# Patient Record
Sex: FEMALE | Race: BLACK OR AFRICAN AMERICAN | NOT HISPANIC OR LATINO | Employment: OTHER | URBAN - METROPOLITAN AREA
[De-identification: names, ages, dates, MRNs, and addresses within clinical notes are randomized per-mention and may not be internally consistent; named-entity substitution may affect disease eponyms.]

---

## 2018-03-03 ENCOUNTER — HOSPITAL ENCOUNTER (EMERGENCY)
Facility: HOSPITAL | Age: 53
Discharge: HOME | End: 2018-03-03
Attending: EMERGENCY MEDICINE
Payer: MEDICAID

## 2018-03-03 VITALS
BODY MASS INDEX: 30.53 KG/M2 | HEIGHT: 66 IN | WEIGHT: 190 LBS | DIASTOLIC BLOOD PRESSURE: 83 MMHG | OXYGEN SATURATION: 97 % | SYSTOLIC BLOOD PRESSURE: 166 MMHG | RESPIRATION RATE: 14 BRPM | TEMPERATURE: 98 F | HEART RATE: 104 BPM

## 2018-03-03 DIAGNOSIS — E11.9 DM2 (DIABETES MELLITUS, TYPE 2): ICD-10-CM

## 2018-03-03 DIAGNOSIS — K52.9 GASTROENTERITIS, ACUTE: Primary | ICD-10-CM

## 2018-03-03 DIAGNOSIS — I25.2 OLD MI (MYOCARDIAL INFARCTION): ICD-10-CM

## 2018-03-03 DIAGNOSIS — R07.9 CHEST PAIN: ICD-10-CM

## 2018-03-03 DIAGNOSIS — I10 HTN (HYPERTENSION): ICD-10-CM

## 2018-03-03 DIAGNOSIS — R73.9 HYPERGLYCEMIA: ICD-10-CM

## 2018-03-03 LAB
ALBUMIN SERPL BCP-MCNC: 2.7 G/DL
ALP SERPL-CCNC: 110 U/L
ALT SERPL W/O P-5'-P-CCNC: 7 U/L
ANION GAP SERPL CALC-SCNC: 13 MMOL/L
AST SERPL-CCNC: 7 U/L
B-OH-BUTYR BLD STRIP-SCNC: 0.6 MMOL/L
BASOPHILS # BLD AUTO: 0.04 K/UL
BASOPHILS NFR BLD: 0.6 %
BILIRUB SERPL-MCNC: 0.7 MG/DL
BNP SERPL-MCNC: <10 PG/ML
BUN SERPL-MCNC: 48 MG/DL
CALCIUM SERPL-MCNC: 8.8 MG/DL
CHLORIDE SERPL-SCNC: 93 MMOL/L
CO2 SERPL-SCNC: 19 MMOL/L
CREAT SERPL-MCNC: 1.8 MG/DL
DIFFERENTIAL METHOD: ABNORMAL
EOSINOPHIL # BLD AUTO: 0 K/UL
EOSINOPHIL NFR BLD: 0.1 %
ERYTHROCYTE [DISTWIDTH] IN BLOOD BY AUTOMATED COUNT: 12.2 %
EST. GFR  (AFRICAN AMERICAN): 37 ML/MIN/1.73 M^2
EST. GFR  (NON AFRICAN AMERICAN): 32.1 ML/MIN/1.73 M^2
GLUCOSE SERPL-MCNC: 660 MG/DL
HCT VFR BLD AUTO: 37.2 %
HGB BLD-MCNC: 13 G/DL
IMM GRANULOCYTES # BLD AUTO: 0.02 K/UL
IMM GRANULOCYTES NFR BLD AUTO: 0.3 %
LIPASE SERPL-CCNC: 31 U/L
LYMPHOCYTES # BLD AUTO: 1.5 K/UL
LYMPHOCYTES NFR BLD: 20.5 %
MAGNESIUM SERPL-MCNC: 2 MG/DL
MCH RBC QN AUTO: 31.2 PG
MCHC RBC AUTO-ENTMCNC: 34.9 G/DL
MCV RBC AUTO: 89 FL
MONOCYTES # BLD AUTO: 0.7 K/UL
MONOCYTES NFR BLD: 9 %
NEUTROPHILS # BLD AUTO: 5 K/UL
NEUTROPHILS NFR BLD: 69.5 %
NRBC BLD-RTO: 0 /100 WBC
PLATELET # BLD AUTO: 351 K/UL
PMV BLD AUTO: 11.3 FL
POCT GLUCOSE: 337 MG/DL (ref 70–110)
POCT GLUCOSE: 399 MG/DL (ref 70–110)
POTASSIUM SERPL-SCNC: 3.6 MMOL/L
PROT SERPL-MCNC: 7.5 G/DL
RBC # BLD AUTO: 4.17 M/UL
SODIUM SERPL-SCNC: 125 MMOL/L
TROPONIN I SERPL DL<=0.01 NG/ML-MCNC: 0.01 NG/ML
TROPONIN I SERPL DL<=0.01 NG/ML-MCNC: <0.006 NG/ML
WBC # BLD AUTO: 7.21 K/UL

## 2018-03-03 PROCEDURE — 93005 ELECTROCARDIOGRAM TRACING: CPT

## 2018-03-03 PROCEDURE — 80053 COMPREHEN METABOLIC PANEL: CPT

## 2018-03-03 PROCEDURE — 83690 ASSAY OF LIPASE: CPT

## 2018-03-03 PROCEDURE — 93010 ELECTROCARDIOGRAM REPORT: CPT | Mod: ,,, | Performed by: INTERNAL MEDICINE

## 2018-03-03 PROCEDURE — 85025 COMPLETE CBC W/AUTO DIFF WBC: CPT

## 2018-03-03 PROCEDURE — 82962 GLUCOSE BLOOD TEST: CPT

## 2018-03-03 PROCEDURE — 82010 KETONE BODYS QUAN: CPT

## 2018-03-03 PROCEDURE — 83735 ASSAY OF MAGNESIUM: CPT

## 2018-03-03 PROCEDURE — 99284 EMERGENCY DEPT VISIT MOD MDM: CPT | Mod: 25

## 2018-03-03 PROCEDURE — 83880 ASSAY OF NATRIURETIC PEPTIDE: CPT

## 2018-03-03 PROCEDURE — 63600175 PHARM REV CODE 636 W HCPCS: Performed by: EMERGENCY MEDICINE

## 2018-03-03 PROCEDURE — 96375 TX/PRO/DX INJ NEW DRUG ADDON: CPT

## 2018-03-03 PROCEDURE — 96374 THER/PROPH/DIAG INJ IV PUSH: CPT

## 2018-03-03 PROCEDURE — S0028 INJECTION, FAMOTIDINE, 20 MG: HCPCS | Performed by: EMERGENCY MEDICINE

## 2018-03-03 PROCEDURE — 25000003 PHARM REV CODE 250: Performed by: EMERGENCY MEDICINE

## 2018-03-03 PROCEDURE — 99284 EMERGENCY DEPT VISIT MOD MDM: CPT | Mod: ,,, | Performed by: EMERGENCY MEDICINE

## 2018-03-03 PROCEDURE — 84484 ASSAY OF TROPONIN QUANT: CPT | Mod: 91

## 2018-03-03 PROCEDURE — 96361 HYDRATE IV INFUSION ADD-ON: CPT

## 2018-03-03 RX ORDER — FAMOTIDINE 10 MG/ML
20 INJECTION INTRAVENOUS
Status: COMPLETED | OUTPATIENT
Start: 2018-03-03 | End: 2018-03-03

## 2018-03-03 RX ORDER — ASPIRIN 325 MG
325 TABLET ORAL
Status: DISCONTINUED | OUTPATIENT
Start: 2018-03-03 | End: 2018-03-03

## 2018-03-03 RX ORDER — ONDANSETRON 2 MG/ML
4 INJECTION INTRAMUSCULAR; INTRAVENOUS
Status: COMPLETED | OUTPATIENT
Start: 2018-03-03 | End: 2018-03-03

## 2018-03-03 RX ORDER — HYDROCODONE BITARTRATE AND ACETAMINOPHEN 5; 325 MG/1; MG/1
1 TABLET ORAL EVERY 6 HOURS PRN
Qty: 18 TABLET | Refills: 0 | Status: SHIPPED | OUTPATIENT
Start: 2018-03-03 | End: 2018-05-05

## 2018-03-03 RX ORDER — FAMOTIDINE 20 MG/1
20 TABLET, FILM COATED ORAL 2 TIMES DAILY
Qty: 30 TABLET | Refills: 0 | Status: ON HOLD | OUTPATIENT
Start: 2018-03-03 | End: 2018-05-07

## 2018-03-03 RX ORDER — ONDANSETRON 4 MG/1
4 TABLET, FILM COATED ORAL EVERY 6 HOURS
Qty: 12 TABLET | Refills: 0 | Status: SHIPPED | OUTPATIENT
Start: 2018-03-03 | End: 2018-05-05

## 2018-03-03 RX ADMIN — FAMOTIDINE 20 MG: 10 INJECTION, SOLUTION INTRAVENOUS at 11:03

## 2018-03-03 RX ADMIN — ONDANSETRON 4 MG: 2 INJECTION INTRAMUSCULAR; INTRAVENOUS at 11:03

## 2018-03-03 RX ADMIN — SODIUM CHLORIDE 1000 ML: 0.9 INJECTION, SOLUTION INTRAVENOUS at 01:03

## 2018-03-03 RX ADMIN — INSULIN HUMAN 10 UNITS: 100 INJECTION, SOLUTION PARENTERAL at 02:03

## 2018-03-03 RX ADMIN — SODIUM CHLORIDE 1000 ML: 0.9 INJECTION, SOLUTION INTRAVENOUS at 03:03

## 2018-03-03 NOTE — ED NOTES
Pt denies nausea after receiving Zofran IV. Pt still having epigastric tenderness and reports feeling like she has to belch.

## 2018-03-03 NOTE — ED NOTES
Left ac IV removed, catheter intact.  Dry dressing placed, secured with coban.  Reviewed D/C orders with patient and spouse.  Wheelchair to bedside. Patient was was wheeled to car.

## 2018-03-03 NOTE — ED PROVIDER NOTES
Encounter Date: 3/3/2018    SCRIBE #1 NOTE: I, Isaak Barton, am scribing for, and in the presence of,  Dr. Whitmore. I have scribed the following portions of the note - Other sections scribed: HPI, ROS, PE, MDM.       History     Chief Complaint   Patient presents with    Chest Pain     had mi 2010     Time seen by provider: 11:07 AM    This is a 52 y.o. female with co-morbidities including diabetes mellitus type 2, hyperlipidiemia, hyeprtension, history of previous MI with stenting, who presents to the Emergency Department with complaint of chest pain for the last 4 days, described as midsternal tightness that radiates to her epigastrium. She states that her pain is 10/10 in intensity, and worsens when she is walking. No radiation to her neck, back, or upper extremities. 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 Her diarrhea has since subsided, but she continues to have episodes of vomiting, as well as nausea, pain, generalized weakness. She also reports an episode of hematemesis. She denies any shoulder pain, jaw pain, shortness of breath, fever.   Patient additionally reports chronic abdominal pain for which she takes Nexium. She has never had endoscopy. She states that her last bowel movement was green in color with some mucus. She also mentions taking 1000 mg ibuprofen daily for the past 2 weeks for foot arthritis as well as a toothache. She denies history of anemia or history of difficulty tolerating fried foods. She takes lantus and metformin for her diabetes.       The history is provided by the patient and medical records.     Review of patient's allergies indicates:   Allergen Reactions    Pcn [penicillins]      rash     Past Medical History:   Diagnosis Date    Diabetes mellitus     type 2    GERD (gastroesophageal reflux disease)     Hyperlipemia      Hypertension     Myocardial infarction 2010    minor-caused by stress per pt.     Past Surgical History:   Procedure Laterality Date    Angiogram - Right Extremity Right 7/9/15    angiogram-left leg  10/6/15     Family History   Problem Relation Age of Onset    Anesthesia problems Neg Hx      Social History   Substance Use Topics    Smoking status: Never Smoker    Smokeless tobacco: Not on file    Alcohol use No     Review of Systems   Constitutional: Positive for chills. Negative for fever.   HENT:        Negative for jaw pain.    Respiratory: Negative for shortness of breath.    Cardiovascular: Positive for chest pain.   Gastrointestinal: Positive for abdominal pain, diarrhea, nausea and vomiting.   Genitourinary: Negative for difficulty urinating and dysuria.   Musculoskeletal: Negative for back pain, myalgias and neck pain.   Skin: Negative for color change.   Neurological: Positive for weakness (generalized).   Hematological: Does not bruise/bleed easily.       Physical Exam     Initial Vitals [03/03/18 1038]   BP Pulse Resp Temp SpO2   121/67 (!) 112 18 98.2 °F (36.8 °C) 97 %      MAP       85         Physical Exam    Nursing note and vitals reviewed.  Constitutional: She is cooperative.   Musculoskeletal: Normal range of motion. She exhibits no edema (no peripheral edema noted).   Neurological: She is alert and oriented to person, place, and time. No cranial nerve deficit.   Equal strength bilaterally.         ED Course   Procedures  Labs Reviewed   CBC W/ AUTO DIFFERENTIAL - Abnormal; Notable for the following:        Result Value    MCH 31.2 (*)     Platelets 351 (*)     All other components within normal limits   COMPREHENSIVE METABOLIC PANEL - Abnormal; Notable for the following:     Sodium 125 (*)     Chloride 93 (*)     CO2 19 (*)     Glucose 660 (*)     BUN, Bld 48 (*)     Creatinine 1.8 (*)     Albumin 2.7 (*)     AST 7 (*)     ALT 7 (*)     eGFR if  37.0 (*)     eGFR if non   32.1 (*)     All other components within normal limits   BETA - HYDROXYBUTYRATE, SERUM - Abnormal; Notable for the following:     Beta-Hydroxybutyrate 0.6 (*)     All other components within normal limits   POCT GLUCOSE - Abnormal; Notable for the following:     POCT Glucose 399 (*)     All other components within normal limits   POCT GLUCOSE - Abnormal; Notable for the following:     POCT Glucose 337 (*)     All other components within normal limits   B-TYPE NATRIURETIC PEPTIDE   TROPONIN I   MAGNESIUM   LIPASE   TROPONIN I     EKG Readings: (Independently Interpreted)   Heart rate 113. Sinus tachycardia. LVH. No STEMI.        X-Rays:   Independently Interpreted Readings:   Chest X-Ray: No cardiomegaly. Lungs clear. No pleural effusion. No consolidation.      Medical Decision Making:   History:   Old Medical Records: I decided to obtain old medical records.  Initial Assessment:   Patient states that she has a history of MI in the past with stents but presents today with 4 day history of epigastric discomfort associated with nausea, vomiting, diarrhea. The diarrhea is eased but she continues with dry heaves and epigastric discomfort. She did miss 2 days of work because of this. She has no sick contacts. Will do abdominal workup and also rule out cardiac associated causes of her pain    1330. Notified by lab with a blood glucose of 600+. We did a beta-hydroxybutyrate which was 0.6. We gave the patient some IV insulin and will give the patient 2L of fluid. Troponins have both returned and appear to be normal.     1635. Patient after 2L of fluid and 10 of regular insulin now has a blood glucose of 337. Patient's vital signs appear stable. She is will discharged home. She is to increase her PO intake. Will give her antiemetics and a few days of pain medication. She will attempt to get back on her insulin. The patient is very capable of caring for herself and feel comfortable with letting her go.    Independently Interpreted Test(s):   I have ordered and independently interpreted EKG Reading(s) - see prior notes  Clinical Tests:   Lab Tests: Ordered and Reviewed  Radiological Study: Ordered and Reviewed  Medical Tests: Ordered and Reviewed            Scribe Attestation:   Scribe #1: I performed the above scribed service and the documentation accurately describes the services I performed. I attest to the accuracy of the note.    Attending Attestation:             Attending ED Notes:   Patient's discomfort felt to me more  Gastroenteritis and not cardiac of concern is the fact that her sug but this is because she was not using her insulin when  Patient reassured much improved wi and insulin and the patient is discharged to get back to her diet and to advance             Clinical Impression:   The primary encounter diagnosis was Gastroenteritis, acute. Diagnoses of Chest pain and Hyperglycemia were also pertinent to this visit.    Disposition:   Disposition: Discharged  Condition: Stable                        Eliot Whitmore MD  03/07/18 3000

## 2018-03-03 NOTE — ED NOTES
The patient is awake, alert and acting age appropriately. Family at bedside.    No apparent distress noted. Pt c/o mild epigastric pain.  Airway is open and patent.  Respirations with normal effort and rate noted. Explanation of care provided to family and patient. No needs at this time. Will continue to monitor.

## 2018-03-03 NOTE — ED NOTES
LOC: The patient is awake, alert and aware of environment with an appropriate affect, the patient is oriented x 3 and speaking appropriately.  APPEARANCE: Patient resting comfortably and in no acute distress, patient is clean and well groomed, patient's clothing is properly fastened.  SKIN: The skin is warm and dry, patient has normal skin turgor and moist mucus membranes, skin intact, no breakdown or brusing noted.  MUSKULOSKELETAL: Patient moving all extremities well, no obvious swelling or deformities noted.  RESPIRATORY: Airway is open and patent, respirations are spontaneous, patient has a normal effort and rate. Breath sounds are clear and equal bilaterally.  CARDIAC: Normal heart sounds. No peripheral edema.  ABDOMEN: Soft and tender to palpation in the epigastric area, no distention noted. Bowel sounds present.  NEURO: No neuro deficits, hand grasp equal, no drift noted, no facial droop noted. Speech is clear.

## 2018-03-03 NOTE — ED TRIAGE NOTES
Pt presents with substernal chest pain described as a squeezing sensation. Pt is non-radiating and she has some relief when belching. Pt states she started having nausea, vomiting and diarrhea on Wed.

## 2018-05-05 ENCOUNTER — HOSPITAL ENCOUNTER (OUTPATIENT)
Facility: HOSPITAL | Age: 53
Discharge: HOME | DRG: 880 | End: 2018-05-07
Attending: EMERGENCY MEDICINE | Admitting: HOSPITALIST
Payer: MEDICAID

## 2018-05-05 DIAGNOSIS — R73.9 HYPERGLYCEMIA: ICD-10-CM

## 2018-05-05 DIAGNOSIS — E86.0 DEHYDRATION: ICD-10-CM

## 2018-05-05 DIAGNOSIS — E87.5 HYPERKALEMIA: ICD-10-CM

## 2018-05-05 DIAGNOSIS — E11.9 TYPE 2 DIABETES MELLITUS WITHOUT COMPLICATION, WITH LONG-TERM CURRENT USE OF INSULIN: ICD-10-CM

## 2018-05-05 DIAGNOSIS — F43.0 PANIC ATTACK AS REACTION TO STRESS: ICD-10-CM

## 2018-05-05 DIAGNOSIS — E87.6 HYPOKALEMIA: Primary | ICD-10-CM

## 2018-05-05 DIAGNOSIS — F41.0 PANIC ATTACK AS REACTION TO STRESS: ICD-10-CM

## 2018-05-05 DIAGNOSIS — G93.40 ACUTE ENCEPHALOPATHY: ICD-10-CM

## 2018-05-05 DIAGNOSIS — Z79.4 TYPE 2 DIABETES MELLITUS WITHOUT COMPLICATION, WITH LONG-TERM CURRENT USE OF INSULIN: ICD-10-CM

## 2018-05-05 DIAGNOSIS — F41.9 ANXIETY: ICD-10-CM

## 2018-05-05 PROBLEM — I10 ESSENTIAL HYPERTENSION: Status: ACTIVE | Noted: 2018-05-05

## 2018-05-05 PROBLEM — E83.51 HYPOCALCEMIA: Status: ACTIVE | Noted: 2018-05-05

## 2018-05-05 PROBLEM — E44.0 MALNUTRITION OF MODERATE DEGREE: Status: ACTIVE | Noted: 2018-05-05

## 2018-05-05 LAB
25(OH)D3+25(OH)D2 SERPL-MCNC: 4 NG/ML
ALBUMIN SERPL BCP-MCNC: 2.1 G/DL
ALBUMIN SERPL BCP-MCNC: 2.6 G/DL
ALP SERPL-CCNC: 115 U/L
ALP SERPL-CCNC: 99 U/L
ALT SERPL W/O P-5'-P-CCNC: 5 U/L
ALT SERPL W/O P-5'-P-CCNC: 7 U/L
AMPHET+METHAMPHET UR QL: NEGATIVE
ANION GAP SERPL CALC-SCNC: 11 MMOL/L
ANION GAP SERPL CALC-SCNC: 9 MMOL/L
AST SERPL-CCNC: 7 U/L
AST SERPL-CCNC: 8 U/L
B-OH-BUTYR BLD STRIP-SCNC: 0 MMOL/L
BACTERIA #/AREA URNS AUTO: NORMAL /HPF
BARBITURATES UR QL SCN>200 NG/ML: NEGATIVE
BASOPHILS # BLD AUTO: 0.04 K/UL
BASOPHILS NFR BLD: 0.4 %
BENZODIAZ UR QL SCN>200 NG/ML: NEGATIVE
BILIRUB SERPL-MCNC: 0.2 MG/DL
BILIRUB SERPL-MCNC: 0.4 MG/DL
BILIRUB UR QL STRIP: NEGATIVE
BUN SERPL-MCNC: 10 MG/DL
BUN SERPL-MCNC: 10 MG/DL
BZE UR QL SCN: NEGATIVE
CA-I BLDV-SCNC: 1.18 MMOL/L
CALCIUM SERPL-MCNC: 6.7 MG/DL
CALCIUM SERPL-MCNC: 8.8 MG/DL
CANNABINOIDS UR QL SCN: NORMAL
CHLORIDE SERPL-SCNC: 102 MMOL/L
CHLORIDE SERPL-SCNC: 112 MMOL/L
CK SERPL-CCNC: 75 U/L
CLARITY UR REFRACT.AUTO: CLEAR
CO2 SERPL-SCNC: 16 MMOL/L
CO2 SERPL-SCNC: 24 MMOL/L
COLOR UR AUTO: ABNORMAL
CREAT SERPL-MCNC: 0.7 MG/DL
CREAT SERPL-MCNC: 0.9 MG/DL
CREAT UR-MCNC: 25 MG/DL
DIFFERENTIAL METHOD: ABNORMAL
EOSINOPHIL # BLD AUTO: 0.1 K/UL
EOSINOPHIL NFR BLD: 1.2 %
ERYTHROCYTE [DISTWIDTH] IN BLOOD BY AUTOMATED COUNT: 13 %
EST. GFR  (AFRICAN AMERICAN): >60 ML/MIN/1.73 M^2
EST. GFR  (AFRICAN AMERICAN): >60 ML/MIN/1.73 M^2
EST. GFR  (NON AFRICAN AMERICAN): >60 ML/MIN/1.73 M^2
EST. GFR  (NON AFRICAN AMERICAN): >60 ML/MIN/1.73 M^2
ESTIMATED AVG GLUCOSE: ABNORMAL MG/DL
GLUCOSE SERPL-MCNC: 335 MG/DL
GLUCOSE SERPL-MCNC: 392 MG/DL
GLUCOSE UR QL STRIP: ABNORMAL
HBA1C MFR BLD HPLC: >14 %
HCT VFR BLD AUTO: 37.6 %
HGB BLD-MCNC: 12.5 G/DL
HGB UR QL STRIP: ABNORMAL
HYALINE CASTS UR QL AUTO: 0 /LPF
IMM GRANULOCYTES # BLD AUTO: 0.04 K/UL
IMM GRANULOCYTES NFR BLD AUTO: 0.4 %
INR PPP: 0.9
KETONES UR QL STRIP: NEGATIVE
LACTATE SERPL-SCNC: 2.7 MMOL/L
LEUKOCYTE ESTERASE UR QL STRIP: NEGATIVE
LYMPHOCYTES # BLD AUTO: 2.3 K/UL
LYMPHOCYTES NFR BLD: 25 %
MAGNESIUM SERPL-MCNC: 1.5 MG/DL
MCH RBC QN AUTO: 31.3 PG
MCHC RBC AUTO-ENTMCNC: 33.2 G/DL
MCV RBC AUTO: 94 FL
METHADONE UR QL SCN>300 NG/ML: NEGATIVE
MICROSCOPIC COMMENT: NORMAL
MONOCYTES # BLD AUTO: 0.7 K/UL
MONOCYTES NFR BLD: 7.2 %
NEUTROPHILS # BLD AUTO: 6.2 K/UL
NEUTROPHILS NFR BLD: 65.8 %
NITRITE UR QL STRIP: NEGATIVE
NRBC BLD-RTO: 0 /100 WBC
OPIATES UR QL SCN: NEGATIVE
PCP UR QL SCN>25 NG/ML: NEGATIVE
PH UR STRIP: 7 [PH] (ref 5–8)
PHOSPHATE SERPL-MCNC: 2 MG/DL
PLATELET # BLD AUTO: 308 K/UL
PMV BLD AUTO: 11.6 FL
POCT GLUCOSE: 356 MG/DL (ref 70–110)
POTASSIUM SERPL-SCNC: 2.8 MMOL/L
POTASSIUM SERPL-SCNC: 3.6 MMOL/L
PROCALCITONIN SERPL IA-MCNC: 0.06 NG/ML
PROT SERPL-MCNC: 5.6 G/DL
PROT SERPL-MCNC: 7 G/DL
PROT UR QL STRIP: ABNORMAL
PROTHROMBIN TIME: 9.4 SEC
RBC # BLD AUTO: 3.99 M/UL
RBC #/AREA URNS AUTO: 2 /HPF (ref 0–4)
SODIUM SERPL-SCNC: 135 MMOL/L
SODIUM SERPL-SCNC: 139 MMOL/L
SP GR UR STRIP: 1.02 (ref 1–1.03)
SQUAMOUS #/AREA URNS AUTO: 1 /HPF
TOXICOLOGY INFORMATION: NORMAL
TROPONIN I SERPL DL<=0.01 NG/ML-MCNC: 0.02 NG/ML
URN SPEC COLLECT METH UR: ABNORMAL
UROBILINOGEN UR STRIP-ACNC: NEGATIVE EU/DL
WBC # BLD AUTO: 9.35 K/UL
WBC #/AREA URNS AUTO: 0 /HPF (ref 0–5)
YEAST UR QL AUTO: NORMAL

## 2018-05-05 PROCEDURE — 85025 COMPLETE CBC W/AUTO DIFF WBC: CPT

## 2018-05-05 PROCEDURE — 85610 PROTHROMBIN TIME: CPT

## 2018-05-05 PROCEDURE — 25000003 PHARM REV CODE 250: Performed by: HOSPITALIST

## 2018-05-05 PROCEDURE — 81001 URINALYSIS AUTO W/SCOPE: CPT | Mod: 59

## 2018-05-05 PROCEDURE — 93010 ELECTROCARDIOGRAM REPORT: CPT | Mod: 76,,, | Performed by: INTERNAL MEDICINE

## 2018-05-05 PROCEDURE — 25000003 PHARM REV CODE 250: Performed by: EMERGENCY MEDICINE

## 2018-05-05 PROCEDURE — 83605 ASSAY OF LACTIC ACID: CPT

## 2018-05-05 PROCEDURE — G0378 HOSPITAL OBSERVATION PER HR: HCPCS

## 2018-05-05 PROCEDURE — 63600175 PHARM REV CODE 636 W HCPCS: Performed by: EMERGENCY MEDICINE

## 2018-05-05 PROCEDURE — 99222 1ST HOSP IP/OBS MODERATE 55: CPT | Mod: ,,, | Performed by: HOSPITALIST

## 2018-05-05 PROCEDURE — 83880 ASSAY OF NATRIURETIC PEPTIDE: CPT

## 2018-05-05 PROCEDURE — 80053 COMPREHEN METABOLIC PANEL: CPT | Mod: 91

## 2018-05-05 PROCEDURE — 82330 ASSAY OF CALCIUM: CPT

## 2018-05-05 PROCEDURE — 84484 ASSAY OF TROPONIN QUANT: CPT

## 2018-05-05 PROCEDURE — 80307 DRUG TEST PRSMV CHEM ANLYZR: CPT

## 2018-05-05 PROCEDURE — 82306 VITAMIN D 25 HYDROXY: CPT

## 2018-05-05 PROCEDURE — 80053 COMPREHEN METABOLIC PANEL: CPT

## 2018-05-05 PROCEDURE — 82550 ASSAY OF CK (CPK): CPT

## 2018-05-05 PROCEDURE — 83735 ASSAY OF MAGNESIUM: CPT

## 2018-05-05 PROCEDURE — 93010 ELECTROCARDIOGRAM REPORT: CPT | Mod: ,,, | Performed by: INTERNAL MEDICINE

## 2018-05-05 PROCEDURE — 11000001 HC ACUTE MED/SURG PRIVATE ROOM

## 2018-05-05 PROCEDURE — 83036 HEMOGLOBIN GLYCOSYLATED A1C: CPT

## 2018-05-05 PROCEDURE — 84145 PROCALCITONIN (PCT): CPT

## 2018-05-05 PROCEDURE — 63600175 PHARM REV CODE 636 W HCPCS: Performed by: HOSPITALIST

## 2018-05-05 PROCEDURE — 84100 ASSAY OF PHOSPHORUS: CPT

## 2018-05-05 PROCEDURE — 36415 COLL VENOUS BLD VENIPUNCTURE: CPT

## 2018-05-05 PROCEDURE — 82010 KETONE BODYS QUAN: CPT

## 2018-05-05 RX ORDER — LISINOPRIL 10 MG/1
10 TABLET ORAL
Status: COMPLETED | OUTPATIENT
Start: 2018-05-05 | End: 2018-05-05

## 2018-05-05 RX ORDER — LISINOPRIL 20 MG/1
20 TABLET ORAL DAILY
Status: DISCONTINUED | OUTPATIENT
Start: 2018-05-06 | End: 2018-05-06

## 2018-05-05 RX ORDER — MAGNESIUM SULFATE HEPTAHYDRATE 40 MG/ML
2 INJECTION, SOLUTION INTRAVENOUS
Status: COMPLETED | OUTPATIENT
Start: 2018-05-05 | End: 2018-05-05

## 2018-05-05 RX ORDER — FAMOTIDINE 20 MG/1
20 TABLET, FILM COATED ORAL 2 TIMES DAILY
Status: DISCONTINUED | OUTPATIENT
Start: 2018-05-05 | End: 2018-05-07 | Stop reason: HOSPADM

## 2018-05-05 RX ORDER — SODIUM CHLORIDE AND POTASSIUM CHLORIDE 150; 900 MG/100ML; MG/100ML
INJECTION, SOLUTION INTRAVENOUS CONTINUOUS
Status: DISCONTINUED | OUTPATIENT
Start: 2018-05-05 | End: 2018-05-06

## 2018-05-05 RX ORDER — INSULIN ASPART 100 [IU]/ML
6 INJECTION, SOLUTION INTRAVENOUS; SUBCUTANEOUS
Status: DISCONTINUED | OUTPATIENT
Start: 2018-05-05 | End: 2018-05-07

## 2018-05-05 RX ORDER — IBUPROFEN 200 MG
24 TABLET ORAL
Status: DISCONTINUED | OUTPATIENT
Start: 2018-05-05 | End: 2018-05-07 | Stop reason: HOSPADM

## 2018-05-05 RX ORDER — SODIUM CHLORIDE AND POTASSIUM CHLORIDE 150; 900 MG/100ML; MG/100ML
INJECTION, SOLUTION INTRAVENOUS CONTINUOUS
Status: DISCONTINUED | OUTPATIENT
Start: 2018-05-05 | End: 2018-05-05

## 2018-05-05 RX ORDER — IBUPROFEN 200 MG
16 TABLET ORAL
Status: DISCONTINUED | OUTPATIENT
Start: 2018-05-05 | End: 2018-05-07 | Stop reason: HOSPADM

## 2018-05-05 RX ORDER — SODIUM,POTASSIUM PHOSPHATES 280-250MG
2 POWDER IN PACKET (EA) ORAL 2 TIMES DAILY
Status: COMPLETED | OUTPATIENT
Start: 2018-05-05 | End: 2018-05-06

## 2018-05-05 RX ORDER — GABAPENTIN 100 MG/1
300 CAPSULE ORAL 3 TIMES DAILY
Status: DISCONTINUED | OUTPATIENT
Start: 2018-05-05 | End: 2018-05-07 | Stop reason: HOSPADM

## 2018-05-05 RX ORDER — SODIUM CHLORIDE 0.9 % (FLUSH) 0.9 %
5 SYRINGE (ML) INJECTION
Status: DISCONTINUED | OUTPATIENT
Start: 2018-05-05 | End: 2018-05-07 | Stop reason: HOSPADM

## 2018-05-05 RX ORDER — ONDANSETRON 8 MG/1
8 TABLET, ORALLY DISINTEGRATING ORAL EVERY 8 HOURS PRN
Status: DISCONTINUED | OUTPATIENT
Start: 2018-05-05 | End: 2018-05-07 | Stop reason: HOSPADM

## 2018-05-05 RX ORDER — ASPIRIN 325 MG
325 TABLET ORAL DAILY
Status: DISCONTINUED | OUTPATIENT
Start: 2018-05-05 | End: 2018-05-07 | Stop reason: HOSPADM

## 2018-05-05 RX ORDER — GLUCAGON 1 MG
1 KIT INJECTION
Status: DISCONTINUED | OUTPATIENT
Start: 2018-05-05 | End: 2018-05-07 | Stop reason: HOSPADM

## 2018-05-05 RX ORDER — ERGOCALCIFEROL 1.25 MG/1
50000 CAPSULE ORAL
Status: DISCONTINUED | OUTPATIENT
Start: 2018-05-06 | End: 2018-05-07 | Stop reason: HOSPADM

## 2018-05-05 RX ORDER — LANOLIN ALCOHOL/MO/W.PET/CERES
400 CREAM (GRAM) TOPICAL ONCE
Status: COMPLETED | OUTPATIENT
Start: 2018-05-05 | End: 2018-05-05

## 2018-05-05 RX ORDER — PROMETHAZINE HYDROCHLORIDE 25 MG/1
25 TABLET ORAL EVERY 6 HOURS PRN
Status: DISCONTINUED | OUTPATIENT
Start: 2018-05-05 | End: 2018-05-07 | Stop reason: HOSPADM

## 2018-05-05 RX ORDER — INSULIN ASPART 100 [IU]/ML
0-5 INJECTION, SOLUTION INTRAVENOUS; SUBCUTANEOUS
Status: DISCONTINUED | OUTPATIENT
Start: 2018-05-05 | End: 2018-05-07 | Stop reason: HOSPADM

## 2018-05-05 RX ORDER — HYDRALAZINE HYDROCHLORIDE 50 MG/1
50 TABLET, FILM COATED ORAL EVERY 8 HOURS PRN
Status: DISCONTINUED | OUTPATIENT
Start: 2018-05-05 | End: 2018-05-07 | Stop reason: HOSPADM

## 2018-05-05 RX ORDER — ATORVASTATIN CALCIUM 20 MG/1
40 TABLET, FILM COATED ORAL DAILY
Status: DISCONTINUED | OUTPATIENT
Start: 2018-05-06 | End: 2018-05-07 | Stop reason: HOSPADM

## 2018-05-05 RX ORDER — POLYETHYLENE GLYCOL 3350 17 G/17G
17 POWDER, FOR SOLUTION ORAL 2 TIMES DAILY PRN
Status: DISCONTINUED | OUTPATIENT
Start: 2018-05-05 | End: 2018-05-07 | Stop reason: HOSPADM

## 2018-05-05 RX ORDER — METFORMIN HYDROCHLORIDE 500 MG/1
500 TABLET ORAL 2 TIMES DAILY WITH MEALS
Status: ON HOLD | COMMUNITY
End: 2018-05-07 | Stop reason: HOSPADM

## 2018-05-05 RX ORDER — RAMELTEON 8 MG/1
8 TABLET ORAL NIGHTLY PRN
Status: DISCONTINUED | OUTPATIENT
Start: 2018-05-05 | End: 2018-05-07 | Stop reason: HOSPADM

## 2018-05-05 RX ORDER — INSULIN GLARGINE 100 [IU]/ML
INJECTION, SOLUTION SUBCUTANEOUS NIGHTLY
Status: ON HOLD | COMMUNITY
End: 2018-05-07

## 2018-05-05 RX ORDER — CLOPIDOGREL BISULFATE 75 MG/1
75 TABLET ORAL ONCE
Status: COMPLETED | OUTPATIENT
Start: 2018-05-05 | End: 2018-05-05

## 2018-05-05 RX ORDER — LORAZEPAM 2 MG/ML
2 INJECTION INTRAMUSCULAR EVERY 4 HOURS PRN
Status: DISCONTINUED | OUTPATIENT
Start: 2018-05-05 | End: 2018-05-06

## 2018-05-05 RX ORDER — ACETAMINOPHEN 325 MG/1
650 TABLET ORAL EVERY 8 HOURS PRN
Status: DISCONTINUED | OUTPATIENT
Start: 2018-05-05 | End: 2018-05-07 | Stop reason: HOSPADM

## 2018-05-05 RX ORDER — ENOXAPARIN SODIUM 100 MG/ML
40 INJECTION SUBCUTANEOUS EVERY 24 HOURS
Status: DISCONTINUED | OUTPATIENT
Start: 2018-05-05 | End: 2018-05-07 | Stop reason: HOSPADM

## 2018-05-05 RX ORDER — FERROUS SULFATE, DRIED 160(50) MG
1 TABLET, EXTENDED RELEASE ORAL ONCE
Status: COMPLETED | OUTPATIENT
Start: 2018-05-05 | End: 2018-05-05

## 2018-05-05 RX ADMIN — SODIUM CHLORIDE, SODIUM LACTATE, POTASSIUM CHLORIDE, AND CALCIUM CHLORIDE 1000 ML: .6; .31; .03; .02 INJECTION, SOLUTION INTRAVENOUS at 02:05

## 2018-05-05 RX ADMIN — MAGNESIUM OXIDE TAB 400 MG (241.3 MG ELEMENTAL MG) 400 MG: 400 (241.3 MG) TAB at 02:05

## 2018-05-05 RX ADMIN — OYSTER SHELL CALCIUM WITH VITAMIN D 1 TABLET: 500; 200 TABLET, FILM COATED ORAL at 09:05

## 2018-05-05 RX ADMIN — SODIUM CHLORIDE, SODIUM LACTATE, POTASSIUM CHLORIDE, AND CALCIUM CHLORIDE 1000 ML: .6; .31; .03; .02 INJECTION, SOLUTION INTRAVENOUS at 11:05

## 2018-05-05 RX ADMIN — INSULIN ASPART 6 UNITS: 100 INJECTION, SOLUTION INTRAVENOUS; SUBCUTANEOUS at 10:05

## 2018-05-05 RX ADMIN — INSULIN DETEMIR 50 UNITS: 100 INJECTION, SOLUTION SUBCUTANEOUS at 09:05

## 2018-05-05 RX ADMIN — SODIUM CHLORIDE AND POTASSIUM CHLORIDE: 9; 1.49 INJECTION, SOLUTION INTRAVENOUS at 02:05

## 2018-05-05 RX ADMIN — CLOPIDOGREL BISULFATE 75 MG: 75 TABLET ORAL at 05:05

## 2018-05-05 RX ADMIN — DIBASIC SODIUM PHOSPHATE, MONOBASIC POTASSIUM PHOSPHATE AND MONOBASIC SODIUM PHOSPHATE 2 PACKET: 852; 155; 130 TABLET ORAL at 10:05

## 2018-05-05 RX ADMIN — FAMOTIDINE 20 MG: 20 TABLET, FILM COATED ORAL at 09:05

## 2018-05-05 RX ADMIN — LISINOPRIL 10 MG: 10 TABLET ORAL at 01:05

## 2018-05-05 RX ADMIN — GABAPENTIN 300 MG: 100 CAPSULE ORAL at 09:05

## 2018-05-05 RX ADMIN — INSULIN ASPART 6 UNITS: 100 INJECTION, SOLUTION INTRAVENOUS; SUBCUTANEOUS at 06:05

## 2018-05-05 RX ADMIN — ENOXAPARIN SODIUM 40 MG: 100 INJECTION SUBCUTANEOUS at 09:05

## 2018-05-05 RX ADMIN — INSULIN ASPART 3 UNITS: 100 INJECTION, SOLUTION INTRAVENOUS; SUBCUTANEOUS at 10:05

## 2018-05-05 RX ADMIN — POTASSIUM BICARBONATE 50 MEQ: 25 TABLET, EFFERVESCENT ORAL at 02:05

## 2018-05-05 RX ADMIN — MAGNESIUM SULFATE IN WATER 2 G: 40 INJECTION, SOLUTION INTRAVENOUS at 05:05

## 2018-05-05 RX ADMIN — ASPIRIN 325 MG ORAL TABLET 325 MG: 325 PILL ORAL at 05:05

## 2018-05-05 NOTE — ED NOTES
Erica BRIGHT given update on pts. Status and the need to have lab collect blood by phlebotomy. Report given.

## 2018-05-05 NOTE — SUBJECTIVE & OBJECTIVE
Past Medical History:   Diagnosis Date    Diabetes mellitus     type 2    GERD (gastroesophageal reflux disease)     Hyperlipemia     Hypertension     Myocardial infarction 2010    minor-caused by stress per pt.       Past Surgical History:   Procedure Laterality Date    Angiogram - Right Extremity Right 7/9/15    angiogram-left leg  10/6/15       Review of patient's allergies indicates:   Allergen Reactions    Pcn [penicillins]      rash       No current facility-administered medications on file prior to encounter.      Current Outpatient Prescriptions on File Prior to Encounter   Medication Sig    aspirin 325 MG tablet Take 1 tablet (325 mg total) by mouth once daily.    lisinopril 10 MG tablet Take 20 mg by mouth once daily.     [DISCONTINUED] pravastatin (PRAVACHOL) 20 MG tablet     clopidogrel (PLAVIX) 75 mg tablet     famotidine (PEPCID) 20 MG tablet Take 1 tablet (20 mg total) by mouth 2 (two) times daily.    gabapentin (NEURONTIN) 300 MG capsule     LEVEMIR 100 unit/mL injection Inject 50 Units into the skin every evening.     [DISCONTINUED] clindamycin (CLEOCIN) 150 MG capsule     [DISCONTINUED] docusate sodium (COLACE) 100 MG capsule Take 1 capsule (100 mg total) by mouth 2 (two) times daily.    [DISCONTINUED] esomeprazole (NEXIUM) 40 MG capsule Take 40 mg by mouth before breakfast.    [DISCONTINUED] hydrocodone-acetaminophen 5-325mg (NORCO) 5-325 mg per tablet Take 1 tablet by mouth every 6 (six) hours as needed for Pain.    [DISCONTINUED] ondansetron (ZOFRAN) 4 MG tablet Take 1 tablet (4 mg total) by mouth every 6 (six) hours.    [DISCONTINUED] tramadol (ULTRAM) 50 mg tablet Take 1 tablet (50 mg total) by mouth every 8 (eight) hours as needed for Pain.     Family History     None        Social History Main Topics    Smoking status: Never Smoker    Smokeless tobacco: Never Used    Alcohol use No    Drug use: Yes     Types: Marijuana      Comment: used yesterday    Sexual  activity: Not on file     Review of Systems   Constitutional: Positive for appetite change and chills. Negative for fever.   HENT: Negative for sore throat.    Respiratory: Positive for cough (dry). Negative for shortness of breath.    Cardiovascular: Positive for chest pain, palpitations and leg swelling (ankles).   Gastrointestinal: Positive for nausea (chronic). Negative for abdominal pain, constipation, diarrhea and vomiting.   Endocrine: Positive for polydipsia.   Genitourinary: Negative for dysuria and frequency.   Musculoskeletal: Negative for back pain.        Positive for chronic cramping in her calves.   Skin: Negative for rash.   Neurological: Negative for weakness.        Positive for difficulty balancing. Negative for numbness in her arms. Positive for chronic numbness in her legs   Hematological: Does not bruise/bleed easily.   Psychiatric/Behavioral: Positive for decreased concentration. The patient is nervous/anxious.          Objective:     Vital Signs (Most Recent):  Temp: 98.6 °F (37 °C) (05/05/18 1530)  Pulse: 107 (05/05/18 1530)  Resp: 12 (05/05/18 1530)  BP: (!) 162/82 (05/05/18 1530)  SpO2: 99 % (05/05/18 1530) Vital Signs (24h Range):  Temp:  [98.3 °F (36.8 °C)-98.7 °F (37.1 °C)] 98.6 °F (37 °C)  Pulse:  [102-120] 107  Resp:  [12-20] 12  SpO2:  [95 %-99 %] 99 %  BP: (162-202)/() 162/82     Weight: 130.2 kg (287 lb)  Body mass index is 47.03 kg/m².    Physical Exam    Nursing note and vitals reviewed.  Constitutional: She appears well-developed and well-nourished. No distress.   HENT:   Head: Atraumatic.   Mouth/Throat: No oropharyngeal exudate.   Dry mucous membranes.    Eyes: EOM are normal. Pupils are equal, round, and reactive to light.   Neck: Neck supple. No JVD present.   Cardiovascular: Regular rhythm and normal heart sounds. Tachycardia present.  Exam reveals no gallop and no friction rub.    No murmur heard.  Pulmonary/Chest: Breath sounds normal. No respiratory distress. She  has no wheezes. She has no rales.   Abdominal: Soft. Bowel sounds are normal. She exhibits no distension. There is no tenderness.   Musculoskeletal: She exhibits chronic LE edema (bilat) or tenderness.   Neurological: She is alert and oriented to person, place, and time. She has normal strength. No cranial nerve deficit or sensory deficit.   Skin: Skin is warm and dry. No rash noted. No pallor.   Psychiatric: She has a normal mood and affect.      Significant Labs:   A1C:   Recent Labs  Lab 05/05/18  1134   HGBA1C >14.0*     CBC:   Recent Labs  Lab 05/05/18  1134   WBC 9.35   HGB 12.5   HCT 37.6        CMP:   Recent Labs  Lab 05/05/18  1134      K 2.8*   *   CO2 16*   *   BUN 10   CREATININE 0.7   CALCIUM 6.7*   PROT 5.6*   ALBUMIN 2.1*   BILITOT 0.2   ALKPHOS 99   AST 8*   ALT 7*   ANIONGAP 11   EGFRNONAA >60.0     POCT Glucose: No results for input(s): POCTGLUCOSE in the last 48 hours.    Significant Imaging: I have reviewed all pertinent imaging results/findings within the past 24 hours.

## 2018-05-05 NOTE — ED PROVIDER NOTES
"Encounter Date: 5/5/2018    SCRIBE #1 NOTE: I, Leila Sanchez, am scribing for, and in the presence of,  Dr. Connell. I have scribed the entire note.       History     Chief Complaint   Patient presents with    Chest Pain     nauseated and SOB. Intermittent CP x 3 weeks, Feels anxious. BP elevated, Called daughter to pick her up from work b/c she  felt like she was having a nervous breakdown. Denies psych hx.     Anxiety     Time patient was seen by the provider: 11:00 AM      The patient is a 52 y.o. female with co-morbidities including: T2DM, HTN, HLD, GERD, and history of MI, who presents to the ED with a complaint of chest pain with associated anxiety, palpitations, and "chills going down both of her arms". She feels "like something bad is about to happen and she does not have any control over it", and feels "like she is standing outside of her body and can see herself looking around". She endorses some chest pain over the past few days, a dry cough, swelling in her ankles, chills, polydipsia, recent stress (2 deaths in the family and starting a new job), difficulty focusing, difficulty balancing, loss of appetite (food tastes like cardboard) chronic nausea, and chronic numbness in her legs, chronic cramps in her calves (when she walks and at night while sleeping). She denies SOB, numbness in her arms, vomiting, fevers, abdominal pain, dysuria, frequent urination, abnormal bowel movements, and back pain.    She had an NSTEMI in 2010, and today's complaints feel different than her symptoms at that time. She has stents in her legs but no stents in her heart. She has been taking all of her medications. For DM, she has been taking 50 mg Lantus and 500 mg Metformin. On a "bad day" her blood sugar can be around 250.          Review of patient's allergies indicates:   Allergen Reactions    Pcn [penicillins]      rash     Past Medical History:   Diagnosis Date    Diabetes mellitus     type 2    GERD " (gastroesophageal reflux disease)     Hyperlipemia     Hypertension     Myocardial infarction 2010    minor-caused by stress per pt.     Past Surgical History:   Procedure Laterality Date    Angiogram - Right Extremity Right 7/9/15    angiogram-left leg  10/6/15     Family History   Problem Relation Age of Onset    Anesthesia problems Neg Hx      Social History   Substance Use Topics    Smoking status: Never Smoker    Smokeless tobacco: Never Used    Alcohol use No     Review of Systems   Constitutional: Positive for appetite change and chills. Negative for fever.   HENT: Negative for sore throat.    Respiratory: Positive for cough (dry). Negative for shortness of breath.    Cardiovascular: Positive for chest pain, palpitations and leg swelling (ankles).   Gastrointestinal: Positive for nausea (chronic). Negative for abdominal pain, constipation, diarrhea and vomiting.   Endocrine: Positive for polydipsia.   Genitourinary: Negative for dysuria and frequency.   Musculoskeletal: Negative for back pain.        Positive for chronic cramping in her calves.   Skin: Negative for rash.   Neurological: Negative for weakness.        Positive for difficulty balancing. Negative for numbness in her arms. Positive for chronic numbness in her legs   Hematological: Does not bruise/bleed easily.   Psychiatric/Behavioral: Positive for decreased concentration. The patient is nervous/anxious.        Physical Exam     Initial Vitals [05/05/18 1042]   BP Pulse Resp Temp SpO2   (!) 202/98 (!) 120 20 98.7 °F (37.1 °C) 95 %      MAP       132.67         Physical Exam    Nursing note and vitals reviewed.  Constitutional: She appears well-developed and well-nourished. No distress.   HENT:   Head: Atraumatic.   Mouth/Throat: No oropharyngeal exudate.   Dry mucous membranes.    Eyes: EOM are normal. Pupils are equal, round, and reactive to light.   Neck: Neck supple. No JVD present.   Cardiovascular: Regular rhythm and normal heart  sounds. Tachycardia present.  Exam reveals no gallop and no friction rub.    No murmur heard.  Pulmonary/Chest: Breath sounds normal. No respiratory distress. She has no wheezes. She has no rales.   Abdominal: Soft. Bowel sounds are normal. She exhibits no distension. There is no tenderness.   Musculoskeletal: She exhibits no edema or tenderness.   Neurological: She is alert and oriented to person, place, and time. She has normal strength. No cranial nerve deficit or sensory deficit.   Skin: Skin is warm and dry. No rash noted. No pallor.   Psychiatric: She has a normal mood and affect.         ED Course   Procedures  Labs Reviewed   CBC W/ AUTO DIFFERENTIAL - Abnormal; Notable for the following:        Result Value    RBC 3.99 (*)     MCH 31.3 (*)     All other components within normal limits   COMPREHENSIVE METABOLIC PANEL - Abnormal; Notable for the following:     Potassium 2.8 (*)     Chloride 112 (*)     CO2 16 (*)     Glucose 392 (*)     Calcium 6.7 (*)     Total Protein 5.6 (*)     Albumin 2.1 (*)     AST 8 (*)     ALT 7 (*)     All other components within normal limits   URINALYSIS, REFLEX TO URINE CULTURE - Abnormal; Notable for the following:     Protein, UA 2+ (*)     Glucose, UA 3+ (*)     Occult Blood UA 1+ (*)     All other components within normal limits    Narrative:     Preferred Collection Type->Urine, Clean Catch   MAGNESIUM - Abnormal; Notable for the following:     Magnesium 1.5 (*)     All other components within normal limits   HEMOGLOBIN A1C - Abnormal; Notable for the following:     Hemoglobin A1C >14.0 (*)     All other components within normal limits    Narrative:     ADD ON HEMOGLOBIN ORDER #[id number] PER DR. ISREAL MUIR  05/05/2018    14:24    PROTIME-INR   TROPONIN I   BETA - HYDROXYBUTYRATE, SERUM   URINALYSIS MICROSCOPIC    Narrative:     Preferred Collection Type->Urine, Clean Catch   HEMOGLOBIN A1C   TOXICOLOGY SCREEN, URINE, RANDOM (COMPLIANCE)   DRUG SCREEN PANEL, URINE EMERGENCY     Narrative:     Preferred Collection Type->Urine, Clean Catch  ADD ON URINE DRUG SCREEN ORDER #[id number] PER DR. GIDEON FRANCOIS    05/05/2018  14:25    CK   CALCIUM, IONIZED   TSH   PROCALCITONIN   TROPONIN I   BASIC METABOLIC PANEL   PHOSPHORUS   VITAMIN D   LACTIC ACID, PLASMA   PROLACTIN   POCT GLUCOSE MONITORING CONTINUOUS     EKG Readings: (Independently Interpreted)   Sinus tachycardia at 121 with no ST elevation or T wave inversion.       X-Rays:   Independently Interpreted Readings:   Chest X-Ray: Normal cardiac size. Clear lung fields.     Medical Decision Making:   History:   Old Medical Records: I decided to obtain old medical records.  Old Records Summarized: records from previous admission(s).       <> Summary of Records: Was just seen in the ED on March 7th for chest pain. She has DM, HLD, HTN, and history of MI and PAD with stents in her legs. On that visit her chest pain was more epigastric discomfort associated with nausea, vomiting, and diarrhea, and she was found to have a glucose of 600. She had 2 sets of troponin that were negative. She was treated with IV fluids and insulin.  Initial Assessment:   52 year old female with DM, HTN, HLD, and vascular disease, presents with multiple complaints. Given her dry mucous membranes and tachycardia, I am concerned about hyperglycemia and possible DKA. Given her vascular disease history, I am concerned about the possibility of atypical MI. Will check labs, give IV fluids, check troponin, and chest x-ray. Patient also describes recent life stressors that could be contributing to her complaints.  Independently Interpreted Test(s):   I have ordered and independently interpreted X-rays - see prior notes.  I have ordered and independently interpreted EKG Reading(s) - see prior notes  Clinical Tests:   Lab Tests: Ordered and Reviewed  Radiological Study: Ordered and Reviewed  Medical Tests: Ordered and Reviewed            Scribe Attestation:   Scribe #1: I  "performed the above scribed service and the documentation accurately describes the services I performed. I attest to the accuracy of the note.    Attending Attestation:           Physician Attestation for Scribe:      Comments: I, Dr. Milena Connell, personally performed the services described in this documentation. All medical record entries made by the scribe were at my direction and in my presence.  I have reviewed the chart and agree that the record reflects my personal performance and is accurate and complete. Milena Connell MD.  4:44 PM 05/05/2018      Attending ED Notes:   2:19 PM  I spoke with hospitalist, Dr. Hutchinson, who has agreed to admit the patient to hospital medicine.    Patient's  arrived and describes episodes that have been gradually worsening over the past 2 weeks.  He states that his wife is having episodes where she becomes extremely anxious and almost paranoid.  He states that she had 4 episodes the day before yesterday and 3 episodes yesterday.  He describes these episodes as her becoming suddenly very anxious and panicky.  The last episode occurred at work when she called him to come get her.  He had to stay on the phone with her the entire time he drove there and when he arrived she was reportedly looking all around as if someone were coming to get her.  He states that she held onto him in a tight hug for approximately 10-15 minutes.  After these episodes she is tearful and scared, she describes because she has had no control over herself and does not know what these episodes represent.    2:48 PM  Called to the patient's bedside after an episode. Nurse reports that the patient was staring up at the ceiling. She reportedly may have had nystagmus with the episode. For a few seconds, was not responding to the nurse. Afterwards, was extremely anxious, looking around, repeatedly asking for her , asking "what's wrong with me", and saying "y'all think I'm crazy don't you". I " am now concerned about he possibility of partial complex epilepsy or temporal lobe epilepsy. I have advised the hospital medicine team of this and have ordered a head CT.             Clinical Impression:   The primary encounter diagnosis was Hypokalemia. Diagnoses of Anxiety, Dehydration, and Hyperglycemia were also pertinent to this visit.    Disposition:   Disposition: Admitted                        Milena Connell MD  05/05/18 2937

## 2018-05-05 NOTE — ED NOTES
"Pt reports "chest pain and anxiety, dont know whats wrong just feeling like something is wrong, something is just not right" Pt tearful, and crying, feeling "impending doom, something is wrong, something isnt right".  aslo reports "at times my heart beats too fast feels like I have 3-4 hearts beating at one time". Pt reports "been happening 3-4 weeks when lost nephew and started back working full time"   LOC: The patient is awake, alert and aware of environment with an appropriate affect, the patient is oriented x 3 and speaking appropriately.  APPEARANCE: Patient resting comfortably and in no acute distress, patient is clean and well groomed, patient's clothing is properly fastened.  SKIN: The skin is warm and dry, intact, patient has normal skin turgor and moist mucus membranes.   RESPIRATORY: Airway is open and patent, respirations are spontaneous, patient has a normal effort and rate.  CARDIAC: Patient has a normal rate and rhythm, no periphreal edema noted, capillary refill < 3 seconds. Bilateral radial pulses +2  ABDOMEN: Soft and non tender to palpation, no distention noted. Bowel sounds present  NEUROLOGIC: PERRL, facial expression is symmetrical, patient moving all extremities spontaneously, normal sensation in all extremities when touched with a finger. Follows all commands appropriately  MUSCULOSKELETAL: No obvious deformities. Full ROM in all 4 extremities.     "

## 2018-05-05 NOTE — ED NOTES
Lab called and reported that our specimen was contaminated and potassium showed >9 and calcium showed 5.1; Dr. Barton made aware and another attempt is being made to draw specimen; charge nurse was made aware by Flora BRIGHT that we need another line before this pt. Can be transferred upstairs.  Another EKG preformed and Dr. Vasques shown.

## 2018-05-05 NOTE — HPI
"This is a 52 y.o. female with T2DM on insulin, HTN, HLD, GERD, PVD and history of MI, who presents to the ED with a complaint of chest pain/palpitation with associated anxiety. She report daily to 2x a day episode of feeling of impending doom over the last 3 weeks, episode would last 10-15 mins, where she report shortness of breath, extreme anxiety, fear and palpitations. She report feeling frozen during these episodes, she is aware of her surrounding, denies LOC, denies urinary/bowel incontinence or tongue biting.  who are present during these episode denies seizure like activities, no falls, no LOC as well. She feel that she is slowly "losing my mind". She feels "like something bad is about to happen and she does not have any control over it", and feels "like she is standing outside of her body and can see herself looking around". She endorses some chest pain over the past few days, a dry cough, swelling in her ankles, chills, polydipsia. She report that on 3/31 her nephew  and that she feels bad/guilty that she could not help him. Both her and her  thinks that this might have been the stressor that lead to these episodes. Pt also reports difficulty focusing, difficulty balancing, loss of appetite (food tastes like cardboard), chronic nausea, and chronic numbness in her legs, chronic cramps in her calves. She denies SOB, numbness in her arms, vomiting, fevers, abdominal pain, dysuria, frequent urination, abnormal bowel movements, and back pain. She had an NSTEMI in , and today's complaints feel different than her symptoms at that time. She has stents in her legs, report that she lost insurance and was unable to continue Plavix or Gabapentin. She has been taking all of her medications. For DM, she has been taking 50 mg Lantus and 500 mg Metformin. On a "bad day" her blood sugar can be around 250. Pt denies hx of anxiety or depression, not on any psych meds. But report that her mother had 2 " ""nervous breakdown" episodes. Denies hx of seizure.     In ED, pt was found to be hyperglycemic, hypocalcemic and hypokalemic. Hypertension noted as well.  She also had one of these episode, per nursing pt was just staring at the wall but no seizure activity, pt was aware that she was staring at the wall but she was "frozen". CT head ordered per ED team. Return to baseline on hospital medicine evaluation.      "

## 2018-05-06 PROBLEM — E83.51 HYPOCALCEMIA: Status: RESOLVED | Noted: 2018-05-05 | Resolved: 2018-05-06

## 2018-05-06 PROBLEM — E55.9 VITAMIN D DEFICIENCY: Status: ACTIVE | Noted: 2018-05-06

## 2018-05-06 PROBLEM — E87.6 HYPOKALEMIA: Status: RESOLVED | Noted: 2018-05-05 | Resolved: 2018-05-06

## 2018-05-06 LAB
ALBUMIN SERPL BCP-MCNC: 2.3 G/DL
ALP SERPL-CCNC: 108 U/L
ALT SERPL W/O P-5'-P-CCNC: 5 U/L
ANION GAP SERPL CALC-SCNC: 6 MMOL/L
AST SERPL-CCNC: 7 U/L
BASOPHILS # BLD AUTO: 0.04 K/UL
BASOPHILS NFR BLD: 0.5 %
BILIRUB SERPL-MCNC: 0.3 MG/DL
BNP SERPL-MCNC: 50 PG/ML
BUN SERPL-MCNC: 9 MG/DL
CALCIUM SERPL-MCNC: 8.7 MG/DL
CHLORIDE SERPL-SCNC: 103 MMOL/L
CO2 SERPL-SCNC: 25 MMOL/L
CREAT SERPL-MCNC: 0.8 MG/DL
DIFFERENTIAL METHOD: ABNORMAL
EOSINOPHIL # BLD AUTO: 0.1 K/UL
EOSINOPHIL NFR BLD: 1.4 %
ERYTHROCYTE [DISTWIDTH] IN BLOOD BY AUTOMATED COUNT: 12.9 %
EST. GFR  (AFRICAN AMERICAN): >60 ML/MIN/1.73 M^2
EST. GFR  (NON AFRICAN AMERICAN): >60 ML/MIN/1.73 M^2
GLUCOSE SERPL-MCNC: 315 MG/DL
HCT VFR BLD AUTO: 32.7 %
HGB BLD-MCNC: 10.8 G/DL
IMM GRANULOCYTES # BLD AUTO: 0.02 K/UL
IMM GRANULOCYTES NFR BLD AUTO: 0.3 %
LACTATE SERPL-SCNC: 1.2 MMOL/L
LYMPHOCYTES # BLD AUTO: 2.4 K/UL
LYMPHOCYTES NFR BLD: 30.5 %
MAGNESIUM SERPL-MCNC: 2 MG/DL
MCH RBC QN AUTO: 31 PG
MCHC RBC AUTO-ENTMCNC: 33 G/DL
MCV RBC AUTO: 94 FL
MONOCYTES # BLD AUTO: 0.5 K/UL
MONOCYTES NFR BLD: 6.7 %
NEUTROPHILS # BLD AUTO: 4.8 K/UL
NEUTROPHILS NFR BLD: 60.6 %
NRBC BLD-RTO: 0 /100 WBC
PHOSPHATE SERPL-MCNC: 2.1 MG/DL
PLATELET # BLD AUTO: 313 K/UL
PMV BLD AUTO: 10 FL
POCT GLUCOSE: 252 MG/DL (ref 70–110)
POCT GLUCOSE: 260 MG/DL (ref 70–110)
POCT GLUCOSE: 357 MG/DL (ref 70–110)
POTASSIUM SERPL-SCNC: 3.4 MMOL/L
PROT SERPL-MCNC: 6.4 G/DL
RBC # BLD AUTO: 3.48 M/UL
SODIUM SERPL-SCNC: 134 MMOL/L
WBC # BLD AUTO: 7.93 K/UL

## 2018-05-06 PROCEDURE — 80053 COMPREHEN METABOLIC PANEL: CPT

## 2018-05-06 PROCEDURE — 36415 COLL VENOUS BLD VENIPUNCTURE: CPT

## 2018-05-06 PROCEDURE — 99232 SBSQ HOSP IP/OBS MODERATE 35: CPT | Mod: ,,, | Performed by: HOSPITALIST

## 2018-05-06 PROCEDURE — 83735 ASSAY OF MAGNESIUM: CPT

## 2018-05-06 PROCEDURE — 85025 COMPLETE CBC W/AUTO DIFF WBC: CPT

## 2018-05-06 PROCEDURE — 95816 EEG AWAKE AND DROWSY: CPT

## 2018-05-06 PROCEDURE — 63600175 PHARM REV CODE 636 W HCPCS: Performed by: EMERGENCY MEDICINE

## 2018-05-06 PROCEDURE — G0378 HOSPITAL OBSERVATION PER HR: HCPCS

## 2018-05-06 PROCEDURE — 95816 EEG AWAKE AND DROWSY: CPT | Mod: 26,,, | Performed by: PSYCHIATRY & NEUROLOGY

## 2018-05-06 PROCEDURE — 63600175 PHARM REV CODE 636 W HCPCS: Performed by: HOSPITALIST

## 2018-05-06 PROCEDURE — 84100 ASSAY OF PHOSPHORUS: CPT

## 2018-05-06 PROCEDURE — 97802 MEDICAL NUTRITION INDIV IN: CPT

## 2018-05-06 PROCEDURE — 99221 1ST HOSP IP/OBS SF/LOW 40: CPT | Mod: AF,HB,, | Performed by: PSYCHIATRY & NEUROLOGY

## 2018-05-06 PROCEDURE — 83605 ASSAY OF LACTIC ACID: CPT

## 2018-05-06 PROCEDURE — 11000001 HC ACUTE MED/SURG PRIVATE ROOM

## 2018-05-06 PROCEDURE — 25000003 PHARM REV CODE 250: Performed by: HOSPITALIST

## 2018-05-06 RX ORDER — SODIUM CHLORIDE AND POTASSIUM CHLORIDE 150; 900 MG/100ML; MG/100ML
INJECTION, SOLUTION INTRAVENOUS CONTINUOUS
Status: DISPENSED | OUTPATIENT
Start: 2018-05-06 | End: 2018-05-07

## 2018-05-06 RX ORDER — LISINOPRIL 20 MG/1
40 TABLET ORAL DAILY
Status: DISCONTINUED | OUTPATIENT
Start: 2018-05-07 | End: 2018-05-07 | Stop reason: HOSPADM

## 2018-05-06 RX ORDER — CARVEDILOL 3.12 MG/1
6.25 TABLET ORAL 2 TIMES DAILY
Status: DISCONTINUED | OUTPATIENT
Start: 2018-05-06 | End: 2018-05-07 | Stop reason: HOSPADM

## 2018-05-06 RX ORDER — HYDROXYZINE PAMOATE 25 MG/1
50 CAPSULE ORAL EVERY 4 HOURS PRN
Status: DISCONTINUED | OUTPATIENT
Start: 2018-05-06 | End: 2018-05-07 | Stop reason: HOSPADM

## 2018-05-06 RX ORDER — POTASSIUM CHLORIDE 20 MEQ/1
40 TABLET, EXTENDED RELEASE ORAL ONCE
Status: COMPLETED | OUTPATIENT
Start: 2018-05-06 | End: 2018-05-06

## 2018-05-06 RX ORDER — ESCITALOPRAM OXALATE 10 MG/1
10 TABLET ORAL DAILY
Status: DISCONTINUED | OUTPATIENT
Start: 2018-05-06 | End: 2018-05-07 | Stop reason: HOSPADM

## 2018-05-06 RX ORDER — SODIUM,POTASSIUM PHOSPHATES 280-250MG
2 POWDER IN PACKET (EA) ORAL ONCE
Status: COMPLETED | OUTPATIENT
Start: 2018-05-06 | End: 2018-05-06

## 2018-05-06 RX ADMIN — INSULIN DETEMIR 50 UNITS: 100 INJECTION, SOLUTION SUBCUTANEOUS at 09:05

## 2018-05-06 RX ADMIN — INSULIN ASPART 2 UNITS: 100 INJECTION, SOLUTION INTRAVENOUS; SUBCUTANEOUS at 09:05

## 2018-05-06 RX ADMIN — GABAPENTIN 300 MG: 100 CAPSULE ORAL at 08:05

## 2018-05-06 RX ADMIN — SODIUM CHLORIDE AND POTASSIUM CHLORIDE: 9; 1.49 INJECTION, SOLUTION INTRAVENOUS at 03:05

## 2018-05-06 RX ADMIN — INSULIN ASPART 3 UNITS: 100 INJECTION, SOLUTION INTRAVENOUS; SUBCUTANEOUS at 08:05

## 2018-05-06 RX ADMIN — SODIUM CHLORIDE AND POTASSIUM CHLORIDE: 9; 1.49 INJECTION, SOLUTION INTRAVENOUS at 08:05

## 2018-05-06 RX ADMIN — ATORVASTATIN CALCIUM 40 MG: 20 TABLET, FILM COATED ORAL at 08:05

## 2018-05-06 RX ADMIN — HYDRALAZINE HYDROCHLORIDE 50 MG: 50 TABLET ORAL at 11:05

## 2018-05-06 RX ADMIN — DIBASIC SODIUM PHOSPHATE, MONOBASIC POTASSIUM PHOSPHATE AND MONOBASIC SODIUM PHOSPHATE 2 PACKET: 852; 155; 130 TABLET ORAL at 03:05

## 2018-05-06 RX ADMIN — INSULIN ASPART 6 UNITS: 100 INJECTION, SOLUTION INTRAVENOUS; SUBCUTANEOUS at 05:05

## 2018-05-06 RX ADMIN — ESCITALOPRAM OXALATE 10 MG: 10 TABLET ORAL at 11:05

## 2018-05-06 RX ADMIN — POTASSIUM CHLORIDE 40 MEQ: 1500 TABLET, EXTENDED RELEASE ORAL at 03:05

## 2018-05-06 RX ADMIN — SODIUM CHLORIDE AND POTASSIUM CHLORIDE: 9; 1.49 INJECTION, SOLUTION INTRAVENOUS at 02:05

## 2018-05-06 RX ADMIN — ASPIRIN 325 MG ORAL TABLET 325 MG: 325 PILL ORAL at 08:05

## 2018-05-06 RX ADMIN — LISINOPRIL 20 MG: 20 TABLET ORAL at 08:05

## 2018-05-06 RX ADMIN — DIBASIC SODIUM PHOSPHATE, MONOBASIC POTASSIUM PHOSPHATE AND MONOBASIC SODIUM PHOSPHATE 2 PACKET: 852; 155; 130 TABLET ORAL at 09:05

## 2018-05-06 RX ADMIN — ERGOCALCIFEROL 50000 UNITS: 1.25 CAPSULE ORAL at 08:05

## 2018-05-06 RX ADMIN — CARVEDILOL 6.25 MG: 3.12 TABLET, FILM COATED ORAL at 03:05

## 2018-05-06 RX ADMIN — INSULIN ASPART 6 UNITS: 100 INJECTION, SOLUTION INTRAVENOUS; SUBCUTANEOUS at 08:05

## 2018-05-06 RX ADMIN — FAMOTIDINE 20 MG: 20 TABLET, FILM COATED ORAL at 08:05

## 2018-05-06 RX ADMIN — GABAPENTIN 300 MG: 100 CAPSULE ORAL at 03:05

## 2018-05-06 RX ADMIN — ENOXAPARIN SODIUM 40 MG: 100 INJECTION SUBCUTANEOUS at 05:05

## 2018-05-06 RX ADMIN — INSULIN ASPART 5 UNITS: 100 INJECTION, SOLUTION INTRAVENOUS; SUBCUTANEOUS at 05:05

## 2018-05-06 RX ADMIN — SODIUM CHLORIDE AND POTASSIUM CHLORIDE: 9; 1.49 INJECTION, SOLUTION INTRAVENOUS at 11:05

## 2018-05-06 NOTE — HOSPITAL COURSE
Admitted on 5/5 for MDD, generalized anxiety and chest pain/palpitation episodes. Found to have uncontrolled DM as she was unable to get insulin due to insurance coverage. Work up of these episode has r/o infectious, metabolic causes. Neuro work up so far has been negative for EEG started, brain imaging were negative. Psych saw patient, started her on Lexapro 10mg

## 2018-05-06 NOTE — ASSESSMENT & PLAN NOTE
- Episodic of shortness of breath, extreme anxiety, fear and palpitations, report being frozen during these episodes, she is aware of her surrounding, denies LOC, denies urinary/bowel incontinence or tongue biting.   - CT head/ and MRI brain and EEG to r/o neurological cause ie seizure  - Treat her metabolic cause (hyperglycemia, hypocalcemia, hypokalemia)  - UA and CXR negative for infectious causes  - Psych consult to help evaluate for panic attack as cause given presentation and recent major stressor

## 2018-05-06 NOTE — NURSING
Pt. Taken to MRI by this nurse per  W/C  around 3 :30 am.allow telemetry monitor to be placed back on but not visi monitor.resting well with  at bedside.

## 2018-05-06 NOTE — ASSESSMENT & PLAN NOTE
- report having stents placed in her legs with chronic LE swelling  - Continue ASA  - Pt reported stop taking Plavix and statin after losing insurance, will restart plavix and add atorvastatin  - LE neuropathy?: restart gabapentin

## 2018-05-06 NOTE — ASSESSMENT & PLAN NOTE
- she reported started taking  20mg of lisinopril as she has been out  - increase to 40mg QD for now

## 2018-05-06 NOTE — H&P
"[facility] Hospital Medicine  History & Physical    Patient Name: Suyapa Connelly  MRN: [id number]  Admission Date: 2018  Attending Physician: Rosales Barton MD  Primary Care Provider: Erlinad Rahman NP    [facility] Medicine Team: [facility] MED A Rosales Barton MD     Patient information was obtained from patient, spouse/SO, past medical records and ER records.     Subjective:     Principal Problem:Acute encephalopathy    Chief Complaint:   Chief Complaint   Patient presents with    Chest Pain     nauseated and SOB. Intermittent CP x 3 weeks, Feels anxious. BP elevated, Called daughter to pick her up from work b/c she  felt like she was having a nervous breakdown. Denies psych hx.     Anxiety        HPI: This is a 52 y.o. female with T2DM on insulin, HTN, HLD, GERD, PVD and history of MI, who presents to the ED with a complaint of chest pain/palpitation with associated anxiety. She report daily to 2x a day episode of feeling of impending doom over the last 3 weeks, episode would last 10-15 mins, where she report shortness of breath, extreme anxiety, fear and palpitations. She report feeling frozen during these episodes, she is aware of her surrounding, denies LOC, denies urinary/bowel incontinence or tongue biting.  who are present during these episode denies seizure like activities, no falls, no LOC as well. She feel that she is slowly "losing my mind". She feels "like something bad is about to happen and she does not have any control over it", and feels "like she is standing outside of her body and can see herself looking around". She endorses some chest pain over the past few days, a dry cough, swelling in her ankles, chills, polydipsia. She report that on 3/31 her nephew  and that she feels bad/guilty that she could not help him. Both her and her  thinks that this might have been the stressor that lead to these episodes. Pt also reports difficulty focusing, difficulty " "balancing, loss of appetite (food tastes like cardboard), chronic nausea, and chronic numbness in her legs, chronic cramps in her calves. She denies SOB, numbness in her arms, vomiting, fevers, abdominal pain, dysuria, frequent urination, abnormal bowel movements, and back pain. She had an NSTEMI in 2010, and today's complaints feel different than her symptoms at that time. She has stents in her legs, report that she lost insurance and was unable to continue Plavix or Gabapentin. She has been taking all of her medications. For DM, she has been taking 50 mg Lantus and 500 mg Metformin. On a "bad day" her blood sugar can be around 250. Pt denies hx of anxiety or depression, not on any psych meds. But report that her mother had 2 "nervous breakdown" episodes. Denies hx of seizure.     In ED, pt was found to be hyperglycemic, hypocalcemic and hypokalemic. Hypertension noted as well.  She also had one of these episode, per nursing pt was just staring at the wall but no seizure activity, pt was aware that she was staring at the wall but she was "frozen". CT head ordered per ED team. Return to baseline on hospital medicine evaluation.        Past Medical History:   Diagnosis Date    Diabetes mellitus     type 2    GERD (gastroesophageal reflux disease)     Hyperlipemia     Hypertension     Myocardial infarction 2010    minor-caused by stress per pt.       Past Surgical History:   Procedure Laterality Date    Angiogram - Right Extremity Right 7/9/15    angiogram-left leg  10/6/15       Review of patient's allergies indicates:   Allergen Reactions    Pcn [penicillins]      rash       No current facility-administered medications on file prior to encounter.      Current Outpatient Prescriptions on File Prior to Encounter   Medication Sig    aspirin 325 MG tablet Take 1 tablet (325 mg total) by mouth once daily.    lisinopril 10 MG tablet Take 20 mg by mouth once daily.     [DISCONTINUED] pravastatin (PRAVACHOL) 20 " MG tablet     clopidogrel (PLAVIX) 75 mg tablet     famotidine (PEPCID) 20 MG tablet Take 1 tablet (20 mg total) by mouth 2 (two) times daily.    gabapentin (NEURONTIN) 300 MG capsule     LEVEMIR 100 unit/mL injection Inject 50 Units into the skin every evening.     [DISCONTINUED] clindamycin (CLEOCIN) 150 MG capsule     [DISCONTINUED] docusate sodium (COLACE) 100 MG capsule Take 1 capsule (100 mg total) by mouth 2 (two) times daily.    [DISCONTINUED] esomeprazole (NEXIUM) 40 MG capsule Take 40 mg by mouth before breakfast.    [DISCONTINUED] hydrocodone-acetaminophen 5-325mg (NORCO) 5-325 mg per tablet Take 1 tablet by mouth every 6 (six) hours as needed for Pain.    [DISCONTINUED] ondansetron (ZOFRAN) 4 MG tablet Take 1 tablet (4 mg total) by mouth every 6 (six) hours.    [DISCONTINUED] tramadol (ULTRAM) 50 mg tablet Take 1 tablet (50 mg total) by mouth every 8 (eight) hours as needed for Pain.     Family History     None        Social History Main Topics    Smoking status: Never Smoker    Smokeless tobacco: Never Used    Alcohol use No    Drug use: Yes     Types: Marijuana      Comment: used yesterday    Sexual activity: Not on file     Review of Systems   Constitutional: Positive for appetite change and chills. Negative for fever.   HENT: Negative for sore throat.    Respiratory: Positive for cough (dry). Negative for shortness of breath.    Cardiovascular: Positive for chest pain, palpitations and leg swelling (ankles).   Gastrointestinal: Positive for nausea (chronic). Negative for abdominal pain, constipation, diarrhea and vomiting.   Endocrine: Positive for polydipsia.   Genitourinary: Negative for dysuria and frequency.   Musculoskeletal: Negative for back pain.        Positive for chronic cramping in her calves.   Skin: Negative for rash.   Neurological: Negative for weakness.        Positive for difficulty balancing. Negative for numbness in her arms. Positive for chronic numbness in her  legs   Hematological: Does not bruise/bleed easily.   Psychiatric/Behavioral: Positive for decreased concentration. The patient is nervous/anxious.          Objective:     Vital Signs (Most Recent):  Temp: 98.6 °F (37 °C) (05/05/18 1530)  Pulse: 107 (05/05/18 1530)  Resp: 12 (05/05/18 1530)  BP: (!) 162/82 (05/05/18 1530)  SpO2: 99 % (05/05/18 1530) Vital Signs (24h Range):  Temp:  [98.3 °F (36.8 °C)-98.7 °F (37.1 °C)] 98.6 °F (37 °C)  Pulse:  [102-120] 107  Resp:  [12-20] 12  SpO2:  [95 %-99 %] 99 %  BP: (162-202)/() 162/82     Weight: 130.2 kg (287 lb)  Body mass index is 47.03 kg/m².    Physical Exam    Nursing note and vitals reviewed.  Constitutional: She appears well-developed and well-nourished. No distress.   HENT:   Head: Atraumatic.   Mouth/Throat: No oropharyngeal exudate.   Dry mucous membranes.    Eyes: EOM are normal. Pupils are equal, round, and reactive to light.   Neck: Neck supple. No JVD present.   Cardiovascular: Regular rhythm and normal heart sounds. Tachycardia present.  Exam reveals no gallop and no friction rub.    No murmur heard.  Pulmonary/Chest: Breath sounds normal. No respiratory distress. She has no wheezes. She has no rales.   Abdominal: Soft. Bowel sounds are normal. She exhibits no distension. There is no tenderness.   Musculoskeletal: She exhibits chronic LE edema (bilat) or tenderness.   Neurological: She is alert and oriented to person, place, and time. She has normal strength. No cranial nerve deficit or sensory deficit.   Skin: Skin is warm and dry. No rash noted. No pallor.   Psychiatric: She has a normal mood and affect.      Significant Labs:   A1C:   Recent Labs  Lab 05/05/18  1134   HGBA1C >14.0*     CBC:   Recent Labs  Lab 05/05/18  1134   WBC 9.35   HGB 12.5   HCT 37.6        CMP:   Recent Labs  Lab 05/05/18  1134      K 2.8*   *   CO2 16*   *   BUN 10   CREATININE 0.7   CALCIUM 6.7*   PROT 5.6*   ALBUMIN 2.1*   BILITOT 0.2   ALKPHOS 99    AST 8*   ALT 7*   ANIONGAP 11   EGFRNONAA >60.0     POCT Glucose: No results for input(s): POCTGLUCOSE in the last 48 hours.    Significant Imaging: I have reviewed all pertinent imaging results/findings within the past 24 hours.    Assessment/Plan:     * Acute encephalopathy    - Episodic of shortness of breath, extreme anxiety, fear and palpitations, report being frozen during these episodes, she is aware of her surrounding, denies LOC, denies urinary/bowel incontinence or tongue biting.   - CT head/ and MRI brain and EEG to r/o neurological cause ie seizure  - Treat her metabolic cause (hyperglycemia, hypocalcemia, hypokalemia)  - UA and CXR negative for infectious causes  - Psych consult to help evaluate for panic attack as cause given presentation and recent major stressor          Panic attack as reaction to stress    - recent nephew passing away on 3/31, now with symptoms similar to panic attack episode  - pt had an episode in ED  - Psych consult         Type 2 diabetes mellitus without complication, with long-term current use of insulin    - hyperglycemia in ED, glucose at 392, not in DKA given neg ketones in urine and negative beta-hydroxybutyrate  - A1C is >14, which means estimate baseline glucose for patient is ~357  - Reported on Lantus 50U QHS and metformin 500mg BID, not on short acting  - start 6U Novolog with meal continue 50U of Levemir and monitor  - POCT glucose check ACHS  - Metabolic acidosis: s/p IVF in ED, will repeat lab work and monitor            Hypokalemia    - replete in ED , repeat pending          Essential hypertension    - she reported started taking  20mg of lisinopril as she has been out  - restart 20mg here          Malnutrition of moderate degree    - albumin at 2  - nutrition consulted to educate pt on Diabetic diet          Hypocalcemia    - checking iCa and Vit D  - replete if needed          Peripheral vascular disease    - Report chronic LE swelling  - CTM, can  consider lasix if needed          Peripheral artery disease    - report having stents placed in her legs with chronic LE swelling  - Continue ASA  - Pt reported stop taking Plavix and statin after losing insurance, will restart plavix and add atorvastatin  - LE neuropathy?: restart gabapentin            VTE Risk Mitigation         Ordered     enoxaparin injection 40 mg  Daily      05/05/18 1755     IP VTE HIGH RISK PATIENT  Once      05/05/18 1755     Place HAILEY hose  Until discontinued      05/05/18 1426     Place sequential compression device  Until discontinued      05/05/18 1426             Rosales Barton MD  Department of Hospital Medicine   [facility]

## 2018-05-06 NOTE — PROGRESS NOTES
"[facility] Hospital Medicine  Progress Note    Patient Name: Suyapa Connelly  MRN: [id number]  Patient Class: IP- Inpatient   Admission Date: 2018  Length of Stay: 1 days  Attending Physician: Rosales Barton MD  Primary Care Provider: Erlinda Rahman NP    [facility] Team: [facility] A Rosales Barton MD    Subjective:     Principal Problem:Acute encephalopathy    HPI:  This is a 52 y.o. female with T2DM on insulin, HTN, HLD, GERD, PVD and history of MI, who presents to the ED with a complaint of chest pain/palpitation with associated anxiety. She report daily to 2x a day episode of feeling of impending doom over the last 3 weeks, episode would last 10-15 mins, where she report shortness of breath, extreme anxiety, fear and palpitations. She report feeling frozen during these episodes, she is aware of her surrounding, denies LOC, denies urinary/bowel incontinence or tongue biting.  who are present during these episode denies seizure like activities, no falls, no LOC as well. She feel that she is slowly "losing my mind". She feels "like something bad is about to happen and she does not have any control over it", and feels "like she is standing outside of her body and can see herself looking around". She endorses some chest pain over the past few days, a dry cough, swelling in her ankles, chills, polydipsia. She report that on 3/31 her nephew  and that she feels bad/guilty that she could not help him. Both her and her  thinks that this might have been the stressor that lead to these episodes. Pt also reports difficulty focusing, difficulty balancing, loss of appetite (food tastes like cardboard), chronic nausea, and chronic numbness in her legs, chronic cramps in her calves. She denies SOB, numbness in her arms, vomiting, fevers, abdominal pain, dysuria, frequent urination, abnormal bowel movements, and back pain. She had an NSTEMI in , and today's complaints " "feel different than her symptoms at that time. She has stents in her legs, report that she lost insurance and was unable to continue Plavix or Gabapentin. She has been taking all of her medications. For DM, she has been taking 50 mg Lantus and 500 mg Metformin. On a "bad day" her blood sugar can be around 250. Pt denies hx of anxiety or depression, not on any psych meds. But report that her mother had 2 "nervous breakdown" episodes. Denies hx of seizure.     In ED, pt was found to be hyperglycemic, hypocalcemic and hypokalemic. Hypertension noted as well.  She also had one of these episode, per nursing pt was just staring at the wall but no seizure activity, pt was aware that she was staring at the wall but she was "frozen". CT head ordered per ED team. Return to baseline on hospital medicine evaluation.        Hospital Course:  Admitted on 5/5 for MDD, generalized anxiety and chest pain/palpitation episodes. Found to have uncontrolled DM as she was unable to get insulin due to insurance coverage. Work up of these episode has r/o infectious, metabolic causes. Neuro work up so far has been negative for EEG started, brain imaging were negative. Psych saw patient, started her on Lexapro 10mg    Interval History: Doing well today, Getting EEG at time of eval      Review of Systems   Constitutional:  Negative for fever.   HENT: Negative for sore throat.    Respiratory:  Negative for shortness of breath.    Cardiovascular: Positive for chest pain, palpitations and leg swelling (ankles).   Gastrointestinal: Negative for abdominal pain, constipation, diarrhea and vomiting.   Endocrine: Positive for polydipsia.   Genitourinary: Negative for dysuria and frequency.   Musculoskeletal: Negative for back pain.   Skin: Negative for rash.   Neurological: Negative for weakness. Positive for chronic numbness in her legs   Hematological: Does not bruise/bleed easily.   Psychiatric/Behavioral: Positive for decreased concentration. The " patient is nervous/anxious.    Objective:     Vital Signs (Most Recent):  Temp: 98.7 °F (37.1 °C) (05/06/18 0400)  Pulse: 104 (05/06/18 1237)  Resp: 18 (05/06/18 1237)  BP: (!) 167/97 (05/06/18 1237)  SpO2: 96 % (05/06/18 1237) Vital Signs (24h Range):  Temp:  [98.2 °F (36.8 °C)-98.7 °F (37.1 °C)] 98.7 °F (37.1 °C)  Pulse:  [] 104  Resp:  [12-19] 18  SpO2:  [92 %-99 %] 96 %  BP: (138-176)/() 167/97     Weight: 130.2 kg (287 lb)  Body mass index is 47.03 kg/m².  No intake or output data in the 24 hours ending 05/06/18 1426     Physical Exam  Nursing note and vitals reviewed.  Constitutional: She appears well-developed and well-nourished. No distress.   Cardiovascular: Regular rhythm and normal heart sounds.  Exam reveals no gallop and no friction rub.    No murmur heard.  Pulmonary/Chest: Breath sounds normal. No respiratory distress. She has no wheezes. She has no rales.   Abdominal: Soft. Bowel sounds are normal. She exhibits no distension. There is no tenderness.   Musculoskeletal: She exhibits chronic LE edema (bilat) or tenderness.   Neurological: She is alert and oriented to person, place, and time. She has normal strength. No cranial nerve deficit or sensory deficit.   Skin: Skin is warm and dry. No rash noted. No pallor.   Psychiatric: She has a normal mood and affect.     MELD-Na score: 6 at 5/6/2018  6:36 AM  MELD score: 6 at 5/6/2018  6:36 AM  Calculated from:  Serum Creatinine: 0.8 mg/dL (Rounded to 1) at 5/6/2018  6:36 AM  Serum Sodium: 134 mmol/L at 5/6/2018  6:36 AM  Total Bilirubin: 0.3 mg/dL (Rounded to 1) at 5/6/2018  6:36 AM  INR(ratio): 0.9 (Rounded to 1) at 5/5/2018 11:34 AM  Age: 52 years    Significant Labs:  CBC:    Recent Labs  Lab 05/05/18  1134 05/06/18  0636   WBC 9.35 7.93   HGB 12.5 10.8*   HCT 37.6 32.7*    313     CMP:    Recent Labs  Lab 05/05/18  1134 05/05/18 2006 05/06/18  0636    135* 134*   K 2.8* 3.6 3.4*   * 102 103   CO2 16* 24 25   *  335* 315*   BUN 10 10 9   CREATININE 0.7 0.9 0.8   CALCIUM 6.7* 8.8 8.7   PROT 5.6* 7.0 6.4   ALBUMIN 2.1* 2.6* 2.3*   BILITOT 0.2 0.4 0.3   ALKPHOS 99 115 108   AST 8* 7* 7*   ALT 7* 5* 5*   ANIONGAP 11 9 6*   EGFRNONAA >60.0 >60.0 >60.0     PTINR:    Recent Labs  Lab 05/05/18  1134   INR 0.9       Significant Procedures:   Dobutamine Stress Test with Color Flow: No results found for this or any previous visit.    None    Assessment/Plan:      * Acute encephalopathy    - Episodic of shortness of breath, extreme anxiety, fear and palpitations, report being frozen during these episodes, she is aware of her surrounding, denies LOC, denies urinary/bowel incontinence or tongue biting.   - CT head/ and MRI brain: negative for structural issue, she is getting EEG 5/6 to r/o neurological cause ie seizure  - Fixed metabolic cause (hyperglycemia, hypocalcemia, hypokalemia)  - UA and CXR negative for infectious causes  - TSH wnl  - Psych consult to help evaluate for panic attack as cause given presentation and recent major stressor, Lexapro started 10mg QD          Panic attack as reaction to stress    MDD, moderate, recurrent, without psychotic features  Generalized Anxiety Disorder  Bereavement  - recent nephew passing away on 3/31, now with symptoms similar to panic attack episode  - Per psych, Start Lexapro 10 mg PO daily.  Vistaril 50 mg PO BID PRN anxiety.  - Outpatient psychotherapy and can follow at [facility] after hospital discharge.          Type 2 diabetes mellitus without complication, with long-term current use of insulin    - hyperglycemia in ED, glucose at 392, not in DKA given neg ketones in urine and negative beta-hydroxybutyrate  - A1C is >14, which means estimate baseline glucose for patient is ~357  - Reported on Lantus 50U QHS and metformin 500mg BID, (pt report not consistently taking meds due to insurance issue)  - Add 6U Novolog with meal continue 50U of Levemir and monitor  - POCT glucose check ACHS  -  Metabolic acidosis resolved    Will need to make sure patient is able to afford medication before D.C          Vitamin D deficiency    - supplementation started  - will need outpatient monitoring on D/C          Essential hypertension    - she reported started taking  20mg of lisinopril as she has been out  - increase to 40mg QD for now          Malnutrition of moderate degree    - albumin at 2  - nutrition consulted to educate pt on Diabetic diet          Hypocalcemia    - checking iCa and Vit D  - replete if needed          Peripheral vascular disease    - Report chronic LE swelling  - CTM, can consider lasix if needed          Peripheral artery disease    - report having stents placed in her legs with chronic LE swelling  - Continue ASA  - Pt reported stop taking Plavix and statin after losing insurance, will restart plavix and add atorvastatin  - LE neuropathy?: restart gabapentin            VTE Risk Mitigation         Ordered     enoxaparin injection 40 mg  Daily      05/05/18 1755     IP VTE HIGH RISK PATIENT  Once      05/05/18 1755     Place HAILEY hose  Until discontinued      05/05/18 1426     Place sequential compression device  Until discontinued      05/05/18 1426              Rosales Barton MD  Department of Hospital Medicine   [facility]

## 2018-05-06 NOTE — ASSESSMENT & PLAN NOTE
- report having stents placed in her legs with chronic LE swelling  - Continue ASA  - Pt reported stop taking Plavix after losing insurance, will restart  - LE neuropathy?: restart gabapentin

## 2018-05-06 NOTE — ASSESSMENT & PLAN NOTE
- hyperglycemia in ED, glucose at 392, not in DKA given neg ketones in urine and negative beta-hydroxybutyrate  - A1C is >14, which means estimate baseline glucose for patient is ~357  - Reported on Lantus 50U QHS and metformin 500mg BID, not on short acting  - start 6U Novolog with meal continue 50U of Levemir and monitor  - POCT glucose check ACHS  - Metabolic acidosis: s/p IVF in ED, will repeat lab work and monitor

## 2018-05-06 NOTE — ASSESSMENT & PLAN NOTE
- hyperglycemia in ED, glucose at 392, not in DKA given neg ketones in urine and negative beta-hydroxybutyrate  - A1C is >14, which means estimate baseline glucose for patient is ~357  - Reported on Lantus 50U QHS and metformin 500mg BID, (pt report not consistently taking meds due to insurance issue)  - Add 6U Novolog with meal continue 50U of Levemir and monitor  - POCT glucose check ACHS  - Metabolic acidosis resolved    Will need to make sure patient is able to afford medication before D.C

## 2018-05-06 NOTE — ASSESSMENT & PLAN NOTE
- recent nephew passing away on 3/31, now with symptoms similar to panic attack episode  - pt had an episode in ED  - Psych consult

## 2018-05-06 NOTE — ASSESSMENT & PLAN NOTE
MDD, moderate, recurrent, without psychotic features  Generalized Anxiety Disorder  Bereavement  - recent nephew passing away on 3/31, now with symptoms similar to panic attack episode  - Per psych, Start Lexapro 10 mg PO daily.  Vistaril 50 mg PO BID PRN anxiety.  - Outpatient psychotherapy and can follow at [facility] after hospital discharge.

## 2018-05-06 NOTE — ASSESSMENT & PLAN NOTE
- Episodic of shortness of breath, extreme anxiety, fear and palpitations, report being frozen during these episodes, she is aware of her surrounding, denies LOC, denies urinary/bowel incontinence or tongue biting.   - CT head/ and MRI brain: negative for structural issue, she is getting EEG 5/6 to r/o neurological cause ie seizure  - Fixed metabolic cause (hyperglycemia, hypocalcemia, hypokalemia)  - UA and CXR negative for infectious causes  - TSH wnl  - Psych consult to help evaluate for panic attack as cause given presentation and recent major stressor, Lexapro started 10mg QD

## 2018-05-06 NOTE — CONSULTS
"Food & Nutrition  Education    Diet Education: Diabetic Diet  Time Spent: 15 minutes  Learners: patient      Nutrition Education provided with handouts: Diabetic Diet, Plate Method      Comments: Pt reports not receiving insulin after losing job with insurance. Pt reports not "being a bread person" but has multiple stressors in life at this time likely contributing to high blood sugar in addition to lack of medication. RD provided education on carbohydrates, serving sizes and building a plate. Pt verbalized understanding. RD encouraged pt to talk with MD about outpatient diabetes education services. Handout left at bedside.  All questions and concerns answered. Dietitian's contact information provided.     RD noted "malnutrition of moderate degree" located in pt's problem list. Pt does not meet malnutrition criteria at this time. Nutrition focused physical assessment performed. Pt with increased weight over past few months, increased intake likely due to stress and no physical signs of muscle or fat depletion.      Follow-Up: LOS Day 10 or PRN    Please Re-consult as needed    Thanks!  JOE Young, LDN  [id number]  "

## 2018-05-06 NOTE — SUBJECTIVE & OBJECTIVE
Interval History: Doing well today, Getting EEG at time of eval      Review of Systems   Constitutional:  Negative for fever.   HENT: Negative for sore throat.    Respiratory:  Negative for shortness of breath.    Cardiovascular: Positive for chest pain, palpitations and leg swelling (ankles).   Gastrointestinal: Negative for abdominal pain, constipation, diarrhea and vomiting.   Endocrine: Positive for polydipsia.   Genitourinary: Negative for dysuria and frequency.   Musculoskeletal: Negative for back pain.   Skin: Negative for rash.   Neurological: Negative for weakness. Positive for chronic numbness in her legs   Hematological: Does not bruise/bleed easily.   Psychiatric/Behavioral: Positive for decreased concentration. The patient is nervous/anxious.    Objective:     Vital Signs (Most Recent):  Temp: 98.7 °F (37.1 °C) (05/06/18 0400)  Pulse: 104 (05/06/18 1237)  Resp: 18 (05/06/18 1237)  BP: (!) 167/97 (05/06/18 1237)  SpO2: 96 % (05/06/18 1237) Vital Signs (24h Range):  Temp:  [98.2 °F (36.8 °C)-98.7 °F (37.1 °C)] 98.7 °F (37.1 °C)  Pulse:  [] 104  Resp:  [12-19] 18  SpO2:  [92 %-99 %] 96 %  BP: (138-176)/() 167/97     Weight: 130.2 kg (287 lb)  Body mass index is 47.03 kg/m².  No intake or output data in the 24 hours ending 05/06/18 1426     Physical Exam  Nursing note and vitals reviewed.  Constitutional: She appears well-developed and well-nourished. No distress.   Cardiovascular: Regular rhythm and normal heart sounds.  Exam reveals no gallop and no friction rub.    No murmur heard.  Pulmonary/Chest: Breath sounds normal. No respiratory distress. She has no wheezes. She has no rales.   Abdominal: Soft. Bowel sounds are normal. She exhibits no distension. There is no tenderness.   Musculoskeletal: She exhibits chronic LE edema (bilat) or tenderness.   Neurological: She is alert and oriented to person, place, and time. She has normal strength. No cranial nerve deficit or sensory deficit.   Skin:  Skin is warm and dry. No rash noted. No pallor.   Psychiatric: She has a normal mood and affect.     MELD-Na score: 6 at 5/6/2018  6:36 AM  MELD score: 6 at 5/6/2018  6:36 AM  Calculated from:  Serum Creatinine: 0.8 mg/dL (Rounded to 1) at 5/6/2018  6:36 AM  Serum Sodium: 134 mmol/L at 5/6/2018  6:36 AM  Total Bilirubin: 0.3 mg/dL (Rounded to 1) at 5/6/2018  6:36 AM  INR(ratio): 0.9 (Rounded to 1) at 5/5/2018 11:34 AM  Age: 52 years    Significant Labs:  CBC:    Recent Labs  Lab 05/05/18  1134 05/06/18  0636   WBC 9.35 7.93   HGB 12.5 10.8*   HCT 37.6 32.7*    313     CMP:    Recent Labs  Lab 05/05/18  1134 05/05/18  2006 05/06/18  0636    135* 134*   K 2.8* 3.6 3.4*   * 102 103   CO2 16* 24 25   * 335* 315*   BUN 10 10 9   CREATININE 0.7 0.9 0.8   CALCIUM 6.7* 8.8 8.7   PROT 5.6* 7.0 6.4   ALBUMIN 2.1* 2.6* 2.3*   BILITOT 0.2 0.4 0.3   ALKPHOS 99 115 108   AST 8* 7* 7*   ALT 7* 5* 5*   ANIONGAP 11 9 6*   EGFRNONAA >60.0 >60.0 >60.0     PTINR:    Recent Labs  Lab 05/05/18  1134   INR 0.9       Significant Procedures:   Dobutamine Stress Test with Color Flow: No results found for this or any previous visit.    None

## 2018-05-06 NOTE — CONSULTS
"Consultation-Liaison Psychiatry Consult Note      Chief Complaint / Reason for Consult:     Panic attacks     Subjective:     History of Present Illness:   This is a 52 y.o. female with T2DM on insulin, HTN, HLD, GERD, PVD and history of MI, who presents to the ED with a complaint of chest pain/palpitation with associated anxiety.  She was admitted for workup of episodes of shortness of breath, extreme anxiety, fear and palpitations. She reported feeling frozen during these episodes, she is aware of her surrounding, denies LOC, denies urinary/bowel incontinence or tongue biting.  was present during these episode denies seizure like activities, no falls, no LOC as well.  EEG has been ordered as well as head imaging.  Psychiatry was consulted for recommendations for medication management of panic attacks.    Patient was interviewed alone and with her  at bedside. Patient reports that she has struggled with depression and anxiety for several years, since [place], but never sought professional help.  She lost her nephew suddenly last month, and since that time has had "episodes" of increased frequency as described above.  She feels helpless and out of control during these episodes.  During the evaluation, patient experienced an episode where she hyperventilated, tilted her head back, and was unable to answer questions.  Her  held her and reassured her in a calm voice.  Patient did not lose consciousness or bowel or bladder function.  The episode lasted 2-3 minutes, and afterward patient had no confusion and was able to resume the interview.  She voiced feelings of guilt regarding her nephews death, anxiety regarding finances, family, and health, and poor appetite, difficulty falling asleep, and anhedonia.  She denied ever having suicidal thoughts or intentions.  Patient provided informed consent for an SSRI and was provided reassurance regarding the possibility for improvement of her symptoms.  She " is open to seeking psychotherapy      Collateral: , spoke to at bedside  Patient's  corroborated patient's history regarding her anxiety and panic since her nephew's death.  He has spent more time caring for patient in the last month and describes her previously as high functioning and self sufficient.  He feels her grief is contributing to her current condition and confesses the two have spoken infrequently about her nephew.  Patient does not like to be alone recently and relies on  for comfort.  He describes these episodes has happening when medical staff enter the room and when the two are home alone.  He does not feel the patient is in imminent danger and is agreeable to plan of starting SSRI with follow up psychotherapy.     Medical Review Of Systems:  Pertinent items are noted in HPI.    Psychiatric Review Of Systems - Is patient experiencing or having changes in:  sleep: yes  appetite: yes  weight: no  energy/anergy: yes  interest/pleasure/anhedonia: yes  somatic symptoms: no  libido: no  anxiety/panic: yes  guilty/hopelessness: yes  concentration: yes  S.I.B.s/risky behavior: no  any drugs: occasional marijuana use  alcohol: no     Allergies:  Pcn [penicillins]    Past Medical/Surgical History  Past Medical History:   Diagnosis Date    Diabetes mellitus     type 2    GERD (gastroesophageal reflux disease)     Hyperlipemia     Hypertension     Myocardial infarction 2010    minor-caused by stress per pt.      has a past medical history of Diabetes mellitus; GERD (gastroesophageal reflux disease); Hyperlipemia; Hypertension; and Myocardial infarction (2010).  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  Education: high school diploma/GED  Special Ed: no  Housing Status: with  in [facility]  History of phys/sexual abuse: no  Access to gun: no    Substance Abuse History:  Recreational Drugs: marijuana  Use of Alcohol: occasional, social use  Rehab History:no   Tobacco Use:no  Use of Caffeine: denies use  Use of OTC: denied  Legal consequences of chemical use: no    Legal History:  Past Charges/Incarcerations:no   Pending charges:no     Family Psychiatric History:   Mother - "nervous breakdowns"    Objective:     Current Medications:  Infusions:   0/9% NACL & POTASSIUM CHLORIDE 20 MEQ/L 125 mL/hr at 05/06/18 0225     Scheduled:   aspirin  325 mg Oral Daily    atorvastatin  40 mg Oral Daily    enoxaparin  40 mg Subcutaneous Daily    ergocalciferol  50,000 Units Oral Q7 Days    famotidine  20 mg Oral BID    gabapentin  300 mg Oral TID    insulin aspart U-100  6 Units Subcutaneous TIDWM    insulin detemir U-100  50 Units Subcutaneous QHS    lisinopril  20 mg Oral Daily     PRN:  acetaminophen, dextrose 50%, dextrose 50%, glucagon (human recombinant), glucose, glucose, hydrALAZINE, insulin aspart U-100, lorazepam, ondansetron, polyethylene glycol, promethazine, ramelteon, sodium chloride 0.9%    Home Medications:  Prior to Admission medications    Medication Sig Start Date End Date Taking? Authorizing Provider   aspirin 325 MG tablet Take 1 tablet (325 mg total) by mouth once daily. 10/9/15 5/5/18 Yes Mao Merida MD   insulin glargine (LANTUS) 100 unit/mL injection Inject into the skin every evening.   Yes Historical Provider, MD   lisinopril 10 MG tablet Take 20 mg by mouth once daily.    Yes Historical Provider, MD   metFORMIN (GLUCOPHAGE) 500 MG tablet Take 500 mg by mouth 2 (two) times daily with meals.   Yes Historical Provider, MD   clopidogrel (PLAVIX) 75 mg tablet  " 2/10/16   Historical Provider, MD   famotidine (PEPCID) 20 MG tablet Take 1 tablet (20 mg total) by mouth 2 (two) times daily. 3/3/18 3/3/19  Eliot Whitmore MD   gabapentin (NEURONTIN) 300 MG capsule  11/14/15   Historical Provider, MD   LEVEMIR 100 unit/mL injection Inject 50 Units into the skin every evening.  9/27/15   Historical Provider, MD     Vital Signs:  Temp:  [98.2 °F (36.8 °C)-98.7 °F (37.1 °C)]   Pulse:  []   Resp:  [12-20]   BP: (138-202)/()   SpO2:  [92 %-99 %]     Physical Exam:  Gen: Alert, calm, cooperative, NAD   Head: MMM   Lungs: respirations unlabored   Chest wall: No tenderness or deformity   Heart: RRR   Abdomen: +BS   Extremities:  no cyanosis or edema   Pulses: 2+ and symmetric all extremities   Skin: no rashes or lesions   Neurologic: CN II-XII grossly intact     Mental Status Exam:  Appearance: unremarkable, age appropriate, lying in bed   Behavior: friendly and cooperative   Speech/Language: normal tone, normal rate, normal pitch, normal volume   Mood: anxious, depressed   Affect: mood congruent   Thought Process:  normal and logical   Thought Content: normal, no suicidality, no homicidality, delusions, or paranoia   Orientation: grossly intact   Cognition: grossly intact   Insight: fair   Judgment: fair     Laboratory Data:  Recent Results (from the past 48 hour(s))   CBC auto differential    Collection Time: 05/05/18 11:34 AM   Result Value Ref Range    WBC 9.35 3.90 - 12.70 K/uL    RBC 3.99 (L) 4.00 - 5.40 M/uL    Hemoglobin 12.5 12.0 - 16.0 g/dL    Hematocrit 37.6 37.0 - 48.5 %    MCV 94 82 - 98 fL    MCH 31.3 (H) 27.0 - 31.0 pg    MCHC 33.2 32.0 - 36.0 g/dL    RDW 13.0 11.5 - 14.5 %    Platelets 308 150 - 350 K/uL    MPV 11.6 9.2 - 12.9 fL    Immature Granulocytes 0.4 0.0 - 0.5 %    Gran # (ANC) 6.2 1.8 - 7.7 K/uL    Immature Grans (Abs) 0.04 0.00 - 0.04 K/uL    Lymph # 2.3 1.0 - 4.8 K/uL    Mono # 0.7 0.3 - 1.0 K/uL    Eos # 0.1 0.0 - 0.5 K/uL    Baso # 0.04  0.00 - 0.20 K/uL    nRBC 0 0 /100 WBC    Gran% 65.8 38.0 - 73.0 %    Lymph% 25.0 18.0 - 48.0 %    Mono% 7.2 4.0 - 15.0 %    Eosinophil% 1.2 0.0 - 8.0 %    Basophil% 0.4 0.0 - 1.9 %    Differential Method Automated    Comprehensive metabolic panel    Collection Time: 05/05/18 11:34 AM   Result Value Ref Range    Sodium 139 136 - 145 mmol/L    Potassium 2.8 (L) 3.5 - 5.1 mmol/L    Chloride 112 (H) 95 - 110 mmol/L    CO2 16 (L) 23 - 29 mmol/L    Glucose 392 (H) 70 - 110 mg/dL    BUN, Bld 10 6 - 20 mg/dL    Creatinine 0.7 0.5 - 1.4 mg/dL    Calcium 6.7 (LL) 8.7 - 10.5 mg/dL    Total Protein 5.6 (L) 6.0 - 8.4 g/dL    Albumin 2.1 (L) 3.5 - 5.2 g/dL    Total Bilirubin 0.2 0.1 - 1.0 mg/dL    Alkaline Phosphatase 99 55 - 135 U/L    AST 8 (L) 10 - 40 U/L    ALT 7 (L) 10 - 44 U/L    Anion Gap 11 8 - 16 mmol/L    eGFR if African American >60.0 >60 mL/min/1.73 m^2    eGFR if non African American >60.0 >60 mL/min/1.73 m^2   Protime-INR    Collection Time: 05/05/18 11:34 AM   Result Value Ref Range    Prothrombin Time 9.4 9.0 - 12.5 sec    INR 0.9 0.8 - 1.2   Troponin I    Collection Time: 05/05/18 11:34 AM   Result Value Ref Range    Troponin I 0.019 0.000 - 0.026 ng/mL   Magnesium    Collection Time: 05/05/18 11:34 AM   Result Value Ref Range    Magnesium 1.5 (L) 1.6 - 2.6 mg/dL   Beta - Hydroxybutyrate, Serum    Collection Time: 05/05/18 11:34 AM   Result Value Ref Range    Beta-Hydroxybutyrate 0.0 0.0 - 0.5 mmol/L   Hemoglobin A1c    Collection Time: 05/05/18 11:34 AM   Result Value Ref Range    Hemoglobin A1C >14.0 (H) 4.0 - 5.6 %    Estimated Avg Glucose Unable to calculate 68 - 131 mg/dL   Urinalysis, Reflex to Urine Culture Urine, Clean Catch    Collection Time: 05/05/18 11:49 AM   Result Value Ref Range    Specimen UA Urine, Clean Catch     Color, UA Straw Yellow, Straw, Batsheva    Appearance, UA Clear Clear    pH, UA 7.0 5.0 - 8.0    Specific Gravity, UA 1.025 1.005 - 1.030    Protein, UA 2+ (A) Negative    Glucose, UA 3+  (A) Negative    Ketones, UA Negative Negative    Bilirubin (UA) Negative Negative    Occult Blood UA 1+ (A) Negative    Nitrite, UA Negative Negative    Urobilinogen, UA Negative <2.0 EU/dL    Leukocytes, UA Negative Negative   Urinalysis Microscopic    Collection Time: 05/05/18 11:49 AM   Result Value Ref Range    RBC, UA 2 0 - 4 /hpf    WBC, UA 0 0 - 5 /hpf    Bacteria, UA Occasional None-Occ /hpf    Yeast, UA None None    Squam Epithel, UA 1 /hpf    Hyaline Casts, UA 0 0-1/lpf /lpf    Microscopic Comment SEE COMMENT    Drug screen panel, emergency    Collection Time: 05/05/18 11:49 AM   Result Value Ref Range    Benzodiazepines Negative     Methadone metabolites Negative     Cocaine (Metab.) Negative     Opiate Scrn, Ur Negative     Barbiturate Screen, Ur Negative     Amphetamine Screen, Ur Negative     THC Presumptive Positive     Phencyclidine Negative     Creatinine, Random Ur 25.0 15.0 - 325.0 mg/dL    Toxicology Information SEE COMMENT    Vitamin D    Collection Time: 05/05/18  8:06 PM   Result Value Ref Range    Vit D, 25-Hydroxy 4 (L) 30 - 96 ng/mL   Comprehensive metabolic panel    Collection Time: 05/05/18  8:06 PM   Result Value Ref Range    Sodium 135 (L) 136 - 145 mmol/L    Potassium 3.6 3.5 - 5.1 mmol/L    Chloride 102 95 - 110 mmol/L    CO2 24 23 - 29 mmol/L    Glucose 335 (H) 70 - 110 mg/dL    BUN, Bld 10 6 - 20 mg/dL    Creatinine 0.9 0.5 - 1.4 mg/dL    Calcium 8.8 8.7 - 10.5 mg/dL    Total Protein 7.0 6.0 - 8.4 g/dL    Albumin 2.6 (L) 3.5 - 5.2 g/dL    Total Bilirubin 0.4 0.1 - 1.0 mg/dL    Alkaline Phosphatase 115 55 - 135 U/L    AST 7 (L) 10 - 40 U/L    ALT 5 (L) 10 - 44 U/L    Anion Gap 9 8 - 16 mmol/L    eGFR if African American >60.0 >60 mL/min/1.73 m^2    eGFR if non African American >60.0 >60 mL/min/1.73 m^2   Lactic acid, plasma    Collection Time: 05/05/18  8:06 PM   Result Value Ref Range    Lactate (Lactic Acid) 2.7 (H) 0.5 - 2.2 mmol/L   Procalcitonin    Collection Time: 05/05/18   8:06 PM   Result Value Ref Range    Procalcitonin 0.06 <0.25 ng/mL   CK    Collection Time: 05/05/18  8:06 PM   Result Value Ref Range    CPK 75 20 - 180 U/L   Phosphorus    Collection Time: 05/05/18  8:06 PM   Result Value Ref Range    Phosphorus 2.0 (L) 2.7 - 4.5 mg/dL   Calcium, ionized    Collection Time: 05/05/18  8:06 PM   Result Value Ref Range    Calcium, Ion 1.18 1.06 - 1.42 mmol/L   POCT glucose    Collection Time: 05/05/18  9:09 PM   Result Value Ref Range    POCT Glucose 356 (H) 70 - 110 mg/dL   Brain natriuretic peptide    Collection Time: 05/05/18 11:40 PM   Result Value Ref Range    BNP 50 0 - 99 pg/mL   Comprehensive metabolic panel    Collection Time: 05/06/18  6:36 AM   Result Value Ref Range    Sodium 134 (L) 136 - 145 mmol/L    Potassium 3.4 (L) 3.5 - 5.1 mmol/L    Chloride 103 95 - 110 mmol/L    CO2 25 23 - 29 mmol/L    Glucose 315 (H) 70 - 110 mg/dL    BUN, Bld 9 6 - 20 mg/dL    Creatinine 0.8 0.5 - 1.4 mg/dL    Calcium 8.7 8.7 - 10.5 mg/dL    Total Protein 6.4 6.0 - 8.4 g/dL    Albumin 2.3 (L) 3.5 - 5.2 g/dL    Total Bilirubin 0.3 0.1 - 1.0 mg/dL    Alkaline Phosphatase 108 55 - 135 U/L    AST 7 (L) 10 - 40 U/L    ALT 5 (L) 10 - 44 U/L    Anion Gap 6 (L) 8 - 16 mmol/L    eGFR if African American >60.0 >60 mL/min/1.73 m^2    eGFR if non African American >60.0 >60 mL/min/1.73 m^2   Magnesium    Collection Time: 05/06/18  6:36 AM   Result Value Ref Range    Magnesium 2.0 1.6 - 2.6 mg/dL   Phosphorus    Collection Time: 05/06/18  6:36 AM   Result Value Ref Range    Phosphorus 2.1 (L) 2.7 - 4.5 mg/dL   CBC auto differential    Collection Time: 05/06/18  6:36 AM   Result Value Ref Range    WBC 7.93 3.90 - 12.70 K/uL    RBC 3.48 (L) 4.00 - 5.40 M/uL    Hemoglobin 10.8 (L) 12.0 - 16.0 g/dL    Hematocrit 32.7 (L) 37.0 - 48.5 %    MCV 94 82 - 98 fL    MCH 31.0 27.0 - 31.0 pg    MCHC 33.0 32.0 - 36.0 g/dL    RDW 12.9 11.5 - 14.5 %    Platelets 313 150 - 350 K/uL    MPV 10.0 9.2 - 12.9 fL    Immature  Granulocytes 0.3 0.0 - 0.5 %    Gran # (ANC) 4.8 1.8 - 7.7 K/uL    Immature Grans (Abs) 0.02 0.00 - 0.04 K/uL    Lymph # 2.4 1.0 - 4.8 K/uL    Mono # 0.5 0.3 - 1.0 K/uL    Eos # 0.1 0.0 - 0.5 K/uL    Baso # 0.04 0.00 - 0.20 K/uL    nRBC 0 0 /100 WBC    Gran% 60.6 38.0 - 73.0 %    Lymph% 30.5 18.0 - 48.0 %    Mono% 6.7 4.0 - 15.0 %    Eosinophil% 1.4 0.0 - 8.0 %    Basophil% 0.5 0.0 - 1.9 %    Differential Method Automated    Lactic acid, plasma    Collection Time: 05/06/18  6:36 AM   Result Value Ref Range    Lactate (Lactic Acid) 1.2 0.5 - 2.2 mmol/L   POCT glucose    Collection Time: 05/06/18  8:05 AM   Result Value Ref Range    POCT Glucose 260 (H) 70 - 110 mg/dL      No results found for: PHENYTOIN, PHENOBARB, VALPROATE, CBMZ  Imaging:  Imaging Results          CT Head Without Contrast (Final result)  Result time 05/05/18 18:07:50    Final result by Chele Kumar MD (05/05/18 18:07:50)                 Impression:      No acute intracranial process.  Please consider MRI of the brain for follow-up..    Electronically signed by resident: Yariel Noble  Date:    05/05/2018  Time:    17:39    Electronically signed by: Chele Kumar MD  Date:    05/05/2018  Time:    18:07             Narrative:    EXAMINATION:  CT HEAD WITHOUT CONTRAST    CLINICAL HISTORY:  Confusion/delirium, altered LOC, unexplained;    TECHNIQUE:  Low dose axial images were obtained through the head.  Coronal and sagittal reformations were also performed. Contrast was not administered.    COMPARISON:  None.    FINDINGS:  The ventricular system is normal in size and configuration.    Brain parenchyma demonstrates normal attenuation.  No evidence of large vascular territory infarct, intra-axial mass, or intraparenchymal hemorrhage.  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    No extra-axial mass or hemorrhage.    The calvarium appears normal.  The paranasal sinuses and mastoid air cells are essentially clear.    The extracranial soft tissues  reveal no significant abnormality.                               X-Ray Chest PA And Lateral (Final result)  Result time 05/05/18 12:28:35    Final result by Agustin Barba MD (05/05/18 12:28:35)                 Impression:      1. No acute cardiopulmonary process noting bilateral basilar atelectasis and/or scarring.      Electronically signed by: Agustin Barba MD  Date:    05/05/2018  Time:    12:28             Narrative:    EXAMINATION:  XR CHEST PA AND LATERAL    CLINICAL HISTORY:  Chest Pain;    TECHNIQUE:  PA and lateral views of the chest were performed.    COMPARISON:  03/03/2018    FINDINGS:  The cardiomediastinal silhouette is not enlarged.  There is no pleural effusion.  The trachea is midline.  The lungs are symmetrically expanded bilaterally without evidence of acute parenchymal process.  There is bandlike bilateral basilar atelectasis or scarring.  No large focal consolidation seen.  There is no pneumothorax.  The osseous structures are remarkable for degenerative changes..                                 Assessment:     Suyapa Connelly is a 52 y.o. with T2DM on insulin, HTN, HLD, GERD, PVD and history of MI, who presents to the ED with a complaint of chest pain/palpitation with associated anxiety.  Psychiatry was consulted for recommendations regarding depression, anxiety, and panic attack.    Recommendations:     MDD, moderate, recurrent, without psychotic features  Generalized Anxiety Disorder  Bereavement   · Patient does not meet PEC criteria at this time and does not require inpatient psychiatric hospitalization.  · Start Lexapro 10 mg PO daily.  May provide Vistaril 50 mg PO BID PRN anxiety.  · Patient has been counseled regarding recommendations for psychotherapy and can follow at [facility] after hospital discharge.    · Pseudoseizures are diagnoses of exclusion.  Patient will need full medical workup by Neurology prior to diagnosis.  Recommended treatment for pseudoseizures is outpatient  cognitive behavorial therapy.    Case discussed with staff psychiatrist, Dr. Tiffany MD.  Will continue to follow and monitor progression of current psychiatric condition.    Glendy Gomez MD  [facility] PGY2  [phone]

## 2018-05-07 VITALS
RESPIRATION RATE: 20 BRPM | SYSTOLIC BLOOD PRESSURE: 149 MMHG | TEMPERATURE: 99 F | HEIGHT: 66 IN | WEIGHT: 287 LBS | HEART RATE: 94 BPM | BODY MASS INDEX: 46.12 KG/M2 | OXYGEN SATURATION: 98 % | DIASTOLIC BLOOD PRESSURE: 89 MMHG

## 2018-05-07 PROBLEM — Z63.4 BEREAVEMENT: Status: ACTIVE | Noted: 2018-05-07

## 2018-05-07 PROBLEM — F41.1 GAD (GENERALIZED ANXIETY DISORDER): Chronic | Status: ACTIVE | Noted: 2018-05-07

## 2018-05-07 PROBLEM — F33.1 MDD (MAJOR DEPRESSIVE DISORDER), RECURRENT EPISODE, MODERATE: Chronic | Status: ACTIVE | Noted: 2018-05-07

## 2018-05-07 LAB
ALBUMIN SERPL BCP-MCNC: 2.4 G/DL
ALP SERPL-CCNC: 103 U/L
ALT SERPL W/O P-5'-P-CCNC: 6 U/L
ANION GAP SERPL CALC-SCNC: 7 MMOL/L
AST SERPL-CCNC: 10 U/L
BASOPHILS # BLD AUTO: 0.05 K/UL
BASOPHILS NFR BLD: 0.7 %
BILIRUB SERPL-MCNC: 0.4 MG/DL
BUN SERPL-MCNC: 10 MG/DL
CALCIUM SERPL-MCNC: 8.6 MG/DL
CHLORIDE SERPL-SCNC: 107 MMOL/L
CO2 SERPL-SCNC: 20 MMOL/L
CREAT SERPL-MCNC: 0.8 MG/DL
DIFFERENTIAL METHOD: ABNORMAL
EOSINOPHIL # BLD AUTO: 0.1 K/UL
EOSINOPHIL NFR BLD: 1.8 %
ERYTHROCYTE [DISTWIDTH] IN BLOOD BY AUTOMATED COUNT: 13.3 %
EST. GFR  (AFRICAN AMERICAN): >60 ML/MIN/1.73 M^2
EST. GFR  (NON AFRICAN AMERICAN): >60 ML/MIN/1.73 M^2
GLUCOSE SERPL-MCNC: 304 MG/DL
HCT VFR BLD AUTO: 36.1 %
HGB BLD-MCNC: 11.4 G/DL
IMM GRANULOCYTES # BLD AUTO: 0.02 K/UL
IMM GRANULOCYTES NFR BLD AUTO: 0.3 %
LYMPHOCYTES # BLD AUTO: 2.6 K/UL
LYMPHOCYTES NFR BLD: 34.9 %
MAGNESIUM SERPL-MCNC: 1.9 MG/DL
MCH RBC QN AUTO: 31 PG
MCHC RBC AUTO-ENTMCNC: 31.6 G/DL
MCV RBC AUTO: 98 FL
MONOCYTES # BLD AUTO: 0.6 K/UL
MONOCYTES NFR BLD: 8.4 %
NEUTROPHILS # BLD AUTO: 4 K/UL
NEUTROPHILS NFR BLD: 53.9 %
NRBC BLD-RTO: 0 /100 WBC
PHOSPHATE SERPL-MCNC: 2.7 MG/DL
PLATELET # BLD AUTO: 244 K/UL
PMV BLD AUTO: 10.7 FL
POCT GLUCOSE: 223 MG/DL (ref 70–110)
POCT GLUCOSE: 233 MG/DL (ref 70–110)
POCT GLUCOSE: 286 MG/DL (ref 70–110)
POCT GLUCOSE: 430 MG/DL (ref 70–110)
POTASSIUM SERPL-SCNC: 4.9 MMOL/L
PROT SERPL-MCNC: 6.4 G/DL
RBC # BLD AUTO: 3.68 M/UL
SODIUM SERPL-SCNC: 134 MMOL/L
WBC # BLD AUTO: 7.37 K/UL

## 2018-05-07 PROCEDURE — 85025 COMPLETE CBC W/AUTO DIFF WBC: CPT

## 2018-05-07 PROCEDURE — 99239 HOSP IP/OBS DSCHRG MGMT >30: CPT | Mod: ,,, | Performed by: HOSPITALIST

## 2018-05-07 PROCEDURE — 83735 ASSAY OF MAGNESIUM: CPT

## 2018-05-07 PROCEDURE — G0378 HOSPITAL OBSERVATION PER HR: HCPCS

## 2018-05-07 PROCEDURE — 36415 COLL VENOUS BLD VENIPUNCTURE: CPT

## 2018-05-07 PROCEDURE — 99232 SBSQ HOSP IP/OBS MODERATE 35: CPT | Mod: AF,HB,, | Performed by: PSYCHIATRY & NEUROLOGY

## 2018-05-07 PROCEDURE — 80053 COMPREHEN METABOLIC PANEL: CPT

## 2018-05-07 PROCEDURE — 25000003 PHARM REV CODE 250: Performed by: HOSPITALIST

## 2018-05-07 PROCEDURE — 63600175 PHARM REV CODE 636 W HCPCS: Performed by: HOSPITALIST

## 2018-05-07 PROCEDURE — 84100 ASSAY OF PHOSPHORUS: CPT

## 2018-05-07 RX ORDER — GABAPENTIN 300 MG/1
300 CAPSULE ORAL 3 TIMES DAILY
Qty: 90 CAPSULE | Refills: 5 | Status: SHIPPED | OUTPATIENT
Start: 2018-05-07 | End: 2021-01-20

## 2018-05-07 RX ORDER — INSULIN ASPART 100 [IU]/ML
9 INJECTION, SOLUTION INTRAVENOUS; SUBCUTANEOUS
Status: DISCONTINUED | OUTPATIENT
Start: 2018-05-07 | End: 2018-05-07 | Stop reason: HOSPADM

## 2018-05-07 RX ORDER — PEN NEEDLE, DIABETIC 29 G X1/2"
1 NEEDLE, DISPOSABLE MISCELLANEOUS 2 TIMES DAILY WITH MEALS
Qty: 100 EACH | Refills: 11 | Status: ON HOLD | COMMUNITY
Start: 2018-05-07 | End: 2019-12-09 | Stop reason: SDUPTHER

## 2018-05-07 RX ORDER — LISINOPRIL 40 MG/1
40 TABLET ORAL DAILY
Qty: 30 TABLET | Refills: 5 | Status: ON HOLD | OUTPATIENT
Start: 2018-05-07 | End: 2019-05-07

## 2018-05-07 RX ORDER — CLOPIDOGREL BISULFATE 75 MG/1
75 TABLET ORAL DAILY
Qty: 30 TABLET | Refills: 5 | Status: ON HOLD | OUTPATIENT
Start: 2018-05-07 | End: 2018-11-03

## 2018-05-07 RX ORDER — INSULIN ASPART 100 [IU]/ML
9 INJECTION, SOLUTION INTRAVENOUS; SUBCUTANEOUS
Qty: 10 ML | Refills: 0 | Status: SHIPPED | OUTPATIENT
Start: 2018-05-07 | End: 2018-08-15

## 2018-05-07 RX ORDER — FAMOTIDINE 20 MG/1
20 TABLET, FILM COATED ORAL 2 TIMES DAILY
Qty: 60 TABLET | Refills: 4 | Status: ON HOLD | OUTPATIENT
Start: 2018-05-07 | End: 2019-05-07

## 2018-05-07 RX ORDER — ATORVASTATIN CALCIUM 40 MG/1
40 TABLET, FILM COATED ORAL DAILY
Qty: 30 TABLET | Refills: 5 | Status: ON HOLD | OUTPATIENT
Start: 2018-05-07 | End: 2019-05-07

## 2018-05-07 RX ORDER — HYDROXYZINE PAMOATE 50 MG/1
50 CAPSULE ORAL EVERY 6 HOURS PRN
Qty: 40 CAPSULE | Refills: 0 | Status: ON HOLD | OUTPATIENT
Start: 2018-05-07 | End: 2019-12-09 | Stop reason: SDUPTHER

## 2018-05-07 RX ORDER — INSULIN GLARGINE 100 [IU]/ML
50 INJECTION, SOLUTION SUBCUTANEOUS NIGHTLY
Qty: 15 ML | Refills: 0 | Status: SHIPPED | OUTPATIENT
Start: 2018-05-07 | End: 2018-08-15

## 2018-05-07 RX ORDER — CARVEDILOL 6.25 MG/1
6.25 TABLET ORAL 2 TIMES DAILY WITH MEALS
Qty: 60 TABLET | Refills: 4 | Status: ON HOLD | OUTPATIENT
Start: 2018-05-07 | End: 2019-05-07

## 2018-05-07 RX ORDER — PEN NEEDLE, DIABETIC 30 GX3/16"
1 NEEDLE, DISPOSABLE MISCELLANEOUS 2 TIMES DAILY WITH MEALS
Qty: 100 EACH | Refills: 11 | Status: CANCELLED | OUTPATIENT
Start: 2018-05-07

## 2018-05-07 RX ORDER — LANCING DEVICE
1 EACH MISCELLANEOUS 2 TIMES DAILY WITH MEALS
Qty: 1 EACH | Refills: 0 | Status: SHIPPED | OUTPATIENT
Start: 2018-05-07 | End: 2021-02-24 | Stop reason: SDUPTHER

## 2018-05-07 RX ORDER — ESCITALOPRAM OXALATE 10 MG/1
10 TABLET ORAL DAILY
Qty: 30 TABLET | Refills: 4 | Status: ON HOLD | OUTPATIENT
Start: 2018-05-08 | End: 2019-05-08

## 2018-05-07 RX ORDER — BLOOD-GLUCOSE CONTROL, NORMAL
1 EACH MISCELLANEOUS 2 TIMES DAILY WITH MEALS
Qty: 100 EACH | Refills: 11 | Status: ON HOLD | OUTPATIENT
Start: 2018-05-07 | End: 2019-12-09 | Stop reason: SDUPTHER

## 2018-05-07 RX ORDER — PEN NEEDLE, DIABETIC 29 G X1/2"
1 NEEDLE, DISPOSABLE MISCELLANEOUS 2 TIMES DAILY WITH MEALS
Qty: 100 EACH | Refills: 11 | Status: CANCELLED | OUTPATIENT
Start: 2018-05-07

## 2018-05-07 RX ORDER — ERGOCALCIFEROL 1.25 MG/1
50000 CAPSULE ORAL
Qty: 4 CAPSULE | Refills: 0 | Status: SHIPPED | OUTPATIENT
Start: 2018-05-13 | End: 2018-06-12

## 2018-05-07 RX ORDER — PEN NEEDLE, DIABETIC 30 GX3/16"
1 NEEDLE, DISPOSABLE MISCELLANEOUS 2 TIMES DAILY WITH MEALS
Qty: 100 EACH | Refills: 11 | Status: ON HOLD | COMMUNITY
Start: 2018-05-07 | End: 2019-12-09 | Stop reason: SDUPTHER

## 2018-05-07 RX ORDER — DEXTROSE 4 G
TABLET,CHEWABLE ORAL
Qty: 1 EACH | Refills: 0 | Status: ON HOLD | OUTPATIENT
Start: 2018-05-07 | End: 2020-01-03

## 2018-05-07 RX ADMIN — ATORVASTATIN CALCIUM 40 MG: 20 TABLET, FILM COATED ORAL at 09:05

## 2018-05-07 RX ADMIN — GABAPENTIN 300 MG: 100 CAPSULE ORAL at 09:05

## 2018-05-07 RX ADMIN — SODIUM CHLORIDE AND POTASSIUM CHLORIDE: 9; 1.49 INJECTION, SOLUTION INTRAVENOUS at 05:05

## 2018-05-07 RX ADMIN — INSULIN ASPART 9 UNITS: 100 INJECTION, SOLUTION INTRAVENOUS; SUBCUTANEOUS at 12:05

## 2018-05-07 RX ADMIN — GABAPENTIN 300 MG: 100 CAPSULE ORAL at 01:05

## 2018-05-07 RX ADMIN — ESCITALOPRAM OXALATE 10 MG: 10 TABLET ORAL at 09:05

## 2018-05-07 RX ADMIN — ASPIRIN 325 MG ORAL TABLET 325 MG: 325 PILL ORAL at 09:05

## 2018-05-07 RX ADMIN — FAMOTIDINE 20 MG: 20 TABLET, FILM COATED ORAL at 09:05

## 2018-05-07 RX ADMIN — INSULIN ASPART 6 UNITS: 100 INJECTION, SOLUTION INTRAVENOUS; SUBCUTANEOUS at 09:05

## 2018-05-07 RX ADMIN — CARVEDILOL 6.25 MG: 3.12 TABLET, FILM COATED ORAL at 09:05

## 2018-05-07 RX ADMIN — LISINOPRIL 40 MG: 20 TABLET ORAL at 09:05

## 2018-05-07 NOTE — PROGRESS NOTES
"[facility] Hospital Medicine  Progress Note    Patient Name: Suyapa Connelly  MRN: [id number]  Patient Class: IP- Inpatient   Admission Date: 2018  Length of Stay: 2 days  Attending Physician: Rosales Barton MD  Primary Care Provider: Erlinda Rahman NP    [facility] Medicine Team: [facility] Rosales Barton MD    Subjective:     Principal Problem:Acute encephalopathy    HPI:  This is a 52 y.o. female with T2DM on insulin, HTN, HLD, GERD, PVD and history of MI, who presents to the ED with a complaint of chest pain/palpitation with associated anxiety. She report daily to 2x a day episode of feeling of impending doom over the last 3 weeks, episode would last 10-15 mins, where she report shortness of breath, extreme anxiety, fear and palpitations. She report feeling frozen during these episodes, she is aware of her surrounding, denies LOC, denies urinary/bowel incontinence or tongue biting.  who are present during these episode denies seizure like activities, no falls, no LOC as well. She feel that she is slowly "losing my mind". She feels "like something bad is about to happen and she does not have any control over it", and feels "like she is standing outside of her body and can see herself looking around". She endorses some chest pain over the past few days, a dry cough, swelling in her ankles, chills, polydipsia. She report that on 3/31 her nephew  and that she feels bad/guilty that she could not help him. Both her and her  thinks that this might have been the stressor that lead to these episodes. Pt also reports difficulty focusing, difficulty balancing, loss of appetite (food tastes like cardboard), chronic nausea, and chronic numbness in her legs, chronic cramps in her calves. She denies SOB, numbness in her arms, vomiting, fevers, abdominal pain, dysuria, frequent urination, abnormal bowel movements, and back pain. She had an NSTEMI in , and today's complaints " "feel different than her symptoms at that time. She has stents in her legs, report that she lost insurance and was unable to continue Plavix or Gabapentin. She has been taking all of her medications. For DM, she has been taking 50 mg Lantus and 500 mg Metformin. On a "bad day" her blood sugar can be around 250. Pt denies hx of anxiety or depression, not on any psych meds. But report that her mother had 2 "nervous breakdown" episodes. Denies hx of seizure.     In ED, pt was found to be hyperglycemic, hypocalcemic and hypokalemic. Hypertension noted as well.  She also had one of these episode, per nursing pt was just staring at the wall but no seizure activity, pt was aware that she was staring at the wall but she was "frozen". CT head ordered per ED team. Return to baseline on hospital medicine evaluation.        Hospital Course:  Admitted on 5/5 for MDD, generalized anxiety and chest pain/palpitation episodes. Found to have uncontrolled DM as she was unable to get insulin due to insurance coverage. Work up of these episode has r/o infectious, metabolic causes. Neuro work up so far has been negative for EEG started, brain imaging were negative. Psych saw patient, started her on Lexapro 10mg    Interval History: Doing well today, has no complains. Patient reports feeling "better." Received first dose Lexapro yesterday, denies AEs. Per chart received PRN Vistaril x1 yesterday for anxiety, patient does not recall taking      Review of Systems   Constitutional:  Negative for fever.   HENT: Negative for sore throat.    Respiratory:  Negative for shortness of breath.    Cardiology: Negative for chest pain  Gastrointestinal: Negative for abdominal pain, constipation, diarrhea and vomiting.   Neurological: Negative for weakness. Positive for chronic numbness in her legs   Hematological: Does not bruise/bleed easily.   Psychiatric/Behavioral: Positive for decreased concentration. The patient is nervous/anxious.      Objective: "     Vital Signs (Most Recent):  Temp: 98.7 °F (37.1 °C) (05/07/18 1000)  Pulse: 90 (05/07/18 1054)  Resp: 19 (05/07/18 0730)  BP: (!) 147/87 (05/07/18 0730)  SpO2: 98 % (05/07/18 0730) Vital Signs (24h Range):  Temp:  [98 °F (36.7 °C)-98.7 °F (37.1 °C)] 98.7 °F (37.1 °C)  Pulse:  [] 90  Resp:  [16-24] 19  SpO2:  [96 %-99 %] 98 %  BP: (146-174)/() 147/87     Weight: 130.2 kg (287 lb)  Body mass index is 47.03 kg/m².    Intake/Output Summary (Last 24 hours) at 05/07/18 1433  Last data filed at 05/07/18 0515   Gross per 24 hour   Intake          2430.42 ml   Output                0 ml   Net          2430.42 ml        Physical Exam  Nursing note and vitals reviewed.  Constitutional: She appears well-developed and well-nourished. No distress.   Cardiovascular: Regular rhythm and normal heart sounds.  Exam reveals no gallop and no friction rub.    No murmur heard.  Pulmonary/Chest: Breath sounds normal. No respiratory distress. She has no wheezes. She has no rales.   Abdominal: Soft. Bowel sounds are normal. She exhibits no distension. There is no tenderness.   Musculoskeletal: She exhibits chronic LE edema (bilat) or tenderness.   Neurological: She is alert and oriented to person, place, and time. She has normal strength. No cranial nerve deficit or sensory deficit.   Skin: Skin is warm and dry. No rash noted. No pallor.   Psychiatric: She has a normal mood and affect.     MELD-Na score: 6 at 5/7/2018  6:46 AM  MELD score: 6 at 5/7/2018  6:46 AM  Calculated from:  Serum Creatinine: 0.8 mg/dL (Rounded to 1) at 5/7/2018  6:46 AM  Serum Sodium: 134 mmol/L at 5/7/2018  6:46 AM  Total Bilirubin: 0.4 mg/dL (Rounded to 1) at 5/7/2018  6:46 AM  INR(ratio): 0.9 (Rounded to 1) at 5/5/2018 11:34 AM  Age: 52 years    Significant Labs:  CBC:    Recent Labs  Lab 05/06/18 0636 05/07/18  0646   WBC 7.93 7.37   HGB 10.8* 11.4*   HCT 32.7* 36.1*    244     CMP:    Recent Labs  Lab 05/05/18 2006 05/06/18  0636  05/07/18  0646   * 134* 134*   K 3.6 3.4* 4.9    103 107   CO2 24 25 20*   * 315* 304*   BUN 10 9 10   CREATININE 0.9 0.8 0.8   CALCIUM 8.8 8.7 8.6*   PROT 7.0 6.4 6.4   ALBUMIN 2.6* 2.3* 2.4*   BILITOT 0.4 0.3 0.4   ALKPHOS 115 108 103   AST 7* 7* 10   ALT 5* 5* 6*   ANIONGAP 9 6* 7*   EGFRNONAA >60.0 >60.0 >60.0     PTINR:  No results for input(s): INR in the last 48 hours.    Significant Procedures:   Dobutamine Stress Test with Color Flow: No results found for this or any previous visit.    None    Assessment/Plan:      * Panic attack as reaction to stress    MDD, moderate, recurrent, without psychotic features  Generalized Anxiety Disorder  Bereavement  - recent nephew passing away on 3/31, now with symptoms similar to panic attack episode  - Per psych, Start Lexapro 10 mg PO daily.  Vistaril 50 mg PO BID PRN anxiety.  - Outpatient psychotherapy and can follow at [facility] after hospital discharge.    - Pt to set up on D/C        Acute encephalopathy    - Episodic of shortness of breath, extreme anxiety, fear and palpitations, report being frozen during these episodes, she is aware of her surrounding, denies LOC, denies urinary/bowel incontinence or tongue biting.   - CT head/ and MRI brain: negative for structural issue, she is getting EEG 5/6 to r/o neurological cause ie seizure  - Fixed metabolic cause (hyperglycemia, hypocalcemia, hypokalemia)  - UA and CXR negative for infectious causes  - TSH wnl  - Psych consult to help evaluate for panic attack as cause given presentation and recent major stressor, Lexapro started 10mg QD, PRN vistaril  - suspect it to be anxiety driven at this time          Type 2 diabetes mellitus without complication, with long-term current use of insulin    - hyperglycemia in ED, glucose at 392, not in DKA given neg ketones in urine and negative beta-hydroxybutyrate  - A1C is >14, which means estimate baseline glucose for patient is ~357  - Reported on Lantus 50U QHS  and metformin 500mg BID, (pt report not consistently taking meds due to insurance issue)  - 9U Novolog with meal continue 50U of Levemir and monitor  - POCT glucose check ACHS  - Metabolic acidosis resolved  - D/C home with Novolog vials (she has used it in the past) and Lantus 50U QHS  - Diabetes education referral placed, testing strips, glucometer and supplies ordered  - Pt has PCP follow up soon for refills of meds          Vitamin D deficiency    - supplementation started  - will need outpatient monitoring on D/C          Essential hypertension    - she reported started taking  20mg of lisinopril as she has been out  - increase to 40mg QD for now          Malnutrition of moderate degree    - albumin at 2  - nutrition consulted to educate pt on Diabetic diet          Peripheral vascular disease    - Report chronic LE swelling  - CTM, can consider lasix if needed          Peripheral artery disease    - report having stents placed in her legs with chronic LE swelling  - Continue ASA  - Pt reported stop taking Plavix and statin after losing insurance, will restart plavix and add atorvastatin  - LE neuropathy?: restart gabapentin            VTE Risk Mitigation         Ordered     enoxaparin injection 40 mg  Daily      05/05/18 1755     IP VTE HIGH RISK PATIENT  Once      05/05/18 1755     Place HAILEY hose  Until discontinued      05/05/18 1426     Place sequential compression device  Until discontinued      05/05/18 1426          D/C home    Rosales Barton MD  Department of Hospital Medicine   [facility]

## 2018-05-07 NOTE — ASSESSMENT & PLAN NOTE
· Continue Lexapro 10 mg PO daily  · Continue Vistaril 50 mg PO BID PRN anxiety.  · Patient has been counseled regarding recommendations for psychotherapy and can follow at [facility] after hospital discharge.

## 2018-05-07 NOTE — PROGRESS NOTES
"[facility]  Psychiatry  Progress Note    Patient Name: Suyapa Connelly  MRN: [id number]   Code Status: Full Code  Admission Date: 5/5/2018  Hospital Length of Stay: 2 days  Expected Discharge Date: 5/8/2018  Attending Physician: Rosales Barton MD  Primary Care Provider: Erlinda Rahman NP    Current Legal Status: N/A    Patient information was obtained from patient, spouse/SO, past medical records and ER records.     Subjective:     Principal Problem:Acute encephalopathy    Chief Complaint:     HPI:   This is a 52 y.o. female with T2DM on insulin, HTN, HLD, GERD, PVD and history of MI, who presents to the ED with a complaint of chest pain/palpitation with associated anxiety.  She was admitted for workup of episodes of shortness of breath, extreme anxiety, fear and palpitations. She reported feeling frozen during these episodes, she is aware of her surrounding, denies LOC, denies urinary/bowel incontinence or tongue biting.  was present during these episode denies seizure like activities, no falls, no LOC as well.  EEG has been ordered as well as head imaging.  Psychiatry was consulted for recommendations for medication management of panic attacks.     Patient was interviewed alone and with her  at bedside. Patient reports that she has struggled with depression and anxiety for several years, since [place], but never sought professional help.  She lost her nephew suddenly last month, and since that time has had "episodes" of increased frequency as described above.  She feels helpless and out of control during these episodes.  During the evaluation, patient experienced an episode where she hyperventilated, tilted her head back, and was unable to answer questions.  Her  held her and reassured her in a calm voice.  Patient did not lose consciousness or bowel or bladder function.  The episode lasted 2-3 minutes, and afterward patient had no confusion and was able to resume the " interview.  She voiced feelings of guilt regarding her nephews death, anxiety regarding finances, family, and health, and poor appetite, difficulty falling asleep, and anhedonia.  She denied ever having suicidal thoughts or intentions.  Patient provided informed consent for an SSRI and was provided reassurance regarding the possibility for improvement of her symptoms.  She is open to seeking psychotherapy       Collateral: , spoke to at bedside  Patient's  corroborated patient's history regarding her anxiety and panic since her nephew's death.  He has spent more time caring for patient in the last month and describes her previously as high functioning and self sufficient.  He feels her grief is contributing to her current condition and confesses the two have spoken infrequently about her nephew.  Patient does not like to be alone recently and relies on  for comfort.  He describes these episodes has happening when medical staff enter the room and when the two are home alone.  He does not feel the patient is in imminent danger and is agreeable to plan of starting SSRI with follow up psychotherapy.      Medical Review Of Systems:  Pertinent items are noted in HPI.     Psychiatric Review Of Systems - Is patient experiencing or having changes in:  sleep: yes  appetite: yes  weight: no  energy/anergy: yes  interest/pleasure/anhedonia: yes  somatic symptoms: no  libido: no  anxiety/panic: yes  guilty/hopelessness: yes  concentration: yes  S.I.B.s/risky behavior: no  any drugs: occasional marijuana use  alcohol: no      Allergies:  Pcn [penicillins]     Past Medical/Surgical History       Past Medical History:   Diagnosis Date    Diabetes mellitus       type 2    GERD (gastroesophageal reflux disease)      Hyperlipemia      Hypertension      Myocardial infarction 2010     minor-caused by stress per pt.       has a past medical history of Diabetes mellitus; GERD (gastroesophageal reflux disease);  "Hyperlipemia; Hypertension; and Myocardial infarction (2010).        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  Education: high school diploma/GED  Special Ed: no  Housing Status: with  in [facility]  History of phys/sexual abuse: no  Access to gun: no     Substance Abuse History:  Recreational Drugs: marijuana  Use of Alcohol: occasional, social use  Rehab History:no   Tobacco Use:no  Use of Caffeine: denies use  Use of OTC: denied  Legal consequences of chemical use: no     Legal History:  Past Charges/Incarcerations:no   Pending charges:no      Family Psychiatric History:   Mother - "nervous breakdowns"      Hospital Course: 05/07/2018: PEE, patient reports feeling "better." Received first dose Lexapro yesterday, denies AEs. Per chart received PRN Vistaril x1 yesterday for anxiety, patient does not recall taking. She reports feeling "mentally tired" after "episodes" and  reports yesterday she had an attack in which she became "stiff as a board" for the first time and that this was witnessed by RN. No documentation of episode in chart.       Interval History: see hospital course    Family History     None        Social History Main Topics    Smoking status: Never Smoker    Smokeless tobacco: Never Used    Alcohol use No    Drug use: Yes     Types: Marijuana      Comment: used yesterday    Sexual activity: Not on file     Psychotherapeutics     Start     Stop Route Frequency Ordered    05/06/18 1200  escitalopram oxalate tablet 10 mg      -- Oral Daily 05/06/18 1055    05/05/18 9246  " "ramelteon tablet 8 mg      -- Oral Nightly PRN 05/05/18 1755           Review of Systems  Objective:     Vital Signs (Most Recent):  Temp: 98 °F (36.7 °C) (05/07/18 0200)  Pulse: 88 (05/07/18 0730)  Resp: 19 (05/07/18 0730)  BP: (!) 147/87 (05/07/18 0730)  SpO2: 98 % (05/07/18 0730) Vital Signs (24h Range):  Temp:  [98 °F (36.7 °C)-98.6 °F (37 °C)] 98 °F (36.7 °C)  Pulse:  [] 88  Resp:  [16-24] 19  SpO2:  [96 %-99 %] 98 %  BP: (146-174)/() 147/87     Height: 5' 5.5" (166.4 cm)  Weight: 130.2 kg (287 lb)  Body mass index is 47.03 kg/m².      Intake/Output Summary (Last 24 hours) at 05/07/18 1011  Last data filed at 05/07/18 0515   Gross per 24 hour   Intake          2430.42 ml   Output                0 ml   Net          2430.42 ml       Physical Exam    Mental Status Exam:  Appearance: unremarkable, age appropriate, lying in bed   Behavior: friendly and cooperative   Speech/Language: normal tone, normal rate, normal pitch, normal volume   Mood: "better"   Affect: mood congruent   Thought Process:  normal and logical   Thought Content: normal, no suicidality, no homicidality, delusions, or paranoia   Orientation: grossly intact   Cognition: grossly intact   Insight: fair   Judgment: fair       Significant Labs:   Last 24 Hours:   Recent Lab Results       05/07/18  0646 05/06/18  2102 05/06/18  1720      Immature Granulocytes 0.3       Immature Grans (Abs) 0.02  Comment:  Mild elevation in immature granulocytes is non specific and   can be seen in a variety of conditions including stress response,   acute inflammation, trauma and pregnancy. Correlation with other   laboratory and clinical findings is essential.         Albumin 2.4(L)       Alkaline Phosphatase 103       ALT 6(L)       Anion Gap 7(L)       AST 10       Baso # 0.05       Basophil% 0.7       Total Bilirubin 0.4  Comment:  For infants and newborns, interpretation of results should be based  on gestational age, weight and in agreement with " clinical  observations.  Premature Infant recommended reference ranges:  Up to 24 hours.............<8.0 mg/dL  Up to 48 hours............<12.0 mg/dL  3-5 days..................<15.0 mg/dL  6-29 days.................<15.0 mg/dL         BUN, Bld 10       Calcium 8.6(L)       Chloride 107       CO2 20(L)       Creatinine 0.8       Differential Method Automated       eGFR if  >60.0       eGFR if non  >60.0  Comment:  Calculation used to obtain the estimated glomerular filtration  rate (eGFR) is the CKD-EPI equation.          Eos # 0.1       Eosinophil% 1.8       Glucose 304(H)       Gran # (ANC) 4.0       Gran% 53.9       Hematocrit 36.1(L)       Hemoglobin 11.4(L)       Lymph # 2.6       Lymph% 34.9       Magnesium 1.9       MCH 31.0       MCHC 31.6(L)       MCV 98       Mono # 0.6       Mono% 8.4       MPV 10.7       nRBC 0       Phosphorus 2.7       Platelets 244       POCT Glucose  252(H) 357(H)     Potassium 4.9       Total Protein 6.4       RBC 3.68(L)       RDW 13.3       Sodium 134(L)       WBC 7.37             Significant Imaging: I have reviewed all pertinent imaging results/findings within the past 24 hours.    Assessment/Plan:     Bereavement    See plan under MDD        MDD (major depressive disorder), recurrent episode, moderate    · Continue Lexapro 10 mg PO daily  · Continue Vistaril 50 mg PO BID PRN anxiety.  · Patient has been counseled regarding recommendations for psychotherapy and can follow at [facility] after hospital discharge.          CAROLIN (generalized anxiety disorder)    See plan for MDD         Psychiatry will sign off at this time, please contact CL team with any further questions regarding this patient.     Need for Continued Hospitalization:   No need for inpatient psychiatric hospitalization. Continue medical care as per the primary team.    Anticipated Disposition: Home or Self Care     Total time:  25 with greater than 50% of this time spent in counseling  and/or coordination of care.       Bárbara Fisher MD   Psychiatry  [facility]

## 2018-05-07 NOTE — ASSESSMENT & PLAN NOTE
MDD, moderate, recurrent, without psychotic features  Generalized Anxiety Disorder  Bereavement  - recent nephew passing away on 3/31, now with symptoms similar to panic attack episode  - Per psych, Start Lexapro 10 mg PO daily.  Vistaril 50 mg PO BID PRN anxiety.  - Outpatient psychotherapy and can follow at [facility] after hospital discharge.    - Pt to set up on D/C

## 2018-05-07 NOTE — SUBJECTIVE & OBJECTIVE
"Interval History: Doing well today, has no complains. Patient reports feeling "better." Received first dose Lexapro yesterday, denies AEs. Per chart received PRN Vistaril x1 yesterday for anxiety, patient does not recall taking      Review of Systems   Constitutional:  Negative for fever.   HENT: Negative for sore throat.    Respiratory:  Negative for shortness of breath.    Cardiology: Negative for chest pain  Gastrointestinal: Negative for abdominal pain, constipation, diarrhea and vomiting.   Neurological: Negative for weakness. Positive for chronic numbness in her legs   Hematological: Does not bruise/bleed easily.   Psychiatric/Behavioral: Positive for decreased concentration. The patient is nervous/anxious.      Objective:     Vital Signs (Most Recent):  Temp: 98.7 °F (37.1 °C) (05/07/18 1000)  Pulse: 90 (05/07/18 1054)  Resp: 19 (05/07/18 0730)  BP: (!) 147/87 (05/07/18 0730)  SpO2: 98 % (05/07/18 0730) Vital Signs (24h Range):  Temp:  [98 °F (36.7 °C)-98.7 °F (37.1 °C)] 98.7 °F (37.1 °C)  Pulse:  [] 90  Resp:  [16-24] 19  SpO2:  [96 %-99 %] 98 %  BP: (146-174)/() 147/87     Weight: 130.2 kg (287 lb)  Body mass index is 47.03 kg/m².    Intake/Output Summary (Last 24 hours) at 05/07/18 1433  Last data filed at 05/07/18 0515   Gross per 24 hour   Intake          2430.42 ml   Output                0 ml   Net          2430.42 ml        Physical Exam  Nursing note and vitals reviewed.  Constitutional: She appears well-developed and well-nourished. No distress.   Cardiovascular: Regular rhythm and normal heart sounds.  Exam reveals no gallop and no friction rub.    No murmur heard.  Pulmonary/Chest: Breath sounds normal. No respiratory distress. She has no wheezes. She has no rales.   Abdominal: Soft. Bowel sounds are normal. She exhibits no distension. There is no tenderness.   Musculoskeletal: She exhibits chronic LE edema (bilat) or tenderness.   Neurological: She is alert and oriented to person, " place, and time. She has normal strength. No cranial nerve deficit or sensory deficit.   Skin: Skin is warm and dry. No rash noted. No pallor.   Psychiatric: She has a normal mood and affect.     MELD-Na score: 6 at 5/7/2018  6:46 AM  MELD score: 6 at 5/7/2018  6:46 AM  Calculated from:  Serum Creatinine: 0.8 mg/dL (Rounded to 1) at 5/7/2018  6:46 AM  Serum Sodium: 134 mmol/L at 5/7/2018  6:46 AM  Total Bilirubin: 0.4 mg/dL (Rounded to 1) at 5/7/2018  6:46 AM  INR(ratio): 0.9 (Rounded to 1) at 5/5/2018 11:34 AM  Age: 52 years    Significant Labs:  CBC:    Recent Labs  Lab 05/06/18  0636 05/07/18  0646   WBC 7.93 7.37   HGB 10.8* 11.4*   HCT 32.7* 36.1*    244     CMP:    Recent Labs  Lab 05/05/18 2006 05/06/18  0636 05/07/18  0646   * 134* 134*   K 3.6 3.4* 4.9    103 107   CO2 24 25 20*   * 315* 304*   BUN 10 9 10   CREATININE 0.9 0.8 0.8   CALCIUM 8.8 8.7 8.6*   PROT 7.0 6.4 6.4   ALBUMIN 2.6* 2.3* 2.4*   BILITOT 0.4 0.3 0.4   ALKPHOS 115 108 103   AST 7* 7* 10   ALT 5* 5* 6*   ANIONGAP 9 6* 7*   EGFRNONAA >60.0 >60.0 >60.0     PTINR:  No results for input(s): INR in the last 48 hours.    Significant Procedures:   Dobutamine Stress Test with Color Flow: No results found for this or any previous visit.    None

## 2018-05-07 NOTE — SUBJECTIVE & OBJECTIVE
"Interval History: see hospital course    Family History     None        Social History Main Topics    Smoking status: Never Smoker    Smokeless tobacco: Never Used    Alcohol use No    Drug use: Yes     Types: Marijuana      Comment: used yesterday    Sexual activity: Not on file     Psychotherapeutics     Start     Stop Route Frequency Ordered    05/06/18 1200  escitalopram oxalate tablet 10 mg      -- Oral Daily 05/06/18 1055    05/05/18 1854  ramelteon tablet 8 mg      -- Oral Nightly PRN 05/05/18 1755           Review of Systems  Objective:     Vital Signs (Most Recent):  Temp: 98 °F (36.7 °C) (05/07/18 0200)  Pulse: 88 (05/07/18 0730)  Resp: 19 (05/07/18 0730)  BP: (!) 147/87 (05/07/18 0730)  SpO2: 98 % (05/07/18 0730) Vital Signs (24h Range):  Temp:  [98 °F (36.7 °C)-98.6 °F (37 °C)] 98 °F (36.7 °C)  Pulse:  [] 88  Resp:  [16-24] 19  SpO2:  [96 %-99 %] 98 %  BP: (146-174)/() 147/87     Height: 5' 5.5" (166.4 cm)  Weight: 130.2 kg (287 lb)  Body mass index is 47.03 kg/m².      Intake/Output Summary (Last 24 hours) at 05/07/18 1011  Last data filed at 05/07/18 0515   Gross per 24 hour   Intake          2430.42 ml   Output                0 ml   Net          2430.42 ml       Physical Exam    Mental Status Exam:  Appearance: unremarkable, age appropriate, lying in bed   Behavior: friendly and cooperative   Speech/Language: normal tone, normal rate, normal pitch, normal volume   Mood: "better"   Affect: mood congruent   Thought Process:  normal and logical   Thought Content: normal, no suicidality, no homicidality, delusions, or paranoia   Orientation: grossly intact   Cognition: grossly intact   Insight: fair   Judgment: fair       Significant Labs:   Last 24 Hours:   Recent Lab Results       05/07/18  0646 05/06/18  2102 05/06/18  1720      Immature Granulocytes 0.3       Immature Grans (Abs) 0.02  Comment:  Mild elevation in immature granulocytes is non specific and   can be seen in a variety of " conditions including stress response,   acute inflammation, trauma and pregnancy. Correlation with other   laboratory and clinical findings is essential.         Albumin 2.4(L)       Alkaline Phosphatase 103       ALT 6(L)       Anion Gap 7(L)       AST 10       Baso # 0.05       Basophil% 0.7       Total Bilirubin 0.4  Comment:  For infants and newborns, interpretation of results should be based  on gestational age, weight and in agreement with clinical  observations.  Premature Infant recommended reference ranges:  Up to 24 hours.............<8.0 mg/dL  Up to 48 hours............<12.0 mg/dL  3-5 days..................<15.0 mg/dL  6-29 days.................<15.0 mg/dL         BUN, Bld 10       Calcium 8.6(L)       Chloride 107       CO2 20(L)       Creatinine 0.8       Differential Method Automated       eGFR if  >60.0       eGFR if non  >60.0  Comment:  Calculation used to obtain the estimated glomerular filtration  rate (eGFR) is the CKD-EPI equation.          Eos # 0.1       Eosinophil% 1.8       Glucose 304(H)       Gran # (ANC) 4.0       Gran% 53.9       Hematocrit 36.1(L)       Hemoglobin 11.4(L)       Lymph # 2.6       Lymph% 34.9       Magnesium 1.9       MCH 31.0       MCHC 31.6(L)       MCV 98       Mono # 0.6       Mono% 8.4       MPV 10.7       nRBC 0       Phosphorus 2.7       Platelets 244       POCT Glucose  252(H) 357(H)     Potassium 4.9       Total Protein 6.4       RBC 3.68(L)       RDW 13.3       Sodium 134(L)       WBC 7.37             Significant Imaging: I have reviewed all pertinent imaging results/findings within the past 24 hours.

## 2018-05-07 NOTE — ASSESSMENT & PLAN NOTE
- Episodic of shortness of breath, extreme anxiety, fear and palpitations, report being frozen during these episodes, she is aware of her surrounding, denies LOC, denies urinary/bowel incontinence or tongue biting.   - CT head/ and MRI brain: negative for structural issue, she is getting EEG 5/6 to r/o neurological cause ie seizure  - Fixed metabolic cause (hyperglycemia, hypocalcemia, hypokalemia)  - UA and CXR negative for infectious causes  - TSH wnl  - Psych consult to help evaluate for panic attack as cause given presentation and recent major stressor, Lexapro started 10mg QD, PRN vistaril  - suspect it to be anxiety driven at this time

## 2018-05-07 NOTE — ASSESSMENT & PLAN NOTE
- hyperglycemia in ED, glucose at 392, not in DKA given neg ketones in urine and negative beta-hydroxybutyrate  - A1C is >14, which means estimate baseline glucose for patient is ~357  - Reported on Lantus 50U QHS and metformin 500mg BID, (pt report not consistently taking meds due to insurance issue)  - 9U Novolog with meal continue 50U of Levemir and monitor  - POCT glucose check ACHS  - Metabolic acidosis resolved  - D/C home with Novolog vials (she has used it in the past) and Lantus 50U QHS  - Diabetes education referral placed, testing strips, glucometer and supplies ordered  - Pt has PCP follow up soon for refills of meds

## 2018-05-07 NOTE — PLAN OF CARE
No future appointments.  See previous note and avs     05/07/18 1139   Discharge Assessment   Assessment Type Discharge Planning Assessment   Confirmed/corrected address and phone number on facesheet? Yes   Assessment information obtained from? Patient;Caregiver   Expected Length of Stay (days) 3   Communicated expected length of stay with patient/caregiver yes   Prior to hospitilization cognitive status: Alert/Oriented   Current cognitive status: Alert/Oriented   Current Functional Status: Independent   Lives With alone   Able to Return to Prior Arrangements yes   Is patient able to care for self after discharge? Yes   Patient's perception of discharge disposition home or selfcare   Readmission Within The Last 30 Days no previous admission in last 30 days   Patient currently being followed by outpatient case management? No   Patient currently receives any other outside agency services? No   Equipment Currently Used at Home none   Do you have any problems affording any of your prescribed medications? No   Does the patient have transportation home? Yes   Transportation Available family or friend will provide   Does the patient receive services at the [facility]mad[facility] Clinic? No   Discharge Plan A Home with family   Discharge Plan B Home   Patient/Family In Agreement With Plan yes     appt made may 14

## 2018-05-07 NOTE — DISCHARGE SUMMARY
"[facility] Hospital Medicine  Discharge Summary      Patient Name: Suyapa Connelly  MRN: [id number]  Admission Date: 2018  Hospital Length of Stay: 2 days  Discharge Date and Time: 2018  4:41 PM  Attending Physician: Rosales Barton MD   Discharging Provider: Rosales Barton MD  Primary Care Provider: Erlinda Rahman NP  Hospital Medicine Team: [facility] HOSP MED A Rosales Barton MD    HPI:   This is a 52 y.o. female with T2DM on insulin, HTN, HLD, GERD, PVD and history of MI, who presents to the ED with a complaint of chest pain/palpitation with associated anxiety. She report daily to 2x a day episode of feeling of impending doom over the last 3 weeks, episode would last 10-15 mins, where she report shortness of breath, extreme anxiety, fear and palpitations. She report feeling frozen during these episodes, she is aware of her surrounding, denies LOC, denies urinary/bowel incontinence or tongue biting.  who are present during these episode denies seizure like activities, no falls, no LOC as well. She feel that she is slowly "losing my mind". She feels "like something bad is about to happen and she does not have any control over it", and feels "like she is standing outside of her body and can see herself looking around". She endorses some chest pain over the past few days, a dry cough, swelling in her ankles, chills, polydipsia. She report that on 3/31 her nephew  and that she feels bad/guilty that she could not help him. Both her and her  thinks that this might have been the stressor that lead to these episodes. Pt also reports difficulty focusing, difficulty balancing, loss of appetite (food tastes like cardboard), chronic nausea, and chronic numbness in her legs, chronic cramps in her calves. She denies SOB, numbness in her arms, vomiting, fevers, abdominal pain, dysuria, frequent urination, abnormal bowel movements, and back pain. She had an NSTEMI in , and today's " "complaints feel different than her symptoms at that time. She has stents in her legs, report that she lost insurance and was unable to continue Plavix or Gabapentin. She has been taking all of her medications. For DM, she has been taking 50 mg Lantus and 500 mg Metformin. On a "bad day" her blood sugar can be around 250. Pt denies hx of anxiety or depression, not on any psych meds. But report that her mother had 2 "nervous breakdown" episodes. Denies hx of seizure.     In ED, pt was found to be hyperglycemic, hypocalcemic and hypokalemic. Hypertension noted as well.  She also had one of these episode, per nursing pt was just staring at the wall but no seizure activity, pt was aware that she was staring at the wall but she was "frozen". CT head ordered per ED team. Return to baseline on hospital medicine evaluation.        * No surgery found *      Hospital Course:   Admitted on 5/5 for MDD, generalized anxiety and chest pain/palpitation episodes. Found to have uncontrolled DM as she was unable to get insulin due to insurance coverage. Work up of these episodes has r/o infectious, metabolic causes. Neuro work up so far has been negative for EEG was unremarkable, brain imaging were negative. Psych saw patient, started her on Lexapro 10mg and PRN vistaril. Pt is to follow up with outpatient counseling. Medications were refills for D/C as she was out of all meds. She was started on Novolog pens (she has used it previously), continue long acting lantus. Diabetes education as outpatient ordered. Pt with close follow up PCP on the 14th.    VT home.     Consults:   Consults         Status Ordering Provider     Case Management/  Once     Provider:  (Not yet assigned)    Acknowledged DAVID RUSSELL     Inpatient consult to Psychiatry  Once     Provider:  (Not yet assigned)    Completed DAVID RUSSELL     Inpatient consult to Registered Dietitian/Nutritionist  Once     Provider:  (Not yet assigned)    " Completed DAVID RUSSELL          Assessment &   * Panic attack as reaction to stress     MDD, moderate, recurrent, without psychotic features  Generalized Anxiety Disorder  Bereavement  - recent nephew passing away on 3/31, now with symptoms similar to panic attack episode  - Per psych, Start Lexapro 10 mg PO daily.  Vistaril 50 mg PO BID PRN anxiety.  - Outpatient psychotherapy and can follow at [facility] after hospital discharge.    - Pt to set up on D/C          Acute encephalopathy     - Episodic of shortness of breath, extreme anxiety, fear and palpitations, report being frozen during these episodes, she is aware of her surrounding, denies LOC, denies urinary/bowel incontinence or tongue biting.   - CT head/ and MRI brain: negative for structural issue, she is getting EEG 5/6 to r/o neurological cause ie seizure  - Fixed metabolic cause (hyperglycemia, hypocalcemia, hypokalemia)  - UA and CXR negative for infectious causes  - TSH wnl  - Psych consult to help evaluate for panic attack as cause given presentation and recent major stressor, Lexapro started 10mg QD, PRN vistaril  - suspect it to be anxiety driven at this time             Type 2 diabetes mellitus without complication, with long-term current use of insulin     - hyperglycemia in ED, glucose at 392, not in DKA given neg ketones in urine and negative beta-hydroxybutyrate  - A1C is >14, which means estimate baseline glucose for patient is ~357  - Reported on Lantus 50U QHS and metformin 500mg BID, (pt report not consistently taking meds due to insurance issue)  - 9U Novolog with meal continue 50U of Levemir and monitor  - POCT glucose check ACHS  - Metabolic acidosis resolved  - D/C home with Novolog vials (she has used it in the past) and Lantus 50U QHS  - Diabetes education referral placed, testing strips, glucometer and supplies ordered  - Pt has PCP follow up soon for refills of meds             Vitamin D deficiency     - supplementation started  -  will need outpatient monitoring on D/C             Essential hypertension     - she reported started taking  20mg of lisinopril as she has been out  - increase to 40mg QD for now             Malnutrition of moderate degree     - albumin at 2  - nutrition consulted to educate pt on Diabetic diet             Peripheral vascular disease     - Report chronic LE swelling  - CTM, can consider lasix if needed             Peripheral artery disease     - report having stents placed in her legs with chronic LE swelling  - Continue ASA  - Pt reported stop taking Plavix and statin after losing insurance, will restart plavix and add atorvastatin  - LE neuropathy: restart gabapentin               Plan       Final Active Diagnoses:    Diagnosis Date Noted POA    PRINCIPAL PROBLEM:  Panic attack as reaction to stress [F41.0, F43.0] 05/05/2018 Yes    Acute encephalopathy [G93.40] 05/05/2018 Yes    Type 2 diabetes mellitus without complication, with long-term current use of insulin [E11.9, Z79.4] 05/05/2018 Not Applicable    CAROLIN (generalized anxiety disorder) [F41.1] 05/07/2018 Yes     Chronic    MDD (major depressive disorder), recurrent episode, moderate [F33.1] 05/07/2018 Yes     Chronic    Bereavement [Z63.4] 05/07/2018 Not Applicable    Vitamin D deficiency [E55.9] 05/06/2018 Yes    Malnutrition of moderate degree [E44.0] 05/05/2018 Yes    Essential hypertension [I10] 05/05/2018 Yes    Peripheral vascular disease [I73.9] 10/06/2015 Yes    Peripheral artery disease [I73.9]  Yes      Problems Resolved During this Admission:    Diagnosis Date Noted Date Resolved POA    Hypokalemia [E87.6] 05/05/2018 05/06/2018 Yes    Hypocalcemia [E83.51] 05/05/2018 05/06/2018 Yes       Discharged Condition: stable    Disposition: Home or Self Care    Follow Up:  Follow-up Information     Call to follow up.    Why:  follow up at [facility], Call [phone] to set up Appointment           Go to Erlinda Rahman NP.    Specialty:   Family Medicine  Why:  [facility] on Monday MAY 14 TH at 09:30  AM  Contact information:  [id number] [facility] [id number] [phone]                 Patient Instructions:     Ambulatory consult to Diabetic Education   Referral Priority: Routine Referral Type: Consultation   Referral Reason: Specialty Services Required    Requested Specialty: Diabetes    Number of Visits Requested: 1 Expiration Date: 05/07/19     Diet diabetic     Activity as tolerated     Notify your health care provider if you experience any of the following:  severe uncontrolled pain     Notify your health care provider if you experience any of the following:  persistent nausea and vomiting or diarrhea         Significant Diagnostic Studies: Labs:   CMP   Recent Labs  Lab 05/07/18  0646   *   K 4.9      CO2 20*   *   BUN 10   CREATININE 0.8   CALCIUM 8.6*   PROT 6.4   ALBUMIN 2.4*   BILITOT 0.4   ALKPHOS 103   AST 10   ALT 6*   ANIONGAP 7*   ESTGFRAFRICA >60.0   EGFRNONAA >60.0   , CBC   Recent Labs  Lab 05/07/18  0646   WBC 7.37   HGB 11.4*   HCT 36.1*       and A1C:   Recent Labs  Lab 05/05/18  1134   HGBA1C >14.0*       Pending Diagnostic Studies:     None         Medications:  Reconciled Home Medications:      Medication List      START taking these medications    atorvastatin 40 MG tablet  Commonly known as:  LIPITOR  Take 1 tablet (40 mg total) by mouth once daily.     BASAGLAR KWIKPEN U-100 INSULIN 100 unit/mL (3 mL) Inpn pen  Generic drug:  insulin glargine  Inject 50 Units into the skin every evening.  Replaces:  insulin glargine 100 unit/mL injection     carvedilol 6.25 MG tablet  Commonly known as:  COREG  Take 1 tablet (6.25 mg total) by mouth 2 (two) times daily with meals.     escitalopram oxalate 10 MG tablet  Commonly known as:  LEXAPRO  Take 1 tablet (10 mg total) by mouth once daily.     hydrOXYzine pamoate 50 MG Cap  Commonly known as:  VISTARIL  Take 1 capsule  "(50 mg total) by mouth every 6 (six) hours as needed (anxiety).     insulin syringe-needle U-100 0.3 mL 30 gauge x 5/16 Syrg  1 each by Misc.(Non-Drug; Combo Route) route 2 (two) times daily with meals.     NovoLOG U-100 Insulin aspart 100 unit/mL injection  Generic drug:  insulin aspart U-100  Inject 9 Units into the skin 3 (three) times daily with meals.     ONETOUCH DELICA LANCETS 30 gauge Misc  Generic drug:  lancets  use to test blood glucose 2 (two) times daily with meals.     ONETOUCH VERIO Strp  Generic drug:  blood sugar diagnostic  test  blood glucose 2 (two) times daily with meals.     ONETOUCH VERIO SYSTEM Misc  Generic drug:  blood-glucose meter  use as directed     pen needle, diabetic 32 gauge x 5/32" Ndle  1 each by Misc.(Non-Drug; Combo Route) route 2 (two) times daily with meals.     VITAMIN D2 50,000 unit Cap  Generic drug:  ergocalciferol  Take 1 capsule (50,000 Units total) by mouth every 7 days.  Start taking on:  5/13/2018        CHANGE how you take these medications    clopidogrel 75 mg tablet  Commonly known as:  PLAVIX  Take 1 tablet (75 mg total) by mouth once daily.  What changed:  Another medication with the same name was removed. Continue taking this medication, and follow the directions you see here.     gabapentin 300 MG capsule  Commonly known as:  NEURONTIN  Take 1 capsule (300 mg total) by mouth 3 (three) times daily.  What changed:  · how much to take  · how to take this  · when to take this     lisinopril 40 MG tablet  Commonly known as:  PRINIVIL,ZESTRIL  Take 1 tablet (40 mg total) by mouth once daily.  What changed:  · medication strength  · how much to take        CONTINUE taking these medications    aspirin 325 MG tablet  Take 1 tablet (325 mg total) by mouth once daily.     famotidine 20 MG tablet  Commonly known as:  PEPCID  Take 1 tablet (20 mg total) by mouth 2 (two) times daily.        STOP taking these medications    clindamycin 150 MG capsule  Commonly known as:  " CLEOCIN     docusate sodium 100 MG capsule  Commonly known as:  COLACE     esomeprazole 40 MG capsule  Commonly known as:  NEXIUM     hydrocodone-acetaminophen 5-325mg 5-325 mg per tablet  Commonly known as:  NORCO     insulin glargine 100 unit/mL injection  Commonly known as:  LANTUS  Replaced by:  BASAGLAR KWIKPEN U-100 INSULIN 100 unit/mL (3 mL) Inpn pen     LEVEMIR U-100 INSULIN 100 unit/mL injection  Generic drug:  insulin detemir U-100     metFORMIN 500 MG tablet  Commonly known as:  GLUCOPHAGE     ondansetron 4 MG tablet  Commonly known as:  ZOFRAN     pravastatin 20 MG tablet  Commonly known as:  PRAVACHOL     traMADol 50 mg tablet  Commonly known as:  ULTRAM            Indwelling Lines/Drains at time of discharge:   Lines/Drains/Airways          No matching active lines, drains, or airways          Time spent on the discharge of patient: >30 minutes  Patient was seen and examined on the date of discharge and determined to be suitable for discharge.         Rosales Barton MD  Department of Hospital Medicine  [facility]

## 2018-05-07 NOTE — HOSPITAL COURSE
"05/07/2018: PEE, patient reports feeling "better." Received first dose Lexapro yesterday, denies AEs. Per chart received PRN Vistaril x1 yesterday for anxiety, patient does not recall taking. She reports feeling "mentally tired" after "episodes" and  reports yesterday she had an attack in which she became "stiff as a board" for the first time and that this was witnessed by RN. No documentation of episode in chart.     "

## 2018-05-09 NOTE — PLAN OF CARE
Per chart notes, pt dc 5/7 but appt was made for pt . See previous notes     05/08/18 0818   Final Note   Assessment Type Final Discharge Note   Discharge Disposition Home   Hospital Follow Up  Appt(s) scheduled? Yes   Discharge plans and expectations educations in teach back method with documentation complete? Yes   Right Care Referral Info   Post Acute Recommendation No Care

## 2018-05-15 ENCOUNTER — PATIENT OUTREACH (OUTPATIENT)
Dept: DIABETES | Facility: CLINIC | Age: 53
End: 2018-05-15

## 2018-05-16 NOTE — PROCEDURES
DATE OF SERVICE:  05/06/2018    EEG NUMBER:   .  ELECTROENCEPHALOGRAM REPORT     METHODOLOGY:  Electroencephalographic (EEG) recording is recorded with   electrodes placed according to the International 10-20 placement system.  Thirty   two (32) channels of digital signal (sampling rate of 512/sec), including T1   and T2, were simultaneously recorded from the scalp and may include EKG, EMG,   and/or eye monitors.  Recording band pass was 0.1 to 512 Hz.  Digital video   recording of the patient is simultaneously recorded with the EEG.  The patient   is instructed to report clinical symptoms which may occur during the recording   session.  EEG and video recording are stored and archived in digital format.    Activation procedures, which include photic stimulation, hyperventilation and   instructing patients to perform simple tasks, are done in selected patients.    The EEG is displayed on a monitor screen and can be reviewed using different   montages.  Computer assisted-analysis is employed to detect spike and   electrographic seizure activity.   The entire record is submitted for computer   analysis.  The entire recording is visually reviewed, and the times identified   by computer analysis as being spikes or seizures are reviewed again.    Compressed spectral analysis (CSA) is also performed on the activity recorded   from each individual channel.  This is displayed as a power display of   frequencies from 0 to 30 Hz over time.   The CSA is reviewed looking for   asymmetries in power between homologous areas of the scalp, then compared with   the original EEG recording.    ProPlan software was also utilized in the review of this study.  This software   suite analyzes the EEG recording in multiple domains.  Coherence and rhythmicity   are computed to identify EEG sections which may contain organized seizures.    Each channel undergoes analysis to detect the presence of spike and sharp waves   which have  "special and morphological characteristics of epileptic activity.  The   routine EEG recording is converted from special into frequency domain.  This is   then displayed comparing homologous areas to identify areas of significant   asymmetry.  Algorithm to identify non-cortically generated artifact is used to   separate artifact from the EEG.     EEG FINDINGS:  The patient was asleep at the onset of the recording.  Background   consisted of a generalized mixture of theta with lower amplitude beta   intermixed and is punctuated by rhythmic 14 to 16 Hz spindles, which are   symmetrical over the 2 hemispheres and best seen in the parasagittal areas.    Some posterior 6 Hz activity was noted.  Later, generalized bursts of vertex   maximum delta and V waves were recorded.  Upon awakening, a very rhythmic 11 Hz   posterior dominant rhythm was seen in the occipital, parietal, and posterior   temporal regions bilaterally.  Waking and sleeping background was symmetrical   and there were no lateralized or focal changes and no spike or sharp wave   activity seen.  Activation procedures were not carried out.    IMPRESSION:  Normal waking and sleeping EEG.    CLINICAL CORRELATION:  The patient is a 52-year-old female with diabetes,   hypertension, hyperlipidemia and GERD.  She presented with chest pain and some   other associated symptoms and having a feeling of being anxious "like, I'm   frozen."  EEG was requested to evaluate for the presence of possible seizure   activity, but this tracing is unmitigatedly normal.      RR/HN  dd: 05/16/2018 14:25:47 (CDT)  td: 05/16/2018 14:41:20 (CDT)  Doc ID   #[id number]  Job ID #[id number]    CC:   "

## 2018-06-01 ENCOUNTER — HOSPITAL ENCOUNTER (OUTPATIENT)
Facility: HOSPITAL | Age: 53
Discharge: HOME | End: 2018-06-02
Attending: EMERGENCY MEDICINE | Admitting: HOSPITALIST
Payer: MEDICAID

## 2018-06-01 DIAGNOSIS — R07.9 CHEST PAIN: ICD-10-CM

## 2018-06-01 DIAGNOSIS — N17.9 AKI (ACUTE KIDNEY INJURY): Primary | ICD-10-CM

## 2018-06-01 DIAGNOSIS — R00.0 TACHYCARDIA: ICD-10-CM

## 2018-06-01 DIAGNOSIS — S37.009A INJURY OF KIDNEY, UNSPECIFIED LATERALITY, INITIAL ENCOUNTER: ICD-10-CM

## 2018-06-01 DIAGNOSIS — R11.2 NAUSEA AND VOMITING, INTRACTABILITY OF VOMITING NOT SPECIFIED, UNSPECIFIED VOMITING TYPE: ICD-10-CM

## 2018-06-01 PROBLEM — E86.0 DEHYDRATION: Status: ACTIVE | Noted: 2018-06-01

## 2018-06-01 LAB
ALBUMIN SERPL BCP-MCNC: 3.3 G/DL
ALP SERPL-CCNC: 131 U/L
ALT SERPL W/O P-5'-P-CCNC: 7 U/L
ANION GAP SERPL CALC-SCNC: 18 MMOL/L
AST SERPL-CCNC: 10 U/L
B-OH-BUTYR BLD STRIP-SCNC: 0.3 MMOL/L
BACTERIA #/AREA URNS AUTO: ABNORMAL /HPF
BASOPHILS # BLD AUTO: 0.03 K/UL
BASOPHILS NFR BLD: 0.3 %
BILIRUB SERPL-MCNC: 0.9 MG/DL
BILIRUB UR QL STRIP: NEGATIVE
BNP SERPL-MCNC: 12 PG/ML
BUN SERPL-MCNC: 36 MG/DL
CALCIUM SERPL-MCNC: 10.3 MG/DL
CHLORIDE SERPL-SCNC: 97 MMOL/L
CK SERPL-CCNC: 134 U/L
CLARITY UR REFRACT.AUTO: ABNORMAL
CO2 SERPL-SCNC: 19 MMOL/L
COLOR UR AUTO: YELLOW
CREAT SERPL-MCNC: 1.4 MG/DL
DIFFERENTIAL METHOD: ABNORMAL
EOSINOPHIL # BLD AUTO: 0 K/UL
EOSINOPHIL NFR BLD: 0 %
ERYTHROCYTE [DISTWIDTH] IN BLOOD BY AUTOMATED COUNT: 12.9 %
EST. GFR  (AFRICAN AMERICAN): 50.2 ML/MIN/1.73 M^2
EST. GFR  (NON AFRICAN AMERICAN): 43.5 ML/MIN/1.73 M^2
GLUCOSE SERPL-MCNC: 170 MG/DL
GLUCOSE UR QL STRIP: NEGATIVE
HCT VFR BLD AUTO: 45.3 %
HGB BLD-MCNC: 15.4 G/DL
HGB UR QL STRIP: ABNORMAL
HYALINE CASTS UR QL AUTO: 1 /LPF
IMM GRANULOCYTES # BLD AUTO: 0.04 K/UL
IMM GRANULOCYTES NFR BLD AUTO: 0.4 %
KETONES UR QL STRIP: NEGATIVE
LACTATE SERPL-SCNC: 1.4 MMOL/L
LACTATE SERPL-SCNC: 2.7 MMOL/L
LEUKOCYTE ESTERASE UR QL STRIP: ABNORMAL
LIPASE SERPL-CCNC: 60 U/L
LYMPHOCYTES # BLD AUTO: 2 K/UL
LYMPHOCYTES NFR BLD: 18.3 %
MCH RBC QN AUTO: 31 PG
MCHC RBC AUTO-ENTMCNC: 34 G/DL
MCV RBC AUTO: 91 FL
MICROSCOPIC COMMENT: ABNORMAL
MONOCYTES # BLD AUTO: 0.6 K/UL
MONOCYTES NFR BLD: 5.5 %
NEUTROPHILS # BLD AUTO: 8.4 K/UL
NEUTROPHILS NFR BLD: 75.5 %
NITRITE UR QL STRIP: NEGATIVE
NRBC BLD-RTO: 0 /100 WBC
PH UR STRIP: 6 [PH] (ref 5–8)
PLATELET # BLD AUTO: 463 K/UL
PMV BLD AUTO: 11.4 FL
POCT GLUCOSE: 109 MG/DL (ref 70–110)
POCT GLUCOSE: 155 MG/DL (ref 70–110)
POCT GLUCOSE: 183 MG/DL (ref 70–110)
POTASSIUM SERPL-SCNC: 3.2 MMOL/L
PROT SERPL-MCNC: 8.9 G/DL
PROT UR QL STRIP: ABNORMAL
RBC # BLD AUTO: 4.96 M/UL
RBC #/AREA URNS AUTO: 31 /HPF (ref 0–4)
SODIUM SERPL-SCNC: 134 MMOL/L
SP GR UR STRIP: >=1.03 (ref 1–1.03)
SQUAMOUS #/AREA URNS AUTO: 1 /HPF
TROPONIN I SERPL DL<=0.01 NG/ML-MCNC: 0.01 NG/ML
TROPONIN I SERPL DL<=0.01 NG/ML-MCNC: 0.02 NG/ML
URN SPEC COLLECT METH UR: ABNORMAL
UROBILINOGEN UR STRIP-ACNC: NEGATIVE EU/DL
WBC # BLD AUTO: 11.16 K/UL
WBC #/AREA URNS AUTO: 39 /HPF (ref 0–5)

## 2018-06-01 PROCEDURE — 99285 EMERGENCY DEPT VISIT HI MDM: CPT | Mod: 25

## 2018-06-01 PROCEDURE — 99285 EMERGENCY DEPT VISIT HI MDM: CPT | Mod: ,,,

## 2018-06-01 PROCEDURE — 84484 ASSAY OF TROPONIN QUANT: CPT

## 2018-06-01 PROCEDURE — 87086 URINE CULTURE/COLONY COUNT: CPT

## 2018-06-01 PROCEDURE — 96361 HYDRATE IV INFUSION ADD-ON: CPT

## 2018-06-01 PROCEDURE — 93010 ELECTROCARDIOGRAM REPORT: CPT | Mod: ,,, | Performed by: INTERNAL MEDICINE

## 2018-06-01 PROCEDURE — 96360 HYDRATION IV INFUSION INIT: CPT | Mod: 59

## 2018-06-01 PROCEDURE — 82962 GLUCOSE BLOOD TEST: CPT

## 2018-06-01 PROCEDURE — 99219 PR INITIAL OBSERVATION CARE,LEVL II: CPT | Mod: ,,, | Performed by: PHYSICIAN ASSISTANT

## 2018-06-01 PROCEDURE — 83605 ASSAY OF LACTIC ACID: CPT | Mod: 91

## 2018-06-01 PROCEDURE — 87186 SC STD MICRODIL/AGAR DIL: CPT

## 2018-06-01 PROCEDURE — 82010 KETONE BODYS QUAN: CPT

## 2018-06-01 PROCEDURE — 87088 URINE BACTERIA CULTURE: CPT

## 2018-06-01 PROCEDURE — 83605 ASSAY OF LACTIC ACID: CPT

## 2018-06-01 PROCEDURE — 63600175 PHARM REV CODE 636 W HCPCS: Performed by: PHYSICIAN ASSISTANT

## 2018-06-01 PROCEDURE — G0378 HOSPITAL OBSERVATION PER HR: HCPCS

## 2018-06-01 PROCEDURE — 25000003 PHARM REV CODE 250: Performed by: PHYSICIAN ASSISTANT

## 2018-06-01 PROCEDURE — 25500020 PHARM REV CODE 255: Performed by: EMERGENCY MEDICINE

## 2018-06-01 PROCEDURE — 80053 COMPREHEN METABOLIC PANEL: CPT

## 2018-06-01 PROCEDURE — 82550 ASSAY OF CK (CPK): CPT

## 2018-06-01 PROCEDURE — 85025 COMPLETE CBC W/AUTO DIFF WBC: CPT

## 2018-06-01 PROCEDURE — 93005 ELECTROCARDIOGRAM TRACING: CPT

## 2018-06-01 PROCEDURE — 83880 ASSAY OF NATRIURETIC PEPTIDE: CPT

## 2018-06-01 PROCEDURE — A4216 STERILE WATER/SALINE, 10 ML: HCPCS | Performed by: PHYSICIAN ASSISTANT

## 2018-06-01 PROCEDURE — 25000003 PHARM REV CODE 250: Performed by: STUDENT IN AN ORGANIZED HEALTH CARE EDUCATION/TRAINING PROGRAM

## 2018-06-01 PROCEDURE — 81001 URINALYSIS AUTO W/SCOPE: CPT

## 2018-06-01 PROCEDURE — 87077 CULTURE AEROBIC IDENTIFY: CPT

## 2018-06-01 PROCEDURE — 83690 ASSAY OF LIPASE: CPT

## 2018-06-01 RX ORDER — ASPIRIN 325 MG
325 TABLET ORAL DAILY
Status: DISCONTINUED | OUTPATIENT
Start: 2018-06-01 | End: 2018-06-02 | Stop reason: HOSPADM

## 2018-06-01 RX ORDER — CLOPIDOGREL BISULFATE 75 MG/1
75 TABLET ORAL DAILY
Status: DISCONTINUED | OUTPATIENT
Start: 2018-06-01 | End: 2018-06-02 | Stop reason: HOSPADM

## 2018-06-01 RX ORDER — PROCHLORPERAZINE EDISYLATE 5 MG/ML
10 INJECTION INTRAMUSCULAR; INTRAVENOUS EVERY 6 HOURS PRN
Status: DISCONTINUED | OUTPATIENT
Start: 2018-06-01 | End: 2018-06-02 | Stop reason: HOSPADM

## 2018-06-01 RX ORDER — GLUCAGON 1 MG
1 KIT INJECTION
Status: DISCONTINUED | OUTPATIENT
Start: 2018-06-01 | End: 2018-06-02 | Stop reason: HOSPADM

## 2018-06-01 RX ORDER — IBUPROFEN 200 MG
16 TABLET ORAL
Status: DISCONTINUED | OUTPATIENT
Start: 2018-06-01 | End: 2018-06-02 | Stop reason: HOSPADM

## 2018-06-01 RX ORDER — ERGOCALCIFEROL 1.25 MG/1
50000 CAPSULE ORAL
Status: DISCONTINUED | OUTPATIENT
Start: 2018-06-03 | End: 2018-06-02 | Stop reason: HOSPADM

## 2018-06-01 RX ORDER — INSULIN ASPART 100 [IU]/ML
1-10 INJECTION, SOLUTION INTRAVENOUS; SUBCUTANEOUS
Status: DISCONTINUED | OUTPATIENT
Start: 2018-06-01 | End: 2018-06-02 | Stop reason: HOSPADM

## 2018-06-01 RX ORDER — CARVEDILOL 6.25 MG/1
6.25 TABLET ORAL 2 TIMES DAILY WITH MEALS
Status: DISCONTINUED | OUTPATIENT
Start: 2018-06-01 | End: 2018-06-02 | Stop reason: HOSPADM

## 2018-06-01 RX ORDER — ONDANSETRON 8 MG/1
8 TABLET, ORALLY DISINTEGRATING ORAL EVERY 8 HOURS PRN
Status: DISCONTINUED | OUTPATIENT
Start: 2018-06-01 | End: 2018-06-02 | Stop reason: HOSPADM

## 2018-06-01 RX ORDER — ESCITALOPRAM OXALATE 10 MG/1
10 TABLET ORAL DAILY
Status: DISCONTINUED | OUTPATIENT
Start: 2018-06-02 | End: 2018-06-02 | Stop reason: HOSPADM

## 2018-06-01 RX ORDER — HYDROXYZINE PAMOATE 25 MG/1
50 CAPSULE ORAL EVERY 6 HOURS PRN
Status: DISCONTINUED | OUTPATIENT
Start: 2018-06-01 | End: 2018-06-02 | Stop reason: HOSPADM

## 2018-06-01 RX ORDER — ATORVASTATIN CALCIUM 20 MG/1
40 TABLET, FILM COATED ORAL NIGHTLY
Status: DISCONTINUED | OUTPATIENT
Start: 2018-06-01 | End: 2018-06-02 | Stop reason: HOSPADM

## 2018-06-01 RX ORDER — ACETAMINOPHEN 325 MG/1
650 TABLET ORAL EVERY 6 HOURS PRN
Status: DISCONTINUED | OUTPATIENT
Start: 2018-06-01 | End: 2018-06-02 | Stop reason: HOSPADM

## 2018-06-01 RX ORDER — GABAPENTIN 300 MG/1
300 CAPSULE ORAL 3 TIMES DAILY
Status: DISCONTINUED | OUTPATIENT
Start: 2018-06-01 | End: 2018-06-02 | Stop reason: HOSPADM

## 2018-06-01 RX ORDER — OXYCODONE HYDROCHLORIDE 5 MG/1
15 TABLET ORAL EVERY 4 HOURS PRN
Status: DISCONTINUED | OUTPATIENT
Start: 2018-06-01 | End: 2018-06-02 | Stop reason: HOSPADM

## 2018-06-01 RX ORDER — IBUPROFEN 200 MG
24 TABLET ORAL
Status: DISCONTINUED | OUTPATIENT
Start: 2018-06-01 | End: 2018-06-02 | Stop reason: HOSPADM

## 2018-06-01 RX ORDER — PEN NEEDLE, DIABETIC 30 GX3/16"
1 NEEDLE, DISPOSABLE MISCELLANEOUS 2 TIMES DAILY WITH MEALS
Qty: 100 EACH | Refills: 11 | Status: CANCELLED | OUTPATIENT
Start: 2018-05-07

## 2018-06-01 RX ORDER — ENOXAPARIN SODIUM 100 MG/ML
40 INJECTION SUBCUTANEOUS EVERY 24 HOURS
Status: DISCONTINUED | OUTPATIENT
Start: 2018-06-01 | End: 2018-06-02 | Stop reason: HOSPADM

## 2018-06-01 RX ORDER — INSULIN ASPART 100 [IU]/ML
4 INJECTION, SOLUTION INTRAVENOUS; SUBCUTANEOUS
Status: DISCONTINUED | OUTPATIENT
Start: 2018-06-02 | End: 2018-06-01

## 2018-06-01 RX ORDER — HYDRALAZINE HYDROCHLORIDE 25 MG/1
25 TABLET, FILM COATED ORAL EVERY 6 HOURS PRN
Status: DISCONTINUED | OUTPATIENT
Start: 2018-06-01 | End: 2018-06-02 | Stop reason: HOSPADM

## 2018-06-01 RX ORDER — HYDROCODONE BITARTRATE AND ACETAMINOPHEN 5; 325 MG/1; MG/1
1 TABLET ORAL EVERY 4 HOURS PRN
Status: DISCONTINUED | OUTPATIENT
Start: 2018-06-01 | End: 2018-06-02 | Stop reason: HOSPADM

## 2018-06-01 RX ORDER — PEN NEEDLE, DIABETIC 29 G X1/2"
1 NEEDLE, DISPOSABLE MISCELLANEOUS 2 TIMES DAILY WITH MEALS
Qty: 100 EACH | Refills: 11 | Status: CANCELLED | OUTPATIENT
Start: 2018-05-07

## 2018-06-01 RX ORDER — SODIUM CHLORIDE 9 MG/ML
INJECTION, SOLUTION INTRAVENOUS CONTINUOUS
Status: DISCONTINUED | OUTPATIENT
Start: 2018-06-01 | End: 2018-06-02 | Stop reason: HOSPADM

## 2018-06-01 RX ORDER — ONDANSETRON 8 MG/1
8 TABLET, ORALLY DISINTEGRATING ORAL
Status: COMPLETED | OUTPATIENT
Start: 2018-06-01 | End: 2018-06-01

## 2018-06-01 RX ORDER — POTASSIUM CHLORIDE 20 MEQ/1
40 TABLET, EXTENDED RELEASE ORAL ONCE
Status: COMPLETED | OUTPATIENT
Start: 2018-06-01 | End: 2018-06-01

## 2018-06-01 RX ORDER — FAMOTIDINE 20 MG/1
20 TABLET, FILM COATED ORAL 2 TIMES DAILY
Status: DISCONTINUED | OUTPATIENT
Start: 2018-06-01 | End: 2018-06-02 | Stop reason: HOSPADM

## 2018-06-01 RX ORDER — ONDANSETRON 8 MG/1
8 TABLET, ORALLY DISINTEGRATING ORAL EVERY 8 HOURS PRN
Status: DISCONTINUED | OUTPATIENT
Start: 2018-06-01 | End: 2018-06-01 | Stop reason: SDUPTHER

## 2018-06-01 RX ORDER — SODIUM CHLORIDE 0.9 % (FLUSH) 0.9 %
3 SYRINGE (ML) INJECTION EVERY 8 HOURS
Status: DISCONTINUED | OUTPATIENT
Start: 2018-06-01 | End: 2018-06-02 | Stop reason: HOSPADM

## 2018-06-01 RX ADMIN — ASPIRIN 325 MG ORAL TABLET 325 MG: 325 PILL ORAL at 03:06

## 2018-06-01 RX ADMIN — SODIUM CHLORIDE, SODIUM LACTATE, POTASSIUM CHLORIDE, AND CALCIUM CHLORIDE 1000 ML: .6; .31; .03; .02 INJECTION, SOLUTION INTRAVENOUS at 02:06

## 2018-06-01 RX ADMIN — ALUMINUM HYDROXIDE, MAGNESIUM HYDROXIDE, AND SIMETHICONE 50 ML: 200; 200; 20 SUSPENSION ORAL at 03:06

## 2018-06-01 RX ADMIN — INSULIN DETEMIR 7 UNITS: 100 INJECTION, SOLUTION SUBCUTANEOUS at 10:06

## 2018-06-01 RX ADMIN — ATORVASTATIN CALCIUM 40 MG: 20 TABLET, FILM COATED ORAL at 10:06

## 2018-06-01 RX ADMIN — SODIUM CHLORIDE, PRESERVATIVE FREE 3 ML: 5 INJECTION INTRAVENOUS at 10:06

## 2018-06-01 RX ADMIN — POTASSIUM CHLORIDE 40 MEQ: 1500 TABLET, EXTENDED RELEASE ORAL at 10:06

## 2018-06-01 RX ADMIN — SODIUM CHLORIDE, SODIUM LACTATE, POTASSIUM CHLORIDE, AND CALCIUM CHLORIDE 1000 ML: .6; .31; .03; .02 INJECTION, SOLUTION INTRAVENOUS at 09:06

## 2018-06-01 RX ADMIN — ONDANSETRON 8 MG: 8 TABLET, ORALLY DISINTEGRATING ORAL at 03:06

## 2018-06-01 RX ADMIN — ENOXAPARIN SODIUM 40 MG: 100 INJECTION SUBCUTANEOUS at 10:06

## 2018-06-01 RX ADMIN — ONDANSETRON 8 MG: 8 TABLET, ORALLY DISINTEGRATING ORAL at 09:06

## 2018-06-01 RX ADMIN — IOHEXOL 100 ML: 350 INJECTION, SOLUTION INTRAVENOUS at 01:06

## 2018-06-01 RX ADMIN — SODIUM CHLORIDE: 0.9 INJECTION, SOLUTION INTRAVENOUS at 10:06

## 2018-06-01 RX ADMIN — GABAPENTIN 300 MG: 300 CAPSULE ORAL at 10:06

## 2018-06-01 RX ADMIN — PROCHLORPERAZINE EDISYLATE 10 MG: 5 INJECTION INTRAMUSCULAR; INTRAVENOUS at 11:06

## 2018-06-01 RX ADMIN — CARVEDILOL 6.25 MG: 6.25 TABLET, FILM COATED ORAL at 03:06

## 2018-06-01 RX ADMIN — FAMOTIDINE 20 MG: 20 TABLET ORAL at 10:06

## 2018-06-01 RX ADMIN — SODIUM CHLORIDE: 0.9 INJECTION, SOLUTION INTRAVENOUS at 04:06

## 2018-06-01 RX ADMIN — CLOPIDOGREL 75 MG: 75 TABLET, FILM COATED ORAL at 03:06

## 2018-06-01 NOTE — ED TRIAGE NOTES
52 year old female presents to the ED c/o nausea, vomiting, and generalized weakness. Recent hospital admission for similar

## 2018-06-01 NOTE — ASSESSMENT & PLAN NOTE
-  on arrival; recent A1c >14  - Home regimen: Lantus 50 qhs, Novolog 9U TIDWM  - Hospital regimen: Levemir 7U BID, Nov 4U TIDWM, mod dose SSI  - Monitor BGs and closely and adjust insulin PRN

## 2018-06-01 NOTE — ASSESSMENT & PLAN NOTE
Dehydration   In setting of GI losses; suspect exacerbated by emotional stress/anxiety  - N/V resolved on admission  - HDS on admission with tachycardia likely d/t dehydration  - Afebrile without leukocytosis  - Cr 1.4 on admission  - Lactate 2.7->1.4 s/p IVF  - Received 2L in the ED  - Continue IVF, supportive care, antiemetics, CLD/ADAT

## 2018-06-01 NOTE — ED NOTES
LOC: The patient is awake, alert and aware of environment with an anxious affect, the patient is oriented x 3, disoriented to time and speaking appropriately.  APPEARANCE: Patient resting comfortably and in no acute distress, patient is clean and well groomed, patient's clothing is properly fastened.  SKIN: The skin is warm and dry, patient has normal skin turgor and moist mucus membranes, skin intact, no breakdown or brusing noted.  MUSKULOSKELETAL: Patient moving all extremities well, no obvious swelling or deformities noted.  RESPIRATORY: Airway is open and patent, respirations are spontaneous, patient has a normal effort and rate. Breath sounds are clear and equal bilaterally.  CARDIAC: Normal heart sounds. No peripheral edema.Sinus tachycardia.   ABDOMEN: Soft and non tender to palpation, no distention noted. Bowel sounds present.  : WDL  NEURO: No neuro deficits, hand grasp equal, no drift noted, no facial droop noted. Speech is clear. QUEENIE

## 2018-06-01 NOTE — NURSING
Patient  received from emergency room , alert and in no distress. Iv normal saline up and hanging . Pole placed in room and iv fluids placed on pump.;patient voided and urine obtained for labs. Sent to lab. No complaints noted at this time. Positioned for comfort on bed.  at side.

## 2018-06-01 NOTE — ED NOTES
Pt arrived to room 1. Pt is awake, alert and anxious. Pt is oriented to person, place and situation. Pt stated that the year was 2019. Respirations are spontaneous with normal rate and effort. Pt denies pain. Pt reports light headedness.

## 2018-06-01 NOTE — ASSESSMENT & PLAN NOTE
- Continue home coreg 6.25 mg BID  - Lisinopril held for ROSLYN  - Hydralazine PRN  - Continue to monitor

## 2018-06-01 NOTE — H&P
"[facility] Hospital Medicine  History & Physical    Patient Name: Suyapa Connelly  MRN: [id number]  Admission Date: 6/1/2018  Attending Physician: Manasa Kennedy MD   Primary Care Provider: Erlinda Rahman NP    [facility] Medicine Team: [facility]OSP MED F Janessa Luther PA-C     Patient information was obtained from patient, spouse/SO, past medical records and ER records.     Subjective:     Principal Problem:ROSLYN (acute kidney injury)    Chief Complaint:   Chief Complaint   Patient presents with    Nausea     x 2 days    Emesis     x 2 days with some diarrhea        HPI: 52 year old female with a PMHx of HTN, uncontrolled DM2, PVD s/p stents presenting to the ER with  at bedside c/o N/V x2 days. Pt reports acute onset of vomiting that has persisted over the past 2 days. Pt reports decreased appetite with limited intake at home. Pt endorses "drinking water but it keeps coming up." Pt denies associated abdominal pain. Pt reports intermittent episodes of chest "heaviness" that occur at rest "in the center of my chest"; no radiation or association with exertion/exercise or SOB. Pt also endorses indigestion exacerbated by vomiting. At the time of my exam, pt denies CP, SOB, abdominal pain, diarrhea, blood in emesis, urinary complaints, weakness, speech/vision changes, fever/chills, recent illness/antibiotic use. Pt does endorse continued anxiety, depression, and difficulty processing the death of her nephew; denies SI/HI. Pt and  both state they think symptoms are related to significant stress/anxiety.     Per ER note, patient initially seen in intake when she had an episode of unresponsiveness. No shaking movements, tongue biting or incontinence. Per , these are the patient's "seizures" which she has occasionally during stressful situations and which he attributes to her anxiety. Of note, recent admission had extensive w/u including CTH, MRI, and EEG that were all " unremarkable.      In the ED, HDS with hypertension and tachycardia (improved with IVF). Afebrile without leukocytosis. Labs remarkable for K 3.2, BUN 36, Cr 1.4 (BL 0.8), and . Lactate 2.7->1.4 s/p IVF. CPK, Troponin, BNP, lipase, and beta hydroxy WNL. EKG shows sinus tachycardia (); no ischemic changes noted. CTA without evidence for ischemia.    Past Medical History:   Diagnosis Date    Diabetes mellitus     type 2    GERD (gastroesophageal reflux disease)     Hyperlipemia     Hypertension     Myocardial infarction 2010    minor-caused by stress per pt.       Past Surgical History:   Procedure Laterality Date    Angiogram - Right Extremity Right 7/9/15    angiogram-left leg  10/6/15       Review of patient's allergies indicates:   Allergen Reactions    Pcn [penicillins]      rash       No current facility-administered medications on file prior to encounter.      Current Outpatient Prescriptions on File Prior to Encounter   Medication Sig    aspirin 325 MG tablet Take 1 tablet (325 mg total) by mouth once daily.    atorvastatin (LIPITOR) 40 MG tablet Take 1 tablet (40 mg total) by mouth once daily.    blood sugar diagnostic Strp test  blood glucose 2 (two) times daily with meals.    blood-glucose meter Misc use as directed    carvedilol (COREG) 6.25 MG tablet Take 1 tablet (6.25 mg total) by mouth 2 (two) times daily with meals.    clopidogrel (PLAVIX) 75 mg tablet Take 1 tablet (75 mg total) by mouth once daily.    ergocalciferol (ERGOCALCIFEROL) 50,000 unit Cap Take 1 capsule (50,000 Units total) by mouth every 7 days.    escitalopram oxalate (LEXAPRO) 10 MG tablet Take 1 tablet (10 mg total) by mouth once daily.    famotidine (PEPCID) 20 MG tablet Take 1 tablet (20 mg total) by mouth 2 (two) times daily.    gabapentin (NEURONTIN) 300 MG capsule Take 1 capsule (300 mg total) by mouth 3 (three) times daily.    hydrOXYzine pamoate (VISTARIL) 50 MG Cap Take 1 capsule (50 mg total) by  "mouth every 6 (six) hours as needed (anxiety).    insulin aspart U-100 (NOVOLOG) 100 unit/mL injection Inject 9 Units into the skin 3 (three) times daily with meals.    insulin glargine (LANTUS SOLOSTAR) 100 unit/mL (3 mL) InPn pen Inject 50 Units into the skin every evening.    insulin syringe-needle U-100 0.3 mL 30 gauge x 5/16 Syrg 1 each by Misc.(Non-Drug; Combo Route) route 2 (two) times daily with meals.    lancets 30 gauge Misc use to test blood glucose 2 (two) times daily with meals.    lisinopril (PRINIVIL,ZESTRIL) 40 MG tablet Take 1 tablet (40 mg total) by mouth once daily.    pen needle, diabetic 32 gauge x 5/32" Ndle 1 each by Misc.(Non-Drug; Combo Route) route 2 (two) times daily with meals.    [DISCONTINUED] lancing device Misc 1 Device by Misc.(Non-Drug; Combo Route) route 2 (two) times daily with meals.     Family History     None        Social History Main Topics    Smoking status: Never Smoker    Smokeless tobacco: Never Used    Alcohol use No    Drug use: Yes     Types: Marijuana      Comment: used yesterday    Sexual activity: Not on file     Review of Systems   Constitutional: Positive for fatigue. Negative for chills, diaphoresis and fever.   HENT: Negative for congestion, hearing loss, sinus pressure and sore throat.    Eyes: Negative for photophobia and visual disturbance.   Respiratory: Negative for cough, chest tightness, shortness of breath and wheezing.    Cardiovascular: Positive for chest pain (resolved). Negative for palpitations and leg swelling.   Gastrointestinal: Positive for nausea and vomiting. Negative for abdominal distention, abdominal pain and diarrhea.        "indigestion"   Endocrine: Negative for cold intolerance and heat intolerance.   Genitourinary: Negative for difficulty urinating, dysuria, flank pain, frequency and hematuria.   Musculoskeletal: Negative for back pain, gait problem, myalgias and neck pain.   Skin: Negative for rash and wound. "   Allergic/Immunologic: Negative for immunocompromised state.   Neurological: Negative for dizziness, syncope, speech difficulty, weakness and headaches.   Hematological: Negative for adenopathy. Does not bruise/bleed easily.   Psychiatric/Behavioral: Negative for agitation, confusion, dysphoric mood and suicidal ideas. The patient is nervous/anxious.         Depression      Objective:     Vital Signs (Most Recent):  Temp: 98.3 °F (36.8 °C) (06/01/18 1516)  Pulse: (!) 112 (06/01/18 1616)  Resp: 16 (06/01/18 1616)  BP: (!) 174/99 (06/01/18 1616)  SpO2: 100 % (06/01/18 1616) Vital Signs (24h Range):  Temp:  [98.3 °F (36.8 °C)-98.4 °F (36.9 °C)] 98.3 °F (36.8 °C)  Pulse:  [109-123] 112  Resp:  [13-22] 16  SpO2:  [98 %-100 %] 100 %  BP: (107-181)/(72-99) 174/99     Weight: 96.2 kg (212 lb)  Body mass index is 33.71 kg/m².    Physical Exam   Constitutional: She is oriented to person, place, and time. She appears well-developed and well-nourished. No distress.   HENT:   Head: Normocephalic and atraumatic.   Eyes: Conjunctivae and EOM are normal. Right eye exhibits no discharge. Left eye exhibits no discharge. No scleral icterus.   Neck: Normal range of motion. Neck supple. No tracheal deviation present.   Cardiovascular: Regular rhythm, normal heart sounds and intact distal pulses.  Tachycardia present.    Pulmonary/Chest: Effort normal and breath sounds normal. No respiratory distress. She has no wheezes.   Abdominal: Soft. Bowel sounds are normal. She exhibits no distension. There is no tenderness.   Musculoskeletal: Normal range of motion. She exhibits no edema or tenderness.   Neurological: She is alert and oriented to person, place, and time. No cranial nerve deficit.   Skin: Skin is warm and dry. She is not diaphoretic. No erythema.   Psychiatric: She has a normal mood and affect. Her behavior is normal.   Endorses anxiety and depression; denies SI/HI/AVH         CRANIAL NERVES     CN III, IV, VI   Extraocular  motions are normal.        Significant Labs: All pertinent labs within the past 24 hours have been reviewed.    Significant Imaging: I have reviewed all pertinent imaging results/findings within the past 24 hours.    Assessment/Plan:     * ROSLYN (acute kidney injury)    Dehydration   In setting of GI losses; suspect exacerbated by emotional stress/anxiety  - N/V resolved on admission  - HDS on admission with tachycardia likely d/t dehydration  - Afebrile without leukocytosis  - Cr 1.4 on admission  - Lactate 2.7->1.4 s/p IVF  - Received 2L in the ED  - Continue IVF, supportive care, antiemetics, CLD/ADAT        Panic attack as reaction to stress    Bereavement   CAROLIN  - Likely contributing to above  - Recent admission 5/5-5/7 for sx suspected to be 2/2 anxiety d/t recent loss of nephew: extensive w/u including CTH, MRI and EEG that were all WNL; evaluated by psychiatry who rec starting lexapro, vistaril PRN anxiety, and outpatient counseling  - Continue home medications  - Has not gone to outpatient counseling yet; encouraged follow up        Hypokalemia, gastrointestinal losses    - K 3.2 on admit  - Replaced PO  - Trend daily        Essential hypertension    - Continue home coreg 6.25 mg BID  - Lisinopril held for ROSLYN  - Hydralazine PRN  - Continue to monitor        Type 2 diabetes mellitus without complication, with long-term current use of insulin    -  on arrival; recent A1c >14  - Home regimen: Lantus 50 qhs, Novolog 9U TIDWM  - Hospital regimen: Levemir 7U BID, mod dose SSI  - Plan to add Nov 4U TIDWM once tolerating PO  - Monitor BGs and closely and adjust insulin PRN        Peripheral artery disease    - Continue home  and plavix        Vitamin D deficiency    - Continue home supplementation qweek (Sunday)          VTE Risk Mitigation         Ordered     enoxaparin injection 40 mg  Daily      06/01/18 1521     Place HAILEY hose  Until discontinued      06/01/18 1521     Place sequential compression  device  Until discontinued      06/01/18 1521     IP VTE HIGH RISK PATIENT  Once      06/01/18 1521             Janessa Luther PA-C  Department of Hospital Medicine   [facility]

## 2018-06-01 NOTE — ED NOTES
The patient is awake, alert and acting age appropriately. Family at bedside.    No apparent distress noted. Airway is open and patent.  Respirations with normal effort and rate noted. Explanation of care provided to family and patient. Pt unable to urinate at this time. No needs at this time. Will continue to monitor.

## 2018-06-01 NOTE — ED PROVIDER NOTES
"Encounter Date: 6/1/2018    SCRIBE #1 NOTE: I, Heidi Ramirez, am scribing for, and in the presence of,  Dr. Lambert. I have scribed the following portions of the note - the Resident attestation.       History     Chief Complaint   Patient presents with    Nausea     x 2 days    Emesis     x 2 days with some diarrhea     52 F with T2DM on home insuline, HTN, PVD s/p stenting, MI with no stents presenting with 2 days of N/V/D. Unclear what the vomitus looks like, initially told that she hasn't been throwing up anything because she "hasn't eaten in 36 hours." Later told by nursing staff  reported some blood in vomitus. Patient also reports some substernal chest pressure since yesterday, dyspnea and abdominal pain. When patient initially seen in intake she had an episode where she became unresponsive and had lateral movements of her eyes. No shaking movements, tongue biting or incontinence. Per  these are the patient's seizures which she has occasionally during stressful situations and which he attributes to her anxiety. Diarrhea described as loose stool with an unknown amount of bright red blood.  has not seen this and reports stool has been "dark."    Patient has been trying to modify her insulin regimen while she has been nauseated and had decreased PO intake. Her blood glucose got as high as 350 but she took 10 units novalog and it came down under 200. On presentation her blood glucose is <200. Patient and  report that she has recently had significant stress and they are concerned this could be causing some of her symptoms.           Review of patient's allergies indicates:   Allergen Reactions    Pcn [penicillins]      rash     Past Medical History:   Diagnosis Date    Diabetes mellitus     type 2    GERD (gastroesophageal reflux disease)     Hyperlipemia     Hypertension     Myocardial infarction 2010    minor-caused by stress per pt.     Past Surgical History:   Procedure " Laterality Date    Angiogram - Right Extremity Right 7/9/15    angiogram-left leg  10/6/15     Family History   Problem Relation Age of Onset    Anesthesia problems Neg Hx      Social History   Substance Use Topics    Smoking status: Never Smoker    Smokeless tobacco: Never Used    Alcohol use No     Review of Systems   Constitutional: Negative for chills and fever.   HENT: Negative for sore throat and trouble swallowing.    Eyes: Negative for photophobia and visual disturbance.   Respiratory: Positive for shortness of breath. Negative for cough.    Cardiovascular: Positive for chest pain. Negative for palpitations and leg swelling.   Gastrointestinal: Positive for abdominal pain, anal bleeding, diarrhea, nausea and vomiting. Negative for abdominal distention.   Genitourinary: Negative for dysuria and hematuria.   Musculoskeletal: Negative for arthralgias and myalgias.   Skin: Negative for rash and wound.   Neurological: Positive for syncope. Negative for dizziness, weakness, numbness and headaches.   Psychiatric/Behavioral: Negative for agitation and confusion.       Physical Exam     Initial Vitals [06/01/18 0819]   BP Pulse Resp Temp SpO2   107/72 (!) 123 (!) 22 98.4 °F (36.9 °C) 98 %      MAP       83.67         Physical Exam    Nursing note and vitals reviewed.  Constitutional: She is not diaphoretic.   HENT:   Head: Normocephalic and atraumatic.   Mouth/Throat: Oropharynx is clear and moist. No oropharyngeal exudate.   Eyes: Conjunctivae and EOM are normal. Pupils are equal, round, and reactive to light. No scleral icterus.   Neck: No thyromegaly present.   Cardiovascular: Normal heart sounds and intact distal pulses.   Regular tachycardia   Pulmonary/Chest: Breath sounds normal. No respiratory distress. She has no wheezes. She has no rales.   Abdominal: Soft. Bowel sounds are normal. She exhibits no distension and no mass. There is no rebound and no guarding.   pain with light palpation diffusely. Not  present on deep stethoscope exam in any quadrant   Musculoskeletal: She exhibits no edema or tenderness.   Lymphadenopathy:     She has no cervical adenopathy.   Neurological:   Somnolent but will wake to light touch or voice and answer questions.    Skin: Skin is warm and dry.   Psychiatric:   Anxious appearing         ED Course   Procedures  Labs Reviewed   CBC W/ AUTO DIFFERENTIAL - Abnormal; Notable for the following:        Result Value    Platelets 463 (*)     Gran # (ANC) 8.4 (*)     Gran% 75.5 (*)     All other components within normal limits   COMPREHENSIVE METABOLIC PANEL - Abnormal; Notable for the following:     Sodium 134 (*)     Potassium 3.2 (*)     CO2 19 (*)     Glucose 170 (*)     BUN, Bld 36 (*)     Total Protein 8.9 (*)     Albumin 3.3 (*)     ALT 7 (*)     Anion Gap 18 (*)     eGFR if  50.2 (*)     eGFR if non  43.5 (*)     All other components within normal limits   LACTIC ACID, PLASMA - Abnormal; Notable for the following:     Lactate (Lactic Acid) 2.7 (*)     All other components within normal limits   POCT GLUCOSE - Abnormal; Notable for the following:     POCT Glucose 183 (*)     All other components within normal limits   TROPONIN I   B-TYPE NATRIURETIC PEPTIDE   LIPASE   CK   LIPASE    Narrative:     add on Lipase Order #[id number] per Dr Tian @ 06/01/2018  10:55    BETA - HYDROXYBUTYRATE, SERUM   LACTIC ACID, PLASMA   TROPONIN I   BETA - HYDROXYBUTYRATE, SERUM   URINALYSIS, REFLEX TO URINE CULTURE   POCT GLUCOSE MONITORING CONTINUOUS   POCT GLUCOSE MONITORING CONTINUOUS             Medical Decision Making:   History:   Old Medical Records: I decided to obtain old medical records.  Initial Assessment:   52 F with T2DM, CAD, PVD comes in with N/V/D.  Differential Diagnosis:   Gastroenteritis, acute MI, pancreatitis, ischemic bowel  Clinical Tests:   Lab Tests: Ordered and Reviewed  Radiological Study: Ordered and Reviewed  Medical Tests: Ordered and  Reviewed  ED Management:  Given IVF and zofran for nausea  Checking CBC, CMP, Lipase, Lactate, Trop, BNP, CK   CXR, EKG  Will likely perform multiple troponin draw for rule out       APC / Resident Notes:   11:03 AM  Reassessed patient. She is tearful, telling me that she has been depressed 2/2 passing of her nephew and burden of making his final arrangements. She feels she had a nervous breakdown previously for which she was started on SSRI during previous admission and she feels many of her symptoms are secondary to this. She denies any SI/HI, hallucinations. Nausea improved.       11:35 AM  Patient having no abdominal pain or tenderness on re-exam. Nausea improved with zofran.   Patient with elevated lactic acid and ROSLYN. Will continue fluid resuscitation and repeat lactic acid and troponin.    Patient signed out to me by Dr. Tian at 12p. Pending medical decision making with Dr. Lambert. Patient presenting with n./v, abdominal pain. H/o PVD. Abdominal exam showed pain out of proportion. lactate elevated 2.7. Will hydrate and repeat lactate at 1:45. CTA negative. Dr. Lambert would like to bring in for n/v and renal injury. Will admit to  obs team.     Courtney Centeno PA-C       Scribe Attestation:   Scribe #1: I performed the above scribed service and the documentation accurately describes the services I performed. I attest to the accuracy of the note.    Attending Attestation:   Physician Attestation Statement for Resident:  As the supervising MD   Physician Attestation Statement: I have personally seen and examined this patient.   I agree with the above history. -: N/v for a day and a half. She has lots of issues with stress. Her and her  are not sure if it's physical or stress related. She had chest pain this morning. Her hx is complicated with hx of MI and peripheral artery disease. Her exam is benign except that she is tachycardic. Will do abdominal pain workup and reassess.   As the supervising MD  I agree with the above PE.    As the supervising MD I agree with the above treatment, course, plan, and disposition.                       Clinical Impression:   The primary encounter diagnosis was Injury of kidney, unspecified laterality, initial encounter. Diagnoses of Chest pain, Tachycardia, and Nausea and vomiting, intractability of vomiting not specified, unspecified vomiting type were also pertinent to this visit.    CTA Abdomen   Final Result      1. No findings to suggest ischemia as clinically questioned.  Please see above for vascular details.   2. Nonspecific lobular enhancing focus within the a patent parenchyma, differential provided above.   3. Suspected leiomyomatous uterus, and several additional findings above.         Electronically signed by: Agustin Barba MD   Date:    06/01/2018   Time:    14:00      X-Ray Chest 1 View   Final Result      Please see above.         Electronically signed by: Tameka Yousif MD   Date:    06/01/2018   Time:    10:33          Disposition:   Disposition: Placed in Observation  Condition: Stable                        Courtney Centeno PA-C  06/01/18 2695

## 2018-06-01 NOTE — PROVIDER PROGRESS NOTES - EMERGENCY DEPT.
Encounter Date: 6/1/2018    ED Physician Progress Notes         EKG - STEMI Decision  Initial Reading: No STEMI present.    I, Heidi Ramirez, am scribing for, and in the presence of, Dr. Lambert. I performed the above scribed service and the documentation accurately describes the services I performed. I attest to the accuracy of the note.

## 2018-06-01 NOTE — ED NOTES
"Called to patient room by family, patient found in family members arms on the floor with incr resp rate noted, states "she passed out"  responds to sternal rub.Tu=448, Max assist to room chair then to VENANCIO alexandre at bedside. To room 1 with bedside report.   "

## 2018-06-01 NOTE — ASSESSMENT & PLAN NOTE
Bereavement   CAROLIN  - Likely contributing to above  - Recent admission 5/5-5/7 for sx suspected to be 2/2 anxiety d/t recent loss of nephew: extensive w/u including CTH, MRI and EEG that were all WNL; evaluated by psychiatry who rec starting lexapro, vistaril PRN anxiety, and outpatient counseling  - Continue home medications  - Has not gone to outpatient counseling yet; encouraged follow up

## 2018-06-01 NOTE — SUBJECTIVE & OBJECTIVE
Past Medical History:   Diagnosis Date    Diabetes mellitus     type 2    GERD (gastroesophageal reflux disease)     Hyperlipemia     Hypertension     Myocardial infarction 2010    minor-caused by stress per pt.       Past Surgical History:   Procedure Laterality Date    Angiogram - Right Extremity Right 7/9/15    angiogram-left leg  10/6/15       Review of patient's allergies indicates:   Allergen Reactions    Pcn [penicillins]      rash       No current facility-administered medications on file prior to encounter.      Current Outpatient Prescriptions on File Prior to Encounter   Medication Sig    aspirin 325 MG tablet Take 1 tablet (325 mg total) by mouth once daily.    atorvastatin (LIPITOR) 40 MG tablet Take 1 tablet (40 mg total) by mouth once daily.    blood sugar diagnostic Strp test  blood glucose 2 (two) times daily with meals.    blood-glucose meter Misc use as directed    carvedilol (COREG) 6.25 MG tablet Take 1 tablet (6.25 mg total) by mouth 2 (two) times daily with meals.    clopidogrel (PLAVIX) 75 mg tablet Take 1 tablet (75 mg total) by mouth once daily.    ergocalciferol (ERGOCALCIFEROL) 50,000 unit Cap Take 1 capsule (50,000 Units total) by mouth every 7 days.    escitalopram oxalate (LEXAPRO) 10 MG tablet Take 1 tablet (10 mg total) by mouth once daily.    famotidine (PEPCID) 20 MG tablet Take 1 tablet (20 mg total) by mouth 2 (two) times daily.    gabapentin (NEURONTIN) 300 MG capsule Take 1 capsule (300 mg total) by mouth 3 (three) times daily.    hydrOXYzine pamoate (VISTARIL) 50 MG Cap Take 1 capsule (50 mg total) by mouth every 6 (six) hours as needed (anxiety).    insulin aspart U-100 (NOVOLOG) 100 unit/mL injection Inject 9 Units into the skin 3 (three) times daily with meals.    insulin glargine (LANTUS SOLOSTAR) 100 unit/mL (3 mL) InPn pen Inject 50 Units into the skin every evening.    insulin syringe-needle U-100 0.3 mL 30 gauge x 5/16 Syrg 1 each by  "Misc.(Non-Drug; Combo Route) route 2 (two) times daily with meals.    lancets 30 gauge Misc use to test blood glucose 2 (two) times daily with meals.    lisinopril (PRINIVIL,ZESTRIL) 40 MG tablet Take 1 tablet (40 mg total) by mouth once daily.    pen needle, diabetic 32 gauge x 5/32" Ndle 1 each by Misc.(Non-Drug; Combo Route) route 2 (two) times daily with meals.    [DISCONTINUED] lancing device Misc 1 Device by Misc.(Non-Drug; Combo Route) route 2 (two) times daily with meals.     Family History     None        Social History Main Topics    Smoking status: Never Smoker    Smokeless tobacco: Never Used    Alcohol use No    Drug use: Yes     Types: Marijuana      Comment: used yesterday    Sexual activity: Not on file     Review of Systems   Constitutional: Positive for fatigue. Negative for chills, diaphoresis and fever.   HENT: Negative for congestion, hearing loss, sinus pressure and sore throat.    Eyes: Negative for photophobia and visual disturbance.   Respiratory: Negative for cough, chest tightness, shortness of breath and wheezing.    Cardiovascular: Positive for chest pain (resolved). Negative for palpitations and leg swelling.   Gastrointestinal: Positive for nausea and vomiting. Negative for abdominal distention, abdominal pain and diarrhea.        "indigestion"   Endocrine: Negative for cold intolerance and heat intolerance.   Genitourinary: Negative for difficulty urinating, dysuria, flank pain, frequency and hematuria.   Musculoskeletal: Negative for back pain, gait problem, myalgias and neck pain.   Skin: Negative for rash and wound.   Allergic/Immunologic: Negative for immunocompromised state.   Neurological: Negative for dizziness, syncope, speech difficulty, weakness and headaches.   Hematological: Negative for adenopathy. Does not bruise/bleed easily.   Psychiatric/Behavioral: Negative for agitation, confusion, dysphoric mood and suicidal ideas. The patient is nervous/anxious.         " Depression      Objective:     Vital Signs (Most Recent):  Temp: 98.3 °F (36.8 °C) (06/01/18 1516)  Pulse: (!) 112 (06/01/18 1616)  Resp: 16 (06/01/18 1616)  BP: (!) 174/99 (06/01/18 1616)  SpO2: 100 % (06/01/18 1616) Vital Signs (24h Range):  Temp:  [98.3 °F (36.8 °C)-98.4 °F (36.9 °C)] 98.3 °F (36.8 °C)  Pulse:  [109-123] 112  Resp:  [13-22] 16  SpO2:  [98 %-100 %] 100 %  BP: (107-181)/(72-99) 174/99     Weight: 96.2 kg (212 lb)  Body mass index is 33.71 kg/m².    Physical Exam   Constitutional: She is oriented to person, place, and time. She appears well-developed and well-nourished. No distress.   HENT:   Head: Normocephalic and atraumatic.   Eyes: Conjunctivae and EOM are normal. Right eye exhibits no discharge. Left eye exhibits no discharge. No scleral icterus.   Neck: Normal range of motion. Neck supple. No tracheal deviation present.   Cardiovascular: Regular rhythm, normal heart sounds and intact distal pulses.  Tachycardia present.    Pulmonary/Chest: Effort normal and breath sounds normal. No respiratory distress. She has no wheezes.   Abdominal: Soft. Bowel sounds are normal. She exhibits no distension. There is no tenderness.   Musculoskeletal: Normal range of motion. She exhibits no edema or tenderness.   Neurological: She is alert and oriented to person, place, and time. No cranial nerve deficit.   Skin: Skin is warm and dry. She is not diaphoretic. No erythema.   Psychiatric: She has a normal mood and affect. Her behavior is normal.   Endorses anxiety and depression; denies SI/HI/AVH         CRANIAL NERVES     CN III, IV, VI   Extraocular motions are normal.        Significant Labs: All pertinent labs within the past 24 hours have been reviewed.    Significant Imaging: I have reviewed all pertinent imaging results/findings within the past 24 hours.

## 2018-06-01 NOTE — HPI
"52 year old female with a PMHx of HTN, uncontrolled DM2, PVD s/p stents presenting to the ER with  at bedside c/o N/V x2 days. Pt reports acute onset of vomiting that has persisted over the past 2 days. Pt reports decreased appetite with limited intake at home. Pt endorses "drinking water but it keeps coming up." Pt denies associated abdominal pain. Pt reports intermittent episodes of chest "heaviness" that occur at rest "in the center of my chest"; no radiation or association with exertion/exercise or SOB. Pt also endorses indigestion exacerbated by vomiting. At the time of my exam, pt denies CP, SOB, abdominal pain, diarrhea, blood in emesis, urinary complaints, weakness, speech/vision changes, fever/chills, recent illness/antibiotic use. Pt does endorse continued anxiety, depression, and difficulty processing the death of her nephew; denies SI/HI. Pt and  both state they think symptoms are related to significant stress/anxiety.     Per ER note, patient initially seen in intake when she had an episode of unresponsiveness. No shaking movements, tongue biting or incontinence. Per , these are the patient's "seizures" which she has occasionally during stressful situations and which he attributes to her anxiety. Of note, recent admission had extensive w/u including CTH, MRI, and EEG that were all unremarkable.      In the ED, HDS with hypertension and tachycardia (improved with IVF). Afebrile without leukocytosis. Labs remarkable for K 3.2, BUN 36, Cr 1.4 (BL 0.8), and . Lactate 2.7->1.4 s/p IVF. CPK, Troponin, BNP, lipase, and beta hydroxy WNL. EKG shows sinus tachycardia (); no ischemic changes noted. CTA without evidence for ischemia.  "

## 2018-06-01 NOTE — ED NOTES
LOC: The patient is awake, alert and aware of environment with an appropriate affect, the patient is oriented x 3 and speaking appropriately.  APPEARANCE: Patient resting comfortably and in no acute distress, patient is clean and well groomed, patient's clothing is properly fastened.  SKIN: The skin is warm and dry, color consistent with ethnicity, patient has normal skin turgor and moist mucus membranes, skin intact, no breakdown or bruising noted.  MUSCULOSKELETAL: Patient moving all extremities spontaneously, no obvious swelling or deformities noted.  RESPIRATORY: Airway is open and patent, respirations are spontaneous, patient has a normal effort and rate, no accessory muscle use noted, bilateral breath sounds **.  CARDIAC: Patient has  Sinus tachy and regular rhythm, no periphreal edema noted, capillary refill < 3 seconds.  ABDOMEN: Soft and non tender to palpation, no distention noted, normoactive bowel sounds present in all four quadrants. Pt c/o nausea/vomiting and diarrhea for 2 days.  NEUROLOGIC:  facial expression is symmetrical, patient moving all extremities spontaneously, normal sensation in all extremities when touched with a finger.  Follows all commands appropriately.     Pt coming from room 1, HL intact. Bolus of fluids complete.

## 2018-06-02 VITALS
HEART RATE: 93 BPM | DIASTOLIC BLOOD PRESSURE: 92 MMHG | TEMPERATURE: 98 F | OXYGEN SATURATION: 96 % | SYSTOLIC BLOOD PRESSURE: 161 MMHG | HEIGHT: 66 IN | WEIGHT: 193.56 LBS | RESPIRATION RATE: 18 BRPM | BODY MASS INDEX: 31.11 KG/M2

## 2018-06-02 PROBLEM — N30.01 ACUTE CYSTITIS WITH HEMATURIA: Status: ACTIVE | Noted: 2018-06-02

## 2018-06-02 LAB
ALBUMIN SERPL BCP-MCNC: 2.6 G/DL
ALP SERPL-CCNC: 111 U/L
ALT SERPL W/O P-5'-P-CCNC: 6 U/L
ANION GAP SERPL CALC-SCNC: 10 MMOL/L
AST SERPL-CCNC: 12 U/L
BASOPHILS # BLD AUTO: 0.04 K/UL
BASOPHILS NFR BLD: 0.6 %
BILIRUB SERPL-MCNC: 0.9 MG/DL
BUN SERPL-MCNC: 22 MG/DL
CALCIUM SERPL-MCNC: 9.3 MG/DL
CHLORIDE SERPL-SCNC: 106 MMOL/L
CO2 SERPL-SCNC: 21 MMOL/L
CREAT SERPL-MCNC: 0.9 MG/DL
DIFFERENTIAL METHOD: ABNORMAL
EOSINOPHIL # BLD AUTO: 0 K/UL
EOSINOPHIL NFR BLD: 0.3 %
ERYTHROCYTE [DISTWIDTH] IN BLOOD BY AUTOMATED COUNT: 12.9 %
EST. GFR  (AFRICAN AMERICAN): >60 ML/MIN/1.73 M^2
EST. GFR  (NON AFRICAN AMERICAN): >60 ML/MIN/1.73 M^2
GLUCOSE SERPL-MCNC: 69 MG/DL
HCT VFR BLD AUTO: 41.9 %
HGB BLD-MCNC: 13.7 G/DL
IMM GRANULOCYTES # BLD AUTO: 0.01 K/UL
IMM GRANULOCYTES NFR BLD AUTO: 0.1 %
LYMPHOCYTES # BLD AUTO: 2.8 K/UL
LYMPHOCYTES NFR BLD: 40 %
MAGNESIUM SERPL-MCNC: 2 MG/DL
MCH RBC QN AUTO: 31 PG
MCHC RBC AUTO-ENTMCNC: 32.7 G/DL
MCV RBC AUTO: 95 FL
MONOCYTES # BLD AUTO: 0.6 K/UL
MONOCYTES NFR BLD: 8.8 %
NEUTROPHILS # BLD AUTO: 3.5 K/UL
NEUTROPHILS NFR BLD: 50.2 %
NRBC BLD-RTO: 0 /100 WBC
PHOSPHATE SERPL-MCNC: 2.8 MG/DL
PLATELET # BLD AUTO: 399 K/UL
PMV BLD AUTO: 10.6 FL
POCT GLUCOSE: 122 MG/DL (ref 70–110)
POCT GLUCOSE: 155 MG/DL (ref 70–110)
POTASSIUM SERPL-SCNC: 4.4 MMOL/L
PROT SERPL-MCNC: 7.5 G/DL
RBC # BLD AUTO: 4.42 M/UL
SODIUM SERPL-SCNC: 137 MMOL/L
WBC # BLD AUTO: 6.95 K/UL

## 2018-06-02 PROCEDURE — G0378 HOSPITAL OBSERVATION PER HR: HCPCS

## 2018-06-02 PROCEDURE — 36415 COLL VENOUS BLD VENIPUNCTURE: CPT

## 2018-06-02 PROCEDURE — 63600175 PHARM REV CODE 636 W HCPCS: Performed by: PHYSICIAN ASSISTANT

## 2018-06-02 PROCEDURE — 83735 ASSAY OF MAGNESIUM: CPT

## 2018-06-02 PROCEDURE — 25000003 PHARM REV CODE 250: Performed by: PHYSICIAN ASSISTANT

## 2018-06-02 PROCEDURE — 85025 COMPLETE CBC W/AUTO DIFF WBC: CPT

## 2018-06-02 PROCEDURE — 84100 ASSAY OF PHOSPHORUS: CPT

## 2018-06-02 PROCEDURE — 80053 COMPREHEN METABOLIC PANEL: CPT

## 2018-06-02 PROCEDURE — A4216 STERILE WATER/SALINE, 10 ML: HCPCS | Performed by: PHYSICIAN ASSISTANT

## 2018-06-02 PROCEDURE — 99225 PR SUBSEQUENT OBSERVATION CARE,LEVEL II: CPT | Mod: ,,, | Performed by: PHYSICIAN ASSISTANT

## 2018-06-02 RX ORDER — PROMETHAZINE HYDROCHLORIDE 12.5 MG/1
12.5 TABLET ORAL EVERY 6 HOURS PRN
Qty: 30 TABLET | Refills: 0 | Status: ON HOLD | OUTPATIENT
Start: 2018-06-02 | End: 2019-12-09 | Stop reason: HOSPADM

## 2018-06-02 RX ORDER — CEFTRIAXONE 1 G/1
1 INJECTION, POWDER, FOR SOLUTION INTRAMUSCULAR; INTRAVENOUS ONCE
Status: COMPLETED | OUTPATIENT
Start: 2018-06-02 | End: 2018-06-02

## 2018-06-02 RX ORDER — ONDANSETRON 8 MG/1
8 TABLET, ORALLY DISINTEGRATING ORAL EVERY 8 HOURS PRN
Qty: 30 TABLET | Refills: 1 | Status: ON HOLD | OUTPATIENT
Start: 2018-06-02 | End: 2019-12-09 | Stop reason: SDUPTHER

## 2018-06-02 RX ORDER — SULFAMETHOXAZOLE AND TRIMETHOPRIM 800; 160 MG/1; MG/1
1 TABLET ORAL 2 TIMES DAILY
Qty: 6 TABLET | Refills: 0 | Status: SHIPPED | OUTPATIENT
Start: 2018-06-03 | End: 2018-06-06

## 2018-06-02 RX ADMIN — GABAPENTIN 300 MG: 300 CAPSULE ORAL at 09:06

## 2018-06-02 RX ADMIN — SODIUM CHLORIDE, PRESERVATIVE FREE 3 ML: 5 INJECTION INTRAVENOUS at 12:06

## 2018-06-02 RX ADMIN — ASPIRIN 325 MG ORAL TABLET 325 MG: 325 PILL ORAL at 09:06

## 2018-06-02 RX ADMIN — ESCITALOPRAM OXALATE 10 MG: 10 TABLET ORAL at 09:06

## 2018-06-02 RX ADMIN — CEFTRIAXONE SODIUM 1 G: 1 INJECTION, POWDER, FOR SOLUTION INTRAMUSCULAR; INTRAVENOUS at 12:06

## 2018-06-02 RX ADMIN — FAMOTIDINE 20 MG: 20 TABLET ORAL at 09:06

## 2018-06-02 RX ADMIN — CLOPIDOGREL 75 MG: 75 TABLET, FILM COATED ORAL at 09:06

## 2018-06-02 RX ADMIN — SODIUM CHLORIDE: 0.9 INJECTION, SOLUTION INTRAVENOUS at 04:06

## 2018-06-02 RX ADMIN — CARVEDILOL 6.25 MG: 6.25 TABLET, FILM COATED ORAL at 09:06

## 2018-06-02 NOTE — ASSESSMENT & PLAN NOTE
- UA with 39 WBC and many bacteria  - Gave IV CTX x1  - Pt discharged home on Bactrim. Will f/u urine culture.

## 2018-06-02 NOTE — PLAN OF CARE
Problem: Patient Care Overview  Goal: Plan of Care Review  Patient admitted to room 902A at 6:45 pm alert and no nausea reported at that time. Verbalizes needs moves all extremities well.  at bedside. No fall or occurrence noted. Report to on coming nurse. Assessment on going.

## 2018-06-02 NOTE — NURSING
Pt vitals stable, IV taken out, discharge instructions explained and educated to pt and her family and ordered wheelchair for pt.  She left floor with transporter on wheelchair with her .  Pt has left the floor.

## 2018-06-02 NOTE — PLAN OF CARE
Problem: Fall Risk (Adult)  Goal: Absence of Falls  Patient will demonstrate the desired outcomes by discharge/transition of care.   Outcome: Outcome(s) achieved Date Met: 06/02/18 06/02/18 1342   Fall Risk (Adult)   Absence of Falls achieves outcome       Problem: Patient Care Overview  Goal: Plan of Care Review  Outcome: Outcome(s) achieved Date Met: 06/02/18  Pt alert and oriented x 4.  Stated that nausea symptoms subsided and was able to get a tray and ate breakfast.  Pt's vitals stable, and ready for discharge.  Pt remained free of falls and was given discharge instructions.  Nothing significant to report.     Problem: Nausea/Vomiting (Adult)  Goal: Symptom Relief  Patient will demonstrate the desired outcomes by discharge/transition of care.   Outcome: Outcome(s) achieved Date Met: 06/02/18 06/02/18 1342   Nausea/Vomiting (Adult)   Symptom Relief achieves outcome

## 2018-06-02 NOTE — HOSPITAL COURSE
Pt admitted to observation for ROSLYN in setting of GI losses;suspect exacerbated by emotional stress/anxiety. Recent admission for similar sx and started on SSRI. Lactate 2.7->1.4 s/p IVF. ROSLYN resolved with IVF. UA indicative of UTI; gave IV CTX x1. Pt discharged home on Bactrim. Will f/u urine culture.

## 2018-06-02 NOTE — ASSESSMENT & PLAN NOTE
Dehydration   In setting of GI losses; suspect exacerbated by emotional stress/anxiety  - N/V resolved on admission  - HDS on admission with tachycardia likely d/t dehydration  - Afebrile without leukocytosis  - Cr 1.4 on admission  - Lactate 2.7->1.4 s/p IVF  - Received 2L in the ED  - Cr 1.4->0.9 s/p IVF  - Discharged with rx for antiemetics

## 2018-06-02 NOTE — ASSESSMENT & PLAN NOTE
-  on arrival; recent A1c >14  - Home regimen: Lantus 50 qhs, Novolog 9U TIDWM  - Hospital regimen: Levemir 7U BID, mod dose SSI  - BGs stable during admission

## 2018-06-02 NOTE — DISCHARGE SUMMARY
"[facility] Hospital Medicine  Discharge Summary      Patient Name: Suyapa Connelly  MRN: [id number]  Admission Date: 6/1/2018  Hospital Length of Stay: 0 days  Discharge Date and Time: 6/2/2018  1:38 PM  Attending Physician: No att. providers found   Discharging Provider: Janessa Luther PA-C  Primary Care Provider: Erlinda Rahman NP  Hospital Medicine Team: [facility] F Janessa Luther PA-C    HPI:   52 year old female with a PMHx of HTN, uncontrolled DM2, PVD s/p stents presenting to the ER with  at bedside c/o N/V x2 days. Pt reports acute onset of vomiting that has persisted over the past 2 days. Pt reports decreased appetite with limited intake at home. Pt endorses "drinking water but it keeps coming up." Pt denies associated abdominal pain. Pt reports intermittent episodes of chest "heaviness" that occur at rest "in the center of my chest"; no radiation or association with exertion/exercise or SOB. Pt also endorses indigestion exacerbated by vomiting. At the time of my exam, pt denies CP, SOB, abdominal pain, diarrhea, blood in emesis, urinary complaints, weakness, speech/vision changes, fever/chills, recent illness/antibiotic use. Pt does endorse continued anxiety, depression, and difficulty processing the death of her nephew; denies SI/HI. Pt and  both state they think symptoms are related to significant stress/anxiety.     Per ER note, patient initially seen in intake when she had an episode of unresponsiveness. No shaking movements, tongue biting or incontinence. Per , these are the patient's "seizures" which she has occasionally during stressful situations and which he attributes to her anxiety. Of note, recent admission had extensive w/u including CTH, MRI, and EEG that were all unremarkable.      In the ED, HDS with hypertension and tachycardia (improved with IVF). Afebrile without leukocytosis. Labs remarkable for K 3.2, BUN 36, Cr 1.4 (BL 0.8), and . " Lactate 2.7->1.4 s/p IVF. CPK, Troponin, BNP, lipase, and beta hydroxy WNL. EKG shows sinus tachycardia (); no ischemic changes noted. CTA without evidence for ischemia.    * No surgery found *      Hospital Course:   Pt admitted to observation for ROSLYN in setting of GI losses;suspect exacerbated by emotional stress/anxiety. Recent admission for similar sx and started on SSRI. Lactate 2.7->1.4 s/p IVF. ROSLYN resolved with IVF. UA indicative of UTI; gave IV CTX x1. Pt discharged home on Bactrim. Will f/u urine culture.        * ROSLYN (acute kidney injury)    Dehydration   In setting of GI losses; suspect exacerbated by emotional stress/anxiety  - N/V resolved on admission  - HDS on admission with tachycardia likely d/t dehydration  - Afebrile without leukocytosis  - Cr 1.4 on admission  - Lactate 2.7->1.4 s/p IVF  - Received 2L in the ED  - Cr 1.4->0.9 s/p IVF  - Discharged with rx for antiemetics        Acute cystitis with hematuria    - UA with 39 WBC and many bacteria  - Gave IV CTX x1  - Pt discharged home on Bactrim. Will f/u urine culture.         Panic attack as reaction to stress    Bereavement   CAROLIN  - Likely contributing to above  - Recent admission 5/5-5/7 for sx suspected to be 2/2 anxiety d/t recent loss of nephew: extensive w/u including CTH, MRI and EEG that were all WNL; evaluated by psychiatry who rec starting lexapro, vistaril PRN anxiety, and outpatient counseling  - Continue home medications  - Has not gone to outpatient counseling yet; encouraged follow up        Hypokalemia, gastrointestinal losses    - K 3.2 on admit -> 4.4 s/p replacement        Essential hypertension    - Continue home coreg 6.25 mg BID  - Lisinopril held for ROSLYN; resumed on discharge as ROSLYN resolved        Type 2 diabetes mellitus without complication, with long-term current use of insulin    -  on arrival; recent A1c >14  - Home regimen: Lantus 50 qhs, Novolog 9U TIDWM  - Hospital regimen: Levemir 7U BID, mod dose  SSI  - BGs stable during admission        Peripheral artery disease    - Continue home  and plavix        Vitamin D deficiency    - Continue home supplementation qweek (Sunday)          Final Active Diagnoses:    Diagnosis Date Noted POA    PRINCIPAL PROBLEM:  ROSLYN (acute kidney injury) [N17.9] 07/07/2015 Yes    Acute cystitis with hematuria [N30.01] 06/02/2018 Yes    Panic attack as reaction to stress [F41.0, F43.0] 05/05/2018 Yes    Hypokalemia, gastrointestinal losses [E87.6] 05/05/2018 Yes    Type 2 diabetes mellitus without complication, with long-term current use of insulin [E11.9, Z79.4] 05/05/2018 Not Applicable    Essential hypertension [I10] 05/05/2018 Yes    Peripheral artery disease [I73.9]  Yes    Vitamin D deficiency [E55.9] 05/06/2018 Yes    Dehydration [E86.0] 06/01/2018 Yes    CAROLIN (generalized anxiety disorder) [F41.1] 05/07/2018 Yes     Chronic    Bereavement [Z63.4] 05/07/2018 Not Applicable      Problems Resolved During this Admission:    Diagnosis Date Noted Date Resolved POA       Discharged Condition: stable    Disposition: Home or Self Care    Follow Up:  Follow-up Information     Call Erlinda Rahman NP.    Specialty:  Family Medicine  Contact information:  [address] [facility] [address] [phone]                 Patient Instructions:     Activity as tolerated     Notify your health care provider if you experience any of the following:  temperature >100.4     Notify your health care provider if you experience any of the following:  persistent nausea and vomiting or diarrhea     Notify your health care provider if you experience any of the following:  severe uncontrolled pain     Notify your health care provider if you experience any of the following:  redness, tenderness, or signs of infection (pain, swelling, redness, odor or green/yellow discharge around incision site)         Significant Diagnostic Studies: Labs:   Troponin   Recent Labs  Lab  06/01/18  1253   TROPONINI 0.014     Microbiology: Urine Culture  No results found for: LABURIN  Radiology: X-Ray: CXR: X-Ray Chest 1 View (CXR):   Results for orders placed or performed during the hospital encounter of 06/01/18   X-Ray Chest 1 View    Narrative    EXAMINATION:  XR CHEST 1 VIEW    CLINICAL HISTORY:  evaluate for free air;    TECHNIQUE:  Single frontal view of the chest was performed.    COMPARISON:  05/05/2018.    FINDINGS:  External leads overlie the image.    Mediastinal structures are midline.  Cardiac silhouette and pulmonary vascular distribution appear unremarkable.    Lung volumes are normal and symmetric for bedside technique.  I detect no pulmonary disease, pleural fluid, lymph node enlargement, cardiac decompensation, pneumothorax, pneumomediastinum, pneumoperitoneum or significant osseous abnormality.    Please note that chest CT would provide more sensitive assessment for pneumomediastinum or pneumoperitoneum in this patient with repeated episodes of vomiting.      Impression    Please see above.      Electronically signed by: Tameka Yousif MD  Date:    06/01/2018  Time:    10:33   .  CT scan: CTA Abdomen    Pending Diagnostic Studies:     None         Medications:  Reconciled Home Medications:      Medication List      START taking these medications    ondansetron 8 MG Tbdl  Commonly known as:  ZOFRAN-ODT  Take 1 tablet (8 mg total) by mouth every 8 (eight) hours as needed.     promethazine 12.5 MG Tab  Commonly known as:  PHENERGAN  Take 1 tablet (12.5 mg total) by mouth every 6 (six) hours as needed (nausea).     sulfamethoxazole-trimethoprim 800-160mg 800-160 mg Tab  Commonly known as:  BACTRIM DS  Take 1 tablet by mouth 2 (two) times daily.  Start taking on:  6/3/2018        CONTINUE taking these medications    aspirin 325 MG tablet  Take 1 tablet (325 mg total) by mouth once daily.     atorvastatin 40 MG tablet  Commonly known as:  LIPITOR  Take 1 tablet (40 mg total) by mouth  "once daily.     BASAGLAR KWIKPEN U-100 INSULIN 100 unit/mL (3 mL) Inpn pen  Generic drug:  insulin glargine  Inject 50 Units into the skin every evening.     carvedilol 6.25 MG tablet  Commonly known as:  COREG  Take 1 tablet (6.25 mg total) by mouth 2 (two) times daily with meals.     clopidogrel 75 mg tablet  Commonly known as:  PLAVIX  Take 1 tablet (75 mg total) by mouth once daily.     escitalopram oxalate 10 MG tablet  Commonly known as:  LEXAPRO  Take 1 tablet (10 mg total) by mouth once daily.     famotidine 20 MG tablet  Commonly known as:  PEPCID  Take 1 tablet (20 mg total) by mouth 2 (two) times daily.     gabapentin 300 MG capsule  Commonly known as:  NEURONTIN  Take 1 capsule (300 mg total) by mouth 3 (three) times daily.     hydrOXYzine pamoate 50 MG Cap  Commonly known as:  VISTARIL  Take 1 capsule (50 mg total) by mouth every 6 (six) hours as needed (anxiety).     insulin syringe-needle U-100 0.3 mL 30 gauge x 5/16 Syrg  1 each by Misc.(Non-Drug; Combo Route) route 2 (two) times daily with meals.     lisinopril 40 MG tablet  Commonly known as:  PRINIVIL,ZESTRIL  Take 1 tablet (40 mg total) by mouth once daily.     NovoLOG U-100 Insulin aspart 100 unit/mL injection  Generic drug:  insulin aspart U-100  Inject 9 Units into the skin 3 (three) times daily with meals.     ONETOUCH DELICA LANCETS 30 gauge Misc  Generic drug:  lancets  use to test blood glucose 2 (two) times daily with meals.     ONETOUCH VERIO Strp  Generic drug:  blood sugar diagnostic  test  blood glucose 2 (two) times daily with meals.     ONETOUCH VERIO SYSTEM Misc  Generic drug:  blood-glucose meter  use as directed     pen needle, diabetic 32 gauge x 5/32" Ndle  1 each by Misc.(Non-Drug; Combo Route) route 2 (two) times daily with meals.     VITAMIN D2 50,000 unit Cap  Generic drug:  ergocalciferol  Take 1 capsule (50,000 Units total) by mouth every 7 days.            Indwelling Lines/Drains at time of discharge: "   Lines/Drains/Airways          No matching active lines, drains, or airways          Time spent on the discharge of patient: 30 minutes  Patient was seen and examined on the date of discharge and determined to be suitable for discharge.         Janessa Luther PA-C  Department of Hospital Medicine  [facility]  Discussed with staff: Dr. Kennedy

## 2018-06-02 NOTE — PLAN OF CARE
Problem: Patient Care Overview  Goal: Plan of Care Review  Outcome: Ongoing (interventions implemented as appropriate)  Pt remains free of falls and injury. Pt makes statement of no pain. Pt provided with PRN antiemetic with positive results. Spouse remained in room overnight. Bed low and locked, Call light within reach.

## 2018-06-02 NOTE — ASSESSMENT & PLAN NOTE
- Continue home coreg 6.25 mg BID  - Lisinopril held for ROSLYN; resumed on discharge as ROSLYN resolved

## 2018-06-03 LAB — BACTERIA UR CULT: NORMAL

## 2018-06-06 RX ORDER — PEN NEEDLE, DIABETIC 30 GX3/16"
1 NEEDLE, DISPOSABLE MISCELLANEOUS 2 TIMES DAILY WITH MEALS
Qty: 100 EACH | Refills: 11 | Status: CANCELLED | OUTPATIENT
Start: 2018-05-07

## 2018-06-06 RX ORDER — PEN NEEDLE, DIABETIC 29 G X1/2"
1 NEEDLE, DISPOSABLE MISCELLANEOUS 2 TIMES DAILY WITH MEALS
Qty: 100 EACH | Refills: 11 | Status: CANCELLED | OUTPATIENT
Start: 2018-05-07

## 2018-06-19 RX ORDER — INSULIN ASPART 100 [IU]/ML
9 INJECTION, SOLUTION INTRAVENOUS; SUBCUTANEOUS
Qty: 10 ML | Refills: 0 | Status: CANCELLED | OUTPATIENT
Start: 2018-06-19 | End: 2019-06-19

## 2018-06-19 RX ORDER — INSULIN GLARGINE 100 [IU]/ML
50 INJECTION, SOLUTION SUBCUTANEOUS NIGHTLY
Qty: 15 ML | Refills: 0 | Status: CANCELLED | OUTPATIENT
Start: 2018-06-19 | End: 2018-12-16

## 2018-06-19 RX ORDER — ERGOCALCIFEROL 1.25 MG/1
50000 CAPSULE ORAL
Qty: 4 CAPSULE | Refills: 0 | Status: CANCELLED | OUTPATIENT
Start: 2018-06-19 | End: 2018-07-19

## 2018-06-22 RX ORDER — ERGOCALCIFEROL 1.25 MG/1
50000 CAPSULE ORAL
Qty: 4 CAPSULE | Refills: 0 | Status: CANCELLED | OUTPATIENT
Start: 2018-06-19 | End: 2018-07-19

## 2018-06-22 RX ORDER — INSULIN ASPART 100 [IU]/ML
9 INJECTION, SOLUTION INTRAVENOUS; SUBCUTANEOUS
Qty: 10 ML | Refills: 0 | Status: CANCELLED | OUTPATIENT
Start: 2018-06-19 | End: 2019-06-19

## 2018-06-22 RX ORDER — INSULIN GLARGINE 100 [IU]/ML
50 INJECTION, SOLUTION SUBCUTANEOUS NIGHTLY
Qty: 15 ML | Refills: 0 | Status: CANCELLED | OUTPATIENT
Start: 2018-06-19 | End: 2018-12-16

## 2018-07-02 RX ORDER — ERGOCALCIFEROL 1.25 MG/1
50000 CAPSULE ORAL
Qty: 4 CAPSULE | Refills: 0 | Status: CANCELLED | OUTPATIENT
Start: 2018-06-19 | End: 2018-07-19

## 2018-07-02 RX ORDER — INSULIN ASPART 100 [IU]/ML
9 INJECTION, SOLUTION INTRAVENOUS; SUBCUTANEOUS
Qty: 10 ML | Refills: 0 | Status: CANCELLED | OUTPATIENT
Start: 2018-06-19 | End: 2019-06-19

## 2018-07-02 RX ORDER — INSULIN GLARGINE 100 [IU]/ML
50 INJECTION, SOLUTION SUBCUTANEOUS NIGHTLY
Qty: 15 ML | Refills: 0 | Status: CANCELLED | OUTPATIENT
Start: 2018-06-19 | End: 2018-12-16

## 2018-07-05 RX ORDER — INSULIN GLARGINE 100 [IU]/ML
50 INJECTION, SOLUTION SUBCUTANEOUS NIGHTLY
Qty: 15 ML | Refills: 0 | Status: CANCELLED | OUTPATIENT
Start: 2018-06-19 | End: 2018-12-16

## 2018-07-05 RX ORDER — INSULIN ASPART 100 [IU]/ML
9 INJECTION, SOLUTION INTRAVENOUS; SUBCUTANEOUS
Qty: 10 ML | Refills: 0 | Status: CANCELLED | OUTPATIENT
Start: 2018-06-19 | End: 2019-06-19

## 2018-07-05 RX ORDER — ERGOCALCIFEROL 1.25 MG/1
50000 CAPSULE ORAL
Qty: 4 CAPSULE | Refills: 0 | Status: CANCELLED | OUTPATIENT
Start: 2018-06-19 | End: 2018-07-19

## 2018-07-06 RX ORDER — INSULIN ASPART 100 [IU]/ML
9 INJECTION, SOLUTION INTRAVENOUS; SUBCUTANEOUS
Qty: 10 ML | Refills: 0 | Status: CANCELLED | OUTPATIENT
Start: 2018-06-19 | End: 2019-06-19

## 2018-07-06 RX ORDER — INSULIN GLARGINE 100 [IU]/ML
50 INJECTION, SOLUTION SUBCUTANEOUS NIGHTLY
Qty: 15 ML | Refills: 0 | Status: CANCELLED | OUTPATIENT
Start: 2018-06-19 | End: 2018-12-16

## 2018-07-06 RX ORDER — ERGOCALCIFEROL 1.25 MG/1
50000 CAPSULE ORAL
Qty: 4 CAPSULE | Refills: 0 | Status: CANCELLED | OUTPATIENT
Start: 2018-06-19 | End: 2018-07-19

## 2018-07-17 RX ORDER — ERGOCALCIFEROL 1.25 MG/1
50000 CAPSULE ORAL
Qty: 4 CAPSULE | Refills: 0 | Status: CANCELLED | OUTPATIENT
Start: 2018-06-19 | End: 2018-07-19

## 2018-07-17 RX ORDER — INSULIN ASPART 100 [IU]/ML
9 INJECTION, SOLUTION INTRAVENOUS; SUBCUTANEOUS
Qty: 10 ML | Refills: 0 | Status: CANCELLED | OUTPATIENT
Start: 2018-06-19 | End: 2019-06-19

## 2018-07-17 RX ORDER — INSULIN GLARGINE 100 [IU]/ML
50 INJECTION, SOLUTION SUBCUTANEOUS NIGHTLY
Qty: 15 ML | Refills: 0 | Status: CANCELLED | OUTPATIENT
Start: 2018-06-19 | End: 2018-12-16

## 2018-07-19 ENCOUNTER — HOSPITAL ENCOUNTER (OUTPATIENT)
Facility: HOSPITAL | Age: 53
Discharge: HOME | End: 2018-07-21
Attending: EMERGENCY MEDICINE | Admitting: HOSPITALIST
Payer: MEDICAID

## 2018-07-19 DIAGNOSIS — R07.9 CHEST PAIN: ICD-10-CM

## 2018-07-19 DIAGNOSIS — F33.1 MDD (MAJOR DEPRESSIVE DISORDER), RECURRENT EPISODE, MODERATE: Chronic | ICD-10-CM

## 2018-07-19 DIAGNOSIS — R55 SYNCOPE: Primary | ICD-10-CM

## 2018-07-19 LAB
ALBUMIN SERPL BCP-MCNC: 3 G/DL
ALP SERPL-CCNC: 128 U/L
ALT SERPL W/O P-5'-P-CCNC: 8 U/L
ANION GAP SERPL CALC-SCNC: 16 MMOL/L
AST SERPL-CCNC: 12 U/L
BASOPHILS # BLD AUTO: 0.02 K/UL
BASOPHILS NFR BLD: 0.3 %
BILIRUB SERPL-MCNC: 0.6 MG/DL
BNP SERPL-MCNC: 15 PG/ML
BUN SERPL-MCNC: 13 MG/DL
CALCIUM SERPL-MCNC: 10.3 MG/DL
CHLORIDE SERPL-SCNC: 101 MMOL/L
CO2 SERPL-SCNC: 20 MMOL/L
CREAT SERPL-MCNC: 0.9 MG/DL
DIFFERENTIAL METHOD: ABNORMAL
EOSINOPHIL # BLD AUTO: 0 K/UL
EOSINOPHIL NFR BLD: 0.3 %
ERYTHROCYTE [DISTWIDTH] IN BLOOD BY AUTOMATED COUNT: 12.4 %
EST. GFR  (AFRICAN AMERICAN): >60 ML/MIN/1.73 M^2
EST. GFR  (NON AFRICAN AMERICAN): >60 ML/MIN/1.73 M^2
GLUCOSE SERPL-MCNC: 323 MG/DL
HCT VFR BLD AUTO: 41.1 %
HGB BLD-MCNC: 13.9 G/DL
IMM GRANULOCYTES # BLD AUTO: 0.03 K/UL
IMM GRANULOCYTES NFR BLD AUTO: 0.4 %
LYMPHOCYTES # BLD AUTO: 1 K/UL
LYMPHOCYTES NFR BLD: 12.8 %
MCH RBC QN AUTO: 30.8 PG
MCHC RBC AUTO-ENTMCNC: 33.8 G/DL
MCV RBC AUTO: 91 FL
MONOCYTES # BLD AUTO: 0.2 K/UL
MONOCYTES NFR BLD: 2.6 %
NEUTROPHILS # BLD AUTO: 6.4 K/UL
NEUTROPHILS NFR BLD: 83.6 %
NRBC BLD-RTO: 0 /100 WBC
PLATELET # BLD AUTO: 333 K/UL
PMV BLD AUTO: 11.1 FL
POTASSIUM SERPL-SCNC: 3.4 MMOL/L
PROT SERPL-MCNC: 8.7 G/DL
RBC # BLD AUTO: 4.51 M/UL
SODIUM SERPL-SCNC: 137 MMOL/L
TROPONIN I SERPL DL<=0.01 NG/ML-MCNC: <0.006 NG/ML
TROPONIN I SERPL DL<=0.01 NG/ML-MCNC: <0.006 NG/ML
WBC # BLD AUTO: 7.63 K/UL

## 2018-07-19 PROCEDURE — 93010 ELECTROCARDIOGRAM REPORT: CPT | Mod: ,,, | Performed by: INTERNAL MEDICINE

## 2018-07-19 PROCEDURE — 93005 ELECTROCARDIOGRAM TRACING: CPT

## 2018-07-19 PROCEDURE — 80053 COMPREHEN METABOLIC PANEL: CPT

## 2018-07-19 PROCEDURE — 83880 ASSAY OF NATRIURETIC PEPTIDE: CPT

## 2018-07-19 PROCEDURE — 99285 EMERGENCY DEPT VISIT HI MDM: CPT | Mod: 25

## 2018-07-19 PROCEDURE — 99284 EMERGENCY DEPT VISIT MOD MDM: CPT | Mod: ,,, | Performed by: EMERGENCY MEDICINE

## 2018-07-19 PROCEDURE — G0378 HOSPITAL OBSERVATION PER HR: HCPCS

## 2018-07-19 PROCEDURE — 96375 TX/PRO/DX INJ NEW DRUG ADDON: CPT

## 2018-07-19 PROCEDURE — 84484 ASSAY OF TROPONIN QUANT: CPT

## 2018-07-19 PROCEDURE — 25000003 PHARM REV CODE 250: Performed by: EMERGENCY MEDICINE

## 2018-07-19 PROCEDURE — 83690 ASSAY OF LIPASE: CPT

## 2018-07-19 PROCEDURE — 63600175 PHARM REV CODE 636 W HCPCS: Performed by: STUDENT IN AN ORGANIZED HEALTH CARE EDUCATION/TRAINING PROGRAM

## 2018-07-19 PROCEDURE — 85025 COMPLETE CBC W/AUTO DIFF WBC: CPT

## 2018-07-19 PROCEDURE — 99220 PR INITIAL OBSERVATION CARE,LEVL III: CPT | Mod: ,,, | Performed by: PHYSICIAN ASSISTANT

## 2018-07-19 PROCEDURE — 96374 THER/PROPH/DIAG INJ IV PUSH: CPT

## 2018-07-19 RX ORDER — LABETALOL HYDROCHLORIDE 5 MG/ML
10 INJECTION, SOLUTION INTRAVENOUS
Status: COMPLETED | OUTPATIENT
Start: 2018-07-19 | End: 2018-07-19

## 2018-07-19 RX ORDER — ONDANSETRON 2 MG/ML
4 INJECTION INTRAMUSCULAR; INTRAVENOUS ONCE
Status: COMPLETED | OUTPATIENT
Start: 2018-07-19 | End: 2018-07-19

## 2018-07-19 RX ADMIN — LABETALOL HYDROCHLORIDE 10 MG: 5 INJECTION, SOLUTION INTRAVENOUS at 11:07

## 2018-07-19 RX ADMIN — ONDANSETRON 4 MG: 2 INJECTION, SOLUTION INTRAMUSCULAR; INTRAVENOUS at 08:07

## 2018-07-20 PROBLEM — R11.2 NON-INTRACTABLE VOMITING WITH NAUSEA: Status: ACTIVE | Noted: 2018-07-20

## 2018-07-20 PROBLEM — I16.0 HYPERTENSIVE URGENCY: Status: ACTIVE | Noted: 2018-07-20

## 2018-07-20 LAB
ANION GAP SERPL CALC-SCNC: 13 MMOL/L
B-HCG UR QL: NEGATIVE
B-OH-BUTYR BLD STRIP-SCNC: 0.3 MMOL/L
BACTERIA #/AREA URNS AUTO: ABNORMAL /HPF
BASOPHILS # BLD AUTO: 0.01 K/UL
BASOPHILS NFR BLD: 0.1 %
BILIRUB UR QL STRIP: NEGATIVE
BUN SERPL-MCNC: 16 MG/DL
CALCIUM SERPL-MCNC: 9.4 MG/DL
CHLORIDE SERPL-SCNC: 101 MMOL/L
CLARITY UR REFRACT.AUTO: ABNORMAL
CO2 SERPL-SCNC: 22 MMOL/L
COLOR UR AUTO: YELLOW
CREAT SERPL-MCNC: 0.9 MG/DL
DIFFERENTIAL METHOD: ABNORMAL
EOSINOPHIL # BLD AUTO: 0 K/UL
EOSINOPHIL NFR BLD: 0 %
ERYTHROCYTE [DISTWIDTH] IN BLOOD BY AUTOMATED COUNT: 12.2 %
EST. GFR  (AFRICAN AMERICAN): >60 ML/MIN/1.73 M^2
EST. GFR  (NON AFRICAN AMERICAN): >60 ML/MIN/1.73 M^2
ESTIMATED AVG GLUCOSE: 246 MG/DL
GLUCOSE SERPL-MCNC: 359 MG/DL
GLUCOSE UR QL STRIP: ABNORMAL
HBA1C MFR BLD HPLC: 10.2 %
HCT VFR BLD AUTO: 38.3 %
HGB BLD-MCNC: 12.9 G/DL
HGB UR QL STRIP: ABNORMAL
HYALINE CASTS UR QL AUTO: 2 /LPF
IMM GRANULOCYTES # BLD AUTO: 0.02 K/UL
IMM GRANULOCYTES NFR BLD AUTO: 0.2 %
KETONES UR QL STRIP: ABNORMAL
LEUKOCYTE ESTERASE UR QL STRIP: NEGATIVE
LIPASE SERPL-CCNC: 12 U/L
LYMPHOCYTES # BLD AUTO: 0.8 K/UL
LYMPHOCYTES NFR BLD: 8.7 %
MAGNESIUM SERPL-MCNC: 1.3 MG/DL
MCH RBC QN AUTO: 30.6 PG
MCHC RBC AUTO-ENTMCNC: 33.7 G/DL
MCV RBC AUTO: 91 FL
MICROSCOPIC COMMENT: ABNORMAL
MONOCYTES # BLD AUTO: 0.3 K/UL
MONOCYTES NFR BLD: 2.9 %
NEUTROPHILS # BLD AUTO: 7.7 K/UL
NEUTROPHILS NFR BLD: 88.1 %
NITRITE UR QL STRIP: NEGATIVE
NRBC BLD-RTO: 0 /100 WBC
PH UR STRIP: 6 [PH] (ref 5–8)
PHOSPHATE SERPL-MCNC: 3.8 MG/DL
PLATELET # BLD AUTO: 367 K/UL
PMV BLD AUTO: 10.2 FL
POCT GLUCOSE: 219 MG/DL (ref 70–110)
POCT GLUCOSE: 237 MG/DL (ref 70–110)
POCT GLUCOSE: 243 MG/DL (ref 70–110)
POCT GLUCOSE: 316 MG/DL (ref 70–110)
POCT GLUCOSE: 330 MG/DL (ref 70–110)
POCT GLUCOSE: 336 MG/DL (ref 70–110)
POTASSIUM SERPL-SCNC: 4 MMOL/L
PROT UR QL STRIP: ABNORMAL
RBC # BLD AUTO: 4.21 M/UL
RBC #/AREA URNS AUTO: 36 /HPF (ref 0–4)
SODIUM SERPL-SCNC: 136 MMOL/L
SP GR UR STRIP: 1.02 (ref 1–1.03)
SQUAMOUS #/AREA URNS AUTO: 15 /HPF
URN SPEC COLLECT METH UR: ABNORMAL
UROBILINOGEN UR STRIP-ACNC: NEGATIVE EU/DL
WBC # BLD AUTO: 8.71 K/UL
WBC #/AREA URNS AUTO: 2 /HPF (ref 0–5)
YEAST UR QL AUTO: ABNORMAL

## 2018-07-20 PROCEDURE — 97161 PT EVAL LOW COMPLEX 20 MIN: CPT

## 2018-07-20 PROCEDURE — G8979 MOBILITY GOAL STATUS: HCPCS | Mod: CH

## 2018-07-20 PROCEDURE — G8980 MOBILITY D/C STATUS: HCPCS | Mod: CI

## 2018-07-20 PROCEDURE — G0378 HOSPITAL OBSERVATION PER HR: HCPCS

## 2018-07-20 PROCEDURE — 81001 URINALYSIS AUTO W/SCOPE: CPT

## 2018-07-20 PROCEDURE — G8978 MOBILITY CURRENT STATUS: HCPCS | Mod: CI

## 2018-07-20 PROCEDURE — 80048 BASIC METABOLIC PNL TOTAL CA: CPT

## 2018-07-20 PROCEDURE — 83735 ASSAY OF MAGNESIUM: CPT

## 2018-07-20 PROCEDURE — 63600175 PHARM REV CODE 636 W HCPCS: Performed by: INTERNAL MEDICINE

## 2018-07-20 PROCEDURE — 85025 COMPLETE CBC W/AUTO DIFF WBC: CPT

## 2018-07-20 PROCEDURE — 81025 URINE PREGNANCY TEST: CPT

## 2018-07-20 PROCEDURE — 97530 THERAPEUTIC ACTIVITIES: CPT

## 2018-07-20 PROCEDURE — 25000003 PHARM REV CODE 250: Performed by: PHYSICIAN ASSISTANT

## 2018-07-20 PROCEDURE — 36415 COLL VENOUS BLD VENIPUNCTURE: CPT

## 2018-07-20 PROCEDURE — 63600175 PHARM REV CODE 636 W HCPCS: Performed by: PHYSICIAN ASSISTANT

## 2018-07-20 PROCEDURE — 83036 HEMOGLOBIN GLYCOSYLATED A1C: CPT

## 2018-07-20 PROCEDURE — 82010 KETONE BODYS QUAN: CPT

## 2018-07-20 PROCEDURE — 84100 ASSAY OF PHOSPHORUS: CPT

## 2018-07-20 RX ORDER — PROMETHAZINE HYDROCHLORIDE 25 MG/1
25 TABLET ORAL EVERY 6 HOURS PRN
Status: DISCONTINUED | OUTPATIENT
Start: 2018-07-20 | End: 2018-07-20

## 2018-07-20 RX ORDER — ONDANSETRON 4 MG/1
4 TABLET, ORALLY DISINTEGRATING ORAL EVERY 8 HOURS PRN
Status: DISCONTINUED | OUTPATIENT
Start: 2018-07-20 | End: 2018-07-21 | Stop reason: HOSPADM

## 2018-07-20 RX ORDER — ESCITALOPRAM OXALATE 10 MG/1
10 TABLET ORAL DAILY
Status: DISCONTINUED | OUTPATIENT
Start: 2018-07-20 | End: 2018-07-21 | Stop reason: HOSPADM

## 2018-07-20 RX ORDER — AMOXICILLIN 250 MG
1 CAPSULE ORAL 2 TIMES DAILY PRN
Status: DISCONTINUED | OUTPATIENT
Start: 2018-07-20 | End: 2018-07-21 | Stop reason: HOSPADM

## 2018-07-20 RX ORDER — INSULIN ASPART 100 [IU]/ML
0-5 INJECTION, SOLUTION INTRAVENOUS; SUBCUTANEOUS EVERY 6 HOURS PRN
Status: DISCONTINUED | OUTPATIENT
Start: 2018-07-20 | End: 2018-07-21 | Stop reason: HOSPADM

## 2018-07-20 RX ORDER — CLOPIDOGREL BISULFATE 75 MG/1
75 TABLET ORAL DAILY
Status: DISCONTINUED | OUTPATIENT
Start: 2018-07-20 | End: 2018-07-21 | Stop reason: HOSPADM

## 2018-07-20 RX ORDER — ENOXAPARIN SODIUM 100 MG/ML
40 INJECTION SUBCUTANEOUS EVERY 24 HOURS
Status: DISCONTINUED | OUTPATIENT
Start: 2018-07-20 | End: 2018-07-21 | Stop reason: HOSPADM

## 2018-07-20 RX ORDER — ASPIRIN 325 MG
325 TABLET ORAL DAILY
Status: DISCONTINUED | OUTPATIENT
Start: 2018-07-20 | End: 2018-07-21 | Stop reason: HOSPADM

## 2018-07-20 RX ORDER — PROMETHAZINE HYDROCHLORIDE 25 MG/1
25 SUPPOSITORY RECTAL EVERY 6 HOURS PRN
Status: DISCONTINUED | OUTPATIENT
Start: 2018-07-20 | End: 2018-07-21 | Stop reason: HOSPADM

## 2018-07-20 RX ORDER — METOCLOPRAMIDE HYDROCHLORIDE 5 MG/ML
10 INJECTION INTRAMUSCULAR; INTRAVENOUS ONCE
Status: COMPLETED | OUTPATIENT
Start: 2018-07-20 | End: 2018-07-20

## 2018-07-20 RX ORDER — METOCLOPRAMIDE 5 MG/1
5 TABLET ORAL
Status: DISCONTINUED | OUTPATIENT
Start: 2018-07-21 | End: 2018-07-21 | Stop reason: HOSPADM

## 2018-07-20 RX ORDER — HYDROXYZINE PAMOATE 25 MG/1
50 CAPSULE ORAL EVERY 6 HOURS PRN
Status: DISCONTINUED | OUTPATIENT
Start: 2018-07-20 | End: 2018-07-21 | Stop reason: HOSPADM

## 2018-07-20 RX ORDER — LISINOPRIL 20 MG/1
40 TABLET ORAL DAILY
Status: DISCONTINUED | OUTPATIENT
Start: 2018-07-20 | End: 2018-07-21 | Stop reason: HOSPADM

## 2018-07-20 RX ORDER — ATORVASTATIN CALCIUM 10 MG/1
40 TABLET, FILM COATED ORAL DAILY
Status: DISCONTINUED | OUTPATIENT
Start: 2018-07-20 | End: 2018-07-21 | Stop reason: HOSPADM

## 2018-07-20 RX ORDER — SODIUM CHLORIDE 0.9 % (FLUSH) 0.9 %
5 SYRINGE (ML) INJECTION
Status: DISCONTINUED | OUTPATIENT
Start: 2018-07-20 | End: 2018-07-21 | Stop reason: HOSPADM

## 2018-07-20 RX ORDER — GABAPENTIN 300 MG/1
300 CAPSULE ORAL 3 TIMES DAILY
Status: DISCONTINUED | OUTPATIENT
Start: 2018-07-20 | End: 2018-07-21 | Stop reason: HOSPADM

## 2018-07-20 RX ORDER — GLUCAGON 1 MG
1 KIT INJECTION
Status: DISCONTINUED | OUTPATIENT
Start: 2018-07-20 | End: 2018-07-21 | Stop reason: HOSPADM

## 2018-07-20 RX ORDER — SIMETHICONE 80 MG
1 TABLET,CHEWABLE ORAL 3 TIMES DAILY PRN
Status: DISCONTINUED | OUTPATIENT
Start: 2018-07-20 | End: 2018-07-21 | Stop reason: HOSPADM

## 2018-07-20 RX ORDER — IPRATROPIUM BROMIDE AND ALBUTEROL SULFATE 2.5; .5 MG/3ML; MG/3ML
3 SOLUTION RESPIRATORY (INHALATION) EVERY 4 HOURS PRN
Status: DISCONTINUED | OUTPATIENT
Start: 2018-07-20 | End: 2018-07-21 | Stop reason: HOSPADM

## 2018-07-20 RX ORDER — IBUPROFEN 200 MG
24 TABLET ORAL
Status: DISCONTINUED | OUTPATIENT
Start: 2018-07-20 | End: 2018-07-21 | Stop reason: HOSPADM

## 2018-07-20 RX ORDER — IBUPROFEN 200 MG
16 TABLET ORAL
Status: DISCONTINUED | OUTPATIENT
Start: 2018-07-20 | End: 2018-07-21 | Stop reason: HOSPADM

## 2018-07-20 RX ORDER — ACETAMINOPHEN 325 MG/1
650 TABLET ORAL EVERY 4 HOURS PRN
Status: DISCONTINUED | OUTPATIENT
Start: 2018-07-20 | End: 2018-07-21 | Stop reason: HOSPADM

## 2018-07-20 RX ORDER — RAMELTEON 8 MG/1
8 TABLET ORAL NIGHTLY PRN
Status: DISCONTINUED | OUTPATIENT
Start: 2018-07-20 | End: 2018-07-21 | Stop reason: HOSPADM

## 2018-07-20 RX ORDER — METOCLOPRAMIDE 10 MG/1
10 TABLET ORAL ONCE
Status: DISCONTINUED | OUTPATIENT
Start: 2018-07-20 | End: 2018-07-20

## 2018-07-20 RX ORDER — KETOROLAC TROMETHAMINE 30 MG/ML
15 INJECTION, SOLUTION INTRAMUSCULAR; INTRAVENOUS EVERY 6 HOURS PRN
Status: DISCONTINUED | OUTPATIENT
Start: 2018-07-20 | End: 2018-07-21 | Stop reason: HOSPADM

## 2018-07-20 RX ORDER — FAMOTIDINE 20 MG/1
20 TABLET, FILM COATED ORAL 2 TIMES DAILY
Status: DISCONTINUED | OUTPATIENT
Start: 2018-07-20 | End: 2018-07-21 | Stop reason: HOSPADM

## 2018-07-20 RX ORDER — CARVEDILOL 6.25 MG/1
6.25 TABLET ORAL 2 TIMES DAILY WITH MEALS
Status: DISCONTINUED | OUTPATIENT
Start: 2018-07-20 | End: 2018-07-21 | Stop reason: HOSPADM

## 2018-07-20 RX ORDER — HYDRALAZINE HYDROCHLORIDE 25 MG/1
50 TABLET, FILM COATED ORAL EVERY 8 HOURS PRN
Status: DISCONTINUED | OUTPATIENT
Start: 2018-07-20 | End: 2018-07-21 | Stop reason: HOSPADM

## 2018-07-20 RX ORDER — SODIUM CHLORIDE 9 MG/ML
INJECTION, SOLUTION INTRAVENOUS CONTINUOUS
Status: ACTIVE | OUTPATIENT
Start: 2018-07-20 | End: 2018-07-20

## 2018-07-20 RX ADMIN — LISINOPRIL 40 MG: 20 TABLET ORAL at 10:07

## 2018-07-20 RX ADMIN — CLOPIDOGREL 75 MG: 75 TABLET, FILM COATED ORAL at 10:07

## 2018-07-20 RX ADMIN — INSULIN ASPART 4 UNITS: 100 INJECTION, SOLUTION INTRAVENOUS; SUBCUTANEOUS at 06:07

## 2018-07-20 RX ADMIN — INSULIN ASPART 4 UNITS: 100 INJECTION, SOLUTION INTRAVENOUS; SUBCUTANEOUS at 10:07

## 2018-07-20 RX ADMIN — ENOXAPARIN SODIUM 40 MG: 100 INJECTION SUBCUTANEOUS at 05:07

## 2018-07-20 RX ADMIN — METOCLOPRAMIDE 10 MG: 5 INJECTION, SOLUTION INTRAMUSCULAR; INTRAVENOUS at 03:07

## 2018-07-20 RX ADMIN — GABAPENTIN 300 MG: 300 CAPSULE ORAL at 10:07

## 2018-07-20 RX ADMIN — INSULIN DETEMIR 25 UNITS: 100 INJECTION, SOLUTION SUBCUTANEOUS at 03:07

## 2018-07-20 RX ADMIN — POTASSIUM BICARBONATE 50 MEQ: 25 TABLET, EFFERVESCENT ORAL at 03:07

## 2018-07-20 RX ADMIN — CARVEDILOL 6.25 MG: 6.25 TABLET, FILM COATED ORAL at 05:07

## 2018-07-20 RX ADMIN — SODIUM CHLORIDE 1000 ML: 0.9 INJECTION, SOLUTION INTRAVENOUS at 03:07

## 2018-07-20 RX ADMIN — GABAPENTIN 300 MG: 300 CAPSULE ORAL at 09:07

## 2018-07-20 RX ADMIN — FAMOTIDINE 20 MG: 20 TABLET ORAL at 09:07

## 2018-07-20 RX ADMIN — INSULIN ASPART 2 UNITS: 100 INJECTION, SOLUTION INTRAVENOUS; SUBCUTANEOUS at 02:07

## 2018-07-20 RX ADMIN — ATORVASTATIN CALCIUM 40 MG: 20 TABLET, FILM COATED ORAL at 10:07

## 2018-07-20 RX ADMIN — SODIUM CHLORIDE: 0.9 INJECTION, SOLUTION INTRAVENOUS at 04:07

## 2018-07-20 RX ADMIN — ESCITALOPRAM OXALATE 10 MG: 10 TABLET ORAL at 10:07

## 2018-07-20 RX ADMIN — CARVEDILOL 6.25 MG: 6.25 TABLET, FILM COATED ORAL at 03:07

## 2018-07-20 RX ADMIN — INSULIN ASPART 2 UNITS: 100 INJECTION, SOLUTION INTRAVENOUS; SUBCUTANEOUS at 03:07

## 2018-07-20 RX ADMIN — INSULIN DETEMIR 26 UNITS: 100 INJECTION, SOLUTION SUBCUTANEOUS at 09:07

## 2018-07-20 RX ADMIN — INSULIN ASPART 1 UNITS: 100 INJECTION, SOLUTION INTRAVENOUS; SUBCUTANEOUS at 09:07

## 2018-07-20 RX ADMIN — FAMOTIDINE 20 MG: 20 TABLET ORAL at 10:07

## 2018-07-20 RX ADMIN — ASPIRIN 325 MG ORAL TABLET 325 MG: 325 PILL ORAL at 10:07

## 2018-07-20 RX ADMIN — GABAPENTIN 300 MG: 300 CAPSULE ORAL at 02:07

## 2018-07-20 NOTE — PLAN OF CARE
Problem: Patient Care Overview  Goal: Plan of Care Review  Outcome: Ongoing (interventions implemented as appropriate)  Pt aaox4, vs stable, no falls or injuries. Fall precautions in place w/ personal items near by. No Pain or nausea verbalized. No signs of infection or distress. Worked with PT. Call light in reach; will continue to monitor pt.    C-diff percautions D/C per

## 2018-07-20 NOTE — ASSESSMENT & PLAN NOTE
Essential hypertension  - BP max 204 in ED. Given labetalol 10 mg IV and BP responded.  Patient has been noncompliant with home meds in setting of N/V.   - c/w coreg 6.25 mg PO BID, lisinopril 40 mg PO daily

## 2018-07-20 NOTE — ED NOTES
LOC: The patient is awake, but somewhat lethargic; oriented to place, time, situation. Speech is delayed but appropriate.     APPEARANCE: Patient resting on stretcher; appears uncomfortable.  Patient is clean and well groomed.    SKIN: The skin is cool and clammy; color consistent with ethnicity.  Patient has normal skin turgor but dry mucus membranes.  Skin intact; no breakdown or bruising noted.     MUSCULOSKELETAL: Patient moving upper and lower extremities without difficulty, but complains of generalized weakness.     RESPIRATORY: Airway is open and patent. Respirations spontaneous, even, though pt is tacypneic.  Patient has a normal effort and rate.  No accessory muscle use noted. Denies cough.     CARDIAC:  NSR with a HR in the upper 90's noted.  No peripheral edema noted. No complaints of chest pain.      ABDOMEN: Soft and non tender to palpation but complains of mid upper abdominal discomfort.   No distention noted.     NEUROLOGIC: Eyes open spontaneously though pt is having frequent episodes of unresponsiveness lasting approximately 10-15 seconds.  Follows commands; facial expression symmetrical.  Purposeful motor response noted; normal sensation in all extremities though she complains of numbness in the left upper arm radiating down into the left forearm.

## 2018-07-20 NOTE — PT/OT/SLP EVAL
"Physical Therapy Evaluation    Patient Name:  Suyapa Connelly   MRN:  [id number]    Recommendations:     Discharge Recommendations:  home   Discharge Equipment Recommendations: none   Barriers to discharge: Inaccessible home and Decreased caregiver support 12 NASIR with B UE rails; son works    Assessment:     Suyapa Connelly is a 52 y.o. female admitted with a medical diagnosis of Non-intractable vomiting with nausea.  She presents with the following impairments/functional limitations:  weakness, gait instability . Pt is motivated to progress with functional mobility. Limited mobility due to weakness and limited to gait in room due to isolation. PT will need to assess pt's ability to go up/down steps when pt able to leave her room.    Rehab Prognosis:  good; patient would benefit from acute skilled PT services to address these deficits and reach maximum level of function.      Recent Surgery: * No surgery found *      Plan:     During this hospitalization, patient to be seen 3 x/week to address the above listed problems via gait training, therapeutic exercises  · Plan of Care Expires:  08/19/18   Plan of Care Reviewed with: patient, son    Subjective     Communicated with nurse prior to session.  Patient found supine upon PT entry to room, agreeable to evaluation.    "I get weak sometimes after I take all my pills. 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  Patient has the following equipment: none.  Upon discharge, patient " will have assistance from son when he is not working.    Objective:     Patient found with: peripheral IV     General Precautions: Standard, fall   Orthopedic Precautions:N/A   Braces: N/A     Exams:  · Cognitive Exam:  Patient is oriented to Person, Place, Time and Situation and follows 100% of multistep commands   · Postural Exam:  Patient presented with the following abnormalities:    · -       Rounded shoulders  · -       Forward head  · Sensation:    · -       Intact  light/touch B UE/LE; pt c/o numbness in her lateral L arm but states it is better than it was  · RUE ROM: WFL  · RUE Strength: WFL  · LUE ROM: WFL  · LUE Strength: WFL  · RLE ROM: WFL  · RLE Strength: WFL  · LLE ROM: WFL  · LLE Strength: WFL    Functional Mobility:  · Bed Mobility:     · Supine to Sit: modified independence  · Sit to Supine: modified independence  · Transfers:     · Sit to Stand:  supervision with no AD  · Gait: 30ft with no AD with SBA then marched in place x 12 reps then 15 reps then 10 reps with no AD with CGA . pt rested in sitting in between and c/o fatigue and had to rest after 1st trial of 12 reps.  Pt sat on the EOB between trials ~ 15 min with no instability noted  AM-PAC 6 CLICK MOBILITY  Total Score:22     Patient left supine with all lines intact, call button in reach and son present.    GOALS:    Physical Therapy Goals        Problem: Physical Therapy Goal    Goal Priority Disciplines Outcome Goal Variances Interventions   Physical Therapy Goal     PT/OT, PT Ongoing (interventions implemented as appropriate)     Description:  PT goals until 7/25/18    1. Pt sit to stand with no AD with supervision-not met  2. Pt to perform gait 250ft with no AD withsupervision.-not met  3. Pt to up/down 10 steps with R rail with SBA.-not met  4. Pt to perform B LE exs in sitting or supine x 20 reps to strengthen B LE to improve functional mobility.-not met                      History:     Past Medical History:   Diagnosis Date     Anxiety     Depression     Diabetes mellitus     type 2    GERD (gastroesophageal reflux disease)     Hyperlipemia     Hypertension     Myocardial infarction 2010    minor-caused by stress per pt.       Past Surgical History:   Procedure Laterality Date    Angiogram - Right Extremity Right 7/9/15    angiogram-left leg  10/6/15       Clinical Decision Making:     History  Co-morbidities and personal factors that may impact the plan of care Examination  Body Structures and Functions, activity limitations and participation restrictions that may impact the plan of care Clinical Presentation   Decision Making/ Complexity Score   Co-morbidities:   [] Time since onset of injury / illness / exacerbation  [] Status of current condition  []Patient's cognitive status and safety concerns    [] Multiple Medical Problems (see med hx)  Personal Factors:   [] Patient's age  [] Prior Level of function   [x] Patient's home situation (environment and family support)  [] Patient's level of motivation  [] Expected progression of patient      HISTORY:(criteria)    [] 55071 - no personal factors/history    [x] 61131 - has 1-2 personal factor/comorbidity     [] 18939 - has >3 personal factor/comorbidity     Body Regions:  [] Objective examination findings  [] Head     []  Neck  [] Trunk   [] Upper Extremity  [] Lower Extremity    Body Systems:  [] For communication ability, affect, cognition, language, and learning style: the assessment of the ability to make needs known, consciousness, orientation (person, place, and time), expected emotional /behavioral responses, and learning preferences (eg, learning barriers, education  needs)  [x] For the neuromuscular system: a general assessment of gross coordinated movement (eg, balance, gait, locomotion, transfers, and transitions) and motor function  (motor control and motor learning)  [] For the musculoskeletal system: the assessment of gross symmetry, gross range of motion, gross  strength, height, and weight  [] For the integumentary system: the assessment of pliability(texture), presence of scar formation, skin color, and skin integrity  [x] For cardiovascular/pulmonary system: the assessment of heart rate, respiratory rate, blood pressure, and edema     Activity limitations:    [] Patient's cognitive status and saf ety concerns          [] Status of current condition      [] Weight bearing restriction  [] Cardiopulmunary Restriction    Participation Restrictions:   [] Goals and goal agreement with the patient     [] Rehab potential (prognosis) and probable outcome      Examination of Body System: (criteria)    [x] 27481 - addressing 1-2 elements    [] 34992 - addressing a total of 3 or more elements     [] 47800 -  Addressing a total of 4 or more elements         Clinical Presentation: (criteria)  Stable - 27785     On examination of body system using standardized tests and measures patient presents with (CHOOSE ONE) elements from any of the following: body structures and functions, activity limitations, and/or participation restrictions.  Leading to a clinical presentation that is considered (CHOOSE ONE)                              Clinical Decision Making  (Eval Complexity):  Low- 59777     Time Tracking:     PT Received On: 07/20/18  PT Start Time: 1150     PT Stop Time: 1215  PT Total Time (min): 25 min     Billable Minutes: Evaluation 15 and Therapeutic Activity 10      Jenna Gerardo, PT  07/20/2018

## 2018-07-20 NOTE — ED NOTES
Pt resting quietly on stretcher; remains on continuous cardiac and pulse ox monitoring with non-invasive blood pressure continues to cycle every 15 minutes.  Blood pressure remains elevated but improving; NSR noted.  Pt reporting relief in nausea.  Bed locked in lowest position; side rails up and locked x 2; call light, bedside table, and personal belongings within reach.  Pt denies needs or complaints at this time; will continue to monitor pt.

## 2018-07-20 NOTE — SUBJECTIVE & OBJECTIVE
Past Medical History:   Diagnosis Date    Anxiety     Depression     Diabetes mellitus     type 2    GERD (gastroesophageal reflux disease)     Hyperlipemia     Hypertension     Myocardial infarction 2010    minor-caused by stress per pt.       Past Surgical History:   Procedure Laterality Date    Angiogram - Right Extremity Right 7/9/15    angiogram-left leg  10/6/15       Review of patient's allergies indicates:   Allergen Reactions    Pcn [penicillins]      rash       No current facility-administered medications on file prior to encounter.      Current Outpatient Prescriptions on File Prior to Encounter   Medication Sig    aspirin 325 MG tablet Take 1 tablet (325 mg total) by mouth once daily.    atorvastatin (LIPITOR) 40 MG tablet Take 1 tablet (40 mg total) by mouth once daily.    blood sugar diagnostic Strp test  blood glucose 2 (two) times daily with meals.    blood-glucose meter Misc use as directed    carvedilol (COREG) 6.25 MG tablet Take 1 tablet (6.25 mg total) by mouth 2 (two) times daily with meals.    clopidogrel (PLAVIX) 75 mg tablet Take 1 tablet (75 mg total) by mouth once daily.    escitalopram oxalate (LEXAPRO) 10 MG tablet Take 1 tablet (10 mg total) by mouth once daily.    famotidine (PEPCID) 20 MG tablet Take 1 tablet (20 mg total) by mouth 2 (two) times daily.    gabapentin (NEURONTIN) 300 MG capsule Take 1 capsule (300 mg total) by mouth 3 (three) times daily.    hydrOXYzine pamoate (VISTARIL) 50 MG Cap Take 1 capsule (50 mg total) by mouth every 6 (six) hours as needed (anxiety).    insulin aspart U-100 (NOVOLOG) 100 unit/mL injection Inject 9 Units into the skin 3 (three) times daily with meals.    insulin glargine (LANTUS SOLOSTAR) 100 unit/mL (3 mL) InPn pen Inject 50 Units into the skin every evening.    insulin syringe-needle U-100 0.3 mL 30 gauge x 5/16 Syrg 1 each by Misc.(Non-Drug; Combo Route) route 2 (two) times daily with meals.    lancets 30 gauge Misc  "use to test blood glucose 2 (two) times daily with meals.    lisinopril (PRINIVIL,ZESTRIL) 40 MG tablet Take 1 tablet (40 mg total) by mouth once daily.    ondansetron (ZOFRAN-ODT) 8 MG TbDL Take 1 tablet (8 mg total) by mouth every 8 (eight) hours as needed.    pen needle, diabetic 32 gauge x 5/32" Ndle 1 each by Misc.(Non-Drug; Combo Route) route 2 (two) times daily with meals.    promethazine (PHENERGAN) 12.5 MG Tab Take 1 tablet (12.5 mg total) by mouth every 6 (six) hours as needed (nausea).    [DISCONTINUED] lancing device Misc 1 Device by Misc.(Non-Drug; Combo Route) route 2 (two) times daily with meals.     Family History     None        Social History Main Topics    Smoking status: Never Smoker    Smokeless tobacco: Never Used    Alcohol use No    Drug use: Yes     Types: Marijuana      Comment: used yesterday    Sexual activity: Not on file     Review of Systems   Constitutional: Positive for appetite change, diaphoresis (resolved) and fatigue (acute on chronic). Negative for chills and fever.   HENT: Negative for congestion and postnasal drip.    Eyes: Negative for photophobia and visual disturbance.   Respiratory: Negative for cough and shortness of breath.    Cardiovascular: Positive for chest pain (resolved). Negative for palpitations and leg swelling.   Gastrointestinal: Positive for abdominal pain (epigastric), diarrhea, nausea and vomiting. Negative for constipation.   Genitourinary: Negative for difficulty urinating, dysuria and flank pain.   Musculoskeletal: Positive for back pain (R lower back) and neck stiffness. Negative for gait problem, joint swelling and neck pain.   Skin: Negative for rash and wound.   Neurological: Positive for dizziness, syncope and weakness (generalized). Negative for headaches.   Psychiatric/Behavioral: Negative for confusion. The patient is nervous/anxious.      Objective:     Vital Signs (Most Recent):  Temp: 98.4 °F (36.9 °C) (07/20/18 0230)  Pulse: 99 " (07/20/18 0230)  Resp: 16 (07/20/18 0230)  BP: (!) 181/97 (MD Called) (07/20/18 0230)  SpO2: 98 % (07/20/18 0230) Vital Signs (24h Range):  Temp:  [97.4 °F (36.3 °C)-98.4 °F (36.9 °C)] 98.4 °F (36.9 °C)  Pulse:  [] 99  Resp:  [16-27] 16  SpO2:  [95 %-99 %] 98 %  BP: (154-207)/() 181/97     Weight: 96 kg (211 lb 10.3 oz)  Body mass index is 34.16 kg/m².    Physical Exam   Constitutional: She is oriented to person, place, and time. She appears well-developed and well-nourished. No distress.   Lethargic female in NAD with  at bedside, giving most of history   HENT:   Head: Normocephalic and atraumatic.   Eyes: Conjunctivae and EOM are normal. Pupils are equal, round, and reactive to light.   Neck: Normal range of motion. Neck supple.   Cardiovascular: Normal rate, regular rhythm, normal heart sounds and intact distal pulses.    No murmur heard.  Pulmonary/Chest: Effort normal and breath sounds normal. No respiratory distress. She has no wheezes.   Abdominal: Soft. She exhibits no distension. There is no tenderness.   + diminished bowel sounds  Obese abdomen   Musculoskeletal: Normal range of motion.   Neurological: She is alert and oriented to person, place, and time. No cranial nerve deficit.   Steady gait  No pronator drift  No gross focal deficit   Skin: Skin is warm and dry. She is not diaphoretic.   Psychiatric: She has a normal mood and affect. Her behavior is normal. Judgment and thought content normal.   Nursing note and vitals reviewed.        CRANIAL NERVES     CN III, IV, VI   Pupils are equal, round, and reactive to light.  Extraocular motions are normal.        Significant Labs:   CBC:   Recent Labs  Lab 07/19/18 1932   WBC 7.63   HGB 13.9   HCT 41.1        CMP:   Recent Labs  Lab 07/19/18 1932      K 3.4*      CO2 20*   *   BUN 13   CREATININE 0.9   CALCIUM 10.3   PROT 8.7*   ALBUMIN 3.0*   BILITOT 0.6   ALKPHOS 128   AST 12   ALT 8*   ANIONGAP 16   EGFRNONAA  >60.0     Lipase:   Recent Labs  Lab 07/19/18  2242   LIPASE 12     Troponin:   Recent Labs  Lab 07/19/18  1932 07/19/18  2242   TROPONINI <0.006 <0.006     Urine Studies:   Recent Labs  Lab 07/20/18  0011   COLORU Yellow   APPEARANCEUA Hazy*   PHUR 6.0   SPECGRAV 1.020   PROTEINUA 3+*   GLUCUA 3+*   KETONESU 1+*   BILIRUBINUA Negative   OCCULTUA 1+*   NITRITE Negative   UROBILINOGEN Negative   LEUKOCYTESUR Negative   RBCUA 36*   WBCUA 2   BACTERIA None   SQUAMEPITHEL 15   HYALINECASTS 2*       Significant Imaging: I have reviewed all pertinent imaging results/findings within the past 24 hours.

## 2018-07-20 NOTE — ASSESSMENT & PLAN NOTE
Stable  - c/w lexapro 10 mg PO daily, hydroxyzine PRN  - will need to f/u outpt with psych.    -  is very anxious and concerned on exam, but symptoms are not new.  Will need to discuss symptoms/history with patient directly, as  interjects.

## 2018-07-20 NOTE — ED TRIAGE NOTES
states pt had two episodes of passing out as well as two episodes of seizures today.  Pt has had abdominal pain since last night with multiple episodes of vomiting and diarrhea today.   states when pt passed out the second time she fell in the bathroom; unsure if pt hit her head.

## 2018-07-20 NOTE — PLAN OF CARE
Problem: Physical Therapy Goal  Goal: Physical Therapy Goal  PT goals until 7/25/18    1. Pt sit to stand with no AD with supervision-not met  2. Pt to perform gait 250ft with no AD withsupervision.-not met  3. Pt to up/down 10 steps with R rail with SBA.-not met  4. Pt to perform B LE exs in sitting or supine x 20 reps to strengthen B LE to improve functional mobility.-not met    Outcome: Ongoing (interventions implemented as appropriate)  Pt's goals set and pt will benefit from skilled PT services to work towards improved functional mobility including: transfers, up/down steps, and gait.   Jenna Gerardo, PT  7/20/2018

## 2018-07-20 NOTE — HPI
"This is a 53yo AA female with PMH of HTN, uncontrolled T2DM, PVD s/p stents who presents to the ED with two syncopal episodes today and multiple medical complaints. Patient states that nausea, severe epigastric abd pain (nonradiating, constant) began last night.  Pt began non-bilious white/pink foamy emesis x10 this morning.  states that pt was lying in bed and became unresponsive, then opened her eyes and began shaking for approximately 30 seconds with return to baseline mentation. 15 minutes later she got up to go to the bathroom, and  heard a thud and walked into the bathroom to found her passed out in the bathtub. Pt does not recall the fall and remembers waking up on the floor. Pt reports remote numbness in her left chest and left arm extending not past the wrist.  Pt did not take anythign at home for her nausea, despite having zofran and phenergan.  Patient has not checked her BG all day and has not taken any insulin.  Patient denies f/c, dizziness, weakness, neck pain, hematochezia.      Of note, patient has presented for similar episodes of syncope and N/V.  Recently discharged 6/2 for ROSLYN in setting of N/V.  Resolved during admission and has not had any issues since.  Patient had MRI, CTH, EEG prior to that admission for "seizure events".   reports these are seizures but seem to be only myoclonic activity with syncope rather than seizures.  Patient has known anxiety and had panic attack during that admission.  Also reports diarrhea x2 days.    In ED, CTH shows no acute intracranial abnormality. Initially hypertensive in ED (SBP max 204) in setting of N/V- resolved after IV labetalol (BP 150s-160s on admission).  UA shows 3+ proteinuria, 3+ glucose, 1+ ketones, 1+ occult blood.  No evidence of infection. Also, K 3.4, glucose 323.  Mildly acidotic with CO2 20, but AG 16.  Troponins negative x2.   "

## 2018-07-20 NOTE — ASSESSMENT & PLAN NOTE
"Recurrent episodes of syncope and myoclonic acticity that  believes is 2/2 "seizures".  Previous workup with MRI, CTH, and EEG without evidence of epilepsy  - CTH in ED today without acute abnormality  - Occurring in setting of dehydration/vomiting but could also be psychogenic  - delirium precautions, Neuro checks Q4 hours  "

## 2018-07-20 NOTE — CONSULTS
Food & Nutrition  Education    Diet Education: Diabetic Diet   Time Spent: 10 minutes  Learners: Pt      Nutrition Education provided with handouts: Meal Planning Using the Plate Method      Comments: A1c 10.2. Pt reported previous diabetic diet education. Dicussed CHO serving sizes, lean protein, and healthy fats. Encouraged non-starchy vegetables, staying hydrated, and avoiding skipping meals. Pt verbalized understanding.        All questions and concerns answered. Dietitian's contact information provided.       Follow-Up: Yes - 7/30/18    Please re-consult as needed.

## 2018-07-20 NOTE — ED NOTES
Pt ambulatory to bathroom assisted by nurse; steady gait noted.  Pt returned to room 5 without incident.  Pt replaced on continuous cardiac and pulse ox monitoring with blood pressure to cycle every 15 minutes.  Pt remains hypertensive;  Handly aware.  Bed locked in lowest position; side rails up and locked x 2; call light, bedside table, and personal belongings within reach.  Pt denies needs or complaints at this time; will continue to monitor pt.

## 2018-07-20 NOTE — ASSESSMENT & PLAN NOTE
51yo AA female with PMH of HTN, uncontrolled T2DM, PVD s/p stents who presents to the ED with two syncopal episodes today in setting of N/V.  N/V has occurred for several years but acutely worse x4 months ago.    - Suspect gastroparesis vs anxiety-induced vs gastroenteritis.    - Previous similar presentations that resolved with antiemetics.  Associated with UTI during last admission, but no evidence of UTI here.   - NM gastric emptying study ordered in setting of recent Hg A1c >14 and likely noncompliant with home meds  - Stool studies and c.diff ordered.  Low suspicion for C.diff but symptomatic  - will trial reglan IV (cannot tolerate PO)  - lipase 12, AST/ALT WNL  - CTA abdomen on 6/1 negative for ischemia  - abd exam unremarkable  - CLD  - PRN antiemetics, mIVF

## 2018-07-20 NOTE — PLAN OF CARE
In brief 52F h/o uncontrolled T2DM, PVD s/p stents, presented after 2x syncopal episodes with some jerking noted (has h/o or prior extensive seizure w/u (CTA,MRI, EEG) all negative), in setting of severe nausea / vomiting. Admitted overnight. Received reglan with significant improvement in nausea, gastric emptying study unsuccessful 2/2 vomiting, currently on clear liquid diet. Glucose 200-300 today. Currently denies nausea, recent vomiting. No abd pain but reports feeling of gas.  at bedside. Physical exam relatively unremarkable, no abd tenderness noted.    Plan:  -Split Levimir and increase to 26u BID to facilitate absorption  -Cont aspart 9u AC  -Add simethacone PRN  -Will need outpatient gastric emptying study to eval for gastroparesis  -Schedule reglan PO 5u TID AC  -Advance diet as tolerated (DM / cardiac diet)    Vladimir Saul MD  Department of Hospital Medicine  Pager: [phone]  Spectra: [phone]

## 2018-07-20 NOTE — H&P
[facility] Hospital Medicine  History & Physical    Patient Name: Suyapa Connelly  MRN: [id number]  Admission Date: 7/19/2018  Attending Physician: Manasa Kennedy MD   Primary Care Provider: Erlinda Rahman NP    Hospital Medicine Team: Networked reference to record PCT  Jacinto Means PA-C     Patient information was obtained from patient, past medical records and ER records.     Subjective:     Principal Problem:Non-intractable vomiting with nausea    Chief Complaint:   Chief Complaint   Patient presents with    Loss of Consciousness     syncopal episode x2 and seizure today.  reports multiple episodes of vomiting and diarrhea today. patient appears weak and lethargic in triage. unable to sit up in wheelchair without assisstance.         HPI: This is a 53yo AA female with PMH of HTN, uncontrolled T2DM, PVD s/p stents who presents to the ED with two syncopal episodes today and multiple medical complaints. Patient states that nausea, severe epigastric abd pain (nonradiating, constant) began last night.  Pt began non-bilious white/pink foamy emesis x10 this morning.  states that pt was lying in bed and became unresponsive, then opened her eyes and began shaking for approximately 30 seconds with return to baseline mentation. 15 minutes later she got up to go to the bathroom, and  heard a thud and walked into the bathroom to found her passed out in the bathtub. Pt does not recall the fall and remembers waking up on the floor. Pt reports remote numbness in her left chest and left arm extending not past the wrist.  Pt did not take anythign at home for her nausea, despite having zofran and phenergan.  Patient has not checked her BG all day and has not taken any insulin.  Patient denies f/c, dizziness, weakness, neck pain, hematochezia.      Of note, patient has presented for similar episodes of syncope and N/V.  Recently discharged 6/2 for ROSLYN in setting of N/V.  Resolved  "during admission and has not had any issues since.  Patient had MRI, CTH, EEG prior to that admission for "seizure events".   reports these are seizures but seem to be only myoclonic activity with syncope rather than seizures.  Patient has known anxiety and had panic attack during that admission.  Also reports diarrhea x2 days.    In ED, CTH shows no acute intracranial abnormality. Initially hypertensive in ED (SBP max 204) in setting of N/V- resolved after IV labetalol (BP 150s-160s on admission).  UA shows 3+ proteinuria, 3+ glucose, 1+ ketones, 1+ occult blood.  No evidence of infection. Also, K 3.4, glucose 323.  Mildly acidotic with CO2 20, but AG 16.  Troponins negative x2.     Past Medical History:   Diagnosis Date    Anxiety     Depression     Diabetes mellitus     type 2    GERD (gastroesophageal reflux disease)     Hyperlipemia     Hypertension     Myocardial infarction 2010    minor-caused by stress per pt.       Past Surgical History:   Procedure Laterality Date    Angiogram - Right Extremity Right 7/9/15    angiogram-left leg  10/6/15       Review of patient's allergies indicates:   Allergen Reactions    Pcn [penicillins]      rash       No current facility-administered medications on file prior to encounter.      Current Outpatient Prescriptions on File Prior to Encounter   Medication Sig    aspirin 325 MG tablet Take 1 tablet (325 mg total) by mouth once daily.    atorvastatin (LIPITOR) 40 MG tablet Take 1 tablet (40 mg total) by mouth once daily.    blood sugar diagnostic Strp test  blood glucose 2 (two) times daily with meals.    blood-glucose meter Misc use as directed    carvedilol (COREG) 6.25 MG tablet Take 1 tablet (6.25 mg total) by mouth 2 (two) times daily with meals.    clopidogrel (PLAVIX) 75 mg tablet Take 1 tablet (75 mg total) by mouth once daily.    escitalopram oxalate (LEXAPRO) 10 MG tablet Take 1 tablet (10 mg total) by mouth once daily.    famotidine " "(PEPCID) 20 MG tablet Take 1 tablet (20 mg total) by mouth 2 (two) times daily.    gabapentin (NEURONTIN) 300 MG capsule Take 1 capsule (300 mg total) by mouth 3 (three) times daily.    hydrOXYzine pamoate (VISTARIL) 50 MG Cap Take 1 capsule (50 mg total) by mouth every 6 (six) hours as needed (anxiety).    insulin aspart U-100 (NOVOLOG) 100 unit/mL injection Inject 9 Units into the skin 3 (three) times daily with meals.    insulin glargine (LANTUS SOLOSTAR) 100 unit/mL (3 mL) InPn pen Inject 50 Units into the skin every evening.    insulin syringe-needle U-100 0.3 mL 30 gauge x 5/16 Syrg 1 each by Misc.(Non-Drug; Combo Route) route 2 (two) times daily with meals.    lancets 30 gauge Misc use to test blood glucose 2 (two) times daily with meals.    lisinopril (PRINIVIL,ZESTRIL) 40 MG tablet Take 1 tablet (40 mg total) by mouth once daily.    ondansetron (ZOFRAN-ODT) 8 MG TbDL Take 1 tablet (8 mg total) by mouth every 8 (eight) hours as needed.    pen needle, diabetic 32 gauge x 5/32" Ndle 1 each by Misc.(Non-Drug; Combo Route) route 2 (two) times daily with meals.    promethazine (PHENERGAN) 12.5 MG Tab Take 1 tablet (12.5 mg total) by mouth every 6 (six) hours as needed (nausea).    [DISCONTINUED] lancing device Misc 1 Device by Misc.(Non-Drug; Combo Route) route 2 (two) times daily with meals.     Family History     None        Social History Main Topics    Smoking status: Never Smoker    Smokeless tobacco: Never Used    Alcohol use No    Drug use: Yes     Types: Marijuana      Comment: used yesterday    Sexual activity: Not on file     Review of Systems   Constitutional: Positive for appetite change, diaphoresis (resolved) and fatigue (acute on chronic). Negative for chills and fever.   HENT: Negative for congestion and postnasal drip.    Eyes: Negative for photophobia and visual disturbance.   Respiratory: Negative for cough and shortness of breath.    Cardiovascular: Positive for chest pain " (resolved). Negative for palpitations and leg swelling.   Gastrointestinal: Positive for abdominal pain (epigastric), diarrhea, nausea and vomiting. Negative for constipation.   Genitourinary: Negative for difficulty urinating, dysuria and flank pain.   Musculoskeletal: Positive for back pain (R lower back) and neck stiffness. Negative for gait problem, joint swelling and neck pain.   Skin: Negative for rash and wound.   Neurological: Positive for dizziness, syncope and weakness (generalized). Negative for headaches.   Psychiatric/Behavioral: Negative for confusion. The patient is nervous/anxious.      Objective:     Vital Signs (Most Recent):  Temp: 98.4 °F (36.9 °C) (07/20/18 0230)  Pulse: 99 (07/20/18 0230)  Resp: 16 (07/20/18 0230)  BP: (!) 181/97 (MD Called) (07/20/18 0230)  SpO2: 98 % (07/20/18 0230) Vital Signs (24h Range):  Temp:  [97.4 °F (36.3 °C)-98.4 °F (36.9 °C)] 98.4 °F (36.9 °C)  Pulse:  [] 99  Resp:  [16-27] 16  SpO2:  [95 %-99 %] 98 %  BP: (154-207)/() 181/97     Weight: 96 kg (211 lb 10.3 oz)  Body mass index is 34.16 kg/m².    Physical Exam   Constitutional: She is oriented to person, place, and time. She appears well-developed and well-nourished. No distress.   Lethargic female in NAD with  at bedside, giving most of history   HENT:   Head: Normocephalic and atraumatic.   Eyes: Conjunctivae and EOM are normal. Pupils are equal, round, and reactive to light.   Neck: Normal range of motion. Neck supple.   Cardiovascular: Normal rate, regular rhythm, normal heart sounds and intact distal pulses.    No murmur heard.  Pulmonary/Chest: Effort normal and breath sounds normal. No respiratory distress. She has no wheezes.   Abdominal: Soft. She exhibits no distension. There is no tenderness.   + diminished bowel sounds  Obese abdomen   Musculoskeletal: Normal range of motion.   Neurological: She is alert and oriented to person, place, and time. No cranial nerve deficit.   Steady  gait  No pronator drift  No gross focal deficit   Skin: Skin is warm and dry. She is not diaphoretic.   Psychiatric: She has a normal mood and affect. Her behavior is normal. Judgment and thought content normal.   Nursing note and vitals reviewed.        CRANIAL NERVES     CN III, IV, VI   Pupils are equal, round, and reactive to light.  Extraocular motions are normal.        Significant Labs:   CBC:   Recent Labs  Lab 07/19/18 1932   WBC 7.63   HGB 13.9   HCT 41.1        CMP:   Recent Labs  Lab 07/19/18 1932      K 3.4*      CO2 20*   *   BUN 13   CREATININE 0.9   CALCIUM 10.3   PROT 8.7*   ALBUMIN 3.0*   BILITOT 0.6   ALKPHOS 128   AST 12   ALT 8*   ANIONGAP 16   EGFRNONAA >60.0     Lipase:   Recent Labs  Lab 07/19/18 2242   LIPASE 12     Troponin:   Recent Labs  Lab 07/19/18 1932 07/19/18 2242   TROPONINI <0.006 <0.006     Urine Studies:   Recent Labs  Lab 07/20/18  0011   COLORU Yellow   APPEARANCEUA Hazy*   PHUR 6.0   SPECGRAV 1.020   PROTEINUA 3+*   GLUCUA 3+*   KETONESU 1+*   BILIRUBINUA Negative   OCCULTUA 1+*   NITRITE Negative   UROBILINOGEN Negative   LEUKOCYTESUR Negative   RBCUA 36*   WBCUA 2   BACTERIA None   SQUAMEPITHEL 15   HYALINECASTS 2*       Significant Imaging: I have reviewed all pertinent imaging results/findings within the past 24 hours.    Assessment/Plan:     * Non-intractable vomiting with nausea    51yo AA female with PMH of HTN, uncontrolled T2DM, PVD s/p stents who presents to the ED with two syncopal episodes today in setting of N/V.  N/V has occurred for several years but acutely worse x4 months ago.    - Suspect gastroparesis vs anxiety-induced vs gastroenteritis.    - Previous similar presentations that resolved with antiemetics.  Associated with UTI during last admission, but no evidence of UTI here.   - NM gastric emptying study ordered in setting of recent Hg A1c >14 and likely noncompliant with home meds  - Stool studies and c.diff ordered.  Low  "suspicion for C.diff but symptomatic  - will trial reglan IV (cannot tolerate PO)  - lipase 12, AST/ALT WNL  - CTA abdomen on 6/1 negative for ischemia  - abd exam unremarkable  - CLD  - PRN antiemetics, mIVF        Syncope    Recurrent episodes of syncope and myoclonic acticity that  believes is 2/2 "seizures".  Previous workup with MRI, CTH, and EEG without evidence of epilepsy  - CTH in ED today without acute abnormality  - Occurring in setting of dehydration/vomiting but could also be psychogenic  - delirium precautions, Neuro checks Q4 hours        Type 2 diabetes mellitus without complication, with long-term current use of insulin    HgA1c >14 5/5/18  - patient reports compliance with insulin except during episodes of N/V  - --> 330s overnight.  No insuling given in ED.  Will give half of basal insulin and low dose SSI  - Will give 1 L NS.    - Do not suspect DKA at this time. Beta-hydroxy 0.3 and normal AG, but CO2 20 and 1+ ketones (which could be starvation ketosis).    - c/w home insulin: prandial 9U TIDWM, basal 50 U QHS  - check HgA1c  - low dose SSI, ACHS        MDD (major depressive disorder), recurrent episode, moderate    Stable  - c/w lexapro 10 mg PO daily, hydroxyzine PRN  - will need to f/u outpt with psych.    -  is very anxious and concerned on exam, but symptoms are not new.  Will need to discuss symptoms/history with patient directly, as  interjects.         Hypertensive urgency    Essential hypertension  - BP max 204 in ED. Given labetalol 10 mg IV and BP responded.  Patient has been noncompliant with home meds in setting of N/V.   - c/w coreg 6.25 mg PO BID, lisinopril 40 mg PO daily        Peripheral vascular disease    continue ASA, plavix, statin          VTE Risk Mitigation         Ordered     enoxaparin injection 40 mg  Daily      07/20/18 0236     IP VTE HIGH RISK PATIENT  Once      07/20/18 0236     Place HAILEY hose  Until discontinued      07/20/18 0236     " Place sequential compression device  Until discontinued      07/20/18 0236             Jacinto Means PA-C  Department of Hospital Medicine   [facility]

## 2018-07-20 NOTE — ED PROVIDER NOTES
Encounter Date: 7/19/2018    SCRIBE #1 NOTE: I, Sarah Almendarez, am scribing for, and in the presence of,  Dr. Maciel. I have scribed the following portions of the note - the Resident attestation.       History     Chief Complaint   Patient presents with    Loss of Consciousness     syncopal episode x2 and seizure today.  reports multiple episodes of vomiting and diarrhea today. patient appears weak and lethargic in triage. unable to sit up in wheelchair without assisstance.      Pt is a  51yo AA female with PMH of HTN, uncontrolled DM2, PVD s/p stents who presents to the ED with two syncopal episodes with possible seizure today.  reports pt was nauseous for the past 2 days and began [id number] this afternoon.  states that she was lying in bed and became unresponsive, then opened her eyes and began shaking for approximately one minute. Afterwards, patient was confused and fatigued, but became more alert. 15 minutes later she got up to go to the bathroom, and  heard a thud and walked into the bathroom to find her down. Pt does not recall the fall and remembers waking up on the floor. Pt initially was not responsive when physician entered the room, but moments later became more alert and able to participate in her exam. Pt states she has some numbness in her left chest and left arm extending not past the wrist.           Review of patient's allergies indicates:   Allergen Reactions    Pcn [penicillins]      rash     Past Medical History:   Diagnosis Date    Anxiety     Depression     Diabetes mellitus     type 2    GERD (gastroesophageal reflux disease)     Hyperlipemia     Hypertension     Myocardial infarction 2010    minor-caused by stress per pt.     Past Surgical History:   Procedure Laterality Date    Angiogram - Right Extremity Right 7/9/15    angiogram-left leg  10/6/15     Family History   Problem Relation Age of Onset    Anesthesia problems Neg Hx      Social  History   Substance Use Topics    Smoking status: Never Smoker    Smokeless tobacco: Never Used    Alcohol use No     Review of Systems   Constitutional: Negative for fever.   HENT: Negative for sneezing.    Cardiovascular: Positive for chest pain.   Gastrointestinal: Positive for vomiting.   Genitourinary: Negative for dysuria.   Neurological: Positive for seizures, syncope and numbness (left arm and chest). Negative for facial asymmetry.       Physical Exam     Initial Vitals [07/19/18 1826]   BP Pulse Resp Temp SpO2   (!) 193/95 102 18 97.4 °F (36.3 °C) 97 %      MAP       --         Physical Exam    Constitutional: She appears well-developed and well-nourished. She is not diaphoretic. No distress.   HENT:   Head: Normocephalic and atraumatic.   Eyes: EOM are normal. Pupils are equal, round, and reactive to light.   Cardiovascular: Normal rate, regular rhythm and normal heart sounds.   Pulmonary/Chest: Breath sounds normal.   Neurological: She is alert and oriented to person, place, and time. No cranial nerve deficit.         ED Course   Procedures  Labs Reviewed   CBC W/ AUTO DIFFERENTIAL - Abnormal; Notable for the following:        Result Value    Mono # 0.2 (*)     Gran% 83.6 (*)     Lymph% 12.8 (*)     Mono% 2.6 (*)     All other components within normal limits    Narrative:     ONE LAVENDER SHARED   COMPREHENSIVE METABOLIC PANEL - Abnormal; Notable for the following:     Potassium 3.4 (*)     CO2 20 (*)     Glucose 323 (*)     Total Protein 8.7 (*)     Albumin 3.0 (*)     ALT 8 (*)     All other components within normal limits    Narrative:     ONE LAVENDER SHARED   TROPONIN I    Narrative:     ONE LAVENDER SHARED   B-TYPE NATRIURETIC PEPTIDE    Narrative:     ONE LAVENDER SHARED   TROPONIN I     EKG Readings: (Independently Interpreted)   Initial Reading: No STEMI.   Normal sinus rhythm, no axis deviations, no interval changes. Initial EKG showed possible Q wave in v2, repeat EKG checking lead placement  showed normal R wave progression. No evidence of Acute MI       Imaging Results    None          Medical Decision Making:   History:   Old Medical Records: I decided to obtain old medical records.  Initial Assessment:   Pt is a 53yo Female with uncontrolled DM, HTN, PVD who presents to the ED with LOCx2 with possible seizure today and Nausea/Vomitting. Pt has been seen in the past for similar episodes but has never been diagnosed with a seizure disorder. Will investigate cardiac causes of syncope. Concern for striking head, will obtain head CT. UA to examine for UTI  Clinical Tests:   Lab Tests: Ordered and Reviewed  Radiological Study: Ordered and Reviewed  Medical Tests: Ordered and Reviewed  ED Management:  Cardiac evaluation negative. CT head shows no acute bleed or mass. Pt to be admitted for observation.             Scribe Attestation:   Scribe #1: I performed the above scribed service and the documentation accurately describes the services I performed. I attest to the accuracy of the note.    Attending Attestation:   Physician Attestation Statement for Resident:  As the supervising MD   Physician Attestation Statement: I have personally seen and examined this patient.   I agree with the above history. -: 52 y.o.female with nausea and numbness to left arm and left chest. Patients family noted patient became poorly responsive on bed. Patient was able to answer questions and was awake enough to go to the bathroom. While patient was in bathroom, family a heard thump and she was found on the floor. Patient is of possible concern for head trauma. Per family, patient had episode of sz like motions which included shaking arms and eyes rolling to the back of head. Patient was then transported here to ED. Patient has hx of CAD, peripheral vascular disease with stent in left leg. Because patient has high risk vasccular concerns, will obtain full cardiac work up admit to neuro for syncopal work up.   As the supervising  MD I agree with the above PE.    As the supervising MD I agree with the above treatment, course, plan, and disposition.                       Clinical Impression:   The primary encounter diagnosis was Syncope. A diagnosis of Chest pain was also pertinent to this visit.                             Hayden Braun MD  Resident  07/19/18 0031       Hayden Braun MD  Resident  07/20/18 3842

## 2018-07-20 NOTE — ASSESSMENT & PLAN NOTE
HgA1c >14 5/5/18  - patient reports compliance with insulin except during episodes of N/V  - --> 330s overnight.  No insuling given in ED.  Will give half of basal insulin and low dose SSI  - Will give 1 L NS.    - Do not suspect DKA at this time. Beta-hydroxy 0.3 and normal AG, but CO2 20 and 1+ ketones (which could be starvation ketosis).    - c/w home insulin: prandial 9U TIDWM, basal 50 U QHS  - check HgA1c  - low dose SSI, ACHS

## 2018-07-20 NOTE — ED NOTES
Pt resting quietly on stretcher; remains on continuous cardiac and pulse ox monitoring with non-invasive blood pressure to cycle every 15 minutes; pt remains hypertensive.  Dr Maciel made aware; states he will order medication to treat pt's blood pressure. Bed locked in lowest position; side rails up and locked x 2; call light, bedside table, and personal belongings within reach.  Pt updated on plan for head CT; denies needs or complaints at this time.  Report given to DEANGELO Bermudez.

## 2018-07-20 NOTE — ED NOTES
Pt ambulatory to the bathroom assisted by ; slightly unsteady gait noted.  Pt returned to room 5 without incident; replaced on continuous cardiac and pulse ox monitoring with blood pressure to cycle every 15 minutes. Pt remains hypertensive; VS otherwise stable.  Bed locked in lowest position; side rails up and locked x 2; call light, bedside table, and personal belongings within reach.  Pt medicated for nausea as documented; denies additional needs or complaints at this time.  Family members remain at bedside; will continue to monitor pt.

## 2018-07-20 NOTE — ED NOTES
Pt placed on continuous cardiac and pulse ox monitoring with non-invasive blood pressure to cycle every 15 minutes.  Sinus tachycardia with a HR in the low 100's noted; pt is also tacypneic.  Bed locked in lowest position; side rails up and locked x 2; call light and personal belongings within reach.  Family members remain at bedside; will continue to monitor pt.

## 2018-07-21 VITALS
BODY MASS INDEX: 34.01 KG/M2 | TEMPERATURE: 97 F | WEIGHT: 211.63 LBS | HEART RATE: 95 BPM | RESPIRATION RATE: 16 BRPM | OXYGEN SATURATION: 98 % | SYSTOLIC BLOOD PRESSURE: 157 MMHG | HEIGHT: 66 IN | DIASTOLIC BLOOD PRESSURE: 94 MMHG

## 2018-07-21 LAB
ANION GAP SERPL CALC-SCNC: 11 MMOL/L
BASOPHILS # BLD AUTO: 0.03 K/UL
BASOPHILS NFR BLD: 0.4 %
BUN SERPL-MCNC: 15 MG/DL
CALCIUM SERPL-MCNC: 9.3 MG/DL
CHLORIDE SERPL-SCNC: 102 MMOL/L
CO2 SERPL-SCNC: 23 MMOL/L
CREAT SERPL-MCNC: 0.8 MG/DL
DIFFERENTIAL METHOD: ABNORMAL
EOSINOPHIL # BLD AUTO: 0 K/UL
EOSINOPHIL NFR BLD: 0.3 %
ERYTHROCYTE [DISTWIDTH] IN BLOOD BY AUTOMATED COUNT: 12.4 %
EST. GFR  (AFRICAN AMERICAN): >60 ML/MIN/1.73 M^2
EST. GFR  (NON AFRICAN AMERICAN): >60 ML/MIN/1.73 M^2
GLUCOSE SERPL-MCNC: 141 MG/DL
HCT VFR BLD AUTO: 41.3 %
HGB BLD-MCNC: 13.5 G/DL
IMM GRANULOCYTES # BLD AUTO: 0.01 K/UL
IMM GRANULOCYTES NFR BLD AUTO: 0.1 %
LYMPHOCYTES # BLD AUTO: 1.6 K/UL
LYMPHOCYTES NFR BLD: 21.7 %
MAGNESIUM SERPL-MCNC: 2.5 MG/DL
MCH RBC QN AUTO: 30.3 PG
MCHC RBC AUTO-ENTMCNC: 32.7 G/DL
MCV RBC AUTO: 93 FL
MONOCYTES # BLD AUTO: 0.4 K/UL
MONOCYTES NFR BLD: 6.1 %
NEUTROPHILS # BLD AUTO: 5.1 K/UL
NEUTROPHILS NFR BLD: 71.4 %
NRBC BLD-RTO: 0 /100 WBC
PLATELET # BLD AUTO: 371 K/UL
PMV BLD AUTO: 10.5 FL
POCT GLUCOSE: 154 MG/DL (ref 70–110)
POCT GLUCOSE: 194 MG/DL (ref 70–110)
POTASSIUM SERPL-SCNC: 3.5 MMOL/L
RBC # BLD AUTO: 4.45 M/UL
SODIUM SERPL-SCNC: 136 MMOL/L
WBC # BLD AUTO: 7.2 K/UL

## 2018-07-21 PROCEDURE — 85025 COMPLETE CBC W/AUTO DIFF WBC: CPT

## 2018-07-21 PROCEDURE — G8989 SELF CARE D/C STATUS: HCPCS | Mod: CI

## 2018-07-21 PROCEDURE — 99225 PR SUBSEQUENT OBSERVATION CARE,LEVEL II: CPT | Mod: ,,, | Performed by: PHYSICIAN ASSISTANT

## 2018-07-21 PROCEDURE — G8987 SELF CARE CURRENT STATUS: HCPCS | Mod: CI

## 2018-07-21 PROCEDURE — G8988 SELF CARE GOAL STATUS: HCPCS | Mod: CI

## 2018-07-21 PROCEDURE — G0378 HOSPITAL OBSERVATION PER HR: HCPCS

## 2018-07-21 PROCEDURE — 63600175 PHARM REV CODE 636 W HCPCS: Performed by: INTERNAL MEDICINE

## 2018-07-21 PROCEDURE — 36415 COLL VENOUS BLD VENIPUNCTURE: CPT

## 2018-07-21 PROCEDURE — 25000003 PHARM REV CODE 250: Performed by: PHYSICIAN ASSISTANT

## 2018-07-21 PROCEDURE — 80048 BASIC METABOLIC PNL TOTAL CA: CPT

## 2018-07-21 PROCEDURE — 83735 ASSAY OF MAGNESIUM: CPT

## 2018-07-21 PROCEDURE — 97165 OT EVAL LOW COMPLEX 30 MIN: CPT

## 2018-07-21 PROCEDURE — 25000003 PHARM REV CODE 250: Performed by: INTERNAL MEDICINE

## 2018-07-21 RX ORDER — MAGNESIUM SULFATE HEPTAHYDRATE 40 MG/ML
2 INJECTION, SOLUTION INTRAVENOUS ONCE
Status: COMPLETED | OUTPATIENT
Start: 2018-07-21 | End: 2018-07-21

## 2018-07-21 RX ORDER — METOCLOPRAMIDE 5 MG/1
5 TABLET ORAL
Qty: 90 TABLET | Refills: 0 | Status: SHIPPED | OUTPATIENT
Start: 2018-07-21 | End: 2018-08-20

## 2018-07-21 RX ADMIN — FAMOTIDINE 20 MG: 20 TABLET ORAL at 09:07

## 2018-07-21 RX ADMIN — ATORVASTATIN CALCIUM 40 MG: 20 TABLET, FILM COATED ORAL at 09:07

## 2018-07-21 RX ADMIN — METOCLOPRAMIDE HYDROCHLORIDE 5 MG: 5 TABLET ORAL at 06:07

## 2018-07-21 RX ADMIN — GABAPENTIN 300 MG: 300 CAPSULE ORAL at 09:07

## 2018-07-21 RX ADMIN — CLOPIDOGREL 75 MG: 75 TABLET, FILM COATED ORAL at 09:07

## 2018-07-21 RX ADMIN — ESCITALOPRAM OXALATE 10 MG: 10 TABLET ORAL at 09:07

## 2018-07-21 RX ADMIN — METOCLOPRAMIDE HYDROCHLORIDE 5 MG: 5 TABLET ORAL at 10:07

## 2018-07-21 RX ADMIN — CARVEDILOL 6.25 MG: 6.25 TABLET, FILM COATED ORAL at 09:07

## 2018-07-21 RX ADMIN — LISINOPRIL 40 MG: 20 TABLET ORAL at 09:07

## 2018-07-21 RX ADMIN — ASPIRIN 325 MG ORAL TABLET 325 MG: 325 PILL ORAL at 09:07

## 2018-07-21 RX ADMIN — MAGNESIUM SULFATE IN WATER 2 G: 40 INJECTION, SOLUTION INTRAVENOUS at 02:07

## 2018-07-21 RX ADMIN — INSULIN DETEMIR 26 UNITS: 100 INJECTION, SOLUTION SUBCUTANEOUS at 09:07

## 2018-07-21 NOTE — PT/OT/SLP EVAL
"Occupational Therapy   Evaluation and Discharge Note    Name: Suyapa Connelly  MRN: [id number]  Admitting Diagnosis:  Non-intractable vomiting with nausea      Recommendations:     Discharge Recommendations: home (no OT needs)  Discharge Equipment Recommendations:  none  Barriers to discharge:  None    History:     Occupational Profile:  Living Environment: Pt lives alone in [place] with 4STE. Home has tub/shower. Following d/c pt plans to stay with mother-in-law in [place] with 12STE and BHR.  Previous level of function: PTA, pt was independent with all ADL and IADL. Pt was not using any AD for ambulation or ADLs. Pt does drive and works as a sitter.   Roles and Routines: mother, sitter   Equipment Owned:  none  Assistance upon Discharge: Pt will have assist from family following d/c     Past Medical History:   Diagnosis Date    Anxiety     Depression     Diabetes mellitus     type 2    GERD (gastroesophageal reflux disease)     Hyperlipemia     Hypertension     Myocardial infarction 2010    minor-caused by stress per pt.       Past Surgical History:   Procedure Laterality Date    Angiogram - Right Extremity Right 7/9/15    angiogram-left leg  10/6/15       Subjective     Chief Complaint: "soreness"  Patient/Family stated goals: go home  Communicated with: RN prior to session.  Pain/Comfort:  · Pain Rating 1: 0/10  · Pain Addressed 1: Reposition, Distraction  · Pain Rating Post-Intervention 1: 0/10    Patients cultural, spiritual, Rastafari conflicts given the current situation: none reported    Objective:     Patient found with:  (no lines connected)    General Precautions: Standard, fall   Orthopedic Precautions:N/A   Braces: N/A     Occupational Performance:    Bed Mobility:    · Patient completed Sit to Supine with modified independence and with side rail    Functional Mobility/Transfers:  · Patient completed Sit <> Stand Transfer with modified independence  with  no assistive device   · Patient completed Toilet " Transfer Step Transfer technique with modified independence with  no AD  · Functional Mobility: Pt ambulate 20 ft in room from bed<>bathroom with supervision using no AD    Activities of Daily Living:  · Grooming: independence standing at sink to wash hands  · Lower Body Dressing: independence to manage B socks while seated EOB  · Toileting: independence for clothing management and hygiene    Cognitive/Visual Perceptual:  Cognitive/Psychosocial Skills:     -       Oriented to: Person, Place and Situation   -       Follows Commands/attention:Follows multistep  commands  -       Communication: clear/fluent  -       Memory: No Deficits noted  -       Safety awareness/insight to disability: intact   -       Mood/Affect/Coping skills/emotional control: Appropriate to situation  Visual/Perceptual:      -Intact    Physical Exam:  Balance:    -       good sitting/standing balance  Postural examination/scapula alignment:    -       Rounded shoulders  -       Forward head  Skin integrity: Visible skin intact  Edema:  None noted  Sensation:    -       Impaired  Pt reports B foot numbness, occasional tingling in LUE and B fingertips causing difficulty with fine motor tasks  Dominant hand:    -       R hand  Upper Extremity Range of Motion:     -       Right Upper Extremity: WFL  -       Left Upper Extremity: WFL  Upper Extremity Strength:    -       Right Upper Extremity: WFL  -       Left Upper Extremity: WFL   Strength:    -       Right Upper Extremity: WFL  -       Left Upper Extremity: WFL  Fine Motor Coordination:    -       Impaired  Pt reporting difficulty with fine motor tasks when B fingertips are numb/tingling  Gross motor coordination:   WFL    Patient left HOB elevated with all lines intact and call button in reach    AMPA 6 Click:  AMPAC Total Score: 23    Treatment & Education:  Pt educated on role of OT/POC  Pt educated on and in agreement with d/c from OT with ability to re-consult as needed  White  "board/communication board updated  Education:    Assessment:     Suyapa Connelly is a 52 y.o. female with a medical diagnosis of Non-intractable vomiting with nausea. At this time, patient is functioning at their prior level of function and does not require further acute OT services.     Clinical Decision Makin.  OT Low:  "Pt evaluation falls under low complexity for evaluation coding due to performance deficits noted in 1-3 areas as stated above and 0 co-morbities affecting current functional status. Data obtained from problem focused assessments. No modifications or assistance was required for completion of evaluation. Only brief occupational profile and history review completed."     Plan:     During this hospitalization, patient does not require further acute OT services.  Please re-consult if situation changes.    · Plan of Care Reviewed with: patient    This Plan of care has been discussed with the patient who was involved in its development and understands and is in agreement with the identified goals and treatment plan    GOALS:    Occupational Therapy Goals     Not on file          Multidisciplinary Problems (Resolved)        Problem: Occupational Therapy Goal    Goal Priority Disciplines Outcome Interventions   Occupational Therapy Goal   (Resolved)     OT, PT/OT Outcome(s) achieved                    Time Tracking:     OT Date of Treatment: 18  OT Start Time: 753  OT Stop Time: 802  OT Total Time (min): 9 min    Billable Minutes:Evaluation 9    Christina Amado OT  2018    "

## 2018-07-21 NOTE — PROGRESS NOTES
Pt discharged. Instructions given and explained. Pt verbalized understanding with no questions. Pt AAOx3, VSS

## 2018-07-21 NOTE — PLAN OF CARE
Problem: Patient Care Overview  Goal: Plan of Care Review  Outcome: Ongoing (interventions implemented as appropriate)  PT is AAOX4, no acute changes noted during shift, pt is up ad katt and repositions self well in bed q2, no skin breakdown noted, neuro checks done q4, no new deficits noted, delirium precautions maintained, VSS.  No falls noted. Fall precautions remain Pain assessed. No pain noted. Pt resting comfortably in bed. Call light in reach. Will continue to monitor.

## 2018-07-21 NOTE — PLAN OF CARE
Problem: Occupational Therapy Goal  Goal: Occupational Therapy Goal  Outcome: Outcome(s) achieved Date Met: 07/21/18  Eval completed; no OT needs at this time

## 2018-07-22 NOTE — HOSPITAL COURSE
"Suyapa Connelly was placed on observation for recurrent "seizure-like" activity. CT head was without acute abnormality. Previous work-up for presenting symptoms have een unremarkable. Abdominal pain was believed to be secondary to gastroparesis however patient was unable to tolerate gastric emptying study. Prior to discharge patient expressed that she has not followed up with psychiatry/mental health as she has been advised that her symptoms appear to be triggered by stress. Symptoms discussed in great length with both  and patient. They verbalized understanding and patient agreeable to psychiatric follow-up. Upon discharge,  requested information regarding disability paperwork and process, advised to discuss with patients PCP. Patient stable, tolerating PO and asymptomatic prior to discharge   "

## 2018-07-23 NOTE — PROGRESS NOTES
Physical Therapy Discharge Summary    Name: Suyapa Connelly  MRN: [id number]   Principal Problem: Non-intractable vomiting with nausea     Patient Discharged from acute Physical Therapy on 7/21/18.  Please refer to prior PT noted date on 7/20/18 for functional status.     Assessment:     Patient appropriate for care in another setting.    Objective:     GOALS:    Physical Therapy Goals        Problem: Physical Therapy Goal    Goal Priority Disciplines Outcome Goal Variances Interventions   Physical Therapy Goal     PT/OT, PT Ongoing (interventions implemented as appropriate)     Description:  PT goals until 7/25/18    1. Pt sit to stand with no AD with supervision-not met  2. Pt to perform gait 250ft with no AD withsupervision.-not met  3. Pt to up/down 10 steps with R rail with SBA.-not met  4. Pt to perform B LE exs in sitting or supine x 20 reps to strengthen B LE to improve functional mobility.-not met                    No goals met due to pt seen for eval only prior to D/C  Reasons for Discontinuation of Therapy Services  Transfer to alternate level of care.      Plan:     Patient Discharged to: Home no PT services needed.    Jenna Gerardo, PT  7/23/2018

## 2018-07-25 NOTE — DISCHARGE SUMMARY
"[facility] Hospital Medicine  Discharge Summary      Patient Name: Suyapa Connelly  MRN: [id number]  Admission Date: 7/19/2018  Hospital Length of Stay: 0 days  Discharge Date and Time: 7/21/2018  2:38 PM  Attending Physician: No att. providers found   Discharging Provider: Dee Stewart PA-C  Primary Care Provider: Erlinda Rahman NP  Hospital Medicine Team: [facility] E Dee Stewart PA-C    HPI:   This is a 53yo AA female with PMH of HTN, uncontrolled T2DM, PVD s/p stents who presents to the ED with two syncopal episodes today and multiple medical complaints. Patient states that nausea, severe epigastric abd pain (nonradiating, constant) began last night.  Pt began non-bilious white/pink foamy emesis x10 this morning.  states that pt was lying in bed and became unresponsive, then opened her eyes and began shaking for approximately 30 seconds with return to baseline mentation. 15 minutes later she got up to go to the bathroom, and  heard a thud and walked into the bathroom to found her passed out in the bathtub. Pt does not recall the fall and remembers waking up on the floor. Pt reports remote numbness in her left chest and left arm extending not past the wrist.  Pt did not take anythign at home for her nausea, despite having zofran and phenergan.  Patient has not checked her BG all day and has not taken any insulin.  Patient denies f/c, dizziness, weakness, neck pain, hematochezia.      Of note, patient has presented for similar episodes of syncope and N/V.  Recently discharged 6/2 for ROSLYN in setting of N/V.  Resolved during admission and has not had any issues since.  Patient had MRI, CTH, EEG prior to that admission for "seizure events".   reports these are seizures but seem to be only myoclonic activity with syncope rather than seizures.  Patient has known anxiety and had panic attack during that admission.  Also reports diarrhea x2 days.    In ED, CTH shows " "no acute intracranial abnormality. Initially hypertensive in ED (SBP max 204) in setting of N/V- resolved after IV labetalol (BP 150s-160s on admission).  UA shows 3+ proteinuria, 3+ glucose, 1+ ketones, 1+ occult blood.  No evidence of infection. Also, K 3.4, glucose 323.  Mildly acidotic with CO2 20, but AG 16.  Troponins negative x2.     * No surgery found *      Hospital Course:   Suyapa Connelly was placed on observation for recurrent "seizure-like" activity. CT head was without acute abnormality. Previous work-up for presenting symptoms have een unremarkable. Abdominal pain was believed to be secondary to gastroparesis however patient was unable to tolerate gastric emptying study. Prior to discharge patient expressed that she has not followed up with psychiatry/mental health as she has been advised that her symptoms appear to be triggered by stress. Symptoms discussed in great length with both  and patient. They verbalized understanding and patient agreeable to psychiatric follow-up. Upon discharge,  requested information regarding disability paperwork and process, advised to discuss with patients PCP. Patient stable, tolerating PO and asymptomatic prior to discharge      Consults:   Consults         Status Ordering Provider     Inpatient consult to Registered Dietitian/Nutritionist  Once     Provider:  (Not yet assigned)    NAMITA Hsu          No new Assessment & Plan notes have been filed under this hospital service since the last note was generated.  Service: Hospital Medicine    Final Active Diagnoses:    Diagnosis Date Noted POA    PRINCIPAL PROBLEM:  Non-intractable vomiting with nausea [R11.2] 07/20/2018 Unknown    Hypertensive urgency [I16.0] 07/20/2018 Yes    Syncope [R55] 07/19/2018 Yes    MDD (major depressive disorder), recurrent episode, moderate [F33.1] 05/07/2018 Yes     Chronic    Type 2 diabetes mellitus without complication, with long-term current use of " insulin [E11.9, Z79.4] 05/05/2018 Not Applicable    Essential hypertension [I10] 05/05/2018 Yes    Peripheral vascular disease [I73.9] 10/06/2015 Yes      Problems Resolved During this Admission:    Diagnosis Date Noted Date Resolved POA       Discharged Condition: stable    Disposition: Home or Self Care    Follow Up:  Follow-up Information     Schedule an appointment as soon as possible for a visit with Erlinda Rahman NP.    Specialty:  Family Medicine  Contact information:  [address] [place] [address] [id number] [phone]                 Patient Instructions:     NM Gastric Emptying   Standing Status: Future  Standing Exp. Date: 07/21/19   Order Specific Question Answer Comments   May the Radiologist modify the order per protocol to meet the clinical needs of the patient? Yes      Ambulatory referral to Neurology   Referral Priority: Routine Referral Type: Consultation   Referral Reason: Specialty Services Required    Requested Specialty: Neurology    Number of Visits Requested: 1      Diet Cardiac     Notify your health care provider if you experience any of the following:  persistent dizziness, light-headedness, or visual disturbances     Cardiac event monitor   Standing Status: Future  Standing Exp. Date: 07/21/19   Order Specific Question Answer Comments   Cardiac Event Monitor Auto Trigger      Activity as tolerated         Significant Diagnostic Studies: Labs: All labs within the past 24 hours have been reviewed    Pending Diagnostic Studies:     None         Medications:  Reconciled Home Medications:      Medication List      START taking these medications    metoclopramide HCl 5 MG tablet  Commonly known as:  REGLAN  Take 1 tablet (5 mg total) by mouth 3 (three) times daily before meals.        CONTINUE taking these medications    aspirin 325 MG tablet  Take 1 tablet (325 mg total) by mouth once daily.     atorvastatin 40 MG tablet  Commonly known as:  LIPITOR  Take 1 tablet (40 mg total) by  "mouth once daily.     BASAGLAR KWIKPEN U-100 INSULIN 100 unit/mL (3 mL) Inpn pen  Generic drug:  insulin glargine  Inject 50 Units into the skin every evening.     carvedilol 6.25 MG tablet  Commonly known as:  COREG  Take 1 tablet (6.25 mg total) by mouth 2 (two) times daily with meals.     clopidogrel 75 mg tablet  Commonly known as:  PLAVIX  Take 1 tablet (75 mg total) by mouth once daily.     escitalopram oxalate 10 MG tablet  Commonly known as:  LEXAPRO  Take 1 tablet (10 mg total) by mouth once daily.     famotidine 20 MG tablet  Commonly known as:  PEPCID  Take 1 tablet (20 mg total) by mouth 2 (two) times daily.     gabapentin 300 MG capsule  Commonly known as:  NEURONTIN  Take 1 capsule (300 mg total) by mouth 3 (three) times daily.     hydrOXYzine pamoate 50 MG Cap  Commonly known as:  VISTARIL  Take 1 capsule (50 mg total) by mouth every 6 (six) hours as needed (anxiety).     insulin syringe-needle U-100 0.3 mL 30 gauge x 5/16 Syrg  1 each by Misc.(Non-Drug; Combo Route) route 2 (two) times daily with meals.     lisinopril 40 MG tablet  Commonly known as:  PRINIVIL,ZESTRIL  Take 1 tablet (40 mg total) by mouth once daily.     NovoLOG U-100 Insulin aspart 100 unit/mL injection  Generic drug:  insulin aspart U-100  Inject 9 Units into the skin 3 (three) times daily with meals.     ondansetron 8 MG Tbdl  Commonly known as:  ZOFRAN-ODT  Take 1 tablet (8 mg total) by mouth every 8 (eight) hours as needed.     ONETOUCH DELICA LANCETS 30 gauge Misc  Generic drug:  lancets  use to test blood glucose 2 (two) times daily with meals.     ONETOUCH VERIO Strp  Generic drug:  blood sugar diagnostic  test  blood glucose 2 (two) times daily with meals.     ONETOUCH VERIO SYSTEM Misc  Generic drug:  blood-glucose meter  use as directed     pen needle, diabetic 32 gauge x 5/32" Ndle  1 each by Misc.(Non-Drug; Combo Route) route 2 (two) times daily with meals.     promethazine 12.5 MG Tab  Commonly known as:  " PHENERGAN  Take 1 tablet (12.5 mg total) by mouth every 6 (six) hours as needed (nausea).            Indwelling Lines/Drains at time of discharge:   Lines/Drains/Airways          No matching active lines, drains, or airways          Time spent on the discharge of patient: 30 minutes  Patient was seen and examined on the date of discharge and determined to be suitable for discharge.         Dee Stewart PA-C  Department of Hospital Medicine  [facility]

## 2018-08-15 RX ORDER — INSULIN ASPART 100 [IU]/ML
9 INJECTION, SOLUTION INTRAVENOUS; SUBCUTANEOUS
Qty: 10 ML | Refills: 0 | Status: CANCELLED | OUTPATIENT
Start: 2018-08-15 | End: 2019-08-15

## 2018-08-15 RX ORDER — INSULIN GLARGINE 100 [IU]/ML
50 INJECTION, SOLUTION SUBCUTANEOUS NIGHTLY
Qty: 15 ML | Refills: 0 | Status: CANCELLED | OUTPATIENT
Start: 2018-08-15 | End: 2019-02-11

## 2018-08-21 RX ORDER — ERGOCALCIFEROL 1.25 MG/1
50000 CAPSULE ORAL
Qty: 4 CAPSULE | Refills: 0 | Status: CANCELLED | OUTPATIENT
Start: 2018-08-21 | End: 2018-09-20

## 2018-08-24 RX ORDER — INSULIN ASPART 100 [IU]/ML
9 INJECTION, SOLUTION INTRAVENOUS; SUBCUTANEOUS
Qty: 10 ML | Refills: 0 | Status: ON HOLD | OUTPATIENT
Start: 2018-08-24 | End: 2019-12-09 | Stop reason: HOSPADM

## 2018-08-24 RX ORDER — INSULIN GLARGINE 100 [IU]/ML
50 INJECTION, SOLUTION SUBCUTANEOUS NIGHTLY
Qty: 15 ML | Refills: 0 | Status: ON HOLD | OUTPATIENT
Start: 2018-08-24 | End: 2019-02-20

## 2018-09-04 RX ORDER — ERGOCALCIFEROL 1.25 MG/1
50000 CAPSULE ORAL
Qty: 4 CAPSULE | Refills: 0 | Status: CANCELLED | OUTPATIENT
Start: 2018-08-21 | End: 2018-09-20

## 2018-09-14 ENCOUNTER — TELEPHONE (OUTPATIENT)
Dept: ORTHOPEDICS | Facility: CLINIC | Age: 53
End: 2018-09-14

## 2018-09-14 DIAGNOSIS — M25.561 PAIN IN BOTH KNEES, UNSPECIFIED CHRONICITY: Primary | ICD-10-CM

## 2018-09-14 DIAGNOSIS — M25.562 PAIN IN BOTH KNEES, UNSPECIFIED CHRONICITY: Primary | ICD-10-CM

## 2018-10-03 ENCOUNTER — TELEPHONE (OUTPATIENT)
Dept: PHARMACY | Facility: CLINIC | Age: 53
End: 2018-10-03

## 2019-12-06 ENCOUNTER — HOSPITAL ENCOUNTER (OUTPATIENT)
Facility: HOSPITAL | Age: 54
Discharge: HOME | End: 2019-12-09
Attending: EMERGENCY MEDICINE | Admitting: INTERNAL MEDICINE
Payer: MEDICAID

## 2019-12-06 DIAGNOSIS — Z79.4 TYPE 2 DIABETES MELLITUS WITHOUT COMPLICATION, WITH LONG-TERM CURRENT USE OF INSULIN: ICD-10-CM

## 2019-12-06 DIAGNOSIS — R07.9 CHEST PAIN, RULE OUT ACUTE MYOCARDIAL INFARCTION: ICD-10-CM

## 2019-12-06 DIAGNOSIS — I50.9 ACUTE ON CHRONIC CONGESTIVE HEART FAILURE, UNSPECIFIED HEART FAILURE TYPE: Primary | ICD-10-CM

## 2019-12-06 DIAGNOSIS — F41.1 GAD (GENERALIZED ANXIETY DISORDER): Chronic | ICD-10-CM

## 2019-12-06 DIAGNOSIS — I73.9 PERIPHERAL ARTERY DISEASE: ICD-10-CM

## 2019-12-06 DIAGNOSIS — I49.9 ARRHYTHMIA: ICD-10-CM

## 2019-12-06 DIAGNOSIS — I50.43 ACUTE ON CHRONIC COMBINED SYSTOLIC AND DIASTOLIC HEART FAILURE: ICD-10-CM

## 2019-12-06 DIAGNOSIS — R06.02 SHORTNESS OF BREATH: ICD-10-CM

## 2019-12-06 DIAGNOSIS — I50.9 ACUTE DECOMPENSATED HEART FAILURE: ICD-10-CM

## 2019-12-06 DIAGNOSIS — E11.9 TYPE 2 DIABETES MELLITUS WITHOUT COMPLICATION, WITH LONG-TERM CURRENT USE OF INSULIN: ICD-10-CM

## 2019-12-06 LAB
ALBUMIN SERPL BCP-MCNC: 2.5 G/DL (ref 3.5–5.2)
ALP SERPL-CCNC: 120 U/L (ref 55–135)
ALT SERPL W/O P-5'-P-CCNC: 15 U/L (ref 10–44)
ANION GAP SERPL CALC-SCNC: 11 MMOL/L (ref 8–16)
AST SERPL-CCNC: 16 U/L (ref 10–40)
BACTERIA #/AREA URNS AUTO: ABNORMAL /HPF
BASOPHILS # BLD AUTO: 0.06 K/UL (ref 0–0.2)
BASOPHILS NFR BLD: 0.8 % (ref 0–1.9)
BILIRUB SERPL-MCNC: 0.6 MG/DL (ref 0.1–1)
BILIRUB UR QL STRIP: NEGATIVE
BNP SERPL-MCNC: 234 PG/ML (ref 0–99)
BUN SERPL-MCNC: 19 MG/DL (ref 6–20)
CALCIUM SERPL-MCNC: 9.1 MG/DL (ref 8.7–10.5)
CHLORIDE SERPL-SCNC: 106 MMOL/L (ref 95–110)
CLARITY UR REFRACT.AUTO: CLEAR
CO2 SERPL-SCNC: 23 MMOL/L (ref 23–29)
COLOR UR AUTO: ABNORMAL
CREAT SERPL-MCNC: 1 MG/DL (ref 0.5–1.4)
D DIMER PPP IA.FEU-MCNC: 0.72 MG/L FEU
DIFFERENTIAL METHOD: ABNORMAL
EOSINOPHIL # BLD AUTO: 0.1 K/UL (ref 0–0.5)
EOSINOPHIL NFR BLD: 0.8 % (ref 0–8)
ERYTHROCYTE [DISTWIDTH] IN BLOOD BY AUTOMATED COUNT: 13.6 % (ref 11.5–14.5)
EST. GFR  (AFRICAN AMERICAN): >60 ML/MIN/1.73 M^2
EST. GFR  (NON AFRICAN AMERICAN): >60 ML/MIN/1.73 M^2
GLUCOSE SERPL-MCNC: 246 MG/DL (ref 70–110)
GLUCOSE UR QL STRIP: ABNORMAL
HCT VFR BLD AUTO: 37.1 % (ref 37–48.5)
HGB BLD-MCNC: 11.7 G/DL (ref 12–16)
HGB UR QL STRIP: ABNORMAL
HYALINE CASTS UR QL AUTO: 0 /LPF
IMM GRANULOCYTES # BLD AUTO: 0.02 K/UL (ref 0–0.04)
IMM GRANULOCYTES NFR BLD AUTO: 0.3 % (ref 0–0.5)
INFLUENZA A, MOLECULAR: NEGATIVE
INFLUENZA B, MOLECULAR: NEGATIVE
INR PPP: 0.9 (ref 0.8–1.2)
KETONES UR QL STRIP: NEGATIVE
LEUKOCYTE ESTERASE UR QL STRIP: NEGATIVE
LYMPHOCYTES # BLD AUTO: 1.2 K/UL (ref 1–4.8)
LYMPHOCYTES NFR BLD: 16.4 % (ref 18–48)
MCH RBC QN AUTO: 30.6 PG (ref 27–31)
MCHC RBC AUTO-ENTMCNC: 31.5 G/DL (ref 32–36)
MCV RBC AUTO: 97 FL (ref 82–98)
MICROSCOPIC COMMENT: ABNORMAL
MONOCYTES # BLD AUTO: 0.6 K/UL (ref 0.3–1)
MONOCYTES NFR BLD: 7.9 % (ref 4–15)
NEUTROPHILS # BLD AUTO: 5.4 K/UL (ref 1.8–7.7)
NEUTROPHILS NFR BLD: 73.8 % (ref 38–73)
NITRITE UR QL STRIP: NEGATIVE
NRBC BLD-RTO: 0 /100 WBC
PH UR STRIP: 6 [PH] (ref 5–8)
PLATELET # BLD AUTO: 335 K/UL (ref 150–350)
PMV BLD AUTO: 10.3 FL (ref 9.2–12.9)
POCT GLUCOSE: 186 MG/DL (ref 70–110)
POCT GLUCOSE: 196 MG/DL (ref 70–110)
POTASSIUM SERPL-SCNC: 3.7 MMOL/L (ref 3.5–5.1)
PROT SERPL-MCNC: 7.4 G/DL (ref 6–8.4)
PROT UR QL STRIP: ABNORMAL
PROTHROMBIN TIME: 9.9 SEC (ref 9–12.5)
RBC # BLD AUTO: 3.82 M/UL (ref 4–5.4)
RBC #/AREA URNS AUTO: 7 /HPF (ref 0–4)
SODIUM SERPL-SCNC: 140 MMOL/L (ref 136–145)
SP GR UR STRIP: 1.01 (ref 1–1.03)
SPECIMEN SOURCE: NORMAL
SQUAMOUS #/AREA URNS AUTO: 1 /HPF
TROPONIN I SERPL DL<=0.01 NG/ML-MCNC: 0.02 NG/ML (ref 0–0.03)
TROPONIN I SERPL DL<=0.01 NG/ML-MCNC: 0.02 NG/ML (ref 0–0.03)
URN SPEC COLLECT METH UR: ABNORMAL
WBC # BLD AUTO: 7.33 K/UL (ref 3.9–12.7)
WBC #/AREA URNS AUTO: 1 /HPF (ref 0–5)

## 2019-12-06 PROCEDURE — 87502 INFLUENZA DNA AMP PROBE: CPT

## 2019-12-06 PROCEDURE — 63600175 PHARM REV CODE 636 W HCPCS: Performed by: STUDENT IN AN ORGANIZED HEALTH CARE EDUCATION/TRAINING PROGRAM

## 2019-12-06 PROCEDURE — 93010 EKG 12-LEAD: ICD-10-PCS | Mod: ,,, | Performed by: INTERNAL MEDICINE

## 2019-12-06 PROCEDURE — 80053 COMPREHEN METABOLIC PANEL: CPT

## 2019-12-06 PROCEDURE — 83880 ASSAY OF NATRIURETIC PEPTIDE: CPT

## 2019-12-06 PROCEDURE — 93005 ELECTROCARDIOGRAM TRACING: CPT

## 2019-12-06 PROCEDURE — 81001 URINALYSIS AUTO W/SCOPE: CPT

## 2019-12-06 PROCEDURE — C9399 UNCLASSIFIED DRUGS OR BIOLOG: HCPCS | Performed by: INTERNAL MEDICINE

## 2019-12-06 PROCEDURE — 63600175 PHARM REV CODE 636 W HCPCS: Performed by: INTERNAL MEDICINE

## 2019-12-06 PROCEDURE — 85025 COMPLETE CBC W/AUTO DIFF WBC: CPT

## 2019-12-06 PROCEDURE — 93010 ELECTROCARDIOGRAM REPORT: CPT | Mod: ,,, | Performed by: INTERNAL MEDICINE

## 2019-12-06 PROCEDURE — 84484 ASSAY OF TROPONIN QUANT: CPT

## 2019-12-06 PROCEDURE — 25500020 PHARM REV CODE 255: Performed by: EMERGENCY MEDICINE

## 2019-12-06 PROCEDURE — G0378 HOSPITAL OBSERVATION PER HR: HCPCS

## 2019-12-06 PROCEDURE — 93010 ELECTROCARDIOGRAM REPORT: CPT | Mod: 76,,, | Performed by: INTERNAL MEDICINE

## 2019-12-06 PROCEDURE — 99222 1ST HOSP IP/OBS MODERATE 55: CPT | Mod: ,,, | Performed by: INTERNAL MEDICINE

## 2019-12-06 PROCEDURE — 96374 THER/PROPH/DIAG INJ IV PUSH: CPT | Mod: 59

## 2019-12-06 PROCEDURE — 99285 EMERGENCY DEPT VISIT HI MDM: CPT | Mod: ,,, | Performed by: EMERGENCY MEDICINE

## 2019-12-06 PROCEDURE — 99285 EMERGENCY DEPT VISIT HI MDM: CPT | Mod: 25

## 2019-12-06 PROCEDURE — 96376 TX/PRO/DX INJ SAME DRUG ADON: CPT

## 2019-12-06 PROCEDURE — 99222 PR INITIAL HOSPITAL CARE,LEVL II: ICD-10-PCS | Mod: ,,, | Performed by: INTERNAL MEDICINE

## 2019-12-06 PROCEDURE — 99285 PR EMERGENCY DEPT VISIT,LEVEL V: ICD-10-PCS | Mod: ,,, | Performed by: EMERGENCY MEDICINE

## 2019-12-06 PROCEDURE — 85610 PROTHROMBIN TIME: CPT

## 2019-12-06 PROCEDURE — 85379 FIBRIN DEGRADATION QUANT: CPT

## 2019-12-06 PROCEDURE — 25000003 PHARM REV CODE 250: Performed by: INTERNAL MEDICINE

## 2019-12-06 RX ORDER — ACETAMINOPHEN 325 MG/1
650 TABLET ORAL EVERY 4 HOURS PRN
Status: DISCONTINUED | OUTPATIENT
Start: 2019-12-06 | End: 2019-12-09 | Stop reason: HOSPADM

## 2019-12-06 RX ORDER — ESCITALOPRAM OXALATE 10 MG/1
10 TABLET ORAL DAILY
Status: DISCONTINUED | OUTPATIENT
Start: 2019-12-07 | End: 2019-12-09 | Stop reason: HOSPADM

## 2019-12-06 RX ORDER — GLUCAGON 1 MG
1 KIT INJECTION
Status: DISCONTINUED | OUTPATIENT
Start: 2019-12-06 | End: 2019-12-09 | Stop reason: HOSPADM

## 2019-12-06 RX ORDER — CARVEDILOL 6.25 MG/1
6.25 TABLET ORAL 2 TIMES DAILY WITH MEALS
Status: DISCONTINUED | OUTPATIENT
Start: 2019-12-06 | End: 2019-12-09 | Stop reason: HOSPADM

## 2019-12-06 RX ORDER — ATORVASTATIN CALCIUM 20 MG/1
40 TABLET, FILM COATED ORAL DAILY
Status: DISCONTINUED | OUTPATIENT
Start: 2019-12-07 | End: 2019-12-09 | Stop reason: HOSPADM

## 2019-12-06 RX ORDER — CLOPIDOGREL BISULFATE 75 MG/1
75 TABLET ORAL DAILY
Status: DISCONTINUED | OUTPATIENT
Start: 2019-12-07 | End: 2019-12-09 | Stop reason: HOSPADM

## 2019-12-06 RX ORDER — SODIUM CHLORIDE 0.9 % (FLUSH) 0.9 %
10 SYRINGE (ML) INJECTION
Status: DISCONTINUED | OUTPATIENT
Start: 2019-12-06 | End: 2019-12-09 | Stop reason: HOSPADM

## 2019-12-06 RX ORDER — INSULIN ASPART 100 [IU]/ML
2 INJECTION, SOLUTION INTRAVENOUS; SUBCUTANEOUS
Status: DISCONTINUED | OUTPATIENT
Start: 2019-12-06 | End: 2019-12-09 | Stop reason: HOSPADM

## 2019-12-06 RX ORDER — HYDROXYZINE HYDROCHLORIDE 25 MG/1
25 TABLET, FILM COATED ORAL 3 TIMES DAILY PRN
Status: DISCONTINUED | OUTPATIENT
Start: 2019-12-06 | End: 2019-12-09 | Stop reason: HOSPADM

## 2019-12-06 RX ORDER — PROCHLORPERAZINE EDISYLATE 5 MG/ML
5 INJECTION INTRAMUSCULAR; INTRAVENOUS EVERY 6 HOURS PRN
Status: DISCONTINUED | OUTPATIENT
Start: 2019-12-06 | End: 2019-12-09 | Stop reason: HOSPADM

## 2019-12-06 RX ORDER — ASPIRIN 325 MG
325 TABLET ORAL DAILY
Status: DISCONTINUED | OUTPATIENT
Start: 2019-12-07 | End: 2019-12-09 | Stop reason: HOSPADM

## 2019-12-06 RX ORDER — LISINOPRIL 20 MG/1
40 TABLET ORAL DAILY
Status: DISCONTINUED | OUTPATIENT
Start: 2019-12-07 | End: 2019-12-09 | Stop reason: HOSPADM

## 2019-12-06 RX ORDER — IBUPROFEN 200 MG
16 TABLET ORAL
Status: DISCONTINUED | OUTPATIENT
Start: 2019-12-06 | End: 2019-12-09 | Stop reason: HOSPADM

## 2019-12-06 RX ORDER — FUROSEMIDE 10 MG/ML
40 INJECTION INTRAMUSCULAR; INTRAVENOUS 2 TIMES DAILY
Status: DISCONTINUED | OUTPATIENT
Start: 2019-12-06 | End: 2019-12-08

## 2019-12-06 RX ORDER — INSULIN ASPART 100 [IU]/ML
0-5 INJECTION, SOLUTION INTRAVENOUS; SUBCUTANEOUS
Status: DISCONTINUED | OUTPATIENT
Start: 2019-12-06 | End: 2019-12-09 | Stop reason: HOSPADM

## 2019-12-06 RX ORDER — FUROSEMIDE 10 MG/ML
40 INJECTION INTRAMUSCULAR; INTRAVENOUS
Status: COMPLETED | OUTPATIENT
Start: 2019-12-06 | End: 2019-12-06

## 2019-12-06 RX ORDER — ACETAMINOPHEN 325 MG/1
650 TABLET ORAL EVERY 8 HOURS PRN
Status: DISCONTINUED | OUTPATIENT
Start: 2019-12-06 | End: 2019-12-09 | Stop reason: HOSPADM

## 2019-12-06 RX ORDER — ENOXAPARIN SODIUM 100 MG/ML
40 INJECTION SUBCUTANEOUS EVERY 24 HOURS
Status: DISCONTINUED | OUTPATIENT
Start: 2019-12-06 | End: 2019-12-09 | Stop reason: HOSPADM

## 2019-12-06 RX ORDER — IBUPROFEN 200 MG
24 TABLET ORAL
Status: DISCONTINUED | OUTPATIENT
Start: 2019-12-06 | End: 2019-12-09 | Stop reason: HOSPADM

## 2019-12-06 RX ORDER — TALC
6 POWDER (GRAM) TOPICAL NIGHTLY PRN
Status: DISCONTINUED | OUTPATIENT
Start: 2019-12-06 | End: 2019-12-09 | Stop reason: HOSPADM

## 2019-12-06 RX ORDER — POLYETHYLENE GLYCOL 3350 17 G/17G
17 POWDER, FOR SOLUTION ORAL DAILY
Status: DISCONTINUED | OUTPATIENT
Start: 2019-12-07 | End: 2019-12-07

## 2019-12-06 RX ORDER — ONDANSETRON 2 MG/ML
4 INJECTION INTRAMUSCULAR; INTRAVENOUS EVERY 8 HOURS PRN
Status: DISCONTINUED | OUTPATIENT
Start: 2019-12-06 | End: 2019-12-09 | Stop reason: HOSPADM

## 2019-12-06 RX ORDER — FAMOTIDINE 20 MG/1
20 TABLET, FILM COATED ORAL 2 TIMES DAILY
Status: DISCONTINUED | OUTPATIENT
Start: 2019-12-06 | End: 2019-12-09 | Stop reason: HOSPADM

## 2019-12-06 RX ORDER — CALCIUM CARBONATE 200(500)MG
500 TABLET,CHEWABLE ORAL DAILY PRN
Status: DISCONTINUED | OUTPATIENT
Start: 2019-12-06 | End: 2019-12-09 | Stop reason: HOSPADM

## 2019-12-06 RX ORDER — INSULIN ASPART 100 [IU]/ML
2 INJECTION, SOLUTION INTRAVENOUS; SUBCUTANEOUS
Status: DISCONTINUED | OUTPATIENT
Start: 2019-12-07 | End: 2019-12-06

## 2019-12-06 RX ADMIN — FAMOTIDINE 20 MG: 20 TABLET ORAL at 09:12

## 2019-12-06 RX ADMIN — INSULIN DETEMIR 7 UNITS: 100 INJECTION, SOLUTION SUBCUTANEOUS at 09:12

## 2019-12-06 RX ADMIN — CARVEDILOL 6.25 MG: 6.25 TABLET, FILM COATED ORAL at 07:12

## 2019-12-06 RX ADMIN — FUROSEMIDE 40 MG: 10 INJECTION, SOLUTION INTRAMUSCULAR; INTRAVENOUS at 09:12

## 2019-12-06 RX ADMIN — FUROSEMIDE 40 MG: 10 INJECTION, SOLUTION INTRAMUSCULAR; INTRAVENOUS at 07:12

## 2019-12-06 RX ADMIN — IOHEXOL 100 ML: 350 INJECTION, SOLUTION INTRAVENOUS at 12:12

## 2019-12-06 NOTE — PROVIDER PROGRESS NOTES - EMERGENCY DEPT.
Encounter Date: 12/6/2019    ED Physician Progress Notes         EKG - STEMI Decision  Initial Reading: No STEMI present.

## 2019-12-06 NOTE — ED NOTES
Patient arrives via triage for evaluation of sudden onset SOB at 0100 today - denies cough or cold symptoms - states usually has mild SOB with her CHF - patient states she has several stents in bilateral lower extremities and has had an MI in the past - denies fevers     Bilateral lower extremities with 3+ pitting edema distal to knees - left worse than right - lungs clear to auscultation in no apparent distress

## 2019-12-06 NOTE — ED PROVIDER NOTES
"Encounter Date: 12/6/2019       History     Chief Complaint   Patient presents with    Shortness of Breath     sob since 1am with leg swelling x 3 days. on fluid pills.      HPI     55 yo F hx of DM on insulin, MI, HTN, heart failure (unknown EF- not available in records or care everywhere), anxiety/depression p/w SOB since 1am last night. Typically sleeps on 3 pillows but was unable to lay flat last night. Says she has been very stressed with family lately and has noticed increased leg and abdominal swelling. Reporting some chest "pressure" but no pain. Denies productive cough/congestion. Feels different than prior MI. SOB has been constant since 1am, she says it worsened again around 6am. No nausea/vomiting/diarhea, denies fever/chills.     Of note, patient has been out of her plavix and "another blood thinner" for about 1 month. Does take 325 ASA daily.      Review of patient's allergies indicates:   Allergen Reactions    Pcn [penicillins]      rash     Past Medical History:   Diagnosis Date    Anxiety     Depression     Diabetes mellitus     type 2    GERD (gastroesophageal reflux disease)     Hyperlipemia     Hypertension     Myocardial infarction 2010    minor-caused by stress per pt.     Past Surgical History:   Procedure Laterality Date    Angiogram - Right Extremity Right 7/9/15    angiogram-left leg  10/6/15     Family History   Problem Relation Age of Onset    Anesthesia problems Neg Hx      Social History     Tobacco Use    Smoking status: Never Smoker    Smokeless tobacco: Never Used   Substance Use Topics    Alcohol use: No    Drug use: Yes     Types: Marijuana     Comment: used yesterday     Review of Systems   Constitutional: Negative for chills and fever.   HENT: Negative for congestion and rhinorrhea.    Respiratory: Positive for chest tightness and shortness of breath. Negative for cough and wheezing.    Cardiovascular: Positive for palpitations and leg swelling. Negative for chest " pain.   Gastrointestinal: Positive for abdominal distention. Negative for abdominal pain, constipation, diarrhea, nausea and vomiting.   Endocrine: Negative for polyuria.   Genitourinary: Negative for frequency and urgency.   Musculoskeletal: Negative for arthralgias and gait problem.   Skin: Negative for rash and wound.   Neurological: Negative for light-headedness and headaches.   Psychiatric/Behavioral: Negative for confusion.       Physical Exam     Initial Vitals [12/06/19 0853]   BP Pulse Resp Temp SpO2   (!) 141/78 103 18 98.4 °F (36.9 °C) 97 %      MAP       --         Physical Exam    Nursing note and vitals reviewed.  Constitutional: She appears well-developed and well-nourished. She is not diaphoretic. Distressed: mild conversational dyspnea.   HENT:   Head: Normocephalic and atraumatic.   Nose: Nose normal.   Mouth/Throat: No oropharyngeal exudate.   Eyes: Conjunctivae and EOM are normal.   Neck: Normal range of motion. Neck supple.   Cardiovascular: Regular rhythm and normal heart sounds.   HR in 90s  1+ PT pulses, difficult to palpate DP pulses though extremities warm and cap refill 2-3 seconds    Pulmonary/Chest: No respiratory distress. She has no wheezes.   Decreased breath sounds to b/l bases with fine inspiratory crackles    Abdominal: Soft. Bowel sounds are normal. She exhibits no distension. There is no tenderness. There is no rebound.   Musculoskeletal: Normal range of motion. She exhibits edema (1+ edema to bilateral feet, L calf more swollen than R, nontender, per pt chronic). She exhibits no tenderness.   Neurological: She is alert and oriented to person, place, and time.   Skin: Skin is warm and dry. Capillary refill takes 2 to 3 seconds. No erythema. No pallor.   Psychiatric: She has a normal mood and affect.         ED Course   Procedures  Labs Reviewed   CBC W/ AUTO DIFFERENTIAL - Abnormal; Notable for the following components:       Result Value    RBC 3.82 (*)     Hemoglobin 11.7 (*)      Mean Corpuscular Hemoglobin Conc 31.5 (*)     Gran% 73.8 (*)     Lymph% 16.4 (*)     All other components within normal limits   COMPREHENSIVE METABOLIC PANEL - Abnormal; Notable for the following components:    Glucose 246 (*)     Albumin 2.5 (*)     All other components within normal limits    Narrative:     add on [id number] dimer per Dr. Camara  12/06/2019  10:17 Ramu   B-TYPE NATRIURETIC PEPTIDE - Abnormal; Notable for the following components:     (*)     All other components within normal limits    Narrative:     add on [id number] dimer per Dr. Camara  12/06/2019  10:17 Ramu   URINALYSIS, REFLEX TO URINE CULTURE - Abnormal; Notable for the following components:    Protein, UA 2+ (*)     Glucose, UA 1+ (*)     Occult Blood UA 2+ (*)     All other components within normal limits    Narrative:     Preferred Collection Type->Urine, Clean Catch   D DIMER, QUANTITATIVE - Abnormal; Notable for the following components:    D-Dimer 0.72 (*)     All other components within normal limits    Narrative:     add on [id number] dimer per Dr. Camara  12/06/2019  10:17 Ramu   URINALYSIS MICROSCOPIC - Abnormal; Notable for the following components:    RBC, UA 7 (*)     All other components within normal limits    Narrative:     Preferred Collection Type->Urine, Clean Catch   INFLUENZA A & B BY MOLECULAR   TROPONIN I    Narrative:     add on [id number] dimer per Dr. Camara  12/06/2019  10:17 Ramu   PROTIME-INR   D DIMER, QUANTITATIVE   TROPONIN I     EKG Readings: (Independently Interpreted)   Rhythm: Sinus Tachycardia. Heart Rate: 107. Ectopy: No Ectopy. ST Segments: Normal ST Segments. Axis: Normal.       Imaging Results          CTA Chest Non-Coronary (PE Study) (Final result)  Result time 12/06/19 13:10:35    Final result by Tameka Yousif MD (12/06/19 13:10:35)                 Impression:      Abnormal chest CT:    1.  No pulmonary thromboembolism, pulmonary infarct or elevated right heart  pressures.    2.  A constellation of findings including ground-glass attenuation in upper lung zone predominance, heterogeneity in subsegmental distribution of lower lung zones, and dependent pleural fluid, raise the question of pulmonary edema especially as might occur with cardiac decompensation in this 54-year-old woman with abundant coronary calcification.    3.  Scattered subcentimeter nodules with upper zone predominance are most consistent with rare sites of acinar filling in early airspace edema.  Neoplastic or inflammatory pulmonary nodules could present in this fashion but are considered less likely.      Electronically signed by: Tameka Yousif MD  Date:    12/06/2019  Time:    13:10             Narrative:    EXAMINATION:  CTA CHEST NON CORONARY    CLINICAL HISTORY:  Chest pain, acute, PE suspected, intermed prob, positive D-dimer;    TECHNIQUE:  During intravenous bolus injection of 100 ml of Omnipaque 350 contrast medium and using low dose technique, the chest was surveyed from above the pulmonary apices through the posterior costophrenic angles.  Data was reconstructed for multiplanar images in axial, sagittal and coronal planes and for maximal intensity projection images in the axial plane.  Images were acquired without gating.    All CT scans at this facility use dose modulation, iterative reconstruction and/or weight based dosing when appropriate to reduce radiation dose to as low as reasonably achievable.    Xray dose: DLP = 433.90 mGy-cm.    COMPARISON:  Chest radiograph 12/06/2019.  05/05/2018.    FINDINGS:  Base of Neck: No significant abnormality.    Aorta: Left-sided aortic arch with 3 arterial branches.  The aorta maintains normal caliber, contour and course. There is calcific atherosclerosis that is most notable in 3 vessel coronary artery distribution.    Heart/pericardium: Normal size.  No pericardial effusion or calcification.    Pulmonary arteries: Pulmonary arteries distribute  normally.  Pulmonary arteries are brightly opacified.  There no pulmonary thromboembolism or other filling defect, no pulmonary infarct and no evidence of elevated right heart pressure.    Pulmonary veins, left atrium:    There are four pulmonary veins that return to the left atrium.    Tg/Mediastinum: No pathologic rohini enlargement.    Pleura: Small amount of dependent fluid in each pleural space..No pleural calcification.    Esophagus and upper abdomen: Esophagus maintains normal caliber and course.  No convincing evidence of disease identified in the upper abdomen.    Thoracic soft tissues: Normal. Both breasts are present.    4    Bones: No acute fracture. No suspicious lytic or sclerotic lesion.    Airways: Patent.    Lungs:    -I detect no pneumonia.    -Patchy heterogeneity is present throughout both lungs with basilar predominance suggesting elevated pulmonary vascular resistance, small airways disease or both.    -There is also ground-glass attenuation in the central portion of the upper lung zones suggesting interstitial pulmonary edema.    -Scattered subcentimeter sub solid nodules are present in both upper lobes (apical segment of the right upper lobe, axial series to 3 image 24; apical aspect of the apicoposterior segment of the left upper lobe axial series 3, image 24).  Although these could represent true intrapulmonary nodules, in light of the presence of findings consistent with pulmonary edema due to cardiac decompensation, I favor acinar filling indicating early airspace edema.                               X-Ray Chest AP Portable (Final result)  Result time 12/06/19 10:18:19    Final result by David Tilley III, MD (12/06/19 10:18:19)                 Impression:      Interstitial edema versus pneumonia new when compared to the prior study possibly CHF.      Electronically signed by: David Tilley MD  Date:    12/06/2019  Time:    10:18             Narrative:    EXAMINATION:  XR CHEST AP  PORTABLE    CLINICAL HISTORY:  CHF;    FINDINGS:  One view portable.    Heart size is normal there is mild edema and worsening.                                 Medical Decision Making:   Initial Assessment:   55 yo F hx of PVD with peripheral stents, hx of MI, DM on insulin, heart failure, p/w shortness of breath since about 1am. Ddx acute heart failure, pneumonia, ACS in diabetic, PE, pna.  Broad workup initiated including EKG, CBC, CMP, BNP/trop, d dimer given concern for L leg more swollen than R although patient does report chronic, is experiencing some pleuritic-like chest tightness. CXR, IV lasix given edema and increased abdominal girth per patient. Will continue to monitor closely.  Cassandra Salas DO HO-II  9:54 AM      Update:  CXR with pleural effusions.   ; Cr 1  D dimer slightly elevated.  CTPE negative for pulmonary embolus. Also with evidence of dependent pulmonary edema.   Patient reports feeling short of breath when up walking to the bathroom. Has had good UOP since IV lasix.   Also endorsing some chest discomfort following return from bathroom. Will repeat EKG and trop now. Initial trop negative.   Patient may need admission for continued IV diuresis in HF exacerbation.  Cassandra Salas DO HO-II  1:57 PM    Update: Plan to admit for observation as patient still with episode of HARTMAN. She is amenable to this plan. Have discussed with case mgmt and internal medicine, admitted to Dr. Vidal.  Cassandra Salas DO HO-II  2:30 PM                      Attending Attestation:   Physician Attestation Statement for Resident:  As the supervising MD   Physician Attestation Statement: I have personally seen and examined this patient.   I agree with the above history. -: 54-year-old woman presents for evaluation of worsening shortness of breath and chest pressure   As the supervising MD I agree with the above PE.    As the supervising MD I agree with the above treatment, course, plan, and disposition.  I  have reviewed and agree with the residents interpretation of the following: lab data, x-rays and EKG.  I have reviewed the following: old records at this facility.                                  Clinical Impression:       ICD-10-CM ICD-9-CM   1. Acute on chronic congestive heart failure, unspecified heart failure type I50.9 428.0   2. Shortness of breath R06.02 786.05         Disposition:   Disposition: Placed in Observation  Condition: Fair  IM                     Cassandra Salas DO  Resident  12/06/19 1431       Yony Saavedra MD  12/06/19 1771

## 2019-12-07 LAB
ANION GAP SERPL CALC-SCNC: 11 MMOL/L (ref 8–16)
ANION GAP SERPL CALC-SCNC: 12 MMOL/L (ref 8–16)
ASCENDING AORTA: 3.28 CM
AV INDEX (PROSTH): 0.48
AV MEAN GRADIENT: 6 MMHG
AV PEAK GRADIENT: 9 MMHG
AV VALVE AREA: 1.46 CM2
AV VELOCITY RATIO: 0.53
BASOPHILS # BLD AUTO: 0.06 K/UL (ref 0–0.2)
BASOPHILS NFR BLD: 0.7 % (ref 0–1.9)
BSA FOR ECHO PROCEDURE: 2.11 M2
BUN SERPL-MCNC: 15 MG/DL (ref 6–20)
BUN SERPL-MCNC: 17 MG/DL (ref 6–20)
CALCIUM SERPL-MCNC: 8.8 MG/DL (ref 8.7–10.5)
CALCIUM SERPL-MCNC: 9.3 MG/DL (ref 8.7–10.5)
CHLORIDE SERPL-SCNC: 101 MMOL/L (ref 95–110)
CHLORIDE SERPL-SCNC: 101 MMOL/L (ref 95–110)
CO2 SERPL-SCNC: 25 MMOL/L (ref 23–29)
CO2 SERPL-SCNC: 26 MMOL/L (ref 23–29)
CREAT SERPL-MCNC: 0.9 MG/DL (ref 0.5–1.4)
CREAT SERPL-MCNC: 1.1 MG/DL (ref 0.5–1.4)
CV ECHO LV RWT: 0.32 CM
DIFFERENTIAL METHOD: ABNORMAL
DOP CALC AO PEAK VEL: 1.48 M/S
DOP CALC AO VTI: 29.29 CM
DOP CALC LVOT AREA: 3 CM2
DOP CALC LVOT DIAMETER: 1.97 CM
DOP CALC LVOT PEAK VEL: 0.79 M/S
DOP CALC LVOT STROKE VOLUME: 42.8 CM3
DOP CALCLVOT PEAK VEL VTI: 14.05 CM
E WAVE DECELERATION TIME: 130.08 MSEC
E/A RATIO: 0.95
E/E' RATIO: 12.77 M/S
ECHO LV POSTERIOR WALL: 0.83 CM (ref 0.6–1.1)
EOSINOPHIL # BLD AUTO: 0.1 K/UL (ref 0–0.5)
EOSINOPHIL NFR BLD: 0.9 % (ref 0–8)
ERYTHROCYTE [DISTWIDTH] IN BLOOD BY AUTOMATED COUNT: 13.3 % (ref 11.5–14.5)
EST. GFR  (AFRICAN AMERICAN): >60 ML/MIN/1.73 M^2
EST. GFR  (AFRICAN AMERICAN): >60 ML/MIN/1.73 M^2
EST. GFR  (NON AFRICAN AMERICAN): 57.5 ML/MIN/1.73 M^2
EST. GFR  (NON AFRICAN AMERICAN): >60 ML/MIN/1.73 M^2
ESTIMATED AVG GLUCOSE: 249 MG/DL (ref 68–131)
FRACTIONAL SHORTENING: 14 % (ref 28–44)
GLUCOSE SERPL-MCNC: 228 MG/DL (ref 70–110)
GLUCOSE SERPL-MCNC: 240 MG/DL (ref 70–110)
HBA1C MFR BLD HPLC: 10.3 % (ref 4–5.6)
HCT VFR BLD AUTO: 37.9 % (ref 37–48.5)
HGB BLD-MCNC: 12.1 G/DL (ref 12–16)
IMM GRANULOCYTES # BLD AUTO: 0.02 K/UL (ref 0–0.04)
IMM GRANULOCYTES NFR BLD AUTO: 0.2 % (ref 0–0.5)
INTERVENTRICULAR SEPTUM: 0.96 CM (ref 0.6–1.1)
IVRT: 0.11 MSEC
LA MAJOR: 5.98 CM
LA MINOR: 6.01 CM
LA WIDTH: 4.77 CM
LACTATE SERPL-SCNC: 1.3 MMOL/L (ref 0.5–2.2)
LEFT ATRIUM SIZE: 4.33 CM
LEFT ATRIUM VOLUME INDEX: 51.9 ML/M2
LEFT ATRIUM VOLUME: 105.25 CM3
LEFT INTERNAL DIMENSION IN SYSTOLE: 4.45 CM (ref 2.1–4)
LEFT VENTRICLE DIASTOLIC VOLUME INDEX: 63.14 ML/M2
LEFT VENTRICLE DIASTOLIC VOLUME: 128.12 ML
LEFT VENTRICLE MASS INDEX: 82 G/M2
LEFT VENTRICLE SYSTOLIC VOLUME INDEX: 44.3 ML/M2
LEFT VENTRICLE SYSTOLIC VOLUME: 89.83 ML
LEFT VENTRICULAR INTERNAL DIMENSION IN DIASTOLE: 5.18 CM (ref 3.5–6)
LEFT VENTRICULAR MASS: 166.68 G
LV LATERAL E/E' RATIO: 16.6 M/S
LV SEPTAL E/E' RATIO: 10.38 M/S
LYMPHOCYTES # BLD AUTO: 2.2 K/UL (ref 1–4.8)
LYMPHOCYTES NFR BLD: 24.9 % (ref 18–48)
MAGNESIUM SERPL-MCNC: 1.6 MG/DL (ref 1.6–2.6)
MCH RBC QN AUTO: 30.1 PG (ref 27–31)
MCHC RBC AUTO-ENTMCNC: 31.9 G/DL (ref 32–36)
MCV RBC AUTO: 94 FL (ref 82–98)
MONOCYTES # BLD AUTO: 0.6 K/UL (ref 0.3–1)
MONOCYTES NFR BLD: 6.9 % (ref 4–15)
MV PEAK A VEL: 0.87 M/S
MV PEAK E VEL: 0.83 M/S
NEUTROPHILS # BLD AUTO: 5.8 K/UL (ref 1.8–7.7)
NEUTROPHILS NFR BLD: 66.4 % (ref 38–73)
NRBC BLD-RTO: 0 /100 WBC
PISA TR MAX VEL: 3.39 M/S
PLATELET # BLD AUTO: 368 K/UL (ref 150–350)
PMV BLD AUTO: 10 FL (ref 9.2–12.9)
POCT GLUCOSE: 146 MG/DL (ref 70–110)
POCT GLUCOSE: 176 MG/DL (ref 70–110)
POCT GLUCOSE: 204 MG/DL (ref 70–110)
POCT GLUCOSE: 246 MG/DL (ref 70–110)
POTASSIUM SERPL-SCNC: 3.1 MMOL/L (ref 3.5–5.1)
POTASSIUM SERPL-SCNC: 3.5 MMOL/L (ref 3.5–5.1)
PROCALCITONIN SERPL IA-MCNC: 0.05 NG/ML
RA MAJOR: 4.92 CM
RA PRESSURE: 3 MMHG
RA WIDTH: 4.47 CM
RBC # BLD AUTO: 4.02 M/UL (ref 4–5.4)
RETIRED EF AND QEF - SEE NOTES: 36 %
RIGHT VENTRICULAR END-DIASTOLIC DIMENSION: 3.94 CM
RV TISSUE DOPPLER FREE WALL SYSTOLIC VELOCITY 1 (APICAL 4 CHAMBER VIEW): 14.26 CM/S
SINUS: 2.6 CM
SODIUM SERPL-SCNC: 137 MMOL/L (ref 136–145)
SODIUM SERPL-SCNC: 139 MMOL/L (ref 136–145)
STJ: 2.26 CM
TDI LATERAL: 0.05 M/S
TDI SEPTAL: 0.08 M/S
TDI: 0.07 M/S
TR MAX PG: 46 MMHG
TRICUSPID ANNULAR PLANE SYSTOLIC EXCURSION: 2.42 CM
TROPONIN I SERPL DL<=0.01 NG/ML-MCNC: 0.03 NG/ML (ref 0–0.03)
TROPONIN I SERPL DL<=0.01 NG/ML-MCNC: 0.04 NG/ML (ref 0–0.03)
TSH SERPL DL<=0.005 MIU/L-ACNC: 1.71 UIU/ML (ref 0.4–4)
TV REST PULMONARY ARTERY PRESSURE: 49 MMHG
WBC # BLD AUTO: 8.8 K/UL (ref 3.9–12.7)

## 2019-12-07 PROCEDURE — 83735 ASSAY OF MAGNESIUM: CPT

## 2019-12-07 PROCEDURE — 83605 ASSAY OF LACTIC ACID: CPT

## 2019-12-07 PROCEDURE — 63600175 PHARM REV CODE 636 W HCPCS: Performed by: INTERNAL MEDICINE

## 2019-12-07 PROCEDURE — 94640 AIRWAY INHALATION TREATMENT: CPT

## 2019-12-07 PROCEDURE — 87040 BLOOD CULTURE FOR BACTERIA: CPT

## 2019-12-07 PROCEDURE — 83036 HEMOGLOBIN GLYCOSYLATED A1C: CPT

## 2019-12-07 PROCEDURE — 84484 ASSAY OF TROPONIN QUANT: CPT

## 2019-12-07 PROCEDURE — 97165 OT EVAL LOW COMPLEX 30 MIN: CPT

## 2019-12-07 PROCEDURE — 96375 TX/PRO/DX INJ NEW DRUG ADDON: CPT

## 2019-12-07 PROCEDURE — 93010 EKG 12-LEAD: ICD-10-PCS | Mod: ,,, | Performed by: INTERNAL MEDICINE

## 2019-12-07 PROCEDURE — 93005 ELECTROCARDIOGRAM TRACING: CPT

## 2019-12-07 PROCEDURE — 80048 BASIC METABOLIC PNL TOTAL CA: CPT

## 2019-12-07 PROCEDURE — 85025 COMPLETE CBC W/AUTO DIFF WBC: CPT

## 2019-12-07 PROCEDURE — G0378 HOSPITAL OBSERVATION PER HR: HCPCS

## 2019-12-07 PROCEDURE — 63600175 PHARM REV CODE 636 W HCPCS: Performed by: PHYSICIAN ASSISTANT

## 2019-12-07 PROCEDURE — 94761 N-INVAS EAR/PLS OXIMETRY MLT: CPT

## 2019-12-07 PROCEDURE — 84145 PROCALCITONIN (PCT): CPT

## 2019-12-07 PROCEDURE — 99232 PR SUBSEQUENT HOSPITAL CARE,LEVL II: ICD-10-PCS | Mod: ,,, | Performed by: INTERNAL MEDICINE

## 2019-12-07 PROCEDURE — 25000003 PHARM REV CODE 250: Performed by: INTERNAL MEDICINE

## 2019-12-07 PROCEDURE — 84443 ASSAY THYROID STIM HORMONE: CPT

## 2019-12-07 PROCEDURE — 99232 SBSQ HOSP IP/OBS MODERATE 35: CPT | Mod: ,,, | Performed by: INTERNAL MEDICINE

## 2019-12-07 PROCEDURE — 93010 ELECTROCARDIOGRAM REPORT: CPT | Mod: ,,, | Performed by: INTERNAL MEDICINE

## 2019-12-07 PROCEDURE — 97116 GAIT TRAINING THERAPY: CPT

## 2019-12-07 PROCEDURE — 25000242 PHARM REV CODE 250 ALT 637 W/ HCPCS: Performed by: PHYSICIAN ASSISTANT

## 2019-12-07 PROCEDURE — 96376 TX/PRO/DX INJ SAME DRUG ADON: CPT

## 2019-12-07 PROCEDURE — 99900035 HC TECH TIME PER 15 MIN (STAT)

## 2019-12-07 PROCEDURE — 84484 ASSAY OF TROPONIN QUANT: CPT | Mod: 91

## 2019-12-07 PROCEDURE — 36415 COLL VENOUS BLD VENIPUNCTURE: CPT

## 2019-12-07 PROCEDURE — 97161 PT EVAL LOW COMPLEX 20 MIN: CPT

## 2019-12-07 PROCEDURE — 96372 THER/PROPH/DIAG INJ SC/IM: CPT

## 2019-12-07 RX ORDER — IPRATROPIUM BROMIDE AND ALBUTEROL SULFATE 2.5; .5 MG/3ML; MG/3ML
3 SOLUTION RESPIRATORY (INHALATION) EVERY 4 HOURS PRN
Status: DISCONTINUED | OUTPATIENT
Start: 2019-12-07 | End: 2019-12-09 | Stop reason: HOSPADM

## 2019-12-07 RX ORDER — FUROSEMIDE 10 MG/ML
20 INJECTION INTRAMUSCULAR; INTRAVENOUS ONCE
Status: COMPLETED | OUTPATIENT
Start: 2019-12-07 | End: 2019-12-07

## 2019-12-07 RX ORDER — POTASSIUM CHLORIDE 750 MG/1
50 CAPSULE, EXTENDED RELEASE ORAL ONCE
Status: COMPLETED | OUTPATIENT
Start: 2019-12-07 | End: 2019-12-07

## 2019-12-07 RX ORDER — HYDRALAZINE HYDROCHLORIDE 25 MG/1
50 TABLET, FILM COATED ORAL EVERY 6 HOURS PRN
Status: DISCONTINUED | OUTPATIENT
Start: 2019-12-07 | End: 2019-12-09 | Stop reason: HOSPADM

## 2019-12-07 RX ORDER — FUROSEMIDE 10 MG/ML
20 INJECTION INTRAMUSCULAR; INTRAVENOUS ONCE
Status: DISCONTINUED | OUTPATIENT
Start: 2019-12-07 | End: 2019-12-07

## 2019-12-07 RX ADMIN — ENOXAPARIN SODIUM 40 MG: 100 INJECTION SUBCUTANEOUS at 04:12

## 2019-12-07 RX ADMIN — FUROSEMIDE 40 MG: 10 INJECTION, SOLUTION INTRAMUSCULAR; INTRAVENOUS at 05:12

## 2019-12-07 RX ADMIN — CLOPIDOGREL BISULFATE 75 MG: 75 TABLET ORAL at 08:12

## 2019-12-07 RX ADMIN — CARVEDILOL 6.25 MG: 6.25 TABLET, FILM COATED ORAL at 08:12

## 2019-12-07 RX ADMIN — FAMOTIDINE 20 MG: 20 TABLET ORAL at 08:12

## 2019-12-07 RX ADMIN — INSULIN ASPART 2 UNITS: 100 INJECTION, SOLUTION INTRAVENOUS; SUBCUTANEOUS at 11:12

## 2019-12-07 RX ADMIN — LISINOPRIL 40 MG: 20 TABLET ORAL at 08:12

## 2019-12-07 RX ADMIN — FUROSEMIDE 40 MG: 10 INJECTION, SOLUTION INTRAMUSCULAR; INTRAVENOUS at 09:12

## 2019-12-07 RX ADMIN — INSULIN ASPART 2 UNITS: 100 INJECTION, SOLUTION INTRAVENOUS; SUBCUTANEOUS at 04:12

## 2019-12-07 RX ADMIN — ONDANSETRON 4 MG: 2 INJECTION INTRAMUSCULAR; INTRAVENOUS at 11:12

## 2019-12-07 RX ADMIN — ASPIRIN 325 MG ORAL TABLET 325 MG: 325 PILL ORAL at 08:12

## 2019-12-07 RX ADMIN — ESCITALOPRAM OXALATE 10 MG: 10 TABLET ORAL at 08:12

## 2019-12-07 RX ADMIN — POTASSIUM CHLORIDE 50 MEQ: 750 CAPSULE, EXTENDED RELEASE ORAL at 11:12

## 2019-12-07 RX ADMIN — FUROSEMIDE 20 MG: 10 INJECTION, SOLUTION INTRAMUSCULAR; INTRAVENOUS at 01:12

## 2019-12-07 RX ADMIN — IPRATROPIUM BROMIDE AND ALBUTEROL SULFATE 3 ML: .5; 3 SOLUTION RESPIRATORY (INHALATION) at 12:12

## 2019-12-07 RX ADMIN — CARVEDILOL 6.25 MG: 6.25 TABLET, FILM COATED ORAL at 04:12

## 2019-12-07 RX ADMIN — INSULIN ASPART 2 UNITS: 100 INJECTION, SOLUTION INTRAVENOUS; SUBCUTANEOUS at 09:12

## 2019-12-07 RX ADMIN — ATORVASTATIN CALCIUM 40 MG: 20 TABLET, FILM COATED ORAL at 08:12

## 2019-12-07 NOTE — ASSESSMENT & PLAN NOTE
Uncontrolled; A1C 10.2  Home regimen ; lantus 15u qhs, novolo 6 u tid   Start levemir 7 u qhs, aspart 2u tidwm plus SSI

## 2019-12-07 NOTE — NURSING
Patient c/o nausea,patient given ondansetron inj 4 mg IV per DEANGELO Barahona.  Patient also c/o having 2 episodes of loose stools this morning. Dr. England aware of situation.

## 2019-12-07 NOTE — PROGRESS NOTES
Hospital Medicine  Progress Note      Patient Name: Suyapa Connelly  MRN: [id number]  Date of Admission: 12/6/2019     Principal Problem: <principal problem not specified>     Subjective     Admitted 12/6 for acute decompensated heart failure. Diureisng well on lasix 40mg IV BID. Peripheral edema improving. Had episode of acute onset dyspnea overnight with sats at goal on RA, no cute ischemic changes on EKG, but did have mild elevation in trop 0.035, which then normalized on repeat labs (0.026). Denies having had chest pain or presyncope. Sx improved after breathing tx. On day 2 worked with PT, but states had significant HARTMAN. Also notes 2 episodes of watery stool and 1 episode of heaving today. No recent abx use.      Review of Systems     Constitutional: Negative for chills, fatigue and fever.   HENT: Negative for rhinorrhea and sinus pain.    Respiratory: Positive for cough (3 days), chest tightness and shortness of breath. Negative for choking and wheezing.    Cardiovascular: Positive for chest pain, palpitations and leg swelling.   Gastrointestinal: Positive for diarrhea x 2, vomiting x 1. Negative for abdominal pain, constipation, diarrhea, nausea and vomiting.   Genitourinary: Positive for decreased urine volume. Negative for difficulty urinating and dysuria.   Musculoskeletal: Negative for arthralgias and back pain.   Skin: Negative for color change and pallor.   Neurological: Negative for dizziness, syncope, weakness and headaches.   Psychiatric/Behavioral: Negative for agitation and confusion.     Medications  Scheduled Meds:   aspirin  325 mg Oral Daily    atorvastatin  40 mg Oral Daily    carvedilol  6.25 mg Oral BID WM    clopidogrel  75 mg Oral Daily    enoxaparin  40 mg Subcutaneous Daily    escitalopram oxalate  10 mg Oral Daily    famotidine  20 mg Oral BID    furosemide  40 mg Intravenous BID    insulin aspart U-100  2 Units Subcutaneous TIDWM    insulin detemir U-100  10 Units Subcutaneous  QHS    lisinopril  40 mg Oral Daily    polyethylene glycol  17 g Oral Daily     Continuous Infusions:  PRN Meds:.acetaminophen, acetaminophen, albuterol-ipratropium, calcium carbonate, Dextrose 10% Bolus, Dextrose 10% Bolus, GI cocktail (mylanta 30 mL, lidocaine 2 % viscous 10 mL, dicyclomine 10 mL) 50 mL, glucagon (human recombinant), glucose, glucose, INV hydrALAZINE, hydrOXYzine HCl, insulin aspart U-100, melatonin, ondansetron, prochlorperazine, sodium chloride 0.9%, sodium chloride 0.9%    Objective    Physical Examination    Temp:  [98.7 °F (37.1 °C)-99.2 °F (37.3 °C)]   Pulse:  []   Resp:  [14-28]   BP: (130-185)/(66-90)   SpO2:  [94 %-100 %]     Gen: NAD, conversant  Head: NC, AT  Eyes: PERRLA, EOMI  Throat: MMM, OP clear  CV: RRR, no M/R/G, 1+ pitting edema b/l LE (Lt>Rt chronic) extending to mid shin.   Resp:coarse breathe sounds at abses, no increased work of breathing on room air  GI: Soft, NT, ND, +BS  Ext: MAEW, no c/c/e  Neuro: AAOx3, CN grossly intact, no focal neurologic deficits  Psychiatry: Normal mood, normal affect, no SI/HI    CBC  Recent Labs   Lab 12/06/19  0930 12/07/19  0133   WBC 7.33 8.80   HGB 11.7* 12.1   HCT 37.1 37.9    368*     CMP  Recent Labs   Lab 12/06/19  0930 12/07/19  0132    139   K 3.7 3.1*    101   CO2 23 26   BUN 19 15   CREATININE 1.0 0.9   * 240*   CALCIUM 9.1 9.3   MG  --  1.6   ALKPHOS 120  --    ALT 15  --    AST 16  --    ALBUMIN 2.5*  --    PROT 7.4  --    BILITOT 0.6  --    INR 0.9  --            Hospital Course:  See above    Assessment and Plan:    Shortness of breath  Acute on chronic HF  --unclear if has decreased EF as no TTE in system  --likely ICM w hx of MI  --hypervolemic in ED with peripheral edema and crackles, and evidence of pulm edema on imaging  --CTA neg for PE - which was on differential for SOB  --Flu Neg  --BNP may not be significantly elevated in obese pts  --started on lasix 40mg IV in ED with good urine  output  --continue lasix 40mg IV BID  --continue lisinopril, coreg  --TTE ordered; read pending  --telemetry  --strict I/o and daily weights     CAROLIN (generalized anxiety disorder)  --continue home escitalopram and hydroxyzine           Essential hypertension  --continue ACE        Type 2 diabetes mellitus without complication, with long-term current use of insulin  Uncontrolled; A1C 10.2  Home regimen ; lantus 15u qhs, novolo 6 u tid   Increase  levemir 7 u qhs-->10, aspart 2u tidwm plus SSI        Peripheral artery disease  --DAPT, statin       Diet: lovenox  VTE PPX: cardiac/diabetic  Goals of care: full  Dispo; likely home pending clinical improvement    Brittaney England M.D.  Department of Hospital Medicine  [facility]  [phone] (pager)

## 2019-12-07 NOTE — NURSING
Ms. Connelly is alert oriented and denies pain. Her heart rate is above 100bpm, her blood pressure is elevated. She is currently having an echo done in her room. She was assessed to have clear lungs and edema to BLE, she reports she sometimes has fluid in her abdomen when she is overloaded. She had a good response to lasix in the ED and according to ED nurse urine output was 1500ml.

## 2019-12-07 NOTE — PLAN OF CARE
Patient  AAOX4 vital signs stable. Safety and infection precautions taken. Patient is comfortable at this time,bed is locked and in a low position with call light in reach. Will cont to monitor.

## 2019-12-07 NOTE — PLAN OF CARE
No further complaints this shift.  Cardiac monitoring remains in progress.  No further signs of distress this shift.  Family at bedside.  No falls or injuries this shift.  Will continue to monitor.

## 2019-12-07 NOTE — ASSESSMENT & PLAN NOTE
Acute on chronic HF  --unclear if has decreased EF as no TTE in system  --likely ICM   --hypervolemic in ED with peripheral edema and crackles, and evidence of pulm edema on imaging  --CTA neg for PE - which was on differential for SOB  --BNP may not be significantly elevated in obese pts  --started on lasix 40mg IV in ED with good urine output  --continue lasix 40mg IV BID  --continue lisinopril  --TTE ordered  --telemetry  --strict I/o and daily weights  --

## 2019-12-07 NOTE — NURSING
Pt states SOB better at this time.  Chest pain better at this.  Updated pt and  on POC.  Medications given.  Will continue to monitor.

## 2019-12-07 NOTE — HPI
"Ms. Connelly is a 55 yo F hx of DM2 (A1c 10.2) on insulin, CAD s/p MI (2012), HTN, PAD s/p stent, heart failure (unknown EF- not available in records or care everywhere), anxiety/depression who presents with SOB since 1am last night.     Pt reports episodes of presyncope with exertion over the past "few months." Yesterday she experienced "fluttering" in her chest while at rest, but no pain. This morning she awoke at around 1am with severe SOB. Says she was unable to get comfortable in any position. Notes having had chest "pressure" across the superior part of chest and a brief episode of sternal squeezing like pain which self resolved, and a 3 day hx of progressive bilat lower extremity swelling and increased abdominal distention. Denies productive cough/congestion. Feels different than prior MI.  No nausea/vomiting/diarhea, denies fever/chills, sick contacts.      In ED she was found to have stable vital signs and O2 sats at goal. PET214, trop 0.023, Cr 1, Flu NEG. CXR revealed interstitial edema vs PNA,. CTA neg for PE. EKG with sinus tach and PVCs.   "

## 2019-12-07 NOTE — PLAN OF CARE
Problem: Occupational Therapy Goal  Goal: Occupational Therapy Goal  Description  Pt is not currently displaying a need for acute OT services. D/C acute OT services and recommend pt D/C home.   Outcome: Met Roque Plummer OTR/L  12/7/2019

## 2019-12-07 NOTE — PT/OT/SLP EVAL
Occupational Therapy   Evaluation and Discharge Note    Name: Suyapa Connelly  MRN: [id number]  Admitting Diagnosis:  <principal problem not specified>      Recommendations:     Discharge Recommendations: home, outpatient PT  Discharge Equipment Recommendations:  none  Barriers to discharge:  None    Assessment:     Suyapa Connelly is a 54 y.o. female with a medical diagnosis of <principal problem not specified>. At this time, patient is functioning at their prior level of function and does not require further acute OT services.     Plan:     During this hospitalization, patient does not require further acute OT services.  Please re-consult if situation changes.    · Plan of Care Reviewed with: patient    Subjective     Chief Complaint: No complaints   Patient/Family Comments/goals: return home.    Occupational Profile:  Living Environment: Pt lives w/ spouse and mother-in-law in a h w/ 4 steps to enter and L HR.   Previous level of function: Indep  Roles and Routines: N/A  Equipment Used at home:  none  Assistance upon Discharge: Pt has assistance upon D/C,.     Pain/Comfort:  · Pain Rating 1: 0/10  · Pain Rating Post-Intervention 1: 0/10    Patients cultural, spiritual, Christianity conflicts given the current situation:      Objective:     Communicated with: RN prior to session.  Patient found toileting at toilet with   upon OT entry to room.    General Precautions: Standard, fall   Orthopedic Precautions:N/A   Braces: N/A     Occupational Performance:      Functional Mobility/Transfers:  · Patient completed Toilet Transfer Step Transfer technique with independence with  no AD  · Functional Mobility: Pt ambulated at sba in hallway w/ PT. Refer to PT note for details.     Activities of Daily Living:  · Toileting: independence      Cognitive/Visual Perceptual:  Cognitive/Psychosocial Skills:     -       Oriented to: Person, Place, Time and Situation   -       Follows Commands/attention:Follows multistep  commands  -        Communication: clear/fluent  -       Memory: No Deficits noted  -       Safety awareness/insight to disability: intact   -       Mood/Affect/Coping skills/emotional control: Appropriate to situation  Visual/Perceptual:      -Intact      Physical Exam:  Balance:    -       sba for ambulation  Postural examination/scapula alignment:    -       Rounded shoulders  Skin integrity: Visible skin intact  Upper Extremity Range of Motion:     -       Right Upper Extremity: WFL  -       Left Upper Extremity: WFL  Upper Extremity Strength:    -       Right Upper Extremity: WFL  -       Left Upper Extremity: WFL   Strength:    -       Right Upper Extremity: WFL  -       Left Upper Extremity: WFL  Fine Motor Coordination:    -       Intact  Gross motor coordination:   WFL    AMPAC 6 Click ADL:  AMPAC Total Score: 24    Treatment & Education:  Pt educated on POC.   Education:    Patient left seated EOB with all lines intact and call button in reach    GOALS:   Multidisciplinary Problems     Occupational Therapy Goals     Not on file          Multidisciplinary Problems (Resolved)        Problem: Occupational Therapy Goal    Goal Priority Disciplines Outcome Interventions   Occupational Therapy Goal   (Resolved)     OT, PT/OT Met    Description:  Pt is not currently displaying a need for acute OT services. D/C acute OT services and recommend pt D/C home.                    History:     Past Medical History:   Diagnosis Date    Anxiety     Depression     Diabetes mellitus     type 2    GERD (gastroesophageal reflux disease)     Hyperlipemia     Hypertension     Myocardial infarction 2010    minor-caused by stress per pt.       Past Surgical History:   Procedure Laterality Date    Angiogram - Right Extremity Right 7/9/15    angiogram-left leg  10/6/15       Time Tracking:     OT Date of Treatment: 12/07/19  OT Start Time: 1002  OT Stop Time: 1010  OT Total Time (min): 8 min    Billable Minutes:Evaluation 8  minutes    Roque Plummer, OT  12/7/2019

## 2019-12-07 NOTE — NURSING
Pt complaining of SOB.  Pulse ox reading 100%.  Applied oxygen 2L NC to patient.  Pt appears SOB at this time.  Notified VENANCIO Pal.  Pt started complaining of chest pain.  VS reported to WENDI Parmar.  Respiratory called to give PRN breathing treatment.

## 2019-12-07 NOTE — SUBJECTIVE & OBJECTIVE
"Past Medical History:   Diagnosis Date    Anxiety     Depression     Diabetes mellitus     type 2    GERD (gastroesophageal reflux disease)     Hyperlipemia     Hypertension     Myocardial infarction 2010    minor-caused by stress per pt.       Past Surgical History:   Procedure Laterality Date    Angiogram - Right Extremity Right 7/9/15    angiogram-left leg  10/6/15       Review of patient's allergies indicates:   Allergen Reactions    Pcn [penicillins]      rash       No current facility-administered medications on file prior to encounter.      Current Outpatient Medications on File Prior to Encounter   Medication Sig    blood sugar diagnostic Strp test  blood glucose 2 (two) times daily with meals.    blood-glucose meter Misc use as directed    insulin syringe-needle U-100 0.3 mL 30 gauge x 5/16 Syrg 1 each by Misc.(Non-Drug; Combo Route) route 2 (two) times daily with meals.    lancets 30 gauge Misc use to test blood glucose 2 (two) times daily with meals.    pen needle, diabetic 32 gauge x 5/32" Ndle 1 each by Misc.(Non-Drug; Combo Route) route 2 (two) times daily with meals.    aspirin 325 MG tablet Take 1 tablet (325 mg total) by mouth once daily.    atorvastatin (LIPITOR) 40 MG tablet Take 1 tablet (40 mg total) by mouth once daily.    carvedilol (COREG) 6.25 MG tablet Take 1 tablet (6.25 mg total) by mouth 2 (two) times daily with meals.    clopidogrel (PLAVIX) 75 mg tablet Take 1 tablet (75 mg total) by mouth once daily.    escitalopram oxalate (LEXAPRO) 10 MG tablet Take 1 tablet (10 mg total) by mouth once daily.    famotidine (PEPCID) 20 MG tablet Take 1 tablet (20 mg total) by mouth 2 (two) times daily.    gabapentin (NEURONTIN) 300 MG capsule Take 1 capsule (300 mg total) by mouth 3 (three) times daily.    hydrOXYzine pamoate (VISTARIL) 50 MG Cap Take 1 capsule (50 mg total) by mouth every 6 (six) hours as needed (anxiety).    insulin aspart U-100 (NOVOLOG) 100 unit/mL " injection Inject 9 Units into the skin 3 (three) times daily with meals.    insulin glargine (LANTUS SOLOSTAR) 100 unit/mL (3 mL) InPn pen Inject 50 Units into the skin every evening.    lisinopril (PRINIVIL,ZESTRIL) 40 MG tablet Take 1 tablet (40 mg total) by mouth once daily.    ondansetron (ZOFRAN-ODT) 8 MG TbDL Take 1 tablet (8 mg total) by mouth every 8 (eight) hours as needed.    promethazine (PHENERGAN) 12.5 MG Tab Take 1 tablet (12.5 mg total) by mouth every 6 (six) hours as needed (nausea).    [DISCONTINUED] lancing device Misc 1 Device by Misc.(Non-Drug; Combo Route) route 2 (two) times daily with meals.     Family History     None        Tobacco Use    Smoking status: Never Smoker    Smokeless tobacco: Never Used   Substance and Sexual Activity    Alcohol use: No    Drug use: Yes     Types: Marijuana     Comment: used yesterday    Sexual activity: Not on file     Review of Systems   Constitutional: Negative for chills, fatigue and fever.   HENT: Negative for rhinorrhea and sinus pain.    Respiratory: Positive for cough (3 days), chest tightness and shortness of breath. Negative for choking and wheezing.    Cardiovascular: Positive for chest pain, palpitations and leg swelling.   Gastrointestinal: Positive for abdominal distention. Negative for abdominal pain, constipation, diarrhea, nausea and vomiting.   Genitourinary: Positive for decreased urine volume. Negative for difficulty urinating and dysuria.   Musculoskeletal: Negative for arthralgias and back pain.   Skin: Negative for color change and pallor.   Neurological: Negative for dizziness, syncope, weakness and headaches.   Psychiatric/Behavioral: Negative for agitation and confusion.     Objective:     Vital Signs (Most Recent):  Temp: 98.7 °F (37.1 °C) (12/06/19 1730)  Pulse: 100 (12/06/19 1800)  Resp: 18 (12/06/19 1730)  BP: (!) 171/90 (12/06/19 1730)  SpO2: 100 % (12/06/19 1730) Vital Signs (24h Range):  Temp:  [98.4 °F (36.9 °C)-98.7  °F (37.1 °C)] 98.7 °F (37.1 °C)  Pulse:  [] 100  Resp:  [18-20] 18  SpO2:  [96 %-100 %] 100 %  BP: (141-173)/(78-92) 171/90     Weight: 99 kg (218 lb 4.1 oz)  Body mass index is 37.46 kg/m².    Physical Exam   Constitutional: She is oriented to person, place, and time. She appears well-developed. No distress.   obese   HENT:   Head: Normocephalic and atraumatic.   Mouth/Throat: No oropharyngeal exudate.   Eyes: Pupils are equal, round, and reactive to light. Conjunctivae and EOM are normal.   Neck: Normal range of motion. Neck supple.   Cardiovascular: Normal rate, regular rhythm and normal heart sounds.   Pulmonary/Chest: Effort normal and breath sounds normal. No respiratory distress.   Rales at bases no wheezing   Abdominal: Soft. Bowel sounds are normal. She exhibits no distension. There is no tenderness.   Musculoskeletal: Normal range of motion. She exhibits edema (1+ pitting b/l lE left>Rt).   Neurological: She is alert and oriented to person, place, and time. No cranial nerve deficit.   Skin: Skin is warm and dry.   Psychiatric: She has a normal mood and affect. Her behavior is normal.         CRANIAL NERVES     CN III, IV, VI   Pupils are equal, round, and reactive to light.  Extraocular motions are normal.        Significant Labs: All pertinent labs within the past 24 hours have been reviewed.    Significant Imaging: I have reviewed and interpreted all pertinent imaging results/findings within the past 24 hours.

## 2019-12-07 NOTE — H&P
"[facility] Hospital Medicine  History & Physical    Patient Name: Suyapa Connelly  MRN: [id number]  Admission Date: 12/6/2019  Attending Physician: Brittaney England*   Primary Care Provider: Erlinda Rahman NP    [facility] Medicine Team: [facility]OSP MED R Brittaney England MD     Patient information was obtained from past medical records and ER records.     Subjective:     Principal Problem acute on chronic decompensated HF  Chief Complaint:   Chief Complaint   Patient presents with    Shortness of Breath     sob since 1am with leg swelling x 3 days. on fluid pills.         HPI: Ms. Connelyl is a 55 yo F hx of DM2 (A1c 10.2) on insulin, CAD s/p MI (2012), HTN, PAD s/p stent, heart failure (unknown EF- not available in records or care everywhere), anxiety/depression who presents with SOB since 1am last night.     Pt reports episodes of presyncope with exertion over the past "few months." Yesterday she experienced "fluttering" in her chest while at rest, but no pain. This morning she awoke at around 1am with severe SOB. Says she was unable to get comfortable in any position. Notes having had chest "pressure" across the superior part of chest and a brief episode of sternal squeezing like pain which self resolved, and a 3 day hx of progressive bilat lower extremity swelling and increased abdominal distention. Denies productive cough/congestion. Feels different than prior MI.  No nausea/vomiting/diarhea, denies fever/chills, sick contacts.      In ED she was found to have stable vital signs and O2 sats at goal. [id number], trop 0.023, Cr 1, Flu NEG. CXR revealed interstitial edema vs PNA,. CTA neg for PE. EKG with sinus tach and PVCs.     Past Medical History:   Diagnosis Date    Anxiety     Depression     Diabetes mellitus     type 2    GERD (gastroesophageal reflux disease)     Hyperlipemia     Hypertension     Myocardial infarction 2010    minor-caused by stress per pt.       Past " "Surgical History:   Procedure Laterality Date    Angiogram - Right Extremity Right 7/9/15    angiogram-left leg  10/6/15       Review of patient's allergies indicates:   Allergen Reactions    Pcn [penicillins]      rash       No current facility-administered medications on file prior to encounter.      Current Outpatient Medications on File Prior to Encounter   Medication Sig    blood sugar diagnostic Strp test  blood glucose 2 (two) times daily with meals.    blood-glucose meter Misc use as directed    insulin syringe-needle U-100 0.3 mL 30 gauge x 5/16 Syrg 1 each by Misc.(Non-Drug; Combo Route) route 2 (two) times daily with meals.    lancets 30 gauge Misc use to test blood glucose 2 (two) times daily with meals.    pen needle, diabetic 32 gauge x 5/32" Ndle 1 each by Misc.(Non-Drug; Combo Route) route 2 (two) times daily with meals.    aspirin 325 MG tablet Take 1 tablet (325 mg total) by mouth once daily.    atorvastatin (LIPITOR) 40 MG tablet Take 1 tablet (40 mg total) by mouth once daily.    carvedilol (COREG) 6.25 MG tablet Take 1 tablet (6.25 mg total) by mouth 2 (two) times daily with meals.    clopidogrel (PLAVIX) 75 mg tablet Take 1 tablet (75 mg total) by mouth once daily.    escitalopram oxalate (LEXAPRO) 10 MG tablet Take 1 tablet (10 mg total) by mouth once daily.    famotidine (PEPCID) 20 MG tablet Take 1 tablet (20 mg total) by mouth 2 (two) times daily.    gabapentin (NEURONTIN) 300 MG capsule Take 1 capsule (300 mg total) by mouth 3 (three) times daily.    hydrOXYzine pamoate (VISTARIL) 50 MG Cap Take 1 capsule (50 mg total) by mouth every 6 (six) hours as needed (anxiety).    insulin aspart U-100 (NOVOLOG) 100 unit/mL injection Inject 9 Units into the skin 3 (three) times daily with meals.    insulin glargine (LANTUS SOLOSTAR) 100 unit/mL (3 mL) InPn pen Inject 50 Units into the skin every evening.    lisinopril (PRINIVIL,ZESTRIL) 40 MG tablet Take 1 tablet (40 mg total) by " mouth once daily.    ondansetron (ZOFRAN-ODT) 8 MG TbDL Take 1 tablet (8 mg total) by mouth every 8 (eight) hours as needed.    promethazine (PHENERGAN) 12.5 MG Tab Take 1 tablet (12.5 mg total) by mouth every 6 (six) hours as needed (nausea).    [DISCONTINUED] lancing device Misc 1 Device by Misc.(Non-Drug; Combo Route) route 2 (two) times daily with meals.     Family History     None        Tobacco Use    Smoking status: Never Smoker    Smokeless tobacco: Never Used   Substance and Sexual Activity    Alcohol use: No    Drug use: Yes     Types: Marijuana     Comment: used yesterday    Sexual activity: Not on file     Review of Systems   Constitutional: Negative for chills, fatigue and fever.   HENT: Negative for rhinorrhea and sinus pain.    Respiratory: Positive for cough (3 days), chest tightness and shortness of breath. Negative for choking and wheezing.    Cardiovascular: Positive for chest pain, palpitations and leg swelling.   Gastrointestinal: Positive for abdominal distention. Negative for abdominal pain, constipation, diarrhea, nausea and vomiting.   Genitourinary: Positive for decreased urine volume. Negative for difficulty urinating and dysuria.   Musculoskeletal: Negative for arthralgias and back pain.   Skin: Negative for color change and pallor.   Neurological: Negative for dizziness, syncope, weakness and headaches.   Psychiatric/Behavioral: Negative for agitation and confusion.     Objective:     Vital Signs (Most Recent):  Temp: 98.7 °F (37.1 °C) (12/06/19 1730)  Pulse: 100 (12/06/19 1800)  Resp: 18 (12/06/19 1730)  BP: (!) 171/90 (12/06/19 1730)  SpO2: 100 % (12/06/19 1730) Vital Signs (24h Range):  Temp:  [98.4 °F (36.9 °C)-98.7 °F (37.1 °C)] 98.7 °F (37.1 °C)  Pulse:  [] 100  Resp:  [18-20] 18  SpO2:  [96 %-100 %] 100 %  BP: (141-173)/(78-92) 171/90     Weight: 99 kg (218 lb 4.1 oz)  Body mass index is 37.46 kg/m².    Physical Exam   Constitutional: She is oriented to person,  place, and time. She appears well-developed. No distress.   obese   HENT:   Head: Normocephalic and atraumatic.   Mouth/Throat: No oropharyngeal exudate.   Eyes: Pupils are equal, round, and reactive to light. Conjunctivae and EOM are normal.   Neck: Normal range of motion. Neck supple.   Cardiovascular: Normal rate, regular rhythm and normal heart sounds.   Pulmonary/Chest: Effort normal and breath sounds normal. No respiratory distress.   Rales at bases no wheezing   Abdominal: Soft. Bowel sounds are normal. She exhibits no distension. There is no tenderness.   Musculoskeletal: Normal range of motion. She exhibits edema (1+ pitting b/l lE left>Rt).   Neurological: She is alert and oriented to person, place, and time. No cranial nerve deficit.   Skin: Skin is warm and dry.   Psychiatric: She has a normal mood and affect. Her behavior is normal.         CRANIAL NERVES     CN III, IV, VI   Pupils are equal, round, and reactive to light.  Extraocular motions are normal.        Significant Labs: All pertinent labs within the past 24 hours have been reviewed.    Significant Imaging: I have reviewed and interpreted all pertinent imaging results/findings within the past 24 hours.    Assessment/Plan:     Shortness of breath  Acute on chronic HF  --unclear if has decreased EF as no TTE in system  --likely ICM   --hypervolemic in ED with peripheral edema and crackles, and evidence of pulm edema on imaging  --CTA neg for PE - which was on differential for SOB  --BNP may not be significantly elevated in obese pts  --started on lasix 40mg IV in ED with good urine output  --continue lasix 40mg IV BID  --continue lisinopril  --TTE ordered  --telemetry  --strict I/o and daily weights  --    CAROLIN (generalized anxiety disorder)  --continue home escitalopram and hydroxyzine        Essential hypertension  --continue ACE      Type 2 diabetes mellitus without complication, with long-term current use of insulin  Uncontrolled; A1C  10.2  Home regimen ; lantus 15u qhs, novolo 6 u tid   Start levemir 7 u qhs, aspart 2u tidwm plus SSI      Peripheral artery disease  --DAPT, statin        VTE Risk Mitigation (From admission, onward)         Ordered     enoxaparin injection 40 mg  Daily      12/06/19 1513     IP VTE HIGH RISK PATIENT  Once      12/06/19 1513                   Brittaney England MD  Department of Hospital Medicine   [facility]

## 2019-12-07 NOTE — PLAN OF CARE
Mrs. Connelly is aware of the plan of care. She has much improved edema to BLE and was given another dose of lasix and dose of carvedilol. Her family is here and are aware she had her ECHO but the results are not available yet.

## 2019-12-08 PROBLEM — I50.43 ACUTE ON CHRONIC COMBINED SYSTOLIC AND DIASTOLIC HEART FAILURE: Status: ACTIVE | Noted: 2019-12-08

## 2019-12-08 LAB
ANION GAP SERPL CALC-SCNC: 11 MMOL/L (ref 8–16)
BASOPHILS # BLD AUTO: 0.08 K/UL (ref 0–0.2)
BASOPHILS NFR BLD: 1.3 % (ref 0–1.9)
BUN SERPL-MCNC: 18 MG/DL (ref 6–20)
CALCIUM SERPL-MCNC: 8.8 MG/DL (ref 8.7–10.5)
CHLORIDE SERPL-SCNC: 104 MMOL/L (ref 95–110)
CO2 SERPL-SCNC: 24 MMOL/L (ref 23–29)
CREAT SERPL-MCNC: 1 MG/DL (ref 0.5–1.4)
DIFFERENTIAL METHOD: ABNORMAL
EOSINOPHIL # BLD AUTO: 0.1 K/UL (ref 0–0.5)
EOSINOPHIL NFR BLD: 1.4 % (ref 0–8)
ERYTHROCYTE [DISTWIDTH] IN BLOOD BY AUTOMATED COUNT: 13.2 % (ref 11.5–14.5)
EST. GFR  (AFRICAN AMERICAN): >60 ML/MIN/1.73 M^2
EST. GFR  (NON AFRICAN AMERICAN): >60 ML/MIN/1.73 M^2
GLUCOSE SERPL-MCNC: 168 MG/DL (ref 70–110)
HCT VFR BLD AUTO: 36.8 % (ref 37–48.5)
HGB BLD-MCNC: 11.3 G/DL (ref 12–16)
IMM GRANULOCYTES # BLD AUTO: 0.01 K/UL (ref 0–0.04)
IMM GRANULOCYTES NFR BLD AUTO: 0.2 % (ref 0–0.5)
LYMPHOCYTES # BLD AUTO: 2.5 K/UL (ref 1–4.8)
LYMPHOCYTES NFR BLD: 39.5 % (ref 18–48)
MAGNESIUM SERPL-MCNC: 1.8 MG/DL (ref 1.6–2.6)
MAGNESIUM SERPL-MCNC: 1.8 MG/DL (ref 1.6–2.6)
MCH RBC QN AUTO: 30 PG (ref 27–31)
MCHC RBC AUTO-ENTMCNC: 30.7 G/DL (ref 32–36)
MCV RBC AUTO: 98 FL (ref 82–98)
MONOCYTES # BLD AUTO: 0.6 K/UL (ref 0.3–1)
MONOCYTES NFR BLD: 10.1 % (ref 4–15)
NEUTROPHILS # BLD AUTO: 3 K/UL (ref 1.8–7.7)
NEUTROPHILS NFR BLD: 47.5 % (ref 38–73)
NRBC BLD-RTO: 0 /100 WBC
PLATELET # BLD AUTO: 342 K/UL (ref 150–350)
PMV BLD AUTO: 10.2 FL (ref 9.2–12.9)
POCT GLUCOSE: 159 MG/DL (ref 70–110)
POCT GLUCOSE: 161 MG/DL (ref 70–110)
POCT GLUCOSE: 203 MG/DL (ref 70–110)
POCT GLUCOSE: 205 MG/DL (ref 70–110)
POTASSIUM SERPL-SCNC: 3.4 MMOL/L (ref 3.5–5.1)
RBC # BLD AUTO: 3.77 M/UL (ref 4–5.4)
SODIUM SERPL-SCNC: 139 MMOL/L (ref 136–145)
WBC # BLD AUTO: 6.25 K/UL (ref 3.9–12.7)

## 2019-12-08 PROCEDURE — 99232 SBSQ HOSP IP/OBS MODERATE 35: CPT | Mod: ,,, | Performed by: INTERNAL MEDICINE

## 2019-12-08 PROCEDURE — 93010 EKG 12-LEAD: ICD-10-PCS | Mod: ,,, | Performed by: INTERNAL MEDICINE

## 2019-12-08 PROCEDURE — 25000003 PHARM REV CODE 250: Performed by: INTERNAL MEDICINE

## 2019-12-08 PROCEDURE — 93010 ELECTROCARDIOGRAM REPORT: CPT | Mod: ,,, | Performed by: INTERNAL MEDICINE

## 2019-12-08 PROCEDURE — 96372 THER/PROPH/DIAG INJ SC/IM: CPT

## 2019-12-08 PROCEDURE — 63600175 PHARM REV CODE 636 W HCPCS: Performed by: INTERNAL MEDICINE

## 2019-12-08 PROCEDURE — 36415 COLL VENOUS BLD VENIPUNCTURE: CPT

## 2019-12-08 PROCEDURE — 25000242 PHARM REV CODE 250 ALT 637 W/ HCPCS: Performed by: PHYSICIAN ASSISTANT

## 2019-12-08 PROCEDURE — 96376 TX/PRO/DX INJ SAME DRUG ADON: CPT

## 2019-12-08 PROCEDURE — 85025 COMPLETE CBC W/AUTO DIFF WBC: CPT

## 2019-12-08 PROCEDURE — G0378 HOSPITAL OBSERVATION PER HR: HCPCS

## 2019-12-08 PROCEDURE — 94761 N-INVAS EAR/PLS OXIMETRY MLT: CPT

## 2019-12-08 PROCEDURE — 94640 AIRWAY INHALATION TREATMENT: CPT

## 2019-12-08 PROCEDURE — 93005 ELECTROCARDIOGRAM TRACING: CPT

## 2019-12-08 PROCEDURE — 99232 PR SUBSEQUENT HOSPITAL CARE,LEVL II: ICD-10-PCS | Mod: ,,, | Performed by: INTERNAL MEDICINE

## 2019-12-08 PROCEDURE — 80048 BASIC METABOLIC PNL TOTAL CA: CPT

## 2019-12-08 PROCEDURE — 83735 ASSAY OF MAGNESIUM: CPT

## 2019-12-08 RX ORDER — POTASSIUM CHLORIDE 750 MG/1
50 CAPSULE, EXTENDED RELEASE ORAL ONCE
Status: COMPLETED | OUTPATIENT
Start: 2019-12-08 | End: 2019-12-08

## 2019-12-08 RX ORDER — FUROSEMIDE 40 MG/1
80 TABLET ORAL 2 TIMES DAILY
Status: DISCONTINUED | OUTPATIENT
Start: 2019-12-08 | End: 2019-12-09 | Stop reason: HOSPADM

## 2019-12-08 RX ORDER — LANOLIN ALCOHOL/MO/W.PET/CERES
400 CREAM (GRAM) TOPICAL ONCE
Status: COMPLETED | OUTPATIENT
Start: 2019-12-08 | End: 2019-12-08

## 2019-12-08 RX ADMIN — Medication 6 MG: at 09:12

## 2019-12-08 RX ADMIN — HYDROXYZINE HYDROCHLORIDE 25 MG: 25 TABLET, FILM COATED ORAL at 05:12

## 2019-12-08 RX ADMIN — FUROSEMIDE 80 MG: 40 TABLET ORAL at 12:12

## 2019-12-08 RX ADMIN — CARVEDILOL 6.25 MG: 6.25 TABLET, FILM COATED ORAL at 09:12

## 2019-12-08 RX ADMIN — ESCITALOPRAM OXALATE 10 MG: 10 TABLET ORAL at 09:12

## 2019-12-08 RX ADMIN — FAMOTIDINE 20 MG: 20 TABLET ORAL at 09:12

## 2019-12-08 RX ADMIN — CARVEDILOL 6.25 MG: 6.25 TABLET, FILM COATED ORAL at 05:12

## 2019-12-08 RX ADMIN — POTASSIUM CHLORIDE 50 MEQ: 750 CAPSULE, EXTENDED RELEASE ORAL at 09:12

## 2019-12-08 RX ADMIN — INSULIN ASPART 1 UNITS: 100 INJECTION, SOLUTION INTRAVENOUS; SUBCUTANEOUS at 08:12

## 2019-12-08 RX ADMIN — FUROSEMIDE 40 MG: 10 INJECTION, SOLUTION INTRAMUSCULAR; INTRAVENOUS at 09:12

## 2019-12-08 RX ADMIN — Medication 400 MG: at 09:12

## 2019-12-08 RX ADMIN — INSULIN ASPART 2 UNITS: 100 INJECTION, SOLUTION INTRAVENOUS; SUBCUTANEOUS at 09:12

## 2019-12-08 RX ADMIN — INSULIN ASPART 2 UNITS: 100 INJECTION, SOLUTION INTRAVENOUS; SUBCUTANEOUS at 05:12

## 2019-12-08 RX ADMIN — ASPIRIN 325 MG ORAL TABLET 325 MG: 325 PILL ORAL at 09:12

## 2019-12-08 RX ADMIN — INSULIN ASPART 2 UNITS: 100 INJECTION, SOLUTION INTRAVENOUS; SUBCUTANEOUS at 12:12

## 2019-12-08 RX ADMIN — LISINOPRIL 40 MG: 20 TABLET ORAL at 09:12

## 2019-12-08 RX ADMIN — ENOXAPARIN SODIUM 40 MG: 100 INJECTION SUBCUTANEOUS at 05:12

## 2019-12-08 RX ADMIN — FAMOTIDINE 20 MG: 20 TABLET ORAL at 08:12

## 2019-12-08 RX ADMIN — IPRATROPIUM BROMIDE AND ALBUTEROL SULFATE 3 ML: .5; 3 SOLUTION RESPIRATORY (INHALATION) at 04:12

## 2019-12-08 RX ADMIN — ATORVASTATIN CALCIUM 40 MG: 20 TABLET, FILM COATED ORAL at 09:12

## 2019-12-08 RX ADMIN — CLOPIDOGREL BISULFATE 75 MG: 75 TABLET ORAL at 09:12

## 2019-12-08 NOTE — PLAN OF CARE
"Safety measures maintained. VSS on RA. Pt denies pain but did feel "fluttering" and episode of SOB. Resp therapist called to bed side, breathing tx complete. Placed on 2LNC. Shortly after pt had a panic attack and hydroxyzine was administered. Bed in lowest position, call light within reach, side rails up x2. Pt is starting to feel better after med admin. Will continue to monitor.  "

## 2019-12-08 NOTE — NURSING
Pt informed nurse that she suddenly felt fluttering and SOB as she got up to use the restroom. Nurse paged RT for a PRN breathing tx. Also paged on-call PA to notify her about pt's complaints. Will continue to monitor.

## 2019-12-08 NOTE — PROGRESS NOTES
"Hospital Medicine  Progress Note      Patient Name: Suyapa Connelly  MRN: [id number]  Date of Admission: 12/6/2019     Principal Problem: <principal problem not specified>     Subjective  Had another episode of subjective dyspnea with sats at goal on RA last night. She received a breathing tx. Per chart, she then had a panic attack and hydroxyzine was administered. EKG revealed sinus rhythm (83bpm) with PVCs. Complained of "fluttering" in her chest the next morning, but was in sinus rhythm on tele with PVCs. Diuretics switched to PO. On RA this morning in no acute distress. Diarrhea resolved.  concerned about patient's difficulty with ambulation and HARTMAN.Explained we would make sure pt had appropriate follow-up upon discharge tomorrow.        Review of Systems     Constitutional: Negative for chills, fatigue and fever.   HENT: Negative for rhinorrhea and sinus pain.    Respiratory: Positive for cough (3 days), chest tightness and shortness of breath. Negative for choking and wheezing.    Cardiovascular: Positive for chest pain, palpitations and leg swelling.   Gastrointestinal: Positive for diarrhea x 2, vomiting x 1. Negative for abdominal pain, constipation, diarrhea, nausea and vomiting.   Genitourinary: Positive for decreased urine volume. Negative for difficulty urinating and dysuria.   Musculoskeletal: Negative for arthralgias and back pain.   Skin: Negative for color change and pallor.   Neurological: Negative for dizziness, syncope, weakness and headaches.   Psychiatric/Behavioral: Negative for agitation and confusion.     Medications  Scheduled Meds:   aspirin  325 mg Oral Daily    atorvastatin  40 mg Oral Daily    carvedilol  6.25 mg Oral BID WM    clopidogrel  75 mg Oral Daily    enoxaparin  40 mg Subcutaneous Daily    escitalopram oxalate  10 mg Oral Daily    famotidine  20 mg Oral BID    furosemide  80 mg Oral BID    insulin aspart U-100  2 Units Subcutaneous TIDWM    insulin detemir U-100  " 10 Units Subcutaneous QHS    lisinopril  40 mg Oral Daily     Continuous Infusions:  PRN Meds:.acetaminophen, acetaminophen, albuterol-ipratropium, calcium carbonate, Dextrose 10% Bolus, Dextrose 10% Bolus, GI cocktail (mylanta 30 mL, lidocaine 2 % viscous 10 mL, dicyclomine 10 mL) 50 mL, glucagon (human recombinant), glucose, glucose, INV hydrALAZINE, hydrOXYzine HCl, insulin aspart U-100, melatonin, ondansetron, prochlorperazine, sodium chloride 0.9%, sodium chloride 0.9%    Objective    Physical Examination    Temp:  [97.4 °F (36.3 °C)-98.9 °F (37.2 °C)]   Pulse:  [72-88]   Resp:  [16-18]   BP: (125-138)/(63-77)   SpO2:  [95 %-99 %]     Gen: NAD, conversant  Head: NC, AT  Eyes: PERRLA, EOMI  Throat: MMM, OP clear  CV: RRR, no M/R/G, 1+ pitting edema b/l LE (Lt>Rt chronic) extending to mid shin.   Resp:coarse breathe sounds at abses, no increased work of breathing on room air  GI: Soft, NT, ND, +BS  Ext: MAEW, no c/c/e  Neuro: AAOx3, CN grossly intact, no focal neurologic deficits  Psychiatry: Normal mood, normal affect, no SI/HI    CBC  Recent Labs   Lab 12/06/19  0930 12/07/19  0133 12/08/19  0525   WBC 7.33 8.80 6.25   HGB 11.7* 12.1 11.3*   HCT 37.1 37.9 36.8*    368* 342     CMP  Recent Labs   Lab 12/06/19  0930 12/07/19  0132 12/07/19  1643 12/08/19  0525    139 137 139   K 3.7 3.1* 3.5 3.4*    101 101 104   CO2 23 26 25 24   BUN 19 15 17 18   CREATININE 1.0 0.9 1.1 1.0   * 240* 228* 168*   CALCIUM 9.1 9.3 8.8 8.8   MG  --  1.6  --  1.8  1.8   ALKPHOS 120  --   --   --    ALT 15  --   --   --    AST 16  --   --   --    ALBUMIN 2.5*  --   --   --    PROT 7.4  --   --   --    BILITOT 0.6  --   --   --    INR 0.9  --   --   --            Hospital Course:     Admitted 12/6 for acute decompensated heart failure. Diuresed well on lasix 40mg IV BID. Peripheral edema improved. Had episode of acute onset dyspnea overnight with sats at goal on RA, no cute ischemic changes on EKG, but did  "have mild elevation in trop 0.035, which then normalized on repeat labs (0.026). Denied having had chest pain or presyncope. Sx improved after breathing tx. On day 2 worked with PT, but states had significant HARTMAN and the recommended out pt PT/OT. Also notes 2 episodes of watery stool and 1 episode of heaving, which resolved by day 3. TTE reveled EF 35%, GIIDD and PAP 49mmHg.  On her 3rd night had another episode of SOB with sats at goal on RA. EKG revealed sinus rhythm (83bpm) with PVCs. Complained of "fluttering" in her chest the next morning, but was in sinus rhythm on tele with PVCs. Diuretics switched to PO.  Assessment and Plan:    Shortness of breath  Acute on chronic HF  --unclear if has decreased EF as no TTE in system  --likely ICM w hx of MI  --hypervolemic in ED with peripheral edema and crackles, and evidence of pulm edema on imaging  --CTA neg for PE - which was on differential for SOB  --Flu Neg  --, may not be significantly elevated in obese pts  --started on lasix 40mg IV in ED with good urine output  --edema improved, will switch to lasix 80mg po bid  --continue GDMT lisinopril, coreg  --TTE: EF 35%, GIIDD, PAP 49, CVP 3  --renal function stable  --telemetry  --strict I/o and daily weights  --will need cards f/u     CAROLIN (generalized anxiety disorder)  --continue home escitalopram and hydroxyzine           Essential hypertension  --continue ACE        Type 2 diabetes mellitus without complication, with long-term current use of insulin  Uncontrolled; A1C 10.2  Home regimen ; lantus 15u qhs, novolo 6 u tid   Increase  levemir 7 u qhs-->10, aspart 2u tidwm plus SSI        Peripheral artery disease  --DAPT, statin     Pulmonary hypertension  --outpatient sleep study  --cardiology follow-up      Diet: lovenox  VTE PPX: cardiac/diabetic  Goals of care: full  Dispo; likely home pending clinical improvement    Brittaney England M.D.  Department of Hospital Medicine  [facility] " [facility]  [phone] (pager)

## 2019-12-09 VITALS
WEIGHT: 218 LBS | BODY MASS INDEX: 37.22 KG/M2 | OXYGEN SATURATION: 98 % | TEMPERATURE: 98 F | RESPIRATION RATE: 18 BRPM | SYSTOLIC BLOOD PRESSURE: 137 MMHG | HEART RATE: 78 BPM | DIASTOLIC BLOOD PRESSURE: 77 MMHG | HEIGHT: 64 IN

## 2019-12-09 LAB
ANION GAP SERPL CALC-SCNC: 7 MMOL/L (ref 8–16)
BASOPHILS # BLD AUTO: 0.08 K/UL (ref 0–0.2)
BASOPHILS NFR BLD: 1.2 % (ref 0–1.9)
BUN SERPL-MCNC: 20 MG/DL (ref 6–20)
CALCIUM SERPL-MCNC: 8.5 MG/DL (ref 8.7–10.5)
CHLORIDE SERPL-SCNC: 102 MMOL/L (ref 95–110)
CO2 SERPL-SCNC: 27 MMOL/L (ref 23–29)
CREAT SERPL-MCNC: 1.1 MG/DL (ref 0.5–1.4)
DIFFERENTIAL METHOD: ABNORMAL
EOSINOPHIL # BLD AUTO: 0.1 K/UL (ref 0–0.5)
EOSINOPHIL NFR BLD: 1.9 % (ref 0–8)
ERYTHROCYTE [DISTWIDTH] IN BLOOD BY AUTOMATED COUNT: 13.4 % (ref 11.5–14.5)
EST. GFR  (AFRICAN AMERICAN): >60 ML/MIN/1.73 M^2
EST. GFR  (NON AFRICAN AMERICAN): 57.5 ML/MIN/1.73 M^2
GLUCOSE SERPL-MCNC: 185 MG/DL (ref 70–110)
HCT VFR BLD AUTO: 34.3 % (ref 37–48.5)
HGB BLD-MCNC: 10.7 G/DL (ref 12–16)
IMM GRANULOCYTES # BLD AUTO: 0.01 K/UL (ref 0–0.04)
IMM GRANULOCYTES NFR BLD AUTO: 0.2 % (ref 0–0.5)
LYMPHOCYTES # BLD AUTO: 2.7 K/UL (ref 1–4.8)
LYMPHOCYTES NFR BLD: 41 % (ref 18–48)
MAGNESIUM SERPL-MCNC: 1.9 MG/DL (ref 1.6–2.6)
MAGNESIUM SERPL-MCNC: 1.9 MG/DL (ref 1.6–2.6)
MCH RBC QN AUTO: 30.7 PG (ref 27–31)
MCHC RBC AUTO-ENTMCNC: 31.2 G/DL (ref 32–36)
MCV RBC AUTO: 99 FL (ref 82–98)
MONOCYTES # BLD AUTO: 0.6 K/UL (ref 0.3–1)
MONOCYTES NFR BLD: 9.4 % (ref 4–15)
NEUTROPHILS # BLD AUTO: 3 K/UL (ref 1.8–7.7)
NEUTROPHILS NFR BLD: 46.3 % (ref 38–73)
NRBC BLD-RTO: 0 /100 WBC
PLATELET # BLD AUTO: 316 K/UL (ref 150–350)
PMV BLD AUTO: 10.6 FL (ref 9.2–12.9)
POCT GLUCOSE: 200 MG/DL (ref 70–110)
POCT GLUCOSE: 213 MG/DL (ref 70–110)
POTASSIUM SERPL-SCNC: 3.6 MMOL/L (ref 3.5–5.1)
RBC # BLD AUTO: 3.48 M/UL (ref 4–5.4)
SODIUM SERPL-SCNC: 136 MMOL/L (ref 136–145)
WBC # BLD AUTO: 6.46 K/UL (ref 3.9–12.7)

## 2019-12-09 PROCEDURE — G0378 HOSPITAL OBSERVATION PER HR: HCPCS

## 2019-12-09 PROCEDURE — 96372 THER/PROPH/DIAG INJ SC/IM: CPT

## 2019-12-09 PROCEDURE — 85025 COMPLETE CBC W/AUTO DIFF WBC: CPT

## 2019-12-09 PROCEDURE — 80048 BASIC METABOLIC PNL TOTAL CA: CPT

## 2019-12-09 PROCEDURE — 25000003 PHARM REV CODE 250: Performed by: INTERNAL MEDICINE

## 2019-12-09 PROCEDURE — 36415 COLL VENOUS BLD VENIPUNCTURE: CPT

## 2019-12-09 PROCEDURE — 99226 PR SUBSEQUENT OBSERVATION CARE,LEVEL III: CPT | Mod: ,,, | Performed by: INTERNAL MEDICINE

## 2019-12-09 PROCEDURE — 99226 PR SUBSEQUENT OBSERVATION CARE,LEVEL III: ICD-10-PCS | Mod: ,,, | Performed by: INTERNAL MEDICINE

## 2019-12-09 PROCEDURE — 83735 ASSAY OF MAGNESIUM: CPT

## 2019-12-09 RX ORDER — PEN NEEDLE, DIABETIC 30 GX3/16"
30 NEEDLE, DISPOSABLE MISCELLANEOUS 2 TIMES DAILY WITH MEALS
Qty: 100 EACH | Refills: 0 | Status: ON HOLD | OUTPATIENT
Start: 2019-12-09 | End: 2020-02-04

## 2019-12-09 RX ORDER — INSULIN ASPART 100 [IU]/ML
9 INJECTION, SOLUTION INTRAVENOUS; SUBCUTANEOUS
Qty: 10 ML | Refills: 0 | Status: SHIPPED | OUTPATIENT
Start: 2019-12-09 | End: 2019-12-09

## 2019-12-09 RX ORDER — LISINOPRIL 40 MG/1
40 TABLET ORAL DAILY
Qty: 30 TABLET | Refills: 0 | Status: ON HOLD | OUTPATIENT
Start: 2019-12-09 | End: 2020-02-04 | Stop reason: HOSPADM

## 2019-12-09 RX ORDER — PEN NEEDLE, DIABETIC 30 GX3/16"
1 NEEDLE, DISPOSABLE MISCELLANEOUS 2 TIMES DAILY WITH MEALS
Qty: 100 EACH | Refills: 0 | Status: ON HOLD | COMMUNITY
Start: 2019-12-09 | End: 2020-02-04

## 2019-12-09 RX ORDER — ATORVASTATIN CALCIUM 40 MG/1
40 TABLET, FILM COATED ORAL DAILY
Qty: 30 TABLET | Refills: 0 | Status: ON HOLD | OUTPATIENT
Start: 2019-12-09 | End: 2019-12-18 | Stop reason: SDUPTHER

## 2019-12-09 RX ORDER — ONDANSETRON 8 MG/1
8 TABLET, ORALLY DISINTEGRATING ORAL EVERY 8 HOURS PRN
Qty: 30 TABLET | Refills: 1 | Status: SHIPPED | OUTPATIENT
Start: 2019-12-09 | End: 2020-03-24

## 2019-12-09 RX ORDER — FUROSEMIDE 80 MG/1
80 TABLET ORAL DAILY
Qty: 30 TABLET | Refills: 0 | Status: SHIPPED | OUTPATIENT
Start: 2019-12-09 | End: 2019-12-13 | Stop reason: ALTCHOICE

## 2019-12-09 RX ORDER — HYDROXYZINE PAMOATE 50 MG/1
50 CAPSULE ORAL EVERY 6 HOURS PRN
Qty: 120 CAPSULE | Refills: 0 | Status: ON HOLD | OUTPATIENT
Start: 2019-12-09 | End: 2020-02-04 | Stop reason: HOSPADM

## 2019-12-09 RX ORDER — PEN NEEDLE, DIABETIC 29 G X1/2"
30 NEEDLE, DISPOSABLE MISCELLANEOUS 2 TIMES DAILY WITH MEALS
Qty: 100 EACH | Refills: 0 | COMMUNITY
Start: 2019-12-09 | End: 2019-12-09 | Stop reason: SDUPTHER

## 2019-12-09 RX ORDER — BLOOD-GLUCOSE CONTROL, NORMAL
1 EACH MISCELLANEOUS 2 TIMES DAILY WITH MEALS
Qty: 100 EACH | Refills: 11 | Status: SHIPPED | OUTPATIENT
Start: 2019-12-09 | End: 2021-02-24 | Stop reason: SDUPTHER

## 2019-12-09 RX ORDER — CLOPIDOGREL BISULFATE 75 MG/1
75 TABLET ORAL DAILY
Qty: 30 TABLET | Refills: 0 | Status: ON HOLD | OUTPATIENT
Start: 2019-12-09 | End: 2019-12-18 | Stop reason: HOSPADM

## 2019-12-09 RX ORDER — POTASSIUM CHLORIDE 20 MEQ/1
20 TABLET, EXTENDED RELEASE ORAL ONCE
Status: COMPLETED | OUTPATIENT
Start: 2019-12-09 | End: 2019-12-09

## 2019-12-09 RX ORDER — INSULIN ASPART 100 [IU]/ML
9 INJECTION, SOLUTION INTRAVENOUS; SUBCUTANEOUS
Qty: 10 ML | Refills: 0 | Status: ON HOLD | OUTPATIENT
Start: 2019-12-09 | End: 2020-02-04

## 2019-12-09 RX ORDER — ESCITALOPRAM OXALATE 10 MG/1
10 TABLET ORAL DAILY
Qty: 30 TABLET | Refills: 0 | Status: ON HOLD | OUTPATIENT
Start: 2019-12-09 | End: 2020-07-17

## 2019-12-09 RX ORDER — ASPIRIN 325 MG
325 TABLET ORAL DAILY
Refills: 0 | Status: ON HOLD | COMMUNITY
Start: 2019-12-09 | End: 2019-12-18 | Stop reason: HOSPADM

## 2019-12-09 RX ORDER — CARVEDILOL 6.25 MG/1
6.25 TABLET ORAL 2 TIMES DAILY WITH MEALS
Qty: 60 TABLET | Refills: 0 | Status: ON HOLD | OUTPATIENT
Start: 2019-12-09 | End: 2019-12-18 | Stop reason: SDUPTHER

## 2019-12-09 RX ORDER — INSULIN GLARGINE 100 [IU]/ML
50 INJECTION, SOLUTION SUBCUTANEOUS NIGHTLY
Qty: 15 ML | Refills: 0 | Status: ON HOLD | OUTPATIENT
Start: 2019-12-09 | End: 2020-02-04 | Stop reason: SDUPTHER

## 2019-12-09 RX ORDER — FAMOTIDINE 20 MG/1
20 TABLET, FILM COATED ORAL 2 TIMES DAILY
Qty: 60 TABLET | Refills: 0 | Status: ON HOLD | OUTPATIENT
Start: 2019-12-09 | End: 2020-02-04 | Stop reason: HOSPADM

## 2019-12-09 RX ADMIN — FUROSEMIDE 80 MG: 40 TABLET ORAL at 09:12

## 2019-12-09 RX ADMIN — POTASSIUM CHLORIDE 20 MEQ: 1500 TABLET, EXTENDED RELEASE ORAL at 09:12

## 2019-12-09 RX ADMIN — ATORVASTATIN CALCIUM 40 MG: 20 TABLET, FILM COATED ORAL at 09:12

## 2019-12-09 RX ADMIN — CLOPIDOGREL BISULFATE 75 MG: 75 TABLET ORAL at 09:12

## 2019-12-09 RX ADMIN — LISINOPRIL 40 MG: 20 TABLET ORAL at 09:12

## 2019-12-09 RX ADMIN — ASPIRIN 325 MG ORAL TABLET 325 MG: 325 PILL ORAL at 09:12

## 2019-12-09 RX ADMIN — ESCITALOPRAM OXALATE 10 MG: 10 TABLET ORAL at 09:12

## 2019-12-09 RX ADMIN — CARVEDILOL 6.25 MG: 6.25 TABLET, FILM COATED ORAL at 09:12

## 2019-12-09 RX ADMIN — FAMOTIDINE 20 MG: 20 TABLET ORAL at 09:12

## 2019-12-09 RX ADMIN — INSULIN ASPART 2 UNITS: 100 INJECTION, SOLUTION INTRAVENOUS; SUBCUTANEOUS at 09:12

## 2019-12-09 NOTE — PLAN OF CARE
12/09/19 1301   Final Note   Assessment Type Final Discharge Note   Anticipated Discharge Disposition Home   What phone number can be called within the next 1-3 days to see how you are doing after discharge? [phone]       Patient discharging home today, with no needs.     Radha Francisco LMSW   PRN

## 2019-12-09 NOTE — PLAN OF CARE
12/09/19 1300   Post-Acute Status   Post-Acute Authorization Other   Other Status No Post-Acute Service Needs       Patient discharging today, home with no needs.     Radha Francisco, IKER   PRN

## 2019-12-09 NOTE — DISCHARGE INSTRUCTIONS
Take furosemide 80mg 1 pill twice daily for an additional day or until leg swelling completely resolves. Then switch to taking 1 pill once a day.    Remember to limit intake of salt to no more than 2 grams per day.    Try to drink less 1.5 liters if water per day.    Weigh yourself daily. If weight increases by 3-5 lbs take additional lasix dose that evening.

## 2019-12-09 NOTE — PLAN OF CARE
Pt  at bedside.  Pt resting in bed in NAD. RR e/u. VS being monitoring per MD orders. Pt completed bathroom transfer and tolerated well. Explanation of care provided. Bed in low, locked position with rails up and call bell in reach. Will continue to monitor.

## 2019-12-09 NOTE — PLAN OF CARE
Safety measures maintained. VSS on RA. Tele maintained. Pt denies any pain. Bed in lowest position, call light within reach, side rails up x2. Pt has no complaints at this time. Will continue to monitor.

## 2019-12-10 NOTE — DISCHARGE SUMMARY
"[facility] Hospital Medicine  Discharge Summary      Patient Name: Suyapa Connelly  MRN: [id number]  Admission Date: 12/6/2019  Hospital Length of Stay: 0 days  Discharge Date and Time:  12/10/2019 12:08 AM  Attending Physician: No att. providers found   Discharging Provider: Brittaney England MD  Primary Care Provider: Erlinda Rahman NP  Hospital Medicine Team: [facility] HOSP MED R Brittaney England MD    HPI:   Ms. Connelly is a 55 yo F hx of DM2 (A1c 10.2) on insulin, CAD s/p MI (2012), HTN, PAD s/p stent, heart failure (unknown EF- not available in records or care everywhere), anxiety/depression who presents with SOB since 1am last night.     Pt reports episodes of presyncope with exertion over the past "few months." Yesterday she experienced "fluttering" in her chest while at rest, but no pain. This morning she awoke at around 1am with severe SOB. Says she was unable to get comfortable in any position. Notes having had chest "pressure" across the superior part of chest and a brief episode of sternal squeezing like pain which self resolved, and a 3 day hx of progressive bilat lower extremity swelling and increased abdominal distention. Denies productive cough/congestion. Feels different than prior MI.  No nausea/vomiting/diarhea, denies fever/chills, sick contacts.      In ED she was found to have stable vital signs and O2 sats at goal. [id number], trop 0.023, Cr 1, Flu NEG. CXR revealed interstitial edema vs PNA,. CTA neg for PE. EKG with sinus tach and PVCs.     * No surgery found *      Hospital Course:   Admitted 12/6 for acute decompensated heart failure. Diuresed well on lasix 40mg IV BID. Peripheral edema improved. Had episode of acute onset dyspnea overnight with sats at goal on RA, no cute ischemic changes on EKG, but did have mild elevation in trop 0.035, which then normalized on repeat labs (0.026). Denied having had chest pain or presyncope. Sx improved after breathing tx. " "On day 2 worked with PT, but states had significant HARTMAN and the recommended out pt PT/OT. Also notes 2 episodes of watery stool and 1 episode of heaving, which resolved by day 3. TTE reveled EF 35%, GIIDD and PAP 49mmHg.  On her 3rd night had another episode of SOB with sats at goal on RA. EKG revealed sinus rhythm (83bpm) with PVCs. Complained of "fluttering" in her chest the next morning, but was in sinus rhythm on tele with PVCs. Diuretics switched to PO lasix 80mg po bid. COntinued to have good urine output. SIgnificant improvement in peripheral edema since admission. Sats remain at goal on RA and pt feeling well on morning of discharge date. COunseled on low Na diet, daily wts and fluid restriction 1.5L/day. Not interested in endocrine referral as she says she knows what she needs to do, she just doesn't do it. Refills sent to pharmacy and sent out out on lasix 80mg po daily with instructions to take additional dose if notices peripheral edema or weight gain 3-5lbs. Referral for cards and sleep study sent.     Consults:     No new Assessment & Plan notes have been filed under this hospital service since the last note was generated.  Service: Hospital Medicine    Final Active Diagnoses:    Diagnosis Date Noted POA    PRINCIPAL PROBLEM:  Acute on chronic combined systolic and diastolic heart failure [I50.43] 12/08/2019 Yes    Shortness of breath [R06.02] 12/06/2019 Yes    CAROLIN (generalized anxiety disorder) [F41.1] 05/07/2018 Yes     Chronic    Essential hypertension [I10] 05/05/2018 Yes    Type 2 diabetes mellitus without complication, with long-term current use of insulin [E11.9, Z79.4] 05/05/2018 Not Applicable    Peripheral artery disease [I73.9]  Yes      Problems Resolved During this Admission:       Discharged Condition: stable    Disposition: Home or Self Care    Follow Up:  Follow-up Information     Erlinda Rahman NP. Schedule an appointment as soon as possible for a visit in 1 week.  " "  Specialty:  Family Medicine  Contact information:  [address] [facility] [address]  [phone]                 Patient Instructions:      WHEELCHAIR FOR HOME USE     Order Specific Question Answer Comments   Hours in W/C per day: 8    Type of Wheelchair: Standard    Size(Width): 18"(STD adult)    Leg Support: STD footrests    Lap Belt: Buckle    Cushion: Basic    Height: 5' 4" (1.626 m)    Weight: 98.9 kg (218 lb)    Does patient have medical equipment at home? none    Length of need (1-99 months): 99    Please check all that apply: Caregiver is capable and willing to operate wheelchair safely.    Please check all that apply: Patient's upper body strength is sufficient for propulsion.    Please check all that apply: The patient requires the use of a w/c for activities of daily living within the Home.      Ambulatory Referral to Cardiology   Referral Priority: Routine Referral Type: Consultation   Referral Reason: Specialty Services Required   Requested Specialty: Cardiology   Number of Visits Requested: 1     Ambulatory Referral to Nutrition - [facility]   Referral Priority: Routine Referral Type: Consultation   Number of Visits Requested: 1     Ambulatory Referral to [facility] ([facility])   Referral Priority: Routine Referral Type: Consultation   Number of Visits Requested: 1     Ambulatory consult to Psychiatry   Referral Priority: Routine Referral Type: Psychiatric   Referral Reason: Specialty Services Required   Requested Specialty: Psychiatry   Number of Visits Requested: 1     Ambulatory Referral to Physical/Occupational Therapy   Referral Priority: Routine Referral Type: Physical Medicine   Referral Reason: Specialty Services Required   Requested Specialty: Physical Therapy   Number of Visits Requested: 1     Ambulatory Referral to Physical/Occupational Therapy   Referral Priority: Routine Referral Type: Physical Medicine   Referral Reason: Specialty Services Required   Requested " "Specialty: Occupational Therapy   Number of Visits Requested: 1     Autumn Patient Entered [facility] ([facility])     Polysomnogram (CPAP will be added if patient meets diagnostic criteria.)   Standing Status: Future Standing Exp. Date: 12/08/20     OHJACQUIE CARLOS ASSIGN QUESTIONNAIRE SERIES ([facility])       Significant Diagnostic Studies: Labs: All labs within the past 24 hours have been reviewed    Pending Diagnostic Studies:     None         Medications:  Reconciled Home Medications:      Medication List      START taking these medications    furosemide 80 MG tablet  Commonly known as:  LASIX  Take 1 tablet (80 mg total) by mouth once daily.     insulin aspart U-100 100 unit/mL injection  Commonly known as:  NOVOLOG  Inject 9 Units into the skin 3 (three) times daily with meals.        CHANGE how you take these medications    * pen needle, diabetic 32 gauge x 5/32" Ndle  1 each by Misc.(Non-Drug; Combo Route) route 2 (two) times daily with meals.  What changed:  Another medication with the same name was added. Make sure you understand how and when to take each.     * BD Ultra-Fine Short Pen Needle 31 gauge x 5/16" Ndle  Generic drug:  pen needle, diabetic  Use to inject insulin 2 (two) times daily with meals.  What changed:  You were already taking a medication with the same name, and this prescription was added. Make sure you understand how and when to take each.         * This list has 2 medication(s) that are the same as other medications prescribed for you. Read the directions carefully, and ask your doctor or other care provider to review them with you.            CONTINUE taking these medications    aspirin 325 MG tablet  Take 1 tablet (325 mg total) by mouth once daily.     atorvastatin 40 MG tablet  Commonly known as:  LIPITOR  Take 1 tablet (40 mg total) by mouth once daily.     carvedilol 6.25 MG tablet  Commonly known as:  COREG  Take 1 tablet (6.25 mg total) by mouth 2 (two) times daily with meals.   "   clopidogrel 75 mg tablet  Commonly known as:  PLAVIX  Take 1 tablet (75 mg total) by mouth once daily.     escitalopram oxalate 10 MG tablet  Commonly known as:  LEXAPRO  Take 1 tablet (10 mg total) by mouth once daily.     famotidine 20 MG tablet  Commonly known as:  PEPCID  Take 1 tablet (20 mg total) by mouth 2 (two) times daily.     gabapentin 300 MG capsule  Commonly known as:  NEURONTIN  Take 1 capsule (300 mg total) by mouth 3 (three) times daily.     hydrOXYzine pamoate 50 MG Cap  Commonly known as:  VISTARIL  Take 1 capsule (50 mg total) by mouth every 6 (six) hours as needed (anxiety).     Lantus Solostar U-100 Insulin glargine 100 units/mL (3mL) SubQ pen  Generic drug:  insulin  Inject 50 Units into the skin every evening.     lisinopril 40 MG tablet  Commonly known as:  PRINIVIL,ZESTRIL  Take 1 tablet (40 mg total) by mouth once daily.     ondansetron 8 MG Tbdl  Commonly known as:  ZOFRAN-ODT  Dissolve 1 tablet (8 mg total) by mouth every 8 (eight) hours as needed.     OneTouch Delica Plus Lancet 30 gauge Misc  Generic drug:  lancets  use to test blood glucose 2 (two) times daily with meals.        STOP taking these medications    promethazine 12.5 MG Tab  Commonly known as:  PHENERGAN        ASK your doctor about these medications    * OneTouch Verio Strp  Generic drug:  blood sugar diagnostic  test  blood glucose 2 (two) times daily with meals.  Ask about: Which instructions should I use?     * OneTouch Ultra Blue Test Strip Strp  Generic drug:  blood sugar diagnostic  Use to test blood glucose twice daily  Ask about: Which instructions should I use?     * OneTouch Verio System Misc  Generic drug:  blood-glucose meter  use as directed  Ask about: Which instructions should I use?     * OneTouch Ultra2 Meter Misc  Generic drug:  blood-glucose meter  Use to test blood glucose  Ask about: Which instructions should I use?         * This list has 4 medication(s) that are the same as other medications  prescribed for you. Read the directions carefully, and ask your doctor or other care provider to review them with you.                Indwelling Lines/Drains at time of discharge:   Lines/Drains/Airways     None                 Time spent on the discharge of patient: 35 minutes  Patient was seen and examined on the date of discharge and determined to be suitable for discharge.         Brittaney England MD  Department of Hospital Medicine  [facility]

## 2019-12-10 NOTE — HOSPITAL COURSE
"Admitted 12/6 for acute decompensated heart failure. Diuresed well on lasix 40mg IV BID. Peripheral edema improved. Had episode of acute onset dyspnea overnight with sats at goal on RA, no cute ischemic changes on EKG, but did have mild elevation in trop 0.035, which then normalized on repeat labs (0.026). Denied having had chest pain or presyncope. Sx improved after breathing tx. On day 2 worked with PT, but states had significant HARTMAN and the recommended out pt PT/OT. Also notes 2 episodes of watery stool and 1 episode of heaving, which resolved by day 3. TTE reveled EF 35%, GIIDD and PAP 49mmHg.  On her 3rd night had another episode of SOB with sats at goal on RA. EKG revealed sinus rhythm (83bpm) with PVCs. Complained of "fluttering" in her chest the next morning, but was in sinus rhythm on tele with PVCs. Diuretics switched to PO lasix 80mg po bid. COntinued to have good urine output. SIgnificant improvement in peripheral edema since admission. Sats remain at goal on RA and pt feeling well on morning of discharge date. COunseled on low Na diet, daily wts and fluid restriction 1.5L/day. Not interested in endocrine referral as she says she knows what she needs to do, she just doesn't do it. Refills sent to pharmacy and sent out out on lasix 80mg po daily with instructions to take additional dose if notices peripheral edema or weight gain 3-5lbs. Referral for cards and sleep study sent.  "

## 2019-12-12 LAB
BACTERIA BLD CULT: NORMAL
BACTERIA BLD CULT: NORMAL

## 2019-12-13 ENCOUNTER — OFFICE VISIT (OUTPATIENT)
Dept: CARDIOLOGY | Facility: CLINIC | Age: 54
End: 2019-12-13
Payer: MEDICAID

## 2019-12-13 VITALS
HEIGHT: 64 IN | HEART RATE: 72 BPM | WEIGHT: 216 LBS | DIASTOLIC BLOOD PRESSURE: 72 MMHG | SYSTOLIC BLOOD PRESSURE: 118 MMHG | OXYGEN SATURATION: 99 % | BODY MASS INDEX: 36.88 KG/M2

## 2019-12-13 DIAGNOSIS — Z79.4 TYPE 2 DIABETES MELLITUS WITHOUT COMPLICATION, WITH LONG-TERM CURRENT USE OF INSULIN: ICD-10-CM

## 2019-12-13 DIAGNOSIS — E11.9 TYPE 2 DIABETES MELLITUS WITHOUT COMPLICATION, WITH LONG-TERM CURRENT USE OF INSULIN: ICD-10-CM

## 2019-12-13 DIAGNOSIS — I10 ESSENTIAL HYPERTENSION: ICD-10-CM

## 2019-12-13 DIAGNOSIS — I73.9 PAD (PERIPHERAL ARTERY DISEASE): ICD-10-CM

## 2019-12-13 DIAGNOSIS — E78.2 MIXED HYPERLIPIDEMIA: ICD-10-CM

## 2019-12-13 DIAGNOSIS — I42.0 DILATED CARDIOMYOPATHY: ICD-10-CM

## 2019-12-13 DIAGNOSIS — I50.42 CHRONIC COMBINED SYSTOLIC AND DIASTOLIC HEART FAILURE: ICD-10-CM

## 2019-12-13 PROCEDURE — 99999 PR PBB SHADOW E&M-EST. PATIENT-LVL III: CPT | Mod: PBBFAC,,, | Performed by: INTERNAL MEDICINE

## 2019-12-13 PROCEDURE — 99213 OFFICE O/P EST LOW 20 MIN: CPT | Mod: PBBFAC,PN | Performed by: INTERNAL MEDICINE

## 2019-12-13 PROCEDURE — 99999 PR PBB SHADOW E&M-EST. PATIENT-LVL III: ICD-10-PCS | Mod: PBBFAC,,, | Performed by: INTERNAL MEDICINE

## 2019-12-13 PROCEDURE — 99204 PR OFFICE/OUTPT VISIT, NEW, LEVL IV, 45-59 MIN: ICD-10-PCS | Mod: S$PBB,,, | Performed by: INTERNAL MEDICINE

## 2019-12-13 PROCEDURE — 99204 OFFICE O/P NEW MOD 45 MIN: CPT | Mod: S$PBB,,, | Performed by: INTERNAL MEDICINE

## 2019-12-13 RX ORDER — DIPHENHYDRAMINE HCL 25 MG
50 CAPSULE ORAL ONCE
Status: CANCELLED | OUTPATIENT
Start: 2019-12-13 | End: 2019-12-13

## 2019-12-13 RX ORDER — TORSEMIDE 20 MG/1
40 TABLET ORAL DAILY
Qty: 180 TABLET | Refills: 3 | Status: ON HOLD | OUTPATIENT
Start: 2019-12-13 | End: 2020-02-04 | Stop reason: HOSPADM

## 2019-12-13 RX ORDER — SODIUM CHLORIDE 9 MG/ML
3 INJECTION, SOLUTION INTRAVENOUS CONTINUOUS
Status: CANCELLED | OUTPATIENT
Start: 2019-12-13 | End: 2019-12-13

## 2019-12-13 NOTE — ASSESSMENT & PLAN NOTE
On statin.  LDL goal < 70.  Compliant w/ meds.    - continue statin therapy  - check lipid panel  - encouraged risk factor and lifestyle modifications

## 2019-12-13 NOTE — ASSESSMENT & PLAN NOTE
Uncontrolled.  Goal HgbA1c < 7%.  Compliant w/ meds.  - continue medical management per PCP    - encouraged risk factor and lifestyle modifications

## 2019-12-13 NOTE — ASSESSMENT & PLAN NOTE
Decompensated.  Class II, Stage C.  Compliant w/ meds however pt not diuresing.    - check furosemide to torsemide and concern for gut edema and poor absorption  - plan to LHC/RHC for eval of cardiomyopathy  - consider MRA or increasing BB when pt compensated  1. Left and right cardiac catheterization with probable PCI.   2. Antiplatelets: ASA, clopidogrel load if PCI indicated  3. Access: R radial, 5/6 Fr sheath, R brachial vein 6/7 Fr sheath  4. Catheters: 5 Fr Nikolai, 6 Fr wedge catheter  5. Pt is a AILEEN candidate and understands the importance of taking plavix for at least one year in ACS cases and 6 months in stable CAD. The patient understands that in case of receiving a drug coated stent the failure to comply with dual anti-platelet therapy as prescribed is likely to result in stent clothing, heart attack and death.   6. The risks, benefits, and alternatives of coronary vascular angiography and possible intervention were discussed with the patient. All questions were answered and informed consent was obtained. I had a detailed discussion with the patient regarding risk of stroke, MI, bleeding access site complications including limb loss, allergy, kidney failure including dialysis and death.  7. The patient understands the risks and benefits and wishes to go ahead with the procedure.  8. CSHA Clinical Frailty Scale: Managing well  9. All patient's questions were answered

## 2019-12-13 NOTE — LETTER
December 13, 2019      Brittaney England MD  [address] [place] [address]           [facility]  [address], [facility] [id number]  [facility] [address]  Phone: [phone]  Fax: [phone]          Patient: Suyapa Connelly   MR Number: [id number]   YOB: 1965   Date of Visit: 12/13/2019       Dear Dr. Brittaney England:    Thank you for referring Suyapa Connelly to me for evaluation. Attached you will find relevant portions of my assessment and plan of care.    If you have questions, please do not hesitate to call me. I look forward to following Suyapa Connelly along with you.    Sincerely,    Que Fernando III, MD    Enclosure  CC:  No Recipients    If you would like to receive this communication electronically, please contact [email or web address] or [phone] to request more information on [facility] Link access.    For providers and/or their staff who would like to refer a patient to [facility], please contact us through our one-stop-shop provider referral line, [facility], at [phone].    If you feel you have received this communication in error or would no longer like to receive these types of communications, please e-mail [email or web address]

## 2019-12-13 NOTE — ASSESSMENT & PLAN NOTE
Controlled.  Goal BP < 130/80.  Compliant w/ medical therapy.  - continue medical therapy   - pt to start to monitor BP at home and record  - encouraged risk factor and lifestyle modifications

## 2019-12-13 NOTE — ASSESSMENT & PLAN NOTE
Pt noted to have stents below the knee in 2015 and states that they were performed at [facility] however there is no report in system regarding this.  Pt notes possibly an iliac stent as well.  Leydi 2b claudication, no wounds present.      - continue medical therapy  - encouraged increased ambulation  - risk factor and lifestyle modifications

## 2019-12-13 NOTE — PROGRESS NOTES
Subjective:    Patient ID:  Suyapa Connelly is a 54 y.o. female who presents for evaluation of Hospital Follow Up (d/c 12/9 from hosp. dx:pulm.htn); Hypertension; and Congestive Heart Failure      PCP: Erlinda Rahman NP     HPI  Pt is a 55 yo F w/ PMH of HTN, HLD, DM2 w/ HgbA1C 10.2%, CAD s/p MI 2012 but no intervention, Cardiomyopathy w/ LVEF 35% and GIIDD, PAD s/p PTA w/ stents in her BLE 2015 who presents for evaluation following hospitalization as pt was d/c'd on 12/9/19 for new onset decompensated heart failure.  She was diuresed w/ IV furosemide and was treated w/ meds and set to f/u w/ o/p cardiology.  She mentions that her BLE edema has worsened since her d/c and she states that she was d/c'd w/ PO furosemide however she does not feel as though she is urinating much.  She admits to delgado and claudication (1/2 block ambulation).  She denies cp, orthopnea, PND, palpitations, presyncope, and LOC.  She does not monitor her BP at home.  She does not exercise however has a referral for cardiac rehab.  Of note, she mentions that she was uninsured for quite some time and was not evaluated by a Dr in about 3-5 years.        Past Medical History:   Diagnosis Date    Anxiety     Depression     Diabetes mellitus     type 2    GERD (gastroesophageal reflux disease)     Hyperlipemia     Hypertension     Myocardial infarction 2010    minor-caused by stress per pt.     Past Surgical History:   Procedure Laterality Date    Angiogram - Right Extremity Right 7/9/15    angiogram-left leg  10/6/15     Social History     Socioeconomic History    Marital status: Legally      Spouse name: Not on file    Number of children: Not on file    Years of education: Not on file    Highest education level: Not on file   Occupational History    Not on file   Social Needs    Financial resource strain: Not on file    Food insecurity:     Worry: Not on file     Inability: Not on file    Transportation needs:      Medical: Not on file     Non-medical: Not on file   Tobacco Use    Smoking status: Never Smoker    Smokeless tobacco: Never Used   Substance and Sexual Activity    Alcohol use: No    Drug use: Yes     Types: Marijuana     Comment: used yesterday    Sexual activity: Not on file   Lifestyle    Physical activity:     Days per week: Not on file     Minutes per session: Not on file    Stress: Not on file   Relationships    Social connections:     Talks on phone: Not on file     Gets together: Not on file     Attends Sikhism service: Not on file     Active member of club or organization: Not on file     Attends meetings of clubs or organizations: Not on file     Relationship status: Not on file   Other Topics Concern    Not on file   Social History Narrative    Not on file     Family History   Problem Relation Age of Onset    Anesthesia problems Neg Hx        Review of patient's allergies indicates:   Allergen Reactions    Pcn [penicillins]      rash       Medication List with Changes/Refills   Current Medications    ASPIRIN 325 MG TABLET    Take 1 tablet (325 mg total) by mouth once daily.    ATORVASTATIN (LIPITOR) 40 MG TABLET    Take 1 tablet (40 mg total) by mouth once daily.    BLOOD SUGAR DIAGNOSTIC (ONETOUCH ULTRA BLUE TEST STRIP) STRP    Use to test blood glucose twice daily    BLOOD SUGAR DIAGNOSTIC STRP    test  blood glucose 2 (two) times daily with meals.    BLOOD-GLUCOSE METER MISC    use as directed    BLOOD-GLUCOSE METER MISC    Use to test blood glucose    CARVEDILOL (COREG) 6.25 MG TABLET    Take 1 tablet (6.25 mg total) by mouth 2 (two) times daily with meals.    CLOPIDOGREL (PLAVIX) 75 MG TABLET    Take 1 tablet (75 mg total) by mouth once daily.    ESCITALOPRAM OXALATE (LEXAPRO) 10 MG TABLET    Take 1 tablet (10 mg total) by mouth once daily.    FAMOTIDINE (PEPCID) 20 MG TABLET    Take 1 tablet (20 mg total) by mouth 2 (two) times daily.    GABAPENTIN (NEURONTIN) 300 MG CAPSULE    Take 1  "capsule (300 mg total) by mouth 3 (three) times daily.    HYDROXYZINE PAMOATE (VISTARIL) 50 MG CAP    Take 1 capsule (50 mg total) by mouth every 6 (six) hours as needed (anxiety).    INSULIN ASPART U-100 (NOVOLOG) 100 UNIT/ML INJECTION    Inject 9 Units into the skin 3 (three) times daily with meals.    INSULIN GLARGINE 100 UNITS/ML (3ML) SUBQ PEN    Inject 50 Units into the skin every evening.    LANCETS 30 GAUGE MISC    use to test blood glucose 2 (two) times daily with meals.    LISINOPRIL (PRINIVIL,ZESTRIL) 40 MG TABLET    Take 1 tablet (40 mg total) by mouth once daily.    ONDANSETRON (ZOFRAN-ODT) 8 MG TBDL    Dissolve 1 tablet (8 mg total) by mouth every 8 (eight) hours as needed.    PEN NEEDLE, DIABETIC 31 GAUGE X 5/16" NDLE    Use to inject insulin 2 (two) times daily with meals.    PEN NEEDLE, DIABETIC 32 GAUGE X 5/32" NDLE    1 each by Misc.(Non-Drug; Combo Route) route 2 (two) times daily with meals.   Discontinued Medications    FUROSEMIDE (LASIX) 80 MG TABLET    Take 1 tablet (80 mg total) by mouth once daily.       Review of Systems   Constitution: Negative for diaphoresis and fever.   HENT: Negative for congestion and hearing loss.    Eyes: Negative for blurred vision and pain.   Cardiovascular: Positive for dyspnea on exertion and leg swelling. Negative for chest pain, claudication, near-syncope, orthopnea, palpitations, paroxysmal nocturnal dyspnea and syncope.   Respiratory: Negative for shortness of breath and sleep disturbances due to breathing.    Hematologic/Lymphatic: Negative for bleeding problem. Does not bruise/bleed easily.   Skin: Negative for color change and poor wound healing.   Gastrointestinal: Negative for abdominal pain and nausea.   Genitourinary: Negative for bladder incontinence and flank pain.   Neurological: Negative for focal weakness and light-headedness.        Objective:   /72 (BP Location: Left arm, Patient Position: Sitting, BP Method: Large (Manual))   Pulse 72  " " Ht 5' 4" (1.626 m)   Wt 98 kg (216 lb)   LMP 09/30/2015 (Exact Date)   SpO2 99%   BMI 37.08 kg/m²    Physical Exam   Constitutional: She is oriented to person, place, and time. She appears well-developed and well-nourished.   HENT:   Head: Normocephalic and atraumatic.   Mouth/Throat: Oropharynx is clear and moist.   Eyes: Pupils are equal, round, and reactive to light. EOM are normal. No scleral icterus.   Neck: Normal range of motion. Neck supple. JVD present.   Cardiovascular: Normal rate, regular rhythm, S1 normal, S2 normal and intact distal pulses. Exam reveals no gallop and no friction rub.   No murmur heard.  Pulmonary/Chest: Effort normal and breath sounds normal. No respiratory distress. She has no wheezes. She has no rales. She exhibits no tenderness.   Abdominal: Soft. Bowel sounds are normal. She exhibits no distension and no mass. There is no tenderness. There is no rebound.   Musculoskeletal: Normal range of motion. She exhibits edema. She exhibits no tenderness.   1+ BLE edema   Neurological: She is alert and oriented to person, place, and time. She displays normal reflexes. Coordination normal.   Skin: Skin is warm and dry. She is not diaphoretic. No pallor.   Psychiatric: She has a normal mood and affect. Her behavior is normal. Judgment normal.         Assessment:       1. Chronic combined systolic and diastolic heart failure    2. Essential hypertension    3. PAD (peripheral artery disease)    4. Mixed hyperlipidemia    5. Dilated cardiomyopathy    6. Type 2 diabetes mellitus without complication, with long-term current use of insulin         Plan:         Chronic combined systolic and diastolic heart failure  Decompensated.  Class II, Stage C.  Compliant w/ meds however pt not diuresing.    - check furosemide to torsemide and concern for gut edema and poor absorption  - plan to LHC/RHC for eval of cardiomyopathy  - consider MRA or increasing BB when pt compensated  1. Left and right " cardiac catheterization with probable PCI.   2. Antiplatelets: ASA, clopidogrel load if PCI indicated  3. Access: R radial, 5/6 Fr sheath, R brachial vein 6/7 Fr sheath  4. Catheters: 5 Fr Nikolai, 6 Fr wedge catheter  5. Pt is a AILEEN candidate and understands the importance of taking plavix for at least one year in ACS cases and 6 months in stable CAD. The patient understands that in case of receiving a drug coated stent the failure to comply with dual anti-platelet therapy as prescribed is likely to result in stent clothing, heart attack and death.   6. The risks, benefits, and alternatives of coronary vascular angiography and possible intervention were discussed with the patient. All questions were answered and informed consent was obtained. I had a detailed discussion with the patient regarding risk of stroke, MI, bleeding access site complications including limb loss, allergy, kidney failure including dialysis and death.  7. The patient understands the risks and benefits and wishes to go ahead with the procedure.  8. CSHA Clinical Frailty Scale: Managing well  9. All patient's questions were answered      Essential hypertension  Controlled.  Goal BP < 130/80.  Compliant w/ medical therapy.  - continue medical therapy   - pt to start to monitor BP at home and record  - encouraged risk factor and lifestyle modifications    PAD (peripheral artery disease)  Pt noted to have stents below the knee in 2015 and [place] [facility] [place] in system regarding this.  Pt notes possibly an iliac stent as well.  Leydi 2b claudication, no wounds present.      - continue medical therapy  - encouraged increased ambulation  - risk factor and lifestyle modifications    Mixed hyperlipidemia  On statin.  LDL goal < 70.  Compliant w/ meds.    - continue statin therapy  - encouraged risk factor and lifestyle modifications    Dilated cardiomyopathy  Decompensated.  Etiology unknown at this time.     - plan as above    Type 2 diabetes mellitus without complication, with long-term current use of insulin  Uncontrolled.  Goal HgbA1c < 7%.  Compliant w/ meds.  - continue medical management per PCP    - encouraged risk factor and lifestyle modifications      Total duration of face to face visit time 45 minutes.  Total time spent counseling greater than fifty percent of total visit time.  Counseling included discussion regarding imaging findings, diagnosis, possibilities, treatment options, risks and benefits.  The patient had many questions regarding the options and long-term effects      Que Fernando M.D.  Interventional Cardiology

## 2019-12-16 ENCOUNTER — LAB VISIT (OUTPATIENT)
Dept: LAB | Facility: HOSPITAL | Age: 54
End: 2019-12-16
Attending: INTERNAL MEDICINE
Payer: MEDICAID

## 2019-12-16 DIAGNOSIS — I10 ESSENTIAL HYPERTENSION: ICD-10-CM

## 2019-12-16 DIAGNOSIS — I73.9 PAD (PERIPHERAL ARTERY DISEASE): ICD-10-CM

## 2019-12-16 DIAGNOSIS — I50.42 CHRONIC COMBINED SYSTOLIC AND DIASTOLIC HEART FAILURE: ICD-10-CM

## 2019-12-16 DIAGNOSIS — E11.9 TYPE 2 DIABETES MELLITUS WITHOUT COMPLICATION, WITH LONG-TERM CURRENT USE OF INSULIN: ICD-10-CM

## 2019-12-16 DIAGNOSIS — Z79.4 TYPE 2 DIABETES MELLITUS WITHOUT COMPLICATION, WITH LONG-TERM CURRENT USE OF INSULIN: ICD-10-CM

## 2019-12-16 DIAGNOSIS — E78.2 MIXED HYPERLIPIDEMIA: ICD-10-CM

## 2019-12-16 DIAGNOSIS — I42.0 DILATED CARDIOMYOPATHY: ICD-10-CM

## 2019-12-16 LAB
ANION GAP SERPL CALC-SCNC: 11 MMOL/L (ref 8–16)
BASOPHILS # BLD AUTO: 0.04 K/UL (ref 0–0.2)
BASOPHILS NFR BLD: 0.6 % (ref 0–1.9)
BUN SERPL-MCNC: 33 MG/DL (ref 6–20)
CALCIUM SERPL-MCNC: 9 MG/DL (ref 8.7–10.5)
CHLORIDE SERPL-SCNC: 100 MMOL/L (ref 95–110)
CHOLEST SERPL-MCNC: 244 MG/DL (ref 120–199)
CHOLEST/HDLC SERPL: 5.3 {RATIO} (ref 2–5)
CO2 SERPL-SCNC: 28 MMOL/L (ref 23–29)
CREAT SERPL-MCNC: 1.6 MG/DL (ref 0.5–1.4)
DIFFERENTIAL METHOD: ABNORMAL
EOSINOPHIL # BLD AUTO: 0.3 K/UL (ref 0–0.5)
EOSINOPHIL NFR BLD: 3.8 % (ref 0–8)
ERYTHROCYTE [DISTWIDTH] IN BLOOD BY AUTOMATED COUNT: 13.6 % (ref 11.5–14.5)
EST. GFR  (AFRICAN AMERICAN): 42 ML/MIN/1.73 M^2
EST. GFR  (NON AFRICAN AMERICAN): 37 ML/MIN/1.73 M^2
GLUCOSE SERPL-MCNC: 68 MG/DL (ref 70–110)
HCT VFR BLD AUTO: 38 % (ref 37–48.5)
HDLC SERPL-MCNC: 46 MG/DL (ref 40–75)
HDLC SERPL: 18.9 % (ref 20–50)
HGB BLD-MCNC: 11.7 G/DL (ref 12–16)
LDLC SERPL CALC-MCNC: 171 MG/DL (ref 63–159)
LYMPHOCYTES # BLD AUTO: 2.9 K/UL (ref 1–4.8)
LYMPHOCYTES NFR BLD: 43.8 % (ref 18–48)
MCH RBC QN AUTO: 30 PG (ref 27–31)
MCHC RBC AUTO-ENTMCNC: 30.8 G/DL (ref 32–36)
MCV RBC AUTO: 97 FL (ref 82–98)
MONOCYTES # BLD AUTO: 0.6 K/UL (ref 0.3–1)
MONOCYTES NFR BLD: 9.8 % (ref 4–15)
NEUTROPHILS # BLD AUTO: 2.7 K/UL (ref 1.8–7.7)
NEUTROPHILS NFR BLD: 42 % (ref 38–73)
NONHDLC SERPL-MCNC: 198 MG/DL
PLATELET # BLD AUTO: 330 K/UL (ref 150–350)
PMV BLD AUTO: 10.3 FL (ref 9.2–12.9)
POTASSIUM SERPL-SCNC: 4.7 MMOL/L (ref 3.5–5.1)
RBC # BLD AUTO: 3.9 M/UL (ref 4–5.4)
SODIUM SERPL-SCNC: 139 MMOL/L (ref 136–145)
TRIGL SERPL-MCNC: 135 MG/DL (ref 30–150)
WBC # BLD AUTO: 6.55 K/UL (ref 3.9–12.7)

## 2019-12-16 PROCEDURE — 80048 BASIC METABOLIC PNL TOTAL CA: CPT

## 2019-12-16 PROCEDURE — 80061 LIPID PANEL: CPT

## 2019-12-16 PROCEDURE — 36415 COLL VENOUS BLD VENIPUNCTURE: CPT

## 2019-12-16 PROCEDURE — 85025 COMPLETE CBC W/AUTO DIFF WBC: CPT

## 2019-12-18 DIAGNOSIS — I25.10 CORONARY ARTERY DISEASE, ANGINA PRESENCE UNSPECIFIED, UNSPECIFIED VESSEL OR LESION TYPE, UNSPECIFIED WHETHER NATIVE OR TRANSPLANTED HEART: Primary | ICD-10-CM

## 2019-12-19 ENCOUNTER — TELEPHONE (OUTPATIENT)
Dept: PREADMISSION TESTING | Facility: HOSPITAL | Age: 54
End: 2019-12-19

## 2019-12-19 DIAGNOSIS — Z01.818 PREOP TESTING: ICD-10-CM

## 2019-12-19 DIAGNOSIS — Z01.818 PRE-OP TESTING: Primary | ICD-10-CM

## 2019-12-19 NOTE — TELEPHONE ENCOUNTER
Hello I will need to know what you need. We offer Internal Medicine also Anesthesia. If you need more info pleas contact Ramila Barnett.

## 2019-12-23 ENCOUNTER — TELEPHONE (OUTPATIENT)
Dept: PREADMISSION TESTING | Facility: HOSPITAL | Age: 54
End: 2019-12-23

## 2019-12-23 NOTE — TELEPHONE ENCOUNTER
----- Message from Pema Almanzar MA sent at 12/23/2019  2:51 PM CST -----  Please schedule patient for Pre-op Anesthesia visit on 01/02/2020.Thanks.

## 2019-12-26 NOTE — TELEPHONE ENCOUNTER
10 am on 1/2 Appt. Enter per your request. Did not contact patient. THIS PATIENT MUST HAVE A SURGERY TAB OR THE APPT COULD BE CANCEL BY CLINIC.

## 2020-01-02 ENCOUNTER — HOSPITAL ENCOUNTER (INPATIENT)
Facility: HOSPITAL | Age: 55
LOS: 33 days | Discharge: HOME | DRG: 235 | End: 2020-02-04
Attending: EMERGENCY MEDICINE | Admitting: EMERGENCY MEDICINE
Payer: MEDICAID

## 2020-01-02 DIAGNOSIS — I24.9 ACUTE CORONARY SYNDROME: ICD-10-CM

## 2020-01-02 DIAGNOSIS — E87.70 FLUID OVERLOAD: ICD-10-CM

## 2020-01-02 DIAGNOSIS — I49.9 IRREGULAR HEART BEATS: ICD-10-CM

## 2020-01-02 DIAGNOSIS — S37.001A INJURY OF RIGHT KIDNEY, INITIAL ENCOUNTER: ICD-10-CM

## 2020-01-02 DIAGNOSIS — I21.4 NSTEMI (NON-ST ELEVATED MYOCARDIAL INFARCTION): ICD-10-CM

## 2020-01-02 DIAGNOSIS — R00.0 TACHYCARDIA: ICD-10-CM

## 2020-01-02 DIAGNOSIS — I50.43 ACUTE ON CHRONIC COMBINED SYSTOLIC (CONGESTIVE) AND DIASTOLIC (CONGESTIVE) HEART FAILURE: ICD-10-CM

## 2020-01-02 DIAGNOSIS — E11.9 TYPE 2 DIABETES MELLITUS WITHOUT COMPLICATION, WITH LONG-TERM CURRENT USE OF INSULIN: Primary | ICD-10-CM

## 2020-01-02 DIAGNOSIS — I25.10 CORONARY ARTERY DISEASE, ANGINA PRESENCE UNSPECIFIED, UNSPECIFIED VESSEL OR LESION TYPE, UNSPECIFIED WHETHER NATIVE OR TRANSPLANTED HEART: ICD-10-CM

## 2020-01-02 DIAGNOSIS — E87.70 HYPERVOLEMIA, UNSPECIFIED HYPERVOLEMIA TYPE: ICD-10-CM

## 2020-01-02 DIAGNOSIS — I25.10 CAD (CORONARY ARTERY DISEASE): ICD-10-CM

## 2020-01-02 DIAGNOSIS — I49.9 ABNORMAL HEART RHYTHM: ICD-10-CM

## 2020-01-02 DIAGNOSIS — N17.9 AKI (ACUTE KIDNEY INJURY): ICD-10-CM

## 2020-01-02 DIAGNOSIS — E87.6 HYPOKALEMIA: ICD-10-CM

## 2020-01-02 DIAGNOSIS — Z79.4 TYPE 2 DIABETES MELLITUS WITH HYPERGLYCEMIA, WITH LONG-TERM CURRENT USE OF INSULIN: ICD-10-CM

## 2020-01-02 DIAGNOSIS — T50.8X5A CONTRAST DYE INDUCED NEPHROPATHY: ICD-10-CM

## 2020-01-02 DIAGNOSIS — I48.91 A-FIB: ICD-10-CM

## 2020-01-02 DIAGNOSIS — Z95.1 S/P CABG X 1: Primary | ICD-10-CM

## 2020-01-02 DIAGNOSIS — E11.65 TYPE 2 DIABETES MELLITUS WITH HYPERGLYCEMIA, WITH LONG-TERM CURRENT USE OF INSULIN: ICD-10-CM

## 2020-01-02 DIAGNOSIS — Z79.4 TYPE 2 DIABETES MELLITUS WITHOUT COMPLICATION, WITH LONG-TERM CURRENT USE OF INSULIN: Primary | ICD-10-CM

## 2020-01-02 DIAGNOSIS — R07.9 CHEST PAIN: ICD-10-CM

## 2020-01-02 DIAGNOSIS — E78.2 MIXED HYPERLIPIDEMIA: ICD-10-CM

## 2020-01-02 DIAGNOSIS — I10 ESSENTIAL HYPERTENSION: ICD-10-CM

## 2020-01-02 DIAGNOSIS — I24.9 ACS (ACUTE CORONARY SYNDROME): ICD-10-CM

## 2020-01-02 DIAGNOSIS — N14.11 CONTRAST DYE INDUCED NEPHROPATHY: ICD-10-CM

## 2020-01-02 PROBLEM — I50.9 ACUTE HF (HEART FAILURE): Status: ACTIVE | Noted: 2020-01-02

## 2020-01-02 LAB
ALBUMIN SERPL BCP-MCNC: 2.5 G/DL (ref 3.5–5.2)
ALP SERPL-CCNC: 98 U/L (ref 55–135)
ALT SERPL W/O P-5'-P-CCNC: 9 U/L (ref 10–44)
AMPHET+METHAMPHET UR QL: NEGATIVE
ANION GAP SERPL CALC-SCNC: 11 MMOL/L (ref 8–16)
ANION GAP SERPL CALC-SCNC: 14 MMOL/L (ref 8–16)
APTT BLDCRRT: 24.1 SEC (ref 21–32)
APTT BLDCRRT: 32.4 SEC (ref 21–32)
APTT BLDCRRT: 40.4 SEC (ref 21–32)
APTT BLDCRRT: 40.4 SEC (ref 21–32)
AST SERPL-CCNC: 10 U/L (ref 10–40)
BACTERIA #/AREA URNS AUTO: ABNORMAL /HPF
BARBITURATES UR QL SCN>200 NG/ML: NEGATIVE
BASOPHILS # BLD AUTO: 0.02 K/UL (ref 0–0.2)
BASOPHILS # BLD AUTO: 0.02 K/UL (ref 0–0.2)
BASOPHILS # BLD AUTO: 0.03 K/UL (ref 0–0.2)
BASOPHILS NFR BLD: 0.1 % (ref 0–1.9)
BASOPHILS NFR BLD: 0.2 % (ref 0–1.9)
BASOPHILS NFR BLD: 0.2 % (ref 0–1.9)
BENZODIAZ UR QL SCN>200 NG/ML: NEGATIVE
BILIRUB SERPL-MCNC: 0.8 MG/DL (ref 0.1–1)
BILIRUB UR QL STRIP: NEGATIVE
BNP SERPL-MCNC: 1013 PG/ML (ref 0–99)
BUN SERPL-MCNC: 26 MG/DL (ref 6–20)
BUN SERPL-MCNC: 27 MG/DL (ref 6–20)
BUN SERPL-MCNC: 36 MG/DL (ref 6–30)
BZE UR QL SCN: NEGATIVE
CALCIUM SERPL-MCNC: 9 MG/DL (ref 8.7–10.5)
CALCIUM SERPL-MCNC: 9.1 MG/DL (ref 8.7–10.5)
CANNABINOIDS UR QL SCN: NORMAL
CHLORIDE SERPL-SCNC: 97 MMOL/L (ref 95–110)
CHLORIDE SERPL-SCNC: 98 MMOL/L (ref 95–110)
CHLORIDE SERPL-SCNC: 99 MMOL/L (ref 95–110)
CK SERPL-CCNC: 149 U/L (ref 20–180)
CLARITY UR REFRACT.AUTO: CLEAR
CO2 SERPL-SCNC: 20 MMOL/L (ref 23–29)
CO2 SERPL-SCNC: 25 MMOL/L (ref 23–29)
COLOR UR AUTO: ABNORMAL
CREAT SERPL-MCNC: 1 MG/DL (ref 0.5–1.4)
CREAT SERPL-MCNC: 1.2 MG/DL (ref 0.5–1.4)
CREAT SERPL-MCNC: 1.2 MG/DL (ref 0.5–1.4)
CREAT UR-MCNC: 52 MG/DL (ref 15–325)
DIFFERENTIAL METHOD: ABNORMAL
EOSINOPHIL # BLD AUTO: 0 K/UL (ref 0–0.5)
EOSINOPHIL NFR BLD: 0 % (ref 0–8)
ERYTHROCYTE [DISTWIDTH] IN BLOOD BY AUTOMATED COUNT: 12.5 % (ref 11.5–14.5)
ERYTHROCYTE [DISTWIDTH] IN BLOOD BY AUTOMATED COUNT: 12.5 % (ref 11.5–14.5)
ERYTHROCYTE [DISTWIDTH] IN BLOOD BY AUTOMATED COUNT: 12.6 % (ref 11.5–14.5)
EST. GFR  (AFRICAN AMERICAN): 59.6 ML/MIN/1.73 M^2
EST. GFR  (AFRICAN AMERICAN): 59.6 ML/MIN/1.73 M^2
EST. GFR  (NON AFRICAN AMERICAN): 51.7 ML/MIN/1.73 M^2
EST. GFR  (NON AFRICAN AMERICAN): 51.7 ML/MIN/1.73 M^2
ETHANOL UR-MCNC: <10 MG/DL
GLUCOSE SERPL-MCNC: 321 MG/DL (ref 70–110)
GLUCOSE SERPL-MCNC: 357 MG/DL (ref 70–110)
GLUCOSE SERPL-MCNC: 376 MG/DL (ref 70–110)
GLUCOSE UR QL STRIP: ABNORMAL
HCT VFR BLD AUTO: 38.6 % (ref 37–48.5)
HCT VFR BLD AUTO: 39.7 % (ref 37–48.5)
HCT VFR BLD AUTO: 41.8 % (ref 37–48.5)
HCT VFR BLD CALC: 44 %PCV (ref 36–54)
HGB BLD-MCNC: 12.5 G/DL (ref 12–16)
HGB BLD-MCNC: 12.5 G/DL (ref 12–16)
HGB BLD-MCNC: 13.4 G/DL (ref 12–16)
HGB UR QL STRIP: ABNORMAL
HYALINE CASTS UR QL AUTO: 0 /LPF
IMM GRANULOCYTES # BLD AUTO: 0.05 K/UL (ref 0–0.04)
IMM GRANULOCYTES # BLD AUTO: 0.07 K/UL (ref 0–0.04)
IMM GRANULOCYTES # BLD AUTO: 0.11 K/UL (ref 0–0.04)
IMM GRANULOCYTES NFR BLD AUTO: 0.4 % (ref 0–0.5)
IMM GRANULOCYTES NFR BLD AUTO: 0.5 % (ref 0–0.5)
IMM GRANULOCYTES NFR BLD AUTO: 0.9 % (ref 0–0.5)
INFLUENZA A, MOLECULAR: NEGATIVE
INFLUENZA B, MOLECULAR: NEGATIVE
INR PPP: 1.1 (ref 0.8–1.2)
INR PPP: 1.1 (ref 0.8–1.2)
KETONES UR QL STRIP: NEGATIVE
LACTATE SERPL-SCNC: 1.3 MMOL/L (ref 0.5–2.2)
LEUKOCYTE ESTERASE UR QL STRIP: NEGATIVE
LIPASE SERPL-CCNC: 8 U/L (ref 4–60)
LYMPHOCYTES # BLD AUTO: 0.9 K/UL (ref 1–4.8)
LYMPHOCYTES # BLD AUTO: 1.1 K/UL (ref 1–4.8)
LYMPHOCYTES # BLD AUTO: 1.2 K/UL (ref 1–4.8)
LYMPHOCYTES NFR BLD: 7.4 % (ref 18–48)
LYMPHOCYTES NFR BLD: 8.1 % (ref 18–48)
LYMPHOCYTES NFR BLD: 8.4 % (ref 18–48)
MCH RBC QN AUTO: 30.6 PG (ref 27–31)
MCH RBC QN AUTO: 30.8 PG (ref 27–31)
MCH RBC QN AUTO: 30.9 PG (ref 27–31)
MCHC RBC AUTO-ENTMCNC: 31.5 G/DL (ref 32–36)
MCHC RBC AUTO-ENTMCNC: 32.1 G/DL (ref 32–36)
MCHC RBC AUTO-ENTMCNC: 32.4 G/DL (ref 32–36)
MCV RBC AUTO: 95 FL (ref 82–98)
MCV RBC AUTO: 96 FL (ref 82–98)
MCV RBC AUTO: 97 FL (ref 82–98)
METHADONE UR QL SCN>300 NG/ML: NEGATIVE
MICROSCOPIC COMMENT: ABNORMAL
MONOCYTES # BLD AUTO: 1 K/UL (ref 0.3–1)
MONOCYTES # BLD AUTO: 1 K/UL (ref 0.3–1)
MONOCYTES # BLD AUTO: 1.1 K/UL (ref 0.3–1)
MONOCYTES NFR BLD: 6.8 % (ref 4–15)
MONOCYTES NFR BLD: 7.1 % (ref 4–15)
MONOCYTES NFR BLD: 9 % (ref 4–15)
NEUTROPHILS # BLD AUTO: 10 K/UL (ref 1.8–7.7)
NEUTROPHILS # BLD AUTO: 11.5 K/UL (ref 1.8–7.7)
NEUTROPHILS # BLD AUTO: 11.9 K/UL (ref 1.8–7.7)
NEUTROPHILS NFR BLD: 82.5 % (ref 38–73)
NEUTROPHILS NFR BLD: 84.2 % (ref 38–73)
NEUTROPHILS NFR BLD: 84.2 % (ref 38–73)
NITRITE UR QL STRIP: NEGATIVE
NRBC BLD-RTO: 0 /100 WBC
OPIATES UR QL SCN: NEGATIVE
OSMOLALITY SERPL: 307 MOSM/KG (ref 275–295)
PCP UR QL SCN>25 NG/ML: NEGATIVE
PH UR STRIP: 6 [PH] (ref 5–8)
PLATELET # BLD AUTO: 340 K/UL (ref 150–350)
PLATELET # BLD AUTO: 361 K/UL (ref 150–350)
PLATELET # BLD AUTO: 382 K/UL (ref 150–350)
PMV BLD AUTO: 10.2 FL (ref 9.2–12.9)
PMV BLD AUTO: 10.4 FL (ref 9.2–12.9)
PMV BLD AUTO: 10.4 FL (ref 9.2–12.9)
POC IONIZED CALCIUM: 1.01 MMOL/L (ref 1.06–1.42)
POC TCO2 (MEASURED): 26 MMOL/L (ref 23–29)
POCT GLUCOSE: 328 MG/DL (ref 70–110)
POCT GLUCOSE: 361 MG/DL (ref 70–110)
POTASSIUM BLD-SCNC: 4.9 MMOL/L (ref 3.5–5.1)
POTASSIUM SERPL-SCNC: 3.4 MMOL/L (ref 3.5–5.1)
POTASSIUM SERPL-SCNC: 4 MMOL/L (ref 3.5–5.1)
PROT SERPL-MCNC: 7.4 G/DL (ref 6–8.4)
PROT UR QL STRIP: ABNORMAL
PROTHROMBIN TIME: 10.9 SEC (ref 9–12.5)
PROTHROMBIN TIME: 11 SEC (ref 9–12.5)
RBC # BLD AUTO: 4.06 M/UL (ref 4–5.4)
RBC # BLD AUTO: 4.08 M/UL (ref 4–5.4)
RBC # BLD AUTO: 4.34 M/UL (ref 4–5.4)
RBC #/AREA URNS AUTO: 22 /HPF (ref 0–4)
SAMPLE: ABNORMAL
SODIUM BLD-SCNC: 133 MMOL/L (ref 136–145)
SODIUM SERPL-SCNC: 133 MMOL/L (ref 136–145)
SODIUM SERPL-SCNC: 133 MMOL/L (ref 136–145)
SP GR UR STRIP: 1.02 (ref 1–1.03)
SPECIMEN SOURCE: NORMAL
SQUAMOUS #/AREA URNS AUTO: 1 /HPF
TOXICOLOGY INFORMATION: NORMAL
TROPONIN I SERPL DL<=0.01 NG/ML-MCNC: 0.16 NG/ML (ref 0–0.03)
TROPONIN I SERPL DL<=0.01 NG/ML-MCNC: 0.18 NG/ML (ref 0–0.03)
TSH SERPL DL<=0.005 MIU/L-ACNC: 0.56 UIU/ML (ref 0.4–4)
URN SPEC COLLECT METH UR: ABNORMAL
WBC # BLD AUTO: 12.11 K/UL (ref 3.9–12.7)
WBC # BLD AUTO: 13.61 K/UL (ref 3.9–12.7)
WBC # BLD AUTO: 14.16 K/UL (ref 3.9–12.7)
WBC #/AREA URNS AUTO: 2 /HPF (ref 0–5)

## 2020-01-02 PROCEDURE — 83690 ASSAY OF LIPASE: CPT

## 2020-01-02 PROCEDURE — 99291 CRITICAL CARE FIRST HOUR: CPT | Mod: ,,, | Performed by: EMERGENCY MEDICINE

## 2020-01-02 PROCEDURE — 87502 INFLUENZA DNA AMP PROBE: CPT

## 2020-01-02 PROCEDURE — 80047 BASIC METABLC PNL IONIZED CA: CPT

## 2020-01-02 PROCEDURE — 11000001 HC ACUTE MED/SURG PRIVATE ROOM

## 2020-01-02 PROCEDURE — 84484 ASSAY OF TROPONIN QUANT: CPT | Mod: 91

## 2020-01-02 PROCEDURE — 82550 ASSAY OF CK (CPK): CPT

## 2020-01-02 PROCEDURE — 81001 URINALYSIS AUTO W/SCOPE: CPT

## 2020-01-02 PROCEDURE — 25000003 PHARM REV CODE 250: Performed by: EMERGENCY MEDICINE

## 2020-01-02 PROCEDURE — 25500020 PHARM REV CODE 255: Performed by: EMERGENCY MEDICINE

## 2020-01-02 PROCEDURE — 93010 EKG 12-LEAD: ICD-10-PCS | Mod: ,,, | Performed by: INTERNAL MEDICINE

## 2020-01-02 PROCEDURE — S0028 INJECTION, FAMOTIDINE, 20 MG: HCPCS | Performed by: STUDENT IN AN ORGANIZED HEALTH CARE EDUCATION/TRAINING PROGRAM

## 2020-01-02 PROCEDURE — C9399 UNCLASSIFIED DRUGS OR BIOLOG: HCPCS | Performed by: STUDENT IN AN ORGANIZED HEALTH CARE EDUCATION/TRAINING PROGRAM

## 2020-01-02 PROCEDURE — 36415 COLL VENOUS BLD VENIPUNCTURE: CPT

## 2020-01-02 PROCEDURE — 85610 PROTHROMBIN TIME: CPT | Mod: 91

## 2020-01-02 PROCEDURE — 83880 ASSAY OF NATRIURETIC PEPTIDE: CPT

## 2020-01-02 PROCEDURE — 80307 DRUG TEST PRSMV CHEM ANLYZR: CPT

## 2020-01-02 PROCEDURE — 85730 THROMBOPLASTIN TIME PARTIAL: CPT

## 2020-01-02 PROCEDURE — 84484 ASSAY OF TROPONIN QUANT: CPT

## 2020-01-02 PROCEDURE — 93005 ELECTROCARDIOGRAM TRACING: CPT

## 2020-01-02 PROCEDURE — 85610 PROTHROMBIN TIME: CPT

## 2020-01-02 PROCEDURE — 99283 EMERGENCY DEPT VISIT LOW MDM: CPT | Mod: ,,, | Performed by: INTERNAL MEDICINE

## 2020-01-02 PROCEDURE — 63600175 PHARM REV CODE 636 W HCPCS: Performed by: STUDENT IN AN ORGANIZED HEALTH CARE EDUCATION/TRAINING PROGRAM

## 2020-01-02 PROCEDURE — 80053 COMPREHEN METABOLIC PANEL: CPT

## 2020-01-02 PROCEDURE — 85730 THROMBOPLASTIN TIME PARTIAL: CPT | Mod: 91

## 2020-01-02 PROCEDURE — 83605 ASSAY OF LACTIC ACID: CPT

## 2020-01-02 PROCEDURE — 96365 THER/PROPH/DIAG IV INF INIT: CPT

## 2020-01-02 PROCEDURE — 99283 PR EMERGENCY DEPT VISIT,LEVEL III: ICD-10-PCS | Mod: ,,, | Performed by: INTERNAL MEDICINE

## 2020-01-02 PROCEDURE — 84443 ASSAY THYROID STIM HORMONE: CPT

## 2020-01-02 PROCEDURE — 63600175 PHARM REV CODE 636 W HCPCS: Performed by: EMERGENCY MEDICINE

## 2020-01-02 PROCEDURE — 87040 BLOOD CULTURE FOR BACTERIA: CPT

## 2020-01-02 PROCEDURE — 93010 ELECTROCARDIOGRAM REPORT: CPT | Mod: ,,, | Performed by: INTERNAL MEDICINE

## 2020-01-02 PROCEDURE — 85025 COMPLETE CBC W/AUTO DIFF WBC: CPT

## 2020-01-02 PROCEDURE — 99223 1ST HOSP IP/OBS HIGH 75: CPT | Mod: ,,,

## 2020-01-02 PROCEDURE — 99223 PR INITIAL HOSPITAL CARE,LEVL III: ICD-10-PCS | Mod: ,,,

## 2020-01-02 PROCEDURE — 80048 BASIC METABOLIC PNL TOTAL CA: CPT

## 2020-01-02 PROCEDURE — 99285 EMERGENCY DEPT VISIT HI MDM: CPT | Mod: 25

## 2020-01-02 PROCEDURE — 83930 ASSAY OF BLOOD OSMOLALITY: CPT

## 2020-01-02 PROCEDURE — 25000003 PHARM REV CODE 250: Performed by: STUDENT IN AN ORGANIZED HEALTH CARE EDUCATION/TRAINING PROGRAM

## 2020-01-02 PROCEDURE — 99291 PR CRITICAL CARE, E/M 30-74 MINUTES: ICD-10-PCS | Mod: ,,, | Performed by: EMERGENCY MEDICINE

## 2020-01-02 RX ORDER — INSULIN ASPART 100 [IU]/ML
0-5 INJECTION, SOLUTION INTRAVENOUS; SUBCUTANEOUS
Status: DISCONTINUED | OUTPATIENT
Start: 2020-01-02 | End: 2020-01-04

## 2020-01-02 RX ORDER — FAMOTIDINE 10 MG/ML
20 INJECTION INTRAVENOUS 2 TIMES DAILY
Status: DISCONTINUED | OUTPATIENT
Start: 2020-01-02 | End: 2020-01-04

## 2020-01-02 RX ORDER — CLOPIDOGREL BISULFATE 75 MG/1
75 TABLET ORAL DAILY
Status: DISCONTINUED | OUTPATIENT
Start: 2020-01-03 | End: 2020-01-03

## 2020-01-02 RX ORDER — PROMETHAZINE HYDROCHLORIDE 25 MG/1
25 TABLET ORAL EVERY 6 HOURS PRN
Status: DISCONTINUED | OUTPATIENT
Start: 2020-01-02 | End: 2020-01-14

## 2020-01-02 RX ORDER — CLOPIDOGREL 300 MG/1
300 TABLET, FILM COATED ORAL ONCE
Status: COMPLETED | OUTPATIENT
Start: 2020-01-02 | End: 2020-01-02

## 2020-01-02 RX ORDER — GLUCAGON 1 MG
1 KIT INJECTION
Status: DISCONTINUED | OUTPATIENT
Start: 2020-01-02 | End: 2020-01-04

## 2020-01-02 RX ORDER — NAPROXEN SODIUM 220 MG/1
324 TABLET, FILM COATED ORAL
Status: COMPLETED | OUTPATIENT
Start: 2020-01-02 | End: 2020-01-02

## 2020-01-02 RX ORDER — POTASSIUM CHLORIDE 20 MEQ/1
40 TABLET, EXTENDED RELEASE ORAL
Status: DISCONTINUED | OUTPATIENT
Start: 2020-01-02 | End: 2020-01-02

## 2020-01-02 RX ORDER — POTASSIUM CHLORIDE 7.45 MG/ML
10 INJECTION INTRAVENOUS
Status: DISCONTINUED | OUTPATIENT
Start: 2020-01-02 | End: 2020-01-02

## 2020-01-02 RX ORDER — ACETAMINOPHEN 325 MG/1
650 TABLET ORAL EVERY 4 HOURS PRN
Status: DISCONTINUED | OUTPATIENT
Start: 2020-01-02 | End: 2020-01-14

## 2020-01-02 RX ORDER — IBUPROFEN 200 MG
24 TABLET ORAL
Status: DISCONTINUED | OUTPATIENT
Start: 2020-01-02 | End: 2020-01-04

## 2020-01-02 RX ORDER — ATORVASTATIN CALCIUM 20 MG/1
80 TABLET, FILM COATED ORAL DAILY
Status: DISCONTINUED | OUTPATIENT
Start: 2020-01-03 | End: 2020-01-13

## 2020-01-02 RX ORDER — GABAPENTIN 300 MG/1
300 CAPSULE ORAL 3 TIMES DAILY
Status: DISCONTINUED | OUTPATIENT
Start: 2020-01-02 | End: 2020-01-06

## 2020-01-02 RX ORDER — ESCITALOPRAM OXALATE 10 MG/1
10 TABLET ORAL DAILY
Status: DISCONTINUED | OUTPATIENT
Start: 2020-01-03 | End: 2020-01-14

## 2020-01-02 RX ORDER — NITROGLYCERIN 0.4 MG/1
0.4 TABLET SUBLINGUAL
Status: COMPLETED | OUTPATIENT
Start: 2020-01-02 | End: 2020-01-02

## 2020-01-02 RX ORDER — ONDANSETRON 2 MG/ML
4 INJECTION INTRAMUSCULAR; INTRAVENOUS EVERY 8 HOURS PRN
Status: DISCONTINUED | OUTPATIENT
Start: 2020-01-02 | End: 2020-01-04

## 2020-01-02 RX ORDER — LISINOPRIL 20 MG/1
40 TABLET ORAL DAILY
Status: DISCONTINUED | OUTPATIENT
Start: 2020-01-03 | End: 2020-01-04

## 2020-01-02 RX ORDER — FAMOTIDINE 10 MG/ML
20 INJECTION INTRAVENOUS DAILY
Status: DISCONTINUED | OUTPATIENT
Start: 2020-01-02 | End: 2020-01-02

## 2020-01-02 RX ORDER — CARVEDILOL 12.5 MG/1
12.5 TABLET ORAL 2 TIMES DAILY WITH MEALS
Status: DISCONTINUED | OUTPATIENT
Start: 2020-01-02 | End: 2020-01-05

## 2020-01-02 RX ORDER — SODIUM CHLORIDE 0.9 % (FLUSH) 0.9 %
10 SYRINGE (ML) INJECTION
Status: DISCONTINUED | OUTPATIENT
Start: 2020-01-02 | End: 2020-01-14

## 2020-01-02 RX ORDER — IBUPROFEN 200 MG
16 TABLET ORAL
Status: DISCONTINUED | OUTPATIENT
Start: 2020-01-02 | End: 2020-01-04

## 2020-01-02 RX ORDER — NAPROXEN SODIUM 220 MG/1
81 TABLET, FILM COATED ORAL DAILY
Status: DISCONTINUED | OUTPATIENT
Start: 2020-01-03 | End: 2020-01-14

## 2020-01-02 RX ORDER — HEPARIN SODIUM,PORCINE/D5W 25000/250
12 INTRAVENOUS SOLUTION INTRAVENOUS CONTINUOUS
Status: DISCONTINUED | OUTPATIENT
Start: 2020-01-02 | End: 2020-01-04

## 2020-01-02 RX ORDER — FUROSEMIDE 10 MG/ML
80 INJECTION INTRAMUSCULAR; INTRAVENOUS 2 TIMES DAILY
Status: DISCONTINUED | OUTPATIENT
Start: 2020-01-02 | End: 2020-01-04

## 2020-01-02 RX ORDER — PANTOPRAZOLE SODIUM 40 MG/1
40 TABLET, DELAYED RELEASE ORAL DAILY
Status: DISCONTINUED | OUTPATIENT
Start: 2020-01-03 | End: 2020-01-02

## 2020-01-02 RX ORDER — PANTOPRAZOLE SODIUM 40 MG/1
40 TABLET, DELAYED RELEASE ORAL DAILY
Status: DISCONTINUED | OUTPATIENT
Start: 2020-01-02 | End: 2020-01-02

## 2020-01-02 RX ORDER — INSULIN ASPART 100 [IU]/ML
7 INJECTION, SOLUTION INTRAVENOUS; SUBCUTANEOUS
Status: DISCONTINUED | OUTPATIENT
Start: 2020-01-02 | End: 2020-01-03

## 2020-01-02 RX ADMIN — GABAPENTIN 300 MG: 300 CAPSULE ORAL at 10:01

## 2020-01-02 RX ADMIN — NITROGLYCERIN 0.4 MG: 0.4 TABLET SUBLINGUAL at 12:01

## 2020-01-02 RX ADMIN — HEPARIN SODIUM 14 UNITS/KG/HR: 10000 INJECTION, SOLUTION INTRAVENOUS at 09:01

## 2020-01-02 RX ADMIN — IOHEXOL 100 ML: 350 INJECTION, SOLUTION INTRAVENOUS at 12:01

## 2020-01-02 RX ADMIN — FUROSEMIDE 80 MG: 40 INJECTION, SOLUTION INTRAMUSCULAR; INTRAVENOUS at 04:01

## 2020-01-02 RX ADMIN — ASPIRIN 81 MG 324 MG: 81 TABLET ORAL at 12:01

## 2020-01-02 RX ADMIN — INSULIN DETEMIR 40 UNITS: 100 INJECTION, SOLUTION SUBCUTANEOUS at 10:01

## 2020-01-02 RX ADMIN — HEPARIN SODIUM 12 UNITS/KG/HR: 10000 INJECTION, SOLUTION INTRAVENOUS at 01:01

## 2020-01-02 RX ADMIN — ONDANSETRON 4 MG: 2 INJECTION INTRAMUSCULAR; INTRAVENOUS at 05:01

## 2020-01-02 RX ADMIN — POTASSIUM CHLORIDE 10 MEQ: 10 INJECTION, SOLUTION INTRAVENOUS at 09:01

## 2020-01-02 RX ADMIN — FAMOTIDINE 20 MG: 10 INJECTION, SOLUTION INTRAVENOUS at 04:01

## 2020-01-02 RX ADMIN — CARVEDILOL 12.5 MG: 12.5 TABLET, FILM COATED ORAL at 04:01

## 2020-01-02 RX ADMIN — CLOPIDOGREL BISULFATE 300 MG: 300 TABLET, FILM COATED ORAL at 04:01

## 2020-01-02 RX ADMIN — INSULIN ASPART 2 UNITS: 100 INJECTION, SOLUTION INTRAVENOUS; SUBCUTANEOUS at 10:01

## 2020-01-02 RX ADMIN — POTASSIUM CHLORIDE 10 MEQ: 10 INJECTION, SOLUTION INTRAVENOUS at 06:01

## 2020-01-02 NOTE — ASSESSMENT & PLAN NOTE
Patient having two days of non-bloody non-bilious emesis with poor P.O. Intake    PLAN:  -- IV 4mg Zofran Q4H PRN   -- P.O. Phenergan 25 mg PRN  -- EKG daily to assess for QTc. 1/2/20 EKG QTc - 453

## 2020-01-02 NOTE — HPI
"Ms. Connelly is a 55 y/o AAF with known medical medical issues of chronic combined systolic and diastolic HF (EF 35% Grade II DD), CAD S/P MI 2010, Angiogram 12/18/19 mid LAD 90% stenosis, mid OM2 75% stenosis, and Ostial D1 70% stenosis LVDEP 25 mmHg, PAD S/P PTA with stents BLE 2015, IDDM (A1c 10.3%), HTN, HLD, GERD, Anxiety/ Depression. Patient present with a 2 day history of nausea and with multiple episodes of non-bilious non-bloody emesis and couple of episodes of watery non-bloody diarrhea. Patient reports initially feeling HARTMAN without associated chest pain/tightness for the past week before progressively worsening to the GI symptoms described. She reports having a 7 lb weight gain (dry weight unknown) and only able to walk less than 20-30 ft before feeling short of breath. She reports that 5-10 minute rest resolves her dyspnea and she never has shortness of breath at rest. She reports feeling similar symptoms to this when she was admitted to the hospital on 12/6/19 for ADHF. On Monday 12/30 she doubled her home torsemide dosage to help reduce the excess water weight but reports that it did not help much. When he GI symptoms developed on Tuesday, she reports being unable to tolerate anything P.O. and subsequently stopped taking all of her home medications including her home insulin. What prompted her to come into the hospital was a fluttering feeling in her chest associated with some diaphoresis. She reports not remembering the symptoms she felt when she was having her MI in 2010 but  at bedside reported that she had nausea and vomiting leading up to her MI and she collapsed and lost consciousness and then later once admitted to the hospital was told she had had a heart attack. Currently the patient reports feeling nauseated, diaphoretic, a mild headache, epigastric pain, with a vague "heart burn" like feeling in her chest. She denies light headedness, chest tightness, dyspnea, palpitations, dysuria, " constipation, edema. She denies any recent sick contacts and fevers at home.    In the ED, , EKG showed sinus tachycardia without ST changes, 1st troponin 0.176, BNP 1,013, CXR - mild interstitial edema with pulmonary vascular congestion. She was seen by cardiology who suggested an ACS work up due to her recent angiogram showing vessel blockages and her being a diabetic with similar vague symptoms from last MI. The also suggested for CTS to be consulted to assess for CABG. Patient was admitted to hospital medicine Team 2 for further management and work up.

## 2020-01-02 NOTE — ED PROVIDER NOTES
Encounter Date: 1/2/2020       History     Chief Complaint   Patient presents with    Chest Pain     chest pain, nausea, vomiting since last night     HPI   Ms. Connelly is a 54 y.o. female with cCHF (last EF 35%), DM, HTN, CAD anxiety/depression here today with chest fluttering and vomiting. Reports yesterday she began to develop loose stools with associated vomiting.  Vomited 3 times this morning with multiple episodes last night as well. States she is unable to keep anything down.  Has associated diffuse nonspecific abdominal pain that started after the vomiting and diarrhea.  Denies blood in the stools.  Has redness to the vomiting this morning.  States at rest this morning, she has had some chest fluttering which prompted her visit to the ED.  She is up about 7 lb since her discharge where she was diagnosed with heart failure last month.  Denies fevers, chills, chest pain, dysuria.        Review of patient's allergies indicates:   Allergen Reactions    Pcn [penicillins]      rash     Past Medical History:   Diagnosis Date    Anxiety     Depression     Diabetes mellitus     type 2    GERD (gastroesophageal reflux disease)     Hyperlipemia     Hypertension     Myocardial infarction 2010    minor-caused by stress per pt.     Past Surgical History:   Procedure Laterality Date    Angiogram - Right Extremity Right 7/9/15    angiogram-left leg  10/6/15    CATHETERIZATION OF BOTH LEFT AND RIGHT HEART N/A 12/18/2019    Procedure: CATHETERIZATION, HEART, BOTH LEFT AND RIGHT;  Surgeon: Que Fernando III, MD;  Location: [facility] CATH LAB;  Service: Cardiology;  Laterality: N/A;    CORONARY ANGIOGRAPHY N/A 12/18/2019    Procedure: ANGIOGRAM, CORONARY ARTERY;  Surgeon: Que Fernando III, MD;  Location: [facility] CATH LAB;  Service: Cardiology;  Laterality: N/A;     Family History   Problem Relation Age of Onset    Anesthesia problems Neg Hx      Social History     Tobacco Use    Smoking status: Never Smoker     Smokeless tobacco: Never Used   Substance Use Topics    Alcohol use: No    Drug use: Yes     Types: Marijuana     Comment: used yesterday     Review of Systems   Constitutional: Negative for fatigue and fever.   HENT: Negative for sore throat.    Respiratory: Negative for cough and shortness of breath.    Cardiovascular: Positive for palpitations and leg swelling. Negative for chest pain.   Gastrointestinal: Positive for abdominal pain, diarrhea, nausea and vomiting. Negative for abdominal distention and constipation.   Genitourinary: Negative for dysuria.   Musculoskeletal: Negative for back pain.   Skin: Negative for rash.   Neurological: Negative for weakness.   Hematological: Does not bruise/bleed easily.       Physical Exam     Initial Vitals [01/02/20 1031]   BP Pulse Resp Temp SpO2   (!) 160/80 (!) 112 (!) 22 98.3 °F (36.8 °C) 98 %      MAP       --         Physical Exam    Nursing note and vitals reviewed.  Constitutional: She appears well-developed and well-nourished. She is not diaphoretic. No distress.   HENT:   Head: Normocephalic and atraumatic.   Right Ear: External ear normal.   Left Ear: External ear normal.   Neck: Neck supple.   Cardiovascular: Regular rhythm, normal heart sounds and intact distal pulses. Tachycardia present.    Pulmonary/Chest: Breath sounds normal. No respiratory distress. She has no wheezes. She has no rhonchi. She has no rales.   Abdominal: Soft. She exhibits no distension. There is tenderness ( diffuse mild). There is no rebound and no guarding.   Musculoskeletal: She exhibits edema (Moderate pitting bilateral lower extremities.).   Neurological: She is alert and oriented to person, place, and time. GCS score is 15. GCS eye subscore is 4. GCS verbal subscore is 5. GCS motor subscore is 6.   Skin: Skin is warm. Capillary refill takes less than 2 seconds. No rash noted.   Psychiatric: She has a normal mood and affect.         ED Course   Procedures  Labs Reviewed   CBC W/  AUTO DIFFERENTIAL - Abnormal; Notable for the following components:       Result Value    WBC 13.61 (*)     Platelets 382 (*)     Gran # (ANC) 11.5 (*)     Immature Grans (Abs) 0.07 (*)     Gran% 84.2 (*)     Lymph% 8.1 (*)     All other components within normal limits   TROPONIN I - Abnormal; Notable for the following components:    Troponin I 0.176 (*)     All other components within normal limits   B-TYPE NATRIURETIC PEPTIDE - Abnormal; Notable for the following components:    BNP 1,013 (*)     All other components within normal limits   URINALYSIS, REFLEX TO URINE CULTURE - Abnormal; Notable for the following components:    Protein, UA 3+ (*)     Glucose, UA 2+ (*)     Occult Blood UA 2+ (*)     All other components within normal limits    Narrative:     Preferred Collection Type->Urine, Clean Catch   COMPREHENSIVE METABOLIC PANEL - Abnormal; Notable for the following components:    Sodium 133 (*)     Potassium 3.4 (*)     Glucose 357 (*)     BUN, Bld 26 (*)     Albumin 2.5 (*)     ALT 9 (*)     eGFR if  59.6 (*)     eGFR if non  51.7 (*)     All other components within normal limits    Narrative:     ADD-ON CBCWD #[id number] PER PRISCILA BRADSHAW MD 13:32  01/02/2020    CBC W/ AUTO DIFFERENTIAL - Abnormal; Notable for the following components:    Mean Corpuscular Hemoglobin Conc 31.5 (*)     Immature Granulocytes 0.9 (*)     Gran # (ANC) 10.0 (*)     Immature Grans (Abs) 0.11 (*)     Lymph # 0.9 (*)     Mono # 1.1 (*)     Gran% 82.5 (*)     Lymph% 7.4 (*)     All other components within normal limits    Narrative:     ADD-ON CBCWD #[id number] PER PRISCILA BRADSHAW MD 13:32  01/02/2020    OSMOLALITY, SERUM - Abnormal; Notable for the following components:    Osmolality 307 (*)     All other components within normal limits   APTT - Abnormal; Notable for the following components:    aPTT 40.4 (*)     All other components within normal limits   CBC W/ AUTO DIFFERENTIAL -  Abnormal; Notable for the following components:    WBC 14.16 (*)     Platelets 361 (*)     Gran # (ANC) 11.9 (*)     Immature Grans (Abs) 0.05 (*)     Gran% 84.2 (*)     Lymph% 8.4 (*)     All other components within normal limits   APTT - Abnormal; Notable for the following components:    aPTT 40.4 (*)     All other components within normal limits   TROPONIN I - Abnormal; Notable for the following components:    Troponin I 0.162 (*)     All other components within normal limits   BASIC METABOLIC PANEL - Abnormal; Notable for the following components:    Sodium 133 (*)     CO2 20 (*)     Glucose 321 (*)     BUN, Bld 27 (*)     eGFR if  59.6 (*)     eGFR if non  51.7 (*)     All other components within normal limits   APTT - Abnormal; Notable for the following components:    aPTT 32.4 (*)     All other components within normal limits   URINALYSIS MICROSCOPIC - Abnormal; Notable for the following components:    RBC, UA 22 (*)     All other components within normal limits    Narrative:     Preferred Collection Type->Urine, Clean Catch   ISTAT PROCEDURE - Abnormal; Notable for the following components:    POC Glucose 376 (*)     POC BUN 36 (*)     POC Sodium 133 (*)     POC Ionized Calcium 1.01 (*)     All other components within normal limits   POCT GLUCOSE - Abnormal; Notable for the following components:    POCT Glucose 361 (*)     All other components within normal limits   INFLUENZA A & B BY MOLECULAR   CULTURE, STOOL   PROTIME-INR   APTT   PROTIME-INR   CBC W/ AUTO DIFFERENTIAL   LIPASE    Narrative:     ADD-ON CBCWD #[id number] PER PRISCILA BRADSHAW MD 13:32  01/02/2020    APTT    Narrative:     ADD-ON APTT #[id number] PER PRISCILA BRADSHAW MD 13:29  01/02/2020    LACTIC ACID, PLASMA   TSH   PROTIME-INR   OCCULT BLOOD X 1, STOOL   STOOL EXAM-OVA,CYSTS,PARASITES   WBC, STOOL   POCT GLUCOSE, HAND-HELD DEVICE   POCT GLUCOSE, HAND-HELD DEVICE   ISTAT CHEM8     EKG Readings:  (Independently Interpreted)   Sinus tachycardia rate 102.  Normal intervals.  No ischemic changes.  No STEMI.     ECG Results          EKG 12-lead (Final result)  Result time 01/02/20 15:54:10    Final result by Interface, Lab In [facility] (01/02/20 15:54:10)                 Narrative:    Test Reason : R07.9,    Vent. Rate : 102 BPM     Atrial Rate : 102 BPM     P-R Int : 136 ms          QRS Dur : 076 ms      QT Int : 348 ms       P-R-T Axes : 066 051 089 degrees     QTc Int : 453 ms    Sinus tachycardia  Otherwise normal ECG  When compared with ECG of 18-DEC-2019 07:48,  Nonspecific T wave abnormality, improved in Lateral leads  Confirmed by Armando Jay MD ([id number]) on 1/2/2020 3:54:01 PM    Referred By: [email or web address]   SELF           Confirmed By:Armando Jay MD                            Imaging Results          CT Abdomen Pelvis With Contrast (Final result)  Result time 01/02/20 13:53:41    Final result by Feliciano Mas MD (01/02/20 13:53:41)                 Impression:      Large heterogeneous uterine masses, likely fibroids, with compressive effect on the bladder.  Consider further evaluation with pelvic ultrasound if clinically warranted.    Additional findings as above.    Electronically signed by resident: Sisi Mora  Date:    01/02/2020  Time:    13:19    Electronically signed by: Feliciano Mas MD  Date:    01/02/2020  Time:    13:53             Narrative:    EXAMINATION:  CT ABDOMEN PELVIS WITH CONTRAST    CLINICAL HISTORY:  Nausea, vomiting, diarrhea;    TECHNIQUE:  Low dose axial images, sagittal and coronal reformations were obtained from the lung bases to the pubic symphysis after the administration of 100 cc Omnipaque 350 intravenous contrast.  Oral contrast was not administered.    COMPARISON:  CTA chest non coronary 12/06/2019, CTA abdomen 06/01/2018    FINDINGS:  LOWER THORACIC:    Lung bases: There are patchy bibasilar ground-glass opacities suggestive of pulmonary edema or small airway disease.   No nodules or pleural fluid.    Heart/Pericardial structures: Unremarkable.    ABDOMEN:    GI tract & mesentery: The appendix is visualized in the right lower quadrant and appears unremarkable.No evidence of bowel obstruction or inflammatory process. No intraperitoneal free air or free fluid.    Liver: Normal in size and attenuation with no focal hepatic abnormality.    Gallbladder: No calcified gallstones.  No wall thickening or pericholecystic fluid.    Bile Ducts: No intra or extrahepatic biliary ductal dilatation.    Spleen: Unremarkable.    Pancreas: Unremarkable.    Adrenals: Unremarkable.    Peritoneal & retroperitoneal space: No intra or retroperitoneal free fluid or free air.  No significant adenopathy.    Kidneys/ureters: Normal in size and location.  No renal masses, stones, or hydronephrosis.  The ureters appear normal in course and caliber without evidence of ureteral dilatation.    PELVIS:    Urinary bladder: No bladder wall thickening    Reproductive: There is a large heterogeneous enhancing mass arising from the uterus measuring 14.4 x 9.1 x 9.4 cm causing some compressive effect on the bladder.  There are clusters of dystrophic calcifications within this mass.  There is also a 3.0 cm left uterine mass with peripheral calcifications which may represent involuting fibroid.    Other: No pelvic adenopathy or free fluid,.    VASCULATURE: The abdominal aorta is normal in course and caliber without significant atherosclerosis.    BONES: Mild degenerative changes in the spine.  No acute fracture or bony destructive process.    EXTRAPERITONEAL SOFT TISSUES: Unremarkable.                               X-Ray Chest AP Portable (Final result)  Result time 01/02/20 11:49:47    Final result by David Tilley III, MD (01/02/20 11:49:47)                 Impression:      Mild interstitial edema versus pneumonia.      Electronically signed by: David Tilley MD  Date:    01/02/2020  Time:    11:49              Narrative:    EXAMINATION:  XR CHEST AP PORTABLE    CLINICAL HISTORY:  CHF;    FINDINGS:  Heart size is normal there is mild edema aortic plaque and DJD.                                 Medical Decision Making:   History:   Old Medical Records: I decided to obtain old medical records.  Old Records Summarized: records from clinic visits and records from previous admission(s).       <> Summary of Records: Admitted last month for new onset heart failure.  EF 35%.  Had a heart catheterization last month which showed multivessel disease.  Was referred cardiac surgery for possible CABG.  Independently Interpreted Test(s):   I have ordered and independently interpreted X-rays - see prior notes.  I have ordered and independently interpreted EKG Reading(s) - see prior notes  Clinical Tests:   Lab Tests: Ordered and Reviewed  Radiological Study: Ordered and Reviewed  Medical Tests: Ordered and Reviewed  ED Management:  Tachycardic.  Hypertensive.  Here with vague abdominal pain with vomiting. States she has been able to keep anything down since yesterday.  Grossly euvolemic on exam, however she has had a 7 lb weight gain per her report.  Will rule out ACS, CHF exacerbation, and intra-abdominal emergency.  Will obtain CBC, CMP, BNP, INR, troponin, lipase, CT abdomen pelvis.    Labs reviewed; grossly WNL w/o actionable values except troponin 0.176, BNP 1013, K 3.4, sCr 1.2.  Clinical history with known obstructive CAD, suspect troponin indicative NSTEMI.   Discussed with cardiology who evaluated. They recommend treating for ACS and admission for to medicine for ACS treatment and CTS evaluation.    Discussed with hospital medicine who admitted pt.  Other:   I have discussed this case with another health care provider.       <> Summary of the Discussion: Cardiology and hospital medicine              Attending Attestation:         Attending Critical Care:   Critical Care Times:    ==============================================================  · Total Critical Care Time - exclusive of procedural time: 40 minutes.  ==============================================================  Critical care was necessary to treat or prevent imminent or life-threatening deterioration of the following conditions: myocardial infarction.   Critical care was time spent personally by me on the following activities: obtaining history from patient or relative, examination of patient, ordering lab, x-rays, and/or EKG, review of old charts, development of treatment plan with patient or relative, ordering and performing treatments and interventions, evaluation of patient's response to treatment, discussion with consultants, interpretation of cardiac measurements and re-evaluation of patient's conition.   Critical Care Condition: life-threatening                             Clinical Impression:       ICD-10-CM ICD-9-CM   1. NSTEMI (non-ST elevated myocardial infarction) I21.4 410.70   2. Chest pain R07.9 786.50   3. ACS (acute coronary syndrome) I24.9 411.1   4. CAD (coronary artery disease) I25.10 414.00   5. Hypokalemia E87.6 276.8         Disposition:   Disposition: Admitted  Condition: Fair                     Onur Suarez MD  01/04/20 0146

## 2020-01-02 NOTE — ASSESSMENT & PLAN NOTE
TTE 12/6/19: Moderately decreased left ventricular systolic function. The estimated ejection fraction is 35%. Grade II (moderate) left ventricular diastolic dysfunction consistent with pseudonormalization. Moderate global hypokinetic wall motion. Normal right ventricular systolic function. Severe left atrial enlargement. Mild-to-moderate mitral regurgitation. Mild to moderate tricuspid regurgitation. The estimated PA systolic pressure is 49 mm Hg.Pulmonary hypertension present. Normal central venous pressure (3 mm Hg).    CXR 1/2/20: Mild interstitial edema with pulmonary vasculature congestion    PLAN:  -- Continue Home Coreg 12.5 mg BID  -- Continue Home Lisinopril 40 mg QD  -- IV lasix 80 mg BID  -- Repeat TTE   -- Strict I/Os  -- Daily standing weights  -- Low sodium (<2g) diet with 1500 cc fluid restriction

## 2020-01-02 NOTE — SUBJECTIVE & OBJECTIVE
Past Medical History:   Diagnosis Date    Anxiety     Depression     Diabetes mellitus     type 2    GERD (gastroesophageal reflux disease)     Hyperlipemia     Hypertension     Myocardial infarction 2010    minor-caused by stress per pt.       Past Surgical History:   Procedure Laterality Date    Angiogram - Right Extremity Right 7/9/15    angiogram-left leg  10/6/15    CATHETERIZATION OF BOTH LEFT AND RIGHT HEART N/A 12/18/2019    Procedure: CATHETERIZATION, HEART, BOTH LEFT AND RIGHT;  Surgeon: Que Fernando III, MD;  Location: [facility] CATH LAB;  Service: Cardiology;  Laterality: N/A;    CORONARY ANGIOGRAPHY N/A 12/18/2019    Procedure: ANGIOGRAM, CORONARY ARTERY;  Surgeon: Que Fernando III, MD;  Location: [facility] CATH LAB;  Service: Cardiology;  Laterality: N/A;       Review of patient's allergies indicates:   Allergen Reactions    Pcn [penicillins]      rash       No current facility-administered medications on file prior to encounter.      Current Outpatient Medications on File Prior to Encounter   Medication Sig    aspirin 81 MG Chew Take 1 tablet (81 mg total) by mouth once daily.    atorvastatin (LIPITOR) 80 MG tablet Take 1 tablet (80 mg total) by mouth once daily.    blood sugar diagnostic (ONETOUCH ULTRA BLUE TEST STRIP) Strp Use to test blood glucose twice daily    blood sugar diagnostic Strp test  blood glucose 2 (two) times daily with meals.    blood-glucose meter Misc use as directed    blood-glucose meter Misc Use to test blood glucose    carvedilol (COREG) 12.5 MG tablet Take 1 tablet (12.5 mg total) by mouth 2 (two) times daily with meals.    escitalopram oxalate (LEXAPRO) 10 MG tablet Take 1 tablet (10 mg total) by mouth once daily.    famotidine (PEPCID) 20 MG tablet Take 1 tablet (20 mg total) by mouth 2 (two) times daily.    gabapentin (NEURONTIN) 300 MG capsule Take 1 capsule (300 mg total) by mouth 3 (three) times daily.    hydrOXYzine pamoate (VISTARIL) 50 MG Cap  "Take 1 capsule (50 mg total) by mouth every 6 (six) hours as needed (anxiety).    insulin aspart U-100 (NOVOLOG) 100 unit/mL injection Inject 9 Units into the skin 3 (three) times daily with meals.    insulin glargine 100 units/mL (3mL) SubQ pen Inject 50 Units into the skin every evening.    lancets 30 gauge Misc use to test blood glucose 2 (two) times daily with meals.    lisinopril (PRINIVIL,ZESTRIL) 40 MG tablet Take 1 tablet (40 mg total) by mouth once daily.    ondansetron (ZOFRAN-ODT) 8 MG TbDL Dissolve 1 tablet (8 mg total) by mouth every 8 (eight) hours as needed.    pen needle, diabetic 31 gauge x 5/16" Ndle Use to inject insulin 2 (two) times daily with meals.    pen needle, diabetic 32 gauge x 5/32" Ndle 1 each by Misc.(Non-Drug; Combo Route) route 2 (two) times daily with meals.    torsemide (DEMADEX) 20 MG Tab Take 2 tablets (40 mg total) by mouth once daily.    [DISCONTINUED] lancing device Misc 1 Device by Misc.(Non-Drug; Combo Route) route 2 (two) times daily with meals.     Family History     None        Tobacco Use    Smoking status: Never Smoker    Smokeless tobacco: Never Used   Substance and Sexual Activity    Alcohol use: No    Drug use: Yes     Types: Marijuana     Comment: used yesterday    Sexual activity: Not on file     Review of Systems   All other systems reviewed and are negative.    Objective:     Vital Signs (Most Recent):  Temp: 98.3 °F (36.8 °C) (01/02/20 1031)  Pulse: 104 (01/02/20 1426)  Resp: (!) 22 (01/02/20 1031)  BP: (!) 144/75 (01/02/20 1339)  SpO2: 97 % (01/02/20 1433) Vital Signs (24h Range):  Temp:  [98.3 °F (36.8 °C)] 98.3 °F (36.8 °C)  Pulse:  [100-112] 104  Resp:  [22] 22  SpO2:  [95 %-100 %] 97 %  BP: (141-160)/() 144/75     Weight: 98 kg (216 lb)  Body mass index is 36.5 kg/m².    SpO2: 97 %  O2 Device (Oxygen Therapy): room air    No intake or output data in the 24 hours ending 01/02/20 1517    Lines/Drains/Airways     None           "       Physical Exam   Constitutional: She is oriented to person, place, and time. She appears well-developed and well-nourished.   HENT:   Head: Normocephalic and atraumatic.   Eyes: No scleral icterus.   Cardiovascular: Regular rhythm and normal heart sounds.   Tachycardic    Pulmonary/Chest: Effort normal.   Decreased breath sounds b/l bases   Musculoskeletal: She exhibits no edema.   Neurological: She is alert and oriented to person, place, and time.   Skin: Skin is warm.   Psychiatric: She has a normal mood and affect.       Significant Labs: All pertinent lab results from the last 24 hours have been reviewed.    Significant Imaging: EKG: hpi

## 2020-01-02 NOTE — ASSESSMENT & PLAN NOTE
A1c 12/2019 - 10.3%    BG on admission was 347. Patient reports not taking her insulin for 2 days due to low P.O. Intake  Home regimen: Aspart 9 U WM + Lantus 50 U QHS    PLAN:  -- Started patient on 25% reduced home regimen and will titrate as needed. Aspart 7 U TIDWM and Detemir 40 U QHS  -- POC BG 4 X daily  -- LDSSI PRN  -- Goal -180

## 2020-01-02 NOTE — ASSESSMENT & PLAN NOTE
"53 y/o AAF with known medical medical issues of chronic combined systolic and diastolic HF (EF 35% Grade II DD), CAD S/P MI 2010, Angiogram 12/18/19 mid LAD 90% stenosis, mid OM2 75% stenosis, and Ostial D1 70% stenosis LVDEP 25 mmHg, PAD S/P PTA with stents BLE 2015, IDDM (A1c 10.3%), HTN, HLD presenting with HARTMAN and nausea/ vomiting and "heart burn" like chest discomfort similar in characteristic for her last MI in 2010.     EKG - Sinus tachycardia without ST changes  1st Troponin - 0.176  BNP - 1,013    Seen by Cardiology in the ED who suggested ACS work up and CTS consult due to multi-vessel coronary disease    PLAN:  -- Heparin gtt ACS protocol   --  mg in ED with 81 mg daily and Plavix load 300 mg X1 with 75 mg daily after that  -- Lasix 80mg IV BID   -- Recheck BMP 1800 to monitor potassium    -- Repeat TTE   -- EKG PRN  -- continuous cardiac monitoring  -- CTS Consulted - multivessel disease may need a CABG   -- Famotidine IV 20 mg BID - for prevention of GI Bleeds in ACS work up  -- High intensity atorvastatin 80 mg QHS  "

## 2020-01-02 NOTE — ED TRIAGE NOTES
" Pt reports chest pain and also "flutter feeling", nausea, vomiting (pt reports red emesis)  since last night. Pt reports a weight gain and +2 leg swelling. Pt aox4  "

## 2020-01-02 NOTE — H&P
[facility] Hospital Medicine  History & Physical    Patient Name: Suyapa Connelly  MRN: [id number]  Admission Date: 1/2/2020  Attending Physician: Lucas Bueno MD   Primary Care Provider: Erlinda Rahman NP    [facility] Medicine Team: [facility] HOSP MED 2 Batsheva Torres DO     Patient information was obtained from patient, past medical records and ER records.     Subjective:     Principal Problem:Acute coronary syndrome    Chief Complaint:   Chief Complaint   Patient presents with    Chest Pain     chest pain, nausea, vomiting since last night        HPI: Ms. Connelly is a 55 y/o AAF with known medical medical issues of chronic combined systolic and diastolic HF (EF 35% Grade II DD), CAD S/P MI 2010, Angiogram 12/18/19 mid LAD 90% stenosis, mid OM2 75% stenosis, and Ostial D1 70% stenosis LVDEP 25 mmHg, PAD S/P PTA with stents BLE 2015, IDDM (A1c 10.3%), HTN, HLD, GERD, Anxiety/ Depression. Patient present with a 2 day history of nausea and with multiple episodes of non-bilious non-bloody emesis and couple of episodes of watery non-bloody diarrhea. Patient reports initially feeling HARTMAN without associated chest pain/tightness for the past week before progressively worsening to the GI symptoms described. She reports having a 7 lb weight gain (dry weight unknown) and only able to walk less than 20-30 ft before feeling short of breath. She reports that 5-10 minute rest resolves her dyspnea and she never has shortness of breath at rest. She reports feeling similar symptoms to this when she was admitted to the hospital on 12/6/19 for ADHF. On Monday 12/30 she doubled her home torsemide dosage to help reduce the excess water weight but reports that it did not help much. When he GI symptoms developed on Tuesday, she reports being unable to tolerate anything P.O. and subsequently stopped taking all of her home medications including her home insulin. What prompted her to come into the hospital was a fluttering  "feeling in her chest associated with some diaphoresis. She reports not remembering the symptoms she felt when she was having her MI in 2010 but  at bedside reported that she had nausea and vomiting leading up to her MI and she collapsed and lost consciousness and then later once admitted to the hospital was told she had had a heart attack. Currently the patient reports feeling nauseated, diaphoretic, a mild headache, epigastric pain, with a vague "heart burn" like feeling in her chest. She denies light headedness, chest tightness, dyspnea, palpitations, dysuria, constipation, edema. She denies any recent sick contacts and fevers at home.    In the ED, , EKG showed sinus tachycardia without ST changes, 1st troponin 0.176, BNP 1,013, CXR - mild interstitial edema with pulmonary vascular congestion. She was seen by cardiology who suggested an ACS work up due to her recent angiogram showing vessel blockages and her being a diabetic with similar vague symptoms from last MI. The also suggested for CTS to be consulted to assess for CABG. Patient was admitted to hospital medicine Team 2 for further management and work up.      Past Medical History:   Diagnosis Date    Anxiety     Depression     Diabetes mellitus     type 2    GERD (gastroesophageal reflux disease)     Hyperlipemia     Hypertension     Myocardial infarction 2010    minor-caused by stress per pt.       Past Surgical History:   Procedure Laterality Date    Angiogram - Right Extremity Right 7/9/15    angiogram-left leg  10/6/15    CATHETERIZATION OF BOTH LEFT AND RIGHT HEART N/A 12/18/2019    Procedure: CATHETERIZATION, HEART, BOTH LEFT AND RIGHT;  Surgeon: Que Fernando III, MD;  Location: [facility] CATH LAB;  Service: Cardiology;  Laterality: N/A;    CORONARY ANGIOGRAPHY N/A 12/18/2019    Procedure: ANGIOGRAM, CORONARY ARTERY;  Surgeon: Que Fernando III, MD;  Location: [facility] CATH LAB;  Service: Cardiology;  Laterality: N/A; " "      Review of patient's allergies indicates:   Allergen Reactions    Pcn [penicillins]      rash       No current facility-administered medications on file prior to encounter.      Current Outpatient Medications on File Prior to Encounter   Medication Sig    aspirin 81 MG Chew Take 1 tablet (81 mg total) by mouth once daily.    atorvastatin (LIPITOR) 80 MG tablet Take 1 tablet (80 mg total) by mouth once daily.    blood sugar diagnostic (ONETOUCH ULTRA BLUE TEST STRIP) Strp Use to test blood glucose twice daily    blood sugar diagnostic Strp test  blood glucose 2 (two) times daily with meals.    blood-glucose meter Misc use as directed    blood-glucose meter Misc Use to test blood glucose    carvedilol (COREG) 12.5 MG tablet Take 1 tablet (12.5 mg total) by mouth 2 (two) times daily with meals.    escitalopram oxalate (LEXAPRO) 10 MG tablet Take 1 tablet (10 mg total) by mouth once daily.    famotidine (PEPCID) 20 MG tablet Take 1 tablet (20 mg total) by mouth 2 (two) times daily.    insulin aspart U-100 (NOVOLOG) 100 unit/mL injection Inject 9 Units into the skin 3 (three) times daily with meals.    insulin glargine 100 units/mL (3mL) SubQ pen Inject 50 Units into the skin every evening.    lancets 30 gauge Misc use to test blood glucose 2 (two) times daily with meals.    lisinopril (PRINIVIL,ZESTRIL) 40 MG tablet Take 1 tablet (40 mg total) by mouth once daily.    ondansetron (ZOFRAN-ODT) 8 MG TbDL Dissolve 1 tablet (8 mg total) by mouth every 8 (eight) hours as needed.    pen needle, diabetic 31 gauge x 5/16" Ndle Use to inject insulin 2 (two) times daily with meals.    pen needle, diabetic 32 gauge x 5/32" Ndle 1 each by Misc.(Non-Drug; Combo Route) route 2 (two) times daily with meals.    torsemide (DEMADEX) 20 MG Tab Take 2 tablets (40 mg total) by mouth once daily.    gabapentin (NEURONTIN) 300 MG capsule Take 1 capsule (300 mg total) by mouth 3 (three) times daily.    hydrOXYzine " pamoate (VISTARIL) 50 MG Cap Take 1 capsule (50 mg total) by mouth every 6 (six) hours as needed (anxiety).    [DISCONTINUED] lancing device Misc 1 Device by Misc.(Non-Drug; Combo Route) route 2 (two) times daily with meals.     Family History     None        Tobacco Use    Smoking status: Never Smoker    Smokeless tobacco: Never Used   Substance and Sexual Activity    Alcohol use: No    Drug use: Yes     Types: Marijuana     Comment: used yesterday    Sexual activity: Not on file     Review of Systems   Constitutional: Positive for activity change, appetite change, chills, diaphoresis, fatigue and unexpected weight change. Negative for fever.   HENT: Negative for sinus pressure, sore throat and trouble swallowing.    Eyes: Negative for photophobia and visual disturbance.   Respiratory: Positive for shortness of breath. Negative for cough, choking, chest tightness and wheezing.    Cardiovascular: Positive for palpitations. Negative for chest pain and leg swelling.   Gastrointestinal: Positive for abdominal pain (diffuse (but mainly in epigastrium)), diarrhea, nausea and vomiting. Negative for abdominal distention, blood in stool and constipation.   Endocrine: Negative for polyphagia and polyuria.   Genitourinary: Negative for difficulty urinating, dysuria and flank pain.   Musculoskeletal: Negative for back pain and gait problem.   Skin: Negative for color change and pallor.   Neurological: Positive for weakness and headaches. Negative for tremors, syncope and light-headedness.   Psychiatric/Behavioral: Negative for agitation and confusion.     Objective:     Vital Signs (Most Recent):  Temp: 98.3 °F (36.8 °C) (01/02/20 1031)  Pulse: 104 (01/02/20 1426)  Resp: (!) 22 (01/02/20 1031)  BP: (!) 144/75 (01/02/20 1339)  SpO2: 97 % (01/02/20 1433) Vital Signs (24h Range):  Temp:  [98.3 °F (36.8 °C)] 98.3 °F (36.8 °C)  Pulse:  [100-112] 104  Resp:  [22] 22  SpO2:  [95 %-100 %] 97 %  BP: (141-160)/() 144/75      Weight: 98 kg (216 lb)  Body mass index is 36.5 kg/m².    Physical Exam   Constitutional: She is oriented to person, place, and time. She appears well-developed and well-nourished. She has a sickly appearance. She appears ill. She appears distressed.   HENT:   Head: Normocephalic and atraumatic.   Eyes: Conjunctivae and EOM are normal.   Neck: Normal range of motion. Neck supple. JVD present.   Cardiovascular: Regular rhythm and intact distal pulses. Tachycardia present.   Pulmonary/Chest: Effort normal. No respiratory distress. She has rales. She exhibits no tenderness.   Abdominal: Soft. Bowel sounds are normal. She exhibits no distension. There is tenderness (diffuse TTP). There is no rebound and no guarding.   Musculoskeletal: Normal range of motion. She exhibits no edema or tenderness.   Neurological: She is alert and oriented to person, place, and time. No sensory deficit.   Skin: Skin is warm and dry.   Psychiatric: She has a normal mood and affect. Her behavior is normal. Judgment and thought content normal.         CRANIAL NERVES     CN III, IV, VI   Extraocular motions are normal.        Significant Labs:   A1C:   Recent Labs   Lab 12/07/19  0132   HGBA1C 10.3*     CBC:   Recent Labs   Lab 01/02/20  1129 01/02/20  1133 01/02/20  1305   WBC 13.61*  --  12.11   HGB 13.4  --  12.5   HCT 41.8 44 39.7   *  --  340     CMP:   Recent Labs   Lab 01/02/20  1305   *   K 3.4*   CL 97   CO2 25   *   BUN 26*   CREATININE 1.2   CALCIUM 9.0   PROT 7.4   ALBUMIN 2.5*   BILITOT 0.8   ALKPHOS 98   AST 10   ALT 9*   ANIONGAP 11   EGFRNONAA 51.7*     Cardiac Markers:   Recent Labs   Lab 01/02/20  1129   BNP 1,013*     Coagulation:   Recent Labs   Lab 01/02/20  1129   INR 1.1   APTT 24.1     Lipase:   Recent Labs   Lab 01/02/20  1305   LIPASE 8     Troponin:   Recent Labs   Lab 01/02/20  1129   TROPONINI 0.176*     Significant Imaging: I have reviewed and interpreted all pertinent imaging  "results/findings within the past 24 hours.    Assessment/Plan:     * Acute coronary syndrome  53 y/o AAF with known medical medical issues of chronic combined systolic and diastolic HF (EF 35% Grade II DD), CAD S/P MI 2010, Angiogram 12/18/19 mid LAD 90% stenosis, mid OM2 75% stenosis, and Ostial D1 70% stenosis LVDEP 25 mmHg, PAD S/P PTA with stents BLE 2015, IDDM (A1c 10.3%), HTN, HLD presenting with HARTMAN and nausea/ vomiting and "heart burn" like chest discomfort similar in characteristic for her last MI in 2010.     EKG - Sinus tachycardia without ST changes  1st Troponin - 0.176  BNP - 1,013    Seen by Cardiology in the ED who suggested ACS work up and CTS consult due to multi-vessel coronary disease    PLAN:  -- Heparin gtt ACS protocol   --  mg in ED with 81 mg daily and Plavix load 300 mg X1 with 75 mg daily after that  -- Lasix 80mg IV BID   -- Recheck BMP 1800 to monitor potassium    -- Repeat TTE   -- EKG PRN  -- continuous cardiac monitoring  -- CTS Consulted - multivessel disease may need a CABG   -- Famotidine IV 20 mg BID - for prevention of GI Bleeds in ACS work up  -- High intensity atorvastatin 80 mg QHS    Insulin dependent diabetes mellitus  A1c 12/2019 - 10.3%    BG on admission was 347. Patient reports not taking her insulin for 2 days due to low P.O. Intake  Home regimen: Aspart 9 U WM + Lantus 50 U QHS    PLAN:  -- Started patient on 25% reduced home regimen and will titrate as needed. Aspart 7 U TIDWM and Detemir 40 U QHS  -- POC BG 4 X daily  -- LDSSI PRN  -- Goal -180      Acute on chronic combined systolic (congestive) and diastolic (congestive) heart failure  TTE 12/6/19: Moderately decreased left ventricular systolic function. The estimated ejection fraction is 35%. Grade II (moderate) left ventricular diastolic dysfunction consistent with pseudonormalization. Moderate global hypokinetic wall motion. Normal right ventricular systolic function. Severe left atrial enlargement. " Mild-to-moderate mitral regurgitation. Mild to moderate tricuspid regurgitation. The estimated PA systolic pressure is 49 mm Hg.Pulmonary hypertension present. Normal central venous pressure (3 mm Hg).    CXR 1/2/20: Mild interstitial edema with pulmonary vasculature congestion    PLAN:  -- Continue Home Coreg 12.5 mg BID  -- Continue Home Lisinopril 40 mg QD  -- IV lasix 80 mg BID  -- Repeat TTE   -- Strict I/Os  -- Daily standing weights  -- Low sodium (<2g) diet with 1500 cc fluid restriction             Mixed hyperlipidemia  Home Atorvastatin 80 mg    Lipid panel 12/16/19: T cholesterol 244, HDL 46, , Triglyceride 135    PLAN:  -- Continue home statin  -- Lipid panel ordered with morning labs  -- goal LDL <70        Non-intractable vomiting with nausea  Patient having two days of non-bloody non-bilious emesis with poor P.O. Intake    PLAN:  -- IV 4mg Zofran Q4H PRN   -- P.O. Phenergan 25 mg PRN  -- EKG daily to assess for QTc. 1/2/20 EKG QTc - 453      Essential hypertension  Home: Lisinopril 40 mg QD, Coreg 12.5 mg BID    PLAN:  -- Continue home medicine  -- Goal BP <140/90      VTE Risk Mitigation (From admission, onward)         Ordered     Place sequential compression device  Until discontinued      01/02/20 1518     IP VTE HIGH RISK PATIENT  Once      01/02/20 1518     heparin 25,000 units in dextrose 5% 250 mL (100 units/mL) infusion LOW INTENSITY nomogram - [facility]  Continuous     Question:  Heparin Infusion Adjustment (DO NOT MODIFY ANSWER)  Answer:  \\[email or web address]\Images\Pharmacy\HeparinInfusions\heparin LOW INTENSITY nomogram for [facility] [id number]    01/02/20 1254     heparin 25,000 units in dextrose 5% (100 units/ml) IV bolus from bag - ADDITIONAL PRN BOLUS - 60 units/kg (max bolus 4000 units)  As needed (PRN)     Question:  Heparin Infusion Adjustment (DO NOT MODIFY ANSWER)  Answer:  \\[facility] [employer]\Images\Pharmacy\HeparinInfusions\heparin LOW INTENSITY nomogram for [facility] [id number].pdf    01/02/20  1254     heparin 25,000 units in dextrose 5% (100 units/ml) IV bolus from bag - ADDITIONAL PRN BOLUS - 30 units/kg (max bolus 4000 units)  As needed (PRN)     Question:  Heparin Infusion Adjustment (DO NOT MODIFY ANSWER)  Answer:  [facility]\epic\Images\Pharmacy\HeparinInfusions\heparin LOW INTENSITY nomogram for [facility] [id number]    01/02/20 1254                Batsheva Torres DO  Department of Hospital Medicine   [facility]

## 2020-01-02 NOTE — SUBJECTIVE & OBJECTIVE
Past Medical History:   Diagnosis Date    Anxiety     Depression     Diabetes mellitus     type 2    GERD (gastroesophageal reflux disease)     Hyperlipemia     Hypertension     Myocardial infarction 2010    minor-caused by stress per pt.       Past Surgical History:   Procedure Laterality Date    Angiogram - Right Extremity Right 7/9/15    angiogram-left leg  10/6/15    CATHETERIZATION OF BOTH LEFT AND RIGHT HEART N/A 12/18/2019    Procedure: CATHETERIZATION, HEART, BOTH LEFT AND RIGHT;  Surgeon: Que Fernando III, MD;  Location: [facility] CATH LAB;  Service: Cardiology;  Laterality: N/A;    CORONARY ANGIOGRAPHY N/A 12/18/2019    Procedure: ANGIOGRAM, CORONARY ARTERY;  Surgeon: Que Fernando III, MD;  Location: [facility] CATH LAB;  Service: Cardiology;  Laterality: N/A;       Review of patient's allergies indicates:   Allergen Reactions    Pcn [penicillins]      rash       No current facility-administered medications on file prior to encounter.      Current Outpatient Medications on File Prior to Encounter   Medication Sig    aspirin 81 MG Chew Take 1 tablet (81 mg total) by mouth once daily.    atorvastatin (LIPITOR) 80 MG tablet Take 1 tablet (80 mg total) by mouth once daily.    blood sugar diagnostic (ONETOUCH ULTRA BLUE TEST STRIP) Strp Use to test blood glucose twice daily    blood sugar diagnostic Strp test  blood glucose 2 (two) times daily with meals.    blood-glucose meter Misc use as directed    blood-glucose meter Misc Use to test blood glucose    carvedilol (COREG) 12.5 MG tablet Take 1 tablet (12.5 mg total) by mouth 2 (two) times daily with meals.    escitalopram oxalate (LEXAPRO) 10 MG tablet Take 1 tablet (10 mg total) by mouth once daily.    famotidine (PEPCID) 20 MG tablet Take 1 tablet (20 mg total) by mouth 2 (two) times daily.    insulin aspart U-100 (NOVOLOG) 100 unit/mL injection Inject 9 Units into the skin 3 (three) times daily with meals.    insulin glargine 100  "units/mL (3mL) SubQ pen Inject 50 Units into the skin every evening.    lancets 30 gauge Misc use to test blood glucose 2 (two) times daily with meals.    lisinopril (PRINIVIL,ZESTRIL) 40 MG tablet Take 1 tablet (40 mg total) by mouth once daily.    ondansetron (ZOFRAN-ODT) 8 MG TbDL Dissolve 1 tablet (8 mg total) by mouth every 8 (eight) hours as needed.    pen needle, diabetic 31 gauge x 5/16" Ndle Use to inject insulin 2 (two) times daily with meals.    pen needle, diabetic 32 gauge x 5/32" Ndle 1 each by Misc.(Non-Drug; Combo Route) route 2 (two) times daily with meals.    torsemide (DEMADEX) 20 MG Tab Take 2 tablets (40 mg total) by mouth once daily.    gabapentin (NEURONTIN) 300 MG capsule Take 1 capsule (300 mg total) by mouth 3 (three) times daily.    hydrOXYzine pamoate (VISTARIL) 50 MG Cap Take 1 capsule (50 mg total) by mouth every 6 (six) hours as needed (anxiety).    [DISCONTINUED] lancing device Misc 1 Device by Misc.(Non-Drug; Combo Route) route 2 (two) times daily with meals.     Family History     None        Tobacco Use    Smoking status: Never Smoker    Smokeless tobacco: Never Used   Substance and Sexual Activity    Alcohol use: No    Drug use: Yes     Types: Marijuana     Comment: used yesterday    Sexual activity: Not on file     Review of Systems   Constitutional: Positive for activity change, appetite change, chills, diaphoresis, fatigue and unexpected weight change. Negative for fever.   HENT: Negative for sinus pressure, sore throat and trouble swallowing.    Eyes: Negative for photophobia and visual disturbance.   Respiratory: Positive for shortness of breath. Negative for cough, choking, chest tightness and wheezing.    Cardiovascular: Positive for palpitations. Negative for chest pain and leg swelling.   Gastrointestinal: Positive for abdominal pain (diffuse (but mainly in epigastrium)), diarrhea, nausea and vomiting. Negative for abdominal distention, blood in stool and " constipation.   Endocrine: Negative for polyphagia and polyuria.   Genitourinary: Negative for difficulty urinating, dysuria and flank pain.   Musculoskeletal: Negative for back pain and gait problem.   Skin: Negative for color change and pallor.   Neurological: Positive for weakness and headaches. Negative for tremors, syncope and light-headedness.   Psychiatric/Behavioral: Negative for agitation and confusion.     Objective:     Vital Signs (Most Recent):  Temp: 98.3 °F (36.8 °C) (01/02/20 1031)  Pulse: 104 (01/02/20 1426)  Resp: (!) 22 (01/02/20 1031)  BP: (!) 144/75 (01/02/20 1339)  SpO2: 97 % (01/02/20 1433) Vital Signs (24h Range):  Temp:  [98.3 °F (36.8 °C)] 98.3 °F (36.8 °C)  Pulse:  [100-112] 104  Resp:  [22] 22  SpO2:  [95 %-100 %] 97 %  BP: (141-160)/() 144/75     Weight: 98 kg (216 lb)  Body mass index is 36.5 kg/m².    Physical Exam   Constitutional: She is oriented to person, place, and time. She appears well-developed and well-nourished. She has a sickly appearance. She appears ill. She appears distressed.   HENT:   Head: Normocephalic and atraumatic.   Eyes: Conjunctivae and EOM are normal.   Neck: Normal range of motion. Neck supple. JVD present.   Cardiovascular: Regular rhythm and intact distal pulses. Tachycardia present.   Pulmonary/Chest: Effort normal. No respiratory distress. She has rales. She exhibits no tenderness.   Abdominal: Soft. Bowel sounds are normal. She exhibits no distension. There is tenderness (diffuse TTP). There is no rebound and no guarding.   Musculoskeletal: Normal range of motion. She exhibits no edema or tenderness.   Neurological: She is alert and oriented to person, place, and time. No sensory deficit.   Skin: Skin is warm and dry.   Psychiatric: She has a normal mood and affect. Her behavior is normal. Judgment and thought content normal.         CRANIAL NERVES     CN III, IV, VI   Extraocular motions are normal.        Significant Labs:   A1C:   Recent Labs    Lab 12/07/19  0132   HGBA1C 10.3*     CBC:   Recent Labs   Lab 01/02/20  1129 01/02/20  1133 01/02/20  1305   WBC 13.61*  --  12.11   HGB 13.4  --  12.5   HCT 41.8 44 39.7   *  --  340     CMP:   Recent Labs   Lab 01/02/20  1305   *   K 3.4*   CL 97   CO2 25   *   BUN 26*   CREATININE 1.2   CALCIUM 9.0   PROT 7.4   ALBUMIN 2.5*   BILITOT 0.8   ALKPHOS 98   AST 10   ALT 9*   ANIONGAP 11   EGFRNONAA 51.7*     Cardiac Markers:   Recent Labs   Lab 01/02/20  1129   BNP 1,013*     Coagulation:   Recent Labs   Lab 01/02/20  1129   INR 1.1   APTT 24.1     Lipase:   Recent Labs   Lab 01/02/20  1305   LIPASE 8     Troponin:   Recent Labs   Lab 01/02/20  1129   TROPONINI 0.176*     Significant Imaging: I have reviewed and interpreted all pertinent imaging results/findings within the past 24 hours.

## 2020-01-02 NOTE — CONSULTS
[facility]  Cardiology  Consult Note    Patient Name: Suyapa Connelly  MRN: [id number]  Admission Date: 1/2/2020  Hospital Length of Stay: 0 days  Code Status: Full Code   Attending Provider: Lucas Bueno MD   Consulting Provider: Laura Walton MD  Primary Care Physician: Erlinda Rahman NP  Principal Problem:<principal problem not specified>    Patient information was obtained from patient and ER records.     Inpatient consult to Cardiology  Consult performed by: Laura Walton MD  Consult ordered by: Onur Suarez MD        Subjective:     Chief Complaint:  NSTEMi     HPI:   53 y/o F cardiology consulted for elevated troponin.      Since yesterday she has been having worsening dyspnea on exertion and has nausea and vomitting x12 since yesterday. She denies angina. Her CXR shows pulmonary edema BNP 1000 from 230, 3 weeks ago.  She has known 2 vessel disease in LAD and large OM1, peripheral vascular disease as well and poorly controlled DM, and previous MI in 2012 (no intervention). She was supposed to see CTS today as an outpatient for CABG evaluation.     EKG right superior axis normal axis and normal intervals.  Echo EF 35%      Past Medical History:   Diagnosis Date    Anxiety     Depression     Diabetes mellitus     type 2    GERD (gastroesophageal reflux disease)     Hyperlipemia     Hypertension     Myocardial infarction 2010    minor-caused by stress per pt.       Past Surgical History:   Procedure Laterality Date    Angiogram - Right Extremity Right 7/9/15    angiogram-left leg  10/6/15    CATHETERIZATION OF BOTH LEFT AND RIGHT HEART N/A 12/18/2019    Procedure: CATHETERIZATION, HEART, BOTH LEFT AND RIGHT;  Surgeon: Que Fernando III, MD;  Location: [facility] CATH LAB;  Service: Cardiology;  Laterality: N/A;    CORONARY ANGIOGRAPHY N/A 12/18/2019    Procedure: ANGIOGRAM, CORONARY ARTERY;  Surgeon: Que Fernando III, MD;  Location: [facility] CATH LAB;   "Service: Cardiology;  Laterality: N/A;       Review of patient's allergies indicates:   Allergen Reactions    Pcn [penicillins]      rash       No current facility-administered medications on file prior to encounter.      Current Outpatient Medications on File Prior to Encounter   Medication Sig    aspirin 81 MG Chew Take 1 tablet (81 mg total) by mouth once daily.    atorvastatin (LIPITOR) 80 MG tablet Take 1 tablet (80 mg total) by mouth once daily.    blood sugar diagnostic (ONETOUCH ULTRA BLUE TEST STRIP) Strp Use to test blood glucose twice daily    blood sugar diagnostic Strp test  blood glucose 2 (two) times daily with meals.    blood-glucose meter Misc use as directed    blood-glucose meter Misc Use to test blood glucose    carvedilol (COREG) 12.5 MG tablet Take 1 tablet (12.5 mg total) by mouth 2 (two) times daily with meals.    escitalopram oxalate (LEXAPRO) 10 MG tablet Take 1 tablet (10 mg total) by mouth once daily.    famotidine (PEPCID) 20 MG tablet Take 1 tablet (20 mg total) by mouth 2 (two) times daily.    gabapentin (NEURONTIN) 300 MG capsule Take 1 capsule (300 mg total) by mouth 3 (three) times daily.    hydrOXYzine pamoate (VISTARIL) 50 MG Cap Take 1 capsule (50 mg total) by mouth every 6 (six) hours as needed (anxiety).    insulin aspart U-100 (NOVOLOG) 100 unit/mL injection Inject 9 Units into the skin 3 (three) times daily with meals.    insulin glargine 100 units/mL (3mL) SubQ pen Inject 50 Units into the skin every evening.    lancets 30 gauge Misc use to test blood glucose 2 (two) times daily with meals.    lisinopril (PRINIVIL,ZESTRIL) 40 MG tablet Take 1 tablet (40 mg total) by mouth once daily.    ondansetron (ZOFRAN-ODT) 8 MG TbDL Dissolve 1 tablet (8 mg total) by mouth every 8 (eight) hours as needed.    pen needle, diabetic 31 gauge x 5/16" Ndle Use to inject insulin 2 (two) times daily with meals.    pen needle, diabetic 32 gauge x 5/32" Ndle 1 each by " Misc.(Non-Drug; Combo Route) route 2 (two) times daily with meals.    torsemide (DEMADEX) 20 MG Tab Take 2 tablets (40 mg total) by mouth once daily.    [DISCONTINUED] lancing device Misc 1 Device by Misc.(Non-Drug; Combo Route) route 2 (two) times daily with meals.     Family History     None        Tobacco Use    Smoking status: Never Smoker    Smokeless tobacco: Never Used   Substance and Sexual Activity    Alcohol use: No    Drug use: Yes     Types: Marijuana     Comment: used yesterday    Sexual activity: Not on file     Review of Systems   All other systems reviewed and are negative.    Objective:     Vital Signs (Most Recent):  Temp: 98.3 °F (36.8 °C) (01/02/20 1031)  Pulse: 104 (01/02/20 1426)  Resp: (!) 22 (01/02/20 1031)  BP: (!) 144/75 (01/02/20 1339)  SpO2: 97 % (01/02/20 1433) Vital Signs (24h Range):  Temp:  [98.3 °F (36.8 °C)] 98.3 °F (36.8 °C)  Pulse:  [100-112] 104  Resp:  [22] 22  SpO2:  [95 %-100 %] 97 %  BP: (141-160)/() 144/75     Weight: 98 kg (216 lb)  Body mass index is 36.5 kg/m².    SpO2: 97 %  O2 Device (Oxygen Therapy): room air    No intake or output data in the 24 hours ending 01/02/20 1517    Lines/Drains/Airways     None                 Physical Exam   Constitutional: She is oriented to person, place, and time. She appears well-developed and well-nourished.   HENT:   Head: Normocephalic and atraumatic.   Eyes: No scleral icterus.   Cardiovascular: Regular rhythm and normal heart sounds.   Tachycardic    Pulmonary/Chest: Effort normal.   Decreased breath sounds b/l bases   Musculoskeletal: She exhibits no edema.   Neurological: She is alert and oriented to person, place, and time.   Skin: Skin is warm.   Psychiatric: She has a normal mood and affect.       Significant Labs: All pertinent lab results from the last 24 hours have been reviewed.    Significant Imaging: EKG: hpi    Assessment and Plan:     Acute HF (heart failure)  Echo   C/w GDMT HF therapy   Lasix IV BID      NSTEMI (non-ST elevated myocardial infarction)  She was acute HF, rising BNP, edema on CXR, as well as N/v and elevated troponins. My suspicion is this is primarily HF and troponins are secondary, however given her risk factors of PAD, coronary disease and uncontrolled DM she may have atypical presentation for ACS/silent ischemia, her risk for  ACS is high therefore will treat HF and ACS.     Admit to medicine   Heparin gtt ACS protocol   ASA and Plavix load   Lasix 80mg IV BID   Replete K (already ordered)  Recheck BMP 8pm   Echo   Consult CTS   Famotidine IV BID   High intensity statin     Optimize glycemic control   LDL goal <70          VTE Risk Mitigation (From admission, onward)         Ordered     Place sequential compression device  Until discontinued      01/02/20 1518     IP VTE HIGH RISK PATIENT  Once      01/02/20 1518     heparin 25,000 units in dextrose 5% 250 mL (100 units/mL) infusion LOW INTENSITY nomogram - [facility]  Continuous     Question:  Heparin Infusion Adjustment (DO NOT MODIFY ANSWER)  Answer:  \\[email or web address]\epic\Images\Pharmacy\HeparinInfusions\heparin LOW INTENSITY nomogram for [facility] [id number]    01/02/20 1254     heparin 25,000 units in dextrose 5% (100 units/ml) IV bolus from bag - ADDITIONAL PRN BOLUS - 60 units/kg (max bolus 4000 units)  As needed (PRN)     Question:  Heparin Infusion Adjustment (DO NOT MODIFY ANSWER)  Answer:  \\[email or web address]\epic\Images\Pharmacy\HeparinInfusions\heparin LOW INTENSITY nomogram for [facility] [id number].pdf    01/02/20 1254     heparin 25,000 units in dextrose 5% (100 units/ml) IV bolus from bag - ADDITIONAL PRN BOLUS - 30 units/kg (max bolus 4000 units)  As needed (PRN)     Question:  Heparin Infusion Adjustment (DO NOT MODIFY ANSWER)  Answer:  \\[email or web address]\epic\Images\Pharmacy\HeparinInfusions\heparin LOW INTENSITY nomogram for [facility] [id number]    01/02/20 1254                Thank you for your consult. I will follow-up with patient. Please contact us if you have  any additional questions.    Laura Walton MD  Cardiology   [facility]

## 2020-01-02 NOTE — ASSESSMENT & PLAN NOTE
She was acute HF, rising BNP, edema on CXR, as well as N/v and elevated troponins. My suspicion is this is primarily HF and troponins are secondary, however given her risk factors of PAD, coronary disease and uncontrolled DM, her risk for primary ACS is high therefore will treat HF and ACS.     Admit to medicine   Heparin gtt ACS protocol   ASA and Plavix load   Lasix 80mg IV BID   Replete K (already ordered)  Recheck BMP 8pm   Echo   Consult CTS   Famotidine IV BID   High intensity statin

## 2020-01-02 NOTE — HPI
53 y/o F cardiology consulted for elevated troponin.      Since yesterday she has been having worsening dyspnea on exertion and has nausea and vomitting x12 since yesterday. She denies angina. Her CXR shows pulmonary edema BNP 1000 from 230, 3 weeks ago.  She has known 2 vessel disease in LAD and large OM1, peripheral vascular disease as well and poorly controlled DM, and previous MI in 2012 (no intervention). She was supposed to see CTS today as an outpatient for CABG evaluation.     EKG right superior axis normal axis and normal intervals.  Echo EF 35%

## 2020-01-02 NOTE — Clinical Note
CATH GLIDEPATH 14.5F 23CM 28CM INSERTED INTO THE LEFT INTERNAL JUGULAR VEIN AND REPOSITIONED TO THE HIGH RIGHT ATRIUM. CATHETER IS SECURED IN PLACE IN THE RIGHT ATRIUM AND SUTURED INTO THE LEFT NECK.

## 2020-01-02 NOTE — ASSESSMENT & PLAN NOTE
Home Atorvastatin 80 mg    Lipid panel 12/16/19: T cholesterol 244, HDL 46, , Triglyceride 135    PLAN:  -- Continue home statin  -- Lipid panel ordered with morning labs  -- goal LDL <70

## 2020-01-03 ENCOUNTER — CLINICAL SUPPORT (OUTPATIENT)
Dept: CARDIOLOGY | Facility: CLINIC | Age: 55
DRG: 235 | End: 2020-01-03
Attending: EMERGENCY MEDICINE
Payer: MEDICAID

## 2020-01-03 PROBLEM — E11.65 TYPE 2 DIABETES MELLITUS WITH HYPERGLYCEMIA, WITH LONG-TERM CURRENT USE OF INSULIN: Status: ACTIVE | Noted: 2020-01-02

## 2020-01-03 PROBLEM — Z79.4 TYPE 2 DIABETES MELLITUS WITH HYPERGLYCEMIA, WITH LONG-TERM CURRENT USE OF INSULIN: Status: ACTIVE | Noted: 2020-01-02

## 2020-01-03 LAB
ADENOVIRUS: NOT DETECTED
ALBUMIN SERPL BCP-MCNC: 2.4 G/DL (ref 3.5–5.2)
ALP SERPL-CCNC: 98 U/L (ref 55–135)
ALT SERPL W/O P-5'-P-CCNC: 5 U/L (ref 10–44)
ANION GAP SERPL CALC-SCNC: 11 MMOL/L (ref 8–16)
APTT BLDCRRT: 24.8 SEC (ref 21–32)
APTT BLDCRRT: 36.1 SEC (ref 21–32)
APTT BLDCRRT: 51.7 SEC (ref 21–32)
ASCENDING AORTA: 2.75 CM
AST SERPL-CCNC: 10 U/L (ref 10–40)
AV INDEX (PROSTH): 0.59
AV MEAN GRADIENT: 4 MMHG
AV PEAK GRADIENT: 6 MMHG
AV VALVE AREA: 1.98 CM2
AV VELOCITY RATIO: 0.54
BASOPHILS # BLD AUTO: 0.03 K/UL (ref 0–0.2)
BASOPHILS # BLD AUTO: 0.03 K/UL (ref 0–0.2)
BASOPHILS NFR BLD: 0.3 % (ref 0–1.9)
BASOPHILS NFR BLD: 0.3 % (ref 0–1.9)
BILIRUB SERPL-MCNC: 0.6 MG/DL (ref 0.1–1)
BORDETELLA PARAPERTUSSIS (IS1001): NOT DETECTED
BORDETELLA PERTUSSIS (PTXP): NOT DETECTED
BSA FOR ECHO PROCEDURE: 2.01 M2
BUN SERPL-MCNC: 31 MG/DL (ref 6–20)
CALCIUM SERPL-MCNC: 9.2 MG/DL (ref 8.7–10.5)
CHLAMYDIA PNEUMONIAE: NOT DETECTED
CHLORIDE SERPL-SCNC: 99 MMOL/L (ref 95–110)
CHOLEST SERPL-MCNC: 246 MG/DL (ref 120–199)
CHOLEST/HDLC SERPL: 6 {RATIO} (ref 2–5)
CO2 SERPL-SCNC: 24 MMOL/L (ref 23–29)
CORONAVIRUS 229E, COMMON COLD VIRUS: NOT DETECTED
CORONAVIRUS HKU1, COMMON COLD VIRUS: NOT DETECTED
CORONAVIRUS NL63, COMMON COLD VIRUS: NOT DETECTED
CORONAVIRUS OC43, COMMON COLD VIRUS: NOT DETECTED
CREAT SERPL-MCNC: 1.2 MG/DL (ref 0.5–1.4)
CV ECHO LV RWT: 0.28 CM
DIFFERENTIAL METHOD: ABNORMAL
DIFFERENTIAL METHOD: ABNORMAL
DOP CALC AO PEAK VEL: 1.18 M/S
DOP CALC AO VTI: 22.7 CM
DOP CALC LVOT AREA: 3.4 CM2
DOP CALC LVOT DIAMETER: 2.07 CM
DOP CALC LVOT PEAK VEL: 0.64 M/S
DOP CALC LVOT STROKE VOLUME: 44.84 CM3
DOP CALCLVOT PEAK VEL VTI: 13.33 CM
E WAVE DECELERATION TIME: 155.17 MSEC
E/A RATIO: 0.82
E/E' RATIO: 15.4 M/S
ECHO LV POSTERIOR WALL: 0.81 CM (ref 0.6–1.1)
EOSINOPHIL # BLD AUTO: 0 K/UL (ref 0–0.5)
EOSINOPHIL # BLD AUTO: 0 K/UL (ref 0–0.5)
EOSINOPHIL NFR BLD: 0 % (ref 0–8)
EOSINOPHIL NFR BLD: 0 % (ref 0–8)
ERYTHROCYTE [DISTWIDTH] IN BLOOD BY AUTOMATED COUNT: 12.5 % (ref 11.5–14.5)
ERYTHROCYTE [DISTWIDTH] IN BLOOD BY AUTOMATED COUNT: 12.5 % (ref 11.5–14.5)
EST. GFR  (AFRICAN AMERICAN): 59.6 ML/MIN/1.73 M^2
EST. GFR  (NON AFRICAN AMERICAN): 51.7 ML/MIN/1.73 M^2
ESTIMATED AVG GLUCOSE: 232 MG/DL (ref 68–131)
FLUBV RNA NPH QL NAA+NON-PROBE: NOT DETECTED
FRACTIONAL SHORTENING: 20 % (ref 28–44)
GLUCOSE SERPL-MCNC: 195 MG/DL (ref 70–110)
HBA1C MFR BLD HPLC: 9.7 % (ref 4–5.6)
HCT VFR BLD AUTO: 39.7 % (ref 37–48.5)
HCT VFR BLD AUTO: 39.7 % (ref 37–48.5)
HDLC SERPL-MCNC: 41 MG/DL (ref 40–75)
HDLC SERPL: 16.7 % (ref 20–50)
HGB BLD-MCNC: 12.2 G/DL (ref 12–16)
HGB BLD-MCNC: 12.2 G/DL (ref 12–16)
HPIV1 RNA NPH QL NAA+NON-PROBE: NOT DETECTED
HPIV2 RNA NPH QL NAA+NON-PROBE: NOT DETECTED
HPIV3 RNA NPH QL NAA+NON-PROBE: NOT DETECTED
HPIV4 RNA NPH QL NAA+NON-PROBE: NOT DETECTED
HUMAN METAPNEUMOVIRUS: NOT DETECTED
IMM GRANULOCYTES # BLD AUTO: 0.02 K/UL (ref 0–0.04)
IMM GRANULOCYTES # BLD AUTO: 0.02 K/UL (ref 0–0.04)
IMM GRANULOCYTES NFR BLD AUTO: 0.2 % (ref 0–0.5)
IMM GRANULOCYTES NFR BLD AUTO: 0.2 % (ref 0–0.5)
INFLUENZA A (SUBTYPES H1,H1-2009,H3): NOT DETECTED
INTERVENTRICULAR SEPTUM: 0.89 CM (ref 0.6–1.1)
IVRT: 0.14 MSEC
LA MAJOR: 5.46 CM
LA MINOR: 5.09 CM
LA WIDTH: 5.52 CM
LDLC SERPL CALC-MCNC: 182 MG/DL (ref 63–159)
LEFT ATRIUM SIZE: 4.3 CM
LEFT ATRIUM VOLUME INDEX MOD: 46.5 ML/M2
LEFT ATRIUM VOLUME INDEX: 54.6 ML/M2
LEFT ATRIUM VOLUME: 106.3 CM3
LEFT CBA DIAS: 10 CM/S
LEFT CBA SYS: 63 CM/S
LEFT CCA DIST DIAS: 13 CM/S
LEFT CCA DIST SYS: 102 CM/S
LEFT CCA MID DIAS: 17 CM/S
LEFT CCA MID SYS: 119 CM/S
LEFT CCA PROX DIAS: 10 CM/S
LEFT CCA PROX SYS: 77 CM/S
LEFT ECA DIAS: 9 CM/S
LEFT ECA SYS: 102 CM/S
LEFT ICA DIST DIAS: 19 CM/S
LEFT ICA DIST SYS: 63 CM/S
LEFT ICA MID DIAS: 17 CM/S
LEFT ICA MID SYS: 57 CM/S
LEFT ICA PROX DIAS: 10 CM/S
LEFT ICA PROX SYS: 73 CM/S
LEFT INTERNAL DIMENSION IN SYSTOLE: 4.58 CM (ref 2.1–4)
LEFT VENTRICLE DIASTOLIC VOLUME INDEX: 82.37 ML/M2
LEFT VENTRICLE DIASTOLIC VOLUME: 160.43 ML
LEFT VENTRICLE MASS INDEX: 95 G/M2
LEFT VENTRICLE SYSTOLIC VOLUME INDEX: 49.4 ML/M2
LEFT VENTRICLE SYSTOLIC VOLUME: 96.28 ML
LEFT VENTRICULAR INTERNAL DIMENSION IN DIASTOLE: 5.71 CM (ref 3.5–6)
LEFT VENTRICULAR MASS: 184.22 G
LEFT VERTEBRAL DIAS: 12 CM/S
LEFT VERTEBRAL SYS: 49 CM/S
LV LATERAL E/E' RATIO: 15.4 M/S
LV SEPTAL E/E' RATIO: 15.4 M/S
LYMPHOCYTES # BLD AUTO: 1.5 K/UL (ref 1–4.8)
LYMPHOCYTES # BLD AUTO: 1.5 K/UL (ref 1–4.8)
LYMPHOCYTES NFR BLD: 15 % (ref 18–48)
LYMPHOCYTES NFR BLD: 15 % (ref 18–48)
MAGNESIUM SERPL-MCNC: 2 MG/DL (ref 1.6–2.6)
MCH RBC QN AUTO: 30 PG (ref 27–31)
MCH RBC QN AUTO: 30 PG (ref 27–31)
MCHC RBC AUTO-ENTMCNC: 30.7 G/DL (ref 32–36)
MCHC RBC AUTO-ENTMCNC: 30.7 G/DL (ref 32–36)
MCV RBC AUTO: 98 FL (ref 82–98)
MCV RBC AUTO: 98 FL (ref 82–98)
MONOCYTES # BLD AUTO: 0.8 K/UL (ref 0.3–1)
MONOCYTES # BLD AUTO: 0.8 K/UL (ref 0.3–1)
MONOCYTES NFR BLD: 7.9 % (ref 4–15)
MONOCYTES NFR BLD: 7.9 % (ref 4–15)
MV PEAK A VEL: 0.94 M/S
MV PEAK E VEL: 0.77 M/S
MYCOPLASMA PNEUMONIAE: NOT DETECTED
NEUTROPHILS # BLD AUTO: 7.8 K/UL (ref 1.8–7.7)
NEUTROPHILS # BLD AUTO: 7.8 K/UL (ref 1.8–7.7)
NEUTROPHILS NFR BLD: 76.6 % (ref 38–73)
NEUTROPHILS NFR BLD: 76.6 % (ref 38–73)
NONHDLC SERPL-MCNC: 205 MG/DL
NRBC BLD-RTO: 0 /100 WBC
NRBC BLD-RTO: 0 /100 WBC
OHS CV CAROTID RIGHT ICA EDV HIGHEST: 19
OHS CV CAROTID ULTRASOUND LEFT ICA/CCA RATIO: 0.61
OHS CV CAROTID ULTRASOUND RIGHT ICA/CCA RATIO: 1.59
OHS CV PV CAROTID LEFT HIGHEST CCA: 119
OHS CV PV CAROTID LEFT HIGHEST ICA: 73
OHS CV PV CAROTID RIGHT HIGHEST CCA: 58
OHS CV PV CAROTID RIGHT HIGHEST ICA: 92
OHS CV US CAROTID LEFT HIGHEST EDV: 19
PHOSPHATE SERPL-MCNC: 2.5 MG/DL (ref 2.7–4.5)
PISA TR MAX VEL: 3.07 M/S
PLATELET # BLD AUTO: 326 K/UL (ref 150–350)
PLATELET # BLD AUTO: 326 K/UL (ref 150–350)
PMV BLD AUTO: 10.6 FL (ref 9.2–12.9)
PMV BLD AUTO: 10.6 FL (ref 9.2–12.9)
POCT GLUCOSE: 119 MG/DL (ref 70–110)
POCT GLUCOSE: 153 MG/DL (ref 70–110)
POCT GLUCOSE: 154 MG/DL (ref 70–110)
POCT GLUCOSE: 197 MG/DL (ref 70–110)
POCT GLUCOSE: 57 MG/DL (ref 70–110)
POTASSIUM SERPL-SCNC: 3.8 MMOL/L (ref 3.5–5.1)
PROT SERPL-MCNC: 7.5 G/DL (ref 6–8.4)
PULM VEIN S/D RATIO: 0.83
PV PEAK D VEL: 0.47 M/S
PV PEAK S VEL: 0.39 M/S
RA MAJOR: 4.75 CM
RA PRESSURE: 8 MMHG
RA WIDTH: 3.77 CM
RBC # BLD AUTO: 4.06 M/UL (ref 4–5.4)
RBC # BLD AUTO: 4.06 M/UL (ref 4–5.4)
RESPIRATORY INFECTION PANEL SOURCE: NORMAL
RETIRED EF AND QEF - SEE NOTES: 34 %
RIGHT ARM DIASTOLIC BLOOD PRESSURE: 89 MMHG
RIGHT ARM SYSTOLIC BLOOD PRESSURE: 158 MMHG
RIGHT CBA DIAS: 10 CM/S
RIGHT CBA SYS: 49 CM/S
RIGHT CCA DIST DIAS: 8 CM/S
RIGHT CCA DIST SYS: 46 CM/S
RIGHT CCA MID DIAS: 9 CM/S
RIGHT CCA MID SYS: 58 CM/S
RIGHT CCA PROX DIAS: 8 CM/S
RIGHT CCA PROX SYS: 49 CM/S
RIGHT ECA DIAS: 14 CM/S
RIGHT ECA SYS: 106 CM/S
RIGHT ICA DIST DIAS: 16 CM/S
RIGHT ICA DIST SYS: 71 CM/S
RIGHT ICA MID DIAS: 15 CM/S
RIGHT ICA MID SYS: 91 CM/S
RIGHT ICA PROX DIAS: 19 CM/S
RIGHT ICA PROX SYS: 92 CM/S
RIGHT VENTRICULAR END-DIASTOLIC DIMENSION: 4.14 CM
RIGHT VERTEBRAL DIAS: 12 CM/S
RIGHT VERTEBRAL SYS: 46 CM/S
RSV RNA NPH QL NAA+NON-PROBE: NOT DETECTED
RV TISSUE DOPPLER FREE WALL SYSTOLIC VELOCITY 1 (APICAL 4 CHAMBER VIEW): 14.07 CM/S
RV+EV RNA NPH QL NAA+NON-PROBE: NOT DETECTED
SINUS: 2.59 CM
SODIUM SERPL-SCNC: 134 MMOL/L (ref 136–145)
STJ: 2.48 CM
TDI LATERAL: 0.05 M/S
TDI SEPTAL: 0.05 M/S
TDI: 0.05 M/S
TR MAX PG: 38 MMHG
TRICUSPID ANNULAR PLANE SYSTOLIC EXCURSION: 1.78 CM
TRIGL SERPL-MCNC: 115 MG/DL (ref 30–150)
TROPONIN I SERPL DL<=0.01 NG/ML-MCNC: 0.14 NG/ML (ref 0–0.03)
TROPONIN I SERPL DL<=0.01 NG/ML-MCNC: 0.17 NG/ML (ref 0–0.03)
TROPONIN I SERPL DL<=0.01 NG/ML-MCNC: 0.18 NG/ML (ref 0–0.03)
TROPONIN I SERPL DL<=0.01 NG/ML-MCNC: 0.18 NG/ML (ref 0–0.03)
TV REST PULMONARY ARTERY PRESSURE: 46 MMHG
WBC # BLD AUTO: 10.24 K/UL (ref 3.9–12.7)
WBC # BLD AUTO: 10.24 K/UL (ref 3.9–12.7)

## 2020-01-03 PROCEDURE — 80053 COMPREHEN METABOLIC PANEL: CPT

## 2020-01-03 PROCEDURE — 97161 PT EVAL LOW COMPLEX 20 MIN: CPT

## 2020-01-03 PROCEDURE — 63600175 PHARM REV CODE 636 W HCPCS: Performed by: STUDENT IN AN ORGANIZED HEALTH CARE EDUCATION/TRAINING PROGRAM

## 2020-01-03 PROCEDURE — 93880 EXTRACRANIAL BILAT STUDY: CPT | Mod: 26,,, | Performed by: INTERNAL MEDICINE

## 2020-01-03 PROCEDURE — 63600175 PHARM REV CODE 636 W HCPCS: Performed by: EMERGENCY MEDICINE

## 2020-01-03 PROCEDURE — 85730 THROMBOPLASTIN TIME PARTIAL: CPT

## 2020-01-03 PROCEDURE — 99232 PR SUBSEQUENT HOSPITAL CARE,LEVL II: ICD-10-PCS | Mod: ,,, | Performed by: INTERNAL MEDICINE

## 2020-01-03 PROCEDURE — 99232 SBSQ HOSP IP/OBS MODERATE 35: CPT | Mod: ,,, | Performed by: NURSE PRACTITIONER

## 2020-01-03 PROCEDURE — 99233 SBSQ HOSP IP/OBS HIGH 50: CPT | Mod: ,,, | Performed by: STUDENT IN AN ORGANIZED HEALTH CARE EDUCATION/TRAINING PROGRAM

## 2020-01-03 PROCEDURE — 83735 ASSAY OF MAGNESIUM: CPT

## 2020-01-03 PROCEDURE — 83036 HEMOGLOBIN GLYCOSYLATED A1C: CPT

## 2020-01-03 PROCEDURE — 99232 SBSQ HOSP IP/OBS MODERATE 35: CPT | Mod: ,,, | Performed by: INTERNAL MEDICINE

## 2020-01-03 PROCEDURE — 93010 ELECTROCARDIOGRAM REPORT: CPT | Mod: ,,, | Performed by: INTERNAL MEDICINE

## 2020-01-03 PROCEDURE — 93005 ELECTROCARDIOGRAM TRACING: CPT

## 2020-01-03 PROCEDURE — 25000003 PHARM REV CODE 250: Performed by: STUDENT IN AN ORGANIZED HEALTH CARE EDUCATION/TRAINING PROGRAM

## 2020-01-03 PROCEDURE — 99233 PR SUBSEQUENT HOSPITAL CARE,LEVL III: ICD-10-PCS | Mod: ,,, | Performed by: STUDENT IN AN ORGANIZED HEALTH CARE EDUCATION/TRAINING PROGRAM

## 2020-01-03 PROCEDURE — 93010 EKG 12-LEAD: ICD-10-PCS | Mod: ,,, | Performed by: INTERNAL MEDICINE

## 2020-01-03 PROCEDURE — 25500020 PHARM REV CODE 255: Performed by: STUDENT IN AN ORGANIZED HEALTH CARE EDUCATION/TRAINING PROGRAM

## 2020-01-03 PROCEDURE — 93880 EXTRACRANIAL BILAT STUDY: CPT

## 2020-01-03 PROCEDURE — 84484 ASSAY OF TROPONIN QUANT: CPT | Mod: 91

## 2020-01-03 PROCEDURE — 87633 RESP VIRUS 12-25 TARGETS: CPT

## 2020-01-03 PROCEDURE — 36415 COLL VENOUS BLD VENIPUNCTURE: CPT

## 2020-01-03 PROCEDURE — 93880 CV US DOPPLER CAROTID (CUPID ONLY): ICD-10-PCS | Mod: 26,,, | Performed by: INTERNAL MEDICINE

## 2020-01-03 PROCEDURE — 11000001 HC ACUTE MED/SURG PRIVATE ROOM

## 2020-01-03 PROCEDURE — 84100 ASSAY OF PHOSPHORUS: CPT

## 2020-01-03 PROCEDURE — 99232 PR SUBSEQUENT HOSPITAL CARE,LEVL II: ICD-10-PCS | Mod: ,,, | Performed by: NURSE PRACTITIONER

## 2020-01-03 PROCEDURE — 84484 ASSAY OF TROPONIN QUANT: CPT

## 2020-01-03 PROCEDURE — 80061 LIPID PANEL: CPT

## 2020-01-03 PROCEDURE — S0028 INJECTION, FAMOTIDINE, 20 MG: HCPCS | Performed by: STUDENT IN AN ORGANIZED HEALTH CARE EDUCATION/TRAINING PROGRAM

## 2020-01-03 PROCEDURE — 85730 THROMBOPLASTIN TIME PARTIAL: CPT | Mod: 91

## 2020-01-03 PROCEDURE — 85025 COMPLETE CBC W/AUTO DIFF WBC: CPT

## 2020-01-03 RX ORDER — MULTIVITAMIN
1 TABLET ORAL DAILY
Status: ON HOLD | COMMUNITY
End: 2021-06-08 | Stop reason: HOSPADM

## 2020-01-03 RX ORDER — INSULIN ASPART 100 [IU]/ML
10 INJECTION, SOLUTION INTRAVENOUS; SUBCUTANEOUS
Status: DISCONTINUED | OUTPATIENT
Start: 2020-01-04 | End: 2020-01-03

## 2020-01-03 RX ORDER — INSULIN ASPART 100 [IU]/ML
5 INJECTION, SOLUTION INTRAVENOUS; SUBCUTANEOUS
Status: DISCONTINUED | OUTPATIENT
Start: 2020-01-03 | End: 2020-01-03

## 2020-01-03 RX ORDER — ACETAMINOPHEN 500 MG
1000 TABLET ORAL DAILY PRN
Status: ON HOLD | COMMUNITY
End: 2021-06-08 | Stop reason: HOSPADM

## 2020-01-03 RX ORDER — INSULIN ASPART 100 [IU]/ML
5 INJECTION, SOLUTION INTRAVENOUS; SUBCUTANEOUS
Status: DISCONTINUED | OUTPATIENT
Start: 2020-01-04 | End: 2020-01-04

## 2020-01-03 RX ORDER — SODIUM,POTASSIUM PHOSPHATES 280-250MG
2 POWDER IN PACKET (EA) ORAL EVERY 4 HOURS
Status: DISPENSED | OUTPATIENT
Start: 2020-01-03 | End: 2020-01-03

## 2020-01-03 RX ADMIN — POTASSIUM CHLORIDE 10 MEQ: 10 INJECTION, SOLUTION INTRAVENOUS at 12:01

## 2020-01-03 RX ADMIN — HEPARIN SODIUM 16 UNITS/KG/HR: 10000 INJECTION, SOLUTION INTRAVENOUS at 01:01

## 2020-01-03 RX ADMIN — HEPARIN SODIUM 16 UNITS/KG/HR: 10000 INJECTION, SOLUTION INTRAVENOUS at 06:01

## 2020-01-03 RX ADMIN — IOHEXOL 125 ML: 350 INJECTION, SOLUTION INTRAVENOUS at 01:01

## 2020-01-03 RX ADMIN — ONDANSETRON 4 MG: 2 INJECTION INTRAMUSCULAR; INTRAVENOUS at 06:01

## 2020-01-03 RX ADMIN — DEXTROSE 125 ML: 10 SOLUTION INTRAVENOUS at 03:01

## 2020-01-03 RX ADMIN — GABAPENTIN 300 MG: 300 CAPSULE ORAL at 03:01

## 2020-01-03 RX ADMIN — GABAPENTIN 300 MG: 300 CAPSULE ORAL at 09:01

## 2020-01-03 RX ADMIN — POTASSIUM & SODIUM PHOSPHATES POWDER PACK 280-160-250 MG 2 PACKET: 280-160-250 PACK at 01:01

## 2020-01-03 RX ADMIN — HEPARIN SODIUM 19 UNITS/KG/HR: 10000 INJECTION, SOLUTION INTRAVENOUS at 03:01

## 2020-01-03 RX ADMIN — INSULIN ASPART 7 UNITS: 100 INJECTION, SOLUTION INTRAVENOUS; SUBCUTANEOUS at 01:01

## 2020-01-03 RX ADMIN — GABAPENTIN 300 MG: 300 CAPSULE ORAL at 01:01

## 2020-01-03 RX ADMIN — FAMOTIDINE 20 MG: 10 INJECTION, SOLUTION INTRAVENOUS at 09:01

## 2020-01-03 RX ADMIN — POTASSIUM CHLORIDE 10 MEQ: 10 INJECTION, SOLUTION INTRAVENOUS at 04:01

## 2020-01-03 RX ADMIN — ATORVASTATIN CALCIUM 80 MG: 20 TABLET, FILM COATED ORAL at 01:01

## 2020-01-03 RX ADMIN — ESCITALOPRAM OXALATE 10 MG: 10 TABLET ORAL at 01:01

## 2020-01-03 RX ADMIN — FAMOTIDINE 20 MG: 10 INJECTION, SOLUTION INTRAVENOUS at 01:01

## 2020-01-03 RX ADMIN — ASPIRIN 81 MG CHEWABLE TABLET 81 MG: 81 TABLET CHEWABLE at 01:01

## 2020-01-03 RX ADMIN — FUROSEMIDE 80 MG: 40 INJECTION, SOLUTION INTRAMUSCULAR; INTRAVENOUS at 05:01

## 2020-01-03 RX ADMIN — CARVEDILOL 12.5 MG: 12.5 TABLET, FILM COATED ORAL at 05:01

## 2020-01-03 RX ADMIN — LISINOPRIL 40 MG: 20 TABLET ORAL at 01:01

## 2020-01-03 RX ADMIN — FUROSEMIDE 80 MG: 40 INJECTION, SOLUTION INTRAMUSCULAR; INTRAVENOUS at 01:01

## 2020-01-03 NOTE — ASSESSMENT & PLAN NOTE
A1c 12/2019 - 10.3% 1/3/2020 - 9.7%    BG on admission was 347. Patient reports not taking her insulin for 2 days due to low P.O. Intake  Home regimen: Aspart 9 U WM + Lantus 50 U QHS    PLAN:  -- Started patient on 75% of home regimen and will titrate as needed. Aspart 7 U TIDWM and Detemir 40 U QHS  -- POC BG 4 X daily  -- LDSSI PRN  -- Goal -180

## 2020-01-03 NOTE — PLAN OF CARE
01/03/20 1020   Discharge Assessment   Assessment Type Discharge Planning Assessment   Confirmed/corrected address and phone number on facesheet? Yes   Assessment information obtained from? Patient   Expected Length of Stay (days) 3   Communicated expected length of stay with patient/caregiver yes   Prior to hospitilization cognitive status: Alert/Oriented   Prior to hospitalization functional status: Independent   Current cognitive status: Alert/Oriented   Current Functional Status: Independent   Lives With spouse   Able to Return to Prior Arrangements yes   Is patient able to care for self after discharge? Yes   Equipment Currently Used at Home none   Do you have any problems affording any of your prescribed medications? No   Is the patient taking medications as prescribed? yes   Does the patient have transportation home? Yes   Transportation Anticipated family or friend will provide   Discharge Plan A Home with family   Discharge Plan B Home with family   DME Needed Upon Discharge  none   Patient/Family in Agreement with Plan yes

## 2020-01-03 NOTE — PROGRESS NOTES
[facility] Hospital Medicine  Progress Note    Patient Name: Suyapa Connelly  MRN: [id number]  Patient Class: IP- Inpatient   Admission Date: 1/2/2020  Length of Stay: 1 days  Attending Physician: Jacinto Eugene MD  Primary Care Provider: Erlinda Rahman NP    [facility] Medicine Team: [facility] HOSP MED 2 Batsheva Torres DO    Subjective:     Principal Problem:Acute coronary syndrome    HPI:  Ms. Connelly is a 53 y/o AAF with known medical medical issues of chronic combined systolic and diastolic HF (EF 35% Grade II DD), CAD S/P MI 2010, Angiogram 12/18/19 mid LAD 90% stenosis, mid OM2 75% stenosis, and Ostial D1 70% stenosis LVDEP 25 mmHg, PAD S/P PTA with stents BLE 2015, IDDM (A1c 10.3%), HTN, HLD, GERD, Anxiety/ Depression. Patient present with a 2 day history of nausea and with multiple episodes of non-bilious non-bloody emesis and couple of episodes of watery non-bloody diarrhea. Patient reports initially feeling HARTMAN without associated chest pain/tightness for the past week before progressively worsening to the GI symptoms described. She reports having a 7 lb weight gain (dry weight unknown) and only able to walk less than 20-30 ft before feeling short of breath. She reports that 5-10 minute rest resolves her dyspnea and she never has shortness of breath at rest. She reports feeling similar symptoms to this when she was admitted to the hospital on 12/6/19 for ADHF. On Monday 12/30 she doubled her home torsemide dosage to help reduce the excess water weight but reports that it did not help much. When he GI symptoms developed on Tuesday, she reports being unable to tolerate anything P.O. and subsequently stopped taking all of her home medications including her home insulin. What prompted her to come into the hospital was a fluttering feeling in her chest associated with some diaphoresis. She reports not remembering the symptoms she felt when she was having her MI in 2010 but  at bedside  "reported that she had nausea and vomiting leading up to her MI and she collapsed and lost consciousness and then later once admitted to the hospital was told she had had a heart attack. Currently the patient reports feeling nauseated, diaphoretic, a mild headache, epigastric pain, with a vague "heart burn" like feeling in her chest. She denies light headedness, chest tightness, dyspnea, palpitations, dysuria, constipation, edema. She denies any recent sick contacts and fevers at home.    In the ED, , EKG showed sinus tachycardia without ST changes, 1st troponin 0.176, BNP 1,013, CXR - mild interstitial edema with pulmonary vascular congestion. She was seen by cardiology who suggested an ACS work up due to her recent angiogram showing vessel blockages and her being a diabetic with similar vague symptoms from last MI. The also suggested for CTS to be consulted to assess for CABG. Patient was admitted to hospital medicine Team 2 for further management and work up.      Overview/Hospital Course:  53 y/o AAF with known medical medical issues of chronic combined systolic and diastolic HF (EF 35% Grade II DD), CAD S/P MI 2010, Angiogram 12/18/19 mid LAD 90% stenosis, mid OM2 75% stenosis, and Ostial D1 70% stenosis LVDEP 25 mmHg, PAD S/P PTA with stents BLE 2015, IDDM (A1c 10.3%), HTN, HLD, GERD, Anxiety/ Depression. Worked up for NSTEMI/ACS. Patient tentatively planned for CABG depending on the CTA results and her repeat HbA1C, plan for surgery involving hybrid off pump coronary artery bypass (LIMA-LAD) with postoperative percutaneous coronary intervention to the LCx. Will hold Plavix in anticipation of surgery and will start heparin gtt after 48 hour per Cards recommendations    Interval History: NAEON. The patient denies any dyspnea and symptoms she was experiencing yesterday. Repeat EKG shows sinus rhythm with PVCs. Troponins have plateaued. CTS assessed the patient and tentively planning for CABG with PCI " Tuesday. Cards signed off.    Review of Systems   Constitutional: Positive for activity change, fatigue and unexpected weight change. Negative for appetite change, chills, diaphoresis and fever.   HENT: Negative for sinus pressure, sore throat and trouble swallowing.    Eyes: Negative for photophobia and visual disturbance.   Respiratory: Negative for cough, choking, chest tightness, shortness of breath and wheezing.    Cardiovascular: Negative for chest pain, palpitations and leg swelling.   Gastrointestinal: Positive for nausea. Negative for abdominal distention, abdominal pain (mild diffuse (but mainly in epigastrium)), blood in stool, constipation, diarrhea and vomiting.   Endocrine: Negative for polyphagia and polyuria.   Genitourinary: Negative for difficulty urinating, dysuria and flank pain.   Musculoskeletal: Negative for back pain and gait problem.   Skin: Negative for color change and pallor.   Neurological: Negative for tremors, syncope, weakness, light-headedness and headaches.   Psychiatric/Behavioral: Negative for agitation and confusion.     Objective:     Vital Signs (Most Recent):  Temp: 97.5 °F (36.4 °C) (01/03/20 1332)  Pulse: 98 (01/03/20 1332)  Resp: 18 (01/03/20 1332)  BP: (!) 158/79 (01/03/20 1332)  SpO2: (!) 93 % (01/03/20 1332) Vital Signs (24h Range):  Temp:  [97.5 °F (36.4 °C)-99 °F (37.2 °C)] 97.5 °F (36.4 °C)  Pulse:  [] 98  Resp:  [14-20] 18  SpO2:  [93 %-99 %] 93 %  BP: (134-166)/(67-92) 158/79     Weight: 89.8 kg (198 lb)  Body mass index is 33.99 kg/m².    Intake/Output Summary (Last 24 hours) at 1/3/2020 1341  Last data filed at 1/3/2020 0609  Gross per 24 hour   Intake 60 ml   Output 500 ml   Net -440 ml      Physical Exam   Constitutional: She is oriented to person, place, and time. She appears well-developed and well-nourished. She does not have a sickly appearance. She does not appear ill. No distress.   HENT:   Head: Normocephalic and atraumatic.   Eyes: Conjunctivae and  EOM are normal.   Neck: Normal range of motion. Neck supple. JVD present.   Cardiovascular: Regular rhythm and intact distal pulses.   Pulmonary/Chest: Effort normal. No respiratory distress. She has rales. She exhibits no tenderness.   Abdominal: Soft. Bowel sounds are normal. She exhibits no distension. There is tenderness (diffuse TTP). There is no rebound and no guarding.   Musculoskeletal: Normal range of motion. She exhibits no edema or tenderness.   Neurological: She is alert and oriented to person, place, and time. No sensory deficit.   Skin: Skin is warm and dry. She is not diaphoretic.   Psychiatric: She has a normal mood and affect. Her behavior is normal. Judgment and thought content normal.       Significant Labs:   A1C:   Recent Labs   Lab 12/07/19  0132 01/03/20  0800   HGBA1C 10.3* 9.7*     Blood Culture:   Recent Labs   Lab 01/02/20  1700   LABBLOO No Growth to date  No Growth to date     BMP:   Recent Labs   Lab 01/03/20  0401   *   *   K 3.8   CL 99   CO2 24   BUN 31*   CREATININE 1.2   CALCIUM 9.2   MG 2.0     CBC:   Recent Labs   Lab 01/02/20  1305 01/02/20  1700 01/03/20  0401   WBC 12.11 14.16* 10.24  10.24   HGB 12.5 12.5 12.2  12.2   HCT 39.7 38.6 39.7  39.7    361* 326  326     Cardiac Markers:   Recent Labs   Lab 01/02/20  1129   BNP 1,013*     Lactic Acid:   Recent Labs   Lab 01/02/20  1700   LACTATE 1.3     Lipase:   Recent Labs   Lab 01/02/20  1305   LIPASE 8     Lipid Panel:   Recent Labs   Lab 01/03/20  0401   CHOL 246*   HDL 41   LDLCALC 182.0*   TRIG 115   CHOLHDL 16.7*     Magnesium:   Recent Labs   Lab 01/03/20  0401   MG 2.0     POCT Glucose:   Recent Labs   Lab 01/02/20  2233 01/03/20  0809 01/03/20  1326   POCTGLUCOSE 328* 154* 119*     Troponin:   Recent Labs   Lab 01/02/20  2359 01/03/20  0402 01/03/20  0800   TROPONINI 0.171* 0.143* 0.181*     Urine Studies:   Recent Labs   Lab 01/02/20 2010   COLORU Straw   APPEARANCEUA Clear   PHUR 6.0   SPECGRAV  "1.025   PROTEINUA 3+*   GLUCUA 2+*   KETONESU Negative   BILIRUBINUA Negative   OCCULTUA 2+*   NITRITE Negative   LEUKOCYTESUR Negative   RBCUA 22*   WBCUA 2   BACTERIA Occasional   SQUAMEPITHEL 1   HYALINECASTS 0       Significant Imaging: I have reviewed and interpreted all pertinent imaging results/findings within the past 24 hours.      Assessment/Plan:      * Acute coronary syndrome  53 y/o AAF with known medical medical issues of chronic combined systolic and diastolic HF (EF 35% Grade II DD), CAD S/P MI 2010, Angiogram 12/18/19 mid LAD 90% stenosis, mid OM2 75% stenosis, and Ostial D1 70% stenosis LVDEP 25 mmHg, PAD S/P PTA with stents BLE 2015, IDDM (A1c 10.3%), HTN, HLD presenting with HARTMAN and nausea/ vomiting and "heart burn" like chest discomfort similar in characteristic for her last MI in 2010.     EKG - Sinus tachycardia without ST changes  1st Troponin - 0.176  BNP - 1,013    Seen by Cardiology in the ED who suggested ACS work up and CTS consult due to multi-vessel coronary disease  TTE: minimal changes since last echo. CVP 8 mmHg     PLAN:  -- Heparin gtt ACS protocol - for 48 hours stop date 1/4/20  --  mg in ED with 81 mg daily and Plavix load 300 mg X1 Holding Plavix in anticipation of CABG tentatively 1/7/20   -- Lasix 80mg IV BID   -- EKG PRN  -- continuous cardiac monitoring  -- CTS Consulted - tentatively planned for CABG depending on the CTA results and her repeat HbA1C, plan for surgery involving hybrid off pump coronary artery bypass (LIMA-LAD) with postoperative percutaneous coronary intervention to the LCx.  -- Famotidine IV 20 mg BID - for prevention of GI Bleeds in ACS work up  -- High intensity atorvastatin 80 mg QHS    CAD (coronary artery disease)  CAD S/P MI 2010, Angiogram 12/18/19 mid LAD 90% stenosis, mid OM2 75% stenosis, and Ostial D1 70% stenosis LVDEP 25 mmHg    PLAN:  -- Continue GDMT, ASA 81 mg, Home Coreg 12.5 mg BID, Lisinopril 40 mg QD,  Atorvastatin 80 mg " QD    Insulin dependent diabetes mellitus  A1c 12/2019 - 10.3% 1/3/2020 - 9.7%    BG on admission was 347. Patient reports not taking her insulin for 2 days due to low P.O. Intake  Home regimen: Aspart 9 U WM + Lantus 50 U QHS    PLAN:  -- Started patient on 75% of home regimen and will titrate as needed. Aspart 7 U TIDWM and Detemir 40 U QHS  -- POC BG 4 X daily  -- LDSSI PRN  -- Goal -180      Acute on chronic combined systolic (congestive) and diastolic (congestive) heart failure  TTE 12/6/19: Moderately decreased left ventricular systolic function. The estimated ejection fraction is 35%. Grade II (moderate) left ventricular diastolic dysfunction consistent with pseudonormalization. Moderate global hypokinetic wall motion. Normal right ventricular systolic function. Severe left atrial enlargement. Mild-to-moderate mitral regurgitation. Mild to moderate tricuspid regurgitation. The estimated PA systolic pressure is 49 mm Hg.Pulmonary hypertension present. Normal central venous pressure (3 mm Hg).    CXR 1/2/20: Mild interstitial edema with pulmonary vasculature congestion    TTE 1/3/20: Mild left ventricular enlargement. Moderately decreased left ventricular systolic function. The estimated ejection fraction is 35%. Grade II (moderate) left ventricular diastolic dysfunction consistent with pseudonormalization. Septal wall has abnormal motion. Local segmental wall motion abnormalities. Moderate left atrial enlargement. Normal right ventricular systolic function. Mild mitral regurgitation. Mild tricuspid regurgitation. Mild pulmonic regurgitation. Intermediate central venous pressure (8 mm Hg). The estimated PA systolic pressure is 46 mm Hg. Very small aneurysm in inferoapex visualized on image 47. The quantitatively dervived ejection fraction is 34%. Mild aortic regurgitation. Pulmonary hypertension present.    PLAN:  -- Continue Home Coreg 12.5 mg BID  -- Continue Home Lisinopril 40 mg QD  -- IV lasix 80 mg  BID  -- Strict I/Os  -- Daily standing weights  -- Low sodium (<2g) diet with 1500 cc fluid restriction        Mixed hyperlipidemia  Home med:  Atorvastatin 80 mg    Lipid panel 12/16/19: T cholesterol 244, HDL 46, , Triglyceride 135  Lipid panel 1/3/2020: T cholesterol 246 HDL 41 ; Triglycerides 115    PLAN:  -- Continue home statin  -- goal LDL <70     Non-intractable vomiting with nausea  On admission patient having two days of non-bloody non-bilious emesis with poor P.O. Intake    PLAN: - Improved  -- IV 4mg Zofran Q4H PRN   -- P.O. Phenergan 25 mg PRN  -- EKG daily to assess for QTc. 1/3/20 EKG QTc - 479      Essential hypertension  Home: Lisinopril 40 mg QD, Coreg 12.5 mg BID    PLAN:  -- Continue home medicine  -- Goal BP <140/90      Peripheral vascular disease  S/P PTA with stents BLE 2015    PLAN:  -- Obtain CTA Abd/pelvis with BLE run off to assess for viable vasculature in the legs if needed for CABG.      VTE Risk Mitigation (From admission, onward)         Ordered     Place sequential compression device  Until discontinued      01/02/20 1518     IP VTE HIGH RISK PATIENT  Once      01/02/20 1518     heparin 25,000 units in dextrose 5% 250 mL (100 units/mL) infusion LOW INTENSITY nomogram - [facility]  Continuous     Question:  Heparin Infusion Adjustment (DO NOT MODIFY ANSWER)  Answer:  \[email or web address]\epic\Images\Pharmacy\HeparinInfusions\heparin LOW INTENSITY nomogram for [facility] [id number]    01/02/20 1254     heparin 25,000 units in dextrose 5% (100 units/ml) IV bolus from bag - ADDITIONAL PRN BOLUS - 60 units/kg (max bolus 4000 units)  As needed (PRN)     Question:  Heparin Infusion Adjustment (DO NOT MODIFY ANSWER)  Answer:  \[employer]\epic\Images\Pharmacy\HeparinInfusions\heparin LOW INTENSITY nomogram for [facility] [id number]    01/02/20 1254     heparin 25,000 units in dextrose 5% (100 units/ml) IV bolus from bag - ADDITIONAL PRN BOLUS - 30 units/kg (max bolus 4000 units)  As needed (PRN)      Question:  Heparin Infusion Adjustment (DO NOT MODIFY ANSWER)  Answer:  \\[email or web address]\epic\Images\Pharmacy\HeparinInfusions\heparin LOW INTENSITY nomogram for [facility] [id number]    01/02/20 1254                  Batsheva Torres DO  Department of Hospital Medicine   [facility]

## 2020-01-03 NOTE — ASSESSMENT & PLAN NOTE
- CABG tentatively planned for Tuesday  - c/w ASA, full potency statin, ACEi, and Coreg  - Plavix stopped per CTS recommendations   - c/w UFH to complete 48h of therapy from initiation

## 2020-01-03 NOTE — ASSESSMENT & PLAN NOTE
CAD S/P MI 2010, Angiogram 12/18/19 mid LAD 90% stenosis, mid OM2 75% stenosis, and Ostial D1 70% stenosis LVDEP 25 mmHg    PLAN:  -- Continue GDMT, ASA 81 mg, Home Coreg 12.5 mg BID, Lisinopril 40 mg QD,  Atorvastatin 80 mg QD

## 2020-01-03 NOTE — CONSULTS
[facility]  Cardiothoracic Surgery  Consult Note    Patient Name: Suyapa Connelly  MRN: [id number]  Admission Date: 1/2/2020  Attending Physician: Lucas Bueno MD  Referring Provider: Self, Aaareferral    Patient information was obtained from patient, relative(s) and past medical records.     Inpatient consult to Cardiothoracic Surgery  Consult performed by: Christian Dobson MD  Consult ordered by: Onur Suarez MD        Subjective:     Principal Problem: Acute coronary syndrome    History of Present Illness: 53 yo woman with CHF (EF 35%), PAD with claudication and prior stenting, HTN, poorly controlled IDDM (HbAIC 10.3), CAD p/w 1 week of weight gain and leg swelling not responsive to home diuretics with nausea and intolerance of home meds with palpitations at home. Because of the nausea, she has not been taking her PO meds nor her insulin. She denies chest pain, fever. Admits to chills, HARTMAN. Pt and family state that she is to have an appt with Dr. Altamirano (scheduled for 1/15) to discuss the possibility of bypass of surgery. She is being treated for acute on chronic HF and given high-risk, she will be initiated on ACS protocol, per primary.    No current facility-administered medications on file prior to encounter.      Current Outpatient Medications on File Prior to Encounter   Medication Sig    aspirin 81 MG Chew Take 1 tablet (81 mg total) by mouth once daily.    atorvastatin (LIPITOR) 80 MG tablet Take 1 tablet (80 mg total) by mouth once daily.    blood sugar diagnostic (ONETOUCH ULTRA BLUE TEST STRIP) Strp Use to test blood glucose twice daily    blood sugar diagnostic Strp test  blood glucose 2 (two) times daily with meals.    blood-glucose meter Misc use as directed    blood-glucose meter Misc Use to test blood glucose    carvedilol (COREG) 12.5 MG tablet Take 1 tablet (12.5 mg total) by mouth 2 (two) times daily with meals.    escitalopram oxalate (LEXAPRO) 10 MG  "tablet Take 1 tablet (10 mg total) by mouth once daily.    famotidine (PEPCID) 20 MG tablet Take 1 tablet (20 mg total) by mouth 2 (two) times daily.    insulin aspart U-100 (NOVOLOG) 100 unit/mL injection Inject 9 Units into the skin 3 (three) times daily with meals.    insulin glargine 100 units/mL (3mL) SubQ pen Inject 50 Units into the skin every evening.    lancets 30 gauge Misc use to test blood glucose 2 (two) times daily with meals.    lisinopril (PRINIVIL,ZESTRIL) 40 MG tablet Take 1 tablet (40 mg total) by mouth once daily.    ondansetron (ZOFRAN-ODT) 8 MG TbDL Dissolve 1 tablet (8 mg total) by mouth every 8 (eight) hours as needed.    pen needle, diabetic 31 gauge x 5/16" Ndle Use to inject insulin 2 (two) times daily with meals.    pen needle, diabetic 32 gauge x 5/32" Ndle 1 each by Misc.(Non-Drug; Combo Route) route 2 (two) times daily with meals.    torsemide (DEMADEX) 20 MG Tab Take 2 tablets (40 mg total) by mouth once daily.    gabapentin (NEURONTIN) 300 MG capsule Take 1 capsule (300 mg total) by mouth 3 (three) times daily.    hydrOXYzine pamoate (VISTARIL) 50 MG Cap Take 1 capsule (50 mg total) by mouth every 6 (six) hours as needed (anxiety).    [DISCONTINUED] lancing device Misc 1 Device by Misc.(Non-Drug; Combo Route) route 2 (two) times daily with meals.       Review of patient's allergies indicates:   Allergen Reactions    Pcn [penicillins]      rash       Past Medical History:   Diagnosis Date    Anxiety     Depression     Diabetes mellitus     type 2    GERD (gastroesophageal reflux disease)     Hyperlipemia     Hypertension     Myocardial infarction 2010    minor-caused by stress per pt.     Past Surgical History:   Procedure Laterality Date    Angiogram - Right Extremity Right 7/9/15    angiogram-left leg  10/6/15    CATHETERIZATION OF BOTH LEFT AND RIGHT HEART N/A 12/18/2019    Procedure: CATHETERIZATION, HEART, BOTH LEFT AND RIGHT;  Surgeon: Que Fernando " MD VICTORIA;  Location: [facility] CATH LAB;  Service: Cardiology;  Laterality: N/A;    CORONARY ANGIOGRAPHY N/A 12/18/2019    Procedure: ANGIOGRAM, CORONARY ARTERY;  Surgeon: Que Fernando III, MD;  Location: [facility] CATH LAB;  Service: Cardiology;  Laterality: N/A;     Family History     None        Tobacco Use    Smoking status: Never Smoker    Smokeless tobacco: Never Used   Substance and Sexual Activity    Alcohol use: No    Drug use: Yes     Types: Marijuana     Comment: used yesterday    Sexual activity: Not on file     Review of Systems   Constitutional: Positive for activity change, fatigue and unexpected weight change. Negative for chills, diaphoresis and fever.   HENT: Negative for congestion and facial swelling.    Eyes: Negative for pain.   Respiratory: Positive for shortness of breath. Negative for apnea, cough, chest tightness and wheezing.    Cardiovascular: Positive for palpitations and leg swelling. Negative for chest pain.   Gastrointestinal: Negative for abdominal pain.   Musculoskeletal: Negative for back pain and gait problem.   Neurological: Positive for weakness. Negative for dizziness, syncope and headaches.   Psychiatric/Behavioral: Negative for behavioral problems.     Objective:     Vital Signs (Most Recent):  Temp: 99 °F (37.2 °C) (01/02/20 2054)  Pulse: 96 (01/02/20 2054)  Resp: 14 (01/02/20 2054)  BP: (!) 166/92 (01/02/20 2054)  SpO2: (!) 94 % (01/02/20 2054) Vital Signs (24h Range):  Temp:  [98.3 °F (36.8 °C)-99 °F (37.2 °C)] 99 °F (37.2 °C)  Pulse:  [] 96  Resp:  [14-22] 14  SpO2:  [94 %-100 %] 94 %  BP: (134-166)/() 166/92     Weight: 98 kg (216 lb)  Body mass index is 36.5 kg/m².    SpO2: (!) 94 %  O2 Device (Oxygen Therapy): room air     Intake/Output - Last 3 Shifts     None           Lines/Drains/Airways     Peripheral Intravenous Line                 Peripheral IV - Single Lumen 01/02/20 1737 20 G Left Forearm less than 1 day         Peripheral IV - Single Lumen  01/02/20 1809 20 G Left Forearm less than 1 day               STS Risk Score: 1.71%    Physical Exam   Constitutional: She is oriented to person, place, and time. She appears well-developed and well-nourished.   HENT:   Head: Normocephalic and atraumatic.   Eyes: Pupils are equal, round, and reactive to light.   Neck: JVD present.   Cardiovascular: Regular rhythm.   Mild tachycardia, noted PVCs   Pulmonary/Chest: Effort normal and breath sounds normal. No respiratory distress. She has no wheezes. She exhibits no tenderness.   Abdominal: Soft. She exhibits no distension. There is no tenderness.   Musculoskeletal: She exhibits edema (mild edema bilaterally, L>R).   Neurological: She is alert and oriented to person, place, and time.   Skin: Skin is warm and dry.   Psychiatric: She has a normal mood and affect.     Significant Labs:  BMP:   Recent Labs   Lab 01/02/20  1950   *   *   K 4.0   CL 99   CO2 20*   BUN 27*   CREATININE 1.2   CALCIUM 9.1     CBC:   Recent Labs   Lab 01/02/20  1700   WBC 14.16*   RBC 4.06   HGB 12.5   HCT 38.6   *   MCV 95   MCH 30.8   MCHC 32.4       Significant Diagnostics:  12/6 ECHO: EF 35%, mild-mod TR and MR, pulm HTN  12/18 Cath: 90% mid LAD, 75% OM2, 70% pD1  12/6 CT: small bilateral pleural effusions    Assessment/Plan:     NYHA Score: NYHA III: marked limitation of physical activity, comfortable at rest    CAD (coronary artery disease)  55 yo woman with CHF (EF 35%), PAD with claudication and prior stenting, HTN, poorly controlled IDDM (HbAIC 10.3), CAD p/w 1 week of weight gain and leg swelling not responsive to home diuretics with nausea and intolerance of home meds with palpitations at home. She is being treated for acute on chronic HF and given high-risk, she will be initiated on ACS protocol, per primary.  Given patient's significant co-morbidities as listed above she would benefit from optimal medical management at this time for her acute on chronic heart  failure, evaluation of her BLE stenting given ongoing claudication, control of her diabetes, and further discussion concerning the best course of action for her which may include a combination of surgery and PCI.          Thank you for your consult.     Christian Dobson MD  Cardiothoracic Surgery  [facility]    I have seen the patient and reviewed the fellow's note above. I have personally interviewed and examined the patient at bedside and agree with the findings.     Ms. Connelly is a 54 year-old woman with a history of heart failure reduced ejection fraction (35% ejection fraction) due to ischemic cardiomyopathy, known coronary artery disease s/p MI (2012), peripheral arterial disease s/p stents to bilateral lower extremities (2015), hypertension, and poorly controlled diabetes (HbA1C of 10.3 12/7/2019), who was admitted with NSTEMI (troponin max 0.17) and heart failure exasperation (BNP 1,013).  Prior to this episode, she has been symptomatic with dyspnea on exertion (about 1 block) and claudication (at 50 feet).  Her most recent echocardiogram showed 35% ejection fraction, LVEDD of 5.2cm, mild to moderate mitral regurgitation, mild to moderate tricuspid regurgitation, estimated systolic PA pressure of 49mmHg.  Angiogram showed 90% early mid LAD lesion with a moderate sized distal LAD target in a very long wrap around LAD, significant disease in a small first diagonal artery and diffusely diseased second diagonal artery, 70% lesion in mid LCx, and diffusely diseased small RCA with the inferior septum being supplied by the wrap around LAD (minimal PDA noted).  CTA chest showed minimal ascending aortic calcifications and mild aortic arch calcifications.    We had an extensive discussion with her regarding her symptoms and disease, particularly focusing on the very poor control of her diabetes in which she has worsening ischemic heart disease and worsening peripheral vascular disease.  With  her HbA1C of 10.3, I recommend repeat HbA1C and for her to see an inpatient endocrinologist.  Additionally, we should obtain a CTA of the lower extremities to assess the severity of her peripheral arterial disease.  Depending on the CTA results and her repeat HbA1C, we may plan for surgery involving hybrid off pump coronary artery bypass (LIMA-LAD) with postoperative percutaneous coronary intervention to the LCx.  If her HbA1C remains elevated, we will plan to see her in clinic on Wednesday, January 29, 2020 with a repeat HbA1C, and plan for surgery on Tuesday, February 4, 2020.    Please obtain HbA1C, inpatient endocrinology consult, CTA of the lower extremities, and a carotid ultrasound.        Huang Altamirano MD  Cardiothoracic Surgery  [facility]

## 2020-01-03 NOTE — ED NOTES
Report received from DEANGELO Delgado. Assume care of pt. Pt resting comfortably and independently repositioned in stretcher with bed locked in lowest position for safety. NAD noted at this time. Respirations even and unlabored and visible chest rise noted.  Patient offered bathroom assistance and denies need at this time, educated pt on the need for urine sample to obtain UA. Pt instructed to call if assistance is needed. Pt on continuous cardiac, BP, and O2 monitoring. Call light within reach. Denies needs at this time. Will continue to monitor.

## 2020-01-03 NOTE — ASSESSMENT & PLAN NOTE
On admission patient having two days of non-bloody non-bilious emesis with poor P.O. Intake    PLAN: - Improved  -- IV 4mg Zofran Q4H PRN   -- P.O. Phenergan 25 mg PRN  -- EKG daily to assess for QTc. 1/3/20 EKG QTc - 479

## 2020-01-03 NOTE — PT/OT/SLP EVAL
Physical Therapy Evaluation and Discharge Note    Patient Name:  Suyapa Connelly   MRN:  [id number]    Recommendations:     Discharge Recommendations:  home   Discharge Equipment Recommendations: none   Barriers to discharge: None    Assessment:     Suyapa Connelly is a 54 y.o. female admitted with a medical diagnosis of Acute coronary syndrome. .  At this time, patient is functioning at their prior level of function and does not require further acute PT services.     Recent Surgery: * No surgery found *      Plan:     During this hospitalization, patient does not require further acute PT services.  Please re-consult if situation changes.      Subjective     Chief Complaint: having not eaten due to N/V  Patient/Family Comments/goals: be able to eat  Pain/Comfort:  · Pain Rating 1: 0/10  · Pain Rating Post-Intervention 1: 0/10    Patients cultural, spiritual, Cheondoism conflicts given the current situation: no    Living Environment:  Pt lives with family in [facility] with [facility]  Prior to admission, patients level of function was independent.  Equipment used at home: none.  DME owned (not currently used): none.  Upon discharge, patient will have assistance from family if needed.    Objective:     Communicated with nurse Ritesh prior to session.  Patient found supine with peripheral IV upon PT entry to room.    General Precautions: Standard, (standard)   Orthopedic Precautions:N/A   Braces: N/A     Exams:  · Cognitive Exam:  Patient is oriented to Person, Place, Time and Situation  · Gross Motor Coordination:  WFL  · Postural Exam:  Patient presented with the following abnormalities:    · -       No postural abnormalities identified  · Skin Integrity/Edema:      · -       Skin integrity: Visible skin intact  · RUE ROM: WFL  · RUE Strength: WFL  · LUE ROM: WFL  · LUE Strength: WFL  · RLE ROM: WFL  · RLE Strength: WFL  · LLE ROM: WFL  · LLE Strength: WFL    Functional Mobility:  · Bed Mobility:     · Supine to Sit:  independence  · Sit to Supine: independence  · Transfers:     · Sit to Stand:  independence with no AD  · Bed to Chair: independence with  no AD  using  Step Transfer  · Gait: independent  · Balance: WNL    AM-PAC 6 CLICK MOBILITY  Total Score:24       Therapeutic Activities and Exercises:   Pt presented supine in bed, agreeable to PT assessment.  Pt transferred supine<>sit<>stand  I'ly.  Independent in gait. Pt reports I toilet transfer.  No deficits noted. No further PT indicated at this time.  Discussed with pt who is in agreement    AM-PAC 6 CLICK MOBILITY  Total Score:24     Patient left supine with all lines intact, call button in reach and Ritesh, nurse notified.    GOALS:   Multidisciplinary Problems     Physical Therapy Goals     Not on file          Multidisciplinary Problems (Resolved)        Problem: Physical Therapy Goal    Goal Priority Disciplines Outcome Goal Variances Interventions   Physical Therapy Goal   (Resolved)     PT, PT/OT Met     Description:  Eval only                    History:     Past Medical History:   Diagnosis Date    Anxiety     Depression     Diabetes mellitus     type 2    GERD (gastroesophageal reflux disease)     Hyperlipemia     Hypertension     Myocardial infarction 2010    minor-caused by stress per pt.       Past Surgical History:   Procedure Laterality Date    Angiogram - Right Extremity Right 7/9/15    angiogram-left leg  10/6/15    CATHETERIZATION OF BOTH LEFT AND RIGHT HEART N/A 12/18/2019    Procedure: CATHETERIZATION, HEART, BOTH LEFT AND RIGHT;  Surgeon: Que Fernando III, MD;  Location: [facility] CATH LAB;  Service: Cardiology;  Laterality: N/A;    CORONARY ANGIOGRAPHY N/A 12/18/2019    Procedure: ANGIOGRAM, CORONARY ARTERY;  Surgeon: Que Fernando III, MD;  Location: [facility] CATH LAB;  Service: Cardiology;  Laterality: N/A;       Time Tracking:     PT Received On: 01/03/20  PT Start Time: 0838     PT Stop Time: 0851  PT Total Time (min): 13 min      Billable Minutes: Evaluation 13      Sebastián Coleman, PT  01/03/2020

## 2020-01-03 NOTE — ASSESSMENT & PLAN NOTE
Home med:  Atorvastatin 80 mg    Lipid panel 12/16/19: T cholesterol 244, HDL 46, , Triglyceride 135  Lipid panel 1/3/2020: T cholesterol 246 HDL 41 ; Triglycerides 115    PLAN:  -- Continue home statin  -- goal LDL <70

## 2020-01-03 NOTE — HOSPITAL COURSE
55 y/o AAF with known medical medical issues of chronic combined systolic and diastolic HF (EF 35% Grade II DD), CAD S/P MI 2010, Angiogram 12/18/19 mid LAD 90% stenosis, mid OM2 75% stenosis, and Ostial D1 70% stenosis LVDEP 25 mmHg, PAD S/P PTA with stents BLE 2015, IDDM (A1c 10.3%), HTN, HLD, GERD, Anxiety/ Depression. Worked up for NSTEMI/ACS due to Chest discomfort nonspecific ST changes on EKG and elevated troponin. Patient had an MI in 2002 with similar prodrome/symptoms leading up to the acute coronary event. Patient tentatively planned for CABG depending on the CTA results and her repeat HbA1C for 1/7/20, plan for surgery involving hybrid off pump coronary artery bypass (LIMA-LAD) with postoperative percutaneous coronary intervention to the LCx. Will hold Plavix in anticipation of surgery and will start heparin gtt after 48 hour per Cards recommendations. Patient developed 10/10 right sided non radiating sharp chest pain 1/4/20 after eating. Ordered Troponin - 0.188 -->0.169, CXR - Interval improvement of opacity in the right lower lung zone not pneumothorax or widened midiastineum, EKG - No ST changes to rule out MI and gave subliqual nitro X3. Gave a GI cocktail to rule out indigestion. Patient felt relief after burping. Endocrinology following for optimal diabetes management in anticipation of surgery per CTS request. 1/5/2020: Patient denies chest pain. She developed ROSLYN on CKD which is most likely multifactorial. Urine electrolytes, U/s retroperitoneal unremarkable. Nephrology consulted with Cr trending up, which has normalized 1.2>3.4>4.2> 2.6> 1.2> 1.0.

## 2020-01-03 NOTE — SUBJECTIVE & OBJECTIVE
Interval History: NAEON. The patient denies any dyspnea and symptoms she was experiencing yesterday. Repeat EKG shows sinus rhythm with PVCs. Troponins have plateaued. CTS assessed the patient and tentively planning for CABG with PCI Tuesday. Cards signed off.    Review of Systems   Constitutional: Positive for activity change, fatigue and unexpected weight change. Negative for appetite change, chills, diaphoresis and fever.   HENT: Negative for sinus pressure, sore throat and trouble swallowing.    Eyes: Negative for photophobia and visual disturbance.   Respiratory: Negative for cough, choking, chest tightness, shortness of breath and wheezing.    Cardiovascular: Negative for chest pain, palpitations and leg swelling.   Gastrointestinal: Positive for nausea. Negative for abdominal distention, abdominal pain (mild diffuse (but mainly in epigastrium)), blood in stool, constipation, diarrhea and vomiting.   Endocrine: Negative for polyphagia and polyuria.   Genitourinary: Negative for difficulty urinating, dysuria and flank pain.   Musculoskeletal: Negative for back pain and gait problem.   Skin: Negative for color change and pallor.   Neurological: Negative for tremors, syncope, weakness, light-headedness and headaches.   Psychiatric/Behavioral: Negative for agitation and confusion.     Objective:     Vital Signs (Most Recent):  Temp: 97.5 °F (36.4 °C) (01/03/20 1332)  Pulse: 98 (01/03/20 1332)  Resp: 18 (01/03/20 1332)  BP: (!) 158/79 (01/03/20 1332)  SpO2: (!) 93 % (01/03/20 1332) Vital Signs (24h Range):  Temp:  [97.5 °F (36.4 °C)-99 °F (37.2 °C)] 97.5 °F (36.4 °C)  Pulse:  [] 98  Resp:  [14-20] 18  SpO2:  [93 %-99 %] 93 %  BP: (134-166)/(67-92) 158/79     Weight: 89.8 kg (198 lb)  Body mass index is 33.99 kg/m².    Intake/Output Summary (Last 24 hours) at 1/3/2020 1341  Last data filed at 1/3/2020 0609  Gross per 24 hour   Intake 60 ml   Output 500 ml   Net -440 ml      Physical Exam   Constitutional: She  is oriented to person, place, and time. She appears well-developed and well-nourished. She does not have a sickly appearance. She does not appear ill. No distress.   HENT:   Head: Normocephalic and atraumatic.   Eyes: Conjunctivae and EOM are normal.   Neck: Normal range of motion. Neck supple. JVD present.   Cardiovascular: Regular rhythm and intact distal pulses.   Pulmonary/Chest: Effort normal. No respiratory distress. She has rales. She exhibits no tenderness.   Abdominal: Soft. Bowel sounds are normal. She exhibits no distension. There is tenderness (diffuse TTP). There is no rebound and no guarding.   Musculoskeletal: Normal range of motion. She exhibits no edema or tenderness.   Neurological: She is alert and oriented to person, place, and time. No sensory deficit.   Skin: Skin is warm and dry. She is not diaphoretic.   Psychiatric: She has a normal mood and affect. Her behavior is normal. Judgment and thought content normal.       Significant Labs:   A1C:   Recent Labs   Lab 12/07/19  0132 01/03/20  0800   HGBA1C 10.3* 9.7*     Blood Culture:   Recent Labs   Lab 01/02/20  1700   LABBLOO No Growth to date  No Growth to date     BMP:   Recent Labs   Lab 01/03/20  0401   *   *   K 3.8   CL 99   CO2 24   BUN 31*   CREATININE 1.2   CALCIUM 9.2   MG 2.0     CBC:   Recent Labs   Lab 01/02/20  1305 01/02/20  1700 01/03/20  0401   WBC 12.11 14.16* 10.24  10.24   HGB 12.5 12.5 12.2  12.2   HCT 39.7 38.6 39.7  39.7    361* 326  326     Cardiac Markers:   Recent Labs   Lab 01/02/20  1129   BNP 1,013*     Lactic Acid:   Recent Labs   Lab 01/02/20  1700   LACTATE 1.3     Lipase:   Recent Labs   Lab 01/02/20  1305   LIPASE 8     Lipid Panel:   Recent Labs   Lab 01/03/20  0401   CHOL 246*   HDL 41   LDLCALC 182.0*   TRIG 115   CHOLHDL 16.7*     Magnesium:   Recent Labs   Lab 01/03/20  0401   MG 2.0     POCT Glucose:   Recent Labs   Lab 01/02/20  2233 01/03/20  0809 01/03/20  1326   POCTGLUCOSE 328*  154* 119*     Troponin:   Recent Labs   Lab 01/02/20  2359 01/03/20  0402 01/03/20  0800   TROPONINI 0.171* 0.143* 0.181*     Urine Studies:   Recent Labs   Lab 01/02/20 2010   COLORU Straw   APPEARANCEUA Clear   PHUR 6.0   SPECGRAV 1.025   PROTEINUA 3+*   GLUCUA 2+*   KETONESU Negative   BILIRUBINUA Negative   OCCULTUA 2+*   NITRITE Negative   LEUKOCYTESUR Negative   RBCUA 22*   WBCUA 2   BACTERIA Occasional   SQUAMEPITHEL 1   HYALINECASTS 0       Significant Imaging: I have reviewed and interpreted all pertinent imaging results/findings within the past 24 hours.

## 2020-01-03 NOTE — ASSESSMENT & PLAN NOTE
"55 y/o AAF with known medical medical issues of chronic combined systolic and diastolic HF (EF 35% Grade II DD), CAD S/P MI 2010, Angiogram 12/18/19 mid LAD 90% stenosis, mid OM2 75% stenosis, and Ostial D1 70% stenosis LVDEP 25 mmHg, PAD S/P PTA with stents BLE 2015, IDDM (A1c 10.3%), HTN, HLD presenting with HARTMAN and nausea/ vomiting and "heart burn" like chest discomfort similar in characteristic for her last MI in 2010.     EKG - Sinus tachycardia without ST changes  1st Troponin - 0.176  BNP - 1,013    Seen by Cardiology in the ED who suggested ACS work up and CTS consult due to multi-vessel coronary disease  TTE: minimal changes since last echo. CVP 8 mmHg     PLAN:  -- Heparin gtt ACS protocol - for 48 hours stop date 1/4/20  --  mg in ED with 81 mg daily and Plavix load 300 mg X1 Holding Plavix in anticipation of CABG tentatively 1/7/20   -- Lasix 80mg IV BID   -- EKG PRN  -- continuous cardiac monitoring  -- CTS Consulted - tentatively planned for CABG depending on the CTA results and her repeat HbA1C, plan for surgery involving hybrid off pump coronary artery bypass (LIMA-LAD) with postoperative percutaneous coronary intervention to the LCx.  -- Famotidine IV 20 mg BID - for prevention of GI Bleeds in ACS work up  -- High intensity atorvastatin 80 mg QHS  "

## 2020-01-03 NOTE — ASSESSMENT & PLAN NOTE
S/P PTA with stents BLE 2015    PLAN:  -- Obtain CTA Abd/pelvis with BLE run off to assess for viable vasculature in the legs if needed for CABG.

## 2020-01-03 NOTE — ASSESSMENT & PLAN NOTE
55 yo woman with CHF (EF 35%), PAD with claudication and prior stenting, HTN, poorly controlled IDDM (HbAIC 10.3), CAD p/w 1 week of weight gain and leg swelling not responsive to home diuretics with nausea and intolerance of home meds with palpitations at home. She is being treated for acute on chronic HF and given high-risk, she will be initiated on ACS protocol, per primary.  Given patient's significant co-morbidities as listed above she would benefit from optimal medical management at this time for her acute on chronic heart failure, evaluation of her BLE stenting given ongoing claudication, control of her diabetes, and further discussion concerning the best course of action for her which may include a combination of surgery and PCI.

## 2020-01-03 NOTE — ASSESSMENT & PLAN NOTE
Home: Lisinopril 40 mg QD, Coreg 12.5 mg BID    PLAN:  -- Continue home medicine  -- Goal BP <140/90

## 2020-01-03 NOTE — CONSULTS
"[facility]  Endocrinology  Diabetes Consult Note    Consult Requested by: Jacinto Eugene MD   Reason for admit: Acute coronary syndrome    HISTORY OF PRESENT ILLNESS:  Reason for Consult: Management of T2DM, Hyperglycemia     Surgical Procedure and Date: n/a    Diabetes diagnosis year:      Home Diabetes Medications:  Lantus 50 units daily and novolog 9 units with meals   Lab Results   Component Value Date    HGBA1C 9.7 (H) 2020         How often checking glucose at home? 1-3 x day   BG readings on regimen: 100-200 typically; sometimes in 300s   Hypoglycemia on the regimen?  Yes occasionally - usually overnight   Missed doses on regimen?  Yes    Diabetes Complications include:     Hyperglycemia, Hypoglycemia  and Diabetic peripheral neuropathy     Complicating diabetes co morbidities:   History of MI and CHF      HPI:   Patient is a 54 y.o. female with a diagnosis of uncontrolled type 2 DM. Also with CHF, PAD, HTN, HLD, and CAD. Patient presented with chest pain and diaphoresis. Also with nauseated, diaphoretic, a mild headache, epigastric pain, with a vague "heart burn" like feeling in her chest. Patient admitted for treatment and further workup. Endocrinology consulted for BG/ DM management.             Interval HPI:   [facility] to [facility]. BG above goal on admit but trending down with scheduled insulin. Tentative CABG on Tuesday per notes.   Eatin%  Nausea: No  Hypoglycemia and intervention: Yes 57 - given novolog when still on SF clears and basal heavy - NPO until that time; given d10 infusion BG now 153.   Fever: No  TPN and/or TF: No      PMH, PSH, FH, SH reviewed     Review of Systems  Constitutional: + for weight changes.  Eyes: Negative for visual disturbance.  Respiratory: Negative for cough.   Cardiovascular: negative for chest pain; + "flutter"  Gastrointestinal: + for nausea.  Endocrine: + for polyuria, polydipsia.  Musculoskeletal: Negative for back pain.  Skin: " Negative for rash.  Neurological: Negative for syncope.  Psychiatric/Behavioral: Negative for depression.    Current Medications and/or Treatments Impacting Glycemic Control  Immunotherapy:    Immunosuppressants     None        Steroids:   Hormones (From admission, onward)    None        Pressors:    Autonomic Drugs (From admission, onward)    None        Hyperglycemia/Diabetes Medications:   Antihyperglycemics (From admission, onward)    Start     Stop Route Frequency Ordered    01/02/20 2100  insulin detemir U-100 pen 40 Units      -- SubQ Nightly 01/02/20 1537    01/02/20 1645  insulin aspart U-100 pen 7 Units      -- SubQ 3 times daily with meals 01/02/20 1536    01/02/20 1615  insulin aspart U-100 pen 0-5 Units      -- SubQ Before meals & nightly PRN 01/02/20 1518             PHYSICAL EXAMINATION:  Vitals:    01/03/20 1332   BP: (!) 158/79   Pulse: 98   Resp: 18   Temp: 97.5 °F (36.4 °C)     Body mass index is 33.99 kg/m².    Physical Exam   Constitutional: Well developed, well nourished, NAD.  ENT: External ears no masses with nose patent; normal hearing.  Neck: Supple; trachea midline.  Cardiovascular: Normal heart sounds, no LE edema. DP +2 bilaterally.  Lungs: Normal effort; lungs anterior bilaterally clear to auscultation.  Abdomen: Soft, no masses, no hernias.  MS: No clubbing or cyanosis of nails noted;  unable to assess gait.  Skin: No rashes, lesions, or ulcers; no nodules. Injection sites are ok. No lipo hypertropthy or atrophy.  Psychiatric: Good judgement and insight; normal mood and affect.  Neurological: Cranial nerves are grossly intact.   Foot: nails in good condition, no amputations noted.          Labs Reviewed and Include   Recent Labs   Lab 01/03/20  0401   *   CALCIUM 9.2   ALBUMIN 2.4*   PROT 7.5   *   K 3.8   CO2 24   CL 99   BUN 31*   CREATININE 1.2   ALKPHOS 98   ALT 5*   AST 10   BILITOT 0.6     Lab Results   Component Value Date    WBC 10.24 01/03/2020    WBC 10.24  01/03/2020    HGB 12.2 01/03/2020    HGB 12.2 01/03/2020    HCT 39.7 01/03/2020    HCT 39.7 01/03/2020    MCV 98 01/03/2020    MCV 98 01/03/2020     01/03/2020     01/03/2020     Recent Labs   Lab 01/02/20  1701   TSH 0.562     Lab Results   Component Value Date    HGBA1C 9.7 (H) 01/03/2020       Nutritional status:   Body mass index is 33.99 kg/m².  Lab Results   Component Value Date    ALBUMIN 2.4 (L) 01/03/2020    ALBUMIN 2.5 (L) 01/02/2020    ALBUMIN 2.5 (L) 12/06/2019     No results found for: PREALBUMIN    Estimated Creatinine Clearance: 58.8 mL/min (based on SCr of 1.2 mg/dL).    Accu-Checks  Recent Labs     01/02/20  1654 01/02/20  2233 01/03/20  0809 01/03/20  1326 01/03/20  1524 01/03/20  1540   POCTGLUCOSE 361* 328* 154* 119* 57* 153*        ASSESSMENT and PLAN    Type 2 diabetes mellitus with hyperglycemia, with long-term current use of insulin  BG goal 140-180.     Home TDD 77 units but is basal heavy at home.   Decrease TDD by 20% (60 units daily) given inpatient goal and redistribute for basal/prandial matching. Also correlates with weight based 0.8 u/kg dosing.     Levemir 30 units daily.   novolog 10 units with meals.   BG monitoring ac/hs and moderate dose correction scale.       CAD (coronary artery disease)  Optimize glucose control and  avoid hypoglycemia        Acute on chronic combined systolic (congestive) and diastolic (congestive) heart failure  Optimize glucose control and  avoid hypoglycemia              Plan discussed with patient at bedside.     Fely Puga, NP  Endocrinology  [facility]

## 2020-01-03 NOTE — SUBJECTIVE & OBJECTIVE
Interval History: CTS plans on CABG on Tuesday.    Review of Systems   Constitution: Negative for chills and fever.   HENT: Negative for ear discharge.    Eyes: Negative for pain and visual disturbance.   Cardiovascular: Negative for chest pain, dyspnea on exertion, irregular heartbeat, leg swelling, orthopnea, palpitations, paroxysmal nocturnal dyspnea and syncope.   Respiratory: Negative for hemoptysis, shortness of breath and wheezing.    Skin: Negative for rash and suspicious lesions.   Musculoskeletal: Negative for joint pain and muscle weakness.   Gastrointestinal: Negative for abdominal pain, diarrhea, nausea and vomiting.   Genitourinary: Negative for dysuria and frequency.   Neurological: Negative for focal weakness, headaches and tremors.     Objective:     Vital Signs (Most Recent):  Temp: 98.5 °F (36.9 °C) (01/03/20 0806)  Pulse: 92 (01/03/20 0806)  Resp: 16 (01/03/20 0806)  BP: (!) 158/89 (01/03/20 0806)  SpO2: 97 % (01/03/20 0806) Vital Signs (24h Range):  Temp:  [98.5 °F (36.9 °C)-99 °F (37.2 °C)] 98.5 °F (36.9 °C)  Pulse:  [] 92  Resp:  [14-20] 16  SpO2:  [93 %-99 %] 97 %  BP: (134-166)/(67-92) 158/89     Weight: 89.8 kg (198 lb)  Body mass index is 33.99 kg/m².     SpO2: 97 %  O2 Device (Oxygen Therapy): room air      Intake/Output Summary (Last 24 hours) at 1/3/2020 1256  Last data filed at 1/3/2020 0609  Gross per 24 hour   Intake 60 ml   Output 500 ml   Net -440 ml       Lines/Drains/Airways     Peripheral Intravenous Line                 Peripheral IV - Single Lumen 01/02/20 1737 20 G Left Forearm less than 1 day         Peripheral IV - Single Lumen 01/02/20 1809 20 G Right Forearm less than 1 day                Physical Exam   Constitutional: She is oriented to person, place, and time. She appears well-developed and well-nourished.   HENT:   Head: Normocephalic and atraumatic.   Eyes: EOM are normal.   Cardiovascular: Normal rate and regular rhythm. Exam reveals no gallop and no friction  rub.   Pulmonary/Chest: Effort normal and breath sounds normal. No stridor. She has no wheezes. She has no rales.   Abdominal: Soft. Bowel sounds are normal. There is no rebound and no guarding.   Musculoskeletal: She exhibits no edema.   Neurological: She is alert and oriented to person, place, and time. No cranial nerve deficit.   Skin: Skin is warm and dry.   Psychiatric: She has a normal mood and affect. Her behavior is normal.       Significant Labs: All pertinent lab results from the last 24 hours have been reviewed.

## 2020-01-03 NOTE — ASSESSMENT & PLAN NOTE
BG goal 140-180.     Home TDD 77 units but is basal heavy at home.   Decrease TDD by 20% (60 units daily) given inpatient goal and redistribute for basal/prandial matching. Also correlates with weight based 0.8 u/kg dosing.     Levemir 30 units daily.   novolog 10 units with meals.   BG monitoring ac/hs and moderate dose correction scale.

## 2020-01-03 NOTE — ASSESSMENT & PLAN NOTE
TTE 12/6/19: Moderately decreased left ventricular systolic function. The estimated ejection fraction is 35%. Grade II (moderate) left ventricular diastolic dysfunction consistent with pseudonormalization. Moderate global hypokinetic wall motion. Normal right ventricular systolic function. Severe left atrial enlargement. Mild-to-moderate mitral regurgitation. Mild to moderate tricuspid regurgitation. The estimated PA systolic pressure is 49 mm Hg.Pulmonary hypertension present. Normal central venous pressure (3 mm Hg).    CXR 1/2/20: Mild interstitial edema with pulmonary vasculature congestion    TTE 1/3/20: Mild left ventricular enlargement. Moderately decreased left ventricular systolic function. The estimated ejection fraction is 35%. Grade II (moderate) left ventricular diastolic dysfunction consistent with pseudonormalization. Septal wall has abnormal motion. Local segmental wall motion abnormalities. Moderate left atrial enlargement. Normal right ventricular systolic function. Mild mitral regurgitation. Mild tricuspid regurgitation. Mild pulmonic regurgitation. Intermediate central venous pressure (8 mm Hg). The estimated PA systolic pressure is 46 mm Hg. Very small aneurysm in inferoapex visualized on image 47. The quantitatively dervived ejection fraction is 34%. Mild aortic regurgitation. Pulmonary hypertension present.    PLAN:  -- Continue Home Coreg 12.5 mg BID  -- Continue Home Lisinopril 40 mg QD  -- IV lasix 80 mg BID  -- Strict I/Os  -- Daily standing weights  -- Low sodium (<2g) diet with 1500 cc fluid restriction

## 2020-01-03 NOTE — PLAN OF CARE
Pt presented supine in bed, agreeable to PT assessment.  Pt transferred supine<>sit<>stand  I'ly.  Independent in gait. Pt reports I toilet transfer.  No deficits noted. No further PT indicated at this time.  Discussed with pt who is in agreement

## 2020-01-03 NOTE — SUBJECTIVE & OBJECTIVE
"Interval HPI:   Ad[facility] to [facility]. BG above goal on admit but trending down with scheduled insulin. Tentative CABG on Tuesday per notes.   Eatin%  Nausea: No  Hypoglycemia and intervention: Yes 57 - given novolog when still on SF clears and basal heavy - NPO until that time; given d10 infusion BG now 153.   Fever: No  TPN and/or TF: No      PMH, PSH, FH, SH reviewed     Review of Systems  Constitutional: + for weight changes.  Eyes: Negative for visual disturbance.  Respiratory: Negative for cough.   Cardiovascular: negative for chest pain; + "flutter"  Gastrointestinal: + for nausea.  Endocrine: + for polyuria, polydipsia.  Musculoskeletal: Negative for back pain.  Skin: Negative for rash.  Neurological: Negative for syncope.  Psychiatric/Behavioral: Negative for depression.    Current Medications and/or Treatments Impacting Glycemic Control  Immunotherapy:    Immunosuppressants     None        Steroids:   Hormones (From admission, onward)    None        Pressors:    Autonomic Drugs (From admission, onward)    None        Hyperglycemia/Diabetes Medications:   Antihyperglycemics (From admission, onward)    Start     Stop Route Frequency Ordered    20 2100  insulin detemir U-100 pen 40 Units      -- SubQ Nightly 20 1537    20 1645  insulin aspart U-100 pen 7 Units      -- SubQ 3 times daily with meals 20 1536    20 1615  insulin aspart U-100 pen 0-5 Units      -- SubQ Before meals & nightly PRN 20 1518             PHYSICAL EXAMINATION:  Vitals:    20 1332   BP: (!) 158/79   Pulse: 98   Resp: 18   Temp: 97.5 °F (36.4 °C)     Body mass index is 33.99 kg/m².    Physical Exam   Constitutional: Well developed, well nourished, NAD.  ENT: External ears no masses with nose patent; normal hearing.  Neck: Supple; trachea midline.  Cardiovascular: Normal heart sounds, no LE edema. DP +2 bilaterally.  Lungs: Normal effort; lungs anterior bilaterally clear to auscultation.  Abdomen: " Soft, no masses, no hernias.  MS: No clubbing or cyanosis of nails noted;  unable to assess gait.  Skin: No rashes, lesions, or ulcers; no nodules. Injection sites are ok. No lipo hypertropthy or atrophy.  Psychiatric: Good judgement and insight; normal mood and affect.  Neurological: Cranial nerves are grossly intact.   Foot: nails in good condition, no amputations noted.

## 2020-01-03 NOTE — ED NOTES
Telemetry Verification   Patient placed on Telemetry Box  Verified with War Room  Box # [id number]   Monitor Tech    Rate 96   Rhythm NSR

## 2020-01-03 NOTE — NURSING
APTT OF 24.8 NOTED. 60 U/KG BOLUS INFUSED AND UNIT INCREASE OF 3 PERFORMED PER NOMOGRAM. NEW DOSE OF 19 U/KG (13.8 ML/HR). NEXT APTT SCHEDULED FOR 2130.

## 2020-01-03 NOTE — PT/OT/SLP PROGRESS
Occupational Therapy      Patient Name:  Suyapa Connelly   MRN:  [id number]    Patient not seen today secondary to patient being off this unit this am (2 attempts) in ultrasound. Attempted patient in pm and patient was sitting long sitting in bed and reported that she probably has low blood sugar. This therapist gave patient to wipe her face. Patient appeared lethargic and sweating. Charge nurse and nursing assessed patient and low blood sugar noted (57). Patient unable to participate in a formal OT evaluation at this time and returned to supine position in bed with HOB elevated with nursing present (in their care). Will follow-up for an evaluation as medically appropriate.    ANNY Canseco  1/3/2020

## 2020-01-03 NOTE — PROGRESS NOTES
[facility]  Cardiology  Progress Note    Patient Name: Suyapa Connelly  MRN: [id number]  Admission Date: 1/2/2020  Hospital Length of Stay: 1 days  Code Status: Full Code   Attending Physician: Jacinto Eugene MD   Primary Care Physician: Erlinda Rahman NP  Expected Discharge Date: 1/6/2020  Principal Problem:Acute coronary syndrome    Subjective:     Interval History: CTS plans on CABG on Tuesday.    Review of Systems   Constitution: Negative for chills and fever.   HENT: Negative for ear discharge.    Eyes: Negative for pain and visual disturbance.   Cardiovascular: Negative for chest pain, dyspnea on exertion, irregular heartbeat, leg swelling, orthopnea, palpitations, paroxysmal nocturnal dyspnea and syncope.   Respiratory: Negative for hemoptysis, shortness of breath and wheezing.    Skin: Negative for rash and suspicious lesions.   Musculoskeletal: Negative for joint pain and muscle weakness.   Gastrointestinal: Negative for abdominal pain, diarrhea, nausea and vomiting.   Genitourinary: Negative for dysuria and frequency.   Neurological: Negative for focal weakness, headaches and tremors.     Objective:     Vital Signs (Most Recent):  Temp: 98.5 °F (36.9 °C) (01/03/20 0806)  Pulse: 92 (01/03/20 0806)  Resp: 16 (01/03/20 0806)  BP: (!) 158/89 (01/03/20 0806)  SpO2: 97 % (01/03/20 0806) Vital Signs (24h Range):  Temp:  [98.5 °F (36.9 °C)-99 °F (37.2 °C)] 98.5 °F (36.9 °C)  Pulse:  [] 92  Resp:  [14-20] 16  SpO2:  [93 %-99 %] 97 %  BP: (134-166)/(67-92) 158/89     Weight: 89.8 kg (198 lb)  Body mass index is 33.99 kg/m².     SpO2: 97 %  O2 Device (Oxygen Therapy): room air      Intake/Output Summary (Last 24 hours) at 1/3/2020 1256  Last data filed at 1/3/2020 0609  Gross per 24 hour   Intake 60 ml   Output 500 ml   Net -440 ml       Lines/Drains/Airways     Peripheral Intravenous Line                 Peripheral IV - Single Lumen 01/02/20 1737 20 G Left Forearm less than 1 day          Peripheral IV - Single Lumen 01/02/20 1809 20 G Right Forearm less than 1 day                Physical Exam   Constitutional: She is oriented to person, place, and time. She appears well-developed and well-nourished.   HENT:   Head: Normocephalic and atraumatic.   Eyes: EOM are normal.   Cardiovascular: Normal rate and regular rhythm. Exam reveals no gallop and no friction rub.   Pulmonary/Chest: Effort normal and breath sounds normal. No stridor. She has no wheezes. She has no rales.   Abdominal: Soft. Bowel sounds are normal. There is no rebound and no guarding.   Musculoskeletal: She exhibits no edema.   Neurological: She is alert and oriented to person, place, and time. No cranial nerve deficit.   Skin: Skin is warm and dry.   Psychiatric: She has a normal mood and affect. Her behavior is normal.       Significant Labs: All pertinent lab results from the last 24 hours have been reviewed.      Assessment and Plan:       CAD (coronary artery disease)  - CABG tentatively planned for Tuesday  - c/w ASA, full potency statin, ACEi, and Coreg  - Plavix stopped per CTS recommendations   - c/w UFH to complete 48h of therapy from initiation      Acute on chronic combined systolic (congestive) and diastolic (congestive) heart failure  - c/w ACEi and BB  - c/w diuresis         VTE Risk Mitigation (From admission, onward)         Ordered     Place sequential compression device  Until discontinued      01/02/20 1518     IP VTE HIGH RISK PATIENT  Once      01/02/20 1518     heparin 25,000 units in dextrose 5% 250 mL (100 units/mL) infusion LOW INTENSITY nomogram - [facility]  Continuous     Question:  Heparin Infusion Adjustment (DO NOT MODIFY ANSWER)  Answer:  [email or web address]\epic\Images\Pharmacy\HeparinInfusions\heparin LOW INTENSITY nomogram for [facility] [id number]    01/02/20 1254     heparin 25,000 units in dextrose 5% (100 units/ml) IV bolus from bag - ADDITIONAL PRN BOLUS - 60 units/kg (max bolus 4000 units)  As needed (PRN)      Question:  Heparin Infusion Adjustment (DO NOT MODIFY ANSWER)  Answer:  \\[email or web address]\epic\Images\Pharmacy\HeparinInfusions\heparin LOW INTENSITY nomogram for [facility] [id number].pdf    01/02/20 1254     heparin 25,000 units in dextrose 5% (100 units/ml) IV bolus from bag - ADDITIONAL PRN BOLUS - 30 units/kg (max bolus 4000 units)  As needed (PRN)     Question:  Heparin Infusion Adjustment (DO NOT MODIFY ANSWER)  Answer:  \\[email or web address]\epic\Images\Pharmacy\HeparinInfusions\heparin LOW INTENSITY nomogram for [facility] [id number]    01/02/20 1254                Timmy Farnsworth MD  Cardiology  [facility]

## 2020-01-03 NOTE — HPI
"Reason for Consult: Management of T2DM, Hyperglycemia     Surgical Procedure and Date: n/a    Diabetes diagnosis year:  2006    Home Diabetes Medications:  Lantus 50 units daily and novolog 9 units with meals   Lab Results   Component Value Date    HGBA1C 9.7 (H) 01/03/2020         How often checking glucose at home? 1-3 x day   BG readings on regimen: 100-200 typically; sometimes in 300s   Hypoglycemia on the regimen?  Yes occasionally - usually overnight   Missed doses on regimen?  Yes    Diabetes Complications include:     Hyperglycemia, Hypoglycemia  and Diabetic peripheral neuropathy     Complicating diabetes co morbidities:   History of MI and CHF      HPI:   Patient is a 54 y.o. female with a diagnosis of uncontrolled type 2 DM. Also with CHF, PAD, HTN, HLD, and CAD. Patient presented with chest pain and diaphoresis. Also with nauseated, diaphoretic, a mild headache, epigastric pain, with a vague "heart burn" like feeling in her chest. Patient admitted for treatment and further workup. Endocrinology consulted for BG/ DM management.           "

## 2020-01-03 NOTE — SUBJECTIVE & OBJECTIVE
"No current facility-administered medications on file prior to encounter.      Current Outpatient Medications on File Prior to Encounter   Medication Sig    aspirin 81 MG Chew Take 1 tablet (81 mg total) by mouth once daily.    atorvastatin (LIPITOR) 80 MG tablet Take 1 tablet (80 mg total) by mouth once daily.    blood sugar diagnostic (ONETOUCH ULTRA BLUE TEST STRIP) Strp Use to test blood glucose twice daily    blood sugar diagnostic Strp test  blood glucose 2 (two) times daily with meals.    blood-glucose meter Misc use as directed    blood-glucose meter Misc Use to test blood glucose    carvedilol (COREG) 12.5 MG tablet Take 1 tablet (12.5 mg total) by mouth 2 (two) times daily with meals.    escitalopram oxalate (LEXAPRO) 10 MG tablet Take 1 tablet (10 mg total) by mouth once daily.    famotidine (PEPCID) 20 MG tablet Take 1 tablet (20 mg total) by mouth 2 (two) times daily.    insulin aspart U-100 (NOVOLOG) 100 unit/mL injection Inject 9 Units into the skin 3 (three) times daily with meals.    insulin glargine 100 units/mL (3mL) SubQ pen Inject 50 Units into the skin every evening.    lancets 30 gauge Misc use to test blood glucose 2 (two) times daily with meals.    lisinopril (PRINIVIL,ZESTRIL) 40 MG tablet Take 1 tablet (40 mg total) by mouth once daily.    ondansetron (ZOFRAN-ODT) 8 MG TbDL Dissolve 1 tablet (8 mg total) by mouth every 8 (eight) hours as needed.    pen needle, diabetic 31 gauge x 5/16" Ndle Use to inject insulin 2 (two) times daily with meals.    pen needle, diabetic 32 gauge x 5/32" Ndle 1 each by Misc.(Non-Drug; Combo Route) route 2 (two) times daily with meals.    torsemide (DEMADEX) 20 MG Tab Take 2 tablets (40 mg total) by mouth once daily.    gabapentin (NEURONTIN) 300 MG capsule Take 1 capsule (300 mg total) by mouth 3 (three) times daily.    hydrOXYzine pamoate (VISTARIL) 50 MG Cap Take 1 capsule (50 mg total) by mouth every 6 (six) hours as needed (anxiety).    " [DISCONTINUED] lancing device Misc 1 Device by Misc.(Non-Drug; Combo Route) route 2 (two) times daily with meals.       Review of patient's allergies indicates:   Allergen Reactions    Pcn [penicillins]      rash       Past Medical History:   Diagnosis Date    Anxiety     Depression     Diabetes mellitus     type 2    GERD (gastroesophageal reflux disease)     Hyperlipemia     Hypertension     Myocardial infarction 2010    minor-caused by stress per pt.     Past Surgical History:   Procedure Laterality Date    Angiogram - Right Extremity Right 7/9/15    angiogram-left leg  10/6/15    CATHETERIZATION OF BOTH LEFT AND RIGHT HEART N/A 12/18/2019    Procedure: CATHETERIZATION, HEART, BOTH LEFT AND RIGHT;  Surgeon: Que Fernando III, MD;  Location: [facility] CATH LAB;  Service: Cardiology;  Laterality: N/A;    CORONARY ANGIOGRAPHY N/A 12/18/2019    Procedure: ANGIOGRAM, CORONARY ARTERY;  Surgeon: Que Fernando III, MD;  Location: [facility] CATH LAB;  Service: Cardiology;  Laterality: N/A;     Family History     None        Tobacco Use    Smoking status: Never Smoker    Smokeless tobacco: Never Used   Substance and Sexual Activity    Alcohol use: No    Drug use: Yes     Types: Marijuana     Comment: used yesterday    Sexual activity: Not on file     Review of Systems   Constitutional: Positive for activity change, fatigue and unexpected weight change. Negative for chills, diaphoresis and fever.   HENT: Negative for congestion and facial swelling.    Eyes: Negative for pain.   Respiratory: Positive for shortness of breath. Negative for apnea, cough, chest tightness and wheezing.    Cardiovascular: Positive for palpitations and leg swelling. Negative for chest pain.   Gastrointestinal: Negative for abdominal pain.   Musculoskeletal: Negative for back pain and gait problem.   Neurological: Positive for weakness. Negative for dizziness, syncope and headaches.   Psychiatric/Behavioral: Negative for behavioral  problems.     Objective:     Vital Signs (Most Recent):  Temp: 99 °F (37.2 °C) (01/02/20 2054)  Pulse: 96 (01/02/20 2054)  Resp: 14 (01/02/20 2054)  BP: (!) 166/92 (01/02/20 2054)  SpO2: (!) 94 % (01/02/20 2054) Vital Signs (24h Range):  Temp:  [98.3 °F (36.8 °C)-99 °F (37.2 °C)] 99 °F (37.2 °C)  Pulse:  [] 96  Resp:  [14-22] 14  SpO2:  [94 %-100 %] 94 %  BP: (134-166)/() 166/92     Weight: 98 kg (216 lb)  Body mass index is 36.5 kg/m².    SpO2: (!) 94 %  O2 Device (Oxygen Therapy): room air     Intake/Output - Last 3 Shifts     None           Lines/Drains/Airways     Peripheral Intravenous Line                 Peripheral IV - Single Lumen 01/02/20 1737 20 G Left Forearm less than 1 day         Peripheral IV - Single Lumen 01/02/20 1809 20 G Left Forearm less than 1 day               STS Risk Score: 1.71%    Physical Exam   Constitutional: She is oriented to person, place, and time. She appears well-developed and well-nourished.   HENT:   Head: Normocephalic and atraumatic.   Eyes: Pupils are equal, round, and reactive to light.   Neck: JVD present.   Cardiovascular: Regular rhythm.   Mild tachycardia, noted PVCs   Pulmonary/Chest: Effort normal and breath sounds normal. No respiratory distress. She has no wheezes. She exhibits no tenderness.   Abdominal: Soft. She exhibits no distension. There is no tenderness.   Musculoskeletal: She exhibits edema (mild edema bilaterally, L>R).   Neurological: She is alert and oriented to person, place, and time.   Skin: Skin is warm and dry.   Psychiatric: She has a normal mood and affect.     Significant Labs:  BMP:   Recent Labs   Lab 01/02/20  1950   *   *   K 4.0   CL 99   CO2 20*   BUN 27*   CREATININE 1.2   CALCIUM 9.1     CBC:   Recent Labs   Lab 01/02/20  1700   WBC 14.16*   RBC 4.06   HGB 12.5   HCT 38.6   *   MCV 95   MCH 30.8   MCHC 32.4       Significant Diagnostics:  12/6 ECHO: EF 35%, mild-mod TR and MR, pulm HTN  12/18 Cath: 90% mid  LAD, 75% OM2, 70% pD1  12/6 CT: small bilateral pleural effusions

## 2020-01-03 NOTE — HPI
Ms. Connelly is a 54 year-old woman with a history of heart failure reduced ejection fraction (35% ejection fraction) due to ischemic cardiomyopathy, known coronary artery disease s/p MI (2012), peripheral arterial disease s/p stents to bilateral lower extremities (2015), hypertension, and poorly controlled diabetes (HbA1C of 10.3 12/7/2019 and 9.7 on 1/3/2020), who was admitted with NSTEMI (troponin max 0.17) and heart failure exasperation (BNP 1,013), which was complicated by acute kidney injury.  Prior to this episode, she has been symptomatic with dyspnea on exertion (about 1 block) and claudication (at 50 feet).  Her most recent echocardiogram showed 35% ejection fraction, LVEDD of 5.2cm, mild to moderate mitral regurgitation, mild to moderate tricuspid regurgitation, estimated systolic PA pressure of 49mmHg.  Angiogram showed 90% early mid LAD lesion with a moderate sized distal LAD target in a very long wrap around LAD, significant disease in a small first diagonal artery and diffusely diseased second diagonal artery, 70% lesion in mid LCx, and diffusely diseased small RCA with the inferior septum being supplied by the wrap around LAD (minimal PDA noted).  CTA chest showed minimal ascending aortic calcifications and mild aortic arch calcifications.  CTA of the lower extremities showed severe peripheral vascular disease with multiple total occlusion and significant stenoses.     We had an extensive discussion with her regarding her symptoms and disease, particularly focusing on the very poor control of her diabetes in which she has worsening ischemic heart disease and worsening peripheral vascular disease.  He HbA1C has been improving since seeing Dr. Fernando in early December and this is her second heart failure admission in one month, so we should proceed with surgery, which will be hybrid revascularization with off pump coronary artery bypass surgery (LIMA-LAD) and postoperative percutaneous coronary  intervention.  Her significant lower extremity peripheral vascular disease is a concern.  The risk of lower extremity ischemia is high.  Additionally, the risk of sternal wound infection is high due to her poor diabetic control and her risk of recurrent acute kidney injury/worsened kidney function is increased due to preoperative acute kidney injury.  I had an extensive conversation with her and her family about these risks and they agreed to proceed with surgery.

## 2020-01-03 NOTE — NURSING
Spoke to MD concerning NPO order. Also informed MD pt only tolerating K rider at 30cc/hr. Attempted to increase infusion but unable to tolerate. MD does not want to run w NS due to CHF. MD states to run at slow rate. Po not ordered due to pt having nausea and emesis past several days

## 2020-01-04 PROBLEM — T50.8X5A CONTRAST DYE INDUCED NEPHROPATHY: Status: ACTIVE | Noted: 2020-01-04

## 2020-01-04 PROBLEM — N14.11 CONTRAST DYE INDUCED NEPHROPATHY: Status: ACTIVE | Noted: 2020-01-04

## 2020-01-04 LAB
ALBUMIN SERPL BCP-MCNC: 2.4 G/DL (ref 3.5–5.2)
ALP SERPL-CCNC: 93 U/L (ref 55–135)
ALT SERPL W/O P-5'-P-CCNC: 5 U/L (ref 10–44)
ANION GAP SERPL CALC-SCNC: 11 MMOL/L (ref 8–16)
APTT BLDCRRT: 49.5 SEC (ref 21–32)
AST SERPL-CCNC: 12 U/L (ref 10–40)
BASOPHILS # BLD AUTO: 0.03 K/UL (ref 0–0.2)
BASOPHILS NFR BLD: 0.4 % (ref 0–1.9)
BILIRUB SERPL-MCNC: 0.6 MG/DL (ref 0.1–1)
BUN SERPL-MCNC: 33 MG/DL (ref 6–20)
CALCIUM SERPL-MCNC: 9 MG/DL (ref 8.7–10.5)
CHLORIDE SERPL-SCNC: 98 MMOL/L (ref 95–110)
CO2 SERPL-SCNC: 24 MMOL/L (ref 23–29)
CREAT SERPL-MCNC: 1.7 MG/DL (ref 0.5–1.4)
DIFFERENTIAL METHOD: ABNORMAL
EOSINOPHIL # BLD AUTO: 0 K/UL (ref 0–0.5)
EOSINOPHIL NFR BLD: 0.4 % (ref 0–8)
ERYTHROCYTE [DISTWIDTH] IN BLOOD BY AUTOMATED COUNT: 12.5 % (ref 11.5–14.5)
EST. GFR  (AFRICAN AMERICAN): 39.1 ML/MIN/1.73 M^2
EST. GFR  (NON AFRICAN AMERICAN): 33.9 ML/MIN/1.73 M^2
GLUCOSE SERPL-MCNC: 136 MG/DL (ref 70–110)
HCT VFR BLD AUTO: 42.3 % (ref 37–48.5)
HGB BLD-MCNC: 13.2 G/DL (ref 12–16)
IMM GRANULOCYTES # BLD AUTO: 0.02 K/UL (ref 0–0.04)
IMM GRANULOCYTES NFR BLD AUTO: 0.3 % (ref 0–0.5)
LACTATE SERPL-SCNC: 1.1 MMOL/L (ref 0.5–2.2)
LYMPHOCYTES # BLD AUTO: 1.9 K/UL (ref 1–4.8)
LYMPHOCYTES NFR BLD: 27.9 % (ref 18–48)
MAGNESIUM SERPL-MCNC: 2.2 MG/DL (ref 1.6–2.6)
MCH RBC QN AUTO: 30.1 PG (ref 27–31)
MCHC RBC AUTO-ENTMCNC: 31.2 G/DL (ref 32–36)
MCV RBC AUTO: 97 FL (ref 82–98)
MONOCYTES # BLD AUTO: 0.7 K/UL (ref 0.3–1)
MONOCYTES NFR BLD: 10.3 % (ref 4–15)
NEUTROPHILS # BLD AUTO: 4.1 K/UL (ref 1.8–7.7)
NEUTROPHILS NFR BLD: 60.7 % (ref 38–73)
NRBC BLD-RTO: 0 /100 WBC
PHOSPHATE SERPL-MCNC: 3 MG/DL (ref 2.7–4.5)
PLATELET # BLD AUTO: 325 K/UL (ref 150–350)
PMV BLD AUTO: 10.7 FL (ref 9.2–12.9)
POCT GLUCOSE: 111 MG/DL (ref 70–110)
POCT GLUCOSE: 119 MG/DL (ref 70–110)
POCT GLUCOSE: 176 MG/DL (ref 70–110)
POCT GLUCOSE: 87 MG/DL (ref 70–110)
POTASSIUM SERPL-SCNC: 4.2 MMOL/L (ref 3.5–5.1)
PROT SERPL-MCNC: 7.7 G/DL (ref 6–8.4)
RBC # BLD AUTO: 4.38 M/UL (ref 4–5.4)
SODIUM SERPL-SCNC: 133 MMOL/L (ref 136–145)
TROPONIN I SERPL DL<=0.01 NG/ML-MCNC: 0.17 NG/ML (ref 0–0.03)
TROPONIN I SERPL DL<=0.01 NG/ML-MCNC: 0.19 NG/ML (ref 0–0.03)
WBC # BLD AUTO: 6.71 K/UL (ref 3.9–12.7)

## 2020-01-04 PROCEDURE — 84484 ASSAY OF TROPONIN QUANT: CPT | Mod: 91

## 2020-01-04 PROCEDURE — 93005 ELECTROCARDIOGRAM TRACING: CPT

## 2020-01-04 PROCEDURE — 36415 COLL VENOUS BLD VENIPUNCTURE: CPT

## 2020-01-04 PROCEDURE — 25000003 PHARM REV CODE 250: Performed by: STUDENT IN AN ORGANIZED HEALTH CARE EDUCATION/TRAINING PROGRAM

## 2020-01-04 PROCEDURE — 63600175 PHARM REV CODE 636 W HCPCS: Performed by: STUDENT IN AN ORGANIZED HEALTH CARE EDUCATION/TRAINING PROGRAM

## 2020-01-04 PROCEDURE — 93010 ELECTROCARDIOGRAM REPORT: CPT | Mod: ,,, | Performed by: INTERNAL MEDICINE

## 2020-01-04 PROCEDURE — 83605 ASSAY OF LACTIC ACID: CPT

## 2020-01-04 PROCEDURE — 83735 ASSAY OF MAGNESIUM: CPT

## 2020-01-04 PROCEDURE — 99233 PR SUBSEQUENT HOSPITAL CARE,LEVL III: ICD-10-PCS | Mod: ,,, | Performed by: STUDENT IN AN ORGANIZED HEALTH CARE EDUCATION/TRAINING PROGRAM

## 2020-01-04 PROCEDURE — 80053 COMPREHEN METABOLIC PANEL: CPT

## 2020-01-04 PROCEDURE — 84100 ASSAY OF PHOSPHORUS: CPT

## 2020-01-04 PROCEDURE — 93010 EKG 12-LEAD: ICD-10-PCS | Mod: ,,, | Performed by: INTERNAL MEDICINE

## 2020-01-04 PROCEDURE — 94761 N-INVAS EAR/PLS OXIMETRY MLT: CPT

## 2020-01-04 PROCEDURE — 85730 THROMBOPLASTIN TIME PARTIAL: CPT

## 2020-01-04 PROCEDURE — 99233 SBSQ HOSP IP/OBS HIGH 50: CPT | Mod: ,,, | Performed by: STUDENT IN AN ORGANIZED HEALTH CARE EDUCATION/TRAINING PROGRAM

## 2020-01-04 PROCEDURE — 93041 RHYTHM ECG TRACING: CPT

## 2020-01-04 PROCEDURE — 11000001 HC ACUTE MED/SURG PRIVATE ROOM

## 2020-01-04 PROCEDURE — 63600175 PHARM REV CODE 636 W HCPCS

## 2020-01-04 PROCEDURE — 85025 COMPLETE CBC W/AUTO DIFF WBC: CPT

## 2020-01-04 PROCEDURE — C9399 UNCLASSIFIED DRUGS OR BIOLOG: HCPCS | Performed by: STUDENT IN AN ORGANIZED HEALTH CARE EDUCATION/TRAINING PROGRAM

## 2020-01-04 PROCEDURE — S0028 INJECTION, FAMOTIDINE, 20 MG: HCPCS | Performed by: STUDENT IN AN ORGANIZED HEALTH CARE EDUCATION/TRAINING PROGRAM

## 2020-01-04 RX ORDER — INSULIN ASPART 100 [IU]/ML
5 INJECTION, SOLUTION INTRAVENOUS; SUBCUTANEOUS
Status: DISCONTINUED | OUTPATIENT
Start: 2020-01-04 | End: 2020-01-05

## 2020-01-04 RX ORDER — INSULIN ASPART 100 [IU]/ML
8 INJECTION, SOLUTION INTRAVENOUS; SUBCUTANEOUS
Status: DISCONTINUED | OUTPATIENT
Start: 2020-01-04 | End: 2020-01-04

## 2020-01-04 RX ORDER — INSULIN ASPART 100 [IU]/ML
0-5 INJECTION, SOLUTION INTRAVENOUS; SUBCUTANEOUS
Status: DISCONTINUED | OUTPATIENT
Start: 2020-01-04 | End: 2020-01-14

## 2020-01-04 RX ORDER — NITROGLYCERIN 0.4 MG/1
0.4 TABLET SUBLINGUAL EVERY 5 MIN PRN
Status: DISCONTINUED | OUTPATIENT
Start: 2020-01-04 | End: 2020-01-14

## 2020-01-04 RX ORDER — TALC
6 POWDER (GRAM) TOPICAL ONCE
Status: DISCONTINUED | OUTPATIENT
Start: 2020-01-04 | End: 2020-01-06

## 2020-01-04 RX ORDER — INSULIN ASPART 100 [IU]/ML
5 INJECTION, SOLUTION INTRAVENOUS; SUBCUTANEOUS
Status: DISCONTINUED | OUTPATIENT
Start: 2020-01-04 | End: 2020-01-04

## 2020-01-04 RX ORDER — DEXTROMETHORPHAN/PSEUDOEPHED 2.5-7.5/.8
20 DROPS ORAL 4 TIMES DAILY PRN
Status: DISCONTINUED | OUTPATIENT
Start: 2020-01-04 | End: 2020-01-14

## 2020-01-04 RX ORDER — IBUPROFEN 200 MG
16 TABLET ORAL
Status: DISCONTINUED | OUTPATIENT
Start: 2020-01-04 | End: 2020-01-14

## 2020-01-04 RX ORDER — AMLODIPINE BESYLATE 5 MG/1
5 TABLET ORAL DAILY
Status: DISCONTINUED | OUTPATIENT
Start: 2020-01-04 | End: 2020-01-04

## 2020-01-04 RX ORDER — INSULIN ASPART 100 [IU]/ML
10 INJECTION, SOLUTION INTRAVENOUS; SUBCUTANEOUS
Status: DISCONTINUED | OUTPATIENT
Start: 2020-01-04 | End: 2020-01-04

## 2020-01-04 RX ORDER — GLUCAGON 1 MG
1 KIT INJECTION
Status: DISCONTINUED | OUTPATIENT
Start: 2020-01-04 | End: 2020-01-14

## 2020-01-04 RX ORDER — IBUPROFEN 200 MG
24 TABLET ORAL
Status: DISCONTINUED | OUTPATIENT
Start: 2020-01-04 | End: 2020-01-14

## 2020-01-04 RX ORDER — FAMOTIDINE 10 MG/ML
20 INJECTION INTRAVENOUS DAILY
Status: DISCONTINUED | OUTPATIENT
Start: 2020-01-05 | End: 2020-01-06

## 2020-01-04 RX ORDER — FUROSEMIDE 10 MG/ML
80 INJECTION INTRAMUSCULAR; INTRAVENOUS ONCE
Status: COMPLETED | OUTPATIENT
Start: 2020-01-04 | End: 2020-01-04

## 2020-01-04 RX ORDER — NITROGLYCERIN 0.4 MG/1
TABLET SUBLINGUAL
Status: COMPLETED
Start: 2020-01-04 | End: 2020-01-04

## 2020-01-04 RX ADMIN — ALUMINUM HYDROXIDE, MAGNESIUM HYDROXIDE, AND SIMETHICONE 50 ML: 200; 200; 20 SUSPENSION ORAL at 10:01

## 2020-01-04 RX ADMIN — GABAPENTIN 300 MG: 300 CAPSULE ORAL at 08:01

## 2020-01-04 RX ADMIN — CARVEDILOL 12.5 MG: 12.5 TABLET, FILM COATED ORAL at 09:01

## 2020-01-04 RX ADMIN — GABAPENTIN 300 MG: 300 CAPSULE ORAL at 09:01

## 2020-01-04 RX ADMIN — ESCITALOPRAM OXALATE 10 MG: 10 TABLET ORAL at 09:01

## 2020-01-04 RX ADMIN — FUROSEMIDE 80 MG: 10 INJECTION, SOLUTION INTRAMUSCULAR; INTRAVENOUS at 09:01

## 2020-01-04 RX ADMIN — GABAPENTIN 300 MG: 300 CAPSULE ORAL at 02:01

## 2020-01-04 RX ADMIN — ONDANSETRON 4 MG: 2 INJECTION INTRAMUSCULAR; INTRAVENOUS at 03:01

## 2020-01-04 RX ADMIN — PROMETHAZINE HYDROCHLORIDE 25 MG: 25 TABLET ORAL at 10:01

## 2020-01-04 RX ADMIN — FAMOTIDINE 20 MG: 10 INJECTION, SOLUTION INTRAVENOUS at 09:01

## 2020-01-04 RX ADMIN — ASPIRIN 81 MG CHEWABLE TABLET 81 MG: 81 TABLET CHEWABLE at 09:01

## 2020-01-04 RX ADMIN — INSULIN DETEMIR 25 UNITS: 100 INJECTION, SOLUTION SUBCUTANEOUS at 09:01

## 2020-01-04 RX ADMIN — NITROGLYCERIN: 0.4 TABLET SUBLINGUAL at 09:01

## 2020-01-04 RX ADMIN — ATORVASTATIN CALCIUM 80 MG: 20 TABLET, FILM COATED ORAL at 09:01

## 2020-01-04 RX ADMIN — HEPARIN SODIUM 19 UNITS/KG/HR: 10000 INJECTION, SOLUTION INTRAVENOUS at 04:01

## 2020-01-04 RX ADMIN — SODIUM CHLORIDE 500 ML: 0.9 INJECTION, SOLUTION INTRAVENOUS at 01:01

## 2020-01-04 RX ADMIN — AMLODIPINE BESYLATE 5 MG: 5 TABLET ORAL at 09:01

## 2020-01-04 NOTE — ASSESSMENT & PLAN NOTE
S/P PTA with stents BLE 2015    CTA Ab/pelvis with run off:  1. Diffuse atherosclerotic disease resulting in stenosis of the origins of the celiac artery, SMA, bilateral accessory renal arteries.  2. There is diffuse atherosclerotic disease in the bilateral lower extremities with bilateral SFA-popliteal artery stent occlusion.  The popliteal arteries reconstitute distally with collateral vessels.  3. Leiomyomatous uterus.

## 2020-01-04 NOTE — ASSESSMENT & PLAN NOTE
On admission patient having two days of non-bloody non-bilious emesis with poor P.O. Intake    PLAN: - Improved  -- P.O. Phenergan 25 mg PRN  -- EKG daily to assess for QTc. 1/3/20 EKG QTc - 479  1/4/20 EKG Sinus rhythm with marked sinus arrythmia  Minimal voltage criteria for LVH, QT prolongation at 464

## 2020-01-04 NOTE — ASSESSMENT & PLAN NOTE
A1c 12/2019 - 10.3% 1/3/2020 - 9.7%    BG on admission was 347. Patient reports not taking her insulin for 2 days due to low P.O. Intake  Home regimen: Aspart 9 U WM + Lantus 50 U QHS    PLAN:  -- Per Endocrine recommendation; appreciate recs: Basal 25 U QHS and Prandial 8 U TIDWM  -- POC BG 4 X daily   -- LDSSI PRN  -- Goal -180

## 2020-01-04 NOTE — NURSING
Dr Torres notified BP 84/60 MANUALLY  Pt resting quietly denies any discomfort light headedness no chest pain , see order 500ccns bolus given at 250cc/hr  Will continue to monitor

## 2020-01-04 NOTE — NURSING
Resting quietly in bed denies chest pain the patient stated discomfort because lying down in bed  Will continue to monitor

## 2020-01-04 NOTE — NURSING
Dr Batsheva Torres notified that cont pulse ox not available and bp 95/55 with new order to hold coreg.

## 2020-01-04 NOTE — PROGRESS NOTES
[facility] Hospital Medicine  Progress Note    Patient Name: Suyapa Connelly  MRN: [id number]  Patient Class: IP- Inpatient   Admission Date: 1/2/2020  Length of Stay: 2 days  Attending Physician: Jacinto Eugene MD  Primary Care Provider: Erlinda Rahman NP    [facility] Medicine Team: [facility] MED 2 Batsheva Torres DO    Subjective:     Principal Problem:Acute coronary syndrome    HPI:  Ms. Connelly is a 55 y/o AAF with known medical medical issues of chronic combined systolic and diastolic HF (EF 35% Grade II DD), CAD S/P MI 2010, Angiogram 12/18/19 mid LAD 90% stenosis, mid OM2 75% stenosis, and Ostial D1 70% stenosis LVDEP 25 mmHg, PAD S/P PTA with stents BLE 2015, IDDM (A1c 10.3%), HTN, HLD, GERD, Anxiety/ Depression. Patient present with a 2 day history of nausea and with multiple episodes of non-bilious non-bloody emesis and couple of episodes of watery non-bloody diarrhea. Patient reports initially feeling HARTMAN without associated chest pain/tightness for the past week before progressively worsening to the GI symptoms described. She reports having a 7 lb weight gain (dry weight unknown) and only able to walk less than 20-30 ft before feeling short of breath. She reports that 5-10 minute rest resolves her dyspnea and she never has shortness of breath at rest. She reports feeling similar symptoms to this when she was admitted to the hospital on 12/6/19 for ADHF. On Monday 12/30 she doubled her home torsemide dosage to help reduce the excess water weight but reports that it did not help much. When he GI symptoms developed on Tuesday, she reports being unable to tolerate anything P.O. and subsequently stopped taking all of her home medications including her home insulin. What prompted her to come into the hospital was a fluttering feeling in her chest associated with some diaphoresis. She reports not remembering the symptoms she felt when she was having her MI in 2010 but  at bedside  "reported that she had nausea and vomiting leading up to her MI and she collapsed and lost consciousness and then later once admitted to the hospital was told she had had a heart attack. Currently the patient reports feeling nauseated, diaphoretic, a mild headache, epigastric pain, with a vague "heart burn" like feeling in her chest. She denies light headedness, chest tightness, dyspnea, palpitations, dysuria, constipation, edema. She denies any recent sick contacts and fevers at home.    In the ED, , EKG showed sinus tachycardia without ST changes, 1st troponin 0.176, BNP 1,013, CXR - mild interstitial edema with pulmonary vascular congestion. She was seen by cardiology who suggested an ACS work up due to her recent angiogram showing vessel blockages and her being a diabetic with similar vague symptoms from last MI. The also suggested for CTS to be consulted to assess for CABG. Patient was admitted to hospital medicine Team 2 for further management and work up.      Overview/Hospital Course:  53 y/o AAF with known medical medical issues of chronic combined systolic and diastolic HF (EF 35% Grade II DD), CAD S/P MI 2010, Angiogram 12/18/19 mid LAD 90% stenosis, mid OM2 75% stenosis, and Ostial D1 70% stenosis LVDEP 25 mmHg, PAD S/P PTA with stents BLE 2015, IDDM (A1c 10.3%), HTN, HLD, GERD, Anxiety/ Depression. Worked up for NSTEMI/ACS. Patient tentatively planned for CABG depending on the CTA results and her repeat HbA1C, plan for surgery involving hybrid off pump coronary artery bypass (LIMA-LAD) with postoperative percutaneous coronary intervention to the LCx. Will hold Plavix in anticipation of surgery and will start heparin gtt after 48 hour per Cards recommendations. Patient developed 10/10 right sided non radiating sharp chest pain 1/4/20 after eating. Ordered Troponin -   0.188 (ordered additional one in 4 hours), CXR - Interval improvement of opacity in the right lower lung zone not pneumothorax or " widened midiastineum, EKG - No ST changes to rule out MI and gave subliqual nitro X3. Gave a GI cocktail to rule out indigestion. Patient felt relief after burping.    Interval History: NAEON.  Patient developed 10/10 right sided non radiating sharp chest pain 1/4/20 after eating. Ordered Troponin - 0.188 (ordered additional one in 4 hours), CXR - Interval improvement of opacity in the right lower lung zone not pneumothorax or widened midiastineum, EKG - No ST changes to rule out MI and gave subliqual nitro X3. Gave a GI cocktail to rule out indigestion. Patient felt relief after burping. BP is soft. Giving 500 cc bolus of NS    Review of Systems   Constitutional: Positive for activity change, fatigue and unexpected weight change. Negative for appetite change, chills, diaphoresis and fever.   HENT: Negative for sinus pressure, sore throat and trouble swallowing.    Eyes: Negative for photophobia and visual disturbance.   Respiratory: Negative for cough, choking, chest tightness, shortness of breath and wheezing.    Cardiovascular: Positive for chest pain. Negative for palpitations and leg swelling.   Gastrointestinal: Positive for nausea. Negative for abdominal distention, abdominal pain, blood in stool, constipation, diarrhea and vomiting.   Endocrine: Negative for polyphagia and polyuria.   Genitourinary: Negative for difficulty urinating, dysuria and flank pain.   Musculoskeletal: Negative for back pain and gait problem.   Skin: Negative for color change and pallor.   Neurological: Negative for tremors, syncope, weakness, light-headedness and headaches.   Psychiatric/Behavioral: Negative for agitation and confusion.     Objective:     Vital Signs (Most Recent):  Temp: 97.2 °F (36.2 °C) (01/04/20 1257)  Pulse: 76 (01/04/20 1302)  Resp: 16 (01/04/20 1257)  BP: (!) 88/53 (01/04/20 1302)  SpO2: 98 % (01/04/20 1257) Vital Signs (24h Range):  Temp:  [96 °F (35.6 °C)-98.2 °F (36.8 °C)] 97.2 °F (36.2 °C)  Pulse:  [76-98]  76  Resp:  [14-18] 16  SpO2:  [93 %-99 %] 98 %  BP: ()/(51-82) 88/53     Weight: 90.2 kg (198 lb 13.7 oz)  Body mass index is 34.13 kg/m².    Intake/Output Summary (Last 24 hours) at 1/4/2020 1321  Last data filed at 1/4/2020 0400  Gross per 24 hour   Intake 240 ml   Output 2300 ml   Net -2060 ml      Physical Exam   Constitutional: She is oriented to person, place, and time. She appears well-developed and well-nourished. She does not have a sickly appearance. She does not appear ill. No distress.   HENT:   Head: Normocephalic and atraumatic.   Eyes: Conjunctivae and EOM are normal.   Neck: Normal range of motion. Neck supple. JVD (improving) present.   Cardiovascular: Regular rhythm and intact distal pulses.   Pulmonary/Chest: Effort normal. No respiratory distress. She has rales (improving). She exhibits no tenderness.   Abdominal: Soft. Bowel sounds are normal. She exhibits no distension. There is no tenderness. There is no rebound and no guarding.   Musculoskeletal: Normal range of motion. She exhibits no edema or tenderness.   Neurological: She is alert and oriented to person, place, and time. No sensory deficit.   Skin: Skin is warm and dry. She is not diaphoretic.   Psychiatric: She has a normal mood and affect. Her behavior is normal. Judgment and thought content normal.       Significant Labs:   Blood Culture:   Recent Labs   Lab 01/02/20  1700   LABBLOO No Growth to date  No Growth to date  No Growth to date  No Growth to date     CBC:   Recent Labs   Lab 01/02/20  1700 01/03/20  0401 01/04/20  0629   WBC 14.16* 10.24  10.24 6.71   HGB 12.5 12.2  12.2 13.2   HCT 38.6 39.7  39.7 42.3   * 326  326 325     CMP:   Recent Labs   Lab 01/02/20  1950 01/03/20  0401 01/04/20  0629   * 134* 133*   K 4.0 3.8 4.2   CL 99 99 98   CO2 20* 24 24   * 195* 136*   BUN 27* 31* 33*   CREATININE 1.2 1.2 1.7*   CALCIUM 9.1 9.2 9.0   PROT  --  7.5 7.7   ALBUMIN  --  2.4* 2.4*   BILITOT  --  0.6  "0.6   ALKPHOS  --  98 93   AST  --  10 12   ALT  --  5* 5*   ANIONGAP 14 11 11   EGFRNONAA 51.7* 51.7* 33.9*     Lipase: No results for input(s): LIPASE in the last 48 hours.  Magnesium:   Recent Labs   Lab 01/03/20  0401 01/04/20  0629   MG 2.0 2.2     Troponin:   Recent Labs   Lab 01/03/20  0800 01/03/20  1408 01/04/20  1004   TROPONINI 0.181* 0.180* 0.188*       Significant Imaging: I have reviewed and interpreted all pertinent imaging results/findings within the past 24 hours.      Assessment/Plan:      * Acute coronary syndrome  55 y/o AAF with known medical medical issues of chronic combined systolic and diastolic HF (EF 35% Grade II DD), CAD S/P MI 2010, Angiogram 12/18/19 mid LAD 90% stenosis, mid OM2 75% stenosis, and Ostial D1 70% stenosis LVDEP 25 mmHg, PAD S/P PTA with stents BLE 2015, IDDM (A1c 10.3%), HTN, HLD presenting with HARTMAN and nausea/ vomiting and "heart burn" like chest discomfort similar in characteristic for her last MI in 2010.     EKG - Sinus tachycardia without ST changes  1st Troponin - 0.176  BNP - 1,013    Seen by Cardiology in the ED who suggested ACS work up and CTS consult due to multi-vessel coronary disease  TTE: minimal changes since last echo. CVP 8 mmHg     PLAN:  -- Heparin gtt ACS protocol - for 48 hours stop date 1/4/20  --  mg in ED with 81 mg daily and Plavix load 300 mg X1 Holding Plavix in anticipation of CABG tentatively 1/7/20   -- Lasix 80mg IV BID   -- EKG PRN  -- continuous cardiac monitoring  -- CTS Consulted - tentatively planned for CABG depending on the CTA results and her repeat HbA1C, plan for surgery involving hybrid off pump coronary artery bypass (LIMA-LAD) with postoperative percutaneous coronary intervention to the LCx.  -- Famotidine IV 20 mg BID - for prevention of GI Bleeds in ACS work up  -- High intensity atorvastatin 80 mg QHS    Contrast dye induced nephropathy  PLAN:  -- current Cr 1.7  -- gave 500 cc bolus NS - Judicious due to EF 35%  -- " Avoid nephrotoxic medications if able  -- decreased lasix from 80 mg BID to QD      CAD (coronary artery disease)  CAD S/P MI 2010, Angiogram 12/18/19 mid LAD 90% stenosis, mid OM2 75% stenosis, and Ostial D1 70% stenosis LVDEP 25 mmHg    PLAN:  -- Continue GDMT, ASA 81 mg, Home Coreg 12.5 mg BID, Atorvastatin 80 mg QD    Type 2 diabetes mellitus with hyperglycemia, with long-term current use of insulin  A1c 12/2019 - 10.3% 1/3/2020 - 9.7%    BG on admission was 347. Patient reports not taking her insulin for 2 days due to low P.O. Intake  Home regimen: Aspart 9 U WM + Lantus 50 U QHS    PLAN:  -- Per Endocrine recommendation; appreciate recs: Basal 25 U QHS and Prandial 8 U TIDWM  -- POC BG 4 X daily   -- LDSSI PRN  -- Goal -180      Acute on chronic combined systolic (congestive) and diastolic (congestive) heart failure  TTE 12/6/19: Moderately decreased left ventricular systolic function. The estimated ejection fraction is 35%. Grade II (moderate) left ventricular diastolic dysfunction consistent with pseudonormalization. Moderate global hypokinetic wall motion. Normal right ventricular systolic function. Severe left atrial enlargement. Mild-to-moderate mitral regurgitation. Mild to moderate tricuspid regurgitation. The estimated PA systolic pressure is 49 mm Hg.Pulmonary hypertension present. Normal central venous pressure (3 mm Hg).    CXR 1/2/20: Mild interstitial edema with pulmonary vasculature congestion    TTE 1/3/20: Mild left ventricular enlargement. Moderately decreased left ventricular systolic function. The estimated ejection fraction is 35%. Grade II (moderate) left ventricular diastolic dysfunction consistent with pseudonormalization. Septal wall has abnormal motion. Local segmental wall motion abnormalities. Moderate left atrial enlargement. Normal right ventricular systolic function. Mild mitral regurgitation. Mild tricuspid regurgitation. Mild pulmonic regurgitation. Intermediate central  venous pressure (8 mm Hg). The estimated PA systolic pressure is 46 mm Hg. Very small aneurysm in inferoapex visualized on image 47. The quantitatively dervived ejection fraction is 34%. Mild aortic regurgitation. Pulmonary hypertension present.    PLAN:  -- Continue Home Coreg 12.5 mg BID  -- Hold ACEi in setting of soft BP and contrast induced ROSLYN  -- IV lasix 80 mg QD  -- Strict I/Os  -- Daily standing weights  -- Low sodium (<2g) diet with 1500 cc fluid restriction        Mixed hyperlipidemia  Home med:  Atorvastatin 80 mg    Lipid panel 12/16/19: T cholesterol 244, HDL 46, , Triglyceride 135  Lipid panel 1/3/2020: T cholesterol 246 HDL 41 ; Triglycerides 115    PLAN:  -- Continue home statin  -- goal LDL <70     Non-intractable vomiting with nausea  On admission patient having two days of non-bloody non-bilious emesis with poor P.O. Intake    PLAN: - Improved  -- P.O. Phenergan 25 mg PRN  -- EKG daily to assess for QTc. 1/3/20 EKG QTc - 479  1/4/20 EKG Sinus rhythm with marked sinus arrythmia  Minimal voltage criteria for LVH, QT prolongation at 464      Essential hypertension  Home: Lisinopril 40 mg QD, Coreg 12.5 mg BID    PLAN:  -- Hold Lisinopril while BP is soft and patient has contrast induced ROSLYN  -- Goal BP <140/90      Peripheral vascular disease  S/P PTA with stents BLE 2015    CTA Ab/pelvis with run off:  1. Diffuse atherosclerotic disease resulting in stenosis of the origins of the celiac artery, SMA, bilateral accessory renal arteries.  2. There is diffuse atherosclerotic disease in the bilateral lower extremities with bilateral SFA-popliteal artery stent occlusion.  The popliteal arteries reconstitute distally with collateral vessels.  3. Leiomyomatous uterus.        VTE Risk Mitigation (From admission, onward)         Ordered     Place sequential compression device  Until discontinued      01/02/20 1518     IP VTE HIGH RISK PATIENT  Once      01/02/20 1518     heparin 25,000 units  in dextrose 5% 250 mL (100 units/mL) infusion LOW INTENSITY nomogram - [facility]  Continuous     Question:  Heparin Infusion Adjustment (DO NOT MODIFY ANSWER)  Answer:  [email or web address]\epic\Images\Pharmacy\HeparinInfusions\heparin LOW INTENSITY nomogram for [facility] [id number]    01/02/20 1254     heparin 25,000 units in dextrose 5% (100 units/ml) IV bolus from bag - ADDITIONAL PRN BOLUS - 60 units/kg (max bolus 4000 units)  As needed (PRN)     Question:  Heparin Infusion Adjustment (DO NOT MODIFY ANSWER)  Answer:  [email or web address]\epic\Images\Pharmacy\HeparinInfusions\heparin LOW INTENSITY nomogram for [facility] [id number]    01/02/20 1254     heparin 25,000 units in dextrose 5% (100 units/ml) IV bolus from bag - ADDITIONAL PRN BOLUS - 30 units/kg (max bolus 4000 units)  As needed (PRN)     Question:  Heparin Infusion Adjustment (DO NOT MODIFY ANSWER)  Answer:  [email or web address]\epic\Images\Pharmacy\HeparinInfusions\heparin LOW INTENSITY nomogram for [facility] [id number]    01/02/20 1254                Batsheva Torres DO  Department of Hospital Medicine   [facility]

## 2020-01-04 NOTE — PT/OT/SLP PROGRESS
Occupational Therapy      Patient Name:  Suyapa Connelly   MRN:  [id number]    Occupational therapy evaluation orders acknowledged and attempted. Pt not seen 2/2 complaints of chest pain and low BP. Will follow-up as scheduled.      Crista Loo OT  1/4/2020

## 2020-01-04 NOTE — SUBJECTIVE & OBJECTIVE
"Interval HPI:   Overnight events: Remains in [facility]. Chest pain overnight. BG at or below goal with basal insulin but likely still eating through basal insulin. Noted creatinine of 3.7.  Eatin%  - improved today per patient and family.   Nausea: No  Hypoglycemia and intervention: No  Fever: No  TPN and/or TF: No      BP (!) 102/59   Pulse 77   Temp 97.2 °F (36.2 °C)   Resp 16   Ht 5' 4" (1.626 m)   Wt 91.6 kg (201 lb 15.1 oz)   LMP 2015 (Exact Date)   SpO2 95%   Breastfeeding? No   BMI 34.66 kg/m²     Labs Reviewed and Include    Recent Labs   Lab 20  0535      CALCIUM 9.5   ALBUMIN 2.8*   PROT 8.2   *   K 3.5   CO2 27   CL 94*   BUN 47*   CREATININE 3.6*   ALKPHOS 99   ALT 6*   AST 9*   BILITOT 0.5     Lab Results   Component Value Date    WBC 6.62 2020    HGB 14.4 2020    HCT 46.2 2020    MCV 98 2020     2020     Recent Labs   Lab 20  1701   TSH 0.562     Lab Results   Component Value Date    HGBA1C 9.7 (H) 2020       Nutritional status:   Body mass index is 34.66 kg/m².  Lab Results   Component Value Date    ALBUMIN 2.8 (L) 2020    ALBUMIN 2.4 (L) 2020    ALBUMIN 2.4 (L) 2020     No results found for: PREALBUMIN    Estimated Creatinine Clearance: 19.8 mL/min (A) (based on SCr of 3.6 mg/dL (H)).    Accu-Checks  Recent Labs     20  2233 20  0809 20  1326 20  1524 20  1540 20  2003 20  0748 20  1238 20  1726 20  2138   POCTGLUCOSE 328* 154* 119* 57* 153* 197* 119* 111* 87 176*       Current Medications and/or Treatments Impacting Glycemic Control  Immunotherapy:    Immunosuppressants     None        Steroids:   Hormones (From admission, onward)    Start     Stop Route Frequency Ordered    20  melatonin tablet 6 mg      -- Oral Once 20        Pressors:    Autonomic Drugs (From admission, onward)    None        Hyperglycemia/Diabetes " Medications:   Antihyperglycemics (From admission, onward)    Start     Stop Route Frequency Ordered    01/04/20 2100  insulin detemir U-100 pen 25 Units      -- SubQ Nightly 01/04/20 0953    01/04/20 1645  insulin aspart U-100 pen 5 Units      -- SubQ 3 times daily with meals 01/04/20 1629    01/04/20 0846  insulin aspart U-100 pen 0-5 Units      -- SubQ Before meals & nightly PRN 01/04/20 0746

## 2020-01-04 NOTE — NURSING
Pt c/o cp mid chest 10/10 pain scale pt holding the chest and stated when I start eating bite of egg omelettes , v/s taken pt stated sharp pain Dr Torres notified stat EKG , troponin, ntg 0.4 mg sl given o2 at 2LNC will continue to monitor family at bedside

## 2020-01-04 NOTE — NURSING
Dr Eugene here notified troponin 0.188  Pt c/o  Indigestion and burped 2xsee  order made will continue to monitor

## 2020-01-04 NOTE — PROGRESS NOTES
Pharmacist Renal Dose Adjustment Note    Suyapa Connelly is a 54 y.o. female being treated with the medication famotidine    Patient Data:    Vital Signs (Most Recent):  Temp: 97.9 °F (36.6 °C) (01/04/20 0756)  Pulse: 89 (01/04/20 1048)  Resp: 14 (01/04/20 0756)  BP: 111/63 (01/04/20 1034)  SpO2: 98 % (01/04/20 1034) Vital Signs (72h Range):  Temp:  [96 °F (35.6 °C)-99 °F (37.2 °C)]   Pulse:  []   Resp:  [14-22]   BP: ()/()   SpO2:  [93 %-100 %]      Recent Labs   Lab 01/02/20  1950 01/03/20  0401 01/04/20  0629   CREATININE 1.2 1.2 1.7*     Serum creatinine: 1.7 mg/dL (H) 01/04/20 0629  Estimated creatinine clearance: 41.6 mL/min (A)      Famotidine 20mg BID will be adjusted to 20mg daily    Pharmacist's Name: Aleta Paul  Pharmacist's Extension: [phone]

## 2020-01-04 NOTE — ASSESSMENT & PLAN NOTE
CAD S/P MI 2010, Angiogram 12/18/19 mid LAD 90% stenosis, mid OM2 75% stenosis, and Ostial D1 70% stenosis LVDEP 25 mmHg    PLAN:  -- Continue GDMT, ASA 81 mg, Home Coreg 12.5 mg BID, Atorvastatin 80 mg QD

## 2020-01-04 NOTE — PHARMACY MED REC
"Admission Medication Reconciliation - Pharmacy Consult Note    The home medication history was taken by Magda Rodriguez, Pharmacy Technician.  Based on information gathered and subsequent review by the clinical pharmacist, the items below may need attention.     You may go to "Admission" then "Reconcile Home Medications" tabs to review and/or act upon these items.     Potentially problematic discrepancies with current MAR  o Patient IS taking the following which was not ordered upon admit  o Hydroxyzine 50 mg Q 6 hr PRN anxiety  o Lisinopril 40 mg QD  o Torsemide 20 mg QD    Please address this information as you see fit.  Feel free to contact us if you have any questions or require assistance.  Amelia Sanchez  EXT [phone]    .    .            "

## 2020-01-04 NOTE — NURSING
Patient was complaining of weakness, excessive sweating, and fatigue. Checked the patient's temperature which read 97.6 F. Checked the patient's blood glucose which had dropped to 57. Patient's blood glucose prior to scheduled insulin dose was 132. Patient received 125 bolus of D10 for 15 minutes and blood glucose elevated to 153. Next dose of insulin was held due to blood glucose dropping prior.

## 2020-01-04 NOTE — PLAN OF CARE
Pt remained free from falls or injuries this shift. Pt independent in repositioning. No skin breakdown noticed. Pt rested well through the night.  Family at bedside. Heparin infusing at 19u/kg/hr=13.8ml/hr. Last PTT therapeutic. Pt scheduled to have CABG 1/7/20. Pt and family hopeful

## 2020-01-04 NOTE — ASSESSMENT & PLAN NOTE
PLAN:  -- current Cr 1.7  -- gave 500 cc bolus NS - Judicious due to EF 35%  -- Avoid nephrotoxic medications if able  -- decreased lasix from 80 mg BID to QD

## 2020-01-04 NOTE — ASSESSMENT & PLAN NOTE
BG goal 140-180.     BG remains slightly below goal and not taking prandial insulin.     Recommend:   Decrease to Levemir 20 units daily.   Change to novolog 3-5 units with meals with meals pending on Po intake (Administer 3 units if patient eats 25% -  50% and 5 units if patient eats 50% or more).  BG monitoring ac/hs and moderate dose correction scale.

## 2020-01-04 NOTE — NURSING
Paged IM2 that pt was having chest pains/discomfort of 10/10.  Primary team informed and primary nurse Ritesh informed as well.

## 2020-01-04 NOTE — ASSESSMENT & PLAN NOTE
"53 y/o AAF with known medical medical issues of chronic combined systolic and diastolic HF (EF 35% Grade II DD), CAD S/P MI 2010, Angiogram 12/18/19 mid LAD 90% stenosis, mid OM2 75% stenosis, and Ostial D1 70% stenosis LVDEP 25 mmHg, PAD S/P PTA with stents BLE 2015, IDDM (A1c 10.3%), HTN, HLD presenting with HARTMAN and nausea/ vomiting and "heart burn" like chest discomfort similar in characteristic for her last MI in 2010.     EKG - Sinus tachycardia without ST changes  1st Troponin - 0.176  BNP - 1,013    Seen by Cardiology in the ED who suggested ACS work up and CTS consult due to multi-vessel coronary disease  TTE: minimal changes since last echo. CVP 8 mmHg     PLAN:  -- Heparin gtt ACS protocol - for 48 hours stop date 1/4/20  --  mg in ED with 81 mg daily and Plavix load 300 mg X1 Holding Plavix in anticipation of CABG tentatively 1/7/20   -- Lasix 80mg IV BID   -- EKG PRN  -- continuous cardiac monitoring  -- CTS Consulted - tentatively planned for CABG depending on the CTA results and her repeat HbA1C, plan for surgery involving hybrid off pump coronary artery bypass (LIMA-LAD) with postoperative percutaneous coronary intervention to the LCx.  -- Famotidine IV 20 mg BID - for prevention of GI Bleeds in ACS work up  -- High intensity atorvastatin 80 mg QHS  "

## 2020-01-04 NOTE — SUBJECTIVE & OBJECTIVE
Interval History: NAEON.  Patient developed 10/10 right sided non radiating sharp chest pain 1/4/20 after eating. Ordered Troponin - 0.188 (ordered additional one in 4 hours), CXR - Interval improvement of opacity in the right lower lung zone not pneumothorax or widened midiastineum, EKG - No ST changes to rule out MI and gave subliqual nitro X3. Gave a GI cocktail to rule out indigestion. Patient felt relief after burping. BP is soft. Giving 500 cc bolus of NS    Review of Systems   Constitutional: Positive for activity change, fatigue and unexpected weight change. Negative for appetite change, chills, diaphoresis and fever.   HENT: Negative for sinus pressure, sore throat and trouble swallowing.    Eyes: Negative for photophobia and visual disturbance.   Respiratory: Negative for cough, choking, chest tightness, shortness of breath and wheezing.    Cardiovascular: Positive for chest pain. Negative for palpitations and leg swelling.   Gastrointestinal: Positive for nausea. Negative for abdominal distention, abdominal pain, blood in stool, constipation, diarrhea and vomiting.   Endocrine: Negative for polyphagia and polyuria.   Genitourinary: Negative for difficulty urinating, dysuria and flank pain.   Musculoskeletal: Negative for back pain and gait problem.   Skin: Negative for color change and pallor.   Neurological: Negative for tremors, syncope, weakness, light-headedness and headaches.   Psychiatric/Behavioral: Negative for agitation and confusion.     Objective:     Vital Signs (Most Recent):  Temp: 97.2 °F (36.2 °C) (01/04/20 1257)  Pulse: 76 (01/04/20 1302)  Resp: 16 (01/04/20 1257)  BP: (!) 88/53 (01/04/20 1302)  SpO2: 98 % (01/04/20 1257) Vital Signs (24h Range):  Temp:  [96 °F (35.6 °C)-98.2 °F (36.8 °C)] 97.2 °F (36.2 °C)  Pulse:  [76-98] 76  Resp:  [14-18] 16  SpO2:  [93 %-99 %] 98 %  BP: ()/(51-82) 88/53     Weight: 90.2 kg (198 lb 13.7 oz)  Body mass index is 34.13 kg/m².    Intake/Output  Summary (Last 24 hours) at 1/4/2020 1321  Last data filed at 1/4/2020 0400  Gross per 24 hour   Intake 240 ml   Output 2300 ml   Net -2060 ml      Physical Exam   Constitutional: She is oriented to person, place, and time. She appears well-developed and well-nourished. She does not have a sickly appearance. She does not appear ill. No distress.   HENT:   Head: Normocephalic and atraumatic.   Eyes: Conjunctivae and EOM are normal.   Neck: Normal range of motion. Neck supple. JVD (improving) present.   Cardiovascular: Regular rhythm and intact distal pulses.   Pulmonary/Chest: Effort normal. No respiratory distress. She has rales (improving). She exhibits no tenderness.   Abdominal: Soft. Bowel sounds are normal. She exhibits no distension. There is no tenderness. There is no rebound and no guarding.   Musculoskeletal: Normal range of motion. She exhibits no edema or tenderness.   Neurological: She is alert and oriented to person, place, and time. No sensory deficit.   Skin: Skin is warm and dry. She is not diaphoretic.   Psychiatric: She has a normal mood and affect. Her behavior is normal. Judgment and thought content normal.       Significant Labs:   Blood Culture:   Recent Labs   Lab 01/02/20  1700   LABBLOO No Growth to date  No Growth to date  No Growth to date  No Growth to date     CBC:   Recent Labs   Lab 01/02/20  1700 01/03/20  0401 01/04/20  0629   WBC 14.16* 10.24  10.24 6.71   HGB 12.5 12.2  12.2 13.2   HCT 38.6 39.7  39.7 42.3   * 326  326 325     CMP:   Recent Labs   Lab 01/02/20  1950 01/03/20  0401 01/04/20  0629   * 134* 133*   K 4.0 3.8 4.2   CL 99 99 98   CO2 20* 24 24   * 195* 136*   BUN 27* 31* 33*   CREATININE 1.2 1.2 1.7*   CALCIUM 9.1 9.2 9.0   PROT  --  7.5 7.7   ALBUMIN  --  2.4* 2.4*   BILITOT  --  0.6 0.6   ALKPHOS  --  98 93   AST  --  10 12   ALT  --  5* 5*   ANIONGAP 14 11 11   EGFRNONAA 51.7* 51.7* 33.9*     Lipase: No results for input(s): LIPASE in the  last 48 hours.  Magnesium:   Recent Labs   Lab 01/03/20  0401 01/04/20  0629   MG 2.0 2.2     Troponin:   Recent Labs   Lab 01/03/20  0800 01/03/20  1408 01/04/20  1004   TROPONINI 0.181* 0.180* 0.188*       Significant Imaging: I have reviewed and interpreted all pertinent imaging results/findings within the past 24 hours.

## 2020-01-04 NOTE — ASSESSMENT & PLAN NOTE
Home: Lisinopril 40 mg QD, Coreg 12.5 mg BID    PLAN:  -- Hold Lisinopril while BP is soft and patient has contrast induced ROSLYN  -- Goal BP <140/90

## 2020-01-04 NOTE — CARE UPDATE
BG goal 140-180    Remains in [facility]. Chest pain overnight. Heparin infusion. Tentative CABG on 1/7/20. Optimize BG pre-operatively.  BG slightly above goal overnight. FBG slightly below goal. Noted creatinine of 1.7.     Insulin orders changed per primary. Will defer to primary team for final orders. Recommendations  below:  Decrease to Levemir 25 units HS.   Novolog 8 units with meals (basal/ prandial matching- please see previous note). Hypoglycemia yesterday related to patient only eating SF clears and given novolog 7 units.   BG monitoring ac/hs and low dose correction scale.         Discharge planning: TBD     Endocrine to continue to follow.  Please call with any questions or concerns.

## 2020-01-05 PROBLEM — R53.1 WEAKNESS: Status: ACTIVE | Noted: 2020-01-05

## 2020-01-05 LAB
ALBUMIN SERPL BCP-MCNC: 2.8 G/DL (ref 3.5–5.2)
ALBUMIN/CREAT UR: 812.5 UG/MG (ref 0–30)
ALP SERPL-CCNC: 99 U/L (ref 55–135)
ALT SERPL W/O P-5'-P-CCNC: 6 U/L (ref 10–44)
ANION GAP SERPL CALC-SCNC: 13 MMOL/L (ref 8–16)
APTT BLDCRRT: 22.9 SEC (ref 21–32)
AST SERPL-CCNC: 9 U/L (ref 10–40)
BASOPHILS # BLD AUTO: 0.03 K/UL (ref 0–0.2)
BASOPHILS NFR BLD: 0.5 % (ref 0–1.9)
BILIRUB SERPL-MCNC: 0.5 MG/DL (ref 0.1–1)
BUN SERPL-MCNC: 47 MG/DL (ref 6–20)
CALCIUM SERPL-MCNC: 9.5 MG/DL (ref 8.7–10.5)
CHLORIDE SERPL-SCNC: 94 MMOL/L (ref 95–110)
CO2 SERPL-SCNC: 27 MMOL/L (ref 23–29)
CREAT SERPL-MCNC: 3.6 MG/DL (ref 0.5–1.4)
CREAT UR-MCNC: 328 MG/DL (ref 15–325)
DIFFERENTIAL METHOD: ABNORMAL
EOSINOPHIL # BLD AUTO: 0 K/UL (ref 0–0.5)
EOSINOPHIL NFR BLD: 0.5 % (ref 0–8)
ERYTHROCYTE [DISTWIDTH] IN BLOOD BY AUTOMATED COUNT: 12.6 % (ref 11.5–14.5)
EST. GFR  (AFRICAN AMERICAN): 15.8 ML/MIN/1.73 M^2
EST. GFR  (NON AFRICAN AMERICAN): 13.7 ML/MIN/1.73 M^2
GLUCOSE SERPL-MCNC: 103 MG/DL (ref 70–110)
HCT VFR BLD AUTO: 46.2 % (ref 37–48.5)
HGB BLD-MCNC: 14.4 G/DL (ref 12–16)
IMM GRANULOCYTES # BLD AUTO: 0.01 K/UL (ref 0–0.04)
IMM GRANULOCYTES NFR BLD AUTO: 0.2 % (ref 0–0.5)
LYMPHOCYTES # BLD AUTO: 2.3 K/UL (ref 1–4.8)
LYMPHOCYTES NFR BLD: 34.4 % (ref 18–48)
MAGNESIUM SERPL-MCNC: 2.5 MG/DL (ref 1.6–2.6)
MCH RBC QN AUTO: 30.6 PG (ref 27–31)
MCHC RBC AUTO-ENTMCNC: 31.2 G/DL (ref 32–36)
MCV RBC AUTO: 98 FL (ref 82–98)
MICROALBUMIN UR DL<=1MG/L-MCNC: 2665 UG/ML
MONOCYTES # BLD AUTO: 0.6 K/UL (ref 0.3–1)
MONOCYTES NFR BLD: 8.5 % (ref 4–15)
NEUTROPHILS # BLD AUTO: 3.7 K/UL (ref 1.8–7.7)
NEUTROPHILS NFR BLD: 55.9 % (ref 38–73)
NRBC BLD-RTO: 0 /100 WBC
PHOSPHATE SERPL-MCNC: 4.1 MG/DL (ref 2.7–4.5)
PLATELET # BLD AUTO: 290 K/UL (ref 150–350)
PMV BLD AUTO: 10.4 FL (ref 9.2–12.9)
POCT GLUCOSE: 104 MG/DL (ref 70–110)
POCT GLUCOSE: 148 MG/DL (ref 70–110)
POCT GLUCOSE: 173 MG/DL (ref 70–110)
POCT GLUCOSE: 223 MG/DL (ref 70–110)
POTASSIUM SERPL-SCNC: 3.5 MMOL/L (ref 3.5–5.1)
PROT SERPL-MCNC: 8.2 G/DL (ref 6–8.4)
PROT UR-MCNC: 439 MG/DL (ref 0–15)
PROT/CREAT UR: 1.34 MG/G{CREAT} (ref 0–0.2)
RBC # BLD AUTO: 4.71 M/UL (ref 4–5.4)
SODIUM SERPL-SCNC: 134 MMOL/L (ref 136–145)
SODIUM UR-SCNC: 23 MMOL/L (ref 20–250)
UUN UR-MCNC: 334 MG/DL (ref 140–1050)
WBC # BLD AUTO: 6.62 K/UL (ref 3.9–12.7)

## 2020-01-05 PROCEDURE — S0028 INJECTION, FAMOTIDINE, 20 MG: HCPCS | Performed by: STUDENT IN AN ORGANIZED HEALTH CARE EDUCATION/TRAINING PROGRAM

## 2020-01-05 PROCEDURE — 83735 ASSAY OF MAGNESIUM: CPT

## 2020-01-05 PROCEDURE — 84540 ASSAY OF URINE/UREA-N: CPT

## 2020-01-05 PROCEDURE — 82043 UR ALBUMIN QUANTITATIVE: CPT

## 2020-01-05 PROCEDURE — 63600175 PHARM REV CODE 636 W HCPCS: Performed by: STUDENT IN AN ORGANIZED HEALTH CARE EDUCATION/TRAINING PROGRAM

## 2020-01-05 PROCEDURE — 97165 OT EVAL LOW COMPLEX 30 MIN: CPT

## 2020-01-05 PROCEDURE — 63600175 PHARM REV CODE 636 W HCPCS: Performed by: NURSE PRACTITIONER

## 2020-01-05 PROCEDURE — 85025 COMPLETE CBC W/AUTO DIFF WBC: CPT

## 2020-01-05 PROCEDURE — 36415 COLL VENOUS BLD VENIPUNCTURE: CPT

## 2020-01-05 PROCEDURE — 25000003 PHARM REV CODE 250: Performed by: STUDENT IN AN ORGANIZED HEALTH CARE EDUCATION/TRAINING PROGRAM

## 2020-01-05 PROCEDURE — 84300 ASSAY OF URINE SODIUM: CPT

## 2020-01-05 PROCEDURE — 99233 PR SUBSEQUENT HOSPITAL CARE,LEVL III: ICD-10-PCS | Mod: ,,, | Performed by: STUDENT IN AN ORGANIZED HEALTH CARE EDUCATION/TRAINING PROGRAM

## 2020-01-05 PROCEDURE — 99232 SBSQ HOSP IP/OBS MODERATE 35: CPT | Mod: ,,, | Performed by: NURSE PRACTITIONER

## 2020-01-05 PROCEDURE — 94761 N-INVAS EAR/PLS OXIMETRY MLT: CPT

## 2020-01-05 PROCEDURE — 82570 ASSAY OF URINE CREATININE: CPT

## 2020-01-05 PROCEDURE — 85730 THROMBOPLASTIN TIME PARTIAL: CPT

## 2020-01-05 PROCEDURE — 99233 PR SUBSEQUENT HOSPITAL CARE,LEVL III: ICD-10-PCS | Mod: ,,, | Performed by: INTERNAL MEDICINE

## 2020-01-05 PROCEDURE — 11000001 HC ACUTE MED/SURG PRIVATE ROOM

## 2020-01-05 PROCEDURE — 99233 SBSQ HOSP IP/OBS HIGH 50: CPT | Mod: ,,, | Performed by: INTERNAL MEDICINE

## 2020-01-05 PROCEDURE — 80053 COMPREHEN METABOLIC PANEL: CPT

## 2020-01-05 PROCEDURE — 84100 ASSAY OF PHOSPHORUS: CPT

## 2020-01-05 PROCEDURE — 99232 PR SUBSEQUENT HOSPITAL CARE,LEVL II: ICD-10-PCS | Mod: ,,, | Performed by: NURSE PRACTITIONER

## 2020-01-05 PROCEDURE — 99233 SBSQ HOSP IP/OBS HIGH 50: CPT | Mod: ,,, | Performed by: STUDENT IN AN ORGANIZED HEALTH CARE EDUCATION/TRAINING PROGRAM

## 2020-01-05 RX ORDER — TALC
6 POWDER (GRAM) TOPICAL NIGHTLY
Status: COMPLETED | OUTPATIENT
Start: 2020-01-05 | End: 2020-01-05

## 2020-01-05 RX ORDER — INSULIN ASPART 100 [IU]/ML
3-5 INJECTION, SOLUTION INTRAVENOUS; SUBCUTANEOUS
Status: DISCONTINUED | OUTPATIENT
Start: 2020-01-05 | End: 2020-01-07

## 2020-01-05 RX ADMIN — GABAPENTIN 300 MG: 300 CAPSULE ORAL at 09:01

## 2020-01-05 RX ADMIN — ESCITALOPRAM OXALATE 10 MG: 10 TABLET ORAL at 09:01

## 2020-01-05 RX ADMIN — INSULIN ASPART 5 UNITS: 100 INJECTION, SOLUTION INTRAVENOUS; SUBCUTANEOUS at 12:01

## 2020-01-05 RX ADMIN — GABAPENTIN 300 MG: 300 CAPSULE ORAL at 02:01

## 2020-01-05 RX ADMIN — FAMOTIDINE 20 MG: 10 INJECTION, SOLUTION INTRAVENOUS at 09:01

## 2020-01-05 RX ADMIN — ATORVASTATIN CALCIUM 80 MG: 20 TABLET, FILM COATED ORAL at 09:01

## 2020-01-05 RX ADMIN — Medication 250 ML: at 02:01

## 2020-01-05 RX ADMIN — INSULIN ASPART 4 UNITS: 100 INJECTION, SOLUTION INTRAVENOUS; SUBCUTANEOUS at 05:01

## 2020-01-05 RX ADMIN — ASPIRIN 81 MG CHEWABLE TABLET 81 MG: 81 TABLET CHEWABLE at 09:01

## 2020-01-05 RX ADMIN — Medication 6 MG: at 10:01

## 2020-01-05 RX ADMIN — INSULIN ASPART 1 UNITS: 100 INJECTION, SOLUTION INTRAVENOUS; SUBCUTANEOUS at 10:01

## 2020-01-05 NOTE — ASSESSMENT & PLAN NOTE
55 y/o AAF with known medical medical issues of chronic combined systolic and diastolic HF (EF 35% Grade II DD), CAD S/P MI 2010, Angiogram 12/18/19 mid LAD 90% stenosis, mid OM2 75% stenosis, and Ostial D1 70% stenosis LVDEP 25 mmHg, PAD S/P PTA with stents BLE 2015, IDDM who presents with SOB and CHF Exacerbation, admitted for NSTEMI, found to have a ROSLYN on the second day of admission. Urine sediment revealed multiple granular casts with occasional epithelial cells.     Plan/Recommendations:   Most likely due to hypotension vs recent contrast induced nephropathy  - Strict I/O and chart   - daily weights   - no need for RRT  - Creatinine trend: 1.2>>1.7>>3.6  - UA: 3+ Protein, 22 RBC, 2 WBCs  - Urine protein/creatinine ratio, micro albumin/creatinine ratio (ordered)  - urine sediment: multiple granular casts, occasional epithelial cells   - renally dose all medications   - Avoid nephrotoxic medications, NSAIDs, IV contrast, ACE/ARB.  - Will follow closely

## 2020-01-05 NOTE — ASSESSMENT & PLAN NOTE
TTE 12/6/19: Moderately decreased left ventricular systolic function. The estimated ejection fraction is 35%. Grade II (moderate) left ventricular diastolic dysfunction consistent with pseudonormalization. Moderate global hypokinetic wall motion. Normal right ventricular systolic function. Severe left atrial enlargement. Mild-to-moderate mitral regurgitation. Mild to moderate tricuspid regurgitation. The estimated PA systolic pressure is 49 mm Hg.Pulmonary hypertension present. Normal central venous pressure (3 mm Hg).    CXR 1/2/20: Mild interstitial edema with pulmonary vasculature congestion    TTE 1/3/20: Mild left ventricular enlargement. Moderately decreased left ventricular systolic function. The estimated ejection fraction is 35%. Grade II (moderate) left ventricular diastolic dysfunction consistent with pseudonormalization. Septal wall has abnormal motion. Local segmental wall motion abnormalities. Moderate left atrial enlargement. Normal right ventricular systolic function. Mild mitral regurgitation. Mild tricuspid regurgitation. Mild pulmonic regurgitation. Intermediate central venous pressure (8 mm Hg). The estimated PA systolic pressure is 46 mm Hg. Very small aneurysm in inferoapex visualized on image 47. The quantitatively dervived ejection fraction is 34%. Mild aortic regurgitation. Pulmonary hypertension present.    Plan as per primary:   Holding ACE and diuretics due to ROSLYN

## 2020-01-05 NOTE — PLAN OF CARE
Problem: Fall Injury Risk  Goal: Absence of Fall and Fall-Related Injury  1/5/2020 1625 by Kevon Garcia RN  Outcome: Ongoing, Progressing  1/5/2020 1625 by Kevon Garcia RN  Outcome: Ongoing, Progressing     Problem: Fall Injury Risk  Goal: Absence of Fall and Fall-Related Injury  1/5/2020 1625 by Kevon Garcia RN  Outcome: Ongoing, Progressing  1/5/2020 1625 by Kevon Garcia RN  Outcome: Ongoing, Progressing  1/5/2020 1625 by Kevon Garcia RN  Outcome: Ongoing, Progressing     Problem: Adult Inpatient Plan of Care  Goal: Readiness for Transition of Care  1/5/2020 1625 by Kevon Garcia RN  Outcome: Ongoing, Progressing  1/5/2020 1625 by Kevon Garcia RN  Outcome: Ongoing, Progressing  1/5/2020 1625 by Kevon Garcia RN  Outcome: Ongoing, Progressing   Patient is AAOx4. Received 250ml IV bolus. Patient ambulates from bed to bathroom with assist. Insulin dependent. No c/o pain or distress noted. Safety measures maintained.

## 2020-01-05 NOTE — ASSESSMENT & PLAN NOTE
PLAN:  -- current Cr 1. Increased to 3.6  -- gave 500 cc bolus NS 1/4 - Judicious due to EF 35%  -- Avoid nephrotoxic medications if able  -- Hold furosemide   - Urine electrolytes, UAR and retroperitoneal US ordered  - Nephrology consulted

## 2020-01-05 NOTE — ASSESSMENT & PLAN NOTE
"53 y/o AAF with known medical medical issues of chronic combined systolic and diastolic HF (EF 35% Grade II DD), CAD S/P MI 2010, Angiogram 12/18/19 mid LAD 90% stenosis, mid OM2 75% stenosis, and Ostial D1 70% stenosis LVDEP 25 mmHg, PAD S/P PTA with stents BLE 2015, IDDM (A1c 10.3%), HTN, HLD presenting with HARTMAN and nausea/ vomiting and "heart burn" like chest discomfort similar in characteristic for her last MI in 2010.     EKG - Sinus tachycardia without ST changes  1st Troponin - 0.176  BNP - 1,013    Seen by Cardiology in the ED who suggested ACS work up and CTS consult due to multi-vessel coronary disease  TTE: minimal changes since last echo. CVP 8 mmHg     PLAN:  -- Heparin gtt ACS protocol - for 48 hours stop date 1/4/20  --  mg in ED with 81 mg daily and Plavix load 300 mg X1 Holding Plavix in anticipation of CABG tentatively 1/7/20   -- Lasix 80mg IV BID - held   -- EKG PRN  -- continuous cardiac monitoring  -- CTS Consulted - tentatively planned for CABG depending on the CTA results and her repeat HbA1C, plan for surgery involving hybrid off pump coronary artery bypass (LIMA-LAD) with postoperative percutaneous coronary intervention to the LCx -   -- Famotidine IV 20 mg BID - for prevention of GI Bleeds in ACS work up  -- High intensity atorvastatin 80 mg QHS  - CABG tentatively planned for Tuesday 1/7/2020  - c/w ASA, full potency statin.  - ACEI and carvedilol held due to ROSLYN and borderline low BP  - Plavix stopped per CTS recommendations   "

## 2020-01-05 NOTE — ASSESSMENT & PLAN NOTE
Home: Lisinopril 40 mg QD, Coreg 12.5 mg BID    PLAN:  -- Hold anti-HTN while BP is soft and patient has contrast induced ROSLYN  -- Goal BP <140/90

## 2020-01-05 NOTE — CONSULTS
[facility]  Nephrology  Consult Note    Patient Name: Suyapa Connelly  MRN: [id number]  Admission Date: 1/2/2020  Hospital Length of Stay: 3 days  Attending Provider: Jacinto Eugene MD   Primary Care Physician: Erlinda Rahman NP  Principal Problem:Acute coronary syndrome    Inpatient consult to Nephrology  Consult performed by: Norman Haque MD  Consult ordered by: Mary Lou Wolf MD  Reason for consult: oliguric ROSLYN         Subjective:     HPI: 55 y/o AAF with known medical medical issues of chronic combined systolic and diastolic HF (EF 35% Grade II DD), CAD S/P MI 2010, Angiogram 12/18/19 mid LAD 90% stenosis, mid OM2 75% stenosis, and Ostial D1 70% stenosis LVDEP 25 mmHg, PAD S/P PTA with stents BLE 2015, IDDM who presented to the ED with SOB and chest pain, found to have an NSTEMI which was treated medically with asa, heparin gtt. CTA of the lower extremities performed on 01/03 to investigate vasculature useful for CABG. Creatinine 3.6 up from a baseline of 1.2. She reports decreased urine output since the CTA but no increased swelling of the lower extremities. No SOB. She an episode of hypotension on 01/04 with a systolic of 80. She was on Lasix IV BID, last dose on 01/04 at 9:00 am. Nephrology consulted for oliguric ROSLYN.      Past Medical History:   Diagnosis Date    Anxiety     Depression     Diabetes mellitus     type 2    GERD (gastroesophageal reflux disease)     Hyperlipemia     Hypertension     Myocardial infarction 2010    minor-caused by stress per pt.       Past Surgical History:   Procedure Laterality Date    Angiogram - Right Extremity Right 7/9/15    angiogram-left leg  10/6/15    CATHETERIZATION OF BOTH LEFT AND RIGHT HEART N/A 12/18/2019    Procedure: CATHETERIZATION, HEART, BOTH LEFT AND RIGHT;  Surgeon: Que Fernando III, MD;  Location: [facility] CATH LAB;  Service: Cardiology;  Laterality: N/A;    CORONARY ANGIOGRAPHY N/A 12/18/2019    Procedure:  ANGIOGRAM, CORONARY ARTERY;  Surgeon: Que Fernando III, MD;  Location: [facility] CATH LAB;  Service: Cardiology;  Laterality: N/A;       Review of patient's allergies indicates:   Allergen Reactions    Pcn [penicillins]      rash     Current Facility-Administered Medications   Medication Frequency    acetaminophen tablet 650 mg Q4H PRN    aspirin chewable tablet 81 mg Daily    atorvastatin tablet 80 mg Daily    dextrose 10% (D10W) Bolus PRN    dextrose 10% (D10W) Bolus PRN    dextrose 10% (D10W) Bolus PRN    dextrose 10% (D10W) Bolus PRN    escitalopram oxalate tablet 10 mg Daily    famotidine (PF) injection 20 mg Daily    gabapentin capsule 300 mg TID    glucagon (human recombinant) injection 1 mg PRN    glucose chewable tablet 16 g PRN    glucose chewable tablet 24 g PRN    insulin aspart U-100 pen 0-5 Units QID (AC + HS) PRN    insulin aspart U-100 pen 3-5 Units TIDWM    insulin detemir U-100 pen 20 Units QHS    melatonin tablet 6 mg Once    nitroGLYCERIN SL tablet 0.4 mg Q5 Min PRN    promethazine tablet 25 mg Q6H PRN    simethicone 40 mg/0.6 mL drops 20 mg QID PRN    sodium chloride 0.9% flush 10 mL PRN     Family History     None        Tobacco Use    Smoking status: Never Smoker    Smokeless tobacco: Never Used   Substance and Sexual Activity    Alcohol use: No    Drug use: Yes     Types: Marijuana     Comment: used yesterday    Sexual activity: Not on file     Review of Systems   Constitutional: Positive for activity change, fatigue and unexpected weight change. Negative for appetite change, chills, diaphoresis and fever.   HENT: Negative for sinus pressure, sore throat and trouble swallowing.    Eyes: Negative for photophobia and visual disturbance.   Respiratory: Negative for cough, choking, chest tightness, shortness of breath and wheezing.    Cardiovascular: Negative for chest pain, palpitations and leg swelling.   Gastrointestinal: Negative for abdominal distention, abdominal  pain, blood in stool, constipation, diarrhea, nausea and vomiting.   Endocrine: Negative for polyphagia and polyuria.   Genitourinary: Negative for difficulty urinating, dysuria and flank pain.   Musculoskeletal: Negative for back pain and gait problem.   Skin: Negative for color change and pallor.   Neurological: Negative for tremors, syncope, weakness, light-headedness and headaches.   Psychiatric/Behavioral: Negative for agitation and confusion.     Objective:     Vital Signs (Most Recent):  Temp: 97.7 °F (36.5 °C) (01/05/20 1108)  Pulse: 85 (01/05/20 1455)  Resp: 18 (01/05/20 1108)  BP: 95/63 (01/05/20 1108)  SpO2: 95 % (01/05/20 1108)  O2 Device (Oxygen Therapy): room air (01/05/20 0300) Vital Signs (24h Range):  Temp:  [97.2 °F (36.2 °C)-97.7 °F (36.5 °C)] 97.7 °F (36.5 °C)  Pulse:  [74-88] 85  Resp:  [16-18] 18  SpO2:  [90 %-96 %] 95 %  BP: ()/(53-63) 95/63     Weight: 91.6 kg (201 lb 15.1 oz) (01/05/20 0715)  Body mass index is 34.66 kg/m².  Body surface area is 2.03 meters squared.    I/O last 3 completed shifts:  In: 240 [P.O.:240]  Out: 1000 [Urine:1000]    Physical Exam   Constitutional: She is oriented to person, place, and time. She appears well-developed and well-nourished. She does not have a sickly appearance. She does not appear ill. No distress.   HENT:   Head: Normocephalic and atraumatic.   Eyes: Conjunctivae and EOM are normal.   Neck: Normal range of motion. Neck supple. JVD (improving) present.   Cardiovascular: Regular rhythm and intact distal pulses.   Pulmonary/Chest: Effort normal. No respiratory distress. She has rales (improving). She exhibits no tenderness.   Abdominal: Soft. Bowel sounds are normal. She exhibits no distension. There is no tenderness. There is no rebound and no guarding.   Musculoskeletal: Normal range of motion. She exhibits no edema or tenderness.   Neurological: She is alert and oriented to person, place, and time. No sensory deficit.   Skin: Skin is warm and  dry. She is not diaphoretic.   Psychiatric: She has a normal mood and affect. Her behavior is normal. Judgment and thought content normal.       Significant Labs:  CBC:   Recent Labs   Lab 01/05/20  0535   WBC 6.62   RBC 4.71   HGB 14.4   HCT 46.2      MCV 98   MCH 30.6   MCHC 31.2*     CMP:   Recent Labs   Lab 01/05/20  0535      CALCIUM 9.5   ALBUMIN 2.8*   PROT 8.2   *   K 3.5   CO2 27   CL 94*   BUN 47*   CREATININE 3.6*   ALKPHOS 99   ALT 6*   AST 9*   BILITOT 0.5     All labs within the past 24 hours have been reviewed.    Significant Imaging:  Labs: Reviewed    Assessment/Plan:     ROSLYN (acute kidney injury)  53 y/o AAF with known medical medical issues of chronic combined systolic and diastolic HF (EF 35% Grade II DD), CAD S/P MI 2010, Angiogram 12/18/19 mid LAD 90% stenosis, mid OM2 75% stenosis, and Ostial D1 70% stenosis LVDEP 25 mmHg, PAD S/P PTA with stents BLE 2015, IDDM who presents with SOB and CHF Exacerbation, admitted for NSTEMI, found to have a ROSLYN on the second day of admission. Urine sediment revealed multiple granular casts with occasional epithelial cells.     Plan/Recommendations:   Most likely due to hypotension vs recent contrast induced nephropathy  - Strict I/O and chart   - daily weights   - no need for RRT  - Creatinine trend: 1.2>>1.7>>3.6  - UA: 3+ Protein, 22 RBC, 2 WBCs  - Urine protein/creatinine ratio, micro albumin/creatinine ratio (ordered)  - urine sediment: multiple granular casts, occasional epithelial cells   - renally dose all medications   - Avoid nephrotoxic medications, NSAIDs, IV contrast, ACE/ARB.  - Will follow closely       Type 2 diabetes mellitus with hyperglycemia, with long-term current use of insulin  management as per primary    Acute on chronic combined systolic (congestive) and diastolic (congestive) heart failure  TTE 12/6/19: Moderately decreased left ventricular systolic function. The estimated ejection fraction is 35%. Grade II  (moderate) left ventricular diastolic dysfunction consistent with pseudonormalization. Moderate global hypokinetic wall motion. Normal right ventricular systolic function. Severe left atrial enlargement. Mild-to-moderate mitral regurgitation. Mild to moderate tricuspid regurgitation. The estimated PA systolic pressure is 49 mm Hg.Pulmonary hypertension present. Normal central venous pressure (3 mm Hg).    CXR 1/2/20: Mild interstitial edema with pulmonary vasculature congestion    TTE 1/3/20: Mild left ventricular enlargement. Moderately decreased left ventricular systolic function. The estimated ejection fraction is 35%. Grade II (moderate) left ventricular diastolic dysfunction consistent with pseudonormalization. Septal wall has abnormal motion. Local segmental wall motion abnormalities. Moderate left atrial enlargement. Normal right ventricular systolic function. Mild mitral regurgitation. Mild tricuspid regurgitation. Mild pulmonic regurgitation. Intermediate central venous pressure (8 mm Hg). The estimated PA systolic pressure is 46 mm Hg. Very small aneurysm in inferoapex visualized on image 47. The quantitatively dervived ejection fraction is 34%. Mild aortic regurgitation. Pulmonary hypertension present.    Plan as per primary:   Holding ACE and diuretics due to ROSLYN            Thank you for your consult. I will follow-up with patient. Please contact us if you have any additional questions.    Norman Haque MD  Nephrology  [facility]

## 2020-01-05 NOTE — SUBJECTIVE & OBJECTIVE
Past Medical History:   Diagnosis Date    Anxiety     Depression     Diabetes mellitus     type 2    GERD (gastroesophageal reflux disease)     Hyperlipemia     Hypertension     Myocardial infarction 2010    minor-caused by stress per pt.       Past Surgical History:   Procedure Laterality Date    Angiogram - Right Extremity Right 7/9/15    angiogram-left leg  10/6/15    CATHETERIZATION OF BOTH LEFT AND RIGHT HEART N/A 12/18/2019    Procedure: CATHETERIZATION, HEART, BOTH LEFT AND RIGHT;  Surgeon: Que Fernando III, MD;  Location: [facility] CATH LAB;  Service: Cardiology;  Laterality: N/A;    CORONARY ANGIOGRAPHY N/A 12/18/2019    Procedure: ANGIOGRAM, CORONARY ARTERY;  Surgeon: Que Fernando III, MD;  Location: [facility] CATH LAB;  Service: Cardiology;  Laterality: N/A;       Review of patient's allergies indicates:   Allergen Reactions    Pcn [penicillins]      rash     Current Facility-Administered Medications   Medication Frequency    acetaminophen tablet 650 mg Q4H PRN    aspirin chewable tablet 81 mg Daily    atorvastatin tablet 80 mg Daily    dextrose 10% (D10W) Bolus PRN    dextrose 10% (D10W) Bolus PRN    dextrose 10% (D10W) Bolus PRN    dextrose 10% (D10W) Bolus PRN    escitalopram oxalate tablet 10 mg Daily    famotidine (PF) injection 20 mg Daily    gabapentin capsule 300 mg TID    glucagon (human recombinant) injection 1 mg PRN    glucose chewable tablet 16 g PRN    glucose chewable tablet 24 g PRN    insulin aspart U-100 pen 0-5 Units QID (AC + HS) PRN    insulin aspart U-100 pen 3-5 Units TIDWM    insulin detemir U-100 pen 20 Units QHS    melatonin tablet 6 mg Once    nitroGLYCERIN SL tablet 0.4 mg Q5 Min PRN    promethazine tablet 25 mg Q6H PRN    simethicone 40 mg/0.6 mL drops 20 mg QID PRN    sodium chloride 0.9% flush 10 mL PRN     Family History     None        Tobacco Use    Smoking status: Never Smoker    Smokeless tobacco: Never Used   Substance and Sexual  Activity    Alcohol use: No    Drug use: Yes     Types: Marijuana     Comment: used yesterday    Sexual activity: Not on file     Review of Systems   Constitutional: Positive for activity change, fatigue and unexpected weight change. Negative for appetite change, chills, diaphoresis and fever.   HENT: Negative for sinus pressure, sore throat and trouble swallowing.    Eyes: Negative for photophobia and visual disturbance.   Respiratory: Negative for cough, choking, chest tightness, shortness of breath and wheezing.    Cardiovascular: Negative for chest pain, palpitations and leg swelling.   Gastrointestinal: Negative for abdominal distention, abdominal pain, blood in stool, constipation, diarrhea, nausea and vomiting.   Endocrine: Negative for polyphagia and polyuria.   Genitourinary: Negative for difficulty urinating, dysuria and flank pain.   Musculoskeletal: Negative for back pain and gait problem.   Skin: Negative for color change and pallor.   Neurological: Negative for tremors, syncope, weakness, light-headedness and headaches.   Psychiatric/Behavioral: Negative for agitation and confusion.     Objective:     Vital Signs (Most Recent):  Temp: 97.7 °F (36.5 °C) (01/05/20 1108)  Pulse: 85 (01/05/20 1455)  Resp: 18 (01/05/20 1108)  BP: 95/63 (01/05/20 1108)  SpO2: 95 % (01/05/20 1108)  O2 Device (Oxygen Therapy): room air (01/05/20 0300) Vital Signs (24h Range):  Temp:  [97.2 °F (36.2 °C)-97.7 °F (36.5 °C)] 97.7 °F (36.5 °C)  Pulse:  [74-88] 85  Resp:  [16-18] 18  SpO2:  [90 %-96 %] 95 %  BP: ()/(53-63) 95/63     Weight: 91.6 kg (201 lb 15.1 oz) (01/05/20 0715)  Body mass index is 34.66 kg/m².  Body surface area is 2.03 meters squared.    I/O last 3 completed shifts:  In: 240 [P.O.:240]  Out: 1000 [Urine:1000]    Physical Exam   Constitutional: She is oriented to person, place, and time. She appears well-developed and well-nourished. She does not have a sickly appearance. She does not appear ill. No  distress.   HENT:   Head: Normocephalic and atraumatic.   Eyes: Conjunctivae and EOM are normal.   Neck: Normal range of motion. Neck supple. JVD (improving) present.   Cardiovascular: Regular rhythm and intact distal pulses.   Pulmonary/Chest: Effort normal. No respiratory distress. She has rales (improving). She exhibits no tenderness.   Abdominal: Soft. Bowel sounds are normal. She exhibits no distension. There is no tenderness. There is no rebound and no guarding.   Musculoskeletal: Normal range of motion. She exhibits no edema or tenderness.   Neurological: She is alert and oriented to person, place, and time. No sensory deficit.   Skin: Skin is warm and dry. She is not diaphoretic.   Psychiatric: She has a normal mood and affect. Her behavior is normal. Judgment and thought content normal.       Significant Labs:  CBC:   Recent Labs   Lab 01/05/20  0535   WBC 6.62   RBC 4.71   HGB 14.4   HCT 46.2      MCV 98   MCH 30.6   MCHC 31.2*     CMP:   Recent Labs   Lab 01/05/20  0535      CALCIUM 9.5   ALBUMIN 2.8*   PROT 8.2   *   K 3.5   CO2 27   CL 94*   BUN 47*   CREATININE 3.6*   ALKPHOS 99   ALT 6*   AST 9*   BILITOT 0.5     All labs within the past 24 hours have been reviewed.    Significant Imaging:  Labs: Reviewed

## 2020-01-05 NOTE — ASSESSMENT & PLAN NOTE
TTE 12/6/19: Moderately decreased left ventricular systolic function. The estimated ejection fraction is 35%. Grade II (moderate) left ventricular diastolic dysfunction consistent with pseudonormalization. Moderate global hypokinetic wall motion. Normal right ventricular systolic function. Severe left atrial enlargement. Mild-to-moderate mitral regurgitation. Mild to moderate tricuspid regurgitation. The estimated PA systolic pressure is 49 mm Hg.Pulmonary hypertension present. Normal central venous pressure (3 mm Hg).    CXR 1/2/20: Mild interstitial edema with pulmonary vasculature congestion    TTE 1/3/20: Mild left ventricular enlargement. Moderately decreased left ventricular systolic function. The estimated ejection fraction is 35%. Grade II (moderate) left ventricular diastolic dysfunction consistent with pseudonormalization. Septal wall has abnormal motion. Local segmental wall motion abnormalities. Moderate left atrial enlargement. Normal right ventricular systolic function. Mild mitral regurgitation. Mild tricuspid regurgitation. Mild pulmonic regurgitation. Intermediate central venous pressure (8 mm Hg). The estimated PA systolic pressure is 46 mm Hg. Very small aneurysm in inferoapex visualized on image 47. The quantitatively dervived ejection fraction is 34%. Mild aortic regurgitation. Pulmonary hypertension present.    PLAN:  -- Carvedilol due to borderline low BP  -- Hold ACEi in setting of soft BP and contrast induced ROSLYN  -- IV lasix 80 mg QD - Held in the setting of ROSLYN  -- Strict I/Os  -- Daily standing weights  -- Low sodium (<2g) diet with 1500 cc fluid restriction

## 2020-01-05 NOTE — PROGRESS NOTES
"[facility]  Endocrinology  Progress Note    Admit Date: 2020     Reason for Consult: Management of T2DM, Hyperglycemia     Surgical Procedure and Date: n/a    Diabetes diagnosis year:      Home Diabetes Medications:  Lantus 50 units daily and novolog 9 units with meals   Lab Results   Component Value Date    HGBA1C 9.7 (H) 2020         How often checking glucose at home? 1-3 x day   BG readings on regimen: 100-200 typically; sometimes in 300s   Hypoglycemia on the regimen?  Yes occasionally - usually overnight   Missed doses on regimen?  Yes    Diabetes Complications include:     Hyperglycemia, Hypoglycemia  and Diabetic peripheral neuropathy     Complicating diabetes co morbidities:   History of MI and CHF      HPI:   Patient is a 54 y.o. female with a diagnosis of uncontrolled type 2 DM. Also with CHF, PAD, HTN, HLD, and CAD. Patient presented with chest pain and diaphoresis. Also with nauseated, diaphoretic, a mild headache, epigastric pain, with a vague "heart burn" like feeling in her chest. Patient admitted for treatment and further workup. Endocrinology consulted for BG/ DM management.             Interval HPI:   Overnight events: Remains in [facility]. Chest pain overnight. BG at or below goal with basal insulin but likely still eating through basal insulin. Noted creatinine of 3.7.  Eatin%  - improved today per patient and family.   Nausea: No  Hypoglycemia and intervention: No  Fever: No  TPN and/or TF: No      BP (!) 102/59   Pulse 77   Temp 97.2 °F (36.2 °C)   Resp 16   Ht 5' 4" (1.626 m)   Wt 91.6 kg (201 lb 15.1 oz)   LMP 2015 (Exact Date)   SpO2 95%   Breastfeeding? No   BMI 34.66 kg/m²      Labs Reviewed and Include    Recent Labs   Lab 20  0535      CALCIUM 9.5   ALBUMIN 2.8*   PROT 8.2   *   K 3.5   CO2 27   CL 94*   BUN 47*   CREATININE 3.6*   ALKPHOS 99   ALT 6*   AST 9*   BILITOT 0.5     Lab Results   Component Value Date    WBC " 6.62 01/05/2020    HGB 14.4 01/05/2020    HCT 46.2 01/05/2020    MCV 98 01/05/2020     01/05/2020     Recent Labs   Lab 01/02/20  1701   TSH 0.562     Lab Results   Component Value Date    HGBA1C 9.7 (H) 01/03/2020       Nutritional status:   Body mass index is 34.66 kg/m².  Lab Results   Component Value Date    ALBUMIN 2.8 (L) 01/05/2020    ALBUMIN 2.4 (L) 01/04/2020    ALBUMIN 2.4 (L) 01/03/2020     No results found for: PREALBUMIN    Estimated Creatinine Clearance: 19.8 mL/min (A) (based on SCr of 3.6 mg/dL (H)).    Accu-Checks  Recent Labs     01/02/20  2233 01/03/20  0809 01/03/20  1326 01/03/20  1524 01/03/20  1540 01/03/20  2003 01/04/20  0748 01/04/20  1238 01/04/20  1726 01/04/20  2138   POCTGLUCOSE 328* 154* 119* 57* 153* 197* 119* 111* 87 176*       Current Medications and/or Treatments Impacting Glycemic Control  Immunotherapy:    Immunosuppressants     None        Steroids:   Hormones (From admission, onward)    Start     Stop Route Frequency Ordered    01/04/20 2137  melatonin tablet 6 mg      -- Oral Once 01/04/20 2137        Pressors:    Autonomic Drugs (From admission, onward)    None        Hyperglycemia/Diabetes Medications:   Antihyperglycemics (From admission, onward)    Start     Stop Route Frequency Ordered    01/04/20 2100  insulin detemir U-100 pen 25 Units      -- SubQ Nightly 01/04/20 0953    01/04/20 1645  insulin aspart U-100 pen 5 Units      -- SubQ 3 times daily with meals 01/04/20 1629    01/04/20 0846  insulin aspart U-100 pen 0-5 Units      -- SubQ Before meals & nightly PRN 01/04/20 0746          ASSESSMENT and PLAN    * Acute coronary syndrome  Managed per primary.       Type 2 diabetes mellitus with hyperglycemia, with long-term current use of insulin  BG goal 140-180.     BG remains slightly below goal and not taking prandial insulin.     Recommend:   Decrease to Levemir 20 units daily; FBG below goal and remained below goal when patient was eating yesterday).   Change to  novolog 3-5 units with meals with meals pending on PO intake (Administer 3 units if patient eats 25% -  50% and 5 units if patient eats 50% or more). Patient has not received prandial coverage since hypoglycemia; likely only with hypoglycemia related to being on SF clears at this time. Encouraged prandial coverage even with normal BG to patient and family now that patient's diet has advanced.   BG monitoring ac/hs and change to low dose correction scale give kidney function.       CAD (coronary artery disease)  Optimize glucose control and  avoid hypoglycemia        Acute on chronic combined systolic (congestive) and diastolic (congestive) heart failure  Optimize glucose control and  avoid hypoglycemia              Fely Puga, NP  Endocrinology  [facility]

## 2020-01-05 NOTE — ASSESSMENT & PLAN NOTE
A1c 12/2019 - 10.3% 1/3/2020 - 9.7%    BG on admission was 347. Patient reports not taking her insulin for 2 days due to low P.O. Intake  Home regimen: Aspart 9 U WM + Lantus 50 U QHS    PLAN:  -- Per Endocrine recommendation; appreciate recs:  Levemir 20 units daily and novolog 3-5 units with meals with meals pending on PO intake   -- POC BG 4 X daily   -- LDSSI PRN  -- Goal -180

## 2020-01-05 NOTE — ASSESSMENT & PLAN NOTE
BG goal 140-180.     BG remains slightly below goal and not taking prandial insulin.     Recommend:   Decrease to Levemir 16 units daily; FBG below goal.  increase novolog 4-6 units with meals with meals pending on PO intake (Administer 4 units if patient eats 25% -  50% and 6 units if patient eats 50% or more). Patient continues with prandial excursions.   BG monitoring ac/hs and continue low dose correction scale give kidney function.

## 2020-01-05 NOTE — PLAN OF CARE
Problem: Fall Injury Risk  Goal: Absence of Fall and Fall-Related Injury  Outcome: Ongoing, Progressing     Problem: Adult Inpatient Plan of Care  Goal: Optimal Comfort and Wellbeing  Outcome: Ongoing, Progressing     Problem: Diabetes Comorbidity  Goal: Blood Glucose Level Within Desired Range  Outcome: Ongoing, Progressing     Problem: Adjustment to Illness (Acute Coronary Syndrome)  Goal: Optimal Adaptation to Illness  Outcome: Ongoing, Progressing     Problem: Pain (Acute Coronary Syndrome)  Goal: Absence of Cardiac-Related Pain  Outcome: Ongoing, Progressing     Problem: Tissue Perfusion (Acute Coronary Syndrome)  Goal: Adequate Tissue Perfusion  Outcome: Ongoing, Progressing     PT is AAOX4, no acute changes noted during shift, pt is up with min asst, and repositions self well in bed q2, no skin breakdown noted, hourly rounds made during shift,  VSS. No falls noted. Fall precautions remain. Pain assessed. No pain noted. Pt resting comfortably in bed. Call light in reach. Will continue to monitor.

## 2020-01-05 NOTE — ASSESSMENT & PLAN NOTE
CAD S/P MI 2010, Angiogram 12/18/19 mid LAD 90% stenosis, mid OM2 75% stenosis, and Ostial D1 70% stenosis LVDEP 25 mmHg    PLAN:  - CABG tentatively planned for Tuesday 1/7/2020  - c/w ASA, full potency statin   - ACEI and carvedilol held due to ROSLYN and borderline low BP  - Plavix stopped per CTS recommendations

## 2020-01-05 NOTE — SUBJECTIVE & OBJECTIVE
Interval History: NAEON.  Patient denies chest pain. She developed ROSLYN on CKD which is most likely multifactorial. Urine electrolytes, UAR and retroperitoneal US ordered. Nephrology consulted for further eval    Review of Systems   Constitutional: Positive for activity change, fatigue and unexpected weight change. Negative for appetite change, chills, diaphoresis and fever.   HENT: Negative for sinus pressure, sore throat and trouble swallowing.    Eyes: Negative for photophobia and visual disturbance.   Respiratory: Negative for cough, choking, chest tightness, shortness of breath and wheezing.    Cardiovascular: Negative for chest pain, palpitations and leg swelling.   Gastrointestinal: Negative for abdominal distention, abdominal pain, blood in stool, constipation, diarrhea, nausea and vomiting.   Endocrine: Negative for polyphagia and polyuria.   Genitourinary: Negative for difficulty urinating, dysuria and flank pain.   Musculoskeletal: Negative for back pain and gait problem.   Skin: Negative for color change and pallor.   Neurological: Negative for tremors, syncope, weakness, light-headedness and headaches.   Psychiatric/Behavioral: Negative for agitation and confusion.     Objective:     Vital Signs (Most Recent):  Temp: 97.7 °F (36.5 °C) (01/05/20 1108)  Pulse: 88 (01/05/20 1108)  Resp: 18 (01/05/20 1108)  BP: 95/63 (01/05/20 1108)  SpO2: 95 % (01/05/20 1108) Vital Signs (24h Range):  Temp:  [97.2 °F (36.2 °C)-97.7 °F (36.5 °C)] 97.7 °F (36.5 °C)  Pulse:  [74-88] 88  Resp:  [16-18] 18  SpO2:  [90 %-98 %] 95 %  BP: ()/(51-63) 95/63     Weight: 91.6 kg (201 lb 15.1 oz)  Body mass index is 34.66 kg/m².    Intake/Output Summary (Last 24 hours) at 1/5/2020 1244  Last data filed at 1/5/2020 0715  Gross per 24 hour   Intake 120 ml   Output 400 ml   Net -280 ml      Physical Exam   Constitutional: She is oriented to person, place, and time. She appears well-developed and well-nourished. She does not have a  sickly appearance. She does not appear ill. No distress.   HENT:   Head: Normocephalic and atraumatic.   Eyes: Conjunctivae and EOM are normal.   Neck: Normal range of motion. Neck supple. JVD (improving) present.   Cardiovascular: Regular rhythm and intact distal pulses.   Pulmonary/Chest: Effort normal. No respiratory distress. She has rales (improving). She exhibits no tenderness.   Abdominal: Soft. Bowel sounds are normal. She exhibits no distension. There is no tenderness. There is no rebound and no guarding.   Musculoskeletal: Normal range of motion. She exhibits no edema or tenderness.   Neurological: She is alert and oriented to person, place, and time. No sensory deficit.   Skin: Skin is warm and dry. She is not diaphoretic.   Psychiatric: She has a normal mood and affect. Her behavior is normal. Judgment and thought content normal.       Significant Labs:   Blood Culture:   No results for input(s): LABBLOO in the last 48 hours.  CBC:   Recent Labs   Lab 01/04/20  0629 01/05/20  0535   WBC 6.71 6.62   HGB 13.2 14.4   HCT 42.3 46.2    290     CMP:   Recent Labs   Lab 01/04/20  0629 01/05/20  0535   * 134*   K 4.2 3.5   CL 98 94*   CO2 24 27   * 103   BUN 33* 47*   CREATININE 1.7* 3.6*   CALCIUM 9.0 9.5   PROT 7.7 8.2   ALBUMIN 2.4* 2.8*   BILITOT 0.6 0.5   ALKPHOS 93 99   AST 12 9*   ALT 5* 6*   ANIONGAP 11 13   EGFRNONAA 33.9* 13.7*     Lipase: No results for input(s): LIPASE in the last 48 hours.  Magnesium:   Recent Labs   Lab 01/04/20  0629 01/05/20  0535   MG 2.2 2.5     Troponin:   Recent Labs   Lab 01/03/20  1408 01/04/20  1004 01/04/20  1510   TROPONINI 0.180* 0.188* 0.169*       Significant Imaging: I have reviewed and interpreted all pertinent imaging results/findings within the past 24 hours.

## 2020-01-05 NOTE — HPI
55 y/o AAF with known medical medical issues of chronic combined systolic and diastolic HF (EF 35% Grade II DD), CAD S/P MI 2010, Angiogram 12/18/19 mid LAD 90% stenosis, mid OM2 75% stenosis, and Ostial D1 70% stenosis LVDEP 25 mmHg, PAD S/P PTA with stents BLE 2015, IDDM who presented to the ED with SOB and chest pain, found to have an NSTEMI which was treated medically with asa, heparin gtt. CTA of the lower extremities performed on 01/03 to investigate vasculature useful for CABG. Creatinine 3.6 up from a baseline of 1.2. She reports decreased urine output since the CTA but no increased swelling of the lower extremities. No SOB. She an episode of hypotension on 01/04 with a systolic of 80. She was on Lasix IV BID, last dose on 01/04 at 9:00 am. Nephrology consulted for oliguric ROSLYN.

## 2020-01-05 NOTE — PROGRESS NOTES
[facility] Hospital Medicine  Progress Note    Patient Name: Suyapa Connelly  MRN: [id number]  Patient Class: IP- Inpatient   Admission Date: 1/2/2020  Length of Stay: 3 days  Attending Physician: Jacnito Eugene MD  Primary Care Provider: Erlinda Rahman NP    [facility] Medicine Team: [facility] MED 2 Mary Lou Wolf MD    Subjective:     Principal Problem:Acute coronary syndrome        HPI:  Ms. Connelly is a 53 y/o AAF with known medical medical issues of chronic combined systolic and diastolic HF (EF 35% Grade II DD), CAD S/P MI 2010, Angiogram 12/18/19 mid LAD 90% stenosis, mid OM2 75% stenosis, and Ostial D1 70% stenosis LVDEP 25 mmHg, PAD S/P PTA with stents BLE 2015, IDDM (A1c 10.3%), HTN, HLD, GERD, Anxiety/ Depression. Patient present with a 2 day history of nausea and with multiple episodes of non-bilious non-bloody emesis and couple of episodes of watery non-bloody diarrhea. Patient reports initially feeling HARTMAN without associated chest pain/tightness for the past week before progressively worsening to the GI symptoms described. She reports having a 7 lb weight gain (dry weight unknown) and only able to walk less than 20-30 ft before feeling short of breath. She reports that 5-10 minute rest resolves her dyspnea and she never has shortness of breath at rest. She reports feeling similar symptoms to this when she was admitted to the hospital on 12/6/19 for ADHF. On Monday 12/30 she doubled her home torsemide dosage to help reduce the excess water weight but reports that it did not help much. When he GI symptoms developed on Tuesday, she reports being unable to tolerate anything P.O. and subsequently stopped taking all of her home medications including her home insulin. What prompted her to come into the hospital was a fluttering feeling in her chest associated with some diaphoresis. She reports not remembering the symptoms she felt when she was having her MI in 2010 but  at  "bedside reported that she had nausea and vomiting leading up to her MI and she collapsed and lost consciousness and then later once admitted to the hospital was told she had had a heart attack. Currently the patient reports feeling nauseated, diaphoretic, a mild headache, epigastric pain, with a vague "heart burn" like feeling in her chest. She denies light headedness, chest tightness, dyspnea, palpitations, dysuria, constipation, edema. She denies any recent sick contacts and fevers at home.    In the ED, , EKG showed sinus tachycardia without ST changes, 1st troponin 0.176, BNP 1,013, CXR - mild interstitial edema with pulmonary vascular congestion. She was seen by cardiology who suggested an ACS work up due to her recent angiogram showing vessel blockages and her being a diabetic with similar vague symptoms from last MI. The also suggested for CTS to be consulted to assess for CABG. Patient was admitted to hospital medicine Team 2 for further management and work up.      Overview/Hospital Course:  53 y/o AAF with known medical medical issues of chronic combined systolic and diastolic HF (EF 35% Grade II DD), CAD S/P MI 2010, Angiogram 12/18/19 mid LAD 90% stenosis, mid OM2 75% stenosis, and Ostial D1 70% stenosis LVDEP 25 mmHg, PAD S/P PTA with stents BLE 2015, IDDM (A1c 10.3%), HTN, HLD, GERD, Anxiety/ Depression. Worked up for NSTEMI/ACS due to Chest discomfort nonspecific ST changes on EKG and elevated troponin. Patient had an MI in 2002 with similar prodrome/symptoms leading up to the acute coronary event. Patient tentatively planned for CABG depending on the CTA results and her repeat HbA1C for 1/7/20, plan for surgery involving hybrid off pump coronary artery bypass (LIMA-LAD) with postoperative percutaneous coronary intervention to the LCx. Will hold Plavix in anticipation of surgery and will start heparin gtt after 48 hour per Cards recommendations. Patient developed 10/10 right sided non radiating " sharp chest pain 1/4/20 after eating. Ordered Troponin - 0.188 -->0.169, CXR - Interval improvement of opacity in the right lower lung zone not pneumothorax or widened midiastineum, EKG - No ST changes to rule out MI and gave subliqual nitro X3. Gave a GI cocktail to rule out indigestion. Patient felt relief after burping. Endocrinology following for optimal diabetes management in anticipation of surgery per CTS request. 1/5/2020: Patient denies chest pain. She developed ROSLYN on CKD which is most likely multifactorial. Urine electrolytes, UAR and retroperitoneal US ordered. Nephrology consulted for further eval    Interval History: NAEON.  Patient denies chest pain. She developed ROSLYN on CKD which is most likely multifactorial. Urine electrolytes, UAR and retroperitoneal US ordered. Nephrology consulted for further eval    Review of Systems   Constitutional: Positive for activity change, fatigue and unexpected weight change. Negative for appetite change, chills, diaphoresis and fever.   HENT: Negative for sinus pressure, sore throat and trouble swallowing.    Eyes: Negative for photophobia and visual disturbance.   Respiratory: Negative for cough, choking, chest tightness, shortness of breath and wheezing.    Cardiovascular: Negative for chest pain, palpitations and leg swelling.   Gastrointestinal: Negative for abdominal distention, abdominal pain, blood in stool, constipation, diarrhea, nausea and vomiting.   Endocrine: Negative for polyphagia and polyuria.   Genitourinary: Negative for difficulty urinating, dysuria and flank pain.   Musculoskeletal: Negative for back pain and gait problem.   Skin: Negative for color change and pallor.   Neurological: Negative for tremors, syncope, weakness, light-headedness and headaches.   Psychiatric/Behavioral: Negative for agitation and confusion.     Objective:     Vital Signs (Most Recent):  Temp: 97.7 °F (36.5 °C) (01/05/20 1108)  Pulse: 88 (01/05/20 1108)  Resp: 18  (01/05/20 1108)  BP: 95/63 (01/05/20 1108)  SpO2: 95 % (01/05/20 1108) Vital Signs (24h Range):  Temp:  [97.2 °F (36.2 °C)-97.7 °F (36.5 °C)] 97.7 °F (36.5 °C)  Pulse:  [74-88] 88  Resp:  [16-18] 18  SpO2:  [90 %-98 %] 95 %  BP: ()/(51-63) 95/63     Weight: 91.6 kg (201 lb 15.1 oz)  Body mass index is 34.66 kg/m².    Intake/Output Summary (Last 24 hours) at 1/5/2020 1244  Last data filed at 1/5/2020 0715  Gross per 24 hour   Intake 120 ml   Output 400 ml   Net -280 ml      Physical Exam   Constitutional: She is oriented to person, place, and time. She appears well-developed and well-nourished. She does not have a sickly appearance. She does not appear ill. No distress.   HENT:   Head: Normocephalic and atraumatic.   Eyes: Conjunctivae and EOM are normal.   Neck: Normal range of motion. Neck supple. JVD (improving) present.   Cardiovascular: Regular rhythm and intact distal pulses.   Pulmonary/Chest: Effort normal. No respiratory distress. She has rales (improving). She exhibits no tenderness.   Abdominal: Soft. Bowel sounds are normal. She exhibits no distension. There is no tenderness. There is no rebound and no guarding.   Musculoskeletal: Normal range of motion. She exhibits no edema or tenderness.   Neurological: She is alert and oriented to person, place, and time. No sensory deficit.   Skin: Skin is warm and dry. She is not diaphoretic.   Psychiatric: She has a normal mood and affect. Her behavior is normal. Judgment and thought content normal.       Significant Labs:   Blood Culture:   No results for input(s): LABBLOO in the last 48 hours.  CBC:   Recent Labs   Lab 01/04/20  0629 01/05/20  0535   WBC 6.71 6.62   HGB 13.2 14.4   HCT 42.3 46.2    290     CMP:   Recent Labs   Lab 01/04/20  0629 01/05/20  0535   * 134*   K 4.2 3.5   CL 98 94*   CO2 24 27   * 103   BUN 33* 47*   CREATININE 1.7* 3.6*   CALCIUM 9.0 9.5   PROT 7.7 8.2   ALBUMIN 2.4* 2.8*   BILITOT 0.6 0.5   ALKPHOS 93 99  "  AST 12 9*   ALT 5* 6*   ANIONGAP 11 13   EGFRNONAA 33.9* 13.7*     Lipase: No results for input(s): LIPASE in the last 48 hours.  Magnesium:   Recent Labs   Lab 01/04/20  0629 01/05/20  0535   MG 2.2 2.5     Troponin:   Recent Labs   Lab 01/03/20  1408 01/04/20  1004 01/04/20  1510   TROPONINI 0.180* 0.188* 0.169*       Significant Imaging: I have reviewed and interpreted all pertinent imaging results/findings within the past 24 hours.      Assessment/Plan:      * Acute coronary syndrome  55 y/o AAF with known medical medical issues of chronic combined systolic and diastolic HF (EF 35% Grade II DD), CAD S/P MI 2010, Angiogram 12/18/19 mid LAD 90% stenosis, mid OM2 75% stenosis, and Ostial D1 70% stenosis LVDEP 25 mmHg, PAD S/P PTA with stents BLE 2015, IDDM (A1c 10.3%), HTN, HLD presenting with HARTMAN and nausea/ vomiting and "heart burn" like chest discomfort similar in characteristic for her last MI in 2010.     EKG - Sinus tachycardia without ST changes  1st Troponin - 0.176  BNP - 1,013    Seen by Cardiology in the ED who suggested ACS work up and CTS consult due to multi-vessel coronary disease  TTE: minimal changes since last echo. CVP 8 mmHg     PLAN:  -- Heparin gtt ACS protocol - for 48 hours stop date 1/4/20  --  mg in ED with 81 mg daily and Plavix load 300 mg X1 Holding Plavix in anticipation of CABG tentatively 1/7/20   -- Lasix 80mg IV BID - held   -- EKG PRN  -- continuous cardiac monitoring  -- CTS Consulted - tentatively planned for CABG depending on the CTA results and her repeat HbA1C, plan for surgery involving hybrid off pump coronary artery bypass (LIMA-LAD) with postoperative percutaneous coronary intervention to the LCx -   -- Famotidine IV 20 mg BID - for prevention of GI Bleeds in ACS work up  -- High intensity atorvastatin 80 mg QHS  - CABG tentatively planned for Tuesday 1/7/2020  - c/w ASA, full potency statin.  - ACEI and carvedilol held due to ROSLYN and borderline low BP  - Plavix " stopped per CTS recommendations     Weakness  PT/OT      Contrast dye induced nephropathy  PLAN:  -- current Cr 1. Increased to 3.6  -- gave 500 cc bolus NS 1/4 - Judicious due to EF 35%  -- Avoid nephrotoxic medications if able  -- Hold furosemide   - Urine electrolytes, UAR and retroperitoneal US ordered  - Nephrology consulted      CAD (coronary artery disease)  CAD S/P MI 2010, Angiogram 12/18/19 mid LAD 90% stenosis, mid OM2 75% stenosis, and Ostial D1 70% stenosis LVDEP 25 mmHg    PLAN:  - CABG tentatively planned for Tuesday 1/7/2020  - c/w ASA, full potency statin   - ACEI and carvedilol held due to ROSLYN and borderline low BP  - Plavix stopped per CTS recommendations     Type 2 diabetes mellitus with hyperglycemia, with long-term current use of insulin  A1c 12/2019 - 10.3% 1/3/2020 - 9.7%    BG on admission was 347. Patient reports not taking her insulin for 2 days due to low P.O. Intake  Home regimen: Aspart 9 U WM + Lantus 50 U QHS    PLAN:  -- Per Endocrine recommendation; appreciate recs:  Levemir 20 units daily and novolog 3-5 units with meals with meals pending on PO intake   -- POC BG 4 X daily   -- LDSSI PRN  -- Goal -180      Acute on chronic combined systolic (congestive) and diastolic (congestive) heart failure  TTE 12/6/19: Moderately decreased left ventricular systolic function. The estimated ejection fraction is 35%. Grade II (moderate) left ventricular diastolic dysfunction consistent with pseudonormalization. Moderate global hypokinetic wall motion. Normal right ventricular systolic function. Severe left atrial enlargement. Mild-to-moderate mitral regurgitation. Mild to moderate tricuspid regurgitation. The estimated PA systolic pressure is 49 mm Hg.Pulmonary hypertension present. Normal central venous pressure (3 mm Hg).    CXR 1/2/20: Mild interstitial edema with pulmonary vasculature congestion    TTE 1/3/20: Mild left ventricular enlargement. Moderately decreased left ventricular  systolic function. The estimated ejection fraction is 35%. Grade II (moderate) left ventricular diastolic dysfunction consistent with pseudonormalization. Septal wall has abnormal motion. Local segmental wall motion abnormalities. Moderate left atrial enlargement. Normal right ventricular systolic function. Mild mitral regurgitation. Mild tricuspid regurgitation. Mild pulmonic regurgitation. Intermediate central venous pressure (8 mm Hg). The estimated PA systolic pressure is 46 mm Hg. Very small aneurysm in inferoapex visualized on image 47. The quantitatively dervived ejection fraction is 34%. Mild aortic regurgitation. Pulmonary hypertension present.    PLAN:  -- Carvedilol due to borderline low BP  -- Hold ACEi in setting of soft BP and contrast induced ROSLYN  -- IV lasix 80 mg QD - Held in the setting of ROSYLN  -- Strict I/Os  -- Daily standing weights  -- Low sodium (<2g) diet with 1500 cc fluid restriction        Mixed hyperlipidemia  Home med:  Atorvastatin 80 mg    Lipid panel 12/16/19: T cholesterol 244, HDL 46, , Triglyceride 135  Lipid panel 1/3/2020: T cholesterol 246 HDL 41 ; Triglycerides 115    PLAN:  -- Continue home statin  -- goal LDL <70     Non-intractable vomiting with nausea  On admission patient having two days of non-bloody non-bilious emesis with poor P.O. Intake    PLAN: - Improved  -- P.O. Phenergan 25 mg PRN  -- EKG daily to assess for QTc. 1/3/20 EKG QTc - 479  1/4/20 EKG Sinus rhythm with marked sinus arrythmia  Minimal voltage criteria for LVH, QT prolongation at 464      Essential hypertension  Home: Lisinopril 40 mg QD, Coreg 12.5 mg BID    PLAN:  -- Hold anti-HTN while BP is soft and patient has contrast induced ROSLYN  -- Goal BP <140/90      Peripheral vascular disease  S/P PTA with stents BLE 2015    CTA Ab/pelvis with run off:  1. Diffuse atherosclerotic disease resulting in stenosis of the origins of the celiac artery, SMA, bilateral accessory renal arteries.  2. There  is diffuse atherosclerotic disease in the bilateral lower extremities with bilateral SFA-popliteal artery stent occlusion.  The popliteal arteries reconstitute distally with collateral vessels.  3. Leiomyomatous uterus.        VTE Risk Mitigation (From admission, onward)         Ordered     Place sequential compression device  Until discontinued      01/02/20 1518     IP VTE HIGH RISK PATIENT  Once      01/02/20 1518                      Mary Lou Wolf MD  Department of Hospital Medicine   [facility]

## 2020-01-06 ENCOUNTER — ANESTHESIA EVENT (OUTPATIENT)
Dept: SURGERY | Facility: HOSPITAL | Age: 55
DRG: 235 | End: 2020-01-06
Payer: MEDICAID

## 2020-01-06 LAB
ALBUMIN SERPL BCP-MCNC: 2.3 G/DL (ref 3.5–5.2)
ALP SERPL-CCNC: 82 U/L (ref 55–135)
ALT SERPL W/O P-5'-P-CCNC: 6 U/L (ref 10–44)
ANION GAP SERPL CALC-SCNC: 13 MMOL/L (ref 8–16)
AST SERPL-CCNC: 9 U/L (ref 10–40)
BASOPHILS # BLD AUTO: 0.06 K/UL (ref 0–0.2)
BASOPHILS NFR BLD: 0.9 % (ref 0–1.9)
BILIRUB SERPL-MCNC: 0.3 MG/DL (ref 0.1–1)
BUN SERPL-MCNC: 65 MG/DL (ref 6–20)
CALCIUM SERPL-MCNC: 8.1 MG/DL (ref 8.7–10.5)
CHLORIDE SERPL-SCNC: 99 MMOL/L (ref 95–110)
CO2 SERPL-SCNC: 25 MMOL/L (ref 23–29)
CREAT SERPL-MCNC: 4.2 MG/DL (ref 0.5–1.4)
DIFFERENTIAL METHOD: ABNORMAL
EOSINOPHIL # BLD AUTO: 0.2 K/UL (ref 0–0.5)
EOSINOPHIL NFR BLD: 2.4 % (ref 0–8)
ERYTHROCYTE [DISTWIDTH] IN BLOOD BY AUTOMATED COUNT: 12.5 % (ref 11.5–14.5)
EST. GFR  (AFRICAN AMERICAN): 13.1 ML/MIN/1.73 M^2
EST. GFR  (NON AFRICAN AMERICAN): 11.4 ML/MIN/1.73 M^2
GLUCOSE SERPL-MCNC: 87 MG/DL (ref 70–110)
HCT VFR BLD AUTO: 35 % (ref 37–48.5)
HGB BLD-MCNC: 10.9 G/DL (ref 12–16)
IMM GRANULOCYTES # BLD AUTO: 0.01 K/UL (ref 0–0.04)
IMM GRANULOCYTES NFR BLD AUTO: 0.1 % (ref 0–0.5)
LYMPHOCYTES # BLD AUTO: 2.2 K/UL (ref 1–4.8)
LYMPHOCYTES NFR BLD: 32.5 % (ref 18–48)
MAGNESIUM SERPL-MCNC: 2.5 MG/DL (ref 1.6–2.6)
MCH RBC QN AUTO: 30.4 PG (ref 27–31)
MCHC RBC AUTO-ENTMCNC: 31.1 G/DL (ref 32–36)
MCV RBC AUTO: 98 FL (ref 82–98)
MONOCYTES # BLD AUTO: 0.8 K/UL (ref 0.3–1)
MONOCYTES NFR BLD: 11.8 % (ref 4–15)
NEUTROPHILS # BLD AUTO: 3.5 K/UL (ref 1.8–7.7)
NEUTROPHILS NFR BLD: 52.3 % (ref 38–73)
NRBC BLD-RTO: 0 /100 WBC
PHOSPHATE SERPL-MCNC: 5.7 MG/DL (ref 2.7–4.5)
PLATELET # BLD AUTO: 242 K/UL (ref 150–350)
PMV BLD AUTO: 10.8 FL (ref 9.2–12.9)
POCT GLUCOSE: 158 MG/DL (ref 70–110)
POCT GLUCOSE: 171 MG/DL (ref 70–110)
POCT GLUCOSE: 193 MG/DL (ref 70–110)
POCT GLUCOSE: 203 MG/DL (ref 70–110)
POCT GLUCOSE: 93 MG/DL (ref 70–110)
POTASSIUM SERPL-SCNC: 3.4 MMOL/L (ref 3.5–5.1)
PROT SERPL-MCNC: 6.2 G/DL (ref 6–8.4)
RBC # BLD AUTO: 3.58 M/UL (ref 4–5.4)
SODIUM SERPL-SCNC: 137 MMOL/L (ref 136–145)
TROPONIN I SERPL DL<=0.01 NG/ML-MCNC: 0.03 NG/ML (ref 0–0.03)
WBC # BLD AUTO: 6.67 K/UL (ref 3.9–12.7)

## 2020-01-06 PROCEDURE — 11000001 HC ACUTE MED/SURG PRIVATE ROOM

## 2020-01-06 PROCEDURE — 83735 ASSAY OF MAGNESIUM: CPT

## 2020-01-06 PROCEDURE — 99232 PR SUBSEQUENT HOSPITAL CARE,LEVL II: ICD-10-PCS | Mod: ,,, | Performed by: INTERNAL MEDICINE

## 2020-01-06 PROCEDURE — 99232 SBSQ HOSP IP/OBS MODERATE 35: CPT | Mod: ,,, | Performed by: INTERNAL MEDICINE

## 2020-01-06 PROCEDURE — 25000003 PHARM REV CODE 250: Performed by: STUDENT IN AN ORGANIZED HEALTH CARE EDUCATION/TRAINING PROGRAM

## 2020-01-06 PROCEDURE — 36415 COLL VENOUS BLD VENIPUNCTURE: CPT

## 2020-01-06 PROCEDURE — 99233 SBSQ HOSP IP/OBS HIGH 50: CPT | Mod: ,,, | Performed by: INTERNAL MEDICINE

## 2020-01-06 PROCEDURE — 99232 PR SUBSEQUENT HOSPITAL CARE,LEVL II: ICD-10-PCS | Mod: ,,, | Performed by: NURSE PRACTITIONER

## 2020-01-06 PROCEDURE — 84484 ASSAY OF TROPONIN QUANT: CPT

## 2020-01-06 PROCEDURE — 93005 ELECTROCARDIOGRAM TRACING: CPT

## 2020-01-06 PROCEDURE — S0028 INJECTION, FAMOTIDINE, 20 MG: HCPCS | Performed by: STUDENT IN AN ORGANIZED HEALTH CARE EDUCATION/TRAINING PROGRAM

## 2020-01-06 PROCEDURE — 80053 COMPREHEN METABOLIC PANEL: CPT

## 2020-01-06 PROCEDURE — 93010 EKG 12-LEAD: ICD-10-PCS | Mod: ,,, | Performed by: INTERNAL MEDICINE

## 2020-01-06 PROCEDURE — 99233 PR SUBSEQUENT HOSPITAL CARE,LEVL III: ICD-10-PCS | Mod: ,,, | Performed by: INTERNAL MEDICINE

## 2020-01-06 PROCEDURE — 85025 COMPLETE CBC W/AUTO DIFF WBC: CPT

## 2020-01-06 PROCEDURE — 84100 ASSAY OF PHOSPHORUS: CPT

## 2020-01-06 PROCEDURE — 99232 SBSQ HOSP IP/OBS MODERATE 35: CPT | Mod: ,,, | Performed by: NURSE PRACTITIONER

## 2020-01-06 PROCEDURE — 63600175 PHARM REV CODE 636 W HCPCS: Performed by: STUDENT IN AN ORGANIZED HEALTH CARE EDUCATION/TRAINING PROGRAM

## 2020-01-06 PROCEDURE — 93010 ELECTROCARDIOGRAM REPORT: CPT | Mod: ,,, | Performed by: INTERNAL MEDICINE

## 2020-01-06 RX ORDER — FAMOTIDINE 20 MG/1
20 TABLET, FILM COATED ORAL DAILY
Status: DISCONTINUED | OUTPATIENT
Start: 2020-01-07 | End: 2020-01-11

## 2020-01-06 RX ORDER — ACETAMINOPHEN 500 MG
1000 TABLET ORAL ONCE
Status: COMPLETED | OUTPATIENT
Start: 2020-01-06 | End: 2020-01-06

## 2020-01-06 RX ADMIN — ESCITALOPRAM OXALATE 10 MG: 10 TABLET ORAL at 09:01

## 2020-01-06 RX ADMIN — FAMOTIDINE 20 MG: 10 INJECTION, SOLUTION INTRAVENOUS at 09:01

## 2020-01-06 RX ADMIN — ACETAMINOPHEN 1000 MG: 500 TABLET ORAL at 09:01

## 2020-01-06 RX ADMIN — NITROGLYCERIN 0.4 MG: 0.4 TABLET SUBLINGUAL at 08:01

## 2020-01-06 RX ADMIN — INSULIN ASPART 5 UNITS: 100 INJECTION, SOLUTION INTRAVENOUS; SUBCUTANEOUS at 05:01

## 2020-01-06 RX ADMIN — INSULIN ASPART 4 UNITS: 100 INJECTION, SOLUTION INTRAVENOUS; SUBCUTANEOUS at 09:01

## 2020-01-06 RX ADMIN — ASPIRIN 81 MG CHEWABLE TABLET 81 MG: 81 TABLET CHEWABLE at 09:01

## 2020-01-06 RX ADMIN — GABAPENTIN 300 MG: 300 CAPSULE ORAL at 09:01

## 2020-01-06 RX ADMIN — INSULIN ASPART 5 UNITS: 100 INJECTION, SOLUTION INTRAVENOUS; SUBCUTANEOUS at 12:01

## 2020-01-06 RX ADMIN — ATORVASTATIN CALCIUM 80 MG: 20 TABLET, FILM COATED ORAL at 09:01

## 2020-01-06 NOTE — ASSESSMENT & PLAN NOTE
PLAN:  -- current Cr 1. Increased to 3.4> 4.2  -- gave 500 cc bolus NS 1/4 - Judicious due to EF 35%  -- Avoid nephrotoxic medications if able  -- Hold furosemide, lisinopril  - UAR and retroperitoneal US unremarkable. Spun urine showing granular casts  - Nephrology on board

## 2020-01-06 NOTE — NURSING
Pt is AAOx4 and ambulates with stand-by assistance. Pt denies chest pain/ SOB. Pt not able to provide stool sample.

## 2020-01-06 NOTE — ASSESSMENT & PLAN NOTE
CAD S/P MI 2010, Angiogram 12/18/19 mid LAD 90% stenosis, mid OM2 75% stenosis, and Ostial D1 70% stenosis LVDEP 25 mmHg    PLAN:  - CABG tentatively planned for Tuesday 1/14/2020  - c/w ASA, full potency statin   - ACEI and carvedilol held due to ROSLYN and borderline low BP  - Plavix stopped per CTS recommendations

## 2020-01-06 NOTE — ASSESSMENT & PLAN NOTE
55 y/o AAF with known medical medical issues of chronic combined systolic and diastolic HF (EF 35% Grade II DD), CAD S/P MI 2010, Angiogram 12/18/19 mid LAD 90% stenosis, mid OM2 75% stenosis, and Ostial D1 70% stenosis LVDEP 25 mmHg, PAD S/P PTA with stents BLE 2015, IDDM who presents with SOB and CHF Exacerbation, admitted for NSTEMI, found to have a ROSLYN on the second day of admission. Urine sediment revealed multiple granular casts with occasional epithelial cells.     DDX: Most likely due to recent hypotension on 1/4 from X3 sublingual nitro vs recent contrast induced nephropathy from CT w/ contrast exposure 1/2/20 and CTA on 1/3/20 and CTA on 12/6/19 vs Contraction ROSLYN vs ATN    - UA: 3+ Protein, 22 RBC, 2 WBCs  - Urine protein/creatinine ratio - 1.34, micro albumin/creatinine ratio - 812.5  - urine sediment: multiple granular casts, occasional epithelial cells     PLAN:  -- Strict I/O and chart   -- daily weights   -- no need for RRT at this time  -- Trend RFP daily to monitor BUN/Cr closely. Creatinine trend: 1.2>>1.7>>3.6> 4.2  -- renally dose all medications. Agree with holding Gabapentin at this time. If needed to be had please renally dose.  -- Avoid nephrotoxic medications, NSAIDs, IV contrast, ACE/ARB in setting of acute renal injury but should restart ACEi once renal function improves due to increase microalbumin/Cr ratio from chronic IDDM.  -- Encourage normal P.O. Fluid intake with 1500cc Restriction due to EF 35% and recent ADHF  -- Will follow closely     Unsure at this time how this will effect the scheduling of the upcoming CABG surgery. Will defer to CTS and Primary team

## 2020-01-06 NOTE — PROGRESS NOTES
[facility] Hospital Medicine  Progress Note    Patient Name: Suyapa Connelly  MRN: [id number]  Patient Class: IP- Inpatient   Admission Date: 1/2/2020  Length of Stay: 4 days  Attending Physician: Lulú Macias MD  Primary Care Provider: Erlinda Rahman NP    [facility] Medicine Team: [facility] 2 Viral Lo MD    Subjective:     Principal Problem:Acute coronary syndrome    HPI:  Ms. Connelly is a 53 y/o AAF with known medical medical issues of chronic combined systolic and diastolic HF (EF 35% Grade II DD), CAD S/P MI 2010, Angiogram 12/18/19 mid LAD 90% stenosis, mid OM2 75% stenosis, and Ostial D1 70% stenosis LVDEP 25 mmHg, PAD S/P PTA with stents BLE 2015, IDDM (A1c 10.3%), HTN, HLD, GERD, Anxiety/ Depression. Patient present with a 2 day history of nausea and with multiple episodes of non-bilious non-bloody emesis and couple of episodes of watery non-bloody diarrhea. Patient reports initially feeling HARTMAN without associated chest pain/tightness for the past week before progressively worsening to the GI symptoms described. She reports having a 7 lb weight gain (dry weight unknown) and only able to walk less than 20-30 ft before feeling short of breath. She reports that 5-10 minute rest resolves her dyspnea and she never has shortness of breath at rest. She reports feeling similar symptoms to this when she was admitted to the hospital on 12/6/19 for ADHF. On Monday 12/30 she doubled her home torsemide dosage to help reduce the excess water weight but reports that it did not help much. When he GI symptoms developed on Tuesday, she reports being unable to tolerate anything P.O. and subsequently stopped taking all of her home medications including her home insulin. What prompted her to come into the hospital was a fluttering feeling in her chest associated with some diaphoresis. She reports not remembering the symptoms she felt when she was having her MI in 2010 but  at bedside  "reported that she had nausea and vomiting leading up to her MI and she collapsed and lost consciousness and then later once admitted to the hospital was told she had had a heart attack. Currently the patient reports feeling nauseated, diaphoretic, a mild headache, epigastric pain, with a vague "heart burn" like feeling in her chest. She denies light headedness, chest tightness, dyspnea, palpitations, dysuria, constipation, edema. She denies any recent sick contacts and fevers at home.    In the ED, , EKG showed sinus tachycardia without ST changes, 1st troponin 0.176, BNP 1,013, CXR - mild interstitial edema with pulmonary vascular congestion. She was seen by cardiology who suggested an ACS work up due to her recent angiogram showing vessel blockages and her being a diabetic with similar vague symptoms from last MI. The also suggested for CTS to be consulted to assess for CABG. Patient was admitted to hospital medicine Team 2 for further management and work up.      Overview/Hospital Course:  53 y/o AAF with known medical medical issues of chronic combined systolic and diastolic HF (EF 35% Grade II DD), CAD S/P MI 2010, Angiogram 12/18/19 mid LAD 90% stenosis, mid OM2 75% stenosis, and Ostial D1 70% stenosis LVDEP 25 mmHg, PAD S/P PTA with stents BLE 2015, IDDM (A1c 10.3%), HTN, HLD, GERD, Anxiety/ Depression. Worked up for NSTEMI/ACS due to Chest discomfort nonspecific ST changes on EKG and elevated troponin. Patient had an MI in 2002 with similar prodrome/symptoms leading up to the acute coronary event. Patient tentatively planned for CABG depending on the CTA results and her repeat HbA1C for 1/7/20, plan for surgery involving hybrid off pump coronary artery bypass (LIMA-LAD) with postoperative percutaneous coronary intervention to the LCx. Will hold Plavix in anticipation of surgery and will start heparin gtt after 48 hour per Cards recommendations. Patient developed 10/10 right sided non radiating sharp " chest pain 1/4/20 after eating. Ordered Troponin - 0.188 -->0.169, CXR - Interval improvement of opacity in the right lower lung zone not pneumothorax or widened midiastineum, EKG - No ST changes to rule out MI and gave subliqual nitro X3. Gave a GI cocktail to rule out indigestion. Patient felt relief after burping. Endocrinology following for optimal diabetes management in anticipation of surgery per CTS request. 1/5/2020: Patient denies chest pain. She developed ROSLYN on CKD which is most likely multifactorial. Urine electrolytes, U/s retroperitoneal unremarkable. Nephrology on board with Cr trending up  1.2>3.4>4.2    Interval History: NAEO. Patient distressed as mother is also in the hospital. Maintaining good uop but Cr trending up to 4.2 today. CTS moved surgery to next Tuesday pending improved kidney function    Review of Systems  Objective:     Vital Signs (Most Recent):  Temp: 96.1 °F (35.6 °C) (01/06/20 1155)  Pulse: 91 (01/06/20 1454)  Resp: 17 (01/06/20 1155)  BP: 115/61 (01/06/20 1155)  SpO2: 95 % (01/06/20 1155) Vital Signs (24h Range):  Temp:  [96 °F (35.6 °C)-98 °F (36.7 °C)] 96.1 °F (35.6 °C)  Pulse:  [73-91] 91  Resp:  [16-18] 17  SpO2:  [94 %-98 %] 95 %  BP: ()/(50-67) 115/61     Weight: 93.1 kg (205 lb 2.2 oz)  Body mass index is 35.21 kg/m².    Intake/Output Summary (Last 24 hours) at 1/6/2020 1543  Last data filed at 1/5/2020 2300  Gross per 24 hour   Intake 240 ml   Output 150 ml   Net 90 ml      Physical Exam   Constitutional: She is oriented to person, place, and time. She appears well-developed and well-nourished. She does not have a sickly appearance. She does not appear ill. No distress.   HENT:   Head: Normocephalic and atraumatic.   Eyes: Conjunctivae and EOM are normal.   Neck: Normal range of motion. Neck supple. No JVD present.   Cardiovascular: Regular rhythm and intact distal pulses.   Pulmonary/Chest: Effort normal. No respiratory distress. She has no rales. She exhibits no  "tenderness.   Abdominal: Soft. Bowel sounds are normal. She exhibits no distension. There is no tenderness. There is no rebound and no guarding.   Musculoskeletal: Normal range of motion. She exhibits no edema or tenderness.   Neurological: She is alert and oriented to person, place, and time. No sensory deficit.   Skin: Skin is warm and dry. She is not diaphoretic.   Psychiatric: She has a normal mood and affect. Her behavior is normal. Judgment and thought content normal.       Significant Labs:   CBC:   Recent Labs   Lab 01/05/20 0535 01/06/20  0609   WBC 6.62 6.67   HGB 14.4 10.9*   HCT 46.2 35.0*    242     CMP:   Recent Labs   Lab 01/05/20 0535 01/06/20  0609   * 137   K 3.5 3.4*   CL 94* 99   CO2 27 25    87   BUN 47* 65*   CREATININE 3.6* 4.2*   CALCIUM 9.5 8.1*   PROT 8.2 6.2   ALBUMIN 2.8* 2.3*   BILITOT 0.5 0.3   ALKPHOS 99 82   AST 9* 9*   ALT 6* 6*   ANIONGAP 13 13   EGFRNONAA 13.7* 11.4*           Assessment/Plan:      * Acute coronary syndrome  55 y/o AAF with known medical medical issues of chronic combined systolic and diastolic HF (EF 35% Grade II DD), CAD S/P MI 2010, Angiogram 12/18/19 mid LAD 90% stenosis, mid OM2 75% stenosis, and Ostial D1 70% stenosis LVDEP 25 mmHg, PAD S/P PTA with stents BLE 2015, IDDM (A1c 10.3%), HTN, HLD presenting with HARTMAN and nausea/ vomiting and "heart burn" like chest discomfort similar in characteristic for her last MI in 2010.     EKG - Sinus tachycardia without ST changes  1st Troponin - 0.176  BNP - 1,013    Seen by Cardiology in the ED who suggested ACS work up and CTS consult due to multi-vessel coronary disease  TTE: minimal changes since last echo. CVP 8 mmHg     PLAN:  -- Heparin gtt ACS protocol - for 48 hours stop date 1/4/20  --  mg in ED with 81 mg daily and Plavix load 300 mg X1 Holding Plavix in anticipation of CABG tentatively 1/7/20   -- Lasix 80mg IV BID - held   -- EKG PRN  -- continuous cardiac monitoring  -- CTS " Consulted - tentatively planned for CABG depending on the CTA results and her repeat HbA1C, plan for surgery involving hybrid off pump coronary artery bypass (LIMA-LAD) with postoperative percutaneous coronary intervention to the LCx -   -- Famotidine IV 20 mg BID - for prevention of GI Bleeds in ACS work up  -- High intensity atorvastatin 80 mg QHS  - CABG tentatively planned for Tuesday 1/7/2020  - c/w ASA, full potency statin.  - ACEI and carvedilol held due to ROSLYN and borderline low BP  - Plavix stopped per CTS recommendations     Weakness  PT/OT      Contrast dye induced nephropathy  PLAN:  -- current Cr 1. Increased to 3.4> 4.2  -- gave 500 cc bolus NS 1/4 - Judicious due to EF 35%  -- Avoid nephrotoxic medications if able  -- Hold furosemide, lisinopril  - UAR and retroperitoneal US unremarkable. Spun urine showing granular casts  - Nephrology on board      CAD (coronary artery disease)  CAD S/P MI 2010, Angiogram 12/18/19 mid LAD 90% stenosis, mid OM2 75% stenosis, and Ostial D1 70% stenosis LVDEP 25 mmHg    PLAN:  - CABG tentatively planned for Tuesday 1/14/2020  - c/w ASA, full potency statin   - ACEI and carvedilol held due to ROSLYN and borderline low BP  - Plavix stopped per CTS recommendations     Type 2 diabetes mellitus with hyperglycemia, with long-term current use of insulin  A1c 12/2019 - 10.3% 1/3/2020 - 9.7%    BG on admission was 347. Patient reports not taking her insulin for 2 days due to low P.O. Intake  Home regimen: Aspart 9 U WM + Lantus 50 U QHS    PLAN:  -- Per Endocrine recommendation; appreciate recs:  Levemir 20 units daily and novolog 3-5 units with meals with meals pending on PO intake   -- POC BG 4 X daily   -- LDSSI PRN  -- Goal -180      Acute on chronic combined systolic (congestive) and diastolic (congestive) heart failure  TTE 12/6/19: Moderately decreased left ventricular systolic function. The estimated ejection fraction is 35%. Grade II (moderate) left ventricular  diastolic dysfunction consistent with pseudonormalization. Moderate global hypokinetic wall motion. Normal right ventricular systolic function. Severe left atrial enlargement. Mild-to-moderate mitral regurgitation. Mild to moderate tricuspid regurgitation. The estimated PA systolic pressure is 49 mm Hg.Pulmonary hypertension present. Normal central venous pressure (3 mm Hg).    CXR 1/2/20: Mild interstitial edema with pulmonary vasculature congestion    TTE 1/3/20: Mild left ventricular enlargement. Moderately decreased left ventricular systolic function. The estimated ejection fraction is 35%. Grade II (moderate) left ventricular diastolic dysfunction consistent with pseudonormalization. Septal wall has abnormal motion. Local segmental wall motion abnormalities. Moderate left atrial enlargement. Normal right ventricular systolic function. Mild mitral regurgitation. Mild tricuspid regurgitation. Mild pulmonic regurgitation. Intermediate central venous pressure (8 mm Hg). The estimated PA systolic pressure is 46 mm Hg. Very small aneurysm in inferoapex visualized on image 47. The quantitatively dervived ejection fraction is 34%. Mild aortic regurgitation. Pulmonary hypertension present.    PLAN:  -- Carvedilol due to borderline low BP  -- Hold ACEi in setting of soft BP and contrast induced ROSLYN  -- IV lasix 80 mg QD - Held in the setting of ROSLYN  -- Strict I/Os  -- Daily standing weights  -- Low sodium (<2g) diet with 1500 cc fluid restriction        Mixed hyperlipidemia  Home med:  Atorvastatin 80 mg    Lipid panel 12/16/19: T cholesterol 244, HDL 46, , Triglyceride 135  Lipid panel 1/3/2020: T cholesterol 246 HDL 41 ; Triglycerides 115    PLAN:  -- Continue home statin  -- goal LDL <70     Non-intractable vomiting with nausea  On admission patient having two days of non-bloody non-bilious emesis with poor P.O. Intake    PLAN: - Improved  -- P.O. Phenergan 25 mg PRN  -- EKG daily to assess for QTc.  1/3/20 EKG QTc - 479  1/4/20 EKG Sinus rhythm with marked sinus arrythmia  Minimal voltage criteria for LVH, QT prolongation at 464      Essential hypertension  Home: Lisinopril 40 mg QD, Coreg 12.5 mg BID    PLAN:  -- Hold anti-HTN while BP is soft and patient has contrast induced ROSLYN  -- Goal BP <140/90      Peripheral vascular disease  S/P PTA with stents BLE 2015    CTA Ab/pelvis with run off:  1. Diffuse atherosclerotic disease resulting in stenosis of the origins of the celiac artery, SMA, bilateral accessory renal arteries.  2. There is diffuse atherosclerotic disease in the bilateral lower extremities with bilateral SFA-popliteal artery stent occlusion.  The popliteal arteries reconstitute distally with collateral vessels.  3. Leiomyomatous uterus.      ROSLYN (acute kidney injury)          VTE Risk Mitigation (From admission, onward)         Ordered     Place sequential compression device  Until discontinued      01/02/20 1518     IP VTE HIGH RISK PATIENT  Once      01/02/20 1518                  Viral Lo MD  Department of Hospital Medicine   [facility]

## 2020-01-06 NOTE — SUBJECTIVE & OBJECTIVE
Interval History: NAEON. Patient has worsening renal function in the setting recent contrast exposure X2 on 1/2 and 1/3 and previous exposure in the past most on 12/6 as well as recent hypotension 80s/50s from sublingual nitro use X3 on 1/4.    Review of patient's allergies indicates:   Allergen Reactions    Pcn [penicillins]      rash     Current Facility-Administered Medications   Medication Frequency    acetaminophen tablet 650 mg Q4H PRN    aspirin chewable tablet 81 mg Daily    atorvastatin tablet 80 mg Daily    dextrose 10% (D10W) Bolus PRN    dextrose 10% (D10W) Bolus PRN    dextrose 10% (D10W) Bolus PRN    dextrose 10% (D10W) Bolus PRN    escitalopram oxalate tablet 10 mg Daily    famotidine (PF) injection 20 mg Daily    glucagon (human recombinant) injection 1 mg PRN    glucose chewable tablet 16 g PRN    glucose chewable tablet 24 g PRN    insulin aspart U-100 pen 0-5 Units QID (AC + HS) PRN    insulin aspart U-100 pen 3-5 Units TIDWM    insulin detemir U-100 pen 20 Units QHS    melatonin tablet 6 mg Once    nitroGLYCERIN SL tablet 0.4 mg Q5 Min PRN    promethazine tablet 25 mg Q6H PRN    simethicone 40 mg/0.6 mL drops 20 mg QID PRN    sodium chloride 0.9% flush 10 mL PRN      Review of Systems   Constitutional: Negative for diaphoresis, fever and weight loss.   HENT: Negative for congestion and sinus pain.    Eyes: Negative for double vision and photophobia.   Respiratory: Negative for cough, sputum production and shortness of breath.    Cardiovascular: Negative for chest pain, palpitations and leg swelling.   Gastrointestinal: Negative for abdominal pain, heartburn, nausea and vomiting.   Genitourinary: Negative for dysuria, flank pain, frequency and urgency.   Musculoskeletal: Negative for back pain and myalgias.   Neurological: Negative for weakness and headaches.   Psychiatric/Behavioral: Negative for depression. The patient is not nervous/anxious.        Objective:     Vital  Signs (Most Recent):  Temp: 96.1 °F (35.6 °C) (01/06/20 1155)  Pulse: 86 (01/06/20 1155)  Resp: 17 (01/06/20 1155)  BP: 115/61 (01/06/20 1155)  SpO2: 95 % (01/06/20 1155)  O2 Device (Oxygen Therapy): room air (01/05/20 2025) Vital Signs (24h Range):  Temp:  [96 °F (35.6 °C)-98 °F (36.7 °C)] 96.1 °F (35.6 °C)  Pulse:  [73-88] 86  Resp:  [16-18] 17  SpO2:  [94 %-98 %] 95 %  BP: ()/(50-67) 115/61     Weight: 93.1 kg (205 lb 2.2 oz) (01/06/20 0400)  Body mass index is 35.21 kg/m².  Body surface area is 2.05 meters squared.    I/O last 3 completed shifts:  In: 740 [P.O.:740]  Out: 350 [Urine:350]    Physical Exam   Constitutional: She is oriented to person, place, and time. She appears well-developed and well-nourished. No distress.   HENT:   Head: Normocephalic and atraumatic.   Eyes: Conjunctivae and EOM are normal.   Neck: Normal range of motion. Neck supple. No JVD present.   Cardiovascular: Normal rate, regular rhythm and intact distal pulses.   No murmur heard.  Pulmonary/Chest: Effort normal and breath sounds normal. No respiratory distress. She has no rales.   Abdominal: Soft. Bowel sounds are normal. She exhibits no distension and no mass. There is no tenderness. There is no rebound.   Musculoskeletal: She exhibits no edema or tenderness.   Neurological: She is alert and oriented to person, place, and time.   Skin: Skin is warm and dry. She is not diaphoretic. No erythema.   Psychiatric: She has a normal mood and affect. Her behavior is normal. Judgment and thought content normal.       Significant Labs:  CBC:   Recent Labs   Lab 01/06/20  0609   WBC 6.67   RBC 3.58*   HGB 10.9*   HCT 35.0*      MCV 98   MCH 30.4   MCHC 31.1*     CMP:   Recent Labs   Lab 01/06/20  0609   GLU 87   CALCIUM 8.1*   ALBUMIN 2.3*   PROT 6.2      K 3.4*   CO2 25   CL 99   BUN 65*   CREATININE 4.2*   ALKPHOS 82   ALT 6*   AST 9*   BILITOT 0.3     Microbiology Results (last 7 days)     Procedure Component Value Units  Date/Time    Blood Culture #1 **CANNOT BE ORDERED STAT** [[id number]] Collected:  01/02/20 1700    Order Status:  Completed Specimen:  Blood from Peripheral, Antecubital, Left Updated:  01/05/20 1812     Blood Culture, Routine No Growth to date      No Growth to date      No Growth to date      No Growth to date    Blood Culture #1 **CANNOT BE ORDERED STAT** [[id number]] Collected:  01/02/20 1700    Order Status:  Completed Specimen:  Blood from Peripheral, Antecubital, Right Updated:  01/05/20 1812     Blood Culture, Routine No Growth to date      No Growth to date      No Growth to date      No Growth to date    Respiratory Infection Panel, Nasopharyngeal [[id number]] Collected:  01/03/20 0055    Order Status:  Completed Specimen:  Nasopharyngeal Swab Updated:  01/03/20 0435     Respiratory Infection Panel Source NP Swab     Adenovirus Not Detected     Coronavirus 229E Not Detected     Coronavirus HKU1 Not Detected     Coronavirus NL63 Not Detected     Coronavirus OC43 Not Detected     Human Metapneumovirus Not Detected     Human Rhinovirus/Enterovirus Not Detected     Influenza A (subtypes H1, H1-2009,H3) Not Detected     Influenza B Not Detected     Parainfluenza Virus 1 Not Detected     Parainfluenza Virus 2 Not Detected     Parainfluenza Virus 3 Not Detected     Parainfluenza Virus 4 Not Detected     Respiratory Syncytial Virus Not Detected     Bordetella Parapertussis (PZ3139) Not Detected     Bordetella pertussis (ptxP) Not Detected     Chlamydia pneumoniae Not Detected     Mycoplasma pneumoniae Not Detected     Comment: Respiratory Infection Panel testing performed by Multiplex PCR.       Narrative:       For all other respiratory sources order [id number] Respiratory  Viral Panel by PCR (RVPCR)    Influenza A & B by Molecular [[id number]] Collected:  01/02/20 1659    Order Status:  Completed Specimen:  Nasopharyngeal Swab Updated:  01/02/20 1738     Influenza A, Molecular Negative     Influenza B, Molecular  Negative     Flu A & B Source NP    Stool culture [[id number]]     Order Status:  No result Specimen:  Stool     Blood culture (site 2) [[id number]] Collected:  01/02/20 1607    Order Status:  Canceled Specimen:  Blood from Peripheral, Antecubital, Right     Blood culture (site 1) [[id number]] Collected:  01/02/20 1549    Order Status:  Canceled Specimen:  Blood from Peripheral, Antecubital, Left         Recent Labs   Lab 01/02/20 2010   COLORU Straw   SPECGRAV 1.025   PHUR 6.0   PROTEINUA 3+*   BACTERIA Occasional   NITRITE Negative   LEUKOCYTESUR Negative   HYALINECASTS 0     All labs within the past 24 hours have been reviewed.     Significant Imaging: All imaging within the past 24 hours have been reviewed.

## 2020-01-06 NOTE — SUBJECTIVE & OBJECTIVE
Interval History: NAEO. Patient distressed as mother is also in the hospital. Maintaining good uop but Cr trending up to 4.2 today. CTS moved surgery to next Tuesday pending improved kidney function    Review of Systems  Objective:     Vital Signs (Most Recent):  Temp: 96.1 °F (35.6 °C) (01/06/20 1155)  Pulse: 91 (01/06/20 1454)  Resp: 17 (01/06/20 1155)  BP: 115/61 (01/06/20 1155)  SpO2: 95 % (01/06/20 1155) Vital Signs (24h Range):  Temp:  [96 °F (35.6 °C)-98 °F (36.7 °C)] 96.1 °F (35.6 °C)  Pulse:  [73-91] 91  Resp:  [16-18] 17  SpO2:  [94 %-98 %] 95 %  BP: ()/(50-67) 115/61     Weight: 93.1 kg (205 lb 2.2 oz)  Body mass index is 35.21 kg/m².    Intake/Output Summary (Last 24 hours) at 1/6/2020 1543  Last data filed at 1/5/2020 2300  Gross per 24 hour   Intake 240 ml   Output 150 ml   Net 90 ml      Physical Exam   Constitutional: She is oriented to person, place, and time. She appears well-developed and well-nourished. She does not have a sickly appearance. She does not appear ill. No distress.   HENT:   Head: Normocephalic and atraumatic.   Eyes: Conjunctivae and EOM are normal.   Neck: Normal range of motion. Neck supple. No JVD present.   Cardiovascular: Regular rhythm and intact distal pulses.   Pulmonary/Chest: Effort normal. No respiratory distress. She has no rales. She exhibits no tenderness.   Abdominal: Soft. Bowel sounds are normal. She exhibits no distension. There is no tenderness. There is no rebound and no guarding.   Musculoskeletal: Normal range of motion. She exhibits no edema or tenderness.   Neurological: She is alert and oriented to person, place, and time. No sensory deficit.   Skin: Skin is warm and dry. She is not diaphoretic.   Psychiatric: She has a normal mood and affect. Her behavior is normal. Judgment and thought content normal.       Significant Labs:   CBC:   Recent Labs   Lab 01/05/20  0535 01/06/20  0609   WBC 6.62 6.67   HGB 14.4 10.9*   HCT 46.2 35.0*    242      CMP:   Recent Labs   Lab 01/05/20  0535 01/06/20  0609   * 137   K 3.5 3.4*   CL 94* 99   CO2 27 25    87   BUN 47* 65*   CREATININE 3.6* 4.2*   CALCIUM 9.5 8.1*   PROT 8.2 6.2   ALBUMIN 2.8* 2.3*   BILITOT 0.5 0.3   ALKPHOS 99 82   AST 9* 9*   ALT 6* 6*   ANIONGAP 13 13   EGFRNONAA 13.7* 11.4*

## 2020-01-06 NOTE — PROGRESS NOTES
"[facility]  Endocrinology  Progress Note    Admit Date: 2020     Reason for Consult: Management of T2DM, Hyperglycemia     Surgical Procedure and Date: n/a    Diabetes diagnosis year:      Home Diabetes Medications:  Lantus 50 units daily and novolog 9 units with meals   Lab Results   Component Value Date    HGBA1C 9.7 (H) 2020         How often checking glucose at home? 1-3 x day   BG readings on regimen: 100-200 typically; sometimes in 300s   Hypoglycemia on the regimen?  Yes occasionally - usually overnight   Missed doses on regimen?  Yes    Diabetes Complications include:     Hyperglycemia, Hypoglycemia  and Diabetic peripheral neuropathy     Complicating diabetes co morbidities:   History of MI and CHF      HPI:   Patient is a 54 y.o. female with a diagnosis of uncontrolled type 2 DM. Also with CHF, PAD, HTN, HLD, and CAD. Patient presented with chest pain and diaphoresis. Also with nauseated, diaphoretic, a mild headache, epigastric pain, with a vague "heart burn" like feeling in her chest. Patient admitted for treatment and further workup. Endocrinology consulted for BG/ DM management.             Interval HPI:   Overnight events: Remains in [facility]. NAEON. Creatinine 4.2. CTS post-poned surgery until next Tuesday per primary team. FBG slightly below goal but BG trends up throughout the day.   Eatin%  Nausea: No  Hypoglycemia and intervention: No  Fever: No  TPN and/or TF: No    /67 (BP Location: Right arm, Patient Position: Sitting)   Pulse 83   Temp 96.4 °F (35.8 °C) (Oral)   Resp 18   Ht 5' 4" (1.626 m)   Wt 93.1 kg (205 lb 2.2 oz)   LMP 2015 (Exact Date)   SpO2 98%   Breastfeeding? No   BMI 35.21 kg/m²      Labs Reviewed and Include    Recent Labs   Lab 20  0609   GLU 87   CALCIUM 8.1*   ALBUMIN 2.3*   PROT 6.2      K 3.4*   CO2 25   CL 99   BUN 65*   CREATININE 4.2*   ALKPHOS 82   ALT 6*   AST 9*   BILITOT 0.3     Lab Results "   Component Value Date    WBC 6.67 01/06/2020    HGB 10.9 (L) 01/06/2020    HCT 35.0 (L) 01/06/2020    MCV 98 01/06/2020     01/06/2020     Recent Labs   Lab 01/02/20  1701   TSH 0.562     Lab Results   Component Value Date    HGBA1C 9.7 (H) 01/03/2020       Nutritional status:   Body mass index is 35.21 kg/m².  Lab Results   Component Value Date    ALBUMIN 2.3 (L) 01/06/2020    ALBUMIN 2.8 (L) 01/05/2020    ALBUMIN 2.4 (L) 01/04/2020     No results found for: PREALBUMIN    Estimated Creatinine Clearance: 17.1 mL/min (A) (based on SCr of 4.2 mg/dL (H)).    Accu-Checks  Recent Labs     01/04/20  0748 01/04/20  1238 01/04/20  1726 01/04/20  2138 01/05/20  0918 01/05/20  1239 01/05/20  1746 01/05/20  2058 01/06/20  0033 01/06/20  0756   POCTGLUCOSE 119* 111* 87 176* 104 173* 148* 223* 203* 93       Current Medications and/or Treatments Impacting Glycemic Control  Immunotherapy:    Immunosuppressants     None        Steroids:   Hormones (From admission, onward)    Start     Stop Route Frequency Ordered    01/04/20 2137  melatonin tablet 6 mg      -- Oral Once 01/04/20 2137        Pressors:    Autonomic Drugs (From admission, onward)    None        Hyperglycemia/Diabetes Medications:   Antihyperglycemics (From admission, onward)    Start     Stop Route Frequency Ordered    01/05/20 2100  insulin detemir U-100 pen 20 Units      -- SubQ Nightly 01/05/20 1310    01/05/20 1645  insulin aspart U-100 pen 3-5 Units      -- SubQ 3 times daily with meals 01/05/20 1310    01/04/20 0846  insulin aspart U-100 pen 0-5 Units      -- SubQ Before meals & nightly PRN 01/04/20 0746          ASSESSMENT and PLAN    * Acute coronary syndrome  Managed per primary.       Type 2 diabetes mellitus with hyperglycemia, with long-term current use of insulin  BG goal 140-180.     BG remains slightly below goal and not taking prandial insulin.     Recommend:   Decrease to Levemir 16 units daily; FBG below goal.  increase novolog 4-6 units with  meals with meals pending on PO intake (Administer 4 units if patient eats 25% -  50% and 6 units if patient eats 50% or more). Patient continues with prandial excursions.   BG monitoring ac/hs and continue low dose correction scale give kidney function.       CAD (coronary artery disease)  Optimize glucose control and  avoid hypoglycemia        Acute on chronic combined systolic (congestive) and diastolic (congestive) heart failure  Optimize glucose control and  avoid hypoglycemia              Fely Puga NP  Endocrinology  [facility]

## 2020-01-06 NOTE — PLAN OF CARE
Problem: Adult Inpatient Plan of Care  Goal: Plan of Care Review  Outcome: Ongoing, Progressing  Goal: Patient-Specific Goal (Individualization)  Outcome: Ongoing, Progressing  Goal: Absence of Hospital-Acquired Illness or Injury  Outcome: Ongoing, Progressing  Goal: Optimal Comfort and Wellbeing  Outcome: Ongoing, Progressing  Goal: Readiness for Transition of Care  Outcome: Ongoing, Progressing  Goal: Rounds/Family Conference  Outcome: Ongoing, Progressing     Problem: Adult Inpatient Plan of Care  Goal: Patient-Specific Goal (Individualization)  Outcome: Ongoing, Progressing     Problem: Infection  Goal: Infection Symptom Resolution  Outcome: Ongoing, Progressing     Problem: Diabetes Comorbidity  Goal: Blood Glucose Level Within Desired Range  Outcome: Ongoing, Progressing     Problem: Arrhythmia (Acute Coronary Syndrome)  Goal: Normalized Cardiac Rhythm  Outcome: Ongoing, Progressing     Problem: Pain (Acute Coronary Syndrome)  Goal: Absence of Cardiac-Related Pain  Outcome: Ongoing, Progressing     Problem: Tissue Perfusion (Acute Coronary Syndrome)  Goal: Adequate Tissue Perfusion  Outcome: Ongoing, Progressing

## 2020-01-06 NOTE — PROGRESS NOTES
[facility]  Nephrology  Progress Note    Patient Name: Suyapa Connelly  MRN: [id number]  Admission Date: 1/2/2020  Hospital Length of Stay: 4 days  Attending Provider: Lulú Macias MD   Primary Care Physician: Erlinda Rahman NP  Principal Problem:ROSLYN (acute kidney injury)    Subjective:     HPI: 53 y/o AAF with known medical medical issues of chronic combined systolic and diastolic HF (EF 35% Grade II DD), CAD S/P MI 2010, Angiogram 12/18/19 mid LAD 90% stenosis, mid OM2 75% stenosis, and Ostial D1 70% stenosis LVDEP 25 mmHg, PAD S/P PTA with stents BLE 2015, IDDM who presented to the ED with SOB and chest pain, found to have an NSTEMI which was treated medically with asa, heparin gtt. CTA of the lower extremities performed on 01/03 to investigate vasculature useful for CABG. Creatinine 3.6 up from a baseline of 1.2. She reports decreased urine output since the CTA but no increased swelling of the lower extremities. No SOB. She an episode of hypotension on 01/04 with a systolic of 80. She was on Lasix IV BID, last dose on 01/04 at 9:00 am. Nephrology consulted for oliguric ROSLYN.      Interval History: NAEON. Patient has worsening renal function in the setting recent contrast exposure X2 on 1/2 and 1/3 and previous exposure in the past most on 12/6 as well as recent hypotension 80s/50s from sublingual nitro use X3 on 1/4.    Review of patient's allergies indicates:   Allergen Reactions    Pcn [penicillins]      rash     Current Facility-Administered Medications   Medication Frequency    acetaminophen tablet 650 mg Q4H PRN    aspirin chewable tablet 81 mg Daily    atorvastatin tablet 80 mg Daily    dextrose 10% (D10W) Bolus PRN    dextrose 10% (D10W) Bolus PRN    dextrose 10% (D10W) Bolus PRN    dextrose 10% (D10W) Bolus PRN    escitalopram oxalate tablet 10 mg Daily    famotidine (PF) injection 20 mg Daily    glucagon (human recombinant) injection 1 mg PRN    glucose chewable tablet  16 g PRN    glucose chewable tablet 24 g PRN    insulin aspart U-100 pen 0-5 Units QID (AC + HS) PRN    insulin aspart U-100 pen 3-5 Units TIDWM    insulin detemir U-100 pen 20 Units QHS    melatonin tablet 6 mg Once    nitroGLYCERIN SL tablet 0.4 mg Q5 Min PRN    promethazine tablet 25 mg Q6H PRN    simethicone 40 mg/0.6 mL drops 20 mg QID PRN    sodium chloride 0.9% flush 10 mL PRN      Review of Systems   Constitutional: Negative for diaphoresis, fever and weight loss.   HENT: Negative for congestion and sinus pain.    Eyes: Negative for double vision and photophobia.   Respiratory: Negative for cough, sputum production and shortness of breath.    Cardiovascular: Negative for chest pain, palpitations and leg swelling.   Gastrointestinal: Negative for abdominal pain, heartburn, nausea and vomiting.   Genitourinary: Negative for dysuria, flank pain, frequency and urgency.   Musculoskeletal: Negative for back pain and myalgias.   Neurological: Negative for weakness and headaches.   Psychiatric/Behavioral: Negative for depression. The patient is not nervous/anxious.        Objective:     Vital Signs (Most Recent):  Temp: 96.1 °F (35.6 °C) (01/06/20 1155)  Pulse: 86 (01/06/20 1155)  Resp: 17 (01/06/20 1155)  BP: 115/61 (01/06/20 1155)  SpO2: 95 % (01/06/20 1155)  O2 Device (Oxygen Therapy): room air (01/05/20 2025) Vital Signs (24h Range):  Temp:  [96 °F (35.6 °C)-98 °F (36.7 °C)] 96.1 °F (35.6 °C)  Pulse:  [73-88] 86  Resp:  [16-18] 17  SpO2:  [94 %-98 %] 95 %  BP: ()/(50-67) 115/61     Weight: 93.1 kg (205 lb 2.2 oz) (01/06/20 0400)  Body mass index is 35.21 kg/m².  Body surface area is 2.05 meters squared.    I/O last 3 completed shifts:  In: 740 [P.O.:740]  Out: 350 [Urine:350]    Physical Exam   Constitutional: She is oriented to person, place, and time. She appears well-developed and well-nourished. No distress.   HENT:   Head: Normocephalic and atraumatic.   Eyes: Conjunctivae and EOM are normal.    Neck: Normal range of motion. Neck supple. No JVD present.   Cardiovascular: Normal rate, regular rhythm and intact distal pulses.   No murmur heard.  Pulmonary/Chest: Effort normal and breath sounds normal. No respiratory distress. She has no rales.   Abdominal: Soft. Bowel sounds are normal. She exhibits no distension and no mass. There is no tenderness. There is no rebound.   Musculoskeletal: She exhibits no edema or tenderness.   Neurological: She is alert and oriented to person, place, and time.   Skin: Skin is warm and dry. She is not diaphoretic. No erythema.   Psychiatric: She has a normal mood and affect. Her behavior is normal. Judgment and thought content normal.       Significant Labs:  CBC:   Recent Labs   Lab 01/06/20  0609   WBC 6.67   RBC 3.58*   HGB 10.9*   HCT 35.0*      MCV 98   MCH 30.4   MCHC 31.1*     CMP:   Recent Labs   Lab 01/06/20  0609   GLU 87   CALCIUM 8.1*   ALBUMIN 2.3*   PROT 6.2      K 3.4*   CO2 25   CL 99   BUN 65*   CREATININE 4.2*   ALKPHOS 82   ALT 6*   AST 9*   BILITOT 0.3     Microbiology Results (last 7 days)     Procedure Component Value Units Date/Time    Blood Culture #1 **CANNOT BE ORDERED STAT** [[id number]] Collected:  01/02/20 1700    Order Status:  Completed Specimen:  Blood from Peripheral, Antecubital, Left Updated:  01/05/20 1812     Blood Culture, Routine No Growth to date      No Growth to date      No Growth to date      No Growth to date    Blood Culture #1 **CANNOT BE ORDERED STAT** [[id number]] Collected:  01/02/20 1700    Order Status:  Completed Specimen:  Blood from Peripheral, Antecubital, Right Updated:  01/05/20 1812     Blood Culture, Routine No Growth to date      No Growth to date      No Growth to date      No Growth to date    Respiratory Infection Panel, Nasopharyngeal [[id number]] Collected:  01/03/20 0055    Order Status:  Completed Specimen:  Nasopharyngeal Swab Updated:  01/03/20 0435     Respiratory Infection Panel Source NP Swab      Adenovirus Not Detected     Coronavirus 229E Not Detected     Coronavirus HKU1 Not Detected     Coronavirus NL63 Not Detected     Coronavirus OC43 Not Detected     Human Metapneumovirus Not Detected     Human Rhinovirus/Enterovirus Not Detected     Influenza A (subtypes H1, H1-2009,H3) Not Detected     Influenza B Not Detected     Parainfluenza Virus 1 Not Detected     Parainfluenza Virus 2 Not Detected     Parainfluenza Virus 3 Not Detected     Parainfluenza Virus 4 Not Detected     Respiratory Syncytial Virus Not Detected     Bordetella Parapertussis (VP0299) Not Detected     Bordetella pertussis (ptxP) Not Detected     Chlamydia pneumoniae Not Detected     Mycoplasma pneumoniae Not Detected     Comment: Respiratory Infection Panel testing performed by Multiplex PCR.       Narrative:       For all other respiratory sources order [id number] Respiratory  Viral Panel by PCR (RVPCR)    Influenza A & B by Molecular [[id number]] Collected:  01/02/20 1659    Order Status:  Completed Specimen:  Nasopharyngeal Swab Updated:  01/02/20 1738     Influenza A, Molecular Negative     Influenza B, Molecular Negative     Flu A & B Source NP    Stool culture [[id number]]     Order Status:  No result Specimen:  Stool     Blood culture (site 2) [[id number]] Collected:  01/02/20 1607    Order Status:  Canceled Specimen:  Blood from Peripheral, Antecubital, Right     Blood culture (site 1) [[id number]] Collected:  01/02/20 1549    Order Status:  Canceled Specimen:  Blood from Peripheral, Antecubital, Left         Recent Labs   Lab 01/02/20 2010   COLORU Straw   SPECGRAV 1.025   PHUR 6.0   PROTEINUA 3+*   BACTERIA Occasional   NITRITE Negative   LEUKOCYTESUR Negative   HYALINECASTS 0     All labs within the past 24 hours have been reviewed.     Significant Imaging: All imaging within the past 24 hours have been reviewed.       Assessment/Plan:     * ROSLYN (acute kidney injury)  55 y/o AAF with known medical medical issues of chronic  combined systolic and diastolic HF (EF 35% Grade II DD), CAD S/P MI 2010, Angiogram 12/18/19 mid LAD 90% stenosis, mid OM2 75% stenosis, and Ostial D1 70% stenosis LVDEP 25 mmHg, PAD S/P PTA with stents BLE 2015, IDDM who presents with SOB and CHF Exacerbation, admitted for NSTEMI, found to have a ROSLYN on the second day of admission. Urine sediment revealed multiple granular casts with occasional epithelial cells.     DDX: Most likely due to recent hypotension on 1/4 from X3 sublingual nitro vs recent contrast induced nephropathy from CT w/ contrast exposure 1/2/20 and CTA on 1/3/20 and CTA on 12/6/19 vs Contraction ROSLYN vs ATN    - UA: 3+ Protein, 22 RBC, 2 WBCs  - Urine protein/creatinine ratio - 1.34, micro albumin/creatinine ratio - 812.5  - urine sediment: multiple granular casts, occasional epithelial cells     PLAN:  -- Strict I/O and chart   -- daily weights   -- no need for RRT at this time  -- Trend RFP daily to monitor BUN/Cr closely. Creatinine trend: 1.2>>1.7>>3.6> 4.2  -- renally dose all medications   -- Avoid nephrotoxic medications, NSAIDs, IV contrast, ACE/ARB in setting of acute renal injury but should restart ACEi once renal function improves due to increase microalbumin/Cr ratio from chronic IDDM.  -- Encourage normal P.O. Fluid intake with 1500cc Restriction due to EF 35% and recent ADHF  -- Will follow closely     Unsure at this time how this will effect the scheduling of the upcoming CABG surgery. Will defer to CTS and Primary team      Contrast dye induced nephropathy  See ROSLYN    Type 2 diabetes mellitus with hyperglycemia, with long-term current use of insulin  management as per primary and endocrineology      Thank you for your consult. I will follow-up with patient. Please contact us if you have any additional questions.    Batsheva Torres, DO  Nephrology  [facility]

## 2020-01-06 NOTE — PLAN OF CARE
01/06/20 0843   Post-Acute Status   Post-Acute Authorization Other   Other Status No Post-Acute Service Needs

## 2020-01-06 NOTE — SUBJECTIVE & OBJECTIVE
"Interval HPI:   Overnight events: Remains in [facility]. PEE. Creatinine 4.2. CTS post-poned surgery until next Tuesday per primary team. FBG slightly below goal but BG trends up throughout the day.   Eatin%  Nausea: No  Hypoglycemia and intervention: No  Fever: No  TPN and/or TF: No    /67 (BP Location: Right arm, Patient Position: Sitting)   Pulse 83   Temp 96.4 °F (35.8 °C) (Oral)   Resp 18   Ht 5' 4" (1.626 m)   Wt 93.1 kg (205 lb 2.2 oz)   LMP 2015 (Exact Date)   SpO2 98%   Breastfeeding? No   BMI 35.21 kg/m²     Labs Reviewed and Include    Recent Labs   Lab 20  0609   GLU 87   CALCIUM 8.1*   ALBUMIN 2.3*   PROT 6.2      K 3.4*   CO2 25   CL 99   BUN 65*   CREATININE 4.2*   ALKPHOS 82   ALT 6*   AST 9*   BILITOT 0.3     Lab Results   Component Value Date    WBC 6.67 2020    HGB 10.9 (L) 2020    HCT 35.0 (L) 2020    MCV 98 2020     2020     Recent Labs   Lab 20  1701   TSH 0.562     Lab Results   Component Value Date    HGBA1C 9.7 (H) 2020       Nutritional status:   Body mass index is 35.21 kg/m².  Lab Results   Component Value Date    ALBUMIN 2.3 (L) 2020    ALBUMIN 2.8 (L) 2020    ALBUMIN 2.4 (L) 2020     No results found for: PREALBUMIN    Estimated Creatinine Clearance: 17.1 mL/min (A) (based on SCr of 4.2 mg/dL (H)).    Accu-Checks  Recent Labs     20  0748 20  1238 20  1726 20  2138 20  0918 20  1239 20  1746 20  2058 20  0033 20  0756   POCTGLUCOSE 119* 111* 87 176* 104 173* 148* 223* 203* 93       Current Medications and/or Treatments Impacting Glycemic Control  Immunotherapy:    Immunosuppressants     None        Steroids:   Hormones (From admission, onward)    Start     Stop Route Frequency Ordered    20  melatonin tablet 6 mg      -- Oral Once 20        Pressors:    Autonomic Drugs (From admission, onward)    None    "     Hyperglycemia/Diabetes Medications:   Antihyperglycemics (From admission, onward)    Start     Stop Route Frequency Ordered    01/05/20 2100  insulin detemir U-100 pen 20 Units      -- SubQ Nightly 01/05/20 1310    01/05/20 1645  insulin aspart U-100 pen 3-5 Units      -- SubQ 3 times daily with meals 01/05/20 1310    01/04/20 0846  insulin aspart U-100 pen 0-5 Units      -- SubQ Before meals & nightly PRN 01/04/20 0746

## 2020-01-07 ENCOUNTER — ANESTHESIA (OUTPATIENT)
Dept: SURGERY | Facility: HOSPITAL | Age: 55
DRG: 235 | End: 2020-01-07
Payer: MEDICAID

## 2020-01-07 LAB
ALBUMIN SERPL BCP-MCNC: 2.8 G/DL (ref 3.5–5.2)
ALP SERPL-CCNC: 94 U/L (ref 55–135)
ALT SERPL W/O P-5'-P-CCNC: 11 U/L (ref 10–44)
ANION GAP SERPL CALC-SCNC: 14 MMOL/L (ref 8–16)
AST SERPL-CCNC: 13 U/L (ref 10–40)
BACTERIA BLD CULT: NORMAL
BACTERIA BLD CULT: NORMAL
BASOPHILS # BLD AUTO: 0.05 K/UL (ref 0–0.2)
BASOPHILS NFR BLD: 0.8 % (ref 0–1.9)
BILIRUB SERPL-MCNC: 0.3 MG/DL (ref 0.1–1)
BUN SERPL-MCNC: 63 MG/DL (ref 6–20)
CALCIUM SERPL-MCNC: 8.7 MG/DL (ref 8.7–10.5)
CHLORIDE SERPL-SCNC: 101 MMOL/L (ref 95–110)
CO2 SERPL-SCNC: 23 MMOL/L (ref 23–29)
CREAT SERPL-MCNC: 2.6 MG/DL (ref 0.5–1.4)
DIFFERENTIAL METHOD: ABNORMAL
EOSINOPHIL # BLD AUTO: 0.2 K/UL (ref 0–0.5)
EOSINOPHIL NFR BLD: 2.3 % (ref 0–8)
ERYTHROCYTE [DISTWIDTH] IN BLOOD BY AUTOMATED COUNT: 12.6 % (ref 11.5–14.5)
EST. GFR  (AFRICAN AMERICAN): 23.4 ML/MIN/1.73 M^2
EST. GFR  (NON AFRICAN AMERICAN): 20.3 ML/MIN/1.73 M^2
GLUCOSE SERPL-MCNC: 176 MG/DL (ref 70–110)
HCT VFR BLD AUTO: 39 % (ref 37–48.5)
HGB BLD-MCNC: 12.1 G/DL (ref 12–16)
IMM GRANULOCYTES # BLD AUTO: 0.02 K/UL (ref 0–0.04)
IMM GRANULOCYTES NFR BLD AUTO: 0.3 % (ref 0–0.5)
LYMPHOCYTES # BLD AUTO: 1.6 K/UL (ref 1–4.8)
LYMPHOCYTES NFR BLD: 24.7 % (ref 18–48)
MAGNESIUM SERPL-MCNC: 2.7 MG/DL (ref 1.6–2.6)
MCH RBC QN AUTO: 30.6 PG (ref 27–31)
MCHC RBC AUTO-ENTMCNC: 31 G/DL (ref 32–36)
MCV RBC AUTO: 99 FL (ref 82–98)
MONOCYTES # BLD AUTO: 0.7 K/UL (ref 0.3–1)
MONOCYTES NFR BLD: 10.5 % (ref 4–15)
NEUTROPHILS # BLD AUTO: 4.1 K/UL (ref 1.8–7.7)
NEUTROPHILS NFR BLD: 61.4 % (ref 38–73)
NRBC BLD-RTO: 0 /100 WBC
OB PNL STL: POSITIVE
PHOSPHATE SERPL-MCNC: 4.9 MG/DL (ref 2.7–4.5)
PLATELET # BLD AUTO: 269 K/UL (ref 150–350)
PMV BLD AUTO: 10.8 FL (ref 9.2–12.9)
POCT GLUCOSE: 159 MG/DL (ref 70–110)
POCT GLUCOSE: 167 MG/DL (ref 70–110)
POCT GLUCOSE: 210 MG/DL (ref 70–110)
POTASSIUM SERPL-SCNC: 3.7 MMOL/L (ref 3.5–5.1)
PROT SERPL-MCNC: 7.4 G/DL (ref 6–8.4)
RBC # BLD AUTO: 3.96 M/UL (ref 4–5.4)
SODIUM SERPL-SCNC: 138 MMOL/L (ref 136–145)
WBC # BLD AUTO: 6.59 K/UL (ref 3.9–12.7)
WBC #/AREA STL HPF: NORMAL /[HPF]

## 2020-01-07 PROCEDURE — 11000001 HC ACUTE MED/SURG PRIVATE ROOM

## 2020-01-07 PROCEDURE — 85025 COMPLETE CBC W/AUTO DIFF WBC: CPT

## 2020-01-07 PROCEDURE — 99232 PR SUBSEQUENT HOSPITAL CARE,LEVL II: ICD-10-PCS | Mod: ,,, | Performed by: INTERNAL MEDICINE

## 2020-01-07 PROCEDURE — 87209 SMEAR COMPLEX STAIN: CPT

## 2020-01-07 PROCEDURE — 83735 ASSAY OF MAGNESIUM: CPT

## 2020-01-07 PROCEDURE — 25000003 PHARM REV CODE 250: Performed by: INTERNAL MEDICINE

## 2020-01-07 PROCEDURE — 80053 COMPREHEN METABOLIC PANEL: CPT

## 2020-01-07 PROCEDURE — 82272 OCCULT BLD FECES 1-3 TESTS: CPT

## 2020-01-07 PROCEDURE — 63600175 PHARM REV CODE 636 W HCPCS: Performed by: STUDENT IN AN ORGANIZED HEALTH CARE EDUCATION/TRAINING PROGRAM

## 2020-01-07 PROCEDURE — 89055 LEUKOCYTE ASSESSMENT FECAL: CPT

## 2020-01-07 PROCEDURE — 25000003 PHARM REV CODE 250: Performed by: STUDENT IN AN ORGANIZED HEALTH CARE EDUCATION/TRAINING PROGRAM

## 2020-01-07 PROCEDURE — 99232 SBSQ HOSP IP/OBS MODERATE 35: CPT | Mod: ,,, | Performed by: INTERNAL MEDICINE

## 2020-01-07 PROCEDURE — 36415 COLL VENOUS BLD VENIPUNCTURE: CPT

## 2020-01-07 PROCEDURE — 84100 ASSAY OF PHOSPHORUS: CPT

## 2020-01-07 RX ORDER — INSULIN ASPART 100 [IU]/ML
4-6 INJECTION, SOLUTION INTRAVENOUS; SUBCUTANEOUS
Status: DISCONTINUED | OUTPATIENT
Start: 2020-01-07 | End: 2020-01-09

## 2020-01-07 RX ORDER — TALC
6 POWDER (GRAM) TOPICAL ONCE
Status: COMPLETED | OUTPATIENT
Start: 2020-01-07 | End: 2020-01-07

## 2020-01-07 RX ORDER — HEPARIN SODIUM 5000 [USP'U]/ML
5000 INJECTION, SOLUTION INTRAVENOUS; SUBCUTANEOUS EVERY 8 HOURS
Status: DISCONTINUED | OUTPATIENT
Start: 2020-01-07 | End: 2020-01-10

## 2020-01-07 RX ADMIN — HEPARIN SODIUM 5000 UNITS: 5000 INJECTION, SOLUTION INTRAVENOUS; SUBCUTANEOUS at 09:01

## 2020-01-07 RX ADMIN — INSULIN DETEMIR 20 UNITS: 100 INJECTION, SOLUTION SUBCUTANEOUS at 09:01

## 2020-01-07 RX ADMIN — ATORVASTATIN CALCIUM 80 MG: 20 TABLET, FILM COATED ORAL at 09:01

## 2020-01-07 RX ADMIN — INSULIN ASPART 4 UNITS: 100 INJECTION, SOLUTION INTRAVENOUS; SUBCUTANEOUS at 07:01

## 2020-01-07 RX ADMIN — ESCITALOPRAM OXALATE 10 MG: 10 TABLET ORAL at 09:01

## 2020-01-07 RX ADMIN — HEPARIN SODIUM 5000 UNITS: 5000 INJECTION, SOLUTION INTRAVENOUS; SUBCUTANEOUS at 03:01

## 2020-01-07 RX ADMIN — Medication 6 MG: at 09:01

## 2020-01-07 RX ADMIN — ASPIRIN 81 MG CHEWABLE TABLET 81 MG: 81 TABLET CHEWABLE at 09:01

## 2020-01-07 RX ADMIN — INSULIN ASPART 4 UNITS: 100 INJECTION, SOLUTION INTRAVENOUS; SUBCUTANEOUS at 09:01

## 2020-01-07 RX ADMIN — FAMOTIDINE 20 MG: 20 TABLET ORAL at 09:01

## 2020-01-07 RX ADMIN — INSULIN ASPART 4 UNITS: 100 INJECTION, SOLUTION INTRAVENOUS; SUBCUTANEOUS at 01:01

## 2020-01-07 RX ADMIN — INSULIN ASPART 4 UNITS: 100 INJECTION, SOLUTION INTRAVENOUS; SUBCUTANEOUS at 05:01

## 2020-01-07 NOTE — PROGRESS NOTES
[facility] Hospital Medicine  Progress Note    Patient Name: Suyapa Connelly  MRN: [id number]  Patient Class: IP- Inpatient   Admission Date: 1/2/2020  Length of Stay: 5 days  Attending Physician: Lulú Macias MD  Primary Care Provider: Erlinda Rahman NP    [facility] Medicine Team: [facility] HOSP MED 2 Viral Lo MD    Subjective:     Principal Problem:Acute coronary syndrome        HPI:  Ms. Connelly is a 55 y/o AAF with known medical medical issues of chronic combined systolic and diastolic HF (EF 35% Grade II DD), CAD S/P MI 2010, Angiogram 12/18/19 mid LAD 90% stenosis, mid OM2 75% stenosis, and Ostial D1 70% stenosis LVDEP 25 mmHg, PAD S/P PTA with stents BLE 2015, IDDM (A1c 10.3%), HTN, HLD, GERD, Anxiety/ Depression. Patient present with a 2 day history of nausea and with multiple episodes of non-bilious non-bloody emesis and couple of episodes of watery non-bloody diarrhea. Patient reports initially feeling HARTMAN without associated chest pain/tightness for the past week before progressively worsening to the GI symptoms described. She reports having a 7 lb weight gain (dry weight unknown) and only able to walk less than 20-30 ft before feeling short of breath. She reports that 5-10 minute rest resolves her dyspnea and she never has shortness of breath at rest. She reports feeling similar symptoms to this when she was admitted to the hospital on 12/6/19 for ADHF. On Monday 12/30 she doubled her home torsemide dosage to help reduce the excess water weight but reports that it did not help much. When he GI symptoms developed on Tuesday, she reports being unable to tolerate anything P.O. and subsequently stopped taking all of her home medications including her home insulin. What prompted her to come into the hospital was a fluttering feeling in her chest associated with some diaphoresis. She reports not remembering the symptoms she felt when she was having her MI in 2010 but  at  "bedside reported that she had nausea and vomiting leading up to her MI and she collapsed and lost consciousness and then later once admitted to the hospital was told she had had a heart attack. Currently the patient reports feeling nauseated, diaphoretic, a mild headache, epigastric pain, with a vague "heart burn" like feeling in her chest. She denies light headedness, chest tightness, dyspnea, palpitations, dysuria, constipation, edema. She denies any recent sick contacts and fevers at home.    In the ED, , EKG showed sinus tachycardia without ST changes, 1st troponin 0.176, BNP 1,013, CXR - mild interstitial edema with pulmonary vascular congestion. She was seen by cardiology who suggested an ACS work up due to her recent angiogram showing vessel blockages and her being a diabetic with similar vague symptoms from last MI. The also suggested for CTS to be consulted to assess for CABG. Patient was admitted to hospital medicine Team 2 for further management and work up.      Overview/Hospital Course:  55 y/o AAF with known medical medical issues of chronic combined systolic and diastolic HF (EF 35% Grade II DD), CAD S/P MI 2010, Angiogram 12/18/19 mid LAD 90% stenosis, mid OM2 75% stenosis, and Ostial D1 70% stenosis LVDEP 25 mmHg, PAD S/P PTA with stents BLE 2015, IDDM (A1c 10.3%), HTN, HLD, GERD, Anxiety/ Depression. Worked up for NSTEMI/ACS due to Chest discomfort nonspecific ST changes on EKG and elevated troponin. Patient had an MI in 2002 with similar prodrome/symptoms leading up to the acute coronary event. Patient tentatively planned for CABG depending on the CTA results and her repeat HbA1C for 1/7/20, plan for surgery involving hybrid off pump coronary artery bypass (LIMA-LAD) with postoperative percutaneous coronary intervention to the LCx. Will hold Plavix in anticipation of surgery and will start heparin gtt after 48 hour per Cards recommendations. Patient developed 10/10 right sided non radiating " sharp chest pain 1/4/20 after eating. Ordered Troponin - 0.188 -->0.169, CXR - Interval improvement of opacity in the right lower lung zone not pneumothorax or widened midiastineum, EKG - No ST changes to rule out MI and gave subliqual nitro X3. Gave a GI cocktail to rule out indigestion. Patient felt relief after burping. Endocrinology following for optimal diabetes management in anticipation of surgery per CTS request. 1/5/2020: Patient denies chest pain. She developed ROSLYN on CKD which is most likely multifactorial. Urine electrolytes, U/s retroperitoneal unremarkable. Nephrology on board with Cr trending up  1.2>3.4>4.2> 2.6    Interval History: NAEO. Cr improved to 2.6 today. Will continue to monitor closely and attempt to optimize for CABG. Encouraged patient to take in ~1500cc fluid per day.    Review of Systems  Objective:     Vital Signs (Most Recent):  Temp: 96.6 °F (35.9 °C) (01/07/20 1532)  Pulse: 92 (01/07/20 1532)  Resp: 19 (01/07/20 0905)  BP: 132/81 (01/07/20 1532)  SpO2: 96 % (01/07/20 1532) Vital Signs (24h Range):  Temp:  [96.6 °F (35.9 °C)-98.2 °F (36.8 °C)] 96.6 °F (35.9 °C)  Pulse:  [83-94] 92  Resp:  [18-19] 19  SpO2:  [96 %-98 %] 96 %  BP: (100-138)/(51-90) 132/81     Weight: 93.1 kg (205 lb 2.2 oz)  Body mass index is 35.21 kg/m².    Intake/Output Summary (Last 24 hours) at 1/7/2020 1753  Last data filed at 1/7/2020 1100  Gross per 24 hour   Intake 740 ml   Output 700 ml   Net 40 ml      Physical Exam   Constitutional: She is oriented to person, place, and time. She appears well-developed and well-nourished. She does not have a sickly appearance. She does not appear ill. No distress.   HENT:   Head: Normocephalic and atraumatic.   Eyes: Conjunctivae and EOM are normal.   Neck: Normal range of motion. Neck supple. No JVD present.   Cardiovascular: Regular rhythm and intact distal pulses.   Pulmonary/Chest: Effort normal. No respiratory distress. She has no rales. She exhibits no tenderness.  "  Abdominal: Soft. Bowel sounds are normal. She exhibits no distension. There is no tenderness. There is no rebound and no guarding.   Musculoskeletal: Normal range of motion. She exhibits no edema or tenderness.   Neurological: She is alert and oriented to person, place, and time. No sensory deficit.   Skin: Skin is warm and dry. She is not diaphoretic.   Psychiatric: She has a normal mood and affect. Her behavior is normal. Judgment and thought content normal.       Significant Labs:   CBC:   Recent Labs   Lab 01/06/20  0609 01/07/20  0537   WBC 6.67 6.59   HGB 10.9* 12.1   HCT 35.0* 39.0    269     CMP:   Recent Labs   Lab 01/06/20 0609 01/07/20  0537    138   K 3.4* 3.7   CL 99 101   CO2 25 23   GLU 87 176*   BUN 65* 63*   CREATININE 4.2* 2.6*   CALCIUM 8.1* 8.7   PROT 6.2 7.4   ALBUMIN 2.3* 2.8*   BILITOT 0.3 0.3   ALKPHOS 82 94   AST 9* 13   ALT 6* 11   ANIONGAP 13 14   EGFRNONAA 11.4* 20.3*       Assessment/Plan:      * Acute coronary syndrome  53 y/o AAF with known medical medical issues of chronic combined systolic and diastolic HF (EF 35% Grade II DD), CAD S/P MI 2010, Angiogram 12/18/19 mid LAD 90% stenosis, mid OM2 75% stenosis, and Ostial D1 70% stenosis LVDEP 25 mmHg, PAD S/P PTA with stents BLE 2015, IDDM (A1c 10.3%), HTN, HLD presenting with HARTMAN and nausea/ vomiting and "heart burn" like chest discomfort similar in characteristic for her last MI in 2010.     EKG - Sinus tachycardia without ST changes  1st Troponin - 0.176  BNP - 1,013    Seen by Cardiology in the ED who suggested ACS work up and CTS consult due to multi-vessel coronary disease  TTE: minimal changes since last echo. CVP 8 mmHg     PLAN:  -- Heparin gtt ACS protocol - for 48 hours stop date 1/4/20  --  mg in ED with 81 mg daily and Plavix load 300 mg X1 Holding Plavix in anticipation of CABG tentatively 1/7/20   -- Lasix 80mg IV BID - held   -- EKG PRN  -- continuous cardiac monitoring  -- CTS Consulted - " tentatively planned for CABG  on 1/14,  involving hybrid off pump coronary artery bypass (LIMA-LAD) with postoperative percutaneous coronary intervention to the LCx -   -- Famotidine IV 20 mg BID - for prevention of GI Bleeds in ACS work up  -- High intensity atorvastatin 80 mg QHS  - c/w ASA, full potency statin.  - ACEI and carvedilol held due to ROSLYN and borderline low BP  - Plavix stopped per CTS recommendations     Weakness  PT/OT      Contrast dye induced nephropathy  *improving  PLAN:  -- current Cr 1. Increased to 3.4> 4.2> 2.6  -- gave 500 cc bolus NS 1/4 - Judicious due to EF 35%  -- Avoid nephrotoxic medications if able  -- Hold furosemide, lisinopril  - UAR and retroperitoneal US unremarkable. Spun urine showing granular casts  - Nephrology on board      CAD (coronary artery disease)  CAD S/P MI 2010, Angiogram 12/18/19 mid LAD 90% stenosis, mid OM2 75% stenosis, and Ostial D1 70% stenosis LVDEP 25 mmHg    PLAN:  - CABG tentatively planned for Tuesday 1/14/2020  - c/w ASA, full potency statin   - ACEI and carvedilol held due to ROSLYN and borderline low BP  - Plavix stopped per CTS recommendations     Type 2 diabetes mellitus with hyperglycemia, with long-term current use of insulin  A1c 12/2019 - 10.3% 1/3/2020 - 9.7%    BG on admission was 347. Patient reports not taking her insulin for 2 days due to low P.O. Intake  Home regimen: Aspart 9 U WM + Lantus 50 U QHS    PLAN:  -- Per Endocrine recommendation; appreciate recs:  Levemir 20 units daily and novolog 3-5 units with meals with meals pending on PO intake   -- POC BG 4 X daily   -- LDSSI PRN  -- Goal -180      Acute on chronic combined systolic (congestive) and diastolic (congestive) heart failure  TTE 12/6/19: Moderately decreased left ventricular systolic function. The estimated ejection fraction is 35%. Grade II (moderate) left ventricular diastolic dysfunction consistent with pseudonormalization. Moderate global hypokinetic wall motion. Normal  right ventricular systolic function. Severe left atrial enlargement. Mild-to-moderate mitral regurgitation. Mild to moderate tricuspid regurgitation. The estimated PA systolic pressure is 49 mm Hg.Pulmonary hypertension present. Normal central venous pressure (3 mm Hg).    CXR 1/2/20: Mild interstitial edema with pulmonary vasculature congestion    TTE 1/3/20: Mild left ventricular enlargement. Moderately decreased left ventricular systolic function. The estimated ejection fraction is 35%. Grade II (moderate) left ventricular diastolic dysfunction consistent with pseudonormalization. Septal wall has abnormal motion. Local segmental wall motion abnormalities. Moderate left atrial enlargement. Normal right ventricular systolic function. Mild mitral regurgitation. Mild tricuspid regurgitation. Mild pulmonic regurgitation. Intermediate central venous pressure (8 mm Hg). The estimated PA systolic pressure is 46 mm Hg. Very small aneurysm in inferoapex visualized on image 47. The quantitatively dervived ejection fraction is 34%. Mild aortic regurgitation. Pulmonary hypertension present.    PLAN:  -- Carvedilol due to borderline low BP  -- Hold ACEi in setting of soft BP and contrast induced ROSLYN  -- IV lasix 80 mg QD - Held in the setting of ROSLYN  -- Strict I/Os  -- Daily standing weights  -- Low sodium (<2g) diet with 1500 cc fluid restriction        Mixed hyperlipidemia  Home med:  Atorvastatin 80 mg    Lipid panel 12/16/19: T cholesterol 244, HDL 46, , Triglyceride 135  Lipid panel 1/3/2020: T cholesterol 246 HDL 41 ; Triglycerides 115    PLAN:  -- Continue home statin  -- goal LDL <70     Non-intractable vomiting with nausea  On admission patient having two days of non-bloody non-bilious emesis with poor P.O. Intake    PLAN: - Improved  -- P.O. Phenergan 25 mg PRN  -- EKG daily to assess for QTc. 1/3/20 EKG QTc - 479  1/4/20 EKG Sinus rhythm with marked sinus arrythmia  Minimal voltage criteria for LVH, QT  prolongation at 464      Essential hypertension  Home: Lisinopril 40 mg QD, Coreg 12.5 mg BID    PLAN:  -- Hold anti-HTN while BP is soft and patient has contrast induced ROSLYN  -- Goal BP <140/90      Peripheral vascular disease  S/P PTA with stents BLE 2015    CTA Ab/pelvis with run off:  1. Diffuse atherosclerotic disease resulting in stenosis of the origins of the celiac artery, SMA, bilateral accessory renal arteries.  2. There is diffuse atherosclerotic disease in the bilateral lower extremities with bilateral SFA-popliteal artery stent occlusion.  The popliteal arteries reconstitute distally with collateral vessels.  3. Leiomyomatous uterus.      ROSLYN (acute kidney injury)          VTE Risk Mitigation (From admission, onward)         Ordered     heparin (porcine) injection 5,000 Units  Every 8 hours      01/07/20 0743     Place sequential compression device  Until discontinued      01/02/20 1518     IP VTE HIGH RISK PATIENT  Once      01/02/20 1518              Viral Lo MD  Department of Hospital Medicine   [facility]

## 2020-01-07 NOTE — PLAN OF CARE
Problem: Fall Injury Risk  Goal: Absence of Fall and Fall-Related Injury  Outcome: Ongoing, Progressing     Problem: Adult Inpatient Plan of Care  Goal: Plan of Care Review  Outcome: Ongoing, Progressing  Goal: Patient-Specific Goal (Individualization)  Outcome: Ongoing, Progressing  Goal: Absence of Hospital-Acquired Illness or Injury  Outcome: Ongoing, Progressing  Goal: Optimal Comfort and Wellbeing  Outcome: Ongoing, Progressing  Goal: Readiness for Transition of Care  Outcome: Ongoing, Progressing  Goal: Rounds/Family Conference  Outcome: Ongoing, Progressing     Problem: Infection  Goal: Infection Symptom Resolution  Outcome: Ongoing, Progressing     Problem: Diabetes Comorbidity  Goal: Blood Glucose Level Within Desired Range  Outcome: Ongoing, Progressing

## 2020-01-07 NOTE — NURSING
Call to room per  pt c/o increase chest pain call placed to IM2 SPOKE WITH DR ELI ORDERED TROPIN STATED WILL COME TO BEDSIDE TO ASSESS CHARGE NURSE AWARE

## 2020-01-07 NOTE — ASSESSMENT & PLAN NOTE
*improving  PLAN:  -- current Cr 1. Increased to 3.4> 4.2> 2.6  -- gave 500 cc bolus NS 1/4 - Judicious due to EF 35%  -- Avoid nephrotoxic medications if able  -- Hold furosemide, lisinopril  - UAR and retroperitoneal US unremarkable. Spun urine showing granular casts  - Nephrology on board

## 2020-01-07 NOTE — SUBJECTIVE & OBJECTIVE
Interval History: NAEO. Cr improved to 2.6 today. Will continue to monitor closely and attempt to optimize for CABG. Encouraged patient to take in ~1500cc fluid per day.    Review of Systems  Objective:     Vital Signs (Most Recent):  Temp: 96.6 °F (35.9 °C) (01/07/20 1532)  Pulse: 92 (01/07/20 1532)  Resp: 19 (01/07/20 0905)  BP: 132/81 (01/07/20 1532)  SpO2: 96 % (01/07/20 1532) Vital Signs (24h Range):  Temp:  [96.6 °F (35.9 °C)-98.2 °F (36.8 °C)] 96.6 °F (35.9 °C)  Pulse:  [83-94] 92  Resp:  [18-19] 19  SpO2:  [96 %-98 %] 96 %  BP: (100-138)/(51-90) 132/81     Weight: 93.1 kg (205 lb 2.2 oz)  Body mass index is 35.21 kg/m².    Intake/Output Summary (Last 24 hours) at 1/7/2020 1753  Last data filed at 1/7/2020 1100  Gross per 24 hour   Intake 740 ml   Output 700 ml   Net 40 ml      Physical Exam   Constitutional: She is oriented to person, place, and time. She appears well-developed and well-nourished. She does not have a sickly appearance. She does not appear ill. No distress.   HENT:   Head: Normocephalic and atraumatic.   Eyes: Conjunctivae and EOM are normal.   Neck: Normal range of motion. Neck supple. No JVD present.   Cardiovascular: Regular rhythm and intact distal pulses.   Pulmonary/Chest: Effort normal. No respiratory distress. She has no rales. She exhibits no tenderness.   Abdominal: Soft. Bowel sounds are normal. She exhibits no distension. There is no tenderness. There is no rebound and no guarding.   Musculoskeletal: Normal range of motion. She exhibits no edema or tenderness.   Neurological: She is alert and oriented to person, place, and time. No sensory deficit.   Skin: Skin is warm and dry. She is not diaphoretic.   Psychiatric: She has a normal mood and affect. Her behavior is normal. Judgment and thought content normal.       Significant Labs:   CBC:   Recent Labs   Lab 01/06/20  0609 01/07/20  0537   WBC 6.67 6.59   HGB 10.9* 12.1   HCT 35.0* 39.0    269     CMP:   Recent Labs   Lab  01/06/20  0609 01/07/20  0537    138   K 3.4* 3.7   CL 99 101   CO2 25 23   GLU 87 176*   BUN 65* 63*   CREATININE 4.2* 2.6*   CALCIUM 8.1* 8.7   PROT 6.2 7.4   ALBUMIN 2.3* 2.8*   BILITOT 0.3 0.3   ALKPHOS 82 94   AST 9* 13   ALT 6* 11   ANIONGAP 13 14   EGFRNONAA 11.4* 20.3*

## 2020-01-07 NOTE — NURSING
Call to room per  pt c/o chest pain  Medicated with 1st nitro at 2040 bp 136/70 hr 92 [id number] spo2 98% pt placed on 2LO2/nc for comfort pt not relieve with 1st nitro 2nd nitro given at 2045 not relief from 2nd.@2050  3rd nitro given pt continue to c/o chest pain call placed to IM2 awaiting arya back pt vs at this time 114/65 hr 92 spo2 96% on 2L. Spoke with Dr. Bueno with im2 ekg and tylenol order. Will continue to monitor closely

## 2020-01-07 NOTE — CARE UPDATE
BG goal 140-180    Remains in [facility]. Chest pain overnight. BG at goal this AM with previous Levemir dose. Creatinine improving to 2.6.     Recommend:   Resume Levemir 20 units HS today. Recommend transition insulin infusion (please notify endocrine to place orders as transition insulin infusion is endocrine only orderset) the night before surgery to further optimize and decrease risk of hypoglycemia when NPO.   Continue novolog 4-6 units with meals pending on PO intake.   Continue to optimize BG until surgery.     Discharge planning: TBD     Endocrine to continue to follow    ** Please call Endocrine for any BG related issues **

## 2020-01-07 NOTE — ASSESSMENT & PLAN NOTE
"53 y/o AAF with known medical medical issues of chronic combined systolic and diastolic HF (EF 35% Grade II DD), CAD S/P MI 2010, Angiogram 12/18/19 mid LAD 90% stenosis, mid OM2 75% stenosis, and Ostial D1 70% stenosis LVDEP 25 mmHg, PAD S/P PTA with stents BLE 2015, IDDM (A1c 10.3%), HTN, HLD presenting with HARTMAN and nausea/ vomiting and "heart burn" like chest discomfort similar in characteristic for her last MI in 2010.     EKG - Sinus tachycardia without ST changes  1st Troponin - 0.176  BNP - 1,013    Seen by Cardiology in the ED who suggested ACS work up and CTS consult due to multi-vessel coronary disease  TTE: minimal changes since last echo. CVP 8 mmHg     PLAN:  -- Heparin gtt ACS protocol - for 48 hours stop date 1/4/20  --  mg in ED with 81 mg daily and Plavix load 300 mg X1 Holding Plavix in anticipation of CABG tentatively 1/7/20   -- Lasix 80mg IV BID - held   -- EKG PRN  -- continuous cardiac monitoring  -- CTS Consulted - tentatively planned for CABG on 1/14,  involving hybrid off pump coronary artery bypass (LIMA-LAD) with postoperative percutaneous coronary intervention to the LCx -   -- Famotidine IV 20 mg BID - for prevention of GI Bleeds in ACS work up  -- High intensity atorvastatin 80 mg QHS  - c/w ASA, full potency statin.  - ACEI and carvedilol held due to ROSLYN and borderline low BP  - Plavix stopped per CTS recommendations   "

## 2020-01-07 NOTE — PLAN OF CARE
Problem: Fall Injury Risk  Goal: Absence of Fall and Fall-Related Injury  Outcome: Ongoing, Progressing     Problem: Adult Inpatient Plan of Care  Goal: Plan of Care Review  Outcome: Ongoing, Progressing  Goal: Patient-Specific Goal (Individualization)  Outcome: Ongoing, Progressing  Goal: Absence of Hospital-Acquired Illness or Injury  Outcome: Ongoing, Progressing  Goal: Optimal Comfort and Wellbeing  Outcome: Ongoing, Progressing  Goal: Readiness for Transition of Care  Outcome: Ongoing, Progressing  Goal: Rounds/Family Conference  Outcome: Ongoing, Progressing     Problem: Infection  Goal: Infection Symptom Resolution  Outcome: Ongoing, Progressing     Problem: Diabetes Comorbidity  Goal: Blood Glucose Level Within Desired Range  Outcome: Ongoing, Progressing     Problem: Arrhythmia (Acute Coronary Syndrome)  Goal: Normalized Cardiac Rhythm  Outcome: Ongoing, Progressing     Problem: Tissue Perfusion (Acute Coronary Syndrome)  Goal: Adequate Tissue Perfusion  Outcome: Ongoing, Progressing   Patient is OOB sitting up at bedside. She's oriented times 4. No c/o chest pain or Sob. Insulin dependent. Tele monitoring continuous.  at bedside, safety measures maintained.

## 2020-01-08 ENCOUNTER — TELEPHONE (OUTPATIENT)
Dept: PREADMISSION TESTING | Facility: HOSPITAL | Age: 55
End: 2020-01-08

## 2020-01-08 LAB
ALBUMIN SERPL BCP-MCNC: 2.6 G/DL (ref 3.5–5.2)
ALP SERPL-CCNC: 88 U/L (ref 55–135)
ALT SERPL W/O P-5'-P-CCNC: 11 U/L (ref 10–44)
ANION GAP SERPL CALC-SCNC: 8 MMOL/L (ref 8–16)
AST SERPL-CCNC: 15 U/L (ref 10–40)
BASOPHILS # BLD AUTO: 0.06 K/UL (ref 0–0.2)
BASOPHILS NFR BLD: 0.9 % (ref 0–1.9)
BILIRUB SERPL-MCNC: 0.2 MG/DL (ref 0.1–1)
BUN SERPL-MCNC: 32 MG/DL (ref 6–20)
CALCIUM SERPL-MCNC: 8.7 MG/DL (ref 8.7–10.5)
CHLORIDE SERPL-SCNC: 107 MMOL/L (ref 95–110)
CO2 SERPL-SCNC: 26 MMOL/L (ref 23–29)
CREAT SERPL-MCNC: 1.2 MG/DL (ref 0.5–1.4)
DIFFERENTIAL METHOD: ABNORMAL
EOSINOPHIL # BLD AUTO: 0.2 K/UL (ref 0–0.5)
EOSINOPHIL NFR BLD: 2.6 % (ref 0–8)
ERYTHROCYTE [DISTWIDTH] IN BLOOD BY AUTOMATED COUNT: 12.8 % (ref 11.5–14.5)
EST. GFR  (AFRICAN AMERICAN): 59.6 ML/MIN/1.73 M^2
EST. GFR  (NON AFRICAN AMERICAN): 51.7 ML/MIN/1.73 M^2
GLUCOSE SERPL-MCNC: 126 MG/DL (ref 70–110)
HCT VFR BLD AUTO: 33.8 % (ref 37–48.5)
HGB BLD-MCNC: 10.4 G/DL (ref 12–16)
IMM GRANULOCYTES # BLD AUTO: 0.01 K/UL (ref 0–0.04)
IMM GRANULOCYTES NFR BLD AUTO: 0.2 % (ref 0–0.5)
LYMPHOCYTES # BLD AUTO: 1.9 K/UL (ref 1–4.8)
LYMPHOCYTES NFR BLD: 29.4 % (ref 18–48)
MAGNESIUM SERPL-MCNC: 2.3 MG/DL (ref 1.6–2.6)
MCH RBC QN AUTO: 30.5 PG (ref 27–31)
MCHC RBC AUTO-ENTMCNC: 30.8 G/DL (ref 32–36)
MCV RBC AUTO: 99 FL (ref 82–98)
MONOCYTES # BLD AUTO: 0.8 K/UL (ref 0.3–1)
MONOCYTES NFR BLD: 12.5 % (ref 4–15)
NEUTROPHILS # BLD AUTO: 3.5 K/UL (ref 1.8–7.7)
NEUTROPHILS NFR BLD: 54.4 % (ref 38–73)
NRBC BLD-RTO: 0 /100 WBC
O+P STL TRI STN: NORMAL
PHOSPHATE SERPL-MCNC: 3.2 MG/DL (ref 2.7–4.5)
PLATELET # BLD AUTO: 254 K/UL (ref 150–350)
PMV BLD AUTO: 11 FL (ref 9.2–12.9)
POCT GLUCOSE: 116 MG/DL (ref 70–110)
POCT GLUCOSE: 138 MG/DL (ref 70–110)
POCT GLUCOSE: 146 MG/DL (ref 70–110)
POCT GLUCOSE: 153 MG/DL (ref 70–110)
POCT GLUCOSE: 62 MG/DL (ref 70–110)
POTASSIUM SERPL-SCNC: 4 MMOL/L (ref 3.5–5.1)
PROT SERPL-MCNC: 6.6 G/DL (ref 6–8.4)
RBC # BLD AUTO: 3.41 M/UL (ref 4–5.4)
SODIUM SERPL-SCNC: 141 MMOL/L (ref 136–145)
WBC # BLD AUTO: 6.42 K/UL (ref 3.9–12.7)

## 2020-01-08 PROCEDURE — 99233 SBSQ HOSP IP/OBS HIGH 50: CPT | Mod: ,,, | Performed by: INTERNAL MEDICINE

## 2020-01-08 PROCEDURE — 25000003 PHARM REV CODE 250: Performed by: STUDENT IN AN ORGANIZED HEALTH CARE EDUCATION/TRAINING PROGRAM

## 2020-01-08 PROCEDURE — 25000003 PHARM REV CODE 250: Performed by: INTERNAL MEDICINE

## 2020-01-08 PROCEDURE — 63600175 PHARM REV CODE 636 W HCPCS: Performed by: STUDENT IN AN ORGANIZED HEALTH CARE EDUCATION/TRAINING PROGRAM

## 2020-01-08 PROCEDURE — 99232 PR SUBSEQUENT HOSPITAL CARE,LEVL II: ICD-10-PCS | Mod: ,,, | Performed by: INTERNAL MEDICINE

## 2020-01-08 PROCEDURE — 80053 COMPREHEN METABOLIC PANEL: CPT

## 2020-01-08 PROCEDURE — 11000001 HC ACUTE MED/SURG PRIVATE ROOM

## 2020-01-08 PROCEDURE — 99232 SBSQ HOSP IP/OBS MODERATE 35: CPT | Mod: ,,, | Performed by: INTERNAL MEDICINE

## 2020-01-08 PROCEDURE — 85025 COMPLETE CBC W/AUTO DIFF WBC: CPT

## 2020-01-08 PROCEDURE — 36415 COLL VENOUS BLD VENIPUNCTURE: CPT

## 2020-01-08 PROCEDURE — 99233 PR SUBSEQUENT HOSPITAL CARE,LEVL III: ICD-10-PCS | Mod: ,,, | Performed by: INTERNAL MEDICINE

## 2020-01-08 PROCEDURE — 97802 MEDICAL NUTRITION INDIV IN: CPT

## 2020-01-08 PROCEDURE — C9399 UNCLASSIFIED DRUGS OR BIOLOG: HCPCS | Performed by: STUDENT IN AN ORGANIZED HEALTH CARE EDUCATION/TRAINING PROGRAM

## 2020-01-08 PROCEDURE — 84100 ASSAY OF PHOSPHORUS: CPT

## 2020-01-08 PROCEDURE — 83735 ASSAY OF MAGNESIUM: CPT

## 2020-01-08 RX ORDER — BUPIVACAINE HYDROCHLORIDE 5 MG/ML
INJECTION, SOLUTION EPIDURAL; INTRACAUDAL
Status: DISCONTINUED
Start: 2020-01-08 | End: 2020-01-08 | Stop reason: WASHOUT

## 2020-01-08 RX ORDER — EPINEPHRINE 1 MG/ML
INJECTION, SOLUTION INTRACARDIAC; INTRAMUSCULAR; INTRAVENOUS; SUBCUTANEOUS
Status: DISCONTINUED
Start: 2020-01-08 | End: 2020-01-08 | Stop reason: WASHOUT

## 2020-01-08 RX ORDER — ROPIVACAINE HYDROCHLORIDE 5 MG/ML
INJECTION, SOLUTION EPIDURAL; INFILTRATION; PERINEURAL
Status: DISCONTINUED
Start: 2020-01-08 | End: 2020-01-08 | Stop reason: WASHOUT

## 2020-01-08 RX ADMIN — FAMOTIDINE 20 MG: 20 TABLET ORAL at 10:01

## 2020-01-08 RX ADMIN — INSULIN ASPART 6 UNITS: 100 INJECTION, SOLUTION INTRAVENOUS; SUBCUTANEOUS at 09:01

## 2020-01-08 RX ADMIN — HEPARIN SODIUM 5000 UNITS: 5000 INJECTION, SOLUTION INTRAVENOUS; SUBCUTANEOUS at 10:01

## 2020-01-08 RX ADMIN — HEPARIN SODIUM 5000 UNITS: 5000 INJECTION, SOLUTION INTRAVENOUS; SUBCUTANEOUS at 06:01

## 2020-01-08 RX ADMIN — HEPARIN SODIUM 5000 UNITS: 5000 INJECTION, SOLUTION INTRAVENOUS; SUBCUTANEOUS at 03:01

## 2020-01-08 RX ADMIN — ATORVASTATIN CALCIUM 80 MG: 20 TABLET, FILM COATED ORAL at 10:01

## 2020-01-08 RX ADMIN — ASPIRIN 81 MG CHEWABLE TABLET 81 MG: 81 TABLET CHEWABLE at 10:01

## 2020-01-08 RX ADMIN — INSULIN DETEMIR 20 UNITS: 100 INJECTION, SOLUTION SUBCUTANEOUS at 10:01

## 2020-01-08 RX ADMIN — ESCITALOPRAM OXALATE 10 MG: 10 TABLET ORAL at 10:01

## 2020-01-08 RX ADMIN — INSULIN ASPART 6 UNITS: 100 INJECTION, SOLUTION INTRAVENOUS; SUBCUTANEOUS at 06:01

## 2020-01-08 NOTE — TELEPHONE ENCOUNTER
----- Message from Kasey Hernández LPN sent at 1/7/2020  2:31 PM CST -----  Regarding: FW: Please schedule patient for Pre-op Anesthesia visit.      ----- Message -----  From: Rozina Israel RN  Sent: 12/19/2019  12:34 PM CST  To: Kasey Hernández LPN, #  Subject: Please schedule patient for Pre-op Anesthesi#

## 2020-01-08 NOTE — SUBJECTIVE & OBJECTIVE
Interval History: Patient renal function continuing to improve and trending towards baseline    Review of patient's allergies indicates:   Allergen Reactions    Pcn [penicillins]      rash     Current Facility-Administered Medications   Medication Frequency    acetaminophen tablet 650 mg Q4H PRN    aspirin chewable tablet 81 mg Daily    atorvastatin tablet 80 mg Daily    dextrose 10% (D10W) Bolus PRN    dextrose 10% (D10W) Bolus PRN    escitalopram oxalate tablet 10 mg Daily    famotidine tablet 20 mg Daily    glucagon (human recombinant) injection 1 mg PRN    glucose chewable tablet 16 g PRN    glucose chewable tablet 24 g PRN    heparin (porcine) injection 5,000 Units Q8H    insulin aspart U-100 pen 0-5 Units QID (AC + HS) PRN    insulin aspart U-100 pen 4-6 Units TIDWM    insulin detemir U-100 pen 20 Units QHS    nitroGLYCERIN SL tablet 0.4 mg Q5 Min PRN    promethazine tablet 25 mg Q6H PRN    simethicone 40 mg/0.6 mL drops 20 mg QID PRN    sodium chloride 0.9% flush 10 mL PRN       Objective:     Vital Signs (Most Recent):  Temp: 97.7 °F (36.5 °C) (01/08/20 0829)  Pulse: 88 (01/08/20 0829)  Resp: 16 (01/08/20 0829)  BP: (!) 150/75 (01/08/20 0829)  SpO2: 96 % (01/08/20 0829)  O2 Device (Oxygen Therapy): room air (01/06/20 1955) Vital Signs (24h Range):  Temp:  [96.5 °F (35.8 °C)-97.9 °F (36.6 °C)] 97.7 °F (36.5 °C)  Pulse:  [82-93] 88  Resp:  [16-20] 16  SpO2:  [96 %-98 %] 96 %  BP: (116-150)/(59-81) 150/75     Weight: 93.1 kg (205 lb 2.2 oz) (01/06/20 0400)  Body mass index is 35.21 kg/m².  Body surface area is 2.05 meters squared.    I/O last 3 completed shifts:  In: 740 [P.O.:740]  Out: 2000 [Urine:2000]    Physical Exam   Constitutional: She is oriented to person, place, and time. She appears well-developed and well-nourished. No distress.   HENT:   Head: Normocephalic and atraumatic.   Eyes: Conjunctivae and EOM are normal.   Neck: Normal range of motion. Neck supple. No JVD present.    Cardiovascular: Normal rate, regular rhythm and intact distal pulses.   No murmur heard.  Pulmonary/Chest: Effort normal and breath sounds normal. No respiratory distress. She has no rales.   Abdominal: Soft. Bowel sounds are normal. She exhibits no distension and no mass. There is no tenderness. There is no rebound.   Musculoskeletal: She exhibits no edema or tenderness.   Neurological: She is alert and oriented to person, place, and time.   Skin: Skin is warm and dry. She is not diaphoretic. No erythema.   Psychiatric: She has a normal mood and affect. Her behavior is normal. Judgment and thought content normal.       Significant Labs:  CBC:   Recent Labs   Lab 01/08/20  0553   WBC 6.42   RBC 3.41*   HGB 10.4*   HCT 33.8*      MCV 99*   MCH 30.5   MCHC 30.8*     CMP:   Recent Labs   Lab 01/08/20  0553   *   CALCIUM 8.7   ALBUMIN 2.6*   PROT 6.6      K 4.0   CO2 26      BUN 32*   CREATININE 1.2   ALKPHOS 88   ALT 11   AST 15   BILITOT 0.2     Microbiology Results (last 7 days)     Procedure Component Value Units Date/Time    Blood Culture #1 **CANNOT BE ORDERED STAT** [[id number]] Collected:  01/02/20 1700    Order Status:  Completed Specimen:  Blood from Peripheral, Antecubital, Right Updated:  01/07/20 1812     Blood Culture, Routine No growth after 5 days.    Blood Culture #1 **CANNOT BE ORDERED STAT** [[id number]] Collected:  01/02/20 1700    Order Status:  Completed Specimen:  Blood from Peripheral, Antecubital, Left Updated:  01/07/20 1812     Blood Culture, Routine No growth after 5 days.    Respiratory Infection Panel, Nasopharyngeal [[id number]] Collected:  01/03/20 0055    Order Status:  Completed Specimen:  Nasopharyngeal Swab Updated:  01/03/20 0435     Respiratory Infection Panel Source NP Swab     Adenovirus Not Detected     Coronavirus 229E Not Detected     Coronavirus HKU1 Not Detected     Coronavirus NL63 Not Detected     Coronavirus OC43 Not Detected     Human  Metapneumovirus Not Detected     Human Rhinovirus/Enterovirus Not Detected     Influenza A (subtypes H1, H1-2009,H3) Not Detected     Influenza B Not Detected     Parainfluenza Virus 1 Not Detected     Parainfluenza Virus 2 Not Detected     Parainfluenza Virus 3 Not Detected     Parainfluenza Virus 4 Not Detected     Respiratory Syncytial Virus Not Detected     Bordetella Parapertussis (DN2501) Not Detected     Bordetella pertussis (ptxP) Not Detected     Chlamydia pneumoniae Not Detected     Mycoplasma pneumoniae Not Detected     Comment: Respiratory Infection Panel testing performed by Multiplex PCR.       Narrative:       For all other respiratory sources order [id number] Respiratory  Viral Panel by PCR (RVPCR)    Influenza A & B by Molecular [[id number]] Collected:  01/02/20 1659    Order Status:  Completed Specimen:  Nasopharyngeal Swab Updated:  01/02/20 1738     Influenza A, Molecular Negative     Influenza B, Molecular Negative     Flu A & B Source NP    Stool culture [[id number]]     Order Status:  No result Specimen:  Stool     Blood culture (site 2) [[id number]] Collected:  01/02/20 1607    Order Status:  Canceled Specimen:  Blood from Peripheral, Antecubital, Right     Blood culture (site 1) [[id number]] Collected:  01/02/20 1549    Order Status:  Canceled Specimen:  Blood from Peripheral, Antecubital, Left         Recent Labs   Lab 01/02/20 2010   COLORU Straw   SPECGRAV 1.025   PHUR 6.0   PROTEINUA 3+*   BACTERIA Occasional   NITRITE Negative   LEUKOCYTESUR Negative   HYALINECASTS 0     All labs within the past 24 hours have been reviewed.     Significant Imaging: All imaging within the past 24 hours have been reviewed.

## 2020-01-08 NOTE — ASSESSMENT & PLAN NOTE
TTE 12/6/19: Moderately decreased left ventricular systolic function. The estimated ejection fraction is 35%. Grade II (moderate) left ventricular diastolic dysfunction consistent with pseudonormalization. Moderate global hypokinetic wall motion. Normal right ventricular systolic function. Severe left atrial enlargement. Mild-to-moderate mitral regurgitation. Mild to moderate tricuspid regurgitation. The estimated PA systolic pressure is 49 mm Hg.Pulmonary hypertension present. Normal central venous pressure (3 mm Hg).    CXR 1/2/20: Mild interstitial edema with pulmonary vasculature congestion    TTE 1/3/20: Mild left ventricular enlargement. Moderately decreased left ventricular systolic function. The estimated ejection fraction is 35%. Grade II (moderate) left ventricular diastolic dysfunction consistent with pseudonormalization. Septal wall has abnormal motion. Local segmental wall motion abnormalities. Moderate left atrial enlargement. Normal right ventricular systolic function. Mild mitral regurgitation. Mild tricuspid regurgitation. Mild pulmonic regurgitation. Intermediate central venous pressure (8 mm Hg). The estimated PA systolic pressure is 46 mm Hg. Very small aneurysm in inferoapex visualized on image 47. The quantitatively dervived ejection fraction is 34%. Mild aortic regurgitation. Pulmonary hypertension present.    PLAN:  -- Per primary team

## 2020-01-08 NOTE — PROGRESS NOTES
[facility]  Nephrology  Progress Note    Patient Name: Suyapa Connelly  MRN: [id number]  Admission Date: 1/2/2020  Hospital Length of Stay: 6 days  Attending Provider: Lulú Macias MD   Primary Care Physician: Erlinda Rahman NP  Principal Problem:Acute coronary syndrome    Subjective:     HPI: 53 y/o AAF with known medical medical issues of chronic combined systolic and diastolic HF (EF 35% Grade II DD), CAD S/P MI 2010, Angiogram 12/18/19 mid LAD 90% stenosis, mid OM2 75% stenosis, and Ostial D1 70% stenosis LVDEP 25 mmHg, PAD S/P PTA with stents BLE 2015, IDDM who presented to the ED with SOB and chest pain, found to have an NSTEMI which was treated medically with asa, heparin gtt. CTA of the lower extremities performed on 01/03 to investigate vasculature useful for CABG. Creatinine 3.6 up from a baseline of 1.2. She reports decreased urine output since the CTA but no increased swelling of the lower extremities. No SOB. She an episode of hypotension on 01/04 with a systolic of 80. She was on Lasix IV BID, last dose on 01/04 at 9:00 am. Nephrology consulted for oliguric ROSLYN.      Interval History: Patient renal function continuing to improve and trending towards baseline    Review of patient's allergies indicates:   Allergen Reactions    Pcn [penicillins]      rash     Current Facility-Administered Medications   Medication Frequency    acetaminophen tablet 650 mg Q4H PRN    aspirin chewable tablet 81 mg Daily    atorvastatin tablet 80 mg Daily    dextrose 10% (D10W) Bolus PRN    dextrose 10% (D10W) Bolus PRN    escitalopram oxalate tablet 10 mg Daily    famotidine tablet 20 mg Daily    glucagon (human recombinant) injection 1 mg PRN    glucose chewable tablet 16 g PRN    glucose chewable tablet 24 g PRN    heparin (porcine) injection 5,000 Units Q8H    insulin aspart U-100 pen 0-5 Units QID (AC + HS) PRN    insulin aspart U-100 pen 4-6 Units TIDWM    insulin detemir U-100 pen  20 Units QHS    nitroGLYCERIN SL tablet 0.4 mg Q5 Min PRN    promethazine tablet 25 mg Q6H PRN    simethicone 40 mg/0.6 mL drops 20 mg QID PRN    sodium chloride 0.9% flush 10 mL PRN       Objective:     Vital Signs (Most Recent):  Temp: 97.7 °F (36.5 °C) (01/08/20 0829)  Pulse: 88 (01/08/20 0829)  Resp: 16 (01/08/20 0829)  BP: (!) 150/75 (01/08/20 0829)  SpO2: 96 % (01/08/20 0829)  O2 Device (Oxygen Therapy): room air (01/06/20 1955) Vital Signs (24h Range):  Temp:  [96.5 °F (35.8 °C)-97.9 °F (36.6 °C)] 97.7 °F (36.5 °C)  Pulse:  [82-93] 88  Resp:  [16-20] 16  SpO2:  [96 %-98 %] 96 %  BP: (116-150)/(59-81) 150/75     Weight: 93.1 kg (205 lb 2.2 oz) (01/06/20 0400)  Body mass index is 35.21 kg/m².  Body surface area is 2.05 meters squared.    I/O last 3 completed shifts:  In: 740 [P.O.:740]  Out: 2000 [Urine:2000]    Physical Exam   Constitutional: She is oriented to person, place, and time. She appears well-developed and well-nourished. No distress.   HENT:   Head: Normocephalic and atraumatic.   Eyes: Conjunctivae and EOM are normal.   Neck: Normal range of motion. Neck supple. No JVD present.   Cardiovascular: Normal rate, regular rhythm and intact distal pulses.   No murmur heard.  Pulmonary/Chest: Effort normal and breath sounds normal. No respiratory distress. She has no rales.   Abdominal: Soft. Bowel sounds are normal. She exhibits no distension and no mass. There is no tenderness. There is no rebound.   Musculoskeletal: She exhibits no edema or tenderness.   Neurological: She is alert and oriented to person, place, and time.   Skin: Skin is warm and dry. She is not diaphoretic. No erythema.   Psychiatric: She has a normal mood and affect. Her behavior is normal. Judgment and thought content normal.       Significant Labs:  CBC:   Recent Labs   Lab 01/08/20  0553   WBC 6.42   RBC 3.41*   HGB 10.4*   HCT 33.8*      MCV 99*   MCH 30.5   MCHC 30.8*     CMP:   Recent Labs   Lab 01/08/20  0553   GLU  126*   CALCIUM 8.7   ALBUMIN 2.6*   PROT 6.6      K 4.0   CO2 26      BUN 32*   CREATININE 1.2   ALKPHOS 88   ALT 11   AST 15   BILITOT 0.2     Microbiology Results (last 7 days)     Procedure Component Value Units Date/Time    Blood Culture #1 **CANNOT BE ORDERED STAT** [[id number]] Collected:  01/02/20 1700    Order Status:  Completed Specimen:  Blood from Peripheral, Antecubital, Right Updated:  01/07/20 1812     Blood Culture, Routine No growth after 5 days.    Blood Culture #1 **CANNOT BE ORDERED STAT** [[id number]] Collected:  01/02/20 1700    Order Status:  Completed Specimen:  Blood from Peripheral, Antecubital, Left Updated:  01/07/20 1812     Blood Culture, Routine No growth after 5 days.    Respiratory Infection Panel, Nasopharyngeal [[id number]] Collected:  01/03/20 0055    Order Status:  Completed Specimen:  Nasopharyngeal Swab Updated:  01/03/20 0435     Respiratory Infection Panel Source NP Swab     Adenovirus Not Detected     Coronavirus 229E Not Detected     Coronavirus HKU1 Not Detected     Coronavirus NL63 Not Detected     Coronavirus OC43 Not Detected     Human Metapneumovirus Not Detected     Human Rhinovirus/Enterovirus Not Detected     Influenza A (subtypes H1, H1-2009,H3) Not Detected     Influenza B Not Detected     Parainfluenza Virus 1 Not Detected     Parainfluenza Virus 2 Not Detected     Parainfluenza Virus 3 Not Detected     Parainfluenza Virus 4 Not Detected     Respiratory Syncytial Virus Not Detected     Bordetella Parapertussis (TQ2998) Not Detected     Bordetella pertussis (ptxP) Not Detected     Chlamydia pneumoniae Not Detected     Mycoplasma pneumoniae Not Detected     Comment: Respiratory Infection Panel testing performed by Multiplex PCR.       Narrative:       For all other respiratory sources order [id number] Respiratory  Viral Panel by PCR (RVPCR)    Influenza A & B by Molecular [[id number]] Collected:  01/02/20 1659    Order Status:  Completed Specimen:   Nasopharyngeal Swab Updated:  01/02/20 1738     Influenza A, Molecular Negative     Influenza B, Molecular Negative     Flu A & B Source NP    Stool culture [[id number]]     Order Status:  No result Specimen:  Stool     Blood culture (site 2) [[id number]] Collected:  01/02/20 1607    Order Status:  Canceled Specimen:  Blood from Peripheral, Antecubital, Right     Blood culture (site 1) [[id number]] Collected:  01/02/20 1549    Order Status:  Canceled Specimen:  Blood from Peripheral, Antecubital, Left         Recent Labs   Lab 01/02/20 2010   COLORU Straw   SPECGRAV 1.025   PHUR 6.0   PROTEINUA 3+*   BACTERIA Occasional   NITRITE Negative   LEUKOCYTESUR Negative   HYALINECASTS 0     All labs within the past 24 hours have been reviewed.     Significant Imaging: All imaging within the past 24 hours have been reviewed.       Assessment/Plan:     Contrast dye induced nephropathy  See ROSLYN    Type 2 diabetes mellitus with hyperglycemia, with long-term current use of insulin  PLAN:  management as per primary and endocrineology    Acute on chronic combined systolic (congestive) and diastolic (congestive) heart failure  TTE 12/6/19: Moderately decreased left ventricular systolic function. The estimated ejection fraction is 35%. Grade II (moderate) left ventricular diastolic dysfunction consistent with pseudonormalization. Moderate global hypokinetic wall motion. Normal right ventricular systolic function. Severe left atrial enlargement. Mild-to-moderate mitral regurgitation. Mild to moderate tricuspid regurgitation. The estimated PA systolic pressure is 49 mm Hg.Pulmonary hypertension present. Normal central venous pressure (3 mm Hg).    CXR 1/2/20: Mild interstitial edema with pulmonary vasculature congestion    TTE 1/3/20: Mild left ventricular enlargement. Moderately decreased left ventricular systolic function. The estimated ejection fraction is 35%. Grade II (moderate) left ventricular diastolic dysfunction consistent  with pseudonormalization. Septal wall has abnormal motion. Local segmental wall motion abnormalities. Moderate left atrial enlargement. Normal right ventricular systolic function. Mild mitral regurgitation. Mild tricuspid regurgitation. Mild pulmonic regurgitation. Intermediate central venous pressure (8 mm Hg). The estimated PA systolic pressure is 46 mm Hg. Very small aneurysm in inferoapex visualized on image 47. The quantitatively dervived ejection fraction is 34%. Mild aortic regurgitation. Pulmonary hypertension present.    PLAN:  -- Per primary team        ROSLYN (acute kidney injury)  55 y/o AAF found to have a ROSLYN on the second day of admission. Urine sediment revealed multiple granular casts with occasional epithelial cells.     Was most likely due to recent hypotension on 1/4 from X3 sublingual nitro in addition to recent contrast induced nephropathy from CT w/ contrast exposure 1/2/20 and CTA on 1/3/20 and CTA on 12/6/19     - UA: 3+ Protein, 22 RBC, 2 WBCs  - Urine protein/creatinine ratio - 1.34, micro albumin/creatinine ratio - 812.5  - urine sediment: multiple granular casts, occasional epithelial cells     PLAN:  -- Strict I/O and chart   -- daily weights   -- Trend RFP daily to monitor BUN/Cr closely. Creatinine trend: 1.2>>1.7>>3.6> 4.2>2.>1.2  -- renally dose all medications.   -- Avoid nephrotoxic medications in setting of acute renal injury but should restart ACEi as renal function is improving and  patient has an elevated microalbumin/Cr ratio from chronic IDDM.   -- Encourage normal P.O. Fluid intake with 1500cc restriction due to EF 35% and recent ADHF  -- Nephrology will sign off. Patient should follow up with nephrology OP for long term management of elevated microalbumin in urine from IDDM            Batsheva Torres DO  Nephrology  [facility]

## 2020-01-08 NOTE — ASSESSMENT & PLAN NOTE
*improving  PLAN:  -- current Cr 1. Increased to 3.4> 4.2> 2.6> 1.2  -- gave 500 cc bolus NS 1/4 - Judicious due to EF 35%  -- Avoid nephrotoxic medications if able  -- Hold furosemide, lisinopril  - UAR and retroperitoneal US unremarkable. Spun urine showing granular casts  - Nephrology on board

## 2020-01-08 NOTE — PLAN OF CARE
Patient AO x 4. Has no complaints of pain or discomfort. No falls or injuries during the shift. Currently resting with all safety precautions in place and  at bedside. Will continue too monitor.

## 2020-01-08 NOTE — SUBJECTIVE & OBJECTIVE
Interval History: NAEO. Patient resting comfortably in bed. Cr back near baseline at 1.2. CTS on board will contact and update if there is any possibility of moving surgery up from 1/14    Review of Systems  Objective:     Vital Signs (Most Recent):  Temp: 98.2 °F (36.8 °C) (01/08/20 1146)  Pulse: 87 (01/08/20 1146)  Resp: 16 (01/08/20 0829)  BP: (!) 141/76 (01/08/20 1146)  SpO2: 95 % (01/08/20 1146) Vital Signs (24h Range):  Temp:  [96.5 °F (35.8 °C)-98.2 °F (36.8 °C)] 98.2 °F (36.8 °C)  Pulse:  [82-92] 87  Resp:  [16-20] 16  SpO2:  [95 %-98 %] 95 %  BP: (126-150)/(67-81) 141/76     Weight: 93.1 kg (205 lb 2.2 oz)  Body mass index is 35.21 kg/m².    Intake/Output Summary (Last 24 hours) at 1/8/2020 1151  Last data filed at 1/8/2020 0500  Gross per 24 hour   Intake --   Output 1300 ml   Net -1300 ml      Physical Exam   Constitutional: She is oriented to person, place, and time. She appears well-developed and well-nourished. She does not have a sickly appearance. She does not appear ill. No distress.   HENT:   Head: Normocephalic and atraumatic.   Eyes: Conjunctivae and EOM are normal.   Neck: Normal range of motion. Neck supple. No JVD present.   Cardiovascular: Regular rhythm and intact distal pulses.   Pulmonary/Chest: Effort normal. No respiratory distress. She has no rales. She exhibits no tenderness.   Abdominal: Soft. Bowel sounds are normal. She exhibits no distension. There is no tenderness. There is no rebound and no guarding.   Musculoskeletal: Normal range of motion. She exhibits no edema or tenderness.   Neurological: She is alert and oriented to person, place, and time. No sensory deficit.   Skin: Skin is warm and dry. She is not diaphoretic.   Psychiatric: She has a normal mood and affect. Her behavior is normal. Judgment and thought content normal.       Significant Labs:   CBC:   Recent Labs   Lab 01/07/20  0537 01/08/20  0553   WBC 6.59 6.42   HGB 12.1 10.4*   HCT 39.0 33.8*    254     CMP:    Recent Labs   Lab 01/07/20  0537 01/08/20  0553    141   K 3.7 4.0    107   CO2 23 26   * 126*   BUN 63* 32*   CREATININE 2.6* 1.2   CALCIUM 8.7 8.7   PROT 7.4 6.6   ALBUMIN 2.8* 2.6*   BILITOT 0.3 0.2   ALKPHOS 94 88   AST 13 15   ALT 11 11   ANIONGAP 14 8   EGFRNONAA 20.3* 51.7*

## 2020-01-08 NOTE — PROGRESS NOTES
[facility] Hospital Medicine  Progress Note    Patient Name: Suyapa Connelly  MRN: [id number]  Patient Class: IP- Inpatient   Admission Date: 1/2/2020  Length of Stay: 6 days  Attending Physician: Lulú Macias MD  Primary Care Provider: Erlinda Rahman NP    [facility] Medicine Team: [facility] MED 2 Viral Lo MD    Subjective:     Principal Problem:Acute coronary syndrome        HPI:  Ms. Connelly is a 53 y/o AAF with known medical medical issues of chronic combined systolic and diastolic HF (EF 35% Grade II DD), CAD S/P MI 2010, Angiogram 12/18/19 mid LAD 90% stenosis, mid OM2 75% stenosis, and Ostial D1 70% stenosis LVDEP 25 mmHg, PAD S/P PTA with stents BLE 2015, IDDM (A1c 10.3%), HTN, HLD, GERD, Anxiety/ Depression. Patient present with a 2 day history of nausea and with multiple episodes of non-bilious non-bloody emesis and couple of episodes of watery non-bloody diarrhea. Patient reports initially feeling HARTMAN without associated chest pain/tightness for the past week before progressively worsening to the GI symptoms described. She reports having a 7 lb weight gain (dry weight unknown) and only able to walk less than 20-30 ft before feeling short of breath. She reports that 5-10 minute rest resolves her dyspnea and she never has shortness of breath at rest. She reports feeling similar symptoms to this when she was admitted to the hospital on 12/6/19 for ADHF. On Monday 12/30 she doubled her home torsemide dosage to help reduce the excess water weight but reports that it did not help much. When he GI symptoms developed on Tuesday, she reports being unable to tolerate anything P.O. and subsequently stopped taking all of her home medications including her home insulin. What prompted her to come into the hospital was a fluttering feeling in her chest associated with some diaphoresis. She reports not remembering the symptoms she felt when she was having her MI in 2010 but  at  "bedside reported that she had nausea and vomiting leading up to her MI and she collapsed and lost consciousness and then later once admitted to the hospital was told she had had a heart attack. Currently the patient reports feeling nauseated, diaphoretic, a mild headache, epigastric pain, with a vague "heart burn" like feeling in her chest. She denies light headedness, chest tightness, dyspnea, palpitations, dysuria, constipation, edema. She denies any recent sick contacts and fevers at home.    In the ED, , EKG showed sinus tachycardia without ST changes, 1st troponin 0.176, BNP 1,013, CXR - mild interstitial edema with pulmonary vascular congestion. She was seen by cardiology who suggested an ACS work up due to her recent angiogram showing vessel blockages and her being a diabetic with similar vague symptoms from last MI. The also suggested for CTS to be consulted to assess for CABG. Patient was admitted to hospital medicine Team 2 for further management and work up.      Overview/Hospital Course:  53 y/o AAF with known medical medical issues of chronic combined systolic and diastolic HF (EF 35% Grade II DD), CAD S/P MI 2010, Angiogram 12/18/19 mid LAD 90% stenosis, mid OM2 75% stenosis, and Ostial D1 70% stenosis LVDEP 25 mmHg, PAD S/P PTA with stents BLE 2015, IDDM (A1c 10.3%), HTN, HLD, GERD, Anxiety/ Depression. Worked up for NSTEMI/ACS due to Chest discomfort nonspecific ST changes on EKG and elevated troponin. Patient had an MI in 2002 with similar prodrome/symptoms leading up to the acute coronary event. Patient tentatively planned for CABG depending on the CTA results and her repeat HbA1C for 1/7/20, plan for surgery involving hybrid off pump coronary artery bypass (LIMA-LAD) with postoperative percutaneous coronary intervention to the LCx. Will hold Plavix in anticipation of surgery and will start heparin gtt after 48 hour per Cards recommendations. Patient developed 10/10 right sided non radiating " sharp chest pain 1/4/20 after eating. Ordered Troponin - 0.188 -->0.169, CXR - Interval improvement of opacity in the right lower lung zone not pneumothorax or widened midiastineum, EKG - No ST changes to rule out MI and gave subliqual nitro X3. Gave a GI cocktail to rule out indigestion. Patient felt relief after burping. Endocrinology following for optimal diabetes management in anticipation of surgery per CTS request. 1/5/2020: Patient denies chest pain. She developed ROSLYN on CKD which is most likely multifactorial. Urine electrolytes, U/s retroperitoneal unremarkable. Nephrology on board with Cr trending up  1.2>3.4>4.2> 2.6> 1.2    Interval History: NAEO. Patient resting comfortably in bed. Cr back near baseline at 1.2. CTS on board will contact and update if there is any possibility of moving surgery up from 1/14    Review of Systems  Objective:     Vital Signs (Most Recent):  Temp: 98.2 °F (36.8 °C) (01/08/20 1146)  Pulse: 87 (01/08/20 1146)  Resp: 16 (01/08/20 0829)  BP: (!) 141/76 (01/08/20 1146)  SpO2: 95 % (01/08/20 1146) Vital Signs (24h Range):  Temp:  [96.5 °F (35.8 °C)-98.2 °F (36.8 °C)] 98.2 °F (36.8 °C)  Pulse:  [82-92] 87  Resp:  [16-20] 16  SpO2:  [95 %-98 %] 95 %  BP: (126-150)/(67-81) 141/76     Weight: 93.1 kg (205 lb 2.2 oz)  Body mass index is 35.21 kg/m².    Intake/Output Summary (Last 24 hours) at 1/8/2020 1151  Last data filed at 1/8/2020 0500  Gross per 24 hour   Intake --   Output 1300 ml   Net -1300 ml      Physical Exam   Constitutional: She is oriented to person, place, and time. She appears well-developed and well-nourished. She does not have a sickly appearance. She does not appear ill. No distress.   HENT:   Head: Normocephalic and atraumatic.   Eyes: Conjunctivae and EOM are normal.   Neck: Normal range of motion. Neck supple. No JVD present.   Cardiovascular: Regular rhythm and intact distal pulses.   Pulmonary/Chest: Effort normal. No respiratory distress. She has no rales. She  "exhibits no tenderness.   Abdominal: Soft. Bowel sounds are normal. She exhibits no distension. There is no tenderness. There is no rebound and no guarding.   Musculoskeletal: Normal range of motion. She exhibits no edema or tenderness.   Neurological: She is alert and oriented to person, place, and time. No sensory deficit.   Skin: Skin is warm and dry. She is not diaphoretic.   Psychiatric: She has a normal mood and affect. Her behavior is normal. Judgment and thought content normal.       Significant Labs:   CBC:   Recent Labs   Lab 01/07/20 0537 01/08/20  0553   WBC 6.59 6.42   HGB 12.1 10.4*   HCT 39.0 33.8*    254     CMP:   Recent Labs   Lab 01/07/20 0537 01/08/20  0553    141   K 3.7 4.0    107   CO2 23 26   * 126*   BUN 63* 32*   CREATININE 2.6* 1.2   CALCIUM 8.7 8.7   PROT 7.4 6.6   ALBUMIN 2.8* 2.6*   BILITOT 0.3 0.2   ALKPHOS 94 88   AST 13 15   ALT 11 11   ANIONGAP 14 8   EGFRNONAA 20.3* 51.7*       Assessment/Plan:      * Acute coronary syndrome  53 y/o AAF with known medical medical issues of chronic combined systolic and diastolic HF (EF 35% Grade II DD), CAD S/P MI 2010, Angiogram 12/18/19 mid LAD 90% stenosis, mid OM2 75% stenosis, and Ostial D1 70% stenosis LVDEP 25 mmHg, PAD S/P PTA with stents BLE 2015, IDDM (A1c 10.3%), HTN, HLD presenting with HARTMAN and nausea/ vomiting and "heart burn" like chest discomfort similar in characteristic for her last MI in 2010.     EKG - Sinus tachycardia without ST changes  1st Troponin - 0.176  BNP - 1,013    Seen by Cardiology in the ED who suggested ACS work up and CTS consult due to multi-vessel coronary disease  TTE: minimal changes since last echo. CVP 8 mmHg       PLAN:  -- Heparin gtt ACS protocol - for 48 hours stop date 1/4/20  --  mg in ED with 81 mg daily and Plavix load 300 mg X1 Holding Plavix in anticipation of CABG tentatively 1/7/20   -- Lasix 80mg IV BID - held   -- EKG PRN  -- continuous cardiac monitoring  -- " CTS Consulted - tentatively planned for CABG on 1/14,  involving hybrid off pump coronary artery bypass (LIMA-LAD) with postoperative percutaneous coronary intervention to the LCx -   -- Famotidine IV 20 mg BID - for prevention of GI Bleeds in ACS work up  -- High intensity atorvastatin 80 mg QHS  - c/w ASA, full potency statin.  - ACEI and carvedilol held due to ROSLYN and borderline low BP  - Plavix stopped per CTS recommendations     Weakness  PT/OT      Contrast dye induced nephropathy  *improving  PLAN:  -- current Cr 1. Increased to 3.4> 4.2> 2.6> 1.2  -- gave 500 cc bolus NS 1/4 - Judicious due to EF 35%  -- Avoid nephrotoxic medications if able  -- Hold furosemide, lisinopril  - UAR and retroperitoneal US unremarkable. Spun urine showing granular casts  - Nephrology on board      CAD (coronary artery disease)  CAD S/P MI 2010, Angiogram 12/18/19 mid LAD 90% stenosis, mid OM2 75% stenosis, and Ostial D1 70% stenosis LVDEP 25 mmHg    PLAN:  - CABG tentatively planned for Tuesday 1/14/2020  - c/w ASA, full potency statin   - ACEI and carvedilol held due to ROSLYN and borderline low BP  - Plavix stopped per CTS recommendations     Type 2 diabetes mellitus with hyperglycemia, with long-term current use of insulin  A1c 12/2019 - 10.3% 1/3/2020 - 9.7%    BG on admission was 347. Patient reports not taking her insulin for 2 days due to low P.O. Intake  Home regimen: Aspart 9 U WM + Lantus 50 U QHS    PLAN:  -- Per Endocrine recommendation; appreciate recs:  Levemir 20 units daily and novolog 3-5 units with meals with meals pending on PO intake   -- POC BG 4 X daily   -- LDSSI PRN  -- Goal -180      Acute on chronic combined systolic (congestive) and diastolic (congestive) heart failure  TTE 12/6/19: Moderately decreased left ventricular systolic function. The estimated ejection fraction is 35%. Grade II (moderate) left ventricular diastolic dysfunction consistent with pseudonormalization. Moderate global hypokinetic  wall motion. Normal right ventricular systolic function. Severe left atrial enlargement. Mild-to-moderate mitral regurgitation. Mild to moderate tricuspid regurgitation. The estimated PA systolic pressure is 49 mm Hg.Pulmonary hypertension present. Normal central venous pressure (3 mm Hg).    CXR 1/2/20: Mild interstitial edema with pulmonary vasculature congestion    TTE 1/3/20: Mild left ventricular enlargement. Moderately decreased left ventricular systolic function. The estimated ejection fraction is 35%. Grade II (moderate) left ventricular diastolic dysfunction consistent with pseudonormalization. Septal wall has abnormal motion. Local segmental wall motion abnormalities. Moderate left atrial enlargement. Normal right ventricular systolic function. Mild mitral regurgitation. Mild tricuspid regurgitation. Mild pulmonic regurgitation. Intermediate central venous pressure (8 mm Hg). The estimated PA systolic pressure is 46 mm Hg. Very small aneurysm in inferoapex visualized on image 47. The quantitatively dervived ejection fraction is 34%. Mild aortic regurgitation. Pulmonary hypertension present.    PLAN:  -- Carvedilol due to borderline low BP  -- Hold ACEi in setting of soft BP and contrast induced ROSLYN  -- IV lasix 80 mg QD - Held in the setting of ROSLYN  -- Strict I/Os  -- Daily standing weights  -- Low sodium (<2g) diet with 1500 cc fluid restriction        Mixed hyperlipidemia  Home med:  Atorvastatin 80 mg    Lipid panel 12/16/19: T cholesterol 244, HDL 46, , Triglyceride 135  Lipid panel 1/3/2020: T cholesterol 246 HDL 41 ; Triglycerides 115    PLAN:  -- Continue home statin  -- goal LDL <70     Non-intractable vomiting with nausea  On admission patient having two days of non-bloody non-bilious emesis with poor P.O. Intake    PLAN: - Improved  -- P.O. Phenergan 25 mg PRN  -- EKG daily to assess for QTc. 1/3/20 EKG QTc - 479  1/4/20 EKG Sinus rhythm with marked sinus arrythmia  Minimal voltage  criteria for LVH, QT prolongation at 464      Essential hypertension  Home: Lisinopril 40 mg QD, Coreg 12.5 mg BID    PLAN:  -- Hold anti-HTN while BP is soft and patient has contrast induced ROSLYN  -- Goal BP <140/90      Peripheral vascular disease  S/P PTA with stents BLE 2015    CTA Ab/pelvis with run off:  1. Diffuse atherosclerotic disease resulting in stenosis of the origins of the celiac artery, SMA, bilateral accessory renal arteries.  2. There is diffuse atherosclerotic disease in the bilateral lower extremities with bilateral SFA-popliteal artery stent occlusion.  The popliteal arteries reconstitute distally with collateral vessels.  3. Leiomyomatous uterus.      ROSLYN (acute kidney injury)          VTE Risk Mitigation (From admission, onward)         Ordered     heparin (porcine) injection 5,000 Units  Every 8 hours      01/07/20 0743     Place sequential compression device  Until discontinued      01/02/20 1518     IP VTE HIGH RISK PATIENT  Once      01/02/20 1518                Viral Lo MD  Department of Hospital Medicine   [facility]

## 2020-01-08 NOTE — TELEPHONE ENCOUNTER
Help this shows that the surgery is on 1/15 and pre-op 1/15 so what to do can not call an inpatient.

## 2020-01-08 NOTE — ASSESSMENT & PLAN NOTE
55 y/o AAF found to have a ROSLYN on the second day of admission. Urine sediment revealed multiple granular casts with occasional epithelial cells.     Was most likely due to recent hypotension on 1/4 from X3 sublingual nitro in addition to recent contrast induced nephropathy from CT w/ contrast exposure 1/2/20 and CTA on 1/3/20 and CTA on 12/6/19     - UA: 3+ Protein, 22 RBC, 2 WBCs  - Urine protein/creatinine ratio - 1.34, micro albumin/creatinine ratio - 812.5  - urine sediment: multiple granular casts, occasional epithelial cells     PLAN:  -- Strict I/O and chart   -- daily weights   -- Trend RFP daily to monitor BUN/Cr closely. Creatinine trend: 1.2>>1.7>>3.6> 4.2>2.>1.2  -- renally dose all medications.   -- Avoid nephrotoxic medications in setting of acute renal injury but should restart ACEi as renal function is improving and  patient has an elevated microalbumin/Cr ratio from chronic IDDM.   -- Encourage normal P.O. Fluid intake with 1500cc restriction due to EF 35% and recent ADHF  -- Nephrology will sign off. Patient should follow up with nephrology OP for long term management of elevated microalbumin in urine from IDDM

## 2020-01-09 LAB
ALBUMIN SERPL BCP-MCNC: 2.6 G/DL (ref 3.5–5.2)
ALP SERPL-CCNC: 82 U/L (ref 55–135)
ALT SERPL W/O P-5'-P-CCNC: 12 U/L (ref 10–44)
ANION GAP SERPL CALC-SCNC: 6 MMOL/L (ref 8–16)
AST SERPL-CCNC: 12 U/L (ref 10–40)
BASOPHILS # BLD AUTO: 0.06 K/UL (ref 0–0.2)
BASOPHILS NFR BLD: 0.8 % (ref 0–1.9)
BILIRUB SERPL-MCNC: 0.3 MG/DL (ref 0.1–1)
BUN SERPL-MCNC: 20 MG/DL (ref 6–20)
CALCIUM SERPL-MCNC: 9.4 MG/DL (ref 8.7–10.5)
CHLORIDE SERPL-SCNC: 106 MMOL/L (ref 95–110)
CO2 SERPL-SCNC: 26 MMOL/L (ref 23–29)
CREAT SERPL-MCNC: 1 MG/DL (ref 0.5–1.4)
DIFFERENTIAL METHOD: ABNORMAL
EOSINOPHIL # BLD AUTO: 0.2 K/UL (ref 0–0.5)
EOSINOPHIL NFR BLD: 2.3 % (ref 0–8)
ERYTHROCYTE [DISTWIDTH] IN BLOOD BY AUTOMATED COUNT: 12.9 % (ref 11.5–14.5)
EST. GFR  (AFRICAN AMERICAN): >60 ML/MIN/1.73 M^2
EST. GFR  (NON AFRICAN AMERICAN): >60 ML/MIN/1.73 M^2
GLUCOSE SERPL-MCNC: 93 MG/DL (ref 70–110)
HCT VFR BLD AUTO: 35 % (ref 37–48.5)
HGB BLD-MCNC: 10.8 G/DL (ref 12–16)
IMM GRANULOCYTES # BLD AUTO: 0.02 K/UL (ref 0–0.04)
IMM GRANULOCYTES NFR BLD AUTO: 0.3 % (ref 0–0.5)
LYMPHOCYTES # BLD AUTO: 2.5 K/UL (ref 1–4.8)
LYMPHOCYTES NFR BLD: 35.5 % (ref 18–48)
MAGNESIUM SERPL-MCNC: 2.2 MG/DL (ref 1.6–2.6)
MCH RBC QN AUTO: 30.3 PG (ref 27–31)
MCHC RBC AUTO-ENTMCNC: 30.9 G/DL (ref 32–36)
MCV RBC AUTO: 98 FL (ref 82–98)
MONOCYTES # BLD AUTO: 0.7 K/UL (ref 0.3–1)
MONOCYTES NFR BLD: 9.9 % (ref 4–15)
NEUTROPHILS # BLD AUTO: 3.6 K/UL (ref 1.8–7.7)
NEUTROPHILS NFR BLD: 51.2 % (ref 38–73)
NRBC BLD-RTO: 0 /100 WBC
PHOSPHATE SERPL-MCNC: 2.7 MG/DL (ref 2.7–4.5)
PLATELET # BLD AUTO: 270 K/UL (ref 150–350)
PMV BLD AUTO: 11.3 FL (ref 9.2–12.9)
POCT GLUCOSE: 109 MG/DL (ref 70–110)
POCT GLUCOSE: 139 MG/DL (ref 70–110)
POCT GLUCOSE: 159 MG/DL (ref 70–110)
POCT GLUCOSE: 79 MG/DL (ref 70–110)
POCT GLUCOSE: 91 MG/DL (ref 70–110)
POTASSIUM SERPL-SCNC: 4.4 MMOL/L (ref 3.5–5.1)
PROT SERPL-MCNC: 6.9 G/DL (ref 6–8.4)
RBC # BLD AUTO: 3.56 M/UL (ref 4–5.4)
SODIUM SERPL-SCNC: 138 MMOL/L (ref 136–145)
WBC # BLD AUTO: 7.1 K/UL (ref 3.9–12.7)

## 2020-01-09 PROCEDURE — 63600175 PHARM REV CODE 636 W HCPCS: Performed by: STUDENT IN AN ORGANIZED HEALTH CARE EDUCATION/TRAINING PROGRAM

## 2020-01-09 PROCEDURE — 84100 ASSAY OF PHOSPHORUS: CPT

## 2020-01-09 PROCEDURE — 63600175 PHARM REV CODE 636 W HCPCS: Performed by: NURSE PRACTITIONER

## 2020-01-09 PROCEDURE — 99232 SBSQ HOSP IP/OBS MODERATE 35: CPT | Mod: ,,, | Performed by: INTERNAL MEDICINE

## 2020-01-09 PROCEDURE — 83735 ASSAY OF MAGNESIUM: CPT

## 2020-01-09 PROCEDURE — 99232 PR SUBSEQUENT HOSPITAL CARE,LEVL II: ICD-10-PCS | Mod: ,,, | Performed by: NURSE PRACTITIONER

## 2020-01-09 PROCEDURE — 99232 PR SUBSEQUENT HOSPITAL CARE,LEVL II: ICD-10-PCS | Mod: ,,, | Performed by: INTERNAL MEDICINE

## 2020-01-09 PROCEDURE — 80053 COMPREHEN METABOLIC PANEL: CPT

## 2020-01-09 PROCEDURE — 25000003 PHARM REV CODE 250: Performed by: STUDENT IN AN ORGANIZED HEALTH CARE EDUCATION/TRAINING PROGRAM

## 2020-01-09 PROCEDURE — C9399 UNCLASSIFIED DRUGS OR BIOLOG: HCPCS | Performed by: NURSE PRACTITIONER

## 2020-01-09 PROCEDURE — 25000003 PHARM REV CODE 250: Performed by: INTERNAL MEDICINE

## 2020-01-09 PROCEDURE — 85025 COMPLETE CBC W/AUTO DIFF WBC: CPT

## 2020-01-09 PROCEDURE — 99232 SBSQ HOSP IP/OBS MODERATE 35: CPT | Mod: ,,, | Performed by: NURSE PRACTITIONER

## 2020-01-09 PROCEDURE — 11000001 HC ACUTE MED/SURG PRIVATE ROOM

## 2020-01-09 PROCEDURE — 36415 COLL VENOUS BLD VENIPUNCTURE: CPT

## 2020-01-09 RX ORDER — CARVEDILOL 6.25 MG/1
6.25 TABLET ORAL 2 TIMES DAILY WITH MEALS
Status: DISCONTINUED | OUTPATIENT
Start: 2020-01-09 | End: 2020-01-13

## 2020-01-09 RX ORDER — GABAPENTIN 300 MG/1
300 CAPSULE ORAL 3 TIMES DAILY
Status: DISCONTINUED | OUTPATIENT
Start: 2020-01-09 | End: 2020-01-14

## 2020-01-09 RX ORDER — INSULIN ASPART 100 [IU]/ML
4 INJECTION, SOLUTION INTRAVENOUS; SUBCUTANEOUS
Status: DISCONTINUED | OUTPATIENT
Start: 2020-01-09 | End: 2020-01-14

## 2020-01-09 RX ADMIN — INSULIN ASPART 6 UNITS: 100 INJECTION, SOLUTION INTRAVENOUS; SUBCUTANEOUS at 09:01

## 2020-01-09 RX ADMIN — HEPARIN SODIUM 5000 UNITS: 5000 INJECTION, SOLUTION INTRAVENOUS; SUBCUTANEOUS at 09:01

## 2020-01-09 RX ADMIN — GABAPENTIN 300 MG: 300 CAPSULE ORAL at 09:01

## 2020-01-09 RX ADMIN — ASPIRIN 81 MG CHEWABLE TABLET 81 MG: 81 TABLET CHEWABLE at 08:01

## 2020-01-09 RX ADMIN — ATORVASTATIN CALCIUM 80 MG: 20 TABLET, FILM COATED ORAL at 08:01

## 2020-01-09 RX ADMIN — INSULIN DETEMIR 16 UNITS: 100 INJECTION, SOLUTION SUBCUTANEOUS at 09:01

## 2020-01-09 RX ADMIN — HEPARIN SODIUM 5000 UNITS: 5000 INJECTION, SOLUTION INTRAVENOUS; SUBCUTANEOUS at 05:01

## 2020-01-09 RX ADMIN — FAMOTIDINE 20 MG: 20 TABLET ORAL at 08:01

## 2020-01-09 RX ADMIN — INSULIN ASPART 4 UNITS: 100 INJECTION, SOLUTION INTRAVENOUS; SUBCUTANEOUS at 06:01

## 2020-01-09 RX ADMIN — CARVEDILOL 6.25 MG: 6.25 TABLET, FILM COATED ORAL at 10:01

## 2020-01-09 RX ADMIN — CARVEDILOL 6.25 MG: 6.25 TABLET, FILM COATED ORAL at 06:01

## 2020-01-09 RX ADMIN — HEPARIN SODIUM 5000 UNITS: 5000 INJECTION, SOLUTION INTRAVENOUS; SUBCUTANEOUS at 02:01

## 2020-01-09 RX ADMIN — ESCITALOPRAM OXALATE 10 MG: 10 TABLET ORAL at 08:01

## 2020-01-09 RX ADMIN — GABAPENTIN 300 MG: 300 CAPSULE ORAL at 02:01

## 2020-01-09 NOTE — ASSESSMENT & PLAN NOTE
*improving  PLAN:  -- current Cr 1. Increased to 3.4> 4.2> 2.6> 1.2> 1.0  -- gave 500 cc bolus NS 1/4 - Judicious due to EF 35%  -- Avoid nephrotoxic medications if able  -- Hold furosemide, lisinopril  - UAR and retroperitoneal US unremarkable. Spun urine showing granular casts  - Nephrology on board

## 2020-01-09 NOTE — ASSESSMENT & PLAN NOTE
"53 y/o AAF with known medical medical issues of chronic combined systolic and diastolic HF (EF 35% Grade II DD), CAD S/P MI 2010, Angiogram 12/18/19 mid LAD 90% stenosis, mid OM2 75% stenosis, and Ostial D1 70% stenosis LVDEP 25 mmHg, PAD S/P PTA with stents BLE 2015, IDDM (A1c 10.3%), HTN, HLD presenting with HARTMAN and nausea/ vomiting and "heart burn" like chest discomfort similar in characteristic for her last MI in 2010.     EKG - Sinus tachycardia without ST changes  1st Troponin - 0.176  BNP - 1,013    Seen by Cardiology in the ED who suggested ACS work up and CTS consult due to multi-vessel coronary disease  TTE: minimal changes since last echo. CVP 8 mmHg       PLAN:  -- Heparin gtt ACS protocol - for 48 hours stop date 1/4/20  --  mg in ED with 81 mg daily and Plavix load 300 mg X1 Holding Plavix in anticipation of CABG tentatively 1/7/20   -- Lasix 80mg IV BID - d/c   -- EKG PRN  -- continuous cardiac monitoring  -- CTS Consulted - tentatively planned for CABG on 1/14,  involving hybrid off pump coronary artery bypass (LIMA-LAD) with postoperative percutaneous coronary intervention to the LCx -   -- Famotidine IV 20 mg BID - for prevention of GI Bleeds in ACS work up  -- High intensity atorvastatin 80 mg QHS  - c/w ASA, full potency statin.  - ACEI and carvedilol held due to ROSLYN and borderline low BP  - Plavix stopped per CTS recommendations   "

## 2020-01-09 NOTE — ASSESSMENT & PLAN NOTE
TTE 12/6/19: Moderately decreased left ventricular systolic function. The estimated ejection fraction is 35%. Grade II (moderate) left ventricular diastolic dysfunction consistent with pseudonormalization. Moderate global hypokinetic wall motion. Normal right ventricular systolic function. Severe left atrial enlargement. Mild-to-moderate mitral regurgitation. Mild to moderate tricuspid regurgitation. The estimated PA systolic pressure is 49 mm Hg.Pulmonary hypertension present. Normal central venous pressure (3 mm Hg).    CXR 1/2/20: Mild interstitial edema with pulmonary vasculature congestion    TTE 1/3/20: Mild left ventricular enlargement. Moderately decreased left ventricular systolic function. The estimated ejection fraction is 35%. Grade II (moderate) left ventricular diastolic dysfunction consistent with pseudonormalization. Septal wall has abnormal motion. Local segmental wall motion abnormalities. Moderate left atrial enlargement. Normal right ventricular systolic function. Mild mitral regurgitation. Mild tricuspid regurgitation. Mild pulmonic regurgitation. Intermediate central venous pressure (8 mm Hg). The estimated PA systolic pressure is 46 mm Hg. Very small aneurysm in inferoapex visualized on image 47. The quantitatively dervived ejection fraction is 34%. Mild aortic regurgitation. Pulmonary hypertension present.    PLAN:  -- Carvedilol restarted at half dose (6.25mg BID)  -- Hold ACEi in setting of soft BP and contrast induced ROSLYN  -- IV lasix 80 mg QD - Held in the setting of ROSLYN  -- Strict I/Os  -- Daily standing weights  -- Low sodium (<2g) diet with 1500 cc fluid restriction

## 2020-01-09 NOTE — SUBJECTIVE & OBJECTIVE
"Interval HPI:   Overnight events:   BG is below goal on current SQ insulin regimen. NAEON    Eatin%  Nausea: No  Hypoglycemia and intervention: No  Fever: No  TPN and/or TF: No  If yes, type of TF/TPN and rate: none    BP (!) 148/76 (BP Location: Left arm, Patient Position: Sitting)   Pulse 88   Temp 98.6 °F (37 °C) (Oral)   Resp 14   Ht 5' 4" (1.626 m)   Wt 93.1 kg (205 lb 2.2 oz)   LMP 2015 (Exact Date)   SpO2 98%   Breastfeeding? No   BMI 35.21 kg/m²     Labs Reviewed and Include    Recent Labs   Lab 20  0341   GLU 93   CALCIUM 9.4   ALBUMIN 2.6*   PROT 6.9      K 4.4   CO2 26      BUN 20   CREATININE 1.0   ALKPHOS 82   ALT 12   AST 12   BILITOT 0.3     Lab Results   Component Value Date    WBC 7.10 2020    HGB 10.8 (L) 2020    HCT 35.0 (L) 2020    MCV 98 2020     2020     Recent Labs   Lab 20  1701   TSH 0.562     Lab Results   Component Value Date    HGBA1C 9.7 (H) 2020       Nutritional status:   Body mass index is 35.21 kg/m².  Lab Results   Component Value Date    ALBUMIN 2.6 (L) 2020    ALBUMIN 2.6 (L) 2020    ALBUMIN 2.8 (L) 2020     No results found for: PREALBUMIN    Estimated Creatinine Clearance: 72 mL/min (based on SCr of 1 mg/dL).    Accu-Checks  Recent Labs     20  0904 20  1159 20  1751 20  2143 20  0828 20  1256 20  1736 20  0554   POCTGLUCOSE 158* 167* 210* 116* 159* 146* 62* 138* 153* 79       Current Medications and/or Treatments Impacting Glycemic Control  Immunotherapy:    Immunosuppressants     None        Steroids:   Hormones (From admission, onward)    None        Pressors:    Autonomic Drugs (From admission, onward)    None        Hyperglycemia/Diabetes Medications:   Antihyperglycemics (From admission, onward)    Start     Stop Route Frequency Ordered    20 2100  insulin detemir U-100 pen 16 Units   "    -- SubQ Nightly 01/09/20 0931    01/07/20 1130  insulin aspart U-100 pen 4-6 Units      -- SubQ 3 times daily with meals 01/07/20 0745    01/04/20 0846  insulin aspart U-100 pen 0-5 Units      -- SubQ Before meals & nightly PRN 01/04/20 0746

## 2020-01-09 NOTE — SUBJECTIVE & OBJECTIVE
Interval History: NAEO. Patient renal function continues to improve. Will remain inpatient until surgery on 1/14. Labs every other day    Review of Systems  Objective:     Vital Signs (Most Recent):  Temp: 98.6 °F (37 °C) (01/09/20 1135)  Pulse: 93 (01/09/20 1135)  Resp: 18 (01/09/20 1135)  BP: (!) 107/55 (01/09/20 1135)  SpO2: (!) 93 % (01/09/20 1135) Vital Signs (24h Range):  Temp:  [96.6 °F (35.9 °C)-98.6 °F (37 °C)] 98.6 °F (37 °C)  Pulse:  [80-93] 93  Resp:  [14-20] 18  SpO2:  [93 %-100 %] 93 %  BP: (107-156)/(55-89) 107/55     Weight: 93.1 kg (205 lb 2.2 oz)  Body mass index is 35.21 kg/m².    Intake/Output Summary (Last 24 hours) at 1/9/2020 1419  Last data filed at 1/9/2020 0100  Gross per 24 hour   Intake 180 ml   Output 1200 ml   Net -1020 ml      Physical Exam   Constitutional: She is oriented to person, place, and time. She appears well-developed and well-nourished. She does not have a sickly appearance. She does not appear ill. No distress.   HENT:   Head: Normocephalic and atraumatic.   Eyes: Conjunctivae and EOM are normal.   Neck: Normal range of motion. Neck supple. No JVD present.   Cardiovascular: Regular rhythm and intact distal pulses.   Pulmonary/Chest: Effort normal. No respiratory distress. She has no rales. She exhibits no tenderness.   Abdominal: Soft. Bowel sounds are normal. She exhibits no distension. There is no tenderness. There is no rebound and no guarding.   Musculoskeletal: Normal range of motion. She exhibits no edema or tenderness.   Neurological: She is alert and oriented to person, place, and time. No sensory deficit.   Skin: Skin is warm and dry. She is not diaphoretic.   Psychiatric: She has a normal mood and affect. Her behavior is normal. Judgment and thought content normal.       Significant Labs:   CBC:   Recent Labs   Lab 01/08/20  0553 01/09/20  0341   WBC 6.42 7.10   HGB 10.4* 10.8*   HCT 33.8* 35.0*    270     CMP:   Recent Labs   Lab 01/08/20  0553  01/09/20  0341    138   K 4.0 4.4    106   CO2 26 26   * 93   BUN 32* 20   CREATININE 1.2 1.0   CALCIUM 8.7 9.4   PROT 6.6 6.9   ALBUMIN 2.6* 2.6*   BILITOT 0.2 0.3   ALKPHOS 88 82   AST 15 12   ALT 11 12   ANIONGAP 8 6*   EGFRNONAA 51.7* >60.0

## 2020-01-09 NOTE — PROGRESS NOTES
[facility] Hospital Medicine  Progress Note    Patient Name: Suyapa Connelly  MRN: [id number]  Patient Class: IP- Inpatient   Admission Date: 1/2/2020  Length of Stay: 7 days  Attending Physician: Lulú Macias MD  Primary Care Provider: Erlinda Rahman NP    [facility] Medicine Team: [facility] [place] [facility] MED 2 Viral Lo MD    Subjective:     Principal Problem:Acute coronary syndrome       HPI:  Ms. Connelly is a 53 y/o AAF with known medical medical issues of chronic combined systolic and diastolic HF (EF 35% Grade II DD), CAD S/P MI 2010, Angiogram 12/18/19 mid LAD 90% stenosis, mid OM2 75% stenosis, and Ostial D1 70% stenosis LVDEP 25 mmHg, PAD S/P PTA with stents BLE 2015, IDDM (A1c 10.3%), HTN, HLD, GERD, Anxiety/ Depression. Patient present with a 2 day history of nausea and with multiple episodes of non-bilious non-bloody emesis and couple of episodes of watery non-bloody diarrhea. Patient reports initially feeling HARTMAN without associated chest pain/tightness for the past week before progressively worsening to the GI symptoms described. She reports having a 7 lb weight gain (dry weight unknown) and only able to walk less than 20-30 ft before feeling short of breath. She reports that 5-10 minute rest resolves her dyspnea and she never has shortness of breath at rest. She reports feeling similar symptoms to this when she was admitted to the hospital on 12/6/19 for ADHF. On Monday 12/30 she doubled her home torsemide dosage to help reduce the excess water weight but reports that it did not help much. When he GI symptoms developed on Tuesday, she reports being unable to tolerate anything P.O. and subsequently stopped taking all of her home medications including her home insulin. What prompted her to come into the hospital was a fluttering feeling in her chest associated with some diaphoresis. She reports not remembering the symptoms she felt when she was having her MI in 2010 but  at  "bedside reported that she had nausea and vomiting leading up to her MI and she collapsed and lost consciousness and then later once admitted to the hospital was told she had had a heart attack. Currently the patient reports feeling nauseated, diaphoretic, a mild headache, epigastric pain, with a vague "heart burn" like feeling in her chest. She denies light headedness, chest tightness, dyspnea, palpitations, dysuria, constipation, edema. She denies any recent sick contacts and fevers at home.    In the ED, , EKG showed sinus tachycardia without ST changes, 1st troponin 0.176, BNP 1,013, CXR - mild interstitial edema with pulmonary vascular congestion. She was seen by cardiology who suggested an ACS work up due to her recent angiogram showing vessel blockages and her being a diabetic with similar vague symptoms from last MI. The also suggested for CTS to be consulted to assess for CABG. Patient was admitted to hospital medicine Team 2 for further management and work up.      Overview/Hospital Course:  53 y/o AAF with known medical medical issues of chronic combined systolic and diastolic HF (EF 35% Grade II DD), CAD S/P MI 2010, Angiogram 12/18/19 mid LAD 90% stenosis, mid OM2 75% stenosis, and Ostial D1 70% stenosis LVDEP 25 mmHg, PAD S/P PTA with stents BLE 2015, IDDM (A1c 10.3%), HTN, HLD, GERD, Anxiety/ Depression. Worked up for NSTEMI/ACS due to Chest discomfort nonspecific ST changes on EKG and elevated troponin. Patient had an MI in 2002 with similar prodrome/symptoms leading up to the acute coronary event. Patient tentatively planned for CABG depending on the CTA results and her repeat HbA1C for 1/7/20, plan for surgery involving hybrid off pump coronary artery bypass (LIMA-LAD) with postoperative percutaneous coronary intervention to the LCx. Will hold Plavix in anticipation of surgery and will start heparin gtt after 48 hour per Cards recommendations. Patient developed 10/10 right sided non radiating " sharp chest pain 1/4/20 after eating. Ordered Troponin - 0.188 -->0.169, CXR - Interval improvement of opacity in the right lower lung zone not pneumothorax or widened midiastineum, EKG - No ST changes to rule out MI and gave subliqual nitro X3. Gave a GI cocktail to rule out indigestion. Patient felt relief after burping. Endocrinology following for optimal diabetes management in anticipation of surgery per CTS request. 1/5/2020: Patient denies chest pain. She developed ROSLYN on CKD which is most likely multifactorial. Urine electrolytes, U/s retroperitoneal unremarkable. Nephrology on board with Cr trending up  1.2>3.4>4.2> 2.6> 1.2> 1.0    Interval History: NAEO. Patient renal function continues to improve. Will remain inpatient until surgery on 1/14. Labs every other day    Review of Systems  Objective:     Vital Signs (Most Recent):  Temp: 98.6 °F (37 °C) (01/09/20 1135)  Pulse: 93 (01/09/20 1135)  Resp: 18 (01/09/20 1135)  BP: (!) 107/55 (01/09/20 1135)  SpO2: (!) 93 % (01/09/20 1135) Vital Signs (24h Range):  Temp:  [96.6 °F (35.9 °C)-98.6 °F (37 °C)] 98.6 °F (37 °C)  Pulse:  [80-93] 93  Resp:  [14-20] 18  SpO2:  [93 %-100 %] 93 %  BP: (107-156)/(55-89) 107/55     Weight: 93.1 kg (205 lb 2.2 oz)  Body mass index is 35.21 kg/m².    Intake/Output Summary (Last 24 hours) at 1/9/2020 1419  Last data filed at 1/9/2020 0100  Gross per 24 hour   Intake 180 ml   Output 1200 ml   Net -1020 ml      Physical Exam   Constitutional: She is oriented to person, place, and time. She appears well-developed and well-nourished. She does not have a sickly appearance. She does not appear ill. No distress.   HENT:   Head: Normocephalic and atraumatic.   Eyes: Conjunctivae and EOM are normal.   Neck: Normal range of motion. Neck supple. No JVD present.   Cardiovascular: Regular rhythm and intact distal pulses.   Pulmonary/Chest: Effort normal. No respiratory distress. She has no rales. She exhibits no tenderness.   Abdominal: Soft.  "Bowel sounds are normal. She exhibits no distension. There is no tenderness. There is no rebound and no guarding.   Musculoskeletal: Normal range of motion. She exhibits no edema or tenderness.   Neurological: She is alert and oriented to person, place, and time. No sensory deficit.   Skin: Skin is warm and dry. She is not diaphoretic.   Psychiatric: She has a normal mood and affect. Her behavior is normal. Judgment and thought content normal.       Significant Labs:   CBC:   Recent Labs   Lab 01/08/20  0553 01/09/20  0341   WBC 6.42 7.10   HGB 10.4* 10.8*   HCT 33.8* 35.0*    270     CMP:   Recent Labs   Lab 01/08/20  0553 01/09/20  0341    138   K 4.0 4.4    106   CO2 26 26   * 93   BUN 32* 20   CREATININE 1.2 1.0   CALCIUM 8.7 9.4   PROT 6.6 6.9   ALBUMIN 2.6* 2.6*   BILITOT 0.2 0.3   ALKPHOS 88 82   AST 15 12   ALT 11 12   ANIONGAP 8 6*   EGFRNONAA 51.7* >60.0           Assessment/Plan:      * Acute coronary syndrome  55 y/o AAF with known medical medical issues of chronic combined systolic and diastolic HF (EF 35% Grade II DD), CAD S/P MI 2010, Angiogram 12/18/19 mid LAD 90% stenosis, mid OM2 75% stenosis, and Ostial D1 70% stenosis LVDEP 25 mmHg, PAD S/P PTA with stents BLE 2015, IDDM (A1c 10.3%), HTN, HLD presenting with HARTMAN and nausea/ vomiting and "heart burn" like chest discomfort similar in characteristic for her last MI in 2010.     EKG - Sinus tachycardia without ST changes  1st Troponin - 0.176  BNP - 1,013    Seen by Cardiology in the ED who suggested ACS work up and CTS consult due to multi-vessel coronary disease  TTE: minimal changes since last echo. CVP 8 mmHg       PLAN:  -- Heparin gtt ACS protocol - for 48 hours stop date 1/4/20  --  mg in ED with 81 mg daily and Plavix load 300 mg X1 Holding Plavix in anticipation of CABG tentatively 1/7/20   -- Lasix 80mg IV BID - d/c   -- EKG PRN  -- continuous cardiac monitoring  -- CTS Consulted - tentatively planned for " CABG on 1/14,  involving hybrid off pump coronary artery bypass (LIMA-LAD) with postoperative percutaneous coronary intervention to the LCx -   -- Famotidine IV 20 mg BID - for prevention of GI Bleeds in ACS work up  -- High intensity atorvastatin 80 mg QHS  - c/w ASA, full potency statin.  - ACEI and carvedilol held due to ROSLYN and borderline low BP  - Plavix stopped per CTS recommendations     Weakness  PT/OT      Contrast dye induced nephropathy  *improving  PLAN:  -- current Cr 1. Increased to 3.4> 4.2> 2.6> 1.2> 1.0  -- gave 500 cc bolus NS 1/4 - Judicious due to EF 35%  -- Avoid nephrotoxic medications if able  -- Hold furosemide, lisinopril  - UAR and retroperitoneal US unremarkable. Spun urine showing granular casts  - Nephrology on board      CAD (coronary artery disease)  CAD S/P MI 2010, Angiogram 12/18/19 mid LAD 90% stenosis, mid OM2 75% stenosis, and Ostial D1 70% stenosis LVDEP 25 mmHg    PLAN:  - CABG tentatively planned for Tuesday 1/14/2020  - c/w ASA, full potency statin   - ACEI and carvedilol held due to ROSLYN and borderline low BP  - Plavix stopped per CTS recommendations     Type 2 diabetes mellitus with hyperglycemia, with long-term current use of insulin  A1c 12/2019 - 10.3% 1/3/2020 - 9.7%    BG on admission was 347. Patient reports not taking her insulin for 2 days due to low P.O. Intake  Home regimen: Aspart 9 U WM + Lantus 50 U QHS    PLAN:  -- Per Endocrine recommendation; appreciate recs:  Levemir 20 units daily and novolog 3-5 units with meals with meals pending on PO intake   -- POC BG 4 X daily   -- LDSSI PRN  -- Goal -180      Acute on chronic combined systolic (congestive) and diastolic (congestive) heart failure  TTE 12/6/19: Moderately decreased left ventricular systolic function. The estimated ejection fraction is 35%. Grade II (moderate) left ventricular diastolic dysfunction consistent with pseudonormalization. Moderate global hypokinetic wall motion. Normal right  ventricular systolic function. Severe left atrial enlargement. Mild-to-moderate mitral regurgitation. Mild to moderate tricuspid regurgitation. The estimated PA systolic pressure is 49 mm Hg.Pulmonary hypertension present. Normal central venous pressure (3 mm Hg).    CXR 1/2/20: Mild interstitial edema with pulmonary vasculature congestion    TTE 1/3/20: Mild left ventricular enlargement. Moderately decreased left ventricular systolic function. The estimated ejection fraction is 35%. Grade II (moderate) left ventricular diastolic dysfunction consistent with pseudonormalization. Septal wall has abnormal motion. Local segmental wall motion abnormalities. Moderate left atrial enlargement. Normal right ventricular systolic function. Mild mitral regurgitation. Mild tricuspid regurgitation. Mild pulmonic regurgitation. Intermediate central venous pressure (8 mm Hg). The estimated PA systolic pressure is 46 mm Hg. Very small aneurysm in inferoapex visualized on image 47. The quantitatively dervived ejection fraction is 34%. Mild aortic regurgitation. Pulmonary hypertension present.    PLAN:  -- Carvedilol restarted at half dose (6.25mg BID)  -- Hold ACEi in setting of soft BP and contrast induced ROSLYN  -- IV lasix 80 mg QD - Held in the setting of ROSLYN  -- Strict I/Os  -- Daily standing weights  -- Low sodium (<2g) diet with 1500 cc fluid restriction        Mixed hyperlipidemia  Home med:  Atorvastatin 80 mg    Lipid panel 12/16/19: T cholesterol 244, HDL 46, , Triglyceride 135  Lipid panel 1/3/2020: T cholesterol 246 HDL 41 ; Triglycerides 115    PLAN:  -- Continue home statin  -- goal LDL <70     Non-intractable vomiting with nausea  On admission patient having two days of non-bloody non-bilious emesis with poor P.O. Intake    PLAN: - Improved  -- P.O. Phenergan 25 mg PRN  -- EKG daily to assess for QTc. 1/3/20 EKG QTc - 479  1/4/20 EKG Sinus rhythm with marked sinus arrythmia  Minimal voltage criteria for  LVH, QT prolongation at 464      Essential hypertension  Home: Lisinopril 40 mg QD, Coreg 12.5 mg BID    PLAN:  -- Hold anti-HTN while BP is soft and patient has contrast induced ROSLYN  -- Goal BP <140/90      Peripheral vascular disease  S/P PTA with stents BLE 2015    CTA Ab/pelvis with run off:  1. Diffuse atherosclerotic disease resulting in stenosis of the origins of the celiac artery, SMA, bilateral accessory renal arteries.  2. There is diffuse atherosclerotic disease in the bilateral lower extremities with bilateral SFA-popliteal artery stent occlusion.  The popliteal arteries reconstitute distally with collateral vessels.  3. Leiomyomatous uterus.      ROSLYN (acute kidney injury)        VTE Risk Mitigation (From admission, onward)         Ordered     heparin (porcine) injection 5,000 Units  Every 8 hours      01/07/20 0743     Place sequential compression device  Until discontinued      01/02/20 1518     IP VTE HIGH RISK PATIENT  Once      01/02/20 1518                  Viral Lo MD  Department of Hospital Medicine   [facility]

## 2020-01-09 NOTE — PLAN OF CARE
Patient is lying in bed AAOX4 with no complaint of pain or discomfort.  Bed is low and locked, and call light is within reach.  Vitals are within normal reach, and fall precautions are in place.  Care plan has been reviewed.  Will continue to monitor until arrival of day shift.

## 2020-01-09 NOTE — NURSING
"Pt stated MD told patient she "could go downstairs to see my baby."  Referring to her dog, Sugar.  Per patient, MD stated they would put in a nursing communication order as patient is here for ACS, significant cardiac hx, planned CABG.  Will await order before allowing patient to leave floor.  "

## 2020-01-09 NOTE — PROGRESS NOTES
"[facility]  Endocrinology  Progress Note    Admit Date: 2020     Reason for Consult: Management of T2DM, Hyperglycemia     Surgical Procedure and Date: n/a    Diabetes diagnosis year:      Home Diabetes Medications:  Lantus 50 units daily and novolog 9 units with meals   Lab Results   Component Value Date    HGBA1C 9.7 (H) 2020         How often checking glucose at home? 1-3 x day   BG readings on regimen: 100-200 typically; sometimes in 300s   Hypoglycemia on the regimen?  Yes occasionally - usually overnight   Missed doses on regimen?  Yes    Diabetes Complications include:     Hyperglycemia, Hypoglycemia  and Diabetic peripheral neuropathy     Complicating diabetes co morbidities:   History of MI and CHF      HPI:   Patient is a 54 y.o. female with a diagnosis of uncontrolled type 2 DM. Also with CHF, PAD, HTN, HLD, and CAD. Patient presented with chest pain and diaphoresis. Also with nauseated, diaphoretic, a mild headache, epigastric pain, with a vague "heart burn" like feeling in her chest. Patient admitted for treatment and further workup. Endocrinology consulted for BG/ DM management.             Interval HPI:   Overnight events:   BG is below goal on current SQ insulin regimen. NAEON    Eatin%  Nausea: No  Hypoglycemia and intervention: No  Fever: No  TPN and/or TF: No  If yes, type of TF/TPN and rate: none    BP (!) 148/76 (BP Location: Left arm, Patient Position: Sitting)   Pulse 88   Temp 98.6 °F (37 °C) (Oral)   Resp 14   Ht 5' 4" (1.626 m)   Wt 93.1 kg (205 lb 2.2 oz)   LMP 2015 (Exact Date)   SpO2 98%   Breastfeeding? No   BMI 35.21 kg/m²      Labs Reviewed and Include    Recent Labs   Lab 20  0341   GLU 93   CALCIUM 9.4   ALBUMIN 2.6*   PROT 6.9      K 4.4   CO2 26      BUN 20   CREATININE 1.0   ALKPHOS 82   ALT 12   AST 12   BILITOT 0.3     Lab Results   Component Value Date    WBC 7.10 2020    HGB 10.8 (L) 2020 "    HCT 35.0 (L) 01/09/2020    MCV 98 01/09/2020     01/09/2020     Recent Labs   Lab 01/02/20  1701   TSH 0.562     Lab Results   Component Value Date    HGBA1C 9.7 (H) 01/03/2020       Nutritional status:   Body mass index is 35.21 kg/m².  Lab Results   Component Value Date    ALBUMIN 2.6 (L) 01/09/2020    ALBUMIN 2.6 (L) 01/08/2020    ALBUMIN 2.8 (L) 01/07/2020     No results found for: PREALBUMIN    Estimated Creatinine Clearance: 72 mL/min (based on SCr of 1 mg/dL).    Accu-Checks  Recent Labs     01/06/20  2014 01/07/20  0904 01/07/20  1159 01/07/20  1751 01/07/20  2143 01/08/20  0828 01/08/20  1256 01/08/20  1736 01/08/20 2011 01/09/20  0554   POCTGLUCOSE 158* 167* 210* 116* 159* 146* 62* 138* 153* 79       Current Medications and/or Treatments Impacting Glycemic Control  Immunotherapy:    Immunosuppressants     None        Steroids:   Hormones (From admission, onward)    None        Pressors:    Autonomic Drugs (From admission, onward)    None        Hyperglycemia/Diabetes Medications:   Antihyperglycemics (From admission, onward)    Start     Stop Route Frequency Ordered    01/09/20 2100  insulin detemir U-100 pen 16 Units      -- SubQ Nightly 01/09/20 0931    01/07/20 1130  insulin aspart U-100 pen 4-6 Units      -- SubQ 3 times daily with meals 01/07/20 0745    01/04/20 0846  insulin aspart U-100 pen 0-5 Units      -- SubQ Before meals & nightly PRN 01/04/20 0746          ASSESSMENT and PLAN    * Acute coronary syndrome  Managed per primary team  Avoid hypoglycemia        Type 2 diabetes mellitus with hyperglycemia, with long-term current use of insulin  BG goal 140 - 180     BG is below goal. Recommend the following:     Decrease Levemir to 16 units Nightly. 20% decrease  Decrease Novolog to 4 units TIDWM. BG is below goal, and appetite remains labile. Patient is at increase risk for hypoglycemia.   Low Dose SQ Insulin Correction Scale.  BG Monitoring AC/HS     ** Please call Endocrine for any BG  related issues **  ** Please notify Endocrine for any change and/or advance in diet**    Discharge Planning:     TBD. Please notify endocrinology prior to discharge. Patient will need adjustments to home insulin regimen.       ROSLYN (acute kidney injury)  Caution with insulin stacking            Cody Jameson NP  Endocrinology  [facility]

## 2020-01-09 NOTE — ASSESSMENT & PLAN NOTE
BG goal 140 - 180     BG is below goal. Recommend the following:     Decrease Levemir to 16 units Nightly. 20% decrease  Decrease Novolog to 4 units TIDWM. BG is below goal, and appetite remains labile. Patient is at increase risk for hypoglycemia.   Low Dose SQ Insulin Correction Scale.  BG Monitoring AC/HS     ** Please call Endocrine for any BG related issues **  ** Please notify Endocrine for any change and/or advance in diet**    Discharge Planning:     TBD. Please notify endocrinology prior to discharge. Patient will need adjustments to home insulin regimen.

## 2020-01-10 LAB
POCT GLUCOSE: 112 MG/DL (ref 70–110)
POCT GLUCOSE: 120 MG/DL (ref 70–110)
POCT GLUCOSE: 142 MG/DL (ref 70–110)
POCT GLUCOSE: 146 MG/DL (ref 70–110)

## 2020-01-10 PROCEDURE — 99232 PR SUBSEQUENT HOSPITAL CARE,LEVL II: ICD-10-PCS | Mod: ,,, | Performed by: INTERNAL MEDICINE

## 2020-01-10 PROCEDURE — 63600175 PHARM REV CODE 636 W HCPCS: Performed by: NURSE PRACTITIONER

## 2020-01-10 PROCEDURE — 11000001 HC ACUTE MED/SURG PRIVATE ROOM

## 2020-01-10 PROCEDURE — 63600175 PHARM REV CODE 636 W HCPCS: Performed by: STUDENT IN AN ORGANIZED HEALTH CARE EDUCATION/TRAINING PROGRAM

## 2020-01-10 PROCEDURE — 25000003 PHARM REV CODE 250: Performed by: INTERNAL MEDICINE

## 2020-01-10 PROCEDURE — 25000003 PHARM REV CODE 250: Performed by: STUDENT IN AN ORGANIZED HEALTH CARE EDUCATION/TRAINING PROGRAM

## 2020-01-10 PROCEDURE — 99232 SBSQ HOSP IP/OBS MODERATE 35: CPT | Mod: ,,, | Performed by: INTERNAL MEDICINE

## 2020-01-10 RX ORDER — ENOXAPARIN SODIUM 100 MG/ML
40 INJECTION SUBCUTANEOUS EVERY 24 HOURS
Status: DISCONTINUED | OUTPATIENT
Start: 2020-01-10 | End: 2020-01-13

## 2020-01-10 RX ADMIN — GABAPENTIN 300 MG: 300 CAPSULE ORAL at 08:01

## 2020-01-10 RX ADMIN — GABAPENTIN 300 MG: 300 CAPSULE ORAL at 09:01

## 2020-01-10 RX ADMIN — CARVEDILOL 6.25 MG: 6.25 TABLET, FILM COATED ORAL at 08:01

## 2020-01-10 RX ADMIN — INSULIN DETEMIR 16 UNITS: 100 INJECTION, SOLUTION SUBCUTANEOUS at 09:01

## 2020-01-10 RX ADMIN — INSULIN ASPART 4 UNITS: 100 INJECTION, SOLUTION INTRAVENOUS; SUBCUTANEOUS at 01:01

## 2020-01-10 RX ADMIN — ENOXAPARIN SODIUM 40 MG: 100 INJECTION SUBCUTANEOUS at 04:01

## 2020-01-10 RX ADMIN — ASPIRIN 81 MG CHEWABLE TABLET 81 MG: 81 TABLET CHEWABLE at 08:01

## 2020-01-10 RX ADMIN — GABAPENTIN 300 MG: 300 CAPSULE ORAL at 03:01

## 2020-01-10 RX ADMIN — INSULIN ASPART 4 UNITS: 100 INJECTION, SOLUTION INTRAVENOUS; SUBCUTANEOUS at 08:01

## 2020-01-10 RX ADMIN — FAMOTIDINE 20 MG: 20 TABLET ORAL at 08:01

## 2020-01-10 RX ADMIN — HEPARIN SODIUM 5000 UNITS: 5000 INJECTION, SOLUTION INTRAVENOUS; SUBCUTANEOUS at 06:01

## 2020-01-10 RX ADMIN — ESCITALOPRAM OXALATE 10 MG: 10 TABLET ORAL at 08:01

## 2020-01-10 RX ADMIN — ATORVASTATIN CALCIUM 80 MG: 20 TABLET, FILM COATED ORAL at 08:01

## 2020-01-10 RX ADMIN — INSULIN ASPART 4 UNITS: 100 INJECTION, SOLUTION INTRAVENOUS; SUBCUTANEOUS at 04:01

## 2020-01-10 RX ADMIN — CARVEDILOL 6.25 MG: 6.25 TABLET, FILM COATED ORAL at 04:01

## 2020-01-10 NOTE — PLAN OF CARE
Pt aaox4. V/s stable. ambulates in room independently. Family at bedside. No complaints of pain or discomfort. Blood glucose wdl. NSR on telemetry. Will continue to monitor and interventions as appropriate.

## 2020-01-10 NOTE — PLAN OF CARE
POC reviewed with pt; verbalized understanding; assessment per flowsheet; meds adminstered per MAR; VSS throughout shift; denies Cp/palpitation; glucose monitoring done; insulin adminstered per MAR; safety precautions maintained; 0 falls, injury or acute events this shift; WCTM

## 2020-01-10 NOTE — CARE UPDATE
BG goal 140 - 180    BG is now within goal on current SQ insulin regimen. NAEON.   Diet [facility]; Consistent Carbohydrate  Diet NPO Except for: Medication, Sips with Medication       Continue Levemir to 16 units Nightly. 20% decrease  Continue Novolog to 4 units TIDWM.  Low Dose SQ Insulin Correction Scale.  BG Monitoring AC/HS      ** Please call Endocrine for any BG related issues **  ** Please notify Endocrine for any change and/or advance in diet**     Discharge Planning:     TBD. Please notify endocrinology prior to discharge. Patient will need adjustments to home insulin regimen.

## 2020-01-10 NOTE — SUBJECTIVE & OBJECTIVE
Interval History: NAEO. Patient renal function continues to improve. Will remain inpatient until surgery on 1/14. Labs every other day    Review of Systems    Objective:     Vital Signs (Most Recent):  Temp: 98.1 °F (36.7 °C) (01/10/20 1058)  Pulse: 79 (01/10/20 1100)  Resp: 18 (01/10/20 1058)  BP: 137/67 (01/10/20 1124)  SpO2: 99 % (01/10/20 1058) Vital Signs (24h Range):  Temp:  [97.3 °F (36.3 °C)-98.7 °F (37.1 °C)] 98.1 °F (36.7 °C)  Pulse:  [74-87] 79  Resp:  [16-20] 18  SpO2:  [95 %-99 %] 99 %  BP: (117-156)/(59-74) 137/67     Weight: 92.1 kg (203 lb)  Body mass index is 34.84 kg/m².  No intake or output data in the 24 hours ending 01/10/20 1520   Physical Exam   Constitutional: She is oriented to person, place, and time. She appears well-developed and well-nourished. She does not have a sickly appearance. She does not appear ill. No distress.   HENT:   Head: Normocephalic and atraumatic.   Eyes: Conjunctivae and EOM are normal.   Neck: Normal range of motion. Neck supple. No JVD present.   Cardiovascular: Regular rhythm and intact distal pulses.   Pulmonary/Chest: Effort normal. No respiratory distress. She has no rales. She exhibits no tenderness.   Abdominal: Soft. Bowel sounds are normal. She exhibits no distension. There is no tenderness. There is no rebound and no guarding.   Musculoskeletal: Normal range of motion. She exhibits no edema or tenderness.   Neurological: She is alert and oriented to person, place, and time. No sensory deficit.   Skin: Skin is warm and dry. She is not diaphoretic.   Psychiatric: She has a normal mood and affect. Her behavior is normal. Judgment and thought content normal.       Significant Labs:   CBC:   Recent Labs   Lab 01/09/20  0341   WBC 7.10   HGB 10.8*   HCT 35.0*        CMP:   Recent Labs   Lab 01/09/20  0341      K 4.4      CO2 26   GLU 93   BUN 20   CREATININE 1.0   CALCIUM 9.4   PROT 6.9   ALBUMIN 2.6*   BILITOT 0.3   ALKPHOS 82   AST 12   ALT 12    ANIONGAP 6*   EGFRNONAA >60.0

## 2020-01-10 NOTE — PROGRESS NOTES
[facility] Hospital Medicine  Progress Note    Patient Name: Suyapa Connelly  MRN: [id number]  Patient Class: IP- Inpatient   Admission Date: 1/2/2020  Length of Stay: 8 days  Attending Physician: Lulú Macias MD  Primary Care Provider: Erlinda Rahman NP    [facility] Medicine Team: [facility] MED 2 Mary Lou Wolf MD    Subjective:     Principal Problem:Acute coronary syndrome        HPI:  Ms. Connelly is a 55 y/o AAF with known medical medical issues of chronic combined systolic and diastolic HF (EF 35% Grade II DD), CAD S/P MI 2010, Angiogram 12/18/19 mid LAD 90% stenosis, mid OM2 75% stenosis, and Ostial D1 70% stenosis LVDEP 25 mmHg, PAD S/P PTA with stents BLE 2015, IDDM (A1c 10.3%), HTN, HLD, GERD, Anxiety/ Depression. Patient present with a 2 day history of nausea and with multiple episodes of non-bilious non-bloody emesis and couple of episodes of watery non-bloody diarrhea. Patient reports initially feeling HARTMAN without associated chest pain/tightness for the past week before progressively worsening to the GI symptoms described. She reports having a 7 lb weight gain (dry weight unknown) and only able to walk less than 20-30 ft before feeling short of breath. She reports that 5-10 minute rest resolves her dyspnea and she never has shortness of breath at rest. She reports feeling similar symptoms to this when she was admitted to the hospital on 12/6/19 for ADHF. On Monday 12/30 she doubled her home torsemide dosage to help reduce the excess water weight but reports that it did not help much. When he GI symptoms developed on Tuesday, she reports being unable to tolerate anything P.O. and subsequently stopped taking all of her home medications including her home insulin. What prompted her to come into the hospital was a fluttering feeling in her chest associated with some diaphoresis. She reports not remembering the symptoms she felt when she was having her MI in 2010 but  at bedside  "reported that she had nausea and vomiting leading up to her MI and she collapsed and lost consciousness and then later once admitted to the hospital was told she had had a heart attack. Currently the patient reports feeling nauseated, diaphoretic, a mild headache, epigastric pain, with a vague "heart burn" like feeling in her chest. She denies light headedness, chest tightness, dyspnea, palpitations, dysuria, constipation, edema. She denies any recent sick contacts and fevers at home.    In the ED, , EKG showed sinus tachycardia without ST changes, 1st troponin 0.176, BNP 1,013, CXR - mild interstitial edema with pulmonary vascular congestion. She was seen by cardiology who suggested an ACS work up due to her recent angiogram showing vessel blockages and her being a diabetic with similar vague symptoms from last MI. The also suggested for CTS to be consulted to assess for CABG. Patient was admitted to hospital medicine Team 2 for further management and work up.      Overview/Hospital Course:  55 y/o AAF with known medical medical issues of chronic combined systolic and diastolic HF (EF 35% Grade II DD), CAD S/P MI 2010, Angiogram 12/18/19 mid LAD 90% stenosis, mid OM2 75% stenosis, and Ostial D1 70% stenosis LVDEP 25 mmHg, PAD S/P PTA with stents BLE 2015, IDDM (A1c 10.3%), HTN, HLD, GERD, Anxiety/ Depression. Worked up for NSTEMI/ACS due to Chest discomfort nonspecific ST changes on EKG and elevated troponin. Patient had an MI in 2002 with similar prodrome/symptoms leading up to the acute coronary event. Patient tentatively planned for CABG depending on the CTA results and her repeat HbA1C for 1/7/20, plan for surgery involving hybrid off pump coronary artery bypass (LIMA-LAD) with postoperative percutaneous coronary intervention to the LCx. Will hold Plavix in anticipation of surgery and will start heparin gtt after 48 hour per Cards recommendations. Patient developed 10/10 right sided non radiating sharp " chest pain 1/4/20 after eating. Ordered Troponin - 0.188 -->0.169, CXR - Interval improvement of opacity in the right lower lung zone not pneumothorax or widened midiastineum, EKG - No ST changes to rule out MI and gave subliqual nitro X3. Gave a GI cocktail to rule out indigestion. Patient felt relief after burping. Endocrinology following for optimal diabetes management in anticipation of surgery per CTS request. 1/5/2020: Patient denies chest pain. She developed ROSLYN on CKD which is most likely multifactorial. Urine electrolytes, U/s retroperitoneal unremarkable. Nephrology on board with Cr trending up  1.2>3.4>4.2> 2.6> 1.2> 1.0     Interval History: NAEO. Patient renal function continues to improve. Will remain inpatient until surgery on 1/14. Labs every other day    Review of Systems    Objective:     Vital Signs (Most Recent):  Temp: 98.1 °F (36.7 °C) (01/10/20 1058)  Pulse: 79 (01/10/20 1100)  Resp: 18 (01/10/20 1058)  BP: 137/67 (01/10/20 1124)  SpO2: 99 % (01/10/20 1058) Vital Signs (24h Range):  Temp:  [97.3 °F (36.3 °C)-98.7 °F (37.1 °C)] 98.1 °F (36.7 °C)  Pulse:  [74-87] 79  Resp:  [16-20] 18  SpO2:  [95 %-99 %] 99 %  BP: (117-156)/(59-74) 137/67     Weight: 92.1 kg (203 lb)  Body mass index is 34.84 kg/m².  No intake or output data in the 24 hours ending 01/10/20 1520   Physical Exam   Constitutional: She is oriented to person, place, and time. She appears well-developed and well-nourished. She does not have a sickly appearance. She does not appear ill. No distress.   HENT:   Head: Normocephalic and atraumatic.   Eyes: Conjunctivae and EOM are normal.   Neck: Normal range of motion. Neck supple. No JVD present.   Cardiovascular: Regular rhythm and intact distal pulses.   Pulmonary/Chest: Effort normal. No respiratory distress. She has no rales. She exhibits no tenderness.   Abdominal: Soft. Bowel sounds are normal. She exhibits no distension. There is no tenderness. There is no rebound and no guarding.  "  Musculoskeletal: Normal range of motion. She exhibits no edema or tenderness.   Neurological: She is alert and oriented to person, place, and time. No sensory deficit.   Skin: Skin is warm and dry. She is not diaphoretic.   Psychiatric: She has a normal mood and affect. Her behavior is normal. Judgment and thought content normal.       Significant Labs:   CBC:   Recent Labs   Lab 01/09/20  0341   WBC 7.10   HGB 10.8*   HCT 35.0*        CMP:   Recent Labs   Lab 01/09/20  0341      K 4.4      CO2 26   GLU 93   BUN 20   CREATININE 1.0   CALCIUM 9.4   PROT 6.9   ALBUMIN 2.6*   BILITOT 0.3   ALKPHOS 82   AST 12   ALT 12   ANIONGAP 6*   EGFRNONAA >60.0           Assessment/Plan:      * Acute coronary syndrome  53 y/o AAF with known medical medical issues of chronic combined systolic and diastolic HF (EF 35% Grade II DD), CAD S/P MI 2010, Angiogram 12/18/19 mid LAD 90% stenosis, mid OM2 75% stenosis, and Ostial D1 70% stenosis LVDEP 25 mmHg, PAD S/P PTA with stents BLE 2015, IDDM (A1c 10.3%), HTN, HLD presenting with HARTMAN and nausea/ vomiting and "heart burn" like chest discomfort similar in characteristic for her last MI in 2010.     EKG - Sinus tachycardia without ST changes  1st Troponin - 0.176  BNP - 1,013    Seen by Cardiology in the ED who suggested ACS work up and CTS consult due to multi-vessel coronary disease  TTE: minimal changes since last echo. CVP 8 mmHg       PLAN:  -- Heparin gtt ACS protocol - for 48 hours stop date 1/4/20  --  mg in ED with 81 mg daily and Plavix load 300 mg X1 Holding Plavix in anticipation of CABG tentatively 1/7/20   -- Lasix 80mg IV BID - d/c   -- EKG PRN  -- continuous cardiac monitoring  -- CTS Consulted - tentatively planned for CABG on 1/14,  involving hybrid off pump coronary artery bypass (LIMA-LAD) with postoperative percutaneous coronary intervention to the LCx -   -- Famotidine IV 20 mg BID - for prevention of GI Bleeds in ACS work up  -- High " intensity atorvastatin 80 mg QHS  - c/w ASA, full potency statin.  - ACEI  held due to ROSLYN and borderline low BP  - Plavix stopped per CTS recommendations     Weakness  PT/OT      Contrast dye induced nephropathy  *improving  PLAN:  -- current Cr 1. Increased to 3.4> 4.2> 2.6> 1.2> 1.0  -- gave 500 cc bolus NS 1/4 - Judicious due to EF 35%  -- Avoid nephrotoxic medications if able  -- Hold furosemide, lisinopril  - UAR and retroperitoneal US unremarkable. Spun urine showing granular casts  - Nephrology on board      CAD (coronary artery disease)  CAD S/P MI 2010, Angiogram 12/18/19 mid LAD 90% stenosis, mid OM2 75% stenosis, and Ostial D1 70% stenosis LVDEP 25 mmHg    PLAN:  - CABG tentatively planned for Tuesday 1/14/2020  - c/w ASA, full potency statin   - ACEI  held due to ROSLYN and borderline low BP  - Plavix stopped per CTS recommendations     Type 2 diabetes mellitus with hyperglycemia, with long-term current use of insulin  A1c 12/2019 - 10.3% 1/3/2020 - 9.7%    BG on admission was 347. Patient reports not taking her insulin for 2 days due to low P.O. Intake  Home regimen: Aspart 9 U WM + Lantus 50 U QHS    PLAN:  -- Per Endocrine recommendation; appreciate recs:  Levemir 16 units daily and novolog 4 units with meals with meals   -- POC BG 4 X daily   -- LDSSI PRN  -- Goal -180      Acute on chronic combined systolic (congestive) and diastolic (congestive) heart failure  TTE 12/6/19: Moderately decreased left ventricular systolic function. The estimated ejection fraction is 35%. Grade II (moderate) left ventricular diastolic dysfunction consistent with pseudonormalization. Moderate global hypokinetic wall motion. Normal right ventricular systolic function. Severe left atrial enlargement. Mild-to-moderate mitral regurgitation. Mild to moderate tricuspid regurgitation. The estimated PA systolic pressure is 49 mm Hg.Pulmonary hypertension present. Normal central venous pressure (3 mm Hg).    CXR 1/2/20: Mild  interstitial edema with pulmonary vasculature congestion    TTE 1/3/20: Mild left ventricular enlargement. Moderately decreased left ventricular systolic function. The estimated ejection fraction is 35%. Grade II (moderate) left ventricular diastolic dysfunction consistent with pseudonormalization. Septal wall has abnormal motion. Local segmental wall motion abnormalities. Moderate left atrial enlargement. Normal right ventricular systolic function. Mild mitral regurgitation. Mild tricuspid regurgitation. Mild pulmonic regurgitation. Intermediate central venous pressure (8 mm Hg). The estimated PA systolic pressure is 46 mm Hg. Very small aneurysm in inferoapex visualized on image 47. The quantitatively dervived ejection fraction is 34%. Mild aortic regurgitation. Pulmonary hypertension present.    PLAN:  -- Carvedilol restarted at half dose (6.25mg BID)  -- Hold ACEi in setting of soft BP and contrast induced ROSLYN  -- IV lasix 80 mg QD - Held in the setting of ROSLYN  -- Strict I/Os  -- Daily standing weights  -- Low sodium (<2g) diet with 1500 cc fluid restriction        Mixed hyperlipidemia  Home med:  Atorvastatin 80 mg    Lipid panel 12/16/19: T cholesterol 244, HDL 46, , Triglyceride 135  Lipid panel 1/3/2020: T cholesterol 246 HDL 41 ; Triglycerides 115    PLAN:  -- Continue home statin  -- goal LDL <70     Non-intractable vomiting with nausea  On admission patient having two days of non-bloody non-bilious emesis with poor P.O. Intake    PLAN: - Improved  -- P.O. Phenergan 25 mg PRN  -- EKG daily to assess for QTc. 1/3/20 EKG QTc - 479  1/4/20 EKG Sinus rhythm with marked sinus arrythmia  Minimal voltage criteria for LVH, QT prolongation at 464      Essential hypertension  Home: Lisinopril 40 mg QD, Coreg 12.5 mg BID    PLAN:  -- Hold Lisinopril while BP is soft and patient has contrast induced ROSLYN  -- Goal BP <140/90      Peripheral vascular disease  S/P PTA with stents BLE 2015    CTA Ab/pelvis  with run off:  1. Diffuse atherosclerotic disease resulting in stenosis of the origins of the celiac artery, SMA, bilateral accessory renal arteries.  2. There is diffuse atherosclerotic disease in the bilateral lower extremities with bilateral SFA-popliteal artery stent occlusion.  The popliteal arteries reconstitute distally with collateral vessels.  3. Leiomyomatous uterus.      ROSLYN (acute kidney injury)          VTE Risk Mitigation (From admission, onward)         Ordered     enoxaparin injection 40 mg  Daily      01/10/20 0800     Place sequential compression device  Until discontinued      01/02/20 1518     IP VTE HIGH RISK PATIENT  Once      01/02/20 1518                      Mary Lou Wolf MD  Department of Hospital Medicine   [facility]

## 2020-01-10 NOTE — ASSESSMENT & PLAN NOTE
CAD S/P MI 2010, Angiogram 12/18/19 mid LAD 90% stenosis, mid OM2 75% stenosis, and Ostial D1 70% stenosis LVDEP 25 mmHg    PLAN:  - CABG tentatively planned for Tuesday 1/14/2020  - c/w ASA, full potency statin   - ACEI  held due to ROSLYN and borderline low BP  - Plavix stopped per CTS recommendations

## 2020-01-10 NOTE — ASSESSMENT & PLAN NOTE
"53 y/o AAF with known medical medical issues of chronic combined systolic and diastolic HF (EF 35% Grade II DD), CAD S/P MI 2010, Angiogram 12/18/19 mid LAD 90% stenosis, mid OM2 75% stenosis, and Ostial D1 70% stenosis LVDEP 25 mmHg, PAD S/P PTA with stents BLE 2015, IDDM (A1c 10.3%), HTN, HLD presenting with HARTMAN and nausea/ vomiting and "heart burn" like chest discomfort similar in characteristic for her last MI in 2010.     EKG - Sinus tachycardia without ST changes  1st Troponin - 0.176  BNP - 1,013    Seen by Cardiology in the ED who suggested ACS work up and CTS consult due to multi-vessel coronary disease  TTE: minimal changes since last echo. CVP 8 mmHg       PLAN:  -- Heparin gtt ACS protocol - for 48 hours stop date 1/4/20  --  mg in ED with 81 mg daily and Plavix load 300 mg X1 Holding Plavix in anticipation of CABG tentatively 1/7/20   -- Lasix 80mg IV BID - d/c   -- EKG PRN  -- continuous cardiac monitoring  -- CTS Consulted - tentatively planned for CABG on 1/14,  involving hybrid off pump coronary artery bypass (LIMA-LAD) with postoperative percutaneous coronary intervention to the LCx -   -- Famotidine IV 20 mg BID - for prevention of GI Bleeds in ACS work up  -- High intensity atorvastatin 80 mg QHS  - c/w ASA, full potency statin.  - ACEI  held due to ROSLYN and borderline low BP  - Plavix stopped per CTS recommendations   "

## 2020-01-10 NOTE — ASSESSMENT & PLAN NOTE
A1c 12/2019 - 10.3% 1/3/2020 - 9.7%    BG on admission was 347. Patient reports not taking her insulin for 2 days due to low P.O. Intake  Home regimen: Aspart 9 U WM + Lantus 50 U QHS    PLAN:  -- Per Endocrine recommendation; appreciate recs:  Levemir 16 units daily and novolog 4 units with meals with meals   -- POC BG 4 X daily   -- LDSSI PRN  -- Goal -180

## 2020-01-11 LAB
ALBUMIN SERPL BCP-MCNC: 2.5 G/DL (ref 3.5–5.2)
ALP SERPL-CCNC: 95 U/L (ref 55–135)
ALT SERPL W/O P-5'-P-CCNC: 14 U/L (ref 10–44)
ANION GAP SERPL CALC-SCNC: 7 MMOL/L (ref 8–16)
AST SERPL-CCNC: 14 U/L (ref 10–40)
BASOPHILS # BLD AUTO: 0.05 K/UL (ref 0–0.2)
BASOPHILS NFR BLD: 0.8 % (ref 0–1.9)
BILIRUB SERPL-MCNC: 0.2 MG/DL (ref 0.1–1)
BUN SERPL-MCNC: 18 MG/DL (ref 6–20)
CALCIUM SERPL-MCNC: 9.2 MG/DL (ref 8.7–10.5)
CHLORIDE SERPL-SCNC: 107 MMOL/L (ref 95–110)
CO2 SERPL-SCNC: 23 MMOL/L (ref 23–29)
CREAT SERPL-MCNC: 0.9 MG/DL (ref 0.5–1.4)
DIFFERENTIAL METHOD: ABNORMAL
EOSINOPHIL # BLD AUTO: 0.2 K/UL (ref 0–0.5)
EOSINOPHIL NFR BLD: 2.6 % (ref 0–8)
ERYTHROCYTE [DISTWIDTH] IN BLOOD BY AUTOMATED COUNT: 13 % (ref 11.5–14.5)
EST. GFR  (AFRICAN AMERICAN): >60 ML/MIN/1.73 M^2
EST. GFR  (NON AFRICAN AMERICAN): >60 ML/MIN/1.73 M^2
GLUCOSE SERPL-MCNC: 177 MG/DL (ref 70–110)
HCT VFR BLD AUTO: 34.1 % (ref 37–48.5)
HGB BLD-MCNC: 10.4 G/DL (ref 12–16)
IMM GRANULOCYTES # BLD AUTO: 0.02 K/UL (ref 0–0.04)
IMM GRANULOCYTES NFR BLD AUTO: 0.3 % (ref 0–0.5)
LYMPHOCYTES # BLD AUTO: 2.1 K/UL (ref 1–4.8)
LYMPHOCYTES NFR BLD: 33 % (ref 18–48)
MAGNESIUM SERPL-MCNC: 1.9 MG/DL (ref 1.6–2.6)
MCH RBC QN AUTO: 30.6 PG (ref 27–31)
MCHC RBC AUTO-ENTMCNC: 30.5 G/DL (ref 32–36)
MCV RBC AUTO: 100 FL (ref 82–98)
MONOCYTES # BLD AUTO: 0.7 K/UL (ref 0.3–1)
MONOCYTES NFR BLD: 11.7 % (ref 4–15)
NEUTROPHILS # BLD AUTO: 3.2 K/UL (ref 1.8–7.7)
NEUTROPHILS NFR BLD: 51.6 % (ref 38–73)
NRBC BLD-RTO: 0 /100 WBC
PHOSPHATE SERPL-MCNC: 3.5 MG/DL (ref 2.7–4.5)
PLATELET # BLD AUTO: 274 K/UL (ref 150–350)
PMV BLD AUTO: 10.7 FL (ref 9.2–12.9)
POCT GLUCOSE: 142 MG/DL (ref 70–110)
POCT GLUCOSE: 150 MG/DL (ref 70–110)
POCT GLUCOSE: 161 MG/DL (ref 70–110)
POCT GLUCOSE: 170 MG/DL (ref 70–110)
POTASSIUM SERPL-SCNC: 4.6 MMOL/L (ref 3.5–5.1)
PROT SERPL-MCNC: 6.7 G/DL (ref 6–8.4)
RBC # BLD AUTO: 3.4 M/UL (ref 4–5.4)
SODIUM SERPL-SCNC: 137 MMOL/L (ref 136–145)
WBC # BLD AUTO: 6.24 K/UL (ref 3.9–12.7)

## 2020-01-11 PROCEDURE — 11000001 HC ACUTE MED/SURG PRIVATE ROOM

## 2020-01-11 PROCEDURE — 25000003 PHARM REV CODE 250: Performed by: INTERNAL MEDICINE

## 2020-01-11 PROCEDURE — 85025 COMPLETE CBC W/AUTO DIFF WBC: CPT

## 2020-01-11 PROCEDURE — 99232 SBSQ HOSP IP/OBS MODERATE 35: CPT | Mod: ,,, | Performed by: INTERNAL MEDICINE

## 2020-01-11 PROCEDURE — 99232 PR SUBSEQUENT HOSPITAL CARE,LEVL II: ICD-10-PCS | Mod: ,,, | Performed by: INTERNAL MEDICINE

## 2020-01-11 PROCEDURE — 83735 ASSAY OF MAGNESIUM: CPT

## 2020-01-11 PROCEDURE — 36415 COLL VENOUS BLD VENIPUNCTURE: CPT

## 2020-01-11 PROCEDURE — 63600175 PHARM REV CODE 636 W HCPCS: Performed by: STUDENT IN AN ORGANIZED HEALTH CARE EDUCATION/TRAINING PROGRAM

## 2020-01-11 PROCEDURE — 84100 ASSAY OF PHOSPHORUS: CPT

## 2020-01-11 PROCEDURE — 80053 COMPREHEN METABOLIC PANEL: CPT

## 2020-01-11 PROCEDURE — 25000003 PHARM REV CODE 250: Performed by: STUDENT IN AN ORGANIZED HEALTH CARE EDUCATION/TRAINING PROGRAM

## 2020-01-11 PROCEDURE — 63600175 PHARM REV CODE 636 W HCPCS: Performed by: NURSE PRACTITIONER

## 2020-01-11 RX ORDER — FAMOTIDINE 20 MG/1
20 TABLET, FILM COATED ORAL 2 TIMES DAILY
Status: DISCONTINUED | OUTPATIENT
Start: 2020-01-11 | End: 2020-01-14

## 2020-01-11 RX ADMIN — FAMOTIDINE 20 MG: 20 TABLET ORAL at 09:01

## 2020-01-11 RX ADMIN — ESCITALOPRAM OXALATE 10 MG: 10 TABLET ORAL at 09:01

## 2020-01-11 RX ADMIN — ATORVASTATIN CALCIUM 80 MG: 20 TABLET, FILM COATED ORAL at 09:01

## 2020-01-11 RX ADMIN — INSULIN ASPART 4 UNITS: 100 INJECTION, SOLUTION INTRAVENOUS; SUBCUTANEOUS at 01:01

## 2020-01-11 RX ADMIN — ENOXAPARIN SODIUM 40 MG: 100 INJECTION SUBCUTANEOUS at 05:01

## 2020-01-11 RX ADMIN — ASPIRIN 81 MG CHEWABLE TABLET 81 MG: 81 TABLET CHEWABLE at 09:01

## 2020-01-11 RX ADMIN — CARVEDILOL 6.25 MG: 6.25 TABLET, FILM COATED ORAL at 09:01

## 2020-01-11 RX ADMIN — GABAPENTIN 300 MG: 300 CAPSULE ORAL at 09:01

## 2020-01-11 RX ADMIN — GABAPENTIN 300 MG: 300 CAPSULE ORAL at 03:01

## 2020-01-11 RX ADMIN — INSULIN ASPART 4 UNITS: 100 INJECTION, SOLUTION INTRAVENOUS; SUBCUTANEOUS at 08:01

## 2020-01-11 RX ADMIN — GABAPENTIN 300 MG: 300 CAPSULE ORAL at 08:01

## 2020-01-11 RX ADMIN — CARVEDILOL 6.25 MG: 6.25 TABLET, FILM COATED ORAL at 05:01

## 2020-01-11 RX ADMIN — INSULIN ASPART 4 UNITS: 100 INJECTION, SOLUTION INTRAVENOUS; SUBCUTANEOUS at 05:01

## 2020-01-11 RX ADMIN — INSULIN DETEMIR 16 UNITS: 100 INJECTION, SOLUTION SUBCUTANEOUS at 09:01

## 2020-01-11 NOTE — SUBJECTIVE & OBJECTIVE
Interval History: NAEO. Patient stable. No chest pain, no complaints. Cr stable at 1.0.     Review of Systems  Objective:     Vital Signs (Most Recent):  Temp: 98.4 °F (36.9 °C) (01/11/20 1118)  Pulse: 79 (01/11/20 1118)  Resp: 18 (01/11/20 1118)  BP: (!) 111/53 (01/11/20 1118)  SpO2: 96 % (01/11/20 1118) Vital Signs (24h Range):  Temp:  [97.6 °F (36.4 °C)-98.4 °F (36.9 °C)] 98.4 °F (36.9 °C)  Pulse:  [77-85] 79  Resp:  [18-20] 18  SpO2:  [95 %-98 %] 96 %  BP: (110-130)/(53-78) 111/53     Weight: 98.9 kg (218 lb 0.6 oz)  Body mass index is 37.43 kg/m².  No intake or output data in the 24 hours ending 01/11/20 1229   Physical Exam   Constitutional: She is oriented to person, place, and time. She appears well-developed and well-nourished. She does not have a sickly appearance. She does not appear ill. No distress.   HENT:   Head: Normocephalic and atraumatic.   Eyes: Conjunctivae and EOM are normal.   Neck: Normal range of motion. Neck supple. No JVD present.   Cardiovascular: Regular rhythm and intact distal pulses.   Pulmonary/Chest: Effort normal. No respiratory distress. She has no rales. She exhibits no tenderness.   Abdominal: Soft. Bowel sounds are normal. She exhibits no distension. There is no tenderness. There is no rebound and no guarding.   Musculoskeletal: Normal range of motion. She exhibits no edema or tenderness.   Neurological: She is alert and oriented to person, place, and time. No sensory deficit.   Skin: Skin is warm and dry. She is not diaphoretic.   Psychiatric: She has a normal mood and affect. Her behavior is normal. Judgment and thought content normal.   Nursing note and vitals reviewed.      Significant Labs:   CBC:   Recent Labs   Lab 01/11/20  0522   WBC 6.24   HGB 10.4*   HCT 34.1*        CMP:   Recent Labs   Lab 01/11/20  0522      K 4.6      CO2 23   *   BUN 18   CREATININE 0.9   CALCIUM 9.2   PROT 6.7   ALBUMIN 2.5*   BILITOT 0.2   ALKPHOS 95   AST 14   ALT 14    ANIONGAP 7*   EGFRNONAA >60.0

## 2020-01-11 NOTE — PROGRESS NOTES
Pharmacist Renal Dose Adjustment Note    Suyapa Connelly is a 54 y.o. female being treated with the medication famotidine    Patient Data:    Vital Signs (Most Recent):  Temp: 97.6 °F (36.4 °C) (01/11/20 0738)  Pulse: 84 (01/11/20 0738)  Resp: 18 (01/11/20 0738)  BP: 124/78 (01/11/20 0738)  SpO2: 98 % (01/11/20 0738) Vital Signs (72h Range):  Temp:  [96.6 °F (35.9 °C)-98.7 °F (37.1 °C)]   Pulse:  [74-93]   Resp:  [14-20]   BP: (107-156)/(53-89)   SpO2:  [93 %-100 %]      Recent Labs   Lab 01/08/20  0553 01/09/20  0341 01/11/20 0522   CREATININE 1.2 1.0 0.9     Serum creatinine: 0.9 mg/dL 01/11/20 0522  Estimated creatinine clearance: 82.6 mL/min    Medication: Famotidine 20 mg PO daily will be changed to famotidine 20 mg PO twice daily      Pharmacist's Name: Jovan Mora  Pharmacist's Extension: [phone]

## 2020-01-11 NOTE — PROGRESS NOTES
[facility] Hospital Medicine  Progress Note    Patient Name: Suyapa Connelly  MRN: [id number]  Patient Class: IP- Inpatient   Admission Date: 1/2/2020  Length of Stay: 9 days  Attending Physician: Lulú Macias MD  Primary Care Provider: Erlinda Rahman NP    [facility] Medicine Team: [facility] HOSP MED 2 Viral Lo MD    Subjective:     Principal Problem:Acute coronary syndrome      HPI:  Ms. Connelly is a 53 y/o AAF with known medical medical issues of chronic combined systolic and diastolic HF (EF 35% Grade II DD), CAD S/P MI 2010, Angiogram 12/18/19 mid LAD 90% stenosis, mid OM2 75% stenosis, and Ostial D1 70% stenosis LVDEP 25 mmHg, PAD S/P PTA with stents BLE 2015, IDDM (A1c 10.3%), HTN, HLD, GERD, Anxiety/ Depression. Patient present with a 2 day history of nausea and with multiple episodes of non-bilious non-bloody emesis and couple of episodes of watery non-bloody diarrhea. Patient reports initially feeling HARTMAN without associated chest pain/tightness for the past week before progressively worsening to the GI symptoms described. She reports having a 7 lb weight gain (dry weight unknown) and only able to walk less than 20-30 ft before feeling short of breath. She reports that 5-10 minute rest resolves her dyspnea and she never has shortness of breath at rest. She reports feeling similar symptoms to this when she was admitted to the hospital on 12/6/19 for ADHF. On Monday 12/30 she doubled her home torsemide dosage to help reduce the excess water weight but reports that it did not help much. When he GI symptoms developed on Tuesday, she reports being unable to tolerate anything P.O. and subsequently stopped taking all of her home medications including her home insulin. What prompted her to come into the hospital was a fluttering feeling in her chest associated with some diaphoresis. She reports not remembering the symptoms she felt when she was having her MI in 2010 but  at  "bedside reported that she had nausea and vomiting leading up to her MI and she collapsed and lost consciousness and then later once admitted to the hospital was told she had had a heart attack. Currently the patient reports feeling nauseated, diaphoretic, a mild headache, epigastric pain, with a vague "heart burn" like feeling in her chest. She denies light headedness, chest tightness, dyspnea, palpitations, dysuria, constipation, edema. She denies any recent sick contacts and fevers at home.    In the ED, , EKG showed sinus tachycardia without ST changes, 1st troponin 0.176, BNP 1,013, CXR - mild interstitial edema with pulmonary vascular congestion. She was seen by cardiology who suggested an ACS work up due to her recent angiogram showing vessel blockages and her being a diabetic with similar vague symptoms from last MI. The also suggested for CTS to be consulted to assess for CABG. Patient was admitted to hospital medicine Team 2 for further management and work up.      Overview/Hospital Course:  55 y/o AAF with known medical medical issues of chronic combined systolic and diastolic HF (EF 35% Grade II DD), CAD S/P MI 2010, Angiogram 12/18/19 mid LAD 90% stenosis, mid OM2 75% stenosis, and Ostial D1 70% stenosis LVDEP 25 mmHg, PAD S/P PTA with stents BLE 2015, IDDM (A1c 10.3%), HTN, HLD, GERD, Anxiety/ Depression. Worked up for NSTEMI/ACS due to Chest discomfort nonspecific ST changes on EKG and elevated troponin. Patient had an MI in 2002 with similar prodrome/symptoms leading up to the acute coronary event. Patient tentatively planned for CABG depending on the CTA results and her repeat HbA1C for 1/7/20, plan for surgery involving hybrid off pump coronary artery bypass (LIMA-LAD) with postoperative percutaneous coronary intervention to the LCx. Will hold Plavix in anticipation of surgery and will start heparin gtt after 48 hour per Cards recommendations. Patient developed 10/10 right sided non radiating " sharp chest pain 1/4/20 after eating. Ordered Troponin - 0.188 -->0.169, CXR - Interval improvement of opacity in the right lower lung zone not pneumothorax or widened midiastineum, EKG - No ST changes to rule out MI and gave subliqual nitro X3. Gave a GI cocktail to rule out indigestion. Patient felt relief after burping. Endocrinology following for optimal diabetes management in anticipation of surgery per CTS request. 1/5/2020: Patient denies chest pain. She developed ROSLYN on CKD which is most likely multifactorial. Urine electrolytes, U/s retroperitoneal unremarkable. Nephrology on board with Cr trending up  1.2>3.4>4.2> 2.6> 1.2> 1.0     Interval History: NAEO. Patient stable. No chest pain, no complaints. Cr stable at 1.0.     Review of Systems  Objective:     Vital Signs (Most Recent):  Temp: 98.4 °F (36.9 °C) (01/11/20 1118)  Pulse: 79 (01/11/20 1118)  Resp: 18 (01/11/20 1118)  BP: (!) 111/53 (01/11/20 1118)  SpO2: 96 % (01/11/20 1118) Vital Signs (24h Range):  Temp:  [97.6 °F (36.4 °C)-98.4 °F (36.9 °C)] 98.4 °F (36.9 °C)  Pulse:  [77-85] 79  Resp:  [18-20] 18  SpO2:  [95 %-98 %] 96 %  BP: (110-130)/(53-78) 111/53     Weight: 98.9 kg (218 lb 0.6 oz)  Body mass index is 37.43 kg/m².  No intake or output data in the 24 hours ending 01/11/20 1229   Physical Exam   Constitutional: She is oriented to person, place, and time. She appears well-developed and well-nourished. She does not have a sickly appearance. She does not appear ill. No distress.   HENT:   Head: Normocephalic and atraumatic.   Eyes: Conjunctivae and EOM are normal.   Neck: Normal range of motion. Neck supple. No JVD present.   Cardiovascular: Regular rhythm and intact distal pulses.   Pulmonary/Chest: Effort normal. No respiratory distress. She has no rales. She exhibits no tenderness.   Abdominal: Soft. Bowel sounds are normal. She exhibits no distension. There is no tenderness. There is no rebound and no guarding.   Musculoskeletal: Normal  "range of motion. She exhibits no edema or tenderness.   Neurological: She is alert and oriented to person, place, and time. No sensory deficit.   Skin: Skin is warm and dry. She is not diaphoretic.   Psychiatric: She has a normal mood and affect. Her behavior is normal. Judgment and thought content normal.   Nursing note and vitals reviewed.      Significant Labs:   CBC:   Recent Labs   Lab 01/11/20 0522   WBC 6.24   HGB 10.4*   HCT 34.1*        CMP:   Recent Labs   Lab 01/11/20 0522      K 4.6      CO2 23   *   BUN 18   CREATININE 0.9   CALCIUM 9.2   PROT 6.7   ALBUMIN 2.5*   BILITOT 0.2   ALKPHOS 95   AST 14   ALT 14   ANIONGAP 7*   EGFRNONAA >60.0       Assessment/Plan:      * Acute coronary syndrome  53 y/o AAF with known medical medical issues of chronic combined systolic and diastolic HF (EF 35% Grade II DD), CAD S/P MI 2010, Angiogram 12/18/19 mid LAD 90% stenosis, mid OM2 75% stenosis, and Ostial D1 70% stenosis LVDEP 25 mmHg, PAD S/P PTA with stents BLE 2015, IDDM (A1c 10.3%), HTN, HLD presenting with HARTMAN and nausea/ vomiting and "heart burn" like chest discomfort similar in characteristic for her last MI in 2010.     EKG - Sinus tachycardia without ST changes  1st Troponin - 0.176  BNP - 1,013    Seen by Cardiology in the ED who suggested ACS work up and CTS consult due to multi-vessel coronary disease  TTE: minimal changes since last echo. CVP 8 mmHg       PLAN:  -- Heparin gtt ACS protocol - for 48 hours stop date 1/4/20  --  mg in ED with 81 mg daily and Plavix load 300 mg X1 Holding Plavix in anticipation of CABG tentatively 1/7/20   -- Lasix 80mg IV BID - d/c   -- EKG PRN  -- continuous cardiac monitoring  -- CTS Consulted - tentatively planned for CABG on 1/14,  involving hybrid off pump coronary artery bypass (LIMA-LAD) with postoperative percutaneous coronary intervention to the LCx -   -- Famotidine IV 20 mg BID - for prevention of GI Bleeds in ACS work up  -- " High intensity atorvastatin 80 mg QHS  - c/w ASA, full potency statin.  - ACEI  held due to ROSLYN and borderline low BP  - Plavix stopped per CTS recommendations     Weakness  PT/OT      Contrast dye induced nephropathy  *improving  PLAN:  -- current Cr 1. Increased to 3.4> 4.2> 2.6> 1.2> 1.0  -- gave 500 cc bolus NS 1/4 - Judicious due to EF 35%  -- Avoid nephrotoxic medications if able  -- Hold furosemide, lisinopril  - UAR and retroperitoneal US unremarkable. Spun urine showing granular casts  - Nephrology on board      CAD (coronary artery disease)  CAD S/P MI 2010, Angiogram 12/18/19 mid LAD 90% stenosis, mid OM2 75% stenosis, and Ostial D1 70% stenosis LVDEP 25 mmHg    PLAN:  - CABG tentatively planned for Tuesday 1/14/2020  - c/w ASA, full potency statin   - ACEI  held due to ROSLYN and borderline low BP  - Plavix stopped per CTS recommendations     Type 2 diabetes mellitus with hyperglycemia, with long-term current use of insulin  A1c 12/2019 - 10.3% 1/3/2020 - 9.7%    BG on admission was 347. Patient reports not taking her insulin for 2 days due to low P.O. Intake  Home regimen: Aspart 9 U WM + Lantus 50 U QHS    PLAN:  -- Per Endocrine recommendation; appreciate recs:  Levemir 16 units daily and novolog 4 units with meals with meals   -- POC BG 4 X daily   -- LDSSI PRN  -- Goal -180      Acute on chronic combined systolic (congestive) and diastolic (congestive) heart failure  TTE 12/6/19: Moderately decreased left ventricular systolic function. The estimated ejection fraction is 35%. Grade II (moderate) left ventricular diastolic dysfunction consistent with pseudonormalization. Moderate global hypokinetic wall motion. Normal right ventricular systolic function. Severe left atrial enlargement. Mild-to-moderate mitral regurgitation. Mild to moderate tricuspid regurgitation. The estimated PA systolic pressure is 49 mm Hg.Pulmonary hypertension present. Normal central venous pressure (3 mm Hg).    CXR 1/2/20:  Mild interstitial edema with pulmonary vasculature congestion    TTE 1/3/20: Mild left ventricular enlargement. Moderately decreased left ventricular systolic function. The estimated ejection fraction is 35%. Grade II (moderate) left ventricular diastolic dysfunction consistent with pseudonormalization. Septal wall has abnormal motion. Local segmental wall motion abnormalities. Moderate left atrial enlargement. Normal right ventricular systolic function. Mild mitral regurgitation. Mild tricuspid regurgitation. Mild pulmonic regurgitation. Intermediate central venous pressure (8 mm Hg). The estimated PA systolic pressure is 46 mm Hg. Very small aneurysm in inferoapex visualized on image 47. The quantitatively dervived ejection fraction is 34%. Mild aortic regurgitation. Pulmonary hypertension present.    PLAN:  -- Carvedilol restarted at half dose (6.25mg BID)  -- Hold ACEi in setting of soft BP and contrast induced ROSLYN  -- IV lasix 80 mg QD - Held in the setting of ROSLYN  -- Strict I/Os  -- Daily standing weights  -- Low sodium (<2g) diet with 1500 cc fluid restriction        Mixed hyperlipidemia  Home med:  Atorvastatin 80 mg    Lipid panel 12/16/19: T cholesterol 244, HDL 46, , Triglyceride 135  Lipid panel 1/3/2020: T cholesterol 246 HDL 41 ; Triglycerides 115    PLAN:  -- Continue home statin  -- goal LDL <70     Non-intractable vomiting with nausea  On admission patient having two days of non-bloody non-bilious emesis with poor P.O. Intake    PLAN: - Improved  -- P.O. Phenergan 25 mg PRN  -- EKG daily to assess for QTc. 1/3/20 EKG QTc - 479  1/4/20 EKG Sinus rhythm with marked sinus arrythmia  Minimal voltage criteria for LVH, QT prolongation at 464      Essential hypertension  Home: Lisinopril 40 mg QD, Coreg 12.5 mg BID    PLAN:  -- Hold Lisinopril while BP is soft and patient has contrast induced ROSLYN  -- Goal BP <140/90      Peripheral vascular disease  S/P PTA with stents BLE 2015    CTA  Ab/pelvis with run off:  1. Diffuse atherosclerotic disease resulting in stenosis of the origins of the celiac artery, SMA, bilateral accessory renal arteries.  2. There is diffuse atherosclerotic disease in the bilateral lower extremities with bilateral SFA-popliteal artery stent occlusion.  The popliteal arteries reconstitute distally with collateral vessels.  3. Leiomyomatous uterus.      ROSLYN (acute kidney injury)          VTE Risk Mitigation (From admission, onward)         Ordered     enoxaparin injection 40 mg  Daily      01/10/20 0800     Place sequential compression device  Until discontinued      01/02/20 1518     IP VTE HIGH RISK PATIENT  Once      01/02/20 1518                Viral Lo MD  Department of Hospital Medicine   [facility]

## 2020-01-11 NOTE — CARE UPDATE
BG goal 140 - 180    BG is reasonably controlled on current SQ insulin regimen.       Continue Levemir to 16 units Nightly. 20% decrease  Continue Novolog to 4 units TIDWM.  Low Dose SQ Insulin Correction Scale.  BG Monitoring AC/HS      ** Please call Endocrine for any BG related issues **  ** Please notify Endocrine for any change and/or advance in diet**     Discharge Planning:     TBD. Please notify endocrinology prior to discharge. Patient will need adjustments to home insulin regimen.

## 2020-01-12 LAB
POCT GLUCOSE: 149 MG/DL (ref 70–110)
POCT GLUCOSE: 159 MG/DL (ref 70–110)
POCT GLUCOSE: 166 MG/DL (ref 70–110)
POCT GLUCOSE: 231 MG/DL (ref 70–110)

## 2020-01-12 PROCEDURE — 63600175 PHARM REV CODE 636 W HCPCS: Performed by: STUDENT IN AN ORGANIZED HEALTH CARE EDUCATION/TRAINING PROGRAM

## 2020-01-12 PROCEDURE — 25000003 PHARM REV CODE 250: Performed by: INTERNAL MEDICINE

## 2020-01-12 PROCEDURE — 99232 PR SUBSEQUENT HOSPITAL CARE,LEVL II: ICD-10-PCS | Mod: ,,, | Performed by: INTERNAL MEDICINE

## 2020-01-12 PROCEDURE — 25000003 PHARM REV CODE 250: Performed by: STUDENT IN AN ORGANIZED HEALTH CARE EDUCATION/TRAINING PROGRAM

## 2020-01-12 PROCEDURE — 99232 SBSQ HOSP IP/OBS MODERATE 35: CPT | Mod: ,,, | Performed by: INTERNAL MEDICINE

## 2020-01-12 PROCEDURE — 11000001 HC ACUTE MED/SURG PRIVATE ROOM

## 2020-01-12 RX ADMIN — GABAPENTIN 300 MG: 300 CAPSULE ORAL at 08:01

## 2020-01-12 RX ADMIN — GABAPENTIN 300 MG: 300 CAPSULE ORAL at 09:01

## 2020-01-12 RX ADMIN — INSULIN ASPART 4 UNITS: 100 INJECTION, SOLUTION INTRAVENOUS; SUBCUTANEOUS at 01:01

## 2020-01-12 RX ADMIN — INSULIN ASPART 4 UNITS: 100 INJECTION, SOLUTION INTRAVENOUS; SUBCUTANEOUS at 04:01

## 2020-01-12 RX ADMIN — ESCITALOPRAM OXALATE 10 MG: 10 TABLET ORAL at 08:01

## 2020-01-12 RX ADMIN — INSULIN ASPART 4 UNITS: 100 INJECTION, SOLUTION INTRAVENOUS; SUBCUTANEOUS at 08:01

## 2020-01-12 RX ADMIN — ATORVASTATIN CALCIUM 80 MG: 20 TABLET, FILM COATED ORAL at 08:01

## 2020-01-12 RX ADMIN — GABAPENTIN 300 MG: 300 CAPSULE ORAL at 04:01

## 2020-01-12 RX ADMIN — CARVEDILOL 6.25 MG: 6.25 TABLET, FILM COATED ORAL at 06:01

## 2020-01-12 RX ADMIN — INSULIN DETEMIR 16 UNITS: 100 INJECTION, SOLUTION SUBCUTANEOUS at 09:01

## 2020-01-12 RX ADMIN — FAMOTIDINE 20 MG: 20 TABLET ORAL at 08:01

## 2020-01-12 RX ADMIN — ASPIRIN 81 MG CHEWABLE TABLET 81 MG: 81 TABLET CHEWABLE at 08:01

## 2020-01-12 RX ADMIN — ENOXAPARIN SODIUM 40 MG: 100 INJECTION SUBCUTANEOUS at 06:01

## 2020-01-12 RX ADMIN — CARVEDILOL 6.25 MG: 6.25 TABLET, FILM COATED ORAL at 08:01

## 2020-01-12 RX ADMIN — FAMOTIDINE 20 MG: 20 TABLET ORAL at 09:01

## 2020-01-12 NOTE — PROGRESS NOTES
[facility] Hospital Medicine  Progress Note    Patient Name: Suyapa Connelly  MRN: [id number]  Patient Class: IP- Inpatient   Admission Date: 1/2/2020  Length of Stay: 10 days  Attending Physician: Lulú Macias MD  Primary Care Provider: Erlinda Rahman NP    [facility] Medicine Team: [facility] MED 2 Viral Lo MD    Subjective:     Principal Problem:Acute coronary syndrome      HPI:  Ms. Connelly is a 55 y/o AAF with known medical medical issues of chronic combined systolic and diastolic HF (EF 35% Grade II DD), CAD S/P MI 2010, Angiogram 12/18/19 mid LAD 90% stenosis, mid OM2 75% stenosis, and Ostial D1 70% stenosis LVDEP 25 mmHg, PAD S/P PTA with stents BLE 2015, IDDM (A1c 10.3%), HTN, HLD, GERD, Anxiety/ Depression. Patient present with a 2 day history of nausea and with multiple episodes of non-bilious non-bloody emesis and couple of episodes of watery non-bloody diarrhea. Patient reports initially feeling HARTMAN without associated chest pain/tightness for the past week before progressively worsening to the GI symptoms described. She reports having a 7 lb weight gain (dry weight unknown) and only able to walk less than 20-30 ft before feeling short of breath. She reports that 5-10 minute rest resolves her dyspnea and she never has shortness of breath at rest. She reports feeling similar symptoms to this when she was admitted to the hospital on 12/6/19 for ADHF. On Monday 12/30 she doubled her home torsemide dosage to help reduce the excess water weight but reports that it did not help much. When he GI symptoms developed on Tuesday, she reports being unable to tolerate anything P.O. and subsequently stopped taking all of her home medications including her home insulin. What prompted her to come into the hospital was a fluttering feeling in her chest associated with some diaphoresis. She reports not remembering the symptoms she felt when she was having her MI in 2010 but  at  "bedside reported that she had nausea and vomiting leading up to her MI and she collapsed and lost consciousness and then later once admitted to the hospital was told she had had a heart attack. Currently the patient reports feeling nauseated, diaphoretic, a mild headache, epigastric pain, with a vague "heart burn" like feeling in her chest. She denies light headedness, chest tightness, dyspnea, palpitations, dysuria, constipation, edema. She denies any recent sick contacts and fevers at home.    In the ED, , EKG showed sinus tachycardia without ST changes, 1st troponin 0.176, BNP 1,013, CXR - mild interstitial edema with pulmonary vascular congestion. She was seen by cardiology who suggested an ACS work up due to her recent angiogram showing vessel blockages and her being a diabetic with similar vague symptoms from last MI. The also suggested for CTS to be consulted to assess for CABG. Patient was admitted to hospital medicine Team 2 for further management and work up.      Overview/Hospital Course:  55 y/o AAF with known medical medical issues of chronic combined systolic and diastolic HF (EF 35% Grade II DD), CAD S/P MI 2010, Angiogram 12/18/19 mid LAD 90% stenosis, mid OM2 75% stenosis, and Ostial D1 70% stenosis LVDEP 25 mmHg, PAD S/P PTA with stents BLE 2015, IDDM (A1c 10.3%), HTN, HLD, GERD, Anxiety/ Depression. Worked up for NSTEMI/ACS due to Chest discomfort nonspecific ST changes on EKG and elevated troponin. Patient had an MI in 2002 with similar prodrome/symptoms leading up to the acute coronary event. Patient tentatively planned for CABG depending on the CTA results and her repeat HbA1C for 1/7/20, plan for surgery involving hybrid off pump coronary artery bypass (LIMA-LAD) with postoperative percutaneous coronary intervention to the LCx. Will hold Plavix in anticipation of surgery and will start heparin gtt after 48 hour per Cards recommendations. Patient developed 10/10 right sided non radiating " sharp chest pain 1/4/20 after eating. Ordered Troponin - 0.188 -->0.169, CXR - Interval improvement of opacity in the right lower lung zone not pneumothorax or widened midiastineum, EKG - No ST changes to rule out MI and gave subliqual nitro X3. Gave a GI cocktail to rule out indigestion. Patient felt relief after burping. Endocrinology following for optimal diabetes management in anticipation of surgery per CTS request. 1/5/2020: Patient denies chest pain. She developed ROSLYN on CKD which is most likely multifactorial. Urine electrolytes, U/s retroperitoneal unremarkable. Nephrology on board with Cr trending up  1.2>3.4>4.2> 2.6> 1.2> 1.0     Interval History:  NAEO. No labs today. Patient denies chest pain. Sugars well-controlled.      Review of Systems  Objective:     Vital Signs (Most Recent):  Temp: 98.3 °F (36.8 °C) (01/12/20 0823)  Pulse: 85 (01/12/20 0823)  Resp: 18 (01/12/20 0823)  BP: 138/77 (01/12/20 0823)  SpO2: 99 % (01/12/20 0823) Vital Signs (24h Range):  Temp:  [97.7 °F (36.5 °C)-98.5 °F (36.9 °C)] 98.3 °F (36.8 °C)  Pulse:  [73-87] 85  Resp:  [18-20] 18  SpO2:  [92 %-99 %] 99 %  BP: (111-162)/(53-90) 138/77     Weight: 98.6 kg (217 lb 6 oz)  Body mass index is 37.31 kg/m².  No intake or output data in the 24 hours ending 01/12/20 1019   Physical Exam   Constitutional: She is oriented to person, place, and time. She appears well-developed and well-nourished. She does not have a sickly appearance. She does not appear ill. No distress.   HENT:   Head: Normocephalic and atraumatic.   Eyes: Conjunctivae and EOM are normal.   Neck: Normal range of motion. Neck supple. No JVD present.   Cardiovascular: Regular rhythm and intact distal pulses.   Pulmonary/Chest: Effort normal. No respiratory distress. She has no rales. She exhibits no tenderness.   Abdominal: Soft. Bowel sounds are normal. She exhibits no distension. There is no tenderness. There is no rebound and no guarding.   Musculoskeletal: Normal range  "of motion. She exhibits no edema or tenderness.   Neurological: She is alert and oriented to person, place, and time. No sensory deficit.   Skin: Skin is warm and dry. She is not diaphoretic.   Psychiatric: She has a normal mood and affect. Her behavior is normal. Judgment and thought content normal.   Nursing note and vitals reviewed.      Significant Labs:   CBC:   Recent Labs   Lab 01/11/20  0522   WBC 6.24   HGB 10.4*   HCT 34.1*        CMP:   Recent Labs   Lab 01/11/20 0522      K 4.6      CO2 23   *   BUN 18   CREATININE 0.9   CALCIUM 9.2   PROT 6.7   ALBUMIN 2.5*   BILITOT 0.2   ALKPHOS 95   AST 14   ALT 14   ANIONGAP 7*   EGFRNONAA >60.0       Assessment/Plan:      * Acute coronary syndrome  53 y/o AAF with known medical medical issues of chronic combined systolic and diastolic HF (EF 35% Grade II DD), CAD S/P MI 2010, Angiogram 12/18/19 mid LAD 90% stenosis, mid OM2 75% stenosis, and Ostial D1 70% stenosis LVDEP 25 mmHg, PAD S/P PTA with stents BLE 2015, IDDM (A1c 10.3%), HTN, HLD presenting with HARTMAN and nausea/ vomiting and "heart burn" like chest discomfort similar in characteristic for her last MI in 2010.     EKG - Sinus tachycardia without ST changes  1st Troponin - 0.176  BNP - 1,013    Seen by Cardiology in the ED who suggested ACS work up and CTS consult due to multi-vessel coronary disease  TTE: minimal changes since last echo. CVP 8 mmHg       PLAN:  -- Heparin gtt ACS protocol - for 48 hours stop date 1/4/20  --  mg in ED with 81 mg daily and Plavix load 300 mg X1 Holding Plavix in anticipation of CABG tentatively 1/7/20   -- Lasix 80mg IV BID - d/c   -- EKG PRN  -- continuous cardiac monitoring  -- CTS Consulted - tentatively planned for CABG on 1/14,  involving hybrid off pump coronary artery bypass (LIMA-LAD) with postoperative percutaneous coronary intervention to the LCx -   -- Famotidine IV 20 mg BID - for prevention of GI Bleeds in ACS work up  -- High " intensity atorvastatin 80 mg QHS  - c/w ASA, full potency statin.  - ACEI  held due to ROSLYN and borderline low BP  - Plavix stopped per CTS recommendations     Weakness  PT/OT      Contrast dye induced nephropathy  *improving  PLAN:  -- current Cr 1. Increased to 3.4> 4.2> 2.6> 1.2> 1.0  -- gave 500 cc bolus NS 1/4 - Judicious due to EF 35%  -- Avoid nephrotoxic medications if able  -- Hold furosemide, lisinopril  - UAR and retroperitoneal US unremarkable. Spun urine showing granular casts  - Nephrology on board      CAD (coronary artery disease)  CAD S/P MI 2010, Angiogram 12/18/19 mid LAD 90% stenosis, mid OM2 75% stenosis, and Ostial D1 70% stenosis LVDEP 25 mmHg    PLAN:  - CABG tentatively planned for Tuesday 1/14/2020  - c/w ASA, full potency statin   - ACEI  held due to ROSLYN and borderline low BP  - Plavix stopped per CTS recommendations     Type 2 diabetes mellitus with hyperglycemia, with long-term current use of insulin  A1c 12/2019 - 10.3% 1/3/2020 - 9.7%    BG on admission was 347. Patient reports not taking her insulin for 2 days due to low P.O. Intake  Home regimen: Aspart 9 U WM + Lantus 50 U QHS    PLAN:  -- Per Endocrine recommendation; appreciate recs:  Levemir 16 units daily and novolog 4 units with meals with meals   -- POC BG 4 X daily   -- LDSSI PRN  -- Goal -180      Acute on chronic combined systolic (congestive) and diastolic (congestive) heart failure  TTE 12/6/19: Moderately decreased left ventricular systolic function. The estimated ejection fraction is 35%. Grade II (moderate) left ventricular diastolic dysfunction consistent with pseudonormalization. Moderate global hypokinetic wall motion. Normal right ventricular systolic function. Severe left atrial enlargement. Mild-to-moderate mitral regurgitation. Mild to moderate tricuspid regurgitation. The estimated PA systolic pressure is 49 mm Hg.Pulmonary hypertension present. Normal central venous pressure (3 mm Hg).    CXR 1/2/20: Mild  interstitial edema with pulmonary vasculature congestion    TTE 1/3/20: Mild left ventricular enlargement. Moderately decreased left ventricular systolic function. The estimated ejection fraction is 35%. Grade II (moderate) left ventricular diastolic dysfunction consistent with pseudonormalization. Septal wall has abnormal motion. Local segmental wall motion abnormalities. Moderate left atrial enlargement. Normal right ventricular systolic function. Mild mitral regurgitation. Mild tricuspid regurgitation. Mild pulmonic regurgitation. Intermediate central venous pressure (8 mm Hg). The estimated PA systolic pressure is 46 mm Hg. Very small aneurysm in inferoapex visualized on image 47. The quantitatively dervived ejection fraction is 34%. Mild aortic regurgitation. Pulmonary hypertension present.    PLAN:  -- Carvedilol restarted at half dose (6.25mg BID)  -- Hold ACEi in setting of soft BP and contrast induced ROSLYN  -- IV lasix 80 mg QD - Held in the setting of ROSLYN  -- Strict I/Os  -- Daily standing weights  -- Low sodium (<2g) diet with 1500 cc fluid restriction        Mixed hyperlipidemia  Home med:  Atorvastatin 80 mg    Lipid panel 12/16/19: T cholesterol 244, HDL 46, , Triglyceride 135  Lipid panel 1/3/2020: T cholesterol 246 HDL 41 ; Triglycerides 115    PLAN:  -- Continue home statin  -- goal LDL <70     Non-intractable vomiting with nausea  On admission patient having two days of non-bloody non-bilious emesis with poor P.O. Intake    PLAN: - Improved  -- P.O. Phenergan 25 mg PRN  -- EKG daily to assess for QTc. 1/3/20 EKG QTc - 479  1/4/20 EKG Sinus rhythm with marked sinus arrythmia  Minimal voltage criteria for LVH, QT prolongation at 464      Essential hypertension  Home: Lisinopril 40 mg QD, Coreg 12.5 mg BID    PLAN:  -- Hold Lisinopril while BP is soft and patient has contrast induced ROSLYN  -- Goal BP <140/90      Peripheral vascular disease  S/P PTA with stents BLE 2015    CTA Ab/pelvis  with run off:  1. Diffuse atherosclerotic disease resulting in stenosis of the origins of the celiac artery, SMA, bilateral accessory renal arteries.  2. There is diffuse atherosclerotic disease in the bilateral lower extremities with bilateral SFA-popliteal artery stent occlusion.  The popliteal arteries reconstitute distally with collateral vessels.  3. Leiomyomatous uterus.      ROSLYN (acute kidney injury)          VTE Risk Mitigation (From admission, onward)         Ordered     enoxaparin injection 40 mg  Daily      01/10/20 0800     Place sequential compression device  Until discontinued      01/02/20 1518     IP VTE HIGH RISK PATIENT  Once      01/02/20 1518                  Viral Lo MD  Department of Hospital Medicine   [facility]

## 2020-01-12 NOTE — PLAN OF CARE
Problem: Adult Inpatient Plan of Care  Goal: Plan of Care Review  1/12/2020 0650 by Juve Tate, RN  Outcome: Ongoing, Progressing  1/12/2020 0647 by Juve Tate, RN  Outcome: Ongoing, Progressing

## 2020-01-12 NOTE — SUBJECTIVE & OBJECTIVE
Interval History:  NAEO. No labs today. Patient denies chest pain. Sugars well-controlled.      Review of Systems  Objective:     Vital Signs (Most Recent):  Temp: 98.3 °F (36.8 °C) (01/12/20 0823)  Pulse: 85 (01/12/20 0823)  Resp: 18 (01/12/20 0823)  BP: 138/77 (01/12/20 0823)  SpO2: 99 % (01/12/20 0823) Vital Signs (24h Range):  Temp:  [97.7 °F (36.5 °C)-98.5 °F (36.9 °C)] 98.3 °F (36.8 °C)  Pulse:  [73-87] 85  Resp:  [18-20] 18  SpO2:  [92 %-99 %] 99 %  BP: (111-162)/(53-90) 138/77     Weight: 98.6 kg (217 lb 6 oz)  Body mass index is 37.31 kg/m².  No intake or output data in the 24 hours ending 01/12/20 1019   Physical Exam   Constitutional: She is oriented to person, place, and time. She appears well-developed and well-nourished. She does not have a sickly appearance. She does not appear ill. No distress.   HENT:   Head: Normocephalic and atraumatic.   Eyes: Conjunctivae and EOM are normal.   Neck: Normal range of motion. Neck supple. No JVD present.   Cardiovascular: Regular rhythm and intact distal pulses.   Pulmonary/Chest: Effort normal. No respiratory distress. She has no rales. She exhibits no tenderness.   Abdominal: Soft. Bowel sounds are normal. She exhibits no distension. There is no tenderness. There is no rebound and no guarding.   Musculoskeletal: Normal range of motion. She exhibits no edema or tenderness.   Neurological: She is alert and oriented to person, place, and time. No sensory deficit.   Skin: Skin is warm and dry. She is not diaphoretic.   Psychiatric: She has a normal mood and affect. Her behavior is normal. Judgment and thought content normal.   Nursing note and vitals reviewed.      Significant Labs:   CBC:   Recent Labs   Lab 01/11/20 0522   WBC 6.24   HGB 10.4*   HCT 34.1*        CMP:   Recent Labs   Lab 01/11/20  0522      K 4.6      CO2 23   *   BUN 18   CREATININE 0.9   CALCIUM 9.2   PROT 6.7   ALBUMIN 2.5*   BILITOT 0.2   ALKPHOS 95   AST 14   ALT 14    ANIONGAP 7*   EGFRNONAA >60.0

## 2020-01-12 NOTE — CARE UPDATE
BG goal 140 - 180    BG is controlled on current SQ insulin regimen.       Continue Levemir to 16 units Nightly.   Continue Novolog to 4 units TIDWM.  Low Dose SQ Insulin Correction Scale.  BG Monitoring AC/HS      ** Please call Endocrine for any BG related issues **  ** Please notify Endocrine for any change and/or advance in diet**     Discharge Planning:     TBD. Please notify endocrinology prior to discharge. Patient will need adjustments to home insulin regimen.

## 2020-01-13 LAB
ABO + RH BLD: NORMAL
ALBUMIN SERPL BCP-MCNC: 2.4 G/DL (ref 3.5–5.2)
ALP SERPL-CCNC: 84 U/L (ref 55–135)
ALT SERPL W/O P-5'-P-CCNC: 20 U/L (ref 10–44)
ANION GAP SERPL CALC-SCNC: 9 MMOL/L (ref 8–16)
AST SERPL-CCNC: 22 U/L (ref 10–40)
BASOPHILS # BLD AUTO: 0.07 K/UL (ref 0–0.2)
BASOPHILS NFR BLD: 1.1 % (ref 0–1.9)
BILIRUB SERPL-MCNC: 0.2 MG/DL (ref 0.1–1)
BLD GP AB SCN CELLS X3 SERPL QL: NORMAL
BUN SERPL-MCNC: 23 MG/DL (ref 6–20)
CALCIUM SERPL-MCNC: 8.9 MG/DL (ref 8.7–10.5)
CHLORIDE SERPL-SCNC: 110 MMOL/L (ref 95–110)
CO2 SERPL-SCNC: 18 MMOL/L (ref 23–29)
CREAT SERPL-MCNC: 1 MG/DL (ref 0.5–1.4)
DIFFERENTIAL METHOD: ABNORMAL
EOSINOPHIL # BLD AUTO: 0.2 K/UL (ref 0–0.5)
EOSINOPHIL NFR BLD: 2.9 % (ref 0–8)
ERYTHROCYTE [DISTWIDTH] IN BLOOD BY AUTOMATED COUNT: 12.9 % (ref 11.5–14.5)
EST. GFR  (AFRICAN AMERICAN): >60 ML/MIN/1.73 M^2
EST. GFR  (NON AFRICAN AMERICAN): >60 ML/MIN/1.73 M^2
GLUCOSE SERPL-MCNC: 163 MG/DL (ref 70–110)
HCT VFR BLD AUTO: 32.8 % (ref 37–48.5)
HGB BLD-MCNC: 10 G/DL (ref 12–16)
IMM GRANULOCYTES # BLD AUTO: 0.01 K/UL (ref 0–0.04)
IMM GRANULOCYTES NFR BLD AUTO: 0.2 % (ref 0–0.5)
LYMPHOCYTES # BLD AUTO: 2.1 K/UL (ref 1–4.8)
LYMPHOCYTES NFR BLD: 31.2 % (ref 18–48)
MAGNESIUM SERPL-MCNC: 1.8 MG/DL (ref 1.6–2.6)
MCH RBC QN AUTO: 30.5 PG (ref 27–31)
MCHC RBC AUTO-ENTMCNC: 30.5 G/DL (ref 32–36)
MCV RBC AUTO: 100 FL (ref 82–98)
MONOCYTES # BLD AUTO: 0.8 K/UL (ref 0.3–1)
MONOCYTES NFR BLD: 11.5 % (ref 4–15)
NEUTROPHILS # BLD AUTO: 3.5 K/UL (ref 1.8–7.7)
NEUTROPHILS NFR BLD: 53.1 % (ref 38–73)
NRBC BLD-RTO: 0 /100 WBC
PHOSPHATE SERPL-MCNC: 3.8 MG/DL (ref 2.7–4.5)
PLATELET # BLD AUTO: 276 K/UL (ref 150–350)
PMV BLD AUTO: 10.4 FL (ref 9.2–12.9)
POCT GLUCOSE: 122 MG/DL (ref 70–110)
POCT GLUCOSE: 141 MG/DL (ref 70–110)
POCT GLUCOSE: 171 MG/DL (ref 70–110)
POCT GLUCOSE: 195 MG/DL (ref 70–110)
POTASSIUM SERPL-SCNC: 4.7 MMOL/L (ref 3.5–5.1)
PROT SERPL-MCNC: 6.3 G/DL (ref 6–8.4)
RBC # BLD AUTO: 3.28 M/UL (ref 4–5.4)
SODIUM SERPL-SCNC: 137 MMOL/L (ref 136–145)
WBC # BLD AUTO: 6.6 K/UL (ref 3.9–12.7)

## 2020-01-13 PROCEDURE — 83735 ASSAY OF MAGNESIUM: CPT

## 2020-01-13 PROCEDURE — 25000003 PHARM REV CODE 250: Performed by: INTERNAL MEDICINE

## 2020-01-13 PROCEDURE — 63600175 PHARM REV CODE 636 W HCPCS: Performed by: STUDENT IN AN ORGANIZED HEALTH CARE EDUCATION/TRAINING PROGRAM

## 2020-01-13 PROCEDURE — 25000003 PHARM REV CODE 250: Performed by: STUDENT IN AN ORGANIZED HEALTH CARE EDUCATION/TRAINING PROGRAM

## 2020-01-13 PROCEDURE — S0077 INJECTION, CLINDAMYCIN PHOSP: HCPCS | Performed by: STUDENT IN AN ORGANIZED HEALTH CARE EDUCATION/TRAINING PROGRAM

## 2020-01-13 PROCEDURE — 99232 PR SUBSEQUENT HOSPITAL CARE,LEVL II: ICD-10-PCS | Mod: ,,, | Performed by: INTERNAL MEDICINE

## 2020-01-13 PROCEDURE — 11000001 HC ACUTE MED/SURG PRIVATE ROOM

## 2020-01-13 PROCEDURE — 25000003 PHARM REV CODE 250: Performed by: SURGERY

## 2020-01-13 PROCEDURE — 94761 N-INVAS EAR/PLS OXIMETRY MLT: CPT

## 2020-01-13 PROCEDURE — 86920 COMPATIBILITY TEST SPIN: CPT

## 2020-01-13 PROCEDURE — 99231 SBSQ HOSP IP/OBS SF/LOW 25: CPT | Mod: ,,, | Performed by: NURSE PRACTITIONER

## 2020-01-13 PROCEDURE — 85025 COMPLETE CBC W/AUTO DIFF WBC: CPT

## 2020-01-13 PROCEDURE — 99231 PR SUBSEQUENT HOSPITAL CARE,LEVL I: ICD-10-PCS | Mod: ,,, | Performed by: NURSE PRACTITIONER

## 2020-01-13 PROCEDURE — 36415 COLL VENOUS BLD VENIPUNCTURE: CPT

## 2020-01-13 PROCEDURE — 99232 SBSQ HOSP IP/OBS MODERATE 35: CPT | Mod: ,,, | Performed by: INTERNAL MEDICINE

## 2020-01-13 PROCEDURE — 84100 ASSAY OF PHOSPHORUS: CPT

## 2020-01-13 PROCEDURE — 86901 BLOOD TYPING SEROLOGIC RH(D): CPT

## 2020-01-13 PROCEDURE — 80053 COMPREHEN METABOLIC PANEL: CPT

## 2020-01-13 RX ORDER — ATORVASTATIN CALCIUM 20 MG/1
80 TABLET, FILM COATED ORAL NIGHTLY
Status: DISCONTINUED | OUTPATIENT
Start: 2020-01-13 | End: 2020-01-14

## 2020-01-13 RX ORDER — CEFTRIAXONE 1 G/1
1 INJECTION, POWDER, FOR SOLUTION INTRAMUSCULAR; INTRAVENOUS
Status: DISCONTINUED | OUTPATIENT
Start: 2020-01-14 | End: 2020-01-14

## 2020-01-13 RX ORDER — METOPROLOL TARTRATE 25 MG/1
25 TABLET, FILM COATED ORAL
Status: DISCONTINUED | OUTPATIENT
Start: 2020-01-13 | End: 2020-01-14

## 2020-01-13 RX ORDER — CLINDAMYCIN HYDROCHLORIDE 150 MG/1
300 CAPSULE ORAL EVERY 6 HOURS
Status: DISCONTINUED | OUTPATIENT
Start: 2020-01-13 | End: 2020-01-14

## 2020-01-13 RX ORDER — CLINDAMYCIN PHOSPHATE 600 MG/50ML
600 INJECTION, SOLUTION INTRAVENOUS
Status: DISCONTINUED | OUTPATIENT
Start: 2020-01-13 | End: 2020-01-13

## 2020-01-13 RX ORDER — CEFTRIAXONE 1 G/1
1 INJECTION, POWDER, FOR SOLUTION INTRAMUSCULAR; INTRAVENOUS
Status: DISCONTINUED | OUTPATIENT
Start: 2020-01-13 | End: 2020-01-13

## 2020-01-13 RX ORDER — MUPIROCIN 20 MG/G
OINTMENT TOPICAL
Status: DISCONTINUED | OUTPATIENT
Start: 2020-01-13 | End: 2020-01-14

## 2020-01-13 RX ORDER — CARVEDILOL 3.12 MG/1
6.25 TABLET ORAL 2 TIMES DAILY WITH MEALS
Status: DISCONTINUED | OUTPATIENT
Start: 2020-01-14 | End: 2020-01-14

## 2020-01-13 RX ORDER — LEVOFLOXACIN 5 MG/ML
750 INJECTION, SOLUTION INTRAVENOUS
Status: DISCONTINUED | OUTPATIENT
Start: 2020-01-13 | End: 2020-01-13

## 2020-01-13 RX ADMIN — GABAPENTIN 300 MG: 300 CAPSULE ORAL at 03:01

## 2020-01-13 RX ADMIN — INSULIN DETEMIR 16 UNITS: 100 INJECTION, SOLUTION SUBCUTANEOUS at 10:01

## 2020-01-13 RX ADMIN — GABAPENTIN 300 MG: 300 CAPSULE ORAL at 09:01

## 2020-01-13 RX ADMIN — CARVEDILOL 6.25 MG: 6.25 TABLET, FILM COATED ORAL at 05:01

## 2020-01-13 RX ADMIN — FAMOTIDINE 20 MG: 20 TABLET ORAL at 10:01

## 2020-01-13 RX ADMIN — GABAPENTIN 300 MG: 300 CAPSULE ORAL at 10:01

## 2020-01-13 RX ADMIN — ATORVASTATIN CALCIUM 80 MG: 20 TABLET, FILM COATED ORAL at 09:01

## 2020-01-13 RX ADMIN — CARVEDILOL 6.25 MG: 6.25 TABLET, FILM COATED ORAL at 09:01

## 2020-01-13 RX ADMIN — INSULIN ASPART 4 UNITS: 100 INJECTION, SOLUTION INTRAVENOUS; SUBCUTANEOUS at 12:01

## 2020-01-13 RX ADMIN — ATORVASTATIN CALCIUM 80 MG: 20 TABLET, FILM COATED ORAL at 10:01

## 2020-01-13 RX ADMIN — ESCITALOPRAM OXALATE 10 MG: 10 TABLET ORAL at 09:01

## 2020-01-13 RX ADMIN — FAMOTIDINE 20 MG: 20 TABLET ORAL at 09:01

## 2020-01-13 RX ADMIN — CLINDAMYCIN HYDROCHLORIDE 300 MG: 150 CAPSULE ORAL at 11:01

## 2020-01-13 RX ADMIN — INSULIN ASPART 4 UNITS: 100 INJECTION, SOLUTION INTRAVENOUS; SUBCUTANEOUS at 08:01

## 2020-01-13 RX ADMIN — CEFTRIAXONE SODIUM 1 G: 1 INJECTION, POWDER, FOR SOLUTION INTRAMUSCULAR; INTRAVENOUS at 10:01

## 2020-01-13 RX ADMIN — INSULIN ASPART 4 UNITS: 100 INJECTION, SOLUTION INTRAVENOUS; SUBCUTANEOUS at 05:01

## 2020-01-13 RX ADMIN — ASPIRIN 81 MG CHEWABLE TABLET 81 MG: 81 TABLET CHEWABLE at 09:01

## 2020-01-13 RX ADMIN — CLINDAMYCIN IN 5 PERCENT DEXTROSE 600 MG: 12 INJECTION, SOLUTION INTRAVENOUS at 09:01

## 2020-01-13 RX ADMIN — CLINDAMYCIN HYDROCHLORIDE 300 MG: 150 CAPSULE ORAL at 05:01

## 2020-01-13 NOTE — PROGRESS NOTES
"[facility]  Endocrinology  Progress Note    Admit Date: 2020     Reason for Consult: Management of T2DM, Hyperglycemia     Surgical Procedure and Date: n/a    Diabetes diagnosis year:      Home Diabetes Medications:  Lantus 50 units daily and novolog 9 units with meals   Lab Results   Component Value Date    HGBA1C 9.7 (H) 2020         How often checking glucose at home? 1-3 x day   BG readings on regimen: 100-200 typically; sometimes in 300s   Hypoglycemia on the regimen?  Yes occasionally - usually overnight   Missed doses on regimen?  Yes    Diabetes Complications include:     Hyperglycemia, Hypoglycemia  and Diabetic peripheral neuropathy     Complicating diabetes co morbidities:   History of MI and CHF      HPI:   Patient is a 54 y.o. female with a diagnosis of uncontrolled type 2 DM. Also with CHF, PAD, HTN, HLD, and CAD. Patient presented with chest pain and diaphoresis. Also with nauseated, diaphoretic, a mild headache, epigastric pain, with a vague "heart burn" like feeling in her chest. Patient admitted for treatment and further workup. Endocrinology consulted for BG/ DM management.             Interval HPI:   Overnight events:  BG is within goal on current SQ insulin regimen.     Diet [facility]; Consistent Carbohydrate  Diet NPO Except for: Medication, Sips with Medication    Eatin%  Nausea: No  Hypoglycemia and intervention: No  Fever: No  TPN and/or TF: No  If yes, type of TF/TPN and rate: None    BP (!) 158/73 (BP Location: Right arm, Patient Position: Sitting)   Pulse 84   Temp 98.5 °F (36.9 °C) (Oral)   Resp 14   Ht 5' 4" (1.626 m)   Wt 98.6 kg (217 lb 6 oz)   LMP 2015 (Exact Date)   SpO2 97%   Breastfeeding? No   BMI 37.31 kg/m²      Labs Reviewed and Include    Recent Labs   Lab 20  0424   *   CALCIUM 8.9   ALBUMIN 2.4*   PROT 6.3      K 4.7   CO2 18*      BUN 23*   CREATININE 1.0   ALKPHOS 84   ALT 20 "   AST 22   BILITOT 0.2     Lab Results   Component Value Date    WBC 6.60 01/13/2020    HGB 10.0 (L) 01/13/2020    HCT 32.8 (L) 01/13/2020     (H) 01/13/2020     01/13/2020     No results for input(s): TSH, FREET4 in the last 168 hours.  Lab Results   Component Value Date    HGBA1C 9.7 (H) 01/03/2020       Nutritional status:   Body mass index is 37.31 kg/m².  Lab Results   Component Value Date    ALBUMIN 2.4 (L) 01/13/2020    ALBUMIN 2.5 (L) 01/11/2020    ALBUMIN 2.6 (L) 01/09/2020     No results found for: PREALBUMIN    Estimated Creatinine Clearance: 74.3 mL/min (based on SCr of 1 mg/dL).    Accu-Checks  Recent Labs     01/10/20  2016 01/11/20  0750 01/11/20  1134 01/11/20  1638 01/11/20  2155 01/12/20  0749 01/12/20  1145 01/12/20  1655 01/12/20  2004 01/13/20  0838   POCTGLUCOSE 120* 161* 142* 150* 170* 166* 149* 159* 231* 171*       Current Medications and/or Treatments Impacting Glycemic Control  Immunotherapy:    Immunosuppressants     None        Steroids:   Hormones (From admission, onward)    None        Pressors:    Autonomic Drugs (From admission, onward)    None        Hyperglycemia/Diabetes Medications:   Antihyperglycemics (From admission, onward)    Start     Stop Route Frequency Ordered    01/09/20 2100  insulin detemir U-100 pen 16 Units      -- SubQ Nightly 01/09/20 0931    01/09/20 1645  insulin aspart U-100 pen 4 Units      -- SubQ 3 times daily with meals 01/09/20 1319    01/04/20 0846  insulin aspart U-100 pen 0-5 Units      -- SubQ Before meals & nightly PRN 01/04/20 0746          ASSESSMENT and PLAN    * Acute coronary syndrome  Managed per primary team  Avoid hypoglycemia        Type 2 diabetes mellitus with hyperglycemia, with long-term current use of insulin  BG goal 140 - 180     BG is within goal and reasonably controlled on current SQ insulin MDI regimen.     Continue Levemir to 16 units Nightly.   Continue Novolog to 4 units TIDWM.   Low Dose SQ Insulin Correction  "Scale.  BG Monitoring AC/HS     ** Please call Endocrine for any BG related issues **  ** Please notify Endocrine for any change and/or advance in diet**    Discharge Planning:     TBD. Please notify endocrinology prior to discharge. Patient will need adjustments to home insulin regimen.     Tentative:     Levemir 20 units HS.     Novolog 7 units TIDWM.     Low Dose SQ Insulin Correction Scale.    BG Monitoring AC/HS     PRN Insurance preferred diabetes testing supplies    PRN Ultra-Fine Junie Pen Needles 4mm x 32 G (5/32" x 0.23mm).     Patient request outpatient follow-up with [facility] for continued DM management.          ROSLYN (acute kidney injury)  Caution with insulin stacking            Cody Jameson NP  Endocrinology  [facility]  "

## 2020-01-13 NOTE — ASSESSMENT & PLAN NOTE
Pt is a 53 y/o AAF with known medical medical issues of chronic combined systolic and diastolic HF (EF 35% Grade II DD), CAD S/P MI 2010, PAD S/P PTA with stents BLE 2015, IDDM (A1c 10.3%), DM who presented w/ N and V found to have NSTEMI.   Pt with CABG scheduled for tomorrow.  ID consulted as pt w/ hx of tooth abscess.      Pt denies any symptoms currently reports feeling great and ready for surgery.  She report previously having dental abscess on left side in 2016.  Reports currently she does not have abscess but in the past, she has developed a knot around her right jawline near her wisdom teeth that comes and goes.  Typically not requiring antibiotics for improvement.    Pt afebrile w/o white count. Blood cxs NGTD.  Exam with no redness, or swelling.  Pt w/o lymphadenopathy.    Plan  -Pt w/o obvious signs of active infection.  Ok from ID standpoint to proceed with surgery.  Recommend normal perioperative abx per surgical protocol.  -Recommended pt to seek dental care after sx  -ID will sign off.

## 2020-01-13 NOTE — PLAN OF CARE
01/13/20 1653   Discharge Reassessment   Assessment Type Discharge Planning Reassessment   Discharge Plan A Home with family   Discharge Plan B Home with family;Home Health     Documentation states CABG tentatively planned for Tuesday 1/14/2020.  CM to continue to follow for ongoing medical needs.

## 2020-01-13 NOTE — SUBJECTIVE & OBJECTIVE
Past Medical History:   Diagnosis Date    Anxiety     Depression     Diabetes mellitus     type 2    GERD (gastroesophageal reflux disease)     Hyperlipemia     Hypertension     Myocardial infarction     minor-caused by stress per pt.       Past Surgical History:   Procedure Laterality Date    Angiogram - Right Extremity Right 7/9/15    angiogram-left leg  10/6/15    CATHETERIZATION OF BOTH LEFT AND RIGHT HEART N/A 2019    Procedure: CATHETERIZATION, HEART, BOTH LEFT AND RIGHT;  Surgeon: Que Fernando III, MD;  Location: [facility] CATH LAB;  Service: Cardiology;  Laterality: N/A;    CORONARY ANGIOGRAPHY N/A 2019    Procedure: ANGIOGRAM, CORONARY ARTERY;  Surgeon: Que Fernando III, MD;  Location: [facility] CATH LAB;  Service: Cardiology;  Laterality: N/A;       Review of patient's allergies indicates:   Allergen Reactions    Contrast media      Kidney injury    Pcn [penicillins]      Rash; tolerated ceftriaxone on 20       Medications:  Medications Prior to Admission   Medication Sig    acetaminophen (TYLENOL) 500 MG tablet Take 1,000 mg by mouth daily as needed for Pain.    aspirin 81 MG Chew Take 1 tablet (81 mg total) by mouth once daily.    atorvastatin (LIPITOR) 80 MG tablet Take 1 tablet (80 mg total) by mouth once daily.    blood sugar diagnostic (ONETOUCH ULTRA BLUE TEST STRIP) Strp Use to test blood glucose twice daily (Patient taking differently: Tests 3 times daily)    blood-glucose meter Misc Use to test blood glucose    carvedilol (COREG) 12.5 MG tablet Take 1 tablet (12.5 mg total) by mouth 2 (two) times daily with meals.    escitalopram oxalate (LEXAPRO) 10 MG tablet Take 1 tablet (10 mg total) by mouth once daily.    famotidine (PEPCID) 20 MG tablet Take 1 tablet (20 mg total) by mouth 2 (two) times daily.    gabapentin (NEURONTIN) 300 MG capsule Take 1 capsule (300 mg total) by mouth 3 (three) times daily.    [] hydrOXYzine pamoate (VISTARIL) 50 MG  "Cap Take 1 capsule (50 mg total) by mouth every 6 (six) hours as needed (anxiety).    insulin aspart U-100 (NOVOLOG) 100 unit/mL injection Inject 9 Units into the skin 3 (three) times daily with meals. (Patient taking differently: Inject 8-9 Units into the skin 3 (three) times daily with meals. )    insulin glargine 100 units/mL (3mL) SubQ pen Inject 50 Units into the skin every evening.    lancets 30 gauge Misc use to test blood glucose 2 (two) times daily with meals.    lisinopril (PRINIVIL,ZESTRIL) 40 MG tablet Take 1 tablet (40 mg total) by mouth once daily.    multivitamin (THERAGRAN) per tablet Take 1 tablet by mouth once daily.    ondansetron (ZOFRAN-ODT) 8 MG TbDL Dissolve 1 tablet (8 mg total) by mouth every 8 (eight) hours as needed.    [] pen needle, diabetic 31 gauge x 5/16" Ndle Use to inject insulin 2 (two) times daily with meals.    torsemide (DEMADEX) 20 MG Tab Take 2 tablets (40 mg total) by mouth once daily. (Patient taking differently: Take 1 tablet by mouth daily ( as needed if weight gain patient takes 2 tablets daily ))    [] pen needle, diabetic 32 gauge x 5/32" Ndle 1 each by Misc.(Non-Drug; Combo Route) route 2 (two) times daily with meals.     Antibiotics (From admission, onward)    Start     Stop Route Frequency Ordered    20 1045  cefTRIAXone injection 1 g      -- IV Every 24 hours (non-standard times) 20 1351    20 1800  clindamycin capsule 300 mg      -- Oral Every 6 hours 20 1409        Antifungals (From admission, onward)    None        Antivirals (From admission, onward)    None             There is no immunization history on file for this patient.    Family History     None        Social History     Socioeconomic History    Marital status: Legally      Spouse name: Not on file    Number of children: Not on file    Years of education: Not on file    Highest education level: Not on file   Occupational History    Not on file "   Social Needs    Financial resource strain: Not on file    Food insecurity:     Worry: Not on file     Inability: Not on file    Transportation needs:     Medical: Not on file     Non-medical: Not on file   Tobacco Use    Smoking status: Never Smoker    Smokeless tobacco: Never Used   Substance and Sexual Activity    Alcohol use: No    Drug use: Yes     Types: Marijuana     Comment: used yesterday    Sexual activity: Not on file   Lifestyle    Physical activity:     Days per week: Not on file     Minutes per session: Not on file    Stress: Not on file   Relationships    Social connections:     Talks on phone: Not on file     Gets together: Not on file     Attends [employer] service: Not on file     Active member of club or organization: Not on file     Attends meetings of clubs or organizations: Not on file     Relationship status: Not on file   Other Topics Concern    Not on file   Social History Narrative    Not on file     Review of Systems   Constitutional: Negative for activity change, appetite change, chills, fatigue and fever.   HENT: Negative for dental problem, ear pain, facial swelling, sneezing, sore throat and trouble swallowing.    Respiratory: Negative for apnea, choking, chest tightness and shortness of breath.    Cardiovascular: Negative for chest pain and leg swelling.   Gastrointestinal: Negative for abdominal distention, abdominal pain, constipation, diarrhea, nausea and vomiting.   Genitourinary: Negative for difficulty urinating and dysuria.   Musculoskeletal: Negative for arthralgias and back pain.   Skin: Negative for rash and wound.     Objective:     Vital Signs (Most Recent):  Temp: 97.9 °F (36.6 °C) (01/13/20 1214)  Pulse: 77 (01/13/20 1214)  Resp: 18 (01/13/20 1214)  BP: (!) 142/70 (01/13/20 1214)  SpO2: 97 % (01/13/20 1214) Vital Signs (24h Range):  Temp:  [97.4 °F (36.3 °C)-98.5 °F (36.9 °C)] 97.9 °F (36.6 °C)  Pulse:  [77-91] 77  Resp:  [12-18] 18  SpO2:  [85 %-99 %] 97  %  BP: (132-158)/(59-76) 142/70     Weight: 98.6 kg (217 lb 6 oz)  Body mass index is 37.31 kg/m².    Estimated Creatinine Clearance: 74.3 mL/min (based on SCr of 1 mg/dL).    Physical Exam   Constitutional: She is oriented to person, place, and time. She appears well-developed.   HENT:   Head: Normocephalic and atraumatic.   Right Ear: External ear normal.   Left Ear: External ear normal.   Nose: Nose normal.   Mouth/Throat: Oropharynx is clear and moist. She does not have dentures. No oral lesions. Abnormal dentition. Dental caries present. No dental abscesses or lacerations. No oropharyngeal exudate.   Neck: Normal range of motion.   Cardiovascular: Normal rate, regular rhythm and normal heart sounds.   No murmur heard.  Pulmonary/Chest: Effort normal and breath sounds normal. No respiratory distress. She has no wheezes. She has no rales.   Abdominal: Soft. She exhibits no distension and no mass. There is no tenderness.   Lymphadenopathy:        Head (right side): No submental, no submandibular, no tonsillar, no preauricular, no posterior auricular and no occipital adenopathy present.        Head (left side): No submental, no submandibular, no tonsillar, no preauricular and no posterior auricular adenopathy present.     She has no cervical adenopathy.        Right cervical: No superficial cervical, no deep cervical and no posterior cervical adenopathy present.       Left cervical: No superficial cervical, no deep cervical and no posterior cervical adenopathy present.   Neurological: She is alert and oriented to person, place, and time.   Skin: Skin is warm and dry. No erythema.       Significant Labs: All pertinent labs within the past 24 hours have been reviewed.    Significant Imaging: I have reviewed all pertinent imaging results/findings within the past 24 hours.

## 2020-01-13 NOTE — PROGRESS NOTES
Pharmacist Intervention IV to PO Note    Suyapa Connelly is a 54 y.o. female being treated with IV medication clindamycin    Patient Data:    Vital Signs (Most Recent):  Temp: 97.9 °F (36.6 °C) (01/13/20 1214)  Pulse: 77 (01/13/20 1214)  Resp: 18 (01/13/20 1214)  BP: (!) 142/70 (01/13/20 1214)  SpO2: 97 % (01/13/20 1214)   Vital Signs (72h Range):  Temp:  [97.4 °F (36.3 °C)-98.5 °F (36.9 °C)]   Pulse:  [73-91]   Resp:  [12-20]   BP: (110-162)/(53-90)   SpO2:  [85 %-99 %]      CBC:  Recent Labs   Lab 01/09/20 0341 01/11/20  0522 01/13/20  0424   WBC 7.10 6.24 6.60   RBC 3.56* 3.40* 3.28*   HGB 10.8* 10.4* 10.0*   HCT 35.0* 34.1* 32.8*    274 276   MCV 98 100* 100*   MCH 30.3 30.6 30.5   MCHC 30.9* 30.5* 30.5*     CMP:     Recent Labs   Lab 01/09/20 0341 01/11/20  0522 01/13/20  0424   GLU 93 177* 163*   CALCIUM 9.4 9.2 8.9   ALBUMIN 2.6* 2.5* 2.4*   PROT 6.9 6.7 6.3    137 137   K 4.4 4.6 4.7   CO2 26 23 18*    107 110   BUN 20 18 23*   CREATININE 1.0 0.9 1.0   ALKPHOS 82 95 84   ALT 12 14 20   AST 12 14 22   BILITOT 0.3 0.2 0.2       Dietary Orders:  Diet Orders            Diet NPO Except for: Medication, Sips with Medication: NPO starting at 01/14 0000    Diet [facility]; Consistent Carbohydrate: Cardiac starting at 01/07 0747            Based on the following criteria, this patient qualifies for intravenous to oral conversion:  [x] The patients gastrointestinal tract is functioning (tolerating medications via oral or enteral route for 24 hours and tolerating food or enteral feeds for 24 hours).  [x] The patient is hemodynamically stable for 24 hours (heart rate <100 beats per minute, systolic blood pressure >99 mm Hg, and respiratory rate <20 breaths per minute).  [x] The patient shows clinical improvement (afebrile for at least 24 hours and white blood cell count downtrending or normalized). Additionally, the patient must be non-neutropenic (absolute neutrophil count >500  cells/mm3).  [x] For antimicrobials, the patient has received IV therapy for at least 24 hours.    Clindamycin 600 mg IV q8h changed to 300 mg PO q6h    Pharmacist's Name: MARINA PALACIOS  Pharmacist's Extension: [phone]

## 2020-01-13 NOTE — PLAN OF CARE
Problem: Adult Inpatient Plan of Care  Goal: Patient-Specific Goal (Individualization)  Outcome: Ongoing, Progressing

## 2020-01-13 NOTE — CONSULTS
[facility]  Infectious Disease  Consult Note    Patient Name: Suyapa Connelly  MRN: [id number]  Admission Date: 1/2/2020  Hospital Length of Stay: 11 days  Attending Physician: Lulú Macias MD  Primary Care Provider: Erlinda Rahman NP     Isolation Status: No active isolations    Patient information was obtained from past medical records and ER records.      Inpatient consult to Infectious Diseases  Consult performed by: Yony Cardenas PA-C  Consult ordered by: Genny Ortez NP        Assessment/Plan:     Acute on chronic combined systolic (congestive) and diastolic (congestive) heart failure  Pt is a 55 y/o AAF with known medical medical issues of chronic combined systolic and diastolic HF (EF 35% Grade II DD), CAD S/P MI 2010, PAD S/P PTA with stents BLE 2015, IDDM (A1c 10.3%), DM who presented w/ N and V found to have NSTEMI.   Pt with CABG scheduled for tomorrow.  ID consulted as pt w/ hx of tooth abscess.      Pt denies any symptoms currently reports feeling great and ready for surgery.  She report previously having dental abscess on left side in 2016.  Reports currently she does not have abscess but in the past, she has developed a knot around her right jawline near her wisdom teeth that comes and goes.  Typically not requiring antibiotics for improvement.    Pt afebrile w/o white count. Blood cxs NGTD.  Exam with no redness, or swelling.  Pt w/o lymphadenopathy.    Plan  -Pt w/o obvious signs of active infection.  Ok from ID standpoint to proceed with surgery.  Recommend normal perioperative abx per surgical protocol.  -Recommended pt to seek dental care after sx  -ID will sign off.        Thank you for your consult. I will sign off. Please contact us if you have any additional questions.    Yony Cardenas PA-C  Infectious Disease  [facility]    Subjective:     Principal Problem: Acute coronary syndrome    HPI: Pt is a 55 y/o AAF with known  medical medical issues of chronic combined systolic and diastolic HF (EF 35% Grade II DD), CAD S/P MI 2010, PAD S/P PTA with stents BLE 2015, IDDM (A1c 10.3%), DM who presented w/ N and V found to have NSTEMI.   Pt with CABG scheduled for tomorrow.  ID consulted as pt w/ hx of tooth abscess.    Pt denies any symptoms currently reports feeling great and ready for surgery.  She report previously having dental abscess on left side in 2016.  Reports currently she does not have abscess but in the past, she has developed a knot around her right jawline near her wisdom teeth that comes and goes.  Typically not requiring antibiotics for improvement.    Pt afebrile w/o white count. Blood cxs NGTD.    Past Medical History:   Diagnosis Date    Anxiety     Depression     Diabetes mellitus     type 2    GERD (gastroesophageal reflux disease)     Hyperlipemia     Hypertension     Myocardial infarction 2010    minor-caused by stress per pt.       Past Surgical History:   Procedure Laterality Date    Angiogram - Right Extremity Right 7/9/15    angiogram-left leg  10/6/15    CATHETERIZATION OF BOTH LEFT AND RIGHT HEART N/A 12/18/2019    Procedure: CATHETERIZATION, HEART, BOTH LEFT AND RIGHT;  Surgeon: Que Fernando III, MD;  Location: [facility] CATH LAB;  Service: Cardiology;  Laterality: N/A;    CORONARY ANGIOGRAPHY N/A 12/18/2019    Procedure: ANGIOGRAM, CORONARY ARTERY;  Surgeon: Que Fernando III, MD;  Location: [facility] CATH LAB;  Service: Cardiology;  Laterality: N/A;       Review of patient's allergies indicates:   Allergen Reactions    Contrast media      Kidney injury    Pcn [penicillins]      Rash; tolerated ceftriaxone on 1/13/20       Medications:  Medications Prior to Admission   Medication Sig    acetaminophen (TYLENOL) 500 MG tablet Take 1,000 mg by mouth daily as needed for Pain.    aspirin 81 MG Chew Take 1 tablet (81 mg total) by mouth once daily.    atorvastatin (LIPITOR) 80 MG tablet Take 1  "tablet (80 mg total) by mouth once daily.    blood sugar diagnostic (ONETOUCH ULTRA BLUE TEST STRIP) Strp Use to test blood glucose twice daily (Patient taking differently: Tests 3 times daily)    blood-glucose meter Misc Use to test blood glucose    carvedilol (COREG) 12.5 MG tablet Take 1 tablet (12.5 mg total) by mouth 2 (two) times daily with meals.    escitalopram oxalate (LEXAPRO) 10 MG tablet Take 1 tablet (10 mg total) by mouth once daily.    famotidine (PEPCID) 20 MG tablet Take 1 tablet (20 mg total) by mouth 2 (two) times daily.    gabapentin (NEURONTIN) 300 MG capsule Take 1 capsule (300 mg total) by mouth 3 (three) times daily.    [] hydrOXYzine pamoate (VISTARIL) 50 MG Cap Take 1 capsule (50 mg total) by mouth every 6 (six) hours as needed (anxiety).    insulin aspart U-100 (NOVOLOG) 100 unit/mL injection Inject 9 Units into the skin 3 (three) times daily with meals. (Patient taking differently: Inject 8-9 Units into the skin 3 (three) times daily with meals. )    insulin glargine 100 units/mL (3mL) SubQ pen Inject 50 Units into the skin every evening.    lancets 30 gauge Misc use to test blood glucose 2 (two) times daily with meals.    lisinopril (PRINIVIL,ZESTRIL) 40 MG tablet Take 1 tablet (40 mg total) by mouth once daily.    multivitamin (THERAGRAN) per tablet Take 1 tablet by mouth once daily.    ondansetron (ZOFRAN-ODT) 8 MG TbDL Dissolve 1 tablet (8 mg total) by mouth every 8 (eight) hours as needed.    [] pen needle, diabetic 31 gauge x 5/16" Ndle Use to inject insulin 2 (two) times daily with meals.    torsemide (DEMADEX) 20 MG Tab Take 2 tablets (40 mg total) by mouth once daily. (Patient taking differently: Take 1 tablet by mouth daily ( as needed if weight gain patient takes 2 tablets daily ))    [] pen needle, diabetic 32 gauge x 5/32" Ndle 1 each by Misc.(Non-Drug; Combo Route) route 2 (two) times daily with meals.     Antibiotics (From admission, " onward)    Start     Stop Route Frequency Ordered    01/14/20 1045  cefTRIAXone injection 1 g      -- IV Every 24 hours (non-standard times) 01/13/20 1351    01/13/20 1800  clindamycin capsule 300 mg      -- Oral Every 6 hours 01/13/20 1409        Antifungals (From admission, onward)    None        Antivirals (From admission, onward)    None             There is no immunization history on file for this patient.    Family History     None        Social History     Socioeconomic History    Marital status: Legally      Spouse name: Not on file    Number of children: Not on file    Years of education: Not on file    Highest education level: Not on file   Occupational History    Not on file   Social Needs    Financial resource strain: Not on file    Food insecurity:     Worry: Not on file     Inability: Not on file    Transportation needs:     Medical: Not on file     Non-medical: Not on file   Tobacco Use    Smoking status: Never Smoker    Smokeless tobacco: Never Used   Substance and Sexual Activity    Alcohol use: No    Drug use: Yes     Types: Marijuana     Comment: used yesterday    Sexual activity: Not on file   Lifestyle    Physical activity:     Days per week: Not on file     Minutes per session: Not on file    Stress: Not on file   Relationships    Social connections:     Talks on phone: Not on file     Gets together: Not on file     Attends Synagogue service: Not on file     Active member of club or organization: Not on file     Attends meetings of clubs or organizations: Not on file     Relationship status: Not on file   Other Topics Concern    Not on file   Social History Narrative    Not on file     Review of Systems   Constitutional: Negative for activity change, appetite change, chills, fatigue and fever.   HENT: Negative for dental problem, ear pain, facial swelling, sneezing, sore throat and trouble swallowing.    Respiratory: Negative for apnea, choking, chest tightness and  shortness of breath.    Cardiovascular: Negative for chest pain and leg swelling.   Gastrointestinal: Negative for abdominal distention, abdominal pain, constipation, diarrhea, nausea and vomiting.   Genitourinary: Negative for difficulty urinating and dysuria.   Musculoskeletal: Negative for arthralgias and back pain.   Skin: Negative for rash and wound.     Objective:     Vital Signs (Most Recent):  Temp: 97.9 °F (36.6 °C) (01/13/20 1214)  Pulse: 77 (01/13/20 1214)  Resp: 18 (01/13/20 1214)  BP: (!) 142/70 (01/13/20 1214)  SpO2: 97 % (01/13/20 1214) Vital Signs (24h Range):  Temp:  [97.4 °F (36.3 °C)-98.5 °F (36.9 °C)] 97.9 °F (36.6 °C)  Pulse:  [77-91] 77  Resp:  [12-18] 18  SpO2:  [85 %-99 %] 97 %  BP: (132-158)/(59-76) 142/70     Weight: 98.6 kg (217 lb 6 oz)  Body mass index is 37.31 kg/m².    Estimated Creatinine Clearance: 74.3 mL/min (based on SCr of 1 mg/dL).    Physical Exam   Constitutional: She is oriented to person, place, and time. She appears well-developed.   HENT:   Head: Normocephalic and atraumatic.   Right Ear: External ear normal.   Left Ear: External ear normal.   Nose: Nose normal.   Mouth/Throat: Oropharynx is clear and moist. She does not have dentures. No oral lesions. Abnormal dentition. Dental caries present. No dental abscesses or lacerations. No oropharyngeal exudate.   Neck: Normal range of motion.   Cardiovascular: Normal rate, regular rhythm and normal heart sounds.   No murmur heard.  Pulmonary/Chest: Effort normal and breath sounds normal. No respiratory distress. She has no wheezes. She has no rales.   Abdominal: Soft. She exhibits no distension and no mass. There is no tenderness.   Lymphadenopathy:        Head (right side): No submental, no submandibular, no tonsillar, no preauricular, no posterior auricular and no occipital adenopathy present.        Head (left side): No submental, no submandibular, no tonsillar, no preauricular and no posterior auricular adenopathy present.      She has no cervical adenopathy.        Right cervical: No superficial cervical, no deep cervical and no posterior cervical adenopathy present.       Left cervical: No superficial cervical, no deep cervical and no posterior cervical adenopathy present.   Neurological: She is alert and oriented to person, place, and time.   Skin: Skin is warm and dry. No erythema.       Significant Labs: All pertinent labs within the past 24 hours have been reviewed.    Significant Imaging: I have reviewed all pertinent imaging results/findings within the past 24 hours.

## 2020-01-13 NOTE — SUBJECTIVE & OBJECTIVE
"Interval HPI:   Overnight events:  BG is within goal on current SQ insulin regimen.     Diet Cardiac [facility]; Consistent Carbohydrate  Diet NPO Except for: Medication, Sips with Medication    Eatin%  Nausea: No  Hypoglycemia and intervention: No  Fever: No  TPN and/or TF: No  If yes, type of TF/TPN and rate: None    BP (!) 158/73 (BP Location: Right arm, Patient Position: Sitting)   Pulse 84   Temp 98.5 °F (36.9 °C) (Oral)   Resp 14   Ht 5' 4" (1.626 m)   Wt 98.6 kg (217 lb 6 oz)   LMP 2015 (Exact Date)   SpO2 97%   Breastfeeding? No   BMI 37.31 kg/m²     Labs Reviewed and Include    Recent Labs   Lab 20  0424   *   CALCIUM 8.9   ALBUMIN 2.4*   PROT 6.3      K 4.7   CO2 18*      BUN 23*   CREATININE 1.0   ALKPHOS 84   ALT 20   AST 22   BILITOT 0.2     Lab Results   Component Value Date    WBC 6.60 2020    HGB 10.0 (L) 2020    HCT 32.8 (L) 2020     (H) 2020     2020     No results for input(s): TSH, FREET4 in the last 168 hours.  Lab Results   Component Value Date    HGBA1C 9.7 (H) 2020       Nutritional status:   Body mass index is 37.31 kg/m².  Lab Results   Component Value Date    ALBUMIN 2.4 (L) 2020    ALBUMIN 2.5 (L) 2020    ALBUMIN 2.6 (L) 2020     No results found for: PREALBUMIN    Estimated Creatinine Clearance: 74.3 mL/min (based on SCr of 1 mg/dL).    Accu-Checks  Recent Labs     01/10/20  2016 01/11/20  0750 20  1134 20  1638 20  2155 20  0749 20  1145 20  1655 20  2004 20  0838   POCTGLUCOSE 120* 161* 142* 150* 170* 166* 149* 159* 231* 171*       Current Medications and/or Treatments Impacting Glycemic Control  Immunotherapy:    Immunosuppressants     None        Steroids:   Hormones (From admission, onward)    None        Pressors:    Autonomic Drugs (From admission, onward)    None        Hyperglycemia/Diabetes Medications: "   Antihyperglycemics (From admission, onward)    Start     Stop Route Frequency Ordered    01/09/20 2100  insulin detemir U-100 pen 16 Units      -- SubQ Nightly 01/09/20 0931    01/09/20 1645  insulin aspart U-100 pen 4 Units      -- SubQ 3 times daily with meals 01/09/20 1319    01/04/20 0846  insulin aspart U-100 pen 0-5 Units      -- SubQ Before meals & nightly PRN 01/04/20 0746

## 2020-01-13 NOTE — HPI
Pt is a 53 y/o AAF with known medical medical issues of chronic combined systolic and diastolic HF (EF 35% Grade II DD), CAD S/P MI 2010, PAD S/P PTA with stents BLE 2015, IDDM (A1c 10.3%), DM who presented w/ N and V found to have NSTEMI.   Pt with CABG scheduled for tomorrow.  ID consulted as pt w/ hx of tooth abscess.    Pt denies any symptoms currently reports feeling great and ready for surgery.  She report previously having dental abscess on left side in 2016.  Reports currently she does not have abscess but in the past, she has developed a knot around her right jawline near her wisdom teeth that comes and goes.  Typically not requiring antibiotics for improvement.    Pt afebrile w/o white count. Blood cxs NGTD.

## 2020-01-13 NOTE — SUBJECTIVE & OBJECTIVE
Interval History: No acute events over night. Vitals and labs remain stable.  Pt states she believes she has a recurrence of an abscess of her R lower 2nd-3rd molar teeth. Denies pain or discomfort of teeth. Denies difficulty chewing. ID consulted and only recommended normal perioperative abx as there is no signs of active infection.    Review of Systems   Constitutional: Negative for chills, diaphoresis and fever.   Respiratory: Negative for chest tightness and shortness of breath.    Cardiovascular: Negative for chest pain and palpitations.   Gastrointestinal: Negative for abdominal distention and abdominal pain.   Genitourinary: Negative for dysuria.   Musculoskeletal: Negative for myalgias.   Skin: Negative for color change, pallor and rash.   Neurological: Negative for dizziness, weakness and headaches.   Hematological: Negative for adenopathy.   Psychiatric/Behavioral: Negative for confusion.   All other systems reviewed and are negative.    Objective:     Vital Signs (Most Recent):  Temp: 98.2 °F (36.8 °C) (01/13/20 1558)  Pulse: 82 (01/13/20 1558)  Resp: 18 (01/13/20 1558)  BP: 136/75 (01/13/20 1558)  SpO2: (!) 93 % (01/13/20 1558) Vital Signs (24h Range):  Temp:  [97.4 °F (36.3 °C)-98.5 °F (36.9 °C)] 98.2 °F (36.8 °C)  Pulse:  [77-91] 82  Resp:  [12-18] 18  SpO2:  [85 %-99 %] 93 %  BP: (132-158)/(59-76) 136/75     Weight: 98.6 kg (217 lb 6 oz)  Body mass index is 37.31 kg/m².    Intake/Output Summary (Last 24 hours) at 1/13/2020 1606  Last data filed at 1/12/2020 2300  Gross per 24 hour   Intake --   Output 500 ml   Net -500 ml      Physical Exam   Constitutional: She is oriented to person, place, and time. She appears well-developed and well-nourished. No distress.   HENT:   Head: Atraumatic.   Eyes: Pupils are equal, round, and reactive to light. Conjunctivae are normal. No scleral icterus.   Neck: Normal range of motion. Neck supple. No JVD present. No tracheal deviation present.   Cardiovascular: Normal  rate, regular rhythm and intact distal pulses. Exam reveals no gallop and no friction rub.   Murmur heard.  2/6 mid systolic murmur more pronounced over the LSB   Pulmonary/Chest: Effort normal and breath sounds normal. No respiratory distress. She has no wheezes.   Abdominal: Soft. Bowel sounds are normal. She exhibits no distension and no mass.   Musculoskeletal: She exhibits no edema.   Lymphadenopathy:     She has no cervical adenopathy.   Neurological: She is alert and oriented to person, place, and time.   Skin: Skin is warm and dry. She is not diaphoretic. No pallor.   Psychiatric: She has a normal mood and affect.   Nursing note and vitals reviewed.      Significant Labs: All pertinent labs within the past 24 hours have been reviewed.    Significant Imaging: I have reviewed and interpreted all pertinent imaging results/findings within the past 24 hours.

## 2020-01-13 NOTE — PROGRESS NOTES
[facility] Hospital Medicine  Progress Note    Patient Name: Suyapa Connelly  MRN: [id number]  Patient Class: IP- Inpatient   Admission Date: 1/2/2020  Length of Stay: 11 days  Attending Physician: Lulú Macias MD  Primary Care Provider: Erlinda Rahman NP    [facility] Medicine Team: [facility] HOSP MED 2 Karla Velarde MD    Subjective:     Principal Problem:Acute coronary syndrome        HPI:  Ms. Connelly is a 53 y/o AAF with known medical medical issues of chronic combined systolic and diastolic HF (EF 35% Grade II DD), CAD S/P MI 2010, Angiogram 12/18/19 mid LAD 90% stenosis, mid OM2 75% stenosis, and Ostial D1 70% stenosis LVDEP 25 mmHg, PAD S/P PTA with stents BLE 2015, IDDM (A1c 10.3%), HTN, HLD, GERD, Anxiety/ Depression. Patient present with a 2 day history of nausea and with multiple episodes of non-bilious non-bloody emesis and couple of episodes of watery non-bloody diarrhea. Patient reports initially feeling HARTMAN without associated chest pain/tightness for the past week before progressively worsening to the GI symptoms described. She reports having a 7 lb weight gain (dry weight unknown) and only able to walk less than 20-30 ft before feeling short of breath. She reports that 5-10 minute rest resolves her dyspnea and she never has shortness of breath at rest. She reports feeling similar symptoms to this when she was admitted to the hospital on 12/6/19 for ADHF. On Monday 12/30 she doubled her home torsemide dosage to help reduce the excess water weight but reports that it did not help much. When he GI symptoms developed on Tuesday, she reports being unable to tolerate anything P.O. and subsequently stopped taking all of her home medications including her home insulin. What prompted her to come into the hospital was a fluttering feeling in her chest associated with some diaphoresis. She reports not remembering the symptoms she felt when she was having her MI in 2010 but  at  "bedside reported that she had nausea and vomiting leading up to her MI and she collapsed and lost consciousness and then later once admitted to the hospital was told she had had a heart attack. Currently the patient reports feeling nauseated, diaphoretic, a mild headache, epigastric pain, with a vague "heart burn" like feeling in her chest. She denies light headedness, chest tightness, dyspnea, palpitations, dysuria, constipation, edema. She denies any recent sick contacts and fevers at home.    In the ED, , EKG showed sinus tachycardia without ST changes, 1st troponin 0.176, BNP 1,013, CXR - mild interstitial edema with pulmonary vascular congestion. She was seen by cardiology who suggested an ACS work up due to her recent angiogram showing vessel blockages and her being a diabetic with similar vague symptoms from last MI. The also suggested for CTS to be consulted to assess for CABG. Patient was admitted to hospital medicine Team 2 for further management and work up.      Overview/Hospital Course:  55 y/o AAF with known medical medical issues of chronic combined systolic and diastolic HF (EF 35% Grade II DD), CAD S/P MI 2010, Angiogram 12/18/19 mid LAD 90% stenosis, mid OM2 75% stenosis, and Ostial D1 70% stenosis LVDEP 25 mmHg, PAD S/P PTA with stents BLE 2015, IDDM (A1c 10.3%), HTN, HLD, GERD, Anxiety/ Depression. Worked up for NSTEMI/ACS due to Chest discomfort nonspecific ST changes on EKG and elevated troponin. Patient had an MI in 2002 with similar prodrome/symptoms leading up to the acute coronary event. Patient tentatively planned for CABG depending on the CTA results and her repeat HbA1C for 1/7/20, plan for surgery involving hybrid off pump coronary artery bypass (LIMA-LAD) with postoperative percutaneous coronary intervention to the LCx. Will hold Plavix in anticipation of surgery and will start heparin gtt after 48 hour per Cards recommendations. Patient developed 10/10 right sided non radiating " sharp chest pain 1/4/20 after eating. Ordered Troponin - 0.188 -->0.169, CXR - Interval improvement of opacity in the right lower lung zone not pneumothorax or widened midiastineum, EKG - No ST changes to rule out MI and gave subliqual nitro X3. Gave a GI cocktail to rule out indigestion. Patient felt relief after burping. Endocrinology following for optimal diabetes management in anticipation of surgery per CTS request. 1/5/2020: Patient denies chest pain. She developed ROSLYN on CKD which is most likely multifactorial. Urine electrolytes, U/s retroperitoneal unremarkable. Nephrology consulted with Cr trending up, which has normalized 1.2>3.4>4.2> 2.6> 1.2> 1.0.    Interval History: No acute events over night. Vitals and labs remain stable.  Pt states she believes she has a recurrence of an abscess of her R lower 2nd-3rd molar teeth. Denies pain or discomfort of teeth. Denies difficulty chewing. ID consulted and only recommended normal perioperative abx as there is no signs of active infection.    Review of Systems   Constitutional: Negative for chills, diaphoresis and fever.   Respiratory: Negative for chest tightness and shortness of breath.    Cardiovascular: Negative for chest pain and palpitations.   Gastrointestinal: Negative for abdominal distention and abdominal pain.   Genitourinary: Negative for dysuria.   Musculoskeletal: Negative for myalgias.   Skin: Negative for color change, pallor and rash.   Neurological: Negative for dizziness, weakness and headaches.   Hematological: Negative for adenopathy.   Psychiatric/Behavioral: Negative for confusion.   All other systems reviewed and are negative.    Objective:     Vital Signs (Most Recent):  Temp: 98.2 °F (36.8 °C) (01/13/20 1558)  Pulse: 82 (01/13/20 1558)  Resp: 18 (01/13/20 1558)  BP: 136/75 (01/13/20 1558)  SpO2: (!) 93 % (01/13/20 1558) Vital Signs (24h Range):  Temp:  [97.4 °F (36.3 °C)-98.5 °F (36.9 °C)] 98.2 °F (36.8 °C)  Pulse:  [77-91] 82  Resp:   [12-18] 18  SpO2:  [85 %-99 %] 93 %  BP: (132-158)/(59-76) 136/75     Weight: 98.6 kg (217 lb 6 oz)  Body mass index is 37.31 kg/m².    Intake/Output Summary (Last 24 hours) at 1/13/2020 1606  Last data filed at 1/12/2020 2300  Gross per 24 hour   Intake --   Output 500 ml   Net -500 ml      Physical Exam   Constitutional: She is oriented to person, place, and time. She appears well-developed and well-nourished. No distress.   HENT:   Head: Atraumatic.   Eyes: Pupils are equal, round, and reactive to light. Conjunctivae are normal. No scleral icterus.   Neck: Normal range of motion. Neck supple. No JVD present. No tracheal deviation present.   Cardiovascular: Normal rate, regular rhythm and intact distal pulses. Exam reveals no gallop and no friction rub.   Murmur heard.  2/6 mid systolic murmur more pronounced over the LSB   Pulmonary/Chest: Effort normal and breath sounds normal. No respiratory distress. She has no wheezes.   Abdominal: Soft. Bowel sounds are normal. She exhibits no distension and no mass.   Musculoskeletal: She exhibits no edema.   Lymphadenopathy:     She has no cervical adenopathy.   Neurological: She is alert and oriented to person, place, and time.   Skin: Skin is warm and dry. She is not diaphoretic. No pallor.   Psychiatric: She has a normal mood and affect.   Nursing note and vitals reviewed.      Significant Labs: All pertinent labs within the past 24 hours have been reviewed.    Significant Imaging: I have reviewed and interpreted all pertinent imaging results/findings within the past 24 hours.      Assessment/Plan:      * Acute coronary syndrome  53 y/o AAF with known medical medical issues of chronic combined systolic and diastolic HF (EF 35% Grade II DD), CAD S/P MI 2010, Angiogram 12/18/19 mid LAD 90% stenosis, mid OM2 75% stenosis, and Ostial D1 70% stenosis LVDEP 25 mmHg, PAD S/P PTA with stents BLE 2015, IDDM (A1c 10.3%), HTN, HLD presenting with HARTMAN and nausea/ vomiting and  ""heart burn" like chest discomfort similar in characteristic for her last MI in 2010.     EKG - Sinus tachycardia without ST changes  1st Troponin - 0.176  BNP - 1,013    Seen by Cardiology in the ED who suggested ACS work up and CTS consult due to multi-vessel coronary disease  TTE: minimal changes since last echo. CVP 8 mmHg       PLAN:  -- Heparin gtt ACS protocol - for 48 hours stop date 1/4/20  --  mg in ED with 81 mg daily and Plavix load 300 mg X1 Holding Plavix in anticipation of CABG tentatively 1/7/20   -- Lasix 80mg IV BID - d/c   -- EKG PRN  -- continuous cardiac monitoring  -- CTS Consulted - tentatively planned for CABG on 1/14,  involving hybrid off pump coronary artery bypass (LIMA-LAD) with postoperative percutaneous coronary intervention to the LCx -   -- Famotidine IV 20 mg BID - for prevention of GI Bleeds in ACS work up  -- High intensity atorvastatin 80 mg QHS  - c/w ASA, full potency statin.  - ACEI  held due to ROSLYN and borderline low BP  - Plavix stopped per CTS recommendations     Weakness  PT/OT      Contrast dye induced nephropathy  *improving  PLAN:  -- current Cr 1. Increased to 3.4> 4.2> 2.6> 1.2> 1.0  -- gave 500 cc bolus NS 1/4 - Judicious due to EF 35%  -- Avoid nephrotoxic medications if able  -- Hold furosemide, lisinopril  - UAR and retroperitoneal US unremarkable. Spun urine showing granular casts  - Nephrology on board      CAD (coronary artery disease)  CAD S/P MI 2010, Angiogram 12/18/19 mid LAD 90% stenosis, mid OM2 75% stenosis, and Ostial D1 70% stenosis LVDEP 25 mmHg    PLAN:  - CABG tentatively planned for Tuesday 1/14/2020  - c/w ASA, full potency statin   - ACEI  held due to ROSLYN and borderline low BP  - Plavix stopped per CTS recommendations     Type 2 diabetes mellitus with hyperglycemia, with long-term current use of insulin  A1c 12/2019 - 10.3% 1/3/2020 - 9.7%    BG on admission was 347. Patient reports not taking her insulin for 2 days due to low P.O. " Intake  Home regimen: Aspart 9 U WM + Lantus 50 U QHS    PLAN:  -- Per Endocrine recommendation; appreciate recs:  Levemir 16 units daily and novolog 4 units with meals with meals   -- POC BG 4 X daily   -- LDSSI PRN  -- Goal -180      Acute on chronic combined systolic (congestive) and diastolic (congestive) heart failure  TTE 12/6/19: Moderately decreased left ventricular systolic function. The estimated ejection fraction is 35%. Grade II (moderate) left ventricular diastolic dysfunction consistent with pseudonormalization. Moderate global hypokinetic wall motion. Normal right ventricular systolic function. Severe left atrial enlargement. Mild-to-moderate mitral regurgitation. Mild to moderate tricuspid regurgitation. The estimated PA systolic pressure is 49 mm Hg.Pulmonary hypertension present. Normal central venous pressure (3 mm Hg).    CXR 1/2/20: Mild interstitial edema with pulmonary vasculature congestion    TTE 1/3/20: Mild left ventricular enlargement. Moderately decreased left ventricular systolic function. The estimated ejection fraction is 35%. Grade II (moderate) left ventricular diastolic dysfunction consistent with pseudonormalization. Septal wall has abnormal motion. Local segmental wall motion abnormalities. Moderate left atrial enlargement. Normal right ventricular systolic function. Mild mitral regurgitation. Mild tricuspid regurgitation. Mild pulmonic regurgitation. Intermediate central venous pressure (8 mm Hg). The estimated PA systolic pressure is 46 mm Hg. Very small aneurysm in inferoapex visualized on image 47. The quantitatively dervived ejection fraction is 34%. Mild aortic regurgitation. Pulmonary hypertension present.    PLAN:  -- Carvedilol restarted at half dose (6.25mg BID)  -- Hold ACEi in setting of soft BP and contrast induced ROSLYN  -- IV lasix 80 mg QD - Held in the setting of ROSLYN  -- Strict I/Os  -- Daily standing weights  -- Low sodium (<2g) diet with 1500 cc fluid  restriction        Mixed hyperlipidemia  Home med:  Atorvastatin 80 mg    Lipid panel 12/16/19: T cholesterol 244, HDL 46, , Triglyceride 135  Lipid panel 1/3/2020: T cholesterol 246 HDL 41 ; Triglycerides 115    PLAN:  -- Continue home statin  -- goal LDL <70     Non-intractable vomiting with nausea  On admission patient having two days of non-bloody non-bilious emesis with poor P.O. Intake    PLAN: - Improved  -- P.O. Phenergan 25 mg PRN  -- EKG daily to assess for QTc. 1/3/20 EKG QTc - 479  1/4/20 EKG Sinus rhythm with marked sinus arrythmia  Minimal voltage criteria for LVH, QT prolongation at 464      Essential hypertension  Home: Lisinopril 40 mg QD, Coreg 12.5 mg BID    PLAN:  -- Hold Lisinopril while BP is soft and patient has contrast induced ROSLYN  -- Goal BP <140/90      Peripheral vascular disease  S/P PTA with stents BLE 2015    CTA Ab/pelvis with run off:  1. Diffuse atherosclerotic disease resulting in stenosis of the origins of the celiac artery, SMA, bilateral accessory renal arteries.  2. There is diffuse atherosclerotic disease in the bilateral lower extremities with bilateral SFA-popliteal artery stent occlusion.  The popliteal arteries reconstitute distally with collateral vessels.  3. Leiomyomatous uterus.      ROSLYN (acute kidney injury)          VTE Risk Mitigation (From admission, onward)         Ordered     Place sequential compression device  Until discontinued      01/02/20 1518     IP VTE HIGH RISK PATIENT  Once      01/02/20 1518                      Karla Velarde MD  Department of Hospital Medicine   [facility]

## 2020-01-13 NOTE — ASSESSMENT & PLAN NOTE
"BG goal 140 - 180     BG is within goal and reasonably controlled on current SQ insulin MDI regimen.     Continue Levemir to 16 units Nightly.   Continue Novolog to 4 units TIDWM.   Low Dose SQ Insulin Correction Scale.  BG Monitoring AC/HS     ** Please call Endocrine for any BG related issues **  ** Please notify Endocrine for any change and/or advance in diet**    Discharge Planning:     TBD. Please notify endocrinology prior to discharge. Patient will need adjustments to home insulin regimen.     Tentative:     Levemir 20 units HS.     Novolog 7 units TIDWM.     Low Dose SQ Insulin Correction Scale.    BG Monitoring AC/HS     PRN Insurance preferred diabetes testing supplies    PRN Ultra-Fine Junie Pen Needles 4mm x 32 G (5/32" x 0.23mm).     Patient request outpatient follow-up with [facility] endo clinic for continued DM management.        "

## 2020-01-13 NOTE — PLAN OF CARE
Plan of care discussed with patient. BG monitored, WNL. Cardiac monitor in place. ABT administered per order, pt tolerated well. Patient is free of fall/trauma/injury. Denies CP, SOB, or pain/discomfort. All questions addressed. Will continue to monitor

## 2020-01-14 LAB
ALBUMIN SERPL BCP-MCNC: 2.6 G/DL (ref 3.5–5.2)
ALBUMIN SERPL BCP-MCNC: 2.6 G/DL (ref 3.5–5.2)
ALLENS TEST: ABNORMAL
ALLENS TEST: NORMAL
ALP SERPL-CCNC: 76 U/L (ref 55–135)
ALP SERPL-CCNC: 96 U/L (ref 55–135)
ALT SERPL W/O P-5'-P-CCNC: 16 U/L (ref 10–44)
ALT SERPL W/O P-5'-P-CCNC: 21 U/L (ref 10–44)
ANION GAP SERPL CALC-SCNC: 7 MMOL/L (ref 8–16)
ANION GAP SERPL CALC-SCNC: 8 MMOL/L (ref 8–16)
APTT BLDCRRT: 25.7 SEC (ref 21–32)
AST SERPL-CCNC: 16 U/L (ref 10–40)
AST SERPL-CCNC: 20 U/L (ref 10–40)
BASOPHILS # BLD AUTO: 0.05 K/UL (ref 0–0.2)
BASOPHILS # BLD AUTO: 0.06 K/UL (ref 0–0.2)
BASOPHILS # BLD AUTO: 0.09 K/UL (ref 0–0.2)
BASOPHILS NFR BLD: 0.4 % (ref 0–1.9)
BASOPHILS NFR BLD: 0.5 % (ref 0–1.9)
BASOPHILS NFR BLD: 0.6 % (ref 0–1.9)
BILIRUB DIRECT SERPL-MCNC: 0.3 MG/DL (ref 0.1–0.3)
BILIRUB SERPL-MCNC: 0.3 MG/DL (ref 0.1–1)
BILIRUB SERPL-MCNC: 0.6 MG/DL (ref 0.1–1)
BUN SERPL-MCNC: 20 MG/DL (ref 6–20)
BUN SERPL-MCNC: 26 MG/DL (ref 6–20)
CALCIUM SERPL-MCNC: 8.4 MG/DL (ref 8.7–10.5)
CALCIUM SERPL-MCNC: 9 MG/DL (ref 8.7–10.5)
CHLORIDE SERPL-SCNC: 106 MMOL/L (ref 95–110)
CHLORIDE SERPL-SCNC: 109 MMOL/L (ref 95–110)
CO2 SERPL-SCNC: 20 MMOL/L (ref 23–29)
CO2 SERPL-SCNC: 22 MMOL/L (ref 23–29)
CREAT SERPL-MCNC: 0.9 MG/DL (ref 0.5–1.4)
CREAT SERPL-MCNC: 1.1 MG/DL (ref 0.5–1.4)
DELSYS: ABNORMAL
DIFFERENTIAL METHOD: ABNORMAL
EOSINOPHIL # BLD AUTO: 0.1 K/UL (ref 0–0.5)
EOSINOPHIL # BLD AUTO: 0.2 K/UL (ref 0–0.5)
EOSINOPHIL # BLD AUTO: 0.2 K/UL (ref 0–0.5)
EOSINOPHIL NFR BLD: 0.6 % (ref 0–8)
EOSINOPHIL NFR BLD: 1.1 % (ref 0–8)
EOSINOPHIL NFR BLD: 2.7 % (ref 0–8)
ERYTHROCYTE [DISTWIDTH] IN BLOOD BY AUTOMATED COUNT: 13 % (ref 11.5–14.5)
EST. GFR  (AFRICAN AMERICAN): >60 ML/MIN/1.73 M^2
EST. GFR  (AFRICAN AMERICAN): >60 ML/MIN/1.73 M^2
EST. GFR  (NON AFRICAN AMERICAN): 57.5 ML/MIN/1.73 M^2
EST. GFR  (NON AFRICAN AMERICAN): >60 ML/MIN/1.73 M^2
FIO2: 100
FLOW: 15
FLOW: 15
FLOW: 45
GLUCOSE SERPL-MCNC: 160 MG/DL (ref 70–110)
GLUCOSE SERPL-MCNC: 172 MG/DL (ref 70–110)
GLUCOSE SERPL-MCNC: 174 MG/DL (ref 70–110)
GLUCOSE SERPL-MCNC: 200 MG/DL (ref 70–110)
GLUCOSE SERPL-MCNC: 211 MG/DL (ref 70–110)
HCO3 UR-SCNC: 18 MMOL/L (ref 24–28)
HCO3 UR-SCNC: 19.3 MMOL/L (ref 24–28)
HCO3 UR-SCNC: 19.6 MMOL/L (ref 24–28)
HCO3 UR-SCNC: 19.8 MMOL/L (ref 24–28)
HCO3 UR-SCNC: 19.9 MMOL/L (ref 24–28)
HCO3 UR-SCNC: 21.1 MMOL/L (ref 24–28)
HCO3 UR-SCNC: 21.3 MMOL/L (ref 24–28)
HCO3 UR-SCNC: 21.3 MMOL/L (ref 24–28)
HCO3 UR-SCNC: 21.4 MMOL/L (ref 24–28)
HCO3 UR-SCNC: 22.1 MMOL/L (ref 24–28)
HCO3 UR-SCNC: 22.9 MMOL/L (ref 24–28)
HCT VFR BLD AUTO: 27.4 % (ref 37–48.5)
HCT VFR BLD AUTO: 28.1 % (ref 37–48.5)
HCT VFR BLD AUTO: 33 % (ref 37–48.5)
HCT VFR BLD CALC: 23 %PCV (ref 36–54)
HCT VFR BLD CALC: 23 %PCV (ref 36–54)
HCT VFR BLD CALC: 24 %PCV (ref 36–54)
HCT VFR BLD CALC: 25 %PCV (ref 36–54)
HCT VFR BLD CALC: 25 %PCV (ref 36–54)
HCT VFR BLD CALC: 26 %PCV (ref 36–54)
HCT VFR BLD CALC: 28 %PCV (ref 36–54)
HCT VFR BLD CALC: 28 %PCV (ref 36–54)
HGB BLD-MCNC: 10.4 G/DL (ref 12–16)
HGB BLD-MCNC: 8.7 G/DL (ref 12–16)
HGB BLD-MCNC: 8.7 G/DL (ref 12–16)
IMM GRANULOCYTES # BLD AUTO: 0.03 K/UL (ref 0–0.04)
IMM GRANULOCYTES # BLD AUTO: 0.07 K/UL (ref 0–0.04)
IMM GRANULOCYTES # BLD AUTO: 0.12 K/UL (ref 0–0.04)
IMM GRANULOCYTES NFR BLD AUTO: 0.4 % (ref 0–0.5)
IMM GRANULOCYTES NFR BLD AUTO: 0.4 % (ref 0–0.5)
IMM GRANULOCYTES NFR BLD AUTO: 0.9 % (ref 0–0.5)
INR PPP: 1 (ref 0.8–1.2)
LDH SERPL L TO P-CCNC: 0.44 MMOL/L (ref 0.36–1.25)
LDH SERPL L TO P-CCNC: 0.48 MMOL/L (ref 0.36–1.25)
LDH SERPL L TO P-CCNC: 0.59 MMOL/L (ref 0.36–1.25)
LDH SERPL L TO P-CCNC: 0.9 MMOL/L (ref 0.36–1.25)
LDH SERPL L TO P-CCNC: 1.3 MMOL/L (ref 0.36–1.25)
LDH SERPL L TO P-CCNC: 1.39 MMOL/L (ref 0.36–1.25)
LDH SERPL L TO P-CCNC: 1.41 MMOL/L (ref 0.36–1.25)
LYMPHOCYTES # BLD AUTO: 2 K/UL (ref 1–4.8)
LYMPHOCYTES # BLD AUTO: 2.1 K/UL (ref 1–4.8)
LYMPHOCYTES # BLD AUTO: 2.3 K/UL (ref 1–4.8)
LYMPHOCYTES NFR BLD: 12.3 % (ref 18–48)
LYMPHOCYTES NFR BLD: 16.7 % (ref 18–48)
LYMPHOCYTES NFR BLD: 25.5 % (ref 18–48)
MAGNESIUM SERPL-MCNC: 2 MG/DL (ref 1.6–2.6)
MCH RBC QN AUTO: 30.9 PG (ref 27–31)
MCH RBC QN AUTO: 31 PG (ref 27–31)
MCH RBC QN AUTO: 31.3 PG (ref 27–31)
MCHC RBC AUTO-ENTMCNC: 31 G/DL (ref 32–36)
MCHC RBC AUTO-ENTMCNC: 31.5 G/DL (ref 32–36)
MCHC RBC AUTO-ENTMCNC: 31.8 G/DL (ref 32–36)
MCV RBC AUTO: 100 FL (ref 82–98)
MCV RBC AUTO: 99 FL (ref 82–98)
MCV RBC AUTO: 99 FL (ref 82–98)
MODE: ABNORMAL
MONOCYTES # BLD AUTO: 0.7 K/UL (ref 0.3–1)
MONOCYTES # BLD AUTO: 1 K/UL (ref 0.3–1)
MONOCYTES # BLD AUTO: 1.6 K/UL (ref 0.3–1)
MONOCYTES NFR BLD: 7.4 % (ref 4–15)
MONOCYTES NFR BLD: 9.4 % (ref 4–15)
MONOCYTES NFR BLD: 9.5 % (ref 4–15)
NEUTROPHILS # BLD AUTO: 10.1 K/UL (ref 1.8–7.7)
NEUTROPHILS # BLD AUTO: 13.3 K/UL (ref 1.8–7.7)
NEUTROPHILS # BLD AUTO: 4.8 K/UL (ref 1.8–7.7)
NEUTROPHILS NFR BLD: 61.3 % (ref 38–73)
NEUTROPHILS NFR BLD: 73.5 % (ref 38–73)
NEUTROPHILS NFR BLD: 76.8 % (ref 38–73)
NRBC BLD-RTO: 0 /100 WBC
PCO2 BLDA: 37.6 MMHG (ref 35–45)
PCO2 BLDA: 42.7 MMHG (ref 35–45)
PCO2 BLDA: 42.8 MMHG (ref 35–45)
PCO2 BLDA: 43.2 MMHG (ref 35–45)
PCO2 BLDA: 43.3 MMHG (ref 35–45)
PCO2 BLDA: 43.8 MMHG (ref 35–45)
PCO2 BLDA: 45.9 MMHG (ref 35–45)
PCO2 BLDA: 46.4 MMHG (ref 35–45)
PCO2 BLDA: 47.6 MMHG (ref 35–45)
PCO2 BLDA: 49.2 MMHG (ref 35–45)
PCO2 BLDA: 49.2 MMHG (ref 35–45)
PH SMN: 7.21 [PH] (ref 7.35–7.45)
PH SMN: 7.22 [PH] (ref 7.35–7.45)
PH SMN: 7.23 [PH] (ref 7.35–7.45)
PH SMN: 7.24 [PH] (ref 7.35–7.45)
PH SMN: 7.25 [PH] (ref 7.35–7.45)
PH SMN: 7.27 [PH] (ref 7.35–7.45)
PH SMN: 7.27 [PH] (ref 7.35–7.45)
PH SMN: 7.3 [PH] (ref 7.35–7.45)
PH SMN: 7.3 [PH] (ref 7.35–7.45)
PH SMN: 7.32 [PH] (ref 7.35–7.45)
PH SMN: 7.36 [PH] (ref 7.35–7.45)
PHOSPHATE SERPL-MCNC: 3.6 MG/DL (ref 2.7–4.5)
PLATELET # BLD AUTO: 257 K/UL (ref 150–350)
PLATELET # BLD AUTO: 278 K/UL (ref 150–350)
PLATELET # BLD AUTO: 298 K/UL (ref 150–350)
PMV BLD AUTO: 10.2 FL (ref 9.2–12.9)
PMV BLD AUTO: 10.5 FL (ref 9.2–12.9)
PMV BLD AUTO: 10.5 FL (ref 9.2–12.9)
PO2 BLDA: 101 MMHG (ref 80–100)
PO2 BLDA: 119 MMHG (ref 80–100)
PO2 BLDA: 120 MMHG (ref 80–100)
PO2 BLDA: 159 MMHG (ref 80–100)
PO2 BLDA: 238 MMHG (ref 80–100)
PO2 BLDA: 245 MMHG (ref 80–100)
PO2 BLDA: 272 MMHG (ref 80–100)
PO2 BLDA: 56 MMHG (ref 80–100)
PO2 BLDA: 73 MMHG (ref 80–100)
PO2 BLDA: 81 MMHG (ref 80–100)
PO2 BLDA: 94 MMHG (ref 80–100)
POC BE: -3 MMOL/L
POC BE: -4 MMOL/L
POC BE: -4 MMOL/L
POC BE: -5 MMOL/L
POC BE: -6 MMOL/L
POC BE: -6 MMOL/L
POC BE: -7 MMOL/L
POC BE: -8 MMOL/L
POC BE: -9 MMOL/L
POC IONIZED CALCIUM: 1.11 MMOL/L (ref 1.06–1.42)
POC IONIZED CALCIUM: 1.13 MMOL/L (ref 1.06–1.42)
POC IONIZED CALCIUM: 1.15 MMOL/L (ref 1.06–1.42)
POC IONIZED CALCIUM: 1.22 MMOL/L (ref 1.06–1.42)
POC IONIZED CALCIUM: 1.23 MMOL/L (ref 1.06–1.42)
POC IONIZED CALCIUM: 1.25 MMOL/L (ref 1.06–1.42)
POC IONIZED CALCIUM: 1.28 MMOL/L (ref 1.06–1.42)
POC IONIZED CALCIUM: 1.29 MMOL/L (ref 1.06–1.42)
POC SATURATED O2: 100 % (ref 95–100)
POC SATURATED O2: 82 % (ref 95–100)
POC SATURATED O2: 92 % (ref 95–100)
POC SATURATED O2: 94 % (ref 95–100)
POC SATURATED O2: 96 % (ref 95–100)
POC SATURATED O2: 96 % (ref 95–100)
POC SATURATED O2: 98 % (ref 95–100)
POC SATURATED O2: 98 % (ref 95–100)
POC SATURATED O2: 99 % (ref 95–100)
POC TCO2: 19 MMOL/L (ref 23–27)
POC TCO2: 21 MMOL/L (ref 23–27)
POC TCO2: 22 MMOL/L (ref 23–27)
POC TCO2: 23 MMOL/L (ref 23–27)
POC TCO2: 24 MMOL/L (ref 23–27)
POCT GLUCOSE: 175 MG/DL (ref 70–110)
POCT GLUCOSE: 201 MG/DL (ref 70–110)
POCT GLUCOSE: 215 MG/DL (ref 70–110)
POCT GLUCOSE: 231 MG/DL (ref 70–110)
POCT GLUCOSE: 233 MG/DL (ref 70–110)
POCT GLUCOSE: 238 MG/DL (ref 70–110)
POCT GLUCOSE: 243 MG/DL (ref 70–110)
POCT GLUCOSE: 261 MG/DL (ref 70–110)
POCT GLUCOSE: 300 MG/DL (ref 70–110)
POCT GLUCOSE: 302 MG/DL (ref 70–110)
POCT GLUCOSE: 335 MG/DL (ref 70–110)
POTASSIUM BLD-SCNC: 4.4 MMOL/L (ref 3.5–5.1)
POTASSIUM BLD-SCNC: 4.7 MMOL/L (ref 3.5–5.1)
POTASSIUM BLD-SCNC: 4.8 MMOL/L (ref 3.5–5.1)
POTASSIUM BLD-SCNC: 4.8 MMOL/L (ref 3.5–5.1)
POTASSIUM BLD-SCNC: 4.9 MMOL/L (ref 3.5–5.1)
POTASSIUM BLD-SCNC: 4.9 MMOL/L (ref 3.5–5.1)
POTASSIUM BLD-SCNC: 5.1 MMOL/L (ref 3.5–5.1)
POTASSIUM BLD-SCNC: 5.2 MMOL/L (ref 3.5–5.1)
POTASSIUM BLD-SCNC: 5.3 MMOL/L (ref 3.5–5.1)
POTASSIUM BLD-SCNC: 5.4 MMOL/L (ref 3.5–5.1)
POTASSIUM SERPL-SCNC: 4.6 MMOL/L (ref 3.5–5.1)
POTASSIUM SERPL-SCNC: 4.6 MMOL/L (ref 3.5–5.1)
POTASSIUM SERPL-SCNC: 4.7 MMOL/L (ref 3.5–5.1)
PROT SERPL-MCNC: 5.9 G/DL (ref 6–8.4)
PROT SERPL-MCNC: 7 G/DL (ref 6–8.4)
PROTHROMBIN TIME: 10.6 SEC (ref 9–12.5)
RBC # BLD AUTO: 2.78 M/UL (ref 4–5.4)
RBC # BLD AUTO: 2.82 M/UL (ref 4–5.4)
RBC # BLD AUTO: 3.35 M/UL (ref 4–5.4)
SAMPLE: ABNORMAL
SAMPLE: NORMAL
SITE: ABNORMAL
SITE: NORMAL
SODIUM BLD-SCNC: 136 MMOL/L (ref 136–145)
SODIUM BLD-SCNC: 137 MMOL/L (ref 136–145)
SODIUM BLD-SCNC: 138 MMOL/L (ref 136–145)
SODIUM BLD-SCNC: 138 MMOL/L (ref 136–145)
SODIUM BLD-SCNC: 140 MMOL/L (ref 136–145)
SODIUM BLD-SCNC: 140 MMOL/L (ref 136–145)
SODIUM SERPL-SCNC: 136 MMOL/L (ref 136–145)
SODIUM SERPL-SCNC: 136 MMOL/L (ref 136–145)
SP02: 95
SP02: 95
SP02: 96
SP02: 96
SP02: 97
SP02: 97
WBC # BLD AUTO: 13.72 K/UL (ref 3.9–12.7)
WBC # BLD AUTO: 17.28 K/UL (ref 3.9–12.7)
WBC # BLD AUTO: 7.79 K/UL (ref 3.9–12.7)

## 2020-01-14 PROCEDURE — 27201423 OPTIME MED/SURG SUP & DEVICES STERILE SUPPLY: Performed by: THORACIC SURGERY (CARDIOTHORACIC VASCULAR SURGERY)

## 2020-01-14 PROCEDURE — P9045 ALBUMIN (HUMAN), 5%, 250 ML: HCPCS | Mod: JG | Performed by: STUDENT IN AN ORGANIZED HEALTH CARE EDUCATION/TRAINING PROGRAM

## 2020-01-14 PROCEDURE — 63600175 PHARM REV CODE 636 W HCPCS: Performed by: STUDENT IN AN ORGANIZED HEALTH CARE EDUCATION/TRAINING PROGRAM

## 2020-01-14 PROCEDURE — 33533 CABG ARTERIAL SINGLE: CPT | Mod: ,,, | Performed by: THORACIC SURGERY (CARDIOTHORACIC VASCULAR SURGERY)

## 2020-01-14 PROCEDURE — 84100 ASSAY OF PHOSPHORUS: CPT

## 2020-01-14 PROCEDURE — 33533 PR CABG, ARTERIAL, SINGLE: ICD-10-PCS | Mod: ,,, | Performed by: THORACIC SURGERY (CARDIOTHORACIC VASCULAR SURGERY)

## 2020-01-14 PROCEDURE — 36000713 HC OR TIME LEV V EA ADD 15 MIN: Performed by: THORACIC SURGERY (CARDIOTHORACIC VASCULAR SURGERY)

## 2020-01-14 PROCEDURE — 85014 HEMATOCRIT: CPT

## 2020-01-14 PROCEDURE — 27100092 HC HIGH FLOW DELIVERY CANNULA

## 2020-01-14 PROCEDURE — 84295 ASSAY OF SERUM SODIUM: CPT

## 2020-01-14 PROCEDURE — 76937 PR  US GUIDE, VASCULAR ACCESS: ICD-10-PCS | Mod: 26,,, | Performed by: ANESTHESIOLOGY

## 2020-01-14 PROCEDURE — 76937 US GUIDE VASCULAR ACCESS: CPT | Mod: 26,,, | Performed by: ANESTHESIOLOGY

## 2020-01-14 PROCEDURE — 82962 GLUCOSE BLOOD TEST: CPT | Performed by: THORACIC SURGERY (CARDIOTHORACIC VASCULAR SURGERY)

## 2020-01-14 PROCEDURE — 99900035 HC TECH TIME PER 15 MIN (STAT)

## 2020-01-14 PROCEDURE — 84132 ASSAY OF SERUM POTASSIUM: CPT

## 2020-01-14 PROCEDURE — 36415 COLL VENOUS BLD VENIPUNCTURE: CPT

## 2020-01-14 PROCEDURE — 25000003 PHARM REV CODE 250: Performed by: STUDENT IN AN ORGANIZED HEALTH CARE EDUCATION/TRAINING PROGRAM

## 2020-01-14 PROCEDURE — 93010 EKG 12-LEAD: ICD-10-PCS | Mod: ,,, | Performed by: INTERNAL MEDICINE

## 2020-01-14 PROCEDURE — D9220A PRA ANESTHESIA: ICD-10-PCS | Mod: ,,, | Performed by: ANESTHESIOLOGY

## 2020-01-14 PROCEDURE — 93010 ELECTROCARDIOGRAM REPORT: CPT | Mod: ,,, | Performed by: INTERNAL MEDICINE

## 2020-01-14 PROCEDURE — 37000008 HC ANESTHESIA 1ST 15 MINUTES: Performed by: THORACIC SURGERY (CARDIOTHORACIC VASCULAR SURGERY)

## 2020-01-14 PROCEDURE — 25000003 PHARM REV CODE 250: Performed by: SURGERY

## 2020-01-14 PROCEDURE — 82803 BLOOD GASES ANY COMBINATION: CPT

## 2020-01-14 PROCEDURE — 37799 UNLISTED PX VASCULAR SURGERY: CPT

## 2020-01-14 PROCEDURE — 80076 HEPATIC FUNCTION PANEL: CPT

## 2020-01-14 PROCEDURE — 82330 ASSAY OF CALCIUM: CPT

## 2020-01-14 PROCEDURE — 85730 THROMBOPLASTIN TIME PARTIAL: CPT

## 2020-01-14 PROCEDURE — 36620 ARTERIAL: ICD-10-PCS | Mod: 59,,, | Performed by: ANESTHESIOLOGY

## 2020-01-14 PROCEDURE — 36556 PR INSERT NON-TUNNEL CV CATH 5+ YRS OLD: ICD-10-PCS | Mod: 59,,, | Performed by: ANESTHESIOLOGY

## 2020-01-14 PROCEDURE — 20000000 HC ICU ROOM

## 2020-01-14 PROCEDURE — 37000009 HC ANESTHESIA EA ADD 15 MINS: Performed by: THORACIC SURGERY (CARDIOTHORACIC VASCULAR SURGERY)

## 2020-01-14 PROCEDURE — 63600175 PHARM REV CODE 636 W HCPCS: Performed by: NURSE PRACTITIONER

## 2020-01-14 PROCEDURE — 99233 SBSQ HOSP IP/OBS HIGH 50: CPT | Mod: ,,, | Performed by: SURGERY

## 2020-01-14 PROCEDURE — 99233 SBSQ HOSP IP/OBS HIGH 50: CPT | Mod: ,,, | Performed by: NURSE PRACTITIONER

## 2020-01-14 PROCEDURE — 25000003 PHARM REV CODE 250: Performed by: THORACIC SURGERY (CARDIOTHORACIC VASCULAR SURGERY)

## 2020-01-14 PROCEDURE — 93005 ELECTROCARDIOGRAM TRACING: CPT

## 2020-01-14 PROCEDURE — 63600175 PHARM REV CODE 636 W HCPCS: Mod: JG | Performed by: STUDENT IN AN ORGANIZED HEALTH CARE EDUCATION/TRAINING PROGRAM

## 2020-01-14 PROCEDURE — 85520 HEPARIN ASSAY: CPT

## 2020-01-14 PROCEDURE — 27000445 HC TEMPORARY PACEMAKER LEADS

## 2020-01-14 PROCEDURE — 80053 COMPREHEN METABOLIC PANEL: CPT

## 2020-01-14 PROCEDURE — 83605 ASSAY OF LACTIC ACID: CPT

## 2020-01-14 PROCEDURE — 27100171 HC OXYGEN HIGH FLOW UP TO 24 HOURS

## 2020-01-14 PROCEDURE — 97802 MEDICAL NUTRITION INDIV IN: CPT

## 2020-01-14 PROCEDURE — 63600175 PHARM REV CODE 636 W HCPCS: Performed by: SURGERY

## 2020-01-14 PROCEDURE — 93312 ECHO TRANSESOPHAGEAL: CPT | Mod: 26,59,, | Performed by: ANESTHESIOLOGY

## 2020-01-14 PROCEDURE — 94761 N-INVAS EAR/PLS OXIMETRY MLT: CPT

## 2020-01-14 PROCEDURE — 63600175 PHARM REV CODE 636 W HCPCS: Performed by: THORACIC SURGERY (CARDIOTHORACIC VASCULAR SURGERY)

## 2020-01-14 PROCEDURE — 27000191 HC C-V MONITORING

## 2020-01-14 PROCEDURE — 27100088 HC CELL SAVER

## 2020-01-14 PROCEDURE — 85025 COMPLETE CBC W/AUTO DIFF WBC: CPT

## 2020-01-14 PROCEDURE — P9045 ALBUMIN (HUMAN), 5%, 250 ML: HCPCS | Mod: JG

## 2020-01-14 PROCEDURE — 36000712 HC OR TIME LEV V 1ST 15 MIN: Performed by: THORACIC SURGERY (CARDIOTHORACIC VASCULAR SURGERY)

## 2020-01-14 PROCEDURE — 82800 BLOOD PH: CPT

## 2020-01-14 PROCEDURE — 93312 TEE: ICD-10-PCS | Mod: 26,59,, | Performed by: ANESTHESIOLOGY

## 2020-01-14 PROCEDURE — 36556 INSERT NON-TUNNEL CV CATH: CPT | Mod: 59,,, | Performed by: ANESTHESIOLOGY

## 2020-01-14 PROCEDURE — 63600175 PHARM REV CODE 636 W HCPCS: Mod: JG | Performed by: SURGERY

## 2020-01-14 PROCEDURE — C1729 CATH, DRAINAGE: HCPCS | Performed by: THORACIC SURGERY (CARDIOTHORACIC VASCULAR SURGERY)

## 2020-01-14 PROCEDURE — 27000239 HC STAND-BY BYPASS PUMP

## 2020-01-14 PROCEDURE — 25000003 PHARM REV CODE 250: Performed by: NURSE PRACTITIONER

## 2020-01-14 PROCEDURE — 25000003 PHARM REV CODE 250: Performed by: INTERNAL MEDICINE

## 2020-01-14 PROCEDURE — 99233 PR SUBSEQUENT HOSPITAL CARE,LEVL III: ICD-10-PCS | Mod: ,,, | Performed by: SURGERY

## 2020-01-14 PROCEDURE — P9045 ALBUMIN (HUMAN), 5%, 250 ML: HCPCS | Mod: JG | Performed by: SURGERY

## 2020-01-14 PROCEDURE — D9220A PRA ANESTHESIA: Mod: ,,, | Performed by: ANESTHESIOLOGY

## 2020-01-14 PROCEDURE — 27000221 HC OXYGEN, UP TO 24 HOURS

## 2020-01-14 PROCEDURE — 36620 INSERTION CATHETER ARTERY: CPT | Mod: 59,,, | Performed by: ANESTHESIOLOGY

## 2020-01-14 PROCEDURE — 85610 PROTHROMBIN TIME: CPT

## 2020-01-14 PROCEDURE — 80048 BASIC METABOLIC PNL TOTAL CA: CPT

## 2020-01-14 PROCEDURE — 99233 PR SUBSEQUENT HOSPITAL CARE,LEVL III: ICD-10-PCS | Mod: ,,, | Performed by: NURSE PRACTITIONER

## 2020-01-14 PROCEDURE — 83735 ASSAY OF MAGNESIUM: CPT

## 2020-01-14 PROCEDURE — 63600175 PHARM REV CODE 636 W HCPCS: Mod: JG

## 2020-01-14 PROCEDURE — 25000003 PHARM REV CODE 250

## 2020-01-14 RX ORDER — HEPARIN SODIUM 1000 [USP'U]/ML
INJECTION, SOLUTION INTRAVENOUS; SUBCUTANEOUS
Status: DISCONTINUED | OUTPATIENT
Start: 2020-01-14 | End: 2020-01-14

## 2020-01-14 RX ORDER — ATORVASTATIN CALCIUM 20 MG/1
80 TABLET, FILM COATED ORAL NIGHTLY
Status: DISCONTINUED | OUTPATIENT
Start: 2020-01-14 | End: 2020-02-04 | Stop reason: HOSPADM

## 2020-01-14 RX ORDER — ROCURONIUM BROMIDE 10 MG/ML
INJECTION, SOLUTION INTRAVENOUS
Status: DISCONTINUED | OUTPATIENT
Start: 2020-01-14 | End: 2020-01-14

## 2020-01-14 RX ORDER — PROPOFOL 10 MG/ML
5 INJECTION, EMULSION INTRAVENOUS CONTINUOUS
Status: DISCONTINUED | OUTPATIENT
Start: 2020-01-14 | End: 2020-01-14

## 2020-01-14 RX ORDER — MIDAZOLAM HYDROCHLORIDE 1 MG/ML
INJECTION INTRAMUSCULAR; INTRAVENOUS
Status: DISCONTINUED | OUTPATIENT
Start: 2020-01-14 | End: 2020-01-14

## 2020-01-14 RX ORDER — CLOPIDOGREL BISULFATE 75 MG/1
75 TABLET ORAL DAILY
Status: DISCONTINUED | OUTPATIENT
Start: 2020-01-15 | End: 2020-02-04 | Stop reason: HOSPADM

## 2020-01-14 RX ORDER — ESMOLOL HYDROCHLORIDE 10 MG/ML
INJECTION INTRAVENOUS
Status: DISCONTINUED | OUTPATIENT
Start: 2020-01-14 | End: 2020-01-14

## 2020-01-14 RX ORDER — PANTOPRAZOLE SODIUM 40 MG/10ML
40 INJECTION, POWDER, LYOPHILIZED, FOR SOLUTION INTRAVENOUS DAILY
Status: DISCONTINUED | OUTPATIENT
Start: 2020-01-15 | End: 2020-01-20

## 2020-01-14 RX ORDER — NICARDIPINE HYDROCHLORIDE 0.2 MG/ML
5 INJECTION INTRAVENOUS CONTINUOUS
Status: DISCONTINUED | OUTPATIENT
Start: 2020-01-14 | End: 2020-01-20

## 2020-01-14 RX ORDER — NAPROXEN SODIUM 220 MG/1
81 TABLET, FILM COATED ORAL DAILY
Status: DISCONTINUED | OUTPATIENT
Start: 2020-01-15 | End: 2020-02-04 | Stop reason: HOSPADM

## 2020-01-14 RX ORDER — PAPAVERINE HYDROCHLORIDE 30 MG/ML
INJECTION INTRAMUSCULAR; INTRAVENOUS
Status: DISCONTINUED | OUTPATIENT
Start: 2020-01-14 | End: 2020-01-14

## 2020-01-14 RX ORDER — POTASSIUM CHLORIDE 14.9 MG/ML
60 INJECTION INTRAVENOUS
Status: DISCONTINUED | OUTPATIENT
Start: 2020-01-14 | End: 2020-01-15

## 2020-01-14 RX ORDER — INDOMETHACIN 25 MG/1
50 CAPSULE ORAL ONCE
Status: COMPLETED | OUTPATIENT
Start: 2020-01-14 | End: 2020-01-14

## 2020-01-14 RX ORDER — OXYCODONE HYDROCHLORIDE 5 MG/1
5 TABLET ORAL EVERY 4 HOURS PRN
Status: DISCONTINUED | OUTPATIENT
Start: 2020-01-14 | End: 2020-02-04

## 2020-01-14 RX ORDER — MUPIROCIN 20 MG/G
1 OINTMENT TOPICAL 2 TIMES DAILY
Status: COMPLETED | OUTPATIENT
Start: 2020-01-14 | End: 2020-01-19

## 2020-01-14 RX ORDER — ONDANSETRON 2 MG/ML
4 INJECTION INTRAMUSCULAR; INTRAVENOUS EVERY 12 HOURS PRN
Status: DISCONTINUED | OUTPATIENT
Start: 2020-01-14 | End: 2020-01-15

## 2020-01-14 RX ORDER — PROPOFOL 10 MG/ML
VIAL (ML) INTRAVENOUS
Status: DISCONTINUED | OUTPATIENT
Start: 2020-01-14 | End: 2020-01-14

## 2020-01-14 RX ORDER — KETAMINE HCL IN 0.9 % NACL 50 MG/5 ML
SYRINGE (ML) INTRAVENOUS
Status: DISCONTINUED | OUTPATIENT
Start: 2020-01-14 | End: 2020-01-14

## 2020-01-14 RX ORDER — ACETAMINOPHEN 325 MG/1
650 TABLET ORAL EVERY 4 HOURS PRN
Status: DISCONTINUED | OUTPATIENT
Start: 2020-01-14 | End: 2020-01-26

## 2020-01-14 RX ORDER — ALBUMIN HUMAN 50 G/1000ML
SOLUTION INTRAVENOUS CONTINUOUS PRN
Status: DISCONTINUED | OUTPATIENT
Start: 2020-01-14 | End: 2020-01-14

## 2020-01-14 RX ORDER — TRANEXAMIC ACID 100 MG/ML
INJECTION, SOLUTION INTRAVENOUS CONTINUOUS PRN
Status: DISCONTINUED | OUTPATIENT
Start: 2020-01-14 | End: 2020-01-14

## 2020-01-14 RX ORDER — BISACODYL 10 MG
10 SUPPOSITORY, RECTAL RECTAL EVERY 12 HOURS PRN
Status: DISCONTINUED | OUTPATIENT
Start: 2020-01-14 | End: 2020-02-04

## 2020-01-14 RX ORDER — HYDROMORPHONE HYDROCHLORIDE 1 MG/ML
0.5 INJECTION, SOLUTION INTRAMUSCULAR; INTRAVENOUS; SUBCUTANEOUS ONCE
Status: COMPLETED | OUTPATIENT
Start: 2020-01-14 | End: 2020-01-14

## 2020-01-14 RX ORDER — ACETAMINOPHEN 325 MG/1
650 TABLET ORAL EVERY 6 HOURS PRN
Status: DISCONTINUED | OUTPATIENT
Start: 2020-01-14 | End: 2020-02-04

## 2020-01-14 RX ORDER — LIDOCAINE HYDROCHLORIDE 20 MG/ML
INJECTION, SOLUTION EPIDURAL; INFILTRATION; INTRACAUDAL; PERINEURAL
Status: DISCONTINUED | OUTPATIENT
Start: 2020-01-14 | End: 2020-01-14

## 2020-01-14 RX ORDER — ALBUMIN HUMAN 50 G/1000ML
SOLUTION INTRAVENOUS
Status: COMPLETED
Start: 2020-01-14 | End: 2020-01-14

## 2020-01-14 RX ORDER — TRANEXAMIC ACID 100 MG/ML
INJECTION, SOLUTION INTRAVENOUS
Status: DISCONTINUED | OUTPATIENT
Start: 2020-01-14 | End: 2020-01-14

## 2020-01-14 RX ORDER — POTASSIUM CHLORIDE 29.8 MG/ML
40 INJECTION INTRAVENOUS
Status: DISCONTINUED | OUTPATIENT
Start: 2020-01-14 | End: 2020-01-15

## 2020-01-14 RX ORDER — MAGNESIUM SULFATE HEPTAHYDRATE 40 MG/ML
4 INJECTION, SOLUTION INTRAVENOUS
Status: DISCONTINUED | OUTPATIENT
Start: 2020-01-14 | End: 2020-01-15

## 2020-01-14 RX ORDER — ALBUMIN HUMAN 50 G/1000ML
250 SOLUTION INTRAVENOUS ONCE
Status: COMPLETED | OUTPATIENT
Start: 2020-01-14 | End: 2020-01-14

## 2020-01-14 RX ORDER — ONDANSETRON 8 MG/1
8 TABLET, ORALLY DISINTEGRATING ORAL EVERY 8 HOURS PRN
Status: DISCONTINUED | OUTPATIENT
Start: 2020-01-14 | End: 2020-01-24

## 2020-01-14 RX ORDER — HEPARIN SODIUM 5000 [USP'U]/ML
5000 INJECTION, SOLUTION INTRAVENOUS; SUBCUTANEOUS EVERY 8 HOURS
Status: DISCONTINUED | OUTPATIENT
Start: 2020-01-14 | End: 2020-02-04 | Stop reason: HOSPADM

## 2020-01-14 RX ORDER — INDOMETHACIN 25 MG/1
100 CAPSULE ORAL ONCE
Status: COMPLETED | OUTPATIENT
Start: 2020-01-14 | End: 2020-01-14

## 2020-01-14 RX ORDER — PHENYLEPHRINE HYDROCHLORIDE 10 MG/ML
INJECTION INTRAVENOUS
Status: DISCONTINUED | OUTPATIENT
Start: 2020-01-14 | End: 2020-01-14

## 2020-01-14 RX ORDER — FENTANYL CITRATE 50 UG/ML
INJECTION, SOLUTION INTRAMUSCULAR; INTRAVENOUS
Status: DISCONTINUED | OUTPATIENT
Start: 2020-01-14 | End: 2020-01-14

## 2020-01-14 RX ORDER — ALBUMIN HUMAN 50 G/1000ML
12.5 SOLUTION INTRAVENOUS ONCE
Status: COMPLETED | OUTPATIENT
Start: 2020-01-14 | End: 2020-01-14

## 2020-01-14 RX ORDER — ONDANSETRON 2 MG/ML
4 INJECTION INTRAMUSCULAR; INTRAVENOUS ONCE AS NEEDED
Status: COMPLETED | OUTPATIENT
Start: 2020-01-14 | End: 2020-01-15

## 2020-01-14 RX ORDER — OXYCODONE HYDROCHLORIDE 5 MG/1
10 TABLET ORAL EVERY 4 HOURS PRN
Status: DISCONTINUED | OUTPATIENT
Start: 2020-01-14 | End: 2020-02-04

## 2020-01-14 RX ORDER — ACETAMINOPHEN 10 MG/ML
1000 INJECTION, SOLUTION INTRAVENOUS EVERY 8 HOURS
Status: COMPLETED | OUTPATIENT
Start: 2020-01-14 | End: 2020-01-15

## 2020-01-14 RX ORDER — POLYETHYLENE GLYCOL 3350 17 G/17G
17 POWDER, FOR SOLUTION ORAL DAILY
Status: DISCONTINUED | OUTPATIENT
Start: 2020-01-14 | End: 2020-02-04 | Stop reason: HOSPADM

## 2020-01-14 RX ORDER — PROTAMINE SULFATE 10 MG/ML
INJECTION, SOLUTION INTRAVENOUS
Status: DISCONTINUED | OUTPATIENT
Start: 2020-01-14 | End: 2020-01-14

## 2020-01-14 RX ORDER — ONDANSETRON 2 MG/ML
INJECTION INTRAMUSCULAR; INTRAVENOUS
Status: DISCONTINUED | OUTPATIENT
Start: 2020-01-14 | End: 2020-01-14

## 2020-01-14 RX ORDER — ASPIRIN 325 MG
325 TABLET ORAL DAILY
Status: DISCONTINUED | OUTPATIENT
Start: 2020-01-15 | End: 2020-01-14

## 2020-01-14 RX ORDER — HYDROMORPHONE HYDROCHLORIDE 1 MG/ML
0.2 INJECTION, SOLUTION INTRAMUSCULAR; INTRAVENOUS; SUBCUTANEOUS
Status: DISCONTINUED | OUTPATIENT
Start: 2020-01-15 | End: 2020-02-04

## 2020-01-14 RX ORDER — MAGNESIUM SULFATE HEPTAHYDRATE 40 MG/ML
2 INJECTION, SOLUTION INTRAVENOUS
Status: DISCONTINUED | OUTPATIENT
Start: 2020-01-14 | End: 2020-01-15

## 2020-01-14 RX ORDER — INDOMETHACIN 25 MG/1
CAPSULE ORAL
Status: COMPLETED
Start: 2020-01-14 | End: 2020-01-14

## 2020-01-14 RX ORDER — DEXTROSE MONOHYDRATE, SODIUM CHLORIDE, AND POTASSIUM CHLORIDE 50; 1.49; 9 G/1000ML; G/1000ML; G/1000ML
5 INJECTION, SOLUTION INTRAVENOUS CONTINUOUS
Status: DISCONTINUED | OUTPATIENT
Start: 2020-01-14 | End: 2020-01-26

## 2020-01-14 RX ORDER — ROCURONIUM BROMIDE 10 MG/ML
INJECTION, SOLUTION INTRAVENOUS
Status: DISPENSED
Start: 2020-01-14 | End: 2020-01-15

## 2020-01-14 RX ORDER — POTASSIUM CHLORIDE 29.8 MG/ML
80 INJECTION INTRAVENOUS
Status: DISCONTINUED | OUTPATIENT
Start: 2020-01-14 | End: 2020-01-15

## 2020-01-14 RX ORDER — SODIUM CHLORIDE 9 MG/ML
INJECTION, SOLUTION INTRAVENOUS CONTINUOUS
Status: DISCONTINUED | OUTPATIENT
Start: 2020-01-14 | End: 2020-01-16

## 2020-01-14 RX ORDER — NOREPINEPHRINE BITARTRATE/D5W 4MG/250ML
0.02 PLASTIC BAG, INJECTION (ML) INTRAVENOUS CONTINUOUS
Status: DISCONTINUED | OUTPATIENT
Start: 2020-01-14 | End: 2020-01-20

## 2020-01-14 RX ORDER — ACETAMINOPHEN 10 MG/ML
INJECTION, SOLUTION INTRAVENOUS
Status: DISCONTINUED | OUTPATIENT
Start: 2020-01-14 | End: 2020-01-14

## 2020-01-14 RX ORDER — DEXMEDETOMIDINE HYDROCHLORIDE 100 UG/ML
INJECTION, SOLUTION INTRAVENOUS
Status: DISCONTINUED | OUTPATIENT
Start: 2020-01-14 | End: 2020-01-14

## 2020-01-14 RX ORDER — ASPIRIN 325 MG
325 TABLET ORAL ONCE
Status: DISCONTINUED | OUTPATIENT
Start: 2020-01-14 | End: 2020-01-17

## 2020-01-14 RX ADMIN — ACETAMINOPHEN 1000 MG: 10 INJECTION, SOLUTION INTRAVENOUS at 09:01

## 2020-01-14 RX ADMIN — TRANEXAMIC ACID 900 MG: 100 INJECTION, SOLUTION INTRAVENOUS at 07:01

## 2020-01-14 RX ADMIN — ALBUMIN (HUMAN) 250 ML: 12.5 INJECTION, SOLUTION INTRAVENOUS at 10:01

## 2020-01-14 RX ADMIN — ROCURONIUM BROMIDE 20 MG: 10 INJECTION, SOLUTION INTRAVENOUS at 09:01

## 2020-01-14 RX ADMIN — PROTAMINE SULFATE 250 MG: 10 INJECTION, SOLUTION INTRAVENOUS at 10:01

## 2020-01-14 RX ADMIN — DEXMEDETOMIDINE HYDROCHLORIDE 4 MCG: 100 INJECTION, SOLUTION, CONCENTRATE INTRAVENOUS at 10:01

## 2020-01-14 RX ADMIN — NOREPINEPHRINE BITARTRATE 0.02 MCG/KG/MIN: 1 INJECTION, SOLUTION, CONCENTRATE INTRAVENOUS at 07:01

## 2020-01-14 RX ADMIN — FENTANYL CITRATE 50 MCG: 50 INJECTION, SOLUTION INTRAMUSCULAR; INTRAVENOUS at 08:01

## 2020-01-14 RX ADMIN — AMIODARONE HYDROCHLORIDE 1 MG/MIN: 1.8 INJECTION, SOLUTION INTRAVENOUS at 12:01

## 2020-01-14 RX ADMIN — DEXMEDETOMIDINE HYDROCHLORIDE 4 MCG: 100 INJECTION, SOLUTION, CONCENTRATE INTRAVENOUS at 08:01

## 2020-01-14 RX ADMIN — Medication 30 MG: at 07:01

## 2020-01-14 RX ADMIN — PROPOFOL 60 MG: 10 INJECTION, EMULSION INTRAVENOUS at 07:01

## 2020-01-14 RX ADMIN — HEPARIN SODIUM 5000 UNITS: 5000 INJECTION, SOLUTION INTRAVENOUS; SUBCUTANEOUS at 10:01

## 2020-01-14 RX ADMIN — MIDAZOLAM HYDROCHLORIDE 2 MG: 1 INJECTION, SOLUTION INTRAMUSCULAR; INTRAVENOUS at 07:01

## 2020-01-14 RX ADMIN — TRANEXAMIC ACID 1 MG/KG/HR: 100 INJECTION, SOLUTION INTRAVENOUS at 07:01

## 2020-01-14 RX ADMIN — ONDANSETRON 4 MG: 2 INJECTION INTRAMUSCULAR; INTRAVENOUS at 06:01

## 2020-01-14 RX ADMIN — FENTANYL CITRATE 150 MCG: 50 INJECTION, SOLUTION INTRAMUSCULAR; INTRAVENOUS at 07:01

## 2020-01-14 RX ADMIN — PHENYLEPHRINE HYDROCHLORIDE 50 MCG: 10 INJECTION INTRAVENOUS at 07:01

## 2020-01-14 RX ADMIN — INDOMETHACIN 100 MEQ: 25 CAPSULE ORAL at 07:01

## 2020-01-14 RX ADMIN — MUPIROCIN: 20 OINTMENT TOPICAL at 06:01

## 2020-01-14 RX ADMIN — ROCURONIUM BROMIDE 50 MG: 10 INJECTION, SOLUTION INTRAVENOUS at 08:01

## 2020-01-14 RX ADMIN — SODIUM BICARBONATE 50 MEQ: 84 INJECTION, SOLUTION INTRAVENOUS at 01:01

## 2020-01-14 RX ADMIN — NICARDIPINE HYDROCHLORIDE 1 MG/HR: 0.2 INJECTION, SOLUTION INTRAVENOUS at 08:01

## 2020-01-14 RX ADMIN — HEPARIN SODIUM 25000 UNITS: 1000 INJECTION, SOLUTION INTRAVENOUS; SUBCUTANEOUS at 09:01

## 2020-01-14 RX ADMIN — SUGAMMADEX 200 MG: 100 INJECTION, SOLUTION INTRAVENOUS at 11:01

## 2020-01-14 RX ADMIN — ALBUMIN (HUMAN): 12.5 SOLUTION INTRAVENOUS at 07:01

## 2020-01-14 RX ADMIN — MUPIROCIN 1 G: 20 OINTMENT TOPICAL at 08:01

## 2020-01-14 RX ADMIN — HYDROMORPHONE HYDROCHLORIDE 0.5 MG: 1 INJECTION, SOLUTION INTRAMUSCULAR; INTRAVENOUS; SUBCUTANEOUS at 08:01

## 2020-01-14 RX ADMIN — SODIUM CHLORIDE, SODIUM GLUCONATE, SODIUM ACETATE, POTASSIUM CHLORIDE, MAGNESIUM CHLORIDE, SODIUM PHOSPHATE, DIBASIC, AND POTASSIUM PHOSPHATE: .53; .5; .37; .037; .03; .012; .00082 INJECTION, SOLUTION INTRAVENOUS at 07:01

## 2020-01-14 RX ADMIN — CARVEDILOL 6.25 MG: 6.25 TABLET, FILM COATED ORAL at 05:01

## 2020-01-14 RX ADMIN — AMIODARONE HYDROCHLORIDE 0.5 MG/MIN: 1.8 INJECTION, SOLUTION INTRAVENOUS at 09:01

## 2020-01-14 RX ADMIN — ACETAMINOPHEN 1000 MG: 10 INJECTION, SOLUTION INTRAVENOUS at 01:01

## 2020-01-14 RX ADMIN — CLINDAMYCIN HYDROCHLORIDE 300 MG: 150 CAPSULE ORAL at 05:01

## 2020-01-14 RX ADMIN — SODIUM BICARBONATE 50 MEQ: 84 INJECTION, SOLUTION INTRAVENOUS at 10:01

## 2020-01-14 RX ADMIN — AMIODARONE HYDROCHLORIDE 150 MG: 1.5 INJECTION, SOLUTION INTRAVENOUS at 12:01

## 2020-01-14 RX ADMIN — ALBUMIN (HUMAN) 250 ML: 12.5 SOLUTION INTRAVENOUS at 02:01

## 2020-01-14 RX ADMIN — LIDOCAINE HYDROCHLORIDE 5 ML: 20 INJECTION, SOLUTION EPIDURAL; INFILTRATION; INTRACAUDAL; PERINEURAL at 07:01

## 2020-01-14 RX ADMIN — DEXTROSE MONOHYDRATE, SODIUM CHLORIDE, AND POTASSIUM CHLORIDE 5 ML/HR: 50; 9; 1.49 INJECTION, SOLUTION INTRAVENOUS at 01:01

## 2020-01-14 RX ADMIN — EPINEPHRINE 0.02 MCG/KG/MIN: 1 INJECTION INTRAMUSCULAR; INTRAVENOUS; SUBCUTANEOUS at 09:01

## 2020-01-14 RX ADMIN — HYDROMORPHONE HYDROCHLORIDE 0.5 MG: 1 INJECTION, SOLUTION INTRAMUSCULAR; INTRAVENOUS; SUBCUTANEOUS at 03:01

## 2020-01-14 RX ADMIN — VANCOMYCIN HYDROCHLORIDE 1.5 G: 1 INJECTION, POWDER, LYOPHILIZED, FOR SOLUTION INTRAVENOUS at 07:01

## 2020-01-14 RX ADMIN — HYDROMORPHONE HYDROCHLORIDE 0.2 MG: 1 INJECTION, SOLUTION INTRAMUSCULAR; INTRAVENOUS; SUBCUTANEOUS at 11:01

## 2020-01-14 RX ADMIN — SODIUM BICARBONATE 100 MEQ: 84 INJECTION, SOLUTION INTRAVENOUS at 07:01

## 2020-01-14 RX ADMIN — VASOPRESSIN 0.04 UNITS/MIN: 20 INJECTION INTRAVENOUS at 09:01

## 2020-01-14 RX ADMIN — ALBUMIN (HUMAN): 12.5 SOLUTION INTRAVENOUS at 09:01

## 2020-01-14 RX ADMIN — VASOPRESSIN 0.04 UNITS/MIN: 20 INJECTION INTRAVENOUS at 02:01

## 2020-01-14 RX ADMIN — SODIUM CHLORIDE: 0.9 INJECTION, SOLUTION INTRAVENOUS at 10:01

## 2020-01-14 RX ADMIN — ROCURONIUM BROMIDE 100 MG: 10 INJECTION, SOLUTION INTRAVENOUS at 07:01

## 2020-01-14 RX ADMIN — ACETAMINOPHEN 1000 MG: 10 INJECTION, SOLUTION INTRAVENOUS at 10:01

## 2020-01-14 RX ADMIN — SODIUM CHLORIDE 1 UNITS/HR: 9 INJECTION, SOLUTION INTRAVENOUS at 08:01

## 2020-01-14 RX ADMIN — METHADONE HYDROCHLORIDE 19.72 MG: 10 INJECTION, SOLUTION INTRAMUSCULAR; INTRAVENOUS; SUBCUTANEOUS at 07:01

## 2020-01-14 RX ADMIN — ALBUMIN (HUMAN) 12.5 G: 12.5 SOLUTION INTRAVENOUS at 01:01

## 2020-01-14 RX ADMIN — ALBUMIN HUMAN: 50 SOLUTION INTRAVENOUS at 07:01

## 2020-01-14 RX ADMIN — ESMOLOL HYDROCHLORIDE 10 MG: 10 INJECTION INTRAVENOUS at 07:01

## 2020-01-14 RX ADMIN — ONDANSETRON 4 MG: 2 INJECTION INTRAMUSCULAR; INTRAVENOUS at 07:01

## 2020-01-14 RX ADMIN — Medication 20 MG: at 08:01

## 2020-01-14 RX ADMIN — VANCOMYCIN HYDROCHLORIDE 1500 MG: 1.5 INJECTION, POWDER, LYOPHILIZED, FOR SOLUTION INTRAVENOUS at 06:01

## 2020-01-14 NOTE — NURSING
Patient off of floor at this time via wheelchair to surgical floor. Nad noted. VSS. Family escorted w/ patient.

## 2020-01-14 NOTE — PLAN OF CARE
Problem: Fall Injury Risk  Goal: Absence of Fall and Fall-Related Injury  Outcome: Ongoing, Progressing     Problem: Adult Inpatient Plan of Care  Goal: Plan of Care Review  Outcome: Ongoing, Progressing  Goal: Patient-Specific Goal (Individualization)  Outcome: Ongoing, Progressing  Goal: Absence of Hospital-Acquired Illness or Injury  Outcome: Ongoing, Progressing  Goal: Optimal Comfort and Wellbeing  Outcome: Ongoing, Progressing  Goal: Readiness for Transition of Care  Outcome: Ongoing, Progressing  Goal: Rounds/Family Conference  Outcome: Ongoing, Progressing     Problem: Infection  Goal: Infection Symptom Resolution  Outcome: Ongoing, Progressing     Problem: Diabetes Comorbidity  Goal: Blood Glucose Level Within Desired Range  Outcome: Ongoing, Progressing     Problem: Adjustment to Illness (Acute Coronary Syndrome)  Goal: Optimal Adaptation to Illness  Outcome: Ongoing, Progressing     Problem: Arrhythmia (Acute Coronary Syndrome)  Goal: Normalized Cardiac Rhythm  Outcome: Ongoing, Progressing     Problem: Pain (Acute Coronary Syndrome)  Goal: Absence of Cardiac-Related Pain  Outcome: Ongoing, Progressing     Problem: Hemodynamic Instability (Acute Coronary Syndrome)  Goal: Effective Cardiac Pump Function  Outcome: Ongoing, Progressing     Problem: Tissue Perfusion (Acute Coronary Syndrome)  Goal: Adequate Tissue Perfusion  Outcome: Ongoing, Progressing     Problem: Skin Injury Risk Increased  Goal: Skin Health and Integrity  Outcome: Ongoing, Progressing    POC  reviewed

## 2020-01-14 NOTE — PLAN OF CARE
Recommendations  1. As medically able, ADAT to Cardiac with texture per SLP.   2. If poor PO intake, recommend adding Boost Glucose Control OS to all meals.   3. Patient education on diabetic diet per policy for HgbA1c > 9.0 on 1/8.   RD to monitor.    Goals: Patient to receive nutrition by RD follow-up  Nutrition Goal Status: new    Full assessment completed, see RD Note 1/14/2020.

## 2020-01-14 NOTE — ANESTHESIA PROCEDURE NOTES
Central Line    Diagnosis: cabg  Patient location during procedure: done in OR  Procedure start time: 1/14/2020 7:25 AM  Timeout: 1/14/2020 7:20 AM  Procedure end time: 1/14/2020 7:28 AM    Staffing  Authorizing Provider: Carol Chiang MD  Performing Provider: Rush Joaquin MD    Staffing  Anesthesiologist: Carol Chiang MD  Other anesthesia staff: Rush Joaquin MD  Performed: other anesthesia staff   Anesthesiologist was present at the time of the procedure.  Preanesthetic Checklist  Completed: patient identified, site marked, surgical consent, pre-op evaluation, timeout performed, IV checked, risks and benefits discussed, monitors and equipment checked and anesthesia consent given  Indication   Indication: hemodynamic monitoring, vascular access, med administration     Anesthesia   general anesthesia    Central Line   Skin Prep: skin prepped with ChloraPrep, skin prep agent completely dried prior to procedure  maximum sterile barriers used during central venous catheter insertion  hand hygiene performed prior to central venous catheter insertion  Location: right, internal jugular.   Catheter type: double lumen  Catheter Size: 12 Fr  Ultrasound: vascular probe with ultrasound  Vessel Caliber: patent  Needle advanced into vessel with real time Ultrasound guidance.  Image recorded and saved.   Manometry: Venous cannualation confirmed by visual estimation of blood vessel pressure using manometry.  Insertion Attempts: 2   Securement:line sutured, chlorhexidine patch, sterile dressing applied and blood return through all ports    Post-Procedure   Adverse Events:none    Guidewire Guidewire removed intact.

## 2020-01-14 NOTE — CONSULTS
"  [facility]  Adult Nutrition  Consult Note    SUMMARY     Recommendations  1. As medically able, ADAT to Cardiac with texture per SLP.   2. If poor PO intake, recommend adding Boost Glucose Control OS to all meals.   3. Patient education on diabetic diet per policy for HgbA1c > 9.0 on 1/8.   RD to monitor.    Goals: Patient to receive nutrition by RD follow-up  Nutrition Goal Status: new  Communication of RD Recs: discussed on rounds    Reason for Assessment  Reason For Assessment: consult  Diagnosis: surgery/postoperative complications(s/p CABG 1/14)  Relevant Medical History: CAD, CHF, DM2, HLD, HTN, GERD, PAD  Interdisciplinary Rounds: attended  General Information Comments: OR this morning for CABG. Extubated in OR, currently on face mask. Patient in significant amount of pain at time of visit, moaning. Per chart review, patient with stable weight PTA. Good PO intake prior to surgery. Patient appears nourished.  Nutrition Discharge Planning: Adequate nutrition via PO intake.    Nutrition Risk Screen  Nutrition Risk Screen: no indicators present    Nutrition/Diet History  Spiritual, Cultural Beliefs, [employer] Practices, Values that Affect Care: no  Factors Affecting Nutritional Intake: NPO    Anthropometrics  Temp: 98.6 °F (37 °C)  Height Method: Stated  Height: 5' 4" (162.6 cm)  Height (inches): 64 in  Weight Method: Bed Scale  Weight: 98.6 kg (217 lb 6 oz)  Weight (lb): 217.38 lb  Ideal Body Weight (IBW), Female: 120 lb  % Ideal Body Weight, Female (lb): 165 %  BMI (Calculated): 37.3  BMI Grade: 35 - 39.9 - obesity - grade II    Lab/Procedures/Meds  Pertinent Labs Reviewed: reviewed  Pertinent Labs Comments: Glu 174, POCT Glu 175, HgbA1c 9.7, Alb 2.6  Pertinent Medications Reviewed: reviewed  Pertinent Medications Comments: pantoprazole, amiodarone, epinephrine, insulin drip, cardene, levophed, vasopressin    Estimated/Assessed Needs  Weight Used For Calorie Calculations: 98.6 kg (217 lb 6 " oz)  Energy Calorie Requirements (kcal): 1970 kcal/day  Energy Need Method: Mono-St Jeor(x 1.25)  Protein Requirements:  g/day(1.0-1.2 g/kg)  Weight Used For Protein Calculations: 98.6 kg (217 lb 6 oz)  Fluid Requirements (mL): 1 mL/kcal or per MD  Estimated Fluid Requirement Method: RDA Method  RDA Method (mL): 1970    Nutrition Prescription Ordered  Current Diet Order: NPO    Evaluation of Received Nutrient/Fluid Intake  I/O: -7.4L since admit  Comments: LBM 1/12  % Intake of Estimated Energy Needs: 0 - 25 %  % Meal Intake: NPO    Nutrition Risk  Level of Risk/Frequency of Follow-up: high(2x/week)     Assessment and Plan  Nutrition Problem  Increased nutrient needs    Related to (etiology):   Physiological causes related to healing    Signs and Symptoms (as evidenced by):   S/p CABG 1/14     Interventions (treatment strategy):  Collaboration of nutrition care with other providers    Nutrition Diagnosis Status:   New    Monitor and Evaluation  Food and Nutrient Intake: energy intake, food and beverage intake  Food and Nutrient Adminstration: diet order  Knowledge/Beliefs/Attitudes: food and nutrition knowledge/skill  Physical Activity and Function: nutrition-related ADLs and IADLs  Anthropometric Measurements: weight, weight change  Biochemical Data, Medical Tests and Procedures: electrolyte and renal panel, gastrointestinal profile, glucose/endocrine profile, inflammatory profile  Nutrition-Focused Physical Findings: overall appearance     Nutrition Follow-Up  RD Follow-up?: Yes

## 2020-01-14 NOTE — ANESTHESIA PROCEDURE NOTES
Intubation  Performed by: Rush Joaquin MD  Authorized by: Carol Chiang MD     Intubation:     Induction:  Intravenous    Intubated:  Postinduction    Mask Ventilation:  Easy mask    Attempts:  1    Attempted By:  Resident anesthesiologist    Method of Intubation:  Direct    Blade:  Diaz 2    Laryngeal View Grade: Grade I - full view of chords      Difficult Airway Encountered?: No      Complications:  None    Airway Device:  Oral endotracheal tube    Airway Device Size:  7.5    Style/Cuff Inflation:  Cuffed    Tube secured:  22    Secured at:  The lips    Placement Verified By:  Capnometry and Colorimetric ETCO2 device    Complicating Factors:  None    Findings Post-Intubation:  BS equal bilateral

## 2020-01-14 NOTE — H&P
Updated H&P Note  Cardiothoracic Surgery    Suyapa Connelly is a 54 y.o. female with DM, HLD, HTN, HF, CAD with multiple lesions on [facility].      Plan: to undergo CABG today. Received coreg this morning. Please see cardiothoracic consult note from 1/2/20 for further details.    Christian Dobson MD  Cardiothoracic Surgery Fellow

## 2020-01-14 NOTE — ASSESSMENT & PLAN NOTE
"BG goal 140 - 180   BG is above goal s/p cardiac procedure. Most likely secondary to vasopressor therapy, and cardiac stress response.     Continue IV insulin infusion protocol  Requires intensive BG monitoring while on protocol     ** Please call Endocrine for any BG related issues **  ** Please notify Endocrine for any change and/or advance in diet**      Discharge Planning:     TBD. Please notify endocrinology prior to discharge. Patient will need adjustments to home insulin regimen.     Tentative:     Levemir 20 units HS.     Novolog 7 units TIDWM.     Low Dose SQ Insulin Correction Scale.    BG Monitoring AC/HS     PRN Insurance preferred diabetes testing supplies    PRN Ultra-Fine Junie Pen Needles 4mm x 32 G (5/32" x 0.23mm).     Patient request outpatient follow-up with [facility] for continued DM management.        "

## 2020-01-14 NOTE — SUBJECTIVE & OBJECTIVE
"Interval HPI:   Overnight events:  Patient is now s/p CABG, extubated, and admitted to SICU s/p CABG procedure.   No diet orders on file    Eating:   NPO  Nausea: No  Hypoglycemia and intervention: No  Fever: No  TPN and/or TF: No  If yes, type of TF/TPN and rate: None    /60   Pulse 61   Temp 97.3 °F (36.3 °C)   Resp 13   Ht 5' 4" (1.626 m)   Wt 98.6 kg (217 lb 6 oz)   LMP 09/30/2015 (Exact Date)   SpO2 100%   Breastfeeding? No   BMI 37.31 kg/m²     Labs Reviewed and Include    Recent Labs   Lab 01/14/20  1155   *   CALCIUM 8.4*   ALBUMIN 2.6*   PROT 5.9*      K 4.6  4.6   CO2 20*      BUN 20   CREATININE 0.9   ALKPHOS 76   ALT 16   AST 16   BILITOT 0.6     Lab Results   Component Value Date    WBC 17.28 (H) 01/14/2020    HGB 8.7 (L) 01/14/2020    HCT 25 (L) 01/14/2020    MCV 99 (H) 01/14/2020     01/14/2020     No results for input(s): TSH, FREET4 in the last 168 hours.  Lab Results   Component Value Date    HGBA1C 9.7 (H) 01/03/2020       Nutritional status:   Body mass index is 37.31 kg/m².  Lab Results   Component Value Date    ALBUMIN 2.6 (L) 01/14/2020    ALBUMIN 2.6 (L) 01/14/2020    ALBUMIN 2.4 (L) 01/13/2020     No results found for: PREALBUMIN    Estimated Creatinine Clearance: 82.5 mL/min (based on SCr of 0.9 mg/dL).    Accu-Checks  Recent Labs     01/13/20  0838 01/13/20  1233 01/13/20  1720 01/13/20  2247 01/14/20  0603 01/14/20  1152 01/14/20  1253 01/14/20  1400 01/14/20  1503 01/14/20  1602   POCTGLUCOSE 171* 122* 141* 195* 171* 175* 233* 215* 231* 243*       Current Medications and/or Treatments Impacting Glycemic Control  Immunotherapy:    Immunosuppressants     None        Steroids:   Hormones (From admission, onward)    Start     Stop Route Frequency Ordered    01/14/20 1230  vasopressin (PITRESSIN) 0.2 Units/mL in dextrose 5 % 100 mL infusion      -- IV Continuous 01/14/20 1129        Pressors:    Autonomic Drugs (From admission, onward)    Start     " Stop Route Frequency Ordered    01/14/20 1230  EPINEPHrine (ADRENALIN) 5 mg in sodium chloride 0.9% 250 mL infusion     Question Answer Comment   Titrate by: (in mcg/kg/min) 0.01    Titrate interval: (in minutes) 5    Titrate to maintain: (SBP or MAP or Cardiac Index) SBP    Maximum dose: (in mcg/kg/min) 2        -- IV Continuous 01/14/20 1129    01/14/20 1230  norepinephrine 4 mg in dextrose 5% 250 mL infusion (premix) (titrating)     Question Answer Comment   Titrate by: (in mcg/kg/min) 0.05    Titrate interval: (in minutes) 5    Titrate to maintain: (MAP or SBP) MAP    Greater than: (in mmHg) 65    Maximum dose: (in mcg/kg/min) 3        -- IV Continuous 01/14/20 1129        Hyperglycemia/Diabetes Medications:   Antihyperglycemics (From admission, onward)    Start     Stop Route Frequency Ordered    01/14/20 1230  insulin regular (Humulin R) 100 Units in sodium chloride 0.9% 100 mL infusion     Question:  Insulin rate changes (DO NOT MODIFY ANSWER)  Answer:  [email or web address]    -- IV Continuous 01/14/20 1129

## 2020-01-14 NOTE — PROGRESS NOTES
This writer spoke to cardiothoracic fellow and was informed not to give Lopressor because the patient received Coreg.

## 2020-01-14 NOTE — OP NOTE
[facility]  Cardiothoracic Surgery Operative Report    Patient Name:  Suyapa Connelly; [id number]    Preoperative Diagnosis: Coronary Artery Disease with recent NSTEMI and heart failure    Postoperative Diagnosis:  Same    Date of Operation:  01/14/2020    Operation:  Single vessel off pump coronary artery bypass grafting (OPCAB)  · Left internal mammary artery to the distal left anterior descending artery    Surgeon:  Huang Altamirnao MD    Assistant Surgeon:  none    Fellow:  Christian Dobson MD    Anesthesiologist:  Carol Chiang MD      ---------------------------------------------------------------------------------------------------------------------      Indications for surgery: Ms. Connelly is a 54 year-old woman with a history of heart failure reduced ejection fraction (35% ejection fraction) due to ischemic cardiomyopathy, known coronary artery disease s/p MI (2012), peripheral arterial disease s/p stents to bilateral lower extremities (2015), hypertension, and poorly controlled diabetes (HbA1C of 10.3 12/7/2019 and 9.7 on 1/3/2020), who was admitted with NSTEMI (troponin max 0.17) and heart failure exasperation (BNP 1,013), which was complicated by acute kidney injury.  Prior to this episode, she has been symptomatic with dyspnea on exertion (about 1 block) and claudication (at 50 feet).  Her most recent echocardiogram showed 35% ejection fraction, LVEDD of 5.2cm, mild to moderate mitral regurgitation, mild to moderate tricuspid regurgitation, estimated systolic PA pressure of 49mmHg.  Angiogram showed 90% early mid LAD lesion with a moderate sized distal LAD target in a very long wrap around LAD, significant disease in a small first diagonal artery and diffusely diseased second diagonal artery, 70% lesion in mid LCx, and diffusely diseased small RCA with the inferior septum being supplied by the wrap around LAD (minimal PDA noted).  CTA chest showed minimal ascending aortic calcifications and mild  aortic arch calcifications.  CTA of the lower extremities showed severe peripheral vascular disease with multiple total occlusion and significant stenoses.     We had an extensive discussion with her regarding her symptoms and disease, particularly focusing on the very poor control of her diabetes in which she has worsening ischemic heart disease and worsening peripheral vascular disease.  He HbA1C has been improving since seeing Dr. Fernando in early December and this is her second heart failure admission in one month, so we should proceed with surgery, which will be hybrid revascularization with off pump coronary artery bypass surgery (LIMA-LAD) and postoperative percutaneous coronary intervention.  Her significant lower extremity peripheral vascular disease is a concern.  The risk of lower extremity ischemia is high.  Additionally, the risk of sternal wound infection is high due to her poor diabetic control and her risk of recurrent acute kidney injury/worsened kidney function is increased due to preoperative acute kidney injury.  I had an extensive conversation with her and her family about these risks and they agreed to proceed with surgery.    Gross findings: No pericardial adhesions.  Moderate to large sized LAD target.  Mild to moderate LV dysfunction pre anastomosis and improved function post anastomosis.      Procedure:  The patient was brought to the holding area and the indications for surgery were reviewed.  She was placed supine on the operating room table and prepped and draped in the usual sterile fashion. A surgical time out was performed.     A midline incision was followed with division of the sternum. The left internal mammary artery was harvested in a pedicled fashion. ACT guided heparinization was administered for a goal ACT of 400 seconds.      Attention was turned to the distal left anterior descending artery.  The heart was positioned and the LAD was identified.  The off pump surgery  stabilizer device was placed on the epicardium and surrounding the vessel. A deep silastic suture was placed around the vessel in a figure of eight fashion proximal to the target anastomotic site.  This silastic suture was then tightened. The vessel was opened and the arteriotomy elongated with scissors.  The LIMA was brought to the field and prepared for use.  The LIMA was sewn end to side to the LAD with continuous 7-0 prolene. The vessel was briefly unclamped to deair, tied, and then unclamped again to assess for hemostasis.  After ensuring good hemostasis, the vessel remained unclamped and the silastic suture was removed.  Minimal bleeding was noted from the exit sites.    Once appropriate hemostasis was confirmed, heparin was reversed.  Temporary ventricular pacing wires were placed on the heart.  Drainage tubes were placed behind the sternum and in the pleural space. The chest was closed with steel wires and the soft tissue was closed with absorbable suture.  A sterile dressing was applied and the patient was brought to the ICU in stable condition.      I was present in the operating room for the entire operation and immediately available thereafter.

## 2020-01-14 NOTE — ANESTHESIA PROCEDURE NOTES
KATE    Diagnosis: Dx: NSTEMI (non-ST elevated myocardial infarction) (I21.4 (ICD-10-CM))  Patient location during procedure: OR  Procedure start time: 1/14/2020 12:33 PM  Timeout: 1/14/2020 12:33 PM  Procedure end time: 1/14/2020 12:33 PM  Exam type: Baseline  Staffing  Anesthesiologist: Carol Chiang MD  Performed: fellow and anesthesiologist   Preanesthetic Checklist  Completed: patient identified, surgical consent, pre-op evaluation, timeout performed, risks and benefits discussed, monitors and equipment checked, anesthesia consent given, oxygen available, suction available, hand hygiene performed and patient being monitored  Setup & Induction  Patient preparation: bite block inserted  Probe Insertion: easy  Exam: complete      Findings  Impression  Other Findings  Off pump LIMA to LAD    The left ventricle has mildly reduced systolic function EF 45%  Trace AI, with what appears to be an isolated lambl's excrescence   Trace MR   RVSP 24   No IAS defect, no PFO   No effusions   Aorta intact, no dissection   GLS -12   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 visualized with no apparent clot   Following LIMA - LAD function remains as prior   Probe removed atraumatically   Probe Removal      Exam     Left Heart  Left Atruim: normal  Left appendage velocity:41    Left Ventricle: cm, normal (men 4.2-5.9; women 3.8-5.2)  LV Wall Thickness (posterior wall): cm, normal (men 0.6-1.0 cm; women 0.6-0.9 cm)    LVAD:no  Estimated Ejection Fraction: 45-54% mild            Right Heart  Right Ventricle: normal  Right Ventricle Function: normal  Right Atria: cm and normal    Intra Atrial Septum  PFO: no shunt by color flow doppler  no IAS aneurysm  no lipomatous hypertrophy  no Atrial Septal Defect (Asd)    Right Ventricle  Size: normal, Free Wall Thickness: normal    Aortic Valve:  Morphology: trileaflet    Mitral Valve:  Morphology:normal    Tricuspid Valve:  Morphology: normal    Pulmonic Valve:  Morphology:normal    Great  Vessels  Ascending Aorta Atherosclerosis: 2=mild dz (<3mm)  Aortic Arch Atherosclerosis: 2=mild dz (<3mm)  IABP: no  Descending Aorta Atherosclerosis: 2=mild dz (<3mm)  Aorta    Descending aorta IABP: no    Effusions  Effusions: none    Summary  Findings discussed with surgeon.    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

## 2020-01-14 NOTE — NURSING
Patient prepped for surgery at this time. Nad noted. BP reassessed at 1441/71. No other concerns noted. Will continue to monitor pt t/o shift.

## 2020-01-14 NOTE — PROGRESS NOTES
This writer spoke to the  and had her page the cardiothoracic fellow working with Dr. Altamirano, awaiting call back.

## 2020-01-14 NOTE — TRANSFER OF CARE
"Anesthesia Transfer of Care Note    Patient: Suyapa Connelly    Procedure(s) Performed: Procedure(s) (LRB):  CORONARY ARTERY BYPASS GRAFT (CABG) x 1     Off Pump (N/A)    Patient location: ICU    Anesthesia Type: general    Transport from OR: Transported from OR on 6-10 L/min O2 by face mask with adequate spontaneous ventilation. Continuous ECG monitoring in transport. Continuous SpO2 monitoring in transport. Continuos invasive BP monitoring in transport    Post pain: adequate analgesia    Post assessment: no apparent anesthetic complications    Post vital signs: stable    Level of consciousness: awake    Nausea/Vomiting: no nausea/vomiting    Complications: none          Last vitals:   Visit Vitals  /79 (BP Location: Right arm, Patient Position: Lying)   Pulse 84   Temp 37 °C (98.6 °F) (Oral)   Resp 16   Ht 5' 4" (1.626 m)   Wt 98.6 kg (217 lb 6 oz)   LMP 09/30/2015 (Exact Date)   SpO2 98%   Breastfeeding? No   BMI 37.31 kg/m²     "

## 2020-01-14 NOTE — NURSING
Received patient resting in bed. A/O x4. Nad noted. VSS. Patient denies any c/o pain/discomfort at this time. RR even, unlabored. Denies SOB. Safety/fall measures in place. SR up x2. Bed low & locked. Pt encouraged to call for assistance when needed. No other concerns noted. Will continue to monitor pt t/o shift. POC reviewed. Pt verb understanding of NPO p MN order. Family/spouse present @ BS.

## 2020-01-14 NOTE — H&P
[facility]  History & Physical  Cardiothoracic Surgery    SUBJECTIVE:     Chief Complaint/Reason for Admission: CAD    History of Present Illness:  Ms. Connelly is a 55 y/o AAF with known medical medical issues of chronic combined systolic and diastolic HF (EF 35% Grade II DD), CAD S/P MI 2010, Angiogram 12/18/19 mid LAD 90% stenosis, mid OM2 75% stenosis, and Ostial D1 70% stenosis LVDEP 25 mmHg, PAD S/P PTA with stents BLE 2015, IDDM (A1c 10.3%), HTN, HLD, GERD, Anxiety/ Depression. Patient present with a 2 day history of nausea and with multiple episodes of non-bilious non-bloody emesis and couple of episodes of watery non-bloody diarrhea. Patient reports initially feeling HARTMAN without associated chest pain/tightness for the past week before progressively worsening to the GI symptoms described. She reports having a 7 lb weight gain (dry weight unknown) and only able to walk less than 20-30 ft before feeling short of breath. She reports that 5-10 minute rest resolves her dyspnea and she never has shortness of breath at rest. She reports feeling similar symptoms to this when she was admitted to the hospital on 12/6/19 for ADHF. On Monday 12/30 she doubled her home torsemide dosage to help reduce the excess water weight but reports that it did not help much. When he GI symptoms developed on Tuesday, she reports being unable to tolerate anything P.O. and subsequently stopped taking all of her home medications including her home insulin. What prompted her to come into the hospital was a fluttering feeling in her chest associated with some diaphoresis. She reports not remembering the symptoms she felt when she was having her MI in 2010 but  at bedside reported that she had nausea and vomiting leading up to her MI and she collapsed and lost consciousness and then later once admitted to the hospital was told she had had a heart attack. Currently the patient reports feeling nauseated,  "diaphoretic, a mild headache, epigastric pain, with a vague "heart burn" like feeling in her chest. She denies light headedness, chest tightness, dyspnea, palpitations, dysuria, constipation, edema. She denies any recent sick contacts and fevers at home.    In the ED, , EKG showed sinus tachycardia without ST changes, 1st troponin 0.176, BNP 1,013, CXR - mild interstitial edema with pulmonary vascular congestion. She was seen by cardiology who suggested an ACS work up due to her recent angiogram showing vessel blockages and her being a diabetic with similar vague symptoms from last MI. They also suggested for CTS to be consulted to assess for CABG. Patient was admitted to hospital medicine Team 2 for further management and work up. Patient has now subsequently undergone one vessel CABG (LIMA to LAD) off pump. Procedure went according to plan, no blood products given. Was extubated in OR, given methadone and precedex for pain control. Came up on 0.04 levo. Upon assessment in ICU noted to be afib; started on amiodarone. Other VSS.     PTA Medications   Medication Sig    acetaminophen (TYLENOL) 500 MG tablet Take 1,000 mg by mouth daily as needed for Pain.    aspirin 81 MG Chew Take 1 tablet (81 mg total) by mouth once daily.    atorvastatin (LIPITOR) 80 MG tablet Take 1 tablet (80 mg total) by mouth once daily.    blood sugar diagnostic (ONETOUCH ULTRA BLUE TEST STRIP) Strp Use to test blood glucose twice daily (Patient taking differently: Tests 3 times daily)    blood-glucose meter Misc Use to test blood glucose    carvedilol (COREG) 12.5 MG tablet Take 1 tablet (12.5 mg total) by mouth 2 (two) times daily with meals.    escitalopram oxalate (LEXAPRO) 10 MG tablet Take 1 tablet (10 mg total) by mouth once daily.    famotidine (PEPCID) 20 MG tablet Take 1 tablet (20 mg total) by mouth 2 (two) times daily.    gabapentin (NEURONTIN) 300 MG capsule Take 1 capsule (300 mg total) by mouth 3 (three) times " "daily.    [] hydrOXYzine pamoate (VISTARIL) 50 MG Cap Take 1 capsule (50 mg total) by mouth every 6 (six) hours as needed (anxiety).    insulin aspart U-100 (NOVOLOG) 100 unit/mL injection Inject 9 Units into the skin 3 (three) times daily with meals. (Patient taking differently: Inject 8-9 Units into the skin 3 (three) times daily with meals. )    insulin glargine 100 units/mL (3mL) SubQ pen Inject 50 Units into the skin every evening.    lancets 30 gauge Misc use to test blood glucose 2 (two) times daily with meals.    lisinopril (PRINIVIL,ZESTRIL) 40 MG tablet Take 1 tablet (40 mg total) by mouth once daily.    multivitamin (THERAGRAN) per tablet Take 1 tablet by mouth once daily.    ondansetron (ZOFRAN-ODT) 8 MG TbDL Dissolve 1 tablet (8 mg total) by mouth every 8 (eight) hours as needed.    [] pen needle, diabetic 31 gauge x 5/16" Ndle Use to inject insulin 2 (two) times daily with meals.    torsemide (DEMADEX) 20 MG Tab Take 2 tablets (40 mg total) by mouth once daily. (Patient taking differently: Take 1 tablet by mouth daily ( as needed if weight gain patient takes 2 tablets daily ))    [] pen needle, diabetic 32 gauge x 5/32" Ndle 1 each by Misc.(Non-Drug; Combo Route) route 2 (two) times daily with meals.       Review of patient's allergies indicates:   Allergen Reactions    Contrast media      Kidney injury    Pcn [penicillins]      Rash; tolerated ceftriaxone on 20       Past Medical History:   Diagnosis Date    Anxiety     Depression     Diabetes mellitus     type 2    GERD (gastroesophageal reflux disease)     Hyperlipemia     Hypertension     Myocardial infarction     minor-caused by stress per pt.     Past Surgical History:   Procedure Laterality Date    Angiogram - Right Extremity Right 7/9/15    angiogram-left leg  10/6/15    CATHETERIZATION OF BOTH LEFT AND RIGHT HEART N/A 2019    Procedure: CATHETERIZATION, HEART, BOTH LEFT AND RIGHT;  " Surgeon: Que Fernando III, MD;  Location: [facility] CATH LAB;  Service: Cardiology;  Laterality: N/A;    CORONARY ANGIOGRAPHY N/A 12/18/2019    Procedure: ANGIOGRAM, CORONARY ARTERY;  Surgeon: Que Fernando III, MD;  Location: [facility] CATH LAB;  Service: Cardiology;  Laterality: N/A;     Family History   Problem Relation Age of Onset    Anesthesia problems Neg Hx      Social History     Tobacco Use    Smoking status: Never Smoker    Smokeless tobacco: Never Used   Substance Use Topics    Alcohol use: No    Drug use: Yes     Types: Marijuana     Comment: used yesterday        Review of Systems:  Review of systems not obtained due to patient factors patient still sedated from procedure..    OBJECTIVE:     Vital Signs (Most Recent):  Temp: 98.6 °F (37 °C) (01/14/20 0605)  Pulse: 84 (01/14/20 0605)  Resp: 14 (01/14/20 1158)  BP: 139/79 (01/14/20 0605)  SpO2: (!) 93 % (01/14/20 1158)    Admission: Weight: 117.9 kg (260 lb) (01/02/20 1031)   Most Recent: Weight: 98.6 kg (217 lb 6 oz) (01/12/20 0500)    Physical Exam:  General: well developed, well nourished, no distress  Head: normocephalic, extubated with face mask in place  Lungs:  clear to auscultation bilaterally and normal respiratory effort  Heart: Irregular heart rhythm (afib)  Abdomen: soft, non-tender to palpation   Extremities: warm, well perfused and no cyanosis or edema, or clubbing  Skin: Skin color, texture, turgor normal. No rashes or lesions  Neurologic: No focal numbness or weakness  Mental status: mildly sedated from procedure.    Laboratory:  CBC:   Recent Labs   Lab 01/14/20 0343 01/14/20 1158   WBC 7.79  --    RBC 3.35*  --    HGB 10.4*  --    HCT 33.0* 29*     --    MCV 99*  --    MCH 31.0  --    MCHC 31.5*  --      CMP:   Recent Labs   Lab 01/14/20  0343   *   CALCIUM 9.0   ALBUMIN 2.6*   PROT 7.0      K 4.7   CO2 22*      BUN 26*   CREATININE 1.1   ALKPHOS 96   ALT 21   AST 20   BILITOT 0.3     Coagulation: No  results for input(s): LABPROT, INR, APTT in the last 168 hours.    Diagnostic Results:  Imaging Reviewed.    ASSESSMENT/PLAN:     Neuro:   - Sedation: weaned off  - Pain control: prn APAP, oxy. Prn dilaudid as needed.   - Received one time dose IV APAP 1 gram post-op.      Pulmonary:   - Arrived to ICU extubated on 10L Face Mask, O2 > 92%  - Wean from O2 as able  - Daily CXR  - ABG q1hr x 6     Cardiac:  - Drips: Levo @ 0.1, Vaso @ 0.04  - Wean from vasopressors as able  - Ok to receive 1.5 L albumin over first 12 hours  - Patient with pre-op EF of about 30%, intra-op EF of about 50%  - No blood products received in OR  - Goals MAP > 65, SBP < 130     Renal:   - BUN/CR: 20/0.9  - Trend BMP  - Kerns in place for close monitoring of UOP     Infectious Disease:   - Afebrile post-op, WBC 17.28  - Trend WBC  - Antibiotics: No abx     Hematology/Oncology:  - post-op H/H 8.7/27.4  - Trend CBC  - Monitor CT output  - Minimal bleeding during case     Endocrine:  - Endocrine consulted, keep blood glucose goals 140-180     Fluids/Electrolytes/Nutrition/GI:   - D5 NS 20 KCl @ 5  - 1.5L albumin ok over first 12 hr  - Trend CMP  - Replace Lytes PRN   - NPO for now    Prophylaxis:  - GI: protonix daily     Dispo:  Continue ICU care, continue close monitoring of ABGs/wean from oxygen as able. Pain control.  Primary team: JI Galvez MD  Critical Care Surgery, PGYII [facility]

## 2020-01-14 NOTE — PROGRESS NOTES
"[facility]  Endocrinology  Progress Note    Admit Date: 1/2/2020     Reason for Consult: Management of T2DM, Hyperglycemia     Surgical Procedure and Date: n/a    Diabetes diagnosis year:  2006    Home Diabetes Medications:  Lantus 50 units daily and novolog 9 units with meals   Lab Results   Component Value Date    HGBA1C 9.7 (H) 01/03/2020         How often checking glucose at home? 1-3 x day   BG readings on regimen: 100-200 typically; sometimes in 300s   Hypoglycemia on the regimen?  Yes occasionally - usually overnight   Missed doses on regimen?  Yes    Diabetes Complications include:     Hyperglycemia, Hypoglycemia  and Diabetic peripheral neuropathy     Complicating diabetes co morbidities:   History of MI and CHF      HPI:   Patient is a 54 y.o. female with a diagnosis of uncontrolled type 2 DM. Also with CHF, PAD, HTN, HLD, and CAD. Patient presented with chest pain and diaphoresis. Also with nauseated, diaphoretic, a mild headache, epigastric pain, with a vague "heart burn" like feeling in her chest. Patient admitted for treatment and further workup. Endocrinology consulted for BG/ DM management.             Interval HPI:   Overnight events:  Patient is now s/p CABG, extubated, and admitted to SICU s/p CABG procedure.   No diet orders on file    Eating:   NPO  Nausea: No  Hypoglycemia and intervention: No  Fever: No  TPN and/or TF: No  If yes, type of TF/TPN and rate: None    /60   Pulse 61   Temp 97.3 °F (36.3 °C)   Resp 13   Ht 5' 4" (1.626 m)   Wt 98.6 kg (217 lb 6 oz)   LMP 09/30/2015 (Exact Date)   SpO2 100%   Breastfeeding? No   BMI 37.31 kg/m²      Labs Reviewed and Include    Recent Labs   Lab 01/14/20  1155   *   CALCIUM 8.4*   ALBUMIN 2.6*   PROT 5.9*      K 4.6  4.6   CO2 20*      BUN 20   CREATININE 0.9   ALKPHOS 76   ALT 16   AST 16   BILITOT 0.6     Lab Results   Component Value Date    WBC 17.28 (H) 01/14/2020    HGB 8.7 (L) 01/14/2020 "    HCT 25 (L) 01/14/2020    MCV 99 (H) 01/14/2020     01/14/2020     No results for input(s): TSH, FREET4 in the last 168 hours.  Lab Results   Component Value Date    HGBA1C 9.7 (H) 01/03/2020       Nutritional status:   Body mass index is 37.31 kg/m².  Lab Results   Component Value Date    ALBUMIN 2.6 (L) 01/14/2020    ALBUMIN 2.6 (L) 01/14/2020    ALBUMIN 2.4 (L) 01/13/2020     No results found for: PREALBUMIN    Estimated Creatinine Clearance: 82.5 mL/min (based on SCr of 0.9 mg/dL).    Accu-Checks  Recent Labs     01/13/20  0838 01/13/20  1233 01/13/20  1720 01/13/20  2247 01/14/20  0603 01/14/20  1152 01/14/20  1253 01/14/20  1400 01/14/20  1503 01/14/20  1602   POCTGLUCOSE 171* 122* 141* 195* 171* 175* 233* 215* 231* 243*       Current Medications and/or Treatments Impacting Glycemic Control  Immunotherapy:    Immunosuppressants     None        Steroids:   Hormones (From admission, onward)    Start     Stop Route Frequency Ordered    01/14/20 1230  vasopressin (PITRESSIN) 0.2 Units/mL in dextrose 5 % 100 mL infusion      -- IV Continuous 01/14/20 1129        Pressors:    Autonomic Drugs (From admission, onward)    Start     Stop Route Frequency Ordered    01/14/20 1230  EPINEPHrine (ADRENALIN) 5 mg in sodium chloride 0.9% 250 mL infusion     Question Answer Comment   Titrate by: (in mcg/kg/min) 0.01    Titrate interval: (in minutes) 5    Titrate to maintain: (SBP or MAP or Cardiac Index) SBP    Maximum dose: (in mcg/kg/min) 2        -- IV Continuous 01/14/20 1129    01/14/20 1230  norepinephrine 4 mg in dextrose 5% 250 mL infusion (premix) (titrating)     Question Answer Comment   Titrate by: (in mcg/kg/min) 0.05    Titrate interval: (in minutes) 5    Titrate to maintain: (MAP or SBP) MAP    Greater than: (in mmHg) 65    Maximum dose: (in mcg/kg/min) 3        -- IV Continuous 01/14/20 1129        Hyperglycemia/Diabetes Medications:   Antihyperglycemics (From admission, onward)    Start     Stop Route  "Frequency Ordered    01/14/20 1230  insulin regular (Humulin R) 100 Units in sodium chloride 0.9% 100 mL infusion     Question:  Insulin rate changes (DO NOT MODIFY ANSWER)  Answer:  [email or web address]    -- IV Continuous 01/14/20 1129          ASSESSMENT and PLAN    * Acute coronary syndrome  Managed per primary team  Avoid hypoglycemia        Type 2 diabetes mellitus with hyperglycemia, with long-term current use of insulin  BG goal 140 - 180   BG is above goal s/p cardiac procedure. Most likely secondary to vasopressor therapy, and cardiac stress response.     Continue IV insulin infusion protocol  Requires intensive BG monitoring while on protocol     ** Please call Endocrine for any BG related issues **  ** Please notify Endocrine for any change and/or advance in diet**      Discharge Planning:     TBD. Please notify endocrinology prior to discharge. Patient will need adjustments to home insulin regimen.     Tentative:     Levemir 20 units HS.     Novolog 7 units TIDWM.     Low Dose SQ Insulin Correction Scale.    BG Monitoring AC/HS     PRN Insurance preferred diabetes testing supplies    PRN Ultra-Fine Junie Pen Needles 4mm x 32 G (5/32" x 0.23mm).     Patient request outpatient follow-up with [facility] endo clinic for continued DM management.          ROSLYN (acute kidney injury)  Caution with insulin stacking        Mixed hyperlipidemia  Managed per primary team  Condition may cause insulin resistance         Essential hypertension  Managed per primary team  Condition may cause insulin resistance             Cody Jameson NP  Endocrinology  [facility]  "

## 2020-01-14 NOTE — CONSULTS
Patient being followed by endocrine pre-operatively during hospital stay. Full note to follow today. ** Please call Endocrine for any BG related issues **

## 2020-01-15 DIAGNOSIS — Z95.1 S/P CABG (CORONARY ARTERY BYPASS GRAFT): Primary | ICD-10-CM

## 2020-01-15 LAB
ALLENS TEST: ABNORMAL
ALLENS TEST: NORMAL
ANION GAP SERPL CALC-SCNC: 11 MMOL/L (ref 8–16)
ANION GAP SERPL CALC-SCNC: 14 MMOL/L (ref 8–16)
ANION GAP SERPL CALC-SCNC: 15 MMOL/L (ref 8–16)
BASOPHILS # BLD AUTO: 0.02 K/UL (ref 0–0.2)
BASOPHILS NFR BLD: 0.1 % (ref 0–1.9)
BUN SERPL-MCNC: 31 MG/DL (ref 6–20)
BUN SERPL-MCNC: 31 MG/DL (ref 6–20)
BUN SERPL-MCNC: 34 MG/DL (ref 6–20)
BUN SERPL-MCNC: 41 MG/DL (ref 6–20)
BUN SERPL-MCNC: 43 MG/DL (ref 6–20)
CALCIUM SERPL-MCNC: 8.4 MG/DL (ref 8.7–10.5)
CALCIUM SERPL-MCNC: 8.5 MG/DL (ref 8.7–10.5)
CALCIUM SERPL-MCNC: 8.7 MG/DL (ref 8.7–10.5)
CHLORIDE SERPL-SCNC: 106 MMOL/L (ref 95–110)
CHLORIDE SERPL-SCNC: 106 MMOL/L (ref 95–110)
CHLORIDE SERPL-SCNC: 107 MMOL/L (ref 95–110)
CO2 SERPL-SCNC: 17 MMOL/L (ref 23–29)
CO2 SERPL-SCNC: 19 MMOL/L (ref 23–29)
CO2 SERPL-SCNC: 19 MMOL/L (ref 23–29)
CO2 SERPL-SCNC: 22 MMOL/L (ref 23–29)
CO2 SERPL-SCNC: 22 MMOL/L (ref 23–29)
CREAT SERPL-MCNC: 2.1 MG/DL (ref 0.5–1.4)
CREAT SERPL-MCNC: 2.1 MG/DL (ref 0.5–1.4)
CREAT SERPL-MCNC: 2.6 MG/DL (ref 0.5–1.4)
CREAT SERPL-MCNC: 2.8 MG/DL (ref 0.5–1.4)
CREAT SERPL-MCNC: 3.1 MG/DL (ref 0.5–1.4)
DELSYS: ABNORMAL
DELSYS: NORMAL
DIFFERENTIAL METHOD: ABNORMAL
EOSINOPHIL # BLD AUTO: 0 K/UL (ref 0–0.5)
EOSINOPHIL NFR BLD: 0 % (ref 0–8)
ERYTHROCYTE [DISTWIDTH] IN BLOOD BY AUTOMATED COUNT: 13.3 % (ref 11.5–14.5)
EST. GFR  (AFRICAN AMERICAN): 18.9 ML/MIN/1.73 M^2
EST. GFR  (AFRICAN AMERICAN): 21.4 ML/MIN/1.73 M^2
EST. GFR  (AFRICAN AMERICAN): 23.4 ML/MIN/1.73 M^2
EST. GFR  (AFRICAN AMERICAN): 30.3 ML/MIN/1.73 M^2
EST. GFR  (AFRICAN AMERICAN): 30.3 ML/MIN/1.73 M^2
EST. GFR  (NON AFRICAN AMERICAN): 16.4 ML/MIN/1.73 M^2
EST. GFR  (NON AFRICAN AMERICAN): 18.6 ML/MIN/1.73 M^2
EST. GFR  (NON AFRICAN AMERICAN): 20.3 ML/MIN/1.73 M^2
EST. GFR  (NON AFRICAN AMERICAN): 26.3 ML/MIN/1.73 M^2
EST. GFR  (NON AFRICAN AMERICAN): 26.3 ML/MIN/1.73 M^2
FIO2: 100
FIO2: 70
FIO2: 80
FLOW: 15
FLOW: 30
GLUCOSE SERPL-MCNC: 129 MG/DL (ref 70–110)
GLUCOSE SERPL-MCNC: 189 MG/DL (ref 70–110)
GLUCOSE SERPL-MCNC: 225 MG/DL (ref 70–110)
GLUCOSE SERPL-MCNC: 225 MG/DL (ref 70–110)
GLUCOSE SERPL-MCNC: 226 MG/DL (ref 70–110)
HCO3 UR-SCNC: 17.5 MMOL/L (ref 24–28)
HCO3 UR-SCNC: 19.3 MMOL/L (ref 24–28)
HCO3 UR-SCNC: 19.7 MMOL/L (ref 24–28)
HCO3 UR-SCNC: 19.7 MMOL/L (ref 24–28)
HCO3 UR-SCNC: 20 MMOL/L (ref 24–28)
HCO3 UR-SCNC: 20.2 MMOL/L (ref 24–28)
HCO3 UR-SCNC: 20.3 MMOL/L (ref 24–28)
HCO3 UR-SCNC: 20.4 MMOL/L (ref 24–28)
HCO3 UR-SCNC: 20.6 MMOL/L (ref 24–28)
HCO3 UR-SCNC: 20.8 MMOL/L (ref 24–28)
HCO3 UR-SCNC: 21.3 MMOL/L (ref 24–28)
HCO3 UR-SCNC: 21.4 MMOL/L (ref 24–28)
HCO3 UR-SCNC: 21.4 MMOL/L (ref 24–28)
HCO3 UR-SCNC: 21.5 MMOL/L (ref 24–28)
HCO3 UR-SCNC: 22.3 MMOL/L (ref 24–28)
HCT VFR BLD AUTO: 25.7 % (ref 37–48.5)
HCT VFR BLD CALC: 22 %PCV (ref 36–54)
HCT VFR BLD CALC: 23 %PCV (ref 36–54)
HCT VFR BLD CALC: 24 %PCV (ref 36–54)
HCT VFR BLD CALC: 25 %PCV (ref 36–54)
HCT VFR BLD CALC: 25 %PCV (ref 36–54)
HCT VFR BLD CALC: 32 %PCV (ref 36–54)
HGB BLD-MCNC: 8 G/DL (ref 12–16)
IMM GRANULOCYTES # BLD AUTO: 0.07 K/UL (ref 0–0.04)
IMM GRANULOCYTES NFR BLD AUTO: 0.4 % (ref 0–0.5)
LDH SERPL L TO P-CCNC: 1.08 MMOL/L (ref 0.36–1.25)
LDH SERPL L TO P-CCNC: 1.13 MMOL/L (ref 0.36–1.25)
LDH SERPL L TO P-CCNC: 1.23 MMOL/L (ref 0.36–1.25)
LDH SERPL L TO P-CCNC: 1.31 MMOL/L (ref 0.36–1.25)
LDH SERPL L TO P-CCNC: 1.42 MMOL/L (ref 0.36–1.25)
LDH SERPL L TO P-CCNC: 1.44 MMOL/L (ref 0.36–1.25)
LDH SERPL L TO P-CCNC: 1.46 MMOL/L (ref 0.36–1.25)
LDH SERPL L TO P-CCNC: 1.51 MMOL/L (ref 0.36–1.25)
LDH SERPL L TO P-CCNC: 1.99 MMOL/L (ref 0.36–1.25)
LDH SERPL L TO P-CCNC: 2.02 MMOL/L (ref 0.36–1.25)
LDH SERPL L TO P-CCNC: 2.14 MMOL/L (ref 0.36–1.25)
LDH SERPL L TO P-CCNC: 2.95 MMOL/L (ref 0.36–1.25)
LDH SERPL L TO P-CCNC: 3.42 MMOL/L (ref 0.36–1.25)
LDH SERPL L TO P-CCNC: 3.47 MMOL/L (ref 0.36–1.25)
LDH SERPL L TO P-CCNC: 3.73 MMOL/L (ref 0.36–1.25)
LYMPHOCYTES # BLD AUTO: 0.9 K/UL (ref 1–4.8)
LYMPHOCYTES NFR BLD: 5.4 % (ref 18–48)
MAGNESIUM SERPL-MCNC: 1.8 MG/DL (ref 1.6–2.6)
MAGNESIUM SERPL-MCNC: 1.9 MG/DL (ref 1.6–2.6)
MCH RBC QN AUTO: 31.1 PG (ref 27–31)
MCHC RBC AUTO-ENTMCNC: 31.1 G/DL (ref 32–36)
MCV RBC AUTO: 100 FL (ref 82–98)
MODE: ABNORMAL
MODE: NORMAL
MONOCYTES # BLD AUTO: 0.8 K/UL (ref 0.3–1)
MONOCYTES NFR BLD: 4.9 % (ref 4–15)
NEUTROPHILS # BLD AUTO: 14.8 K/UL (ref 1.8–7.7)
NEUTROPHILS NFR BLD: 89.2 % (ref 38–73)
NRBC BLD-RTO: 0 /100 WBC
PCO2 BLDA: 38.8 MMHG (ref 35–45)
PCO2 BLDA: 40.5 MMHG (ref 35–45)
PCO2 BLDA: 41 MMHG (ref 35–45)
PCO2 BLDA: 41.7 MMHG (ref 35–45)
PCO2 BLDA: 42.6 MMHG (ref 35–45)
PCO2 BLDA: 43.6 MMHG (ref 35–45)
PCO2 BLDA: 43.8 MMHG (ref 35–45)
PCO2 BLDA: 44.9 MMHG (ref 35–45)
PCO2 BLDA: 45 MMHG (ref 35–45)
PCO2 BLDA: 45.4 MMHG (ref 35–45)
PCO2 BLDA: 47.2 MMHG (ref 35–45)
PCO2 BLDA: 47.3 MMHG (ref 35–45)
PCO2 BLDA: 48.6 MMHG (ref 35–45)
PCO2 BLDA: 49.3 MMHG (ref 35–45)
PCO2 BLDA: 50.9 MMHG (ref 35–45)
PH SMN: 7.21 [PH] (ref 7.35–7.45)
PH SMN: 7.23 [PH] (ref 7.35–7.45)
PH SMN: 7.25 [PH] (ref 7.35–7.45)
PH SMN: 7.26 [PH] (ref 7.35–7.45)
PH SMN: 7.27 [PH] (ref 7.35–7.45)
PH SMN: 7.28 [PH] (ref 7.35–7.45)
PH SMN: 7.29 [PH] (ref 7.35–7.45)
PH SMN: 7.29 [PH] (ref 7.35–7.45)
PH SMN: 7.3 [PH] (ref 7.35–7.45)
PH SMN: 7.3 [PH] (ref 7.35–7.45)
PH SMN: 7.31 [PH] (ref 7.35–7.45)
PHOSPHATE SERPL-MCNC: 6.8 MG/DL (ref 2.7–4.5)
PLATELET # BLD AUTO: 250 K/UL (ref 150–350)
PMV BLD AUTO: 10.3 FL (ref 9.2–12.9)
PO2 BLDA: 100 MMHG (ref 80–100)
PO2 BLDA: 100 MMHG (ref 80–100)
PO2 BLDA: 109 MMHG (ref 80–100)
PO2 BLDA: 109 MMHG (ref 80–100)
PO2 BLDA: 112 MMHG (ref 80–100)
PO2 BLDA: 117 MMHG (ref 80–100)
PO2 BLDA: 118 MMHG (ref 80–100)
PO2 BLDA: 125 MMHG (ref 80–100)
PO2 BLDA: 72 MMHG (ref 80–100)
PO2 BLDA: 74 MMHG (ref 80–100)
PO2 BLDA: 90 MMHG (ref 80–100)
PO2 BLDA: 90 MMHG (ref 80–100)
PO2 BLDA: 91 MMHG (ref 80–100)
PO2 BLDA: 95 MMHG (ref 80–100)
PO2 BLDA: 98 MMHG (ref 80–100)
POC BE: -10 MMOL/L
POC BE: -4 MMOL/L
POC BE: -5 MMOL/L
POC BE: -5 MMOL/L
POC BE: -6 MMOL/L
POC BE: -7 MMOL/L
POC BE: -8 MMOL/L
POC IONIZED CALCIUM: 1.08 MMOL/L (ref 1.06–1.42)
POC IONIZED CALCIUM: 1.1 MMOL/L (ref 1.06–1.42)
POC IONIZED CALCIUM: 1.12 MMOL/L (ref 1.06–1.42)
POC IONIZED CALCIUM: 1.13 MMOL/L (ref 1.06–1.42)
POC IONIZED CALCIUM: 1.14 MMOL/L (ref 1.06–1.42)
POC IONIZED CALCIUM: 1.14 MMOL/L (ref 1.06–1.42)
POC IONIZED CALCIUM: 1.15 MMOL/L (ref 1.06–1.42)
POC IONIZED CALCIUM: 1.16 MMOL/L (ref 1.06–1.42)
POC IONIZED CALCIUM: 1.16 MMOL/L (ref 1.06–1.42)
POC IONIZED CALCIUM: 1.17 MMOL/L (ref 1.06–1.42)
POC IONIZED CALCIUM: 1.17 MMOL/L (ref 1.06–1.42)
POC IONIZED CALCIUM: 1.18 MMOL/L (ref 1.06–1.42)
POC IONIZED CALCIUM: 1.2 MMOL/L (ref 1.06–1.42)
POC SATURATED O2: 91 % (ref 95–100)
POC SATURATED O2: 91 % (ref 95–100)
POC SATURATED O2: 96 % (ref 95–100)
POC SATURATED O2: 97 % (ref 95–100)
POC SATURATED O2: 98 % (ref 95–100)
POC TCO2: 19 MMOL/L (ref 23–27)
POC TCO2: 21 MMOL/L (ref 23–27)
POC TCO2: 22 MMOL/L (ref 23–27)
POC TCO2: 23 MMOL/L (ref 23–27)
POC TCO2: 24 MMOL/L (ref 23–27)
POCT GLUCOSE: 124 MG/DL (ref 70–110)
POCT GLUCOSE: 145 MG/DL (ref 70–110)
POCT GLUCOSE: 179 MG/DL (ref 70–110)
POCT GLUCOSE: 181 MG/DL (ref 70–110)
POCT GLUCOSE: 239 MG/DL (ref 70–110)
POCT GLUCOSE: 247 MG/DL (ref 70–110)
POCT GLUCOSE: 279 MG/DL (ref 70–110)
POCT GLUCOSE: 324 MG/DL (ref 70–110)
POCT GLUCOSE: 42 MG/DL (ref 70–110)
POCT GLUCOSE: 45 MG/DL (ref 70–110)
POCT GLUCOSE: 52 MG/DL (ref 70–110)
POCT GLUCOSE: 77 MG/DL (ref 70–110)
POCT GLUCOSE: 96 MG/DL (ref 70–110)
POCT GLUCOSE: 98 MG/DL (ref 70–110)
POCT GLUCOSE: 99 MG/DL (ref 70–110)
POTASSIUM BLD-SCNC: 3.7 MMOL/L (ref 3.5–5.1)
POTASSIUM BLD-SCNC: 3.8 MMOL/L (ref 3.5–5.1)
POTASSIUM BLD-SCNC: 3.8 MMOL/L (ref 3.5–5.1)
POTASSIUM BLD-SCNC: 4 MMOL/L (ref 3.5–5.1)
POTASSIUM BLD-SCNC: 4.1 MMOL/L (ref 3.5–5.1)
POTASSIUM BLD-SCNC: 4.2 MMOL/L (ref 3.5–5.1)
POTASSIUM BLD-SCNC: 4.2 MMOL/L (ref 3.5–5.1)
POTASSIUM BLD-SCNC: 4.3 MMOL/L (ref 3.5–5.1)
POTASSIUM BLD-SCNC: 4.6 MMOL/L (ref 3.5–5.1)
POTASSIUM BLD-SCNC: 4.8 MMOL/L (ref 3.5–5.1)
POTASSIUM BLD-SCNC: 4.8 MMOL/L (ref 3.5–5.1)
POTASSIUM BLD-SCNC: 4.9 MMOL/L (ref 3.5–5.1)
POTASSIUM BLD-SCNC: 5.1 MMOL/L (ref 3.5–5.1)
POTASSIUM BLD-SCNC: 5.2 MMOL/L (ref 3.5–5.1)
POTASSIUM BLD-SCNC: 5.2 MMOL/L (ref 3.5–5.1)
POTASSIUM SERPL-SCNC: 4.2 MMOL/L (ref 3.5–5.1)
POTASSIUM SERPL-SCNC: 4.2 MMOL/L (ref 3.5–5.1)
POTASSIUM SERPL-SCNC: 4.8 MMOL/L (ref 3.5–5.1)
POTASSIUM SERPL-SCNC: 5.3 MMOL/L (ref 3.5–5.1)
POTASSIUM SERPL-SCNC: 5.5 MMOL/L (ref 3.5–5.1)
RBC # BLD AUTO: 2.57 M/UL (ref 4–5.4)
SAMPLE: ABNORMAL
SAMPLE: NORMAL
SITE: ABNORMAL
SITE: NORMAL
SODIUM BLD-SCNC: 138 MMOL/L (ref 136–145)
SODIUM BLD-SCNC: 138 MMOL/L (ref 136–145)
SODIUM BLD-SCNC: 139 MMOL/L (ref 136–145)
SODIUM BLD-SCNC: 139 MMOL/L (ref 136–145)
SODIUM BLD-SCNC: 140 MMOL/L (ref 136–145)
SODIUM BLD-SCNC: 141 MMOL/L (ref 136–145)
SODIUM BLD-SCNC: 143 MMOL/L (ref 136–145)
SODIUM BLD-SCNC: 143 MMOL/L (ref 136–145)
SODIUM BLD-SCNC: 144 MMOL/L (ref 136–145)
SODIUM SERPL-SCNC: 139 MMOL/L (ref 136–145)
SODIUM SERPL-SCNC: 139 MMOL/L (ref 136–145)
SODIUM SERPL-SCNC: 140 MMOL/L (ref 136–145)
SODIUM SERPL-SCNC: 140 MMOL/L (ref 136–145)
SODIUM SERPL-SCNC: 142 MMOL/L (ref 136–145)
SP02: 100
SP02: 70
SP02: 94
SP02: 96
WBC # BLD AUTO: 16.58 K/UL (ref 3.9–12.7)

## 2020-01-15 PROCEDURE — 83605 ASSAY OF LACTIC ACID: CPT

## 2020-01-15 PROCEDURE — 20000000 HC ICU ROOM

## 2020-01-15 PROCEDURE — 85025 COMPLETE CBC W/AUTO DIFF WBC: CPT

## 2020-01-15 PROCEDURE — 85014 HEMATOCRIT: CPT

## 2020-01-15 PROCEDURE — 82330 ASSAY OF CALCIUM: CPT

## 2020-01-15 PROCEDURE — P9045 ALBUMIN (HUMAN), 5%, 250 ML: HCPCS | Mod: JG

## 2020-01-15 PROCEDURE — 27100171 HC OXYGEN HIGH FLOW UP TO 24 HOURS

## 2020-01-15 PROCEDURE — 83735 ASSAY OF MAGNESIUM: CPT

## 2020-01-15 PROCEDURE — 25000003 PHARM REV CODE 250: Performed by: STUDENT IN AN ORGANIZED HEALTH CARE EDUCATION/TRAINING PROGRAM

## 2020-01-15 PROCEDURE — 80048 BASIC METABOLIC PNL TOTAL CA: CPT

## 2020-01-15 PROCEDURE — 99900035 HC TECH TIME PER 15 MIN (STAT)

## 2020-01-15 PROCEDURE — 94761 N-INVAS EAR/PLS OXIMETRY MLT: CPT

## 2020-01-15 PROCEDURE — 37799 UNLISTED PX VASCULAR SURGERY: CPT

## 2020-01-15 PROCEDURE — 84132 ASSAY OF SERUM POTASSIUM: CPT

## 2020-01-15 PROCEDURE — 63600175 PHARM REV CODE 636 W HCPCS

## 2020-01-15 PROCEDURE — 80048 BASIC METABOLIC PNL TOTAL CA: CPT | Mod: 91

## 2020-01-15 PROCEDURE — 82800 BLOOD PH: CPT

## 2020-01-15 PROCEDURE — C9113 INJ PANTOPRAZOLE SODIUM, VIA: HCPCS | Performed by: SURGERY

## 2020-01-15 PROCEDURE — 63600175 PHARM REV CODE 636 W HCPCS: Mod: JG | Performed by: STUDENT IN AN ORGANIZED HEALTH CARE EDUCATION/TRAINING PROGRAM

## 2020-01-15 PROCEDURE — 83735 ASSAY OF MAGNESIUM: CPT | Mod: 91

## 2020-01-15 PROCEDURE — 99233 PR SUBSEQUENT HOSPITAL CARE,LEVL III: ICD-10-PCS | Mod: ,,, | Performed by: SURGERY

## 2020-01-15 PROCEDURE — 99233 SBSQ HOSP IP/OBS HIGH 50: CPT | Mod: ,,, | Performed by: SURGERY

## 2020-01-15 PROCEDURE — 63600175 PHARM REV CODE 636 W HCPCS: Performed by: SURGERY

## 2020-01-15 PROCEDURE — 99233 PR SUBSEQUENT HOSPITAL CARE,LEVL III: ICD-10-PCS | Mod: ,,, | Performed by: NURSE PRACTITIONER

## 2020-01-15 PROCEDURE — 84100 ASSAY OF PHOSPHORUS: CPT

## 2020-01-15 PROCEDURE — 25000003 PHARM REV CODE 250: Performed by: SURGERY

## 2020-01-15 PROCEDURE — 63600175 PHARM REV CODE 636 W HCPCS: Performed by: STUDENT IN AN ORGANIZED HEALTH CARE EDUCATION/TRAINING PROGRAM

## 2020-01-15 PROCEDURE — P9045 ALBUMIN (HUMAN), 5%, 250 ML: HCPCS | Mod: JG | Performed by: STUDENT IN AN ORGANIZED HEALTH CARE EDUCATION/TRAINING PROGRAM

## 2020-01-15 PROCEDURE — 82803 BLOOD GASES ANY COMBINATION: CPT

## 2020-01-15 PROCEDURE — 84295 ASSAY OF SERUM SODIUM: CPT

## 2020-01-15 PROCEDURE — 25000003 PHARM REV CODE 250

## 2020-01-15 PROCEDURE — 27100092 HC HIGH FLOW DELIVERY CANNULA

## 2020-01-15 PROCEDURE — 99233 SBSQ HOSP IP/OBS HIGH 50: CPT | Mod: ,,, | Performed by: NURSE PRACTITIONER

## 2020-01-15 RX ORDER — ONDANSETRON 2 MG/ML
INJECTION INTRAMUSCULAR; INTRAVENOUS
Status: COMPLETED
Start: 2020-01-15 | End: 2020-01-15

## 2020-01-15 RX ORDER — NAPROXEN SODIUM 220 MG/1
81 TABLET, FILM COATED ORAL DAILY
Status: DISCONTINUED | OUTPATIENT
Start: 2020-01-16 | End: 2020-01-15

## 2020-01-15 RX ORDER — CLOPIDOGREL BISULFATE 75 MG/1
75 TABLET ORAL DAILY
Status: DISCONTINUED | OUTPATIENT
Start: 2020-01-16 | End: 2020-01-15

## 2020-01-15 RX ORDER — ALBUMIN HUMAN 50 G/1000ML
12.5 SOLUTION INTRAVENOUS ONCE
Status: COMPLETED | OUTPATIENT
Start: 2020-01-15 | End: 2020-01-15

## 2020-01-15 RX ORDER — INDOMETHACIN 25 MG/1
CAPSULE ORAL
Status: COMPLETED
Start: 2020-01-15 | End: 2020-01-15

## 2020-01-15 RX ORDER — ALBUMIN HUMAN 50 G/1000ML
SOLUTION INTRAVENOUS
Status: COMPLETED
Start: 2020-01-15 | End: 2020-01-15

## 2020-01-15 RX ORDER — SCOLOPAMINE TRANSDERMAL SYSTEM 1 MG/1
1 PATCH, EXTENDED RELEASE TRANSDERMAL
Status: DISCONTINUED | OUTPATIENT
Start: 2020-01-15 | End: 2020-02-04 | Stop reason: HOSPADM

## 2020-01-15 RX ORDER — ALBUMIN HUMAN 50 G/1000ML
25 SOLUTION INTRAVENOUS ONCE
Status: COMPLETED | OUTPATIENT
Start: 2020-01-16 | End: 2020-01-16

## 2020-01-15 RX ORDER — ONDANSETRON 2 MG/ML
4 INJECTION INTRAMUSCULAR; INTRAVENOUS ONCE
Status: COMPLETED | OUTPATIENT
Start: 2020-01-15 | End: 2020-01-15

## 2020-01-15 RX ORDER — INDOMETHACIN 25 MG/1
50 CAPSULE ORAL ONCE
Status: COMPLETED | OUTPATIENT
Start: 2020-01-15 | End: 2020-01-15

## 2020-01-15 RX ORDER — ALBUMIN HUMAN 50 G/1000ML
12.5 SOLUTION INTRAVENOUS ONCE
Status: COMPLETED | OUTPATIENT
Start: 2020-01-15 | End: 2020-01-14

## 2020-01-15 RX ORDER — GLUCAGON 1 MG
1 KIT INJECTION
Status: DISCONTINUED | OUTPATIENT
Start: 2020-01-15 | End: 2020-01-22

## 2020-01-15 RX ORDER — INSULIN ASPART 100 [IU]/ML
0-5 INJECTION, SOLUTION INTRAVENOUS; SUBCUTANEOUS EVERY 4 HOURS PRN
Status: DISCONTINUED | OUTPATIENT
Start: 2020-01-15 | End: 2020-01-22

## 2020-01-15 RX ORDER — METOCLOPRAMIDE HYDROCHLORIDE 5 MG/ML
10 INJECTION INTRAMUSCULAR; INTRAVENOUS EVERY 6 HOURS PRN
Status: DISCONTINUED | OUTPATIENT
Start: 2020-01-15 | End: 2020-02-04

## 2020-01-15 RX ADMIN — VASOPRESSIN 0.04 UNITS/MIN: 20 INJECTION INTRAVENOUS at 04:01

## 2020-01-15 RX ADMIN — EPINEPHRINE 0.1 MCG/KG/MIN: 1 INJECTION PARENTERAL at 06:01

## 2020-01-15 RX ADMIN — SODIUM BICARBONATE 50 MEQ: 84 INJECTION, SOLUTION INTRAVENOUS at 04:01

## 2020-01-15 RX ADMIN — MUPIROCIN 1 G: 20 OINTMENT TOPICAL at 10:01

## 2020-01-15 RX ADMIN — METOCLOPRAMIDE 10 MG: 5 INJECTION, SOLUTION INTRAMUSCULAR; INTRAVENOUS at 10:01

## 2020-01-15 RX ADMIN — ONDANSETRON 4 MG: 2 INJECTION INTRAMUSCULAR; INTRAVENOUS at 10:01

## 2020-01-15 RX ADMIN — AMIODARONE HYDROCHLORIDE 0.5 MG/MIN: 1.8 INJECTION, SOLUTION INTRAVENOUS at 02:01

## 2020-01-15 RX ADMIN — EPINEPHRINE 0.1 MCG/KG/MIN: 1 INJECTION PARENTERAL at 12:01

## 2020-01-15 RX ADMIN — SODIUM BICARBONATE 50 MEQ: 84 INJECTION, SOLUTION INTRAVENOUS at 03:01

## 2020-01-15 RX ADMIN — HYDROMORPHONE HYDROCHLORIDE 0.2 MG: 1 INJECTION, SOLUTION INTRAMUSCULAR; INTRAVENOUS; SUBCUTANEOUS at 06:01

## 2020-01-15 RX ADMIN — ALBUMIN (HUMAN): 12.5 SOLUTION INTRAVENOUS at 01:01

## 2020-01-15 RX ADMIN — ATORVASTATIN CALCIUM 80 MG: 20 TABLET, FILM COATED ORAL at 09:01

## 2020-01-15 RX ADMIN — HEPARIN SODIUM 5000 UNITS: 5000 INJECTION, SOLUTION INTRAVENOUS; SUBCUTANEOUS at 01:01

## 2020-01-15 RX ADMIN — ONDANSETRON 4 MG: 2 INJECTION INTRAMUSCULAR; INTRAVENOUS at 04:01

## 2020-01-15 RX ADMIN — INDOMETHACIN 50 MEQ: 25 CAPSULE ORAL at 03:01

## 2020-01-15 RX ADMIN — DEXTROSE 125 ML: 10 SOLUTION INTRAVENOUS at 08:01

## 2020-01-15 RX ADMIN — MUPIROCIN 1 G: 20 OINTMENT TOPICAL at 09:01

## 2020-01-15 RX ADMIN — INDOMETHACIN 50 MEQ: 25 CAPSULE ORAL at 04:01

## 2020-01-15 RX ADMIN — SCOPALAMINE 1 PATCH: 1 PATCH, EXTENDED RELEASE TRANSDERMAL at 10:01

## 2020-01-15 RX ADMIN — SODIUM CHLORIDE 24.2 UNITS/HR: 9 INJECTION, SOLUTION INTRAVENOUS at 12:01

## 2020-01-15 RX ADMIN — HEPARIN SODIUM 5000 UNITS: 5000 INJECTION, SOLUTION INTRAVENOUS; SUBCUTANEOUS at 06:01

## 2020-01-15 RX ADMIN — Medication 0.02 MCG/KG/MIN: at 03:01

## 2020-01-15 RX ADMIN — ACETAMINOPHEN 1000 MG: 10 INJECTION, SOLUTION INTRAVENOUS at 07:01

## 2020-01-15 RX ADMIN — ALBUMIN HUMAN 12.5 G: 50 SOLUTION INTRAVENOUS at 06:01

## 2020-01-15 RX ADMIN — HEPARIN SODIUM 5000 UNITS: 5000 INJECTION, SOLUTION INTRAVENOUS; SUBCUTANEOUS at 09:01

## 2020-01-15 RX ADMIN — HYDROMORPHONE HYDROCHLORIDE 0.2 MG: 1 INJECTION, SOLUTION INTRAMUSCULAR; INTRAVENOUS; SUBCUTANEOUS at 09:01

## 2020-01-15 RX ADMIN — SODIUM CHLORIDE 42.2 UNITS/HR: 9 INJECTION, SOLUTION INTRAVENOUS at 03:01

## 2020-01-15 RX ADMIN — ALBUMIN (HUMAN) 12.5 G: 12.5 SOLUTION INTRAVENOUS at 03:01

## 2020-01-15 RX ADMIN — CLOPIDOGREL BISULFATE 75 MG: 75 TABLET, FILM COATED ORAL at 09:01

## 2020-01-15 RX ADMIN — HYDROMORPHONE HYDROCHLORIDE 0.2 MG: 1 INJECTION, SOLUTION INTRAMUSCULAR; INTRAVENOUS; SUBCUTANEOUS at 04:01

## 2020-01-15 RX ADMIN — HYDROMORPHONE HYDROCHLORIDE 0.2 MG: 1 INJECTION, SOLUTION INTRAMUSCULAR; INTRAVENOUS; SUBCUTANEOUS at 12:01

## 2020-01-15 RX ADMIN — ASPIRIN 81 MG CHEWABLE TABLET 81 MG: 81 TABLET CHEWABLE at 09:01

## 2020-01-15 RX ADMIN — ALBUMIN (HUMAN) 12.5 G: 12.5 INJECTION, SOLUTION INTRAVENOUS at 06:01

## 2020-01-15 RX ADMIN — VASOPRESSIN 0.04 UNITS/MIN: 20 INJECTION INTRAVENOUS at 11:01

## 2020-01-15 RX ADMIN — PANTOPRAZOLE SODIUM 40 MG: 40 INJECTION, POWDER, FOR SOLUTION INTRAVENOUS at 04:01

## 2020-01-15 NOTE — ASSESSMENT & PLAN NOTE
Neuro:   - Sedation: weaned off  - Pain control: prn APAP, oxy. Prn dilaudid as needed.   - Received one time dose IV APAP 1 gram post-op.      Pulmonary:   - 30L Face Mask, O2 > 92%  - Wean from O2 as able  - Daily CXR  - ABG q1hr x 6     Cardiac:  - Drips: Levo @ 0.06, Vaso @ 0.04, Epi @ 0.1  - Wean from vasopressors as able  - Patient with pre-op EF of about 30%, intra-op EF of about 50%  - No blood products received in OR  - Goals MAP > 70, SBP < 150  - Paced at 80     Renal:   - BUN/CR: 20/0.9, today 31/2.1  - Trend BMP  - Kerns in place for close monitoring of UOP  - Cont resuscitation      Infectious Disease:   - Afebrile post-op, WBC 16.6  - Trend WBC  - Antibiotics: No abx     Hematology/Oncology:  - post-op H/H 8.7/27.4  - Trend CBC  - Monitor CT output  - Minimal bleeding during case     Endocrine:  - Endocrine consulted, keep blood glucose goals 140-180     Fluids/Electrolytes/Nutrition/GI:   - D5 NS 20 KCl @ 5  - Trend CMP  - Replace Lytes PRN   - NPO for now     Prophylaxis:  - GI: protonix daily     Dispo:  Continue ICU care, continue close monitoring of ABGs/wean from oxygen as able. Pain control.  Primary team: JI

## 2020-01-15 NOTE — PLAN OF CARE
Patient to OR today for CABG with CT surgery.  CT surgery taking over as primary since she is now in the ICU.  Please do not hesitate to contact us for any questions or concerns.  Greatly appreciate CT surgery assistance.      Lulú Macias MD  [facility] Medicine Staff  [phone] pager

## 2020-01-15 NOTE — ASSESSMENT & PLAN NOTE
"BG goal 140 - 180   BG is now below goal. Patient has fallen off of intensive IV insulin infusion overnight.     Discontinue IIP.   Start Low Dose SQ Insulin Correction Scale every 4 hours while NPO.   BG monitoring every 4 hours while NPO. Will convert to MDI or transition gtt once BG is within goal.     ** Please call Endocrine for any BG related issues **  ** Please notify Endocrine for any change and/or advance in diet**      Discharge Planning:     TBD. Please notify endocrinology prior to discharge. Patient will need adjustments to home insulin regimen.     Tentative:     Levemir 20 units HS.     Novolog 7 units TIDWM.     Low Dose SQ Insulin Correction Scale.    BG Monitoring AC/HS     PRN Insurance preferred diabetes testing supplies    PRN Ultra-Fine Junie Pen Needles 4mm x 32 G (5/32" x 0.23mm).     Patient request outpatient follow-up [place] [facility] [place] [facility] for continued DM management.        "

## 2020-01-15 NOTE — PLAN OF CARE
"      SICU PLAN OF CARE NOTE    Dx: Acute coronary syndrome    Shift Events: 250 Albumin, 1 amp bicarb    Goals of Care: Wean pressors    Neuro: AAO x4, Arouses to Voice, Follows Commands and Moves All Extremities    Vital Signs: /73 (BP Location: Right arm, Patient Position: Lying)   Pulse 80   Temp 98.1 °F (36.7 °C)   Resp 10   Ht 5' 4" (1.626 m)   Wt 101.1 kg (222 lb 14.2 oz)   LMP 09/30/2015 (Exact Date)   SpO2 100%   Breastfeeding? No   BMI 38.26 kg/m²     Respiratory: Comfort Rl    Diet: NPO    Gtts: Norepinephrine, Vasopressin, Epinephrine and MIVF    Urine Output: Urinary Catheter 5-15 cc/hour    Drains: Chest Tube, total output 90 cc /  shift     Labs/Accuchecks: BMP Q6, Accu Q4    Skin: CDI; heels, sacrum and elbows without breakdown       "

## 2020-01-15 NOTE — HOSPITAL COURSE
1/15: POD1. ON nausea without emesis. Zofran. 2L albumin, 5 amps bicarb. Clinically improving. 250 albumin running.   1/16: Increasing pressor requirements. Intubated for poor oxygenation, Left IJ trialysis placed for CRRT. Nitric added for RV support. Transfused pRBC x2 overnight.   1/19: Decreasing vent settings. Gases continue to look good. Decreasing pressor requirements. CRRT continues without issue. Paralysis discontinued.   1/20: Stayed on PS @ 8, FiO2 @ 40% most of the day. Minimal use of epi. CRRT continues.   1/21: Extubated. CRRT discontinued. CTs pulled.   1/22: Up in chair. Delirium improving with good sleep overnight. No UOP.   1/23: Brief episode of tachyarrythymia that resolved without intervention overnight. Completed HD.

## 2020-01-15 NOTE — HPI
"Ms. Connelly is a 55 y/o AAF with known medical medical issues of chronic combined systolic and diastolic HF (EF 35% Grade II DD), CAD S/P MI 2010, Angiogram 12/18/19 mid LAD 90% stenosis, mid OM2 75% stenosis, and Ostial D1 70% stenosis LVDEP 25 mmHg, PAD S/P PTA with stents BLE 2015, IDDM (A1c 10.3%), HTN, HLD, GERD, Anxiety/ Depression. Patient present with a 2 day history of nausea and with multiple episodes of non-bilious non-bloody emesis and couple of episodes of watery non-bloody diarrhea. Patient reports initially feeling HARTMAN without associated chest pain/tightness for the past week before progressively worsening to the GI symptoms described. She reports having a 7 lb weight gain (dry weight unknown) and only able to walk less than 20-30 ft before feeling short of breath. She reports that 5-10 minute rest resolves her dyspnea and she never has shortness of breath at rest. She reports feeling similar symptoms to this when she was admitted to the hospital on 12/6/19 for ADHF. On Monday 12/30 she doubled her home torsemide dosage to help reduce the excess water weight but reports that it did not help much. When he GI symptoms developed on Tuesday, she reports being unable to tolerate anything P.O. and subsequently stopped taking all of her home medications including her home insulin. What prompted her to come into the hospital was a fluttering feeling in her chest associated with some diaphoresis. She reports not remembering the symptoms she felt when she was having her MI in 2010 but  at bedside reported that she had nausea and vomiting leading up to her MI and she collapsed and lost consciousness and then later once admitted to the hospital was told she had had a heart attack. Currently the patient reports feeling nauseated, diaphoretic, a mild headache, epigastric pain, with a vague "heart burn" like feeling in her chest. She denies light headedness, chest tightness, dyspnea, palpitations, dysuria, " constipation, edema. She denies any recent sick contacts and fevers at home.     In the ED, , EKG showed sinus tachycardia without ST changes, 1st troponin 0.176, BNP 1,013, CXR - mild interstitial edema with pulmonary vascular congestion. She was seen by cardiology who suggested an ACS work up due to her recent angiogram showing vessel blockages and her being a diabetic with similar vague symptoms from last MI. They also suggested for CTS to be consulted to assess for CABG. Patient was admitted to hospital medicine Team 2 for further management and work up. Patient has now subsequently undergone one vessel CABG (LIMA to LAD) off pump. Procedure went according to plan, no blood products given. Was extubated in OR, given methadone and precedex for pain control. Came up on 0.04 levo. Upon assessment in ICU noted to be afib; started on amiodarone. Other VSS.

## 2020-01-15 NOTE — PROGRESS NOTES
"[facility]  Endocrinology  Progress Note    Admit Date: 1/2/2020     Reason for Consult: Management of T2DM, Hyperglycemia     Surgical Procedure and Date: n/a    Diabetes diagnosis year:  2006    Home Diabetes Medications:  Lantus 50 units daily and novolog 9 units with meals   Lab Results   Component Value Date    HGBA1C 9.7 (H) 01/03/2020         How often checking glucose at home? 1-3 x day   BG readings on regimen: 100-200 typically; sometimes in 300s   Hypoglycemia on the regimen?  Yes occasionally - usually overnight   Missed doses on regimen?  Yes    Diabetes Complications include:     Hyperglycemia, Hypoglycemia  and Diabetic peripheral neuropathy     Complicating diabetes co morbidities:   History of MI and CHF      HPI:   Patient is a 54 y.o. female with a diagnosis of uncontrolled type 2 DM. Also with CHF, PAD, HTN, HLD, and CAD. Patient presented with chest pain and diaphoresis. Also with nauseated, diaphoretic, a mild headache, epigastric pain, with a vague "heart burn" like feeling in her chest. Patient admitted for treatment and further workup. Endocrinology consulted for BG/ DM management.             Interval HPI:   Overnight events:  BG is now below goal. Patient had hypoglycemic event overnight.   No diet orders on file    Eating:   NPO  Nausea: No  Hypoglycemia and intervention: No  Fever: No  TPN and/or TF: No  If yes, type of TF/TPN and rate: None    /62 (BP Location: Right arm, Patient Position: Lying)   Pulse 80   Temp 98.1 °F (36.7 °C)   Resp 12   Ht 5' 4" (1.626 m)   Wt 101.1 kg (222 lb 14.2 oz)   LMP 09/30/2015 (Exact Date)   SpO2 95%   Breastfeeding? No   BMI 38.26 kg/m²      Labs Reviewed and Include    Recent Labs   Lab 01/14/20  1155 01/15/20  0300   * 225*  225*   CALCIUM 8.4* 8.7  8.7   ALBUMIN 2.6*  --    PROT 5.9*  --     140  140   K 4.6  4.6 4.2  4.2   CO2 20* 19*  19*    107  107   BUN 20 31*  31*   CREATININE 0.9 " 2.1*  2.1*   ALKPHOS 76  --    ALT 16  --    AST 16  --    BILITOT 0.6  --      Lab Results   Component Value Date    WBC 16.58 (H) 01/15/2020    HGB 8.0 (L) 01/15/2020    HCT 24 (L) 01/15/2020     (H) 01/15/2020     01/15/2020     No results for input(s): TSH, FREET4 in the last 168 hours.  Lab Results   Component Value Date    HGBA1C 9.7 (H) 01/03/2020       Nutritional status:   Body mass index is 38.26 kg/m².  Lab Results   Component Value Date    ALBUMIN 2.6 (L) 01/14/2020    ALBUMIN 2.6 (L) 01/14/2020    ALBUMIN 2.4 (L) 01/13/2020     No results found for: PREALBUMIN    Estimated Creatinine Clearance: 35.9 mL/min (A) (based on SCr of 2.1 mg/dL (H)).    Accu-Checks  Recent Labs     01/15/20  0041 01/15/20  0157 01/15/20  0209 01/15/20  0306 01/15/20  0433 01/15/20  0541 01/15/20  0727 01/15/20  0729 01/15/20  0758 01/15/20  0825   POCTGLUCOSE 324* 279* 239* 247* 145* 99 45* 52* 42* 96       Current Medications and/or Treatments Impacting Glycemic Control  Immunotherapy:    Immunosuppressants     None        Steroids:   Hormones (From admission, onward)    Start     Stop Route Frequency Ordered    01/14/20 1230  vasopressin (PITRESSIN) 0.2 Units/mL in dextrose 5 % 100 mL infusion      -- IV Continuous 01/14/20 1129        Pressors:    Autonomic Drugs (From admission, onward)    Start     Stop Route Frequency Ordered    01/14/20 1853  rocuronium 10 mg/mL injection     Note to Pharmacy:  Created by cabinet override    01/15 0659   01/14/20 1853    01/14/20 1230  EPINEPHrine (ADRENALIN) 5 mg in sodium chloride 0.9% 250 mL infusion     Question Answer Comment   Titrate by: (in mcg/kg/min) 0.01    Titrate interval: (in minutes) 5    Titrate to maintain: (SBP or MAP or Cardiac Index) SBP    Maximum dose: (in mcg/kg/min) 2        -- IV Continuous 01/14/20 1129    01/14/20 1230  norepinephrine 4 mg in dextrose 5% 250 mL infusion (premix) (titrating)     Question Answer Comment   Titrate by: (in  "mcg/kg/min) 0.05    Titrate interval: (in minutes) 5    Titrate to maintain: (MAP or SBP) MAP    Greater than: (in mmHg) 65    Maximum dose: (in mcg/kg/min) 3        -- IV Continuous 01/14/20 1129        Hyperglycemia/Diabetes Medications:   Antihyperglycemics (From admission, onward)    Start     Stop Route Frequency Ordered    01/14/20 1230  insulin regular (Humulin R) 100 Units in sodium chloride 0.9% 100 mL infusion     Question:  Insulin rate changes (DO NOT MODIFY ANSWER)  Answer:  [email or web address] [email or web address]    -- IV Continuous 01/14/20 1129          ASSESSMENT and PLAN    * Acute coronary syndrome  Managed per primary team  Avoid hypoglycemia        Type 2 diabetes mellitus with hyperglycemia, with long-term current use of insulin  BG goal 140 - 180   BG is now below goal. Patient has fallen off of intensive IV insulin infusion overnight.     Discontinue IIP.   Start Low Dose SQ Insulin Correction Scale every 4 hours while NPO.   BG monitoring every 4 hours while NPO. Will convert to MDI or transition gtt once BG is within goal.     ** Please call Endocrine for any BG related issues **  ** Please notify Endocrine for any change and/or advance in diet**      Discharge Planning:     TBD. Please notify endocrinology prior to discharge. Patient will need adjustments to home insulin regimen.     Tentative:     Levemir 20 units HS.     Novolog 7 units TIDWM.     Low Dose SQ Insulin Correction Scale.    BG Monitoring AC/HS     PRN Insurance preferred diabetes testing supplies    PRN Ultra-Fine Junie Pen Needles 4mm x 32 G (5/32" x 0.23mm).     Patient request outpatient follow-up with [facility] endo clinic for continued DM management.          ROSLYN (acute kidney injury)  Caution with insulin stacking            Cody Jameson NP  Endocrinology  [facility]  "

## 2020-01-15 NOTE — SUBJECTIVE & OBJECTIVE
Interval History/Significant Events: ON nausea. On zofran. 2L total albumin since presenting to floor. 5 amps of bicarb.     Follow-up For: Procedure(s) (LRB):  CORONARY ARTERY BYPASS GRAFT (CABG) x 1     Off Pump (N/A)    Post-Operative Day: 1 Day Post-Op    Objective:     Vital Signs (Most Recent):  Temp: 97.9 °F (36.6 °C) (01/15/20 0715)  Pulse: 80 (01/15/20 0715)  Resp: 16 (01/15/20 0715)  BP: 114/62 (01/15/20 0700)  SpO2: (!) 93 % (01/15/20 0715) Vital Signs (24h Range):  Temp:  [95.9 °F (35.5 °C)-98.1 °F (36.7 °C)] 97.9 °F (36.6 °C)  Pulse:  [57-89] 80  Resp:  [0-28] 16  SpO2:  [80 %-100 %] 93 %  BP: ()/(51-85) 114/62  Arterial Line BP: ()/(43-69) 128/55     Weight: 101.1 kg (222 lb 14.2 oz)  Body mass index is 38.26 kg/m².      Intake/Output Summary (Last 24 hours) at 1/15/2020 0732  Last data filed at 1/15/2020 0700  Gross per 24 hour   Intake 6548.93 ml   Output 1380 ml   Net 5168.93 ml       Physical Exam   Constitutional: She is oriented to person, place, and time. She appears well-developed and well-nourished.   HENT:   Head: Normocephalic and atraumatic.   Nose: Nose normal.   Eyes: Pupils are equal, round, and reactive to light. Conjunctivae and EOM are normal.   Neck: Normal range of motion. Neck supple.   Cardiovascular:   Dressing in place  Chest tubes in place  Paced at 80   Pulmonary/Chest: Effort normal and breath sounds normal.   Abdominal: Soft. Bowel sounds are normal.   Genitourinary:   Genitourinary Comments: Kerns in place   Musculoskeletal: Normal range of motion.   Neurological: She is alert and oriented to person, place, and time.   Skin: Skin is warm and dry. Capillary refill takes less than 2 seconds.   Psychiatric: She has a normal mood and affect.       Vents:  Oxygen Concentration (%): 70 (01/15/20 0700)    Lines/Drains/Airways     Central Venous Catheter Line                 Percutaneous Central Line Insertion/Assessment - double lumen  01/14/20 0725 1 day           Drain                 Urethral Catheter 01/14/20 0720 Non-latex;Temperature probe;Straight-tip 14 Fr. 1 day         Chest Tube 01/14/20 1017 1 Left Pleural 19 Fr. less than 1 day         Y Chest Tube 1 and 2 01/14/20 1018 1 Anterior Mediastinal 19 Fr. 2 Anterior Mediastinal 19 Fr. less than 1 day          Arterial Line                 Arterial Line 01/14/20 0701 Left Radial 1 day          Line                 Pacer Wires 01/14/20 1009 less than 1 day                Significant Labs:    CBC/Anemia Profile:  Recent Labs   Lab 01/14/20  1155  01/14/20  1332  01/15/20  0300 01/15/20  0412 01/15/20  0523 01/15/20  0634   WBC 13.72*  --  17.28*  --  16.58*  --   --   --    HGB 8.7*  --  8.7*  --  8.0*  --   --   --    HCT 28.1*   < > 27.4*   < > 25.7* 22* 24* 24*     --  278  --  250  --   --   --    *  --  99*  --  100*  --   --   --    RDW 13.0  --  13.0  --  13.3  --   --   --     < > = values in this interval not displayed.        Chemistries:  Recent Labs   Lab 01/14/20  0343 01/14/20  1155 01/15/20  0300    136 140  140   K 4.7 4.6  4.6 4.2  4.2    109 107  107   CO2 22* 20* 19*  19*   BUN 26* 20 31*  31*   CREATININE 1.1 0.9 2.1*  2.1*   CALCIUM 9.0 8.4* 8.7  8.7   ALBUMIN 2.6* 2.6*  --    PROT 7.0 5.9*  --    BILITOT 0.3 0.6  --    ALKPHOS 96 76  --    ALT 21 16  --    AST 20 16  --    MG  --  2.0 1.8   PHOS  --  3.6  --        ABGs:   Recent Labs   Lab 01/15/20  0634   PH 7.289*   PCO2 41.7   HCO3 20.0*   POCSATURATED 96   BE -7     Lactic Acid: No results for input(s): LACTATE in the last 48 hours.    Significant Imaging:  I have reviewed all pertinent imaging results/findings within the past 24 hours.

## 2020-01-15 NOTE — PLAN OF CARE
Plan of Care Note  Cardiothoracic Surgery    Suyapa Connelly is a 54 y.o. female with CAD, HF, DM, who underwent 1v off-pump CABG (1/14/20)    Specific issues: fluid status/renal function/UOP; respiratory function, nausea and vomiting    Plan of care for patient was discussed with ICU staff including nurses, residents, and faculty and appropriate consulting services.    Will continue to monitor patient's hemodynamics, functionality, neuro status, fluid status and renal function, and labs and will adjust medications and fluids as necessary while monitoring appropriateness for de-escalation of support and monitoring and transport to stepdown unit.    Christian Dobson MD  Cardiothoracic Surgery Fellow

## 2020-01-15 NOTE — NURSING
Report received from Anesthesia. Pt transported to SICU [address] with portable telemetrysimple face mask. Pt connected to ICU monitor Wall O2. CTS resident, charge nurse, and RT called and made aware of patient arrival. New orders received and implemented. Pt assessed, immediate needs met. Family brought to bedside, updated on the patient's current condition and PoC for remainder of shift. Family also given ICU Welcome packet and educated on visiting hours. All questions answered, emotional support provided.     Admit Skin Note:   Surgical dressings and Skin CDI. No areas of pressure injury or breakdown noted.

## 2020-01-15 NOTE — PLAN OF CARE
01/15/20 1533   Discharge Reassessment   Assessment Type Discharge Planning Reassessment   Provided patient/caregiver education on the expected discharge date and the discharge plan Yes   Do you have any problems affording any of your prescribed medications? No   Discharge Plan A Home Health   Discharge Plan B Home with family   DME Needed Upon Discharge  other (see comments)  (TBD)   Post-Acute Status   Discharge Delays None known at this time       Elmira Calderon MPH, RN, CM  Ext. [phone]

## 2020-01-15 NOTE — PT/OT/SLP PROGRESS
Physical Therapy      Patient Name:  Suyapa Connelly   MRN:  [id number]    PT orders received and acknowledged. Pt with unstable oxygen status this date, additionally with episodes of emesis. PT to hold initiation of services today. Will follow-up at next possible date as pt is medically appropriate.    Fely Pappas, PT

## 2020-01-15 NOTE — SUBJECTIVE & OBJECTIVE
"Interval HPI:   Overnight events:  BG is now below goal. Patient had hypoglycemic event overnight.   No diet orders on file    Eating:   NPO  Nausea: No  Hypoglycemia and intervention: No  Fever: No  TPN and/or TF: No  If yes, type of TF/TPN and rate: None    /62 (BP Location: Right arm, Patient Position: Lying)   Pulse 80   Temp 98.1 °F (36.7 °C)   Resp 12   Ht 5' 4" (1.626 m)   Wt 101.1 kg (222 lb 14.2 oz)   LMP 09/30/2015 (Exact Date)   SpO2 95%   Breastfeeding? No   BMI 38.26 kg/m²     Labs Reviewed and Include    Recent Labs   Lab 01/14/20  1155 01/15/20  0300   * 225*  225*   CALCIUM 8.4* 8.7  8.7   ALBUMIN 2.6*  --    PROT 5.9*  --     140  140   K 4.6  4.6 4.2  4.2   CO2 20* 19*  19*    107  107   BUN 20 31*  31*   CREATININE 0.9 2.1*  2.1*   ALKPHOS 76  --    ALT 16  --    AST 16  --    BILITOT 0.6  --      Lab Results   Component Value Date    WBC 16.58 (H) 01/15/2020    HGB 8.0 (L) 01/15/2020    HCT 24 (L) 01/15/2020     (H) 01/15/2020     01/15/2020     No results for input(s): TSH, FREET4 in the last 168 hours.  Lab Results   Component Value Date    HGBA1C 9.7 (H) 01/03/2020       Nutritional status:   Body mass index is 38.26 kg/m².  Lab Results   Component Value Date    ALBUMIN 2.6 (L) 01/14/2020    ALBUMIN 2.6 (L) 01/14/2020    ALBUMIN 2.4 (L) 01/13/2020     No results found for: PREALBUMIN    Estimated Creatinine Clearance: 35.9 mL/min (A) (based on SCr of 2.1 mg/dL (H)).    Accu-Checks  Recent Labs     01/15/20  0041 01/15/20  0157 01/15/20  0209 01/15/20  0306 01/15/20  0433 01/15/20  0541 01/15/20  0727 01/15/20  0729 01/15/20  0758 01/15/20  0825   POCTGLUCOSE 324* 279* 239* 247* 145* 99 45* 52* 42* 96       Current Medications and/or Treatments Impacting Glycemic Control  Immunotherapy:    Immunosuppressants     None        Steroids:   Hormones (From admission, onward)    Start     Stop Route Frequency Ordered    01/14/20 1230  " vasopressin (PITRESSIN) 0.2 Units/mL in dextrose 5 % 100 mL infusion      -- IV Continuous 01/14/20 1129        Pressors:    Autonomic Drugs (From admission, onward)    Start     Stop Route Frequency Ordered    01/14/20 1853  rocuronium 10 mg/mL injection     Note to Pharmacy:  Created by cabinet override    01/15 0659   01/14/20 1853    01/14/20 1230  EPINEPHrine (ADRENALIN) 5 mg in sodium chloride 0.9% 250 mL infusion     Question Answer Comment   Titrate by: (in mcg/kg/min) 0.01    Titrate interval: (in minutes) 5    Titrate to maintain: (SBP or MAP or Cardiac Index) SBP    Maximum dose: (in mcg/kg/min) 2        -- IV Continuous 01/14/20 1129 01/14/20 1230  norepinephrine 4 mg in dextrose 5% 250 mL infusion (premix) (titrating)     Question Answer Comment   Titrate by: (in mcg/kg/min) 0.05    Titrate interval: (in minutes) 5    Titrate to maintain: (MAP or SBP) MAP    Greater than: (in mmHg) 65    Maximum dose: (in mcg/kg/min) 3        -- IV Continuous 01/14/20 1129        Hyperglycemia/Diabetes Medications:   Antihyperglycemics (From admission, onward)    Start     Stop Route Frequency Ordered    01/14/20 1230  insulin regular (Humulin R) 100 Units in sodium chloride 0.9% 100 mL infusion     Question:  Insulin rate changes (DO NOT MODIFY ANSWER)  Answer:  [email or web address] [email or web address] [email or web address]    -- IV Continuous 01/14/20 1129

## 2020-01-15 NOTE — ANESTHESIA POSTPROCEDURE EVALUATION
Anesthesia Post Evaluation    Patient: Suyapa Connelly    Procedure(s) Performed: Procedure(s) (LRB):  CORONARY ARTERY BYPASS GRAFT (CABG) x 1     Off Pump (N/A)    Final Anesthesia Type: general    Patient location during evaluation: ICU  Patient participation: Yes- Able to Participate  Level of consciousness: awake and alert and oriented  Post-procedure vital signs: reviewed and stable  Pain management: adequate  Airway patency: patent    PONV status at discharge: nausea (controlled)  Anesthetic complications: no      Cardiovascular status: blood pressure returned to baseline and hemodynamically stable  Respiratory status: unassisted and spontaneous ventilation  Hydration status: euvolemic  Follow-up not needed.          Vitals Value Taken Time   /72 1/15/2020  1:01 PM   Temp 37 °C (98.6 °F) 1/15/2020  1:57 PM   Pulse 80 1/15/2020  1:57 PM   Resp 11 1/15/2020  1:57 PM   SpO2 90 % 1/15/2020  1:57 PM   Vitals shown include unvalidated device data.      No case tracking events are documented in the log.      Pain/Shannan Score: Pain Rating Prior to Med Admin: 6 (1/15/2020 12:44 PM)  Pain Rating Post Med Admin: 0 (1/14/2020  3:30 PM)

## 2020-01-15 NOTE — PROGRESS NOTES
[facility]  Critical Care - Surgery  Progress Note    Patient Name: Suyapa Connelly  MRN: [id number]  Admission Date: 1/2/2020  Hospital Length of Stay: 13 days  Code Status: Prior  Attending Provider: Huang Altamirano MD  Primary Care Provider: Erlinda Rahman NP   Principal Problem: Acute coronary syndrome    Subjective:     Hospital/ICU Course:  1/15: POD1. ON nausea without emesis. Zofran. 2L albumin, 5 amps bicarb. Clinically improving. 250 albumin running.     Interval History/Significant Events: ON nausea. On zofran. 2L total albumin since presenting to floor. 5 amps of bicarb.     Follow-up For: Procedure(s) (LRB):  CORONARY ARTERY BYPASS GRAFT (CABG) x 1     Off Pump (N/A)    Post-Operative Day: 1 Day Post-Op    Objective:     Vital Signs (Most Recent):  Temp: 97.9 °F (36.6 °C) (01/15/20 0715)  Pulse: 80 (01/15/20 0715)  Resp: 16 (01/15/20 0715)  BP: 114/62 (01/15/20 0700)  SpO2: (!) 93 % (01/15/20 0715) Vital Signs (24h Range):  Temp:  [95.9 °F (35.5 °C)-98.1 °F (36.7 °C)] 97.9 °F (36.6 °C)  Pulse:  [57-89] 80  Resp:  [0-28] 16  SpO2:  [80 %-100 %] 93 %  BP: ()/(51-85) 114/62  Arterial Line BP: ()/(43-69) 128/55     Weight: 101.1 kg (222 lb 14.2 oz)  Body mass index is 38.26 kg/m².      Intake/Output Summary (Last 24 hours) at 1/15/2020 0732  Last data filed at 1/15/2020 0700  Gross per 24 hour   Intake 6548.93 ml   Output 1380 ml   Net 5168.93 ml       Physical Exam   Constitutional: She is oriented to person, place, and time. She appears well-developed and well-nourished.   HENT:   Head: Normocephalic and atraumatic.   Nose: Nose normal.   Eyes: Pupils are equal, round, and reactive to light. Conjunctivae and EOM are normal.   Neck: Normal range of motion. Neck supple.   Cardiovascular:   Dressing in place  Chest tubes in place  Paced at 80   Pulmonary/Chest: Effort normal and breath sounds normal.   Abdominal: Soft. Bowel sounds are normal.   Genitourinary:    Genitourinary Comments: Kerns in place   Musculoskeletal: Normal range of motion.   Neurological: She is alert and oriented to person, place, and time.   Skin: Skin is warm and dry. Capillary refill takes less than 2 seconds.   Psychiatric: She has a normal mood and affect.       Vents:  Oxygen Concentration (%): 70 (01/15/20 0700)    Lines/Drains/Airways     Central Venous Catheter Line                 Percutaneous Central Line Insertion/Assessment - double lumen  01/14/20 0725 1 day          Drain                 Urethral Catheter 01/14/20 0720 Non-latex;Temperature probe;Straight-tip 14 Fr. 1 day         Chest Tube 01/14/20 1017 1 Left Pleural 19 Fr. less than 1 day         Y Chest Tube 1 and 2 01/14/20 1018 1 Anterior Mediastinal 19 Fr. 2 Anterior Mediastinal 19 Fr. less than 1 day          Arterial Line                 Arterial Line 01/14/20 0701 Left Radial 1 day          Line                 Pacer Wires 01/14/20 1009 less than 1 day                Significant Labs:    CBC/Anemia Profile:  Recent Labs   Lab 01/14/20  1155  01/14/20  1332  01/15/20  0300 01/15/20  0412 01/15/20  0523 01/15/20  0634   WBC 13.72*  --  17.28*  --  16.58*  --   --   --    HGB 8.7*  --  8.7*  --  8.0*  --   --   --    HCT 28.1*   < > 27.4*   < > 25.7* 22* 24* 24*     --  278  --  250  --   --   --    *  --  99*  --  100*  --   --   --    RDW 13.0  --  13.0  --  13.3  --   --   --     < > = values in this interval not displayed.        Chemistries:  Recent Labs   Lab 01/14/20  0343 01/14/20  1155 01/15/20  0300    136 140  140   K 4.7 4.6  4.6 4.2  4.2    109 107  107   CO2 22* 20* 19*  19*   BUN 26* 20 31*  31*   CREATININE 1.1 0.9 2.1*  2.1*   CALCIUM 9.0 8.4* 8.7  8.7   ALBUMIN 2.6* 2.6*  --    PROT 7.0 5.9*  --    BILITOT 0.3 0.6  --    ALKPHOS 96 76  --    ALT 21 16  --    AST 20 16  --    MG  --  2.0 1.8   PHOS  --  3.6  --        ABGs:   Recent Labs   Lab 01/15/20  0634   PH 7.289*   PCO2  41.7   HCO3 20.0*   POCSATURATED 96   BE -7     Lactic Acid: No results for input(s): LACTATE in the last 48 hours.    Significant Imaging:  I have reviewed all pertinent imaging results/findings within the past 24 hours.    Assessment/Plan:     Acute on chronic combined systolic (congestive) and diastolic (congestive) heart failure  Neuro:   - Sedation: weaned off  - Pain control: prn APAP, oxy. Prn dilaudid as needed.   - Received one time dose IV APAP 1 gram post-op.      Pulmonary:   - 30L Face Mask, O2 > 92%  - Wean from O2 as able  - Daily CXR  - ABG q1hr x 6     Cardiac:  - Drips: Levo @ 0.06, Vaso @ 0.04, Epi @ 0.1  - Wean from vasopressors as able  - Patient with pre-op EF of about 30%, intra-op EF of about 50%  - No blood products received in OR  - Goals MAP > 70, SBP < 150  - Paced at 80     Renal:   - BUN/CR: 20/0.9, today 31/2.1  - Trend BMP  - Kerns in place for close monitoring of UOP  - Cont resuscitation      Infectious Disease:   - Afebrile post-op, WBC 16.6  - Trend WBC  - Antibiotics: No abx     Hematology/Oncology:  - post-op H/H 8.7/27.4  - Trend CBC  - Monitor CT output  - Minimal bleeding during case     Endocrine:  - Endocrine consulted, keep blood glucose goals 140-180     Fluids/Electrolytes/Nutrition/GI:   - D5 NS 20 KCl @ 5  - Trend CMP  - Replace Lytes PRN   - NPO for now     Prophylaxis:  - GI: protonix daily     Dispo:  Continue ICU care, continue close monitoring of ABGs/wean from oxygen as able. Pain control.  Primary team: CTS       Critical care was time spent personally by me on the following activities: development of treatment plan with patient or surrogate and bedside caregivers, discussions with consultants, evaluation of patient's response to treatment, examination of patient, ordering and performing treatments and interventions, ordering and review of laboratory studies, ordering and review of radiographic studies, pulse oximetry, re-evaluation of patient's condition.  This  critical care time did not overlap with that of any other provider or involve time for any procedures.     Aubrey Doan MD  Critical Care - Surgery  [facility]

## 2020-01-15 NOTE — NURSING
Notified MD of patients' heart rate from 70-80's to mid 50's. Urine output decreased from 20-25mL/hr to 10mL/hr. Patient complaining of chest pain and nausea. PRN IV pain meds given when blood pressure is within desired range. 250mL of albumin ordered to be infused over 2 hours.

## 2020-01-15 NOTE — PT/OT/SLP PROGRESS
Occupational Therapy      Patient Name:  Suyapa Connelly   MRN:  [id number]    OT orders received and acknowledged. Patient with unstable oxygen status this date, additionally with episodes of emesis. OT to hold evaluation today. Will follow-up at next possible date as patient is medically appropriate.    Vivian Morales, OT  1/15/2020

## 2020-01-15 NOTE — PLAN OF CARE
"      SICU PLAN OF CARE NOTE    Dx: Acute coronary syndrome    Vital Signs: BP (!) 140/85 (BP Location: Right arm, Patient Position: Lying)   Pulse 81   Temp 97.7 °F (36.5 °C)   Resp 18   Ht 5' 4" (1.626 m)   Wt 98.6 kg (217 lb 6 oz)   LMP 09/30/2015 (Exact Date)   SpO2 99%   Breastfeeding? No   BMI 37.31 kg/m²     Neuro: AAO x4, Arouses to Voice, Follows Commands and Moves All Extremities    Respiratory: HFNC & Non-Rebreather     Diet: NPO    Gtts: Insulin, Vasopressin, Epinephrine, Amiodarone and MIVF    Urine Output: Urinary Catheter 840 cc/shift    Drains:  Chest Tube, total output 160 cc /  shift    Shift Events:   Patient came from OR extubated and on 10L simple face mask. Patient drowsy, but aroused to voice and followed commands however would fall back asleep in mid conversation. Gtt upon arrival: Levo at 0.04 mcg/kg/min and Insulin.     Shortly after arrival O2 demand increased as well as pressor support; Patient placed on Non-rebreather (Q1 hr ABGs), Vaso and Epi added on, and Levo weaned off (see flow sheet). Patient also noted to be in Afib upon arrival, Amio bolus loading dose and gtt administered.     Patient received a total of 1250ml Albumin since arrival and a total of 3 amps of bicarb.     Goals of Care:   New MAP goals > 75  SBP goals < 130    "

## 2020-01-16 LAB
ABO + RH BLD: NORMAL
ALBUMIN SERPL BCP-MCNC: 3.7 G/DL (ref 3.5–5.2)
ALBUMIN SERPL BCP-MCNC: 4 G/DL (ref 3.5–5.2)
ALLENS TEST: ABNORMAL
ALP SERPL-CCNC: 104 U/L (ref 55–135)
ALP SERPL-CCNC: 94 U/L (ref 55–135)
ALP SERPL-CCNC: 98 U/L (ref 55–135)
ALT SERPL W/O P-5'-P-CCNC: 1301 U/L (ref 10–44)
ALT SERPL W/O P-5'-P-CCNC: 1304 U/L (ref 10–44)
ALT SERPL W/O P-5'-P-CCNC: 1329 U/L (ref 10–44)
ANION GAP SERPL CALC-SCNC: 13 MMOL/L (ref 8–16)
ANION GAP SERPL CALC-SCNC: 15 MMOL/L (ref 8–16)
ANION GAP SERPL CALC-SCNC: 16 MMOL/L (ref 8–16)
AST SERPL-CCNC: 1576 U/L (ref 10–40)
AST SERPL-CCNC: 1990 U/L (ref 10–40)
AST SERPL-CCNC: 2098 U/L (ref 10–40)
BASOPHILS # BLD AUTO: 0.02 K/UL (ref 0–0.2)
BASOPHILS # BLD AUTO: 0.03 K/UL (ref 0–0.2)
BASOPHILS NFR BLD: 0.1 % (ref 0–1.9)
BASOPHILS NFR BLD: 0.1 % (ref 0–1.9)
BILIRUB SERPL-MCNC: 1.2 MG/DL (ref 0.1–1)
BILIRUB SERPL-MCNC: 1.2 MG/DL (ref 0.1–1)
BILIRUB SERPL-MCNC: 1.3 MG/DL (ref 0.1–1)
BLD GP AB SCN CELLS X3 SERPL QL: NORMAL
BLD PROD TYP BPU: NORMAL
BLD PROD TYP BPU: NORMAL
BLOOD UNIT EXPIRATION DATE: NORMAL
BLOOD UNIT EXPIRATION DATE: NORMAL
BLOOD UNIT TYPE CODE: 5100
BLOOD UNIT TYPE CODE: 5100
BLOOD UNIT TYPE: NORMAL
BLOOD UNIT TYPE: NORMAL
BUN SERPL-MCNC: 40 MG/DL (ref 6–20)
BUN SERPL-MCNC: 40 MG/DL (ref 6–20)
BUN SERPL-MCNC: 44 MG/DL (ref 6–20)
BUN SERPL-MCNC: 47 MG/DL (ref 6–20)
BUN SERPL-MCNC: 47 MG/DL (ref 6–20)
BUN SERPL-MCNC: 57 MG/DL (ref 6–20)
BUN SERPL-MCNC: 57 MG/DL (ref 6–20)
BUN SERPL-MCNC: 62 MG/DL (ref 6–20)
CALCIUM SERPL-MCNC: 8.2 MG/DL (ref 8.7–10.5)
CALCIUM SERPL-MCNC: 8.2 MG/DL (ref 8.7–10.5)
CALCIUM SERPL-MCNC: 8.3 MG/DL (ref 8.7–10.5)
CALCIUM SERPL-MCNC: 8.3 MG/DL (ref 8.7–10.5)
CALCIUM SERPL-MCNC: 8.4 MG/DL (ref 8.7–10.5)
CALCIUM SERPL-MCNC: 8.5 MG/DL (ref 8.7–10.5)
CALCIUM SERPL-MCNC: 8.5 MG/DL (ref 8.7–10.5)
CALCIUM SERPL-MCNC: 8.6 MG/DL (ref 8.7–10.5)
CHLORIDE SERPL-SCNC: 103 MMOL/L (ref 95–110)
CHLORIDE SERPL-SCNC: 104 MMOL/L (ref 95–110)
CO2 SERPL-SCNC: 18 MMOL/L (ref 23–29)
CO2 SERPL-SCNC: 19 MMOL/L (ref 23–29)
CO2 SERPL-SCNC: 20 MMOL/L (ref 23–29)
CO2 SERPL-SCNC: 20 MMOL/L (ref 23–29)
CODING SYSTEM: NORMAL
CODING SYSTEM: NORMAL
CREAT SERPL-MCNC: 2.9 MG/DL (ref 0.5–1.4)
CREAT SERPL-MCNC: 2.9 MG/DL (ref 0.5–1.4)
CREAT SERPL-MCNC: 3.3 MG/DL (ref 0.5–1.4)
CREAT SERPL-MCNC: 3.8 MG/DL (ref 0.5–1.4)
CREAT SERPL-MCNC: 3.8 MG/DL (ref 0.5–1.4)
CREAT SERPL-MCNC: 4.3 MG/DL (ref 0.5–1.4)
CREAT SERPL-MCNC: 4.3 MG/DL (ref 0.5–1.4)
CREAT SERPL-MCNC: 4.4 MG/DL (ref 0.5–1.4)
DELSYS: ABNORMAL
DIFFERENTIAL METHOD: ABNORMAL
DIFFERENTIAL METHOD: ABNORMAL
DISPENSE STATUS: NORMAL
DISPENSE STATUS: NORMAL
EOSINOPHIL # BLD AUTO: 0 K/UL (ref 0–0.5)
EOSINOPHIL # BLD AUTO: 0 K/UL (ref 0–0.5)
EOSINOPHIL NFR BLD: 0 % (ref 0–8)
EOSINOPHIL NFR BLD: 0 % (ref 0–8)
ERYTHROCYTE [DISTWIDTH] IN BLOOD BY AUTOMATED COUNT: 14 % (ref 11.5–14.5)
ERYTHROCYTE [DISTWIDTH] IN BLOOD BY AUTOMATED COUNT: 14.1 % (ref 11.5–14.5)
ERYTHROCYTE [SEDIMENTATION RATE] IN BLOOD BY WESTERGREN METHOD: 16 MM/H
EST. GFR  (AFRICAN AMERICAN): 12.4 ML/MIN/1.73 M^2
EST. GFR  (AFRICAN AMERICAN): 12.7 ML/MIN/1.73 M^2
EST. GFR  (AFRICAN AMERICAN): 12.7 ML/MIN/1.73 M^2
EST. GFR  (AFRICAN AMERICAN): 14.8 ML/MIN/1.73 M^2
EST. GFR  (AFRICAN AMERICAN): 14.8 ML/MIN/1.73 M^2
EST. GFR  (AFRICAN AMERICAN): 17.5 ML/MIN/1.73 M^2
EST. GFR  (AFRICAN AMERICAN): 20.5 ML/MIN/1.73 M^2
EST. GFR  (AFRICAN AMERICAN): 20.5 ML/MIN/1.73 M^2
EST. GFR  (NON AFRICAN AMERICAN): 10.8 ML/MIN/1.73 M^2
EST. GFR  (NON AFRICAN AMERICAN): 11.1 ML/MIN/1.73 M^2
EST. GFR  (NON AFRICAN AMERICAN): 11.1 ML/MIN/1.73 M^2
EST. GFR  (NON AFRICAN AMERICAN): 12.8 ML/MIN/1.73 M^2
EST. GFR  (NON AFRICAN AMERICAN): 12.8 ML/MIN/1.73 M^2
EST. GFR  (NON AFRICAN AMERICAN): 15.2 ML/MIN/1.73 M^2
EST. GFR  (NON AFRICAN AMERICAN): 17.8 ML/MIN/1.73 M^2
EST. GFR  (NON AFRICAN AMERICAN): 17.8 ML/MIN/1.73 M^2
FIO2: 100
FIO2: 80
FLOW: 30
FLOW: 45
GLUCOSE SERPL-MCNC: 223 MG/DL (ref 70–110)
GLUCOSE SERPL-MCNC: 223 MG/DL (ref 70–110)
GLUCOSE SERPL-MCNC: 255 MG/DL (ref 70–110)
GLUCOSE SERPL-MCNC: 270 MG/DL (ref 70–110)
GLUCOSE SERPL-MCNC: 270 MG/DL (ref 70–110)
GLUCOSE SERPL-MCNC: 294 MG/DL (ref 70–110)
GLUCOSE SERPL-MCNC: 294 MG/DL (ref 70–110)
GLUCOSE SERPL-MCNC: 308 MG/DL (ref 70–110)
HCO3 UR-SCNC: 17.2 MMOL/L (ref 24–28)
HCO3 UR-SCNC: 18.3 MMOL/L (ref 24–28)
HCO3 UR-SCNC: 18.5 MMOL/L (ref 24–28)
HCO3 UR-SCNC: 18.7 MMOL/L (ref 24–28)
HCO3 UR-SCNC: 18.8 MMOL/L (ref 24–28)
HCO3 UR-SCNC: 19.1 MMOL/L (ref 24–28)
HCO3 UR-SCNC: 19.1 MMOL/L (ref 24–28)
HCO3 UR-SCNC: 19.3 MMOL/L (ref 24–28)
HCO3 UR-SCNC: 19.5 MMOL/L (ref 24–28)
HCO3 UR-SCNC: 19.6 MMOL/L (ref 24–28)
HCO3 UR-SCNC: 19.9 MMOL/L (ref 24–28)
HCO3 UR-SCNC: 20 MMOL/L (ref 24–28)
HCO3 UR-SCNC: 20.1 MMOL/L (ref 24–28)
HCO3 UR-SCNC: 20.4 MMOL/L (ref 24–28)
HCO3 UR-SCNC: 21.7 MMOL/L (ref 24–28)
HCT VFR BLD AUTO: 23.8 % (ref 37–48.5)
HCT VFR BLD AUTO: 24.5 % (ref 37–48.5)
HCT VFR BLD CALC: 22 %PCV (ref 36–54)
HCT VFR BLD CALC: 22 %PCV (ref 36–54)
HCT VFR BLD CALC: 23 %PCV (ref 36–54)
HCT VFR BLD CALC: 24 %PCV (ref 36–54)
HCT VFR BLD CALC: 26 %PCV (ref 36–54)
HCT VFR BLD CALC: 29 %PCV (ref 36–54)
HCT VFR BLD CALC: 36 %PCV (ref 36–54)
HGB BLD-MCNC: 7.6 G/DL (ref 12–16)
HGB BLD-MCNC: 7.7 G/DL (ref 12–16)
IMM GRANULOCYTES # BLD AUTO: 0.14 K/UL (ref 0–0.04)
IMM GRANULOCYTES # BLD AUTO: 0.2 K/UL (ref 0–0.04)
IMM GRANULOCYTES NFR BLD AUTO: 0.7 % (ref 0–0.5)
IMM GRANULOCYTES NFR BLD AUTO: 1.1 % (ref 0–0.5)
INR PPP: 1.3 (ref 0.8–1.2)
INR PPP: 1.5 (ref 0.8–1.2)
LDH SERPL L TO P-CCNC: 1.45 MMOL/L (ref 0.36–1.25)
LDH SERPL L TO P-CCNC: 1.63 MMOL/L (ref 0.36–1.25)
LDH SERPL L TO P-CCNC: 1.69 MMOL/L (ref 0.36–1.25)
LDH SERPL L TO P-CCNC: 1.72 MMOL/L (ref 0.36–1.25)
LDH SERPL L TO P-CCNC: 1.72 MMOL/L (ref 0.36–1.25)
LDH SERPL L TO P-CCNC: 1.83 MMOL/L (ref 0.36–1.25)
LDH SERPL L TO P-CCNC: 1.84 MMOL/L (ref 0.36–1.25)
LDH SERPL L TO P-CCNC: 1.88 MMOL/L (ref 0.36–1.25)
LDH SERPL L TO P-CCNC: 1.9 MMOL/L (ref 0.36–1.25)
LDH SERPL L TO P-CCNC: 1.97 MMOL/L (ref 0.36–1.25)
LDH SERPL L TO P-CCNC: 2 MMOL/L (ref 0.36–1.25)
LDH SERPL L TO P-CCNC: 2.04 MMOL/L (ref 0.36–1.25)
LDH SERPL L TO P-CCNC: 2.06 MMOL/L (ref 0.36–1.25)
LDH SERPL L TO P-CCNC: 2.29 MMOL/L (ref 0.36–1.25)
LYMPHOCYTES # BLD AUTO: 0.8 K/UL (ref 1–4.8)
LYMPHOCYTES # BLD AUTO: 0.8 K/UL (ref 1–4.8)
LYMPHOCYTES NFR BLD: 3.7 % (ref 18–48)
LYMPHOCYTES NFR BLD: 4.1 % (ref 18–48)
MAGNESIUM SERPL-MCNC: 1.8 MG/DL (ref 1.6–2.6)
MAGNESIUM SERPL-MCNC: 1.9 MG/DL (ref 1.6–2.6)
MAGNESIUM SERPL-MCNC: 2 MG/DL (ref 1.6–2.6)
MCH RBC QN AUTO: 31.1 PG (ref 27–31)
MCH RBC QN AUTO: 32 PG (ref 27–31)
MCHC RBC AUTO-ENTMCNC: 31 G/DL (ref 32–36)
MCHC RBC AUTO-ENTMCNC: 32.4 G/DL (ref 32–36)
MCV RBC AUTO: 100 FL (ref 82–98)
MCV RBC AUTO: 99 FL (ref 82–98)
MIN VOL: 13.1
MIN VOL: 13.1
MODE: ABNORMAL
MONOCYTES # BLD AUTO: 1.3 K/UL (ref 0.3–1)
MONOCYTES # BLD AUTO: 1.5 K/UL (ref 0.3–1)
MONOCYTES NFR BLD: 6.7 % (ref 4–15)
MONOCYTES NFR BLD: 7.6 % (ref 4–15)
NEUTROPHILS # BLD AUTO: 16.3 K/UL (ref 1.8–7.7)
NEUTROPHILS # BLD AUTO: 17.7 K/UL (ref 1.8–7.7)
NEUTROPHILS NFR BLD: 87.9 % (ref 38–73)
NEUTROPHILS NFR BLD: 88 % (ref 38–73)
NRBC BLD-RTO: 0 /100 WBC
NRBC BLD-RTO: 0 /100 WBC
PCO2 BLDA: 35.2 MMHG (ref 35–45)
PCO2 BLDA: 35.6 MMHG (ref 35–45)
PCO2 BLDA: 37.3 MMHG (ref 35–45)
PCO2 BLDA: 38.3 MMHG (ref 35–45)
PCO2 BLDA: 38.4 MMHG (ref 35–45)
PCO2 BLDA: 38.4 MMHG (ref 35–45)
PCO2 BLDA: 39 MMHG (ref 35–45)
PCO2 BLDA: 39.4 MMHG (ref 35–45)
PCO2 BLDA: 39.4 MMHG (ref 35–45)
PCO2 BLDA: 39.6 MMHG (ref 35–45)
PCO2 BLDA: 40.4 MMHG (ref 35–45)
PCO2 BLDA: 41 MMHG (ref 35–45)
PCO2 BLDA: 41 MMHG (ref 35–45)
PCO2 BLDA: 41.9 MMHG (ref 35–45)
PCO2 BLDA: 46 MMHG (ref 35–45)
PEEP: 10
PEEP: 5
PEEP: 5
PEEP: 8
PEEP: 8
PH SMN: 7.25 [PH] (ref 7.35–7.45)
PH SMN: 7.27 [PH] (ref 7.35–7.45)
PH SMN: 7.29 [PH] (ref 7.35–7.45)
PH SMN: 7.3 [PH] (ref 7.35–7.45)
PH SMN: 7.3 [PH] (ref 7.35–7.45)
PH SMN: 7.31 [PH] (ref 7.35–7.45)
PH SMN: 7.31 [PH] (ref 7.35–7.45)
PH SMN: 7.32 [PH] (ref 7.35–7.45)
PH SMN: 7.32 [PH] (ref 7.35–7.45)
PH SMN: 7.33 [PH] (ref 7.35–7.45)
PH SMN: 7.33 [PH] (ref 7.35–7.45)
PHOSPHATE SERPL-MCNC: 5.4 MG/DL (ref 2.7–4.5)
PHOSPHATE SERPL-MCNC: 8.1 MG/DL (ref 2.7–4.5)
PHOSPHATE SERPL-MCNC: 8.3 MG/DL (ref 2.7–4.5)
PIP: 28
PIP: 28
PLATELET # BLD AUTO: 166 K/UL (ref 150–350)
PLATELET # BLD AUTO: 211 K/UL (ref 150–350)
PMV BLD AUTO: 10.8 FL (ref 9.2–12.9)
PMV BLD AUTO: 11 FL (ref 9.2–12.9)
PO2 BLDA: 57 MMHG (ref 80–100)
PO2 BLDA: 61 MMHG (ref 80–100)
PO2 BLDA: 62 MMHG (ref 80–100)
PO2 BLDA: 64 MMHG (ref 80–100)
PO2 BLDA: 65 MMHG (ref 80–100)
PO2 BLDA: 66 MMHG (ref 80–100)
PO2 BLDA: 68 MMHG (ref 80–100)
PO2 BLDA: 69 MMHG (ref 80–100)
PO2 BLDA: 69 MMHG (ref 80–100)
PO2 BLDA: 70 MMHG (ref 80–100)
PO2 BLDA: 76 MMHG (ref 80–100)
PO2 BLDA: 77 MMHG (ref 80–100)
PO2 BLDA: 80 MMHG (ref 80–100)
PO2 BLDA: 87 MMHG (ref 80–100)
PO2 BLDA: 97 MMHG (ref 80–100)
POC BE: -10 MMOL/L
POC BE: -4 MMOL/L
POC BE: -6 MMOL/L
POC BE: -6 MMOL/L
POC BE: -7 MMOL/L
POC BE: -8 MMOL/L
POC IONIZED CALCIUM: 1.06 MMOL/L (ref 1.06–1.42)
POC IONIZED CALCIUM: 1.07 MMOL/L (ref 1.06–1.42)
POC IONIZED CALCIUM: 1.07 MMOL/L (ref 1.06–1.42)
POC IONIZED CALCIUM: 1.08 MMOL/L (ref 1.06–1.42)
POC IONIZED CALCIUM: 1.1 MMOL/L (ref 1.06–1.42)
POC IONIZED CALCIUM: 1.11 MMOL/L (ref 1.06–1.42)
POC IONIZED CALCIUM: 1.12 MMOL/L (ref 1.06–1.42)
POC IONIZED CALCIUM: 1.15 MMOL/L (ref 1.06–1.42)
POC SATURATED O2: 86 % (ref 95–100)
POC SATURATED O2: 89 % (ref 95–100)
POC SATURATED O2: 90 % (ref 95–100)
POC SATURATED O2: 90 % (ref 95–100)
POC SATURATED O2: 91 % (ref 95–100)
POC SATURATED O2: 92 % (ref 95–100)
POC SATURATED O2: 92 % (ref 95–100)
POC SATURATED O2: 93 % (ref 95–100)
POC SATURATED O2: 93 % (ref 95–100)
POC SATURATED O2: 94 % (ref 95–100)
POC SATURATED O2: 96 % (ref 95–100)
POC SATURATED O2: 97 % (ref 95–100)
POC TCO2: 18 MMOL/L (ref 23–27)
POC TCO2: 19 MMOL/L (ref 23–27)
POC TCO2: 20 MMOL/L (ref 23–27)
POC TCO2: 21 MMOL/L (ref 23–27)
POC TCO2: 22 MMOL/L (ref 23–27)
POC TCO2: 23 MMOL/L (ref 23–27)
POCT GLUCOSE: 237 MG/DL (ref 70–110)
POCT GLUCOSE: 269 MG/DL (ref 70–110)
POCT GLUCOSE: 278 MG/DL (ref 70–110)
POCT GLUCOSE: 295 MG/DL (ref 70–110)
POCT GLUCOSE: 300 MG/DL (ref 70–110)
POCT GLUCOSE: 310 MG/DL (ref 70–110)
POTASSIUM BLD-SCNC: 4.4 MMOL/L (ref 3.5–5.1)
POTASSIUM BLD-SCNC: 4.4 MMOL/L (ref 3.5–5.1)
POTASSIUM BLD-SCNC: 4.5 MMOL/L (ref 3.5–5.1)
POTASSIUM BLD-SCNC: 4.6 MMOL/L (ref 3.5–5.1)
POTASSIUM BLD-SCNC: 4.8 MMOL/L (ref 3.5–5.1)
POTASSIUM BLD-SCNC: 4.9 MMOL/L (ref 3.5–5.1)
POTASSIUM BLD-SCNC: 4.9 MMOL/L (ref 3.5–5.1)
POTASSIUM BLD-SCNC: 5 MMOL/L (ref 3.5–5.1)
POTASSIUM BLD-SCNC: 5.1 MMOL/L (ref 3.5–5.1)
POTASSIUM BLD-SCNC: 5.3 MMOL/L (ref 3.5–5.1)
POTASSIUM SERPL-SCNC: 4.9 MMOL/L (ref 3.5–5.1)
POTASSIUM SERPL-SCNC: 4.9 MMOL/L (ref 3.5–5.1)
POTASSIUM SERPL-SCNC: 5 MMOL/L (ref 3.5–5.1)
POTASSIUM SERPL-SCNC: 5 MMOL/L (ref 3.5–5.1)
POTASSIUM SERPL-SCNC: 5.2 MMOL/L (ref 3.5–5.1)
POTASSIUM SERPL-SCNC: 5.3 MMOL/L (ref 3.5–5.1)
PROT SERPL-MCNC: 6.6 G/DL (ref 6–8.4)
PROT SERPL-MCNC: 6.7 G/DL (ref 6–8.4)
PROT SERPL-MCNC: 6.7 G/DL (ref 6–8.4)
PROTHROMBIN TIME: 13 SEC (ref 9–12.5)
PROTHROMBIN TIME: 14.7 SEC (ref 9–12.5)
RBC # BLD AUTO: 2.41 M/UL (ref 4–5.4)
RBC # BLD AUTO: 2.44 M/UL (ref 4–5.4)
SAMPLE: ABNORMAL
SITE: ABNORMAL
SODIUM BLD-SCNC: 137 MMOL/L (ref 136–145)
SODIUM BLD-SCNC: 138 MMOL/L (ref 136–145)
SODIUM BLD-SCNC: 139 MMOL/L (ref 136–145)
SODIUM BLD-SCNC: 140 MMOL/L (ref 136–145)
SODIUM BLD-SCNC: 140 MMOL/L (ref 136–145)
SODIUM SERPL-SCNC: 136 MMOL/L (ref 136–145)
SODIUM SERPL-SCNC: 136 MMOL/L (ref 136–145)
SODIUM SERPL-SCNC: 137 MMOL/L (ref 136–145)
SP02: 100
SP02: 100
SP02: 86
SP02: 89
SP02: 91
SP02: 91
TRANS ERYTHROCYTES VOL PATIENT: NORMAL ML
TRANS ERYTHROCYTES VOL PATIENT: NORMAL ML
VT: 420
VT: 420
VT: 450
VT: 460
VT: 460
WBC # BLD AUTO: 18.53 K/UL (ref 3.9–12.7)
WBC # BLD AUTO: 20.1 K/UL (ref 3.9–12.7)

## 2020-01-16 PROCEDURE — 63600175 PHARM REV CODE 636 W HCPCS: Performed by: STUDENT IN AN ORGANIZED HEALTH CARE EDUCATION/TRAINING PROGRAM

## 2020-01-16 PROCEDURE — 25000003 PHARM REV CODE 250: Performed by: SURGERY

## 2020-01-16 PROCEDURE — 83735 ASSAY OF MAGNESIUM: CPT | Mod: 91

## 2020-01-16 PROCEDURE — 99233 SBSQ HOSP IP/OBS HIGH 50: CPT | Mod: 25,,, | Performed by: SURGERY

## 2020-01-16 PROCEDURE — 63600175 PHARM REV CODE 636 W HCPCS: Performed by: NURSE PRACTITIONER

## 2020-01-16 PROCEDURE — 85025 COMPLETE CBC W/AUTO DIFF WBC: CPT

## 2020-01-16 PROCEDURE — 80069 RENAL FUNCTION PANEL: CPT

## 2020-01-16 PROCEDURE — 27200188 HC TRANSDUCER, ART ADULT/PEDS

## 2020-01-16 PROCEDURE — 90945 DIALYSIS ONE EVALUATION: CPT

## 2020-01-16 PROCEDURE — C9113 INJ PANTOPRAZOLE SODIUM, VIA: HCPCS | Performed by: SURGERY

## 2020-01-16 PROCEDURE — P9021 RED BLOOD CELLS UNIT: HCPCS

## 2020-01-16 PROCEDURE — 84100 ASSAY OF PHOSPHORUS: CPT | Mod: 91

## 2020-01-16 PROCEDURE — 99233 SBSQ HOSP IP/OBS HIGH 50: CPT | Mod: ,,, | Performed by: NURSE PRACTITIONER

## 2020-01-16 PROCEDURE — 63600367 HC NITRIC OXIDE PER HOUR

## 2020-01-16 PROCEDURE — 63600175 PHARM REV CODE 636 W HCPCS: Performed by: SURGERY

## 2020-01-16 PROCEDURE — 99232 SBSQ HOSP IP/OBS MODERATE 35: CPT | Mod: ,,, | Performed by: NURSE PRACTITIONER

## 2020-01-16 PROCEDURE — 84100 ASSAY OF PHOSPHORUS: CPT

## 2020-01-16 PROCEDURE — C1751 CATH, INF, PER/CENT/MIDLINE: HCPCS

## 2020-01-16 PROCEDURE — 20000000 HC ICU ROOM

## 2020-01-16 PROCEDURE — 82803 BLOOD GASES ANY COMBINATION: CPT

## 2020-01-16 PROCEDURE — 94002 VENT MGMT INPAT INIT DAY: CPT

## 2020-01-16 PROCEDURE — 25000003 PHARM REV CODE 250: Performed by: INTERNAL MEDICINE

## 2020-01-16 PROCEDURE — 27000221 HC OXYGEN, UP TO 24 HOURS

## 2020-01-16 PROCEDURE — 86901 BLOOD TYPING SEROLOGIC RH(D): CPT

## 2020-01-16 PROCEDURE — 99233 PR SUBSEQUENT HOSPITAL CARE,LEVL III: ICD-10-PCS | Mod: ,,, | Performed by: NURSE PRACTITIONER

## 2020-01-16 PROCEDURE — 25000003 PHARM REV CODE 250: Performed by: THORACIC SURGERY (CARDIOTHORACIC VASCULAR SURGERY)

## 2020-01-16 PROCEDURE — 27100171 HC OXYGEN HIGH FLOW UP TO 24 HOURS

## 2020-01-16 PROCEDURE — 37799 UNLISTED PX VASCULAR SURGERY: CPT

## 2020-01-16 PROCEDURE — 36556 PR INSERT NON-TUNNEL CV CATH 5+ YRS OLD: ICD-10-PCS | Mod: ,,, | Performed by: SURGERY

## 2020-01-16 PROCEDURE — 85610 PROTHROMBIN TIME: CPT | Mod: 91

## 2020-01-16 PROCEDURE — 63600175 PHARM REV CODE 636 W HCPCS

## 2020-01-16 PROCEDURE — 80048 BASIC METABOLIC PNL TOTAL CA: CPT | Mod: 91

## 2020-01-16 PROCEDURE — 84295 ASSAY OF SERUM SODIUM: CPT

## 2020-01-16 PROCEDURE — 84132 ASSAY OF SERUM POTASSIUM: CPT

## 2020-01-16 PROCEDURE — 99233 PR SUBSEQUENT HOSPITAL CARE,LEVL III: ICD-10-PCS | Mod: 25,,, | Performed by: SURGERY

## 2020-01-16 PROCEDURE — P9045 ALBUMIN (HUMAN), 5%, 250 ML: HCPCS | Mod: JG | Performed by: THORACIC SURGERY (CARDIOTHORACIC VASCULAR SURGERY)

## 2020-01-16 PROCEDURE — 83605 ASSAY OF LACTIC ACID: CPT

## 2020-01-16 PROCEDURE — 63600175 PHARM REV CODE 636 W HCPCS: Mod: JG | Performed by: THORACIC SURGERY (CARDIOTHORACIC VASCULAR SURGERY)

## 2020-01-16 PROCEDURE — 83735 ASSAY OF MAGNESIUM: CPT

## 2020-01-16 PROCEDURE — 94761 N-INVAS EAR/PLS OXIMETRY MLT: CPT

## 2020-01-16 PROCEDURE — C9399 UNCLASSIFIED DRUGS OR BIOLOG: HCPCS | Performed by: NURSE PRACTITIONER

## 2020-01-16 PROCEDURE — 36556 INSERT NON-TUNNEL CV CATH: CPT | Mod: ,,, | Performed by: SURGERY

## 2020-01-16 PROCEDURE — 25000003 PHARM REV CODE 250

## 2020-01-16 PROCEDURE — 27100092 HC HIGH FLOW DELIVERY CANNULA

## 2020-01-16 PROCEDURE — 82330 ASSAY OF CALCIUM: CPT

## 2020-01-16 PROCEDURE — 85014 HEMATOCRIT: CPT

## 2020-01-16 PROCEDURE — 80048 BASIC METABOLIC PNL TOTAL CA: CPT

## 2020-01-16 PROCEDURE — 99900035 HC TECH TIME PER 15 MIN (STAT)

## 2020-01-16 PROCEDURE — 80053 COMPREHEN METABOLIC PANEL: CPT

## 2020-01-16 PROCEDURE — 99232 PR SUBSEQUENT HOSPITAL CARE,LEVL II: ICD-10-PCS | Mod: ,,, | Performed by: NURSE PRACTITIONER

## 2020-01-16 RX ORDER — METOLAZONE 2.5 MG/1
2.5 TABLET ORAL ONCE
Status: COMPLETED | OUTPATIENT
Start: 2020-01-16 | End: 2020-01-16

## 2020-01-16 RX ORDER — FUROSEMIDE 10 MG/ML
80 INJECTION INTRAMUSCULAR; INTRAVENOUS 2 TIMES DAILY
Status: DISCONTINUED | OUTPATIENT
Start: 2020-01-16 | End: 2020-01-16

## 2020-01-16 RX ORDER — ETOMIDATE 2 MG/ML
10 INJECTION INTRAVENOUS ONCE
Status: COMPLETED | OUTPATIENT
Start: 2020-01-16 | End: 2020-01-16

## 2020-01-16 RX ORDER — FUROSEMIDE 10 MG/ML
40 INJECTION INTRAMUSCULAR; INTRAVENOUS 2 TIMES DAILY
Status: DISCONTINUED | OUTPATIENT
Start: 2020-01-16 | End: 2020-01-16

## 2020-01-16 RX ORDER — MAGNESIUM SULFATE HEPTAHYDRATE 40 MG/ML
2 INJECTION, SOLUTION INTRAVENOUS
Status: DISCONTINUED | OUTPATIENT
Start: 2020-01-16 | End: 2020-01-17

## 2020-01-16 RX ORDER — SUCCINYLCHOLINE CHLORIDE 20 MG/ML
INJECTION INTRAMUSCULAR; INTRAVENOUS
Status: DISPENSED
Start: 2020-01-16 | End: 2020-01-17

## 2020-01-16 RX ORDER — ROCURONIUM BROMIDE 10 MG/ML
INJECTION, SOLUTION INTRAVENOUS
Status: COMPLETED
Start: 2020-01-16 | End: 2020-01-16

## 2020-01-16 RX ORDER — PROPOFOL 10 MG/ML
INJECTION, EMULSION INTRAVENOUS
Status: DISPENSED
Start: 2020-01-16 | End: 2020-01-17

## 2020-01-16 RX ORDER — DOBUTAMINE HYDROCHLORIDE 400 MG/100ML
2.5 INJECTION, SOLUTION INTRAVENOUS CONTINUOUS
Status: DISCONTINUED | OUTPATIENT
Start: 2020-01-16 | End: 2020-01-24

## 2020-01-16 RX ORDER — PHENYLEPHRINE HCL IN 0.9% NACL 1 MG/10 ML
SYRINGE (ML) INTRAVENOUS
Status: DISPENSED
Start: 2020-01-16 | End: 2020-01-17

## 2020-01-16 RX ORDER — PROPOFOL 10 MG/ML
5 INJECTION, EMULSION INTRAVENOUS CONTINUOUS
Status: DISCONTINUED | OUTPATIENT
Start: 2020-01-16 | End: 2020-01-18

## 2020-01-16 RX ORDER — ONDANSETRON 2 MG/ML
INJECTION INTRAMUSCULAR; INTRAVENOUS
Status: COMPLETED
Start: 2020-01-16 | End: 2020-01-16

## 2020-01-16 RX ORDER — ETOMIDATE 2 MG/ML
INJECTION INTRAVENOUS
Status: COMPLETED
Start: 2020-01-16 | End: 2020-01-16

## 2020-01-16 RX ORDER — ROCURONIUM BROMIDE 10 MG/ML
50 INJECTION, SOLUTION INTRAVENOUS ONCE
Status: COMPLETED | OUTPATIENT
Start: 2020-01-16 | End: 2020-01-16

## 2020-01-16 RX ORDER — FUROSEMIDE 10 MG/ML
80 INJECTION INTRAMUSCULAR; INTRAVENOUS ONCE
Status: COMPLETED | OUTPATIENT
Start: 2020-01-16 | End: 2020-01-16

## 2020-01-16 RX ORDER — METOLAZONE 2.5 MG/1
2.5 TABLET ORAL 2 TIMES DAILY
Status: DISCONTINUED | OUTPATIENT
Start: 2020-01-16 | End: 2020-01-16

## 2020-01-16 RX ORDER — INDOMETHACIN 25 MG/1
50 CAPSULE ORAL ONCE
Status: COMPLETED | OUTPATIENT
Start: 2020-01-16 | End: 2020-01-16

## 2020-01-16 RX ORDER — HYDROCODONE BITARTRATE AND ACETAMINOPHEN 500; 5 MG/1; MG/1
TABLET ORAL CONTINUOUS
Status: DISCONTINUED | OUTPATIENT
Start: 2020-01-16 | End: 2020-01-17

## 2020-01-16 RX ORDER — PROPOFOL 10 MG/ML
INJECTION, EMULSION INTRAVENOUS
Status: COMPLETED
Start: 2020-01-16 | End: 2020-01-16

## 2020-01-16 RX ADMIN — NICARDIPINE HYDROCHLORIDE 5 MG/HR: 0.2 INJECTION, SOLUTION INTRAVENOUS at 04:01

## 2020-01-16 RX ADMIN — METOLAZONE 2.5 MG: 2.5 TABLET ORAL at 11:01

## 2020-01-16 RX ADMIN — INSULIN DETEMIR 20 UNITS: 100 INJECTION, SOLUTION SUBCUTANEOUS at 11:01

## 2020-01-16 RX ADMIN — OXYCODONE HYDROCHLORIDE 10 MG: 10 TABLET ORAL at 09:01

## 2020-01-16 RX ADMIN — INSULIN ASPART 4 UNITS: 100 INJECTION, SOLUTION INTRAVENOUS; SUBCUTANEOUS at 03:01

## 2020-01-16 RX ADMIN — ROCURONIUM BROMIDE 50 MG: 10 INJECTION, SOLUTION INTRAVENOUS at 03:01

## 2020-01-16 RX ADMIN — PANTOPRAZOLE SODIUM 40 MG: 40 INJECTION, POWDER, FOR SOLUTION INTRAVENOUS at 08:01

## 2020-01-16 RX ADMIN — ETOMIDATE 10 MG: 2 INJECTION INTRAVENOUS at 03:01

## 2020-01-16 RX ADMIN — SODIUM CHLORIDE: 0.9 INJECTION, SOLUTION INTRAVENOUS at 10:01

## 2020-01-16 RX ADMIN — HYDROMORPHONE HYDROCHLORIDE 0.2 MG: 1 INJECTION, SOLUTION INTRAMUSCULAR; INTRAVENOUS; SUBCUTANEOUS at 11:01

## 2020-01-16 RX ADMIN — MUPIROCIN 1 G: 20 OINTMENT TOPICAL at 08:01

## 2020-01-16 RX ADMIN — ONDANSETRON 4 MG: 2 INJECTION INTRAMUSCULAR; INTRAVENOUS at 03:01

## 2020-01-16 RX ADMIN — HYDROMORPHONE HYDROCHLORIDE 0.2 MG: 1 INJECTION, SOLUTION INTRAMUSCULAR; INTRAVENOUS; SUBCUTANEOUS at 04:01

## 2020-01-16 RX ADMIN — DOBUTAMINE IN DEXTROSE 5 MCG/KG/MIN: 200 INJECTION, SOLUTION INTRAVENOUS at 04:01

## 2020-01-16 RX ADMIN — PROPOFOL 20 MCG/KG/MIN: 10 INJECTION, EMULSION INTRAVENOUS at 03:01

## 2020-01-16 RX ADMIN — INSULIN ASPART 1 UNITS: 100 INJECTION, SOLUTION INTRAVENOUS; SUBCUTANEOUS at 11:01

## 2020-01-16 RX ADMIN — METOCLOPRAMIDE 10 MG: 5 INJECTION, SOLUTION INTRAMUSCULAR; INTRAVENOUS at 09:01

## 2020-01-16 RX ADMIN — INSULIN ASPART 1 UNITS: 100 INJECTION, SOLUTION INTRAVENOUS; SUBCUTANEOUS at 12:01

## 2020-01-16 RX ADMIN — FUROSEMIDE 40 MG: 10 INJECTION, SOLUTION INTRAMUSCULAR; INTRAVENOUS at 08:01

## 2020-01-16 RX ADMIN — ASPIRIN 81 MG CHEWABLE TABLET 81 MG: 81 TABLET CHEWABLE at 08:01

## 2020-01-16 RX ADMIN — HEPARIN SODIUM 5000 UNITS: 5000 INJECTION, SOLUTION INTRAVENOUS; SUBCUTANEOUS at 04:01

## 2020-01-16 RX ADMIN — FUROSEMIDE 80 MG: 20 INJECTION, SOLUTION INTRAMUSCULAR; INTRAVENOUS at 11:01

## 2020-01-16 RX ADMIN — HEPARIN SODIUM 5000 UNITS: 5000 INJECTION, SOLUTION INTRAVENOUS; SUBCUTANEOUS at 10:01

## 2020-01-16 RX ADMIN — EPINEPHRINE 0.08 MCG/KG/MIN: 1 INJECTION PARENTERAL at 02:01

## 2020-01-16 RX ADMIN — CLOPIDOGREL BISULFATE 75 MG: 75 TABLET, FILM COATED ORAL at 08:01

## 2020-01-16 RX ADMIN — PROPOFOL 40 MCG/KG/MIN: 10 INJECTION, EMULSION INTRAVENOUS at 08:01

## 2020-01-16 RX ADMIN — ATORVASTATIN CALCIUM 80 MG: 20 TABLET, FILM COATED ORAL at 08:01

## 2020-01-16 RX ADMIN — HEPARIN SODIUM 5000 UNITS: 5000 INJECTION, SOLUTION INTRAVENOUS; SUBCUTANEOUS at 05:01

## 2020-01-16 RX ADMIN — INSULIN ASPART 2 UNITS: 100 INJECTION, SOLUTION INTRAVENOUS; SUBCUTANEOUS at 08:01

## 2020-01-16 RX ADMIN — INSULIN ASPART 3 UNITS: 100 INJECTION, SOLUTION INTRAVENOUS; SUBCUTANEOUS at 03:01

## 2020-01-16 RX ADMIN — ALBUMIN (HUMAN) 25 G: 12.5 INJECTION, SOLUTION INTRAVENOUS at 12:01

## 2020-01-16 RX ADMIN — SODIUM BICARBONATE 50 MEQ: 84 INJECTION, SOLUTION INTRAVENOUS at 01:01

## 2020-01-16 RX ADMIN — VASOPRESSIN 0.02 UNITS/MIN: 20 INJECTION INTRAVENOUS at 11:01

## 2020-01-16 RX ADMIN — MUPIROCIN 1 G: 20 OINTMENT TOPICAL at 09:01

## 2020-01-16 RX ADMIN — VASOPRESSIN 0.02 UNITS/MIN: 20 INJECTION INTRAVENOUS at 02:01

## 2020-01-16 RX ADMIN — INSULIN ASPART 3 UNITS: 100 INJECTION, SOLUTION INTRAVENOUS; SUBCUTANEOUS at 07:01

## 2020-01-16 NOTE — PROGRESS NOTES
"[facility]  Endocrinology  Progress Note    Admit Date: 1/2/2020     Reason for Consult: Management of T2DM, Hyperglycemia     Surgical Procedure and Date: n/a    Diabetes diagnosis year:  2006    Home Diabetes Medications:  Lantus 50 units daily and novolog 9 units with meals   Lab Results   Component Value Date    HGBA1C 9.7 (H) 01/03/2020         How often checking glucose at home? 1-3 x day   BG readings on regimen: 100-200 typically; sometimes in 300s   Hypoglycemia on the regimen?  Yes occasionally - usually overnight   Missed doses on regimen?  Yes    Diabetes Complications include:     Hyperglycemia, Hypoglycemia  and Diabetic peripheral neuropathy     Complicating diabetes co morbidities:   History of MI and CHF      HPI:   Patient is a 54 y.o. female with a diagnosis of uncontrolled type 2 DM. Also with CHF, PAD, HTN, HLD, and CAD. Patient presented with chest pain and diaphoresis. Also with nauseated, diaphoretic, a mild headache, epigastric pain, with a vague "heart burn" like feeling in her chest. Patient admitted for treatment and further workup. Endocrinology consulted for BG/ DM management.             Interval HPI:   Overnight events: BG is above goal ranges on prn novolog correction scale.    Eating:   NPO  Nausea: No  Hypoglycemia and intervention: No  Fever: No  TPN and/or TF: No  If yes, type of TF/TPN and rate: none    /69 (BP Location: Right arm, Patient Position: Lying)   Pulse 80   Temp 98.4 °F (36.9 °C) (Oral)   Resp 14   Ht 5' 4" (1.626 m)   Wt 101.1 kg (222 lb 14.2 oz)   LMP 09/30/2015 (Exact Date)   SpO2 (!) 91%   Breastfeeding? No   BMI 38.26 kg/m²      Labs Reviewed and Include    Recent Labs   Lab 01/16/20  0350   *  270*   CALCIUM 8.5*  8.5*     137   K 5.2*  5.2*   CO2 18*  18*     103   BUN 47*  47*   CREATININE 3.8*  3.8*     Lab Results   Component Value Date    WBC 20.10 (H) 01/16/2020    HGB 7.6 (L) 01/16/2020    HCT " 23 (L) 01/16/2020     (H) 01/16/2020     01/16/2020     No results for input(s): TSH, FREET4 in the last 168 hours.  Lab Results   Component Value Date    HGBA1C 9.7 (H) 01/03/2020       Nutritional status:   Body mass index is 38.26 kg/m².  Lab Results   Component Value Date    ALBUMIN 2.6 (L) 01/14/2020    ALBUMIN 2.6 (L) 01/14/2020    ALBUMIN 2.4 (L) 01/13/2020     No results found for: PREALBUMIN    Estimated Creatinine Clearance: 19.8 mL/min (A) (based on SCr of 3.8 mg/dL (H)).    Accu-Checks  Recent Labs     01/15/20  0758 01/15/20  0825 01/15/20  0842 01/15/20  0938 01/15/20  1206 01/15/20  1611 01/15/20  1936 01/16/20  0039 01/16/20  0354 01/16/20  0835   POCTGLUCOSE 42* 96 98 77 124* 181* 179* 269* 300* 237*       Current Medications and/or Treatments Impacting Glycemic Control  Immunotherapy:    Immunosuppressants     None        Steroids:   Hormones (From admission, onward)    Start     Stop Route Frequency Ordered    01/14/20 1230  vasopressin (PITRESSIN) 0.2 Units/mL in dextrose 5 % 100 mL infusion      -- IV Continuous 01/14/20 1129        Pressors:    Autonomic Drugs (From admission, onward)    Start     Stop Route Frequency Ordered    01/14/20 1853  rocuronium 10 mg/mL injection     Note to Pharmacy:  Created by cabinet override    01/15 0659   01/14/20 1853    01/14/20 1230  EPINEPHrine (ADRENALIN) 5 mg in sodium chloride 0.9% 250 mL infusion     Question Answer Comment   Titrate by: (in mcg/kg/min) 0.01    Titrate interval: (in minutes) 5    Titrate to maintain: (SBP or MAP or Cardiac Index) SBP    Maximum dose: (in mcg/kg/min) 2        -- IV Continuous 01/14/20 1129    01/14/20 1230  norepinephrine 4 mg in dextrose 5% 250 mL infusion (premix) (titrating)     Question Answer Comment   Titrate by: (in mcg/kg/min) 0.05    Titrate interval: (in minutes) 5    Titrate to maintain: (MAP or SBP) MAP    Greater than: (in mmHg) 65    Maximum dose: (in mcg/kg/min) 3        -- IV Continuous  "01/14/20 1129        Hyperglycemia/Diabetes Medications:   Antihyperglycemics (From admission, onward)    Start     Stop Route Frequency Ordered    01/16/20 0915  insulin detemir U-100 pen 20 Units      -- SubQ Daily 01/16/20 0906    01/15/20 0946  insulin aspart U-100 pen 0-5 Units      -- SubQ Every 4 hours PRN 01/15/20 0847          ASSESSMENT and PLAN    * Acute coronary syndrome  Managed per primary team  Avoid hypoglycemia        Type 2 diabetes mellitus with hyperglycemia, with long-term current use of insulin  BG goal 140 - 180   BG is now above goal ranges.     Start Levemir 20 units daily. (0.4 u/kg weight based dosing)  Will consider adding Novolog if diet has advanced.  Low Dose SQ Insulin Correction Scale every 4 hours while NPO.   BG monitoring every 4 hours while NPO.     ** Please call Endocrine for any BG related issues **  ** Please notify Endocrine for any change and/or advance in diet**      Discharge Planning:     TBD. Please notify endocrinology prior to discharge. Patient will need adjustments to home insulin regimen.     Tentative:     Levemir 20 units HS.     Novolog 7 units TIDWM.     Low Dose SQ Insulin Correction Scale.    BG Monitoring AC/HS     PRN Insurance preferred diabetes testing supplies    PRN Ultra-Fine Junie Pen Needles 4mm x 32 G (5/32" x 0.23mm).     Patient request outpatient follow-up with [facility] endo clinic for continued DM management.          ROSLYN (acute kidney injury)  Caution with insulin stacking        Mixed hyperlipidemia  Managed per primary team  Condition may cause insulin resistance         Essential hypertension  Managed per primary team  Condition may cause insulin resistance         Acute on chronic combined systolic (congestive) and diastolic (congestive) heart failure  Optimize glucose control and  avoid hypoglycemia          CAD (coronary artery disease)  Optimize glucose control and  avoid hypoglycemia            Mary Baptiste NP  Endocrinology  [facility] " [facility]

## 2020-01-16 NOTE — PT/OT/SLP PROGRESS
Physical Therapy      Patient Name:  Suyapa Connelly   MRN:  [id number]    Patient not seen today secondary to (pt on hold 2nd to oxygen desaturation with movement. ). Will follow-up at a later date.    Kelli Martinez, PT   1/16/2020

## 2020-01-16 NOTE — EICU
E-alerted into room by bedside staff. Dr. Cordova supervised by Dr. Lozano to place central line. Time out performed and documented, LIJ TLC placed. Xray pending.

## 2020-01-16 NOTE — PLAN OF CARE
Plan of Care Note  Cardiothoracic Surgery    Suyapa Connelly is a 54 y.o. female with CAD, HF, DM, who underwent 1v off-pump CABG (1/14/20)    Specific issues: fluid status/renal function/UOP, consider lasix vs dialysis; respiratory function consider intubation, nausea and vomiting    Plan of care for patient was discussed with ICU staff including nurses, residents, and faculty and appropriate consulting services.    Will continue to monitor patient's hemodynamics, functionality, neuro status, fluid status and renal function, and labs and will adjust medications and fluids as necessary while monitoring appropriateness for de-escalation of support and monitoring and transport to stepdown unit.    Christian Dobson MD  Cardiothoracic Surgery Fellow

## 2020-01-16 NOTE — PROGRESS NOTES
[facility]  Critical Care - Surgery  Progress Note    Patient Name: Suyapa Connelly  MRN: [id number]  Admission Date: 1/2/2020  Hospital Length of Stay: 14 days  Code Status: Prior  Attending Provider: Huang Altamirano MD  Primary Care Provider: Erlinda Rahman NP   Principal Problem: Acute coronary syndrome    Subjective:     Hospital/ICU Course:  1/15: POD1. ON nausea without emesis. Zofran. 2L albumin, 5 amps bicarb. Clinically improving. 250 albumin running.     Interval History/Significant Events:     Increased oxygen requirement overnight.  Continues to make minimal urine.  Nausea improved with scopolamine patch and Zofran.      Follow-up For: Procedure(s) (LRB):  CORONARY ARTERY BYPASS GRAFT (CABG) x 1     Off Pump (N/A)    Post-Operative Day: 2 Days Post-Op    Objective:     Vital Signs (Most Recent):  Temp: 98.4 °F (36.9 °C) (01/16/20 0700)  Pulse: 61 (01/16/20 1104)  Resp: 11 (01/16/20 1104)  BP: 114/63 (01/16/20 1103)  SpO2: (!) 91 % (01/16/20 1104) Vital Signs (24h Range):  Temp:  [98.1 °F (36.7 °C)-98.6 °F (37 °C)] 98.4 °F (36.9 °C)  Pulse:  [57-86] 61  Resp:  [9-26] 11  SpO2:  [87 %-100 %] 91 %  BP: (100-144)/(57-87) 114/63  Arterial Line BP: ()/(44-67) 134/55     Weight: 101.1 kg (222 lb 14.2 oz)  Body mass index is 38.26 kg/m².      Intake/Output Summary (Last 24 hours) at 1/16/2020 1114  Last data filed at 1/16/2020 1000  Gross per 24 hour   Intake 2225.2 ml   Output 320 ml   Net 1905.2 ml       Physical Exam   Constitutional: She is oriented to person, place, and time. She appears well-developed and well-nourished.   HENT:   Head: Normocephalic and atraumatic.   Nose: Nose normal.   Eyes: Pupils are equal, round, and reactive to light. Conjunctivae and EOM are normal.   Neck: Normal range of motion. Neck supple.   Cardiovascular:   Dressing in place  Chest tubes in place  Paced at 80, intrinsic rate of 58   Pulmonary/Chest: Effort normal and breath sounds normal.    Abdominal: Soft. Bowel sounds are normal.   Genitourinary:   Genitourinary Comments: Kerns in place   Musculoskeletal: Normal range of motion.   Neurological: She is alert and oriented to person, place, and time.   Skin: Skin is warm and dry. Capillary refill takes less than 2 seconds.   Psychiatric: She has a normal mood and affect.       Vents:  Oxygen Concentration (%): 100 (01/16/20 1103)    Lines/Drains/Airways     Central Venous Catheter Line                 Percutaneous Central Line Insertion/Assessment - double lumen  01/14/20 0725 2 days          Drain                 Chest Tube 01/14/20 1017 1 Left Pleural 19 Fr. 2 days         Urethral Catheter 01/14/20 0720 Non-latex;Temperature probe;Straight-tip 14 Fr. 2 days         Y Chest Tube 1 and 2 01/14/20 1018 1 Anterior Mediastinal 19 Fr. 2 Anterior Mediastinal 19 Fr. 2 days          Arterial Line                 Arterial Line 01/14/20 0701 Left Radial 2 days          Line                 Pacer Wires 01/14/20 1009 2 days                Significant Labs:    CBC/Anemia Profile:  Recent Labs   Lab 01/14/20  1332  01/15/20  0300  01/16/20  0350  01/16/20  0711 01/16/20  0907 01/16/20  1103   WBC 17.28*  --  16.58*  --  20.10*  --   --   --   --    HGB 8.7*  --  8.0*  --  7.6*  --   --   --   --    HCT 27.4*   < > 25.7*   < > 24.5*   < > 23* 24* 24*     --  250  --  211  --   --   --   --    MCV 99*  --  100*  --  100*  --   --   --   --    RDW 13.0  --  13.3  --  14.0  --   --   --   --     < > = values in this interval not displayed.        Chemistries:  Recent Labs   Lab 01/14/20  1155  01/15/20  0300  01/15/20  2123 01/16/20  0035 01/16/20  0350      < > 140  140   < > 139 136 137  137   K 4.6  4.6   < > 4.2  4.2   < > 5.3* 5.3* 5.2*  5.2*      < > 107  107   < > 107 103 103  103   CO2 20*   < > 19*  19*   < > 17* 18* 18*  18*   BUN 20   < > 31*  31*   < > 43* 44* 47*  47*   CREATININE 0.9   < > 2.1*  2.1*   < > 3.1* 3.3* 3.8*   3.8*   CALCIUM 8.4*   < > 8.7  8.7   < > 8.5* 8.6* 8.5*  8.5*   ALBUMIN 2.6*  --   --   --   --   --   --    PROT 5.9*  --   --   --   --   --   --    BILITOT 0.6  --   --   --   --   --   --    ALKPHOS 76  --   --   --   --   --   --    ALT 16  --   --   --   --   --   --    AST 16  --   --   --   --   --   --    MG 2.0  --  1.8  --  1.9  --   --    PHOS 3.6  --   --   --  6.8*  --   --     < > = values in this interval not displayed.       All pertinent labs within the past 24 hours have been reviewed.    Significant Imaging:  Vascular catheters in place.  Heart size is unchanged.  Chest tube is seen on the left with no pneumothorax.  Mild patchy increased lung markings seen bilaterally with little interval change    Assessment/Plan:     Acute on chronic combined systolic (congestive) and diastolic (congestive) heart failure  Neuro:   - Pain control: prn APAP, oxy. Prn dilaudid as needed.      Pulmonary:   - Increasing oxygen requirement. Currently on 45L FiO2 of 100%, likely to improve with diuresis  - Wean from O2 as able  - Daily CXR  - ABG q2 hours or as needed     Cardiac:  - Drips: Epi @ 0.06 and Vaso @ 0.04  - Wean from vasopressors as able  - Patient with pre-op EF of about 30%, intra-op EF of about 50%  - No blood products received in OR  - Goals MAP > 70, SBP < 150  - Pace at 80 if her HR drops below 55 bpm     Renal:   - Trend BUN/Cr.   - ROSLYN with poor urine output  - Given dose of 40 mg of Lasix with minimal output, will give dose of 80mg Lasix with 2.5 mg of Metolazone   - Kerns in place for close monitoring of UOP     Infectious Disease:   - Afebrile post-op, WBC 20  - Trend WBC  - Antibiotics: Perioperative     Hematology/Oncology:  - post-op H/H, trending down  - Continue to monitor CT output     Endocrine:  - Endocrine consulted, keep blood glucose goals 140-180     Fluids/Electrolytes/Nutrition/GI:   - D5 NS 20 KCl @ 5  - Trend CMP  - Replace Lytes PRN   - Advance diet as tolerated  -  Scopolamine patch, PRN Zofran       Prophylaxis:  - GI: protonix daily     Dispo:  Continue ICU care, continue close monitoring of ABGs/wean from oxygen as able. Pain control.  Primary team: CTS           Critical care was time spent personally by me on the following activities: development of treatment plan with patient or surrogate and bedside caregivers, discussions with consultants, evaluation of patient's response to treatment, examination of patient, ordering and performing treatments and interventions, ordering and review of laboratory studies, ordering and review of radiographic studies, pulse oximetry, re-evaluation of patient's condition.  This critical care time did not overlap with that of any other provider or involve time for any procedures.     Aburey Lozano MD  Critical Care - Surgery  [facility]

## 2020-01-16 NOTE — SUBJECTIVE & OBJECTIVE
Interval History/Significant Events:     Increased oxygen requirement overnight.  Continues to make minimal urine.  Nausea improved with scopolamine patch and Zofran.      Follow-up For: Procedure(s) (LRB):  CORONARY ARTERY BYPASS GRAFT (CABG) x 1     Off Pump (N/A)    Post-Operative Day: 2 Days Post-Op    Objective:     Vital Signs (Most Recent):  Temp: 98.4 °F (36.9 °C) (01/16/20 0700)  Pulse: 61 (01/16/20 1104)  Resp: 11 (01/16/20 1104)  BP: 114/63 (01/16/20 1103)  SpO2: (!) 91 % (01/16/20 1104) Vital Signs (24h Range):  Temp:  [98.1 °F (36.7 °C)-98.6 °F (37 °C)] 98.4 °F (36.9 °C)  Pulse:  [57-86] 61  Resp:  [9-26] 11  SpO2:  [87 %-100 %] 91 %  BP: (100-144)/(57-87) 114/63  Arterial Line BP: ()/(44-67) 134/55     Weight: 101.1 kg (222 lb 14.2 oz)  Body mass index is 38.26 kg/m².      Intake/Output Summary (Last 24 hours) at 1/16/2020 1114  Last data filed at 1/16/2020 1000  Gross per 24 hour   Intake 2225.2 ml   Output 320 ml   Net 1905.2 ml       Physical Exam   Constitutional: She is oriented to person, place, and time. She appears well-developed and well-nourished.   HENT:   Head: Normocephalic and atraumatic.   Nose: Nose normal.   Eyes: Pupils are equal, round, and reactive to light. Conjunctivae and EOM are normal.   Neck: Normal range of motion. Neck supple.   Cardiovascular:   Dressing in place  Chest tubes in place  Paced at 80, intrinsic rate of 58   Pulmonary/Chest: Effort normal and breath sounds normal.   Abdominal: Soft. Bowel sounds are normal.   Genitourinary:   Genitourinary Comments: Kerns in place   Musculoskeletal: Normal range of motion.   Neurological: She is alert and oriented to person, place, and time.   Skin: Skin is warm and dry. Capillary refill takes less than 2 seconds.   Psychiatric: She has a normal mood and affect.       Vents:  Oxygen Concentration (%): 100 (01/16/20 1103)    Lines/Drains/Airways     Central Venous Catheter Line                 Percutaneous Central Line  Insertion/Assessment - double lumen  01/14/20 0725 2 days          Drain                 Chest Tube 01/14/20 1017 1 Left Pleural 19 Fr. 2 days         Urethral Catheter 01/14/20 0720 Non-latex;Temperature probe;Straight-tip 14 Fr. 2 days         Y Chest Tube 1 and 2 01/14/20 1018 1 Anterior Mediastinal 19 Fr. 2 Anterior Mediastinal 19 Fr. 2 days          Arterial Line                 Arterial Line 01/14/20 0701 Left Radial 2 days          Line                 Pacer Wires 01/14/20 1009 2 days                Significant Labs:    CBC/Anemia Profile:  Recent Labs   Lab 01/14/20  1332  01/15/20  0300  01/16/20  0350  01/16/20  0711 01/16/20  0907 01/16/20  1103   WBC 17.28*  --  16.58*  --  20.10*  --   --   --   --    HGB 8.7*  --  8.0*  --  7.6*  --   --   --   --    HCT 27.4*   < > 25.7*   < > 24.5*   < > 23* 24* 24*     --  250  --  211  --   --   --   --    MCV 99*  --  100*  --  100*  --   --   --   --    RDW 13.0  --  13.3  --  14.0  --   --   --   --     < > = values in this interval not displayed.        Chemistries:  Recent Labs   Lab 01/14/20  1155  01/15/20  0300  01/15/20  2123 01/16/20  0035 01/16/20  0350      < > 140  140   < > 139 136 137  137   K 4.6  4.6   < > 4.2  4.2   < > 5.3* 5.3* 5.2*  5.2*      < > 107  107   < > 107 103 103  103   CO2 20*   < > 19*  19*   < > 17* 18* 18*  18*   BUN 20   < > 31*  31*   < > 43* 44* 47*  47*   CREATININE 0.9   < > 2.1*  2.1*   < > 3.1* 3.3* 3.8*  3.8*   CALCIUM 8.4*   < > 8.7  8.7   < > 8.5* 8.6* 8.5*  8.5*   ALBUMIN 2.6*  --   --   --   --   --   --    PROT 5.9*  --   --   --   --   --   --    BILITOT 0.6  --   --   --   --   --   --    ALKPHOS 76  --   --   --   --   --   --    ALT 16  --   --   --   --   --   --    AST 16  --   --   --   --   --   --    MG 2.0  --  1.8  --  1.9  --   --    PHOS 3.6  --   --   --  6.8*  --   --     < > = values in this interval not displayed.       All pertinent labs within the past 24 hours have  been reviewed.    Significant Imaging:  Vascular catheters in place.  Heart size is unchanged.  Chest tube is seen on the left with no pneumothorax.  Mild patchy increased lung markings seen bilaterally with little interval change

## 2020-01-16 NOTE — PT/OT/SLP PROGRESS
Occupational Therapy      Patient Name:  Suyapa Connelly   MRN:  [id number]    Patient not seen today secondary to on hold per RN in am and pm 2* labile respiratory status.  Dayan Gomez, LOTR  1/16/2020

## 2020-01-16 NOTE — EICU
E-alerted into room by bedside staff. Dr. Lozano, respiratory therapist and staff at bedside. Etomidate 10 mg IVP and rocuronium 50 mg IVP given. Pt intubated with 7.0 ET tube taped 21 @ teeth. Color change noted, bilateral breath sounds auscultated, chest xray pending.

## 2020-01-16 NOTE — ASSESSMENT & PLAN NOTE
Neuro:   - Pain control: prn APAP, oxy. Prn dilaudid as needed.      Pulmonary:   - Increasing oxygen requirement. Currently on 45L FiO2 of 100%, likely to improve with diuresis  - Wean from O2 as able  - Daily CXR  - ABG q2 hours or as needed     Cardiac:  - Drips: Epi @ 0.06 and Vaso @ 0.04  - Wean from vasopressors as able  - Patient with pre-op EF of about 30%, intra-op EF of about 50%  - No blood products received in OR  - Goals MAP > 70, SBP < 150  - Pace at 80 if her HR drops below 55 bpm     Renal:   - Trend BUN/Cr.   - ROSLYN with poor urine output  - Given dose of 40 mg of Lasix with minimal output, will give dose of 80mg Lasix with 2.5 mg of Metolazone   - Kerns in place for close monitoring of UOP     Infectious Disease:   - Afebrile post-op, WBC 20  - Trend WBC  - Antibiotics: Perioperative     Hematology/Oncology:  - post-op H/H, trending down  - Continue to monitor CT output     Endocrine:  - Endocrine consulted, keep blood glucose goals 140-180     Fluids/Electrolytes/Nutrition/GI:   - D5 NS 20 KCl @ 5  - Trend CMP  - Replace Lytes PRN   - Advance diet as tolerated  - Scopolamine patch, PRN Zofran       Prophylaxis:  - GI: protonix daily     Dispo:  Continue ICU care, continue close monitoring of ABGs/wean from oxygen as able. Pain control.  Primary team: CTS

## 2020-01-16 NOTE — PLAN OF CARE
"      SICU PLAN OF CARE NOTE    Dx: Acute coronary syndrome    Shift Events: Trending ABG q 2. Comfort flow increase to 100% and 30 L. 250 of albumin and 1 amp of bicarb given. Persistent nausea throughout the night.     Goals of Care: wean pressor requirement.    Neuro: Arouses to Voice and Follows Commands    Vital Signs: /72   Pulse 80   Temp 98.4 °F (36.9 °C) (Oral)   Resp 11   Ht 5' 4" (1.626 m)   Wt 101.1 kg (222 lb 14.2 oz)   LMP 09/30/2015 (Exact Date)   SpO2 100%   Breastfeeding? No   BMI 38.26 kg/m²     Respiratory: Comfort Rl    Diet: NPO and Sips with Meds    Gtts: Vasopressin and Epinephrine    Urine Output: 5-15 cc/hr    Chest tube - 70 cc on shift      Labs/Accuchecks: Labs and abg trended.     Skin: All bandages changes. No skin break down noted.        "

## 2020-01-16 NOTE — SUBJECTIVE & OBJECTIVE
"Interval HPI:   Overnight events: BG is above goal ranges on prn novolog correction scale.    Eating:   NPO  Nausea: No  Hypoglycemia and intervention: No  Fever: No  TPN and/or TF: No  If yes, type of TF/TPN and rate: none    /69 (BP Location: Right arm, Patient Position: Lying)   Pulse 80   Temp 98.4 °F (36.9 °C) (Oral)   Resp 14   Ht 5' 4" (1.626 m)   Wt 101.1 kg (222 lb 14.2 oz)   LMP 09/30/2015 (Exact Date)   SpO2 (!) 91%   Breastfeeding? No   BMI 38.26 kg/m²     Labs Reviewed and Include    Recent Labs   Lab 01/16/20  0350   *  270*   CALCIUM 8.5*  8.5*     137   K 5.2*  5.2*   CO2 18*  18*     103   BUN 47*  47*   CREATININE 3.8*  3.8*     Lab Results   Component Value Date    WBC 20.10 (H) 01/16/2020    HGB 7.6 (L) 01/16/2020    HCT 23 (L) 01/16/2020     (H) 01/16/2020     01/16/2020     No results for input(s): TSH, FREET4 in the last 168 hours.  Lab Results   Component Value Date    HGBA1C 9.7 (H) 01/03/2020       Nutritional status:   Body mass index is 38.26 kg/m².  Lab Results   Component Value Date    ALBUMIN 2.6 (L) 01/14/2020    ALBUMIN 2.6 (L) 01/14/2020    ALBUMIN 2.4 (L) 01/13/2020     No results found for: PREALBUMIN    Estimated Creatinine Clearance: 19.8 mL/min (A) (based on SCr of 3.8 mg/dL (H)).    Accu-Checks  Recent Labs     01/15/20  0758 01/15/20  0825 01/15/20  0842 01/15/20  0938 01/15/20  1206 01/15/20  1611 01/15/20  1936 01/16/20  0039 01/16/20  0354 01/16/20  0835   POCTGLUCOSE 42* 96 98 77 124* 181* 179* 269* 300* 237*       Current Medications and/or Treatments Impacting Glycemic Control  Immunotherapy:    Immunosuppressants     None        Steroids:   Hormones (From admission, onward)    Start     Stop Route Frequency Ordered    01/14/20 1230  vasopressin (PITRESSIN) 0.2 Units/mL in dextrose 5 % 100 mL infusion      -- IV Continuous 01/14/20 1129        Pressors:    Autonomic Drugs (From admission, onward)    Start     Stop " Route Frequency Ordered    01/14/20 1853  rocuronium 10 mg/mL injection     Note to Pharmacy:  Created by cabinet override    01/15 0659   01/14/20 1853 01/14/20 1230  EPINEPHrine (ADRENALIN) 5 mg in sodium chloride 0.9% 250 mL infusion     Question Answer Comment   Titrate by: (in mcg/kg/min) 0.01    Titrate interval: (in minutes) 5    Titrate to maintain: (SBP or MAP or Cardiac Index) SBP    Maximum dose: (in mcg/kg/min) 2        -- IV Continuous 01/14/20 1129    01/14/20 1230  norepinephrine 4 mg in dextrose 5% 250 mL infusion (premix) (titrating)     Question Answer Comment   Titrate by: (in mcg/kg/min) 0.05    Titrate interval: (in minutes) 5    Titrate to maintain: (MAP or SBP) MAP    Greater than: (in mmHg) 65    Maximum dose: (in mcg/kg/min) 3        -- IV Continuous 01/14/20 1129        Hyperglycemia/Diabetes Medications:   Antihyperglycemics (From admission, onward)    Start     Stop Route Frequency Ordered    01/16/20 0915  insulin detemir U-100 pen 20 Units      -- SubQ Daily 01/16/20 0906    01/15/20 0946  insulin aspart U-100 pen 0-5 Units      -- SubQ Every 4 hours PRN 01/15/20 0847

## 2020-01-16 NOTE — CONSULTS
Consult acknowledge and labs reviewed. Unable to examine patient at this time 2/2 getting intubated/line placement. Diuretics given at 1415, will monitor for response. If inadequate response to diuretics, will initiate SLED. Full consult and recs to follow. Current plan discussed with staff.     SAMMY Byrd, AGNP-C  Nephrology  Pager: [phone]

## 2020-01-16 NOTE — PROCEDURES
"Suyapa Connelly is a 54 y.o. female patient.    Temp: 99 °F (37.2 °C) (01/16/20 1500)  Pulse: 66 (01/16/20 1515)  Resp: (!) 22 (01/16/20 1515)  BP: 126/64 (01/16/20 1515)  SpO2: (!) 84 % (01/16/20 1515)  Weight: 101.1 kg (222 lb 14.2 oz) (01/15/20 0554)  Height: 5' 4" (162.6 cm) (01/03/20 0806)       Central Line  Date/Time: 1/16/2020 3:01 PM  Location procedure was performed: [facility] SURGICAL ICU (SICU)  Performed by: Negrita Cordova MD  Assisting provider: Aubrey Juarez MD  Pre-operative Diagnosis: Volume overload  Post-operative diagnosis: Volume overload  Consent Done: Yes  Site marked: the operative site was marked  Time out: Immediately prior to procedure a "time out" was called to verify the correct patient, procedure, equipment, support staff and site/side marked as required.  Indications: diagnostic evaluation, hemodialysis, hemodynamic monitoring and med administration  Anesthesia: local infiltration    Anesthesia:  Local anesthesia used: yes  Local Anesthetic: lidocaine 1% without epinephrine  Anesthetic total: 3 mL  Description of findings: large left internal jugular vein   Preparation: skin prepped with ChloraPrep  Skin prep agent dried: skin prep agent completely dried prior to procedure  Sterile barriers: all five maximum sterile barriers used - cap, mask, sterile gown, sterile gloves, and large sterile sheet  Hand hygiene: hand hygiene performed prior to central venous catheter insertion  Location details: left internal jugular  Catheter type: triple lumen  Catheter size: 12 Fr  Catheter Length: 20cm    Ultrasound guidance: yes  Vessel Caliber: large, patent, compressibility normal  Needle advanced into vessel with real time Ultrasound guidance.  Guidewire confirmed in vessel.  Image recorded and saved.  Sterile sheath used.  Manometry: No   Number of attempts: 1  Assessment: placement verified by x-ray,  no pneumothorax on x-ray and successful placement  Complications: none  Estimated blood " loss (mL): 1  Specimens: No  Implants: No  Post-procedure: line sutured,  chlorhexidine patch,  sterile dressing applied and blood return through all ports  Complications: No          Negrita Cordova  1/16/2020

## 2020-01-16 NOTE — ASSESSMENT & PLAN NOTE
"BG goal 140 - 180   BG is now above goal ranges.     Start Levemir 20 units daily. (0.4 u/kg weight based dosing)  Will consider adding Novolog if diet has advanced.  Low Dose SQ Insulin Correction Scale every 4 hours while NPO.   BG monitoring every 4 hours while NPO.     ** Please call Endocrine for any BG related issues **  ** Please notify Endocrine for any change and/or advance in diet**      Discharge Planning:     TBD. Please notify endocrinology prior to discharge. Patient will need adjustments to home insulin regimen.     Tentative:     Levemir 20 units HS.     Novolog 7 units TIDWM.     Low Dose SQ Insulin Correction Scale.    BG Monitoring AC/HS     PRN Insurance preferred diabetes testing supplies    PRN Ultra-Fine Junie Pen Needles 4mm x 32 G (5/32" x 0.23mm).     [place] [facility] [place] continued DM management.        "

## 2020-01-17 LAB
ALBUMIN SERPL BCP-MCNC: 3 G/DL (ref 3.5–5.2)
ALBUMIN SERPL BCP-MCNC: 3 G/DL (ref 3.5–5.2)
ALBUMIN SERPL BCP-MCNC: 3.3 G/DL (ref 3.5–5.2)
ALBUMIN SERPL BCP-MCNC: 3.3 G/DL (ref 3.5–5.2)
ALBUMIN SERPL BCP-MCNC: 3.4 G/DL (ref 3.5–5.2)
ALBUMIN SERPL BCP-MCNC: 3.6 G/DL (ref 3.5–5.2)
ALBUMIN SERPL BCP-MCNC: 3.6 G/DL (ref 3.5–5.2)
ALLENS TEST: ABNORMAL
ALLENS TEST: NORMAL
ALP SERPL-CCNC: 109 U/L (ref 55–135)
ALP SERPL-CCNC: 117 U/L (ref 55–135)
ALP SERPL-CCNC: 117 U/L (ref 55–135)
ALP SERPL-CCNC: 120 U/L (ref 55–135)
ALT SERPL W/O P-5'-P-CCNC: 1038 U/L (ref 10–44)
ALT SERPL W/O P-5'-P-CCNC: 1115 U/L (ref 10–44)
ALT SERPL W/O P-5'-P-CCNC: 1225 U/L (ref 10–44)
ALT SERPL W/O P-5'-P-CCNC: 919 U/L (ref 10–44)
ANION GAP SERPL CALC-SCNC: 11 MMOL/L (ref 8–16)
ANION GAP SERPL CALC-SCNC: 14 MMOL/L (ref 8–16)
ANION GAP SERPL CALC-SCNC: 9 MMOL/L (ref 8–16)
ANION GAP SERPL CALC-SCNC: 9 MMOL/L (ref 8–16)
APTT BLDCRRT: 24.8 SEC (ref 21–32)
APTT BLDCRRT: 24.9 SEC (ref 21–32)
ASCENDING AORTA: 3.29 CM
AST SERPL-CCNC: 1212 U/L (ref 10–40)
AST SERPL-CCNC: 481 U/L (ref 10–40)
AST SERPL-CCNC: 669 U/L (ref 10–40)
AST SERPL-CCNC: 877 U/L (ref 10–40)
AV INDEX (PROSTH): 0.56
AV MEAN GRADIENT: 7 MMHG
AV PEAK GRADIENT: 14 MMHG
AV REGURGITATION PRESSURE HALF TIME: 634 MS
AV VALVE AREA: 1.61 CM2
AV VELOCITY RATIO: 0.59
BASOPHILS # BLD AUTO: 0.02 K/UL (ref 0–0.2)
BASOPHILS # BLD AUTO: 0.03 K/UL (ref 0–0.2)
BASOPHILS # BLD AUTO: 0.03 K/UL (ref 0–0.2)
BASOPHILS # BLD AUTO: 0.04 K/UL (ref 0–0.2)
BASOPHILS NFR BLD: 0.1 % (ref 0–1.9)
BASOPHILS NFR BLD: 0.2 % (ref 0–1.9)
BILIRUB SERPL-MCNC: 2.2 MG/DL (ref 0.1–1)
BILIRUB SERPL-MCNC: 2.2 MG/DL (ref 0.1–1)
BILIRUB SERPL-MCNC: 2.4 MG/DL (ref 0.1–1)
BILIRUB SERPL-MCNC: 2.5 MG/DL (ref 0.1–1)
BSA FOR ECHO PROCEDURE: 2.13 M2
BUN SERPL-MCNC: 17 MG/DL (ref 6–20)
BUN SERPL-MCNC: 22 MG/DL (ref 6–20)
BUN SERPL-MCNC: 7 MG/DL (ref 6–20)
BUN SERPL-MCNC: 7 MG/DL (ref 6–20)
BUN SERPL-MCNC: 9 MG/DL (ref 6–20)
BUN SERPL-MCNC: 9 MG/DL (ref 6–20)
CALCIUM SERPL-MCNC: 8.2 MG/DL (ref 8.7–10.5)
CALCIUM SERPL-MCNC: 8.4 MG/DL (ref 8.7–10.5)
CALCIUM SERPL-MCNC: 8.4 MG/DL (ref 8.7–10.5)
CALCIUM SERPL-MCNC: 8.5 MG/DL (ref 8.7–10.5)
CALCIUM SERPL-MCNC: 8.5 MG/DL (ref 8.7–10.5)
CALCIUM SERPL-MCNC: 8.9 MG/DL (ref 8.7–10.5)
CHLORIDE SERPL-SCNC: 104 MMOL/L (ref 95–110)
CHLORIDE SERPL-SCNC: 105 MMOL/L (ref 95–110)
CO2 SERPL-SCNC: 21 MMOL/L (ref 23–29)
CO2 SERPL-SCNC: 23 MMOL/L (ref 23–29)
CO2 SERPL-SCNC: 25 MMOL/L (ref 23–29)
CO2 SERPL-SCNC: 25 MMOL/L (ref 23–29)
CREAT SERPL-MCNC: 1 MG/DL (ref 0.5–1.4)
CREAT SERPL-MCNC: 1 MG/DL (ref 0.5–1.4)
CREAT SERPL-MCNC: 1.1 MG/DL (ref 0.5–1.4)
CREAT SERPL-MCNC: 1.1 MG/DL (ref 0.5–1.4)
CREAT SERPL-MCNC: 1.7 MG/DL (ref 0.5–1.4)
CREAT SERPL-MCNC: 1.9 MG/DL (ref 0.5–1.4)
CV ECHO LV RWT: 0.43 CM
DELSYS: ABNORMAL
DIFFERENTIAL METHOD: ABNORMAL
DOP CALC AO PEAK VEL: 1.84 M/S
DOP CALC AO VTI: 24.8 CM
DOP CALC LVOT AREA: 2.9 CM2
DOP CALC LVOT DIAMETER: 1.91 CM
DOP CALC LVOT PEAK VEL: 1.08 M/S
DOP CALC LVOT STROKE VOLUME: 39.89 CM3
DOP CALCLVOT PEAK VEL VTI: 13.93 CM
E WAVE DECELERATION TIME: 151.65 MSEC
E/A RATIO: 1.56
E/E' RATIO: 8.24 M/S
ECHO LV POSTERIOR WALL: 1.07 CM (ref 0.6–1.1)
EOSINOPHIL # BLD AUTO: 0 K/UL (ref 0–0.5)
EOSINOPHIL NFR BLD: 0 % (ref 0–8)
EOSINOPHIL NFR BLD: 0.1 % (ref 0–8)
ERYTHROCYTE [DISTWIDTH] IN BLOOD BY AUTOMATED COUNT: 16.8 % (ref 11.5–14.5)
ERYTHROCYTE [DISTWIDTH] IN BLOOD BY AUTOMATED COUNT: 17.1 % (ref 11.5–14.5)
ERYTHROCYTE [DISTWIDTH] IN BLOOD BY AUTOMATED COUNT: 17.2 % (ref 11.5–14.5)
ERYTHROCYTE [DISTWIDTH] IN BLOOD BY AUTOMATED COUNT: 17.3 % (ref 11.5–14.5)
ERYTHROCYTE [SEDIMENTATION RATE] IN BLOOD BY WESTERGREN METHOD: 24 MM/H
EST. GFR  (AFRICAN AMERICAN): 34.2 ML/MIN/1.73 M^2
EST. GFR  (AFRICAN AMERICAN): 39.1 ML/MIN/1.73 M^2
EST. GFR  (AFRICAN AMERICAN): >60 ML/MIN/1.73 M^2
EST. GFR  (NON AFRICAN AMERICAN): 29.7 ML/MIN/1.73 M^2
EST. GFR  (NON AFRICAN AMERICAN): 33.9 ML/MIN/1.73 M^2
EST. GFR  (NON AFRICAN AMERICAN): 57.5 ML/MIN/1.73 M^2
EST. GFR  (NON AFRICAN AMERICAN): 57.5 ML/MIN/1.73 M^2
EST. GFR  (NON AFRICAN AMERICAN): >60 ML/MIN/1.73 M^2
EST. GFR  (NON AFRICAN AMERICAN): >60 ML/MIN/1.73 M^2
FIO2: 60
FRACTIONAL SHORTENING: 20 % (ref 28–44)
GLUCOSE SERPL-MCNC: 105 MG/DL (ref 70–110)
GLUCOSE SERPL-MCNC: 105 MG/DL (ref 70–110)
GLUCOSE SERPL-MCNC: 129 MG/DL (ref 70–110)
GLUCOSE SERPL-MCNC: 129 MG/DL (ref 70–110)
GLUCOSE SERPL-MCNC: 148 MG/DL (ref 70–110)
GLUCOSE SERPL-MCNC: 171 MG/DL (ref 70–110)
HCO3 UR-SCNC: 20 MMOL/L (ref 24–28)
HCO3 UR-SCNC: 22.2 MMOL/L (ref 24–28)
HCO3 UR-SCNC: 22.5 MMOL/L (ref 24–28)
HCO3 UR-SCNC: 22.6 MMOL/L (ref 24–28)
HCO3 UR-SCNC: 22.6 MMOL/L (ref 24–28)
HCO3 UR-SCNC: 22.8 MMOL/L (ref 24–28)
HCO3 UR-SCNC: 23 MMOL/L (ref 24–28)
HCO3 UR-SCNC: 23 MMOL/L (ref 24–28)
HCO3 UR-SCNC: 23.4 MMOL/L (ref 24–28)
HCO3 UR-SCNC: 23.5 MMOL/L (ref 24–28)
HCO3 UR-SCNC: 23.6 MMOL/L (ref 24–28)
HCO3 UR-SCNC: 23.7 MMOL/L (ref 24–28)
HCO3 UR-SCNC: 24 MMOL/L (ref 24–28)
HCO3 UR-SCNC: 24.2 MMOL/L (ref 24–28)
HCO3 UR-SCNC: 24.2 MMOL/L (ref 24–28)
HCO3 UR-SCNC: 24.4 MMOL/L (ref 24–28)
HCT VFR BLD AUTO: 27.8 % (ref 37–48.5)
HCT VFR BLD AUTO: 28.7 % (ref 37–48.5)
HCT VFR BLD AUTO: 28.9 % (ref 37–48.5)
HCT VFR BLD AUTO: 29 % (ref 37–48.5)
HCT VFR BLD CALC: 22 %PCV (ref 36–54)
HCT VFR BLD CALC: 22 %PCV (ref 36–54)
HCT VFR BLD CALC: 24 %PCV (ref 36–54)
HCT VFR BLD CALC: 25 %PCV (ref 36–54)
HCT VFR BLD CALC: 25 %PCV (ref 36–54)
HCT VFR BLD CALC: 26 %PCV (ref 36–54)
HCT VFR BLD CALC: 27 %PCV (ref 36–54)
HCT VFR BLD CALC: 28 %PCV (ref 36–54)
HCT VFR BLD CALC: 30 %PCV (ref 36–54)
HGB BLD-MCNC: 8.9 G/DL (ref 12–16)
HGB BLD-MCNC: 9.1 G/DL (ref 12–16)
HGB BLD-MCNC: 9.2 G/DL (ref 12–16)
HGB BLD-MCNC: 9.2 G/DL (ref 12–16)
IMM GRANULOCYTES # BLD AUTO: 0.16 K/UL (ref 0–0.04)
IMM GRANULOCYTES # BLD AUTO: 0.23 K/UL (ref 0–0.04)
IMM GRANULOCYTES # BLD AUTO: 0.32 K/UL (ref 0–0.04)
IMM GRANULOCYTES # BLD AUTO: 0.44 K/UL (ref 0–0.04)
IMM GRANULOCYTES NFR BLD AUTO: 0.9 % (ref 0–0.5)
IMM GRANULOCYTES NFR BLD AUTO: 1.2 % (ref 0–0.5)
IMM GRANULOCYTES NFR BLD AUTO: 1.6 % (ref 0–0.5)
IMM GRANULOCYTES NFR BLD AUTO: 2.2 % (ref 0–0.5)
INR PPP: 1.1 (ref 0.8–1.2)
INR PPP: 1.2 (ref 0.8–1.2)
INTERVENTRICULAR SEPTUM: 0.91 CM (ref 0.6–1.1)
IVRT: 0.07 MSEC
LA MAJOR: 6.4 CM
LA MINOR: 2.7 CM
LA WIDTH: 4.37 CM
LDH SERPL L TO P-CCNC: 0.71 MMOL/L (ref 0.36–1.25)
LDH SERPL L TO P-CCNC: 0.72 MMOL/L (ref 0.36–1.25)
LDH SERPL L TO P-CCNC: 0.78 MMOL/L (ref 0.36–1.25)
LDH SERPL L TO P-CCNC: 0.79 MMOL/L (ref 0.36–1.25)
LDH SERPL L TO P-CCNC: 0.82 MMOL/L (ref 0.36–1.25)
LDH SERPL L TO P-CCNC: 0.94 MMOL/L (ref 0.36–1.25)
LDH SERPL L TO P-CCNC: 1.02 MMOL/L (ref 0.36–1.25)
LDH SERPL L TO P-CCNC: 1.14 MMOL/L (ref 0.36–1.25)
LDH SERPL L TO P-CCNC: 1.17 MMOL/L (ref 0.36–1.25)
LDH SERPL L TO P-CCNC: 1.3 MMOL/L (ref 0.36–1.25)
LDH SERPL L TO P-CCNC: 1.31 MMOL/L (ref 0.36–1.25)
LDH SERPL L TO P-CCNC: 1.4 MMOL/L (ref 0.36–1.25)
LDH SERPL L TO P-CCNC: 1.41 MMOL/L (ref 0.36–1.25)
LDH SERPL L TO P-CCNC: 1.43 MMOL/L (ref 0.36–1.25)
LDH SERPL L TO P-CCNC: 1.68 MMOL/L (ref 0.36–1.25)
LEFT ATRIUM SIZE: 4.35 CM
LEFT ATRIUM VOLUME INDEX: 30 ML/M2
LEFT ATRIUM VOLUME: 61.37 CM3
LEFT INTERNAL DIMENSION IN SYSTOLE: 4 CM (ref 2.1–4)
LEFT VENTRICLE DIASTOLIC VOLUME INDEX: 48.02 ML/M2
LEFT VENTRICLE DIASTOLIC VOLUME: 98.18 ML
LEFT VENTRICLE MASS INDEX: 88 G/M2
LEFT VENTRICLE SYSTOLIC VOLUME INDEX: 28.1 ML/M2
LEFT VENTRICLE SYSTOLIC VOLUME: 57.42 ML
LEFT VENTRICULAR INTERNAL DIMENSION IN DIASTOLE: 5 CM (ref 3.5–6)
LEFT VENTRICULAR MASS: 179.54 G
LV LATERAL E/E' RATIO: 7.78 M/S
LV SEPTAL E/E' RATIO: 8.75 M/S
LYMPHOCYTES # BLD AUTO: 0.7 K/UL (ref 1–4.8)
LYMPHOCYTES # BLD AUTO: 0.8 K/UL (ref 1–4.8)
LYMPHOCYTES # BLD AUTO: 0.9 K/UL (ref 1–4.8)
LYMPHOCYTES # BLD AUTO: 0.9 K/UL (ref 1–4.8)
LYMPHOCYTES NFR BLD: 3.7 % (ref 18–48)
LYMPHOCYTES NFR BLD: 4.1 % (ref 18–48)
LYMPHOCYTES NFR BLD: 4.8 % (ref 18–48)
LYMPHOCYTES NFR BLD: 5 % (ref 18–48)
MAGNESIUM SERPL-MCNC: 1.9 MG/DL (ref 1.6–2.6)
MAGNESIUM SERPL-MCNC: 2 MG/DL (ref 1.6–2.6)
MAGNESIUM SERPL-MCNC: 2.1 MG/DL (ref 1.6–2.6)
MAGNESIUM SERPL-MCNC: 2.2 MG/DL (ref 1.6–2.6)
MCH RBC QN AUTO: 29.3 PG (ref 27–31)
MCH RBC QN AUTO: 29.9 PG (ref 27–31)
MCH RBC QN AUTO: 30.1 PG (ref 27–31)
MCH RBC QN AUTO: 30.8 PG (ref 27–31)
MCHC RBC AUTO-ENTMCNC: 30.8 G/DL (ref 32–36)
MCHC RBC AUTO-ENTMCNC: 31.7 G/DL (ref 32–36)
MCHC RBC AUTO-ENTMCNC: 31.7 G/DL (ref 32–36)
MCHC RBC AUTO-ENTMCNC: 33.1 G/DL (ref 32–36)
MCV RBC AUTO: 93 FL (ref 82–98)
MCV RBC AUTO: 94 FL (ref 82–98)
MCV RBC AUTO: 95 FL (ref 82–98)
MCV RBC AUTO: 95 FL (ref 82–98)
METHEMOGLOBIN: 1.2 % (ref 0–3)
MODE: ABNORMAL
MONOCYTES # BLD AUTO: 0.8 K/UL (ref 0.3–1)
MONOCYTES # BLD AUTO: 1.2 K/UL (ref 0.3–1)
MONOCYTES # BLD AUTO: 1.2 K/UL (ref 0.3–1)
MONOCYTES # BLD AUTO: 1.3 K/UL (ref 0.3–1)
MONOCYTES NFR BLD: 4.5 % (ref 4–15)
MONOCYTES NFR BLD: 5.8 % (ref 4–15)
MONOCYTES NFR BLD: 6.3 % (ref 4–15)
MONOCYTES NFR BLD: 6.3 % (ref 4–15)
MV PEAK A VEL: 0.45 M/S
MV PEAK E VEL: 0.7 M/S
NEUTROPHILS # BLD AUTO: 16 K/UL (ref 1.8–7.7)
NEUTROPHILS # BLD AUTO: 16.2 K/UL (ref 1.8–7.7)
NEUTROPHILS # BLD AUTO: 17.6 K/UL (ref 1.8–7.7)
NEUTROPHILS # BLD AUTO: 17.8 K/UL (ref 1.8–7.7)
NEUTROPHILS NFR BLD: 87.4 % (ref 38–73)
NEUTROPHILS NFR BLD: 87.7 % (ref 38–73)
NEUTROPHILS NFR BLD: 88.1 % (ref 38–73)
NEUTROPHILS NFR BLD: 89.4 % (ref 38–73)
NRBC BLD-RTO: 0 /100 WBC
PCO2 BLDA: 37.2 MMHG (ref 35–45)
PCO2 BLDA: 37.7 MMHG (ref 35–45)
PCO2 BLDA: 37.7 MMHG (ref 35–45)
PCO2 BLDA: 38.1 MMHG (ref 35–45)
PCO2 BLDA: 38.2 MMHG (ref 35–45)
PCO2 BLDA: 38.2 MMHG (ref 35–45)
PCO2 BLDA: 38.5 MMHG (ref 35–45)
PCO2 BLDA: 38.6 MMHG (ref 35–45)
PCO2 BLDA: 38.7 MMHG (ref 35–45)
PCO2 BLDA: 39.5 MMHG (ref 35–45)
PCO2 BLDA: 39.7 MMHG (ref 35–45)
PCO2 BLDA: 39.7 MMHG (ref 35–45)
PCO2 BLDA: 40.7 MMHG (ref 35–45)
PCO2 BLDA: 40.8 MMHG (ref 35–45)
PCO2 BLDA: 41.6 MMHG (ref 35–45)
PCO2 BLDA: 47.7 MMHG (ref 35–45)
PCO2 BLDA: 49.1 MMHG (ref 35–45)
PCO2 BLDA: 50.4 MMHG (ref 35–45)
PEEP: 8
PH SMN: 7.28 [PH] (ref 7.35–7.45)
PH SMN: 7.29 [PH] (ref 7.35–7.45)
PH SMN: 7.3 [PH] (ref 7.35–7.45)
PH SMN: 7.31 [PH] (ref 7.35–7.45)
PH SMN: 7.34 [PH] (ref 7.35–7.45)
PH SMN: 7.36 [PH] (ref 7.35–7.45)
PH SMN: 7.37 [PH] (ref 7.35–7.45)
PH SMN: 7.38 [PH] (ref 7.35–7.45)
PH SMN: 7.39 [PH] (ref 7.35–7.45)
PH SMN: 7.4 [PH] (ref 7.35–7.45)
PH SMN: 7.41 [PH] (ref 7.35–7.45)
PHOSPHATE SERPL-MCNC: 2.3 MG/DL (ref 2.7–4.5)
PHOSPHATE SERPL-MCNC: 2.9 MG/DL (ref 2.7–4.5)
PHOSPHATE SERPL-MCNC: 3.3 MG/DL (ref 2.7–4.5)
PHOSPHATE SERPL-MCNC: 3.5 MG/DL (ref 2.7–4.5)
PISA TR MAX VEL: 2.26 M/S
PLATELET # BLD AUTO: 142 K/UL (ref 150–350)
PLATELET # BLD AUTO: 145 K/UL (ref 150–350)
PLATELET # BLD AUTO: 152 K/UL (ref 150–350)
PLATELET # BLD AUTO: 165 K/UL (ref 150–350)
PMV BLD AUTO: 10.9 FL (ref 9.2–12.9)
PMV BLD AUTO: 11.1 FL (ref 9.2–12.9)
PMV BLD AUTO: 11.2 FL (ref 9.2–12.9)
PMV BLD AUTO: 11.4 FL (ref 9.2–12.9)
PO2 BLDA: 100 MMHG (ref 80–100)
PO2 BLDA: 102 MMHG (ref 80–100)
PO2 BLDA: 103 MMHG (ref 80–100)
PO2 BLDA: 105 MMHG (ref 80–100)
PO2 BLDA: 106 MMHG (ref 80–100)
PO2 BLDA: 124 MMHG (ref 80–100)
PO2 BLDA: 135 MMHG (ref 80–100)
PO2 BLDA: 50 MMHG (ref 80–100)
PO2 BLDA: 51 MMHG (ref 80–100)
PO2 BLDA: 58 MMHG (ref 80–100)
PO2 BLDA: 59 MMHG (ref 80–100)
PO2 BLDA: 62 MMHG (ref 80–100)
PO2 BLDA: 66 MMHG (ref 80–100)
PO2 BLDA: 70 MMHG (ref 80–100)
PO2 BLDA: 72 MMHG (ref 80–100)
PO2 BLDA: 82 MMHG (ref 80–100)
POC BE: -1 MMOL/L
POC BE: -2 MMOL/L
POC BE: -3 MMOL/L
POC BE: -4 MMOL/L
POC BE: -6 MMOL/L
POC BE: 0 MMOL/L
POC IONIZED CALCIUM: 1 MMOL/L (ref 1.06–1.42)
POC IONIZED CALCIUM: 1.03 MMOL/L (ref 1.06–1.42)
POC IONIZED CALCIUM: 1.07 MMOL/L (ref 1.06–1.42)
POC IONIZED CALCIUM: 1.08 MMOL/L (ref 1.06–1.42)
POC IONIZED CALCIUM: 1.09 MMOL/L (ref 1.06–1.42)
POC IONIZED CALCIUM: 1.1 MMOL/L (ref 1.06–1.42)
POC IONIZED CALCIUM: 1.11 MMOL/L (ref 1.06–1.42)
POC IONIZED CALCIUM: 1.12 MMOL/L (ref 1.06–1.42)
POC IONIZED CALCIUM: 1.12 MMOL/L (ref 1.06–1.42)
POC SATURATED O2: 85 % (ref 95–100)
POC SATURATED O2: 85 % (ref 95–100)
POC SATURATED O2: 89 % (ref 95–100)
POC SATURATED O2: 89 % (ref 95–100)
POC SATURATED O2: 90 % (ref 95–100)
POC SATURATED O2: 90 % (ref 95–100)
POC SATURATED O2: 91 % (ref 95–100)
POC SATURATED O2: 93 % (ref 95–100)
POC SATURATED O2: 94 % (ref 95–100)
POC SATURATED O2: 94 % (ref 95–100)
POC SATURATED O2: 96 % (ref 95–100)
POC SATURATED O2: 97 % (ref 95–100)
POC SATURATED O2: 98 % (ref 95–100)
POC SATURATED O2: 99 % (ref 95–100)
POC TCO2: 21 MMOL/L (ref 23–27)
POC TCO2: 23 MMOL/L (ref 23–27)
POC TCO2: 24 MMOL/L (ref 23–27)
POC TCO2: 25 MMOL/L (ref 23–27)
POC TCO2: 26 MMOL/L (ref 23–27)
POC TCO2: 26 MMOL/L (ref 23–27)
POCT GLUCOSE: 109 MG/DL (ref 70–110)
POCT GLUCOSE: 135 MG/DL (ref 70–110)
POCT GLUCOSE: 148 MG/DL (ref 70–110)
POCT GLUCOSE: 155 MG/DL (ref 70–110)
POCT GLUCOSE: 215 MG/DL (ref 70–110)
POTASSIUM BLD-SCNC: 4 MMOL/L (ref 3.5–5.1)
POTASSIUM BLD-SCNC: 4.1 MMOL/L (ref 3.5–5.1)
POTASSIUM BLD-SCNC: 4.3 MMOL/L (ref 3.5–5.1)
POTASSIUM BLD-SCNC: 4.3 MMOL/L (ref 3.5–5.1)
POTASSIUM BLD-SCNC: 4.4 MMOL/L (ref 3.5–5.1)
POTASSIUM BLD-SCNC: 4.5 MMOL/L (ref 3.5–5.1)
POTASSIUM BLD-SCNC: 4.6 MMOL/L (ref 3.5–5.1)
POTASSIUM BLD-SCNC: 4.6 MMOL/L (ref 3.5–5.1)
POTASSIUM SERPL-SCNC: 4.4 MMOL/L (ref 3.5–5.1)
POTASSIUM SERPL-SCNC: 4.4 MMOL/L (ref 3.5–5.1)
POTASSIUM SERPL-SCNC: 4.6 MMOL/L (ref 3.5–5.1)
POTASSIUM SERPL-SCNC: 4.7 MMOL/L (ref 3.5–5.1)
PROT SERPL-MCNC: 6.4 G/DL (ref 6–8.4)
PROT SERPL-MCNC: 6.6 G/DL (ref 6–8.4)
PROT SERPL-MCNC: 6.6 G/DL (ref 6–8.4)
PROT SERPL-MCNC: 6.8 G/DL (ref 6–8.4)
PROTHROMBIN TIME: 11.6 SEC (ref 9–12.5)
PROTHROMBIN TIME: 11.7 SEC (ref 9–12.5)
PROTHROMBIN TIME: 12.1 SEC (ref 9–12.5)
PROTHROMBIN TIME: 12.2 SEC (ref 9–12.5)
PULM VEIN S/D RATIO: 0.79
PV PEAK D VEL: 0.52 M/S
PV PEAK S VEL: 0.41 M/S
RA MAJOR: 5.98 CM
RA WIDTH: 4.91 CM
RBC # BLD AUTO: 2.99 M/UL (ref 4–5.4)
RBC # BLD AUTO: 3.02 M/UL (ref 4–5.4)
RBC # BLD AUTO: 3.04 M/UL (ref 4–5.4)
RBC # BLD AUTO: 3.08 M/UL (ref 4–5.4)
RIGHT VENTRICULAR END-DIASTOLIC DIMENSION: 3.61 CM
RV TISSUE DOPPLER FREE WALL SYSTOLIC VELOCITY 1 (APICAL 4 CHAMBER VIEW): 9.73 CM/S
SAMPLE: ABNORMAL
SAMPLE: NORMAL
SINUS: 2.91 CM
SITE: ABNORMAL
SITE: NORMAL
SODIUM BLD-SCNC: 138 MMOL/L (ref 136–145)
SODIUM BLD-SCNC: 139 MMOL/L (ref 136–145)
SODIUM BLD-SCNC: 140 MMOL/L (ref 136–145)
SODIUM BLD-SCNC: 140 MMOL/L (ref 136–145)
SODIUM BLD-SCNC: 141 MMOL/L (ref 136–145)
SODIUM SERPL-SCNC: 139 MMOL/L (ref 136–145)
STJ: 2.71 CM
TDI LATERAL: 0.09 M/S
TDI SEPTAL: 0.08 M/S
TDI: 0.09 M/S
TR MAX PG: 20 MMHG
TRICUSPID ANNULAR PLANE SYSTOLIC EXCURSION: 1.37 CM
VT: 325
WBC # BLD AUTO: 17.87 K/UL (ref 3.9–12.7)
WBC # BLD AUTO: 18.57 K/UL (ref 3.9–12.7)
WBC # BLD AUTO: 19.9 K/UL (ref 3.9–12.7)
WBC # BLD AUTO: 20.29 K/UL (ref 3.9–12.7)

## 2020-01-17 PROCEDURE — 90945 DIALYSIS ONE EVALUATION: CPT | Mod: ,,, | Performed by: NURSE PRACTITIONER

## 2020-01-17 PROCEDURE — 37799 UNLISTED PX VASCULAR SURGERY: CPT

## 2020-01-17 PROCEDURE — 80053 COMPREHEN METABOLIC PANEL: CPT | Mod: 91

## 2020-01-17 PROCEDURE — 82803 BLOOD GASES ANY COMBINATION: CPT

## 2020-01-17 PROCEDURE — 84132 ASSAY OF SERUM POTASSIUM: CPT

## 2020-01-17 PROCEDURE — 99233 PR SUBSEQUENT HOSPITAL CARE,LEVL III: ICD-10-PCS | Mod: ,,, | Performed by: SURGERY

## 2020-01-17 PROCEDURE — 63600367 HC NITRIC OXIDE PER HOUR

## 2020-01-17 PROCEDURE — 80069 RENAL FUNCTION PANEL: CPT | Mod: 91

## 2020-01-17 PROCEDURE — 80100008 HC CRRT DAILY MAINTENANCE

## 2020-01-17 PROCEDURE — 90945 PR DIALYSIS, NOT HEMO, 1 EVAL: ICD-10-PCS | Mod: ,,, | Performed by: NURSE PRACTITIONER

## 2020-01-17 PROCEDURE — 80053 COMPREHEN METABOLIC PANEL: CPT

## 2020-01-17 PROCEDURE — 85730 THROMBOPLASTIN TIME PARTIAL: CPT | Mod: 91

## 2020-01-17 PROCEDURE — 36415 COLL VENOUS BLD VENIPUNCTURE: CPT

## 2020-01-17 PROCEDURE — 83735 ASSAY OF MAGNESIUM: CPT | Mod: 91

## 2020-01-17 PROCEDURE — 99900026 HC AIRWAY MAINTENANCE (STAT)

## 2020-01-17 PROCEDURE — 83605 ASSAY OF LACTIC ACID: CPT

## 2020-01-17 PROCEDURE — 94003 VENT MGMT INPAT SUBQ DAY: CPT

## 2020-01-17 PROCEDURE — C9113 INJ PANTOPRAZOLE SODIUM, VIA: HCPCS | Performed by: SURGERY

## 2020-01-17 PROCEDURE — 99233 SBSQ HOSP IP/OBS HIGH 50: CPT | Mod: ,,, | Performed by: SURGERY

## 2020-01-17 PROCEDURE — 27200966 HC CLOSED SUCTION SYSTEM

## 2020-01-17 PROCEDURE — 20000000 HC ICU ROOM

## 2020-01-17 PROCEDURE — 83735 ASSAY OF MAGNESIUM: CPT

## 2020-01-17 PROCEDURE — 63600175 PHARM REV CODE 636 W HCPCS: Performed by: SURGERY

## 2020-01-17 PROCEDURE — 25000003 PHARM REV CODE 250: Performed by: SURGERY

## 2020-01-17 PROCEDURE — 25000003 PHARM REV CODE 250: Performed by: NURSE PRACTITIONER

## 2020-01-17 PROCEDURE — 85610 PROTHROMBIN TIME: CPT | Mod: 91

## 2020-01-17 PROCEDURE — 85014 HEMATOCRIT: CPT

## 2020-01-17 PROCEDURE — 85610 PROTHROMBIN TIME: CPT

## 2020-01-17 PROCEDURE — 85025 COMPLETE CBC W/AUTO DIFF WBC: CPT | Mod: 91

## 2020-01-17 PROCEDURE — 90945 DIALYSIS ONE EVALUATION: CPT

## 2020-01-17 PROCEDURE — 84295 ASSAY OF SERUM SODIUM: CPT

## 2020-01-17 PROCEDURE — 63600175 PHARM REV CODE 636 W HCPCS: Performed by: STUDENT IN AN ORGANIZED HEALTH CARE EDUCATION/TRAINING PROGRAM

## 2020-01-17 PROCEDURE — 94761 N-INVAS EAR/PLS OXIMETRY MLT: CPT

## 2020-01-17 PROCEDURE — 99900035 HC TECH TIME PER 15 MIN (STAT)

## 2020-01-17 PROCEDURE — 97803 MED NUTRITION INDIV SUBSEQ: CPT

## 2020-01-17 PROCEDURE — 82330 ASSAY OF CALCIUM: CPT

## 2020-01-17 PROCEDURE — 63600175 PHARM REV CODE 636 W HCPCS: Performed by: NURSE PRACTITIONER

## 2020-01-17 PROCEDURE — 25000003 PHARM REV CODE 250: Performed by: STUDENT IN AN ORGANIZED HEALTH CARE EDUCATION/TRAINING PROGRAM

## 2020-01-17 PROCEDURE — 84100 ASSAY OF PHOSPHORUS: CPT

## 2020-01-17 PROCEDURE — 27000221 HC OXYGEN, UP TO 24 HOURS

## 2020-01-17 PROCEDURE — 80069 RENAL FUNCTION PANEL: CPT

## 2020-01-17 PROCEDURE — 63600175 PHARM REV CODE 636 W HCPCS: Performed by: THORACIC SURGERY (CARDIOTHORACIC VASCULAR SURGERY)

## 2020-01-17 RX ORDER — MAGNESIUM SULFATE HEPTAHYDRATE 40 MG/ML
2 INJECTION, SOLUTION INTRAVENOUS ONCE
Status: COMPLETED | OUTPATIENT
Start: 2020-01-17 | End: 2020-01-17

## 2020-01-17 RX ORDER — HYDROCODONE BITARTRATE AND ACETAMINOPHEN 500; 5 MG/1; MG/1
TABLET ORAL CONTINUOUS
Status: ACTIVE | OUTPATIENT
Start: 2020-01-17 | End: 2020-01-18

## 2020-01-17 RX ORDER — CHLORHEXIDINE GLUCONATE ORAL RINSE 1.2 MG/ML
15 SOLUTION DENTAL 2 TIMES DAILY
Status: CANCELLED | OUTPATIENT
Start: 2020-01-17

## 2020-01-17 RX ORDER — MAGNESIUM SULFATE HEPTAHYDRATE 40 MG/ML
2 INJECTION, SOLUTION INTRAVENOUS
Status: DISPENSED | OUTPATIENT
Start: 2020-01-17 | End: 2020-01-18

## 2020-01-17 RX ORDER — FENTANYL CITRATE-0.9 % NACL/PF 10 MCG/ML
PLASTIC BAG, INJECTION (ML) INTRAVENOUS CONTINUOUS
Status: DISCONTINUED | OUTPATIENT
Start: 2020-01-17 | End: 2020-01-21

## 2020-01-17 RX ADMIN — EPINEPHRINE 0.1 MCG/KG/MIN: 1 INJECTION PARENTERAL at 11:01

## 2020-01-17 RX ADMIN — CLOPIDOGREL BISULFATE 75 MG: 75 TABLET, FILM COATED ORAL at 08:01

## 2020-01-17 RX ADMIN — HEPARIN SODIUM 5000 UNITS: 5000 INJECTION, SOLUTION INTRAVENOUS; SUBCUTANEOUS at 02:01

## 2020-01-17 RX ADMIN — PANTOPRAZOLE SODIUM 40 MG: 40 INJECTION, POWDER, FOR SOLUTION INTRAVENOUS at 08:01

## 2020-01-17 RX ADMIN — EPINEPHRINE 0.06 MCG/KG/MIN: 1 INJECTION PARENTERAL at 02:01

## 2020-01-17 RX ADMIN — MAGNESIUM SULFATE IN WATER 2 G: 40 INJECTION, SOLUTION INTRAVENOUS at 05:01

## 2020-01-17 RX ADMIN — ASPIRIN 81 MG CHEWABLE TABLET 81 MG: 81 TABLET CHEWABLE at 08:01

## 2020-01-17 RX ADMIN — MUPIROCIN 1 G: 20 OINTMENT TOPICAL at 08:01

## 2020-01-17 RX ADMIN — SODIUM CHLORIDE: 0.9 INJECTION, SOLUTION INTRAVENOUS at 11:01

## 2020-01-17 RX ADMIN — INSULIN DETEMIR 20 UNITS: 100 INJECTION, SOLUTION SUBCUTANEOUS at 09:01

## 2020-01-17 RX ADMIN — CISATRACURIUM BESYLATE 1 MCG/KG/MIN: 10 INJECTION INTRAVENOUS at 09:01

## 2020-01-17 RX ADMIN — POLYETHYLENE GLYCOL 3350 17 G: 17 POWDER, FOR SOLUTION ORAL at 09:01

## 2020-01-17 RX ADMIN — HEPARIN SODIUM 5000 UNITS: 5000 INJECTION, SOLUTION INTRAVENOUS; SUBCUTANEOUS at 04:01

## 2020-01-17 RX ADMIN — HEPARIN SODIUM 5000 UNITS: 5000 INJECTION, SOLUTION INTRAVENOUS; SUBCUTANEOUS at 09:01

## 2020-01-17 RX ADMIN — PROPOFOL 35 MCG/KG/MIN: 10 INJECTION, EMULSION INTRAVENOUS at 09:01

## 2020-01-17 RX ADMIN — VASOPRESSIN 0.04 UNITS/MIN: 20 INJECTION INTRAVENOUS at 06:01

## 2020-01-17 RX ADMIN — MUPIROCIN 1 G: 20 OINTMENT TOPICAL at 09:01

## 2020-01-17 RX ADMIN — FENTANYL CITRATE 25 MCG/HR: 50 INJECTION, SOLUTION INTRAMUSCULAR; INTRAVENOUS at 06:01

## 2020-01-17 RX ADMIN — SODIUM PHOSPHATE, MONOBASIC, MONOHYDRATE 30 MMOL: 276; 142 INJECTION, SOLUTION INTRAVENOUS at 11:01

## 2020-01-17 RX ADMIN — PROPOFOL 35 MCG/KG/MIN: 10 INJECTION, EMULSION INTRAVENOUS at 04:01

## 2020-01-17 RX ADMIN — ATORVASTATIN CALCIUM 80 MG: 20 TABLET, FILM COATED ORAL at 09:01

## 2020-01-17 RX ADMIN — CISATRACURIUM BESYLATE 1 MCG/KG/MIN: 10 INJECTION INTRAVENOUS at 06:01

## 2020-01-17 NOTE — PLAN OF CARE
Plan of Care Note  Cardiothoracic Surgery    Suyapa Connelly is a 54 y.o. female with CAD, HF, DM, who underwent 1v off-pump CABG (1/14/20)    Specific issues: echo today, vent management, fluid status, monitor hepatic and renal labs    Plan of care for patient was discussed with ICU staff including nurses, residents, and faculty and appropriate consulting services.    Will continue to monitor patient's hemodynamics, functionality, neuro status, fluid status and renal function, and labs and will adjust medications and fluids as necessary while monitoring appropriateness for de-escalation of support and monitoring and transport to stepdown unit.    Christian Dobson MD  Cardiothoracic Surgery Fellow

## 2020-01-17 NOTE — PROGRESS NOTES
Dr. Lozano notified pt desatting despite max comfort flow settings. MD at bedside. Decision made to intubate pt and place Trialysis line.

## 2020-01-17 NOTE — SUBJECTIVE & OBJECTIVE
Interval History: Nephrology re-consulted for concerns for ROSLYN, decreased UOP, and volume overload. Patient s/p CABG (off pump) on 1/14/20. Patient intubated and trialysis in place. Inadequate response to diuretics. On vasopressors.      Review of patient's allergies indicates:   Allergen Reactions    Contrast media      Kidney injury    Pcn [penicillins]      Rash; tolerated ceftriaxone on 1/13/20     Current Facility-Administered Medications   Medication Frequency    0.9%  NaCl infusion (CRRT USE ONLY) Continuous    acetaminophen tablet 650 mg Q4H PRN    acetaminophen tablet 650 mg Q6H PRN    amiodarone 360 mg/200 mL (1.8 mg/mL) infusion Continuous    aspirin chewable tablet 81 mg Daily    aspirin tablet 325 mg Once    atorvastatin tablet 80 mg QHS    bisacodyl suppository 10 mg Q12H PRN    chlorothiazide (DIURIL) 500 mg in dextrose 5 % 50 mL IVPB Once    clopidogrel tablet 75 mg Daily    dextrose 10% (D10W) Bolus PRN    dextrose 10% (D10W) Bolus PRN    dextrose 5 % and 0.9 % NaCl with KCl 20 mEq infusion Continuous    DOBUTamine 500mg in D5W 250mL infusion (premix) (NON-TITRATING) Continuous    EPINEPHrine (ADRENALIN) 5 mg in sodium chloride 0.9% 250 mL infusion Continuous    furosemide (LASIX) 2 mg/mL in sodium chloride 0.9% 100 mL infusion (conc: 2 mg/mL) Continuous    glucagon (human recombinant) injection 1 mg PRN    heparin (porcine) injection 5,000 Units Q8H    HYDROmorphone injection 0.2 mg Q1H PRN    insulin aspart U-100 pen 0-5 Units Q4H PRN    insulin detemir U-100 pen 20 Units Daily    magnesium sulfate 2g in water 50mL IVPB (premix) PRN    metoclopramide HCl injection 10 mg Q6H PRN    mupirocin 2 % ointment 1 g BID    niCARdipine 40 mg/200 mL infusion Continuous    nitric oxide gas Gas 45 ppm Continuous    norepinephrine 4 mg in dextrose 5% 250 mL infusion (premix) (titrating) Continuous    ondansetron disintegrating tablet 8 mg Q8H PRN    oxyCODONE immediate release  tablet 10 mg Q4H PRN    oxyCODONE immediate release tablet 5 mg Q4H PRN    pantoprazole injection 40 mg Daily    phenylephrine HCl in 0.9% NaCl 1 mg/10 mL (100 mcg/mL) syringe     polyethylene glycol packet 17 g Daily    propofol (DIPRIVAN) 10 mg/mL infusion     propofol (DIPRIVAN) 10 mg/mL infusion Continuous    scopolamine 1.3-1.5 mg (1 mg over 3 days) 1 patch Q3 Days    sodium phosphate 20.01 mmol in dextrose 5 % 250 mL IVPB PRN    sodium phosphate 30 mmol in dextrose 5 % 250 mL IVPB PRN    sodium phosphate 39.99 mmol in dextrose 5 % 250 mL IVPB PRN    succinylcholine (ANECTINE) 20 mg/mL injection     vasopressin (PITRESSIN) 0.2 Units/mL in dextrose 5 % 100 mL infusion Continuous       Objective:     Vital Signs (Most Recent):  Temp: 99 °F (37.2 °C) (01/16/20 1500)  Pulse: 78 (01/16/20 1832)  Resp: 20 (01/16/20 1832)  BP: 139/65 (01/16/20 1800)  SpO2: (!) 92 % (01/16/20 1832)  O2 Device (Oxygen Therapy): ventilator (01/16/20 1800) Vital Signs (24h Range):  Temp:  [97.9 °F (36.6 °C)-99 °F (37.2 °C)] 99 °F (37.2 °C)  Pulse:  [57-80] 78  Resp:  [10-37] 20  SpO2:  [84 %-100 %] 92 %  BP: (100-144)/(56-77) 139/65  Arterial Line BP: ()/() 231/217     Weight: 101.1 kg (222 lb 14.2 oz) (01/15/20 0554)  Body mass index is 38.26 kg/m².  Body surface area is 2.14 meters squared.    I/O last 3 completed shifts:  In: 5260.7 [I.V.:3785.7; Blood:1250; IV Piggyback:225]  Out: 665 [Urine:395; Chest Tube:270]    Physical Exam   Constitutional: She appears well-developed and well-nourished.   HENT:   Head: Normocephalic and atraumatic.   Eyes: Pupils are equal, round, and reactive to light. Conjunctivae are normal.   Neck: Neck supple.   Cardiovascular: Normal rate.   Pulmonary/Chest: She has no wheezes. She has rales.   Intubated and mechanically ventilated. Chest tubes in place    Abdominal: Soft. She exhibits no distension and no mass. There is no guarding. No hernia.   Genitourinary:   Genitourinary  Comments: Kerns in place   Neurological:   Sedated   Skin: Skin is warm and dry.       Significant Labs:  CBC:   Recent Labs   Lab 01/16/20  1613  01/16/20  1800   WBC 18.53*  --   --    RBC 2.41*  --   --    HGB 7.7*  --   --    HCT 23.8*   < > 23*     --   --    MCV 99*  --   --    MCH 32.0*  --   --    MCHC 32.4  --   --     < > = values in this interval not displayed.     CMP:   Recent Labs   Lab 01/16/20  1613   *   CALCIUM 8.4*   ALBUMIN 3.7   PROT 6.6      K 5.2*   CO2 19*      BUN 62*   CREATININE 4.4*   ALKPHOS 94   ALT 1,329*   AST 1,990*   BILITOT 1.2*     All labs within the past 24 hours have been reviewed.

## 2020-01-17 NOTE — PROGRESS NOTES
[facility]  Critical Care - Surgery  Progress Note    Patient Name: Suyapa Connelly  MRN: [id number]  Admission Date: 1/2/2020  Hospital Length of Stay: 15 days  Code Status: Prior  Attending Provider: Huang Altamirano MD  Primary Care Provider: Erlinda Rahman NP   Principal Problem: Acute coronary syndrome    Subjective:     Hospital/ICU Course:  1/15: POD1. ON nausea without emesis. Zofran. 2L albumin, 5 amps bicarb. Clinically improving. 250 albumin running.   1/16: Increasing pressor requirements. Intubated for poor oxygenation, Left IJ trialysis placed for CRRT. Nitric added for RV support. Transfused pRBC x2 overnight.     Interval History/Significant Events: Patient with persistent poor oxygenation and desaturations yesterday afternoon. Also started requiring multiple vasopressors. Renal function also declined significantly. Pt was intubated and CRRT begun. High concern for cardiac dysfunction vs early ARDS.     Follow-up For: Procedure(s) (LRB):  CORONARY ARTERY BYPASS GRAFT (CABG) x 1     Off Pump (N/A)    Post-Operative Day: 3 Days Post-Op    Objective:     Vital Signs (Most Recent):  Temp: 98.3 °F (36.8 °C) (01/17/20 0700)  Pulse: 70 (01/17/20 1000)  Resp: 20 (01/17/20 1000)  BP: (!) 111/58 (01/17/20 1000)  SpO2: 99 % (01/17/20 1000) Vital Signs (24h Range):  Temp:  [97.9 °F (36.6 °C)-99 °F (37.2 °C)] 98.3 °F (36.8 °C)  Pulse:  [59-83] 70  Resp:  [10-37] 20  SpO2:  [83 %-100 %] 99 %  BP: ()/(52-69) 111/58  Arterial Line BP: ()/() 96/48     Weight: 100.7 kg (222 lb)  Body mass index is 38.11 kg/m².      Intake/Output Summary (Last 24 hours) at 1/17/2020 1022  Last data filed at 1/17/2020 1000  Gross per 24 hour   Intake 3806.25 ml   Output 4149 ml   Net -342.75 ml       Physical Exam   Constitutional: She appears well-developed and well-nourished. She is sedated and intubated.   HENT:   Head: Normocephalic and atraumatic.   Eyes: Pupils are equal, round, and  reactive to light.   Neck: Normal range of motion. Neck supple.   Bilateral CVC, dressings CDI   Cardiovascular: Normal rate and regular rhythm.   Pulmonary/Chest: She is intubated.   Mechanically ventilated   Abdominal: Soft. She exhibits no distension. There is no rebound.   Musculoskeletal: Normal range of motion. She exhibits edema. She exhibits no deformity.   Neurological:   sedated   Skin: Skin is warm and dry. No rash noted. No erythema.       Vents:  Vent Mode: A/C (01/17/20 0911)  Ventilator Initiated: Yes (01/16/20 1448)  Set Rate: 20 bmp (01/17/20 0911)  Vt Set: 325 mL (01/17/20 0911)  PEEP/CPAP: 12 cmH20 (01/17/20 0911)  Oxygen Concentration (%): 60 (01/17/20 1000)  Peak Airway Pressure: 27 cmH2O (01/17/20 0911)  Plateau Pressure: 0 cmH20 (01/17/20 0911)  Total Ve: 7.11 mL (01/17/20 0911)  F/VT Ratio<105 (RSBI): (!) 56.82 (01/17/20 0911)    Lines/Drains/Airways     Central Venous Catheter Line                 Percutaneous Central Line Insertion/Assessment - double lumen  01/14/20 0725 3 days         Percutaneous Central Line Insertion/Assessment - triple lumen  01/16/20 1515 left internal jugular less than 1 day          Drain                 Chest Tube 01/14/20 1017 1 Left Pleural 19 Fr. 3 days         Urethral Catheter 01/14/20 0720 Non-latex;Temperature probe;Straight-tip 14 Fr. 3 days         Y Chest Tube 1 and 2 01/14/20 1018 1 Anterior Mediastinal 19 Fr. 2 Anterior Mediastinal 19 Fr. 3 days         NG/OG Tube 01/16/20 1446 orogastric less than 1 day          Airway                 Airway - Non-Surgical 01/16/20 1442 Endotracheal Tube less than 1 day          Arterial Line                 Arterial Line 01/14/20 0701 Left Radial 3 days          Line                 Pacer Wires 01/14/20 1009 3 days                Significant Labs:    CBC/Anemia Profile:  Recent Labs   Lab 01/16/20  1613  01/17/20  0253  01/17/20  0808 01/17/20  0809 01/17/20  0910   WBC 18.53*  --  17.87*  --   --  19.90*  --    HGB  7.7*  --  9.2*  --   --  9.1*  --    HCT 23.8*   < > 29.0*   < > 27* 28.7* 22*     --  165  --   --  145*  --    MCV 99*  --  94  --   --  95  --    RDW 14.1  --  16.8*  --   --  17.1*  --     < > = values in this interval not displayed.        Chemistries:  Recent Labs   Lab 01/16/20 2127 01/17/20  0253 01/17/20  0809     137 139  139  139 139   K 4.9  4.9 4.6  4.6  4.6 4.7     104 104  104  104 105   CO2 20*  20* 21*  21*  21* 23   BUN 40*  40* 22*  22*  22* 17   CREATININE 2.9*  2.9* 1.9*  1.9*  1.9* 1.7*   CALCIUM 8.3*  8.3* 8.9  8.9  8.9 8.2*   ALBUMIN 3.7  3.7 3.6  3.6 3.4*   PROT 6.7 6.8 6.6   BILITOT 1.3* 2.2* 2.2*   ALKPHOS 104 109 117   ALT 1,304* 1,225* 1,115*   AST 1,576* 1,212* 877*   MG 1.9 1.9 2.2   PHOS 5.4* 3.3 3.5       ABGs:   Recent Labs   Lab 01/17/20  1022   PH 7.312*   PCO2 47.7*   HCO3 24.2   POCSATURATED 97   BE -2     Coagulation:   Recent Labs   Lab 01/17/20  0810   INR 1.2   APTT 24.8     Lactic Acid: No results for input(s): LACTATE in the last 48 hours.    Significant Imaging:  I have reviewed all pertinent imaging results/findings within the past 24 hours.    Assessment/Plan:     Acute on chronic combined systolic (congestive) and diastolic (congestive) heart failure  Neuro:   - sedated with fentanyl/propofol     Pulmonary:   - Worsening respiratory distress, intubated 1/16  - Wean vent settings  - Daily CXR  - hourly ABG  - check CVPs often, wean Nitric as able     Cardiac:  - Requiring high levels of epi, vaso, levo and dobutamine yesterday  - Been able to wean pressors down overnight  - DC dobutamine  - Wean levo and vaso off  - Epi @ 0.1, wean as able  - CVPs 21 yesterday, now down to 9-11.   - Echo 1/17:            Mildly decreased left ventricular systolic function. The estimated ejection fraction is 50%.  · Concentric left ventricular remodeling.  · Local segmental wall motion abnormalities.  · Septal wall has abnormal motion.  · Grade  I (mild) left ventricular diastolic dysfunction consistent with impaired relaxation.  · Normal right ventricular systolic function.  · Severe tricuspid regurgitation.     Renal:   - CRRT initiated last night, clot this am around 5am, restarted around 7am  - UF @ 350  - BUN/Cr ok, improving  - Kerns in place for close monitoring of UOP     Infectious Disease:   - Afebrile, WBC 18  - Antibiotics: Perioperative     Hematology/Oncology:  - post-op H/H, trending down  - Transfused pRBC x2 1/16, appropriate response  - Continue to monitor CT output     Endocrine:  - Endocrine consulted, keep blood glucose goals 140-180     Fluids/Electrolytes/Nutrition/GI:   - D5 NS 20 KCl @ 5  - Trend CMP  - LFTs trending down  - Replace Lytes PRN   - NPO  - Scopolamine patch, PRN Zofran     Prophylaxis:  - GI: protonix daily     Dispo:  Continue ICU care, continue close monitoring of ABGs/wean vent.   Primary team: CTS    Critical care was time spent personally by me on the following activities: development of treatment plan with patient or surrogate and bedside caregivers, discussions with consultants, evaluation of patient's response to treatment, examination of patient, ordering and performing treatments and interventions, ordering and review of laboratory studies, ordering and review of radiographic studies, pulse oximetry, re-evaluation of patient's condition.  This critical care time did not overlap with that of any other provider or involve time for any procedures.     Negrita Cordova MD  Critical Care - Surgery  [facility]

## 2020-01-17 NOTE — PLAN OF CARE
"Dx: Acute coronary syndrome    Shift Events: Pt intubated due to increasing O2 requirements. SPO2 >88% on 100% / 10 PEEP / 45 ppm NO. MAPs >65. SBP <160. L. IJ trialysis line placed. CRRT started. POC reviewed with pt and pt's , all questions and concerns addressed.     Neuro: Pt follows commands and moves all extremities with sedation off.     Vital Signs: BP (!) 122/59   Pulse 82   Temp 98.4 °F (36.9 °C) (Oral)   Resp 19   Ht 5' 4" (1.626 m)   Wt 101.1 kg (222 lb 14.2 oz)   LMP 09/30/2015 (Exact Date)   SpO2 (!) 92%   Breastfeeding? No   BMI 38.26 kg/m²     Diet: NPO    Gtts: Dobutamine, Propofol, Epi, Cardene    Urine Output: 5-40 mL/hr    Drains: 50 mL/hr serosang chest tube    Labs/Accuchecks: Accuchecks q4, supplemental insulin given prn. CMP/INR q6.     Skin: Midline ix dsg CDI. No other signs of breakdown noted.       "

## 2020-01-17 NOTE — SUBJECTIVE & OBJECTIVE
Interval History: Patient seen and examined at bedside, while on SLED.  UOP 265ml UOP/past 24hrs. CRRT clotted x1 overnight and restarted. Remains on vasopressors. Vented with 70% FiO2. Net positive 700ml/pst 24hrs.     Review of patient's allergies indicates:   Allergen Reactions    Contrast media      Kidney injury    Pcn [penicillins]      Rash; tolerated ceftriaxone on 1/13/20     Current Facility-Administered Medications   Medication Frequency    0.9%  NaCl infusion (CRRT USE ONLY) Continuous    acetaminophen tablet 650 mg Q4H PRN    acetaminophen tablet 650 mg Q6H PRN    amiodarone 360 mg/200 mL (1.8 mg/mL) infusion Continuous    aspirin chewable tablet 81 mg Daily    atorvastatin tablet 80 mg QHS    bisacodyl suppository 10 mg Q12H PRN    cisatracurium (NIMBEX) 200 mg in dextrose 5 % 100 mL infusion Continuous    clopidogrel tablet 75 mg Daily    dextrose 10% (D10W) Bolus PRN    dextrose 10% (D10W) Bolus PRN    dextrose 5 % and 0.9 % NaCl with KCl 20 mEq infusion Continuous    DOBUTamine 500mg in D5W 250mL infusion (premix) (NON-TITRATING) Continuous    EPINEPHrine (ADRENALIN) 5 mg in sodium chloride 0.9% 250 mL infusion Continuous    fentaNYL 2500 mcg in 0.9% sodium chloride 250 mL infusion premix (titrating) Continuous    glucagon (human recombinant) injection 1 mg PRN    heparin (porcine) injection 5,000 Units Q8H    HYDROmorphone injection 0.2 mg Q1H PRN    insulin aspart U-100 pen 0-5 Units Q4H PRN    insulin detemir U-100 pen 20 Units Daily    magnesium sulfate 2g in water 50mL IVPB (premix) PRN    metoclopramide HCl injection 10 mg Q6H PRN    mupirocin 2 % ointment 1 g BID    niCARdipine 40 mg/200 mL infusion Continuous    nitric oxide gas Gas 40 ppm Continuous    norepinephrine 4 mg in dextrose 5% 250 mL infusion (premix) (titrating) Continuous    ondansetron disintegrating tablet 8 mg Q8H PRN    oxyCODONE immediate release tablet 10 mg Q4H PRN    oxyCODONE immediate  release tablet 5 mg Q4H PRN    pantoprazole injection 40 mg Daily    polyethylene glycol packet 17 g Daily    propofol (DIPRIVAN) 10 mg/mL infusion Continuous    scopolamine 1.3-1.5 mg (1 mg over 3 days) 1 patch Q3 Days    sodium phosphate 20.01 mmol in dextrose 5 % 250 mL IVPB PRN    sodium phosphate 30 mmol in dextrose 5 % 250 mL IVPB PRN    sodium phosphate 39.99 mmol in dextrose 5 % 250 mL IVPB PRN    vasopressin (PITRESSIN) 0.2 Units/mL in dextrose 5 % 100 mL infusion Continuous       Objective:     Vital Signs (Most Recent):  Temp: 98 °F (36.7 °C) (01/17/20 1100)  Pulse: 72 (01/17/20 1307)  Resp: (!) 24 (01/17/20 1307)  BP: 127/65 (01/17/20 1307)  SpO2: 97 % (01/17/20 1307)  O2 Device (Oxygen Therapy): ventilator (01/17/20 1307) Vital Signs (24h Range):  Temp:  [98 °F (36.7 °C)-99 °F (37.2 °C)] 98 °F (36.7 °C)  Pulse:  [62-83] 72  Resp:  [13-37] 24  SpO2:  [83 %-100 %] 97 %  BP: ()/(52-67) 127/65  Arterial Line BP: ()/() 119/60     Weight: 100.7 kg (222 lb) (01/17/20 0904)  Body mass index is 38.11 kg/m².  Body surface area is 2.13 meters squared.    I/O last 3 completed shifts:  In: 4992.3 [I.V.:4201; Blood:791.3]  Out: 3260 [Urine:390; Drains:25; Other:2735; Chest Tube:110]    Physical Exam   Constitutional: She appears well-developed and well-nourished.   HENT:   Head: Normocephalic and atraumatic.   Eyes: Pupils are equal, round, and reactive to light. Conjunctivae are normal.   Neck: Neck supple.   Cardiovascular: Normal rate.   Pulmonary/Chest: She has no wheezes. She has rales.   Intubated and mechanically ventilated. Chest tubes in place    Abdominal: Soft. She exhibits no distension and no mass. There is no guarding. No hernia.   Genitourinary:   Genitourinary Comments: Kerns in place   Neurological:   Sedated   Skin: Skin is warm and dry.       Significant Labs:  CBC:   Recent Labs   Lab 01/17/20  1245 01/17/20  1308   WBC 20.29*  --    RBC 3.04*  --    HGB 8.9*  --    HCT  28.9* 25*     --    MCV 95  --    MCH 29.3  --    MCHC 30.8*  --      CMP:   Recent Labs   Lab 01/17/20  0809   *   CALCIUM 8.2*   ALBUMIN 3.4*   PROT 6.6      K 4.7   CO2 23      BUN 17   CREATININE 1.7*   ALKPHOS 117   ALT 1,115*   *   BILITOT 2.2*     All labs within the past 24 hours have been reviewed.

## 2020-01-17 NOTE — PLAN OF CARE
VSS throughout shift. PT afebrile, MAPs 70-85, Per Dr. Delarosa, new goal to keep systolic<150, Epi gtt at 0.02mcg/kg/min, Dobutamine gtt at 5mcg/kg/min, HR 70-80s NSR on monitor, backup paced at 40, 02 sats remain above 94%, currently on 50% FI02, PEEP of 7 with a rate of 24, Nitric at 40ppm per Dr. Altamirano, ABGs trended throughout shift. Pupils reactive, patient currently paralyzed, on Nimbex at 1mcg/kg/min, TO4 at power of 4 with 2/4 twitches. Sanguinous output recorded from chest tubes. Patient tolerating CRRT with a goal UF of 350, urine output 0-10ml/hr. Patient with no skin breakdown noted, weight shift assistance provided throughout shift, bath given. Patient remains free of any falls/trauma,  updated on POC, questions answered, WCTM.

## 2020-01-17 NOTE — PLAN OF CARE
"      SICU PLAN OF CARE NOTE    Dx: Acute coronary syndrome    Shift Events: Arterial line exchanged. 2 units of PRBCs given. SLED started at 2000. Levo restarted around 5 AM. Pt paralyzed due to decrease in PO2 on ABG throughout the night and increase in oxygen and ventilation requirements.    Goals of Care: Increase oxygenation     Neuro: Arouses to Voice, Follows Commands and Moves All Extremities    Vital Signs: BP (!) 104/55   Pulse 66   Temp 98.6 °F (37 °C) (Oral)   Resp 19   Ht 5' 4" (1.626 m)   Wt 101.1 kg (222 lb 14.2 oz)   LMP 09/30/2015 (Exact Date)   SpO2 (!) 91%   Breastfeeding? No   BMI 38.26 kg/m²     Respiratory: Ventilator    Diet: NPO    Gtts: Propfol, Fentanyl, Norepinephrine, Vasopressin, Epinephrine, Dobutamine and Cisatricurium    Urine Output: 0-50 cc/hr    Chest tube output 20 cc on shift      Labs/Accuchecks: CMP and INR q 6. Labs replaced and trended.    Skin: Dressings changed and patient turned q 2.        "

## 2020-01-17 NOTE — ASSESSMENT & PLAN NOTE
Neuro:   - sedated with fentanyl/propofol     Pulmonary:   - Worsening respiratory distress, intubated 1/16  - Wean vent settings  - Daily CXR  - hourly ABG  - check CVPs often, wean Nitric as able     Cardiac:  - Requiring high levels of epi, vaso, levo and dobutamine yesterday  - Been able to wean pressors down overnight  - DC dobutamine  - Wean levo and vaso off  - Epi @ 0.1, wean as able  - CVPs 21 yesterday, now down to 9-11.   - Echo 1/17:            Mildly decreased left ventricular systolic function. The estimated ejection fraction is 50%.  · Concentric left ventricular remodeling.  · Local segmental wall motion abnormalities.  · Septal wall has abnormal motion.  · Grade I (mild) left ventricular diastolic dysfunction consistent with impaired relaxation.  · Normal right ventricular systolic function.  · Severe tricuspid regurgitation.     Renal:   - CRRT initiated last night, clot this am around 5am, restarted around 7am  - UF @ 350  - BUN/Cr ok, improving  - Kerns in place for close monitoring of UOP     Infectious Disease:   - Afebrile, WBC 18  - Antibiotics: Perioperative     Hematology/Oncology:  - post-op H/H, trending down  - Transfused pRBC x2 1/16, appropriate response  - Continue to monitor CT output     Endocrine:  - Endocrine consulted, keep blood glucose goals 140-180     Fluids/Electrolytes/Nutrition/GI:   - D5 NS 20 KCl @ 5  - Trend CMP  - LFTs trending down  - Replace Lytes PRN   - NPO  - Scopolamine patch, PRN Zofran     Prophylaxis:  - GI: protonix daily     Dispo:  Continue ICU care, continue close monitoring of ABGs/wean vent.   Primary team: JI

## 2020-01-17 NOTE — PROGRESS NOTES
[facility]  Nephrology  Progress Note    Patient Name: Suyapa Connelly  MRN: [id number]  Admission Date: 1/2/2020  Hospital Length of Stay: 15 days  Attending Provider: Huang Altamirano MD   Primary Care Physician: Erlinda Rahman NP  Principal Problem:Acute coronary syndrome      Interval History: Patient seen and examined at bedside, while on SLED.  UOP 265ml UOP/past 24hrs. CRRT clotted x1 overnight and restarted. Remains on vasopressors. Vented with 70% FiO2. Net positive 700ml/pst 24hrs.     Review of patient's allergies indicates:   Allergen Reactions    Contrast media      Kidney injury    Pcn [penicillins]      Rash; tolerated ceftriaxone on 1/13/20     Current Facility-Administered Medications   Medication Frequency    0.9%  NaCl infusion (CRRT USE ONLY) Continuous    acetaminophen tablet 650 mg Q4H PRN    acetaminophen tablet 650 mg Q6H PRN    amiodarone 360 mg/200 mL (1.8 mg/mL) infusion Continuous    aspirin chewable tablet 81 mg Daily    atorvastatin tablet 80 mg QHS    bisacodyl suppository 10 mg Q12H PRN    cisatracurium (NIMBEX) 200 mg in dextrose 5 % 100 mL infusion Continuous    clopidogrel tablet 75 mg Daily    dextrose 10% (D10W) Bolus PRN    dextrose 10% (D10W) Bolus PRN    dextrose 5 % and 0.9 % NaCl with KCl 20 mEq infusion Continuous    DOBUTamine 500mg in D5W 250mL infusion (premix) (NON-TITRATING) Continuous    EPINEPHrine (ADRENALIN) 5 mg in sodium chloride 0.9% 250 mL infusion Continuous    fentaNYL 2500 mcg in 0.9% sodium chloride 250 mL infusion premix (titrating) Continuous    glucagon (human recombinant) injection 1 mg PRN    heparin (porcine) injection 5,000 Units Q8H    HYDROmorphone injection 0.2 mg Q1H PRN    insulin aspart U-100 pen 0-5 Units Q4H PRN    insulin detemir U-100 pen 20 Units Daily    magnesium sulfate 2g in water 50mL IVPB (premix) PRN    metoclopramide HCl injection 10 mg Q6H PRN    mupirocin 2 % ointment 1 g BID     niCARdipine 40 mg/200 mL infusion Continuous    nitric oxide gas Gas 40 ppm Continuous    norepinephrine 4 mg in dextrose 5% 250 mL infusion (premix) (titrating) Continuous    ondansetron disintegrating tablet 8 mg Q8H PRN    oxyCODONE immediate release tablet 10 mg Q4H PRN    oxyCODONE immediate release tablet 5 mg Q4H PRN    pantoprazole injection 40 mg Daily    polyethylene glycol packet 17 g Daily    propofol (DIPRIVAN) 10 mg/mL infusion Continuous    scopolamine 1.3-1.5 mg (1 mg over 3 days) 1 patch Q3 Days    sodium phosphate 20.01 mmol in dextrose 5 % 250 mL IVPB PRN    sodium phosphate 30 mmol in dextrose 5 % 250 mL IVPB PRN    sodium phosphate 39.99 mmol in dextrose 5 % 250 mL IVPB PRN    vasopressin (PITRESSIN) 0.2 Units/mL in dextrose 5 % 100 mL infusion Continuous       Objective:     Vital Signs (Most Recent):  Temp: 98 °F (36.7 °C) (01/17/20 1100)  Pulse: 72 (01/17/20 1307)  Resp: (!) 24 (01/17/20 1307)  BP: 127/65 (01/17/20 1307)  SpO2: 97 % (01/17/20 1307)  O2 Device (Oxygen Therapy): ventilator (01/17/20 1307) Vital Signs (24h Range):  Temp:  [98 °F (36.7 °C)-99 °F (37.2 °C)] 98 °F (36.7 °C)  Pulse:  [62-83] 72  Resp:  [13-37] 24  SpO2:  [83 %-100 %] 97 %  BP: ()/(52-67) 127/65  Arterial Line BP: ()/() 119/60     Weight: 100.7 kg (222 lb) (01/17/20 0904)  Body mass index is 38.11 kg/m².  Body surface area is 2.13 meters squared.    I/O last 3 completed shifts:  In: 4992.3 [I.V.:4201; Blood:791.3]  Out: 3260 [Urine:390; Drains:25; Other:2735; Chest Tube:110]    Physical Exam   Constitutional: She appears well-developed and well-nourished.   HENT:   Head: Normocephalic and atraumatic.   Eyes: Pupils are equal, round, and reactive to light. Conjunctivae are normal.   Neck: Neck supple.   Cardiovascular: Normal rate.   Pulmonary/Chest: She has no wheezes. She has rales.   Intubated and mechanically ventilated. Chest tubes in place    Abdominal: Soft. She exhibits no  distension and no mass. There is no guarding. No hernia.   Genitourinary:   Genitourinary Comments: Kerns in place   Neurological:   Sedated   Skin: Skin is warm and dry.       Significant Labs:  CBC:   Recent Labs   Lab 01/17/20  1245 01/17/20  1308   WBC 20.29*  --    RBC 3.04*  --    HGB 8.9*  --    HCT 28.9* 25*     --    MCV 95  --    MCH 29.3  --    MCHC 30.8*  --      CMP:   Recent Labs   Lab 01/17/20  0809   *   CALCIUM 8.2*   ALBUMIN 3.4*   PROT 6.6      K 4.7   CO2 23      BUN 17   CREATININE 1.7*   ALKPHOS 117   ALT 1,115*   *   BILITOT 2.2*     All labs within the past 24 hours have been reviewed.         Assessment/Plan:     ROSLYN (acute kidney injury)  At time of re-consult: Ms Connelly is a 53 y/o female who is s/p CABG. Nephrology re-consulted for ROSLYN, decreased UOP, and concern for volume overload. Patient is now anuric with sCr trending up (4.4 on 1/16/20). Patient with metabolic acidosis on ABG/serum CO2 of 19. Patient now with concern for volume overload and now intubated after progressively increased oxygen requirements. Concern for pulmonary edema on CXR.     Plan/recommendations:     -Continuous SLED for metabolic clearance and volume management, UFG 350cc/hr, keep map >65 while on CRRT. Bicarb bath of 30; acidosis improving.   -Please discuss with nephrology prior to adjusting UF outside of prescribed orders. Will coordinate fluid goals with primary team.   -Strict I/O's   -Renally dose medications   -Avoid nephrotoxic medications  -Labs per CRRT protocol  -Maintain Hgb >7.0  -Will continue to follow closely   -Plan discussed with Staff, Dr Tim              Thank you for your consult. I will follow-up with patient. Please contact us if you have any additional questions.    Randell Walker, NP  Nephrology  [facility]

## 2020-01-17 NOTE — ASSESSMENT & PLAN NOTE
At time of re-consult: Ms Connelly is a 55 y/o female who is s/p CABG. Nephrology re-consulted for ROSLYN, decreased UOP, and concern for volume overload. Patient is now anuric with sCr trending up (4.4 on 1/16/20). Patient with metabolic acidosis on ABG/serum CO2 of 19. Patient now with concern for volume overload and now intubated after progressively increased oxygen requirements. Concern for pulmonary edema on CXR.     Plan/recommendations:     -Continuous SLED for metabolic clearance and volume management, UFG 350cc/hr, keep map >65 while on CRRT. Bicarb bath of 30; acidosis improving.   -Please discuss with nephrology prior to adjusting UF outside of prescribed orders. Will coordinate fluid goals with primary team.   -Strict I/O's   -Renally dose medications   -Avoid nephrotoxic medications  -Labs per CRRT protocol  -Maintain Hgb >7.0  -Will continue to follow closely   -Plan discussed with Staff, Dr Tim

## 2020-01-17 NOTE — PT/OT/SLP PROGRESS
Physical Therapy      Patient Name:  Suyapa Connelly   MRN:  [id number]    Pt with worsening medical status, now on paralytics with increasing O2 requirements. Pt is not appropriate for initiation of therapy services at this time. Will discontinue PT orders. Please re-consult if pt experiences a change in medical status.     Fely Pappas, PT

## 2020-01-17 NOTE — PROGRESS NOTES
"Dr. Altamirano to bedside for rounds, updated on patients VS, recent lab results and ABGs. Dr. Altamirano  gave verbal order to start Dobutamine drip at 2.5mcg/kg/min, to titrate EPI gtt down to 0.06mcg/kg/min and also adjusted Vent settings to 50% of FI02 and PEEP of 7.5. He also stated he was "Ok with a higher MAP for higher perfusion." Gave order for MAPs no greater than 85, no less than 60.     "

## 2020-01-17 NOTE — PT/OT/SLP PROGRESS
Occupational Therapy      Patient Name:  Suyapa Connelly   MRN:  [id number]    Patient not seen today secondary to not appropriate for progressive mobility d/t medical complexity. Pt is now paralyze with increasing O2 requirements. Will sign off. Please reconsult if situation changes.   ANNY Foster  1/17/2020

## 2020-01-17 NOTE — PROGRESS NOTES
Dr. Altamirano at bedside. MD notified of pt's UO, ABG, lab results, and increasing O2 requirements. Orders received to give lasix 40 mg, wean vaso/epi, and keep HR >55.

## 2020-01-17 NOTE — PROGRESS NOTES
[facility]  Nephrology  Progress Note    Patient Name: Suyapa Connelly  MRN: [id number]  Admission Date: 1/2/2020  Hospital Length of Stay: 14 days  Attending Provider: Huang Altamirano MD   Primary Care Physician: Erlinda Rahman NP  Principal Problem:Acute coronary syndrome      Interval History: Nephrology re-consulted for concerns for ROSLYN, decreased UOP, and volume overload. Patient s/p CABG (off pump) on 1/14/20. Patient intubated and trialysis in place. Inadequate response to diuretics. On vasopressors.      Review of patient's allergies indicates:   Allergen Reactions    Contrast media      Kidney injury    Pcn [penicillins]      Rash; tolerated ceftriaxone on 1/13/20     Current Facility-Administered Medications   Medication Frequency    0.9%  NaCl infusion (CRRT USE ONLY) Continuous    acetaminophen tablet 650 mg Q4H PRN    acetaminophen tablet 650 mg Q6H PRN    amiodarone 360 mg/200 mL (1.8 mg/mL) infusion Continuous    aspirin chewable tablet 81 mg Daily    aspirin tablet 325 mg Once    atorvastatin tablet 80 mg QHS    bisacodyl suppository 10 mg Q12H PRN    chlorothiazide (DIURIL) 500 mg in dextrose 5 % 50 mL IVPB Once    clopidogrel tablet 75 mg Daily    dextrose 10% (D10W) Bolus PRN    dextrose 10% (D10W) Bolus PRN    dextrose 5 % and 0.9 % NaCl with KCl 20 mEq infusion Continuous    DOBUTamine 500mg in D5W 250mL infusion (premix) (NON-TITRATING) Continuous    EPINEPHrine (ADRENALIN) 5 mg in sodium chloride 0.9% 250 mL infusion Continuous    furosemide (LASIX) 2 mg/mL in sodium chloride 0.9% 100 mL infusion (conc: 2 mg/mL) Continuous    glucagon (human recombinant) injection 1 mg PRN    heparin (porcine) injection 5,000 Units Q8H    HYDROmorphone injection 0.2 mg Q1H PRN    insulin aspart U-100 pen 0-5 Units Q4H PRN    insulin detemir U-100 pen 20 Units Daily    magnesium sulfate 2g in water 50mL IVPB (premix) PRN    metoclopramide HCl injection 10 mg Q6H  PRN    mupirocin 2 % ointment 1 g BID    niCARdipine 40 mg/200 mL infusion Continuous    nitric oxide gas Gas 45 ppm Continuous    norepinephrine 4 mg in dextrose 5% 250 mL infusion (premix) (titrating) Continuous    ondansetron disintegrating tablet 8 mg Q8H PRN    oxyCODONE immediate release tablet 10 mg Q4H PRN    oxyCODONE immediate release tablet 5 mg Q4H PRN    pantoprazole injection 40 mg Daily    phenylephrine HCl in 0.9% NaCl 1 mg/10 mL (100 mcg/mL) syringe     polyethylene glycol packet 17 g Daily    propofol (DIPRIVAN) 10 mg/mL infusion     propofol (DIPRIVAN) 10 mg/mL infusion Continuous    scopolamine 1.3-1.5 mg (1 mg over 3 days) 1 patch Q3 Days    sodium phosphate 20.01 mmol in dextrose 5 % 250 mL IVPB PRN    sodium phosphate 30 mmol in dextrose 5 % 250 mL IVPB PRN    sodium phosphate 39.99 mmol in dextrose 5 % 250 mL IVPB PRN    succinylcholine (ANECTINE) 20 mg/mL injection     vasopressin (PITRESSIN) 0.2 Units/mL in dextrose 5 % 100 mL infusion Continuous       Objective:     Vital Signs (Most Recent):  Temp: 99 °F (37.2 °C) (01/16/20 1500)  Pulse: 78 (01/16/20 1832)  Resp: 20 (01/16/20 1832)  BP: 139/65 (01/16/20 1800)  SpO2: (!) 92 % (01/16/20 1832)  O2 Device (Oxygen Therapy): ventilator (01/16/20 1800) Vital Signs (24h Range):  Temp:  [97.9 °F (36.6 °C)-99 °F (37.2 °C)] 99 °F (37.2 °C)  Pulse:  [57-80] 78  Resp:  [10-37] 20  SpO2:  [84 %-100 %] 92 %  BP: (100-144)/(56-77) 139/65  Arterial Line BP: ()/() 231/217     Weight: 101.1 kg (222 lb 14.2 oz) (01/15/20 0554)  Body mass index is 38.26 kg/m².  Body surface area is 2.14 meters squared.    I/O last 3 completed shifts:  In: 5260.7 [I.V.:3785.7; Blood:1250; IV Piggyback:225]  Out: 665 [Urine:395; Chest Tube:270]    Physical Exam   Constitutional: She appears well-developed and well-nourished.   HENT:   Head: Normocephalic and atraumatic.   Eyes: Pupils are equal, round, and reactive to light. Conjunctivae are  normal.   Neck: Neck supple.   Cardiovascular: Normal rate.   Pulmonary/Chest: She has no wheezes. She has rales.   Intubated and mechanically ventilated. Chest tubes in place    Abdominal: Soft. She exhibits no distension and no mass. There is no guarding. No hernia.   Genitourinary:   Genitourinary Comments: Kerns in place   Neurological:   Sedated   Skin: Skin is warm and dry.       Significant Labs:  CBC:   Recent Labs   Lab 01/16/20  1613  01/16/20  1800   WBC 18.53*  --   --    RBC 2.41*  --   --    HGB 7.7*  --   --    HCT 23.8*   < > 23*     --   --    MCV 99*  --   --    MCH 32.0*  --   --    MCHC 32.4  --   --     < > = values in this interval not displayed.     CMP:   Recent Labs   Lab 01/16/20  1613   *   CALCIUM 8.4*   ALBUMIN 3.7   PROT 6.6      K 5.2*   CO2 19*      BUN 62*   CREATININE 4.4*   ALKPHOS 94   ALT 1,329*   AST 1,990*   BILITOT 1.2*     All labs within the past 24 hours have been reviewed.         Assessment/Plan:     ROSLYN (acute kidney injury)  Ms Connelly is a 53 y/o female who is s/p CABG. Nephrology re-consulted for ROSLYN, decreased UOP, and concern for volume overload. Patient is now anuric with sCr trending up (4.4 on 1/16/20). Patient with metabolic acidosis on ABG/serum CO2 of 19. Patient now with concern for volume overload and now intubated after progressively increased oxygen requirements. Concern for pulmonary edema on CXR.     Plan/recommendations:     -Continuous SLED for metabolic clearance and volume management, UFG of 200-300cc/hr  -Strict I/O's   -Renally dose medications   -Avoid nephrotoxic medications  -Labs per CRRT protocol  -Maintain Hgb >7.0  -Will continue to follow closely   -Plan discussed with Staff, Dr Tim              Thank you for your consult. I will follow-up with patient. Please contact us if you have any additional questions.    Randell Walker NP  Nephrology  [facility]

## 2020-01-17 NOTE — SUBJECTIVE & OBJECTIVE
Interval History/Significant Events: Patient with persistent poor oxygenation and desaturations yesterday afternoon. Also started requiring multiple vasopressors. Renal function also declined significantly. Pt was intubated and CRRT begun. High concern for cardiac dysfunction vs early ARDS.     Follow-up For: Procedure(s) (LRB):  CORONARY ARTERY BYPASS GRAFT (CABG) x 1     Off Pump (N/A)    Post-Operative Day: 3 Days Post-Op    Objective:     Vital Signs (Most Recent):  Temp: 98.3 °F (36.8 °C) (01/17/20 0700)  Pulse: 70 (01/17/20 1000)  Resp: 20 (01/17/20 1000)  BP: (!) 111/58 (01/17/20 1000)  SpO2: 99 % (01/17/20 1000) Vital Signs (24h Range):  Temp:  [97.9 °F (36.6 °C)-99 °F (37.2 °C)] 98.3 °F (36.8 °C)  Pulse:  [59-83] 70  Resp:  [10-37] 20  SpO2:  [83 %-100 %] 99 %  BP: ()/(52-69) 111/58  Arterial Line BP: ()/() 96/48     Weight: 100.7 kg (222 lb)  Body mass index is 38.11 kg/m².      Intake/Output Summary (Last 24 hours) at 1/17/2020 1022  Last data filed at 1/17/2020 1000  Gross per 24 hour   Intake 3806.25 ml   Output 4149 ml   Net -342.75 ml       Physical Exam   Constitutional: She appears well-developed and well-nourished. She is sedated and intubated.   HENT:   Head: Normocephalic and atraumatic.   Eyes: Pupils are equal, round, and reactive to light.   Neck: Normal range of motion. Neck supple.   Bilateral CVC, dressings CDI   Cardiovascular: Normal rate and regular rhythm.   Pulmonary/Chest: She is intubated.   Mechanically ventilated   Abdominal: Soft. She exhibits no distension. There is no rebound.   Musculoskeletal: Normal range of motion. She exhibits edema. She exhibits no deformity.   Neurological:   sedated   Skin: Skin is warm and dry. No rash noted. No erythema.       Vents:  Vent Mode: A/C (01/17/20 0911)  Ventilator Initiated: Yes (01/16/20 1448)  Set Rate: 20 bmp (01/17/20 0911)  Vt Set: 325 mL (01/17/20 0911)  PEEP/CPAP: 12 cmH20 (01/17/20 0911)  Oxygen Concentration (%):  60 (01/17/20 1000)  Peak Airway Pressure: 27 cmH2O (01/17/20 0911)  Plateau Pressure: 0 cmH20 (01/17/20 0911)  Total Ve: 7.11 mL (01/17/20 0911)  F/VT Ratio<105 (RSBI): (!) 56.82 (01/17/20 0911)    Lines/Drains/Airways     Central Venous Catheter Line                 Percutaneous Central Line Insertion/Assessment - double lumen  01/14/20 0725 3 days         Percutaneous Central Line Insertion/Assessment - triple lumen  01/16/20 1515 left internal jugular less than 1 day          Drain                 Chest Tube 01/14/20 1017 1 Left Pleural 19 Fr. 3 days         Urethral Catheter 01/14/20 0720 Non-latex;Temperature probe;Straight-tip 14 Fr. 3 days         Y Chest Tube 1 and 2 01/14/20 1018 1 Anterior Mediastinal 19 Fr. 2 Anterior Mediastinal 19 Fr. 3 days         NG/OG Tube 01/16/20 1446 orogastric less than 1 day          Airway                 Airway - Non-Surgical 01/16/20 1442 Endotracheal Tube less than 1 day          Arterial Line                 Arterial Line 01/14/20 0701 Left Radial 3 days          Line                 Pacer Wires 01/14/20 1009 3 days                Significant Labs:    CBC/Anemia Profile:  Recent Labs   Lab 01/16/20  1613  01/17/20  0253  01/17/20  0808 01/17/20  0809 01/17/20  0910   WBC 18.53*  --  17.87*  --   --  19.90*  --    HGB 7.7*  --  9.2*  --   --  9.1*  --    HCT 23.8*   < > 29.0*   < > 27* 28.7* 22*     --  165  --   --  145*  --    MCV 99*  --  94  --   --  95  --    RDW 14.1  --  16.8*  --   --  17.1*  --     < > = values in this interval not displayed.        Chemistries:  Recent Labs   Lab 01/16/20 2127 01/17/20  0253 01/17/20  0809     137 139  139  139 139   K 4.9  4.9 4.6  4.6  4.6 4.7     104 104  104  104 105   CO2 20*  20* 21*  21*  21* 23   BUN 40*  40* 22*  22*  22* 17   CREATININE 2.9*  2.9* 1.9*  1.9*  1.9* 1.7*   CALCIUM 8.3*  8.3* 8.9  8.9  8.9 8.2*   ALBUMIN 3.7  3.7 3.6  3.6 3.4*   PROT 6.7 6.8 6.6   BILITOT 1.3* 2.2*  2.2*   ALKPHOS 104 109 117   ALT 1,304* 1,225* 1,115*   AST 1,576* 1,212* 877*   MG 1.9 1.9 2.2   PHOS 5.4* 3.3 3.5       ABGs:   Recent Labs   Lab 01/17/20  1022   PH 7.312*   PCO2 47.7*   HCO3 24.2   POCSATURATED 97   BE -2     Coagulation:   Recent Labs   Lab 01/17/20  0810   INR 1.2   APTT 24.8     Lactic Acid: No results for input(s): LACTATE in the last 48 hours.    Significant Imaging:  I have reviewed all pertinent imaging results/findings within the past 24 hours.

## 2020-01-17 NOTE — CARE UPDATE
"BG goal 140 - 180   BG is now within goal range.     Continue Levemir 20 units daily. (0.4 u/kg weight based dosing)  Low Dose SQ Insulin Correction Scale every 4 hours while NPO.   BG monitoring every 4 hours while NPO.      ** Please call Endocrine for any BG related issues **  ** Please notify Endocrine for any change and/or advance in diet**        Discharge Planning:   TBD. Please notify endocrinology prior to discharge. Patient will need adjustments to home insulin regimen.      Tentative:     Levemir 20 units HS.     Novolog 7 units TIDWM.     Low Dose SQ Insulin Correction Scale.    BG Monitoring AC/HS     PRN Insurance preferred diabetes testing supplies    PRN Ultra-Fine Junie Pen Needles 4mm x 32 G (5/32" x 0.23mm).     Patient request outpatient follow-up with [facility] [place] endo clinic for continued DM management.  "

## 2020-01-17 NOTE — PROGRESS NOTES
"[facility]  Adult Nutrition  Progress Note    SUMMARY       Recommendations  1. As medically able, recommend initiating TF of Impact Peptide 1.5 at a goal rate of 45 mL/hr - to provide 1620 kcal/day, 102g protein/day, and 832mL free fluid/day.   2. When able to extubate, ADAT to Cardiac, Diabetic with texture per SLP.   RD to monitor.    Goals: Patient to receive nutrition by RD follow-up  Nutrition Goal Status: goal not met  Communication of RD Recs: discussed on rounds    Reason for Assessment  Reason For Assessment: RD follow-up  Diagnosis: surgery/postoperative complications(s/p CABG 1/14)  Relevant Medical History: CAD, CHF, DM2, HLD, HTN, GERD, PAD  Interdisciplinary Rounds: attended  General Information Comments: Required intubation yesterday. Currently intubated, sedated, paralyzed. Patient continues to appear nourished with no physical indicators of malnutrition.  Nutrition Discharge Planning: Unable to determine at this time.    Nutrition Risk Screen  Nutrition Risk Screen: no indicators present    Nutrition/Diet History  Spiritual, Cultural Beliefs, Jewish Practices, Values that Affect Care: no  Factors Affecting Nutritional Intake: NPO, on mechanical ventilation    Anthropometrics  Temp: 98 °F (36.7 °C)  Height Method: Stated  Height: 5' 4" (162.6 cm)  Height (inches): 64 in  Weight Method: Bed Scale  Weight: 100.7 kg (222 lb)  Weight (lb): 222 lb  Ideal Body Weight (IBW), Female: 120 lb  % Ideal Body Weight, Female (lb): 185 %  BMI (Calculated): 38.1  BMI Grade: 35 - 39.9 - obesity - grade II    Lab/Procedures/Meds  Pertinent Labs Reviewed: reviewed  Pertinent Labs Comments: BUN 22, Creat 1.9, Glu 148, T. bili 2.2  Pertinent Medications Reviewed: reviewed  Pertinent Medications Comments: lasix, pantoprazole, amiodarone, nimbex, dobutamine, epinephrine, fentnayl, cardene, levophed, propofol, vasopressin    Estimated/Assessed Needs  Weight Used For Calorie Calculations: 100.7 kg " (222 lb 0.1 oz)  Energy Calorie Requirements (kcal): 1804 kcal/day  Energy Need Method: [place] (modified)  Protein Requirements:  g/day(1.5-2.0 g/kg)  Weight Used For Protein Calculations: 54.5 kg (120 lb 2.4 oz)(IBW)  Fluid Requirements (mL): 1 mL/kcal or per MD  Estimated Fluid Requirement Method: RDA Method  RDA Method (mL): 1804    Nutrition Prescription Ordered  Current Diet Order: NPO    Evaluation of Received Nutrient/Fluid Intake  I/O: noted  Comments: LBM 1/12  % Intake of Estimated Energy Needs: 0 - 25 %  % Meal Intake: NPO    Nutrition Risk  Level of Risk/Frequency of Follow-up: high(2x/week)     Assessment and Plan  Nutrition Problem  Increased nutrient needs     Related to (etiology):   Physiological causes related to healing     Signs and Symptoms (as evidenced by):   S/p CABG 1/14      Interventions (treatment strategy):  Collaboration of nutrition care with other providers     Nutrition Diagnosis Status:   Continues      Nutrition Problem  Inadequate energy intake    Related to (etiology):   Decreased ability to consume sufficient energy    Signs and Symptoms (as evidenced by):   NPO with no alternative means of nutrition at this time    Interventions (treatment strategy):  Collaboration of nutrition care with other providers    Nutrition Diagnosis Status:   New    Monitor and Evaluation  Food and Nutrient Intake: energy intake, food and beverage intake  Food and Nutrient Adminstration: diet order  Knowledge/Beliefs/Attitudes: food and nutrition knowledge/skill  Physical Activity and Function: nutrition-related ADLs and IADLs  Anthropometric Measurements: weight, weight change  Biochemical Data, Medical Tests and Procedures: electrolyte and renal panel, gastrointestinal profile, glucose/endocrine profile, inflammatory profile  Nutrition-Focused Physical Findings: overall appearance     Nutrition Follow-Up  RD Follow-up?: Yes

## 2020-01-18 LAB
ALBUMIN SERPL BCP-MCNC: 2.7 G/DL (ref 3.5–5.2)
ALBUMIN SERPL BCP-MCNC: 2.8 G/DL (ref 3.5–5.2)
ALBUMIN SERPL BCP-MCNC: 2.9 G/DL (ref 3.5–5.2)
ALLENS TEST: ABNORMAL
ALLENS TEST: NORMAL
ALP SERPL-CCNC: 124 U/L (ref 55–135)
ALP SERPL-CCNC: 124 U/L (ref 55–135)
ALP SERPL-CCNC: 130 U/L (ref 55–135)
ALP SERPL-CCNC: 179 U/L (ref 55–135)
ALT SERPL W/O P-5'-P-CCNC: 685 U/L (ref 10–44)
ALT SERPL W/O P-5'-P-CCNC: 759 U/L (ref 10–44)
ALT SERPL W/O P-5'-P-CCNC: 785 U/L (ref 10–44)
ALT SERPL W/O P-5'-P-CCNC: 849 U/L (ref 10–44)
ANION GAP SERPL CALC-SCNC: 10 MMOL/L (ref 8–16)
ANION GAP SERPL CALC-SCNC: 6 MMOL/L (ref 8–16)
ANION GAP SERPL CALC-SCNC: 8 MMOL/L (ref 8–16)
ANION GAP SERPL CALC-SCNC: 9 MMOL/L (ref 8–16)
AST SERPL-CCNC: 265 U/L (ref 10–40)
AST SERPL-CCNC: 317 U/L (ref 10–40)
AST SERPL-CCNC: 336 U/L (ref 10–40)
AST SERPL-CCNC: 393 U/L (ref 10–40)
BASOPHILS # BLD AUTO: 0.02 K/UL (ref 0–0.2)
BASOPHILS # BLD AUTO: 0.03 K/UL (ref 0–0.2)
BASOPHILS # BLD AUTO: 0.03 K/UL (ref 0–0.2)
BASOPHILS NFR BLD: 0.2 % (ref 0–1.9)
BILIRUB SERPL-MCNC: 2.5 MG/DL (ref 0.1–1)
BILIRUB SERPL-MCNC: 2.7 MG/DL (ref 0.1–1)
BILIRUB SERPL-MCNC: 2.9 MG/DL (ref 0.1–1)
BILIRUB SERPL-MCNC: 2.9 MG/DL (ref 0.1–1)
BUN SERPL-MCNC: 3 MG/DL (ref 6–20)
BUN SERPL-MCNC: 4 MG/DL (ref 6–20)
CALCIUM SERPL-MCNC: 8.1 MG/DL (ref 8.7–10.5)
CALCIUM SERPL-MCNC: 8.2 MG/DL (ref 8.7–10.5)
CALCIUM SERPL-MCNC: 8.3 MG/DL (ref 8.7–10.5)
CALCIUM SERPL-MCNC: 8.5 MG/DL (ref 8.7–10.5)
CHLORIDE SERPL-SCNC: 104 MMOL/L (ref 95–110)
CHLORIDE SERPL-SCNC: 105 MMOL/L (ref 95–110)
CHLORIDE SERPL-SCNC: 105 MMOL/L (ref 95–110)
CHLORIDE SERPL-SCNC: 106 MMOL/L (ref 95–110)
CO2 SERPL-SCNC: 24 MMOL/L (ref 23–29)
CO2 SERPL-SCNC: 25 MMOL/L (ref 23–29)
CO2 SERPL-SCNC: 26 MMOL/L (ref 23–29)
CO2 SERPL-SCNC: 27 MMOL/L (ref 23–29)
CREAT SERPL-MCNC: 0.7 MG/DL (ref 0.5–1.4)
DELSYS: ABNORMAL
DELSYS: NORMAL
DELSYS: NORMAL
DIFFERENTIAL METHOD: ABNORMAL
EOSINOPHIL # BLD AUTO: 0.1 K/UL (ref 0–0.5)
EOSINOPHIL # BLD AUTO: 0.2 K/UL (ref 0–0.5)
EOSINOPHIL # BLD AUTO: 0.2 K/UL (ref 0–0.5)
EOSINOPHIL NFR BLD: 0.6 % (ref 0–8)
EOSINOPHIL NFR BLD: 1.4 % (ref 0–8)
EOSINOPHIL NFR BLD: 1.5 % (ref 0–8)
ERYTHROCYTE [DISTWIDTH] IN BLOOD BY AUTOMATED COUNT: 17.2 % (ref 11.5–14.5)
ERYTHROCYTE [DISTWIDTH] IN BLOOD BY AUTOMATED COUNT: 17.3 % (ref 11.5–14.5)
ERYTHROCYTE [DISTWIDTH] IN BLOOD BY AUTOMATED COUNT: 17.4 % (ref 11.5–14.5)
ERYTHROCYTE [SEDIMENTATION RATE] IN BLOOD BY WESTERGREN METHOD: 24 MM/H
EST. GFR  (AFRICAN AMERICAN): >60 ML/MIN/1.73 M^2
EST. GFR  (NON AFRICAN AMERICAN): >60 ML/MIN/1.73 M^2
FIO2: 50
GLUCOSE SERPL-MCNC: 107 MG/DL (ref 70–110)
GLUCOSE SERPL-MCNC: 118 MG/DL (ref 70–110)
GLUCOSE SERPL-MCNC: 91 MG/DL (ref 70–110)
GLUCOSE SERPL-MCNC: 93 MG/DL (ref 70–110)
GLUCOSE SERPL-MCNC: 96 MG/DL (ref 70–110)
HCO3 UR-SCNC: 24.3 MMOL/L (ref 24–28)
HCO3 UR-SCNC: 25.5 MMOL/L (ref 24–28)
HCO3 UR-SCNC: 25.7 MMOL/L (ref 24–28)
HCO3 UR-SCNC: 26 MMOL/L (ref 24–28)
HCT VFR BLD AUTO: 26.1 % (ref 37–48.5)
HCT VFR BLD AUTO: 26.5 % (ref 37–48.5)
HCT VFR BLD AUTO: 26.6 % (ref 37–48.5)
HCT VFR BLD CALC: 22 %PCV (ref 36–54)
HCT VFR BLD CALC: 24 %PCV (ref 36–54)
HCT VFR BLD CALC: 24 %PCV (ref 36–54)
HGB BLD-MCNC: 8.1 G/DL (ref 12–16)
HGB BLD-MCNC: 8.3 G/DL (ref 12–16)
HGB BLD-MCNC: 8.3 G/DL (ref 12–16)
IMM GRANULOCYTES # BLD AUTO: 0.07 K/UL (ref 0–0.04)
IMM GRANULOCYTES # BLD AUTO: 0.09 K/UL (ref 0–0.04)
IMM GRANULOCYTES # BLD AUTO: 0.1 K/UL (ref 0–0.04)
IMM GRANULOCYTES NFR BLD AUTO: 0.6 % (ref 0–0.5)
IMM GRANULOCYTES NFR BLD AUTO: 0.7 % (ref 0–0.5)
IMM GRANULOCYTES NFR BLD AUTO: 0.8 % (ref 0–0.5)
INR PPP: 1 (ref 0.8–1.2)
INR PPP: 1.1 (ref 0.8–1.2)
LDH SERPL L TO P-CCNC: 0.54 MMOL/L (ref 0.36–1.25)
LDH SERPL L TO P-CCNC: 0.58 MMOL/L (ref 0.36–1.25)
LDH SERPL L TO P-CCNC: 0.64 MMOL/L (ref 0.36–1.25)
LDH SERPL L TO P-CCNC: 0.65 MMOL/L (ref 0.36–1.25)
LYMPHOCYTES # BLD AUTO: 0.8 K/UL (ref 1–4.8)
LYMPHOCYTES # BLD AUTO: 1 K/UL (ref 1–4.8)
LYMPHOCYTES # BLD AUTO: 1.1 K/UL (ref 1–4.8)
LYMPHOCYTES NFR BLD: 6.9 % (ref 18–48)
LYMPHOCYTES NFR BLD: 7.6 % (ref 18–48)
LYMPHOCYTES NFR BLD: 8.5 % (ref 18–48)
MAGNESIUM SERPL-MCNC: 1.9 MG/DL (ref 1.6–2.6)
MAGNESIUM SERPL-MCNC: 1.9 MG/DL (ref 1.6–2.6)
MAGNESIUM SERPL-MCNC: 2.1 MG/DL (ref 1.6–2.6)
MAGNESIUM SERPL-MCNC: 2.1 MG/DL (ref 1.6–2.6)
MCH RBC QN AUTO: 29.2 PG (ref 27–31)
MCH RBC QN AUTO: 29.7 PG (ref 27–31)
MCH RBC QN AUTO: 29.9 PG (ref 27–31)
MCHC RBC AUTO-ENTMCNC: 31 G/DL (ref 32–36)
MCHC RBC AUTO-ENTMCNC: 31.2 G/DL (ref 32–36)
MCHC RBC AUTO-ENTMCNC: 31.3 G/DL (ref 32–36)
MCV RBC AUTO: 94 FL (ref 82–98)
MCV RBC AUTO: 95 FL (ref 82–98)
MCV RBC AUTO: 96 FL (ref 82–98)
METHEMOGLOBIN: 1.2 % (ref 0–3)
MODE: ABNORMAL
MODE: NORMAL
MODE: NORMAL
MONOCYTES # BLD AUTO: 0.9 K/UL (ref 0.3–1)
MONOCYTES # BLD AUTO: 1 K/UL (ref 0.3–1)
MONOCYTES # BLD AUTO: 1.1 K/UL (ref 0.3–1)
MONOCYTES NFR BLD: 7.5 % (ref 4–15)
MONOCYTES NFR BLD: 7.6 % (ref 4–15)
MONOCYTES NFR BLD: 8.6 % (ref 4–15)
NEUTROPHILS # BLD AUTO: 10.1 K/UL (ref 1.8–7.7)
NEUTROPHILS # BLD AUTO: 10.1 K/UL (ref 1.8–7.7)
NEUTROPHILS # BLD AUTO: 10.8 K/UL (ref 1.8–7.7)
NEUTROPHILS NFR BLD: 81.6 % (ref 38–73)
NEUTROPHILS NFR BLD: 82.1 % (ref 38–73)
NEUTROPHILS NFR BLD: 83.4 % (ref 38–73)
NRBC BLD-RTO: 0 /100 WBC
PCO2 BLDA: 38.6 MMHG (ref 35–45)
PCO2 BLDA: 39 MMHG (ref 35–45)
PCO2 BLDA: 39.2 MMHG (ref 35–45)
PCO2 BLDA: 40.3 MMHG (ref 35–45)
PEEP: 7
PH SMN: 7.4 [PH] (ref 7.35–7.45)
PH SMN: 7.41 [PH] (ref 7.35–7.45)
PH SMN: 7.42 [PH] (ref 7.35–7.45)
PH SMN: 7.43 [PH] (ref 7.35–7.45)
PHOSPHATE SERPL-MCNC: 2 MG/DL (ref 2.7–4.5)
PHOSPHATE SERPL-MCNC: 3.3 MG/DL (ref 2.7–4.5)
PHOSPHATE SERPL-MCNC: 4.3 MG/DL (ref 2.7–4.5)
PHOSPHATE SERPL-MCNC: 4.3 MG/DL (ref 2.7–4.5)
PLATELET # BLD AUTO: 141 K/UL (ref 150–350)
PLATELET # BLD AUTO: 143 K/UL (ref 150–350)
PLATELET # BLD AUTO: 144 K/UL (ref 150–350)
PMV BLD AUTO: 11.1 FL (ref 9.2–12.9)
PMV BLD AUTO: 11.4 FL (ref 9.2–12.9)
PMV BLD AUTO: 11.6 FL (ref 9.2–12.9)
PO2 BLDA: 83 MMHG (ref 80–100)
PO2 BLDA: 86 MMHG (ref 80–100)
PO2 BLDA: 87 MMHG (ref 80–100)
PO2 BLDA: 90 MMHG (ref 80–100)
POC BE: 0 MMOL/L
POC BE: 1 MMOL/L
POC BE: 1 MMOL/L
POC BE: 2 MMOL/L
POC IONIZED CALCIUM: 1.1 MMOL/L (ref 1.06–1.42)
POC SATURATED O2: 96 % (ref 95–100)
POC SATURATED O2: 97 % (ref 95–100)
POC TCO2: 26 MMOL/L (ref 23–27)
POC TCO2: 27 MMOL/L (ref 23–27)
POCT GLUCOSE: 113 MG/DL (ref 70–110)
POCT GLUCOSE: 127 MG/DL (ref 70–110)
POCT GLUCOSE: 136 MG/DL (ref 70–110)
POCT GLUCOSE: 91 MG/DL (ref 70–110)
POCT GLUCOSE: 93 MG/DL (ref 70–110)
POCT GLUCOSE: 93 MG/DL (ref 70–110)
POCT GLUCOSE: 97 MG/DL (ref 70–110)
POTASSIUM BLD-SCNC: 4.1 MMOL/L (ref 3.5–5.1)
POTASSIUM BLD-SCNC: 4.1 MMOL/L (ref 3.5–5.1)
POTASSIUM BLD-SCNC: 4.2 MMOL/L (ref 3.5–5.1)
POTASSIUM SERPL-SCNC: 4.3 MMOL/L (ref 3.5–5.1)
POTASSIUM SERPL-SCNC: 4.5 MMOL/L (ref 3.5–5.1)
POTASSIUM SERPL-SCNC: 4.6 MMOL/L (ref 3.5–5.1)
PROT SERPL-MCNC: 6.2 G/DL (ref 6–8.4)
PROT SERPL-MCNC: 6.4 G/DL (ref 6–8.4)
PROTHROMBIN TIME: 10.7 SEC (ref 9–12.5)
PROTHROMBIN TIME: 10.8 SEC (ref 9–12.5)
PROTHROMBIN TIME: 11.2 SEC (ref 9–12.5)
PROTHROMBIN TIME: 11.3 SEC (ref 9–12.5)
RBC # BLD AUTO: 2.73 M/UL (ref 4–5.4)
RBC # BLD AUTO: 2.78 M/UL (ref 4–5.4)
RBC # BLD AUTO: 2.84 M/UL (ref 4–5.4)
SAMPLE: ABNORMAL
SAMPLE: NORMAL
SITE: ABNORMAL
SITE: NORMAL
SODIUM BLD-SCNC: 138 MMOL/L (ref 136–145)
SODIUM BLD-SCNC: 138 MMOL/L (ref 136–145)
SODIUM BLD-SCNC: 139 MMOL/L (ref 136–145)
SODIUM SERPL-SCNC: 138 MMOL/L (ref 136–145)
SODIUM SERPL-SCNC: 139 MMOL/L (ref 136–145)
SODIUM SERPL-SCNC: 140 MMOL/L (ref 136–145)
SODIUM SERPL-SCNC: 141 MMOL/L (ref 136–145)
VT: 325
WBC # BLD AUTO: 12.24 K/UL (ref 3.9–12.7)
WBC # BLD AUTO: 12.37 K/UL (ref 3.9–12.7)
WBC # BLD AUTO: 13 K/UL (ref 3.9–12.7)

## 2020-01-18 PROCEDURE — 25000003 PHARM REV CODE 250: Performed by: SURGERY

## 2020-01-18 PROCEDURE — 99232 SBSQ HOSP IP/OBS MODERATE 35: CPT | Mod: ,,, | Performed by: NURSE PRACTITIONER

## 2020-01-18 PROCEDURE — 99900035 HC TECH TIME PER 15 MIN (STAT)

## 2020-01-18 PROCEDURE — 94761 N-INVAS EAR/PLS OXIMETRY MLT: CPT

## 2020-01-18 PROCEDURE — C9113 INJ PANTOPRAZOLE SODIUM, VIA: HCPCS | Performed by: SURGERY

## 2020-01-18 PROCEDURE — 20000000 HC ICU ROOM

## 2020-01-18 PROCEDURE — 37799 UNLISTED PX VASCULAR SURGERY: CPT

## 2020-01-18 PROCEDURE — 83735 ASSAY OF MAGNESIUM: CPT | Mod: 91

## 2020-01-18 PROCEDURE — 80100008 HC CRRT DAILY MAINTENANCE

## 2020-01-18 PROCEDURE — 63600367 HC NITRIC OXIDE PER HOUR

## 2020-01-18 PROCEDURE — 27000221 HC OXYGEN, UP TO 24 HOURS

## 2020-01-18 PROCEDURE — 94003 VENT MGMT INPAT SUBQ DAY: CPT

## 2020-01-18 PROCEDURE — 90945 DIALYSIS ONE EVALUATION: CPT

## 2020-01-18 PROCEDURE — 90945 PR DIALYSIS, NOT HEMO, 1 EVAL: ICD-10-PCS | Mod: ,,, | Performed by: INTERNAL MEDICINE

## 2020-01-18 PROCEDURE — 84295 ASSAY OF SERUM SODIUM: CPT

## 2020-01-18 PROCEDURE — 25000003 PHARM REV CODE 250: Performed by: STUDENT IN AN ORGANIZED HEALTH CARE EDUCATION/TRAINING PROGRAM

## 2020-01-18 PROCEDURE — 83605 ASSAY OF LACTIC ACID: CPT

## 2020-01-18 PROCEDURE — 85014 HEMATOCRIT: CPT

## 2020-01-18 PROCEDURE — 63600175 PHARM REV CODE 636 W HCPCS: Performed by: SURGERY

## 2020-01-18 PROCEDURE — 36415 COLL VENOUS BLD VENIPUNCTURE: CPT

## 2020-01-18 PROCEDURE — 99232 PR SUBSEQUENT HOSPITAL CARE,LEVL II: ICD-10-PCS | Mod: ,,, | Performed by: NURSE PRACTITIONER

## 2020-01-18 PROCEDURE — 90945 DIALYSIS ONE EVALUATION: CPT | Mod: ,,, | Performed by: INTERNAL MEDICINE

## 2020-01-18 PROCEDURE — 63600175 PHARM REV CODE 636 W HCPCS: Performed by: STUDENT IN AN ORGANIZED HEALTH CARE EDUCATION/TRAINING PROGRAM

## 2020-01-18 PROCEDURE — 99233 PR SUBSEQUENT HOSPITAL CARE,LEVL III: ICD-10-PCS | Mod: ,,, | Performed by: SURGERY

## 2020-01-18 PROCEDURE — 85610 PROTHROMBIN TIME: CPT | Mod: 91

## 2020-01-18 PROCEDURE — 99900026 HC AIRWAY MAINTENANCE (STAT)

## 2020-01-18 PROCEDURE — 80069 RENAL FUNCTION PANEL: CPT

## 2020-01-18 PROCEDURE — 82330 ASSAY OF CALCIUM: CPT

## 2020-01-18 PROCEDURE — 85025 COMPLETE CBC W/AUTO DIFF WBC: CPT | Mod: 91

## 2020-01-18 PROCEDURE — 80053 COMPREHEN METABOLIC PANEL: CPT | Mod: 91

## 2020-01-18 PROCEDURE — 82803 BLOOD GASES ANY COMBINATION: CPT

## 2020-01-18 PROCEDURE — 99233 SBSQ HOSP IP/OBS HIGH 50: CPT | Mod: ,,, | Performed by: SURGERY

## 2020-01-18 PROCEDURE — 63600175 PHARM REV CODE 636 W HCPCS: Performed by: NURSE PRACTITIONER

## 2020-01-18 PROCEDURE — 84132 ASSAY OF SERUM POTASSIUM: CPT

## 2020-01-18 PROCEDURE — 27200966 HC CLOSED SUCTION SYSTEM

## 2020-01-18 RX ORDER — PROPOFOL 10 MG/ML
5 INJECTION, EMULSION INTRAVENOUS CONTINUOUS
Status: DISCONTINUED | OUTPATIENT
Start: 2020-01-18 | End: 2020-01-21

## 2020-01-18 RX ORDER — HYDROCODONE BITARTRATE AND ACETAMINOPHEN 500; 5 MG/1; MG/1
TABLET ORAL CONTINUOUS
Status: DISCONTINUED | OUTPATIENT
Start: 2020-01-18 | End: 2020-01-19

## 2020-01-18 RX ORDER — CARBOXYMETHYLCELLULOSE SODIUM 10 MG/ML
1 GEL OPHTHALMIC EVERY 8 HOURS
Status: DISCONTINUED | OUTPATIENT
Start: 2020-01-18 | End: 2020-01-19

## 2020-01-18 RX ORDER — MAGNESIUM SULFATE HEPTAHYDRATE 40 MG/ML
2 INJECTION, SOLUTION INTRAVENOUS
Status: DISCONTINUED | OUTPATIENT
Start: 2020-01-18 | End: 2020-01-19

## 2020-01-18 RX ADMIN — PROPOFOL 40 MCG/KG/MIN: 10 INJECTION, EMULSION INTRAVENOUS at 10:01

## 2020-01-18 RX ADMIN — SCOPALAMINE 1 PATCH: 1 PATCH, EXTENDED RELEASE TRANSDERMAL at 10:01

## 2020-01-18 RX ADMIN — ASPIRIN 81 MG CHEWABLE TABLET 81 MG: 81 TABLET CHEWABLE at 08:01

## 2020-01-18 RX ADMIN — CARBOXYMETHYLCELLULOSE SODIUM 1 DROP: 10 GEL OPHTHALMIC at 09:01

## 2020-01-18 RX ADMIN — CISATRACURIUM BESYLATE 2 MCG/KG/MIN: 10 INJECTION INTRAVENOUS at 02:01

## 2020-01-18 RX ADMIN — CLOPIDOGREL BISULFATE 75 MG: 75 TABLET, FILM COATED ORAL at 08:01

## 2020-01-18 RX ADMIN — SODIUM PHOSPHATE, MONOBASIC, MONOHYDRATE 39.99 MMOL: 276; 142 INJECTION, SOLUTION INTRAVENOUS at 06:01

## 2020-01-18 RX ADMIN — EPINEPHRINE 0.04 MCG/KG/MIN: 1 INJECTION PARENTERAL at 05:01

## 2020-01-18 RX ADMIN — DOBUTAMINE IN DEXTROSE 5 MCG/KG/MIN: 200 INJECTION, SOLUTION INTRAVENOUS at 10:01

## 2020-01-18 RX ADMIN — EPINEPHRINE 0.04 MCG/KG/MIN: 1 INJECTION PARENTERAL at 10:01

## 2020-01-18 RX ADMIN — PROPOFOL 30 MCG/KG/MIN: 10 INJECTION, EMULSION INTRAVENOUS at 11:01

## 2020-01-18 RX ADMIN — MUPIROCIN 1 G: 20 OINTMENT TOPICAL at 08:01

## 2020-01-18 RX ADMIN — HEPARIN SODIUM 5000 UNITS: 5000 INJECTION, SOLUTION INTRAVENOUS; SUBCUTANEOUS at 05:01

## 2020-01-18 RX ADMIN — MAGNESIUM SULFATE IN WATER 2 G: 40 INJECTION, SOLUTION INTRAVENOUS at 05:01

## 2020-01-18 RX ADMIN — FENTANYL CITRATE 50 MCG/HR: 50 INJECTION, SOLUTION INTRAMUSCULAR; INTRAVENOUS at 03:01

## 2020-01-18 RX ADMIN — PANTOPRAZOLE SODIUM 40 MG: 40 INJECTION, POWDER, FOR SOLUTION INTRAVENOUS at 09:01

## 2020-01-18 RX ADMIN — MAGNESIUM SULFATE IN WATER 2 G: 40 INJECTION, SOLUTION INTRAVENOUS at 04:01

## 2020-01-18 RX ADMIN — DOBUTAMINE IN DEXTROSE 5 MCG/KG/MIN: 200 INJECTION, SOLUTION INTRAVENOUS at 05:01

## 2020-01-18 RX ADMIN — PROPOFOL 35 MCG/KG/MIN: 10 INJECTION, EMULSION INTRAVENOUS at 02:01

## 2020-01-18 RX ADMIN — HEPARIN SODIUM 5000 UNITS: 5000 INJECTION, SOLUTION INTRAVENOUS; SUBCUTANEOUS at 09:01

## 2020-01-18 RX ADMIN — POLYETHYLENE GLYCOL 3350 17 G: 17 POWDER, FOR SOLUTION ORAL at 08:01

## 2020-01-18 RX ADMIN — PROPOFOL 30 MCG/KG/MIN: 10 INJECTION, EMULSION INTRAVENOUS at 04:01

## 2020-01-18 RX ADMIN — ATORVASTATIN CALCIUM 80 MG: 20 TABLET, FILM COATED ORAL at 08:01

## 2020-01-18 RX ADMIN — CARBOXYMETHYLCELLULOSE SODIUM 1 DROP: 10 GEL OPHTHALMIC at 01:01

## 2020-01-18 RX ADMIN — HEPARIN SODIUM 5000 UNITS: 5000 INJECTION, SOLUTION INTRAVENOUS; SUBCUTANEOUS at 01:01

## 2020-01-18 RX ADMIN — SODIUM CHLORIDE: 0.9 INJECTION, SOLUTION INTRAVENOUS at 05:01

## 2020-01-18 NOTE — ASSESSMENT & PLAN NOTE
"BG goal 140 - 180     Decrease Levemir to 16 units daily. Fasting BG below goal ranges. (20% dose decrease).   Low Dose SQ Insulin Correction Scale every 4 hours while NPO.   BG monitoring every 4 hours while NPO.     ** Please call Endocrine for any BG related issues **  ** Please notify Endocrine for any change and/or advance in diet**      Discharge Planning:     TBD. Please notify endocrinology prior to discharge. Patient will need adjustments to home insulin regimen.     Tentative:     Levemir 20 units HS.     Novolog 7 units TIDWM.     Low Dose SQ Insulin Correction Scale.    BG Monitoring AC/HS     PRN Insurance preferred diabetes testing supplies    PRN Ultra-Fine Junie Pen Needles 4mm x 32 G (5/32" x 0.23mm).     [place] [facility] [place] continued DM management.        "

## 2020-01-18 NOTE — SUBJECTIVE & OBJECTIVE
Interval History/Significant Events: Overnight pacer spiking and causing HD instability, even though HR was bam at 70-81. Pacer therefore turned off. No other acute events. CVP stable, 7 this am. Tolerating CRRT. Attempted to wean from nimbex but did not tolerate well.    Follow-up For: Procedure(s) (LRB):  CORONARY ARTERY BYPASS GRAFT (CABG) x 1     Off Pump (N/A)    Post-Operative Day: 4 Days Post-Op    Objective:     Vital Signs (Most Recent):  Temp: 98.4 °F (36.9 °C) (01/18/20 1500)  Pulse: 78 (01/18/20 1500)  Resp: (!) 24 (01/17/20 1930)  BP: 132/63 (01/18/20 1500)  SpO2: 97 % (01/18/20 1500) Vital Signs (24h Range):  Temp:  [97.8 °F (36.6 °C)-98.4 °F (36.9 °C)] 98.4 °F (36.9 °C)  Pulse:  [70-83] 78  Resp:  [24-50] 24  SpO2:  [90 %-98 %] 97 %  BP: (117-150)/(60-70) 132/63  Arterial Line BP: ()/(48-63) 122/54     Weight: 108.6 kg (239 lb 6.7 oz)  Body mass index is 41.1 kg/m².      Intake/Output Summary (Last 24 hours) at 1/18/2020 1529  Last data filed at 1/18/2020 1500  Gross per 24 hour   Intake 8345.8 ml   Output 8043 ml   Net 302.8 ml       Physical Exam   Constitutional: She appears well-developed and well-nourished. She is sedated and intubated.   HENT:   Head: Normocephalic and atraumatic.   Eyes: Pupils are equal, round, and reactive to light.   Neck: Normal range of motion. Neck supple.   Bilateral CVC, dressings CDI   Cardiovascular: Normal rate and regular rhythm.   Pulmonary/Chest: She is intubated.   Mechanically ventilated   Abdominal: Soft. She exhibits no distension. There is no rebound.   Musculoskeletal: Normal range of motion. She exhibits edema. She exhibits no deformity.   Neurological:   sedated   Skin: Skin is warm and dry. No rash noted. No erythema.       Vents:  Vent Mode: A/C (01/18/20 1310)  Ventilator Initiated: Yes (01/16/20 1448)  Set Rate: 24 bmp (01/18/20 1310)  Vt Set: 325 mL (01/18/20 1310)  PEEP/CPAP: 7 cmH20 (01/18/20 1310)  Oxygen Concentration (%): 50 (01/18/20  1500)  Peak Airway Pressure: 19 cmH2O (01/18/20 1310)  Plateau Pressure: 0 cmH20 (01/18/20 1310)  Total Ve: 7.54 mL (01/18/20 1310)  F/VT Ratio<105 (RSBI): 138.12 (01/17/20 1823)    Lines/Drains/Airways     Central Venous Catheter Line                 Introducer right internal jugular -- days         Trialysis (Dialysis) Catheter left internal jugular -- days         Percutaneous Central Line Insertion/Assessment - double lumen  01/14/20 0725 4 days          Drain                 Chest Tube 01/14/20 1017 1 Left Pleural 19 Fr. 4 days         Urethral Catheter 01/14/20 0720 Non-latex;Temperature probe;Straight-tip 14 Fr. 4 days         Y Chest Tube 1 and 2 01/14/20 1018 1 Anterior Mediastinal 19 Fr. 2 Anterior Mediastinal 19 Fr. 4 days         NG/OG Tube 01/16/20 1446 orogastric 2 days          Airway                 Airway - Non-Surgical 01/16/20 1442 Endotracheal Tube 2 days          Arterial Line                 Arterial Line 01/16/20 2300 Right Radial 1 day          Line                 Pacer Wires 01/14/20 1009 4 days                Significant Labs:    CBC/Anemia Profile:  Recent Labs   Lab 01/17/20  1403  01/18/20  0300 01/18/20  0310 01/18/20  0606 01/18/20  0910   WBC 18.57*  --  13.00*  --   --  12.37   HGB 9.2*  --  8.3*  --   --  8.1*   HCT 27.8*   < > 26.6* 24* 22* 26.1*   *  --  141*  --   --  143*   MCV 93  --  94  --   --  96   RDW 17.2*  --  17.2*  --   --  17.4*    < > = values in this interval not displayed.        Chemistries:  Recent Labs   Lab 01/17/20  2139 01/18/20  0300 01/18/20  0910 01/18/20  1330     139 138  138  138 139 138   K 4.4  4.4 4.3  4.3  4.3 4.3 4.3     105 104  104  104 105 105   CO2 25  25 24  24  24 26 27   BUN 7  7 4*  4*  4* 3* 3*   CREATININE 1.0  1.0 0.7  0.7  0.7 0.7 0.7   CALCIUM 8.4*  8.4* 8.3*  8.3*  8.3* 8.3* 8.1*   ALBUMIN 3.0*  3.0* 2.9*  2.9* 2.8* 2.7*   PROT 6.4 6.2 6.2  --    BILITOT 2.5* 2.5* 2.7*  --    ALKPHOS 120 124  124  --    * 849* 785*  --    * 393* 336*  --    MG 2.0 1.9  --  1.9   PHOS 2.3* 3.3  --  2.0*       ABGs:   Recent Labs   Lab 01/18/20  1133   PH 7.379   PCO2 43.6   HCO3 25.7   POCSATURATED 98   BE 1     Coagulation:   Recent Labs   Lab 01/17/20  1404  01/18/20  0910   INR 1.2   < > 1.1   APTT 24.9  --   --     < > = values in this interval not displayed.     Lactic Acid: No results for input(s): LACTATE in the last 48 hours.    Significant Imaging:  I have reviewed all pertinent imaging results/findings within the past 24 hours.

## 2020-01-18 NOTE — ASSESSMENT & PLAN NOTE
Neuro:   - sedated with fentanyl/propofol  - continue nimbex     Pulmonary:   - Worsening respiratory distress, intubated 1/16  - Wean vent settings  - Daily CXR  - hourly ABG  - check CVPs often, wean Nitric as able with plans to wean to 5 at 1200, 4 at 1800, and 3 at 0000     Cardiac:  - Requiring high levels of epi, vaso, levo and dobutamine over the past few days; wean as able  - Epi @ 0.04, wean as able  - Echo 1/17:            Mildly decreased left ventricular systolic function. The estimated ejection fraction is 50%.  · Concentric left ventricular remodeling.  · Local segmental wall motion abnormalities.  · Septal wall has abnormal motion.  · Grade I (mild) left ventricular diastolic dysfunction consistent with impaired relaxation.  · Normal right ventricular systolic function.  · Severe tricuspid regurgitation.   -Pacer turned off for time being    Renal:   - CRRT on, tolerating well  - UF @ 350  - BUN/Cr ok, improving  - Kerns in place for close monitoring of UOP     Infectious Disease:   - Afebrile, WBC 13  - Antibiotics: Perioperative     Hematology/Oncology:  - monitor H/H, today 8.3/26.6  - Transfused pRBC x2 1/16, appropriate response  - Continue to monitor CT output     Endocrine:  - Endocrine consulted, keep blood glucose goals 140-180     Fluids/Electrolytes/Nutrition/GI:   - D5 NS 20 KCl @ 5  - Trend CMP  - LFTs trending down  - Replace Lytes PRN   - NPO  - Scopolamine patch, PRN Zofran     Prophylaxis:  - GI: protonix daily     Dispo:  Continue ICU care, continue close monitoring of ABGs/wean vent. Will continue nimbex today to rest, wean nitric.  Primary team: JI

## 2020-01-18 NOTE — PROGRESS NOTES
[facility]  Critical Care - Surgery  Progress Note    Patient Name: Suyapa Connelly  MRN: [id number]  Admission Date: 1/2/2020  Hospital Length of Stay: 16 days  Code Status: Prior  Attending Provider: Huang Altamirano MD  Primary Care Provider: Erlinda Rahman NP   Principal Problem: Acute coronary syndrome    Subjective:     Hospital/ICU Course:  1/15: POD1. ON nausea without emesis. Zofran. 2L albumin, 5 amps bicarb. Clinically improving. 250 albumin running.   1/16: Increasing pressor requirements. Intubated for poor oxygenation, Left IJ trialysis placed for CRRT. Nitric added for RV support. Transfused pRBC x2 overnight.     Interval History/Significant Events: Overnight pacer spiking and causing HD instability, even though HR was bam at 70-81. Pacer therefore turned off. No other acute events. CVP stable, 7 this am. Tolerating CRRT. Attempted to wean from nimbex but did not tolerate well.    Follow-up For: Procedure(s) (LRB):  CORONARY ARTERY BYPASS GRAFT (CABG) x 1     Off Pump (N/A)    Post-Operative Day: 4 Days Post-Op    Objective:     Vital Signs (Most Recent):  Temp: 98.4 °F (36.9 °C) (01/18/20 1500)  Pulse: 78 (01/18/20 1500)  Resp: (!) 24 (01/17/20 1930)  BP: 132/63 (01/18/20 1500)  SpO2: 97 % (01/18/20 1500) Vital Signs (24h Range):  Temp:  [97.8 °F (36.6 °C)-98.4 °F (36.9 °C)] 98.4 °F (36.9 °C)  Pulse:  [70-83] 78  Resp:  [24-50] 24  SpO2:  [90 %-98 %] 97 %  BP: (117-150)/(60-70) 132/63  Arterial Line BP: ()/(48-63) 122/54     Weight: 108.6 kg (239 lb 6.7 oz)  Body mass index is 41.1 kg/m².      Intake/Output Summary (Last 24 hours) at 1/18/2020 1529  Last data filed at 1/18/2020 1500  Gross per 24 hour   Intake 8345.8 ml   Output 8043 ml   Net 302.8 ml       Physical Exam   Constitutional: She appears well-developed and well-nourished. She is sedated and intubated.   HENT:   Head: Normocephalic and atraumatic.   Eyes: Pupils are equal, round, and reactive to light.    Neck: Normal range of motion. Neck supple.   Bilateral CVC, dressings CDI   Cardiovascular: Normal rate and regular rhythm.   Pulmonary/Chest: She is intubated.   Mechanically ventilated   Abdominal: Soft. She exhibits no distension. There is no rebound.   Musculoskeletal: Normal range of motion. She exhibits edema. She exhibits no deformity.   Neurological:   sedated   Skin: Skin is warm and dry. No rash noted. No erythema.       Vents:  Vent Mode: A/C (01/18/20 1310)  Ventilator Initiated: Yes (01/16/20 1448)  Set Rate: 24 bmp (01/18/20 1310)  Vt Set: 325 mL (01/18/20 1310)  PEEP/CPAP: 7 cmH20 (01/18/20 1310)  Oxygen Concentration (%): 50 (01/18/20 1500)  Peak Airway Pressure: 19 cmH2O (01/18/20 1310)  Plateau Pressure: 0 cmH20 (01/18/20 1310)  Total Ve: 7.54 mL (01/18/20 1310)  F/VT Ratio<105 (RSBI): 138.12 (01/17/20 1823)    Lines/Drains/Airways     Central Venous Catheter Line                 Introducer right internal jugular -- days         Trialysis (Dialysis) Catheter left internal jugular -- days         Percutaneous Central Line Insertion/Assessment - double lumen  01/14/20 0725 4 days          Drain                 Chest Tube 01/14/20 1017 1 Left Pleural 19 Fr. 4 days         Urethral Catheter 01/14/20 0720 Non-latex;Temperature probe;Straight-tip 14 Fr. 4 days         Y Chest Tube 1 and 2 01/14/20 1018 1 Anterior Mediastinal 19 Fr. 2 Anterior Mediastinal 19 Fr. 4 days         NG/OG Tube 01/16/20 1446 orogastric 2 days          Airway                 Airway - Non-Surgical 01/16/20 1442 Endotracheal Tube 2 days          Arterial Line                 Arterial Line 01/16/20 2300 Right Radial 1 day          Line                 Pacer Wires 01/14/20 1009 4 days                Significant Labs:    CBC/Anemia Profile:  Recent Labs   Lab 01/17/20  1403  01/18/20  0300 01/18/20  0310 01/18/20  0606 01/18/20  0910   WBC 18.57*  --  13.00*  --   --  12.37   HGB 9.2*  --  8.3*  --   --  8.1*   HCT 27.8*   < > 26.6*  24* 22* 26.1*   *  --  141*  --   --  143*   MCV 93  --  94  --   --  96   RDW 17.2*  --  17.2*  --   --  17.4*    < > = values in this interval not displayed.        Chemistries:  Recent Labs   Lab 01/17/20  2139 01/18/20  0300 01/18/20  0910 01/18/20  1330     139 138  138  138 139 138   K 4.4  4.4 4.3  4.3  4.3 4.3 4.3     105 104  104  104 105 105   CO2 25  25 24  24  24 26 27   BUN 7  7 4*  4*  4* 3* 3*   CREATININE 1.0  1.0 0.7  0.7  0.7 0.7 0.7   CALCIUM 8.4*  8.4* 8.3*  8.3*  8.3* 8.3* 8.1*   ALBUMIN 3.0*  3.0* 2.9*  2.9* 2.8* 2.7*   PROT 6.4 6.2 6.2  --    BILITOT 2.5* 2.5* 2.7*  --    ALKPHOS 120 124 124  --    * 849* 785*  --    * 393* 336*  --    MG 2.0 1.9  --  1.9   PHOS 2.3* 3.3  --  2.0*       ABGs:   Recent Labs   Lab 01/18/20  1133   PH 7.379   PCO2 43.6   HCO3 25.7   POCSATURATED 98   BE 1     Coagulation:   Recent Labs   Lab 01/17/20  1404  01/18/20  0910   INR 1.2   < > 1.1   APTT 24.9  --   --     < > = values in this interval not displayed.     Lactic Acid: No results for input(s): LACTATE in the last 48 hours.    Significant Imaging:  I have reviewed all pertinent imaging results/findings within the past 24 hours.    Assessment/Plan:     Acute on chronic combined systolic (congestive) and diastolic (congestive) heart failure  Neuro:   - sedated with fentanyl/propofol  - continue nimbex     Pulmonary:   - Worsening respiratory distress, intubated 1/16  - Wean vent settings  - Daily CXR  - hourly ABG  - check CVPs often, wean Nitric as able with plans to wean to 5 at 1200, 4 at 1800, and 3 at 0000     Cardiac:  - Requiring high levels of epi, vaso, levo and dobutamine over the past few days; wean as able  - Epi @ 0.04, wean as able  - Echo 1/17:            Mildly decreased left ventricular systolic function. The estimated ejection fraction is 50%.  · Concentric left ventricular remodeling.  · Local segmental wall motion  abnormalities.  · Septal wall has abnormal motion.  · Grade I (mild) left ventricular diastolic dysfunction consistent with impaired relaxation.  · Normal right ventricular systolic function.  · Severe tricuspid regurgitation.   -Pacer turned off for time being    Renal:   - CRRT on, tolerating well  - UF @ 350  - BUN/Cr ok, improving  - Kerns in place for close monitoring of UOP     Infectious Disease:   - Afebrile, WBC 13  - Antibiotics: Perioperative     Hematology/Oncology:  - monitor H/H, today 8.3/26.6  - Transfused pRBC x2 1/16, appropriate response  - Continue to monitor CT output     Endocrine:  - Endocrine consulted, keep blood glucose goals 140-180     Fluids/Electrolytes/Nutrition/GI:   - D5 NS 20 KCl @ 5  - Trend CMP  - LFTs trending down  - Replace Lytes PRN   - NPO  - Scopolamine patch, PRN Zofran     Prophylaxis:  - GI: protonix daily     Dispo:  Continue ICU care, continue close monitoring of ABGs/wean vent. Will continue nimbex today to rest, wean nitric.  Primary team: CTS    Critical care was time spent personally by me on the following activities: development of treatment plan with patient or surrogate and bedside caregivers, discussions with consultants, evaluation of patient's response to treatment, examination of patient, ordering and performing treatments and interventions, ordering and review of laboratory studies, ordering and review of radiographic studies, pulse oximetry, re-evaluation of patient's condition.  This critical care time did not overlap with that of any other provider or involve time for any procedures.     Christina Galvez MD  Critical Care - Surgery  [facility]

## 2020-01-18 NOTE — PLAN OF CARE
Problem: Adult Inpatient Plan of Care  Goal: Plan of Care Review  1/18/2020 0623 by Tabatha Villanueva RN  Outcome: Ongoing, Progressing  Pt remains intubated, sedated, and paralyzed with settings A/C 50% FiO2 7 peep 325 Vt, with 40 ppm of nitric. Gtts: Nimbex 2 mcg/min with TOF 2/4, dobutamine at 5 mcg/kg/min, propofol at 35 mcg/kg/min with BIS 30's-40's, fentanyl 50 mcg/hr, epi at 0.04mcg/kg/min. Pt tolerating CRRT with -400. CVP 10, 7, 7. Pt made 40cc of concentrated yellow urine. No output from chest tubes. See flowsheets for assessments and intake and output. Plan of care reviewed with patients spouse. Will continue to monitor.

## 2020-01-18 NOTE — PROGRESS NOTES
[facility]  Nephrology  Progress Note    Patient Name: Suyapa Connelly  MRN: [id number]  Admission Date: 1/2/2020  Hospital Length of Stay: 16 days  Attending Provider: Huang Altamirano MD   Primary Care Physician: Erlinda Rahman NP  Principal Problem:Acute coronary syndrome    Subjective:     HPI: 55 y/o AAF with known medical medical issues of chronic combined systolic and diastolic HF (EF 35% Grade II DD), CAD S/P MI 2010, Angiogram 12/18/19 mid LAD 90% stenosis, mid OM2 75% stenosis, and Ostial D1 70% stenosis LVDEP 25 mmHg, PAD S/P PTA with stents BLE 2015, IDDM who presented to the ED with SOB and chest pain, found to have an NSTEMI which was treated medically with asa, heparin gtt. CTA of the lower extremities performed on 01/03 to investigate vasculature useful for CABG. Creatinine 3.6 up from a baseline of 1.2. She reports decreased urine output since the CTA but no increased swelling of the lower extremities. No SOB. She an episode of hypotension on 01/04 with a systolic of 80. She was on Lasix IV BID, last dose on 01/04 at 9:00 am. Nephrology consulted for oliguric ROSLYN.      Interval History: NAEON reported. On continuous SLED .    Review of patient's allergies indicates:   Allergen Reactions    Contrast media      Kidney injury    Pcn [penicillins]      Rash; tolerated ceftriaxone on 1/13/20     Current Facility-Administered Medications   Medication Frequency    0.9%  NaCl infusion (CRRT USE ONLY) Continuous    acetaminophen tablet 650 mg Q4H PRN    acetaminophen tablet 650 mg Q6H PRN    amiodarone 360 mg/200 mL (1.8 mg/mL) infusion Continuous    aspirin chewable tablet 81 mg Daily    atorvastatin tablet 80 mg QHS    bisacodyl suppository 10 mg Q12H PRN    carboxymethylcellulose sodium 1 % DpGe 1 drop Q8H    cisatracurium (NIMBEX) 200 mg in dextrose 5 % 100 mL infusion Continuous    clopidogrel tablet 75 mg Daily    dextrose 10% (D10W) Bolus PRN    dextrose 10%  (D10W) Bolus PRN    dextrose 5 % and 0.9 % NaCl with KCl 20 mEq infusion Continuous    DOBUTamine 500mg in D5W 250mL infusion (premix) (NON-TITRATING) Continuous    EPINEPHrine (ADRENALIN) 5 mg in sodium chloride 0.9% 250 mL infusion Continuous    fentaNYL 2500 mcg in 0.9% sodium chloride 250 mL infusion premix (titrating) Continuous    glucagon (human recombinant) injection 1 mg PRN    heparin (porcine) injection 5,000 Units Q8H    HYDROmorphone injection 0.2 mg Q1H PRN    insulin aspart U-100 pen 0-5 Units Q4H PRN    insulin detemir U-100 pen 16 Units Daily    magnesium sulfate 2g in water 50mL IVPB (premix) PRN    metoclopramide HCl injection 10 mg Q6H PRN    mupirocin 2 % ointment 1 g BID    niCARdipine 40 mg/200 mL infusion Continuous    nitric oxide gas Gas 40 ppm Continuous    norepinephrine 4 mg in dextrose 5% 250 mL infusion (premix) (titrating) Continuous    ondansetron disintegrating tablet 8 mg Q8H PRN    oxyCODONE immediate release tablet 10 mg Q4H PRN    oxyCODONE immediate release tablet 5 mg Q4H PRN    pantoprazole injection 40 mg Daily    polyethylene glycol packet 17 g Daily    propofol (DIPRIVAN) 10 mg/mL infusion Continuous    scopolamine 1.3-1.5 mg (1 mg over 3 days) 1 patch Q3 Days    sodium phosphate 20.01 mmol in dextrose 5 % 250 mL IVPB PRN    sodium phosphate 30 mmol in dextrose 5 % 250 mL IVPB PRN    sodium phosphate 39.99 mmol in dextrose 5 % 250 mL IVPB PRN    vasopressin (PITRESSIN) 0.2 Units/mL in dextrose 5 % 100 mL infusion Continuous       Objective:     Vital Signs (Most Recent):  Temp: 98.4 °F (36.9 °C) (01/18/20 1500)  Pulse: 78 (01/18/20 1645)  Resp: (!) 24 (01/17/20 1930)  BP: 135/63 (01/18/20 1630)  SpO2: 97 % (01/18/20 1645)  O2 Device (Oxygen Therapy): ventilator (01/18/20 1530) Vital Signs (24h Range):  Temp:  [97.8 °F (36.6 °C)-98.4 °F (36.9 °C)] 98.4 °F (36.9 °C)  Pulse:  [70-81] 78  Resp:  [24-50] 24  SpO2:  [93 %-98 %] 97 %  BP:  (117-150)/(60-70) 135/63  Arterial Line BP: ()/(48-63) 119/52     Weight: 108.6 kg (239 lb 6.7 oz) (01/18/20 0558)  Body mass index is 41.1 kg/m².  Body surface area is 2.21 meters squared.    I/O last 3 completed shifts:  In: 15852.1 [I.V.:9514.8; Blood:791.3; NG/GT:260; IV Piggyback:250]  Out: 52517 [Urine:150; Drains:25; Other:24249; Chest Tube:90]    Physical Exam   Constitutional: She appears well-developed and well-nourished.   HENT:   Head: Normocephalic and atraumatic.   Eyes: Pupils are equal, round, and reactive to light. Conjunctivae are normal.   Neck: Neck supple.   Cardiovascular: Normal rate.   Pulmonary/Chest: She has no wheezes. She has rales.   Intubated and mechanically ventilated. Chest tubes in place    Abdominal: Soft. She exhibits no distension and no mass. There is no guarding. No hernia.   Neurological:   Sedated   Skin: Skin is warm and dry.       Significant Labs:  CBC:   Recent Labs   Lab 01/18/20  1525   WBC 12.24   RBC 2.78*   HGB 8.3*   HCT 26.5*   *   MCV 95   MCH 29.9   MCHC 31.3*     CMP:   Recent Labs   Lab 01/18/20  1525   GLU 93   CALCIUM 8.5*   ALBUMIN 2.9*   PROT 6.4      K 4.5   CO2 25      BUN 3*   CREATININE 0.7   ALKPHOS 179*   *   *   BILITOT 2.9*     All labs within the past 24 hours have been reviewed.         Assessment/Plan:     ROSLYN (acute kidney injury)  At time of re-consult: Ms Connelly is a 55 y/o female who is s/p CABG. Nephrology re-consulted for ROSLYN, decreased UOP, and concern for volume overload. Patient is now anuric with sCr trending up (4.4 on 1/16/20). Patient with metabolic acidosis on ABG/serum CO2 of 19. Patient now with concern for volume overload and now intubated after progressively increased oxygen requirements. Concern for pulmonary edema on CXR.     Plan/recommendations:     -Continue SLED for metabolic clearance and volume management. UF goal 300- 350cc/hr.   -Strict I/O's   -Renally dose medications   -Avoid  nephrotoxic medications  -Labs per CRRT protocol  -Maintain Hgb >7.0  -Will continue to follow closely                Thank you for your consult. I will follow-up with patient. Please contact us if you have any additional questions.    Winston Hampton MD  Nephrology  [facility]

## 2020-01-18 NOTE — PROGRESS NOTES
Cardiothoracic Surgery  Progress Note      Admit Date: 1/2/2020    Disease Etiology: Ischemic  Open Chest: no  Surgery Performed: 1v OPCAB 1/14/2020    ASSESSMENT/PLAN:     I conducted multidisciplinary rounds in conjunction with the ICU/Critical Care attending staff and/or residents, with involvement of ancillary services as appropriate. The patient's general condition was reviewed, specifically including hemodynamic performance, dietary and nutritional status, pharmacy concerns, and status of expected transition to stepdown care. Plan has been discussed and agreed upon. CRRT at 350 today, keep paralysis, wean Gracia to 3 by midnight, q4 ABGs, wean PEEP as able.     For details see the critical care note.    Rosio Flood MD  Cardiac Surgery Resident, PGY7  Cardiothoracic Surgery  [facility]

## 2020-01-18 NOTE — SUBJECTIVE & OBJECTIVE
Interval History: NAEON reported. On continuous SLED .    Review of patient's allergies indicates:   Allergen Reactions    Contrast media      Kidney injury    Pcn [penicillins]      Rash; tolerated ceftriaxone on 1/13/20     Current Facility-Administered Medications   Medication Frequency    0.9%  NaCl infusion (CRRT USE ONLY) Continuous    acetaminophen tablet 650 mg Q4H PRN    acetaminophen tablet 650 mg Q6H PRN    amiodarone 360 mg/200 mL (1.8 mg/mL) infusion Continuous    aspirin chewable tablet 81 mg Daily    atorvastatin tablet 80 mg QHS    bisacodyl suppository 10 mg Q12H PRN    carboxymethylcellulose sodium 1 % DpGe 1 drop Q8H    cisatracurium (NIMBEX) 200 mg in dextrose 5 % 100 mL infusion Continuous    clopidogrel tablet 75 mg Daily    dextrose 10% (D10W) Bolus PRN    dextrose 10% (D10W) Bolus PRN    dextrose 5 % and 0.9 % NaCl with KCl 20 mEq infusion Continuous    DOBUTamine 500mg in D5W 250mL infusion (premix) (NON-TITRATING) Continuous    EPINEPHrine (ADRENALIN) 5 mg in sodium chloride 0.9% 250 mL infusion Continuous    fentaNYL 2500 mcg in 0.9% sodium chloride 250 mL infusion premix (titrating) Continuous    glucagon (human recombinant) injection 1 mg PRN    heparin (porcine) injection 5,000 Units Q8H    HYDROmorphone injection 0.2 mg Q1H PRN    insulin aspart U-100 pen 0-5 Units Q4H PRN    insulin detemir U-100 pen 16 Units Daily    magnesium sulfate 2g in water 50mL IVPB (premix) PRN    metoclopramide HCl injection 10 mg Q6H PRN    mupirocin 2 % ointment 1 g BID    niCARdipine 40 mg/200 mL infusion Continuous    nitric oxide gas Gas 40 ppm Continuous    norepinephrine 4 mg in dextrose 5% 250 mL infusion (premix) (titrating) Continuous    ondansetron disintegrating tablet 8 mg Q8H PRN    oxyCODONE immediate release tablet 10 mg Q4H PRN    oxyCODONE immediate release tablet 5 mg Q4H PRN    pantoprazole injection 40 mg Daily    polyethylene glycol packet 17 g Daily     propofol (DIPRIVAN) 10 mg/mL infusion Continuous    scopolamine 1.3-1.5 mg (1 mg over 3 days) 1 patch Q3 Days    sodium phosphate 20.01 mmol in dextrose 5 % 250 mL IVPB PRN    sodium phosphate 30 mmol in dextrose 5 % 250 mL IVPB PRN    sodium phosphate 39.99 mmol in dextrose 5 % 250 mL IVPB PRN    vasopressin (PITRESSIN) 0.2 Units/mL in dextrose 5 % 100 mL infusion Continuous       Objective:     Vital Signs (Most Recent):  Temp: 98.4 °F (36.9 °C) (01/18/20 1500)  Pulse: 78 (01/18/20 1645)  Resp: (!) 24 (01/17/20 1930)  BP: 135/63 (01/18/20 1630)  SpO2: 97 % (01/18/20 1645)  O2 Device (Oxygen Therapy): ventilator (01/18/20 1530) Vital Signs (24h Range):  Temp:  [97.8 °F (36.6 °C)-98.4 °F (36.9 °C)] 98.4 °F (36.9 °C)  Pulse:  [70-81] 78  Resp:  [24-50] 24  SpO2:  [93 %-98 %] 97 %  BP: (117-150)/(60-70) 135/63  Arterial Line BP: ()/(48-63) 119/52     Weight: 108.6 kg (239 lb 6.7 oz) (01/18/20 0558)  Body mass index is 41.1 kg/m².  Body surface area is 2.21 meters squared.    I/O last 3 completed shifts:  In: 59423.1 [I.V.:9514.8; Blood:791.3; NG/GT:260; IV Piggyback:250]  Out: 42468 [Urine:150; Drains:25; Other:82448; Chest Tube:90]    Physical Exam   Constitutional: She appears well-developed and well-nourished.   HENT:   Head: Normocephalic and atraumatic.   Eyes: Pupils are equal, round, and reactive to light. Conjunctivae are normal.   Neck: Neck supple.   Cardiovascular: Normal rate.   Pulmonary/Chest: She has no wheezes. She has rales.   Intubated and mechanically ventilated. Chest tubes in place    Abdominal: Soft. She exhibits no distension and no mass. There is no guarding. No hernia.   Neurological:   Sedated   Skin: Skin is warm and dry.       Significant Labs:  CBC:   Recent Labs   Lab 01/18/20  1525   WBC 12.24   RBC 2.78*   HGB 8.3*   HCT 26.5*   *   MCV 95   MCH 29.9   MCHC 31.3*     CMP:   Recent Labs   Lab 01/18/20  1525   GLU 93   CALCIUM 8.5*   ALBUMIN 2.9*   PROT 6.4       K 4.5   CO2 25      BUN 3*   CREATININE 0.7   ALKPHOS 179*   *   *   BILITOT 2.9*     All labs within the past 24 hours have been reviewed.

## 2020-01-18 NOTE — SUBJECTIVE & OBJECTIVE
"Interval HPI:   Overnight events: Remains intubated and sedated in SICU. BG below goal ranges this morning on current SQ insulin regimen.  Eating:   NPO  Nausea: No  Hypoglycemia and intervention: No  Fever: No  TPN and/or TF: No  If yes, type of TF/TPN and rate: none    /60   Pulse 77   Temp 98.1 °F (36.7 °C) (Oral)   Resp (!) 24   Ht 5' 4" (1.626 m)   Wt 108.6 kg (239 lb 6.7 oz)   LMP 09/30/2015 (Exact Date)   SpO2 97%   Breastfeeding? No   BMI 41.10 kg/m²     Labs Reviewed and Include    Recent Labs   Lab 01/18/20  0300     107  107   CALCIUM 8.3*  8.3*  8.3*   ALBUMIN 2.9*  2.9*   PROT 6.2     138  138   K 4.3  4.3  4.3   CO2 24  24  24     104  104   BUN 4*  4*  4*   CREATININE 0.7  0.7  0.7   ALKPHOS 124   *   *   BILITOT 2.5*     Lab Results   Component Value Date    WBC 13.00 (H) 01/18/2020    HGB 8.3 (L) 01/18/2020    HCT 22 (L) 01/18/2020    MCV 94 01/18/2020     (L) 01/18/2020     No results for input(s): TSH, FREET4 in the last 168 hours.  Lab Results   Component Value Date    HGBA1C 9.7 (H) 01/03/2020       Nutritional status:   Body mass index is 41.1 kg/m².  Lab Results   Component Value Date    ALBUMIN 2.9 (L) 01/18/2020    ALBUMIN 2.9 (L) 01/18/2020    ALBUMIN 3.0 (L) 01/17/2020    ALBUMIN 3.0 (L) 01/17/2020     No results found for: PREALBUMIN    Estimated Creatinine Clearance: 112 mL/min (based on SCr of 0.7 mg/dL).    Accu-Checks  Recent Labs     01/16/20  1135 01/16/20  1536 01/16/20  1929 01/16/20  2356 01/17/20  0309 01/17/20  0908 01/17/20  1139 01/17/20  1629 01/17/20  2005 01/18/20  0312   POCTGLUCOSE 295* 310* 278* 215* 155* 148* 135* 109 113* 127*       Current Medications and/or Treatments Impacting Glycemic Control  Immunotherapy:    Immunosuppressants     None        Steroids:   Hormones (From admission, onward)    Start     Stop Route Frequency Ordered    01/14/20 1230  vasopressin (PITRESSIN) 0.2 Units/mL in " dextrose 5 % 100 mL infusion      -- IV Continuous 01/14/20 1129        Pressors:    Autonomic Drugs (From admission, onward)    Start     Stop Route Frequency Ordered    01/17/20 0915  cisatracurium (NIMBEX) 200 mg in dextrose 5 % 100 mL infusion     Question Answer Comment   Bolus over 1 minute (in mg): 10    Begin at (in mcg/kg/min): 1    Titrate by: (in mcg/kg/min) 0.05    Titrate interval: (in minutes) 15    To maintain a train-of-four of (TOF_/4): 4/4    Maximum dose: (in mcg/kg/min) 3        -- IV Continuous 01/17/20 0913    01/16/20 1430  succinylcholine (ANECTINE) 20 mg/mL injection     Note to Pharmacy:  Created by cabinet override    01/17 0244   01/16/20 1430    01/14/20 1853  rocuronium 10 mg/mL injection     Note to Pharmacy:  Created by cabinet override    01/15 0659   01/14/20 1853    01/14/20 1230  EPINEPHrine (ADRENALIN) 5 mg in sodium chloride 0.9% 250 mL infusion     Question Answer Comment   Titrate by: (in mcg/kg/min) 0.01    Titrate interval: (in minutes) 5    Titrate to maintain: (SBP or MAP or Cardiac Index) SBP    Maximum dose: (in mcg/kg/min) 2        -- IV Continuous 01/14/20 1129    01/14/20 1230  norepinephrine 4 mg in dextrose 5% 250 mL infusion (premix) (titrating)     Question Answer Comment   Titrate by: (in mcg/kg/min) 0.05    Titrate interval: (in minutes) 5    Titrate to maintain: (MAP or SBP) MAP    Greater than: (in mmHg) 65    Maximum dose: (in mcg/kg/min) 3        -- IV Continuous 01/14/20 1129        Hyperglycemia/Diabetes Medications:   Antihyperglycemics (From admission, onward)    Start     Stop Route Frequency Ordered    01/16/20 1030  insulin detemir U-100 pen 20 Units      -- SubQ Daily 01/16/20 0906    01/15/20 0946  insulin aspart U-100 pen 0-5 Units      -- SubQ Every 4 hours PRN 01/15/20 0847

## 2020-01-18 NOTE — PHYSICIAN QUERY
PT Name: Suyapa Connelly  MR #: [id number]    Physician Query Form - Respiratory Condition Clarification      CDS/: Theodora Mallory               Contact information:[email or web address]     This form is a permanent document in the medical record.    Query Date: January 17, 2020    By submitting this query, we are merely seeking further clarification of documentation. Please utilize your independent clinical judgment when addressing the question(s) below.    The Medical record contains the following   Indicators   Supporting Clinical Findings Location in Medical Record   x   SOB, HARTMAN, Wheezing, Productive Cough, Use of Accessory Muscles, etc. SOB Anesthesia pre procedure 1-6   x   Acute/Chronic Illness Acute on chronic combined systolic (congestive) and diastolic (congestive) heart failure       Radiology Findings     x   Respiratory Distress or Failure Worsening respiratory distress Critical care surgery 1-17      Hypoxia or Hypercapnia     x   RR         ABGs         O2 sat RR=15 to 25  O2 sat=80 to 100%      x   BiPAP/Intubation Non surgical airway      ET Tube    1-14 @0845  Removed 1-14 @1144    1-16 @1442   x   Supplemental O2 Simple face mask 10 lnc    Non rebreather mask     Comfort flow 70 oxygen,  30 flow l/min Vs record 1-14    Vs record 1-14 @1619    Vs record 1-15      Home O2, Oxygen Dependence        Treatment     x   Other Patient was admitted to hospital medicine Team 2 for further management and work up. Patient has now subsequently undergone one vessel CABG (LIMA to LAD) off pump. Procedure went according to plan, no blood products given. Was extubated in OR, given methadone and precedex for pain control.  Critical care surgery  H&P 1-14     Respiratory failure can be acute, chronic or both. It is generally further specified as hypoxic, hypercapnic or both. Lastly, it is important to identify an etiology, if known or suspected.   References::   https://www.acphospitalist.org/archives/2013/10/coding.htm http://[email or web address]/acute-respiratory-failure-know     The clinical guidelines noted below are only system guidelines, and do not replace the providers clinical judgment.    Provider, please specify diagnosis or diagnoses associated with above clinical findings.   [  x ] Acute Respiratory Failure with Hypoxia - ABG pO2 < 60 mmHg or O2 sat of 88% on RA and respiratory symptoms documented   [   ] Other Respiratory Diagnosis (please specify): _________________________________   [   ]  Clinically Undetermined       Please document in your progress notes daily for the duration of treatment until resolved and include in your discharge summary.

## 2020-01-18 NOTE — PROGRESS NOTES
notified of patient desaturating to the 70's-80's since the nimbex has been off. Orders received to turn nimbex back on and will reassess turning off paralytic tomorrow morning. Will continue to monitor.

## 2020-01-18 NOTE — ASSESSMENT & PLAN NOTE
At time of re-consult: Ms Connelly is a 55 y/o female who is s/p CABG. Nephrology re-consulted for ROSLYN, decreased UOP, and concern for volume overload. Patient is now anuric with sCr trending up (4.4 on 1/16/20). Patient with metabolic acidosis on ABG/serum CO2 of 19. Patient now with concern for volume overload and now intubated after progressively increased oxygen requirements. Concern for pulmonary edema on CXR.     Plan/recommendations:     -Continue SLED for metabolic clearance and volume management. UF goal 300- 350cc/hr.   -Strict I/O's   -Renally dose medications   -Avoid nephrotoxic medications  -Labs per CRRT protocol  -Maintain Hgb >7.0  -Will continue to follow closely

## 2020-01-18 NOTE — PROGRESS NOTES
"[facility]  Endocrinology  Progress Note    Admit Date: 1/2/2020     Reason for Consult: Management of T2DM, Hyperglycemia     Surgical Procedure and Date: n/a    Diabetes diagnosis year:  2006    Home Diabetes Medications:  Lantus 50 units daily and novolog 9 units with meals   Lab Results   Component Value Date    HGBA1C 9.7 (H) 01/03/2020         How often checking glucose at home? 1-3 x day   BG readings on regimen: 100-200 typically; sometimes in 300s   Hypoglycemia on the regimen?  Yes occasionally - usually overnight   Missed doses on regimen?  Yes    Diabetes Complications include:     Hyperglycemia, Hypoglycemia  and Diabetic peripheral neuropathy     Complicating diabetes co morbidities:   History of MI and CHF      HPI:   Patient is a 54 y.o. female with a diagnosis of uncontrolled type 2 DM. Also with CHF, PAD, HTN, HLD, and CAD. Patient presented with chest pain and diaphoresis. Also with nauseated, diaphoretic, a mild headache, epigastric pain, with a vague "heart burn" like feeling in her chest. Patient admitted for treatment and further workup. Endocrinology consulted for BG/ DM management.             Interval HPI:   Overnight events: Remains intubated and sedated in SICU. BG below goal ranges this morning on current SQ insulin regimen.  Eating:   NPO  Nausea: No  Hypoglycemia and intervention: No  Fever: No  TPN and/or TF: No  If yes, type of TF/TPN and rate: none    /60   Pulse 77   Temp 98.1 °F (36.7 °C) (Oral)   Resp (!) 24   Ht 5' 4" (1.626 m)   Wt 108.6 kg (239 lb 6.7 oz)   LMP 09/30/2015 (Exact Date)   SpO2 97%   Breastfeeding? No   BMI 41.10 kg/m²      Labs Reviewed and Include    Recent Labs   Lab 01/18/20  0300     107  107   CALCIUM 8.3*  8.3*  8.3*   ALBUMIN 2.9*  2.9*   PROT 6.2     138  138   K 4.3  4.3  4.3   CO2 24  24  24     104  104   BUN 4*  4*  4*   CREATININE 0.7  0.7  0.7   ALKPHOS 124   *   * "   BILITOT 2.5*     Lab Results   Component Value Date    WBC 13.00 (H) 01/18/2020    HGB 8.3 (L) 01/18/2020    HCT 22 (L) 01/18/2020    MCV 94 01/18/2020     (L) 01/18/2020     No results for input(s): TSH, FREET4 in the last 168 hours.  Lab Results   Component Value Date    HGBA1C 9.7 (H) 01/03/2020       Nutritional status:   Body mass index is 41.1 kg/m².  Lab Results   Component Value Date    ALBUMIN 2.9 (L) 01/18/2020    ALBUMIN 2.9 (L) 01/18/2020    ALBUMIN 3.0 (L) 01/17/2020    ALBUMIN 3.0 (L) 01/17/2020     No results found for: PREALBUMIN    Estimated Creatinine Clearance: 112 mL/min (based on SCr of 0.7 mg/dL).    Accu-Checks  Recent Labs     01/16/20  1135 01/16/20  1536 01/16/20  1929 01/16/20  2356 01/17/20  0309 01/17/20  0908 01/17/20  1139 01/17/20  1629 01/17/20  2005 01/18/20  0312   POCTGLUCOSE 295* 310* 278* 215* 155* 148* 135* 109 113* 127*       Current Medications and/or Treatments Impacting Glycemic Control  Immunotherapy:    Immunosuppressants     None        Steroids:   Hormones (From admission, onward)    Start     Stop Route Frequency Ordered    01/14/20 1230  vasopressin (PITRESSIN) 0.2 Units/mL in dextrose 5 % 100 mL infusion      -- IV Continuous 01/14/20 1129        Pressors:    Autonomic Drugs (From admission, onward)    Start     Stop Route Frequency Ordered    01/17/20 0915  cisatracurium (NIMBEX) 200 mg in dextrose 5 % 100 mL infusion     Question Answer Comment   Bolus over 1 minute (in mg): 10    Begin at (in mcg/kg/min): 1    Titrate by: (in mcg/kg/min) 0.05    Titrate interval: (in minutes) 15    To maintain a train-of-four of (TOF_/4): 4/4    Maximum dose: (in mcg/kg/min) 3        -- IV Continuous 01/17/20 0913    01/16/20 1430  succinylcholine (ANECTINE) 20 mg/mL injection     Note to Pharmacy:  Created by cabinet override    01/17 0244   01/16/20 1430    01/14/20 1853  rocuronium 10 mg/mL injection     Note to Pharmacy:  Created by cabinet override    01/15 0659    "01/14/20 1853    01/14/20 1230  EPINEPHrine (ADRENALIN) 5 mg in sodium chloride 0.9% 250 mL infusion     Question Answer Comment   Titrate by: (in mcg/kg/min) 0.01    Titrate interval: (in minutes) 5    Titrate to maintain: (SBP or MAP or Cardiac Index) SBP    Maximum dose: (in mcg/kg/min) 2        -- IV Continuous 01/14/20 1129    01/14/20 1230  norepinephrine 4 mg in dextrose 5% 250 mL infusion (premix) (titrating)     Question Answer Comment   Titrate by: (in mcg/kg/min) 0.05    Titrate interval: (in minutes) 5    Titrate to maintain: (MAP or SBP) MAP    Greater than: (in mmHg) 65    Maximum dose: (in mcg/kg/min) 3        -- IV Continuous 01/14/20 1129        Hyperglycemia/Diabetes Medications:   Antihyperglycemics (From admission, onward)    Start     Stop Route Frequency Ordered    01/16/20 1030  insulin detemir U-100 pen 20 Units      -- SubQ Daily 01/16/20 0906    01/15/20 0946  insulin aspart U-100 pen 0-5 Units      -- SubQ Every 4 hours PRN 01/15/20 0847          ASSESSMENT and PLAN    * Acute coronary syndrome  Managed per primary team  Avoid hypoglycemia        Type 2 diabetes mellitus with hyperglycemia, with long-term current use of insulin  BG goal 140 - 180     Decrease Levemir to 16 units daily. Fasting BG below goal ranges. (20% dose decrease).   Low Dose SQ Insulin Correction Scale every 4 hours while NPO.   BG monitoring every 4 hours while NPO.     ** Please call Endocrine for any BG related issues **  ** Please notify Endocrine for any change and/or advance in diet**      Discharge Planning:     TBD. Please notify endocrinology prior to discharge. Patient will need adjustments to home insulin regimen.     Tentative:     Levemir 20 units HS.     Novolog 7 units TIDWM.     Low Dose SQ Insulin Correction Scale.    BG Monitoring AC/HS     PRN Insurance preferred diabetes testing supplies    PRN Ultra-Fine Junie Pen Needles 4mm x 32 G (5/32" x 0.23mm).     Patient request outpatient follow-up with " [facility] endo clinic for continued DM management.          ROSLYN (acute kidney injury)  Caution with insulin stacking        Mixed hyperlipidemia  Managed per primary team  Condition may cause insulin resistance         Essential hypertension  Managed per primary team  Condition may cause insulin resistance         Acute on chronic combined systolic (congestive) and diastolic (congestive) heart failure  Optimize glucose control and  avoid hypoglycemia          CAD (coronary artery disease)  Optimize glucose control and  avoid hypoglycemia            Mary Baptiste NP  Endocrinology  [facility]

## 2020-01-18 NOTE — PLAN OF CARE
Pt afebrile. O2 sats % on ventilator settings AC/VC 50% 6PEEP. HR NSR, MAP >60 maintained throughout the shift. Pt medically paralyzed on nimbex @ 1.5mcg/kg/min w/ TOF currently 2/4. Gtts: epi, nimbex, dobutamine, fentanyl, and propofol. BIS 30s-40s. NO @5ppm, plan for slow wean of NO Q6hrs until goal of 3ppm reached. Pt oliguric, CRRT running w/ UF @ 350 throughout the shift, tolerating well. Minimal output from chest tube. BG/labs closely monitored throughout the shift. ABGs monitored Q 4 hrs. CVPs 8, 8, 9. Plan to wean nimbex as tolerated tomorrow to unparalyze patient. Plan of care reviewed with pt spouse, all questions and concerns addressed. MD updated on current condition, vitals, labs, and gtts.  No new orders received, will continue to monitor. See flowsheet for full assessment/details.

## 2020-01-19 LAB
ALBUMIN SERPL BCP-MCNC: 2.5 G/DL (ref 3.5–5.2)
ALBUMIN SERPL BCP-MCNC: 2.6 G/DL (ref 3.5–5.2)
ALLENS TEST: ABNORMAL
ALLENS TEST: NORMAL
ALLENS TEST: NORMAL
ALP SERPL-CCNC: 126 U/L (ref 55–135)
ALT SERPL W/O P-5'-P-CCNC: 633 U/L (ref 10–44)
ANION GAP SERPL CALC-SCNC: 11 MMOL/L (ref 8–16)
ANION GAP SERPL CALC-SCNC: 8 MMOL/L (ref 8–16)
AST SERPL-CCNC: 234 U/L (ref 10–40)
BASOPHILS # BLD AUTO: 0.02 K/UL (ref 0–0.2)
BASOPHILS NFR BLD: 0.2 % (ref 0–1.9)
BILIRUB SERPL-MCNC: 2.7 MG/DL (ref 0.1–1)
BUN SERPL-MCNC: 2 MG/DL (ref 6–20)
BUN SERPL-MCNC: 3 MG/DL (ref 6–20)
CALCIUM SERPL-MCNC: 8.1 MG/DL (ref 8.7–10.5)
CALCIUM SERPL-MCNC: 8.5 MG/DL (ref 8.7–10.5)
CALCIUM SERPL-MCNC: 8.5 MG/DL (ref 8.7–10.5)
CALCIUM SERPL-MCNC: 8.7 MG/DL (ref 8.7–10.5)
CHLORIDE SERPL-SCNC: 106 MMOL/L (ref 95–110)
CHLORIDE SERPL-SCNC: 107 MMOL/L (ref 95–110)
CHLORIDE SERPL-SCNC: 108 MMOL/L (ref 95–110)
CHLORIDE SERPL-SCNC: 108 MMOL/L (ref 95–110)
CO2 SERPL-SCNC: 25 MMOL/L (ref 23–29)
CO2 SERPL-SCNC: 26 MMOL/L (ref 23–29)
CO2 SERPL-SCNC: 27 MMOL/L (ref 23–29)
CO2 SERPL-SCNC: 27 MMOL/L (ref 23–29)
CREAT SERPL-MCNC: 0.6 MG/DL (ref 0.5–1.4)
CREAT SERPL-MCNC: 0.7 MG/DL (ref 0.5–1.4)
DELSYS: NORMAL
DIFFERENTIAL METHOD: ABNORMAL
EOSINOPHIL # BLD AUTO: 0.2 K/UL (ref 0–0.5)
EOSINOPHIL NFR BLD: 1.8 % (ref 0–8)
ERYTHROCYTE [DISTWIDTH] IN BLOOD BY AUTOMATED COUNT: 17.5 % (ref 11.5–14.5)
ERYTHROCYTE [SEDIMENTATION RATE] IN BLOOD BY WESTERGREN METHOD: 25 MM/H
EST. GFR  (AFRICAN AMERICAN): >60 ML/MIN/1.73 M^2
EST. GFR  (NON AFRICAN AMERICAN): >60 ML/MIN/1.73 M^2
GLUCOSE SERPL-MCNC: 103 MG/DL (ref 70–110)
GLUCOSE SERPL-MCNC: 126 MG/DL (ref 70–110)
GLUCOSE SERPL-MCNC: 80 MG/DL (ref 70–110)
GLUCOSE SERPL-MCNC: 80 MG/DL (ref 70–110)
HCO3 UR-SCNC: 25.1 MMOL/L (ref 24–28)
HCO3 UR-SCNC: 25.2 MMOL/L (ref 24–28)
HCO3 UR-SCNC: 25.2 MMOL/L (ref 24–28)
HCO3 UR-SCNC: 25.4 MMOL/L (ref 24–28)
HCO3 UR-SCNC: 26.3 MMOL/L (ref 24–28)
HCT VFR BLD AUTO: 25.5 % (ref 37–48.5)
HCT VFR BLD CALC: 21 %PCV (ref 36–54)
HGB BLD-MCNC: 7.8 G/DL (ref 12–16)
IMM GRANULOCYTES # BLD AUTO: 0.07 K/UL (ref 0–0.04)
IMM GRANULOCYTES NFR BLD AUTO: 0.7 % (ref 0–0.5)
INR PPP: 1 (ref 0.8–1.2)
LDH SERPL L TO P-CCNC: 0.4 MMOL/L (ref 0.36–1.25)
LDH SERPL L TO P-CCNC: 0.43 MMOL/L (ref 0.36–1.25)
LDH SERPL L TO P-CCNC: 0.45 MMOL/L (ref 0.36–1.25)
LDH SERPL L TO P-CCNC: 0.49 MMOL/L (ref 0.36–1.25)
LDH SERPL L TO P-CCNC: 0.9 MMOL/L (ref 0.36–1.25)
LYMPHOCYTES # BLD AUTO: 1 K/UL (ref 1–4.8)
LYMPHOCYTES NFR BLD: 9.5 % (ref 18–48)
MAGNESIUM SERPL-MCNC: 1.9 MG/DL (ref 1.6–2.6)
MAGNESIUM SERPL-MCNC: 1.9 MG/DL (ref 1.6–2.6)
MAGNESIUM SERPL-MCNC: 2 MG/DL (ref 1.6–2.6)
MAGNESIUM SERPL-MCNC: 2 MG/DL (ref 1.6–2.6)
MAGNESIUM SERPL-MCNC: 2.1 MG/DL (ref 1.6–2.6)
MCH RBC QN AUTO: 29.4 PG (ref 27–31)
MCHC RBC AUTO-ENTMCNC: 30.6 G/DL (ref 32–36)
MCV RBC AUTO: 96 FL (ref 82–98)
METHEMOGLOBIN: 0.8 % (ref 0–3)
MODE: NORMAL
MONOCYTES # BLD AUTO: 1.1 K/UL (ref 0.3–1)
MONOCYTES NFR BLD: 10.5 % (ref 4–15)
NEUTROPHILS # BLD AUTO: 7.9 K/UL (ref 1.8–7.7)
NEUTROPHILS NFR BLD: 77.3 % (ref 38–73)
NRBC BLD-RTO: 0 /100 WBC
PCO2 BLDA: 35 MMHG (ref 35–45)
PCO2 BLDA: 35.7 MMHG (ref 35–45)
PCO2 BLDA: 38.8 MMHG (ref 35–45)
PCO2 BLDA: 40.1 MMHG (ref 35–45)
PCO2 BLDA: 44.5 MMHG (ref 35–45)
PEEP: 8
PH SMN: 7.36 [PH] (ref 7.35–7.45)
PH SMN: 7.41 [PH] (ref 7.35–7.45)
PH SMN: 7.44 [PH] (ref 7.35–7.45)
PH SMN: 7.46 [PH] (ref 7.35–7.45)
PH SMN: 7.47 [PH] (ref 7.35–7.45)
PHOSPHATE SERPL-MCNC: 1.3 MG/DL (ref 2.7–4.5)
PHOSPHATE SERPL-MCNC: 1.3 MG/DL (ref 2.7–4.5)
PHOSPHATE SERPL-MCNC: 2.5 MG/DL (ref 2.7–4.5)
PHOSPHATE SERPL-MCNC: 2.5 MG/DL (ref 2.7–4.5)
PHOSPHATE SERPL-MCNC: 3.7 MG/DL (ref 2.7–4.5)
PLATELET # BLD AUTO: 144 K/UL (ref 150–350)
PMV BLD AUTO: 11.1 FL (ref 9.2–12.9)
PO2 BLDA: 120 MMHG (ref 80–100)
PO2 BLDA: 130 MMHG (ref 80–100)
PO2 BLDA: 153 MMHG (ref 80–100)
PO2 BLDA: 159 MMHG (ref 80–100)
PO2 BLDA: 85 MMHG (ref 80–100)
POC BE: 0 MMOL/L
POC BE: 0 MMOL/L
POC BE: 1 MMOL/L
POC BE: 2 MMOL/L
POC BE: 2 MMOL/L
POC IONIZED CALCIUM: 1.1 MMOL/L (ref 1.06–1.42)
POC SATURATED O2: 96 % (ref 95–100)
POC SATURATED O2: 99 % (ref 95–100)
POC TCO2: 26 MMOL/L (ref 23–27)
POC TCO2: 27 MMOL/L (ref 23–27)
POCT GLUCOSE: 117 MG/DL (ref 70–110)
POCT GLUCOSE: 121 MG/DL (ref 70–110)
POCT GLUCOSE: 122 MG/DL (ref 70–110)
POCT GLUCOSE: 70 MG/DL (ref 70–110)
POCT GLUCOSE: 76 MG/DL (ref 70–110)
POCT GLUCOSE: 79 MG/DL (ref 70–110)
POCT GLUCOSE: 86 MG/DL (ref 70–110)
POCT GLUCOSE: 96 MG/DL (ref 70–110)
POTASSIUM BLD-SCNC: 4.3 MMOL/L (ref 3.5–5.1)
POTASSIUM SERPL-SCNC: 4.2 MMOL/L (ref 3.5–5.1)
POTASSIUM SERPL-SCNC: 4.2 MMOL/L (ref 3.5–5.1)
POTASSIUM SERPL-SCNC: 4.5 MMOL/L (ref 3.5–5.1)
POTASSIUM SERPL-SCNC: 4.8 MMOL/L (ref 3.5–5.1)
PROT SERPL-MCNC: 6 G/DL (ref 6–8.4)
PROTHROMBIN TIME: 10.5 SEC (ref 9–12.5)
RBC # BLD AUTO: 2.65 M/UL (ref 4–5.4)
SAMPLE: ABNORMAL
SAMPLE: NORMAL
SAMPLE: NORMAL
SITE: ABNORMAL
SITE: NORMAL
SITE: NORMAL
SODIUM BLD-SCNC: 141 MMOL/L (ref 136–145)
SODIUM SERPL-SCNC: 141 MMOL/L (ref 136–145)
SODIUM SERPL-SCNC: 142 MMOL/L (ref 136–145)
SODIUM SERPL-SCNC: 143 MMOL/L (ref 136–145)
SODIUM SERPL-SCNC: 143 MMOL/L (ref 136–145)
VT: 325
WBC # BLD AUTO: 10.17 K/UL (ref 3.9–12.7)

## 2020-01-19 PROCEDURE — C9113 INJ PANTOPRAZOLE SODIUM, VIA: HCPCS | Performed by: SURGERY

## 2020-01-19 PROCEDURE — 99900035 HC TECH TIME PER 15 MIN (STAT)

## 2020-01-19 PROCEDURE — 84132 ASSAY OF SERUM POTASSIUM: CPT

## 2020-01-19 PROCEDURE — 80100008 HC CRRT DAILY MAINTENANCE

## 2020-01-19 PROCEDURE — 99232 PR SUBSEQUENT HOSPITAL CARE,LEVL II: ICD-10-PCS | Mod: ,,, | Performed by: NURSE PRACTITIONER

## 2020-01-19 PROCEDURE — 83050 HGB METHEMOGLOBIN QUAN: CPT

## 2020-01-19 PROCEDURE — 83605 ASSAY OF LACTIC ACID: CPT

## 2020-01-19 PROCEDURE — 63600175 PHARM REV CODE 636 W HCPCS: Performed by: STUDENT IN AN ORGANIZED HEALTH CARE EDUCATION/TRAINING PROGRAM

## 2020-01-19 PROCEDURE — 80069 RENAL FUNCTION PANEL: CPT | Mod: 91

## 2020-01-19 PROCEDURE — 99233 PR SUBSEQUENT HOSPITAL CARE,LEVL III: ICD-10-PCS | Mod: ,,, | Performed by: SURGERY

## 2020-01-19 PROCEDURE — 84295 ASSAY OF SERUM SODIUM: CPT

## 2020-01-19 PROCEDURE — 63600175 PHARM REV CODE 636 W HCPCS: Performed by: SURGERY

## 2020-01-19 PROCEDURE — 99232 SBSQ HOSP IP/OBS MODERATE 35: CPT | Mod: ,,, | Performed by: NURSE PRACTITIONER

## 2020-01-19 PROCEDURE — 85014 HEMATOCRIT: CPT

## 2020-01-19 PROCEDURE — 25000003 PHARM REV CODE 250: Performed by: SURGERY

## 2020-01-19 PROCEDURE — 27000221 HC OXYGEN, UP TO 24 HOURS

## 2020-01-19 PROCEDURE — 90945 PR DIALYSIS, NOT HEMO, 1 EVAL: ICD-10-PCS | Mod: ,,, | Performed by: INTERNAL MEDICINE

## 2020-01-19 PROCEDURE — 80053 COMPREHEN METABOLIC PANEL: CPT

## 2020-01-19 PROCEDURE — 25000003 PHARM REV CODE 250: Performed by: STUDENT IN AN ORGANIZED HEALTH CARE EDUCATION/TRAINING PROGRAM

## 2020-01-19 PROCEDURE — 82803 BLOOD GASES ANY COMBINATION: CPT

## 2020-01-19 PROCEDURE — 90945 DIALYSIS ONE EVALUATION: CPT | Mod: ,,, | Performed by: INTERNAL MEDICINE

## 2020-01-19 PROCEDURE — 99900026 HC AIRWAY MAINTENANCE (STAT)

## 2020-01-19 PROCEDURE — 99233 SBSQ HOSP IP/OBS HIGH 50: CPT | Mod: ,,, | Performed by: SURGERY

## 2020-01-19 PROCEDURE — 85610 PROTHROMBIN TIME: CPT

## 2020-01-19 PROCEDURE — 37799 UNLISTED PX VASCULAR SURGERY: CPT

## 2020-01-19 PROCEDURE — 20000000 HC ICU ROOM

## 2020-01-19 PROCEDURE — 94003 VENT MGMT INPAT SUBQ DAY: CPT

## 2020-01-19 PROCEDURE — 82330 ASSAY OF CALCIUM: CPT

## 2020-01-19 PROCEDURE — 94761 N-INVAS EAR/PLS OXIMETRY MLT: CPT

## 2020-01-19 PROCEDURE — 85025 COMPLETE CBC W/AUTO DIFF WBC: CPT

## 2020-01-19 PROCEDURE — 63600367 HC NITRIC OXIDE PER HOUR

## 2020-01-19 PROCEDURE — 83735 ASSAY OF MAGNESIUM: CPT

## 2020-01-19 PROCEDURE — 83735 ASSAY OF MAGNESIUM: CPT | Mod: 91

## 2020-01-19 PROCEDURE — 80069 RENAL FUNCTION PANEL: CPT

## 2020-01-19 RX ORDER — MAGNESIUM SULFATE HEPTAHYDRATE 40 MG/ML
2 INJECTION, SOLUTION INTRAVENOUS
Status: DISCONTINUED | OUTPATIENT
Start: 2020-01-19 | End: 2020-01-20

## 2020-01-19 RX ORDER — DEXMEDETOMIDINE HYDROCHLORIDE 4 UG/ML
0.2 INJECTION, SOLUTION INTRAVENOUS CONTINUOUS
Status: DISCONTINUED | OUTPATIENT
Start: 2020-01-19 | End: 2020-01-21

## 2020-01-19 RX ORDER — HYDROCODONE BITARTRATE AND ACETAMINOPHEN 500; 5 MG/1; MG/1
TABLET ORAL CONTINUOUS
Status: DISCONTINUED | OUTPATIENT
Start: 2020-01-19 | End: 2020-01-20

## 2020-01-19 RX ADMIN — PROPOFOL 50 MCG/KG/MIN: 10 INJECTION, EMULSION INTRAVENOUS at 08:01

## 2020-01-19 RX ADMIN — POLYETHYLENE GLYCOL 3350 17 G: 17 POWDER, FOR SOLUTION ORAL at 08:01

## 2020-01-19 RX ADMIN — HEPARIN SODIUM 5000 UNITS: 5000 INJECTION, SOLUTION INTRAVENOUS; SUBCUTANEOUS at 05:01

## 2020-01-19 RX ADMIN — SODIUM PHOSPHATE, MONOBASIC, MONOHYDRATE 39.99 MMOL: 276; 142 INJECTION, SOLUTION INTRAVENOUS at 06:01

## 2020-01-19 RX ADMIN — SODIUM PHOSPHATE, MONOBASIC, MONOHYDRATE 30 MMOL: 276; 142 INJECTION, SOLUTION INTRAVENOUS at 05:01

## 2020-01-19 RX ADMIN — PROPOFOL 50 MCG/KG/MIN: 10 INJECTION, EMULSION INTRAVENOUS at 11:01

## 2020-01-19 RX ADMIN — CARBOXYMETHYLCELLULOSE SODIUM 1 DROP: 10 GEL OPHTHALMIC at 05:01

## 2020-01-19 RX ADMIN — DOBUTAMINE IN DEXTROSE 5 MCG/KG/MIN: 200 INJECTION, SOLUTION INTRAVENOUS at 03:01

## 2020-01-19 RX ADMIN — SODIUM CHLORIDE: 0.9 INJECTION, SOLUTION INTRAVENOUS at 03:01

## 2020-01-19 RX ADMIN — CARBOXYMETHYLCELLULOSE SODIUM 1 DROP: 10 GEL OPHTHALMIC at 02:01

## 2020-01-19 RX ADMIN — ASPIRIN 81 MG CHEWABLE TABLET 81 MG: 81 TABLET CHEWABLE at 08:01

## 2020-01-19 RX ADMIN — HEPARIN SODIUM 5000 UNITS: 5000 INJECTION, SOLUTION INTRAVENOUS; SUBCUTANEOUS at 02:01

## 2020-01-19 RX ADMIN — MUPIROCIN 1 G: 20 OINTMENT TOPICAL at 08:01

## 2020-01-19 RX ADMIN — CLOPIDOGREL BISULFATE 75 MG: 75 TABLET, FILM COATED ORAL at 08:01

## 2020-01-19 RX ADMIN — ATORVASTATIN CALCIUM 80 MG: 20 TABLET, FILM COATED ORAL at 09:01

## 2020-01-19 RX ADMIN — PANTOPRAZOLE SODIUM 40 MG: 40 INJECTION, POWDER, FOR SOLUTION INTRAVENOUS at 09:01

## 2020-01-19 RX ADMIN — MAGNESIUM SULFATE IN WATER 2 G: 40 INJECTION, SOLUTION INTRAVENOUS at 04:01

## 2020-01-19 RX ADMIN — HEPARIN SODIUM 5000 UNITS: 5000 INJECTION, SOLUTION INTRAVENOUS; SUBCUTANEOUS at 09:01

## 2020-01-19 NOTE — SUBJECTIVE & OBJECTIVE
"Interval HPI:   Overnight events: Remains intubated in SICU. BG at or slightly below goal ranges on current SQ insulin regimen.   Eating:   NPO  Nausea: No  Hypoglycemia and intervention: No  Fever: No  TPN and/or TF: No  If yes, type of TF/TPN and rate: none    /64   Pulse 71   Temp 97.7 °F (36.5 °C) (Oral)   Resp (!) 24   Ht 5' 4" (1.626 m)   Wt 107.3 kg (236 lb 8.9 oz)   LMP 09/30/2015 (Exact Date)   SpO2 98%   Breastfeeding? No   BMI 40.60 kg/m²     Labs Reviewed and Include    Recent Labs   Lab 01/19/20  0300     103  103  103  103   CALCIUM 8.1*  8.1*  8.1*  8.1*  8.1*   ALBUMIN 2.5*  2.5*  2.5*   PROT 6.0     141  141  141  141   K 4.5  4.5  4.5  4.5  4.5   CO2 26  26  26  26  26     107  107  107  107   BUN 3*  3*  3*  3*  3*   CREATININE 0.6  0.6  0.6  0.6  0.6   ALKPHOS 126   *   *   BILITOT 2.7*     Lab Results   Component Value Date    WBC 10.17 01/19/2020    HGB 7.8 (L) 01/19/2020    HCT 21 (L) 01/19/2020    MCV 96 01/19/2020     (L) 01/19/2020     No results for input(s): TSH, FREET4 in the last 168 hours.  Lab Results   Component Value Date    HGBA1C 9.7 (H) 01/03/2020       Nutritional status:   Body mass index is 40.6 kg/m².  Lab Results   Component Value Date    ALBUMIN 2.5 (L) 01/19/2020    ALBUMIN 2.5 (L) 01/19/2020    ALBUMIN 2.5 (L) 01/19/2020     No results found for: PREALBUMIN    Estimated Creatinine Clearance: 129.6 mL/min (based on SCr of 0.6 mg/dL).    Accu-Checks  Recent Labs     01/17/20  2005 01/18/20  0312 01/18/20  0736 01/18/20  1136 01/18/20  1329 01/18/20  1523 01/18/20  2156 01/19/20  0314 01/19/20  0751 01/19/20  1109   POCTGLUCOSE 113* 127* 91 97 93 93 136* 117* 122* 86       Current Medications and/or Treatments Impacting Glycemic Control  Immunotherapy:    Immunosuppressants     None        Steroids:   Hormones (From admission, onward)    Start     Stop Route Frequency Ordered    01/14/20 " 1230  vasopressin (PITRESSIN) 0.2 Units/mL in dextrose 5 % 100 mL infusion      -- IV Continuous 01/14/20 1129        Pressors:    Autonomic Drugs (From admission, onward)    Start     Stop Route Frequency Ordered    01/17/20 0915  cisatracurium (NIMBEX) 200 mg in dextrose 5 % 100 mL infusion     Question Answer Comment   Bolus over 1 minute (in mg): 10    Begin at (in mcg/kg/min): 1    Titrate by: (in mcg/kg/min) 0.05    Titrate interval: (in minutes) 15    To maintain a train-of-four of (TOF_/4): 4/4    Maximum dose: (in mcg/kg/min) 3        -- IV Continuous 01/17/20 0913    01/16/20 1430  succinylcholine (ANECTINE) 20 mg/mL injection     Note to Pharmacy:  Created by cabinet override    01/17 0244   01/16/20 1430    01/14/20 1853  rocuronium 10 mg/mL injection     Note to Pharmacy:  Created by cabinet override    01/15 0659   01/14/20 1853    01/14/20 1230  EPINEPHrine (ADRENALIN) 5 mg in sodium chloride 0.9% 250 mL infusion     Question Answer Comment   Titrate by: (in mcg/kg/min) 0.01    Titrate interval: (in minutes) 5    Titrate to maintain: (SBP or MAP or Cardiac Index) SBP    Maximum dose: (in mcg/kg/min) 2        -- IV Continuous 01/14/20 1129    01/14/20 1230  norepinephrine 4 mg in dextrose 5% 250 mL infusion (premix) (titrating)     Question Answer Comment   Titrate by: (in mcg/kg/min) 0.05    Titrate interval: (in minutes) 5    Titrate to maintain: (MAP or SBP) MAP    Greater than: (in mmHg) 65    Maximum dose: (in mcg/kg/min) 3        -- IV Continuous 01/14/20 1129        Hyperglycemia/Diabetes Medications:   Antihyperglycemics (From admission, onward)    Start     Stop Route Frequency Ordered    01/20/20 0900  insulin detemir U-100 pen 13 Units      -- SubQ Daily 01/19/20 1306    01/15/20 0946  insulin aspart U-100 pen 0-5 Units      -- SubQ Every 4 hours PRN 01/15/20 0850

## 2020-01-19 NOTE — ASSESSMENT & PLAN NOTE
"BG goal 140 - 180     Decrease Levemir to 13 units daily. Fasting BG below goal ranges. (20% dose decrease).   Low Dose SQ Insulin Correction Scale every 4 hours while NPO.   BG monitoring every 4 hours while NPO.     ** Please call Endocrine for any BG related issues **  ** Please notify Endocrine for any change and/or advance in diet**      Discharge Planning:     TBD. Please notify endocrinology prior to discharge. Patient will need adjustments to home insulin regimen.     Tentative:     Levemir 16 units HS.     Novolog 7 units TIDWM.     Low Dose SQ Insulin Correction Scale.    BG Monitoring AC/HS     PRN Insurance preferred diabetes testing supplies    PRN Ultra-Fine Junie Pen Needles 4mm x 32 G (5/32" x 0.23mm).     Patient request outpatient follow-up with [facility] endo clinic for continued DM management.        "

## 2020-01-19 NOTE — PLAN OF CARE
Pt remains intubated, sedated, and paralyzed with O2 sats % on ventilator settings AC/VC+ @ 50% FiO2, 6PEEP. HR NSR, MAP >60 maintained throughout the shift. Epi gtt weaned off this morning. Pt medically paralyzed on nimbex @ 1.5mcg/kg/min w/ TOF currently 2/4. Gtts: nimbex, dobutamine, fentanyl, and propofol. BIS 30s-40s. NO @1ppm, plan to turn off NO this afternoon. Pt oliguric, CRRT running w/ UF @ 350 throughout the shift, tolerating well. Minimal output from chest tube. BG/labs closely monitored throughout the shift. ABGs monitored Q 4 hrs. CVPs 9, 9, 9. Plan to unparalyze patient this afternoon w/ Dr. Altamirano at bedside. Plan of care reviewed with pt spouse, all questions and concerns addressed. MD updated on current condition, vitals, labs, and gtts.  No new orders received, will continue to monitor. See flowsheet for full assessment/details.

## 2020-01-19 NOTE — PROGRESS NOTES
Dr. Houston notified of patients rhythm going into trigeminy and then normal sinus rhythm. All electrolytes within normal limits at this time. Orders received to continue to watch patient closely and notify if ectopy becomes more frequent.

## 2020-01-19 NOTE — SUBJECTIVE & OBJECTIVE
Interval History/Significant Events: No acute events overnight. Stable on CRRT with UF @ 350. Still paralyzed with nimbex @ 1.5. Dobutamine @ 5, epi @ 0.02, Fent @ 75. Minimal to no CT output. Last CVP 7.    Follow-up For: Procedure(s) (LRB):  CORONARY ARTERY BYPASS GRAFT (CABG) x 1     Off Pump (N/A)    Post-Operative Day: 5 Days Post-Op    Objective:     Vital Signs (Most Recent):  Temp: 97.6 °F (36.4 °C) (01/19/20 1500)  Pulse: 70 (01/19/20 1830)  Resp: (!) 24 (01/17/20 1930)  BP: 124/64 (01/19/20 1800)  SpO2: 98 % (01/19/20 1830) Vital Signs (24h Range):  Temp:  [97.6 °F (36.4 °C)-98.3 °F (36.8 °C)] 97.6 °F (36.4 °C)  Pulse:  [67-78] 70  SpO2:  [90 %-100 %] 98 %  BP: (115-174)/(60-81) 124/64  Arterial Line BP: ()/() 96/47     Weight: 107.3 kg (236 lb 8.9 oz)  Body mass index is 40.6 kg/m².      Intake/Output Summary (Last 24 hours) at 1/19/2020 1845  Last data filed at 1/19/2020 1800  Gross per 24 hour   Intake 6587.09 ml   Output 8456 ml   Net -1868.91 ml       Physical Exam   Constitutional: She appears well-developed and well-nourished. She is sedated and intubated.   HENT:   Head: Normocephalic and atraumatic.   Eyes: Pupils are equal, round, and reactive to light.   Neck: Normal range of motion. Neck supple.   Bilateral CVC, dressings CDI   Cardiovascular: Normal rate and regular rhythm.   Pulmonary/Chest: She is intubated.   Mechanically ventilated   Abdominal: Soft. She exhibits no distension. There is no rebound.   Musculoskeletal: Normal range of motion. She exhibits edema. She exhibits no deformity.   Neurological:   sedated   Skin: Skin is warm and dry. No rash noted. No erythema.       Vents:  Vent Mode: A/C (01/19/20 1725)  Ventilator Initiated: Yes (01/16/20 1448)  Set Rate: 24 bmp (01/19/20 1725)  Vt Set: 325 mL (01/19/20 1725)  PEEP/CPAP: 6 cmH20 (01/19/20 1725)  Oxygen Concentration (%): 40 (01/19/20 1830)  Peak Airway Pressure: 21 cmH2O (01/19/20 1725)  Plateau Pressure: 17 cmH20  (01/19/20 1725)  Total Ve: 7.78 mL (01/19/20 1725)  F/VT Ratio<105 (RSBI): 138.12 (01/17/20 1823)    Lines/Drains/Airways     Central Venous Catheter Line                 Introducer right internal jugular -- days         Percutaneous Central Line Insertion/Assessment - triple lumen  right internal jugular -- days         Trialysis (Dialysis) Catheter left internal jugular -- days         Percutaneous Central Line Insertion/Assessment - double lumen  01/14/20 0725 5 days          Drain                 Chest Tube 01/14/20 1017 1 Left Pleural 19 Fr. 5 days         Urethral Catheter 01/14/20 0720 Non-latex;Temperature probe;Straight-tip 14 Fr. 5 days         Y Chest Tube 1 and 2 01/14/20 1018 1 Anterior Mediastinal 19 Fr. 2 Anterior Mediastinal 19 Fr. 5 days         NG/OG Tube 01/16/20 1446 orogastric 3 days          Airway                 Airway - Non-Surgical 01/16/20 1442 Endotracheal Tube 3 days          Arterial Line                 Arterial Line 01/16/20 2300 Right Radial 2 days          Line                 Pacer Wires 01/14/20 1009 5 days                Significant Labs:    CBC/Anemia Profile:  Recent Labs   Lab 01/18/20  0910 01/18/20  1525  01/19/20  0115 01/19/20  0300 01/19/20  0358   WBC 12.37 12.24  --   --  10.17  --    HGB 8.1* 8.3*  --   --  7.8*  --    HCT 26.1* 26.5*   < > 22* 25.5* 21*   * 144*  --   --  144*  --    MCV 96 95  --   --  96  --    RDW 17.4* 17.3*  --   --  17.5*  --     < > = values in this interval not displayed.        Chemistries:  Recent Labs   Lab 01/18/20  1525 01/18/20  2200 01/19/20  0300 01/19/20  1408    141  141  141 141  141  141  141  141 143  143   K 4.5 4.6  4.6  4.6 4.5  4.5  4.5  4.5  4.5 4.2  4.2    106  106  106 107  107  107  107  107 108  108   CO2 25 25  25  25 26  26  26  26  26 27  27   BUN 3* 3*  3*  3* 3*  3*  3*  3*  3* 2*  2*   CREATININE 0.7 0.7  0.7  0.7 0.6  0.6  0.6  0.6  0.6 0.6  0.6   CALCIUM  8.5* 8.2*  8.2*  8.2* 8.1*  8.1*  8.1*  8.1*  8.1* 8.5*  8.5*   ALBUMIN 2.9* 2.7*  2.7*  2.7* 2.5*  2.5*  2.5* 2.5*  2.5*   PROT 6.4 6.2 6.0  --    BILITOT 2.9* 2.9* 2.7*  --    ALKPHOS 179* 130 126  --    * 685* 633*  --    * 265* 234*  --    MG  --  2.1  2.1 2.0  2.0 1.9  1.9   PHOS  --  4.3  4.3 2.5*  2.5* 1.3*  1.3*       ABGs:   Recent Labs   Lab 01/19/20  1533   PH 7.470*   PCO2 35.0   HCO3 25.4   POCSATURATED 99   BE 2     Coagulation:   Recent Labs   Lab 01/19/20  0300   INR 1.0     Lactic Acid: No results for input(s): LACTATE in the last 48 hours.    Significant Imaging:  I have reviewed all pertinent imaging results/findings within the past 24 hours.

## 2020-01-19 NOTE — PLAN OF CARE
Problem: Adult Inpatient Plan of Care  Goal: Plan of Care Review  1/18/2020 0623 by Tabatha Villanueva RN  Outcome: Ongoing, Progressing  Pt remains intubated, sedated, and paralyzed with settings A/C 50% FiO2 7 peep 325 Vt, with 3 ppm of nitric. Gtts: Nimbex 1.5 mcg/kg/min with TOF 2/4, dobutamine at 5 mcg/kg/min, propofol at 45 mcg/kg/min with BIS 30's-40's, fentanyl 75 mcg/hr, epi at 0.0mcg/kg/min. Pt tolerating CRRT with . CVP 8, 7, 7. Pt made 20cc of concentrated yellow urine. No output from chest tubes. See flowsheets for assessments and intake and output. Plan of care reviewed with patients spouse. Will continue to monitor.

## 2020-01-19 NOTE — PROGRESS NOTES
Cardiothoracic Surgery  Progress Note      Admit Date: 1/2/2020    Disease Etiology: Ischemic  Open Chest: no  Surgery Performed: 1v OPCAB 1/14/2020    ASSESSMENT/PLAN:     I conducted multidisciplinary rounds in conjunction with the ICU/Critical Care attending staff and/or residents, with involvement of ancillary services as appropriate. The patient's general condition was reviewed, specifically including hemodynamic performance, dietary and nutritional status, pharmacy concerns, and status of expected transition to stepdown care. Plan has been discussed and agreed upon.     Wean epi to off, attempt to wean nimbex off by midnight as tolerated, wean Gracia to off by 1600, q4 ABGs, wean Fi/PEEP as able.     For details see the critical care note.    Rosio Flood MD  Cardiac Surgery Resident, PGY7  Cardiothoracic Surgery  [facility]

## 2020-01-19 NOTE — PROGRESS NOTES
Nephrology Progress Note    Patient followed for ROSLYN  Patient was seen on RRT / SLED which was running for uremic toxin clearance / UF for volume management.  Wt Readings from Last 3 Encounters:   01/19/20 107.3 kg (236 lb 8.9 oz)   12/18/19 98 kg (216 lb)   12/13/19 98 kg (216 lb)     Temp Readings from Last 3 Encounters:   01/19/20 97.7 °F (36.5 °C) (Oral)   12/18/19 97.8 °F (36.6 °C)   12/09/19 97.8 °F (36.6 °C) (Oral)     BP Readings from Last 3 Encounters:   01/19/20 129/67   12/18/19 (!) 141/83   12/13/19 118/72     Pulse Readings from Last 3 Encounters:   01/19/20 71   12/18/19 74   12/13/19 72       CMP  Sodium   Date Value Ref Range Status   01/19/2020 141 136 - 145 mmol/L Final   01/19/2020 141 136 - 145 mmol/L Final   01/19/2020 141 136 - 145 mmol/L Final   01/19/2020 141 136 - 145 mmol/L Final   01/19/2020 141 136 - 145 mmol/L Final     Potassium   Date Value Ref Range Status   01/19/2020 4.5 3.5 - 5.1 mmol/L Final   01/19/2020 4.5 3.5 - 5.1 mmol/L Final   01/19/2020 4.5 3.5 - 5.1 mmol/L Final   01/19/2020 4.5 3.5 - 5.1 mmol/L Final   01/19/2020 4.5 3.5 - 5.1 mmol/L Final     Chloride   Date Value Ref Range Status   01/19/2020 107 95 - 110 mmol/L Final   01/19/2020 107 95 - 110 mmol/L Final   01/19/2020 107 95 - 110 mmol/L Final   01/19/2020 107 95 - 110 mmol/L Final   01/19/2020 107 95 - 110 mmol/L Final     CO2   Date Value Ref Range Status   01/19/2020 26 23 - 29 mmol/L Final   01/19/2020 26 23 - 29 mmol/L Final   01/19/2020 26 23 - 29 mmol/L Final   01/19/2020 26 23 - 29 mmol/L Final   01/19/2020 26 23 - 29 mmol/L Final     Glucose   Date Value Ref Range Status   01/19/2020 103 70 - 110 mg/dL Final   01/19/2020 103 70 - 110 mg/dL Final   01/19/2020 103 70 - 110 mg/dL Final   01/19/2020 103 70 - 110 mg/dL Final   01/19/2020 103 70 - 110 mg/dL Final     BUN, Bld   Date Value Ref Range Status   01/19/2020 3 (L) 6 - 20 mg/dL Final   01/19/2020 3 (L) 6 - 20 mg/dL Final   01/19/2020 3 (L) 6 - 20 mg/dL  Final   01/19/2020 3 (L) 6 - 20 mg/dL Final   01/19/2020 3 (L) 6 - 20 mg/dL Final     Creatinine   Date Value Ref Range Status   01/19/2020 0.6 0.5 - 1.4 mg/dL Final   01/19/2020 0.6 0.5 - 1.4 mg/dL Final   01/19/2020 0.6 0.5 - 1.4 mg/dL Final   01/19/2020 0.6 0.5 - 1.4 mg/dL Final   01/19/2020 0.6 0.5 - 1.4 mg/dL Final     Calcium   Date Value Ref Range Status   01/19/2020 8.1 (L) 8.7 - 10.5 mg/dL Final   01/19/2020 8.1 (L) 8.7 - 10.5 mg/dL Final   01/19/2020 8.1 (L) 8.7 - 10.5 mg/dL Final   01/19/2020 8.1 (L) 8.7 - 10.5 mg/dL Final   01/19/2020 8.1 (L) 8.7 - 10.5 mg/dL Final     Total Protein   Date Value Ref Range Status   01/19/2020 6.0 6.0 - 8.4 g/dL Final     Albumin   Date Value Ref Range Status   01/19/2020 2.5 (L) 3.5 - 5.2 g/dL Final   01/19/2020 2.5 (L) 3.5 - 5.2 g/dL Final   01/19/2020 2.5 (L) 3.5 - 5.2 g/dL Final     Total Bilirubin   Date Value Ref Range Status   01/19/2020 2.7 (H) 0.1 - 1.0 mg/dL Final     Comment:     For infants and newborns, interpretation of results should be based  on gestational age, weight and in agreement with clinical  observations.  Premature Infant recommended reference ranges:  Up to 24 hours.............<8.0 mg/dL  Up to 48 hours............<12.0 mg/dL  3-5 days..................<15.0 mg/dL  6-29 days.................<15.0 mg/dL       Alkaline Phosphatase   Date Value Ref Range Status   01/19/2020 126 55 - 135 U/L Final     AST   Date Value Ref Range Status   01/19/2020 234 (H) 10 - 40 U/L Final     ALT   Date Value Ref Range Status   01/19/2020 633 (H) 10 - 44 U/L Final     Anion Gap   Date Value Ref Range Status   01/19/2020 8 8 - 16 mmol/L Final   01/19/2020 8 8 - 16 mmol/L Final   01/19/2020 8 8 - 16 mmol/L Final   01/19/2020 8 8 - 16 mmol/L Final   01/19/2020 8 8 - 16 mmol/L Final     eGFR if    Date Value Ref Range Status   01/19/2020 >60.0 >60 mL/min/1.73 m^2 Final   01/19/2020 >60.0 >60 mL/min/1.73 m^2 Final   01/19/2020 >60.0 >60 mL/min/1.73 m^2  Final   01/19/2020 >60.0 >60 mL/min/1.73 m^2 Final   01/19/2020 >60.0 >60 mL/min/1.73 m^2 Final     eGFR if non    Date Value Ref Range Status   01/19/2020 >60.0 >60 mL/min/1.73 m^2 Final     Comment:     Calculation used to obtain the estimated glomerular filtration  rate (eGFR) is the CKD-EPI equation.      01/19/2020 >60.0 >60 mL/min/1.73 m^2 Final     Comment:     Calculation used to obtain the estimated glomerular filtration  rate (eGFR) is the CKD-EPI equation.      01/19/2020 >60.0 >60 mL/min/1.73 m^2 Final     Comment:     Calculation used to obtain the estimated glomerular filtration  rate (eGFR) is the CKD-EPI equation.      01/19/2020 >60.0 >60 mL/min/1.73 m^2 Final     Comment:     Calculation used to obtain the estimated glomerular filtration  rate (eGFR) is the CKD-EPI equation.      01/19/2020 >60.0 >60 mL/min/1.73 m^2 Final     Comment:     Calculation used to obtain the estimated glomerular filtration  rate (eGFR) is the CKD-EPI equation.          CBC  Lab Results   Component Value Date    WBC 10.17 01/19/2020    HGB 7.8 (L) 01/19/2020    HCT 21 (L) 01/19/2020    MCV 96 01/19/2020     (L) 01/19/2020         Intake/Output Summary (Last 24 hours) at 1/19/2020 1412  Last data filed at 1/19/2020 1400  Gross per 24 hour   Intake 6324.09 ml   Output 7902 ml   Net -1577.91 ml         -Patient hemodynamically not stable for intermittent HD yet.  -Will continue RRT /SLED for volume and toxin clearance. SLED prescription and dialysate baths were   reviewed and changes made according to most recent labs.   --350 cc/hr as tolerated.    -Follow labs serially while on RRT/SLED to monitor electrolytes and follow replacement protocol as needed.         Winston Hampton MD  Nephrology Fellow  [facility]

## 2020-01-19 NOTE — PROGRESS NOTES
"[facility]  Endocrinology  Progress Note    Admit Date: 1/2/2020     Reason for Consult: Management of T2DM, Hyperglycemia     Surgical Procedure and Date: n/a    Diabetes diagnosis year:  2006    Home Diabetes Medications:  Lantus 50 units daily and novolog 9 units with meals   Lab Results   Component Value Date    HGBA1C 9.7 (H) 01/03/2020         How often checking glucose at home? 1-3 x day   BG readings on regimen: 100-200 typically; sometimes in 300s   Hypoglycemia on the regimen?  Yes occasionally - usually overnight   Missed doses on regimen?  Yes    Diabetes Complications include:     Hyperglycemia, Hypoglycemia  and Diabetic peripheral neuropathy     Complicating diabetes co morbidities:   History of MI and CHF      HPI:   Patient is a 54 y.o. female with a diagnosis of uncontrolled type 2 DM. Also with CHF, PAD, HTN, HLD, and CAD. Patient presented with chest pain and diaphoresis. Also with nauseated, diaphoretic, a mild headache, epigastric pain, with a vague "heart burn" like feeling in her chest. Patient admitted for treatment and further workup. Endocrinology consulted for BG/ DM management.             Interval HPI:   Overnight events: Remains intubated in SICU. BG at or slightly below goal ranges on current SQ insulin regimen.   Eating:   NPO  Nausea: No  Hypoglycemia and intervention: No  Fever: No  TPN and/or TF: No  If yes, type of TF/TPN and rate: none    /64   Pulse 71   Temp 97.7 °F (36.5 °C) (Oral)   Resp (!) 24   Ht 5' 4" (1.626 m)   Wt 107.3 kg (236 lb 8.9 oz)   LMP 09/30/2015 (Exact Date)   SpO2 98%   Breastfeeding? No   BMI 40.60 kg/m²      Labs Reviewed and Include    Recent Labs   Lab 01/19/20  0300     103  103  103  103   CALCIUM 8.1*  8.1*  8.1*  8.1*  8.1*   ALBUMIN 2.5*  2.5*  2.5*   PROT 6.0     141  141  141  141   K 4.5  4.5  4.5  4.5  4.5   CO2 26  26  26  26  26     107  107  107  107   BUN 3*  3* "  3*  3*  3*   CREATININE 0.6  0.6  0.6  0.6  0.6   ALKPHOS 126   *   *   BILITOT 2.7*     Lab Results   Component Value Date    WBC 10.17 01/19/2020    HGB 7.8 (L) 01/19/2020    HCT 21 (L) 01/19/2020    MCV 96 01/19/2020     (L) 01/19/2020     No results for input(s): TSH, FREET4 in the last 168 hours.  Lab Results   Component Value Date    HGBA1C 9.7 (H) 01/03/2020       Nutritional status:   Body mass index is 40.6 kg/m².  Lab Results   Component Value Date    ALBUMIN 2.5 (L) 01/19/2020    ALBUMIN 2.5 (L) 01/19/2020    ALBUMIN 2.5 (L) 01/19/2020     No results found for: PREALBUMIN    Estimated Creatinine Clearance: 129.6 mL/min (based on SCr of 0.6 mg/dL).    Accu-Checks  Recent Labs     01/17/20  2005 01/18/20  0312 01/18/20  0736 01/18/20  1136 01/18/20  1329 01/18/20  1523 01/18/20  2156 01/19/20  0314 01/19/20  0751 01/19/20  1109   POCTGLUCOSE 113* 127* 91 97 93 93 136* 117* 122* 86       Current Medications and/or Treatments Impacting Glycemic Control  Immunotherapy:    Immunosuppressants     None        Steroids:   Hormones (From admission, onward)    Start     Stop Route Frequency Ordered    01/14/20 1230  vasopressin (PITRESSIN) 0.2 Units/mL in dextrose 5 % 100 mL infusion      -- IV Continuous 01/14/20 1129        Pressors:    Autonomic Drugs (From admission, onward)    Start     Stop Route Frequency Ordered    01/17/20 0915  cisatracurium (NIMBEX) 200 mg in dextrose 5 % 100 mL infusion     Question Answer Comment   Bolus over 1 minute (in mg): 10    Begin at (in mcg/kg/min): 1    Titrate by: (in mcg/kg/min) 0.05    Titrate interval: (in minutes) 15    To maintain a train-of-four of (TOF_/4): 4/4    Maximum dose: (in mcg/kg/min) 3        -- IV Continuous 01/17/20 0913    01/16/20 1430  succinylcholine (ANECTINE) 20 mg/mL injection     Note to Pharmacy:  Created by cabinet override    01/17 0244   01/16/20 1430    01/14/20 1853  rocuronium 10 mg/mL injection     Note to  "Pharmacy:  Created by cabinet override    01/15 0659   01/14/20 1853    01/14/20 1230  EPINEPHrine (ADRENALIN) 5 mg in sodium chloride 0.9% 250 mL infusion     Question Answer Comment   Titrate by: (in mcg/kg/min) 0.01    Titrate interval: (in minutes) 5    Titrate to maintain: (SBP or MAP or Cardiac Index) SBP    Maximum dose: (in mcg/kg/min) 2        -- IV Continuous 01/14/20 1129    01/14/20 1230  norepinephrine 4 mg in dextrose 5% 250 mL infusion (premix) (titrating)     Question Answer Comment   Titrate by: (in mcg/kg/min) 0.05    Titrate interval: (in minutes) 5    Titrate to maintain: (MAP or SBP) MAP    Greater than: (in mmHg) 65    Maximum dose: (in mcg/kg/min) 3        -- IV Continuous 01/14/20 1129        Hyperglycemia/Diabetes Medications:   Antihyperglycemics (From admission, onward)    Start     Stop Route Frequency Ordered    01/20/20 0900  insulin detemir U-100 pen 13 Units      -- SubQ Daily 01/19/20 1306    01/15/20 0946  insulin aspart U-100 pen 0-5 Units      -- SubQ Every 4 hours PRN 01/15/20 0847          ASSESSMENT and PLAN    * Acute coronary syndrome  Managed per primary team  Avoid hypoglycemia        Type 2 diabetes mellitus with hyperglycemia, with long-term current use of insulin  BG goal 140 - 180     Decrease Levemir to 13 units daily. Fasting BG below goal ranges. (20% dose decrease).   Low Dose SQ Insulin Correction Scale every 4 hours while NPO.   BG monitoring every 4 hours while NPO.     ** Please call Endocrine for any BG related issues **  ** Please notify Endocrine for any change and/or advance in diet**      Discharge Planning:     TBD. Please notify endocrinology prior to discharge. Patient will need adjustments to home insulin regimen.     Tentative:     Levemir 16 units HS.     Novolog 7 units TIDWM.     Low Dose SQ Insulin Correction Scale.    BG Monitoring AC/HS     PRN Insurance preferred diabetes testing supplies    PRN Ultra-Fine Junie Pen Needles 4mm x 32 G (5/32" x " 0.23mm).     Patient request outpatient follow-up [facility] for continued DM management.          ROSLYN (acute kidney injury)  Caution with insulin stacking        Mixed hyperlipidemia  Managed per primary team  Condition may cause insulin resistance         Essential hypertension  Managed per primary team  Condition may cause insulin resistance         Acute on chronic combined systolic (congestive) and diastolic (congestive) heart failure  Optimize glucose control and  avoid hypoglycemia          CAD (coronary artery disease)  Optimize glucose control and  avoid hypoglycemia            Mary Baptiste NP  Endocrinology  [facility]

## 2020-01-20 LAB
ABO + RH BLD: NORMAL
ALBUMIN SERPL BCP-MCNC: 2.4 G/DL (ref 3.5–5.2)
ALBUMIN SERPL BCP-MCNC: 2.5 G/DL (ref 3.5–5.2)
ALBUMIN SERPL BCP-MCNC: 2.6 G/DL (ref 3.5–5.2)
ALBUMIN SERPL BCP-MCNC: 2.6 G/DL (ref 3.5–5.2)
ALLENS TEST: ABNORMAL
ALLENS TEST: NORMAL
ALLENS TEST: NORMAL
ALP SERPL-CCNC: 163 U/L (ref 55–135)
ALT SERPL W/O P-5'-P-CCNC: 547 U/L (ref 10–44)
ANION GAP SERPL CALC-SCNC: 7 MMOL/L (ref 8–16)
ANION GAP SERPL CALC-SCNC: 9 MMOL/L (ref 8–16)
AST SERPL-CCNC: 189 U/L (ref 10–40)
BASOPHILS # BLD AUTO: 0.04 K/UL (ref 0–0.2)
BASOPHILS NFR BLD: 0.4 % (ref 0–1.9)
BILIRUB SERPL-MCNC: 2.7 MG/DL (ref 0.1–1)
BLD GP AB SCN CELLS X3 SERPL QL: NORMAL
BUN SERPL-MCNC: 2 MG/DL (ref 6–20)
BUN SERPL-MCNC: 2 MG/DL (ref 6–20)
BUN SERPL-MCNC: 3 MG/DL (ref 6–20)
BUN SERPL-MCNC: 3 MG/DL (ref 6–20)
CALCIUM SERPL-MCNC: 8.4 MG/DL (ref 8.7–10.5)
CALCIUM SERPL-MCNC: 8.7 MG/DL (ref 8.7–10.5)
CHLORIDE SERPL-SCNC: 105 MMOL/L (ref 95–110)
CHLORIDE SERPL-SCNC: 105 MMOL/L (ref 95–110)
CHLORIDE SERPL-SCNC: 106 MMOL/L (ref 95–110)
CHLORIDE SERPL-SCNC: 106 MMOL/L (ref 95–110)
CO2 SERPL-SCNC: 26 MMOL/L (ref 23–29)
CO2 SERPL-SCNC: 26 MMOL/L (ref 23–29)
CO2 SERPL-SCNC: 27 MMOL/L (ref 23–29)
CO2 SERPL-SCNC: 27 MMOL/L (ref 23–29)
CREAT SERPL-MCNC: 0.7 MG/DL (ref 0.5–1.4)
DELSYS: ABNORMAL
DELSYS: NORMAL
DELSYS: NORMAL
DIFFERENTIAL METHOD: ABNORMAL
EOSINOPHIL # BLD AUTO: 0.1 K/UL (ref 0–0.5)
EOSINOPHIL NFR BLD: 1 % (ref 0–8)
ERYTHROCYTE [DISTWIDTH] IN BLOOD BY AUTOMATED COUNT: 17.4 % (ref 11.5–14.5)
ERYTHROCYTE [SEDIMENTATION RATE] IN BLOOD BY WESTERGREN METHOD: 22 MM/H
ERYTHROCYTE [SEDIMENTATION RATE] IN BLOOD BY WESTERGREN METHOD: 24 MM/H
EST. GFR  (AFRICAN AMERICAN): >60 ML/MIN/1.73 M^2
EST. GFR  (NON AFRICAN AMERICAN): >60 ML/MIN/1.73 M^2
FIO2: 40
GLUCOSE SERPL-MCNC: 102 MG/DL (ref 70–110)
GLUCOSE SERPL-MCNC: 106 MG/DL (ref 70–110)
GLUCOSE SERPL-MCNC: 94 MG/DL (ref 70–110)
GLUCOSE SERPL-MCNC: 94 MG/DL (ref 70–110)
HCO3 UR-SCNC: 24.4 MMOL/L (ref 24–28)
HCO3 UR-SCNC: 24.5 MMOL/L (ref 24–28)
HCO3 UR-SCNC: 24.9 MMOL/L (ref 24–28)
HCO3 UR-SCNC: 25 MMOL/L (ref 24–28)
HCO3 UR-SCNC: 25.5 MMOL/L (ref 24–28)
HCO3 UR-SCNC: 26.3 MMOL/L (ref 24–28)
HCO3 UR-SCNC: 27.5 MMOL/L (ref 24–28)
HCT VFR BLD AUTO: 26.7 % (ref 37–48.5)
HCT VFR BLD CALC: 24 %PCV (ref 36–54)
HGB BLD-MCNC: 8 G/DL (ref 12–16)
IMM GRANULOCYTES # BLD AUTO: 0.05 K/UL (ref 0–0.04)
IMM GRANULOCYTES NFR BLD AUTO: 0.5 % (ref 0–0.5)
INR PPP: 0.9 (ref 0.8–1.2)
LDH SERPL L TO P-CCNC: 0.57 MMOL/L (ref 0.36–1.25)
LDH SERPL L TO P-CCNC: 0.57 MMOL/L (ref 0.36–1.25)
LDH SERPL L TO P-CCNC: 0.58 MMOL/L (ref 0.36–1.25)
LDH SERPL L TO P-CCNC: 0.6 MMOL/L (ref 0.36–1.25)
LDH SERPL L TO P-CCNC: 0.64 MMOL/L (ref 0.36–1.25)
LDH SERPL L TO P-CCNC: 0.73 MMOL/L (ref 0.36–1.25)
LYMPHOCYTES # BLD AUTO: 1 K/UL (ref 1–4.8)
LYMPHOCYTES NFR BLD: 9.8 % (ref 18–48)
MAGNESIUM SERPL-MCNC: 1.9 MG/DL (ref 1.6–2.6)
MAGNESIUM SERPL-MCNC: 2 MG/DL (ref 1.6–2.6)
MAGNESIUM SERPL-MCNC: 2 MG/DL (ref 1.6–2.6)
MCH RBC QN AUTO: 29.3 PG (ref 27–31)
MCHC RBC AUTO-ENTMCNC: 30 G/DL (ref 32–36)
MCV RBC AUTO: 98 FL (ref 82–98)
MODE: ABNORMAL
MODE: NORMAL
MODE: NORMAL
MONOCYTES # BLD AUTO: 1.4 K/UL (ref 0.3–1)
MONOCYTES NFR BLD: 14.6 % (ref 4–15)
NEUTROPHILS # BLD AUTO: 7.2 K/UL (ref 1.8–7.7)
NEUTROPHILS NFR BLD: 73.7 % (ref 38–73)
NRBC BLD-RTO: 0 /100 WBC
PCO2 BLDA: 39.1 MMHG (ref 35–45)
PCO2 BLDA: 40.3 MMHG (ref 35–45)
PCO2 BLDA: 40.7 MMHG (ref 35–45)
PCO2 BLDA: 40.8 MMHG (ref 35–45)
PCO2 BLDA: 41.7 MMHG (ref 35–45)
PEEP: 6
PEEP: 7
PEEP: 7
PEEP: 8
PEEP: 8
PH SMN: 7.39 [PH] (ref 7.35–7.45)
PH SMN: 7.39 [PH] (ref 7.35–7.45)
PH SMN: 7.4 [PH] (ref 7.35–7.45)
PH SMN: 7.4 [PH] (ref 7.35–7.45)
PH SMN: 7.41 [PH] (ref 7.35–7.45)
PH SMN: 7.44 [PH] (ref 7.35–7.45)
PH SMN: 7.44 [PH] (ref 7.35–7.45)
PHOSPHATE SERPL-MCNC: 2.4 MG/DL (ref 2.7–4.5)
PHOSPHATE SERPL-MCNC: 2.5 MG/DL (ref 2.7–4.5)
PHOSPHATE SERPL-MCNC: 2.9 MG/DL (ref 2.7–4.5)
PLATELET # BLD AUTO: 146 K/UL (ref 150–350)
PMV BLD AUTO: 11.3 FL (ref 9.2–12.9)
PO2 BLDA: 106 MMHG (ref 80–100)
PO2 BLDA: 106 MMHG (ref 80–100)
PO2 BLDA: 107 MMHG (ref 80–100)
PO2 BLDA: 111 MMHG (ref 80–100)
PO2 BLDA: 116 MMHG (ref 80–100)
PO2 BLDA: 116 MMHG (ref 80–100)
PO2 BLDA: 87 MMHG (ref 80–100)
POC BE: 0 MMOL/L
POC BE: 1 MMOL/L
POC BE: 2 MMOL/L
POC BE: 3 MMOL/L
POC IONIZED CALCIUM: 1.08 MMOL/L (ref 1.06–1.42)
POC SATURATED O2: 97 % (ref 95–100)
POC SATURATED O2: 98 % (ref 95–100)
POC SATURATED O2: 99 % (ref 95–100)
POC TCO2: 26 MMOL/L (ref 23–27)
POC TCO2: 27 MMOL/L (ref 23–27)
POC TCO2: 27 MMOL/L (ref 23–27)
POC TCO2: 29 MMOL/L (ref 23–27)
POCT GLUCOSE: 100 MG/DL (ref 70–110)
POCT GLUCOSE: 111 MG/DL (ref 70–110)
POCT GLUCOSE: 113 MG/DL (ref 70–110)
POCT GLUCOSE: 122 MG/DL (ref 70–110)
POCT GLUCOSE: 95 MG/DL (ref 70–110)
POTASSIUM BLD-SCNC: 4.6 MMOL/L (ref 3.5–5.1)
POTASSIUM SERPL-SCNC: 4.6 MMOL/L (ref 3.5–5.1)
POTASSIUM SERPL-SCNC: 4.8 MMOL/L (ref 3.5–5.1)
PROT SERPL-MCNC: 6.8 G/DL (ref 6–8.4)
PROTHROMBIN TIME: 9.9 SEC (ref 9–12.5)
PS: 10
PS: 10
PS: 12
PS: 12
PS: 8
PS: 8
RBC # BLD AUTO: 2.73 M/UL (ref 4–5.4)
SAMPLE: ABNORMAL
SAMPLE: NORMAL
SAMPLE: NORMAL
SITE: ABNORMAL
SITE: NORMAL
SITE: NORMAL
SODIUM BLD-SCNC: 141 MMOL/L (ref 136–145)
SODIUM SERPL-SCNC: 139 MMOL/L (ref 136–145)
SODIUM SERPL-SCNC: 141 MMOL/L (ref 136–145)
SP02: 100
SP02: 100
SP02: 98
SPONT RATE: 18
SPONT RATE: 19
SPONT RATE: 22
VT: 325
WBC # BLD AUTO: 9.73 K/UL (ref 3.9–12.7)

## 2020-01-20 PROCEDURE — 84295 ASSAY OF SERUM SODIUM: CPT

## 2020-01-20 PROCEDURE — 80100008 HC CRRT DAILY MAINTENANCE

## 2020-01-20 PROCEDURE — 83735 ASSAY OF MAGNESIUM: CPT

## 2020-01-20 PROCEDURE — 63600175 PHARM REV CODE 636 W HCPCS: Performed by: SURGERY

## 2020-01-20 PROCEDURE — 84132 ASSAY OF SERUM POTASSIUM: CPT

## 2020-01-20 PROCEDURE — 25000003 PHARM REV CODE 250: Performed by: SURGERY

## 2020-01-20 PROCEDURE — 99232 PR SUBSEQUENT HOSPITAL CARE,LEVL II: ICD-10-PCS | Mod: ,,, | Performed by: NURSE PRACTITIONER

## 2020-01-20 PROCEDURE — 25000003 PHARM REV CODE 250: Performed by: NURSE PRACTITIONER

## 2020-01-20 PROCEDURE — 37799 UNLISTED PX VASCULAR SURGERY: CPT

## 2020-01-20 PROCEDURE — 25000003 PHARM REV CODE 250: Performed by: STUDENT IN AN ORGANIZED HEALTH CARE EDUCATION/TRAINING PROGRAM

## 2020-01-20 PROCEDURE — 99900026 HC AIRWAY MAINTENANCE (STAT)

## 2020-01-20 PROCEDURE — 80069 RENAL FUNCTION PANEL: CPT

## 2020-01-20 PROCEDURE — 83605 ASSAY OF LACTIC ACID: CPT

## 2020-01-20 PROCEDURE — 20000000 HC ICU ROOM

## 2020-01-20 PROCEDURE — 63600175 PHARM REV CODE 636 W HCPCS: Performed by: STUDENT IN AN ORGANIZED HEALTH CARE EDUCATION/TRAINING PROGRAM

## 2020-01-20 PROCEDURE — 80069 RENAL FUNCTION PANEL: CPT | Mod: 91

## 2020-01-20 PROCEDURE — 27000221 HC OXYGEN, UP TO 24 HOURS

## 2020-01-20 PROCEDURE — 99900035 HC TECH TIME PER 15 MIN (STAT)

## 2020-01-20 PROCEDURE — 82330 ASSAY OF CALCIUM: CPT

## 2020-01-20 PROCEDURE — 27200188 HC TRANSDUCER, ART ADULT/PEDS

## 2020-01-20 PROCEDURE — C9113 INJ PANTOPRAZOLE SODIUM, VIA: HCPCS | Performed by: SURGERY

## 2020-01-20 PROCEDURE — 94003 VENT MGMT INPAT SUBQ DAY: CPT

## 2020-01-20 PROCEDURE — 63600175 PHARM REV CODE 636 W HCPCS: Performed by: NURSE PRACTITIONER

## 2020-01-20 PROCEDURE — 85025 COMPLETE CBC W/AUTO DIFF WBC: CPT

## 2020-01-20 PROCEDURE — 90945 PR DIALYSIS, NOT HEMO, 1 EVAL: ICD-10-PCS | Mod: ,,, | Performed by: NURSE PRACTITIONER

## 2020-01-20 PROCEDURE — 82803 BLOOD GASES ANY COMBINATION: CPT

## 2020-01-20 PROCEDURE — 85014 HEMATOCRIT: CPT

## 2020-01-20 PROCEDURE — 85610 PROTHROMBIN TIME: CPT

## 2020-01-20 PROCEDURE — C9113 INJ PANTOPRAZOLE SODIUM, VIA: HCPCS | Performed by: STUDENT IN AN ORGANIZED HEALTH CARE EDUCATION/TRAINING PROGRAM

## 2020-01-20 PROCEDURE — 90945 DIALYSIS ONE EVALUATION: CPT

## 2020-01-20 PROCEDURE — 86920 COMPATIBILITY TEST SPIN: CPT

## 2020-01-20 PROCEDURE — 94761 N-INVAS EAR/PLS OXIMETRY MLT: CPT

## 2020-01-20 PROCEDURE — 83735 ASSAY OF MAGNESIUM: CPT | Mod: 91

## 2020-01-20 PROCEDURE — 99232 SBSQ HOSP IP/OBS MODERATE 35: CPT | Mod: ,,, | Performed by: NURSE PRACTITIONER

## 2020-01-20 PROCEDURE — 90945 DIALYSIS ONE EVALUATION: CPT | Mod: ,,, | Performed by: NURSE PRACTITIONER

## 2020-01-20 PROCEDURE — 80053 COMPREHEN METABOLIC PANEL: CPT

## 2020-01-20 PROCEDURE — 86850 RBC ANTIBODY SCREEN: CPT

## 2020-01-20 RX ORDER — HYDROCODONE BITARTRATE AND ACETAMINOPHEN 500; 5 MG/1; MG/1
TABLET ORAL CONTINUOUS
Status: DISCONTINUED | OUTPATIENT
Start: 2020-01-20 | End: 2020-01-21

## 2020-01-20 RX ORDER — MAGNESIUM SULFATE HEPTAHYDRATE 40 MG/ML
2 INJECTION, SOLUTION INTRAVENOUS
Status: DISCONTINUED | OUTPATIENT
Start: 2020-01-20 | End: 2020-01-21

## 2020-01-20 RX ORDER — PANTOPRAZOLE SODIUM 40 MG/10ML
40 INJECTION, POWDER, LYOPHILIZED, FOR SOLUTION INTRAVENOUS 2 TIMES DAILY
Status: DISCONTINUED | OUTPATIENT
Start: 2020-01-20 | End: 2020-01-23

## 2020-01-20 RX ORDER — CHLORHEXIDINE GLUCONATE ORAL RINSE 1.2 MG/ML
15 SOLUTION DENTAL 2 TIMES DAILY
Status: CANCELLED | OUTPATIENT
Start: 2020-01-20

## 2020-01-20 RX ADMIN — SODIUM CHLORIDE: 0.9 INJECTION, SOLUTION INTRAVENOUS at 03:01

## 2020-01-20 RX ADMIN — HYDROMORPHONE HYDROCHLORIDE 0.2 MG: 1 INJECTION, SOLUTION INTRAMUSCULAR; INTRAVENOUS; SUBCUTANEOUS at 08:01

## 2020-01-20 RX ADMIN — POLYETHYLENE GLYCOL 3350 17 G: 17 POWDER, FOR SOLUTION ORAL at 08:01

## 2020-01-20 RX ADMIN — HYDROMORPHONE HYDROCHLORIDE 0.2 MG: 1 INJECTION, SOLUTION INTRAMUSCULAR; INTRAVENOUS; SUBCUTANEOUS at 05:01

## 2020-01-20 RX ADMIN — HEPARIN SODIUM 5000 UNITS: 5000 INJECTION, SOLUTION INTRAVENOUS; SUBCUTANEOUS at 06:01

## 2020-01-20 RX ADMIN — PANTOPRAZOLE SODIUM 40 MG: 40 INJECTION, POWDER, FOR SOLUTION INTRAVENOUS at 08:01

## 2020-01-20 RX ADMIN — ATORVASTATIN CALCIUM 80 MG: 20 TABLET, FILM COATED ORAL at 08:01

## 2020-01-20 RX ADMIN — MAGNESIUM SULFATE IN WATER 2 G: 40 INJECTION, SOLUTION INTRAVENOUS at 04:01

## 2020-01-20 RX ADMIN — SODIUM CHLORIDE: 0.9 INJECTION, SOLUTION INTRAVENOUS at 05:01

## 2020-01-20 RX ADMIN — CLOPIDOGREL BISULFATE 75 MG: 75 TABLET, FILM COATED ORAL at 09:01

## 2020-01-20 RX ADMIN — HEPARIN SODIUM 5000 UNITS: 5000 INJECTION, SOLUTION INTRAVENOUS; SUBCUTANEOUS at 10:01

## 2020-01-20 RX ADMIN — HEPARIN SODIUM 5000 UNITS: 5000 INJECTION, SOLUTION INTRAVENOUS; SUBCUTANEOUS at 02:01

## 2020-01-20 RX ADMIN — ASPIRIN 81 MG CHEWABLE TABLET 81 MG: 81 TABLET CHEWABLE at 08:01

## 2020-01-20 RX ADMIN — SODIUM PHOSPHATE, MONOBASIC, MONOHYDRATE 20.01 MMOL: 276; 142 INJECTION, SOLUTION INTRAVENOUS at 06:01

## 2020-01-20 RX ADMIN — SODIUM PHOSPHATE, MONOBASIC, MONOHYDRATE 30 MMOL: 276; 142 INJECTION, SOLUTION INTRAVENOUS at 06:01

## 2020-01-20 RX ADMIN — DOBUTAMINE IN DEXTROSE 5 MCG/KG/MIN: 200 INJECTION, SOLUTION INTRAVENOUS at 08:01

## 2020-01-20 RX ADMIN — HYDROMORPHONE HYDROCHLORIDE 0.2 MG: 1 INJECTION, SOLUTION INTRAMUSCULAR; INTRAVENOUS; SUBCUTANEOUS at 12:01

## 2020-01-20 RX ADMIN — SODIUM CHLORIDE: 0.9 INJECTION, SOLUTION INTRAVENOUS at 10:01

## 2020-01-20 RX ADMIN — DEXMEDETOMIDINE HYDROCHLORIDE 0.2 MCG/KG/HR: 4 INJECTION, SOLUTION INTRAVENOUS at 07:01

## 2020-01-20 RX ADMIN — HYDROMORPHONE HYDROCHLORIDE 0.2 MG: 1 INJECTION, SOLUTION INTRAMUSCULAR; INTRAVENOUS; SUBCUTANEOUS at 03:01

## 2020-01-20 NOTE — ASSESSMENT & PLAN NOTE
Neuro:   - sedated with propofol/precedex     Pulmonary:   - Worsening respiratory distress, intubated 1/16  - Wean vent settings  - Daily CXR  - ABG prn  - possibly try SBT today     Cardiac:  - Epi @ 0.005  - Echo 1/17:            Mildly decreased left ventricular systolic function. The estimated ejection fraction is 50%.  · Concentric left ventricular remodeling.  · Local segmental wall motion abnormalities.  · Septal wall has abnormal motion.  · Grade I (mild) left ventricular diastolic dysfunction consistent with impaired relaxation.  · Normal right ventricular systolic function.  · Severe tricuspid regurgitation.   -Pacer turned off for time being    Renal:   - CRRT on, tolerating well  - UF @ 350  - BUN/Cr ok, improving  - Kerns in place for close monitoring of UOP     Infectious Disease:   - Afebrile, WBC 9.7  - Antibiotics: Perioperative     Hematology/Oncology:  - monitor H/H, today 8.0/26.7  - Transfused pRBC x2 1/16, appropriate response  - Continue to monitor CT output     Endocrine:  - Endocrine consulted, keep blood glucose goals 140-180     Fluids/Electrolytes/Nutrition/GI:   - D5 NS 20 KCl @ 5  - Trend CMP  - LFTs trending down  - Replace Lytes PRN   - NPO  - Scopolamine patch, PRN Zofran     Prophylaxis:  - GI: protonix daily     Dispo:  Continue ICU care, continue close monitoring of ABGs/wean vent.   Primary team: JI

## 2020-01-20 NOTE — PLAN OF CARE
Pt remains intubated on 40% FiO2 6 PEEP. Able to follow commands. Gtts dobutamine 5 mcg/kg/min, epi @0.03 mcg/kg/min, precedex @ 0.2 mg/kg/hr. 25 cc UO this shift. Chest tube output minimal. CRRT UF @350. Left arm warm, swollen, tender to touch. MD aware, ultrasound done this shift. OGT has some red/blood tinged output, MD aware. CVP 9-10. Repositioned Q2 or more for comfort and prevention of skin break down. VSS. Will continue to monitor.

## 2020-01-20 NOTE — PROGRESS NOTES
[facility]  Nephrology  Progress Note    Patient Name: Suyapa Connelly  MRN: [id number]  Admission Date: 1/2/2020  Hospital Length of Stay: 18 days  Attending Provider: Huang Altamirano MD   Primary Care Physician: Erlinda Rahman NP  Principal Problem:Acute coronary syndrome    Subjective:     Interval History:   See on SLED this morning, tolerating well.  HDS on exam off pressor support.  Net negative 2.4L/24h, CVP 9.    Review of patient's allergies indicates:   Allergen Reactions    Contrast media      Kidney injury    Pcn [penicillins]      Rash; tolerated ceftriaxone on 1/13/20     Current Facility-Administered Medications   Medication Frequency    0.9%  NaCl infusion (CRRT USE ONLY) Continuous    acetaminophen tablet 650 mg Q4H PRN    acetaminophen tablet 650 mg Q6H PRN    aspirin chewable tablet 81 mg Daily    atorvastatin tablet 80 mg QHS    bisacodyl suppository 10 mg Q12H PRN    clopidogrel tablet 75 mg Daily    dexmedetomidine (PRECEDEX) 400mcg/100mL 0.9% NaCL infusion Continuous    dextrose 10% (D10W) Bolus PRN    dextrose 10% (D10W) Bolus PRN    dextrose 5 % and 0.9 % NaCl with KCl 20 mEq infusion Continuous    DOBUTamine 500mg in D5W 250mL infusion (premix) (NON-TITRATING) Continuous    EPINEPHrine (ADRENALIN) 5 mg in sodium chloride 0.9% 250 mL infusion Continuous    fentaNYL 2500 mcg in 0.9% sodium chloride 250 mL infusion premix (titrating) Continuous    glucagon (human recombinant) injection 1 mg PRN    heparin (porcine) injection 5,000 Units Q8H    HYDROmorphone injection 0.2 mg Q1H PRN    insulin aspart U-100 pen 0-5 Units Q4H PRN    [START ON 1/21/2020] insulin detemir U-100 pen 10 Units Daily    magnesium sulfate 2g in water 50mL IVPB (premix) PRN    metoclopramide HCl injection 10 mg Q6H PRN    ondansetron disintegrating tablet 8 mg Q8H PRN    oxyCODONE immediate release tablet 10 mg Q4H PRN    oxyCODONE immediate release tablet 5 mg Q4H PRN     pantoprazole injection 40 mg Daily    polyethylene glycol packet 17 g Daily    propofol (DIPRIVAN) 10 mg/mL infusion Continuous    scopolamine 1.3-1.5 mg (1 mg over 3 days) 1 patch Q3 Days    sodium phosphate 20.01 mmol in dextrose 5 % 250 mL IVPB PRN    sodium phosphate 30 mmol in dextrose 5 % 250 mL IVPB PRN    sodium phosphate 39.99 mmol in dextrose 5 % 250 mL IVPB PRN       Objective:     Vital Signs (Most Recent):  Temp: 99.7 °F (37.6 °C) (01/20/20 1100)  Pulse: 77 (01/20/20 1200)  Resp: 16 (01/20/20 1119)  BP: 130/60 (01/20/20 1200)  SpO2: 100 % (01/20/20 1200)  O2 Device (Oxygen Therapy): ventilator (01/20/20 1200) Vital Signs (24h Range):  Temp:  [97.6 °F (36.4 °C)-99.7 °F (37.6 °C)] 99.7 °F (37.6 °C)  Pulse:  [68-86] 77  Resp:  [16-24] 16  SpO2:  [67 %-100 %] 100 %  BP: (108-163)/(56-73) 130/60  Arterial Line BP: ()/() 108/49     Weight: 107.3 kg (236 lb 8.9 oz) (01/19/20 0549)  Body mass index is 40.6 kg/m².  Body surface area is 2.2 meters squared.    I/O last 3 completed shifts:  In: 9505.9 [I.V.:9205.9; NG/GT:300]  Out: 75443 [Urine:46; Drains:150; Other:15479; Chest Tube:80]    Physical Exam   Constitutional: She appears well-developed and well-nourished.   HENT:   Head: Normocephalic and atraumatic.   Eyes: Pupils are equal, round, and reactive to light. Conjunctivae are normal.   Neck: Neck supple.   Cardiovascular: Normal rate.   Pulmonary/Chest: She has no wheezes. She has rales.   Intubated and mechanically ventilated. Chest tubes in place    Abdominal: Soft. She exhibits no distension and no mass. There is no guarding. No hernia.   Neurological:   Sedated   Skin: Skin is warm and dry.       Significant Labs:  ABGs:   Recent Labs   Lab 01/20/20  1210   PH 7.436   PCO2 39.1   HCO3 26.3   POCSATURATED 98   BE 2     CBC:   Recent Labs   Lab 01/20/20  0300 01/20/20  0359   WBC 9.73  --    RBC 2.73*  --    HGB 8.0*  --    HCT 26.7* 24*   *  --    MCV 98  --    MCH 29.3  --     MCHC 30.0*  --      CMP:   Recent Labs   Lab 01/20/20  0300   GLU 94  94   CALCIUM 8.7  8.7   ALBUMIN 2.6*  2.6*   PROT 6.8     141   K 4.8  4.8   CO2 27  27     105   BUN 3*  3*   CREATININE 0.7  0.7   ALKPHOS 163*   *   *   BILITOT 2.7*          Assessment/Plan:     ROSLYN (acute kidney injury)  At time of re-consult: Ms Connelly is a 55 y/o female who is s/p CABG. Nephrology re-consulted for ROSLYN, decreased UOP, and concern for volume overload. Patient is now anuric with sCr trending up (4.4 on 1/16/20). Patient with metabolic acidosis on ABG/serum CO2 of 19. Patient now with concern for volume overload and now intubated after progressively increased oxygen requirements. Concern for pulmonary edema on CXR.     Plan/recommendations:   -Continue SLED for metabolic clearance and volume management. UF goal 300- 350cc/hr.   -Strict I/O's   -Renally dose medications   -Avoid nephrotoxic medications  -Labs per CRRT protocol  -Maintain Hgb >7.0  -Will continue to follow closely              Agustin Vang NP  Nephrology  [facility]

## 2020-01-20 NOTE — PROGRESS NOTES
CRRT:    SLED restarted due to presence of clots in venous chamber. Treatment ran for 32 hours.    Daily checks done. Uf rate @350ml/hr

## 2020-01-20 NOTE — SUBJECTIVE & OBJECTIVE
Interval History:   See on SLED this morning, tolerating well.  HDS on exam off pressor support.  Net negative 2.4L/24h, CVP 9.    Review of patient's allergies indicates:   Allergen Reactions    Contrast media      Kidney injury    Pcn [penicillins]      Rash; tolerated ceftriaxone on 1/13/20     Current Facility-Administered Medications   Medication Frequency    0.9%  NaCl infusion (CRRT USE ONLY) Continuous    acetaminophen tablet 650 mg Q4H PRN    acetaminophen tablet 650 mg Q6H PRN    aspirin chewable tablet 81 mg Daily    atorvastatin tablet 80 mg QHS    bisacodyl suppository 10 mg Q12H PRN    clopidogrel tablet 75 mg Daily    dexmedetomidine (PRECEDEX) 400mcg/100mL 0.9% NaCL infusion Continuous    dextrose 10% (D10W) Bolus PRN    dextrose 10% (D10W) Bolus PRN    dextrose 5 % and 0.9 % NaCl with KCl 20 mEq infusion Continuous    DOBUTamine 500mg in D5W 250mL infusion (premix) (NON-TITRATING) Continuous    EPINEPHrine (ADRENALIN) 5 mg in sodium chloride 0.9% 250 mL infusion Continuous    fentaNYL 2500 mcg in 0.9% sodium chloride 250 mL infusion premix (titrating) Continuous    glucagon (human recombinant) injection 1 mg PRN    heparin (porcine) injection 5,000 Units Q8H    HYDROmorphone injection 0.2 mg Q1H PRN    insulin aspart U-100 pen 0-5 Units Q4H PRN    [START ON 1/21/2020] insulin detemir U-100 pen 10 Units Daily    magnesium sulfate 2g in water 50mL IVPB (premix) PRN    metoclopramide HCl injection 10 mg Q6H PRN    ondansetron disintegrating tablet 8 mg Q8H PRN    oxyCODONE immediate release tablet 10 mg Q4H PRN    oxyCODONE immediate release tablet 5 mg Q4H PRN    pantoprazole injection 40 mg Daily    polyethylene glycol packet 17 g Daily    propofol (DIPRIVAN) 10 mg/mL infusion Continuous    scopolamine 1.3-1.5 mg (1 mg over 3 days) 1 patch Q3 Days    sodium phosphate 20.01 mmol in dextrose 5 % 250 mL IVPB PRN    sodium phosphate 30 mmol in dextrose 5 % 250 mL IVPB PRN     sodium phosphate 39.99 mmol in dextrose 5 % 250 mL IVPB PRN       Objective:     Vital Signs (Most Recent):  Temp: 99.7 °F (37.6 °C) (01/20/20 1100)  Pulse: 77 (01/20/20 1200)  Resp: 16 (01/20/20 1119)  BP: 130/60 (01/20/20 1200)  SpO2: 100 % (01/20/20 1200)  O2 Device (Oxygen Therapy): ventilator (01/20/20 1200) Vital Signs (24h Range):  Temp:  [97.6 °F (36.4 °C)-99.7 °F (37.6 °C)] 99.7 °F (37.6 °C)  Pulse:  [68-86] 77  Resp:  [16-24] 16  SpO2:  [67 %-100 %] 100 %  BP: (108-163)/(56-73) 130/60  Arterial Line BP: ()/() 108/49     Weight: 107.3 kg (236 lb 8.9 oz) (01/19/20 0549)  Body mass index is 40.6 kg/m².  Body surface area is 2.2 meters squared.    I/O last 3 completed shifts:  In: 9505.9 [I.V.:9205.9; NG/GT:300]  Out: 78548 [Urine:46; Drains:150; Other:41120; Chest Tube:80]    Physical Exam   Constitutional: She appears well-developed and well-nourished.   HENT:   Head: Normocephalic and atraumatic.   Eyes: Pupils are equal, round, and reactive to light. Conjunctivae are normal.   Neck: Neck supple.   Cardiovascular: Normal rate.   Pulmonary/Chest: She has no wheezes. She has rales.   Intubated and mechanically ventilated. Chest tubes in place    Abdominal: Soft. She exhibits no distension and no mass. There is no guarding. No hernia.   Neurological:   Sedated   Skin: Skin is warm and dry.       Significant Labs:  ABGs:   Recent Labs   Lab 01/20/20  1210   PH 7.436   PCO2 39.1   HCO3 26.3   POCSATURATED 98   BE 2     CBC:   Recent Labs   Lab 01/20/20  0300 01/20/20  0359   WBC 9.73  --    RBC 2.73*  --    HGB 8.0*  --    HCT 26.7* 24*   *  --    MCV 98  --    MCH 29.3  --    MCHC 30.0*  --      CMP:   Recent Labs   Lab 01/20/20  0300   GLU 94  94   CALCIUM 8.7  8.7   ALBUMIN 2.6*  2.6*   PROT 6.8     141   K 4.8  4.8   CO2 27  27     105   BUN 3*  3*   CREATININE 0.7  0.7   ALKPHOS 163*   *   *   BILITOT 2.7*

## 2020-01-20 NOTE — ASSESSMENT & PLAN NOTE
Neuro:   - sedated with fentanyl/propofol  - continue nimbex  - will attempt to wean nimbex tonight with the help of propofol, precedex if needed     Pulmonary:   - Worsening respiratory distress, intubated 1/16  - Wean vent settings  - Daily CXR  - hourly ABG  - check CVPs often, wean Nitric to off today as tolerated     Cardiac:  - Requiring high levels of epi, vaso, levo and dobutamine over the past few days; wean as able  - Epi @ 0.02, wean as able  - Echo 1/17:            Mildly decreased left ventricular systolic function. The estimated ejection fraction is 50%.  · Concentric left ventricular remodeling.  · Local segmental wall motion abnormalities.  · Septal wall has abnormal motion.  · Grade I (mild) left ventricular diastolic dysfunction consistent with impaired relaxation.  · Normal right ventricular systolic function.  · Severe tricuspid regurgitation.   -Pacer turned off for time being    Renal:   - CRRT on, tolerating well  - UF @ 350  - BUN/Cr ok, improving  - Kerns in place for close monitoring of UOP     Infectious Disease:   - Afebrile, WBC 10.17  - Antibiotics: Perioperative     Hematology/Oncology:  - monitor H/H, today 7.8/25.5  - Transfused pRBC x2 1/16, appropriate response  - Continue to monitor CT output     Endocrine:  - Endocrine consulted, keep blood glucose goals 140-180     Fluids/Electrolytes/Nutrition/GI:   - D5 NS 20 KCl @ 5  - Trend CMP  - LFTs trending down  - Replace Lytes PRN   - NPO  - Scopolamine patch, PRN Zofran     Prophylaxis:  - GI: protonix daily     Dispo:  Continue ICU care, continue close monitoring of ABGs/wean vent. Wean nimbex as able.  Primary team: JI

## 2020-01-20 NOTE — PROGRESS NOTES
[facility]  Critical Care - Surgery  Progress Note    Patient Name: Suyapa Connelly  MRN: [id number]  Admission Date: 1/2/2020  Hospital Length of Stay: 17 days  Code Status: Full Code  Attending Provider: Huang Altamirano MD  Primary Care Provider: Erlinda Rahman NP   Principal Problem: Acute coronary syndrome    Subjective:     Hospital/ICU Course:  1/15: POD1. ON nausea without emesis. Zofran. 2L albumin, 5 amps bicarb. Clinically improving. 250 albumin running.   1/16: Increasing pressor requirements. Intubated for poor oxygenation, Left IJ trialysis placed for CRRT. Nitric added for RV support. Transfused pRBC x2 overnight.     Interval History/Significant Events: No acute events overnight. Stable on CRRT with UF @ 350. Still paralyzed with nimbex @ 1.5. Dobutamine @ 5, epi @ 0.02, Fent @ 75. Minimal to no CT output. Last CVP 7.    Follow-up For: Procedure(s) (LRB):  CORONARY ARTERY BYPASS GRAFT (CABG) x 1     Off Pump (N/A)    Post-Operative Day: 5 Days Post-Op    Objective:     Vital Signs (Most Recent):  Temp: 97.6 °F (36.4 °C) (01/19/20 1500)  Pulse: 70 (01/19/20 1830)  Resp: (!) 24 (01/17/20 1930)  BP: 124/64 (01/19/20 1800)  SpO2: 98 % (01/19/20 1830) Vital Signs (24h Range):  Temp:  [97.6 °F (36.4 °C)-98.3 °F (36.8 °C)] 97.6 °F (36.4 °C)  Pulse:  [67-78] 70  SpO2:  [90 %-100 %] 98 %  BP: (115-174)/(60-81) 124/64  Arterial Line BP: ()/() 96/47     Weight: 107.3 kg (236 lb 8.9 oz)  Body mass index is 40.6 kg/m².      Intake/Output Summary (Last 24 hours) at 1/19/2020 1845  Last data filed at 1/19/2020 1800  Gross per 24 hour   Intake 6587.09 ml   Output 8456 ml   Net -1868.91 ml       Physical Exam   Constitutional: She appears well-developed and well-nourished. She is sedated and intubated.   HENT:   Head: Normocephalic and atraumatic.   Eyes: Pupils are equal, round, and reactive to light.   Neck: Normal range of motion. Neck supple.   Bilateral CVC, dressings CDI    Cardiovascular: Normal rate and regular rhythm.   Pulmonary/Chest: She is intubated.   Mechanically ventilated   Abdominal: Soft. She exhibits no distension. There is no rebound.   Musculoskeletal: Normal range of motion. She exhibits edema. She exhibits no deformity.   Neurological:   sedated   Skin: Skin is warm and dry. No rash noted. No erythema.       Vents:  Vent Mode: A/C (01/19/20 1725)  Ventilator Initiated: Yes (01/16/20 1448)  Set Rate: 24 bmp (01/19/20 1725)  Vt Set: 325 mL (01/19/20 1725)  PEEP/CPAP: 6 cmH20 (01/19/20 1725)  Oxygen Concentration (%): 40 (01/19/20 1830)  Peak Airway Pressure: 21 cmH2O (01/19/20 1725)  Plateau Pressure: 17 cmH20 (01/19/20 1725)  Total Ve: 7.78 mL (01/19/20 1725)  F/VT Ratio<105 (RSBI): 138.12 (01/17/20 1823)    Lines/Drains/Airways     Central Venous Catheter Line                 Introducer right internal jugular -- days         Percutaneous Central Line Insertion/Assessment - triple lumen  right internal jugular -- days         Trialysis (Dialysis) Catheter left internal jugular -- days         Percutaneous Central Line Insertion/Assessment - double lumen  01/14/20 0725 5 days          Drain                 Chest Tube 01/14/20 1017 1 Left Pleural 19 Fr. 5 days         Urethral Catheter 01/14/20 0720 Non-latex;Temperature probe;Straight-tip 14 Fr. 5 days         Y Chest Tube 1 and 2 01/14/20 1018 1 Anterior Mediastinal 19 Fr. 2 Anterior Mediastinal 19 Fr. 5 days         NG/OG Tube 01/16/20 1446 orogastric 3 days          Airway                 Airway - Non-Surgical 01/16/20 1442 Endotracheal Tube 3 days          Arterial Line                 Arterial Line 01/16/20 2300 Right Radial 2 days          Line                 Pacer Wires 01/14/20 1009 5 days                Significant Labs:    CBC/Anemia Profile:  Recent Labs   Lab 01/18/20  0910 01/18/20  1525  01/19/20  0115 01/19/20  0300 01/19/20  0358   WBC 12.37 12.24  --   --  10.17  --    HGB 8.1* 8.3*  --   --  7.8*   --    HCT 26.1* 26.5*   < > 22* 25.5* 21*   * 144*  --   --  144*  --    MCV 96 95  --   --  96  --    RDW 17.4* 17.3*  --   --  17.5*  --     < > = values in this interval not displayed.        Chemistries:  Recent Labs   Lab 01/18/20  1525 01/18/20  2200 01/19/20  0300 01/19/20  1408    141  141  141 141  141  141  141  141 143  143   K 4.5 4.6  4.6  4.6 4.5  4.5  4.5  4.5  4.5 4.2  4.2    106  106  106 107  107  107  107  107 108  108   CO2 25 25  25  25 26  26  26  26  26 27  27   BUN 3* 3*  3*  3* 3*  3*  3*  3*  3* 2*  2*   CREATININE 0.7 0.7  0.7  0.7 0.6  0.6  0.6  0.6  0.6 0.6  0.6   CALCIUM 8.5* 8.2*  8.2*  8.2* 8.1*  8.1*  8.1*  8.1*  8.1* 8.5*  8.5*   ALBUMIN 2.9* 2.7*  2.7*  2.7* 2.5*  2.5*  2.5* 2.5*  2.5*   PROT 6.4 6.2 6.0  --    BILITOT 2.9* 2.9* 2.7*  --    ALKPHOS 179* 130 126  --    * 685* 633*  --    * 265* 234*  --    MG  --  2.1  2.1 2.0  2.0 1.9  1.9   PHOS  --  4.3  4.3 2.5*  2.5* 1.3*  1.3*       ABGs:   Recent Labs   Lab 01/19/20  1533   PH 7.470*   PCO2 35.0   HCO3 25.4   POCSATURATED 99   BE 2     Coagulation:   Recent Labs   Lab 01/19/20  0300   INR 1.0     Lactic Acid: No results for input(s): LACTATE in the last 48 hours.    Significant Imaging:  I have reviewed all pertinent imaging results/findings within the past 24 hours.    Assessment/Plan:     Acute on chronic combined systolic (congestive) and diastolic (congestive) heart failure  Neuro:   - sedated with fentanyl/propofol  - continue nimbex  - will attempt to wean nimbex tonight with the help of propofol, precedex if needed     Pulmonary:   - Worsening respiratory distress, intubated 1/16  - Wean vent settings  - Daily CXR  - hourly ABG  - check CVPs often, wean Nitric to off today as tolerated     Cardiac:  - Requiring high levels of epi, vaso, levo and dobutamine over the past few days; wean as able  - Epi @ 0.02, wean as able  - Echo  1/17:            Mildly decreased left ventricular systolic function. The estimated ejection fraction is 50%.  · Concentric left ventricular remodeling.  · Local segmental wall motion abnormalities.  · Septal wall has abnormal motion.  · Grade I (mild) left ventricular diastolic dysfunction consistent with impaired relaxation.  · Normal right ventricular systolic function.  · Severe tricuspid regurgitation.   -Pacer turned off for time being    Renal:   - CRRT on, tolerating well  - UF @ 350  - BUN/Cr ok, improving  - Kerns in place for close monitoring of UOP     Infectious Disease:   - Afebrile, WBC 10.17  - Antibiotics: Perioperative     Hematology/Oncology:  - monitor H/H, today 7.8/25.5  - Transfused pRBC x2 1/16, appropriate response  - Continue to monitor CT output     Endocrine:  - Endocrine consulted, keep blood glucose goals 140-180     Fluids/Electrolytes/Nutrition/GI:   - D5 NS 20 KCl @ 5  - Trend CMP  - LFTs trending down  - Replace Lytes PRN   - NPO  - Scopolamine patch, PRN Zofran     Prophylaxis:  - GI: protonix daily     Dispo:  Continue ICU care, continue close monitoring of ABGs/wean vent. Wean nimbex as able.  Primary team: CTS         Critical care was time spent personally by me on the following activities: development of treatment plan with patient or surrogate and bedside caregivers, discussions with consultants, evaluation of patient's response to treatment, examination of patient, ordering and performing treatments and interventions, ordering and review of laboratory studies, ordering and review of radiographic studies, pulse oximetry, re-evaluation of patient's condition.  This critical care time did not overlap with that of any other provider or involve time for any procedures.     Christina Galvez MD  Critical Care - Surgery  [facility]

## 2020-01-20 NOTE — ASSESSMENT & PLAN NOTE
"BG goal 140 - 180   Fasting BG below goal ranges this morning.    Decrease Levemir to 10 units daily. Fasting BG below goal ranges. (20% dose decrease).   Low Dose SQ Insulin Correction Scale every 4 hours while NPO.   BG monitoring every 4 hours while NPO.     ** Please call Endocrine for any BG related issues **  ** Please notify Endocrine for any change and/or advance in diet**      Discharge Planning:     TBD. Please notify endocrinology prior to discharge. Patient will need adjustments to home insulin regimen.     Tentative:     Levemir dose TBD    Novolog dose TBD    Low Dose SQ Insulin Correction Scale.    BG Monitoring AC/HS     PRN Insurance preferred diabetes testing supplies    PRN Ultra-Fine Junie Pen Needles 4mm x 32 G (5/32" x 0.23mm).     Patient request outpatient follow-up with [facility] endo clinic for continued DM management.        "

## 2020-01-20 NOTE — PROGRESS NOTES
"[facility]  Endocrinology  Progress Note    Admit Date: 1/2/2020     Reason for Consult: Management of T2DM, Hyperglycemia     Surgical Procedure and Date: n/a    Diabetes diagnosis year:  2006    Home Diabetes Medications:  Lantus 50 units daily and novolog 9 units with meals   Lab Results   Component Value Date    HGBA1C 9.7 (H) 01/03/2020         How often checking glucose at home? 1-3 x day   BG readings on regimen: 100-200 typically; sometimes in 300s   Hypoglycemia on the regimen?  Yes occasionally - usually overnight   Missed doses on regimen?  Yes    Diabetes Complications include:     Hyperglycemia, Hypoglycemia  and Diabetic peripheral neuropathy     Complicating diabetes co morbidities:   History of MI and CHF      HPI:   Patient is a 54 y.o. female with a diagnosis of uncontrolled type 2 DM. Also with CHF, PAD, HTN, HLD, and CAD. Patient presented with chest pain and diaphoresis. Also with nauseated, diaphoretic, a mild headache, epigastric pain, with a vague "heart burn" like feeling in her chest. Patient admitted for treatment and further workup. Endocrinology consulted for BG/ DM management.             Interval HPI:   Overnight events: Remains intubated and sedated in SICU. BG below goal ranges on current SQ insulin regimen.   Eating:   NPO  Nausea: No  Hypoglycemia and intervention: No  Fever: No  TPN and/or TF: No  If yes, type of TF/TPN and rate: none    BP (!) 130/59 (BP Location: Right arm, Patient Position: Lying)   Pulse 78   Temp 99 °F (37.2 °C) (Oral)   Resp (!) 24   Ht 5' 4" (1.626 m)   Wt 107.3 kg (236 lb 8.9 oz)   LMP 09/30/2015 (Exact Date)   SpO2 100%   Breastfeeding? No   BMI 40.60 kg/m²      Labs Reviewed and Include    Recent Labs   Lab 01/20/20  0300   GLU 94  94   CALCIUM 8.7  8.7   ALBUMIN 2.6*  2.6*   PROT 6.8     141   K 4.8  4.8   CO2 27  27     105   BUN 3*  3*   CREATININE 0.7  0.7   ALKPHOS 163*   *   * "   BILITOT 2.7*     Lab Results   Component Value Date    WBC 9.73 01/20/2020    HGB 8.0 (L) 01/20/2020    HCT 24 (L) 01/20/2020    MCV 98 01/20/2020     (L) 01/20/2020     No results for input(s): TSH, FREET4 in the last 168 hours.  Lab Results   Component Value Date    HGBA1C 9.7 (H) 01/03/2020       Nutritional status:   Body mass index is 40.6 kg/m².  Lab Results   Component Value Date    ALBUMIN 2.6 (L) 01/20/2020    ALBUMIN 2.6 (L) 01/20/2020    ALBUMIN 2.6 (L) 01/19/2020     No results found for: PREALBUMIN    Estimated Creatinine Clearance: 111.1 mL/min (based on SCr of 0.7 mg/dL).    Accu-Checks  Recent Labs     01/19/20  0314 01/19/20  0751 01/19/20  1109 01/19/20  1152 01/19/20  1529 01/19/20  1531 01/19/20  1708 01/19/20  2153 01/20/20  0303 01/20/20  0800   POCTGLUCOSE 117* 122* 86 96 70 79 76 121* 95 100       Current Medications and/or Treatments Impacting Glycemic Control  Immunotherapy:    Immunosuppressants     None        Steroids:   Hormones (From admission, onward)    Start     Stop Route Frequency Ordered    01/14/20 1230  vasopressin (PITRESSIN) 0.2 Units/mL in dextrose 5 % 100 mL infusion      -- IV Continuous 01/14/20 1129        Pressors:    Autonomic Drugs (From admission, onward)    Start     Stop Route Frequency Ordered    01/17/20 0915  cisatracurium (NIMBEX) 200 mg in dextrose 5 % 100 mL infusion     Question Answer Comment   Bolus over 1 minute (in mg): 10    Begin at (in mcg/kg/min): 1    Titrate by: (in mcg/kg/min) 0.05    Titrate interval: (in minutes) 15    To maintain a train-of-four of (TOF_/4): 4/4    Maximum dose: (in mcg/kg/min) 3        -- IV Continuous 01/17/20 0913    01/16/20 1430  succinylcholine (ANECTINE) 20 mg/mL injection     Note to Pharmacy:  Created by cabinet override    01/17 0244   01/16/20 1430    01/14/20 1853  rocuronium 10 mg/mL injection     Note to Pharmacy:  Created by cabinet override    01/15 0659   01/14/20 1853    01/14/20 1230  EPINEPHrine  "(ADRENALIN) 5 mg in sodium chloride 0.9% 250 mL infusion     Question Answer Comment   Titrate by: (in mcg/kg/min) 0.01    Titrate interval: (in minutes) 5    Titrate to maintain: (SBP or MAP or Cardiac Index) SBP    Maximum dose: (in mcg/kg/min) 2        -- IV Continuous 01/14/20 1129    01/14/20 1230  norepinephrine 4 mg in dextrose 5% 250 mL infusion (premix) (titrating)     Question Answer Comment   Titrate by: (in mcg/kg/min) 0.05    Titrate interval: (in minutes) 5    Titrate to maintain: (MAP or SBP) MAP    Greater than: (in mmHg) 65    Maximum dose: (in mcg/kg/min) 3        -- IV Continuous 01/14/20 1129        Hyperglycemia/Diabetes Medications:   Antihyperglycemics (From admission, onward)    Start     Stop Route Frequency Ordered    01/21/20 0900  insulin detemir U-100 pen 10 Units      -- SubQ Daily 01/20/20 0841    01/15/20 0946  insulin aspart U-100 pen 0-5 Units      -- SubQ Every 4 hours PRN 01/15/20 0847          ASSESSMENT and PLAN    * Acute coronary syndrome  Managed per primary team  Avoid hypoglycemia        Type 2 diabetes mellitus with hyperglycemia, with long-term current use of insulin  BG goal 140 - 180   Fasting BG below goal ranges this morning.    Decrease Levemir to 10 units daily. Fasting BG below goal ranges. (20% dose decrease).   Low Dose SQ Insulin Correction Scale every 4 hours while NPO.   BG monitoring every 4 hours while NPO.     ** Please call Endocrine for any BG related issues **  ** Please notify Endocrine for any change and/or advance in diet**      Discharge Planning:     TBD. Please notify endocrinology prior to discharge. Patient will need adjustments to home insulin regimen.     Tentative:     Levemir dose TBD    Novolog dose TBD    Low Dose SQ Insulin Correction Scale.    BG Monitoring AC/HS     PRN Insurance preferred diabetes testing supplies    PRN Ultra-Fine Junie Pen Needles 4mm x 32 G (5/32" x 0.23mm).     Patient request outpatient follow-up with [facility] endo " clinic for continued DM management.          ROSLYN (acute kidney injury)  Caution with insulin stacking        Mixed hyperlipidemia  Managed per primary team  Condition may cause insulin resistance         Essential hypertension  Managed per primary team  Condition may cause insulin resistance         Acute on chronic combined systolic (congestive) and diastolic (congestive) heart failure  Optimize glucose control and  avoid hypoglycemia          CAD (coronary artery disease)  Optimize glucose control and  avoid hypoglycemia            Mary Baptiste, ANDREA  Endocrinology  [facility]

## 2020-01-20 NOTE — ASSESSMENT & PLAN NOTE
At time of re-consult: Ms Connelly is a 53 y/o female who is s/p CABG. Nephrology re-consulted for ROSLYN, decreased UOP, and concern for volume overload. Patient is now anuric with sCr trending up (4.4 on 1/16/20). Patient with metabolic acidosis on ABG/serum CO2 of 19. Patient now with concern for volume overload and now intubated after progressively increased oxygen requirements. Concern for pulmonary edema on CXR.     Plan/recommendations:   -Continue SLED for metabolic clearance and volume management. UF goal 300- 350cc/hr.   -Strict I/O's   -Renally dose medications   -Avoid nephrotoxic medications  -Labs per CRRT protocol  -Maintain Hgb >7.0  -Will continue to follow closely

## 2020-01-20 NOTE — PROGRESS NOTES
Sedation vacation performed. Pt awakens and follows commands. All extremities moving freely and equally. Propofol turned back on currently at 15 mcg/kg/min

## 2020-01-20 NOTE — PROGRESS NOTES
[facility]  Critical Care - Surgery  Progress Note    Patient Name: Suyapa Connelly  MRN: [id number]  Admission Date: 1/2/2020  Hospital Length of Stay: 18 days  Code Status: Full Code  Attending Provider: Huang Altamirano MD  Primary Care Provider: Erlinda Rahman NP   Principal Problem: Acute coronary syndrome    Subjective:     Hospital/ICU Course:  1/15: POD1. ON nausea without emesis. Zofran. 2L albumin, 5 amps bicarb. Clinically improving. 250 albumin running.   1/16: Increasing pressor requirements. Intubated for poor oxygenation, Left IJ trialysis placed for CRRT. Nitric added for RV support. Transfused pRBC x2 overnight.   1/19: Decreasing vent settings. Gases continue to look good. Decreasing pressor requirements. CRRT continues without issue. Paralysis discontinued.     Interval History/Significant Events: NAEON. Paralysis discontinued. Moving all fours spontaneously. Following commands.     Follow-up For: Procedure(s) (LRB):  CORONARY ARTERY BYPASS GRAFT (CABG) x 1     Off Pump (N/A)    Post-Operative Day: 6 Days Post-Op    Objective:     Vital Signs (Most Recent):  Temp: 99.7 °F (37.6 °C) (01/20/20 1100)  Pulse: 79 (01/20/20 1100)  Resp: 17 (01/20/20 0918)  BP: 127/67 (01/20/20 1100)  SpO2: 100 % (01/20/20 1100) Vital Signs (24h Range):  Temp:  [97.6 °F (36.4 °C)-99.7 °F (37.6 °C)] 99.7 °F (37.6 °C)  Pulse:  [68-86] 79  Resp:  [17-24] 17  SpO2:  [67 %-100 %] 100 %  BP: (108-163)/(56-73) 127/67  Arterial Line BP: ()/() 107/48     Weight: 107.3 kg (236 lb 8.9 oz)  Body mass index is 40.6 kg/m².      Intake/Output Summary (Last 24 hours) at 1/20/2020 1122  Last data filed at 1/20/2020 1100  Gross per 24 hour   Intake 6138.8 ml   Output 8576 ml   Net -2437.2 ml       Physical Exam   Constitutional: She appears well-developed and well-nourished. She is sedated and intubated.   HENT:   Head: Normocephalic and atraumatic.   Eyes: Pupils are equal, round, and reactive to  light.   Neck: Normal range of motion. Neck supple.   Bilateral CVC, dressings CDI   Cardiovascular: Normal rate and regular rhythm.   Pulmonary/Chest: She is intubated.   Mechanically ventilated   Abdominal: Soft. She exhibits no distension. There is no rebound.   Musculoskeletal: Normal range of motion. She exhibits edema. She exhibits no deformity.   Neurological:   sedated   Skin: Skin is warm and dry. No rash noted. No erythema.       Vents:  Vent Mode: Spont (01/20/20 0918)  Ventilator Initiated: Yes (01/16/20 1448)  Set Rate: 22 bmp (01/20/20 0500)  Vt Set: 325 mL (01/19/20 2342)  Pressure Support: 12 cmH20 (01/20/20 0918)  PEEP/CPAP: 6 cmH20 (01/20/20 0918)  Oxygen Concentration (%): 40 (01/20/20 1100)  Peak Airway Pressure: 18 cmH2O (01/20/20 0918)  Plateau Pressure: 17 cmH20 (01/20/20 0918)  Total Ve: 7.28 mL (01/20/20 0918)  F/VT Ratio<105 (RSBI): (!) 41.46 (01/20/20 0918)    Lines/Drains/Airways     Central Venous Catheter Line                 Introducer right internal jugular -- days         Percutaneous Central Line Insertion/Assessment - triple lumen  right internal jugular -- days         Trialysis (Dialysis) Catheter left internal jugular -- days         Percutaneous Central Line Insertion/Assessment - double lumen  01/14/20 0725 6 days          Drain                 Chest Tube 01/14/20 1017 1 Left Pleural 19 Fr. 6 days         Urethral Catheter 01/14/20 0720 Non-latex;Temperature probe;Straight-tip 14 Fr. 6 days         Y Chest Tube 1 and 2 01/14/20 1018 1 Anterior Mediastinal 19 Fr. 2 Anterior Mediastinal 19 Fr. 6 days         NG/OG Tube 01/16/20 1446 orogastric 3 days          Airway                 Airway - Non-Surgical 01/16/20 1442 Endotracheal Tube 3 days          Arterial Line                 Arterial Line 01/16/20 2300 Right Radial 3 days          Line                 Pacer Wires 01/14/20 1009 6 days                Significant Labs:    CBC/Anemia Profile:  Recent Labs   Lab 01/18/20  6928   01/19/20 0300 01/19/20 0358 01/20/20 0300 01/20/20 0359   WBC 12.24  --  10.17  --  9.73  --    HGB 8.3*  --  7.8*  --  8.0*  --    HCT 26.5*   < > 25.5* 21* 26.7* 24*   *  --  144*  --  146*  --    MCV 95  --  96  --  98  --    RDW 17.3*  --  17.5*  --  17.4*  --     < > = values in this interval not displayed.        Chemistries:  Recent Labs   Lab 01/18/20 2200 01/19/20 0300 01/19/20 1408 01/19/20 2200 01/20/20 0300     141  141 141  141  141  141  141 143  143 142 141  141   K 4.6  4.6  4.6 4.5  4.5  4.5  4.5  4.5 4.2  4.2 4.8 4.8  4.8     106  106 107  107  107  107  107 108  108 106 105  105   CO2 25  25  25 26  26  26  26  26 27  27 25 27  27   BUN 3*  3*  3* 3*  3*  3*  3*  3* 2*  2* 2* 3*  3*   CREATININE 0.7  0.7  0.7 0.6  0.6  0.6  0.6  0.6 0.6  0.6 0.7 0.7  0.7   CALCIUM 8.2*  8.2*  8.2* 8.1*  8.1*  8.1*  8.1*  8.1* 8.5*  8.5* 8.7 8.7  8.7   ALBUMIN 2.7*  2.7*  2.7* 2.5*  2.5*  2.5* 2.5*  2.5* 2.6* 2.6*  2.6*   PROT 6.2 6.0  --   --  6.8   BILITOT 2.9* 2.7*  --   --  2.7*   ALKPHOS 130 126  --   --  163*   * 633*  --   --  547*   * 234*  --   --  189*   MG 2.1  2.1 2.0  2.0 1.9  1.9 2.1 2.0   PHOS 4.3  4.3 2.5*  2.5* 1.3*  1.3* 3.7 2.4*       ABGs:   Recent Labs   Lab 01/20/20  0800   PH 7.406   PCO2 39.1   HCO3 24.5   POCSATURATED 99   BE 0     Coagulation:   Recent Labs   Lab 01/20/20  0300   INR 0.9     Lactic Acid: No results for input(s): LACTATE in the last 48 hours.    Significant Imaging:  I have reviewed all pertinent imaging results/findings within the past 24 hours.    Assessment/Plan:     Acute on chronic combined systolic (congestive) and diastolic (congestive) heart failure  Neuro:   - sedated with propofol/precedex     Pulmonary:   - Worsening respiratory distress, intubated 1/16  - Wean vent settings  - Daily CXR  - ABG prn  - possibly try SBT today     Cardiac:  - Epi @ 0.005  -  Echo 1/17:            Mildly decreased left ventricular systolic function. The estimated ejection fraction is 50%.  · Concentric left ventricular remodeling.  · Local segmental wall motion abnormalities.  · Septal wall has abnormal motion.  · Grade I (mild) left ventricular diastolic dysfunction consistent with impaired relaxation.  · Normal right ventricular systolic function.  · Severe tricuspid regurgitation.   -Pacer turned off for time being    Renal:   - CRRT on, tolerating well  - UF @ 350  - BUN/Cr ok, improving  - Kerns in place for close monitoring of UOP     Infectious Disease:   - Afebrile, WBC 9.7  - Antibiotics: Perioperative     Hematology/Oncology:  - monitor H/H, today 8.0/26.7  - Transfused pRBC x2 1/16, appropriate response  - Continue to monitor CT output     Endocrine:  - Endocrine consulted, keep blood glucose goals 140-180     Fluids/Electrolytes/Nutrition/GI:   - D5 NS 20 KCl @ 5  - Trend CMP  - LFTs trending down  - Replace Lytes PRN   - NPO  - Scopolamine patch, PRN Zofran     Prophylaxis:  - GI: protonix daily     Dispo:  Continue ICU care, continue close monitoring of ABGs/wean vent.   Primary team: CTS      Critical care was time spent personally by me on the following activities: development of treatment plan with patient or surrogate and bedside caregivers, discussions with consultants, evaluation of patient's response to treatment, examination of patient, ordering and performing treatments and interventions, ordering and review of laboratory studies, ordering and review of radiographic studies, pulse oximetry, re-evaluation of patient's condition.  This critical care time did not overlap with that of any other provider or involve time for any procedures.     Negrita Cordova MD  Critical Care - Surgery  [facility]

## 2020-01-20 NOTE — SUBJECTIVE & OBJECTIVE
Interval History/Significant Events: NAEON. Paralysis discontinued. Moving all fours spontaneously. Following commands.     Follow-up For: Procedure(s) (LRB):  CORONARY ARTERY BYPASS GRAFT (CABG) x 1     Off Pump (N/A)    Post-Operative Day: 6 Days Post-Op    Objective:     Vital Signs (Most Recent):  Temp: 99.7 °F (37.6 °C) (01/20/20 1100)  Pulse: 79 (01/20/20 1100)  Resp: 17 (01/20/20 0918)  BP: 127/67 (01/20/20 1100)  SpO2: 100 % (01/20/20 1100) Vital Signs (24h Range):  Temp:  [97.6 °F (36.4 °C)-99.7 °F (37.6 °C)] 99.7 °F (37.6 °C)  Pulse:  [68-86] 79  Resp:  [17-24] 17  SpO2:  [67 %-100 %] 100 %  BP: (108-163)/(56-73) 127/67  Arterial Line BP: ()/() 107/48     Weight: 107.3 kg (236 lb 8.9 oz)  Body mass index is 40.6 kg/m².      Intake/Output Summary (Last 24 hours) at 1/20/2020 1122  Last data filed at 1/20/2020 1100  Gross per 24 hour   Intake 6138.8 ml   Output 8576 ml   Net -2437.2 ml       Physical Exam   Constitutional: She appears well-developed and well-nourished. She is sedated and intubated.   HENT:   Head: Normocephalic and atraumatic.   Eyes: Pupils are equal, round, and reactive to light.   Neck: Normal range of motion. Neck supple.   Bilateral CVC, dressings CDI   Cardiovascular: Normal rate and regular rhythm.   Pulmonary/Chest: She is intubated.   Mechanically ventilated   Abdominal: Soft. She exhibits no distension. There is no rebound.   Musculoskeletal: Normal range of motion. She exhibits edema. She exhibits no deformity.   Neurological:   sedated   Skin: Skin is warm and dry. No rash noted. No erythema.       Vents:  Vent Mode: Spont (01/20/20 0918)  Ventilator Initiated: Yes (01/16/20 1448)  Set Rate: 22 bmp (01/20/20 0500)  Vt Set: 325 mL (01/19/20 2342)  Pressure Support: 12 cmH20 (01/20/20 0918)  PEEP/CPAP: 6 cmH20 (01/20/20 0918)  Oxygen Concentration (%): 40 (01/20/20 1100)  Peak Airway Pressure: 18 cmH2O (01/20/20 0918)  Plateau Pressure: 17 cmH20 (01/20/20 0918)  Total  Ve: 7.28 mL (01/20/20 0918)  F/VT Ratio<105 (RSBI): (!) 41.46 (01/20/20 0918)    Lines/Drains/Airways     Central Venous Catheter Line                 Introducer right internal jugular -- days         Percutaneous Central Line Insertion/Assessment - triple lumen  right internal jugular -- days         Trialysis (Dialysis) Catheter left internal jugular -- days         Percutaneous Central Line Insertion/Assessment - double lumen  01/14/20 0725 6 days          Drain                 Chest Tube 01/14/20 1017 1 Left Pleural 19 Fr. 6 days         Urethral Catheter 01/14/20 0720 Non-latex;Temperature probe;Straight-tip 14 Fr. 6 days         Y Chest Tube 1 and 2 01/14/20 1018 1 Anterior Mediastinal 19 Fr. 2 Anterior Mediastinal 19 Fr. 6 days         NG/OG Tube 01/16/20 1446 orogastric 3 days          Airway                 Airway - Non-Surgical 01/16/20 1442 Endotracheal Tube 3 days          Arterial Line                 Arterial Line 01/16/20 2300 Right Radial 3 days          Line                 Pacer Wires 01/14/20 1009 6 days                Significant Labs:    CBC/Anemia Profile:  Recent Labs   Lab 01/18/20  1525  01/19/20  0300 01/19/20  0358 01/20/20  0300 01/20/20  0359   WBC 12.24  --  10.17  --  9.73  --    HGB 8.3*  --  7.8*  --  8.0*  --    HCT 26.5*   < > 25.5* 21* 26.7* 24*   *  --  144*  --  146*  --    MCV 95  --  96  --  98  --    RDW 17.3*  --  17.5*  --  17.4*  --     < > = values in this interval not displayed.        Chemistries:  Recent Labs   Lab 01/18/20  2200 01/19/20  0300 01/19/20  1408 01/19/20  2200 01/20/20  0300     141  141 141  141  141  141  141 143  143 142 141  141   K 4.6  4.6  4.6 4.5  4.5  4.5  4.5  4.5 4.2  4.2 4.8 4.8  4.8     106  106 107  107  107  107  107 108  108 106 105  105   CO2 25  25  25 26  26  26  26  26 27  27 25 27  27   BUN 3*  3*  3* 3*  3*  3*  3*  3* 2*  2* 2* 3*  3*   CREATININE 0.7  0.7  0.7 0.6  0.6   0.6  0.6  0.6 0.6  0.6 0.7 0.7  0.7   CALCIUM 8.2*  8.2*  8.2* 8.1*  8.1*  8.1*  8.1*  8.1* 8.5*  8.5* 8.7 8.7  8.7   ALBUMIN 2.7*  2.7*  2.7* 2.5*  2.5*  2.5* 2.5*  2.5* 2.6* 2.6*  2.6*   PROT 6.2 6.0  --   --  6.8   BILITOT 2.9* 2.7*  --   --  2.7*   ALKPHOS 130 126  --   --  163*   * 633*  --   --  547*   * 234*  --   --  189*   MG 2.1  2.1 2.0  2.0 1.9  1.9 2.1 2.0   PHOS 4.3  4.3 2.5*  2.5* 1.3*  1.3* 3.7 2.4*       ABGs:   Recent Labs   Lab 01/20/20  0800   PH 7.406   PCO2 39.1   HCO3 24.5   POCSATURATED 99   BE 0     Coagulation:   Recent Labs   Lab 01/20/20  0300   INR 0.9     Lactic Acid: No results for input(s): LACTATE in the last 48 hours.    Significant Imaging:  I have reviewed all pertinent imaging results/findings within the past 24 hours.

## 2020-01-20 NOTE — PLAN OF CARE
Problem: Adult Inpatient Plan of Care  Goal: Plan of Care Review  Outcome: Ongoing, Progressing  Pt remains intubated and sedated settings SIMV 40% FiO2, 7 peep, 10 of pressure support, 325 Vt rate 22. Pt awakens and follows commands  Gtts: dobutamine at 5 mcg/kg/min, propofol at 15 mcg/kg/min, fentanyl off, epi at 0.005mcg/kg/min. Pt tolerating CRRT with . CVP 8, 8, 7. Bilateral soft wrist restraints monitored per policy. No output from chest tubes. See flowsheets for assessments and intake and output. Will continue to wean gtts as tolerated and continue to run CRRT as ordered.  Plan of care reviewed with patients spouse. Will continue to monitor.

## 2020-01-20 NOTE — SUBJECTIVE & OBJECTIVE
"Interval HPI:   Overnight events: Remains intubated and sedated in SICU. BG below goal ranges on current SQ insulin regimen.   Eating:   NPO  Nausea: No  Hypoglycemia and intervention: No  Fever: No  TPN and/or TF: No  If yes, type of TF/TPN and rate: none    BP (!) 130/59 (BP Location: Right arm, Patient Position: Lying)   Pulse 78   Temp 99 °F (37.2 °C) (Oral)   Resp (!) 24   Ht 5' 4" (1.626 m)   Wt 107.3 kg (236 lb 8.9 oz)   LMP 09/30/2015 (Exact Date)   SpO2 100%   Breastfeeding? No   BMI 40.60 kg/m²     Labs Reviewed and Include    Recent Labs   Lab 01/20/20  0300   GLU 94  94   CALCIUM 8.7  8.7   ALBUMIN 2.6*  2.6*   PROT 6.8     141   K 4.8  4.8   CO2 27  27     105   BUN 3*  3*   CREATININE 0.7  0.7   ALKPHOS 163*   *   *   BILITOT 2.7*     Lab Results   Component Value Date    WBC 9.73 01/20/2020    HGB 8.0 (L) 01/20/2020    HCT 24 (L) 01/20/2020    MCV 98 01/20/2020     (L) 01/20/2020     No results for input(s): TSH, FREET4 in the last 168 hours.  Lab Results   Component Value Date    HGBA1C 9.7 (H) 01/03/2020       Nutritional status:   Body mass index is 40.6 kg/m².  Lab Results   Component Value Date    ALBUMIN 2.6 (L) 01/20/2020    ALBUMIN 2.6 (L) 01/20/2020    ALBUMIN 2.6 (L) 01/19/2020     No results found for: PREALBUMIN    Estimated Creatinine Clearance: 111.1 mL/min (based on SCr of 0.7 mg/dL).    Accu-Checks  Recent Labs     01/19/20  0314 01/19/20  0751 01/19/20  1109 01/19/20  1152 01/19/20  1529 01/19/20  1531 01/19/20  1708 01/19/20  2153 01/20/20  0303 01/20/20  0800   POCTGLUCOSE 117* 122* 86 96 70 79 76 121* 95 100       Current Medications and/or Treatments Impacting Glycemic Control  Immunotherapy:    Immunosuppressants     None        Steroids:   Hormones (From admission, onward)    Start     Stop Route Frequency Ordered    01/14/20 1230  vasopressin (PITRESSIN) 0.2 Units/mL in dextrose 5 % 100 mL infusion      -- IV Continuous " 01/14/20 1129        Pressors:    Autonomic Drugs (From admission, onward)    Start     Stop Route Frequency Ordered    01/17/20 0915  cisatracurium (NIMBEX) 200 mg in dextrose 5 % 100 mL infusion     Question Answer Comment   Bolus over 1 minute (in mg): 10    Begin at (in mcg/kg/min): 1    Titrate by: (in mcg/kg/min) 0.05    Titrate interval: (in minutes) 15    To maintain a train-of-four of (TOF_/4): 4/4    Maximum dose: (in mcg/kg/min) 3        -- IV Continuous 01/17/20 0913    01/16/20 1430  succinylcholine (ANECTINE) 20 mg/mL injection     Note to Pharmacy:  Created by cabinet override    01/17 0244   01/16/20 1430    01/14/20 1853  rocuronium 10 mg/mL injection     Note to Pharmacy:  Created by cabinet override    01/15 0659   01/14/20 1853    01/14/20 1230  EPINEPHrine (ADRENALIN) 5 mg in sodium chloride 0.9% 250 mL infusion     Question Answer Comment   Titrate by: (in mcg/kg/min) 0.01    Titrate interval: (in minutes) 5    Titrate to maintain: (SBP or MAP or Cardiac Index) SBP    Maximum dose: (in mcg/kg/min) 2        -- IV Continuous 01/14/20 1129    01/14/20 1230  norepinephrine 4 mg in dextrose 5% 250 mL infusion (premix) (titrating)     Question Answer Comment   Titrate by: (in mcg/kg/min) 0.05    Titrate interval: (in minutes) 5    Titrate to maintain: (MAP or SBP) MAP    Greater than: (in mmHg) 65    Maximum dose: (in mcg/kg/min) 3        -- IV Continuous 01/14/20 1129        Hyperglycemia/Diabetes Medications:   Antihyperglycemics (From admission, onward)    Start     Stop Route Frequency Ordered    01/21/20 0900  insulin detemir U-100 pen 10 Units      -- SubQ Daily 01/20/20 0841    01/15/20 0946  insulin aspart U-100 pen 0-5 Units      -- SubQ Every 4 hours PRN 01/15/20 0847

## 2020-01-21 LAB
ALBUMIN SERPL BCP-MCNC: 2.4 G/DL (ref 3.5–5.2)
ALLENS TEST: ABNORMAL
ALLENS TEST: NORMAL
ALLENS TEST: NORMAL
ALP SERPL-CCNC: 187 U/L (ref 55–135)
ALT SERPL W/O P-5'-P-CCNC: 385 U/L (ref 10–44)
ANION GAP SERPL CALC-SCNC: 10 MMOL/L (ref 8–16)
ANION GAP SERPL CALC-SCNC: 10 MMOL/L (ref 8–16)
ANION GAP SERPL CALC-SCNC: 11 MMOL/L (ref 8–16)
ANISOCYTOSIS BLD QL SMEAR: SLIGHT
AST SERPL-CCNC: 131 U/L (ref 10–40)
BASO STIPL BLD QL SMEAR: ABNORMAL
BASOPHILS # BLD AUTO: 0.03 K/UL (ref 0–0.2)
BASOPHILS # BLD AUTO: 0.03 K/UL (ref 0–0.2)
BASOPHILS NFR BLD: 0.2 % (ref 0–1.9)
BASOPHILS NFR BLD: 0.2 % (ref 0–1.9)
BILIRUB SERPL-MCNC: 2.7 MG/DL (ref 0.1–1)
BLD PROD TYP BPU: NORMAL
BLOOD UNIT EXPIRATION DATE: NORMAL
BLOOD UNIT TYPE CODE: 5100
BLOOD UNIT TYPE: NORMAL
BUN SERPL-MCNC: 2 MG/DL (ref 6–20)
CALCIUM SERPL-MCNC: 8.5 MG/DL (ref 8.7–10.5)
CALCIUM SERPL-MCNC: 8.5 MG/DL (ref 8.7–10.5)
CALCIUM SERPL-MCNC: 8.7 MG/DL (ref 8.7–10.5)
CHLORIDE SERPL-SCNC: 106 MMOL/L (ref 95–110)
CO2 SERPL-SCNC: 24 MMOL/L (ref 23–29)
CODING SYSTEM: NORMAL
CREAT SERPL-MCNC: 0.6 MG/DL (ref 0.5–1.4)
CREAT SERPL-MCNC: 0.6 MG/DL (ref 0.5–1.4)
CREAT SERPL-MCNC: 0.7 MG/DL (ref 0.5–1.4)
DELSYS: ABNORMAL
DELSYS: NORMAL
DELSYS: NORMAL
DIFFERENTIAL METHOD: ABNORMAL
DIFFERENTIAL METHOD: ABNORMAL
DISPENSE STATUS: NORMAL
EOSINOPHIL # BLD AUTO: 0.1 K/UL (ref 0–0.5)
EOSINOPHIL # BLD AUTO: 0.1 K/UL (ref 0–0.5)
EOSINOPHIL NFR BLD: 0.8 % (ref 0–8)
EOSINOPHIL NFR BLD: 1.1 % (ref 0–8)
ERYTHROCYTE [DISTWIDTH] IN BLOOD BY AUTOMATED COUNT: 16.6 % (ref 11.5–14.5)
ERYTHROCYTE [DISTWIDTH] IN BLOOD BY AUTOMATED COUNT: 17.2 % (ref 11.5–14.5)
ERYTHROCYTE [SEDIMENTATION RATE] IN BLOOD BY WESTERGREN METHOD: 22 MM/H
ERYTHROCYTE [SEDIMENTATION RATE] IN BLOOD BY WESTERGREN METHOD: 22 MM/H
EST. GFR  (AFRICAN AMERICAN): >60 ML/MIN/1.73 M^2
EST. GFR  (NON AFRICAN AMERICAN): >60 ML/MIN/1.73 M^2
FIO2: 40
FLOW: 4
GIANT PLATELETS BLD QL SMEAR: PRESENT
GLUCOSE SERPL-MCNC: 102 MG/DL (ref 70–110)
GLUCOSE SERPL-MCNC: 98 MG/DL (ref 70–110)
GLUCOSE SERPL-MCNC: 98 MG/DL (ref 70–110)
HCO3 UR-SCNC: 23.2 MMOL/L (ref 24–28)
HCO3 UR-SCNC: 23.8 MMOL/L (ref 24–28)
HCO3 UR-SCNC: 24.6 MMOL/L (ref 24–28)
HCO3 UR-SCNC: 24.7 MMOL/L (ref 24–28)
HCO3 UR-SCNC: 25.2 MMOL/L (ref 24–28)
HCT VFR BLD AUTO: 25.3 % (ref 37–48.5)
HCT VFR BLD AUTO: 27.7 % (ref 37–48.5)
HCT VFR BLD CALC: 23 %PCV (ref 36–54)
HGB BLD-MCNC: 7.6 G/DL (ref 12–16)
HGB BLD-MCNC: 8.5 G/DL (ref 12–16)
IMM GRANULOCYTES # BLD AUTO: 0.06 K/UL (ref 0–0.04)
IMM GRANULOCYTES # BLD AUTO: 0.07 K/UL (ref 0–0.04)
IMM GRANULOCYTES NFR BLD AUTO: 0.5 % (ref 0–0.5)
IMM GRANULOCYTES NFR BLD AUTO: 0.6 % (ref 0–0.5)
INR PPP: 0.9 (ref 0.8–1.2)
LDH SERPL L TO P-CCNC: 0.54 MMOL/L (ref 0.36–1.25)
LDH SERPL L TO P-CCNC: 0.56 MMOL/L (ref 0.36–1.25)
LDH SERPL L TO P-CCNC: 0.61 MMOL/L (ref 0.36–1.25)
LDH SERPL L TO P-CCNC: 0.61 MMOL/L (ref 0.36–1.25)
LYMPHOCYTES # BLD AUTO: 1.1 K/UL (ref 1–4.8)
LYMPHOCYTES # BLD AUTO: 1.4 K/UL (ref 1–4.8)
LYMPHOCYTES NFR BLD: 11.5 % (ref 18–48)
LYMPHOCYTES NFR BLD: 9.3 % (ref 18–48)
MAGNESIUM SERPL-MCNC: 1.9 MG/DL (ref 1.6–2.6)
MAGNESIUM SERPL-MCNC: 2.1 MG/DL (ref 1.6–2.6)
MCH RBC QN AUTO: 29.1 PG (ref 27–31)
MCH RBC QN AUTO: 29.5 PG (ref 27–31)
MCHC RBC AUTO-ENTMCNC: 30 G/DL (ref 32–36)
MCHC RBC AUTO-ENTMCNC: 30.7 G/DL (ref 32–36)
MCV RBC AUTO: 96 FL (ref 82–98)
MCV RBC AUTO: 97 FL (ref 82–98)
MODE: ABNORMAL
MODE: NORMAL
MODE: NORMAL
MONOCYTES # BLD AUTO: 2 K/UL (ref 0.3–1)
MONOCYTES # BLD AUTO: 2.1 K/UL (ref 0.3–1)
MONOCYTES NFR BLD: 16.5 % (ref 4–15)
MONOCYTES NFR BLD: 16.9 % (ref 4–15)
NEUTROPHILS # BLD AUTO: 8.6 K/UL (ref 1.8–7.7)
NEUTROPHILS # BLD AUTO: 8.8 K/UL (ref 1.8–7.7)
NEUTROPHILS NFR BLD: 70.2 % (ref 38–73)
NEUTROPHILS NFR BLD: 72.2 % (ref 38–73)
NRBC BLD-RTO: 0 /100 WBC
NRBC BLD-RTO: 0 /100 WBC
OVALOCYTES BLD QL SMEAR: ABNORMAL
PCO2 BLDA: 30.8 MMHG (ref 35–45)
PCO2 BLDA: 31.8 MMHG (ref 35–45)
PCO2 BLDA: 34.6 MMHG (ref 35–45)
PCO2 BLDA: 35 MMHG (ref 35–45)
PCO2 BLDA: 38.8 MMHG (ref 35–45)
PEEP: 6
PH SMN: 7.41 [PH] (ref 7.35–7.45)
PH SMN: 7.45 [PH] (ref 7.35–7.45)
PH SMN: 7.46 [PH] (ref 7.35–7.45)
PH SMN: 7.49 [PH] (ref 7.35–7.45)
PH SMN: 7.5 [PH] (ref 7.35–7.45)
PHOSPHATE SERPL-MCNC: 2.3 MG/DL (ref 2.7–4.5)
PHOSPHATE SERPL-MCNC: 2.4 MG/DL (ref 2.7–4.5)
PLATELET # BLD AUTO: 141 K/UL (ref 150–350)
PLATELET # BLD AUTO: 153 K/UL (ref 150–350)
PLATELET BLD QL SMEAR: ABNORMAL
PMV BLD AUTO: 10.8 FL (ref 9.2–12.9)
PMV BLD AUTO: 11.3 FL (ref 9.2–12.9)
PO2 BLDA: 60 MMHG (ref 80–100)
PO2 BLDA: 80 MMHG (ref 80–100)
PO2 BLDA: 85 MMHG (ref 80–100)
PO2 BLDA: 87 MMHG (ref 80–100)
PO2 BLDA: 91 MMHG (ref 80–100)
POC BE: 0 MMOL/L
POC BE: 1 MMOL/L
POC BE: 1 MMOL/L
POC IONIZED CALCIUM: 1.11 MMOL/L (ref 1.06–1.42)
POC SATURATED O2: 93 % (ref 95–100)
POC SATURATED O2: 97 % (ref 95–100)
POC SATURATED O2: 98 % (ref 95–100)
POC TCO2: 24 MMOL/L (ref 23–27)
POC TCO2: 25 MMOL/L (ref 23–27)
POC TCO2: 26 MMOL/L (ref 23–27)
POCT GLUCOSE: 102 MG/DL (ref 70–110)
POCT GLUCOSE: 103 MG/DL (ref 70–110)
POCT GLUCOSE: 78 MG/DL (ref 70–110)
POCT GLUCOSE: 88 MG/DL (ref 70–110)
POCT GLUCOSE: 95 MG/DL (ref 70–110)
POCT GLUCOSE: 97 MG/DL (ref 70–110)
POIKILOCYTOSIS BLD QL SMEAR: SLIGHT
POLYCHROMASIA BLD QL SMEAR: ABNORMAL
POTASSIUM BLD-SCNC: 4.2 MMOL/L (ref 3.5–5.1)
POTASSIUM SERPL-SCNC: 4.3 MMOL/L (ref 3.5–5.1)
PROT SERPL-MCNC: 6.7 G/DL (ref 6–8.4)
PROTHROMBIN TIME: 9.9 SEC (ref 9–12.5)
PS: 10
PS: 10
PS: 8
RBC # BLD AUTO: 2.61 M/UL (ref 4–5.4)
RBC # BLD AUTO: 2.88 M/UL (ref 4–5.4)
SAMPLE: ABNORMAL
SAMPLE: NORMAL
SAMPLE: NORMAL
SITE: ABNORMAL
SITE: NORMAL
SITE: NORMAL
SODIUM BLD-SCNC: 140 MMOL/L (ref 136–145)
SODIUM SERPL-SCNC: 140 MMOL/L (ref 136–145)
SODIUM SERPL-SCNC: 140 MMOL/L (ref 136–145)
SODIUM SERPL-SCNC: 141 MMOL/L (ref 136–145)
SP02: 100
TARGETS BLD QL SMEAR: ABNORMAL
TRANS ERYTHROCYTES VOL PATIENT: NORMAL ML
WBC # BLD AUTO: 12.22 K/UL (ref 3.9–12.7)
WBC # BLD AUTO: 12.31 K/UL (ref 3.9–12.7)

## 2020-01-21 PROCEDURE — 84132 ASSAY OF SERUM POTASSIUM: CPT

## 2020-01-21 PROCEDURE — 90945 DIALYSIS ONE EVALUATION: CPT

## 2020-01-21 PROCEDURE — 27000221 HC OXYGEN, UP TO 24 HOURS

## 2020-01-21 PROCEDURE — 36410 VNPNXR 3YR/> PHY/QHP DX/THER: CPT

## 2020-01-21 PROCEDURE — 63600175 PHARM REV CODE 636 W HCPCS: Performed by: STUDENT IN AN ORGANIZED HEALTH CARE EDUCATION/TRAINING PROGRAM

## 2020-01-21 PROCEDURE — 83735 ASSAY OF MAGNESIUM: CPT | Mod: 91

## 2020-01-21 PROCEDURE — 83735 ASSAY OF MAGNESIUM: CPT

## 2020-01-21 PROCEDURE — C1751 CATH, INF, PER/CENT/MIDLINE: HCPCS

## 2020-01-21 PROCEDURE — 25000003 PHARM REV CODE 250: Performed by: NURSE PRACTITIONER

## 2020-01-21 PROCEDURE — 80053 COMPREHEN METABOLIC PANEL: CPT

## 2020-01-21 PROCEDURE — 85610 PROTHROMBIN TIME: CPT

## 2020-01-21 PROCEDURE — 84295 ASSAY OF SERUM SODIUM: CPT

## 2020-01-21 PROCEDURE — 83605 ASSAY OF LACTIC ACID: CPT

## 2020-01-21 PROCEDURE — 99900035 HC TECH TIME PER 15 MIN (STAT)

## 2020-01-21 PROCEDURE — 25000003 PHARM REV CODE 250: Performed by: SURGERY

## 2020-01-21 PROCEDURE — 82330 ASSAY OF CALCIUM: CPT

## 2020-01-21 PROCEDURE — 63600175 PHARM REV CODE 636 W HCPCS: Performed by: SURGERY

## 2020-01-21 PROCEDURE — 63600175 PHARM REV CODE 636 W HCPCS: Performed by: NURSE PRACTITIONER

## 2020-01-21 PROCEDURE — 82803 BLOOD GASES ANY COMBINATION: CPT

## 2020-01-21 PROCEDURE — 94761 N-INVAS EAR/PLS OXIMETRY MLT: CPT

## 2020-01-21 PROCEDURE — C9113 INJ PANTOPRAZOLE SODIUM, VIA: HCPCS | Performed by: STUDENT IN AN ORGANIZED HEALTH CARE EDUCATION/TRAINING PROGRAM

## 2020-01-21 PROCEDURE — 90945 DIALYSIS ONE EVALUATION: CPT | Mod: ,,, | Performed by: NURSE PRACTITIONER

## 2020-01-21 PROCEDURE — 25000003 PHARM REV CODE 250: Performed by: STUDENT IN AN ORGANIZED HEALTH CARE EDUCATION/TRAINING PROGRAM

## 2020-01-21 PROCEDURE — 80069 RENAL FUNCTION PANEL: CPT | Mod: 91

## 2020-01-21 PROCEDURE — 80100008 HC CRRT DAILY MAINTENANCE

## 2020-01-21 PROCEDURE — 76937 US GUIDE VASCULAR ACCESS: CPT

## 2020-01-21 PROCEDURE — 97803 MED NUTRITION INDIV SUBSEQ: CPT

## 2020-01-21 PROCEDURE — P9021 RED BLOOD CELLS UNIT: HCPCS

## 2020-01-21 PROCEDURE — 85014 HEMATOCRIT: CPT

## 2020-01-21 PROCEDURE — 99900017 HC EXTUBATION W/PARAMETERS (STAT)

## 2020-01-21 PROCEDURE — 37799 UNLISTED PX VASCULAR SURGERY: CPT

## 2020-01-21 PROCEDURE — 80069 RENAL FUNCTION PANEL: CPT

## 2020-01-21 PROCEDURE — 99900026 HC AIRWAY MAINTENANCE (STAT)

## 2020-01-21 PROCEDURE — 63600175 PHARM REV CODE 636 W HCPCS

## 2020-01-21 PROCEDURE — 90945 PR DIALYSIS, NOT HEMO, 1 EVAL: ICD-10-PCS | Mod: ,,, | Performed by: NURSE PRACTITIONER

## 2020-01-21 PROCEDURE — 20000000 HC ICU ROOM

## 2020-01-21 PROCEDURE — 85025 COMPLETE CBC W/AUTO DIFF WBC: CPT

## 2020-01-21 RX ORDER — DEXMEDETOMIDINE HYDROCHLORIDE 4 UG/ML
0.2 INJECTION, SOLUTION INTRAVENOUS CONTINUOUS
Status: DISCONTINUED | OUTPATIENT
Start: 2020-01-21 | End: 2020-01-26

## 2020-01-21 RX ORDER — FENTANYL CITRATE 50 UG/ML
25 INJECTION, SOLUTION INTRAMUSCULAR; INTRAVENOUS ONCE
Status: COMPLETED | OUTPATIENT
Start: 2020-01-21 | End: 2020-01-21

## 2020-01-21 RX ORDER — MAGNESIUM SULFATE HEPTAHYDRATE 40 MG/ML
2 INJECTION, SOLUTION INTRAVENOUS
Status: DISCONTINUED | OUTPATIENT
Start: 2020-01-21 | End: 2020-01-22

## 2020-01-21 RX ORDER — FENTANYL CITRATE 50 UG/ML
INJECTION, SOLUTION INTRAMUSCULAR; INTRAVENOUS
Status: COMPLETED
Start: 2020-01-21 | End: 2020-01-21

## 2020-01-21 RX ORDER — HYDROCODONE BITARTRATE AND ACETAMINOPHEN 500; 5 MG/1; MG/1
TABLET ORAL CONTINUOUS
Status: DISCONTINUED | OUTPATIENT
Start: 2020-01-21 | End: 2020-01-22

## 2020-01-21 RX ORDER — HYDRALAZINE HYDROCHLORIDE 20 MG/ML
10 INJECTION INTRAMUSCULAR; INTRAVENOUS ONCE
Status: COMPLETED | OUTPATIENT
Start: 2020-01-21 | End: 2020-01-21

## 2020-01-21 RX ORDER — HYDROCODONE BITARTRATE AND ACETAMINOPHEN 500; 5 MG/1; MG/1
TABLET ORAL
Status: DISCONTINUED | OUTPATIENT
Start: 2020-01-21 | End: 2020-01-22

## 2020-01-21 RX ADMIN — DEXMEDETOMIDINE HYDROCHLORIDE 0.6 MCG/KG/HR: 4 INJECTION, SOLUTION INTRAVENOUS at 12:01

## 2020-01-21 RX ADMIN — MAGNESIUM SULFATE IN WATER 2 G: 40 INJECTION, SOLUTION INTRAVENOUS at 06:01

## 2020-01-21 RX ADMIN — PANTOPRAZOLE SODIUM 40 MG: 40 INJECTION, POWDER, FOR SOLUTION INTRAVENOUS at 10:01

## 2020-01-21 RX ADMIN — SODIUM PHOSPHATE, MONOBASIC, MONOHYDRATE 30 MMOL: 276; 142 INJECTION, SOLUTION INTRAVENOUS at 06:01

## 2020-01-21 RX ADMIN — HYDROMORPHONE HYDROCHLORIDE 0.2 MG: 1 INJECTION, SOLUTION INTRAMUSCULAR; INTRAVENOUS; SUBCUTANEOUS at 06:01

## 2020-01-21 RX ADMIN — HEPARIN SODIUM 5000 UNITS: 5000 INJECTION, SOLUTION INTRAVENOUS; SUBCUTANEOUS at 03:01

## 2020-01-21 RX ADMIN — HYDRALAZINE HYDROCHLORIDE 10 MG: 20 INJECTION INTRAMUSCULAR; INTRAVENOUS at 02:01

## 2020-01-21 RX ADMIN — HYDROMORPHONE HYDROCHLORIDE 0.2 MG: 1 INJECTION, SOLUTION INTRAMUSCULAR; INTRAVENOUS; SUBCUTANEOUS at 01:01

## 2020-01-21 RX ADMIN — SODIUM CHLORIDE: 0.9 INJECTION, SOLUTION INTRAVENOUS at 06:01

## 2020-01-21 RX ADMIN — HYDROMORPHONE HYDROCHLORIDE 0.2 MG: 1 INJECTION, SOLUTION INTRAMUSCULAR; INTRAVENOUS; SUBCUTANEOUS at 03:01

## 2020-01-21 RX ADMIN — HEPARIN SODIUM 5000 UNITS: 5000 INJECTION, SOLUTION INTRAVENOUS; SUBCUTANEOUS at 10:01

## 2020-01-21 RX ADMIN — POLYETHYLENE GLYCOL 3350 17 G: 17 POWDER, FOR SOLUTION ORAL at 10:01

## 2020-01-21 RX ADMIN — ASPIRIN 81 MG CHEWABLE TABLET 81 MG: 81 TABLET CHEWABLE at 10:01

## 2020-01-21 RX ADMIN — HYDROMORPHONE HYDROCHLORIDE 0.2 MG: 1 INJECTION, SOLUTION INTRAMUSCULAR; INTRAVENOUS; SUBCUTANEOUS at 10:01

## 2020-01-21 RX ADMIN — SODIUM CHLORIDE: 0.9 INJECTION, SOLUTION INTRAVENOUS at 07:01

## 2020-01-21 RX ADMIN — FENTANYL CITRATE 25 MCG: 50 INJECTION, SOLUTION INTRAMUSCULAR; INTRAVENOUS at 03:01

## 2020-01-21 RX ADMIN — DOBUTAMINE IN DEXTROSE 5 MCG/KG/MIN: 200 INJECTION, SOLUTION INTRAVENOUS at 05:01

## 2020-01-21 RX ADMIN — DEXMEDETOMIDINE HYDROCHLORIDE 1.4 MCG/KG/HR: 4 INJECTION, SOLUTION INTRAVENOUS at 03:01

## 2020-01-21 RX ADMIN — HEPARIN SODIUM 5000 UNITS: 5000 INJECTION, SOLUTION INTRAVENOUS; SUBCUTANEOUS at 05:01

## 2020-01-21 RX ADMIN — SCOPALAMINE 1 PATCH: 1 PATCH, EXTENDED RELEASE TRANSDERMAL at 10:01

## 2020-01-21 RX ADMIN — SODIUM PHOSPHATE, MONOBASIC, MONOHYDRATE 20.01 MMOL: 276; 142 INJECTION, SOLUTION INTRAVENOUS at 12:01

## 2020-01-21 RX ADMIN — CLOPIDOGREL BISULFATE 75 MG: 75 TABLET, FILM COATED ORAL at 10:01

## 2020-01-21 RX ADMIN — DOBUTAMINE IN DEXTROSE 5 MCG/KG/MIN: 200 INJECTION, SOLUTION INTRAVENOUS at 01:01

## 2020-01-21 NOTE — RESPIRATORY THERAPY
RT called by Ena BRIGHT. Patient suddenly woke in a panic, respiratory rate in the 40s. Placed pt back on SIMV with documented settings. RN gave medication to help calm patient. Patient now resting and at baseline. Will continue to monitor.

## 2020-01-21 NOTE — PLAN OF CARE
Plan of Care Note  Cardiothoracic Surgery    Suyapa Connelly is a 54 y.o. female with CAD, DM, HF s/p CABG (1/14/20).    Specific issues: ventilation wean to extubation, pressor wean, may consider enteral nutrition alex; R IJ thrombus    Plan of care for patient was discussed with ICU staff including nurses, residents, and faculty and appropriate consulting services.    Will continue to monitor patient's hemodynamics, functionality, neuro status, fluid status and renal function, and labs and will adjust medications and fluids as necessary while monitoring appropriateness for de-escalation of support and monitoring and transport to stepdown unit.    Christian Dobson MD  Cardiothoracic Surgery Fellow

## 2020-01-21 NOTE — PROGRESS NOTES
[facility]  Nephrology  Progress Note    Patient Name: Suyapa Connelly  MRN: [id number]  Admission Date: 1/2/2020  Hospital Length of Stay: 19 days  Attending Provider: Huang Altamirano MD   Primary Care Physician: Erlinda Rahman NP  Principal Problem:Acute coronary syndrome    Subjective:     HPI: 53 y/o AAF with known medical medical issues of chronic combined systolic and diastolic HF (EF 35% Grade II DD), CAD S/P MI 2010, Angiogram 12/18/19 mid LAD 90% stenosis, mid OM2 75% stenosis, and Ostial D1 70% stenosis LVDEP 25 mmHg, PAD S/P PTA with stents BLE 2015, IDDM who presented to the ED with SOB and chest pain, found to have an NSTEMI which was treated medically with asa, heparin gtt. CTA of the lower extremities performed on 01/03 to investigate vasculature useful for CABG. Creatinine 3.6 up from a baseline of 1.2. She reports decreased urine output since the CTA but no increased swelling of the lower extremities. No SOB. She an episode of hypotension on 01/04 with a systolic of 80. She was on Lasix IV BID, last dose on 01/04 at 9:00 am. Nephrology consulted for oliguric ROLSYN.      Interval History: Seen on SLED this AM, tolerating well. Patient net negative 1 L/24 hrs. Extubated to 4 L NC this AM, no distress noted. Patient remains only on  infusion at this time. Electrolytes and acid base stable. BP stable.     Review of patient's allergies indicates:   Allergen Reactions    Contrast media      Kidney injury    Pcn [penicillins]      Rash; tolerated ceftriaxone on 1/13/20     Current Facility-Administered Medications   Medication Frequency    0.9%  NaCl infusion (CRRT USE ONLY) Continuous    0.9%  NaCl infusion (for blood administration) Q24H PRN    acetaminophen tablet 650 mg Q4H PRN    acetaminophen tablet 650 mg Q6H PRN    aspirin chewable tablet 81 mg Daily    atorvastatin tablet 80 mg QHS    bisacodyl suppository 10 mg Q12H PRN    clopidogrel tablet 75 mg Daily     dexmedetomidine (PRECEDEX) 400mcg/100mL 0.9% NaCL infusion Continuous    dextrose 10% (D10W) Bolus PRN    dextrose 10% (D10W) Bolus PRN    dextrose 5 % and 0.9 % NaCl with KCl 20 mEq infusion Continuous    DOBUTamine 500mg in D5W 250mL infusion (premix) (NON-TITRATING) Continuous    EPINEPHrine (ADRENALIN) 5 mg in sodium chloride 0.9% 250 mL infusion Continuous    glucagon (human recombinant) injection 1 mg PRN    heparin (porcine) injection 5,000 Units Q8H    HYDROmorphone injection 0.2 mg Q1H PRN    insulin aspart U-100 pen 0-5 Units Q4H PRN    insulin detemir U-100 pen 6 Units Daily    magnesium sulfate 2g in water 50mL IVPB (premix) PRN    metoclopramide HCl injection 10 mg Q6H PRN    ondansetron disintegrating tablet 8 mg Q8H PRN    oxyCODONE immediate release tablet 10 mg Q4H PRN    oxyCODONE immediate release tablet 5 mg Q4H PRN    pantoprazole injection 40 mg BID    polyethylene glycol packet 17 g Daily    scopolamine 1.3-1.5 mg (1 mg over 3 days) 1 patch Q3 Days    sodium phosphate 20.01 mmol in dextrose 5 % 250 mL IVPB PRN    sodium phosphate 30 mmol in dextrose 5 % 250 mL IVPB PRN    sodium phosphate 39.99 mmol in dextrose 5 % 250 mL IVPB PRN       Objective:     Vital Signs (Most Recent):  Temp: 98.9 °F (37.2 °C) (01/21/20 1100)  Pulse: 85 (01/21/20 1115)  Resp: 19 (01/21/20 1115)  BP: (!) 115/56 (01/20/20 1900)  SpO2: 100 % (01/21/20 1115)  O2 Device (Oxygen Therapy): High Flow nasal Cannula (01/21/20 1100) Vital Signs (24h Range):  Temp:  [97.9 °F (36.6 °C)-99.1 °F (37.3 °C)] 98.9 °F (37.2 °C)  Pulse:  [71-87] 85  Resp:  [18-23] 19  SpO2:  [85 %-100 %] 100 %  BP: (115-143)/(56-67) 115/56  Arterial Line BP: ()/(39-65) 116/51     Weight: 103.7 kg (228 lb 9.9 oz) (01/21/20 0400)  Body mass index is 39.24 kg/m².  Body surface area is 2.16 meters squared.    I/O last 3 completed shifts:  In: 9845.9 [I.V.:8991.3; NG/GT:270; IV Piggyback:584.6]  Out: 74732 [Urine:40; Drains:200;  Other:02464; Chest Tube:50]    Physical Exam   Constitutional: She appears well-developed and well-nourished. She appears lethargic. She appears ill. No distress. Nasal cannula in place.   HENT:   Head: Normocephalic and atraumatic.   Eyes: Pupils are equal, round, and reactive to light. Conjunctivae are normal.   Neck: Neck supple.   Cardiovascular: Normal rate.   Pulmonary/Chest: She has no wheezes. She has rales.   Intubated and mechanically ventilated. Chest tubes in place    Abdominal: Soft. She exhibits no distension and no mass. There is no guarding. No hernia.   Neurological: She appears lethargic.   Sedated   Skin: Skin is warm and dry.       Significant Labs:  CBC:   Recent Labs   Lab 01/21/20  0406 01/21/20  0501   WBC 12.31  --    RBC 2.61*  --    HGB 7.6*  --    HCT 25.3* 23*   *  --    MCV 97  --    MCH 29.1  --    MCHC 30.0*  --      CMP:   Recent Labs   Lab 01/21/20  0406   GLU 98  98   CALCIUM 8.5*  8.5*   ALBUMIN 2.4*  2.4*   PROT 6.7     140   K 4.3  4.3   CO2 24  24     106   BUN 2*  2*   CREATININE 0.6  0.6   ALKPHOS 187*   *   *   BILITOT 2.7*            Assessment/Plan:     * Acute coronary syndrome  - per primary team     ROSLYN (acute kidney injury)  At time of re-consult: Ms Connelly is a 53 y/o female who is s/p CABG. Nephrology re-consulted for ROSLYN, decreased UOP, and concern for volume overload. Patient is now anuric with sCr trending up (4.4 on 1/16/20). Patient with metabolic acidosis on ABG/serum CO2 of 19. Patient now with concern for volume overload and now intubated after progressively increased oxygen requirements. Concern for pulmonary edema on CXR.     Plan/recommendations:   -Continue SLED for metabolic clearance and volume management. UF goal 350cc/hr.   -Will likely reassess continuous SLED requirements tomorrow morning   -Strict I/O's   -Renally dose medications   -Avoid nephrotoxic medications  -Labs per CRRT protocol  -Maintain Hgb  >7.0  -Will discuss with Dr. iTm           Thank you for your consult. I will follow-up with patient. Please contact us if you have any additional questions.    Sarah Saba DNP, FNP-C  Nephrology  [facility]

## 2020-01-21 NOTE — SUBJECTIVE & OBJECTIVE
Interval History/Significant Events: Doing well from a respiratory standpoint. Pt complaining of left arm pain but unable to further describe it. Struggling with a bit of delirium still.     Follow-up For: Procedure(s) (LRB):  CORONARY ARTERY BYPASS GRAFT (CABG) x 1     Off Pump (N/A)    Post-Operative Day: 7 Days Post-Op    Objective:     Vital Signs (Most Recent):  Temp: 99.1 °F (37.3 °C) (01/20/20 2300)  Pulse: 77 (01/21/20 0600)  Resp: 18 (01/20/20 1713)  BP: (!) 115/56 (01/20/20 1900)  SpO2: 95 % (01/21/20 0600) Vital Signs (24h Range):  Temp:  [97.9 °F (36.6 °C)-99.7 °F (37.6 °C)] 99.1 °F (37.3 °C)  Pulse:  [71-87] 77  Resp:  [16-23] 18  SpO2:  [85 %-100 %] 95 %  BP: (115-143)/(56-67) 115/56  Arterial Line BP: ()/(42-65) 126/56     Weight: 103.7 kg (228 lb 9.9 oz)  Body mass index is 39.24 kg/m².      Intake/Output Summary (Last 24 hours) at 1/21/2020 0613  Last data filed at 1/21/2020 0600  Gross per 24 hour   Intake 6933.85 ml   Output 8449 ml   Net -1515.15 ml       Physical Exam   Constitutional: She appears well-developed and well-nourished. She is cooperative.   HENT:   Head: Normocephalic and atraumatic.   Eyes: Pupils are equal, round, and reactive to light.   Neck: Normal range of motion. Neck supple.   Bilateral CVC, dressings CDI   Cardiovascular: Normal rate and regular rhythm.   Abdominal: Soft. She exhibits no distension. There is no rebound.   Musculoskeletal: Normal range of motion. She exhibits edema. She exhibits no deformity.   Pain to touch of forearm.    Neurological: She is alert.   sedated   Skin: Skin is warm and dry. No rash noted. No erythema.       Lines/Drains/Airways     Central Venous Catheter Line                 Introducer right internal jugular -- days         Percutaneous Central Line Insertion/Assessment - triple lumen  right internal jugular -- days         Trialysis (Dialysis) Catheter left internal jugular -- days         Percutaneous Central Line  Insertion/Assessment - double lumen  01/14/20 0725 6 days          Drain                 Chest Tube 01/14/20 1017 1 Left Pleural 19 Fr. 6 days         Urethral Catheter 01/14/20 0720 Non-latex;Temperature probe;Straight-tip 14 Fr. 6 days         Y Chest Tube 1 and 2 01/14/20 1018 1 Anterior Mediastinal 19 Fr. 2 Anterior Mediastinal 19 Fr. 6 days         NG/OG Tube 01/16/20 1446 orogastric 4 days          Airway                 Airway - Non-Surgical 01/16/20 1442 Endotracheal Tube 4 days          Arterial Line                 Arterial Line 01/16/20 2300 Right Radial 4 days          Line                 Pacer Wires 01/14/20 1009 6 days                Significant Labs:    CBC/Anemia Profile:  Recent Labs   Lab 01/20/20  0300 01/20/20  0359 01/21/20  0406 01/21/20  0501   WBC 9.73  --  12.31  --    HGB 8.0*  --  7.6*  --    HCT 26.7* 24* 25.3* 23*   *  --  141*  --    MCV 98  --  97  --    RDW 17.4*  --  17.2*  --         Chemistries:  Recent Labs   Lab 01/20/20  0300 01/20/20  1400 01/20/20  2205 01/21/20  0406     141 141 139 140  140   K 4.8  4.8 4.8 4.6 4.3  4.3     105 106 106 106  106   CO2 27  27 26 26 24  24   BUN 3*  3* 2* 2* 2*  2*   CREATININE 0.7  0.7 0.7 0.7 0.6  0.6   CALCIUM 8.7  8.7 8.7 8.4* 8.5*  8.5*   ALBUMIN 2.6*  2.6* 2.5* 2.4* 2.4*  2.4*   PROT 6.8  --   --  6.7   BILITOT 2.7*  --   --  2.7*   ALKPHOS 163*  --   --  187*   *  --   --  385*   *  --   --  131*   MG 2.0 1.9 2.0 1.9   PHOS 2.4* 2.5* 2.9 2.3*       ABGs:   Recent Labs   Lab 01/21/20  0501   PH 7.498*   PCO2 31.8*   HCO3 24.6   POCSATURATED 97   BE 1     Coagulation:   Recent Labs   Lab 01/21/20  0406   INR 0.9     Lactic Acid: No results for input(s): LACTATE in the last 48 hours.    Significant Imaging:  I have reviewed all pertinent imaging results/findings within the past 24 hours.

## 2020-01-21 NOTE — PROGRESS NOTES
Two episodes of patient agitated, yelling, shaking arms and legs in bed, dropping MAPs and sats.  Many calming exercises attempted by RN and  with no relief.  CTS MD called to bedside both times, ABG obtained and WNL, restarted precedex, chest xray, and non-rebreather.  Patient now resting and all VSS.

## 2020-01-21 NOTE — PLAN OF CARE
01/21/20 1351   Discharge Reassessment   Assessment Type Discharge Planning Reassessment   Discharge Plan A Home Health   Discharge Plan B Home with family   DME Needed Upon Discharge  other (see comments)  (TBD)   Post-Acute Status   Discharge Delays None known at this time       Elmira Calderon MPH, RN, CM  Ext. [phone]

## 2020-01-21 NOTE — PLAN OF CARE
Plan of Care Note  Cardiothoracic Surgery    Suyapa Connelly is a 54 y.o. female with CAD, DM, HF s/p CABG (1/14/20).    Specific issues: extubated this AM, d/c R IJ line, pRBC transfusion    Plan of care for patient was discussed with ICU staff including nurses, residents, and faculty and appropriate consulting services.    Will continue to monitor patient's hemodynamics, functionality, neuro status, fluid status and renal function, and labs and will adjust medications and fluids as necessary while monitoring appropriateness for de-escalation of support and monitoring and transport to stepdown unit.    Christian Dobson MD  Cardiothoracic Surgery Fellow

## 2020-01-21 NOTE — PROGRESS NOTES
"[facility]  Adult Nutrition  Progress Note    SUMMARY       Recommendations  1. As medically able, ADAT to Cardiac with texture per SLP.   2. If unable to advance diet, recommend initiating TF of Impact Peptide 1.5 at a goal rate of 45 mL/hr - to provide 1620 kcal/day, 102g protein/day, and 832mL free fluid/day.  RD to monitor.     Goals: Patient to receive nutrition by RD follow-up  Nutrition Goal Status: goal not met  Communication of RD Recs: discussed on rounds    Reason for Assessment  Reason For Assessment: RD follow-up  Diagnosis: surgery/postoperative complications(s/p CABG 1/14)  Relevant Medical History: CAD, CHF, DM2, HLD, HTN, GERD, PAD  Interdisciplinary Rounds: attended  General Information Comments: Extubated this morning. Remains NPO, has been for 7 days now. Patient continues with no indicators of malnutrition and stable weight but at risk for acute malnutirtion.  Nutrition Discharge Planning: Unable to determine at this time.    Nutrition Risk Screen  Nutrition Risk Screen: no indicators present     Nutrition/Diet History  Spiritual, Cultural Beliefs, Holiness Practices, Values that Affect Care: no  Factors Affecting Nutritional Intake: NPO    Anthropometrics  Temp: 98.9 °F (37.2 °C)  Height Method: Stated  Height: 5' 4" (162.6 cm)  Height (inches): 64 in  Weight Method: Bed Scale  Weight: 103.7 kg (228 lb 9.9 oz)  Weight (lb): 228.62 lb  Ideal Body Weight (IBW), Female: 120 lb  % Ideal Body Weight, Female (lb): 185 %  BMI (Calculated): 39.2  BMI Grade: 35 - 39.9 - obesity - grade II    Lab/Procedures/Meds  Pertinent Labs Reviewed: reviewed  Pertinent Labs Comments: Ca 8.5, Phos 2.3, Alb 2.4  Pertinent Medications Reviewed: reviewed  Pertinent Medications Comments: pantoprazole, dobutamine, epinephrine    Estimated/Assessed Needs  Weight Used For Calorie Calculations: 100.7 kg (222 lb 0.1 oz)  Energy Calorie Requirements (kcal): 1597 kcal/day  Energy Need Method: [place](no AF 2/2 obesity)  Protein Requirements:  g/day(1.5-2.0 g/kg)  Weight Used For Protein Calculations: 54.5 kg (120 lb 2.4 oz)(IBW)  Fluid Requirements (mL): 1 mL/kcal or per MD  Estimated Fluid Requirement Method: RDA Method  RDA Method (mL): 1597    Nutrition Prescription Ordered  Current Diet Order: NPO    Evaluation of Received Nutrient/Fluid Intake  I/O: -1L x 24hrs, -4.1L since admit  Comments: LBM 1/20  % Intake of Estimated Energy Needs: 0 - 25 %  % Meal Intake: NPO    Nutrition Risk  Level of Risk/Frequency of Follow-up: high(2x/week)     Assessment and Plan  Nutrition Problem  Increased nutrient needs     Related to (etiology):   Physiological causes related to healing     Signs and Symptoms (as evidenced by):   S/p CABG 1/14      Interventions (treatment strategy):  Collaboration of nutrition care with other providers     Nutrition Diagnosis Status:   Continues        Nutrition Problem  Inadequate energy intake     Related to (etiology):   Decreased ability to consume sufficient energy     Signs and Symptoms (as evidenced by):   NPO with no alternative means of nutrition at this time     Interventions (treatment strategy):  Collaboration of nutrition care with other providers     Nutrition Diagnosis Status:   Continues    Monitor and Evaluation  Food and Nutrient Intake: energy intake, food and beverage intake  Food and Nutrient Adminstration: diet order  Knowledge/Beliefs/Attitudes: food and nutrition knowledge/skill  Physical Activity and Function: nutrition-related ADLs and IADLs  Anthropometric Measurements: weight, weight change  Biochemical Data, Medical Tests and Procedures: electrolyte and renal panel, gastrointestinal profile, glucose/endocrine profile, inflammatory profile  Nutrition-Focused Physical Findings: overall appearance     Nutrition Follow-Up  RD Follow-up?: Yes

## 2020-01-21 NOTE — CARE UPDATE
SBT passed. MD at bedside. Pt extubated per MD order to 4 L nasal cannula. Respiratory effort unlabored at this time and patient tolerating well. SpO2 97%. Will continue to monitor closely.

## 2020-01-21 NOTE — NURSING
SHIFT EVENTS: Epinephrine weaned to off    NEUROLOGICAL: Precedex titrated to maintain RASS -2, PRN dilaudid and fentanyl given for pain/agitation, patient moves all extremities independently with strength, perrla, follows all commands when calm    CARDIOVASCULAR: SR with occasional PVCs, temporary run of Afib converted back to SR without intervention, Epi titrated off, Hydralizine given to maintain SBP<150, CVP 12-14, pulses palpable in all extremities    PULMONARY: Adequate saturations on 40% fio2, spontaneous mode switched to SIMV with agitation, lung sounds clear with minimal suctioning required    GENITOURINARY: Largely anuric via snow, SLED continuous at 350 UF, tolerating well without issue    GASTROINTESTINAL: +BS, no BM this shift, OGT to LIWS with minimal output    NUTRITION/ENDOCRINE:  NPO, Q4 accuchecks without supplemental insulin required    SKIN/WOUNDS: MSI remains well approximated, CT x3 with SS output as recorded, no new pressure related injury noted this shift    ACTIVITY: Q2h turning in bed    SOCIAL SUPPORT:  at bedside throughout shift, very supportive, understanding of plan to extubate today

## 2020-01-21 NOTE — SUBJECTIVE & OBJECTIVE
Interval History: Seen on SLED this AM, tolerating well. Patient net negative 1 L/24 hrs. Extubated to 4 L NC this AM, no distress noted. Patient remains only on  infusion at this time. Electrolytes and acid base stable. BP stable.     Review of patient's allergies indicates:   Allergen Reactions    Contrast media      Kidney injury    Pcn [penicillins]      Rash; tolerated ceftriaxone on 1/13/20     Current Facility-Administered Medications   Medication Frequency    0.9%  NaCl infusion (CRRT USE ONLY) Continuous    0.9%  NaCl infusion (for blood administration) Q24H PRN    acetaminophen tablet 650 mg Q4H PRN    acetaminophen tablet 650 mg Q6H PRN    aspirin chewable tablet 81 mg Daily    atorvastatin tablet 80 mg QHS    bisacodyl suppository 10 mg Q12H PRN    clopidogrel tablet 75 mg Daily    dexmedetomidine (PRECEDEX) 400mcg/100mL 0.9% NaCL infusion Continuous    dextrose 10% (D10W) Bolus PRN    dextrose 10% (D10W) Bolus PRN    dextrose 5 % and 0.9 % NaCl with KCl 20 mEq infusion Continuous    DOBUTamine 500mg in D5W 250mL infusion (premix) (NON-TITRATING) Continuous    EPINEPHrine (ADRENALIN) 5 mg in sodium chloride 0.9% 250 mL infusion Continuous    glucagon (human recombinant) injection 1 mg PRN    heparin (porcine) injection 5,000 Units Q8H    HYDROmorphone injection 0.2 mg Q1H PRN    insulin aspart U-100 pen 0-5 Units Q4H PRN    insulin detemir U-100 pen 6 Units Daily    magnesium sulfate 2g in water 50mL IVPB (premix) PRN    metoclopramide HCl injection 10 mg Q6H PRN    ondansetron disintegrating tablet 8 mg Q8H PRN    oxyCODONE immediate release tablet 10 mg Q4H PRN    oxyCODONE immediate release tablet 5 mg Q4H PRN    pantoprazole injection 40 mg BID    polyethylene glycol packet 17 g Daily    scopolamine 1.3-1.5 mg (1 mg over 3 days) 1 patch Q3 Days    sodium phosphate 20.01 mmol in dextrose 5 % 250 mL IVPB PRN    sodium phosphate 30 mmol in dextrose 5 % 250 mL IVPB PRN     sodium phosphate 39.99 mmol in dextrose 5 % 250 mL IVPB PRN       Objective:     Vital Signs (Most Recent):  Temp: 98.9 °F (37.2 °C) (01/21/20 1100)  Pulse: 85 (01/21/20 1115)  Resp: 19 (01/21/20 1115)  BP: (!) 115/56 (01/20/20 1900)  SpO2: 100 % (01/21/20 1115)  O2 Device (Oxygen Therapy): High Flow nasal Cannula (01/21/20 1100) Vital Signs (24h Range):  Temp:  [97.9 °F (36.6 °C)-99.1 °F (37.3 °C)] 98.9 °F (37.2 °C)  Pulse:  [71-87] 85  Resp:  [18-23] 19  SpO2:  [85 %-100 %] 100 %  BP: (115-143)/(56-67) 115/56  Arterial Line BP: ()/(39-65) 116/51     Weight: 103.7 kg (228 lb 9.9 oz) (01/21/20 0400)  Body mass index is 39.24 kg/m².  Body surface area is 2.16 meters squared.    I/O last 3 completed shifts:  In: 9845.9 [I.V.:8991.3; NG/GT:270; IV Piggyback:584.6]  Out: 57910 [Urine:40; Drains:200; Other:53016; Chest Tube:50]    Physical Exam   Constitutional: She appears well-developed and well-nourished. She appears lethargic. She appears ill. No distress. Nasal cannula in place.   HENT:   Head: Normocephalic and atraumatic.   Eyes: Pupils are equal, round, and reactive to light. Conjunctivae are normal.   Neck: Neck supple.   Cardiovascular: Normal rate.   Pulmonary/Chest: She has no wheezes. She has rales.   Intubated and mechanically ventilated. Chest tubes in place    Abdominal: Soft. She exhibits no distension and no mass. There is no guarding. No hernia.   Neurological: She appears lethargic.   Sedated   Skin: Skin is warm and dry.       Significant Labs:  CBC:   Recent Labs   Lab 01/21/20  0406 01/21/20  0501   WBC 12.31  --    RBC 2.61*  --    HGB 7.6*  --    HCT 25.3* 23*   *  --    MCV 97  --    MCH 29.1  --    MCHC 30.0*  --      CMP:   Recent Labs   Lab 01/21/20  0406   GLU 98  98   CALCIUM 8.5*  8.5*   ALBUMIN 2.4*  2.4*   PROT 6.7     140   K 4.3  4.3   CO2 24  24     106   BUN 2*  2*   CREATININE 0.6  0.6   ALKPHOS 187*   *   *   BILITOT 2.7*

## 2020-01-21 NOTE — CARE UPDATE
BG goal 140 - 180   Fasting BG below goal ranges this morning.     Decrease Levemir to 6 units daily. Fasting BG below goal ranges. (50% dose decrease)   Low Dose SQ Insulin Correction Scale every 4 hours while NPO.   BG monitoring every 4 hours while NPO.      ** Please call Endocrine for any BG related issues **  ** Please notify Endocrine for any change and/or advance in diet**        Discharge Planning:     TBD. Please notify endocrinology prior to discharge. Patient will need adjustments to home insulin regimen.

## 2020-01-21 NOTE — ASSESSMENT & PLAN NOTE
At time of re-consult: Ms Connelly is a 55 y/o female who is s/p CABG. Nephrology re-consulted for ROSLYN, decreased UOP, and concern for volume overload. Patient is now anuric with sCr trending up (4.4 on 1/16/20). Patient with metabolic acidosis on ABG/serum CO2 of 19. Patient now with concern for volume overload and now intubated after progressively increased oxygen requirements. Concern for pulmonary edema on CXR.     Plan/recommendations:   -Continue SLED for metabolic clearance and volume management. UF goal 350cc/hr.   -Will likely reassess continuous SLED requirements tomorrow morning   -Strict I/O's   -Renally dose medications   -Avoid nephrotoxic medications  -Labs per CRRT protocol  -Maintain Hgb >7.0  -Will discuss with Dr. Tim

## 2020-01-21 NOTE — NURSING
Dr. Cordova at bedside, updated of events and patient condition overnight. Precedex decreased to 0.7 for awakening trial and ventilator returned to PAV 30% support, fio2 40%, PEEP 6.0 for breathing trial. Patient calm with RR 20. RT Aminata notified of changes. Dr. Altamirano to be at bedside soon to form plan for extubation today.

## 2020-01-21 NOTE — NURSING
Patient awoke in a sudden panic, RASS +4, not responding to any calming measures. Precedex increased to 1.4, PRN dilaudid given with no effect. Dr. Houston notified and ordered x1 IVP fentanyl with good relief. RR >40bpm. RT Aminata notified and patient returned to SIMV 40%/6.0. Now RASS -2, VS returned to baseline.

## 2020-01-21 NOTE — ASSESSMENT & PLAN NOTE
Neuro:   - PRN fentanyl   - Multimodal pain regimen once tolerating PO     Pulmonary:   - Extubated 1/21  - CXR prn  - ABG prn  - Pulmonary toilet  - IS     Cardiac:  - Epi off  - Dobutamine @ 5  - Echo 1/17:            Mildly decreased left ventricular systolic function. The estimated ejection fraction is 50%.  · Concentric left ventricular remodeling.  · Local segmental wall motion abnormalities.  · Septal wall has abnormal motion.  · Grade I (mild) left ventricular diastolic dysfunction consistent with impaired relaxation.  · Normal right ventricular systolic function.  · Severe tricuspid regurgitation.   -Pacer turned off    Renal:   - CRRT discontinued 1/21  - BUN/Cr increased, CTM  - Kerns in place for close monitoring of UOP  - Possibly Lasix later tonight     Infectious Disease:   - Afebrile, WBC 12  - Antibiotics: Perioperative     Hematology/Oncology:  - monitor H/H, today 7.4/23.7  - pRBC x1 today     Endocrine:  - Endocrine consulted, keep blood glucose goals 140-180     Fluids/Electrolytes/Nutrition/GI:   - D5 NS 20 KCl @ 5  - Trend CMP  - LFTs trending down  - Replace Lytes PRN   - NPO, SLP to evaluate today  - Scopolamine patch, PRN Zofran     Prophylaxis:  - GI: protonix daily     Dispo:  Continue ICU care, PT/OT, possibly PMR consult. D/C RIJ CVC  Primary team: [facility]

## 2020-01-21 NOTE — NURSING
Sedation vacation completed and patient awoke to voice. Followed commands and moved all extremities spontaneously. Precedex restarted at 0.1, titrated up to 0.2 for adequate sedation.

## 2020-01-21 NOTE — CONSULTS
Placed 18g, 10 cm Midline in right basilic vein using u/s guidance.  Max dwell date 2/19/2020.  Lot # [id number].

## 2020-01-21 NOTE — NURSING
Epi Weaned off at 0100. Patient's blood pressure continues to increase, sustaining above goal of SBP<150. Dr. Houston notified, x1 10mg IVP hydralizine ordered and given.

## 2020-01-22 LAB
ALBUMIN SERPL BCP-MCNC: 2.4 G/DL (ref 3.5–5.2)
ALLENS TEST: ABNORMAL
ALP SERPL-CCNC: 311 U/L (ref 55–135)
ALT SERPL W/O P-5'-P-CCNC: 298 U/L (ref 10–44)
ANION GAP SERPL CALC-SCNC: 11 MMOL/L (ref 8–16)
ANION GAP SERPL CALC-SCNC: 14 MMOL/L (ref 8–16)
ANISOCYTOSIS BLD QL SMEAR: SLIGHT
AST SERPL-CCNC: 119 U/L (ref 10–40)
BASO STIPL BLD QL SMEAR: ABNORMAL
BASOPHILS # BLD AUTO: 0.02 K/UL (ref 0–0.2)
BASOPHILS NFR BLD: 0.2 % (ref 0–1.9)
BILIRUB SERPL-MCNC: 2.2 MG/DL (ref 0.1–1)
BLD PROD TYP BPU: NORMAL
BLOOD UNIT EXPIRATION DATE: NORMAL
BLOOD UNIT TYPE CODE: 5100
BLOOD UNIT TYPE: NORMAL
BUN SERPL-MCNC: 16 MG/DL (ref 6–20)
BUN SERPL-MCNC: 6 MG/DL (ref 6–20)
CALCIUM SERPL-MCNC: 8.4 MG/DL (ref 8.7–10.5)
CALCIUM SERPL-MCNC: 8.5 MG/DL (ref 8.7–10.5)
CHLORIDE SERPL-SCNC: 102 MMOL/L (ref 95–110)
CHLORIDE SERPL-SCNC: 104 MMOL/L (ref 95–110)
CO2 SERPL-SCNC: 22 MMOL/L (ref 23–29)
CO2 SERPL-SCNC: 22 MMOL/L (ref 23–29)
CODING SYSTEM: NORMAL
CREAT SERPL-MCNC: 1.4 MG/DL (ref 0.5–1.4)
CREAT SERPL-MCNC: 2.5 MG/DL (ref 0.5–1.4)
DELSYS: ABNORMAL
DIFFERENTIAL METHOD: ABNORMAL
DISPENSE STATUS: NORMAL
EOSINOPHIL # BLD AUTO: 0.1 K/UL (ref 0–0.5)
EOSINOPHIL NFR BLD: 0.6 % (ref 0–8)
ERYTHROCYTE [DISTWIDTH] IN BLOOD BY AUTOMATED COUNT: 16.8 % (ref 11.5–14.5)
EST. GFR  (AFRICAN AMERICAN): 24.5 ML/MIN/1.73 M^2
EST. GFR  (AFRICAN AMERICAN): 49.5 ML/MIN/1.73 M^2
EST. GFR  (NON AFRICAN AMERICAN): 21.3 ML/MIN/1.73 M^2
EST. GFR  (NON AFRICAN AMERICAN): 42.9 ML/MIN/1.73 M^2
FIO2: 28
FLOW: 2
FLOW: 2
FLOW: 5
GIANT PLATELETS BLD QL SMEAR: PRESENT
GLUCOSE SERPL-MCNC: 114 MG/DL (ref 70–110)
GLUCOSE SERPL-MCNC: 137 MG/DL (ref 70–110)
HCO3 UR-SCNC: 21.4 MMOL/L (ref 24–28)
HCO3 UR-SCNC: 22.7 MMOL/L (ref 24–28)
HCO3 UR-SCNC: 23.6 MMOL/L (ref 24–28)
HCT VFR BLD AUTO: 23.7 % (ref 37–48.5)
HGB BLD-MCNC: 7.4 G/DL (ref 12–16)
HYPOCHROMIA BLD QL SMEAR: ABNORMAL
IMM GRANULOCYTES # BLD AUTO: 0.06 K/UL (ref 0–0.04)
IMM GRANULOCYTES NFR BLD AUTO: 0.5 % (ref 0–0.5)
INR PPP: 1 (ref 0.8–1.2)
LDH SERPL L TO P-CCNC: 0.67 MMOL/L (ref 0.36–1.25)
LDH SERPL L TO P-CCNC: 0.84 MMOL/L (ref 0.36–1.25)
LYMPHOCYTES # BLD AUTO: 1.5 K/UL (ref 1–4.8)
LYMPHOCYTES NFR BLD: 12.4 % (ref 18–48)
MAGNESIUM SERPL-MCNC: 2 MG/DL (ref 1.6–2.6)
MAGNESIUM SERPL-MCNC: 2 MG/DL (ref 1.6–2.6)
MCH RBC QN AUTO: 30.2 PG (ref 27–31)
MCHC RBC AUTO-ENTMCNC: 31.2 G/DL (ref 32–36)
MCV RBC AUTO: 97 FL (ref 82–98)
MODE: ABNORMAL
MONOCYTES # BLD AUTO: 2.4 K/UL (ref 0.3–1)
MONOCYTES NFR BLD: 19.4 % (ref 4–15)
NEUTROPHILS # BLD AUTO: 8.2 K/UL (ref 1.8–7.7)
NEUTROPHILS NFR BLD: 66.9 % (ref 38–73)
NRBC BLD-RTO: 0 /100 WBC
OVALOCYTES BLD QL SMEAR: ABNORMAL
PCO2 BLDA: 33 MMHG (ref 35–45)
PCO2 BLDA: 35.3 MMHG (ref 35–45)
PCO2 BLDA: 35.7 MMHG (ref 35–45)
PH SMN: 7.42 [PH] (ref 7.35–7.45)
PH SMN: 7.42 [PH] (ref 7.35–7.45)
PH SMN: 7.43 [PH] (ref 7.35–7.45)
PHOSPHATE SERPL-MCNC: 4.4 MG/DL (ref 2.7–4.5)
PHOSPHATE SERPL-MCNC: 4.9 MG/DL (ref 2.7–4.5)
PLATELET # BLD AUTO: 153 K/UL (ref 150–350)
PLATELET BLD QL SMEAR: ABNORMAL
PMV BLD AUTO: 10.9 FL (ref 9.2–12.9)
PO2 BLDA: 116 MMHG (ref 80–100)
PO2 BLDA: 95 MMHG (ref 80–100)
PO2 BLDA: 99 MMHG (ref 80–100)
POC BE: -1 MMOL/L
POC BE: -2 MMOL/L
POC BE: -3 MMOL/L
POC SATURATED O2: 98 % (ref 95–100)
POC SATURATED O2: 98 % (ref 95–100)
POC SATURATED O2: 99 % (ref 95–100)
POC TCO2: 22 MMOL/L (ref 23–27)
POC TCO2: 24 MMOL/L (ref 23–27)
POC TCO2: 25 MMOL/L (ref 23–27)
POCT GLUCOSE: 114 MG/DL (ref 70–110)
POCT GLUCOSE: 120 MG/DL (ref 70–110)
POCT GLUCOSE: 133 MG/DL (ref 70–110)
POCT GLUCOSE: 159 MG/DL (ref 70–110)
POCT GLUCOSE: 95 MG/DL (ref 70–110)
POIKILOCYTOSIS BLD QL SMEAR: SLIGHT
POLYCHROMASIA BLD QL SMEAR: ABNORMAL
POTASSIUM SERPL-SCNC: 4.2 MMOL/L (ref 3.5–5.1)
POTASSIUM SERPL-SCNC: 4.3 MMOL/L (ref 3.5–5.1)
PROT SERPL-MCNC: 6.8 G/DL (ref 6–8.4)
PROTHROMBIN TIME: 10.7 SEC (ref 9–12.5)
RBC # BLD AUTO: 2.45 M/UL (ref 4–5.4)
SAMPLE: ABNORMAL
SITE: ABNORMAL
SODIUM SERPL-SCNC: 135 MMOL/L (ref 136–145)
SODIUM SERPL-SCNC: 140 MMOL/L (ref 136–145)
SP02: 100
TRANS ERYTHROCYTES VOL PATIENT: NORMAL ML
WBC # BLD AUTO: 12.25 K/UL (ref 3.9–12.7)

## 2020-01-22 PROCEDURE — 85025 COMPLETE CBC W/AUTO DIFF WBC: CPT

## 2020-01-22 PROCEDURE — 37799 UNLISTED PX VASCULAR SURGERY: CPT

## 2020-01-22 PROCEDURE — 99232 PR SUBSEQUENT HOSPITAL CARE,LEVL II: ICD-10-PCS | Mod: ,,, | Performed by: NURSE PRACTITIONER

## 2020-01-22 PROCEDURE — 80053 COMPREHEN METABOLIC PANEL: CPT

## 2020-01-22 PROCEDURE — 99232 SBSQ HOSP IP/OBS MODERATE 35: CPT | Mod: ,,, | Performed by: NURSE PRACTITIONER

## 2020-01-22 PROCEDURE — C9113 INJ PANTOPRAZOLE SODIUM, VIA: HCPCS | Performed by: STUDENT IN AN ORGANIZED HEALTH CARE EDUCATION/TRAINING PROGRAM

## 2020-01-22 PROCEDURE — 84100 ASSAY OF PHOSPHORUS: CPT | Mod: 91

## 2020-01-22 PROCEDURE — 83735 ASSAY OF MAGNESIUM: CPT

## 2020-01-22 PROCEDURE — 92610 EVALUATE SWALLOWING FUNCTION: CPT

## 2020-01-22 PROCEDURE — 99291 CRITICAL CARE FIRST HOUR: CPT | Mod: ,,, | Performed by: SURGERY

## 2020-01-22 PROCEDURE — 25000003 PHARM REV CODE 250: Performed by: SURGERY

## 2020-01-22 PROCEDURE — 82800 BLOOD PH: CPT

## 2020-01-22 PROCEDURE — 99291 PR CRITICAL CARE, E/M 30-74 MINUTES: ICD-10-PCS | Mod: ,,, | Performed by: SURGERY

## 2020-01-22 PROCEDURE — 99900035 HC TECH TIME PER 15 MIN (STAT)

## 2020-01-22 PROCEDURE — 27000221 HC OXYGEN, UP TO 24 HOURS

## 2020-01-22 PROCEDURE — 84100 ASSAY OF PHOSPHORUS: CPT

## 2020-01-22 PROCEDURE — 20000000 HC ICU ROOM

## 2020-01-22 PROCEDURE — 80048 BASIC METABOLIC PNL TOTAL CA: CPT

## 2020-01-22 PROCEDURE — 63600175 PHARM REV CODE 636 W HCPCS: Performed by: SURGERY

## 2020-01-22 PROCEDURE — 97530 THERAPEUTIC ACTIVITIES: CPT

## 2020-01-22 PROCEDURE — 94761 N-INVAS EAR/PLS OXIMETRY MLT: CPT

## 2020-01-22 PROCEDURE — 99233 SBSQ HOSP IP/OBS HIGH 50: CPT | Mod: ,,, | Performed by: NURSE PRACTITIONER

## 2020-01-22 PROCEDURE — 97535 SELF CARE MNGMENT TRAINING: CPT

## 2020-01-22 PROCEDURE — P9021 RED BLOOD CELLS UNIT: HCPCS

## 2020-01-22 PROCEDURE — 25000003 PHARM REV CODE 250: Performed by: STUDENT IN AN ORGANIZED HEALTH CARE EDUCATION/TRAINING PROGRAM

## 2020-01-22 PROCEDURE — 85610 PROTHROMBIN TIME: CPT

## 2020-01-22 PROCEDURE — 63600175 PHARM REV CODE 636 W HCPCS: Performed by: STUDENT IN AN ORGANIZED HEALTH CARE EDUCATION/TRAINING PROGRAM

## 2020-01-22 PROCEDURE — 83735 ASSAY OF MAGNESIUM: CPT | Mod: 91

## 2020-01-22 PROCEDURE — 82803 BLOOD GASES ANY COMBINATION: CPT

## 2020-01-22 PROCEDURE — 99233 PR SUBSEQUENT HOSPITAL CARE,LEVL III: ICD-10-PCS | Mod: ,,, | Performed by: NURSE PRACTITIONER

## 2020-01-22 PROCEDURE — 97164 PT RE-EVAL EST PLAN CARE: CPT

## 2020-01-22 PROCEDURE — 97168 OT RE-EVAL EST PLAN CARE: CPT

## 2020-01-22 PROCEDURE — 83605 ASSAY OF LACTIC ACID: CPT

## 2020-01-22 RX ORDER — IBUPROFEN 200 MG
16 TABLET ORAL
Status: DISCONTINUED | OUTPATIENT
Start: 2020-01-22 | End: 2020-02-04

## 2020-01-22 RX ORDER — GLUCAGON 1 MG
1 KIT INJECTION
Status: DISCONTINUED | OUTPATIENT
Start: 2020-01-22 | End: 2020-02-04

## 2020-01-22 RX ORDER — ESCITALOPRAM OXALATE 5 MG/1
10 TABLET ORAL DAILY
Status: DISCONTINUED | OUTPATIENT
Start: 2020-01-22 | End: 2020-02-04 | Stop reason: HOSPADM

## 2020-01-22 RX ORDER — HYDRALAZINE HYDROCHLORIDE 20 MG/ML
10 INJECTION INTRAMUSCULAR; INTRAVENOUS ONCE
Status: COMPLETED | OUTPATIENT
Start: 2020-01-22 | End: 2020-01-22

## 2020-01-22 RX ORDER — HYDROCODONE BITARTRATE AND ACETAMINOPHEN 500; 5 MG/1; MG/1
TABLET ORAL
Status: DISCONTINUED | OUTPATIENT
Start: 2020-01-22 | End: 2020-01-22

## 2020-01-22 RX ORDER — IPRATROPIUM BROMIDE AND ALBUTEROL SULFATE 2.5; .5 MG/3ML; MG/3ML
3 SOLUTION RESPIRATORY (INHALATION) EVERY 4 HOURS PRN
Status: DISCONTINUED | OUTPATIENT
Start: 2020-01-22 | End: 2020-01-31 | Stop reason: SDUPTHER

## 2020-01-22 RX ORDER — INSULIN ASPART 100 [IU]/ML
0-5 INJECTION, SOLUTION INTRAVENOUS; SUBCUTANEOUS
Status: DISCONTINUED | OUTPATIENT
Start: 2020-01-22 | End: 2020-02-04

## 2020-01-22 RX ORDER — IBUPROFEN 200 MG
24 TABLET ORAL
Status: DISCONTINUED | OUTPATIENT
Start: 2020-01-22 | End: 2020-02-04

## 2020-01-22 RX ADMIN — HYDRALAZINE HYDROCHLORIDE 10 MG: 20 INJECTION INTRAMUSCULAR; INTRAVENOUS at 11:01

## 2020-01-22 RX ADMIN — ASPIRIN 81 MG CHEWABLE TABLET 81 MG: 81 TABLET CHEWABLE at 09:01

## 2020-01-22 RX ADMIN — HYDROMORPHONE HYDROCHLORIDE 0.2 MG: 1 INJECTION, SOLUTION INTRAMUSCULAR; INTRAVENOUS; SUBCUTANEOUS at 01:01

## 2020-01-22 RX ADMIN — PANTOPRAZOLE SODIUM 40 MG: 40 INJECTION, POWDER, FOR SOLUTION INTRAVENOUS at 09:01

## 2020-01-22 RX ADMIN — ATORVASTATIN CALCIUM 80 MG: 20 TABLET, FILM COATED ORAL at 09:01

## 2020-01-22 RX ADMIN — HEPARIN SODIUM 5000 UNITS: 5000 INJECTION, SOLUTION INTRAVENOUS; SUBCUTANEOUS at 03:01

## 2020-01-22 RX ADMIN — HYDROMORPHONE HYDROCHLORIDE 0.2 MG: 1 INJECTION, SOLUTION INTRAMUSCULAR; INTRAVENOUS; SUBCUTANEOUS at 04:01

## 2020-01-22 RX ADMIN — HEPARIN SODIUM 5000 UNITS: 5000 INJECTION, SOLUTION INTRAVENOUS; SUBCUTANEOUS at 09:01

## 2020-01-22 RX ADMIN — ESCITALOPRAM 10 MG: 5 TABLET, FILM COATED ORAL at 04:01

## 2020-01-22 RX ADMIN — CLOPIDOGREL BISULFATE 75 MG: 75 TABLET, FILM COATED ORAL at 09:01

## 2020-01-22 RX ADMIN — HEPARIN SODIUM 5000 UNITS: 5000 INJECTION, SOLUTION INTRAVENOUS; SUBCUTANEOUS at 05:01

## 2020-01-22 NOTE — SUBJECTIVE & OBJECTIVE
Interval History: SLED discontinued and held overnight. Patient net even over the last 24 hrs. Patient sitting up in chair this AM, a little uncomfortable due to transfer from bed to chair, placed on NC for comfort. SBP stable off of pressor support. Remains only on  and precedex for agitation. Electrolytes an acid base stable. Patient remains anuric.     Review of patient's allergies indicates:   Allergen Reactions    Contrast media      Kidney injury    Pcn [penicillins]      Rash; tolerated ceftriaxone on 1/13/20     Current Facility-Administered Medications   Medication Frequency    0.9%  NaCl infusion (for blood administration) Q24H PRN    acetaminophen tablet 650 mg Q4H PRN    acetaminophen tablet 650 mg Q6H PRN    aspirin chewable tablet 81 mg Daily    atorvastatin tablet 80 mg QHS    bisacodyl suppository 10 mg Q12H PRN    clopidogrel tablet 75 mg Daily    dexmedetomidine (PRECEDEX) 400mcg/100mL 0.9% NaCL infusion Continuous    dextrose 10% (D10W) Bolus PRN    dextrose 10% (D10W) Bolus PRN    dextrose 5 % and 0.9 % NaCl with KCl 20 mEq infusion Continuous    DOBUTamine 500mg in D5W 250mL infusion (premix) (NON-TITRATING) Continuous    EPINEPHrine (ADRENALIN) 5 mg in sodium chloride 0.9% 250 mL infusion Continuous    glucagon (human recombinant) injection 1 mg PRN    heparin (porcine) injection 5,000 Units Q8H    HYDROmorphone injection 0.2 mg Q1H PRN    insulin aspart U-100 pen 0-5 Units Q4H PRN    insulin detemir U-100 pen 5 Units Daily    metoclopramide HCl injection 10 mg Q6H PRN    ondansetron disintegrating tablet 8 mg Q8H PRN    oxyCODONE immediate release tablet 10 mg Q4H PRN    oxyCODONE immediate release tablet 5 mg Q4H PRN    pantoprazole injection 40 mg BID    polyethylene glycol packet 17 g Daily    scopolamine 1.3-1.5 mg (1 mg over 3 days) 1 patch Q3 Days       Objective:     Vital Signs (Most Recent):  Temp: 99 °F (37.2 °C) (01/22/20 0730)  Pulse: 81 (01/22/20  0730)  Resp: (!) 24 (01/22/20 0730)  BP: 121/60 (01/21/20 1900)  SpO2: 100 % (01/22/20 0730)  O2 Device (Oxygen Therapy): nasal cannula (01/22/20 0730) Vital Signs (24h Range):  Temp:  [98.1 °F (36.7 °C)-99 °F (37.2 °C)] 99 °F (37.2 °C)  Pulse:  [79-88] 81  Resp:  [18-43] 24  SpO2:  [93 %-100 %] 100 %  BP: (121)/(60) 121/60  Arterial Line BP: ()/(39-61) 125/57     Weight: 103.7 kg (228 lb 9.9 oz) (01/21/20 0400)  Body mass index is 39.24 kg/m².  Body surface area is 2.16 meters squared.    I/O last 3 completed shifts:  In: 7407.2 [I.V.:6230.4; Blood:206; NG/GT:120; IV Piggyback:850.8]  Out: 8123 [Urine:15; Drains:150; Other:7938; Chest Tube:20]    Physical Exam   Constitutional: She is oriented to person, place, and time. She appears well-developed and well-nourished. She is cooperative. She appears ill. No distress. Nasal cannula in place.   HENT:   Head: Normocephalic and atraumatic.   Right Ear: External ear normal.   Left Ear: External ear normal.   Mouth/Throat: Oropharynx is clear and moist. No oropharyngeal exudate.   Eyes: Right eye exhibits no discharge. Left eye exhibits no discharge. No scleral icterus.   Neck: Neck supple.   Cardiovascular: Normal rate.   No murmur heard.  Pulmonary/Chest: Effort normal. She has no wheezes. She has rales.   Abdominal: Soft. She exhibits no distension and no mass. There is no guarding. No hernia.   Musculoskeletal: Normal range of motion. She exhibits edema. She exhibits no tenderness or deformity.   Neurological: She is alert and oriented to person, place, and time.   Skin: Skin is warm and dry.       Significant Labs:  CBC:   Recent Labs   Lab 01/22/20  0109   WBC 12.25   RBC 2.45*   HGB 7.4*   HCT 23.7*      MCV 97   MCH 30.2   MCHC 31.2*     CMP:   Recent Labs   Lab 01/22/20  0109   *   CALCIUM 8.5*   ALBUMIN 2.4*   PROT 6.8      K 4.3   CO2 22*      BUN 6   CREATININE 1.4   ALKPHOS 311*   *   *   BILITOT 2.2*

## 2020-01-22 NOTE — ASSESSMENT & PLAN NOTE
At time of re-consult: Ms Connelly is a 53 y/o female who is s/p CABG. Nephrology re-consulted for ROSLYN, decreased UOP, and concern for volume overload. Patient is now anuric with sCr trending up (4.4 on 1/16/20). Patient with metabolic acidosis on ABG/serum CO2 of 19. Patient now with concern for volume overload and now intubated after progressively increased oxygen requirements. Concern for pulmonary edema on CXR.     Plan/recommendations:   -No urgent need for dialysis today. Patient net even, on intermittent NC overnight. No distress this AM. Patient remains on  and precedex infusions.   -Patient remains anuric, will still require RRT  -Will plan for a intermittent HD trial tomorrow for metabolic clearance and volume management.   -Strict I/O's   -Renally dose medications   -Avoid nephrotoxic medications  -Daily renal function panels   -Maintain Hgb >7.0  -Will discuss with Dr. Lange

## 2020-01-22 NOTE — PT/OT/SLP RE-EVAL
Physical Therapy Re-evaluation and treatment    Patient Name:  Suyapa Connelly   MRN:  [id number]    Recommendations:     Discharge Recommendations:  rehabilitation facility   Discharge Equipment Recommendations: (will determine DME needs closer to discharge)   Barriers to discharge: Decreased caregiver support family will not be able to assist at current functional level.     Assessment:     Suyapa Connelly is a 54 y.o. female admitted with a medical diagnosis of Acute coronary syndrome.  She presents with the following impairments/functional limitations:  impaired endurance, impaired balance, gait instability, decreased safety awareness, impaired cognition, decreased coordination, impaired functional mobilty pt goldie treatment fair and will benefit from cont skilled PT 4x/wk to progress physically. Pt will need inpt rehab when medically stable to maximize functional mobility. Pt was evaluated 1/3 with dx of acute coronary syndrome. Pt is s/p CABG 1/14.    SOCIAL: per initial evaluation: pt is homemaker and lives with her  who can assist. They live in 1 story with 5 steps and L handrail to entrance. Pt does not own any DME.     Rehab Prognosis:  good; patient would benefit from acute skilled PT services to address these deficits and reach maximum level of function.      Recent Surgery: Procedure(s) (LRB):  CORONARY ARTERY BYPASS GRAFT (CABG) x 1     Off Pump (N/A) 8 Days Post-Op    Plan:     During this hospitalization, patient to be seen 4 x/week to address the above listed problems via gait training, therapeutic activities  · Plan of Care Expires:  02/21/20   Plan of Care Reviewed with: patient    Subjective     Communicated with nurse prior to session.  Patient found supine with telemetry, arterial line, central line, blood pressure cuff, pulse ox (continuous), snow catheter upon PT entry to room, agreeable to evaluation.      Chief Complaint: pt had no complaints during treatment.   Patient comments/goals:   To get better and go home.   Pain/Comfort:  · Pain Rating 1: 0/10  · Pain Rating Post-Intervention 1: 0/10    Patients cultural, spiritual, Advent conflicts given the current situation: no      Objective:     Patient found with: telemetry, arterial line, central line, blood pressure cuff, pulse ox (continuous), snow catheter     General Precautions: Standard, fall, sternal   Orthopedic Precautions:N/A   Braces:       Exams:  · Cognitive Exam:  Patient is oriented to Person, Place,   · RLE ROM: WFL  · RLE Strength: WFL  · LLE ROM: WFL  · LLE Strength: WFL    Functional Mobility:  · Bed Mobility:   Pt needed verbal cues for functional mobility with sternal precautions.   · Rolling Right: maximal assistance  · Supine to Sit: maximal assistance  ·   · Transfers:     · Sit to Stand:  maximal assistance with hand-held assist. Pt stood 5 times 2nd to needing to be continuously cleaned.  · Bed to Chair: maximal assistance with  hand-held assist  using  Stand Pivot    AM-PAC 6 CLICK MOBILITY  Total Score:10       Therapeutic Activities and Exercises:   pt received verbal and written instructions in sternal precautions and will need re-instruction.     Patient left up in chair with all lines intact and call button in reach.    GOALS:   Multidisciplinary Problems     Physical Therapy Goals        Problem: Physical Therapy Goal    Goal Priority Disciplines Outcome Goal Variances Interventions   Physical Therapy Goal     PT, PT/OT Ongoing, Progressing     Description:  Goals to be met by: 20    Patient will increase functional independence with mobility by performin. Supine to sit with Contact Guard Assistance -not met  2. Sit to stand transfer with Contact Guard Assistance -not met  3. Gait  x 150 feet with Contact Guard Assistance -not met  4. Ascend/descend 5 stair with left Handrails Contact Guard Assistance -not met                Multidisciplinary Problems (Resolved)        Problem: Physical Therapy Goal     Goal Priority Disciplines Outcome Goal Variances Interventions   Physical Therapy Goal   (Resolved)     PT, PT/OT Met     Description:  Eval only                    History:     Past Medical History:   Diagnosis Date    Anxiety     Depression     Diabetes mellitus     type 2    GERD (gastroesophageal reflux disease)     Hyperlipemia     Hypertension     Myocardial infarction 2010    minor-caused by stress per pt.       Past Surgical History:   Procedure Laterality Date    Angiogram - Right Extremity Right 7/9/15    angiogram-left leg  10/6/15    CATHETERIZATION OF BOTH LEFT AND RIGHT HEART N/A 12/18/2019    Procedure: CATHETERIZATION, HEART, BOTH LEFT AND RIGHT;  Surgeon: Que Fernando III, MD;  Location: [facility] CATH LAB;  Service: Cardiology;  Laterality: N/A;    CORONARY ANGIOGRAPHY N/A 12/18/2019    Procedure: ANGIOGRAM, CORONARY ARTERY;  Surgeon: Que Fernando III, MD;  Location: [facility] CATH LAB;  Service: Cardiology;  Laterality: N/A;    CORONARY ARTERY BYPASS GRAFT (CABG) N/A 1/14/2020    Procedure: CORONARY ARTERY BYPASS GRAFT (CABG) x 1     Off Pump;  Surgeon: Huang Altamirano MD;  Location: [address];  Service: Cardiovascular;  Laterality: N/A;       Time Tracking:     PT Received On: 01/22/20  PT Start Time: 0844     PT Stop Time: 0910  PT Total Time (min): 26 min     Billable Minutes: Re-eval 8 min and Therapeutic Activity 18 min      Kelli Martinez, PT  01/22/2020

## 2020-01-22 NOTE — PLAN OF CARE
Problem: Physical Therapy Goal  Goal: Physical Therapy Goal  Description  Goals to be met by: 20    Patient will increase functional independence with mobility by performin. Supine to sit with Contact Guard Assistance -not met  2. Sit to stand transfer with Contact Guard Assistance -not met  3. Gait  x 150 feet with Contact Guard Assistance -not met  4. Ascend/descend 5 stair with left Handrails Contact Guard Assistance -not met     Outcome: Ongoing, Progressing   Re-evaluation completed and goals appropriate. Kelli Martinez, PT  2020

## 2020-01-22 NOTE — PROGRESS NOTES
[facility]  Critical Care - Surgery  Progress Note    Patient Name: Suyapa Connelly  MRN: [id number]  Admission Date: 1/2/2020  Hospital Length of Stay: 20 days  Code Status: Full Code  Attending Provider: Huang Altamirano MD  Primary Care Provider: Erlinda Rahman NP   Principal Problem: Acute coronary syndrome    Subjective:     Hospital/ICU Course:  1/15: POD1. ON nausea without emesis. Zofran. 2L albumin, 5 amps bicarb. Clinically improving. 250 albumin running.   1/16: Increasing pressor requirements. Intubated for poor oxygenation, Left IJ trialysis placed for CRRT. Nitric added for RV support. Transfused pRBC x2 overnight.   1/19: Decreasing vent settings. Gases continue to look good. Decreasing pressor requirements. CRRT continues without issue. Paralysis discontinued.   1/20: Stayed on PS @ 8, FiO2 @ 40% most of the day. Minimal use of epi. CRRT continues.   1/21: Extubated. CRRT discontinued. CTs pulled.     Interval History/Significant Events: Doing well from a respiratory standpoint. Pt complaining of left arm pain but unable to further describe it. Struggling with a bit of delirium still.     Follow-up For: Procedure(s) (LRB):  CORONARY ARTERY BYPASS GRAFT (CABG) x 1     Off Pump (N/A)    Post-Operative Day: 7 Days Post-Op    Objective:     Vital Signs (Most Recent):  Temp: 99.1 °F (37.3 °C) (01/20/20 2300)  Pulse: 77 (01/21/20 0600)  Resp: 18 (01/20/20 1713)  BP: (!) 115/56 (01/20/20 1900)  SpO2: 95 % (01/21/20 0600) Vital Signs (24h Range):  Temp:  [97.9 °F (36.6 °C)-99.7 °F (37.6 °C)] 99.1 °F (37.3 °C)  Pulse:  [71-87] 77  Resp:  [16-23] 18  SpO2:  [85 %-100 %] 95 %  BP: (115-143)/(56-67) 115/56  Arterial Line BP: ()/(42-65) 126/56     Weight: 103.7 kg (228 lb 9.9 oz)  Body mass index is 39.24 kg/m².      Intake/Output Summary (Last 24 hours) at 1/21/2020 0613  Last data filed at 1/21/2020 0600  Gross per 24 hour   Intake 6933.85 ml   Output 8449 ml   Net -1515.15 ml        Physical Exam   Constitutional: She appears well-developed and well-nourished. She is cooperative.   HENT:   Head: Normocephalic and atraumatic.   Eyes: Pupils are equal, round, and reactive to light.   Neck: Normal range of motion. Neck supple.   Bilateral CVC, dressings CDI   Cardiovascular: Normal rate and regular rhythm.   Abdominal: Soft. She exhibits no distension. There is no rebound.   Musculoskeletal: Normal range of motion. She exhibits edema. She exhibits no deformity.   Pain to touch of forearm.    Neurological: She is alert.   sedated   Skin: Skin is warm and dry. No rash noted. No erythema.       Lines/Drains/Airways     Central Venous Catheter Line                 Introducer right internal jugular -- days         Percutaneous Central Line Insertion/Assessment - triple lumen  right internal jugular -- days         Trialysis (Dialysis) Catheter left internal jugular -- days         Percutaneous Central Line Insertion/Assessment - double lumen  01/14/20 0725 6 days          Drain                 Chest Tube 01/14/20 1017 1 Left Pleural 19 Fr. 6 days         Urethral Catheter 01/14/20 0720 Non-latex;Temperature probe;Straight-tip 14 Fr. 6 days         Y Chest Tube 1 and 2 01/14/20 1018 1 Anterior Mediastinal 19 Fr. 2 Anterior Mediastinal 19 Fr. 6 days         NG/OG Tube 01/16/20 1446 orogastric 4 days          Airway                 Airway - Non-Surgical 01/16/20 1442 Endotracheal Tube 4 days          Arterial Line                 Arterial Line 01/16/20 2300 Right Radial 4 days          Line                 Pacer Wires 01/14/20 1009 6 days                Significant Labs:    CBC/Anemia Profile:  Recent Labs   Lab 01/20/20  0300 01/20/20  0359 01/21/20  0406 01/21/20  0501   WBC 9.73  --  12.31  --    HGB 8.0*  --  7.6*  --    HCT 26.7* 24* 25.3* 23*   *  --  141*  --    MCV 98  --  97  --    RDW 17.4*  --  17.2*  --         Chemistries:  Recent Labs   Lab 01/20/20  0300 01/20/20  1400  01/20/20  2205 01/21/20  0406     141 141 139 140  140   K 4.8  4.8 4.8 4.6 4.3  4.3     105 106 106 106  106   CO2 27  27 26 26 24  24   BUN 3*  3* 2* 2* 2*  2*   CREATININE 0.7  0.7 0.7 0.7 0.6  0.6   CALCIUM 8.7  8.7 8.7 8.4* 8.5*  8.5*   ALBUMIN 2.6*  2.6* 2.5* 2.4* 2.4*  2.4*   PROT 6.8  --   --  6.7   BILITOT 2.7*  --   --  2.7*   ALKPHOS 163*  --   --  187*   *  --   --  385*   *  --   --  131*   MG 2.0 1.9 2.0 1.9   PHOS 2.4* 2.5* 2.9 2.3*       ABGs:   Recent Labs   Lab 01/21/20  0501   PH 7.498*   PCO2 31.8*   HCO3 24.6   POCSATURATED 97   BE 1     Coagulation:   Recent Labs   Lab 01/21/20  0406   INR 0.9     Lactic Acid: No results for input(s): LACTATE in the last 48 hours.    Significant Imaging:  I have reviewed all pertinent imaging results/findings within the past 24 hours.    Assessment/Plan:     Acute on chronic combined systolic (congestive) and diastolic (congestive) heart failure  Neuro:   - PRN fentanyl   - Multimodal pain regimen once tolerating PO     Pulmonary:   - Extubated 1/21  - CXR prn  - ABG prn  - Pulmonary toilet  - IS     Cardiac:  - Epi off  - Dobutamine @ 5  - Echo 1/17:            Mildly decreased left ventricular systolic function. The estimated ejection fraction is 50%.  · Concentric left ventricular remodeling.  · Local segmental wall motion abnormalities.  · Septal wall has abnormal motion.  · Grade I (mild) left ventricular diastolic dysfunction consistent with impaired relaxation.  · Normal right ventricular systolic function.  · Severe tricuspid regurgitation.   -Pacer turned off    Renal:   - CRRT discontinued 1/21  - BUN/Cr increased, CTM  - Kerns in place for close monitoring of UOP  - Possibly Lasix later tonight     Infectious Disease:   - Afebrile, WBC 12  - Antibiotics: Perioperative     Hematology/Oncology:  - monitor H/H, today 7.4/23.7  - pRBC x1 today     Endocrine:  - Endocrine consulted, keep blood glucose goals  140-180     Fluids/Electrolytes/Nutrition/GI:   - D5 NS 20 KCl @ 5  - Trend CMP  - LFTs trending down  - Replace Lytes PRN   - NPO, SLP to evaluate today  - Scopolamine patch, PRN Zofran     Prophylaxis:  - GI: protonix daily     Dispo:  Continue ICU care, PT/OT, possibly PMR consult. D/C RIJAMISON CVC  Primary team: [facility]    Critical care was time spent personally by me on the following activities: development of treatment plan with patient or surrogate and bedside caregivers, discussions with consultants, evaluation of patient's response to treatment, examination of patient, ordering and performing treatments and interventions, ordering and review of laboratory studies, ordering and review of radiographic studies, pulse oximetry, re-evaluation of patient's condition.  This critical care time did not overlap with that of any other provider or involve time for any procedures.     Negrita Cordova MD  Critical Care - Surgery  [facility]

## 2020-01-22 NOTE — SUBJECTIVE & OBJECTIVE
"Interval HPI:   Overnight events: Remains in SICU. BG below goal ranges on current SQ insulin regimen. Diet progressed to full liquid.   Eating:   full liquid  Nausea: No  Hypoglycemia and intervention: No  Fever: No  TPN and/or TF: No  If yes, type of TF/TPN and rate: none    /60 (BP Location: Left arm, Patient Position: Lying)   Pulse 80   Temp 98.8 °F (37.1 °C) (Oral)   Resp (!) 27   Ht 5' 4" (1.626 m)   Wt 103.7 kg (228 lb 9.9 oz)   LMP 09/30/2015 (Exact Date)   SpO2 100%   Breastfeeding? No   BMI 39.24 kg/m²     Labs Reviewed and Include    Recent Labs   Lab 01/22/20  0109   *   CALCIUM 8.5*   ALBUMIN 2.4*   PROT 6.8      K 4.3   CO2 22*      BUN 6   CREATININE 1.4   ALKPHOS 311*   *   *   BILITOT 2.2*     Lab Results   Component Value Date    WBC 12.25 01/22/2020    HGB 7.4 (L) 01/22/2020    HCT 23.7 (L) 01/22/2020    MCV 97 01/22/2020     01/22/2020     No results for input(s): TSH, FREET4 in the last 168 hours.  Lab Results   Component Value Date    HGBA1C 9.7 (H) 01/03/2020       Nutritional status:   Body mass index is 39.24 kg/m².  Lab Results   Component Value Date    ALBUMIN 2.4 (L) 01/22/2020    ALBUMIN 2.4 (L) 01/21/2020    ALBUMIN 2.4 (L) 01/21/2020    ALBUMIN 2.4 (L) 01/21/2020     No results found for: PREALBUMIN    Estimated Creatinine Clearance: 54.5 mL/min (based on SCr of 1.4 mg/dL).    Accu-Checks  Recent Labs     01/20/20  1723 01/20/20  2028 01/21/20  0011 01/21/20  0417 01/21/20  0715 01/21/20  1339 01/21/20  1822 01/21/20  1929 01/22/20  0117 01/22/20  0437   POCTGLUCOSE 111* 113* 88 102 97 95 78 103 114* 95       Current Medications and/or Treatments Impacting Glycemic Control  Immunotherapy:    Immunosuppressants     None        Steroids:   Hormones (From admission, onward)    None        Pressors:    Autonomic Drugs (From admission, onward)    Start     Stop Route Frequency Ordered    01/16/20 1430  succinylcholine (ANECTINE) 20 " mg/mL injection     Note to Pharmacy:  Created by cabinet override    01/17 0244   01/16/20 1430    01/14/20 1853  rocuronium 10 mg/mL injection     Note to Pharmacy:  Created by cabinet override    01/15 0659   01/14/20 1853    01/14/20 1230  EPINEPHrine (ADRENALIN) 5 mg in sodium chloride 0.9% 250 mL infusion     Question Answer Comment   Titrate by: (in mcg/kg/min) 0.01    Titrate interval: (in minutes) 5    Titrate to maintain: (SBP or MAP or Cardiac Index) SBP    Maximum dose: (in mcg/kg/min) 2        -- IV Continuous 01/14/20 1129        Hyperglycemia/Diabetes Medications:   Antihyperglycemics (From admission, onward)    Start     Stop Route Frequency Ordered    01/22/20 0915  insulin detemir U-100 pen 5 Units      -- SubQ Daily 01/22/20 0904    01/15/20 0946  insulin aspart U-100 pen 0-5 Units      -- SubQ Every 4 hours PRN 01/15/20 0847

## 2020-01-22 NOTE — PROGRESS NOTES
"[facility]  Endocrinology  Progress Note    Admit Date: 1/2/2020     Reason for Consult: Management of T2DM, Hyperglycemia     Surgical Procedure and Date: n/a    Diabetes diagnosis year:  2006    Home Diabetes Medications:  Lantus 50 units daily and novolog 9 units with meals   Lab Results   Component Value Date    HGBA1C 9.7 (H) 01/03/2020         How often checking glucose at home? 1-3 x day   BG readings on regimen: 100-200 typically; sometimes in 300s   Hypoglycemia on the regimen?  Yes occasionally - usually overnight   Missed doses on regimen?  Yes    Diabetes Complications include:     Hyperglycemia, Hypoglycemia  and Diabetic peripheral neuropathy     Complicating diabetes co morbidities:   History of MI and CHF      HPI:   Patient is a 54 y.o. female with a diagnosis of uncontrolled type 2 DM. Also with CHF, PAD, HTN, HLD, and CAD. Patient presented with chest pain and diaphoresis. Also with nauseated, diaphoretic, a mild headache, epigastric pain, with a vague "heart burn" like feeling in her chest. Patient admitted for treatment and further workup. Endocrinology consulted for BG/ DM management.             Interval HPI:   Overnight events: Remains in SICU. BG below goal ranges on current SQ insulin regimen. Diet progressed to full liquid.   Eating:   full liquid  Nausea: No  Hypoglycemia and intervention: No  Fever: No  TPN and/or TF: No  If yes, type of TF/TPN and rate: none    /60 (BP Location: Left arm, Patient Position: Lying)   Pulse 80   Temp 98.8 °F (37.1 °C) (Oral)   Resp (!) 27   Ht 5' 4" (1.626 m)   Wt 103.7 kg (228 lb 9.9 oz)   LMP 09/30/2015 (Exact Date)   SpO2 100%   Breastfeeding? No   BMI 39.24 kg/m²      Labs Reviewed and Include    Recent Labs   Lab 01/22/20  0109   *   CALCIUM 8.5*   ALBUMIN 2.4*   PROT 6.8      K 4.3   CO2 22*      BUN 6   CREATININE 1.4   ALKPHOS 311*   *   *   BILITOT 2.2*     Lab Results   Component " Value Date    WBC 12.25 01/22/2020    HGB 7.4 (L) 01/22/2020    HCT 23.7 (L) 01/22/2020    MCV 97 01/22/2020     01/22/2020     No results for input(s): TSH, FREET4 in the last 168 hours.  Lab Results   Component Value Date    HGBA1C 9.7 (H) 01/03/2020       Nutritional status:   Body mass index is 39.24 kg/m².  Lab Results   Component Value Date    ALBUMIN 2.4 (L) 01/22/2020    ALBUMIN 2.4 (L) 01/21/2020    ALBUMIN 2.4 (L) 01/21/2020    ALBUMIN 2.4 (L) 01/21/2020     No results found for: PREALBUMIN    Estimated Creatinine Clearance: 54.5 mL/min (based on SCr of 1.4 mg/dL).    Accu-Checks  Recent Labs     01/20/20  1723 01/20/20  2028 01/21/20  0011 01/21/20  0417 01/21/20  0715 01/21/20  1339 01/21/20  1822 01/21/20  1929 01/22/20  0117 01/22/20  0437   POCTGLUCOSE 111* 113* 88 102 97 95 78 103 114* 95       Current Medications and/or Treatments Impacting Glycemic Control  Immunotherapy:    Immunosuppressants     None        Steroids:   Hormones (From admission, onward)    None        Pressors:    Autonomic Drugs (From admission, onward)    Start     Stop Route Frequency Ordered    01/16/20 1430  succinylcholine (ANECTINE) 20 mg/mL injection     Note to Pharmacy:  Created by cabinet override    01/17 0244   01/16/20 1430    01/14/20 1853  rocuronium 10 mg/mL injection     Note to Pharmacy:  Created by cabinet override    01/15 0659   01/14/20 1853    01/14/20 1230  EPINEPHrine (ADRENALIN) 5 mg in sodium chloride 0.9% 250 mL infusion     Question Answer Comment   Titrate by: (in mcg/kg/min) 0.01    Titrate interval: (in minutes) 5    Titrate to maintain: (SBP or MAP or Cardiac Index) SBP    Maximum dose: (in mcg/kg/min) 2        -- IV Continuous 01/14/20 1129        Hyperglycemia/Diabetes Medications:   Antihyperglycemics (From admission, onward)    Start     Stop Route Frequency Ordered    01/22/20 0915  insulin detemir U-100 pen 5 Units      -- SubQ Daily 01/22/20 0904    01/15/20 0946  insulin aspart  "U-100 pen 0-5 Units      -- SubQ Every 4 hours PRN 01/15/20 0847          ASSESSMENT and PLAN    * Acute coronary syndrome  Managed per primary team  Avoid hypoglycemia        Type 2 diabetes mellitus with hyperglycemia, with long-term current use of insulin  BG goal 140 - 180   Fasting BG below goal ranges this morning.    Decrease Levemir to 5 units daily.  (20% dose decrease).   Low Dose SQ Insulin Correction Scale   BG monitoring ac/hs    Will monitor for prandial excursions with diet progression    ** Please call Endocrine for any BG related issues **  ** Please notify Endocrine for any change and/or advance in diet**      Discharge Planning:     TBD. Please notify endocrinology prior to discharge. Patient will need adjustments to home insulin regimen.     Tentative:     Levemir dose TBD    Novolog dose TBD    Low Dose SQ Insulin Correction Scale.    BG Monitoring AC/HS     PRN Insurance preferred diabetes testing supplies    PRN Ultra-Fine Junie Pen Needles 4mm x 32 G (5/32" x 0.23mm).     Patient request outpatient follow-up with [facility] endo clinic for continued DM management.          ROSLYN (acute kidney injury)  Caution with insulin stacking        Mixed hyperlipidemia  Managed per primary team  Condition may cause insulin resistance         Essential hypertension  Managed per primary team  Condition may cause insulin resistance         Acute on chronic combined systolic (congestive) and diastolic (congestive) heart failure  Optimize glucose control and  avoid hypoglycemia          CAD (coronary artery disease)  Optimize glucose control and  avoid hypoglycemia            Mary Baptiste NP  Endocrinology  [facility]  "

## 2020-01-22 NOTE — ASSESSMENT & PLAN NOTE
"BG goal 140 - 180   Fasting BG below goal ranges this morning.    Decrease Levemir to 5 units daily.  (20% dose decrease).   Low Dose SQ Insulin Correction Scale   BG monitoring ac/hs    Will monitor for prandial excursions with diet progression    ** Please call Endocrine for any BG related issues **  ** Please notify Endocrine for any change and/or advance in diet**      Discharge Planning:     TBD. Please notify endocrinology prior to discharge. Patient will need adjustments to home insulin regimen.     Tentative:     Levemir dose TBD    Novolog dose TBD    Low Dose SQ Insulin Correction Scale.    BG Monitoring AC/HS     PRN Insurance preferred diabetes testing supplies    PRN Ultra-Fine Junie Pen Needles 4mm x 32 G (5/32" x 0.23mm).     Patient request outpatient follow-up with [facility] endo clinic for continued DM management.        "

## 2020-01-22 NOTE — NURSING
Patient assisted up to seated position at edge of bed. Patient and  educated on sternal precautions, verbalized understanding though it will definitely need reinforced. She tolerated seated position well with very little assistance required. After approximately 20 minutes, patient was assisted back to bed.

## 2020-01-22 NOTE — PROGRESS NOTES
[facility]  Nephrology  Progress Note    Patient Name: Suyapa Connelly  MRN: [id number]  Admission Date: 1/2/2020  Hospital Length of Stay: 20 days  Attending Provider: Huang Altamirano MD   Primary Care Physician: Erlinda Rahman NP  Principal Problem:Acute coronary syndrome    Subjective:     HPI: 53 y/o AAF with known medical medical issues of chronic combined systolic and diastolic HF (EF 35% Grade II DD), CAD S/P MI 2010, Angiogram 12/18/19 mid LAD 90% stenosis, mid OM2 75% stenosis, and Ostial D1 70% stenosis LVDEP 25 mmHg, PAD S/P PTA with stents BLE 2015, IDDM who presented to the ED with SOB and chest pain, found to have an NSTEMI which was treated medically with asa, heparin gtt. CTA of the lower extremities performed on 01/03 to investigate vasculature useful for CABG. Creatinine 3.6 up from a baseline of 1.2. She reports decreased urine output since the CTA but no increased swelling of the lower extremities. No SOB. She an episode of hypotension on 01/04 with a systolic of 80. She was on Lasix IV BID, last dose on 01/04 at 9:00 am. Nephrology consulted for oliguric ROSLYN.      Interval History: SLED discontinued and held overnight. Patient net even over the last 24 hrs. Patient sitting up in chair this AM, a little uncomfortable due to transfer from bed to chair, placed on NC for comfort. SBP stable off of pressor support. Remains only on  and precedex for agitation. Electrolytes an acid base stable. Patient remains anuric.     Review of patient's allergies indicates:   Allergen Reactions    Contrast media      Kidney injury    Pcn [penicillins]      Rash; tolerated ceftriaxone on 1/13/20     Current Facility-Administered Medications   Medication Frequency    0.9%  NaCl infusion (for blood administration) Q24H PRN    acetaminophen tablet 650 mg Q4H PRN    acetaminophen tablet 650 mg Q6H PRN    aspirin chewable tablet 81 mg Daily    atorvastatin tablet 80 mg QHS     bisacodyl suppository 10 mg Q12H PRN    clopidogrel tablet 75 mg Daily    dexmedetomidine (PRECEDEX) 400mcg/100mL 0.9% NaCL infusion Continuous    dextrose 10% (D10W) Bolus PRN    dextrose 10% (D10W) Bolus PRN    dextrose 5 % and 0.9 % NaCl with KCl 20 mEq infusion Continuous    DOBUTamine 500mg in D5W 250mL infusion (premix) (NON-TITRATING) Continuous    EPINEPHrine (ADRENALIN) 5 mg in sodium chloride 0.9% 250 mL infusion Continuous    glucagon (human recombinant) injection 1 mg PRN    heparin (porcine) injection 5,000 Units Q8H    HYDROmorphone injection 0.2 mg Q1H PRN    insulin aspart U-100 pen 0-5 Units Q4H PRN    insulin detemir U-100 pen 5 Units Daily    metoclopramide HCl injection 10 mg Q6H PRN    ondansetron disintegrating tablet 8 mg Q8H PRN    oxyCODONE immediate release tablet 10 mg Q4H PRN    oxyCODONE immediate release tablet 5 mg Q4H PRN    pantoprazole injection 40 mg BID    polyethylene glycol packet 17 g Daily    scopolamine 1.3-1.5 mg (1 mg over 3 days) 1 patch Q3 Days       Objective:     Vital Signs (Most Recent):  Temp: 99 °F (37.2 °C) (01/22/20 0730)  Pulse: 81 (01/22/20 0730)  Resp: (!) 24 (01/22/20 0730)  BP: 121/60 (01/21/20 1900)  SpO2: 100 % (01/22/20 0730)  O2 Device (Oxygen Therapy): nasal cannula (01/22/20 0730) Vital Signs (24h Range):  Temp:  [98.1 °F (36.7 °C)-99 °F (37.2 °C)] 99 °F (37.2 °C)  Pulse:  [79-88] 81  Resp:  [18-43] 24  SpO2:  [93 %-100 %] 100 %  BP: (121)/(60) 121/60  Arterial Line BP: ()/(39-61) 125/57     Weight: 103.7 kg (228 lb 9.9 oz) (01/21/20 0400)  Body mass index is 39.24 kg/m².  Body surface area is 2.16 meters squared.    I/O last 3 completed shifts:  In: 7407.2 [I.V.:6230.4; Blood:206; NG/GT:120; IV Piggyback:850.8]  Out: 8123 [Urine:15; Drains:150; Other:7938; Chest Tube:20]    Physical Exam   Constitutional: She is oriented to person, place, and time. She appears well-developed and well-nourished. She is cooperative. She appears  ill. No distress. Nasal cannula in place.   HENT:   Head: Normocephalic and atraumatic.   Right Ear: External ear normal.   Left Ear: External ear normal.   Mouth/Throat: Oropharynx is clear and moist. No oropharyngeal exudate.   Eyes: Right eye exhibits no discharge. Left eye exhibits no discharge. No scleral icterus.   Neck: Neck supple.   Cardiovascular: Normal rate.   No murmur heard.  Pulmonary/Chest: Effort normal. She has no wheezes. She has rales.   Abdominal: Soft. She exhibits no distension and no mass. There is no guarding. No hernia.   Musculoskeletal: Normal range of motion. She exhibits edema. She exhibits no tenderness or deformity.   Neurological: She is alert and oriented to person, place, and time.   Skin: Skin is warm and dry.       Significant Labs:  CBC:   Recent Labs   Lab 01/22/20  0109   WBC 12.25   RBC 2.45*   HGB 7.4*   HCT 23.7*      MCV 97   MCH 30.2   MCHC 31.2*     CMP:   Recent Labs   Lab 01/22/20  0109   *   CALCIUM 8.5*   ALBUMIN 2.4*   PROT 6.8      K 4.3   CO2 22*      BUN 6   CREATININE 1.4   ALKPHOS 311*   *   *   BILITOT 2.2*            Assessment/Plan:     * Acute coronary syndrome  - per primary team     ROSLYN (acute kidney injury)  At time of re-consult: Ms Connelly is a 55 y/o female who is s/p CABG. Nephrology re-consulted for ROSLYN, decreased UOP, and concern for volume overload. Patient is now anuric with sCr trending up (4.4 on 1/16/20). Patient with metabolic acidosis on ABG/serum CO2 of 19. Patient now with concern for volume overload and now intubated after progressively increased oxygen requirements. Concern for pulmonary edema on CXR.     Plan/recommendations:   -No urgent need for dialysis today. Patient net even, on intermittent NC overnight. No distress this AM. Patient remains on  and precedex infusions.   -Patient remains anuric, will still require RRT  -Will plan for a intermittent HD trial tomorrow for metabolic  clearance and volume management.   -Strict I/O's   -Renally dose medications   -Avoid nephrotoxic medications  -Daily renal function panels   -Maintain Hgb >7.0  -Will discuss with Dr. Lange       Thank you for your consult. I will follow-up with patient. Please contact us if you have any additional questions.    Sarah Saba DNP, FNP-C  Nephrology  [facility]

## 2020-01-22 NOTE — PT/OT/SLP RE-EVAL
"Occupational Therapy   Re-evaluation    Name: Suyapa Connelly  MRN: [id number]  Admitting Diagnosis:  Acute coronary syndrome 8 Days Post-Op    Recommendations:     Discharge Recommendations: rehabilitation facility  Discharge Equipment Recommendations:  none  Barriers to discharge:  None    Assessment:     Suyapa Connelly is a 54 y.o. female with a medical diagnosis of Acute coronary syndrome.  She presents s/p CABG with overall weakness.  Performance deficits affecting function are weakness, impaired self care skills, impaired functional mobilty, impaired endurance, gait instability, decreased upper extremity function, pain, edema, impaired skin, impaired cardiopulmonary response to activity.      Rehab Prognosis:  Good; patient would benefit from acute skilled OT services to address these deficits and reach maximum level of function.       Plan:     Patient to be seen 5 x/week to address the above listed problems via self-care/home management, therapeutic activities, therapeutic exercises  · Plan of Care Expires: 02/21/20  · Plan of Care Reviewed with: patient    Subjective     Chief Complaint: "I need water."  Patient/Family stated goals: to get better and go home  Communicated with: RN prior to session.  Pain/Comfort:  · Pain Rating 1: (pt endorses L leg pain with wb'g, but does not rate)  · Pain Addressed 1: Reposition, Distraction, Cessation of Activity    Objective:     Communicated with: RN prior to session.  Patient found supine with: blood pressure cuff, pulse ox (continuous), telemetry, arterial line, central line, peripheral IV, snow catheter(Midline) upon OT entry to room.    General Precautions: Standard, fall, sternal   Orthopedic Precautions:N/A   Braces:       Occupational Performance:    Bed Mobility:    · Patient completed Rolling/Turning to Right with maximal assistance  · Patient completed Supine to Sit with maximal assistance    Functional Mobility/Transfers:  · Patient completed Sit <> Stand " Transfer with maximal assistance  with  no assistive device  from EOB; Max A  >> total A from b/s chair  · Functional Mobility: Max A x 3 steps to chair    Activities of Daily Living:  · Feeding:  minimum assistance drinking from cup  · Grooming: minimum assistance washing face  · Upper Body Dressing: total assistance    · Lower Body Dressing: total assistance    · Toileting: total assistance      Cognitive/Visual Perceptual:  Pt is alert and following commands    Physical Exam:  Postural examination/scapula alignment:    -       No postural abnormalities identified  Sensation:    -       Intact  Upper Extremity Range of Motion:     -       Right Upper Extremity: AAROM WFL  -       Left Upper Extremity: AAROM WFL  Upper Extremity Strength:    -       Right Upper Extremity: 3-/5 overall  -       Left Upper Extremity: 2/5 overall   Strength:    -       Right Upper Extremity: Fair  -       Left Upper Extremity: Fair  Edema throughout L UE with skin tears/blistering    AMPA 6 Click:  AMPAC Total Score: 8    Treatment & Education:  Education:  Pt ed on OT POC  Pt ed on sternal precautions during ADL; handout provided  Pt stood x 5 trials with max A x 4 trials and total A   Pt stood x 30 -40 seconds each trial with max A <> total A for static balance  Pt ed on ROM ex's 3x daily for increased overall strength and endurance    Patient left up in chair with all lines intact, call button in reach and RN notified    GOALS:   Multidisciplinary Problems     Occupational Therapy Goals        Problem: Occupational Therapy Goal    Goal Priority Disciplines Outcome Interventions   Occupational Therapy Goal     OT, PT/OT Ongoing, Progressing    Description:  Goals to be met by: 2/1/20     Patient will increase functional independence with ADLs by performing:    Feeding with [place].  UE Dressing with Minimal Assistance.  LE Dressing with Moderate Assistance.  Grooming while standing at sink with Contact Guard  Assistance.  Toileting from bedside commode with Moderate Assistance for hygiene and clothing management.   Toilet transfer to bedside commode with Minimal Assistance.                      History:     Past Medical History:   Diagnosis Date    Anxiety     Depression     Diabetes mellitus     type 2    GERD (gastroesophageal reflux disease)     Hyperlipemia     Hypertension     Myocardial infarction 2010    minor-caused by stress per pt.       Past Surgical History:   Procedure Laterality Date    Angiogram - Right Extremity Right 7/9/15    angiogram-left leg  10/6/15    CATHETERIZATION OF BOTH LEFT AND RIGHT HEART N/A 12/18/2019    Procedure: CATHETERIZATION, HEART, BOTH LEFT AND RIGHT;  Surgeon: Que Fernando III, MD;  Location: [facility] CATH LAB;  Service: Cardiology;  Laterality: N/A;    CORONARY ANGIOGRAPHY N/A 12/18/2019    Procedure: ANGIOGRAM, CORONARY ARTERY;  Surgeon: Que Fernando III, MD;  Location: [facility] CATH LAB;  Service: Cardiology;  Laterality: N/A;    CORONARY ARTERY BYPASS GRAFT (CABG) N/A 1/14/2020    Procedure: CORONARY ARTERY BYPASS GRAFT (CABG) x 1     Off Pump;  Surgeon: Huang Altamirano MD;  Location: [address];  Service: Cardiovascular;  Laterality: N/A;       Time Tracking:     OT Date of Treatment: 01/22/20  OT Start Time: 0846  OT Stop Time: 0911  OT Total Time (min): 25 min    Billable Minutes:Re-eval 10  Self Care/Home Management 15    ANNY Foster  1/22/2020

## 2020-01-22 NOTE — PLAN OF CARE
Problem: SLP Goal  Goal: SLP Goal  Description  Speech Language Pathology Goals  Goals expected to be met by 1/29:  1. Pt will tolerate least restrictive diet without s/s of aspiration.        Outcome: Ongoing, Progressing  Bedside swallow evaluation completed.  Pt appropriate to initiate a full liquid diet and thin liquids.  Not ready for solid diet at this time. Will continue to assess for readiness for diet advancement.   MARISOL Castellon, CCC-SLP  Speech Language Pathologist  [phone]  1/22/2020

## 2020-01-22 NOTE — PLAN OF CARE
Plan of Care Note  Cardiothoracic Surgery    Suyapa Connelly is a 54 y.o. female with CAD, DM, HF s/p CABG (1/14/20).    Specific issues: R IJ line d/c'ed, noted intermittent agitation, speech consult- diet advanced, qhs dialysis    Plan of care for patient was discussed with ICU staff including nurses, residents, and faculty and appropriate consulting services.    Will continue to monitor patient's hemodynamics, functionality, neuro status, fluid status and renal function, and labs and will adjust medications and fluids as necessary while monitoring appropriateness for de-escalation of support and monitoring and transport to stepdown unit.    Christian Dobson MD  Cardiothoracic Surgery Fellow

## 2020-01-22 NOTE — PLAN OF CARE
Problem: Occupational Therapy Goal  Goal: Occupational Therapy Goal  Description  Goals to be met by: 2/1/20     Patient will increase functional independence with ADLs by performing:    Feeding with [place].  UE Dressing with Minimal Assistance.  LE Dressing with Moderate Assistance.  Grooming while standing at sink with Contact Guard Assistance.  Toileting from bedside commode with Moderate Assistance for hygiene and clothing management.   Toilet transfer to bedside commode with Minimal Assistance.     Outcome: Ongoing, Progressing   OT re-eval completed, and above goals established. ANNY Foster  1/22/2020

## 2020-01-22 NOTE — PT/OT/SLP EVAL
Speech Language Pathology Evaluation  Bedside Swallow    Patient Name:  Suyapa Connelly   MRN:  [id number]  Admitting Diagnosis: Acute coronary syndrome    Recommendations:                 General Recommendations:  ongoing swallowing assessment/monitor diet tolance/assess for readiness for upgrade  Diet recommendations:   , Full liquids, Thin   Aspiration Precautions: 1 bite/sip at a time, Alternating bites/sips, Assistance with meals, Avoid talking while eating, Eliminate distractions, Feed only when awake/alert, Frequent oral care, HOB to 90 degrees, Meds crushed in puree, Monitor for s/s of aspiration, Remain upright 30 minutes post meal, Small bites/sips and Strict aspiration precautions   General Precautions: Standard, aspiration, fall, sternal  Communication strategies:  go to room if call light pushed    History:     Past Medical History:   Diagnosis Date    Anxiety     Depression     Diabetes mellitus     type 2    GERD (gastroesophageal reflux disease)     Hyperlipemia     Hypertension     Myocardial infarction 2010    minor-caused by stress per pt.       Past Surgical History:   Procedure Laterality Date    Angiogram - Right Extremity Right 7/9/15    angiogram-left leg  10/6/15    CATHETERIZATION OF BOTH LEFT AND RIGHT HEART N/A 12/18/2019    Procedure: CATHETERIZATION, HEART, BOTH LEFT AND RIGHT;  Surgeon: Que Fernando III, MD;  Location: [facility] CATH LAB;  Service: Cardiology;  Laterality: N/A;    CORONARY ANGIOGRAPHY N/A 12/18/2019    Procedure: ANGIOGRAM, CORONARY ARTERY;  Surgeon: Que Fernando III, MD;  Location: [facility] CATH LAB;  Service: Cardiology;  Laterality: N/A;    CORONARY ARTERY BYPASS GRAFT (CABG) N/A 1/14/2020    Procedure: CORONARY ARTERY BYPASS GRAFT (CABG) x 1     Off Pump;  Surgeon: Huang Altamirano MD;  Location: [address];  Service: Cardiovascular;  Laterality: N/A;     History of Present Illness:  Ms. Connelly is a 55 y/o AAF with known medical medical issues  "of chronic combined systolic and diastolic HF (EF 35% Grade II DD), CAD S/P MI 2010, Angiogram 12/18/19 mid LAD 90% stenosis, mid OM2 75% stenosis, and Ostial D1 70% stenosis LVDEP 25 mmHg, PAD S/P PTA with stents BLE 2015, IDDM (A1c 10.3%), HTN, HLD, GERD, Anxiety/ Depression. Patient present with a 2 day history of nausea and with multiple episodes of non-bilious non-bloody emesis and couple of episodes of watery non-bloody diarrhea. Patient reports initially feeling HARTMAN without associated chest pain/tightness for the past week before progressively worsening to the GI symptoms described. She reports having a 7 lb weight gain (dry weight unknown) and only able to walk less than 20-30 ft before feeling short of breath. She reports that 5-10 minute rest resolves her dyspnea and she never has shortness of breath at rest. She reports feeling similar symptoms to this when she was admitted to the hospital on 12/6/19 for ADHF. On Monday 12/30 she doubled her home torsemide dosage to help reduce the excess water weight but reports that it did not help much. When he GI symptoms developed on Tuesday, she reports being unable to tolerate anything P.O. and subsequently stopped taking all of her home medications including her home insulin. What prompted her to come into the hospital was a fluttering feeling in her chest associated with some diaphoresis. She reports not remembering the symptoms she felt when she was having her MI in 2010 but  at bedside reported that she had nausea and vomiting leading up to her MI and she collapsed and lost consciousness and then later once admitted to the hospital was told she had had a heart attack. Currently the patient reports feeling nauseated, diaphoretic, a mild headache, epigastric pain, with a vague "heart burn" like feeling in her chest. She denies light headedness, chest tightness, dyspnea, palpitations, dysuria, constipation, edema. She denies any recent sick contacts and " "fevers at home.     In the ED, , EKG showed sinus tachycardia without ST changes, 1st troponin 0.176, BNP 1,013, CXR - mild interstitial edema with pulmonary vascular congestion. She was seen by cardiology who suggested an ACS work up due to her recent angiogram showing vessel blockages and her being a diabetic with similar vague symptoms from last MI. They also suggested for CTS to be consulted to assess for CABG. Patient was admitted to hospital medicine Team 2 for further management and work up. Patient has now subsequently undergone one vessel CABG (LIMA to LAD) off pump. Procedure went according to plan, no blood products given. Was extubated in OR, given methadone and precedex for pain control. Came up on 0.04 levo. Upon assessment in ICU noted to be afib; started on amiodarone. Other VSS.     Prior Intubation HX:  1/16 - 1/21    Modified Barium Swallow: none on file    Chest X-Rays: 1/21: COMPARISON:  01/21/2020, 05:51 hours.    FINDINGS:  Mediastinal structures are midline.    Interval extubation and removal of NG tube.  Left thoracostomy tube and mediastinal drains has also been removed.  Central lines remain in their usual locations.  Sternal wires are intact and aligned.    Study was obtained with the patient in kyphotic position.  Opacification of the lower lung zones is compatible with dependent pleural fluid plus compressive atelectasis.  We consider aspiration, pneumonia and pulmonary edema less likely but not excluded.    Prior diet: currently NPO    Subjective     "Can I have a piece of ice?"    Pain/Comfort:  · Pain Rating 1: (pt did not complain)    Objective:     Oral Musculature Evaluation  · Oral Musculature: WFL, general weakness  · Dentition: present and adequate  · Secretion Management: adequate  · Mucosal Quality: dry  · Mandibular Strength and Mobility: WFL  · Oral Labial Strength and Mobility: WFL  · Lingual Strength and Mobility: WFL  · Buccal Strength and Mobility: " WFL  · Volitional Cough: adequate  · Volitional Swallow: decreased elevation and excursion for dry swallow; improved with PO  · Voice Prior to PO Intake: mild hoarseness; adequate intensity/volume    Bedside Swallow Eval:   Consistencies Assessed:  · Thin liquids ice x 1, 1/2 tsp x 1, full tsp x 1, cup sips x 2, straw sips x 4  · Puree 1/2 tsp x 1, full tsp x 2  · Solids 1/4 cracker x 1     Oral Phase:   · WFL for liquids and purees; poor mastication and bolus formation with solid; need for oral expulsion for portion of solid bolus; difficulty appeared to be related to dry mucosa in combination with decreased coordination of respiration and oral management of solid    Pharyngeal Phase:   · coughing during and after management of solid trial; no overt s/s of aspiration with thin liquids or purees    Compensatory Strategies  · liquid wash    Treatment: Education provided to pt regarding role of SLP, reason for swallow evaluation, dysphagia, aspiration, overt s.s of aspiration, complications associated with aspiration, diet recommendations, and plan for ongoing swallowing assessment to determine readiness for diet advancement.  Pt appearing anxious and distracted, therefore, full understanding of education may be limited.  White board updated.  Medical team and nurse notified of recommendations.     Assessment:     Suyapa Connelly is a 54 y.o. female with an SLP diagnosis of Dysphagia.     Goals:   Multidisciplinary Problems     SLP Goals        Problem: SLP Goal    Goal Priority Disciplines Outcome   SLP Goal     SLP Ongoing, Progressing   Description:  Speech Language Pathology Goals  Goals expected to be met by 1/29:  1. Pt will tolerate least restrictive diet without s/s of aspiration.                         Plan:     · Patient to be seen:  5 x/week   · Plan of Care expires:  02/21/20  · Plan of Care reviewed with:  patient   · SLP Follow-Up:  Yes       Discharge recommendations:  rehabilitation facility     Time  Tracking:     SLP Treatment Date:   01/22/20  Speech Start Time:  0830  Speech Stop Time:  0848     Speech Total Time (min):  18 min    Billable Minutes: Eval Swallow and Oral Function 10 and Seld Care/Home Management Training 8    MARISOL Castellon, CCC-SLP  01/22/2020     MARISOL Csatellon, CCC-SLP  Speech Language Pathologist  [phone]  1/22/2020

## 2020-01-22 NOTE — PLAN OF CARE
AVA Skin Integrity Evaluation      Subjective    AVA skin integrity champion evaluation of patient as part of the comprehensive skin care team .       Suyapa Connelly is being evaluated as per the Epic  pressure injury skin report.  She has been admitted to SICU for 20 days .   Skin injury was noted on 1/22        Limited Physical exam     Lesion 1: Thigh (posterior/medial)              Assessment :       Lesion 1:  Skin Tear with IAD, appears acute in nature (may be from bedpan?)     - due to location, and surrounding tissue, does not appear to be pressure related. More consistent with skin tear.     POA : no    Consults: Nutrition, Physical Therapy and Respiratory Therapy         Follow up:    Patient will be re-evaluated weekly.

## 2020-01-23 LAB
ALBUMIN SERPL BCP-MCNC: 2.5 G/DL (ref 3.5–5.2)
ALP SERPL-CCNC: 346 U/L (ref 55–135)
ALT SERPL W/O P-5'-P-CCNC: 220 U/L (ref 10–44)
ANION GAP SERPL CALC-SCNC: 11 MMOL/L (ref 8–16)
AST SERPL-CCNC: 78 U/L (ref 10–40)
BASOPHILS # BLD AUTO: 0.02 K/UL (ref 0–0.2)
BASOPHILS NFR BLD: 0.2 % (ref 0–1.9)
BILIRUB SERPL-MCNC: 1.7 MG/DL (ref 0.1–1)
BUN SERPL-MCNC: 21 MG/DL (ref 6–20)
CALCIUM SERPL-MCNC: 8.8 MG/DL (ref 8.7–10.5)
CHLORIDE SERPL-SCNC: 99 MMOL/L (ref 95–110)
CO2 SERPL-SCNC: 22 MMOL/L (ref 23–29)
CREAT SERPL-MCNC: 3.2 MG/DL (ref 0.5–1.4)
DIFFERENTIAL METHOD: ABNORMAL
EOSINOPHIL # BLD AUTO: 0.2 K/UL (ref 0–0.5)
EOSINOPHIL NFR BLD: 1.9 % (ref 0–8)
ERYTHROCYTE [DISTWIDTH] IN BLOOD BY AUTOMATED COUNT: 16 % (ref 11.5–14.5)
EST. GFR  (AFRICAN AMERICAN): 18.2 ML/MIN/1.73 M^2
EST. GFR  (NON AFRICAN AMERICAN): 15.8 ML/MIN/1.73 M^2
GLUCOSE SERPL-MCNC: 170 MG/DL (ref 70–110)
HCT VFR BLD AUTO: 28.6 % (ref 37–48.5)
HGB BLD-MCNC: 8.9 G/DL (ref 12–16)
IMM GRANULOCYTES # BLD AUTO: 0.06 K/UL (ref 0–0.04)
IMM GRANULOCYTES NFR BLD AUTO: 0.6 % (ref 0–0.5)
INR PPP: 1 (ref 0.8–1.2)
LYMPHOCYTES # BLD AUTO: 1.3 K/UL (ref 1–4.8)
LYMPHOCYTES NFR BLD: 12.5 % (ref 18–48)
MAGNESIUM SERPL-MCNC: 2.1 MG/DL (ref 1.6–2.6)
MCH RBC QN AUTO: 29.7 PG (ref 27–31)
MCHC RBC AUTO-ENTMCNC: 31.1 G/DL (ref 32–36)
MCV RBC AUTO: 95 FL (ref 82–98)
MONOCYTES # BLD AUTO: 2 K/UL (ref 0.3–1)
MONOCYTES NFR BLD: 19.3 % (ref 4–15)
NEUTROPHILS # BLD AUTO: 6.7 K/UL (ref 1.8–7.7)
NEUTROPHILS NFR BLD: 65.5 % (ref 38–73)
NRBC BLD-RTO: 0 /100 WBC
PHOSPHATE SERPL-MCNC: 4.8 MG/DL (ref 2.7–4.5)
PLATELET # BLD AUTO: 172 K/UL (ref 150–350)
PMV BLD AUTO: 10.8 FL (ref 9.2–12.9)
POCT GLUCOSE: 112 MG/DL (ref 70–110)
POCT GLUCOSE: 139 MG/DL (ref 70–110)
POTASSIUM SERPL-SCNC: 4.1 MMOL/L (ref 3.5–5.1)
PROT SERPL-MCNC: 7.3 G/DL (ref 6–8.4)
PROTHROMBIN TIME: 10.4 SEC (ref 9–12.5)
RBC # BLD AUTO: 3 M/UL (ref 4–5.4)
SODIUM SERPL-SCNC: 132 MMOL/L (ref 136–145)
WBC # BLD AUTO: 10.27 K/UL (ref 3.9–12.7)

## 2020-01-23 PROCEDURE — 92526 ORAL FUNCTION THERAPY: CPT

## 2020-01-23 PROCEDURE — 25000003 PHARM REV CODE 250: Performed by: SURGERY

## 2020-01-23 PROCEDURE — 20000000 HC ICU ROOM

## 2020-01-23 PROCEDURE — 63600175 PHARM REV CODE 636 W HCPCS: Performed by: SURGERY

## 2020-01-23 PROCEDURE — 80053 COMPREHEN METABOLIC PANEL: CPT

## 2020-01-23 PROCEDURE — 97535 SELF CARE MNGMENT TRAINING: CPT

## 2020-01-23 PROCEDURE — 63600175 PHARM REV CODE 636 W HCPCS: Performed by: NURSE PRACTITIONER

## 2020-01-23 PROCEDURE — 97803 MED NUTRITION INDIV SUBSEQ: CPT

## 2020-01-23 PROCEDURE — 99232 SBSQ HOSP IP/OBS MODERATE 35: CPT | Mod: ,,, | Performed by: NURSE PRACTITIONER

## 2020-01-23 PROCEDURE — 97110 THERAPEUTIC EXERCISES: CPT

## 2020-01-23 PROCEDURE — 25000003 PHARM REV CODE 250: Performed by: STUDENT IN AN ORGANIZED HEALTH CARE EDUCATION/TRAINING PROGRAM

## 2020-01-23 PROCEDURE — 84100 ASSAY OF PHOSPHORUS: CPT

## 2020-01-23 PROCEDURE — 83735 ASSAY OF MAGNESIUM: CPT

## 2020-01-23 PROCEDURE — 94761 N-INVAS EAR/PLS OXIMETRY MLT: CPT

## 2020-01-23 PROCEDURE — 99291 CRITICAL CARE FIRST HOUR: CPT | Mod: ,,, | Performed by: SURGERY

## 2020-01-23 PROCEDURE — 99232 PR SUBSEQUENT HOSPITAL CARE,LEVL II: ICD-10-PCS | Mod: ,,, | Performed by: NURSE PRACTITIONER

## 2020-01-23 PROCEDURE — 80100014 HC HEMODIALYSIS 1:1

## 2020-01-23 PROCEDURE — 85025 COMPLETE CBC W/AUTO DIFF WBC: CPT

## 2020-01-23 PROCEDURE — 99291 PR CRITICAL CARE, E/M 30-74 MINUTES: ICD-10-PCS | Mod: ,,, | Performed by: SURGERY

## 2020-01-23 PROCEDURE — 27000221 HC OXYGEN, UP TO 24 HOURS

## 2020-01-23 PROCEDURE — C9399 UNCLASSIFIED DRUGS OR BIOLOG: HCPCS | Performed by: NURSE PRACTITIONER

## 2020-01-23 PROCEDURE — 86850 RBC ANTIBODY SCREEN: CPT

## 2020-01-23 PROCEDURE — 63600175 PHARM REV CODE 636 W HCPCS: Performed by: STUDENT IN AN ORGANIZED HEALTH CARE EDUCATION/TRAINING PROGRAM

## 2020-01-23 PROCEDURE — 85610 PROTHROMBIN TIME: CPT

## 2020-01-23 PROCEDURE — 97530 THERAPEUTIC ACTIVITIES: CPT

## 2020-01-23 RX ORDER — SODIUM CHLORIDE 9 MG/ML
INJECTION, SOLUTION INTRAVENOUS ONCE
Status: DISCONTINUED | OUTPATIENT
Start: 2020-01-23 | End: 2020-02-01

## 2020-01-23 RX ORDER — PANTOPRAZOLE SODIUM 40 MG/1
40 TABLET, DELAYED RELEASE ORAL DAILY
Status: DISCONTINUED | OUTPATIENT
Start: 2020-01-23 | End: 2020-01-23

## 2020-01-23 RX ORDER — PANTOPRAZOLE SODIUM 40 MG/1
40 TABLET, DELAYED RELEASE ORAL 2 TIMES DAILY
Status: DISCONTINUED | OUTPATIENT
Start: 2020-01-23 | End: 2020-02-04 | Stop reason: HOSPADM

## 2020-01-23 RX ADMIN — ESCITALOPRAM 10 MG: 5 TABLET, FILM COATED ORAL at 08:01

## 2020-01-23 RX ADMIN — HYDROMORPHONE HYDROCHLORIDE 0.2 MG: 1 INJECTION, SOLUTION INTRAMUSCULAR; INTRAVENOUS; SUBCUTANEOUS at 01:01

## 2020-01-23 RX ADMIN — INSULIN DETEMIR 5 UNITS: 100 INJECTION, SOLUTION SUBCUTANEOUS at 08:01

## 2020-01-23 RX ADMIN — PANTOPRAZOLE SODIUM 40 MG: 40 TABLET, DELAYED RELEASE ORAL at 09:01

## 2020-01-23 RX ADMIN — PANTOPRAZOLE SODIUM 40 MG: 40 TABLET, DELAYED RELEASE ORAL at 08:01

## 2020-01-23 RX ADMIN — CLOPIDOGREL BISULFATE 75 MG: 75 TABLET, FILM COATED ORAL at 08:01

## 2020-01-23 RX ADMIN — HEPARIN SODIUM 5000 UNITS: 5000 INJECTION, SOLUTION INTRAVENOUS; SUBCUTANEOUS at 05:01

## 2020-01-23 RX ADMIN — OXYCODONE HYDROCHLORIDE 10 MG: 10 TABLET ORAL at 01:01

## 2020-01-23 RX ADMIN — HYDROMORPHONE HYDROCHLORIDE 0.2 MG: 1 INJECTION, SOLUTION INTRAMUSCULAR; INTRAVENOUS; SUBCUTANEOUS at 10:01

## 2020-01-23 RX ADMIN — OXYCODONE HYDROCHLORIDE 5 MG: 5 TABLET ORAL at 09:01

## 2020-01-23 RX ADMIN — HEPARIN SODIUM 5000 UNITS: 5000 INJECTION, SOLUTION INTRAVENOUS; SUBCUTANEOUS at 09:01

## 2020-01-23 RX ADMIN — ATORVASTATIN CALCIUM 80 MG: 20 TABLET, FILM COATED ORAL at 09:01

## 2020-01-23 RX ADMIN — DOBUTAMINE IN DEXTROSE 5 MCG/KG/MIN: 200 INJECTION, SOLUTION INTRAVENOUS at 04:01

## 2020-01-23 RX ADMIN — DEXMEDETOMIDINE HYDROCHLORIDE 0.2 MCG/KG/HR: 4 INJECTION, SOLUTION INTRAVENOUS at 08:01

## 2020-01-23 RX ADMIN — ASPIRIN 81 MG CHEWABLE TABLET 81 MG: 81 TABLET CHEWABLE at 08:01

## 2020-01-23 RX ADMIN — OXYCODONE HYDROCHLORIDE 10 MG: 10 TABLET ORAL at 07:01

## 2020-01-23 RX ADMIN — OXYCODONE HYDROCHLORIDE 5 MG: 5 TABLET ORAL at 12:01

## 2020-01-23 RX ADMIN — HEPARIN SODIUM 5000 UNITS: 5000 INJECTION, SOLUTION INTRAVENOUS; SUBCUTANEOUS at 02:01

## 2020-01-23 NOTE — PLAN OF CARE
Problem: Occupational Therapy Goal  Goal: Occupational Therapy Goal  Description  Goals to be met by: 2/1/20     Patient will increase functional independence with ADLs by performing:    Feeding with [place].  UE Dressing with Minimal Assistance.  LE Dressing with Moderate Assistance.  Grooming while standing at sink with Contact Guard Assistance.  Toileting from bedside commode with Moderate Assistance for hygiene and clothing management.   Toilet transfer to bedside commode with Minimal Assistance.     Outcome: Ongoing, Progressing   The above goals remain appropriate. ANNY Foster  1/23/2020

## 2020-01-23 NOTE — SUBJECTIVE & OBJECTIVE
Interval History/Significant Events: NAEON. Slept well intermittently. More oriented this am.     Follow-up For: Procedure(s) (LRB):  CORONARY ARTERY BYPASS GRAFT (CABG) x 1     Off Pump (N/A)    Post-Operative Day: 9 Days Post-Op    Objective:     Vital Signs (Most Recent):  Temp: 98.8 °F (37.1 °C) (01/23/20 0300)  Pulse: 71 (01/23/20 0700)  Resp: 20 (01/23/20 0700)  BP: (!) 157/70 (01/22/20 2000)  SpO2: 100 % (01/23/20 0700) Vital Signs (24h Range):  Temp:  [97.8 °F (36.6 °C)-98.8 °F (37.1 °C)] 98.8 °F (37.1 °C)  Pulse:  [71-83] 71  Resp:  [17-35] 20  SpO2:  [96 %-100 %] 100 %  BP: (157)/(70) 157/70  Arterial Line BP: (103-162)/(41-67) 116/53     Weight: 103.9 kg (229 lb 0.9 oz)  Body mass index is 39.32 kg/m².      Intake/Output Summary (Last 24 hours) at 1/23/2020 0753  Last data filed at 1/23/2020 0500  Gross per 24 hour   Intake 1299 ml   Output --   Net 1299 ml       Physical Exam   Constitutional: She appears well-developed and well-nourished. She is cooperative.   HENT:   Head: Normocephalic and atraumatic.   Eyes: Pupils are equal, round, and reactive to light.   Neck: Normal range of motion. Neck supple.   RIJ D/C'd. Dressing CDI  LIJ trialysis CDI, no erythema, no tenderness, no drainage   Cardiovascular: Normal rate and regular rhythm.   Abdominal: Soft. She exhibits no distension. There is no rebound.   Musculoskeletal: Normal range of motion. She exhibits edema. She exhibits no deformity.   Left arm sore with movement   Neurological: She is alert.   Skin: Skin is warm and dry. No rash noted. No erythema.     Lines/Drains/Airways     Central Venous Catheter Line                 Trialysis (Dialysis) Catheter left internal jugular -- days          Arterial Line                 Arterial Line 01/16/20 2300 Right Radial 6 days          Line                 Pacer Wires 01/14/20 1009 8 days          Peripheral Intravenous Line                 Peripheral IV - Single Lumen 01/21/20 0645 22 G Left Hand 2 days          Midline Catheter Insertion/Assessment  - Single Lumen 01/21/20 1016 Right basilic vein (medial side of arm) 18g x 10cm 1 day                Significant Labs:    CBC/Anemia Profile:  Recent Labs   Lab 01/21/20  1330 01/22/20  0109 01/23/20  0321   WBC 12.22 12.25 10.27   HGB 8.5* 7.4* 8.9*   HCT 27.7* 23.7* 28.6*    153 172   MCV 96 97 95   RDW 16.6* 16.8* 16.0*        Chemistries:  Recent Labs   Lab 01/21/20  1330 01/22/20  0109 01/22/20  1720 01/23/20  0321    140 135* 132*   K 4.3 4.3 4.2 4.1    104 102 99   CO2 24 22* 22* 22*   BUN 2* 6 16 21*   CREATININE 0.7 1.4 2.5* 3.2*   CALCIUM 8.7 8.5* 8.4* 8.8   ALBUMIN 2.4* 2.4*  --  2.5*   PROT  --  6.8  --  7.3   BILITOT  --  2.2*  --  1.7*   ALKPHOS  --  311*  --  346*   ALT  --  298*  --  220*   AST  --  119*  --  78*   MG 2.1 2.0 2.0 2.1   PHOS 2.4* 4.9* 4.4 4.8*       ABGs:   Recent Labs   Lab 01/22/20  1713   PH 7.417   PCO2 35.3   HCO3 22.7*   POCSATURATED 98   BE -2     Coagulation:   Recent Labs   Lab 01/23/20  0321   INR 1.0     Lactic Acid: No results for input(s): LACTATE in the last 48 hours.    Significant Imaging:  I have reviewed all pertinent imaging results/findings within the past 24 hours.

## 2020-01-23 NOTE — PROGRESS NOTES
[facility]  Nephrology  Progress Note    Patient Name: Suyapa Connelly  MRN: [id number]  Admission Date: 1/2/2020  Hospital Length of Stay: 21 days  Attending Provider: Huang Altamirano MD   Primary Care Physician: Erlinda Rahman NP  Principal Problem:Acute coronary syndrome    Subjective:     HPI: 55 y/o AAF with known medical medical issues of chronic combined systolic and diastolic HF (EF 35% Grade II DD), CAD S/P MI 2010, Angiogram 12/18/19 mid LAD 90% stenosis, mid OM2 75% stenosis, and Ostial D1 70% stenosis LVDEP 25 mmHg, PAD S/P PTA with stents BLE 2015, IDDM who presented to the ED with SOB and chest pain, found to have an NSTEMI which was treated medically with asa, heparin gtt. CTA of the lower extremities performed on 01/03 to investigate vasculature useful for CABG. Creatinine 3.6 up from a baseline of 1.2. She reports decreased urine output since the CTA but no increased swelling of the lower extremities. No SOB. She an episode of hypotension on 01/04 with a systolic of 80. She was on Lasix IV BID, last dose on 01/04 at 9:00 am. Nephrology consulted for oliguric ROSLYN.      Interval History: RRT held overnight. Patient more alert this AM, comfortable on NC. Patient net positive 1.2 L/24 hrs. No urine output over the last 24 hrs. sCr 3.2 from 1.4 on yesterday.     Review of patient's allergies indicates:   Allergen Reactions    Contrast media      Kidney injury    Pcn [penicillins]      Rash; tolerated ceftriaxone on 1/13/20     Current Facility-Administered Medications   Medication Frequency    0.9%  NaCl infusion Once    acetaminophen tablet 650 mg Q4H PRN    acetaminophen tablet 650 mg Q6H PRN    albuterol-ipratropium 2.5 mg-0.5 mg/3 mL nebulizer solution 3 mL Q4H PRN    aspirin chewable tablet 81 mg Daily    atorvastatin tablet 80 mg QHS    bisacodyl suppository 10 mg Q12H PRN    clopidogrel tablet 75 mg Daily    dexmedetomidine (PRECEDEX) 400mcg/100mL 0.9% NaCL  infusion Continuous    dextrose 10% (D10W) Bolus PRN    dextrose 10% (D10W) Bolus PRN    dextrose 5 % and 0.9 % NaCl with KCl 20 mEq infusion Continuous    DOBUTamine 500mg in D5W 250mL infusion (premix) (NON-TITRATING) Continuous    EPINEPHrine (ADRENALIN) 5 mg in sodium chloride 0.9% 250 mL infusion Continuous    escitalopram oxalate tablet 10 mg Daily    glucagon (human recombinant) injection 1 mg PRN    glucose chewable tablet 16 g PRN    glucose chewable tablet 24 g PRN    heparin (porcine) injection 5,000 Units Q8H    HYDROmorphone injection 0.2 mg Q1H PRN    insulin aspart U-100 pen 0-5 Units QID (AC + HS) PRN    insulin detemir U-100 pen 5 Units Daily    metoclopramide HCl injection 10 mg Q6H PRN    ondansetron disintegrating tablet 8 mg Q8H PRN    oxyCODONE immediate release tablet 10 mg Q4H PRN    oxyCODONE immediate release tablet 5 mg Q4H PRN    pantoprazole EC tablet 40 mg BID    polyethylene glycol packet 17 g Daily    scopolamine 1.3-1.5 mg (1 mg over 3 days) 1 patch Q3 Days       Objective:     Vital Signs (Most Recent):  Temp: 98.8 °F (37.1 °C) (01/23/20 0300)  Pulse: 72 (01/23/20 0828)  Resp: 18 (01/23/20 0828)  BP: (!) 157/70 (01/22/20 2000)  SpO2: 100 % (01/23/20 0828)  O2 Device (Oxygen Therapy): nasal cannula (01/23/20 0828) Vital Signs (24h Range):  Temp:  [97.8 °F (36.6 °C)-98.8 °F (37.1 °C)] 98.8 °F (37.1 °C)  Pulse:  [71-82] 72  Resp:  [17-30] 18  SpO2:  [98 %-100 %] 100 %  BP: (157)/(70) 157/70  Arterial Line BP: (103-162)/(41-67) 116/53     Weight: 103.9 kg (229 lb 0.9 oz) (01/23/20 0500)  Body mass index is 39.32 kg/m².  Body surface area is 2.17 meters squared.    I/O last 3 completed shifts:  In: 1934.1 [P.O.:610; I.V.:870.9; Blood:187; IV Piggyback:266.2]  Out: 10 [Urine:10]    Physical Exam   Constitutional: She is oriented to person, place, and time. She appears well-developed and well-nourished. She is cooperative. She appears ill. No distress. Nasal cannula in  place.   HENT:   Head: Normocephalic and atraumatic.   Right Ear: External ear normal.   Left Ear: External ear normal.   Mouth/Throat: Oropharynx is clear and moist. No oropharyngeal exudate.   Eyes: Right eye exhibits no discharge. Left eye exhibits no discharge. No scleral icterus.   Neck: Neck supple.   Cardiovascular: Normal rate.   No murmur heard.  Pulmonary/Chest: Effort normal. She has no wheezes. She has rales.   Abdominal: Soft. She exhibits no distension and no mass. There is no guarding. No hernia.   Musculoskeletal: Normal range of motion. She exhibits edema. She exhibits no tenderness or deformity.   Neurological: She is alert and oriented to person, place, and time.   Skin: Skin is warm and dry.       Significant Labs:  CBC:   Recent Labs   Lab 01/23/20  0321   WBC 10.27   RBC 3.00*   HGB 8.9*   HCT 28.6*      MCV 95   MCH 29.7   MCHC 31.1*     CMP:   Recent Labs   Lab 01/23/20  0321   *   CALCIUM 8.8   ALBUMIN 2.5*   PROT 7.3   *   K 4.1   CO2 22*   CL 99   BUN 21*   CREATININE 3.2*   ALKPHOS 346*   *   AST 78*   BILITOT 1.7*          Assessment/Plan:     * Acute coronary syndrome  - per primary team     ROSLYN (acute kidney injury)  At time of re-consult: Ms Connelly is a 53 y/o female who is s/p CABG. Nephrology re-consulted for ROSLYN, decreased UOP, and concern for volume overload. Patient is now anuric with sCr trending up (4.4 on 1/16/20). Patient with metabolic acidosis on ABG/serum CO2 of 19. Patient now with concern for volume overload and now intubated after progressively increased oxygen requirements. Concern for pulmonary edema on CXR.     Plan/recommendations:   -Will plan for a intermittent HD trial today for metabolic clearance and volume management, target UF 1-2 L as tolerated, keep MAP >65.  -Renal diet   -Strict I/O's   -Renally dose medications   -Avoid nephrotoxic medications  -Daily renal function panels   -Maintain Hgb >7.0  -Will discuss with Dr. Lange        Thank you for your consult. I will follow-up with patient. Please contact us if you have any additional questions.    Sarah Saba DNP, FNP-C  Nephrology  [facility]

## 2020-01-23 NOTE — PLAN OF CARE
Problem: SLP Goal  Goal: SLP Goal  Description  Speech Language Pathology Goals  Goals expected to be met by 1/29:  1. Pt will tolerate least restrictive diet without s/s of aspiration.        Outcome: Ongoing, Progressing  Pt safe to advance to a pureed diet, but not ready for soft solids at this time.  Difficulty with oral management and overt s/s of aspiration present with soft solid trials.  Ongoing swallowing assessment to be conducted.    MARISOL Castellon, CCC-SLP  Speech Language Pathologist  [phone]  1/23/2020

## 2020-01-23 NOTE — NURSING
SHIFT EVENTS: No acute events this shift     NEUROLOGICAL: Patient oriented x4, Precedex and PRN dilaudid given for pain/agitation, patient moves all extremities independently with strength, perrla, follows all commands     CARDIOVASCULAR: SR with occasional PVCs, IV hydralizine given x1 for SBP above goal <150, pulses palpable in all extremities     PULMONARY: Oxygen at 2L via NC with adequate saturations, multiple attempts made on room air before patient became symptomatic, lung sounds clear, IS follow-up provided     GENITOURINARY: Anuric, HD trial today     GASTROINTESTINAL: +BS, No BM this shift     NUTRITION/ENDOCRINE:  Full liquid diet, ACHS accuchecks without supplemental insulin required     SKIN/WOUNDS: MSI remains well approximated, new possible pressure related injury this shift     ACTIVITY: Q2h turning in bed     SOCIAL SUPPORT:  at bedside throughout shift, very supportive, understanding of plan for HD trial today

## 2020-01-23 NOTE — PROGRESS NOTES
Hd imitated, cathter ports  Aspirated and flushed , best BFR for HD is 200 to 220  After lines are repeatedly flushed. . Net goal set to uf 1 to liters as tolerated as  Ordered. B/P 118/56 pulse 74 , on nasal cannula  2  Liters , o2 sat 100%    . Patient alert .

## 2020-01-23 NOTE — PT/OT/SLP PROGRESS
Occupational Therapy   Treatment    Name: Suyapa Connelly  MRN: [id number]  Admitting Diagnosis:  Acute coronary syndrome  9 Days Post-Op    Recommendations:     Discharge Recommendations: rehabilitation facility  Discharge Equipment Recommendations:  (TBD)  Barriers to discharge:  None    Assessment:     Suyapa Connelly is a 54 y.o. female with a medical diagnosis of Acute coronary syndrome.  She presents s/p CABG with post-op complications including increased O2 requirements and need for paralytics. Pt very weak and requires max to total A for all ADL/transfers. Performance deficits affecting function are weakness, impaired self care skills, impaired balance, impaired functional mobilty, gait instability, impaired endurance, decreased upper extremity function, decreased safety awareness, edema, impaired skin, impaired cardiopulmonary response to activity.     Rehab Prognosis:  Good; patient would benefit from acute skilled OT services to address these deficits and reach maximum level of function.       Plan:     Patient to be seen 5 x/week to address the above listed problems via self-care/home management, therapeutic activities, therapeutic exercises  · Plan of Care Expires: 02/21/20  · Plan of Care Reviewed with: spouse, patient    Subjective     Pain/Comfort:  · Pain Rating 1: 10/10  · Location - Side 1: Bilateral  · Location - Orientation 1: generalized  · Location 1: neck  · Pain Addressed 1: Reposition, Distraction  · Pain Rating Post-Intervention 1: 10/10    Objective:     Communicated with: RN prior to session.  Patient found up in chair with blood pressure cuff, arterial line, telemetry, pulse ox (continuous), oxygen, central line upon OT entry to room.    General Precautions: Standard, fall, sternal   Orthopedic Precautions:N/A   Braces:       Occupational Performance:     Bed Mobility:    · Patient completed Sit to Supine with total assistance     Functional Mobility/Transfers:  · Patient completed Sit <>  Stand Transfer with maximal assistance  with  no assistive device from b/s chair x 3 trials  · Patient completed Step Transfer chair to bed with maximal assistance  · Functional Mobility: NT    Activities of Daily Living:  · Feeding:  moderate assistance with UE support to bring cup to mouth  · Grooming: maximal assistance washing face with  [place] A  · Lower Body Dressing: total assistance to don socks; unable to achieve Figure 4 position      AMPAC 6 Click ADL: 8    Treatment & Education:  Pt ed on OT POC  Pt re-ed on sternal precautions  Pt completed 10 reps of  BUE AA/PROM for shoulder flexion; elbow flexion/extension and hand squeezes  Pt stood from b/s chair x 3 trials with max A, but unable to achieve upright posture  Pt stood x ~30 - 45 seconds each trial  Pt/spouse ed on ROM Ex's and L UE positioning    Patient left supine for HD with all lines intact, call button in reach, spouse notified and spouse presentEducation:      GOALS:   Multidisciplinary Problems     Occupational Therapy Goals        Problem: Occupational Therapy Goal    Goal Priority Disciplines Outcome Interventions   Occupational Therapy Goal     OT, PT/OT Ongoing, Progressing    Description:  Goals to be met by: 2/1/20     Patient will increase functional independence with ADLs by performing:    Feeding with [place].  UE Dressing with Minimal Assistance.  LE Dressing with Moderate Assistance.  Grooming while standing at sink with Contact Guard Assistance.  Toileting from bedside commode with Moderate Assistance for hygiene and clothing management.   Toilet transfer to bedside commode with Minimal Assistance.                      Time Tracking:     OT Date of Treatment: 01/23/20  OT Start Time: 0951  OT Stop Time: 1022  OT Total Time (min): 31 min    Billable Minutes:Therapeutic Exercise 30    ANNY Foster  1/23/2020

## 2020-01-23 NOTE — PT/OT/SLP PROGRESS
"Speech Language Pathology Treatment    Patient Name:  Suyapa Connelly   MRN:  [id number]  Admitting Diagnosis: Acute coronary syndrome    Recommendations:                 General Recommendations:  Dysphagia therapy and ongoing swallowing assessment  Diet recommendations:  Puree, Liquid Diet Level: Thin   Aspiration Precautions: 1 bite/sip at a time, Alternating bites/sips, Assistance with meals, Avoid talking while eating, Eliminate distractions, Feed only when awake/alert, Frequent oral care, HOB to 90 degrees, Meds whole 1 at a time, Monitor for s/s of aspiration, Remain upright 30 minutes post meal, Small bites/sips and Strict aspiration precautions   General Precautions: Standard, aspiration, fall, sternal  Communication strategies:  provide increased time to answer and go to room if call light pushed    Subjective     "I'm eating it because y'all are asking me to."  Pt reports that she is not hungry, but trying to be participate in ongoing swallowing assessment.  PO intake appears to be limited at this time.     Pain/Comfort:  · Pain Rating 1: (did not c/o pain)    Objective:     Has the patient been evaluated by SLP for swallowing?   Yes  Keep patient NPO? No   Current Respiratory Status: nasal cannula(100%)      Pt found sitting up in bedside chair on nasal cannula. Pt awake, but appearing tired and weak.  , [place], present.  Pt's breakfast tray present and included Greek yogurt, cream of wheat, and juice.  Pt only consumed approx 3oz of juice from tray.  Pt endorsing decreased appetite.  Pt accepted the following PO trials with SLP observing: thin water via straw (approz 2oz consumed), applesauce via 1/2 tsp x 1 and full tsp x 1, and 1/2 grape x 1.  Oral and pharyngeal management of thin liquids and pureed trials were WFL/WNL.  Pt demonstrated prolonged mastication of grape, difficulty clearing bolus from oral cavity with portion of bolus noted on anterior hard palate behind from teeth, followed by " coughing and expulsion of oral residue (included piece of skin from grape).  Education was provided to pt and  regarding role of SLP, ongoing swallowing assessment, rationale for diet recommendations made after initial eval on 1/22, swallowing function, aspiration, overt s/s of aspiration, complications a/w aspiration, airway protection during the swallow, reduced endurance for managing solid food, recommendations to advance to pureed diet at this time, increasing nutrition, and plan for continued swallowing assessment to determine when appropriate for further diet advancement.  Understanding was expressed.  Will continue to reinforce.  White board updated. Nurse and primary team notified of recommendations.     Assessment:     Suyapa Connelly is a 54 y.o. female with an SLP diagnosis of Dysphagia.      Goals:   Multidisciplinary Problems     SLP Goals        Problem: SLP Goal    Goal Priority Disciplines Outcome   SLP Goal     SLP Ongoing, Progressing   Description:  Speech Language Pathology Goals  Goals expected to be met by 1/29:  1. Pt will tolerate least restrictive diet without s/s of aspiration.                         Plan:     · Patient to be seen:  5 x/week   · Plan of Care expires:  02/21/20  · Plan of Care reviewed with:  patient, spouse   · SLP Follow-Up:  Yes       Discharge recommendations:  rehabilitation facility     Time Tracking:     SLP Treatment Date:   01/23/20  Speech Start Time:  0854  Speech Stop Time:  0915     Speech Total Time (min):  21 min    Billable Minutes: Treatment Swallowing Dysfunction 11 and [place]eld Care/Home Management Training 10    MARISOL Castellon, CCC-SLP  01/23/2020     MARISOL Castellon, CCC-SLP  Speech Language Pathologist  [phone]  1/23/2020

## 2020-01-23 NOTE — PROGRESS NOTES
Patient sodium 135, down from 140.  MD on call, Johnathon, notified.  No new orders, will continue to monitor.

## 2020-01-23 NOTE — SUBJECTIVE & OBJECTIVE
Interval History: RRT held overnight. Patient more alert this AM, comfortable on NC. Patient net positive 1.2 L/24 hrs. No urine output over the last 24 hrs. sCr 3.2 from 1.4 on yesterday.     Review of patient's allergies indicates:   Allergen Reactions    Contrast media      Kidney injury    Pcn [penicillins]      Rash; tolerated ceftriaxone on 1/13/20     Current Facility-Administered Medications   Medication Frequency    0.9%  NaCl infusion Once    acetaminophen tablet 650 mg Q4H PRN    acetaminophen tablet 650 mg Q6H PRN    albuterol-ipratropium 2.5 mg-0.5 mg/3 mL nebulizer solution 3 mL Q4H PRN    aspirin chewable tablet 81 mg Daily    atorvastatin tablet 80 mg QHS    bisacodyl suppository 10 mg Q12H PRN    clopidogrel tablet 75 mg Daily    dexmedetomidine (PRECEDEX) 400mcg/100mL 0.9% NaCL infusion Continuous    dextrose 10% (D10W) Bolus PRN    dextrose 10% (D10W) Bolus PRN    dextrose 5 % and 0.9 % NaCl with KCl 20 mEq infusion Continuous    DOBUTamine 500mg in D5W 250mL infusion (premix) (NON-TITRATING) Continuous    EPINEPHrine (ADRENALIN) 5 mg in sodium chloride 0.9% 250 mL infusion Continuous    escitalopram oxalate tablet 10 mg Daily    glucagon (human recombinant) injection 1 mg PRN    glucose chewable tablet 16 g PRN    glucose chewable tablet 24 g PRN    heparin (porcine) injection 5,000 Units Q8H    HYDROmorphone injection 0.2 mg Q1H PRN    insulin aspart U-100 pen 0-5 Units QID (AC + HS) PRN    insulin detemir U-100 pen 5 Units Daily    metoclopramide HCl injection 10 mg Q6H PRN    ondansetron disintegrating tablet 8 mg Q8H PRN    oxyCODONE immediate release tablet 10 mg Q4H PRN    oxyCODONE immediate release tablet 5 mg Q4H PRN    pantoprazole EC tablet 40 mg BID    polyethylene glycol packet 17 g Daily    scopolamine 1.3-1.5 mg (1 mg over 3 days) 1 patch Q3 Days       Objective:     Vital Signs (Most Recent):  Temp: 98.8 °F (37.1 °C) (01/23/20 0300)  Pulse: 72  (01/23/20 0828)  Resp: 18 (01/23/20 0828)  BP: (!) 157/70 (01/22/20 2000)  SpO2: 100 % (01/23/20 0828)  O2 Device (Oxygen Therapy): nasal cannula (01/23/20 0828) Vital Signs (24h Range):  Temp:  [97.8 °F (36.6 °C)-98.8 °F (37.1 °C)] 98.8 °F (37.1 °C)  Pulse:  [71-82] 72  Resp:  [17-30] 18  SpO2:  [98 %-100 %] 100 %  BP: (157)/(70) 157/70  Arterial Line BP: (103-162)/(41-67) 116/53     Weight: 103.9 kg (229 lb 0.9 oz) (01/23/20 0500)  Body mass index is 39.32 kg/m².  Body surface area is 2.17 meters squared.    I/O last 3 completed shifts:  In: 1934.1 [P.O.:610; I.V.:870.9; Blood:187; IV Piggyback:266.2]  Out: 10 [Urine:10]    Physical Exam   Constitutional: She is oriented to person, place, and time. She appears well-developed and well-nourished. She is cooperative. She appears ill. No distress. Nasal cannula in place.   HENT:   Head: Normocephalic and atraumatic.   Right Ear: External ear normal.   Left Ear: External ear normal.   Mouth/Throat: Oropharynx is clear and moist. No oropharyngeal exudate.   Eyes: Right eye exhibits no discharge. Left eye exhibits no discharge. No scleral icterus.   Neck: Neck supple.   Cardiovascular: Normal rate.   No murmur heard.  Pulmonary/Chest: Effort normal. She has no wheezes. She has rales.   Abdominal: Soft. She exhibits no distension and no mass. There is no guarding. No hernia.   Musculoskeletal: Normal range of motion. She exhibits edema. She exhibits no tenderness or deformity.   Neurological: She is alert and oriented to person, place, and time.   Skin: Skin is warm and dry.       Significant Labs:  CBC:   Recent Labs   Lab 01/23/20  0321   WBC 10.27   RBC 3.00*   HGB 8.9*   HCT 28.6*      MCV 95   MCH 29.7   MCHC 31.1*     CMP:   Recent Labs   Lab 01/23/20  0321   *   CALCIUM 8.8   ALBUMIN 2.5*   PROT 7.3   *   K 4.1   CO2 22*   CL 99   BUN 21*   CREATININE 3.2*   ALKPHOS 346*   *   AST 78*   BILITOT 1.7*

## 2020-01-23 NOTE — PROGRESS NOTES
"[facility]  Adult Nutrition  Progress Note    SUMMARY       Recommendations  1. Encourage increased PO intake and OS intake as tolerated.   2. As medically able, ADAT to Cardiac with texture per SLP.   RD to monitor.    Goals: Patient to receive nutrition by RD follow-up  Nutrition Goal Status: goal met  Communication of RD Recs: discussed on rounds    Reason for Assessment  Reason For Assessment: RD follow-up  Diagnosis: surgery/postoperative complications(s/p CABG 1/14)  Relevant Medical History: CAD, CHF, DM2, HLD, HTN, GERD, PAD  Interdisciplinary Rounds: attended  General Information Comments: Started on FL diet yesterday per SLP. Fair PO intake so far. Impact AR ordered. Patient continues with no indicators of malnutrtition but at risk for acute malnutrition.  Nutrition Discharge Planning: Adequate nutrition via PO intake.    Nutrition Risk Screen  Nutrition Risk Screen: no indicators present    Nutrition/Diet History  Spiritual, Cultural Beliefs, Anglican Practices, Values that Affect Care: no  Factors Affecting Nutritional Intake: (FL diet)    Anthropometrics  Temp: 98.7 °F (37.1 °C)  Height Method: Stated  Height: 5' 4" (162.6 cm)  Height (inches): 64 in  Weight Method: Bed Scale  Weight: 103.9 kg (229 lb 0.9 oz)  Weight (lb): 229.06 lb  Ideal Body Weight (IBW), Female: 120 lb  % Ideal Body Weight, Female (lb): 185 %  BMI (Calculated): 39.3  BMI Grade: 35 - 39.9 - obesity - grade II    Lab/Procedures/Meds  Pertinent Labs Reviewed: reviewed  Pertinent Labs Comments: Na 132, BUN 21, Creat 3.2, Glu 170, POCT Glu 120-159, HgbA1c 9.7  Pertinent Medications Reviewed: reviewed  Pertinent Medications Comments: insulin, pantoprazole, precedex, dobutamine, epinephrine    Estimated/Assessed Needs  Weight Used For Calorie Calculations: 100.7 kg (222 lb 0.1 oz)  Energy Calorie Requirements (kcal): 1597 kcal/day  Energy Need Method: [place](no AF 2/2 obesity)  Protein Requirements:  " g/day(1.5-2.0 g/kg)  Weight Used For Protein Calculations: 54.5 kg (120 lb 2.4 oz)(IBW)  Fluid Requirements (mL): 1 mL/kcal or per MD  Estimated Fluid Requirement Method: RDA Method  RDA Method (mL): 1597    Nutrition Prescription Ordered  Current Diet Order: Full Liquid  Oral Nutrition Supplement: Impact AR    Evaluation of Received Nutrient/Fluid Intake  I/O: +13L x 24hrs, -3.2L since admit  Comments: LBM 1/22  % Intake of Estimated Energy Needs: 50 - 75 %  % Meal Intake: 50 - 75 %    Nutrition Risk  Level of Risk/Frequency of Follow-up: high(2x/week)     Assessment and Plan  Nutrition Problem  Increased nutrient needs     Related to (etiology):   Physiological causes related to healing     Signs and Symptoms (as evidenced by):   S/p CABG 1/14      Interventions (treatment strategy):  Collaboration of nutrition care with other providers     Nutrition Diagnosis Status:   Continues        Nutrition Problem  Inadequate energy intake     Related to (etiology):   Decreased ability to consume sufficient energy     Signs and Symptoms (as evidenced by):   NPO with no alternative means of nutrition at this time     Interventions (treatment strategy):  Collaboration of nutrition care with other providers     Nutrition Diagnosis Status:   Improving    Monitor and Evaluation  Food and Nutrient Intake: energy intake, food and beverage intake  Food and Nutrient Adminstration: diet order  Knowledge/Beliefs/Attitudes: food and nutrition knowledge/skill  Physical Activity and Function: nutrition-related ADLs and IADLs  Anthropometric Measurements: weight, weight change  Biochemical Data, Medical Tests and Procedures: electrolyte and renal panel, gastrointestinal profile, glucose/endocrine profile, inflammatory profile  Nutrition-Focused Physical Findings: overall appearance     Nutrition Follow-Up  RD Follow-up?: Yes

## 2020-01-23 NOTE — NURSING
SHIFT EVENTS: Sat up to side of bed    NEUROLOGICAL: Patient oriented to person and place, Precedex and PRN dilaudid given for pain/agitation, patient moves all extremities independently with strength, perrla, follows all commands     CARDIOVASCULAR: SR with occasional PVCs, Epi again titrated off, pulses palpable in all extremities     PULMONARY: Oxygen weaned from 6L to 2L via NC with adequate saturations, lung sounds clear, IS education provided     GENITOURINARY: Largely anuric via snow, holding CRRT tonight     GASTROINTESTINAL: Hypoactive BS, BM x1 this shift     NUTRITION/ENDOCRINE:  Ice chips as tolerated, Q4 accuchecks without supplemental insulin required, SLP ordered for today     SKIN/WOUNDS: MSI remains well approximated, new possible pressure injury to thigh noted and documented     ACTIVITY: Q2h turning in bed, up to side of bed     SOCIAL SUPPORT:  at bedside throughout shift, very supportive, understanding of plan for OT/PT/SLP consults today

## 2020-01-23 NOTE — ASSESSMENT & PLAN NOTE
Neuro:   - multimodal pain regimen  - restarted home lexapro  - precedex gtt for anxiety     Pulmonary:   - Extubated 1/21  - CXR prn  - ABG prn  - Pulmonary toilet  - IS     Cardiac:  - Epi off  - Dobutamine @ 2.5  - Echo 1/17:            Mildly decreased left ventricular systolic function. The estimated ejection fraction is 50%.  · Concentric left ventricular remodeling.  · Local segmental wall motion abnormalities.  · Septal wall has abnormal motion.  · Grade I (mild) left ventricular diastolic dysfunction consistent with impaired relaxation.  · Normal right ventricular systolic function.  · Severe tricuspid regurgitation.   -Pacer turned off    Renal:   - CRRT discontinued 1/21  - BUN/Cr increased, CTM  - HD trial today  - Kerns out     Infectious Disease:   - Afebrile, WBC 12 --> 10  - Antibiotics: Perioperative     Hematology/Oncology:  - monitor H/H, today 8.9/28.6     Endocrine:  - Endocrine consulted, keep blood glucose goals 140-180     Fluids/Electrolytes/Nutrition/GI:   - D5 NS 20 KCl @ 5  - Trend CMP  - LFTs trending down  - Replace Lytes PRN   - SLP following, cleared for FLD  - Scopolamine patch, PRN Zofran     Prophylaxis:  - GI: protonix daily     Dispo:  Continue ICU care, PT/OT  Primary team: JI

## 2020-01-23 NOTE — PLAN OF CARE
following for DC needs.  in communication with .    SW met with the patient and , Randell, at the bedside. SW provided the family with a list of Inpatient Rehab facilities and explained Inpatient Rehab to the family. The patient stated that she wants to go to [facility]. SW explained that [facility] is not usually in network with her insurance but SW will send the referral. Randell stated that they will review the list and call SW with additional Rehab choices.      01/23/20 1601   Post-Acute Status   Post-Acute Authorization Placement   Post-Acute Placement Status Referrals Sent     Carey Archuleta LCSW  [facility]  [id number]

## 2020-01-23 NOTE — PROGRESS NOTES
[facility]  Critical Care - Surgery  Progress Note    Patient Name: Suyapa Connelly  MRN: [id number]  Admission Date: 1/2/2020  Hospital Length of Stay: 21 days  Code Status: Full Code  Attending Provider: Huang Altamirano MD  Primary Care Provider: Erlinda Rahman NP   Principal Problem: Acute coronary syndrome    Subjective:     Hospital/ICU Course:  1/15: POD1. ON nausea without emesis. Zofran. 2L albumin, 5 amps bicarb. Clinically improving. 250 albumin running.   1/16: Increasing pressor requirements. Intubated for poor oxygenation, Left IJ trialysis placed for CRRT. Nitric added for RV support. Transfused pRBC x2 overnight.   1/19: Decreasing vent settings. Gases continue to look good. Decreasing pressor requirements. CRRT continues without issue. Paralysis discontinued.   1/20: Stayed on PS @ 8, FiO2 @ 40% most of the day. Minimal use of epi. CRRT continues.   1/21: Extubated. CRRT discontinued. CTs pulled.   1/22: Up in chair. Delirium improving with good sleep overnight. No UOP.     Interval History/Significant Events: NAEON. Slept well intermittently. More oriented this am.     Follow-up For: Procedure(s) (LRB):  CORONARY ARTERY BYPASS GRAFT (CABG) x 1     Off Pump (N/A)    Post-Operative Day: 9 Days Post-Op    Objective:     Vital Signs (Most Recent):  Temp: 98.8 °F (37.1 °C) (01/23/20 0300)  Pulse: 71 (01/23/20 0700)  Resp: 20 (01/23/20 0700)  BP: (!) 157/70 (01/22/20 2000)  SpO2: 100 % (01/23/20 0700) Vital Signs (24h Range):  Temp:  [97.8 °F (36.6 °C)-98.8 °F (37.1 °C)] 98.8 °F (37.1 °C)  Pulse:  [71-83] 71  Resp:  [17-35] 20  SpO2:  [96 %-100 %] 100 %  BP: (157)/(70) 157/70  Arterial Line BP: (103-162)/(41-67) 116/53     Weight: 103.9 kg (229 lb 0.9 oz)  Body mass index is 39.32 kg/m².      Intake/Output Summary (Last 24 hours) at 1/23/2020 0753  Last data filed at 1/23/2020 0500  Gross per 24 hour   Intake 1299 ml   Output --   Net 1299 ml       Physical Exam   Constitutional:  She appears well-developed and well-nourished. She is cooperative.   HENT:   Head: Normocephalic and atraumatic.   Eyes: Pupils are equal, round, and reactive to light.   Neck: Normal range of motion. Neck supple.   RIJ D/C'd. Dressing CDI  LIJ trialysis CDI, no erythema, no tenderness, no drainage   Cardiovascular: Normal rate and regular rhythm.   Abdominal: Soft. She exhibits no distension. There is no rebound.   Musculoskeletal: Normal range of motion. She exhibits edema. She exhibits no deformity.   Left arm sore with movement   Neurological: She is alert.   Skin: Skin is warm and dry. No rash noted. No erythema.     Lines/Drains/Airways     Central Venous Catheter Line                 Trialysis (Dialysis) Catheter left internal jugular -- days          Arterial Line                 Arterial Line 01/16/20 2300 Right Radial 6 days          Line                 Pacer Wires 01/14/20 1009 8 days          Peripheral Intravenous Line                 Peripheral IV - Single Lumen 01/21/20 0645 22 G Left Hand 2 days         Midline Catheter Insertion/Assessment  - Single Lumen 01/21/20 1016 Right basilic vein (medial side of arm) 18g x 10cm 1 day                Significant Labs:    CBC/Anemia Profile:  Recent Labs   Lab 01/21/20  1330 01/22/20  0109 01/23/20  0321   WBC 12.22 12.25 10.27   HGB 8.5* 7.4* 8.9*   HCT 27.7* 23.7* 28.6*    153 172   MCV 96 97 95   RDW 16.6* 16.8* 16.0*        Chemistries:  Recent Labs   Lab 01/21/20  1330 01/22/20  0109 01/22/20  1720 01/23/20  0321    140 135* 132*   K 4.3 4.3 4.2 4.1    104 102 99   CO2 24 22* 22* 22*   BUN 2* 6 16 21*   CREATININE 0.7 1.4 2.5* 3.2*   CALCIUM 8.7 8.5* 8.4* 8.8   ALBUMIN 2.4* 2.4*  --  2.5*   PROT  --  6.8  --  7.3   BILITOT  --  2.2*  --  1.7*   ALKPHOS  --  311*  --  346*   ALT  --  298*  --  220*   AST  --  119*  --  78*   MG 2.1 2.0 2.0 2.1   PHOS 2.4* 4.9* 4.4 4.8*       ABGs:   Recent Labs   Lab 01/22/20  1713   PH 7.417   PCO2 35.3    HCO3 22.7*   POCSATURATED 98   BE -2     Coagulation:   Recent Labs   Lab 01/23/20  0321   INR 1.0     Lactic Acid: No results for input(s): LACTATE in the last 48 hours.    Significant Imaging:  I have reviewed all pertinent imaging results/findings within the past 24 hours.    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 The estimated ejection fraction is 50%.  · Concentric left ventricular remodeling.  · Local segmental wall motion abnormalities.  · Septal wall has abnormal motion.  · Grade I (mild) left ventricular diastolic dysfunction consistent with impaired relaxation.  · Normal right ventricular systolic function.  · Severe tricuspid regurgitation.   -Pacer turned off    Renal:   - CRRT discontinued 1/21  - BUN/Cr increased, CTM  - HD trial today  - Kerns out     Infectious Disease:   - Afebrile, WBC 12 --> 10  - Antibiotics: Perioperative     Hematology/Oncology:  - monitor H/H, today 8.9/28.6     Endocrine:  - Endocrine consulted, keep blood glucose goals 140-180     Fluids/Electrolytes/Nutrition/GI:   - D5 NS 20 KCl @ 5  - Trend CMP  - LFTs trending down  - Replace Lytes PRN   - SLP following, cleared for FLD  - Scopolamine patch, PRN Zofran     Prophylaxis:  - GI: protonix daily     Dispo:  Continue ICU care, PT/OT  Primary team: CTS    Critical care was time spent personally by me on the following activities: development of treatment plan with patient or surrogate and bedside caregivers, discussions with consultants, evaluation of patient's response to treatment, examination of patient, ordering and performing treatments and interventions, ordering  and review of laboratory studies, ordering and review of radiographic studies, pulse oximetry, re-evaluation of patient's condition.  This critical care time did not overlap with that of any other provider or involve time for any procedures.     Negrita Cordova MD  Critical Care - Surgery  [facility]

## 2020-01-23 NOTE — ASSESSMENT & PLAN NOTE
At time of re-consult: Ms Connelly is a 55 y/o female who is s/p CABG. Nephrology re-consulted for ROSLYN, decreased UOP, and concern for volume overload. Patient is now anuric with sCr trending up (4.4 on 1/16/20). Patient with metabolic acidosis on ABG/serum CO2 of 19. Patient now with concern for volume overload and now intubated after progressively increased oxygen requirements. Concern for pulmonary edema on CXR.     Plan/recommendations:   -Will plan for a intermittent HD trial today for metabolic clearance and volume management, target UF 1-2 L as tolerated, keep MAP >65.  -Renal diet   -Strict I/O's   -Renally dose medications   -Avoid nephrotoxic medications  -Daily renal function panels   -Maintain Hgb >7.0  -Will discuss with Dr. Lange

## 2020-01-23 NOTE — PROGRESS NOTES
HD completed , net uf 1800 ml removed . Post B/Pm 125/60 pulse 70. O2 sat 100%. Blood returned and both ports flushed with normal saline and capped and secured. Patient alert and  Voice no complaints at this time.

## 2020-01-24 LAB
ABO + RH BLD: NORMAL
ALBUMIN SERPL BCP-MCNC: 2.4 G/DL (ref 3.5–5.2)
ALBUMIN SERPL BCP-MCNC: 2.4 G/DL (ref 3.5–5.2)
ALP SERPL-CCNC: 331 U/L (ref 55–135)
ALP SERPL-CCNC: 333 U/L (ref 55–135)
ALT SERPL W/O P-5'-P-CCNC: 168 U/L (ref 10–44)
ALT SERPL W/O P-5'-P-CCNC: 171 U/L (ref 10–44)
ANION GAP SERPL CALC-SCNC: 11 MMOL/L (ref 8–16)
ANION GAP SERPL CALC-SCNC: 9 MMOL/L (ref 8–16)
ANISOCYTOSIS BLD QL SMEAR: SLIGHT
AST SERPL-CCNC: 54 U/L (ref 10–40)
AST SERPL-CCNC: 55 U/L (ref 10–40)
BASO STIPL BLD QL SMEAR: ABNORMAL
BASOPHILS # BLD AUTO: 0.03 K/UL (ref 0–0.2)
BASOPHILS NFR BLD: 0.3 % (ref 0–1.9)
BILIRUB SERPL-MCNC: 1.3 MG/DL (ref 0.1–1)
BILIRUB SERPL-MCNC: 1.3 MG/DL (ref 0.1–1)
BLD GP AB SCN CELLS X3 SERPL QL: NORMAL
BUN SERPL-MCNC: 19 MG/DL (ref 6–20)
BUN SERPL-MCNC: 21 MG/DL (ref 6–20)
CALCIUM SERPL-MCNC: 9 MG/DL (ref 8.7–10.5)
CALCIUM SERPL-MCNC: 9.1 MG/DL (ref 8.7–10.5)
CHLORIDE SERPL-SCNC: 100 MMOL/L (ref 95–110)
CHLORIDE SERPL-SCNC: 100 MMOL/L (ref 95–110)
CO2 SERPL-SCNC: 18 MMOL/L (ref 23–29)
CO2 SERPL-SCNC: 19 MMOL/L (ref 23–29)
CREAT SERPL-MCNC: 3.3 MG/DL (ref 0.5–1.4)
CREAT SERPL-MCNC: 3.3 MG/DL (ref 0.5–1.4)
DIFFERENTIAL METHOD: ABNORMAL
EOSINOPHIL # BLD AUTO: 0.2 K/UL (ref 0–0.5)
EOSINOPHIL NFR BLD: 1.3 % (ref 0–8)
ERYTHROCYTE [DISTWIDTH] IN BLOOD BY AUTOMATED COUNT: 15.8 % (ref 11.5–14.5)
EST. GFR  (AFRICAN AMERICAN): 17.5 ML/MIN/1.73 M^2
EST. GFR  (AFRICAN AMERICAN): 17.5 ML/MIN/1.73 M^2
EST. GFR  (NON AFRICAN AMERICAN): 15.2 ML/MIN/1.73 M^2
EST. GFR  (NON AFRICAN AMERICAN): 15.2 ML/MIN/1.73 M^2
GLUCOSE SERPL-MCNC: 141 MG/DL (ref 70–110)
GLUCOSE SERPL-MCNC: 142 MG/DL (ref 70–110)
HCT VFR BLD AUTO: 29.4 % (ref 37–48.5)
HGB BLD-MCNC: 9.1 G/DL (ref 12–16)
IMM GRANULOCYTES # BLD AUTO: 0.07 K/UL (ref 0–0.04)
IMM GRANULOCYTES NFR BLD AUTO: 0.6 % (ref 0–0.5)
INR PPP: 1 (ref 0.8–1.2)
LYMPHOCYTES # BLD AUTO: 1.5 K/UL (ref 1–4.8)
LYMPHOCYTES NFR BLD: 13 % (ref 18–48)
MAGNESIUM SERPL-MCNC: 2.1 MG/DL (ref 1.6–2.6)
MCH RBC QN AUTO: 29.7 PG (ref 27–31)
MCHC RBC AUTO-ENTMCNC: 31 G/DL (ref 32–36)
MCV RBC AUTO: 96 FL (ref 82–98)
MONOCYTES # BLD AUTO: 2.1 K/UL (ref 0.3–1)
MONOCYTES NFR BLD: 18.2 % (ref 4–15)
NEUTROPHILS # BLD AUTO: 7.6 K/UL (ref 1.8–7.7)
NEUTROPHILS NFR BLD: 66.6 % (ref 38–73)
NRBC BLD-RTO: 0 /100 WBC
PHOSPHATE SERPL-MCNC: 4 MG/DL (ref 2.7–4.5)
PLATELET # BLD AUTO: 197 K/UL (ref 150–350)
PLATELET BLD QL SMEAR: ABNORMAL
PMV BLD AUTO: 11.5 FL (ref 9.2–12.9)
POCT GLUCOSE: 148 MG/DL (ref 70–110)
POCT GLUCOSE: 171 MG/DL (ref 70–110)
POCT GLUCOSE: 177 MG/DL (ref 70–110)
POCT GLUCOSE: 185 MG/DL (ref 70–110)
POLYCHROMASIA BLD QL SMEAR: ABNORMAL
POTASSIUM SERPL-SCNC: 4.4 MMOL/L (ref 3.5–5.1)
POTASSIUM SERPL-SCNC: 4.6 MMOL/L (ref 3.5–5.1)
PROT SERPL-MCNC: 7.5 G/DL (ref 6–8.4)
PROT SERPL-MCNC: 7.6 G/DL (ref 6–8.4)
PROTHROMBIN TIME: 10.6 SEC (ref 9–12.5)
RBC # BLD AUTO: 3.06 M/UL (ref 4–5.4)
SODIUM SERPL-SCNC: 128 MMOL/L (ref 136–145)
SODIUM SERPL-SCNC: 129 MMOL/L (ref 136–145)
WBC # BLD AUTO: 11.4 K/UL (ref 3.9–12.7)

## 2020-01-24 PROCEDURE — 63600175 PHARM REV CODE 636 W HCPCS: Performed by: STUDENT IN AN ORGANIZED HEALTH CARE EDUCATION/TRAINING PROGRAM

## 2020-01-24 PROCEDURE — 80053 COMPREHEN METABOLIC PANEL: CPT

## 2020-01-24 PROCEDURE — 97110 THERAPEUTIC EXERCISES: CPT

## 2020-01-24 PROCEDURE — 99233 SBSQ HOSP IP/OBS HIGH 50: CPT | Mod: ,,, | Performed by: NURSE PRACTITIONER

## 2020-01-24 PROCEDURE — 25000003 PHARM REV CODE 250: Performed by: STUDENT IN AN ORGANIZED HEALTH CARE EDUCATION/TRAINING PROGRAM

## 2020-01-24 PROCEDURE — 85025 COMPLETE CBC W/AUTO DIFF WBC: CPT

## 2020-01-24 PROCEDURE — 99291 CRITICAL CARE FIRST HOUR: CPT | Mod: ,,, | Performed by: SURGERY

## 2020-01-24 PROCEDURE — 97116 GAIT TRAINING THERAPY: CPT

## 2020-01-24 PROCEDURE — 25000003 PHARM REV CODE 250: Performed by: SURGERY

## 2020-01-24 PROCEDURE — 99233 PR SUBSEQUENT HOSPITAL CARE,LEVL III: ICD-10-PCS | Mod: ,,, | Performed by: NURSE PRACTITIONER

## 2020-01-24 PROCEDURE — 94664 DEMO&/EVAL PT USE INHALER: CPT

## 2020-01-24 PROCEDURE — 97535 SELF CARE MNGMENT TRAINING: CPT

## 2020-01-24 PROCEDURE — 27000221 HC OXYGEN, UP TO 24 HOURS

## 2020-01-24 PROCEDURE — 83735 ASSAY OF MAGNESIUM: CPT

## 2020-01-24 PROCEDURE — 93005 ELECTROCARDIOGRAM TRACING: CPT

## 2020-01-24 PROCEDURE — 93010 ELECTROCARDIOGRAM REPORT: CPT | Mod: ,,, | Performed by: INTERNAL MEDICINE

## 2020-01-24 PROCEDURE — 63600175 PHARM REV CODE 636 W HCPCS: Performed by: SURGERY

## 2020-01-24 PROCEDURE — 94761 N-INVAS EAR/PLS OXIMETRY MLT: CPT

## 2020-01-24 PROCEDURE — 84100 ASSAY OF PHOSPHORUS: CPT

## 2020-01-24 PROCEDURE — 93010 EKG 12-LEAD: ICD-10-PCS | Mod: ,,, | Performed by: INTERNAL MEDICINE

## 2020-01-24 PROCEDURE — 99900035 HC TECH TIME PER 15 MIN (STAT)

## 2020-01-24 PROCEDURE — 20000000 HC ICU ROOM

## 2020-01-24 PROCEDURE — 80053 COMPREHEN METABOLIC PANEL: CPT | Mod: 91

## 2020-01-24 PROCEDURE — 25000003 PHARM REV CODE 250: Performed by: THORACIC SURGERY (CARDIOTHORACIC VASCULAR SURGERY)

## 2020-01-24 PROCEDURE — 92526 ORAL FUNCTION THERAPY: CPT

## 2020-01-24 PROCEDURE — 27000646 HC AEROBIKA DEVICE

## 2020-01-24 PROCEDURE — 85610 PROTHROMBIN TIME: CPT

## 2020-01-24 PROCEDURE — 99291 PR CRITICAL CARE, E/M 30-74 MINUTES: ICD-10-PCS | Mod: ,,, | Performed by: SURGERY

## 2020-01-24 RX ORDER — ONDANSETRON 2 MG/ML
4 INJECTION INTRAMUSCULAR; INTRAVENOUS EVERY 6 HOURS PRN
Status: DISCONTINUED | OUTPATIENT
Start: 2020-01-24 | End: 2020-02-04

## 2020-01-24 RX ORDER — METOPROLOL SUCCINATE 50 MG/1
50 TABLET, EXTENDED RELEASE ORAL DAILY
Status: DISCONTINUED | OUTPATIENT
Start: 2020-01-24 | End: 2020-02-04 | Stop reason: HOSPADM

## 2020-01-24 RX ORDER — METOPROLOL TARTRATE 1 MG/ML
5 INJECTION, SOLUTION INTRAVENOUS EVERY 5 MIN PRN
Status: DISCONTINUED | OUTPATIENT
Start: 2020-01-24 | End: 2020-02-04

## 2020-01-24 RX ORDER — AMIODARONE HYDROCHLORIDE 200 MG/1
200 TABLET ORAL DAILY
Status: DISCONTINUED | OUTPATIENT
Start: 2020-01-25 | End: 2020-01-27

## 2020-01-24 RX ADMIN — OXYCODONE HYDROCHLORIDE 10 MG: 10 TABLET ORAL at 09:01

## 2020-01-24 RX ADMIN — OXYCODONE HYDROCHLORIDE 5 MG: 5 TABLET ORAL at 08:01

## 2020-01-24 RX ADMIN — ATORVASTATIN CALCIUM 80 MG: 20 TABLET, FILM COATED ORAL at 09:01

## 2020-01-24 RX ADMIN — SCOPALAMINE 1 PATCH: 1 PATCH, EXTENDED RELEASE TRANSDERMAL at 11:01

## 2020-01-24 RX ADMIN — METOPROLOL SUCCINATE 50 MG: 50 TABLET, EXTENDED RELEASE ORAL at 08:01

## 2020-01-24 RX ADMIN — CLOPIDOGREL BISULFATE 75 MG: 75 TABLET, FILM COATED ORAL at 08:01

## 2020-01-24 RX ADMIN — AMIODARONE HYDROCHLORIDE 1 MG/MIN: 1.8 INJECTION, SOLUTION INTRAVENOUS at 06:01

## 2020-01-24 RX ADMIN — PANTOPRAZOLE SODIUM 40 MG: 40 TABLET, DELAYED RELEASE ORAL at 08:01

## 2020-01-24 RX ADMIN — PANTOPRAZOLE SODIUM 40 MG: 40 TABLET, DELAYED RELEASE ORAL at 09:01

## 2020-01-24 RX ADMIN — METOPROLOL TARTRATE 2.5 MG: 5 INJECTION INTRAVENOUS at 05:01

## 2020-01-24 RX ADMIN — ONDANSETRON 8 MG: 8 TABLET, ORALLY DISINTEGRATING ORAL at 06:01

## 2020-01-24 RX ADMIN — HEPARIN SODIUM 5000 UNITS: 5000 INJECTION, SOLUTION INTRAVENOUS; SUBCUTANEOUS at 09:01

## 2020-01-24 RX ADMIN — ESCITALOPRAM 10 MG: 5 TABLET, FILM COATED ORAL at 08:01

## 2020-01-24 RX ADMIN — HEPARIN SODIUM 5000 UNITS: 5000 INJECTION, SOLUTION INTRAVENOUS; SUBCUTANEOUS at 03:01

## 2020-01-24 RX ADMIN — ONDANSETRON 8 MG: 8 TABLET, ORALLY DISINTEGRATING ORAL at 12:01

## 2020-01-24 RX ADMIN — ASPIRIN 81 MG CHEWABLE TABLET 81 MG: 81 TABLET CHEWABLE at 08:01

## 2020-01-24 RX ADMIN — AMIODARONE HYDROCHLORIDE 150 MG: 1.5 INJECTION, SOLUTION INTRAVENOUS at 05:01

## 2020-01-24 NOTE — PLAN OF CARE
Plan of Care Note  Cardiothoracic Surgery    Suyapa Connelly is a 54 y.o. female with CAD, DM, HF s/p CABG (1/14/20).    Specific issues: dialysis for 3-4 hrs; CPT; mobilization    Plan of care for patient was discussed with ICU staff including nurses, residents, and faculty and appropriate consulting services.    Will continue to monitor patient's hemodynamics, functionality, neuro status, fluid status and renal function, and labs and will adjust medications and fluids as necessary while monitoring appropriateness for de-escalation of support and monitoring and transport to stepdown unit.    Christian Dobson MD  Cardiothoracic Surgery Fellow

## 2020-01-24 NOTE — PROGRESS NOTES
Pt once again became tachycardic with HR in 170s. MD notified and at bedside, 12 lead EKG obtained, orders for amiodarone bolus and gtt and metoprolol push. HR NSR 68 sustained. Will continue to monitor.

## 2020-01-24 NOTE — ASSESSMENT & PLAN NOTE
Titrate insulin slowly to avoid hypoglycemia as the risk of hypoglycemia increases with decreased creatinine clearance.

## 2020-01-24 NOTE — CARE UPDATE
BG goal 140 - 180     BG is within goal on current PRN Novolog correction scale. Appetite remains minimal, and kidney function remains poor, which is most likely cause of decreased insulin needs.     Low Dose SQ Insulin Correction Scale.  BG Monitoring AC/HS     ** Please call Endocrine for any BG related issues **  ** Please notify Endocrine for any change and/or advance in diet**    Discharge Planning:    TBD. Please notify endocrinology prior to discharge.

## 2020-01-24 NOTE — SUBJECTIVE & OBJECTIVE
"Interval HPI:   Overnight events:  BG is reasonably controlled on current SQ insulin regimen.     Eating:   <25%  Nausea: No  Hypoglycemia and intervention: No  Fever: No  TPN and/or TF: No  If yes, type of TF/TPN and rate: None    BP (!) 114/59 (BP Location: Left arm, Patient Position: Lying)   Pulse 77   Temp 98.6 °F (37 °C) (Oral)   Resp 19   Ht 5' 4" (1.626 m)   Wt 103.9 kg (229 lb 0.9 oz)   LMP 09/30/2015 (Exact Date)   SpO2 97%   Breastfeeding? No   BMI 39.32 kg/m²     Labs Reviewed and Include    Recent Labs   Lab 01/23/20  0321   *   CALCIUM 8.8   ALBUMIN 2.5*   PROT 7.3   *   K 4.1   CO2 22*   CL 99   BUN 21*   CREATININE 3.2*   ALKPHOS 346*   *   AST 78*   BILITOT 1.7*     Lab Results   Component Value Date    WBC 10.27 01/23/2020    HGB 8.9 (L) 01/23/2020    HCT 28.6 (L) 01/23/2020    MCV 95 01/23/2020     01/23/2020     No results for input(s): TSH, FREET4 in the last 168 hours.  Lab Results   Component Value Date    HGBA1C 9.7 (H) 01/03/2020       Nutritional status:   Body mass index is 39.32 kg/m².  Lab Results   Component Value Date    ALBUMIN 2.5 (L) 01/23/2020    ALBUMIN 2.4 (L) 01/22/2020    ALBUMIN 2.4 (L) 01/21/2020     No results found for: PREALBUMIN    Estimated Creatinine Clearance: 23.9 mL/min (A) (based on SCr of 3.2 mg/dL (H)).    Accu-Checks  Recent Labs     01/21/20  1339 01/21/20  1822 01/21/20  1929 01/22/20  0117 01/22/20  0437 01/22/20  0954 01/22/20  1718 01/22/20  2123 01/23/20  0828 01/23/20  1657   POCTGLUCOSE 95 78 103 114* 95 120* 133* 159* 139* 112*       Current Medications and/or Treatments Impacting Glycemic Control  Immunotherapy:    Immunosuppressants     None        Steroids:   Hormones (From admission, onward)    None        Pressors:    Autonomic Drugs (From admission, onward)    Start     Stop Route Frequency Ordered    01/16/20 1430  succinylcholine (ANECTINE) 20 mg/mL injection     Note to Pharmacy:  Created by cabinet override "    01/17 0244   01/16/20 1430    01/14/20 1853  rocuronium 10 mg/mL injection     Note to Pharmacy:  Created by cabinet override    01/15 0659   01/14/20 1853    01/14/20 1230  EPINEPHrine (ADRENALIN) 5 mg in sodium chloride 0.9% 250 mL infusion     Question Answer Comment   Titrate by: (in mcg/kg/min) 0.01    Titrate interval: (in minutes) 5    Titrate to maintain: (SBP or MAP or Cardiac Index) SBP    Maximum dose: (in mcg/kg/min) 2        -- IV Continuous 01/14/20 1129        Hyperglycemia/Diabetes Medications:   Antihyperglycemics (From admission, onward)    Start     Stop Route Frequency Ordered    01/23/20 0900  insulin detemir U-100 pen 5 Units      -- SubQ Daily 01/22/20 1431    01/22/20 1531  insulin aspart U-100 pen 0-5 Units      -- SubQ Before meals & nightly PRN 01/22/20 1431

## 2020-01-24 NOTE — ASSESSMENT & PLAN NOTE
At time of re-consult: Ms Connelly is a 55 y/o female who is s/p CABG. Nephrology re-consulted for ROSLYN, decreased UOP, and concern for volume overload. Patient is now anuric with sCr trending up (4.4 on 1/16/20). Patient with metabolic acidosis on ABG/serum CO2 of 19. Patient now with concern for volume overload and now intubated after progressively increased oxygen requirements. Concern for pulmonary edema on CXR.     Plan/recommendations:   -no urgent need for RRT today, plan for next session tomorrow  -will order cathflo to dialysis catheter to improve BFR  -Renal diet   -Strict I/O's   -Renally dose medications   -Avoid nephrotoxic medications  -Daily renal function panels   -Maintain Hgb >7.0  -Will discuss with Dr. Lange

## 2020-01-24 NOTE — PROGRESS NOTES
"[facility]  Endocrinology  Progress Note    Admit Date: 1/2/2020     Reason for Consult: Management of T2DM, Hyperglycemia     Surgical Procedure and Date: n/a    Diabetes diagnosis year:  2006    Home Diabetes Medications:  Lantus 50 units daily and novolog 9 units with meals   Lab Results   Component Value Date    HGBA1C 9.7 (H) 01/03/2020         How often checking glucose at home? 1-3 x day   BG readings on regimen: 100-200 typically; sometimes in 300s   Hypoglycemia on the regimen?  Yes occasionally - usually overnight   Missed doses on regimen?  Yes    Diabetes Complications include:     Hyperglycemia, Hypoglycemia  and Diabetic peripheral neuropathy     Complicating diabetes co morbidities:   History of MI and CHF      HPI:   Patient is a 54 y.o. female with a diagnosis of uncontrolled type 2 DM. Also with CHF, PAD, HTN, HLD, and CAD. Patient presented with chest pain and diaphoresis. Also with nauseated, diaphoretic, a mild headache, epigastric pain, with a vague "heart burn" like feeling in her chest. Patient admitted for treatment and further workup. Endocrinology consulted for BG/ DM management.             Interval HPI:   Overnight events:  BG is reasonably controlled on current SQ insulin regimen.     Eating:   <25%  Nausea: No  Hypoglycemia and intervention: No  Fever: No  TPN and/or TF: No  If yes, type of TF/TPN and rate: None    BP (!) 114/59 (BP Location: Left arm, Patient Position: Lying)   Pulse 77   Temp 98.6 °F (37 °C) (Oral)   Resp 19   Ht 5' 4" (1.626 m)   Wt 103.9 kg (229 lb 0.9 oz)   LMP 09/30/2015 (Exact Date)   SpO2 97%   Breastfeeding? No   BMI 39.32 kg/m²      Labs Reviewed and Include    Recent Labs   Lab 01/23/20  0321   *   CALCIUM 8.8   ALBUMIN 2.5*   PROT 7.3   *   K 4.1   CO2 22*   CL 99   BUN 21*   CREATININE 3.2*   ALKPHOS 346*   *   AST 78*   BILITOT 1.7*     Lab Results   Component Value Date    WBC 10.27 01/23/2020    HGB 8.9 " (L) 01/23/2020    HCT 28.6 (L) 01/23/2020    MCV 95 01/23/2020     01/23/2020     No results for input(s): TSH, FREET4 in the last 168 hours.  Lab Results   Component Value Date    HGBA1C 9.7 (H) 01/03/2020       Nutritional status:   Body mass index is 39.32 kg/m².  Lab Results   Component Value Date    ALBUMIN 2.5 (L) 01/23/2020    ALBUMIN 2.4 (L) 01/22/2020    ALBUMIN 2.4 (L) 01/21/2020     No results found for: PREALBUMIN    Estimated Creatinine Clearance: 23.9 mL/min (A) (based on SCr of 3.2 mg/dL (H)).    Accu-Checks  Recent Labs     01/21/20  1339 01/21/20  1822 01/21/20  1929 01/22/20  0117 01/22/20  0437 01/22/20  0954 01/22/20  1718 01/22/20  2123 01/23/20  0828 01/23/20  1657   POCTGLUCOSE 95 78 103 114* 95 120* 133* 159* 139* 112*       Current Medications and/or Treatments Impacting Glycemic Control  Immunotherapy:    Immunosuppressants     None        Steroids:   Hormones (From admission, onward)    None        Pressors:    Autonomic Drugs (From admission, onward)    Start     Stop Route Frequency Ordered    01/16/20 1430  succinylcholine (ANECTINE) 20 mg/mL injection     Note to Pharmacy:  Created by Philtroinet override    01/17 0244   01/16/20 1430    01/14/20 1853  rocuronium 10 mg/mL injection     Note to Pharmacy:  Created by cabinet override    01/15 0659   01/14/20 1853    01/14/20 1230  EPINEPHrine (ADRENALIN) 5 mg in sodium chloride 0.9% 250 mL infusion     Question Answer Comment   Titrate by: (in mcg/kg/min) 0.01    Titrate interval: (in minutes) 5    Titrate to maintain: (SBP or MAP or Cardiac Index) SBP    Maximum dose: (in mcg/kg/min) 2        -- IV Continuous 01/14/20 1129        Hyperglycemia/Diabetes Medications:   Antihyperglycemics (From admission, onward)    Start     Stop Route Frequency Ordered    01/23/20 0900  insulin detemir U-100 pen 5 Units      -- SubQ Daily 01/22/20 1431    01/22/20 1531  insulin aspart U-100 pen 0-5 Units      -- SubQ Before meals & nightly PRN  "01/22/20 1431          ASSESSMENT and PLAN    * Acute coronary syndrome  Managed per primary team  Avoid hypoglycemia        Type 2 diabetes mellitus with hyperglycemia, with long-term current use of insulin  BG goal 140 - 180     BG trending downward on current SQ insulin regimen.   Discontinue Novolog 5 units TIDWM. Kidney function declining. Patient is now at risk for hypoglycemia.   Low Dose SQ Insulin Correction Scale.  BG Monitoring AC/HS     Will monitor for prandial excursions with diet progression    ** Please call Endocrine for any BG related issues **  ** Please notify Endocrine for any change and/or advance in diet**      Discharge Planning:     TBD. Please notify endocrinology prior to discharge. Patient will need adjustments to home insulin regimen.     Tentative:     Levemir dose TBD    Novolog dose TBD    Low Dose SQ Insulin Correction Scale.    BG Monitoring AC/HS     PRN Insurance preferred diabetes testing supplies    PRN Ultra-Fine Junie Pen Needles 4mm x 32 G (5/32" x 0.23mm).     Patient request outpatient follow-up with [facility] endo clinic for continued DM management.          ROSLYN (acute kidney injury)  Titrate insulin slowly to avoid hypoglycemia as the risk of hypoglycemia increases with decreased creatinine clearance.                Cody Jameson, NP  Endocrinology  [facility]  "

## 2020-01-24 NOTE — PLAN OF CARE
Problem: SLP Goal  Goal: SLP Goal  Description  Speech Language Pathology Goals  Goals expected to be met by 1/29:  1. Pt will tolerate least restrictive diet without s/s of aspiration.        Outcome: Ongoing, Progressing  Pt able to safely tolerate pureed consistencies and thin liquids, but appetite remains poor and PO intake limited.  Pt did not wish to accept solid trials to determine if safe for further diet advancement at this time.   MARISOL Castellon, CCC-SLP  Speech Language Pathologist  [phone]  1/24/2020

## 2020-01-24 NOTE — PLAN OF CARE
"      SICU PLAN OF CARE NOTE    Dx: Acute coronary syndrome    Shift Events: dobutamine off this am. Amio off at noon per orders. OOBTC with PT/OT. Improved balance, ROM today. Still requiring max assist x2. Eating very little.     Goals of Care: stepdown soon    Neuro: AAO x4, Follows Commands and Moves All Extremities    Vital Signs: /62 (BP Location: Left arm, Patient Position: Lying)   Pulse 65   Temp 98.1 °F (36.7 °C) (Oral)   Resp 12   Ht 5' 4" (1.626 m)   Wt 103.9 kg (229 lb 0.9 oz)   LMP 09/30/2015 (Exact Date)   SpO2 96%   Breastfeeding? No   BMI 39.32 kg/m²     Respiratory: Nasal Cannula    Diet: Pureed    Gtts: none    Urine Output: Anuric     Drains: none     Labs/Accuchecks: daily labs/ ac/hs accuchecks    Skin: blister to back in folds, blister to left arm. Skin tear to right inner thigh/buttock area.       "

## 2020-01-24 NOTE — PROGRESS NOTES
[facility]  Critical Care - Surgery  Progress Note    Patient Name: Suyapa Connelly  MRN: [id number]  Admission Date: 1/2/2020  Hospital Length of Stay: 22 days  Code Status: Full Code  Attending Provider: Huang Altamirano MD  Primary Care Provider: Erlinda Rahman NP   Principal Problem: Acute coronary syndrome    Subjective:     Hospital/ICU Course:  1/15: POD1. ON nausea without emesis. Zofran. 2L albumin, 5 amps bicarb. Clinically improving. 250 albumin running.   1/16: Increasing pressor requirements. Intubated for poor oxygenation, Left IJ trialysis placed for CRRT. Nitric added for RV support. Transfused pRBC x2 overnight.   1/19: Decreasing vent settings. Gases continue to look good. Decreasing pressor requirements. CRRT continues without issue. Paralysis discontinued.   1/20: Stayed on PS @ 8, FiO2 @ 40% most of the day. Minimal use of epi. CRRT continues.   1/21: Extubated. CRRT discontinued. CTs pulled.   1/22: Up in chair. Delirium improving with good sleep overnight. No UOP.   1/23: Brief episode of tachyarrythymia that resolved without intervention overnight. Completed HD.     Interval History/Significant Events: Around 5:30 am, pt had another episode of tachyarrythymia that required intervention. Rhythm converted with amio bolus and 2.5 metoprolol. Pt was asymptomatic with stable blood pressures during this. Otherwise NAEON.     Follow-up For: Procedure(s) (LRB):  CORONARY ARTERY BYPASS GRAFT (CABG) x 1     Off Pump (N/A)    Post-Operative Day: 10 Days Post-Op    Objective:     Vital Signs (Most Recent):  Temp: 98.3 °F (36.8 °C) (01/24/20 0700)  Pulse: 60 (01/24/20 0915)  Resp: (!) 22 (01/24/20 0915)  BP: (!) 107/59 (01/24/20 0900)  SpO2: 97 % (01/24/20 0915) Vital Signs (24h Range):  Temp:  [98.1 °F (36.7 °C)-98.7 °F (37.1 °C)] 98.3 °F (36.8 °C)  Pulse:  [] 60  Resp:  [14-37] 22  SpO2:  [91 %-100 %] 97 %  BP: ()/(51-70) 107/59  Arterial Line BP: ()/(43-70)  106/50     Weight: 103.9 kg (229 lb 0.9 oz)  Body mass index is 39.32 kg/m².      Intake/Output Summary (Last 24 hours) at 1/24/2020 0920  Last data filed at 1/24/2020 0400  Gross per 24 hour   Intake 654.34 ml   Output 2500 ml   Net -1845.66 ml       Physical Exam   Constitutional: She appears well-developed and well-nourished. She is cooperative.   HENT:   Head: Normocephalic and atraumatic.   Eyes: Pupils are equal, round, and reactive to light.   Neck: Normal range of motion. Neck supple.   RIJ D/C'd. Dressing CDI  LIJ trialysis CDI, no erythema, no tenderness, no drainage   Cardiovascular: Normal rate and regular rhythm.   Abdominal: Soft. She exhibits no distension. There is no rebound.   Musculoskeletal: Normal range of motion. She exhibits edema. She exhibits no deformity.   Left arm sore with movement   Neurological: She is alert.   Skin: Skin is warm and dry. No rash noted. No erythema.   Psychiatric: Her behavior is normal.   Nursing note and vitals reviewed.      Lines/Drains/Airways     Central Venous Catheter Line                 Trialysis (Dialysis) Catheter left internal jugular -- days          Arterial Line                 Arterial Line 01/16/20 2300 Right Radial 7 days          Line                 Pacer Wires 01/14/20 1009 9 days          Peripheral Intravenous Line                 Peripheral IV - Single Lumen 01/21/20 0645 22 G Left Hand 3 days         Midline Catheter Insertion/Assessment  - Single Lumen 01/21/20 1016 Right basilic vein (medial side of arm) 18g x 10cm 2 days                Significant Labs:    CBC/Anemia Profile:  Recent Labs   Lab 01/23/20  0321 01/24/20  0307   WBC 10.27 11.40   HGB 8.9* 9.1*   HCT 28.6* 29.4*    197   MCV 95 96   RDW 16.0* 15.8*        Chemistries:  Recent Labs   Lab 01/22/20  1720 01/23/20  0321 01/24/20  0119 01/24/20  0307   * 132* 128* 129*   K 4.2 4.1 4.4 4.6    99 100 100   CO2 22* 22* 19* 18*   BUN 16 21* 19 21*   CREATININE 2.5*  3.2* 3.3* 3.3*   CALCIUM 8.4* 8.8 9.1 9.0   ALBUMIN  --  2.5* 2.4* 2.4*   PROT  --  7.3 7.5 7.6   BILITOT  --  1.7* 1.3* 1.3*   ALKPHOS  --  346* 333* 331*   ALT  --  220* 171* 168*   AST  --  78* 55* 54*   MG 2.0 2.1 2.1  --    PHOS 4.4 4.8* 4.0  --        ABGs:   Recent Labs   Lab 01/22/20  1713   PH 7.417   PCO2 35.3   HCO3 22.7*   POCSATURATED 98   BE -2     Coagulation:   Recent Labs   Lab 01/24/20  0307   INR 1.0     Lactic Acid: No results for input(s): LACTATE in the last 48 hours.    Significant Imaging:  I have reviewed all pertinent imaging results/findings within the past 24 hours.    Assessment/Plan:     Acute on chronic combined systolic (congestive) and diastolic (congestive) heart failure  Neuro:   - multimodal pain regimen  - restarted home lexapro  - Has been off precedex  - orientation/delirium improves with quality sleep     Pulmonary:   - Extubated 1/21  - CXR daily  - ABG prn  - Pulmonary toilet  - IS     Cardiac:  - Epi off  - Dobutamine off 1/24  - Will start PO metoprolol and amio   - DC amio gtt at noon  - Echo 1/17:            Mildly decreased left ventricular systolic function. The estimated ejection fraction is 50%.  · Concentric left ventricular remodeling.  · Local segmental wall motion abnormalities.  · Septal wall has abnormal motion.  · Grade I (mild) left ventricular diastolic dysfunction consistent with impaired relaxation.  · Normal right ventricular systolic function.  · Severe tricuspid regurgitation.   -Pacer turned off    Renal:   - CRRT discontinued 1/21  - Successful HD trial 1/23  - Pt hyponatremic with worsening BUN/Cr, will contact nephrology  - Saumya out  - PermaCath to be scheduled, Gen Surgery aware     Infectious Disease:   - Afebrile, WBC 11  - Antibiotics: Perioperative     Hematology/Oncology:  - monitor H/H, today 9.1/29.4     Endocrine:  - Endocrine consulted, keep blood glucose goals 140-180     Fluids/Electrolytes/Nutrition/GI:   - D5 NS 20 KCl @ 5  - Trend  CMP  - LFTs trending down  - Replace Lytes PRN   - SLP following, cleared for pureed diet  - Scopolamine patch, PRN Zofran     Prophylaxis:  - GI: protonix daily     Dispo:  Continue ICU care, PT/OT  Primary team: CTS    Critical care was time spent personally by me on the following activities: development of treatment plan with patient or surrogate and bedside caregivers, discussions with consultants, evaluation of patient's response to treatment, examination of patient, ordering and performing treatments and interventions, ordering and review of laboratory studies, ordering and review of radiographic studies, pulse oximetry, re-evaluation of patient's condition.  This critical care time did not overlap with that of any other provider or involve time for any procedures.     Delmy Gill MD  Critical Care - Surgery  [facility]

## 2020-01-24 NOTE — PT/OT/SLP PROGRESS
Physical Therapy  Co-Treatment with OT    Patient Name:  Suyapa Connelly   MRN:  [id number]    Recommendations:     Discharge Recommendations:  rehabilitation facility   Discharge Equipment Recommendations: (will determine DME needs closer to discharge)   Barriers to discharge: Decreased caregiver support family  will not be able to assist at current functional level.     Assessment:     Suyapa Connelly is a 54 y.o. female admitted with a medical diagnosis of Acute coronary syndrome.  She presents with the following impairments/functional limitations:  weakness, gait instability, decreased lower extremity function, impaired balance, impaired endurance, impaired functional mobilty, decreased safety awareness, impaired coordination pt goldie treatment better being able to be gait training. Pt will benefit from cont skilled PT 5x/wk to progress physically. Pt will need inpt rehab placement when medically stable to maximize rehab potential. Pt is s/p CABG 1/14.    Rehab Prognosis: Good; patient would benefit from acute skilled PT services to address these deficits and reach maximum level of function.    Recent Surgery: Procedure(s) (LRB):  CORONARY ARTERY BYPASS GRAFT (CABG) x 1     Off Pump (N/A) 10 Days Post-Op    Plan:     During this hospitalization, patient to be seen 5 x/week to address the identified rehab impairments via gait training, therapeutic activities, therapeutic exercises, neuromuscular re-education and progress toward the following goals:    · Plan of Care Expires:  02/22/20    Subjective     Chief Complaint: pt c/o pain in back during treatment.   Patient/Family Comments/goals:  To get better and go home.   Pain/Comfort:  · Pain Rating 1: 8/10(back)  · Pain Rating Post-Intervention 1: 8/10      Objective:     Communicated with nurse prior to session.  Patient found supine with telemetry, arterial line, blood pressure cuff, pulse ox (continuous), central line, oxygen upon PT entry to room.     General  Precautions: Standard, fall, sternal   Orthopedic Precautions:N/A   Braces:       Functional Mobility:  · Bed Mobility:   Pt needed verbal cues for functional mobility with sternal precautions.   · Rolling Right: maximal assistance  · Supine to Sit: maximal assistance  ·   · Transfers:     · Sit to Stand:  moderate assistance with hand-held assist  ·   · Gait: pt received gait training ~ 3 ft with max assist x 2. pt had forward flexed posture and B decreased step length with gait training.       AM-PAC 6 CLICK MOBILITY  Turning over in bed (including adjusting bedclothes, sheets and blankets)?: 2  Sitting down on and standing up from a chair with arms (e.g., wheelchair, bedside commode, etc.): 2  Moving from lying on back to sitting on the side of the bed?: 2  Moving to and from a bed to a chair (including a wheelchair)?: 2  Need to walk in hospital room?: 2  Climbing 3-5 steps with a railing?: 1  Basic Mobility Total Score: 11       Therapeutic Activities and Exercises:   pt performed AROM there exer BLE in sitting x 10 reps.     Patient left up in chair with all lines intact and call button in reach..    GOALS:   Multidisciplinary Problems     Physical Therapy Goals        Problem: Physical Therapy Goal    Goal Priority Disciplines Outcome Goal Variances Interventions   Physical Therapy Goal     PT, PT/OT Ongoing, Progressing     Description:  Goals to be met by: 20    Patient will increase functional independence with mobility by performin. Supine to sit with Contact Guard Assistance -not met  2. Sit to stand transfer with Contact Guard Assistance -not met  3. Gait  x 150 feet with Contact Guard Assistance -not met  4. Ascend/descend 5 stair with left Handrails Contact Guard Assistance -not met                Multidisciplinary Problems (Resolved)        Problem: Physical Therapy Goal    Goal Priority Disciplines Outcome Goal Variances Interventions   Physical Therapy Goal   (Resolved)     PT, PT/OT Met      Description:  Eval only                    Time Tracking:     PT Received On: 01/24/20  PT Start Time: 0947     PT Stop Time: 1010  PT Total Time (min): 23 min     Billable Minutes: Gait Training 10 min and Therapeutic Exercise 13 min    Treatment Type: (co-treatment with OT)  PT/PTA: PT     PTA Visit Number: 0     Kelli Martinez, PT  01/24/2020

## 2020-01-24 NOTE — PROGRESS NOTES
[facility]  Nephrology  Progress Note    Patient Name: Suyapa Connelly  MRN: [id number]  Admission Date: 1/2/2020  Hospital Length of Stay: 22 days  Attending Provider: Huang Altamirano MD   Primary Care Physician: Erlinda Rahman NP  Principal Problem:Acute coronary syndrome    Subjective:     Interval History:   Tolerated HD yesterday, net UF of 1.8L.  Electrolytes stable this AM. She reports SOB with exertion that she believes is related to her anxiety.  Low BFR noted on dialysis, high venous pressures.    Overnight developed RVR which resolved with amio.    Review of patient's allergies indicates:   Allergen Reactions    Contrast media      Kidney injury    Pcn [penicillins]      Rash; tolerated ceftriaxone on 1/13/20     Current Facility-Administered Medications   Medication Frequency    0.9%  NaCl infusion Once    acetaminophen tablet 650 mg Q4H PRN    acetaminophen tablet 650 mg Q6H PRN    albuterol-ipratropium 2.5 mg-0.5 mg/3 mL nebulizer solution 3 mL Q4H PRN    [START ON 1/25/2020] amiodarone tablet 200 mg Daily    aspirin chewable tablet 81 mg Daily    atorvastatin tablet 80 mg QHS    bisacodyl suppository 10 mg Q12H PRN    clopidogrel tablet 75 mg Daily    dexmedetomidine (PRECEDEX) 400mcg/100mL 0.9% NaCL infusion Continuous    dextrose 10% (D10W) Bolus PRN    dextrose 10% (D10W) Bolus PRN    dextrose 5 % and 0.9 % NaCl with KCl 20 mEq infusion Continuous    EPINEPHrine (ADRENALIN) 5 mg in sodium chloride 0.9% 250 mL infusion Continuous    escitalopram oxalate tablet 10 mg Daily    glucagon (human recombinant) injection 1 mg PRN    glucose chewable tablet 16 g PRN    glucose chewable tablet 24 g PRN    heparin (porcine) injection 5,000 Units Q8H    HYDROmorphone injection 0.2 mg Q1H PRN    insulin aspart U-100 pen 0-5 Units QID (AC + HS) PRN    metoclopramide HCl injection 10 mg Q6H PRN    metoprolol injection 5 mg Q5 Min PRN    metoprolol succinate  (TOPROL-XL) 24 hr tablet 50 mg Daily    ondansetron injection 4 mg Q6H PRN    oxyCODONE immediate release tablet 10 mg Q4H PRN    oxyCODONE immediate release tablet 5 mg Q4H PRN    pantoprazole EC tablet 40 mg BID    polyethylene glycol packet 17 g Daily    scopolamine 1.3-1.5 mg (1 mg over 3 days) 1 patch Q3 Days       Objective:     Vital Signs (Most Recent):  Temp: 98.1 °F (36.7 °C) (01/24/20 1500)  Pulse: 61 (01/24/20 1545)  Resp: 16 (01/24/20 1545)  BP: (!) 147/64 (01/24/20 1500)  SpO2: 97 % (01/24/20 1545)  O2 Device (Oxygen Therapy): nasal cannula (01/24/20 1500) Vital Signs (24h Range):  Temp:  [98.1 °F (36.7 °C)-98.6 °F (37 °C)] 98.1 °F (36.7 °C)  Pulse:  [] 61  Resp:  [14-64] 16  SpO2:  [91 %-100 %] 97 %  BP: ()/(51-73) 147/64  Arterial Line BP: ()/(41-70) 124/52     Weight: 103.9 kg (229 lb 0.9 oz) (01/23/20 0500)  Body mass index is 39.32 kg/m².  Body surface area is 2.17 meters squared.    I/O last 3 completed shifts:  In: 1128.8 [P.O.:240; I.V.:438.8; Other:450]  Out: 2500 [Other:2500]    Physical Exam   Constitutional: She is oriented to person, place, and time. She appears well-developed and well-nourished. She is cooperative. She appears ill. No distress. Nasal cannula in place.   HENT:   Head: Normocephalic and atraumatic.   Right Ear: External ear normal.   Left Ear: External ear normal.   Mouth/Throat: Oropharynx is clear and moist. No oropharyngeal exudate.   Eyes: Right eye exhibits no discharge. Left eye exhibits no discharge. No scleral icterus.   Neck: Neck supple.   Cardiovascular: Normal rate.   No murmur heard.  Pulmonary/Chest: Effort normal. She has no wheezes. She has no rales.   Abdominal: Soft. She exhibits no distension and no mass. There is no guarding. No hernia.   Musculoskeletal: Normal range of motion. She exhibits edema. She exhibits no tenderness or deformity.   Neurological: She is alert and oriented to person, place, and time.   Skin: Skin is warm  and dry.       Significant Labs:  CBC:   Recent Labs   Lab 01/24/20  0307   WBC 11.40   RBC 3.06*   HGB 9.1*   HCT 29.4*      MCV 96   MCH 29.7   MCHC 31.0*     CMP:   Recent Labs   Lab 01/24/20  0307   *   CALCIUM 9.0   ALBUMIN 2.4*   PROT 7.6   *   K 4.6   CO2 18*      BUN 21*   CREATININE 3.3*   ALKPHOS 331*   *   AST 54*   BILITOT 1.3*            Assessment/Plan:     ROSLYN (acute kidney injury)  At time of re-consult: Ms Connelly is a 55 y/o female who is s/p CABG. Nephrology re-consulted for ROSLYN, decreased UOP, and concern for volume overload. Patient is now anuric with sCr trending up (4.4 on 1/16/20). Patient with metabolic acidosis on ABG/serum CO2 of 19. Patient now with concern for volume overload and now intubated after progressively increased oxygen requirements. Concern for pulmonary edema on CXR.     Plan/recommendations:   -no urgent need for RRT today, plan for next session tomorrow  -will order cathflo to dialysis catheter to improve BFR  -Renal diet   -Strict I/O's   -Renally dose medications   -Avoid nephrotoxic medications  -Daily renal function panels   -Maintain Hgb >7.0  -Will discuss with Dr. Anisa Vang, NP  Nephrology  [facility]

## 2020-01-24 NOTE — PLAN OF CARE
SW informed patient's  that [facility] is not in network with the patient's insurance. Patient's  and patient stated that their next choice is [facility].   EDUARD sent referral to [facility] via [facility].     Carey Archuleta, LCSW [facility]  [id number]

## 2020-01-24 NOTE — PLAN OF CARE
"      SICU PLAN OF CARE NOTE    Dx: Acute coronary syndrome    Shift Events: Pt went into afib with RVR once but converted to NSR with no interventions, see progress note for details. Abdominal xray obtained r/t abdominal pain, no significant findings. Additional episode of tachycardia at 0545, amio bolus given and gtt started, see progress note for details.    Goals of Care: Control rate and rhythm    Neuro: AAO x4, Follows Commands and Moves All Extremities    Vital Signs: /60 (BP Location: Left arm, Patient Position: Lying)   Pulse 84   Temp 98.6 °F (37 °C) (Oral)   Resp 19   Ht 5' 4" (1.626 m)   Wt 103.9 kg (229 lb 0.9 oz)   LMP 09/30/2015 (Exact Date)   SpO2 100%   Breastfeeding? No   BMI 39.32 kg/m²     Respiratory: Nasal Cannula 1L as needed    Diet: Pureed    Gtts: Amiodarone    Urine Output: Anuric     Drains: N/A    Labs/Accuchecks: Daily labs, accucheks ac/hs    Skin: Foam applied to skin tear on right inner thigh, no changes to blisters on back and arm       "

## 2020-01-24 NOTE — SUBJECTIVE & OBJECTIVE
Interval History:   Tolerated HD yesterday, net UF of 1.8L.  Electrolytes stable this AM. She reports SOB with exertion that she believes is related to her anxiety.  Low BFR noted on dialysis, high venous pressures.    Overnight developed RVR which resolved with amio.    Review of patient's allergies indicates:   Allergen Reactions    Contrast media      Kidney injury    Pcn [penicillins]      Rash; tolerated ceftriaxone on 1/13/20     Current Facility-Administered Medications   Medication Frequency    0.9%  NaCl infusion Once    acetaminophen tablet 650 mg Q4H PRN    acetaminophen tablet 650 mg Q6H PRN    albuterol-ipratropium 2.5 mg-0.5 mg/3 mL nebulizer solution 3 mL Q4H PRN    [START ON 1/25/2020] amiodarone tablet 200 mg Daily    aspirin chewable tablet 81 mg Daily    atorvastatin tablet 80 mg QHS    bisacodyl suppository 10 mg Q12H PRN    clopidogrel tablet 75 mg Daily    dexmedetomidine (PRECEDEX) 400mcg/100mL 0.9% NaCL infusion Continuous    dextrose 10% (D10W) Bolus PRN    dextrose 10% (D10W) Bolus PRN    dextrose 5 % and 0.9 % NaCl with KCl 20 mEq infusion Continuous    EPINEPHrine (ADRENALIN) 5 mg in sodium chloride 0.9% 250 mL infusion Continuous    escitalopram oxalate tablet 10 mg Daily    glucagon (human recombinant) injection 1 mg PRN    glucose chewable tablet 16 g PRN    glucose chewable tablet 24 g PRN    heparin (porcine) injection 5,000 Units Q8H    HYDROmorphone injection 0.2 mg Q1H PRN    insulin aspart U-100 pen 0-5 Units QID (AC + HS) PRN    metoclopramide HCl injection 10 mg Q6H PRN    metoprolol injection 5 mg Q5 Min PRN    metoprolol succinate (TOPROL-XL) 24 hr tablet 50 mg Daily    ondansetron injection 4 mg Q6H PRN    oxyCODONE immediate release tablet 10 mg Q4H PRN    oxyCODONE immediate release tablet 5 mg Q4H PRN    pantoprazole EC tablet 40 mg BID    polyethylene glycol packet 17 g Daily    scopolamine 1.3-1.5 mg (1 mg over 3 days) 1 patch Q3 Days        Objective:     Vital Signs (Most Recent):  Temp: 98.1 °F (36.7 °C) (01/24/20 1500)  Pulse: 61 (01/24/20 1545)  Resp: 16 (01/24/20 1545)  BP: (!) 147/64 (01/24/20 1500)  SpO2: 97 % (01/24/20 1545)  O2 Device (Oxygen Therapy): nasal cannula (01/24/20 1500) Vital Signs (24h Range):  Temp:  [98.1 °F (36.7 °C)-98.6 °F (37 °C)] 98.1 °F (36.7 °C)  Pulse:  [] 61  Resp:  [14-64] 16  SpO2:  [91 %-100 %] 97 %  BP: ()/(51-73) 147/64  Arterial Line BP: ()/(41-70) 124/52     Weight: 103.9 kg (229 lb 0.9 oz) (01/23/20 0500)  Body mass index is 39.32 kg/m².  Body surface area is 2.17 meters squared.    I/O last 3 completed shifts:  In: 1128.8 [P.O.:240; I.V.:438.8; Other:450]  Out: 2500 [Other:2500]    Physical Exam   Constitutional: She is oriented to person, place, and time. She appears well-developed and well-nourished. She is cooperative. She appears ill. No distress. Nasal cannula in place.   HENT:   Head: Normocephalic and atraumatic.   Right Ear: External ear normal.   Left Ear: External ear normal.   Mouth/Throat: Oropharynx is clear and moist. No oropharyngeal exudate.   Eyes: Right eye exhibits no discharge. Left eye exhibits no discharge. No scleral icterus.   Neck: Neck supple.   Cardiovascular: Normal rate.   No murmur heard.  Pulmonary/Chest: Effort normal. She has no wheezes. She has no rales.   Abdominal: Soft. She exhibits no distension and no mass. There is no guarding. No hernia.   Musculoskeletal: Normal range of motion. She exhibits edema. She exhibits no tenderness or deformity.   Neurological: She is alert and oriented to person, place, and time.   Skin: Skin is warm and dry.       Significant Labs:  CBC:   Recent Labs   Lab 01/24/20 0307   WBC 11.40   RBC 3.06*   HGB 9.1*   HCT 29.4*      MCV 96   MCH 29.7   MCHC 31.0*     CMP:   Recent Labs   Lab 01/24/20 0307   *   CALCIUM 9.0   ALBUMIN 2.4*   PROT 7.6   *   K 4.6   CO2 18*      BUN 21*   CREATININE 3.3*    ALKPHOS 331*   *   AST 54*   BILITOT 1.3*

## 2020-01-24 NOTE — ASSESSMENT & PLAN NOTE
"BG goal 140 - 180     BG trending downward on current SQ insulin regimen.   Discontinue Novolog 5 units TIDWM. Kidney function declining. Patient is now at risk for hypoglycemia.   Low Dose SQ Insulin Correction Scale.  BG Monitoring AC/HS     Will monitor for prandial excursions with diet progression    ** Please call Endocrine for any BG related issues **  ** Please notify Endocrine for any change and/or advance in diet**      Discharge Planning:     TBD. Please notify endocrinology prior to discharge. Patient will need adjustments to home insulin regimen.     Tentative:     Levemir dose TBD    Novolog dose TBD    Low Dose SQ Insulin Correction Scale.    BG Monitoring AC/HS     PRN Insurance preferred diabetes testing supplies    PRN Ultra-Fine Junie Pen Needles 4mm x 32 G (5/32" x 0.23mm).     Patient request outpatient follow-up with [facility] for continued DM management.        "

## 2020-01-24 NOTE — PT/OT/SLP PROGRESS
Occupational Therapy   Treatment    Name: Suyapa Connelly  MRN: [id number]  Admitting Diagnosis:  Acute coronary syndrome  10 Days Post-Op    Recommendations:     Discharge Recommendations: rehabilitation facility  Discharge Equipment Recommendations:  (TBD)  Barriers to discharge:  None    Assessment:     Suyapa Connelly is a 54 y.o. female with a medical diagnosis of Acute coronary syndrome.  She presents less lethargic with increased active participation. Performance deficits affecting function are weakness, impaired self care skills, impaired balance, impaired functional mobilty, impaired endurance, gait instability, decreased upper extremity function, decreased lower extremity function, pain, impaired cardiopulmonary response to activity, edema, decreased ROM.     Rehab Prognosis:  Good; patient would benefit from acute skilled OT services to address these deficits and reach maximum level of function.       Plan:     Patient to be seen 5 x/week to address the above listed problems via self-care/home management, therapeutic exercises, therapeutic activities  · Plan of Care Expires: 02/21/20  · Plan of Care Reviewed with: patient    Subjective     Pain/Comfort:  · Pain Rating 1: 0/10  · Pain Rating Post-Intervention 1: 0/10    Objective:     Communicated with: RN prior to session.  Patient found supine with blood pressure cuff, pulse ox (continuous), telemetry, arterial line, central line, peripheral IV, oxygen(Midline) upon OT entry to room.    General Precautions: Standard, fall, aspiration, sternal   Orthopedic Precautions:N/A   Braces:       Occupational Performance:     Bed Mobility:    · Patient completed Rolling/Turning to Right with maximal assistance  · Patient completed Supine to Sit with maximal assistance     Functional Mobility/Transfers:  · Patient completed Sit <> Stand Transfer with moderate assistance  with  no assistive device from EOB  · Patient completed Bed <> Chair Transfer using Step Transfer  technique with maximal assistance   · Functional Mobility: Max A x 2 x ~6 steps     Activities of Daily Living:  · Grooming: maximal assistance    · Upper Body Dressing: maximal assistance    · Lower Body Dressing: total assistance        AMPAC 6 Click ADL: 10    Treatment & Education:  Pt ed on OT POC  Pt completed grooming tasks seated  Pt completed B UE AAROM ex's x 8 reps in all planes with increased time to complete    Patient left up in chair with all lines intact, call button in reach and RM notifiedEducation:      GOALS:   Multidisciplinary Problems     Occupational Therapy Goals        Problem: Occupational Therapy Goal    Goal Priority Disciplines Outcome Interventions   Occupational Therapy Goal     OT, PT/OT Ongoing, Progressing    Description:  Goals to be met by: 2/1/20     Patient will increase functional independence with ADLs by performing:    Feeding with [place].  UE Dressing with Minimal Assistance.  LE Dressing with Moderate Assistance.  Grooming while standing at sink with Contact Guard Assistance.  Toileting from bedside commode with Moderate Assistance for hygiene and clothing management.   Toilet transfer to bedside commode with Minimal Assistance.                      Time Tracking:     OT Date of Treatment: 01/24/20  OT Start Time: 0946  OT Stop Time: 1009  OT Total Time (min): 23 min    Billable Minutes:Self Care/Home Management 10  Therapeutic Exercise 13    ANNY Foster  1/24/2020

## 2020-01-24 NOTE — SUBJECTIVE & OBJECTIVE
Interval History/Significant Events: Around 5:30 am, pt had another episode of tachyarrythymia that required intervention. Rhythm converted with amio bolus and 2.5 metoprolol. Pt was asymptomatic with stable blood pressures during this. Otherwise NAEON.     Follow-up For: Procedure(s) (LRB):  CORONARY ARTERY BYPASS GRAFT (CABG) x 1     Off Pump (N/A)    Post-Operative Day: 10 Days Post-Op    Objective:     Vital Signs (Most Recent):  Temp: 98.3 °F (36.8 °C) (01/24/20 0700)  Pulse: 60 (01/24/20 0915)  Resp: (!) 22 (01/24/20 0915)  BP: (!) 107/59 (01/24/20 0900)  SpO2: 97 % (01/24/20 0915) Vital Signs (24h Range):  Temp:  [98.1 °F (36.7 °C)-98.7 °F (37.1 °C)] 98.3 °F (36.8 °C)  Pulse:  [] 60  Resp:  [14-37] 22  SpO2:  [91 %-100 %] 97 %  BP: ()/(51-70) 107/59  Arterial Line BP: ()/(43-70) 106/50     Weight: 103.9 kg (229 lb 0.9 oz)  Body mass index is 39.32 kg/m².      Intake/Output Summary (Last 24 hours) at 1/24/2020 0920  Last data filed at 1/24/2020 0400  Gross per 24 hour   Intake 654.34 ml   Output 2500 ml   Net -1845.66 ml       Physical Exam   Constitutional: She appears well-developed and well-nourished. She is cooperative.   HENT:   Head: Normocephalic and atraumatic.   Eyes: Pupils are equal, round, and reactive to light.   Neck: Normal range of motion. Neck supple.   RIJ D/C'd. Dressing CDI  LIJ trialysis CDI, no erythema, no tenderness, no drainage   Cardiovascular: Normal rate and regular rhythm.   Abdominal: Soft. She exhibits no distension. There is no rebound.   Musculoskeletal: Normal range of motion. She exhibits edema. She exhibits no deformity.   Left arm sore with movement   Neurological: She is alert.   Skin: Skin is warm and dry. No rash noted. No erythema.   Psychiatric: Her behavior is normal.   Nursing note and vitals reviewed.      Lines/Drains/Airways     Central Venous Catheter Line                 Trialysis (Dialysis) Catheter left internal jugular -- days           Arterial Line                 Arterial Line 01/16/20 2300 Right Radial 7 days          Line                 Pacer Wires 01/14/20 1009 9 days          Peripheral Intravenous Line                 Peripheral IV - Single Lumen 01/21/20 0645 22 G Left Hand 3 days         Midline Catheter Insertion/Assessment  - Single Lumen 01/21/20 1016 Right basilic vein (medial side of arm) 18g x 10cm 2 days                Significant Labs:    CBC/Anemia Profile:  Recent Labs   Lab 01/23/20  0321 01/24/20  0307   WBC 10.27 11.40   HGB 8.9* 9.1*   HCT 28.6* 29.4*    197   MCV 95 96   RDW 16.0* 15.8*        Chemistries:  Recent Labs   Lab 01/22/20  1720 01/23/20  0321 01/24/20  0119 01/24/20  0307   * 132* 128* 129*   K 4.2 4.1 4.4 4.6    99 100 100   CO2 22* 22* 19* 18*   BUN 16 21* 19 21*   CREATININE 2.5* 3.2* 3.3* 3.3*   CALCIUM 8.4* 8.8 9.1 9.0   ALBUMIN  --  2.5* 2.4* 2.4*   PROT  --  7.3 7.5 7.6   BILITOT  --  1.7* 1.3* 1.3*   ALKPHOS  --  346* 333* 331*   ALT  --  220* 171* 168*   AST  --  78* 55* 54*   MG 2.0 2.1 2.1  --    PHOS 4.4 4.8* 4.0  --        ABGs:   Recent Labs   Lab 01/22/20  1713   PH 7.417   PCO2 35.3   HCO3 22.7*   POCSATURATED 98   BE -2     Coagulation:   Recent Labs   Lab 01/24/20  0307   INR 1.0     Lactic Acid: No results for input(s): LACTATE in the last 48 hours.    Significant Imaging:  I have reviewed all pertinent imaging results/findings within the past 24 hours.

## 2020-01-24 NOTE — PROGRESS NOTES
Pt went into afib with RVR, HR max 200. MD notified, orders for labs, 12 lead EKG, and metoprolol. Pt back into NSR without intervention, will continue to monitor.

## 2020-01-24 NOTE — PLAN OF CARE
"      SICU PLAN OF CARE NOTE    Dx: Acute coronary syndrome    Shift Events: Patient OOBTC with maximum assistance. BM today. Eating better. Advanced to pureed, thin liquid diet. Tolerated HD trial. Precedex weaned off.     Goals of Care: Possible stepdown tomorrow. Will need rehab.    Neuro: AAO x4, Follows Commands and Moves All Extremities    Vital Signs: BP (!) 103/51 (BP Location: Left arm, Patient Position: Lying)   Pulse 74   Temp 98.4 °F (36.9 °C) (Oral)   Resp (!) 21   Ht 5' 4" (1.626 m)   Wt 103.9 kg (229 lb 0.9 oz)   LMP 09/30/2015 (Exact Date)   SpO2 100%   Breastfeeding? No   BMI 39.32 kg/m²     Respiratory: Room Air    Diet: Pureed    Gtts: Dobutamine    Urine Output: Anuric     Drains: n/a     Labs/Accuchecks: daily labs/ AC/HS accuchecks    Skin: Blisters to back, left arm. Skintear/blister popped to right inner thigh.      "

## 2020-01-24 NOTE — PT/OT/SLP PROGRESS
"Speech Language Pathology Treatment    Patient Name:  Suyapa Connelly   MRN:  [id number]  Admitting Diagnosis: Acute coronary syndrome    Recommendations:                 General Recommendations:  monitor diet tolerance; assess for readiness for diet advancement  Diet recommendations:  Puree, Liquid Diet Level: Thin   Aspiration Precautions: 1 bite/sip at a time, Alternating bites/sips, Assistance with meals, Avoid talking while eating, Frequent oral care, HOB to 90 degrees, Meds whole 1 at a time, Monitor for s/s of aspiration, Remain upright 30 minutes post meal, Small bites/sips and Strict aspiration precautions   General Precautions: Standard, aspiration, fall, sternal  Communication strategies:  go to room if call light pushed    Subjective     "I'm not really hungry." Pt endorsing poor appetite.     Pain/Comfort:  · Pain Rating 1: (did not complain of pain)    Objective:     Has the patient been evaluated by SLP for swallowing?   Yes  Keep patient NPO? No   Current Respiratory Status: nasal cannula(.5L; SPO2 91-94%)      Pt found awake/alert sitting fully upright in bed.  Breakfast tray delivered upon SLP's entry.  Pt reported having a rough night and not getting much sleep.  She reported eating mashed potatoes yesterday evening.  Pt with limited acceptance of PO trials on this service date.  She accepted straw sips of juice x 4, 1 small bite of grits, 3 small bites of pureed eggs, and 1 small bites of pureed meats.  She orally expelled the meat due to dislike for taste.  No overt s/s of aspiration were observed.  Pt declined any further PO trials, including offerings of various soft solids and regular solids.  SLP explained that pt would remain on pureed diet until SLP was able to observe her tolerating soft solid trials.  SLP explained SLP services will not be following up until Monday. Pt expressed understanding. Nurse notified of today's assessment.     Assessment:     Suyapa Connelly is a 54 y.o. female with " an SLP diagnosis of Dysphagia and poor appetite.     Goals:   Multidisciplinary Problems     SLP Goals        Problem: SLP Goal    Goal Priority Disciplines Outcome   SLP Goal     SLP Ongoing, Progressing   Description:  Speech Language Pathology Goals  Goals expected to be met by 1/29:  1. Pt will tolerate least restrictive diet without s/s of aspiration.                         Plan:     · Patient to be seen:  5 x/week   · Plan of Care expires:  02/21/20  · Plan of Care reviewed with:  patient   · SLP Follow-Up:  Yes       Discharge recommendations:  rehabilitation facility     Time Tracking:     SLP Treatment Date:   01/24/20  Speech Start Time:  0837  Speech Stop Time:  0848     Speech Total Time (min):  11 min    Billable Minutes: Treatment Swallowing Dysfunction 11    MARISOL Castellon, CCC-SLP  01/24/2020     MARISOL Castellon, CCC-SLP  Speech Language Pathologist  [phone]  1/24/2020

## 2020-01-24 NOTE — ASSESSMENT & PLAN NOTE
Neuro:   - multimodal pain regimen  - restarted home lexapro  - Has been off precedex  - orientation/delirium improves with quality sleep     Pulmonary:   - Extubated 1/21  - CXR daily  - ABG prn  - Pulmonary toilet  - IS     Cardiac:  - Epi off  - Dobutamine off 1/24  - Will start PO metoprolol and amio   - DC amio gtt at noon  - Echo 1/17:            Mildly decreased left ventricular systolic function. The estimated ejection fraction is 50%.  · Concentric left ventricular remodeling.  · Local segmental wall motion abnormalities.  · Septal wall has abnormal motion.  · Grade I (mild) left ventricular diastolic dysfunction consistent with impaired relaxation.  · Normal right ventricular systolic function.  · Severe tricuspid regurgitation.   -Pacer turned off    Renal:   - CRRT discontinued 1/21  - Successful HD trial 1/23  - Pt hyponatremic with worsening BUN/Cr, will contact nephrology  - Saumya out  - PermaCath to be scheduled, Gen Surgery aware     Infectious Disease:   - Afebrile, WBC 11  - Antibiotics: Perioperative     Hematology/Oncology:  - monitor H/H, today 9.1/29.4     Endocrine:  - Endocrine consulted, keep blood glucose goals 140-180     Fluids/Electrolytes/Nutrition/GI:   - D5 NS 20 KCl @ 5  - Trend CMP  - LFTs trending down  - Replace Lytes PRN   - SLP following, cleared for pureed diet  - Scopolamine patch, PRN Zofran     Prophylaxis:  - GI: protonix daily     Dispo:  Continue ICU care, PT/OT  Primary team: JI

## 2020-01-24 NOTE — PLAN OF CARE
Problem: Occupational Therapy Goal  Goal: Occupational Therapy Goal  Description  Goals to be met by: 2/1/20     Patient will increase functional independence with ADLs by performing:    Feeding with [place].  UE Dressing with Minimal Assistance.  LE Dressing with Moderate Assistance.  Grooming while standing at sink with Contact Guard Assistance.  Toileting from bedside commode with Moderate Assistance for hygiene and clothing management.   Toilet transfer to bedside commode with Minimal Assistance.     Outcome: Ongoing, Progressing   The above goals remain appropriate. ANNY Foster 1/24/2020

## 2020-01-24 NOTE — PLAN OF CARE
Problem: Physical Therapy Goal  Goal: Physical Therapy Goal  Description  Goals to be met by: 20    Patient will increase functional independence with mobility by performin. Supine to sit with Contact Guard Assistance -not met  2. Sit to stand transfer with Contact Guard Assistance -not met  3. Gait  x 150 feet with Contact Guard Assistance -not met  4. Ascend/descend 5 stair with left Handrails Contact Guard Assistance -not met     Outcome: Ongoing, Progressing   Goals remain appropriate. Kelli Martinez, PT  2020

## 2020-01-25 LAB
ALBUMIN SERPL BCP-MCNC: 2.3 G/DL (ref 3.5–5.2)
ALP SERPL-CCNC: 327 U/L (ref 55–135)
ALT SERPL W/O P-5'-P-CCNC: 131 U/L (ref 10–44)
ANION GAP SERPL CALC-SCNC: 12 MMOL/L (ref 8–16)
AST SERPL-CCNC: 49 U/L (ref 10–40)
BASOPHILS # BLD AUTO: 0.02 K/UL (ref 0–0.2)
BASOPHILS NFR BLD: 0.2 % (ref 0–1.9)
BILIRUB SERPL-MCNC: 1 MG/DL (ref 0.1–1)
BUN SERPL-MCNC: 32 MG/DL (ref 6–20)
CALCIUM SERPL-MCNC: 9 MG/DL (ref 8.7–10.5)
CHLORIDE SERPL-SCNC: 99 MMOL/L (ref 95–110)
CO2 SERPL-SCNC: 17 MMOL/L (ref 23–29)
CREAT SERPL-MCNC: 4.3 MG/DL (ref 0.5–1.4)
DIFFERENTIAL METHOD: ABNORMAL
EOSINOPHIL # BLD AUTO: 0.1 K/UL (ref 0–0.5)
EOSINOPHIL NFR BLD: 0.7 % (ref 0–8)
ERYTHROCYTE [DISTWIDTH] IN BLOOD BY AUTOMATED COUNT: 15.7 % (ref 11.5–14.5)
EST. GFR  (AFRICAN AMERICAN): 12.7 ML/MIN/1.73 M^2
EST. GFR  (NON AFRICAN AMERICAN): 11.1 ML/MIN/1.73 M^2
GLUCOSE SERPL-MCNC: 148 MG/DL (ref 70–110)
HCT VFR BLD AUTO: 29 % (ref 37–48.5)
HGB BLD-MCNC: 8.9 G/DL (ref 12–16)
IMM GRANULOCYTES # BLD AUTO: 0.06 K/UL (ref 0–0.04)
IMM GRANULOCYTES NFR BLD AUTO: 0.5 % (ref 0–0.5)
INR PPP: 1.1 (ref 0.8–1.2)
LYMPHOCYTES # BLD AUTO: 1.1 K/UL (ref 1–4.8)
LYMPHOCYTES NFR BLD: 9.9 % (ref 18–48)
MCH RBC QN AUTO: 29.5 PG (ref 27–31)
MCHC RBC AUTO-ENTMCNC: 30.7 G/DL (ref 32–36)
MCV RBC AUTO: 96 FL (ref 82–98)
MONOCYTES # BLD AUTO: 1.9 K/UL (ref 0.3–1)
MONOCYTES NFR BLD: 17 % (ref 4–15)
NEUTROPHILS # BLD AUTO: 7.9 K/UL (ref 1.8–7.7)
NEUTROPHILS NFR BLD: 71.7 % (ref 38–73)
NRBC BLD-RTO: 0 /100 WBC
PLATELET # BLD AUTO: 244 K/UL (ref 150–350)
PMV BLD AUTO: 11.3 FL (ref 9.2–12.9)
POCT GLUCOSE: 125 MG/DL (ref 70–110)
POCT GLUCOSE: 129 MG/DL (ref 70–110)
POCT GLUCOSE: 142 MG/DL (ref 70–110)
POCT GLUCOSE: 146 MG/DL (ref 70–110)
POTASSIUM SERPL-SCNC: 5.1 MMOL/L (ref 3.5–5.1)
PROT SERPL-MCNC: 7.6 G/DL (ref 6–8.4)
PROTHROMBIN TIME: 11.1 SEC (ref 9–12.5)
RBC # BLD AUTO: 3.02 M/UL (ref 4–5.4)
SODIUM SERPL-SCNC: 128 MMOL/L (ref 136–145)
WBC # BLD AUTO: 11.05 K/UL (ref 3.9–12.7)

## 2020-01-25 PROCEDURE — 25000003 PHARM REV CODE 250: Performed by: STUDENT IN AN ORGANIZED HEALTH CARE EDUCATION/TRAINING PROGRAM

## 2020-01-25 PROCEDURE — 99232 PR SUBSEQUENT HOSPITAL CARE,LEVL II: ICD-10-PCS | Mod: ,,, | Performed by: INTERNAL MEDICINE

## 2020-01-25 PROCEDURE — 80053 COMPREHEN METABOLIC PANEL: CPT

## 2020-01-25 PROCEDURE — 99232 SBSQ HOSP IP/OBS MODERATE 35: CPT | Mod: ,,, | Performed by: INTERNAL MEDICINE

## 2020-01-25 PROCEDURE — 99232 SBSQ HOSP IP/OBS MODERATE 35: CPT | Mod: ,,, | Performed by: SURGERY

## 2020-01-25 PROCEDURE — 25000003 PHARM REV CODE 250: Performed by: SURGERY

## 2020-01-25 PROCEDURE — 25000003 PHARM REV CODE 250: Performed by: THORACIC SURGERY (CARDIOTHORACIC VASCULAR SURGERY)

## 2020-01-25 PROCEDURE — 85610 PROTHROMBIN TIME: CPT

## 2020-01-25 PROCEDURE — 94664 DEMO&/EVAL PT USE INHALER: CPT

## 2020-01-25 PROCEDURE — 99900035 HC TECH TIME PER 15 MIN (STAT)

## 2020-01-25 PROCEDURE — 27000221 HC OXYGEN, UP TO 24 HOURS

## 2020-01-25 PROCEDURE — 63600175 PHARM REV CODE 636 W HCPCS: Performed by: SURGERY

## 2020-01-25 PROCEDURE — 20000000 HC ICU ROOM

## 2020-01-25 PROCEDURE — 85025 COMPLETE CBC W/AUTO DIFF WBC: CPT

## 2020-01-25 PROCEDURE — 99232 PR SUBSEQUENT HOSPITAL CARE,LEVL II: ICD-10-PCS | Mod: ,,, | Performed by: SURGERY

## 2020-01-25 PROCEDURE — 94761 N-INVAS EAR/PLS OXIMETRY MLT: CPT

## 2020-01-25 PROCEDURE — 80100014 HC HEMODIALYSIS 1:1

## 2020-01-25 RX ADMIN — HEPARIN SODIUM 5000 UNITS: 5000 INJECTION, SOLUTION INTRAVENOUS; SUBCUTANEOUS at 03:01

## 2020-01-25 RX ADMIN — PANTOPRAZOLE SODIUM 40 MG: 40 TABLET, DELAYED RELEASE ORAL at 08:01

## 2020-01-25 RX ADMIN — PANTOPRAZOLE SODIUM 40 MG: 40 TABLET, DELAYED RELEASE ORAL at 09:01

## 2020-01-25 RX ADMIN — ASPIRIN 81 MG CHEWABLE TABLET 81 MG: 81 TABLET CHEWABLE at 08:01

## 2020-01-25 RX ADMIN — ATORVASTATIN CALCIUM 80 MG: 20 TABLET, FILM COATED ORAL at 09:01

## 2020-01-25 RX ADMIN — ESCITALOPRAM 10 MG: 5 TABLET, FILM COATED ORAL at 08:01

## 2020-01-25 RX ADMIN — AMIODARONE HYDROCHLORIDE 200 MG: 200 TABLET ORAL at 08:01

## 2020-01-25 RX ADMIN — OXYCODONE HYDROCHLORIDE 10 MG: 10 TABLET ORAL at 07:01

## 2020-01-25 RX ADMIN — OXYCODONE HYDROCHLORIDE 10 MG: 10 TABLET ORAL at 06:01

## 2020-01-25 RX ADMIN — HEPARIN SODIUM 5000 UNITS: 5000 INJECTION, SOLUTION INTRAVENOUS; SUBCUTANEOUS at 09:01

## 2020-01-25 RX ADMIN — METOPROLOL SUCCINATE 50 MG: 50 TABLET, EXTENDED RELEASE ORAL at 08:01

## 2020-01-25 RX ADMIN — CLOPIDOGREL BISULFATE 75 MG: 75 TABLET, FILM COATED ORAL at 08:01

## 2020-01-25 NOTE — PROGRESS NOTES
Suyapa Connelly is a 54 y.o. female patient.  No new complaints today-scheduled for HD today  Scheduled Meds:   sodium chloride 0.9%   Intravenous Once    amiodarone  200 mg Oral Daily    aspirin  81 mg Oral Daily    atorvastatin  80 mg Oral QHS    clopidogrel  75 mg Oral Daily    escitalopram oxalate  10 mg Oral Daily    heparin (porcine)  5,000 Units Subcutaneous Q8H    metoprolol succinate  50 mg Oral Daily    pantoprazole  40 mg Oral BID    polyethylene glycol  17 g Oral Daily    scopolamine  1 patch Transdermal Q3 Days       Review of patient's allergies indicates:   Allergen Reactions    Contrast media      Kidney injury    Pcn [penicillins]      Rash; tolerated ceftriaxone on 1/13/20       Past Medical History:   Diagnosis Date    Anxiety     Depression     Diabetes mellitus     type 2    GERD (gastroesophageal reflux disease)     Hyperlipemia     Hypertension     Myocardial infarction 2010    minor-caused by stress per pt.     Past Surgical History:   Procedure Laterality Date    Angiogram - Right Extremity Right 7/9/15    angiogram-left leg  10/6/15    CATHETERIZATION OF BOTH LEFT AND RIGHT HEART N/A 12/18/2019    Procedure: CATHETERIZATION, HEART, BOTH LEFT AND RIGHT;  Surgeon: Que Fernando III, MD;  Location: [facility] CATH LAB;  Service: Cardiology;  Laterality: N/A;    CORONARY ANGIOGRAPHY N/A 12/18/2019    Procedure: ANGIOGRAM, CORONARY ARTERY;  Surgeon: Que Fernando III, MD;  Location: [facility] CATH LAB;  Service: Cardiology;  Laterality: N/A;    CORONARY ARTERY BYPASS GRAFT (CABG) N/A 1/14/2020    Procedure: CORONARY ARTERY BYPASS GRAFT (CABG) x 1     Off Pump;  Surgeon: Huang Altamirano MD;  Location: [address];  Service: Cardiovascular;  Laterality: N/A;      reports that she has never smoked. She has never used smokeless tobacco. She reports that she has current or past drug history. Drug: Marijuana. She reports that she does not drink alcohol.   Family History    Problem Relation Age of Onset    Anesthesia problems Neg Hx           Vital Signs Range (Last 24H):  Temp:  [97.9 °F (36.6 °C)-98.5 °F (36.9 °C)]   Pulse:  [58-67]   Resp:  [12-29]   BP: (122-147)/(58-64)   SpO2:  [89 %-100 %]   Arterial Line BP: ()/(44-60)     I & O (Last 24H):    Intake/Output Summary (Last 24 hours) at 1/25/2020 1203  Last data filed at 1/24/2020 1700  Gross per 24 hour   Intake 288 ml   Output --   Net 288 ml           Physical Exam:  Constitutional: She is oriented to person, place, and time. She appears well-developed and well-nourished. She is cooperative. No distress. Nasal cannula in place.   Head: Normocephalic and atraumatic.   Right Ear: External ear normal.   Left Ear: External ear normal.   Mouth/Throat: Oropharynx is clear and moist. No oropharyngeal exudate.   Eyes: Right eye exhibits no discharge. Left eye exhibits no discharge. No scleral icterus.   Neck: Neck supple.   Cardiovascular: Normal rate.   No murmur heard.  Pulmonary/Chest: Effort normal. She has no wheezes.   Abdominal: Soft. She exhibits no distension and no mass. There is no guarding. No hernia.   Musculoskeletal: Normal range of motion. She exhibits edema. She exhibits no tenderness or deformity.   Neurological: She is alert and oriented to person, place, and time.   Skin: Skin is warm and dry.      Laboratory:  CBC:   Recent Labs   Lab 01/25/20  0308   WBC 11.05   RBC 3.02*   HGB 8.9*   HCT 29.0*      MCV 96   MCH 29.5   MCHC 30.7*     BMP:   Recent Labs   Lab 01/24/20  0119  01/25/20  0308   *   < > 148*   *   < > 128*   K 4.4   < > 5.1      < > 99   CO2 19*   < > 17*   BUN 19   < > 32*   CREATININE 3.3*   < > 4.3*   CALCIUM 9.1   < > 9.0   MG 2.1  --   --     < > = values in this interval not displayed.         Diagnostic Results:  ROSLYN (acute kidney injury)  Plan/recommendations:   -Will plan for a intermittent HD trial today for metabolic clearance and volume management, target  UF 1-2 L as tolerated, keep MAP >65.  -Renal diet   -Strict I/O's   -Renally dose medications   -Avoid nephrotoxic medications  -Daily renal function panels   -Maintain Hgb >7.0          Carmelo Simmons  1/25/2020

## 2020-01-25 NOTE — CARE UPDATE
BG goal 140 - 180      BG is within goal on current PRN Novolog correction scale. Appetite remains minimal, and kidney function remains poor, which is most likely cause of decreased insulin needs.      Low Dose SQ Insulin Correction Scale PRN BG excursions.   BG Monitoring AC/HS.      ** Please call Endocrine for any BG related issues **  ** Please notify Endocrine for any change and/or advance in diet**     Discharge Planning:    TBD. Please notify endocrinology prior to discharge.

## 2020-01-25 NOTE — PROGRESS NOTES
[facility]  Critical Care - Surgery  Progress Note    Patient Name: Suyapa Connelly  MRN: [id number]  Admission Date: 1/2/2020  Hospital Length of Stay: 23 days  Code Status: Full Code  Attending Provider: Huang Altamirano MD  Primary Care Provider: Erlinda Rahman NP   Principal Problem: Acute coronary syndrome    Subjective:     Hospital/ICU Course:  1/15: POD1. ON nausea without emesis. Zofran. 2L albumin, 5 amps bicarb. Clinically improving. 250 albumin running.   1/16: Increasing pressor requirements. Intubated for poor oxygenation, Left IJ trialysis placed for CRRT. Nitric added for RV support. Transfused pRBC x2 overnight.   1/19: Decreasing vent settings. Gases continue to look good. Decreasing pressor requirements. CRRT continues without issue. Paralysis discontinued.   1/20: Stayed on PS @ 8, FiO2 @ 40% most of the day. Minimal use of epi. CRRT continues.   1/21: Extubated. CRRT discontinued. CTs pulled.   1/22: Up in chair. Delirium improving with good sleep overnight. No UOP.   1/23: Brief episode of tachyarrythymia that resolved without intervention overnight. Completed HD.     Interval History/Significant Events: No acute events overnight. Off Dobutamine. No HD overnight. Per vascular surgery will need to await permacath placement until ready for discharge.    Follow-up For: Procedure(s) (LRB):  CORONARY ARTERY BYPASS GRAFT (CABG) x 1     Off Pump (N/A)    Post-Operative Day: 11 Days Post-Op    Objective:     Vital Signs (Most Recent):  Temp: 98.5 °F (36.9 °C) (01/24/20 1900)  Pulse: 65 (01/24/20 2130)  Resp: (!) 23 (01/24/20 2130)  BP: 138/62 (01/24/20 1600)  SpO2: 100 % (01/24/20 2130) Vital Signs (24h Range):  Temp:  [98.1 °F (36.7 °C)-98.6 °F (37 °C)] 98.5 °F (36.9 °C)  Pulse:  [] 65  Resp:  [12-64] 23  SpO2:  [91 %-100 %] 100 %  BP: ()/(52-73) 138/62  Arterial Line BP: ()/(41-70) 135/59     Weight: 103.9 kg (229 lb 0.9 oz)  Body mass index is 39.32  kg/m².      Intake/Output Summary (Last 24 hours) at 1/25/2020 0039  Last data filed at 1/24/2020 1700  Gross per 24 hour   Intake 365.34 ml   Output --   Net 365.34 ml       Physical Exam   Constitutional: She appears well-developed and well-nourished. She is cooperative.   HENT:   Head: Normocephalic and atraumatic.   Eyes: Pupils are equal, round, and reactive to light.   Neck: Normal range of motion. Neck supple.   RIJ D/C'd. Dressing CDI  LIJ trialysis CDI, no erythema, no tenderness, no drainage   Cardiovascular: Normal rate and regular rhythm.   Abdominal: Soft. She exhibits no distension. There is no rebound.   Musculoskeletal: Normal range of motion. She exhibits edema. She exhibits no deformity.   Left arm sore with movement   Neurological: She is alert.   Skin: Skin is warm and dry. No rash noted. No erythema.   Psychiatric: Her behavior is normal.   Nursing note and vitals reviewed.      Lines/Drains/Airways     Central Venous Catheter Line                 Trialysis (Dialysis) Catheter left internal jugular -- days          Arterial Line                 Arterial Line 01/16/20 2300 Right Radial 8 days          Line                 Pacer Wires 01/14/20 1009 10 days          Peripheral Intravenous Line                 Midline Catheter Insertion/Assessment  - Single Lumen 01/21/20 1016 Right basilic vein (medial side of arm) 18g x 10cm 3 days         Peripheral IV - Single Lumen 01/21/20 0645 22 G Left Hand 3 days                Significant Labs:    CBC/Anemia Profile:  Recent Labs   Lab 01/23/20  0321 01/24/20  0307   WBC 10.27 11.40   HGB 8.9* 9.1*   HCT 28.6* 29.4*    197   MCV 95 96   RDW 16.0* 15.8*        Chemistries:  Recent Labs   Lab 01/23/20  0321 01/24/20  0119 01/24/20  0307   * 128* 129*   K 4.1 4.4 4.6   CL 99 100 100   CO2 22* 19* 18*   BUN 21* 19 21*   CREATININE 3.2* 3.3* 3.3*   CALCIUM 8.8 9.1 9.0   ALBUMIN 2.5* 2.4* 2.4*   PROT 7.3 7.5 7.6   BILITOT 1.7* 1.3* 1.3*   ALKPHOS  346* 333* 331*   * 171* 168*   AST 78* 55* 54*   MG 2.1 2.1  --    PHOS 4.8* 4.0  --        ABGs:   No results for input(s): PH, PCO2, HCO3, POCSATURATED, BE in the last 48 hours.  Coagulation:   Recent Labs   Lab 01/24/20  0307   INR 1.0     Lactic Acid: No results for input(s): LACTATE in the last 48 hours.    Significant Imaging:  I have reviewed all pertinent imaging results/findings within the past 24 hours.    Assessment/Plan:     Acute on chronic combined systolic (congestive) and diastolic (congestive) heart failure  Neuro:   - multimodal pain regimen  - restarted home lexapro  - Has been off precedex  - orientation/delirium improves with quality sleep     Pulmonary:   - Extubated 1/21  - CXR daily  - ABG prn  - Pulmonary toilet  - IS     Cardiac:  - Epi off  - Dobutamine off 1/24  - Will start PO metoprolol and amio   - DC amio gtt at noon  - Echo 1/17:            Mildly decreased left ventricular systolic function. The estimated ejection fraction is 50%.  · Concentric left ventricular remodeling.  · Local segmental wall motion abnormalities.  · Septal wall has abnormal motion.  · Grade I (mild) left ventricular diastolic dysfunction consistent with impaired relaxation.  · Normal right ventricular systolic function.  · Severe tricuspid regurgitation.   -Pacer turned off    Renal:   - CRRT discontinued 1/21  - Successful HD trial 1/23  - Pt hyponatremic with worsening BUN/Cr, plan for HD today  - Kerns out  - PermaCath to be scheduled, call vascular surgery when closure to discharge     Infectious Disease:   - Afebrile, WBC 11  - Antibiotics: Perioperative     Hematology/Oncology:  - monitor H/H, today 9.1/29.4     Endocrine:  - Endocrine consulted, keep blood glucose goals 140-180     Fluids/Electrolytes/Nutrition/GI:   - D5 NS 20 KCl @ 5  - Trend CMP  - LFTs trending down  - Replace Lytes PRN   - SLP following, cleared for pureed diet  - Scopolamine patch, PRN Zofran     Prophylaxis:  - GI: protonix  daily     Dispo:  Continue ICU care, PT/OT  Primary team: CTS         Critical care was time spent personally by me on the following activities: development of treatment plan with patient or surrogate and bedside caregivers, discussions with consultants, evaluation of patient's response to treatment, examination of patient, ordering and performing treatments and interventions, ordering and review of laboratory studies, ordering and review of radiographic studies, pulse oximetry, re-evaluation of patient's condition.  This critical care time did not overlap with that of any other provider or involve time for any procedures.     Christina Galvez MD  Critical Care - Surgery  [facility]

## 2020-01-25 NOTE — PLAN OF CARE
Plan of Care Note  Cardiothoracic Surgery    Suyapa Connelly is a 54 y.o. female with CAD, DM, HF s/p CABG (1/14/20).    Specific issues: intermittent dialysis; CPT; mobilization; permacath before discharge    Plan of care for patient was discussed with ICU staff including nurses, residents, and faculty and appropriate consulting services.    Will continue to monitor patient's hemodynamics, functionality, neuro status, fluid status and renal function, and labs and will adjust medications and fluids as necessary while monitoring appropriateness for de-escalation of support and monitoring and transport to stepdown unit.    Christian Dobson MD  Cardiothoracic Surgery Fellow

## 2020-01-25 NOTE — PROGRESS NOTES
Arrived to patient's room. Watching TV. AAO x 4. Agreed to 3 hour HD Tx.  UF goal 2L. Lt IJ arterial and venous  cath lumen sluggish to aspirate and flush. Unsuccessful line reversal.

## 2020-01-25 NOTE — ASSESSMENT & PLAN NOTE
Neuro:   - multimodal pain regimen  - restarted home lexapro  - Has been off precedex  - orientation/delirium improves with quality sleep     Pulmonary:   - Extubated 1/21  - CXR daily  - ABG prn  - Pulmonary toilet  - IS     Cardiac:  - Epi off  - Dobutamine off 1/24  - Will start PO metoprolol and amio   - DC amio gtt at noon  - Echo 1/17:            Mildly decreased left ventricular systolic function. The estimated ejection fraction is 50%.  · Concentric left ventricular remodeling.  · Local segmental wall motion abnormalities.  · Septal wall has abnormal motion.  · Grade I (mild) left ventricular diastolic dysfunction consistent with impaired relaxation.  · Normal right ventricular systolic function.  · Severe tricuspid regurgitation.   -Pacer turned off    Renal:   - CRRT discontinued 1/21  - Successful HD trial 1/23  - Pt hyponatremic with worsening BUN/Cr, plan for HD today  - Kerns out  - PermaCath to be scheduled, call vascular surgery when closure to discharge     Infectious Disease:   - Afebrile, WBC 11  - Antibiotics: Perioperative     Hematology/Oncology:  - monitor H/H, today 9.1/29.4     Endocrine:  - Endocrine consulted, keep blood glucose goals 140-180     Fluids/Electrolytes/Nutrition/GI:   - D5 NS 20 KCl @ 5  - Trend CMP  - LFTs trending down  - Replace Lytes PRN   - SLP following, cleared for pureed diet  - Scopolamine patch, PRN Zofran     Prophylaxis:  - GI: protonix daily     Dispo:  Continue ICU care, PT/OT  Primary team: JI

## 2020-01-25 NOTE — SUBJECTIVE & OBJECTIVE
Interval History/Significant Events: No acute events overnight. Off Dobutamine. No HD overnight. Per vascular surgery will need to await permacath placement until ready for discharge.    Follow-up For: Procedure(s) (LRB):  CORONARY ARTERY BYPASS GRAFT (CABG) x 1     Off Pump (N/A)    Post-Operative Day: 11 Days Post-Op    Objective:     Vital Signs (Most Recent):  Temp: 98.5 °F (36.9 °C) (01/24/20 1900)  Pulse: 65 (01/24/20 2130)  Resp: (!) 23 (01/24/20 2130)  BP: 138/62 (01/24/20 1600)  SpO2: 100 % (01/24/20 2130) Vital Signs (24h Range):  Temp:  [98.1 °F (36.7 °C)-98.6 °F (37 °C)] 98.5 °F (36.9 °C)  Pulse:  [] 65  Resp:  [12-64] 23  SpO2:  [91 %-100 %] 100 %  BP: ()/(52-73) 138/62  Arterial Line BP: ()/(41-70) 135/59     Weight: 103.9 kg (229 lb 0.9 oz)  Body mass index is 39.32 kg/m².      Intake/Output Summary (Last 24 hours) at 1/25/2020 0039  Last data filed at 1/24/2020 1700  Gross per 24 hour   Intake 365.34 ml   Output --   Net 365.34 ml       Physical Exam   Constitutional: She appears well-developed and well-nourished. She is cooperative.   HENT:   Head: Normocephalic and atraumatic.   Eyes: Pupils are equal, round, and reactive to light.   Neck: Normal range of motion. Neck supple.   RIJ D/C'd. Dressing CDI  LIJ trialysis CDI, no erythema, no tenderness, no drainage   Cardiovascular: Normal rate and regular rhythm.   Abdominal: Soft. She exhibits no distension. There is no rebound.   Musculoskeletal: Normal range of motion. She exhibits edema. She exhibits no deformity.   Left arm sore with movement   Neurological: She is alert.   Skin: Skin is warm and dry. No rash noted. No erythema.   Psychiatric: Her behavior is normal.   Nursing note and vitals reviewed.      Lines/Drains/Airways     Central Venous Catheter Line                 Trialysis (Dialysis) Catheter left internal jugular -- days          Arterial Line                 Arterial Line 01/16/20 2300 Right Radial 8 days           Line                 Pacer Wires 01/14/20 1009 10 days          Peripheral Intravenous Line                 Midline Catheter Insertion/Assessment  - Single Lumen 01/21/20 1016 Right basilic vein (medial side of arm) 18g x 10cm 3 days         Peripheral IV - Single Lumen 01/21/20 0645 22 G Left Hand 3 days                Significant Labs:    CBC/Anemia Profile:  Recent Labs   Lab 01/23/20  0321 01/24/20  0307   WBC 10.27 11.40   HGB 8.9* 9.1*   HCT 28.6* 29.4*    197   MCV 95 96   RDW 16.0* 15.8*        Chemistries:  Recent Labs   Lab 01/23/20  0321 01/24/20  0119 01/24/20  0307   * 128* 129*   K 4.1 4.4 4.6   CL 99 100 100   CO2 22* 19* 18*   BUN 21* 19 21*   CREATININE 3.2* 3.3* 3.3*   CALCIUM 8.8 9.1 9.0   ALBUMIN 2.5* 2.4* 2.4*   PROT 7.3 7.5 7.6   BILITOT 1.7* 1.3* 1.3*   ALKPHOS 346* 333* 331*   * 171* 168*   AST 78* 55* 54*   MG 2.1 2.1  --    PHOS 4.8* 4.0  --        ABGs:   No results for input(s): PH, PCO2, HCO3, POCSATURATED, BE in the last 48 hours.  Coagulation:   Recent Labs   Lab 01/24/20  0307   INR 1.0     Lactic Acid: No results for input(s): LACTATE in the last 48 hours.    Significant Imaging:  I have reviewed all pertinent imaging results/findings within the past 24 hours.

## 2020-01-25 NOTE — PROGRESS NOTES
VSS, 2L NC, pt remains AAOx4, afebrile. Pt able to rest overnight. No urine return upon Kerns insertion, pt denies urge to void. No gtts. No new skin breakdown. No acute events overnight.

## 2020-01-25 NOTE — PLAN OF CARE
Plan of care notation:    No s/s of distress today.  Oxycodone provides full effective pain relief.  Plan for hemodialysis today per orders, with Dialysis RN presenting to bedside this afternoon for ordered treatment.  Plan of care reviewed with pt and pt's , and verbalization of understanding obtained (but reinforcement required).  Emotional support offered.

## 2020-01-25 NOTE — PLAN OF CARE
Plan of Care Note  Cardiothoracic Surgery    Suyapa Connelly is a 54 y.o. female with CAD, DM, HF s/p CABG (1/14/20).    Specific issues: intermittent dialysis; CPT; mobilization; sinus tachy this morning- toprol 50    Plan of care for patient was discussed with ICU staff including nurses, residents, and faculty and appropriate consulting services.    Will continue to monitor patient's hemodynamics, functionality, neuro status, fluid status and renal function, and labs and will adjust medications and fluids as necessary while monitoring appropriateness for de-escalation of support and monitoring and transport to stepdown unit.    Christian Dobson MD  Cardiothoracic Surgery Fellow

## 2020-01-25 NOTE — CONSULTS
VASCULAR SURGERY SERVICE  CONSULTATION NOTE    CHIEF COMPLAINT: permcath placement    HISTORY OF PRESENT ILLNESS: Suyapa Connelly is a 54 y.o. female admitted to SICU for postoperative care after CABG. Unfortunately, her course has been complicated by anuric ROSLYN, metabolic acidosis, and volume overload. She has started on hemodialysis via a temporary line. .     Past Medical History:   Diagnosis Date    Anxiety     Depression     Diabetes mellitus     type 2    GERD (gastroesophageal reflux disease)     Hyperlipemia     Hypertension     Myocardial infarction 2010    minor-caused by stress per pt.       Past Surgical History:   Procedure Laterality Date    Angiogram - Right Extremity Right 7/9/15    angiogram-left leg  10/6/15    CATHETERIZATION OF BOTH LEFT AND RIGHT HEART N/A 12/18/2019    Procedure: CATHETERIZATION, HEART, BOTH LEFT AND RIGHT;  Surgeon: Que Fernando III, MD;  Location: [facility] CATH LAB;  Service: Cardiology;  Laterality: N/A;    CORONARY ANGIOGRAPHY N/A 12/18/2019    Procedure: ANGIOGRAM, CORONARY ARTERY;  Surgeon: Que Fernando III, MD;  Location: [facility] CATH LAB;  Service: Cardiology;  Laterality: N/A;    CORONARY ARTERY BYPASS GRAFT (CABG) N/A 1/14/2020    Procedure: CORONARY ARTERY BYPASS GRAFT (CABG) x 1     Off Pump;  Surgeon: Huang Altamirano MD;  Location: [facility] OR [address] [place] [address];  Service: Cardiovascular;  Laterality: N/A;         Current Facility-Administered Medications:     0.9%  NaCl infusion, , Intravenous, Once, Sarah Saba DNP, FNP-C    acetaminophen tablet 650 mg, 650 mg, Per OG tube, Q4H PRN, Christian Dobson MD    acetaminophen tablet 650 mg, 650 mg, Oral, Q6H PRN, Christian Dobson MD    albuterol-ipratropium 2.5 mg-0.5 mg/3 mL nebulizer solution 3 mL, 3 mL, Nebulization, Q4H PRN, Negrita Cordova MD    [START ON 1/25/2020] amiodarone tablet 200 mg, 200 mg, Oral, Daily, Christina Galvez MD    aspirin chewable tablet 81 mg, 81 mg, Oral, Daily,  Aubrey Lozano MD, 81 mg at 01/24/20 0809    atorvastatin tablet 80 mg, 80 mg, Oral, QHS, Christian Dobson MD, 80 mg at 01/23/20 2113    bisacodyl suppository 10 mg, 10 mg, Rectal, Q12H PRN, Christian Dobson MD    clopidogrel tablet 75 mg, 75 mg, Oral, Daily, Aubrey Lozano MD, 75 mg at 01/24/20 0809    dexmedetomidine (PRECEDEX) 400mcg/100mL 0.9% NaCL infusion, 0.2 mcg/kg/hr, Intravenous, Continuous, Christina Galvez MD, Stopped at 01/23/20 1300    dextrose 10% (D10W) Bolus, 12.5 g, Intravenous, PRN, Christian Dobson MD, 125 mL at 01/15/20 0806    dextrose 10% (D10W) Bolus, 25 g, Intravenous, PRN, Christian Dobson MD    dextrose 5 % and 0.9 % NaCl with KCl 20 mEq infusion, 5 mL/hr, Intravenous, Continuous, Christian Dobson MD, Last Rate: 5 mL/hr at 01/17/20 0500, 5 mL/hr at 01/17/20 0500    EPINEPHrine (ADRENALIN) 5 mg in sodium chloride 0.9% 250 mL infusion, 0.02 mcg/kg/min, Intravenous, Continuous, Christian Dobson MD, Stopped at 01/22/20 0030    escitalopram oxalate tablet 10 mg, 10 mg, Oral, Daily, Christina Galvez MD, 10 mg at 01/24/20 0809    glucagon (human recombinant) injection 1 mg, 1 mg, Intramuscular, PRN, Mary Baptiste NP    glucose chewable tablet 16 g, 16 g, Oral, PRN, Mary Baptiste NP    glucose chewable tablet 24 g, 24 g, Oral, PRN, Mary Baptiste NP    heparin (porcine) injection 5,000 Units, 5,000 Units, Subcutaneous, Q8H, Christian Dobson MD, 5,000 Units at 01/24/20 1558    HYDROmorphone injection 0.2 mg, 0.2 mg, Intravenous, Q1H PRN, Bharath Parra MD, 0.2 mg at 01/23/20 2243    insulin aspart U-100 pen 0-5 Units, 0-5 Units, Subcutaneous, QID (AC + HS) PRN, Mary Baptiste, ANDREA    metoclopramide HCl injection 10 mg, 10 mg, Intravenous, Q6H PRN, Christina Galvez MD, 10 mg at 01/16/20 0900    metoprolol injection 5 mg, 5 mg, Intravenous, Q5 Min PRN, Rogers Waite MD, 2.5 mg at 01/24/20 0543    metoprolol succinate  (TOPROL-XL) 24 hr tablet 50 mg, 50 mg, Oral, Daily, Huang Altamirano MD, 50 mg at 01/24/20 0809    ondansetron injection 4 mg, 4 mg, Intravenous, Q6H PRN, Christina Galvez MD    oxyCODONE immediate release tablet 10 mg, 10 mg, Oral, Q4H PRN, Christian Dobson MD, 10 mg at 01/23/20 1307    oxyCODONE immediate release tablet 5 mg, 5 mg, Oral, Q4H PRN, Christian Dobson MD, 5 mg at 01/24/20 0809    pantoprazole EC tablet 40 mg, 40 mg, Oral, BID, Christina Galvez MD, 40 mg at 01/24/20 0809    polyethylene glycol packet 17 g, 17 g, Oral, Daily, Christian Dobson MD, 17 g at 01/21/20 1000    scopolamine 1.3-1.5 mg (1 mg over 3 days) 1 patch, 1 patch, Transdermal, Q3 Days, Christina Galvez MD, 1 patch at 01/24/20 1130    Review of patient's allergies indicates:   Allergen Reactions    Contrast media      Kidney injury    Pcn [penicillins]      Rash; tolerated ceftriaxone on 1/13/20       Family History   Problem Relation Age of Onset    Anesthesia problems Neg Hx        Social History     Tobacco Use    Smoking status: Never Smoker    Smokeless tobacco: Never Used   Substance Use Topics    Alcohol use: No    Drug use: Yes     Types: Marijuana     Comment: used yesterday       REVIEW OF SYSTEMS:  General: No chills, fever, malaise, changes in weight  HEENT: No visual changes, difficulty hearing  Cardiovascular: No chest pain, palpitations, claudication  Pulmonary: No dyspnea, cough, wheezing  Gastrointestinal: No nausea, vomiting, diarrhea, constipation  Genitourinary: No dysuria, low urine output, hematuria  Endocrine: No polydipsia, polyphagia  Hematologic: No fatigue with exertion, pica, pallor  Musculoskeletal: No extremity or joint pain, no back pain, no difficulty with ambulation  Neurologic: no seizures, no headaches, no weakness  Psychiatric: no mood disturbance    PHYSICAL EXAM:   /62 (BP Location: Left arm, Patient Position: Lying)   Pulse 65   Temp 98.1 °F (36.7 °C) (Oral)   " Resp 16   Ht 5' 4" (1.626 m)   Wt 103.9 kg (229 lb 0.9 oz)   LMP 09/30/2015 (Exact Date)   SpO2 95%   Breastfeeding? No   BMI 39.32 kg/m²   Constitutional:  Alert,   Well-appearing  In no distress.   Neurological: Normal speech  no focal findings  CN II - XII grossly intact.    Psychiatric: Mood and affect appropriate and symmetric.   HEENT: Normocephalic / atraumatic  PERRLA  Midline trachea  No scars across the neck   Cardiac: Regular rate and rhythm.   Pulmonary: Normal pulmonary effort.   Abdomen: Soft, not distended.    Skin: Warm and well perfused.      DIAGNOSTICS:  Recent Labs     01/22/20  0109 01/23/20  0321 01/24/20  0307   WBC 12.25 10.27 11.40   HGB 7.4* 8.9* 9.1*   HCT 23.7* 28.6* 29.4*    172 197        Recent Labs     01/23/20  0321 01/24/20  0119 01/24/20  0307   * 128* 129*   K 4.1 4.4 4.6   CL 99 100 100   CO2 22* 19* 18*   BUN 21* 19 21*   CREATININE 3.2* 3.3* 3.3*   CALCIUM 8.8 9.1 9.0   ANIONGAP 11 9 11   ESTGFRAFRICA 18.2* 17.5* 17.5*   EGFRNONAA 15.8* 15.2* 15.2*       Recent Labs     01/23/20  0321 01/24/20  0119 01/24/20  0307   * 171* 168*   AST 78* 55* 54*   ALKPHOS 346* 333* 331*   BILITOT 1.7* 1.3* 1.3*       IMPRESSION & PLAN:  54 y.o. female with anuric ROSLYN s/p CABG. Currently dialyzing through Trialysis line.     Would not place PermCath until she is near ready for discharge.    Please call when discharge is being arranged, and we will place a PermCath prior to DC.     Layo Hernandez MD PGY6  Vascular and Endovascular Surgery Fellow  01/24/2020    "

## 2020-01-26 LAB
ALBUMIN SERPL BCP-MCNC: 2.3 G/DL (ref 3.5–5.2)
ALP SERPL-CCNC: 290 U/L (ref 55–135)
ALT SERPL W/O P-5'-P-CCNC: 109 U/L (ref 10–44)
ANION GAP SERPL CALC-SCNC: 11 MMOL/L (ref 8–16)
AST SERPL-CCNC: 38 U/L (ref 10–40)
BASOPHILS # BLD AUTO: 0.04 K/UL (ref 0–0.2)
BASOPHILS NFR BLD: 0.3 % (ref 0–1.9)
BILIRUB SERPL-MCNC: 1 MG/DL (ref 0.1–1)
BUN SERPL-MCNC: 27 MG/DL (ref 6–20)
CALCIUM SERPL-MCNC: 9.2 MG/DL (ref 8.7–10.5)
CHLORIDE SERPL-SCNC: 102 MMOL/L (ref 95–110)
CO2 SERPL-SCNC: 19 MMOL/L (ref 23–29)
CREAT SERPL-MCNC: 3.7 MG/DL (ref 0.5–1.4)
DIFFERENTIAL METHOD: ABNORMAL
EOSINOPHIL # BLD AUTO: 0.1 K/UL (ref 0–0.5)
EOSINOPHIL NFR BLD: 0.5 % (ref 0–8)
ERYTHROCYTE [DISTWIDTH] IN BLOOD BY AUTOMATED COUNT: 15.6 % (ref 11.5–14.5)
EST. GFR  (AFRICAN AMERICAN): 15.3 ML/MIN/1.73 M^2
EST. GFR  (NON AFRICAN AMERICAN): 13.3 ML/MIN/1.73 M^2
GLUCOSE SERPL-MCNC: 143 MG/DL (ref 70–110)
HCT VFR BLD AUTO: 29 % (ref 37–48.5)
HGB BLD-MCNC: 9 G/DL (ref 12–16)
IMM GRANULOCYTES # BLD AUTO: 0.08 K/UL (ref 0–0.04)
IMM GRANULOCYTES NFR BLD AUTO: 0.5 % (ref 0–0.5)
INR PPP: 1.2 (ref 0.8–1.2)
LYMPHOCYTES # BLD AUTO: 1.1 K/UL (ref 1–4.8)
LYMPHOCYTES NFR BLD: 7.5 % (ref 18–48)
MCH RBC QN AUTO: 29.2 PG (ref 27–31)
MCHC RBC AUTO-ENTMCNC: 31 G/DL (ref 32–36)
MCV RBC AUTO: 94 FL (ref 82–98)
MONOCYTES # BLD AUTO: 1.9 K/UL (ref 0.3–1)
MONOCYTES NFR BLD: 13.1 % (ref 4–15)
NEUTROPHILS # BLD AUTO: 11.4 K/UL (ref 1.8–7.7)
NEUTROPHILS NFR BLD: 78.1 % (ref 38–73)
NRBC BLD-RTO: 0 /100 WBC
PLATELET # BLD AUTO: 282 K/UL (ref 150–350)
PMV BLD AUTO: 11.2 FL (ref 9.2–12.9)
POCT GLUCOSE: 167 MG/DL (ref 70–110)
POCT GLUCOSE: 170 MG/DL (ref 70–110)
POCT GLUCOSE: 180 MG/DL (ref 70–110)
POTASSIUM SERPL-SCNC: 4.5 MMOL/L (ref 3.5–5.1)
PROT SERPL-MCNC: 7.9 G/DL (ref 6–8.4)
PROTHROMBIN TIME: 11.8 SEC (ref 9–12.5)
RBC # BLD AUTO: 3.08 M/UL (ref 4–5.4)
SODIUM SERPL-SCNC: 132 MMOL/L (ref 136–145)
WBC # BLD AUTO: 14.65 K/UL (ref 3.9–12.7)

## 2020-01-26 PROCEDURE — 63600175 PHARM REV CODE 636 W HCPCS: Performed by: STUDENT IN AN ORGANIZED HEALTH CARE EDUCATION/TRAINING PROGRAM

## 2020-01-26 PROCEDURE — 25000003 PHARM REV CODE 250: Performed by: THORACIC SURGERY (CARDIOTHORACIC VASCULAR SURGERY)

## 2020-01-26 PROCEDURE — 94761 N-INVAS EAR/PLS OXIMETRY MLT: CPT

## 2020-01-26 PROCEDURE — 63600175 PHARM REV CODE 636 W HCPCS: Performed by: SURGERY

## 2020-01-26 PROCEDURE — 27000221 HC OXYGEN, UP TO 24 HOURS

## 2020-01-26 PROCEDURE — 85025 COMPLETE CBC W/AUTO DIFF WBC: CPT

## 2020-01-26 PROCEDURE — 99900035 HC TECH TIME PER 15 MIN (STAT)

## 2020-01-26 PROCEDURE — 25000003 PHARM REV CODE 250: Performed by: STUDENT IN AN ORGANIZED HEALTH CARE EDUCATION/TRAINING PROGRAM

## 2020-01-26 PROCEDURE — 94664 DEMO&/EVAL PT USE INHALER: CPT

## 2020-01-26 PROCEDURE — 25000003 PHARM REV CODE 250: Performed by: SURGERY

## 2020-01-26 PROCEDURE — 85610 PROTHROMBIN TIME: CPT

## 2020-01-26 PROCEDURE — 80053 COMPREHEN METABOLIC PANEL: CPT

## 2020-01-26 PROCEDURE — 20600001 HC STEP DOWN PRIVATE ROOM

## 2020-01-26 RX ADMIN — CLOPIDOGREL BISULFATE 75 MG: 75 TABLET, FILM COATED ORAL at 08:01

## 2020-01-26 RX ADMIN — HEPARIN SODIUM 5000 UNITS: 5000 INJECTION, SOLUTION INTRAVENOUS; SUBCUTANEOUS at 06:01

## 2020-01-26 RX ADMIN — METOPROLOL SUCCINATE 50 MG: 50 TABLET, EXTENDED RELEASE ORAL at 08:01

## 2020-01-26 RX ADMIN — ATORVASTATIN CALCIUM 80 MG: 20 TABLET, FILM COATED ORAL at 08:01

## 2020-01-26 RX ADMIN — AMIODARONE HYDROCHLORIDE 200 MG: 200 TABLET ORAL at 08:01

## 2020-01-26 RX ADMIN — OXYCODONE HYDROCHLORIDE 10 MG: 10 TABLET ORAL at 03:01

## 2020-01-26 RX ADMIN — HEPARIN SODIUM 5000 UNITS: 5000 INJECTION, SOLUTION INTRAVENOUS; SUBCUTANEOUS at 11:01

## 2020-01-26 RX ADMIN — PANTOPRAZOLE SODIUM 40 MG: 40 TABLET, DELAYED RELEASE ORAL at 08:01

## 2020-01-26 RX ADMIN — ASPIRIN 81 MG CHEWABLE TABLET 81 MG: 81 TABLET CHEWABLE at 08:01

## 2020-01-26 RX ADMIN — ESCITALOPRAM 10 MG: 5 TABLET, FILM COATED ORAL at 08:01

## 2020-01-26 RX ADMIN — INSULIN ASPART 3 UNITS: 100 INJECTION, SOLUTION INTRAVENOUS; SUBCUTANEOUS at 04:01

## 2020-01-26 RX ADMIN — OXYCODONE HYDROCHLORIDE 10 MG: 10 TABLET ORAL at 11:01

## 2020-01-26 RX ADMIN — HEPARIN SODIUM 5000 UNITS: 5000 INJECTION, SOLUTION INTRAVENOUS; SUBCUTANEOUS at 01:01

## 2020-01-26 NOTE — PROGRESS NOTES
Pt to be stepped down to CTSU per MD orders.  Report received from DEANGELO Palma of SICU.  Telemetry notified of pending transfer.  Awaiting pt arrival.     1350  Pt arrived on unit via bed with telemetry in place.  Pt on 2L via NC with IVs SL.

## 2020-01-26 NOTE — PLAN OF CARE
Plan of Care Note  Cardiothoracic Surgery    Suyapa Connelly is a 54 y.o. female with CAD, DM, HF s/p CABG (1/14/20).    Specific issues: intermittent dialysis; CPT; mobilization; permacath tomorrow; d/c dialysis catheter given leukocytosis; d/c [place] and transfer to stepdown; d/c wires    Plan of care for patient was discussed with ICU staff including nurses, residents, and faculty and appropriate consulting services.    Will continue to monitor patient's hemodynamics, functionality, neuro status, fluid status and renal function, and labs and will adjust medications and fluids as necessary while monitoring appropriateness for de-escalation of support and monitoring and transport to stepdown unit.    Christian Dobson MD  Cardiothoracic Surgery Fellow

## 2020-01-26 NOTE — PROGRESS NOTES
[facility]  Critical Care - Surgery  Progress Note    Patient Name: Suyapa Connelly  MRN: [id number]  Admission Date: 1/2/2020  Hospital Length of Stay: 24 days  Code Status: Full Code  Attending Provider: Huang Altamirano MD  Primary Care Provider: Erlinda Rahman NP   Principal Problem: Acute coronary syndrome    Subjective:     Hospital/ICU Course:  1/15: POD1. ON nausea without emesis. Zofran. 2L albumin, 5 amps bicarb. Clinically improving. 250 albumin running.   1/16: Increasing pressor requirements. Intubated for poor oxygenation, Left IJ trialysis placed for CRRT. Nitric added for RV support. Transfused pRBC x2 overnight.   1/19: Decreasing vent settings. Gases continue to look good. Decreasing pressor requirements. CRRT continues without issue. Paralysis discontinued.   1/20: Stayed on PS @ 8, FiO2 @ 40% most of the day. Minimal use of epi. CRRT continues.   1/21: Extubated. CRRT discontinued. CTs pulled.   1/22: Up in chair. Delirium improving with good sleep overnight. No UOP.   1/23: Brief episode of tachyarrythymia that resolved without intervention overnight. Completed HD.     Interval History/Significant Events:    No major events overnight.  Tolerated HD   On 2L NC  MAP 70s    Follow-up For: Procedure(s) (LRB):  CORONARY ARTERY BYPASS GRAFT (CABG) x 1     Off Pump (N/A)    Post-Operative Day: 11 Days Post-Op    Objective:     Vital Signs (Most Recent):  Temp: 97.8 °F (36.6 °C) (01/26/20 1350)  Pulse: 67 (01/26/20 1350)  Resp: 16 (01/26/20 1350)  BP: (!) 140/63 (01/26/20 1350)  SpO2: 95 % (01/26/20 1400) Vital Signs (24h Range):  Temp:  [97.8 °F (36.6 °C)-98.4 °F (36.9 °C)] 97.8 °F (36.6 °C)  Pulse:  [62-75] 67  Resp:  [11-31] 16  SpO2:  [94 %-100 %] 95 %  BP: (116-167)/(57-71) 140/63  Arterial Line BP: ()/(48-64) 125/53     Weight: 103.9 kg (229 lb 0.9 oz)  Body mass index is 39.32 kg/m².      Intake/Output Summary (Last 24 hours) at 1/26/2020 1510  Last data filed at  1/26/2020 0315  Gross per 24 hour   Intake 390 ml   Output 2500 ml   Net -2110 ml       Physical Exam   Constitutional: She appears well-developed and well-nourished. She is cooperative.   HENT:   Head: Normocephalic and atraumatic.   Eyes: Pupils are equal, round, and reactive to light.   Neck: Normal range of motion. Neck supple.   Prior RIJ dressing CDI  LIJ trialysis CDI, no erythema, no tenderness, no drainage   Cardiovascular: Normal rate and regular rhythm.   Abdominal: Soft. She exhibits no distension. There is no rebound.   Musculoskeletal: Normal range of motion. She exhibits edema. She exhibits no deformity.   Left arm sore with movement   Neurological: She is alert.   Skin: Skin is warm and dry. No rash noted. No erythema.   Psychiatric: Her behavior is normal.   Nursing note and vitals reviewed.      Lines/Drains/Airways     Peripheral Intravenous Line                 Midline Catheter Insertion/Assessment  - Single Lumen 01/21/20 1016 Right basilic vein (medial side of arm) 18g x 10cm 5 days                Significant Labs:    CBC/Anemia Profile:  Recent Labs   Lab 01/25/20 0308 01/26/20 0311   WBC 11.05 14.65*   HGB 8.9* 9.0*   HCT 29.0* 29.0*    282   MCV 96 94   RDW 15.7* 15.6*        Chemistries:  Recent Labs   Lab 01/25/20 0308 01/26/20 0311   * 132*   K 5.1 4.5   CL 99 102   CO2 17* 19*   BUN 32* 27*   CREATININE 4.3* 3.7*   CALCIUM 9.0 9.2   ALBUMIN 2.3* 2.3*   PROT 7.6 7.9   BILITOT 1.0 1.0   ALKPHOS 327* 290*   * 109*   AST 49* 38       ABGs:   No results for input(s): PH, PCO2, HCO3, POCSATURATED, BE in the last 48 hours.  Coagulation:   Recent Labs   Lab 01/26/20 0311   INR 1.2     Lactic Acid: No results for input(s): LACTATE in the last 48 hours.    Significant Imaging:  I have reviewed all pertinent imaging results/findings within the past 24 hours.    Assessment/Plan:     Acute on chronic combined systolic (congestive) and diastolic (congestive) heart  failure  Neuro:   - multimodal pain regimen  - restarted home lexapro  - Has been off precedex  - orientation/delirium improves with quality sleep     Pulmonary:   - Extubated 1/21  - CXR daily  - ABG prn  - Pulmonary toilet  - IS  - wean NC     Cardiac:  - Epi off  - Dobutamine off 1/24  - PO metoprolol and amio   - Echo 1/17:            Mildly decreased left ventricular systolic function. The estimated ejection fraction is 50%.  · Concentric left ventricular remodeling.  · Local segmental wall motion abnormalities.  · Septal wall has abnormal motion.  · Grade I (mild) left ventricular diastolic dysfunction consistent with impaired relaxation.  · Normal right ventricular systolic function.  · Severe tricuspid regurgitation.   -Pacer turned off  - d/c'd V wires  - d/c A line    Renal:   - CRRT discontinued 1/21  - Successful HD trial 1/23, 1/25  - cont hyponatremia and elevated Cr, no HD today  - PermaCath tomorrow with vascular sx, OK for plavix and heparin  - d/c LIJ trialysis    Infectious Disease:   - Afebrile, WBC increased from day prior  - Antibiotics: Perioperative, now complete     Hematology/Oncology:  - monitor H/H, today stable     Endocrine:  - Endocrine consulted, keep blood glucose goals 140-180     Fluids/Electrolytes/Nutrition/GI:   - D5 NS 20 KCl @ 5  - Trend CMP  - LFTs cont to trend down  - Replace Lytes PRN   - SLP following, cleared for pureed diet  - NPO at midnight for permacath tomorrow  - Scopolamine patch, PRN Zofran     Prophylaxis:  - GI: protonix daily     Dispo:  Stepdown to [facility]          Bharath Parra MD  Critical Care - Surgery  [facility]

## 2020-01-26 NOTE — CARE UPDATE
- 180     BG is within goal on current PRN Novolog correction regimen. However, expect future increase in insulin needs as kidney function continues to improve. Will continue to monitor, and restart MDI regimen as BG trends upward.     Low Dose SQ Insulin Correction Scale PRN BG excursions. Kidney function remains poor, but seems to be improving per chart review.   BG Monitoring AC/HS     ** Please call Endocrine for any BG related issues **  ** Please notify Endocrine for any change and/or advance in diet**    Discharge Planning:    TBD. Please notify endocrinology prior to discharge.

## 2020-01-26 NOTE — NURSING TRANSFER
Nursing Transfer Note      1/26/2020     Transfer To: [id number]    Transfer via bed     Transfer with cardiac monitoring    Chart send with patient: Yes    Notified: spouse    Patient reassessed at: 01/26/2020 13:00    Upon arrival to floor: cardiac monitor applied, patient oriented to room, call bell in reach and bed in lowest position, RN notified of pt's arrival and presents to bedside to assume care.

## 2020-01-26 NOTE — SUBJECTIVE & OBJECTIVE
Interval History/Significant Events:    No major events overnight.  Tolerated HD   On 2L NC  MAP 70s    Follow-up For: Procedure(s) (LRB):  CORONARY ARTERY BYPASS GRAFT (CABG) x 1     Off Pump (N/A)    Post-Operative Day: 11 Days Post-Op    Objective:     Vital Signs (Most Recent):  Temp: 97.8 °F (36.6 °C) (01/26/20 1350)  Pulse: 67 (01/26/20 1350)  Resp: 16 (01/26/20 1350)  BP: (!) 140/63 (01/26/20 1350)  SpO2: 95 % (01/26/20 1400) Vital Signs (24h Range):  Temp:  [97.8 °F (36.6 °C)-98.4 °F (36.9 °C)] 97.8 °F (36.6 °C)  Pulse:  [62-75] 67  Resp:  [11-31] 16  SpO2:  [94 %-100 %] 95 %  BP: (116-167)/(57-71) 140/63  Arterial Line BP: ()/(48-64) 125/53     Weight: 103.9 kg (229 lb 0.9 oz)  Body mass index is 39.32 kg/m².      Intake/Output Summary (Last 24 hours) at 1/26/2020 1510  Last data filed at 1/26/2020 0315  Gross per 24 hour   Intake 390 ml   Output 2500 ml   Net -2110 ml       Physical Exam   Constitutional: She appears well-developed and well-nourished. She is cooperative.   HENT:   Head: Normocephalic and atraumatic.   Eyes: Pupils are equal, round, and reactive to light.   Neck: Normal range of motion. Neck supple.   Prior RIJ dressing CDI  LIJ trialysis CDI, no erythema, no tenderness, no drainage   Cardiovascular: Normal rate and regular rhythm.   Abdominal: Soft. She exhibits no distension. There is no rebound.   Musculoskeletal: Normal range of motion. She exhibits edema. She exhibits no deformity.   Left arm sore with movement   Neurological: She is alert.   Skin: Skin is warm and dry. No rash noted. No erythema.   Psychiatric: Her behavior is normal.   Nursing note and vitals reviewed.      Lines/Drains/Airways     Peripheral Intravenous Line                 Midline Catheter Insertion/Assessment  - Single Lumen 01/21/20 1016 Right basilic vein (medial side of arm) 18g x 10cm 5 days                Significant Labs:    CBC/Anemia Profile:  Recent Labs   Lab 01/25/20  0308 01/26/20  0311   WBC  11.05 14.65*   HGB 8.9* 9.0*   HCT 29.0* 29.0*    282   MCV 96 94   RDW 15.7* 15.6*        Chemistries:  Recent Labs   Lab 01/25/20 0308 01/26/20  0311   * 132*   K 5.1 4.5   CL 99 102   CO2 17* 19*   BUN 32* 27*   CREATININE 4.3* 3.7*   CALCIUM 9.0 9.2   ALBUMIN 2.3* 2.3*   PROT 7.6 7.9   BILITOT 1.0 1.0   ALKPHOS 327* 290*   * 109*   AST 49* 38       ABGs:   No results for input(s): PH, PCO2, HCO3, POCSATURATED, BE in the last 48 hours.  Coagulation:   Recent Labs   Lab 01/26/20 0311   INR 1.2     Lactic Acid: No results for input(s): LACTATE in the last 48 hours.    Significant Imaging:  I have reviewed all pertinent imaging results/findings within the past 24 hours.

## 2020-01-26 NOTE — PROGRESS NOTES
"VASCULAR SURGERY SERVICE  Daily Progress Note    Assessment/Plan  54 y.o. female with with anuric ROSLYN s/p CABG. Currently dialyzing through Trialysis line.   - Pull Trialysis line today  - Will arrange for PermCath tomorrow in cath lab as schedule allows  - NPO after midnight    Subjective  No complaints.  Cardiothoracic service plans discharge patient tomorrow or Tuesday.  Explained rationale of PermCath.  Explained risks, benefits, alternatives.    Objective  Vital Signs:  BP (!) 116/57 (BP Location: Left arm, Patient Position: Lying)   Pulse 71   Temp 98.2 °F (36.8 °C) (Oral)   Resp (!) 25   Ht 5' 4" (1.626 m)   Wt 103.9 kg (229 lb 0.9 oz)   LMP 09/30/2015 (Exact Date)   SpO2 100%   Breastfeeding? No   BMI 39.32 kg/m²     Intake / Output:    Intake/Output Summary (Last 24 hours) at 1/26/2020 1110  Last data filed at 1/26/2020 0315  Gross per 24 hour   Intake 630 ml   Output 2500 ml   Net -1870 ml     Physical Exam:  General:  No distress  Vascular:  Trialysis in left IJ.  Bandage over right IJ.    Diagnostic Data:  Recent Labs     01/24/20 0307 01/25/20  0308 01/26/20  0311   WBC 11.40 11.05 14.65*   HGB 9.1* 8.9* 9.0*   HCT 29.4* 29.0* 29.0*    244 282        Recent Labs     01/24/20 0307 01/25/20  0308 01/26/20  0311   * 128* 132*   K 4.6 5.1 4.5    99 102   CO2 18* 17* 19*   BUN 21* 32* 27*   CREATININE 3.3* 4.3* 3.7*   CALCIUM 9.0 9.0 9.2   ANIONGAP 11 12 11   ESTGFRAFRICA 17.5* 12.7* 15.3*   EGFRNONAA 15.2* 11.1* 13.3*       Recent Labs     01/24/20 0307 01/25/20 0308 01/26/20  0311   * 131* 109*   AST 54* 49* 38   ALKPHOS 331* 327* 290*   BILITOT 1.3* 1.0 1.0       Layo Hernandez MD PGY6  Vascular and Endovascular Surgery Fellow  01/26/2020    "

## 2020-01-26 NOTE — PLAN OF CARE
Pt experienced an unwitnessed fall today; no injuries noted.  Pt denies any c/o SOB, CP, or discomfort.  Generalized skin remains CDI; 2+ pitting edema noted to BLEs.  TEDs ordered.  Incisions remain MACKENZIE, CDI.  Sternal precautions reinforced repeatedly.  Heart pillow provided.  Fall risk precautions reinforced.  Pt being dialyzed as scheduled.  Plan for Permacath placement tomorrow. Plan to continue with post-op care.  Mother at the bedside.  Pt and family tolerating plan of care.

## 2020-01-26 NOTE — PROGRESS NOTES
VSS, pt now making urine, no BM overnight. SpO2 > 90% maintained on 2L NC. HD tolerated well. No acute events overnight. Plan of care reviewed with pt and spouse, verbalized understanding, reinforcement needed.

## 2020-01-26 NOTE — PROGRESS NOTES
Pt experienced an unwitnessed fall while in the bathroom. Pt was assisted to the bathroom by RN and required additional time to try to void.  Pt was instructed to utilize call light when ready to be assisted back to bed.  Non-slip socks were in place and call string was within reach at time of fall.  Pt verbalized understanding, but attempted to stand on her own without calling.  Per pt, she became weak after standing, and gripped the bar to assist herself down to the floor.  Upon assessment, pt was sitting on the floor.  She denies hitting her head or having any pain.  Pt assisted back to bed via nursing staff.  VSS.  Incisions remain intact.  Bed alarm set and purewick placed for future voiding.  Dr. Parra notified of fall.  Fall risk precautions reinforced.  Call bell within reach.  Bed in lowest position with siderails up x2.  Pt instructed to call for assistance. Will continue to monitor.

## 2020-01-26 NOTE — ASSESSMENT & PLAN NOTE
Neuro:   - multimodal pain regimen  - restarted home lexapro  - Has been off precedex  - orientation/delirium improves with quality sleep     Pulmonary:   - Extubated 1/21  - CXR daily  - ABG prn  - Pulmonary toilet  - IS  - wean NC     Cardiac:  - Epi off  - Dobutamine off 1/24  - PO metoprolol and amio   - Echo 1/17:            Mildly decreased left ventricular systolic function. The estimated ejection fraction is 50%.  · Concentric left ventricular remodeling.  · Local segmental wall motion abnormalities.  · Septal wall has abnormal motion.  · Grade I (mild) left ventricular diastolic dysfunction consistent with impaired relaxation.  · Normal right ventricular systolic function.  · Severe tricuspid regurgitation.   -Pacer turned off  - d/c'd V wires  - d/c A line    Renal:   - CRRT discontinued 1/21  - Successful HD trial 1/23, 1/25  - cont hyponatremia and elevated Cr, no HD today  - PermaCath tomorrow with vascular sx, OK for plavix and heparin  - d/c LIJ trialysis    Infectious Disease:   - Afebrile, WBC increased from day prior  - Antibiotics: Perioperative, now complete     Hematology/Oncology:  - monitor H/H, today stable     Endocrine:  - Endocrine consulted, keep blood glucose goals 140-180     Fluids/Electrolytes/Nutrition/GI:   - D5 NS 20 KCl @ 5  - Trend CMP  - LFTs cont to trend down  - Replace Lytes PRN   - SLP following, cleared for pureed diet  - NPO at midnight for permacath tomorrow  - Scopolamine patch, PRN Zofran     Prophylaxis:  - GI: protonix daily     Dispo:  Stepdown to [facility]

## 2020-01-27 PROBLEM — D62 ACUTE BLOOD LOSS ANEMIA: Status: ACTIVE | Noted: 2020-01-27

## 2020-01-27 PROBLEM — Z95.1 S/P CABG X 1: Status: ACTIVE | Noted: 2020-01-27

## 2020-01-27 LAB
ALBUMIN SERPL BCP-MCNC: 2.4 G/DL (ref 3.5–5.2)
ALP SERPL-CCNC: 265 U/L (ref 55–135)
ALT SERPL W/O P-5'-P-CCNC: 87 U/L (ref 10–44)
ANION GAP SERPL CALC-SCNC: 12 MMOL/L (ref 8–16)
AST SERPL-CCNC: 31 U/L (ref 10–40)
BASOPHILS # BLD AUTO: 0.06 K/UL (ref 0–0.2)
BASOPHILS NFR BLD: 0.5 % (ref 0–1.9)
BILIRUB SERPL-MCNC: 0.9 MG/DL (ref 0.1–1)
BUN SERPL-MCNC: 39 MG/DL (ref 6–20)
CALCIUM SERPL-MCNC: 9.5 MG/DL (ref 8.7–10.5)
CHLORIDE SERPL-SCNC: 98 MMOL/L (ref 95–110)
CO2 SERPL-SCNC: 18 MMOL/L (ref 23–29)
CREAT SERPL-MCNC: 4.4 MG/DL (ref 0.5–1.4)
DIFFERENTIAL METHOD: ABNORMAL
EOSINOPHIL # BLD AUTO: 0.1 K/UL (ref 0–0.5)
EOSINOPHIL NFR BLD: 0.7 % (ref 0–8)
ERYTHROCYTE [DISTWIDTH] IN BLOOD BY AUTOMATED COUNT: 15.7 % (ref 11.5–14.5)
EST. GFR  (AFRICAN AMERICAN): 12.4 ML/MIN/1.73 M^2
EST. GFR  (NON AFRICAN AMERICAN): 10.8 ML/MIN/1.73 M^2
GLUCOSE SERPL-MCNC: 152 MG/DL (ref 70–110)
HCT VFR BLD AUTO: 29.9 % (ref 37–48.5)
HGB BLD-MCNC: 9 G/DL (ref 12–16)
IMM GRANULOCYTES # BLD AUTO: 0.07 K/UL (ref 0–0.04)
IMM GRANULOCYTES NFR BLD AUTO: 0.5 % (ref 0–0.5)
INR PPP: 1.3 (ref 0.8–1.2)
LYMPHOCYTES # BLD AUTO: 1.3 K/UL (ref 1–4.8)
LYMPHOCYTES NFR BLD: 9.9 % (ref 18–48)
MAGNESIUM SERPL-MCNC: 2.2 MG/DL (ref 1.6–2.6)
MCH RBC QN AUTO: 28.8 PG (ref 27–31)
MCHC RBC AUTO-ENTMCNC: 30.1 G/DL (ref 32–36)
MCV RBC AUTO: 96 FL (ref 82–98)
MONOCYTES # BLD AUTO: 1.9 K/UL (ref 0.3–1)
MONOCYTES NFR BLD: 14.6 % (ref 4–15)
NEUTROPHILS # BLD AUTO: 9.6 K/UL (ref 1.8–7.7)
NEUTROPHILS NFR BLD: 73.8 % (ref 38–73)
NRBC BLD-RTO: 0 /100 WBC
PHOSPHATE SERPL-MCNC: 5.2 MG/DL (ref 2.7–4.5)
PLATELET # BLD AUTO: 365 K/UL (ref 150–350)
PMV BLD AUTO: 10.9 FL (ref 9.2–12.9)
POCT GLUCOSE: 133 MG/DL (ref 70–110)
POCT GLUCOSE: 147 MG/DL (ref 70–110)
POCT GLUCOSE: 175 MG/DL (ref 70–110)
POTASSIUM SERPL-SCNC: 4.8 MMOL/L (ref 3.5–5.1)
PROT SERPL-MCNC: 8.1 G/DL (ref 6–8.4)
PROTHROMBIN TIME: 13.4 SEC (ref 9–12.5)
RBC # BLD AUTO: 3.12 M/UL (ref 4–5.4)
SODIUM SERPL-SCNC: 128 MMOL/L (ref 136–145)
WBC # BLD AUTO: 12.98 K/UL (ref 3.9–12.7)

## 2020-01-27 PROCEDURE — 25000003 PHARM REV CODE 250: Performed by: STUDENT IN AN ORGANIZED HEALTH CARE EDUCATION/TRAINING PROGRAM

## 2020-01-27 PROCEDURE — 36558 INSERT TUNNELED CV CATH: CPT | Mod: LT,,, | Performed by: SURGERY

## 2020-01-27 PROCEDURE — C1894 INTRO/SHEATH, NON-LASER: HCPCS | Performed by: SURGERY

## 2020-01-27 PROCEDURE — 63600175 PHARM REV CODE 636 W HCPCS: Performed by: STUDENT IN AN ORGANIZED HEALTH CARE EDUCATION/TRAINING PROGRAM

## 2020-01-27 PROCEDURE — 77001 FLUOROGUIDE FOR VEIN DEVICE: CPT | Performed by: SURGERY

## 2020-01-27 PROCEDURE — 93005 ELECTROCARDIOGRAM TRACING: CPT

## 2020-01-27 PROCEDURE — 97116 GAIT TRAINING THERAPY: CPT

## 2020-01-27 PROCEDURE — 84100 ASSAY OF PHOSPHORUS: CPT

## 2020-01-27 PROCEDURE — 99900035 HC TECH TIME PER 15 MIN (STAT)

## 2020-01-27 PROCEDURE — 93010 ELECTROCARDIOGRAM REPORT: CPT | Mod: 76,,, | Performed by: INTERNAL MEDICINE

## 2020-01-27 PROCEDURE — 94761 N-INVAS EAR/PLS OXIMETRY MLT: CPT

## 2020-01-27 PROCEDURE — 77001 CHG FLUOROGUIDE CNTRL VEN ACCESS,PLACE,REPLACE,REMOVE: ICD-10-PCS | Mod: 26,,, | Performed by: SURGERY

## 2020-01-27 PROCEDURE — 99153 MOD SED SAME PHYS/QHP EA: CPT | Performed by: SURGERY

## 2020-01-27 PROCEDURE — 93010 EKG 12-LEAD: ICD-10-PCS | Mod: 76,,, | Performed by: INTERNAL MEDICINE

## 2020-01-27 PROCEDURE — 77001 FLUOROGUIDE FOR VEIN DEVICE: CPT | Mod: 26,,, | Performed by: SURGERY

## 2020-01-27 PROCEDURE — 97535 SELF CARE MNGMENT TRAINING: CPT

## 2020-01-27 PROCEDURE — 36415 COLL VENOUS BLD VENIPUNCTURE: CPT

## 2020-01-27 PROCEDURE — 63600175 PHARM REV CODE 636 W HCPCS: Performed by: SURGERY

## 2020-01-27 PROCEDURE — 27000221 HC OXYGEN, UP TO 24 HOURS

## 2020-01-27 PROCEDURE — 36558 PR INSERT TUNNELED CV CATH W/O PORT OR PUMP: ICD-10-PCS | Mod: LT,,, | Performed by: SURGERY

## 2020-01-27 PROCEDURE — 94664 DEMO&/EVAL PT USE INHALER: CPT

## 2020-01-27 PROCEDURE — 99499 NO LOS: ICD-10-PCS | Mod: ,,, | Performed by: SURGERY

## 2020-01-27 PROCEDURE — C1750 CATH, HEMODIALYSIS,LONG-TERM: HCPCS | Performed by: SURGERY

## 2020-01-27 PROCEDURE — 97530 THERAPEUTIC ACTIVITIES: CPT

## 2020-01-27 PROCEDURE — 99152 MOD SED SAME PHYS/QHP 5/>YRS: CPT | Performed by: SURGERY

## 2020-01-27 PROCEDURE — 36558 INSERT TUNNELED CV CATH: CPT | Performed by: SURGERY

## 2020-01-27 PROCEDURE — 92526 ORAL FUNCTION THERAPY: CPT

## 2020-01-27 PROCEDURE — 25000003 PHARM REV CODE 250: Performed by: SURGERY

## 2020-01-27 PROCEDURE — 83735 ASSAY OF MAGNESIUM: CPT

## 2020-01-27 PROCEDURE — 80053 COMPREHEN METABOLIC PANEL: CPT

## 2020-01-27 PROCEDURE — 85025 COMPLETE CBC W/AUTO DIFF WBC: CPT

## 2020-01-27 PROCEDURE — 20600001 HC STEP DOWN PRIVATE ROOM

## 2020-01-27 PROCEDURE — 99499 UNLISTED E&M SERVICE: CPT | Mod: ,,, | Performed by: SURGERY

## 2020-01-27 PROCEDURE — C1769 GUIDE WIRE: HCPCS | Performed by: SURGERY

## 2020-01-27 PROCEDURE — 85610 PROTHROMBIN TIME: CPT

## 2020-01-27 PROCEDURE — 27000646 HC AEROBIKA DEVICE

## 2020-01-27 PROCEDURE — 93010 ELECTROCARDIOGRAM REPORT: CPT | Mod: ,,, | Performed by: INTERNAL MEDICINE

## 2020-01-27 DEVICE — GLIDEPATH HEMODIALYSIS CATH, ST, DL, 14.5 FR. 23CM
Type: IMPLANTABLE DEVICE | Site: NECK | Status: FUNCTIONAL
Brand: GLIDEPATH LONG-TERM HEMODIALYSIS CATHETER WITH PRELOADED STYLET

## 2020-01-27 RX ORDER — METOPROLOL TARTRATE 1 MG/ML
5 INJECTION, SOLUTION INTRAVENOUS ONCE
Status: COMPLETED | OUTPATIENT
Start: 2020-01-27 | End: 2020-01-27

## 2020-01-27 RX ORDER — LIDOCAINE HYDROCHLORIDE 20 MG/ML
INJECTION, SOLUTION INFILTRATION; PERINEURAL
Status: DISCONTINUED | OUTPATIENT
Start: 2020-01-27 | End: 2020-01-27 | Stop reason: HOSPADM

## 2020-01-27 RX ORDER — AMIODARONE HYDROCHLORIDE 200 MG/1
400 TABLET ORAL ONCE
Status: COMPLETED | OUTPATIENT
Start: 2020-01-27 | End: 2020-01-27

## 2020-01-27 RX ORDER — HEPARIN SODIUM 1000 [USP'U]/ML
INJECTION, SOLUTION INTRAVENOUS; SUBCUTANEOUS
Status: DISCONTINUED | OUTPATIENT
Start: 2020-01-27 | End: 2020-01-27 | Stop reason: HOSPADM

## 2020-01-27 RX ORDER — MIDAZOLAM HYDROCHLORIDE 1 MG/ML
INJECTION, SOLUTION INTRAMUSCULAR; INTRAVENOUS
Status: DISCONTINUED | OUTPATIENT
Start: 2020-01-27 | End: 2020-01-27 | Stop reason: HOSPADM

## 2020-01-27 RX ORDER — SODIUM CHLORIDE 9 MG/ML
INJECTION, SOLUTION INTRAVENOUS ONCE
Status: COMPLETED | OUTPATIENT
Start: 2020-01-28 | End: 2020-01-28

## 2020-01-27 RX ORDER — FENTANYL CITRATE 50 UG/ML
INJECTION, SOLUTION INTRAMUSCULAR; INTRAVENOUS
Status: DISCONTINUED | OUTPATIENT
Start: 2020-01-27 | End: 2020-01-27 | Stop reason: HOSPADM

## 2020-01-27 RX ORDER — HEPARIN SODIUM 200 [USP'U]/100ML
INJECTION, SOLUTION INTRAVENOUS
Status: DISCONTINUED | OUTPATIENT
Start: 2020-01-27 | End: 2020-02-04

## 2020-01-27 RX ORDER — AMIODARONE HYDROCHLORIDE 200 MG/1
400 TABLET ORAL DAILY
Status: DISCONTINUED | OUTPATIENT
Start: 2020-01-28 | End: 2020-01-28

## 2020-01-27 RX ADMIN — AMIODARONE HYDROCHLORIDE 400 MG: 200 TABLET ORAL at 08:01

## 2020-01-27 RX ADMIN — AMIODARONE HYDROCHLORIDE 200 MG: 200 TABLET ORAL at 09:01

## 2020-01-27 RX ADMIN — METOPROLOL TARTRATE 5 MG: 5 INJECTION INTRAVENOUS at 06:01

## 2020-01-27 RX ADMIN — SCOPALAMINE 1 PATCH: 1 PATCH, EXTENDED RELEASE TRANSDERMAL at 06:01

## 2020-01-27 RX ADMIN — SCOPALAMINE 1 PATCH: 1 PATCH, EXTENDED RELEASE TRANSDERMAL at 11:01

## 2020-01-27 RX ADMIN — PANTOPRAZOLE SODIUM 40 MG: 40 TABLET, DELAYED RELEASE ORAL at 09:01

## 2020-01-27 RX ADMIN — HEPARIN SODIUM 5000 UNITS: 5000 INJECTION, SOLUTION INTRAVENOUS; SUBCUTANEOUS at 06:01

## 2020-01-27 RX ADMIN — HEPARIN SODIUM 5000 UNITS: 5000 INJECTION, SOLUTION INTRAVENOUS; SUBCUTANEOUS at 02:01

## 2020-01-27 RX ADMIN — HEPARIN SODIUM 5000 UNITS: 5000 INJECTION, SOLUTION INTRAVENOUS; SUBCUTANEOUS at 09:01

## 2020-01-27 RX ADMIN — PANTOPRAZOLE SODIUM 40 MG: 40 TABLET, DELAYED RELEASE ORAL at 08:01

## 2020-01-27 RX ADMIN — ASPIRIN 81 MG CHEWABLE TABLET 81 MG: 81 TABLET CHEWABLE at 09:01

## 2020-01-27 RX ADMIN — ESCITALOPRAM 10 MG: 5 TABLET, FILM COATED ORAL at 09:01

## 2020-01-27 RX ADMIN — CLOPIDOGREL BISULFATE 75 MG: 75 TABLET, FILM COATED ORAL at 09:01

## 2020-01-27 RX ADMIN — POLYETHYLENE GLYCOL 3350 17 G: 17 POWDER, FOR SOLUTION ORAL at 09:01

## 2020-01-27 RX ADMIN — METOPROLOL TARTRATE 5 MG: 5 INJECTION INTRAVENOUS at 10:01

## 2020-01-27 RX ADMIN — ATORVASTATIN CALCIUM 80 MG: 20 TABLET, FILM COATED ORAL at 08:01

## 2020-01-27 RX ADMIN — METOPROLOL SUCCINATE 50 MG: 50 TABLET, EXTENDED RELEASE ORAL at 09:01

## 2020-01-27 NOTE — PLAN OF CARE
Discharge Recommendation: Rehab.    0 goals met today. PT goals appropriate.    Courtney Meier PT, DPT  2020  Pager: [phone]    Problem: Physical Therapy Goal  Goal: Physical Therapy Goal  Description  Goals to be met by: 20    Patient will increase functional independence with mobility by performin. Supine to sit with Contact Guard Assistance -not met  2. Sit to stand transfer with Contact Guard Assistance -not met  3. Gait  x 150 feet with Contact Guard Assistance -not met  4. Ascend/descend 5 stair with left Handrails Contact Guard Assistance -not met     Outcome: Ongoing, Progressing

## 2020-01-27 NOTE — PT/OT/SLP PROGRESS
Physical Therapy Treatment    Patient Name:  Suyapa Connelly   MRN:  [id number]  Admitting Diagnosis:  Acute coronary syndrome   Recent Surgery: Procedure(s) (LRB):  Insertion, Catheter, Central Venous, Hemodialysis (N/A) Day of Surgery  Admit Date: 1/2/2020  Length of Stay: 25 days    Recommendations:     Discharge Recommendations:  rehabilitation facility   Discharge Equipment Recommendations: other (see comments)(tbd pending progress)   Barriers to discharge: None    Assessment:     Suyapa Connelly is a 54 y.o. female admitted with a medical diagnosis of Acute coronary syndrome.  She presents with the following impairments/functional limitations:  weakness, impaired endurance, impaired self care skills, impaired functional mobilty, gait instability, impaired balance, decreased upper extremity function, decreased lower extremity function, impaired cardiopulmonary response to activity, decreased safety awareness, impaired skin. Pt tolerated session fair today. Pt expressing frustration with length of hospital stay as well as with some interactions during time in hospital. Time provided for pt to express her frustrations and therapeutic listening and counseling within PT scope provided. Pt agreeable to PT session and expresses desire to improve overall function but seems to present with self-limiting behavior, limiting progression of mobility to inside room activities only at this time. Pt continues to require reinforcement of sternal precautions and does not listening to cueing for safety throughout session. Pt is an excellent candidate for inpatient rehabilitation, as pt has experienced decline from PLOF, has good family support, and is motivated to participate in therapy.     Rehab Prognosis: Fair; patient would benefit from acute skilled PT services to address these deficits and reach maximum level of function.    Recent Surgery: Procedure(s) (LRB):  Insertion, Catheter, Central Venous, Hemodialysis (N/A) Day of  "Surgery    Plan:     During this hospitalization, patient to be seen 5 x/week to address the identified rehab impairments via gait training, therapeutic activities, therapeutic exercises, neuromuscular re-education and progress toward the following goals:    · Plan of Care Expires:  02/22/20    Subjective     RN notified prior to session. No family/friends present upon PT entrance into room.    Chief Complaint: Pt expressed that she did not like individuals "pushing her" as they "dont know what it's like to have heart surgery or a heart attack"  Patient/Family Comments/goals: get better  Pain/Comfort:  · Pain Rating 1: 0/10  · Pain Rating Post-Intervention 1: 0/10      Objective:     Additional staff present: OT for co-treatment due to pt's poor tolerance to activity and extremely limited cardiopulmonary reserve     Patient found up in chair with: telemetry, oxygen   Mental Status: Patient is oriented to AxOx4 and follows multi-step commands. Patient is Alert and Cooperative during session.    General Precautions: Standard, Cardiac sternal   Orthopedic Precautions:N/A   Braces: N/A   Body mass index is 37.65 kg/m².  Oxygen Device: Nasal Cannula 2L    Outcome Measures:  AM-PAC 6 CLICK MOBILITY  Turning over in bed (including adjusting bedclothes, sheets and blankets)?: 2  Sitting down on and standing up from a chair with arms (e.g., wheelchair, bedside commode, etc.): 2  Moving from lying on back to sitting on the side of the bed?: 2  Moving to and from a bed to a chair (including a wheelchair)?: 2  Need to walk in hospital room?: 3  Climbing 3-5 steps with a railing?: 1  Basic Mobility Total Score: 12     Functional Mobility:    Bed Mobility:   · Pt found/returned to bedside chair    Transfers:   · Sit <> Stand Transfer: minimum assistance and of 2 persons with hand-held assist   · Stand <> Sit Transfer: maximal assistance with hand-held assist   · [id number] from bedside chair   · Pt req'd Min A to scoot forward in " chair to perform transfer  · Pt req'd vc's to maintain sternal precautions while performing transfer  · Pt req'd Max A to perform stand > sit transfer into bedside chair due to complaints of fatigue. Pt began reaching forward for chair and req'd cueing from PT for safety as well as assist 2/2 fatigue and safety concerns  · Toilet Transfer: Step Transfer technique with minimum assistance with hand-held assist   · Sit <> Stand Transfer: minimum assistance and of 2 persons with hand-held assist   · Stand <> Sit Transfer: maximal assistance with hand-held assist   · [id number] from toliet  · Max A to perform stand > sit transfer onto toilet due to pt fatigue requiring assist to safely lower onto toilet    Standing Balance:   Assistance Level Required: Minimal Assistance   Patient used: no assistive device    Comments: Pt req'd Min A for balance while performing hand hygiene at sink and while performing rico-care after toileting. Pt with safety concerns including reaching far outside MARIO for sink/toilet paper without appropriate hip strategy to balance. Pt provided max vc's to not break sternal precautions but pt did not comply and pushed through elbow to assist with maintaining standing balance.      Gait:  · Patient ambulated: 14 ft; 8 ft (toileting between)   · Patient required: minimal assist  · Patient used:  hand-held assist   · Gait Pattern observed: reciprocal gait  · Gait Deviation(s): unsteady gait, decreased step length, wide base of support, decreased weight shift, decreased arm swing, shuffle gait, flexed posture and decreased nirav  · Impairments due to: decreased strength and decreased endurance  · all lines remained intact throughout ambulation trial  · Comments: Pt generally unsteady requiring Min A to remain upright. Pt with increased trunk flexion as fatigue increased as well as poor foot clearance. Cueing provided to maintain good gait quality even when fatigued to prevent falls but pt did not  comply.    Education:   Time provided for education, counseling and discussion of health disposition in regards to patient's current status   All questions answered within PT scope of practice and to patient's satisfaction   PT role in POC to address current functional deficits   Pt educated on proper body mechanics, safety techniques, and energy conservation with PT facilitation and cueing throughout session   Call nursing/pct to transfer to chair/use bathroom. Pt stated understanding.   Whiteboard updated with pt's current mobility status documented above   Safe to perform step transfer to/from chair/bedside commode min A and HHA x 2 w/ nursing/PCT present   Importance of OOB tolerance 8-10 hrs/day to improve lung ventilation and expansion as well as strengthen postural musculature    Pt expressed dissatisfaction with not being allowed to return to bed    Patient left up in chair with all lines intact, call button in reach and RN notified.    GOALS:   Multidisciplinary Problems     Physical Therapy Goals        Problem: Physical Therapy Goal    Goal Priority Disciplines Outcome Goal Variances Interventions   Physical Therapy Goal     PT, PT/OT Ongoing, Progressing     Description:  Goals to be met by: 20    Patient will increase functional independence with mobility by performin. Supine to sit with Contact Guard Assistance -not met  2. Sit to stand transfer with Contact Guard Assistance -not met  3. Gait  x 150 feet with Contact Guard Assistance -not met  4. Ascend/descend 5 stair with left Handrails Contact Guard Assistance -not met                Multidisciplinary Problems (Resolved)        Problem: Physical Therapy Goal    Goal Priority Disciplines Outcome Goal Variances Interventions   Physical Therapy Goal   (Resolved)     PT, PT/OT Met     Description:  Eval only                  Time Tracking:     PT Received On: 20  PT Start Time: 954     PT Stop Time: 2  PT Total Time (min):  38 min       Billable Minutes:   · Gait Training 15 and Therapeutic Activity 23    Treatment Type: Treatment  PT/PTA: PT       Courtney Meier PT, DPT  1/27/2020  Pager: [phone]

## 2020-01-27 NOTE — BRIEF OP NOTE
[facility]  Brief Operative Note    SUMMARY     Surgery Date: 1/27/2020     Surgeon(s) and Role:     * ESTEBAN Gomez III, MD - Primary    Assisting Surgeon: LAZARA Hamilton    Pre-op Diagnosis:  Injury of right kidney, initial encounter [S37.001A]    Post-op Diagnosis:  Post-Op Diagnosis Codes:     * Injury of right kidney, initial encounter [S37.001A]    PROCEDURES:    1. Tunneled catheter placement (L IJ 23 cm 14.5 fr Glidepath)  2. US guided L IJ cannulation  3. Conscious sedation    Anesthesia: RN directed sedation and local    Description of Procedure: Successful placement, may use    Description of the findings of the procedure:Afib vs SVT during insertion, ,  BP stable.    Estimated Blood Loss: 10cc         Specimens:   Specimen (12h ago, onward)    None

## 2020-01-27 NOTE — ASSESSMENT & PLAN NOTE
- CRRT discontinued 1/21  - Successful HD trial 1/23, 1/25  - cont hyponatremia and elevated Cr  - PermaCath today with vascular sx, OK for plavix and heparin  - d/c LIJ trialysis

## 2020-01-27 NOTE — PROGRESS NOTES
Complete assessment done. VSS. NAD. Instructed to call for any assistance and not to get OOB for any reason without assistance. avasys camera in room and on. Instructed on sternal precautions but pt will push on bed right after reminded. Midsternal chest incision open to air/dermabond. Slight pink noted in middle of incision. Monitor showing SR. Rt posterior thigh blister noted

## 2020-01-27 NOTE — PLAN OF CARE
01/27/20 1236   Discharge Reassessment   Assessment Type Discharge Planning Reassessment   Provided patient/caregiver education on the expected discharge date and the discharge plan Yes   Do you have any problems affording any of your prescribed medications? No   Discharge Plan A Rehab   Discharge Plan B Rehab   DME Needed Upon Discharge  none   Anticipated Discharge Disposition Rehab     Pt stepdown from ICU. S/p CABG POD 13. CM to bedside for d/c planning. Updated pt on plan of care which is d/c to rehab. Pt is in agreement with plan for rehab. No questions at this time. Pt able to state back sternal precautions to CM.    White board updated.     Julie Haase RN  Case Management [phone]

## 2020-01-27 NOTE — SUBJECTIVE & OBJECTIVE
Medications:  Continuous Infusions:  Scheduled Meds:   sodium chloride 0.9%   Intravenous Once    amiodarone  200 mg Oral Daily    aspirin  81 mg Oral Daily    atorvastatin  80 mg Oral QHS    clopidogrel  75 mg Oral Daily    escitalopram oxalate  10 mg Oral Daily    heparin (porcine)  5,000 Units Subcutaneous Q8H    metoprolol succinate  50 mg Oral Daily    pantoprazole  40 mg Oral BID    polyethylene glycol  17 g Oral Daily    scopolamine  1 patch Transdermal Q3 Days     PRN Meds:acetaminophen, albuterol-ipratropium, bisacodyl, Dextrose 10% Bolus, Dextrose 10% Bolus, glucagon (human recombinant), glucose, glucose, HYDROmorphone, insulin aspart U-100, metoclopramide HCl, metoprolol, ondansetron, oxyCODONE, oxyCODONE     Objective:     Vital Signs (Most Recent):  Temp: 98.3 °F (36.8 °C) (01/27/20 0412)  Pulse: 68 (01/27/20 0412)  Resp: 20 (01/26/20 2307)  BP: (!) 150/69 (01/27/20 0412)  SpO2: 95 % (01/27/20 0430) Vital Signs (24h Range):  Temp:  [97.7 °F (36.5 °C)-98.4 °F (36.9 °C)] 98.3 °F (36.8 °C)  Pulse:  [64-74] 68  Resp:  [16-25] 20  SpO2:  [88 %-100 %] 95 %  BP: (121-150)/(60-69) 150/69  Arterial Line BP: (112-125)/(50-54) 125/53         Physical Exam   Constitutional: She appears well-developed and well-nourished.   Neck:   Trialysis removed   Cardiovascular: Normal rate and regular rhythm.   Pulmonary/Chest: Effort normal. No respiratory distress.   Abdominal: Soft. She exhibits no distension.   Neurological: She is alert.   Skin: Skin is warm and dry.   Nursing note and vitals reviewed.      Significant Labs:  CBC:   Recent Labs   Lab 01/26/20 0311   WBC 14.65*   RBC 3.08*   HGB 9.0*   HCT 29.0*      MCV 94   MCH 29.2   MCHC 31.0*     CMP:   Recent Labs   Lab 01/26/20 0311   *   CALCIUM 9.2   ALBUMIN 2.3*   PROT 7.9   *   K 4.5   CO2 19*      BUN 27*   CREATININE 3.7*   ALKPHOS 290*   *   AST 38   BILITOT 1.0       Significant Diagnostics:  I have reviewed all  pertinent imaging results/findings within the past 24 hours.

## 2020-01-27 NOTE — PROGRESS NOTES
Pt transferred from room 3065 to room 3092 via pt's own bed to be closer to main nurses station. Nofified Telemetry and box changed out. Notified YEVVO tech.

## 2020-01-27 NOTE — PT/OT/SLP PROGRESS
Occupational Therapy   Treatment    Name: Suyapa Connelly  MRN: [id number]  Admitting Diagnosis:  Acute coronary syndrome  13 Days Post-Op   CORONARY ARTERY BYPASS GRAFT (CABG) x 1     Off Pump (N/A)    Recommendations:     Discharge Recommendations: rehabilitation facility  Discharge Equipment Recommendations:  (TBD)  Barriers to discharge:  None    Assessment:     Suyapa Connelly is a 54 y.o. female with a medical diagnosis of Acute coronary syndrome.  She presents with frustration this day and therapeutic listening was provided. Patient agreeable to participate with therapy but exhibits self-limiting behavior and requires reinforcement of sternal precautions throughout session. Performance deficits affecting function are weakness, impaired endurance, impaired self care skills, impaired functional mobilty, gait instability, impaired balance, decreased upper extremity function, decreased safety awareness, impaired skin, impaired cardiopulmonary response to activity, orthopedic precautions.     Rehab Prognosis:  Good; patient would benefit from acute skilled OT services to address these deficits and reach maximum level of function.       Plan:     Patient to be seen 5 x/week to address the above listed problems via self-care/home management, therapeutic activities, therapeutic exercises  · Plan of Care Expires: 02/21/20  · Plan of Care Reviewed with: patient    Subjective     Pain/Comfort:  · Pain Rating 1: other (see comments)(Did not rate)    Objective:     Communicated with: RN prior to session.  Patient found up in chair with oxygen, telemetry(hep lock IV) upon OT entry to room.    General Precautions: Standard, fall, sternal   Orthopedic Precautions:N/A   Braces: N/A     Occupational Performance:     Bed Mobility:    · Did not observe    Functional Mobility/Transfers:  · Patient completed scooting in bedside chair anteriorly in preparation for stand with min assist 2' adherence to sternal precautions  · Patient  completed Sit > Stand Transfer from bedside chair with minimum assist x2 with hand-held assist   · Patient completed Toilet Transfer onto toilet in restroom via Step Transfer technique with minimum assistance with hand-held assist  · Patient completed Stand > Sit Transfer onto bedside chair with max assist 2' patient reporting fatigue, requiring max verbal cueing for safety with descent onto chair  · Functional Mobility: Patient completed functional mobility ~22' with min assist/hand-held assist x2 and no AD.    Activities of Daily Living:  · Grooming: minimum assistance Patient participated in hand hygiene while standing at the sink in the restroom with min assist for standing balance.  · Toileting: minimum assistance Patient completed toileting on the toilet in the restroom with min assist for standing balance during rico-care.      [facility] 6 Click ADL: 15    Treatment & Education:  Role of OT/POC  Reviewed sternal precautions - needs reinforcing  Call button for assistance    Patient left up in chair with all lines intact, call button in reach and RN notifiedEducation:      GOALS:   Multidisciplinary Problems     Occupational Therapy Goals        Problem: Occupational Therapy Goal    Goal Priority Disciplines Outcome Interventions   Occupational Therapy Goal     OT, PT/OT Ongoing, Progressing    Description:  Goals to be met by: 2/1/20     Patient will increase functional independence with ADLs by performing:    Feeding with [place].  UE Dressing with Minimal Assistance.  LE Dressing with Moderate Assistance.  Grooming while standing at sink with Contact Guard Assistance.  Toileting from toilet with Moderate Assistance for hygiene and clothing management. -updated 1/27/2020  Toilet transfer to toilet with Minimal Assistance. -updated 1/27/2020                       Time Tracking:     OT Date of Treatment: 01/27/20  OT Start Time: 0954  OT Stop Time: 1032  OT Total Time (min): 38 min    Billable Minutes:Self  Care/Home Management 23 minutes  Therapeutic Activity 15 minutes    Vivian Morales OT  1/27/2020

## 2020-01-27 NOTE — PLAN OF CARE
Problem: Occupational Therapy Goal  Goal: Occupational Therapy Goal  Description  Goals to be met by: 2/1/20     Patient will increase functional independence with ADLs by performing:    Feeding with [place].  UE Dressing with Minimal Assistance.  LE Dressing with Moderate Assistance.  Grooming while standing at sink with Contact Guard Assistance.  Toileting from toilet with Moderate Assistance for hygiene and clothing management. -updated 1/27/2020  Toilet transfer to toilet with Minimal Assistance. -updated 1/27/2020      Outcome: Ongoing, Progressing    Continue with POC.  Vivian Morales OT  1/27/2020

## 2020-01-27 NOTE — SUBJECTIVE & OBJECTIVE
Interval History: NAEON. Going for permacath today. Awaiting rehab placement.     Review of Systems   Cardiovascular: Positive for dyspnea on exertion and leg swelling. Negative for chest pain.   Respiratory: Negative for shortness of breath.    Hematologic/Lymphatic: Negative for bleeding problem.   Genitourinary: Negative for dysuria.     Medications:  Continuous Infusions:  Scheduled Meds:   sodium chloride 0.9%   Intravenous Once    amiodarone  200 mg Oral Daily    aspirin  81 mg Oral Daily    atorvastatin  80 mg Oral QHS    clopidogrel  75 mg Oral Daily    escitalopram oxalate  10 mg Oral Daily    heparin (porcine)  5,000 Units Subcutaneous Q8H    metoprolol succinate  50 mg Oral Daily    pantoprazole  40 mg Oral BID    polyethylene glycol  17 g Oral Daily    scopolamine  1 patch Transdermal Q3 Days     PRN Meds:acetaminophen, albuterol-ipratropium, bisacodyl, Dextrose 10% Bolus, Dextrose 10% Bolus, glucagon (human recombinant), glucose, glucose, HYDROmorphone, insulin aspart U-100, metoclopramide HCl, metoprolol, ondansetron, oxyCODONE, oxyCODONE     Objective:     Vital Signs (Most Recent):  Temp: 97.8 °F (36.6 °C) (01/27/20 0800)  Pulse: 68 (01/27/20 0842)  Resp: 19 (01/27/20 0842)  BP: 131/62 (01/27/20 0800)  SpO2: 97 % (01/27/20 0842) Vital Signs (24h Range):  Temp:  [97.7 °F (36.5 °C)-98.4 °F (36.9 °C)] 97.8 °F (36.6 °C)  Pulse:  [64-74] 68  Resp:  [16-20] 19  SpO2:  [88 %-100 %] 97 %  BP: (121-150)/(60-69) 131/62     Weight: 99.5 kg (219 lb 5.7 oz)  Body mass index is 37.65 kg/m².    SpO2: 97 %  O2 Device (Oxygen Therapy): nasal cannula    Intake/Output - Last 3 Shifts       01/25 0700 - 01/26 0659 01/26 0700 - 01/27 0659 01/27 0700 - 01/28 0659    P.O. 720      I.V. (mL/kg)       Other 150      Total Intake(mL/kg) 870 (8.4)      Urine (mL/kg/hr) 200 (0.1)      Other 2300      Total Output 2500      Net -1630             Stool Occurrence  0 x           Lines/Drains/Airways     Peripheral  Intravenous Line                 Midline Catheter Insertion/Assessment  - Single Lumen 01/21/20 1016 Right basilic vein (medial side of arm) 18g x 10cm 6 days                Physical Exam   Constitutional: She is oriented to person, place, and time. She appears well-developed and well-nourished. No distress.   HENT:   Head: Normocephalic and atraumatic.   Eyes: Pupils are equal, round, and reactive to light. EOM are normal.   Neck: Normal range of motion.   Cardiovascular: Normal rate, regular rhythm and normal pulses.   Pulmonary/Chest: Effort normal. No respiratory distress.   2L nasal cannula    Abdominal: Soft.   Musculoskeletal: Normal range of motion. She exhibits edema.   Neurological: She is alert and oriented to person, place, and time.   Skin: Skin is warm, dry and intact. Capillary refill takes less than 2 seconds. She is not diaphoretic.   Midline sternal incision c/d/i    Psychiatric: She has a normal mood and affect. Her speech is normal and behavior is normal. Judgment and thought content normal.   Vitals reviewed.      Significant Labs:  BMP:   Recent Labs   Lab 01/27/20  0645   *   *   K 4.8   CL 98   CO2 18*   BUN 39*   CREATININE 4.4*   CALCIUM 9.5   MG 2.2     CBC:   Recent Labs   Lab 01/27/20  0645   WBC 12.98*   RBC 3.12*   HGB 9.0*   HCT 29.9*   *   MCV 96   MCH 28.8   MCHC 30.1*       Significant Diagnostics:  I have reviewed all pertinent imaging results/findings within the past 24 hours.

## 2020-01-27 NOTE — ASSESSMENT & PLAN NOTE
- multimodal pain regimen  - home lexapro  - Extubated 1/21  - CXR daily  - IS  - wean NC  - PO metoprolol and amio   - Going for permacath today   - Plavix

## 2020-01-27 NOTE — PROGRESS NOTES
[facility]  Cardiothoracic Surgery  Progress Note    Patient Name: Suyapa Connelly  MRN: [id number]  Admission Date: 1/2/2020  Hospital Length of Stay: 25 days  Code Status: Full Code   Attending Physician: Huang Altamirano MD   Referring Provider: Self, Aaareferral  Principal Problem:Acute coronary syndrome            Subjective:     Post-Op Info:  Procedure(s) (LRB):  CORONARY ARTERY BYPASS GRAFT (CABG) x 1     Off Pump (N/A)   13 Days Post-Op     Interval History: NAEON. Going for permacath today. Awaiting rehab placement.     Review of Systems   Cardiovascular: Positive for dyspnea on exertion and leg swelling. Negative for chest pain.   Respiratory: Negative for shortness of breath.    Hematologic/Lymphatic: Negative for bleeding problem.   Genitourinary: Negative for dysuria.     Medications:  Continuous Infusions:  Scheduled Meds:   sodium chloride 0.9%   Intravenous Once    amiodarone  200 mg Oral Daily    aspirin  81 mg Oral Daily    atorvastatin  80 mg Oral QHS    clopidogrel  75 mg Oral Daily    escitalopram oxalate  10 mg Oral Daily    heparin (porcine)  5,000 Units Subcutaneous Q8H    metoprolol succinate  50 mg Oral Daily    pantoprazole  40 mg Oral BID    polyethylene glycol  17 g Oral Daily    scopolamine  1 patch Transdermal Q3 Days     PRN Meds:acetaminophen, albuterol-ipratropium, bisacodyl, Dextrose 10% Bolus, Dextrose 10% Bolus, glucagon (human recombinant), glucose, glucose, HYDROmorphone, insulin aspart U-100, metoclopramide HCl, metoprolol, ondansetron, oxyCODONE, oxyCODONE     Objective:     Vital Signs (Most Recent):  Temp: 97.8 °F (36.6 °C) (01/27/20 0800)  Pulse: 68 (01/27/20 0842)  Resp: 19 (01/27/20 0842)  BP: 131/62 (01/27/20 0800)  SpO2: 97 % (01/27/20 0842) Vital Signs (24h Range):  Temp:  [97.7 °F (36.5 °C)-98.4 °F (36.9 °C)] 97.8 °F (36.6 °C)  Pulse:  [64-74] 68  Resp:  [16-20] 19  SpO2:  [88 %-100 %] 97 %  BP: (121-150)/(60-69) 131/62     Weight:  99.5 kg (219 lb 5.7 oz)  Body mass index is 37.65 kg/m².    SpO2: 97 %  O2 Device (Oxygen Therapy): nasal cannula    Intake/Output - Last 3 Shifts       01/25 0700 - 01/26 0659 01/26 0700 - 01/27 0659 01/27 0700 - 01/28 0659    P.O. 720      I.V. (mL/kg)       Other 150      Total Intake(mL/kg) 870 (8.4)      Urine (mL/kg/hr) 200 (0.1)      Other 2300      Total Output 2500      Net -1630             Stool Occurrence  0 x           Lines/Drains/Airways     Peripheral Intravenous Line                 Midline Catheter Insertion/Assessment  - Single Lumen 01/21/20 1016 Right basilic vein (medial side of arm) 18g x 10cm 6 days                Physical Exam   Constitutional: She is oriented to person, place, and time. She appears well-developed and well-nourished. No distress.   HENT:   Head: Normocephalic and atraumatic.   Eyes: Pupils are equal, round, and reactive to light. EOM are normal.   Neck: Normal range of motion.   Cardiovascular: Normal rate, regular rhythm and normal pulses.   Pulmonary/Chest: Effort normal. No respiratory distress.   2L nasal cannula    Abdominal: Soft.   Musculoskeletal: Normal range of motion. She exhibits edema.   Neurological: She is alert and oriented to person, place, and time.   Skin: Skin is warm, dry and intact. Capillary refill takes less than 2 seconds. She is not diaphoretic.   Midline sternal incision c/d/i    Psychiatric: She has a normal mood and affect. Her speech is normal and behavior is normal. Judgment and thought content normal.   Vitals reviewed.      Significant Labs:  BMP:   Recent Labs   Lab 01/27/20  0645   *   *   K 4.8   CL 98   CO2 18*   BUN 39*   CREATININE 4.4*   CALCIUM 9.5   MG 2.2     CBC:   Recent Labs   Lab 01/27/20  0645   WBC 12.98*   RBC 3.12*   HGB 9.0*   HCT 29.9*   *   MCV 96   MCH 28.8   MCHC 30.1*       Significant Diagnostics:  I have reviewed all pertinent imaging results/findings within the past 24 hours.    Assessment/Plan:      Acute blood loss anemia  Expected post operatively   CBC daily     S/P CABG x 1  - multimodal pain regimen  - home lexapro  - Extubated 1/21  - CXR daily  - IS  - wean NC  - PO metoprolol and amio   - Going for permacath today   - Plavix     Weakness  PT/OT    Type 2 diabetes mellitus with hyperglycemia, with long-term current use of insulin  Endocrine following     Mixed hyperlipidemia  statin    ROSLYN (acute kidney injury)  - CRRT discontinued 1/21  - Successful HD trial 1/23, 1/25  - cont hyponatremia and elevated Cr  - PermaCath today with vascular sx, OK for plavix and heparin  - d/c LIJ trialysis    Dispo: [facility]. Awaiting rehab placement     Luisana Jennings PA-C  Cardiothoracic Surgery  [facility]

## 2020-01-27 NOTE — PLAN OF CARE
Problem: SLP Goal  Goal: SLP Goal  Description  Speech Language Pathology Goals  Goals expected to be met by 1/29:  1. Pt will tolerate least restrictive diet without s/s of aspiration.        Outcome: Ongoing, Progressing    Pt was seen today for ST session.

## 2020-01-27 NOTE — PT/OT/SLP PROGRESS
Speech Language Pathology Treatment    Patient Name:  Suyapa Connelly   MRN:  [id number]  Admitting Diagnosis: Acute coronary syndrome    Recommendations:                 General Recommendations:  Dysphagia therapy  Diet recommendations:  Puree, Liquid Diet Level: Thin   Aspiration Precautions: Strict aspiration precautions   General Precautions: Standard, fall, sternal  Communication strategies:  none    Subjective   Awake & alert. Seated in chair. NSG at bedside. Pt not given PO trials today 2/2 NPO going for a procedure.     Pain/Comfort:  · Pain Rating 1: 0/10  · Pain Rating Post-Intervention 2: 0/10    Objective:     Has the patient been evaluated by SLP for swallowing?   Yes  Keep patient NPO? No   Current Respiratory Status: nasal cannula      Pt reports that she is coughing more since surgery but that it does not seem related to swallowing. She reports that she is tolerating current diet without difficulty. Voice remains weak/hoarse. SLP reviewed all current diet recs & strict swallow precautions. SLP reviewed dysphagia exercises including effortful swallow & joan. Pt completed each x5 with good effort & fair ability.     Assessment:     Suyapa Connelly is a 54 y.o. female with an SLP diagnosis of Dysphagia.    Goals:   Multidisciplinary Problems     SLP Goals        Problem: SLP Goal    Goal Priority Disciplines Outcome   SLP Goal     SLP Ongoing, Progressing   Description:  Speech Language Pathology Goals  Goals expected to be met by 1/29:  1. Pt will tolerate least restrictive diet without s/s of aspiration.                         Plan:     · Patient to be seen:  5 x/week   · Plan of Care expires:  02/21/20  · Plan of Care reviewed with:  patient   · SLP Follow-Up:  Yes       Discharge recommendations:  rehabilitation facility     Time Tracking:     SLP Treatment Date:   01/27/20  Speech Start Time:  1455  Speech Stop Time:  1505     Speech Total Time (min):  10 min    Billable Minutes: Treatment  Swallowing Dysfunction 10    Dorothy Friedman CCC-SLP  01/27/2020

## 2020-01-27 NOTE — PROGRESS NOTES
[facility]  Vascular Surgery  Progress Note    Patient Name: Suyapa Connelly  MRN: [id number]  Admission Date: 1/2/2020  Primary Care Provider: Erlinda Rahman NP    Subjective:     Interval History: NAEON, trialysis removed, afebrile    Post-Op Info:  Procedure(s) (LRB):  CORONARY ARTERY BYPASS GRAFT (CABG) x 1     Off Pump (N/A)   13 Days Post-Op       Medications:  Continuous Infusions:  Scheduled Meds:   sodium chloride 0.9%   Intravenous Once    amiodarone  200 mg Oral Daily    aspirin  81 mg Oral Daily    atorvastatin  80 mg Oral QHS    clopidogrel  75 mg Oral Daily    escitalopram oxalate  10 mg Oral Daily    heparin (porcine)  5,000 Units Subcutaneous Q8H    metoprolol succinate  50 mg Oral Daily    pantoprazole  40 mg Oral BID    polyethylene glycol  17 g Oral Daily    scopolamine  1 patch Transdermal Q3 Days     PRN Meds:acetaminophen, albuterol-ipratropium, bisacodyl, Dextrose 10% Bolus, Dextrose 10% Bolus, glucagon (human recombinant), glucose, glucose, HYDROmorphone, insulin aspart U-100, metoclopramide HCl, metoprolol, ondansetron, oxyCODONE, oxyCODONE     Objective:     Vital Signs (Most Recent):  Temp: 98.3 °F (36.8 °C) (01/27/20 0412)  Pulse: 68 (01/27/20 0412)  Resp: 20 (01/26/20 2307)  BP: (!) 150/69 (01/27/20 0412)  SpO2: 95 % (01/27/20 0430) Vital Signs (24h Range):  Temp:  [97.7 °F (36.5 °C)-98.4 °F (36.9 °C)] 98.3 °F (36.8 °C)  Pulse:  [64-74] 68  Resp:  [16-25] 20  SpO2:  [88 %-100 %] 95 %  BP: (121-150)/(60-69) 150/69  Arterial Line BP: (112-125)/(50-54) 125/53         Physical Exam   Constitutional: She appears well-developed and well-nourished.   Neck:   Trialysis removed   Cardiovascular: Normal rate and regular rhythm.   Pulmonary/Chest: Effort normal. No respiratory distress.   Abdominal: Soft. She exhibits no distension.   Neurological: She is alert.   Skin: Skin is warm and dry.   Nursing note and vitals reviewed.      Significant Labs:  CBC:   Recent  Labs   Lab 01/26/20 0311   WBC 14.65*   RBC 3.08*   HGB 9.0*   HCT 29.0*      MCV 94   MCH 29.2   MCHC 31.0*     CMP:   Recent Labs   Lab 01/26/20 0311   *   CALCIUM 9.2   ALBUMIN 2.3*   PROT 7.9   *   K 4.5   CO2 19*      BUN 27*   CREATININE 3.7*   ALKPHOS 290*   *   AST 38   BILITOT 1.0       Significant Diagnostics:  I have reviewed all pertinent imaging results/findings within the past 24 hours.    Assessment/Plan:     ROSLYN (acute kidney injury)  Pt with 55 yo F with ROSLYN requiring HD    Will plan for permacath placement today  Keep NPO  Consent obtained    Please call with any questions or concerns        Teddy Amaya MD  Vascular Surgery  [facility]

## 2020-01-27 NOTE — PROGRESS NOTES
Oxycodone 10mg po given for c/o mid sternal chest incisional pain 8/10. Blood glucose 167. No coverage needed per s/s

## 2020-01-27 NOTE — ASSESSMENT & PLAN NOTE
Pt with 55 yo F with ROSLYN requiring HD    Will plan for permacath placement today  Keep NPO  Consent obtained    Please call with any questions or concerns

## 2020-01-28 PROBLEM — I48.0 PAROXYSMAL ATRIAL FIBRILLATION: Status: ACTIVE | Noted: 2020-01-28

## 2020-01-28 LAB
ALBUMIN SERPL BCP-MCNC: 2.3 G/DL (ref 3.5–5.2)
ALP SERPL-CCNC: 247 U/L (ref 55–135)
ALT SERPL W/O P-5'-P-CCNC: 72 U/L (ref 10–44)
ANION GAP SERPL CALC-SCNC: 14 MMOL/L (ref 8–16)
AST SERPL-CCNC: 29 U/L (ref 10–40)
BASOPHILS # BLD AUTO: 0.04 K/UL (ref 0–0.2)
BASOPHILS NFR BLD: 0.3 % (ref 0–1.9)
BILIRUB SERPL-MCNC: 0.9 MG/DL (ref 0.1–1)
BUN SERPL-MCNC: 50 MG/DL (ref 6–20)
CALCIUM SERPL-MCNC: 9.5 MG/DL (ref 8.7–10.5)
CHLORIDE SERPL-SCNC: 99 MMOL/L (ref 95–110)
CO2 SERPL-SCNC: 15 MMOL/L (ref 23–29)
CREAT SERPL-MCNC: 5 MG/DL (ref 0.5–1.4)
DIFFERENTIAL METHOD: ABNORMAL
EOSINOPHIL # BLD AUTO: 0.1 K/UL (ref 0–0.5)
EOSINOPHIL NFR BLD: 0.7 % (ref 0–8)
ERYTHROCYTE [DISTWIDTH] IN BLOOD BY AUTOMATED COUNT: 16 % (ref 11.5–14.5)
EST. GFR  (AFRICAN AMERICAN): 10.6 ML/MIN/1.73 M^2
EST. GFR  (NON AFRICAN AMERICAN): 9.2 ML/MIN/1.73 M^2
GLUCOSE SERPL-MCNC: 142 MG/DL (ref 70–110)
HBV SURFACE AG SERPL QL IA: NEGATIVE
HCT VFR BLD AUTO: 30.2 % (ref 37–48.5)
HGB BLD-MCNC: 9 G/DL (ref 12–16)
IMM GRANULOCYTES # BLD AUTO: 0.06 K/UL (ref 0–0.04)
IMM GRANULOCYTES NFR BLD AUTO: 0.5 % (ref 0–0.5)
INR PPP: 1.5 (ref 0.8–1.2)
LYMPHOCYTES # BLD AUTO: 1.2 K/UL (ref 1–4.8)
LYMPHOCYTES NFR BLD: 10.1 % (ref 18–48)
MAGNESIUM SERPL-MCNC: 2.2 MG/DL (ref 1.6–2.6)
MCH RBC QN AUTO: 28.8 PG (ref 27–31)
MCHC RBC AUTO-ENTMCNC: 29.8 G/DL (ref 32–36)
MCV RBC AUTO: 97 FL (ref 82–98)
MONOCYTES # BLD AUTO: 1.7 K/UL (ref 0.3–1)
MONOCYTES NFR BLD: 14.6 % (ref 4–15)
NEUTROPHILS # BLD AUTO: 8.5 K/UL (ref 1.8–7.7)
NEUTROPHILS NFR BLD: 73.8 % (ref 38–73)
NRBC BLD-RTO: 0 /100 WBC
PHOSPHATE SERPL-MCNC: 6.1 MG/DL (ref 2.7–4.5)
PLATELET # BLD AUTO: 410 K/UL (ref 150–350)
PMV BLD AUTO: 11.4 FL (ref 9.2–12.9)
POCT GLUCOSE: 119 MG/DL (ref 70–110)
POCT GLUCOSE: 196 MG/DL (ref 70–110)
POCT GLUCOSE: 205 MG/DL (ref 70–110)
POCT GLUCOSE: 256 MG/DL (ref 70–110)
POTASSIUM SERPL-SCNC: 5.4 MMOL/L (ref 3.5–5.1)
PROT SERPL-MCNC: 8.3 G/DL (ref 6–8.4)
PROTHROMBIN TIME: 14.4 SEC (ref 9–12.5)
RBC # BLD AUTO: 3.12 M/UL (ref 4–5.4)
SODIUM SERPL-SCNC: 128 MMOL/L (ref 136–145)
WBC # BLD AUTO: 11.54 K/UL (ref 3.9–12.7)

## 2020-01-28 PROCEDURE — 90935 PR HEMODIALYSIS, ONE EVALUATION: ICD-10-PCS | Mod: ,,, | Performed by: NURSE PRACTITIONER

## 2020-01-28 PROCEDURE — 63600175 PHARM REV CODE 636 W HCPCS: Performed by: STUDENT IN AN ORGANIZED HEALTH CARE EDUCATION/TRAINING PROGRAM

## 2020-01-28 PROCEDURE — 94664 DEMO&/EVAL PT USE INHALER: CPT

## 2020-01-28 PROCEDURE — 25000003 PHARM REV CODE 250: Performed by: STUDENT IN AN ORGANIZED HEALTH CARE EDUCATION/TRAINING PROGRAM

## 2020-01-28 PROCEDURE — 85025 COMPLETE CBC W/AUTO DIFF WBC: CPT

## 2020-01-28 PROCEDURE — 90935 HEMODIALYSIS ONE EVALUATION: CPT

## 2020-01-28 PROCEDURE — 92526 ORAL FUNCTION THERAPY: CPT

## 2020-01-28 PROCEDURE — 85610 PROTHROMBIN TIME: CPT

## 2020-01-28 PROCEDURE — 25000003 PHARM REV CODE 250: Performed by: NURSE PRACTITIONER

## 2020-01-28 PROCEDURE — 27000221 HC OXYGEN, UP TO 24 HOURS

## 2020-01-28 PROCEDURE — 63600175 PHARM REV CODE 636 W HCPCS: Performed by: NURSE PRACTITIONER

## 2020-01-28 PROCEDURE — 87340 HEPATITIS B SURFACE AG IA: CPT

## 2020-01-28 PROCEDURE — 20600001 HC STEP DOWN PRIVATE ROOM

## 2020-01-28 PROCEDURE — 94799 UNLISTED PULMONARY SVC/PX: CPT

## 2020-01-28 PROCEDURE — 99900035 HC TECH TIME PER 15 MIN (STAT)

## 2020-01-28 PROCEDURE — 36415 COLL VENOUS BLD VENIPUNCTURE: CPT

## 2020-01-28 PROCEDURE — 93010 ELECTROCARDIOGRAM REPORT: CPT | Mod: ,,, | Performed by: INTERNAL MEDICINE

## 2020-01-28 PROCEDURE — 80053 COMPREHEN METABOLIC PANEL: CPT

## 2020-01-28 PROCEDURE — 90935 HEMODIALYSIS ONE EVALUATION: CPT | Mod: ,,, | Performed by: NURSE PRACTITIONER

## 2020-01-28 PROCEDURE — 93005 ELECTROCARDIOGRAM TRACING: CPT

## 2020-01-28 PROCEDURE — 93010 EKG 12-LEAD: ICD-10-PCS | Mod: ,,, | Performed by: INTERNAL MEDICINE

## 2020-01-28 PROCEDURE — 83735 ASSAY OF MAGNESIUM: CPT

## 2020-01-28 PROCEDURE — 97803 MED NUTRITION INDIV SUBSEQ: CPT

## 2020-01-28 PROCEDURE — 25000003 PHARM REV CODE 250: Performed by: PHYSICIAN ASSISTANT

## 2020-01-28 PROCEDURE — 84100 ASSAY OF PHOSPHORUS: CPT

## 2020-01-28 PROCEDURE — 94761 N-INVAS EAR/PLS OXIMETRY MLT: CPT

## 2020-01-28 RX ORDER — AMIODARONE HYDROCHLORIDE 200 MG/1
400 TABLET ORAL 3 TIMES DAILY
Status: DISCONTINUED | OUTPATIENT
Start: 2020-01-28 | End: 2020-02-03

## 2020-01-28 RX ORDER — SEVELAMER CARBONATE 800 MG/1
800 TABLET, FILM COATED ORAL
Status: DISCONTINUED | OUTPATIENT
Start: 2020-01-28 | End: 2020-02-04 | Stop reason: HOSPADM

## 2020-01-28 RX ORDER — HEPARIN SODIUM 1000 [USP'U]/ML
1000 INJECTION, SOLUTION INTRAVENOUS; SUBCUTANEOUS
Status: DISCONTINUED | OUTPATIENT
Start: 2020-01-28 | End: 2020-02-04

## 2020-01-28 RX ORDER — METOPROLOL TARTRATE 1 MG/ML
5 INJECTION, SOLUTION INTRAVENOUS EVERY 5 MIN PRN
Status: COMPLETED | OUTPATIENT
Start: 2020-01-28 | End: 2020-01-28

## 2020-01-28 RX ADMIN — METOPROLOL TARTRATE 5 MG: 5 INJECTION INTRAVENOUS at 05:01

## 2020-01-28 RX ADMIN — HEPARIN SODIUM 5000 UNITS: 5000 INJECTION, SOLUTION INTRAVENOUS; SUBCUTANEOUS at 02:01

## 2020-01-28 RX ADMIN — HEPARIN SODIUM 5000 UNITS: 5000 INJECTION, SOLUTION INTRAVENOUS; SUBCUTANEOUS at 05:01

## 2020-01-28 RX ADMIN — PANTOPRAZOLE SODIUM 40 MG: 40 TABLET, DELAYED RELEASE ORAL at 01:01

## 2020-01-28 RX ADMIN — ASPIRIN 81 MG CHEWABLE TABLET 81 MG: 81 TABLET CHEWABLE at 01:01

## 2020-01-28 RX ADMIN — AMIODARONE HYDROCHLORIDE 400 MG: 200 TABLET ORAL at 09:01

## 2020-01-28 RX ADMIN — AMIODARONE HYDROCHLORIDE 400 MG: 200 TABLET ORAL at 02:01

## 2020-01-28 RX ADMIN — METOPROLOL SUCCINATE 50 MG: 50 TABLET, EXTENDED RELEASE ORAL at 09:01

## 2020-01-28 RX ADMIN — ESCITALOPRAM 10 MG: 5 TABLET, FILM COATED ORAL at 01:01

## 2020-01-28 RX ADMIN — HEPARIN SODIUM 1000 UNITS: 1000 INJECTION, SOLUTION INTRAVENOUS; SUBCUTANEOUS at 10:01

## 2020-01-28 RX ADMIN — METOPROLOL TARTRATE 5 MG: 5 INJECTION INTRAVENOUS at 11:01

## 2020-01-28 RX ADMIN — INSULIN ASPART 1 UNITS: 100 INJECTION, SOLUTION INTRAVENOUS; SUBCUTANEOUS at 11:01

## 2020-01-28 RX ADMIN — ATORVASTATIN CALCIUM 80 MG: 20 TABLET, FILM COATED ORAL at 09:01

## 2020-01-28 RX ADMIN — HEPARIN SODIUM 5000 UNITS: 5000 INJECTION, SOLUTION INTRAVENOUS; SUBCUTANEOUS at 09:01

## 2020-01-28 RX ADMIN — INSULIN ASPART 2 UNITS: 100 INJECTION, SOLUTION INTRAVENOUS; SUBCUTANEOUS at 05:01

## 2020-01-28 RX ADMIN — OXYCODONE HYDROCHLORIDE 10 MG: 10 TABLET ORAL at 02:01

## 2020-01-28 RX ADMIN — CLOPIDOGREL BISULFATE 75 MG: 75 TABLET, FILM COATED ORAL at 01:01

## 2020-01-28 RX ADMIN — SODIUM CHLORIDE 350 ML: 0.9 INJECTION, SOLUTION INTRAVENOUS at 08:01

## 2020-01-28 RX ADMIN — PANTOPRAZOLE SODIUM 40 MG: 40 TABLET, DELAYED RELEASE ORAL at 09:01

## 2020-01-28 RX ADMIN — SEVELAMER CARBONATE 800 MG: 800 TABLET, FILM COATED ORAL at 05:01

## 2020-01-28 RX ADMIN — SEVELAMER CARBONATE 800 MG: 800 TABLET, FILM COATED ORAL at 01:01

## 2020-01-28 NOTE — PLAN OF CARE
01/28/20 1007   Post-Acute Status   Post-Acute Authorization Placement   Post-Acute Placement Status Pending Post-Acute Clinical Review  (rehab)     EDUARD informed Cecilia with [facility] that pt is ready for discharge and that they are patient's choice.  Cecilia stated that referral would be reviewed.  EDUARD will continue to follow.    Jenna Bonilla LMSW  [facility]  [id number]

## 2020-01-28 NOTE — PROGRESS NOTES
"[facility]  Adult Nutrition  Progress Note    SUMMARY       Recommendations    Recommendation:   1. Continue current diet per SLP recommendations. Encourage PO intake.   2. If PO intake < 50%, add Novasource Renal TID.   3. RD to monitor & follow up.    Goals: PO intake > 50% by RD follow up  Nutrition Goal Status: new  Communication of RD Recs: other (comment)(POC)    Reason for Assessment    Reason For Assessment: RD follow-up  Diagnosis: surgery/postoperative complications(s/p CABG 1/14)  Relevant Medical History: CAD, CHF, DM2, HLD, HTN, GERD, PAD  Interdisciplinary Rounds: attended  General Information Comments: Pt sitting up in bed with  at bedside this afternoon. Per , pt's appetite has been improving since Friday and he states she has been consuming 2/3 of her meals. Diet advanced to soft per SLP this afternoon. Wt loss since last RD visit likely fluid related, noting pt on HD and is -6.5L. Pt continues to appear nourished, remains at risk for malnutrition 2/2 poor PO last week (25% per RN documentation) but appears to be improving.  Nutrition Discharge Planning: Adequate nutrition via PO intake.    Nutrition Risk Screen    Nutrition Risk Screen: no indicators present    Nutrition/Diet History    Spiritual, Cultural Beliefs, Congregation Practices, Values that Affect Care: no  Factors Affecting Nutritional Intake: ([place] diet)    Anthropometrics    Temp: 97.7 °F (36.5 °C)  Height Method: Stated  Height: 5' 4" (162.6 cm)  Height (inches): 64 in  Weight Method: Standard Scale  Weight: 95.4 kg (210 lb 5.1 oz)  Weight (lb): 210.32 lb  Ideal Body Weight (IBW), Female: 120 lb  % Ideal Body Weight, Female (lb): 185 %  BMI (Calculated): 36.1  BMI Grade: 35 - 39.9 - obesity - grade II    Lab/Procedures/Meds    Pertinent Labs Reviewed: reviewed  Pertinent Labs Comments: Na 128, K 5.4, BUN 50, Cr 5, GFR 10.6, Glu 142, Alb 2.3  Pertinent Medications Reviewed: reviewed  Pertinent Medications " Comments: amiodarone, statin, heparin, insulin, metoclopramide, pantoprazole, sevelamer    Estimated/Assessed Needs    Weight Used For Calorie Calculations: 95.4 kg (210 lb 5.1 oz)  Energy Calorie Requirements (kcal): 1544 kcal/day  Energy Need Method: [place]-St Jeor(no AF 2/2 obesity)  Protein Requirements:  g/day(1.5-2.0 g/kg)  Weight Used For Protein Calculations: 54.5 kg (120 lb 2.4 oz)(IBW)  Fluid Requirements (mL): 1 mL/kcal or per MD  Estimated Fluid Requirement Method: RDA Method  RDA Method (mL): 1544  CHO Requirement: 193g CHO/day or 50% total kcal    Nutrition Prescription Ordered    Current Diet Order: Soft  Oral Nutrition Supplement: Impact AR    Evaluation of Received Nutrient/Fluid Intake    I/O: -43 mL x 24 hrs  Comments: LBM 1/23  Tolerance: tolerating  % Intake of Estimated Energy Needs: 50 - 75 %  % Meal Intake: 50 - 75 %    Nutrition Risk    Level of Risk/Frequency of Follow-up: (1x/week)     Assessment and Plan    Nutrition Problem  Increased nutrient needs     Related to (etiology):   Physiological causes related to healing     Signs and Symptoms (as evidenced by):   S/p CABG 1/14      Interventions (treatment strategy):  Collaboration of nutrition care with other providers     Nutrition Diagnosis Status:   Continues        Nutrition Problem  Inadequate energy intake     Related to (etiology):   Decreased ability to consume sufficient energy     Signs and Symptoms (as evidenced by):   NPO with no alternative means of nutrition at this time     Interventions (treatment strategy):  Collaboration of nutrition care with other providers     Nutrition Diagnosis Status:   Resolved - pt advanced to dysphagia soft diet 1/28    Monitor and Evaluation    Food and Nutrient Intake: energy intake, food and beverage intake  Food and Nutrient Adminstration: diet order  Knowledge/Beliefs/Attitudes: food and nutrition knowledge/skill  Physical Activity and Function: nutrition-related ADLs and  IADLs  Anthropometric Measurements: weight, weight change  Biochemical Data, Medical Tests and Procedures: electrolyte and renal panel, gastrointestinal profile, glucose/endocrine profile, inflammatory profile  Nutrition-Focused Physical Findings: overall appearance     Nutrition Follow-Up    RD Follow-up?: Yes

## 2020-01-28 NOTE — PLAN OF CARE
Pt is A, A, Ox4. Calm, cooperative. Free of falls, trauma, and injuries. Skin intact ex incision. Pt educated on treatment plan. Pt demonstrates and verbalizes understanding. VSS. Dialysis done today. Pt in Fib RVR, given IV Metoprolol and increased PO Amio dose. Pureed diet, seen by SLP. BG monitored. JULIAS at BS. Plan of care reviewed with pt.

## 2020-01-28 NOTE — PROGRESS NOTES
RN contacted MD Casarez regarding pt HR.  Pt HR still switching between afib w RVR and SB.  RN to administer scheduled lopressor.  Pt most recent BP of 106/61 Map of 80.  Will continue to monitor.

## 2020-01-28 NOTE — NURSING TRANSFER
Nursing Transfer Note      1/28/2020     Transfer From: dialysis    Transfer via stretcher    Transfer with 2L to O2, cardiac monitoring    Transported by transport tech and RN    Medicines sent: no    Chart send with patient: Yes    Notified: spouse    Patient reassessed at: 1/28/2020 12:37 PM  (date, time)    Upon arrival to floor: cardiac monitor applied, patient oriented to room, call bell in reach and bed in lowest position

## 2020-01-28 NOTE — PROGRESS NOTES
2 hour and 45 minute dialysis complete.  Tx ended early due to 3 episodes of Afib with RVR during tx.  Dr. Galvez notified.  Three doses of IV metoprolol given without conversion.  Patient converted to NSR without intervention the 1st 2 times.  Patient asymptomatic.  WENDI Jennings PA-C notified.  Rec'd okay to return patient to room 3092 per PA.  LIJ permcath flushed and hep locked.  No fluid removed. Transported from [facility] to room 3092 via stretcher by transporter and this RN.

## 2020-01-28 NOTE — PROGRESS NOTES
[facility]  Cardiothoracic Surgery  Progress Note    Patient Name: Suyapa Connelly  MRN: [id number]  Admission Date: 1/2/2020  Hospital Length of Stay: 26 days  Code Status: Full Code   Attending Physician: Huang Altamirano MD   Referring Provider: Self, Aaareferral  Principal Problem:Acute coronary syndrome            Subjective:     Post-Op Info:  Procedure(s) (LRB):  Insertion, Catheter, Central Venous, Hemodialysis (N/A)   1 Day Post-Op     Interval History: Patient had afib with RVR last night and converted with IV metop and increased amio. Today while at HD patient went into afib with RVR and was given 3 doses of IV metop with HR maintaining at 117. Will increase amiodarone to 400mg TID. Patient produced some urine upon return to room.     Review of Systems   Constitution: Positive for malaise/fatigue.   Cardiovascular: Negative for chest pain and palpitations.   Respiratory: Negative for shortness of breath.    Hematologic/Lymphatic: Negative for bleeding problem.   Gastrointestinal: Negative for abdominal pain.   Neurological: Positive for weakness.     Medications:  Continuous Infusions:   heparin (porcine)       Scheduled Meds:   sodium chloride 0.9%   Intravenous Once    amiodarone  400 mg Oral TID    aspirin  81 mg Oral Daily    atorvastatin  80 mg Oral QHS    clopidogrel  75 mg Oral Daily    escitalopram oxalate  10 mg Oral Daily    heparin (porcine)  5,000 Units Subcutaneous Q8H    metoprolol succinate  50 mg Oral Daily    pantoprazole  40 mg Oral BID    polyethylene glycol  17 g Oral Daily    scopolamine  1 patch Transdermal Q3 Days    sevelamer carbonate  800 mg Oral TID WM     PRN Meds:acetaminophen, albuterol-ipratropium, bisacodyl, Dextrose 10% Bolus, Dextrose 10% Bolus, glucagon (human recombinant), glucose, glucose, heparin (porcine), heparin (porcine), HYDROmorphone, insulin aspart U-100, metoclopramide HCl, metoprolol, ondansetron, oxyCODONE, oxyCODONE      Objective:     Vital Signs (Most Recent):  Temp: 97.7 °F (36.5 °C) (01/28/20 1241)  Pulse: (!) 147 (01/28/20 1241)  Resp: 18 (01/28/20 1241)  BP: 93/61 (01/28/20 1241)  SpO2: 95 % (01/28/20 1241) Vital Signs (24h Range):  Temp:  [97.5 °F (36.4 °C)-97.8 °F (36.6 °C)] 97.7 °F (36.5 °C)  Pulse:  [] 147  Resp:  [16-18] 18  SpO2:  [94 %-98 %] 95 %  BP: ()/(49-88) 93/61     Weight: 95.4 kg (210 lb 5.1 oz)  Body mass index is 36.1 kg/m².    SpO2: 95 %  O2 Device (Oxygen Therapy): nasal cannula    Intake/Output - Last 3 Shifts       01/26 0700 - 01/27 0659 01/27 0700 - 01/28 0659 01/28 0700 - 01/29 0659    P.O.  0     Other   745    Total Intake(mL/kg)  0 (0) 745 (7.8)    Urine (mL/kg/hr)       Other   788    Total Output   788    Net  0 -43           Stool Occurrence 0 x            Lines/Drains/Airways     Central Venous Catheter Line                 Hemodialysis Catheter 01/27/20 1624 left subclavian less than 1 day          Peripheral Intravenous Line                 Midline Catheter Insertion/Assessment  - Single Lumen 01/21/20 1016 Right basilic vein (medial side of arm) 18g x 10cm 7 days                Physical Exam   Constitutional: She is oriented to person, place, and time. She appears well-developed and well-nourished. No distress.   HENT:   Head: Normocephalic and atraumatic.   Eyes: Pupils are equal, round, and reactive to light. EOM are normal.   Neck: Normal range of motion.   Cardiovascular: Normal pulses.   afib with rvr   Pulmonary/Chest: Effort normal. No respiratory distress.   Abdominal: Soft.   Musculoskeletal: Normal range of motion.   Neurological: She is alert and oriented to person, place, and time.   Skin: Skin is warm, dry and intact. Capillary refill takes less than 2 seconds. She is not diaphoretic.   Midline sternal incision c/d/i with dermabond      Psychiatric: She has a normal mood and affect. Her speech is normal and behavior is normal. Judgment and thought content normal.    Vitals reviewed.      Significant Labs:  BMP:   Recent Labs   Lab 01/28/20  0413   *   *   K 5.4*   CL 99   CO2 15*   BUN 50*   CREATININE 5.0*   CALCIUM 9.5   MG 2.2     CBC:   Recent Labs   Lab 01/28/20  0414   WBC 11.54   RBC 3.12*   HGB 9.0*   HCT 30.2*   *   MCV 97   MCH 28.8   MCHC 29.8*       Significant Diagnostics:  I have reviewed all pertinent imaging results/findings within the past 24 hours.    Assessment/Plan:     Paroxysmal atrial fibrillation  Patient went into afib with RVR during HD and HR sustained at 117 despite 3 doses of IV metop     Amiodarone increased to 400 TID   PO metop     Acute blood loss anemia  Expected post operatively   CBC daily     S/P CABG x 1  - multimodal pain regimen  - home lexapro  - Extubated 1/21  - CXR daily  - IS  - wean NC  - PO metoprolol and amio 400 TID   - Plavix     Weakness  PT/OT    Type 2 diabetes mellitus with hyperglycemia, with long-term current use of insulin  Endocrine following     Mixed hyperlipidemia  statin    ROSLYN (acute kidney injury)  - CRRT discontinued 1/21  - Successful HD trial 1/23, 1/25, 1/28  - HD stopped 1 hour early today as patient went into afib with RVR   - Made some urine when having a bowel movement   - cont hyponatremia and elevated Cr  - PermaCath 1/27 with vascular sx  - d/c LIJ trialysis      Dispo: CTSU. Awaiting rehab placement     Luisana Jennings PA-C  Cardiothoracic Surgery  [facility]

## 2020-01-28 NOTE — PT/OT/SLP PROGRESS
Occupational Therapy      Patient Name:  Suyapa Connelly   MRN:  [id number]    Patient not seen today secondary to off unit for HD. Per medical chart, pt had episode of Afib with RVR during HD. OT to check pts status at later date for continued therapy   ANNY Dover  1/28/2020

## 2020-01-28 NOTE — PT/OT/SLP PROGRESS
"Speech Language Pathology Treatment    Patient Name:  Suyapa Connelly   MRN:  [id number]  Admitting Diagnosis: Acute coronary syndrome    Recommendations:                 General Recommendations:  monitor diet tolerance; assess readiness for further diet advancement  Diet recommendations:  Dental Soft, Liquid Diet Level: Thin   Aspiration Precautions: 1 bite/sip at a time, Alternating bites/sips, Avoid talking while eating, Feed only when awake/alert, Frequent oral care, HOB to 90 degrees, Meds whole 1 at a time, Monitor for s/s of aspiration, Remain upright 30 minutes post meal, Small bites/sips and Strict aspiration precautions   General Precautions: Standard, aspiration, fall, sternal  Communication strategies:  go to room if call light pushed    Subjective     "I'm better."  "I just want to go home."     Pain/Comfort:  · Pain Rating 1: 0/10    Objective:     Has the patient been evaluated by SLP for swallowing?   Yes  Keep patient NPO? No   Current Respiratory Status: room air      Pt see sitting at EOB with  present.  Pureed lunch tray present, but pt/ stated they were waiting to eat until SLP came to observe pt with PO after pt was at HD all morning.  Pt was eating grapes, however, brought by .  Pt masticating and clearing oral cavity, but expelling pieces of skin from grape at times.  Pt agreeable to accept trials of a turkey sandwich to assess for readiness for diet upgrade from puree.  Pt observing consuming nearly 1/2 the sandwich with functional oral management.  Throat clear x 2 present. Pt also taking straw sips of water and lemonade without s/s of aspiration.  SLP educated pt/ on recommendations to advance diet to dental soft consistencies, explaining the differences between various diet levels, concerns for advancing diet too quickly, increasing pt's endurance for managing more advanced textures, and plan to monitor diet tolerance and assess for readiness for further diet " advancement.  Pt/ expressed understanding.  Nurse and PA with primary team notified of recommendations.     Assessment:     Suyapa Connelly is a 54 y.o. female with an SLP diagnosis of Dysphagia (improving).     Goals:   Multidisciplinary Problems     SLP Goals        Problem: SLP Goal    Goal Priority Disciplines Outcome   SLP Goal     SLP Ongoing, Progressing   Description:  Speech Language Pathology Goals  Goals expected to be met by 1/29:  1. Pt will tolerate least restrictive diet without s/s of aspiration.                         Plan:     · Patient to be seen:  4 x/week   · Plan of Care expires:  02/21/20  · Plan of Care reviewed with:  patient, spouse   · SLP Follow-Up:  Yes       Discharge recommendations:  rehabilitation facility     Time Tracking:     SLP Treatment Date:   01/28/20  Speech Start Time:  1333  Speech Stop Time:  1349     Speech Total Time (min):  16 min    Billable Minutes: Treatment Swallowing Dysfunction 8 and Seld Care/Home Management Training 8    MARISOL Castellon, CCC-SLP  01/28/2020     MARISOL Castellon, CCC-SLP  Speech Language Pathologist  [phone]  1/28/2020

## 2020-01-28 NOTE — PLAN OF CARE
Recommendations     Recommendation:   1. Continue current diet per SLP recommendations. Encourage PO intake.   2. If PO intake < 50%, add Novasource Renal TID.   3. RD to monitor & follow up.     Goals: PO intake > 50% by RD follow up  Nutrition Goal Status: new  Communication of RD Recs: other (comment)(POC)

## 2020-01-28 NOTE — PROGRESS NOTES
[facility] NOTE    Suyapa Connelly is a 54 y.o. female currently on hemodialysis for removal of uremic toxins and volume.     Patient seen and evaluated on hemodialysis, tolerating treatment, see HD flowsheet for vitals and assessments.    No Hypotension, chest pain, shortness of breath, cramping, nausea or vomiting.      Labs have been reviewed and the dialysate bath has been adjusted.    Labs:      Recent Labs   Lab 01/24/20  0119  01/26/20 0311 01/27/20  0645 01/28/20  0413   *   < > 132* 128* 128*   K 4.4   < > 4.5 4.8 5.4*      < > 102 98 99   CO2 19*   < > 19* 18* 15*   BUN 19   < > 27* 39* 50*   CREATININE 3.3*   < > 3.7* 4.4* 5.0*   CALCIUM 9.1   < > 9.2 9.5 9.5   PHOS 4.0  --   --  5.2* 6.1*    < > = values in this interval not displayed.       Recent Labs   Lab 01/26/20 0311 01/27/20  0645 01/28/20  0414   WBC 14.65* 12.98* 11.54   HGB 9.0* 9.0* 9.0*   HCT 29.0* 29.9* 30.2*    365* 410*        Assessment/Plan    Ultrafiltration goal: 2 L as tolerated, keep MAP >65.  - Seen on dialysis this morning, tolerating session with current UFR, no complications.    - Patient HR in 130s noted to be Afib w/ RVR per RN, converted back to NSR without medications. UFR adjusted.   - No lab stick/BP intake on access site  - Will continue dialysis treatments while in-patient  - Continue to monitor intake and output, daily weights   - Avoid nephrotoxic medication and renal dose medications to GFR    Anemia of ESRD  - Hgb 9.0, with goal of HBG to be > 10, will consider NAEEM if has enough iron store    BMM  - Renal diet with protein intake goal 1.5 g/kg/d  - Novasource with meals  - Phos 6.1, will start Sevelamer with meals   - Daily renal function panel     Sarah Saba, CHRIS, APRN, FNP-C  Nephrology Department  Pager:  [phone]

## 2020-01-28 NOTE — CARE UPDATE
- 180      Kidney function declining as noted per chart review. Expect decrease in insulin needs/requirements.     Continue:  Low Dose SQ Insulin Correction Scale PRN BG excursions.   BG Monitoring AC/HS      ** Please call Endocrine for any BG related issues **  ** Please notify Endocrine for any change and/or advance in diet**     Discharge Planning:    TBD. Please notify endocrinology prior to discharge.

## 2020-01-28 NOTE — PROGRESS NOTES
RN contacted MD Casarez regarding pt's HR.  Pt had converted to SR/SB with a rate of 50-60 from [id number].  Pt recent converted back to Afib w RVR at a rate of 120-150.  MD stated to administer scheduled doses of amiodarone and IV Lopressor.  After speaking with MD pt's HR will break and convert to SB with a HR of 50-60.  MD okay with holding IV lopressor and administering amio.  RN to administer amio and monitor HR. Will continue to monitor.

## 2020-01-28 NOTE — ASSESSMENT & PLAN NOTE
- CRRT discontinued 1/21  - Successful HD trial 1/23, 1/25, 1/28  - HD stopped 1 hour early today as patient went into afib with RVR   - Made some urine when having a bowel movement   - cont hyponatremia and elevated Cr  - PermaCath 1/27 with vascular sx  - d/c J trialysis

## 2020-01-28 NOTE — PLAN OF CARE
Plan of care discussed with patient/family. Patient ambulating independently with standby assistance, fall precautions remain in place. Continuing to encourage sternal precautions, IS and ambulation. MSI and chest tube remains MACKENZIE. Patient has no complaints of pain at this time. Discussed medications and care. Permacath placed in LCW. Pt to be prepared for dialysis 1/28/2020. Converted to afib RVR. MD notified. Metoprolol IVP ordered and administered. Patient has no questions at this time. Will continue to monitor.

## 2020-01-28 NOTE — ASSESSMENT & PLAN NOTE
- multimodal pain regimen  - home lexapro  - Extubated 1/21  - CXR daily  - IS  - wean NC  - PO metoprolol and amio 400 TID   - Plavix

## 2020-01-28 NOTE — NURSING TRANSFER
Nursing Transfer Note      1/28/2020     Transfer To: dialysis    Transfer via stretcher    Transfer with 2L to O2, cardiac monitoring    Transported by transport tech    Medicines sent: insulin pens and IV Lopressor PRN    Chart send with patient: Yes    Notified: spouse

## 2020-01-28 NOTE — CARE UPDATE
VASCULAR SURGERY NOTE  Patient s/p LIJ perm cath placement. Patient has received dialysis through her catheter, no issues with the catheter.   Vascular surgery will sign off at this time. Please feel free to contact us with any questions or concerns.     Genny Aldana MD  Vascular Surgery, PGY-I  Pager: [phone]  1/28/2020 4:47 PM

## 2020-01-28 NOTE — PLAN OF CARE
Problem: SLP Goal  Goal: SLP Goal  Description  Speech Language Pathology Goals  Goals expected to be met by 1/29:  1. Pt will tolerate least restrictive diet without s/s of aspiration.        Outcome: Ongoing, Progressing  Pt overall status improving.  Safe to advance to a dental soft diet. Cont thin liquids.  Will monitor diet tolerance and assess for readiness for further diet advancement.   MARISOL Castellon, CCC-SLP  Speech Language Pathologist  [phone]  1/28/2020

## 2020-01-28 NOTE — CARE UPDATE
- 180      Kidney function worsens as noted per chart review. Expect decrease in insulin needs/requirements.     Continue:  Low Dose SQ Insulin Correction Scale PRN BG excursions.   BG Monitoring AC/HS      ** Please call Endocrine for any BG related issues **  ** Please notify Endocrine for any change and/or advance in diet**     Discharge Planning:    TBD. Please notify endocrinology prior to discharge.

## 2020-01-28 NOTE — PT/OT/SLP PROGRESS
Physical Therapy      Patient Name:  Suyapa Connelly   MRN:  [id number]  Admitting Diagnosis:  Acute coronary syndrome   Recent Surgery: Procedure(s) (LRB):  Insertion, Catheter, Central Venous, Hemodialysis (N/A) 1 Day Post-Op  Admit Date: 1/2/2020  Length of Stay: 26 days    Patient not seen today due to Dialysis in AM. Per chart review pt with episodes of A Fib with RVR. Suyapa Connelly's plan of care and PT goals reviewed on this date and remain appropriate. Will follow-up for progressive mobility as medically appropriate.    Courtney Meier, PT, DPT  1/28/2020  Pager: [phone]

## 2020-01-28 NOTE — SUBJECTIVE & OBJECTIVE
Interval History: Patient had afib with RVR last night and converted with IV metop and increased amio. Today while at HD patient went into afib with RVR and was given 3 doses of IV metop with HR maintaining at 117. Will increase amiodarone to 400mg TID. Patient produced some urine upon return to room.     Review of Systems   Constitution: Positive for malaise/fatigue.   Cardiovascular: Negative for chest pain and palpitations.   Respiratory: Negative for shortness of breath.    Hematologic/Lymphatic: Negative for bleeding problem.   Gastrointestinal: Negative for abdominal pain.   Neurological: Positive for weakness.     Medications:  Continuous Infusions:   heparin (porcine)       Scheduled Meds:   sodium chloride 0.9%   Intravenous Once    amiodarone  400 mg Oral TID    aspirin  81 mg Oral Daily    atorvastatin  80 mg Oral QHS    clopidogrel  75 mg Oral Daily    escitalopram oxalate  10 mg Oral Daily    heparin (porcine)  5,000 Units Subcutaneous Q8H    metoprolol succinate  50 mg Oral Daily    pantoprazole  40 mg Oral BID    polyethylene glycol  17 g Oral Daily    scopolamine  1 patch Transdermal Q3 Days    sevelamer carbonate  800 mg Oral TID WM     PRN Meds:acetaminophen, albuterol-ipratropium, bisacodyl, Dextrose 10% Bolus, Dextrose 10% Bolus, glucagon (human recombinant), glucose, glucose, heparin (porcine), heparin (porcine), HYDROmorphone, insulin aspart U-100, metoclopramide HCl, metoprolol, ondansetron, oxyCODONE, oxyCODONE     Objective:     Vital Signs (Most Recent):  Temp: 97.7 °F (36.5 °C) (01/28/20 1241)  Pulse: (!) 147 (01/28/20 1241)  Resp: 18 (01/28/20 1241)  BP: 93/61 (01/28/20 1241)  SpO2: 95 % (01/28/20 1241) Vital Signs (24h Range):  Temp:  [97.5 °F (36.4 °C)-97.8 °F (36.6 °C)] 97.7 °F (36.5 °C)  Pulse:  [] 147  Resp:  [16-18] 18  SpO2:  [94 %-98 %] 95 %  BP: ()/(49-88) 93/61     Weight: 95.4 kg (210 lb 5.1 oz)  Body mass index is 36.1 kg/m².    SpO2: 95 %  O2 Device  (Oxygen Therapy): nasal cannula    Intake/Output - Last 3 Shifts       01/26 0700 - 01/27 0659 01/27 0700 - 01/28 0659 01/28 0700 - 01/29 0659    P.O.  0     Other   745    Total Intake(mL/kg)  0 (0) 745 (7.8)    Urine (mL/kg/hr)       Other   788    Total Output   788    Net  0 -43           Stool Occurrence 0 x            Lines/Drains/Airways     Central Venous Catheter Line                 Hemodialysis Catheter 01/27/20 1624 left subclavian less than 1 day          Peripheral Intravenous Line                 Midline Catheter Insertion/Assessment  - Single Lumen 01/21/20 1016 Right basilic vein (medial side of arm) 18g x 10cm 7 days                Physical Exam   Constitutional: She is oriented to person, place, and time. She appears well-developed and well-nourished. No distress.   HENT:   Head: Normocephalic and atraumatic.   Eyes: Pupils are equal, round, and reactive to light. EOM are normal.   Neck: Normal range of motion.   Cardiovascular: Normal pulses.   afib with rvr   Pulmonary/Chest: Effort normal. No respiratory distress.   Abdominal: Soft.   Musculoskeletal: Normal range of motion.   Neurological: She is alert and oriented to person, place, and time.   Skin: Skin is warm, dry and intact. Capillary refill takes less than 2 seconds. She is not diaphoretic.   Midline sternal incision c/d/i with dermabond      Psychiatric: She has a normal mood and affect. Her speech is normal and behavior is normal. Judgment and thought content normal.   Vitals reviewed.      Significant Labs:  BMP:   Recent Labs   Lab 01/28/20  0413   *   *   K 5.4*   CL 99   CO2 15*   BUN 50*   CREATININE 5.0*   CALCIUM 9.5   MG 2.2     CBC:   Recent Labs   Lab 01/28/20  0414   WBC 11.54   RBC 3.12*   HGB 9.0*   HCT 30.2*   *   MCV 97   MCH 28.8   MCHC 29.8*       Significant Diagnostics:  I have reviewed all pertinent imaging results/findings within the past 24 hours.

## 2020-01-28 NOTE — ASSESSMENT & PLAN NOTE
Patient went into afib with RVR during HD and HR sustained at 117 despite 3 doses of IV metop     Amiodarone increased to 400 TID   PO metop

## 2020-01-28 NOTE — PROGRESS NOTES
RN contacted MD Casarez regarding pt's HR.  Pt had sustained SR/SB for approximately 6 hours.  At 0350 pt converted back into afib w RVR while asleep.  Pt remains asymptomatic and pressures WNL.  Telephone verbal order for lopressor.  Pt frequently converting between afib w rvr and SR/SB.  No patient complaints. Pt denies any cardiac symptoms.  Will continue to monitor.

## 2020-01-28 NOTE — OP NOTE
Surgery Date: 1/27/2020      Surgeon(s) and Role:     * ESTEBAN Gomez III, MD - Primary     Assisting Surgeon: LAZARA Hamilton     Pre-op Diagnosis:  Injury of right kidney, initial encounter [S37.001A]     Post-op Diagnosis:  Post-Op Diagnosis Codes:     * Injury of right kidney, initial encounter [S37.001A]     PROCEDURES:     1. Tunneled catheter placement (L IJ 23 cm 14.5 fr Glidepath)  2. US guided L IJ cannulation  3. Conscious sedation     Anesthesia: RN directed sedation and local     Description of Procedure: Successful placement, may use     Description of the findings of the procedure:Afib vs SVT during insertion, ,  BP stable.     Estimated Blood Loss: 10cc         Specimens:       Specimen (12h ago, onward)     None     After obtaining consent, the patient was taken to the Cath lab. Sedation was administered. The neck and chest were prepped and draped in usual sterile fashion. We began using ultrasound guidance to examine the right internal jugular vein. It appeared thrombosed. The left internal jugular vein was patent without thrombosis.   We felt the left IJ appeared suitable for access for hemodialysis catheter and a permanent recording was placed on the patient's chart. We accessed the internal jugular vein using a Seldinger technique with an 18-gauge single wall needle and a J-wire was passed into the superior vena cava through the heart into the inferior vena cava. The wire position was confirmed with intraoperative fluoroscopy, then used a serial dilators to dilate the tract of the dialysis catheter. Counterincision was made   on the chest wall just below the clavicle. Local anesthesia was used to   anesthetize the track and a tunneler was used to pass the dual lumen catheter   underneath the chest wall until the felt cuff was just underneath the counter   incision. The large peel-away sheath was then inserted over the guidewire under   fluoroscopic guidance. The dilator and wires were  removed. The catheter was   placed through the peel-away sheath and positioned appropriately at the right   atrial SVC junction. Fluoroscopy was used to confirm that the catheter tip was   in appropriate position that there was no kinking at the apex of the catheter.   A permanent recording of the catheter position was also taken with fluoroscopy.   Both lumens had excellent draw and flush. The catheter was then secured in   place with 3-0 Prolene. The counterincision in the neck was closed with a 3-0   Monocryl and the catheter was then locked with 1000 unit/mL heparin and sterile   dressing was applied.

## 2020-01-28 NOTE — PLAN OF CARE
Patient remained free of falls, trauma, injury, and skin breakdown. Pt in afib with rvr at start of shift; medications administered and pt VSS.  No patient complaints.  Glucose monitored; insulin not indicated.  IS instruction and education provided; pt requires reinforcement. Fall and sternal precautions in place; pt requires frequent reeducation- reinforcement required.  Camera and family member at bedside.  Pt to receive dialysis today.  Plan of care reviewed; patient verbalized understanding- needs reinforcement. All questions and concerns addressed; will continue to monitor.

## 2020-01-29 LAB
ALBUMIN SERPL BCP-MCNC: 2.6 G/DL (ref 3.5–5.2)
ALP SERPL-CCNC: 253 U/L (ref 55–135)
ALT SERPL W/O P-5'-P-CCNC: 64 U/L (ref 10–44)
ANION GAP SERPL CALC-SCNC: 14 MMOL/L (ref 8–16)
AST SERPL-CCNC: 30 U/L (ref 10–40)
BASOPHILS # BLD AUTO: 0.05 K/UL (ref 0–0.2)
BASOPHILS NFR BLD: 0.5 % (ref 0–1.9)
BILIRUB SERPL-MCNC: 1.1 MG/DL (ref 0.1–1)
BUN SERPL-MCNC: 31 MG/DL (ref 6–20)
CALCIUM SERPL-MCNC: 9.7 MG/DL (ref 8.7–10.5)
CHLORIDE SERPL-SCNC: 93 MMOL/L (ref 95–110)
CO2 SERPL-SCNC: 24 MMOL/L (ref 23–29)
CREAT SERPL-MCNC: 4.3 MG/DL (ref 0.5–1.4)
DIFFERENTIAL METHOD: ABNORMAL
EOSINOPHIL # BLD AUTO: 0.1 K/UL (ref 0–0.5)
EOSINOPHIL NFR BLD: 0.9 % (ref 0–8)
ERYTHROCYTE [DISTWIDTH] IN BLOOD BY AUTOMATED COUNT: 16 % (ref 11.5–14.5)
EST. GFR  (AFRICAN AMERICAN): 12.7 ML/MIN/1.73 M^2
EST. GFR  (NON AFRICAN AMERICAN): 11.1 ML/MIN/1.73 M^2
FERRITIN SERPL-MCNC: 1756 NG/ML (ref 20–300)
GLUCOSE SERPL-MCNC: 198 MG/DL (ref 70–110)
HCT VFR BLD AUTO: 29.4 % (ref 37–48.5)
HGB BLD-MCNC: 9 G/DL (ref 12–16)
IMM GRANULOCYTES # BLD AUTO: 0.03 K/UL (ref 0–0.04)
IMM GRANULOCYTES NFR BLD AUTO: 0.3 % (ref 0–0.5)
INR PPP: 1.5 (ref 0.8–1.2)
IRON SERPL-MCNC: 38 UG/DL (ref 30–160)
LYMPHOCYTES # BLD AUTO: 1.3 K/UL (ref 1–4.8)
LYMPHOCYTES NFR BLD: 12.3 % (ref 18–48)
MAGNESIUM SERPL-MCNC: 2.1 MG/DL (ref 1.6–2.6)
MCH RBC QN AUTO: 29.5 PG (ref 27–31)
MCHC RBC AUTO-ENTMCNC: 30.6 G/DL (ref 32–36)
MCV RBC AUTO: 96 FL (ref 82–98)
MONOCYTES # BLD AUTO: 1.2 K/UL (ref 0.3–1)
MONOCYTES NFR BLD: 11.4 % (ref 4–15)
NEUTROPHILS # BLD AUTO: 7.8 K/UL (ref 1.8–7.7)
NEUTROPHILS NFR BLD: 74.6 % (ref 38–73)
NRBC BLD-RTO: 0 /100 WBC
PHOSPHATE SERPL-MCNC: 4.5 MG/DL (ref 2.7–4.5)
PLATELET # BLD AUTO: 453 K/UL (ref 150–350)
PMV BLD AUTO: 11.2 FL (ref 9.2–12.9)
POCT GLUCOSE: 190 MG/DL (ref 70–110)
POCT GLUCOSE: 193 MG/DL (ref 70–110)
POCT GLUCOSE: 206 MG/DL (ref 70–110)
POCT GLUCOSE: 210 MG/DL (ref 70–110)
POTASSIUM SERPL-SCNC: 4.4 MMOL/L (ref 3.5–5.1)
PROT SERPL-MCNC: 9 G/DL (ref 6–8.4)
PROTHROMBIN TIME: 14.6 SEC (ref 9–12.5)
RBC # BLD AUTO: 3.05 M/UL (ref 4–5.4)
SATURATED IRON: 16 % (ref 20–50)
SODIUM SERPL-SCNC: 131 MMOL/L (ref 136–145)
TOTAL IRON BINDING CAPACITY: 237 UG/DL (ref 250–450)
TRANSFERRIN SERPL-MCNC: 160 MG/DL (ref 200–375)
WBC # BLD AUTO: 10.43 K/UL (ref 3.9–12.7)

## 2020-01-29 PROCEDURE — 80053 COMPREHEN METABOLIC PANEL: CPT

## 2020-01-29 PROCEDURE — 84100 ASSAY OF PHOSPHORUS: CPT

## 2020-01-29 PROCEDURE — 94799 UNLISTED PULMONARY SVC/PX: CPT

## 2020-01-29 PROCEDURE — 27000221 HC OXYGEN, UP TO 24 HOURS

## 2020-01-29 PROCEDURE — 85025 COMPLETE CBC W/AUTO DIFF WBC: CPT

## 2020-01-29 PROCEDURE — 25000003 PHARM REV CODE 250: Performed by: STUDENT IN AN ORGANIZED HEALTH CARE EDUCATION/TRAINING PROGRAM

## 2020-01-29 PROCEDURE — 99232 SBSQ HOSP IP/OBS MODERATE 35: CPT | Mod: ,,, | Performed by: NURSE PRACTITIONER

## 2020-01-29 PROCEDURE — 94664 DEMO&/EVAL PT USE INHALER: CPT

## 2020-01-29 PROCEDURE — 85610 PROTHROMBIN TIME: CPT

## 2020-01-29 PROCEDURE — 63600175 PHARM REV CODE 636 W HCPCS: Performed by: STUDENT IN AN ORGANIZED HEALTH CARE EDUCATION/TRAINING PROGRAM

## 2020-01-29 PROCEDURE — 99900035 HC TECH TIME PER 15 MIN (STAT)

## 2020-01-29 PROCEDURE — 83735 ASSAY OF MAGNESIUM: CPT

## 2020-01-29 PROCEDURE — 92526 ORAL FUNCTION THERAPY: CPT

## 2020-01-29 PROCEDURE — 97535 SELF CARE MNGMENT TRAINING: CPT

## 2020-01-29 PROCEDURE — 99232 PR SUBSEQUENT HOSPITAL CARE,LEVL II: ICD-10-PCS | Mod: ,,, | Performed by: NURSE PRACTITIONER

## 2020-01-29 PROCEDURE — 94761 N-INVAS EAR/PLS OXIMETRY MLT: CPT

## 2020-01-29 PROCEDURE — 20600001 HC STEP DOWN PRIVATE ROOM

## 2020-01-29 PROCEDURE — 25000003 PHARM REV CODE 250: Performed by: PHYSICIAN ASSISTANT

## 2020-01-29 PROCEDURE — 25000003 PHARM REV CODE 250: Performed by: NURSE PRACTITIONER

## 2020-01-29 PROCEDURE — 97116 GAIT TRAINING THERAPY: CPT

## 2020-01-29 PROCEDURE — 27000646 HC AEROBIKA DEVICE

## 2020-01-29 PROCEDURE — 97530 THERAPEUTIC ACTIVITIES: CPT

## 2020-01-29 PROCEDURE — 83540 ASSAY OF IRON: CPT

## 2020-01-29 PROCEDURE — 82728 ASSAY OF FERRITIN: CPT

## 2020-01-29 PROCEDURE — 36415 COLL VENOUS BLD VENIPUNCTURE: CPT

## 2020-01-29 PROCEDURE — 63600175 PHARM REV CODE 636 W HCPCS: Performed by: PHYSICIAN ASSISTANT

## 2020-01-29 PROCEDURE — 63600175 PHARM REV CODE 636 W HCPCS: Performed by: NURSE PRACTITIONER

## 2020-01-29 RX ORDER — SODIUM CHLORIDE 9 MG/ML
INJECTION, SOLUTION INTRAVENOUS ONCE
Status: COMPLETED | OUTPATIENT
Start: 2020-01-30 | End: 2020-01-30

## 2020-01-29 RX ORDER — CEFTRIAXONE 1 G/1
1 INJECTION, POWDER, FOR SOLUTION INTRAMUSCULAR; INTRAVENOUS
Status: DISCONTINUED | OUTPATIENT
Start: 2020-01-29 | End: 2020-02-04 | Stop reason: HOSPADM

## 2020-01-29 RX ORDER — INSULIN ASPART 100 [IU]/ML
4 INJECTION, SOLUTION INTRAVENOUS; SUBCUTANEOUS
Status: DISCONTINUED | OUTPATIENT
Start: 2020-01-29 | End: 2020-02-04 | Stop reason: HOSPADM

## 2020-01-29 RX ADMIN — INSULIN ASPART 4 UNITS: 100 INJECTION, SOLUTION INTRAVENOUS; SUBCUTANEOUS at 04:01

## 2020-01-29 RX ADMIN — CEFTRIAXONE SODIUM 1 G: 1 INJECTION, POWDER, FOR SOLUTION INTRAMUSCULAR; INTRAVENOUS at 09:01

## 2020-01-29 RX ADMIN — SEVELAMER CARBONATE 800 MG: 800 TABLET, FILM COATED ORAL at 10:01

## 2020-01-29 RX ADMIN — INSULIN ASPART 4 UNITS: 100 INJECTION, SOLUTION INTRAVENOUS; SUBCUTANEOUS at 11:01

## 2020-01-29 RX ADMIN — PANTOPRAZOLE SODIUM 40 MG: 40 TABLET, DELAYED RELEASE ORAL at 09:01

## 2020-01-29 RX ADMIN — HEPARIN SODIUM 5000 UNITS: 5000 INJECTION, SOLUTION INTRAVENOUS; SUBCUTANEOUS at 05:01

## 2020-01-29 RX ADMIN — SEVELAMER CARBONATE 800 MG: 800 TABLET, FILM COATED ORAL at 04:01

## 2020-01-29 RX ADMIN — ASPIRIN 81 MG CHEWABLE TABLET 81 MG: 81 TABLET CHEWABLE at 10:01

## 2020-01-29 RX ADMIN — CLOPIDOGREL BISULFATE 75 MG: 75 TABLET, FILM COATED ORAL at 10:01

## 2020-01-29 RX ADMIN — INSULIN ASPART 2 UNITS: 100 INJECTION, SOLUTION INTRAVENOUS; SUBCUTANEOUS at 07:01

## 2020-01-29 RX ADMIN — ATORVASTATIN CALCIUM 80 MG: 20 TABLET, FILM COATED ORAL at 09:01

## 2020-01-29 RX ADMIN — INSULIN ASPART 2 UNITS: 100 INJECTION, SOLUTION INTRAVENOUS; SUBCUTANEOUS at 11:01

## 2020-01-29 RX ADMIN — HEPARIN SODIUM 5000 UNITS: 5000 INJECTION, SOLUTION INTRAVENOUS; SUBCUTANEOUS at 03:01

## 2020-01-29 RX ADMIN — CEFTRIAXONE SODIUM 1 G: 1 INJECTION, POWDER, FOR SOLUTION INTRAMUSCULAR; INTRAVENOUS at 11:01

## 2020-01-29 RX ADMIN — ESCITALOPRAM 10 MG: 5 TABLET, FILM COATED ORAL at 10:01

## 2020-01-29 RX ADMIN — METOPROLOL SUCCINATE 50 MG: 50 TABLET, EXTENDED RELEASE ORAL at 10:01

## 2020-01-29 RX ADMIN — POLYETHYLENE GLYCOL 3350 17 G: 17 POWDER, FOR SOLUTION ORAL at 10:01

## 2020-01-29 RX ADMIN — OXYCODONE HYDROCHLORIDE 10 MG: 10 TABLET ORAL at 09:01

## 2020-01-29 RX ADMIN — AMIODARONE HYDROCHLORIDE 400 MG: 200 TABLET ORAL at 03:01

## 2020-01-29 RX ADMIN — SEVELAMER CARBONATE 800 MG: 800 TABLET, FILM COATED ORAL at 11:01

## 2020-01-29 RX ADMIN — PANTOPRAZOLE SODIUM 40 MG: 40 TABLET, DELAYED RELEASE ORAL at 10:01

## 2020-01-29 RX ADMIN — AMIODARONE HYDROCHLORIDE 400 MG: 200 TABLET ORAL at 09:01

## 2020-01-29 RX ADMIN — AMIODARONE HYDROCHLORIDE 400 MG: 200 TABLET ORAL at 10:01

## 2020-01-29 RX ADMIN — HEPARIN SODIUM 5000 UNITS: 5000 INJECTION, SOLUTION INTRAVENOUS; SUBCUTANEOUS at 09:01

## 2020-01-29 RX ADMIN — OXYCODONE HYDROCHLORIDE 10 MG: 10 TABLET ORAL at 11:01

## 2020-01-29 NOTE — PLAN OF CARE
Pt declined by [facility] per Medical Director because she requires closer cardiology follow-up than they are able to provide.  SW sent rehab referral blast to [facility] and [facility].  Under review at [facility].    Declined by [facility] because they have no dialysis beds.    Jenna Bonilla, IKER  [facility]  [phone]

## 2020-01-29 NOTE — NURSING
PT'S   CAME TO THE NURSING STATION  YELLING THAT HE WANTS HIS WIFE BACK IN THE BED.  UNABLE TO EXPLAIN THE RATIONALE WHY SHE SHOULD SIT IN THE CHAIR.  PT'S  WAS UNREASONABLE.  PT COMPLETED THERAPY AND WAS TIRED. ASSISTED PATIENT BACK TO BED PER  REQUEST AND INFORMED HER SHE HAS TO GET UP IN 1HR.  MERY IS AT BEDSIDE DISCUSSING PLAN OF CARE WITH PT AND SPOUSE.  PT'S SPOUSE TONE OF VOICE WAS INAPPROPRIATE FOR STAFF.

## 2020-01-29 NOTE — PLAN OF CARE
Problem: Occupational Therapy Goal  Goal: Occupational Therapy Goal  Description  Goals to be met by: 2/1/20     Patient will increase functional independence with ADLs by performing:    Feeding with [place].  UE Dressing with Minimal Assistance.  LE Dressing with Moderate Assistance.  Grooming while standing at sink with Contact Guard Assistance.  Toileting from toilet with Moderate Assistance for hygiene and clothing management. -updated 1/27/2020  Toilet transfer to toilet with Minimal Assistance. -updated 1/27/2020      Outcome: Ongoing, Progressing    Sisi Alvarez, OTR/L  Pager: [phone]  1/29/2020

## 2020-01-29 NOTE — PT/OT/SLP PROGRESS
"Speech Language Pathology Treatment    Patient Name:  Suyapa Connelly   MRN:  [id number]  Admitting Diagnosis: Acute coronary syndrome    Recommendations:                 General Recommendations:  Cognitive-linguistic evaluation and monitor diet tolerance  Diet recommendations:  Regular, Liquid Diet Level: Thin   Aspiration Precautions: 1 bite/sip at a time, Alternating bites/sips, Avoid talking while eating, Feed only when awake/alert, HOB to 90 degrees, Monitor for s/s of aspiration, Remain upright 30 minutes post meal, Small bites/sips and Strict aspiration precautions   General Precautions: Standard, aspiration, fall, sternal  Communication strategies:  go to room if call light pushed    Subjective   "I get confused sometimes." pt reporting noted cognitive changes.     Pain/Comfort:  · Pain Rating 1: 0/10    Objective:     Has the patient been evaluated by SLP for swallowing?   Yes  Keep patient NPO? No   Current Respiratory Status: nasal cannula      Pt found sitting upright in recliner chair with  present this afternoon.  Lunch tray still present with small portion consumed.  Pt agreeable to take some bites of sausage gumbo and saltine crackers. She also took straw sips of water during PO trials.  Oral management of all consistencies was WFL.  Mild throat clear present x 2 during solid trials when pt spoke while still orally managing PO.  No overt coughing/choking or changes in vocal quality.  SLP discussed advancing diet to regular consistencies, with plan to continue aspiration precautions and monitoring of diet tolerance.  Pt/ expressed understanding and agreement.  Pt expressed noticeable changes in her cognition and periods of confusion.  Education provided to pt regarding cognition, SLP's role in evaluating and addressing cognitive impairments, possible SLP consult once t/f to rehab facility, and plan to request order for SLP cognitive evaluation.  Pt/ expressed understanding and " agreement to participate in cognitive evaluation with SLP.  SLP notified PA with CTS of diet advancement and cognitive eval recommendations.  Verbal order received for cognitive eval order.     Assessment:     Suyapa Connelly is a 54 y.o. female with an SLP diagnosis of improving Dysphagia.  Pt reporting cognitive changes.  Agreeable to participate in SLP cognitive eval.  Order received to complete SLP cognitive eval.     Goals:   Multidisciplinary Problems     SLP Goals        Problem: SLP Goal    Goal Priority Disciplines Outcome   SLP Goal     SLP Ongoing, Progressing   Description:  Speech Language Pathology Goals  Revised goals expected to be met 2/5:  1. Pt will tolerate a regular consistency diet and thin liquids without s/s of aspiration.  2. Pt will participate in speech/language/cognitive evaluation to determine need for cognitive intervention.    Goals expected to be met by 1/29:  1. Pt will tolerate least restrictive diet without s/s of aspiration. Goal met                         Plan:     · Patient to be seen:  4 x/week   · Plan of Care expires:  02/21/20  · Plan of Care reviewed with:  patient, spouse   · SLP Follow-Up:  Yes       Discharge recommendations:  rehabilitation facility     Time Tracking:     SLP Treatment Date:   01/29/20  Speech Start Time:  1356  Speech Stop Time:  1412     Speech Total Time (min):  16 min    Billable Minutes: Treatment Swallowing Dysfunction 8 and Seld Care/Home Management Training 8    MARISOL Castellon, AKBAR-SLP  01/29/2020     MARISOL Castellon, CCC-SLP  Speech Language Pathologist  [phone]  1/29/2020

## 2020-01-29 NOTE — PROGRESS NOTES
[facility]  Cardiothoracic Surgery  Progress Note    Patient Name: Suyapa Connelly  MRN: [id number]  Admission Date: 1/2/2020  Hospital Length of Stay: 27 days  Code Status: Full Code   Attending Physician: Huang Altamirano MD   Referring Provider: Self, Aaareferral  Principal Problem:Acute coronary syndrome            Subjective:     Post-Op Info:  Procedure(s) (LRB):  Insertion, Catheter, Central Venous, Hemodialysis (N/A)   2 Days Post-Op     Interval History: NAEON. NSR on monitor with HR in 60s. Patient up in chair this morning. Worked with PT and upon return to room  insisted that she return to the bed. PT tried to explain that patient needs to be up in chair but he became increasingly angry and yelling that he would put her in bed himself. Charge nurse and myself tried to speak to  but he was still upset. Agreed to let patient remain in bed for 1 hour and then return to chair. Will place surgical bra and start IV cefriaxone as inferior part of incision with small area of purulence/dehiscence.     Review of Systems   Constitution: Positive for malaise/fatigue.   Cardiovascular: Positive for dyspnea on exertion. Negative for chest pain and palpitations.   Respiratory: Negative for shortness of breath.    Hematologic/Lymphatic: Negative for bleeding problem.   Gastrointestinal: Negative for abdominal pain.   Genitourinary: Negative for dysuria.     Medications:  Continuous Infusions:   heparin (porcine)       Scheduled Meds:   sodium chloride 0.9%   Intravenous Once    amiodarone  400 mg Oral TID    aspirin  81 mg Oral Daily    atorvastatin  80 mg Oral QHS    cefTRIAXone (ROCEPHIN) IVPB  1 g Intravenous Q12H    clopidogrel  75 mg Oral Daily    escitalopram oxalate  10 mg Oral Daily    heparin (porcine)  5,000 Units Subcutaneous Q8H    metoprolol succinate  50 mg Oral Daily    pantoprazole  40 mg Oral BID    polyethylene glycol  17 g Oral Daily    scopolamine  1  patch Transdermal Q3 Days    sevelamer carbonate  800 mg Oral TID WM     PRN Meds:acetaminophen, albuterol-ipratropium, bisacodyl, Dextrose 10% Bolus, Dextrose 10% Bolus, glucagon (human recombinant), glucose, glucose, heparin (porcine), heparin (porcine), HYDROmorphone, insulin aspart U-100, metoclopramide HCl, metoprolol, ondansetron, oxyCODONE, oxyCODONE     Objective:     Vital Signs (Most Recent):  Temp: 98.2 °F (36.8 °C) (01/29/20 0719)  Pulse: 62 (01/29/20 0728)  Resp: 16 (01/29/20 0719)  BP: 130/62 (01/29/20 0719)  SpO2: (!) 93 % (01/29/20 0900) Vital Signs (24h Range):  Temp:  [97.6 °F (36.4 °C)-98.4 °F (36.9 °C)] 98.2 °F (36.8 °C)  Pulse:  [] 62  Resp:  [16-18] 16  SpO2:  [92 %-98 %] 93 %  BP: ()/(52-69) 130/62     Weight: 95.3 kg (210 lb 1.6 oz)  Body mass index is 36.06 kg/m².    SpO2: (!) 93 %  O2 Device (Oxygen Therapy): nasal cannula    Intake/Output - Last 3 Shifts       01/27 0700 - 01/28 0659 01/28 0700 - 01/29 0659 01/29 0700 - 01/30 0659    P.O. 0 0     Other  745     Total Intake(mL/kg) 0 (0) 745 (7.8)     Urine (mL/kg/hr)  0 (0)     Other  788     Total Output  788     Net 0 -43            Urine Occurrence  1 x           Lines/Drains/Airways     Central Venous Catheter Line                 Hemodialysis Catheter 01/27/20 1624 left subclavian 1 day          Peripheral Intravenous Line                 Midline Catheter Insertion/Assessment  - Single Lumen 01/21/20 1016 Right basilic vein (medial side of arm) 18g x 10cm 7 days                Physical Exam   Constitutional: She is oriented to person, place, and time. She appears well-developed and well-nourished. No distress.   HENT:   Head: Normocephalic and atraumatic.   Eyes: Pupils are equal, round, and reactive to light. EOM are normal.   Neck: Normal range of motion.   Cardiovascular: Normal rate, regular rhythm and normal pulses.   Pulmonary/Chest: Effort normal. No respiratory distress.   Abdominal: Soft.   Musculoskeletal:  Normal range of motion. She exhibits no edema.   Neurological: She is alert and oriented to person, place, and time.   Skin: Skin is warm, dry and intact. Capillary refill takes less than 2 seconds. She is not diaphoretic. No erythema. No pallor.   Psychiatric: She has a normal mood and affect. Her speech is normal and behavior is normal. Judgment and thought content normal.   Vitals reviewed.      Significant Labs:  BMP:   Recent Labs   Lab 01/29/20  0806   *   *   K 4.4   CL 93*   CO2 24   BUN 31*   CREATININE 4.3*   CALCIUM 9.7   MG 2.1     CBC:   Recent Labs   Lab 01/29/20  0806   WBC 10.43   RBC 3.05*   HGB 9.0*   HCT 29.4*   *   MCV 96   MCH 29.5   MCHC 30.6*       Significant Diagnostics:  I have reviewed all pertinent imaging results/findings within the past 24 hours.    Assessment/Plan:     Paroxysmal atrial fibrillation  Patient went into afib with RVR during HD 1/28 and HR sustained at 117 despite 3 doses of IV metop     Amiodarone increased to 400 TID   PO metop     Acute blood loss anemia  Expected post operatively   CBC daily     S/P CABG x 1  - multimodal pain regimen  - home lexapro  - Extubated 1/21  - CXR daily  - IS  - wean NC  - PO metoprolol and amio 400 TID   - Plavix   - IV Ceftriaxone q12   - Place surgical bra     Weakness  PT/OT    Type 2 diabetes mellitus with hyperglycemia, with long-term current use of insulin  Endocrine following     Mixed hyperlipidemia  statin    ROSLYN (acute kidney injury)  - CRRT discontinued 1/21  - Successful HD trial 1/23, 1/25, 1/28  - HD stopped 1 hour early 1/28 as patient went into afib with RVR   - Made some urine when having a bowel movement   - cont hyponatremia and elevated Cr  - PermaCath 1/27 with vascular sx  - d/c LIJ trialysis    Dispo: CTSU. Awaiting rehab placement       Luisana Jennings PA-C  Cardiothoracic Surgery  [facility]

## 2020-01-29 NOTE — PLAN OF CARE
01/29/20 1335   Post-Acute Status   Post-Acute Authorization Placement   Post-Acute Placement Status Pending Payor Review  (rehab)     Pt accepted by [facility] who submitted for authorization.    Jenna Bonilla LMSW  [facility]  [phone]

## 2020-01-29 NOTE — PROGRESS NOTES
RN called to patient's room while PT/OT was working with patient. Per our protocol for CTS patients, they are to remain up in chair throughout day. Therapy explained to patient that she was supposed to be up in chair. Patient's  became agitated and aggressive towards staff. Patient's  threatened to get patient in bed with or without assistance. Patient has already fallen due to non compliance so therapy placed patient into the bed for safety concerns. Charge RN entered patient's room to assist therapy in explaining what our standard procedures are for the patient.  began raising his voice more, becoming more aggressive, and had threatening approach to RN.RN left patient's room VENANCIO Jennings called to room and patient's  became aggressive with her as well. [place] feelings of being unsafe in room with patient's  were felt by all staff involved. Staff will approach room in [place] shifts while  is at bedside.

## 2020-01-29 NOTE — PROGRESS NOTES
01/29/20 1025 01/29/20 1028   Vital Signs   SpO2 (!) 85 % (!) 92 %   Pulse Oximetry Type Intermittent  --    Flow (L/min)  --  1   O2 Device (Oxygen Therapy) room air nasal cannula   BP (!) 115/55  --    MAP (mmHg) 79  --    BP Location Right arm  --    BP Method Automatic  --    Patient Position Lying  --    CALL INTO PT'S ROOM IN REFERENCE TO  REQUEST - PT COMPLAIN OF SHORTNESS OF BREATH, CHECK PULSE OX WAS 85% ON RA.  APPLIED O2- OXYGEN INCREASE TO 92% ON 1L.

## 2020-01-29 NOTE — PLAN OF CARE
01/29/20 1520   Post-Acute Status   Post-Acute Authorization Placement   Post-Acute Placement Status Pending Post-Acute Clinical Review  (rehab)     SW informed by [facility] that they need to rescind their acceptance because they do not have a dialysis bed available.  CRYSTAL Maldonado to relay this to pt and .  Pt still under review at [facility] and [facility].    Jenna Bonilla, IKER  [facility]  [phone]

## 2020-01-29 NOTE — PROGRESS NOTES
[facility]  Nephrology  Progress Note    Patient Name: Suyapa Connelly  MRN: [id number]  Admission Date: 1/2/2020  Hospital Length of Stay: 27 days  Attending Provider: Huang Altmairano MD   Primary Care Physician: Erlinda Rahman NP  Principal Problem:Acute coronary syndrome    Subjective:     Interval History: HD stopped early yesterday due to afib with RVR. Amiodarone increased to 400 mg per primary team.   Patient with mild complaints of shortness of breath on RA. Remains anuric.     Review of patient's allergies indicates:   Allergen Reactions    Contrast media      Kidney injury    Pcn [penicillins]      Rash; tolerated ceftriaxone on 1/13/20     Current Facility-Administered Medications   Medication Frequency    0.9%  NaCl infusion Once    acetaminophen tablet 650 mg Q6H PRN    albuterol-ipratropium 2.5 mg-0.5 mg/3 mL nebulizer solution 3 mL Q4H PRN    amiodarone tablet 400 mg TID    aspirin chewable tablet 81 mg Daily    atorvastatin tablet 80 mg QHS    bisacodyl suppository 10 mg Q12H PRN    cefTRIAXone injection 1 g Q12H    clopidogrel tablet 75 mg Daily    dextrose 10% (D10W) Bolus PRN    dextrose 10% (D10W) Bolus PRN    escitalopram oxalate tablet 10 mg Daily    glucagon (human recombinant) injection 1 mg PRN    glucose chewable tablet 16 g PRN    glucose chewable tablet 24 g PRN    heparin (porcine) injection 1,000 Units PRN    heparin (porcine) injection 5,000 Units Q8H    heparin infusion 1,000 units/500 ml in 0.9% NaCl (pressure line flush) Continuous PRN    HYDROmorphone injection 0.2 mg Q1H PRN    insulin aspart U-100 pen 0-5 Units QID (AC + HS) PRN    insulin aspart U-100 pen 4 Units TIDWM    metoclopramide HCl injection 10 mg Q6H PRN    metoprolol injection 5 mg Q5 Min PRN    metoprolol succinate (TOPROL-XL) 24 hr tablet 50 mg Daily    ondansetron injection 4 mg Q6H PRN    oxyCODONE immediate release tablet 10 mg Q4H PRN    oxyCODONE immediate  release tablet 5 mg Q4H PRN    pantoprazole EC tablet 40 mg BID    polyethylene glycol packet 17 g Daily    scopolamine 1.3-1.5 mg (1 mg over 3 days) 1 patch Q3 Days    sevelamer carbonate tablet 800 mg TID WM       Objective:     Vital Signs (Most Recent):  Temp: 98.6 °F (37 °C) (01/29/20 1119)  Pulse: 61 (01/29/20 1119)  Resp: 17 (01/29/20 1119)  BP: (!) 141/60 (01/29/20 1119)  SpO2: 97 % (01/29/20 1119)  O2 Device (Oxygen Therapy): nasal cannula (01/29/20 1028) Vital Signs (24h Range):  Temp:  [97.7 °F (36.5 °C)-98.6 °F (37 °C)] 98.6 °F (37 °C)  Pulse:  [] 61  Resp:  [16-18] 17  SpO2:  [85 %-98 %] 97 %  BP: ()/(52-62) 141/60     Weight: 95.3 kg (210 lb 1.6 oz) (01/29/20 0744)  Body mass index is 36.06 kg/m².  Body surface area is 2.07 meters squared.    I/O last 3 completed shifts:  In: 745 [Other:745]  Out: 788 [Other:788]    Physical Exam   Constitutional: She is oriented to person, place, and time. She appears well-developed and well-nourished. She is cooperative. She appears ill. No distress. Nasal cannula in place.   HENT:   Head: Normocephalic and atraumatic.   Right Ear: External ear normal.   Left Ear: External ear normal.   Mouth/Throat: Oropharynx is clear and moist. No oropharyngeal exudate.   Eyes: Right eye exhibits no discharge. Left eye exhibits no discharge. No scleral icterus.   Neck: Neck supple.   Cardiovascular: Normal rate.   No murmur heard.  Pulmonary/Chest: Effort normal. She has no wheezes. She has rales.   Abdominal: Soft. She exhibits no distension and no mass. There is no guarding. No hernia.   Musculoskeletal: Normal range of motion. She exhibits no edema, tenderness or deformity.   Neurological: She is alert and oriented to person, place, and time.   Skin: Skin is warm and dry.       Significant Labs:  CBC:   Recent Labs   Lab 01/29/20  0806   WBC 10.43   RBC 3.05*   HGB 9.0*   HCT 29.4*   *   MCV 96   MCH 29.5   MCHC 30.6*     CMP:   Recent Labs   Lab  01/29/20  0806   *   CALCIUM 9.7   ALBUMIN 2.6*   PROT 9.0*   *   K 4.4   CO2 24   CL 93*   BUN 31*   CREATININE 4.3*   ALKPHOS 253*   ALT 64*   AST 30   BILITOT 1.1*            Assessment/Plan:     * Acute coronary syndrome  - per primary team     ROSLYN (acute kidney injury)  At time of re-consult: Ms Connelly is a 55 y/o female who is s/p CABG. Nephrology re-consulted for ROSLYN, decreased UOP, and concern for volume overload. Patient is now anuric with sCr trending up (4.4 on 1/16/20). Patient with metabolic acidosis on ABG/serum CO2 of 19. Patient now with concern for volume overload and now intubated after progressively increased oxygen requirements. Concern for pulmonary edema on CXR.     Plan/recommendations:   -Will plan for HD tomorrow for metabolic clearance and volume management.   -Renal diet   -Strict I/O's   -Renally dose medications   -Avoid nephrotoxic medications  -Daily renal function panels   -Maintain Hgb >7.0  -Will discuss with Dr. Lange           Thank you for your consult. I will follow-up with patient. Please contact us if you have any additional questions.    Sarah Saba DNP, FNP-C  Nephrology  [facility]

## 2020-01-29 NOTE — PLAN OF CARE
Patient remained free of falls, trauma, injury, and further skin breakdown. VSS- pt remained asymptomatic in SB with a rate of 50-65.  No patient complaints at this time.  Glucose monitored; insulin administered per sliding scale.  Multiple attempts to titrate pt off of O2 via NC; however, pt frequently removes NC and desats to <88%.  Pt currently >93% on 1L NC.  Fall and sternal precautions maintained; pt requires frequent reinforcement.  Avasys at bedside.  Plan of care reviewed; patient verbalized understanding.  Per note- awaiting rehab placement.  All questions and concerns addressed; will continue to monitor.

## 2020-01-29 NOTE — SUBJECTIVE & OBJECTIVE
"Interval HPI:   Overnight events:  BG is now above goal after advance in diet.   Diet Dysphagia Soft (IDDSI Level 6)    Eatin% - 100%  Nausea: No  Hypoglycemia and intervention: No  Fever: No  TPN and/or TF: No  If yes, type of TF/TPN and rate: None    BP (!) 115/55 (BP Location: Right arm, Patient Position: Lying)   Pulse 62   Temp 98.2 °F (36.8 °C) (Oral)   Resp 16   Ht 5' 4" (1.626 m)   Wt 95.3 kg (210 lb 1.6 oz)   LMP 2015 (Exact Date)   SpO2 (!) 92%   Breastfeeding? No   BMI 36.06 kg/m²     Labs Reviewed and Include    Recent Labs   Lab 20  0806   *   CALCIUM 9.7   ALBUMIN 2.6*   PROT 9.0*   *   K 4.4   CO2 24   CL 93*   BUN 31*   CREATININE 4.3*   ALKPHOS 253*   ALT 64*   AST 30   BILITOT 1.1*     Lab Results   Component Value Date    WBC 10.43 2020    HGB 9.0 (L) 2020    HCT 29.4 (L) 2020    MCV 96 2020     (H) 2020     No results for input(s): TSH, FREET4 in the last 168 hours.  Lab Results   Component Value Date    HGBA1C 9.7 (H) 2020       Nutritional status:   Body mass index is 36.06 kg/m².  Lab Results   Component Value Date    ALBUMIN 2.6 (L) 2020    ALBUMIN 2.3 (L) 2020    ALBUMIN 2.4 (L) 2020     No results found for: PREALBUMIN    Estimated Creatinine Clearance: 16.9 mL/min (A) (based on SCr of 4.3 mg/dL (H)).    Accu-Checks  Recent Labs     20  1634 20  2316 20  0741 20  1141 20  2154 20  1053 20  1309 20  1633 20  2311 20  0717   POCTGLUCOSE 271* 167* 147* 133* 175* 119* 196* 256* 205* 206*       Current Medications and/or Treatments Impacting Glycemic Control  Immunotherapy:    Immunosuppressants     None        Steroids:   Hormones (From admission, onward)    None        Pressors:    Autonomic Drugs (From admission, onward)    Start     Stop Route Frequency Ordered    20 1430  succinylcholine (ANECTINE) 20 mg/mL injection     Note " to Pharmacy:  Created by cabinet override    01/17 0244   01/16/20 1430    01/14/20 1853  rocuronium 10 mg/mL injection     Note to Pharmacy:  Created by cabinet override    01/15 0659   01/14/20 1853        Hyperglycemia/Diabetes Medications:   Antihyperglycemics (From admission, onward)    Start     Stop Route Frequency Ordered    01/22/20 1531  insulin aspart U-100 pen 0-5 Units      -- SubQ Before meals & nightly PRN 01/22/20 1431

## 2020-01-29 NOTE — ASSESSMENT & PLAN NOTE
Patient went into afib with RVR during HD 1/28 and HR sustained at 117 despite 3 doses of IV metop     Amiodarone increased to 400 TID   PO metop

## 2020-01-29 NOTE — PROGRESS NOTES
"[facility]  Endocrinology  Progress Note    Admit Date: 2020     Reason for Consult: Management of T2DM, Hyperglycemia     Surgical Procedure and Date: n/a    Diabetes diagnosis year:      Home Diabetes Medications:  Lantus 50 units daily and novolog 9 units with meals   Lab Results   Component Value Date    HGBA1C 9.7 (H) 2020         How often checking glucose at home? 1-3 x day   BG readings on regimen: 100-200 typically; sometimes in 300s   Hypoglycemia on the regimen?  Yes occasionally - usually overnight   Missed doses on regimen?  Yes    Diabetes Complications include:     Hyperglycemia, Hypoglycemia  and Diabetic peripheral neuropathy     Complicating diabetes co morbidities:   History of MI and CHF      HPI:   Patient is a 54 y.o. female with a diagnosis of uncontrolled type 2 DM. Also with CHF, PAD, HTN, HLD, and CAD. Patient presented with chest pain and diaphoresis. Also with nauseated, diaphoretic, a mild headache, epigastric pain, with a vague "heart burn" like feeling in her chest. Patient admitted for treatment and further workup. Endocrinology consulted for BG/ DM management.             Interval HPI:   Overnight events:  BG is now above goal after advance in diet.   Diet Dysphagia Soft (IDDSI Level 6)    Eatin% - 100%  Nausea: No  Hypoglycemia and intervention: No  Fever: No  TPN and/or TF: No  If yes, type of TF/TPN and rate: None    BP (!) 115/55 (BP Location: Right arm, Patient Position: Lying)   Pulse 62   Temp 98.2 °F (36.8 °C) (Oral)   Resp 16   Ht 5' 4" (1.626 m)   Wt 95.3 kg (210 lb 1.6 oz)   LMP 2015 (Exact Date)   SpO2 (!) 92%   Breastfeeding? No   BMI 36.06 kg/m²      Labs Reviewed and Include    Recent Labs   Lab 20  0806   *   CALCIUM 9.7   ALBUMIN 2.6*   PROT 9.0*   *   K 4.4   CO2 24   CL 93*   BUN 31*   CREATININE 4.3*   ALKPHOS 253*   ALT 64*   AST 30   BILITOT 1.1*     Lab Results   Component Value Date    " WBC 10.43 01/29/2020    HGB 9.0 (L) 01/29/2020    HCT 29.4 (L) 01/29/2020    MCV 96 01/29/2020     (H) 01/29/2020     No results for input(s): TSH, FREET4 in the last 168 hours.  Lab Results   Component Value Date    HGBA1C 9.7 (H) 01/03/2020       Nutritional status:   Body mass index is 36.06 kg/m².  Lab Results   Component Value Date    ALBUMIN 2.6 (L) 01/29/2020    ALBUMIN 2.3 (L) 01/28/2020    ALBUMIN 2.4 (L) 01/27/2020     No results found for: PREALBUMIN    Estimated Creatinine Clearance: 16.9 mL/min (A) (based on SCr of 4.3 mg/dL (H)).    Accu-Checks  Recent Labs     01/26/20  1634 01/26/20  2316 01/27/20  0741 01/27/20  1141 01/27/20  2154 01/28/20  1053 01/28/20  1309 01/28/20  1633 01/28/20  2311 01/29/20  0717   POCTGLUCOSE 271* 167* 147* 133* 175* 119* 196* 256* 205* 206*       Current Medications and/or Treatments Impacting Glycemic Control  Immunotherapy:    Immunosuppressants     None        Steroids:   Hormones (From admission, onward)    None        Pressors:    Autonomic Drugs (From admission, onward)    Start     Stop Route Frequency Ordered    01/16/20 1430  succinylcholine (ANECTINE) 20 mg/mL injection     Note to Pharmacy:  Created by cabinet override    01/17 0244   01/16/20 1430    01/14/20 1853  rocuronium 10 mg/mL injection     Note to Pharmacy:  Created by cabinet override    01/15 0659   01/14/20 1853        Hyperglycemia/Diabetes Medications:   Antihyperglycemics (From admission, onward)    Start     Stop Route Frequency Ordered    01/22/20 1531  insulin aspart U-100 pen 0-5 Units      -- SubQ Before meals & nightly PRN 01/22/20 1431          ASSESSMENT and PLAN    * Acute coronary syndrome  Managed per primary team  Avoid hypoglycemia        Type 2 diabetes mellitus with hyperglycemia, with long-term current use of insulin  BG goal 140 - 180   BG is now above goal. Patient on pressure support as noted per chart review, and has had advance in caloric intake. Most likely cause for  "BG excursions. Kidney function remains poor.     Start Levemir 10 units HS. 50% reduction from previous hospital MDI regimen. Once patient demonstrates basal insulin needs. Basal needs most likely being masked by poor kidney function at this time. Hold Levemir for now, patient is at risk for hypoglycemia.   Start Novolog 4 units TIDWM. 50% reduction from previous hospital MDI regimen. Kidney function remains poor.    Low Dose SQ Insulin Correction Scale.  BG Monitoring AC/HS     Will monitor for prandial excursions with diet progression    ** Please call Endocrine for any BG related issues **  ** Please notify Endocrine for any change and/or advance in diet**      Discharge Planning:     TBD. Please notify endocrinology prior to discharge. Patient will need adjustments to home insulin regimen.     Tentative:     Levemir dose TBD    Novolog dose TBD    Low Dose SQ Insulin Correction Scale.    BG Monitoring AC/HS     PRN Insurance preferred diabetes testing supplies    PRN Ultra-Fine Junie Pen Needles 4mm x 32 G (5/32" x 0.23mm).     Patient request outpatient follow-up with [facility] endo clinic for continued DM management.          ROSLYN (acute kidney injury)  Titrate insulin slowly to avoid hypoglycemia as the risk of hypoglycemia increases with decreased creatinine clearance.                Cody Jameson, NP  Endocrinology  [facility]  "

## 2020-01-29 NOTE — PT/OT/SLP PROGRESS
"Occupational Therapy   Treatment    Name: Suyapa Connelly  MRN: [id number]  Admitting Diagnosis:  Acute coronary syndrome  2 Days Post-Op    Recommendations:     Discharge Recommendations: rehabilitation facility  Discharge Equipment Recommendations:  (TBD at next level of care )  Barriers to discharge:  None    Assessment:     Suyapa Connelly is a 54 y.o. female with a medical diagnosis of Acute coronary syndrome.  She presents with performance deficits affecting function are weakness, impaired self care skills, impaired functional mobilty, impaired endurance, gait instability, decreased upper extremity function, decreased lower extremity function, impaired balance, decreased safety awareness, pain, impaired skin, orthopedic precautions.      Pt tolerated session well this date despite pain at sternal incision. Pt's  was present throughout session. Pt's  provided excessive encouragement throughout session towards pt and initially stated at beginning of therapy session, "I'm not yelling at her I'm just tryin to give her some encouragement." Pt did not respond to husbands praise towards her and  showed minimal acknowledgement to . After therapy session, OT/PT encouraged pt to stay UIC majority of the day in order to decrease risk of functional decline and increased OOB sitting tolerance. Pt did report she was tired therefore pt's  demanded pt be assisted back to bed. PT/OT encouraged pt to continue sitting UIC however pt's  interrupting therapit and began yelling in inappropriate manner. Pt's  aggression escalated then became increasingly agitated threatening to assist pt back to bed if therapist would not assist him. PT immediately contacted RN and stated to pt's  that RN would like for pt to stay UIC for majority of the day. When PT and pt's  were out of room talking to charge RN, OT asked pt what she would like to do; Pt stated she would like to return to bed " due to feeling tired but did not want to cause problems. Pt's  and PT re-entered room with pt's  continuing to threatening to transferring pt without staff member help.  At this point, pt's  became more agitated and verbally aggressive yelling at therapist and also yelling in hallway at nursing staff threatening to transfer pt back to bed if a staff member would not help. In a calm manner, PT again instructed pt's  per nursing they prefer pt to stay [facility]. Pt's  began demanding to talk to MD, at this point charge RN arrived to room along with assigned RN and PCT. Per pt's  request pt was assisted back to bed by therapist with Charge RN and assigned RN present.    RN, and Charge RN present for event and notified of initially occurrence of incident and how the situation escalated. Rehab supervisors also notified of event this date.     Rehab Prognosis:  Good; patient would benefit from acute skilled OT services to address these deficits and reach maximum level of function.       Plan:     Patient to be seen 5 x/week to address the above listed problems via self-care/home management, therapeutic activities, therapeutic exercises  · Plan of Care Expires: 02/21/20  · Plan of Care Reviewed with: patient, spouse    Subjective     Pain/Comfort:  · Pain Rating 1: (Pt did not rate numerical value but reported sternal pain )  · Location 1: sternal  · Pain Addressed 1: Reposition, Distraction, Nurse notified  · Pain Rating Post-Intervention 1: 10/10    Objective:     Communicated with: RN (Terry/Bárbara) prior to session.  Patient found up in chair with telemetry upon OT entry to room. Pt agreeable to therapy session. Pt's  present during therapy session. Co-treatment with PT.     General Precautions: Standard, aspiration, fall, sternal   Orthopedic Precautions:N/A   Braces: N/A     Occupational Performance:     Bed Mobility:    · Patient completed Sit to Supine with minimum assistance  for LEs management     Functional Mobility/Transfers:  · Patient completed x3 reps Sit <> Stand Transfer from bedside chair with minimum assistance and of 2 persons  with  no assistive device.   · Cardiac pillow provided to maintain sternal precautions.   · Verbal cues for safety   · Min <>mod A for stability once in standing   · Functional Mobility: Pt engaging in functional mobility to simulate household distances approx 12ft +40ft  with min A using B HHA and chair to follow in order to maximize functional activity tolerance and standing balance required for engagement in occupations of choice.   · Pt completed mobility from bedside chair to sink in bathroom with chair to follow; seated rest break provided after completing hand hygiene standing at sink   · Pt was wheeled into hallway via bedside chair to prevent navigating around obstacles within room.   · During mobility in hallway, pt required verbal cues for upright posture and forward gaze   ·  following with chair and providing encouragement throughout.   · Pt required short bouts of standing rest breaks   · Pt was assisted back to room via bedside chair   · Pt reported 16 RPE after ambulation in hallway  ? After session,  reported to therapy supervisors that PT/OT pushed pt too hard and that he wanted us to stop sooner. Pt or  DID NOT verbalize this during session. Per pt's physical symptoms and therapist clinical judgement, pt was encouraged an appropriate amount to her body tolerance to help her progress with functional mobility and return to her indep PLOF. Pt with poor initiation and will not progress with functional mobility if not appropriately encouraged    Activities of Daily Living:  · Grooming: minimum assistance    · Pt completed hand hygiene at sink with therapist assisting with turning on faucet.   · Verbal cues for maintaining sternal precautions in standing   · Verbal cues for upright posture   · Upper Body Dressing:  moderate assistance   · Donning gown like robe while sitting [facility] 6 Click ADL: 15    Treatment & Education:  - Pt and  educated on role of OT, POC, and goals for therapy.    - Pt verbalized 1/3 sternal precautions requiring assistance to recall pulling and picking up >5lb precautions  - Educated pt on being appropriate to transfer with nsg and PCT with min A x 2 persons   - Extensive time provided for therapeutic counseling and discussion of health disposition with pt and pt's .   - Extensive education provided on the importance of OOB ax's with staff member assistance and sitting OOB majority of day in order to improve OOB sitting tolerance in preparation fo IP Rehab (educated on IP Rehab's expectations for therapy I.e. 3 hours of therapy x5 days/wk.)  - RN and Charge RN notified immediately when pt's  became increasingly agitated and aggressive at end of therapy session as he was demanding pt get back into bed.   - Pt completed ADLs and functional mobility for treatment session as noted above   - Rehab supervisors notified immediately of incident that occurred with pt's  this date.   - Would be beneficial of having 2nd person present in room during therapy session for safety.   - whiteboard updated     Patient left HOB elevated with all lines intact, call button in reach, RN and charge RN  notified and   presentEducation:      GOALS:   Multidisciplinary Problems     Occupational Therapy Goals        Problem: Occupational Therapy Goal    Goal Priority Disciplines Outcome Interventions   Occupational Therapy Goal     OT, PT/OT Ongoing, Progressing    Description:  Goals to be met by: 2/1/20     Patient will increase functional independence with ADLs by performing:    Feeding with [place].  UE Dressing with Minimal Assistance.  LE Dressing with Moderate Assistance.  Grooming while standing at sink with Contact Guard Assistance.  Toileting from toilet with Moderate  Assistance for hygiene and clothing management. -updated 1/27/2020  Toilet transfer to toilet with Minimal Assistance. -updated 1/27/2020                       Time Tracking:     OT Date of Treatment: 01/29/20  OT Start Time: 0948  OT Stop Time: 1030  OT Total Time (min): 42 min    Billable Minutes:Self Care/Home Management 23  Therapeutic Activity 15    Sisi Alvarez, OT  1/29/2020

## 2020-01-29 NOTE — ASSESSMENT & PLAN NOTE
- multimodal pain regimen  - home lexapro  - Extubated 1/21  - CXR daily  - IS  - wean NC  - PO metoprolol and amio 400 TID   - Plavix   - IV Ceftriaxone q12   - Place surgical bra

## 2020-01-29 NOTE — ASSESSMENT & PLAN NOTE
At time of re-consult: Ms Connelly is a 53 y/o female who is s/p CABG. Nephrology re-consulted for ROSLYN, decreased UOP, and concern for volume overload. Patient is now anuric with sCr trending up (4.4 on 1/16/20). Patient with metabolic acidosis on ABG/serum CO2 of 19. Patient now with concern for volume overload and now intubated after progressively increased oxygen requirements. Concern for pulmonary edema on CXR.     Plan/recommendations:   -Will plan for HD tomorrow for metabolic clearance and volume management.   -Renal diet   -Strict I/O's   -Renally dose medications   -Avoid nephrotoxic medications  -Daily renal function panels   -Maintain Hgb >7.0  -Will discuss with Dr. Lange

## 2020-01-29 NOTE — PROGRESS NOTES
Even though I had a conversation with the patient and family member explaining that the patient needs to stay in the chair until after dinner, the patient and family member were adamant about the patient getting in the bed for an hour before dinner. We compromised and the patient will be in bed from now until 1630.

## 2020-01-29 NOTE — ASSESSMENT & PLAN NOTE
- CRRT discontinued 1/21  - Successful HD trial 1/23, 1/25, 1/28  - HD stopped 1 hour early 1/28 as patient went into afib with RVR   - Made some urine when having a bowel movement   - cont hyponatremia and elevated Cr  - PermaCath 1/27 with vascular sx  - d/c J trialysis

## 2020-01-29 NOTE — PLAN OF CARE
Problem: SLP Goal  Goal: SLP Goal  Description  Speech Language Pathology Goals  Revised goals expected to be met 2/5:  1. Pt will tolerate a regular consistency diet and thin liquids without s/s of aspiration.  2. Pt will participate in speech/language/cognitive evaluation to determine need for cognitive intervention.    Goals expected to be met by 1/29:  1. Pt will tolerate least restrictive diet without s/s of aspiration. Goal met        Outcome: Ongoing, Progressing  Pt safe to advance diet to regular consistencies, while continuing strict aspiration precautions and close monitoring of diet tolerance.  Pt aware of cognitive changes.  Agreeable to participate in SLP cognitive eval.  Order requested from primary team.   MARISOL Castellon, CCC-SLP  Speech Language Pathologist  [phone]  1/29/2020

## 2020-01-29 NOTE — PT/OT/SLP PROGRESS
Physical Therapy Treatment    Patient Name:  Suyapa Connelly   MRN:  [id number]    Recommendations:     Discharge Recommendations:  rehabilitation facility   Discharge Equipment Recommendations: (TBD)     Assessment:     Suyapa Connelly is a 54 y.o. female admitted with a medical diagnosis of Acute coronary syndrome.  She presents with the following impairments/functional limitations:  weakness, impaired endurance, impaired self care skills, impaired functional mobilty, gait instability, impaired balance, decreased upper extremity function, pain, impaired cardiopulmonary response to activity.     Pt with good tolerance to session. After session ended. Pt asked if she could be transferred from the chair to the bed. PT told pt that the medical team prefers patients with CTS to remain in the chair throughout the day to assist with breathing, skin integrity, and activity tolerance. Pt called RN to see if she would prefer pt to stay in the chair or get back to bed. Bedside RN reported that she would prefer pt to stay in chair. PT told pt and  that it is preferred that pt stay in chair. Comfort measures offered to pt such as reclining the chair, providing pillows, providing blankets etc. At this time pt's  significantly verbally escalated. Pt's  began yelling stating that if therapy was not going to put her back to bed then he would. He also preceded to say that he knew that we would not allow him to get pt back to bed, so we should get her back to bed now. Pt continues to be verbally escalated and yelling about how he does not understand why we are not doing what he is telling us. PT took extensive time to explain calmly to  that I am following protocol and am trying to do what is medically best for the pt to give her the maximal chance for a good recovery from surgery.  continued to state that he does not care about the protocol and therapy needs to do what he says. Change nurse is now  present in room.  is refusing to listen to charge nurse. To ensure pt safety, PT/OT return pt to bed using a step transfer and following sternal precautions. Once pt safely in bed, charge nurse explains the chair protocol to the , but  dismisses the charge nurse. PT shook husbands hand and reported that we are all on the same team trying to maximize pt's recovery. Once PT left room,  still verbally escalating and reports that the pt is SOB and that she needs oxygen. PT explains that it is not in my scope of practice to give pt oxygen but that I would call the RN.  did not think this was done fast enough and preceded to place a nasal cannula on pt and turn on the O2. Charge RN and bedside RN alerted and was going to address the situation    Camera alerted that session was done    Pt endured no incidents during this treatment session. Charge RN, Bedside RN, and therapy supervisors alerted of this.    Rehab Prognosis: Good; patient would benefit from acute skilled PT services to address these deficits and reach maximum level of function.    Recent Surgery: Procedure(s) (LRB):  Insertion, Catheter, Central Venous, Hemodialysis (N/A) 2 Days Post-Op    Plan:     During this hospitalization, patient to be seen 5 x/week to address the identified rehab impairments via gait training, therapeutic activities, therapeutic exercises, neuromuscular re-education and progress toward the following goals:    · Plan of Care Expires:  02/26/20    Subjective     Chief Complaint: pain, anxiety,  Patient/Family Comments/goals: to get better and return home   Pain/Comfort:  · Pain Rating 1: (sternal pain; unrated)  · Pain Addressed 1: Reposition, Distraction, Nurse notified      Objective:     Communicated with RN prior to session and  present in room.  Patient found up in chair with telemetry upon PT entry to room.     General Precautions: Standard, fall, sternal   Orthopedic Precautions:N/A    Braces: N/A     Functional Mobility:  · Bed Mobility:     · Sit to Supine: total assistance  · Transfers:     · Sit to Stand:  minimum assistance and of 2 persons with no AD from chair x 2 trials   · Gait: 40ft with min A x 2 persons (B HHA)  · Pt demo'd decreased nirav, decreased step length, and forward flexed posture  · SOB present  · No LOB  · RPE 16  ·  did not speak about distance during ambulation trial. PT encouraging pt to make it to the landmark designated. After session,  reported to therapy supervisors that PT/OT pushed pt too hard and that he wanted us to stop sooner. Pt or  DID NOT verbalize this during session. Per pt's physical symptoms and PT's clinical judgement, pt was encouraged an appropriate amount to her body tolerance to help her progress with functional mobility and return to her indep PLOF. Pt with poor initiation and will not progress with functional mobility if not appropriately encouraged.       AM-PAC 6 CLICK MOBILITY  Turning over in bed (including adjusting bedclothes, sheets and blankets)?: 2  Sitting down on and standing up from a chair with arms (e.g., wheelchair, bedside commode, etc.): 2  Moving from lying on back to sitting on the side of the bed?: 2  Moving to and from a bed to a chair (including a wheelchair)?: 2  Need to walk in hospital room?: 3  Climbing 3-5 steps with a railing?: 1  Basic Mobility Total Score: 12       Therapeutic Activities and Exercises:  Educated pt on PT role/POC  Educated pt on importance of OOB activity and daily ambulation   Educated pt on sternal precautions  Pt verbalized understanding     Standing at sink x 3 minutes with CGA to perform ADLs in standing with OT    Patient left HOB elevated with all lines intact, call button in reach and charge RN, bedside RN, and  present..    GOALS:   Multidisciplinary Problems     Physical Therapy Goals        Problem: Physical Therapy Goal    Goal Priority Disciplines Outcome  Goal Variances Interventions   Physical Therapy Goal     PT, PT/OT Ongoing, Progressing     Description:  Goals to be met by: 20    Patient will increase functional independence with mobility by performin. Supine to sit with Contact Guard Assistance -not met  2. Sit to stand transfer with Contact Guard Assistance -not met  3. Gait  x 150 feet with Contact Guard Assistance -not met  4. Ascend/descend 5 stair with left Handrails Contact Guard Assistance -not met                Multidisciplinary Problems (Resolved)        Problem: Physical Therapy Goal    Goal Priority Disciplines Outcome Goal Variances Interventions   Physical Therapy Goal   (Resolved)     PT, PT/OT Met     Description:  Eval only                    Time Tracking:     PT Received On: 20  PT Start Time: 0951     PT Stop Time: 1039  PT Total Time (min): 48 min     Billable Minutes: Gait Training 23 and Therapeutic Activity 15    Treatment Type: Treatment  PT/PTA: PT     PTA Visit Number: 0     Tiana Jain PT, DPT  2020  [phone]

## 2020-01-29 NOTE — SUBJECTIVE & OBJECTIVE
Interval History: HD stopped early yesterday due to afib with RVR. Amiodarone increased to 400 mg per primary team.   Patient with mild complaints of shortness of breath on RA. Remains anuric.     Review of patient's allergies indicates:   Allergen Reactions    Contrast media      Kidney injury    Pcn [penicillins]      Rash; tolerated ceftriaxone on 1/13/20     Current Facility-Administered Medications   Medication Frequency    0.9%  NaCl infusion Once    acetaminophen tablet 650 mg Q6H PRN    albuterol-ipratropium 2.5 mg-0.5 mg/3 mL nebulizer solution 3 mL Q4H PRN    amiodarone tablet 400 mg TID    aspirin chewable tablet 81 mg Daily    atorvastatin tablet 80 mg QHS    bisacodyl suppository 10 mg Q12H PRN    cefTRIAXone injection 1 g Q12H    clopidogrel tablet 75 mg Daily    dextrose 10% (D10W) Bolus PRN    dextrose 10% (D10W) Bolus PRN    escitalopram oxalate tablet 10 mg Daily    glucagon (human recombinant) injection 1 mg PRN    glucose chewable tablet 16 g PRN    glucose chewable tablet 24 g PRN    heparin (porcine) injection 1,000 Units PRN    heparin (porcine) injection 5,000 Units Q8H    heparin infusion 1,000 units/500 ml in 0.9% NaCl (pressure line flush) Continuous PRN    HYDROmorphone injection 0.2 mg Q1H PRN    insulin aspart U-100 pen 0-5 Units QID (AC + HS) PRN    insulin aspart U-100 pen 4 Units TIDWM    metoclopramide HCl injection 10 mg Q6H PRN    metoprolol injection 5 mg Q5 Min PRN    metoprolol succinate (TOPROL-XL) 24 hr tablet 50 mg Daily    ondansetron injection 4 mg Q6H PRN    oxyCODONE immediate release tablet 10 mg Q4H PRN    oxyCODONE immediate release tablet 5 mg Q4H PRN    pantoprazole EC tablet 40 mg BID    polyethylene glycol packet 17 g Daily    scopolamine 1.3-1.5 mg (1 mg over 3 days) 1 patch Q3 Days    sevelamer carbonate tablet 800 mg TID WM       Objective:     Vital Signs (Most Recent):  Temp: 98.6 °F (37 °C) (01/29/20 1119)  Pulse: 61  (01/29/20 1119)  Resp: 17 (01/29/20 1119)  BP: (!) 141/60 (01/29/20 1119)  SpO2: 97 % (01/29/20 1119)  O2 Device (Oxygen Therapy): nasal cannula (01/29/20 1028) Vital Signs (24h Range):  Temp:  [97.7 °F (36.5 °C)-98.6 °F (37 °C)] 98.6 °F (37 °C)  Pulse:  [] 61  Resp:  [16-18] 17  SpO2:  [85 %-98 %] 97 %  BP: ()/(52-62) 141/60     Weight: 95.3 kg (210 lb 1.6 oz) (01/29/20 0744)  Body mass index is 36.06 kg/m².  Body surface area is 2.07 meters squared.    I/O last 3 completed shifts:  In: 745 [Other:745]  Out: 788 [Other:788]    Physical Exam   Constitutional: She is oriented to person, place, and time. She appears well-developed and well-nourished. She is cooperative. She appears ill. No distress. Nasal cannula in place.   HENT:   Head: Normocephalic and atraumatic.   Right Ear: External ear normal.   Left Ear: External ear normal.   Mouth/Throat: Oropharynx is clear and moist. No oropharyngeal exudate.   Eyes: Right eye exhibits no discharge. Left eye exhibits no discharge. No scleral icterus.   Neck: Neck supple.   Cardiovascular: Normal rate.   No murmur heard.  Pulmonary/Chest: Effort normal. She has no wheezes. She has rales.   Abdominal: Soft. She exhibits no distension and no mass. There is no guarding. No hernia.   Musculoskeletal: Normal range of motion. She exhibits no edema, tenderness or deformity.   Neurological: She is alert and oriented to person, place, and time.   Skin: Skin is warm and dry.       Significant Labs:  CBC:   Recent Labs   Lab 01/29/20  0806   WBC 10.43   RBC 3.05*   HGB 9.0*   HCT 29.4*   *   MCV 96   MCH 29.5   MCHC 30.6*     CMP:   Recent Labs   Lab 01/29/20  0806   *   CALCIUM 9.7   ALBUMIN 2.6*   PROT 9.0*   *   K 4.4   CO2 24   CL 93*   BUN 31*   CREATININE 4.3*   ALKPHOS 253*   ALT 64*   AST 30   BILITOT 1.1*

## 2020-01-29 NOTE — ASSESSMENT & PLAN NOTE
"BG goal 140 - 180   BG is now above goal. Patient on pressure support as noted per chart review, and has had advance in caloric intake. Most likely cause for BG excursions. Kidney function remains poor.     Start Levemir 10 units HS. 50% reduction from previous hospital MDI regimen. Once patient demonstrates basal insulin needs. Basal needs most likely being masked by poor kidney function at this time. Hold Levemir for now, patient is at risk for hypoglycemia.   Start Novolog 4 units TIDWM. 50% reduction from previous hospital MDI regimen. Kidney function remains poor.    Low Dose SQ Insulin Correction Scale.  BG Monitoring AC/HS     Will monitor for prandial excursions with diet progression    ** Please call Endocrine for any BG related issues **  ** Please notify Endocrine for any change and/or advance in diet**      Discharge Planning:     TBD. Please notify endocrinology prior to discharge. Patient will need adjustments to home insulin regimen.     Tentative:     Levemir dose TBD    Novolog dose TBD    Low Dose SQ Insulin Correction Scale.    BG Monitoring AC/HS     PRN Insurance preferred diabetes testing supplies    PRN Ultra-Fine Junie Pen Needles 4mm x 32 G (5/32" x 0.23mm).     Patient request outpatient follow-up with [facility] endo clinic for continued DM management.        "

## 2020-01-30 LAB
ALBUMIN SERPL BCP-MCNC: 2.6 G/DL (ref 3.5–5.2)
ALP SERPL-CCNC: 225 U/L (ref 55–135)
ALT SERPL W/O P-5'-P-CCNC: 57 U/L (ref 10–44)
ANION GAP SERPL CALC-SCNC: 13 MMOL/L (ref 8–16)
AST SERPL-CCNC: 29 U/L (ref 10–40)
BASOPHILS # BLD AUTO: 0.07 K/UL (ref 0–0.2)
BASOPHILS NFR BLD: 0.6 % (ref 0–1.9)
BILIRUB SERPL-MCNC: 0.8 MG/DL (ref 0.1–1)
BUN SERPL-MCNC: 37 MG/DL (ref 6–20)
CALCIUM SERPL-MCNC: 9.7 MG/DL (ref 8.7–10.5)
CHLORIDE SERPL-SCNC: 92 MMOL/L (ref 95–110)
CO2 SERPL-SCNC: 28 MMOL/L (ref 23–29)
CREAT SERPL-MCNC: 5.4 MG/DL (ref 0.5–1.4)
DIFFERENTIAL METHOD: ABNORMAL
EOSINOPHIL # BLD AUTO: 0.2 K/UL (ref 0–0.5)
EOSINOPHIL NFR BLD: 1.4 % (ref 0–8)
ERYTHROCYTE [DISTWIDTH] IN BLOOD BY AUTOMATED COUNT: 15.9 % (ref 11.5–14.5)
EST. GFR  (AFRICAN AMERICAN): 9.7 ML/MIN/1.73 M^2
EST. GFR  (NON AFRICAN AMERICAN): 8.4 ML/MIN/1.73 M^2
GLUCOSE SERPL-MCNC: 184 MG/DL (ref 70–110)
HCT VFR BLD AUTO: 28.4 % (ref 37–48.5)
HGB BLD-MCNC: 8.7 G/DL (ref 12–16)
IMM GRANULOCYTES # BLD AUTO: 0.05 K/UL (ref 0–0.04)
IMM GRANULOCYTES NFR BLD AUTO: 0.4 % (ref 0–0.5)
INR PPP: 1.6 (ref 0.8–1.2)
LYMPHOCYTES # BLD AUTO: 1.5 K/UL (ref 1–4.8)
LYMPHOCYTES NFR BLD: 13.7 % (ref 18–48)
MAGNESIUM SERPL-MCNC: 2.2 MG/DL (ref 1.6–2.6)
MCH RBC QN AUTO: 29.4 PG (ref 27–31)
MCHC RBC AUTO-ENTMCNC: 30.6 G/DL (ref 32–36)
MCV RBC AUTO: 96 FL (ref 82–98)
MONOCYTES # BLD AUTO: 1.9 K/UL (ref 0.3–1)
MONOCYTES NFR BLD: 16.5 % (ref 4–15)
NEUTROPHILS # BLD AUTO: 7.5 K/UL (ref 1.8–7.7)
NEUTROPHILS NFR BLD: 67.4 % (ref 38–73)
NRBC BLD-RTO: 0 /100 WBC
PHOSPHATE SERPL-MCNC: 5.2 MG/DL (ref 2.7–4.5)
PLATELET # BLD AUTO: 488 K/UL (ref 150–350)
PMV BLD AUTO: 11.2 FL (ref 9.2–12.9)
POCT GLUCOSE: 140 MG/DL (ref 70–110)
POCT GLUCOSE: 164 MG/DL (ref 70–110)
POCT GLUCOSE: 204 MG/DL (ref 70–110)
POTASSIUM SERPL-SCNC: 5 MMOL/L (ref 3.5–5.1)
PROT SERPL-MCNC: 8.9 G/DL (ref 6–8.4)
PROTHROMBIN TIME: 16 SEC (ref 9–12.5)
RBC # BLD AUTO: 2.96 M/UL (ref 4–5.4)
SODIUM SERPL-SCNC: 133 MMOL/L (ref 136–145)
WBC # BLD AUTO: 11.19 K/UL (ref 3.9–12.7)

## 2020-01-30 PROCEDURE — 25000003 PHARM REV CODE 250: Performed by: STUDENT IN AN ORGANIZED HEALTH CARE EDUCATION/TRAINING PROGRAM

## 2020-01-30 PROCEDURE — 90935 HEMODIALYSIS ONE EVALUATION: CPT

## 2020-01-30 PROCEDURE — 27000221 HC OXYGEN, UP TO 24 HOURS

## 2020-01-30 PROCEDURE — 83735 ASSAY OF MAGNESIUM: CPT

## 2020-01-30 PROCEDURE — 36415 COLL VENOUS BLD VENIPUNCTURE: CPT

## 2020-01-30 PROCEDURE — 99900035 HC TECH TIME PER 15 MIN (STAT)

## 2020-01-30 PROCEDURE — 80053 COMPREHEN METABOLIC PANEL: CPT

## 2020-01-30 PROCEDURE — 94799 UNLISTED PULMONARY SVC/PX: CPT

## 2020-01-30 PROCEDURE — 85025 COMPLETE CBC W/AUTO DIFF WBC: CPT

## 2020-01-30 PROCEDURE — 63600175 PHARM REV CODE 636 W HCPCS: Performed by: NURSE PRACTITIONER

## 2020-01-30 PROCEDURE — 97530 THERAPEUTIC ACTIVITIES: CPT

## 2020-01-30 PROCEDURE — 63600175 PHARM REV CODE 636 W HCPCS: Performed by: STUDENT IN AN ORGANIZED HEALTH CARE EDUCATION/TRAINING PROGRAM

## 2020-01-30 PROCEDURE — 90935 HEMODIALYSIS ONE EVALUATION: CPT | Mod: ,,, | Performed by: NURSE PRACTITIONER

## 2020-01-30 PROCEDURE — 63600175 PHARM REV CODE 636 W HCPCS: Performed by: PHYSICIAN ASSISTANT

## 2020-01-30 PROCEDURE — 25000003 PHARM REV CODE 250: Performed by: NURSE PRACTITIONER

## 2020-01-30 PROCEDURE — 20600001 HC STEP DOWN PRIVATE ROOM

## 2020-01-30 PROCEDURE — 94664 DEMO&/EVAL PT USE INHALER: CPT

## 2020-01-30 PROCEDURE — 94761 N-INVAS EAR/PLS OXIMETRY MLT: CPT

## 2020-01-30 PROCEDURE — 85610 PROTHROMBIN TIME: CPT

## 2020-01-30 PROCEDURE — 84100 ASSAY OF PHOSPHORUS: CPT

## 2020-01-30 PROCEDURE — 25000003 PHARM REV CODE 250: Performed by: PHYSICIAN ASSISTANT

## 2020-01-30 PROCEDURE — 90935 PR HEMODIALYSIS, ONE EVALUATION: ICD-10-PCS | Mod: ,,, | Performed by: NURSE PRACTITIONER

## 2020-01-30 PROCEDURE — 97535 SELF CARE MNGMENT TRAINING: CPT

## 2020-01-30 RX ADMIN — ASPIRIN 81 MG CHEWABLE TABLET 81 MG: 81 TABLET CHEWABLE at 01:01

## 2020-01-30 RX ADMIN — PANTOPRAZOLE SODIUM 40 MG: 40 TABLET, DELAYED RELEASE ORAL at 10:01

## 2020-01-30 RX ADMIN — HEPARIN SODIUM 5000 UNITS: 5000 INJECTION, SOLUTION INTRAVENOUS; SUBCUTANEOUS at 05:01

## 2020-01-30 RX ADMIN — SEVELAMER CARBONATE 800 MG: 800 TABLET, FILM COATED ORAL at 04:01

## 2020-01-30 RX ADMIN — AMIODARONE HYDROCHLORIDE 400 MG: 200 TABLET ORAL at 03:01

## 2020-01-30 RX ADMIN — ATORVASTATIN CALCIUM 80 MG: 20 TABLET, FILM COATED ORAL at 10:01

## 2020-01-30 RX ADMIN — SEVELAMER CARBONATE 800 MG: 800 TABLET, FILM COATED ORAL at 01:01

## 2020-01-30 RX ADMIN — HEPARIN SODIUM 5000 UNITS: 5000 INJECTION, SOLUTION INTRAVENOUS; SUBCUTANEOUS at 10:01

## 2020-01-30 RX ADMIN — POLYETHYLENE GLYCOL 3350 17 G: 17 POWDER, FOR SOLUTION ORAL at 01:01

## 2020-01-30 RX ADMIN — EPOETIN ALFA-EPBX 4000 UNITS: 4000 INJECTION, SOLUTION INTRAVENOUS; SUBCUTANEOUS at 09:01

## 2020-01-30 RX ADMIN — SODIUM CHLORIDE 350 ML: 0.9 INJECTION, SOLUTION INTRAVENOUS at 09:01

## 2020-01-30 RX ADMIN — CLOPIDOGREL BISULFATE 75 MG: 75 TABLET, FILM COATED ORAL at 01:01

## 2020-01-30 RX ADMIN — SCOPALAMINE 1 PATCH: 1 PATCH, EXTENDED RELEASE TRANSDERMAL at 01:01

## 2020-01-30 RX ADMIN — INSULIN ASPART 4 UNITS: 100 INJECTION, SOLUTION INTRAVENOUS; SUBCUTANEOUS at 04:01

## 2020-01-30 RX ADMIN — AMIODARONE HYDROCHLORIDE 400 MG: 200 TABLET ORAL at 08:01

## 2020-01-30 RX ADMIN — CEFTRIAXONE SODIUM 1 G: 1 INJECTION, POWDER, FOR SOLUTION INTRAMUSCULAR; INTRAVENOUS at 10:01

## 2020-01-30 RX ADMIN — AMIODARONE HYDROCHLORIDE 400 MG: 200 TABLET ORAL at 10:01

## 2020-01-30 RX ADMIN — HEPARIN SODIUM 1000 UNITS: 1000 INJECTION, SOLUTION INTRAVENOUS; SUBCUTANEOUS at 12:01

## 2020-01-30 RX ADMIN — HEPARIN SODIUM 5000 UNITS: 5000 INJECTION, SOLUTION INTRAVENOUS; SUBCUTANEOUS at 01:01

## 2020-01-30 RX ADMIN — METOPROLOL SUCCINATE 50 MG: 50 TABLET, EXTENDED RELEASE ORAL at 01:01

## 2020-01-30 RX ADMIN — PANTOPRAZOLE SODIUM 40 MG: 40 TABLET, DELAYED RELEASE ORAL at 01:01

## 2020-01-30 RX ADMIN — CEFTRIAXONE SODIUM 1 G: 1 INJECTION, POWDER, FOR SOLUTION INTRAMUSCULAR; INTRAVENOUS at 01:01

## 2020-01-30 RX ADMIN — INSULIN ASPART 2 UNITS: 100 INJECTION, SOLUTION INTRAVENOUS; SUBCUTANEOUS at 08:01

## 2020-01-30 RX ADMIN — ESCITALOPRAM 10 MG: 5 TABLET, FILM COATED ORAL at 01:01

## 2020-01-30 NOTE — PLAN OF CARE
Pt remained free of injuries, falls, and trauma. VSS. Pt c/o incisional pain, PRN pain medication administered. Glucose monitored, 190. No SSI needed. Scheduled antibitic administered. Sternal precautions, IS, and ambulation encouraged. Fall precautions maintained. Tele-sitter at bedside. Plan of care reviewed with pt. Pt verbalized understanding. All questions and concerns addressed. No complaints mentioned at this time. Will continue to monitor.

## 2020-01-30 NOTE — PT/OT/SLP PROGRESS
Occupational Therapy   Treatment    Name: Suyapa Connelly  MRN: [id number]  Admitting Diagnosis:  Acute coronary syndrome  3 Days Post-Op    Recommendations:     Discharge Recommendations: rehabilitation facility  Discharge Equipment Recommendations:  (TBD at next level of care )  Barriers to discharge:  None    Assessment:     Suyapa Connelly is a 54 y.o. female with a medical diagnosis of Acute coronary syndrome.  She presents with performance deficits affecting function are weakness, gait instability, impaired endurance, impaired balance, impaired self care skills, decreased lower extremity function, decreased upper extremity function, impaired functional mobilty, decreased safety awareness, impaired cardiopulmonary response to activity, pain. Pt tolerated session well despite pain and fatigue from dialysis in AM. Pt attempted to be seen at 1315 after returning to room from dialysis and RN reuqested for therapy to return later in PM due to nursing care. OT returned at 1435 and pt's  present in room with pt asleep in bed. Pt's  initially refused for therapy to work with pt due to pt being fatigued from dialysis in AM and contacted the Rehab office to decline therapy this date for pt.OT/PT was not notified that he called until post therapy session with pt this date.  OT expressed to pt's  the importance of OOB mobility despite having dialysis in AM. Pt's  then agreeable to allow therapy to work with pt. Pt was agreeable to ambulate to sink and complete grooming tasks. Pt completed self-care at sink in standing with CGA and chair behind pt for seated rest break if needed. After standing at sink for ~8 mins pt required sitting rest break in chair. Pt then ambulated back to bed with min A using requiring B HHA. Pt's  agreeable to 9am therapy session on 1/31/2020. Pt would benefit from continued skilled acute OT services in order to maximize independence and safety with ADLs and  functional mobility to ensure safe return to PLOF in the least restrictive environment. Pt is an appropriate candidate for IP Rehab in order to improve overall functional independence with self-care tasks and functional mobility.      Rehab Prognosis:  Good; patient would benefit from acute skilled OT services to address these deficits and reach maximum level of function.       Plan:     Patient to be seen 5 x/week to address the above listed problems via self-care/home management, therapeutic exercises, therapeutic activities  · Plan of Care Expires: 02/21/20  · Plan of Care Reviewed with: patient, spouse    Subjective     Pain/Comfort:  Pain Rating 1: (Pt reported sternal pain but did not rate )  Pain Addressed 1: Reposition, Distraction, Cessation of Activity    Objective:     Communicated with: RN prior to session.  Patient found left sidelying with telemetry upon OT entry to room. Pt's  present in room and initially refused for therapy to work with pt. After discussing importance of OOB ax to pt's  he then became agreeable to therapy working with pt. Pt agreeable to therapy session despite fatigue.     General Precautions: Standard, aspiration, fall, sternal   Orthopedic Precautions:N/A   Braces: N/A     Occupational Performance:     Bed Mobility:    · Patient completed Supine to Sit with minimum assistance and for trunk elevation and maintaining sternal precautions   · Patient completed Sit to Supine with minimum assistance for leg lift on R LE    Functional Mobility/Transfers:  · Patient completed x 2 reps Sit <> Stand Transfer from EOB and bedside chair  with minimum assistance and of 2 persons  with  no assistive device   · Max verbal cues for maintaining sternal precautions.   · Functional Mobility: Pt engaging in functional mobility to simulate household distances within pt's room (EOB <> sink within room)  with min A using B HHA  in order to maximize functional activity tolerance and  standing balance required for engagement in occupations of choice.   · Pt with no overt LOB but mild gait instability   · Verbal cues for upright posture and forward gaze.   · B HHA      Activities of Daily Living:  · Grooming: contact guard assistance     · Pt completed oral care and hand hygiene while standing at the sink with CGA for standing balance.   · Pt continues to demo non-compliance maintaining sternal precautions despite verbal cues as pt was observed bearing down on L UE while completing oral care   · Pt required sitting rest break in bedside chair due to fatigue   · Pt tolerated standing at sink for ~8 mins     [facility] 6 Click ADL: 16    Treatment & Education:  - Pt and   educated on role of OT, POC, and goals for therapy.    Pt and pt's  educated on IP Rehab expectations   - Pt required assistance to recall all 3 sternal precautions. Pt continues to demo non-compliance with maintaining sternal precautions   - RN notified to please assist pt [facility]IC for all meals   - Time provided for therapeutic counseling and discussion of health disposition.   - Importance of OOB ax's with staff member assistance and sitting OOB majority of day.   - Pt completed ADLs and functional mobility for treatment session as noted above   - Pt verbalized understanding. Pt expressed no further concerns/questions.  - whiteboard updated     Patient left left sidelying with all lines intact, call button in reach, RN notified and   presentEducation:      GOALS:   Multidisciplinary Problems     Occupational Therapy Goals        Problem: Occupational Therapy Goal    Goal Priority Disciplines Outcome Interventions   Occupational Therapy Goal     OT, PT/OT Ongoing, Progressing    Description:  Goals to be met by: 2/1/20     Patient will increase functional independence with ADLs by performing:    Feeding with [place].  UE Dressing with Minimal Assistance.  LE Dressing with Moderate Assistance.  Grooming while  standing at sink with Contact Guard Assistance. - progressing towards   Toileting from toilet with Moderate Assistance for hygiene and clothing management. -updated 1/27/2020  Toilet transfer to toilet with Minimal Assistance. -updated 1/27/2020                        Time Tracking:     OT Date of Treatment: 01/30/20  OT Start Time: 1435  OT Stop Time: 1501  OT Total Time (min): 26 min    Billable Minutes:Self Care/Home Management 23    Sisi Alvarez, OT  1/30/2020

## 2020-01-30 NOTE — PLAN OF CARE
01/30/20 1428   Post-Acute Status   Post-Acute Authorization Placement   Post-Acute Placement Status Pending Payor Review  (rehab)     SW informed by Jen DUNCAN that they are submitting for authorization.    Jenna Bonilla LMSW  [facility]  [phone]

## 2020-01-30 NOTE — PLAN OF CARE
SW informed by Geovanna at [facility] re[facility] that they will look at pt's case again but that their medical director did not think that pt is medically stable for rehab.    Erika from [facility] to come see pt later today after dialysis.    Jenna Bonilla, IKER  [facility]  [phone]

## 2020-01-30 NOTE — PLAN OF CARE
Discharge Recommendation: Rehab.    0 goals met today. PT goals appropriate.    Courtney Meier PT, DPT  2020  Pager: [phone]        Problem: Physical Therapy Goal  Goal: Physical Therapy Goal  Description  Goals to be met by: 20    Patient will increase functional independence with mobility by performin. Supine to sit with Contact Guard Assistance -not met  2. Sit to stand transfer with Contact Guard Assistance -not met  3. Gait  x 150 feet with Contact Guard Assistance -not met  4. Ascend/descend 5 stair with left Handrails Contact Guard Assistance -not met     Outcome: Ongoing, Progressing

## 2020-01-30 NOTE — ASSESSMENT & PLAN NOTE
- CRRT discontinued 1/21  - Successful HD trial 1/23, 1/25, 1/28, 1/30  - HD stopped 1 hour early 1/28 as patient went into afib with RVR   - Making small amount of urine   - cont hyponatremia and elevated Cr  - PermaCath 1/27 with vascular sx  - d/c J trialysis

## 2020-01-30 NOTE — SUBJECTIVE & OBJECTIVE
Interval History: NAEON. Pt went for HD today. Will keep gauze dressing over sternotomy. Keep surgical bra in place. Feels tired after HD but was getting up to work with PT.     Review of Systems   Constitution: Positive for malaise/fatigue.   Cardiovascular: Negative for chest pain and palpitations.   Respiratory: Positive for shortness of breath.    Hematologic/Lymphatic: Negative for bleeding problem.     Medications:  Continuous Infusions:   heparin (porcine)       Scheduled Meds:   sodium chloride 0.9%   Intravenous Once    amiodarone  400 mg Oral TID    aspirin  81 mg Oral Daily    atorvastatin  80 mg Oral QHS    cefTRIAXone (ROCEPHIN) IVPB  1 g Intravenous Q12H    clopidogrel  75 mg Oral Daily    epoetin nina-ebpx (RETACRIT) injection  4,000 Units Intravenous Every Tues, Thurs, Sat    escitalopram oxalate  10 mg Oral Daily    heparin (porcine)  5,000 Units Subcutaneous Q8H    insulin aspart U-100  4 Units Subcutaneous TIDWM    metoprolol succinate  50 mg Oral Daily    pantoprazole  40 mg Oral BID    polyethylene glycol  17 g Oral Daily    scopolamine  1 patch Transdermal Q3 Days    sevelamer carbonate  800 mg Oral TID WM     PRN Meds:acetaminophen, albuterol-ipratropium, bisacodyl, Dextrose 10% Bolus, Dextrose 10% Bolus, glucagon (human recombinant), glucose, glucose, heparin (porcine), heparin (porcine), HYDROmorphone, insulin aspart U-100, metoclopramide HCl, metoprolol, ondansetron, oxyCODONE, oxyCODONE     Objective:     Vital Signs (Most Recent):  Temp: 97.7 °F (36.5 °C) (01/30/20 1317)  Pulse: 63 (01/30/20 1317)  Resp: 16 (01/30/20 1317)  BP: (!) 121/58 (01/30/20 1317)  SpO2: 99 % (01/30/20 1317) Vital Signs (24h Range):  Temp:  [97.3 °F (36.3 °C)-98.2 °F (36.8 °C)] 97.7 °F (36.5 °C)  Pulse:  [56-65] 63  Resp:  [15-18] 16  SpO2:  [94 %-99 %] 99 %  BP: ()/(45-94) 121/58     Weight: 96.1 kg (211 lb 13.8 oz)  Body mass index is 36.37 kg/m².    SpO2: 99 %  O2 Device (Oxygen Therapy):  nasal cannula    Intake/Output - Last 3 Shifts       01/28 0700 - 01/29 0659 01/29 0700 - 01/30 0659 01/30 0700 - 01/31 0659    P.O. 0 780 240    Other 745  650    Total Intake(mL/kg) 745 (7.8) 780 (8.1) 890 (9.3)    Urine (mL/kg/hr) 0 (0) 0 (0) 0 (0)    Other 788  3150    Stool   0    Total Output 788 0 3150    Net -43 +780 -2260           Urine Occurrence 1 x 2 x 0 x    Stool Occurrence  0 x 0 x          Lines/Drains/Airways     Central Venous Catheter Line                 Hemodialysis Catheter 01/27/20 1624 left subclavian 2 days          Peripheral Intravenous Line                 Midline Catheter Insertion/Assessment  - Single Lumen 01/21/20 1016 Right basilic vein (medial side of arm) 18g x 10cm 9 days                Physical Exam   Constitutional: She is oriented to person, place, and time. She appears well-developed and well-nourished. No distress.   HENT:   Head: Normocephalic and atraumatic.   Eyes: Pupils are equal, round, and reactive to light. EOM are normal.   Neck: Normal range of motion. No JVD present.   Cardiovascular: Normal rate, regular rhythm and normal pulses.   Intermittent bradycardia    Pulmonary/Chest: Effort normal. No respiratory distress.   3L nasal cannula    Abdominal: Soft. She exhibits no distension.   Musculoskeletal: Normal range of motion.   Neurological: She is alert and oriented to person, place, and time.   Skin: Skin is warm, dry and intact. Capillary refill takes less than 2 seconds. She is not diaphoretic.   Midline sternal incision with some areas of drainage   Psychiatric: She has a normal mood and affect. Her speech is normal and behavior is normal. Judgment and thought content normal.   Vitals reviewed.      Significant Labs:  BMP:   Recent Labs   Lab 01/30/20  0433   *   *   K 5.0   CL 92*   CO2 28   BUN 37*   CREATININE 5.4*   CALCIUM 9.7   MG 2.2     CBC:   Recent Labs   Lab 01/30/20  0433   WBC 11.19   RBC 2.96*   HGB 8.7*   HCT 28.4*   *   MCV  96   [facility] 29.4   [facility] 30.6*       Significant Diagnostics:  I have reviewed all pertinent imaging results/findings within the past 24 hours.

## 2020-01-30 NOTE — PLAN OF CARE
HD completed, 3.5hr tx with 2.5L fluid removed and pt tolerated well. Blood returned,heparinized,capped and secured. Report given to Anna.

## 2020-01-30 NOTE — PT/OT/SLP PROGRESS
"Physical Therapy Treatment    Patient Name:  Suyapa Connelly   MRN:  [id number]  Admitting Diagnosis:  Acute coronary syndrome   Recent Surgery: Procedure(s) (LRB):  Insertion, Catheter, Central Venous, Hemodialysis (N/A) 3 Days Post-Op  Admit Date: 1/2/2020  Length of Stay: 28 days    Recommendations:     Discharge Recommendations:  rehabilitation facility   Discharge Equipment Recommendations: other (see comments)(tbd)   Barriers to discharge: None    Assessment:     Suyapa Connelly is a 54 y.o. female admitted with a medical diagnosis of Acute coronary syndrome.  She presents with the following impairments/functional limitations:  weakness, impaired endurance, impaired self care skills, impaired functional mobilty, gait instability, impaired balance, decreased upper extremity function, decreased lower extremity function, pain, decreased safety awareness, impaired cardiopulmonary response to activity. Pt tolerated session well today despite being extremely fatigued from dialysis in AM. Upon initial attempt for treatment session pt's RN declined due to nursing care and patient fatigue. Pt and  were informed that PT/OT would attempt for another therapy session at approx 1430. Upon entrance into pt room, pt was found sleeping with all lights off. Pt's  told PT/OT that he called rehab office and left a message for therapists to not return on this date due to patient fatigue. PT/OT expressed understanding and apologized that the message had not been relayed yet. Pt's  was educated that although pt was fatigued from dialysis OOB mobility was importance and that pt would still have to participate in PT/OT sessions at [facility] after dialysis. Pt's  then stated "You know what? Let's get her up." and was agreeable to have therapy attempt session with pt. Pt was agreeable to session but had minimal eye contact with therapists or  throughout session and was responding minimally to questions. Pt was " able to ambulate to sink, approx 10 ft, with CGA and HHA x 2 and perform a static standing balance task at sink for 8 minutes. Close chair follow provided throughout session for patient safety. Pt req'd a seated rest break due to fatigue. Pt's  attempted to encourage patient to complete mobility throughout session and to ambulate back to bed. Pt's , pt, and therapists agreed to 9 am therapy session on 1/31/2020. Pt is an excellent candidate for inpatient rehabilitation, as pt has experienced decline from PLOF, has good family support, and is motivated to participate in therapy.    Rehab Prognosis: Good; patient would benefit from acute skilled PT services to address these deficits and reach maximum level of function.    Recent Surgery: Procedure(s) (LRB):  Insertion, Catheter, Central Venous, Hemodialysis (N/A) 3 Days Post-Op    Plan:     During this hospitalization, patient to be seen 5 x/week to address the identified rehab impairments via gait training, therapeutic activities, therapeutic exercises, neuromuscular re-education and progress toward the following goals:    · Plan of Care Expires:  02/26/20    Subjective     RN notified prior to session.  present upon PT entrance into room.    Chief Complaint: Per  speaking for patient: Pt was fatigued from dialysis and initially requested PT/OT to return tomorrow. After discussing importance of OOB ambulation pt allowed PT/OT to attempt session.  Patient/Family Comments/goals: get better per   Pain/Comfort:  · Pain Rating 1: other (see comments)(did not rate)  · Location - Side 1: Bilateral  · Location - Orientation 1: midline  · Location 1: sternal  · Pain Addressed 1: Reposition, Distraction, Cessation of Activity  · Pain Rating Post-Intervention 1: other (see comments)(did not rate)      Objective:     Additional staff present: OT for co-treatment due to pt's extremely limited cardiopulmonary reserve; PT student present for  observation and assistance prn    Patient found left sidelying with: telemetry, oxygen   Mental Status: Patient is oriented to AxOx4 and follows multi-step commands. Patient is Lethargic, Cooperative and Flat affect during session.    General Precautions: Standard, Cardiac aspiration, fall, sternal   Orthopedic Precautions:N/A   Braces: N/A   Body mass index is 36.37 kg/m².  Oxygen Device: Nasal Cannula 2L    Outcome Measures:  AM-PAC 6 CLICK MOBILITY  Turning over in bed (including adjusting bedclothes, sheets and blankets)?: 3  Sitting down on and standing up from a chair with arms (e.g., wheelchair, bedside commode, etc.): 3  Moving from lying on back to sitting on the side of the bed?: 3  Moving to and from a bed to a chair (including a wheelchair)?: 3  Need to walk in hospital room?: 3  Climbing 3-5 steps with a railing?: 1  Basic Mobility Total Score: 16     Functional Mobility:    Bed Mobility:   · Supine to Sit: minimum assistance; from Lt side of bed  · To assist trunk into full upright while having patient maintain sternal precautions  · Scooting anteriorly to EOB to have both feet planted on floor: contact guard assistance  · Sit to Supine: minimum assistance; to Lt side of bed  · To ensure pt maintained sternal precautions    Sitting Balance at Edge of Bed:   Assistance Level Required: Stand-by Assistance    Transfers:   · Sit <> Stand Transfer: minimum assistance and of 2 persons with hand-held assist   · Stand <> Sit Transfer: minimum assistance and of 2 persons with hand-held assist   · [id number] from EOB and [id number] from bedside chair     Standing Balance:   Assistance Level Required: Contact Guard Assistance   Patient used: no assistive device    Time: 8 minutes   Postural deviations noted: slouched posture, rounded shoulders and forward head   Comments: Bedside chair behind patient for safety. Pt able to tolerate dynamic standing balance task at sink for 8 minutes. Pt was able to reach  outside of MARIO for objects as well as accept internal perturbations to balance while performing self care activity to challenge dynamic balance. Pt used ankle strategy and responded appropriately to internal perturbations. After completing pt req'd seated rest break in chair.      Gait:  · Patient ambulated: 10 ft; 8 ft (standing balance activity and seated break between trials)   · Patient required: minimal assist  · Patient used:  hand-held assist and of 2 persons   · Gait Pattern observed: reciprocal gait  · Gait Deviation(s): occasional unsteady gait, decreased step length, wide base of support, decreased weight shift, shuffle gait, flexed posture and decreased nirav  · Impairments due to: impaired balance, decreased strength and decreased endurance  · all lines remained intact throughout ambulation trial  · Chair follow for patient safety  · Comments: Pt with no overt LOB but pt was generally unsteady throughout, requiring Min A to remain upright. Cueing provided for scanning environment and maintaining neutral c-sp posture and upright positioning throughout ambulation trials.    Stairs: Deferred due to pt's performance with above listed functional mobility    Education:   Time provided for education, counseling and discussion of health disposition in regards to patient's current status   All questions answered within PT scope of practice and to patient's satisfaction   PT role in POC to address current functional deficits   Pt educated on proper body mechanics, safety techniques, and energy conservation with PT facilitation and cueing throughout session   Call nursing/pct to transfer to chair/use bathroom. Pt stated understanding.   Whiteboard updated with pt's current mobility status documented above   Safe to perform step transfer to/from chair/bedside commode Min A and HHA x 2 w/ nursing/PCT present   Importance of OOB tolerance prn hrs/day to improve lung ventilation and expansion as well as  strengthen postural musculature    Patient left left sidelying with all lines intact, call button in reach, RN notified and  present.    GOALS:   Multidisciplinary Problems     Physical Therapy Goals        Problem: Physical Therapy Goal    Goal Priority Disciplines Outcome Goal Variances Interventions   Physical Therapy Goal     PT, PT/OT Ongoing, Progressing     Description:  Goals to be met by: 20    Patient will increase functional independence with mobility by performin. Supine to sit with Contact Guard Assistance -not met  2. Sit to stand transfer with Contact Guard Assistance -not met  3. Gait  x 150 feet with Contact Guard Assistance -not met  4. Ascend/descend 5 stair with left Handrails Contact Guard Assistance -not met                Multidisciplinary Problems (Resolved)        Problem: Physical Therapy Goal    Goal Priority Disciplines Outcome Goal Variances Interventions   Physical Therapy Goal   (Resolved)     PT, PT/OT Met     Description:  Eval only                  Time Tracking:     PT Received On: 20  PT Start Time: 1435     PT Stop Time: 1500  PT Total Time (min): 25 min       Billable Minutes:   · Therapeutic Activity 25    Treatment Type: Treatment  PT/PTA: PT       Courtney Meier PT DPT  2020  Pager: [phone]

## 2020-01-30 NOTE — PLAN OF CARE
Plan of care discussed with patient.  Patient ambulating with 1 person assisst, fall precautions in place.Continuing to encourage sternal precautions, IS, and ambulation. Patient has complained of incisional pain. PRN meds given. Discussed medications and care.Antibiotics started due to the way the sternal incision looks. Telesitter at bed side and bed alarm on. Patient has no questions at this time. Will continue to monitor.

## 2020-01-30 NOTE — NURSING TRANSFER
Nursing Transfer Note      1/30/2020     Transfer To: Dialysis     Transfer via stretcher    Transfer with 3L to O2, cardiac monitoring    Transported by Transportation tech    Chart send with patient: Yes    Notified: spouse    Report called, pt am medication was held except for insulin coverage and the amiodarone.

## 2020-01-30 NOTE — PLAN OF CARE
Problem: Occupational Therapy Goal  Goal: Occupational Therapy Goal  Description  Goals to be met by: 2/1/20     Patient will increase functional independence with ADLs by performing:    Feeding with Sabael.  UE Dressing with Minimal Assistance.  LE Dressing with Moderate Assistance.  Grooming while standing at sink with Contact Guard Assistance. - progressing towards   Toileting from toilet with Moderate Assistance for hygiene and clothing management. -updated 1/27/2020  Toilet transfer to toilet with Minimal Assistance. -updated 1/27/2020       Outcome: Ongoing, Progressing    Sisi Alvarez, OTR/L  Pager: [phone]  1/30/2020

## 2020-01-30 NOTE — PROGRESS NOTES
[facility] NOTE    Suyapa Connelly is a 54 y.o. female currently on hemodialysis for removal of uremic toxins and volume.     Patient seen and evaluated on hemodialysis, tolerating treatment, see HD flowsheet for vitals and assessments.    No Hypotension, chest pain, shortness of breath, cramping, nausea or vomiting.      Labs have been reviewed and the dialysate bath has been adjusted.    Labs:      Recent Labs   Lab 01/28/20 0413 01/29/20  0806 01/30/20  0433   * 131* 133*   K 5.4* 4.4 5.0   CL 99 93* 92*   CO2 15* 24 28   BUN 50* 31* 37*   CREATININE 5.0* 4.3* 5.4*   CALCIUM 9.5 9.7 9.7   PHOS 6.1* 4.5 5.2*       Recent Labs   Lab 01/28/20 0414 01/29/20  0806 01/30/20  0433   WBC 11.54 10.43 11.19   HGB 9.0* 9.0* 8.7*   HCT 30.2* 29.4* 28.4*   * 453* 488*        Assessment/Plan    Ultrafiltration goal: 2-3 L as tolerated, keep MAP >65  - Seen on dialysis this morning, tolerating session with current UFR, no complications.    - HR stable at 58-60, received amiodarone this AM.   - Will continue dialysis treatments while in-patient  - Continue to monitor intake and output, daily weights   - Avoid nephrotoxic medication and renal dose medications to GFR    Anemia of ESRD  - Continue NAEME with dialysis treatments  - Hgb 8.7    BMM  - Renal diet with protein intake goal 1.5 g/kg/d  - Novasource with meals  - Phos 5.2  - Daily renal function panel     Sarah Saba, CHRIS, APRN, FNP-C  Nephrology Department  Pager:  [phone]

## 2020-01-30 NOTE — CARE UPDATE
"BG goal 140 - 180   BG is slightly above goal. Kidney function remains poor. Patient in dialysis during rounds today.      Basal needs most likely being masked by poor kidney function at this time. Hold Levemir for now, patient is at risk for hypoglycemia.   Start Novolog 4 units TIDWM. 50% reduction from previous hospital MDI regimen. Kidney function remains poor.    Low Dose SQ Insulin Correction Scale.  BG Monitoring AC/HS         ** Please call Endocrine for any BG related issues **  ** Please notify Endocrine for any change and/or advance in diet**        Discharge Planning:     TBD. Please notify endocrinology prior to discharge. Patient will need adjustments to home insulin regimen.      Tentative:     Levemir dose TBD    Novolog dose TBD    Low Dose SQ Insulin Correction Scale.    BG Monitoring AC/HS     PRN Insurance preferred diabetes testing supplies    PRN Ultra-Fine Junie Pen Needles 4mm x 32 G (5/32" x 0.23mm).     Patient request outpatient follow-up with [facility] endo clinic for continued DM management.         "

## 2020-01-30 NOTE — PT/OT/SLP PROGRESS
Speech Language Pathology      Suyapa Connelly  MRN: [id number]    Patient not seen today secondary to dialysis on 1st attempt, Patient fatigue on 2nd attempt. Will follow-up 1/31.    MARISOL Castellon, CCC-SLP     MARISOL Castellon, CCC-SLP  Speech Language Pathologist  [phone]  1/30/2020

## 2020-01-30 NOTE — ASSESSMENT & PLAN NOTE
- multimodal pain regimen  - home lexapro  - Extubated 1/21  - CXR daily  - IS  - wean NC  - PO metoprolol and amio 400 TID   - Plavix   - IV Ceftriaxone q12   - Place surgical bra   -Gauze dressing to incision

## 2020-01-30 NOTE — PROGRESS NOTES
[facility]  Cardiothoracic Surgery  Progress Note    Patient Name: Suyapa Connelly  MRN: [id number]  Admission Date: 1/2/2020  Hospital Length of Stay: 28 days  Code Status: Full Code   Attending Physician: Huang Altamirano MD   Referring Provider: Self, Aaareferral  Principal Problem:Acute coronary syndrome            Subjective:     Post-Op Info:  Procedure(s) (LRB):  Insertion, Catheter, Central Venous, Hemodialysis (N/A)   3 Days Post-Op     Interval History: NAEON. Pt went for HD today. Will keep gauze dressing over sternotomy. Keep surgical bra in place. Feels tired after HD but was getting up to work with PT. [facility] accepted patient so now awaiting insurance authorization.     Review of Systems   Constitution: Positive for malaise/fatigue.   Cardiovascular: Negative for chest pain and palpitations.   Respiratory: Positive for shortness of breath.    Hematologic/Lymphatic: Negative for bleeding problem.     Medications:  Continuous Infusions:   heparin (porcine)       Scheduled Meds:   sodium chloride 0.9%   Intravenous Once    amiodarone  400 mg Oral TID    aspirin  81 mg Oral Daily    atorvastatin  80 mg Oral QHS    cefTRIAXone (ROCEPHIN) IVPB  1 g Intravenous Q12H    clopidogrel  75 mg Oral Daily    epoetin nina-ebpx (RETACRIT) injection  4,000 Units Intravenous Every Tues, Thurs, Sat    escitalopram oxalate  10 mg Oral Daily    heparin (porcine)  5,000 Units Subcutaneous Q8H    insulin aspart U-100  4 Units Subcutaneous TIDWM    metoprolol succinate  50 mg Oral Daily    pantoprazole  40 mg Oral BID    polyethylene glycol  17 g Oral Daily    scopolamine  1 patch Transdermal Q3 Days    sevelamer carbonate  800 mg Oral TID WM     PRN Meds:acetaminophen, albuterol-ipratropium, bisacodyl, Dextrose 10% Bolus, Dextrose 10% Bolus, glucagon (human recombinant), glucose, glucose, heparin (porcine), heparin (porcine), HYDROmorphone, insulin aspart U-100, metoclopramide  HCl, metoprolol, ondansetron, oxyCODONE, oxyCODONE     Objective:     Vital Signs (Most Recent):  Temp: 97.7 °F (36.5 °C) (01/30/20 1317)  Pulse: 63 (01/30/20 1317)  Resp: 16 (01/30/20 1317)  BP: (!) 121/58 (01/30/20 1317)  SpO2: 99 % (01/30/20 1317) Vital Signs (24h Range):  Temp:  [97.3 °F (36.3 °C)-98.2 °F (36.8 °C)] 97.7 °F (36.5 °C)  Pulse:  [56-65] 63  Resp:  [15-18] 16  SpO2:  [94 %-99 %] 99 %  BP: ()/(45-94) 121/58     Weight: 96.1 kg (211 lb 13.8 oz)  Body mass index is 36.37 kg/m².    SpO2: 99 %  O2 Device (Oxygen Therapy): nasal cannula    Intake/Output - Last 3 Shifts       01/28 0700 - 01/29 0659 01/29 0700 - 01/30 0659 01/30 0700 - 01/31 0659    P.O. 0 780 240    Other 745  650    Total Intake(mL/kg) 745 (7.8) 780 (8.1) 890 (9.3)    Urine (mL/kg/hr) 0 (0) 0 (0) 0 (0)    Other 788  3150    Stool   0    Total Output 788 0 3150    Net -43 +780 -2260           Urine Occurrence 1 x 2 x 0 x    Stool Occurrence  0 x 0 x          Lines/Drains/Airways     Central Venous Catheter Line                 Hemodialysis Catheter 01/27/20 1624 left subclavian 2 days          Peripheral Intravenous Line                 Midline Catheter Insertion/Assessment  - Single Lumen 01/21/20 1016 Right basilic vein (medial side of arm) 18g x 10cm 9 days                Physical Exam   Constitutional: She is oriented to person, place, and time. She appears well-developed and well-nourished. No distress.   HENT:   Head: Normocephalic and atraumatic.   Eyes: Pupils are equal, round, and reactive to light. EOM are normal.   Neck: Normal range of motion. No JVD present.   Cardiovascular: Normal rate, regular rhythm and normal pulses.   Intermittent bradycardia    Pulmonary/Chest: Effort normal. No respiratory distress.   3L nasal cannula    Abdominal: Soft. She exhibits no distension.   Musculoskeletal: Normal range of motion.   Neurological: She is alert and oriented to person, place, and time.   Skin: Skin is warm, dry and  intact. Capillary refill takes less than 2 seconds. She is not diaphoretic.   Midline sternal incision with some areas of drainage   Psychiatric: She has a normal mood and affect. Her speech is normal and behavior is normal. Judgment and thought content normal.   Vitals reviewed.      Significant Labs:  BMP:   Recent Labs   Lab 01/30/20  0433   *   *   K 5.0   CL 92*   CO2 28   BUN 37*   CREATININE 5.4*   CALCIUM 9.7   MG 2.2     CBC:   Recent Labs   Lab 01/30/20  0433   WBC 11.19   RBC 2.96*   HGB 8.7*   HCT 28.4*   *   MCV 96   MCH 29.4   MCHC 30.6*       Significant Diagnostics:  I have reviewed all pertinent imaging results/findings within the past 24 hours.    Assessment/Plan:     Paroxysmal atrial fibrillation  Patient went into afib with RVR during HD 1/28 and HR sustained at 117 despite 3 doses of IV metop     Amiodarone increased to 400 TID   PO metop     Acute blood loss anemia  Expected post operatively   CBC daily     S/P CABG x 1  - multimodal pain regimen  - home lexapro  - Extubated 1/21  - CXR daily  - IS  - wean NC  - PO metoprolol and amio 400 TID   - Plavix   - IV Ceftriaxone q12   - Place surgical bra   -Gauze dressing to incision     Weakness  PT/OT    Type 2 diabetes mellitus with hyperglycemia, with long-term current use of insulin  Endocrine following     Mixed hyperlipidemia  statin    ROSLYN (acute kidney injury)  - CRRT discontinued 1/21  - Successful HD trial 1/23, 1/25, 1/28, 1/30  - HD stopped 1 hour early 1/28 as patient went into afib with RVR   - Making small amount of urine   - cont hyponatremia and elevated Cr  - PermaCath 1/27 with vascular sx  - d/c LIJ trialysis    Dispo: [facility]. Awaiting d.c to rehab     Luisana Jennings PA-C  Cardiothoracic Surgery  [facility]

## 2020-01-31 PROBLEM — Z29.9 PREVENTIVE MEASURE: Status: ACTIVE | Noted: 2020-01-31

## 2020-01-31 PROBLEM — R23.9 ALTERATION IN SKIN INTEGRITY IN ADULT: Status: ACTIVE | Noted: 2020-01-31

## 2020-01-31 LAB
ALBUMIN SERPL BCP-MCNC: 2.4 G/DL (ref 3.5–5.2)
ALP SERPL-CCNC: 219 U/L (ref 55–135)
ALT SERPL W/O P-5'-P-CCNC: 46 U/L (ref 10–44)
ANION GAP SERPL CALC-SCNC: 12 MMOL/L (ref 8–16)
AST SERPL-CCNC: 27 U/L (ref 10–40)
BASOPHILS # BLD AUTO: 0.09 K/UL (ref 0–0.2)
BASOPHILS NFR BLD: 0.9 % (ref 0–1.9)
BILIRUB SERPL-MCNC: 0.8 MG/DL (ref 0.1–1)
BUN SERPL-MCNC: 21 MG/DL (ref 6–20)
CALCIUM SERPL-MCNC: 9.1 MG/DL (ref 8.7–10.5)
CHLORIDE SERPL-SCNC: 99 MMOL/L (ref 95–110)
CO2 SERPL-SCNC: 20 MMOL/L (ref 23–29)
CREAT SERPL-MCNC: 3.9 MG/DL (ref 0.5–1.4)
DIFFERENTIAL METHOD: ABNORMAL
EOSINOPHIL # BLD AUTO: 0.1 K/UL (ref 0–0.5)
EOSINOPHIL NFR BLD: 1.2 % (ref 0–8)
ERYTHROCYTE [DISTWIDTH] IN BLOOD BY AUTOMATED COUNT: 16 % (ref 11.5–14.5)
EST. GFR  (AFRICAN AMERICAN): 14.3 ML/MIN/1.73 M^2
EST. GFR  (NON AFRICAN AMERICAN): 12.4 ML/MIN/1.73 M^2
GLUCOSE SERPL-MCNC: 139 MG/DL (ref 70–110)
HCT VFR BLD AUTO: 27.5 % (ref 37–48.5)
HGB BLD-MCNC: 8.3 G/DL (ref 12–16)
IMM GRANULOCYTES # BLD AUTO: 0.06 K/UL (ref 0–0.04)
IMM GRANULOCYTES NFR BLD AUTO: 0.6 % (ref 0–0.5)
INR PPP: 1.7 (ref 0.8–1.2)
LYMPHOCYTES # BLD AUTO: 1.4 K/UL (ref 1–4.8)
LYMPHOCYTES NFR BLD: 13 % (ref 18–48)
MAGNESIUM SERPL-MCNC: 2.1 MG/DL (ref 1.6–2.6)
MCH RBC QN AUTO: 29.4 PG (ref 27–31)
MCHC RBC AUTO-ENTMCNC: 30.2 G/DL (ref 32–36)
MCV RBC AUTO: 98 FL (ref 82–98)
MONOCYTES # BLD AUTO: 1.8 K/UL (ref 0.3–1)
MONOCYTES NFR BLD: 17.4 % (ref 4–15)
NEUTROPHILS # BLD AUTO: 7 K/UL (ref 1.8–7.7)
NEUTROPHILS NFR BLD: 66.9 % (ref 38–73)
NRBC BLD-RTO: 0 /100 WBC
PHOSPHATE SERPL-MCNC: 4.2 MG/DL (ref 2.7–4.5)
PLATELET # BLD AUTO: 494 K/UL (ref 150–350)
PMV BLD AUTO: 11 FL (ref 9.2–12.9)
POCT GLUCOSE: 113 MG/DL (ref 70–110)
POCT GLUCOSE: 123 MG/DL (ref 70–110)
POCT GLUCOSE: 132 MG/DL (ref 70–110)
POCT GLUCOSE: 141 MG/DL (ref 70–110)
POCT GLUCOSE: 156 MG/DL (ref 70–110)
POTASSIUM SERPL-SCNC: 4.1 MMOL/L (ref 3.5–5.1)
PROT SERPL-MCNC: 8.3 G/DL (ref 6–8.4)
PROTHROMBIN TIME: 16.4 SEC (ref 9–12.5)
RBC # BLD AUTO: 2.82 M/UL (ref 4–5.4)
SODIUM SERPL-SCNC: 131 MMOL/L (ref 136–145)
WBC # BLD AUTO: 10.46 K/UL (ref 3.9–12.7)

## 2020-01-31 PROCEDURE — 99232 SBSQ HOSP IP/OBS MODERATE 35: CPT | Mod: ,,, | Performed by: NURSE PRACTITIONER

## 2020-01-31 PROCEDURE — 83735 ASSAY OF MAGNESIUM: CPT

## 2020-01-31 PROCEDURE — 97535 SELF CARE MNGMENT TRAINING: CPT

## 2020-01-31 PROCEDURE — 99232 PR SUBSEQUENT HOSPITAL CARE,LEVL II: ICD-10-PCS | Mod: ,,, | Performed by: NURSE PRACTITIONER

## 2020-01-31 PROCEDURE — 63600175 PHARM REV CODE 636 W HCPCS: Performed by: STUDENT IN AN ORGANIZED HEALTH CARE EDUCATION/TRAINING PROGRAM

## 2020-01-31 PROCEDURE — 99900035 HC TECH TIME PER 15 MIN (STAT)

## 2020-01-31 PROCEDURE — 85025 COMPLETE CBC W/AUTO DIFF WBC: CPT

## 2020-01-31 PROCEDURE — 92523 SPEECH SOUND LANG COMPREHEN: CPT

## 2020-01-31 PROCEDURE — 97530 THERAPEUTIC ACTIVITIES: CPT

## 2020-01-31 PROCEDURE — 80053 COMPREHEN METABOLIC PANEL: CPT

## 2020-01-31 PROCEDURE — 97116 GAIT TRAINING THERAPY: CPT

## 2020-01-31 PROCEDURE — 25000003 PHARM REV CODE 250: Performed by: STUDENT IN AN ORGANIZED HEALTH CARE EDUCATION/TRAINING PROGRAM

## 2020-01-31 PROCEDURE — 20600001 HC STEP DOWN PRIVATE ROOM

## 2020-01-31 PROCEDURE — 63600175 PHARM REV CODE 636 W HCPCS: Performed by: PHYSICIAN ASSISTANT

## 2020-01-31 PROCEDURE — 25000003 PHARM REV CODE 250: Performed by: PHYSICIAN ASSISTANT

## 2020-01-31 PROCEDURE — 85610 PROTHROMBIN TIME: CPT

## 2020-01-31 PROCEDURE — 94761 N-INVAS EAR/PLS OXIMETRY MLT: CPT

## 2020-01-31 PROCEDURE — 84100 ASSAY OF PHOSPHORUS: CPT

## 2020-01-31 PROCEDURE — 27000221 HC OXYGEN, UP TO 24 HOURS

## 2020-01-31 PROCEDURE — 25000003 PHARM REV CODE 250: Performed by: NURSE PRACTITIONER

## 2020-01-31 PROCEDURE — 36415 COLL VENOUS BLD VENIPUNCTURE: CPT

## 2020-01-31 RX ORDER — FUROSEMIDE 10 MG/ML
80 INJECTION INTRAMUSCULAR; INTRAVENOUS ONCE
Status: COMPLETED | OUTPATIENT
Start: 2020-01-31 | End: 2020-01-31

## 2020-01-31 RX ORDER — IPRATROPIUM BROMIDE AND ALBUTEROL SULFATE 2.5; .5 MG/3ML; MG/3ML
3 SOLUTION RESPIRATORY (INHALATION) EVERY 6 HOURS PRN
Status: DISCONTINUED | OUTPATIENT
Start: 2020-01-31 | End: 2020-02-04

## 2020-01-31 RX ADMIN — HEPARIN SODIUM 5000 UNITS: 5000 INJECTION, SOLUTION INTRAVENOUS; SUBCUTANEOUS at 01:01

## 2020-01-31 RX ADMIN — SEVELAMER CARBONATE 800 MG: 800 TABLET, FILM COATED ORAL at 05:01

## 2020-01-31 RX ADMIN — AMIODARONE HYDROCHLORIDE 400 MG: 200 TABLET ORAL at 05:01

## 2020-01-31 RX ADMIN — CEFTRIAXONE SODIUM 1 G: 1 INJECTION, POWDER, FOR SOLUTION INTRAMUSCULAR; INTRAVENOUS at 10:01

## 2020-01-31 RX ADMIN — INSULIN ASPART 4 UNITS: 100 INJECTION, SOLUTION INTRAVENOUS; SUBCUTANEOUS at 05:01

## 2020-01-31 RX ADMIN — PANTOPRAZOLE SODIUM 40 MG: 40 TABLET, DELAYED RELEASE ORAL at 08:01

## 2020-01-31 RX ADMIN — SEVELAMER CARBONATE 800 MG: 800 TABLET, FILM COATED ORAL at 01:01

## 2020-01-31 RX ADMIN — SEVELAMER CARBONATE 800 MG: 800 TABLET, FILM COATED ORAL at 09:01

## 2020-01-31 RX ADMIN — ESCITALOPRAM 10 MG: 5 TABLET, FILM COATED ORAL at 09:01

## 2020-01-31 RX ADMIN — AMIODARONE HYDROCHLORIDE 400 MG: 200 TABLET ORAL at 09:01

## 2020-01-31 RX ADMIN — INSULIN ASPART 4 UNITS: 100 INJECTION, SOLUTION INTRAVENOUS; SUBCUTANEOUS at 01:01

## 2020-01-31 RX ADMIN — CLOPIDOGREL BISULFATE 75 MG: 75 TABLET, FILM COATED ORAL at 09:01

## 2020-01-31 RX ADMIN — HEPARIN SODIUM 5000 UNITS: 5000 INJECTION, SOLUTION INTRAVENOUS; SUBCUTANEOUS at 06:01

## 2020-01-31 RX ADMIN — INSULIN ASPART 4 UNITS: 100 INJECTION, SOLUTION INTRAVENOUS; SUBCUTANEOUS at 10:01

## 2020-01-31 RX ADMIN — HEPARIN SODIUM 5000 UNITS: 5000 INJECTION, SOLUTION INTRAVENOUS; SUBCUTANEOUS at 08:01

## 2020-01-31 RX ADMIN — ATORVASTATIN CALCIUM 80 MG: 20 TABLET, FILM COATED ORAL at 08:01

## 2020-01-31 RX ADMIN — CEFTRIAXONE SODIUM 1 G: 1 INJECTION, POWDER, FOR SOLUTION INTRAMUSCULAR; INTRAVENOUS at 08:01

## 2020-01-31 RX ADMIN — PANTOPRAZOLE SODIUM 40 MG: 40 TABLET, DELAYED RELEASE ORAL at 09:01

## 2020-01-31 RX ADMIN — ASPIRIN 81 MG CHEWABLE TABLET 81 MG: 81 TABLET CHEWABLE at 09:01

## 2020-01-31 RX ADMIN — AMIODARONE HYDROCHLORIDE 400 MG: 200 TABLET ORAL at 08:01

## 2020-01-31 RX ADMIN — FUROSEMIDE 80 MG: 10 INJECTION, SOLUTION INTRAMUSCULAR; INTRAVENOUS at 01:01

## 2020-01-31 RX ADMIN — METOPROLOL SUCCINATE 50 MG: 50 TABLET, EXTENDED RELEASE ORAL at 09:01

## 2020-01-31 NOTE — PLAN OF CARE
01/31/20 1643   Post-Acute Status   Post-Acute Authorization Placement   Post-Acute Placement Status Referrals Sent  (SNF)     SW informed that denial of inpatient rehab upheld after [id number] but that they would authorize SNF.  EDUARD sent referral blast to multiple skilled nursing facilities via .    Jenna Bonilla LMSW  [facility]  [phone]

## 2020-01-31 NOTE — PLAN OF CARE
Problem: Occupational Therapy Goal  Goal: Occupational Therapy Goal  Description  Goals to be met by: 2/1/20     Patient will increase functional independence with ADLs by performing:    Feeding with [place].  UE Dressing with Minimal Assistance.  LE Dressing with Moderate Assistance.  Grooming while standing at sink with Contact Guard Assistance. - progressing towards   Toileting from toilet with Moderate Assistance for hygiene and clothing management. -updated 1/27/2020  Toilet transfer to toilet with Minimal Assistance. -updated 1/27/2020       Outcome: Ongoing, Progressing    Sisi Alvarez, OTR/L  Pager: [phone]  1/31/2020

## 2020-01-31 NOTE — CARE UPDATE
"BG goal 140 - 180   BG within goal ranges. Kidney function remains poor.     Basal needs most likely being masked by poor kidney function at this time. Hold Levemir for now, patient is at risk for hypoglycemia.   Continue Novolog 4 units TIDWM. 50% reduction from previous hospital MDI regimen. Kidney function remains poor.    Low Dose SQ Insulin Correction Scale.  BG Monitoring AC/HS         ** Please call Endocrine for any BG related issues **  ** Please notify Endocrine for any change and/or advance in diet**        Discharge Planning:     TBD. Please notify endocrinology prior to discharge. Patient will need adjustments to home insulin regimen.      Tentative:     Levemir dose TBD    Novolog dose TBD    Low Dose SQ Insulin Correction Scale.    BG Monitoring AC/HS     PRN Insurance preferred diabetes testing supplies    PRN Ultra-Fine Junie Pen Needles 4mm x 32 G (5/32" x 0.23mm).     Patient request outpatient follow-up with [facility] for continued DM management.                 "

## 2020-01-31 NOTE — PROGRESS NOTES
Mrs. Connelly is stable for discharge from Nephrology standpoint. She is starting to make more urine and will need close monitoring per Neprhology for ongoing signs of renal recovery.     KATHLEEN Saba DNP, APRN, FNP-C  Department of Nephrology  [facility]  Pager: [phone]

## 2020-01-31 NOTE — ASSESSMENT & PLAN NOTE
- CRRT discontinued 1/21  - Successful HD trial 1/23, 1/25, 1/28, 1/30  - HD stopped 1 hour early 1/28 as patient went into afib with RVR   - Making small amount of urine   - cont hyponatremia and elevated Cr  - PermaCath 1/27 with vascular sx  - d/c MOMOJ trialysis  - Will give push of lasix 80 IV to assess renal recovery per neph recs   - Patient stable for rehab

## 2020-01-31 NOTE — PLAN OF CARE
Pt remained free of injuries, falls, and trauma. VSS. No complaints of pain mentioned tonight. Glucose monitored, 140. No SSI needed. Scheduled antibiotic administered. Pt had HD today, 2.5 L were removed. Sternal precautions, IS, and ambulation encouraged. Fall precautions maintained. Tele-sitter at bedside. Plan of care reviewed with pt. Pt verbalized understanding. All questions and concerns addressed. No complaints mentioned at this time. Will continue to monitor.

## 2020-01-31 NOTE — PT/OT/SLP EVAL
Speech Language Pathology Evaluation  Cognitive Communication    Patient Name:  Suyapa Connelly   MRN:  [id number]  Admitting Diagnosis: Acute coronary syndrome    Recommendations:     Recommendations:                General Recommendations:  Cognitive-linguistic therapy and monitor diet tolerance  Diet recommendations:  Regular, Thin   Aspiration Precautions: 1 bite/sip at a time, Alternating bites/sips, Avoid talking while eating, HOB to 90 degrees, Monitor for s/s of aspiration, Small bites/sips and Standard aspiration precautions   General Precautions: Standard, aspiration, fall, sternal  Communication strategies:  go to room if call light pushed    History:     Past Medical History:   Diagnosis Date    Anxiety     Depression     Diabetes mellitus     type 2    GERD (gastroesophageal reflux disease)     Hyperlipemia     Hypertension     Myocardial infarction 2010    minor-caused by stress per pt.       Past Surgical History:   Procedure Laterality Date    Angiogram - Right Extremity Right 7/9/15    angiogram-left leg  10/6/15    CATHETERIZATION OF BOTH LEFT AND RIGHT HEART N/A 12/18/2019    Procedure: CATHETERIZATION, HEART, BOTH LEFT AND RIGHT;  Surgeon: Que Fernando III, MD;  Location: [facility] CATH LAB;  Service: Cardiology;  Laterality: N/A;    CORONARY ANGIOGRAPHY N/A 12/18/2019    Procedure: ANGIOGRAM, CORONARY ARTERY;  Surgeon: Que Fernando III, MD;  Location: [facility] CATH LAB;  Service: Cardiology;  Laterality: N/A;    CORONARY ARTERY BYPASS GRAFT (CABG) N/A 1/14/2020    Procedure: CORONARY ARTERY BYPASS GRAFT (CABG) x 1     Off Pump;  Surgeon: Huang Altamirano MD;  Location: [facility] OR [address] [place] [address];  Service: Cardiovascular;  Laterality: N/A;    INSERTION OF TUNNELED CENTRAL VENOUS HEMODIALYSIS CATHETER N/A 1/27/2020    Procedure: Insertion, Catheter, Central Venous, Hemodialysis;  Surgeon: ESTEBAN Gomez III, MD;  Location: [facility] CATH LAB;  Service: Peripheral Vascular;  Laterality: N/A;  "      Social history and Occupation/hobbies/homemaking: Pt lives with  and mother-in-law. She was not working, but still drives, manages medications, and manages finances.     Subjective     *"My mind is blank." pt commented during the word fluency task.     Pain/Comfort:  · Pain Rating 1: 0/10    Objective:   Cognitive Status:    O x 4. Recall of general information was good. Pt immediately recalled up to 7 digits in a series.  Following a 3 minute delay, pt recalled 3/3 words given cues (1/3 ind'ly).  Pt provided solutions to hypothetical problems with min cues.  Multiple causes for a hypothetical problem were generated with good ability.       Receptive Language:   Comprehension:      WFL Pt able to answer complex yes/no q's and follow 2- & 3-step commands with 100% accuracy.    Expressive Language:  Verbal:    Pt able to express basic and more complex information. During a word fluency task, pt listed 9 items in a category with min cues (15-20 is wnl).       Motor Speech:  WFL    Voice:   Quality Hoarse (noted to be increased on this service date)    Visual-Spatial:  tba    Reading:   tba     Written Expression:   tba    Treatment: Education provided to pt regarding cognition, cognitive evaluation, short term memory, problem solving, word fluency, and recommendations to continue cognitive therapy to increase pt's safety and independence upon t/f to rehab and discharge.  Pt expressed understanding and agreement with recommendations.     Assessment:   Suyapa Connelly is a 54 y.o. female with an SLP diagnosis of Dysphagia (improving) and Cognitive-Linguistic Impairment.      Goals:   Multidisciplinary Problems     SLP Goals        Problem: SLP Goal    Goal Priority Disciplines Outcome   SLP Goal     SLP Ongoing, Progressing   Description:  Speech Language Pathology Goals  Revised goals expected to be met by 2/7:  1. Pt will tolerate a regular consistency diet and thin liquids without s/s of aspiration.  2. " Pt will recall 3/3 words or facts after a 3 minute delay given min cues.   3. Pt will complete moderate level problem solving tasks with 80% accuracy.   4. Pt will list an average of 11 items in a category in one minute.   5. Pt will participate in assessment of reading, writing, visual spatial, and functional math abilities.     Revised goals expected to be met 2/5:  1. Pt will tolerate a regular consistency diet and thin liquids without s/s of aspiration.  2. Pt will participate in speech/language/cognitive evaluation to determine need for cognitive intervention. Goal met 1/31.    Goals expected to be met by 1/29:  1. Pt will tolerate least restrictive diet without s/s of aspiration. Goal met                          Plan:   · Patient to be seen:  4 x/week   · Plan of Care expires:  02/21/20  · Plan of Care reviewed with:  patient   · SLP Follow-Up:  Yes       Discharge recommendations:  Discharge Facility/Level of Care Needs: rehabilitation facility     Time Tracking:   SLP Treatment Date:   01/31/20  Speech Start Time:  1136  Speech Stop Time:  1152     Speech Total Time (min):  16 min    Billable Minutes: [place] 8  and [facility]/Home Management Training 8    MARISOL Castellon, CCC-SLP  01/31/2020     MARISOL Castellon, CCC-SLP  Speech Language Pathologist  [phone]  1/31/2020

## 2020-01-31 NOTE — PROGRESS NOTES
[facility]  Nephrology  Progress Note    Patient Name: Suyapa Connelly  MRN: [id number]  Admission Date: 1/2/2020  Hospital Length of Stay: 29 days  Attending Provider: Huang Altamirano MD   Primary Care Physician: Erlinda Rahman NP  Principal Problem:Acute coronary syndrome    Subjective:     HPI: 53 y/o AAF with known medical medical issues of chronic combined systolic and diastolic HF (EF 35% Grade II DD), CAD S/P MI 2010, Angiogram 12/18/19 mid LAD 90% stenosis, mid OM2 75% stenosis, and Ostial D1 70% stenosis LVDEP 25 mmHg, PAD S/P PTA with stents BLE 2015, IDDM who presented to the ED with SOB and chest pain, found to have an NSTEMI which was treated medically with asa, heparin gtt. CTA of the lower extremities performed on 01/03 to investigate vasculature useful for CABG. Creatinine 3.6 up from a baseline of 1.2. She reports decreased urine output since the CTA but no increased swelling of the lower extremities. No SOB. She an episode of hypotension on 01/04 with a systolic of 80. She was on Lasix IV BID, last dose on 01/04 at 9:00 am. Nephrology consulted for oliguric ROSLYN.      Interval History: HD completed on yesterday. Net UF 2.5 L. Patient net negative 2.3 L/24 hrs. Patient with increased UOP overnight, 300 mL and 1 unmeasured occurrence over the last 24 hrs.     Review of patient's allergies indicates:   Allergen Reactions    Contrast media      Kidney injury    Pcn [penicillins]      Rash; tolerated ceftriaxone on 1/13/20     Current Facility-Administered Medications   Medication Frequency    0.9%  NaCl infusion Once    acetaminophen tablet 650 mg Q6H PRN    albuterol-ipratropium 2.5 mg-0.5 mg/3 mL nebulizer solution 3 mL Q4H PRN    amiodarone tablet 400 mg TID    aspirin chewable tablet 81 mg Daily    atorvastatin tablet 80 mg QHS    bisacodyl suppository 10 mg Q12H PRN    cefTRIAXone injection 1 g Q12H    clopidogrel tablet 75 mg Daily    dextrose 10% (D10W) Bolus  PRN    dextrose 10% (D10W) Bolus PRN    epoetin nina-epbx injection 4,000 Units Every Tues, Thurs, Sat    escitalopram oxalate tablet 10 mg Daily    glucagon (human recombinant) injection 1 mg PRN    glucose chewable tablet 16 g PRN    glucose chewable tablet 24 g PRN    heparin (porcine) injection 1,000 Units PRN    heparin (porcine) injection 5,000 Units Q8H    heparin infusion 1,000 units/500 ml in 0.9% NaCl (pressure line flush) Continuous PRN    HYDROmorphone injection 0.2 mg Q1H PRN    insulin aspart U-100 pen 0-5 Units QID (AC + HS) PRN    insulin aspart U-100 pen 4 Units TIDWM    metoclopramide HCl injection 10 mg Q6H PRN    metoprolol injection 5 mg Q5 Min PRN    metoprolol succinate (TOPROL-XL) 24 hr tablet 50 mg Daily    ondansetron injection 4 mg Q6H PRN    oxyCODONE immediate release tablet 10 mg Q4H PRN    oxyCODONE immediate release tablet 5 mg Q4H PRN    pantoprazole EC tablet 40 mg BID    polyethylene glycol packet 17 g Daily    scopolamine 1.3-1.5 mg (1 mg over 3 days) 1 patch Q3 Days    sevelamer carbonate tablet 800 mg TID WM       Objective:     Vital Signs (Most Recent):  Temp: 98.4 °F (36.9 °C) (01/31/20 0600)  Pulse: 61 (01/31/20 0600)  Resp: 18 (01/31/20 0600)  BP: 135/63 (01/31/20 0600)  SpO2: 98 % (01/31/20 0600)  O2 Device (Oxygen Therapy): nasal cannula (01/30/20 1958) Vital Signs (24h Range):  Temp:  [97.3 °F (36.3 °C)-98.4 °F (36.9 °C)] 98.4 °F (36.9 °C)  Pulse:  [54-64] 61  Resp:  [16-18] 18  SpO2:  [96 %-99 %] 98 %  BP: ()/(45-94) 135/63     Weight: 96.1 kg (211 lb 13.8 oz) (01/30/20 0500)  Body mass index is 36.37 kg/m².  Body surface area is 2.08 meters squared.    I/O last 3 completed shifts:  In: 1250 [P.O.:600; Other:650]  Out: 3450 [Urine:300; Other:3150]    Physical Exam   Constitutional: She is oriented to person, place, and time. She appears well-developed and well-nourished. No distress.   HENT:   Head: Normocephalic and atraumatic.   Eyes:  Pupils are equal, round, and reactive to light. EOM are normal.   Neck: Normal range of motion. No JVD present.   Cardiovascular: Normal rate, regular rhythm and normal pulses.   Intermittent bradycardia    Pulmonary/Chest: Effort normal. No respiratory distress. She has rales.   3L nasal cannula    Abdominal: Soft. She exhibits no distension.   Musculoskeletal: Normal range of motion.   Neurological: She is alert and oriented to person, place, and time.   Skin: Skin is warm, dry and intact. Capillary refill takes less than 2 seconds. She is not diaphoretic.   Sternal incision MACKENZIE   Psychiatric: She has a normal mood and affect. Her speech is normal and behavior is normal.   Vitals reviewed.      Significant Labs:  CBC:   Recent Labs   Lab 01/31/20 0414   WBC 10.46   RBC 2.82*   HGB 8.3*   HCT 27.5*   *   MCV 98   MCH 29.4   MCHC 30.2*     CMP:   Recent Labs   Lab 01/31/20 0414   *   CALCIUM 9.1   ALBUMIN 2.4*   PROT 8.3   *   K 4.1   CO2 20*   CL 99   BUN 21*   CREATININE 3.9*   ALKPHOS 219*   ALT 46*   AST 27   BILITOT 0.8            Assessment/Plan:     * Acute coronary syndrome  - per primary team     ROSLYN (acute kidney injury)  At time of re-consult: Ms Connelly is a 55 y/o female who is s/p CABG. Nephrology re-consulted for ROSLYN, decreased UOP, and concern for volume overload. Patient is now anuric with sCr trending up (4.4 on 1/16/20). Patient with metabolic acidosis on ABG/serum CO2 of 19. Patient now with concern for volume overload and now intubated after progressively increased oxygen requirements. Concern for pulmonary edema on CXR.     Plan/recommendations:   -No urgent need for HD at this time. Patient with some signs of renal recovery. Increased UOP overnight. Patient with complaints of mild shortness of breath.   -Recommend Lasix  mg IV x 1 dose, titrate depending on response   -Will tentatively plan to hold HD tomorrow demanding on response to Lasix challenge   -Renal diet    -Strict I/O's   -Renally dose medications   -Avoid nephrotoxic medications  -Daily renal function panels   -Maintain Hgb >7.0  -Will discuss with Dr. Lange       Thank you for your consult. I will follow-up with patient. Please contact us if you have any additional questions.    Sarah Saba DNP, FNP-C  Nephrology  [facility]

## 2020-01-31 NOTE — CONSULTS
Wound care consulted for gluteal cleft  PMH:   Acute coronary syndrome, ROSLYN, s/p CABG, CAD, IDDM  Assessment:  The gluteal cleft has intertrigo- partial thickness skin loss between the skin folds ~ 0.3 cm L x 0.1 cm W.   The bilateral posterior thighs have an alteration in skin integrity.  The left thigh has dried skin over a wound bed, no erythema or drainage  The right thigh has partial thickness- shearing- without erythema/drainage. The rico-wound has dark pigmentation.  The left forearm has an intact blister with a tegaderm film over the wound.  The tegaderm film was left in place to decrease possible MARSI- medical adhesive related skin injury. The film should be removed with adhesive remover to avoid skin injury.   There are circular scar tissue to the legs/arms - patient denies having skin issues in the past.   Recommendations:   Triad ointment to the wound beds to promote healing, provide moisture protection.    Pressure prevention measures.   Dermatology follow-up as out-patient  Recommendations approved by WENDI Jennings NP. Discussed with patient and verbalized understanding.   Nursing to continue care, wound care to follow-up felicita Kim RN, [facility]  [id number]  Left posterior thigh    Right posterior thigh    Left forearm

## 2020-01-31 NOTE — ASSESSMENT & PLAN NOTE
- multimodal pain regimen  - home lexapro  - Extubated 1/21  - CXR daily  - IS  - wean NC  - PO metoprolol and amio 400 TID  - Plavix   - IV Ceftriaxone q12. Will discharge with PO keflex for 10 days   - Place surgical bra   -Gauze dressing to incision

## 2020-01-31 NOTE — PLAN OF CARE
Problem: SLP Goal  Goal: SLP Goal  Description  Speech Language Pathology Goals  Revised goals expected to be met by 2/7:  1. Pt will tolerate a regular consistency diet and thin liquids without s/s of aspiration.  2. Pt will recall 3/3 words or facts after a 3 minute delay given min cues.   3. Pt will complete moderate level problem solving tasks with 80% accuracy.   4. Pt will list an average of 11 items in a category in one minute.   5. Pt will participate in assessment of reading, writing, visual spatial, and functional math abilities.     Revised goals expected to be met 2/5:  1. Pt will tolerate a regular consistency diet and thin liquids without s/s of aspiration.  2. Pt will participate in speech/language/cognitive evaluation to determine need for cognitive intervention. Goal met 1/31.    Goals expected to be met by 1/29:  1. Pt will tolerate least restrictive diet without s/s of aspiration. Goal met         Outcome: Ongoing, Progressing  SLC eval completed. Cognitive therapy warranted at this time.  Goals revised.   MARISOL Castellon, CCC-SLP  Speech Language Pathologist  [phone]  1/31/2020

## 2020-01-31 NOTE — PROGRESS NOTES
[facility]  Cardiothoracic Surgery  Progress Note    Patient Name: Suyapa Connelly  MRN: [id number]  Admission Date: 1/2/2020  Hospital Length of Stay: 29 days  Code Status: Full Code   Attending Physician: Huang Altamirano MD   Referring Provider: Self, Aaareferral  Principal Problem:Acute coronary syndrome            Subjective:     Post-Op Info:  Procedure(s) (LRB):  Insertion, Catheter, Central Venous, Hemodialysis (N/A)   4 Days Post-Op     Interval History: NAEON.  was absent upon entry to room. Patient was smiling and pleasant. Said she is feeling much better today. Still SOB with exertion. Continues to improve with PT. Will give a trial of lasix to assess renal recovery as patient continues to make more urine. Spoke with nephrology team who agree patient is stable for rehab.     Review of Systems   Constitution: Negative for malaise/fatigue.   Cardiovascular: Negative for chest pain, claudication, dyspnea on exertion, irregular heartbeat, leg swelling and palpitations.   Respiratory: Negative for cough and shortness of breath.    Hematologic/Lymphatic: Negative for bleeding problem.   Gastrointestinal: Negative for abdominal pain.   Genitourinary: Negative for dysuria.   Neurological: Negative for headaches and weakness.     Medications:  Continuous Infusions:   heparin (porcine)       Scheduled Meds:   sodium chloride 0.9%   Intravenous Once    amiodarone  400 mg Oral TID    aspirin  81 mg Oral Daily    atorvastatin  80 mg Oral QHS    cefTRIAXone (ROCEPHIN) IVPB  1 g Intravenous Q12H    clopidogreL  75 mg Oral Daily    epoetin nina-ebpx (RETACRIT) injection  4,000 Units Intravenous Every Tues, Thurs, Sat    escitalopram oxalate  10 mg Oral Daily    heparin (porcine)  5,000 Units Subcutaneous Q8H    insulin aspart U-100  4 Units Subcutaneous TIDWM    metoprolol succinate  50 mg Oral Daily    pantoprazole  40 mg Oral BID    polyethylene glycol  17 g Oral Daily     scopolamine  1 patch Transdermal Q3 Days    sevelamer carbonate  800 mg Oral TID WM     PRN Meds:acetaminophen, albuterol-ipratropium, bisacodyL, Dextrose 10% Bolus, Dextrose 10% Bolus, glucagon (human recombinant), glucose, glucose, heparin (porcine), heparin (porcine), HYDROmorphone, insulin aspart U-100, metoclopramide HCl, metoprolol, ondansetron, oxyCODONE, oxyCODONE     Objective:     Vital Signs (Most Recent):  Temp: 98 °F (36.7 °C) (01/31/20 1300)  Pulse: (!) 56 (01/31/20 1300)  Resp: 18 (01/31/20 1300)  BP: 138/69 (01/31/20 1300)  SpO2: 99 % (01/31/20 1300) Vital Signs (24h Range):  Temp:  [97.7 °F (36.5 °C)-98.4 °F (36.9 °C)] 98 °F (36.7 °C)  Pulse:  [54-64] 56  Resp:  [16-18] 18  SpO2:  [96 %-99 %] 99 %  BP: (116-138)/(55-72) 138/69     Weight: 96.1 kg (211 lb 13.8 oz)  Body mass index is 36.37 kg/m².    SpO2: 99 %  O2 Device (Oxygen Therapy): nasal cannula    Intake/Output - Last 3 Shifts       01/29 0700 - 01/30 0659 01/30 0700 - 01/31 0659 01/31 0700 - 02/01 0659    P.O. 780 480 360    Other  650     Total Intake(mL/kg) 780 (8.1) 1130 (11.8) 360 (3.7)    Urine (mL/kg/hr) 0 (0) 300 (0.1)     Other  3150     Stool  0     Total Output 0 3450     Net +780 -2320 +360           Urine Occurrence 2 x 1 x     Stool Occurrence 0 x 1 x           Lines/Drains/Airways     Central Venous Catheter Line                 Hemodialysis Catheter 01/27/20 1624 left subclavian 3 days          Peripheral Intravenous Line                 Midline Catheter Insertion/Assessment  - Single Lumen 01/21/20 1016 Right basilic vein (medial side of arm) 18g x 10cm 10 days                Physical Exam   Constitutional: She is oriented to person, place, and time. She appears well-developed and well-nourished. No distress.   HENT:   Head: Normocephalic and atraumatic.   Eyes: Pupils are equal, round, and reactive to light. EOM are normal.   Neck: Normal range of motion. No JVD present.   Cardiovascular: Normal rate, regular rhythm and  normal pulses.   Pulmonary/Chest: Effort normal. No respiratory distress.   On 3L nasal cannula    Abdominal: Soft. She exhibits no distension.   Musculoskeletal: Normal range of motion.   Neurological: She is alert and oriented to person, place, and time.   Skin: Skin is warm, dry and intact. Capillary refill takes less than 2 seconds. She is not diaphoretic.   Midline sternal incision with some purulent drainage. Gauze dressing in place    Psychiatric: She has a normal mood and affect. Her speech is normal and behavior is normal. Judgment and thought content normal.   Vitals reviewed.      Significant Labs:  BMP:   Recent Labs   Lab 01/31/20 0414   *   *   K 4.1   CL 99   CO2 20*   BUN 21*   CREATININE 3.9*   CALCIUM 9.1   MG 2.1     CBC:   Recent Labs   Lab 01/31/20 0414   WBC 10.46   RBC 2.82*   HGB 8.3*   HCT 27.5*   *   MCV 98   MCH 29.4   MCHC 30.2*       Significant Diagnostics:  I have reviewed all pertinent imaging results/findings within the past 24 hours.    Assessment/Plan:     Paroxysmal atrial fibrillation  Patient went into afib with RVR during HD 1/28 and HR sustained at 117 despite 3 doses of IV metop     Amiodarone increased to 400 TID   PO metop         Acute blood loss anemia  Expected post operatively   CBC daily     S/P CABG x 1  - multimodal pain regimen  - home lexapro  - Extubated 1/21  - CXR daily  - IS  - wean NC  - PO metoprolol and amio 400 TID  - Plavix   - IV Ceftriaxone q12. Will discharge with PO keflex for 10 days   - Place surgical bra   -Gauze dressing to incision         Weakness  PT/OT    CAD (coronary artery disease)        Type 2 diabetes mellitus with hyperglycemia, with long-term current use of insulin  Endocrine following     Mixed hyperlipidemia  statin    ROSLYN (acute kidney injury)  - CRRT discontinued 1/21  - Successful HD trial 1/23, 1/25, 1/28, 1/30  - HD stopped 1 hour early 1/28 as patient went into afib with RVR   - Making small amount of urine    - cont hyponatremia and elevated Cr  - PermaCath 1/27 with vascular sx  - d/c LIJ trialysis  - Will give push of lasix 80 IV to assess renal recovery per neph recs   - Patient stable for rehab         HARLEY WardC  Cardiothoracic Surgery  [facility]

## 2020-01-31 NOTE — SUBJECTIVE & OBJECTIVE
Interval History: HD completed on yesterday. Net UF 2.5 L. Patient net negative 2.3 L/24 hrs. Patient with increased UOP overnight, 300 mL and 1 unmeasured occurrence over the last 24 hrs.     Review of patient's allergies indicates:   Allergen Reactions    Contrast media      Kidney injury    Pcn [penicillins]      Rash; tolerated ceftriaxone on 1/13/20     Current Facility-Administered Medications   Medication Frequency    0.9%  NaCl infusion Once    acetaminophen tablet 650 mg Q6H PRN    albuterol-ipratropium 2.5 mg-0.5 mg/3 mL nebulizer solution 3 mL Q4H PRN    amiodarone tablet 400 mg TID    aspirin chewable tablet 81 mg Daily    atorvastatin tablet 80 mg QHS    bisacodyl suppository 10 mg Q12H PRN    cefTRIAXone injection 1 g Q12H    clopidogrel tablet 75 mg Daily    dextrose 10% (D10W) Bolus PRN    dextrose 10% (D10W) Bolus PRN    epoetin nina-epbx injection 4,000 Units Every Tues, Thurs, Sat    escitalopram oxalate tablet 10 mg Daily    glucagon (human recombinant) injection 1 mg PRN    glucose chewable tablet 16 g PRN    glucose chewable tablet 24 g PRN    heparin (porcine) injection 1,000 Units PRN    heparin (porcine) injection 5,000 Units Q8H    heparin infusion 1,000 units/500 ml in 0.9% NaCl (pressure line flush) Continuous PRN    HYDROmorphone injection 0.2 mg Q1H PRN    insulin aspart U-100 pen 0-5 Units QID (AC + HS) PRN    insulin aspart U-100 pen 4 Units TIDWM    metoclopramide HCl injection 10 mg Q6H PRN    metoprolol injection 5 mg Q5 Min PRN    metoprolol succinate (TOPROL-XL) 24 hr tablet 50 mg Daily    ondansetron injection 4 mg Q6H PRN    oxyCODONE immediate release tablet 10 mg Q4H PRN    oxyCODONE immediate release tablet 5 mg Q4H PRN    pantoprazole EC tablet 40 mg BID    polyethylene glycol packet 17 g Daily    scopolamine 1.3-1.5 mg (1 mg over 3 days) 1 patch Q3 Days    sevelamer carbonate tablet 800 mg TID WM       Objective:     Vital Signs (Most  Recent):  Temp: 98.4 °F (36.9 °C) (01/31/20 0600)  Pulse: 61 (01/31/20 0600)  Resp: 18 (01/31/20 0600)  BP: 135/63 (01/31/20 0600)  SpO2: 98 % (01/31/20 0600)  O2 Device (Oxygen Therapy): nasal cannula (01/30/20 1958) Vital Signs (24h Range):  Temp:  [97.3 °F (36.3 °C)-98.4 °F (36.9 °C)] 98.4 °F (36.9 °C)  Pulse:  [54-64] 61  Resp:  [16-18] 18  SpO2:  [96 %-99 %] 98 %  BP: ()/(45-94) 135/63     Weight: 96.1 kg (211 lb 13.8 oz) (01/30/20 0500)  Body mass index is 36.37 kg/m².  Body surface area is 2.08 meters squared.    I/O last 3 completed shifts:  In: 1250 [P.O.:600; Other:650]  Out: 3450 [Urine:300; Other:3150]    Physical Exam   Constitutional: She is oriented to person, place, and time. She appears well-developed and well-nourished. No distress.   HENT:   Head: Normocephalic and atraumatic.   Eyes: Pupils are equal, round, and reactive to light. EOM are normal.   Neck: Normal range of motion. No JVD present.   Cardiovascular: Normal rate, regular rhythm and normal pulses.   Intermittent bradycardia    Pulmonary/Chest: Effort normal. No respiratory distress. She has rales.   3L nasal cannula    Abdominal: Soft. She exhibits no distension.   Musculoskeletal: Normal range of motion.   Neurological: She is alert and oriented to person, place, and time.   Skin: Skin is warm, dry and intact. Capillary refill takes less than 2 seconds. She is not diaphoretic.   Sternal incision MACKENZIE   Psychiatric: She has a normal mood and affect. Her speech is normal and behavior is normal.   Vitals reviewed.      Significant Labs:  CBC:   Recent Labs   Lab 01/31/20  0414   WBC 10.46   RBC 2.82*   HGB 8.3*   HCT 27.5*   *   MCV 98   MCH 29.4   MCHC 30.2*     CMP:   Recent Labs   Lab 01/31/20  0414   *   CALCIUM 9.1   ALBUMIN 2.4*   PROT 8.3   *   K 4.1   CO2 20*   CL 99   BUN 21*   CREATININE 3.9*   ALKPHOS 219*   ALT 46*   AST 27   BILITOT 0.8

## 2020-01-31 NOTE — PT/OT/SLP PROGRESS
Occupational Therapy   Treatment    Name: Suyapa Connelly  MRN: [id number]  Admitting Diagnosis:  Acute coronary syndrome  4 Days Post-Op    Recommendations:     Discharge Recommendations: rehabilitation facility  Discharge Equipment Recommendations:  (TBD at next level of care )  Barriers to discharge:  Other (Comment)(Pt requires increased assistance at current functional level )    Assessment:     Suyapa Connelly is a 54 y.o. female with a medical diagnosis of Acute coronary syndrome.  She presents with performance deficits affecting function are weakness, impaired endurance, impaired sensation, impaired balance, impaired self care skills, impaired functional mobilty, gait instability, impaired cardiopulmonary response to activity, decreased upper extremity function, decreased lower extremity function, pain. Pt tolerated session well and is an excellent candidate for IP Rehab. Pt is highly motivated and has been progressing well towards all goals. Pt's  present during therapy session. Pt is minimally conversive when pt's  is present in the room. Pt's  primarily answering questions rather than allotting pt to answer question.  Pt's  told PT/OT that MD Altamirano  informed pt and himself if pt improved enough she would not need IPR. 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 Pt would benefit from continued skilled acute OT services in order to maximize independence and safety with ADLs and functional mobility to ensure safe return to PLOF in the least restrictive environment.     Rehab Prognosis:  Good; patient would benefit from acute skilled OT services to address these deficits and reach maximum level of function.       Plan:     Patient to be seen 5 x/week to address the above listed problems via self-care/home management, therapeutic activities,  "therapeutic exercises  · Plan of Care Expires: 02/21/20  · Plan of Care Reviewed with: patient, spouse(Pt's husbands mother present )    Subjective     Pain/Comfort:  · Pain Rating 1: (Pt reported pain at port site but did not rate )  · Location - Side 1: Left  · Location - Orientation 1: upper  · Location 1: (port site )  · Pain Addressed 1: Distraction    Objective:     Communicated with: RN prior to session.  Patient found HOB elevated with telemetry, oxygen upon OT entry to room. Pt agreeable to therapy session.     OT stated, "Who is in the bathroom?". Pt's  stated, " Her boyfriend" (pt's  laughing and referring to pt). Pt made no facial expression or verbalization to pt's  comment.     General Precautions: Standard, fall   Orthopedic Precautions:N/A   Braces: N/A     Occupational Performance:     Bed Mobility:    · Patient completed Scooting/Bridging with contact guard assistance with increased time for anterior scooting towards EOB.   · Patient completed Supine to Sit with CGA <> min A with verbal cues for maintaining sternal precautions and HOB elevated     Functional Mobility/Transfers:  · Patient completed x 3 Sit <> Stand Transfer from EOB and bedside chair with minimum assistance  with  no assistive device   · 1st trial from EOB pt required min A x1 person assist   · 2nd and 3rd  trial from bedside chair,pt required min A x 2 persons with HHA once in standing.    · Functional Mobility: Pt engaging in functional mobility to simulate household distances approx 10ft +50ft  with min A using B HHA in order to maximize functional activity tolerance and standing balance required for engagement in occupations of choice.   · Chair to follow throughout   · Verbal cues for upright posture  · Pt with no overt LOB but mild instability   · Verbal cues for controlled pursed lip breathing   · Pt on 3L oxygen  · Pt reported 15 on the RPE scale     Activities of Daily Living:  · Grooming: contact " guard assistance    · Pt tolerated standing at sink for ~ 3mins with CGA for standing balance and completed oral care and washed face   · Verbal cues for controled pursed lip breathing   · Pt noted to push through L UE while standing at sink requiring verbal cues for maintaining sternal precautions.   · Pt required seated rest break to finish completing self -care   · Upper Body Dressing: minimum assistance donning gown like robe. Surgical bra on.     [facility] 6 Click ADL: 16    Treatment & Education:  - Pt educated on role of OT, POC, and goals for therapy.    - Educated pt on being appropriate to transfer with nsg and PCT with min A for sit<>stand transfers and min A for functional mobility   - Pt recalled 3/3 sternal precautions with increased time and prompting provided.   - Time provided for therapeutic counseling and discussion of health disposition.   - Pt  informed on Rehab qualifications. Pt's  asked what are the qualification for going home rather than going to IP Rehab; answered provided within OT scope of practice. Pt's  verbalized understanding  - Importance of OOB ax's with staff member assistance and sitting OOB majority of day.   - Pt completed ADLs and functional mobility for treatment session as noted above   - Pt and pt's  verbalized understanding. Pt and pt's  expressed no further concerns/questions.  - whiteboard updated     Patient left up in chair with all lines intact, call button in reach, RN notified and Pt's , mother-in-law, and PCT presentEducation:      GOALS:   Multidisciplinary Problems     Occupational Therapy Goals        Problem: Occupational Therapy Goal    Goal Priority Disciplines Outcome Interventions   Occupational Therapy Goal     OT, PT/OT Ongoing, Progressing    Description:  Goals to be met by: 2/1/20     Patient will increase functional independence with ADLs by performing:    Feeding with [place].  UE Dressing with Minimal  Assistance.  LE Dressing with Moderate Assistance.  Grooming while standing at sink with Contact Guard Assistance. - progressing towards   Toileting from toilet with Moderate Assistance for hygiene and clothing management. -updated 1/27/2020  Toilet transfer to toilet with Minimal Assistance. -updated 1/27/2020                        Time Tracking:     OT Date of Treatment: 01/31/20  OT Start Time: 0905  OT Stop Time: 0941  OT Total Time (min): 36 min (co-treat with PT)    Billable Minutes:Self Care/Home Management 15  Therapeutic Activity 10    Sisi Alvarez, OT  1/31/2020

## 2020-01-31 NOTE — SUBJECTIVE & OBJECTIVE
Interval History: NAEON.  was absent upon entry to room. Patient was smiling and pleasant. Said she is feeling much better today. Still SOB with exertion. Continues to improve with PT. Will give a trial of lasix to assess renal recovery as patient continues to make more urine. Spoke with nephrology team who agree patient is stable for rehab.     Review of Systems   Constitution: Negative for malaise/fatigue.   Cardiovascular: Negative for chest pain, claudication, dyspnea on exertion, irregular heartbeat, leg swelling and palpitations.   Respiratory: Negative for cough and shortness of breath.    Hematologic/Lymphatic: Negative for bleeding problem.   Gastrointestinal: Negative for abdominal pain.   Genitourinary: Negative for dysuria.   Neurological: Negative for headaches and weakness.     Medications:  Continuous Infusions:   heparin (porcine)       Scheduled Meds:   sodium chloride 0.9%   Intravenous Once    amiodarone  400 mg Oral TID    aspirin  81 mg Oral Daily    atorvastatin  80 mg Oral QHS    cefTRIAXone (ROCEPHIN) IVPB  1 g Intravenous Q12H    clopidogreL  75 mg Oral Daily    epoetin nina-ebpx (RETACRIT) injection  4,000 Units Intravenous Every Tues, Thurs, Sat    escitalopram oxalate  10 mg Oral Daily    heparin (porcine)  5,000 Units Subcutaneous Q8H    insulin aspart U-100  4 Units Subcutaneous TIDWM    metoprolol succinate  50 mg Oral Daily    pantoprazole  40 mg Oral BID    polyethylene glycol  17 g Oral Daily    scopolamine  1 patch Transdermal Q3 Days    sevelamer carbonate  800 mg Oral TID WM     PRN Meds:acetaminophen, albuterol-ipratropium, bisacodyL, Dextrose 10% Bolus, Dextrose 10% Bolus, glucagon (human recombinant), glucose, glucose, heparin (porcine), heparin (porcine), HYDROmorphone, insulin aspart U-100, metoclopramide HCl, metoprolol, ondansetron, oxyCODONE, oxyCODONE     Objective:     Vital Signs (Most Recent):  Temp: 98 °F (36.7 °C) (01/31/20 1300)  Pulse: (!)  56 (01/31/20 1300)  Resp: 18 (01/31/20 1300)  BP: 138/69 (01/31/20 1300)  SpO2: 99 % (01/31/20 1300) Vital Signs (24h Range):  Temp:  [97.7 °F (36.5 °C)-98.4 °F (36.9 °C)] 98 °F (36.7 °C)  Pulse:  [54-64] 56  Resp:  [16-18] 18  SpO2:  [96 %-99 %] 99 %  BP: (116-138)/(55-72) 138/69     Weight: 96.1 kg (211 lb 13.8 oz)  Body mass index is 36.37 kg/m².    SpO2: 99 %  O2 Device (Oxygen Therapy): nasal cannula    Intake/Output - Last 3 Shifts       01/29 0700 - 01/30 0659 01/30 0700 - 01/31 0659 01/31 0700 - 02/01 0659    P.O. 780 480 360    Other  650     Total Intake(mL/kg) 780 (8.1) 1130 (11.8) 360 (3.7)    Urine (mL/kg/hr) 0 (0) 300 (0.1)     Other  3150     Stool  0     Total Output 0 3450     Net +780 -2320 +360           Urine Occurrence 2 x 1 x     Stool Occurrence 0 x 1 x           Lines/Drains/Airways     Central Venous Catheter Line                 Hemodialysis Catheter 01/27/20 1624 left subclavian 3 days          Peripheral Intravenous Line                 Midline Catheter Insertion/Assessment  - Single Lumen 01/21/20 1016 Right basilic vein (medial side of arm) 18g x 10cm 10 days                Physical Exam   Constitutional: She is oriented to person, place, and time. She appears well-developed and well-nourished. No distress.   HENT:   Head: Normocephalic and atraumatic.   Eyes: Pupils are equal, round, and reactive to light. EOM are normal.   Neck: Normal range of motion. No JVD present.   Cardiovascular: Normal rate, regular rhythm and normal pulses.   Pulmonary/Chest: Effort normal. No respiratory distress.   On 3L nasal cannula    Abdominal: Soft. She exhibits no distension.   Musculoskeletal: Normal range of motion.   Neurological: She is alert and oriented to person, place, and time.   Skin: Skin is warm, dry and intact. Capillary refill takes less than 2 seconds. She is not diaphoretic.   Midline sternal incision with some purulent drainage. Gauze dressing in place    Psychiatric: She has a normal  mood and affect. Her speech is normal and behavior is normal. Judgment and thought content normal.   Vitals reviewed.      Significant Labs:  BMP:   Recent Labs   Lab 01/31/20  0414   *   *   K 4.1   CL 99   CO2 20*   BUN 21*   CREATININE 3.9*   CALCIUM 9.1   MG 2.1     CBC:   Recent Labs   Lab 01/31/20 0414   WBC 10.46   RBC 2.82*   HGB 8.3*   HCT 27.5*   *   MCV 98   MCH 29.4   MCHC 30.2*       Significant Diagnostics:  I have reviewed all pertinent imaging results/findings within the past 24 hours.

## 2020-01-31 NOTE — PLAN OF CARE
SW informed by Abram with [facility] that pt was denied rehab by their medical director stating that she can get her needs met at a lower level of care.  [id number] scheduled with VENANCIO Cota for today by 5:00pm with request for asap.    Jenna Bonilla, IKER  [facility]  [phone]

## 2020-01-31 NOTE — PLAN OF CARE
Pt maintained free from falls/ trauma/ injuries. Pt has skin tear on the right upper posterior thigh, cleaned and foam dressing applied. Pt also developed a stage 2 pressure ulcer in her gluteal cleft, wound care ordered, waffle mattress obtained. Pt was at dialysis from 0800 till 1320. All her medication except from amiodarone were given after she came back. Blood glucose monitoring continue, coverage was given once this am. Pt worked with PT today. She sit up on the side of the bed for dinner. Pt has not have a bowel movement since 8/23, offered PRN suppository but refused. Plan of care reviewed. Pt verbalized understanding. All questions and concerns addressed. Will continue to monitor.

## 2020-01-31 NOTE — PT/OT/SLP PROGRESS
Physical Therapy Treatment    Patient Name:  Suyapa Connelly   MRN:  [id number]  Admitting Diagnosis:  Acute coronary syndrome   Recent Surgery: Procedure(s) (LRB):  Insertion, Catheter, Central Venous, Hemodialysis (N/A) 4 Days Post-Op  Admit Date: 1/2/2020  Length of Stay: 29 days    Recommendations:     Discharge Recommendations:  rehabilitation facility   Discharge Equipment Recommendations: other (see comments)(tbd pending progress)   Barriers to discharge: None    Assessment:     Suyapa Connelly is a 54 y.o. female admitted with a medical diagnosis of Acute coronary syndrome.  She presents with the following impairments/functional limitations:  weakness, impaired endurance, impaired self care skills, impaired functional mobilty, gait instability, impaired cognition, decreased lower extremity function, decreased upper extremity function, decreased safety awareness, impaired cardiopulmonary response to activity, impaired skin. Pt tolerated session well today. Pt found seated upright with HOB elevated upon PT entrance into room. Pt with improved eye contact with PT/OT on this date but still minimally conversive throughout session with  answering most questions posed to pt. Pt with improved endurance on this date with improved total ambulation distance on this date. Pt appeared motivated to improve distance on this date as she was asked if she wanted to sit at previous distance marker and pt expressed she wanted to ambulate further. Pt's  told PT that CTS informed pt and himself if pt improved enough she would not need IPR. Pt's  then proceeded to ask what goals the pt would need to meet to go home instead of go to rehab. Pt's  was told that pt would need to ambulate 250 feet and require no assistance with any aspects of mobility. Pt's  stated understanding.     After session PT was pulled aside by pt's RN. RN informed PT that after pt's  left the room the patient was  ambulating in room without assist. RN informed that pt usually requires assist x 2 for safety as well as Min A to remain upright. Pt potentially could be self-limiting with  present while mobilizing potentially due to pt/'s relationship or 's personality.    Rehab Prognosis: Good; patient would benefit from acute skilled PT services to address these deficits and reach maximum level of function.    Recent Surgery: Procedure(s) (LRB):  Insertion, Catheter, Central Venous, Hemodialysis (N/A) 4 Days Post-Op    Plan:     During this hospitalization, patient to be seen 5 x/week to address the identified rehab impairments via therapeutic activities, gait training, therapeutic exercises, neuromuscular re-education and progress toward the following goals:    · Plan of Care Expires:  02/26/20    Subjective     RN notified prior to session.  and Mother In LAw present upon PT entrance into room.    Chief Complaint: pt stated she was feeling better than yesterday  Patient/Family Comments/goals: get better  Pain/Comfort:  · Pain Rating 1: 0/10  · Pain Rating Post-Intervention 1: 0/10      Objective:     Additional staff present: OT for co-treatment due to pt's limited cardiopulmonary endurance and response to activity    Patient found HOB elevated with: telemetry, oxygen   Mental Status: Patient is oriented to AxOx4 and follows multistep commands. Patient is Alert, Cooperative and Flat affect during session.    General Precautions: Standard, Cardiac aspiration, fall, sternal   Orthopedic Precautions:N/A   Braces: N/A   Body mass index is 36.37 kg/m².  Oxygen Device: Nasal Cannula 3L    Outcome Measures:  AM-PAC 6 CLICK MOBILITY  Turning over in bed (including adjusting bedclothes, sheets and blankets)?: 3  Sitting down on and standing up from a chair with arms (e.g., wheelchair, bedside commode, etc.): 3  Moving from lying on back to sitting on the side of the bed?: 3  Moving to and from a bed to a  chair (including a wheelchair)?: 3  Need to walk in hospital room?: 3  Climbing 3-5 steps with a railing?: 2  Basic Mobility Total Score: 17     Functional Mobility:    Bed Mobility:   · Supine to Sit: minimum assistance; from Lt side of bed  · Scooting anteriorly to EOB to have both feet planted on floor: contact guard assistance    Sitting Balance at Edge of Bed:   Assistance Level Required: Supervision    Transfers:   · Sit <> Stand Transfer: minimum assistance with hand-held assist   · Stand <> Sit Transfer: minimum assistance with hand-held assist   · [id number] from EOB  · Sit <> Stand Transfer: minimum assistance and of 2 persons with hand-held assist   · Stand <> Sit Transfer: minimum assistance and of 2 persons with hand-held assist   · [id number] from bedside chair    Standing Balance:   Assistance Level Required: Contact Guard Assistance   Patient used: no assistive device    Time: 3 minutes   Postural deviations noted: slouched posture, rounded shoulders and forward head   Comments: Pt req'd vc's throughout time at sink to maintain sternal precautions as pt was attempting to WB through Lt arm. Pt with good ankle strategy in response to internal perturbations and reaching outside of MARIO while performing standing balance activity at sink.      Gait:  · Patient ambulated: 10 ft; 50 ft (rest break between trials)  · Patient required: minimal assist  · Patient used:  hand-held assist and of 2 persons   · Gait Pattern observed: reciprocal gait  · Gait Deviation(s): occasional unsteady gait, decreased step length, wide base of support, decreased weight shift, shuffle gait, flexed posture and decreased nirav  · Impairments due to: impaired balance, decreased strength and decreased endurance  · Portable Supplemental O2 3L utilized  · all lines remained intact throughout ambulation trial  · Chair follow for patient safety  · Comments: Goal of 50 ft, with visual landmark, suggested to patient before trial. Pt  was agreeable. At 40 ft of 2nd trial, distance pt has previously ambulated, pt req'd standing rest break. Ptwas asked if she would like to sit and pt shook her head no. Pt was able to ambulate 10 ft more before sitting. Pt with no overt LOB but generally unsteady throughout. Pt req'd Min A to remain upright throughout. Pt req'd vc's throughout for upright posture and forward gaze for scanning environment. Cueing for pursed lip breathing throughout trial req'd.    Education:   Time provided for education, counseling and discussion of health disposition in regards to patient's current status   All questions answered within PT scope of practice and to patient's satisfaction   PT role in POC to address current functional deficits   Pt educated on proper body mechanics, safety techniques, and energy conservation with PT facilitation and cueing throughout session   Call nursing/pct to transfer to chair/use bathroom. Pt stated understanding.   Whiteboard updated with pt's current mobility status documented above   Safe to perform step transfer to/from chair/bedside commode Min A and HHA x  2 w/ nursing/PCT present   Pt to ambulate 2-3x/day Min A  and HHA x 2 with nsg/pct in order to maintain functional mobility.  informed and in agreement   Importance of OOB tolerance prn hrs/day to improve lung ventilation and expansion as well as strengthen postural musculature   Discussion of discharge disposition with  and pt. See assessment for details of goals set for pt to d/c home vs IPR.    Patient left up in chair with all lines intact, call button in reach, RN notified and  and mother in law present.    GOALS:   Multidisciplinary Problems     Physical Therapy Goals        Problem: Physical Therapy Goal    Goal Priority Disciplines Outcome Goal Variances Interventions   Physical Therapy Goal     PT, PT/OT Ongoing, Progressing     Description:  Goals to be met by: 2/5/20    Patient will increase  functional independence with mobility by performin. Supine to sit with Contact Guard Assistance -not met  2. Sit to stand transfer with Contact Guard Assistance -not met  3. Gait  x 150 feet with Contact Guard Assistance -not met  4. Ascend/descend 5 stair with left Handrails Contact Guard Assistance -not met                  Multidisciplinary Problems (Resolved)        Problem: Physical Therapy Goal    Goal Priority Disciplines Outcome Goal Variances Interventions   Physical Therapy Goal   (Resolved)     PT, PT/OT Met     Description:  Eval only                  Time Tracking:     PT Received On: 20  PT Start Time: 906     PT Stop Time: 941  PT Total Time (min): 35 min       Billable Minutes:   · Gait Training 20 and Therapeutic Activity 10    Treatment Type: Treatment  PT/PTA: PT       Courtney Meier PT, DPT  2020  Pager: [phone]

## 2020-01-31 NOTE — ASSESSMENT & PLAN NOTE
At time of re-consult: Ms Connelly is a 55 y/o female who is s/p CABG. Nephrology re-consulted for ROSLYN, decreased UOP, and concern for volume overload. Patient is now anuric with sCr trending up (4.4 on 1/16/20). Patient with metabolic acidosis on ABG/serum CO2 of 19. Patient now with concern for volume overload and now intubated after progressively increased oxygen requirements. Concern for pulmonary edema on CXR.     Plan/recommendations:   -No urgent need for HD at this time. Patient with some signs of renal recovery. Increased UOP overnight. Patient with complaints of mild shortness of breath.   -Recommend Lasix  mg IV x 1 dose   -Will tentatively plan to hold HD tomorrow demanding on response to Lasix challenge   -Renal diet   -Strict I/O's   -Renally dose medications   -Avoid nephrotoxic medications  -Daily renal function panels   -Maintain Hgb >7.0  -Will discuss with Dr. Lange

## 2020-02-01 LAB
ALBUMIN SERPL BCP-MCNC: 2.3 G/DL (ref 3.5–5.2)
ALP SERPL-CCNC: 204 U/L (ref 55–135)
ALT SERPL W/O P-5'-P-CCNC: 41 U/L (ref 10–44)
ANION GAP SERPL CALC-SCNC: 16 MMOL/L (ref 8–16)
AST SERPL-CCNC: 30 U/L (ref 10–40)
BASOPHILS # BLD AUTO: 0.09 K/UL (ref 0–0.2)
BASOPHILS NFR BLD: 1 % (ref 0–1.9)
BILIRUB SERPL-MCNC: 0.7 MG/DL (ref 0.1–1)
BUN SERPL-MCNC: 25 MG/DL (ref 6–20)
CALCIUM SERPL-MCNC: 9.3 MG/DL (ref 8.7–10.5)
CHLORIDE SERPL-SCNC: 99 MMOL/L (ref 95–110)
CO2 SERPL-SCNC: 16 MMOL/L (ref 23–29)
CREAT SERPL-MCNC: 4.5 MG/DL (ref 0.5–1.4)
DIFFERENTIAL METHOD: ABNORMAL
EOSINOPHIL # BLD AUTO: 0.1 K/UL (ref 0–0.5)
EOSINOPHIL NFR BLD: 1.1 % (ref 0–8)
ERYTHROCYTE [DISTWIDTH] IN BLOOD BY AUTOMATED COUNT: 16.4 % (ref 11.5–14.5)
EST. GFR  (AFRICAN AMERICAN): 12.1 ML/MIN/1.73 M^2
EST. GFR  (NON AFRICAN AMERICAN): 10.5 ML/MIN/1.73 M^2
GLUCOSE SERPL-MCNC: 97 MG/DL (ref 70–110)
HCT VFR BLD AUTO: 27.9 % (ref 37–48.5)
HGB BLD-MCNC: 8.1 G/DL (ref 12–16)
IMM GRANULOCYTES # BLD AUTO: 0.04 K/UL (ref 0–0.04)
IMM GRANULOCYTES NFR BLD AUTO: 0.5 % (ref 0–0.5)
INR PPP: 1.7 (ref 0.8–1.2)
LYMPHOCYTES # BLD AUTO: 1.4 K/UL (ref 1–4.8)
LYMPHOCYTES NFR BLD: 16 % (ref 18–48)
MAGNESIUM SERPL-MCNC: 2.1 MG/DL (ref 1.6–2.6)
MCH RBC QN AUTO: 29.8 PG (ref 27–31)
MCHC RBC AUTO-ENTMCNC: 29 G/DL (ref 32–36)
MCV RBC AUTO: 103 FL (ref 82–98)
MONOCYTES # BLD AUTO: 1.2 K/UL (ref 0.3–1)
MONOCYTES NFR BLD: 14.1 % (ref 4–15)
NEUTROPHILS # BLD AUTO: 5.9 K/UL (ref 1.8–7.7)
NEUTROPHILS NFR BLD: 67.3 % (ref 38–73)
NRBC BLD-RTO: 0 /100 WBC
PHOSPHATE SERPL-MCNC: 4.1 MG/DL (ref 2.7–4.5)
PLATELET # BLD AUTO: 403 K/UL (ref 150–350)
PMV BLD AUTO: 10.7 FL (ref 9.2–12.9)
POCT GLUCOSE: 106 MG/DL (ref 70–110)
POCT GLUCOSE: 242 MG/DL (ref 70–110)
POCT GLUCOSE: 256 MG/DL (ref 70–110)
POCT GLUCOSE: 86 MG/DL (ref 70–110)
POTASSIUM SERPL-SCNC: 4 MMOL/L (ref 3.5–5.1)
PROT SERPL-MCNC: 8.2 G/DL (ref 6–8.4)
PROTHROMBIN TIME: 16.2 SEC (ref 9–12.5)
RBC # BLD AUTO: 2.72 M/UL (ref 4–5.4)
SODIUM SERPL-SCNC: 131 MMOL/L (ref 136–145)
WBC # BLD AUTO: 8.79 K/UL (ref 3.9–12.7)

## 2020-02-01 PROCEDURE — 84100 ASSAY OF PHOSPHORUS: CPT

## 2020-02-01 PROCEDURE — 63600175 PHARM REV CODE 636 W HCPCS: Performed by: STUDENT IN AN ORGANIZED HEALTH CARE EDUCATION/TRAINING PROGRAM

## 2020-02-01 PROCEDURE — 27000221 HC OXYGEN, UP TO 24 HOURS

## 2020-02-01 PROCEDURE — 25000003 PHARM REV CODE 250: Performed by: PHYSICIAN ASSISTANT

## 2020-02-01 PROCEDURE — 80053 COMPREHEN METABOLIC PANEL: CPT

## 2020-02-01 PROCEDURE — 85025 COMPLETE CBC W/AUTO DIFF WBC: CPT

## 2020-02-01 PROCEDURE — 99232 PR SUBSEQUENT HOSPITAL CARE,LEVL II: ICD-10-PCS | Mod: ,,, | Performed by: NURSE PRACTITIONER

## 2020-02-01 PROCEDURE — 36415 COLL VENOUS BLD VENIPUNCTURE: CPT

## 2020-02-01 PROCEDURE — 90935 HEMODIALYSIS ONE EVALUATION: CPT | Mod: ,,, | Performed by: INTERNAL MEDICINE

## 2020-02-01 PROCEDURE — 25000003 PHARM REV CODE 250: Performed by: STUDENT IN AN ORGANIZED HEALTH CARE EDUCATION/TRAINING PROGRAM

## 2020-02-01 PROCEDURE — 85610 PROTHROMBIN TIME: CPT

## 2020-02-01 PROCEDURE — 63600175 PHARM REV CODE 636 W HCPCS: Performed by: PHYSICIAN ASSISTANT

## 2020-02-01 PROCEDURE — 63600175 PHARM REV CODE 636 W HCPCS: Performed by: NURSE PRACTITIONER

## 2020-02-01 PROCEDURE — 25000003 PHARM REV CODE 250: Performed by: THORACIC SURGERY (CARDIOTHORACIC VASCULAR SURGERY)

## 2020-02-01 PROCEDURE — 99900035 HC TECH TIME PER 15 MIN (STAT)

## 2020-02-01 PROCEDURE — 99232 SBSQ HOSP IP/OBS MODERATE 35: CPT | Mod: ,,, | Performed by: NURSE PRACTITIONER

## 2020-02-01 PROCEDURE — 94761 N-INVAS EAR/PLS OXIMETRY MLT: CPT

## 2020-02-01 PROCEDURE — 20600001 HC STEP DOWN PRIVATE ROOM

## 2020-02-01 PROCEDURE — 94664 DEMO&/EVAL PT USE INHALER: CPT

## 2020-02-01 PROCEDURE — 90935 PR HEMODIALYSIS, ONE EVALUATION: ICD-10-PCS | Mod: ,,, | Performed by: INTERNAL MEDICINE

## 2020-02-01 PROCEDURE — 83735 ASSAY OF MAGNESIUM: CPT

## 2020-02-01 PROCEDURE — 90935 HEMODIALYSIS ONE EVALUATION: CPT

## 2020-02-01 PROCEDURE — 25000003 PHARM REV CODE 250: Performed by: NURSE PRACTITIONER

## 2020-02-01 RX ORDER — FUROSEMIDE 10 MG/ML
80 INJECTION INTRAMUSCULAR; INTRAVENOUS 2 TIMES DAILY
Status: DISCONTINUED | OUTPATIENT
Start: 2020-02-01 | End: 2020-02-02

## 2020-02-01 RX ORDER — METOLAZONE 2.5 MG/1
2.5 TABLET ORAL 2 TIMES DAILY
Status: DISCONTINUED | OUTPATIENT
Start: 2020-02-01 | End: 2020-02-02

## 2020-02-01 RX ADMIN — AMIODARONE HYDROCHLORIDE 400 MG: 200 TABLET ORAL at 10:02

## 2020-02-01 RX ADMIN — FUROSEMIDE 80 MG: 10 INJECTION, SOLUTION INTRAMUSCULAR; INTRAVENOUS at 06:02

## 2020-02-01 RX ADMIN — SEVELAMER CARBONATE 800 MG: 800 TABLET, FILM COATED ORAL at 04:02

## 2020-02-01 RX ADMIN — HEPARIN SODIUM 1000 UNITS: 1000 INJECTION, SOLUTION INTRAVENOUS; SUBCUTANEOUS at 03:02

## 2020-02-01 RX ADMIN — ATORVASTATIN CALCIUM 80 MG: 20 TABLET, FILM COATED ORAL at 08:02

## 2020-02-01 RX ADMIN — INSULIN ASPART 4 UNITS: 100 INJECTION, SOLUTION INTRAVENOUS; SUBCUTANEOUS at 04:02

## 2020-02-01 RX ADMIN — PANTOPRAZOLE SODIUM 40 MG: 40 TABLET, DELAYED RELEASE ORAL at 10:02

## 2020-02-01 RX ADMIN — ASPIRIN 81 MG CHEWABLE TABLET 81 MG: 81 TABLET CHEWABLE at 10:02

## 2020-02-01 RX ADMIN — HEPARIN SODIUM 5000 UNITS: 5000 INJECTION, SOLUTION INTRAVENOUS; SUBCUTANEOUS at 08:02

## 2020-02-01 RX ADMIN — CEFTRIAXONE SODIUM 1 G: 1 INJECTION, POWDER, FOR SOLUTION INTRAMUSCULAR; INTRAVENOUS at 10:02

## 2020-02-01 RX ADMIN — INSULIN ASPART 4 UNITS: 100 INJECTION, SOLUTION INTRAVENOUS; SUBCUTANEOUS at 09:02

## 2020-02-01 RX ADMIN — PANTOPRAZOLE SODIUM 40 MG: 40 TABLET, DELAYED RELEASE ORAL at 08:02

## 2020-02-01 RX ADMIN — AMIODARONE HYDROCHLORIDE 400 MG: 200 TABLET ORAL at 08:02

## 2020-02-01 RX ADMIN — CEFTRIAXONE SODIUM 1 G: 1 INJECTION, POWDER, FOR SOLUTION INTRAMUSCULAR; INTRAVENOUS at 08:02

## 2020-02-01 RX ADMIN — FUROSEMIDE 80 MG: 10 INJECTION, SOLUTION INTRAMUSCULAR; INTRAVENOUS at 10:02

## 2020-02-01 RX ADMIN — INSULIN ASPART 3 UNITS: 100 INJECTION, SOLUTION INTRAVENOUS; SUBCUTANEOUS at 09:02

## 2020-02-01 RX ADMIN — METOLAZONE 2.5 MG: 2.5 TABLET ORAL at 08:02

## 2020-02-01 RX ADMIN — HEPARIN SODIUM 5000 UNITS: 5000 INJECTION, SOLUTION INTRAVENOUS; SUBCUTANEOUS at 06:02

## 2020-02-01 RX ADMIN — CLOPIDOGREL BISULFATE 75 MG: 75 TABLET, FILM COATED ORAL at 10:02

## 2020-02-01 RX ADMIN — METOLAZONE 2.5 MG: 2.5 TABLET ORAL at 09:02

## 2020-02-01 RX ADMIN — EPOETIN ALFA-EPBX 4000 UNITS: 4000 INJECTION, SOLUTION INTRAVENOUS; SUBCUTANEOUS at 03:02

## 2020-02-01 RX ADMIN — ESCITALOPRAM 10 MG: 5 TABLET, FILM COATED ORAL at 10:02

## 2020-02-01 NOTE — PROGRESS NOTES
"[facility]  Endocrinology  Progress Note    Admit Date: 2020     Reason for Consult: Management of T2DM, Hyperglycemia     Surgical Procedure and Date: n/a    Diabetes diagnosis year:      Home Diabetes Medications:  Lantus 50 units daily and novolog 9 units with meals   Lab Results   Component Value Date    HGBA1C 9.7 (H) 2020         How often checking glucose at home? 1-3 x day   BG readings on regimen: 100-200 typically; sometimes in 300s   Hypoglycemia on the regimen?  Yes occasionally - usually overnight   Missed doses on regimen?  Yes    Diabetes Complications include:     Hyperglycemia, Hypoglycemia  and Diabetic peripheral neuropathy     Complicating diabetes co morbidities:   History of MI and CHF      HPI:   Patient is a 54 y.o. female with a diagnosis of uncontrolled type 2 DM. Also with CHF, PAD, HTN, HLD, and CAD. Patient presented with chest pain and diaphoresis. Also with nauseated, diaphoretic, a mild headache, epigastric pain, with a vague "heart burn" like feeling in her chest. Patient admitted for treatment and further workup. Endocrinology consulted for BG/ DM management.             Interval HPI:   Overnight events: NAEON. BG reasonably controlled on current SQ insulin regimen. Creatinine 4.5.  Eatin%  Nausea: No  Hypoglycemia and intervention: No  Fever: No  TPN and/or TF: No  If yes, type of TF/TPN and rate: none    /60 (BP Location: Right arm, Patient Position: Lying)   Pulse 63   Temp 97.4 °F (36.3 °C) (Oral)   Resp 18   Ht 5' 4" (1.626 m)   Wt 92.3 kg (203 lb 8.5 oz)   LMP 2015 (Exact Date)   SpO2 98%   Breastfeeding? No   BMI 34.94 kg/m²      Labs Reviewed and Include    Recent Labs   Lab 20  0300   GLU 97   CALCIUM 9.3   ALBUMIN 2.3*   PROT 8.2   *   K 4.0   CO2 16*   CL 99   BUN 25*   CREATININE 4.5*   ALKPHOS 204*   ALT 41   AST 30   BILITOT 0.7     Lab Results   Component Value Date    WBC 8.79 2020    HGB " 8.1 (L) 02/01/2020    HCT 27.9 (L) 02/01/2020     (H) 02/01/2020     (H) 02/01/2020     No results for input(s): TSH, FREET4 in the last 168 hours.  Lab Results   Component Value Date    HGBA1C 9.7 (H) 01/03/2020       Nutritional status:   Body mass index is 34.94 kg/m².  Lab Results   Component Value Date    ALBUMIN 2.3 (L) 02/01/2020    ALBUMIN 2.4 (L) 01/31/2020    ALBUMIN 2.6 (L) 01/30/2020     No results found for: PREALBUMIN    Estimated Creatinine Clearance: 15.9 mL/min (A) (based on SCr of 4.5 mg/dL (H)).    Accu-Checks  Recent Labs     01/30/20  0747 01/30/20  1246 01/30/20  1651 01/30/20  2213 01/31/20  0940 01/31/20  1311 01/31/20  1741 01/31/20  2052 02/01/20  0906 02/01/20  1154   POCTGLUCOSE 204* 123* 164* 140* 156* 141* 132* 113* 256* 86       Current Medications and/or Treatments Impacting Glycemic Control  Immunotherapy:    Immunosuppressants     None        Steroids:   Hormones (From admission, onward)    None        Pressors:    Autonomic Drugs (From admission, onward)    Start     Stop Route Frequency Ordered    01/16/20 1430  succinylcholine (ANECTINE) 20 mg/mL injection     Note to Pharmacy:  Created by [employer] override    01/17 0244   01/16/20 1430    01/14/20 1853  rocuronium 10 mg/mL injection     Note to Pharmacy:  Created by cabinet override    01/15 0659   01/14/20 1853        Hyperglycemia/Diabetes Medications:   Antihyperglycemics (From admission, onward)    Start     Stop Route Frequency Ordered    01/29/20 1230  insulin aspart U-100 pen 4 Units      -- SubQ 3 times daily with meals 01/29/20 1118    01/22/20 1531  insulin aspart U-100 pen 0-5 Units      -- SubQ Before meals & nightly PRN 01/22/20 1431          ASSESSMENT and PLAN    * Acute coronary syndrome  Managed per primary team  Avoid hypoglycemia        Type 2 diabetes mellitus with hyperglycemia, with long-term current use of insulin  BG goal 140 - 180     Will continue to hold basal insulin at this time. Basal  "insulin needs most likely masked by poor kidney function at this time.   Continue Novolog 4 units TIDWM. 50% reduction from previous hospital MDI regimen. Kidney function remains poor.  (HOLD if eating < 25% at meals)   Low Dose SQ Insulin Correction Scale.  BG Monitoring AC/HS     ** Please call Endocrine for any BG related issues **  ** Please notify Endocrine for any change and/or advance in diet**      Discharge Planning:     TBD. Please notify endocrinology prior to discharge. Patient will need adjustments to home insulin regimen.     Tentative:     Levemir dose TBD    Novolog dose TBD    Low Dose SQ Insulin Correction Scale.    BG Monitoring AC/HS     PRN Insurance preferred diabetes testing supplies    PRN Ultra-Fine Junie Pen Needles 4mm x 32 G (5/32" x 0.23mm).     Patient request outpatient follow-up with [facility] for continued DM management.          ROSLYN (acute kidney injury)  Titrate insulin slowly to avoid hypoglycemia as the risk of hypoglycemia increases with decreased creatinine clearance.            Mixed hyperlipidemia  Managed per primary team  Condition may cause insulin resistance         Essential hypertension  Managed per primary team  Condition may cause insulin resistance         Acute on chronic combined systolic (congestive) and diastolic (congestive) heart failure  Optimize glucose control and  avoid hypoglycemia          CAD (coronary artery disease)  Optimize glucose control and  avoid hypoglycemia            Mary Baptiste NP  Endocrinology  [facility]  "

## 2020-02-01 NOTE — NURSING TRANSFER
Nursing Transfer Note      2/1/2020     Transfer To: Dialysis    Transfer via wheelchair    Transfer with 3 L/min to O2, cardiac monitoring    Transported by transport tech    Medicines sent: insulin     Chart send with patient: Yes    Notified: mother and father at bedside

## 2020-02-01 NOTE — PROGRESS NOTES
[facility]  Nephrology  Progress Note    Patient Name: Suyapa Connelly  MRN: [id number]  Admission Date: 1/2/2020  Hospital Length of Stay: 30 days  Attending Provider: Huang Altamirano MD   Primary Care Physician: Erlinda Rahman NP  Principal Problem:Acute coronary syndrome    Subjective:     HPI: 55 y/o AAF with known medical medical issues of chronic combined systolic and diastolic HF (EF 35% Grade II DD), CAD S/P MI 2010, Angiogram 12/18/19 mid LAD 90% stenosis, mid OM2 75% stenosis, and Ostial D1 70% stenosis LVDEP 25 mmHg, PAD S/P PTA with stents BLE 2015, IDDM who presented to the ED with SOB and chest pain, found to have an NSTEMI which was treated medically with asa, heparin gtt. CTA of the lower extremities performed on 01/03 to investigate vasculature useful for CABG. Creatinine 3.6 up from a baseline of 1.2. She reports decreased urine output since the CTA but no increased swelling of the lower extremities. No SOB. She an episode of hypotension on 01/04 with a systolic of 80. She was on Lasix IV BID, last dose on 01/04 at 9:00 am. Nephrology consulted for oliguric ROSLYN.      Interval History: Patient seen and examined at bedside, bicarbonate trending down from yesterday. Last HD on 01/30. Unmeasured urine output after lasix challenge yesterday.     Review of patient's allergies indicates:   Allergen Reactions    Contrast media      Kidney injury    Pcn [penicillins]      Rash; tolerated ceftriaxone on 1/13/20     Current Facility-Administered Medications   Medication Frequency    acetaminophen tablet 650 mg Q6H PRN    albuterol-ipratropium 2.5 mg-0.5 mg/3 mL nebulizer solution 3 mL Q6H PRN    amiodarone tablet 400 mg TID    aspirin chewable tablet 81 mg Daily    atorvastatin tablet 80 mg QHS    bisacodyl suppository 10 mg Q12H PRN    cefTRIAXone injection 1 g Q12H    clopidogrel tablet 75 mg Daily    dextrose 10% (D10W) Bolus PRN    dextrose 10% (D10W) Bolus PRN     epoetin nina-epbx injection 4,000 Units Every Tues, Thurs, Sat    escitalopram oxalate tablet 10 mg Daily    furosemide injection 80 mg BID    glucagon (human recombinant) injection 1 mg PRN    glucose chewable tablet 16 g PRN    glucose chewable tablet 24 g PRN    heparin (porcine) injection 1,000 Units PRN    heparin (porcine) injection 5,000 Units Q8H    heparin infusion 1,000 units/500 ml in 0.9% NaCl (pressure line flush) Continuous PRN    HYDROmorphone injection 0.2 mg Q1H PRN    insulin aspart U-100 pen 0-5 Units QID (AC + HS) PRN    insulin aspart U-100 pen 4 Units TIDWM    metoclopramide HCl injection 10 mg Q6H PRN    metOLazone tablet 2.5 mg BID    metoprolol injection 5 mg Q5 Min PRN    metoprolol succinate (TOPROL-XL) 24 hr tablet 50 mg Daily    ondansetron injection 4 mg Q6H PRN    oxyCODONE immediate release tablet 10 mg Q4H PRN    oxyCODONE immediate release tablet 5 mg Q4H PRN    pantoprazole EC tablet 40 mg BID    polyethylene glycol packet 17 g Daily    scopolamine 1.3-1.5 mg (1 mg over 3 days) 1 patch Q3 Days    sevelamer carbonate tablet 800 mg TID WM       Objective:     Vital Signs (Most Recent):  Temp: 97.4 °F (36.3 °C) (02/01/20 1144)  Pulse: 63 (02/01/20 1144)  Resp: 18 (02/01/20 1144)  BP: 131/60 (02/01/20 1144)  SpO2: 98 % (02/01/20 1144)  O2 Device (Oxygen Therapy): nasal cannula w/ humidification (02/01/20 0903) Vital Signs (24h Range):  Temp:  [97.4 °F (36.3 °C)-98.4 °F (36.9 °C)] 97.4 °F (36.3 °C)  Pulse:  [54-64] 63  Resp:  [16-18] 18  SpO2:  [97 %-99 %] 98 %  BP: (123-149)/(56-69) 131/60     Weight: 92.3 kg (203 lb 8.5 oz) (02/01/20 0757)  Body mass index is 34.94 kg/m².  Body surface area is 2.04 meters squared.    I/O last 3 completed shifts:  In: 1050 [P.O.:1050]  Out: 650 [Urine:650]    Physical Exam   Constitutional: She is oriented to person, place, and time. She appears well-developed and well-nourished. No distress.   HENT:   Head: Normocephalic and  atraumatic.   Eyes: Pupils are equal, round, and reactive to light. EOM are normal.   Neck: Normal range of motion. No JVD present.   Cardiovascular: Normal rate, regular rhythm and normal pulses.   Pulmonary/Chest: Effort normal. No respiratory distress.   On 3L nasal cannula    Abdominal: Soft. She exhibits no distension.   Musculoskeletal: Normal range of motion.   Neurological: She is alert and oriented to person, place, and time.   Skin: Skin is warm, dry and intact. Capillary refill takes less than 2 seconds. She is not diaphoretic.   Midline sternal incision with some purulent drainage. Gauze dressing in place    Psychiatric: She has a normal mood and affect. Her speech is normal and behavior is normal. Judgment and thought content normal.   Vitals reviewed.      Significant Labs:  CBC:   Recent Labs   Lab 02/01/20  0301   WBC 8.79   RBC 2.72*   HGB 8.1*   HCT 27.9*   *   *   MCH 29.8   MCHC 29.0*     CMP:   Recent Labs   Lab 02/01/20  0300   GLU 97   CALCIUM 9.3   ALBUMIN 2.3*   PROT 8.2   *   K 4.0   CO2 16*   CL 99   BUN 25*   CREATININE 4.5*   ALKPHOS 204*   ALT 41   AST 30   BILITOT 0.7     All labs within the past 24 hours have been reviewed.     Significant Imaging:  Labs: Reviewed    Assessment/Plan:     * Acute coronary syndrome  - per primary team     ROSLYN (acute kidney injury)  At time of re-consult: Ms Connelly is a 53 y/o female who is s/p CABG. Nephrology re-consulted for ROSLYN, decreased UOP, and concern for volume overload. Patient is now anuric with sCr trending up (4.4 on 1/16/20). Patient with metabolic acidosis on ABG/serum CO2 of 19. Patient now with concern for volume overload and now intubated after progressively increased oxygen requirements. Concern for pulmonary edema on CXR.     Plan/recommendations:   -due to worsening acidosis, will dialyze today  -Recommend Lasix  mg IV on 01/31, 350 cc of urine documented  -Renal diet   -Strict I/O's   -Renally dose  medications   -Avoid nephrotoxic medications  -Daily renal function panels   -Maintain Hgb >7.0  -Will discuss with Dr. Nicolas       Contrast dye induced nephropathy  See ROSLYN    Type 2 diabetes mellitus with hyperglycemia, with long-term current use of insulin  PLAN:  management as per primary and endocrineology        Thank you for your consult. I will follow-up with patient. Please contact us if you have any additional questions.    Norman Haque MD  Nephrology  [facility]    ATTENDING PHYSICIAN ATTESTATION  I have personally verified the history and examined the patient. I thoroughly reviewed the demographic, clinical, laboratorial and imaging information available in medical records. I agree with the assessment and recommendations provided by the subspecialty resident who was under my supervision.

## 2020-02-01 NOTE — NURSING
RN attempted to obtain morning glucose level and vital signs. Pt refused and asked RN to return later. Will re attempt prior to medication administration.

## 2020-02-01 NOTE — NURSING
RN spoke to MD Rogers on call for MD Adarsh and inquired about needing VBG. MD instructed RN to disregard order for VBG.

## 2020-02-01 NOTE — ASSESSMENT & PLAN NOTE
- home lexapro  - IS  - wean NC  - PO metoprolol and amio 400 TID  - Plavix   - IV Ceftriaxone q12. Will discharge with PO keflex for 10 days   - Place surgical bra   - Gauze dressing to incision

## 2020-02-01 NOTE — NURSING TRANSFER
Nursing Transfer Note      2/1/2020     Transfer From: Dialysis    Transfer via wheelchair    Transfer with 3 L to O2, cardiac monitoring    Transported by transport tech    Medicines sent: Insulin    Chart send with patient: Yes    Notified: mother    Patient reassessed at: 2/1/2020, 16:30 (date, time)    Upon arrival to floor: cardiac monitor applied, patient oriented to room, call bell in reach and bed in lowest position

## 2020-02-01 NOTE — ASSESSMENT & PLAN NOTE
At time of re-consult: Ms Connelly is a 55 y/o female who is s/p CABG. Nephrology re-consulted for ROSLYN, decreased UOP, and concern for volume overload. Patient is now anuric with sCr trending up (4.4 on 1/16/20). Patient with metabolic acidosis on ABG/serum CO2 of 19. Patient now with concern for volume overload and now intubated after progressively increased oxygen requirements. Concern for pulmonary edema on CXR.     Plan/recommendations:   -due to worsening acidosis, will dialyze today  -Recommend Lasix  mg IV on 01/31, 350 cc of urine documented  -Renal diet   -Strict I/O's   -Renally dose medications   -Avoid nephrotoxic medications  -Daily renal function panels   -Maintain Hgb >7.0  -Will discuss with Dr. Nicolas

## 2020-02-01 NOTE — ASSESSMENT & PLAN NOTE
- CRRT discontinued 1/21  - Successful HD trial 1/23, 1/25, 1/28, 1/30  - Making small amount of urine   - cont hyponatremia and elevated Cr  - PermaCath 1/27 with vascular sx  - d/c MOMOJ trialysis  - Will stagger metolazone/lasix doses today and monitor for UOP response  - Patient stable for rehab

## 2020-02-01 NOTE — PROGRESS NOTES
Pt c/o SOB on exertion. RN increased NC to 4 L. Sats >95. Pt having shallow breaths.  Pt did receive 80 mg IVP during day-shift. No urine ouput per pt. MD notified. PRN breathing treatment ordered. Stated pt will possibly need HD tomorrow.  Resp therapist called to bedside. Will continue to monitor.

## 2020-02-01 NOTE — PLAN OF CARE
Pt maintained free from falls/ trauma/ injuries. Pt has skin tear on the right upper posterior thigh, cleaned and foam dressing applied. Pt also has a stage 2 pressure ulcer in her gluteal cleft, wound care ordered, waffle mattress placed. Blood glucose monitoring continue, coverage was not given. Pt worked with PT / OT jaspal. She sit up on the side of the bed and chair throughout the day. Plan of care reviewed. Pt verbalized understanding. All questions and concerns addressed. Will continue to monitor.

## 2020-02-01 NOTE — ASSESSMENT & PLAN NOTE
"BG goal 140 - 180     Will continue to hold basal insulin at this time. Basal insulin needs most likely masked by poor kidney function at this time.   Continue Novolog 4 units TIDWM. 50% reduction from previous hospital MDI regimen. Kidney function remains poor.  (HOLD if eating < 25% at meals)   Low Dose SQ Insulin Correction Scale.  BG Monitoring AC/HS     ** Please call Endocrine for any BG related issues **  ** Please notify Endocrine for any change and/or advance in diet**      Discharge Planning:     TBD. Please notify endocrinology prior to discharge. Patient will need adjustments to home insulin regimen.     Tentative:     Levemir dose TBD    Novolog dose TBD    Low Dose SQ Insulin Correction Scale.    BG Monitoring AC/HS     PRN Insurance preferred diabetes testing supplies    PRN Ultra-Fine Junie Pen Needles 4mm x 32 G (5/32" x 0.23mm).     Patient request outpatient follow-up with [facility] [place] endo clinic for continued DM management.        "

## 2020-02-01 NOTE — PROGRESS NOTES
HEMODIALYSIS NOTE  Patient evaluated while undergoing hemodialysis indicated for unresolved ROSLYN. Tolerating session with current UFR, no complications.

## 2020-02-01 NOTE — PROGRESS NOTES
HD Tx ended. 2.5L removed during 3Hr Tx. Tolerated well. Blood returned via LCW cath. Ns flushed, heplocked, capped, taped.

## 2020-02-01 NOTE — PROGRESS NOTES
[facility]  Cardiothoracic Surgery  Progress Note    Patient Name: Suyapa Connelly  MRN: [id number]  Admission Date: 1/2/2020  Hospital Length of Stay: 30 days  Code Status: Full Code   Attending Physician: Huang Altamirano MD   Referring Provider: Self, Aaareferral  Principal Problem:Acute coronary syndrome    Subjective:     Post-Op Info:  Procedure(s) (LRB):  Insertion, Catheter, Central Venous, Hemodialysis (N/A)   5 Days Post-Op     Interval History:   NAEON. Continues to improve with PT. Slightly more UOP yesterday.    Review of Systems   Constitution: Negative for malaise/fatigue.   Cardiovascular: Negative for chest pain, claudication, dyspnea on exertion, irregular heartbeat, leg swelling and palpitations.   Respiratory: Negative for cough and shortness of breath.    Hematologic/Lymphatic: Negative for bleeding problem.   Gastrointestinal: Negative for abdominal pain.   Genitourinary: Negative for dysuria.   Neurological: Negative for headaches and weakness.     Medications:  Continuous Infusions:   heparin (porcine)       Scheduled Meds:   amiodarone  400 mg Oral TID    aspirin  81 mg Oral Daily    atorvastatin  80 mg Oral QHS    cefTRIAXone (ROCEPHIN) IVPB  1 g Intravenous Q12H    clopidogreL  75 mg Oral Daily    epoetin nina-ebpx (RETACRIT) injection  4,000 Units Intravenous Every Tues, Thurs, Sat    escitalopram oxalate  10 mg Oral Daily    furosemide  80 mg Intravenous BID    heparin (porcine)  5,000 Units Subcutaneous Q8H    insulin aspart U-100  4 Units Subcutaneous TIDWM    metOLazone  2.5 mg Oral BID    metoprolol succinate  50 mg Oral Daily    pantoprazole  40 mg Oral BID    polyethylene glycol  17 g Oral Daily    scopolamine  1 patch Transdermal Q3 Days    sevelamer carbonate  800 mg Oral TID WM     PRN Meds:acetaminophen, albuterol-ipratropium, bisacodyL, Dextrose 10% Bolus, Dextrose 10% Bolus, glucagon (human recombinant), glucose, glucose, heparin  (porcine), heparin (porcine), HYDROmorphone, insulin aspart U-100, metoclopramide HCl, metoprolol, ondansetron, oxyCODONE, oxyCODONE     Objective:     Vital Signs (Most Recent):  Temp: 98 °F (36.7 °C) (02/01/20 1600)  Pulse: 63 (02/01/20 1600)  Resp: 18 (02/01/20 1600)  BP: 137/73 (02/01/20 1600)  SpO2: 98 % (02/01/20 1144) Vital Signs (24h Range):  Temp:  [97.4 °F (36.3 °C)-98.4 °F (36.9 °C)] 98 °F (36.7 °C)  Pulse:  [54-69] 63  Resp:  [16-22] 18  SpO2:  [97 %-99 %] 98 %  BP: (108-158)/(51-77) 137/73     Weight: 92.3 kg (203 lb 8.5 oz)  Body mass index is 34.94 kg/m².    SpO2: 98 %  O2 Device (Oxygen Therapy): nasal cannula    Intake/Output - Last 3 Shifts       01/30 0700 - 01/31 0659 01/31 0700 - 02/01 0659 02/01 0700 - 02/02 0659    P.O. 480 810 180    Other 650  700    Total Intake(mL/kg) 1130 (11.8) 810 (8.4) 880 (9.5)    Urine (mL/kg/hr) 300 (0.1) 350 (0.2)     Other 3150  3100    Stool 0 0     Total Output 3450 350 3100    Net -2320 +460 -2220           Urine Occurrence 1 x 1 x 0 x    Stool Occurrence 1 x 0 x           Lines/Drains/Airways     Central Venous Catheter Line                 Hemodialysis Catheter 01/27/20 1624 left subclavian 5 days          Peripheral Intravenous Line                 Midline Catheter Insertion/Assessment  - Single Lumen 01/21/20 1016 Right basilic vein (medial side of arm) 18g x 10cm 11 days                Physical Exam   Constitutional: She is oriented to person, place, and time. She appears well-developed and well-nourished. No distress.   HENT:   Head: Normocephalic and atraumatic.   Eyes: Pupils are equal, round, and reactive to light. EOM are normal.   Neck: Normal range of motion. No JVD present.   Cardiovascular: Normal rate, regular rhythm and normal pulses.   Pulmonary/Chest: Effort normal. No respiratory distress.   On 3L nasal cannula    Abdominal: Soft. She exhibits no distension.   Musculoskeletal: Normal range of motion.   Neurological: She is alert and oriented  to person, place, and time.   Skin: Skin is warm, dry and intact. Capillary refill takes less than 2 seconds. She is not diaphoretic.   Midline sternal incision with some purulent drainage. Gauze dressing in place    Psychiatric: She has a normal mood and affect. Her speech is normal and behavior is normal. Judgment and thought content normal.   Vitals reviewed.      Significant Labs:  BMP:   Recent Labs   Lab 02/01/20  0300   GLU 97   *   K 4.0   CL 99   CO2 16*   BUN 25*   CREATININE 4.5*   CALCIUM 9.3   MG 2.1     CBC:   Recent Labs   Lab 02/01/20  0301   WBC 8.79   RBC 2.72*   HGB 8.1*   HCT 27.9*   *   *   MCH 29.8   MCHC 29.0*       Significant Diagnostics:  I have reviewed all pertinent imaging results/findings within the past 24 hours.    Assessment/Plan:     Paroxysmal atrial fibrillation  Patient went into afib with RVR during HD 1/28 and HR sustained at 117 despite 3 doses of IV metop     Amiodarone increased to 400 TID   PO metop         Acute blood loss anemia  Expected post operatively   CBC daily     S/P CABG x 1  - home lexapro  - IS  - wean NC  - PO metoprolol and amio 400 TID  - Plavix   - IV Ceftriaxone q12. Will discharge with PO keflex for 10 days   - Place surgical bra   - Gauze dressing to incision     Weakness  PT/OT    CAD (coronary artery disease)        Type 2 diabetes mellitus with hyperglycemia, with long-term current use of insulin  Endocrine following     Mixed hyperlipidemia  statin    ROSLYN (acute kidney injury)  - CRRT discontinued 1/21  - Successful HD trial 1/23, 1/25, 1/28, 1/30  - Making small amount of urine   - cont hyponatremia and elevated Cr  - PermaCath 1/27 with vascular sx  - d/c LIJ trialysis  - Will stagger metolazone/lasix doses today and monitor for UOP response  - Patient stable for rehab       Rosio Flood MD  Cardiothoracic Surgery  [facility] [address]

## 2020-02-01 NOTE — SUBJECTIVE & OBJECTIVE
"Interval HPI:   Overnight events: NAEON. BG reasonably controlled on current SQ insulin regimen. Creatinine 4.5.  Eatin%  Nausea: No  Hypoglycemia and intervention: No  Fever: No  TPN and/or TF: No  If yes, type of TF/TPN and rate: none    /60 (BP Location: Right arm, Patient Position: Lying)   Pulse 63   Temp 97.4 °F (36.3 °C) (Oral)   Resp 18   Ht 5' 4" (1.626 m)   Wt 92.3 kg (203 lb 8.5 oz)   LMP 2015 (Exact Date)   SpO2 98%   Breastfeeding? No   BMI 34.94 kg/m²     Labs Reviewed and Include    Recent Labs   Lab 20  0300   GLU 97   CALCIUM 9.3   ALBUMIN 2.3*   PROT 8.2   *   K 4.0   CO2 16*   CL 99   BUN 25*   CREATININE 4.5*   ALKPHOS 204*   ALT 41   AST 30   BILITOT 0.7     Lab Results   Component Value Date    WBC 8.79 2020    HGB 8.1 (L) 2020    HCT 27.9 (L) 2020     (H) 2020     (H) 2020     No results for input(s): TSH, FREET4 in the last 168 hours.  Lab Results   Component Value Date    HGBA1C 9.7 (H) 2020       Nutritional status:   Body mass index is 34.94 kg/m².  Lab Results   Component Value Date    ALBUMIN 2.3 (L) 2020    ALBUMIN 2.4 (L) 2020    ALBUMIN 2.6 (L) 2020     No results found for: PREALBUMIN    Estimated Creatinine Clearance: 15.9 mL/min (A) (based on SCr of 4.5 mg/dL (H)).    Accu-Checks  Recent Labs     20  0747 20  1246 20  1651 20  2213 20  0940 20  1311 20  1741 20  2052 20  0906 20  1154   POCTGLUCOSE 204* 123* 164* 140* 156* 141* 132* 113* 256* 86       Current Medications and/or Treatments Impacting Glycemic Control  Immunotherapy:    Immunosuppressants     None        Steroids:   Hormones (From admission, onward)    None        Pressors:    Autonomic Drugs (From admission, onward)    Start     Stop Route Frequency Ordered    20 1430  succinylcholine (ANECTINE) 20 mg/mL injection     Note to Pharmacy:  Created " by cabinet override    01/17 0244   01/16/20 1430    01/14/20 1853  rocuronium 10 mg/mL injection     Note to Pharmacy:  Created by cabinet override    01/15 0659   01/14/20 1853        Hyperglycemia/Diabetes Medications:   Antihyperglycemics (From admission, onward)    Start     Stop Route Frequency Ordered    01/29/20 1230  insulin aspart U-100 pen 4 Units      -- SubQ 3 times daily with meals 01/29/20 1118    01/22/20 1531  insulin aspart U-100 pen 0-5 Units      -- SubQ Before meals & nightly PRN 01/22/20 1431

## 2020-02-01 NOTE — PLAN OF CARE
Pt remained free of injuries, falls, and trauma. VSS. No complaints of pain mentioned tonight. Glucose monitored, 113. No SSI needed. Scheduled antibiotic administered. 80 mg of Lasix IVP given on day-shift to assess renal recovery. Pt did not have any UOP tonight. Pt also had an episode of SOB on exertion. PRN breathing treatment administered. Pt feeling better.  Sternal precautions, IS, and ambulation encouraged. Fall precautions maintained. Tele-sitter at bedside. Pt awaiting SNF placement. Plan of care reviewed with pt. Pt verbalized understanding. All questions and concerns addressed. No complaints mentioned at this time. Will continue to monitor.

## 2020-02-01 NOTE — SUBJECTIVE & OBJECTIVE
Interval History:   NAEON. Continues to improve with PT. Slightly more UOP yesterday.    Review of Systems   Constitution: Negative for malaise/fatigue.   Cardiovascular: Negative for chest pain, claudication, dyspnea on exertion, irregular heartbeat, leg swelling and palpitations.   Respiratory: Negative for cough and shortness of breath.    Hematologic/Lymphatic: Negative for bleeding problem.   Gastrointestinal: Negative for abdominal pain.   Genitourinary: Negative for dysuria.   Neurological: Negative for headaches and weakness.     Medications:  Continuous Infusions:   heparin (porcine)       Scheduled Meds:   amiodarone  400 mg Oral TID    aspirin  81 mg Oral Daily    atorvastatin  80 mg Oral QHS    cefTRIAXone (ROCEPHIN) IVPB  1 g Intravenous Q12H    clopidogreL  75 mg Oral Daily    epoetin nina-ebpx (RETACRIT) injection  4,000 Units Intravenous Every Tues, Thurs, Sat    escitalopram oxalate  10 mg Oral Daily    furosemide  80 mg Intravenous BID    heparin (porcine)  5,000 Units Subcutaneous Q8H    insulin aspart U-100  4 Units Subcutaneous TIDWM    metOLazone  2.5 mg Oral BID    metoprolol succinate  50 mg Oral Daily    pantoprazole  40 mg Oral BID    polyethylene glycol  17 g Oral Daily    scopolamine  1 patch Transdermal Q3 Days    sevelamer carbonate  800 mg Oral TID WM     PRN Meds:acetaminophen, albuterol-ipratropium, bisacodyL, Dextrose 10% Bolus, Dextrose 10% Bolus, glucagon (human recombinant), glucose, glucose, heparin (porcine), heparin (porcine), HYDROmorphone, insulin aspart U-100, metoclopramide HCl, metoprolol, ondansetron, oxyCODONE, oxyCODONE     Objective:     Vital Signs (Most Recent):  Temp: 98 °F (36.7 °C) (02/01/20 1600)  Pulse: 63 (02/01/20 1600)  Resp: 18 (02/01/20 1600)  BP: 137/73 (02/01/20 1600)  SpO2: 98 % (02/01/20 1144) Vital Signs (24h Range):  Temp:  [97.4 °F (36.3 °C)-98.4 °F (36.9 °C)] 98 °F (36.7 °C)  Pulse:  [54-69] 63  Resp:  [16-22] 18  SpO2:  [97 %-99  %] 98 %  BP: (108-158)/(51-77) 137/73     Weight: 92.3 kg (203 lb 8.5 oz)  Body mass index is 34.94 kg/m².    SpO2: 98 %  O2 Device (Oxygen Therapy): nasal cannula    Intake/Output - Last 3 Shifts       01/30 0700 - 01/31 0659 01/31 0700 - 02/01 0659 02/01 0700 - 02/02 0659    P.O. 480 810 180    Other 650  700    Total Intake(mL/kg) 1130 (11.8) 810 (8.4) 880 (9.5)    Urine (mL/kg/hr) 300 (0.1) 350 (0.2)     Other 3150  3100    Stool 0 0     Total Output 3450 350 3100    Net -2320 +460 -2220           Urine Occurrence 1 x 1 x 0 x    Stool Occurrence 1 x 0 x           Lines/Drains/Airways     Central Venous Catheter Line                 Hemodialysis Catheter 01/27/20 1624 left subclavian 5 days          Peripheral Intravenous Line                 Midline Catheter Insertion/Assessment  - Single Lumen 01/21/20 1016 Right basilic vein (medial side of arm) 18g x 10cm 11 days                Physical Exam   Constitutional: She is oriented to person, place, and time. She appears well-developed and well-nourished. No distress.   HENT:   Head: Normocephalic and atraumatic.   Eyes: Pupils are equal, round, and reactive to light. EOM are normal.   Neck: Normal range of motion. No JVD present.   Cardiovascular: Normal rate, regular rhythm and normal pulses.   Pulmonary/Chest: Effort normal. No respiratory distress.   On 3L nasal cannula    Abdominal: Soft. She exhibits no distension.   Musculoskeletal: Normal range of motion.   Neurological: She is alert and oriented to person, place, and time.   Skin: Skin is warm, dry and intact. Capillary refill takes less than 2 seconds. She is not diaphoretic.   Midline sternal incision with some purulent drainage. Gauze dressing in place    Psychiatric: She has a normal mood and affect. Her speech is normal and behavior is normal. Judgment and thought content normal.   Vitals reviewed.      Significant Labs:  BMP:   Recent Labs   Lab 02/01/20  0300   GLU 97   *   K 4.0   CL 99   CO2  16*   BUN 25*   CREATININE 4.5*   CALCIUM 9.3   MG 2.1     CBC:   Recent Labs   Lab 02/01/20  0301   WBC 8.79   RBC 2.72*   HGB 8.1*   HCT 27.9*   *   *   MCH 29.8   MCHC 29.0*       Significant Diagnostics:  I have reviewed all pertinent imaging results/findings within the past 24 hours.

## 2020-02-02 LAB
ALBUMIN SERPL BCP-MCNC: 2.1 G/DL (ref 3.5–5.2)
ALP SERPL-CCNC: 187 U/L (ref 55–135)
ALT SERPL W/O P-5'-P-CCNC: 36 U/L (ref 10–44)
ANION GAP SERPL CALC-SCNC: 11 MMOL/L (ref 8–16)
AST SERPL-CCNC: 30 U/L (ref 10–40)
BASOPHILS # BLD AUTO: 0.07 K/UL (ref 0–0.2)
BASOPHILS NFR BLD: 0.8 % (ref 0–1.9)
BILIRUB SERPL-MCNC: 0.6 MG/DL (ref 0.1–1)
BUN SERPL-MCNC: 19 MG/DL (ref 6–20)
CALCIUM SERPL-MCNC: 7.9 MG/DL (ref 8.7–10.5)
CHLORIDE SERPL-SCNC: 94 MMOL/L (ref 95–110)
CO2 SERPL-SCNC: 27 MMOL/L (ref 23–29)
CREAT SERPL-MCNC: 3.1 MG/DL (ref 0.5–1.4)
DIFFERENTIAL METHOD: ABNORMAL
EOSINOPHIL # BLD AUTO: 0.1 K/UL (ref 0–0.5)
EOSINOPHIL NFR BLD: 0.8 % (ref 0–8)
ERYTHROCYTE [DISTWIDTH] IN BLOOD BY AUTOMATED COUNT: 15.8 % (ref 11.5–14.5)
EST. GFR  (AFRICAN AMERICAN): 18.9 ML/MIN/1.73 M^2
EST. GFR  (NON AFRICAN AMERICAN): 16.4 ML/MIN/1.73 M^2
GLUCOSE SERPL-MCNC: 187 MG/DL (ref 70–110)
HCT VFR BLD AUTO: 27.7 % (ref 37–48.5)
HGB BLD-MCNC: 8.5 G/DL (ref 12–16)
IMM GRANULOCYTES # BLD AUTO: 0.03 K/UL (ref 0–0.04)
IMM GRANULOCYTES NFR BLD AUTO: 0.3 % (ref 0–0.5)
INR PPP: 1.5 (ref 0.8–1.2)
LYMPHOCYTES # BLD AUTO: 1 K/UL (ref 1–4.8)
LYMPHOCYTES NFR BLD: 10.8 % (ref 18–48)
MAGNESIUM SERPL-MCNC: 1.9 MG/DL (ref 1.6–2.6)
MCH RBC QN AUTO: 29.3 PG (ref 27–31)
MCHC RBC AUTO-ENTMCNC: 30.7 G/DL (ref 32–36)
MCV RBC AUTO: 96 FL (ref 82–98)
MONOCYTES # BLD AUTO: 1.4 K/UL (ref 0.3–1)
MONOCYTES NFR BLD: 15.4 % (ref 4–15)
NEUTROPHILS # BLD AUTO: 6.7 K/UL (ref 1.8–7.7)
NEUTROPHILS NFR BLD: 71.9 % (ref 38–73)
NRBC BLD-RTO: 0 /100 WBC
PHOSPHATE SERPL-MCNC: 2.4 MG/DL (ref 2.7–4.5)
PLATELET # BLD AUTO: 486 K/UL (ref 150–350)
PMV BLD AUTO: 10.8 FL (ref 9.2–12.9)
POCT GLUCOSE: 136 MG/DL (ref 70–110)
POCT GLUCOSE: 146 MG/DL (ref 70–110)
POCT GLUCOSE: 175 MG/DL (ref 70–110)
POCT GLUCOSE: 181 MG/DL (ref 70–110)
POCT GLUCOSE: 193 MG/DL (ref 70–110)
POTASSIUM SERPL-SCNC: 3.7 MMOL/L (ref 3.5–5.1)
PROT SERPL-MCNC: 7.9 G/DL (ref 6–8.4)
PROTHROMBIN TIME: 14.7 SEC (ref 9–12.5)
RBC # BLD AUTO: 2.9 M/UL (ref 4–5.4)
SODIUM SERPL-SCNC: 132 MMOL/L (ref 136–145)
WBC # BLD AUTO: 9.25 K/UL (ref 3.9–12.7)

## 2020-02-02 PROCEDURE — 99900035 HC TECH TIME PER 15 MIN (STAT)

## 2020-02-02 PROCEDURE — 85025 COMPLETE CBC W/AUTO DIFF WBC: CPT

## 2020-02-02 PROCEDURE — 85610 PROTHROMBIN TIME: CPT

## 2020-02-02 PROCEDURE — 27000221 HC OXYGEN, UP TO 24 HOURS

## 2020-02-02 PROCEDURE — 63600175 PHARM REV CODE 636 W HCPCS: Performed by: STUDENT IN AN ORGANIZED HEALTH CARE EDUCATION/TRAINING PROGRAM

## 2020-02-02 PROCEDURE — 94761 N-INVAS EAR/PLS OXIMETRY MLT: CPT

## 2020-02-02 PROCEDURE — 36415 COLL VENOUS BLD VENIPUNCTURE: CPT

## 2020-02-02 PROCEDURE — 94664 DEMO&/EVAL PT USE INHALER: CPT

## 2020-02-02 PROCEDURE — 20600001 HC STEP DOWN PRIVATE ROOM

## 2020-02-02 PROCEDURE — 84100 ASSAY OF PHOSPHORUS: CPT

## 2020-02-02 PROCEDURE — 25000003 PHARM REV CODE 250: Performed by: NURSE PRACTITIONER

## 2020-02-02 PROCEDURE — 94799 UNLISTED PULMONARY SVC/PX: CPT

## 2020-02-02 PROCEDURE — 83735 ASSAY OF MAGNESIUM: CPT

## 2020-02-02 PROCEDURE — 25000003 PHARM REV CODE 250: Performed by: STUDENT IN AN ORGANIZED HEALTH CARE EDUCATION/TRAINING PROGRAM

## 2020-02-02 PROCEDURE — 63600175 PHARM REV CODE 636 W HCPCS: Performed by: PHYSICIAN ASSISTANT

## 2020-02-02 PROCEDURE — 25000003 PHARM REV CODE 250: Performed by: PHYSICIAN ASSISTANT

## 2020-02-02 PROCEDURE — 80053 COMPREHEN METABOLIC PANEL: CPT

## 2020-02-02 RX ADMIN — PANTOPRAZOLE SODIUM 40 MG: 40 TABLET, DELAYED RELEASE ORAL at 09:02

## 2020-02-02 RX ADMIN — HEPARIN SODIUM 5000 UNITS: 5000 INJECTION, SOLUTION INTRAVENOUS; SUBCUTANEOUS at 09:02

## 2020-02-02 RX ADMIN — HEPARIN SODIUM 5000 UNITS: 5000 INJECTION, SOLUTION INTRAVENOUS; SUBCUTANEOUS at 06:02

## 2020-02-02 RX ADMIN — AMIODARONE HYDROCHLORIDE 400 MG: 200 TABLET ORAL at 10:02

## 2020-02-02 RX ADMIN — AMIODARONE HYDROCHLORIDE 400 MG: 200 TABLET ORAL at 02:02

## 2020-02-02 RX ADMIN — INSULIN ASPART 4 UNITS: 100 INJECTION, SOLUTION INTRAVENOUS; SUBCUTANEOUS at 12:02

## 2020-02-02 RX ADMIN — ATORVASTATIN CALCIUM 80 MG: 20 TABLET, FILM COATED ORAL at 09:02

## 2020-02-02 RX ADMIN — CLOPIDOGREL BISULFATE 75 MG: 75 TABLET, FILM COATED ORAL at 10:02

## 2020-02-02 RX ADMIN — SEVELAMER CARBONATE 800 MG: 800 TABLET, FILM COATED ORAL at 08:02

## 2020-02-02 RX ADMIN — AMIODARONE HYDROCHLORIDE 400 MG: 200 TABLET ORAL at 09:02

## 2020-02-02 RX ADMIN — INSULIN ASPART 4 UNITS: 100 INJECTION, SOLUTION INTRAVENOUS; SUBCUTANEOUS at 05:02

## 2020-02-02 RX ADMIN — CEFTRIAXONE SODIUM 1 G: 1 INJECTION, POWDER, FOR SOLUTION INTRAMUSCULAR; INTRAVENOUS at 09:02

## 2020-02-02 RX ADMIN — PANTOPRAZOLE SODIUM 40 MG: 40 TABLET, DELAYED RELEASE ORAL at 10:02

## 2020-02-02 RX ADMIN — ASPIRIN 81 MG CHEWABLE TABLET 81 MG: 81 TABLET CHEWABLE at 10:02

## 2020-02-02 RX ADMIN — INSULIN ASPART 4 UNITS: 100 INJECTION, SOLUTION INTRAVENOUS; SUBCUTANEOUS at 08:02

## 2020-02-02 RX ADMIN — SEVELAMER CARBONATE 800 MG: 800 TABLET, FILM COATED ORAL at 05:02

## 2020-02-02 RX ADMIN — CEFTRIAXONE SODIUM 1 G: 1 INJECTION, POWDER, FOR SOLUTION INTRAMUSCULAR; INTRAVENOUS at 10:02

## 2020-02-02 RX ADMIN — METOPROLOL SUCCINATE 50 MG: 50 TABLET, EXTENDED RELEASE ORAL at 10:02

## 2020-02-02 RX ADMIN — ESCITALOPRAM 10 MG: 5 TABLET, FILM COATED ORAL at 10:02

## 2020-02-02 RX ADMIN — SEVELAMER CARBONATE 800 MG: 800 TABLET, FILM COATED ORAL at 12:02

## 2020-02-02 RX ADMIN — HEPARIN SODIUM 5000 UNITS: 5000 INJECTION, SOLUTION INTRAVENOUS; SUBCUTANEOUS at 02:02

## 2020-02-02 NOTE — PLAN OF CARE
Pt remained free of injuries, falls, and trauma. VSS. No complaints of pain mentioned tonight. Glucose monitored, 181. No SSI needed. Metolazone added today to aid w/ diuresis. Pt went to HD. 2.5 L removed. Sternal precautions, IS, and ambulation encouraged. Fall precautions maintained. Tele-sitter at bedside. Pt awaiting SNF placement. Plan of care reviewed with pt. Pt verbalized understanding. All questions and concerns addressed. No complaints mentioned at this time. Will continue to monitor.

## 2020-02-02 NOTE — CARE UPDATE
"BG goal 140 - 180      Will continue to hold basal insulin at this time. Basal insulin needs most likely masked by poor kidney function at this time.   Continue Novolog 4 units TIDWM. 50% reduction from previous hospital MDI regimen. Kidney function remains poor.  (HOLD if eating < 25% at meals)   Low Dose SQ Insulin Correction Scale.  BG Monitoring AC/HS      ** Please call Endocrine for any BG related issues **  ** Please notify Endocrine for any change and/or advance in diet**        Discharge Planning:     TBD. Please notify endocrinology prior to discharge. Patient will need adjustments to home insulin regimen.      Tentative:     Levemir dose TBD    Novolog dose TBD    Low Dose SQ Insulin Correction Scale.    BG Monitoring AC/HS     PRN Insurance preferred diabetes testing supplies    PRN Ultra-Fine Junie Pen Needles 4mm x 32 G (5/32" x 0.23mm).     Patient request outpatient follow-up with [facility] [place]o clinic for continued DM management.   "

## 2020-02-03 LAB
ALBUMIN SERPL BCP-MCNC: 2 G/DL (ref 3.5–5.2)
ALP SERPL-CCNC: 167 U/L (ref 55–135)
ALT SERPL W/O P-5'-P-CCNC: 30 U/L (ref 10–44)
ANION GAP SERPL CALC-SCNC: 12 MMOL/L (ref 8–16)
AST SERPL-CCNC: 22 U/L (ref 10–40)
BASOPHILS # BLD AUTO: 0.07 K/UL (ref 0–0.2)
BASOPHILS NFR BLD: 0.8 % (ref 0–1.9)
BILIRUB SERPL-MCNC: 0.6 MG/DL (ref 0.1–1)
BUN SERPL-MCNC: 20 MG/DL (ref 6–20)
CALCIUM SERPL-MCNC: 8.7 MG/DL (ref 8.7–10.5)
CHLORIDE SERPL-SCNC: 96 MMOL/L (ref 95–110)
CO2 SERPL-SCNC: 26 MMOL/L (ref 23–29)
CREAT SERPL-MCNC: 3.4 MG/DL (ref 0.5–1.4)
DIFFERENTIAL METHOD: ABNORMAL
EOSINOPHIL # BLD AUTO: 0.1 K/UL (ref 0–0.5)
EOSINOPHIL NFR BLD: 1.2 % (ref 0–8)
ERYTHROCYTE [DISTWIDTH] IN BLOOD BY AUTOMATED COUNT: 16.1 % (ref 11.5–14.5)
EST. GFR  (AFRICAN AMERICAN): 16.9 ML/MIN/1.73 M^2
EST. GFR  (NON AFRICAN AMERICAN): 14.7 ML/MIN/1.73 M^2
GLUCOSE SERPL-MCNC: 138 MG/DL (ref 70–110)
HAV IGM SERPL QL IA: NEGATIVE
HBV CORE IGM SERPL QL IA: NEGATIVE
HBV SURFACE AG SERPL QL IA: NEGATIVE
HCT VFR BLD AUTO: 27.6 % (ref 37–48.5)
HCV AB SERPL QL IA: NEGATIVE
HGB BLD-MCNC: 8.3 G/DL (ref 12–16)
IMM GRANULOCYTES # BLD AUTO: 0.04 K/UL (ref 0–0.04)
IMM GRANULOCYTES NFR BLD AUTO: 0.5 % (ref 0–0.5)
INR PPP: 1.3 (ref 0.8–1.2)
LYMPHOCYTES # BLD AUTO: 1.4 K/UL (ref 1–4.8)
LYMPHOCYTES NFR BLD: 15.4 % (ref 18–48)
MAGNESIUM SERPL-MCNC: 1.9 MG/DL (ref 1.6–2.6)
MCH RBC QN AUTO: 29.2 PG (ref 27–31)
MCHC RBC AUTO-ENTMCNC: 30.1 G/DL (ref 32–36)
MCV RBC AUTO: 97 FL (ref 82–98)
MONOCYTES # BLD AUTO: 1.2 K/UL (ref 0.3–1)
MONOCYTES NFR BLD: 14 % (ref 4–15)
NEUTROPHILS # BLD AUTO: 6.1 K/UL (ref 1.8–7.7)
NEUTROPHILS NFR BLD: 68.1 % (ref 38–73)
NRBC BLD-RTO: 0 /100 WBC
PHOSPHATE SERPL-MCNC: 2.9 MG/DL (ref 2.7–4.5)
PLATELET # BLD AUTO: 476 K/UL (ref 150–350)
PMV BLD AUTO: 10.7 FL (ref 9.2–12.9)
POCT GLUCOSE: 135 MG/DL (ref 70–110)
POCT GLUCOSE: 142 MG/DL (ref 70–110)
POCT GLUCOSE: 171 MG/DL (ref 70–110)
POTASSIUM SERPL-SCNC: 3.6 MMOL/L (ref 3.5–5.1)
PROT SERPL-MCNC: 7.7 G/DL (ref 6–8.4)
PROTHROMBIN TIME: 12.6 SEC (ref 9–12.5)
RBC # BLD AUTO: 2.84 M/UL (ref 4–5.4)
SODIUM SERPL-SCNC: 134 MMOL/L (ref 136–145)
WBC # BLD AUTO: 8.88 K/UL (ref 3.9–12.7)

## 2020-02-03 PROCEDURE — 25000003 PHARM REV CODE 250: Performed by: STUDENT IN AN ORGANIZED HEALTH CARE EDUCATION/TRAINING PROGRAM

## 2020-02-03 PROCEDURE — 27000221 HC OXYGEN, UP TO 24 HOURS

## 2020-02-03 PROCEDURE — 25000003 PHARM REV CODE 250: Performed by: THORACIC SURGERY (CARDIOTHORACIC VASCULAR SURGERY)

## 2020-02-03 PROCEDURE — 92507 TX SP LANG VOICE COMM INDIV: CPT

## 2020-02-03 PROCEDURE — 85025 COMPLETE CBC W/AUTO DIFF WBC: CPT

## 2020-02-03 PROCEDURE — 84100 ASSAY OF PHOSPHORUS: CPT

## 2020-02-03 PROCEDURE — 85610 PROTHROMBIN TIME: CPT

## 2020-02-03 PROCEDURE — 80053 COMPREHEN METABOLIC PANEL: CPT

## 2020-02-03 PROCEDURE — 99232 SBSQ HOSP IP/OBS MODERATE 35: CPT | Mod: ,,, | Performed by: NURSE PRACTITIONER

## 2020-02-03 PROCEDURE — 63600175 PHARM REV CODE 636 W HCPCS: Performed by: STUDENT IN AN ORGANIZED HEALTH CARE EDUCATION/TRAINING PROGRAM

## 2020-02-03 PROCEDURE — 25000003 PHARM REV CODE 250: Performed by: NURSE PRACTITIONER

## 2020-02-03 PROCEDURE — 63600175 PHARM REV CODE 636 W HCPCS: Performed by: PHYSICIAN ASSISTANT

## 2020-02-03 PROCEDURE — 99232 PR SUBSEQUENT HOSPITAL CARE,LEVL II: ICD-10-PCS | Mod: ,,, | Performed by: NURSE PRACTITIONER

## 2020-02-03 PROCEDURE — 36415 COLL VENOUS BLD VENIPUNCTURE: CPT

## 2020-02-03 PROCEDURE — 20600001 HC STEP DOWN PRIVATE ROOM

## 2020-02-03 PROCEDURE — 80074 ACUTE HEPATITIS PANEL: CPT

## 2020-02-03 PROCEDURE — 94664 DEMO&/EVAL PT USE INHALER: CPT

## 2020-02-03 PROCEDURE — 83735 ASSAY OF MAGNESIUM: CPT

## 2020-02-03 PROCEDURE — 97116 GAIT TRAINING THERAPY: CPT

## 2020-02-03 PROCEDURE — 97530 THERAPEUTIC ACTIVITIES: CPT

## 2020-02-03 PROCEDURE — 97535 SELF CARE MNGMENT TRAINING: CPT

## 2020-02-03 RX ORDER — SODIUM CHLORIDE 9 MG/ML
INJECTION, SOLUTION INTRAVENOUS
Status: DISCONTINUED | OUTPATIENT
Start: 2020-02-03 | End: 2020-02-04

## 2020-02-03 RX ORDER — AMIODARONE HYDROCHLORIDE 200 MG/1
400 TABLET ORAL 2 TIMES DAILY
Status: DISCONTINUED | OUTPATIENT
Start: 2020-02-03 | End: 2020-02-04 | Stop reason: HOSPADM

## 2020-02-03 RX ORDER — HEPARIN SODIUM 1000 [USP'U]/ML
1000 INJECTION INTRAVENOUS; SUBCUTANEOUS
Status: DISCONTINUED | OUTPATIENT
Start: 2020-02-03 | End: 2020-02-04

## 2020-02-03 RX ORDER — SODIUM CHLORIDE 9 MG/ML
INJECTION, SOLUTION INTRAVENOUS ONCE
Status: DISCONTINUED | OUTPATIENT
Start: 2020-02-04 | End: 2020-02-04

## 2020-02-03 RX ADMIN — CEFTRIAXONE SODIUM 1 G: 1 INJECTION, POWDER, FOR SOLUTION INTRAMUSCULAR; INTRAVENOUS at 10:02

## 2020-02-03 RX ADMIN — HEPARIN SODIUM 5000 UNITS: 5000 INJECTION, SOLUTION INTRAVENOUS; SUBCUTANEOUS at 08:02

## 2020-02-03 RX ADMIN — AMIODARONE HYDROCHLORIDE 400 MG: 200 TABLET ORAL at 10:02

## 2020-02-03 RX ADMIN — SEVELAMER CARBONATE 800 MG: 800 TABLET, FILM COATED ORAL at 11:02

## 2020-02-03 RX ADMIN — PANTOPRAZOLE SODIUM 40 MG: 40 TABLET, DELAYED RELEASE ORAL at 10:02

## 2020-02-03 RX ADMIN — HEPARIN SODIUM 5000 UNITS: 5000 INJECTION, SOLUTION INTRAVENOUS; SUBCUTANEOUS at 05:02

## 2020-02-03 RX ADMIN — AMIODARONE HYDROCHLORIDE 400 MG: 200 TABLET ORAL at 08:02

## 2020-02-03 RX ADMIN — SEVELAMER CARBONATE 800 MG: 800 TABLET, FILM COATED ORAL at 10:02

## 2020-02-03 RX ADMIN — ASPIRIN 81 MG CHEWABLE TABLET 81 MG: 81 TABLET CHEWABLE at 10:02

## 2020-02-03 RX ADMIN — HEPARIN SODIUM 5000 UNITS: 5000 INJECTION, SOLUTION INTRAVENOUS; SUBCUTANEOUS at 02:02

## 2020-02-03 RX ADMIN — INSULIN ASPART 4 UNITS: 100 INJECTION, SOLUTION INTRAVENOUS; SUBCUTANEOUS at 08:02

## 2020-02-03 RX ADMIN — ESCITALOPRAM 10 MG: 5 TABLET, FILM COATED ORAL at 10:02

## 2020-02-03 RX ADMIN — INSULIN ASPART 4 UNITS: 100 INJECTION, SOLUTION INTRAVENOUS; SUBCUTANEOUS at 04:02

## 2020-02-03 RX ADMIN — ATORVASTATIN CALCIUM 80 MG: 20 TABLET, FILM COATED ORAL at 08:02

## 2020-02-03 RX ADMIN — SEVELAMER CARBONATE 800 MG: 800 TABLET, FILM COATED ORAL at 04:02

## 2020-02-03 RX ADMIN — CEFTRIAXONE SODIUM 1 G: 1 INJECTION, POWDER, FOR SOLUTION INTRAMUSCULAR; INTRAVENOUS at 08:02

## 2020-02-03 RX ADMIN — CLOPIDOGREL BISULFATE 75 MG: 75 TABLET, FILM COATED ORAL at 10:02

## 2020-02-03 RX ADMIN — PANTOPRAZOLE SODIUM 40 MG: 40 TABLET, DELAYED RELEASE ORAL at 08:02

## 2020-02-03 RX ADMIN — INSULIN ASPART 4 UNITS: 100 INJECTION, SOLUTION INTRAVENOUS; SUBCUTANEOUS at 11:02

## 2020-02-03 RX ADMIN — METOPROLOL SUCCINATE 50 MG: 50 TABLET, EXTENDED RELEASE ORAL at 10:02

## 2020-02-03 NOTE — PLAN OF CARE
Pt goals remain appropriate, continue w/ OT POC.    Problem: Occupational Therapy Goal  Goal: Occupational Therapy Goal  Description  Goals to be met by: 2/1/20     Patient will increase functional independence with ADLs by performing:    Feeding with [place].  UE Dressing with Minimal Assistance.  LE Dressing with Moderate Assistance.  Grooming while standing at sink with Contact Guard Assistance. - progressing towards   Toileting from toilet with Moderate Assistance for hygiene and clothing management. -updated 1/27/2020  Toilet transfer to toilet with Minimal Assistance. -updated 1/27/2020       Outcome: Ongoing, Progressing

## 2020-02-03 NOTE — PLAN OF CARE
Pt worked with PT/OT. Up in chair. Weaning off O2. Pt on 1L NC at this time. Glucose monitored and no coverage given with meals. Wound care done per order. Camera at the bedside. Plan of care discussed with patient. Fall precautions in place. Patient has no complaints of pain. Discussed medications and care. Patient has no questions at this time. Will continue to monitor.

## 2020-02-03 NOTE — PLAN OF CARE
02/03/20 1326   Discharge Reassessment   Assessment Type Discharge Planning Reassessment   Provided patient/caregiver education on the expected discharge date and the discharge plan Yes   Do you have any problems affording any of your prescribed medications? No   Discharge Plan A Home;Home with family   Discharge Plan B Home with family   DME Needed Upon Discharge  none   Anticipated Discharge Disposition Home     CM to bedside to discuss d/c planning. Explained that patient's insurance had denied rehab and that provider completed peer to peer and denial was upheld. Explained that patient had had multiple denials to skilled nursing and would go home with outpatient PT/OT. Discussed new dialysis chair for patient. Patient says she does not have preference of where she wants to go for dialysis.    Pt has no questions at this time. No family in room.    Julie Haase RN  Case Management [phone]

## 2020-02-03 NOTE — ASSESSMENT & PLAN NOTE
"BG goal 140 - 180     Will continue to hold basal insulin at this time. Basal insulin needs most likely masked by poor kidney function at this time.   Continue Novolog 4 units TIDWM. 50% reduction from previous hospital MDI regimen. Kidney function remains poor.  (HOLD if eating < 25% at meals)   Low Dose SQ Insulin Correction Scale.  BG Monitoring AC/HS     ** Please call Endocrine for any BG related issues **  ** Please notify Endocrine for any change and/or advance in diet**      Discharge Planning:     TBD. Please notify endocrinology prior to discharge. Patient will need adjustments to home insulin regimen.     Tentative:     Levemir dose TBD    Novolog dose TBD    Low Dose SQ Insulin Correction Scale.    BG Monitoring AC/HS     PRN Insurance preferred diabetes testing supplies    PRN Ultra-Fine Junie Pen Needles 4mm x 32 G (5/32" x 0.23mm).     Patient request outpatient follow-up with [facility] [place]o clinic for continued DM management.        "

## 2020-02-03 NOTE — PROGRESS NOTES
"[facility]  Endocrinology  Progress Note    Admit Date: 2020     Reason for Consult: Management of T2DM, Hyperglycemia     Surgical Procedure and Date: n/a    Diabetes diagnosis year:      Home Diabetes Medications:  Lantus 50 units daily and novolog 9 units with meals   Lab Results   Component Value Date    HGBA1C 9.7 (H) 2020         How often checking glucose at home? 1-3 x day   BG readings on regimen: 100-200 typically; sometimes in 300s   Hypoglycemia on the regimen?  Yes occasionally - usually overnight   Missed doses on regimen?  Yes    Diabetes Complications include:     Hyperglycemia, Hypoglycemia  and Diabetic peripheral neuropathy     Complicating diabetes co morbidities:   History of MI and CHF      HPI:   Patient is a 54 y.o. female with a diagnosis of uncontrolled type 2 DM. Also with CHF, PAD, HTN, HLD, and CAD. Patient presented with chest pain and diaphoresis. Also with nauseated, diaphoretic, a mild headache, epigastric pain, with a vague "heart burn" like feeling in her chest. Patient admitted for treatment and further workup. Endocrinology consulted for BG/ DM management.             Interval HPI:   Overnight events: NAEON. BG reasonably controlled on current SQ insulin regimen. Creatinine 3.4.  Eatin%  Nausea: No  Hypoglycemia and intervention: No  Fever: No  TPN and/or TF: No  If yes, type of TF/TPN and rate: none    /60 (BP Location: Left arm, Patient Position: Sitting)   Pulse 63   Temp 98 °F (36.7 °C) (Oral)   Resp 17   Ht 5' 4" (1.626 m)   Wt 91.9 kg (202 lb 9.6 oz)   LMP 2015 (Exact Date)   SpO2 96%   Breastfeeding? No   BMI 34.78 kg/m²      Labs Reviewed and Include    Recent Labs   Lab 20  0755   *   CALCIUM 8.7   ALBUMIN 2.0*   PROT 7.7   *   K 3.6   CO2 26   CL 96   BUN 20   CREATININE 3.4*   ALKPHOS 167*   ALT 30   AST 22   BILITOT 0.6     Lab Results   Component Value Date    WBC 8.88 2020    HGB " 8.3 (L) 02/03/2020    HCT 27.6 (L) 02/03/2020    MCV 97 02/03/2020     (H) 02/03/2020     No results for input(s): TSH, FREET4 in the last 168 hours.  Lab Results   Component Value Date    HGBA1C 9.7 (H) 01/03/2020       Nutritional status:   Body mass index is 34.78 kg/m².  Lab Results   Component Value Date    ALBUMIN 2.0 (L) 02/03/2020    ALBUMIN 2.1 (L) 02/02/2020    ALBUMIN 2.3 (L) 02/01/2020     No results found for: PREALBUMIN    Estimated Creatinine Clearance: 21 mL/min (A) (based on SCr of 3.4 mg/dL (H)).    Accu-Checks  Recent Labs     02/01/20  0906 02/01/20  1154 02/01/20  1638 02/01/20  2049 02/02/20  0018 02/02/20  0749 02/02/20  1211 02/02/20  1704 02/02/20  2127 02/03/20  0756   POCTGLUCOSE 256* 86 106 242* 181* 193* 175* 146* 136* 142*       Current Medications and/or Treatments Impacting Glycemic Control  Immunotherapy:    Immunosuppressants     None        Steroids:   Hormones (From admission, onward)    None        Pressors:    Autonomic Drugs (From admission, onward)    Start     Stop Route Frequency Ordered    01/16/20 1430  succinylcholine (ANECTINE) 20 mg/mL injection     Note to Pharmacy:  Created by cabinet override    01/17 0244   01/16/20 1430    01/14/20 1853  rocuronium 10 mg/mL injection     Note to Pharmacy:  Created by [employer] override    01/15 0659   01/14/20 1853        Hyperglycemia/Diabetes Medications:   Antihyperglycemics (From admission, onward)    Start     Stop Route Frequency Ordered    01/29/20 1230  insulin aspart U-100 pen 4 Units      -- SubQ 3 times daily with meals 01/29/20 1118    01/22/20 1531  insulin aspart U-100 pen 0-5 Units      -- SubQ Before meals & nightly PRN 01/22/20 1431          ASSESSMENT and PLAN    * Acute coronary syndrome  Managed per primary team  Avoid hypoglycemia        Type 2 diabetes mellitus with hyperglycemia, with long-term current use of insulin  BG goal 140 - 180     Will continue to hold basal insulin at this time. Basal insulin  "needs most likely masked by poor kidney function at this time.   Continue Novolog 4 units TIDWM. 50% reduction from previous hospital MDI regimen. Kidney function remains poor.  (HOLD if eating < 25% at meals)   Low Dose SQ Insulin Correction Scale.  BG Monitoring AC/HS     ** Please call Endocrine for any BG related issues **  ** Please notify Endocrine for any change and/or advance in diet**      Discharge Planning:     TBD. Please notify endocrinology prior to discharge. Patient will need adjustments to home insulin regimen.     Tentative:     Levemir dose TBD    Novolog dose TBD    Low Dose SQ Insulin Correction Scale.    BG Monitoring AC/HS     PRN Insurance preferred diabetes testing supplies    PRN Ultra-Fine Junie Pen Needles 4mm x 32 G (5/32" x 0.23mm).     Patient request outpatient follow-up with [facility] for continued DM management.          ROSLYN (acute kidney injury)  Titrate insulin slowly to avoid hypoglycemia as the risk of hypoglycemia increases with decreased creatinine clearance.            Mixed hyperlipidemia  Managed per primary team  Condition may cause insulin resistance         Essential hypertension  Managed per primary team  Condition may cause insulin resistance         Acute on chronic combined systolic (congestive) and diastolic (congestive) heart failure  Optimize glucose control and  avoid hypoglycemia          CAD (coronary artery disease)  Optimize glucose control and  avoid hypoglycemia            Mary Baptiste NP  Endocrinology  [facility]  "

## 2020-02-03 NOTE — PLAN OF CARE
Discharge Recommendation: Rehab.    0 goals met today. PT goals appropriate.    Courtney Meier PT, DPT  2/3/2020  Pager: [phone]        Problem: Physical Therapy Goal  Goal: Physical Therapy Goal  Description  Goals to be met by: 20    Patient will increase functional independence with mobility by performin. Supine to sit with Contact Guard Assistance -not met  2. Sit to stand transfer with Contact Guard Assistance -not met  3. Gait  x 150 feet with Contact Guard Assistance -not met  4. Ascend/descend 5 stair with left Handrails Contact Guard Assistance -not met       Outcome: Ongoing, Progressing

## 2020-02-03 NOTE — PT/OT/SLP PROGRESS
"Speech Language Pathology Treatment    Patient Name:  Suyapa Connelly   MRN:  [id number]  Admitting Diagnosis: Acute coronary syndrome    Recommendations:                 General Recommendations:  Cognitive-linguistic therapy; monitor diet tolerance  Diet recommendations:  Regular, Liquid Diet Level: Thin   Aspiration Precautions: 1 bite/sip at a time, Alternating bites/sips, Avoid talking while eating, HOB to 90 degrees, Meds whole 1 at a time, Monitor for s/s of aspiration, Small bites/sips and Standard aspiration precautions   General Precautions: Standard, fall  Communication strategies:  go to room if call light pushed    Subjective     "I feel about the same."    Pain/Comfort:  · Pain Rating 1: 0/10    Objective:     Has the patient been evaluated by SLP for swallowing?   Yes  Keep patient NPO? No   Current Respiratory Status: nasal cannula      Pt seen for cognitive tx and ongoing assessment of reading, writing, and visual spatial skills while sitting up in bedside chair this afternoon.  Pt was able to completed a delayed recall task recalling 3/3 facts after a 3 minute delay.  Pt demonstrated functional reading, writing, and visual spatial abilities.  Pt wearing Rx eyeglasses to complete all of these tasks. Session was ended to allow pt to work with PT/OT.  Pt appears to be improving. Will decrease POC to 3x/wk.     Assessment:     Suyapa Connelly is a 54 y.o. female with an SLP diagnosis of Cognitive-Linguistic Impairment.      Goals:   Multidisciplinary Problems     SLP Goals        Problem: SLP Goal    Goal Priority Disciplines Outcome   SLP Goal     SLP Ongoing, Progressing   Description:  Speech Language Pathology Goals  Revised goals expected to be met by 2/7:  1. Pt will tolerate a regular consistency diet and thin liquids without s/s of aspiration.  2. Pt will recall 3/3 words or facts after a 3 minute delay given min cues.   3. Pt will complete moderate level problem solving tasks with 80% accuracy. "   4. Pt will list an average of 11 items in a category in one minute.   5. Pt will participate in assessment of reading, writing, visual spatial, and functional math abilities.     Revised goals expected to be met 2/5:  1. Pt will tolerate a regular consistency diet and thin liquids without s/s of aspiration.  2. Pt will participate in speech/language/cognitive evaluation to determine need for cognitive intervention. Goal met 1/31.    Goals expected to be met by 1/29:  1. Pt will tolerate least restrictive diet without s/s of aspiration. Goal met                          Plan:     · Patient to be seen:  3 x/week   · Plan of Care expires:  02/21/20  · Plan of Care reviewed with:  patient   · SLP Follow-Up:  Yes       Discharge recommendations:  (continue SLP services upon d/c)     Time Tracking:     SLP Treatment Date:   02/03/20  Speech Start Time:  1345  Speech Stop Time:  1354     Speech Total Time (min):  9 min    Billable Minutes: Speech Therapy Individual 9    MARISOL Castellon, ABKAR-SLP  02/03/2020     MARISOL Castellon, CCC-SLP  Speech Language Pathologist  [phone]  2/3/2020

## 2020-02-03 NOTE — PLAN OF CARE
Glucose monitored and no coverage given with meals. Wound care done per order. Camera at the bedside. Plan of care discussed with patient. Fall precautions in place. Patient has no complaints of pain. Discussed medications and care. Patient has no questions at this time. Will continue to monitor.

## 2020-02-03 NOTE — SUBJECTIVE & OBJECTIVE
Interval History: NAEON. Waiting for SNF placement     Review of Systems   Constitution: Negative for malaise/fatigue.   Cardiovascular: Negative for chest pain, claudication, dyspnea on exertion, irregular heartbeat, leg swelling and palpitations.   Respiratory: Negative for cough and shortness of breath.    Hematologic/Lymphatic: Negative for bleeding problem.   Gastrointestinal: Negative for abdominal pain.   Genitourinary: Negative for dysuria.   Neurological: Negative for headaches and weakness.     Medications:  Continuous Infusions:   heparin (porcine)       Scheduled Meds:   amiodarone  400 mg Oral BID    aspirin  81 mg Oral Daily    atorvastatin  80 mg Oral QHS    cefTRIAXone (ROCEPHIN) IVPB  1 g Intravenous Q12H    clopidogreL  75 mg Oral Daily    epoetin nina-ebpx (RETACRIT) injection  4,000 Units Intravenous Every Tues, Thurs, Sat    escitalopram oxalate  10 mg Oral Daily    heparin (porcine)  5,000 Units Subcutaneous Q8H    insulin aspart U-100  4 Units Subcutaneous TIDWM    metoprolol succinate  50 mg Oral Daily    pantoprazole  40 mg Oral BID    polyethylene glycol  17 g Oral Daily    scopolamine  1 patch Transdermal Q3 Days    sevelamer carbonate  800 mg Oral TID WM     PRN Meds:acetaminophen, albuterol-ipratropium, bisacodyL, Dextrose 10% Bolus, Dextrose 10% Bolus, glucagon (human recombinant), glucose, glucose, heparin (porcine), heparin (porcine), HYDROmorphone, insulin aspart U-100, metoclopramide HCl, metoprolol, ondansetron, oxyCODONE, oxyCODONE     Objective:     Vital Signs (Most Recent):  Temp: 98.1 °F (36.7 °C) (02/03/20 0757)  Pulse: 64 (02/03/20 0757)  Resp: 16 (02/03/20 0757)  BP: 133/63 (02/03/20 0757)  SpO2: 100 % (02/03/20 0757) Vital Signs (24h Range):  Temp:  [97.6 °F (36.4 °C)-98.5 °F (36.9 °C)] 98.1 °F (36.7 °C)  Pulse:  [59-70] 64  Resp:  [16-18] 16  SpO2:  [91 %-100 %] 100 %  BP: (118-136)/(59-63) 133/63     Weight: 91.9 kg (202 lb 9.6 oz)  Body mass index is  34.78 kg/m².    SpO2: 100 %  O2 Device (Oxygen Therapy): nasal cannula    Intake/Output - Last 3 Shifts       02/01 0700 - 02/02 0659 02/02 0700 - 02/03 0659 02/03 0700 - 02/04 0659    P.O. 360 965     Other 700      Total Intake(mL/kg) 1060 (11.5) 965 (10.5)     Urine (mL/kg/hr) 0 (0) 200 (0.1)     Other 3100      Stool 0 0     Total Output 3100 200     Net -2040 +765            Urine Occurrence 0 x      Stool Occurrence 0 x 1 x           Lines/Drains/Airways     Central Venous Catheter Line                 Hemodialysis Catheter 01/27/20 1624 left subclavian 6 days          Peripheral Intravenous Line                 Midline Catheter Insertion/Assessment  - Single Lumen 01/21/20 1016 Right basilic vein (medial side of arm) 18g x 10cm 13 days                Physical Exam   Constitutional: She is oriented to person, place, and time. She appears well-developed and well-nourished.   Cardiovascular: Normal rate, regular rhythm and normal heart sounds.   Pulmonary/Chest: Effort normal and breath sounds normal.   Abdominal: Soft.   Musculoskeletal: Normal range of motion.   Neurological: She is alert and oriented to person, place, and time.   Skin: Skin is warm, dry and intact.   Sternal Incision CDI   Psychiatric: She has a normal mood and affect.       Significant Labs:  BMP:   Recent Labs   Lab 02/03/20  0755   *   *   K 3.6   CL 96   CO2 26   BUN 20   CREATININE 3.4*   CALCIUM 8.7   MG 1.9     CBC:   Recent Labs   Lab 02/03/20  0755   WBC 8.88   RBC 2.84*   HGB 8.3*   HCT 27.6*   *   MCV 97   MCH 29.2   MCHC 30.1*       Significant Diagnostics:  I have reviewed all pertinent imaging results/findings within the past 24 hours.

## 2020-02-03 NOTE — SUBJECTIVE & OBJECTIVE
Interval History:   Sitting in bedside chair this morning, c/o anxiety.  Oxygenation stable, some SOB on exertion.    Last had HD on Saturday, so far 600 ml of UOP this morning.    Review of patient's allergies indicates:   Allergen Reactions    Contrast media      Kidney injury    Pcn [penicillins]      Rash; tolerated ceftriaxone on 1/13/20     Current Facility-Administered Medications   Medication Frequency    acetaminophen tablet 650 mg Q6H PRN    albuterol-ipratropium 2.5 mg-0.5 mg/3 mL nebulizer solution 3 mL Q6H PRN    amiodarone tablet 400 mg BID    aspirin chewable tablet 81 mg Daily    atorvastatin tablet 80 mg QHS    bisacodyl suppository 10 mg Q12H PRN    cefTRIAXone injection 1 g Q12H    clopidogrel tablet 75 mg Daily    dextrose 10% (D10W) Bolus PRN    dextrose 10% (D10W) Bolus PRN    epoetin nina-epbx injection 4,000 Units Every Tues, Thurs, Sat    escitalopram oxalate tablet 10 mg Daily    glucagon (human recombinant) injection 1 mg PRN    glucose chewable tablet 16 g PRN    glucose chewable tablet 24 g PRN    heparin (porcine) injection 1,000 Units PRN    heparin (porcine) injection 5,000 Units Q8H    heparin infusion 1,000 units/500 ml in 0.9% NaCl (pressure line flush) Continuous PRN    HYDROmorphone injection 0.2 mg Q1H PRN    insulin aspart U-100 pen 0-5 Units QID (AC + HS) PRN    insulin aspart U-100 pen 4 Units TIDWM    metoclopramide HCl injection 10 mg Q6H PRN    metoprolol injection 5 mg Q5 Min PRN    metoprolol succinate (TOPROL-XL) 24 hr tablet 50 mg Daily    ondansetron injection 4 mg Q6H PRN    oxyCODONE immediate release tablet 10 mg Q4H PRN    oxyCODONE immediate release tablet 5 mg Q4H PRN    pantoprazole EC tablet 40 mg BID    polyethylene glycol packet 17 g Daily    scopolamine 1.3-1.5 mg (1 mg over 3 days) 1 patch Q3 Days    sevelamer carbonate tablet 800 mg TID WM       Objective:     Vital Signs (Most Recent):  Temp: 98 °F (36.7 °C) (02/03/20  1202)  Pulse: 63 (02/03/20 1202)  Resp: 17 (02/03/20 1202)  BP: 124/60 (02/03/20 1202)  SpO2: 96 % (02/03/20 1202)  O2 Device (Oxygen Therapy): nasal cannula (02/03/20 0757) Vital Signs (24h Range):  Temp:  [97.6 °F (36.4 °C)-98.5 °F (36.9 °C)] 98 °F (36.7 °C)  Pulse:  [59-65] 63  Resp:  [16-18] 17  SpO2:  [91 %-100 %] 96 %  BP: (124-136)/(59-63) 124/60     Weight: 91.9 kg (202 lb 9.6 oz) (02/03/20 0700)  Body mass index is 34.78 kg/m².  Body surface area is 2.04 meters squared.    I/O last 3 completed shifts:  In: 1145 [P.O.:1145]  Out: 200 [Urine:200]    Physical Exam   Constitutional: She is oriented to person, place, and time. She appears well-developed and well-nourished. No distress.   HENT:   Head: Normocephalic and atraumatic.   Eyes: Pupils are equal, round, and reactive to light. EOM are normal.   Neck: Normal range of motion. No JVD present.   Cardiovascular: Normal rate, regular rhythm and normal pulses.   Pulmonary/Chest: Effort normal. No respiratory distress. She has no rales.   On 3L nasal cannula    Abdominal: Soft. She exhibits no distension.   Musculoskeletal: Normal range of motion.   Neurological: She is alert and oriented to person, place, and time.   Skin: Skin is warm, dry and intact. Capillary refill takes less than 2 seconds. She is not diaphoretic.   Midline sternal incision with some purulent drainage. Gauze dressing in place    Psychiatric: She has a normal mood and affect. Her speech is normal and behavior is normal. Judgment and thought content normal.   Vitals reviewed.      Significant Labs:  CBC:   Recent Labs   Lab 02/03/20  0755   WBC 8.88   RBC 2.84*   HGB 8.3*   HCT 27.6*   *   MCV 97   MCH 29.2   MCHC 30.1*     CMP:   Recent Labs   Lab 02/03/20  0755   *   CALCIUM 8.7   ALBUMIN 2.0*   PROT 7.7   *   K 3.6   CO2 26   CL 96   BUN 20   CREATININE 3.4*   ALKPHOS 167*   ALT 30   AST 22   BILITOT 0.6

## 2020-02-03 NOTE — PT/OT/SLP PROGRESS
"Physical Therapy Treatment    Patient Name:  Suyapa Connelly   MRN:  [id number]  Admitting Diagnosis:  Acute coronary syndrome   Recent Surgery: Procedure(s) (LRB):  Insertion, Catheter, Central Venous, Hemodialysis (N/A) 7 Days Post-Op  Admit Date: 1/2/2020  Length of Stay: 32 days    Recommendations:     Discharge Recommendations:  rehabilitation facility   Discharge Equipment Recommendations: shower chair, 3-in-1 commode   Barriers to discharge: None    Assessment:     Suyapa Connelly is a 54 y.o. female admitted with a medical diagnosis of Acute coronary syndrome.  She presents with the following impairments/functional limitations:  weakness, impaired endurance, impaired self care skills, impaired functional mobilty, gait instability, impaired balance, decreased lower extremity function, decreased safety awareness, impaired cardiopulmonary response to activity, impaired skin, decreased upper extremity function, impaired coordination. Pt tolerated session fair today. PT made initial attempt in AM but patient refused stating fatigue as well as overall frustrations with staff. Pt voicing complaints that she gets "no say" in what she does and was upset that she had to sit UIC. Pt educated that there were orders put in by the MD to sit UIC during the day. Pt stated she was never told this before. Continued education required regarding matter. Pt req'd PT return in PM. Upon PT entrance in PM pt amenable to working with PT however still had c/o lack of privacy and feelings of not receiving adequate care in hospital. Pt stated she was fatigued and was unsure of how much she could do. Pt encouraged by PT to attempt ambulation in hallway with chair follow as able. Pt was able to progress ambulation to assist x 1 w/ HHA for approx 40 ft. Pt still with increased lateral sway as well as poor foot clearance making pt generally unsteady. Pt educated that she still required assist x 2 for mobility with [facility] staff at this time. Pt " "req'd to return to bed due to fatigue. Pt once again reminded of MD orders. PT spoke with RN, Margot, about pt c/o fatigue and RN agreeable to pt returning to bed at 1500. Pt informed and in agreement with plan. Pt is an excellent candidate for inpatient rehabilitation, as pt has experienced decline from PLOF, has good family support, and is motivated to participate in therapy.       Rehab Prognosis: Good; patient would benefit from acute skilled PT services to address these deficits and reach maximum level of function.    Recent Surgery: Procedure(s) (LRB):  Insertion, Catheter, Central Venous, Hemodialysis (N/A) 7 Days Post-Op    Plan:     During this hospitalization, patient to be seen 4 x/week to address the identified rehab impairments via gait training, therapeutic activities, therapeutic exercises, neuromuscular re-education and progress toward the following goals:    · Plan of Care Expires:  02/26/20    Subjective     RN notified prior to session. No family/friends present upon PT entrance into room.    Chief Complaint: "I feel like I'm not given a choice on what I have to do"  Patient/Family Comments/goals: get better  Pain/Comfort:  · Pain Rating Post-Intervention 1: 0/10      Objective:     Additional staff present: OT for co-treatment due to pt's limited activity tolerance, poor safety awareness, and limited cardiopulmonary reserve    Patient found up in chair with: telemetry, oxygen   Mental Status: Patient is oriented to AxOx4 and follows multistep commands. Patient is Alert and Cooperative during session.    General Precautions: Standard, Cardiac fall   Orthopedic Precautions:N/A   Braces: N/A   Body mass index is 34.78 kg/m².  Oxygen Device: Nasal Cannula 2L    Outcome Measures:  AM-PAC 6 CLICK MOBILITY  Turning over in bed (including adjusting bedclothes, sheets and blankets)?: 3  Sitting down on and standing up from a chair with arms (e.g., wheelchair, bedside commode, etc.): 3  Moving from lying " on back to sitting on the side of the bed?: 3  Moving to and from a bed to a chair (including a wheelchair)?: 3  Need to walk in hospital room?: 3  Climbing 3-5 steps with a railing?: 2  Basic Mobility Total Score: 17     Functional Mobility:    Bed Mobility:   · Pt found/returned to bedside chair    Transfers:   · Sit <> Stand Transfer: moderate assistance with hand-held assist   · Stand <> Sit Transfer: contact guard assistance with hand-held assist   · [id number] from bedside chair    Standing Balance:   Assistance Level Required: Contact Guard Assistance   Patient used: hand-held assist       Gait:  · Patient ambulated: 40 ft   · Patient required: contact guard  · Patient used:  hand-held assist   · Gait Pattern observed: full weightbearing  · Gait Deviation(s): unsteady gait, decreased step length, wide base of support, shuffle gait, flexed posture, decreased nirav and increased lateral sway  · Impairments due to: impaired balance, decreased strength and decreased endurance  · Portable Supplemental O2 2L utilized  · all lines remained intact throughout ambulation trial  · Chair follow for patient safety  · Comments: Pt with no overt LOB and no longer requiring assist to remain upright. Pt however generally unsteady due to poor foot clearance and increased lateral sway requiring CGA and close chair follow.    Education:   Time provided for education, counseling and discussion of health disposition in regards to patient's current status   All questions answered within PT scope of practice and to patient's satisfaction   PT role in POC to address current functional deficits   Pt educated on proper body mechanics, safety techniques, and energy conservation with PT facilitation and cueing throughout session   Call nursing/pct to transfer to chair/use bathroom. Pt stated understanding.   Whiteboard updated with pt's current mobility status documented above   Safe to perform step transfer to/from  chair/bedside commode CGA and HHAx2 w/ nursing/PCT present   Pt to ambulate 2x/day CGA and HHAx2 with nsg/PCT in order to maintain functional mobility   Importance of OOB tolerance 8-10 hrs/day to improve lung ventilation and expansion as well as strengthen postural musculature    Patient left up in chair with all lines intact, call button in reach and RN notified.    GOALS:   Multidisciplinary Problems     Physical Therapy Goals        Problem: Physical Therapy Goal    Goal Priority Disciplines Outcome Goal Variances Interventions   Physical Therapy Goal     PT, PT/OT Ongoing, Progressing     Description:  Goals to be met by: 20    Patient will increase functional independence with mobility by performin. Supine to sit with Contact Guard Assistance -not met  2. Sit to stand transfer with Contact Guard Assistance -not met  3. Gait  x 150 feet with Contact Guard Assistance -not met  4. Ascend/descend 5 stair with left Handrails Contact Guard Assistance -not met                  Multidisciplinary Problems (Resolved)        Problem: Physical Therapy Goal    Goal Priority Disciplines Outcome Goal Variances Interventions   Physical Therapy Goal   (Resolved)     PT, PT/OT Met     Description:  Eval only                  Time Tracking:     PT Received On: 20  PT Start Time: 1352     PT Stop Time: 1418  PT Total Time (min): 26 min       Billable Minutes:   · Gait Training 15 and Therapeutic Activity 11    Treatment Type: Treatment  PT/PTA: PT       Courtney Meier PT, DPT  2/3/2020  Pager: [phone]

## 2020-02-03 NOTE — ASSESSMENT & PLAN NOTE
At time of re-consult: Ms Connelly is a 55 y/o female who is s/p CABG. Nephrology re-consulted for ROSLYN, decreased UOP, and concern for volume overload. Patient is now anuric with sCr trending up (4.4 on 1/16/20). Patient with metabolic acidosis on ABG/serum CO2 of 19. Patient now with concern for volume overload and now intubated after progressively increased oxygen requirements. Concern for pulmonary edema on CXR.     Plan/recommendations:   -hold RRT today  -600 ml of UOP this morning.  Monitor.  -Renal diet   -Strict I/O's   -Renally dose medications   -Avoid nephrotoxic medications  -Daily renal function panels   -Maintain Hgb >7.0  -Will discuss with Dr. Lange.

## 2020-02-03 NOTE — PLAN OF CARE
02/03/20 1238   Post-Acute Status   Post-Acute Authorization Placement;Dialysis   Post-Acute Placement Status Discharge Plan Changed   Diaylsis Status Referrals Sent     SW informed by CRYSTAL Maldonado that decision made by VENANCIO Royal to send pt home with outpatient therapy and outpatient HD due to multiple SNF denials and zero acceptances.  SW also informed by CRYSTAL Maldonado that pt does not have a preference of outpatient HD facility.  SW sent HD referral to [facility] (ph [phone], fx [phone]) requesting [place] location.    UPDATE 4:07 PM  SW received call from Vivi with [facility] intake ([phone]) reporting that HD chair set-up is in progress.    Jenna Bonilla, IKER  [facility]  [id number]

## 2020-02-03 NOTE — PLAN OF CARE
02/03/20 1624   Post-Acute Status   Post-Acute Authorization Dialysis   Diaylsis Status Pending Medical Review     EDUARD received the following dialysis schedule for [facility]:  M/W/F 11:30am with first run Friday 2/7/20.  EDUARD called [facility] ([phone]) and spoke with DEANGELO Grayson inquiring about starting service on Wed 2/5/20 instead so that pt can discharge.  Renuka explained that pt has not yet been reviewed by their medical director and cannot start until approval from medical director.  EDUARD faxed referral packet for Renuka to give to the medical director for review.  Renuka to call EDUARD after medical director approval.    Jenna Bonilla, IKER  [facility]  [id number]

## 2020-02-03 NOTE — PLAN OF CARE
Pt remained free of injuries, falls, and trauma. VSS. Pt weaned down to 2 L NC. No complaints of pain mentioned tonight. Glucose monitored, 136. No SSI needed. Sternal precautions, IS, and ambulation encouraged. Fall precautions maintained. Tele-sitter at bedside. Plan of care reviewed with pt. Pt verbalized understanding. All questions and concerns addressed. No complaints mentioned at this time. Will continue to monitor.    used

## 2020-02-03 NOTE — PROGRESS NOTES
AVA Skin Integrity Evaluation      Subjective    AVA skin integrity champion evaluation of patient as part of the comprehensive skin care team .       Suyapa Connelly is being evaluated as per the Epic  pressure injury skin report.  She has been admitted to [facility] for CABG for 20 days .   Skin injury was noted on 1/31/19            Limited Physical exam     Lesion 1: Gluteal Cleft        Assessment :             Lesion 1:  Stage 1 Pressure Ulcer , healed, no open skin     POA : no    Consults: Wound Care, Nutrition and Physical Therapy       Follow up:    Patient will be re evaluated weekly.

## 2020-02-03 NOTE — PROGRESS NOTES
[facility]  Cardiothoracic Surgery  Progress Note    Patient Name: Suyapa Connelly  MRN: [id number]  Admission Date: 1/2/2020  Hospital Length of Stay: 32 days  Code Status: Full Code   Attending Physician: Huang Altamirano MD   Referring Provider: Self, Aaareferral  Principal Problem:Acute coronary syndrome      Subjective:     Post-Op Info:  Procedure(s) (LRB):  Insertion, Catheter, Central Venous, Hemodialysis (N/A)   7 Days Post-Op     Interval History: NAEON. Waiting for SNF placement     Review of Systems   Constitution: Negative for malaise/fatigue.   Cardiovascular: Negative for chest pain, claudication, dyspnea on exertion, irregular heartbeat, leg swelling and palpitations.   Respiratory: Negative for cough and shortness of breath.    Hematologic/Lymphatic: Negative for bleeding problem.   Gastrointestinal: Negative for abdominal pain.   Genitourinary: Negative for dysuria.   Neurological: Negative for headaches and weakness.     Medications:  Continuous Infusions:   heparin (porcine)       Scheduled Meds:   amiodarone  400 mg Oral BID    aspirin  81 mg Oral Daily    atorvastatin  80 mg Oral QHS    cefTRIAXone (ROCEPHIN) IVPB  1 g Intravenous Q12H    clopidogreL  75 mg Oral Daily    epoetin nina-ebpx (RETACRIT) injection  4,000 Units Intravenous Every Tues, Thurs, Sat    escitalopram oxalate  10 mg Oral Daily    heparin (porcine)  5,000 Units Subcutaneous Q8H    insulin aspart U-100  4 Units Subcutaneous TIDWM    metoprolol succinate  50 mg Oral Daily    pantoprazole  40 mg Oral BID    polyethylene glycol  17 g Oral Daily    scopolamine  1 patch Transdermal Q3 Days    sevelamer carbonate  800 mg Oral TID WM     PRN Meds:acetaminophen, albuterol-ipratropium, bisacodyL, Dextrose 10% Bolus, Dextrose 10% Bolus, glucagon (human recombinant), glucose, glucose, heparin (porcine), heparin (porcine), HYDROmorphone, insulin aspart U-100, metoclopramide HCl, metoprolol, ondansetron,  oxyCODONE, oxyCODONE     Objective:     Vital Signs (Most Recent):  Temp: 98.1 °F (36.7 °C) (02/03/20 0757)  Pulse: 64 (02/03/20 0757)  Resp: 16 (02/03/20 0757)  BP: 133/63 (02/03/20 0757)  SpO2: 100 % (02/03/20 0757) Vital Signs (24h Range):  Temp:  [97.6 °F (36.4 °C)-98.5 °F (36.9 °C)] 98.1 °F (36.7 °C)  Pulse:  [59-70] 64  Resp:  [16-18] 16  SpO2:  [91 %-100 %] 100 %  BP: (118-136)/(59-63) 133/63     Weight: 91.9 kg (202 lb 9.6 oz)  Body mass index is 34.78 kg/m².    SpO2: 100 %  O2 Device (Oxygen Therapy): nasal cannula    Intake/Output - Last 3 Shifts       02/01 0700 - 02/02 0659 02/02 0700 - 02/03 0659 02/03 0700 - 02/04 0659    P.O. 360 965     Other 700      Total Intake(mL/kg) 1060 (11.5) 965 (10.5)     Urine (mL/kg/hr) 0 (0) 200 (0.1)     Other 3100      Stool 0 0     Total Output 3100 200     Net -2040 +765            Urine Occurrence 0 x      Stool Occurrence 0 x 1 x           Lines/Drains/Airways     Central Venous Catheter Line                 Hemodialysis Catheter 01/27/20 1624 left subclavian 6 days          Peripheral Intravenous Line                 Midline Catheter Insertion/Assessment  - Single Lumen 01/21/20 1016 Right basilic vein (medial side of arm) 18g x 10cm 13 days                Physical Exam   Constitutional: She is oriented to person, place, and time. She appears well-developed and well-nourished.   Cardiovascular: Normal rate, regular rhythm and normal heart sounds.   Pulmonary/Chest: Effort normal and breath sounds normal.   Abdominal: Soft.   Musculoskeletal: Normal range of motion.   Neurological: She is alert and oriented to person, place, and time.   Skin: Skin is warm, dry and intact.   Sternal Incision CDI   Psychiatric: She has a normal mood and affect.       Significant Labs:  BMP:   Recent Labs   Lab 02/03/20  0755   *   *   K 3.6   CL 96   CO2 26   BUN 20   CREATININE 3.4*   CALCIUM 8.7   MG 1.9     CBC:   Recent Labs   Lab 02/03/20  0755   WBC 8.88   RBC 2.84*    HGB 8.3*   HCT 27.6*   *   MCV 97   MCH 29.2   MCHC 30.1*       Significant Diagnostics:  I have reviewed all pertinent imaging results/findings within the past 24 hours.    Assessment/Plan:     Paroxysmal atrial fibrillation  Patient went into afib with RVR during HD 1/28 and HR sustained at 117 despite 3 doses of IV metop     Amiodarone increased to 400 BID   PO metop       Acute blood loss anemia  Expected post operatively   CBC daily     S/P CABG x 1  - home lexapro  - IS  - wean NC  - PO metoprolol and amio 400 TID  - Plavix   - IV Ceftriaxone q12. Will discharge with PO keflex for 10 days   - Place surgical bra   - Gauze dressing to incision     Weakness  PT/OT      Type 2 diabetes mellitus with hyperglycemia, with long-term current use of insulin  Endocrine following     Mixed hyperlipidemia  statin    ROSLYN (acute kidney injury)  - CRRT discontinued 1/21  - Successful HD trial 1/23, 1/25, 1/28, 1/30  - Making small amount of urine   - cont hyponatremia and elevated Cr  - PermaCath 1/27 with vascular sx  - d/c LIJ trialysis  - Will stagger metolazone/lasix doses today and monitor for UOP response  - Patient stable for rehab/SNF         Genny Ortez NP  Cardiothoracic Surgery  [facility]

## 2020-02-03 NOTE — SUBJECTIVE & OBJECTIVE
"Interval HPI:   Overnight events: NAEON. BG reasonably controlled on current SQ insulin regimen. Creatinine 3.4.  Eatin%  Nausea: No  Hypoglycemia and intervention: No  Fever: No  TPN and/or TF: No  If yes, type of TF/TPN and rate: none    /60 (BP Location: Left arm, Patient Position: Sitting)   Pulse 63   Temp 98 °F (36.7 °C) (Oral)   Resp 17   Ht 5' 4" (1.626 m)   Wt 91.9 kg (202 lb 9.6 oz)   LMP 2015 (Exact Date)   SpO2 96%   Breastfeeding? No   BMI 34.78 kg/m²     Labs Reviewed and Include    Recent Labs   Lab 20  0755   *   CALCIUM 8.7   ALBUMIN 2.0*   PROT 7.7   *   K 3.6   CO2 26   CL 96   BUN 20   CREATININE 3.4*   ALKPHOS 167*   ALT 30   AST 22   BILITOT 0.6     Lab Results   Component Value Date    WBC 8.88 2020    HGB 8.3 (L) 2020    HCT 27.6 (L) 2020    MCV 97 2020     (H) 2020     No results for input(s): TSH, FREET4 in the last 168 hours.  Lab Results   Component Value Date    HGBA1C 9.7 (H) 2020       Nutritional status:   Body mass index is 34.78 kg/m².  Lab Results   Component Value Date    ALBUMIN 2.0 (L) 2020    ALBUMIN 2.1 (L) 2020    ALBUMIN 2.3 (L) 2020     No results found for: PREALBUMIN    Estimated Creatinine Clearance: 21 mL/min (A) (based on SCr of 3.4 mg/dL (H)).    Accu-Checks  Recent Labs     20  0906 20  1154 20  1638 20  2049 20  0018 20  0749 20  1211 20  1704 20  2127 20  0756   POCTGLUCOSE 256* 86 106 242* 181* 193* 175* 146* 136* 142*       Current Medications and/or Treatments Impacting Glycemic Control  Immunotherapy:    Immunosuppressants     None        Steroids:   Hormones (From admission, onward)    None        Pressors:    Autonomic Drugs (From admission, onward)    Start     Stop Route Frequency Ordered    20 1430  succinylcholine (ANECTINE) 20 mg/mL injection     Note to Pharmacy:  Created by cabinet " override    01/17 0244   01/16/20 1430    01/14/20 1853  rocuronium 10 mg/mL injection     Note to Pharmacy:  Created by cabinet override    01/15 0659   01/14/20 1853        Hyperglycemia/Diabetes Medications:   Antihyperglycemics (From admission, onward)    Start     Stop Route Frequency Ordered    01/29/20 1230  insulin aspart U-100 pen 4 Units      -- SubQ 3 times daily with meals 01/29/20 1118    01/22/20 1531  insulin aspart U-100 pen 0-5 Units      -- SubQ Before meals & nightly PRN 01/22/20 1431

## 2020-02-03 NOTE — PLAN OF CARE
Problem: SLP Goal  Goal: SLP Goal  Description  Speech Language Pathology Goals  Revised goals expected to be met by 2/7:  1. Pt will tolerate a regular consistency diet and thin liquids without s/s of aspiration.  2. Pt will recall 3/3 words or facts after a 3 minute delay given min cues.   3. Pt will complete moderate level problem solving tasks with 80% accuracy.   4. Pt will list an average of 11 items in a category in one minute.   5. Pt will participate in assessment of reading, writing, visual spatial, and functional math abilities.     Revised goals expected to be met 2/5:  1. Pt will tolerate a regular consistency diet and thin liquids without s/s of aspiration.  2. Pt will participate in speech/language/cognitive evaluation to determine need for cognitive intervention. Goal met 1/31.    Goals expected to be met by 1/29:  1. Pt will tolerate least restrictive diet without s/s of aspiration. Goal met         Outcome: Ongoing, Progressing  Progress towards meeting goals noted. Cont POC.   MARISOL Castellon, CCC-SLP  Speech Language Pathologist  [phone]  2/3/2020

## 2020-02-03 NOTE — PROGRESS NOTES
[facility]  Nephrology  Progress Note    Patient Name: Suyapa Connelly  MRN: [id number]  Admission Date: 1/2/2020  Hospital Length of Stay: 32 days  Attending Provider: Huang Altamirano MD   Primary Care Physician: Erlinda Rahman NP  Principal Problem:Acute coronary syndrome    Subjective:       Interval History:   Sitting in bedside chair this morning, c/o anxiety.  Oxygenation stable, some SOB on exertion.    Last had HD on Saturday, so far 600 ml of UOP this morning.    Review of patient's allergies indicates:   Allergen Reactions    Contrast media      Kidney injury    Pcn [penicillins]      Rash; tolerated ceftriaxone on 1/13/20     Current Facility-Administered Medications   Medication Frequency    acetaminophen tablet 650 mg Q6H PRN    albuterol-ipratropium 2.5 mg-0.5 mg/3 mL nebulizer solution 3 mL Q6H PRN    amiodarone tablet 400 mg BID    aspirin chewable tablet 81 mg Daily    atorvastatin tablet 80 mg QHS    bisacodyl suppository 10 mg Q12H PRN    cefTRIAXone injection 1 g Q12H    clopidogrel tablet 75 mg Daily    dextrose 10% (D10W) Bolus PRN    dextrose 10% (D10W) Bolus PRN    epoetin nina-epbx injection 4,000 Units Every Tues, Thurs, Sat    escitalopram oxalate tablet 10 mg Daily    glucagon (human recombinant) injection 1 mg PRN    glucose chewable tablet 16 g PRN    glucose chewable tablet 24 g PRN    heparin (porcine) injection 1,000 Units PRN    heparin (porcine) injection 5,000 Units Q8H    heparin infusion 1,000 units/500 ml in 0.9% NaCl (pressure line flush) Continuous PRN    HYDROmorphone injection 0.2 mg Q1H PRN    insulin aspart U-100 pen 0-5 Units QID (AC + HS) PRN    insulin aspart U-100 pen 4 Units TIDWM    metoclopramide HCl injection 10 mg Q6H PRN    metoprolol injection 5 mg Q5 Min PRN    metoprolol succinate (TOPROL-XL) 24 hr tablet 50 mg Daily    ondansetron injection 4 mg Q6H PRN    oxyCODONE immediate release tablet 10 mg Q4H PRN     oxyCODONE immediate release tablet 5 mg Q4H PRN    pantoprazole EC tablet 40 mg BID    polyethylene glycol packet 17 g Daily    scopolamine 1.3-1.5 mg (1 mg over 3 days) 1 patch Q3 Days    sevelamer carbonate tablet 800 mg TID WM       Objective:     Vital Signs (Most Recent):  Temp: 98 °F (36.7 °C) (02/03/20 1202)  Pulse: 63 (02/03/20 1202)  Resp: 17 (02/03/20 1202)  BP: 124/60 (02/03/20 1202)  SpO2: 96 % (02/03/20 1202)  O2 Device (Oxygen Therapy): nasal cannula (02/03/20 0757) Vital Signs (24h Range):  Temp:  [97.6 °F (36.4 °C)-98.5 °F (36.9 °C)] 98 °F (36.7 °C)  Pulse:  [59-65] 63  Resp:  [16-18] 17  SpO2:  [91 %-100 %] 96 %  BP: (124-136)/(59-63) 124/60     Weight: 91.9 kg (202 lb 9.6 oz) (02/03/20 0700)  Body mass index is 34.78 kg/m².  Body surface area is 2.04 meters squared.    I/O last 3 completed shifts:  In: 1145 [P.O.:1145]  Out: 200 [Urine:200]    Physical Exam   Constitutional: She is oriented to person, place, and time. She appears well-developed and well-nourished. No distress.   HENT:   Head: Normocephalic and atraumatic.   Eyes: Pupils are equal, round, and reactive to light. EOM are normal.   Neck: Normal range of motion. No JVD present.   Cardiovascular: Normal rate, regular rhythm and normal pulses.   Pulmonary/Chest: Effort normal. No respiratory distress. She has no rales.   On 3L nasal cannula    Abdominal: Soft. She exhibits no distension.   Musculoskeletal: Normal range of motion.   Neurological: She is alert and oriented to person, place, and time.   Skin: Skin is warm, dry and intact. Capillary refill takes less than 2 seconds. She is not diaphoretic.   Midline sternal incision with some purulent drainage. Gauze dressing in place    Psychiatric: She has a normal mood and affect. Her speech is normal and behavior is normal. Judgment and thought content normal.   Vitals reviewed.      Significant Labs:  CBC:   Recent Labs   Lab 02/03/20  0755   WBC 8.88   RBC 2.84*   HGB 8.3*   HCT  27.6*   *   MCV 97   MCH 29.2   MCHC 30.1*     CMP:   Recent Labs   Lab 02/03/20  0755   *   CALCIUM 8.7   ALBUMIN 2.0*   PROT 7.7   *   K 3.6   CO2 26   CL 96   BUN 20   CREATININE 3.4*   ALKPHOS 167*   ALT 30   AST 22   BILITOT 0.6            Assessment/Plan:     ROSLYN (acute kidney injury)  At time of re-consult: Ms Connelly is a 53 y/o female who is s/p CABG. Nephrology re-consulted for ROSLYN, decreased UOP, and concern for volume overload. Patient is now anuric with sCr trending up (4.4 on 1/16/20). Patient with metabolic acidosis on ABG/serum CO2 of 19. Patient now with concern for volume overload and now intubated after progressively increased oxygen requirements. Concern for pulmonary edema on CXR.     Plan/recommendations:   -hold RRT today  -600 ml of UOP this morning.  Monitor.  -Renal diet   -Strict I/O's   -Renally dose medications   -Avoid nephrotoxic medications  -Daily renal function panels   -Maintain Hgb >7.0  -Will discuss with Dr. Lange.        Agustin Vang, ANDREA  Nephrology  [facility]

## 2020-02-03 NOTE — PT/OT/SLP PROGRESS
Occupational Therapy   Treatment    Name: Suyapa Connelly  MRN: [id number]  Admitting Diagnosis:  Acute coronary syndrome  7 Days Post-Op    Recommendations:     Discharge Recommendations: rehabilitation facility  Discharge Equipment Recommendations:  shower chair, 3-in-1 commode  Barriers to discharge:  Decreased caregiver support, Inaccessible home environment    Assessment:     Suyapa Connelly is a 54 y.o. female with a medical diagnosis of Acute coronary syndrome.  She presents with fatigue, frustration w/ care toward staff.  Pt complained of a lack of privacy and independence during her stay.  She also feels that she has not received enough medical treatment to get her home.  Performance deficits affecting function are weakness, impaired endurance, impaired self care skills, impaired functional mobilty, gait instability, impaired balance, decreased upper extremity function, decreased lower extremity function, decreased safety awareness, impaired coordination, impaired fine motor, edema, impaired skin, impaired cardiopulmonary response to activity.     Rehab Prognosis:  Fair; patient would benefit from acute skilled OT services to address these deficits and reach maximum level of function.       Plan:     Patient to be seen 4 x/week to address the above listed problems via self-care/home management, therapeutic activities, therapeutic exercises  · Plan of Care Expires: 02/21/20  · Plan of Care Reviewed with: patient    Subjective     Pain/Comfort:  · Pain Rating 1: 0/10  · Pain Rating Post-Intervention 1: 0/10    Objective:     Communicated with: RN prior to session.  Patient found up in chair with telemetry, oxygen upon OT entry to room.    General Precautions: Standard, fall   Orthopedic Precautions:N/A   Braces: N/A     Occupational Performance:     Bed Mobility:    · NT     Functional Mobility/Transfers:  · Patient completed Sit <> Stand Transfer with moderate assistance  with  hand-held assist   Functional  Mobility:  Pt was able to walk short household distances w/ HHA x1, and CGA w/ chair follow for safety.       Activities of Daily Living:  · Pt refused to demo.       [facility] 6 Click ADL: 17    Treatment & Education:  -Pt edu on OT role/POC  -Importance of OOB activity with staff assistance  -Safety during functional t/f and mobility  - White board updated  - Multiple self care tasks/functional mobility completed-- assistance level noted above  - All questions/concerns answered within OT scope of practice    Patient left up in chair with all lines intact, call button in reach and RN notifiedEducation:      GOALS:   Multidisciplinary Problems     Occupational Therapy Goals        Problem: Occupational Therapy Goal    Goal Priority Disciplines Outcome Interventions   Occupational Therapy Goal     OT, PT/OT Ongoing, Progressing    Description:  Goals to be met by: 2/1/20     Patient will increase functional independence with ADLs by performing:    Feeding with [place].  UE Dressing with Minimal Assistance.  LE Dressing with Moderate Assistance.  Grooming while standing at sink with Contact Guard Assistance. - progressing towards   Toileting from toilet with Moderate Assistance for hygiene and clothing management. -updated 1/27/2020  Toilet transfer to toilet with Minimal Assistance. -updated 1/27/2020                        Time Tracking:     OT Date of Treatment: 02/03/20  OT Start Time: 1352  OT Stop Time: 1418  OT Total Time (min): 26 min    Billable Minutes:Self Care/Home Management 26 mins    Gilberto Novak, OT  2/3/2020

## 2020-02-04 ENCOUNTER — TELEPHONE (OUTPATIENT)
Dept: ENDOCRINOLOGY | Facility: HOSPITAL | Age: 55
End: 2020-02-04

## 2020-02-04 VITALS
HEIGHT: 64 IN | OXYGEN SATURATION: 93 % | TEMPERATURE: 98 F | BODY MASS INDEX: 34.48 KG/M2 | RESPIRATION RATE: 18 BRPM | SYSTOLIC BLOOD PRESSURE: 140 MMHG | WEIGHT: 201.94 LBS | HEART RATE: 65 BPM | DIASTOLIC BLOOD PRESSURE: 67 MMHG

## 2020-02-04 LAB
ALBUMIN SERPL BCP-MCNC: 2 G/DL (ref 3.5–5.2)
ALP SERPL-CCNC: 154 U/L (ref 55–135)
ALT SERPL W/O P-5'-P-CCNC: 26 U/L (ref 10–44)
ANION GAP SERPL CALC-SCNC: 14 MMOL/L (ref 8–16)
AST SERPL-CCNC: 23 U/L (ref 10–40)
BASOPHILS # BLD AUTO: 0.05 K/UL (ref 0–0.2)
BASOPHILS NFR BLD: 0.6 % (ref 0–1.9)
BILIRUB SERPL-MCNC: 0.5 MG/DL (ref 0.1–1)
BUN SERPL-MCNC: 22 MG/DL (ref 6–20)
CALCIUM SERPL-MCNC: 8.6 MG/DL (ref 8.7–10.5)
CHLORIDE SERPL-SCNC: 96 MMOL/L (ref 95–110)
CO2 SERPL-SCNC: 24 MMOL/L (ref 23–29)
CREAT SERPL-MCNC: 3.2 MG/DL (ref 0.5–1.4)
DIFFERENTIAL METHOD: ABNORMAL
EOSINOPHIL # BLD AUTO: 0.1 K/UL (ref 0–0.5)
EOSINOPHIL NFR BLD: 0.7 % (ref 0–8)
ERYTHROCYTE [DISTWIDTH] IN BLOOD BY AUTOMATED COUNT: 15.7 % (ref 11.5–14.5)
EST. GFR  (AFRICAN AMERICAN): 18.2 ML/MIN/1.73 M^2
EST. GFR  (NON AFRICAN AMERICAN): 15.8 ML/MIN/1.73 M^2
GLUCOSE SERPL-MCNC: 135 MG/DL (ref 70–110)
HCT VFR BLD AUTO: 27.4 % (ref 37–48.5)
HGB BLD-MCNC: 8.3 G/DL (ref 12–16)
IMM GRANULOCYTES # BLD AUTO: 0.02 K/UL (ref 0–0.04)
IMM GRANULOCYTES NFR BLD AUTO: 0.2 % (ref 0–0.5)
INR PPP: 1.2 (ref 0.8–1.2)
LYMPHOCYTES # BLD AUTO: 1.2 K/UL (ref 1–4.8)
LYMPHOCYTES NFR BLD: 14.1 % (ref 18–48)
MAGNESIUM SERPL-MCNC: 1.8 MG/DL (ref 1.6–2.6)
MCH RBC QN AUTO: 29.2 PG (ref 27–31)
MCHC RBC AUTO-ENTMCNC: 30.3 G/DL (ref 32–36)
MCV RBC AUTO: 97 FL (ref 82–98)
MONOCYTES # BLD AUTO: 1.3 K/UL (ref 0.3–1)
MONOCYTES NFR BLD: 15.9 % (ref 4–15)
NEUTROPHILS # BLD AUTO: 5.7 K/UL (ref 1.8–7.7)
NEUTROPHILS NFR BLD: 68.5 % (ref 38–73)
NRBC BLD-RTO: 0 /100 WBC
PHOSPHATE SERPL-MCNC: 2.8 MG/DL (ref 2.7–4.5)
PLATELET # BLD AUTO: 440 K/UL (ref 150–350)
PMV BLD AUTO: 10.6 FL (ref 9.2–12.9)
POCT GLUCOSE: 156 MG/DL (ref 70–110)
POCT GLUCOSE: 189 MG/DL (ref 70–110)
POTASSIUM SERPL-SCNC: 3.7 MMOL/L (ref 3.5–5.1)
PROT SERPL-MCNC: 7.5 G/DL (ref 6–8.4)
PROTHROMBIN TIME: 12.3 SEC (ref 9–12.5)
RBC # BLD AUTO: 2.84 M/UL (ref 4–5.4)
SODIUM SERPL-SCNC: 134 MMOL/L (ref 136–145)
WBC # BLD AUTO: 8.31 K/UL (ref 3.9–12.7)

## 2020-02-04 PROCEDURE — 83735 ASSAY OF MAGNESIUM: CPT

## 2020-02-04 PROCEDURE — 27000221 HC OXYGEN, UP TO 24 HOURS

## 2020-02-04 PROCEDURE — 25000003 PHARM REV CODE 250: Performed by: NURSE PRACTITIONER

## 2020-02-04 PROCEDURE — 25000003 PHARM REV CODE 250: Performed by: STUDENT IN AN ORGANIZED HEALTH CARE EDUCATION/TRAINING PROGRAM

## 2020-02-04 PROCEDURE — 36415 COLL VENOUS BLD VENIPUNCTURE: CPT

## 2020-02-04 PROCEDURE — 80053 COMPREHEN METABOLIC PANEL: CPT

## 2020-02-04 PROCEDURE — 63600175 PHARM REV CODE 636 W HCPCS: Performed by: PHYSICIAN ASSISTANT

## 2020-02-04 PROCEDURE — 99233 PR SUBSEQUENT HOSPITAL CARE,LEVL III: ICD-10-PCS | Mod: ,,, | Performed by: NURSE PRACTITIONER

## 2020-02-04 PROCEDURE — 63600175 PHARM REV CODE 636 W HCPCS: Performed by: STUDENT IN AN ORGANIZED HEALTH CARE EDUCATION/TRAINING PROGRAM

## 2020-02-04 PROCEDURE — 85610 PROTHROMBIN TIME: CPT

## 2020-02-04 PROCEDURE — 84100 ASSAY OF PHOSPHORUS: CPT

## 2020-02-04 PROCEDURE — 85025 COMPLETE CBC W/AUTO DIFF WBC: CPT

## 2020-02-04 PROCEDURE — 25000003 PHARM REV CODE 250: Performed by: THORACIC SURGERY (CARDIOTHORACIC VASCULAR SURGERY)

## 2020-02-04 PROCEDURE — 94761 N-INVAS EAR/PLS OXIMETRY MLT: CPT

## 2020-02-04 PROCEDURE — 99233 SBSQ HOSP IP/OBS HIGH 50: CPT | Mod: ,,, | Performed by: NURSE PRACTITIONER

## 2020-02-04 RX ORDER — PEN NEEDLE, DIABETIC 30 GX3/16"
30 NEEDLE, DISPOSABLE MISCELLANEOUS 2 TIMES DAILY WITH MEALS
Qty: 100 EACH | Refills: 0 | Status: SHIPPED | OUTPATIENT
Start: 2020-02-04 | End: 2020-03-05

## 2020-02-04 RX ORDER — INSULIN ASPART 100 [IU]/ML
9 INJECTION, SOLUTION INTRAVENOUS; SUBCUTANEOUS
Qty: 10 ML | Refills: 0 | Status: ON HOLD | OUTPATIENT
Start: 2020-02-04 | End: 2020-07-17

## 2020-02-04 RX ORDER — OXYCODONE HYDROCHLORIDE 5 MG/1
5 TABLET ORAL EVERY 4 HOURS PRN
Qty: 42 TABLET | Refills: 0 | Status: SHIPPED | OUTPATIENT
Start: 2020-02-04 | End: 2020-02-11

## 2020-02-04 RX ORDER — PEN NEEDLE, DIABETIC 30 GX3/16"
1 NEEDLE, DISPOSABLE MISCELLANEOUS 2 TIMES DAILY WITH MEALS
Qty: 100 EACH | Refills: 0 | COMMUNITY
Start: 2020-02-04 | End: 2020-03-05

## 2020-02-04 RX ORDER — CEPHALEXIN 500 MG/1
500 CAPSULE ORAL EVERY 8 HOURS
Qty: 30 CAPSULE | Refills: 0 | Status: SHIPPED | OUTPATIENT
Start: 2020-02-04 | End: 2020-02-14

## 2020-02-04 RX ORDER — AMIODARONE HYDROCHLORIDE 400 MG/1
400 TABLET ORAL 2 TIMES DAILY
Qty: 60 TABLET | Refills: 11 | Status: SHIPPED | OUTPATIENT
Start: 2020-02-04 | End: 2020-02-04

## 2020-02-04 RX ORDER — INSULIN GLARGINE 100 [IU]/ML
10 INJECTION, SOLUTION SUBCUTANEOUS NIGHTLY
Qty: 9 ML | Refills: 1 | Status: SHIPPED | OUTPATIENT
Start: 2020-02-04 | End: 2021-01-20 | Stop reason: SDUPTHER

## 2020-02-04 RX ORDER — METOPROLOL SUCCINATE 50 MG/1
50 TABLET, EXTENDED RELEASE ORAL DAILY
Qty: 30 TABLET | Refills: 11 | Status: SHIPPED | OUTPATIENT
Start: 2020-02-05 | End: 2020-03-16 | Stop reason: ALTCHOICE

## 2020-02-04 RX ORDER — CLOPIDOGREL BISULFATE 75 MG/1
75 TABLET ORAL DAILY
Qty: 30 TABLET | Refills: 11 | Status: ON HOLD | OUTPATIENT
Start: 2020-02-05 | End: 2021-06-08 | Stop reason: HOSPADM

## 2020-02-04 RX ORDER — SEVELAMER CARBONATE 800 MG/1
800 TABLET, FILM COATED ORAL
Qty: 90 TABLET | Refills: 11 | Status: SHIPPED | OUTPATIENT
Start: 2020-02-04 | End: 2020-02-06 | Stop reason: ALTCHOICE

## 2020-02-04 RX ORDER — AMIODARONE HYDROCHLORIDE 400 MG/1
400 TABLET ORAL DAILY
Qty: 30 TABLET | Refills: 11 | Status: SHIPPED | OUTPATIENT
Start: 2020-02-04 | End: 2020-09-30

## 2020-02-04 RX ADMIN — CEFTRIAXONE SODIUM 1 G: 1 INJECTION, POWDER, FOR SOLUTION INTRAMUSCULAR; INTRAVENOUS at 09:02

## 2020-02-04 RX ADMIN — HEPARIN SODIUM 5000 UNITS: 5000 INJECTION, SOLUTION INTRAVENOUS; SUBCUTANEOUS at 01:02

## 2020-02-04 RX ADMIN — ESCITALOPRAM 10 MG: 5 TABLET, FILM COATED ORAL at 09:02

## 2020-02-04 RX ADMIN — PANTOPRAZOLE SODIUM 40 MG: 40 TABLET, DELAYED RELEASE ORAL at 09:02

## 2020-02-04 RX ADMIN — SEVELAMER CARBONATE 800 MG: 800 TABLET, FILM COATED ORAL at 11:02

## 2020-02-04 RX ADMIN — METOPROLOL SUCCINATE 50 MG: 50 TABLET, EXTENDED RELEASE ORAL at 09:02

## 2020-02-04 RX ADMIN — ASPIRIN 81 MG CHEWABLE TABLET 81 MG: 81 TABLET CHEWABLE at 09:02

## 2020-02-04 RX ADMIN — CLOPIDOGREL BISULFATE 75 MG: 75 TABLET, FILM COATED ORAL at 09:02

## 2020-02-04 RX ADMIN — INSULIN ASPART 4 UNITS: 100 INJECTION, SOLUTION INTRAVENOUS; SUBCUTANEOUS at 11:02

## 2020-02-04 RX ADMIN — INSULIN ASPART 4 UNITS: 100 INJECTION, SOLUTION INTRAVENOUS; SUBCUTANEOUS at 07:02

## 2020-02-04 RX ADMIN — ONDANSETRON 4 MG: 2 INJECTION INTRAMUSCULAR; INTRAVENOUS at 11:02

## 2020-02-04 RX ADMIN — HEPARIN SODIUM 5000 UNITS: 5000 INJECTION, SOLUTION INTRAVENOUS; SUBCUTANEOUS at 06:02

## 2020-02-04 RX ADMIN — AMIODARONE HYDROCHLORIDE 400 MG: 200 TABLET ORAL at 09:02

## 2020-02-04 RX ADMIN — SEVELAMER CARBONATE 800 MG: 800 TABLET, FILM COATED ORAL at 09:02

## 2020-02-04 NOTE — PLAN OF CARE
02/04/20 1359   Post-Acute Status   Post-Acute Authorization Dialysis   Diaylsis Status Set-up Complete     SW informed by Renuka with [facility] that pt was approved by their medical director for dialysis Mon/Wed/Fri 11:00am with start date tomorrow 2/5/20 at 10:30am.  SW relayed this information to pt and .    Jenna Bonilla, IKER  [facility]  [id number]

## 2020-02-04 NOTE — ASSESSMENT & PLAN NOTE
At time of re-consult: Ms Connelly is a 55 y/o female who is s/p CABG. Nephrology re-consulted for ROSLYN, decreased UOP, and concern for volume overload. Patient is now anuric with sCr trending up (4.4 on 1/16/20). Patient with metabolic acidosis on ABG/serum CO2 of 19. Patient now with concern for volume overload and now intubated after progressively increased oxygen requirements. Concern for pulmonary edema on CXR.     Plan/recommendations:    -Would recommend lasx IV 80mg BID for volume management and continue to monitor renal function    -hold RRT today  -Renal diet   -Strict I/O's   -Renally dose medications   -Avoid nephrotoxic medications  -Daily renal function panels   -Maintain Hgb >7.0  -Will discuss with Dr. Lange.

## 2020-02-04 NOTE — PLAN OF CARE
EDUARD left voicemail for [facility] requesting call back to to follow up on status of HD chair.    Jenna Bonilla LMSW  [facility]  [id number]

## 2020-02-04 NOTE — DISCHARGE SUMMARY
[facility]  Cardiothoracic Surgery  Discharge Summary      Patient Name: Suyapa Connelly  MRN: [id number]  Admission Date: 1/2/2020  Hospital Length of Stay: 33 days  Discharge Date and Time:  02/04/2020 2:19 PM  Attending Physician: Huang Altamirano MD   Discharging Provider: Genny Ortez NP  Primary Care Provider: Erlinda Rahman NP    HPI:   Ms. Connelly is a 54 year-old woman with a history of heart failure reduced ejection fraction (35% ejection fraction) due to ischemic cardiomyopathy, known coronary artery disease s/p MI (2012), peripheral arterial disease s/p stents to bilateral lower extremities (2015), hypertension, and poorly controlled diabetes (HbA1C of 10.3 12/7/2019 and 9.7 on 1/3/2020), who was admitted with NSTEMI (troponin max 0.17) and heart failure exasperation (BNP 1,013), which was complicated by acute kidney injury.  Prior to this episode, she has been symptomatic with dyspnea on exertion (about 1 block) and claudication (at 50 feet).  Her most recent echocardiogram showed 35% ejection fraction, LVEDD of 5.2cm, mild to moderate mitral regurgitation, mild to moderate tricuspid regurgitation, estimated systolic PA pressure of 49mmHg.  Angiogram showed 90% early mid LAD lesion with a moderate sized distal LAD target in a very long wrap around LAD, significant disease in a small first diagonal artery and diffusely diseased second diagonal artery, 70% lesion in mid LCx, and diffusely diseased small RCA with the inferior septum being supplied by the wrap around LAD (minimal PDA noted).  CTA chest showed minimal ascending aortic calcifications and mild aortic arch calcifications.  CTA of the lower extremities showed severe peripheral vascular disease with multiple total occlusion and significant stenoses.     We had an extensive discussion with her regarding her symptoms and disease, particularly focusing on the very poor control of her diabetes in which she has worsening  ischemic heart disease and worsening peripheral vascular disease.  He HbA1C has been improving since seeing Dr. Fernando in early December and this is her second heart failure admission in one month, so we should proceed with surgery, which will be hybrid revascularization with off pump coronary artery bypass surgery (LIMA-LAD) and postoperative percutaneous coronary intervention.  Her significant lower extremity peripheral vascular disease is a concern.  The risk of lower extremity ischemia is high.  Additionally, the risk of sternal wound infection is high due to her poor diabetic control and her risk of recurrent acute kidney injury/worsened kidney function is increased due to preoperative acute kidney injury.  I had an extensive conversation with her and her family about these risks and they agreed to proceed with surgery.    Procedure(s) (LRB):  Insertion, Catheter, Central Venous, Hemodialysis (N/A)      Indwelling Lines/Drains at time of discharge:   Lines/Drains/Airways     Central Venous Catheter Line                 Hemodialysis Catheter 01/27/20 1624 left subclavian 7 days          Pressure Ulcer                 Pressure Injury 01/30/20 1505 midline Gluteal cleft Stage 2 4 days              Hospital Course: On 1/14/20 the patient was taken to the Operating Room for the above stated procedure. Please see the previously dictated operative report for complete details. Postoperatively, the patient was taken from the  Operating Room to the ICU where the vital signs were monitored and pain was kept under control. The patient was weaned from the drips and extubated in the ICU per protocol. Pt had severe ROSLYN requiring dialysis and patient was set up with outpatient HD, patient kidney function was improving and pt was making urine. Once hemodynamically stable, the patient was transferred to the Cardiac Step-Down floor for continued strengthening and ambulation. On postoperative day 22, the patient was ready for  discharge to home. At the time of discharge, the patient was ambulating unassisted. Pain was well controlled with oral analgesics and the patient was tolerating the diet.     MOBILITY AND ACTIVITY: As tolerated. Patient may shower. No heavy lifting of greater than 5 pounds and no driving.     DIET: An 1800-calorie ADA with a 1500 mL fluid restriction.     WOUND CARE INSTRUCTIONS: Check for redness, swelling and drainage around the  incision or wound. Patient is to call for any obvious bleeding, drainage, pus from the wound, unusual problems or difficulties or temperature of greater than 101 degrees.     FOLLOWUP: Follow up with   in approximately 6 weeks. Prior to this  appointment, the patient will have a chest x-ray and EKG.     Patient not placed on Ace-Inhibitor at the time of discharge due to ROSLYN that required dialysis.      DISCHARGE CONDITION: At the time of discharge, the patient was in sinus rhythm and afebrile with stable vital signs.      Consults (From admission, onward)        Status Ordering Provider     Consult to Endocrinology  Once     Provider:  (Not yet assigned)    Completed KWADWO CRUZ     Inpatient consult to Cardiology  Once     Provider:  (Not yet assigned)    Completed PRISCILA BRADSHAW     Inpatient consult to Cardiothoracic Surgery  Once     Provider:  (Not yet assigned)    Completed PRISCILA BRADSHAW     Inpatient consult to Endocrinology  Once     Provider:  (Not yet assigned)    Completed MAVIS ROMERO     Inpatient consult to Infectious Diseases  Once     Provider:  (Not yet assigned)    Completed SUSAN COREAS     Inpatient consult to Nephrology  Once     Provider:  (Not yet assigned)    Completed JUAN VAZQUEZ     Inpatient consult to Nephrology  Once     Provider:  (Not yet assigned)    Completed KWADWO CRUZ     Inpatient consult to PICC team ([facility])  Once     Provider:  (Not yet assigned)    Completed ANN NORTON     Inpatient  consult to PICC team ([facility])  Once     Provider:  (Not yet assigned)    Completed SUMAN SIMONS     Inpatient consult to Registered Dietitian/Nutritionist  Once     Provider:  (Not yet assigned)    Completed KWADWO CRUZ     Inpatient consult to Vascular Surgery  Once     Provider:  (Not yet assigned)    Completed CARLOS HUMPHREY          Pending Diagnostic Studies:     None          No new Assessment & Plan notes have been filed under this hospital service since the last note was generated.  Service: Cardiothoracic Surgery    Final Active Diagnoses:    Diagnosis Date Noted POA    PRINCIPAL PROBLEM:  S/P CABG x 1 [Z95.1] 01/27/2020 Not Applicable    Alteration in skin integrity in adult [R23.9] 01/31/2020 Yes    Preventive measure [Z29.9] 01/31/2020 Not Applicable    Paroxysmal atrial fibrillation [I48.0] 01/28/2020 No    Acute blood loss anemia [D62] 01/27/2020 No    Fluid overload [E87.70]  No    Weakness [R53.1] 01/05/2020 Yes    Contrast dye induced nephropathy [N14.1, T50.8X5A] 01/04/2020 No    Acute coronary syndrome [I24.9] 01/02/2020 Yes    Acute on chronic combined systolic (congestive) and diastolic (congestive) heart failure [I50.43] 01/02/2020 Yes    Type 2 diabetes mellitus with hyperglycemia, with long-term current use of insulin [E11.65, Z79.4] 01/02/2020 Not Applicable    CAD (coronary artery disease) [I25.10] 01/02/2020 Yes    Mixed hyperlipidemia [E78.2] 12/13/2019 Yes    Non-intractable vomiting with nausea [R11.2] 07/20/2018 Yes    Essential hypertension [I10] 05/05/2018 Yes    Peripheral vascular disease [I73.9] 10/06/2015 Yes    ROSLYN (acute kidney injury) [N17.9] 07/07/2015 Yes      Problems Resolved During this Admission:      Discharged Condition: stable    Disposition: Home or Self Care    Follow Up:  Follow-up Information     Huang Altamirano MD In 6 weeks.    Specialty:  Cardiothoracic Surgery  Contact information:  [address]  [facility]  [address]  [phone]                 Patient Instructions:      Ambulatory referral/consult to Physical Therapy   Standing Status: Future   Referral Priority: Routine Referral Type: Physical Medicine   Referral Reason: Specialty Services Required   Requested Specialty: Physical Therapy   Number of Visits Requested: 1     Ambulatory referral/consult to Occupational Therapy   Standing Status: Future   Referral Priority: Routine Referral Type: Physical Medicine   Referral Reason: Specialty Services Required   Requested Specialty: Occupational Therapy   Number of Visits Requested: 1     Medications:  Reconciled Home Medications:      Medication List      START taking these medications    amiodarone 400 MG tablet  Commonly known as:  PACERONE  Take 1 tablet (400 mg total) by mouth daily.     cephALEXin 500 MG capsule  Commonly known as:  KEFLEX  Take 1 capsule (500 mg total) by mouth every 8 (eight) hours. for 10 days     clopidogreL 75 mg tablet  Commonly known as:  PLAVIX  Take 1 tablet (75 mg total) by mouth once daily.  Start taking on:  February 5, 2020     metoprolol succinate 50 MG 24 hr tablet  Commonly known as:  TOPROL-XL  Take 1 tablet (50 mg total) by mouth once daily.  Start taking on:  February 5, 2020     oxyCODONE 5 MG immediate release tablet  Commonly known as:  ROXICODONE  Take 1 tablet (5 mg total) by mouth every 4 (four) hours as needed for Pain.     sevelamer carbonate 800 mg Tab  Commonly known as:  RENVELA  Take 1 tablet (800 mg total) by mouth 3 (three) times daily with meals.        CHANGE how you take these medications    insulin aspart U-100 100 unit/mL injection  Commonly known as:  NOVOLOG  Inject 9 Units into the skin 3 (three) times daily with meals.  What changed:  how much to take     insulin glargine 100 units/mL (3mL) SubQ pen  Inject 10 Units into the skin every evening.  What changed:  how much to take     OneTouch Ultra Blue Test Strip Strp  Generic drug:  blood sugar diagnostic  Use  "to test blood glucose twice daily  What changed:  additional instructions        CONTINUE taking these medications    acetaminophen 500 MG tablet  Commonly known as:  TYLENOL  Take 1,000 mg by mouth daily as needed for Pain.     aspirin 81 MG Chew  Take 1 tablet (81 mg total) by mouth once daily.     atorvastatin 80 MG tablet  Commonly known as:  LIPITOR  Take 1 tablet (80 mg total) by mouth once daily.     escitalopram oxalate 10 MG tablet  Commonly known as:  LEXAPRO  Take 1 tablet (10 mg total) by mouth once daily.     gabapentin 300 MG capsule  Commonly known as:  NEURONTIN  Take 1 capsule (300 mg total) by mouth 3 (three) times daily.     multivitamin per tablet  Commonly known as:  THERAGRAN  Take 1 tablet by mouth once daily.     ondansetron 8 MG Tbdl  Commonly known as:  ZOFRAN-ODT  Dissolve 1 tablet (8 mg total) by mouth every 8 (eight) hours as needed.     OneTouch Delica Plus Lancet 30 gauge Misc  Generic drug:  lancets  use to test blood glucose 2 (two) times daily with meals.     OneTouch Ultra2 Meter Misc  Generic drug:  blood-glucose meter  Use to test blood glucose     * pen needle, diabetic 31 gauge x 5/16" Ndle  Use to inject insulin 2 (two) times daily with meals.     * pen needle, diabetic 32 gauge x 5/32" Ndle  1 each by Misc.(Non-Drug; Combo Route) route 2 (two) times daily with meals.         * This list has 2 medication(s) that are the same as other medications prescribed for you. Read the directions carefully, and ask your doctor or other care provider to review them with you.            STOP taking these medications    carvediloL 12.5 MG tablet  Commonly known as:  COREG     famotidine 20 MG tablet  Commonly known as:  PEPCID     hydrOXYzine pamoate 50 MG Cap  Commonly known as:  VISTARIL     lisinopril 40 MG tablet  Commonly known as:  PRINIVIL,ZESTRIL     torsemide 20 MG Tab  Commonly known as:  DEMADEX          Time spent on the discharge of patient: 35 minutes    Genny Ortez" NP  Cardiothoracic Surgery  [facility]

## 2020-02-04 NOTE — HOSPITAL COURSE
On 1/14/20 the patient was taken to the Operating Room for the above stated procedure. Please see the previously dictated operative report for complete details. Postoperatively, the patient was taken from the  Operating Room to the ICU where the vital signs were monitored and pain was kept under control. The patient was weaned from the drips and extubated in the ICU per protocol. Pt had severe ROSLYN requiring dialysis and patient was set up with outpatient HD, patient kidney function was improving and pt was making urine. Once hemodynamically stable, the patient was transferred to the Cardiac Step-Down floor for continued strengthening and ambulation. On postoperative day 22, the patient was ready for discharge to home. At the time of discharge, the patient was ambulating unassisted. Pain was well controlled with oral analgesics and the patient was tolerating the diet.     MOBILITY AND ACTIVITY: As tolerated. Patient may shower. No heavy lifting of greater than 5 pounds and no driving.     DIET: An 1800-calorie ADA with a 1500 mL fluid restriction.     WOUND CARE INSTRUCTIONS: Check for redness, swelling and drainage around the  incision or wound. Patient is to call for any obvious bleeding, drainage, pus from the wound, unusual problems or difficulties or temperature of greater than 101 degrees.     FOLLOWUP: Follow up with   in approximately 6 weeks. Prior to this  appointment, the patient will have a chest x-ray and EKG.     Patient not placed on Ace-Inhibitor at the time of discharge due to ROSLYN that required dialysis.      DISCHARGE CONDITION: At the time of discharge, the patient was in sinus rhythm and afebrile with stable vital signs.

## 2020-02-04 NOTE — SUBJECTIVE & OBJECTIVE
Interval History:   Sitting in bedside chair this morning, c/o dyspnea on exertion. Oxygenation stable. Last had HD on 600ml UOP/past 24hrs. sCr down to 3.2 this morning, from 3.4 yesterday. NAEON.     Review of patient's allergies indicates:   Allergen Reactions    Contrast media      Kidney injury    Pcn [penicillins]      Rash; tolerated ceftriaxone on 1/13/20     Current Facility-Administered Medications   Medication Frequency    0.9%  NaCl infusion PRN    0.9%  NaCl infusion Once    acetaminophen tablet 650 mg Q6H PRN    albuterol-ipratropium 2.5 mg-0.5 mg/3 mL nebulizer solution 3 mL Q6H PRN    amiodarone tablet 400 mg BID    aspirin chewable tablet 81 mg Daily    atorvastatin tablet 80 mg QHS    bisacodyl suppository 10 mg Q12H PRN    cefTRIAXone injection 1 g Q12H    clopidogrel tablet 75 mg Daily    dextrose 10% (D10W) Bolus PRN    dextrose 10% (D10W) Bolus PRN    escitalopram oxalate tablet 10 mg Daily    glucagon (human recombinant) injection 1 mg PRN    glucose chewable tablet 16 g PRN    glucose chewable tablet 24 g PRN    heparin (porcine) injection 1,000 Units PRN    heparin (porcine) injection 5,000 Units Q8H    heparin infusion 1,000 units/500 ml in 0.9% NaCl (pressure line flush) Continuous PRN    heparin, porcine (PF) injection 1,000 Units PRN    HYDROmorphone injection 0.2 mg Q1H PRN    insulin aspart U-100 pen 0-5 Units QID (AC + HS) PRN    insulin aspart U-100 pen 4 Units TIDWM    metoclopramide HCl injection 10 mg Q6H PRN    metoprolol injection 5 mg Q5 Min PRN    metoprolol succinate (TOPROL-XL) 24 hr tablet 50 mg Daily    ondansetron injection 4 mg Q6H PRN    oxyCODONE immediate release tablet 10 mg Q4H PRN    oxyCODONE immediate release tablet 5 mg Q4H PRN    pantoprazole EC tablet 40 mg BID    polyethylene glycol packet 17 g Daily    scopolamine 1.3-1.5 mg (1 mg over 3 days) 1 patch Q3 Days    sevelamer carbonate tablet 800 mg TID WM       Objective:      Vital Signs (Most Recent):  Temp: 97.8 °F (36.6 °C) (02/04/20 0713)  Pulse: 73 (02/04/20 0850)  Resp: 18 (02/04/20 0850)  BP: (!) 159/72 (02/04/20 0713)  SpO2: 97 % (02/04/20 0850)  O2 Device (Oxygen Therapy): nasal cannula (02/04/20 0850) Vital Signs (24h Range):  Temp:  [97.8 °F (36.6 °C)-98.3 °F (36.8 °C)] 97.8 °F (36.6 °C)  Pulse:  [61-73] 73  Resp:  [16-18] 18  SpO2:  [95 %-100 %] 97 %  BP: (124-159)/(60-72) 159/72     Weight: 91.6 kg (201 lb 15.1 oz) (02/04/20 0700)  Body mass index is 34.66 kg/m².  Body surface area is 2.03 meters squared.    I/O last 3 completed shifts:  In: 1020 [P.O.:1020]  Out: 601 [Urine:600; Stool:1]    Physical Exam   Constitutional: She is oriented to person, place, and time. She appears well-developed and well-nourished. No distress.   HENT:   Head: Normocephalic and atraumatic.   Eyes: Pupils are equal, round, and reactive to light. EOM are normal.   Neck: Normal range of motion. No JVD present.   Cardiovascular: Normal rate, regular rhythm and normal pulses.   Pulmonary/Chest: Effort normal. No respiratory distress. She has no rales.   On 3L nasal cannula    Abdominal: Soft. She exhibits no distension.   Musculoskeletal: Normal range of motion.   Neurological: She is alert and oriented to person, place, and time.   Skin: Skin is warm, dry and intact. Capillary refill takes less than 2 seconds. She is not diaphoretic.   Midline sternal incision with some purulent drainage. Gauze dressing in place    Psychiatric: She has a normal mood and affect. Her speech is normal and behavior is normal. Judgment and thought content normal.   Vitals reviewed.      Significant Labs:  CBC:   Recent Labs   Lab 02/04/20  0635   WBC 8.31   RBC 2.84*   HGB 8.3*   HCT 27.4*   *   MCV 97   MCH 29.2   MCHC 30.3*     CMP:   Recent Labs   Lab 02/04/20  0635   *   CALCIUM 8.6*   ALBUMIN 2.0*   PROT 7.5   *   K 3.7   CO2 24   CL 96   BUN 22*   CREATININE 3.2*   ALKPHOS 154*   ALT 26    AST 23   BILITOT 0.5

## 2020-02-04 NOTE — NURSING
Pt discharged per MD orders.  Tele discontinued and returned to station.  IV discontinued; catheter tip intact 1.  Medication list and prescriptions reviewed; prescriptions sent to pt preferred pharmacy and printed prescriptions provided.  Pt verbalizes understanding of all written and verbal discharge instructions.  Pt transported to garage in wheelchair by transport.  with pt.

## 2020-02-04 NOTE — PROGRESS NOTES
[facility]  Nephrology  Progress Note    Patient Name: Suyapa Connelly  MRN: [id number]  Admission Date: 1/2/2020  Hospital Length of Stay: 33 days  Attending Provider: Huang Altamirano MD   Primary Care Physician: Erlinda Rahman NP  Principal Problem:Acute coronary syndrome      Interval History:   Sitting in bedside chair this morning, c/o dyspnea on exertion. Oxygenation stable. Last had HD on 600ml UOP/past 24hrs. sCr down to 3.2 this morning, from 3.4 yesterday. NAEON.     Review of patient's allergies indicates:   Allergen Reactions    Contrast media      Kidney injury    Pcn [penicillins]      Rash; tolerated ceftriaxone on 1/13/20     Current Facility-Administered Medications   Medication Frequency    0.9%  NaCl infusion PRN    0.9%  NaCl infusion Once    acetaminophen tablet 650 mg Q6H PRN    albuterol-ipratropium 2.5 mg-0.5 mg/3 mL nebulizer solution 3 mL Q6H PRN    amiodarone tablet 400 mg BID    aspirin chewable tablet 81 mg Daily    atorvastatin tablet 80 mg QHS    bisacodyl suppository 10 mg Q12H PRN    cefTRIAXone injection 1 g Q12H    clopidogrel tablet 75 mg Daily    dextrose 10% (D10W) Bolus PRN    dextrose 10% (D10W) Bolus PRN    escitalopram oxalate tablet 10 mg Daily    glucagon (human recombinant) injection 1 mg PRN    glucose chewable tablet 16 g PRN    glucose chewable tablet 24 g PRN    heparin (porcine) injection 1,000 Units PRN    heparin (porcine) injection 5,000 Units Q8H    heparin infusion 1,000 units/500 ml in 0.9% NaCl (pressure line flush) Continuous PRN    heparin, porcine (PF) injection 1,000 Units PRN    HYDROmorphone injection 0.2 mg Q1H PRN    insulin aspart U-100 pen 0-5 Units QID (AC + HS) PRN    insulin aspart U-100 pen 4 Units TIDWM    metoclopramide HCl injection 10 mg Q6H PRN    metoprolol injection 5 mg Q5 Min PRN    metoprolol succinate (TOPROL-XL) 24 hr tablet 50 mg Daily    ondansetron injection 4 mg Q6H PRN     oxyCODONE immediate release tablet 10 mg Q4H PRN    oxyCODONE immediate release tablet 5 mg Q4H PRN    pantoprazole EC tablet 40 mg BID    polyethylene glycol packet 17 g Daily    scopolamine 1.3-1.5 mg (1 mg over 3 days) 1 patch Q3 Days    sevelamer carbonate tablet 800 mg TID WM       Objective:     Vital Signs (Most Recent):  Temp: 97.8 °F (36.6 °C) (02/04/20 0713)  Pulse: 73 (02/04/20 0850)  Resp: 18 (02/04/20 0850)  BP: (!) 159/72 (02/04/20 0713)  SpO2: 97 % (02/04/20 0850)  O2 Device (Oxygen Therapy): nasal cannula (02/04/20 0850) Vital Signs (24h Range):  Temp:  [97.8 °F (36.6 °C)-98.3 °F (36.8 °C)] 97.8 °F (36.6 °C)  Pulse:  [61-73] 73  Resp:  [16-18] 18  SpO2:  [95 %-100 %] 97 %  BP: (124-159)/(60-72) 159/72     Weight: 91.6 kg (201 lb 15.1 oz) (02/04/20 0700)  Body mass index is 34.66 kg/m².  Body surface area is 2.03 meters squared.    I/O last 3 completed shifts:  In: 1020 [P.O.:1020]  Out: 601 [Urine:600; Stool:1]    Physical Exam   Constitutional: She is oriented to person, place, and time. She appears well-developed and well-nourished. No distress.   HENT:   Head: Normocephalic and atraumatic.   Eyes: Pupils are equal, round, and reactive to light. EOM are normal.   Neck: Normal range of motion. No JVD present.   Cardiovascular: Normal rate, regular rhythm and normal pulses.   Pulmonary/Chest: Effort normal. No respiratory distress. She has no rales.   On 3L nasal cannula    Abdominal: Soft. She exhibits no distension.   Musculoskeletal: Normal range of motion.   Neurological: She is alert and oriented to person, place, and time.   Skin: Skin is warm, dry and intact. Capillary refill takes less than 2 seconds. She is not diaphoretic.   Midline sternal incision with some purulent drainage. Gauze dressing in place    Psychiatric: She has a normal mood and affect. Her speech is normal and behavior is normal. Judgment and thought content normal.   Vitals reviewed.      Significant Labs:  CBC:    Recent Labs   Lab 02/04/20  0635   WBC 8.31   RBC 2.84*   HGB 8.3*   HCT 27.4*   *   MCV 97   MCH 29.2   MCHC 30.3*     CMP:   Recent Labs   Lab 02/04/20  0635   *   CALCIUM 8.6*   ALBUMIN 2.0*   PROT 7.5   *   K 3.7   CO2 24   CL 96   BUN 22*   CREATININE 3.2*   ALKPHOS 154*   ALT 26   AST 23   BILITOT 0.5            Assessment/Plan:     ROSLYN (acute kidney injury)  At time of re-consult: Ms Connelly is a 53 y/o female who is s/p CABG. Nephrology re-consulted for ROSLYN, decreased UOP, and concern for volume overload. Patient is now anuric with sCr trending up (4.4 on 1/16/20). Patient with metabolic acidosis on ABG/serum CO2 of 19. Patient now with concern for volume overload and now intubated after progressively increased oxygen requirements. Concern for pulmonary edema on CXR.     Plan/recommendations:    -Would recommend lasx IV 80mg BID for volume management and continue to monitor renal function    -hold RRT today  -Renal diet   -Strict I/O's   -Renally dose medications   -Avoid nephrotoxic medications  -Daily renal function panels   -Maintain Hgb >7.0  -Will discuss with Dr. Lange.          Thank you for your consult. I will follow-up with patient. Please contact us if you have any additional questions.    Randell Walker NP  Nephrology  [facility]

## 2020-02-04 NOTE — PLAN OF CARE
Pt d/c home with . Ambulatory consult placed for outpatient PT/OT.     Future Appointments   Date Time Provider Department Center   2/5/2020 12:15 PM DIALYSIS, [facility] DLYS [facility]   2/26/2020 10:15 AM EKG, APPT [facility] EKG [facility]   2/26/2020 10:30 AM Huang Altamirano MD [facility] CARDVAS [facility]        02/04/20 1630   Final Note   Assessment Type Final Discharge Note   Anticipated Discharge Disposition Home   Hospital Follow Up  Appt(s) scheduled? Yes   Discharge plans and expectations educations in teach back method with documentation complete? Yes     Julie Haase RN  Case Management [phone]

## 2020-02-04 NOTE — PLAN OF CARE
Problem: Adult Inpatient Plan of Care  Goal: Plan of Care Review  LOC: alert and oriented x 4.   SKIN: The skin is warm, dry. MSI dressing CDI.   RESPIRATORY: Respirations are WNL, even and unlabored. Normal effort and rate noted. No accessory muscle use noted. Pt. On 1 L NC.  CARDIAC: Patient runs normal sinus on the monitor.  ABDOMEN: Soft / rounded and non tender to palpation. No distention noted.   URINARY: continent; up to toilet with assistance.  EXTREMITIES: Extremities are WNL; general weakness throughout.  Neuro: Pt able to follow commands and is calm and cooperative with care.    POC reviewed with patient. VSS. Patient free of injuries and falls. Patient has no c/o pain or discomfort. Patient going to HD tomorrow morning 2/4. Patient receiving ceftriaxone q 12 hours. Patient receiving metoprolol and amiodarone for paroxysmal afib. Wound care done per orders. Patient repositioning herself independently. BG at 2200 was 135; no insulin given per sliding scale. Patient awaiting SNF/rehab placement. All questions were addressed. WCTM.

## 2020-02-05 ENCOUNTER — TELEPHONE (OUTPATIENT)
Dept: CARDIOTHORACIC SURGERY | Facility: CLINIC | Age: 55
End: 2020-02-05

## 2020-02-05 NOTE — TELEPHONE ENCOUNTER
Attempts to speak with someone with [facility] pharmacy regarding Ms. Connelly's Rx were unsuccessful , was put on hold for 5 mins. Will try again at a later time          ----- Message from Eda Guerra sent at 2/5/2020  3:38 PM CST -----  Contact: [facility]  Staff Message     Reason for call: Pharmacy needs to clarify the direction on Rx amiodarone (PACERONE) 400 MG tablet        Communication Preference: [phone]    Additional Information:

## 2020-02-05 NOTE — TELEPHONE ENCOUNTER
Attempted to contact pt following hospital discharge; no answer at either number and the voice mailboxes have not been set up.  Will call again tomorrow.

## 2020-02-06 ENCOUNTER — TELEPHONE (OUTPATIENT)
Dept: CARDIOTHORACIC SURGERY | Facility: CLINIC | Age: 55
End: 2020-02-06

## 2020-02-06 RX ORDER — SEVELAMER CARBONATE 800 MG/1
800 TABLET, FILM COATED ORAL
Qty: 90 TABLET | Refills: 11 | Status: SHIPPED | OUTPATIENT
Start: 2020-02-06 | End: 2021-01-20

## 2020-02-06 NOTE — TELEPHONE ENCOUNTER
Called pt following hospital discharge.  Spoke to pt's  and reiterated the need for pt to clean her incision everyday with soap and water, and to walk as much as she can.  Reminded pt's  that pt cannot drive for the first 4 weeks after surgery, and to refrain from lifting, pushing, and pulling anything greater than 5 pounds for the first 6 weeks following her surgery.  Instructed pt's  to have pt perform daily weights.  Pt's  instructed to notify the clinic if pt has a weight gain greater than 3 pounds in one day.  Pt's  instructed to call the clinic with any questions or concerns.  Pt's  verbalized understanding to all instructions.

## 2020-02-17 ENCOUNTER — TELEPHONE (OUTPATIENT)
Dept: CARDIOTHORACIC SURGERY | Facility: CLINIC | Age: 55
End: 2020-02-17

## 2020-02-17 NOTE — TELEPHONE ENCOUNTER
Called pt to inform her that Dr. Altamirano has rescheduled her post-op visit from February 26 to March 4.  No answer and unable to leave a VM at either of pt's phone numbers.  Left VM on pt's 's cell phone to call me back regarding her post-op visit with Dr. Altamirano.

## 2020-02-18 ENCOUNTER — DOCUMENTATION ONLY (OUTPATIENT)
Dept: REHABILITATION | Facility: HOSPITAL | Age: 55
End: 2020-02-18

## 2020-02-18 NOTE — PROGRESS NOTES
Missed Visit/Cancellation      Date: 2/18/2020     Canceled Number: 0  No Show Number: 1                                                                                                                Pt initially had visit scheduled for today for 0900.   Reason for cancellation: no show.    Pt's next scheduled physical therapy visit is: evaluation has not been rescheduled.    Bonita Rodriguez,DPT  2/18/2020

## 2020-02-20 ENCOUNTER — TELEPHONE (OUTPATIENT)
Dept: CARDIOTHORACIC SURGERY | Facility: CLINIC | Age: 55
End: 2020-02-20

## 2020-02-20 NOTE — TELEPHONE ENCOUNTER
After multiple unsuccessful attempts to contact the pt, I called and spoke with her emergency contact, her mother, Tea Sal.  I asked Ms. Sal for pt to call the clinic and for her to notify the pt that her post-op appt with Dr. Altamirano was rescheduled from Wednesday, February 26, to Wednesday, March 4.  Pt's mother verbalized understanding and stated that she would notify the pt.  Pt's mother informed that I am mailing a new appt slip to pt with her appts for March 4, and pt's mother verified her mailing address.

## 2020-02-27 ENCOUNTER — TELEPHONE (OUTPATIENT)
Dept: CARDIOTHORACIC SURGERY | Facility: CLINIC | Age: 55
End: 2020-02-27

## 2020-02-27 NOTE — TELEPHONE ENCOUNTER
Called and notified pt that her appt with Dr. Altamirano on March 4 was changed from 10:30 to 9:00, with her EKG at 8:30, as Dr. Altamirano has a surgery scheduled for that day.  Pt verbalized understanding.

## 2020-03-04 ENCOUNTER — DOCUMENTATION ONLY (OUTPATIENT)
Dept: CARDIOTHORACIC SURGERY | Facility: CLINIC | Age: 55
End: 2020-03-04

## 2020-03-04 ENCOUNTER — HOSPITAL ENCOUNTER (OUTPATIENT)
Dept: RADIOLOGY | Facility: HOSPITAL | Age: 55
Discharge: HOME | End: 2020-03-04
Attending: NURSE PRACTITIONER
Payer: MEDICAID

## 2020-03-04 ENCOUNTER — HOSPITAL ENCOUNTER (OUTPATIENT)
Dept: CARDIOLOGY | Facility: CLINIC | Age: 55
Discharge: HOME | End: 2020-03-04
Payer: MEDICAID

## 2020-03-04 ENCOUNTER — OFFICE VISIT (OUTPATIENT)
Dept: CARDIOTHORACIC SURGERY | Facility: CLINIC | Age: 55
End: 2020-03-04
Payer: MEDICAID

## 2020-03-04 VITALS
BODY MASS INDEX: 34.27 KG/M2 | HEART RATE: 88 BPM | OXYGEN SATURATION: 98 % | SYSTOLIC BLOOD PRESSURE: 172 MMHG | WEIGHT: 200.75 LBS | DIASTOLIC BLOOD PRESSURE: 93 MMHG | HEIGHT: 64 IN | TEMPERATURE: 98 F

## 2020-03-04 DIAGNOSIS — Z95.1 HX OF CABG: Primary | ICD-10-CM

## 2020-03-04 DIAGNOSIS — Z95.1 S/P CABG X 1: Primary | ICD-10-CM

## 2020-03-04 DIAGNOSIS — Z95.1 S/P CABG X 1: ICD-10-CM

## 2020-03-04 DIAGNOSIS — Z95.1 S/P CABG (CORONARY ARTERY BYPASS GRAFT): ICD-10-CM

## 2020-03-04 PROCEDURE — 93010 ELECTROCARDIOGRAM REPORT: CPT | Mod: S$PBB,,, | Performed by: INTERNAL MEDICINE

## 2020-03-04 PROCEDURE — 71046 X-RAY EXAM CHEST 2 VIEWS: CPT | Mod: 26,,, | Performed by: RADIOLOGY

## 2020-03-04 PROCEDURE — 93005 ELECTROCARDIOGRAM TRACING: CPT | Mod: PBBFAC | Performed by: INTERNAL MEDICINE

## 2020-03-04 PROCEDURE — 99214 OFFICE O/P EST MOD 30 MIN: CPT | Mod: PBBFAC,25 | Performed by: THORACIC SURGERY (CARDIOTHORACIC VASCULAR SURGERY)

## 2020-03-04 PROCEDURE — 99999 PR PBB SHADOW E&M-EST. PATIENT-LVL IV: ICD-10-PCS | Mod: PBBFAC,,, | Performed by: THORACIC SURGERY (CARDIOTHORACIC VASCULAR SURGERY)

## 2020-03-04 PROCEDURE — 93010 EKG 12-LEAD: ICD-10-PCS | Mod: S$PBB,,, | Performed by: INTERNAL MEDICINE

## 2020-03-04 PROCEDURE — 99024 PR POST-OP FOLLOW-UP VISIT: ICD-10-PCS | Mod: ,,, | Performed by: THORACIC SURGERY (CARDIOTHORACIC VASCULAR SURGERY)

## 2020-03-04 PROCEDURE — 99024 POSTOP FOLLOW-UP VISIT: CPT | Mod: ,,, | Performed by: THORACIC SURGERY (CARDIOTHORACIC VASCULAR SURGERY)

## 2020-03-04 PROCEDURE — 71046 X-RAY EXAM CHEST 2 VIEWS: CPT | Mod: TC,FY

## 2020-03-04 PROCEDURE — 99999 PR PBB SHADOW E&M-EST. PATIENT-LVL IV: CPT | Mod: PBBFAC,,, | Performed by: THORACIC SURGERY (CARDIOTHORACIC VASCULAR SURGERY)

## 2020-03-04 PROCEDURE — 71046 XR CHEST PA AND LATERAL: ICD-10-PCS | Mod: 26,,, | Performed by: RADIOLOGY

## 2020-03-04 NOTE — PROGRESS NOTES
Pt in for post-op visit. Pt reminded to continue to wash incision with soap and water and to keep incision clean and dry. Patient instructed to not push, pull or lift anything over 20lbs for an additional 6 weeks. Instructed patient to schedule appointment with Dr. Que Fernando Cardiologist within 2-4 weeks and that a referral message will be sent to his office. Also instructed patient to continue walking as tolerated for exercise. All questions answered. Patient verbalized understanding.

## 2020-03-04 NOTE — PROGRESS NOTES
Patient seen and examined. Patient is progressively increasing activity. No significant complaints.     Sternum: stable, incision CDI  EKG: NSR     Assessment:  S/P CABG     Plan:  Can begin driving   Can begin cardiac rehab 6 weeks after operation.  Discussed benefits of cardiac rehab with patient and family.   We will refer to cardiology to assume care.  Patient does not currently have follow up with Dr. Fernando and was instructed to make a follow up appointment.  Lasix management will be deferred to Nephrology.         No scheduled appointment, RTC prn    I have seen the patient and reviewed the nurse practitioner's note above. I have personally interviewed and examined the patient at bedside and agree with the findings.     Mrs. Connelly is doing well after her single vessel off pump coronary artery bypass surgery on January 14, 2020.  Her postoperative recovery was complicated by renal failure requiring hemodialysis (preop acute kidney injury after heart failure exacerbation, likely prior chronic kidney disease) which is improving.  She is getting stronger day by day and notes mild intermittent dyspnea on exertion.  She also has mild lower extremity edema, which can be improved with more volume removal with dialysis versus lasix.  She can see me in clinic as needed.      Huang Altamirano MD  Cardiothoracic Surgery  [facility]

## 2020-03-04 NOTE — LETTER
[facility] - Cardiovascular Surg  [address]  [facility] [address]  Phone: [phone] March 4, 2020      Que Fernando III, MD  [address]  [facility] [address]    Patient: Suyapa Connelly   MR Number: [id number]   YOB: 1965   Date of Visit: 3/4/2020     Dear Dr. Fernando,     It was a pleasure seeing your patient, Ms. Connelly, in our follow-up clinic today after her single vessel off pump coronary artery bypass surgery on January 14, 2020.  Her postoperative recovery was complicated by renal failure requiring hemodialysis (preop acute kidney injury after heart failure exacerbation, likely prior chronic kidney disease) which is improving.  Her most recent echocardiogram showed mild to moderate left ventricular dysfunction.  She is an active individual who walks daily and has intermittent mild shortness of breath on exertion.     On examination today, her vital signs are normal and EKG is sinus rhythm.   The incision has healed very well and the sternum is stable.  Lungs are clear bilaterally and there is no heart murmur.  There is mild lower extremity edema, for which increased fluid removal during hemodialysis should solve.  She is now discharged from our clinic but please do not hesitate to contact me if there is any problem.     Thank you for your referral and for your trust and confidence in our [facility].  For convenience, attached below is a copy of the operative report.     Kindest regards,    Huang Altamirano MD  Cardiothoracic Surgery  [facility]    SS/afw    [facility]  Cardiothoracic Surgery Operative Report     Patient Name:  Suyapa Connelly; [id number]     Preoperative Diagnosis: Coronary Artery Disease with recent NSTEMI and heart failure     Postoperative Diagnosis:  Same     Date of Operation:  01/14/2020     Operation:  Single vessel off pump coronary artery bypass grafting (OPCAB)  ? Left internal mammary artery to  the distal left anterior descending artery     Surgeon:  Huang Altamirano MD     Assistant Surgeon:  none     Fellow:  Christian Dobson MD     Anesthesiologist:  Carol Chiang MD        ----------------------------------------------------------------------------------------------------------        Indications for surgery: Ms. Connelly is a 54 year-old woman with a history of heart failure reduced ejection fraction (35% ejection fraction) due to ischemic cardiomyopathy, known coronary artery disease s/p MI (2012), peripheral arterial disease s/p stents to bilateral lower extremities (2015), hypertension, and poorly controlled diabetes (HbA1C of 10.3 12/7/2019 and 9.7 on 1/3/2020), who was admitted with NSTEMI (troponin max 0.17) and heart failure exasperation (BNP 1,013), which was complicated by acute kidney injury.  Prior to this episode, she has been symptomatic with dyspnea on exertion (about 1 block) and claudication (at 50 feet).  Her most recent echocardiogram showed 35% ejection fraction, LVEDD of 5.2cm, mild to moderate mitral regurgitation, mild to moderate tricuspid regurgitation, estimated systolic PA pressure of 49mmHg.  Angiogram showed 90% early mid LAD lesion with a moderate sized distal LAD target in a very long wrap around LAD, significant disease in a small first diagonal artery and diffusely diseased second diagonal artery, 70% lesion in mid LCx, and diffusely diseased small RCA with the inferior septum being supplied by the wrap around LAD (minimal PDA noted).  CTA chest showed minimal ascending aortic calcifications and mild aortic arch calcifications.  CTA of the lower extremities showed severe peripheral vascular disease with multiple total occlusion and significant stenoses.     We had an extensive discussion with her regarding her symptoms and disease, particularly focusing on the very poor control of her diabetes in which she has worsening ischemic heart disease and worsening peripheral  vascular disease.  He HbA1C has been improving since seeing Dr. Fernando in early December and this is her second heart failure admission in one month, so we should proceed with surgery, which will be hybrid revascularization with off pump coronary artery bypass surgery (LIMA-LAD) and postoperative percutaneous coronary intervention.  Her significant lower extremity peripheral vascular disease is a concern.  The risk of lower extremity ischemia is high.  Additionally, the risk of sternal wound infection is high due to her poor diabetic control and her risk of recurrent acute kidney injury/worsened kidney function is increased due to preoperative acute kidney injury.  I had an extensive conversation with her and her family about these risks and they agreed to proceed with surgery.     Gross findings: No pericardial adhesions.  Moderate to large sized LAD target.  Mild to moderate LV dysfunction pre anastomosis and improved function post anastomosis.      Procedure:  The patient was brought to the holding area and the indications for surgery were reviewed.  She was placed supine on the operating room table and prepped and draped in the usual sterile fashion. A surgical time out was performed.      A midline incision was followed with division of the sternum. The left internal mammary artery was harvested in a pedicled fashion. ACT guided heparinization was administered for a goal ACT of 400 seconds.       Attention was turned to the distal left anterior descending artery.  The heart was positioned and the LAD was identified.  The off pump surgery stabilizer device was placed on the epicardium and surrounding the vessel. A deep silastic suture was placed around the vessel in a figure of eight fashion proximal to the target anastomotic site.  This silastic suture was then tightened. The vessel was opened and the arteriotomy elongated with scissors.  The LIMA was brought to the field and prepared for use.  The LIMA was  sewn end to side to the LAD with continuous 7-0 prolene. The vessel was briefly unclamped to deair, tied, and then unclamped again to assess for hemostasis.  After ensuring good hemostasis, the vessel remained unclamped and the silastic suture was removed.  Minimal bleeding was noted from the exit sites.     Once appropriate hemostasis was confirmed, heparin was reversed.  Temporary ventricular pacing wires were placed on the heart.  Drainage tubes were placed behind the sternum and in the pleural space. The chest was closed with steel wires and the soft tissue was closed with absorbable suture.  A sterile dressing was applied and the patient was brought to the ICU in stable condition.       I was present in the operating room for the entire operation and immediately available thereafter.

## 2020-03-10 ENCOUNTER — TELEPHONE (OUTPATIENT)
Dept: CARDIOLOGY | Facility: CLINIC | Age: 55
End: 2020-03-10

## 2020-03-10 NOTE — TELEPHONE ENCOUNTER
----- Message from Que Fernando III, MD sent at 3/10/2020 11:02 AM CDT -----  Regarding: FW: Follow-Up Consult  Hey, we need to follow up with this patient and get her into clinic.  She needs a multivessel PCI post cabg.  Thanks.        ----- Message -----  From: Genny Dunn  Sent: 3/6/2020   4:12 PM CDT  To: Que Fernando III, MD  Subject: Follow-Up Consult

## 2020-03-10 NOTE — TELEPHONE ENCOUNTER
I spoke with patient today to schedule her an appt to see Dr Fernando. Offered her an appt for tomorrow but patient has two other appts already scheduled, therefore her appt has been scheduled for Monday 3/16/2020 @9:30.

## 2020-03-16 ENCOUNTER — OFFICE VISIT (OUTPATIENT)
Dept: CARDIOLOGY | Facility: CLINIC | Age: 55
End: 2020-03-16
Payer: MEDICAID

## 2020-03-16 VITALS
BODY MASS INDEX: 32.78 KG/M2 | OXYGEN SATURATION: 98 % | SYSTOLIC BLOOD PRESSURE: 160 MMHG | WEIGHT: 192 LBS | HEIGHT: 64 IN | HEART RATE: 86 BPM | DIASTOLIC BLOOD PRESSURE: 82 MMHG

## 2020-03-16 DIAGNOSIS — I25.10 CORONARY ARTERY DISEASE INVOLVING NATIVE CORONARY ARTERY OF NATIVE HEART WITHOUT ANGINA PECTORIS: ICD-10-CM

## 2020-03-16 DIAGNOSIS — I10 ESSENTIAL HYPERTENSION: ICD-10-CM

## 2020-03-16 DIAGNOSIS — Z95.1 S/P CABG X 1: ICD-10-CM

## 2020-03-16 DIAGNOSIS — E78.2 MIXED HYPERLIPIDEMIA: ICD-10-CM

## 2020-03-16 DIAGNOSIS — E11.9 TYPE 2 DIABETES MELLITUS WITHOUT COMPLICATION, WITH LONG-TERM CURRENT USE OF INSULIN: ICD-10-CM

## 2020-03-16 DIAGNOSIS — N17.9 AKI (ACUTE KIDNEY INJURY): ICD-10-CM

## 2020-03-16 DIAGNOSIS — Z79.4 TYPE 2 DIABETES MELLITUS WITHOUT COMPLICATION, WITH LONG-TERM CURRENT USE OF INSULIN: ICD-10-CM

## 2020-03-16 DIAGNOSIS — I73.9 PAD (PERIPHERAL ARTERY DISEASE): ICD-10-CM

## 2020-03-16 DIAGNOSIS — I50.42 CHRONIC COMBINED SYSTOLIC AND DIASTOLIC HEART FAILURE: ICD-10-CM

## 2020-03-16 DIAGNOSIS — I48.0 PAROXYSMAL ATRIAL FIBRILLATION: ICD-10-CM

## 2020-03-16 DIAGNOSIS — I25.5 ISCHEMIC CARDIOMYOPATHY: ICD-10-CM

## 2020-03-16 PROCEDURE — 99213 OFFICE O/P EST LOW 20 MIN: CPT | Mod: PBBFAC,PN | Performed by: INTERNAL MEDICINE

## 2020-03-16 PROCEDURE — 99214 OFFICE O/P EST MOD 30 MIN: CPT | Mod: S$PBB,,, | Performed by: INTERNAL MEDICINE

## 2020-03-16 PROCEDURE — 99214 PR OFFICE/OUTPT VISIT, EST, LEVL IV, 30-39 MIN: ICD-10-PCS | Mod: S$PBB,,, | Performed by: INTERNAL MEDICINE

## 2020-03-16 PROCEDURE — 99999 PR PBB SHADOW E&M-EST. PATIENT-LVL III: CPT | Mod: PBBFAC,,, | Performed by: INTERNAL MEDICINE

## 2020-03-16 PROCEDURE — 99999 PR PBB SHADOW E&M-EST. PATIENT-LVL III: ICD-10-PCS | Mod: PBBFAC,,, | Performed by: INTERNAL MEDICINE

## 2020-03-16 RX ORDER — CARVEDILOL 6.25 MG/1
6.25 TABLET ORAL NIGHTLY
Status: ON HOLD | COMMUNITY
End: 2021-06-08 | Stop reason: HOSPADM

## 2020-03-16 RX ORDER — TORSEMIDE 20 MG/1
20 TABLET ORAL DAILY
COMMUNITY
Start: 2020-03-10 | End: 2020-09-04 | Stop reason: CLARIF

## 2020-03-16 NOTE — ASSESSMENT & PLAN NOTE
Post op A. Fib, currently on amiodarone.  If no recurrence will d/c amio at next appt and then no AC required.    - check ECG at next appt  - encouraged risk factor and lifestyle modifications

## 2020-03-16 NOTE — PROGRESS NOTES
Subjective:    Patient ID:  Suyapa Connelly is a 54 y.o. female who presents for follow-up of Post-op Evaluation (Hx. CABG 1/14/20.    Poss Multivessel PCI)      PCP: Erlinda Rahman NP     Referring Provider: Varghese Altamirano MD    HPI  Pt is a 53 yo F w/ PMH of HTN, HLD, DM2 w/ HgbA1C 9.7% 1/3/2020, CAD s/p 1v CABG LIMA to LAD 1/14/2020 and residual 75% stenosis of prox OM2, postop A. Fib on amiodarone, ICM w/ recovered LVEF 35-->50% and GIIDD, PAD s/p PTA w/ occluded SFA to Pop stents in her BLE 2015 who presents for f/u post CABG.  She had a prolonged hospitalization 1/2/2020-2/4/2020 following an NSTEMI and had a 1v CABG which was complicated by ROSLYN requiring HD, see d/c sum 2/4/2020 for details oper Genny Ortez.  She states that currently she is on hold from HD and her creatinine is decreasing (last Crt 3.2) and is followed by renal.  She mentions that she has some delgado however denies any angina.  She has been on medical therapy and states that her dyspnea has been improving.  She denies claudication and is awaiting to start cardiac rehab.  She denies cp, orthopnea, PND, palpitations, presyncope, and LOC.  She does not monitor her BP at home but gets it checked at dialysis.  She has been increasing her activity to improve her strength and is awaiting cardiac rehab.        Past Medical History:   Diagnosis Date    Anxiety     Depression     Diabetes mellitus     type 2    GERD (gastroesophageal reflux disease)     Hyperlipemia     Hypertension     Myocardial infarction 2010    minor-caused by stress per pt.     Past Surgical History:   Procedure Laterality Date    Angiogram - Right Extremity Right 7/9/15    angiogram-left leg  10/6/15    CATHETERIZATION OF BOTH LEFT AND RIGHT HEART N/A 12/18/2019    Procedure: CATHETERIZATION, HEART, BOTH LEFT AND RIGHT;  Surgeon: Que Fernando III, MD;  Location: [facility] CATH LAB;  Service: Cardiology;  Laterality: N/A;    CORONARY ANGIOGRAPHY N/A 12/18/2019     Procedure: ANGIOGRAM, CORONARY ARTERY;  Surgeon: Que Fernando III, MD;  Location: [facility] CATH LAB;  Service: Cardiology;  Laterality: N/A;    CORONARY ARTERY BYPASS GRAFT (CABG) N/A 1/14/2020    Procedure: CORONARY ARTERY BYPASS GRAFT (CABG) x 1     Off Pump;  Surgeon: Huang Altamirano MD;  Location: [address];  Service: Cardiovascular;  Laterality: N/A;    INSERTION OF TUNNELED CENTRAL VENOUS HEMODIALYSIS CATHETER N/A 1/27/2020    Procedure: Insertion, Catheter, Central Venous, Hemodialysis;  Surgeon: ESTEBAN Gomez III, MD;  Location: [facility] CATH LAB;  Service: Peripheral Vascular;  Laterality: N/A;     Social History     Socioeconomic History    Marital status: Legally      Spouse name: Not on file    Number of children: Not on file    Years of education: Not on file    Highest education level: Not on file   Occupational History    Not on file   Social Needs    Financial resource strain: Not on file    Food insecurity:     Worry: Not on file     Inability: Not on file    Transportation needs:     Medical: Not on file     Non-medical: Not on file   Tobacco Use    Smoking status: Never Smoker    Smokeless tobacco: Never Used   Substance and Sexual Activity    Alcohol use: No    Drug use: Yes     Types: Marijuana     Comment: used yesterday    Sexual activity: Not on file   Lifestyle    Physical activity:     Days per week: Not on file     Minutes per session: Not on file    Stress: Not on file   Relationships    Social connections:     Talks on phone: Not on file     Gets together: Not on file     Attends Bahai service: Not on file     Active member of club or organization: Not on file     Attends meetings of clubs or organizations: Not on file     Relationship status: Not on file   Other Topics Concern    Not on file   Social History Narrative    Not on file     Family History   Problem Relation Age of Onset    Anesthesia problems Neg Hx        Review of patient's  allergies indicates:   Allergen Reactions    Contrast media      Kidney injury    Pcn [penicillins]      Rash; tolerated ceftriaxone on 1/13/20       Medication List with Changes/Refills   Current Medications    ACETAMINOPHEN (TYLENOL) 500 MG TABLET    Take 1,000 mg by mouth daily as needed for Pain.    AMIODARONE (PACERONE) 400 MG TABLET    Take 1 tablet (400 mg total) by mouth once daily.    ASPIRIN 81 MG CHEW    Take 1 tablet (81 mg total) by mouth once daily.    ATORVASTATIN (LIPITOR) 80 MG TABLET    Take 1 tablet (80 mg total) by mouth once daily.    BLOOD SUGAR DIAGNOSTIC (ONETOUCH ULTRA BLUE TEST STRIP) STRP    Use to test blood glucose twice daily    BLOOD-GLUCOSE METER MISC    Use to test blood glucose    CARVEDILOL (COREG) 6.25 MG TABLET    Take 6.25 mg by mouth once daily. Pt thinks she take BID. Unsure.    CLOPIDOGREL (PLAVIX) 75 MG TABLET    Take 1 tablet (75 mg total) by mouth once daily.    ESCITALOPRAM OXALATE (LEXAPRO) 10 MG TABLET    Take 1 tablet (10 mg total) by mouth once daily.    GABAPENTIN (NEURONTIN) 300 MG CAPSULE    Take 1 capsule (300 mg total) by mouth 3 (three) times daily.    INSULIN ASPART U-100 (NOVOLOG) 100 UNIT/ML INJECTION    Inject 9 Units into the skin 3 (three) times daily with meals.    INSULIN GLARGINE 100 UNITS/ML (3ML) SUBQ PEN    Inject 10 Units into the skin every evening.    LANCETS 30 GAUGE MISC    use to test blood glucose 2 (two) times daily with meals.    MULTIVITAMIN (THERAGRAN) PER TABLET    Take 1 tablet by mouth once daily.    ONDANSETRON (ZOFRAN-ODT) 8 MG TBDL    Dissolve 1 tablet (8 mg total) by mouth every 8 (eight) hours as needed.    SEVELAMER CARBONATE (RENVELA) 800 MG TAB    Take 1 tablet (800 mg total) by mouth 3 (three) times daily with meals.    TORSEMIDE (DEMADEX) 20 MG TAB       Discontinued Medications    METOPROLOL SUCCINATE (TOPROL-XL) 50 MG 24 HR TABLET    Take 1 tablet (50 mg total) by mouth once daily.       Review of Systems   Constitution:  "Negative for diaphoresis and fever.   HENT: Negative for congestion and hearing loss.    Eyes: Negative for blurred vision and pain.   Cardiovascular: Positive for dyspnea on exertion. Negative for chest pain, claudication, leg swelling, near-syncope, palpitations and syncope.   Respiratory: Negative for shortness of breath and sleep disturbances due to breathing.    Hematologic/Lymphatic: Negative for bleeding problem. Does not bruise/bleed easily.   Skin: Negative for color change and poor wound healing.   Gastrointestinal: Negative for abdominal pain and nausea.   Genitourinary: Negative for bladder incontinence and flank pain.   Neurological: Negative for focal weakness and light-headedness.        Objective:   BP (!) 160/82 (BP Location: Left arm, Patient Position: Sitting, BP Method: X-Large (Manual))   Pulse 86   Ht 5' 4" (1.626 m)   Wt 87.1 kg (192 lb)   LMP 09/30/2015 (Exact Date)   SpO2 98%   BMI 32.96 kg/m²    Physical Exam   Constitutional: She is oriented to person, place, and time. She appears well-developed and well-nourished.   HENT:   Head: Normocephalic and atraumatic.   Mouth/Throat: Oropharynx is clear and moist.   Eyes: Pupils are equal, round, and reactive to light. EOM are normal. No scleral icterus.   Neck: Normal range of motion. Neck supple. No JVD present.   Cardiovascular: Normal rate, regular rhythm, S1 normal, S2 normal and intact distal pulses. Exam reveals no gallop and no friction rub.   No murmur heard.  Pulmonary/Chest: Effort normal and breath sounds normal. No respiratory distress. She has no wheezes. She has no rales. She exhibits no tenderness.   Abdominal: Soft. Bowel sounds are normal. She exhibits no distension and no mass. There is no tenderness. There is no rebound.   Musculoskeletal: Normal range of motion. She exhibits no edema or tenderness.   Neurological: She is alert and oriented to person, place, and time. She displays normal reflexes. Coordination normal. "   Skin: Skin is warm and dry. She is not diaphoretic. No pallor.   Psychiatric: She has a normal mood and affect. Her behavior is normal. Judgment normal.         Assessment:       1. Coronary artery disease involving native coronary artery of native heart without angina pectoris    2. S/P CABG x 1    3. Paroxysmal atrial fibrillation    4. PAD (peripheral artery disease)    5. Ischemic cardiomyopathy    6. Chronic combined systolic and diastolic heart failure    7. Essential hypertension    8. Mixed hyperlipidemia    9. ROSLYN (acute kidney injury)    10. Type 2 diabetes mellitus without complication, with long-term current use of insulin         Plan:         Coronary artery disease involving native coronary artery of native heart without angina pectoris  CCS 0.  Pt w/ significant disease of her mid LAD and 75% stenosis of the prox OM2.  She is s/p 1v CABG (LIMA to LAD) 1/14/2020 per Dr. Altamirano and symptoms and LVEF have improved.  Her postop was c/b ROSLYN requiring HD.  - continue aggressive medical therapy for CAD  - cardiac rehab  - will hold on staged PCI of OM given ROSLYN, consider PCI when creatinine stabilized  - encouraged risk factor and lifestyle modifications       S/P CABG x 1  LIMA to LAD 1/14/2020 per Dr. Altamirano.    Paroxysmal atrial fibrillation  Post op A. Fib, currently on amiodarone.  If no recurrence will d/c amio at next appt and then no AC required.    - check ECG at next appt  - encouraged risk factor and lifestyle modifications     PAD (peripheral artery disease)  Pt denies claudication.  Pt found to have b/l stent occlusion of the SFA to Pop.    - continue medical therapy  - cardiac rehab  - encouraged risk factor and lifestyle modifications     Ischemic cardiomyopathy  Compensated.  Stage C, class II.  Pt compliant w/ meds.  S/p CABG.  LVEF recovered from 35-->50%.  - continue medical therapy  - encouraged risk factor and lifestyle modifications       Chronic combined systolic and diastolic  heart failure  Compensated, plan as above.    Essential hypertension  Managed per HD.  Continue medical therapy.  Pt encouraged to monitor BP.      Mixed hyperlipidemia  Goal LDL < 70.  On high intensity statin.  - continue medical therapy  - encouraged risk factor and lifestyle modifications     ROSLYN (acute kidney injury)  Avoid nephrotoxins.  Improving.  Pt currently held from HD and making urine.  Creatinine 3.2.  - stage PCI in future when renal function improves, will continue medical therapy  - continue management per renal      Type 2 diabetes mellitus without complication, with long-term current use of insulin  Uncontrolled.  Goal hgba1c < 7, last 9.7% which is decreasing.    - continue management per diabetologist   - risk factor and lifestyle modifications       Total duration of face to face visit time 30 minutes.  Total time spent counseling greater than fifty percent of total visit time.  Counseling included discussion regarding imaging findings, diagnosis, possibilities, treatment options, risks and benefits.  The patient had many questions regarding the options and long-term effects      Que Fernando M.D.[id number]  Interventional Cardiology

## 2020-03-16 NOTE — ASSESSMENT & PLAN NOTE
Avoid nephrotoxins.  Improving.  Pt currently held from HD and making urine.  Creatinine 3.2.  - stage PCI in future when renal function improves, will continue medical therapy  - continue management per renal

## 2020-03-16 NOTE — ASSESSMENT & PLAN NOTE
Compensated.  Stage C, class II.  Pt compliant w/ meds.  S/p CABG.  LVEF recovered from 35-->50%.  - continue medical therapy  - encouraged risk factor and lifestyle modifications

## 2020-03-16 NOTE — ASSESSMENT & PLAN NOTE
Uncontrolled.  Goal hgba1c < 7, last 9.7% which is decreasing.    - continue management per diabetologist   - risk factor and lifestyle modifications

## 2020-03-16 NOTE — ASSESSMENT & PLAN NOTE
CCS 0.  Pt w/ significant disease of her mid LAD and 75% stenosis of the prox OM2.  She is s/p 1v CABG (LIMA to LAD) 1/14/2020 per Dr. Altamirano and symptoms and LVEF have improved.  Her postop was c/b ROSLYN requiring HD.  - continue aggressive medical therapy for CAD  - cardiac rehab  - will hold on staged PCI of OM given ROSLYN, consider PCI when creatinine stabilized  - encouraged risk factor and lifestyle modifications

## 2020-03-16 NOTE — ASSESSMENT & PLAN NOTE
Goal LDL < 70.  On high intensity statin.  - continue medical therapy  - encouraged risk factor and lifestyle modifications

## 2020-03-16 NOTE — ASSESSMENT & PLAN NOTE
Pt denies claudication.  Pt found to have b/l stent occlusion of the SFA to Pop.    - continue medical therapy  - cardiac rehab  - encouraged risk factor and lifestyle modifications

## 2020-03-17 ENCOUNTER — TELEPHONE (OUTPATIENT)
Dept: CARDIOTHORACIC SURGERY | Facility: CLINIC | Age: 55
End: 2020-03-17

## 2020-03-17 ENCOUNTER — PATIENT MESSAGE (OUTPATIENT)
Dept: VASCULAR SURGERY | Facility: CLINIC | Age: 55
End: 2020-03-17

## 2020-03-17 DIAGNOSIS — Z01.818 PREOPERATIVE TESTING: Primary | ICD-10-CM

## 2020-03-17 NOTE — TELEPHONE ENCOUNTER
----- Message from Kylah José MA sent at 3/17/2020 10:00 AM CDT -----  Good morning,    We received a referral from Dr.Miguel Delgadillo for this patient to be seen for remove tunneled catheter. I have scanned the referral and records into . The pt is s/p CABG procedure at [facility]. Please give the patient a call to schedule.    Thank you   Kylah Goldberg

## 2020-03-23 ENCOUNTER — NURSE TRIAGE (OUTPATIENT)
Dept: ADMINISTRATIVE | Facility: CLINIC | Age: 55
End: 2020-03-23

## 2020-03-23 ENCOUNTER — HOSPITAL ENCOUNTER (EMERGENCY)
Facility: HOSPITAL | Age: 55
Discharge: HOME | End: 2020-03-24
Attending: EMERGENCY MEDICINE
Payer: MEDICAID

## 2020-03-23 DIAGNOSIS — Z20.822 SUSPECTED COVID-19 VIRUS INFECTION: ICD-10-CM

## 2020-03-23 DIAGNOSIS — R53.1 GENERALIZED WEAKNESS: ICD-10-CM

## 2020-03-23 DIAGNOSIS — B34.9 VIRAL SYNDROME: Primary | ICD-10-CM

## 2020-03-23 LAB
ALBUMIN SERPL BCP-MCNC: 3.7 G/DL (ref 3.5–5.2)
ALP SERPL-CCNC: 62 U/L (ref 55–135)
ALT SERPL W/O P-5'-P-CCNC: 16 U/L (ref 10–44)
ANION GAP SERPL CALC-SCNC: 12 MMOL/L (ref 8–16)
AST SERPL-CCNC: 23 U/L (ref 10–40)
BASOPHILS # BLD AUTO: 0.03 K/UL (ref 0–0.2)
BASOPHILS NFR BLD: 0.5 % (ref 0–1.9)
BILIRUB SERPL-MCNC: 0.4 MG/DL (ref 0.1–1)
BUN SERPL-MCNC: 20 MG/DL (ref 6–20)
CALCIUM SERPL-MCNC: 8.9 MG/DL (ref 8.7–10.5)
CHLORIDE SERPL-SCNC: 99 MMOL/L (ref 95–110)
CO2 SERPL-SCNC: 23 MMOL/L (ref 23–29)
CREAT SERPL-MCNC: 1.4 MG/DL (ref 0.5–1.4)
CTP QC/QA: YES
DIFFERENTIAL METHOD: ABNORMAL
EOSINOPHIL # BLD AUTO: 0 K/UL (ref 0–0.5)
EOSINOPHIL NFR BLD: 0.2 % (ref 0–8)
ERYTHROCYTE [DISTWIDTH] IN BLOOD BY AUTOMATED COUNT: 15.7 % (ref 11.5–14.5)
EST. GFR  (AFRICAN AMERICAN): 49.5 ML/MIN/1.73 M^2
EST. GFR  (NON AFRICAN AMERICAN): 42.9 ML/MIN/1.73 M^2
GLUCOSE SERPL-MCNC: 86 MG/DL (ref 70–110)
HCT VFR BLD AUTO: 39.6 % (ref 37–48.5)
HGB BLD-MCNC: 12.5 G/DL (ref 12–16)
IMM GRANULOCYTES # BLD AUTO: 0.01 K/UL (ref 0–0.04)
IMM GRANULOCYTES NFR BLD AUTO: 0.2 % (ref 0–0.5)
LIPASE SERPL-CCNC: 18 U/L (ref 4–60)
LYMPHOCYTES # BLD AUTO: 1.4 K/UL (ref 1–4.8)
LYMPHOCYTES NFR BLD: 21.7 % (ref 18–48)
MAGNESIUM SERPL-MCNC: 1.8 MG/DL (ref 1.6–2.6)
MCH RBC QN AUTO: 30.1 PG (ref 27–31)
MCHC RBC AUTO-ENTMCNC: 31.6 G/DL (ref 32–36)
MCV RBC AUTO: 95 FL (ref 82–98)
MONOCYTES # BLD AUTO: 0.6 K/UL (ref 0.3–1)
MONOCYTES NFR BLD: 9.4 % (ref 4–15)
NEUTROPHILS # BLD AUTO: 4.4 K/UL (ref 1.8–7.7)
NEUTROPHILS NFR BLD: 68 % (ref 38–73)
NRBC BLD-RTO: 0 /100 WBC
PLATELET # BLD AUTO: 278 K/UL (ref 150–350)
PMV BLD AUTO: 12.1 FL (ref 9.2–12.9)
POC MOLECULAR INFLUENZA A AGN: NEGATIVE
POC MOLECULAR INFLUENZA B AGN: NEGATIVE
POTASSIUM SERPL-SCNC: 4.5 MMOL/L (ref 3.5–5.1)
PROT SERPL-MCNC: 7.2 G/DL (ref 6–8.4)
RBC # BLD AUTO: 4.15 M/UL (ref 4–5.4)
SODIUM SERPL-SCNC: 134 MMOL/L (ref 136–145)
WBC # BLD AUTO: 6.51 K/UL (ref 3.9–12.7)

## 2020-03-23 PROCEDURE — 99284 EMERGENCY DEPT VISIT MOD MDM: CPT | Mod: 25

## 2020-03-23 PROCEDURE — 85025 COMPLETE CBC W/AUTO DIFF WBC: CPT

## 2020-03-23 PROCEDURE — 83735 ASSAY OF MAGNESIUM: CPT

## 2020-03-23 PROCEDURE — 99284 PR EMERGENCY DEPT VISIT,LEVEL IV: ICD-10-PCS | Mod: ,,, | Performed by: PHYSICIAN ASSISTANT

## 2020-03-23 PROCEDURE — 83690 ASSAY OF LIPASE: CPT

## 2020-03-23 PROCEDURE — 87502 INFLUENZA DNA AMP PROBE: CPT

## 2020-03-23 PROCEDURE — 63600175 PHARM REV CODE 636 W HCPCS: Performed by: PHYSICIAN ASSISTANT

## 2020-03-23 PROCEDURE — 80053 COMPREHEN METABOLIC PANEL: CPT

## 2020-03-23 PROCEDURE — 96361 HYDRATE IV INFUSION ADD-ON: CPT

## 2020-03-23 PROCEDURE — 99284 EMERGENCY DEPT VISIT MOD MDM: CPT | Mod: ,,, | Performed by: PHYSICIAN ASSISTANT

## 2020-03-23 PROCEDURE — 96360 HYDRATION IV INFUSION INIT: CPT

## 2020-03-23 RX ADMIN — SODIUM CHLORIDE 500 ML: 0.9 INJECTION, SOLUTION INTRAVENOUS at 09:03

## 2020-03-23 NOTE — TELEPHONE ENCOUNTER
"Suyapa Connelly is a 54 year old lady S/P CABG who was hospitalized from January 3rd-February 8th after complications which required dialysis. Since discharge, she has been immobilized at home.     On 3/19 patient started feeling spontaneous onset 8/10 epigastric abdominal pain unrelated to food or activity, with generalized weakness, nausea (an episode of vomiting), and nonradiating chest pain she attributes to her sternum where her surgery scars are.   She admits having mild rhinorrhea.    Denies Fevers, chills, night sweats, Cough, or Shortness of breath, or sore throat.    Denies Sick contacts. Denies Contact with COVID-19 patients.      Reason for Disposition   Pain is mainly in upper abdomen  (if needed ask: "is it mainly above the belly button?")   Chest pain   Recent illness requiring prolonged bedrest (i.e., immobilization)   History of prior "blood clot" in leg or lungs (i.e., deep vein thrombosis, pulmonary embolism)   Major surgery in the past month     CABG Surgery January 14th   [1] Chest pain lasting <= 5 minutes AND [2] NO chest pain or cardiac symptoms now(Exceptions: pains lasting a few seconds)    Additional Information   Negative: Shock suspected (e.g., cold/pale/clammy skin, too weak to stand, low BP, rapid pulse)   Negative: Difficult to awaken or acting confused (e.g., disoriented, slurred speech)   Negative: Passed out (i.e., lost consciousness, collapsed and was not responding)   Negative: Sounds like a life-threatening emergency to the triager   Negative: Chest pain   Negative: Severe difficulty breathing (e.g., struggling for each breath, speaks in single words)   Negative: Shock suspected (e.g., cold/pale/clammy skin, too weak to stand, low BP, rapid pulse)   Negative: Difficult to awaken or acting confused (e.g., disoriented, slurred speech)   Negative: Passed out (i.e., lost consciousness, collapsed and was not responding)   Negative: Visible sweat on face or sweat " "dripping down face   Negative: Sounds like a life-threatening emergency to the triager   Negative: Followed an abdomen (stomach) injury   Negative: Severe difficulty breathing (e.g., struggling for each breath, speaks in single words)   Negative: Difficult to awaken or acting confused (e.g., disoriented, slurred speech)   Negative: Shock suspected (e.g., cold/pale/clammy skin, too weak to stand, low BP, rapid pulse)   Negative: [1] Chest pain lasts > 5 minutes AND [2] history of heart disease  (i.e., heart attack, bypass surgery, angina, angioplasty, CHF; not just a heart murmur)   Negative: [1] Chest pain lasts > 5 minutes AND [2] described as crushing, pressure-like, or heavy   Negative: [1] Chest pain lasts > 5 minutes AND [2] age > 50   Negative: [1] Chest pain lasts > 5 minutes AND [2] age > 30 AND [3] at least one cardiac risk factor (i.e., hypertension, diabetes, obesity, smoker or strong family history of heart disease)   Negative: [1] Chest pain lasts > 5 minutes AND [2] not relieved with nitroglycerin   Negative: Passed out (i.e., lost consciousness, collapsed and was not responding)   Negative: Heart beating < 50 beats per minute OR > 140 beats per minute   Negative: Visible sweat on face or sweat dripping down face   Negative: Sounds like a life-threatening emergency to the triager   Negative: Followed a chest injury   Negative: SEVERE chest pain   Negative: [1] Intermittent  chest pain or "angina" AND [2] increasing in severity or frequency  (Exception: pains lasting a few seconds)   Negative: Pain also present in shoulder(s) or arm(s) or jaw  (Exception: pain is clearly made worse by movement)   Negative: Difficulty breathing   Negative: Dizziness or lightheadedness   Negative: Coughing up blood   Negative: Cocaine use within last 3 days   Negative: Recent long-distance travel with prolonged time in car, bus, plane, or train (i.e., within past 2 weeks; 6 or  more hours " "duration)   Negative: Hip or leg fracture in past 2 months (e.g., had cast on leg or ankle)   Negative: Chest pain lasts > 5 minutes (Exceptions: chest pain occurring > 3 days ago and now asymptomatic; same as previously diagnosed heartburn and has accompanying sour taste in mouth)   Negative: Taking a deep breath makes pain worse   Negative: Patient sounds very sick or weak to the triager   Negative: [1] Chest pain lasts > 5 minutes AND [2] occurred > 3 days ago (72 hours) AND [3] NO chest pain or cardiac symptoms now   Negative: Fever > 100.5 F (38.1 C)   Negative: Rash in same area as pain (may be described as "small blisters")   Negative: [1] Patient claims chest pain is same as previously diagnosed "heartburn" AND [2] describes burning in chest AND [3] accompanying sour taste in mouth   Negative: [1] Chest pain lasting <= 5 minutes AND [2] has not taken prescribed nitroglycerin   Negative: [1] Chest pain lasting <= 5 minutes AND [2] completely relieved by nitroglycerin   Negative: [1] Intermittent chest pain from "angina" AND [2] NO increase in severity or frequency   Negative: Chest pain(s) lasting a few seconds from coughing AND [2] persists > 3 days   Negative: [1] Chest pain(s) lasting a few seconds AND [2] persists > 3 days   Negative: Chest pain(s) lasting a few seconds   Negative: Chest pain(s) lasting a few seconds from coughing    Protocols used: ABDOMINAL PAIN - FEMALE-A-AH, ABDOMINAL PAIN - UPPER-A-AH, CHEST PAIN-A-AH      "

## 2020-03-24 VITALS
DIASTOLIC BLOOD PRESSURE: 64 MMHG | HEART RATE: 97 BPM | RESPIRATION RATE: 18 BRPM | OXYGEN SATURATION: 98 % | SYSTOLIC BLOOD PRESSURE: 120 MMHG | TEMPERATURE: 100 F

## 2020-03-24 RX ORDER — DICYCLOMINE HYDROCHLORIDE 20 MG/1
20 TABLET ORAL 2 TIMES DAILY
Qty: 10 TABLET | Refills: 0 | Status: SHIPPED | OUTPATIENT
Start: 2020-03-24 | End: 2020-03-29

## 2020-03-24 RX ORDER — ONDANSETRON 4 MG/1
4 TABLET, ORALLY DISINTEGRATING ORAL EVERY 12 HOURS PRN
Qty: 10 TABLET | Refills: 0 | Status: ON HOLD | OUTPATIENT
Start: 2020-03-24 | End: 2021-06-08 | Stop reason: HOSPADM

## 2020-03-24 NOTE — ED PROVIDER NOTES
Encounter Date: 3/23/2020       History     Chief Complaint   Patient presents with    Nausea     pt reports multiple complaints of weakness, n/v/d x 4vdays ago. Hx of recent CABG. Denies SOB.    Vomiting    Diarrhea     55 y/o female with DM, HTN, MI with CABG 2 months ago c/o generalized weakness, nausea, and diarrhea. She reports associated mild cough, but denies SOB, fever, or CP. She presents with 2 other family members with similar symptoms.         Review of patient's allergies indicates:   Allergen Reactions    Contrast media      Kidney injury    Pcn [penicillins]      Rash; tolerated ceftriaxone on 1/13/20     Past Medical History:   Diagnosis Date    Anxiety     Depression     Diabetes mellitus     type 2    GERD (gastroesophageal reflux disease)     Hyperlipemia     Hypertension     Myocardial infarction 2010    minor-caused by stress per pt.     Past Surgical History:   Procedure Laterality Date    Angiogram - Right Extremity Right 7/9/15    angiogram-left leg  10/6/15    CATHETERIZATION OF BOTH LEFT AND RIGHT HEART N/A 12/18/2019    Procedure: CATHETERIZATION, HEART, BOTH LEFT AND RIGHT;  Surgeon: Que Fernando III, MD;  Location: [facility] CATH LAB;  Service: Cardiology;  Laterality: N/A;    CORONARY ANGIOGRAPHY N/A 12/18/2019    Procedure: ANGIOGRAM, CORONARY ARTERY;  Surgeon: Que Fernando III, MD;  Location: [facility] CATH LAB;  Service: Cardiology;  Laterality: N/A;    CORONARY ARTERY BYPASS GRAFT (CABG) N/A 1/14/2020    Procedure: CORONARY ARTERY BYPASS GRAFT (CABG) x 1     Off Pump;  Surgeon: Huang Altamirano MD;  Location: [facility] OR [address] [place] [address];  Service: Cardiovascular;  Laterality: N/A;    INSERTION OF TUNNELED CENTRAL VENOUS HEMODIALYSIS CATHETER N/A 1/27/2020    Procedure: Insertion, Catheter, Central Venous, Hemodialysis;  Surgeon: ESTEBAN Gomez III, MD;  Location: [facility] CATH LAB;  Service: Peripheral Vascular;  Laterality: N/A;     Family History   Problem Relation Age of  Onset    Anesthesia problems Neg Hx      Social History     Tobacco Use    Smoking status: Never Smoker    Smokeless tobacco: Never Used   Substance Use Topics    Alcohol use: No    Drug use: Yes     Types: Marijuana     Comment: used yesterday     Review of Systems   Constitutional: Positive for fatigue. Negative for fever.   HENT: Negative for sore throat.    Respiratory: Positive for cough. Negative for shortness of breath.    Cardiovascular: Negative for chest pain.   Gastrointestinal: Positive for abdominal pain, diarrhea, nausea and vomiting.   Genitourinary: Negative for dysuria.   Musculoskeletal: Negative for back pain.   Skin: Negative for rash.   Neurological: Positive for weakness (Generalized). Negative for dizziness, numbness and headaches.   Hematological: Does not bruise/bleed easily.       Physical Exam     Initial Vitals   BP Pulse Resp Temp SpO2   03/23/20 1935 03/23/20 1921 03/23/20 1935 03/23/20 1921 03/23/20 1921   114/63 (!) 116 16 99.6 °F (37.6 °C) 95 %      MAP       --                Physical Exam    Vitals reviewed.  Constitutional: She appears well-developed and well-nourished. She is not diaphoretic. No distress.   HENT:   Head: Normocephalic and atraumatic.   Right Ear: External ear normal.   Left Ear: External ear normal.   Nose: Nose normal.   Eyes: Conjunctivae are normal. No scleral icterus.   Neck: Normal range of motion. Neck supple. No JVD present.   Cardiovascular: Regular rhythm, normal heart sounds and intact distal pulses. Tachycardia present.    Pulmonary/Chest: Breath sounds normal. No respiratory distress. She has no wheezes. She has no rhonchi. She has no rales. She exhibits no tenderness.   Abdominal: Soft. She exhibits no distension and no mass. There is no tenderness. There is no rebound and no guarding.   Musculoskeletal: Normal range of motion.   Lymphadenopathy:     She has no cervical adenopathy.   Neurological: She is alert and oriented to person, place, and  "time.   Skin: Skin is warm and dry.         ED Course   Procedures  Labs Reviewed   CBC W/ AUTO DIFFERENTIAL - Abnormal; Notable for the following components:       Result Value    Mean Corpuscular Hemoglobin Conc 31.6 (*)     RDW 15.7 (*)     All other components within normal limits   COMPREHENSIVE METABOLIC PANEL - Abnormal; Notable for the following components:    Sodium 134 (*)     eGFR if  49.5 (*)     eGFR if non  42.9 (*)     All other components within normal limits   MAGNESIUM   LIPASE   POCT INFLUENZA A/B MOLECULAR     EKG Readings: (Independently Interpreted)   Initial Reading: No STEMI. Rhythm: Normal Sinus Rhythm. Ectopy: No Ectopy. Axis: Normal.       Imaging Results          X-Ray Chest AP Portable (Final result)  Result time 03/23/20 21:00:19    Final result by Lino Peter MD (03/23/20 21:00:19)                 Impression:      No detrimental change when compared with 03/04/2020.      Electronically signed by: Lino Peter MD  Date:    03/23/2020  Time:    21:00             Narrative:    EXAMINATION:  XR CHEST AP PORTABLE    CLINICAL HISTORY:  Provided history is "Chest Pain;  ".    TECHNIQUE:  One view of the chest.    COMPARISON:  03/04/2020.    FINDINGS:  Cardiomediastinal silhouette is mildly enlarged but stable.  There are postoperative changes of median sternotomy.  Atherosclerotic calcifications overlie the aortic arch.  Left-sided central venous catheter is present with the tip overlying the upper SVC.  Platelike subsegmental opacities in the lower lung zones, similar to prior study dated 03/04/2020.  No new focal consolidation.  No sizable pleural effusion.  No pneumothorax.                              X-Rays:   Independently Interpreted Readings:   Chest X-Ray: Normal heart size.  No infiltrates.  No acute abnormalities.     Medical Decision Making:   ED Management:  54-year-old female with generalized weakness, nausea, vomiting, and diarrhea.  " Mild cough.  Symptomatology concerning for viral syndrome.  No indication for corona virus testing at this time, as she is afebrile.  Abdominal exam benign.  EKG and chest x-ray unrevealing.  Labs without significant abnormality.  Patient unable to provide a clean urine specimen due to diarrhea.  She has no urinary symptoms.  Low suspicion for UTI.  I did not think catheter specimen is warranted at this time.  Patient given small fluid bolus.  She is tolerating p.o..  Safe for discharge with continued p.o. hydration, and close follow-up with PCP.  Strict return precautions given.  Case discussed with ED attending Dr. Cedillo                                 Clinical Impression:       ICD-10-CM ICD-9-CM   1. Viral syndrome B34.9 079.99   2. Generalized weakness R53.1 780.79   3. Suspected Covid-19 Virus Infection R68.89              ED Disposition Condition    Discharge Stable        ED Prescriptions     Medication Sig Dispense Start Date End Date Auth. Provider    dicyclomine (BENTYL) 20 mg tablet Take 1 tablet (20 mg total) by mouth 2 (two) times daily. for 5 days 10 tablet 3/24/2020 3/29/2020 Aj Sams PA-C    ondansetron (ZOFRAN-ODT) 4 MG TbDL Take 1 tablet (4 mg total) by mouth every 12 (twelve) hours as needed (vomiting). 10 tablet 3/24/2020  Aj Sams PA-C        Follow-up Information     Follow up With Specialties Details Why Contact Info    Erlinda Rahman, NP Family Medicine Schedule an appointment as soon as possible for a visit  For follow-up care [id number] [facility] [id number]  [phone]      [facility] Emergency Medicine Go to  If symptoms worsen [address] [facility] [id number] [phone]                                     Aj Sams PA-C  03/24/20 0140

## 2020-03-26 ENCOUNTER — TELEPHONE (OUTPATIENT)
Dept: VASCULAR SURGERY | Facility: CLINIC | Age: 55
End: 2020-03-26

## 2020-03-26 NOTE — TELEPHONE ENCOUNTER
Contacted patient to reschedule appt due to suspected COVID-19. Pt states she was tested three days ago and hasn't gotten her results yet. States she is worried that if she waits eight weeks to have permcath removed she will develop an infection. Pt states she had CABG earlier this year, was in a coma for several days, and does not want any other setbacks. She has not dialyzed in two weeks and dressing was last changed two weeks ago. Will discuss with Dr. Gomez and will contact patient with response. ----- Message from ESTEBAN Gomez III, MD sent at 3/25/2020  1:27 PM CDT -----  Suspected COVID 19.  Reschedule 8 weeks

## 2020-03-27 ENCOUNTER — TELEPHONE (OUTPATIENT)
Dept: VASCULAR SURGERY | Facility: CLINIC | Age: 55
End: 2020-03-27

## 2020-03-27 NOTE — TELEPHONE ENCOUNTER
Contacted patient to verify if results of COVID-19 were received. Pt states she never has received results and does not think she was actually tested for COVID-19. Nurse unable to find any COVID-19 report in patient's chart or evidence of being tested in chart. Pt states she was only suspected because she was having diarrhea. Denies all other S/S and states she is no longer having diarrhea. Will discuss with Dr. Gomez whether or not he feels it is safe for patient to report to clinic on 03/31/2020 for permcath removal and will contact patient with response.----- Message from ESTEBAN Gomez III, MD sent at 3/25/2020  1:27 PM CDT -----  Suspected COVID 19.  Reschedule 8 weeks

## 2020-03-30 ENCOUNTER — TELEPHONE (OUTPATIENT)
Dept: VASCULAR SURGERY | Facility: CLINIC | Age: 55
End: 2020-03-30

## 2020-03-30 NOTE — TELEPHONE ENCOUNTER
Attempted to contact patient to confirm appt for tomorrow 03/31/2020 for permcath removal with Dr. Gomez. Unable to leave voice messages on contact numbers listed for patient. Will reattempt. ----- Message from ESTEBAN Gomez III, MD sent at 3/28/2020  7:26 AM CDT -----  OK, for permacth removal  ----- Message -----  From: Anna iXong RN  Sent: 3/27/2020   3:55 PM CDT  To: ESTEBAN Gomez III, MD    Ok so I looked in her chart to see if her COVID-19 test had resulted but couldn't find anything. When I talked to her she says she doesn't think she was actually tested for COVID-19 because the only symptom she had was diarrhea. So she has not been tested and denies any symptoms at all. Her ED visit note does say suspected for COVID-19 though. Are you comfortable having her come in on Tuesday still being she was never actually tested? Seems like it would be ok.    Anna      ----- Message -----  From: ESTEBAN Gomez III, MD  Sent: 3/25/2020   1:27 PM CDT  To: Anna Xiong RN    Suspected COVID 19.  Reschedule 8 weeks

## 2020-03-30 NOTE — TELEPHONE ENCOUNTER
Contacted patient to confirm appt with Dr. Gomez for permcath removal. Notified patient Dr. Gomez states it's fine for her to come to clinic. Rescheduled appt to earlier time in day, pt verified.

## 2020-03-31 ENCOUNTER — LAB VISIT (OUTPATIENT)
Dept: LAB | Facility: HOSPITAL | Age: 55
End: 2020-03-31
Attending: SURGERY
Payer: MEDICAID

## 2020-03-31 ENCOUNTER — OFFICE VISIT (OUTPATIENT)
Dept: VASCULAR SURGERY | Facility: CLINIC | Age: 55
End: 2020-03-31
Payer: MEDICAID

## 2020-03-31 VITALS
BODY MASS INDEX: 28.7 KG/M2 | SYSTOLIC BLOOD PRESSURE: 145 MMHG | DIASTOLIC BLOOD PRESSURE: 78 MMHG | HEIGHT: 66 IN | TEMPERATURE: 98 F | HEART RATE: 104 BPM | WEIGHT: 178.56 LBS

## 2020-03-31 DIAGNOSIS — N17.9 AKI (ACUTE KIDNEY INJURY): Primary | ICD-10-CM

## 2020-03-31 DIAGNOSIS — Z01.818 PREOPERATIVE TESTING: ICD-10-CM

## 2020-03-31 LAB
ANION GAP SERPL CALC-SCNC: 9 MMOL/L (ref 8–16)
BUN SERPL-MCNC: 26 MG/DL (ref 6–20)
CALCIUM SERPL-MCNC: 8.7 MG/DL (ref 8.7–10.5)
CHLORIDE SERPL-SCNC: 105 MMOL/L (ref 95–110)
CO2 SERPL-SCNC: 22 MMOL/L (ref 23–29)
CREAT SERPL-MCNC: 1.9 MG/DL (ref 0.5–1.4)
EST. GFR  (AFRICAN AMERICAN): 34.2 ML/MIN/1.73 M^2
EST. GFR  (NON AFRICAN AMERICAN): 29.7 ML/MIN/1.73 M^2
GLUCOSE SERPL-MCNC: 199 MG/DL (ref 70–110)
POTASSIUM SERPL-SCNC: 3.5 MMOL/L (ref 3.5–5.1)
SODIUM SERPL-SCNC: 136 MMOL/L (ref 136–145)

## 2020-03-31 PROCEDURE — 36589 REMOVAL TUNNELED CV CATH: CPT | Mod: S$PBB,58,, | Performed by: SURGERY

## 2020-03-31 PROCEDURE — 99999 PR PBB SHADOW E&M-EST. PATIENT-LVL III: CPT | Mod: PBBFAC,,, | Performed by: SURGERY

## 2020-03-31 PROCEDURE — 36589 REMOVAL TUNNELED CV CATH: CPT | Mod: PBBFAC | Performed by: SURGERY

## 2020-03-31 PROCEDURE — 99213 OFFICE O/P EST LOW 20 MIN: CPT | Mod: PBBFAC,25 | Performed by: SURGERY

## 2020-03-31 PROCEDURE — 80048 BASIC METABOLIC PNL TOTAL CA: CPT

## 2020-03-31 PROCEDURE — 36415 COLL VENOUS BLD VENIPUNCTURE: CPT

## 2020-03-31 PROCEDURE — 36589 PR REMOVAL TUNNELED CV CATH W/O SUBQ PORT OR PUMP: ICD-10-PCS | Mod: S$PBB,58,, | Performed by: SURGERY

## 2020-03-31 PROCEDURE — 99999 PR PBB SHADOW E&M-EST. PATIENT-LVL III: ICD-10-PCS | Mod: PBBFAC,,, | Performed by: SURGERY

## 2020-03-31 NOTE — PROGRESS NOTES
Subjective:       Patient ID: Suyapa Connelly is a 54 y.o. female.    Chief Complaint: Follow-up    55 y/o s/p CABG 1/2020 with post op ARF needed HD, L permacath placed 1/27/2020, now off HD 3 weeks    Past Medical History:   Diagnosis Date    Anxiety     Depression     Diabetes mellitus     type 2    GERD (gastroesophageal reflux disease)     Hyperlipemia     Hypertension     Myocardial infarction 2010    minor-caused by stress per pt.     Past Surgical History:   Procedure Laterality Date    Angiogram - Right Extremity Right 7/9/15    angiogram-left leg  10/6/15    CATHETERIZATION OF BOTH LEFT AND RIGHT HEART N/A 12/18/2019    Procedure: CATHETERIZATION, HEART, BOTH LEFT AND RIGHT;  Surgeon: Que Fernando III, MD;  Location: [facility] CATH LAB;  Service: Cardiology;  Laterality: N/A;    CORONARY ANGIOGRAPHY N/A 12/18/2019    Procedure: ANGIOGRAM, CORONARY ARTERY;  Surgeon: Que Fernando III, MD;  Location: [facility] CATH LAB;  Service: Cardiology;  Laterality: N/A;    CORONARY ARTERY BYPASS GRAFT (CABG) N/A 1/14/2020    Procedure: CORONARY ARTERY BYPASS GRAFT (CABG) x 1     Off Pump;  Surgeon: Huang Altamirano MD;  Location: [facility] OR [address] [place] [address];  Service: Cardiovascular;  Laterality: N/A;    INSERTION OF TUNNELED CENTRAL VENOUS HEMODIALYSIS CATHETER N/A 1/27/2020    Procedure: Insertion, Catheter, Central Venous, Hemodialysis;  Surgeon: ESTEBAN Gomez III, MD;  Location: [facility] CATH LAB;  Service: Peripheral Vascular;  Laterality: N/A;     Family History   Problem Relation Age of Onset    Anesthesia problems Neg Hx      Social History     Socioeconomic History    Marital status: Legally      Spouse name: Not on file    Number of children: Not on file    Years of education: Not on file    Highest education level: Not on file   Occupational History    Not on file   Social Needs    Financial resource strain: Not on file    Food insecurity:     Worry: Not on file     Inability: Not on file     Transportation needs:     Medical: Not on file     Non-medical: Not on file   Tobacco Use    Smoking status: Never Smoker    Smokeless tobacco: Never Used   Substance and Sexual Activity    Alcohol use: No    Drug use: Yes     Types: Marijuana     Comment: used yesterday    Sexual activity: Not on file   Lifestyle    Physical activity:     Days per week: Not on file     Minutes per session: Not on file    Stress: Not on file   Relationships    Social connections:     Talks on phone: Not on file     Gets together: Not on file     Attends [employer] service: Not on file     Active member of club or organization: Not on file     Attends meetings of clubs or organizations: Not on file     Relationship status: Not on file   Other Topics Concern    Not on file   Social History Narrative    Not on file       Current Outpatient Medications   Medication Sig Dispense Refill    acetaminophen (TYLENOL) 500 MG tablet Take 1,000 mg by mouth daily as needed for Pain.      amiodarone (PACERONE) 400 MG tablet Take 1 tablet (400 mg total) by mouth once daily. 30 tablet 11    aspirin 81 MG Chew Take 1 tablet (81 mg total) by mouth once daily. 90 tablet 3    blood sugar diagnostic (ONETOUCH ULTRA BLUE TEST STRIP) Strp Use to test blood glucose twice daily (Patient taking differently: Tests 3 times daily) 100 each 11    blood-glucose meter Misc Use to test blood glucose 1 each 0    carvediloL (COREG) 6.25 MG tablet Take 6.25 mg by mouth once daily. Pt thinks she take BID. Unsure.      clopidogreL (PLAVIX) 75 mg tablet Take 1 tablet (75 mg total) by mouth once daily. 30 tablet 11    insulin glargine 100 units/mL (3mL) SubQ pen Inject 10 Units into the skin every evening. (Patient taking differently: Inject 10 Units into the skin every evening. Lantus) 9 mL 1    lancets 30 gauge Misc use to test blood glucose 2 (two) times daily with meals. 100 each 11    multivitamin (THERAGRAN) per tablet Take 1 tablet by mouth  "once daily.      ondansetron (ZOFRAN-ODT) 4 MG TbDL Take 1 tablet (4 mg total) by mouth every 12 (twelve) hours as needed (vomiting). 10 tablet 0    sevelamer carbonate (RENVELA) 800 mg Tab Take 1 tablet (800 mg total) by mouth 3 (three) times daily with meals. 90 tablet 11    torsemide (DEMADEX) 20 MG Tab       atorvastatin (LIPITOR) 80 MG tablet Take 1 tablet (80 mg total) by mouth once daily. 90 tablet 3    escitalopram oxalate (LEXAPRO) 10 MG tablet Take 1 tablet (10 mg total) by mouth once daily. 30 tablet 0    gabapentin (NEURONTIN) 300 MG capsule Take 1 capsule (300 mg total) by mouth 3 (three) times daily. 90 capsule 5    insulin aspart U-100 (NOVOLOG) 100 unit/mL injection Inject 9 Units into the skin 3 (three) times daily with meals. 10 mL 0     No current facility-administered medications for this visit.      Review of patient's allergies indicates:   Allergen Reactions    Contrast media      Kidney injury    Pcn [penicillins]      Rash; tolerated ceftriaxone on 1/13/20       Review of Systems   SEE EPIC  Objective:      Vitals:    03/31/20 0848   BP: (!) 145/78   BP Location: Right arm   Patient Position: Sitting   BP Method: Large (Automatic)   Pulse: 104   Temp: 98.1 °F (36.7 °C)   TempSrc: Oral   Weight: 81 kg (178 lb 9.2 oz)   Height: 5' 5.5" (1.664 m)     Physical Exam   NAD  Respir: clear  CHEST:  L permacath in place    Assessment:       Resolved CE after CABG    Plan:       D/C permacath today  PRN f/u    PROCEDURE NOTE:   After sterile prep and drape and infiltration with lidocaine, the permacath cuff was freed up using blunt and sharp dissection, the permacath removed, and manual compression used for hemostasis.   No complications    NONI Gomez III, MD, FACS  Professor and Chief, Vascular and Endovascular Surgery  "

## 2020-04-30 ENCOUNTER — PATIENT MESSAGE (OUTPATIENT)
Dept: CARDIOLOGY | Facility: CLINIC | Age: 55
End: 2020-04-30

## 2020-05-04 ENCOUNTER — PATIENT MESSAGE (OUTPATIENT)
Dept: CARDIOLOGY | Facility: CLINIC | Age: 55
End: 2020-05-04

## 2020-06-04 ENCOUNTER — TELEPHONE (OUTPATIENT)
Dept: CARDIOLOGY | Facility: CLINIC | Age: 55
End: 2020-06-04

## 2020-07-17 ENCOUNTER — OFFICE VISIT (OUTPATIENT)
Dept: URGENT CARE | Facility: CLINIC | Age: 55
End: 2020-07-17
Payer: MEDICAID

## 2020-07-17 ENCOUNTER — HOSPITAL ENCOUNTER (INPATIENT)
Facility: HOSPITAL | Age: 55
LOS: 13 days | Discharge: HOME HEALTH | DRG: 623 | End: 2020-07-30
Attending: EMERGENCY MEDICINE | Admitting: EMERGENCY MEDICINE
Payer: MEDICAID

## 2020-07-17 VITALS
BODY MASS INDEX: 29.41 KG/M2 | HEIGHT: 66 IN | RESPIRATION RATE: 18 BRPM | OXYGEN SATURATION: 97 % | DIASTOLIC BLOOD PRESSURE: 75 MMHG | TEMPERATURE: 96 F | SYSTOLIC BLOOD PRESSURE: 111 MMHG | HEART RATE: 110 BPM | WEIGHT: 183 LBS

## 2020-07-17 DIAGNOSIS — M79.671 RIGHT FOOT PAIN: ICD-10-CM

## 2020-07-17 DIAGNOSIS — I73.9 PERIPHERAL VASCULAR DISEASE: ICD-10-CM

## 2020-07-17 DIAGNOSIS — I25.10 CAD (CORONARY ARTERY DISEASE): ICD-10-CM

## 2020-07-17 DIAGNOSIS — N17.9 AKI (ACUTE KIDNEY INJURY): ICD-10-CM

## 2020-07-17 DIAGNOSIS — A41.9 SEPSIS: ICD-10-CM

## 2020-07-17 DIAGNOSIS — Z01.810 PREOP CARDIOVASCULAR EXAM: ICD-10-CM

## 2020-07-17 DIAGNOSIS — E87.1 HYPONATREMIA: ICD-10-CM

## 2020-07-17 DIAGNOSIS — M86.9 OSTEOMYELITIS OF LEFT FOOT, UNSPECIFIED TYPE: ICD-10-CM

## 2020-07-17 DIAGNOSIS — I10 ESSENTIAL HYPERTENSION: ICD-10-CM

## 2020-07-17 DIAGNOSIS — I25.5 ISCHEMIC CARDIOMYOPATHY: ICD-10-CM

## 2020-07-17 DIAGNOSIS — I50.42 CHRONIC COMBINED SYSTOLIC AND DIASTOLIC HEART FAILURE: ICD-10-CM

## 2020-07-17 DIAGNOSIS — Z79.4 TYPE 2 DIABETES MELLITUS WITHOUT COMPLICATION, WITH LONG-TERM CURRENT USE OF INSULIN: ICD-10-CM

## 2020-07-17 DIAGNOSIS — R73.9 HYPERGLYCEMIA: ICD-10-CM

## 2020-07-17 DIAGNOSIS — I73.9 PERIPHERAL ARTERY DISEASE: ICD-10-CM

## 2020-07-17 DIAGNOSIS — S91.302A NON HEALING LEFT HEEL WOUND: Primary | ICD-10-CM

## 2020-07-17 DIAGNOSIS — E11.9 TYPE 2 DIABETES MELLITUS WITHOUT COMPLICATION, WITH LONG-TERM CURRENT USE OF INSULIN: ICD-10-CM

## 2020-07-17 DIAGNOSIS — N18.30 STAGE 3 CHRONIC KIDNEY DISEASE: ICD-10-CM

## 2020-07-17 DIAGNOSIS — I73.9 PAD (PERIPHERAL ARTERY DISEASE): ICD-10-CM

## 2020-07-17 DIAGNOSIS — M86.172 ACUTE OSTEOMYELITIS OF LEFT CALCANEUS: ICD-10-CM

## 2020-07-17 DIAGNOSIS — E78.2 MIXED HYPERLIPIDEMIA: ICD-10-CM

## 2020-07-17 DIAGNOSIS — S91.302A: Primary | ICD-10-CM

## 2020-07-17 DIAGNOSIS — Z95.1 S/P CABG X 1: ICD-10-CM

## 2020-07-17 LAB
ALBUMIN SERPL BCP-MCNC: 3 G/DL (ref 3.5–5.2)
ALLENS TEST: ABNORMAL
ALP SERPL-CCNC: 115 U/L (ref 55–135)
ALT SERPL W/O P-5'-P-CCNC: <5 U/L (ref 10–44)
ANION GAP SERPL CALC-SCNC: 12 MMOL/L (ref 8–16)
AST SERPL-CCNC: 11 U/L (ref 10–40)
B-OH-BUTYR BLD STRIP-SCNC: 0.5 MMOL/L (ref 0–0.5)
BACTERIA #/AREA URNS HPF: ABNORMAL /HPF
BASOPHILS # BLD AUTO: 0.06 K/UL (ref 0–0.2)
BASOPHILS NFR BLD: 0.6 % (ref 0–1.9)
BILIRUB SERPL-MCNC: 0.5 MG/DL (ref 0.1–1)
BILIRUB UR QL STRIP: NEGATIVE
BUN SERPL-MCNC: 38 MG/DL (ref 6–20)
CALCIUM SERPL-MCNC: 9.5 MG/DL (ref 8.7–10.5)
CHLORIDE SERPL-SCNC: 100 MMOL/L (ref 95–110)
CLARITY UR: ABNORMAL
CO2 SERPL-SCNC: 18 MMOL/L (ref 23–29)
COLOR UR: YELLOW
CREAT SERPL-MCNC: 2.5 MG/DL (ref 0.5–1.4)
CRP SERPL-MCNC: 9.2 MG/L (ref 0–8.2)
DELSYS: ABNORMAL
DIFFERENTIAL METHOD: ABNORMAL
EOSINOPHIL # BLD AUTO: 0 K/UL (ref 0–0.5)
EOSINOPHIL NFR BLD: 0.2 % (ref 0–8)
ERYTHROCYTE [DISTWIDTH] IN BLOOD BY AUTOMATED COUNT: 14.7 % (ref 11.5–14.5)
ERYTHROCYTE [SEDIMENTATION RATE] IN BLOOD BY WESTERGREN METHOD: 139 MM/HR (ref 0–20)
EST. GFR  (AFRICAN AMERICAN): 25 ML/MIN/1.73 M^2
EST. GFR  (NON AFRICAN AMERICAN): 21 ML/MIN/1.73 M^2
GLUCOSE SERPL-MCNC: 483 MG/DL (ref 70–110)
GLUCOSE UR QL STRIP: ABNORMAL
HCO3 UR-SCNC: 21.4 MMOL/L (ref 24–28)
HCT VFR BLD AUTO: 35.4 % (ref 37–48.5)
HGB BLD-MCNC: 11.3 G/DL (ref 12–16)
HGB UR QL STRIP: ABNORMAL
HYALINE CASTS #/AREA URNS LPF: 1 /LPF
IMM GRANULOCYTES # BLD AUTO: 0.04 K/UL (ref 0–0.04)
IMM GRANULOCYTES NFR BLD AUTO: 0.4 % (ref 0–0.5)
KETONES UR QL STRIP: NEGATIVE
LACTATE SERPL-SCNC: 1.4 MMOL/L (ref 0.5–2.2)
LEUKOCYTE ESTERASE UR QL STRIP: ABNORMAL
LYMPHOCYTES # BLD AUTO: 1.6 K/UL (ref 1–4.8)
LYMPHOCYTES NFR BLD: 16.8 % (ref 18–48)
MAGNESIUM SERPL-MCNC: 2.1 MG/DL (ref 1.6–2.6)
MCH RBC QN AUTO: 29.7 PG (ref 27–31)
MCHC RBC AUTO-ENTMCNC: 31.9 G/DL (ref 32–36)
MCV RBC AUTO: 93 FL (ref 82–98)
MICROSCOPIC COMMENT: ABNORMAL
MONOCYTES # BLD AUTO: 0.5 K/UL (ref 0.3–1)
MONOCYTES NFR BLD: 5.5 % (ref 4–15)
NEUTROPHILS # BLD AUTO: 7.5 K/UL (ref 1.8–7.7)
NEUTROPHILS NFR BLD: 76.5 % (ref 38–73)
NITRITE UR QL STRIP: NEGATIVE
NRBC BLD-RTO: 0 /100 WBC
PCO2 BLDA: 42.5 MMHG (ref 35–45)
PH SMN: 7.31 [PH] (ref 7.35–7.45)
PH UR STRIP: 5 [PH] (ref 5–8)
PLATELET # BLD AUTO: 370 K/UL (ref 150–350)
PMV BLD AUTO: 11.2 FL (ref 9.2–12.9)
PO2 BLDA: 18 MMHG (ref 40–60)
POC BE: -5 MMOL/L
POC SATURATED O2: 24 % (ref 95–100)
POC TCO2: 23 MMOL/L (ref 24–29)
POCT GLUCOSE: 418 MG/DL (ref 70–110)
POCT GLUCOSE: 423 MG/DL (ref 70–110)
POTASSIUM SERPL-SCNC: 4.3 MMOL/L (ref 3.5–5.1)
PROT SERPL-MCNC: 8.5 G/DL (ref 6–8.4)
PROT UR QL STRIP: ABNORMAL
RBC # BLD AUTO: 3.81 M/UL (ref 4–5.4)
RBC #/AREA URNS HPF: 0 /HPF (ref 0–4)
SAMPLE: ABNORMAL
SARS-COV-2 RDRP RESP QL NAA+PROBE: NEGATIVE
SITE: ABNORMAL
SODIUM SERPL-SCNC: 130 MMOL/L (ref 136–145)
SP GR UR STRIP: 1.02 (ref 1–1.03)
SQUAMOUS #/AREA URNS HPF: 30 /HPF
URN SPEC COLLECT METH UR: ABNORMAL
UROBILINOGEN UR STRIP-ACNC: NEGATIVE EU/DL
WBC # BLD AUTO: 9.79 K/UL (ref 3.9–12.7)
WBC #/AREA URNS HPF: 3 /HPF (ref 0–5)
YEAST URNS QL MICRO: ABNORMAL

## 2020-07-17 PROCEDURE — 87040 BLOOD CULTURE FOR BACTERIA: CPT | Mod: 59

## 2020-07-17 PROCEDURE — 21400001 HC TELEMETRY ROOM

## 2020-07-17 PROCEDURE — 99213 OFFICE O/P EST LOW 20 MIN: CPT | Mod: S$GLB,,, | Performed by: INTERNAL MEDICINE

## 2020-07-17 PROCEDURE — U0002 COVID-19 LAB TEST NON-CDC: HCPCS

## 2020-07-17 PROCEDURE — 83605 ASSAY OF LACTIC ACID: CPT

## 2020-07-17 PROCEDURE — 85652 RBC SED RATE AUTOMATED: CPT

## 2020-07-17 PROCEDURE — 93010 ELECTROCARDIOGRAM REPORT: CPT | Mod: ,,, | Performed by: INTERNAL MEDICINE

## 2020-07-17 PROCEDURE — 80053 COMPREHEN METABOLIC PANEL: CPT

## 2020-07-17 PROCEDURE — 99213 PR OFFICE/OUTPT VISIT, EST, LEVL III, 20-29 MIN: ICD-10-PCS | Mod: S$GLB,,, | Performed by: INTERNAL MEDICINE

## 2020-07-17 PROCEDURE — 83036 HEMOGLOBIN GLYCOSYLATED A1C: CPT

## 2020-07-17 PROCEDURE — 93005 ELECTROCARDIOGRAM TRACING: CPT

## 2020-07-17 PROCEDURE — 83735 ASSAY OF MAGNESIUM: CPT

## 2020-07-17 PROCEDURE — 99900035 HC TECH TIME PER 15 MIN (STAT)

## 2020-07-17 PROCEDURE — 99291 CRITICAL CARE FIRST HOUR: CPT | Mod: 25

## 2020-07-17 PROCEDURE — 82010 KETONE BODYS QUAN: CPT

## 2020-07-17 PROCEDURE — 25000003 PHARM REV CODE 250: Performed by: PHYSICIAN ASSISTANT

## 2020-07-17 PROCEDURE — 63600175 PHARM REV CODE 636 W HCPCS: Performed by: EMERGENCY MEDICINE

## 2020-07-17 PROCEDURE — 63600175 PHARM REV CODE 636 W HCPCS: Performed by: PHYSICIAN ASSISTANT

## 2020-07-17 PROCEDURE — 84134 ASSAY OF PREALBUMIN: CPT

## 2020-07-17 PROCEDURE — 81000 URINALYSIS NONAUTO W/SCOPE: CPT

## 2020-07-17 PROCEDURE — 82803 BLOOD GASES ANY COMBINATION: CPT

## 2020-07-17 PROCEDURE — 82962 GLUCOSE BLOOD TEST: CPT

## 2020-07-17 PROCEDURE — 96374 THER/PROPH/DIAG INJ IV PUSH: CPT

## 2020-07-17 PROCEDURE — 93010 EKG 12-LEAD: ICD-10-PCS | Mod: ,,, | Performed by: INTERNAL MEDICINE

## 2020-07-17 PROCEDURE — 86140 C-REACTIVE PROTEIN: CPT

## 2020-07-17 PROCEDURE — 85025 COMPLETE CBC W/AUTO DIFF WBC: CPT

## 2020-07-17 RX ORDER — GLUCAGON 1 MG
1 KIT INJECTION
Status: DISCONTINUED | OUTPATIENT
Start: 2020-07-17 | End: 2020-07-30 | Stop reason: HOSPADM

## 2020-07-17 RX ORDER — ONDANSETRON 2 MG/ML
4 INJECTION INTRAMUSCULAR; INTRAVENOUS EVERY 6 HOURS PRN
Status: DISCONTINUED | OUTPATIENT
Start: 2020-07-17 | End: 2020-07-18

## 2020-07-17 RX ORDER — MORPHINE SULFATE 10 MG/ML
2 INJECTION INTRAMUSCULAR; INTRAVENOUS; SUBCUTANEOUS EVERY 4 HOURS PRN
Status: DISCONTINUED | OUTPATIENT
Start: 2020-07-17 | End: 2020-07-30 | Stop reason: HOSPADM

## 2020-07-17 RX ORDER — ESCITALOPRAM OXALATE 10 MG/1
10 TABLET ORAL DAILY
Status: DISCONTINUED | OUTPATIENT
Start: 2020-07-18 | End: 2020-07-17

## 2020-07-17 RX ORDER — CIPROFLOXACIN 2 MG/ML
400 INJECTION, SOLUTION INTRAVENOUS
Status: DISCONTINUED | OUTPATIENT
Start: 2020-07-17 | End: 2020-07-21

## 2020-07-17 RX ORDER — ACETAMINOPHEN 325 MG/1
650 TABLET ORAL EVERY 8 HOURS PRN
Status: DISCONTINUED | OUTPATIENT
Start: 2020-07-17 | End: 2020-07-30 | Stop reason: HOSPADM

## 2020-07-17 RX ORDER — HEPARIN SODIUM 5000 [USP'U]/ML
5000 INJECTION, SOLUTION INTRAVENOUS; SUBCUTANEOUS EVERY 8 HOURS
Status: DISCONTINUED | OUTPATIENT
Start: 2020-07-17 | End: 2020-07-30 | Stop reason: HOSPADM

## 2020-07-17 RX ORDER — LORAZEPAM 2 MG/ML
0.5 INJECTION INTRAMUSCULAR
Status: ACTIVE | OUTPATIENT
Start: 2020-07-17 | End: 2020-07-18

## 2020-07-17 RX ORDER — INSULIN ASPART 100 [IU]/ML
0-5 INJECTION, SOLUTION INTRAVENOUS; SUBCUTANEOUS EVERY 6 HOURS PRN
Status: DISCONTINUED | OUTPATIENT
Start: 2020-07-17 | End: 2020-07-30 | Stop reason: HOSPADM

## 2020-07-17 RX ORDER — AMIODARONE HYDROCHLORIDE 200 MG/1
400 TABLET ORAL DAILY
Status: DISCONTINUED | OUTPATIENT
Start: 2020-07-18 | End: 2020-07-30 | Stop reason: HOSPADM

## 2020-07-17 RX ORDER — ACETAMINOPHEN 325 MG/1
650 TABLET ORAL EVERY 8 HOURS PRN
Status: DISCONTINUED | OUTPATIENT
Start: 2020-07-17 | End: 2020-07-29 | Stop reason: SDUPTHER

## 2020-07-17 RX ORDER — PANTOPRAZOLE SODIUM 40 MG/10ML
40 INJECTION, POWDER, LYOPHILIZED, FOR SOLUTION INTRAVENOUS DAILY
Status: DISCONTINUED | OUTPATIENT
Start: 2020-07-18 | End: 2020-07-22

## 2020-07-17 RX ORDER — CARVEDILOL 6.25 MG/1
6.25 TABLET ORAL DAILY
Status: DISCONTINUED | OUTPATIENT
Start: 2020-07-18 | End: 2020-07-30 | Stop reason: HOSPADM

## 2020-07-17 RX ORDER — ATORVASTATIN CALCIUM 40 MG/1
80 TABLET, FILM COATED ORAL DAILY
Status: DISCONTINUED | OUTPATIENT
Start: 2020-07-18 | End: 2020-07-30 | Stop reason: HOSPADM

## 2020-07-17 RX ORDER — SODIUM CHLORIDE 0.9 % (FLUSH) 0.9 %
10 SYRINGE (ML) INJECTION
Status: DISCONTINUED | OUTPATIENT
Start: 2020-07-17 | End: 2020-07-30 | Stop reason: HOSPADM

## 2020-07-17 RX ORDER — CLOPIDOGREL BISULFATE 75 MG/1
75 TABLET ORAL DAILY
Status: DISCONTINUED | OUTPATIENT
Start: 2020-07-18 | End: 2020-07-30 | Stop reason: HOSPADM

## 2020-07-17 RX ORDER — TALC
6 POWDER (GRAM) TOPICAL NIGHTLY PRN
Status: DISCONTINUED | OUTPATIENT
Start: 2020-07-17 | End: 2020-07-30 | Stop reason: HOSPADM

## 2020-07-17 RX ADMIN — HEPARIN SODIUM 5000 UNITS: 5000 INJECTION INTRAVENOUS; SUBCUTANEOUS at 10:07

## 2020-07-17 RX ADMIN — Medication 6 MG: at 11:07

## 2020-07-17 RX ADMIN — VANCOMYCIN HYDROCHLORIDE 1250 MG: 1.25 INJECTION, POWDER, LYOPHILIZED, FOR SOLUTION INTRAVENOUS at 10:07

## 2020-07-17 RX ADMIN — INSULIN HUMAN 10 UNITS: 100 INJECTION, SOLUTION PARENTERAL at 05:07

## 2020-07-17 RX ADMIN — CIPROFLOXACIN 400 MG: 2 INJECTION, SOLUTION INTRAVENOUS at 06:07

## 2020-07-17 NOTE — Clinical Note
Manual pressure applied to the right femoral artery sheath insertion site. Sheath will be pulled when ACT is less than 160

## 2020-07-17 NOTE — ED TRIAGE NOTES
Pt presents to ED with CC of left heel ulcer x 6 months with pain rating 4/10. Pt is insulin-dependent diabetic. Pt denies any other complaints at this time. AAOx4, able to verbalize and follow commands, ambulate independently.

## 2020-07-17 NOTE — Clinical Note
146 ml injected throughout the case. 54 mL total wasted during the case. 200 mL total used in the case.

## 2020-07-17 NOTE — PROVIDER PROGRESS NOTES - EMERGENCY DEPT.
" Emergency Department TeleTRIAGE Encounter Note      CHIEF COMPLAINT    Chief Complaint   Patient presents with    Blister     blister to left heel, "its very, very bad" pt is diabetic       VITAL SIGNS   Initial Vitals [07/17/20 1211]   BP Pulse Resp Temp SpO2   (!) 143/76 101 20 98.1 °F (36.7 °C) (!) 93 %      MAP       --            ALLERGIES    Review of patient's allergies indicates:   Allergen Reactions    Contrast media      Kidney injury    Pcn [penicillins]      Rash; tolerated ceftriaxone on 1/13/20       PROVIDER TRIAGE NOTE  Pt is a 53 yo female presenting for left heel blister.  Pt was discharged from the hospital 2/8/2020 and was treating at home with ointment.  Pt states blister is worse.  Pt states she was seen at  and advised to come to the ED due to being diabetic and needing further evaluation.  Pt states pain increased over the past few days.        ORDERS  Labs Reviewed   POCT GLUCOSE MONITORING CONTINUOUS       ED Orders (720h ago, onward)    Start Ordered     Status Ordering Provider    07/17/20 1224 07/17/20 1223  POCT glucose  Once      Ordered JORDAN FUCHS            Virtual Visit Note: The provider triage portion of this emergency department evaluation and documentation was performed via [employer], a HIPAA-compliant telemedicine application, in concert with a tele-presenter in the room. A face to face patient evaluation with one of my colleagues will occur once the patient is placed in an emergency department room.      DISCLAIMER: This note was prepared with [employer] voice recognition transcription software. Garbled syntax, mangled pronouns, and other bizarre constructions may be attributed to that software system.  "

## 2020-07-17 NOTE — Clinical Note
Catheter is inserted into the left subclavian vein ostial  LIMA graft. Angiography performed of the graft in multiple views.

## 2020-07-17 NOTE — PROGRESS NOTES
"Subjective:       Patient ID: Suyapa Connelly is a 54 y.o. female.    Vitals:  height is 5' 5.5" (1.664 m) and weight is 83 kg (183 lb).     Chief Complaint: Blister    Pt noticed a small blister 02/08/2020 on the heel of her left foot. She tried treating it herself but it has gotten worse. She did put a mask cream on it and antibiotic oniment       Constitution: Negative for chills, fatigue and fever.   HENT: Negative for congestion and sore throat.    Neck: Negative for painful lymph nodes.   Cardiovascular: Negative for chest pain and leg swelling.   Eyes: Negative for double vision and blurred vision.   Respiratory: Negative for cough and shortness of breath.    Gastrointestinal: Negative for nausea, vomiting and diarrhea.   Genitourinary: Negative for dysuria, frequency, urgency and history of kidney stones.   Musculoskeletal: Negative for joint pain, joint swelling, muscle cramps and muscle ache.   Skin: Positive for wound. Negative for color change, pale, rash, bruising and abscess.   Allergic/Immunologic: Negative for seasonal allergies.   Neurological: Negative for dizziness, history of vertigo, light-headedness, passing out and headaches.   Hematologic/Lymphatic: Negative for swollen lymph nodes.   Psychiatric/Behavioral: Negative for nervous/anxious, sleep disturbance and depression. The patient is not nervous/anxious.        Objective:      Physical Exam    She has a large, foul smelling open wound with purulence. Calcaneal bone is exposed. She has full ROM of the foot. DP pulse weak but present. Skin was warm.   Assessment:       1. Non-healing open wound of heel, left, initial encounter        Plan:         Non-healing open wound of heel, left, initial encounter  -     Refer to Emergency Dept.    Concerned for osteomyelitis. Referring to ED for possible admission. IV antibiotics. SBARC completed.            "

## 2020-07-17 NOTE — ED PROVIDER NOTES
"Encounter Date: 7/17/2020       History     Chief Complaint   Patient presents with    Blister     blister to left heel, "its very, very bad" pt is diabetic     54-year-old female, this medical history CAD s/p CABG, PAD s/p bilateral lower extremity stents, IDDM, HTN, HLD, hx contrast-induce nephropathy which required L IJ permacath placement for dialysis (no longer on dialysis, catheter has been removed), CHF secondary to ischemic cardiomyopathy, history of dilated cardiomyopathy, generalized anxiety disorder, vitamin-D deficiency, neuropathy, presents to ED complaining of wound to left heel.  Patient states she had a small blister to her left heel following her hospitalization in January and February of this year.  She states she has been treating the wound with Neosporin and Band-Aids.  Over the past week wound has become foul-smelling, has started to drain more than usual.  She presented to local urgent care, was urged to present to the ED for higher level of care.    She denies any worsening shortness of breath or dyspnea on exertion, no chest pain, she does admit to baseline claudication symptoms which has not worsened recently.  She denies leg swelling or calf tenderness.  No new cough.  No fever, chills, myalgias, unintentional weight loss.  No recent illness or sick contacts.  No recent hospitalization.  She states she is compliant with all her medications.    She denies any worsening pain to the wound.  She has severe neuropathy.        Review of patient's allergies indicates:   Allergen Reactions    Contrast media      Kidney injury    Pcn [penicillins]      Rash; tolerated ceftriaxone on 1/13/20     Past Medical History:   Diagnosis Date    Anxiety     Depression     Diabetes mellitus     type 2    GERD (gastroesophageal reflux disease)     Hyperlipemia     Hypertension     Myocardial infarction 2010    minor-caused by stress per pt.     Past Surgical History:   Procedure Laterality Date    " Angiogram - Right Extremity Right 7/9/15    angiogram-left leg  10/6/15    CATHETERIZATION OF BOTH LEFT AND RIGHT HEART N/A 12/18/2019    Procedure: CATHETERIZATION, HEART, BOTH LEFT AND RIGHT;  Surgeon: Que Fernando III, MD;  Location: [facility] CATH LAB;  Service: Cardiology;  Laterality: N/A;    CORONARY ANGIOGRAPHY N/A 12/18/2019    Procedure: ANGIOGRAM, CORONARY ARTERY;  Surgeon: Que Fernando III, MD;  Location: [facility] CATH LAB;  Service: Cardiology;  Laterality: N/A;    CORONARY ARTERY BYPASS GRAFT (CABG) N/A 1/14/2020    Procedure: CORONARY ARTERY BYPASS GRAFT (CABG) x 1     Off Pump;  Surgeon: Huang Altamirano MD;  Location: [facility] OR [address] [place] [address];  Service: Cardiovascular;  Laterality: N/A;    INSERTION OF TUNNELED CENTRAL VENOUS HEMODIALYSIS CATHETER N/A 1/27/2020    Procedure: Insertion, Catheter, Central Venous, Hemodialysis;  Surgeon: ESTEBAN Gomez III, MD;  Location: [facility] CATH LAB;  Service: Peripheral Vascular;  Laterality: N/A;     Family History   Problem Relation Age of Onset    Anesthesia problems Neg Hx      Social History     Tobacco Use    Smoking status: Never Smoker    Smokeless tobacco: Never Used   Substance Use Topics    Alcohol use: No    Drug use: Yes     Types: Marijuana     Comment: used yesterday     Review of Systems   Constitutional: Negative for activity change, appetite change, chills, fatigue and fever.   Respiratory: Negative for cough and shortness of breath.    Cardiovascular: Negative for chest pain and leg swelling.   Gastrointestinal: Negative for nausea and vomiting.   Genitourinary: Negative for dysuria, flank pain and frequency.   Musculoskeletal: Negative for arthralgias, myalgias, neck pain and neck stiffness.   Skin: Positive for wound.   Neurological: Negative for dizziness, syncope, weakness and light-headedness.       Physical Exam     Initial Vitals [07/17/20 1211]   BP Pulse Resp Temp SpO2   (!) 143/76 101 20 98.1 °F (36.7 °C) (!) 93 %      MAP        --         Physical Exam    Nursing note and vitals reviewed.  Constitutional: She appears well-developed and well-nourished. She is not diaphoretic. No distress.   Well-appearing and nontoxic.  Resting comfortably on exam table.  Speaking in full sentences without pause or difficulty.   HENT:   Head: Normocephalic and atraumatic.   Eyes: EOM are normal.   Neck: Neck supple.   Cardiovascular: Intact distal pulses.   No murmur heard.  Normal sinus rhythm.  No significant pretibial edema or unilateral leg swelling.  No palpable distal pulses, there is dopplerable right PT, left DP.   Pulmonary/Chest: Breath sounds normal. No respiratory distress. She has no wheezes. She has no rhonchi. She exhibits no tenderness.   Abdominal: There is no abdominal tenderness.   Musculoskeletal: Normal range of motion.      Comments: There is a large wound to the left calcaneus there is visible bone, there is foul odor.  No surrounding erythema or warmth.   Neurological: She is alert and oriented to person, place, and time. GCS score is 15. GCS eye subscore is 4. GCS verbal subscore is 5. GCS motor subscore is 6.   Skin: Skin is warm.   Psychiatric: She has a normal mood and affect. Thought content normal.                        TTE 1/17/20:  Mildly decreased left ventricular systolic function. The estimated ejection fraction is 50%.  Concentric left ventricular remodeling.  Local segmental wall motion abnormalities.  Septal wall has abnormal motion.  Grade I (mild) left ventricular diastolic dysfunction consistent with impaired relaxation.  Normal right ventricular systolic function.  Severe tricuspid regurgitation.  Mechanically ventilated; cannot use inferior caval vein diameter to estimate central venous pressure.      ED Course   Procedures  Labs Reviewed   CBC W/ AUTO DIFFERENTIAL - Abnormal; Notable for the following components:       Result Value    RBC 3.81 (*)     Hemoglobin 11.3 (*)     Hematocrit 35.4 (*)     Mean  Corpuscular Hemoglobin Conc 31.9 (*)     RDW 14.7 (*)     Platelets 370 (*)     Gran% 76.5 (*)     Lymph% 16.8 (*)     All other components within normal limits   COMPREHENSIVE METABOLIC PANEL - Abnormal; Notable for the following components:    Sodium 130 (*)     CO2 18 (*)     Glucose 483 (*)     BUN, Bld 38 (*)     Creatinine 2.5 (*)     Total Protein 8.5 (*)     Albumin 3.0 (*)     ALT <5 (*)     eGFR if  25 (*)     eGFR if non  21 (*)     All other components within normal limits    Narrative:     GLUCOSE   critical result(s) called and verbal readback obtained from   ANGELES LANG by LM1 07/17/2020 17:27   SEDIMENTATION RATE - Abnormal; Notable for the following components:    Sed Rate 139 (*)     All other components within normal limits   C-REACTIVE PROTEIN - Abnormal; Notable for the following components:    CRP 9.2 (*)     All other components within normal limits   URINALYSIS, REFLEX TO URINE CULTURE - Abnormal; Notable for the following components:    Appearance, UA Cloudy (*)     Protein, UA 3+ (*)     Glucose, UA 3+ (*)     Occult Blood UA 1+ (*)     Leukocytes, UA Trace (*)     All other components within normal limits    Narrative:     Specimen Source->Urine   URINALYSIS MICROSCOPIC - Abnormal; Notable for the following components:    Bacteria Many (*)     All other components within normal limits    Narrative:     Specimen Source->Urine   POCT GLUCOSE - Abnormal; Notable for the following components:    POCT Glucose 418 (*)     All other components within normal limits   ISTAT PROCEDURE - Abnormal; Notable for the following components:    POC PH 7.311 (*)     POC PO2 18 (*)     POC HCO3 21.4 (*)     POC SATURATED O2 24 (*)     POC TCO2 23 (*)     All other components within normal limits   POCT GLUCOSE - Abnormal; Notable for the following components:    POCT Glucose 423 (*)     All other components within normal limits   CULTURE, BLOOD   CULTURE, BLOOD   LACTIC ACID,  PLASMA   MAGNESIUM   SARS-COV-2 RNA AMPLIFICATION, QUAL   BETA - HYDROXYBUTYRATE, SERUM   HEMOGLOBIN A1C   POCT GLUCOSE MONITORING CONTINUOUS          Imaging Results          X-Ray Chest 1 View (Final result)  Result time 07/17/20 16:49:37    Final result by Miguel Hubbard MD (07/17/20 16:49:37)                 Impression:      1. No acute cardiopulmonary process appreciated.      Electronically signed by: Miguel Hubbard  Date:    07/17/2020  Time:    16:49             Narrative:    EXAMINATION:  XR CHEST 1 VIEW    CLINICAL HISTORY:  Unspecified open wound, left foot, initial encounter    TECHNIQUE:  Single PA view of the chest    COMPARISON:  Chest radiograph 03/23/2020    FINDINGS:  Postsurgical changes compatible with prior CABG, not significantly changed.  Interval removal of left internal jugular vein approach tunneled HD catheter.    Cardiomediastinal silhouette is within normal limits.    Thin bibasilar curvilinear airspace opacities which may reflect scarring versus chronic atelectasis, improved.  Otherwise, no focal consolidation, overt interstitial edema, sizable pleural effusion or pneumothorax.    Multilevel degenerative changes of the imaged spine.                               X-Ray Foot Complete Left (Final result)  Result time 07/17/20 16:17:27    Final result by Solomon Jauregui DO (07/17/20 16:17:27)                 Impression:      Please see above      Electronically signed by: Solomon Jauregui DO  Date:    07/17/2020  Time:    16:17             Narrative:    EXAMINATION:  XR FOOT COMPLETE 3 VIEW LEFT    CLINICAL HISTORY:  .  Unspecified open wound, left foot, initial encounter    TECHNIQUE:  AP, lateral and oblique views of the left foot were performed.    COMPARISON:  None    FINDINGS:  No evidence for acute fracture line or dislocation of the left foot.  Prominent mixed density opacity overlies the 1st distal phalanx with radiopaque and soft tissue density overall indeterminate.  Clinical  correlation advised.    There is a large lucency projected over the calcaneal soft tissues concerning for possible ulceration.  No definite bony erosion of the underlying calcaneus.  Clinical correlation and further evaluation with MRI as warranted if patient compatible.  There is prominent vascular calcifications within the soft tissues.                                 Medical Decision Making:   Differential Diagnosis:   Nonhealing diabetic wound, ischemic wound, osteomyelitis, PAD  Clinical Tests:   Lab Tests: Ordered and Reviewed  Radiological Study: Ordered and Reviewed  ED Management:  54-year-old female insulin-dependent diabetic with nonhealing wound since January February this year.  Recently worsened over the past week.  No B symptoms.  There is visible calcaneus.  Foul-smelling wound.  Presentation consistent with suspected osteomyelitis.  Given patient's significant comorbidities, she will be admitted and started on broad-spectrum antibiotics, vanc, Cipro for Pseudomonas coverage, aztreonam secondary to penicillin allergy.  I have called and spoken with Dr. Mason of [facility] medicine.  Patient to be admitted as an inpatient.  Sliding-scale insulin.  MRI without of heel ordered.  Podiatry consult.  Wound care consult.  I have spoken with patient at length regarding need for inpatient stay.  She does understand and agree.  She is stable, normotensive, afebrile.  No leukocytosis.  No evidence of DKA.    Also patient with ROSLYN.  She finished dialysis in the beginning of March.  Her left permacath has been removed.  Mucous membranes are moist, she is normotensive with normal heart rate.  She does not appear dehydrated.  No cardiac complaints, no shortness of breath, no evidence of volume overload.                   ED Course as of Jul 17 1904 Fri Jul 17, 2020   1732 Corrected Sodium 136.     [SM]      ED Course User Index  [SM] Roberto Mcqueen PA-C                 Clinical Impression:       ICD-10-CM  ICD-9-CM   1. Non healing left heel wound  S91.302A 892.1   2. Hyperglycemia  R73.9 790.29   3. ROSLYN (acute kidney injury)  N17.9 584.9   4. Osteomyelitis of left foot, unspecified type  M86.9 730.27         Disposition:   Disposition: Admitted  Condition: Stable     ED Disposition Condition    Admit                           Roberto Mcqueen PA-C  07/17/20 1904

## 2020-07-17 NOTE — Clinical Note
Catheter is inserted into the ostium   circumflex. Angiography performed of the left coronary arteries.

## 2020-07-18 PROBLEM — M86.172 ACUTE OSTEOMYELITIS OF LEFT CALCANEUS: Status: ACTIVE | Noted: 2020-07-18

## 2020-07-18 PROBLEM — N18.30 STAGE 3 CHRONIC KIDNEY DISEASE: Status: ACTIVE | Noted: 2020-07-18

## 2020-07-18 LAB
ANION GAP SERPL CALC-SCNC: 11 MMOL/L (ref 8–16)
BASOPHILS # BLD AUTO: 0.05 K/UL (ref 0–0.2)
BASOPHILS NFR BLD: 0.6 % (ref 0–1.9)
BUN SERPL-MCNC: 37 MG/DL (ref 6–20)
CALCIUM SERPL-MCNC: 9.5 MG/DL (ref 8.7–10.5)
CHLORIDE SERPL-SCNC: 104 MMOL/L (ref 95–110)
CO2 SERPL-SCNC: 17 MMOL/L (ref 23–29)
CREAT SERPL-MCNC: 2 MG/DL (ref 0.5–1.4)
DIFFERENTIAL METHOD: ABNORMAL
EOSINOPHIL # BLD AUTO: 0.1 K/UL (ref 0–0.5)
EOSINOPHIL NFR BLD: 1.1 % (ref 0–8)
ERYTHROCYTE [DISTWIDTH] IN BLOOD BY AUTOMATED COUNT: 14.6 % (ref 11.5–14.5)
EST. GFR  (AFRICAN AMERICAN): 32 ML/MIN/1.73 M^2
EST. GFR  (NON AFRICAN AMERICAN): 28 ML/MIN/1.73 M^2
ESTIMATED AVG GLUCOSE: ABNORMAL MG/DL (ref 68–131)
GLUCOSE SERPL-MCNC: 211 MG/DL (ref 70–110)
HBA1C MFR BLD HPLC: >14 % (ref 4–5.6)
HCT VFR BLD AUTO: 35 % (ref 37–48.5)
HGB BLD-MCNC: 11.4 G/DL (ref 12–16)
IMM GRANULOCYTES # BLD AUTO: 0.03 K/UL (ref 0–0.04)
IMM GRANULOCYTES NFR BLD AUTO: 0.3 % (ref 0–0.5)
LYMPHOCYTES # BLD AUTO: 2.1 K/UL (ref 1–4.8)
LYMPHOCYTES NFR BLD: 23.2 % (ref 18–48)
MCH RBC QN AUTO: 30.9 PG (ref 27–31)
MCHC RBC AUTO-ENTMCNC: 32.6 G/DL (ref 32–36)
MCV RBC AUTO: 95 FL (ref 82–98)
MONOCYTES # BLD AUTO: 0.7 K/UL (ref 0.3–1)
MONOCYTES NFR BLD: 8.2 % (ref 4–15)
NEUTROPHILS # BLD AUTO: 6 K/UL (ref 1.8–7.7)
NEUTROPHILS NFR BLD: 66.6 % (ref 38–73)
NRBC BLD-RTO: 0 /100 WBC
PLATELET # BLD AUTO: 349 K/UL (ref 150–350)
PMV BLD AUTO: 10.9 FL (ref 9.2–12.9)
POCT GLUCOSE: 184 MG/DL (ref 70–110)
POCT GLUCOSE: 213 MG/DL (ref 70–110)
POCT GLUCOSE: 224 MG/DL (ref 70–110)
POCT GLUCOSE: 238 MG/DL (ref 70–110)
POCT GLUCOSE: 402 MG/DL (ref 70–110)
POTASSIUM SERPL-SCNC: 4.4 MMOL/L (ref 3.5–5.1)
PREALB SERPL-MCNC: 25 MG/DL (ref 20–43)
RBC # BLD AUTO: 3.69 M/UL (ref 4–5.4)
SODIUM SERPL-SCNC: 132 MMOL/L (ref 136–145)
VANCOMYCIN SERPL-MCNC: 19.1 UG/ML
WBC # BLD AUTO: 8.98 K/UL (ref 3.9–12.7)

## 2020-07-18 PROCEDURE — 88305 TISSUE EXAM BY PATHOLOGIST: CPT | Mod: 26,,, | Performed by: PATHOLOGY

## 2020-07-18 PROCEDURE — 87205 SMEAR GRAM STAIN: CPT

## 2020-07-18 PROCEDURE — 85025 COMPLETE CBC W/AUTO DIFF WBC: CPT

## 2020-07-18 PROCEDURE — 93010 EKG 12-LEAD: ICD-10-PCS | Mod: ,,, | Performed by: INTERNAL MEDICINE

## 2020-07-18 PROCEDURE — 11042 PR DEBRIDEMENT, SKIN, SUB-Q TISSUE,=<20 SQ CM: ICD-10-PCS | Mod: ,,, | Performed by: PODIATRIST

## 2020-07-18 PROCEDURE — 21400001 HC TELEMETRY ROOM

## 2020-07-18 PROCEDURE — 63600175 PHARM REV CODE 636 W HCPCS: Performed by: PHYSICIAN ASSISTANT

## 2020-07-18 PROCEDURE — 80202 ASSAY OF VANCOMYCIN: CPT

## 2020-07-18 PROCEDURE — 88311 DECALCIFY TISSUE: CPT | Mod: 26,,, | Performed by: PATHOLOGY

## 2020-07-18 PROCEDURE — 36410 VNPNXR 3YR/> PHY/QHP DX/THER: CPT

## 2020-07-18 PROCEDURE — C9399 UNCLASSIFIED DRUGS OR BIOLOG: HCPCS | Performed by: HOSPITALIST

## 2020-07-18 PROCEDURE — 63600175 PHARM REV CODE 636 W HCPCS: Performed by: HOSPITALIST

## 2020-07-18 PROCEDURE — 25000003 PHARM REV CODE 250: Performed by: INTERNAL MEDICINE

## 2020-07-18 PROCEDURE — 87186 SC STD MICRODIL/AGAR DIL: CPT

## 2020-07-18 PROCEDURE — 20220 BONE BIOPSY TROCAR/NDL SUPFC: CPT | Mod: 59,,, | Performed by: PODIATRIST

## 2020-07-18 PROCEDURE — 25000003 PHARM REV CODE 250: Performed by: HOSPITALIST

## 2020-07-18 PROCEDURE — 88311 DECALCIFY TISSUE: CPT | Performed by: PATHOLOGY

## 2020-07-18 PROCEDURE — S0073 INJECTION, AZTREONAM, 500 MG: HCPCS | Performed by: PHYSICIAN ASSISTANT

## 2020-07-18 PROCEDURE — 87077 CULTURE AEROBIC IDENTIFY: CPT

## 2020-07-18 PROCEDURE — 93005 ELECTROCARDIOGRAM TRACING: CPT

## 2020-07-18 PROCEDURE — C9113 INJ PANTOPRAZOLE SODIUM, VIA: HCPCS | Performed by: HOSPITALIST

## 2020-07-18 PROCEDURE — S0073 INJECTION, AZTREONAM, 500 MG: HCPCS | Performed by: HOSPITALIST

## 2020-07-18 PROCEDURE — 94761 N-INVAS EAR/PLS OXIMETRY MLT: CPT

## 2020-07-18 PROCEDURE — 88311 PR  DECALCIFY TISSUE: ICD-10-PCS | Mod: 26,,, | Performed by: PATHOLOGY

## 2020-07-18 PROCEDURE — 36415 COLL VENOUS BLD VENIPUNCTURE: CPT

## 2020-07-18 PROCEDURE — 63600175 PHARM REV CODE 636 W HCPCS: Performed by: INTERNAL MEDICINE

## 2020-07-18 PROCEDURE — 11042 DBRDMT SUBQ TIS 1ST 20SQCM/<: CPT | Mod: ,,, | Performed by: PODIATRIST

## 2020-07-18 PROCEDURE — 87147 CULTURE TYPE IMMUNOLOGIC: CPT

## 2020-07-18 PROCEDURE — 25000003 PHARM REV CODE 250: Performed by: PHYSICIAN ASSISTANT

## 2020-07-18 PROCEDURE — 88305 TISSUE EXAM BY PATHOLOGIST: ICD-10-PCS | Mod: 26,,, | Performed by: PATHOLOGY

## 2020-07-18 PROCEDURE — 63600175 PHARM REV CODE 636 W HCPCS: Performed by: PODIATRIST

## 2020-07-18 PROCEDURE — 93010 ELECTROCARDIOGRAM REPORT: CPT | Mod: ,,, | Performed by: INTERNAL MEDICINE

## 2020-07-18 PROCEDURE — 20220 PR BONE BIOPSY,TROCAR/NEEDLE SUPERF: ICD-10-PCS | Mod: 59,,, | Performed by: PODIATRIST

## 2020-07-18 PROCEDURE — 87070 CULTURE OTHR SPECIMN AEROBIC: CPT

## 2020-07-18 PROCEDURE — 99233 SBSQ HOSP IP/OBS HIGH 50: CPT | Mod: 25,,, | Performed by: PODIATRIST

## 2020-07-18 PROCEDURE — 88305 TISSUE EXAM BY PATHOLOGIST: CPT | Performed by: PATHOLOGY

## 2020-07-18 PROCEDURE — 80048 BASIC METABOLIC PNL TOTAL CA: CPT

## 2020-07-18 PROCEDURE — 99233 PR SUBSEQUENT HOSPITAL CARE,LEVL III: ICD-10-PCS | Mod: 25,,, | Performed by: PODIATRIST

## 2020-07-18 RX ORDER — VANCOMYCIN HCL IN 5 % DEXTROSE 1G/250ML
1000 PLASTIC BAG, INJECTION (ML) INTRAVENOUS ONCE
Status: COMPLETED | OUTPATIENT
Start: 2020-07-18 | End: 2020-07-18

## 2020-07-18 RX ORDER — NAPROXEN SODIUM 220 MG/1
81 TABLET, FILM COATED ORAL DAILY
Status: DISCONTINUED | OUTPATIENT
Start: 2020-07-18 | End: 2020-07-30 | Stop reason: HOSPADM

## 2020-07-18 RX ORDER — MORPHINE SULFATE 1 MG/ML
1 INJECTION, SOLUTION EPIDURAL; INTRATHECAL; INTRAVENOUS ONCE
Status: DISCONTINUED | OUTPATIENT
Start: 2020-07-18 | End: 2020-07-18

## 2020-07-18 RX ORDER — MORPHINE SULFATE 10 MG/ML
1 INJECTION INTRAMUSCULAR; INTRAVENOUS; SUBCUTANEOUS ONCE
Status: COMPLETED | OUTPATIENT
Start: 2020-07-18 | End: 2020-07-18

## 2020-07-18 RX ORDER — GABAPENTIN 300 MG/1
300 CAPSULE ORAL 3 TIMES DAILY
Status: DISCONTINUED | OUTPATIENT
Start: 2020-07-18 | End: 2020-07-21

## 2020-07-18 RX ORDER — ONDANSETRON 2 MG/ML
8 INJECTION INTRAMUSCULAR; INTRAVENOUS EVERY 8 HOURS PRN
Status: DISCONTINUED | OUTPATIENT
Start: 2020-07-18 | End: 2020-07-30 | Stop reason: HOSPADM

## 2020-07-18 RX ORDER — DIPHENHYDRAMINE HCL 25 MG
25 CAPSULE ORAL EVERY 6 HOURS PRN
Status: DISCONTINUED | OUTPATIENT
Start: 2020-07-18 | End: 2020-07-28

## 2020-07-18 RX ADMIN — HEPARIN SODIUM 5000 UNITS: 5000 INJECTION INTRAVENOUS; SUBCUTANEOUS at 05:07

## 2020-07-18 RX ADMIN — GABAPENTIN 300 MG: 300 CAPSULE ORAL at 09:07

## 2020-07-18 RX ADMIN — INSULIN ASPART 5 UNITS: 100 INJECTION, SOLUTION INTRAVENOUS; SUBCUTANEOUS at 12:07

## 2020-07-18 RX ADMIN — DIPHENHYDRAMINE HYDROCHLORIDE 25 MG: 25 CAPSULE ORAL at 12:07

## 2020-07-18 RX ADMIN — INSULIN ASPART 2 UNITS: 100 INJECTION, SOLUTION INTRAVENOUS; SUBCUTANEOUS at 09:07

## 2020-07-18 RX ADMIN — VANCOMYCIN HYDROCHLORIDE 1000 MG: 10 INJECTION, POWDER, LYOPHILIZED, FOR SOLUTION INTRAVENOUS at 05:07

## 2020-07-18 RX ADMIN — INSULIN ASPART 2 UNITS: 100 INJECTION, SOLUTION INTRAVENOUS; SUBCUTANEOUS at 06:07

## 2020-07-18 RX ADMIN — AZTREONAM 1000 MG: 1 INJECTION, POWDER, LYOPHILIZED, FOR SOLUTION INTRAMUSCULAR; INTRAVENOUS at 02:07

## 2020-07-18 RX ADMIN — PANTOPRAZOLE SODIUM 40 MG: 40 INJECTION, POWDER, FOR SOLUTION INTRAVENOUS at 09:07

## 2020-07-18 RX ADMIN — HEPARIN SODIUM 5000 UNITS: 5000 INJECTION INTRAVENOUS; SUBCUTANEOUS at 09:07

## 2020-07-18 RX ADMIN — AZTREONAM 1000 MG: 1 INJECTION, POWDER, LYOPHILIZED, FOR SOLUTION INTRAMUSCULAR; INTRAVENOUS at 07:07

## 2020-07-18 RX ADMIN — MORPHINE SULFATE 1 MG: 10 INJECTION INTRAVENOUS at 08:07

## 2020-07-18 RX ADMIN — MORPHINE SULFATE 2 MG: 10 INJECTION INTRAVENOUS at 09:07

## 2020-07-18 RX ADMIN — INSULIN DETEMIR 15 UNITS: 100 INJECTION, SOLUTION SUBCUTANEOUS at 09:07

## 2020-07-18 RX ADMIN — AZTREONAM 1000 MG: 1 INJECTION, POWDER, LYOPHILIZED, FOR SOLUTION INTRAMUSCULAR; INTRAVENOUS at 09:07

## 2020-07-18 RX ADMIN — MORPHINE SULFATE 2 MG: 10 INJECTION INTRAVENOUS at 06:07

## 2020-07-18 RX ADMIN — HEPARIN SODIUM 5000 UNITS: 5000 INJECTION INTRAVENOUS; SUBCUTANEOUS at 01:07

## 2020-07-18 RX ADMIN — GABAPENTIN 300 MG: 300 CAPSULE ORAL at 03:07

## 2020-07-18 RX ADMIN — MORPHINE SULFATE 2 MG: 10 INJECTION INTRAVENOUS at 05:07

## 2020-07-18 RX ADMIN — ONDANSETRON HYDROCHLORIDE 4 MG: 2 SOLUTION INTRAMUSCULAR; INTRAVENOUS at 02:07

## 2020-07-18 RX ADMIN — MORPHINE SULFATE 2 MG: 10 INJECTION INTRAVENOUS at 02:07

## 2020-07-18 NOTE — HOSPITAL COURSE
Suyapa Connelly is a 54 y.o. female that (in part)  has a past medical history of Anxiety, Depression, Diabetes mellitus, GERD (gastroesophageal reflux disease), Hyperlipemia, Hypertension, and Myocardial infarction.  has a past surgical history that includes Angiogram - Right Extremity (Right, 7/9/15); angiogram-left leg (10/6/15); Catheterization of both left and right heart (N/A, 12/18/2019); Coronary angiography (N/A, 12/18/2019); Coronary Artery Bypass Graft (CABG) (N/A, 1/14/2020); and Insertion of tunneled central venous hemodialysis catheter (N/A, 1/27/2020). Presents to [facility] complaining of the left heel foot wound.  MRI confirmed osteo. Podiatry, ID, and vascular were consulted.  Podiatry discussed options with patient. She has chosen 6 weeks of IV abx. ID has been consulted to tailor abx. She has been continued on Aztreonam.  Blood cultures were NG. Bone bx on 7/18 has grown Enterococcus faecalis, Grp B Strep, and Enterobacter cloacae. Vascular Surgery consulted and did an angiogram which revealed adequate artery for access the left SFA and AK popliteal artery occlusion.  Patient with renal failure and Nephrology consulted.  Renal function continues to improve and ok to proceed with vascular intervention.  Podiatry planning debridement post vascular procedure.  Creatinine peaked at 2.4 and is currently 1.2. patient had a stress test on 7/27 that showed reversible defects in anterior, inferior and lateral walls. Podiatry signed off. ID consulted to make final ID recs for home.  Patient had an abnormal stress test and went for Angio on 7/28 with stent to Cx. Cards continued to follow the patient.  Nephrology followed for renal failure. Patient will be discharged to home with 6 weeks of Zosyn. Out patient wound care. Follow up with vascular as well. Diet- low NA, ADA 2000 arya diet.

## 2020-07-18 NOTE — ASSESSMENT & PLAN NOTE
MRI confirms osteo. Bone Bx. IV abx.  6 weeks with H/H on discharge later this week. Follow podiatry recs.

## 2020-07-18 NOTE — NURSING
Patient arrived to floor, IV to LAC infiltrated. DC'd. Called for US guided IV placement as patient is difficult stick.   Declined

## 2020-07-18 NOTE — CONSULTS
[facility]  Podiatry  Consult Note    Patient Name: Suyapa Connelly  MRN: [id number]  Admission Date: 7/17/2020  Hospital Length of Stay: 1 days  Attending Physician: Hitesh Mason MD  Primary Care Provider: Erlinda Rahman NP     Inpatient consult to Podiatry  Consult performed by: Rosio Mayes DPM  Consult ordered by: Aubrey Medrano MD        Subjective:     History of Present Illness: 55 y/o male PMH DM2, PAD, CABG admitted for left heel infection. Reports wound started as a blister in Jan. 2020 after she was in a coma for 11 days following CABG procedure. Reports she was then discharged to rehab. Reports applying neosporin to left heel ulceration however the wound has progressively worsened in the past week. Reports she did not seek medical attention for the left heel wound prior due to COVID concerns as she was taking care of her elderly parents. Reports nausea today however reports this has been present for several weeks was taking zofran for this. Denies fevers, chills.      Scheduled Meds:   amiodarone  400 mg Oral Daily    aspirin  81 mg Oral Daily    atorvastatin  80 mg Oral Daily    aztreonam  1,000 mg Intravenous Q8H    carvediloL  6.25 mg Oral Daily    ciprofloxacin  400 mg Intravenous Q18H    clopidogreL  75 mg Oral Daily    gabapentin  300 mg Oral TID    heparin (porcine)  5,000 Units Subcutaneous Q8H    insulin detemir U-100  15 Units Subcutaneous Daily    pantoprazole  40 mg Intravenous Daily     Continuous Infusions:  PRN Meds:acetaminophen, acetaminophen, dextrose 50%, glucagon (human recombinant), insulin aspart U-100, melatonin, morphine, ondansetron, sodium chloride 0.9%, Pharmacy to dose Vancomycin consult **AND** vancomycin - pharmacy to dose    Review of patient's allergies indicates:   Allergen Reactions    Contrast media      Kidney injury    Pcn [penicillins]      Rash; tolerated ceftriaxone on 1/13/20        Past Medical History:    Diagnosis Date    Anxiety     Depression     Diabetes mellitus     type 2    GERD (gastroesophageal reflux disease)     Hyperlipemia     Hypertension     Myocardial infarction 2010    minor-caused by stress per pt.     Past Surgical History:   Procedure Laterality Date    Angiogram - Right Extremity Right 7/9/15    angiogram-left leg  10/6/15    CATHETERIZATION OF BOTH LEFT AND RIGHT HEART N/A 12/18/2019    Procedure: CATHETERIZATION, HEART, BOTH LEFT AND RIGHT;  Surgeon: Que Fernando III, MD;  [facility];  Service: Cardiology;  Laterality: N/A;    CORONARY ANGIOGRAPHY N/A 12/18/2019    Procedure: ANGIOGRAM, CORONARY ARTERY;  Surgeon: Que Fernando III, MD;  [facility];  Service: Cardiology;  Laterality: N/A;    CORONARY ARTERY BYPASS GRAFT (CABG) N/A 1/14/2020    Procedure: CORONARY ARTERY BYPASS GRAFT (CABG) x 1     Off Pump;  Surgeon: Huang Altamirano MD;  Location: [facility] OR [address] [place] [address];  Service: Cardiovascular;  Laterality: N/A;    INSERTION OF TUNNELED CENTRAL VENOUS HEMODIALYSIS CATHETER N/A 1/27/2020    Procedure: Insertion, Catheter, Central Venous, Hemodialysis;  Surgeon: ESTEBAN Gomez III, MD;  Location: [facility] CATH LAB;  Service: Peripheral Vascular;  Laterality: N/A;       Family History     None        Tobacco Use    Smoking status: Never Smoker    Smokeless tobacco: Never Used   Substance and Sexual Activity    Alcohol use: No    Drug use: Yes     Types: Marijuana     Comment: used yesterday    Sexual activity: Not on file     Review of Systems   Constitutional: Negative.    Respiratory: Negative.    Cardiovascular: Negative.    Gastrointestinal: Negative.    Genitourinary: Negative.    Musculoskeletal: Positive for arthralgias.   Skin: Positive for wound.   Neurological: Negative.    Psychiatric/Behavioral: Negative.      Objective:     Vital Signs (Most Recent):  Temp: 97.9 °F (36.6 °C) (07/18/20 0807)  Pulse: 96 (07/18/20 0807)  Resp: 19  (07/18/20 0853)  BP: (!) 117/58 (07/18/20 0807)  SpO2: 98 % (07/18/20 0807) Vital Signs (24h Range):  Temp:  [97.8 °F (36.6 °C)-98.3 °F (36.8 °C)] 97.9 °F (36.6 °C)  Pulse:  [] 96  Resp:  [16-45] 19  SpO2:  [93 %-98 %] 98 %  BP: (117-160)/(58-89) 117/58     Weight: 83 kg (182 lb 15.7 oz)  Body mass index is 30.45 kg/m².    Foot Exam    General  Orientation: alert and oriented to person, place, and time       Right Foot/Ankle     Inspection and Palpation  Swelling: none     Neurovascular  Dorsalis pedis: 1+  Posterior tibial: 1+  Saphenous nerve sensation: diminished  Tibial nerve sensation: diminished  Superficial peroneal nerve sensation: diminished  Deep peroneal nerve sensation: diminished  Sural nerve sensation: diminished      Left Foot/Ankle      Inspection and Palpation  Tenderness: calcaneus tenderness   Skin Exam: ulcer;     Neurovascular  Dorsalis pedis: 1+  Posterior tibial: 1+  Saphenous nerve sensation: diminished  Tibial nerve sensation: diminished  Superficial peroneal nerve sensation: diminished  Deep peroneal nerve sensation: diminished  Sural nerve sensation: diminished          7/18/20:  Wound 1: Left heel   Measurement: 7xwa9oyf2.2cm  pre debridement 5.3cmx5.3cmx0.5cm post debridement.  Base: fibrous and necrotic eschar base  Periwound skin: HPK  Drainage: Purulent with malodor  Erythema: mild  Probe: probe to bone.         Pre debridement         Post debridement         Laboratory:  CBC:   Recent Labs   Lab 07/18/20 0428   WBC 8.98   RBC 3.69*   HGB 11.4*   HCT 35.0*      MCV 95   MCH 30.9   MCHC 32.6     CMP:   Recent Labs   Lab 07/17/20  1631 07/18/20 0428   * 211*   CALCIUM 9.5 9.5   ALBUMIN 3.0*  --    PROT 8.5*  --    * 132*   K 4.3 4.4   CO2 18* 17*    104   BUN 38* 37*   CREATININE 2.5* 2.0*   ALKPHOS 115  --    ALT <5*  --    AST 11  --    BILITOT 0.5  --        Diagnostic Results:  X-Ray Foot Complete Left  Order: [id number]  Status:  Final result    Visible to patient:  No (not released) Next appt:  None Dx:  Non healing left heel wound  Details    Reading Physician Reading Date Result Priority   Solomon AMable Jauregui DO  [phone] 7/17/2020 STAT      Narrative & Impression     EXAMINATION:  XR FOOT COMPLETE 3 VIEW LEFT     CLINICAL HISTORY:  .  Unspecified open wound, left foot, initial encounter     TECHNIQUE:  AP, lateral and oblique views of the left foot were performed.     COMPARISON:  None     FINDINGS:  No evidence for acute fracture line or dislocation of the left foot.  Prominent mixed density opacity overlies the 1st distal phalanx with radiopaque and soft tissue density overall indeterminate.  Clinical correlation advised.     There is a large lucency projected over the calcaneal soft tissues concerning for possible ulceration.  No definite bony erosion of the underlying calcaneus.  Clinical correlation and further evaluation with MRI as warranted if patient compatible.  There is prominent vascular calcifications within the soft tissues.     Impression:     Please see above              MRI Foot (Hindfoot) Left Without Contrast  Order: [id number]  Status:  Final result   Visible to patient:  No (not released) Next appt:  None  Details    Reading Physician Reading Date Result Priority   Ayesha Lindo MD  [phone] 7/17/2020 STAT      Narrative & Impression     EXAMINATION:  MRI FOOT (HINDFOOT) LEFT WITHOUT CONTRAST     CLINICAL HISTORY:  Osteomyelitis suspected, diabetic;calcaneus osteo;     TECHNIQUE:  Multiplanar, multisequence images were obtained of the left hind foot without the use of IV contrast.     COMPARISON:  Left foot radiographs from the same date.     FINDINGS:  Osseous edema with cortical indistinctness and T1 hypointensity are seen involving the posterior aspect of the calcaneus consistent with osteomyelitis, noting overlying ulceration seen posteriorly in this region.  Achilles tendon is intact.  Remaining  visualized osseous structures show no significant osseous edema, fracture, or marrow replacement process.  No focal fluid collections seen.  There is posterior subcutaneous edema.  No significant ankle joint effusion.  Mild edema is also noted in Kager's fat pad.     Impression:     Osteomyelitis involving the posterior aspect of the calcaneus.           US Lower Extremity Arteries Bilateral  Order: [id number]  Status:  Final result   Visible to patient:  No (not released) Next appt:  None Dx:  PAD (peripheral artery disease)  Details    Reading Physician Reading Date Result Priority   Ayesha Lindo MD  [phone] 7/17/2020 STAT      Narrative & Impression     EXAMINATION:  US LOWER EXTREMITY ARTERIES BILATERAL     CLINICAL HISTORY:  Peripheral vascular disease, unspecified     TECHNIQUE:  Bilateral spectral, color and grayscale images of the large arteries of both lower extremities were performed.     COMPARISON:  CT lower extremity runoff from 01/03/2020.     FINDINGS:  Right lower extremity:     Common femoral artery: 128 cm/sec, biphasic     SFA proximal: 210 cm/sec, triphasic     SFA mid: 105 cm/sec, triphasic     SFA distal: 86 cm/sec, biphasic     Popliteal artery graft: Patent with velocity measuring 479 cm/sec at the proximal anastomosis, with velocities ranging from 49-54 cm/sec within the proximal to distal portions of the graft with monophasic waveform seen throughout.     Posterior tibial artery: 24 cm/sec     Anterior tibial artery: 55 cm/sec     Left lower extremity:     Common femoral artery to popliteal graft: Patent with velocity measuring 184 cm/sec at the proximal anastomosis with triphasic waveforms.  Monophasic waveforms are seen distally within the graft with velocities measuring 67 cm/sec proximally, 25 cm/sec midportion, and 53 cm/sec distally.     SFA proximal: 15 cm/sec, monophasic     SFA mid: 67 cm/sec, monophasic     SFA distal: 175 cm/sec, monophasic     Popliteal artery: 28  cm/sec, monophasic     Posterior tibial artery: 17 cm/sec     Anterior tibial artery: 57 cm/sec     Impression:     1. Patent right lower extremity popliteal artery bypass graft.  Elevated velocities seen at the proximal anastomosis measuring 479 cm/sec consistent with hemodynamically significant stenosis.  2. Patent left lower extremity common femoral artery to popliteal artery bypass graft.  Doubling of velocities seen within the mid to distal portion of the graft suggestive for hemodynamically significant stenosis.  3. Doubling of velocities with hemodynamically significant stenosis between the left popliteal artery and anterior tibial artery.                     Clinical Findings:  Left heel ulceration with probe to bone.    Assessment/Plan:     Active Diagnoses:    Diagnosis Date Noted POA    PRINCIPAL PROBLEM:  Acute osteomyelitis of left calcaneus [M86.172] 07/18/2020 Yes    Stage 3 chronic kidney disease [N18.3] 07/18/2020 Yes    Non healing left heel wound [S91.302A] 07/17/2020 Yes    S/P CABG x 1 [Z95.1] 01/27/2020 Not Applicable    Mixed hyperlipidemia [E78.2] 12/13/2019 Yes    Chronic combined systolic and diastolic heart failure [I50.42] 12/08/2019 Yes    Type 2 diabetes mellitus without complication, with long-term current use of insulin [E11.9, Z79.4] 05/05/2018 Not Applicable    Essential hypertension [I10] 05/05/2018 Yes    Peripheral artery disease [I73.9]  Yes      Problems Resolved During this Admission:       Discussed two options with patient. L BKA vs IV abx for six weeks with aggressive wound care for several months with no guarantee of wound resolution.  Patient would like to try IV abx, wound care at this time.     Procedure: After written consent, a sterile #15 blade was used to excisionally debride viable and non viable tissue on the plantar aspect of the Left foot.  Tissue debrided through epidermis, dermis, and subcutaneous tissue. Tissue excised included epidermis, dermis,  sucutaneous tissue, fibrin, biofilm and callus.  Wound copiously flushed with 50cc NS. Painted with betadine wrapped with DSD. Debridement limited 2/2 pain, 1mg morphine IV ordered and  administered by nursing.  Nursing orders placed for BID dressing changes.     Bone biopsy performed see procedure note below.     Vascular surgery consult placed.     ID consult placed to further tailor abx.     Pending vascular surgery reccs. Will plan for further debridement in OR this week.     Podiatry will follow.     Heel protector boots ordered.     Bone Biopsy    Written consent obtained from patient. Time out performed to verify correct site was identified prior to initiation of procedure.    The patient was lying on his bed in his room where 1mg morphine IV ordered. The patient was prepped and draped in the usual sterile fashion. A jamshidi needle was used to obtain bone from left calcaneus.  The specimen was sent to Pathology and for culture and sensitivity. Hemostasis was achieved with direct pressure. The site was covered with DSD    Attending: Rosio Mayes DPM  Assistant:none  Estimated blood loss: <5 mL  Specimen removed: Bone of left calcaneus.       Thank you for your consult. I will follow-up with patient. Please contact us if you have any additional questions.    Rosio Mayes DPM  Podiatry  [facility]

## 2020-07-18 NOTE — H&P
"[facility] Medicine  History & Physical    Patient Name: Suyapa Connelly  MRN: [id number]  Admission Date: 07/18/2020  Attending Physician: Aubrey Medrano MD, MPH      PCP:     Erlinda Rahman NP    CC:     Chief Complaint   Patient presents with    Blister     blister to left heel, "its very, very bad" pt is diabetic       HISTORY OF PRESENT ILLNESS:     Suyapa Connelly is a 54 y.o. female that (in part)  has a past medical history of Anxiety, Depression, Diabetes mellitus, GERD (gastroesophageal reflux disease), Hyperlipemia, Hypertension, and Myocardial infarction.  has a past surgical history that includes Angiogram - Right Extremity (Right, 7/9/15); angiogram-left leg (10/6/15); Catheterization of both left and right heart (N/A, 12/18/2019); Coronary angiography (N/A, 12/18/2019); Coronary Artery Bypass Graft (CABG) (N/A, 1/14/2020); and Insertion of tunneled central venous hemodialysis catheter (N/A, 1/27/2020). Presents to [facility] complaining of the left heel foot wound.  This is been there for several weeks with progressive worsening.  Poor healing.  Patient became concerned due to malodorous drainage.  Pain with ambulation and weight-bearing.  Associated hyperglycemia.  Denies fever chills.    In the emergency department physical exam concerning for osteomyelitis secondary to diabetic foot ulcer in the setting of uncontrolled diabetes.  Routine laboratory studies revealed hyperglycemia.  X-ray of the foot performed.  Concerning for osteomyelitis.  MRI confirmed this.  Podiatry was consulted.  Broad-spectrum antibiotics were initiated in the ED a special consideration given to risk of Pseudomonas infection.    Hospital medicine has been asked to admit to inpatient for further evaluation and treatment.       REVIEW OF SYSTEMS:     -- Constitutional: No fever or chills.  -- Eyes: No visual changes, diplopia, pain, tearing, blind " spots, or discharge.   -- Ears, nose, mouth, throat, and face: No congestion, sore throat, epistaxis, d/c, bleeding gums, neck stiffness masses, or dental issues.  -- Respiratory: No cough, shortness of breath, hemoptysis, stridor, wheezing, or night sweats.  -- Cardiovascular: No chest pain, HARTMAN, syncope, PND, edema, cyanosis, or palpitations.   -- Gastrointestinal: No vomiting, abdominal pain, hematemesis, melena, dyspepsia, or change in bowel habits.  -- Genitourinary: No hematuria, dysuria, frequency, urgency, nocturia, polyuria, stones, or incontinence.  -- Integument/breast:  As above in the HPI  -- Hematologic/lymphatic: No easy bruising or lymphadenopathy.   -- Musculoskeletal:  Left heel pain as above in the HPI.  Otherwise No acute arthralgias, acute myalgias, joint swelling, acute limitations of ROM, or acute muscular weakness.  -- Neurological: No seizures, headaches, incoordination, paraesthesias, ataxia, vertigo, or tremors.  -- Behavioral/Psych: No auditory or visual hallucinations, depression, or suicidal/homicidal ideations.  -- Endocrine:  Elevated blood glucose levels.  No heat or cold intolerance, polydipsia, or unintentional weight gain / loss.  -- Allergy/Immunologic: No recurrent infections or adverse reaction to food, insects, or difficulty breathing.      PAST MEDICAL / SURGICAL HISTORY:     Past Medical History:   Diagnosis Date    Anxiety     Depression     Diabetes mellitus     type 2    GERD (gastroesophageal reflux disease)     Hyperlipemia     Hypertension     Myocardial infarction 2010    minor-caused by stress per pt.     Past Surgical History:   Procedure Laterality Date    Angiogram - Right Extremity Right 7/9/15    angiogram-left leg  10/6/15    CATHETERIZATION OF BOTH LEFT AND RIGHT HEART N/A 12/18/2019    Procedure: CATHETERIZATION, HEART, BOTH LEFT AND RIGHT;  Surgeon: Que Fernando III, MD;  Location[facility] CATH LAB;  Service: Cardiology;  Laterality: N/A;     CORONARY ANGIOGRAPHY N/A 12/18/2019    Procedure: ANGIOGRAM, CORONARY ARTERY;  Surgeon: Que Fernando III, MD;  Location: [facility] CATH LAB;  Service: Cardiology;  Laterality: N/A;    CORONARY ARTERY BYPASS GRAFT (CABG) N/A 1/14/2020    Procedure: CORONARY ARTERY BYPASS GRAFT (CABG) x 1     Off Pump;  Surgeon: Huang Altamirano MD;  Location: [facility] OR [address] [place] [address];  Service: Cardiovascular;  Laterality: N/A;    INSERTION OF TUNNELED CENTRAL VENOUS HEMODIALYSIS CATHETER N/A 1/27/2020    Procedure: Insertion, Catheter, Central Venous, Hemodialysis;  Surgeon: ESTEBAN Gomez III, MD;  Location: [facility] CATH LAB;  Service: Peripheral Vascular;  Laterality: N/A;         FAMILY HISTORY:     Family History   Problem Relation Age of Onset    Anesthesia problems Neg Hx          SOCIAL HISTORY:     Social History     Socioeconomic History    Marital status: Legally      Spouse name: Not on file    Number of children: Not on file    Years of education: Not on file    Highest education level: Not on file   Occupational History    Not on file   Social Needs    Financial resource strain: Not on file    Food insecurity     Worry: Not on file     Inability: Not on file    Transportation needs     Medical: Not on file     Non-medical: Not on file   Tobacco Use    Smoking status: Never Smoker    Smokeless tobacco: Never Used   Substance and Sexual Activity    Alcohol use: No    Drug use: Yes     Types: Marijuana     Comment: used yesterday    Sexual activity: Not on file   Lifestyle    Physical activity     Days per week: Not on file     Minutes per session: Not on file    Stress: Not on file   Relationships    Social connections     Talks on phone: Not on file     Gets together: Not on file     Attends [employer] service: Not on file     Active member of club or organization: Not on file     Attends meetings of clubs or organizations: Not on file     Relationship status: Not on file   Other Topics Concern     Not on file   Social History Narrative    Not on file         ALLERGIES:       Review of patient's allergies indicates:   Allergen Reactions    Contrast media      Kidney injury    Pcn [penicillins]      Rash; tolerated ceftriaxone on 1/13/20         HOME MEDICATIONS:     Prior to Admission medications    Medication Sig Start Date End Date Taking? Authorizing Provider   acetaminophen (TYLENOL) 500 MG tablet Take 1,000 mg by mouth daily as needed for Pain.   Yes Historical Provider, MD   atorvastatin (LIPITOR) 80 MG tablet Take 1 tablet (80 mg total) by mouth once daily. 12/18/19 7/17/20 Yes Que Fernando III, MD   carvediloL (COREG) 6.25 MG tablet Take 6.25 mg by mouth once daily. Pt thinks she take BID. Unsure.   Yes Historical Provider, MD   clopidogreL (PLAVIX) 75 mg tablet Take 1 tablet (75 mg total) by mouth once daily. 2/5/20 2/4/21 Yes Genny Ortez NP   escitalopram oxalate (LEXAPRO) 10 MG tablet Take 1 tablet (10 mg total) by mouth once daily. 12/9/19 7/17/20 Yes Brittaney England MD   gabapentin (NEURONTIN) 300 MG capsule Take 1 capsule (300 mg total) by mouth 3 (three) times daily. 5/7/18 7/17/20 Yes Rosales Barton MD   insulin aspart U-100 (NOVOLOG) 100 unit/mL injection Inject 9 Units into the skin 3 (three) times daily with meals. 2/4/20 7/17/20 Yes Genny Ortez NP   insulin glargine 100 units/mL (3mL) SubQ pen Inject 10 Units into the skin every evening.  Patient taking differently: Inject 10 Units into the skin every evening. Lantus 2/4/20 8/2/20 Yes Genny Ortez NP   multivitamin (THERAGRAN) per tablet Take 1 tablet by mouth once daily.   Yes Historical Provider, MD   ondansetron (ZOFRAN-ODT) 4 MG TbDL Take 1 tablet (4 mg total) by mouth every 12 (twelve) hours as needed (vomiting). 3/24/20  Yes Aj Sams, PASahilC   sevelamer carbonate (RENVELA) 800 mg Tab Take 1 tablet (800 mg total) by mouth 3 (three) times daily with meals. 2/6/20 2/5/21 Yes Genny Ortez, NP    torsemide (DEMADEX) 20 MG Tab  3/10/20  Yes Historical Provider, MD   amiodarone (PACERONE) 400 MG tablet Take 1 tablet (400 mg total) by mouth once daily. 2/4/20 2/3/21  Genny Ortez NP   aspirin 81 MG Chew Take 1 tablet (81 mg total) by mouth once daily. 12/18/19 12/17/20  Que Fernando III, MD   blood sugar diagnostic (ONETOUCH ULTRA BLUE TEST STRIP) Strp Use to test blood glucose twice daily  Patient taking differently: Tests 3 times daily 12/9/19   Brittaney England MD   blood-glucose meter Misc Use to test blood glucose 12/9/19   Brittaney England MD   lancets 30 gauge Misc use to test blood glucose 2 (two) times daily with meals. 12/9/19   Brittaney England MD   lancing device Misc 1 Device by Misc.(Non-Drug; Combo Route) route 2 (two) times daily with meals. 5/7/18 5/7/18  Rosales Barton MD          [facility] MEDICATIONS:     Scheduled Meds:    amiodarone  400 mg Oral Daily    atorvastatin  80 mg Oral Daily    aztreonam  1,000 mg Intravenous Q8H    carvediloL  6.25 mg Oral Daily    ciprofloxacin  400 mg Intravenous Q18H    clopidogreL  75 mg Oral Daily    heparin (porcine)  5,000 Units Subcutaneous Q8H    insulin detemir U-100  15 Units Subcutaneous Daily    lorazepam  0.5 mg Intravenous ED 1 Time    pantoprazole  40 mg Intravenous Daily     Continuous Infusions:   PRN Meds: acetaminophen, acetaminophen, dextrose 50%, glucagon (human recombinant), insulin aspart U-100, melatonin, morphine, ondansetron, sodium chloride 0.9%, Pharmacy to dose Vancomycin consult **AND** vancomycin - pharmacy to dose      PHYSICAL EXAM:     Wt Readings from Last 1 Encounters:   07/17/20 2315 83 kg (182 lb 15.7 oz)   07/17/20 2006 83 kg (182 lb 15.7 oz)   07/17/20 1211 83 kg (183 lb)     Body mass index is 30.45 kg/m².  Vitals:    07/17/20 2254 07/17/20 2315 07/18/20 0230 07/18/20 0420   BP: (!) 160/85   (!) 150/77   BP Location: Right arm   Right arm   Patient Position: Lying   Lying  "  Pulse: 99   99   Resp: 17 16 17   Temp: 97.8 °F (36.6 °C)   98.3 °F (36.8 °C)   TempSrc: Oral   Oral   SpO2:    98%   Weight:  83 kg (182 lb 15.7 oz)     Height:  5' 5" (1.651 m)            -- General appearance: well developed. appears stated age   -- Head: normocephalic, atraumatic   -- Eyes: conjunctivae clear. Extraocular muscles intact  -- Nose: Nares normal. Septum midline.   -- Mouth/Throat: lips, mucosa, and tongue normal. no throat erythema.   -- Neck: NoJVD,  Supple, symmetrical, trachea midline, thyroid not grossly enlarged, appears symmetric  -- Lungs: clear to auscultation bilaterally. normal respiratory effort. No use of accessory muscles.   -- Chest wall: no tenderness. equal bilateral chest rise   -- Heart: regular rate and regular rhythm. S1, S2 normal.  no click, rub or gallop   -- Abdomen: soft, non-tender, non-distended, non-tympanic; bowel sounds normal; no masses  -- Extremities: no cyanosis, clubbing or edema.   -- Pulses: 2+ and symmetric   -- Skin:  Malodorous Diabetic foot ulcer over left calcaneus with fat pad exposure.  Concerning for osteomyelitis.  See 3 photos below  Turgor normal. Color normal. Texture normal.   -- Neurologic: Normal strength and tone. No focal numbness or weakness. CNII-XII intact. Carlos coma scale: eyes open spontaneously-4, oriented & converses-5, obeys commands-6.                          LABORATORY STUDIES:     Recent Results (from the past 36 hour(s))   POCT glucose    Collection Time: 07/17/20  1:02 PM   Result Value Ref Range    POCT Glucose 418 (H) 70 - 110 mg/dL   CBC auto differential    Collection Time: 07/17/20  4:31 PM   Result Value Ref Range    WBC 9.79 3.90 - 12.70 K/uL    RBC 3.81 (L) 4.00 - 5.40 M/uL    Hemoglobin 11.3 (L) 12.0 - 16.0 g/dL    Hematocrit 35.4 (L) 37.0 - 48.5 %    Mean Corpuscular Volume 93 82 - 98 fL    Mean Corpuscular Hemoglobin 29.7 27.0 - 31.0 pg    Mean Corpuscular Hemoglobin Conc 31.9 (L) 32.0 - 36.0 g/dL    RDW 14.7 (H) " 11.5 - 14.5 %    Platelets 370 (H) 150 - 350 K/uL    MPV 11.2 9.2 - 12.9 fL    Immature Granulocytes 0.4 0.0 - 0.5 %    Gran # (ANC) 7.5 1.8 - 7.7 K/uL    Immature Grans (Abs) 0.04 0.00 - 0.04 K/uL    Lymph # 1.6 1.0 - 4.8 K/uL    Mono # 0.5 0.3 - 1.0 K/uL    Eos # 0.0 0.0 - 0.5 K/uL    Baso # 0.06 0.00 - 0.20 K/uL    nRBC 0 0 /100 WBC    Gran% 76.5 (H) 38.0 - 73.0 %    Lymph% 16.8 (L) 18.0 - 48.0 %    Mono% 5.5 4.0 - 15.0 %    Eosinophil% 0.2 0.0 - 8.0 %    Basophil% 0.6 0.0 - 1.9 %    Differential Method Automated    Comprehensive metabolic panel    Collection Time: 07/17/20  4:31 PM   Result Value Ref Range    Sodium 130 (L) 136 - 145 mmol/L    Potassium 4.3 3.5 - 5.1 mmol/L    Chloride 100 95 - 110 mmol/L    CO2 18 (L) 23 - 29 mmol/L    Glucose 483 (HH) 70 - 110 mg/dL    BUN, Bld 38 (H) 6 - 20 mg/dL    Creatinine 2.5 (H) 0.5 - 1.4 mg/dL    Calcium 9.5 8.7 - 10.5 mg/dL    Total Protein 8.5 (H) 6.0 - 8.4 g/dL    Albumin 3.0 (L) 3.5 - 5.2 g/dL    Total Bilirubin 0.5 0.1 - 1.0 mg/dL    Alkaline Phosphatase 115 55 - 135 U/L    AST 11 10 - 40 U/L    ALT <5 (L) 10 - 44 U/L    Anion Gap 12 8 - 16 mmol/L    eGFR if African American 25 (A) >60 mL/min/1.73 m^2    eGFR if non African American 21 (A) >60 mL/min/1.73 m^2   Blood Culture #1 **CANNOT BE ORDERED STAT**    Collection Time: 07/17/20  4:31 PM    Specimen: Peripheral, Antecubital, Right; Blood   Result Value Ref Range    Blood Culture, Routine No Growth to date    Lactic acid, plasma    Collection Time: 07/17/20  4:31 PM   Result Value Ref Range    Lactate (Lactic Acid) 1.4 0.5 - 2.2 mmol/L   Magnesium    Collection Time: 07/17/20  4:31 PM   Result Value Ref Range    Magnesium 2.1 1.6 - 2.6 mg/dL   Sedimentation rate    Collection Time: 07/17/20  4:31 PM   Result Value Ref Range    Sed Rate 139 (H) 0 - 20 mm/Hr   C-reactive protein    Collection Time: 07/17/20  4:31 PM   Result Value Ref Range    CRP 9.2 (H) 0.0 - 8.2 mg/L   COVID-19 Rapid Screening    Collection  Time: 07/17/20  4:34 PM   Result Value Ref Range    SARS-CoV-2 RNA, Amplification, Qual Negative Negative   Blood Culture #2 **CANNOT BE ORDERED STAT**    Collection Time: 07/17/20  5:12 PM    Specimen: Peripheral, Hand, Left; Blood   Result Value Ref Range    Blood Culture, Routine No Growth to date    Beta - Hydroxybutyrate, Serum    Collection Time: 07/17/20  6:04 PM   Result Value Ref Range    Beta-Hydroxybutyrate 0.5 0.0 - 0.5 mmol/L   ISTAT PROCEDURE    Collection Time: 07/17/20  6:06 PM   Result Value Ref Range    POC PH 7.311 (L) 7.35 - 7.45    POC PCO2 42.5 35 - 45 mmHg    POC PO2 18 (LL) 40 - 60 mmHg    POC HCO3 21.4 (L) 24 - 28 mmol/L    POC BE -5 -2 to 2 mmol/L    POC SATURATED O2 24 (L) 95 - 100 %    POC TCO2 23 (L) 24 - 29 mmol/L    Sample VENOUS     Site Other     Allens Test N/A     DelSys Room Air    Urinalysis, Reflex to Urine Culture Urine, Clean Catch    Collection Time: 07/17/20  6:15 PM    Specimen: Urine   Result Value Ref Range    Specimen UA Urine, Clean Catch     Color, UA Yellow Yellow, Straw, Batsheva    Appearance, UA Cloudy (A) Clear    pH, UA 5.0 5.0 - 8.0    Specific Gravity, UA 1.020 1.005 - 1.030    Protein, UA 3+ (A) Negative    Glucose, UA 3+ (A) Negative    Ketones, UA Negative Negative    Bilirubin (UA) Negative Negative    Occult Blood UA 1+ (A) Negative    Nitrite, UA Negative Negative    Urobilinogen, UA Negative <2.0 EU/dL    Leukocytes, UA Trace (A) Negative   Urinalysis Microscopic    Collection Time: 07/17/20  6:15 PM   Result Value Ref Range    RBC, UA 0 0 - 4 /hpf    WBC, UA 3 0 - 5 /hpf    Bacteria Many (A) None-Occ /hpf    Yeast, UA None None    Squam Epithel, UA 30 /hpf    Hyaline Casts, UA 1 0-1/lpf /lpf    Microscopic Comment SEE COMMENT    POCT glucose    Collection Time: 07/17/20  7:01 PM   Result Value Ref Range    POCT Glucose 423 (H) 70 - 110 mg/dL   POCT glucose    Collection Time: 07/17/20 10:03 PM   Result Value Ref Range    POCT Glucose 238 (H) 70 - 110 mg/dL    CBC auto differential    Collection Time: 07/18/20  4:28 AM   Result Value Ref Range    WBC 8.98 3.90 - 12.70 K/uL    RBC 3.69 (L) 4.00 - 5.40 M/uL    Hemoglobin 11.4 (L) 12.0 - 16.0 g/dL    Hematocrit 35.0 (L) 37.0 - 48.5 %    Mean Corpuscular Volume 95 82 - 98 fL    Mean Corpuscular Hemoglobin 30.9 27.0 - 31.0 pg    Mean Corpuscular Hemoglobin Conc 32.6 32.0 - 36.0 g/dL    RDW 14.6 (H) 11.5 - 14.5 %    Platelets 349 150 - 350 K/uL    MPV 10.9 9.2 - 12.9 fL    Immature Granulocytes 0.3 0.0 - 0.5 %    Gran # (ANC) 6.0 1.8 - 7.7 K/uL    Immature Grans (Abs) 0.03 0.00 - 0.04 K/uL    Lymph # 2.1 1.0 - 4.8 K/uL    Mono # 0.7 0.3 - 1.0 K/uL    Eos # 0.1 0.0 - 0.5 K/uL    Baso # 0.05 0.00 - 0.20 K/uL    nRBC 0 0 /100 WBC    Gran% 66.6 38.0 - 73.0 %    Lymph% 23.2 18.0 - 48.0 %    Mono% 8.2 4.0 - 15.0 %    Eosinophil% 1.1 0.0 - 8.0 %    Basophil% 0.6 0.0 - 1.9 %    Differential Method Automated    Vancomycin, random    Collection Time: 07/18/20  4:28 AM   Result Value Ref Range    Vancomycin, Random 19.1 Not established ug/mL       Lab Results   Component Value Date    INR 1.2 02/04/2020    INR 1.3 (H) 02/03/2020    INR 1.5 (H) 02/02/2020     Lab Results   Component Value Date    HGBA1C 9.7 (H) 01/03/2020     Recent Labs     07/17/20  1302 07/17/20  1901 07/17/20  2203   POCTGLUCOSE 418* 423* 238*           MICROBIOLOGY DATA:     Urine Culture, Routine   Date Value Ref Range Status   06/01/2018 KLEBSIELLA PNEUMONIAE  >100,000 cfu/ml    Final       Microbiology x 7d:   Microbiology Results (last 7 days)     Procedure Component Value Units Date/Time    Blood Culture #2 **CANNOT BE ORDERED STAT** [[id number]] Collected: 07/17/20 1712    Order Status: Completed Specimen: Blood from Peripheral, Hand, Left Updated: 07/18/20 0312     Blood Culture, Routine No Growth to date    Blood Culture #1 **CANNOT BE ORDERED STAT** [[id number]] Collected: 07/17/20 1631    Order Status: Completed Specimen: Blood from Peripheral, Antecubital,  Right Updated: 07/18/20 0312     Blood Culture, Routine No Growth to date            IMAGING:     Imaging Results          US Retroperitoneal Complete (Final result)  Result time 07/17/20 22:24:55   Procedure changed from US Abdomen Pelvis Doppler Study Limited (retroperitoneal)     Final result by Jeff Quiroga MD (07/17/20 22:24:55)                 Impression:      No acute sonographic abnormality.      Electronically signed by: Jeff Quiroga  Date:    07/17/2020  Time:    22:24             Narrative:    EXAMINATION:  US RETROPERITONEAL COMPLETE    CLINICAL HISTORY:  ROSLYN (Acute Kidney Injury); Acute kidney failure, unspecified    TECHNIQUE:  Ultrasound of the kidneys and urinary bladder was performed including color flow and Doppler evaluation of the kidneys.    COMPARISON:  None.    FINDINGS:  Right kidney: The right kidney measures 10.5 cm. No cortical thinning. No loss of corticomedullary distinction. Resistive index measures 0.71.  No mass. No renal stone. No hydronephrosis.    Left kidney: The left kidney measures 11.7 cm. No cortical thinning. No loss of corticomedullary distinction. Resistive index measures 0.74.  No mass. No renal stone. No hydronephrosis.    The bladder is partially distended at the time of scanning and has an unremarkable appearance.                               US Lower Extremity Arteries Bilateral (Final result)  Result time 07/17/20 22:31:22    Final result by Ayesha Lindo MD (07/17/20 22:31:22)                 Impression:      1. Patent right lower extremity popliteal artery bypass graft.  Elevated velocities seen at the proximal anastomosis measuring 479 cm/sec consistent with hemodynamically significant stenosis.  2. Patent left lower extremity common femoral artery to popliteal artery bypass graft.  Doubling of velocities seen within the mid to distal portion of the graft suggestive for hemodynamically significant stenosis.  3. Doubling of velocities with hemodynamically  significant stenosis between the left popliteal artery and anterior tibial artery.      Electronically signed by: Ayesha Lindo MD  Date:    07/17/2020  Time:    22:31             Narrative:    EXAMINATION:  US LOWER EXTREMITY ARTERIES BILATERAL    CLINICAL HISTORY:  Peripheral vascular disease, unspecified    TECHNIQUE:  Bilateral spectral, color and grayscale images of the large arteries of both lower extremities were performed.    COMPARISON:  CT lower extremity runoff from 01/03/2020.    FINDINGS:  Right lower extremity:    Common femoral artery: 128 cm/sec, biphasic    SFA proximal: 210 cm/sec, triphasic    SFA mid: 105 cm/sec, triphasic    SFA distal: 86 cm/sec, biphasic    Popliteal artery graft: Patent with velocity measuring 479 cm/sec at the proximal anastomosis, with velocities ranging from 49-54 cm/sec within the proximal to distal portions of the graft with monophasic waveform seen throughout.    Posterior tibial artery: 24 cm/sec    Anterior tibial artery: 55 cm/sec    Left lower extremity:    Common femoral artery to popliteal graft: Patent with velocity measuring 184 cm/sec at the proximal anastomosis with triphasic waveforms.  Monophasic waveforms are seen distally within the graft with velocities measuring 67 cm/sec proximally, 25 cm/sec midportion, and 53 cm/sec distally.    SFA proximal: 15 cm/sec, monophasic    SFA mid: 67 cm/sec, monophasic    SFA distal: 175 cm/sec, monophasic    Popliteal artery: 28 cm/sec, monophasic    Posterior tibial artery: 17 cm/sec    Anterior tibial artery: 57 cm/sec                               MRI Foot (Hindfoot) Left Without Contrast (Final result)  Result time 07/17/20 20:23:27    Final result by Ayesha Lindo MD (07/17/20 20:23:27)                 Impression:      Osteomyelitis involving the posterior aspect of the calcaneus.      Electronically signed by: Ayesha Lindo MD  Date:    07/17/2020  Time:    20:23             Narrative:    EXAMINATION:  MRI  FOOT (HINDFOOT) LEFT WITHOUT CONTRAST    CLINICAL HISTORY:  Osteomyelitis suspected, diabetic;calcaneus osteo;    TECHNIQUE:  Multiplanar, multisequence images were obtained of the left hind foot without the use of IV contrast.    COMPARISON:  Left foot radiographs from the same date.    FINDINGS:  Osseous edema with cortical indistinctness and T1 hypointensity are seen involving the posterior aspect of the calcaneus consistent with osteomyelitis, noting overlying ulceration seen posteriorly in this region.  Achilles tendon is intact.  Remaining visualized osseous structures show no significant osseous edema, fracture, or marrow replacement process.  No focal fluid collections seen.  There is posterior subcutaneous edema.  No significant ankle joint effusion.  Mild edema is also noted in Kager's fat pad.                               X-Ray Chest 1 View (Final result)  Result time 07/17/20 16:49:37    Final result by Miguel Hubbard MD (07/17/20 16:49:37)                 Impression:      1. No acute cardiopulmonary process appreciated.      Electronically signed by: Miguel Hubbard  Date:    07/17/2020  Time:    16:49             Narrative:    EXAMINATION:  XR CHEST 1 VIEW    CLINICAL HISTORY:  Unspecified open wound, left foot, initial encounter    TECHNIQUE:  Single PA view of the chest    COMPARISON:  Chest radiograph 03/23/2020    FINDINGS:  Postsurgical changes compatible with prior CABG, not significantly changed.  Interval removal of left internal jugular vein approach tunneled HD catheter.    Cardiomediastinal silhouette is within normal limits.    Thin bibasilar curvilinear airspace opacities which may reflect scarring versus chronic atelectasis, improved.  Otherwise, no focal consolidation, overt interstitial edema, sizable pleural effusion or pneumothorax.    Multilevel degenerative changes of the imaged spine.                               X-Ray Foot Complete Left (Final result)  Result time 07/17/20  16:17:27    Final result by Solomon Jauregui DO (07/17/20 16:17:27)                 Impression:      Please see above      Electronically signed by: Solomon Jauregui DO  Date:    07/17/2020  Time:    16:17             Narrative:    EXAMINATION:  XR FOOT COMPLETE 3 VIEW LEFT    CLINICAL HISTORY:  .  Unspecified open wound, left foot, initial encounter    TECHNIQUE:  AP, lateral and oblique views of the left foot were performed.    COMPARISON:  None    FINDINGS:  No evidence for acute fracture line or dislocation of the left foot.  Prominent mixed density opacity overlies the 1st distal phalanx with radiopaque and soft tissue density overall indeterminate.  Clinical correlation advised.    There is a large lucency projected over the calcaneal soft tissues concerning for possible ulceration.  No definite bony erosion of the underlying calcaneus.  Clinical correlation and further evaluation with MRI as warranted if patient compatible.  There is prominent vascular calcifications within the soft tissues.                                  CONSULTS:     PHARMACY TO DOSE VANCOMYCIN CONSULT  WOUND CARE CONSULT  IP CONSULT TO PODIATRY       ASSESSMENT & PLAN:     Primary Diagnosis:  Acute osteomyelitis of left calcaneus    Active Hospital Problems    Diagnosis  POA    *Acute osteomyelitis of left calcaneus [M86.172]  Yes     Priority: 1 - High    Non healing left heel wound [S91.302A]  Yes    S/P CABG x 1 [Z95.1]  Not Applicable    Mixed hyperlipidemia [E78.2]  Yes    Chronic combined systolic and diastolic heart failure [I50.42]  Yes    Type 2 diabetes mellitus without complication, with long-term current use of insulin [E11.9, Z79.4]  Not Applicable    Essential hypertension [I10]  Yes    Peripheral artery disease [I73.9]  Yes      Resolved Hospital Problems   No resolved problems to display.       Left calcaneus osteomyelitis secondary to nonhealing left diabetic foot ulcer  · As evidenced by history, physical exam,  imaging, and elevated CRP/ESR  · Acute exacerbation of a chronic issue. Previous infection with diabetic foot ulcer. May be polymicrobial given the location and nature of the infection.  · Blood cultures will likely be of low yield.    · Ideally a bone biopsy would be obtained prior to initiating antibiotics, however she was given empiric antibiotics in the ED so we will continue.  · Will tailor antibiotic regimen according to culture & sensitivity results, should a bone biopsy be performed  · Maintain euvolemic status with IV fluids  · Consider MRI for further evaluation of extent of bone infection  · Consider consulting infectious disease for further inpatient as well as outpatient follow-up  · Podiatry consulted     Uncontrolled Diabetes Mellitus Type 2  · Blood glucose is not in acceptable range at this time; insulin given  · Maintain w/ subcutaneous insulin management order set  · Hold oral diabetic meds  · ADA 1800 kcal diet  · BG goal while in patient is <180mg/dL  · HgA1c = Pending    Hyperlipidemia  · Lipid panel - as an outpatient  · Cardiac diet  · Continue statin    Hypertension  · Goal while inpatient is a systolic blood pressure less than 160mmHg  · BP in acceptable range at this time  · Continue current home regimen with hold parameters  · PRN antihypertensives available    Coronary artery disease status post CABG x1 vessel  · No evidence of acute coronary syndrome at this time  · Maintain adequate blood pressure control  · Heart healthy diet  · Aspirin  · Statin    Compensated systolic and diastolic CHF   · Evidenced by history,   · No evidence of pulmonary edema, peripheral edema  · BNP pending  · Provide diuresis w/ by mouth medication  · Maintain w/ beta-blocker  · ACE inhibitor or ARB if GFR allows and remains stable  · Daily Weights  · Strict I/O  · Low-sodium cardiac diet  · Chest X-ray  · Check TSH, albumin, UA, and renal function  · Obtain 2D echo if <6 months   No results found for:  EF  · EKG and cardiac enzymes PRN  · DVT prophylaxis w/ pharmacological and/or mechanical measures  · Oxygen supplementation support PRN    Instructions given to patient/family:  Monitor daily weight.  Regular activity within patient's limitations.  Low salt, low fat and low choleterol diet and restrict fluid < 2L per day.  Call MD if SOB, chest pain, weight gain > 2-3 lbs per day and/or 5-6 lbs per week.   No smoking. Annual influenza vaccine required.          VTE Risk Mitigation (From admission, onward)         Ordered     heparin (porcine) injection 5,000 Units  Every 8 hours      07/17/20 2217     IP VTE HIGH RISK PATIENT  Once      07/17/20 2217     Place sequential compression device  Until discontinued      07/17/20 2217     Place HAILEY hose  Until discontinued      07/17/20 2217                  Adult PRN medications available   DVT prophylaxis given       DISPOSITION:     Will admit to the Hospital Medicine service for further evaluation and treatment.    Chart reviewed and updated where applicable.    High Risk Conditions:  Patient has a condition that poses threat to life and bodily function:  Acute osteomyelitis of left calcaneus      ===============================================================    Aubrey Medrano MD, MPH  Department of Hospital Medicine   [facility]  [phone]  (7pm - 6am)          This note is dictated using [employer] voice recognition software.  There are word recognition mistakes that are occasionally missed on review.

## 2020-07-18 NOTE — PLAN OF CARE
SW met with patient to complete discharge needs assessment. Patient was able to verbalize her help at home is daughter, Cornelia Connelly.  SW discussed with patient the things she's responsible for to manage her health at home would be by going on her doctor appointments, taking medications as prescribed, and getting prescriptions filled. Patient prefers has no preference when attending appointments.     Erlinda Rahman NP     Extended Emergency Contact Information  Primary Emergency Contact: JonelleRejidaisy   [place]  Mobile Phone: [phone]  Relation: Mother  Secondary Emergency Contact: Cornelia Connelly  Address: [facility] [place] [address] [facility]  Home Phone: [phone]  Relation: Daughter       [id number] - [place], [place] - [address] [place] [address] [facility] [address]  [facility] [place] [address]  Phone: [phone] Fax: [phone]        07/18/20 1409   Discharge Assessment   Assessment Type Discharge Planning Assessment   Confirmed/corrected address and phone number on facesheet? Yes   Assessment information obtained from? Patient   Prior to hospitilization cognitive status: Alert/Oriented   Prior to hospitalization functional status: Assistive Equipment;Needs Assistance   Current cognitive status: Alert/Oriented   Current Functional Status: Assistive Equipment;Needs Assistance   Facility Arrived From: Pt transported to hospital.   Lives With parent(s)   Able to Return to Prior Arrangements yes   Is patient able to care for self after discharge? Yes   Who are your caregiver(s) and their phone number(s)? Cornelia Connelly, daughter [phone]   Patient's perception of discharge disposition home or selfcare   Readmission Within the Last 30 Days no previous admission in last 30 days   Patient currently being followed by outpatient case management? No   Patient currently receives any other outside agency  services? No   Equipment Currently Used at Home wheelchair   Do you have any problems affording any of your prescribed medications? No   Is the patient taking medications as prescribed? yes   Does the patient have transportation home? Yes   Transportation Anticipated family or friend will provide   Dialysis Name and Scheduled days Not Applicable.   Does the patient receive services at the Coumadin Clinic? No   Discharge Plan A Home with family   DME Needed Upon Discharge  cane, straight   Patient/Family in Agreement with Plan yes

## 2020-07-18 NOTE — NURSING
Loss IV access rt forearm, pt also having severe itching. Poss reaction to cipro, MD notified, New orders recvd

## 2020-07-18 NOTE — SUBJECTIVE & OBJECTIVE
Interval History: No new issues     Review of Systems   Constitutional: Negative for activity change.   HENT: Negative for congestion.    Respiratory: Negative for chest tightness and shortness of breath.    Cardiovascular: Negative for chest pain.   Gastrointestinal: Negative for abdominal pain.   Genitourinary: Negative for difficulty urinating.     Objective:     Vital Signs (Most Recent):  Temp: 97.8 °F (36.6 °C) (07/18/20 1057)  Pulse: 95 (07/18/20 1057)  Resp: 18 (07/18/20 1057)  BP: (!) 113/59 (07/18/20 1057)  SpO2: 97 % (07/18/20 1057) Vital Signs (24h Range):  Temp:  [97.8 °F (36.6 °C)-98.3 °F (36.8 °C)] 97.8 °F (36.6 °C)  Pulse:  [] 95  Resp:  [16-45] 18  SpO2:  [93 %-98 %] 97 %  BP: (113-160)/(58-89) 113/59     Weight: 83 kg (182 lb 15.7 oz)  Body mass index is 30.45 kg/m².    Intake/Output Summary (Last 24 hours) at 7/18/2020 1105  Last data filed at 7/18/2020 0600  Gross per 24 hour   Intake 250 ml   Output --   Net 250 ml      Physical Exam  Vitals signs and nursing note reviewed.   Constitutional:       Appearance: She is obese. She is not ill-appearing or toxic-appearing.   HENT:      Head: Normocephalic and atraumatic.   Pulmonary:      Effort: No respiratory distress.   Neurological:      Mental Status: She is alert and oriented to person, place, and time.   Psychiatric:         Mood and Affect: Mood normal.         Behavior: Behavior normal.         Significant Labs:   BMP:   Recent Labs   Lab 07/17/20  1631 07/18/20  0428   * 211*   * 132*   K 4.3 4.4    104   CO2 18* 17*   BUN 38* 37*   CREATININE 2.5* 2.0*   CALCIUM 9.5 9.5   MG 2.1  --      CBC:   Recent Labs   Lab 07/17/20  1631 07/18/20  0428   WBC 9.79 8.98   HGB 11.3* 11.4*   HCT 35.4* 35.0*   * 349       Significant Imaging:

## 2020-07-18 NOTE — PLAN OF CARE
Problem: Wound  Goal: Optimal Wound Healing  Outcome: Ongoing, Progressing     Problem: Fall Injury Risk  Goal: Absence of Fall and Fall-Related Injury  Outcome: Ongoing, Progressing     Problem: Adult Inpatient Plan of Care  Goal: Plan of Care Review  Outcome: Ongoing, Progressing     Problem: Diabetes Comorbidity  Goal: Blood Glucose Level Within Desired Range  Outcome: Ongoing, Progressing     Problem: Electrolyte Imbalance (Acute Kidney Injury/Impairment)  Goal: Serum Electrolyte Balance  Outcome: Ongoing, Progressing     Problem: Fluid Imbalance (Acute Kidney Injury/Impairment)  Goal: Optimal Fluid Balance  Outcome: Ongoing, Progressing     Problem: Hematologic Alteration (Acute Kidney Injury/Impairment)  Goal: Hemoglobin, Hematocrit and Platelets Within Normal Range  Outcome: Ongoing, Progressing     Problem: Oral Intake Inadequate (Acute Kidney Injury/Impairment)  Goal: Optimal Nutrition Intake  Outcome: Ongoing, Progressing     Problem: Renal Function Impairment (Acute Kidney Injury/Impairment)  Goal: Effective Renal Function  Outcome: Ongoing, Progressing

## 2020-07-18 NOTE — PROGRESS NOTES
Pharmacokinetic Assessment Follow Up: IV Vancomycin    Vancomycin serum concentration assessment(s):    The random level was drawn correctly and can be used to guide therapy at this time. The measurement is within the desired definitive target range of 10 to 20 mcg/mL.    Vancomycin Regimen Plan:  Dose 1000 mg today 7/18    Re-dose when the random level is less than 20 mcg/mL, next level to be drawn at 06:00 on 7/19    Drug levels (last 3 results):  Recent Labs   Lab Result Units 07/18/20  0428   Vancomycin, Random ug/mL 19.1       Pharmacy will continue to follow and monitor vancomycin.    Please contact pharmacy at extension [phone] for questions regarding this assessment.    Thank you for the consult,   Evelyn Morris       Patient brief summary:  Suyapa Connelly is a 54 y.o. female initiated on antimicrobial therapy with IV Vancomycin for treatment of skin & soft tissue infection    Drug Allergies:   Review of patient's allergies indicates:   Allergen Reactions    Contrast media      Kidney injury    Pcn [penicillins]      Rash; tolerated ceftriaxone on 1/13/20       Actual Body Weight:   83 kg    Renal Function:   Estimated Creatinine Clearance: 34.6 mL/min (A) (based on SCr of 2 mg/dL (H)).,     Dialysis Method (if applicable):  N/A    CBC (last 72 hours):  Recent Labs   Lab Result Units 07/17/20  1631 07/18/20  0428   WBC K/uL 9.79 8.98   Hemoglobin g/dL 11.3* 11.4*   Hematocrit % 35.4* 35.0*   Platelets K/uL 370* 349   Gran% % 76.5* 66.6   Lymph% % 16.8* 23.2   Mono% % 5.5 8.2   Eosinophil% % 0.2 1.1   Basophil% % 0.6 0.6   Differential Method  Automated Automated       Metabolic Panel (last 72 hours):  Recent Labs   Lab Result Units 07/17/20  1631 07/17/20  1815 07/18/20  0428   Sodium mmol/L 130*  --  132*   Potassium mmol/L 4.3  --  4.4   Chloride mmol/L 100  --  104   CO2 mmol/L 18*  --  17*   Glucose mg/dL 483*  --  211*   Glucose, UA   --  3+*  --    BUN, Bld mg/dL 38*  --  37*   Creatinine mg/dL 2.5*  --   2.0*   Albumin g/dL 3.0*  --   --    Total Bilirubin mg/dL 0.5  --   --    Alkaline Phosphatase U/L 115  --   --    AST U/L 11  --   --    ALT U/L <5*  --   --    Magnesium mg/dL 2.1  --   --        Vancomycin Administrations:  vancomycin given in the last 96 hours                     vancomycin 1.25 g in dextrose 5% 250 mL IVPB (ready to mix) ()  Restarted 07/18/20 0042     1,250 mg New Bag 07/17/20 2208                    Microbiologic Results:  Microbiology Results (last 7 days)       Procedure Component Value Units Date/Time    Aerobic culture [[id number]] Collected: 07/18/20 0929    Order Status: Sent Specimen: Bone from Foot, Left Updated: 07/18/20 0929    Gram stain [[id number]] Collected: 07/18/20 0929    Order Status: Sent Specimen: Bone from Foot, Left Updated: 07/18/20 0929    Blood Culture #2 **CANNOT BE ORDERED STAT** [[id number]] Collected: 07/17/20 1712    Order Status: Completed Specimen: Blood from Peripheral, Hand, Left Updated: 07/18/20 0312     Blood Culture, Routine No Growth to date    Blood Culture #1 **CANNOT BE ORDERED STAT** [[id number]] Collected: 07/17/20 1631    Order Status: Completed Specimen: Blood from Peripheral, Antecubital, Right Updated: 07/18/20 0312     Blood Culture, Routine No Growth to date

## 2020-07-18 NOTE — PROGRESS NOTES
Pharmacokinetic Initial Assessment: IV Vancomycin    Assessment/Plan:    Initiate intravenous vancomycin with loading dose of 1250 mg once with subsequent doses when random concentrations are less than 20 mcg/mL  Desired empiric serum trough concentration is 10 to 20 mcg/mL  Draw vancomycin random level on 7/18/20 at 06:00.  Pharmacy will continue to follow and monitor vancomycin.      Please contact pharmacy at extension [phone] with any questions regarding this assessment.     Thank you for the consult,   Ada Mora       Patient brief summary:  Suyapa Connelly is a 54 y.o. female initiated on antimicrobial therapy with IV Vancomycin for treatment of suspected skin & soft tissue infection    Drug Allergies:   Review of patient's allergies indicates:   Allergen Reactions    Contrast media      Kidney injury    Pcn [penicillins]      Rash; tolerated ceftriaxone on 1/13/20       Actual Body Weight:   83 kg    Renal Function:   Estimated Creatinine Clearance: 27.7 mL/min (A) (based on SCr of 2.5 mg/dL (H)).,     Dialysis Method (if applicable):  N/A    CBC (last 72 hours):  Recent Labs   Lab Result Units 07/17/20  1631   WBC K/uL 9.79   Hemoglobin g/dL 11.3*   Hematocrit % 35.4*   Platelets K/uL 370*   Gran% % 76.5*   Lymph% % 16.8*   Mono% % 5.5   Eosinophil% % 0.2   Basophil% % 0.6   Differential Method  Automated       Metabolic Panel (last 72 hours):  Recent Labs   Lab Result Units 07/17/20  1631 07/17/20  1815   Sodium mmol/L 130*  --    Potassium mmol/L 4.3  --    Chloride mmol/L 100  --    CO2 mmol/L 18*  --    Glucose mg/dL 483*  --    Glucose, UA   --  3+*   BUN, Bld mg/dL 38*  --    Creatinine mg/dL 2.5*  --    Albumin g/dL 3.0*  --    Total Bilirubin mg/dL 0.5  --    Alkaline Phosphatase U/L 115  --    AST U/L 11  --    ALT U/L <5*  --    Magnesium mg/dL 2.1  --        Drug levels (last 3 results):  No results for input(s): VANCOMYCINRA, VANCOMYCINPE, VANCOMYCINTR in the last 72 hours.    Microbiologic  Results:  Microbiology Results (last 7 days)       Procedure Component Value Units Date/Time    Blood Culture #2 **CANNOT BE ORDERED STAT** [[id number]] Collected: 07/17/20 1712    Order Status: Sent Specimen: Blood from Peripheral, Hand, Left Updated: 07/17/20 1724    Blood Culture #1 **CANNOT BE ORDERED STAT** [[id number]] Collected: 07/17/20 1631    Order Status: Sent Specimen: Blood from Peripheral, Antecubital, Right Updated: 07/17/20 1633

## 2020-07-18 NOTE — HPI
Suyapa Connelly is a 54 y.o. female that (in part)  has a past medical history of Anxiety, Depression, Diabetes mellitus, GERD (gastroesophageal reflux disease), Hyperlipemia, Hypertension, and Myocardial infarction. Patient has a past surgical history that includes Angiogram - Right Extremity (Right, 7/9/15); angiogram-left leg (10/6/15); Catheterization of both left and right heart (N/A, 12/18/2019); Coronary angiography (N/A, 12/18/2019); Coronary Artery Bypass Graft (CABG) (N/A, 1/14/2020); and Insertion of tunneled central venous hemodialysis catheter (N/A, 1/27/2020). Presents to [facility] Emergency Department complaining of the left worsening heel foot wound. Poor wound healing.  Patient became concerned due to malodorous drainage.  Pain with ambulation and weight-bearing.  Associated hyperglycemia.  Denies fever chills.    In the emergency department physical exam concerning for osteomyelitis secondary to diabetic foot ulcer in the setting of uncontrolled diabetes.  Routine laboratory studies revealed hyperglycemia.  X-ray of the foot performed.  Concerning for osteomyelitis.  MRI confirmed this.  Podiatry was consulted.  Broad-spectrum antibiotics were initiated in the ED a special consideration given to risk of Pseudomonas infection.    Hospital medicine has been asked to admit to inpatient for further evaluation and treatment.

## 2020-07-18 NOTE — PROGRESS NOTES
[facility] Hospital Medicine  Progress Note    Patient Name: Suyapa Connelly  MRN: [id number]  Patient Class: IP- Inpatient   Admission Date: 7/17/2020  Length of Stay: 1 days  Attending Physician: Hitesh Mason MD  Primary Care Provider: Erlinda Rahman NP        Subjective:     Principal Problem:Acute osteomyelitis of left calcaneus        HPI:  Suyapa Connelly is a 54 y.o. female that (in part)  has a past medical history of Anxiety, Depression, Diabetes mellitus, GERD (gastroesophageal reflux disease), Hyperlipemia, Hypertension, and Myocardial infarction.  has a past surgical history that includes Angiogram - Right Extremity (Right, 7/9/15); angiogram-left leg (10/6/15); Catheterization of both left and right heart (N/A, 12/18/2019); Coronary angiography (N/A, 12/18/2019); Coronary Artery Bypass Graft (CABG) (N/A, 1/14/2020); and Insertion of tunneled central venous hemodialysis catheter (N/A, 1/27/2020). Presents to [facility] Emergency Department complaining of the left heel foot wound.  This is been there for several weeks with progressive worsening.  Poor healing.  Patient became concerned due to malodorous drainage.  Pain with ambulation and weight-bearing.  Associated hyperglycemia.  Denies fever chills.    In the emergency department physical exam concerning for osteomyelitis secondary to diabetic foot ulcer in the setting of uncontrolled diabetes.  Routine laboratory studies revealed hyperglycemia.  X-ray of the foot performed.  Concerning for osteomyelitis.  MRI confirmed this.  Podiatry was consulted.  Broad-spectrum antibiotics were initiated in the ED a special consideration given to risk of Pseudomonas infection.    Hospital medicine has been asked to admit to inpatient for further evaluation and treatment.     Overview/Hospital Course:  Suyapa Connelly is a 54 y.o. female that (in part)  has a past medical history of Anxiety, Depression, Diabetes  mellitus, GERD (gastroesophageal reflux disease), Hyperlipemia, Hypertension, and Myocardial infarction.  has a past surgical history that includes Angiogram - Right Extremity (Right, 7/9/15); angiogram-left leg (10/6/15); Catheterization of both left and right heart (N/A, 12/18/2019); Coronary angiography (N/A, 12/18/2019); Coronary Artery Bypass Graft (CABG) (N/A, 1/14/2020); and Insertion of tunneled central venous hemodialysis catheter (N/A, 1/27/2020). Presents to [facility] Emergency Department complaining of the left heel foot wound.  MRI confirmed osteo. Podiatry, ID, and vascular were consulted.  Podiatry discussed options with patient. She has chosen 6 weeks of IV abx. ID has been consulted to tailor abx. Blood cultures were NG     Interval History: No new issues     Review of Systems   Constitutional: Negative for activity change.   HENT: Negative for congestion.    Respiratory: Negative for chest tightness and shortness of breath.    Cardiovascular: Negative for chest pain.   Gastrointestinal: Negative for abdominal pain.   Genitourinary: Negative for difficulty urinating.     Objective:     Vital Signs (Most Recent):  Temp: 97.8 °F (36.6 °C) (07/18/20 1057)  Pulse: 95 (07/18/20 1057)  Resp: 18 (07/18/20 1057)  BP: (!) 113/59 (07/18/20 1057)  SpO2: 97 % (07/18/20 1057) Vital Signs (24h Range):  Temp:  [97.8 °F (36.6 °C)-98.3 °F (36.8 °C)] 97.8 °F (36.6 °C)  Pulse:  [] 95  Resp:  [16-45] 18  SpO2:  [93 %-98 %] 97 %  BP: (113-160)/(58-89) 113/59     Weight: 83 kg (182 lb 15.7 oz)  Body mass index is 30.45 kg/m².    Intake/Output Summary (Last 24 hours) at 7/18/2020 1105  Last data filed at 7/18/2020 0600  Gross per 24 hour   Intake 250 ml   Output --   Net 250 ml      Physical Exam  Vitals signs and nursing note reviewed.   Constitutional:       Appearance: She is obese. She is not ill-appearing or toxic-appearing.   HENT:      Head: Normocephalic and atraumatic.   Pulmonary:       Effort: No respiratory distress.   Neurological:      Mental Status: She is alert and oriented to person, place, and time.   Psychiatric:         Mood and Affect: Mood normal.         Behavior: Behavior normal.         Significant Labs:   BMP:   Recent Labs   Lab 07/17/20  1631 07/18/20  0428   * 211*   * 132*   K 4.3 4.4    104   CO2 18* 17*   BUN 38* 37*   CREATININE 2.5* 2.0*   CALCIUM 9.5 9.5   MG 2.1  --      CBC:   Recent Labs   Lab 07/17/20  1631 07/18/20  0428   WBC 9.79 8.98   HGB 11.3* 11.4*   HCT 35.4* 35.0*   * 349       Significant Imaging:       Assessment/Plan:      * Acute osteomyelitis of left calcaneus  MRI confirms osteo. Bone Bx. IV abx.  6 weeks with H/H on discharge later this week. Follow podiatry recs.         Stage 3 chronic kidney disease  No acute issues  Stable.       Non healing left heel wound  As below       S/P CABG x 1  No acute issues       Mixed hyperlipidemia  Resume home meds       Chronic combined systolic and diastolic heart failure  No acute issues   EF 50% on recent echo       Essential hypertension  Resumed home meds       Type 2 diabetes mellitus without complication, with long-term current use of insulin  Uncontrolled. Manifestations likely of CKD 3    Will check an A1c       Peripheral artery disease  Extensive history.  Vascular has been consulted       Obesity Body mass index is 30.45 kg/m².  Weight loss as out patient     VTE Risk Mitigation (From admission, onward)         Ordered     heparin (porcine) injection 5,000 Units  Every 8 hours      07/17/20 2217     IP VTE HIGH RISK PATIENT  Once      07/17/20 2217     Place sequential compression device  Until discontinued      07/17/20 2217     Place HAILEY hose  Until discontinued      07/17/20 2217                Continue Abx. Podiatry, ID and vascular consulted. Home with H/H later this week         Hitesh Barrow MD  Department of Hospital Medicine   [facility]

## 2020-07-19 LAB
ANION GAP SERPL CALC-SCNC: 9 MMOL/L (ref 8–16)
BASOPHILS # BLD AUTO: 0.06 K/UL (ref 0–0.2)
BASOPHILS NFR BLD: 0.8 % (ref 0–1.9)
BUN SERPL-MCNC: 39 MG/DL (ref 6–20)
CALCIUM SERPL-MCNC: 9 MG/DL (ref 8.7–10.5)
CHLORIDE SERPL-SCNC: 105 MMOL/L (ref 95–110)
CO2 SERPL-SCNC: 18 MMOL/L (ref 23–29)
CREAT SERPL-MCNC: 2.2 MG/DL (ref 0.5–1.4)
DIFFERENTIAL METHOD: ABNORMAL
EOSINOPHIL # BLD AUTO: 0.2 K/UL (ref 0–0.5)
EOSINOPHIL NFR BLD: 1.9 % (ref 0–8)
ERYTHROCYTE [DISTWIDTH] IN BLOOD BY AUTOMATED COUNT: 14.6 % (ref 11.5–14.5)
EST. GFR  (AFRICAN AMERICAN): 29 ML/MIN/1.73 M^2
EST. GFR  (NON AFRICAN AMERICAN): 25 ML/MIN/1.73 M^2
GLUCOSE SERPL-MCNC: 114 MG/DL (ref 70–110)
GRAM STN SPEC: NORMAL
GRAM STN SPEC: NORMAL
HCT VFR BLD AUTO: 34.1 % (ref 37–48.5)
HGB BLD-MCNC: 11 G/DL (ref 12–16)
IMM GRANULOCYTES # BLD AUTO: 0.01 K/UL (ref 0–0.04)
IMM GRANULOCYTES NFR BLD AUTO: 0.1 % (ref 0–0.5)
LYMPHOCYTES # BLD AUTO: 2.5 K/UL (ref 1–4.8)
LYMPHOCYTES NFR BLD: 31.9 % (ref 18–48)
MCH RBC QN AUTO: 30.1 PG (ref 27–31)
MCHC RBC AUTO-ENTMCNC: 32.3 G/DL (ref 32–36)
MCV RBC AUTO: 93 FL (ref 82–98)
MONOCYTES # BLD AUTO: 0.8 K/UL (ref 0.3–1)
MONOCYTES NFR BLD: 9.8 % (ref 4–15)
NEUTROPHILS # BLD AUTO: 4.3 K/UL (ref 1.8–7.7)
NEUTROPHILS NFR BLD: 55.5 % (ref 38–73)
NRBC BLD-RTO: 0 /100 WBC
PLATELET # BLD AUTO: 338 K/UL (ref 150–350)
PMV BLD AUTO: 10.7 FL (ref 9.2–12.9)
POCT GLUCOSE: 127 MG/DL (ref 70–110)
POCT GLUCOSE: 181 MG/DL (ref 70–110)
POCT GLUCOSE: 188 MG/DL (ref 70–110)
POCT GLUCOSE: 201 MG/DL (ref 70–110)
POCT GLUCOSE: 238 MG/DL (ref 70–110)
POTASSIUM SERPL-SCNC: 4.2 MMOL/L (ref 3.5–5.1)
RBC # BLD AUTO: 3.65 M/UL (ref 4–5.4)
SODIUM SERPL-SCNC: 132 MMOL/L (ref 136–145)
VANCOMYCIN SERPL-MCNC: 25.9 UG/ML
WBC # BLD AUTO: 7.75 K/UL (ref 3.9–12.7)

## 2020-07-19 PROCEDURE — S0073 INJECTION, AZTREONAM, 500 MG: HCPCS | Performed by: HOSPITALIST

## 2020-07-19 PROCEDURE — 25000003 PHARM REV CODE 250: Performed by: HOSPITALIST

## 2020-07-19 PROCEDURE — 80048 BASIC METABOLIC PNL TOTAL CA: CPT

## 2020-07-19 PROCEDURE — 63600175 PHARM REV CODE 636 W HCPCS: Performed by: HOSPITALIST

## 2020-07-19 PROCEDURE — 85025 COMPLETE CBC W/AUTO DIFF WBC: CPT

## 2020-07-19 PROCEDURE — 21400001 HC TELEMETRY ROOM

## 2020-07-19 PROCEDURE — C9113 INJ PANTOPRAZOLE SODIUM, VIA: HCPCS | Performed by: HOSPITALIST

## 2020-07-19 PROCEDURE — 80202 ASSAY OF VANCOMYCIN: CPT

## 2020-07-19 RX ADMIN — MORPHINE SULFATE 2 MG: 10 INJECTION INTRAVENOUS at 06:07

## 2020-07-19 RX ADMIN — MORPHINE SULFATE 2 MG: 10 INJECTION INTRAVENOUS at 08:07

## 2020-07-19 RX ADMIN — AZTREONAM 1000 MG: 1 INJECTION, POWDER, LYOPHILIZED, FOR SOLUTION INTRAMUSCULAR; INTRAVENOUS at 02:07

## 2020-07-19 RX ADMIN — PANTOPRAZOLE SODIUM 40 MG: 40 INJECTION, POWDER, FOR SOLUTION INTRAVENOUS at 08:07

## 2020-07-19 RX ADMIN — INSULIN ASPART 2 UNITS: 100 INJECTION, SOLUTION INTRAVENOUS; SUBCUTANEOUS at 12:07

## 2020-07-19 RX ADMIN — GABAPENTIN 300 MG: 300 CAPSULE ORAL at 02:07

## 2020-07-19 RX ADMIN — CLOPIDOGREL BISULFATE 75 MG: 75 TABLET, FILM COATED ORAL at 08:07

## 2020-07-19 RX ADMIN — ASPIRIN 81 MG 81 MG: 81 TABLET ORAL at 08:07

## 2020-07-19 RX ADMIN — MORPHINE SULFATE 2 MG: 10 INJECTION INTRAVENOUS at 02:07

## 2020-07-19 RX ADMIN — HEPARIN SODIUM 5000 UNITS: 5000 INJECTION INTRAVENOUS; SUBCUTANEOUS at 09:07

## 2020-07-19 RX ADMIN — MORPHINE SULFATE 2 MG: 10 INJECTION INTRAVENOUS at 11:07

## 2020-07-19 RX ADMIN — GABAPENTIN 300 MG: 300 CAPSULE ORAL at 08:07

## 2020-07-19 RX ADMIN — HEPARIN SODIUM 5000 UNITS: 5000 INJECTION INTRAVENOUS; SUBCUTANEOUS at 02:07

## 2020-07-19 RX ADMIN — HEPARIN SODIUM 5000 UNITS: 5000 INJECTION INTRAVENOUS; SUBCUTANEOUS at 05:07

## 2020-07-19 RX ADMIN — GABAPENTIN 300 MG: 300 CAPSULE ORAL at 09:07

## 2020-07-19 RX ADMIN — ATORVASTATIN CALCIUM 80 MG: 40 TABLET, FILM COATED ORAL at 08:07

## 2020-07-19 RX ADMIN — INSULIN DETEMIR 15 UNITS: 100 INJECTION, SOLUTION SUBCUTANEOUS at 08:07

## 2020-07-19 RX ADMIN — AZTREONAM 1000 MG: 1 INJECTION, POWDER, LYOPHILIZED, FOR SOLUTION INTRAMUSCULAR; INTRAVENOUS at 05:07

## 2020-07-19 RX ADMIN — AZTREONAM 1000 MG: 1 INJECTION, POWDER, LYOPHILIZED, FOR SOLUTION INTRAMUSCULAR; INTRAVENOUS at 11:07

## 2020-07-19 RX ADMIN — MORPHINE SULFATE 2 MG: 10 INJECTION INTRAVENOUS at 12:07

## 2020-07-19 NOTE — PROGRESS NOTES
Pharmacokinetic Assessment Follow Up: IV Vancomycin    Vancomycin serum concentration assessment(s):    The random level was drawn correctly and can be used to guide therapy at this time. The measurement is above the desired definitive target range of 10 to 20 mcg/mL.    Vancomycin Regimen Plan:  No dose today  Re-dose when the random level is less than 20 mcg/mL, next level to be drawn at 06:00 on 7/20    Drug levels (last 3 results):  Recent Labs   Lab Result Units 07/18/20 0428 07/19/20  0526   Vancomycin, Random ug/mL 19.1 25.9       Pharmacy will continue to follow and monitor vancomycin.    Please contact pharmacy at extension [phone] for questions regarding this assessment.    Thank you for the consult,   Evelyn Morris       Patient brief summary:  Suyapa Connelly is a 54 y.o. female initiated on antimicrobial therapy with IV Vancomycin for treatment of skin & soft tissue infection    Drug Allergies:   Review of patient's allergies indicates:   Allergen Reactions    Ciprofloxacin      Itchiness    Contrast media      Kidney injury    Pcn [penicillins]      Rash; tolerated ceftriaxone on 1/13/20       Actual Body Weight:   87 kg    Renal Function:   Estimated Creatinine Clearance: 32.2 mL/min (A) (based on SCr of 2.2 mg/dL (H)).,     Dialysis Method (if applicable):  N/A    CBC (last 72 hours):  Recent Labs   Lab Result Units 07/17/20  1631 07/18/20 0428 07/19/20  0526   WBC K/uL 9.79 8.98 7.75   Hemoglobin g/dL 11.3* 11.4* 11.0*   Hemoglobin A1C % >14.0*  --   --    Hematocrit % 35.4* 35.0* 34.1*   Platelets K/uL 370* 349 338   Gran% % 76.5* 66.6 55.5   Lymph% % 16.8* 23.2 31.9   Mono% % 5.5 8.2 9.8   Eosinophil% % 0.2 1.1 1.9   Basophil% % 0.6 0.6 0.8   Differential Method  Automated Automated Automated       Metabolic Panel (last 72 hours):  Recent Labs   Lab Result Units 07/17/20  1631 07/17/20  1815 07/18/20  0428 07/19/20  0526   Sodium mmol/L 130*  --  132* 132*   Potassium mmol/L 4.3  --  4.4 4.2   Chloride  mmol/L 100  --  104 105   CO2 mmol/L 18*  --  17* 18*   Glucose mg/dL 483*  --  211* 114*   Glucose, UA   --  3+*  --   --    BUN, Bld mg/dL 38*  --  37* 39*   Creatinine mg/dL 2.5*  --  2.0* 2.2*   Albumin g/dL 3.0*  --   --   --    Total Bilirubin mg/dL 0.5  --   --   --    Alkaline Phosphatase U/L 115  --   --   --    AST U/L 11  --   --   --    ALT U/L <5*  --   --   --    Magnesium mg/dL 2.1  --   --   --        Vancomycin Administrations:  vancomycin given in the last 96 hours                     vancomycin in dextrose 5 % 1 gram/250 mL IVPB 1,000 mg (mg) 1,000 mg New Bag 07/18/20 1711    vancomycin 1.25 g in dextrose 5% 250 mL IVPB (ready to mix) ()  Restarted 07/18/20 0042     1,250 mg New Bag 07/17/20 2208                    Microbiologic Results:  Microbiology Results (last 7 days)       Procedure Component Value Units Date/Time    Aerobic culture [[id number]]  (Abnormal) Collected: 07/18/20 0929    Order Status: Completed Specimen: Bone from Foot, Left Updated: 07/19/20 0646     Aerobic Bacterial Culture ENTEROCOCCUS SPECIES  Moderate  Identification and susceptibility pending      Gram stain [[id number]] Collected: 07/18/20 0929    Order Status: Completed Specimen: Bone from Foot, Left Updated: 07/19/20 0346     Gram Stain Result No WBC's      No organisms seen    Blood Culture #1 **CANNOT BE ORDERED STAT** [[id number]] Collected: 07/17/20 1631    Order Status: Completed Specimen: Blood from Peripheral, Antecubital, Right Updated: 07/18/20 1903     Blood Culture, Routine No Growth to date      No Growth to date    Blood Culture #2 **CANNOT BE ORDERED STAT** [[id number]] Collected: 07/17/20 1712    Order Status: Completed Specimen: Blood from Peripheral, Hand, Left Updated: 07/18/20 1903     Blood Culture, Routine No Growth to date      No Growth to date

## 2020-07-19 NOTE — PLAN OF CARE
Problem: Adult Inpatient Plan of Care  Goal: Plan of Care Review  Outcome: Ongoing, Progressing  Flowsheets (Taken 7/19/2020 1516)  Plan of Care Reviewed With: patient   Pt free from falls, injury or any further trauma throughout shift. Pt AAO x 4. Continued medications as ordered. Complaints of pain, controlled with medications. Wound care provided per podiatry, ordered BID. Off-loading boots in use. Accu check monitored, insulin PRN. Pt in no distress. Will cont to monitor.

## 2020-07-19 NOTE — NURSING
1908 Received bedside report from JEREMÍAS Caba. Patient alert and oriented. No pain. No sign of distress. On room air.Cardiac monitor in place. Left foot dressing dry and intact. Call bell given on her reach. Instructed to call for nurse attention all the time. Bed on lowest position. Bed alarm on. Safety maintained.

## 2020-07-19 NOTE — SUBJECTIVE & OBJECTIVE
Interval History: No new issues     Review of Systems   Constitutional: Positive for fatigue. Negative for activity change.   HENT: Negative for congestion.    Respiratory: Negative for chest tightness and shortness of breath.    Cardiovascular: Negative for chest pain.   Gastrointestinal: Negative for abdominal pain.   Genitourinary: Negative for difficulty urinating.     Objective:     Vital Signs (Most Recent):  Temp: 98.8 °F (37.1 °C) (07/19/20 0447)  Pulse: 98 (07/19/20 0447)  Resp: 18 (07/19/20 0447)  BP: (!) 112/55 (07/19/20 0447)  SpO2: 97 % (07/19/20 0447) Vital Signs (24h Range):  Temp:  [97.6 °F (36.4 °C)-98.8 °F (37.1 °C)] 98.8 °F (37.1 °C)  Pulse:  [] 98  Resp:  [16-19] 18  SpO2:  [97 %-100 %] 97 %  BP: (112-133)/(55-76) 112/55     Weight: 87 kg (191 lb 12.8 oz)  Body mass index is 31.92 kg/m².    Intake/Output Summary (Last 24 hours) at 7/19/2020 0723  Last data filed at 7/19/2020 0447  Gross per 24 hour   Intake 0 ml   Output --   Net 0 ml      Physical Exam  Vitals signs and nursing note reviewed.   Constitutional:       Appearance: She is obese. She is not ill-appearing or toxic-appearing.   HENT:      Head: Normocephalic and atraumatic.   Pulmonary:      Effort: No respiratory distress.   Neurological:      Mental Status: She is alert and oriented to person, place, and time.   Psychiatric:         Mood and Affect: Mood normal.         Behavior: Behavior normal.         Significant Labs:   BMP:   Recent Labs   Lab 07/17/20  1631  07/19/20  0526   *   < > 114*   *   < > 132*   K 4.3   < > 4.2      < > 105   CO2 18*   < > 18*   BUN 38*   < > 39*   CREATININE 2.5*   < > 2.2*   CALCIUM 9.5   < > 9.0   MG 2.1  --   --     < > = values in this interval not displayed.     CBC:   Recent Labs   Lab 07/17/20  1631 07/18/20  0428 07/19/20  0526   WBC 9.79 8.98 7.75   HGB 11.3* 11.4* 11.0*   HCT 35.4* 35.0* 34.1*   * 349 338       Significant Imaging:

## 2020-07-19 NOTE — PROGRESS NOTES
[facility] Hospital Medicine  Progress Note    Patient Name: Suyapa Connelly  MRN: [id number]  Patient Class: IP- Inpatient   Admission Date: 7/17/2020  Length of Stay: 2 days  Attending Physician: Hitesh Mason MD  Primary Care Provider: Erlinda Rahman NP        Subjective:     Principal Problem:Acute osteomyelitis of left calcaneus        HPI:  Suyapa Connelly is a 54 y.o. female that (in part)  has a past medical history of Anxiety, Depression, Diabetes mellitus, GERD (gastroesophageal reflux disease), Hyperlipemia, Hypertension, and Myocardial infarction.  has a past surgical history that includes Angiogram - Right Extremity (Right, 7/9/15); angiogram-left leg (10/6/15); Catheterization of both left and right heart (N/A, 12/18/2019); Coronary angiography (N/A, 12/18/2019); Coronary Artery Bypass Graft (CABG) (N/A, 1/14/2020); and Insertion of tunneled central venous hemodialysis catheter (N/A, 1/27/2020). Presents to [facility] Emergency Department complaining of the left heel foot wound.  This is been there for several weeks with progressive worsening.  Poor healing.  Patient became concerned due to malodorous drainage.  Pain with ambulation and weight-bearing.  Associated hyperglycemia.  Denies fever chills.    In the emergency department physical exam concerning for osteomyelitis secondary to diabetic foot ulcer in the setting of uncontrolled diabetes.  Routine laboratory studies revealed hyperglycemia.  X-ray of the foot performed.  Concerning for osteomyelitis.  MRI confirmed this.  Podiatry was consulted.  Broad-spectrum antibiotics were initiated in the ED a special consideration given to risk of Pseudomonas infection.    Hospital medicine has been asked to admit to inpatient for further evaluation and treatment.     Overview/Hospital Course:  Suyapa Connelly is a 54 y.o. female that (in part)  has a past medical history of Anxiety, Depression, Diabetes  mellitus, GERD (gastroesophageal reflux disease), Hyperlipemia, Hypertension, and Myocardial infarction.  has a past surgical history that includes Angiogram - Right Extremity (Right, 7/9/15); angiogram-left leg (10/6/15); Catheterization of both left and right heart (N/A, 12/18/2019); Coronary angiography (N/A, 12/18/2019); Coronary Artery Bypass Graft (CABG) (N/A, 1/14/2020); and Insertion of tunneled central venous hemodialysis catheter (N/A, 1/27/2020). Presents to [facility] Emergency Department complaining of the left heel foot wound.  MRI confirmed osteo. Podiatry, ID, and vascular were consulted.  Podiatry discussed options with patient. She has chosen 6 weeks of IV abx. ID has been consulted to tailor abx. Blood cultures were NG. Patient started on multiple Abx. Bone bx on 7/18.     Interval History: No new issues     Review of Systems   Constitutional: Positive for fatigue. Negative for activity change.   HENT: Negative for congestion.    Respiratory: Negative for chest tightness and shortness of breath.    Cardiovascular: Negative for chest pain.   Gastrointestinal: Negative for abdominal pain.   Genitourinary: Negative for difficulty urinating.     Objective:     Vital Signs (Most Recent):  Temp: 98.8 °F (37.1 °C) (07/19/20 0447)  Pulse: 98 (07/19/20 0447)  Resp: 18 (07/19/20 0447)  BP: (!) 112/55 (07/19/20 0447)  SpO2: 97 % (07/19/20 0447) Vital Signs (24h Range):  Temp:  [97.6 °F (36.4 °C)-98.8 °F (37.1 °C)] 98.8 °F (37.1 °C)  Pulse:  [] 98  Resp:  [16-19] 18  SpO2:  [97 %-100 %] 97 %  BP: (112-133)/(55-76) 112/55     Weight: 87 kg (191 lb 12.8 oz)  Body mass index is 31.92 kg/m².    Intake/Output Summary (Last 24 hours) at 7/19/2020 0706  Last data filed at 7/19/2020 0447  Gross per 24 hour   Intake 0 ml   Output --   Net 0 ml      Physical Exam  Vitals signs and nursing note reviewed.   Constitutional:       Appearance: She is obese. She is not ill-appearing or  toxic-appearing.   HENT:      Head: Normocephalic and atraumatic.   Pulmonary:      Effort: No respiratory distress.   Neurological:      Mental Status: She is alert and oriented to person, place, and time.   Psychiatric:         Mood and Affect: Mood normal.         Behavior: Behavior normal.         Significant Labs:   BMP:   Recent Labs   Lab 07/17/20  1631  07/19/20  0526   *   < > 114*   *   < > 132*   K 4.3   < > 4.2      < > 105   CO2 18*   < > 18*   BUN 38*   < > 39*   CREATININE 2.5*   < > 2.2*   CALCIUM 9.5   < > 9.0   MG 2.1  --   --     < > = values in this interval not displayed.     CBC:   Recent Labs   Lab 07/17/20  1631 07/18/20  0428 07/19/20  0526   WBC 9.79 8.98 7.75   HGB 11.3* 11.4* 11.0*   HCT 35.4* 35.0* 34.1*   * 349 338       Significant Imaging:       Assessment/Plan:      * Acute osteomyelitis of left calcaneus  MRI confirms osteo. Bone Bx- 78/18 IV abx.  6 weeks with H/H on discharge later this week. Follow podiatry recs.         Stage 3 chronic kidney disease  No acute issues  Stable.       Non healing left heel wound  As below       S/P CABG x 1  No acute issues       Mixed hyperlipidemia  Resume home meds       Chronic combined systolic and diastolic heart failure  No acute issues   EF 50% on recent echo       Essential hypertension  Resumed home meds       Type 2 diabetes mellitus without complication, with long-term current use of insulin  Uncontrolled. Manifestations likely of CKD 3    Will check an A1c- > 14.        Peripheral artery disease  Extensive history.  Vascular has been consulted- pending      obesity Body mass index is 31.92 kg/m².  Weight loss as out patient     VTE Risk Mitigation (From admission, onward)         Ordered     heparin (porcine) injection 5,000 Units  Every 8 hours      07/17/20 2217     IP VTE HIGH RISK PATIENT  Once      07/17/20 2217     Place sequential compression device  Until discontinued      07/17/20 2217     Place HAILEY  hose  Until discontinued      07/17/20 7521                      Hitesh Barrow MD  Department of Hospital Medicine   [facility]

## 2020-07-19 NOTE — PROGRESS NOTES
[facility]  Podiatry  Consult Note    Patient Name: Suyapa Connelly  MRN: [id number]  Admission Date: 7/17/2020  Hospital Length of Stay: 2 days  Attending Physician: Hitesh Mason MD  Primary Care Provider: Erlinda Rahman NP     Consults  Subjective:     History of Present Illness: 53 y/o male PMH DM2, PAD, CABG admitted for left heel infection. Reports wound started as a blister in Jan. 2020 after she was in a coma for 11 days following CABG procedure. Reports she was then discharged to rehab. Reports applying neosporin to left heel ulceration however the wound has progressively worsened in the past week. Reports she did not seek medical attention for the left heel wound prior due to COVID concerns as she was taking care of her elderly parents. Reports nausea today however reports this has been present for several weeks was taking zofran for this. Denies fevers, chills.      7/19/20 : Patient seen bedside , no heel protector boots in place. Reports 8/10 pain left heel.Receiving IV morphine for pain reports this helps some with the pain.     Scheduled Meds:   amiodarone  400 mg Oral Daily    aspirin  81 mg Oral Daily    atorvastatin  80 mg Oral Daily    aztreonam  1,000 mg Intravenous Q8H    carvediloL  6.25 mg Oral Daily    ciprofloxacin  400 mg Intravenous Q18H    clopidogreL  75 mg Oral Daily    gabapentin  300 mg Oral TID    heparin (porcine)  5,000 Units Subcutaneous Q8H    insulin detemir U-100  15 Units Subcutaneous Daily    pantoprazole  40 mg Intravenous Daily     Continuous Infusions:  PRN Meds:acetaminophen, acetaminophen, dextrose 50%, diphenhydrAMINE, glucagon (human recombinant), insulin aspart U-100, melatonin, morphine, ondansetron, sodium chloride 0.9%, Pharmacy to dose Vancomycin consult **AND** vancomycin - pharmacy to dose    Review of patient's allergies indicates:   Allergen Reactions    Ciprofloxacin      Itchiness    Contrast media      Kidney injury     Pcn [penicillins]      Rash; tolerated ceftriaxone on 1/13/20        Past Medical History:   Diagnosis Date    Anxiety     Depression     Diabetes mellitus     type 2    GERD (gastroesophageal reflux disease)     Hyperlipemia     Hypertension     Myocardial infarction 2010    minor-caused by stress per pt.     Past Surgical History:   Procedure Laterality Date    Angiogram - Right Extremity Right 7/9/15    angiogram-left leg  10/6/15    CATHETERIZATION OF BOTH LEFT AND RIGHT HEART N/A 12/18/2019    Procedure: CATHETERIZATION, HEART, BOTH LEFT AND RIGHT;  Surgeon: Que Fernando III, MD;  Location: [facility] CATH LAB;  Service: Cardiology;  Laterality: N/A;    CORONARY ANGIOGRAPHY N/A 12/18/2019    Procedure: ANGIOGRAM, CORONARY ARTERY;  Surgeon: Que Fernando III, MD;  Location: [facility] CATH LAB;  Service: Cardiology;  Laterality: N/A;    CORONARY ARTERY BYPASS GRAFT (CABG) N/A 1/14/2020    Procedure: CORONARY ARTERY BYPASS GRAFT (CABG) x 1     Off Pump;  Surgeon: Huang Altamirano MD;  Location: [facility] OR [address] [place] [address];  Service: Cardiovascular;  Laterality: N/A;    INSERTION OF TUNNELED CENTRAL VENOUS HEMODIALYSIS CATHETER N/A 1/27/2020    Procedure: Insertion, Catheter, Central Venous, Hemodialysis;  Surgeon: ESTEBAN Gomez III, MD;  Location: [facility] CATH LAB;  Service: Peripheral Vascular;  Laterality: N/A;       Family History     None        Tobacco Use    Smoking status: Never Smoker    Smokeless tobacco: Never Used   Substance and Sexual Activity    Alcohol use: No    Drug use: Yes     Types: Marijuana     Comment: used yesterday    Sexual activity: Not on file     Review of Systems   Constitutional: Negative.    Respiratory: Negative.    Cardiovascular: Negative.    Gastrointestinal: Negative.    Genitourinary: Negative.    Musculoskeletal: Positive for arthralgias.   Skin: Positive for wound.   Neurological: Negative.    Psychiatric/Behavioral: Negative.      Objective:     Vital Signs  (Most Recent):  Temp: 98.4 °F (36.9 °C) (07/19/20 0752)  Pulse: 103 (07/19/20 0752)  Resp: 15 (07/19/20 0814)  BP: (!) 101/57 (07/19/20 0752)  SpO2: 100 % (07/19/20 0752) Vital Signs (24h Range):  Temp:  [97.6 °F (36.4 °C)-98.8 °F (37.1 °C)] 98.4 °F (36.9 °C)  Pulse:  [] 103  Resp:  [15-19] 15  SpO2:  [97 %-100 %] 100 %  BP: (101-133)/(55-76) 101/57     Weight: 87 kg (191 lb 12.8 oz)  Body mass index is 31.92 kg/m².    Foot Exam    General  Orientation: alert and oriented to person, place, and time       Right Foot/Ankle     Inspection and Palpation  Swelling: none     Neurovascular  Dorsalis pedis: 1+  Posterior tibial: 1+  Saphenous nerve sensation: diminished  Tibial nerve sensation: diminished  Superficial peroneal nerve sensation: diminished  Deep peroneal nerve sensation: diminished  Sural nerve sensation: diminished      Left Foot/Ankle      Inspection and Palpation  Tenderness: calcaneus tenderness   Skin Exam: ulcer;     Neurovascular  Dorsalis pedis: 1+  Posterior tibial: 1+  Saphenous nerve sensation: diminished  Tibial nerve sensation: diminished  Superficial peroneal nerve sensation: diminished  Deep peroneal nerve sensation: diminished  Sural nerve sensation: diminished            7/19/20:            7/18/20:  Wound 1: Left heel   Measurement: 5cyt2fec8.2cm  pre debridement 5.3cmx5.3cmx0.5cm post debridement.  Base: fibrous and necrotic eschar base  Periwound skin: HPK  Drainage: Purulent with malodor  Erythema: mild  Probe: probe to bone.         Pre debridement         Post debridement         Laboratory:  CBC:   Recent Labs   Lab 07/19/20  0526   WBC 7.75   RBC 3.65*   HGB 11.0*   HCT 34.1*      MCV 93   MCH 30.1   MCHC 32.3     CMP:   Recent Labs   Lab 07/17/20  1631  07/19/20  0526   *   < > 114*   CALCIUM 9.5   < > 9.0   ALBUMIN 3.0*  --   --    PROT 8.5*  --   --    *   < > 132*   K 4.3   < > 4.2   CO2 18*   < > 18*      < > 105   BUN 38*   < > 39*   CREATININE  2.5*   < > 2.2*   ALKPHOS 115  --   --    ALT <5*  --   --    AST 11  --   --    BILITOT 0.5  --   --     < > = values in this interval not displayed.       Diagnostic Results:  X-Ray Foot Complete Left  Order: [id number]  Status:  Final result   Visible to patient:  No (not released) Next appt:  None Dx:  Non healing left heel wound  Details    Reading Physician Reading Date Result Priority   Solomon ALYMable HuizarJuventinoDO  [phone] 7/17/2020 STAT      Narrative & Impression     EXAMINATION:  XR FOOT COMPLETE 3 VIEW LEFT     CLINICAL HISTORY:  .  Unspecified open wound, left foot, initial encounter     TECHNIQUE:  AP, lateral and oblique views of the left foot were performed.     COMPARISON:  None     FINDINGS:  No evidence for acute fracture line or dislocation of the left foot.  Prominent mixed density opacity overlies the 1st distal phalanx with radiopaque and soft tissue density overall indeterminate.  Clinical correlation advised.     There is a large lucency projected over the calcaneal soft tissues concerning for possible ulceration.  No definite bony erosion of the underlying calcaneus.  Clinical correlation and further evaluation with MRI as warranted if patient compatible.  There is prominent vascular calcifications within the soft tissues.     Impression:     Please see above              MRI Foot (Hindfoot) Left Without Contrast  Order: [id number]  Status:  Final result   Visible to patient:  No (not released) Next appt:  None  Details    Reading Physician Reading Date Result Priority   Ayesha Lindo MD  [phone] 7/17/2020 STAT      Narrative & Impression     EXAMINATION:  MRI FOOT (HINDFOOT) LEFT WITHOUT CONTRAST     CLINICAL HISTORY:  Osteomyelitis suspected, diabetic;calcaneus osteo;     TECHNIQUE:  Multiplanar, multisequence images were obtained of the left hind foot without the use of IV contrast.     COMPARISON:  Left foot radiographs from the same date.     FINDINGS:  Osseous edema  with cortical indistinctness and T1 hypointensity are seen involving the posterior aspect of the calcaneus consistent with osteomyelitis, noting overlying ulceration seen posteriorly in this region.  Achilles tendon is intact.  Remaining visualized osseous structures show no significant osseous edema, fracture, or marrow replacement process.  No focal fluid collections seen.  There is posterior subcutaneous edema.  No significant ankle joint effusion.  Mild edema is also noted in Kager's fat pad.     Impression:     Osteomyelitis involving the posterior aspect of the calcaneus.           US Lower Extremity Arteries Bilateral  Order: [id number]  Status:  Final result   Visible to patient:  No (not released) Next appt:  None Dx:  PAD (peripheral artery disease)  Details    Reading Physician Reading Date Result Priority   Ayesha Lindo MD  [phone] 7/17/2020 STAT      Narrative & Impression     EXAMINATION:  US LOWER EXTREMITY ARTERIES BILATERAL     CLINICAL HISTORY:  Peripheral vascular disease, unspecified     TECHNIQUE:  Bilateral spectral, color and grayscale images of the large arteries of both lower extremities were performed.     COMPARISON:  CT lower extremity runoff from 01/03/2020.     FINDINGS:  Right lower extremity:     Common femoral artery: 128 cm/sec, biphasic     SFA proximal: 210 cm/sec, triphasic     SFA mid: 105 cm/sec, triphasic     SFA distal: 86 cm/sec, biphasic     Popliteal artery graft: Patent with velocity measuring 479 cm/sec at the proximal anastomosis, with velocities ranging from 49-54 cm/sec within the proximal to distal portions of the graft with monophasic waveform seen throughout.     Posterior tibial artery: 24 cm/sec     Anterior tibial artery: 55 cm/sec     Left lower extremity:     Common femoral artery to popliteal graft: Patent with velocity measuring 184 cm/sec at the proximal anastomosis with triphasic waveforms.  Monophasic waveforms are seen distally within the graft  with velocities measuring 67 cm/sec proximally, 25 cm/sec midportion, and 53 cm/sec distally.     SFA proximal: 15 cm/sec, monophasic     SFA mid: 67 cm/sec, monophasic     SFA distal: 175 cm/sec, monophasic     Popliteal artery: 28 cm/sec, monophasic     Posterior tibial artery: 17 cm/sec     Anterior tibial artery: 57 cm/sec     Impression:     1. Patent right lower extremity popliteal artery bypass graft.  Elevated velocities seen at the proximal anastomosis measuring 479 cm/sec consistent with hemodynamically significant stenosis.  2. Patent left lower extremity common femoral artery to popliteal artery bypass graft.  Doubling of velocities seen within the mid to distal portion of the graft suggestive for hemodynamically significant stenosis.  3. Doubling of velocities with hemodynamically significant stenosis between the left popliteal artery and anterior tibial artery.                     Clinical Findings:  Left heel ulceration with probe to bone.    Assessment/Plan:     Active Diagnoses:    Diagnosis Date Noted POA    PRINCIPAL PROBLEM:  Acute osteomyelitis of left calcaneus [M86.172] 07/18/2020 Yes    Stage 3 chronic kidney disease [N18.3] 07/18/2020 Yes    Non healing left heel wound [S91.302A] 07/17/2020 Yes    S/P CABG x 1 [Z95.1] 01/27/2020 Not Applicable    Mixed hyperlipidemia [E78.2] 12/13/2019 Yes    Chronic combined systolic and diastolic heart failure [I50.42] 12/08/2019 Yes    Type 2 diabetes mellitus without complication, with long-term current use of insulin [E11.9, Z79.4] 05/05/2018 Not Applicable    Essential hypertension [I10] 05/05/2018 Yes    Peripheral artery disease [I73.9]  Yes      Problems Resolved During this Admission:     S/p bone biopsy, left heel debridement.     Discussed two options with patient. L BKA vs IV abx for six weeks with aggressive wound care for several months with no guarantee of wound resolution.  Patient would like to try IV abx, wound care at this time.      Abx per ID.     Vascular surgery consult pending     ID consult placed to further tailor abx.     Pending vascular surgery reccs. Will plan for further debridement in OR this week.     Podiatry will follow.     Heel protector boots applied       Thank you for your consult. I will follow-up with patient. Please contact us if you have any additional questions.    Rosio Mayes DPM  Podiatry  [facility]

## 2020-07-19 NOTE — PLAN OF CARE
Problem: Wound  Goal: Optimal Wound Healing  Outcome: Ongoing, Progressing  Intervention: Promote Effective Wound Healing  Flowsheets (Taken 7/19/2020 8891)  Oral Nutrition Promotion:   medicated   rest periods promoted   social interaction promoted  Pain Management Interventions:   awakened for pain meds per patient request   care clustered

## 2020-07-19 NOTE — ASSESSMENT & PLAN NOTE
MRI confirms osteo. Bone Bx- 78/18 IV abx.  6 weeks with H/H on discharge later this week. Follow podiatry recs.

## 2020-07-20 PROBLEM — A49.9 GRAM-NEGATIVE INFECTION: Status: ACTIVE | Noted: 2020-07-20

## 2020-07-20 PROBLEM — A49.1 INFECTION DUE TO ENTEROCOCCUS: Status: ACTIVE | Noted: 2020-07-20

## 2020-07-20 LAB
ANION GAP SERPL CALC-SCNC: 9 MMOL/L (ref 8–16)
BUN SERPL-MCNC: 47 MG/DL (ref 6–20)
CALCIUM SERPL-MCNC: 9.1 MG/DL (ref 8.7–10.5)
CHLORIDE SERPL-SCNC: 104 MMOL/L (ref 95–110)
CO2 SERPL-SCNC: 20 MMOL/L (ref 23–29)
CREAT SERPL-MCNC: 2.4 MG/DL (ref 0.5–1.4)
EST. GFR  (AFRICAN AMERICAN): 26 ML/MIN/1.73 M^2
EST. GFR  (NON AFRICAN AMERICAN): 22 ML/MIN/1.73 M^2
GLUCOSE SERPL-MCNC: 118 MG/DL (ref 70–110)
LEFT ABI: 2.13
LEFT ARM BP: 120 MMHG
LEFT DORSALIS PEDIS: 255 MMHG
LEFT POSTERIOR TIBIAL: 53 MMHG
LEFT TBI: 0
LEFT TOE PRESSURE: 0 MMHG
POCT GLUCOSE: 164 MG/DL (ref 70–110)
POCT GLUCOSE: 278 MG/DL (ref 70–110)
POTASSIUM SERPL-SCNC: 4.4 MMOL/L (ref 3.5–5.1)
RIGHT ABI: 2.13
RIGHT DORSALIS PEDIS: 78 MMHG
RIGHT POSTERIOR TIBIAL: 255 MMHG
RIGHT TBI: 0
RIGHT TOE PRESSURE: 0 MMHG
SODIUM SERPL-SCNC: 133 MMOL/L (ref 136–145)
VANCOMYCIN SERPL-MCNC: 19.1 UG/ML

## 2020-07-20 PROCEDURE — 63600175 PHARM REV CODE 636 W HCPCS: Performed by: HOSPITALIST

## 2020-07-20 PROCEDURE — S0073 INJECTION, AZTREONAM, 500 MG: HCPCS | Performed by: HOSPITALIST

## 2020-07-20 PROCEDURE — C9113 INJ PANTOPRAZOLE SODIUM, VIA: HCPCS | Performed by: HOSPITALIST

## 2020-07-20 PROCEDURE — S0073 INJECTION, AZTREONAM, 500 MG: HCPCS

## 2020-07-20 PROCEDURE — 25000003 PHARM REV CODE 250: Performed by: HOSPITALIST

## 2020-07-20 PROCEDURE — 80048 BASIC METABOLIC PNL TOTAL CA: CPT

## 2020-07-20 PROCEDURE — 25000003 PHARM REV CODE 250: Performed by: INTERNAL MEDICINE

## 2020-07-20 PROCEDURE — 99232 SBSQ HOSP IP/OBS MODERATE 35: CPT | Mod: ,,, | Performed by: SURGERY

## 2020-07-20 PROCEDURE — 21400001 HC TELEMETRY ROOM

## 2020-07-20 PROCEDURE — 99232 PR SUBSEQUENT HOSPITAL CARE,LEVL II: ICD-10-PCS | Mod: ,,, | Performed by: SURGERY

## 2020-07-20 PROCEDURE — 99232 PR SUBSEQUENT HOSPITAL CARE,LEVL II: ICD-10-PCS | Mod: ,,, | Performed by: INTERNAL MEDICINE

## 2020-07-20 PROCEDURE — 25000003 PHARM REV CODE 250

## 2020-07-20 PROCEDURE — 80202 ASSAY OF VANCOMYCIN: CPT

## 2020-07-20 PROCEDURE — 99232 SBSQ HOSP IP/OBS MODERATE 35: CPT | Mod: ,,, | Performed by: INTERNAL MEDICINE

## 2020-07-20 RX ORDER — MORPHINE SULFATE 10 MG/ML
3 INJECTION INTRAMUSCULAR; INTRAVENOUS; SUBCUTANEOUS ONCE
Status: COMPLETED | OUTPATIENT
Start: 2020-07-20 | End: 2020-07-20

## 2020-07-20 RX ORDER — SODIUM CHLORIDE 9 MG/ML
INJECTION, SOLUTION INTRAVENOUS CONTINUOUS
Status: DISCONTINUED | OUTPATIENT
Start: 2020-07-20 | End: 2020-07-21

## 2020-07-20 RX ORDER — DIPHENHYDRAMINE HCL 25 MG
25 CAPSULE ORAL ONCE
Status: COMPLETED | OUTPATIENT
Start: 2020-07-21 | End: 2020-07-21

## 2020-07-20 RX ADMIN — ATORVASTATIN CALCIUM 80 MG: 40 TABLET, FILM COATED ORAL at 09:07

## 2020-07-20 RX ADMIN — HEPARIN SODIUM 5000 UNITS: 5000 INJECTION INTRAVENOUS; SUBCUTANEOUS at 05:07

## 2020-07-20 RX ADMIN — GABAPENTIN 300 MG: 300 CAPSULE ORAL at 08:07

## 2020-07-20 RX ADMIN — CLOPIDOGREL BISULFATE 75 MG: 75 TABLET, FILM COATED ORAL at 09:07

## 2020-07-20 RX ADMIN — GABAPENTIN 300 MG: 300 CAPSULE ORAL at 09:07

## 2020-07-20 RX ADMIN — MORPHINE SULFATE 3 MG: 10 INJECTION INTRAVENOUS at 06:07

## 2020-07-20 RX ADMIN — AZTREONAM 1000 MG: 1 INJECTION, POWDER, LYOPHILIZED, FOR SOLUTION INTRAMUSCULAR; INTRAVENOUS at 09:07

## 2020-07-20 RX ADMIN — MORPHINE SULFATE 2 MG: 10 INJECTION INTRAVENOUS at 08:07

## 2020-07-20 RX ADMIN — ASPIRIN 81 MG 81 MG: 81 TABLET ORAL at 09:07

## 2020-07-20 RX ADMIN — AZTREONAM 1000 MG: 1 INJECTION, POWDER, LYOPHILIZED, FOR SOLUTION INTRAMUSCULAR; INTRAVENOUS at 01:07

## 2020-07-20 RX ADMIN — SODIUM CHLORIDE: 0.9 INJECTION, SOLUTION INTRAVENOUS at 05:07

## 2020-07-20 RX ADMIN — MORPHINE SULFATE 2 MG: 10 INJECTION INTRAVENOUS at 04:07

## 2020-07-20 RX ADMIN — PANTOPRAZOLE SODIUM 40 MG: 40 INJECTION, POWDER, FOR SOLUTION INTRAVENOUS at 08:07

## 2020-07-20 RX ADMIN — PREDNISONE 50 MG: 20 TABLET ORAL at 10:07

## 2020-07-20 RX ADMIN — MORPHINE SULFATE 2 MG: 10 INJECTION INTRAVENOUS at 01:07

## 2020-07-20 RX ADMIN — AZTREONAM 1000 MG: 1 INJECTION, POWDER, LYOPHILIZED, FOR SOLUTION INTRAMUSCULAR; INTRAVENOUS at 05:07

## 2020-07-20 RX ADMIN — AMIODARONE HYDROCHLORIDE 400 MG: 200 TABLET ORAL at 09:07

## 2020-07-20 RX ADMIN — GABAPENTIN 300 MG: 300 CAPSULE ORAL at 02:07

## 2020-07-20 RX ADMIN — HEPARIN SODIUM 5000 UNITS: 5000 INJECTION INTRAVENOUS; SUBCUTANEOUS at 10:07

## 2020-07-20 RX ADMIN — CARVEDILOL 6.25 MG: 6.25 TABLET, FILM COATED ORAL at 09:07

## 2020-07-20 RX ADMIN — HEPARIN SODIUM 5000 UNITS: 5000 INJECTION INTRAVENOUS; SUBCUTANEOUS at 02:07

## 2020-07-20 RX ADMIN — INSULIN ASPART 1 UNITS: 100 INJECTION, SOLUTION INTRAVENOUS; SUBCUTANEOUS at 10:07

## 2020-07-20 RX ADMIN — DIPHENHYDRAMINE HYDROCHLORIDE 25 MG: 25 CAPSULE ORAL at 10:07

## 2020-07-20 RX ADMIN — VANCOMYCIN HYDROCHLORIDE 500 MG: 500 INJECTION, POWDER, LYOPHILIZED, FOR SOLUTION INTRAVENOUS at 12:07

## 2020-07-20 NOTE — PROGRESS NOTES
[facility] Hospital Medicine  Progress Note    Patient Name: Suyapa Connelly  MRN: [id number]  Patient Class: IP- Inpatient   Admission Date: 7/17/2020  Length of Stay: 3 days  Attending Physician: Silvestre Espana MD  Primary Care Provider: Erlinda Rahman NP        Subjective:     Principal Problem:Acute osteomyelitis of left calcaneus        HPI:  Suyapa Connelly is a 54 y.o. female that (in part)  has a past medical history of Anxiety, Depression, Diabetes mellitus, GERD (gastroesophageal reflux disease), Hyperlipemia, Hypertension, and Myocardial infarction.  has a past surgical history that includes Angiogram - Right Extremity (Right, 7/9/15); angiogram-left leg (10/6/15); Catheterization of both left and right heart (N/A, 12/18/2019); Coronary angiography (N/A, 12/18/2019); Coronary Artery Bypass Graft (CABG) (N/A, 1/14/2020); and Insertion of tunneled central venous hemodialysis catheter (N/A, 1/27/2020). Presents to [facility] Emergency Department complaining of the left heel foot wound.  This is been there for several weeks with progressive worsening.  Poor healing.  Patient became concerned due to malodorous drainage.  Pain with ambulation and weight-bearing.  Associated hyperglycemia.  Denies fever chills.    In the emergency department physical exam concerning for osteomyelitis secondary to diabetic foot ulcer in the setting of uncontrolled diabetes.  Routine laboratory studies revealed hyperglycemia.  X-ray of the foot performed.  Concerning for osteomyelitis.  MRI confirmed this.  Podiatry was consulted.  Broad-spectrum antibiotics were initiated in the ED a special consideration given to risk of Pseudomonas infection.    Hospital medicine has been asked to admit to inpatient for further evaluation and treatment.     Overview/Hospital Course:  Suyapa Connelly is a 54 y.o. female that (in part)  has a past medical history of Anxiety, Depression, Diabetes  mellitus, GERD (gastroesophageal reflux disease), Hyperlipemia, Hypertension, and Myocardial infarction.  has a past surgical history that includes Angiogram - Right Extremity (Right, 7/9/15); angiogram-left leg (10/6/15); Catheterization of both left and right heart (N/A, 12/18/2019); Coronary angiography (N/A, 12/18/2019); Coronary Artery Bypass Graft (CABG) (N/A, 1/14/2020); and Insertion of tunneled central venous hemodialysis catheter (N/A, 1/27/2020). Presents to [facility] Emergency Department complaining of the left heel foot wound.  MRI confirmed osteo. Podiatry, ID, and vascular were consulted.  Podiatry discussed options with patient. She has chosen 6 weeks of IV abx. ID has been consulted to tailor abx. Blood cultures were NG. Patient started on multiple Abx. Bone bx on 7/18. Vascular Surgery consulted and planning angiogram.    Interval History: No new complaints.    Review of Systems   HENT: Negative for ear discharge and ear pain.    Eyes: Negative for discharge and itching.   Endocrine: Negative for cold intolerance and heat intolerance.   Neurological: Negative for seizures and syncope.     Objective:     Vital Signs (Most Recent):  Temp: 98 °F (36.7 °C) (07/20/20 1608)  Pulse: 97 (07/20/20 1608)  Resp: 18 (07/20/20 1608)  BP: 128/68 (07/20/20 1608)  SpO2: 98 % (07/20/20 1608) Vital Signs (24h Range):  Temp:  [98 °F (36.7 °C)-98.2 °F (36.8 °C)] 98 °F (36.7 °C)  Pulse:  [] 97  Resp:  [16-19] 18  SpO2:  [95 %-100 %] 98 %  BP: (107-145)/(57-68) 128/68     Weight: 87.5 kg (192 lb 14.4 oz)  Body mass index is 32.1 kg/m².    Intake/Output Summary (Last 24 hours) at 7/20/2020 1618  Last data filed at 7/20/2020 1053  Gross per 24 hour   Intake 640 ml   Output 1 ml   Net 639 ml      Physical Exam  Vitals signs and nursing note reviewed.   Constitutional:       General: She is not in acute distress.     Appearance: She is well-developed. She is not diaphoretic.   HENT:      Head:  Normocephalic and atraumatic.      Right Ear: Hearing and external ear normal.      Left Ear: Hearing and external ear normal.      Nose: Nose normal.      Mouth/Throat:      Mouth: Mucous membranes are moist.      Pharynx: Oropharynx is clear. Uvula midline. No oropharyngeal exudate.   Eyes:      General: No scleral icterus.        Right eye: No discharge.         Left eye: No discharge.   Neck:      Musculoskeletal: Normal range of motion and neck supple.      Thyroid: No thyromegaly.      Trachea: No tracheal deviation.   Cardiovascular:      Rate and Rhythm: Normal rate and regular rhythm.      Heart sounds: Normal heart sounds. No murmur. No friction rub. No gallop.    Pulmonary:      Effort: Pulmonary effort is normal. No respiratory distress.      Breath sounds: Normal breath sounds. No stridor. No wheezing or rales.   Chest:      Chest wall: No tenderness.   Abdominal:      General: Bowel sounds are normal. There is no distension.      Palpations: Abdomen is soft.      Tenderness: There is no abdominal tenderness. There is no guarding or rebound.   Musculoskeletal: Normal range of motion.         General: No tenderness.      Comments: Left foot wrapped with gauze dressing.    Lymphadenopathy:      Cervical: No cervical adenopathy.   Skin:     General: Skin is warm and dry.      Coloration: Skin is not pale.      Findings: No erythema or rash.   Neurological:      Mental Status: She is alert and oriented to person, place, and time.      Cranial Nerves: No cranial nerve deficit.      Coordination: Coordination normal.   Psychiatric:         Behavior: Behavior normal.         Thought Content: Thought content normal.         Judgment: Judgment normal.         Significant Labs:   BMP:   Recent Labs   Lab 07/20/20  0405   *   *   K 4.4      CO2 20*   BUN 47*   CREATININE 2.4*   CALCIUM 9.1     CBC:   Recent Labs   Lab 07/19/20  0526   WBC 7.75   HGB 11.0*   HCT 34.1*          Significant  Imaging: I have reviewed all pertinent imaging results/findings within the past 24 hours.      Assessment/Plan:      * Acute osteomyelitis of left calcaneus  MRI confirms osteo. Bone Bx- 7/18  Appreciate consultants input.  Patient refusing amputation and wants to try IV ABx's.  ID consulted for ABx recommendations.  Vascular Surgery consulted and planning angiogram tomorrow.  Patient with contrast allergy.  Will pre medicate and hydrate.  CKD and will consult Nephrology.  Patient will need IV access for IV ABx's upon discharge.        Infection due to enterococcus        Stage 3 chronic kidney disease  No acute issues  Stable.   Nephrology consult.      Non healing left heel wound  As above.      S/P CABG x 1  No acute issues       Mixed hyperlipidemia  Stable      Chronic combined systolic and diastolic heart failure  No acute issues   EF 50% on recent echo   Gently hydrate prior to angiogram.      Essential hypertension  Continue current management.      Type 2 diabetes mellitus without complication, with long-term current use of insulin  Uncontrolled with hyperglycemia. Manifestations likely of CKD 3    Will check an A1c- > 14.        Peripheral artery disease  Angiogram as above.        VTE Risk Mitigation (From admission, onward)         Ordered     heparin (porcine) injection 5,000 Units  Every 8 hours      07/17/20 2217     IP VTE HIGH RISK PATIENT  Once      07/17/20 2217     Place sequential compression device  Until discontinued      07/17/20 2217     Place HAILEY hose  Until discontinued      07/17/20 2217                      Silvestre Espana MD  Department of Hospital Medicine   [facility]

## 2020-07-20 NOTE — PLAN OF CARE
Patient seen and examined this morning.  Left calcaneal wound with osteomyelitis.  CKD stage 3.  History of diabetes.  History of bilateral lower extremity angioplasty and stenting.  No bypass history.  Imaging reviewed.  Will obtain toe pressures and make further recommendations at that time.  Found to have an adequate flow to heal wound, will discuss CO2 angio limited contrast due to risk of kidney injury and chronic kidney disease with risk of further dialysis dependence.  No acute vascular surgical intervention indicated at this time.  Continue antibiotics wound care and offloading with glycemic control and nutrition optimization.  Full consult note to follow.

## 2020-07-20 NOTE — HPI
Teddy Huber MD [facility]                         07/20/2020    HPI:  Suyapa Connelly is a 54 y.o. female with   Patient Active Problem List   Diagnosis    Gangrene of toe    ROSLYN (acute kidney injury)    Peripheral artery disease    Right foot pain    Peripheral vascular disease    PAD (peripheral artery disease)    Hypokalemia, gastrointestinal losses    Acute encephalopathy    Type 2 diabetes mellitus without complication, with long-term current use of insulin    Panic attack as reaction to stress    Malnutrition of moderate degree    Essential hypertension    Vitamin D deficiency    CAROLIN (generalized anxiety disorder)    MDD (major depressive disorder), recurrent episode, moderate    Bereavement    Injury to kidney    Dehydration    Acute cystitis with hematuria    Syncope    Non-intractable vomiting with nausea    Hypertensive urgency    Shortness of breath    Chronic combined systolic and diastolic heart failure    Mixed hyperlipidemia    Dilated cardiomyopathy    Acute coronary syndrome    Acute on chronic combined systolic (congestive) and diastolic (congestive) heart failure    Type 2 diabetes mellitus with hyperglycemia, with long-term current use of insulin    Coronary artery disease involving native coronary artery of native heart without angina pectoris    Contrast dye induced nephropathy    Weakness    Fluid overload    S/P CABG x 1    Acute blood loss anemia    Paroxysmal atrial fibrillation    Alteration in skin integrity in adult    Preventive measure    Ischemic cardiomyopathy    Non healing left heel wound    Acute osteomyelitis of left calcaneus    Stage 3 chronic kidney disease    being managed by PCP and specialists who is here today for evaluation of left heel wound.  Patient states location is left heel occurring for several months.  She had a coronary bypass in January 2020 and was discharged  with a small wound on her heel.  She states that progressed in the interim.  Associated signs and symptoms include discoloration and skin breakdown with minimal pain.  Quality is dull and severity is 3/10.  Symptoms began few months ago.  Alleviating factors include wound care and offloading.  Worsening factors include pressure.    yes MI  no Stroke  Tobacco use: no    Past Medical History:   Diagnosis Date    Anxiety     Depression     Diabetes mellitus     type 2    GERD (gastroesophageal reflux disease)     Hyperlipemia     Hypertension     Myocardial infarction 2010    minor-caused by stress per pt.     Past Surgical History:   Procedure Laterality Date    Angiogram - Right Extremity Right 7/9/15    angiogram-left leg  10/6/15    CATHETERIZATION OF BOTH LEFT AND RIGHT HEART N/A 12/18/2019    Procedure: CATHETERIZATION, HEART, BOTH LEFT AND RIGHT;  Surgeon: Que Fernando III, MD;  Location: [facility] CATH LAB;  Service: Cardiology;  Laterality: N/A;    CORONARY ANGIOGRAPHY N/A 12/18/2019    Procedure: ANGIOGRAM, CORONARY ARTERY;  Surgeon: Que Fernando III, MD;  Location: [facility] CATH LAB;  Service: Cardiology;  Laterality: N/A;    CORONARY ARTERY BYPASS GRAFT (CABG) N/A 1/14/2020    Procedure: CORONARY ARTERY BYPASS GRAFT (CABG) x 1     Off Pump;  Surgeon: Huang Altamirano MD;  Location: [facility] OR [address] [place] [address];  Service: Cardiovascular;  Laterality: N/A;    INSERTION OF TUNNELED CENTRAL VENOUS HEMODIALYSIS CATHETER N/A 1/27/2020    Procedure: Insertion, Catheter, Central Venous, Hemodialysis;  Surgeon: ESTEBAN Gomez III, MD;  Location: [facility] CATH LAB;  Service: Peripheral Vascular;  Laterality: N/A;     Family History   Problem Relation Age of Onset    Anesthesia problems Neg Hx      Social History     Socioeconomic History    Marital status: Legally      Spouse name: Not on file    Number of children: Not on file    Years of education: Not on file    Highest education level: Not on  file   Occupational History    Not on file   Social Needs    Financial resource strain: Not on file    Food insecurity     Worry: Not on file     Inability: Not on file    Transportation needs     Medical: Not on file     Non-medical: Not on file   Tobacco Use    Smoking status: Never Smoker    Smokeless tobacco: Never Used   Substance and Sexual Activity    Alcohol use: No    Drug use: Yes     Types: Marijuana     Comment: used yesterday    Sexual activity: Not on file   Lifestyle    Physical activity     Days per week: Not on file     Minutes per session: Not on file    Stress: Not on file   Relationships    Social connections     Talks on phone: Not on file     Gets together: Not on file     Attends Catholic service: Not on file     Active member of club or organization: Not on file     Attends meetings of clubs or organizations: Not on file     Relationship status: Not on file   Other Topics Concern    Not on file   Social History Narrative    Not on file       Current Facility-Administered Medications:     acetaminophen tablet 650 mg, 650 mg, Oral, Q8H PRN, Aubrey Medrano MD    acetaminophen tablet 650 mg, 650 mg, Oral, Q8H PRN, Aubrey Medrano MD    amiodarone tablet 400 mg, 400 mg, Oral, Daily, Aubrey Medrano MD, 400 mg at 07/20/20 0927    aspirin chewable tablet 81 mg, 81 mg, Oral, Daily, Aubrey Medrano MD, 81 mg at 07/20/20 0927    atorvastatin tablet 80 mg, 80 mg, Oral, Daily, Aubrey Medrano MD, 80 mg at 07/20/20 0927    aztreonam 1 g in dextrose 5 % 50 mL IVPB (ready to mix system), 1,000 mg, Intravenous, Q8H, Aubrey Medrano MD, Last Rate: 100 mL/hr at 07/20/20 0920, 1,000 mg at 07/20/20 0920    carvediloL tablet 6.25 mg, 6.25 mg, Oral, Daily, Aubrey Medrano MD, 6.25 mg at 07/20/20 0927    ciprofloxacin (CIPRO)400mg/200ml D5W IVPB 400 mg, 400 mg, Intravenous, Q18H, Aubrey Medrano MD, Stopped at 07/18/20 1134    clopidogreL tablet 75 mg, 75 mg,  Oral, Daily, Aubrey Medrano MD, 75 mg at 07/20/20 0927    dextrose 50% injection 12.5 g, 12.5 g, Intravenous, PRN, Aubrey Medrano MD    diphenhydrAMINE capsule 25 mg, 25 mg, Oral, Q6H PRN, Hitesh Mason MD, 25 mg at 07/18/20 1255    gabapentin capsule 300 mg, 300 mg, Oral, TID, Aubrey Medrano MD, 300 mg at 07/20/20 0927    glucagon (human recombinant) injection 1 mg, 1 mg, Intramuscular, PRN, Aubrey Medrano MD    heparin (porcine) injection 5,000 Units, 5,000 Units, Subcutaneous, Q8H, Aubrey Medrano MD, 5,000 Units at 07/20/20 0528    insulin aspart U-100 pen 0-5 Units, 0-5 Units, Subcutaneous, Q6H PRN, Aubrey Medrano MD, 2 Units at 07/19/20 1215    insulin detemir U-100 pen 15 Units, 15 Units, Subcutaneous, Daily, Aubrey Medrano MD, 15 Units at 07/19/20 0823    melatonin tablet 6 mg, 6 mg, Oral, Nightly PRN, Aubery Medrano MD, 6 mg at 07/17/20 2334    morphine injection 2 mg, 2 mg, Intravenous, Q4H PRN, Aubrey Medrnao MD, 2 mg at 07/20/20 0859    ondansetron injection 8 mg, 8 mg, Intravenous, Q8H PRN, Aubrey Medrano MD    pantoprazole injection 40 mg, 40 mg, Intravenous, Daily, Aubrey Medrano MD, 40 mg at 07/20/20 0850    sodium chloride 0.9% flush 10 mL, 10 mL, Intravenous, PRN, Aubrey Medrano MD    Pharmacy to dose Vancomycin consult, , , Once **AND** vancomycin - pharmacy to dose, , Intravenous, pharmacy to manage frequency, Aubrey Medrano MD    vancomycin 500 mg in dextrose 5 % 100 mL IVPB (ready to mix system), 500 mg, Intravenous, Once, Silvestre Espana MD, Last Rate: 100 mL/hr at 07/20/20 1228, 500 mg at 07/20/20 1228

## 2020-07-20 NOTE — CONSULTS
Consulted for left heel wound  A 54 year old female admitted 7/17/20 from home with left calcaneus osteomyelitis secondary to non healing left diabetic foot ulcer; uncontrolled DM II; HLD; HTN; CAD S/P CABG x 1 vessel; compensated systolic and diastolic CHF  PMH: Anxiety; depression; DM II; GERD; HLD; HTN; MI; S/P CABG x1 1/14/20  3/31/20- Visited Vascular since had left Permacath placed 1/27/20 and now off HD 3 weeks- removed Permacath in office  Patient reports wound started as a blister in Jan 2020 after in a coma for 11 days following CABG; discharged to Rehab; has been treating wound herself at home  7/19 WBC 7.75 Hgb 11.0 Hct 34.1  7/17 Alb 3.0 A1C >14.0  7/18 Podiatry consult-   7/20 Vascular consult- found to have an adequate flow to heal wound  Photodocumentation in ER of heel    Left heel- Necrotic wound POA- photo from   7/18 Bedside debridement and bone biopsy per Podiatry.  Dressing orders written per Podiatry  Plan:  Will assist nursing as needed with wound management.

## 2020-07-20 NOTE — SUBJECTIVE & OBJECTIVE
Past Medical History:   Diagnosis Date    Anxiety     Depression     Diabetes mellitus     type 2    GERD (gastroesophageal reflux disease)     Hyperlipemia     Hypertension     Myocardial infarction 2010    minor-caused by stress per pt.       Past Surgical History:   Procedure Laterality Date    Angiogram - Right Extremity Right 7/9/15    angiogram-left leg  10/6/15    CATHETERIZATION OF BOTH LEFT AND RIGHT HEART N/A 12/18/2019    Procedure: CATHETERIZATION, HEART, BOTH LEFT AND RIGHT;  Surgeon: Que Fernando III, MD;  Location: [facility] CATH LAB;  Service: Cardiology;  Laterality: N/A;    CORONARY ANGIOGRAPHY N/A 12/18/2019    Procedure: ANGIOGRAM, CORONARY ARTERY;  Surgeon: Que Fernando III, MD;  Location: [facility] CATH LAB;  Service: Cardiology;  Laterality: N/A;    CORONARY ARTERY BYPASS GRAFT (CABG) N/A 1/14/2020    Procedure: CORONARY ARTERY BYPASS GRAFT (CABG) x 1     Off Pump;  Surgeon: Huang Altamirano MD;  Location: [facility] OR [address] [place] [address];  Service: Cardiovascular;  Laterality: N/A;    INSERTION OF TUNNELED CENTRAL VENOUS HEMODIALYSIS CATHETER N/A 1/27/2020    Procedure: Insertion, Catheter, Central Venous, Hemodialysis;  Surgeon: ESTEBAN Gomez III, MD;  Location: [facility] CATH LAB;  Service: Peripheral Vascular;  Laterality: N/A;       Review of patient's allergies indicates:   Allergen Reactions    Ciprofloxacin      Itchiness    Contrast media      Kidney injury    Pcn [penicillins]      Rash; tolerated ceftriaxone on 1/13/20       Medications:  Medications Prior to Admission   Medication Sig    acetaminophen (TYLENOL) 500 MG tablet Take 1,000 mg by mouth daily as needed for Pain.    atorvastatin (LIPITOR) 80 MG tablet Take 1 tablet (80 mg total) by mouth once daily.    carvediloL (COREG) 6.25 MG tablet Take 6.25 mg by mouth once daily. Pt thinks she take BID. Unsure.    clopidogreL (PLAVIX) 75 mg tablet Take 1 tablet (75 mg total) by mouth once daily.    escitalopram oxalate  (LEXAPRO) 10 MG tablet Take 1 tablet (10 mg total) by mouth once daily.    gabapentin (NEURONTIN) 300 MG capsule Take 1 capsule (300 mg total) by mouth 3 (three) times daily.    insulin aspart U-100 (NOVOLOG) 100 unit/mL injection Inject 9 Units into the skin 3 (three) times daily with meals.    insulin glargine 100 units/mL (3mL) SubQ pen Inject 10 Units into the skin every evening. (Patient taking differently: Inject 10 Units into the skin every evening. Lantus)    multivitamin (THERAGRAN) per tablet Take 1 tablet by mouth once daily.    ondansetron (ZOFRAN-ODT) 4 MG TbDL Take 1 tablet (4 mg total) by mouth every 12 (twelve) hours as needed (vomiting).    sevelamer carbonate (RENVELA) 800 mg Tab Take 1 tablet (800 mg total) by mouth 3 (three) times daily with meals.    torsemide (DEMADEX) 20 MG Tab     amiodarone (PACERONE) 400 MG tablet Take 1 tablet (400 mg total) by mouth once daily.    aspirin 81 MG Chew Take 1 tablet (81 mg total) by mouth once daily.    blood sugar diagnostic (ONETOUCH ULTRA BLUE TEST STRIP) Strp Use to test blood glucose twice daily (Patient taking differently: Tests 3 times daily)    blood-glucose meter Misc Use to test blood glucose    lancets 30 gauge Misc use to test blood glucose 2 (two) times daily with meals.     Antibiotics (From admission, onward)    Start     Stop Route Frequency Ordered    07/17/20 1945  aztreonam 1 g in dextrose 5 % 50 mL IVPB (ready to mix system)      -- IV Every 8 hours (non-standard times) 07/17/20 1839 07/17/20 1910  vancomycin - pharmacy to dose  (vancomycin IVPB)      -- IV pharmacy to manage frequency 07/17/20 1813 07/17/20 1830  ciprofloxacin (CIPRO)400mg/200ml D5W IVPB 400 mg      -- IV Every 18 hours 07/17/20 1813        Antifungals (From admission, onward)    None        Antivirals (From admission, onward)    None             There is no immunization history on file for this patient.    Family History     None        Social History      Socioeconomic History    Marital status: Legally      Spouse name: Not on file    Number of children: Not on file    Years of education: Not on file    Highest education level: Not on file   Occupational History    Not on file   Social Needs    Financial resource strain: Not on file    Food insecurity     Worry: Not on file     Inability: Not on file    Transportation needs     Medical: Not on file     Non-medical: Not on file   Tobacco Use    Smoking status: Never Smoker    Smokeless tobacco: Never Used   Substance and Sexual Activity    Alcohol use: No    Drug use: Yes     Types: Marijuana     Comment: used yesterday    Sexual activity: Not on file   Lifestyle    Physical activity     Days per week: Not on file     Minutes per session: Not on file    Stress: Not on file   Relationships    Social connections     Talks on phone: Not on file     Gets together: Not on file     Attends Scientologist service: Not on file     Active member of club or organization: Not on file     Attends meetings of clubs or organizations: Not on file     Relationship status: Not on file   Other Topics Concern    Not on file   Social History Narrative    Not on file     Review of Systems   Constitutional: Negative for chills, fatigue, fever and unexpected weight change.   HENT: Negative for ear pain, facial swelling, hearing loss, mouth sores, nosebleeds, rhinorrhea, sinus pressure, sore throat, tinnitus, trouble swallowing and voice change.    Eyes: Negative for photophobia, pain, redness and visual disturbance.   Respiratory: Negative for cough, chest tightness, shortness of breath and wheezing.    Cardiovascular: Negative for chest pain, palpitations and leg swelling.   Gastrointestinal: Negative for abdominal pain, blood in stool, constipation, diarrhea, nausea and vomiting.   Endocrine: Negative for cold intolerance, heat intolerance, polydipsia, polyphagia and polyuria.   Genitourinary: Negative for  decreased urine volume, dysuria, flank pain, frequency, hematuria, menstrual problem, urgency, vaginal bleeding, vaginal discharge and vaginal pain.   Musculoskeletal: Negative for arthralgias, back pain, joint swelling, myalgias and neck pain.   Skin: Positive for wound. Negative for rash.   Allergic/Immunologic: Negative for environmental allergies, food allergies and immunocompromised state.   Neurological: Negative for dizziness, seizures, syncope, weakness, light-headedness, numbness and headaches.   Hematological: Negative for adenopathy. Does not bruise/bleed easily.   Psychiatric/Behavioral: Negative for confusion, hallucinations, self-injury, sleep disturbance and suicidal ideas. The patient is not nervous/anxious.      Objective:     Vital Signs (Most Recent):  Temp: 98.1 °F (36.7 °C) (07/20/20 1047)  Pulse: 100 (07/20/20 1047)  Resp: 18 (07/20/20 1337)  BP: (!) 129/57 (07/20/20 1047)  SpO2: 97 % (07/20/20 1047) Vital Signs (24h Range):  Temp:  [98 °F (36.7 °C)-98.2 °F (36.8 °C)] 98.1 °F (36.7 °C)  Pulse:  [] 100  Resp:  [16-19] 18  SpO2:  [95 %-100 %] 97 %  BP: (107-145)/(57-68) 129/57     Weight: 87.5 kg (192 lb 14.4 oz)  Body mass index is 32.1 kg/m².    Estimated Creatinine Clearance: 29.6 mL/min (A) (based on SCr of 2.4 mg/dL (H)).    Physical Exam  Vitals signs and nursing note reviewed.   Constitutional:       General: She is not in acute distress.     Appearance: She is well-developed. She is not diaphoretic.   HENT:      Head: Normocephalic and atraumatic.      Right Ear: Hearing and external ear normal.      Left Ear: Hearing and external ear normal.      Nose: Nose normal.      Mouth/Throat:      Mouth: Mucous membranes are moist.      Pharynx: Oropharynx is clear. Uvula midline. No oropharyngeal exudate.   Eyes:      General: No scleral icterus.        Right eye: No discharge.         Left eye: No discharge.   Neck:      Musculoskeletal: Normal range of motion and neck supple.       Thyroid: No thyromegaly.      Trachea: No tracheal deviation.   Cardiovascular:      Rate and Rhythm: Normal rate and regular rhythm.      Heart sounds: Normal heart sounds. No murmur. No friction rub. No gallop.    Pulmonary:      Effort: Pulmonary effort is normal. No respiratory distress.      Breath sounds: Normal breath sounds. No stridor. No wheezing or rales.   Chest:      Chest wall: No tenderness.   Abdominal:      General: Bowel sounds are normal. There is no distension.      Palpations: Abdomen is soft.      Tenderness: There is no abdominal tenderness. There is no guarding or rebound.   Musculoskeletal: Normal range of motion.         General: No tenderness.      Comments: Left foot wrapped with gauze dressing.    Lymphadenopathy:      Cervical: No cervical adenopathy.   Skin:     General: Skin is warm and dry.      Coloration: Skin is not pale.      Findings: No erythema or rash.   Neurological:      Mental Status: She is alert and oriented to person, place, and time.      Cranial Nerves: No cranial nerve deficit.      Coordination: Coordination normal.   Psychiatric:         Behavior: Behavior normal.         Thought Content: Thought content normal.         Judgment: Judgment normal.         Significant Labs:   Blood Culture:   Recent Labs   Lab 07/17/20  1631 07/17/20  1712   LABBLOO No Growth to date  No Growth to date  No Growth to date No Growth to date  No Growth to date  No Growth to date     BMP:   Recent Labs   Lab 07/20/20  0405   *   *   K 4.4      CO2 20*   BUN 47*   CREATININE 2.4*   CALCIUM 9.1     CBC:   Recent Labs   Lab 07/19/20  0526   WBC 7.75   HGB 11.0*   HCT 34.1*        Wound Culture:   Recent Labs   Lab 07/18/20  0929   LABAERO ENTEROCOCCUS FAECALIS  Moderate  *  STREPTOCOCCUS AGALACTIAE (GROUP B)  Few  Beta-hemolytic streptococci are routinely susceptible to   penicillins,cephalosporins and carbapenems.  *  GRAM NEGATIVE YSABEL  Rare  Identification  and susceptibility pending  *       Significant Imaging: I have reviewed all pertinent imaging results/findings within the past 24 hours.

## 2020-07-20 NOTE — CONSULTS
[facility]  Infectious Disease  Consult Note    Patient Name: Suyapa Connelly  MRN: [id number]  Admission Date: 7/17/2020  Hospital Length of Stay: 3 days  Attending Physician: Silvestre Espana MD  Primary Care Provider: Erlinda Rahman NP     Isolation Status: No active isolations    Patient information was obtained from patient, past medical records and ER records.      Inpatient consult to Infectious Diseases  Consult performed by: Verenice Myles MD  Consult ordered by: Rosio Mayes DPM        Assessment/Plan:     * Acute osteomyelitis of left calcaneus  Osteomyelitis of the calcaneous presumed to be acute however pending pathology for confirmation. Cultures now with Enterococcus, GNR, and GBS.  · Discontinue aztreonam.   · OK to continue Cipro for now.   · Continue vancomycin.   · Patient likely to require IV antibiotics. Please discuss PICC vs subclavian catheter for IV antibiotics with nephrology.   · If GNR is sensitive for cephalosporins then will change Cipro.       Infection due to enterococcus  See above      Gram-negative infection  See above        Thank you for your consult. I will follow-up with patient. Please contact us if you have any additional questions.    Verenice Myles MD  Infectious Disease  [facility]    Subjective:     Principal Problem: Acute osteomyelitis of left calcaneus    HPI: A 54-year-old woman with CAD, HF-EF 35%, PAD, s/p bilateral lower extremity stents, DM 2,  Anxiety, depression, and chronic open wound of the left heel who developed malodorous drainage from her wound. This was associated with pain and hyperglycemia. She was evaluated on  ED where she was afebrile and without leukocytosis. Further work up revealed elevated creatinine level, an ESR of 139, and CRP of 9.2. MRI showed evidence of osteomyelitis of the calcaneous. She was evaluated by Podiatry and subsequently underwent bone biopsy for pathology and culture.  Cultures with Enterococcus pending identification and sensitivities.     Mrs. Connelly has allergy to penicillin, Bactrim, and clindamycin. She has a pet dog. She smoke marijuana occasionally. She enjoys fishing.     Infectious Diseases consulted for management recommendations.       Past Medical History:   Diagnosis Date    Anxiety     Depression     Diabetes mellitus     type 2    GERD (gastroesophageal reflux disease)     Hyperlipemia     Hypertension     Myocardial infarction 2010    minor-caused by stress per pt.       Past Surgical History:   Procedure Laterality Date    Angiogram - Right Extremity Right 7/9/15    angiogram-left leg  10/6/15    CATHETERIZATION OF BOTH LEFT AND RIGHT HEART N/A 12/18/2019    Procedure: CATHETERIZATION, HEART, BOTH LEFT AND RIGHT;  Surgeon: Que Fernando III, MD;  Location: [facility] CATH LAB;  Service: Cardiology;  Laterality: N/A;    CORONARY ANGIOGRAPHY N/A 12/18/2019    Procedure: ANGIOGRAM, CORONARY ARTERY;  Surgeon: Que Fernando III, MD;  Location: [facility] CATH LAB;  Service: Cardiology;  Laterality: N/A;    CORONARY ARTERY BYPASS GRAFT (CABG) N/A 1/14/2020    Procedure: CORONARY ARTERY BYPASS GRAFT (CABG) x 1     Off Pump;  Surgeon: Huang Altamirano MD;  Location: [facility] OR [address] [place] [address];  Service: Cardiovascular;  Laterality: N/A;    INSERTION OF TUNNELED CENTRAL VENOUS HEMODIALYSIS CATHETER N/A 1/27/2020    Procedure: Insertion, Catheter, Central Venous, Hemodialysis;  Surgeon: ESTEBAN Gomez III, MD;  Location: [facility] CATH LAB;  Service: Peripheral Vascular;  Laterality: N/A;       Review of patient's allergies indicates:   Allergen Reactions    Ciprofloxacin      Itchiness    Contrast media      Kidney injury    Pcn [penicillins]      Rash; tolerated ceftriaxone on 1/13/20       Medications:  Medications Prior to Admission   Medication Sig    acetaminophen (TYLENOL) 500 MG tablet Take 1,000 mg by mouth daily as needed for Pain.    atorvastatin (LIPITOR)  80 MG tablet Take 1 tablet (80 mg total) by mouth once daily.    carvediloL (COREG) 6.25 MG tablet Take 6.25 mg by mouth once daily. Pt thinks she take BID. Unsure.    clopidogreL (PLAVIX) 75 mg tablet Take 1 tablet (75 mg total) by mouth once daily.    escitalopram oxalate (LEXAPRO) 10 MG tablet Take 1 tablet (10 mg total) by mouth once daily.    gabapentin (NEURONTIN) 300 MG capsule Take 1 capsule (300 mg total) by mouth 3 (three) times daily.    insulin aspart U-100 (NOVOLOG) 100 unit/mL injection Inject 9 Units into the skin 3 (three) times daily with meals.    insulin glargine 100 units/mL (3mL) SubQ pen Inject 10 Units into the skin every evening. (Patient taking differently: Inject 10 Units into the skin every evening. Lantus)    multivitamin (THERAGRAN) per tablet Take 1 tablet by mouth once daily.    ondansetron (ZOFRAN-ODT) 4 MG TbDL Take 1 tablet (4 mg total) by mouth every 12 (twelve) hours as needed (vomiting).    sevelamer carbonate (RENVELA) 800 mg Tab Take 1 tablet (800 mg total) by mouth 3 (three) times daily with meals.    torsemide (DEMADEX) 20 MG Tab     amiodarone (PACERONE) 400 MG tablet Take 1 tablet (400 mg total) by mouth once daily.    aspirin 81 MG Chew Take 1 tablet (81 mg total) by mouth once daily.    blood sugar diagnostic (ONETOUCH ULTRA BLUE TEST STRIP) Strp Use to test blood glucose twice daily (Patient taking differently: Tests 3 times daily)    blood-glucose meter Misc Use to test blood glucose    lancets 30 gauge Misc use to test blood glucose 2 (two) times daily with meals.     Antibiotics (From admission, onward)    Start     Stop Route Frequency Ordered    07/17/20 1945  aztreonam 1 g in dextrose 5 % 50 mL IVPB (ready to mix system)      -- IV Every 8 hours (non-standard times) 07/17/20 1839 07/17/20 1910  vancomycin - pharmacy to dose  (vancomycin IVPB)      -- IV pharmacy to manage frequency 07/17/20 1813 07/17/20 1830  ciprofloxacin (CIPRO)400mg/200ml  D5W IVPB 400 mg      -- IV Every 18 hours 07/17/20 1813        Antifungals (From admission, onward)    None        Antivirals (From admission, onward)    None             There is no immunization history on file for this patient.    Family History     None        Social History     Socioeconomic History    Marital status: Legally      Spouse name: Not on file    Number of children: Not on file    Years of education: Not on file    Highest education level: Not on file   Occupational History    Not on file   Social Needs    Financial resource strain: Not on file    Food insecurity     Worry: Not on file     Inability: Not on file    Transportation needs     Medical: Not on file     Non-medical: Not on file   Tobacco Use    Smoking status: Never Smoker    Smokeless tobacco: Never Used   Substance and Sexual Activity    Alcohol use: No    Drug use: Yes     Types: Marijuana     Comment: used yesterday    Sexual activity: Not on file   Lifestyle    Physical activity     Days per week: Not on file     Minutes per session: Not on file    Stress: Not on file   Relationships    Social connections     Talks on phone: Not on file     Gets together: Not on file     Attends Bahai service: Not on file     Active member of club or organization: Not on file     Attends meetings of clubs or organizations: Not on file     Relationship status: Not on file   Other Topics Concern    Not on file   Social History Narrative    Not on file     Review of Systems   Constitutional: Negative for chills, fatigue, fever and unexpected weight change.   HENT: Negative for ear pain, facial swelling, hearing loss, mouth sores, nosebleeds, rhinorrhea, sinus pressure, sore throat, tinnitus, trouble swallowing and voice change.    Eyes: Negative for photophobia, pain, redness and visual disturbance.   Respiratory: Negative for cough, chest tightness, shortness of breath and wheezing.    Cardiovascular: Negative for chest  pain, palpitations and leg swelling.   Gastrointestinal: Negative for abdominal pain, blood in stool, constipation, diarrhea, nausea and vomiting.   Endocrine: Negative for cold intolerance, heat intolerance, polydipsia, polyphagia and polyuria.   Genitourinary: Negative for decreased urine volume, dysuria, flank pain, frequency, hematuria, menstrual problem, urgency, vaginal bleeding, vaginal discharge and vaginal pain.   Musculoskeletal: Negative for arthralgias, back pain, joint swelling, myalgias and neck pain.   Skin: Positive for wound. Negative for rash.   Allergic/Immunologic: Negative for environmental allergies, food allergies and immunocompromised state.   Neurological: Negative for dizziness, seizures, syncope, weakness, light-headedness, numbness and headaches.   Hematological: Negative for adenopathy. Does not bruise/bleed easily.   Psychiatric/Behavioral: Negative for confusion, hallucinations, self-injury, sleep disturbance and suicidal ideas. The patient is not nervous/anxious.      Objective:     Vital Signs (Most Recent):  Temp: 98.1 °F (36.7 °C) (07/20/20 1047)  Pulse: 100 (07/20/20 1047)  Resp: 18 (07/20/20 1337)  BP: (!) 129/57 (07/20/20 1047)  SpO2: 97 % (07/20/20 1047) Vital Signs (24h Range):  Temp:  [98 °F (36.7 °C)-98.2 °F (36.8 °C)] 98.1 °F (36.7 °C)  Pulse:  [] 100  Resp:  [16-19] 18  SpO2:  [95 %-100 %] 97 %  BP: (107-145)/(57-68) 129/57     Weight: 87.5 kg (192 lb 14.4 oz)  Body mass index is 32.1 kg/m².    Estimated Creatinine Clearance: 29.6 mL/min (A) (based on SCr of 2.4 mg/dL (H)).    Physical Exam  Vitals signs and nursing note reviewed.   Constitutional:       General: She is not in acute distress.     Appearance: She is well-developed. She is not diaphoretic.   HENT:      Head: Normocephalic and atraumatic.      Right Ear: Hearing and external ear normal.      Left Ear: Hearing and external ear normal.      Nose: Nose normal.      Mouth/Throat:      Mouth: Mucous  membranes are moist.      Pharynx: Oropharynx is clear. Uvula midline. No oropharyngeal exudate.   Eyes:      General: No scleral icterus.        Right eye: No discharge.         Left eye: No discharge.   Neck:      Musculoskeletal: Normal range of motion and neck supple.      Thyroid: No thyromegaly.      Trachea: No tracheal deviation.   Cardiovascular:      Rate and Rhythm: Normal rate and regular rhythm.      Heart sounds: Normal heart sounds. No murmur. No friction rub. No gallop.    Pulmonary:      Effort: Pulmonary effort is normal. No respiratory distress.      Breath sounds: Normal breath sounds. No stridor. No wheezing or rales.   Chest:      Chest wall: No tenderness.   Abdominal:      General: Bowel sounds are normal. There is no distension.      Palpations: Abdomen is soft.      Tenderness: There is no abdominal tenderness. There is no guarding or rebound.   Musculoskeletal: Normal range of motion.         General: No tenderness.      Comments: Left foot wrapped with gauze dressing.    Lymphadenopathy:      Cervical: No cervical adenopathy.   Skin:     General: Skin is warm and dry.      Coloration: Skin is not pale.      Findings: No erythema or rash.   Neurological:      Mental Status: She is alert and oriented to person, place, and time.      Cranial Nerves: No cranial nerve deficit.      Coordination: Coordination normal.   Psychiatric:         Behavior: Behavior normal.         Thought Content: Thought content normal.         Judgment: Judgment normal.         Significant Labs:   Blood Culture:   Recent Labs   Lab 07/17/20  1631 07/17/20  1712   LABBLOO No Growth to date  No Growth to date  No Growth to date No Growth to date  No Growth to date  No Growth to date     BMP:   Recent Labs   Lab 07/20/20  0405   *   *   K 4.4      CO2 20*   BUN 47*   CREATININE 2.4*   CALCIUM 9.1     CBC:   Recent Labs   Lab 07/19/20  0526   WBC 7.75   HGB 11.0*   HCT 34.1*        Wound  Culture:   Recent Labs   Lab 07/18/20  0929   LABAERO ENTEROCOCCUS FAECALIS  Moderate  *  STREPTOCOCCUS AGALACTIAE (GROUP B)  Few  Beta-hemolytic streptococci are routinely susceptible to   penicillins,cephalosporins and carbapenems.  *  GRAM NEGATIVE YSABEL  Rare  Identification and susceptibility pending  *       Significant Imaging: I have reviewed all pertinent imaging results/findings within the past 24 hours.

## 2020-07-20 NOTE — CONSULTS
[facility]  Vascular Surgery  Consult Note    Inpatient consult to Vascular Surgery  Consult performed by: Teddy Huber MD  Consult ordered by: Rosio Mayes DPM        Subjective:     Chief Complaint/Reason for Admission: L heel wound with osteo    History of Present Illness:             Teddy Huber MD [facility]                         07/20/2020    HPI:  Suyapa Connelly is a 54 y.o. female with   Patient Active Problem List   Diagnosis    Gangrene of toe    ROSLYN (acute kidney injury)    Peripheral artery disease    Right foot pain    Peripheral vascular disease    PAD (peripheral artery disease)    Hypokalemia, gastrointestinal losses    Acute encephalopathy    Type 2 diabetes mellitus without complication, with long-term current use of insulin    Panic attack as reaction to stress    Malnutrition of moderate degree    Essential hypertension    Vitamin D deficiency    CAROLIN (generalized anxiety disorder)    MDD (major depressive disorder), recurrent episode, moderate    Bereavement    Injury to kidney    Dehydration    Acute cystitis with hematuria    Syncope    Non-intractable vomiting with nausea    Hypertensive urgency    Shortness of breath    Chronic combined systolic and diastolic heart failure    Mixed hyperlipidemia    Dilated cardiomyopathy    Acute coronary syndrome    Acute on chronic combined systolic (congestive) and diastolic (congestive) heart failure    Type 2 diabetes mellitus with hyperglycemia, with long-term current use of insulin    Coronary artery disease involving native coronary artery of native heart without angina pectoris    Contrast dye induced nephropathy    Weakness    Fluid overload    S/P CABG x 1    Acute blood loss anemia    Paroxysmal atrial fibrillation    Alteration in skin integrity in adult    Preventive measure    Ischemic cardiomyopathy    Non healing left  heel wound    Acute osteomyelitis of left calcaneus    Stage 3 chronic kidney disease    being managed by PCP and specialists who is here today for evaluation of left heel wound.  Patient states location is left heel occurring for several months.  She had a coronary bypass in January 2020 and was discharged with a small wound on her heel.  She states that progressed in the interim.  Associated signs and symptoms include discoloration and skin breakdown with minimal pain.  Quality is dull and severity is 3/10.  Symptoms began few months ago.  Alleviating factors include wound care and offloading.  Worsening factors include pressure.    2015 s/p R pop angioplasty, L SFA PTA, L pop stent 5x100 BMS, R pop angioplasty, R pop stent 5x100    yes MI  no Stroke  Tobacco use: no    Past Medical History:   Diagnosis Date    Anxiety     Depression     Diabetes mellitus     type 2    GERD (gastroesophageal reflux disease)     Hyperlipemia     Hypertension     Myocardial infarction 2010    minor-caused by stress per pt.     Past Surgical History:   Procedure Laterality Date    Angiogram - Right Extremity Right 7/9/15    angiogram-left leg  10/6/15    CATHETERIZATION OF BOTH LEFT AND RIGHT HEART N/A 12/18/2019    Procedure: CATHETERIZATION, HEART, BOTH LEFT AND RIGHT;  Surgeon: Que Fernando III, MD;  Location: [facility] CATH LAB;  Service: Cardiology;  Laterality: N/A;    CORONARY ANGIOGRAPHY N/A 12/18/2019    Procedure: ANGIOGRAM, CORONARY ARTERY;  Surgeon: Que Fernando III, MD;  Location: [facility] CATH LAB;  Service: Cardiology;  Laterality: N/A;    CORONARY ARTERY BYPASS GRAFT (CABG) N/A 1/14/2020    Procedure: CORONARY ARTERY BYPASS GRAFT (CABG) x 1     Off Pump;  Surgeon: Huang Altamirano MD;  Location: [address];  Service: Cardiovascular;  Laterality: N/A;    INSERTION OF TUNNELED CENTRAL VENOUS HEMODIALYSIS CATHETER N/A 1/27/2020    Procedure: Insertion, Catheter, Central Venous, Hemodialysis;   Surgeon: ESTEBAN Gomez III, MD;  Location: [facility] CATH LAB;  Service: Peripheral Vascular;  Laterality: N/A;     Family History   Problem Relation Age of Onset    Anesthesia problems Neg Hx      Social History     Socioeconomic History    Marital status: Legally      Spouse name: Not on file    Number of children: Not on file    Years of education: Not on file    Highest education level: Not on file   Occupational History    Not on file   Social Needs    Financial resource strain: Not on file    Food insecurity     Worry: Not on file     Inability: Not on file    Transportation needs     Medical: Not on file     Non-medical: Not on file   Tobacco Use    Smoking status: Never Smoker    Smokeless tobacco: Never Used   Substance and Sexual Activity    Alcohol use: No    Drug use: Yes     Types: Marijuana     Comment: used yesterday    Sexual activity: Not on file   Lifestyle    Physical activity     Days per week: Not on file     Minutes per session: Not on file    Stress: Not on file   Relationships    Social connections     Talks on phone: Not on file     Gets together: Not on file     Attends Mandaen service: Not on file     Active member of club or organization: Not on file     Attends meetings of clubs or organizations: Not on file     Relationship status: Not on file   Other Topics Concern    Not on file   Social History Narrative    Not on file       Current Facility-Administered Medications:     acetaminophen tablet 650 mg, 650 mg, Oral, Q8H PRN, Aubrey Medrano MD    acetaminophen tablet 650 mg, 650 mg, Oral, Q8H PRN, Aubrey Medrano MD    amiodarone tablet 400 mg, 400 mg, Oral, Daily, Aubrey Medrano MD, 400 mg at 07/20/20 0927    aspirin chewable tablet 81 mg, 81 mg, Oral, Daily, Aubrey Medrano MD, 81 mg at 07/20/20 0927    atorvastatin tablet 80 mg, 80 mg, Oral, Daily, Aubrey Medrano MD, 80 mg at 07/20/20 0927    aztreonam 1 g in dextrose 5 % 50 mL  IVPB (ready to mix system), 1,000 mg, Intravenous, Q8H, Aubrey Medrano MD, Last Rate: 100 mL/hr at 07/20/20 0920, 1,000 mg at 07/20/20 0920    carvediloL tablet 6.25 mg, 6.25 mg, Oral, Daily, Aubrey Medrano MD, 6.25 mg at 07/20/20 0927    ciprofloxacin (CIPRO)400mg/200ml D5W IVPB 400 mg, 400 mg, Intravenous, Q18H, Aubrey Medrano MD, Stopped at 07/18/20 1134    clopidogreL tablet 75 mg, 75 mg, Oral, Daily, Aubrey Medrano MD, 75 mg at 07/20/20 0927    dextrose 50% injection 12.5 g, 12.5 g, Intravenous, PRN, Aubrey Medrano MD    diphenhydrAMINE capsule 25 mg, 25 mg, Oral, Q6H PRN, Hitesh Mason MD, 25 mg at 07/18/20 1255    gabapentin capsule 300 mg, 300 mg, Oral, TID, Aubrey Medrano MD, 300 mg at 07/20/20 0927    glucagon (human recombinant) injection 1 mg, 1 mg, Intramuscular, PRN, Aubrey Medrano MD    heparin (porcine) injection 5,000 Units, 5,000 Units, Subcutaneous, Q8H, Aubrey Medrano MD, 5,000 Units at 07/20/20 0528    insulin aspart U-100 pen 0-5 Units, 0-5 Units, Subcutaneous, Q6H PRN, Aubrey Medrano MD, 2 Units at 07/19/20 1215    insulin detemir U-100 pen 15 Units, 15 Units, Subcutaneous, Daily, Aubrey Medrano MD, 15 Units at 07/19/20 0823    melatonin tablet 6 mg, 6 mg, Oral, Nightly PRN, Aubrey Medrano MD, 6 mg at 07/17/20 2334    morphine injection 2 mg, 2 mg, Intravenous, Q4H PRN, Aubrey Medrano MD, 2 mg at 07/20/20 0859    ondansetron injection 8 mg, 8 mg, Intravenous, Q8H PRN, Aubrey Medrano MD    pantoprazole injection 40 mg, 40 mg, Intravenous, Daily, Aubrey Medrano MD, 40 mg at 07/20/20 0850    sodium chloride 0.9% flush 10 mL, 10 mL, Intravenous, PRN, Aubrey Medrano MD    Pharmacy to dose Vancomycin consult, , , Once **AND** vancomycin - pharmacy to dose, , Intravenous, pharmacy to manage frequency, Aubrey Medrano MD    vancomycin 500 mg in dextrose 5 % 100 mL IVPB (ready to mix system), 500 mg,  Intravenous, Once, Silvestre Espana MD, Last Rate: 100 mL/hr at 07/20/20 1228, 500 mg at 07/20/20 1228      Medications Prior to Admission   Medication Sig Dispense Refill Last Dose    acetaminophen (TYLENOL) 500 MG tablet Take 1,000 mg by mouth daily as needed for Pain.   7/16/2020 at Unknown time    atorvastatin (LIPITOR) 80 MG tablet Take 1 tablet (80 mg total) by mouth once daily. 90 tablet 3 7/16/2020 at Unknown time    carvediloL (COREG) 6.25 MG tablet Take 6.25 mg by mouth once daily. Pt thinks she take BID. Unsure.   7/16/2020 at Unknown time    clopidogreL (PLAVIX) 75 mg tablet Take 1 tablet (75 mg total) by mouth once daily. 30 tablet 11 7/16/2020 at Unknown time    escitalopram oxalate (LEXAPRO) 10 MG tablet Take 1 tablet (10 mg total) by mouth once daily. 30 tablet 0 7/16/2020 at Unknown time    gabapentin (NEURONTIN) 300 MG capsule Take 1 capsule (300 mg total) by mouth 3 (three) times daily. 90 capsule 5 7/16/2020 at Unknown time    insulin aspart U-100 (NOVOLOG) 100 unit/mL injection Inject 9 Units into the skin 3 (three) times daily with meals. 10 mL 0 7/17/2020 at Unknown time    insulin glargine 100 units/mL (3mL) SubQ pen Inject 10 Units into the skin every evening. (Patient taking differently: Inject 10 Units into the skin every evening. Lantus) 9 mL 1 7/16/2020 at Unknown time    multivitamin (THERAGRAN) per tablet Take 1 tablet by mouth once daily.   7/16/2020 at Unknown time    ondansetron (ZOFRAN-ODT) 4 MG TbDL Take 1 tablet (4 mg total) by mouth every 12 (twelve) hours as needed (vomiting). 10 tablet 0 Past Week at Unknown time    sevelamer carbonate (RENVELA) 800 mg Tab Take 1 tablet (800 mg total) by mouth 3 (three) times daily with meals. 90 tablet 11 7/16/2020 at Unknown time    torsemide (DEMADEX) 20 MG Tab    Past Month at Unknown time    amiodarone (PACERONE) 400 MG tablet Take 1 tablet (400 mg total) by mouth once daily. 30 tablet 11 Unknown at Unknown time    aspirin  81 MG Chew Take 1 tablet (81 mg total) by mouth once daily. 90 tablet 3 Unknown at Unknown time    blood sugar diagnostic (ONETOUCH ULTRA BLUE TEST STRIP) Strp Use to test blood glucose twice daily (Patient taking differently: Tests 3 times daily) 100 each 11     blood-glucose meter Misc Use to test blood glucose 1 each 0     lancets 30 gauge Misc use to test blood glucose 2 (two) times daily with meals. 100 each 11        Review of patient's allergies indicates:   Allergen Reactions    Ciprofloxacin      Itchiness    Contrast media      Kidney injury    Pcn [penicillins]      Rash; tolerated ceftriaxone on 1/13/20       Past Medical History:   Diagnosis Date    Anxiety     Depression     Diabetes mellitus     type 2    GERD (gastroesophageal reflux disease)     Hyperlipemia     Hypertension     Myocardial infarction 2010    minor-caused by stress per pt.     Past Surgical History:   Procedure Laterality Date    Angiogram - Right Extremity Right 7/9/15    angiogram-left leg  10/6/15    CATHETERIZATION OF BOTH LEFT AND RIGHT HEART N/A 12/18/2019    Procedure: CATHETERIZATION, HEART, BOTH LEFT AND RIGHT;  Surgeon: Que Fernando III, MD;  Location: [facility] CATH LAB;  Service: Cardiology;  Laterality: N/A;    CORONARY ANGIOGRAPHY N/A 12/18/2019    Procedure: ANGIOGRAM, CORONARY ARTERY;  Surgeon: Que Fernando III, MD;  Location: [facility] CATH LAB;  Service: Cardiology;  Laterality: N/A;    CORONARY ARTERY BYPASS GRAFT (CABG) N/A 1/14/2020    Procedure: CORONARY ARTERY BYPASS GRAFT (CABG) x 1     Off Pump;  Surgeon: Huang Altamirano MD;  Location: [address];  Service: Cardiovascular;  Laterality: N/A;    INSERTION OF TUNNELED CENTRAL VENOUS HEMODIALYSIS CATHETER N/A 1/27/2020    Procedure: Insertion, Catheter, Central Venous, Hemodialysis;  Surgeon: ESTEBAN Gomez III, MD;  Location: [facility] CATH LAB;  Service: Peripheral Vascular;  Laterality: N/A;     Family History     None        Tobacco  Use    Smoking status: Never Smoker    Smokeless tobacco: Never Used   Substance and Sexual Activity    Alcohol use: No    Drug use: Yes     Types: Marijuana     Comment: used yesterday    Sexual activity: Not on file     Review of Systems   Constitutional: Negative for chills and fever.   HENT: Negative for congestion.    Eyes: Negative for visual disturbance.   Respiratory: Negative for shortness of breath.    Cardiovascular: Negative for chest pain.   Gastrointestinal: Negative for abdominal distention.   Endocrine: Negative for cold intolerance.   Genitourinary: Negative for flank pain.   Musculoskeletal: Negative for back pain.   Skin: Positive for color change and wound. Negative for pallor and rash.   Allergic/Immunologic: Negative for immunocompromised state.   Neurological: Negative for dizziness.   Hematological: Does not bruise/bleed easily.   Psychiatric/Behavioral: Negative for agitation.     Objective:     Vital Signs (Most Recent):  Temp: 98.1 °F (36.7 °C) (07/20/20 1047)  Pulse: 100 (07/20/20 1047)  Resp: 18 (07/20/20 1047)  BP: (!) 129/57 (07/20/20 1047)  SpO2: 97 % (07/20/20 1047) Vital Signs (24h Range):  Temp:  [98 °F (36.7 °C)-98.2 °F (36.8 °C)] 98.1 °F (36.7 °C)  Pulse:  [] 100  Resp:  [16-19] 18  SpO2:  [95 %-100 %] 97 %  BP: (107-145)/(57-68) 129/57     Weight: 87.5 kg (192 lb 14.4 oz)  Body mass index is 32.1 kg/m².    Physical Exam  Vitals signs reviewed.   Constitutional:       General: She is not in acute distress.     Appearance: She is well-developed. She is not diaphoretic.   HENT:      Head: Normocephalic and atraumatic.   Eyes:      Conjunctiva/sclera: Conjunctivae normal.   Neck:      Musculoskeletal: Neck supple.   Cardiovascular:      Rate and Rhythm: Normal rate.      Pulses:           Femoral pulses are 2+ on the right side and 2+ on the left side.       Popliteal pulses are 2+ on the right side and 2+ on the left side.        Dorsalis pedis pulses are detected w/  Doppler on the right side and detected w/ Doppler on the left side.        Posterior tibial pulses are detected w/ Doppler on the right side and detected w/ Doppler on the left side.   Pulmonary:      Effort: Pulmonary effort is normal.   Abdominal:      General: There is no distension.      Palpations: Abdomen is soft. There is no mass.      Tenderness: There is no abdominal tenderness. There is no guarding or rebound.      Hernia: No hernia is present.   Musculoskeletal: Normal range of motion.         General: No deformity.   Feet:      Left foot:      Skin integrity: Ulcer and skin breakdown present.   Skin:     General: Skin is warm and dry.      Findings: No rash.   Neurological:      General: No focal deficit present.      Mental Status: She is alert and oriented to person, place, and time.   Psychiatric:         Mood and Affect: Mood normal.         Significant Labs:  All pertinent labs from the last 24 hours have been reviewed.    Significant Diagnostics:  I have reviewed all pertinent imaging results/findings within the past 24 hours.    Assessment/Plan:     Non healing left heel wound  -Rec toe pressures  -Arterial US reviewed.  Will review toe pressures and make final recs at that time.  Rec Nephrology consult to assist with renal dysfunction as angiography will likely be recommended for limb salvage.  Will discuss risks/benefits with pt including worsening renal function with CO2 angio and limited contrast.  -Cont offloading, wound care, optimization of comorbidities and nutrition         Thank you for your consult. I will follow-up with patient. Please contact us if you have any additional questions.    Teddy Huber MD  Vascular Surgery  [facility]

## 2020-07-20 NOTE — ASSESSMENT & PLAN NOTE
-Rec toe pressures  -Arterial US reviewed.  Will review toe pressures and make final recs at that time.  Rec Nephrology consult to assist with renal dysfunction as angiography will likely be recommended for limb salvage.  Will discuss risks/benefits with pt including worsening renal function with CO2 angio and limited contrast.  -Cont offloading, wound care, optimization of comorbidities and nutrition

## 2020-07-20 NOTE — NURSING
1905 Received bedside report from DEANGELO Bey. Patient alert and oriented. Still have pain 8/10 -not due yet for PRN pain meds. Patient stated pain getting better. No sign of distress. Call bell given on her reach, instructed to call for nurse attention all the time. Bed on lowest position. Bed alarm on. Safety maintained.

## 2020-07-20 NOTE — PROGRESS NOTES
Pharmacokinetic Assessment Follow Up: IV Vancomycin    Vancomycin serum concentration assessment(s):    The random level was drawn correctly and can be used to guide therapy at this time. The measurement is within the desired definitive target range of 10 to 20 mcg/mL.    Vancomycin Regimen Plan:    Give 500 mg today.  Re-dose when the random level is less than 20 mcg/mL, next level to be drawn at 0400 on 7/21/2020    Drug levels (last 3 results):  Recent Labs   Lab Result Units 07/18/20  0428 07/19/20  0526 07/20/20  0405   Vancomycin, Random ug/mL 19.1 25.9 19.1       Pharmacy will continue to follow and monitor vancomycin.    Please contact pharmacy at extension [phone] for questions regarding this assessment.    Thank you for the consult,   Abdirashid Canela Jr       Patient brief summary:  Suyapa Connelly is a 54 y.o. female initiated on antimicrobial therapy with IV Vancomycin for treatment of bone/joint infection    Drug Allergies:   Review of patient's allergies indicates:   Allergen Reactions    Ciprofloxacin      Itchiness    Contrast media      Kidney injury    Pcn [penicillins]      Rash; tolerated ceftriaxone on 1/13/20       Actual Body Weight:   87.5 kg    Renal Function:   Estimated Creatinine Clearance: 29.6 mL/min (A) (based on SCr of 2.4 mg/dL (H)).,     Dialysis Method (if applicable):  N/A    CBC (last 72 hours):  Recent Labs   Lab Result Units 07/17/20  1631 07/18/20  0428 07/19/20  0526   WBC K/uL 9.79 8.98 7.75   Hemoglobin g/dL 11.3* 11.4* 11.0*   Hemoglobin A1C % >14.0*  --   --    Hematocrit % 35.4* 35.0* 34.1*   Platelets K/uL 370* 349 338   Gran% % 76.5* 66.6 55.5   Lymph% % 16.8* 23.2 31.9   Mono% % 5.5 8.2 9.8   Eosinophil% % 0.2 1.1 1.9   Basophil% % 0.6 0.6 0.8   Differential Method  Automated Automated Automated       Metabolic Panel (last 72 hours):  Recent Labs   Lab Result Units 07/17/20  1631 07/17/20  1815 07/18/20  0428 07/19/20  0526 07/20/20  0405   Sodium mmol/L 130*  --   132* 132* 133*   Potassium mmol/L 4.3  --  4.4 4.2 4.4   Chloride mmol/L 100  --  104 105 104   CO2 mmol/L 18*  --  17* 18* 20*   Glucose mg/dL 483*  --  211* 114* 118*   Glucose, UA   --  3+*  --   --   --    BUN, Bld mg/dL 38*  --  37* 39* 47*   Creatinine mg/dL 2.5*  --  2.0* 2.2* 2.4*   Albumin g/dL 3.0*  --   --   --   --    Total Bilirubin mg/dL 0.5  --   --   --   --    Alkaline Phosphatase U/L 115  --   --   --   --    AST U/L 11  --   --   --   --    ALT U/L <5*  --   --   --   --    Magnesium mg/dL 2.1  --   --   --   --        Vancomycin Administrations:  vancomycin given in the last 96 hours                     vancomycin in dextrose 5 % 1 gram/250 mL IVPB 1,000 mg (mg) 1,000 mg New Bag 07/18/20 1711    vancomycin 1.25 g in dextrose 5% 250 mL IVPB (ready to mix) ()  Restarted 07/18/20 0042     1,250 mg New Bag 07/17/20 2208                    Microbiologic Results:  Microbiology Results (last 7 days)       Procedure Component Value Units Date/Time    Blood Culture #2 **CANNOT BE ORDERED STAT** [[id number]] Collected: 07/17/20 1712    Order Status: Completed Specimen: Blood from Peripheral, Hand, Left Updated: 07/19/20 1903     Blood Culture, Routine No Growth to date      No Growth to date      No Growth to date    Blood Culture #1 **CANNOT BE ORDERED STAT** [[id number]] Collected: 07/17/20 1631    Order Status: Completed Specimen: Blood from Peripheral, Antecubital, Right Updated: 07/19/20 1903     Blood Culture, Routine No Growth to date      No Growth to date      No Growth to date    Aerobic culture [[id number]]  (Abnormal) Collected: 07/18/20 0929    Order Status: Completed Specimen: Bone from Foot, Left Updated: 07/19/20 0646     Aerobic Bacterial Culture ENTEROCOCCUS SPECIES  Moderate  Identification and susceptibility pending      Gram stain [[id number]] Collected: 07/18/20 0929    Order Status: Completed Specimen: Bone from Foot, Left Updated: 07/19/20 0346     Gram Stain Result No WBC's      No  organisms seen

## 2020-07-20 NOTE — NURSING
Report Recieved from off going nurse Trupti RN. Patient is Awake and alert on Room Air, no distress noted, Bed in lowest position, wheels locked, call light in reach. Will continue to monitor

## 2020-07-20 NOTE — ASSESSMENT & PLAN NOTE
MRI confirms osteo. Bone Bx- 7/18  Appreciate consultants input.  Patient refusing amputation and wants to try IV ABx's.  ID consulted for ABx recommendations.  Vascular Surgery consulted and planning angiogram tomorrow.  Patient with contrast allergy.  Will pre medicate and hydrate.  CKD and will consult Nephrology.  Patient will need IV access for IV ABx's upon discharge.

## 2020-07-20 NOTE — HPI
A 54-year-old woman with CAD, HF-EF 35%, PAD, s/p bilateral lower extremity stents, DM 2,  Anxiety, depression, and chronic open wound of the left heel who developed malodorous drainage from her wound. This was associated with pain and hyperglycemia. She was evaluated on [facility] ED where she was afebrile and without leukocytosis. Further work up revealed elevated creatinine level, an ESR of 139, and CRP of 9.2. MRI showed evidence of osteomyelitis of the calcaneous. She was evaluated by Podiatry and subsequently underwent bone biopsy for pathology and culture. Cultures with Enterococcus pending identification and sensitivities.     Mrs. Connelly has allergy to penicillin, Bactrim, and clindamycin. She has a pet dog. She smoke marijuana occasionally. She enjoys fishing.     Infectious Diseases consulted for management recommendations.

## 2020-07-20 NOTE — SUBJECTIVE & OBJECTIVE
Medications Prior to Admission   Medication Sig Dispense Refill Last Dose    acetaminophen (TYLENOL) 500 MG tablet Take 1,000 mg by mouth daily as needed for Pain.   7/16/2020 at Unknown time    atorvastatin (LIPITOR) 80 MG tablet Take 1 tablet (80 mg total) by mouth once daily. 90 tablet 3 7/16/2020 at Unknown time    carvediloL (COREG) 6.25 MG tablet Take 6.25 mg by mouth once daily. Pt thinks she take BID. Unsure.   7/16/2020 at Unknown time    clopidogreL (PLAVIX) 75 mg tablet Take 1 tablet (75 mg total) by mouth once daily. 30 tablet 11 7/16/2020 at Unknown time    escitalopram oxalate (LEXAPRO) 10 MG tablet Take 1 tablet (10 mg total) by mouth once daily. 30 tablet 0 7/16/2020 at Unknown time    gabapentin (NEURONTIN) 300 MG capsule Take 1 capsule (300 mg total) by mouth 3 (three) times daily. 90 capsule 5 7/16/2020 at Unknown time    insulin aspart U-100 (NOVOLOG) 100 unit/mL injection Inject 9 Units into the skin 3 (three) times daily with meals. 10 mL 0 7/17/2020 at Unknown time    insulin glargine 100 units/mL (3mL) SubQ pen Inject 10 Units into the skin every evening. (Patient taking differently: Inject 10 Units into the skin every evening. Lantus) 9 mL 1 7/16/2020 at Unknown time    multivitamin (THERAGRAN) per tablet Take 1 tablet by mouth once daily.   7/16/2020 at Unknown time    ondansetron (ZOFRAN-ODT) 4 MG TbDL Take 1 tablet (4 mg total) by mouth every 12 (twelve) hours as needed (vomiting). 10 tablet 0 Past Week at Unknown time    sevelamer carbonate (RENVELA) 800 mg Tab Take 1 tablet (800 mg total) by mouth 3 (three) times daily with meals. 90 tablet 11 7/16/2020 at Unknown time    torsemide (DEMADEX) 20 MG Tab    Past Month at Unknown time    amiodarone (PACERONE) 400 MG tablet Take 1 tablet (400 mg total) by mouth once daily. 30 tablet 11 Unknown at Unknown time    aspirin 81 MG Chew Take 1 tablet (81 mg total) by mouth once daily. 90 tablet 3 Unknown at Unknown time    blood  sugar diagnostic (ONETOUCH ULTRA BLUE TEST STRIP) Strp Use to test blood glucose twice daily (Patient taking differently: Tests 3 times daily) 100 each 11     blood-glucose meter Misc Use to test blood glucose 1 each 0     lancets 30 gauge Misc use to test blood glucose 2 (two) times daily with meals. 100 each 11        Review of patient's allergies indicates:   Allergen Reactions    Ciprofloxacin      Itchiness    Contrast media      Kidney injury    Pcn [penicillins]      Rash; tolerated ceftriaxone on 1/13/20       Past Medical History:   Diagnosis Date    Anxiety     Depression     Diabetes mellitus     type 2    GERD (gastroesophageal reflux disease)     Hyperlipemia     Hypertension     Myocardial infarction 2010    minor-caused by stress per pt.     Past Surgical History:   Procedure Laterality Date    Angiogram - Right Extremity Right 7/9/15    angiogram-left leg  10/6/15    CATHETERIZATION OF BOTH LEFT AND RIGHT HEART N/A 12/18/2019    Procedure: CATHETERIZATION, HEART, BOTH LEFT AND RIGHT;  Surgeon: Que Fernando III, MD;  Location: [facility] CATH LAB;  Service: Cardiology;  Laterality: N/A;    CORONARY ANGIOGRAPHY N/A 12/18/2019    Procedure: ANGIOGRAM, CORONARY ARTERY;  Surgeon: Que Fernando III, MD;  Location: [facility] CATH LAB;  Service: Cardiology;  Laterality: N/A;    CORONARY ARTERY BYPASS GRAFT (CABG) N/A 1/14/2020    Procedure: CORONARY ARTERY BYPASS GRAFT (CABG) x 1     Off Pump;  Surgeon: Huang Altamirano MD;  Location: [facility] OR [address] [place] [address];  Service: Cardiovascular;  Laterality: N/A;    INSERTION OF TUNNELED CENTRAL VENOUS HEMODIALYSIS CATHETER N/A 1/27/2020    Procedure: Insertion, Catheter, Central Venous, Hemodialysis;  Surgeon: ESTEBAN Gomez III, MD;  Location: [facility] CATH LAB;  Service: Peripheral Vascular;  Laterality: N/A;     Family History     None        Tobacco Use    Smoking status: Never Smoker    Smokeless tobacco: Never Used   Substance and Sexual Activity     Alcohol use: No    Drug use: Yes     Types: Marijuana     Comment: used yesterday    Sexual activity: Not on file     Review of Systems   Constitutional: Negative for chills and fever.   HENT: Negative for congestion.    Eyes: Negative for visual disturbance.   Respiratory: Negative for shortness of breath.    Cardiovascular: Negative for chest pain.   Gastrointestinal: Negative for abdominal distention.   Endocrine: Negative for cold intolerance.   Genitourinary: Negative for flank pain.   Musculoskeletal: Negative for back pain.   Skin: Positive for color change and wound. Negative for pallor and rash.   Allergic/Immunologic: Negative for immunocompromised state.   Neurological: Negative for dizziness.   Hematological: Does not bruise/bleed easily.   Psychiatric/Behavioral: Negative for agitation.     Objective:     Vital Signs (Most Recent):  Temp: 98.1 °F (36.7 °C) (07/20/20 1047)  Pulse: 100 (07/20/20 1047)  Resp: 18 (07/20/20 1047)  BP: (!) 129/57 (07/20/20 1047)  SpO2: 97 % (07/20/20 1047) Vital Signs (24h Range):  Temp:  [98 °F (36.7 °C)-98.2 °F (36.8 °C)] 98.1 °F (36.7 °C)  Pulse:  [] 100  Resp:  [16-19] 18  SpO2:  [95 %-100 %] 97 %  BP: (107-145)/(57-68) 129/57     Weight: 87.5 kg (192 lb 14.4 oz)  Body mass index is 32.1 kg/m².    Physical Exam  Vitals signs reviewed.   Constitutional:       General: She is not in acute distress.     Appearance: She is well-developed. She is not diaphoretic.   HENT:      Head: Normocephalic and atraumatic.   Eyes:      Conjunctiva/sclera: Conjunctivae normal.   Neck:      Musculoskeletal: Neck supple.   Cardiovascular:      Rate and Rhythm: Normal rate.      Pulses:           Femoral pulses are 2+ on the right side and 2+ on the left side.       Popliteal pulses are 2+ on the right side and 2+ on the left side.        Dorsalis pedis pulses are detected w/ Doppler on the right side and detected w/ Doppler on the left side.        Posterior tibial pulses are  detected w/ Doppler on the right side and detected w/ Doppler on the left side.   Pulmonary:      Effort: Pulmonary effort is normal.   Abdominal:      General: There is no distension.      Palpations: Abdomen is soft. There is no mass.      Tenderness: There is no abdominal tenderness. There is no guarding or rebound.      Hernia: No hernia is present.   Musculoskeletal: Normal range of motion.         General: No deformity.   Feet:      Left foot:      Skin integrity: Ulcer and skin breakdown present.   Skin:     General: Skin is warm and dry.      Findings: No rash.   Neurological:      General: No focal deficit present.      Mental Status: She is alert and oriented to person, place, and time.   Psychiatric:         Mood and Affect: Mood normal.         Significant Labs:  All pertinent labs from the last 24 hours have been reviewed.    Significant Diagnostics:  I have reviewed all pertinent imaging results/findings within the past 24 hours.

## 2020-07-20 NOTE — ASSESSMENT & PLAN NOTE
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 Cultures now with Enterococcus, GNR, and GBS.  · Discontinue aztreonam.   · OK to continue Cipro for now.   · Continue vancomycin.   · Patient likely to require IV antibiotics. Please discuss PICC vs subclavian catheter for IV antibiotics with nephrology.   · If GNR is sensitive for cephalosporins then will change Cipro.

## 2020-07-20 NOTE — PLAN OF CARE
Pt will require 6 weeks IV abx for osteomyelitis; pt has [facility] plan; Infusion referral sent to [facility] via [facility]; pending ID final recommendations       07/20/20 1105   Post-Acute Status   Post-Acute Authorization Medications   Medication Status Rx fund Referral  ([facility])   Discharge Delays None known at this time   Discharge Plan   Discharge Plan A Home Health

## 2020-07-20 NOTE — NURSING
Patient going to scheduled procedure as ordered Cardiology for Ultransound. Patient is AAOx4 without any discomfort.

## 2020-07-20 NOTE — SUBJECTIVE & OBJECTIVE
Interval History: No new complaints.    Review of Systems   HENT: Negative for ear discharge and ear pain.    Eyes: Negative for discharge and itching.   Endocrine: Negative for cold intolerance and heat intolerance.   Neurological: Negative for seizures and syncope.     Objective:     Vital Signs (Most Recent):  Temp: 98 °F (36.7 °C) (07/20/20 1608)  Pulse: 97 (07/20/20 1608)  Resp: 18 (07/20/20 1608)  BP: 128/68 (07/20/20 1608)  SpO2: 98 % (07/20/20 1608) Vital Signs (24h Range):  Temp:  [98 °F (36.7 °C)-98.2 °F (36.8 °C)] 98 °F (36.7 °C)  Pulse:  [] 97  Resp:  [16-19] 18  SpO2:  [95 %-100 %] 98 %  BP: (107-145)/(57-68) 128/68     Weight: 87.5 kg (192 lb 14.4 oz)  Body mass index is 32.1 kg/m².    Intake/Output Summary (Last 24 hours) at 7/20/2020 1618  Last data filed at 7/20/2020 1053  Gross per 24 hour   Intake 640 ml   Output 1 ml   Net 639 ml      Physical Exam  Vitals signs and nursing note reviewed.   Constitutional:       General: She is not in acute distress.     Appearance: She is well-developed. She is not diaphoretic.   HENT:      Head: Normocephalic and atraumatic.      Right Ear: Hearing and external ear normal.      Left Ear: Hearing and external ear normal.      Nose: Nose normal.      Mouth/Throat:      Mouth: Mucous membranes are moist.      Pharynx: Oropharynx is clear. Uvula midline. No oropharyngeal exudate.   Eyes:      General: No scleral icterus.        Right eye: No discharge.         Left eye: No discharge.   Neck:      Musculoskeletal: Normal range of motion and neck supple.      Thyroid: No thyromegaly.      Trachea: No tracheal deviation.   Cardiovascular:      Rate and Rhythm: Normal rate and regular rhythm.      Heart sounds: Normal heart sounds. No murmur. No friction rub. No gallop.    Pulmonary:      Effort: Pulmonary effort is normal. No respiratory distress.      Breath sounds: Normal breath sounds. No stridor. No wheezing or rales.   Chest:      Chest wall: No tenderness.    Abdominal:      General: Bowel sounds are normal. There is no distension.      Palpations: Abdomen is soft.      Tenderness: There is no abdominal tenderness. There is no guarding or rebound.   Musculoskeletal: Normal range of motion.         General: No tenderness.      Comments: Left foot wrapped with gauze dressing.    Lymphadenopathy:      Cervical: No cervical adenopathy.   Skin:     General: Skin is warm and dry.      Coloration: Skin is not pale.      Findings: No erythema or rash.   Neurological:      Mental Status: She is alert and oriented to person, place, and time.      Cranial Nerves: No cranial nerve deficit.      Coordination: Coordination normal.   Psychiatric:         Behavior: Behavior normal.         Thought Content: Thought content normal.         Judgment: Judgment normal.         Significant Labs:   BMP:   Recent Labs   Lab 07/20/20  0405   *   *   K 4.4      CO2 20*   BUN 47*   CREATININE 2.4*   CALCIUM 9.1     CBC:   Recent Labs   Lab 07/19/20  0526   WBC 7.75   HGB 11.0*   HCT 34.1*          Significant Imaging: I have reviewed all pertinent imaging results/findings within the past 24 hours.

## 2020-07-21 LAB
ALBUMIN SERPL BCP-MCNC: 2.3 G/DL (ref 3.5–5.2)
ANION GAP SERPL CALC-SCNC: 11 MMOL/L (ref 8–16)
ANION GAP SERPL CALC-SCNC: 9 MMOL/L (ref 8–16)
BACTERIA SPEC AEROBE CULT: ABNORMAL
BUN SERPL-MCNC: 47 MG/DL (ref 6–20)
BUN SERPL-MCNC: 47 MG/DL (ref 6–20)
CALCIUM SERPL-MCNC: 9 MG/DL (ref 8.7–10.5)
CALCIUM SERPL-MCNC: 9.1 MG/DL (ref 8.7–10.5)
CHLORIDE SERPL-SCNC: 106 MMOL/L (ref 95–110)
CHLORIDE SERPL-SCNC: 107 MMOL/L (ref 95–110)
CK SERPL-CCNC: 78 U/L (ref 20–180)
CO2 SERPL-SCNC: 16 MMOL/L (ref 23–29)
CO2 SERPL-SCNC: 16 MMOL/L (ref 23–29)
CREAT SERPL-MCNC: 1.9 MG/DL (ref 0.5–1.4)
CREAT SERPL-MCNC: 1.9 MG/DL (ref 0.5–1.4)
EST. GFR  (AFRICAN AMERICAN): 34 ML/MIN/1.73 M^2
EST. GFR  (AFRICAN AMERICAN): 34 ML/MIN/1.73 M^2
EST. GFR  (NON AFRICAN AMERICAN): 30 ML/MIN/1.73 M^2
EST. GFR  (NON AFRICAN AMERICAN): 30 ML/MIN/1.73 M^2
FINAL PATHOLOGIC DIAGNOSIS: NORMAL
GLUCOSE SERPL-MCNC: 310 MG/DL (ref 70–110)
GLUCOSE SERPL-MCNC: 333 MG/DL (ref 70–110)
GROSS: NORMAL
PHOSPHATE SERPL-MCNC: 4 MG/DL (ref 2.7–4.5)
POCT GLUCOSE: 135 MG/DL (ref 70–110)
POCT GLUCOSE: 157 MG/DL (ref 70–110)
POCT GLUCOSE: 283 MG/DL (ref 70–110)
POCT GLUCOSE: 349 MG/DL (ref 70–110)
POCT GLUCOSE: 368 MG/DL (ref 70–110)
POCT GLUCOSE: 443 MG/DL (ref 70–110)
POCT GLUCOSE: 485 MG/DL (ref 70–110)
POTASSIUM SERPL-SCNC: 5.4 MMOL/L (ref 3.5–5.1)
POTASSIUM SERPL-SCNC: 5.7 MMOL/L (ref 3.5–5.1)
POTASSIUM SERPL-SCNC: 5.8 MMOL/L (ref 3.5–5.1)
SODIUM SERPL-SCNC: 132 MMOL/L (ref 136–145)
SODIUM SERPL-SCNC: 133 MMOL/L (ref 136–145)
URATE SERPL-MCNC: 8.6 MG/DL (ref 2.4–5.7)
VANCOMYCIN SERPL-MCNC: 16.5 UG/ML

## 2020-07-21 PROCEDURE — 84550 ASSAY OF BLOOD/URIC ACID: CPT

## 2020-07-21 PROCEDURE — 63600175 PHARM REV CODE 636 W HCPCS: Performed by: HOSPITALIST

## 2020-07-21 PROCEDURE — 99232 PR SUBSEQUENT HOSPITAL CARE,LEVL II: ICD-10-PCS | Mod: ,,, | Performed by: INTERNAL MEDICINE

## 2020-07-21 PROCEDURE — 21400001 HC TELEMETRY ROOM

## 2020-07-21 PROCEDURE — 99232 PR SUBSEQUENT HOSPITAL CARE,LEVL II: ICD-10-PCS | Mod: ,,, | Performed by: PODIATRIST

## 2020-07-21 PROCEDURE — 82550 ASSAY OF CK (CPK): CPT

## 2020-07-21 PROCEDURE — 25000003 PHARM REV CODE 250: Performed by: HOSPITALIST

## 2020-07-21 PROCEDURE — 25000242 PHARM REV CODE 250 ALT 637 W/ HCPCS: Performed by: HOSPITALIST

## 2020-07-21 PROCEDURE — 94761 N-INVAS EAR/PLS OXIMETRY MLT: CPT

## 2020-07-21 PROCEDURE — 94640 AIRWAY INHALATION TREATMENT: CPT

## 2020-07-21 PROCEDURE — S0073 INJECTION, AZTREONAM, 500 MG: HCPCS | Performed by: HOSPITALIST

## 2020-07-21 PROCEDURE — 99232 SBSQ HOSP IP/OBS MODERATE 35: CPT | Mod: ,,, | Performed by: INTERNAL MEDICINE

## 2020-07-21 PROCEDURE — 84132 ASSAY OF SERUM POTASSIUM: CPT

## 2020-07-21 PROCEDURE — 80202 ASSAY OF VANCOMYCIN: CPT

## 2020-07-21 PROCEDURE — 80069 RENAL FUNCTION PANEL: CPT

## 2020-07-21 PROCEDURE — 25000003 PHARM REV CODE 250: Performed by: PHYSICIAN ASSISTANT

## 2020-07-21 PROCEDURE — 99232 SBSQ HOSP IP/OBS MODERATE 35: CPT | Mod: ,,, | Performed by: PODIATRIST

## 2020-07-21 PROCEDURE — 80048 BASIC METABOLIC PNL TOTAL CA: CPT

## 2020-07-21 PROCEDURE — C9113 INJ PANTOPRAZOLE SODIUM, VIA: HCPCS | Performed by: HOSPITALIST

## 2020-07-21 PROCEDURE — 36415 COLL VENOUS BLD VENIPUNCTURE: CPT

## 2020-07-21 RX ORDER — SODIUM CITRATE AND CITRIC ACID MONOHYDRATE 334; 500 MG/5ML; MG/5ML
30 SOLUTION ORAL 2 TIMES DAILY
Status: DISCONTINUED | OUTPATIENT
Start: 2020-07-21 | End: 2020-07-22

## 2020-07-21 RX ORDER — ALBUTEROL SULFATE 2.5 MG/.5ML
10 SOLUTION RESPIRATORY (INHALATION) ONCE
Status: COMPLETED | OUTPATIENT
Start: 2020-07-21 | End: 2020-07-21

## 2020-07-21 RX ORDER — GABAPENTIN 100 MG/1
100 CAPSULE ORAL 3 TIMES DAILY
Status: DISCONTINUED | OUTPATIENT
Start: 2020-07-21 | End: 2020-07-30 | Stop reason: HOSPADM

## 2020-07-21 RX ORDER — ALLOPURINOL 100 MG/1
100 TABLET ORAL DAILY
Status: DISCONTINUED | OUTPATIENT
Start: 2020-07-22 | End: 2020-07-21

## 2020-07-21 RX ADMIN — INSULIN DETEMIR 15 UNITS: 100 INJECTION, SOLUTION SUBCUTANEOUS at 08:07

## 2020-07-21 RX ADMIN — AZTREONAM 1000 MG: 1 INJECTION, POWDER, LYOPHILIZED, FOR SOLUTION INTRAMUSCULAR; INTRAVENOUS at 01:07

## 2020-07-21 RX ADMIN — ATORVASTATIN CALCIUM 80 MG: 40 TABLET, FILM COATED ORAL at 08:07

## 2020-07-21 RX ADMIN — DIPHENHYDRAMINE HYDROCHLORIDE 25 MG: 25 CAPSULE ORAL at 10:07

## 2020-07-21 RX ADMIN — HEPARIN SODIUM 5000 UNITS: 5000 INJECTION INTRAVENOUS; SUBCUTANEOUS at 09:07

## 2020-07-21 RX ADMIN — SODIUM POLYSTYRENE SULFONATE 30 G: 15 SUSPENSION ORAL; RECTAL at 10:07

## 2020-07-21 RX ADMIN — SODIUM CHLORIDE: 0.9 INJECTION, SOLUTION INTRAVENOUS at 01:07

## 2020-07-21 RX ADMIN — INSULIN ASPART 3 UNITS: 100 INJECTION, SOLUTION INTRAVENOUS; SUBCUTANEOUS at 09:07

## 2020-07-21 RX ADMIN — CARVEDILOL 6.25 MG: 6.25 TABLET, FILM COATED ORAL at 08:07

## 2020-07-21 RX ADMIN — PANTOPRAZOLE SODIUM 40 MG: 40 INJECTION, POWDER, FOR SOLUTION INTRAVENOUS at 09:07

## 2020-07-21 RX ADMIN — SODIUM CITRATE AND CITRIC ACID MONOHYDRATE 30 ML: 500; 334 SOLUTION ORAL at 12:07

## 2020-07-21 RX ADMIN — MORPHINE SULFATE 2 MG: 10 INJECTION INTRAVENOUS at 09:07

## 2020-07-21 RX ADMIN — CLOPIDOGREL BISULFATE 75 MG: 75 TABLET, FILM COATED ORAL at 09:07

## 2020-07-21 RX ADMIN — SODIUM CITRATE AND CITRIC ACID MONOHYDRATE 30 ML: 500; 334 SOLUTION ORAL at 10:07

## 2020-07-21 RX ADMIN — ACETAMINOPHEN 650 MG: 325 TABLET ORAL at 09:07

## 2020-07-21 RX ADMIN — HEPARIN SODIUM 5000 UNITS: 5000 INJECTION INTRAVENOUS; SUBCUTANEOUS at 06:07

## 2020-07-21 RX ADMIN — PREDNISONE 50 MG: 20 TABLET ORAL at 06:07

## 2020-07-21 RX ADMIN — VANCOMYCIN HYDROCHLORIDE 750 MG: 750 INJECTION, POWDER, LYOPHILIZED, FOR SOLUTION INTRAVENOUS at 11:07

## 2020-07-21 RX ADMIN — GABAPENTIN 100 MG: 100 CAPSULE ORAL at 04:07

## 2020-07-21 RX ADMIN — ASPIRIN 81 MG 81 MG: 81 TABLET ORAL at 08:07

## 2020-07-21 RX ADMIN — AZTREONAM 1000 MG: 1 INJECTION, POWDER, LYOPHILIZED, FOR SOLUTION INTRAMUSCULAR; INTRAVENOUS at 06:07

## 2020-07-21 RX ADMIN — GABAPENTIN 100 MG: 100 CAPSULE ORAL at 09:07

## 2020-07-21 RX ADMIN — GABAPENTIN 300 MG: 300 CAPSULE ORAL at 08:07

## 2020-07-21 RX ADMIN — AMIODARONE HYDROCHLORIDE 400 MG: 200 TABLET ORAL at 08:07

## 2020-07-21 RX ADMIN — AZTREONAM 1000 MG: 1 INJECTION, POWDER, LYOPHILIZED, FOR SOLUTION INTRAMUSCULAR; INTRAVENOUS at 09:07

## 2020-07-21 RX ADMIN — ALBUTEROL SULFATE 10 MG: 2.5 SOLUTION RESPIRATORY (INHALATION) at 09:07

## 2020-07-21 RX ADMIN — PREDNISONE 50 MG: 20 TABLET ORAL at 10:07

## 2020-07-21 RX ADMIN — INSULIN ASPART 3 UNITS: 100 INJECTION, SOLUTION INTRAVENOUS; SUBCUTANEOUS at 08:07

## 2020-07-21 RX ADMIN — CALCIUM GLUCONATE 1000 MG: 94 INJECTION, SOLUTION INTRAVENOUS at 09:07

## 2020-07-21 RX ADMIN — HEPARIN SODIUM 5000 UNITS: 5000 INJECTION INTRAVENOUS; SUBCUTANEOUS at 02:07

## 2020-07-21 RX ADMIN — MORPHINE SULFATE 2 MG: 10 INJECTION INTRAVENOUS at 03:07

## 2020-07-21 RX ADMIN — INSULIN ASPART 4 UNITS: 100 INJECTION, SOLUTION INTRAVENOUS; SUBCUTANEOUS at 12:07

## 2020-07-21 NOTE — SUBJECTIVE & OBJECTIVE
Interval History: About the same.    Review of Systems   HENT: Negative for ear discharge and ear pain.    Eyes: Negative for discharge and itching.   Endocrine: Negative for cold intolerance and heat intolerance.   Neurological: Negative for seizures and syncope.     Objective:     Vital Signs (Most Recent):  Temp: 97.8 °F (36.6 °C) (07/21/20 1146)  Pulse: 81 (07/21/20 1146)  Resp: 16 (07/21/20 1146)  BP: 122/60 (07/21/20 1146)  SpO2: 95 % (07/21/20 1146) Vital Signs (24h Range):  Temp:  [97.8 °F (36.6 °C)-98.2 °F (36.8 °C)] 97.8 °F (36.6 °C)  Pulse:  [] 81  Resp:  [16-20] 16  SpO2:  [89 %-98 %] 95 %  BP: (107-154)/(60-77) 122/60     Weight: 87.5 kg (192 lb 14.4 oz)  Body mass index is 32.1 kg/m².    Intake/Output Summary (Last 24 hours) at 7/21/2020 1350  Last data filed at 7/21/2020 0118  Gross per 24 hour   Intake 1550 ml   Output --   Net 1550 ml      Physical Exam  Vitals signs and nursing note reviewed.   Constitutional:       General: She is not in acute distress.     Appearance: She is well-developed. She is not diaphoretic.   HENT:      Head: Normocephalic and atraumatic.      Right Ear: Hearing and external ear normal.      Left Ear: Hearing and external ear normal.      Nose: Nose normal.      Mouth/Throat:      Mouth: Mucous membranes are moist.      Pharynx: Oropharynx is clear. Uvula midline. No oropharyngeal exudate.   Eyes:      General: No scleral icterus.        Right eye: No discharge.         Left eye: No discharge.   Neck:      Musculoskeletal: Normal range of motion and neck supple.      Thyroid: No thyromegaly.      Trachea: No tracheal deviation.   Cardiovascular:      Rate and Rhythm: Normal rate and regular rhythm.      Heart sounds: Normal heart sounds. No murmur. No friction rub. No gallop.    Pulmonary:      Effort: Pulmonary effort is normal. No respiratory distress.      Breath sounds: Normal breath sounds. No stridor. No wheezing or rales.   Chest:      Chest wall: No  tenderness.   Abdominal:      General: Bowel sounds are normal. There is no distension.      Palpations: Abdomen is soft.      Tenderness: There is no abdominal tenderness. There is no guarding or rebound.   Musculoskeletal: Normal range of motion.         General: No tenderness.      Comments: Left foot wrapped with gauze dressing.    Lymphadenopathy:      Cervical: No cervical adenopathy.   Skin:     General: Skin is warm and dry.      Coloration: Skin is not pale.      Findings: No erythema or rash.   Neurological:      Mental Status: She is alert and oriented to person, place, and time.      Cranial Nerves: No cranial nerve deficit.      Coordination: Coordination normal.   Psychiatric:         Behavior: Behavior normal.         Thought Content: Thought content normal.         Judgment: Judgment normal.         Significant Labs:   BMP:   Recent Labs   Lab 07/21/20  0925 07/21/20  1151   *  --    *  --    K 5.7* 5.4*     --    CO2 16*  --    BUN 47*  --    CREATININE 1.9*  --    CALCIUM 9.0  --      CBC:   No results for input(s): WBC, HGB, HCT, PLT in the last 48 hours.    Significant Imaging: I have reviewed all pertinent imaging results/findings within the past 24 hours.

## 2020-07-21 NOTE — NURSING
Report given to oncoming nurse Earnestine BRIGHT, Patient is awake and alert on RA,no distress noted, aware of NPO status at midnight for angiogram. Bed is in lowest position, wheels locked, call light is in reach. 12 hour chart check completed

## 2020-07-21 NOTE — PLAN OF CARE
Consult received.  Full consult to follow.  Thank you for allowing me to participate in the care of your patient.  Please call with any questions.    Date of service: 7/21/2020  Luisana Payne MD   Nephrology  [facility]  Office [phone]

## 2020-07-21 NOTE — ASSESSMENT & PLAN NOTE
Appreciate Nephrology input.  Creat improving since admission, but with Hyperkalemia (not POA) today.  Will treat with Calcium and Albuterol.  Repeat K later.

## 2020-07-21 NOTE — NURSING
[facility]  [place] Proactive Rounding Note    Date of Visit: 07/21/2020  Time of Visit: 0550    Admit Date: 7/17/2020  LOS: 4    Code Status: Full Code     Attending Physician: Silvestre Espana MD    TRIGGER:    Reason for Round:  O2 Device/ Pulse Ox    ASSESSMENT:     Abnormal Vital Signs: MEWS 1, 89% on RA, 18, 89, 128/63, 98.2  Clinical Issues: Respiratory     INTERVENTIONS/ RECOMMENDATIONS:     This RN made bedside rounds due to POX 89% on RA on flow sheet. Upon recheck at bedside the POX was 95%, HR 66, respirations even and unlabored.     Discussed plan of care with RNEarnestine.    PHYSICIAN ESCALATION:     Yes/No  no        Disposition: Remain in room 309.    FOLLOW-UP/CONTINGENCY:     Call back the Rapid Response Nurse at [phone] for additional questions or concerns.

## 2020-07-21 NOTE — NURSING
Report Recieved from off going nurse Earnestine BRIGHT. Patient is AAO, on Room air, Legs Elevated, Aware of NPO diet, and Angiogram procedure planned for 12p today. Bed in lowest position, wheels locked, call light in reach.

## 2020-07-21 NOTE — SUBJECTIVE & OBJECTIVE
"Interval History: "I'm going for surgery today". 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 Wound stable. Undergoing re-vascularization today.     Review of Systems   Constitutional: Negative for chills, fatigue, fever and unexpected weight change.   HENT: Negative for ear pain, facial swelling, hearing loss, mouth sores, nosebleeds, rhinorrhea, sinus pressure, sore throat, tinnitus, trouble swallowing and voice change.    Eyes: Negative for photophobia, pain, redness and visual disturbance.   Respiratory: Negative for cough, chest tightness, shortness of breath and wheezing.    Cardiovascular: Negative for chest pain, palpitations and leg swelling.   Gastrointestinal: Negative for abdominal pain, blood in stool, constipation, diarrhea, nausea and vomiting.   Endocrine: Negative for cold intolerance, heat intolerance, polydipsia, polyphagia and polyuria.   Genitourinary: Negative for decreased urine volume, dysuria, flank pain, frequency, hematuria, menstrual problem, urgency, vaginal bleeding, vaginal discharge and vaginal pain.   Musculoskeletal: Negative for arthralgias, back pain, joint swelling, myalgias and neck pain.   Skin: Positive for wound. Negative for rash.   Allergic/Immunologic: Negative for environmental allergies, food allergies and immunocompromised state.   Neurological: Negative for dizziness, seizures, syncope, weakness, light-headedness, numbness and headaches.   Hematological: Negative for adenopathy. Does not bruise/bleed easily.   Psychiatric/Behavioral: Negative for confusion, hallucinations, self-injury, sleep disturbance and suicidal ideas. The patient is not nervous/anxious.      Objective:     Vital Signs (Most Recent):  Temp: 97.8 °F (36.6 °C) (07/21/20 1146)  Pulse: 81 (07/21/20 1146)  Resp: 16 (07/21/20 1146)  BP: 122/60 (07/21/20 1146)  SpO2: 95 % (07/21/20 1146) Vital Signs (24h Range):  Temp:  [97.8 °F (36.6 °C)-98.2 °F (36.8 °C)] 97.8 °F (36.6 " °C)  Pulse:  [] 81  Resp:  [16-20] 16  SpO2:  [89 %-98 %] 95 %  BP: (107-154)/(60-77) 122/60     Weight: 87.5 kg (192 lb 14.4 oz)  Body mass index is 32.1 kg/m².    Estimated Creatinine Clearance: 37.4 mL/min (A) (based on SCr of 1.9 mg/dL (H)).    Physical Exam  Vitals signs and nursing note reviewed.   Constitutional:       General: She is not in acute distress.     Appearance: She is well-developed. She is not diaphoretic.   HENT:      Head: Normocephalic and atraumatic.      Right Ear: Hearing and external ear normal.      Left Ear: Hearing and external ear normal.      Nose: Nose normal.      Mouth/Throat:      Mouth: Mucous membranes are moist.      Pharynx: Oropharynx is clear. Uvula midline. No oropharyngeal exudate.   Eyes:      General: No scleral icterus.        Right eye: No discharge.         Left eye: No discharge.   Neck:      Musculoskeletal: Normal range of motion and neck supple.      Thyroid: No thyromegaly.      Trachea: No tracheal deviation.   Cardiovascular:      Rate and Rhythm: Normal rate and regular rhythm.      Heart sounds: Normal heart sounds. No murmur. No friction rub. No gallop.    Pulmonary:      Effort: Pulmonary effort is normal. No respiratory distress.      Breath sounds: Normal breath sounds. No stridor. No wheezing or rales.   Chest:      Chest wall: No tenderness.   Abdominal:      General: Bowel sounds are normal. There is no distension.      Palpations: Abdomen is soft.      Tenderness: There is no abdominal tenderness. There is no guarding or rebound.   Musculoskeletal: Normal range of motion.         General: No tenderness.      Comments: Left foot wrapped with gauze dressing.    Lymphadenopathy:      Cervical: No cervical adenopathy.   Skin:     General: Skin is warm and dry.      Coloration: Skin is not pale.      Findings: No erythema or rash.   Neurological:      Mental Status: She is alert and oriented to person, place, and time.      Cranial Nerves: No cranial  nerve deficit.      Coordination: Coordination normal.   Psychiatric:         Behavior: Behavior normal.         Thought Content: Thought content normal.         Judgment: Judgment normal.         Significant Labs:   Blood Culture:   Recent Labs   Lab 07/17/20  1631 07/17/20  1712   LABBLOO No Growth to date  No Growth to date  No Growth to date  No Growth to date No Growth to date  No Growth to date  No Growth to date  No Growth to date     BMP:   Recent Labs   Lab 07/21/20  0925   *   *   K 5.7*      CO2 16*   BUN 47*   CREATININE 1.9*   CALCIUM 9.0     CBC: No results for input(s): WBC, HGB, HCT, PLT in the last 48 hours.  Wound Culture:   Recent Labs   Lab 07/18/20  0929   LABAERO ENTEROCOCCUS FAECALIS  Moderate  *  STREPTOCOCCUS AGALACTIAE (GROUP B)  Few  Beta-hemolytic streptococci are routinely susceptible to   penicillins,cephalosporins and carbapenems.  *  ENTEROBACTER CLOACAE  Rare  *       Significant Imaging: I have reviewed all pertinent imaging results/findings within the past 24 hours.

## 2020-07-21 NOTE — PROGRESS NOTES
[facility] Hospital Medicine  Progress Note    Patient Name: Suyapa Connelly  MRN: [id number]  Patient Class: IP- Inpatient   Admission Date: 7/17/2020  Length of Stay: 4 days  Attending Physician: Silvestre Espana MD  Primary Care Provider: Erlinda Rahman NP        Subjective:     Principal Problem:Acute osteomyelitis of left calcaneus        HPI:  Suyapa Connelly is a 54 y.o. female that (in part)  has a past medical history of Anxiety, Depression, Diabetes mellitus, GERD (gastroesophageal reflux disease), Hyperlipemia, Hypertension, and Myocardial infarction.  has a past surgical history that includes Angiogram - Right Extremity (Right, 7/9/15); angiogram-left leg (10/6/15); Catheterization of both left and right heart (N/A, 12/18/2019); Coronary angiography (N/A, 12/18/2019); Coronary Artery Bypass Graft (CABG) (N/A, 1/14/2020); and Insertion of tunneled central venous hemodialysis catheter (N/A, 1/27/2020). Presents to [facility] Emergency Department complaining of the left heel foot wound.  This is been there for several weeks with progressive worsening.  Poor healing.  Patient became concerned due to malodorous drainage.  Pain with ambulation and weight-bearing.  Associated hyperglycemia.  Denies fever chills.    In the emergency department physical exam concerning for osteomyelitis secondary to diabetic foot ulcer in the setting of uncontrolled diabetes.  Routine laboratory studies revealed hyperglycemia.  X-ray of the foot performed.  Concerning for osteomyelitis.  MRI confirmed this.  Podiatry was consulted.  Broad-spectrum antibiotics were initiated in the ED a special consideration given to risk of Pseudomonas infection.    Hospital medicine has been asked to admit to inpatient for further evaluation and treatment.     Overview/Hospital Course:  Suyapa Connelly is a 54 y.o. female that (in part)  has a past medical history of Anxiety, Depression, Diabetes  mellitus, GERD (gastroesophageal reflux disease), Hyperlipemia, Hypertension, and Myocardial infarction.  has a past surgical history that includes Angiogram - Right Extremity (Right, 7/9/15); angiogram-left leg (10/6/15); Catheterization of both left and right heart (N/A, 12/18/2019); Coronary angiography (N/A, 12/18/2019); Coronary Artery Bypass Graft (CABG) (N/A, 1/14/2020); and Insertion of tunneled central venous hemodialysis catheter (N/A, 1/27/2020). Presents to [facility] Emergency Department complaining of the left heel foot wound.  MRI confirmed osteo. Podiatry, ID, and vascular were consulted.  Podiatry discussed options with patient. She has chosen 6 weeks of IV abx. ID has been consulted to tailor abx. Blood cultures were NG. Patient started on multiple Abx. Bone bx on 7/18. Vascular Surgery consulted and planning angiogram.  Patient with renal failure and Nephrology consulted.    Interval History: About the same.    Review of Systems   HENT: Negative for ear discharge and ear pain.    Eyes: Negative for discharge and itching.   Endocrine: Negative for cold intolerance and heat intolerance.   Neurological: Negative for seizures and syncope.     Objective:     Vital Signs (Most Recent):  Temp: 97.8 °F (36.6 °C) (07/21/20 1146)  Pulse: 81 (07/21/20 1146)  Resp: 16 (07/21/20 1146)  BP: 122/60 (07/21/20 1146)  SpO2: 95 % (07/21/20 1146) Vital Signs (24h Range):  Temp:  [97.8 °F (36.6 °C)-98.2 °F (36.8 °C)] 97.8 °F (36.6 °C)  Pulse:  [] 81  Resp:  [16-20] 16  SpO2:  [89 %-98 %] 95 %  BP: (107-154)/(60-77) 122/60     Weight: 87.5 kg (192 lb 14.4 oz)  Body mass index is 32.1 kg/m².    Intake/Output Summary (Last 24 hours) at 7/21/2020 1350  Last data filed at 7/21/2020 0118  Gross per 24 hour   Intake 1550 ml   Output --   Net 1550 ml      Physical Exam  Vitals signs and nursing note reviewed.   Constitutional:       General: She is not in acute distress.     Appearance: She is  well-developed. She is not diaphoretic.   HENT:      Head: Normocephalic and atraumatic.      Right Ear: Hearing and external ear normal.      Left Ear: Hearing and external ear normal.      Nose: Nose normal.      Mouth/Throat:      Mouth: Mucous membranes are moist.      Pharynx: Oropharynx is clear. Uvula midline. No oropharyngeal exudate.   Eyes:      General: No scleral icterus.        Right eye: No discharge.         Left eye: No discharge.   Neck:      Musculoskeletal: Normal range of motion and neck supple.      Thyroid: No thyromegaly.      Trachea: No tracheal deviation.   Cardiovascular:      Rate and Rhythm: Normal rate and regular rhythm.      Heart sounds: Normal heart sounds. No murmur. No friction rub. No gallop.    Pulmonary:      Effort: Pulmonary effort is normal. No respiratory distress.      Breath sounds: Normal breath sounds. No stridor. No wheezing or rales.   Chest:      Chest wall: No tenderness.   Abdominal:      General: Bowel sounds are normal. There is no distension.      Palpations: Abdomen is soft.      Tenderness: There is no abdominal tenderness. There is no guarding or rebound.   Musculoskeletal: Normal range of motion.         General: No tenderness.      Comments: Left foot wrapped with gauze dressing.    Lymphadenopathy:      Cervical: No cervical adenopathy.   Skin:     General: Skin is warm and dry.      Coloration: Skin is not pale.      Findings: No erythema or rash.   Neurological:      Mental Status: She is alert and oriented to person, place, and time.      Cranial Nerves: No cranial nerve deficit.      Coordination: Coordination normal.   Psychiatric:         Behavior: Behavior normal.         Thought Content: Thought content normal.         Judgment: Judgment normal.         Significant Labs:   BMP:   Recent Labs   Lab 07/21/20  0925 07/21/20  1151   *  --    *  --    K 5.7* 5.4*     --    CO2 16*  --    BUN 47*  --    CREATININE 1.9*  --    CALCIUM  9.0  --      CBC:   No results for input(s): WBC, HGB, HCT, PLT in the last 48 hours.    Significant Imaging: I have reviewed all pertinent imaging results/findings within the past 24 hours.      Assessment/Plan:      * Acute osteomyelitis of left calcaneus  MRI confirms osteo. Bone Bx- 7/18  Appreciate consultants input.  Patient refusing amputation and wants to try IV ABx's. On Aztreonam.  ID consulted for ABx recommendations.  Vascular Surgery consulted and planning angiogram.  CKD and Nephrology consulted.  Vascular discussed case with Nephrology and will hold off on Angio until renal function optimized.        Infection due to enterococcus        Stage 3 chronic kidney disease  Appreciate Nephrology input.  Creat improving since admission, but with Hyperkalemia (not POA) today.  Will treat with Calcium and Albuterol.  Repeat K later.      Non healing left heel wound  As above.      S/P CABG x 1  No acute issues       Mixed hyperlipidemia  Stable      Chronic combined systolic and diastolic heart failure  No acute issues   EF 50% on recent echo   Gently hydrate prior to angiogram.      Essential hypertension  Continue current management.      Type 2 diabetes mellitus without complication, with long-term current use of insulin  Uncontrolled with hyperglycemia. Manifestations likely of CKD 3    Will check an A1c- > 14.  Glucose much higher today, but she did receive doses of steroids as pre medication for possible angio.  Continue current management and adjust if remains hyperglycemic off steroids.        Peripheral artery disease  Angiogram as above.        VTE Risk Mitigation (From admission, onward)         Ordered     heparin (porcine) injection 5,000 Units  Every 8 hours      07/17/20 2217     IP VTE HIGH RISK PATIENT  Once      07/17/20 2217     Place sequential compression device  Until discontinued      07/17/20 2217     Place HAILEY hose  Until discontinued      07/17/20 2217                      Silvestre BEEBE  MD Jovi  Department of Hospital Medicine   [facility]

## 2020-07-21 NOTE — ASSESSMENT & PLAN NOTE
MRI confirms osteo. Bone Bx- 7/18  Appreciate consultants input.  Patient refusing amputation and wants to try IV ABx's. On Aztreonam.  ID consulted for ABx recommendations.  Vascular Surgery consulted and planning angiogram.  CKD and Nephrology consulted.  Vascular discussed case with Nephrology and will hold off on Angio until renal function optimized.

## 2020-07-21 NOTE — CONSULTS
[facility]  Nephrology  Consult Note    Patient Name: Suyapa Connelly  MRN: [id number]  Admission Date: 7/17/2020  Hospital Length of Stay: 4 days  Attending Provider: Silvestre Espana MD   Primary Care Physician: Erlinda Rahman NP  Principal Problem:Acute osteomyelitis of left calcaneus    Inpatient consult to Nephrology  Consult performed by: VENANCIO Pineda  Consult ordered by: Silvestre Espana MD        Subjective:     HPI: Ms. Connelly is a 54 y.o AAF with hx uncontrolled DM, GERD, depression, HTN, CAD, NSTEMI s/p CABG 1/2020 associated with HD dependant ROSLYN (LEVI) who presented to [facility] with L foot wound, bone biopsy grew out E faecalis, E Cloacae and GBS, started on IV Abx, Podiatry on board. Vascular also consulted and is planning for angiogram for limb salvage. Nephrology consulted for CKD. Chart reveiwed and medical records obtained. Pt underwent CABG 1/14/20, SLED initiated 1/16 for what was felt to be contrast induced nephropathy. She remained on dialysis for some time afterwards but was able to come off ~ 3/10/20. Prior to CABG baseline Cr appears .9-1.2 GFR 52->60; however since she was able to come off of HD around march, baseline Cr unclear but based on available values, appears to be 1.4 -1.9 with GFR 30-43. On arrival to [facility] 7/17, Cr 2.5 and BUN 38. Cr has continued to gradually trend down and is 1.9 today. Since 5 pm yesterday, pt has been receiving pre-angio  ml NS/ hr as angiogram originally planned for today. Pt reports she has not had a hx of kidney problems prior to January of 2020. She reports good UOP at home, was previously on torsemide but discontinued taking the medicine about 2 months ago due to cramping and resolution of peripheral edema. Discussed importance of keeping blood sugar under control (Hgb > 14 this admit) in relation to kidneys and wound healing. Pt reports she stays away from rice/ pasta at home but does drink regular coke sometimes.   at bedside offering support. Pt currently denies any complaints including SOB, CP, palpitations, n/v, diarrhea, constipation. She is hoping that she can salvage her L foot    Past Medical History:   Diagnosis Date    Anxiety     Depression     Diabetes mellitus     type 2    GERD (gastroesophageal reflux disease)     Hyperlipemia     Hypertension     Myocardial infarction 2010    minor-caused by stress per pt.       Past Surgical History:   Procedure Laterality Date    Angiogram - Right Extremity Right 7/9/15    angiogram-left leg  10/6/15    CATHETERIZATION OF BOTH LEFT AND RIGHT HEART N/A 12/18/2019    Procedure: CATHETERIZATION, HEART, BOTH LEFT AND RIGHT;  Surgeon: Que Fernando III, MD;  Location: [facility] CATH LAB;  Service: Cardiology;  Laterality: N/A;    CORONARY ANGIOGRAPHY N/A 12/18/2019    Procedure: ANGIOGRAM, CORONARY ARTERY;  Surgeon: Que Fernando III, MD;  Location: [facility] CATH LAB;  Service: Cardiology;  Laterality: N/A;    CORONARY ARTERY BYPASS GRAFT (CABG) N/A 1/14/2020    Procedure: CORONARY ARTERY BYPASS GRAFT (CABG) x 1     Off Pump;  Surgeon: Huang Altamirano MD;  Location: [facility] OR [address] [place] [address];  Service: Cardiovascular;  Laterality: N/A;    INSERTION OF TUNNELED CENTRAL VENOUS HEMODIALYSIS CATHETER N/A 1/27/2020    Procedure: Insertion, Catheter, Central Venous, Hemodialysis;  Surgeon: ESTEBAN Gomez III, MD;  Location: [facility] CATH LAB;  Service: Peripheral Vascular;  Laterality: N/A;       Review of patient's allergies indicates:   Allergen Reactions    Ciprofloxacin      Itchiness    Contrast media      Kidney injury    Pcn [penicillins]      Rash; tolerated ceftriaxone on 1/13/20     Current Facility-Administered Medications   Medication Frequency    0.9%  NaCl infusion Continuous    acetaminophen tablet 650 mg Q8H PRN    acetaminophen tablet 650 mg Q8H PRN    amiodarone tablet 400 mg Daily    aspirin chewable tablet 81 mg Daily    atorvastatin tablet 80 mg  Daily    aztreonam 1 g in dextrose 5 % 50 mL IVPB (ready to mix system) Q8H    carvediloL tablet 6.25 mg Daily    ciprofloxacin (CIPRO)400mg/200ml D5W IVPB 400 mg Q18H    clopidogreL tablet 75 mg Daily    dextrose 50% injection 12.5 g PRN    diphenhydrAMINE capsule 25 mg Q6H PRN    gabapentin capsule 300 mg TID    glucagon (human recombinant) injection 1 mg PRN    heparin (porcine) injection 5,000 Units Q8H    insulin aspart U-100 pen 0-5 Units Q6H PRN    insulin detemir U-100 pen 15 Units Daily    melatonin tablet 6 mg Nightly PRN    morphine injection 2 mg Q4H PRN    ondansetron injection 8 mg Q8H PRN    pantoprazole injection 40 mg Daily    sodium chloride 0.9% flush 10 mL PRN    sodium citrate-citric acid 500-334 mg/5 ml solution 30 mL BID    vancomycin - pharmacy to dose pharmacy to manage frequency     Family History     None        Tobacco Use    Smoking status: Never Smoker    Smokeless tobacco: Never Used   Substance and Sexual Activity    Alcohol use: No    Drug use: Yes     Types: Marijuana     Comment: used yesterday    Sexual activity: Not on file     Review of Systems   Constitutional: Negative for activity change.   Respiratory: Negative for cough and shortness of breath.    Cardiovascular: Negative for chest pain, palpitations and leg swelling.   Gastrointestinal: Negative for abdominal pain, diarrhea and nausea.   Genitourinary: Negative for decreased urine volume, dysuria and hematuria.   Neurological: Negative for dizziness and headaches.     Objective:     Vital Signs (Most Recent):  Temp: 97.8 °F (36.6 °C) (07/21/20 1146)  Pulse: 81 (07/21/20 1146)  Resp: 16 (07/21/20 1146)  BP: 122/60 (07/21/20 1146)  SpO2: 95 % (07/21/20 1146)  O2 Device (Oxygen Therapy): room air (07/21/20 0904) Vital Signs (24h Range):  Temp:  [97.8 °F (36.6 °C)-98.2 °F (36.8 °C)] 97.8 °F (36.6 °C)  Pulse:  [] 81  Resp:  [16-20] 16  SpO2:  [89 %-98 %] 95 %  BP: (107-154)/(60-77) 122/60      Weight: 87.5 kg (192 lb 14.4 oz) (07/20/20 0418)  Body mass index is 32.1 kg/m².  Body surface area is 2 meters squared.    I/O last 3 completed shifts:  In: 1820 [P.O.:220; I.V.:1500; IV Piggyback:100]  Out: 1 [Urine:1]    Physical Exam    Significant Labs:  CBC:   Recent Labs   Lab 07/19/20  0526   WBC 7.75   RBC 3.65*   HGB 11.0*   HCT 34.1*      MCV 93   MCH 30.1   MCHC 32.3     CMP:   Recent Labs   Lab 07/17/20  1631  07/21/20  0925 07/21/20  1151   *   < > 333*  --    CALCIUM 9.5   < > 9.0  --    ALBUMIN 3.0*  --  2.3*  --    PROT 8.5*  --   --   --    *   < > 133*  --    K 4.3   < > 5.7* 5.4*   CO2 18*   < > 16*  --       < > 106  --    BUN 38*   < > 47*  --    CREATININE 2.5*   < > 1.9*  --    ALKPHOS 115  --   --   --    ALT <5*  --   --   --    AST 11  --   --   --    BILITOT 0.5  --   --   --     < > = values in this interval not displayed.     Recent Labs   Lab 07/17/20  1815   COLORU Yellow   SPECGRAV 1.020   PHUR 5.0   PROTEINUA 3+*   BACTERIA Many*   NITRITE Negative   LEUKOCYTESUR Trace*   UROBILINOGEN Negative   HYALINECASTS 1     All labs within the past 24 hours have been reviewed.    Significant Imaging:  Labs: Reviewed    Assessment/Plan:   ROSLYN on CKD 3  - Has hx of HD dependant ROSLYN (LEVI) requiring RRT from 1/16/20 -3/10/20  - Recent baseline Cr unclear but based on available values, appears to be 1.4 -1.9 with GFR 30-43; though consideration for possible progression of CKD; suspect underlying diabetic nephropathy Hgb A 1c > 14  - OA 7/17, Cr 2.5 and BUN 38. Cr has continued to gradually trend down   - Etiology unclear at this time; possibly d/t hyperglycemia? Glucose 483 on admit. Renal US 7/17 unremarkable, UA c/w ROSLYN  - Cr 1.9 today with BUN 47 --> associated w/ hyperkalemia    - Repeat UA, UPrCr, Akosua, CPK, Uric acid  - planning to delay angiogram until renal function stabilizes  - caution with IVF in advanced CKD patients - monitor O2 closely/ need for  diuresis  - renally dose meds for current GFR/ avoid nephrotoxic meds- adjusted gabapentin  - strict Is/ Os/ daily weights    Hyperkalemia  - K 5.8 this AM; repeated and K 5.7   - medically managing with kayexalate, albuterol, bicitra  - K trending down on repeat labs   - renal diet  - f/u AM labs    Metabolic Acidosis   - likely d/t ROSLYN + CKD; exacerbated by NS  - ABG c/w primary met acidosis   - Bicitra 30 ml PO BID, titrate PRN  - AM labs    CKD MBD  - CCa and phos nwl  - PTH and Vit D in AM     Hyperuricemia   - uric acid 8.6  - start allopurinol 50 mg PO QD  - will trend    VENANCIO Pineda  Nephrology  [facility]

## 2020-07-21 NOTE — PHYSICIAN QUERY
PT Name: Suyapa Connelly  MR #: [id number]    Non-Pressure Ulcer Clarification      CDS: Swetha Jacob RN, CCDS  Contact information: [email or web address]    This form is a permanent document in the medical record.     Query Date: July 21, 2020    By submitting this query, we are merely seeking further clarification of documentation. Please utilize your independent clinical judgment when addressing the question(s) below.    The medical record contains the following:   Indicator   Supporting Clinical Findings Location in Medical Record   x Non-pressure Ulcer Documented Wound 1: Left heel   Measurement: 2pto9eqo2.2cm  pre debridement 5.3cmx5.3cmx0.5cm post debridement.   Base: fibrous and necrotic eschar base   Periwound skin: HPK   Drainage: Purulent with malodor   Erythema: mild   Probe: probe to bone.        Left calcaneus osteomyelitis secondary to nonhealing left diabetic foot ulcer Podiatry consult 7/18                                          H&P 7/18    Wound Care Consult     x Radiology Findings FINDINGS:  Osseous edema with cortical indistinctness and T1 hypointensity are seen involving the posterior aspect of the calcaneus consistent with osteomyelitis, noting overlying ulceration seen posteriorly in this region. MRI 7/17    Acute/Chronic Illness      Treatment:      Other:        Provider, Please specify the depth of the non-pressure ulcer:    [  x ] Bone involvement without evidence of necrosis   [   ] Bone necrosis   [   ] Other depth (please specify): ____________   [   ] Other Integumentary Diagnosis (please specify): ___________   [  ] Clinically Undetermined       Please document in your progress notes daily for the duration of treatment, until resolved, and include in your discharge summary.

## 2020-07-21 NOTE — ASSESSMENT & PLAN NOTE
Uncontrolled with hyperglycemia. Manifestations likely of CKD 3    Will check an A1c- > 14.  Glucose much higher today, but she did receive doses of steroids as pre medication for possible angio.  Continue current management and adjust if remains hyperglycemic off steroids.

## 2020-07-21 NOTE — PROGRESS NOTES
[facility]  Infectious Disease  Progress Note    Patient Name: Suyapa Connelly  MRN: [id number]  Admission Date: 7/17/2020  Length of Stay: 4 days  Attending Physician: Silvestre Espana MD  Primary Care Provider: Erlinda Rahman NP    Isolation Status: No active isolations  Assessment/Plan:      * Acute osteomyelitis of left calcaneus  Osteomyelitis of the calcaneous presumed to be acute however pending pathology for confirmation. Cultures now with Enterococcus, E cloacae, and GBS. Afebrile and without leukocytosis.   · Continue aztreonam for now.   · Patient has tolerated cephalosporins in past. Recommend switching aztreonam to cefepime 2 gm IV every 12 hours after her surgical intervention today.   · Continue vancomycin.   · Patient likely to require IV antibiotics. Please discuss PICC vs subclavian catheter for IV antibiotics with nephrology.   · Will need at least 6 weeks of cefepime plus vancomycin from today. End date: 8/31/2020.  · Will need weekly CBC, CMP, ESR, CRP.     Infection due to enterococcus  See above      Gram-negative infection  See above        Anticipated Disposition: home with home health    Thank you for your consult. I will follow-up with patient. Please contact us if you have any additional questions.    Verenice Myles MD  Infectious Disease  [facility]    Subjective:     Principal Problem:Acute osteomyelitis of left calcaneus    HPI: A 54-year-old woman with CAD, HF-EF 35%, PAD, s/p bilateral lower extremity stents, DM 2,  Anxiety, depression, and chronic open wound of the left heel who developed malodorous drainage from her wound. This was associated with pain and hyperglycemia. She was evaluated on  ED where she was afebrile and without leukocytosis. Further work up revealed elevated creatinine level, an ESR of 139, and CRP of 9.2. MRI showed evidence of osteomyelitis of the calcaneous. She was evaluated by Podiatry and subsequently  "underwent bone biopsy for pathology and culture. Cultures with Enterococcus pending identification and sensitivities.     Mrs. Connelly has allergy to penicillin, Bactrim, and clindamycin. She has a pet dog. She smoke marijuana occasionally. She enjoys fishing.     Infectious Diseases consulted for management recommendations.     Interval History: "I'm going for surgery today". 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 Wound stable. Undergoing re-vascularization today.     Review of Systems   Constitutional: Negative for chills, fatigue, fever and unexpected weight change.   HENT: Negative for ear pain, facial swelling, hearing loss, mouth sores, nosebleeds, rhinorrhea, sinus pressure, sore throat, tinnitus, trouble swallowing and voice change.    Eyes: Negative for photophobia, pain, redness and visual disturbance.   Respiratory: Negative for cough, chest tightness, shortness of breath and wheezing.    Cardiovascular: Negative for chest pain, palpitations and leg swelling.   Gastrointestinal: Negative for abdominal pain, blood in stool, constipation, diarrhea, nausea and vomiting.   Endocrine: Negative for cold intolerance, heat intolerance, polydipsia, polyphagia and polyuria.   Genitourinary: Negative for decreased urine volume, dysuria, flank pain, frequency, hematuria, menstrual problem, urgency, vaginal bleeding, vaginal discharge and vaginal pain.   Musculoskeletal: Negative for arthralgias, back pain, joint swelling, myalgias and neck pain.   Skin: Positive for wound. Negative for rash.   Allergic/Immunologic: Negative for environmental allergies, food allergies and immunocompromised state.   Neurological: Negative for dizziness, seizures, syncope, weakness, light-headedness, numbness and headaches.   Hematological: Negative for adenopathy. Does not bruise/bleed easily.   Psychiatric/Behavioral: Negative for confusion, hallucinations, self-injury, sleep disturbance and " suicidal ideas. The patient is not nervous/anxious.      Objective:     Vital Signs (Most Recent):  Temp: 97.8 °F (36.6 °C) (07/21/20 1146)  Pulse: 81 (07/21/20 1146)  Resp: 16 (07/21/20 1146)  BP: 122/60 (07/21/20 1146)  SpO2: 95 % (07/21/20 1146) Vital Signs (24h Range):  Temp:  [97.8 °F (36.6 °C)-98.2 °F (36.8 °C)] 97.8 °F (36.6 °C)  Pulse:  [] 81  Resp:  [16-20] 16  SpO2:  [89 %-98 %] 95 %  BP: (107-154)/(60-77) 122/60     Weight: 87.5 kg (192 lb 14.4 oz)  Body mass index is 32.1 kg/m².    Estimated Creatinine Clearance: 37.4 mL/min (A) (based on SCr of 1.9 mg/dL (H)).    Physical Exam  Vitals signs and nursing note reviewed.   Constitutional:       General: She is not in acute distress.     Appearance: She is well-developed. She is not diaphoretic.   HENT:      Head: Normocephalic and atraumatic.      Right Ear: Hearing and external ear normal.      Left Ear: Hearing and external ear normal.      Nose: Nose normal.      Mouth/Throat:      Mouth: Mucous membranes are moist.      Pharynx: Oropharynx is clear. Uvula midline. No oropharyngeal exudate.   Eyes:      General: No scleral icterus.        Right eye: No discharge.         Left eye: No discharge.   Neck:      Musculoskeletal: Normal range of motion and neck supple.      Thyroid: No thyromegaly.      Trachea: No tracheal deviation.   Cardiovascular:      Rate and Rhythm: Normal rate and regular rhythm.      Heart sounds: Normal heart sounds. No murmur. No friction rub. No gallop.    Pulmonary:      Effort: Pulmonary effort is normal. No respiratory distress.      Breath sounds: Normal breath sounds. No stridor. No wheezing or rales.   Chest:      Chest wall: No tenderness.   Abdominal:      General: Bowel sounds are normal. There is no distension.      Palpations: Abdomen is soft.      Tenderness: There is no abdominal tenderness. There is no guarding or rebound.   Musculoskeletal: Normal range of motion.         General: No tenderness.      Comments:  Left foot wrapped with gauze dressing.    Lymphadenopathy:      Cervical: No cervical adenopathy.   Skin:     General: Skin is warm and dry.      Coloration: Skin is not pale.      Findings: No erythema or rash.   Neurological:      Mental Status: She is alert and oriented to person, place, and time.      Cranial Nerves: No cranial nerve deficit.      Coordination: Coordination normal.   Psychiatric:         Behavior: Behavior normal.         Thought Content: Thought content normal.         Judgment: Judgment normal.         Significant Labs:   Blood Culture:   Recent Labs   Lab 07/17/20  1631 07/17/20  1712   LABBLOO No Growth to date  No Growth to date  No Growth to date  No Growth to date No Growth to date  No Growth to date  No Growth to date  No Growth to date     BMP:   Recent Labs   Lab 07/21/20  0925   *   *   K 5.7*      CO2 16*   BUN 47*   CREATININE 1.9*   CALCIUM 9.0     CBC: No results for input(s): WBC, HGB, HCT, PLT in the last 48 hours.  Wound Culture:   Recent Labs   Lab 07/18/20  0929   LABAERO ENTEROCOCCUS FAECALIS  Moderate  *  STREPTOCOCCUS AGALACTIAE (GROUP B)  Few  Beta-hemolytic streptococci are routinely susceptible to   penicillins,cephalosporins and carbapenems.  *  ENTEROBACTER CLOACAE  Rare  *       Significant Imaging: I have reviewed all pertinent imaging results/findings within the past 24 hours.

## 2020-07-21 NOTE — PROGRESS NOTES
[facility]  Podiatry  Progress Note    Patient Name: Suyapa Connelly  MRN: [id number]  Admission Date: 7/17/2020  Hospital Length of Stay: 4 days  Attending Physician: Silvestre Espana MD  Primary Care Provider: Erlinda Rahman NP     Subjective:     History of Present Illness: 53 y/o male PMH DM2, PAD, CABG admitted for left heel infection. Reports wound started as a blister in Jan. 2020 after she was in a coma for 11 days following CABG procedure. Reports she was then discharged to rehab. Reports applying neosporin to left heel ulceration however the wound has progressively worsened in the past week. Reports she did not seek medical attention for the left heel wound prior due to COVID concerns as she was taking care of her elderly parents. Reports nausea today however reports this has been present for several weeks was taking zofran for this. Denies fevers, chills.      07/21/2020 patient seen at bedside.  She is resting comfortably.  She relates pain well controlled with current narcotic regimen.  She relates she is disappointed with finding that she has decreased blood flow to the foot.  No new pedal complaints.   She denies nausea, vomiting, fever, chills    Scheduled Meds:   amiodarone  400 mg Oral Daily    aspirin  81 mg Oral Daily    atorvastatin  80 mg Oral Daily    aztreonam  1,000 mg Intravenous Q8H    carvediloL  6.25 mg Oral Daily    ciprofloxacin  400 mg Intravenous Q18H    clopidogreL  75 mg Oral Daily    gabapentin  300 mg Oral TID    heparin (porcine)  5,000 Units Subcutaneous Q8H    insulin detemir U-100  15 Units Subcutaneous Daily    pantoprazole  40 mg Intravenous Daily    sodium citrate-citric acid 500-334 mg/5 ml  30 mL Oral BID     Continuous Infusions:   sodium chloride 0.9% 125 mL/hr at 07/21/20 0118     PRN Meds:acetaminophen, acetaminophen, dextrose 50%, diphenhydrAMINE, glucagon (human recombinant), insulin aspart U-100, melatonin, morphine, ondansetron,  sodium chloride 0.9%, Pharmacy to dose Vancomycin consult **AND** vancomycin - pharmacy to dose    Review of patient's allergies indicates:   Allergen Reactions    Ciprofloxacin      Itchiness    Contrast media      Kidney injury    Pcn [penicillins]      Rash; tolerated ceftriaxone on 1/13/20        Past Medical History:   Diagnosis Date    Anxiety     Depression     Diabetes mellitus     type 2    GERD (gastroesophageal reflux disease)     Hyperlipemia     Hypertension     Myocardial infarction 2010    minor-caused by stress per pt.     Past Surgical History:   Procedure Laterality Date    Angiogram - Right Extremity Right 7/9/15    angiogram-left leg  10/6/15    CATHETERIZATION OF BOTH LEFT AND RIGHT HEART N/A 12/18/2019    Procedure: CATHETERIZATION, HEART, BOTH LEFT AND RIGHT;  Surgeon: Que Fernando III, MD;  Location: [facility] CATH LAB;  Service: Cardiology;  Laterality: N/A;    CORONARY ANGIOGRAPHY N/A 12/18/2019    Procedure: ANGIOGRAM, CORONARY ARTERY;  Surgeon: Que Fernando III, MD;  Location: [facility] CATH LAB;  Service: Cardiology;  Laterality: N/A;    CORONARY ARTERY BYPASS GRAFT (CABG) N/A 1/14/2020    Procedure: CORONARY ARTERY BYPASS GRAFT (CABG) x 1     Off Pump;  Surgeon: Huang Altamirano MD;  Location: [facility] OR [address] [place] [address];  Service: Cardiovascular;  Laterality: N/A;    INSERTION OF TUNNELED CENTRAL VENOUS HEMODIALYSIS CATHETER N/A 1/27/2020    Procedure: Insertion, Catheter, Central Venous, Hemodialysis;  Surgeon: ESTEBAN Gomez III, MD;  Location: [facility] CATH LAB;  Service: Peripheral Vascular;  Laterality: N/A;       Family History     None        Tobacco Use    Smoking status: Never Smoker    Smokeless tobacco: Never Used   Substance and Sexual Activity    Alcohol use: No    Drug use: Yes     Types: Marijuana     Comment: used yesterday    Sexual activity: Not on file     Review of Systems   Constitutional: Negative.    Respiratory: Negative.    Cardiovascular: Negative.     Gastrointestinal: Negative.    Genitourinary: Negative.    Musculoskeletal: Positive for arthralgias.   Skin: Positive for wound.   Neurological: Negative.    Psychiatric/Behavioral: Negative.      Objective:     Vital Signs (Most Recent):  Temp: 97.8 °F (36.6 °C) (07/21/20 1146)  Pulse: 81 (07/21/20 1146)  Resp: 16 (07/21/20 1146)  BP: 122/60 (07/21/20 1146)  SpO2: 95 % (07/21/20 1146) Vital Signs (24h Range):  Temp:  [97.8 °F (36.6 °C)-98.2 °F (36.8 °C)] 97.8 °F (36.6 °C)  Pulse:  [] 81  Resp:  [16-20] 16  SpO2:  [89 %-98 %] 95 %  BP: (107-154)/(60-77) 122/60     Weight: 87.5 kg (192 lb 14.4 oz)  Body mass index is 32.1 kg/m².    Foot Exam    General  Orientation: alert and oriented to person, place, and time       Right Foot/Ankle     Inspection and Palpation  Swelling: none     Neurovascular  Dorsalis pedis: 1+  Posterior tibial: 1+  Saphenous nerve sensation: diminished  Tibial nerve sensation: diminished  Superficial peroneal nerve sensation: diminished  Deep peroneal nerve sensation: diminished  Sural nerve sensation: diminished      Left Foot/Ankle      Inspection and Palpation  Tenderness: calcaneus tenderness   Skin Exam: ulcer;     Neurovascular  Dorsalis pedis: 1+  Posterior tibial: 1+  Saphenous nerve sensation: diminished  Tibial nerve sensation: diminished  Superficial peroneal nerve sensation: diminished  Deep peroneal nerve sensation: diminished  Sural nerve sensation: diminished          7/21/2020    Wound 1: Left heel   Measurement:  5.3cmx5.3cmx0.5cm   Base: fibrous and necrotic eschar base  Periwound skin: HPK  Drainage: serous  Erythema: mild  Probe: probe to bone.             7/18/20:      Pre debridement         Post debridement         Laboratory:  CBC:   Recent Labs   Lab 07/19/20  0526   WBC 7.75   RBC 3.65*   HGB 11.0*   HCT 34.1*      MCV 93   MCH 30.1   MCHC 32.3     CMP:   Recent Labs   Lab 07/17/20  1631  07/21/20  0925   *   < > 333*   CALCIUM 9.5   < > 9.0    ALBUMIN 3.0*  --  2.3*   PROT 8.5*  --   --    *   < > 133*   K 4.3   < > 5.7*   CO2 18*   < > 16*      < > 106   BUN 38*   < > 47*   CREATININE 2.5*   < > 1.9*   ALKPHOS 115  --   --    ALT <5*  --   --    AST 11  --   --    BILITOT 0.5  --   --     < > = values in this interval not displayed.       Microbiology Results (last 7 days)     Procedure Component Value Units Date/Time    Aerobic culture [[id number]]  (Abnormal)  (Susceptibility) Collected: 07/18/20 0929    Order Status: Completed Specimen: Bone from Foot, Left Updated: 07/21/20 0745     Aerobic Bacterial Culture ENTEROCOCCUS FAECALIS  Moderate        STREPTOCOCCUS AGALACTIAE (GROUP B)  Few  Beta-hemolytic streptococci are routinely susceptible to   penicillins,cephalosporins and carbapenems.        ENTEROBACTER CLOACAE  Rare      Blood Culture #2 **CANNOT BE ORDERED STAT** [[id number]] Collected: 07/17/20 1712    Order Status: Completed Specimen: Blood from Peripheral, Hand, Left Updated: 07/20/20 1903     Blood Culture, Routine No Growth to date      No Growth to date      No Growth to date      No Growth to date    Blood Culture #1 **CANNOT BE ORDERED STAT** [[id number]] Collected: 07/17/20 1631    Order Status: Completed Specimen: Blood from Peripheral, Antecubital, Right Updated: 07/20/20 1903     Blood Culture, Routine No Growth to date      No Growth to date      No Growth to date      No Growth to date    Gram stain [[id number]] Collected: 07/18/20 0929    Order Status: Completed Specimen: Bone from Foot, Left Updated: 07/19/20 0346     Gram Stain Result No WBC's      No organisms seen          Diagnostic Results:  X-Ray Foot Complete Left  Order: [id number]  Status:  Final result   Visible to patient:  No (not released) Next appt:  None Dx:  Non healing left heel wound  Details    Reading Physician Reading Date Result Priority   Solomon Jauregui DO  [phone]  [phone] 7/17/2020 STAT      Narrative & Impression      EXAMINATION:  XR FOOT COMPLETE 3 VIEW LEFT     CLINICAL HISTORY:  .  Unspecified open wound, left foot, initial encounter     TECHNIQUE:  AP, lateral and oblique views of the left foot were performed.     COMPARISON:  None     FINDINGS:  No evidence for acute fracture line or dislocation of the left foot.  Prominent mixed density opacity overlies the 1st distal phalanx with radiopaque and soft tissue density overall indeterminate.  Clinical correlation advised.     There is a large lucency projected over the calcaneal soft tissues concerning for possible ulceration.  No definite bony erosion of the underlying calcaneus.  Clinical correlation and further evaluation with MRI as warranted if patient compatible.  There is prominent vascular calcifications within the soft tissues.     Impression:     Please see above              MRI Foot (Hindfoot) Left Without Contrast  Order: [id number]  Status:  Final result   Visible to patient:  No (not released) Next appt:  None  Details    Reading Physician Reading Date Result Priority   Ayesha Lindo MD  [phone] 7/17/2020 STAT      Narrative & Impression     EXAMINATION:  MRI FOOT (HINDFOOT) LEFT WITHOUT CONTRAST     CLINICAL HISTORY:  Osteomyelitis suspected, diabetic;calcaneus osteo;     TECHNIQUE:  Multiplanar, multisequence images were obtained of the left hind foot without the use of IV contrast.     COMPARISON:  Left foot radiographs from the same date.     FINDINGS:  Osseous edema with cortical indistinctness and T1 hypointensity are seen involving the posterior aspect of the calcaneus consistent with osteomyelitis, noting overlying ulceration seen posteriorly in this region.  Achilles tendon is intact.  Remaining visualized osseous structures show no significant osseous edema, fracture, or marrow replacement process.  No focal fluid collections seen.  There is posterior subcutaneous edema.  No significant ankle joint effusion.  Mild edema is also noted in  Kager's fat pad.     Impression:     Osteomyelitis involving the posterior aspect of the calcaneus.           US Lower Extremity Arteries Bilateral  Order: [id number]  Status:  Final result   Visible to patient:  No (not released) Next appt:  None Dx:  PAD (peripheral artery disease)  Details    Reading Physician Reading Date Result Priority   Ayesha Lindo MD  [phone] 7/17/2020 STAT      Narrative & Impression     EXAMINATION:  US LOWER EXTREMITY ARTERIES BILATERAL     CLINICAL HISTORY:  Peripheral vascular disease, unspecified     TECHNIQUE:  Bilateral spectral, color and grayscale images of the large arteries of both lower extremities were performed.     COMPARISON:  CT lower extremity runoff from 01/03/2020.     FINDINGS:  Right lower extremity:     Common femoral artery: 128 cm/sec, biphasic     SFA proximal: 210 cm/sec, triphasic     SFA mid: 105 cm/sec, triphasic     SFA distal: 86 cm/sec, biphasic     Popliteal artery graft: Patent with velocity measuring 479 cm/sec at the proximal anastomosis, with velocities ranging from 49-54 cm/sec within the proximal to distal portions of the graft with monophasic waveform seen throughout.     Posterior tibial artery: 24 cm/sec     Anterior tibial artery: 55 cm/sec     Left lower extremity:     Common femoral artery to popliteal graft: Patent with velocity measuring 184 cm/sec at the proximal anastomosis with triphasic waveforms.  Monophasic waveforms are seen distally within the graft with velocities measuring 67 cm/sec proximally, 25 cm/sec midportion, and 53 cm/sec distally.     SFA proximal: 15 cm/sec, monophasic     SFA mid: 67 cm/sec, monophasic     SFA distal: 175 cm/sec, monophasic     Popliteal artery: 28 cm/sec, monophasic     Posterior tibial artery: 17 cm/sec     Anterior tibial artery: 57 cm/sec     Impression:     1. Patent right lower extremity popliteal artery bypass graft.  Elevated velocities seen at the proximal anastomosis measuring 479  cm/sec consistent with hemodynamically significant stenosis.  2. Patent left lower extremity common femoral artery to popliteal artery bypass graft.  Doubling of velocities seen within the mid to distal portion of the graft suggestive for hemodynamically significant stenosis.  3. Doubling of velocities with hemodynamically significant stenosis between the left popliteal artery and anterior tibial artery.                 Left   Left arm  mmHg      Left posterior tibial 53 mmHg      Left dorsalis pedis 255 mmHg      Left ATTILA 2.13       Left toe pressure 0 mmHg      Left TBI 0             Clinical Findings:  Left heel ulceration with probe to bone.    Assessment/Plan:     Active Diagnoses:    Diagnosis Date Noted POA    PRINCIPAL PROBLEM:  Acute osteomyelitis of left calcaneus [M86.172] 07/18/2020 Yes    Infection due to enterococcus [A49.1] 07/20/2020 Yes    Gram-negative infection [A49.9] 07/20/2020 Yes    Stage 3 chronic kidney disease [N18.3] 07/18/2020 Yes    Non healing left heel wound [S91.302A] 07/17/2020 Yes    S/P CABG x 1 [Z95.1] 01/27/2020 Not Applicable    Mixed hyperlipidemia [E78.2] 12/13/2019 Yes    Chronic combined systolic and diastolic heart failure [I50.42] 12/08/2019 Yes    Type 2 diabetes mellitus without complication, with long-term current use of insulin [E11.9, Z79.4] 05/05/2018 Not Applicable    Essential hypertension [I10] 05/05/2018 Yes    Peripheral artery disease [I73.9]  Yes      Problems Resolved During this Admission:       Discussed two options with patient. L BKA vs IV abx for six weeks with aggressive wound care for several months with no guarantee of wound resolution.  Patient would like to try IV abx, wound care at this time.     ID on board to tailor abx.     Arterial studies show severe PAOD, vascular surgery on board with plans for intervention.    Pending vascular surgery reccs. Will plan for further debridement in OR this week.     Podiatry will follow.      Continue Nursing dressing changes as ordered, vashe BID    Thank you for your consult. I will follow-up with patient. Please contact us if you have any additional questions.    Magnolia Newton DPM  Podiatry  [facility]

## 2020-07-21 NOTE — ASSESSMENT & PLAN NOTE
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 Cultures now with Enterococcus, E cloacae, and GBS. Afebrile and without leukocytosis.   · Continue aztreonam for now.   · Patient has tolerated cephalosporins in past. Recommend switching aztreonam to cefepime 2 gm IV every 12 hours after her surgical intervention today.   · Continue vancomycin.   · Patient likely to require IV antibiotics. Please discuss PICC vs subclavian catheter for IV antibiotics with nephrology.   · Will need at least 6 weeks of cefepime plus vancomycin from today. End date: 8/31/2020.  · Will need weekly CBC, CMP, ESR, CRP.

## 2020-07-21 NOTE — PLAN OF CARE
Chart reviewed to address discharge needs and to update discharge plan; pt in with Osteomyelitis; pending angiogram and final recs from ID; pt will need 6 weeks IV abx/wound care; TN in to discuss discharge plan with patient; informed of need for long term IV abx; choices provided family at home vs SNF; pt stated that she has a daughter at home that can assist in giving IV abx and chose home option; referral previously sent to [facility] (d/t insurance); stated that patient will be covered at 100%; pending final recs from ID; pt will also need wound care; discussed home health vs outpt wound care; TN explained difficulty getting home yareli for Medicaid patients this late in the month; also discussed the benefits of outpt wound care where patient would be seen by a practitioner that is able to perform intervention if needed; pt opted for outpt wound care; pt stated that her transportation is limited now; TN discussed transportation options with patient and encouraged to contact Medicaid for assistance in arranging transportation to/fro wound care appointment; 0 changes in previously given information    Situation: Lives at home with family; daughter available to assist with care    Equipment: has walker; would like a cane    Appts: will need PCP, ambulatory referral to outpt wound care    Needs: PICC line    Dispo:home with IV abx ([facility]); outpt wound care       07/21/20 1316   Discharge Reassessment   Assessment Type Discharge Planning Reassessment   Provided patient/caregiver education on the expected discharge date and the discharge plan Yes   Do you have any problems affording any of your prescribed medications? No   Discharge Plan A Home Health   DME Needed Upon Discharge    (TBD)   Anticipated Discharge Disposition Home-Health   Can the patient/caregiver answer the patient profile reliably? Yes, cognitively intact   How does the patient rate their overall health at the present time? Good   Describe the patient's  ability to walk at the present time. Walks with the help of equipment   During the past month, has the patient often been bothered by feeling down, depressed or hopeless? No   During the past month, has the patient often been bothered by little interest or pleasure in doing things? No   Post-Acute Status   Post-Acute Authorization Medications   Medication Status Pending Access Placement   Discharge Delays None known at this time

## 2020-07-21 NOTE — PROGRESS NOTES
Pharmacokinetic Assessment Follow Up: IV Vancomycin    Vancomycin serum concentration assessment(s):    The random level was drawn correctly and can be used to guide therapy at this time. The measurement is within the desired definitive target range of 10 to 20 mcg/mL.    Vancomycin Regimen Plan:    Give 750 mg today.  Re-dose when the random level is less than 20 mcg/mL, next level to be drawn at 0400 on 7/22/2020    Drug levels (last 3 results):  Recent Labs   Lab Result Units 07/19/20  0526 07/20/20  0405 07/21/20  0610   Vancomycin, Random ug/mL 25.9 19.1 16.5       Pharmacy will continue to follow and monitor vancomycin.    Please contact pharmacy at extension [phone] for questions regarding this assessment.    Thank you for the consult,   Abdirashid Canela Jr       Patient brief summary:  Suyapa Connelly is a 54 y.o. female initiated on antimicrobial therapy with IV Vancomycin for treatment of bone/joint infection    Drug Allergies:   Review of patient's allergies indicates:   Allergen Reactions    Ciprofloxacin      Itchiness    Contrast media      Kidney injury    Pcn [penicillins]      Rash; tolerated ceftriaxone on 1/13/20       Actual Body Weight:   87.5 kg    Renal Function:   Estimated Creatinine Clearance: 29.6 mL/min (A) (based on SCr of 2.4 mg/dL (H)).,     Dialysis Method (if applicable):  N/A    CBC (last 72 hours):  Recent Labs   Lab Result Units 07/19/20  0526   WBC K/uL 7.75   Hemoglobin g/dL 11.0*   Hematocrit % 34.1*   Platelets K/uL 338   Gran% % 55.5   Lymph% % 31.9   Mono% % 9.8   Eosinophil% % 1.9   Basophil% % 0.8   Differential Method  Automated       Metabolic Panel (last 72 hours):  Recent Labs   Lab Result Units 07/19/20  0526 07/20/20  0405   Sodium mmol/L 132* 133*   Potassium mmol/L 4.2 4.4   Chloride mmol/L 105 104   CO2 mmol/L 18* 20*   Glucose mg/dL 114* 118*   BUN, Bld mg/dL 39* 47*   Creatinine mg/dL 2.2* 2.4*       Vancomycin Administrations:  vancomycin given in the last 96  hours                     vancomycin 500 mg in dextrose 5 % 100 mL IVPB (ready to mix system) (mg) 500 mg New Bag 07/20/20 1228    vancomycin in dextrose 5 % 1 gram/250 mL IVPB 1,000 mg (mg) 1,000 mg New Bag 07/18/20 1711    vancomycin 1.25 g in dextrose 5% 250 mL IVPB (ready to mix) ()  Restarted 07/18/20 0042     1,250 mg New Bag 07/17/20 2208                    Microbiologic Results:  Microbiology Results (last 7 days)       Procedure Component Value Units Date/Time    Aerobic culture [[id number]]  (Abnormal)  (Susceptibility) Collected: 07/18/20 0929    Order Status: Completed Specimen: Bone from Foot, Left Updated: 07/21/20 0745     Aerobic Bacterial Culture ENTEROCOCCUS FAECALIS  Moderate        STREPTOCOCCUS AGALACTIAE (GROUP B)  Few  Beta-hemolytic streptococci are routinely susceptible to   penicillins,cephalosporins and carbapenems.        ENTEROBACTER CLOACAE  Rare      Blood Culture #2 **CANNOT BE ORDERED STAT** [[id number]] Collected: 07/17/20 1712    Order Status: Completed Specimen: Blood from Peripheral, Hand, Left Updated: 07/20/20 1903     Blood Culture, Routine No Growth to date      No Growth to date      No Growth to date      No Growth to date    Blood Culture #1 **CANNOT BE ORDERED STAT** [[id number]] Collected: 07/17/20 1631    Order Status: Completed Specimen: Blood from Peripheral, Antecubital, Right Updated: 07/20/20 1903     Blood Culture, Routine No Growth to date      No Growth to date      No Growth to date      No Growth to date    Gram stain [[id number]] Collected: 07/18/20 0929    Order Status: Completed Specimen: Bone from Foot, Left Updated: 07/19/20 0346     Gram Stain Result No WBC's      No organisms seen

## 2020-07-21 NOTE — PLAN OF CARE
Problem: Adult Inpatient Plan of Care  Goal: Plan of Care Review  Outcome: Ongoing, Progressing     Problem: Adult Inpatient Plan of Care  Goal: Patient-Specific Goal (Individualization)  Outcome: Ongoing, Progressing       Problem: Infection  Goal: Infection Symptom Resolution  Outcome: Ongoing, Progressing     Problem: Renal Function Impairment (Acute Kidney Injury/Impairment)  Goal: Effective Renal Function  Outcome: Ongoing, Progressing

## 2020-07-22 PROBLEM — E87.1 HYPONATREMIA: Status: ACTIVE | Noted: 2020-07-22

## 2020-07-22 LAB
25(OH)D3+25(OH)D2 SERPL-MCNC: 7 NG/ML (ref 30–96)
ALBUMIN SERPL BCP-MCNC: 2.1 G/DL (ref 3.5–5.2)
ANION GAP SERPL CALC-SCNC: 10 MMOL/L (ref 8–16)
BACTERIA #/AREA URNS HPF: NORMAL /HPF
BACTERIA BLD CULT: NORMAL
BACTERIA BLD CULT: NORMAL
BASOPHILS # BLD AUTO: 0.02 K/UL (ref 0–0.2)
BASOPHILS NFR BLD: 0.1 % (ref 0–1.9)
BILIRUB UR QL STRIP: NEGATIVE
BUN SERPL-MCNC: 50 MG/DL (ref 6–20)
CALCIUM SERPL-MCNC: 8.1 MG/DL (ref 8.7–10.5)
CHLORIDE SERPL-SCNC: 101 MMOL/L (ref 95–110)
CLARITY UR: CLEAR
CO2 SERPL-SCNC: 19 MMOL/L (ref 23–29)
COLOR UR: ABNORMAL
CREAT SERPL-MCNC: 2 MG/DL (ref 0.5–1.4)
CREAT UR-MCNC: 56.7 MG/DL (ref 15–325)
CREAT UR-MCNC: 56.7 MG/DL (ref 15–325)
DIFFERENTIAL METHOD: ABNORMAL
EOSINOPHIL # BLD AUTO: 0 K/UL (ref 0–0.5)
EOSINOPHIL NFR BLD: 0 % (ref 0–8)
EOSINOPHIL URNS QL WRIGHT STN: NORMAL
ERYTHROCYTE [DISTWIDTH] IN BLOOD BY AUTOMATED COUNT: 14.4 % (ref 11.5–14.5)
EST. GFR  (AFRICAN AMERICAN): 32 ML/MIN/1.73 M^2
EST. GFR  (NON AFRICAN AMERICAN): 28 ML/MIN/1.73 M^2
GLUCOSE SERPL-MCNC: 408 MG/DL (ref 70–110)
GLUCOSE UR QL STRIP: ABNORMAL
HCT VFR BLD AUTO: 26.9 % (ref 37–48.5)
HGB BLD-MCNC: 8.8 G/DL (ref 12–16)
HGB UR QL STRIP: ABNORMAL
HYALINE CASTS #/AREA URNS LPF: 0 /LPF
IMM GRANULOCYTES # BLD AUTO: 0.09 K/UL (ref 0–0.04)
IMM GRANULOCYTES NFR BLD AUTO: 0.6 % (ref 0–0.5)
KETONES UR QL STRIP: NEGATIVE
LEUKOCYTE ESTERASE UR QL STRIP: NEGATIVE
LYMPHOCYTES # BLD AUTO: 1 K/UL (ref 1–4.8)
LYMPHOCYTES NFR BLD: 6 % (ref 18–48)
MCH RBC QN AUTO: 31 PG (ref 27–31)
MCHC RBC AUTO-ENTMCNC: 32.7 G/DL (ref 32–36)
MCV RBC AUTO: 95 FL (ref 82–98)
MICROSCOPIC COMMENT: NORMAL
MONOCYTES # BLD AUTO: 1.2 K/UL (ref 0.3–1)
MONOCYTES NFR BLD: 7.6 % (ref 4–15)
NEUTROPHILS # BLD AUTO: 13.5 K/UL (ref 1.8–7.7)
NEUTROPHILS NFR BLD: 85.7 % (ref 38–73)
NITRITE UR QL STRIP: NEGATIVE
NRBC BLD-RTO: 0 /100 WBC
PH UR STRIP: 5 [PH] (ref 5–8)
PHOSPHATE SERPL-MCNC: 3.2 MG/DL (ref 2.7–4.5)
PLATELET # BLD AUTO: 325 K/UL (ref 150–350)
PMV BLD AUTO: 11.3 FL (ref 9.2–12.9)
POCT GLUCOSE: 101 MG/DL (ref 70–110)
POCT GLUCOSE: 105 MG/DL (ref 70–110)
POCT GLUCOSE: 281 MG/DL (ref 70–110)
POCT GLUCOSE: 316 MG/DL (ref 70–110)
POTASSIUM SERPL-SCNC: 4.7 MMOL/L (ref 3.5–5.1)
PROT UR QL STRIP: ABNORMAL
PROT UR-MCNC: 144 MG/DL
PROT/CREAT UR: 2.54 MG/G{CREAT} (ref 0–0.2)
PTH-INTACT SERPL-MCNC: 77.8 PG/ML (ref 9–77)
RBC # BLD AUTO: 2.84 M/UL (ref 4–5.4)
RBC #/AREA URNS HPF: 2 /HPF (ref 0–4)
SODIUM SERPL-SCNC: 130 MMOL/L (ref 136–145)
SODIUM UR-SCNC: 21 MMOL/L (ref 20–250)
SP GR UR STRIP: 1.01 (ref 1–1.03)
URN SPEC COLLECT METH UR: ABNORMAL
UROBILINOGEN UR STRIP-ACNC: NEGATIVE EU/DL
UUN UR-MCNC: 457 MG/DL (ref 140–1050)
VANCOMYCIN SERPL-MCNC: 18.9 UG/ML
WBC # BLD AUTO: 15.74 K/UL (ref 3.9–12.7)
WBC #/AREA URNS HPF: 1 /HPF (ref 0–5)
YEAST URNS QL MICRO: NORMAL

## 2020-07-22 PROCEDURE — 80069 RENAL FUNCTION PANEL: CPT

## 2020-07-22 PROCEDURE — 25000003 PHARM REV CODE 250: Performed by: INTERNAL MEDICINE

## 2020-07-22 PROCEDURE — 63600175 PHARM REV CODE 636 W HCPCS: Performed by: HOSPITALIST

## 2020-07-22 PROCEDURE — 99232 PR SUBSEQUENT HOSPITAL CARE,LEVL II: ICD-10-PCS | Mod: ,,, | Performed by: INTERNAL MEDICINE

## 2020-07-22 PROCEDURE — 83970 ASSAY OF PARATHORMONE: CPT

## 2020-07-22 PROCEDURE — 25000003 PHARM REV CODE 250: Performed by: HOSPITALIST

## 2020-07-22 PROCEDURE — 21400001 HC TELEMETRY ROOM

## 2020-07-22 PROCEDURE — 36415 COLL VENOUS BLD VENIPUNCTURE: CPT

## 2020-07-22 PROCEDURE — S0073 INJECTION, AZTREONAM, 500 MG: HCPCS | Performed by: HOSPITALIST

## 2020-07-22 PROCEDURE — 84540 ASSAY OF URINE/UREA-N: CPT

## 2020-07-22 PROCEDURE — 87205 SMEAR GRAM STAIN: CPT

## 2020-07-22 PROCEDURE — 81000 URINALYSIS NONAUTO W/SCOPE: CPT

## 2020-07-22 PROCEDURE — 85025 COMPLETE CBC W/AUTO DIFF WBC: CPT

## 2020-07-22 PROCEDURE — 84300 ASSAY OF URINE SODIUM: CPT

## 2020-07-22 PROCEDURE — 25000003 PHARM REV CODE 250: Performed by: PHYSICIAN ASSISTANT

## 2020-07-22 PROCEDURE — 82306 VITAMIN D 25 HYDROXY: CPT

## 2020-07-22 PROCEDURE — 84156 ASSAY OF PROTEIN URINE: CPT

## 2020-07-22 PROCEDURE — 99232 SBSQ HOSP IP/OBS MODERATE 35: CPT | Mod: ,,, | Performed by: INTERNAL MEDICINE

## 2020-07-22 PROCEDURE — 80202 ASSAY OF VANCOMYCIN: CPT

## 2020-07-22 RX ORDER — SODIUM CITRATE AND CITRIC ACID MONOHYDRATE 334; 500 MG/5ML; MG/5ML
30 SOLUTION ORAL 2 TIMES DAILY
Status: DISCONTINUED | OUTPATIENT
Start: 2020-07-22 | End: 2020-07-25

## 2020-07-22 RX ORDER — PANTOPRAZOLE SODIUM 40 MG/1
40 TABLET, DELAYED RELEASE ORAL DAILY
Status: DISCONTINUED | OUTPATIENT
Start: 2020-07-22 | End: 2020-07-30 | Stop reason: HOSPADM

## 2020-07-22 RX ORDER — INSULIN ASPART 100 [IU]/ML
9 INJECTION, SOLUTION INTRAVENOUS; SUBCUTANEOUS
Status: DISCONTINUED | OUTPATIENT
Start: 2020-07-22 | End: 2020-07-23

## 2020-07-22 RX ORDER — POLYETHYLENE GLYCOL 3350 17 G/17G
17 POWDER, FOR SOLUTION ORAL 2 TIMES DAILY PRN
Status: DISCONTINUED | OUTPATIENT
Start: 2020-07-22 | End: 2020-07-30 | Stop reason: HOSPADM

## 2020-07-22 RX ADMIN — ALLOPURINOL 50 MG: 100 TABLET ORAL at 09:07

## 2020-07-22 RX ADMIN — AMIODARONE HYDROCHLORIDE 400 MG: 200 TABLET ORAL at 08:07

## 2020-07-22 RX ADMIN — AZTREONAM 1000 MG: 1 INJECTION, POWDER, LYOPHILIZED, FOR SOLUTION INTRAMUSCULAR; INTRAVENOUS at 09:07

## 2020-07-22 RX ADMIN — SODIUM CITRATE AND CITRIC ACID MONOHYDRATE 30 ML: 500; 334 SOLUTION ORAL at 09:07

## 2020-07-22 RX ADMIN — VANCOMYCIN HYDROCHLORIDE 750 MG: 10 INJECTION, POWDER, LYOPHILIZED, FOR SOLUTION INTRAVENOUS at 12:07

## 2020-07-22 RX ADMIN — GABAPENTIN 100 MG: 100 CAPSULE ORAL at 08:07

## 2020-07-22 RX ADMIN — MORPHINE SULFATE 2 MG: 10 INJECTION INTRAVENOUS at 09:07

## 2020-07-22 RX ADMIN — HEPARIN SODIUM 5000 UNITS: 5000 INJECTION INTRAVENOUS; SUBCUTANEOUS at 06:07

## 2020-07-22 RX ADMIN — GABAPENTIN 100 MG: 100 CAPSULE ORAL at 02:07

## 2020-07-22 RX ADMIN — ASPIRIN 81 MG 81 MG: 81 TABLET ORAL at 08:07

## 2020-07-22 RX ADMIN — AZTREONAM 1000 MG: 1 INJECTION, POWDER, LYOPHILIZED, FOR SOLUTION INTRAMUSCULAR; INTRAVENOUS at 01:07

## 2020-07-22 RX ADMIN — CLOPIDOGREL BISULFATE 75 MG: 75 TABLET, FILM COATED ORAL at 08:07

## 2020-07-22 RX ADMIN — INSULIN ASPART 4 UNITS: 100 INJECTION, SOLUTION INTRAVENOUS; SUBCUTANEOUS at 09:07

## 2020-07-22 RX ADMIN — ACETAMINOPHEN 650 MG: 325 TABLET ORAL at 06:07

## 2020-07-22 RX ADMIN — MORPHINE SULFATE 2 MG: 10 INJECTION INTRAVENOUS at 03:07

## 2020-07-22 RX ADMIN — AZTREONAM 1000 MG: 1 INJECTION, POWDER, LYOPHILIZED, FOR SOLUTION INTRAMUSCULAR; INTRAVENOUS at 05:07

## 2020-07-22 RX ADMIN — ATORVASTATIN CALCIUM 80 MG: 40 TABLET, FILM COATED ORAL at 08:07

## 2020-07-22 RX ADMIN — GABAPENTIN 100 MG: 100 CAPSULE ORAL at 09:07

## 2020-07-22 RX ADMIN — PANTOPRAZOLE SODIUM 40 MG: 40 TABLET, DELAYED RELEASE ORAL at 09:07

## 2020-07-22 RX ADMIN — INSULIN ASPART 9 UNITS: 100 INJECTION, SOLUTION INTRAVENOUS; SUBCUTANEOUS at 12:07

## 2020-07-22 RX ADMIN — CARVEDILOL 6.25 MG: 6.25 TABLET, FILM COATED ORAL at 08:07

## 2020-07-22 RX ADMIN — INSULIN ASPART 9 UNITS: 100 INJECTION, SOLUTION INTRAVENOUS; SUBCUTANEOUS at 05:07

## 2020-07-22 RX ADMIN — HEPARIN SODIUM 5000 UNITS: 5000 INJECTION INTRAVENOUS; SUBCUTANEOUS at 02:07

## 2020-07-22 RX ADMIN — HEPARIN SODIUM 5000 UNITS: 5000 INJECTION INTRAVENOUS; SUBCUTANEOUS at 09:07

## 2020-07-22 RX ADMIN — DIPHENHYDRAMINE HYDROCHLORIDE 25 MG: 25 CAPSULE ORAL at 06:07

## 2020-07-22 RX ADMIN — SODIUM CITRATE AND CITRIC ACID MONOHYDRATE 30 ML: 500; 334 SOLUTION ORAL at 02:07

## 2020-07-22 RX ADMIN — INSULIN DETEMIR 15 UNITS: 100 INJECTION, SOLUTION SUBCUTANEOUS at 08:07

## 2020-07-22 NOTE — ASSESSMENT & PLAN NOTE
MRI confirms osteo. Bone Bx- 7/18  Appreciate consultants input.  Patient refusing amputation and wants to try IV ABx's. On Aztreonam.  ID consulted for ABx recommendations.  Vascular Surgery consulted and planning angiogram.  CKD and Nephrology consulted.  Vascular discussed case with Nephrology and will hold off on Angio until renal function optimized.  Increase in WBC may be related to steroids.  Repeat in Am.

## 2020-07-22 NOTE — ASSESSMENT & PLAN NOTE
Appreciate Nephrology input.  Creat improving since admission.  Hyperkalemia on 7/21.  Treated with Calcium, Albuterol and Kayexalate.  Multiple bowel movements overnight.  Hyperkalemia resolved.

## 2020-07-22 NOTE — ASSESSMENT & PLAN NOTE
Uncontrolled with hyperglycemia. Manifestations likely of CKD 3    Will check an A1c- > 14.  Glucose much higher today, but she did receive doses of steroids as pre medication for possible angio.  Continue current management and adjust if remains hyperglycemic off steroids.  Add insulin with meals.

## 2020-07-22 NOTE — PLAN OF CARE
Problem: Wound  Goal: Optimal Wound Healing  Outcome: Ongoing, Progressing     Problem: Fall Injury Risk  Goal: Absence of Fall and Fall-Related Injury  Outcome: Ongoing, Progressing     Problem: Adult Inpatient Plan of Care  Goal: Plan of Care Review  Outcome: Ongoing, Progressing  Goal: Patient-Specific Goal (Individualization)  Outcome: Ongoing, Progressing  Goal: Absence of Hospital-Acquired Illness or Injury  Outcome: Ongoing, Progressing  Goal: Optimal Comfort and Wellbeing  Outcome: Ongoing, Progressing  Goal: Readiness for Transition of Care  Outcome: Ongoing, Progressing  Goal: Rounds/Family Conference  Outcome: Ongoing, Progressing     Problem: Diabetes Comorbidity  Goal: Blood Glucose Level Within Desired Range  Outcome: Ongoing, Progressing     Problem: Electrolyte Imbalance (Acute Kidney Injury/Impairment)  Goal: Serum Electrolyte Balance  Outcome: Ongoing, Progressing     Problem: Fluid Imbalance (Acute Kidney Injury/Impairment)  Goal: Optimal Fluid Balance  Outcome: Ongoing, Progressing     Problem: Hematologic Alteration (Acute Kidney Injury/Impairment)  Goal: Hemoglobin, Hematocrit and Platelets Within Normal Range  Outcome: Ongoing, Progressing     Problem: Oral Intake Inadequate (Acute Kidney Injury/Impairment)  Goal: Optimal Nutrition Intake  Outcome: Ongoing, Progressing     Problem: Renal Function Impairment (Acute Kidney Injury/Impairment)  Goal: Effective Renal Function  Outcome: Ongoing, Progressing     Problem: Infection  Goal: Infection Symptom Resolution  Outcome: Ongoing, Progressing     Problem: Skin Injury Risk Increased  Goal: Skin Health and Integrity  Outcome: Ongoing, Progressing

## 2020-07-22 NOTE — PROGRESS NOTES
[facility]  Podiatry  Progress Note    Patient Name: Suyapa Connelly  MRN: [id number]  Admission Date: 7/17/2020  Hospital Length of Stay: 5 days  Attending Physician: Silvestre Espana MD  Primary Care Provider: Erlinda Rahman NP     Subjective:     History of Present Illness: 53 y/o male PMH DM2, PAD, CABG admitted for left heel infection. Reports wound started as a blister in Jan. 2020 after she was in a coma for 11 days following CABG procedure. Reports she was then discharged to rehab. Reports applying neosporin to left heel ulceration however the wound has progressively worsened in the past week. Reports she did not seek medical attention for the left heel wound prior due to COVID concerns as she was taking care of her elderly parents. Reports nausea today however reports this has been present for several weeks was taking zofran for this. Denies fevers, chills.      07/21/2020 patient seen at bedside.  She is resting comfortably.  She relates pain well controlled with current narcotic regimen.  She relates she is disappointed with finding that she has decreased blood flow to the foot.  No new pedal complaints.   She denies nausea, vomiting, fever, chills    7/22/20: Patient seen bedside. Heel protector boots in place. Pending angio per vascular surgery. Patient reports taking prednisone yesterday 2/2 allergic reaction to Cipro.     Scheduled Meds:   allopurinoL  50 mg Oral Daily    amiodarone  400 mg Oral Daily    aspirin  81 mg Oral Daily    atorvastatin  80 mg Oral Daily    aztreonam  1,000 mg Intravenous Q8H    carvediloL  6.25 mg Oral Daily    clopidogreL  75 mg Oral Daily    gabapentin  100 mg Oral TID    heparin (porcine)  5,000 Units Subcutaneous Q8H    insulin aspart U-100  9 Units Subcutaneous TID WM    insulin detemir U-100  15 Units Subcutaneous Daily    pantoprazole  40 mg Oral Daily    vancomycin (VANCOCIN) IVPB  750 mg Intravenous Once     Continuous  Infusions:    PRN Meds:acetaminophen, acetaminophen, dextrose 50%, diphenhydrAMINE, glucagon (human recombinant), insulin aspart U-100, melatonin, morphine, ondansetron, polyethylene glycol, sodium chloride 0.9%, Pharmacy to dose Vancomycin consult **AND** vancomycin - pharmacy to dose    Review of patient's allergies indicates:   Allergen Reactions    Ciprofloxacin      Itchiness    Contrast media      Kidney injury    Pcn [penicillins]      Rash; tolerated ceftriaxone on 1/13/20        Past Medical History:   Diagnosis Date    Anxiety     Depression     Diabetes mellitus     type 2    GERD (gastroesophageal reflux disease)     Hyperlipemia     Hypertension     Myocardial infarction 2010    minor-caused by stress per pt.     Past Surgical History:   Procedure Laterality Date    Angiogram - Right Extremity Right 7/9/15    angiogram-left leg  10/6/15    CATHETERIZATION OF BOTH LEFT AND RIGHT HEART N/A 12/18/2019    Procedure: CATHETERIZATION, HEART, BOTH LEFT AND RIGHT;  Surgeon: Que Fernando III, MD;  Location: [facility] CATH LAB;  Service: Cardiology;  Laterality: N/A;    CORONARY ANGIOGRAPHY N/A 12/18/2019    Procedure: ANGIOGRAM, CORONARY ARTERY;  Surgeon: Que Fernando III, MD;  Location: [facility] CATH LAB;  Service: Cardiology;  Laterality: N/A;    CORONARY ARTERY BYPASS GRAFT (CABG) N/A 1/14/2020    Procedure: CORONARY ARTERY BYPASS GRAFT (CABG) x 1     Off Pump;  Surgeon: Huang Altamirano MD;  Location: [facility] OR [address] [place] [address];  Service: Cardiovascular;  Laterality: N/A;    INSERTION OF TUNNELED CENTRAL VENOUS HEMODIALYSIS CATHETER N/A 1/27/2020    Procedure: Insertion, Catheter, Central Venous, Hemodialysis;  Surgeon: ESTEBAN Gomez III, MD;  Location: [facility] CATH LAB;  Service: Peripheral Vascular;  Laterality: N/A;       Family History     None        Tobacco Use    Smoking status: Never Smoker    Smokeless tobacco: Never Used   Substance and Sexual Activity    Alcohol use: No    Drug use:  Yes     Types: Marijuana     Comment: used yesterday    Sexual activity: Not on file     Review of Systems   Constitutional: Negative.    Respiratory: Negative.    Cardiovascular: Negative.    Gastrointestinal: Negative.    Genitourinary: Negative.    Musculoskeletal: Positive for arthralgias.   Skin: Positive for wound.   Neurological: Negative.    Psychiatric/Behavioral: Negative.      Objective:     Vital Signs (Most Recent):  Temp: 97.8 °F (36.6 °C) (07/22/20 1206)  Pulse: 92 (07/22/20 1206)  Resp: 16 (07/22/20 1206)  BP: 136/67 (07/22/20 1206)  SpO2: 100 % (07/22/20 1206) Vital Signs (24h Range):  Temp:  [96.9 °F (36.1 °C)-98.5 °F (36.9 °C)] 97.8 °F (36.6 °C)  Pulse:  [] 92  Resp:  [16-18] 16  SpO2:  [97 %-100 %] 100 %  BP: (121-175)/(59-83) 136/67     Weight: 87.5 kg (192 lb 14.4 oz)  Body mass index is 32.1 kg/m².    Foot Exam    General  Orientation: alert and oriented to person, place, and time       Right Foot/Ankle     Inspection and Palpation  Swelling: none     Neurovascular  Dorsalis pedis: 1+  Posterior tibial: 1+  Saphenous nerve sensation: diminished  Tibial nerve sensation: diminished  Superficial peroneal nerve sensation: diminished  Deep peroneal nerve sensation: diminished  Sural nerve sensation: diminished      Left Foot/Ankle      Inspection and Palpation  Tenderness: calcaneus tenderness   Skin Exam: ulcer;     Neurovascular  Dorsalis pedis: 1+  Posterior tibial: 1+  Saphenous nerve sensation: diminished  Tibial nerve sensation: diminished  Superficial peroneal nerve sensation: diminished  Deep peroneal nerve sensation: diminished  Sural nerve sensation: diminished          7/22/20:  Wound 1: Left heel   Measurement:  5.3cmx5.3cmx0.5cm   Base: fibrous and necrotic eschar base  Periwound skin: HPK  Drainage: serous  Erythema: mild  Probe: probe to bone.   Periwound maceration noted.             7/21/2020    Wound 1: Left heel   Measurement:  5.3cmx5.3cmx0.5cm   Base: fibrous and  necrotic eschar base  Periwound skin: HPK  Drainage: serous  Erythema: mild  Probe: probe to bone.             7/18/20:      Pre debridement         Post debridement         Laboratory:  CBC:   Recent Labs   Lab 07/22/20  0453   WBC 15.74*   RBC 2.84*   HGB 8.8*   HCT 26.9*      MCV 95   MCH 31.0   MCHC 32.7     CMP:   Recent Labs   Lab 07/17/20  1631  07/22/20  0453   *   < > 408*   CALCIUM 9.5   < > 8.1*   ALBUMIN 3.0*   < > 2.1*   PROT 8.5*  --   --    *   < > 130*   K 4.3   < > 4.7   CO2 18*   < > 19*      < > 101   BUN 38*   < > 50*   CREATININE 2.5*   < > 2.0*   ALKPHOS 115  --   --    ALT <5*  --   --    AST 11  --   --    BILITOT 0.5  --   --     < > = values in this interval not displayed.       Microbiology Results (last 7 days)     Procedure Component Value Units Date/Time    Blood Culture #2 **CANNOT BE ORDERED STAT** [[id number]] Collected: 07/17/20 1712    Order Status: Completed Specimen: Blood from Peripheral, Hand, Left Updated: 07/21/20 1903     Blood Culture, Routine No Growth to date      No Growth to date      No Growth to date      No Growth to date      No Growth to date    Blood Culture #1 **CANNOT BE ORDERED STAT** [[id number]] Collected: 07/17/20 1631    Order Status: Completed Specimen: Blood from Peripheral, Antecubital, Right Updated: 07/21/20 1903     Blood Culture, Routine No Growth to date      No Growth to date      No Growth to date      No Growth to date      No Growth to date    Aerobic culture [[id number]]  (Abnormal)  (Susceptibility) Collected: 07/18/20 0929    Order Status: Completed Specimen: Bone from Foot, Left Updated: 07/21/20 0745     Aerobic Bacterial Culture ENTEROCOCCUS FAECALIS  Moderate        STREPTOCOCCUS AGALACTIAE (GROUP B)  Few  Beta-hemolytic streptococci are routinely susceptible to   penicillins,cephalosporins and carbapenems.        ENTEROBACTER CLOACAE  Rare      Gram stain [[id number]] Collected: 07/18/20 0929    Order Status:  Completed Specimen: Bone from Foot, Left Updated: 07/19/20 0346     Gram Stain Result No WBC's      No organisms seen          Diagnostic Results:  X-Ray Foot Complete Left  Order: [id number]  Status:  Final result   Visible to patient:  No (not released) Next appt:  None Dx:  Non healing left heel wound  Details    Reading Physician Reading Date Result Priority   Solomon Jauregui DO  [phone]  [phone] 7/17/2020 STAT      Narrative & Impression     EXAMINATION:  XR FOOT COMPLETE 3 VIEW LEFT     CLINICAL HISTORY:  .  Unspecified open wound, left foot, initial encounter     TECHNIQUE:  AP, lateral and oblique views of the left foot were performed.     COMPARISON:  None     FINDINGS:  No evidence for acute fracture line or dislocation of the left foot.  Prominent mixed density opacity overlies the 1st distal phalanx with radiopaque and soft tissue density overall indeterminate.  Clinical correlation advised.     There is a large lucency projected over the calcaneal soft tissues concerning for possible ulceration.  No definite bony erosion of the underlying calcaneus.  Clinical correlation and further evaluation with MRI as warranted if patient compatible.  There is prominent vascular calcifications within the soft tissues.     Impression:     Please see above              MRI Foot (Hindfoot) Left Without Contrast  Order: [id number]  Status:  Final result   Visible to patient:  No (not released) Next appt:  None  Details    Reading Physician Reading Date Result Priority   Ayesha Lindo MD  [phone] 7/17/2020 STAT      Narrative & Impression     EXAMINATION:  MRI FOOT (HINDFOOT) LEFT WITHOUT CONTRAST     CLINICAL HISTORY:  Osteomyelitis suspected, diabetic;calcaneus osteo;     TECHNIQUE:  Multiplanar, multisequence images were obtained of the left hind foot without the use of IV contrast.     COMPARISON:  Left foot radiographs from the same date.     FINDINGS:  Osseous edema with cortical indistinctness  and T1 hypointensity are seen involving the posterior aspect of the calcaneus consistent with osteomyelitis, noting overlying ulceration seen posteriorly in this region.  Achilles tendon is intact.  Remaining visualized osseous structures show no significant osseous edema, fracture, or marrow replacement process.  No focal fluid collections seen.  There is posterior subcutaneous edema.  No significant ankle joint effusion.  Mild edema is also noted in Kager's fat pad.     Impression:     Osteomyelitis involving the posterior aspect of the calcaneus.           US Lower Extremity Arteries Bilateral  Order: [id number]  Status:  Final result   Visible to patient:  No (not released) Next appt:  None Dx:  PAD (peripheral artery disease)  Details    Reading Physician Reading Date Result Priority   Ayesha Lindo MD  [phone] 7/17/2020 STAT      Narrative & Impression     EXAMINATION:  US LOWER EXTREMITY ARTERIES BILATERAL     CLINICAL HISTORY:  Peripheral vascular disease, unspecified     TECHNIQUE:  Bilateral spectral, color and grayscale images of the large arteries of both lower extremities were performed.     COMPARISON:  CT lower extremity runoff from 01/03/2020.     FINDINGS:  Right lower extremity:     Common femoral artery: 128 cm/sec, biphasic     SFA proximal: 210 cm/sec, triphasic     SFA mid: 105 cm/sec, triphasic     SFA distal: 86 cm/sec, biphasic     Popliteal artery graft: Patent with velocity measuring 479 cm/sec at the proximal anastomosis, with velocities ranging from 49-54 cm/sec within the proximal to distal portions of the graft with monophasic waveform seen throughout.     Posterior tibial artery: 24 cm/sec     Anterior tibial artery: 55 cm/sec     Left lower extremity:     Common femoral artery to popliteal graft: Patent with velocity measuring 184 cm/sec at the proximal anastomosis with triphasic waveforms.  Monophasic waveforms are seen distally within the graft with velocities measuring 67  cm/sec proximally, 25 cm/sec midportion, and 53 cm/sec distally.     SFA proximal: 15 cm/sec, monophasic     SFA mid: 67 cm/sec, monophasic     SFA distal: 175 cm/sec, monophasic     Popliteal artery: 28 cm/sec, monophasic     Posterior tibial artery: 17 cm/sec     Anterior tibial artery: 57 cm/sec     Impression:     1. Patent right lower extremity popliteal artery bypass graft.  Elevated velocities seen at the proximal anastomosis measuring 479 cm/sec consistent with hemodynamically significant stenosis.  2. Patent left lower extremity common femoral artery to popliteal artery bypass graft.  Doubling of velocities seen within the mid to distal portion of the graft suggestive for hemodynamically significant stenosis.  3. Doubling of velocities with hemodynamically significant stenosis between the left popliteal artery and anterior tibial artery.                 Left   Left arm  mmHg      Left posterior tibial 53 mmHg      Left dorsalis pedis 255 mmHg      Left ATTILA 2.13       Left toe pressure 0 mmHg      Left TBI 0             Clinical Findings:  Left heel ulceration with probe to bone.    Assessment/Plan:     Active Diagnoses:    Diagnosis Date Noted POA    PRINCIPAL PROBLEM:  Acute osteomyelitis of left calcaneus [M86.172] 07/18/2020 Yes    Hyponatremia [E87.1] 07/22/2020 Yes    Infection due to enterococcus [A49.1] 07/20/2020 Yes    Gram-negative infection [A49.9] 07/20/2020 Yes    Stage 3 chronic kidney disease [N18.3] 07/18/2020 Yes    Non healing left heel wound [S91.302A] 07/17/2020 Yes    S/P CABG x 1 [Z95.1] 01/27/2020 Not Applicable    Mixed hyperlipidemia [E78.2] 12/13/2019 Yes    Chronic combined systolic and diastolic heart failure [I50.42] 12/08/2019 Yes    Type 2 diabetes mellitus without complication, with long-term current use of insulin [E11.9, Z79.4] 05/05/2018 Not Applicable    Essential hypertension [I10] 05/05/2018 Yes    Peripheral artery disease [I73.9]  Yes      Problems  Resolved During this Admission:     WBC increased today possibly 2/2 leukemoid reaction patient taking prednisone. Discussed case with ID Dr. Shar Myles     Discussed two options with patient. L BKA vs IV abx for six weeks with aggressive wound care for several months with no guarantee of wound resolution.  Patient would like to try IV abx, wound care at this time.     ID on board to tailor abx.     Arterial studies show severe PAOD, vascular surgery on board with plans for intervention.    Pending vascular surgery reccs. Will plan for further debridement in OR this week.     Podiatry will follow.     Continue Nursing dressing changes.     Thank you for your consult. I will follow-up with patient. Please contact us if you have any additional questions.    Rosio Mayes DPM  Podiatry  [facility]

## 2020-07-22 NOTE — NURSING
Bedside Report given to DEANGELO Colby. Walking rounds completed. Visualized and assessed patient NAD noted. Safety precautions maintained and call light within reach.     Chart check completed.

## 2020-07-22 NOTE — PROGRESS NOTES
[facility]  Infectious Disease  Progress Note    Patient Name: Suyapa Connelly  MRN: [id number]  Admission Date: 7/17/2020  Length of Stay: 5 days  Attending Physician: Silvestre Espana MD  Primary Care Provider: Erlinda Rahman NP    Isolation Status: No active isolations  Assessment/Plan:      * Acute osteomyelitis of left calcaneus  Osteomyelitis of the calcaneous presumed to be acute however pending pathology for confirmation. Cultures now with Enterococcus, E cloacae, and GBS. Afebrile and with leukocytosis which may be a leukemoid reaction to steroids.   · Continue aztreonam for now.   · Patient has tolerated cephalosporins in past. Recommend switching aztreonam to cefepime 2 gm IV every 12 hours after her surgical intervention today.   · Continue vancomycin.   · Patient likely to require IV antibiotics. Please discuss PICC vs subclavian catheter for IV antibiotics with nephrology.   · Should her WBC continue to increase would repeat blood cultures and consider repeat imaging of her foot.      Infection due to enterococcus  See above      Gram-negative infection  See above        Anticipated Disposition: per primary    Thank you for your consult. I will follow-up with patient. Please contact us if you have any additional questions.    Verenice Myles MD  Infectious Disease  [facility]    Subjective:     Principal Problem:Acute osteomyelitis of left calcaneus    HPI: A 54-year-old woman with CAD, HF-EF 35%, PAD, s/p bilateral lower extremity stents, DM 2,  Anxiety, depression, and chronic open wound of the left heel who developed malodorous drainage from her wound. This was associated with pain and hyperglycemia. She was evaluated on  ED where she was afebrile and without leukocytosis. Further work up revealed elevated creatinine level, an ESR of 139, and CRP of 9.2. MRI showed evidence of osteomyelitis of the calcaneous. She was evaluated by Podiatry and  "subsequently underwent bone biopsy for pathology and culture. Cultures with Enterococcus pending identification and sensitivities.     Mrs. Connelly has allergy to penicillin, Bactrim, and clindamycin. She has a pet dog. She smoke marijuana occasionally. She enjoys fishing.     Infectious Diseases consulted for management recommendations.     Interval History: "I'm going for surgery today". 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 Wound stable. Unable to undergo angiogram due to creatinine level. Pending angiogram, possible revascularization, and debridement of infected bone tissue.       Review of Systems   Constitutional: Negative for chills, fatigue, fever and unexpected weight change.   HENT: Negative for ear pain, facial swelling, hearing loss, mouth sores, nosebleeds, rhinorrhea, sinus pressure, sore throat, tinnitus, trouble swallowing and voice change.    Eyes: Negative for photophobia, pain, redness and visual disturbance.   Respiratory: Negative for cough, chest tightness, shortness of breath and wheezing.    Cardiovascular: Negative for chest pain, palpitations and leg swelling.   Gastrointestinal: Negative for abdominal pain, blood in stool, constipation, diarrhea, nausea and vomiting.   Endocrine: Negative for cold intolerance, heat intolerance, polydipsia, polyphagia and polyuria.   Genitourinary: Negative for decreased urine volume, dysuria, flank pain, frequency, hematuria, menstrual problem, urgency, vaginal bleeding, vaginal discharge and vaginal pain.   Musculoskeletal: Negative for arthralgias, back pain, joint swelling, myalgias and neck pain.   Skin: Positive for wound. Negative for rash.   Allergic/Immunologic: Negative for environmental allergies, food allergies and immunocompromised state.   Neurological: Negative for dizziness, seizures, syncope, weakness, light-headedness, numbness and headaches.   Hematological: Negative for adenopathy. Does not " bruise/bleed easily.   Psychiatric/Behavioral: Negative for confusion, hallucinations, self-injury, sleep disturbance and suicidal ideas. The patient is not nervous/anxious.      Objective:     Vital Signs (Most Recent):  Temp: 96.9 °F (36.1 °C) (07/22/20 0740)  Pulse: 101 (07/22/20 0740)  Resp: 16 (07/22/20 0925)  BP: (!) 175/83 (07/22/20 0740)  SpO2: 97 % (07/22/20 0740) Vital Signs (24h Range):  Temp:  [96.9 °F (36.1 °C)-98.5 °F (36.9 °C)] 96.9 °F (36.1 °C)  Pulse:  [] 101  Resp:  [16-18] 16  SpO2:  [95 %-98 %] 97 %  BP: (121-175)/(59-83) 175/83     Weight: 87.5 kg (192 lb 14.4 oz)  Body mass index is 32.1 kg/m².    Estimated Creatinine Clearance: 37.4 mL/min (A) (based on SCr of 1.9 mg/dL (H)).    Physical Exam  Vitals signs and nursing note reviewed.   Constitutional:       General: She is not in acute distress.     Appearance: She is well-developed. She is not diaphoretic.   HENT:      Head: Normocephalic and atraumatic.      Right Ear: Hearing and external ear normal.      Left Ear: Hearing and external ear normal.      Nose: Nose normal.      Mouth/Throat:      Mouth: Mucous membranes are moist.      Pharynx: Oropharynx is clear. Uvula midline. No oropharyngeal exudate.   Eyes:      General: No scleral icterus.        Right eye: No discharge.         Left eye: No discharge.   Neck:      Musculoskeletal: Normal range of motion and neck supple.      Thyroid: No thyromegaly.      Trachea: No tracheal deviation.   Cardiovascular:      Rate and Rhythm: Normal rate and regular rhythm.      Heart sounds: Normal heart sounds. No murmur. No friction rub. No gallop.    Pulmonary:      Effort: Pulmonary effort is normal. No respiratory distress.      Breath sounds: Normal breath sounds. No stridor. No wheezing or rales.   Chest:      Chest wall: No tenderness.   Abdominal:      General: Bowel sounds are normal. There is no distension.      Palpations: Abdomen is soft.      Tenderness: There is no abdominal  tenderness. There is no guarding or rebound.   Musculoskeletal: Normal range of motion.         General: No tenderness.      Comments: Left foot wrapped with gauze dressing.    Lymphadenopathy:      Cervical: No cervical adenopathy.   Skin:     General: Skin is warm and dry.      Coloration: Skin is not pale.      Findings: No erythema or rash.   Neurological:      Mental Status: She is alert and oriented to person, place, and time.      Cranial Nerves: No cranial nerve deficit.      Coordination: Coordination normal.   Psychiatric:         Behavior: Behavior normal.         Thought Content: Thought content normal.         Judgment: Judgment normal.         Significant Labs:   Blood Culture:   Recent Labs   Lab 07/17/20  1631 07/17/20  1712   LABBLOO No Growth to date  No Growth to date  No Growth to date  No Growth to date  No Growth to date No Growth to date  No Growth to date  No Growth to date  No Growth to date  No Growth to date     BMP:   Recent Labs   Lab 07/21/20  0925 07/21/20  1151   *  --    *  --    K 5.7* 5.4*     --    CO2 16*  --    BUN 47*  --    CREATININE 1.9*  --    CALCIUM 9.0  --      CBC:   Recent Labs   Lab 07/22/20  0453   WBC 15.74*   HGB 8.8*   HCT 26.9*        Wound Culture:   Recent Labs   Lab 07/18/20  0929   LABAERO ENTEROCOCCUS FAECALIS  Moderate  *  STREPTOCOCCUS AGALACTIAE (GROUP B)  Few  Beta-hemolytic streptococci are routinely susceptible to   penicillins,cephalosporins and carbapenems.  *  ENTEROBACTER CLOACAE  Rare  *       Significant Imaging: I have reviewed all pertinent imaging results/findings within the past 24 hours.

## 2020-07-22 NOTE — PROGRESS NOTES
[facility] Hospital Medicine  Progress Note    Patient Name: Suyapa Connelly  MRN: [id number]  Patient Class: IP- Inpatient   Admission Date: 7/17/2020  Length of Stay: 5 days  Attending Physician: Silvestre Espana MD  Primary Care Provider: Erlinda Rahman NP        Subjective:     Principal Problem:Acute osteomyelitis of left calcaneus        HPI:  Suyapa Connelly is a 54 y.o. female that (in part)  has a past medical history of Anxiety, Depression, Diabetes mellitus, GERD (gastroesophageal reflux disease), Hyperlipemia, Hypertension, and Myocardial infarction.  has a past surgical history that includes Angiogram - Right Extremity (Right, 7/9/15); angiogram-left leg (10/6/15); Catheterization of both left and right heart (N/A, 12/18/2019); Coronary angiography (N/A, 12/18/2019); Coronary Artery Bypass Graft (CABG) (N/A, 1/14/2020); and Insertion of tunneled central venous hemodialysis catheter (N/A, 1/27/2020). Presents to [facility] Emergency Department complaining of the left heel foot wound.  This is been there for several weeks with progressive worsening.  Poor healing.  Patient became concerned due to malodorous drainage.  Pain with ambulation and weight-bearing.  Associated hyperglycemia.  Denies fever chills.    In the emergency department physical exam concerning for osteomyelitis secondary to diabetic foot ulcer in the setting of uncontrolled diabetes.  Routine laboratory studies revealed hyperglycemia.  X-ray of the foot performed.  Concerning for osteomyelitis.  MRI confirmed this.  Podiatry was consulted.  Broad-spectrum antibiotics were initiated in the ED a special consideration given to risk of Pseudomonas infection.    Hospital medicine has been asked to admit to inpatient for further evaluation and treatment.     Overview/Hospital Course:  Suyapa Connelly is a 54 y.o. female that (in part)  has a past medical history of Anxiety, Depression, Diabetes  mellitus, GERD (gastroesophageal reflux disease), Hyperlipemia, Hypertension, and Myocardial infarction.  has a past surgical history that includes Angiogram - Right Extremity (Right, 7/9/15); angiogram-left leg (10/6/15); Catheterization of both left and right heart (N/A, 12/18/2019); Coronary angiography (N/A, 12/18/2019); Coronary Artery Bypass Graft (CABG) (N/A, 1/14/2020); and Insertion of tunneled central venous hemodialysis catheter (N/A, 1/27/2020). Presents to [facility] Emergency Department complaining of the left heel foot wound.  MRI confirmed osteo. Podiatry, ID, and vascular were consulted.  Podiatry discussed options with patient. She has chosen 6 weeks of IV abx. ID has been consulted to tailor abx. She has been continued on Aztreonam.  Blood cultures were NG. Bone bx on 7/18. Vascular Surgery consulted and planning angiogram.  Patient with renal failure and Nephrology consulted.    Interval History: Complaining of diarrhea all night.    Review of Systems   HENT: Negative for ear discharge and ear pain.    Eyes: Negative for discharge and itching.   Endocrine: Negative for cold intolerance and heat intolerance.   Neurological: Negative for seizures and syncope.     Objective:     Vital Signs (Most Recent):  Temp: 97.8 °F (36.6 °C) (07/22/20 1206)  Pulse: 92 (07/22/20 1206)  Resp: 16 (07/22/20 1206)  BP: 136/67 (07/22/20 1206)  SpO2: 100 % (07/22/20 1206) Vital Signs (24h Range):  Temp:  [96.9 °F (36.1 °C)-98.5 °F (36.9 °C)] 97.8 °F (36.6 °C)  Pulse:  [] 92  Resp:  [16-18] 16  SpO2:  [97 %-100 %] 100 %  BP: (121-175)/(59-83) 136/67     Weight: 87.5 kg (192 lb 14.4 oz)  Body mass index is 32.1 kg/m².    Intake/Output Summary (Last 24 hours) at 7/22/2020 1210  Last data filed at 7/22/2020 0600  Gross per 24 hour   Intake 410 ml   Output 1100 ml   Net -690 ml      Physical Exam  Vitals signs and nursing note reviewed.   Constitutional:       General: She is not in acute  distress.     Appearance: She is well-developed. She is not diaphoretic.   HENT:      Head: Normocephalic and atraumatic.      Right Ear: Hearing and external ear normal.      Left Ear: Hearing and external ear normal.      Nose: Nose normal.      Mouth/Throat:      Mouth: Mucous membranes are moist.      Pharynx: Oropharynx is clear. Uvula midline. No oropharyngeal exudate.   Eyes:      General: No scleral icterus.        Right eye: No discharge.         Left eye: No discharge.   Neck:      Musculoskeletal: Normal range of motion and neck supple.      Thyroid: No thyromegaly.      Trachea: No tracheal deviation.   Cardiovascular:      Rate and Rhythm: Normal rate and regular rhythm.      Heart sounds: Normal heart sounds. No murmur. No friction rub. No gallop.    Pulmonary:      Effort: Pulmonary effort is normal. No respiratory distress.      Breath sounds: Normal breath sounds. No stridor. No wheezing or rales.   Chest:      Chest wall: No tenderness.   Abdominal:      General: Bowel sounds are normal. There is no distension.      Palpations: Abdomen is soft.      Tenderness: There is no abdominal tenderness. There is no guarding or rebound.   Musculoskeletal: Normal range of motion.         General: No tenderness.      Comments: Left foot wrapped with gauze dressing.    Lymphadenopathy:      Cervical: No cervical adenopathy.   Skin:     General: Skin is warm and dry.      Coloration: Skin is not pale.      Findings: No erythema or rash.   Neurological:      Mental Status: She is alert and oriented to person, place, and time.      Cranial Nerves: No cranial nerve deficit.      Coordination: Coordination normal.   Psychiatric:         Behavior: Behavior normal.         Thought Content: Thought content normal.         Judgment: Judgment normal.         Significant Labs:   BMP:   Recent Labs   Lab 07/22/20  0453   *   *   K 4.7      CO2 19*   BUN 50*   CREATININE 2.0*   CALCIUM 8.1*     CBC:    Recent Labs   Lab 07/22/20  0453   WBC 15.74*   HGB 8.8*   HCT 26.9*          Significant Imaging: I have reviewed all pertinent imaging results/findings within the past 24 hours.      Assessment/Plan:      * Acute osteomyelitis of left calcaneus  MRI confirms osteo. Bone Bx- 7/18  Appreciate consultants input.  Patient refusing amputation and wants to try IV ABx's. On Aztreonam.  ID consulted for ABx recommendations.  Vascular Surgery consulted and planning angiogram.  CKD and Nephrology consulted.  Vascular discussed case with Nephrology and will hold off on Angio until renal function optimized.  Increase in WBC may be related to steroids.  Repeat in Am.        Hyponatremia        Infection due to enterococcus        Stage 3 chronic kidney disease  Appreciate Nephrology input.  Creat improving since admission.  Hyperkalemia on 7/21.  Treated with Calcium, Albuterol and Kayexalate.  Multiple bowel movements overnight.  Hyperkalemia resolved.        Non healing left heel wound  As above.      S/P CABG x 1  No acute issues       Mixed hyperlipidemia  Stable      Chronic combined systolic and diastolic heart failure  No acute issues   EF 50% on recent echo   Gently hydrate prior to angiogram.      Essential hypertension  Continue current management.      Type 2 diabetes mellitus without complication, with long-term current use of insulin  Uncontrolled with hyperglycemia. Manifestations likely of CKD 3    Will check an A1c- > 14.  Glucose much higher today, but she did receive doses of steroids as pre medication for possible angio.  Continue current management and adjust if remains hyperglycemic off steroids.  Add insulin with meals.        Peripheral artery disease  Angiogram as above.        VTE Risk Mitigation (From admission, onward)         Ordered     heparin (porcine) injection 5,000 Units  Every 8 hours      07/17/20 2217     IP VTE HIGH RISK PATIENT  Once      07/17/20 2217     Place sequential  compression device  Until discontinued      07/17/20 2217     Place HAILEY hose  Until discontinued      07/17/20 2217                      Silvestre Espana MD  Department of Hospital Medicine   [facility]

## 2020-07-22 NOTE — SUBJECTIVE & OBJECTIVE
"Interval History: "I'm going for surgery today". 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 Wound stable. Unable to undergo angiogram due to creatinine level. Pending angiogram, possible revascularization, and debridement of infected bone tissue.       Review of Systems   Constitutional: Negative for chills, fatigue, fever and unexpected weight change.   HENT: Negative for ear pain, facial swelling, hearing loss, mouth sores, nosebleeds, rhinorrhea, sinus pressure, sore throat, tinnitus, trouble swallowing and voice change.    Eyes: Negative for photophobia, pain, redness and visual disturbance.   Respiratory: Negative for cough, chest tightness, shortness of breath and wheezing.    Cardiovascular: Negative for chest pain, palpitations and leg swelling.   Gastrointestinal: Negative for abdominal pain, blood in stool, constipation, diarrhea, nausea and vomiting.   Endocrine: Negative for cold intolerance, heat intolerance, polydipsia, polyphagia and polyuria.   Genitourinary: Negative for decreased urine volume, dysuria, flank pain, frequency, hematuria, menstrual problem, urgency, vaginal bleeding, vaginal discharge and vaginal pain.   Musculoskeletal: Negative for arthralgias, back pain, joint swelling, myalgias and neck pain.   Skin: Positive for wound. Negative for rash.   Allergic/Immunologic: Negative for environmental allergies, food allergies and immunocompromised state.   Neurological: Negative for dizziness, seizures, syncope, weakness, light-headedness, numbness and headaches.   Hematological: Negative for adenopathy. Does not bruise/bleed easily.   Psychiatric/Behavioral: Negative for confusion, hallucinations, self-injury, sleep disturbance and suicidal ideas. The patient is not nervous/anxious.      Objective:     Vital Signs (Most Recent):  Temp: 96.9 °F (36.1 °C) (07/22/20 0740)  Pulse: 101 (07/22/20 0740)  Resp: 16 (07/22/20 0925)  BP: (!) 175/83 (07/22/20 " 0740)  SpO2: 97 % (07/22/20 0740) Vital Signs (24h Range):  Temp:  [96.9 °F (36.1 °C)-98.5 °F (36.9 °C)] 96.9 °F (36.1 °C)  Pulse:  [] 101  Resp:  [16-18] 16  SpO2:  [95 %-98 %] 97 %  BP: (121-175)/(59-83) 175/83     Weight: 87.5 kg (192 lb 14.4 oz)  Body mass index is 32.1 kg/m².    Estimated Creatinine Clearance: 37.4 mL/min (A) (based on SCr of 1.9 mg/dL (H)).    Physical Exam  Vitals signs and nursing note reviewed.   Constitutional:       General: She is not in acute distress.     Appearance: She is well-developed. She is not diaphoretic.   HENT:      Head: Normocephalic and atraumatic.      Right Ear: Hearing and external ear normal.      Left Ear: Hearing and external ear normal.      Nose: Nose normal.      Mouth/Throat:      Mouth: Mucous membranes are moist.      Pharynx: Oropharynx is clear. Uvula midline. No oropharyngeal exudate.   Eyes:      General: No scleral icterus.        Right eye: No discharge.         Left eye: No discharge.   Neck:      Musculoskeletal: Normal range of motion and neck supple.      Thyroid: No thyromegaly.      Trachea: No tracheal deviation.   Cardiovascular:      Rate and Rhythm: Normal rate and regular rhythm.      Heart sounds: Normal heart sounds. No murmur. No friction rub. No gallop.    Pulmonary:      Effort: Pulmonary effort is normal. No respiratory distress.      Breath sounds: Normal breath sounds. No stridor. No wheezing or rales.   Chest:      Chest wall: No tenderness.   Abdominal:      General: Bowel sounds are normal. There is no distension.      Palpations: Abdomen is soft.      Tenderness: There is no abdominal tenderness. There is no guarding or rebound.   Musculoskeletal: Normal range of motion.         General: No tenderness.      Comments: Left foot wrapped with gauze dressing.    Lymphadenopathy:      Cervical: No cervical adenopathy.   Skin:     General: Skin is warm and dry.      Coloration: Skin is not pale.      Findings: No erythema or rash.    Neurological:      Mental Status: She is alert and oriented to person, place, and time.      Cranial Nerves: No cranial nerve deficit.      Coordination: Coordination normal.   Psychiatric:         Behavior: Behavior normal.         Thought Content: Thought content normal.         Judgment: Judgment normal.         Significant Labs:   Blood Culture:   Recent Labs   Lab 07/17/20  1631 07/17/20  1712   LABBLOO No Growth to date  No Growth to date  No Growth to date  No Growth to date  No Growth to date No Growth to date  No Growth to date  No Growth to date  No Growth to date  No Growth to date     BMP:   Recent Labs   Lab 07/21/20  0925 07/21/20  1151   *  --    *  --    K 5.7* 5.4*     --    CO2 16*  --    BUN 47*  --    CREATININE 1.9*  --    CALCIUM 9.0  --      CBC:   Recent Labs   Lab 07/22/20  0453   WBC 15.74*   HGB 8.8*   HCT 26.9*        Wound Culture:   Recent Labs   Lab 07/18/20  0929   LABAERO ENTEROCOCCUS FAECALIS  Moderate  *  STREPTOCOCCUS AGALACTIAE (GROUP B)  Few  Beta-hemolytic streptococci are routinely susceptible to   penicillins,cephalosporins and carbapenems.  *  ENTEROBACTER CLOACAE  Rare  *       Significant Imaging: I have reviewed all pertinent imaging results/findings within the past 24 hours.

## 2020-07-22 NOTE — SUBJECTIVE & OBJECTIVE
Interval History: Complaining of diarrhea all night.    Review of Systems   HENT: Negative for ear discharge and ear pain.    Eyes: Negative for discharge and itching.   Endocrine: Negative for cold intolerance and heat intolerance.   Neurological: Negative for seizures and syncope.     Objective:     Vital Signs (Most Recent):  Temp: 97.8 °F (36.6 °C) (07/22/20 1206)  Pulse: 92 (07/22/20 1206)  Resp: 16 (07/22/20 1206)  BP: 136/67 (07/22/20 1206)  SpO2: 100 % (07/22/20 1206) Vital Signs (24h Range):  Temp:  [96.9 °F (36.1 °C)-98.5 °F (36.9 °C)] 97.8 °F (36.6 °C)  Pulse:  [] 92  Resp:  [16-18] 16  SpO2:  [97 %-100 %] 100 %  BP: (121-175)/(59-83) 136/67     Weight: 87.5 kg (192 lb 14.4 oz)  Body mass index is 32.1 kg/m².    Intake/Output Summary (Last 24 hours) at 7/22/2020 1210  Last data filed at 7/22/2020 0600  Gross per 24 hour   Intake 410 ml   Output 1100 ml   Net -690 ml      Physical Exam  Vitals signs and nursing note reviewed.   Constitutional:       General: She is not in acute distress.     Appearance: She is well-developed. She is not diaphoretic.   HENT:      Head: Normocephalic and atraumatic.      Right Ear: Hearing and external ear normal.      Left Ear: Hearing and external ear normal.      Nose: Nose normal.      Mouth/Throat:      Mouth: Mucous membranes are moist.      Pharynx: Oropharynx is clear. Uvula midline. No oropharyngeal exudate.   Eyes:      General: No scleral icterus.        Right eye: No discharge.         Left eye: No discharge.   Neck:      Musculoskeletal: Normal range of motion and neck supple.      Thyroid: No thyromegaly.      Trachea: No tracheal deviation.   Cardiovascular:      Rate and Rhythm: Normal rate and regular rhythm.      Heart sounds: Normal heart sounds. No murmur. No friction rub. No gallop.    Pulmonary:      Effort: Pulmonary effort is normal. No respiratory distress.      Breath sounds: Normal breath sounds. No stridor. No wheezing or rales.   Chest:       Chest wall: No tenderness.   Abdominal:      General: Bowel sounds are normal. There is no distension.      Palpations: Abdomen is soft.      Tenderness: There is no abdominal tenderness. There is no guarding or rebound.   Musculoskeletal: Normal range of motion.         General: No tenderness.      Comments: Left foot wrapped with gauze dressing.    Lymphadenopathy:      Cervical: No cervical adenopathy.   Skin:     General: Skin is warm and dry.      Coloration: Skin is not pale.      Findings: No erythema or rash.   Neurological:      Mental Status: She is alert and oriented to person, place, and time.      Cranial Nerves: No cranial nerve deficit.      Coordination: Coordination normal.   Psychiatric:         Behavior: Behavior normal.         Thought Content: Thought content normal.         Judgment: Judgment normal.         Significant Labs:   BMP:   Recent Labs   Lab 07/22/20  0453   *   *   K 4.7      CO2 19*   BUN 50*   CREATININE 2.0*   CALCIUM 8.1*     CBC:   Recent Labs   Lab 07/22/20  0453   WBC 15.74*   HGB 8.8*   HCT 26.9*          Significant Imaging: I have reviewed all pertinent imaging results/findings within the past 24 hours.

## 2020-07-22 NOTE — PROGRESS NOTES
[facility] [place]  Nephrology  Progress Note    Patient Name: Suyapa Connelly  MRN: [id number]  Admission Date: 7/17/2020  Hospital Length of Stay: 5 days  Attending Provider: Silvestre Espana MD   Primary Care Physician: Erlinda Rahman NP  Principal Problem:Acute osteomyelitis of left calcaneus    Consults  Subjective:     Interval History: Angiogram postponed while renal function stabilizes. Holding IVF at this time. UOP 1.1L +3x/ 24 hrs. Pt doing welll this afternoon, does endorse some intermittent SOB since admit. On Bicitra BID    Review of patient's allergies indicates:   Allergen Reactions    Ciprofloxacin      Itchiness    Contrast media      Kidney injury    Pcn [penicillins]      Rash; tolerated ceftriaxone on 1/13/20     Current Facility-Administered Medications   Medication Frequency    acetaminophen tablet 650 mg Q8H PRN    acetaminophen tablet 650 mg Q8H PRN    allopurinol split tablet 50 mg Daily    amiodarone tablet 400 mg Daily    aspirin chewable tablet 81 mg Daily    atorvastatin tablet 80 mg Daily    aztreonam 1 g in dextrose 5 % 50 mL IVPB (ready to mix system) Q8H    cadexomer iodine 0.9 % gel Daily PRN    carvediloL tablet 6.25 mg Daily    clopidogreL tablet 75 mg Daily    dextrose 50% injection 12.5 g PRN    diphenhydrAMINE capsule 25 mg Q6H PRN    gabapentin capsule 100 mg TID    glucagon (human recombinant) injection 1 mg PRN    heparin (porcine) injection 5,000 Units Q8H    insulin aspart U-100 pen 0-5 Units Q6H PRN    insulin aspart U-100 pen 9 Units TID WM    insulin detemir U-100 pen 15 Units Daily    melatonin tablet 6 mg Nightly PRN    morphine injection 2 mg Q4H PRN    ondansetron injection 8 mg Q8H PRN    pantoprazole EC tablet 40 mg Daily    polyethylene glycol packet 17 g BID PRN    sodium chloride 0.9% flush 10 mL PRN    sodium citrate-citric acid 500-334 mg/5 ml solution 30 mL BID    vancomycin - pharmacy to dose pharmacy to manage  frequency       Objective:     Vital Signs (Most Recent):  Temp: 97.8 °F (36.6 °C) (07/22/20 1206)  Pulse: 92 (07/22/20 1206)  Resp: 16 (07/22/20 1206)  BP: 136/67 (07/22/20 1206)  SpO2: 100 % (07/22/20 1206)  O2 Device (Oxygen Therapy): room air (07/22/20 0708) Vital Signs (24h Range):  Temp:  [96.9 °F (36.1 °C)-98.5 °F (36.9 °C)] 97.8 °F (36.6 °C)  Pulse:  [] 92  Resp:  [16-18] 16  SpO2:  [97 %-100 %] 100 %  BP: (121-175)/(59-83) 136/67     Weight: 87.5 kg (192 lb 14.4 oz) (07/20/20 0418)  Body mass index is 32.1 kg/m².  Body surface area is 2 meters squared.    I/O last 3 completed shifts:  In: 1960 [P.O.:360; I.V.:1500; IV Piggyback:100]  Out: 1100 [Urine:1100]    Physical Exam  Constitutional:       General: She is not in acute distress.     Appearance: She is well-developed. She is not diaphoretic.   HENT:      Head: Normocephalic and atraumatic.      Mouth/Throat:      Mouth: Mucous membranes are moist.   Eyes:      Conjunctiva/sclera: Conjunctivae normal.   Cardiovascular:      Rate and Rhythm: Normal rate and regular rhythm.      Heart sounds: No murmur.   Pulmonary:      Effort: Pulmonary effort is normal.      Breath sounds: Rales (LLL) present.   Abdominal:      General: There is no distension.      Palpations: Abdomen is soft.   Musculoskeletal:         General: Swelling (mild BLE) present. No deformity.   Skin:     General: Skin is warm and dry.      Findings: No rash.   Neurological:      Mental Status: She is alert and oriented to person, place, and time.   Psychiatric:         Behavior: Behavior normal.         Significant Labs:sure  CBC:   Recent Labs   Lab 07/22/20  0453   WBC 15.74*   RBC 2.84*   HGB 8.8*   HCT 26.9*      MCV 95   MCH 31.0   MCHC 32.7     CMP:   Recent Labs   Lab 07/17/20  1631  07/22/20  0453   *   < > 408*   CALCIUM 9.5   < > 8.1*   ALBUMIN 3.0*   < > 2.1*   PROT 8.5*  --   --    *   < > 130*   K 4.3   < > 4.7   CO2 18*   < > 19*      < > 101    BUN 38*   < > 50*   CREATININE 2.5*   < > 2.0*   ALKPHOS 115  --   --    ALT <5*  --   --    AST 11  --   --    BILITOT 0.5  --   --     < > = values in this interval not displayed.     PTH:   Recent Labs   Lab 07/22/20  0453   PTH 77.8*     Recent Labs   Lab 07/22/20  0037   COLORU Straw   SPECGRAV 1.015   PHUR 5.0   PROTEINUA 2+*   BACTERIA None   NITRITE Negative   LEUKOCYTESUR Negative   UROBILINOGEN Negative   HYALINECASTS 0     All labs within the past 24 hours have been reviewed.    Significant Imaging:  Labs: Reviewed    Assessment/Plan:   ROSLYN on CKD 3  - Has hx of HD dependant ROSLYN (LEVI) requiring RRT from 1/16/20 -3/10/20  - Recent baseline Cr unclear but based on available values, appears to be 1.4 -1.9 with GFR 30-43; though consideration for possible progression of CKD; suspect underlying diabetic nephropathy Hgb A 1c > 14  - OA 7/17, Cr 2.5 and BUN 38. Cr has continued to gradually trend down   - Etiology unclear at this time; possibly d/t hyperglycemia? Glucose 483 on admit. - Renal US 7/17 unremarkable, UA c/w ROSLYN  - Renal function overall stable today, slight increase in Cr and BUN- continue to trend; good UOP  - UA with 2+ protein 1+ blood, Akosua 21 and UPrCr 2.5 g/g  - planning to delay angiogram until renal function stabilizes  - caution with IVF in advanced CKD patients - monitor O2 closely/ need for diuresis  - renally dose meds for current GFR/ avoid nephrotoxic meds- adjusted gabapentin  - strict Is/ Os/ daily weights     Hypervolemia   - mild; some crackles to LL today, mild peripheral edema, and intermittent SOB  - on RA; good UOP  - s/p IVF yesterday for pre- angio hydration   - previously on diuretics at home - monitor for need to dose gentle PO lasix   - CXR in AM     Hyperkalemia  - K trending down with medical management  - renal diet  - f/u AM labs     Metabolic Acidosis   - likely d/t ROSLYN + CKD; exacerbated by NS  - ABG c/w primary met acidosis   - Bicitra 30 ml PO BID, titrate PRN  -  AM labs     CKD MBD  - CCa and phos nwl  - PTH and Vit D in AM     Proteinuria   - UA 2+ protein UPrCr 2.5 g/g   - would benefit fron ACE/ ARB once ROSLYN resolves  Hyperuricemia   - uric acid 8.6  - continue allopurinol 50 mg PO QD  - will trend     DOS: 07/22/2020    VENANCIO Pineda  Nephrology  [facility]

## 2020-07-22 NOTE — ASSESSMENT & PLAN NOTE
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 Cultures now with Enterococcus, E cloacae, and GBS. Afebrile and with leukocytosis which may be a leukemoid reaction to steroids.   · Continue aztreonam for now.   · Patient has tolerated cephalosporins in past. Recommend switching aztreonam to cefepime 2 gm IV every 12 hours after her surgical intervention today.   · Continue vancomycin.   · Patient likely to require IV antibiotics. Please discuss PICC vs subclavian catheter for IV antibiotics with nephrology.   · Should her WBC continue to increase would repeat blood cultures and consider repeat imaging of her foot.

## 2020-07-22 NOTE — NURSING
Report received from DEANGELO Colby. Visualized patient and assessed patient's overall condition and appearance. No acute distress noted. Will continue to monitor

## 2020-07-22 NOTE — PROGRESS NOTES
Pharmacokinetic Assessment Follow Up: IV Vancomycin    Vancomycin serum concentration assessment(s):    The random level was drawn correctly and can be used to guide therapy at this time. The measurement is within the desired definitive target range of 10 to 20 mcg/mL.    Vancomycin Regimen Plan:    Re-dose when the random level is less than 20 mcg/mL, next level to be drawn at 0400 on 7/23/2020    Drug levels (last 3 results):  Recent Labs   Lab Result Units 07/20/20  0405 07/21/20  0610 07/22/20  0453   Vancomycin, Random ug/mL 19.1 16.5 18.9       Pharmacy will continue to follow and monitor vancomycin.    Please contact pharmacy at extension [phone] for questions regarding this assessment.    Thank you for the consult,   Abdirashid Canela Jr       Patient brief summary:  Suyapa Connelly is a 54 y.o. female initiated on antimicrobial therapy with IV Vancomycin for treatment of bone/joint infection    Drug Allergies:   Review of patient's allergies indicates:   Allergen Reactions    Ciprofloxacin      Itchiness    Contrast media      Kidney injury    Pcn [penicillins]      Rash; tolerated ceftriaxone on 1/13/20       Actual Body Weight:   87.5 kg    Renal Function:   Estimated Creatinine Clearance: 35.5 mL/min (A) (based on SCr of 2 mg/dL (H)).,     Dialysis Method (if applicable):  N/A    CBC (last 72 hours):  Recent Labs   Lab Result Units 07/22/20  0453   WBC K/uL 15.74*   Hemoglobin g/dL 8.8*   Hematocrit % 26.9*   Platelets K/uL 325   Gran% % 85.7*   Lymph% % 6.0*   Mono% % 7.6   Eosinophil% % 0.0   Basophil% % 0.1   Differential Method  Automated       Metabolic Panel (last 72 hours):  Recent Labs   Lab Result Units 07/20/20  0405 07/21/20  0610 07/21/20  0925 07/21/20  1151 07/22/20  0037 07/22/20  0047 07/22/20  0453   Sodium mmol/L 133* 132* 133*  --   --   --  130*   Sodium Urine Random mmol/L  --   --   --   --   --  21  --    Potassium mmol/L 4.4 5.8* 5.7* 5.4*  --   --  4.7   Chloride mmol/L 104 107 106   --   --   --  101   CO2 mmol/L 20* 16* 16*  --   --   --  19*   Glucose mg/dL 118* 310* 333*  --   --   --  408*   Glucose, UA   --   --   --   --  3+*  --   --    BUN, Bld mg/dL 47* 47* 47*  --   --   --  50*   Creatinine mg/dL 2.4* 1.9* 1.9*  --   --   --  2.0*   Creatinine, Random Ur mg/dL  --   --   --   --   --  56.7  56.7  --    Albumin g/dL  --   --  2.3*  --   --   --  2.1*   Phosphorus mg/dL  --   --  4.0  --   --   --  3.2       Vancomycin Administrations:  vancomycin given in the last 96 hours                     vancomycin 750 mg in dextrose 5 % 250 mL IVPB (ready to mix system) (mg) 750 mg New Bag 07/21/20 1128    vancomycin 500 mg in dextrose 5 % 100 mL IVPB (ready to mix system) (mg) 500 mg New Bag 07/20/20 1228    vancomycin in dextrose 5 % 1 gram/250 mL IVPB 1,000 mg (mg) 1,000 mg New Bag 07/18/20 1711                    Microbiologic Results:  Microbiology Results (last 7 days)       Procedure Component Value Units Date/Time    Blood Culture #2 **CANNOT BE ORDERED STAT** [[id number]] Collected: 07/17/20 1712    Order Status: Completed Specimen: Blood from Peripheral, Hand, Left Updated: 07/21/20 1903     Blood Culture, Routine No Growth to date      No Growth to date      No Growth to date      No Growth to date      No Growth to date    Blood Culture #1 **CANNOT BE ORDERED STAT** [[id number]] Collected: 07/17/20 1631    Order Status: Completed Specimen: Blood from Peripheral, Antecubital, Right Updated: 07/21/20 1903     Blood Culture, Routine No Growth to date      No Growth to date      No Growth to date      No Growth to date      No Growth to date    Aerobic culture [[id number]]  (Abnormal)  (Susceptibility) Collected: 07/18/20 0929    Order Status: Completed Specimen: Bone from Foot, Left Updated: 07/21/20 0745     Aerobic Bacterial Culture ENTEROCOCCUS FAECALIS  Moderate        STREPTOCOCCUS AGALACTIAE (GROUP B)  Few  Beta-hemolytic streptococci are routinely susceptible to    penicillins,cephalosporins and carbapenems.        ENTEROBACTER CLOACAE  Rare      Gram stain [[id number]] Collected: 07/18/20 0989    Order Status: Completed Specimen: Bone from Foot, Left Updated: 07/19/20 0346     Gram Stain Result No WBC's      No organisms seen

## 2020-07-23 LAB
ANION GAP SERPL CALC-SCNC: 8 MMOL/L (ref 8–16)
BASOPHILS # BLD AUTO: 0.03 K/UL (ref 0–0.2)
BASOPHILS NFR BLD: 0.4 % (ref 0–1.9)
BUN SERPL-MCNC: 43 MG/DL (ref 6–20)
CALCIUM SERPL-MCNC: 8.3 MG/DL (ref 8.7–10.5)
CHLORIDE SERPL-SCNC: 106 MMOL/L (ref 95–110)
CO2 SERPL-SCNC: 23 MMOL/L (ref 23–29)
CREAT SERPL-MCNC: 1.5 MG/DL (ref 0.5–1.4)
DIFFERENTIAL METHOD: ABNORMAL
EOSINOPHIL # BLD AUTO: 0.1 K/UL (ref 0–0.5)
EOSINOPHIL NFR BLD: 1.1 % (ref 0–8)
ERYTHROCYTE [DISTWIDTH] IN BLOOD BY AUTOMATED COUNT: 14.7 % (ref 11.5–14.5)
EST. GFR  (AFRICAN AMERICAN): 46 ML/MIN/1.73 M^2
EST. GFR  (NON AFRICAN AMERICAN): 39 ML/MIN/1.73 M^2
GLUCOSE SERPL-MCNC: 133 MG/DL (ref 70–110)
HCT VFR BLD AUTO: 29.8 % (ref 37–48.5)
HGB BLD-MCNC: 9.4 G/DL (ref 12–16)
IMM GRANULOCYTES # BLD AUTO: 0.03 K/UL (ref 0–0.04)
IMM GRANULOCYTES NFR BLD AUTO: 0.4 % (ref 0–0.5)
LYMPHOCYTES # BLD AUTO: 2.6 K/UL (ref 1–4.8)
LYMPHOCYTES NFR BLD: 30.4 % (ref 18–48)
MCH RBC QN AUTO: 29.9 PG (ref 27–31)
MCHC RBC AUTO-ENTMCNC: 31.5 G/DL (ref 32–36)
MCV RBC AUTO: 95 FL (ref 82–98)
MONOCYTES # BLD AUTO: 0.7 K/UL (ref 0.3–1)
MONOCYTES NFR BLD: 8.7 % (ref 4–15)
NEUTROPHILS # BLD AUTO: 5 K/UL (ref 1.8–7.7)
NEUTROPHILS NFR BLD: 59 % (ref 38–73)
NRBC BLD-RTO: 0 /100 WBC
PHOSPHATE SERPL-MCNC: 2.3 MG/DL (ref 2.7–4.5)
PLATELET # BLD AUTO: 358 K/UL (ref 150–350)
PMV BLD AUTO: 11 FL (ref 9.2–12.9)
POCT GLUCOSE: 103 MG/DL (ref 70–110)
POCT GLUCOSE: 105 MG/DL (ref 70–110)
POCT GLUCOSE: 106 MG/DL (ref 70–110)
POCT GLUCOSE: 127 MG/DL (ref 70–110)
POTASSIUM SERPL-SCNC: 4.1 MMOL/L (ref 3.5–5.1)
RBC # BLD AUTO: 3.14 M/UL (ref 4–5.4)
SODIUM SERPL-SCNC: 137 MMOL/L (ref 136–145)
VANCOMYCIN SERPL-MCNC: 20.7 UG/ML
WBC # BLD AUTO: 8.51 K/UL (ref 3.9–12.7)

## 2020-07-23 PROCEDURE — 94761 N-INVAS EAR/PLS OXIMETRY MLT: CPT

## 2020-07-23 PROCEDURE — 80202 ASSAY OF VANCOMYCIN: CPT

## 2020-07-23 PROCEDURE — 63600175 PHARM REV CODE 636 W HCPCS: Performed by: HOSPITALIST

## 2020-07-23 PROCEDURE — 21400001 HC TELEMETRY ROOM

## 2020-07-23 PROCEDURE — 99232 PR SUBSEQUENT HOSPITAL CARE,LEVL II: ICD-10-PCS | Mod: ,,, | Performed by: PODIATRIST

## 2020-07-23 PROCEDURE — 99232 SBSQ HOSP IP/OBS MODERATE 35: CPT | Mod: ,,, | Performed by: INTERNAL MEDICINE

## 2020-07-23 PROCEDURE — 84100 ASSAY OF PHOSPHORUS: CPT

## 2020-07-23 PROCEDURE — 99232 SBSQ HOSP IP/OBS MODERATE 35: CPT | Mod: ,,, | Performed by: PODIATRIST

## 2020-07-23 PROCEDURE — 99233 SBSQ HOSP IP/OBS HIGH 50: CPT | Mod: ,,, | Performed by: SURGERY

## 2020-07-23 PROCEDURE — 25000003 PHARM REV CODE 250: Performed by: HOSPITALIST

## 2020-07-23 PROCEDURE — S0073 INJECTION, AZTREONAM, 500 MG: HCPCS | Performed by: HOSPITALIST

## 2020-07-23 PROCEDURE — 80048 BASIC METABOLIC PNL TOTAL CA: CPT

## 2020-07-23 PROCEDURE — 25000003 PHARM REV CODE 250: Performed by: PHYSICIAN ASSISTANT

## 2020-07-23 PROCEDURE — 99232 PR SUBSEQUENT HOSPITAL CARE,LEVL II: ICD-10-PCS | Mod: ,,, | Performed by: INTERNAL MEDICINE

## 2020-07-23 PROCEDURE — 85025 COMPLETE CBC W/AUTO DIFF WBC: CPT

## 2020-07-23 PROCEDURE — 99233 PR SUBSEQUENT HOSPITAL CARE,LEVL III: ICD-10-PCS | Mod: ,,, | Performed by: SURGERY

## 2020-07-23 RX ORDER — SODIUM CHLORIDE 9 MG/ML
INJECTION, SOLUTION INTRAVENOUS CONTINUOUS
Status: DISCONTINUED | OUTPATIENT
Start: 2020-07-23 | End: 2020-07-24

## 2020-07-23 RX ORDER — SODIUM,POTASSIUM PHOSPHATES 280-250MG
2 POWDER IN PACKET (EA) ORAL EVERY 4 HOURS
Status: DISCONTINUED | OUTPATIENT
Start: 2020-07-23 | End: 2020-07-23

## 2020-07-23 RX ORDER — INSULIN ASPART 100 [IU]/ML
5 INJECTION, SOLUTION INTRAVENOUS; SUBCUTANEOUS
Status: DISCONTINUED | OUTPATIENT
Start: 2020-07-23 | End: 2020-07-25

## 2020-07-23 RX ORDER — ERGOCALCIFEROL 1.25 MG/1
50000 CAPSULE ORAL
Status: DISCONTINUED | OUTPATIENT
Start: 2020-07-23 | End: 2020-07-30 | Stop reason: HOSPADM

## 2020-07-23 RX ORDER — SODIUM,POTASSIUM PHOSPHATES 280-250MG
2 POWDER IN PACKET (EA) ORAL ONCE
Status: COMPLETED | OUTPATIENT
Start: 2020-07-23 | End: 2020-07-23

## 2020-07-23 RX ADMIN — INSULIN ASPART 5 UNITS: 100 INJECTION, SOLUTION INTRAVENOUS; SUBCUTANEOUS at 05:07

## 2020-07-23 RX ADMIN — AZTREONAM 1000 MG: 1 INJECTION, POWDER, LYOPHILIZED, FOR SOLUTION INTRAMUSCULAR; INTRAVENOUS at 02:07

## 2020-07-23 RX ADMIN — MORPHINE SULFATE 2 MG: 10 INJECTION INTRAVENOUS at 04:07

## 2020-07-23 RX ADMIN — SODIUM CHLORIDE: 0.9 INJECTION, SOLUTION INTRAVENOUS at 11:07

## 2020-07-23 RX ADMIN — VANCOMYCIN HYDROCHLORIDE 500 MG: 500 INJECTION, POWDER, LYOPHILIZED, FOR SOLUTION INTRAVENOUS at 09:07

## 2020-07-23 RX ADMIN — INSULIN ASPART 9 UNITS: 100 INJECTION, SOLUTION INTRAVENOUS; SUBCUTANEOUS at 09:07

## 2020-07-23 RX ADMIN — AZTREONAM 1000 MG: 1 INJECTION, POWDER, LYOPHILIZED, FOR SOLUTION INTRAMUSCULAR; INTRAVENOUS at 05:07

## 2020-07-23 RX ADMIN — HEPARIN SODIUM 5000 UNITS: 5000 INJECTION INTRAVENOUS; SUBCUTANEOUS at 06:07

## 2020-07-23 RX ADMIN — SODIUM CHLORIDE: 0.9 INJECTION, SOLUTION INTRAVENOUS at 10:07

## 2020-07-23 RX ADMIN — SODIUM CITRATE AND CITRIC ACID MONOHYDRATE 30 ML: 500; 334 SOLUTION ORAL at 09:07

## 2020-07-23 RX ADMIN — CARVEDILOL 6.25 MG: 6.25 TABLET, FILM COATED ORAL at 09:07

## 2020-07-23 RX ADMIN — PANTOPRAZOLE SODIUM 40 MG: 40 TABLET, DELAYED RELEASE ORAL at 09:07

## 2020-07-23 RX ADMIN — HEPARIN SODIUM 5000 UNITS: 5000 INJECTION INTRAVENOUS; SUBCUTANEOUS at 02:07

## 2020-07-23 RX ADMIN — MORPHINE SULFATE 2 MG: 10 INJECTION INTRAVENOUS at 09:07

## 2020-07-23 RX ADMIN — AZTREONAM 1000 MG: 1 INJECTION, POWDER, LYOPHILIZED, FOR SOLUTION INTRAMUSCULAR; INTRAVENOUS at 10:07

## 2020-07-23 RX ADMIN — MORPHINE SULFATE 2 MG: 10 INJECTION INTRAVENOUS at 07:07

## 2020-07-23 RX ADMIN — ATORVASTATIN CALCIUM 80 MG: 40 TABLET, FILM COATED ORAL at 09:07

## 2020-07-23 RX ADMIN — ALLOPURINOL 50 MG: 100 TABLET ORAL at 09:07

## 2020-07-23 RX ADMIN — GABAPENTIN 100 MG: 100 CAPSULE ORAL at 09:07

## 2020-07-23 RX ADMIN — POTASSIUM & SODIUM PHOSPHATES POWDER PACK 280-160-250 MG 2 PACKET: 280-160-250 PACK at 10:07

## 2020-07-23 RX ADMIN — GABAPENTIN 100 MG: 100 CAPSULE ORAL at 02:07

## 2020-07-23 RX ADMIN — POTASSIUM & SODIUM PHOSPHATES POWDER PACK 280-160-250 MG 2 PACKET: 280-160-250 PACK at 02:07

## 2020-07-23 RX ADMIN — INSULIN ASPART 5 UNITS: 100 INJECTION, SOLUTION INTRAVENOUS; SUBCUTANEOUS at 12:07

## 2020-07-23 RX ADMIN — CLOPIDOGREL BISULFATE 75 MG: 75 TABLET, FILM COATED ORAL at 09:07

## 2020-07-23 RX ADMIN — AMIODARONE HYDROCHLORIDE 400 MG: 200 TABLET ORAL at 09:07

## 2020-07-23 RX ADMIN — HEPARIN SODIUM 5000 UNITS: 5000 INJECTION INTRAVENOUS; SUBCUTANEOUS at 09:07

## 2020-07-23 RX ADMIN — ERGOCALCIFEROL 50000 UNITS: 1.25 CAPSULE ORAL at 02:07

## 2020-07-23 RX ADMIN — MORPHINE SULFATE 2 MG: 10 INJECTION INTRAVENOUS at 11:07

## 2020-07-23 RX ADMIN — ASPIRIN 81 MG 81 MG: 81 TABLET ORAL at 09:07

## 2020-07-23 NOTE — PROGRESS NOTES
[facility]  Nephrology  Progress Note    Patient Name: Suyapa Connelly  MRN: [id number]  Admission Date: 7/17/2020  Hospital Length of Stay: 6 days  Attending Provider: Silvestre Espana MD   Primary Care Physician: Erlinda Rahman NP  Principal Problem:Acute osteomyelitis of left calcaneus    Consults  Subjective:     Interval History: No acute overnight events. UOP 1. 6L / 24 hrs. Cr and BUN downtrending today, planning for angiogram soon. Pt doing okay this morning, reports feeling HARTMAN last night when she got up to use restroom. Otherwise, no complaints.    Review of patient's allergies indicates:   Allergen Reactions    Ciprofloxacin      Itchiness    Contrast media      Kidney injury    Pcn [penicillins]      Rash; tolerated ceftriaxone on 1/13/20     Current Facility-Administered Medications   Medication Frequency    0.9%  NaCl infusion Continuous    acetaminophen tablet 650 mg Q8H PRN    acetaminophen tablet 650 mg Q8H PRN    allopurinol split tablet 50 mg Daily    amiodarone tablet 400 mg Daily    aspirin chewable tablet 81 mg Daily    atorvastatin tablet 80 mg Daily    aztreonam 1 g in dextrose 5 % 50 mL IVPB (ready to mix system) Q8H    cadexomer iodine 0.9 % gel Daily PRN    carvediloL tablet 6.25 mg Daily    clopidogreL tablet 75 mg Daily    dextrose 50% injection 12.5 g PRN    diphenhydrAMINE capsule 25 mg Q6H PRN    gabapentin capsule 100 mg TID    glucagon (human recombinant) injection 1 mg PRN    heparin (porcine) injection 5,000 Units Q8H    insulin aspart U-100 pen 0-5 Units Q6H PRN    insulin aspart U-100 pen 5 Units TID WM    insulin detemir U-100 pen 10 Units Daily    melatonin tablet 6 mg Nightly PRN    morphine injection 2 mg Q4H PRN    ondansetron injection 8 mg Q8H PRN    pantoprazole EC tablet 40 mg Daily    polyethylene glycol packet 17 g BID PRN    sodium chloride 0.9% flush 10 mL PRN    sodium citrate-citric acid 500-334 mg/5 ml solution 30  mL BID    vancomycin - pharmacy to dose pharmacy to manage frequency    vancomycin 500 mg in dextrose 5 % 100 mL IVPB (ready to mix system) Once       Objective:     Vital Signs (Most Recent):  Temp: 98.1 °F (36.7 °C) (07/23/20 0805)  Pulse: 94 (07/23/20 0805)  Resp: 18 (07/23/20 0805)  BP: (!) 150/85 (07/23/20 0805)  SpO2: 100 % (07/23/20 0805)  O2 Device (Oxygen Therapy): room air (07/22/20 0708) Vital Signs (24h Range):  Temp:  [96.2 °F (35.7 °C)-98.1 °F (36.7 °C)] 98.1 °F (36.7 °C)  Pulse:  [81-94] 94  Resp:  [16-18] 18  SpO2:  [98 %-100 %] 100 %  BP: (121-150)/(62-85) 150/85     Weight: 87.5 kg (192 lb 14.4 oz) (07/20/20 0418)  Body mass index is 32.1 kg/m².  Body surface area is 2 meters squared.    I/O last 3 completed shifts:  In: 240 [P.O.:240]  Out: 2325 [Urine:2325]    Physical Exam  Constitutional:       General: She is not in acute distress.     Appearance: She is well-developed. She is not diaphoretic.   HENT:      Head: Normocephalic and atraumatic.      Mouth/Throat:      Mouth: Mucous membranes are moist.   Eyes:      Conjunctiva/sclera: Conjunctivae normal.   Cardiovascular:      Rate and Rhythm: Normal rate and regular rhythm.      Heart sounds: No murmur.   Pulmonary:      Effort: Pulmonary effort is normal.      Breath sounds: Rales (LLL) present.   Abdominal:      General: There is no distension.      Palpations: Abdomen is soft.   Musculoskeletal:         General: Swelling (mild BLE) present. No deformity.   Skin:     General: Skin is warm and dry.      Findings: No rash.   Neurological:      Mental Status: She is alert and oriented to person, place, and time.   Psychiatric:         Behavior: Behavior normal.         Significant Labs:sure  CBC:   Recent Labs   Lab 07/23/20 0419   WBC 8.51   RBC 3.14*   HGB 9.4*   HCT 29.8*   *   MCV 95   MCH 29.9   MCHC 31.5*     CMP:   Recent Labs   Lab 07/17/20  1631  07/22/20  0453 07/23/20  0419   *   < > 408* 133*   CALCIUM 9.5   < > 8.1*  8.3*   ALBUMIN 3.0*   < > 2.1*  --    PROT 8.5*  --   --   --    *   < > 130* 137   K 4.3   < > 4.7 4.1   CO2 18*   < > 19* 23      < > 101 106   BUN 38*   < > 50* 43*   CREATININE 2.5*   < > 2.0* 1.5*   ALKPHOS 115  --   --   --    ALT <5*  --   --   --    AST 11  --   --   --    BILITOT 0.5  --   --   --     < > = values in this interval not displayed.     PTH:   Recent Labs   Lab 07/22/20  0453   PTH 77.8*     Recent Labs   Lab 07/22/20  0037   COLORU Straw   SPECGRAV 1.015   PHUR 5.0   PROTEINUA 2+*   BACTERIA None   NITRITE Negative   LEUKOCYTESUR Negative   UROBILINOGEN Negative   HYALINECASTS 0     All labs within the past 24 hours have been reviewed.    Significant Imaging:  Labs: Reviewed    Assessment/Plan:   ROSLYN on CKD 3  - Has hx of HD dependant ROSLYN (LEVI) requiring RRT from 1/16/20 -3/10/20  - Recent baseline Cr unclear but based on available values, appears to be 1.4 -1.9 with GFR 30-43; though consideration for possible progression of CKD; suspect underlying diabetic nephropathy Hgb A 1c > 14  - OA 7/17, Cr 2.5 and BUN 38. Cr has continued to gradually trend down   - Etiology unclear at this time; possibly d/t hyperglycemia? Glucose 483 on admit. - Renal US 7/17 unremarkable, UA c/w ROSLYN  - Renal function improved today, Cr and BUN downtrending; good UOP   - UA with 2+ protein 1+ blood, Akosua 21 and UPrCr 2.5 g/g  - ok to proceed with angiogram at this time   - caution with IVF in advanced CKD patients - monitor O2 closely  - renally dose meds for current GFR/ avoid nephrotoxic meds- adjusted gabapentin  - strict Is/ Os/ daily weights     Hypervolemia   - mild; some crackles to LL today, mild peripheral edema, and intermittent SOB  - on RA; good UOP  - monitor O2 closely while on IVF  - previously on diuretics at home - monitor for need to dose gentle PO lasix   - CXR without evidence pulm edema    Hyperkalemia  - K trending down with medical management  - renal diet  - f/u AM  labs     Metabolic Acidosis   - likely d/t ROSLYN + CKD; exacerbated by NS  - initial ABG c/w primary met acidosis   - continue Bicitra 30 ml PO BID, titrate PRN  - AM labs     CKD MBD  - CCa wnl; PTH 78 and Vit D 7  - will start Vit D supplement  - Phos 2.3 - phosnak 2 packets x2 doses    Proteinuria   - UA 2+ protein UPrCr 2.5 g/g   - would benefit fron ACE/ ARB once ROSLYN resolves  Hyperuricemia   - uric acid 8.6  - continue allopurinol 50 mg PO QD  - will trend     DOS: 07/23/2020    VENANCIO Pineda  Nephrology  [facility] [place]

## 2020-07-23 NOTE — PROGRESS NOTES
[facility] Hospital Medicine  Progress Note    Patient Name: Suyapa Connelly  MRN: [id number]  Patient Class: IP- Inpatient   Admission Date: 7/17/2020  Length of Stay: 6 days  Attending Physician: Silvestre Espana MD  Primary Care Provider: Erlinda Rahman NP        Subjective:     Principal Problem:Acute osteomyelitis of left calcaneus        HPI:  Suyapa Connelly is a 54 y.o. female that (in part)  has a past medical history of Anxiety, Depression, Diabetes mellitus, GERD (gastroesophageal reflux disease), Hyperlipemia, Hypertension, and Myocardial infarction.  has a past surgical history that includes Angiogram - Right Extremity (Right, 7/9/15); angiogram-left leg (10/6/15); Catheterization of both left and right heart (N/A, 12/18/2019); Coronary angiography (N/A, 12/18/2019); Coronary Artery Bypass Graft (CABG) (N/A, 1/14/2020); and Insertion of tunneled central venous hemodialysis catheter (N/A, 1/27/2020). Presents to [facility] Emergency Department complaining of the left heel foot wound.  This is been there for several weeks with progressive worsening.  Poor healing.  Patient became concerned due to malodorous drainage.  Pain with ambulation and weight-bearing.  Associated hyperglycemia.  Denies fever chills.    In the emergency department physical exam concerning for osteomyelitis secondary to diabetic foot ulcer in the setting of uncontrolled diabetes.  Routine laboratory studies revealed hyperglycemia.  X-ray of the foot performed.  Concerning for osteomyelitis.  MRI confirmed this.  Podiatry was consulted.  Broad-spectrum antibiotics were initiated in the ED a special consideration given to risk of Pseudomonas infection.    Hospital medicine has been asked to admit to inpatient for further evaluation and treatment.     Overview/Hospital Course:  Suyapa Connelly is a 54 y.o. female that (in part)  has a past medical history of Anxiety, Depression, Diabetes  mellitus, GERD (gastroesophageal reflux disease), Hyperlipemia, Hypertension, and Myocardial infarction.  has a past surgical history that includes Angiogram - Right Extremity (Right, 7/9/15); angiogram-left leg (10/6/15); Catheterization of both left and right heart (N/A, 12/18/2019); Coronary angiography (N/A, 12/18/2019); Coronary Artery Bypass Graft (CABG) (N/A, 1/14/2020); and Insertion of tunneled central venous hemodialysis catheter (N/A, 1/27/2020). Presents to [facility] Emergency Department complaining of the left heel foot wound.  MRI confirmed osteo. Podiatry, ID, and vascular were consulted.  Podiatry discussed options with patient. She has chosen 6 weeks of IV abx. ID has been consulted to tailor abx. She has been continued on Aztreonam.  Blood cultures were NG. Bone bx on 7/18. Vascular Surgery consulted and planning angiogram.  Patient with renal failure and Nephrology consulted.  Renal function continues to improve and ok to proceed with vascular intervention.  Podiatry planning debridement post vascular procedure.    Interval History: Feeling well.    Review of Systems   HENT: Negative for ear discharge and ear pain.    Eyes: Negative for discharge and itching.   Endocrine: Negative for cold intolerance and heat intolerance.   Neurological: Negative for seizures and syncope.     Objective:     Vital Signs (Most Recent):  Temp: 98.1 °F (36.7 °C) (07/23/20 0805)  Pulse: 94 (07/23/20 0805)  Resp: 18 (07/23/20 0805)  BP: (!) 150/85 (07/23/20 0805)  SpO2: 100 % (07/23/20 0805) Vital Signs (24h Range):  Temp:  [96.2 °F (35.7 °C)-98.1 °F (36.7 °C)] 98.1 °F (36.7 °C)  Pulse:  [81-94] 94  Resp:  [16-18] 18  SpO2:  [98 %-100 %] 100 %  BP: (121-150)/(62-85) 150/85     Weight: 87.5 kg (192 lb 14.4 oz)  Body mass index is 32.1 kg/m².    Intake/Output Summary (Last 24 hours) at 7/23/2020 0913  Last data filed at 7/23/2020 0600  Gross per 24 hour   Intake --   Output 1625 ml   Net -1625 ml       Physical Exam  Vitals signs and nursing note reviewed.   Constitutional:       General: She is not in acute distress.     Appearance: She is well-developed. She is not diaphoretic.   HENT:      Head: Normocephalic and atraumatic.      Right Ear: Hearing and external ear normal.      Left Ear: Hearing and external ear normal.      Nose: Nose normal.      Mouth/Throat:      Mouth: Mucous membranes are moist.      Pharynx: Oropharynx is clear. Uvula midline. No oropharyngeal exudate.   Eyes:      General: No scleral icterus.        Right eye: No discharge.         Left eye: No discharge.   Neck:      Musculoskeletal: Normal range of motion and neck supple.      Thyroid: No thyromegaly.      Trachea: No tracheal deviation.   Cardiovascular:      Rate and Rhythm: Normal rate and regular rhythm.      Heart sounds: Normal heart sounds. No murmur. No friction rub. No gallop.    Pulmonary:      Effort: Pulmonary effort is normal. No respiratory distress.      Breath sounds: Normal breath sounds. No stridor. No wheezing or rales.   Chest:      Chest wall: No tenderness.   Abdominal:      General: Bowel sounds are normal. There is no distension.      Palpations: Abdomen is soft.      Tenderness: There is no abdominal tenderness. There is no guarding or rebound.   Musculoskeletal: Normal range of motion.         General: No tenderness.      Comments: Left foot wrapped with gauze dressing.    Lymphadenopathy:      Cervical: No cervical adenopathy.   Skin:     General: Skin is warm and dry.      Coloration: Skin is not pale.      Findings: No erythema or rash.   Neurological:      Mental Status: She is alert and oriented to person, place, and time.      Cranial Nerves: No cranial nerve deficit.      Coordination: Coordination normal.   Psychiatric:         Behavior: Behavior normal.         Thought Content: Thought content normal.         Judgment: Judgment normal.         Significant Labs:   BMP:   Recent Labs   Lab  07/23/20  0419   *      K 4.1      CO2 23   BUN 43*   CREATININE 1.5*   CALCIUM 8.3*     CBC:   Recent Labs   Lab 07/22/20  0453 07/23/20 0419   WBC 15.74* 8.51   HGB 8.8* 9.4*   HCT 26.9* 29.8*    358*       Significant Imaging: I have reviewed all pertinent imaging results/findings within the past 24 hours.      Assessment/Plan:      * Acute osteomyelitis of left calcaneus  MRI confirms osteo. Bone Bx- 7/18  Appreciate consultants input.  Patient refusing amputation and wants to try IV ABx's. On Aztreonam.  ID consulted for ABx recommendations.  Vascular Surgery consulted and planning angiogram.  CKD and Nephrology consulted.  Vascular discussed case with Nephrology and will hold off on Angio until renal function optimized.  Creat continues to improve.  Ok to proceed with vascular intervention.  Podiatry planning debridement post vascular procedure.  Increase in WBC related to steroids.  Resolved today.        Hyponatremia        Infection due to enterococcus        Stage 3 chronic kidney disease  Appreciate Nephrology input.  Creat improving since admission.  Hyperkalemia on 7/21.  Treated with Calcium, Albuterol and Kayexalate.  Resolved         Non healing left heel wound  As above.      S/P CABG x 1  No acute issues       Mixed hyperlipidemia  Stable      Chronic combined systolic and diastolic heart failure  No acute issues   EF 50% on recent echo   Gently hydrate prior to angiogram.      Essential hypertension  Continue current management.      Type 2 diabetes mellitus without complication, with long-term current use of insulin  Uncontrolled with hyperglycemia. Manifestations likely of CKD 3    Will check an A1c- > 14.  Glucose increased secondary to steroids.  Added insulin with meals.  Improving         Peripheral artery disease  Angiogram as above.        VTE Risk Mitigation (From admission, onward)         Ordered     heparin (porcine) injection 5,000 Units  Every 8 hours       07/17/20 2217     IP VTE HIGH RISK PATIENT  Once      07/17/20 2217     Place sequential compression device  Until discontinued      07/17/20 2217     Place HAILEY hose  Until discontinued      07/17/20 2217                      Silvestre Espana MD  Department of Hospital Medicine   [facility]

## 2020-07-23 NOTE — ASSESSMENT & PLAN NOTE
MRI confirms osteo. Bone Bx- 7/18  Appreciate consultants input.  Patient refusing amputation and wants to try IV ABx's. On Aztreonam.  ID consulted for ABx recommendations.  Vascular Surgery consulted and planning angiogram.  CKD and Nephrology consulted.  Vascular discussed case with Nephrology and will hold off on Angio until renal function optimized.  Creat continues to improve.  Ok to proceed with vascular intervention.  Podiatry planning debridement post vascular procedure.  Increase in WBC related to steroids.  Resolved today.

## 2020-07-23 NOTE — SUBJECTIVE & OBJECTIVE
Interval History: Feeling well.    Review of Systems   HENT: Negative for ear discharge and ear pain.    Eyes: Negative for discharge and itching.   Endocrine: Negative for cold intolerance and heat intolerance.   Neurological: Negative for seizures and syncope.     Objective:     Vital Signs (Most Recent):  Temp: 98.1 °F (36.7 °C) (07/23/20 0805)  Pulse: 94 (07/23/20 0805)  Resp: 18 (07/23/20 0805)  BP: (!) 150/85 (07/23/20 0805)  SpO2: 100 % (07/23/20 0805) Vital Signs (24h Range):  Temp:  [96.2 °F (35.7 °C)-98.1 °F (36.7 °C)] 98.1 °F (36.7 °C)  Pulse:  [81-94] 94  Resp:  [16-18] 18  SpO2:  [98 %-100 %] 100 %  BP: (121-150)/(62-85) 150/85     Weight: 87.5 kg (192 lb 14.4 oz)  Body mass index is 32.1 kg/m².    Intake/Output Summary (Last 24 hours) at 7/23/2020 0913  Last data filed at 7/23/2020 0600  Gross per 24 hour   Intake --   Output 1625 ml   Net -1625 ml      Physical Exam  Vitals signs and nursing note reviewed.   Constitutional:       General: She is not in acute distress.     Appearance: She is well-developed. She is not diaphoretic.   HENT:      Head: Normocephalic and atraumatic.      Right Ear: Hearing and external ear normal.      Left Ear: Hearing and external ear normal.      Nose: Nose normal.      Mouth/Throat:      Mouth: Mucous membranes are moist.      Pharynx: Oropharynx is clear. Uvula midline. No oropharyngeal exudate.   Eyes:      General: No scleral icterus.        Right eye: No discharge.         Left eye: No discharge.   Neck:      Musculoskeletal: Normal range of motion and neck supple.      Thyroid: No thyromegaly.      Trachea: No tracheal deviation.   Cardiovascular:      Rate and Rhythm: Normal rate and regular rhythm.      Heart sounds: Normal heart sounds. No murmur. No friction rub. No gallop.    Pulmonary:      Effort: Pulmonary effort is normal. No respiratory distress.      Breath sounds: Normal breath sounds. No stridor. No wheezing or rales.   Chest:      Chest wall: No  tenderness.   Abdominal:      General: Bowel sounds are normal. There is no distension.      Palpations: Abdomen is soft.      Tenderness: There is no abdominal tenderness. There is no guarding or rebound.   Musculoskeletal: Normal range of motion.         General: No tenderness.      Comments: Left foot wrapped with gauze dressing.    Lymphadenopathy:      Cervical: No cervical adenopathy.   Skin:     General: Skin is warm and dry.      Coloration: Skin is not pale.      Findings: No erythema or rash.   Neurological:      Mental Status: She is alert and oriented to person, place, and time.      Cranial Nerves: No cranial nerve deficit.      Coordination: Coordination normal.   Psychiatric:         Behavior: Behavior normal.         Thought Content: Thought content normal.         Judgment: Judgment normal.         Significant Labs:   BMP:   Recent Labs   Lab 07/23/20  0419   *      K 4.1      CO2 23   BUN 43*   CREATININE 1.5*   CALCIUM 8.3*     CBC:   Recent Labs   Lab 07/22/20  0453 07/23/20  0419   WBC 15.74* 8.51   HGB 8.8* 9.4*   HCT 26.9* 29.8*    358*       Significant Imaging: I have reviewed all pertinent imaging results/findings within the past 24 hours.

## 2020-07-23 NOTE — SUBJECTIVE & OBJECTIVE
"Interval History: "OK. My sugar is better ". 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 Wound stable. Unable to undergo angiogram due to creatinine level. Pending angiogram, possible revascularization, and debridement of infected bone tissue. Still awaiting vascular intervention.         Review of Systems   Constitutional: Negative for chills, fatigue, fever and unexpected weight change.   HENT: Negative for ear pain, facial swelling, hearing loss, mouth sores, nosebleeds, rhinorrhea, sinus pressure, sore throat, tinnitus, trouble swallowing and voice change.    Eyes: Negative for photophobia, pain, redness and visual disturbance.   Respiratory: Negative for cough, chest tightness, shortness of breath and wheezing.    Cardiovascular: Negative for chest pain, palpitations and leg swelling.   Gastrointestinal: Negative for abdominal pain, blood in stool, constipation, diarrhea, nausea and vomiting.   Endocrine: Negative for cold intolerance, heat intolerance, polydipsia, polyphagia and polyuria.   Genitourinary: Negative for decreased urine volume, dysuria, flank pain, frequency, hematuria, menstrual problem, urgency, vaginal bleeding, vaginal discharge and vaginal pain.   Musculoskeletal: Negative for arthralgias, back pain, joint swelling, myalgias and neck pain.   Skin: Positive for wound. Negative for rash.   Allergic/Immunologic: Negative for environmental allergies, food allergies and immunocompromised state.   Neurological: Negative for dizziness, seizures, syncope, weakness, light-headedness, numbness and headaches.   Hematological: Negative for adenopathy. Does not bruise/bleed easily.   Psychiatric/Behavioral: Negative for confusion, hallucinations, self-injury, sleep disturbance and suicidal ideas. The patient is not nervous/anxious.      Objective:     Vital Signs (Most Recent):  Temp: 97.6 °F (36.4 °C) (07/23/20 0618)  Pulse: 93 (07/23/20 0618)  Resp: 18 (07/23/20 " 0618)  BP: 128/72 (07/23/20 0618)  SpO2: 98 % (07/23/20 0618) Vital Signs (24h Range):  Temp:  [96.2 °F (35.7 °C)-98.1 °F (36.7 °C)] 97.6 °F (36.4 °C)  Pulse:  [] 93  Resp:  [16-18] 18  SpO2:  [97 %-100 %] 98 %  BP: (121-175)/(62-83) 128/72     Weight: 87.5 kg (192 lb 14.4 oz)  Body mass index is 32.1 kg/m².    Estimated Creatinine Clearance: 35.5 mL/min (A) (based on SCr of 2 mg/dL (H)).    Physical Exam  Vitals signs and nursing note reviewed.   Constitutional:       General: She is not in acute distress.     Appearance: She is well-developed. She is not diaphoretic.   HENT:      Head: Normocephalic and atraumatic.      Right Ear: Hearing and external ear normal.      Left Ear: Hearing and external ear normal.      Nose: Nose normal.      Mouth/Throat:      Mouth: Mucous membranes are moist.      Pharynx: Oropharynx is clear. Uvula midline. No oropharyngeal exudate.   Eyes:      General: No scleral icterus.        Right eye: No discharge.         Left eye: No discharge.   Neck:      Musculoskeletal: Normal range of motion and neck supple.      Thyroid: No thyromegaly.      Trachea: No tracheal deviation.   Cardiovascular:      Rate and Rhythm: Normal rate and regular rhythm.      Heart sounds: Normal heart sounds. No murmur. No friction rub. No gallop.    Pulmonary:      Effort: Pulmonary effort is normal. No respiratory distress.      Breath sounds: Normal breath sounds. No stridor. No wheezing or rales.   Chest:      Chest wall: No tenderness.   Abdominal:      General: Bowel sounds are normal. There is no distension.      Palpations: Abdomen is soft.      Tenderness: There is no abdominal tenderness. There is no guarding or rebound.   Musculoskeletal: Normal range of motion.         General: No tenderness.      Comments: Left foot wrapped with gauze dressing.    Lymphadenopathy:      Cervical: No cervical adenopathy.   Skin:     General: Skin is warm and dry.      Coloration: Skin is not pale.       Findings: No erythema or rash.   Neurological:      Mental Status: She is alert and oriented to person, place, and time.      Cranial Nerves: No cranial nerve deficit.      Coordination: Coordination normal.   Psychiatric:         Behavior: Behavior normal.         Thought Content: Thought content normal.         Judgment: Judgment normal.         Significant Labs:   Blood Culture:   Recent Labs   Lab 07/17/20  1631 07/17/20  1712   LABBLOO No growth after 5 days. No growth after 5 days.     BMP:   Recent Labs   Lab 07/22/20  0453   *   *   K 4.7      CO2 19*   BUN 50*   CREATININE 2.0*   CALCIUM 8.1*     CBC:   Recent Labs   Lab 07/22/20  0453 07/23/20  0419   WBC 15.74* 8.51   HGB 8.8* 9.4*   HCT 26.9* 29.8*    358*     Wound Culture:   Recent Labs   Lab 07/18/20  0929   LABAERO ENTEROCOCCUS FAECALIS  Moderate  *  STREPTOCOCCUS AGALACTIAE (GROUP B)  Few  Beta-hemolytic streptococci are routinely susceptible to   penicillins,cephalosporins and carbapenems.  *  ENTEROBACTER CLOACAE  Rare  *       Significant Imaging: I have reviewed all pertinent imaging results/findings within the past 24 hours.

## 2020-07-23 NOTE — ASSESSMENT & PLAN NOTE
Appreciate Nephrology input.  Creat improving since admission.  Hyperkalemia on 7/21.  Treated with Calcium, Albuterol and Kayexalate.  Resolved

## 2020-07-23 NOTE — PROGRESS NOTES
[facility]  Infectious Disease  Progress Note    Patient Name: Suyapa Connelly  MRN: [id number]  Admission Date: 7/17/2020  Length of Stay: 6 days  Attending Physician: Silvestre Espana MD  Primary Care Provider: Erlinda Rahman NP    Isolation Status: No active isolations  Assessment/Plan:      * Acute osteomyelitis of left calcaneus  Osteomyelitis of the calcaneous presumed to be acute however pending pathology for confirmation. Cultures now with Enterococcus, E cloacae, and GBS. Afebrile and with resolved leukocytosis which was likely a leukemoid reaction to steroids.   · Patient has tolerated cephalosporins in past. Recommend switching aztreonam to cefepime 2 gm IV every 12 hours as it is unclear when her surgical intervention will occur.    · Continue vancomycin.   · Patient likely to require IV antibiotics. Please discuss PICC vs subclavian catheter for IV antibiotics with nephrology.       Infection due to enterococcus  See above      Gram-negative infection  See above        Anticipated Disposition: per primary    Thank you for your consult. I will follow-up with patient. Please contact us if you have any additional questions.    Verenice Myles MD  Infectious Disease  [facility]    Subjective:     Principal Problem:Acute osteomyelitis of left calcaneus    HPI: A 54-year-old woman with CAD, HF-EF 35%, PAD, s/p bilateral lower extremity stents, DM 2,  Anxiety, depression, and chronic open wound of the left heel who developed malodorous drainage from her wound. This was associated with pain and hyperglycemia. She was evaluated on  ED where she was afebrile and without leukocytosis. Further work up revealed elevated creatinine level, an ESR of 139, and CRP of 9.2. MRI showed evidence of osteomyelitis of the calcaneous. She was evaluated by Podiatry and subsequently underwent bone biopsy for pathology and culture. Cultures with Enterococcus pending identification and  "sensitivities.     Mrs. Connelly has allergy to penicillin, Bactrim, and clindamycin. She has a pet dog. She smoke marijuana occasionally. She enjoys fishing.     Infectious Diseases consulted for management recommendations.     Interval History: "OK. My sugar is better ". 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 Wound stable. Unable to undergo angiogram due to creatinine level. Pending angiogram, possible revascularization, and debridement of infected bone tissue. Still awaiting vascular intervention.         Review of Systems   Constitutional: Negative for chills, fatigue, fever and unexpected weight change.   HENT: Negative for ear pain, facial swelling, hearing loss, mouth sores, nosebleeds, rhinorrhea, sinus pressure, sore throat, tinnitus, trouble swallowing and voice change.    Eyes: Negative for photophobia, pain, redness and visual disturbance.   Respiratory: Negative for cough, chest tightness, shortness of breath and wheezing.    Cardiovascular: Negative for chest pain, palpitations and leg swelling.   Gastrointestinal: Negative for abdominal pain, blood in stool, constipation, diarrhea, nausea and vomiting.   Endocrine: Negative for cold intolerance, heat intolerance, polydipsia, polyphagia and polyuria.   Genitourinary: Negative for decreased urine volume, dysuria, flank pain, frequency, hematuria, menstrual problem, urgency, vaginal bleeding, vaginal discharge and vaginal pain.   Musculoskeletal: Negative for arthralgias, back pain, joint swelling, myalgias and neck pain.   Skin: Positive for wound. Negative for rash.   Allergic/Immunologic: Negative for environmental allergies, food allergies and immunocompromised state.   Neurological: Negative for dizziness, seizures, syncope, weakness, light-headedness, numbness and headaches.   Hematological: Negative for adenopathy. Does not bruise/bleed easily.   Psychiatric/Behavioral: Negative for confusion, " hallucinations, self-injury, sleep disturbance and suicidal ideas. The patient is not nervous/anxious.      Objective:     Vital Signs (Most Recent):  Temp: 97.6 °F (36.4 °C) (07/23/20 0618)  Pulse: 93 (07/23/20 0618)  Resp: 18 (07/23/20 0618)  BP: 128/72 (07/23/20 0618)  SpO2: 98 % (07/23/20 0618) Vital Signs (24h Range):  Temp:  [96.2 °F (35.7 °C)-98.1 °F (36.7 °C)] 97.6 °F (36.4 °C)  Pulse:  [] 93  Resp:  [16-18] 18  SpO2:  [97 %-100 %] 98 %  BP: (121-175)/(62-83) 128/72     Weight: 87.5 kg (192 lb 14.4 oz)  Body mass index is 32.1 kg/m².    Estimated Creatinine Clearance: 35.5 mL/min (A) (based on SCr of 2 mg/dL (H)).    Physical Exam  Vitals signs and nursing note reviewed.   Constitutional:       General: She is not in acute distress.     Appearance: She is well-developed. She is not diaphoretic.   HENT:      Head: Normocephalic and atraumatic.      Right Ear: Hearing and external ear normal.      Left Ear: Hearing and external ear normal.      Nose: Nose normal.      Mouth/Throat:      Mouth: Mucous membranes are moist.      Pharynx: Oropharynx is clear. Uvula midline. No oropharyngeal exudate.   Eyes:      General: No scleral icterus.        Right eye: No discharge.         Left eye: No discharge.   Neck:      Musculoskeletal: Normal range of motion and neck supple.      Thyroid: No thyromegaly.      Trachea: No tracheal deviation.   Cardiovascular:      Rate and Rhythm: Normal rate and regular rhythm.      Heart sounds: Normal heart sounds. No murmur. No friction rub. No gallop.    Pulmonary:      Effort: Pulmonary effort is normal. No respiratory distress.      Breath sounds: Normal breath sounds. No stridor. No wheezing or rales.   Chest:      Chest wall: No tenderness.   Abdominal:      General: Bowel sounds are normal. There is no distension.      Palpations: Abdomen is soft.      Tenderness: There is no abdominal tenderness. There is no guarding or rebound.   Musculoskeletal: Normal range of  motion.         General: No tenderness.      Comments: Left foot wrapped with gauze dressing.    Lymphadenopathy:      Cervical: No cervical adenopathy.   Skin:     General: Skin is warm and dry.      Coloration: Skin is not pale.      Findings: No erythema or rash.   Neurological:      Mental Status: She is alert and oriented to person, place, and time.      Cranial Nerves: No cranial nerve deficit.      Coordination: Coordination normal.   Psychiatric:         Behavior: Behavior normal.         Thought Content: Thought content normal.         Judgment: Judgment normal.         Significant Labs:   Blood Culture:   Recent Labs   Lab 07/17/20  1631 07/17/20  1712   LABBLOO No growth after 5 days. No growth after 5 days.     BMP:   Recent Labs   Lab 07/22/20  0453   *   *   K 4.7      CO2 19*   BUN 50*   CREATININE 2.0*   CALCIUM 8.1*     CBC:   Recent Labs   Lab 07/22/20  0453 07/23/20  0419   WBC 15.74* 8.51   HGB 8.8* 9.4*   HCT 26.9* 29.8*    358*     Wound Culture:   Recent Labs   Lab 07/18/20  0929   LABAERO ENTEROCOCCUS FAECALIS  Moderate  *  STREPTOCOCCUS AGALACTIAE (GROUP B)  Few  Beta-hemolytic streptococci are routinely susceptible to   penicillins,cephalosporins and carbapenems.  *  ENTEROBACTER CLOACAE  Rare  *       Significant Imaging: I have reviewed all pertinent imaging results/findings within the past 24 hours.

## 2020-07-23 NOTE — PROGRESS NOTES
Pharmacokinetic Assessment Follow Up: IV Vancomycin    Vancomycin serum concentration assessment(s):    The random level was drawn correctly and can be used to guide therapy at this time. The measurement is within the desired definitive target range of 10 to 20 mcg/mL.    Vancomycin Regimen Plan:    Give 500 mg today.  Re-dose when the random level is less than 20 mcg/mL, next level to be drawn at 0400 on 7/24/2020    Drug levels (last 3 results):  Recent Labs   Lab Result Units 07/21/20  0610 07/22/20  0453 07/23/20  0419   Vancomycin, Random ug/mL 16.5 18.9 20.7       Pharmacy will continue to follow and monitor vancomycin.    Please contact pharmacy at extension [phone] for questions regarding this assessment.    Thank you for the consult,   Abdirashid Canela Jr       Patient brief summary:  Suyapa Connelly is a 54 y.o. female initiated on antimicrobial therapy with IV Vancomycin for treatment of bone/joint infection    Drug Allergies:   Review of patient's allergies indicates:   Allergen Reactions    Ciprofloxacin      Itchiness    Contrast media      Kidney injury    Pcn [penicillins]      Rash; tolerated ceftriaxone on 1/13/20       Actual Body Weight:   87.5 kg    Renal Function:   Estimated Creatinine Clearance: 35.5 mL/min (A) (based on SCr of 2 mg/dL (H)).,     Dialysis Method (if applicable):  N/A    CBC (last 72 hours):  Recent Labs   Lab Result Units 07/22/20  0453 07/23/20  0419   WBC K/uL 15.74* 8.51   Hemoglobin g/dL 8.8* 9.4*   Hematocrit % 26.9* 29.8*   Platelets K/uL 325 358*   Gran% % 85.7* 59.0   Lymph% % 6.0* 30.4   Mono% % 7.6 8.7   Eosinophil% % 0.0 1.1   Basophil% % 0.1 0.4   Differential Method  Automated Automated       Metabolic Panel (last 72 hours):  Recent Labs   Lab Result Units 07/21/20  0610 07/21/20  0925 07/21/20  1151 07/22/20  0037 07/22/20  0047 07/22/20  0453   Sodium mmol/L 132* 133*  --   --   --  130*   Sodium Urine Random mmol/L  --   --   --   --  21  --    Potassium mmol/L  5.8* 5.7* 5.4*  --   --  4.7   Chloride mmol/L 107 106  --   --   --  101   CO2 mmol/L 16* 16*  --   --   --  19*   Glucose mg/dL 310* 333*  --   --   --  408*   Glucose, UA   --   --   --  3+*  --   --    BUN, Bld mg/dL 47* 47*  --   --   --  50*   Creatinine mg/dL 1.9* 1.9*  --   --   --  2.0*   Creatinine, Random Ur mg/dL  --   --   --   --  56.7  56.7  --    Albumin g/dL  --  2.3*  --   --   --  2.1*   Phosphorus mg/dL  --  4.0  --   --   --  3.2       Vancomycin Administrations:  vancomycin given in the last 96 hours                     vancomycin 750 mg in dextrose 5 % 250 mL IVPB (ready to mix system) (mg) 750 mg New Bag 07/22/20 1245    vancomycin 750 mg in dextrose 5 % 250 mL IVPB (ready to mix system) (mg) 750 mg New Bag 07/21/20 1128    vancomycin 500 mg in dextrose 5 % 100 mL IVPB (ready to mix system) (mg) 500 mg New Bag 07/20/20 1228                    Microbiologic Results:  Microbiology Results (last 7 days)       Procedure Component Value Units Date/Time    Blood Culture #2 **CANNOT BE ORDERED STAT** [[id number]] Collected: 07/17/20 1712    Order Status: Completed Specimen: Blood from Peripheral, Hand, Left Updated: 07/22/20 1903     Blood Culture, Routine No growth after 5 days.    Blood Culture #1 **CANNOT BE ORDERED STAT** [[id number]] Collected: 07/17/20 1631    Order Status: Completed Specimen: Blood from Peripheral, Antecubital, Right Updated: 07/22/20 1903     Blood Culture, Routine No growth after 5 days.    Aerobic culture [[id number]]  (Abnormal)  (Susceptibility) Collected: 07/18/20 0929    Order Status: Completed Specimen: Bone from Foot, Left Updated: 07/21/20 0745     Aerobic Bacterial Culture ENTEROCOCCUS FAECALIS  Moderate        STREPTOCOCCUS AGALACTIAE (GROUP B)  Few  Beta-hemolytic streptococci are routinely susceptible to   penicillins,cephalosporins and carbapenems.        ENTEROBACTER CLOACAE  Rare      Gram stain [[id number]] Collected: 07/18/20 0929    Order Status: Completed  Specimen: Bone from Foot, Left Updated: 07/19/20 0346     Gram Stain Result No WBC's      No organisms seen

## 2020-07-23 NOTE — PROGRESS NOTES
[facility]  Podiatry  Progress Note    Patient Name: Suyapa Connelly  MRN: [id number]  Admission Date: 7/17/2020  Hospital Length of Stay: 6 days  Attending Physician: Silvestre Espana MD  Primary Care Provider: Erlinda Rahman NP     Subjective:     History of Present Illness: 55 y/o male PMH DM2, PAD, CABG admitted for left heel infection. Reports wound started as a blister in Jan. 2020 after she was in a coma for 11 days following CABG procedure. Reports she was then discharged to rehab. Reports applying neosporin to left heel ulceration however the wound has progressively worsened in the past week. Reports she did not seek medical attention for the left heel wound prior due to COVID concerns as she was taking care of her elderly parents. Reports nausea today however reports this has been present for several weeks was taking zofran for this. Denies fevers, chills.      07/21/2020 patient seen at bedside.  She is resting comfortably.  She relates pain well controlled with current narcotic regimen.  She relates she is disappointed with finding that she has decreased blood flow to the foot.  No new pedal complaints.   She denies nausea, vomiting, fever, chills    7/22/20: Patient seen bedside. Heel protector boots in place. Pending angio per vascular surgery. Patient reports taking prednisone yesterday 2/2 allergic reaction to Cipro.     07/23/2020:  Patient seen bedside. Heel protector boots in place. Pending angio per vascular surgery. She relates sharp pains and cramping intermittently, pain well controlled with current regimen.       Scheduled Meds:   allopurinoL  50 mg Oral Daily    amiodarone  400 mg Oral Daily    aspirin  81 mg Oral Daily    atorvastatin  80 mg Oral Daily    aztreonam  1,000 mg Intravenous Q8H    carvediloL  6.25 mg Oral Daily    clopidogreL  75 mg Oral Daily    gabapentin  100 mg Oral TID    heparin (porcine)  5,000 Units Subcutaneous Q8H    insulin aspart U-100   5 Units Subcutaneous TID WM    insulin detemir U-100  10 Units Subcutaneous Daily    pantoprazole  40 mg Oral Daily    potassium, sodium phosphates  2 packet Oral Q4H    sodium citrate-citric acid 500-334 mg/5 ml  30 mL Oral BID     Continuous Infusions:   sodium chloride 0.9% 100 mL/hr at 07/23/20 1050     PRN Meds:acetaminophen, acetaminophen, cadexomer iodine, dextrose 50%, diphenhydrAMINE, glucagon (human recombinant), insulin aspart U-100, melatonin, morphine, ondansetron, polyethylene glycol, sodium chloride 0.9%, Pharmacy to dose Vancomycin consult **AND** vancomycin - pharmacy to dose    Review of patient's allergies indicates:   Allergen Reactions    Ciprofloxacin      Itchiness    Contrast media      Kidney injury    Pcn [penicillins]      Rash; tolerated ceftriaxone on 1/13/20        Past Medical History:   Diagnosis Date    Anxiety     Depression     Diabetes mellitus     type 2    GERD (gastroesophageal reflux disease)     Hyperlipemia     Hypertension     Myocardial infarction 2010    minor-caused by stress per pt.     Past Surgical History:   Procedure Laterality Date    Angiogram - Right Extremity Right 7/9/15    angiogram-left leg  10/6/15    CATHETERIZATION OF BOTH LEFT AND RIGHT HEART N/A 12/18/2019    Procedure: CATHETERIZATION, HEART, BOTH LEFT AND RIGHT;  Surgeon: Que Fernando III, MD;  Location: [facility] CATH LAB;  Service: Cardiology;  Laterality: N/A;    CORONARY ANGIOGRAPHY N/A 12/18/2019    Procedure: ANGIOGRAM, CORONARY ARTERY;  Surgeon: Que Fernando III, MD;  Location: [facility] CATH LAB;  Service: Cardiology;  Laterality: N/A;    CORONARY ARTERY BYPASS GRAFT (CABG) N/A 1/14/2020    Procedure: CORONARY ARTERY BYPASS GRAFT (CABG) x 1     Off Pump;  Surgeon: Huang Altamirano MD;  Location: [facility] OR [address] [place] [address];  Service: Cardiovascular;  Laterality: N/A;    INSERTION OF TUNNELED CENTRAL VENOUS HEMODIALYSIS CATHETER N/A 1/27/2020    Procedure: Insertion, Catheter,  Central Venous, Hemodialysis;  Surgeon: ESTEBAN Gomez III, MD;  Location: [facility] CATH LAB;  Service: Peripheral Vascular;  Laterality: N/A;       Family History     None        Tobacco Use    Smoking status: Never Smoker    Smokeless tobacco: Never Used   Substance and Sexual Activity    Alcohol use: No    Drug use: Yes     Types: Marijuana     Comment: used yesterday    Sexual activity: Not on file     Review of Systems   Constitutional: Negative.    Respiratory: Negative.    Cardiovascular: Negative.    Gastrointestinal: Negative.    Genitourinary: Negative.    Musculoskeletal: Positive for arthralgias.   Skin: Positive for wound.   Neurological: Negative.    Psychiatric/Behavioral: Negative.      Objective:     Vital Signs (Most Recent):  Temp: 97.7 °F (36.5 °C) (07/23/20 1138)  Pulse: 85 (07/23/20 1138)  Resp: 19 (07/23/20 1138)  BP: 118/73 (07/23/20 1138)  SpO2: 99 % (07/23/20 1138) Vital Signs (24h Range):  Temp:  [96.2 °F (35.7 °C)-98.1 °F (36.7 °C)] 97.7 °F (36.5 °C)  Pulse:  [81-94] 85  Resp:  [16-19] 19  SpO2:  [98 %-100 %] 99 %  BP: (118-150)/(62-85) 118/73     Weight: 87.5 kg (192 lb 14.4 oz)  Body mass index is 32.1 kg/m².    Foot Exam    General  Orientation: alert and oriented to person, place, and time       Right Foot/Ankle     Inspection and Palpation  Swelling: none     Neurovascular  Dorsalis pedis: 1+  Posterior tibial: 1+  Saphenous nerve sensation: diminished  Tibial nerve sensation: diminished  Superficial peroneal nerve sensation: diminished  Deep peroneal nerve sensation: diminished  Sural nerve sensation: diminished      Left Foot/Ankle      Inspection and Palpation  Tenderness: calcaneus tenderness   Skin Exam: ulcer;     Neurovascular  Dorsalis pedis: 1+  Posterior tibial: 1+  Saphenous nerve sensation: diminished  Tibial nerve sensation: diminished  Superficial peroneal nerve sensation: diminished  Deep peroneal nerve sensation: diminished  Sural nerve sensation:  diminished          7/22/20:  Wound 1: Left heel   Measurement:  5.3cmx5.3cmx0.5cm   Base: fibrous and necrotic eschar base  Periwound skin: HPK  Drainage: serous  Erythema: mild  Probe: probe to bone.             7/22/20:  Wound 1: Left heel   Measurement:  5.3cmx5.3cmx0.5cm   Base: fibrous and necrotic eschar base  Periwound skin: HPK  Drainage: serous  Erythema: mild  Probe: probe to bone.   Periwound maceration noted.             7/21/2020    Wound 1: Left heel   Measurement:  5.3cmx5.3cmx0.5cm   Base: fibrous and necrotic eschar base  Periwound skin: HPK  Drainage: serous  Erythema: mild  Probe: probe to bone.             7/18/20:      Pre debridement         Post debridement         Laboratory:  CBC:   Recent Labs   Lab 07/23/20  0419   WBC 8.51   RBC 3.14*   HGB 9.4*   HCT 29.8*   *   MCV 95   MCH 29.9   MCHC 31.5*     CMP:   Recent Labs   Lab 07/17/20  1631  07/22/20  0453 07/23/20  0419   *   < > 408* 133*   CALCIUM 9.5   < > 8.1* 8.3*   ALBUMIN 3.0*   < > 2.1*  --    PROT 8.5*  --   --   --    *   < > 130* 137   K 4.3   < > 4.7 4.1   CO2 18*   < > 19* 23      < > 101 106   BUN 38*   < > 50* 43*   CREATININE 2.5*   < > 2.0* 1.5*   ALKPHOS 115  --   --   --    ALT <5*  --   --   --    AST 11  --   --   --    BILITOT 0.5  --   --   --     < > = values in this interval not displayed.       Microbiology Results (last 7 days)     Procedure Component Value Units Date/Time    Blood Culture #2 **CANNOT BE ORDERED STAT** [[id number]] Collected: 07/17/20 1712    Order Status: Completed Specimen: Blood from Peripheral, Hand, Left Updated: 07/22/20 1903     Blood Culture, Routine No growth after 5 days.    Blood Culture #1 **CANNOT BE ORDERED STAT** [[id number]] Collected: 07/17/20 1631    Order Status: Completed Specimen: Blood from Peripheral, Antecubital, Right Updated: 07/22/20 1903     Blood Culture, Routine No growth after 5 days.    Aerobic culture [[id number]]  (Abnormal)   (Susceptibility) Collected: 07/18/20 0929    Order Status: Completed Specimen: Bone from Foot, Left Updated: 07/21/20 0745     Aerobic Bacterial Culture ENTEROCOCCUS FAECALIS  Moderate        STREPTOCOCCUS AGALACTIAE (GROUP B)  Few  Beta-hemolytic streptococci are routinely susceptible to   penicillins,cephalosporins and carbapenems.        ENTEROBACTER CLOACAE  Rare      Gram stain [[id number]] Collected: 07/18/20 0929    Order Status: Completed Specimen: Bone from Foot, Left Updated: 07/19/20 0346     Gram Stain Result No WBC's      No organisms seen          Diagnostic Results:  X-Ray Foot Complete Left  Order: [id number]  Status:  Final result   Visible to patient:  No (not released) Next appt:  None Dx:  Non healing left heel wound  Details    Reading Physician Reading Date Result Priority   Solomon Jauregui DO  [phone]  [phone] 7/17/2020 STAT      Narrative & Impression     EXAMINATION:  XR FOOT COMPLETE 3 VIEW LEFT     CLINICAL HISTORY:  .  Unspecified open wound, left foot, initial encounter     TECHNIQUE:  AP, lateral and oblique views of the left foot were performed.     COMPARISON:  None     FINDINGS:  No evidence for acute fracture line or dislocation of the left foot.  Prominent mixed density opacity overlies the 1st distal phalanx with radiopaque and soft tissue density overall indeterminate.  Clinical correlation advised.     There is a large lucency projected over the calcaneal soft tissues concerning for possible ulceration.  No definite bony erosion of the underlying calcaneus.  Clinical correlation and further evaluation with MRI as warranted if patient compatible.  There is prominent vascular calcifications within the soft tissues.     Impression:     Please see above              MRI Foot (Hindfoot) Left Without Contrast  Order: [id number]  Status:  Final result   Visible to patient:  No (not released) Next appt:  None  Details    Reading Physician Reading Date Result Priority   Ayesha SIMPSON  MD [place]  [phone] 7/17/2020 STAT      Narrative & Impression     EXAMINATION:  MRI FOOT (HINDFOOT) LEFT WITHOUT CONTRAST     CLINICAL HISTORY:  Osteomyelitis suspected, diabetic;calcaneus osteo;     TECHNIQUE:  Multiplanar, multisequence images were obtained of the left hind foot without the use of IV contrast.     COMPARISON:  Left foot radiographs from the same date.     FINDINGS:  Osseous edema with cortical indistinctness and T1 hypointensity are seen involving the posterior aspect of the calcaneus consistent with osteomyelitis, noting overlying ulceration seen posteriorly in this region.  Achilles tendon is intact.  Remaining visualized osseous structures show no significant osseous edema, fracture, or marrow replacement process.  No focal fluid collections seen.  There is posterior subcutaneous edema.  No significant ankle joint effusion.  Mild edema is also noted in Kager's fat pad.     Impression:     Osteomyelitis involving the posterior aspect of the calcaneus.           US Lower Extremity Arteries Bilateral  Order: [id number]  Status:  Final result   Visible to patient:  No (not released) Next appt:  None Dx:  PAD (peripheral artery disease)  Details    Reading Physician Reading Date Result Priority   Ayesha Lindo MD  [phone] 7/17/2020 STAT      Narrative & Impression     EXAMINATION:  US LOWER EXTREMITY ARTERIES BILATERAL     CLINICAL HISTORY:  Peripheral vascular disease, unspecified     TECHNIQUE:  Bilateral spectral, color and grayscale images of the large arteries of both lower extremities were performed.     COMPARISON:  CT lower extremity runoff from 01/03/2020.     FINDINGS:  Right lower extremity:     Common femoral artery: 128 cm/sec, biphasic     SFA proximal: 210 cm/sec, triphasic     SFA mid: 105 cm/sec, triphasic     SFA distal: 86 cm/sec, biphasic     Popliteal artery graft: Patent with velocity measuring 479 cm/sec at the proximal anastomosis, with velocities ranging from  49-54 cm/sec within the proximal to distal portions of the graft with monophasic waveform seen throughout.     Posterior tibial artery: 24 cm/sec     Anterior tibial artery: 55 cm/sec     Left lower extremity:     Common femoral artery to popliteal graft: Patent with velocity measuring 184 cm/sec at the proximal anastomosis with triphasic waveforms.  Monophasic waveforms are seen distally within the graft with velocities measuring 67 cm/sec proximally, 25 cm/sec midportion, and 53 cm/sec distally.     SFA proximal: 15 cm/sec, monophasic     SFA mid: 67 cm/sec, monophasic     SFA distal: 175 cm/sec, monophasic     Popliteal artery: 28 cm/sec, monophasic     Posterior tibial artery: 17 cm/sec     Anterior tibial artery: 57 cm/sec     Impression:     1. Patent right lower extremity popliteal artery bypass graft.  Elevated velocities seen at the proximal anastomosis measuring 479 cm/sec consistent with hemodynamically significant stenosis.  2. Patent left lower extremity common femoral artery to popliteal artery bypass graft.  Doubling of velocities seen within the mid to distal portion of the graft suggestive for hemodynamically significant stenosis.  3. Doubling of velocities with hemodynamically significant stenosis between the left popliteal artery and anterior tibial artery.                 Left   Left arm  mmHg      Left posterior tibial 53 mmHg      Left dorsalis pedis 255 mmHg      Left ATTILA 2.13       Left toe pressure 0 mmHg      Left TBI 0             Clinical Findings:  Left heel ulceration with probe to bone.    Assessment/Plan:     Active Diagnoses:    Diagnosis Date Noted POA    PRINCIPAL PROBLEM:  Acute osteomyelitis of left calcaneus [M86.172] 07/18/2020 Yes    Hyponatremia [E87.1] 07/22/2020 Yes    Infection due to enterococcus [A49.1] 07/20/2020 Yes    Gram-negative infection [A49.9] 07/20/2020 Yes    Stage 3 chronic kidney disease [N18.3] 07/18/2020 Yes    Non healing left heel wound  [S91.302A] 07/17/2020 Yes    S/P CABG x 1 [Z95.1] 01/27/2020 Not Applicable    Mixed hyperlipidemia [E78.2] 12/13/2019 Yes    Chronic combined systolic and diastolic heart failure [I50.42] 12/08/2019 Yes    Type 2 diabetes mellitus without complication, with long-term current use of insulin [E11.9, Z79.4] 05/05/2018 Not Applicable    Essential hypertension [I10] 05/05/2018 Yes    Peripheral artery disease [I73.9]  Yes      Problems Resolved During this Admission:     Discussed two options with patient. L BKA vs IV abx for six weeks with aggressive wound care for several months with no guarantee of wound resolution.  Patient would like to try IV abx, wound care at this time.     ID on board to tailor abx.     Arterial studies show severe PAOD, vascular surgery on board with plans for intervention.    Pending vascular surgery reccs. Will plan for further debridement in OR early week. Will contact vascular surgery to see if it is possible for vascular surgery to perform debridement directly following intervention tomorrow     Podiatry will follow.     Continue Nursing dressing changes.     Thank you for your consult. I will follow-up with patient. Please contact us if you have any additional questions.    Magnolia Newton DPM  Podiatry  [facility]

## 2020-07-23 NOTE — PLAN OF CARE
Problem: Wound  Goal: Optimal Wound Healing  Outcome: Ongoing, Progressing     Problem: Fall Injury Risk  Goal: Absence of Fall and Fall-Related Injury  Outcome: Ongoing, Progressing     Problem: Adult Inpatient Plan of Care  Goal: Plan of Care Review  Outcome: Ongoing, Progressing     Problem: Adult Inpatient Plan of Care  Goal: Patient-Specific Goal (Individualization)  Outcome: Ongoing, Progressing

## 2020-07-23 NOTE — ASSESSMENT & PLAN NOTE
Uncontrolled with hyperglycemia. Manifestations likely of CKD 3    Will check an A1c- > 14.  Glucose increased secondary to steroids.  Added insulin with meals.  Improving

## 2020-07-23 NOTE — PLAN OF CARE
Problem: Wound  Goal: Optimal Wound Healing  Outcome: Ongoing, Progressing     Problem: Fall Injury Risk  Goal: Absence of Fall and Fall-Related Injury  Outcome: Ongoing, Progressing     Problem: Adult Inpatient Plan of Care  Goal: Plan of Care Review  Outcome: Ongoing, Progressing     Problem: Diabetes Comorbidity  Goal: Blood Glucose Level Within Desired Range  Outcome: Ongoing, Progressing     Problem: Electrolyte Imbalance (Acute Kidney Injury/Impairment)  Goal: Serum Electrolyte Balance  Outcome: Ongoing, Progressing     Problem: Oral Intake Inadequate (Acute Kidney Injury/Impairment)  Goal: Optimal Nutrition Intake  Outcome: Ongoing, Progressing     Problem: Renal Function Impairment (Acute Kidney Injury/Impairment)  Goal: Effective Renal Function  Outcome: Ongoing, Progressing     Problem: Infection  Goal: Infection Symptom Resolution  Outcome: Ongoing, Progressing

## 2020-07-24 LAB
ANION GAP SERPL CALC-SCNC: 7 MMOL/L (ref 8–16)
AORTIC ROOT ANNULUS: 2.92 CM
AORTIC VALVE CUSP SEPERATION: 1.49 CM
ASCENDING AORTA: 2.65 CM
AV INDEX (PROSTH): 0.69
AV MEAN GRADIENT: 5 MMHG
AV PEAK GRADIENT: 8 MMHG
AV VALVE AREA: 2.34 CM2
AV VELOCITY RATIO: 0.69
BSA FOR ECHO PROCEDURE: 2 M2
BUN SERPL-MCNC: 29 MG/DL (ref 6–20)
CALCIUM SERPL-MCNC: 8.1 MG/DL (ref 8.7–10.5)
CHLORIDE SERPL-SCNC: 107 MMOL/L (ref 95–110)
CO2 SERPL-SCNC: 21 MMOL/L (ref 23–29)
CREAT SERPL-MCNC: 1.2 MG/DL (ref 0.5–1.4)
CV ECHO LV RWT: 0.63 CM
DOP CALC AO PEAK VEL: 1.43 M/S
DOP CALC AO VTI: 37.05 CM
DOP CALC LVOT AREA: 3.4 CM2
DOP CALC LVOT DIAMETER: 2.08 CM
DOP CALC LVOT PEAK VEL: 0.99 M/S
DOP CALC LVOT STROKE VOLUME: 86.84 CM3
DOP CALCLVOT PEAK VEL VTI: 25.57 CM
E WAVE DECELERATION TIME: 205.8 MSEC
E/A RATIO: 0.72
ECHO LV POSTERIOR WALL: 1.36 CM (ref 0.6–1.1)
EST. GFR  (AFRICAN AMERICAN): 60 ML/MIN/1.73 M^2
EST. GFR  (NON AFRICAN AMERICAN): 52 ML/MIN/1.73 M^2
FRACTIONAL SHORTENING: 24 % (ref 28–44)
GLUCOSE SERPL-MCNC: 146 MG/DL (ref 70–110)
INTERVENTRICULAR SEPTUM: 1.39 CM (ref 0.6–1.1)
IVRT: 121.11 MSEC
LA MAJOR: 5.22 CM
LA MINOR: 5.09 CM
LA WIDTH: 4.49 CM
LEFT ATRIUM SIZE: 3.69 CM
LEFT ATRIUM VOLUME INDEX: 37.3 ML/M2
LEFT ATRIUM VOLUME: 72.59 CM3
LEFT INTERNAL DIMENSION IN SYSTOLE: 3.32 CM (ref 2.1–4)
LEFT VENTRICLE DIASTOLIC VOLUME INDEX: 43.61 ML/M2
LEFT VENTRICLE DIASTOLIC VOLUME: 84.78 ML
LEFT VENTRICLE MASS INDEX: 118 G/M2
LEFT VENTRICLE SYSTOLIC VOLUME INDEX: 23 ML/M2
LEFT VENTRICLE SYSTOLIC VOLUME: 44.71 ML
LEFT VENTRICULAR INTERNAL DIMENSION IN DIASTOLE: 4.34 CM (ref 3.5–6)
LEFT VENTRICULAR MASS: 229.11 G
MAGNESIUM SERPL-MCNC: 1.8 MG/DL (ref 1.6–2.6)
MV PEAK A VEL: 1.05 M/S
MV PEAK E VEL: 0.76 M/S
PHOSPHATE SERPL-MCNC: 2.8 MG/DL (ref 2.7–4.5)
PISA TR MAX VEL: 2.9 M/S
POCT GLUCOSE: 157 MG/DL (ref 70–110)
POCT GLUCOSE: 163 MG/DL (ref 70–110)
POCT GLUCOSE: 203 MG/DL (ref 70–110)
POCT GLUCOSE: 242 MG/DL (ref 70–110)
POCT GLUCOSE: 57 MG/DL (ref 70–110)
POCT GLUCOSE: 62 MG/DL (ref 70–110)
POTASSIUM SERPL-SCNC: 4.3 MMOL/L (ref 3.5–5.1)
PULM VEIN S/D RATIO: 2.14
PV PEAK D VEL: 0.28 M/S
PV PEAK S VEL: 0.6 M/S
PV PEAK VELOCITY: 0.88 CM/S
RA MAJOR: 5.2 CM
RA PRESSURE: 3 MMHG
RA WIDTH: 3.92 CM
RIGHT VENTRICULAR END-DIASTOLIC DIMENSION: 3.73 CM
RV TISSUE DOPPLER FREE WALL SYSTOLIC VELOCITY 1 (APICAL 4 CHAMBER VIEW): 10.71 CM/S
SINUS: 2.72 CM
SODIUM SERPL-SCNC: 135 MMOL/L (ref 136–145)
STJ: 2.48 CM
TR MAX PG: 34 MMHG
TRICUSPID ANNULAR PLANE SYSTOLIC EXCURSION: 1.4 CM
TV REST PULMONARY ARTERY PRESSURE: 37 MMHG
VANCOMYCIN SERPL-MCNC: 14.9 UG/ML

## 2020-07-24 PROCEDURE — 80048 BASIC METABOLIC PNL TOTAL CA: CPT

## 2020-07-24 PROCEDURE — 21400001 HC TELEMETRY ROOM

## 2020-07-24 PROCEDURE — 63600175 PHARM REV CODE 636 W HCPCS: Performed by: NURSE PRACTITIONER

## 2020-07-24 PROCEDURE — 75710 PR  ANGIO EXTREMITY UNILAT: ICD-10-PCS | Mod: 26,,, | Performed by: SURGERY

## 2020-07-24 PROCEDURE — 83735 ASSAY OF MAGNESIUM: CPT

## 2020-07-24 PROCEDURE — 99232 SBSQ HOSP IP/OBS MODERATE 35: CPT | Mod: ,,, | Performed by: INTERNAL MEDICINE

## 2020-07-24 PROCEDURE — 63600175 PHARM REV CODE 636 W HCPCS: Performed by: HOSPITALIST

## 2020-07-24 PROCEDURE — 36200 PR PLACE CATH AORTA: ICD-10-PCS | Mod: ,,, | Performed by: SURGERY

## 2020-07-24 PROCEDURE — 36415 COLL VENOUS BLD VENIPUNCTURE: CPT

## 2020-07-24 PROCEDURE — 99233 SBSQ HOSP IP/OBS HIGH 50: CPT | Mod: 25,,, | Performed by: SURGERY

## 2020-07-24 PROCEDURE — 99152 PR MOD CONSCIOUS SEDATION, SAME PHYS, 5+ YRS, FIRST 15 MIN: ICD-10-PCS | Mod: ,,, | Performed by: SURGERY

## 2020-07-24 PROCEDURE — 25000003 PHARM REV CODE 250: Performed by: HOSPITALIST

## 2020-07-24 PROCEDURE — 25000003 PHARM REV CODE 250: Performed by: PHYSICIAN ASSISTANT

## 2020-07-24 PROCEDURE — 63600175 PHARM REV CODE 636 W HCPCS: Performed by: SURGERY

## 2020-07-24 PROCEDURE — 99233 PR SUBSEQUENT HOSPITAL CARE,LEVL III: ICD-10-PCS | Mod: 25,,, | Performed by: SURGERY

## 2020-07-24 PROCEDURE — 99152 MOD SED SAME PHYS/QHP 5/>YRS: CPT | Mod: ,,, | Performed by: SURGERY

## 2020-07-24 PROCEDURE — 99233 PR SUBSEQUENT HOSPITAL CARE,LEVL III: ICD-10-PCS | Mod: ,,, | Performed by: NURSE PRACTITIONER

## 2020-07-24 PROCEDURE — 84100 ASSAY OF PHOSPHORUS: CPT

## 2020-07-24 PROCEDURE — 75710 ARTERY X-RAYS ARM/LEG: CPT | Mod: 26,,, | Performed by: SURGERY

## 2020-07-24 PROCEDURE — 80202 ASSAY OF VANCOMYCIN: CPT

## 2020-07-24 PROCEDURE — 25500020 PHARM REV CODE 255: Performed by: HOSPITALIST

## 2020-07-24 PROCEDURE — 99233 SBSQ HOSP IP/OBS HIGH 50: CPT | Mod: ,,, | Performed by: NURSE PRACTITIONER

## 2020-07-24 PROCEDURE — S0073 INJECTION, AZTREONAM, 500 MG: HCPCS | Performed by: HOSPITALIST

## 2020-07-24 PROCEDURE — 99232 PR SUBSEQUENT HOSPITAL CARE,LEVL II: ICD-10-PCS | Mod: ,,, | Performed by: INTERNAL MEDICINE

## 2020-07-24 PROCEDURE — 36200 PLACE CATHETER IN AORTA: CPT | Mod: ,,, | Performed by: SURGERY

## 2020-07-24 PROCEDURE — 25000003 PHARM REV CODE 250: Performed by: NURSE PRACTITIONER

## 2020-07-24 RX ORDER — HEPARIN SODIUM 1000 [USP'U]/ML
INJECTION, SOLUTION INTRAVENOUS; SUBCUTANEOUS CODE/TRAUMA/SEDATION MEDICATION
Status: COMPLETED | OUTPATIENT
Start: 2020-07-24 | End: 2020-07-24

## 2020-07-24 RX ORDER — ALPRAZOLAM 0.5 MG/1
0.5 TABLET ORAL 2 TIMES DAILY PRN
Status: DISCONTINUED | OUTPATIENT
Start: 2020-07-24 | End: 2020-07-30 | Stop reason: HOSPADM

## 2020-07-24 RX ORDER — CEFEPIME HYDROCHLORIDE 1 G/50ML
2 INJECTION, SOLUTION INTRAVENOUS
Status: DISCONTINUED | OUTPATIENT
Start: 2020-07-24 | End: 2020-07-29

## 2020-07-24 RX ORDER — SODIUM,POTASSIUM PHOSPHATES 280-250MG
1 POWDER IN PACKET (EA) ORAL ONCE
Status: COMPLETED | OUTPATIENT
Start: 2020-07-24 | End: 2020-07-24

## 2020-07-24 RX ORDER — MIDAZOLAM HYDROCHLORIDE 1 MG/ML
INJECTION INTRAMUSCULAR; INTRAVENOUS CODE/TRAUMA/SEDATION MEDICATION
Status: COMPLETED | OUTPATIENT
Start: 2020-07-24 | End: 2020-07-24

## 2020-07-24 RX ORDER — PROTAMINE SULFATE 10 MG/ML
INJECTION, SOLUTION INTRAVENOUS CODE/TRAUMA/SEDATION MEDICATION
Status: COMPLETED | OUTPATIENT
Start: 2020-07-24 | End: 2020-07-24

## 2020-07-24 RX ORDER — FENTANYL CITRATE 50 UG/ML
INJECTION, SOLUTION INTRAMUSCULAR; INTRAVENOUS CODE/TRAUMA/SEDATION MEDICATION
Status: COMPLETED | OUTPATIENT
Start: 2020-07-24 | End: 2020-07-24

## 2020-07-24 RX ORDER — ALLOPURINOL 100 MG/1
100 TABLET ORAL DAILY
Status: DISCONTINUED | OUTPATIENT
Start: 2020-07-25 | End: 2020-07-30 | Stop reason: HOSPADM

## 2020-07-24 RX ADMIN — HEPARIN SODIUM 5000 UNITS: 5000 INJECTION INTRAVENOUS; SUBCUTANEOUS at 10:07

## 2020-07-24 RX ADMIN — ALLOPURINOL 50 MG: 100 TABLET ORAL at 09:07

## 2020-07-24 RX ADMIN — AMIODARONE HYDROCHLORIDE 400 MG: 200 TABLET ORAL at 09:07

## 2020-07-24 RX ADMIN — CARVEDILOL 6.25 MG: 6.25 TABLET, FILM COATED ORAL at 09:07

## 2020-07-24 RX ADMIN — CLOPIDOGREL BISULFATE 75 MG: 75 TABLET, FILM COATED ORAL at 09:07

## 2020-07-24 RX ADMIN — POTASSIUM & SODIUM PHOSPHATES POWDER PACK 280-160-250 MG 1 PACKET: 280-160-250 PACK at 03:07

## 2020-07-24 RX ADMIN — FENTANYL CITRATE 50 MCG: 50 INJECTION INTRAMUSCULAR; INTRAVENOUS at 01:07

## 2020-07-24 RX ADMIN — PROTAMINE SULFATE 5 MG: 10 INJECTION, SOLUTION INTRAVENOUS at 02:07

## 2020-07-24 RX ADMIN — MORPHINE SULFATE 2 MG: 10 INJECTION INTRAVENOUS at 12:07

## 2020-07-24 RX ADMIN — INSULIN ASPART 1 UNITS: 100 INJECTION, SOLUTION INTRAVENOUS; SUBCUTANEOUS at 10:07

## 2020-07-24 RX ADMIN — MORPHINE SULFATE 2 MG: 10 INJECTION INTRAVENOUS at 04:07

## 2020-07-24 RX ADMIN — SODIUM CITRATE AND CITRIC ACID MONOHYDRATE 30 ML: 500; 334 SOLUTION ORAL at 09:07

## 2020-07-24 RX ADMIN — AZTREONAM 1000 MG: 1 INJECTION, POWDER, LYOPHILIZED, FOR SOLUTION INTRAMUSCULAR; INTRAVENOUS at 01:07

## 2020-07-24 RX ADMIN — SODIUM CHLORIDE: 0.9 INJECTION, SOLUTION INTRAVENOUS at 09:07

## 2020-07-24 RX ADMIN — ASPIRIN 81 MG 81 MG: 81 TABLET ORAL at 09:07

## 2020-07-24 RX ADMIN — ATORVASTATIN CALCIUM 80 MG: 40 TABLET, FILM COATED ORAL at 09:07

## 2020-07-24 RX ADMIN — VANCOMYCIN HYDROCHLORIDE 750 MG: 750 INJECTION, POWDER, LYOPHILIZED, FOR SOLUTION INTRAVENOUS at 10:07

## 2020-07-24 RX ADMIN — ALPRAZOLAM 0.5 MG: 0.5 TABLET ORAL at 10:07

## 2020-07-24 RX ADMIN — GABAPENTIN 100 MG: 100 CAPSULE ORAL at 09:07

## 2020-07-24 RX ADMIN — HEPARIN SODIUM 5000 UNITS: 5000 INJECTION INTRAVENOUS; SUBCUTANEOUS at 05:07

## 2020-07-24 RX ADMIN — MORPHINE SULFATE 2 MG: 10 INJECTION INTRAVENOUS at 08:07

## 2020-07-24 RX ADMIN — GABAPENTIN 100 MG: 100 CAPSULE ORAL at 10:07

## 2020-07-24 RX ADMIN — SODIUM CITRATE AND CITRIC ACID MONOHYDRATE 30 ML: 500; 334 SOLUTION ORAL at 10:07

## 2020-07-24 RX ADMIN — IOHEXOL 44 ML: 300 INJECTION, SOLUTION INTRAVENOUS at 02:07

## 2020-07-24 RX ADMIN — AZTREONAM 1000 MG: 1 INJECTION, POWDER, LYOPHILIZED, FOR SOLUTION INTRAMUSCULAR; INTRAVENOUS at 09:07

## 2020-07-24 RX ADMIN — CEFEPIME HYDROCHLORIDE 2 G: 2 INJECTION, SOLUTION INTRAVENOUS at 05:07

## 2020-07-24 RX ADMIN — HEPARIN SODIUM 2000 UNITS: 1000 INJECTION, SOLUTION INTRAVENOUS; SUBCUTANEOUS at 01:07

## 2020-07-24 RX ADMIN — GABAPENTIN 100 MG: 100 CAPSULE ORAL at 03:07

## 2020-07-24 RX ADMIN — PANTOPRAZOLE SODIUM 40 MG: 40 TABLET, DELAYED RELEASE ORAL at 09:07

## 2020-07-24 RX ADMIN — ACETAMINOPHEN 650 MG: 325 TABLET ORAL at 06:07

## 2020-07-24 RX ADMIN — MIDAZOLAM HYDROCHLORIDE 1 MG: 1 INJECTION, SOLUTION INTRAMUSCULAR; INTRAVENOUS at 01:07

## 2020-07-24 NOTE — ASSESSMENT & PLAN NOTE
Uncontrolled with hyperglycemia. Manifestations likely of CKD 3      - Will check an A1c- > 14.  - Glucose increased secondary to steroids.  - Added insulin with meals.  Improving

## 2020-07-24 NOTE — OP NOTE
Date:7/24/20     Surgeon: Teddy Huber MD RPVI     Assistant: None     Pre-op Diagnosis:   1. Left lower extremity atherosclerosis with left heel wound involving necrosis of underlying muscle  2. HTN  3. HLD  4. DM  5. CKD stage 3  6. History of tobacco abuse     Post-op Diagnosis: Same     Procedures:   1. US guided R CFA access  2. R femoral angiogram  3. Aortogram  4. LLE angiogram  5. Moderate sedation     Anesthesia: Local     EBL: Minimal     Findings:   adequate artery for access, L SFA and AK popliteal artery occlusion     Complications:  None; patient tolerated the procedure well.     Disposition: Recovery- hemodynamically stable in good condition     Attending Attestation: I was present and scrubbed for the entire procedure.  After an informed consent was obtained the patient was brought to the interventional suite and placed in the supine position. Both the patient and procedure were confirmed and identified during timeout process. The patient received perioperative antibiotics. The patient's bilateral groins were prepped and draped in usual sterile fashion. Using ultrasound guidance, the right common femoral artery vessel patency was confirmed. Then direct ultrasound guidance the right CFA was entered with a 21-G needle, Mandril wire and 4-Fr micropuncture needle. Then under fluoroscopic guidance, an 0.035-in wire was placed into the distal aorta. The micropuncture dilator was then exchanged over the wire for a 6-Romanian sheath. Sheathogram demonstrated access in the CFA. Next a VCF-catheter was placed over the wire and into the distal aorta under fluoroscopy and an aortogram with left leg angiogram was performed which demonstrated:  R femoral artery:  no flow limiting stenosis  Aorto-iliac vessels:  no flow limiting stenosis  left Common femoral, profunda femoral arteries:  no flow limiting stenosis  left Superficial femoral artery:  occluded  left Popliteal artery:  above knee popliteal artery  occlusion including stent, reconstitution of behind and below knee popliteal artery  Tibials:  3v runoff with mild disease, AT diminutive flow into lower leg    Based on the images from this diagnostic angio, a decision was made not to intervene.     Heparin was reversed with 5 mg protamine. We removed the 5fr sheath and kishan pressure was held.  At the conclusion of the case the patient was noted to have no evidence of a groin hematoma. All instrument and sponge counts were correct at the end of the case. The patient tolerated the procedure well and was transferred to the pacu for further recovery.      MODERATE SEDATION   I was present for moderate sedation and monitored the patients cardio respiratory functions during the procedure. See nurses notes for Intra-service start and end times and for the log of the medications, doseage and route.     Time of sedation:  60 mins.

## 2020-07-24 NOTE — NURSING
Bedside Report given to DEANGELO Stokes. Walking rounds completed. Visualized and assessed patient NAD noted. Safety precautions maintained and call light within reach.     Chart check completed.

## 2020-07-24 NOTE — PROGRESS NOTES
[facility]  Nephrology  Progress Note    Patient Name: Suyapa Connelly  MRN: [id number]  Admission Date: 7/17/2020  Hospital Length of Stay: 7 days  Attending Provider: Silvestre Espana MD   Primary Care Physician: Erlinda Rahman NP  Principal Problem:Acute osteomyelitis of left calcaneus  Date of service 7/24/2020  Consult for: ROSLYN       Subjective:     Interval History: No events overnight. Pt had Co2 angiogram today w/ IVF pre-procedure. Renal function continues to improve. Good UOP, although not documenting. O2 demands stable. Co2 trending down. Phos replaced yesterday, with some improvement, but remains low normal. No complaints today. Able to lay flat with no SOB.     Review of patient's allergies indicates:   Allergen Reactions    Ciprofloxacin      Itchiness    Contrast media      Kidney injury    Pcn [penicillins]      Rash; tolerated ceftriaxone on 1/13/20     Current Facility-Administered Medications   Medication Frequency    acetaminophen tablet 650 mg Q8H PRN    acetaminophen tablet 650 mg Q8H PRN    allopurinol split tablet 50 mg Daily    ALPRAZolam tablet 0.5 mg BID PRN    amiodarone tablet 400 mg Daily    aspirin chewable tablet 81 mg Daily    atorvastatin tablet 80 mg Daily    aztreonam 1 g in dextrose 5 % 50 mL IVPB (ready to mix system) Q8H    cadexomer iodine 0.9 % gel Daily PRN    carvediloL tablet 6.25 mg Daily    clopidogreL tablet 75 mg Daily    dextrose 50% injection 12.5 g PRN    diphenhydrAMINE capsule 25 mg Q6H PRN    ergocalciferol capsule 50,000 Units Q7 Days    fentaNYL injection Code/trauma/sedation Med    gabapentin capsule 100 mg TID    glucagon (human recombinant) injection 1 mg PRN    heparin (porcine) injection 5,000 Units Q8H    heparin (porcine) injection Code/trauma/sedation Med    insulin aspart U-100 pen 0-5 Units Q6H PRN    insulin aspart U-100 pen 5 Units TID WM    insulin detemir U-100 pen 10 Units Daily    melatonin tablet 6  mg Nightly PRN    midazolam (VERSED) 1 mg/mL injection Code/trauma/sedation Med    morphine injection 2 mg Q4H PRN    ondansetron injection 8 mg Q8H PRN    pantoprazole EC tablet 40 mg Daily    polyethylene glycol packet 17 g BID PRN    sodium chloride 0.9% flush 10 mL PRN    sodium citrate-citric acid 500-334 mg/5 ml solution 30 mL BID    vancomycin - pharmacy to dose pharmacy to manage frequency       Objective:     Vital Signs (Most Recent):  Temp: 98.6 °F (37 °C) (07/24/20 1202)  Pulse: 80 (07/24/20 1335)  Resp: 16 (07/24/20 1335)  BP: (!) 141/76 (07/24/20 1335)  SpO2: 100 % (07/24/20 1335)  O2 Device (Oxygen Therapy): nasal cannula (07/24/20 1310) Vital Signs (24h Range):  Temp:  [97.5 °F (36.4 °C)-98.6 °F (37 °C)] 98.6 °F (37 °C)  Pulse:  [] 80  Resp:  [16-20] 16  SpO2:  [94 %-100 %] 100 %  BP: (123-171)/(67-88) 141/76     Weight: 87.5 kg (192 lb 14.4 oz) (07/20/20 0418)  Body mass index is 32.1 kg/m².  Body surface area is 2 meters squared.    I/O last 3 completed shifts:  In: -   Out: 1625 [Urine:1625]    Physical Exam  Constitutional:       General: She is not in acute distress.     Appearance: She is well-developed. She is not diaphoretic.   HENT:      Head: Normocephalic and atraumatic.      Mouth/Throat:      Mouth: Mucous membranes are moist.   Eyes:      Conjunctiva/sclera: Conjunctivae normal.   Cardiovascular:      Rate and Rhythm: Normal rate and regular rhythm.      Heart sounds: No murmur.   Pulmonary:      Effort: Pulmonary effort is normal.      Breath sounds: Normal breath sounds. No wheezing, rhonchi or rales.   Abdominal:      General: There is no distension.      Palpations: Abdomen is soft.   Musculoskeletal:         General: No swelling or deformity.      Right lower leg: No edema.      Left lower leg: No edema.   Skin:     General: Skin is warm and dry.      Findings: No rash.   Neurological:      Mental Status: She is alert and oriented to person, place, and time.    Psychiatric:         Behavior: Behavior normal.         Significant Labs:sure  CBC:   Recent Labs   Lab 07/23/20  0419   WBC 8.51   RBC 3.14*   HGB 9.4*   HCT 29.8*   *   MCV 95   MCH 29.9   MCHC 31.5*     CMP:   Recent Labs   Lab 07/17/20  1631  07/22/20  0453  07/24/20  0511   *   < > 408*   < > 146*   CALCIUM 9.5   < > 8.1*   < > 8.1*   ALBUMIN 3.0*   < > 2.1*  --   --    PROT 8.5*  --   --   --   --    *   < > 130*   < > 135*   K 4.3   < > 4.7   < > 4.3   CO2 18*   < > 19*   < > 21*      < > 101   < > 107   BUN 38*   < > 50*   < > 29*   CREATININE 2.5*   < > 2.0*   < > 1.2   ALKPHOS 115  --   --   --   --    ALT <5*  --   --   --   --    AST 11  --   --   --   --    BILITOT 0.5  --   --   --   --     < > = values in this interval not displayed.     PTH:   Recent Labs   Lab 07/22/20  0453   PTH 77.8*     Recent Labs   Lab 07/22/20  0037   COLORU Straw   SPECGRAV 1.015   PHUR 5.0   PROTEINUA 2+*   BACTERIA None   NITRITE Negative   LEUKOCYTESUR Negative   UROBILINOGEN Negative   HYALINECASTS 0     All labs within the past 24 hours have been reviewed.    Significant Imaging:  Labs: Reviewed    Assessment/Plan:   ROSLYN on CKD 3  - Has hx of HD dependant ROSLYN (LEVI) requiring RRT from 1/16/20 -3/10/20  - Recent baseline Cr unclear, appears to be 1.4 -1.9 (GFR 30-43%); though consideration for possible progression of CKD; suspect underlying diabetic nephropathy Hgb A 1c > 14  - OA 7/17, Cr 2.5 and BUN 38  - Etiology unclear at this time; possibly d/t hyperglycemia? Glucose 483 on admit. - Renal US 7/17 unremarkable, UA c/w ROSLYN  - Renal function improving, Cr and BUN downtrending; good UOP   - UA with 2+ protein 1+ blood, Akosua 21 and UPrCr 2.5 g/g  - caution with IVF in advanced CKD patients - monitor O2 closely  - renally dose meds for current GFR/ avoid nephrotoxic meds- adjusted gabapentin  - strict Is/ Os/ daily weights  - prehydrated with IVF prior to angiogram today, recommend gentle post  procedure hydration  - daily labs while admitted  - no longer needs RENAL diet restrictions     Hypervolemia   - mild; some crackles to LL, mild peripheral edema, and intermittent SOB  - on RA-1L NC ; good UOP  - monitor O2 closely while on IVF - low threshold to hold IVF if resp status declines   - previously on diuretics at home - monitor for need to dose gentle PO lasix   - CXR without evidence pulm edema    Hyperkalemia  - K trending down overall with medical management  -  Can lift renal diet restrictions once no longer NPO  - f/u AM labs     Metabolic Acidosis   - likely d/t ROSLYN + CKD; exacerbated by NS  - initial ABG c/w primary met acidosis   - co2 trending down today  - continue Bicitra 30 ml PO BID, titrate PRN - may need to increase over weekend  - AM labs     CKD MBD  - CCa wnl; PTH 78 and Vit D 7  - continue ergo   - Phos trending up s/p  phosnak 2 packets x2 doses yesterday   - will give phosnak 1 packet x 1 this evening  - labs in AM     Proteinuria   - UA 2+ protein UPrCr 2.5 g/g   - would benefit fron ACE/ ARB once ROSLYN resolves    Hyperuricemia   - uric acid 8.6  -increase allopurinol 100 mg PO QD  - recheck uric acid level in AM      DOS: 07/24/2020    Ofelia Bergeron NP   Nephrology  [facility]

## 2020-07-24 NOTE — PROGRESS NOTES
[facility] Hospital Medicine  Progress Note    Patient Name: Suyapa Connelly  MRN: [id number]  Patient Class: IP- Inpatient   Admission Date: 7/17/2020  Length of Stay: 7 days  Attending Physician: Silvestre Espana MD  Primary Care Provider: Erlinda Rahman NP        Subjective:     Principal Problem:Acute osteomyelitis of left calcaneus        HPI:  Suyapa Connelly is a 54 y.o. female that (in part)  has a past medical history of Anxiety, Depression, Diabetes mellitus, GERD (gastroesophageal reflux disease), Hyperlipemia, Hypertension, and Myocardial infarction.  has a past surgical history that includes Angiogram - Right Extremity (Right, 7/9/15); angiogram-left leg (10/6/15); Catheterization of both left and right heart (N/A, 12/18/2019); Coronary angiography (N/A, 12/18/2019); Coronary Artery Bypass Graft (CABG) (N/A, 1/14/2020); and Insertion of tunneled central venous hemodialysis catheter (N/A, 1/27/2020). Presents to [facility] Emergency Department complaining of the left heel foot wound.  This is been there for several weeks with progressive worsening.  Poor healing.  Patient became concerned due to malodorous drainage.  Pain with ambulation and weight-bearing.  Associated hyperglycemia.  Denies fever chills.    In the emergency department physical exam concerning for osteomyelitis secondary to diabetic foot ulcer in the setting of uncontrolled diabetes.  Routine laboratory studies revealed hyperglycemia.  X-ray of the foot performed.  Concerning for osteomyelitis.  MRI confirmed this.  Podiatry was consulted.  Broad-spectrum antibiotics were initiated in the ED a special consideration given to risk of Pseudomonas infection.    Hospital medicine has been asked to admit to inpatient for further evaluation and treatment.     Overview/Hospital Course:  Suyapa Connelly is a 54 y.o. female that (in part)  has a past medical history of Anxiety, Depression, Diabetes  mellitus, GERD (gastroesophageal reflux disease), Hyperlipemia, Hypertension, and Myocardial infarction.  has a past surgical history that includes Angiogram - Right Extremity (Right, 7/9/15); angiogram-left leg (10/6/15); Catheterization of both left and right heart (N/A, 12/18/2019); Coronary angiography (N/A, 12/18/2019); Coronary Artery Bypass Graft (CABG) (N/A, 1/14/2020); and Insertion of tunneled central venous hemodialysis catheter (N/A, 1/27/2020). Presents to [facility] Emergency Department complaining of the left heel foot wound.  MRI confirmed osteo. Podiatry, ID, and vascular were consulted.  Podiatry discussed options with patient. She has chosen 6 weeks of IV abx. ID has been consulted to tailor abx. She has been continued on Aztreonam.  Blood cultures were NG. Bone bx on 7/18. Vascular Surgery consulted and planning angiogram.  Patient with renal failure and Nephrology consulted.  Renal function continues to improve and ok to proceed with vascular intervention.  Podiatry planning debridement post vascular procedure.    Interval History:  Continues with intermittent pain to left heel after dressing change. Plan for Angio 7/24/2020 per Vascular Surgery.  Nephrology and ID following.     Review of Systems   Constitutional: Negative for chills and fever.   HENT: Negative for congestion, ear pain and trouble swallowing.    Eyes: Negative for visual disturbance.   Respiratory: Negative for cough and shortness of breath.    Cardiovascular: Negative for chest pain and leg swelling.   Gastrointestinal: Negative for abdominal pain, nausea and vomiting.   Endocrine: Negative for cold intolerance and heat intolerance.   Genitourinary: Negative for decreased urine volume and dysuria.   Musculoskeletal: Positive for gait problem (left foot wound).   Skin: Positive for wound (left foot ).   Neurological: Negative for syncope and headaches.   Hematological: Does not bruise/bleed easily.    Psychiatric/Behavioral: Negative.      Objective:     Vital Signs (Most Recent):  Temp: 98.3 °F (36.8 °C) (07/24/20 0826)  Pulse: 100 (07/24/20 0826)  Resp: 19 (07/24/20 0826)  BP: (!) 171/86 (07/24/20 0826)  SpO2: 99 % (07/24/20 0826) Vital Signs (24h Range):  Temp:  [97.5 °F (36.4 °C)-98.5 °F (36.9 °C)] 98.3 °F (36.8 °C)  Pulse:  [] 100  Resp:  [16-20] 19  SpO2:  [94 %-100 %] 99 %  BP: (118-171)/(67-86) 171/86     Weight: 87.5 kg (192 lb 14.4 oz)  Body mass index is 32.1 kg/m².  No intake or output data in the 24 hours ending 07/24/20 0919     Physical Exam  Vitals signs and nursing note reviewed.   Constitutional:       General: She is not in acute distress.     Appearance: She is well-developed. She is obese.   HENT:      Head: Normocephalic and atraumatic.      Right Ear: Hearing and external ear normal.      Left Ear: Hearing and external ear normal.      Nose: Nose normal.      Mouth/Throat:      Mouth: Mucous membranes are moist.      Pharynx: Oropharynx is clear. Uvula midline. No oropharyngeal exudate.   Eyes:      Conjunctiva/sclera: Conjunctivae normal.      Pupils: Pupils are equal, round, and reactive to light.   Neck:      Musculoskeletal: Normal range of motion and neck supple.      Thyroid: No thyromegaly.      Trachea: No tracheal deviation.   Cardiovascular:      Rate and Rhythm: Regular rhythm. Tachycardia present.      Pulses:           Left popliteal pulse not accessible.        Dorsalis pedis pulses are 1+ on the right side.      Heart sounds: Normal heart sounds. No murmur.   Pulmonary:      Effort: Pulmonary effort is normal. No respiratory distress.      Breath sounds: Normal breath sounds.   Abdominal:      General: Bowel sounds are normal.      Palpations: Abdomen is soft.      Tenderness: There is no abdominal tenderness.   Musculoskeletal: Normal range of motion.         General: No tenderness.      Comments: Left foot wrapped with gauze dressing.    Feet:      Comments: Left  foot bandage CDI  Skin:     General: Skin is warm and dry.      Coloration: Skin is not pale.   Neurological:      General: No focal deficit present.      Mental Status: She is alert and oriented to person, place, and time. Mental status is at baseline.   Psychiatric:         Attention and Perception: Attention normal.         Mood and Affect: Mood normal.         Speech: Speech normal.         Significant Labs:   BMP:   Recent Labs   Lab 07/24/20  0511   *   *   K 4.3      CO2 21*   BUN 29*   CREATININE 1.2   CALCIUM 8.1*   MG 1.8     CBC:   Recent Labs   Lab 07/23/20  0419   WBC 8.51   HGB 9.4*   HCT 29.8*   *       Significant Imaging: I have reviewed all pertinent imaging results/findings within the past 24 hours.      Assessment/Plan:      * Acute osteomyelitis of left calcaneus  - MRI confirms osteo. Bone Bx- 7/18  - Afebrile and with resolved leukocytosis   - Wound cultures with Enterococcus, E cloacae, and GBS.   - ID consulted for ABx recommendations and will d/c axtreoman and start cefepime 2g q12h; continue vancomycin   - Vascular Surgery consulted and planning angiogram 7/24/2020.  - Patient declines amputation and wants to try conservative tx with  IV ABx's.   - CKD and Nephrology consulted.  Vascular discussed case with Nephrology and patient cleared from renal for vascular intervention.    - Podiatry planning debridement post vascular procedure possibly 7/27/2020.           Peripheral artery disease  Angiogram as above.      Hyponatremia  - Noted on admission, and appears improved.   - Monitor with daily labs      Stage 3 chronic kidney disease  Appreciate Nephrology input.  Creat improving since admission.  Hyperkalemia on 7/21.  Treated with Calcium, Albuterol and Kayexalate.  Resolved         Non healing left heel wound  As above.      S/P CABG x 1  No acute issues       Mixed hyperlipidemia  Stable      Chronic combined systolic and diastolic heart failure  No acute  issues   EF 50% on recent echo   Gently hydrate prior to angiogram.      Essential hypertension  Continue current management.      Type 2 diabetes mellitus without complication, with long-term current use of insulin  Uncontrolled with hyperglycemia. Manifestations likely of CKD 3      - Will check an A1c- > 14.  - Glucose increased secondary to steroids.  - Added insulin with meals.  Improving         VTE Risk Mitigation (From admission, onward)         Ordered     heparin (porcine) injection 5,000 Units  Every 8 hours      07/17/20 2217     IP VTE HIGH RISK PATIENT  Once      07/17/20 2217     Place sequential compression device  Until discontinued      07/17/20 2217     Place HAILEY hose  Until discontinued      07/17/20 2217                      Janeth Pruett NP  Department of Hospital Medicine   [facility]

## 2020-07-24 NOTE — PROGRESS NOTES
Pharmacokinetic Assessment Follow Up: IV Vancomycin    Vancomycin serum concentration assessment(s):    The random level was drawn correctly and can be used to guide therapy at this time. The measurement is within the desired definitive target range of 10 to 20 mcg/mL.    Vancomycin Regimen Plan:    Re-dose when the random level is less than 20 mcg/mL, next level to be drawn at 0400 on 7/25/2020    Drug levels (last 3 results):  Recent Labs   Lab Result Units 07/22/20  0453 07/23/20  0419 07/24/20  0510   Vancomycin, Random ug/mL 18.9 20.7 14.9       Pharmacy will continue to follow and monitor vancomycin.    Please contact pharmacy at extension [phone] for questions regarding this assessment.    Thank you for the consult,   Hardik Valle       Patient brief summary:  Suyapa Connelly is a 54 y.o. female initiated on antimicrobial therapy with IV Vancomycin for treatment of skin & soft tissue infection    The patient's current regimen is random pulse dosing with a dose being administered when random level is < 20.    Drug Allergies:   Review of patient's allergies indicates:   Allergen Reactions    Ciprofloxacin      Itchiness    Contrast media      Kidney injury    Pcn [penicillins]      Rash; tolerated ceftriaxone on 1/13/20       Actual Body Weight:   87.5 kg    Renal Function:   Estimated Creatinine Clearance: 47.4 mL/min (A) (based on SCr of 1.5 mg/dL (H)).,     Dialysis Method (if applicable):  N/A    CBC (last 72 hours):  Recent Labs   Lab Result Units 07/22/20  0453 07/23/20  0419   WBC K/uL 15.74* 8.51   Hemoglobin g/dL 8.8* 9.4*   Hematocrit % 26.9* 29.8*   Platelets K/uL 325 358*   Gran% % 85.7* 59.0   Lymph% % 6.0* 30.4   Mono% % 7.6 8.7   Eosinophil% % 0.0 1.1   Basophil% % 0.1 0.4   Differential Method  Automated Automated       Metabolic Panel (last 72 hours):  Recent Labs   Lab Result Units 07/21/20  0925 07/21/20  1151 07/22/20  0037 07/22/20  0047 07/22/20  0453 07/23/20  0419   Sodium mmol/L  133*  --   --   --  130* 137   Sodium Urine Random mmol/L  --   --   --  21  --   --    Potassium mmol/L 5.7* 5.4*  --   --  4.7 4.1   Chloride mmol/L 106  --   --   --  101 106   CO2 mmol/L 16*  --   --   --  19* 23   Glucose mg/dL 333*  --   --   --  408* 133*   Glucose, UA   --   --  3+*  --   --   --    BUN, Bld mg/dL 47*  --   --   --  50* 43*   Creatinine mg/dL 1.9*  --   --   --  2.0* 1.5*   Creatinine, Random Ur mg/dL  --   --   --  56.7  56.7  --   --    Albumin g/dL 2.3*  --   --   --  2.1*  --    Phosphorus mg/dL 4.0  --   --   --  3.2 2.3*       Vancomycin Administrations:  vancomycin given in the last 96 hours                     vancomycin 500 mg in dextrose 5 % 100 mL IVPB (ready to mix system) (mg) 500 mg New Bag 07/23/20 0936    vancomycin 750 mg in dextrose 5 % 250 mL IVPB (ready to mix system) (mg) 750 mg New Bag 07/22/20 1245    vancomycin 750 mg in dextrose 5 % 250 mL IVPB (ready to mix system) (mg) 750 mg New Bag 07/21/20 1128    vancomycin 500 mg in dextrose 5 % 100 mL IVPB (ready to mix system) (mg) 500 mg New Bag 07/20/20 1228                    Microbiologic Results:  Microbiology Results (last 7 days)       Procedure Component Value Units Date/Time    Blood Culture #2 **CANNOT BE ORDERED STAT** [[id number]] Collected: 07/17/20 1712    Order Status: Completed Specimen: Blood from Peripheral, Hand, Left Updated: 07/22/20 1903     Blood Culture, Routine No growth after 5 days.    Blood Culture #1 **CANNOT BE ORDERED STAT** [[id number]] Collected: 07/17/20 1631    Order Status: Completed Specimen: Blood from Peripheral, Antecubital, Right Updated: 07/22/20 1903     Blood Culture, Routine No growth after 5 days.    Aerobic culture [[id number]]  (Abnormal)  (Susceptibility) Collected: 07/18/20 0940    Order Status: Completed Specimen: Bone from Foot, Left Updated: 07/21/20 4267     Aerobic Bacterial Culture ENTEROCOCCUS FAECALIS  Moderate        STREPTOCOCCUS AGALACTIAE (GROUP  B)  Few  Beta-hemolytic streptococci are routinely susceptible to   penicillins,cephalosporins and carbapenems.        ENTEROBACTER CLOACAE  Rare      Gram stain [[id number]] Collected: 07/18/20 0929    Order Status: Completed Specimen: Bone from Foot, Left Updated: 07/19/20 0346     Gram Stain Result No WBC's      No organisms seen

## 2020-07-24 NOTE — NURSING
Patient arrived to floor in stable condition. Visualized patient and assessed patient's overall condition and appearance. No complaints. NAD noted. Will continue to monitor.

## 2020-07-24 NOTE — PLAN OF CARE
Problem: Wound  Goal: Optimal Wound Healing  7/24/2020 0409 by Kike Bell RN  Outcome: Ongoing, Progressing  7/24/2020 0408 by Kike Bell RN  Outcome: Ongoing, Progressing     Problem: Fall Injury Risk  Goal: Absence of Fall and Fall-Related Injury  7/24/2020 0409 by Kike Bell RN  Outcome: Ongoing, Progressing  7/24/2020 0408 by Kike Bell RN  Outcome: Ongoing, Progressing     Problem: Adult Inpatient Plan of Care  Goal: Plan of Care Review  7/24/2020 0409 by Kike Bell RN  Outcome: Ongoing, Progressing  7/24/2020 0408 by Kike Bell RN  Outcome: Ongoing, Progressing  Goal: Patient-Specific Goal (Individualization)  7/24/2020 0409 by Kike Bell RN  Outcome: Ongoing, Progressing  7/24/2020 0408 by Kike Bell RN  Outcome: Ongoing, Progressing  Goal: Absence of Hospital-Acquired Illness or Injury  7/24/2020 0409 by Kike Bell RN  Outcome: Ongoing, Progressing  7/24/2020 0408 by Kike Bell RN  Outcome: Ongoing, Progressing  Goal: Optimal Comfort and Wellbeing  7/24/2020 0409 by Kike Bell RN  Outcome: Ongoing, Progressing  7/24/2020 0408 by Kike Bell RN  Outcome: Ongoing, Progressing  Goal: Readiness for Transition of Care  7/24/2020 0409 by Kike Bell RN  Outcome: Ongoing, Progressing  7/24/2020 0408 by Kike Bell RN  Outcome: Ongoing, Progressing  Goal: Rounds/Family Conference  7/24/2020 0409 by Kike Bell RN  Outcome: Ongoing, Progressing  7/24/2020 0408 by Kike Bell RN  Outcome: Ongoing, Progressing     Problem: Diabetes Comorbidity  Goal: Blood Glucose Level Within Desired Range  7/24/2020 0409 by Kike Bell RN  Outcome: Ongoing, Progressing  7/24/2020 0408 by Kike Bell RN  Outcome: Ongoing, Progressing     Problem: Electrolyte Imbalance (Acute Kidney Injury/Impairment)  Goal: Serum Electrolyte Balance  7/24/2020 0409 by Kike Bell RN  Outcome: Ongoing,  Progressing  7/24/2020 0408 by Kike Bell RN  Outcome: Ongoing, Progressing     Problem: Fluid Imbalance (Acute Kidney Injury/Impairment)  Goal: Optimal Fluid Balance  7/24/2020 0409 by Kike Bell RN  Outcome: Ongoing, Progressing  7/24/2020 0408 by Kike Bell RN  Outcome: Ongoing, Progressing     Problem: Hematologic Alteration (Acute Kidney Injury/Impairment)  Goal: Hemoglobin, Hematocrit and Platelets Within Normal Range  7/24/2020 0409 by Kike Bell RN  Outcome: Ongoing, Progressing  7/24/2020 0408 by Kike Bell RN  Outcome: Ongoing, Progressing     Problem: Oral Intake Inadequate (Acute Kidney Injury/Impairment)  Goal: Optimal Nutrition Intake  7/24/2020 0409 by Kike Bell RN  Outcome: Ongoing, Progressing  7/24/2020 0408 by Kike Bell RN  Outcome: Ongoing, Progressing     Problem: Renal Function Impairment (Acute Kidney Injury/Impairment)  Goal: Effective Renal Function  7/24/2020 0409 by Kike Bell RN  Outcome: Ongoing, Progressing  7/24/2020 0408 by Kike Bell RN  Outcome: Ongoing, Progressing     Problem: Infection  Goal: Infection Symptom Resolution  7/24/2020 0409 by Kkie Bell RN  Outcome: Ongoing, Progressing  7/24/2020 0408 by Kike Bell RN  Outcome: Ongoing, Progressing     Problem: Skin Injury Risk Increased  Goal: Skin Health and Integrity  7/24/2020 0409 by Kike Bell RN  Outcome: Ongoing, Progressing  7/24/2020 0408 by Kike Bell RN  Outcome: Ongoing, Progressing

## 2020-07-24 NOTE — SUBJECTIVE & OBJECTIVE
Interval History: ". 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 Wound stable. Unable to undergo angiogram due to creatinine level. Pending angiogram, possible revascularization, and debridement of infected bone tissue. Still awaiting vascular intervention.         Review of Systems   Unable to perform ROS: Other     Objective:     Vital Signs (Most Recent):  Temp: 98.6 °F (37 °C) (07/24/20 1202)  Pulse: 92 (07/24/20 1202)  Resp: 18 (07/24/20 1202)  BP: (!) 140/67 (07/24/20 1202)  SpO2: 100 % (07/24/20 1202) Vital Signs (24h Range):  Temp:  [97.5 °F (36.4 °C)-98.6 °F (37 °C)] 98.6 °F (37 °C)  Pulse:  [] 92  Resp:  [16-20] 18  SpO2:  [94 %-100 %] 100 %  BP: (123-171)/(67-86) 140/67     Weight: 87.5 kg (192 lb 14.4 oz)  Body mass index is 32.1 kg/m².    Estimated Creatinine Clearance: 59.2 mL/min (based on SCr of 1.2 mg/dL).    Physical Exam  Vitals signs and nursing note reviewed.   Constitutional:       General: She is sleeping.   Pulmonary:      Effort: Pulmonary effort is normal.   Musculoskeletal:      Comments: Left foot wrapped with gauze dressing.          Significant Labs:   Blood Culture:   Recent Labs   Lab 07/17/20  1631 07/17/20  1712   LABBLOO No growth after 5 days. No growth after 5 days.     BMP:   Recent Labs   Lab 07/24/20  0511   *   *   K 4.3      CO2 21*   BUN 29*   CREATININE 1.2   CALCIUM 8.1*   MG 1.8     CBC:   Recent Labs   Lab 07/23/20  0419   WBC 8.51   HGB 9.4*   HCT 29.8*   *     Wound Culture:   Recent Labs   Lab 07/18/20  0929   LABAERO ENTEROCOCCUS FAECALIS  Moderate  *  STREPTOCOCCUS AGALACTIAE (GROUP B)  Few  Beta-hemolytic streptococci are routinely susceptible to   penicillins,cephalosporins and carbapenems.  *  ENTEROBACTER CLOACAE  Rare  *       Significant Imaging: I have reviewed all pertinent imaging results/findings within the past 24 hours.

## 2020-07-24 NOTE — SEDATION DOCUMENTATION
CO2 administered through sheath in femoral artery per MD. Tolerated well. Fluoroscopy used to see distribution.

## 2020-07-24 NOTE — SUBJECTIVE & OBJECTIVE
Interval History:  Continues with intermittent pain to left heel after dressing change. Plan for Angio 7/24/2020 per Vascular Surgery.  Nephrology and ID following.     Review of Systems   Constitutional: Negative for chills and fever.   HENT: Negative for congestion, ear pain and trouble swallowing.    Eyes: Negative for visual disturbance.   Respiratory: Negative for cough and shortness of breath.    Cardiovascular: Negative for chest pain and leg swelling.   Gastrointestinal: Negative for abdominal pain, nausea and vomiting.   Endocrine: Negative for cold intolerance and heat intolerance.   Genitourinary: Negative for decreased urine volume and dysuria.   Musculoskeletal: Positive for gait problem (left foot wound).   Skin: Positive for wound (left foot ).   Neurological: Negative for syncope and headaches.   Hematological: Does not bruise/bleed easily.   Psychiatric/Behavioral: Negative.      Objective:     Vital Signs (Most Recent):  Temp: 98.3 °F (36.8 °C) (07/24/20 0826)  Pulse: 100 (07/24/20 0826)  Resp: 19 (07/24/20 0826)  BP: (!) 171/86 (07/24/20 0826)  SpO2: 99 % (07/24/20 0826) Vital Signs (24h Range):  Temp:  [97.5 °F (36.4 °C)-98.5 °F (36.9 °C)] 98.3 °F (36.8 °C)  Pulse:  [] 100  Resp:  [16-20] 19  SpO2:  [94 %-100 %] 99 %  BP: (118-171)/(67-86) 171/86     Weight: 87.5 kg (192 lb 14.4 oz)  Body mass index is 32.1 kg/m².  No intake or output data in the 24 hours ending 07/24/20 0919     Physical Exam  Vitals signs and nursing note reviewed.   Constitutional:       General: She is not in acute distress.     Appearance: She is well-developed. She is obese.   HENT:      Head: Normocephalic and atraumatic.      Right Ear: Hearing and external ear normal.      Left Ear: Hearing and external ear normal.      Nose: Nose normal.      Mouth/Throat:      Mouth: Mucous membranes are moist.      Pharynx: Oropharynx is clear. Uvula midline. No oropharyngeal exudate.   Eyes:      Conjunctiva/sclera:  Conjunctivae normal.      Pupils: Pupils are equal, round, and reactive to light.   Neck:      Musculoskeletal: Normal range of motion and neck supple.      Thyroid: No thyromegaly.      Trachea: No tracheal deviation.   Cardiovascular:      Rate and Rhythm: Regular rhythm. Tachycardia present.      Pulses:           Left popliteal pulse not accessible.        Dorsalis pedis pulses are 1+ on the right side.      Heart sounds: Normal heart sounds. No murmur.   Pulmonary:      Effort: Pulmonary effort is normal. No respiratory distress.      Breath sounds: Normal breath sounds.   Abdominal:      General: Bowel sounds are normal.      Palpations: Abdomen is soft.      Tenderness: There is no abdominal tenderness.   Musculoskeletal: Normal range of motion.         General: No tenderness.      Comments: Left foot wrapped with gauze dressing.    Feet:      Comments: Left foot bandage CDI  Skin:     General: Skin is warm and dry.      Coloration: Skin is not pale.   Neurological:      General: No focal deficit present.      Mental Status: She is alert and oriented to person, place, and time. Mental status is at baseline.   Psychiatric:         Attention and Perception: Attention normal.         Mood and Affect: Mood normal.         Speech: Speech normal.         Significant Labs:   BMP:   Recent Labs   Lab 07/24/20  0511   *   *   K 4.3      CO2 21*   BUN 29*   CREATININE 1.2   CALCIUM 8.1*   MG 1.8     CBC:   Recent Labs   Lab 07/23/20  0419   WBC 8.51   HGB 9.4*   HCT 29.8*   *       Significant Imaging: I have reviewed all pertinent imaging results/findings within the past 24 hours.

## 2020-07-24 NOTE — SUBJECTIVE & OBJECTIVE
Medications:  Continuous Infusions:  Scheduled Meds:   allopurinoL  50 mg Oral Daily    amiodarone  400 mg Oral Daily    aspirin  81 mg Oral Daily    atorvastatin  80 mg Oral Daily    aztreonam  1,000 mg Intravenous Q8H    carvediloL  6.25 mg Oral Daily    clopidogreL  75 mg Oral Daily    ergocalciferol  50,000 Units Oral Q7 Days    gabapentin  100 mg Oral TID    heparin (porcine)  5,000 Units Subcutaneous Q8H    insulin aspart U-100  5 Units Subcutaneous TID WM    insulin detemir U-100  10 Units Subcutaneous Daily    pantoprazole  40 mg Oral Daily    sodium citrate-citric acid 500-334 mg/5 ml  30 mL Oral BID    vancomycin (VANCOCIN) IVPB  750 mg Intravenous Once     PRN Meds:acetaminophen, acetaminophen, ALPRAZolam, cadexomer iodine, dextrose 50%, diphenhydrAMINE, glucagon (human recombinant), insulin aspart U-100, melatonin, morphine, ondansetron, polyethylene glycol, sodium chloride 0.9%, Pharmacy to dose Vancomycin consult **AND** vancomycin - pharmacy to dose     Objective:     Vital Signs (Most Recent):  Temp: 98.3 °F (36.8 °C) (07/24/20 0826)  Pulse: 100 (07/24/20 0826)  Resp: 19 (07/24/20 0826)  BP: (!) 171/86 (07/24/20 0826)  SpO2: 99 % (07/24/20 0826) Vital Signs (24h Range):  Temp:  [97.5 °F (36.4 °C)-98.5 °F (36.9 °C)] 98.3 °F (36.8 °C)  Pulse:  [] 100  Resp:  [16-20] 19  SpO2:  [94 %-100 %] 99 %  BP: (118-171)/(67-86) 171/86         Physical Exam  Vitals signs reviewed.   Constitutional:       General: She is not in acute distress.     Appearance: She is well-developed. She is not diaphoretic.   HENT:      Head: Normocephalic and atraumatic.   Eyes:      Conjunctiva/sclera: Conjunctivae normal.   Neck:      Musculoskeletal: Neck supple.   Cardiovascular:      Rate and Rhythm: Normal rate.      Pulses:           Femoral pulses are 2+ on the right side and 2+ on the left side.       Popliteal pulses are 2+ on the right side and 2+ on the left side.        Dorsalis pedis pulses are  detected w/ Doppler on the right side and detected w/ Doppler on the left side.        Posterior tibial pulses are detected w/ Doppler on the right side and detected w/ Doppler on the left side.   Pulmonary:      Effort: Pulmonary effort is normal.   Abdominal:      General: There is no distension.      Palpations: Abdomen is soft. There is no mass.      Tenderness: There is no abdominal tenderness. There is no guarding or rebound.      Hernia: No hernia is present.   Musculoskeletal: Normal range of motion.         General: No deformity.   Feet:      Left foot:      Skin integrity: Ulcer and skin breakdown present.   Skin:     General: Skin is warm and dry.      Findings: No rash.   Neurological:      General: No focal deficit present.      Mental Status: She is alert and oriented to person, place, and time.   Psychiatric:         Mood and Affect: Mood normal.         Significant Labs:  All pertinent labs from the last 24 hours have been reviewed.    Significant Diagnostics:  I have reviewed all pertinent imaging results/findings within the past 24 hours.

## 2020-07-24 NOTE — PLAN OF CARE
07/24/20 1544   Discharge Reassessment   Assessment Type Discharge Planning Reassessment   Provided patient/caregiver education on the expected discharge date and the discharge plan No   Do you have any problems affording any of your prescribed medications? TBD   Discharge Plan A Home with family;Home Health   DME Needed Upon Discharge  cane, straight   Patient choice form signed by patient/caregiver N/A   Anticipated Discharge Disposition Home-Health   Can the patient/caregiver answer the patient profile reliably? Yes, cognitively intact   How does the patient rate their overall health at the present time? Fair   Describe the patient's ability to walk at the present time. Walks with the help of equipment   How often would a person be available to care for the patient? Whenever needed   Number of comorbid conditions (as recorded on the chart) Five or more   Post-Acute Status   Post-Acute Authorization Home Health;Medications   Home Health Status Awaiting Internal Medical Clearance   Medication Status Pending Access Placement

## 2020-07-24 NOTE — NURSING
Report received from DEANGELO Stokes. Visualized patient and assessed patient's overall condition and appearance. No acute distress noted. Will continue to monitor

## 2020-07-24 NOTE — PLAN OF CARE
She would benefit from LLE bypass.  Will begin preop eval with Cardiology consult, Echo and vein mapping.

## 2020-07-24 NOTE — SEDATION DOCUMENTATION
MD assessing R groin via ultrasound for access site into R femoral artery. MD to use up-and-over approach to complete LLE angiogram.

## 2020-07-24 NOTE — PROGRESS NOTES
[facility]  Vascular Surgery  Progress Note    Patient Name: Suyapa Connelly  MRN: [id number]  Admission Date: 7/17/2020  Primary Care Provider: Erlinda Rahman NP    Subjective:     Interval History: No new issues or complaints today.    Post-Op Info:  * No surgery found *           Medications:  Continuous Infusions:  Scheduled Meds:   allopurinoL  50 mg Oral Daily    amiodarone  400 mg Oral Daily    aspirin  81 mg Oral Daily    atorvastatin  80 mg Oral Daily    aztreonam  1,000 mg Intravenous Q8H    carvediloL  6.25 mg Oral Daily    clopidogreL  75 mg Oral Daily    ergocalciferol  50,000 Units Oral Q7 Days    gabapentin  100 mg Oral TID    heparin (porcine)  5,000 Units Subcutaneous Q8H    insulin aspart U-100  5 Units Subcutaneous TID WM    insulin detemir U-100  10 Units Subcutaneous Daily    pantoprazole  40 mg Oral Daily    sodium citrate-citric acid 500-334 mg/5 ml  30 mL Oral BID    vancomycin (VANCOCIN) IVPB  750 mg Intravenous Once     PRN Meds:acetaminophen, acetaminophen, ALPRAZolam, cadexomer iodine, dextrose 50%, diphenhydrAMINE, glucagon (human recombinant), insulin aspart U-100, melatonin, morphine, ondansetron, polyethylene glycol, sodium chloride 0.9%, Pharmacy to dose Vancomycin consult **AND** vancomycin - pharmacy to dose     Objective:     Vital Signs (Most Recent):  Temp: 98.3 °F (36.8 °C) (07/24/20 0826)  Pulse: 100 (07/24/20 0826)  Resp: 19 (07/24/20 0826)  BP: (!) 171/86 (07/24/20 0826)  SpO2: 99 % (07/24/20 0826) Vital Signs (24h Range):  Temp:  [97.5 °F (36.4 °C)-98.5 °F (36.9 °C)] 98.3 °F (36.8 °C)  Pulse:  [] 100  Resp:  [16-20] 19  SpO2:  [94 %-100 %] 99 %  BP: (118-171)/(67-86) 171/86         Physical Exam  Vitals signs reviewed.   Constitutional:       General: She is not in acute distress.     Appearance: She is well-developed. She is not diaphoretic.   HENT:      Head: Normocephalic and atraumatic.   Eyes:      Conjunctiva/sclera:  Conjunctivae normal.   Neck:      Musculoskeletal: Neck supple.   Cardiovascular:      Rate and Rhythm: Normal rate.      Pulses:           Femoral pulses are 2+ on the right side and 2+ on the left side.       Popliteal pulses are 2+ on the right side and 2+ on the left side.        Dorsalis pedis pulses are detected w/ Doppler on the right side and detected w/ Doppler on the left side.        Posterior tibial pulses are detected w/ Doppler on the right side and detected w/ Doppler on the left side.   Pulmonary:      Effort: Pulmonary effort is normal.   Abdominal:      General: There is no distension.      Palpations: Abdomen is soft. There is no mass.      Tenderness: There is no abdominal tenderness. There is no guarding or rebound.      Hernia: No hernia is present.   Musculoskeletal: Normal range of motion.         General: No deformity.   Feet:      Left foot:      Skin integrity: Ulcer and skin breakdown present.   Skin:     General: Skin is warm and dry.      Findings: No rash.   Neurological:      General: No focal deficit present.      Mental Status: She is alert and oriented to person, place, and time.   Psychiatric:         Mood and Affect: Mood normal.         Significant Labs:  All pertinent labs from the last 24 hours have been reviewed.    Significant Diagnostics:  I have reviewed all pertinent imaging results/findings within the past 24 hours.    Assessment/Plan:     Non healing left heel wound  -Toe pressures reviewed - rec LLE CO2 angio with possible intervention  -Cont Nephrology recs  -Cont offloading, wound care, optimization of comorbidities and nutrition         Teddy Huber MD  Vascular Surgery  [facility]

## 2020-07-24 NOTE — ASSESSMENT & PLAN NOTE
-Toe pressures reviewed - rec LLE CO2 angio with possible intervention  -Cont Nephrology recs  -Cont offloading, wound care, optimization of comorbidities and nutrition

## 2020-07-24 NOTE — PLAN OF CARE
Problem: Wound  Goal: Optimal Wound Healing  Outcome: Ongoing, Progressing     Problem: Fall Injury Risk  Goal: Absence of Fall and Fall-Related Injury  Outcome: Ongoing, Progressing     Problem: Adult Inpatient Plan of Care  Goal: Plan of Care Review  Outcome: Ongoing, Progressing     Problem: Diabetes Comorbidity  Goal: Blood Glucose Level Within Desired Range  Outcome: Ongoing, Progressing     Problem: Infection  Goal: Infection Symptom Resolution  Outcome: Ongoing, Progressing

## 2020-07-24 NOTE — NURSING
Bedside Report given to DEANGELO Robbins. Walking rounds completed. Visualized and assessed patient NAD noted. Safety precautions maintained and call light within reach.     Chart check completed.

## 2020-07-24 NOTE — ASSESSMENT & PLAN NOTE
- MRI confirms osteo. Bone Bx- 7/18  - Afebrile and with resolved leukocytosis   - Wound cultures with Enterococcus, E cloacae, and GBS.   - ID consulted for ABx recommendations and will d/c axtreoman and start cefepime 2g q12h; continue vancomycin   - Vascular Surgery consulted and planning angiogram 7/24/2020.  - Patient declines amputation and wants to try conservative tx with  IV ABx's.   - CKD and Nephrology consulted.  Vascular discussed case with Nephrology and patient cleared from renal for vascular intervention.    - Podiatry planning debridement post vascular procedure possibly 7/27/2020.

## 2020-07-24 NOTE — ASSESSMENT & PLAN NOTE
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 Cultures now with Enterococcus, E cloacae, and GBS. Afebrile and with resolved leukocytosis which was likely a leukemoid reaction to steroids.   · Patient has tolerated cephalosporins in past. Recommend switching aztreonam to cefepime 2 gm IV every 12 hours as it is unclear when her surgical intervention will occur.    · Continue vancomycin.   · Patient likely to require IV antibiotics. Please discuss PICC vs subclavian catheter for IV antibiotics with nephrology.

## 2020-07-24 NOTE — BRIEF OP NOTE
[facility]  Brief Operative Note    Surgery Date:  7/24/20    Surgeon: Teddy Huber MD    Assistant: Fely Lara MS3    Pre-op Diagnosis:    1. Left lower extremity atherosclerosis with left heel wound involving necrosis of underlying muscle  2. HTN  3. HLD  4. DM  5. CKD stage 3  6. History of tobacco abuse    Post-op Diagnosis:   1. same    Procedure:  1. US guided R CFA access  2. R femoral angiogram  3. Aortogram  4. LLE angiogram  5. Moderate sedation    Anesthesia: Moderate sedation    Description of the findings of the procedure(s): adequate artery for access, L SFA and AK popliteal artery occlusion    Estimated Blood Loss: <5 cc         Specimens:   Specimen (12h ago, onward)    None

## 2020-07-24 NOTE — NURSING
Report called in from Sharon in IR. V/S: /73, O2 100% on RA, R 16, HR 82. Pt tolerated well. Awaiting  Pt's arrival to floor.

## 2020-07-25 PROBLEM — Z01.810 PREOP CARDIOVASCULAR EXAM: Status: ACTIVE | Noted: 2020-07-25

## 2020-07-25 LAB
ANION GAP SERPL CALC-SCNC: 5 MMOL/L (ref 8–16)
BASOPHILS # BLD AUTO: 0.02 K/UL (ref 0–0.2)
BASOPHILS NFR BLD: 0.3 % (ref 0–1.9)
BUN SERPL-MCNC: 22 MG/DL (ref 6–20)
CALCIUM SERPL-MCNC: 8.3 MG/DL (ref 8.7–10.5)
CHLORIDE SERPL-SCNC: 105 MMOL/L (ref 95–110)
CO2 SERPL-SCNC: 26 MMOL/L (ref 23–29)
CREAT SERPL-MCNC: 1.2 MG/DL (ref 0.5–1.4)
DIFFERENTIAL METHOD: ABNORMAL
EOSINOPHIL # BLD AUTO: 0.1 K/UL (ref 0–0.5)
EOSINOPHIL NFR BLD: 1.2 % (ref 0–8)
ERYTHROCYTE [DISTWIDTH] IN BLOOD BY AUTOMATED COUNT: 14.6 % (ref 11.5–14.5)
EST. GFR  (AFRICAN AMERICAN): 60 ML/MIN/1.73 M^2
EST. GFR  (NON AFRICAN AMERICAN): 52 ML/MIN/1.73 M^2
GLUCOSE SERPL-MCNC: 206 MG/DL (ref 70–110)
HCT VFR BLD AUTO: 27.3 % (ref 37–48.5)
HGB BLD-MCNC: 8.6 G/DL (ref 12–16)
IMM GRANULOCYTES # BLD AUTO: 0.02 K/UL (ref 0–0.04)
IMM GRANULOCYTES NFR BLD AUTO: 0.3 % (ref 0–0.5)
LYMPHOCYTES # BLD AUTO: 1.5 K/UL (ref 1–4.8)
LYMPHOCYTES NFR BLD: 19.9 % (ref 18–48)
MAGNESIUM SERPL-MCNC: 1.8 MG/DL (ref 1.6–2.6)
MCH RBC QN AUTO: 29.9 PG (ref 27–31)
MCHC RBC AUTO-ENTMCNC: 31.5 G/DL (ref 32–36)
MCV RBC AUTO: 95 FL (ref 82–98)
MONOCYTES # BLD AUTO: 0.7 K/UL (ref 0.3–1)
MONOCYTES NFR BLD: 9.6 % (ref 4–15)
NEUTROPHILS # BLD AUTO: 5.3 K/UL (ref 1.8–7.7)
NEUTROPHILS NFR BLD: 68.7 % (ref 38–73)
NRBC BLD-RTO: 0 /100 WBC
PHOSPHATE SERPL-MCNC: 2.7 MG/DL (ref 2.7–4.5)
PLATELET # BLD AUTO: 313 K/UL (ref 150–350)
PMV BLD AUTO: 10.9 FL (ref 9.2–12.9)
POCT GLUCOSE: 145 MG/DL (ref 70–110)
POCT GLUCOSE: 197 MG/DL (ref 70–110)
POCT GLUCOSE: 215 MG/DL (ref 70–110)
POCT GLUCOSE: 216 MG/DL (ref 70–110)
POCT GLUCOSE: 221 MG/DL (ref 70–110)
POCT GLUCOSE: 88 MG/DL (ref 70–110)
POTASSIUM SERPL-SCNC: 4.3 MMOL/L (ref 3.5–5.1)
RBC # BLD AUTO: 2.88 M/UL (ref 4–5.4)
SODIUM SERPL-SCNC: 136 MMOL/L (ref 136–145)
URATE SERPL-MCNC: 5.8 MG/DL (ref 2.4–5.7)
VANCOMYCIN SERPL-MCNC: 12 UG/ML
WBC # BLD AUTO: 7.64 K/UL (ref 3.9–12.7)

## 2020-07-25 PROCEDURE — 36415 COLL VENOUS BLD VENIPUNCTURE: CPT

## 2020-07-25 PROCEDURE — 25000003 PHARM REV CODE 250: Performed by: PHYSICIAN ASSISTANT

## 2020-07-25 PROCEDURE — 85025 COMPLETE CBC W/AUTO DIFF WBC: CPT

## 2020-07-25 PROCEDURE — 63600175 PHARM REV CODE 636 W HCPCS: Performed by: HOSPITALIST

## 2020-07-25 PROCEDURE — 80202 ASSAY OF VANCOMYCIN: CPT

## 2020-07-25 PROCEDURE — 83735 ASSAY OF MAGNESIUM: CPT

## 2020-07-25 PROCEDURE — 84100 ASSAY OF PHOSPHORUS: CPT

## 2020-07-25 PROCEDURE — 84550 ASSAY OF BLOOD/URIC ACID: CPT

## 2020-07-25 PROCEDURE — 25000003 PHARM REV CODE 250: Performed by: HOSPITALIST

## 2020-07-25 PROCEDURE — 99233 PR SUBSEQUENT HOSPITAL CARE,LEVL III: ICD-10-PCS | Mod: ,,, | Performed by: INTERNAL MEDICINE

## 2020-07-25 PROCEDURE — 21400001 HC TELEMETRY ROOM

## 2020-07-25 PROCEDURE — 99233 SBSQ HOSP IP/OBS HIGH 50: CPT | Mod: ,,, | Performed by: STUDENT IN AN ORGANIZED HEALTH CARE EDUCATION/TRAINING PROGRAM

## 2020-07-25 PROCEDURE — 99233 PR SUBSEQUENT HOSPITAL CARE,LEVL III: ICD-10-PCS | Mod: ,,, | Performed by: STUDENT IN AN ORGANIZED HEALTH CARE EDUCATION/TRAINING PROGRAM

## 2020-07-25 PROCEDURE — 80048 BASIC METABOLIC PNL TOTAL CA: CPT

## 2020-07-25 PROCEDURE — 63600175 PHARM REV CODE 636 W HCPCS: Performed by: NURSE PRACTITIONER

## 2020-07-25 PROCEDURE — 25000003 PHARM REV CODE 250: Performed by: NURSE PRACTITIONER

## 2020-07-25 PROCEDURE — 99233 SBSQ HOSP IP/OBS HIGH 50: CPT | Mod: ,,, | Performed by: INTERNAL MEDICINE

## 2020-07-25 RX ORDER — INSULIN ASPART 100 [IU]/ML
6 INJECTION, SOLUTION INTRAVENOUS; SUBCUTANEOUS
Status: DISCONTINUED | OUTPATIENT
Start: 2020-07-25 | End: 2020-07-26

## 2020-07-25 RX ORDER — SODIUM BICARBONATE 325 MG/1
650 TABLET ORAL DAILY
Status: DISCONTINUED | OUTPATIENT
Start: 2020-07-26 | End: 2020-07-27

## 2020-07-25 RX ORDER — SODIUM CITRATE AND CITRIC ACID MONOHYDRATE 334; 500 MG/5ML; MG/5ML
15 SOLUTION ORAL DAILY
Status: DISCONTINUED | OUTPATIENT
Start: 2020-07-26 | End: 2020-07-25

## 2020-07-25 RX ORDER — VANCOMYCIN HCL IN 5 % DEXTROSE 1G/250ML
1000 PLASTIC BAG, INJECTION (ML) INTRAVENOUS ONCE
Status: COMPLETED | OUTPATIENT
Start: 2020-07-25 | End: 2020-07-25

## 2020-07-25 RX ADMIN — CARVEDILOL 6.25 MG: 6.25 TABLET, FILM COATED ORAL at 08:07

## 2020-07-25 RX ADMIN — AMIODARONE HYDROCHLORIDE 400 MG: 200 TABLET ORAL at 08:07

## 2020-07-25 RX ADMIN — MORPHINE SULFATE 2 MG: 10 INJECTION INTRAVENOUS at 03:07

## 2020-07-25 RX ADMIN — CLOPIDOGREL BISULFATE 75 MG: 75 TABLET, FILM COATED ORAL at 08:07

## 2020-07-25 RX ADMIN — SODIUM CITRATE AND CITRIC ACID MONOHYDRATE 30 ML: 500; 334 SOLUTION ORAL at 08:07

## 2020-07-25 RX ADMIN — HEPARIN SODIUM 5000 UNITS: 5000 INJECTION INTRAVENOUS; SUBCUTANEOUS at 09:07

## 2020-07-25 RX ADMIN — CEFEPIME HYDROCHLORIDE 2 G: 2 INJECTION, SOLUTION INTRAVENOUS at 06:07

## 2020-07-25 RX ADMIN — HEPARIN SODIUM 5000 UNITS: 5000 INJECTION INTRAVENOUS; SUBCUTANEOUS at 06:07

## 2020-07-25 RX ADMIN — ATORVASTATIN CALCIUM 80 MG: 40 TABLET, FILM COATED ORAL at 08:07

## 2020-07-25 RX ADMIN — GABAPENTIN 100 MG: 100 CAPSULE ORAL at 08:07

## 2020-07-25 RX ADMIN — VANCOMYCIN HYDROCHLORIDE 1000 MG: 1 INJECTION, POWDER, LYOPHILIZED, FOR SOLUTION INTRAVENOUS at 11:07

## 2020-07-25 RX ADMIN — ALLOPURINOL 100 MG: 100 TABLET ORAL at 08:07

## 2020-07-25 RX ADMIN — HEPARIN SODIUM 5000 UNITS: 5000 INJECTION INTRAVENOUS; SUBCUTANEOUS at 02:07

## 2020-07-25 RX ADMIN — GABAPENTIN 100 MG: 100 CAPSULE ORAL at 02:07

## 2020-07-25 RX ADMIN — MORPHINE SULFATE 2 MG: 10 INJECTION INTRAVENOUS at 10:07

## 2020-07-25 RX ADMIN — INSULIN ASPART 6 UNITS: 100 INJECTION, SOLUTION INTRAVENOUS; SUBCUTANEOUS at 11:07

## 2020-07-25 RX ADMIN — ALPRAZOLAM 0.5 MG: 0.5 TABLET ORAL at 02:07

## 2020-07-25 RX ADMIN — INSULIN ASPART 5 UNITS: 100 INJECTION, SOLUTION INTRAVENOUS; SUBCUTANEOUS at 08:07

## 2020-07-25 RX ADMIN — MORPHINE SULFATE 2 MG: 10 INJECTION INTRAVENOUS at 02:07

## 2020-07-25 RX ADMIN — PANTOPRAZOLE SODIUM 40 MG: 40 TABLET, DELAYED RELEASE ORAL at 08:07

## 2020-07-25 RX ADMIN — ASPIRIN 81 MG 81 MG: 81 TABLET ORAL at 08:07

## 2020-07-25 RX ADMIN — MORPHINE SULFATE 2 MG: 10 INJECTION INTRAVENOUS at 08:07

## 2020-07-25 RX ADMIN — GABAPENTIN 100 MG: 100 CAPSULE ORAL at 09:07

## 2020-07-25 NOTE — HPI
Pt is a 53 yo F w/ PMH of HTN, HLD, DM2 w/ HgbA1C 9.7% 1/3/2020, CAD s/p 1v CABG LIMA to LAD 1/14/2020 and residual moderate stenosis of prox OM2, postop A. Fib on amiodarone, ICM w/ recovered LVEF 35-->50% and GIIDD, PAD s/p PTA w/ occluded SFA to Pop stents in her BLE 2015.  Cardiology consultation has been obtained for preoperative risk assessment prior to left lower extremity bypass surgery.  Patient states that since the bypass she has not experienced any further chest pains.  Her echocardiogram performed yesterday demonstrated improvement in heart function to around 50%.  She denies any chest pains at rest on exertion, orthopnea, PND.  I reviewed her coronary angiogram personally which was done prior to her bypass surgery.  She does residual moderate stenosis in OM2.  Her LAD has been bypassed with the LIMA.  There were plans to do PCI of the OM by Dr. Fernando, however because of acute kidney injury she was being managed medically.  Her main complaint is related to her left lower extremity which is being managed by vascular surgery.    Echo injuly 2020:  · Mildly decreased left ventricular systolic function. The estimated ejection fraction is 50%.  · Concentric left ventricular remodeling.  · Local segmental wall motion abnormalities.  · Septal wall has abnormal motion.  · Grade I (mild) left ventricular diastolic dysfunction consistent with impaired relaxation.  · Normal right ventricular systolic function.  · Severe tricuspid regurgitation.  · Mechanically ventilated; cannot use inferior caval vein diameter to estimate central venous pressure.      Cardiac catheterization:    · Two vessel coronary artery disease with 90% stenosis of the mid LAD, 75% stenosis of the mid OM2, and 70% stenosis of the ostial D1.  · LV filling pressure is elevated, LVEDP 25 mmHg.  · Ischemic Cardiomyopathy  · Elevated right heart filling pressures due to elevated left heart pressures, mPAP 38 mmHg, PCWP 25  mmHg.              Hospitalist HPI:  Suyapa Connelly is a 54 y.o. female that (in part)  has a past medical history of Anxiety, Depression, Diabetes mellitus, GERD (gastroesophageal reflux disease), Hyperlipemia, Hypertension, and Myocardial infarction. Patient has a past surgical history that includes Angiogram - Right Extremity (Right, 7/9/15); angiogram-left leg (10/6/15); Catheterization of both left and right heart (N/A, 12/18/2019); Coronary angiography (N/A, 12/18/2019); Coronary Artery Bypass Graft (CABG) (N/A, 1/14/2020); and Insertion of tunneled central venous hemodialysis catheter (N/A, 1/27/2020). Presents to [facility] Emergency Department complaining of the left worsening heel foot wound. Poor wound healing.  Patient became concerned due to malodorous drainage.  Pain with ambulation and weight-bearing.  Associated hyperglycemia.  Denies fever chills.     In the emergency department physical exam concerning for osteomyelitis secondary to diabetic foot ulcer in the setting of uncontrolled diabetes.  Routine laboratory studies revealed hyperglycemia.  X-ray of the foot performed.  Concerning for osteomyelitis.  MRI confirmed this.  Podiatry was consulted.  Broad-spectrum antibiotics were initiated in the ED a special consideration given to risk of Pseudomonas infection.     Hospital medicine has been asked to admit to inpatient for further evaluation and treatment.      Overview/Hospital Course:  Kyaw Connelly is a 54 y.o. female that (in part)  has a past medical history of Anxiety, Depression, Diabetes mellitus, GERD (gastroesophageal reflux disease), Hyperlipemia, Hypertension, and Myocardial infarction.  has a past surgical history that includes Angiogram - Right Extremity (Right, 7/9/15); angiogram-left leg (10/6/15); Catheterization of both left and right heart (N/A, 12/18/2019); Coronary angiography (N/A, 12/18/2019); Coronary Artery Bypass Graft (CABG) (N/A, 1/14/2020);  and Insertion of tunneled central venous hemodialysis catheter (N/A, 1/27/2020). Presents to [facility] Emergency Department complaining of the left heel foot wound.  MRI confirmed osteo. Podiatry, ID, and vascular were consulted.  Podiatry discussed options with patient. She has chosen 6 weeks of IV abx. ID has been consulted to tailor abx. She has been continued on Aztreonam.  Blood cultures were NG. Bone bx on 7/18 has grown Enterococcus faecalis, Grp B Strep, and Enterobacter cloacae. Vascular Surgery consulted and did an angiogram which revealed adequate artery for access the left SFA and AK popliteal artery occlusion.  Patient with renal failure and Nephrology consulted.  Renal function continues to improve and ok to proceed with vascular intervention.  Podiatry planning debridement post vascular procedure.  Creatinine peaked at 2.4 and is currently 1.2.

## 2020-07-25 NOTE — SUBJECTIVE & OBJECTIVE
Interval History: ***    ROS  Objective:     Vital Signs (Most Recent):  Temp: 98.1 °F (36.7 °C) (20 1103)  Pulse: 85 (20 1103)  Resp: 16 (20 1103)  BP: 137/72 (20 1103)  SpO2: 99 % (20 1103) Vital Signs (24h Range):  Temp:  [97.3 °F (36.3 °C)-98.6 °F (37 °C)] 98.1 °F (36.7 °C)  Pulse:  [79-98] 85  Resp:  [16-20] 16  SpO2:  [94 %-100 %] 99 %  BP: (112-164)/(59-88) 137/72     Weight: 87.1 kg (192 lb)  Body mass index is 31.95 kg/m².     SpO2: 99 %  O2 Device (Oxygen Therapy): room air      Intake/Output Summary (Last 24 hours) at 2020 1124  Last data filed at 2020 0100  Gross per 24 hour   Intake --   Output 500 ml   Net -500 ml       Lines/Drains/Airways     Peripheral Intravenous Line                 Midline Catheter Insertion/Assessment  - Single Lumen 20 1640 basilic vein (medial side of arm) 18g x 10cm 6 days                Physical Exam    Significant Labs: {Labs:61228}    Significant Imaging: {Imagin}

## 2020-07-25 NOTE — ASSESSMENT & PLAN NOTE
Appreciate Nephrology input.  Patient did have acute on chronic CKD 3 which has now resolved  Hyperkalemia on 7/21.  Treated with Calcium, Albuterol and Kayexalate.  Resolved

## 2020-07-25 NOTE — ASSESSMENT & PLAN NOTE
Cardiology consultation has been obtained for preoperative risk assessment prior to surgery.  Patient does have moderate OM2 disease.  Plans were to revascularized that prior to surgery.  I personally reviewed the coronary angiogram and it has moderate disease.  Unknown ischemic significance.  Patient can not walk around much because of the left foot.  We will look at the ischemic significance of this lesion with the help of a stress test.  So that appropriate risk assessment can be made prior to vascular surgery.

## 2020-07-25 NOTE — PROGRESS NOTES
Sterile dressing change.  Pt tolerated well.  No signs of redness, swelling, hematoma at insertion site.  Biopatch placed with transparent tegaderm.

## 2020-07-25 NOTE — PROGRESS NOTES
[facility] Hospital Medicine  Progress Note    Patient Name: Suyapa Connelly  MRN: [id number]  Patient Class: IP- Inpatient   Admission Date: 7/17/2020  Length of Stay: 8 days  Attending Physician: Najma Alfred MD  Primary Care Provider: Erlinda Rahman NP        Subjective:     Principal Problem:Acute osteomyelitis of left calcaneus        HPI:  Suyapa Connelly is a 54 y.o. female that (in part)  has a past medical history of Anxiety, Depression, Diabetes mellitus, GERD (gastroesophageal reflux disease), Hyperlipemia, Hypertension, and Myocardial infarction. Patient has a past surgical history that includes Angiogram - Right Extremity (Right, 7/9/15); angiogram-left leg (10/6/15); Catheterization of both left and right heart (N/A, 12/18/2019); Coronary angiography (N/A, 12/18/2019); Coronary Artery Bypass Graft (CABG) (N/A, 1/14/2020); and Insertion of tunneled central venous hemodialysis catheter (N/A, 1/27/2020). Presents to [facility] Emergency Department complaining of the left worsening heel foot wound. Poor wound healing.  Patient became concerned due to malodorous drainage.  Pain with ambulation and weight-bearing.  Associated hyperglycemia.  Denies fever chills.    In the emergency department physical exam concerning for osteomyelitis secondary to diabetic foot ulcer in the setting of uncontrolled diabetes.  Routine laboratory studies revealed hyperglycemia.  X-ray of the foot performed.  Concerning for osteomyelitis.  MRI confirmed this.  Podiatry was consulted.  Broad-spectrum antibiotics were initiated in the ED a special consideration given to risk of Pseudomonas infection.    Hospital medicine has been asked to admit to inpatient for further evaluation and treatment.     Overview/Hospital Course:  Kyaw Connelly is a 54 y.o. female that (in part)  has a past medical history of Anxiety, Depression, Diabetes mellitus, GERD (gastroesophageal reflux  disease), Hyperlipemia, Hypertension, and Myocardial infarction.  has a past surgical history that includes Angiogram - Right Extremity (Right, 7/9/15); angiogram-left leg (10/6/15); Catheterization of both left and right heart (N/A, 12/18/2019); Coronary angiography (N/A, 12/18/2019); Coronary Artery Bypass Graft (CABG) (N/A, 1/14/2020); and Insertion of tunneled central venous hemodialysis catheter (N/A, 1/27/2020). Presents to [facility] Emergency Department complaining of the left heel foot wound.  MRI confirmed osteo. Podiatry, ID, and vascular were consulted.  Podiatry discussed options with patient. She has chosen 6 weeks of IV abx. ID has been consulted to tailor abx. She has been continued on Aztreonam.  Blood cultures were NG. Bone bx on 7/18 has grown Enterococcus faecalis, Grp B Strep, and Enterobacter cloacae. Vascular Surgery consulted and did an angiogram which revealed adequate artery for access the left SFA and AK popliteal artery occlusion.  Patient with renal failure and Nephrology consulted.  Renal function continues to improve and ok to proceed with vascular intervention.  Podiatry planning debridement post vascular procedure.  Creatinine peaked at 2.4 and is currently 1.2.      Interval History:  Patient has no complaints this a.m.    Review of Systems   All other systems reviewed and are negative.    Objective:     Vital Signs (Most Recent):  Temp: 98.1 °F (36.7 °C) (07/25/20 0750)  Pulse: 93 (07/25/20 0750)  Resp: 16 (07/25/20 0750)  BP: (!) 154/73 (07/25/20 0750)  SpO2: 100 % (07/25/20 0750) Vital Signs (24h Range):  Temp:  [97.3 °F (36.3 °C)-98.6 °F (37 °C)] 98.1 °F (36.7 °C)  Pulse:  [79-98] 93  Resp:  [16-20] 16  SpO2:  [94 %-100 %] 100 %  BP: (112-164)/(59-88) 154/73     Weight: 87.1 kg (192 lb)  Body mass index is 31.95 kg/m².    Intake/Output Summary (Last 24 hours) at 7/25/2020 0950  Last data filed at 7/25/2020 0100  Gross per 24 hour   Intake --   Output 1000  ml   Net -1000 ml      Physical Exam  Vitals signs and nursing note reviewed.   Constitutional:       General: She is not in acute distress.     Appearance: She is well-developed. She is obese.   HENT:      Head: Normocephalic and atraumatic.      Right Ear: Hearing and external ear normal.      Left Ear: Hearing and external ear normal.      Nose: Nose normal.      Mouth/Throat:      Mouth: Mucous membranes are moist.      Pharynx: Oropharynx is clear. Uvula midline. No oropharyngeal exudate.   Eyes:      Conjunctiva/sclera: Conjunctivae normal.      Pupils: Pupils are equal, round, and reactive to light.   Neck:      Musculoskeletal: Normal range of motion and neck supple.      Thyroid: No thyromegaly.      Trachea: No tracheal deviation.   Cardiovascular:      Rate and Rhythm: Normal rate and regular rhythm.      Pulses:           Left popliteal pulse not accessible.        Dorsalis pedis pulses are 1+ on the right side.      Heart sounds: Normal heart sounds. No murmur.   Pulmonary:      Effort: Pulmonary effort is normal. No respiratory distress.      Breath sounds: Normal breath sounds.   Abdominal:      General: Bowel sounds are normal.      Palpations: Abdomen is soft.      Tenderness: There is no abdominal tenderness.   Musculoskeletal: Normal range of motion.         General: No tenderness.      Comments: Left foot wrapped with gauze dressing.  Unable to palpate pulses.   Feet:      Comments: Left foot bandage CDI  Skin:     General: Skin is warm and dry.      Coloration: Skin is not pale.   Neurological:      General: No focal deficit present.      Mental Status: She is alert and oriented to person, place, and time. Mental status is at baseline.   Psychiatric:         Attention and Perception: Attention normal.         Mood and Affect: Mood normal.         Speech: Speech normal.       Significant Labs:   CBC:   Recent Labs   Lab 07/25/20  0102   WBC 7.64   HGB 8.6*   HCT 27.3*        CMP:   Recent  Labs   Lab 07/24/20  0511 07/25/20  0530   * 136   K 4.3 4.3    105   CO2 21* 26   * 206*   BUN 29* 22*   CREATININE 1.2 1.2   CALCIUM 8.1* 8.3*   ANIONGAP 7* 5*   EGFRNONAA 52* 52*       Significant Imaging: I have reviewed and interpreted all pertinent imaging results/findings within the past 24 hours.      Assessment/Plan:      * Acute osteomyelitis of left calcaneus  - MRI confirms osteo. Bone Bx- 7/18  - Afebrile and with resolved leukocytosis   - Wound cultures with Enterococcus, E cloacae, and GBS.   - ID consulted for ABx recommendations and will d/c axtreoman and start cefepime 2g q12h; continue vancomycin   - Vascular Surgery consulted and planning angiogram 7/24/2020.  - Patient declines amputation and wants to try conservative tx with  IV ABx's.   - CKD and Nephrology consulted.  Vascular discussed case with Nephrology and patient cleared from renal for vascular intervention.    - Podiatry planning debridement post vascular procedure possibly 7/27/2020.           Hyponatremia  - resolved  - Monitor with daily labs      Stage 3 chronic kidney disease  Appreciate Nephrology input.  Patient did have acute on chronic CKD 3 which has now resolved  Hyperkalemia on 7/21.  Treated with Calcium, Albuterol and Kayexalate.  Resolved         Non healing left heel wound  Continue current wound care  Provide adequate analgesia prior to daily wound care      S/P CABG x 1  No acute issues       Mixed hyperlipidemia  Stable      Chronic combined systolic and diastolic heart failure  No acute issues   EF 50% on recent echo         Essential hypertension  Continue current management.  Adequate control      Type 2 diabetes mellitus without complication, with long-term current use of insulin  Uncontrolled with hyperglycemia. Manifestations likely of CKD 3      - Will check an A1c- > 14.  - Glucose increased secondary to steroids.  - Added insulin with meals and will increase prandial insulin to 6U as blood  glucose is increasing.           Peripheral artery disease  Patient requires revascularization of the left lower extremity        VTE Risk Mitigation (From admission, onward)         Ordered     heparin (porcine) injection 5,000 Units  Every 8 hours      07/17/20 2217     IP VTE HIGH RISK PATIENT  Once      07/17/20 2217     Place sequential compression device  Until discontinued      07/17/20 2217     Place HAILEY hose  Until discontinued      07/17/20 2217                      Najma Alfred MD  Department of Hospital Medicine   [facility]

## 2020-07-25 NOTE — PLAN OF CARE
Problem: Wound  Goal: Optimal Wound Healing  Intervention: Promote Effective Wound Healing  Flowsheets (Taken 7/25/2020 6542)  Oral Nutrition Promotion: rest periods promoted  Pain Management Interventions:   pain management plan reviewed with patient/caregiver   pillow support provided   position adjusted   relaxation techniques promoted

## 2020-07-25 NOTE — NURSING
Report received from off going nurse, DEANGELO Robbins. Patient resting with eyes closed. Rise and fall of chest noted. No signs of distress noted. Call light in reach. Bed low and locked. Will continue to monitor.

## 2020-07-25 NOTE — PROGRESS NOTES
Pharmacokinetic Assessment Follow Up: IV Vancomycin    Vancomycin serum concentration assessment(s):    The random level was drawn correctly and can be used to guide therapy at this time. The measurement is within the desired definitive target range of 10 to 20 mcg/mL.    Vancomycin Regimen Plan:    1000mg dose given today at  1137.  Scr Improved.  Timed trough 24 hours from dose given.  Plan to adjust frequency pending following vanc level.    Drug levels (last 3 results):  Recent Labs   Lab Result Units 07/23/20 0419 07/24/20  0510 07/25/20  0530   Vancomycin, Random ug/mL 20.7 14.9 12.0       Pharmacy will continue to follow and monitor vancomycin.    Please contact pharmacy at extension [phone] for questions regarding this assessment.    Thank you for the consult,   Estuardo Mora       Patient brief summary:  Suyapa Connelly is a 54 y.o. female initiated on antimicrobial therapy with IV Vancomycin for treatment of skin & soft tissue infection    The patient's current regimen is Pulse dosing guided by daily levels    Drug Allergies:   Review of patient's allergies indicates:   Allergen Reactions    Ciprofloxacin      Itchiness    Contrast media      Kidney injury    Pcn [penicillins]      Rash; tolerated ceftriaxone on 1/13/20       Actual Body Weight:   Wt Readings from Last 1 Encounters:   07/24/20 87.1 kg (192 lb)       Renal Function:   Estimated Creatinine Clearance: 59.1 mL/min (based on SCr of 1.2 mg/dL).,     Dialysis Method (if applicable):  N/A    CBC (last 72 hours):  Recent Labs   Lab Result Units 07/23/20 0419 07/25/20  0102   WBC K/uL 8.51 7.64   Hemoglobin g/dL 9.4* 8.6*   Hematocrit % 29.8* 27.3*   Platelets K/uL 358* 313   Gran% % 59.0 68.7   Lymph% % 30.4 19.9   Mono% % 8.7 9.6   Eosinophil% % 1.1 1.2   Basophil% % 0.4 0.3   Differential Method  Automated Automated       Metabolic Panel (last 72 hours):  Recent Labs   Lab Result Units 07/23/20 0419 07/24/20  0511 07/25/20  0530   Sodium  mmol/L 137 135* 136   Potassium mmol/L 4.1 4.3 4.3   Chloride mmol/L 106 107 105   CO2 mmol/L 23 21* 26   Glucose mg/dL 133* 146* 206*   BUN, Bld mg/dL 43* 29* 22*   Creatinine mg/dL 1.5* 1.2 1.2   Magnesium mg/dL  --  1.8 1.8   Phosphorus mg/dL 2.3* 2.8 2.7       Vancomycin Administrations:  vancomycin given in the last 96 hours                     vancomycin in dextrose 5 % 1 gram/250 mL IVPB 1,000 mg (mg) 1,000 mg New Bag 07/25/20 1137    vancomycin 750 mg in dextrose 5 % 250 mL IVPB (ready to mix system) (mg) 750 mg New Bag 07/24/20 1033    vancomycin 500 mg in dextrose 5 % 100 mL IVPB (ready to mix system) (mg) 500 mg New Bag 07/23/20 0936    vancomycin 750 mg in dextrose 5 % 250 mL IVPB (ready to mix system) (mg) 750 mg New Bag 07/22/20 1245                    Microbiologic Results:  Microbiology Results (last 7 days)       Procedure Component Value Units Date/Time    Blood Culture #2 **CANNOT BE ORDERED STAT** [[id number]] Collected: 07/17/20 1712    Order Status: Completed Specimen: Blood from Peripheral, Hand, Left Updated: 07/22/20 1903     Blood Culture, Routine No growth after 5 days.    Blood Culture #1 **CANNOT BE ORDERED STAT** [[id number]] Collected: 07/17/20 1631    Order Status: Completed Specimen: Blood from Peripheral, Antecubital, Right Updated: 07/22/20 1903     Blood Culture, Routine No growth after 5 days.    Aerobic culture [[id number]]  (Abnormal)  (Susceptibility) Collected: 07/18/20 0929    Order Status: Completed Specimen: Bone from Foot, Left Updated: 07/21/20 0745     Aerobic Bacterial Culture ENTEROCOCCUS FAECALIS  Moderate        STREPTOCOCCUS AGALACTIAE (GROUP B)  Few  Beta-hemolytic streptococci are routinely susceptible to   penicillins,cephalosporins and carbapenems.        ENTEROBACTER CLOACAE  Rare      Gram stain [[id number]] Collected: 07/18/20 0929    Order Status: Completed Specimen: Bone from Foot, Left Updated: 07/19/20 0346     Gram Stain Result No WBC's      No organisms  seen

## 2020-07-25 NOTE — SUBJECTIVE & OBJECTIVE
Past Medical History:   Diagnosis Date    Anxiety     Depression     Diabetes mellitus     type 2    GERD (gastroesophageal reflux disease)     Hyperlipemia     Hypertension     Myocardial infarction 2010    minor-caused by stress per pt.       Past Surgical History:   Procedure Laterality Date    Angiogram - Right Extremity Right 7/9/15    angiogram-left leg  10/6/15    CATHETERIZATION OF BOTH LEFT AND RIGHT HEART N/A 12/18/2019    Procedure: CATHETERIZATION, HEART, BOTH LEFT AND RIGHT;  Surgeon: Que Fernando III, MD;  Location: [facility] CATH LAB;  Service: Cardiology;  Laterality: N/A;    CORONARY ANGIOGRAPHY N/A 12/18/2019    Procedure: ANGIOGRAM, CORONARY ARTERY;  Surgeon: Que Fernando III, MD;  Location: [facility] CATH LAB;  Service: Cardiology;  Laterality: N/A;    CORONARY ARTERY BYPASS GRAFT (CABG) N/A 1/14/2020    Procedure: CORONARY ARTERY BYPASS GRAFT (CABG) x 1     Off Pump;  Surgeon: Huang Altamirano MD;  Location: [facility] OR [address] [place] [address];  Service: Cardiovascular;  Laterality: N/A;    INSERTION OF TUNNELED CENTRAL VENOUS HEMODIALYSIS CATHETER N/A 1/27/2020    Procedure: Insertion, Catheter, Central Venous, Hemodialysis;  Surgeon: ESTEBAN Gomez III, MD;  Location: [facility] CATH LAB;  Service: Peripheral Vascular;  Laterality: N/A;       Review of patient's allergies indicates:   Allergen Reactions    Ciprofloxacin      Itchiness    Contrast media      Kidney injury    Pcn [penicillins]      Rash; tolerated ceftriaxone on 1/13/20       No current facility-administered medications on file prior to encounter.      Current Outpatient Medications on File Prior to Encounter   Medication Sig    acetaminophen (TYLENOL) 500 MG tablet Take 1,000 mg by mouth daily as needed for Pain.    atorvastatin (LIPITOR) 80 MG tablet Take 1 tablet (80 mg total) by mouth once daily.    carvediloL (COREG) 6.25 MG tablet Take 6.25 mg by mouth once daily. Pt thinks she take BID. Unsure.    clopidogreL (PLAVIX) 75  mg tablet Take 1 tablet (75 mg total) by mouth once daily.    escitalopram oxalate (LEXAPRO) 10 MG tablet Take 1 tablet (10 mg total) by mouth once daily.    gabapentin (NEURONTIN) 300 MG capsule Take 1 capsule (300 mg total) by mouth 3 (three) times daily.    insulin aspart U-100 (NOVOLOG) 100 unit/mL injection Inject 9 Units into the skin 3 (three) times daily with meals.    insulin glargine 100 units/mL (3mL) SubQ pen Inject 10 Units into the skin every evening. (Patient taking differently: Inject 10 Units into the skin every evening. Lantus)    multivitamin (THERAGRAN) per tablet Take 1 tablet by mouth once daily.    ondansetron (ZOFRAN-ODT) 4 MG TbDL Take 1 tablet (4 mg total) by mouth every 12 (twelve) hours as needed (vomiting).    sevelamer carbonate (RENVELA) 800 mg Tab Take 1 tablet (800 mg total) by mouth 3 (three) times daily with meals.    torsemide (DEMADEX) 20 MG Tab     amiodarone (PACERONE) 400 MG tablet Take 1 tablet (400 mg total) by mouth once daily.    aspirin 81 MG Chew Take 1 tablet (81 mg total) by mouth once daily.    blood sugar diagnostic (ONETOUCH ULTRA BLUE TEST STRIP) Strp Use to test blood glucose twice daily (Patient taking differently: Tests 3 times daily)    blood-glucose meter Misc Use to test blood glucose    lancets 30 gauge Misc use to test blood glucose 2 (two) times daily with meals.    [DISCONTINUED] lancing device Misc 1 Device by Misc.(Non-Drug; Combo Route) route 2 (two) times daily with meals.     Family History     None        Tobacco Use    Smoking status: Never Smoker    Smokeless tobacco: Never Used   Substance and Sexual Activity    Alcohol use: No    Drug use: Yes     Types: Marijuana     Comment: used yesterday    Sexual activity: Not on file     Review of Systems   Constitution: Negative.   HENT: Negative.    Eyes: Negative.    Cardiovascular: Positive for claudication.   Respiratory: Negative.    Endocrine: Negative.    Hematologic/Lymphatic:  Negative.    Skin: Positive for poor wound healing.   Musculoskeletal: Negative.    Gastrointestinal: Negative.    Genitourinary: Negative.    Neurological: Negative.    Psychiatric/Behavioral: Negative.    Allergic/Immunologic: Negative.      Objective:     Vital Signs (Most Recent):  Temp: 98.1 °F (36.7 °C) (07/25/20 1103)  Pulse: 85 (07/25/20 1103)  Resp: 16 (07/25/20 1103)  BP: 137/72 (07/25/20 1103)  SpO2: 99 % (07/25/20 1103) Vital Signs (24h Range):  Temp:  [97.3 °F (36.3 °C)-98.1 °F (36.7 °C)] 98.1 °F (36.7 °C)  Pulse:  [79-98] 85  Resp:  [16-20] 16  SpO2:  [94 %-100 %] 99 %  BP: (112-164)/(59-88) 137/72     Weight: 87.1 kg (192 lb)  Body mass index is 31.95 kg/m².    SpO2: 99 %  O2 Device (Oxygen Therapy): room air      Intake/Output Summary (Last 24 hours) at 7/25/2020 1305  Last data filed at 7/25/2020 0100  Gross per 24 hour   Intake --   Output 200 ml   Net -200 ml       Lines/Drains/Airways     Peripheral Intravenous Line                 Midline Catheter Insertion/Assessment  - Single Lumen 07/18/20 1640 basilic vein (medial side of arm) 18g x 10cm 6 days                Physical Exam   Constitutional: She is oriented to person, place, and time. She appears well-developed and well-nourished.   HENT:   Head: Normocephalic.   Eyes: Pupils are equal, round, and reactive to light.   Neck: Normal range of motion. Neck supple.   Cardiovascular: Normal rate and regular rhythm.   Pulmonary/Chest: Effort normal and breath sounds normal.   Abdominal: Soft. Normal appearance and bowel sounds are normal. There is no abdominal tenderness.   Musculoskeletal:      Comments: Left lower extremity in wound care dressing   Neurological: She is alert and oriented to person, place, and time.   Skin: Skin is warm.   Psychiatric: She has a normal mood and affect.   Nursing note and vitals reviewed.      Significant Labs:   BMP:   Recent Labs   Lab 07/24/20  0511 07/25/20  0530   * 206*   * 136   K 4.3 4.3   CL  107 105   CO2 21* 26   BUN 29* 22*   CREATININE 1.2 1.2   CALCIUM 8.1* 8.3*   MG 1.8 1.8   , CMP   Recent Labs   Lab 07/24/20  0511 07/25/20  0530   * 136   K 4.3 4.3    105   CO2 21* 26   * 206*   BUN 29* 22*   CREATININE 1.2 1.2   CALCIUM 8.1* 8.3*   ANIONGAP 7* 5*   ESTGFRAFRICA 60 60   EGFRNONAA 52* 52*   , CBC   Recent Labs   Lab 07/25/20  0102   WBC 7.64   HGB 8.6*   HCT 27.3*      , INR No results for input(s): INR, PROTIME in the last 48 hours., Lipid Panel No results for input(s): CHOL, HDL, LDLCALC, TRIG, CHOLHDL in the last 48 hours., Troponin No results for input(s): TROPONINI in the last 48 hours. and All pertinent lab results from the last 24 hours have been reviewed.    Significant Imaging: Echocardiogram:   Transthoracic echo (TTE) complete (Cupid Only):   Results for orders placed or performed during the hospital encounter of 07/17/20   Echo Color Flow Doppler? Yes   Result Value Ref Range    Ascending aorta 2.65 cm    STJ 2.48 cm    AV mean gradient 5 mmHg    Ao peak ty 1.43 m/s    Ao VTI 37.05 cm    IVRT 121.11 msec    IVS 1.39 (A) 0.6 - 1.1 cm    LA size 3.69 cm    Left Atrium Major Axis 5.22 cm    Left Atrium Minor Axis 5.09 cm    LVIDD 4.34 3.5 - 6.0 cm    LVIDS 3.32 2.1 - 4.0 cm    LVOT diameter 2.08 cm    LVOT peak VTI 25.57 cm    PW 1.36 (A) 0.6 - 1.1 cm    MV Peak A Ty 1.05 m/s    E wave decelartion time 205.80 msec    MV Peak E Ty 0.76 m/s    PV Peak D Ty 0.28 m/s    PV Peak S Ty 0.60 m/s    RA Major Axis 5.20 cm    RA Width 3.92 cm    RVDD 3.73 cm    Sinus 2.72 cm    TAPSE 1.40 cm    TR Max Ty 2.90 m/s    LA WIDTH 4.49 cm    Ao root annulus 2.92 cm    AORTIC VALVE CUSP SEPERATION 1.49 cm    PV PEAK VELOCITY 0.88 cm/s    LV Diastolic Volume 84.78 mL    LV Systolic Volume 44.71 mL    RV S' 10.71 cm/s    LVOT peak ty 0.99 m/s    FS 24 %    LA volume 72.59 cm3    LV mass 229.11 g    Left Ventricle Relative Wall Thickness 0.63 cm    AV valve area 2.34 cm2    AV  Velocity Ratio 0.69     AV index (prosthetic) 0.69     E/A ratio 0.72     Pulm vein S/D ratio 2.14     LVOT area 3.4 cm2    LVOT stroke volume 86.84 cm3    AV peak gradient 8 mmHg    LV Systolic Volume Index 23.0 mL/m2    LV Diastolic Volume Index 43.61 mL/m2    LA Volume Index 37.3 mL/m2    LV Mass Index 118 g/m2    Triscuspid Valve Regurgitation Peak Gradient 34 mmHg    BSA 2 m2    Right Atrial Pressure (from IVC) 3 mmHg    TV rest pulmonary artery pressure 37 mmHg    Narrative    · Low normal left ventricular systolic function. The estimated ejection   fraction is 50%.  · Concentric left ventricular hypertrophy.  · Indeterminate left ventricular diastolic function.  · Normal right ventricular systolic function.  · Mild left atrial enlargement.  · Mild aortic regurgitation.  · Mild mitral regurgitation.  · Mild tricuspid regurgitation.  · Normal central venous pressure (3 mmHg).  · The estimated PA systolic pressure is 37 mmHg.

## 2020-07-25 NOTE — PROGRESS NOTES
Received report from JEREMÍAS tidwell. Patient AAOx4, resting quietly in bed, telemetry monitoring in place, no distress noted. Per report given by day shift nurse, Sophie, patient s/p angiogram & must lay flat until 2030, per MD order. Will continue to monitor. Safety maintained, call light in reach.

## 2020-07-25 NOTE — SUBJECTIVE & OBJECTIVE
Interval History:  Patient has no complaints this a.m.    Review of Systems   All other systems reviewed and are negative.    Objective:     Vital Signs (Most Recent):  Temp: 98.1 °F (36.7 °C) (07/25/20 0750)  Pulse: 93 (07/25/20 0750)  Resp: 16 (07/25/20 0750)  BP: (!) 154/73 (07/25/20 0750)  SpO2: 100 % (07/25/20 0750) Vital Signs (24h Range):  Temp:  [97.3 °F (36.3 °C)-98.6 °F (37 °C)] 98.1 °F (36.7 °C)  Pulse:  [79-98] 93  Resp:  [16-20] 16  SpO2:  [94 %-100 %] 100 %  BP: (112-164)/(59-88) 154/73     Weight: 87.1 kg (192 lb)  Body mass index is 31.95 kg/m².    Intake/Output Summary (Last 24 hours) at 7/25/2020 0950  Last data filed at 7/25/2020 0100  Gross per 24 hour   Intake --   Output 1000 ml   Net -1000 ml      Physical Exam  Vitals signs and nursing note reviewed.   Constitutional:       General: She is not in acute distress.     Appearance: She is well-developed. She is obese.   HENT:      Head: Normocephalic and atraumatic.      Right Ear: Hearing and external ear normal.      Left Ear: Hearing and external ear normal.      Nose: Nose normal.      Mouth/Throat:      Mouth: Mucous membranes are moist.      Pharynx: Oropharynx is clear. Uvula midline. No oropharyngeal exudate.   Eyes:      Conjunctiva/sclera: Conjunctivae normal.      Pupils: Pupils are equal, round, and reactive to light.   Neck:      Musculoskeletal: Normal range of motion and neck supple.      Thyroid: No thyromegaly.      Trachea: No tracheal deviation.   Cardiovascular:      Rate and Rhythm: Normal rate and regular rhythm.      Pulses:           Left popliteal pulse not accessible.        Dorsalis pedis pulses are 1+ on the right side.      Heart sounds: Normal heart sounds. No murmur.   Pulmonary:      Effort: Pulmonary effort is normal. No respiratory distress.      Breath sounds: Normal breath sounds.   Abdominal:      General: Bowel sounds are normal.      Palpations: Abdomen is soft.      Tenderness: There is no abdominal  tenderness.   Musculoskeletal: Normal range of motion.         General: No tenderness.      Comments: Left foot wrapped with gauze dressing.  Unable to palpate pulses.   Feet:      Comments: Left foot bandage CDI  Skin:     General: Skin is warm and dry.      Coloration: Skin is not pale.   Neurological:      General: No focal deficit present.      Mental Status: She is alert and oriented to person, place, and time. Mental status is at baseline.   Psychiatric:         Attention and Perception: Attention normal.         Mood and Affect: Mood normal.         Speech: Speech normal.       Significant Labs:   CBC:   Recent Labs   Lab 07/25/20  0102   WBC 7.64   HGB 8.6*   HCT 27.3*        CMP:   Recent Labs   Lab 07/24/20  0511 07/25/20  0530   * 136   K 4.3 4.3    105   CO2 21* 26   * 206*   BUN 29* 22*   CREATININE 1.2 1.2   CALCIUM 8.1* 8.3*   ANIONGAP 7* 5*   EGFRNONAA 52* 52*       Significant Imaging: I have reviewed and interpreted all pertinent imaging results/findings within the past 24 hours.

## 2020-07-25 NOTE — CONSULTS
[facility]  Cardiology  Consult Note    Patient Name: Suyapa Connelly  MRN: [id number]  Admission Date: 7/17/2020  Hospital Length of Stay: 8 days  Code Status: Full Code   Attending Provider: Najma Alfred MD   Consulting Provider: Rachel Easley MD  Primary Care Physician: Erlinda Rahman NP  Principal Problem:Acute osteomyelitis of left calcaneus    Patient information was obtained from patient and ER records.     Inpatient consult to Cardiology  Consult performed by: Rachel Easley MD  Consult ordered by: Teddy Huber MD        Subjective:     Chief Complaint:  Pre op     HPI:   Pt is a 53 yo F w/ PMH of HTN, HLD, DM2 w/ HgbA1C 9.7% 1/3/2020, CAD s/p 1v CABG LIMA to LAD 1/14/2020 and residual moderate stenosis of prox OM2, postop A. Fib on amiodarone, ICM w/ recovered LVEF 35-->50% and GIIDD, PAD s/p PTA w/ occluded SFA to Pop stents in her BLE 2015.  Cardiology consultation has been obtained for preoperative risk assessment prior to left lower extremity bypass surgery.  Patient states that since the bypass she has not experienced any further chest pains.  Her echocardiogram performed yesterday demonstrated improvement in heart function to around 50%.  She denies any chest pains at rest on exertion, orthopnea, PND.  I reviewed her coronary angiogram personally which was done prior to her bypass surgery.  She does residual moderate stenosis in OM2.  Her LAD has been bypassed with the LIMA.  There were plans to do PCI of the OM by Dr. Fernando, however because of acute kidney injury she was being managed medically.  Her main complaint is related to her left lower extremity which is being managed by vascular surgery.    Echo injuly 2020:  · Mildly decreased left ventricular systolic function. The estimated ejection fraction is 50%.  · Concentric left ventricular remodeling.  · Local segmental wall motion abnormalities.  · Septal wall has abnormal motion.  · Grade I (mild) left  ventricular diastolic dysfunction consistent with impaired relaxation.  · Normal right ventricular systolic function.  · Severe tricuspid regurgitation.  · Mechanically ventilated; cannot use inferior caval vein diameter to estimate central venous pressure.      Cardiac catheterization:    · Two vessel coronary artery disease with 90% stenosis of the mid LAD, 75% stenosis of the mid OM2, and 70% stenosis of the ostial D1.  · LV filling pressure is elevated, LVEDP 25 mmHg.  · Ischemic Cardiomyopathy  · Elevated right heart filling pressures due to elevated left heart pressures, mPAP 38 mmHg, PCWP 25 mmHg.              Hospitalist HPI:  Suyapa Connelly is a 54 y.o. female that (in part)  has a past medical history of Anxiety, Depression, Diabetes mellitus, GERD (gastroesophageal reflux disease), Hyperlipemia, Hypertension, and Myocardial infarction. Patient has a past surgical history that includes Angiogram - Right Extremity (Right, 7/9/15); angiogram-left leg (10/6/15); Catheterization of both left and right heart (N/A, 12/18/2019); Coronary angiography (N/A, 12/18/2019); Coronary Artery Bypass Graft (CABG) (N/A, 1/14/2020); and Insertion of tunneled central venous hemodialysis catheter (N/A, 1/27/2020). Presents to [facility] Emergency Department complaining of the left worsening heel foot wound. Poor wound healing.  Patient became concerned due to malodorous drainage.  Pain with ambulation and weight-bearing.  Associated hyperglycemia.  Denies fever chills.     In the emergency department physical exam concerning for osteomyelitis secondary to diabetic foot ulcer in the setting of uncontrolled diabetes.  Routine laboratory studies revealed hyperglycemia.  X-ray of the foot performed.  Concerning for osteomyelitis.  MRI confirmed this.  Podiatry was consulted.  Broad-spectrum antibiotics were initiated in the ED a special consideration given to risk of Pseudomonas infection.     [facility]  medicine has been asked to admit to inpatient for further evaluation and treatment.      Overview/Hospital Course:  Kyaw Connelly is a 54 y.o. female that (in part)  has a past medical history of Anxiety, Depression, Diabetes mellitus, GERD (gastroesophageal reflux disease), Hyperlipemia, Hypertension, and Myocardial infarction.  has a past surgical history that includes Angiogram - Right Extremity (Right, 7/9/15); angiogram-left leg (10/6/15); Catheterization of both left and right heart (N/A, 12/18/2019); Coronary angiography (N/A, 12/18/2019); Coronary Artery Bypass Graft (CABG) (N/A, 1/14/2020); and Insertion of tunneled central venous hemodialysis catheter (N/A, 1/27/2020). Presents to [facility] Emergency Department complaining of the left heel foot wound.  MRI confirmed osteo. Podiatry, ID, and vascular were consulted.  Podiatry discussed options with patient. She has chosen 6 weeks of IV abx. ID has been consulted to tailor abx. She has been continued on Aztreonam.  Blood cultures were NG. Bone bx on 7/18 has grown Enterococcus faecalis, Grp B Strep, and Enterobacter cloacae. Vascular Surgery consulted and did an angiogram which revealed adequate artery for access the left SFA and AK popliteal artery occlusion.  Patient with renal failure and Nephrology consulted.  Renal function continues to improve and ok to proceed with vascular intervention.  Podiatry planning debridement post vascular procedure.  Creatinine peaked at 2.4 and is currently 1.2.          Past Medical History:   Diagnosis Date    Anxiety     Depression     Diabetes mellitus     type 2    GERD (gastroesophageal reflux disease)     Hyperlipemia     Hypertension     Myocardial infarction 2010    minor-caused by stress per pt.       Past Surgical History:   Procedure Laterality Date    Angiogram - Right Extremity Right 7/9/15    angiogram-left leg  10/6/15    CATHETERIZATION OF BOTH LEFT AND RIGHT HEART N/A  12/18/2019    Procedure: CATHETERIZATION, HEART, BOTH LEFT AND RIGHT;  Surgeon: Que Fernando III, MD;  Location: [facility] CATH LAB;  Service: Cardiology;  Laterality: N/A;    CORONARY ANGIOGRAPHY N/A 12/18/2019    Procedure: ANGIOGRAM, CORONARY ARTERY;  Surgeon: Que Fernando III, MD;  Location: [facility] CATH LAB;  Service: Cardiology;  Laterality: N/A;    CORONARY ARTERY BYPASS GRAFT (CABG) N/A 1/14/2020    Procedure: CORONARY ARTERY BYPASS GRAFT (CABG) x 1     Off Pump;  Surgeon: Huang Altamirano MD;  Location: [facility] OR [address] [place] [address];  Service: Cardiovascular;  Laterality: N/A;    INSERTION OF TUNNELED CENTRAL VENOUS HEMODIALYSIS CATHETER N/A 1/27/2020    Procedure: Insertion, Catheter, Central Venous, Hemodialysis;  Surgeon: ESTEBAN Gomez III, MD;  Location: [facility] CATH LAB;  Service: Peripheral Vascular;  Laterality: N/A;       Review of patient's allergies indicates:   Allergen Reactions    Ciprofloxacin      Itchiness    Contrast media      Kidney injury    Pcn [penicillins]      Rash; tolerated ceftriaxone on 1/13/20       No current facility-administered medications on file prior to encounter.      Current Outpatient Medications on File Prior to Encounter   Medication Sig    acetaminophen (TYLENOL) 500 MG tablet Take 1,000 mg by mouth daily as needed for Pain.    atorvastatin (LIPITOR) 80 MG tablet Take 1 tablet (80 mg total) by mouth once daily.    carvediloL (COREG) 6.25 MG tablet Take 6.25 mg by mouth once daily. Pt thinks she take BID. Unsure.    clopidogreL (PLAVIX) 75 mg tablet Take 1 tablet (75 mg total) by mouth once daily.    escitalopram oxalate (LEXAPRO) 10 MG tablet Take 1 tablet (10 mg total) by mouth once daily.    gabapentin (NEURONTIN) 300 MG capsule Take 1 capsule (300 mg total) by mouth 3 (three) times daily.    insulin aspart U-100 (NOVOLOG) 100 unit/mL injection Inject 9 Units into the skin 3 (three) times daily with meals.    insulin glargine 100 units/mL (3mL) SubQ pen  Inject 10 Units into the skin every evening. (Patient taking differently: Inject 10 Units into the skin every evening. Lantus)    multivitamin (THERAGRAN) per tablet Take 1 tablet by mouth once daily.    ondansetron (ZOFRAN-ODT) 4 MG TbDL Take 1 tablet (4 mg total) by mouth every 12 (twelve) hours as needed (vomiting).    sevelamer carbonate (RENVELA) 800 mg Tab Take 1 tablet (800 mg total) by mouth 3 (three) times daily with meals.    torsemide (DEMADEX) 20 MG Tab     amiodarone (PACERONE) 400 MG tablet Take 1 tablet (400 mg total) by mouth once daily.    aspirin 81 MG Chew Take 1 tablet (81 mg total) by mouth once daily.    blood sugar diagnostic (ONETOUCH ULTRA BLUE TEST STRIP) Strp Use to test blood glucose twice daily (Patient taking differently: Tests 3 times daily)    blood-glucose meter Misc Use to test blood glucose    lancets 30 gauge Misc use to test blood glucose 2 (two) times daily with meals.    [DISCONTINUED] lancing device Misc 1 Device by Misc.(Non-Drug; Combo Route) route 2 (two) times daily with meals.     Family History     None        Tobacco Use    Smoking status: Never Smoker    Smokeless tobacco: Never Used   Substance and Sexual Activity    Alcohol use: No    Drug use: Yes     Types: Marijuana     Comment: used yesterday    Sexual activity: Not on file     Review of Systems   Constitution: Negative.   HENT: Negative.    Eyes: Negative.    Cardiovascular: Positive for claudication.   Respiratory: Negative.    Endocrine: Negative.    Hematologic/Lymphatic: Negative.    Skin: Positive for poor wound healing.   Musculoskeletal: Negative.    Gastrointestinal: Negative.    Genitourinary: Negative.    Neurological: Negative.    Psychiatric/Behavioral: Negative.    Allergic/Immunologic: Negative.      Objective:     Vital Signs (Most Recent):  Temp: 98.1 °F (36.7 °C) (07/25/20 1103)  Pulse: 85 (07/25/20 1103)  Resp: 16 (07/25/20 1103)  BP: 137/72 (07/25/20 1103)  SpO2: 99 % (07/25/20  1103) Vital Signs (24h Range):  Temp:  [97.3 °F (36.3 °C)-98.1 °F (36.7 °C)] 98.1 °F (36.7 °C)  Pulse:  [79-98] 85  Resp:  [16-20] 16  SpO2:  [94 %-100 %] 99 %  BP: (112-164)/(59-88) 137/72     Weight: 87.1 kg (192 lb)  Body mass index is 31.95 kg/m².    SpO2: 99 %  O2 Device (Oxygen Therapy): room air      Intake/Output Summary (Last 24 hours) at 7/25/2020 1305  Last data filed at 7/25/2020 0100  Gross per 24 hour   Intake --   Output 200 ml   Net -200 ml       Lines/Drains/Airways     Peripheral Intravenous Line                 Midline Catheter Insertion/Assessment  - Single Lumen 07/18/20 1640 basilic vein (medial side of arm) 18g x 10cm 6 days                Physical Exam   Constitutional: She is oriented to person, place, and time. She appears well-developed and well-nourished.   HENT:   Head: Normocephalic.   Eyes: Pupils are equal, round, and reactive to light.   Neck: Normal range of motion. Neck supple.   Cardiovascular: Normal rate and regular rhythm.   Pulmonary/Chest: Effort normal and breath sounds normal.   Abdominal: Soft. Normal appearance and bowel sounds are normal. There is no abdominal tenderness.   Musculoskeletal:      Comments: Left lower extremity in wound care dressing   Neurological: She is alert and oriented to person, place, and time.   Skin: Skin is warm.   Psychiatric: She has a normal mood and affect.   Nursing note and vitals reviewed.      Significant Labs:   BMP:   Recent Labs   Lab 07/24/20  0511 07/25/20  0530   * 206*   * 136   K 4.3 4.3    105   CO2 21* 26   BUN 29* 22*   CREATININE 1.2 1.2   CALCIUM 8.1* 8.3*   MG 1.8 1.8   , CMP   Recent Labs   Lab 07/24/20  0511 07/25/20  0530   * 136   K 4.3 4.3    105   CO2 21* 26   * 206*   BUN 29* 22*   CREATININE 1.2 1.2   CALCIUM 8.1* 8.3*   ANIONGAP 7* 5*   ESTGFRAFRICA 60 60   EGFRNONAA 52* 52*   , CBC   Recent Labs   Lab 07/25/20  0102   WBC 7.64   HGB 8.6*   HCT 27.3*      , INR No  results for input(s): INR, PROTIME in the last 48 hours., Lipid Panel No results for input(s): CHOL, HDL, LDLCALC, TRIG, CHOLHDL in the last 48 hours., Troponin No results for input(s): TROPONINI in the last 48 hours. and All pertinent lab results from the last 24 hours have been reviewed.    Significant Imaging: Echocardiogram:   Transthoracic echo (TTE) complete (Cupid Only):   Results for orders placed or performed during the hospital encounter of 07/17/20   Echo Color Flow Doppler? Yes   Result Value Ref Range    Ascending aorta 2.65 cm    STJ 2.48 cm    AV mean gradient 5 mmHg    Ao peak ty 1.43 m/s    Ao VTI 37.05 cm    IVRT 121.11 msec    IVS 1.39 (A) 0.6 - 1.1 cm    LA size 3.69 cm    Left Atrium Major Axis 5.22 cm    Left Atrium Minor Axis 5.09 cm    LVIDD 4.34 3.5 - 6.0 cm    LVIDS 3.32 2.1 - 4.0 cm    LVOT diameter 2.08 cm    LVOT peak VTI 25.57 cm    PW 1.36 (A) 0.6 - 1.1 cm    MV Peak A Ty 1.05 m/s    E wave decelartion time 205.80 msec    MV Peak E Ty 0.76 m/s    PV Peak D Ty 0.28 m/s    PV Peak S Ty 0.60 m/s    RA Major Axis 5.20 cm    RA Width 3.92 cm    RVDD 3.73 cm    Sinus 2.72 cm    TAPSE 1.40 cm    TR Max Ty 2.90 m/s    LA WIDTH 4.49 cm    Ao root annulus 2.92 cm    AORTIC VALVE CUSP SEPERATION 1.49 cm    PV PEAK VELOCITY 0.88 cm/s    LV Diastolic Volume 84.78 mL    LV Systolic Volume 44.71 mL    RV S' 10.71 cm/s    LVOT peak ty 0.99 m/s    FS 24 %    LA volume 72.59 cm3    LV mass 229.11 g    Left Ventricle Relative Wall Thickness 0.63 cm    AV valve area 2.34 cm2    AV Velocity Ratio 0.69     AV index (prosthetic) 0.69     E/A ratio 0.72     Pulm vein S/D ratio 2.14     LVOT area 3.4 cm2    LVOT stroke volume 86.84 cm3    AV peak gradient 8 mmHg    LV Systolic Volume Index 23.0 mL/m2    LV Diastolic Volume Index 43.61 mL/m2    LA Volume Index 37.3 mL/m2    LV Mass Index 118 g/m2    Triscuspid Valve Regurgitation Peak Gradient 34 mmHg    BSA 2 m2    Right Atrial Pressure (from IVC) 3  mmHg    TV rest pulmonary artery pressure 37 mmHg    Narrative    · Low normal left ventricular systolic function. The estimated ejection   fraction is 50%.  · Concentric left ventricular hypertrophy.  · Indeterminate left ventricular diastolic function.  · Normal right ventricular systolic function.  · Mild left atrial enlargement.  · Mild aortic regurgitation.  · Mild mitral regurgitation.  · Mild tricuspid regurgitation.  · Normal central venous pressure (3 mmHg).  · The estimated PA systolic pressure is 37 mmHg.        Assessment and Plan:     Preop cardiovascular exam  Cardiology consultation has been obtained for preoperative risk assessment prior to surgery.  Patient does have moderate OM2 disease.  Plans were to revascularized that prior to surgery.  I personally reviewed the coronary angiogram and it has moderate disease.  Unknown ischemic significance.  Patient can not walk around much because of the left foot.  We will look at the ischemic significance of this lesion with the help of a stress test.  So that appropriate risk assessment can be made prior to vascular surgery.    Stage 3 chronic kidney disease  Improved    Non healing left heel wound  Management per vascular surgery and Podiatry    S/P CABG x 1  Status post LIMA to LAD.  Moderate disease in OM2.    Chronic combined systolic and diastolic heart failure  Currently euvolemic.  EF 50%.    Essential hypertension  Well controlled on current therapy.  Continue antihypertensives and titrate as needed.    Type 2 diabetes mellitus without complication, with long-term current use of insulin  Management per primary    Peripheral artery disease  Being managed by vascular surgery        VTE Risk Mitigation (From admission, onward)         Ordered     heparin (porcine) injection 5,000 Units  Every 8 hours      07/17/20 2217     IP VTE HIGH RISK PATIENT  Once      07/17/20 2217     Place sequential compression device  Until discontinued      07/17/20 2217      Place HAILEY hose  Until discontinued      07/17/20 4564                Thank you for your consult. I will follow-up with patient. Please contact us if you have any additional questions.    Rachel Easley MD  Cardiology   [facility]

## 2020-07-25 NOTE — ASSESSMENT & PLAN NOTE
Uncontrolled with hyperglycemia. Manifestations likely of CKD 3      - Will check an A1c- > 14.  - Glucose increased secondary to steroids.  - Added insulin with meals and will increase prandial insulin to 6U as blood glucose is increasing.

## 2020-07-26 LAB
ANION GAP SERPL CALC-SCNC: 4 MMOL/L (ref 8–16)
BUN SERPL-MCNC: 13 MG/DL (ref 6–20)
CALCIUM SERPL-MCNC: 8.7 MG/DL (ref 8.7–10.5)
CHLORIDE SERPL-SCNC: 104 MMOL/L (ref 95–110)
CO2 SERPL-SCNC: 28 MMOL/L (ref 23–29)
CREAT SERPL-MCNC: 1.2 MG/DL (ref 0.5–1.4)
EST. GFR  (AFRICAN AMERICAN): 60 ML/MIN/1.73 M^2
EST. GFR  (NON AFRICAN AMERICAN): 52 ML/MIN/1.73 M^2
GLUCOSE SERPL-MCNC: 186 MG/DL (ref 70–110)
MAGNESIUM SERPL-MCNC: 1.6 MG/DL (ref 1.6–2.6)
PHOSPHATE SERPL-MCNC: 2.4 MG/DL (ref 2.7–4.5)
POCT GLUCOSE: 143 MG/DL (ref 70–110)
POCT GLUCOSE: 202 MG/DL (ref 70–110)
POCT GLUCOSE: 237 MG/DL (ref 70–110)
POTASSIUM SERPL-SCNC: 4.2 MMOL/L (ref 3.5–5.1)
SODIUM SERPL-SCNC: 136 MMOL/L (ref 136–145)
VANCOMYCIN SERPL-MCNC: 12.5 UG/ML

## 2020-07-26 PROCEDURE — 85025 COMPLETE CBC W/AUTO DIFF WBC: CPT

## 2020-07-26 PROCEDURE — 25000003 PHARM REV CODE 250: Performed by: HOSPITALIST

## 2020-07-26 PROCEDURE — 25000003 PHARM REV CODE 250: Performed by: PHYSICIAN ASSISTANT

## 2020-07-26 PROCEDURE — 63600175 PHARM REV CODE 636 W HCPCS: Performed by: HOSPITALIST

## 2020-07-26 PROCEDURE — 99233 PR SUBSEQUENT HOSPITAL CARE,LEVL III: ICD-10-PCS | Mod: ,,, | Performed by: INTERNAL MEDICINE

## 2020-07-26 PROCEDURE — 25000003 PHARM REV CODE 250: Performed by: STUDENT IN AN ORGANIZED HEALTH CARE EDUCATION/TRAINING PROGRAM

## 2020-07-26 PROCEDURE — 25000003 PHARM REV CODE 250: Performed by: NURSE PRACTITIONER

## 2020-07-26 PROCEDURE — 84100 ASSAY OF PHOSPHORUS: CPT

## 2020-07-26 PROCEDURE — 63600175 PHARM REV CODE 636 W HCPCS: Performed by: NURSE PRACTITIONER

## 2020-07-26 PROCEDURE — 21400001 HC TELEMETRY ROOM

## 2020-07-26 PROCEDURE — 80202 ASSAY OF VANCOMYCIN: CPT

## 2020-07-26 PROCEDURE — 80048 BASIC METABOLIC PNL TOTAL CA: CPT

## 2020-07-26 PROCEDURE — 99233 SBSQ HOSP IP/OBS HIGH 50: CPT | Mod: ,,, | Performed by: INTERNAL MEDICINE

## 2020-07-26 PROCEDURE — 83735 ASSAY OF MAGNESIUM: CPT

## 2020-07-26 PROCEDURE — 36415 COLL VENOUS BLD VENIPUNCTURE: CPT

## 2020-07-26 PROCEDURE — 25000003 PHARM REV CODE 250: Performed by: INTERNAL MEDICINE

## 2020-07-26 RX ORDER — SODIUM,POTASSIUM PHOSPHATES 280-250MG
1 POWDER IN PACKET (EA) ORAL ONCE
Status: COMPLETED | OUTPATIENT
Start: 2020-07-26 | End: 2020-07-26

## 2020-07-26 RX ORDER — MAGNESIUM SULFATE 1 G/100ML
1 INJECTION INTRAVENOUS ONCE
Status: COMPLETED | OUTPATIENT
Start: 2020-07-26 | End: 2020-07-26

## 2020-07-26 RX ORDER — INSULIN ASPART 100 [IU]/ML
8 INJECTION, SOLUTION INTRAVENOUS; SUBCUTANEOUS
Status: DISCONTINUED | OUTPATIENT
Start: 2020-07-26 | End: 2020-07-29

## 2020-07-26 RX ADMIN — INSULIN DETEMIR 15 UNITS: 100 INJECTION, SOLUTION SUBCUTANEOUS at 08:07

## 2020-07-26 RX ADMIN — ASPIRIN 81 MG 81 MG: 81 TABLET ORAL at 08:07

## 2020-07-26 RX ADMIN — CARVEDILOL 6.25 MG: 6.25 TABLET, FILM COATED ORAL at 08:07

## 2020-07-26 RX ADMIN — GABAPENTIN 100 MG: 100 CAPSULE ORAL at 08:07

## 2020-07-26 RX ADMIN — INSULIN ASPART 2 UNITS: 100 INJECTION, SOLUTION INTRAVENOUS; SUBCUTANEOUS at 12:07

## 2020-07-26 RX ADMIN — CEFEPIME HYDROCHLORIDE 2 G: 2 INJECTION, SOLUTION INTRAVENOUS at 04:07

## 2020-07-26 RX ADMIN — SODIUM BICARBONATE TAB 325 MG 650 MG: 325 TAB at 08:07

## 2020-07-26 RX ADMIN — GABAPENTIN 100 MG: 100 CAPSULE ORAL at 02:07

## 2020-07-26 RX ADMIN — MAGNESIUM SULFATE 1 G: 1 INJECTION INTRAVENOUS at 03:07

## 2020-07-26 RX ADMIN — HEPARIN SODIUM 5000 UNITS: 5000 INJECTION INTRAVENOUS; SUBCUTANEOUS at 05:07

## 2020-07-26 RX ADMIN — AMIODARONE HYDROCHLORIDE 400 MG: 200 TABLET ORAL at 08:07

## 2020-07-26 RX ADMIN — MORPHINE SULFATE 2 MG: 10 INJECTION INTRAVENOUS at 09:07

## 2020-07-26 RX ADMIN — CLOPIDOGREL BISULFATE 75 MG: 75 TABLET, FILM COATED ORAL at 08:07

## 2020-07-26 RX ADMIN — HEPARIN SODIUM 5000 UNITS: 5000 INJECTION INTRAVENOUS; SUBCUTANEOUS at 10:07

## 2020-07-26 RX ADMIN — INSULIN ASPART 8 UNITS: 100 INJECTION, SOLUTION INTRAVENOUS; SUBCUTANEOUS at 12:07

## 2020-07-26 RX ADMIN — MORPHINE SULFATE 2 MG: 10 INJECTION INTRAVENOUS at 03:07

## 2020-07-26 RX ADMIN — POTASSIUM & SODIUM PHOSPHATES POWDER PACK 280-160-250 MG 1 PACKET: 280-160-250 PACK at 02:07

## 2020-07-26 RX ADMIN — MORPHINE SULFATE 2 MG: 10 INJECTION INTRAVENOUS at 08:07

## 2020-07-26 RX ADMIN — ONDANSETRON 8 MG: 2 INJECTION INTRAMUSCULAR; INTRAVENOUS at 09:07

## 2020-07-26 RX ADMIN — HEPARIN SODIUM 5000 UNITS: 5000 INJECTION INTRAVENOUS; SUBCUTANEOUS at 02:07

## 2020-07-26 RX ADMIN — CEFEPIME HYDROCHLORIDE 2 G: 2 INJECTION, SOLUTION INTRAVENOUS at 05:07

## 2020-07-26 RX ADMIN — Medication 6 MG: at 10:07

## 2020-07-26 RX ADMIN — ATORVASTATIN CALCIUM 80 MG: 40 TABLET, FILM COATED ORAL at 08:07

## 2020-07-26 RX ADMIN — PANTOPRAZOLE SODIUM 40 MG: 40 TABLET, DELAYED RELEASE ORAL at 08:07

## 2020-07-26 RX ADMIN — ALLOPURINOL 100 MG: 100 TABLET ORAL at 08:07

## 2020-07-26 RX ADMIN — Medication: at 08:07

## 2020-07-26 RX ADMIN — Medication 1250 MG: at 12:07

## 2020-07-26 RX ADMIN — INSULIN ASPART 8 UNITS: 100 INJECTION, SOLUTION INTRAVENOUS; SUBCUTANEOUS at 04:07

## 2020-07-26 NOTE — SUBJECTIVE & OBJECTIVE
Interval History:  Patient doing fine.  Main complaint is nausea and vomiting.  Denies any chest pains at rest on exertion, orthopnea, PND.    Review of Systems   Constitution: Negative.   HENT: Negative.    Eyes: Negative.    Cardiovascular: Positive for claudication.   Respiratory: Negative.    Endocrine: Negative.    Hematologic/Lymphatic: Negative.    Skin: Positive for poor wound healing.   Musculoskeletal: Negative.    Gastrointestinal: Positive for nausea and vomiting.   Genitourinary: Negative.    Neurological: Negative.    Psychiatric/Behavioral: Negative.    Allergic/Immunologic: Negative.      Objective:     Vital Signs (Most Recent):  Temp: 97.7 °F (36.5 °C) (07/26/20 1106)  Pulse: 84 (07/26/20 1106)  Resp: 17 (07/26/20 1106)  BP: (!) 147/72 (07/26/20 1106)  SpO2: 98 % (07/26/20 1106) Vital Signs (24h Range):  Temp:  [97.6 °F (36.4 °C)-98.5 °F (36.9 °C)] 97.7 °F (36.5 °C)  Pulse:  [84-95] 84  Resp:  [16-20] 17  SpO2:  [97 %-100 %] 98 %  BP: (137-160)/(66-86) 147/72     Weight: 87.1 kg (192 lb)  Body mass index is 31.95 kg/m².     SpO2: 98 %  O2 Device (Oxygen Therapy): room air      Intake/Output Summary (Last 24 hours) at 7/26/2020 1447  Last data filed at 7/26/2020 1238  Gross per 24 hour   Intake 200 ml   Output 200 ml   Net 0 ml       Lines/Drains/Airways     Peripheral Intravenous Line                 Midline Catheter Insertion/Assessment  - Single Lumen 07/18/20 1640 basilic vein (medial side of arm) 18g x 10cm 7 days                Physical Exam   Constitutional: She is oriented to person, place, and time. She appears well-developed and well-nourished.   HENT:   Head: Normocephalic.   Eyes: Pupils are equal, round, and reactive to light.   Neck: Normal range of motion. Neck supple.   Cardiovascular: Normal rate and regular rhythm.   Pulmonary/Chest: Effort normal and breath sounds normal.   Abdominal: Soft. Normal appearance and bowel sounds are normal. There is no abdominal tenderness.    Musculoskeletal:      Comments: Left lower extremity in wound care dressing   Neurological: She is alert and oriented to person, place, and time.   Skin: Skin is warm.   Psychiatric: She has a normal mood and affect.   Nursing note and vitals reviewed.      Significant Labs:   BMP:   Recent Labs   Lab 07/25/20  0530 07/26/20  0635 07/26/20  1224   *  --  186*     --  136   K 4.3  --  4.2     --  104   CO2 26  --  28   BUN 22*  --  13   CREATININE 1.2  --  1.2   CALCIUM 8.3*  --  8.7   MG 1.8 1.6  --    , CMP   Recent Labs   Lab 07/25/20  0530 07/26/20  1224    136   K 4.3 4.2    104   CO2 26 28   * 186*   BUN 22* 13   CREATININE 1.2 1.2   CALCIUM 8.3* 8.7   ANIONGAP 5* 4*   ESTGFRAFRICA 60 60   EGFRNONAA 52* 52*   , CBC   Recent Labs   Lab 07/25/20  0102   WBC 7.64   HGB 8.6*   HCT 27.3*      , INR No results for input(s): INR, PROTIME in the last 48 hours., Lipid Panel No results for input(s): CHOL, HDL, LDLCALC, TRIG, CHOLHDL in the last 48 hours., Troponin No results for input(s): TROPONINI in the last 48 hours. and All pertinent lab results from the last 24 hours have been reviewed.    Significant Imaging: Echocardiogram:   Transthoracic echo (TTE) complete (Cupid Only):   Results for orders placed or performed during the hospital encounter of 07/17/20   Echo Color Flow Doppler? Yes   Result Value Ref Range    Ascending aorta 2.65 cm    STJ 2.48 cm    AV mean gradient 5 mmHg    Ao peak ty 1.43 m/s    Ao VTI 37.05 cm    IVRT 121.11 msec    IVS 1.39 (A) 0.6 - 1.1 cm    LA size 3.69 cm    Left Atrium Major Axis 5.22 cm    Left Atrium Minor Axis 5.09 cm    LVIDD 4.34 3.5 - 6.0 cm    LVIDS 3.32 2.1 - 4.0 cm    LVOT diameter 2.08 cm    LVOT peak VTI 25.57 cm    PW 1.36 (A) 0.6 - 1.1 cm    MV Peak A Ty 1.05 m/s    E wave decelartion time 205.80 msec    MV Peak E Ty 0.76 m/s    PV Peak D Ty 0.28 m/s    PV Peak S Ty 0.60 m/s    RA Major Axis 5.20 cm    RA Width 3.92 cm     RVDD 3.73 cm    Sinus 2.72 cm    TAPSE 1.40 cm    TR Max Ty 2.90 m/s    LA WIDTH 4.49 cm    Ao root annulus 2.92 cm    AORTIC VALVE CUSP SEPERATION 1.49 cm    PV PEAK VELOCITY 0.88 cm/s    LV Diastolic Volume 84.78 mL    LV Systolic Volume 44.71 mL    RV S' 10.71 cm/s    LVOT peak ty 0.99 m/s    FS 24 %    LA volume 72.59 cm3    LV mass 229.11 g    Left Ventricle Relative Wall Thickness 0.63 cm    AV valve area 2.34 cm2    AV Velocity Ratio 0.69     AV index (prosthetic) 0.69     E/A ratio 0.72     Pulm vein S/D ratio 2.14     LVOT area 3.4 cm2    LVOT stroke volume 86.84 cm3    AV peak gradient 8 mmHg    LV Systolic Volume Index 23.0 mL/m2    LV Diastolic Volume Index 43.61 mL/m2    LA Volume Index 37.3 mL/m2    LV Mass Index 118 g/m2    Triscuspid Valve Regurgitation Peak Gradient 34 mmHg    BSA 2 m2    Right Atrial Pressure (from IVC) 3 mmHg    TV rest pulmonary artery pressure 37 mmHg    Narrative    · Low normal left ventricular systolic function. The estimated ejection   fraction is 50%.  · Concentric left ventricular hypertrophy.  · Indeterminate left ventricular diastolic function.  · Normal right ventricular systolic function.  · Mild left atrial enlargement.  · Mild aortic regurgitation.  · Mild mitral regurgitation.  · Mild tricuspid regurgitation.  · Normal central venous pressure (3 mmHg).  · The estimated PA systolic pressure is 37 mmHg.

## 2020-07-26 NOTE — NURSING
Bedside report received from JEREMÍAS Amin. Patient is awake and alert. Patient appears to be in no apparent distress. Safety maintained. Will continue to monitor.

## 2020-07-26 NOTE — NURSING
End of shift bedside report given to  DEANGELO Pike. Patient in no apparent distress.     12 hour chart check complete.

## 2020-07-26 NOTE — SUBJECTIVE & OBJECTIVE
Interval History:  Patient had an episode of emesis last p.m. and also this a.m. when attempting morning medications.  She denies preceding nausea or abdominal pain.    Review of Systems   Gastrointestinal: Positive for vomiting.     Objective:     Vital Signs (Most Recent):  Temp: 97.7 °F (36.5 °C) (07/26/20 1106)  Pulse: 84 (07/26/20 1106)  Resp: 17 (07/26/20 1106)  BP: (!) 147/72 (07/26/20 1106)  SpO2: 98 % (07/26/20 1106) Vital Signs (24h Range):  Temp:  [97.6 °F (36.4 °C)-98.5 °F (36.9 °C)] 97.7 °F (36.5 °C)  Pulse:  [84-95] 84  Resp:  [16-20] 17  SpO2:  [97 %-100 %] 98 %  BP: (137-160)/(66-86) 147/72     Weight: 87.1 kg (192 lb)  Body mass index is 31.95 kg/m².    Intake/Output Summary (Last 24 hours) at 7/26/2020 1305  Last data filed at 7/26/2020 1238  Gross per 24 hour   Intake 200 ml   Output 200 ml   Net 0 ml      Physical Exam  Vitals signs and nursing note reviewed.   Constitutional:       General: She is not in acute distress.     Appearance: She is well-developed. She is obese.   HENT:      Head: Normocephalic and atraumatic.      Right Ear: Hearing and external ear normal.      Left Ear: Hearing and external ear normal.      Nose: Nose normal.      Mouth/Throat:      Mouth: Mucous membranes are moist.      Pharynx: Oropharynx is clear. Uvula midline. No oropharyngeal exudate.   Eyes:      Conjunctiva/sclera: Conjunctivae normal.      Pupils: Pupils are equal, round, and reactive to light.   Neck:      Musculoskeletal: Normal range of motion and neck supple.      Thyroid: No thyromegaly.      Trachea: No tracheal deviation.   Cardiovascular:      Rate and Rhythm: Normal rate and regular rhythm.      Pulses:           Left popliteal pulse not accessible.        Dorsalis pedis pulses are 1+ on the right side.      Heart sounds: Normal heart sounds. No murmur.   Pulmonary:      Effort: Pulmonary effort is normal. No respiratory distress.      Breath sounds: Normal breath sounds.   Abdominal:       General: Bowel sounds are normal.      Palpations: Abdomen is soft.      Tenderness: There is no abdominal tenderness.   Musculoskeletal: Normal range of motion.         General: No tenderness.      Comments: Left foot wrapped with gauze dressing.  Unable to palpate pulses.   Feet:      Comments: Left foot bandage CDI  Skin:     General: Skin is warm and dry.      Coloration: Skin is not pale.   Neurological:      General: No focal deficit present.      Mental Status: She is alert and oriented to person, place, and time. Mental status is at baseline.   Psychiatric:         Attention and Perception: Attention normal.         Mood and Affect: Mood normal.         Speech: Speech normal.         Behavior: Behavior normal.         Thought Content: Thought content normal.         Judgment: Judgment normal.         Significant Labs:   CBC:   Recent Labs   Lab 07/25/20  0102   WBC 7.64   HGB 8.6*   HCT 27.3*        CMP:   Recent Labs   Lab 07/25/20  0530      K 4.3      CO2 26   *   BUN 22*   CREATININE 1.2   CALCIUM 8.3*   ANIONGAP 5*   EGFRNONAA 52*       Significant Imaging: I have reviewed and interpreted all pertinent imaging results/findings within the past 24 hours.

## 2020-07-26 NOTE — NURSING
Patient awake, alert, oriented resting comfortably. No signs of distress observed. bed low and locked. Call light in reach. Report given to oncoming nurse, DEANGELO Barker. 12hour chart check complete.

## 2020-07-26 NOTE — PROGRESS NOTES
[facility]  Cardiology  Progress Note    Patient Name: Suyapa Connelly  MRN: [id number]  Admission Date: 7/17/2020  Hospital Length of Stay: 9 days  Code Status: Full Code   Attending Physician: Najma Alfred MD   Primary Care Physician: Erlinda Rahman NP  Expected Discharge Date: 7/29/2020  Principal Problem:Acute osteomyelitis of left calcaneus    Subjective:       Interval History:  Patient doing fine.  Main complaint is nausea and vomiting.  Denies any chest pains at rest on exertion, orthopnea, PND.    Review of Systems   Constitution: Negative.   HENT: Negative.    Eyes: Negative.    Cardiovascular: Positive for claudication.   Respiratory: Negative.    Endocrine: Negative.    Hematologic/Lymphatic: Negative.    Skin: Positive for poor wound healing.   Musculoskeletal: Negative.    Gastrointestinal: Positive for nausea and vomiting.   Genitourinary: Negative.    Neurological: Negative.    Psychiatric/Behavioral: Negative.    Allergic/Immunologic: Negative.      Objective:     Vital Signs (Most Recent):  Temp: 97.7 °F (36.5 °C) (07/26/20 1106)  Pulse: 84 (07/26/20 1106)  Resp: 17 (07/26/20 1106)  BP: (!) 147/72 (07/26/20 1106)  SpO2: 98 % (07/26/20 1106) Vital Signs (24h Range):  Temp:  [97.6 °F (36.4 °C)-98.5 °F (36.9 °C)] 97.7 °F (36.5 °C)  Pulse:  [84-95] 84  Resp:  [16-20] 17  SpO2:  [97 %-100 %] 98 %  BP: (137-160)/(66-86) 147/72     Weight: 87.1 kg (192 lb)  Body mass index is 31.95 kg/m².     SpO2: 98 %  O2 Device (Oxygen Therapy): room air      Intake/Output Summary (Last 24 hours) at 7/26/2020 1447  Last data filed at 7/26/2020 1238  Gross per 24 hour   Intake 200 ml   Output 200 ml   Net 0 ml       Lines/Drains/Airways     Peripheral Intravenous Line                 Midline Catheter Insertion/Assessment  - Single Lumen 07/18/20 1640 basilic vein (medial side of arm) 18g x 10cm 7 days                Physical Exam   Constitutional: She is oriented to person, place, and time. She  appears well-developed and well-nourished.   HENT:   Head: Normocephalic.   Eyes: Pupils are equal, round, and reactive to light.   Neck: Normal range of motion. Neck supple.   Cardiovascular: Normal rate and regular rhythm.   Pulmonary/Chest: Effort normal and breath sounds normal.   Abdominal: Soft. Normal appearance and bowel sounds are normal. There is no abdominal tenderness.   Musculoskeletal:      Comments: Left lower extremity in wound care dressing   Neurological: She is alert and oriented to person, place, and time.   Skin: Skin is warm.   Psychiatric: She has a normal mood and affect.   Nursing note and vitals reviewed.      Significant Labs:   BMP:   Recent Labs   Lab 07/25/20  0530 07/26/20  0635 07/26/20  1224   *  --  186*     --  136   K 4.3  --  4.2     --  104   CO2 26  --  28   BUN 22*  --  13   CREATININE 1.2  --  1.2   CALCIUM 8.3*  --  8.7   MG 1.8 1.6  --    , CMP   Recent Labs   Lab 07/25/20  0530 07/26/20  1224    136   K 4.3 4.2    104   CO2 26 28   * 186*   BUN 22* 13   CREATININE 1.2 1.2   CALCIUM 8.3* 8.7   ANIONGAP 5* 4*   ESTGFRAFRICA 60 60   EGFRNONAA 52* 52*   , CBC   Recent Labs   Lab 07/25/20  0102   WBC 7.64   HGB 8.6*   HCT 27.3*      , INR No results for input(s): INR, PROTIME in the last 48 hours., Lipid Panel No results for input(s): CHOL, HDL, LDLCALC, TRIG, CHOLHDL in the last 48 hours., Troponin No results for input(s): TROPONINI in the last 48 hours. and All pertinent lab results from the last 24 hours have been reviewed.    Significant Imaging: Echocardiogram:   Transthoracic echo (TTE) complete (Cupid Only):   Results for orders placed or performed during the hospital encounter of 07/17/20   Echo Color Flow Doppler? Yes   Result Value Ref Range    Ascending aorta 2.65 cm    STJ 2.48 cm    AV mean gradient 5 mmHg    Ao peak niya 1.43 m/s    Ao VTI 37.05 cm    IVRT 121.11 msec    IVS 1.39 (A) 0.6 - 1.1 cm    LA size 3.69 cm     Left Atrium Major Axis 5.22 cm    Left Atrium Minor Axis 5.09 cm    LVIDD 4.34 3.5 - 6.0 cm    LVIDS 3.32 2.1 - 4.0 cm    LVOT diameter 2.08 cm    LVOT peak VTI 25.57 cm    PW 1.36 (A) 0.6 - 1.1 cm    MV Peak A Ty 1.05 m/s    E wave decelartion time 205.80 msec    MV Peak E Ty 0.76 m/s    PV Peak D Ty 0.28 m/s    PV Peak S Ty 0.60 m/s    RA Major Axis 5.20 cm    RA Width 3.92 cm    RVDD 3.73 cm    Sinus 2.72 cm    TAPSE 1.40 cm    TR Max Ty 2.90 m/s    LA WIDTH 4.49 cm    Ao root annulus 2.92 cm    AORTIC VALVE CUSP SEPERATION 1.49 cm    PV PEAK VELOCITY 0.88 cm/s    LV Diastolic Volume 84.78 mL    LV Systolic Volume 44.71 mL    RV S' 10.71 cm/s    LVOT peak ty 0.99 m/s    FS 24 %    LA volume 72.59 cm3    LV mass 229.11 g    Left Ventricle Relative Wall Thickness 0.63 cm    AV valve area 2.34 cm2    AV Velocity Ratio 0.69     AV index (prosthetic) 0.69     E/A ratio 0.72     Pulm vein S/D ratio 2.14     LVOT area 3.4 cm2    LVOT stroke volume 86.84 cm3    AV peak gradient 8 mmHg    LV Systolic Volume Index 23.0 mL/m2    LV Diastolic Volume Index 43.61 mL/m2    LA Volume Index 37.3 mL/m2    LV Mass Index 118 g/m2    Triscuspid Valve Regurgitation Peak Gradient 34 mmHg    BSA 2 m2    Right Atrial Pressure (from IVC) 3 mmHg    TV rest pulmonary artery pressure 37 mmHg    Narrative    · Low normal left ventricular systolic function. The estimated ejection   fraction is 50%.  · Concentric left ventricular hypertrophy.  · Indeterminate left ventricular diastolic function.  · Normal right ventricular systolic function.  · Mild left atrial enlargement.  · Mild aortic regurgitation.  · Mild mitral regurgitation.  · Mild tricuspid regurgitation.  · Normal central venous pressure (3 mmHg).  · The estimated PA systolic pressure is 37 mmHg.        Assessment and Plan:     Brief HPI:     Preop cardiovascular exam  Cardiology consultation has been obtained for preoperative risk assessment prior to surgery.  Patient does  have moderate OM2 disease.  Plans were to revascularized that prior to surgery.  I personally reviewed the coronary angiogram and it has moderate disease.  Unknown ischemic significance.  Patient can not walk around much because of the left foot.  We will look at the ischemic significance of this lesion with the help of a stress test.  So that appropriate risk assessment can be made prior to vascular surgery.    July 26, 2020:  Vascular surgery planned in 2-3 weeks as per Dr. salcido.  Further evaluation with the help stress test in a.m..    Stage 3 chronic kidney disease  Improved    Non healing left heel wound  Management per vascular surgery and Podiatry    S/P CABG x 1  Status post LIMA to LAD.  Moderate disease in OM2.    Chronic combined systolic and diastolic heart failure  Currently euvolemic.  EF 50%.    Essential hypertension  Well controlled on current therapy.  Continue antihypertensives and titrate as needed.    Type 2 diabetes mellitus without complication, with long-term current use of insulin  Management per primary    Peripheral artery disease  Being managed by vascular surgery        VTE Risk Mitigation (From admission, onward)         Ordered     heparin (porcine) injection 5,000 Units  Every 8 hours      07/17/20 2217     IP VTE HIGH RISK PATIENT  Once      07/17/20 2217     Place sequential compression device  Until discontinued      07/17/20 2217     Place HAILEY hose  Until discontinued      07/17/20 2217                Rachel Easley MD  Cardiology  [facility]

## 2020-07-26 NOTE — ASSESSMENT & PLAN NOTE
A: Chronic left heel wound with osteomyelitis of the calcaneus.     P:  -Dressing changed today.  -Patient is aware of options: BK amputation vs 6 weeks IV antibiotics with several months aggressive wound care. Patient opting for IV antibiotics and wound care at this time.  -Surgical debridement to proceed following revascularization of the limb, patient currently being worked up for bypass.  -Antibiotics per infectious disease.    -Continue nursing dressing changes.   -Podiatry will continue to follow.

## 2020-07-26 NOTE — SUBJECTIVE & OBJECTIVE
Subjective:     Interval History: s/p diagnostic angio 07/24/20. Patient is being worked up for a bypass early next week, she does not know the tentative date. She reports that yesterday morning 07/24/20 the nurse changed her dressings prior to giving her pain medication and wrapped the kerlix too tight, this altogether caused a flare up of pain in the left foot. Due to the increased pain the nighttime dressing change was skipped. Patient believed that she had new onset drainage from her heel since the last dressing change but it was only the color of a padded bandage that could be seen through the top layer of gauze. Otherwise no subjective change in the condition of her left foot.     Scheduled Meds:   allopurinoL  100 mg Oral Daily    amiodarone  400 mg Oral Daily    aspirin  81 mg Oral Daily    atorvastatin  80 mg Oral Daily    cadexomer iodine   Topical (Top) Daily    carvediloL  6.25 mg Oral Daily    ceFEPime (MAXIPIME) IVPB  2 g Intravenous Q12H    clopidogreL  75 mg Oral Daily    ergocalciferol  50,000 Units Oral Q7 Days    gabapentin  100 mg Oral TID    heparin (porcine)  5,000 Units Subcutaneous Q8H    insulin aspart U-100  6 Units Subcutaneous TID WM    insulin detemir U-100  10 Units Subcutaneous Daily    pantoprazole  40 mg Oral Daily    [START ON 7/26/2020] sodium citrate-citric acid 500-334 mg/5 ml  15 mL Oral Daily     Continuous Infusions:  PRN Meds:acetaminophen, acetaminophen, ALPRAZolam, dextrose 50%, diphenhydrAMINE, glucagon (human recombinant), insulin aspart U-100, melatonin, morphine, ondansetron, polyethylene glycol, sodium chloride 0.9%, Pharmacy to dose Vancomycin consult **AND** vancomycin - pharmacy to dose    Review of Systems   Constitutional: Negative for chills and fever.   HENT: Negative.    Respiratory: Negative.    Cardiovascular: Negative.    Gastrointestinal: Negative for nausea and vomiting.   Genitourinary: Negative.    Musculoskeletal: Positive for gait  problem (left foot wound).   Skin: Positive for wound (left foot ).   Psychiatric/Behavioral: Negative.      Objective:     Vital Signs (Most Recent):  Temp: 98.5 °F (36.9 °C) (07/25/20 1926)  Pulse: 89 (07/25/20 1926)  Resp: 17 (07/25/20 1926)  BP: (!) 154/77 (07/25/20 1926)  SpO2: 99 % (07/25/20 1926) Vital Signs (24h Range):  Temp:  [97.5 °F (36.4 °C)-98.5 °F (36.9 °C)] 98.5 °F (36.9 °C)  Pulse:  [84-98] 89  Resp:  [16-20] 17  SpO2:  [95 %-100 %] 99 %  BP: (137-164)/(72-80) 154/77     Weight: 87.1 kg (192 lb)  Body mass index is 31.95 kg/m².    Foot Exam    General  Orientation: alert and oriented to person, place, and time       Right Foot/Ankle     Inspection and Palpation  Ecchymosis: none  Tenderness: none   Swelling: none   Skin Exam: skin intact;     Neurovascular  Dorsalis pedis: absent  Posterior tibial: absent  Saphenous nerve sensation: normal  Tibial nerve sensation: normal  Superficial peroneal nerve sensation: normal  Deep peroneal nerve sensation: normal  Sural nerve sensation: normal    Range of Motion    Normal right ankle ROM      Left Foot/Ankle      Inspection and Palpation  Ecchymosis: none  Tenderness: (Left calcaneal wound)  Swelling: (Mild periwound swelling)  Skin Exam: ulcer (left calcaneal wound); no drainage     Neurovascular  Dorsalis pedis: absent  Posterior tibial: absent  Saphenous nerve sensation: normal  Tibial nerve sensation: normal  Superficial peroneal nerve sensation: normal  Deep peroneal nerve sensation: normal  Sural nerve sensation: normal    Range of Motion    Normal left ankle ROM            Laboratory:  BMP:   Recent Labs   Lab 07/25/20  0530   *      K 4.3      CO2 26   BUN 22*   CREATININE 1.2   CALCIUM 8.3*   MG 1.8     CBC:   Recent Labs   Lab 07/25/20  0102   WBC 7.64   RBC 2.88*   HGB 8.6*   HCT 27.3*      MCV 95   MCH 29.9   MCHC 31.5*     CRP: No results for input(s): CRP in the last 168 hours.  ESR: No results for input(s): SEDRATE in  the last 168 hours.  Wound Cultures:   Recent Labs   Lab 07/18/20 0929   LABAERO ENTEROCOCCUS FAECALIS  Moderate  *  STREPTOCOCCUS AGALACTIAE (GROUP B)  Few  Beta-hemolytic streptococci are routinely susceptible to   penicillins,cephalosporins and carbapenems.  *  ENTEROBACTER CLOACAE  Rare  *     Microbiology Results (last 7 days)     Procedure Component Value Units Date/Time    Blood Culture #2 **CANNOT BE ORDERED STAT** [[id number]] Collected: 07/17/20 1712    Order Status: Completed Specimen: Blood from Peripheral, Hand, Left Updated: 07/22/20 1903     Blood Culture, Routine No growth after 5 days.    Blood Culture #1 **CANNOT BE ORDERED STAT** [[id number]] Collected: 07/17/20 1631    Order Status: Completed Specimen: Blood from Peripheral, Antecubital, Right Updated: 07/22/20 1903     Blood Culture, Routine No growth after 5 days.    Aerobic culture [[id number]]  (Abnormal)  (Susceptibility) Collected: 07/18/20 0929    Order Status: Completed Specimen: Bone from Foot, Left Updated: 07/21/20 0745     Aerobic Bacterial Culture ENTEROCOCCUS FAECALIS  Moderate        STREPTOCOCCUS AGALACTIAE (GROUP B)  Few  Beta-hemolytic streptococci are routinely susceptible to   penicillins,cephalosporins and carbapenems.        ENTEROBACTER CLOACAE  Rare      Gram stain [[id number]] Collected: 07/18/20 0929    Order Status: Completed Specimen: Bone from Foot, Left Updated: 07/19/20 0346     Gram Stain Result No WBC's      No organisms seen        Specimen (12h ago, onward)    None          Diagnostic Results:  Angiogram 07/24: Occlusion of the left superficial femoral and popliteal arteries.     Clinical Findings:    Wound: Left heel   Measurement:  5.3cmx5.3cmx0.5cm   Base: fibrous and necrotic eschar base, minimal granular tissue  Periwound skin: HPK  Drainage: serous  Erythema: mild  Probe: probe to bone.   Undermining: fibrotic area undermines 0.5 cm around the rim superiorly, medially, and laterally and 1 cm under the  necrotic eschar      07/25 07/23 07/22

## 2020-07-26 NOTE — PROGRESS NOTES
Pharmacokinetic Assessment Follow Up: IV Vancomycin    Vancomycin serum concentration assessment(s):    The random level was drawn correctly and can be used to guide therapy at this time. The measurement is within the desired definitive target range of 10 to 20 mcg/mL.    Vancomycin Regimen Plan:    Change regimen to Vancomycin 1250 mg IV every q24hr hours with next serum trough concentration measured at 12:00 prior to 3rd dose on 7/28    Drug levels (last 3 results):  Recent Labs   Lab Result Units 07/24/20  0510 07/25/20  0530 07/26/20  1037   Vancomycin, Random ug/mL 14.9 12.0 12.5       Pharmacy will continue to follow and monitor vancomycin.    Please contact pharmacy at extension [phone] for questions regarding this assessment.    Thank you for the consult,   Dianne Mathis       Patient brief summary:  Suyapa Connelly is a 54 y.o. female initiated on antimicrobial therapy with IV Vancomycin for treatment of skin & soft tissue infection    The patient's current regimen is Vancomycin 1250 mg q24hrs    Drug Allergies:   Review of patient's allergies indicates:   Allergen Reactions    Ciprofloxacin      Itchiness    Contrast media      Kidney injury    Pcn [penicillins]      Rash; tolerated ceftriaxone on 1/13/20       Actual Body Weight:   87.1  kg    Renal Function:   Estimated Creatinine Clearance: 59.1 mL/min (based on SCr of 1.2 mg/dL).,     Dialysis Method (if applicable):  N/A    CBC (last 72 hours):  Recent Labs   Lab Result Units 07/25/20  0102   WBC K/uL 7.64   Hemoglobin g/dL 8.6*   Hematocrit % 27.3*   Platelets K/uL 313   Gran% % 68.7   Lymph% % 19.9   Mono% % 9.6   Eosinophil% % 1.2   Basophil% % 0.3   Differential Method  Automated       Metabolic Panel (last 72 hours):  Recent Labs   Lab Result Units 07/24/20  0511 07/25/20  0530 07/26/20  0635   Sodium mmol/L 135* 136  --    Potassium mmol/L 4.3 4.3  --    Chloride mmol/L 107 105  --    CO2 mmol/L 21* 26  --    Glucose mg/dL 146* 206*  --    BUN,  Bld mg/dL 29* 22*  --    Creatinine mg/dL 1.2 1.2  --    Magnesium mg/dL 1.8 1.8 1.6   Phosphorus mg/dL 2.8 2.7 2.4*       Vancomycin Administrations:  vancomycin given in the last 96 hours                     vancomycin in dextrose 5 % 1 gram/250 mL IVPB 1,000 mg (mg) 1,000 mg New Bag 07/25/20 1137    vancomycin 750 mg in dextrose 5 % 250 mL IVPB (ready to mix system) (mg) 750 mg New Bag 07/24/20 1033    vancomycin 500 mg in dextrose 5 % 100 mL IVPB (ready to mix system) (mg) 500 mg New Bag 07/23/20 0936    vancomycin 750 mg in dextrose 5 % 250 mL IVPB (ready to mix system) (mg) 750 mg New Bag 07/22/20 1245                    Microbiologic Results:  Microbiology Results (last 7 days)       Procedure Component Value Units Date/Time    Blood Culture #2 **CANNOT BE ORDERED STAT** [[id number]] Collected: 07/17/20 1712    Order Status: Completed Specimen: Blood from Peripheral, Hand, Left Updated: 07/22/20 1903     Blood Culture, Routine No growth after 5 days.    Blood Culture #1 **CANNOT BE ORDERED STAT** [[id number]] Collected: 07/17/20 1631    Order Status: Completed Specimen: Blood from Peripheral, Antecubital, Right Updated: 07/22/20 1903     Blood Culture, Routine No growth after 5 days.    Aerobic culture [[id number]]  (Abnormal)  (Susceptibility) Collected: 07/18/20 0929    Order Status: Completed Specimen: Bone from Foot, Left Updated: 07/21/20 0745     Aerobic Bacterial Culture ENTEROCOCCUS FAECALIS  Moderate        STREPTOCOCCUS AGALACTIAE (GROUP B)  Few  Beta-hemolytic streptococci are routinely susceptible to   penicillins,cephalosporins and carbapenems.        ENTEROBACTER CLOACAE  Rare

## 2020-07-26 NOTE — PROGRESS NOTES
[facility]  Nephrology  Progress Note    Patient Name: Suyapa Connelly  MRN: [id number]  Admission Date: 7/17/2020  Hospital Length of Stay: 8 days  Attending Provider: Najma Alfred MD   Primary Care Physician: Erlinda Rahman NP  Principal Problem:Acute osteomyelitis of left calcaneus  Date of service 7/25/2020  Consults Reason for consult: ROSLYN on CKD  Subjective:     Interval History: she is doing well, no sob, n/v/d. Noted plans for bypass procedure    Review of patient's allergies indicates:   Allergen Reactions    Ciprofloxacin      Itchiness    Contrast media      Kidney injury    Pcn [penicillins]      Rash; tolerated ceftriaxone on 1/13/20     Current Facility-Administered Medications   Medication Frequency    acetaminophen tablet 650 mg Q8H PRN    acetaminophen tablet 650 mg Q8H PRN    allopurinoL tablet 100 mg Daily    ALPRAZolam tablet 0.5 mg BID PRN    amiodarone tablet 400 mg Daily    aspirin chewable tablet 81 mg Daily    atorvastatin tablet 80 mg Daily    cadexomer iodine 0.9 % gel Daily    carvediloL tablet 6.25 mg Daily    cefepime in dextrose 5 % IVPB 2 g Q12H    clopidogreL tablet 75 mg Daily    dextrose 50% injection 12.5 g PRN    diphenhydrAMINE capsule 25 mg Q6H PRN    ergocalciferol capsule 50,000 Units Q7 Days    gabapentin capsule 100 mg TID    glucagon (human recombinant) injection 1 mg PRN    heparin (porcine) injection 5,000 Units Q8H    insulin aspart U-100 pen 0-5 Units Q6H PRN    insulin aspart U-100 pen 6 Units TID WM    insulin detemir U-100 pen 10 Units Daily    melatonin tablet 6 mg Nightly PRN    morphine injection 2 mg Q4H PRN    ondansetron injection 8 mg Q8H PRN    pantoprazole EC tablet 40 mg Daily    polyethylene glycol packet 17 g BID PRN    sodium chloride 0.9% flush 10 mL PRN    [START ON 7/26/2020] sodium citrate-citric acid 500-334 mg/5 ml solution 15 mL Daily    vancomycin - pharmacy to dose pharmacy to manage  frequency       Objective:     Vital Signs (Most Recent):  Temp: 98.5 °F (36.9 °C) (07/25/20 1926)  Pulse: 89 (07/25/20 1926)  Resp: 18 (07/25/20 2020)  BP: (!) 154/77 (07/25/20 1926)  SpO2: 99 % (07/25/20 1926)  O2 Device (Oxygen Therapy): room air (07/24/20 1415) Vital Signs (24h Range):  Temp:  [97.5 °F (36.4 °C)-98.5 °F (36.9 °C)] 98.5 °F (36.9 °C)  Pulse:  [84-98] 89  Resp:  [16-20] 18  SpO2:  [95 %-100 %] 99 %  BP: (137-164)/(72-80) 154/77     Weight: 87.1 kg (192 lb) (07/24/20 1553)  Body mass index is 31.95 kg/m².  Body surface area is 2 meters squared.    I/O last 3 completed shifts:  In: -   Out: 1000 [Urine:1000]    Physical Exam  Vitals signs and nursing note reviewed.   Constitutional:       General: She is not in acute distress.     Appearance: Normal appearance. She is well-developed. She is not ill-appearing or diaphoretic.   HENT:      Head: Normocephalic and atraumatic.   Eyes:      General: No scleral icterus.        Right eye: No discharge.         Left eye: No discharge.   Cardiovascular:      Rate and Rhythm: Normal rate and regular rhythm.      Heart sounds: Normal heart sounds. No murmur. No friction rub.   Pulmonary:      Effort: Pulmonary effort is normal. No respiratory distress.      Breath sounds: Normal breath sounds. No wheezing or rales.   Abdominal:      General: There is no distension.      Palpations: Abdomen is soft. There is no mass.      Tenderness: There is no abdominal tenderness.   Musculoskeletal:         General: No deformity.      Right lower leg: No edema.      Left lower leg: No edema.   Skin:     General: Skin is warm and dry.      Findings: No rash.      Comments: L foot wound with bandages   Neurological:      Mental Status: She is alert and oriented to person, place, and time.   Psychiatric:         Mood and Affect: Mood normal.         Behavior: Behavior normal.         Thought Content: Thought content normal.         Significant Labs:   CBC:   Recent Labs   Lab  07/25/20  0102   WBC 7.64   RBC 2.88*   HGB 8.6*   HCT 27.3*      MCV 95   MCH 29.9   MCHC 31.5*     CMP:   Recent Labs   Lab 07/22/20  0453  07/25/20  0530   *   < > 206*   CALCIUM 8.1*   < > 8.3*   ALBUMIN 2.1*  --   --    *   < > 136   K 4.7   < > 4.3   CO2 19*   < > 26      < > 105   BUN 50*   < > 22*   CREATININE 2.0*   < > 1.2    < > = values in this interval not displayed.     Microbiology Results (last 7 days)     Procedure Component Value Units Date/Time    Blood Culture #2 **CANNOT BE ORDERED STAT** [[id number]] Collected: 07/17/20 1712    Order Status: Completed Specimen: Blood from Peripheral, Hand, Left Updated: 07/22/20 1903     Blood Culture, Routine No growth after 5 days.    Blood Culture #1 **CANNOT BE ORDERED STAT** [[id number]] Collected: 07/17/20 1631    Order Status: Completed Specimen: Blood from Peripheral, Antecubital, Right Updated: 07/22/20 1903     Blood Culture, Routine No growth after 5 days.    Aerobic culture [[id number]]  (Abnormal)  (Susceptibility) Collected: 07/18/20 0929    Order Status: Completed Specimen: Bone from Foot, Left Updated: 07/21/20 0745     Aerobic Bacterial Culture ENTEROCOCCUS FAECALIS  Moderate        STREPTOCOCCUS AGALACTIAE (GROUP B)  Few  Beta-hemolytic streptococci are routinely susceptible to   penicillins,cephalosporins and carbapenems.        ENTEROBACTER CLOACAE  Rare      Gram stain [[id number]] Collected: 07/18/20 0929    Order Status: Completed Specimen: Bone from Foot, Left Updated: 07/19/20 0346     Gram Stain Result No WBC's      No organisms seen        Recent Labs   Lab 07/22/20  0037   COLORU Straw   SPECGRAV 1.015   PHUR 5.0   PROTEINUA 2+*   BACTERIA None   NITRITE Negative   LEUKOCYTESUR Negative   UROBILINOGEN Negative   HYALINECASTS 0     All labs within the past 24 hours have been reviewed.    Significant Imaging:  X-Ray: Reviewed    Assessment/Plan:     Active Diagnoses:    Diagnosis Date Noted POA    PRINCIPAL  PROBLEM:  Acute osteomyelitis of left calcaneus [M86.172] 07/18/2020 Yes    Preop cardiovascular exam [Z01.810] 07/25/2020 Not Applicable    Hyponatremia [E87.1] 07/22/2020 Yes    Stage 3 chronic kidney disease [N18.3] 07/18/2020 Yes    Non healing left heel wound [S91.302A] 07/17/2020 Yes    S/P CABG x 1 [Z95.1] 01/27/2020 Not Applicable    Mixed hyperlipidemia [E78.2] 12/13/2019 Yes    Chronic combined systolic and diastolic heart failure [I50.42] 12/08/2019 Yes    Type 2 diabetes mellitus without complication, with long-term current use of insulin [E11.9, Z79.4] 05/05/2018 Not Applicable    Essential hypertension [I10] 05/05/2018 Yes    Peripheral artery disease [I73.9]  Yes      Problems Resolved During this Admission:       ROSLYN on CKD 3   - Has hx of HD dependant ROSLYN (LEVI) requiring RRT from 1/16/20 -3/10/20  - Recent baseline Crappears to be 1.4 -1.9 (GFR 30-43%); suspect underlying diabetic nephropathy Hgb A 1c > 14  - Cr stable, continue to hold lasix, weights are relatively stable but up overall, continue to hold IVF, consider restarting Po lasix as needed  - caution with IVF in advanced CKD patients - monitor O2 closely  - renally dose meds for current GFR/ avoid nephrotoxic meds  - strict Is/ Os/ daily weights  - daily labs while admitted     Hypervolemia   - continue to hold IVF, monitor I&Os, daily weights, consider restarting lasix PO as needed, currently volume status is stable and appears euvolemic     Hyperkalemia  - resolved     Metabolic Acidosis   - d/t ROSLYN + CKD  - continue to hold IVF, will convert bicitra to bicarb per patient preference and reduce dose  - AM labs     CKD MBD  - PTH 78 and Vit D 7, phos is stable  - continue ergo   - labs in AM      Proteinuria   - UPC 2.5 g   - would benefit fron ACE/ ARB once ROSLYN resolves and after surgical procedures     Hyperuricemia   - continue allopurinol 100 mg PO QD    Acute osteomyelitis of L foot, non-healing wound  CAD s/p  CABG  DM  PAD    Thank you for allowing me to participate in the care of your patient.  Please call with any questions.    Date of service: 7/25/2020  Luisana Payne MD   Nephrology  [facility]  Office [phone]   [facility]

## 2020-07-26 NOTE — NURSING
Report Recieved from off going nurse Lucero BRIGHT. Patient is AAO, on Room air, no distress noted, Bed in lowest position, wheels locked, call light in reach.

## 2020-07-26 NOTE — PROGRESS NOTES
[facility]  Podiatry  Progress Note    Patient Name: Suyapa Connelly  MRN: [id number]  Admission Date: 7/17/2020  Hospital Length of Stay: 8 days  Attending Physician: Najma Alfred MD  Primary Care Provider: Erlinda Rahman NP     Subjective:     Interval History: s/p diagnostic angio 07/24/20. Patient is being worked up for a bypass early next week, she does not know the tentative date. She reports that yesterday morning 07/24/20 the nurse changed her dressings prior to giving her pain medication and wrapped the kerlix too tight, this altogether caused a flare up of pain in the left foot. Due to the increased pain the nighttime dressing change was skipped. Patient believed that she had new onset drainage from her heel since the last dressing change but it was only the color of a padded bandage that could be seen through the top layer of gauze. Otherwise no subjective change in the condition of her left foot.     Scheduled Meds:   allopurinoL  100 mg Oral Daily    amiodarone  400 mg Oral Daily    aspirin  81 mg Oral Daily    atorvastatin  80 mg Oral Daily    cadexomer iodine   Topical (Top) Daily    carvediloL  6.25 mg Oral Daily    ceFEPime (MAXIPIME) IVPB  2 g Intravenous Q12H    clopidogreL  75 mg Oral Daily    ergocalciferol  50,000 Units Oral Q7 Days    gabapentin  100 mg Oral TID    heparin (porcine)  5,000 Units Subcutaneous Q8H    insulin aspart U-100  6 Units Subcutaneous TID WM    insulin detemir U-100  10 Units Subcutaneous Daily    pantoprazole  40 mg Oral Daily    [START ON 7/26/2020] sodium citrate-citric acid 500-334 mg/5 ml  15 mL Oral Daily     Continuous Infusions:  PRN Meds:acetaminophen, acetaminophen, ALPRAZolam, dextrose 50%, diphenhydrAMINE, glucagon (human recombinant), insulin aspart U-100, melatonin, morphine, ondansetron, polyethylene glycol, sodium chloride 0.9%, Pharmacy to dose Vancomycin consult **AND** vancomycin - pharmacy to dose    Review of  Systems   Constitutional: Negative for chills and fever.   HENT: Negative.    Respiratory: Negative.    Cardiovascular: Negative.    Gastrointestinal: Negative for nausea and vomiting.   Genitourinary: Negative.    Musculoskeletal: Positive for gait problem (left foot wound).   Skin: Positive for wound (left foot ).   Psychiatric/Behavioral: Negative.      Objective:     Vital Signs (Most Recent):  Temp: 98.5 °F (36.9 °C) (07/25/20 1926)  Pulse: 89 (07/25/20 1926)  Resp: 17 (07/25/20 1926)  BP: (!) 154/77 (07/25/20 1926)  SpO2: 99 % (07/25/20 1926) Vital Signs (24h Range):  Temp:  [97.5 °F (36.4 °C)-98.5 °F (36.9 °C)] 98.5 °F (36.9 °C)  Pulse:  [84-98] 89  Resp:  [16-20] 17  SpO2:  [95 %-100 %] 99 %  BP: (137-164)/(72-80) 154/77     Weight: 87.1 kg (192 lb)  Body mass index is 31.95 kg/m².    Foot Exam    General  Orientation: alert and oriented to person, place, and time       Right Foot/Ankle     Inspection and Palpation  Ecchymosis: none  Tenderness: none   Swelling: none   Skin Exam: skin intact;     Neurovascular  Dorsalis pedis: absent  Posterior tibial: absent  Saphenous nerve sensation: normal  Tibial nerve sensation: normal  Superficial peroneal nerve sensation: normal  Deep peroneal nerve sensation: normal  Sural nerve sensation: normal    Range of Motion    Normal right ankle ROM      Left Foot/Ankle      Inspection and Palpation  Ecchymosis: none  Tenderness: (Left calcaneal wound)  Swelling: (Mild periwound swelling)  Skin Exam: ulcer (left calcaneal wound); no drainage     Neurovascular  Dorsalis pedis: absent  Posterior tibial: absent  Saphenous nerve sensation: normal  Tibial nerve sensation: normal  Superficial peroneal nerve sensation: normal  Deep peroneal nerve sensation: normal  Sural nerve sensation: normal    Range of Motion    Normal left ankle ROM            Laboratory:  BMP:   Recent Labs   Lab 07/25/20  0530   *      K 4.3      CO2 26   BUN 22*   CREATININE 1.2   CALCIUM  8.3*   MG 1.8     CBC:   Recent Labs   Lab 07/25/20  0102   WBC 7.64   RBC 2.88*   HGB 8.6*   HCT 27.3*      MCV 95   MCH 29.9   MCHC 31.5*     CRP: No results for input(s): CRP in the last 168 hours.  ESR: No results for input(s): SEDRATE in the last 168 hours.  Wound Cultures:   Recent Labs   Lab 07/18/20 0929   LABAERO ENTEROCOCCUS FAECALIS  Moderate  *  STREPTOCOCCUS AGALACTIAE (GROUP B)  Few  Beta-hemolytic streptococci are routinely susceptible to   penicillins,cephalosporins and carbapenems.  *  ENTEROBACTER CLOACAE  Rare  *     Microbiology Results (last 7 days)     Procedure Component Value Units Date/Time    Blood Culture #2 **CANNOT BE ORDERED STAT** [[id number]] Collected: 07/17/20 1712    Order Status: Completed Specimen: Blood from Peripheral, Hand, Left Updated: 07/22/20 1903     Blood Culture, Routine No growth after 5 days.    Blood Culture #1 **CANNOT BE ORDERED STAT** [[id number]] Collected: 07/17/20 1631    Order Status: Completed Specimen: Blood from Peripheral, Antecubital, Right Updated: 07/22/20 1903     Blood Culture, Routine No growth after 5 days.    Aerobic culture [[id number]]  (Abnormal)  (Susceptibility) Collected: 07/18/20 0929    Order Status: Completed Specimen: Bone from Foot, Left Updated: 07/21/20 0745     Aerobic Bacterial Culture ENTEROCOCCUS FAECALIS  Moderate        STREPTOCOCCUS AGALACTIAE (GROUP B)  Few  Beta-hemolytic streptococci are routinely susceptible to   penicillins,cephalosporins and carbapenems.        ENTEROBACTER CLOACAE  Rare      Gram stain [[id number]] Collected: 07/18/20 0929    Order Status: Completed Specimen: Bone from Foot, Left Updated: 07/19/20 0346     Gram Stain Result No WBC's      No organisms seen        Specimen (12h ago, onward)    None          Diagnostic Results:  Angiogram 07/24: Occlusion of the left superficial femoral and popliteal arteries.     Clinical Findings:    Wound: Left heel   Measurement:  5.3cmx5.3cmx0.5cm   Base: fibrous  "and necrotic eschar base, minimal granular tissue  Periwound skin: HPK  Drainage: serous  Erythema: mild  Probe: probe to bone.   Undermining: fibrotic area undermines 0.5 cm around the rim superiorly, medially, and laterally and 1 cm under the necrotic eschar      07/25 07/23 07/22          Assessment/Plan:     * Acute osteomyelitis of left calcaneus  See "nonhealing left heel wound"    Non healing left heel wound  A: Chronic left heel wound with osteomyelitis of the calcaneus.     P:  -Dressing changed today.  -Patient is aware of options: BK amputation vs 6 weeks IV antibiotics with several months aggressive wound care. Patient opting for IV antibiotics and wound care at this time.  -Surgical debridement to proceed following revascularization of the limb, patient currently being worked up for bypass.  -Antibiotics per infectious disease.    -Continue nursing dressing changes.   -Podiatry will continue to follow.        Shaw Tomas DPM PGY-1  Podiatric Medicine & Surgery  [facility]    "

## 2020-07-26 NOTE — PLAN OF CARE
Problem: Wound  Goal: Optimal Wound Healing  Outcome: Ongoing, Progressing  Intervention: Promote Effective Wound Healing  Flowsheets (Taken 7/26/2020 8110)  Oral Nutrition Promotion: rest periods promoted  Sleep/Rest Enhancement: awakenings minimized  Pain Management Interventions:   pain management plan reviewed with patient/caregiver   care clustered

## 2020-07-26 NOTE — ASSESSMENT & PLAN NOTE
Cardiology consultation has been obtained for preoperative risk assessment prior to surgery.  Patient does have moderate OM2 disease.  Plans were to revascularized that prior to surgery.  I personally reviewed the coronary angiogram and it has moderate disease.  Unknown ischemic significance.  Patient can not walk around much because of the left foot.  We will look at the ischemic significance of this lesion with the help of a stress test.  So that appropriate risk assessment can be made prior to vascular surgery.    July 26, 2020:  Vascular surgery planned in 2-3 weeks as per Dr. salcido.  Further evaluation with the help stress test in a.m..

## 2020-07-26 NOTE — ASSESSMENT & PLAN NOTE
Uncontrolled with hyperglycemia. Manifestations likely of CKD 3      - Will check an A1c- > 14.  - Glucose increased secondary to steroids.  - Added insulin with meals and will increase prandial insulin to 8U and increased basal to 15U for better control of blood glucose

## 2020-07-27 LAB
ANION GAP SERPL CALC-SCNC: 4 MMOL/L (ref 8–16)
BASOPHILS # BLD AUTO: 0.06 K/UL (ref 0–0.2)
BASOPHILS NFR BLD: 0.6 % (ref 0–1.9)
BUN SERPL-MCNC: 14 MG/DL (ref 6–20)
CALCIUM SERPL-MCNC: 8.3 MG/DL (ref 8.7–10.5)
CHLORIDE SERPL-SCNC: 104 MMOL/L (ref 95–110)
CO2 SERPL-SCNC: 28 MMOL/L (ref 23–29)
CREAT SERPL-MCNC: 1.3 MG/DL (ref 0.5–1.4)
CV STRESS BASE HR: 90 BPM
DIASTOLIC BLOOD PRESSURE: 75 MMHG
DIFFERENTIAL METHOD: ABNORMAL
EOSINOPHIL # BLD AUTO: 0.3 K/UL (ref 0–0.5)
EOSINOPHIL NFR BLD: 2.8 % (ref 0–8)
ERYTHROCYTE [DISTWIDTH] IN BLOOD BY AUTOMATED COUNT: 14.7 % (ref 11.5–14.5)
EST. GFR  (AFRICAN AMERICAN): 54 ML/MIN/1.73 M^2
EST. GFR  (NON AFRICAN AMERICAN): 47 ML/MIN/1.73 M^2
GLUCOSE SERPL-MCNC: 149 MG/DL (ref 70–110)
HCT VFR BLD AUTO: 29.5 % (ref 37–48.5)
HGB BLD-MCNC: 9.4 G/DL (ref 12–16)
IMM GRANULOCYTES # BLD AUTO: 0.03 K/UL (ref 0–0.04)
IMM GRANULOCYTES NFR BLD AUTO: 0.3 % (ref 0–0.5)
LYMPHOCYTES # BLD AUTO: 2.1 K/UL (ref 1–4.8)
LYMPHOCYTES NFR BLD: 22.3 % (ref 18–48)
MAGNESIUM SERPL-MCNC: 1.8 MG/DL (ref 1.6–2.6)
MCH RBC QN AUTO: 31.2 PG (ref 27–31)
MCHC RBC AUTO-ENTMCNC: 31.9 G/DL (ref 32–36)
MCV RBC AUTO: 98 FL (ref 82–98)
MONOCYTES # BLD AUTO: 1 K/UL (ref 0.3–1)
MONOCYTES NFR BLD: 10 % (ref 4–15)
NEUTROPHILS # BLD AUTO: 6.1 K/UL (ref 1.8–7.7)
NEUTROPHILS NFR BLD: 64 % (ref 38–73)
NRBC BLD-RTO: 0 /100 WBC
NUC REST EJECTION FRACTION: 65
OHS CV CPX 85 PERCENT MAX PREDICTED HEART RATE MALE: 135
OHS CV CPX MAX PREDICTED HEART RATE: 159
OHS CV CPX PATIENT IS FEMALE: 1
OHS CV CPX PATIENT IS MALE: 0
OHS CV CPX PEAK DIASTOLIC BLOOD PRESSURE: 70 MMHG
OHS CV CPX PEAK HEAR RATE: 97 BPM
OHS CV CPX PEAK RATE PRESSURE PRODUCT: NORMAL
OHS CV CPX PEAK SYSTOLIC BLOOD PRESSURE: 140 MMHG
OHS CV CPX PERCENT MAX PREDICTED HEART RATE ACHIEVED: 61
OHS CV CPX RATE PRESSURE PRODUCT PRESENTING: NORMAL
PHOSPHATE SERPL-MCNC: 2.6 MG/DL (ref 2.7–4.5)
PLATELET # BLD AUTO: 362 K/UL (ref 150–350)
PMV BLD AUTO: 10.9 FL (ref 9.2–12.9)
POCT GLUCOSE: 134 MG/DL (ref 70–110)
POCT GLUCOSE: 150 MG/DL (ref 70–110)
POCT GLUCOSE: 307 MG/DL (ref 70–110)
POTASSIUM SERPL-SCNC: 4.1 MMOL/L (ref 3.5–5.1)
RBC # BLD AUTO: 3.01 M/UL (ref 4–5.4)
SODIUM SERPL-SCNC: 136 MMOL/L (ref 136–145)
SYSTOLIC BLOOD PRESSURE: 141 MMHG
WBC # BLD AUTO: 9.49 K/UL (ref 3.9–12.7)

## 2020-07-27 PROCEDURE — 36415 COLL VENOUS BLD VENIPUNCTURE: CPT

## 2020-07-27 PROCEDURE — 99232 PR SUBSEQUENT HOSPITAL CARE,LEVL II: ICD-10-PCS | Mod: ,,, | Performed by: PODIATRIST

## 2020-07-27 PROCEDURE — 63600175 PHARM REV CODE 636 W HCPCS: Performed by: HOSPITALIST

## 2020-07-27 PROCEDURE — 94761 N-INVAS EAR/PLS OXIMETRY MLT: CPT

## 2020-07-27 PROCEDURE — 25000003 PHARM REV CODE 250: Performed by: NURSE PRACTITIONER

## 2020-07-27 PROCEDURE — 63600175 PHARM REV CODE 636 W HCPCS: Performed by: NURSE PRACTITIONER

## 2020-07-27 PROCEDURE — 25000003 PHARM REV CODE 250: Performed by: PHYSICIAN ASSISTANT

## 2020-07-27 PROCEDURE — 63600175 PHARM REV CODE 636 W HCPCS: Performed by: INTERNAL MEDICINE

## 2020-07-27 PROCEDURE — 21400001 HC TELEMETRY ROOM

## 2020-07-27 PROCEDURE — 99233 SBSQ HOSP IP/OBS HIGH 50: CPT | Mod: ,,, | Performed by: INTERNAL MEDICINE

## 2020-07-27 PROCEDURE — 99233 PR SUBSEQUENT HOSPITAL CARE,LEVL III: ICD-10-PCS | Mod: ,,, | Performed by: INTERNAL MEDICINE

## 2020-07-27 PROCEDURE — 25000003 PHARM REV CODE 250: Performed by: HOSPITALIST

## 2020-07-27 PROCEDURE — 83735 ASSAY OF MAGNESIUM: CPT

## 2020-07-27 PROCEDURE — 80048 BASIC METABOLIC PNL TOTAL CA: CPT

## 2020-07-27 PROCEDURE — 84100 ASSAY OF PHOSPHORUS: CPT

## 2020-07-27 PROCEDURE — 99232 SBSQ HOSP IP/OBS MODERATE 35: CPT | Mod: ,,, | Performed by: PODIATRIST

## 2020-07-27 RX ORDER — SODIUM,POTASSIUM PHOSPHATES 280-250MG
2 POWDER IN PACKET (EA) ORAL ONCE
Status: COMPLETED | OUTPATIENT
Start: 2020-07-27 | End: 2020-07-27

## 2020-07-27 RX ORDER — REGADENOSON 0.08 MG/ML
0.4 INJECTION, SOLUTION INTRAVENOUS ONCE
Status: COMPLETED | OUTPATIENT
Start: 2020-07-27 | End: 2020-07-27

## 2020-07-27 RX ADMIN — ALLOPURINOL 100 MG: 100 TABLET ORAL at 02:07

## 2020-07-27 RX ADMIN — POTASSIUM & SODIUM PHOSPHATES POWDER PACK 280-160-250 MG 2 PACKET: 280-160-250 PACK at 02:07

## 2020-07-27 RX ADMIN — INSULIN DETEMIR 15 UNITS: 100 INJECTION, SOLUTION SUBCUTANEOUS at 04:07

## 2020-07-27 RX ADMIN — AMIODARONE HYDROCHLORIDE 400 MG: 200 TABLET ORAL at 02:07

## 2020-07-27 RX ADMIN — MORPHINE SULFATE 2 MG: 10 INJECTION INTRAVENOUS at 01:07

## 2020-07-27 RX ADMIN — HEPARIN SODIUM 5000 UNITS: 5000 INJECTION INTRAVENOUS; SUBCUTANEOUS at 09:07

## 2020-07-27 RX ADMIN — ATORVASTATIN CALCIUM 80 MG: 40 TABLET, FILM COATED ORAL at 02:07

## 2020-07-27 RX ADMIN — GABAPENTIN 100 MG: 100 CAPSULE ORAL at 09:07

## 2020-07-27 RX ADMIN — HEPARIN SODIUM 5000 UNITS: 5000 INJECTION INTRAVENOUS; SUBCUTANEOUS at 05:07

## 2020-07-27 RX ADMIN — MORPHINE SULFATE 2 MG: 10 INJECTION INTRAVENOUS at 02:07

## 2020-07-27 RX ADMIN — ASPIRIN 81 MG 81 MG: 81 TABLET ORAL at 02:07

## 2020-07-27 RX ADMIN — REGADENOSON 0.4 MG: 0.08 INJECTION, SOLUTION INTRAVENOUS at 11:07

## 2020-07-27 RX ADMIN — CLOPIDOGREL BISULFATE 75 MG: 75 TABLET, FILM COATED ORAL at 02:07

## 2020-07-27 RX ADMIN — GABAPENTIN 100 MG: 100 CAPSULE ORAL at 02:07

## 2020-07-27 RX ADMIN — HEPARIN SODIUM 5000 UNITS: 5000 INJECTION INTRAVENOUS; SUBCUTANEOUS at 02:07

## 2020-07-27 RX ADMIN — INSULIN ASPART 8 UNITS: 100 INJECTION, SOLUTION INTRAVENOUS; SUBCUTANEOUS at 04:07

## 2020-07-27 RX ADMIN — INSULIN ASPART 5 UNITS: 100 INJECTION, SOLUTION INTRAVENOUS; SUBCUTANEOUS at 04:07

## 2020-07-27 RX ADMIN — CEFEPIME HYDROCHLORIDE 2 G: 2 INJECTION, SOLUTION INTRAVENOUS at 06:07

## 2020-07-27 RX ADMIN — Medication 1250 MG: at 02:07

## 2020-07-27 RX ADMIN — CEFEPIME HYDROCHLORIDE 2 G: 2 INJECTION, SOLUTION INTRAVENOUS at 04:07

## 2020-07-27 RX ADMIN — INSULIN ASPART 8 UNITS: 100 INJECTION, SOLUTION INTRAVENOUS; SUBCUTANEOUS at 02:07

## 2020-07-27 RX ADMIN — PANTOPRAZOLE SODIUM 40 MG: 40 TABLET, DELAYED RELEASE ORAL at 02:07

## 2020-07-27 RX ADMIN — CARVEDILOL 6.25 MG: 6.25 TABLET, FILM COATED ORAL at 02:07

## 2020-07-27 NOTE — ASSESSMENT & PLAN NOTE
- MRI confirms osteo. Bone Bx- 7/18  - Afebrile and with resolved leukocytosis   - Wound cultures with Enterococcus, E cloacae, and GBS.   - ID consulted for ABx recommendations and will d/c axtreoman and start cefepime 2g q12h; continue vancomycin   - Vascular Surgery consulted and planning angiogram 7/24/2020.  - Patient declines amputation and wants to try conservative tx with  IV ABx's.   - CKD and Nephrology consulted.  Vascular discussed case with Nephrology and patient cleared from renal for vascular intervention.    - Podiatry planning debridement, if she remains 7/31 they will come assess for any procedure.  - Vascular surgery following with workup for revascularization procedure that she will need for healing. Vein mapping and Angiogram complete, uncertain timing for revascularization procedure  -stress test today to complete cardiac workup

## 2020-07-27 NOTE — PLAN OF CARE
Patient was not seen today because she was off the floor for cardiology procedure. Will f/u with her in am. She has osteomyelitis of L foot with bone cultures growing e.faecalis, group B sgtrep and enterobacter. She has a labeled penicillin allergy, that I question is real. Will discuss allergy with her tomorrow and could potentially use zosyn monotherapy outpatient. Otherwise, would continue vanc/cefepime x 6 weeks for osteo. Can not use quinolones with amiodarone. Needs picc line and home health arranged. Ultimately needs adequate blood flow to heal wound.

## 2020-07-27 NOTE — ASSESSMENT & PLAN NOTE
Uncontrolled with hyperglycemia. Manifestations likely of CKD 3      - Will check an A1c- > 14.  - Glucose increased secondary to steroids.  - Added insulin with meals and will increase prandial insulin to 8U and increased basal to 15U for better control of blood glucose   -improved control

## 2020-07-27 NOTE — PROGRESS NOTES
[facility]  Cardiology  Progress Note    Patient Name: Suyapa Connelly  MRN: [id number]  Admission Date: 7/17/2020  Hospital Length of Stay: 10 days  Code Status: Full Code   Attending Physician: Corey Fisher MD   Primary Care Physician: Erlinda Rahman NP  Expected Discharge Date: 7/29/2020  Principal Problem:Acute osteomyelitis of left calcaneus    Subjective:       Interval History:  Doing fine.  Denies any chest pains.  Denies shortness of breath.    Review of Systems   Constitution: Negative.   HENT: Negative.    Eyes: Negative.    Cardiovascular: Positive for claudication.   Respiratory: Negative.    Endocrine: Negative.    Hematologic/Lymphatic: Negative.    Skin: Positive for poor wound healing.   Musculoskeletal: Negative.    Gastrointestinal: Positive for nausea and vomiting.   Genitourinary: Negative.    Neurological: Negative.    Psychiatric/Behavioral: Negative.    Allergic/Immunologic: Negative.      Objective:     Vital Signs (Most Recent):  Temp: 98.3 °F (36.8 °C) (07/27/20 0759)  Pulse: 90 (07/27/20 0759)  Resp: 18 (07/27/20 0759)  BP: (!) 161/67 (07/27/20 0759)  SpO2: 99 % (07/27/20 0759) Vital Signs (24h Range):  Temp:  [97.5 °F (36.4 °C)-98.4 °F (36.9 °C)] 98.3 °F (36.8 °C)  Pulse:  [82-93] 90  Resp:  [18-20] 18  SpO2:  [99 %-100 %] 99 %  BP: (136-161)/(67-79) 161/67     Weight: 88.5 kg (195 lb 1.7 oz)  Body mass index is 32.47 kg/m².     SpO2: 99 %  O2 Device (Oxygen Therapy): room air      Intake/Output Summary (Last 24 hours) at 7/27/2020 1127  Last data filed at 7/27/2020 0547  Gross per 24 hour   Intake 220 ml   Output 1000 ml   Net -780 ml       Lines/Drains/Airways     Peripheral Intravenous Line                 Midline Catheter Insertion/Assessment  - Single Lumen 07/18/20 1640 basilic vein (medial side of arm) 18g x 10cm 8 days                Physical Exam   Constitutional: She is oriented to person, place, and time. She appears well-developed and well-nourished.    HENT:   Head: Normocephalic.   Eyes: Pupils are equal, round, and reactive to light.   Neck: Normal range of motion. Neck supple.   Cardiovascular: Normal rate and regular rhythm.   Pulmonary/Chest: Effort normal and breath sounds normal.   Abdominal: Soft. Normal appearance and bowel sounds are normal. There is no abdominal tenderness.   Musculoskeletal:      Comments: Left lower extremity in wound care dressing   Neurological: She is alert and oriented to person, place, and time.   Skin: Skin is warm.   Psychiatric: She has a normal mood and affect.   Nursing note and vitals reviewed.      Significant Labs:   BMP:   Recent Labs   Lab 07/26/20  0635 07/26/20  1224 07/27/20  0518   GLU  --  186* 149*   NA  --  136 136   K  --  4.2 4.1   CL  --  104 104   CO2  --  28 28   BUN  --  13 14   CREATININE  --  1.2 1.3   CALCIUM  --  8.7 8.3*   MG 1.6  --  1.8   , CMP   Recent Labs   Lab 07/26/20  1224 07/27/20  0518    136   K 4.2 4.1    104   CO2 28 28   * 149*   BUN 13 14   CREATININE 1.2 1.3   CALCIUM 8.7 8.3*   ANIONGAP 4* 4*   ESTGFRAFRICA 60 54*   EGFRNONAA 52* 47*   , CBC   Recent Labs   Lab 07/26/20  2334   WBC 9.49   HGB 9.4*   HCT 29.5*   *   , INR No results for input(s): INR, PROTIME in the last 48 hours., Lipid Panel No results for input(s): CHOL, HDL, LDLCALC, TRIG, CHOLHDL in the last 48 hours., Troponin No results for input(s): TROPONINI in the last 48 hours. and All pertinent lab results from the last 24 hours have been reviewed.    Significant Imaging: Echocardiogram:   Transthoracic echo (TTE) complete (Cupid Only):   Results for orders placed or performed during the hospital encounter of 07/17/20   Echo Color Flow Doppler? Yes   Result Value Ref Range    Ascending aorta 2.65 cm    STJ 2.48 cm    AV mean gradient 5 mmHg    Ao peak niya 1.43 m/s    Ao VTI 37.05 cm    IVRT 121.11 msec    IVS 1.39 (A) 0.6 - 1.1 cm    LA size 3.69 cm    Left Atrium Major Axis 5.22 cm    Left Atrium  Minor Axis 5.09 cm    LVIDD 4.34 3.5 - 6.0 cm    LVIDS 3.32 2.1 - 4.0 cm    LVOT diameter 2.08 cm    LVOT peak VTI 25.57 cm    PW 1.36 (A) 0.6 - 1.1 cm    MV Peak A Ty 1.05 m/s    E wave decelartion time 205.80 msec    MV Peak E Ty 0.76 m/s    PV Peak D Ty 0.28 m/s    PV Peak S Ty 0.60 m/s    RA Major Axis 5.20 cm    RA Width 3.92 cm    RVDD 3.73 cm    Sinus 2.72 cm    TAPSE 1.40 cm    TR Max Ty 2.90 m/s    LA WIDTH 4.49 cm    Ao root annulus 2.92 cm    AORTIC VALVE CUSP SEPERATION 1.49 cm    PV PEAK VELOCITY 0.88 cm/s    LV Diastolic Volume 84.78 mL    LV Systolic Volume 44.71 mL    RV S' 10.71 cm/s    LVOT peak ty 0.99 m/s    FS 24 %    LA volume 72.59 cm3    LV mass 229.11 g    Left Ventricle Relative Wall Thickness 0.63 cm    AV valve area 2.34 cm2    AV Velocity Ratio 0.69     AV index (prosthetic) 0.69     E/A ratio 0.72     Pulm vein S/D ratio 2.14     LVOT area 3.4 cm2    LVOT stroke volume 86.84 cm3    AV peak gradient 8 mmHg    LV Systolic Volume Index 23.0 mL/m2    LV Diastolic Volume Index 43.61 mL/m2    LA Volume Index 37.3 mL/m2    LV Mass Index 118 g/m2    Triscuspid Valve Regurgitation Peak Gradient 34 mmHg    BSA 2 m2    Right Atrial Pressure (from IVC) 3 mmHg    TV rest pulmonary artery pressure 37 mmHg    Narrative    · Low normal left ventricular systolic function. The estimated ejection   fraction is 50%.  · Concentric left ventricular hypertrophy.  · Indeterminate left ventricular diastolic function.  · Normal right ventricular systolic function.  · Mild left atrial enlargement.  · Mild aortic regurgitation.  · Mild mitral regurgitation.  · Mild tricuspid regurgitation.  · Normal central venous pressure (3 mmHg).  · The estimated PA systolic pressure is 37 mmHg.        Assessment and Plan:     Brief HPI:     Preop cardiovascular exam  Cardiology consultation has been obtained for preoperative risk assessment prior to surgery.  Patient does have moderate OM2 disease.  Plans were to  revascularized that prior to surgery.  I personally reviewed the coronary angiogram and it has moderate disease.  Unknown ischemic significance.  Patient can not walk around much because of the left foot.  We will look at the ischemic significance of this lesion with the help of a stress test.  So that appropriate risk assessment can be made prior to vascular surgery.    July 26, 2020:  Vascular surgery planned in 2-3 weeks as per Dr. adams.  Further evaluation with the help stress test in a.m..    July 27, 2020:  Stress test today    Stage 3 chronic kidney disease  Improved    Non healing left heel wound  Management per vascular surgery and Podiatry    S/P CABG x 1  Status post LIMA to LAD.  Moderate disease in OM2.    Chronic combined systolic and diastolic heart failure  Currently euvolemic.  EF 50%.    Essential hypertension  Well controlled on current therapy.  Continue antihypertensives and titrate as needed.    Type 2 diabetes mellitus without complication, with long-term current use of insulin  Management per primary    Peripheral artery disease  Being managed by vascular surgery        VTE Risk Mitigation (From admission, onward)         Ordered     heparin (porcine) injection 5,000 Units  Every 8 hours      07/17/20 2217     IP VTE HIGH RISK PATIENT  Once      07/17/20 2217     Place sequential compression device  Until discontinued      07/17/20 2217     Place HAILEY hose  Until discontinued      07/17/20 2217                Rachel Easley MD  Cardiology  [facility]

## 2020-07-27 NOTE — PROGRESS NOTES
[facility]  Nephrology  Progress Note    Patient Name: Suyapa Connelly  MRN: [id number]  Admission Date: 7/17/2020  Hospital Length of Stay: 9 days  Attending Provider: Najma Alfred MD   Primary Care Physician: Erlinda Rahman NP  Principal Problem:Acute osteomyelitis of left calcaneus  Date of service 7/26/2020  Consults Reason for consult: ROSLYN on CKD  Subjective:     Interval History: Denies CP, SOB. She had an episode of vomiting today that she thinks may be d/t taking medication. Plan for ST in the morning. Plan for LLE bypass next week. Replace Mg and phos today.     Review of patient's allergies indicates:   Allergen Reactions    Ciprofloxacin      Itchiness    Contrast media      Kidney injury    Pcn [penicillins]      Rash; tolerated ceftriaxone on 1/13/20     Current Facility-Administered Medications   Medication Frequency    acetaminophen tablet 650 mg Q8H PRN    acetaminophen tablet 650 mg Q8H PRN    allopurinoL tablet 100 mg Daily    ALPRAZolam tablet 0.5 mg BID PRN    amiodarone tablet 400 mg Daily    aspirin chewable tablet 81 mg Daily    atorvastatin tablet 80 mg Daily    cadexomer iodine 0.9 % gel Daily    carvediloL tablet 6.25 mg Daily    cefepime in dextrose 5 % IVPB 2 g Q12H    clopidogreL tablet 75 mg Daily    dextrose 50% injection 12.5 g PRN    diphenhydrAMINE capsule 25 mg Q6H PRN    ergocalciferol capsule 50,000 Units Q7 Days    gabapentin capsule 100 mg TID    glucagon (human recombinant) injection 1 mg PRN    heparin (porcine) injection 5,000 Units Q8H    insulin aspart U-100 pen 0-5 Units Q6H PRN    insulin aspart U-100 pen 8 Units TID WM    insulin detemir U-100 pen 15 Units Daily    melatonin tablet 6 mg Nightly PRN    morphine injection 2 mg Q4H PRN    ondansetron injection 8 mg Q8H PRN    pantoprazole EC tablet 40 mg Daily    polyethylene glycol packet 17 g BID PRN    sodium bicarbonate tablet 650 mg Daily    sodium chloride 0.9%  flush 10 mL PRN    vancomycin - pharmacy to dose pharmacy to manage frequency    vancomycin 1.25 g in dextrose 5% 250 mL IVPB (ready to mix) Q24H       Objective:     Vital Signs (Most Recent):  Temp: 98.4 °F (36.9 °C) (07/26/20 2026)  Pulse: 93 (07/26/20 2026)  Resp: 20 (07/26/20 2046)  BP: (!) 142/73 (07/26/20 2026)  SpO2: 99 % (07/26/20 2026)  O2 Device (Oxygen Therapy): room air (07/26/20 0835) Vital Signs (24h Range):  Temp:  [97.5 °F (36.4 °C)-98.4 °F (36.9 °C)] 98.4 °F (36.9 °C)  Pulse:  [82-95] 93  Resp:  [16-20] 20  SpO2:  [98 %-100 %] 99 %  BP: (136-147)/(66-86) 142/73     Weight: 87.1 kg (192 lb) (07/24/20 1553)  Body mass index is 31.95 kg/m².  Body surface area is 2 meters squared.    I/O last 3 completed shifts:  In: 320 [P.O.:320]  Out: 200 [Emesis/NG output:200]    Physical Exam  Vitals signs and nursing note reviewed.   Constitutional:       General: She is not in acute distress.     Appearance: Normal appearance. She is well-developed. She is not ill-appearing or diaphoretic.   HENT:      Head: Normocephalic and atraumatic.   Eyes:      General: No scleral icterus.        Right eye: No discharge.         Left eye: No discharge.   Cardiovascular:      Rate and Rhythm: Normal rate and regular rhythm.      Heart sounds: Normal heart sounds. No murmur. No friction rub.   Pulmonary:      Effort: Pulmonary effort is normal. No respiratory distress.      Breath sounds: Normal breath sounds. No wheezing or rales.   Abdominal:      General: There is no distension.      Palpations: Abdomen is soft. There is no mass.      Tenderness: There is no abdominal tenderness.   Musculoskeletal:         General: No deformity.      Right lower leg: No edema.      Left lower leg: No edema.   Skin:     General: Skin is warm and dry.      Findings: No rash.      Comments: L foot wound with bandages   Neurological:      Mental Status: She is alert and oriented to person, place, and time.   Psychiatric:         Mood and  Affect: Mood normal.         Behavior: Behavior normal.         Thought Content: Thought content normal.         Significant Labs:   CBC:   Recent Labs   Lab 07/25/20  0102   WBC 7.64   RBC 2.88*   HGB 8.6*   HCT 27.3*      MCV 95   MCH 29.9   MCHC 31.5*     CMP:   Recent Labs   Lab 07/22/20  0453  07/26/20  1224   *   < > 186*   CALCIUM 8.1*   < > 8.7   ALBUMIN 2.1*  --   --    *   < > 136   K 4.7   < > 4.2   CO2 19*   < > 28      < > 104   BUN 50*   < > 13   CREATININE 2.0*   < > 1.2    < > = values in this interval not displayed.     Microbiology Results (last 7 days)     Procedure Component Value Units Date/Time    Blood Culture #2 **CANNOT BE ORDERED STAT** [[id number]] Collected: 07/17/20 1712    Order Status: Completed Specimen: Blood from Peripheral, Hand, Left Updated: 07/22/20 1903     Blood Culture, Routine No growth after 5 days.    Blood Culture #1 **CANNOT BE ORDERED STAT** [[id number]] Collected: 07/17/20 1631    Order Status: Completed Specimen: Blood from Peripheral, Antecubital, Right Updated: 07/22/20 1903     Blood Culture, Routine No growth after 5 days.    Aerobic culture [[id number]]  (Abnormal)  (Susceptibility) Collected: 07/18/20 0929    Order Status: Completed Specimen: Bone from Foot, Left Updated: 07/21/20 0745     Aerobic Bacterial Culture ENTEROCOCCUS FAECALIS  Moderate        STREPTOCOCCUS AGALACTIAE (GROUP B)  Few  Beta-hemolytic streptococci are routinely susceptible to   penicillins,cephalosporins and carbapenems.        ENTEROBACTER CLOACAE  Rare          Recent Labs   Lab 07/22/20  0037   COLORU Straw   SPECGRAV 1.015   PHUR 5.0   PROTEINUA 2+*   BACTERIA None   NITRITE Negative   LEUKOCYTESUR Negative   UROBILINOGEN Negative   HYALINECASTS 0     All labs within the past 24 hours have been reviewed.    Significant Imaging:  X-Ray: Reviewed    Assessment/Plan:     Active Diagnoses:    Diagnosis Date Noted POA    PRINCIPAL PROBLEM:  Acute osteomyelitis of  left calcaneus [M86.172] 07/18/2020 Yes    Preop cardiovascular exam [Z01.810] 07/25/2020 Not Applicable    Hyponatremia [E87.1] 07/22/2020 Yes    Stage 3 chronic kidney disease [N18.3] 07/18/2020 Yes    Non healing left heel wound [S91.302A] 07/17/2020 Yes    S/P CABG x 1 [Z95.1] 01/27/2020 Not Applicable    Mixed hyperlipidemia [E78.2] 12/13/2019 Yes    Chronic combined systolic and diastolic heart failure [I50.42] 12/08/2019 Yes    Type 2 diabetes mellitus without complication, with long-term current use of insulin [E11.9, Z79.4] 05/05/2018 Not Applicable    Essential hypertension [I10] 05/05/2018 Yes    Peripheral artery disease [I73.9]  Yes      Problems Resolved During this Admission:       ROSLNY on CKD 3   - Has hx of HD dependant ROSLYN (LEVI) requiring RRT from 1/16/20 -3/10/20  - Recent baseline Crappears to be 1.4 -1.9 (GFR 30-43%); suspect underlying diabetic nephropathy Hgb A 1c > 14  - Cr stable, continue to hold lasix, weights are relatively stable but up overall, continue to hold IVF, consider restarting Po lasix as needed  - caution with IVF in advanced CKD patients - monitor O2 closely  - renally dose meds for current GFR/ avoid nephrotoxic meds  - strict Is/ Os/ daily weights  - daily labs while admitted     Hypervolemia   - continue to hold IVF, monitor I&Os, daily weights, consider restarting lasix PO as needed, currently volume status is stable and appears euvolemic     Hyperkalemia  - resolved     Metabolic Acidosis   - Bicarb trending up  - d/t ROSLYN + CKD  - continue to hold IVF, will convert bicitra to bicarb per patient preference and reduce dose  - AM labs     CKD MBD  - PTH 78 and Vit D 7, phos trending down. Replace today.   - continue ergo   - labs in AM     Hypomagnesemia  - Mg trending down   - Replace today  - Monitor      Proteinuria   - UPC 2.5 g   - would benefit fron ACE/ ARB once ROSLYN resolves and after surgical procedures     Hyperuricemia   - continue allopurinol 100 mg PO  QD    Acute osteomyelitis of L foot, non-healing wound  CAD s/p CABG  DM  PAD    Thank you for allowing me to participate in the care of your patient.  Please call with any questions.    Date of service: 7/26/2020  Karen Mojica NP   Nephrology  [facility]  Office [phone]   [facility]

## 2020-07-27 NOTE — PLAN OF CARE
Problem: Fall Injury Risk  Goal: Absence of Fall and Fall-Related Injury  Outcome: Ongoing, Progressing  Intervention: Identify and Manage Contributors to Fall Injury Risk  Flowsheets (Taken 7/27/2020 0605)  Self-Care Promotion:   independence encouraged   BADL personal objects within reach   BADL personal routines maintained  Medication Review/Management:   medications reviewed   high risk medications identified  Intervention: Promote Injury-Free Environment  Flowsheets (Taken 7/27/2020 0605)  Safety Promotion/Fall Prevention:   assistive device/personal item within reach   bed alarm refused   high risk medications identified   medications reviewed   nonskid shoes/socks when out of bed   toileting scheduled   instructed to call staff for mobility   supervised activity   Fall Risk reviewed with patient/family   lighting adjusted   No acute event overnight. No fall or any new skin injury noted. IV morphine given twice this shift. Dressing at the left foot remained clean and intact. Keep NPO for nuclear Stress test today and Vein mapping.  I_O monitor strictly. No further complaints made.

## 2020-07-27 NOTE — PROGRESS NOTES
[facility]  Podiatry  Progress Note    Patient Name: Suyapa Connelly  MRN: [id number]  Admission Date: 7/17/2020  Hospital Length of Stay: 10 days  Attending Physician: Corey Fisher MD  Primary Care Provider: Erlinda Rahman NP     Subjective:     History of Present Illness: 55 y/o male PMH DM2, PAD, CABG admitted for left heel infection. Reports wound started as a blister in Jan. 2020 after she was in a coma for 11 days following CABG procedure. Reports she was then discharged to rehab. Reports applying neosporin to left heel ulceration however the wound has progressively worsened in the past week. Reports she did not seek medical attention for the left heel wound prior due to COVID concerns as she was taking care of her elderly parents. Reports nausea today however reports this has been present for several weeks was taking zofran for this. Denies fevers, chills.      07/21/2020 patient seen at bedside.  She is resting comfortably.  She relates pain well controlled with current narcotic regimen.  She relates she is disappointed with finding that she has decreased blood flow to the foot.  No new pedal complaints.   She denies nausea, vomiting, fever, chills    7/22/20: Patient seen bedside. Heel protector boots in place. Pending angio per vascular surgery. Patient reports taking prednisone yesterday 2/2 allergic reaction to Cipro.     7/27/20: Patient seen bedside. Heel protector boot in place to LLE. No heel protector boot noted to RLE. S/p LLE angio on 7/24/20.     Scheduled Meds:   allopurinoL  100 mg Oral Daily    amiodarone  400 mg Oral Daily    aspirin  81 mg Oral Daily    atorvastatin  80 mg Oral Daily    cadexomer iodine   Topical (Top) Daily    carvediloL  6.25 mg Oral Daily    ceFEPime (MAXIPIME) IVPB  2 g Intravenous Q12H    clopidogreL  75 mg Oral Daily    ergocalciferol  50,000 Units Oral Q7 Days    gabapentin  100 mg Oral TID    heparin (porcine)  5,000 Units Subcutaneous  Q8H    insulin aspart U-100  8 Units Subcutaneous TID WM    insulin detemir U-100  15 Units Subcutaneous Daily    pantoprazole  40 mg Oral Daily    sodium bicarbonate  650 mg Oral Daily    vancomycin (VANCOCIN) IVPB  15 mg/kg Intravenous Q24H     Continuous Infusions:    PRN Meds:acetaminophen, acetaminophen, ALPRAZolam, dextrose 50%, diphenhydrAMINE, glucagon (human recombinant), insulin aspart U-100, melatonin, morphine, ondansetron, polyethylene glycol, sodium chloride 0.9%, Pharmacy to dose Vancomycin consult **AND** vancomycin - pharmacy to dose    Review of patient's allergies indicates:   Allergen Reactions    Ciprofloxacin      Itchiness    Contrast media      Kidney injury    Pcn [penicillins]      Rash; tolerated ceftriaxone on 1/13/20        Past Medical History:   Diagnosis Date    Anxiety     Depression     Diabetes mellitus     type 2    GERD (gastroesophageal reflux disease)     Hyperlipemia     Hypertension     Myocardial infarction 2010    minor-caused by stress per pt.     Past Surgical History:   Procedure Laterality Date    Angiogram - Right Extremity Right 7/9/15    angiogram-left leg  10/6/15    CATHETERIZATION OF BOTH LEFT AND RIGHT HEART N/A 12/18/2019    Procedure: CATHETERIZATION, HEART, BOTH LEFT AND RIGHT;  Surgeon: Que Fernando III, MD;  Location: [facility] CATH LAB;  Service: Cardiology;  Laterality: N/A;    CORONARY ANGIOGRAPHY N/A 12/18/2019    Procedure: ANGIOGRAM, CORONARY ARTERY;  Surgeon: Que Fernando III, MD;  Location: [facility] CATH LAB;  Service: Cardiology;  Laterality: N/A;    CORONARY ARTERY BYPASS GRAFT (CABG) N/A 1/14/2020    Procedure: CORONARY ARTERY BYPASS GRAFT (CABG) x 1     Off Pump;  Surgeon: Huang Altamirano MD;  Location: [facility] OR [address] [place] [address];  Service: Cardiovascular;  Laterality: N/A;    INSERTION OF TUNNELED CENTRAL VENOUS HEMODIALYSIS CATHETER N/A 1/27/2020    Procedure: Insertion, Catheter, Central Venous, Hemodialysis;  Surgeon: ESTEBAN BLANCA  Patricia KESSLER MD;  Location: [facility] CATH LAB;  Service: Peripheral Vascular;  Laterality: N/A;       Family History     None        Tobacco Use    Smoking status: Never Smoker    Smokeless tobacco: Never Used   Substance and Sexual Activity    Alcohol use: No    Drug use: Yes     Types: Marijuana     Comment: used yesterday    Sexual activity: Not on file     Review of Systems   Constitutional: Negative.    Respiratory: Negative.    Cardiovascular: Negative.    Gastrointestinal: Negative.    Genitourinary: Negative.  Genital sores: 7/27/20:   Musculoskeletal: Positive for arthralgias.   Skin: Positive for wound.   Neurological: Negative.  Syncope:    Headaches:      Psychiatric/Behavioral: Negative.      Objective:     Vital Signs (Most Recent):  Temp: 98.3 °F (36.8 °C) (07/27/20 0759)  Pulse: 90 (07/27/20 0759)  Resp: 18 (07/27/20 0759)  BP: (!) 161/67 (07/27/20 0759)  SpO2: 99 % (07/27/20 0759) Vital Signs (24h Range):  Temp:  [97.5 °F (36.4 °C)-98.4 °F (36.9 °C)] 98.3 °F (36.8 °C)  Pulse:  [82-93] 90  Resp:  [16-20] 18  SpO2:  [98 %-100 %] 99 %  BP: (136-161)/(67-79) 161/67     Weight: 88.5 kg (195 lb 1.7 oz)  Body mass index is 32.47 kg/m².    Foot Exam    General  Orientation: alert and oriented to person, place, and time       Right Foot/Ankle     Inspection and Palpation  Swelling: none     Neurovascular  Dorsalis pedis: 1+  Posterior tibial: 1+  Saphenous nerve sensation: diminished  Tibial nerve sensation: diminished  Superficial peroneal nerve sensation: diminished  Deep peroneal nerve sensation: diminished  Sural nerve sensation: diminished      Left Foot/Ankle      Inspection and Palpation  Tenderness: calcaneus tenderness   Skin Exam: ulcer;     Neurovascular  Dorsalis pedis: 1+  Posterior tibial: 1+  Saphenous nerve sensation: diminished  Tibial nerve sensation: diminished  Superficial peroneal nerve sensation: diminished  Deep peroneal nerve sensation: diminished  Sural nerve sensation:  diminished        7/27/20:  Wound 1: Left heel   Measurement:  5.3cmx5.3cmx0.5cm   Base: fibrous and necrotic eschar base  Periwound skin: HPK  Drainage: serous  Erythema: mild  Probe: probe to bone.         7/22/20:  Wound 1: Left heel   Measurement:  5.3cmx5.3cmx0.5cm   Base: fibrous and necrotic eschar base  Periwound skin: HPK  Drainage: serous  Erythema: mild  Probe: probe to bone.   Periwound maceration noted.             7/21/2020    Wound 1: Left heel   Measurement:  5.3cmx5.3cmx0.5cm   Base: fibrous and necrotic eschar base  Periwound skin: HPK  Drainage: serous  Erythema: mild  Probe: probe to bone.             7/18/20:      Pre debridement         Post debridement         Laboratory:  CBC:   Recent Labs   Lab 07/26/20  2334   WBC 9.49   RBC 3.01*   HGB 9.4*   HCT 29.5*   *   MCV 98   MCH 31.2*   MCHC 31.9*     CMP:   Recent Labs   Lab 07/22/20  0453  07/27/20  0518   *   < > 149*   CALCIUM 8.1*   < > 8.3*   ALBUMIN 2.1*  --   --    *   < > 136   K 4.7   < > 4.1   CO2 19*   < > 28      < > 104   BUN 50*   < > 14   CREATININE 2.0*   < > 1.3    < > = values in this interval not displayed.       Microbiology Results (last 7 days)     Procedure Component Value Units Date/Time    Blood Culture #2 **CANNOT BE ORDERED STAT** [[id number]] Collected: 07/17/20 1712    Order Status: Completed Specimen: Blood from Peripheral, Hand, Left Updated: 07/22/20 1903     Blood Culture, Routine No growth after 5 days.    Blood Culture #1 **CANNOT BE ORDERED STAT** [[id number]] Collected: 07/17/20 1631    Order Status: Completed Specimen: Blood from Peripheral, Antecubital, Right Updated: 07/22/20 1903     Blood Culture, Routine No growth after 5 days.    Aerobic culture [[id number]]  (Abnormal)  (Susceptibility) Collected: 07/18/20 0929    Order Status: Completed Specimen: Bone from Foot, Left Updated: 07/21/20 0745     Aerobic Bacterial Culture ENTEROCOCCUS FAECALIS  Moderate        STREPTOCOCCUS  AGALACTIAE (GROUP B)  Few  Beta-hemolytic streptococci are routinely susceptible to   penicillins,cephalosporins and carbapenems.        ENTEROBACTER CLOACAE  Rare            Diagnostic Results:  X-Ray Foot Complete Left  Order: [id number]  Status:  Final result   Visible to patient:  No (not released) Next appt:  None Dx:  Non healing left heel wound  Details    Reading Physician Reading Date Result Priority   Solomon ALYMable HuizarJuventinoDO  [phone]  [phone] 7/17/2020 STAT      Narrative & Impression     EXAMINATION:  XR FOOT COMPLETE 3 VIEW LEFT     CLINICAL HISTORY:  .  Unspecified open wound, left foot, initial encounter     TECHNIQUE:  AP, lateral and oblique views of the left foot were performed.     COMPARISON:  None     FINDINGS:  No evidence for acute fracture line or dislocation of the left foot.  Prominent mixed density opacity overlies the 1st distal phalanx with radiopaque and soft tissue density overall indeterminate.  Clinical correlation advised.     There is a large lucency projected over the calcaneal soft tissues concerning for possible ulceration.  No definite bony erosion of the underlying calcaneus.  Clinical correlation and further evaluation with MRI as warranted if patient compatible.  There is prominent vascular calcifications within the soft tissues.     Impression:     Please see above              MRI Foot (Hindfoot) Left Without Contrast  Order: [id number]  Status:  Final result   Visible to patient:  No (not released) Next appt:  None  Details    Reading Physician Reading Date Result Priority   Ayesha Lindo MD  [phone] 7/17/2020 STAT      Narrative & Impression     EXAMINATION:  MRI FOOT (HINDFOOT) LEFT WITHOUT CONTRAST     CLINICAL HISTORY:  Osteomyelitis suspected, diabetic;calcaneus osteo;     TECHNIQUE:  Multiplanar, multisequence images were obtained of the left hind foot without the use of IV contrast.     COMPARISON:  Left foot radiographs from the same  date.     FINDINGS:  Osseous edema with cortical indistinctness and T1 hypointensity are seen involving the posterior aspect of the calcaneus consistent with osteomyelitis, noting overlying ulceration seen posteriorly in this region.  Achilles tendon is intact.  Remaining visualized osseous structures show no significant osseous edema, fracture, or marrow replacement process.  No focal fluid collections seen.  There is posterior subcutaneous edema.  No significant ankle joint effusion.  Mild edema is also noted in Kager's fat pad.     Impression:     Osteomyelitis involving the posterior aspect of the calcaneus.           US Lower Extremity Arteries Bilateral  Order: [id number]  Status:  Final result   Visible to patient:  No (not released) Next appt:  None Dx:  PAD (peripheral artery disease)  Details    Reading Physician Reading Date Result Priority   Ayesha Lindo MD  [phone] 7/17/2020 STAT      Narrative & Impression     EXAMINATION:  US LOWER EXTREMITY ARTERIES BILATERAL     CLINICAL HISTORY:  Peripheral vascular disease, unspecified     TECHNIQUE:  Bilateral spectral, color and grayscale images of the large arteries of both lower extremities were performed.     COMPARISON:  CT lower extremity runoff from 01/03/2020.     FINDINGS:  Right lower extremity:     Common femoral artery: 128 cm/sec, biphasic     SFA proximal: 210 cm/sec, triphasic     SFA mid: 105 cm/sec, triphasic     SFA distal: 86 cm/sec, biphasic     Popliteal artery graft: Patent with velocity measuring 479 cm/sec at the proximal anastomosis, with velocities ranging from 49-54 cm/sec within the proximal to distal portions of the graft with monophasic waveform seen throughout.     Posterior tibial artery: 24 cm/sec     Anterior tibial artery: 55 cm/sec     Left lower extremity:     Common femoral artery to popliteal graft: Patent with velocity measuring 184 cm/sec at the proximal anastomosis with triphasic waveforms.  Monophasic waveforms  are seen distally within the graft with velocities measuring 67 cm/sec proximally, 25 cm/sec midportion, and 53 cm/sec distally.     SFA proximal: 15 cm/sec, monophasic     SFA mid: 67 cm/sec, monophasic     SFA distal: 175 cm/sec, monophasic     Popliteal artery: 28 cm/sec, monophasic     Posterior tibial artery: 17 cm/sec     Anterior tibial artery: 57 cm/sec     Impression:     1. Patent right lower extremity popliteal artery bypass graft.  Elevated velocities seen at the proximal anastomosis measuring 479 cm/sec consistent with hemodynamically significant stenosis.  2. Patent left lower extremity common femoral artery to popliteal artery bypass graft.  Doubling of velocities seen within the mid to distal portion of the graft suggestive for hemodynamically significant stenosis.  3. Doubling of velocities with hemodynamically significant stenosis between the left popliteal artery and anterior tibial artery.                 Left   Left arm  mmHg      Left posterior tibial 53 mmHg      Left dorsalis pedis 255 mmHg      Left ATTILA 2.13       Left toe pressure 0 mmHg      Left TBI 0             Clinical Findings:  Left heel ulceration with probe to bone.    Assessment/Plan:     Active Diagnoses:    Diagnosis Date Noted POA    PRINCIPAL PROBLEM:  Acute osteomyelitis of left calcaneus [M86.172] 07/18/2020 Yes    Preop cardiovascular exam [Z01.810] 07/25/2020 Not Applicable    Hyponatremia [E87.1] 07/22/2020 Yes    Stage 3 chronic kidney disease [N18.3] 07/18/2020 Yes    Non healing left heel wound [S91.302A] 07/17/2020 Yes    S/P CABG x 1 [Z95.1] 01/27/2020 Not Applicable    Mixed hyperlipidemia [E78.2] 12/13/2019 Yes    Chronic combined systolic and diastolic heart failure [I50.42] 12/08/2019 Yes    Type 2 diabetes mellitus without complication, with long-term current use of insulin [E11.9, Z79.4] 05/05/2018 Not Applicable    Essential hypertension [I10] 05/05/2018 Yes    Peripheral artery disease  [I73.9]  Yes      Problems Resolved During this Admission:       Discussed two options with patient. L BKA vs IV abx for six weeks with aggressive wound care for several months with no guarantee of wound resolution.  Patient would like to try IV abx, wound care at this time.     ID on board to tailor abx.     Arterial studies show severe PAOD, vascular surgery on board with plans for intervention.    S/p LLE angio on 7/24/20 . Plan for bypass procedure pending clearance from Cardiology.     Debridement left foot can be done as outpatient. Ok for discharge from podiatry standpoint.     Podiatry will follow.     Continue Nursing dressing changes.     If still inpatient will see patient on 7/31/20.     Thank you for your consult. I will follow-up with patient. Please contact us if you have any additional questions.    Rosio Mayes DPM  Podiatry  [facility]

## 2020-07-27 NOTE — PROGRESS NOTES
[facility] Hospital Medicine  Progress Note    Patient Name: Suyapa Connelly  MRN: [id number]  Patient Class: IP- Inpatient   Admission Date: 7/17/2020  Length of Stay: 10 days  Attending Physician: Corey Fisher MD  Primary Care Provider: Erlinda Rahman NP        Subjective:     Principal Problem:Acute osteomyelitis of left calcaneus        HPI:  Suyapa Connelly is a 54 y.o. female that (in part)  has a past medical history of Anxiety, Depression, Diabetes mellitus, GERD (gastroesophageal reflux disease), Hyperlipemia, Hypertension, and Myocardial infarction. Patient has a past surgical history that includes Angiogram - Right Extremity (Right, 7/9/15); angiogram-left leg (10/6/15); Catheterization of both left and right heart (N/A, 12/18/2019); Coronary angiography (N/A, 12/18/2019); Coronary Artery Bypass Graft (CABG) (N/A, 1/14/2020); and Insertion of tunneled central venous hemodialysis catheter (N/A, 1/27/2020). Presents to [facility] Emergency Department complaining of the left worsening heel foot wound. Poor wound healing.  Patient became concerned due to malodorous drainage.  Pain with ambulation and weight-bearing.  Associated hyperglycemia.  Denies fever chills.    In the emergency department physical exam concerning for osteomyelitis secondary to diabetic foot ulcer in the setting of uncontrolled diabetes.  Routine laboratory studies revealed hyperglycemia.  X-ray of the foot performed.  Concerning for osteomyelitis.  MRI confirmed this.  Podiatry was consulted.  Broad-spectrum antibiotics were initiated in the ED a special consideration given to risk of Pseudomonas infection.    Hospital medicine has been asked to admit to inpatient for further evaluation and treatment.     Overview/Hospital Course:  Kyaw Connelly is a 54 y.o. female that (in part)  has a past medical history of Anxiety, Depression, Diabetes mellitus, GERD (gastroesophageal reflux disease),  Hyperlipemia, Hypertension, and Myocardial infarction.  has a past surgical history that includes Angiogram - Right Extremity (Right, 7/9/15); angiogram-left leg (10/6/15); Catheterization of both left and right heart (N/A, 12/18/2019); Coronary angiography (N/A, 12/18/2019); Coronary Artery Bypass Graft (CABG) (N/A, 1/14/2020); and Insertion of tunneled central venous hemodialysis catheter (N/A, 1/27/2020). Presents to [facility] Emergency Department complaining of the left heel foot wound.  MRI confirmed osteo. Podiatry, ID, and vascular were consulted.  Podiatry discussed options with patient. She has chosen 6 weeks of IV abx. ID has been consulted to tailor abx. She has been continued on Aztreonam.  Blood cultures were NG. Bone bx on 7/18 has grown Enterococcus faecalis, Grp B Strep, and Enterobacter cloacae. Vascular Surgery consulted and did an angiogram which revealed adequate artery for access the left SFA and AK popliteal artery occlusion.  Patient with renal failure and Nephrology consulted.  Renal function continues to improve and ok to proceed with vascular intervention.  Podiatry planning debridement post vascular procedure.  Creatinine peaked at 2.4 and is currently 1.2.      Interval History: No CP, nausea resolved    Review of Systems   Respiratory: Negative for shortness of breath.    Cardiovascular: Negative for chest pain.   Gastrointestinal: Negative for diarrhea and vomiting.     Objective:     Vital Signs (Most Recent):  Temp: 98.3 °F (36.8 °C) (07/27/20 0759)  Pulse: 90 (07/27/20 0759)  Resp: 18 (07/27/20 0759)  BP: (!) 161/67 (07/27/20 0759)  SpO2: 99 % (07/27/20 0759) Vital Signs (24h Range):  Temp:  [97.5 °F (36.4 °C)-98.4 °F (36.9 °C)] 98.3 °F (36.8 °C)  Pulse:  [82-93] 90  Resp:  [18-20] 18  SpO2:  [99 %-100 %] 99 %  BP: (136-161)/(67-79) 161/67     Weight: 88.5 kg (195 lb 1.7 oz)  Body mass index is 32.47 kg/m².    Intake/Output Summary (Last 24 hours) at  7/27/2020 1310  Last data filed at 7/27/2020 0547  Gross per 24 hour   Intake 120 ml   Output 1000 ml   Net -880 ml      Physical Exam  Vitals signs and nursing note reviewed.   Constitutional:       General: She is not in acute distress.     Appearance: She is well-developed. She is obese.   HENT:      Head: Normocephalic and atraumatic.      Right Ear: Hearing and external ear normal.      Left Ear: Hearing and external ear normal.      Nose: Nose normal.      Mouth/Throat:      Mouth: Mucous membranes are moist.      Pharynx: Oropharynx is clear. Uvula midline. No oropharyngeal exudate.   Eyes:      Conjunctiva/sclera: Conjunctivae normal.      Pupils: Pupils are equal, round, and reactive to light.   Neck:      Musculoskeletal: Normal range of motion and neck supple.      Thyroid: No thyromegaly.      Trachea: No tracheal deviation.   Cardiovascular:      Rate and Rhythm: Normal rate and regular rhythm.      Pulses:           Left popliteal pulse not accessible.        Dorsalis pedis pulses are 1+ on the right side.      Heart sounds: Normal heart sounds. No murmur.   Pulmonary:      Effort: Pulmonary effort is normal. No respiratory distress.      Breath sounds: Normal breath sounds.   Abdominal:      General: Bowel sounds are normal.      Palpations: Abdomen is soft.      Tenderness: There is no abdominal tenderness.   Musculoskeletal: Normal range of motion.         General: No tenderness.      Comments: Left foot wrapped with gauze dressing.  Unable to palpate pulses.   Feet:      Comments: Left foot bandage CDI  Skin:     General: Skin is warm and dry.      Coloration: Skin is not pale.   Neurological:      General: No focal deficit present.      Mental Status: She is alert and oriented to person, place, and time. Mental status is at baseline.   Psychiatric:         Attention and Perception: Attention normal.         Mood and Affect: Mood normal.         Speech: Speech normal.         Behavior: Behavior  normal.         Thought Content: Thought content normal.         Judgment: Judgment normal.         Significant Labs: All pertinent labs within the past 24 hours have been reviewed.    Significant Imaging: I have reviewed and interpreted all pertinent imaging results/findings within the past 24 hours.      Assessment/Plan:      * Acute osteomyelitis of left calcaneus  - MRI confirms osteo. Bone Bx- 7/18  - Afebrile and with resolved leukocytosis   - Wound cultures with Enterococcus, E cloacae, and GBS.   - ID consulted for ABx recommendations and will d/c axtreoman and start cefepime 2g q12h; continue vancomycin   - Vascular Surgery consulted and planning angiogram 7/24/2020.  - Patient declines amputation and wants to try conservative tx with  IV ABx's.   - CKD and Nephrology consulted.  Vascular discussed case with Nephrology and patient cleared from renal for vascular intervention.    - Podiatry planning debridement, if she remains 7/31 they will come assess for any procedure.  - Vascular surgery following with workup for revascularization procedure that she will need for healing. Vein mapping and Angiogram complete, uncertain timing for revascularization procedure  -stress test today to complete cardiac workup           Preop cardiovascular exam        Hyponatremia  - resolved  - Monitor with daily labs      Stage 3 chronic kidney disease  Appreciate Nephrology input.  Patient did have acute on chronic CKD 3 which has now resolved  Hyperkalemia on 7/21.  Treated with Calcium, Albuterol and Kayexalate.  Resolved         Non healing left heel wound  Continue current wound care  Provide adequate analgesia prior to daily wound care      S/P CABG x 1  No acute issues     -stress test today for cardiac clearance    Mixed hyperlipidemia  Stable      Chronic combined systolic and diastolic heart failure  No acute issues   EF 50% on recent echo         Essential hypertension  Continue current management.  Adequate  control      Type 2 diabetes mellitus without complication, with long-term current use of insulin  Uncontrolled with hyperglycemia. Manifestations likely of CKD 3      - Will check an A1c- > 14.  - Glucose increased secondary to steroids.  - Added insulin with meals and will increase prandial insulin to 8U and increased basal to 15U for better control of blood glucose   -improved control           Peripheral artery disease  Patient requires revascularization of the left lower extremity. Angiogram completed 7/24 with bilateral occlusive disease. Vascular surgery following and will need revascularization        VTE Risk Mitigation (From admission, onward)         Ordered     heparin (porcine) injection 5,000 Units  Every 8 hours      07/17/20 2217     IP VTE HIGH RISK PATIENT  Once      07/17/20 2217     Place sequential compression device  Until discontinued      07/17/20 2217     Place HAILEY hose  Until discontinued      07/17/20 2217                      Corey Fisher MD  Department of Hospital Medicine   [facility]

## 2020-07-27 NOTE — SUBJECTIVE & OBJECTIVE
Interval History: No CP, nausea resolved    Review of Systems   Respiratory: Negative for shortness of breath.    Cardiovascular: Negative for chest pain.   Gastrointestinal: Negative for diarrhea and vomiting.     Objective:     Vital Signs (Most Recent):  Temp: 98.3 °F (36.8 °C) (07/27/20 0759)  Pulse: 90 (07/27/20 0759)  Resp: 18 (07/27/20 0759)  BP: (!) 161/67 (07/27/20 0759)  SpO2: 99 % (07/27/20 0759) Vital Signs (24h Range):  Temp:  [97.5 °F (36.4 °C)-98.4 °F (36.9 °C)] 98.3 °F (36.8 °C)  Pulse:  [82-93] 90  Resp:  [18-20] 18  SpO2:  [99 %-100 %] 99 %  BP: (136-161)/(67-79) 161/67     Weight: 88.5 kg (195 lb 1.7 oz)  Body mass index is 32.47 kg/m².    Intake/Output Summary (Last 24 hours) at 7/27/2020 1310  Last data filed at 7/27/2020 0547  Gross per 24 hour   Intake 120 ml   Output 1000 ml   Net -880 ml      Physical Exam  Vitals signs and nursing note reviewed.   Constitutional:       General: She is not in acute distress.     Appearance: She is well-developed. She is obese.   HENT:      Head: Normocephalic and atraumatic.      Right Ear: Hearing and external ear normal.      Left Ear: Hearing and external ear normal.      Nose: Nose normal.      Mouth/Throat:      Mouth: Mucous membranes are moist.      Pharynx: Oropharynx is clear. Uvula midline. No oropharyngeal exudate.   Eyes:      Conjunctiva/sclera: Conjunctivae normal.      Pupils: Pupils are equal, round, and reactive to light.   Neck:      Musculoskeletal: Normal range of motion and neck supple.      Thyroid: No thyromegaly.      Trachea: No tracheal deviation.   Cardiovascular:      Rate and Rhythm: Normal rate and regular rhythm.      Pulses:           Left popliteal pulse not accessible.        Dorsalis pedis pulses are 1+ on the right side.      Heart sounds: Normal heart sounds. No murmur.   Pulmonary:      Effort: Pulmonary effort is normal. No respiratory distress.      Breath sounds: Normal breath sounds.   Abdominal:      General: Bowel  sounds are normal.      Palpations: Abdomen is soft.      Tenderness: There is no abdominal tenderness.   Musculoskeletal: Normal range of motion.         General: No tenderness.      Comments: Left foot wrapped with gauze dressing.  Unable to palpate pulses.   Feet:      Comments: Left foot bandage CDI  Skin:     General: Skin is warm and dry.      Coloration: Skin is not pale.   Neurological:      General: No focal deficit present.      Mental Status: She is alert and oriented to person, place, and time. Mental status is at baseline.   Psychiatric:         Attention and Perception: Attention normal.         Mood and Affect: Mood normal.         Speech: Speech normal.         Behavior: Behavior normal.         Thought Content: Thought content normal.         Judgment: Judgment normal.         Significant Labs: All pertinent labs within the past 24 hours have been reviewed.    Significant Imaging: I have reviewed and interpreted all pertinent imaging results/findings within the past 24 hours.

## 2020-07-27 NOTE — PLAN OF CARE
Recommendations  1. Add Geremias packet BID.   2. Encourage good diet compliance.  Goals: Pt to consume >75% of meals by RD follow up.  Nutrition Goal Status: goal met

## 2020-07-27 NOTE — NURSING
Report received from DEANGELO Whitley. Patient resting comfortably in bed,  no acute distress noted. Patient is awake and alert. Dressing at left heel is clean and intact. Plan of care reviewed with patient. Instructed patient to call for assistance before ambulating, side rails up x2, bed alarm set, call light in reach, non skid socks in use. MIdline at right upper arm site no redness or swelling noted.  Patient verbalized understanding of instructions. Will continue to monitor.

## 2020-07-27 NOTE — ASSESSMENT & PLAN NOTE
Cardiology consultation has been obtained for preoperative risk assessment prior to surgery.  Patient does have moderate OM2 disease.  Plans were to revascularized that prior to surgery.  I personally reviewed the coronary angiogram and it has moderate disease.  Unknown ischemic significance.  Patient can not walk around much because of the left foot.  We will look at the ischemic significance of this lesion with the help of a stress test.  So that appropriate risk assessment can be made prior to vascular surgery.    July 26, 2020:  Vascular surgery planned in 2-3 weeks as per Dr. adams.  Further evaluation with the help stress test in a.m..    July 27, 2020:  Stress test today

## 2020-07-27 NOTE — SUBJECTIVE & OBJECTIVE
Interval History:  Doing fine.  Denies any chest pains.  Denies shortness of breath.    Review of Systems   Constitution: Negative.   HENT: Negative.    Eyes: Negative.    Cardiovascular: Positive for claudication.   Respiratory: Negative.    Endocrine: Negative.    Hematologic/Lymphatic: Negative.    Skin: Positive for poor wound healing.   Musculoskeletal: Negative.    Gastrointestinal: Positive for nausea and vomiting.   Genitourinary: Negative.    Neurological: Negative.    Psychiatric/Behavioral: Negative.    Allergic/Immunologic: Negative.      Objective:     Vital Signs (Most Recent):  Temp: 98.3 °F (36.8 °C) (07/27/20 0759)  Pulse: 90 (07/27/20 0759)  Resp: 18 (07/27/20 0759)  BP: (!) 161/67 (07/27/20 0759)  SpO2: 99 % (07/27/20 0759) Vital Signs (24h Range):  Temp:  [97.5 °F (36.4 °C)-98.4 °F (36.9 °C)] 98.3 °F (36.8 °C)  Pulse:  [82-93] 90  Resp:  [18-20] 18  SpO2:  [99 %-100 %] 99 %  BP: (136-161)/(67-79) 161/67     Weight: 88.5 kg (195 lb 1.7 oz)  Body mass index is 32.47 kg/m².     SpO2: 99 %  O2 Device (Oxygen Therapy): room air      Intake/Output Summary (Last 24 hours) at 7/27/2020 1127  Last data filed at 7/27/2020 0547  Gross per 24 hour   Intake 220 ml   Output 1000 ml   Net -780 ml       Lines/Drains/Airways     Peripheral Intravenous Line                 Midline Catheter Insertion/Assessment  - Single Lumen 07/18/20 1640 basilic vein (medial side of arm) 18g x 10cm 8 days                Physical Exam   Constitutional: She is oriented to person, place, and time. She appears well-developed and well-nourished.   HENT:   Head: Normocephalic.   Eyes: Pupils are equal, round, and reactive to light.   Neck: Normal range of motion. Neck supple.   Cardiovascular: Normal rate and regular rhythm.   Pulmonary/Chest: Effort normal and breath sounds normal.   Abdominal: Soft. Normal appearance and bowel sounds are normal. There is no abdominal tenderness.   Musculoskeletal:      Comments: Left lower extremity  in wound care dressing   Neurological: She is alert and oriented to person, place, and time.   Skin: Skin is warm.   Psychiatric: She has a normal mood and affect.   Nursing note and vitals reviewed.      Significant Labs:   BMP:   Recent Labs   Lab 07/26/20  0635 07/26/20  1224 07/27/20  0518   GLU  --  186* 149*   NA  --  136 136   K  --  4.2 4.1   CL  --  104 104   CO2  --  28 28   BUN  --  13 14   CREATININE  --  1.2 1.3   CALCIUM  --  8.7 8.3*   MG 1.6  --  1.8   , CMP   Recent Labs   Lab 07/26/20  1224 07/27/20  0518    136   K 4.2 4.1    104   CO2 28 28   * 149*   BUN 13 14   CREATININE 1.2 1.3   CALCIUM 8.7 8.3*   ANIONGAP 4* 4*   ESTGFRAFRICA 60 54*   EGFRNONAA 52* 47*   , CBC   Recent Labs   Lab 07/26/20  2334   WBC 9.49   HGB 9.4*   HCT 29.5*   *   , INR No results for input(s): INR, PROTIME in the last 48 hours., Lipid Panel No results for input(s): CHOL, HDL, LDLCALC, TRIG, CHOLHDL in the last 48 hours., Troponin No results for input(s): TROPONINI in the last 48 hours. and All pertinent lab results from the last 24 hours have been reviewed.    Significant Imaging: Echocardiogram:   Transthoracic echo (TTE) complete (Cupid Only):   Results for orders placed or performed during the hospital encounter of 07/17/20   Echo Color Flow Doppler? Yes   Result Value Ref Range    Ascending aorta 2.65 cm    STJ 2.48 cm    AV mean gradient 5 mmHg    Ao peak ty 1.43 m/s    Ao VTI 37.05 cm    IVRT 121.11 msec    IVS 1.39 (A) 0.6 - 1.1 cm    LA size 3.69 cm    Left Atrium Major Axis 5.22 cm    Left Atrium Minor Axis 5.09 cm    LVIDD 4.34 3.5 - 6.0 cm    LVIDS 3.32 2.1 - 4.0 cm    LVOT diameter 2.08 cm    LVOT peak VTI 25.57 cm    PW 1.36 (A) 0.6 - 1.1 cm    MV Peak A Ty 1.05 m/s    E wave decelartion time 205.80 msec    MV Peak E Ty 0.76 m/s    PV Peak D Ty 0.28 m/s    PV Peak S Ty 0.60 m/s    RA Major Axis 5.20 cm    RA Width 3.92 cm    RVDD 3.73 cm    Sinus 2.72 cm    TAPSE 1.40 cm    TR  Max Ty 2.90 m/s    LA WIDTH 4.49 cm    Ao root annulus 2.92 cm    AORTIC VALVE CUSP SEPERATION 1.49 cm    PV PEAK VELOCITY 0.88 cm/s    LV Diastolic Volume 84.78 mL    LV Systolic Volume 44.71 mL    RV S' 10.71 cm/s    LVOT peak ty 0.99 m/s    FS 24 %    LA volume 72.59 cm3    LV mass 229.11 g    Left Ventricle Relative Wall Thickness 0.63 cm    AV valve area 2.34 cm2    AV Velocity Ratio 0.69     AV index (prosthetic) 0.69     E/A ratio 0.72     Pulm vein S/D ratio 2.14     LVOT area 3.4 cm2    LVOT stroke volume 86.84 cm3    AV peak gradient 8 mmHg    LV Systolic Volume Index 23.0 mL/m2    LV Diastolic Volume Index 43.61 mL/m2    LA Volume Index 37.3 mL/m2    LV Mass Index 118 g/m2    Triscuspid Valve Regurgitation Peak Gradient 34 mmHg    BSA 2 m2    Right Atrial Pressure (from IVC) 3 mmHg    TV rest pulmonary artery pressure 37 mmHg    Narrative    · Low normal left ventricular systolic function. The estimated ejection   fraction is 50%.  · Concentric left ventricular hypertrophy.  · Indeterminate left ventricular diastolic function.  · Normal right ventricular systolic function.  · Mild left atrial enlargement.  · Mild aortic regurgitation.  · Mild mitral regurgitation.  · Mild tricuspid regurgitation.  · Normal central venous pressure (3 mmHg).  · The estimated PA systolic pressure is 37 mmHg.

## 2020-07-27 NOTE — PROGRESS NOTES
"[facility]  Adult Nutrition  Progress Note    SUMMARY       Recommendations  1. Add Geremias packet BID.   2. Encourage good diet compliance.  Goals: Pt to consume >75% of meals by RD follow up.  Nutrition Goal Status: goal met  Communication of RD Recs: (plan of care)    Reason for Assessment  Reason For Assessment: length of stay  Diagnosis: infection/sepsis, diabetes diagnosis/complications, renal disease(non healing L heel wound, hyperglycemia, ROSLYN)  Relevant Medical History: anxiety, depression, DM2, GERD, HLD, HTN, MI, osteomyelitis of L foot  General Information Comments: Pt admitted with ROSLYN and non-healing L heel wound, bone is exposed. Pt reports good appetite and intake although in pain from heel. Podiatry following. Chart shows up and down wt fluctuation from 178lb to 218lb in past 6 months. NFPE 7/27-Pt with signs of mild muscle losses but overall nourished for age and disease state.  Nutrition Discharge Planning: Adequate PO intake via Diabetic diet.    Nutrition Risk Screen  Nutrition Risk Screen: no indicators present    Nutrition/Diet History  Factors Affecting Nutritional Intake: pain    Anthropometrics  Temp: 98.3 °F (36.8 °C)  Height Method: Stated  Height: 5' 5" (165.1 cm)  Height (inches): 65 in  Weight Method: Bed Scale  Weight: 88.5 kg (195 lb 1.7 oz)  Weight (lb): 195.11 lb  Ideal Body Weight (IBW), Female: 125 lb  % Ideal Body Weight, Female (lb): 153.6 %  BMI (Calculated): 32.5  BMI Grade: 30 - 34.9- obesity - grade I     Lab/Procedures/Meds  Pertinent Labs Reviewed: reviewed  Pertinent Labs Comments: GFR 54, Glu 149, Norm 8.3, Phos 2.6, A1C >14  Pertinent Medications Reviewed: reviewed  Pertinent Medications Comments: Statin, egocalciferol, heparin, insulin, pantoprazole, vancomycin    Physical Findings/Assessment  Carlos score 16     Estimated/Assessed Needs  Weight Used For Calorie Calculations: 88.5 kg (195 lb 1.7 oz)  Energy Calorie Requirements (kcal): 1862 kcal " daily  Energy Need Method: (x 1.25 PAL)  Protein Requirements: 106-133 gm daily  Weight Used For Protein Calculations: 88.5 kg (195 lb 1.7 oz)(1.2-1.5 gm/kg)  Fluid Requirements (mL): 1 mL/kcal or per MD  Estimated Fluid Requirement Method: RDA Method  RDA Method (mL): 1862  CHO Requirement: 233 gm daily    Nutrition Prescription Ordered  Current Diet Order: 2000-diabetic    Evaluation of Received Nutrient/Fluid Intake    Comments: LBM 7/25  % Intake of Estimated Energy Needs: 75 - 100 %  % Meal Intake: 75 - 100 %    Nutrition Risk  Level of Risk/Frequency of Follow-up: low(f/u 1x/weekly)     Assessment and Plan  Nutrition Problem  Increased nutrient needs, protein    Related to (etiology):   Wound healing    Signs and Symptoms (as evidenced by):   Non-healing L heel wound    Interventions(treatment strategy):  Collaboration with other providers  [employer] beverage-nalini BID    Nutrition Diagnosis Status:   New    Monitor and Evaluation    Food and Nutrient Intake: energy intake  Food and Nutrient Adminstration: diet order  Physical Activity and Function: nutrition-related ADLs and IADLs  Anthropometric Measurements: weight, weight change  Biochemical Data, Medical Tests and Procedures: electrolyte and renal panel, gastrointestinal profile, glucose/endocrine profile, inflammatory profile, lipid profile  Nutrition-Focused Physical Findings: overall appearance     Malnutrition Assessment  Dorsal Hand (Muscle Loss): mild depletion  Posterior Calf Region (Muscle Loss): mild depletion   Subcutaneous Fat Loss (Final Summary): well nourished  Muscle Loss Evaluation (Final Summary): mild protein-calorie malnutrition      Nutrition Follow-Up    RD Follow-up?: Yes

## 2020-07-28 LAB
ANION GAP SERPL CALC-SCNC: 5 MMOL/L (ref 8–16)
BUN SERPL-MCNC: 17 MG/DL (ref 6–20)
CALCIUM SERPL-MCNC: 8.5 MG/DL (ref 8.7–10.5)
CHLORIDE SERPL-SCNC: 105 MMOL/L (ref 95–110)
CO2 SERPL-SCNC: 26 MMOL/L (ref 23–29)
CREAT SERPL-MCNC: 1.3 MG/DL (ref 0.5–1.4)
EST. GFR  (AFRICAN AMERICAN): 54 ML/MIN/1.73 M^2
EST. GFR  (NON AFRICAN AMERICAN): 47 ML/MIN/1.73 M^2
GLUCOSE SERPL-MCNC: 104 MG/DL (ref 70–110)
MAGNESIUM SERPL-MCNC: 1.8 MG/DL (ref 1.6–2.6)
PHOSPHATE SERPL-MCNC: 2.6 MG/DL (ref 2.7–4.5)
POC ACTIVATED CLOTTING TIME K: 180 SEC (ref 74–137)
POC ACTIVATED CLOTTING TIME K: 186 SEC (ref 74–137)
POC ACTIVATED CLOTTING TIME K: 191 SEC (ref 74–137)
POC ACTIVATED CLOTTING TIME K: 213 SEC (ref 74–137)
POC ACTIVATED CLOTTING TIME K: 235 SEC (ref 74–137)
POC ACTIVATED CLOTTING TIME K: 268 SEC (ref 74–137)
POCT GLUCOSE: 230 MG/DL (ref 70–110)
POCT GLUCOSE: 265 MG/DL (ref 70–110)
POCT GLUCOSE: 97 MG/DL (ref 70–110)
POTASSIUM SERPL-SCNC: 4.2 MMOL/L (ref 3.5–5.1)
SAMPLE: ABNORMAL
SODIUM SERPL-SCNC: 136 MMOL/L (ref 136–145)
VANCOMYCIN TROUGH SERPL-MCNC: 16.2 UG/ML (ref 10–22)

## 2020-07-28 PROCEDURE — 92978 PR IVUS, CORONARY, 1ST VESSEL: ICD-10-PCS | Mod: 26,LC,, | Performed by: INTERNAL MEDICINE

## 2020-07-28 PROCEDURE — 25000003 PHARM REV CODE 250: Performed by: INTERNAL MEDICINE

## 2020-07-28 PROCEDURE — 63600175 PHARM REV CODE 636 W HCPCS: Performed by: INTERNAL MEDICINE

## 2020-07-28 PROCEDURE — 25000003 PHARM REV CODE 250: Performed by: HOSPITALIST

## 2020-07-28 PROCEDURE — 25000003 PHARM REV CODE 250: Performed by: NURSE PRACTITIONER

## 2020-07-28 PROCEDURE — 99152 MOD SED SAME PHYS/QHP 5/>YRS: CPT | Performed by: INTERNAL MEDICINE

## 2020-07-28 PROCEDURE — 80048 BASIC METABOLIC PNL TOTAL CA: CPT

## 2020-07-28 PROCEDURE — 99152 MOD SED SAME PHYS/QHP 5/>YRS: CPT | Mod: ,,, | Performed by: INTERNAL MEDICINE

## 2020-07-28 PROCEDURE — 94761 N-INVAS EAR/PLS OXIMETRY MLT: CPT

## 2020-07-28 PROCEDURE — 83735 ASSAY OF MAGNESIUM: CPT

## 2020-07-28 PROCEDURE — 92978 ENDOLUMINL IVUS OCT C 1ST: CPT | Mod: LC | Performed by: INTERNAL MEDICINE

## 2020-07-28 PROCEDURE — 84100 ASSAY OF PHOSPHORUS: CPT

## 2020-07-28 PROCEDURE — 85347 COAGULATION TIME ACTIVATED: CPT | Performed by: INTERNAL MEDICINE

## 2020-07-28 PROCEDURE — C9399 UNCLASSIFIED DRUGS OR BIOLOG: HCPCS | Performed by: HOSPITALIST

## 2020-07-28 PROCEDURE — C1894 INTRO/SHEATH, NON-LASER: HCPCS | Performed by: INTERNAL MEDICINE

## 2020-07-28 PROCEDURE — 99152 PR MOD CONSCIOUS SEDATION, SAME PHYS, 5+ YRS, FIRST 15 MIN: ICD-10-PCS | Mod: ,,, | Performed by: INTERNAL MEDICINE

## 2020-07-28 PROCEDURE — 25500020 PHARM REV CODE 255: Performed by: INTERNAL MEDICINE

## 2020-07-28 PROCEDURE — 21400001 HC TELEMETRY ROOM

## 2020-07-28 PROCEDURE — 92928 PRQ TCAT PLMT NTRAC ST 1 LES: CPT | Mod: LC,,, | Performed by: INTERNAL MEDICINE

## 2020-07-28 PROCEDURE — 92978 ENDOLUMINL IVUS OCT C 1ST: CPT | Mod: 26,LC,, | Performed by: INTERNAL MEDICINE

## 2020-07-28 PROCEDURE — C1769 GUIDE WIRE: HCPCS | Performed by: INTERNAL MEDICINE

## 2020-07-28 PROCEDURE — C1725 CATH, TRANSLUMIN NON-LASER: HCPCS | Performed by: INTERNAL MEDICINE

## 2020-07-28 PROCEDURE — 63600175 PHARM REV CODE 636 W HCPCS: Performed by: NURSE PRACTITIONER

## 2020-07-28 PROCEDURE — C9600 PERC DRUG-EL COR STENT SING: HCPCS | Mod: LC | Performed by: INTERNAL MEDICINE

## 2020-07-28 PROCEDURE — 99153 MOD SED SAME PHYS/QHP EA: CPT | Performed by: INTERNAL MEDICINE

## 2020-07-28 PROCEDURE — 36415 COLL VENOUS BLD VENIPUNCTURE: CPT

## 2020-07-28 PROCEDURE — C1887 CATHETER, GUIDING: HCPCS | Performed by: INTERNAL MEDICINE

## 2020-07-28 PROCEDURE — 25000003 PHARM REV CODE 250: Performed by: PHYSICIAN ASSISTANT

## 2020-07-28 PROCEDURE — 93459 L HRT ART/GRFT ANGIO: CPT | Mod: 26,51,59, | Performed by: INTERNAL MEDICINE

## 2020-07-28 PROCEDURE — C1874 STENT, COATED/COV W/DEL SYS: HCPCS | Performed by: INTERNAL MEDICINE

## 2020-07-28 PROCEDURE — 63600175 PHARM REV CODE 636 W HCPCS: Performed by: HOSPITALIST

## 2020-07-28 PROCEDURE — 92928 PR STENT: ICD-10-PCS | Mod: LC,,, | Performed by: INTERNAL MEDICINE

## 2020-07-28 PROCEDURE — 27201423 OPTIME MED/SURG SUP & DEVICES STERILE SUPPLY: Performed by: INTERNAL MEDICINE

## 2020-07-28 PROCEDURE — 93459: ICD-10-PCS | Mod: 26,51,59, | Performed by: INTERNAL MEDICINE

## 2020-07-28 PROCEDURE — 80202 ASSAY OF VANCOMYCIN: CPT

## 2020-07-28 PROCEDURE — 93459 L HRT ART/GRFT ANGIO: CPT | Mod: 59 | Performed by: INTERNAL MEDICINE

## 2020-07-28 PROCEDURE — C1753 CATH, INTRAVAS ULTRASOUND: HCPCS | Performed by: INTERNAL MEDICINE

## 2020-07-28 DEVICE — STENT RONYX35026UX RESOLUTE ONYX 3.50X26
Type: IMPLANTABLE DEVICE | Site: CORONARY | Status: FUNCTIONAL
Brand: RESOLUTE ONYX™

## 2020-07-28 RX ORDER — DIPHENHYDRAMINE HCL 50 MG
50 CAPSULE ORAL EVERY 6 HOURS
Status: DISCONTINUED | OUTPATIENT
Start: 2020-07-28 | End: 2020-07-28

## 2020-07-28 RX ORDER — HYDRALAZINE HYDROCHLORIDE 25 MG/1
25 TABLET, FILM COATED ORAL EVERY 8 HOURS
Status: DISCONTINUED | OUTPATIENT
Start: 2020-07-28 | End: 2020-07-30 | Stop reason: HOSPADM

## 2020-07-28 RX ORDER — FAMOTIDINE 20 MG/1
20 TABLET, FILM COATED ORAL 2 TIMES DAILY
Status: DISCONTINUED | OUTPATIENT
Start: 2020-07-28 | End: 2020-07-28

## 2020-07-28 RX ORDER — HYDRALAZINE HYDROCHLORIDE 20 MG/ML
10 INJECTION INTRAMUSCULAR; INTRAVENOUS ONCE
Status: DISCONTINUED | OUTPATIENT
Start: 2020-07-28 | End: 2020-07-30 | Stop reason: HOSPADM

## 2020-07-28 RX ORDER — SODIUM CHLORIDE 9 MG/ML
INJECTION, SOLUTION INTRAVENOUS CONTINUOUS
Status: DISCONTINUED | OUTPATIENT
Start: 2020-07-28 | End: 2020-07-30 | Stop reason: HOSPADM

## 2020-07-28 RX ORDER — FENTANYL CITRATE 50 UG/ML
INJECTION, SOLUTION INTRAMUSCULAR; INTRAVENOUS
Status: DISCONTINUED | OUTPATIENT
Start: 2020-07-28 | End: 2020-07-28 | Stop reason: HOSPADM

## 2020-07-28 RX ORDER — MIDAZOLAM HYDROCHLORIDE 1 MG/ML
INJECTION, SOLUTION INTRAMUSCULAR; INTRAVENOUS
Status: DISCONTINUED | OUTPATIENT
Start: 2020-07-28 | End: 2020-07-28 | Stop reason: HOSPADM

## 2020-07-28 RX ORDER — CLOPIDOGREL BISULFATE 75 MG/1
TABLET ORAL
Status: DISCONTINUED | OUTPATIENT
Start: 2020-07-28 | End: 2020-07-28 | Stop reason: HOSPADM

## 2020-07-28 RX ORDER — SODIUM,POTASSIUM PHOSPHATES 280-250MG
2 POWDER IN PACKET (EA) ORAL ONCE
Status: COMPLETED | OUTPATIENT
Start: 2020-07-28 | End: 2020-07-28

## 2020-07-28 RX ORDER — FLUCONAZOLE 150 MG/1
150 TABLET ORAL ONCE
Status: COMPLETED | OUTPATIENT
Start: 2020-07-29 | End: 2020-07-29

## 2020-07-28 RX ORDER — IODIXANOL 320 MG/ML
INJECTION, SOLUTION INTRAVASCULAR
Status: DISCONTINUED | OUTPATIENT
Start: 2020-07-28 | End: 2020-07-28 | Stop reason: HOSPADM

## 2020-07-28 RX ORDER — LIDOCAINE HYDROCHLORIDE 10 MG/ML
INJECTION, SOLUTION EPIDURAL; INFILTRATION; INTRACAUDAL; PERINEURAL
Status: DISCONTINUED | OUTPATIENT
Start: 2020-07-28 | End: 2020-07-28 | Stop reason: HOSPADM

## 2020-07-28 RX ORDER — DIPHENHYDRAMINE HYDROCHLORIDE 50 MG/ML
INJECTION INTRAMUSCULAR; INTRAVENOUS
Status: DISCONTINUED | OUTPATIENT
Start: 2020-07-28 | End: 2020-07-28 | Stop reason: HOSPADM

## 2020-07-28 RX ORDER — HEPARIN SODIUM 1000 [USP'U]/ML
INJECTION, SOLUTION INTRAVENOUS; SUBCUTANEOUS
Status: DISCONTINUED | OUTPATIENT
Start: 2020-07-28 | End: 2020-07-28 | Stop reason: HOSPADM

## 2020-07-28 RX ORDER — NAPROXEN SODIUM 220 MG/1
TABLET, FILM COATED ORAL
Status: DISCONTINUED | OUTPATIENT
Start: 2020-07-28 | End: 2020-07-28 | Stop reason: HOSPADM

## 2020-07-28 RX ORDER — SODIUM,POTASSIUM PHOSPHATES 280-250MG
2 POWDER IN PACKET (EA) ORAL ONCE
Status: DISCONTINUED | OUTPATIENT
Start: 2020-07-28 | End: 2020-07-30 | Stop reason: HOSPADM

## 2020-07-28 RX ORDER — MORPHINE SULFATE 10 MG/ML
INJECTION INTRAMUSCULAR; INTRAVENOUS; SUBCUTANEOUS
Status: DISCONTINUED | OUTPATIENT
Start: 2020-07-28 | End: 2020-07-30 | Stop reason: HOSPADM

## 2020-07-28 RX ORDER — OXYCODONE HYDROCHLORIDE 5 MG/1
5 TABLET ORAL EVERY 4 HOURS PRN
Status: DISCONTINUED | OUTPATIENT
Start: 2020-07-28 | End: 2020-07-30 | Stop reason: HOSPADM

## 2020-07-28 RX ORDER — ONDANSETRON 2 MG/ML
4 INJECTION INTRAMUSCULAR; INTRAVENOUS EVERY 12 HOURS PRN
Status: DISCONTINUED | OUTPATIENT
Start: 2020-07-28 | End: 2020-07-30 | Stop reason: HOSPADM

## 2020-07-28 RX ORDER — METHYLPREDNISOLONE SOD SUCC 125 MG
125 VIAL (EA) INJECTION ONCE
Status: COMPLETED | OUTPATIENT
Start: 2020-07-28 | End: 2020-07-28

## 2020-07-28 RX ORDER — MORPHINE SULFATE 10 MG/ML
3 INJECTION INTRAMUSCULAR; INTRAVENOUS; SUBCUTANEOUS
Status: DISCONTINUED | OUTPATIENT
Start: 2020-07-28 | End: 2020-07-30 | Stop reason: HOSPADM

## 2020-07-28 RX ORDER — ACETAMINOPHEN 325 MG/1
650 TABLET ORAL EVERY 4 HOURS PRN
Status: DISCONTINUED | OUTPATIENT
Start: 2020-07-28 | End: 2020-07-30 | Stop reason: HOSPADM

## 2020-07-28 RX ORDER — DIPHENHYDRAMINE HCL 50 MG
50 CAPSULE ORAL ONCE
Status: COMPLETED | OUTPATIENT
Start: 2020-07-28 | End: 2020-07-28

## 2020-07-28 RX ORDER — SODIUM CHLORIDE 9 MG/ML
INJECTION, SOLUTION INTRAVENOUS CONTINUOUS
Status: DISCONTINUED | OUTPATIENT
Start: 2020-07-28 | End: 2020-07-29

## 2020-07-28 RX ADMIN — SODIUM CHLORIDE: 0.9 INJECTION, SOLUTION INTRAVENOUS at 12:07

## 2020-07-28 RX ADMIN — DIPHENHYDRAMINE HYDROCHLORIDE 50 MG: 50 CAPSULE ORAL at 07:07

## 2020-07-28 RX ADMIN — HYDRALAZINE HYDROCHLORIDE 25 MG: 25 TABLET, FILM COATED ORAL at 10:07

## 2020-07-28 RX ADMIN — INSULIN ASPART 2 UNITS: 100 INJECTION, SOLUTION INTRAVENOUS; SUBCUTANEOUS at 05:07

## 2020-07-28 RX ADMIN — INSULIN ASPART 1 UNITS: 100 INJECTION, SOLUTION INTRAVENOUS; SUBCUTANEOUS at 08:07

## 2020-07-28 RX ADMIN — POTASSIUM & SODIUM PHOSPHATES POWDER PACK 280-160-250 MG 2 PACKET: 280-160-250 PACK at 09:07

## 2020-07-28 RX ADMIN — HEPARIN SODIUM 5000 UNITS: 5000 INJECTION INTRAVENOUS; SUBCUTANEOUS at 10:07

## 2020-07-28 RX ADMIN — METHYLPREDNISOLONE SODIUM SUCCINATE 125 MG: 125 INJECTION, POWDER, FOR SOLUTION INTRAMUSCULAR; INTRAVENOUS at 07:07

## 2020-07-28 RX ADMIN — ATORVASTATIN CALCIUM 80 MG: 40 TABLET, FILM COATED ORAL at 09:07

## 2020-07-28 RX ADMIN — ASPIRIN 81 MG 81 MG: 81 TABLET ORAL at 09:07

## 2020-07-28 RX ADMIN — GABAPENTIN 100 MG: 100 CAPSULE ORAL at 08:07

## 2020-07-28 RX ADMIN — MORPHINE SULFATE 2 MG: 10 INJECTION INTRAVENOUS at 01:07

## 2020-07-28 RX ADMIN — CEFEPIME HYDROCHLORIDE 2 G: 2 INJECTION, SOLUTION INTRAVENOUS at 04:07

## 2020-07-28 RX ADMIN — Medication 1250 MG: at 08:07

## 2020-07-28 RX ADMIN — HEPARIN SODIUM 5000 UNITS: 5000 INJECTION INTRAVENOUS; SUBCUTANEOUS at 07:07

## 2020-07-28 RX ADMIN — SODIUM CHLORIDE: 0.9 INJECTION, SOLUTION INTRAVENOUS at 06:07

## 2020-07-28 RX ADMIN — ALLOPURINOL 100 MG: 100 TABLET ORAL at 09:07

## 2020-07-28 RX ADMIN — CEFEPIME HYDROCHLORIDE 2 G: 2 INJECTION, SOLUTION INTRAVENOUS at 05:07

## 2020-07-28 RX ADMIN — INSULIN ASPART 8 UNITS: 100 INJECTION, SOLUTION INTRAVENOUS; SUBCUTANEOUS at 05:07

## 2020-07-28 NOTE — PLAN OF CARE
07/28/20 1551   Post-Acute Status   Post-Acute Authorization Home Health   Home Health Status Pending Payor Review   Medication Status Pending Service Contract   Patient choice form signed by patient/caregiver List with quality metrics by geographic area provided   Discharge Delays None known at this time   Discharge Plan   Discharge Plan A Home with family;Home Health     EDUARD contacted pt but she was out of room for procedure. EDUARD contacted pt's daughter to discuss d/c plans. Cornelia is aware pt will d/c home with iv abx and though they are okay with outpatient wound care she and the pt would prefer HH. EDUARD sent HH referral to several HH companies. EDUARD waiting confirmation services will be provided.     EDUARD contacted [facility] to confirm they will provide abx services. According to [facility], they are following and will provide services once pt is ready for d/c.

## 2020-07-28 NOTE — ASSESSMENT & PLAN NOTE
- MRI confirms osteo. Bone Bx- 7/18  - Afebrile and with resolved leukocytosis   - Wound cultures with Enterococcus, E cloacae, and GBS.   - ID consulted for ABx recommendations and will d/c axtreoman and start cefepime 2g q12h; continue vancomycin   - Vascular Surgery consulted and planning angiogram 7/24/2020.  - Patient declines amputation and wants to try conservative tx with  IV ABx's.   - CKD and Nephrology consulted.  Vascular discussed case with Nephrology and patient cleared from renal for vascular intervention.    - Podiatry planning debridement, if she remains 7/31 they will come assess for any procedure.  - Vascular surgery following with workup for revascularization procedure that she will need for healing. Vein mapping and Angiogram complete, uncertain timing for revascularization procedure  -stress test today to complete cardiac workup-     Multiple wall motion reversible defects.

## 2020-07-28 NOTE — PROGRESS NOTES
[facility] Hospital Medicine  Progress Note    Patient Name: Suyapa Connelly  MRN: [id number]  Patient Class: IP- Inpatient   Admission Date: 7/17/2020  Length of Stay: 11 days  Attending Physician: Hitesh Mason MD  Primary Care Provider: Erlinda Rahman NP        Subjective:     Principal Problem:Acute osteomyelitis of left calcaneus        HPI:  Suyapa Connelly is a 54 y.o. female that (in part)  has a past medical history of Anxiety, Depression, Diabetes mellitus, GERD (gastroesophageal reflux disease), Hyperlipemia, Hypertension, and Myocardial infarction. Patient has a past surgical history that includes Angiogram - Right Extremity (Right, 7/9/15); angiogram-left leg (10/6/15); Catheterization of both left and right heart (N/A, 12/18/2019); Coronary angiography (N/A, 12/18/2019); Coronary Artery Bypass Graft (CABG) (N/A, 1/14/2020); and Insertion of tunneled central venous hemodialysis catheter (N/A, 1/27/2020). Presents to [facility] Emergency Department complaining of the left worsening heel foot wound. Poor wound healing.  Patient became concerned due to malodorous drainage.  Pain with ambulation and weight-bearing.  Associated hyperglycemia.  Denies fever chills.    In the emergency department physical exam concerning for osteomyelitis secondary to diabetic foot ulcer in the setting of uncontrolled diabetes.  Routine laboratory studies revealed hyperglycemia.  X-ray of the foot performed.  Concerning for osteomyelitis.  MRI confirmed this.  Podiatry was consulted.  Broad-spectrum antibiotics were initiated in the ED a special consideration given to risk of Pseudomonas infection.    Hospital medicine has been asked to admit to inpatient for further evaluation and treatment.     Overview/Hospital Course:  Suyapa Connelly is a 54 y.o. female that (in part)  has a past medical history of Anxiety, Depression, Diabetes mellitus, GERD (gastroesophageal reflux  disease), Hyperlipemia, Hypertension, and Myocardial infarction.  has a past surgical history that includes Angiogram - Right Extremity (Right, 7/9/15); angiogram-left leg (10/6/15); Catheterization of both left and right heart (N/A, 12/18/2019); Coronary angiography (N/A, 12/18/2019); Coronary Artery Bypass Graft (CABG) (N/A, 1/14/2020); and Insertion of tunneled central venous hemodialysis catheter (N/A, 1/27/2020). Presents to [facility] Emergency Department complaining of the left heel foot wound.  MRI confirmed osteo. Podiatry, ID, and vascular were consulted.  Podiatry discussed options with patient. She has chosen 6 weeks of IV abx. ID has been consulted to tailor abx. She has been continued on Aztreonam.  Blood cultures were NG. Bone bx on 7/18 has grown Enterococcus faecalis, Grp B Strep, and Enterobacter cloacae. Vascular Surgery consulted and did an angiogram which revealed adequate artery for access the left SFA and AK popliteal artery occlusion.  Patient with renal failure and Nephrology consulted.  Renal function continues to improve and ok to proceed with vascular intervention.  Podiatry planning debridement post vascular procedure.  Creatinine peaked at 2.4 and is currently 1.2. patient had a stress test on 7/27 that showed reversible defects in anterior, inferior and lateral walls. Podiatry signed off. ID consulted to make final ID recs for home.      Interval History: no new issues.     Review of Systems   Constitutional: Negative for activity change.   HENT: Negative for congestion.    Respiratory: Negative for chest tightness and shortness of breath.    Cardiovascular: Negative for chest pain.   Gastrointestinal: Negative for abdominal pain.   Genitourinary: Negative for difficulty urinating.   Musculoskeletal: Negative for arthralgias.   Psychiatric/Behavioral: Negative for agitation and behavioral problems.     Objective:     Vital Signs (Most Recent):  Temp: 97.9 °F  (36.6 °C) (07/28/20 0749)  Pulse: 89 (07/28/20 0749)  Resp: 19 (07/28/20 0749)  BP: (!) 166/85 (07/28/20 0749)  SpO2: 97 % (07/28/20 0749) Vital Signs (24h Range):  Temp:  [97.9 °F (36.6 °C)-98.6 °F (37 °C)] 97.9 °F (36.6 °C)  Pulse:  [84-90] 89  Resp:  [17-20] 19  SpO2:  [95 %-99 %] 97 %  BP: (131-166)/(61-85) 166/85     Weight: 90 kg (198 lb 6.6 oz)  Body mass index is 33.02 kg/m².  No intake or output data in the 24 hours ending 07/28/20 1047   Physical Exam  Vitals signs and nursing note reviewed.   Constitutional:       General: She is not in acute distress.     Appearance: Normal appearance. She is obese. She is not ill-appearing, toxic-appearing or diaphoretic.   HENT:      Head: Normocephalic and atraumatic.   Pulmonary:      Effort: No respiratory distress.   Neurological:      Mental Status: She is alert and oriented to person, place, and time.   Psychiatric:         Mood and Affect: Mood normal.         Behavior: Behavior normal.         Significant Labs:   BMP:   Recent Labs   Lab 07/28/20  0424         K 4.2      CO2 26   BUN 17   CREATININE 1.3   CALCIUM 8.5*   MG 1.8     CBC:   Recent Labs   Lab 07/26/20  2334   WBC 9.49   HGB 9.4*   HCT 29.5*   *       Significant Imaging:       Assessment/Plan:      * Acute osteomyelitis of left calcaneus  - MRI confirms osteo. Bone Bx- 7/18  - Afebrile and with resolved leukocytosis   - Wound cultures with Enterococcus, E cloacae, and GBS.   - ID consulted for ABx recommendations and will d/c axtreoman and start cefepime 2g q12h; continue vancomycin   - Vascular Surgery consulted and planning angiogram 7/24/2020.  - Patient declines amputation and wants to try conservative tx with  IV ABx's.   - CKD and Nephrology consulted.  Vascular discussed case with Nephrology and patient cleared from renal for vascular intervention.    - Podiatry planning debridement, if she remains 7/31 they will come assess for any procedure.  - Vascular surgery  following with workup for revascularization procedure that she will need for healing. Vein mapping and Angiogram complete, uncertain timing for revascularization procedure  -stress test today to complete cardiac workup-     Multiple wall motion reversible defects.           Preop cardiovascular exam        Hyponatremia  - resolved  - Monitor with daily labs      Stage 3 chronic kidney disease  Appreciate Nephrology input.  Patient did have acute on chronic CKD 3 which has now resolved  Hyperkalemia on 7/21.  Treated with Calcium, Albuterol and Kayexalate.  Resolved         Non healing left heel wound  Continue current wound care  Provide adequate analgesia prior to daily wound care      S/P CABG x 1  No acute issues     -stress test today for cardiac clearance    Mixed hyperlipidemia  Stable      Chronic combined systolic and diastolic heart failure  No acute issues   EF 50% on recent echo         Essential hypertension  Continue current management.  Adequate control      Type 2 diabetes mellitus without complication, with long-term current use of insulin  Uncontrolled with hyperglycemia. Manifestations likely of CKD 3      - Will check an A1c- > 14.  - Glucose increased secondary to steroids.  - Added insulin with meals and will increase prandial insulin to 8U and increased basal to 15U for better control of blood glucose   -improved control           Peripheral artery disease  Patient requires revascularization of the left lower extremity. Angiogram completed 7/24 with bilateral occlusive disease. Vascular surgery following and will need revascularization        VTE Risk Mitigation (From admission, onward)         Ordered     heparin (porcine) injection 5,000 Units  Every 8 hours      07/17/20 2217     IP VTE HIGH RISK PATIENT  Once      07/17/20 2217     Place sequential compression device  Until discontinued      07/17/20 2217     Place HAILEY hose  Until discontinued      07/17/20 2217                Podiatry  signed off. Cards eval for + stress test. ID Abx recs for home likely on 7/29. Home soon.       Hitesh Barrow MD  Department of Hospital Medicine   [facility]

## 2020-07-28 NOTE — PLAN OF CARE
Problem: Diabetes Comorbidity  Goal: Blood Glucose Level Within Desired Range  Outcome: Ongoing, Progressing     Problem: Electrolyte Imbalance (Acute Kidney Injury/Impairment)  Goal: Serum Electrolyte Balance  Outcome: Ongoing, Progressing     Problem: Infection  Goal: Infection Symptom Resolution  Outcome: Ongoing, Progressing

## 2020-07-28 NOTE — PLAN OF CARE
Resting comfortably-prepared for cardiac cath this am. Stable hours.  Problem: Wound  Goal: Optimal Wound Healing  Outcome: Ongoing, Progressing  Intervention: Promote Effective Wound Healing  Flowsheets (Taken 7/28/2020 0300)  Oral Nutrition Promotion:   calorie dense foods provided   calorie dense liquids provided   rest periods promoted  Sleep/Rest Enhancement:   awakenings minimized   consistent schedule promoted   noise level reduced   regular sleep/rest pattern promoted   therapeutic touch utilized  Pain Management Interventions:   care clustered   diversional activity provided   pain management plan reviewed with patient/caregiver   pillow support provided   quiet environment facilitated     Problem: Fall Injury Risk  Goal: Absence of Fall and Fall-Related Injury  Outcome: Ongoing, Progressing  Intervention: Identify and Manage Contributors to Fall Injury Risk  Flowsheets (Taken 7/28/2020 0300)  Self-Care Promotion:   independence encouraged   BADL personal routines maintained   BADL personal objects within reach  Medication Review/Management: medications reviewed  Intervention: Promote Injury-Free Environment  Flowsheets (Taken 7/28/2020 0300)  Safety Promotion/Fall Prevention:   assistive device/personal item within reach   bed alarm set   commode/urinal/bedpan at bedside   diversional activities provided   Fall Risk reviewed with patient/family   Fall Risk signage in place   lighting adjusted   side rails raised x 2   supervised activity   instructed to call staff for mobility  Environmental Safety Modification:   assistive device/personal items within reach   clutter free environment maintained   lighting adjusted   room organization consistent     Problem: Diabetes Comorbidity  Goal: Blood Glucose Level Within Desired Range  Outcome: Ongoing, Progressing  Intervention: Maintain Glycemic Control  Flowsheets (Taken 7/28/2020 0300)  Glycemic Management:   blood glucose monitoring   insulin dose matched to  carbohydrate intake     Problem: Renal Function Impairment (Acute Kidney Injury/Impairment)  Goal: Effective Renal Function  Outcome: Ongoing, Progressing     Problem: Skin Injury Risk Increased  Goal: Skin Health and Integrity  Outcome: Ongoing, Progressing  Intervention: Optimize Skin Protection  Flowsheets (Taken 7/28/2020 0300)  Pressure Reduction Techniques:   frequent weight shift encouraged   weight shift assistance provided  Pressure Reduction Devices:   heel offloading device utilized   positioning supports utilized   pressure-redistributing mattress utilized  Skin Protection:   adhesive use limited   protective footwear used  Head of Bed (HOB): HOB at 20-30 degrees  Intervention: Promote and Optimize Oral Intake  Flowsheets (Taken 7/28/2020 0300)  Oral Nutrition Promotion:   calorie dense foods provided   calorie dense liquids provided   rest periods promoted

## 2020-07-28 NOTE — PLAN OF CARE
Patient off the floor for heart cath.  Will f/u with her tomorrow. She has osteomyelitis of L foot with bone cultures growing e.faecalis, group B sgtrep and enterobacter. She has a labeled penicillin allergy, that I question is real. Will discuss allergy with her tomorrow and could potentially use zosyn monotherapy outpatient. Otherwise, would continue vanc/cefepime x 6 weeks for osteo. Can not use quinolones with amiodarone. Needs picc line and home health arranged. Ultimately needs adequate blood flow to heal wound and sounds like there are outpatient plans for revascularization.

## 2020-07-28 NOTE — NURSING
Patient arrived to in stable condition from cath lab.  Visualized site and assessed patient, noted site clean, dry and intact with palpable pulses.  Noted no redness, swelling or hematoma.  Will continue to monitor.

## 2020-07-28 NOTE — NURSING
Patient refused all morning medications stated she couldn't take them without food.  All medications will be return to [facility].

## 2020-07-28 NOTE — BRIEF OP NOTE
Successful IVUS guided PCI of mid circumflex 80-85% stenosis with drug-eluting stent x1.  A 3.5 into 26 mm drug-eluting stent was implanted with excellent angiographic results and with IVUS.  SUSIE 3 flow post PCI    Coronary angiogram:    Left main:  No significant stenosis    Lad:  Severe proximal LAD stenosis.  Distal LAD gets supply from LIMA to LAD.  Lima to LAD is widely patent.  No significant stenosis noted after touchdown of LIMA to LAD.    Circumflex:  Mid circumflex 80-85% stenosis    RCA:   in the mid RCA.  Distal RCA gets supply from well-developed collaterals from the left      Access:  6 [place] right common femoral artery access.  Sheath sutured in.  Will be pulled when ACT less than 160    Assessment and plan     aspirin 81 mg daily indefinitely    Plavix 75 mg daily uninterrupted for at least 1 year.    Talked to vascular surgery prior to procedure.  Okay to give dual antiplatelet therapy as Dr. Rivera will operate on aspirin and Plavix.    Okay to proceed with vascular surgery at low-to-moderate risk of coronary ischemia.  Continue pre, rico and postoperative beta-blockers to keep heart rate around 60 beats per minute.    STATINS    Follow-up in Cardiology Clinic    I certify that I was present for catheter insertion, catheter manipulation, angiography, and angiographic interpretation of this patient.    Procedure Log documented by Documenter: Katty Almanzar RN and verified by Rachel Easley MD.    Date: 7/28/2020  Time: 12:08 PM

## 2020-07-28 NOTE — SUBJECTIVE & OBJECTIVE
Interval History: no new issues.     Review of Systems   Constitutional: Negative for activity change.   HENT: Negative for congestion.    Respiratory: Negative for chest tightness and shortness of breath.    Cardiovascular: Negative for chest pain.   Gastrointestinal: Negative for abdominal pain.   Genitourinary: Negative for difficulty urinating.   Musculoskeletal: Negative for arthralgias.   Psychiatric/Behavioral: Negative for agitation and behavioral problems.     Objective:     Vital Signs (Most Recent):  Temp: 97.9 °F (36.6 °C) (07/28/20 0749)  Pulse: 89 (07/28/20 0749)  Resp: 19 (07/28/20 0749)  BP: (!) 166/85 (07/28/20 0749)  SpO2: 97 % (07/28/20 0749) Vital Signs (24h Range):  Temp:  [97.9 °F (36.6 °C)-98.6 °F (37 °C)] 97.9 °F (36.6 °C)  Pulse:  [84-90] 89  Resp:  [17-20] 19  SpO2:  [95 %-99 %] 97 %  BP: (131-166)/(61-85) 166/85     Weight: 90 kg (198 lb 6.6 oz)  Body mass index is 33.02 kg/m².  No intake or output data in the 24 hours ending 07/28/20 1047   Physical Exam  Vitals signs and nursing note reviewed.   Constitutional:       General: She is not in acute distress.     Appearance: Normal appearance. She is obese. She is not ill-appearing, toxic-appearing or diaphoretic.   HENT:      Head: Normocephalic and atraumatic.   Pulmonary:      Effort: No respiratory distress.   Neurological:      Mental Status: She is alert and oriented to person, place, and time.   Psychiatric:         Mood and Affect: Mood normal.         Behavior: Behavior normal.         Significant Labs:   BMP:   Recent Labs   Lab 07/28/20  0424         K 4.2      CO2 26   BUN 17   CREATININE 1.3   CALCIUM 8.5*   MG 1.8     CBC:   Recent Labs   Lab 07/26/20  2334   WBC 9.49   HGB 9.4*   HCT 29.5*   *       Significant Imaging:

## 2020-07-28 NOTE — PROGRESS NOTES
[facility]  Nephrology  Progress Note    Patient Name: Suyapa Connelly  MRN: [id number]  Admission Date: 7/17/2020  Hospital Length of Stay: 11 days  Attending Provider: Hitesh Mason MD   Primary Care Physician: Erlinda Rahman NP  Principal Problem:Acute osteomyelitis of left calcaneus  Date of service 7/28/2020  Consults Reason for consult: ROSLYN on CKD  Subjective:     Interval History: Unable to assess pt yesterday, off the floor for stress testing; held bicitra. No acute overnight events. Abnormal stress yesterday, cardiology planning for C/ probable stenting today. Renal function stable at baseline. IVF restarted NS 75 ml/hr. Pt doing well, only complaint is vaginal itching which she believes is due to a yeast infection from the abx.    Review of patient's allergies indicates:   Allergen Reactions    Ciprofloxacin      Itchiness    Contrast media      Kidney injury    Pcn [penicillins]      Rash; tolerated ceftriaxone on 1/13/20     Current Facility-Administered Medications   Medication Frequency    0.9%  NaCl infusion Continuous    acetaminophen tablet 650 mg Q8H PRN    acetaminophen tablet 650 mg Q8H PRN    allopurinoL tablet 100 mg Daily    ALPRAZolam tablet 0.5 mg BID PRN    amiodarone tablet 400 mg Daily    aspirin chewable tablet 81 mg Daily    atorvastatin tablet 80 mg Daily    cadexomer iodine 0.9 % gel Daily    carvediloL tablet 6.25 mg Daily    cefepime in dextrose 5 % IVPB 2 g Q12H    clopidogreL tablet 75 mg Daily    dextrose 50% injection 12.5 g PRN    ergocalciferol capsule 50,000 Units Q7 Days    gabapentin capsule 100 mg TID    glucagon (human recombinant) injection 1 mg PRN    heparin (porcine) injection 5,000 Units Q8H    insulin aspart U-100 pen 0-5 Units Q6H PRN    insulin aspart U-100 pen 8 Units TID WM    insulin detemir U-100 pen 15 Units Daily    melatonin tablet 6 mg Nightly PRN    morphine injection 2 mg Q4H PRN    ondansetron  injection 8 mg Q8H PRN    pantoprazole EC tablet 40 mg Daily    polyethylene glycol packet 17 g BID PRN    sodium chloride 0.9% flush 10 mL PRN    vancomycin - pharmacy to dose pharmacy to manage frequency    vancomycin 1.25 g in dextrose 5% 250 mL IVPB (ready to mix) Q24H       Objective:     Vital Signs (Most Recent):  Temp: 97.9 °F (36.6 °C) (07/28/20 0749)  Pulse: 89 (07/28/20 0749)  Resp: 19 (07/28/20 0749)  BP: (!) 166/85 (07/28/20 0749)  SpO2: 97 % (07/28/20 0749)  O2 Device (Oxygen Therapy): room air (07/27/20 2004) Vital Signs (24h Range):  Temp:  [97.9 °F (36.6 °C)-98.6 °F (37 °C)] 97.9 °F (36.6 °C)  Pulse:  [84-90] 89  Resp:  [17-20] 19  SpO2:  [95 %-99 %] 97 %  BP: (131-166)/(61-85) 166/85     Weight: 90 kg (198 lb 6.6 oz) (07/28/20 0600)  Body mass index is 33.02 kg/m².  Body surface area is 2.03 meters squared.    I/O last 3 completed shifts:  In: -   Out: 1000 [Urine:1000]    Physical Exam  Vitals signs and nursing note reviewed.   Constitutional:       General: She is not in acute distress.     Appearance: Normal appearance. She is well-developed. She is not ill-appearing or diaphoretic.   HENT:      Head: Normocephalic and atraumatic.   Eyes:      General: No scleral icterus.        Right eye: No discharge.         Left eye: No discharge.   Cardiovascular:      Rate and Rhythm: Normal rate and regular rhythm.      Heart sounds: Normal heart sounds. No murmur. No friction rub.   Pulmonary:      Effort: Pulmonary effort is normal. No respiratory distress.      Breath sounds: Normal breath sounds. No wheezing or rales.   Abdominal:      General: There is no distension.      Palpations: Abdomen is soft. There is no mass.      Tenderness: There is no abdominal tenderness.   Musculoskeletal:         General: Swelling present. No deformity.      Right lower leg: No edema (mild pitting BLE).      Left lower leg: No edema.   Skin:     General: Skin is warm and dry.      Findings: No rash.      Comments:  L foot wound with bandages   Neurological:      Mental Status: She is alert and oriented to person, place, and time.   Psychiatric:         Mood and Affect: Mood normal.         Behavior: Behavior normal.         Thought Content: Thought content normal.         Significant Labs:   CBC:   Recent Labs   Lab 07/26/20  2334   WBC 9.49   RBC 3.01*   HGB 9.4*   HCT 29.5*   *   MCV 98   MCH 31.2*   MCHC 31.9*     CMP:   Recent Labs   Lab 07/22/20  0453  07/28/20  0424   *   < > 104   CALCIUM 8.1*   < > 8.5*   ALBUMIN 2.1*  --   --    *   < > 136   K 4.7   < > 4.2   CO2 19*   < > 26      < > 105   BUN 50*   < > 17   CREATININE 2.0*   < > 1.3    < > = values in this interval not displayed.     Microbiology Results (last 7 days)     Procedure Component Value Units Date/Time    Blood Culture #2 **CANNOT BE ORDERED STAT** [[id number]] Collected: 07/17/20 1712    Order Status: Completed Specimen: Blood from Peripheral, Hand, Left Updated: 07/22/20 1903     Blood Culture, Routine No growth after 5 days.    Blood Culture #1 **CANNOT BE ORDERED STAT** [[id number]] Collected: 07/17/20 1631    Order Status: Completed Specimen: Blood from Peripheral, Antecubital, Right Updated: 07/22/20 1903     Blood Culture, Routine No growth after 5 days.        Recent Labs   Lab 07/22/20  0037   COLORU Straw   SPECGRAV 1.015   PHUR 5.0   PROTEINUA 2+*   BACTERIA None   NITRITE Negative   LEUKOCYTESUR Negative   UROBILINOGEN Negative   HYALINECASTS 0     All labs within the past 24 hours have been reviewed.    Significant Imaging:  X-Ray: Reviewed    Assessment/Plan:     ROSLYN on CKD 3   - Has hx of HD dependant ROSLYN (LEVI) requiring RRT from 1/16/20 -3/10/20  - Recent baseline Crappears to be 1.4 -1.9 (GFR 30-43%); suspect underlying diabetic nephropathy Hgb A 1c > 14  - Cr stable, continue to hold lasix, weights are relatively stable but up overall, continue to hold IVF, consider restarting PO lasix as needed  - caution with  IVF in advanced CKD patients - monitor O2 closely  - Repeat UA today  - renally dose meds for current GFR/ avoid nephrotoxic meds  - strict Is/ Os/ daily weights  - daily labs while admitted     Hypervolemia   - continue to hold IVF, monitor I&Os, daily weights, consider restarting lasix PO as needed, currently volume status is stable and appears euvolemic     Metabolic Acidosis   - Bicarb trending up  - d/t ROSLYN + CKD  - continue to hold bicitra  - AM labs     CKD MBD  - PTH 78 and Vit D 7, phos trending down.   - PhosNak 2 packets x1 today.   - continue ergo   - labs in AM     Hypomagnesemia  - Mg trending down   - Replace today  - Monitor      Proteinuria   - UPC 2.5 g   - would benefit fron ACE/ ARB once ROSLYN resolves & procedures are complete   - repeat UA today     Hyperuricemia   - continue allopurinol 100 mg PO QD    Acute osteomyelitis of L foot, non-healing wound  CAD s/p CABG  DM  PAD    Thank you for allowing me to participate in the care of your patient.  Please call with any questions.    Date of service: 7/28/2020  Rose Coker PA-C  Nephrology  [facility]  Office [phone]   [facility]

## 2020-07-28 NOTE — ASSESSMENT & PLAN NOTE
Patient requires revascularization of the left lower extremity. Angiogram completed 7/24 with bilateral occlusive disease. Vascular surgery following and will need revascularization

## 2020-07-28 NOTE — PLAN OF CARE

## 2020-07-28 NOTE — NURSING
Received bedside handoff from Yael. Discussed plan of care, pain management and safety measures with patient. Patient resting comfortably at present time.

## 2020-07-29 LAB
ALBUMIN SERPL BCP-MCNC: 2.3 G/DL (ref 3.5–5.2)
ANION GAP SERPL CALC-SCNC: 6 MMOL/L (ref 8–16)
ANION GAP SERPL CALC-SCNC: 6 MMOL/L (ref 8–16)
BACTERIA #/AREA URNS HPF: NORMAL /HPF
BASOPHILS # BLD AUTO: 0.02 K/UL (ref 0–0.2)
BASOPHILS NFR BLD: 0.2 % (ref 0–1.9)
BILIRUB UR QL STRIP: NEGATIVE
BUN SERPL-MCNC: 28 MG/DL (ref 6–20)
BUN SERPL-MCNC: 31 MG/DL (ref 6–20)
CALCIUM SERPL-MCNC: 8.3 MG/DL (ref 8.7–10.5)
CALCIUM SERPL-MCNC: 8.6 MG/DL (ref 8.7–10.5)
CHLORIDE SERPL-SCNC: 102 MMOL/L (ref 95–110)
CHLORIDE SERPL-SCNC: 103 MMOL/L (ref 95–110)
CLARITY UR: CLEAR
CO2 SERPL-SCNC: 22 MMOL/L (ref 23–29)
CO2 SERPL-SCNC: 24 MMOL/L (ref 23–29)
COLOR UR: YELLOW
CREAT SERPL-MCNC: 1.7 MG/DL (ref 0.5–1.4)
CREAT SERPL-MCNC: 1.8 MG/DL (ref 0.5–1.4)
DIFFERENTIAL METHOD: ABNORMAL
EOSINOPHIL # BLD AUTO: 0 K/UL (ref 0–0.5)
EOSINOPHIL NFR BLD: 0 % (ref 0–8)
ERYTHROCYTE [DISTWIDTH] IN BLOOD BY AUTOMATED COUNT: 14.4 % (ref 11.5–14.5)
EST. GFR  (AFRICAN AMERICAN): 37 ML/MIN/1.73 M^2
EST. GFR  (AFRICAN AMERICAN): 39 ML/MIN/1.73 M^2
EST. GFR  (NON AFRICAN AMERICAN): 32 ML/MIN/1.73 M^2
EST. GFR  (NON AFRICAN AMERICAN): 34 ML/MIN/1.73 M^2
GLUCOSE SERPL-MCNC: 241 MG/DL (ref 70–110)
GLUCOSE SERPL-MCNC: 354 MG/DL (ref 70–110)
GLUCOSE UR QL STRIP: ABNORMAL
HCT VFR BLD AUTO: 31.7 % (ref 37–48.5)
HGB BLD-MCNC: 10 G/DL (ref 12–16)
HGB UR QL STRIP: ABNORMAL
HYALINE CASTS #/AREA URNS LPF: 0 /LPF
IMM GRANULOCYTES # BLD AUTO: 0.12 K/UL (ref 0–0.04)
IMM GRANULOCYTES NFR BLD AUTO: 0.9 % (ref 0–0.5)
KETONES UR QL STRIP: ABNORMAL
LEUKOCYTE ESTERASE UR QL STRIP: ABNORMAL
LYMPHOCYTES # BLD AUTO: 1 K/UL (ref 1–4.8)
LYMPHOCYTES NFR BLD: 8 % (ref 18–48)
MCH RBC QN AUTO: 30.3 PG (ref 27–31)
MCHC RBC AUTO-ENTMCNC: 31.5 G/DL (ref 32–36)
MCV RBC AUTO: 96 FL (ref 82–98)
MICROSCOPIC COMMENT: NORMAL
MONOCYTES # BLD AUTO: 0.8 K/UL (ref 0.3–1)
MONOCYTES NFR BLD: 6.1 % (ref 4–15)
NEUTROPHILS # BLD AUTO: 11.1 K/UL (ref 1.8–7.7)
NEUTROPHILS NFR BLD: 84.8 % (ref 38–73)
NITRITE UR QL STRIP: NEGATIVE
NRBC BLD-RTO: 0 /100 WBC
PH UR STRIP: 5 [PH] (ref 5–8)
PHOSPHATE SERPL-MCNC: 2.3 MG/DL (ref 2.7–4.5)
PHOSPHATE SERPL-MCNC: 2.7 MG/DL (ref 2.7–4.5)
PLATELET # BLD AUTO: 378 K/UL (ref 150–350)
PMV BLD AUTO: 10.1 FL (ref 9.2–12.9)
POCT GLUCOSE: 123 MG/DL (ref 70–110)
POCT GLUCOSE: 165 MG/DL (ref 70–110)
POCT GLUCOSE: 225 MG/DL (ref 70–110)
POCT GLUCOSE: 269 MG/DL (ref 70–110)
POTASSIUM SERPL-SCNC: 4.3 MMOL/L (ref 3.5–5.1)
POTASSIUM SERPL-SCNC: 4.3 MMOL/L (ref 3.5–5.1)
PROT UR QL STRIP: ABNORMAL
RBC # BLD AUTO: 3.3 M/UL (ref 4–5.4)
RBC #/AREA URNS HPF: 0 /HPF (ref 0–4)
SODIUM SERPL-SCNC: 130 MMOL/L (ref 136–145)
SODIUM SERPL-SCNC: 133 MMOL/L (ref 136–145)
SP GR UR STRIP: >1.03 (ref 1–1.03)
URN SPEC COLLECT METH UR: ABNORMAL
UROBILINOGEN UR STRIP-ACNC: NEGATIVE EU/DL
WBC # BLD AUTO: 13.03 K/UL (ref 3.9–12.7)
WBC #/AREA URNS HPF: 1 /HPF (ref 0–5)
YEAST URNS QL MICRO: NORMAL

## 2020-07-29 PROCEDURE — 80048 BASIC METABOLIC PNL TOTAL CA: CPT

## 2020-07-29 PROCEDURE — 25000003 PHARM REV CODE 250: Performed by: INTERNAL MEDICINE

## 2020-07-29 PROCEDURE — 63600175 PHARM REV CODE 636 W HCPCS: Performed by: HOSPITALIST

## 2020-07-29 PROCEDURE — 25000003 PHARM REV CODE 250: Performed by: PHYSICIAN ASSISTANT

## 2020-07-29 PROCEDURE — 80069 RENAL FUNCTION PANEL: CPT

## 2020-07-29 PROCEDURE — 63600175 PHARM REV CODE 636 W HCPCS: Performed by: INTERNAL MEDICINE

## 2020-07-29 PROCEDURE — 99233 PR SUBSEQUENT HOSPITAL CARE,LEVL III: ICD-10-PCS | Mod: ,,, | Performed by: INTERNAL MEDICINE

## 2020-07-29 PROCEDURE — 36415 COLL VENOUS BLD VENIPUNCTURE: CPT

## 2020-07-29 PROCEDURE — 85025 COMPLETE CBC W/AUTO DIFF WBC: CPT

## 2020-07-29 PROCEDURE — 94761 N-INVAS EAR/PLS OXIMETRY MLT: CPT

## 2020-07-29 PROCEDURE — 21400001 HC TELEMETRY ROOM

## 2020-07-29 PROCEDURE — 81000 URINALYSIS NONAUTO W/SCOPE: CPT

## 2020-07-29 PROCEDURE — 99233 SBSQ HOSP IP/OBS HIGH 50: CPT | Mod: ,,, | Performed by: INTERNAL MEDICINE

## 2020-07-29 PROCEDURE — 25000003 PHARM REV CODE 250: Performed by: HOSPITALIST

## 2020-07-29 PROCEDURE — 25000003 PHARM REV CODE 250: Performed by: NURSE PRACTITIONER

## 2020-07-29 PROCEDURE — C9399 UNCLASSIFIED DRUGS OR BIOLOG: HCPCS | Performed by: HOSPITALIST

## 2020-07-29 PROCEDURE — 84100 ASSAY OF PHOSPHORUS: CPT

## 2020-07-29 PROCEDURE — 63600175 PHARM REV CODE 636 W HCPCS: Performed by: NURSE PRACTITIONER

## 2020-07-29 RX ORDER — INSULIN ASPART 100 [IU]/ML
10 INJECTION, SOLUTION INTRAVENOUS; SUBCUTANEOUS
Status: DISCONTINUED | OUTPATIENT
Start: 2020-07-29 | End: 2020-07-30 | Stop reason: HOSPADM

## 2020-07-29 RX ORDER — SODIUM,POTASSIUM PHOSPHATES 280-250MG
2 POWDER IN PACKET (EA) ORAL
Status: COMPLETED | OUTPATIENT
Start: 2020-07-29 | End: 2020-07-29

## 2020-07-29 RX ORDER — SODIUM,POTASSIUM PHOSPHATES 280-250MG
2 POWDER IN PACKET (EA) ORAL
Status: COMPLETED | OUTPATIENT
Start: 2020-07-30 | End: 2020-07-30

## 2020-07-29 RX ADMIN — INSULIN ASPART 3 UNITS: 100 INJECTION, SOLUTION INTRAVENOUS; SUBCUTANEOUS at 07:07

## 2020-07-29 RX ADMIN — FLUCONAZOLE 150 MG: 150 TABLET ORAL at 09:07

## 2020-07-29 RX ADMIN — MORPHINE SULFATE 3 MG: 10 INJECTION INTRAVENOUS at 10:07

## 2020-07-29 RX ADMIN — HEPARIN SODIUM 5000 UNITS: 5000 INJECTION INTRAVENOUS; SUBCUTANEOUS at 02:07

## 2020-07-29 RX ADMIN — PIPERACILLIN SODIUM, TAZOBACTAM SODIUM 4.5 G: 4; .5 INJECTION, POWDER, LYOPHILIZED, FOR SOLUTION INTRAVENOUS at 10:07

## 2020-07-29 RX ADMIN — INSULIN ASPART 8 UNITS: 100 INJECTION, SOLUTION INTRAVENOUS; SUBCUTANEOUS at 07:07

## 2020-07-29 RX ADMIN — CARVEDILOL 6.25 MG: 6.25 TABLET, FILM COATED ORAL at 09:07

## 2020-07-29 RX ADMIN — HYDRALAZINE HYDROCHLORIDE 25 MG: 25 TABLET, FILM COATED ORAL at 09:07

## 2020-07-29 RX ADMIN — INSULIN DETEMIR 15 UNITS: 100 INJECTION, SOLUTION SUBCUTANEOUS at 09:07

## 2020-07-29 RX ADMIN — PIPERACILLIN SODIUM, TAZOBACTAM SODIUM 4.5 G: 4; .5 INJECTION, POWDER, LYOPHILIZED, FOR SOLUTION INTRAVENOUS at 02:07

## 2020-07-29 RX ADMIN — PANTOPRAZOLE SODIUM 40 MG: 40 TABLET, DELAYED RELEASE ORAL at 09:07

## 2020-07-29 RX ADMIN — CLOPIDOGREL BISULFATE 75 MG: 75 TABLET, FILM COATED ORAL at 09:07

## 2020-07-29 RX ADMIN — GABAPENTIN 100 MG: 100 CAPSULE ORAL at 08:07

## 2020-07-29 RX ADMIN — GABAPENTIN 100 MG: 100 CAPSULE ORAL at 02:07

## 2020-07-29 RX ADMIN — HYDRALAZINE HYDROCHLORIDE 25 MG: 25 TABLET, FILM COATED ORAL at 02:07

## 2020-07-29 RX ADMIN — HEPARIN SODIUM 5000 UNITS: 5000 INJECTION INTRAVENOUS; SUBCUTANEOUS at 09:07

## 2020-07-29 RX ADMIN — ASPIRIN 81 MG 81 MG: 81 TABLET ORAL at 09:07

## 2020-07-29 RX ADMIN — HEPARIN SODIUM 5000 UNITS: 5000 INJECTION INTRAVENOUS; SUBCUTANEOUS at 05:07

## 2020-07-29 RX ADMIN — INSULIN ASPART 2 UNITS: 100 INJECTION, SOLUTION INTRAVENOUS; SUBCUTANEOUS at 05:07

## 2020-07-29 RX ADMIN — POTASSIUM & SODIUM PHOSPHATES POWDER PACK 280-160-250 MG 2 PACKET: 280-160-250 PACK at 08:07

## 2020-07-29 RX ADMIN — INSULIN ASPART 10 UNITS: 100 INJECTION, SOLUTION INTRAVENOUS; SUBCUTANEOUS at 02:07

## 2020-07-29 RX ADMIN — CEFEPIME HYDROCHLORIDE 2 G: 2 INJECTION, SOLUTION INTRAVENOUS at 05:07

## 2020-07-29 RX ADMIN — POTASSIUM & SODIUM PHOSPHATES POWDER PACK 280-160-250 MG 2 PACKET: 280-160-250 PACK at 09:07

## 2020-07-29 RX ADMIN — ATORVASTATIN CALCIUM 80 MG: 40 TABLET, FILM COATED ORAL at 09:07

## 2020-07-29 RX ADMIN — GABAPENTIN 100 MG: 100 CAPSULE ORAL at 09:07

## 2020-07-29 RX ADMIN — MORPHINE SULFATE 2 MG: 10 INJECTION INTRAVENOUS at 07:07

## 2020-07-29 RX ADMIN — ALLOPURINOL 100 MG: 100 TABLET ORAL at 09:07

## 2020-07-29 RX ADMIN — INSULIN ASPART 10 UNITS: 100 INJECTION, SOLUTION INTRAVENOUS; SUBCUTANEOUS at 05:07

## 2020-07-29 RX ADMIN — AMIODARONE HYDROCHLORIDE 400 MG: 200 TABLET ORAL at 09:07

## 2020-07-29 NOTE — PLAN OF CARE
Right groin puncture site free of bleeding and or hematoma. Right pedal pule weak but palpable. Patient rested well -free of pain.

## 2020-07-29 NOTE — ASSESSMENT & PLAN NOTE
54F with h/o CAD/PAD with recent cardiac stent with plans for outpatient revascularization of lower extremity in next few weeks. Patient with osteomyelitis of L foot based on positive bone cultures growing e.faecalis, group B strep and enterobacter. Note path with viable bone without inflammatory infiltrates or other significant histopathologic abnormalities. Pt has a labeled penicillin allergy, but says that she thinks it caused itching in past and denies any facial swelling or breathing issues.     Recommendations:   - challenge with zosyn (pt agreeable). If tolerates, plan for zosyn x 6 weeks. Otherwise, planning on vanc/cefepime x 6 weeks for osteo. Can not use quinolones with amiodarone.   - please arrange home health  -  Ultimately needs adequate blood flow to heal wound and sounds like there are outpatient plans for revascularization.       Weekly outpatient laboratory on Monday or Tuesday while on IV antibiotics.    CBC   CMP    ESR and CRP    Fax laboratory results to [facility] at [phone] with attn: Dr Garcia    Outpatient Infectious Diseases clinic follow up will be arranged and found in patient calendar.    Prior to discharge, please ensure the patient's follow-up has been scheduled.  If there is still no follow-up scheduled in Infectious Diseases clinic, please send an EPIC message to Adela Kapoor LPN in Infectious Diseases.

## 2020-07-29 NOTE — UM SECONDARY REVIEW
Medical Affairs    IP Extended Stay > 10  Hospital Day # 12  Suyapa Connelly is a 54 y.o. female that (in part)  has a past medical history of Anxiety, Depression, Diabetes mellitus, GERD (gastroesophageal reflux disease), Hyperlipemia, Hypertension, and Myocardial infarction.  has a past surgical history that includes Angiogram - Right Extremity (Right, 7/9/15); angiogram-left leg (10/6/15); Catheterization of both left and right heart (N/A, 12/18/2019); Coronary angiography (N/A, 12/18/2019); Coronary Artery Bypass Graft (CABG) (N/A, 1/14/2020); and Insertion of tunneled central venous hemodialysis catheter (N/A, 1/27/2020). Presents to [facility] complaining of the left heel foot wound.  MRI confirmed osteo. Podiatry, ID, and vascular were consulted.  Podiatry discussed options with patient. She has chosen 6 weeks of IV abx. ID has been consulted to tailor abx. She has been continued on Aztreonam.  Blood cultures were NG. Bone bx on 7/18 has grown Enterococcus faecalis, Grp B Strep, and Enterobacter cloacae. Vascular Surgery consulted and did an angiogram which revealed adequate artery for access the left SFA and AK popliteal artery occlusion.  Patient with renal failure and Nephrology consulted.  Renal function continues to improve and ok to proceed with vascular intervention.  Podiatry planning debridement post vascular procedure.  Creatinine peaked at 2.4 and is currently 1.2. patient had a stress test on 7/27 that showed reversible defects in anterior, inferior and lateral walls. Podiatry signed off. ID consulted to make final ID recs for home.  Patient had an abnormal stress test and went for Angio on 7/28 with stent to Cx. Cards continued to follow the patient.  Nephrology followed for renal failure   Creatinine now 1.7, Waiting for final recs from ID,   ID plans to discuss patient allergy to PCN with her today to determine if it is a true allergy and then  will make final rec. for home IV ABX therapy      {[facility] Review Outcome:[id number]}

## 2020-07-29 NOTE — ASSESSMENT & PLAN NOTE
Uncontrolled with hyperglycemia. Manifestations likely of CKD 3      - Will check an A1c- > 14.  - Glucose increased secondary to steroids.  - Added insulin with meals and will increase prandial insulin to 8U and increased basal to 15U for better control of blood glucose   -improved control    Uncontrolled sugars.  Increased insulin today

## 2020-07-29 NOTE — PROGRESS NOTES
Pharmacokinetic Assessment Follow Up: IV Vancomycin    Vancomycin serum concentration assessment(s):    The trough level was drawn correctly and can be used to guide therapy at this time. The measurement is within the desired definitive target range of 10 to 20 mcg/mL.    Vancomycin Regimen Plan:    Continue regimen to Vancomycin 1250 mg IV every 24 hours with next serum trough concentration measured at 20:00 prior to third dose on 7/31/2020    Drug levels (last 3 results):  Recent Labs   Lab Result Units 07/26/20  1037 07/28/20  1649   Vancomycin, Random ug/mL 12.5  --    Vancomycin-Trough ug/mL  --  16.2       Pharmacy will continue to follow and monitor vancomycin.    Please contact pharmacy at extension [phone] for questions regarding this assessment.    Thank you for the consult,   Prerna Mora       Patient brief summary:  Suyapa Connelly is a 54 y.o. female initiated on antimicrobial therapy with IV Vancomycin for treatment of skin & soft tissue infection    The patient's current regimen is vancomycin 1250 mg q24h     Drug Allergies:   Review of patient's allergies indicates:   Allergen Reactions    Ciprofloxacin      Itchiness    Contrast media      Kidney injury    Pcn [penicillins]      Rash; tolerated ceftriaxone on 1/13/20       Actual Body Weight:   90 kg     Renal Function:   Estimated Creatinine Clearance: 55.5 mL/min (based on SCr of 1.3 mg/dL).,     Dialysis Method (if applicable):  N/A    CBC (last 72 hours):  Recent Labs   Lab Result Units 07/26/20  2334   WBC K/uL 9.49   Hemoglobin g/dL 9.4*   Hematocrit % 29.5*   Platelets K/uL 362*   Gran% % 64.0   Lymph% % 22.3   Mono% % 10.0   Eosinophil% % 2.8   Basophil% % 0.6   Differential Method  Automated       Metabolic Panel (last 72 hours):  Recent Labs   Lab Result Units 07/26/20  0635 07/26/20  1224 07/27/20  0518 07/28/20  0424   Sodium mmol/L  --  136 136 136   Potassium mmol/L  --  4.2 4.1 4.2   Chloride mmol/L  --  104 104 105   CO2 mmol/L   --  28 28 26   Glucose mg/dL  --  186* 149* 104   BUN, Bld mg/dL  --  13 14 17   Creatinine mg/dL  --  1.2 1.3 1.3   Magnesium mg/dL 1.6  --  1.8 1.8   Phosphorus mg/dL 2.4*  --  2.6* 2.6*       Vancomycin Administrations:  vancomycin given in the last 96 hours                   vancomycin 1.25 g in dextrose 5% 250 mL IVPB (ready to mix) (mg) 1,250 mg New Bag 07/28/20 2029     1,250 mg New Bag 07/27/20 1422     1,250 mg New Bag 07/26/20 1229    vancomycin in dextrose 5 % 1 gram/250 mL IVPB 1,000 mg (mg) 1,000 mg New Bag 07/25/20 1137                Microbiologic Results:  Microbiology Results (last 7 days)     Procedure Component Value Units Date/Time    Blood Culture #2 **CANNOT BE ORDERED STAT** [[id number]] Collected: 07/17/20 1712    Order Status: Completed Specimen: Blood from Peripheral, Hand, Left Updated: 07/22/20 1903     Blood Culture, Routine No growth after 5 days.    Blood Culture #1 **CANNOT BE ORDERED STAT** [[id number]] Collected: 07/17/20 1631    Order Status: Completed Specimen: Blood from Peripheral, Antecubital, Right Updated: 07/22/20 1903     Blood Culture, Routine No growth after 5 days.

## 2020-07-29 NOTE — PROGRESS NOTES
SW to pt's room to discuss d/c planning and help at home. According to pt, she is aware she will d/c home with iv abx. According to pt, her daughter will help her at home and she will follow-up at the wound care clinic.

## 2020-07-29 NOTE — PROGRESS NOTES
[facility]  Nephrology  Progress Note    Patient Name: Suyapa Connelly  MRN: [id number]  Admission Date: 7/17/2020  Hospital Length of Stay: 12 days  Attending Provider: Hitesh Mason MD   Primary Care Physician: Erlinda Rahman NP  Principal Problem:Acute osteomyelitis of left calcaneus  Date of service 7/29/2020  Consults Reason for consult: ROSLYN on CKD  Subjective:     Interval History: 2.4 L UOP / 24 hrs. S/p coronary angio w/ contrast yesterday, stent to circumflex, doing well post op .Renal function worse today but hyperglycemic.     Review of patient's allergies indicates:   Allergen Reactions    Ciprofloxacin      Itchiness    Contrast media      Kidney injury    Pcn [penicillins]      Rash; tolerated ceftriaxone on 1/13/20     Current Facility-Administered Medications   Medication Frequency    0.9%  NaCl infusion Continuous    acetaminophen tablet 650 mg Q8H PRN    acetaminophen tablet 650 mg Q8H PRN    acetaminophen tablet 650 mg Q4H PRN    allopurinoL tablet 100 mg Daily    ALPRAZolam tablet 0.5 mg BID PRN    amiodarone tablet 400 mg Daily    aspirin chewable tablet 81 mg Daily    atorvastatin tablet 80 mg Daily    cadexomer iodine 0.9 % gel Daily    carvediloL tablet 6.25 mg Daily    cefepime in dextrose 5 % IVPB 2 g Q12H    clopidogreL tablet 75 mg Daily    dextrose 50% injection 12.5 g PRN    ergocalciferol capsule 50,000 Units Q7 Days    gabapentin capsule 100 mg TID    glucagon (human recombinant) injection 1 mg PRN    heparin (porcine) injection 5,000 Units Q8H    hydrALAZINE injection 10 mg Once    hydrALAZINE tablet 25 mg Q8H    insulin aspart U-100 pen 0-5 Units Q6H PRN    insulin aspart U-100 pen 8 Units TID WM    insulin detemir U-100 pen 15 Units Daily    melatonin tablet 6 mg Nightly PRN    morphine injection 2 mg Q4H PRN    morphine injection 3 mg Q1H PRN    morphine injection PRN    ondansetron injection 4 mg Q12H PRN    ondansetron  injection 8 mg Q8H PRN    oxyCODONE immediate release tablet 5 mg Q4H PRN    pantoprazole EC tablet 40 mg Daily    polyethylene glycol packet 17 g BID PRN    potassium, sodium phosphates 280-160-250 mg packet 2 packet Once    sodium chloride 0.9% flush 10 mL PRN    vancomycin - pharmacy to dose pharmacy to manage frequency    vancomycin 1.25 g in dextrose 5% 250 mL IVPB (ready to mix) Q24H       Objective:     Vital Signs (Most Recent):  Temp: 98.2 °F (36.8 °C) (07/29/20 0731)  Pulse: 93 (07/29/20 0731)  Resp: 18 (07/29/20 0742)  BP: (!) 143/58 (07/29/20 0731)  SpO2: 99 % (07/29/20 0831)  O2 Device (Oxygen Therapy): room air (07/29/20 0831) Vital Signs (24h Range):  Temp:  [98.2 °F (36.8 °C)-98.9 °F (37.2 °C)] 98.2 °F (36.8 °C)  Pulse:  [] 93  Resp:  [14-19] 18  SpO2:  [98 %-100 %] 99 %  BP: (116-182)/(58-90) 143/58     Weight: 90.5 kg (199 lb 8.3 oz) (07/29/20 0334)  Body mass index is 33.2 kg/m².  Body surface area is 2.04 meters squared.    I/O last 3 completed shifts:  In: -   Out: 2360 [Urine:2360]    Physical Exam  Vitals signs and nursing note reviewed.   Constitutional:       General: She is not in acute distress.     Appearance: Normal appearance. She is well-developed. She is not ill-appearing or diaphoretic.   HENT:      Head: Normocephalic and atraumatic.   Eyes:      General: No scleral icterus.        Right eye: No discharge.         Left eye: No discharge.   Cardiovascular:      Rate and Rhythm: Normal rate and regular rhythm.      Heart sounds: Normal heart sounds. No murmur. No friction rub.   Pulmonary:      Effort: Pulmonary effort is normal. No respiratory distress.      Breath sounds: Rales (bibasilar ) present. No wheezing.   Abdominal:      General: There is no distension.      Palpations: Abdomen is soft. There is no mass.      Tenderness: There is no abdominal tenderness.   Musculoskeletal:         General: Swelling (trace- mild pitting edema to BLE) present. No deformity.       Right lower leg: No edema (mild pitting BLE).      Left lower leg: No edema.   Skin:     General: Skin is warm and dry.      Findings: No rash.      Comments: L foot wound with bandages   Neurological:      Mental Status: She is alert and oriented to person, place, and time.   Psychiatric:         Mood and Affect: Mood normal.         Behavior: Behavior normal.         Thought Content: Thought content normal.         Significant Labs:   CBC:   Recent Labs   Lab 07/29/20  0008   WBC 13.03*   RBC 3.30*   HGB 10.0*   HCT 31.7*   *   MCV 96   MCH 30.3   MCHC 31.5*     CMP:   Recent Labs   Lab 07/29/20  0340   *   CALCIUM 8.3*   *   K 4.3   CO2 22*      BUN 28*   CREATININE 1.7*     Microbiology Results (last 7 days)     Procedure Component Value Units Date/Time    Blood Culture #2 **CANNOT BE ORDERED STAT** [[id number]] Collected: 07/17/20 1712    Order Status: Completed Specimen: Blood from Peripheral, Hand, Left Updated: 07/22/20 1903     Blood Culture, Routine No growth after 5 days.    Blood Culture #1 **CANNOT BE ORDERED STAT** [[id number]] Collected: 07/17/20 1631    Order Status: Completed Specimen: Blood from Peripheral, Antecubital, Right Updated: 07/22/20 1903     Blood Culture, Routine No growth after 5 days.        Recent Labs   Lab 07/29/20  0040   COLORU Yellow   SPECGRAV >1.030*   PHUR 5.0   PROTEINUA 3+*   BACTERIA None   NITRITE Negative   LEUKOCYTESUR Trace*   UROBILINOGEN Negative   HYALINECASTS 0     All labs within the past 24 hours have been reviewed.    Significant Imaging:  X-Ray: Reviewed    Assessment/Plan:     ROSLYN on CKD 3   - Has hx of HD dependant ROSLYN (LEVI) requiring RRT from 1/16/20 -3/10/20  - Recent baseline Crappears to be 1.4 -1.9 (GFR 30-43%); suspect underlying diabetic nephropathy Hgb A 1c > 14  - s/p IV contrast yesterday. Renal function markedly worse today but very hyperglycemic  - consult pharmacy to mix all IVPB in NS instead of D5W when able  - HOLD  further IVF, will also hold lasix for today in light of worsening Cr; repeat PM labs  - caution with IVF in advanced CKD patients - monitor O2 closely  - Repeat UA looks like ROSLYN; no yeast, but will treat empirically with diflucan 150 mg PO x1 (no renal adjustment necessary for single dose)  - renally dose meds for current GFR/ avoid nephrotoxic meds  - strict Is/ Os/ daily weights  - daily labs while admitted     Hypervolemia   - continue to hold IVF, monitor I&Os, daily weights  - mildly hypervolemic  - hold lasix today, re asess tomorrow     Metabolic Acidosis   - Bicarb trending up  - d/t ROSLYN + CKD  - PM labs, monitor for need to restart bicarb  - AM labs     CKD MBD  - PTH 78 and Vit D 7, phos trending down.   - phos wnl today; replace PRN  - continue ergo   - labs in AM     Hypomagnesemia  - Mg trending down   - Replace today  - Monitor      Proteinuria   - UPC 2.5 g   - would benefit fron ACE/ ARB once ROSLYN resolves & procedures are complete   - repeat UA today     Hyperuricemia   - continue allopurinol 100 mg PO QD    Acute osteomyelitis of L foot, non-healing wound  CAD s/p CABG  DM  PAD    Thank you for allowing me to participate in the care of your patient.  Please call with any questions.    Date of service: 7/29/2020  Rose Coker PA-C  Nephrology  [facility]  Office [phone]   [facility] [place]

## 2020-07-29 NOTE — PROGRESS NOTES
[facility] Hospital Medicine  Progress Note    Patient Name: Suyapa Connelly  MRN: [id number]  Patient Class: IP- Inpatient   Admission Date: 7/17/2020  Length of Stay: 12 days  Attending Physician: Hitesh Mason MD  Primary Care Provider: Erlinda Rahman NP        Subjective:     Principal Problem:Acute osteomyelitis of left calcaneus        HPI:  Suyapa Connelly is a 54 y.o. female that (in part)  has a past medical history of Anxiety, Depression, Diabetes mellitus, GERD (gastroesophageal reflux disease), Hyperlipemia, Hypertension, and Myocardial infarction. Patient has a past surgical history that includes Angiogram - Right Extremity (Right, 7/9/15); angiogram-left leg (10/6/15); Catheterization of both left and right heart (N/A, 12/18/2019); Coronary angiography (N/A, 12/18/2019); Coronary Artery Bypass Graft (CABG) (N/A, 1/14/2020); and Insertion of tunneled central venous hemodialysis catheter (N/A, 1/27/2020). Presents to [facility] Emergency Department complaining of the left worsening heel foot wound. Poor wound healing.  Patient became concerned due to malodorous drainage.  Pain with ambulation and weight-bearing.  Associated hyperglycemia.  Denies fever chills.    In the emergency department physical exam concerning for osteomyelitis secondary to diabetic foot ulcer in the setting of uncontrolled diabetes.  Routine laboratory studies revealed hyperglycemia.  X-ray of the foot performed.  Concerning for osteomyelitis.  MRI confirmed this.  Podiatry was consulted.  Broad-spectrum antibiotics were initiated in the ED a special consideration given to risk of Pseudomonas infection.    Hospital medicine has been asked to admit to inpatient for further evaluation and treatment.     Overview/Hospital Course:  Suyapa Connelly is a 54 y.o. female that (in part)  has a past medical history of Anxiety, Depression, Diabetes mellitus, GERD (gastroesophageal reflux  disease), Hyperlipemia, Hypertension, and Myocardial infarction.  has a past surgical history that includes Angiogram - Right Extremity (Right, 7/9/15); angiogram-left leg (10/6/15); Catheterization of both left and right heart (N/A, 12/18/2019); Coronary angiography (N/A, 12/18/2019); Coronary Artery Bypass Graft (CABG) (N/A, 1/14/2020); and Insertion of tunneled central venous hemodialysis catheter (N/A, 1/27/2020). Presents to [facility] Emergency Department complaining of the left heel foot wound.  MRI confirmed osteo. Podiatry, ID, and vascular were consulted.  Podiatry discussed options with patient. She has chosen 6 weeks of IV abx. ID has been consulted to tailor abx. She has been continued on Aztreonam.  Blood cultures were NG. Bone bx on 7/18 has grown Enterococcus faecalis, Grp B Strep, and Enterobacter cloacae. Vascular Surgery consulted and did an angiogram which revealed adequate artery for access the left SFA and AK popliteal artery occlusion.  Patient with renal failure and Nephrology consulted.  Renal function continues to improve and ok to proceed with vascular intervention.  Podiatry planning debridement post vascular procedure.  Creatinine peaked at 2.4 and is currently 1.2. patient had a stress test on 7/27 that showed reversible defects in anterior, inferior and lateral walls. Podiatry signed off. ID consulted to make final ID recs for home.  Patient had an abnormal stress test and went for Angio on 7/28 with stent to Cx. Cards continued to follow the patient.  Nephrology followed for renal failure     Interval History: No new issues       Review of Systems   Constitutional: Negative for activity change.   HENT: Negative for congestion.    Respiratory: Negative for chest tightness and shortness of breath.    Cardiovascular: Negative for chest pain.   Gastrointestinal: Negative for abdominal pain.   Genitourinary: Negative for difficulty urinating.   Musculoskeletal:  Negative for arthralgias.   Psychiatric/Behavioral: Negative for agitation and behavioral problems.     Objective:     Vital Signs (Most Recent):  Temp: 98.2 °F (36.8 °C) (07/29/20 0731)  Pulse: 93 (07/29/20 0731)  Resp: 18 (07/29/20 0742)  BP: (!) 143/58 (07/29/20 0731)  SpO2: 99 % (07/29/20 0831) Vital Signs (24h Range):  Temp:  [98.2 °F (36.8 °C)-98.9 °F (37.2 °C)] 98.2 °F (36.8 °C)  Pulse:  [] 93  Resp:  [14-19] 18  SpO2:  [98 %-100 %] 99 %  BP: (116-182)/(58-90) 143/58     Weight: 90.5 kg (199 lb 8.3 oz)  Body mass index is 33.2 kg/m².    Intake/Output Summary (Last 24 hours) at 7/29/2020 1115  Last data filed at 7/29/2020 0334  Gross per 24 hour   Intake --   Output 2360 ml   Net -2360 ml      Physical Exam  Vitals signs and nursing note reviewed.   Constitutional:       General: She is not in acute distress.     Appearance: Normal appearance. She is obese. She is not ill-appearing, toxic-appearing or diaphoretic.   HENT:      Head: Normocephalic and atraumatic.   Pulmonary:      Effort: No respiratory distress.   Neurological:      Mental Status: She is alert and oriented to person, place, and time.   Psychiatric:         Mood and Affect: Mood normal.         Behavior: Behavior normal.         Significant Labs:   BMP:   Recent Labs   Lab 07/28/20  0424 07/29/20  0340    354*    130*   K 4.2 4.3    102   CO2 26 22*   BUN 17 28*   CREATININE 1.3 1.7*   CALCIUM 8.5* 8.3*   MG 1.8  --      CBC:   Recent Labs   Lab 07/29/20  0008   WBC 13.03*   HGB 10.0*   HCT 31.7*   *       Significant Imaging:       Assessment/Plan:      * Acute osteomyelitis of left calcaneus  - MRI confirms osteo. Bone Bx- 7/18  - Afebrile and with resolved leukocytosis   - Wound cultures with Enterococcus, E cloacae, and GBS.   - ID consulted for ABx recommendations and will d/c axtreoman and start cefepime 2g q12h; continue vancomycin   - Vascular Surgery consulted and planning angiogram 7/24/2020.  - Patient  declines amputation and wants to try conservative tx with  IV ABx's.   - CKD and Nephrology consulted.  Vascular discussed case with Nephrology and patient cleared from renal for vascular intervention.    - Podiatry planning debridement, if she remains 7/31 they will come assess for any procedure.  - Vascular surgery following with workup for revascularization procedure that she will need for healing. Vein mapping and Angiogram complete, uncertain timing for revascularization procedure  -stress test today to complete cardiac workup-     Multiple wall motion reversible defects.     Will follow ID recs for discharge.         Preop cardiovascular exam  Stent placed to Cx on 7/28.         Hyponatremia  - resolved  - Monitor with daily labs      Stage 3 chronic kidney disease  Appreciate Nephrology input.  Patient did have acute on chronic CKD 3 which has now resolved  Hyperkalemia on 7/21.  Treated with Calcium, Albuterol and Kayexalate.  Resolved     With some acute renal failure today- likely pre-renal from elevated glucose         Non healing left heel wound  Continue current wound care  Provide adequate analgesia prior to daily wound care      S/P CABG x 1  No acute issues     -stress test today for cardiac clearance    Mixed hyperlipidemia  Stable      Chronic combined systolic and diastolic heart failure  No acute issues   EF 50% on recent echo         Essential hypertension  Continue current management.  Adequate control      Type 2 diabetes mellitus without complication, with long-term current use of insulin  Uncontrolled with hyperglycemia. Manifestations likely of CKD 3      - Will check an A1c- > 14.  - Glucose increased secondary to steroids.  - Added insulin with meals and will increase prandial insulin to 8U and increased basal to 15U for better control of blood glucose   -improved control    Uncontrolled sugars.  Increased insulin today              Peripheral artery disease  Patient requires  revascularization of the left lower extremity. Angiogram completed 7/24 with bilateral occlusive disease. Vascular surgery following and will need revascularization        VTE Risk Mitigation (From admission, onward)         Ordered     heparin (porcine) injection 5,000 Units  Every 8 hours      07/17/20 2217     IP VTE HIGH RISK PATIENT  Once      07/17/20 2217     Place sequential compression device  Until discontinued      07/17/20 2217     Place HAILEY hose  Until discontinued      07/17/20 2217              Follow renal function. ID recs for discharge. Will likely d/c to home on 7/30 if no further vascular in house procedures.             Hitesh Barrow MD  Department of Hospital Medicine   [facility]

## 2020-07-29 NOTE — NURSING
Received shift handoff from [facility]. Patient resting comfortably-flat at present. Right groin puncture site with dressing clean, dry and intact. Site free of hans bleeding or hematoma. VS stable, right pedal pulse weak but palpable. Discussed plan of care, pain management and safety measures with patient. Left heel dressing clean, dry and intact-off loading soft boot on. Pure wick female external catheter to suction-patient tolerating well.

## 2020-07-29 NOTE — PROGRESS NOTES
Therapy with vancomycin completed and/or consult discontinued by provider.  Pharmacy will sign off, please re-consult as needed.

## 2020-07-29 NOTE — SUBJECTIVE & OBJECTIVE
Interval History: No new issues       Review of Systems   Constitutional: Negative for activity change.   HENT: Negative for congestion.    Respiratory: Negative for chest tightness and shortness of breath.    Cardiovascular: Negative for chest pain.   Gastrointestinal: Negative for abdominal pain.   Genitourinary: Negative for difficulty urinating.   Musculoskeletal: Negative for arthralgias.   Psychiatric/Behavioral: Negative for agitation and behavioral problems.     Objective:     Vital Signs (Most Recent):  Temp: 98.2 °F (36.8 °C) (07/29/20 0731)  Pulse: 93 (07/29/20 0731)  Resp: 18 (07/29/20 0742)  BP: (!) 143/58 (07/29/20 0731)  SpO2: 99 % (07/29/20 0831) Vital Signs (24h Range):  Temp:  [98.2 °F (36.8 °C)-98.9 °F (37.2 °C)] 98.2 °F (36.8 °C)  Pulse:  [] 93  Resp:  [14-19] 18  SpO2:  [98 %-100 %] 99 %  BP: (116-182)/(58-90) 143/58     Weight: 90.5 kg (199 lb 8.3 oz)  Body mass index is 33.2 kg/m².    Intake/Output Summary (Last 24 hours) at 7/29/2020 1115  Last data filed at 7/29/2020 0334  Gross per 24 hour   Intake --   Output 2360 ml   Net -2360 ml      Physical Exam  Vitals signs and nursing note reviewed.   Constitutional:       General: She is not in acute distress.     Appearance: Normal appearance. She is obese. She is not ill-appearing, toxic-appearing or diaphoretic.   HENT:      Head: Normocephalic and atraumatic.   Pulmonary:      Effort: No respiratory distress.   Neurological:      Mental Status: She is alert and oriented to person, place, and time.   Psychiatric:         Mood and Affect: Mood normal.         Behavior: Behavior normal.         Significant Labs:   BMP:   Recent Labs   Lab 07/28/20  0424 07/29/20  0340    354*    130*   K 4.2 4.3    102   CO2 26 22*   BUN 17 28*   CREATININE 1.3 1.7*   CALCIUM 8.5* 8.3*   MG 1.8  --      CBC:   Recent Labs   Lab 07/29/20  0008   WBC 13.03*   HGB 10.0*   HCT 31.7*   *       Significant Imaging:

## 2020-07-29 NOTE — PROGRESS NOTES
[facility]  Cardiology  Progress Note    Patient Name: Suyapa Connelly  MRN: [id number]  Admission Date: 7/17/2020  Hospital Length of Stay: 12 days  Code Status: Full Code   Attending Physician: Hitesh Mason MD   Primary Care Physician: Erlinda Rahman NP  Expected Discharge Date: 7/29/2020  Principal Problem:Acute osteomyelitis of left calcaneus    Subjective:       Interval History:  Patient doing fine.  Denies any chest pains at rest on exertion.  No complications.  No bleeding.  Underwent PCI of circumflex yesterday.    Review of Systems   Constitution: Negative.   HENT: Negative.    Eyes: Negative.    Cardiovascular: Positive for claudication.   Respiratory: Negative.    Endocrine: Negative.    Hematologic/Lymphatic: Negative.    Skin: Positive for poor wound healing.   Musculoskeletal: Negative.    Gastrointestinal: Positive for nausea and vomiting.   Genitourinary: Negative.    Neurological: Negative.    Psychiatric/Behavioral: Negative.    Allergic/Immunologic: Negative.      Objective:     Vital Signs (Most Recent):  Temp: 98.2 °F (36.8 °C) (07/29/20 0731)  Pulse: 93 (07/29/20 0731)  Resp: 18 (07/29/20 0742)  BP: (!) 143/58 (07/29/20 0731)  SpO2: 99 % (07/29/20 0831) Vital Signs (24h Range):  Temp:  [98.2 °F (36.8 °C)-98.9 °F (37.2 °C)] 98.2 °F (36.8 °C)  Pulse:  [] 93  Resp:  [14-19] 18  SpO2:  [98 %-100 %] 99 %  BP: (116-182)/(58-90) 143/58     Weight: 90.5 kg (199 lb 8.3 oz)  Body mass index is 33.2 kg/m².     SpO2: 99 %  O2 Device (Oxygen Therapy): room air      Intake/Output Summary (Last 24 hours) at 7/29/2020 0922  Last data filed at 7/29/2020 0334  Gross per 24 hour   Intake --   Output 2360 ml   Net -2360 ml       Lines/Drains/Airways     Peripheral Intravenous Line                 Midline Catheter Insertion/Assessment  - Single Lumen 07/18/20 1640 basilic vein (medial side of arm) 18g x 10cm 10 days                Physical Exam   Constitutional: She is oriented to  person, place, and time. She appears well-developed and well-nourished.   HENT:   Head: Normocephalic.   Eyes: Pupils are equal, round, and reactive to light.   Neck: Normal range of motion. Neck supple.   Cardiovascular: Normal rate and regular rhythm.   Pulmonary/Chest: Effort normal and breath sounds normal.   Abdominal: Soft. Normal appearance and bowel sounds are normal. There is no abdominal tenderness.   Musculoskeletal:      Comments: Left lower extremity in wound care dressing   Neurological: She is alert and oriented to person, place, and time.   Skin: Skin is warm.   Psychiatric: She has a normal mood and affect.   Nursing note and vitals reviewed.      Significant Labs:   BMP:   Recent Labs   Lab 07/28/20  0424 07/29/20  0340    354*    130*   K 4.2 4.3    102   CO2 26 22*   BUN 17 28*   CREATININE 1.3 1.7*   CALCIUM 8.5* 8.3*   MG 1.8  --    , CMP   Recent Labs   Lab 07/28/20  0424 07/29/20  0340    130*   K 4.2 4.3    102   CO2 26 22*    354*   BUN 17 28*   CREATININE 1.3 1.7*   CALCIUM 8.5* 8.3*   ANIONGAP 5* 6*   ESTGFRAFRICA 54* 39*   EGFRNONAA 47* 34*   , CBC   Recent Labs   Lab 07/29/20  0008   WBC 13.03*   HGB 10.0*   HCT 31.7*   *   , INR No results for input(s): INR, PROTIME in the last 48 hours., Lipid Panel No results for input(s): CHOL, HDL, LDLCALC, TRIG, CHOLHDL in the last 48 hours., Troponin No results for input(s): TROPONINI in the last 48 hours. and All pertinent lab results from the last 24 hours have been reviewed.    Significant Imaging: Echocardiogram:   Transthoracic echo (TTE) complete (Cupid Only):   Results for orders placed or performed during the hospital encounter of 07/17/20   Echo Color Flow Doppler? Yes   Result Value Ref Range    Ascending aorta 2.65 cm    STJ 2.48 cm    AV mean gradient 5 mmHg    Ao peak niya 1.43 m/s    Ao VTI 37.05 cm    IVRT 121.11 msec    IVS 1.39 (A) 0.6 - 1.1 cm    LA size 3.69 cm    Left Atrium Major  Axis 5.22 cm    Left Atrium Minor Axis 5.09 cm    LVIDD 4.34 3.5 - 6.0 cm    LVIDS 3.32 2.1 - 4.0 cm    LVOT diameter 2.08 cm    LVOT peak VTI 25.57 cm    PW 1.36 (A) 0.6 - 1.1 cm    MV Peak A Ty 1.05 m/s    E wave decelartion time 205.80 msec    MV Peak E Ty 0.76 m/s    PV Peak D Ty 0.28 m/s    PV Peak S Ty 0.60 m/s    RA Major Axis 5.20 cm    RA Width 3.92 cm    RVDD 3.73 cm    Sinus 2.72 cm    TAPSE 1.40 cm    TR Max Ty 2.90 m/s    LA WIDTH 4.49 cm    Ao root annulus 2.92 cm    AORTIC VALVE CUSP SEPERATION 1.49 cm    PV PEAK VELOCITY 0.88 cm/s    LV Diastolic Volume 84.78 mL    LV Systolic Volume 44.71 mL    RV S' 10.71 cm/s    LVOT peak ty 0.99 m/s    FS 24 %    LA volume 72.59 cm3    LV mass 229.11 g    Left Ventricle Relative Wall Thickness 0.63 cm    AV valve area 2.34 cm2    AV Velocity Ratio 0.69     AV index (prosthetic) 0.69     E/A ratio 0.72     Pulm vein S/D ratio 2.14     LVOT area 3.4 cm2    LVOT stroke volume 86.84 cm3    AV peak gradient 8 mmHg    LV Systolic Volume Index 23.0 mL/m2    LV Diastolic Volume Index 43.61 mL/m2    LA Volume Index 37.3 mL/m2    LV Mass Index 118 g/m2    Triscuspid Valve Regurgitation Peak Gradient 34 mmHg    BSA 2 m2    Right Atrial Pressure (from IVC) 3 mmHg    TV rest pulmonary artery pressure 37 mmHg    Narrative    · Low normal left ventricular systolic function. The estimated ejection   fraction is 50%.  · Concentric left ventricular hypertrophy.  · Indeterminate left ventricular diastolic function.  · Normal right ventricular systolic function.  · Mild left atrial enlargement.  · Mild aortic regurgitation.  · Mild mitral regurgitation.  · Mild tricuspid regurgitation.  · Normal central venous pressure (3 mmHg).  · The estimated PA systolic pressure is 37 mmHg.        Assessment and Plan:     Brief HPI:     Preop cardiovascular exam  Cardiology consultation has been obtained for preoperative risk assessment prior to surgery.  Patient does have moderate OM2  disease.  Plans were to revascularized that prior to surgery.  I personally reviewed the coronary angiogram and it has moderate disease.  Unknown ischemic significance.  Patient can not walk around much because of the left foot.  We will look at the ischemic significance of this lesion with the help of a stress test.  So that appropriate risk assessment can be made prior to vascular surgery.    July 26, 2020:  Vascular surgery planned in 2-3 weeks as per Dr. adams.  Further evaluation with the help stress test in a.m..    July 27, 2020:  Stress test today    July 29, 2020:  Patient underwent PCI of circumflex.  Doing fine.  No complications.  Continue aspirin 81 mg daily indefinitely and Plavix 75 mg daily uninterrupted for at least 1 year and longer if no contraindications.  Patient may proceed for vascular surgery as she has been revascularized  (the RCA  will be medically managed).  Will sign off.  Follow-up in Cardiology Clinic.    Stage 3 chronic kidney disease  Improved    Non healing left heel wound  Management per vascular surgery and Podiatry    S/P CABG x 1  Status post LIMA to LAD.  Moderate disease in OM2.    Chronic combined systolic and diastolic heart failure  Currently euvolemic.  EF 50%.    Essential hypertension  Well controlled on current therapy.  Continue antihypertensives and titrate as needed.    Type 2 diabetes mellitus without complication, with long-term current use of insulin  Management per primary    Peripheral artery disease  Being managed by vascular surgery        VTE Risk Mitigation (From admission, onward)         Ordered     heparin (porcine) injection 5,000 Units  Every 8 hours      07/17/20 2217     IP VTE HIGH RISK PATIENT  Once      07/17/20 2217     Place sequential compression device  Until discontinued      07/17/20 2217     Place HAILEY hose  Until discontinued      07/17/20 2217                Rachel Easley MD  Cardiology  [facility]

## 2020-07-29 NOTE — ASSESSMENT & PLAN NOTE
Appreciate Nephrology input.  Patient did have acute on chronic CKD 3 which has now resolved  Hyperkalemia on 7/21.  Treated with Calcium, Albuterol and Kayexalate.  Resolved     With some acute renal failure today- likely pre-renal from elevated glucose

## 2020-07-29 NOTE — CONSULTS
[facility] Pt would like to follow-up with Dr. Easley at d/c. Appointment scheduled on 9/28 due to insurance, however, pt has been added to wait list.

## 2020-07-29 NOTE — ASSESSMENT & PLAN NOTE
Cardiology consultation has been obtained for preoperative risk assessment prior to surgery.  Patient does have moderate OM2 disease.  Plans were to revascularized that prior to surgery.  I personally reviewed the coronary angiogram and it has moderate disease.  Unknown ischemic significance.  Patient can not walk around much because of the left foot.  We will look at the ischemic significance of this lesion with the help of a stress test.  So that appropriate risk assessment can be made prior to vascular surgery.    July 26, 2020:  Vascular surgery planned in 2-3 weeks as per Dr. adams.  Further evaluation with the help stress test in a.m..    July 27, 2020:  Stress test today    July 29, 2020:  Patient underwent PCI of circumflex.  Doing fine.  No complications.  Continue aspirin 81 mg daily indefinitely and Plavix 75 mg daily uninterrupted for at least 1 year and longer if no contraindications.  Patient may proceed for vascular surgery as she has been revascularized  (the RCA  will be medically managed).  Will sign off.  Follow-up in Cardiology Clinic.

## 2020-07-29 NOTE — PLAN OF CARE
Problem: Wound  Goal: Optimal Wound Healing  Outcome: Ongoing, Progressing  Intervention: Promote Effective Wound Healing  Flowsheets (Taken 7/29/2020 0509)  Oral Nutrition Promotion:   calorie dense foods provided   calorie dense liquids provided   rest periods promoted   safe use of adaptive equipment encouraged  Sleep/Rest Enhancement:   awakenings minimized   consistent schedule promoted   noise level reduced   regular sleep/rest pattern promoted   therapeutic touch utilized  Pain Management Interventions:   care clustered   diversional activity provided   pain management plan reviewed with patient/caregiver   quiet environment facilitated     Problem: Fall Injury Risk  Goal: Absence of Fall and Fall-Related Injury  Outcome: Ongoing, Progressing  Intervention: Identify and Manage Contributors to Fall Injury Risk  Flowsheets (Taken 7/29/2020 0509)  Self-Care Promotion:   independence encouraged   BADL personal routines maintained   BADL personal objects within reach  Medication Review/Management: infusion initiated     Problem: Diabetes Comorbidity  Goal: Blood Glucose Level Within Desired Range  Outcome: Ongoing, Progressing  Intervention: Maintain Glycemic Control  Flowsheets (Taken 7/29/2020 0509)  Glycemic Management:   blood glucose monitoring   insulin dose matched to carbohydrate intake     Problem: Electrolyte Imbalance (Acute Kidney Injury/Impairment)  Goal: Serum Electrolyte Balance  Outcome: Ongoing, Progressing  Intervention: Monitor and Manage Electrolyte Imbalance  Flowsheets (Taken 7/29/2020 0509)  Fluid/Electrolyte Management: intravenous fluid replacement initiated     Problem: Infection  Goal: Infection Symptom Resolution  Outcome: Ongoing, Progressing  Intervention: Prevent or Manage Infection  Flowsheets (Taken 7/29/2020 0509)  Fever Reduction/Comfort Measures:   lightweight bedding   lightweight clothing   fluid intake increased  Infection Management: aseptic technique maintained  Isolation  Precautions: protective environment maintained

## 2020-07-29 NOTE — SUBJECTIVE & OBJECTIVE
Interval History: NAEO. S/p cardiac stent. Denies complaints. Pt reports her pencillin allergy was itching skin. Denies anaphylaxis to any antibiotic. Denies facial swelling    Review of Systems   Constitutional: Negative for activity change.   HENT: Negative for congestion.    Respiratory: Negative for chest tightness and shortness of breath.    Cardiovascular: Negative for chest pain.   Gastrointestinal: Negative for abdominal pain.   Genitourinary: Negative for difficulty urinating.   Musculoskeletal: Negative for arthralgias.   Psychiatric/Behavioral: Negative for agitation and behavioral problems.     Objective:     Vital Signs (Most Recent):  Temp: 98.1 °F (36.7 °C) (07/29/20 1121)  Pulse: 84 (07/29/20 1121)  Resp: 18 (07/29/20 1121)  BP: (!) 143/67 (07/29/20 1121)  SpO2: 100 % (07/29/20 1121) Vital Signs (24h Range):  Temp:  [98.1 °F (36.7 °C)-98.9 °F (37.2 °C)] 98.1 °F (36.7 °C)  Pulse:  [] 84  Resp:  [14-19] 18  SpO2:  [98 %-100 %] 100 %  BP: (116-163)/(58-77) 143/67     Weight: 90.5 kg (199 lb 8.3 oz)  Body mass index is 33.2 kg/m².    Intake/Output Summary (Last 24 hours) at 7/29/2020 1408  Last data filed at 7/29/2020 0334  Gross per 24 hour   Intake --   Output 2310 ml   Net -2310 ml      Physical Exam  Vitals signs and nursing note reviewed.   Constitutional:       General: She is not in acute distress.     Appearance: Normal appearance. She is obese. She is not ill-appearing, toxic-appearing or diaphoretic.   HENT:      Head: Normocephalic and atraumatic.   Cardiovascular:      Rate and Rhythm: Normal rate and regular rhythm.      Comments: cabg scar  Pulmonary:      Effort: Pulmonary effort is normal. No respiratory distress.      Breath sounds: Normal breath sounds.   Musculoskeletal:      Comments: LLE bandaged. Dressings c/d/i   Skin:     Comments: picc line with some serosanguinous drainage on biopatch. One lumen   Neurological:      General: No focal deficit present.      Mental Status:  She is alert and oriented to person, place, and time.   Psychiatric:         Mood and Affect: Mood normal.         Behavior: Behavior normal.         Significant Labs:   BMP:   Recent Labs   Lab 07/28/20  0424 07/29/20  0340    354*    130*   K 4.2 4.3    102   CO2 26 22*   BUN 17 28*   CREATININE 1.3 1.7*   CALCIUM 8.5* 8.3*   MG 1.8  --      CBC:   Recent Labs   Lab 07/29/20  0008   WBC 13.03*   HGB 10.0*   HCT 31.7*   *       Significant Imaging:

## 2020-07-29 NOTE — ASSESSMENT & PLAN NOTE
- MRI confirms osteo. Bone Bx- 7/18  - Afebrile and with resolved leukocytosis   - Wound cultures with Enterococcus, E cloacae, and GBS.   - ID consulted for ABx recommendations and will d/c axtreoman and start cefepime 2g q12h; continue vancomycin   - Vascular Surgery consulted and planning angiogram 7/24/2020.  - Patient declines amputation and wants to try conservative tx with  IV ABx's.   - CKD and Nephrology consulted.  Vascular discussed case with Nephrology and patient cleared from renal for vascular intervention.    - Podiatry planning debridement, if she remains 7/31 they will come assess for any procedure.  - Vascular surgery following with workup for revascularization procedure that she will need for healing. Vein mapping and Angiogram complete, uncertain timing for revascularization procedure  -stress test today to complete cardiac workup-     Multiple wall motion reversible defects.     Will follow ID recs for discharge.

## 2020-07-29 NOTE — SUBJECTIVE & OBJECTIVE
Interval History:  Patient doing fine.  Denies any chest pains at rest on exertion.  No complications.  No bleeding.  Underwent PCI of circumflex yesterday.    Review of Systems   Constitution: Negative.   HENT: Negative.    Eyes: Negative.    Cardiovascular: Positive for claudication.   Respiratory: Negative.    Endocrine: Negative.    Hematologic/Lymphatic: Negative.    Skin: Positive for poor wound healing.   Musculoskeletal: Negative.    Gastrointestinal: Positive for nausea and vomiting.   Genitourinary: Negative.    Neurological: Negative.    Psychiatric/Behavioral: Negative.    Allergic/Immunologic: Negative.      Objective:     Vital Signs (Most Recent):  Temp: 98.2 °F (36.8 °C) (07/29/20 0731)  Pulse: 93 (07/29/20 0731)  Resp: 18 (07/29/20 0742)  BP: (!) 143/58 (07/29/20 0731)  SpO2: 99 % (07/29/20 0831) Vital Signs (24h Range):  Temp:  [98.2 °F (36.8 °C)-98.9 °F (37.2 °C)] 98.2 °F (36.8 °C)  Pulse:  [] 93  Resp:  [14-19] 18  SpO2:  [98 %-100 %] 99 %  BP: (116-182)/(58-90) 143/58     Weight: 90.5 kg (199 lb 8.3 oz)  Body mass index is 33.2 kg/m².     SpO2: 99 %  O2 Device (Oxygen Therapy): room air      Intake/Output Summary (Last 24 hours) at 7/29/2020 0922  Last data filed at 7/29/2020 0334  Gross per 24 hour   Intake --   Output 2360 ml   Net -2360 ml       Lines/Drains/Airways     Peripheral Intravenous Line                 Midline Catheter Insertion/Assessment  - Single Lumen 07/18/20 1640 basilic vein (medial side of arm) 18g x 10cm 10 days                Physical Exam   Constitutional: She is oriented to person, place, and time. She appears well-developed and well-nourished.   HENT:   Head: Normocephalic.   Eyes: Pupils are equal, round, and reactive to light.   Neck: Normal range of motion. Neck supple.   Cardiovascular: Normal rate and regular rhythm.   Pulmonary/Chest: Effort normal and breath sounds normal.   Abdominal: Soft. Normal appearance and bowel sounds are normal. There is no  abdominal tenderness.   Musculoskeletal:      Comments: Left lower extremity in wound care dressing   Neurological: She is alert and oriented to person, place, and time.   Skin: Skin is warm.   Psychiatric: She has a normal mood and affect.   Nursing note and vitals reviewed.      Significant Labs:   BMP:   Recent Labs   Lab 07/28/20 0424 07/29/20  0340    354*    130*   K 4.2 4.3    102   CO2 26 22*   BUN 17 28*   CREATININE 1.3 1.7*   CALCIUM 8.5* 8.3*   MG 1.8  --    , CMP   Recent Labs   Lab 07/28/20  0424 07/29/20  0340    130*   K 4.2 4.3    102   CO2 26 22*    354*   BUN 17 28*   CREATININE 1.3 1.7*   CALCIUM 8.5* 8.3*   ANIONGAP 5* 6*   ESTGFRAFRICA 54* 39*   EGFRNONAA 47* 34*   , CBC   Recent Labs   Lab 07/29/20  0008   WBC 13.03*   HGB 10.0*   HCT 31.7*   *   , INR No results for input(s): INR, PROTIME in the last 48 hours., Lipid Panel No results for input(s): CHOL, HDL, LDLCALC, TRIG, CHOLHDL in the last 48 hours., Troponin No results for input(s): TROPONINI in the last 48 hours. and All pertinent lab results from the last 24 hours have been reviewed.    Significant Imaging: Echocardiogram:   Transthoracic echo (TTE) complete (Cupid Only):   Results for orders placed or performed during the hospital encounter of 07/17/20   Echo Color Flow Doppler? Yes   Result Value Ref Range    Ascending aorta 2.65 cm    STJ 2.48 cm    AV mean gradient 5 mmHg    Ao peak ty 1.43 m/s    Ao VTI 37.05 cm    IVRT 121.11 msec    IVS 1.39 (A) 0.6 - 1.1 cm    LA size 3.69 cm    Left Atrium Major Axis 5.22 cm    Left Atrium Minor Axis 5.09 cm    LVIDD 4.34 3.5 - 6.0 cm    LVIDS 3.32 2.1 - 4.0 cm    LVOT diameter 2.08 cm    LVOT peak VTI 25.57 cm    PW 1.36 (A) 0.6 - 1.1 cm    MV Peak A Ty 1.05 m/s    E wave decelartion time 205.80 msec    MV Peak E Ty 0.76 m/s    PV Peak D Ty 0.28 m/s    PV Peak S Ty 0.60 m/s    RA Major Axis 5.20 cm    RA Width 3.92 cm    RVDD 3.73 cm    Sinus  2.72 cm    TAPSE 1.40 cm    TR Max Ty 2.90 m/s    LA WIDTH 4.49 cm    Ao root annulus 2.92 cm    AORTIC VALVE CUSP SEPERATION 1.49 cm    PV PEAK VELOCITY 0.88 cm/s    LV Diastolic Volume 84.78 mL    LV Systolic Volume 44.71 mL    RV S' 10.71 cm/s    LVOT peak ty 0.99 m/s    FS 24 %    LA volume 72.59 cm3    LV mass 229.11 g    Left Ventricle Relative Wall Thickness 0.63 cm    AV valve area 2.34 cm2    AV Velocity Ratio 0.69     AV index (prosthetic) 0.69     E/A ratio 0.72     Pulm vein S/D ratio 2.14     LVOT area 3.4 cm2    LVOT stroke volume 86.84 cm3    AV peak gradient 8 mmHg    LV Systolic Volume Index 23.0 mL/m2    LV Diastolic Volume Index 43.61 mL/m2    LA Volume Index 37.3 mL/m2    LV Mass Index 118 g/m2    Triscuspid Valve Regurgitation Peak Gradient 34 mmHg    BSA 2 m2    Right Atrial Pressure (from IVC) 3 mmHg    TV rest pulmonary artery pressure 37 mmHg    Narrative    · Low normal left ventricular systolic function. The estimated ejection   fraction is 50%.  · Concentric left ventricular hypertrophy.  · Indeterminate left ventricular diastolic function.  · Normal right ventricular systolic function.  · Mild left atrial enlargement.  · Mild aortic regurgitation.  · Mild mitral regurgitation.  · Mild tricuspid regurgitation.  · Normal central venous pressure (3 mmHg).  · The estimated PA systolic pressure is 37 mmHg.

## 2020-07-29 NOTE — CONSULTS
[facility] [place]  Infectious Disease  Consult Note    Patient Name: Suyapa Connelly  MRN: [id number]  Admission Date: 7/17/2020  Hospital Length of Stay: 12 days  Attending Physician: Hitesh Mason MD  Primary Care Provider: Erlinda Rahman NP     Isolation Status: No active isolations    Patient information was obtained from patient and ER records.      Consults  Assessment/Plan:     * Acute osteomyelitis of left calcaneus  54F with h/o CAD/PAD with recent cardiac stent with plans for outpatient revascularization of lower extremity in next few weeks. Patient with osteomyelitis of L foot based on positive bone cultures growing e.faecalis, group B strep and enterobacter. Note path with viable bone without inflammatory infiltrates or other significant histopathologic abnormalities. Pt has a labeled penicillin allergy, but says that she thinks it caused itching in past and denies any facial swelling or breathing issues.     Recommendations:   - challenge with zosyn (pt agreeable). If tolerates, plan for zosyn x 6 weeks. Otherwise, planning on vanc/cefepime x 6 weeks for osteo. Can not use quinolones with amiodarone.   - please arrange home health  -  Ultimately needs adequate blood flow to heal wound and sounds like there are outpatient plans for revascularization.       Weekly outpatient laboratory on Monday or Tuesday while on IV antibiotics.    CBC   CMP    ESR and CRP    Fax laboratory results to [facility] at [phone] with attn: Dr Garcia    Outpatient Infectious Diseases clinic follow up will be arranged and found in patient calendar.    Prior to discharge, please ensure the patient's follow-up has been scheduled.  If there is still no follow-up scheduled in Infectious Diseases clinic, please send an EPIC message to Adela Kapoor LPN in Infectious Diseases.                    Thank you for your consult. I will follow-up with patient. Please contact us if you have any additional  questions.    Lindsay De Anda MD  Infectious Disease  [facility]    Subjective:     Principal Problem: Acute osteomyelitis of left calcaneus    HPI: A 54-year-old woman with CAD, HF-EF 35%, PAD, s/p bilateral lower extremity stents, DM 2,  Anxiety, depression, and chronic open wound of the left heel who developed malodorous drainage from her wound. This was associated with pain and hyperglycemia. She was evaluated on  ED where she was afebrile and without leukocytosis. Further work up revealed elevated creatinine level, an ESR of 139, and CRP of 9.2. MRI showed evidence of osteomyelitis of the calcaneous. She was evaluated by Podiatry and subsequently underwent bone biopsy for pathology and culture. Cultures with Enterococcus pending identification and sensitivities.     Mrs. Connelly has allergy to penicillin, Bactrim, and clindamycin. She has a pet dog. She smoke marijuana occasionally. She enjoys fishing.     Infectious Diseases consulted for management recommendations.       Interval History: NAEO. S/p cardiac stent. Denies complaints. Pt reports her pencillin allergy was itching skin. Denies anaphylaxis to any antibiotic. Denies facial swelling    Review of Systems   Constitutional: Negative for activity change.   HENT: Negative for congestion.    Respiratory: Negative for chest tightness and shortness of breath.    Cardiovascular: Negative for chest pain.   Gastrointestinal: Negative for abdominal pain.   Genitourinary: Negative for difficulty urinating.   Musculoskeletal: Negative for arthralgias.   Psychiatric/Behavioral: Negative for agitation and behavioral problems.     Objective:     Vital Signs (Most Recent):  Temp: 98.1 °F (36.7 °C) (07/29/20 1121)  Pulse: 84 (07/29/20 1121)  Resp: 18 (07/29/20 1121)  BP: (!) 143/67 (07/29/20 1121)  SpO2: 100 % (07/29/20 1121) Vital Signs (24h Range):  Temp:  [98.1 °F (36.7 °C)-98.9 °F (37.2 °C)] 98.1 °F (36.7 °C)  Pulse:  [] 84  Resp:   [14-19] 18  SpO2:  [98 %-100 %] 100 %  BP: (116-163)/(58-77) 143/67     Weight: 90.5 kg (199 lb 8.3 oz)  Body mass index is 33.2 kg/m².    Intake/Output Summary (Last 24 hours) at 7/29/2020 1408  Last data filed at 7/29/2020 0334  Gross per 24 hour   Intake --   Output 2310 ml   Net -2310 ml      Physical Exam  Vitals signs and nursing note reviewed.   Constitutional:       General: She is not in acute distress.     Appearance: Normal appearance. She is obese. She is not ill-appearing, toxic-appearing or diaphoretic.   HENT:      Head: Normocephalic and atraumatic.   Cardiovascular:      Rate and Rhythm: Normal rate and regular rhythm.      Comments: cabg scar  Pulmonary:      Effort: Pulmonary effort is normal. No respiratory distress.      Breath sounds: Normal breath sounds.   Musculoskeletal:      Comments: LLE bandaged. Dressings c/d/i   Skin:     Comments: picc line with some serosanguinous drainage on biopatch. One lumen   Neurological:      General: No focal deficit present.      Mental Status: She is alert and oriented to person, place, and time.   Psychiatric:         Mood and Affect: Mood normal.         Behavior: Behavior normal.         Significant Labs:   BMP:   Recent Labs   Lab 07/28/20  0424 07/29/20  0340    354*    130*   K 4.2 4.3    102   CO2 26 22*   BUN 17 28*   CREATININE 1.3 1.7*   CALCIUM 8.5* 8.3*   MG 1.8  --      CBC:   Recent Labs   Lab 07/29/20  0008   WBC 13.03*   HGB 10.0*   HCT 31.7*   *       Significant Imaging:

## 2020-07-29 NOTE — PLAN OF CARE
Problem: Wound  Goal: Optimal Wound Healing  Outcome: Ongoing, Progressing     Problem: Fall Injury Risk  Goal: Absence of Fall and Fall-Related Injury  Outcome: Ongoing, Progressing     Problem: Adult Inpatient Plan of Care  Goal: Plan of Care Review  Outcome: Ongoing, Progressing  Goal: Patient-Specific Goal (Individualization)  Outcome: Ongoing, Progressing  Goal: Absence of Hospital-Acquired Illness or Injury  Outcome: Ongoing, Progressing  Goal: Optimal Comfort and Wellbeing  Outcome: Ongoing, Progressing  Goal: Readiness for Transition of Care  Outcome: Ongoing, Progressing  Goal: Rounds/Family Conference  Outcome: Ongoing, Progressing     Problem: Hematologic Alteration (Acute Kidney Injury/Impairment)  Goal: Hemoglobin, Hematocrit and Platelets Within Normal Range  Outcome: Ongoing, Progressing     Problem: Oral Intake Inadequate (Acute Kidney Injury/Impairment)  Goal: Optimal Nutrition Intake  Outcome: Ongoing, Progressing     Problem: Renal Function Impairment (Acute Kidney Injury/Impairment)  Goal: Effective Renal Function  Outcome: Ongoing, Progressing     Problem: Infection  Goal: Infection Symptom Resolution  Outcome: Ongoing, Progressing     Problem: Infection  Goal: Infection Symptom Resolution  Outcome: Ongoing, Progressing

## 2020-07-30 VITALS
DIASTOLIC BLOOD PRESSURE: 69 MMHG | BODY MASS INDEX: 33.24 KG/M2 | RESPIRATION RATE: 16 BRPM | WEIGHT: 199.5 LBS | HEIGHT: 65 IN | OXYGEN SATURATION: 98 % | SYSTOLIC BLOOD PRESSURE: 135 MMHG | TEMPERATURE: 99 F | HEART RATE: 82 BPM

## 2020-07-30 PROBLEM — E87.1 HYPONATREMIA: Status: RESOLVED | Noted: 2020-07-22 | Resolved: 2020-07-30

## 2020-07-30 PROBLEM — Z01.810 PREOP CARDIOVASCULAR EXAM: Status: RESOLVED | Noted: 2020-07-25 | Resolved: 2020-07-30

## 2020-07-30 LAB
ANION GAP SERPL CALC-SCNC: 5 MMOL/L (ref 8–16)
BUN SERPL-MCNC: 30 MG/DL (ref 6–20)
CALCIUM SERPL-MCNC: 8.2 MG/DL (ref 8.7–10.5)
CHLORIDE SERPL-SCNC: 108 MMOL/L (ref 95–110)
CO2 SERPL-SCNC: 23 MMOL/L (ref 23–29)
CREAT SERPL-MCNC: 1.8 MG/DL (ref 0.5–1.4)
EST. GFR  (AFRICAN AMERICAN): 37 ML/MIN/1.73 M^2
EST. GFR  (NON AFRICAN AMERICAN): 32 ML/MIN/1.73 M^2
GLUCOSE SERPL-MCNC: 158 MG/DL (ref 70–110)
PHOSPHATE SERPL-MCNC: 2.9 MG/DL (ref 2.7–4.5)
POCT GLUCOSE: 153 MG/DL (ref 70–110)
POCT GLUCOSE: 174 MG/DL (ref 70–110)
POCT GLUCOSE: 74 MG/DL (ref 70–110)
POTASSIUM SERPL-SCNC: 4.4 MMOL/L (ref 3.5–5.1)
SODIUM SERPL-SCNC: 136 MMOL/L (ref 136–145)

## 2020-07-30 PROCEDURE — 99233 PR SUBSEQUENT HOSPITAL CARE,LEVL III: ICD-10-PCS | Mod: ,,, | Performed by: SURGERY

## 2020-07-30 PROCEDURE — 25000003 PHARM REV CODE 250: Performed by: NURSE PRACTITIONER

## 2020-07-30 PROCEDURE — 99233 SBSQ HOSP IP/OBS HIGH 50: CPT | Mod: ,,, | Performed by: INTERNAL MEDICINE

## 2020-07-30 PROCEDURE — 80048 BASIC METABOLIC PNL TOTAL CA: CPT

## 2020-07-30 PROCEDURE — 25000003 PHARM REV CODE 250: Performed by: STUDENT IN AN ORGANIZED HEALTH CARE EDUCATION/TRAINING PROGRAM

## 2020-07-30 PROCEDURE — 25000003 PHARM REV CODE 250: Performed by: INTERNAL MEDICINE

## 2020-07-30 PROCEDURE — 63600175 PHARM REV CODE 636 W HCPCS: Performed by: HOSPITALIST

## 2020-07-30 PROCEDURE — 99233 SBSQ HOSP IP/OBS HIGH 50: CPT | Mod: ,,, | Performed by: SURGERY

## 2020-07-30 PROCEDURE — 63600175 PHARM REV CODE 636 W HCPCS: Performed by: INTERNAL MEDICINE

## 2020-07-30 PROCEDURE — 36569 INSJ PICC 5 YR+ W/O IMAGING: CPT

## 2020-07-30 PROCEDURE — 99233 PR SUBSEQUENT HOSPITAL CARE,LEVL III: ICD-10-PCS | Mod: ,,, | Performed by: INTERNAL MEDICINE

## 2020-07-30 PROCEDURE — 36415 COLL VENOUS BLD VENIPUNCTURE: CPT

## 2020-07-30 PROCEDURE — 25000003 PHARM REV CODE 250: Performed by: PHYSICIAN ASSISTANT

## 2020-07-30 PROCEDURE — 84100 ASSAY OF PHOSPHORUS: CPT

## 2020-07-30 PROCEDURE — 94761 N-INVAS EAR/PLS OXIMETRY MLT: CPT

## 2020-07-30 PROCEDURE — 25000003 PHARM REV CODE 250: Performed by: HOSPITALIST

## 2020-07-30 PROCEDURE — C1751 CATH, INF, PER/CENT/MIDLINE: HCPCS

## 2020-07-30 RX ORDER — HYDRALAZINE HYDROCHLORIDE 25 MG/1
25 TABLET, FILM COATED ORAL EVERY 8 HOURS
Qty: 90 TABLET | Refills: 5 | Status: SHIPPED | OUTPATIENT
Start: 2020-07-30 | End: 2021-01-21

## 2020-07-30 RX ORDER — ALLOPURINOL 100 MG/1
100 TABLET ORAL DAILY
Qty: 30 TABLET | Refills: 5 | Status: ON HOLD | OUTPATIENT
Start: 2020-07-31 | End: 2023-05-02 | Stop reason: CLARIF

## 2020-07-30 RX ORDER — ATORVASTATIN CALCIUM 80 MG/1
80 TABLET, FILM COATED ORAL DAILY
Qty: 30 TABLET | Refills: 5 | Status: ON HOLD | OUTPATIENT
Start: 2020-07-30 | End: 2021-06-08 | Stop reason: HOSPADM

## 2020-07-30 RX ORDER — SODIUM CHLORIDE 0.9 % (FLUSH) 0.9 %
10 SYRINGE (ML) INJECTION
Status: DISCONTINUED | OUTPATIENT
Start: 2020-07-30 | End: 2020-07-30 | Stop reason: HOSPADM

## 2020-07-30 RX ORDER — OXYCODONE HYDROCHLORIDE 5 MG/1
5 TABLET ORAL EVERY 4 HOURS PRN
Qty: 25 TABLET | Refills: 0 | Status: SHIPPED | OUTPATIENT
Start: 2020-07-30 | End: 2020-08-12 | Stop reason: CLARIF

## 2020-07-30 RX ORDER — ESCITALOPRAM OXALATE 10 MG/1
10 TABLET ORAL DAILY
Qty: 30 TABLET | Refills: 5 | Status: SHIPPED | OUTPATIENT
Start: 2020-07-30 | End: 2021-02-10

## 2020-07-30 RX ORDER — INSULIN ASPART 100 [IU]/ML
9 INJECTION, SOLUTION INTRAVENOUS; SUBCUTANEOUS
Qty: 10 ML | Refills: 5 | Status: SHIPPED | OUTPATIENT
Start: 2020-07-30 | End: 2021-01-20

## 2020-07-30 RX ORDER — SODIUM CHLORIDE 0.9 % (FLUSH) 0.9 %
10 SYRINGE (ML) INJECTION EVERY 6 HOURS
Status: DISCONTINUED | OUTPATIENT
Start: 2020-07-30 | End: 2020-07-30 | Stop reason: HOSPADM

## 2020-07-30 RX ADMIN — HYDRALAZINE HYDROCHLORIDE 25 MG: 25 TABLET, FILM COATED ORAL at 06:07

## 2020-07-30 RX ADMIN — MORPHINE SULFATE 2 MG: 10 INJECTION INTRAVENOUS at 06:07

## 2020-07-30 RX ADMIN — OXYCODONE 5 MG: 5 TABLET ORAL at 10:07

## 2020-07-30 RX ADMIN — PIPERACILLIN SODIUM, TAZOBACTAM SODIUM 4.5 G: 4; .5 INJECTION, POWDER, LYOPHILIZED, FOR SOLUTION INTRAVENOUS at 06:07

## 2020-07-30 RX ADMIN — CLOPIDOGREL BISULFATE 75 MG: 75 TABLET, FILM COATED ORAL at 09:07

## 2020-07-30 RX ADMIN — HYDRALAZINE HYDROCHLORIDE 25 MG: 25 TABLET, FILM COATED ORAL at 01:07

## 2020-07-30 RX ADMIN — POTASSIUM & SODIUM PHOSPHATES POWDER PACK 280-160-250 MG 2 PACKET: 280-160-250 PACK at 06:07

## 2020-07-30 RX ADMIN — HEPARIN SODIUM 5000 UNITS: 5000 INJECTION INTRAVENOUS; SUBCUTANEOUS at 06:07

## 2020-07-30 RX ADMIN — ASPIRIN 81 MG 81 MG: 81 TABLET ORAL at 09:07

## 2020-07-30 RX ADMIN — AMIODARONE HYDROCHLORIDE 400 MG: 200 TABLET ORAL at 09:07

## 2020-07-30 RX ADMIN — ATORVASTATIN CALCIUM 80 MG: 40 TABLET, FILM COATED ORAL at 09:07

## 2020-07-30 RX ADMIN — PIPERACILLIN SODIUM, TAZOBACTAM SODIUM 4.5 G: 4; .5 INJECTION, POWDER, LYOPHILIZED, FOR SOLUTION INTRAVENOUS at 02:07

## 2020-07-30 RX ADMIN — Medication: at 10:07

## 2020-07-30 RX ADMIN — POTASSIUM & SODIUM PHOSPHATES POWDER PACK 280-160-250 MG 2 PACKET: 280-160-250 PACK at 09:07

## 2020-07-30 RX ADMIN — INSULIN ASPART 10 UNITS: 100 INJECTION, SOLUTION INTRAVENOUS; SUBCUTANEOUS at 01:07

## 2020-07-30 RX ADMIN — HEPARIN SODIUM 5000 UNITS: 5000 INJECTION INTRAVENOUS; SUBCUTANEOUS at 01:07

## 2020-07-30 RX ADMIN — PANTOPRAZOLE SODIUM 40 MG: 40 TABLET, DELAYED RELEASE ORAL at 09:07

## 2020-07-30 RX ADMIN — ERGOCALCIFEROL 50000 UNITS: 1.25 CAPSULE ORAL at 09:07

## 2020-07-30 RX ADMIN — GABAPENTIN 100 MG: 100 CAPSULE ORAL at 09:07

## 2020-07-30 RX ADMIN — CARVEDILOL 6.25 MG: 6.25 TABLET, FILM COATED ORAL at 09:07

## 2020-07-30 RX ADMIN — ALLOPURINOL 100 MG: 100 TABLET ORAL at 09:07

## 2020-07-30 RX ADMIN — GABAPENTIN 100 MG: 100 CAPSULE ORAL at 02:07

## 2020-07-30 NOTE — PROGRESS NOTES
[facility] Hospital Medicine  Progress Note    Patient Name: Suyapa Connelly  MRN: [id number]  Patient Class: IP- Inpatient   Admission Date: 7/17/2020  Length of Stay: 13 days  Attending Physician: Hitesh Mason MD  Primary Care Provider: Erlinda Rahman NP        Subjective:     Principal Problem:Acute osteomyelitis of left calcaneus        HPI:  Suyapa Connelly is a 54 y.o. female that (in part)  has a past medical history of Anxiety, Depression, Diabetes mellitus, GERD (gastroesophageal reflux disease), Hyperlipemia, Hypertension, and Myocardial infarction. Patient has a past surgical history that includes Angiogram - Right Extremity (Right, 7/9/15); angiogram-left leg (10/6/15); Catheterization of both left and right heart (N/A, 12/18/2019); Coronary angiography (N/A, 12/18/2019); Coronary Artery Bypass Graft (CABG) (N/A, 1/14/2020); and Insertion of tunneled central venous hemodialysis catheter (N/A, 1/27/2020). Presents to [facility] Emergency Department complaining of the left worsening heel foot wound. Poor wound healing.  Patient became concerned due to malodorous drainage.  Pain with ambulation and weight-bearing.  Associated hyperglycemia.  Denies fever chills.    In the emergency department physical exam concerning for osteomyelitis secondary to diabetic foot ulcer in the setting of uncontrolled diabetes.  Routine laboratory studies revealed hyperglycemia.  X-ray of the foot performed.  Concerning for osteomyelitis.  MRI confirmed this.  Podiatry was consulted.  Broad-spectrum antibiotics were initiated in the ED a special consideration given to risk of Pseudomonas infection.    Hospital medicine has been asked to admit to inpatient for further evaluation and treatment.     Overview/Hospital Course:  Suyapa Connelly is a 54 y.o. female that (in part)  has a past medical history of Anxiety, Depression, Diabetes mellitus, GERD (gastroesophageal reflux  disease), Hyperlipemia, Hypertension, and Myocardial infarction.  has a past surgical history that includes Angiogram - Right Extremity (Right, 7/9/15); angiogram-left leg (10/6/15); Catheterization of both left and right heart (N/A, 12/18/2019); Coronary angiography (N/A, 12/18/2019); Coronary Artery Bypass Graft (CABG) (N/A, 1/14/2020); and Insertion of tunneled central venous hemodialysis catheter (N/A, 1/27/2020). Presents to [facility] Emergency Department complaining of the left heel foot wound.  MRI confirmed osteo. Podiatry, ID, and vascular were consulted.  Podiatry discussed options with patient. She has chosen 6 weeks of IV abx. ID has been consulted to tailor abx. She has been continued on Aztreonam.  Blood cultures were NG. Bone bx on 7/18 has grown Enterococcus faecalis, Grp B Strep, and Enterobacter cloacae. Vascular Surgery consulted and did an angiogram which revealed adequate artery for access the left SFA and AK popliteal artery occlusion.  Patient with renal failure and Nephrology consulted.  Renal function continues to improve and ok to proceed with vascular intervention.  Podiatry planning debridement post vascular procedure.  Creatinine peaked at 2.4 and is currently 1.2. patient had a stress test on 7/27 that showed reversible defects in anterior, inferior and lateral walls. Podiatry signed off. ID consulted to make final ID recs for home.  Patient had an abnormal stress test and went for Angio on 7/28 with stent to Cx. Cards continued to follow the patient.  Nephrology followed for renal failure. Patient will be discharged to home with 6 weeks of Zosyn. Out patient wound care. Follow up with vascular as well. Diet- low NA, ADA 2000 arya diet.       Interval History: requesting discharge.       Review of Systems   Constitutional: Negative for activity change.   HENT: Negative for congestion.    Respiratory: Negative for chest tightness and shortness of breath.     Cardiovascular: Negative for chest pain.   Gastrointestinal: Negative for abdominal pain.   Genitourinary: Negative for difficulty urinating.   Musculoskeletal: Negative for arthralgias.   Psychiatric/Behavioral: Negative for agitation and behavioral problems.     Objective:     Vital Signs (Most Recent):  Temp: 98.3 °F (36.8 °C) (07/30/20 0746)  Pulse: 92 (07/30/20 0746)  Resp: 18 (07/30/20 1001)  BP: (!) 151/74 (07/30/20 0746)  SpO2: 99 % (07/30/20 0746) Vital Signs (24h Range):  Temp:  [98.1 °F (36.7 °C)-98.5 °F (36.9 °C)] 98.3 °F (36.8 °C)  Pulse:  [83-96] 92  Resp:  [16-20] 18  SpO2:  [99 %-100 %] 99 %  BP: (123-156)/(60-76) 151/74     Weight: 90.5 kg (199 lb 8.3 oz)  Body mass index is 33.2 kg/m².    Intake/Output Summary (Last 24 hours) at 7/30/2020 1002  Last data filed at 7/30/2020 0500  Gross per 24 hour   Intake --   Output 1450 ml   Net -1450 ml      Physical Exam  Vitals signs and nursing note reviewed.   Constitutional:       General: She is not in acute distress.     Appearance: Normal appearance. She is obese. She is not ill-appearing, toxic-appearing or diaphoretic.   HENT:      Head: Normocephalic and atraumatic.   Pulmonary:      Effort: No respiratory distress.   Neurological:      Mental Status: She is alert and oriented to person, place, and time.   Psychiatric:         Mood and Affect: Mood normal.         Behavior: Behavior normal.         Significant Labs:   BMP:   Recent Labs   Lab 07/30/20  0417   *      K 4.4      CO2 23   BUN 30*   CREATININE 1.8*   CALCIUM 8.2*     CBC:   Recent Labs   Lab 07/29/20  0008   WBC 13.03*   HGB 10.0*   HCT 31.7*   *       Significant Imaging:      Assessment/Plan:      * Acute osteomyelitis of left calcaneus  - MRI confirms osteo. Bone Bx- 7/18  - Afebrile and with resolved leukocytosis   - Wound cultures with Enterococcus, E cloacae, and GBS.   - ID consulted for ABx recommendations and will d/c axtreoman and start cefepime 2g  q12h; continue vancomycin   - Vascular Surgery consulted and planning angiogram 7/24/2020.  - Patient declines amputation and wants to try conservative tx with  IV ABx's.   - CKD and Nephrology consulted.  Vascular discussed case with Nephrology and patient cleared from renal for vascular intervention.    - Podiatry planning debridement, if she remains 7/31 they will come assess for any procedure.  - Vascular surgery following with workup for revascularization procedure that she will need for healing. Vein mapping and Angiogram complete, uncertain timing for revascularization procedure  -stress test today to complete cardiac workup-     Multiple wall motion reversible defects.     Will follow ID recs for discharge.     Zosyn for 6 weeks           Preop cardiovascular exam  Stent placed to Cx on 7/28.         Hyponatremia  - resolved  - Monitor with daily labs      Stage 3 chronic kidney disease  Appreciate Nephrology input.  Patient did have acute on chronic CKD 3 which has now resolved  Hyperkalemia on 7/21.  Treated with Calcium, Albuterol and Kayexalate.  Resolved     With some acute renal failure today- likely pre-renal from elevated glucose     Leveled off CRT. Will d/c to home. Can have follow up BMP on Monday with H/H           Non healing left heel wound  Continue current wound care  Provide adequate analgesia prior to daily wound care      S/P CABG x 1  No acute issues     -stress test today for cardiac clearance    Mixed hyperlipidemia  Stable      Chronic combined systolic and diastolic heart failure  No acute issues   EF 50% on recent echo         Essential hypertension  Continue current management.  Adequate control      Type 2 diabetes mellitus without complication, with long-term current use of insulin  Uncontrolled with hyperglycemia. Manifestations likely of CKD 3      - Will check an A1c- > 14.  - Glucose increased secondary to steroids.  - Added insulin with meals and will increase prandial  insulin to 8U and increased basal to 15U for better control of blood glucose   -improved control    Uncontrolled sugars.  Increased insulin today              Peripheral artery disease  Patient requires revascularization of the left lower extremity. Angiogram completed 7/24 with bilateral occlusive disease. Vascular surgery following and will need revascularization    Vascular following. They will contact patient once discharged.           VTE Risk Mitigation (From admission, onward)         Ordered     heparin (porcine) injection 5,000 Units  Every 8 hours      07/17/20 2217     IP VTE HIGH RISK PATIENT  Once      07/17/20 2217     Place sequential compression device  Until discontinued      07/17/20 2217     Place HAILEY hose  Until discontinued      07/17/20 2217                Will d/c to home with H/H        Hitesh Barrow MD  Department of Hospital Medicine   [facility]

## 2020-07-30 NOTE — PLAN OF CARE
Pt seen and examined this morning.  Recommend LLE bypass outpatient for limb salvage.  Will contact patient with preop scheduling.  Cont wound care, glycemic control, nutrition optimization, Abx and offloading.

## 2020-07-30 NOTE — ASSESSMENT & PLAN NOTE
Appreciate Nephrology input.  Patient did have acute on chronic CKD 3 which has now resolved  Hyperkalemia on 7/21.  Treated with Calcium, Albuterol and Kayexalate.  Resolved     With some acute renal failure today- likely pre-renal from elevated glucose     Leveled off CRT. Will d/c to home. Can have follow up BMP on Monday with H/H

## 2020-07-30 NOTE — PLAN OF CARE
Problem: Wound  Goal: Optimal Wound Healing  Intervention: Promote Effective Wound Healing  Flowsheets (Taken 7/30/2020 0643)  Sleep/Rest Enhancement:   regular sleep/rest pattern promoted   relaxation techniques promoted  Pain Management Interventions: care clustered     Problem: Fall Injury Risk  Goal: Absence of Fall and Fall-Related Injury  Intervention: Identify and Manage Contributors to Fall Injury Risk  Flowsheets (Taken 7/30/2020 0424)  Self-Care Promotion:   independence encouraged   BADL personal objects within reach   BADL personal routines maintained  Medication Review/Management: medications reviewed  Intervention: Promote Injury-Free Environment  Flowsheets (Taken 7/30/2020 0424)  Safety Promotion/Fall Prevention:   assistive device/personal item within reach   instructed to call staff for mobility   room near unit station   Fall Risk reviewed with patient/family   commode/urinal/bedpan at bedside  Environmental Safety Modification: assistive device/personal items within reach     Problem: Adult Inpatient Plan of Care  Goal: Plan of Care Review  Flowsheets (Taken 7/30/2020 0424)  Plan of Care Reviewed With: patient     Problem: Diabetes Comorbidity  Goal: Blood Glucose Level Within Desired Range  Intervention: Maintain Glycemic Control  Flowsheets (Taken 7/30/2020 0424)  Glycemic Management: blood glucose monitoring

## 2020-07-30 NOTE — NURSING
Prn oxycodone given before wound care. Wound care completed per orders - pt tolerated well. (wound uncovered and assessed by MD on morning rounds)

## 2020-07-30 NOTE — PLAN OF CARE
07/30/20 1102   Post-Acute Status   Post-Acute Authorization Other  (Home)   Home Health Status Discharge Plan Changed   Medication Status Set-up Complete   Other Status   (Appts scheduled)   Discharge Delays None known at this time   Discharge Plan   Discharge Plan A Home with family;Other  ([facility])     Orders received and faxed to [facility] via [facility]. EDUARD has been in contact with Agustin with [facility], and he is aware of the orders.     EDUARD contacted pt's nurse, Leona, to determine pt's line. According to nurse, pt has a midline and has been there since the 18 th of the month. Pt will need iv abx for 6 weeks and unable to d/c with midline. EDUARD also explained pt may need dialysis in the future according to notes and may not be a candidate for a picc. According to DEANGELO Delgadillo, she will address concerns with MD.     11:17am  EDUARD contacted wound care center at [phone] to arrange follow-up appointment. EDUARD spoke with Earnestine to schedule wound care follow-up. According to Earnestine, she will contact podiatry to determine who to schedule appointment with.     3:30pm  Teaching completed by Agustin with [facility]. According to Agustin, pt is cleared from their standpoint.     EDUARD notified pt's nurse, Leona, all CM needs have been met.

## 2020-07-30 NOTE — PROGRESS NOTES
[facility]  Nephrology  Progress Note    Patient Name: Suyapa Connelly  MRN: [id number]  Admission Date: 7/17/2020  Hospital Length of Stay: 13 days  Attending Provider: Hitesh Mason MD   Primary Care Physician: Erlinda Rahman NP  Principal Problem:Acute osteomyelitis of left calcaneus  Date of service 7/30/2020  Consults Reason for consult: ROSLYN on CKD  Subjective:     Interval History: No acute overnight events. UOP 1.5L/ 24 hrs. Today renal function is stable. Plans for outpatient bypass surgery noted. PT feeling well, no complaints. Discussed PICC placement with RN    Review of patient's allergies indicates:   Allergen Reactions    Ciprofloxacin      Itchiness    Contrast media      Kidney injury    Pcn [penicillins]      Rash; tolerated ceftriaxone on 1/13/20     Current Facility-Administered Medications   Medication Frequency    0.9%  NaCl infusion Continuous    acetaminophen tablet 650 mg Q8H PRN    acetaminophen tablet 650 mg Q4H PRN    allopurinoL tablet 100 mg Daily    ALPRAZolam tablet 0.5 mg BID PRN    amiodarone tablet 400 mg Daily    aspirin chewable tablet 81 mg Daily    atorvastatin tablet 80 mg Daily    cadexomer iodine 0.9 % gel Daily    carvediloL tablet 6.25 mg Daily    clopidogreL tablet 75 mg Daily    dextrose 50% injection 12.5 g PRN    ergocalciferol capsule 50,000 Units Q7 Days    gabapentin capsule 100 mg TID    glucagon (human recombinant) injection 1 mg PRN    heparin (porcine) injection 5,000 Units Q8H    hydrALAZINE injection 10 mg Once    hydrALAZINE tablet 25 mg Q8H    insulin aspart U-100 pen 0-5 Units Q6H PRN    insulin aspart U-100 pen 10 Units TID WM    insulin detemir U-100 pen 23 Units Daily    melatonin tablet 6 mg Nightly PRN    morphine injection 2 mg Q4H PRN    morphine injection 3 mg Q1H PRN    morphine injection PRN    ondansetron injection 4 mg Q12H PRN    ondansetron injection 8 mg Q8H PRN    oxyCODONE immediate  release tablet 5 mg Q4H PRN    pantoprazole EC tablet 40 mg Daily    piperacillin-tazobactam 4.5 g in sodium chloride 0.9% 100 mL IVPB (ready to mix system) Q8H    polyethylene glycol packet 17 g BID PRN    potassium, sodium phosphates 280-160-250 mg packet 2 packet Once    potassium, sodium phosphates 280-160-250 mg packet 2 packet Q2H    sodium chloride 0.9% flush 10 mL PRN       Objective:     Vital Signs (Most Recent):  Temp: 98.3 °F (36.8 °C) (07/30/20 0746)  Pulse: 92 (07/30/20 0746)  Resp: 16 (07/30/20 0746)  BP: (!) 151/74 (07/30/20 0746)  SpO2: 99 % (07/30/20 0746)  O2 Device (Oxygen Therapy): room air (07/30/20 0618) Vital Signs (24h Range):  Temp:  [98.1 °F (36.7 °C)-98.5 °F (36.9 °C)] 98.3 °F (36.8 °C)  Pulse:  [83-96] 92  Resp:  [16-20] 16  SpO2:  [99 %-100 %] 99 %  BP: (123-156)/(60-76) 151/74     Weight: 90.5 kg (199 lb 8.3 oz) (07/29/20 0334)  Body mass index is 33.2 kg/m².  Body surface area is 2.04 meters squared.    I/O last 3 completed shifts:  In: -   Out: 3760 [Urine:3760]    Physical Exam  Vitals signs and nursing note reviewed.   Constitutional:       General: She is not in acute distress.     Appearance: Normal appearance. She is well-developed. She is not ill-appearing or diaphoretic.   HENT:      Head: Normocephalic and atraumatic.   Eyes:      General: No scleral icterus.        Right eye: No discharge.         Left eye: No discharge.   Cardiovascular:      Rate and Rhythm: Normal rate and regular rhythm.      Heart sounds: Normal heart sounds. No murmur. No friction rub.   Pulmonary:      Effort: Pulmonary effort is normal. No respiratory distress.      Breath sounds: No wheezing or rales.   Abdominal:      General: There is no distension.      Palpations: Abdomen is soft. There is no mass.      Tenderness: There is no abdominal tenderness.   Musculoskeletal:         General: Swelling (trace- mild pitting edema to BLE) present. No deformity.      Right lower leg: No edema (mild  pitting BLE).      Left lower leg: No edema.   Skin:     General: Skin is warm and dry.      Findings: No rash.      Comments: L foot wound with bandages   Neurological:      Mental Status: She is alert and oriented to person, place, and time.   Psychiatric:         Mood and Affect: Mood normal.         Behavior: Behavior normal.         Thought Content: Thought content normal.         Significant Labs:   CBC:   Recent Labs   Lab 07/29/20  0008   WBC 13.03*   RBC 3.30*   HGB 10.0*   HCT 31.7*   *   MCV 96   MCH 30.3   MCHC 31.5*     CMP:   Recent Labs   Lab 07/29/20  1718 07/30/20  0417   * 158*   CALCIUM 8.6* 8.2*   ALBUMIN 2.3*  --    * 136   K 4.3 4.4   CO2 24 23    108   BUN 31* 30*   CREATININE 1.8* 1.8*     Microbiology Results (last 7 days)     ** No results found for the last 168 hours. **        Recent Labs   Lab 07/29/20  0040   COLORU Yellow   SPECGRAV >1.030*   PHUR 5.0   PROTEINUA 3+*   BACTERIA None   NITRITE Negative   LEUKOCYTESUR Trace*   UROBILINOGEN Negative   HYALINECASTS 0     All labs within the past 24 hours have been reviewed.    Significant Imaging:  X-Ray: Reviewed    Assessment/Plan:     ROSLYN on CKD 3   - Has hx of HD dependant ROSLYN (LEVI) requiring RRT from 1/16/20 -3/10/20  - Recent baseline Crappears to be 1.4 -1.9 (GFR 30-43%); suspect underlying diabetic nephropathy Hgb A 1c > 14  - s/p IV contrast 7/28, renal function initially worsened but stable today; would be reasonable to discharge with close outpatient nephrology follow up.  - consult pharmacy to mix all IVPB in NS instead of D5W when able - recommend continuing this outpatient  - HOLD further IVF, will also hold lasix for today  - caution with IVF in advanced CKD patients - monitor O2 closely  - Repeat UA looks like ROSLYN; no yeast, but treated empirically with diflucan 150 mg PO x1 (no renal adjustment necessary for single dose)  - renally dose meds for current GFR/ avoid nephrotoxic meds  - strict Is/  Os/ daily weights  - daily labs while admitted     Hypervolemia   - continue to hold IVF, monitor I&Os, daily weights  - mildly hypervolemic  - low salt diet     Metabolic Acidosis   - Bicarb trending up today  - no need for replacement  - d/t ROSLYN + CKD  - AM labs     CKD MBD  - PTH 78 and Vit D 7, phos trending down.   - phos wnl today; replace PRN  - continue ergo   - labs in AM     Hypophosphatemia   - Phos has been intermittently low  - suspect d/t frequent NPO status  - 2 packets x 2 doses last night, repeat today     Hypomagnesemia  - Mg trending down   - Replace today  - Monitor      Proteinuria   - UPC 2.5 g   - would benefit fron ACE/ ARB once ROSLYN resolves & procedures are complete   - repeat UA evidence ROSLYN ---- recommend repeating outpatient     Hyperuricemia   - continue allopurinol 100 mg PO QD    Acute osteomyelitis of L foot, non-healing wound  CAD s/p CABG  DM  PAD    Thank you for allowing me to participate in the care of your patient.  Please call with any questions.    Date of service: 7/30/2020  Rose Coker PA-C  Nephrology  [facility]  Office [phone]   [facility]

## 2020-07-30 NOTE — NURSING
Patient escorted by transport to family vehicle for discharge home. Patient accompanied by mother. No apparent distress noted.

## 2020-07-30 NOTE — ASSESSMENT & PLAN NOTE
54F with h/o CAD/PAD with recent cardiac stent with plans for outpatient revascularization of lower extremity in next few weeks. Patient with osteomyelitis of L foot based on positive bone cultures growing e.faecalis, group B strep and enterobacter. Note path with viable bone without inflammatory infiltrates or other significant histopathologic abnormalities. Pt has a labeled penicillin allergy (childhood), which I don't think is a real allergy because she tolerates zosyn and cephalosporins    Recommendations:   - zosyn x 6 weeks for osteo  - please arrange home health  -  Ultimately needs adequate blood flow to heal wound and sounds like there are outpatient plans for revascularization.     Infection: osteomyelitis L foot    Discharge antibiotics:   Zosyn continuous infusion    End date of IV antibiotics: 8/28/20    Weekly outpatient laboratory on Monday or Tuesday while on IV antibiotics.    CBC   CMP   ESR and CRP    Fax laboratory results to [facility] at [phone] with attn: Dr De Anda    Outpatient Infectious Diseases clinic follow up will be arranged and found in patient calendar.    Prior to discharge, please ensure the patient's follow-up has been scheduled.  If there is still no follow-up scheduled in Infectious Diseases clinic, please send an EPIC message to Adela Kapoor LPN in Infectious Diseases.

## 2020-07-30 NOTE — PROCEDURES
"Suyapa Connelly is a 54 y.o. female patient.    Temp: 97.6 °F (36.4 °C) (07/30/20 1155)  Pulse: 87 (07/30/20 1155)  Resp: 16 (07/30/20 1155)  BP: 121/67 (07/30/20 1155)  SpO2: 97 % (07/30/20 1223)  Weight: 90.5 kg (199 lb 8.3 oz) (07/29/20 0334)  Height: 5' 5" (165.1 cm) (07/24/20 1553)    PICC  Date/Time: 7/30/2020 3:05 PM  Performed by: Yariel Aburto RN  Consent Done: Yes  Time out: Immediately prior to procedure a time out was called to verify the correct patient, procedure, equipment, support staff and site/side marked as required  Indications: med administration  Anesthesia: local infiltration  Local anesthetic: lidocaine 1% without epinephrine  Anesthetic Total (mL): 1  Preparation: skin prepped with ChloraPrep  Skin prep agent dried: skin prep agent completely dried prior to procedure  Sterile barriers: all five maximum sterile barriers used - cap, mask, sterile gown, sterile gloves, and large sterile sheet  Hand hygiene: hand hygiene performed prior to central venous catheter insertion  Location details: right basilic  Catheter type: double lumen  Catheter size: 5 Fr  Catheter Length: 41cm    Vascular probe with ultrasound: Over wire technique used.  Number of attempts: 1  Post-procedure: blood return through all ports, chlorhexidine patch and sterile dressing applied  Estimated blood loss (mL): 0            Yariel Aburto  7/30/2020    "

## 2020-07-30 NOTE — ASSESSMENT & PLAN NOTE
Patient requires revascularization of the left lower extremity. Angiogram completed 7/24 with bilateral occlusive disease. Vascular surgery following and will need revascularization    Vascular following. They will contact patient once discharged.

## 2020-07-30 NOTE — CONSULTS
[facility]  Infectious Disease  Consult Note    Patient Name: Suyapa Connelly  MRN: [id number]  Admission Date: 7/17/2020  Hospital Length of Stay: 13 days  Attending Physician: Hitesh Mason MD  Primary Care Provider: Erlinda Rahman NP     Isolation Status: No active isolations    Patient information was obtained from patient and ER records.      Consults  Assessment/Plan:     * Acute osteomyelitis of left calcaneus  54F with h/o CAD/PAD with recent cardiac stent with plans for outpatient revascularization of lower extremity in next few weeks. Patient with osteomyelitis of L foot based on positive bone cultures growing e.faecalis, group B strep and enterobacter. Note path with viable bone without inflammatory infiltrates or other significant histopathologic abnormalities. Pt has a labeled penicillin allergy (childhood), which I don't think is a real allergy because she tolerates zosyn and cephalosporins    Recommendations:   - zosyn x 6 weeks for osteo  - please arrange home health  -  Ultimately needs adequate blood flow to heal wound and sounds like there are outpatient plans for revascularization.     Infection: osteomyelitis L foot    Discharge antibiotics:   Zosyn continuous infusion    End date of IV antibiotics: 8/28/20    Weekly outpatient laboratory on Monday or Tuesday while on IV antibiotics.    CBC   CMP   ESR and CRP    Fax laboratory results to [facility] at [phone] with attn: Dr De Anda    Outpatient Infectious Diseases clinic follow up will be arranged and found in patient calendar.    Prior to discharge, please ensure the patient's follow-up has been scheduled.  If there is still no follow-up scheduled in Infectious Diseases clinic, please send an EPIC message to Adela Kapoor LPN in Infectious Diseases.                  Thank you for your consult. I will sign off. Please contact us if you have any additional questions.    Lindsay De Anda MD  Infectious  Disease  [facility]    Subjective:     Principal Problem: Acute osteomyelitis of left calcaneus    HPI: A 54-year-old woman with CAD, HF-EF 35%, PAD, s/p bilateral lower extremity stents, DM 2,  Anxiety, depression, and chronic open wound of the left heel who developed malodorous drainage from her wound. This was associated with pain and hyperglycemia. She was evaluated on  ED where she was afebrile and without leukocytosis. Further work up revealed elevated creatinine level, an ESR of 139, and CRP of 9.2. MRI showed evidence of osteomyelitis of the calcaneous. She was evaluated by Podiatry and subsequently underwent bone biopsy for pathology and culture. Cultures with Enterococcus pending identification and sensitivities.     Mrs. Connelly has allergy to penicillin, Bactrim, and clindamycin. She has a pet dog. She smoke marijuana occasionally. She enjoys fishing.     Infectious Diseases consulted for management recommendations.       Interval History: JUANO. Has been on zosyn x 24 hours without any reaction. Denies itching, rash, swelling,etc.    Review of Systems   Constitutional: Negative for activity change.   HENT: Negative for congestion.    Respiratory: Negative for chest tightness and shortness of breath.    Cardiovascular: Negative for chest pain.   Gastrointestinal: Negative for abdominal pain.   Genitourinary: Negative for difficulty urinating.   Musculoskeletal: Negative for arthralgias.   Psychiatric/Behavioral: Negative for agitation and behavioral problems.     Objective:     Vital Signs (Most Recent):  Temp: 97.6 °F (36.4 °C) (07/30/20 1155)  Pulse: 87 (07/30/20 1155)  Resp: 16 (07/30/20 1155)  BP: 121/67 (07/30/20 1155)  SpO2: 97 % (07/30/20 1223) Vital Signs (24h Range):  Temp:  [97.6 °F (36.4 °C)-98.5 °F (36.9 °C)] 97.6 °F (36.4 °C)  Pulse:  [83-96] 87  Resp:  [16-20] 16  SpO2:  [97 %-100 %] 97 %  BP: (121-156)/(60-76) 121/67     Weight: 90.5 kg (199 lb 8.3 oz)  Body mass index  is 33.2 kg/m².    Intake/Output Summary (Last 24 hours) at 7/30/2020 1251  Last data filed at 7/30/2020 0500  Gross per 24 hour   Intake --   Output 1450 ml   Net -1450 ml      Physical Exam  Vitals signs and nursing note reviewed.   Constitutional:       General: She is not in acute distress.     Appearance: Normal appearance. She is obese. She is not ill-appearing, toxic-appearing or diaphoretic.   HENT:      Head: Normocephalic and atraumatic.   Cardiovascular:      Rate and Rhythm: Normal rate and regular rhythm.      Comments: cabg scar  Pulmonary:      Effort: Pulmonary effort is normal. No respiratory distress.      Breath sounds: Normal breath sounds.   Musculoskeletal:      Comments: LLE bandaged. Dressings c/d/i   Skin:     Comments: picc line with some serosanguinous drainage on biopatch. One lumen   Neurological:      General: No focal deficit present.      Mental Status: She is alert and oriented to person, place, and time.   Psychiatric:         Mood and Affect: Mood normal.         Behavior: Behavior normal.         Significant Labs:   BMP:   Recent Labs   Lab 07/30/20  0417   *      K 4.4      CO2 23   BUN 30*   CREATININE 1.8*   CALCIUM 8.2*     CBC:   Recent Labs   Lab 07/29/20  0008   WBC 13.03*   HGB 10.0*   HCT 31.7*   *       Significant Imaging:

## 2020-07-30 NOTE — PROGRESS NOTES
WRITTEN HEALTHCARE DISCHARGE INFORMATION     Things that YOU are RESPONSIBLE for to Manage Your Care At Home:    1. Getting your prescriptions filled.  2. Taking you medications as directed. DO NOT MISS ANY DOSES!  3. Going to your follow-up doctor appointments. This is important because it allows the doctor to monitor your progress and to determine if any changes need to be made to your treatment plan.    If you are unable to make your follow up appointments, please call the number listed and reschedule this appointment.     ____________HELP AT HOME____________________    Experiencing any SIGNS or SYMPTOMS: YOU CAN    Schedule a same day appopintment with your Primary Care Doctor or  you can call [facility] On Call Nurse Care Line for 24/7 assistance at [phone]    If you are experience any signs or symptoms that have become severe, Call 911 and come to your nearest Emergency Room.    Thank you for choosing [facility] and allowing us to care for you.   From your care management team: Suad CARTER [facility] [phone]    You should receive a call from [facility] within 48-72 hours to help manage your care after discharge. Please try to make sure that you answer your phone for this important phone call.  Follow-up Information     Rachel Easley MD On 9/28/2020.    Specialties: Cardiology, INTERVENTIONAL CARDIOLOGY  Why: Outpatient Services, Cardiology, follow-up appointment at 9:00am. Patient has been added to waitlist.   Contact information:  [address]  [address]  [place] [address]  [phone]             [facility] On 8/7/2020.    Specialty: Wound Care  Why: Wound care appointment at 1:00pm.   Contact information:  [address]  [facility] [address] [phone]           [facility].    Specialty: Infusion Provider  Why: Infusion  Contact information:  [address]  [address]  [place] [address]  [phone]             Schedule an  appointment as soon as possible for a visit with Kaden Ortiz MD.    Specialty: Nephrology  Why: Please schedule appt.   Contact information:  [address] [place] [address] [address]  [place] [address] [phone]

## 2020-07-30 NOTE — SUBJECTIVE & OBJECTIVE
Interval History: requesting discharge.       Review of Systems   Constitutional: Negative for activity change.   HENT: Negative for congestion.    Respiratory: Negative for chest tightness and shortness of breath.    Cardiovascular: Negative for chest pain.   Gastrointestinal: Negative for abdominal pain.   Genitourinary: Negative for difficulty urinating.   Musculoskeletal: Negative for arthralgias.   Psychiatric/Behavioral: Negative for agitation and behavioral problems.     Objective:     Vital Signs (Most Recent):  Temp: 98.3 °F (36.8 °C) (07/30/20 0746)  Pulse: 92 (07/30/20 0746)  Resp: 18 (07/30/20 1001)  BP: (!) 151/74 (07/30/20 0746)  SpO2: 99 % (07/30/20 0746) Vital Signs (24h Range):  Temp:  [98.1 °F (36.7 °C)-98.5 °F (36.9 °C)] 98.3 °F (36.8 °C)  Pulse:  [83-96] 92  Resp:  [16-20] 18  SpO2:  [99 %-100 %] 99 %  BP: (123-156)/(60-76) 151/74     Weight: 90.5 kg (199 lb 8.3 oz)  Body mass index is 33.2 kg/m².    Intake/Output Summary (Last 24 hours) at 7/30/2020 1002  Last data filed at 7/30/2020 0500  Gross per 24 hour   Intake --   Output 1450 ml   Net -1450 ml      Physical Exam  Vitals signs and nursing note reviewed.   Constitutional:       General: She is not in acute distress.     Appearance: Normal appearance. She is obese. She is not ill-appearing, toxic-appearing or diaphoretic.   HENT:      Head: Normocephalic and atraumatic.   Pulmonary:      Effort: No respiratory distress.   Neurological:      Mental Status: She is alert and oriented to person, place, and time.   Psychiatric:         Mood and Affect: Mood normal.         Behavior: Behavior normal.         Significant Labs:   BMP:   Recent Labs   Lab 07/30/20  0417   *      K 4.4      CO2 23   BUN 30*   CREATININE 1.8*   CALCIUM 8.2*     CBC:   Recent Labs   Lab 07/29/20  0008   WBC 13.03*   HGB 10.0*   HCT 31.7*   *       Significant Imaging:

## 2020-07-30 NOTE — NURSING
Report given to receiving nurse Leona. Pt awake in bed. Walking rounds completed. Pt assessed, NAD noted.     24hr chart check completed.

## 2020-07-30 NOTE — PLAN OF CARE
Problem: Fall Injury Risk  Goal: Absence of Fall and Fall-Related Injury  Intervention: Identify and Manage Contributors to Fall Injury Risk  Flowsheets (Taken 7/30/2020 0424)  Self-Care Promotion:   independence encouraged   BADL personal objects within reach   BADL personal routines maintained  Medication Review/Management: medications reviewed  Intervention: Promote Injury-Free Environment  Flowsheets (Taken 7/30/2020 0424)  Safety Promotion/Fall Prevention:   assistive device/personal item within reach   instructed to call staff for mobility   room near unit station   Fall Risk reviewed with patient/family   commode/urinal/bedpan at bedside  Environmental Safety Modification: assistive device/personal items within reach     Problem: Adult Inpatient Plan of Care  Goal: Plan of Care Review  Flowsheets (Taken 7/30/2020 0424)  Plan of Care Reviewed With: patient     Problem: Diabetes Comorbidity  Goal: Blood Glucose Level Within Desired Range  Intervention: Maintain Glycemic Control  Flowsheets (Taken 7/30/2020 0424)  Glycemic Management: blood glucose monitoring

## 2020-07-30 NOTE — PLAN OF CARE
07/30/20 1611   Final Note   Assessment Type Final Discharge Note   Anticipated Discharge Disposition Home  (With iv abx)   What phone number can be called within the next 1-3 days to see how you are doing after discharge? [phone]   Hospital Follow Up  Appt(s) scheduled? Yes   Discharge plans and expectations educations in teach back method with documentation complete? Yes   Right Care Referral Info   Post Acute Recommendation Other  (Home with iv abx)   Referral Type Home Infusion   Facility Name [facility]   Post-Acute Status   Post-Acute Authorization Medications   Home Health Status Discharge Plan Changed   Medication Status Set-up Complete   Other Status No Post-Acute Service Needs   Discharge Delays None known at this time

## 2020-07-30 NOTE — SUBJECTIVE & OBJECTIVE
Medications:  Continuous Infusions:   sodium chloride 0.9% 75 mL/hr at 07/28/20 0645     Scheduled Meds:   allopurinoL  100 mg Oral Daily    amiodarone  400 mg Oral Daily    aspirin  81 mg Oral Daily    atorvastatin  80 mg Oral Daily    cadexomer iodine   Topical (Top) Daily    carvediloL  6.25 mg Oral Daily    clopidogreL  75 mg Oral Daily    ergocalciferol  50,000 Units Oral Q7 Days    gabapentin  100 mg Oral TID    heparin (porcine)  5,000 Units Subcutaneous Q8H    hydrALAZINE  10 mg Intravenous Once    hydrALAZINE  25 mg Oral Q8H    insulin aspart U-100  10 Units Subcutaneous TID WM    insulin detemir U-100  23 Units Subcutaneous Daily    pantoprazole  40 mg Oral Daily    piperacillin-tazobactam (ZOSYN) IVPB  4.5 g Intravenous Q8H    potassium, sodium phosphates  2 packet Oral Once    sodium chloride 0.9%  10 mL Intravenous Q6H     PRN Meds:acetaminophen, acetaminophen, ALPRAZolam, dextrose 50%, glucagon (human recombinant), insulin aspart U-100, melatonin, morphine, morphine, morphine, ondansetron, ondansetron, oxyCODONE, polyethylene glycol, sodium chloride 0.9%, Flushing PICC Protocol **AND** sodium chloride 0.9% **AND** sodium chloride 0.9%     Objective:     Vital Signs (Most Recent):  Temp: 98.5 °F (36.9 °C) (07/30/20 1630)  Pulse: 82 (07/30/20 1630)  Resp: 16 (07/30/20 1630)  BP: 135/69 (07/30/20 1630)  SpO2: 98 % (07/30/20 1630) Vital Signs (24h Range):  Temp:  [97.6 °F (36.4 °C)-98.5 °F (36.9 °C)] 98.5 °F (36.9 °C)  Pulse:  [82-96] 82  Resp:  [16-20] 16  SpO2:  [97 %-100 %] 98 %  BP: (121-156)/(62-76) 135/69         Physical Exam  Vitals signs reviewed.   Constitutional:       General: She is not in acute distress.     Appearance: She is well-developed. She is not diaphoretic.   HENT:      Head: Normocephalic and atraumatic.   Eyes:      Conjunctiva/sclera: Conjunctivae normal.   Neck:      Musculoskeletal: Neck supple.   Cardiovascular:      Rate and Rhythm: Normal rate.       Pulses:           Femoral pulses are 2+ on the right side and 2+ on the left side.       Popliteal pulses are 2+ on the right side and 2+ on the left side.        Dorsalis pedis pulses are detected w/ Doppler on the right side and detected w/ Doppler on the left side.        Posterior tibial pulses are detected w/ Doppler on the right side and detected w/ Doppler on the left side.   Pulmonary:      Effort: Pulmonary effort is normal.   Abdominal:      General: There is no distension.      Palpations: Abdomen is soft. There is no mass.      Tenderness: There is no abdominal tenderness. There is no guarding or rebound.      Hernia: No hernia is present.   Musculoskeletal: Normal range of motion.         General: No deformity.   Feet:      Left foot:      Skin integrity: Ulcer and skin breakdown present.   Skin:     General: Skin is warm and dry.      Findings: No rash.   Neurological:      General: No focal deficit present.      Mental Status: She is alert and oriented to person, place, and time.   Psychiatric:         Mood and Affect: Mood normal.         Significant Labs:  All pertinent labs from the last 24 hours have been reviewed.    Significant Diagnostics:  I have reviewed all pertinent imaging results/findings within the past 24 hours.

## 2020-07-30 NOTE — SUBJECTIVE & OBJECTIVE
Interval History: KIRSTEN. Has been on zosyn x 24 hours without any reaction. Denies itching, rash, swelling,etc.    Review of Systems   Constitutional: Negative for activity change.   HENT: Negative for congestion.    Respiratory: Negative for chest tightness and shortness of breath.    Cardiovascular: Negative for chest pain.   Gastrointestinal: Negative for abdominal pain.   Genitourinary: Negative for difficulty urinating.   Musculoskeletal: Negative for arthralgias.   Psychiatric/Behavioral: Negative for agitation and behavioral problems.     Objective:     Vital Signs (Most Recent):  Temp: 97.6 °F (36.4 °C) (07/30/20 1155)  Pulse: 87 (07/30/20 1155)  Resp: 16 (07/30/20 1155)  BP: 121/67 (07/30/20 1155)  SpO2: 97 % (07/30/20 1223) Vital Signs (24h Range):  Temp:  [97.6 °F (36.4 °C)-98.5 °F (36.9 °C)] 97.6 °F (36.4 °C)  Pulse:  [83-96] 87  Resp:  [16-20] 16  SpO2:  [97 %-100 %] 97 %  BP: (121-156)/(60-76) 121/67     Weight: 90.5 kg (199 lb 8.3 oz)  Body mass index is 33.2 kg/m².    Intake/Output Summary (Last 24 hours) at 7/30/2020 1251  Last data filed at 7/30/2020 0500  Gross per 24 hour   Intake --   Output 1450 ml   Net -1450 ml      Physical Exam  Vitals signs and nursing note reviewed.   Constitutional:       General: She is not in acute distress.     Appearance: Normal appearance. She is obese. She is not ill-appearing, toxic-appearing or diaphoretic.   HENT:      Head: Normocephalic and atraumatic.   Cardiovascular:      Rate and Rhythm: Normal rate and regular rhythm.      Comments: cabg scar  Pulmonary:      Effort: Pulmonary effort is normal. No respiratory distress.      Breath sounds: Normal breath sounds.   Musculoskeletal:      Comments: LLE bandaged. Dressings c/d/i   Skin:     Comments: picc line with some serosanguinous drainage on biopatch. One lumen   Neurological:      General: No focal deficit present.      Mental Status: She is alert and oriented to person, place, and time.   Psychiatric:          Mood and Affect: Mood normal.         Behavior: Behavior normal.         Significant Labs:   BMP:   Recent Labs   Lab 07/30/20  0417   *      K 4.4      CO2 23   BUN 30*   CREATININE 1.8*   CALCIUM 8.2*     CBC:   Recent Labs   Lab 07/29/20  0008   WBC 13.03*   HGB 10.0*   HCT 31.7*   *       Significant Imaging:

## 2020-07-30 NOTE — NURSING
PICC in place, verified by xray, ok to use per MD. Discharge instructions given to patient at bedside. Patient verbalized understanding of instructions. Patient states willingness to comply. Saline lock removed. Tele monitoring removed. Pt called mother to come pick her up

## 2020-07-30 NOTE — ASSESSMENT & PLAN NOTE
- MRI confirms osteo. Bone Bx- 7/18  - Afebrile and with resolved leukocytosis   - Wound cultures with Enterococcus, E cloacae, and GBS.   - ID consulted for ABx recommendations and will d/c axtreoman and start cefepime 2g q12h; continue vancomycin   - Vascular Surgery consulted and planning angiogram 7/24/2020.  - Patient declines amputation and wants to try conservative tx with  IV ABx's.   - CKD and Nephrology consulted.  Vascular discussed case with Nephrology and patient cleared from renal for vascular intervention.    - Podiatry planning debridement, if she remains 7/31 they will come assess for any procedure.  - Vascular surgery following with workup for revascularization procedure that she will need for healing. Vein mapping and Angiogram complete, uncertain timing for revascularization procedure  -stress test today to complete cardiac workup-     Multiple wall motion reversible defects.     Will follow ID recs for discharge.     Zosyn for 6 weeks

## 2020-07-30 NOTE — PROGRESS NOTES
[facility]  Vascular Surgery  Progress Note    Patient Name: Suyapa Connelly  MRN: [id number]  Admission Date: 7/17/2020  Primary Care Provider: Erlinda Rahman NP    Subjective:     Interval History: s/p coronary stent POD 2.  No chest pain.    Post-Op Info:  Procedure(s) (LRB):  ANGIOGRAM, CORONARY, INCLUDING BYPASS GRAFT, WITH LEFT HEART CATHETERIZATION, 9 am (N/A)  Percutaneous coronary intervention (N/A)  Ultrasound-coronary (N/A)  PTCA, Single Vessel  Stent, Drug Eluting, Single Vessel, Coronary   2 Days Post-Op       Medications:  Continuous Infusions:   sodium chloride 0.9% 75 mL/hr at 07/28/20 0645     Scheduled Meds:   allopurinoL  100 mg Oral Daily    amiodarone  400 mg Oral Daily    aspirin  81 mg Oral Daily    atorvastatin  80 mg Oral Daily    cadexomer iodine   Topical (Top) Daily    carvediloL  6.25 mg Oral Daily    clopidogreL  75 mg Oral Daily    ergocalciferol  50,000 Units Oral Q7 Days    gabapentin  100 mg Oral TID    heparin (porcine)  5,000 Units Subcutaneous Q8H    hydrALAZINE  10 mg Intravenous Once    hydrALAZINE  25 mg Oral Q8H    insulin aspart U-100  10 Units Subcutaneous TID WM    insulin detemir U-100  23 Units Subcutaneous Daily    pantoprazole  40 mg Oral Daily    piperacillin-tazobactam (ZOSYN) IVPB  4.5 g Intravenous Q8H    potassium, sodium phosphates  2 packet Oral Once    sodium chloride 0.9%  10 mL Intravenous Q6H     PRN Meds:acetaminophen, acetaminophen, ALPRAZolam, dextrose 50%, glucagon (human recombinant), insulin aspart U-100, melatonin, morphine, morphine, morphine, ondansetron, ondansetron, oxyCODONE, polyethylene glycol, sodium chloride 0.9%, Flushing PICC Protocol **AND** sodium chloride 0.9% **AND** sodium chloride 0.9%     Objective:     Vital Signs (Most Recent):  Temp: 98.5 °F (36.9 °C) (07/30/20 1630)  Pulse: 82 (07/30/20 1630)  Resp: 16 (07/30/20 1630)  BP: 135/69 (07/30/20 1630)  SpO2: 98 % (07/30/20 1630) Vital Signs (24h  Range):  Temp:  [97.6 °F (36.4 °C)-98.5 °F (36.9 °C)] 98.5 °F (36.9 °C)  Pulse:  [82-96] 82  Resp:  [16-20] 16  SpO2:  [97 %-100 %] 98 %  BP: (121-156)/(62-76) 135/69         Physical Exam  Vitals signs reviewed.   Constitutional:       General: She is not in acute distress.     Appearance: She is well-developed. She is not diaphoretic.   HENT:      Head: Normocephalic and atraumatic.   Eyes:      Conjunctiva/sclera: Conjunctivae normal.   Neck:      Musculoskeletal: Neck supple.   Cardiovascular:      Rate and Rhythm: Normal rate.      Pulses:           Femoral pulses are 2+ on the right side and 2+ on the left side.       Popliteal pulses are 2+ on the right side and 2+ on the left side.        Dorsalis pedis pulses are detected w/ Doppler on the right side and detected w/ Doppler on the left side.        Posterior tibial pulses are detected w/ Doppler on the right side and detected w/ Doppler on the left side.   Pulmonary:      Effort: Pulmonary effort is normal.   Abdominal:      General: There is no distension.      Palpations: Abdomen is soft. There is no mass.      Tenderness: There is no abdominal tenderness. There is no guarding or rebound.      Hernia: No hernia is present.   Musculoskeletal: Normal range of motion.         General: No deformity.   Feet:      Left foot:      Skin integrity: Ulcer and skin breakdown present.   Skin:     General: Skin is warm and dry.      Findings: No rash.   Neurological:      General: No focal deficit present.      Mental Status: She is alert and oriented to person, place, and time.   Psychiatric:         Mood and Affect: Mood normal.         Significant Labs:  All pertinent labs from the last 24 hours have been reviewed.    Significant Diagnostics:  I have reviewed all pertinent imaging results/findings within the past 24 hours.    Assessment/Plan:     Non healing left heel wound  -Rec L fem - BK bypass with prosthetic outpatient - will determine surgical date.  Pt is  stable for discharge   -Cont Nephrology recs  -Cont offloading, Abx, wound care, optimization of comorbidities and nutrition         Teddy Huber MD  Vascular Surgery  [facility]

## 2020-07-30 NOTE — PLAN OF CARE
[facility] HOME HEALTH ORDERS  FACE TO FACE ENCOUNTER    Patient Name: Suyapa Connelly  YOB: 1965    PCP: Erlinda Rahman NP   PCP Address: [address] [place], [place] [id number]  PCP Phone Number: [phone]  PCP Fax: [phone]    Encounter Date: 07/30/2020    Admit to Home Health    Diagnoses: Osteomyelitis L Calcaneus    Active Hospital Problems    Diagnosis  POA    *Acute osteomyelitis of left calcaneus [M86.172]  Yes    Stage 3 chronic kidney disease [N18.3]  Yes    Non healing left heel wound [S91.302A]  Yes    S/P CABG x 1 [Z95.1]  Not Applicable    Mixed hyperlipidemia [E78.2]  Yes    Chronic combined systolic and diastolic heart failure [I50.42]  Yes    Type 2 diabetes mellitus without complication, with long-term current use of insulin [E11.9, Z79.4]  Not Applicable    Essential hypertension [I10]  Yes    Peripheral artery disease [I73.9]  Yes      Resolved Hospital Problems    Diagnosis Date Resolved POA    Preop cardiovascular exam [Z01.810] 07/30/2020 Not Applicable    Hyponatremia [E87.1] 07/30/2020 Yes       Future Appointments   Date Time Provider Department Center   9/28/2020  9:00 AM Rachel Easley MD [facility]     Follow-up Information     Erlinda Rahman NP In 2 weeks.    Specialty: Family Medicine  Contact information:  [address] [facility] [id number] [phone]             Rachel Easley MD On 9/28/2020.    Specialties: Cardiology, INTERVENTIONAL CARDIOLOGY  Why: Outpatient Services, Cardiology, follow-up appointment at 9:00am. Patient has been added to waitlist.   Contact information:  [address]  [address]  [place] [address]  [phone]             Erlinda Rahman NP In 1 week.    Specialty: Family Medicine  Contact information:  [address] [facility] [id number] [phone]                     I have seen and examined this patient face to face today. My clinical findings that  support the need for the home health skilled services and home bound status are the following:  Medical restrictions requiring assistance of another human to leave home due to  IV infusion Needs.    Allergies:  Review of patient's allergies indicates:   Allergen Reactions    Ciprofloxacin      Itchiness    Contrast media      Kidney injury    Pcn [penicillins]      Rash; tolerated ceftriaxone on 1/13/20       Diet: diabetic diet: 2000 calorie Low NA    Activities: activity as tolerated    Nursing:   SN to complete comprehensive assessment including routine vital signs. Instruct on disease process and s/s of complications to report to MD. Review/verify medication list sent home with the patient at time of discharge  and instruct patient/caregiver as needed. Frequency may be adjusted depending on start of care date.    Notify MD if SBP > 160 or < 90; DBP > 90 or < 50; HR > 120 or < 50; Temp > 101; Other:        CONSULTS:    Aide to provide assistance with personal care, ADLs, and vital signs.    MISCELLANEOUS CARE:  Routine Picc line care.   D/C picc line after last Abx dose on 9/10/20.    Please fax these labs to ID clinic each Monday at   [phone]- [phone] Dr. De Anda    CBC  BMP  ESR  CRP      Medications: Review discharge medications with patient and family and provide education.      Current Discharge Medication List      START taking these medications    Details   allopurinoL (ZYLOPRIM) 100 MG tablet Take 1 tablet (100 mg total) by mouth once daily.  Qty: 30 tablet, Refills: 5      hydrALAZINE (APRESOLINE) 25 MG tablet Take 1 tablet (25 mg total) by mouth every 8 (eight) hours.  Qty: 90 tablet, Refills: 5    Comments: .      oxyCODONE (ROXICODONE) 5 MG immediate release tablet Take 1 tablet (5 mg total) by mouth every 4 (four) hours as needed.  Qty: 25 tablet, Refills: 0    Comments: Quantity prescribed more than 7 day supply? Yes, quantity medically necessary      piperacillin sodium/tazobactam  (PIPERACILLIN-TAZOBACTAM 4.5G/100ML SODIUM CHLORIDE 0.9%-READY TO MIX) Inject 100 mLs (4.5 g total) into the vein every 8 (eight) hours.  Qty:     Continue Zosyn through 9/10/20      CONTINUE these medications which have NOT CHANGED    Details   acetaminophen (TYLENOL) 500 MG tablet Take 1,000 mg by mouth daily as needed for Pain.      atorvastatin (LIPITOR) 80 MG tablet Take 1 tablet (80 mg total) by mouth once daily.  Qty: 90 tablet, Refills: 3      carvediloL (COREG) 6.25 MG tablet Take 6.25 mg by mouth once daily. Pt thinks she take BID. Unsure.      clopidogreL (PLAVIX) 75 mg tablet Take 1 tablet (75 mg total) by mouth once daily.  Qty: 30 tablet, Refills: 11      escitalopram oxalate (LEXAPRO) 10 MG tablet Take 1 tablet (10 mg total) by mouth once daily.  Qty: 30 tablet, Refills: 0      gabapentin (NEURONTIN) 300 MG capsule Take 1 capsule (300 mg total) by mouth 3 (three) times daily.  Qty: 90 capsule, Refills: 5      insulin aspart U-100 (NOVOLOG) 100 unit/mL injection Inject 9 Units into the skin 3 (three) times daily with meals.  Qty: 10 mL, Refills: 0    Comments: Correction scale Blood sugar (150-200) +1      insulin glargine 100 units/mL (3mL) SubQ pen Inject 10 Units into the skin every evening.  Qty: 9 mL, Refills: 1      multivitamin (THERAGRAN) per tablet Take 1 tablet by mouth once daily.      ondansetron (ZOFRAN-ODT) 4 MG TbDL Take 1 tablet (4 mg total) by mouth every 12 (twelve) hours as needed (vomiting).  Qty: 10 tablet, Refills: 0      sevelamer carbonate (RENVELA) 800 mg Tab Take 1 tablet (800 mg total) by mouth 3 (three) times daily with meals.  Qty: 90 tablet, Refills: 11      torsemide (DEMADEX) 20 MG Tab       amiodarone (PACERONE) 400 MG tablet Take 1 tablet (400 mg total) by mouth once daily.  Qty: 30 tablet, Refills: 11      aspirin 81 MG Chew Take 1 tablet (81 mg total) by mouth once daily.  Qty: 90 tablet, Refills: 3      blood sugar diagnostic (ONETOUCH ULTRA BLUE TEST STRIP) Strp  Use to test blood glucose twice daily  Qty: 100 each, Refills: 11      blood-glucose meter Misc Use to test blood glucose  Qty: 1 each, Refills: 0      lancets 30 gauge Misc use to test blood glucose 2 (two) times daily with meals.  Qty: 100 each, Refills: 11             I certify that this patient is confined to her home and needs intermittent skilled nursing care.

## 2020-07-30 NOTE — DISCHARGE SUMMARY
[facility] Hospital Medicine  Discharge Summary      Patient Name: Suyapa Connelly  MRN: [id number]  Admission Date: 7/17/2020  Hospital Length of Stay: 13 days  Discharge Date and Time:  07/30/2020 10:08 AM  Attending Physician: Hitesh Mason MD   Discharging Provider: Hitesh Mason MD  Primary Care Provider: Erlinda Ramhan NP      HPI:   Suyapa Connelly is a 54 y.o. female that (in part)  has a past medical history of Anxiety, Depression, Diabetes mellitus, GERD (gastroesophageal reflux disease), Hyperlipemia, Hypertension, and Myocardial infarction. Patient has a past surgical history that includes Angiogram - Right Extremity (Right, 7/9/15); angiogram-left leg (10/6/15); Catheterization of both left and right heart (N/A, 12/18/2019); Coronary angiography (N/A, 12/18/2019); Coronary Artery Bypass Graft (CABG) (N/A, 1/14/2020); and Insertion of tunneled central venous hemodialysis catheter (N/A, 1/27/2020). Presents to [facility] Emergency Department complaining of the left worsening heel foot wound. Poor wound healing.  Patient became concerned due to malodorous drainage.  Pain with ambulation and weight-bearing.  Associated hyperglycemia.  Denies fever chills.    In the emergency department physical exam concerning for osteomyelitis secondary to diabetic foot ulcer in the setting of uncontrolled diabetes.  Routine laboratory studies revealed hyperglycemia.  X-ray of the foot performed.  Concerning for osteomyelitis.  MRI confirmed this.  Podiatry was consulted.  Broad-spectrum antibiotics were initiated in the ED a special consideration given to risk of Pseudomonas infection.    Hospital medicine has been asked to admit to inpatient for further evaluation and treatment.     Procedure(s) (LRB):  ANGIOGRAM, CORONARY, INCLUDING BYPASS GRAFT, WITH LEFT HEART CATHETERIZATION, 9 am (N/A)  Percutaneous coronary intervention (N/A)  Ultrasound-coronary  (N/A)  PTCA, Single Vessel  Stent, Drug Eluting, Single Vessel, Coronary      Hospital Course:   Suyapa Connelly is a 54 y.o. female that (in part)  has a past medical history of Anxiety, Depression, Diabetes mellitus, GERD (gastroesophageal reflux disease), Hyperlipemia, Hypertension, and Myocardial infarction.  has a past surgical history that includes Angiogram - Right Extremity (Right, 7/9/15); angiogram-left leg (10/6/15); Catheterization of both left and right heart (N/A, 12/18/2019); Coronary angiography (N/A, 12/18/2019); Coronary Artery Bypass Graft (CABG) (N/A, 1/14/2020); and Insertion of tunneled central venous hemodialysis catheter (N/A, 1/27/2020). Presents to [facility] Emergency Department complaining of the left heel foot wound.  MRI confirmed osteo. Podiatry, ID, and vascular were consulted.  Podiatry discussed options with patient. She has chosen 6 weeks of IV abx. ID has been consulted to tailor abx. She has been continued on Aztreonam.  Blood cultures were NG. Bone bx on 7/18 has grown Enterococcus faecalis, Grp B Strep, and Enterobacter cloacae. Vascular Surgery consulted and did an angiogram which revealed adequate artery for access the left SFA and AK popliteal artery occlusion.  Patient with renal failure and Nephrology consulted.  Renal function continues to improve and ok to proceed with vascular intervention.  Podiatry planning debridement post vascular procedure.  Creatinine peaked at 2.4 and is currently 1.2. patient had a stress test on 7/27 that showed reversible defects in anterior, inferior and lateral walls. Podiatry signed off. ID consulted to make final ID recs for home.  Patient had an abnormal stress test and went for Angio on 7/28 with stent to Cx. Cards continued to follow the patient.  Nephrology followed for renal failure. Patient will be discharged to home with 6 weeks of Zosyn. Out patient wound care. Follow up with vascular as well. Diet- low NA,  ADA 2000 arya diet.        Consults:   Consults (From admission, onward)        Status Ordering Provider     Inpatient consult to Cardiology  Once     Provider:  Rachel Barakat MD    Completed AVELINA DAUGHERTY     Inpatient consult to Infectious Diseases  Once     Provider:  Verenice Myles MD    Completed ROSIO MAYES     Inpatient consult to Nephrology  Once     Provider:  Luisana Payne MD    Completed LESLY LEYVA     Inpatient consult to PICC team ([facility])  Once     Provider:  (Not yet assigned)    Completed ELSA YO     Inpatient consult to Podiatry  Once     Provider:  Rosio Mayes DPM    Completed MITESH ROSA     Inpatient consult to Social Work  Once     Provider:  (Not yet assigned)    Completed RACHEL BARAKAT     Inpatient consult to Vascular Surgery  Once     Provider:  Avelina Daugherty MD    Completed ROSIO MAYES          No new Assessment & Plan notes have been filed under this hospital service since the last note was generated.  Service: Hospital Medicine    Final Active Diagnoses:    Diagnosis Date Noted POA    PRINCIPAL PROBLEM:  Acute osteomyelitis of left calcaneus [M86.172] 07/18/2020 Yes    Stage 3 chronic kidney disease [N18.3] 07/18/2020 Yes    Non healing left heel wound [S91.302A] 07/17/2020 Yes    S/P CABG x 1 [Z95.1] 01/27/2020 Not Applicable    Mixed hyperlipidemia [E78.2] 12/13/2019 Yes    Chronic combined systolic and diastolic heart failure [I50.42] 12/08/2019 Yes    Type 2 diabetes mellitus without complication, with long-term current use of insulin [E11.9, Z79.4] 05/05/2018 Not Applicable    Essential hypertension [I10] 05/05/2018 Yes    Peripheral artery disease [I73.9]  Yes      Problems Resolved During this Admission:    Diagnosis Date Noted Date Resolved POA    Preop cardiovascular exam [Z01.810] 07/25/2020 07/30/2020 Not Applicable    Hyponatremia [E87.1] 07/22/2020 07/30/2020 Yes       Discharged Condition:  good    Disposition: Home-Health Care [facility]    Follow Up:  Follow-up Information     Erlinda Rahman NP In 2 weeks.    Specialty: Family Medicine  Contact information:  [address] [facility] [address]  [phone]             Rachel Easley MD On 9/28/2020.    Specialties: Cardiology, INTERVENTIONAL CARDIOLOGY  Why: Outpatient Services, Cardiology, follow-up appointment at 9:00am. Patient has been added to waitlist.   Contact information:  [address]  [address]  [facility] [address]  [phone]             Erlinda Rahman NP In 1 week.    Specialty: Family Medicine  Contact information:  [address] [facility] [address]  [phone]                 Patient Instructions:   No discharge procedures on file.    Significant Diagnostic Studies:     Pending Diagnostic Studies:     Procedure Component Value Units Date/Time    IR Angiogram Extremity Unilateral [[id number]] Resulted: 07/24/20 1245    Order Status: Sent Lab Status: In process Updated: 07/24/20 1416    IR Aortagram Abdominal Serialogram [[id number]] Resulted: 07/24/20 1416    Order Status: Sent Lab Status: In process Updated: 07/24/20 1416    IR Ultrasound Guidance [[id number]] Resulted: 07/24/20 1246    Order Status: Sent Lab Status: In process Updated: 07/24/20 1416         Medications:  Reconciled Home Medications:      Medication List      START taking these medications    allopurinoL 100 MG tablet  Commonly known as: ZYLOPRIM  Take 1 tablet (100 mg total) by mouth once daily.  Start taking on: July 31, 2020     hydrALAZINE 25 MG tablet  Commonly known as: APRESOLINE  Take 1 tablet (25 mg total) by mouth every 8 (eight) hours.     oxyCODONE 5 MG immediate release tablet  Commonly known as: ROXICODONE  Take 1 tablet (5 mg total) by mouth every 4 (four) hours as needed.     PIPERACILLIN-TAZOBACTAM 4.5G/100ML SODIUM CHLORIDE 0.9%-READY TO MIX  Inject 100 mLs (4.5 g total) into the vein every 8 (eight) hours.        CHANGE how you take  these medications    insulin glargine 100 units/mL (3mL) SubQ pen  Inject 10 Units into the skin every evening.  What changed: additional instructions     ONETOUCH ULTRA BLUE TEST STRIP Strp  Generic drug: blood sugar diagnostic  Use to test blood glucose twice daily  What changed: additional instructions        CONTINUE taking these medications    acetaminophen 500 MG tablet  Commonly known as: TYLENOL  Take 1,000 mg by mouth daily as needed for Pain.     amiodarone 400 MG tablet  Commonly known as: PACERONE  Take 1 tablet (400 mg total) by mouth once daily.     aspirin 81 MG Chew  Take 1 tablet (81 mg total) by mouth once daily.     atorvastatin 80 MG tablet  Commonly known as: LIPITOR  Take 1 tablet (80 mg total) by mouth once daily.     carvediloL 6.25 MG tablet  Commonly known as: COREG  Take 6.25 mg by mouth once daily. Pt thinks she take BID. Unsure.     clopidogreL 75 mg tablet  Commonly known as: PLAVIX  Take 1 tablet (75 mg total) by mouth once daily.     escitalopram oxalate 10 MG tablet  Commonly known as: LEXAPRO  Take 1 tablet (10 mg total) by mouth once daily.     gabapentin 300 MG capsule  Commonly known as: NEURONTIN  Take 1 capsule (300 mg total) by mouth 3 (three) times daily.     insulin aspart U-100 100 unit/mL injection  Commonly known as: NOVOLOG  Inject 9 Units into the skin 3 (three) times daily with meals.     multivitamin per tablet  Commonly known as: THERAGRAN  Take 1 tablet by mouth once daily.     ondansetron 4 MG Tbdl  Commonly known as: ZOFRAN-ODT  Take 1 tablet (4 mg total) by mouth every 12 (twelve) hours as needed (vomiting).     ONETOUCH DELICA PLUS LANCET 30 gauge Misc  Generic drug: lancets  use to test blood glucose 2 (two) times daily with meals.     ONETOUCH ULTRA2 METER Misc  Generic drug: blood-glucose meter  Use to test blood glucose     sevelamer carbonate 800 mg Tab  Commonly known as: RENVELA  Take 1 tablet (800 mg total) by mouth 3 (three) times daily with meals.      torsemide 20 MG Tab  Commonly known as: DEMADEX            Indwelling Lines/Drains at time of discharge:   Lines/Drains/Airways     None                 Time spent on the discharge of patient: > 30  minutes  Patient was seen and examined on the date of discharge and determined to be suitable for discharge.         Hitesh Barrow MD  Department of Hospital Medicine  [facility]

## 2020-07-30 NOTE — ASSESSMENT & PLAN NOTE
-Rec L fem - BK bypass with prosthetic outpatient - will determine surgical date.  Pt is stable for discharge   -Cont Nephrology recs  -Cont offloading, Abx, wound care, optimization of comorbidities and nutrition

## 2020-07-31 ENCOUNTER — PATIENT OUTREACH (OUTPATIENT)
Dept: ADMINISTRATIVE | Facility: CLINIC | Age: 55
End: 2020-07-31

## 2020-08-03 NOTE — PROGRESS NOTES
Spoke with patient states that she neither the pharmacy received the prescription for Novolog or Lexapro. Spoke with Sam at [facility]'s listed, states that it shows that the patient received Lexapro but they did not receive a prescription for Novolog. Patient states she did not get Lexapro and will reach out to pharmacy. Novolog looks like a printed prescription.Message sent to ordering physician.

## 2020-08-03 NOTE — PATIENT INSTRUCTIONS
Discharge Instructions for Osteomyelitis  You have a condition called osteomyelitis. This is a bone infection caused by bacteria or fungi. The infection may have come from one area of your body and spread to the bone by traveling through the blood. Osteomyelitis is described as acute when the infection is new, and chronic when you have had the infection for a longer time. If you have any questions or concerns, call your healthcare provider.  Home care  · Take your medicine exactly as directed. If you were given antibiotics or antifungal medicine, make sure you finish the prescription--even if you feel better. If you dont finish the medicine, the infection may return and may make future infections harder to treat.  · Be careful not to injure the area where you have the infection.  · Carefully follow all instructions for taking care of any wounds.  · Use a splint, sling, or brace as directed by your healthcare provider.  Follow-up care  Make a follow-up appointment as directed by our staff.  When to seek medical care  Call your healthcare provider if you have any of the following:  · Increasing pain, redness, swelling, or drainage in the infected area  · Fever 100.4°F (38°C) or greater, or as advised  · Increasing fatigue or feeling tired   Date Last Reviewed: 12/1/2016  © 4285-2477 [employer]. [address] [place] [address], [place] [address]. All rights reserved. This information is not intended as a substitute for professional medical care. Always follow your healthcare professional's instructions.

## 2020-08-04 ENCOUNTER — TELEPHONE (OUTPATIENT)
Dept: INFECTIOUS DISEASES | Facility: CLINIC | Age: 55
End: 2020-08-04

## 2020-08-04 NOTE — TELEPHONE ENCOUNTER
Called Phill with [facility] at [phone].  Pt will be getting labs done on Thursday 8/6/2020 at the [place] location.   Phill will fax the results

## 2020-08-06 DIAGNOSIS — I70.229 CRITICAL LOWER LIMB ISCHEMIA: Primary | ICD-10-CM

## 2020-08-07 ENCOUNTER — HOSPITAL ENCOUNTER (OUTPATIENT)
Dept: WOUND CARE | Facility: HOSPITAL | Age: 55
Discharge: HOME | End: 2020-08-07
Attending: INTERNAL MEDICINE
Payer: MEDICAID

## 2020-08-07 ENCOUNTER — TELEPHONE (OUTPATIENT)
Dept: VASCULAR SURGERY | Facility: CLINIC | Age: 55
End: 2020-08-07

## 2020-08-07 ENCOUNTER — TELEPHONE (OUTPATIENT)
Dept: INFECTIOUS DISEASES | Facility: CLINIC | Age: 55
End: 2020-08-07

## 2020-08-07 VITALS
RESPIRATION RATE: 18 BRPM | HEART RATE: 85 BPM | SYSTOLIC BLOOD PRESSURE: 191 MMHG | TEMPERATURE: 97 F | DIASTOLIC BLOOD PRESSURE: 82 MMHG

## 2020-08-07 DIAGNOSIS — I73.9 PERIPHERAL VASCULAR DISEASE: ICD-10-CM

## 2020-08-07 DIAGNOSIS — M79.671 RIGHT FOOT PAIN: ICD-10-CM

## 2020-08-07 DIAGNOSIS — Z79.4 TYPE 2 DIABETES MELLITUS WITHOUT COMPLICATION, WITH LONG-TERM CURRENT USE OF INSULIN: ICD-10-CM

## 2020-08-07 DIAGNOSIS — E11.9 TYPE 2 DIABETES MELLITUS WITHOUT COMPLICATION, WITH LONG-TERM CURRENT USE OF INSULIN: ICD-10-CM

## 2020-08-07 DIAGNOSIS — M86.172 ACUTE OSTEOMYELITIS OF LEFT CALCANEUS: Primary | ICD-10-CM

## 2020-08-07 PROCEDURE — 99213 OFFICE O/P EST LOW 20 MIN: CPT | Performed by: PODIATRIST

## 2020-08-07 PROCEDURE — 99213 PR OFFICE/OUTPT VISIT, EST, LEVL III, 20-29 MIN: ICD-10-PCS | Mod: ,,, | Performed by: PODIATRIST

## 2020-08-07 PROCEDURE — 99213 OFFICE O/P EST LOW 20 MIN: CPT | Mod: ,,, | Performed by: PODIATRIST

## 2020-08-07 RX ORDER — OXYCODONE AND ACETAMINOPHEN 5; 325 MG/1; MG/1
1 TABLET ORAL EVERY 6 HOURS PRN
Qty: 28 TABLET | Refills: 0 | Status: SHIPPED | OUTPATIENT
Start: 2020-08-07 | End: 2020-11-06 | Stop reason: SDUPTHER

## 2020-08-07 NOTE — TELEPHONE ENCOUNTER
Call and gave patient date and time of preop appointment. Also confirmed the date of her surgery. Explained that it would be at the [facility]. She stated understanding.

## 2020-08-07 NOTE — PROGRESS NOTES
"[facility]                Progress Note    Subjective:       Patient ID: Suyapa Connelly is a 54 y.o. female.    Chief Complaint: Non-healing Wound    Pt arrived to [facility]; ambulating with limp favoring LLE. Pt denies any fever, chills, sob, cough, flu like symptoms, cp, ha; pt does report feeling 'a little nervous'. Pt did report taking all medications today as ordered. PICC lined to E; placed 7.28 when admitted for cardiac stent placement x1. Pt is s/p CABG x1 vessel on 1/24.2020; when pt states she 'developed this spot on my heel'. Since January pt has been treating wound herself using neosporin & bandaids. MRI done 7.17.2020 & noted: 'Osteomyelitis involving the posterior aspect of the calcaneus". Pt was recently admitted in July for cardiac stent placement & PICC line was placed; pt was started on Zosyn 4.5mg IV q8rhs. Last ATTILA done on 7.20.2020: Lt 2.13 & Rt 2.13. Pt is scheduled for Lt Fem-Pop bypass by Dr. Huber on 8.18.2020. Bilateral edema noted to LEs. Pt reports eating habits are 'not the best'; reports lack of appetite. Lidocaine 5% ointment place to wound bed. Notified MD of all findings; suggested HBO tx once revascularized pending insurance approval. Plan for HBO next wk along with wound care.       Review of Systems   Constitutional: Negative.    Respiratory: Negative.    Cardiovascular: Negative.    Gastrointestinal: Negative.    Musculoskeletal: Positive for arthralgias.   Skin: Positive for wound.   Neurological: Positive for numbness.         Objective:        Physical Exam  Constitutional:       Appearance: She is well-developed.   Cardiovascular:      Comments: Dorsalis pedis and posterior tibial pulses are diminished Bilaterally. Toes are cool to touch. Feet are warm proximally.There is decreased digital hair. Skin is atrophic, slightly hyperpigmented, and mildly edematous   Pulmonary:      Effort: No respiratory distress.   Musculoskeletal:    "      General: Tenderness present.      Comments: Adequate joint range of motion without pain, limitation, nor crepitation Bilateral feet and ankle joints. Muscle strength is 5/5 in all groups bilaterally.    Feet:      Right foot:      Skin integrity: Callus and dry skin present. No ulcer or skin breakdown.      Left foot:      Skin integrity: Dry skin present. No ulcer.   Skin:     General: Skin is dry.      Findings: No erythema.      Comments: See wound description.      Neurological:      Mental Status: She is alert.      Comments: [place] 5.07 monofilamant testing is diminished Tyrell feet. Sharp/dull sensation diminished Bilaterally. Light touch absent Bilaterally.          Vitals:    08/07/20 1325   BP: (!) 191/82   Pulse: 85   Resp: 18   Temp: 97.4 °F (36.3 °C)       Assessment:           ICD-10-CM ICD-9-CM   1. Acute osteomyelitis of left calcaneus  M86.172 730.07   2. Type 2 diabetes mellitus without complication, with long-term current use of insulin  E11.9 250.00    Z79.4 V58.67   3. Right foot pain  M79.671 729.5   4. Peripheral vascular disease  I73.9 443.9            Wound 08/07/20 1342 Diabetic Ulcer Left Heel (Active)   08/07/20 1342    Pre-existing: Yes   Primary Wound Type: Diabetic ulc   Side: Left   Orientation:    Location: Heel   Wound Number (optional):    Ankle-Brachial Index:    Pulses:    Removal Indication and Assessment:    Wound Outcome:    (Retired) Wound Type:    (Retired) Wound Length (cm):    (Retired) Wound Width (cm):    (Retired) Depth (cm):    Wound Description (Comments):    Removal Indications:    Wound Image   08/07/20 1300   Wound WDL ex 08/07/20 1300   Dressing Appearance Dried drainage 08/07/20 1300   Drainage Amount Moderate 08/07/20 1300   Drainage Characteristics/Odor Serosanguineous 08/07/20 1300   Appearance Pink;Yellow;Eschar;Tendon 08/07/20 1300   Tissue loss description Full thickness 08/07/20 1300   Black (%), Wound Tissue Color 50 % 08/07/20 1300   Red (%),  Wound Tissue Color 20 % 08/07/20 1300   Yellow (%), Wound Tissue Color 30 % 08/07/20 1300   Periwound Area Intact;Redness 08/07/20 1300   Wound Edges Rolled/closed;Defined 08/07/20 1300   Wound Length (cm) 4.7 cm 08/07/20 1300   Wound Width (cm) 4.5 cm 08/07/20 1300   Wound Depth (cm) 0.5 cm 08/07/20 1300   Wound Volume (cm^3) 10.58 cm^3 08/07/20 1300   Wound Surface Area (cm^2) 21.15 cm^2 08/07/20 1300   Tunneling (depth (cm)/location) 0 08/07/20 1300   Undermining (depth (cm)/location) 0 08/07/20 1300   Care Cleansed with:;Soap and water 08/07/20 1300   Dressing Applied;Other (see comments) 08/07/20 1300   Off Loading Football dressing;Off loading shoe 08/07/20 1300           Plan:            Pending revascularization per Dr. Huber. Will plan for debridement after revascularization.     Debridement: Saline moistened gauze.     Dressings: per above  Offloading:Foam    Follow-up:Patient is to return to the clinic in 2 weeks  for follow-up but should call [facility] immediately if any signs of infection, such as fever, chills, sweats, increased redness or pain.    Short-term goals include maintaining good offloading and minimizing bioburden, promoting granulation and epithelialization to healing.  Long-term goals include keeping the wound healed by good offloading and medical management under the direction of internist.          Orders Placed This Encounter   Procedures    Ambulatory referral/consult to Wound Clinic     Standing Status:   Standing     Number of Occurrences:   1     Referral Priority:   Routine     Referral Type:   Consultation     Referral Reason:   Specialty Services Required     Requested Specialty:   Wound Care     Number of Visits Requested:   1    Change dressing     Clean wound with NS. Apply Iodoflex to wound bed, cover with mextra. Football drsg (cast padding x3, stockinet ONLY). Darco. Pt to return to [facility] in 1 wk for HBO eval & Wound care.        Follow up in about 1 week (around  8/14/2020) for HBO eval & Wound Care.

## 2020-08-12 ENCOUNTER — HOSPITAL ENCOUNTER (OUTPATIENT)
Dept: PREADMISSION TESTING | Facility: HOSPITAL | Age: 55
Discharge: HOME | End: 2020-08-12
Attending: SURGERY
Payer: MEDICAID

## 2020-08-12 ENCOUNTER — HOSPITAL ENCOUNTER (EMERGENCY)
Facility: HOSPITAL | Age: 55
Discharge: HOME | End: 2020-08-12
Attending: EMERGENCY MEDICINE
Payer: MEDICAID

## 2020-08-12 ENCOUNTER — TELEPHONE (OUTPATIENT)
Dept: INFECTIOUS DISEASES | Facility: CLINIC | Age: 55
End: 2020-08-12

## 2020-08-12 ENCOUNTER — HOSPITAL ENCOUNTER (OUTPATIENT)
Dept: WOUND CARE | Facility: HOSPITAL | Age: 55
Discharge: HOME | End: 2020-08-12
Attending: FAMILY MEDICINE
Payer: MEDICAID

## 2020-08-12 VITALS
HEIGHT: 65 IN | SYSTOLIC BLOOD PRESSURE: 187 MMHG | OXYGEN SATURATION: 99 % | TEMPERATURE: 99 F | BODY MASS INDEX: 32.49 KG/M2 | HEART RATE: 80 BPM | RESPIRATION RATE: 18 BRPM | WEIGHT: 195 LBS | DIASTOLIC BLOOD PRESSURE: 87 MMHG

## 2020-08-12 VITALS
SYSTOLIC BLOOD PRESSURE: 176 MMHG | TEMPERATURE: 98 F | DIASTOLIC BLOOD PRESSURE: 74 MMHG | HEART RATE: 65 BPM | RESPIRATION RATE: 20 BRPM

## 2020-08-12 VITALS
SYSTOLIC BLOOD PRESSURE: 175 MMHG | HEART RATE: 86 BPM | BODY MASS INDEX: 31.75 KG/M2 | OXYGEN SATURATION: 100 % | HEIGHT: 66 IN | WEIGHT: 197.56 LBS | RESPIRATION RATE: 18 BRPM | DIASTOLIC BLOOD PRESSURE: 75 MMHG | TEMPERATURE: 98 F

## 2020-08-12 DIAGNOSIS — R79.89 ELEVATED SERUM CREATININE: Primary | ICD-10-CM

## 2020-08-12 DIAGNOSIS — Z79.4 TYPE 2 DIABETES MELLITUS WITHOUT COMPLICATION, WITH LONG-TERM CURRENT USE OF INSULIN: Primary | ICD-10-CM

## 2020-08-12 DIAGNOSIS — E11.9 TYPE 2 DIABETES MELLITUS WITHOUT COMPLICATION, WITH LONG-TERM CURRENT USE OF INSULIN: Primary | ICD-10-CM

## 2020-08-12 DIAGNOSIS — S91.302A NON HEALING LEFT HEEL WOUND: ICD-10-CM

## 2020-08-12 DIAGNOSIS — I73.9 PERIPHERAL ARTERY DISEASE: Primary | ICD-10-CM

## 2020-08-12 LAB
ALBUMIN SERPL BCP-MCNC: 2.9 G/DL (ref 3.5–5.2)
ALP SERPL-CCNC: 114 U/L (ref 55–135)
ALT SERPL W/O P-5'-P-CCNC: 10 U/L (ref 10–44)
ANION GAP SERPL CALC-SCNC: 15 MMOL/L (ref 8–16)
AST SERPL-CCNC: 16 U/L (ref 10–40)
BACTERIA #/AREA URNS HPF: NORMAL /HPF
BASOPHILS # BLD AUTO: 0.04 K/UL (ref 0–0.2)
BASOPHILS NFR BLD: 0.5 % (ref 0–1.9)
BILIRUB SERPL-MCNC: 0.3 MG/DL (ref 0.1–1)
BILIRUB UR QL STRIP: NEGATIVE
BUN SERPL-MCNC: 28 MG/DL (ref 6–20)
CALCIUM SERPL-MCNC: 8.9 MG/DL (ref 8.7–10.5)
CHLORIDE SERPL-SCNC: 101 MMOL/L (ref 95–110)
CLARITY UR: CLEAR
CO2 SERPL-SCNC: 25 MMOL/L (ref 23–29)
COLOR UR: COLORLESS
CREAT SERPL-MCNC: 3 MG/DL (ref 0.5–1.4)
DIFFERENTIAL METHOD: ABNORMAL
EOSINOPHIL # BLD AUTO: 0.6 K/UL (ref 0–0.5)
EOSINOPHIL NFR BLD: 7.3 % (ref 0–8)
ERYTHROCYTE [DISTWIDTH] IN BLOOD BY AUTOMATED COUNT: 14.7 % (ref 11.5–14.5)
EST. GFR  (AFRICAN AMERICAN): 20 ML/MIN/1.73 M^2
EST. GFR  (NON AFRICAN AMERICAN): 17 ML/MIN/1.73 M^2
GLUCOSE SERPL-MCNC: 199 MG/DL (ref 70–110)
GLUCOSE UR QL STRIP: ABNORMAL
HCT VFR BLD AUTO: 30.1 % (ref 37–48.5)
HGB BLD-MCNC: 9.4 G/DL (ref 12–16)
HGB UR QL STRIP: NEGATIVE
HYALINE CASTS #/AREA URNS LPF: 0 /LPF
IMM GRANULOCYTES # BLD AUTO: 0.03 K/UL (ref 0–0.04)
IMM GRANULOCYTES NFR BLD AUTO: 0.4 % (ref 0–0.5)
KETONES UR QL STRIP: NEGATIVE
LEUKOCYTE ESTERASE UR QL STRIP: NEGATIVE
LYMPHOCYTES # BLD AUTO: 1.4 K/UL (ref 1–4.8)
LYMPHOCYTES NFR BLD: 17.5 % (ref 18–48)
MCH RBC QN AUTO: 31.1 PG (ref 27–31)
MCHC RBC AUTO-ENTMCNC: 31.2 G/DL (ref 32–36)
MCV RBC AUTO: 100 FL (ref 82–98)
MICROSCOPIC COMMENT: NORMAL
MONOCYTES # BLD AUTO: 0.7 K/UL (ref 0.3–1)
MONOCYTES NFR BLD: 8.2 % (ref 4–15)
NEUTROPHILS # BLD AUTO: 5.3 K/UL (ref 1.8–7.7)
NEUTROPHILS NFR BLD: 66.1 % (ref 38–73)
NITRITE UR QL STRIP: NEGATIVE
NRBC BLD-RTO: 0 /100 WBC
PH UR STRIP: 6 [PH] (ref 5–8)
PLATELET # BLD AUTO: 406 K/UL (ref 150–350)
PMV BLD AUTO: 10.7 FL (ref 9.2–12.9)
POTASSIUM SERPL-SCNC: 3 MMOL/L (ref 3.5–5.1)
PROT SERPL-MCNC: 8.9 G/DL (ref 6–8.4)
PROT UR QL STRIP: ABNORMAL
RBC # BLD AUTO: 3.02 M/UL (ref 4–5.4)
RBC #/AREA URNS HPF: 2 /HPF (ref 0–4)
SODIUM SERPL-SCNC: 141 MMOL/L (ref 136–145)
SP GR UR STRIP: 1.01 (ref 1–1.03)
SQUAMOUS #/AREA URNS HPF: 2 /HPF
URN SPEC COLLECT METH UR: ABNORMAL
UROBILINOGEN UR STRIP-ACNC: NEGATIVE EU/DL
WBC # BLD AUTO: 8.04 K/UL (ref 3.9–12.7)
WBC #/AREA URNS HPF: 2 /HPF (ref 0–5)

## 2020-08-12 PROCEDURE — 11042 DBRDMT SUBQ TIS 1ST 20SQCM/<: CPT | Mod: ,,, | Performed by: FAMILY MEDICINE

## 2020-08-12 PROCEDURE — 96361 HYDRATE IV INFUSION ADD-ON: CPT

## 2020-08-12 PROCEDURE — 11042 DEBRIDEMENT: ICD-10-PCS | Mod: ,,, | Performed by: FAMILY MEDICINE

## 2020-08-12 PROCEDURE — 99214 OFFICE O/P EST MOD 30 MIN: CPT | Mod: 25,,, | Performed by: FAMILY MEDICINE

## 2020-08-12 PROCEDURE — 27201912 HC WOUND CARE DEBRIDEMENT SUPPLIES: Performed by: FAMILY MEDICINE

## 2020-08-12 PROCEDURE — 25000003 PHARM REV CODE 250: Performed by: PHYSICIAN ASSISTANT

## 2020-08-12 PROCEDURE — 80053 COMPREHEN METABOLIC PANEL: CPT

## 2020-08-12 PROCEDURE — 99284 EMERGENCY DEPT VISIT MOD MDM: CPT | Mod: 25

## 2020-08-12 PROCEDURE — 99214 PR OFFICE/OUTPT VISIT, EST, LEVL IV, 30-39 MIN: ICD-10-PCS | Mod: 25,,, | Performed by: FAMILY MEDICINE

## 2020-08-12 PROCEDURE — 81000 URINALYSIS NONAUTO W/SCOPE: CPT

## 2020-08-12 PROCEDURE — 63600175 PHARM REV CODE 636 W HCPCS: Performed by: PHYSICIAN ASSISTANT

## 2020-08-12 PROCEDURE — 11042 DBRDMT SUBQ TIS 1ST 20SQCM/<: CPT | Performed by: FAMILY MEDICINE

## 2020-08-12 PROCEDURE — 96374 THER/PROPH/DIAG INJ IV PUSH: CPT | Mod: 59

## 2020-08-12 PROCEDURE — 85025 COMPLETE CBC W/AUTO DIFF WBC: CPT

## 2020-08-12 RX ORDER — POTASSIUM CHLORIDE 20 MEQ/1
40 TABLET, EXTENDED RELEASE ORAL
Status: COMPLETED | OUTPATIENT
Start: 2020-08-12 | End: 2020-08-12

## 2020-08-12 RX ORDER — SODIUM CHLORIDE 9 MG/ML
500 INJECTION, SOLUTION INTRAVENOUS
Status: COMPLETED | OUTPATIENT
Start: 2020-08-12 | End: 2020-08-12

## 2020-08-12 RX ORDER — CEFEPIME HYDROCHLORIDE 1 G/50ML
1 INJECTION, SOLUTION INTRAVENOUS
Status: DISCONTINUED | OUTPATIENT
Start: 2020-08-12 | End: 2020-08-12

## 2020-08-12 RX ADMIN — CEFEPIME HYDROCHLORIDE 1 G: 1 INJECTION, SOLUTION INTRAVENOUS at 07:08

## 2020-08-12 RX ADMIN — POTASSIUM CHLORIDE 40 MEQ: 1500 TABLET, EXTENDED RELEASE ORAL at 08:08

## 2020-08-12 RX ADMIN — SODIUM CHLORIDE 500 ML: 0.9 INJECTION, SOLUTION INTRAVENOUS at 07:08

## 2020-08-12 NOTE — ED PROVIDER NOTES
Encounter Date: 8/12/2020       History     Chief Complaint   Patient presents with    Abnormal Lab     Pt was contacted but PCP to seek ER for follow up blood work secondary to abnormal CR & BUN labs. No complaints denies any symptoms at this time.      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 Per chart review Dr. De Anda, Infectious Disease, recommended the patient come to ED for ROSLYN.  He states that he would like to discontinue Zosyn and sent message to medical assistant to schedule follow up for the patient. Last visit with wound care was today. She reports mildly decreased PO intake within the past few days and states she began taking lasix again 4 days ago for lower extremity edema bilaterally. Denies worsening pain, redness or swelling to left heel, fever,  vomiting, diarrhea, abdominal pain, difficulty urinating.         Review of patient's allergies indicates:   Allergen Reactions    Ciprofloxacin      Itchiness    Contrast media      Kidney injury     Past Medical History:   Diagnosis Date    Anxiety     Depression     Diabetes mellitus     type 2    Diabetes mellitus, type 2     GERD (gastroesophageal reflux disease)     Hyperlipemia     Hypertension     Myocardial infarction 2010    minor-caused by stress per pt.     Past Surgical History:   Procedure Laterality Date    Angiogram - Right Extremity Right 7/9/15    angiogram-left leg  10/6/15    CATHETERIZATION OF BOTH LEFT AND RIGHT HEART N/A 12/18/2019    Procedure: CATHETERIZATION, HEART, BOTH LEFT AND RIGHT;  Surgeon: Que Fernando III, MD;  Location: [facility] CATH LAB;  Service: Cardiology;  Laterality: N/A;    CORONARY ANGIOGRAPHY N/A 12/18/2019    Procedure: ANGIOGRAM, CORONARY ARTERY;  Surgeon: Que Fernando III, MD;  Location: [facility] CATH LAB;  Service: Cardiology;  Laterality:  N/A;    CORONARY ANGIOGRAPHY INCLUDING BYPASS GRAFTS WITH CATHETERIZATION OF LEFT HEART N/A 7/28/2020    Procedure: ANGIOGRAM, CORONARY, INCLUDING BYPASS GRAFT, WITH LEFT HEART CATHETERIZATION, 9 am;  Surgeon: Rachel Easley MD;  Location: [facility] CATH LAB;  Service: Cardiology;  Laterality: N/A;    CORONARY ARTERY BYPASS GRAFT (CABG) N/A 1/14/2020    Procedure: CORONARY ARTERY BYPASS GRAFT (CABG) x 1     Off Pump;  Surgeon: Huang Altamirano MD;  Location: [address];  Service: Cardiovascular;  Laterality: N/A;    INSERTION OF TUNNELED CENTRAL VENOUS HEMODIALYSIS CATHETER N/A 1/27/2020    Procedure: Insertion, Catheter, Central Venous, Hemodialysis;  Surgeon: ESTEBAN Gomez III, MD;  Location: [facility] CATH LAB;  Service: Peripheral Vascular;  Laterality: N/A;     Family History   Problem Relation Age of Onset    Anesthesia problems Neg Hx      Social History     Tobacco Use    Smoking status: Never Smoker    Smokeless tobacco: Never Used   Substance Use Topics    Alcohol use: No    Drug use: Yes     Types: Marijuana     Comment: used yesterday     Review of Systems   Constitutional: Negative for chills and fever.   HENT: Negative for congestion, ear pain, nosebleeds, rhinorrhea, sore throat and trouble swallowing.    Eyes: Negative for redness.   Respiratory: Negative for cough, shortness of breath and stridor.    Cardiovascular: Positive for leg swelling. Negative for chest pain.   Gastrointestinal: Negative for diarrhea, nausea and vomiting.   Genitourinary: Negative for decreased urine volume, dysuria, frequency, hematuria and urgency.   Musculoskeletal: Negative for back pain and neck pain.   Skin: Positive for wound. Negative for rash.   Neurological: Negative for dizziness, speech difficulty, weakness, light-headedness, numbness and headaches.   Psychiatric/Behavioral: Negative for confusion.       Physical Exam     Initial Vitals [08/12/20 1307]   BP Pulse Resp Temp SpO2   (!) 195/93 84 18 97.7 °F  (36.5 °C) 99 %      MAP       --         Physical Exam    Nursing note and vitals reviewed.  Constitutional: She appears well-developed and well-nourished.   HENT:   Head: Normocephalic.   Right Ear: External ear normal.   Left Ear: External ear normal.   Nose: Nose normal.   Mouth/Throat: Oropharynx is clear and moist.   Eyes: Conjunctivae are normal.   Cardiovascular: Normal rate and regular rhythm. Exam reveals no gallop and no friction rub.    No murmur heard.  Pulmonary/Chest: Breath sounds normal. She has no wheezes. She has no rhonchi. She has no rales.   Abdominal: Soft. Bowel sounds are normal. She exhibits no distension. There is no abdominal tenderness. There is no rebound, no guarding, no tenderness at McBurney's point and negative Shipman's sign.   Musculoskeletal: Normal range of motion.   Lymphadenopathy:     She has no cervical adenopathy.   Neurological: She is alert. She has normal strength. No cranial nerve deficit or sensory deficit.   Skin: Skin is warm and dry.   Psychiatric: She has a normal mood and affect.         ED Course   Procedures  Labs Reviewed   CBC W/ AUTO DIFFERENTIAL - Abnormal; Notable for the following components:       Result Value    RBC 3.02 (*)     Hemoglobin 9.4 (*)     Hematocrit 30.1 (*)     Mean Corpuscular Volume 100 (*)     Mean Corpuscular Hemoglobin 31.1 (*)     Mean Corpuscular Hemoglobin Conc 31.2 (*)     RDW 14.7 (*)     Platelets 406 (*)     Eos # 0.6 (*)     Lymph% 17.5 (*)     All other components within normal limits   COMPREHENSIVE METABOLIC PANEL - Abnormal; Notable for the following components:    Potassium 3.0 (*)     Glucose 199 (*)     BUN, Bld 28 (*)     Creatinine 3.0 (*)     Total Protein 8.9 (*)     Albumin 2.9 (*)     eGFR if  20 (*)     eGFR if non  17 (*)     All other components within normal limits   URINALYSIS, REFLEX TO URINE CULTURE - Abnormal; Notable for the following components:    Color, UA Colorless (*)      Protein, UA 2+ (*)     Glucose, UA 1+ (*)     All other components within normal limits    Narrative:     Specimen Source->Urine   URINALYSIS MICROSCOPIC    Narrative:     Specimen Source->Urine          Imaging Results    None          Medical Decision Making:   Initial Assessment:   54-year-old female sent by infectious disease for evaluation of acute kidney injury.  She is currently on Zosyn for chronic wound to the left heel with osteomyelitis.  She is being followed by vascular surgery and Infectious Disease.  Patient denies any fever, chest pain, shortness of breath, nausea, vomiting, diarrhea.  She does endorse decreased p.o. intake recently.  She began taking Lasix 4 days ago as previously prescribed for lower extremity edema.  Exam above.  Creatinine 3.0 from 1.8.  Not far from patient's baseline.  Discussed the patient with [facility] Medicine NP, who recommends IV fluids in ED and follow up in clinic.  Potassium p.o. given in the ED for hypokalemia.  Review of chart shows that infectious Disease has message there medical assistant to schedule follow-up appointment.  Will have her follow up with them in 1-2 days.  Discontinue Zosyn for now until further recommendations made by ID. Return to ER for worsening symptoms or as needed. Discussed with Dr. Mulligan who agrees with assessment and plan.                                  Clinical Impression:       ICD-10-CM ICD-9-CM   1. Elevated serum creatinine  R79.89 790.99             ED Disposition Condition    Discharge Stable        ED Prescriptions     None        Follow-up Information     Follow up With Specialties Details Why Contact Info    Erlinda Rahman NP Family Medicine Schedule an appointment as soon as possible for a visit in 2 days for follow up [address] [facility] [address]  [phone]      Lindsay De Anda MD Infectious Diseases   [address] [facility] [address]  [phone]      [facility] Go to  As needed, If symptoms worsen [address]  [facility] [id number] [phone]                                     Theodora Espino PA-C  08/13/20 1910

## 2020-08-12 NOTE — ED TRIAGE NOTES
Pt reports to ED after being sent from nephrologist for abnormal lab values. Pt is in stage 3 chronic kidney, is receiving IV antibiotics at home through pic line for osteomyelitis of L foot. Pt is scheduled to receive a fempop bypass in L leg next week. MD called pt to f/u for high kidney labs prior to surgery. Pt denies other c/o.

## 2020-08-12 NOTE — DISCHARGE INSTRUCTIONS
Your procedure  is scheduled for __8/18/2020________.    Call [phone] between 2pm and 5pm on _8/17/2020______to find out your arrival time for the day of surgery.    Report to the Emergency Department on the day of your surgery.  You will be escorted to the admitting unit.    Important instructions:   Do not eat or drink after 12 midnight, including water.  It is okay to brush your teeth.  Do not have gum, candy or mints.     Take only these medications with a small swallow of water on the morning of your surgery _   Amiodarone, carvedilol, lexapro, gabapentin, hydralazine_____________    Do not take any diabetic medication on the morning of surgery unless instructed to do so by your doctor or pre op nurse.        Return to patient registration through the Emergency Room as previously on__8/17/2020 at 11:00 am_ for  Covid test.       Please shower the night before and the morning of your surgery.        Use Hibiclens soap as instructed by your pre op nurse.   Please place clean linens on your bed the night before surgery. Please wear fresh clean clothing after each shower.     No shaving of procedural area at least 4-5 days before surgery due to increased risk of skin irritation and/or possible infection.      You may be asked to take a third shower on arrival to Same Day Surgery depending on the type of surgery you are having.     Do not wear make- up, including mascara.     You may wear deodorant only.      Do not wear powder, body lotion or perfume/cologne.     Do not wear any jewelry or have any metal on your body.     You will be asked to remove any dentures or partials for the procedure.     Please bring any documents given to you by your doctor.     If you are going home on the same day of surgery, you must arrange for a family member or a friend to drive you home.  Public transportation is prohibited.  You will not be able to drive home if you were given anesthesia or sedation.     Wear loose  fitting clothes allowing for bandages.     Please leave money and valuables home.       You may bring your cell phone.     Call the doctor if fever or illness should occur before your surgery.    Call [phone] to contact us here if needed.

## 2020-08-12 NOTE — TELEPHONE ENCOUNTER
Labs collected on 8/11/2020 are in the media tab.  Phill with [employer] called with [facility] labs.  Currently on Zosyn 4.5g Q8  Per Dr De Anda's recommendation, pt needs to go to the ED ASAP.  Called pt to inform about the doctors recommendations, pt verbalized understanding. Stated she will go to the ED today.  Called Phill to informed.

## 2020-08-12 NOTE — PROGRESS NOTES
[facility]                Progress Note    Subjective:       Patient ID: Suyapa Connelly is a 54 y.o. female.    Chief Complaint: Wound Check    F/u wound care and HBO evaluation. Patient ambulatory with no c/o pain at present. Denies fever, cough, chills or SOB. Wound dressing to left foot dry and intact. Dressing removed and noted moderate amount of brownish drainage to wound. HBOT and TCom explained and discussed with pt and all questions answered. Pt states she is scheduled for fem pop bypass on 8/18/2020. 4% lidocaine applied to wound. Wound debridement done, tolerated well. Patient to RTC on 8/26/2020 for wound care and tcom.     This is an established patient in wound care.   Patient presents in the office today for evaluation of the chronic wound.  Updated HPI is noted below.    The periwound appears non-erythematous, macerated, non-edematous    The wound appears stable; necrotic tissue noted    Patient denies fever, chills    Patients reports wound pain    Lymphedema status is noncontributory to wound status    Pain at the site of the wound is n/a     Contributing factors to current wound state include numerous comorbid conditions, Diabetes Type 2, off-loading limitations, PVD    Review of Systems   Constitutional: Negative for activity change, appetite change and fever.   HENT: Negative for congestion.    Respiratory: Negative for shortness of breath and wheezing.    Cardiovascular: Negative for chest pain and leg swelling.   Gastrointestinal: Negative for abdominal pain, nausea and vomiting.   Skin: Positive for wound.   Neurological: Negative for dizziness and headaches.   Psychiatric/Behavioral: The patient is not nervous/anxious.          Objective:        Physical Exam  Vitals signs and nursing note reviewed.   Constitutional:       General: She is not in acute distress.     Appearance: She is well-developed.   HENT:      Head: Normocephalic and  atraumatic.   Eyes:      Conjunctiva/sclera: Conjunctivae normal.   Neck:      Musculoskeletal: Normal range of motion.   Pulmonary:      Effort: Pulmonary effort is normal.   Abdominal:      General: There is no distension.   Musculoskeletal: Normal range of motion.   Skin:     General: Skin is warm.      Comments: See wound description   Neurological:      Mental Status: She is alert and oriented to person, place, and time.   Psychiatric:         Behavior: Behavior normal.         Thought Content: Thought content normal.         Judgment: Judgment normal.         Vitals:    08/12/20 1030   BP: (!) 176/74   Pulse: 65   Resp: 20   Temp: 97.7 °F (36.5 °C)       Assessment:           ICD-10-CM ICD-9-CM   1. Type 2 diabetes mellitus without complication, with long-term current use of insulin  E11.9 250.00    Z79.4 V58.67   2. Non healing left heel wound  S91.302A 892.1            Wound 08/07/20 1342 Diabetic Ulcer Left Heel (Active)   08/07/20 1342    Pre-existing: Yes   Primary Wound Type: Diabetic ulc   Side: Left   Orientation:    Location: Heel   Wound Number (optional):    Ankle-Brachial Index:    Pulses:    Removal Indication and Assessment:    Wound Outcome:    (Retired) Wound Type:    (Retired) Wound Length (cm):    (Retired) Wound Width (cm):    (Retired) Depth (cm):    Wound Description (Comments):    Removal Indications:    Wound Image   08/12/20 1000   Wound WDL ex 08/12/20 1000   Dressing Appearance Intact;Dried drainage 08/12/20 1000   Drainage Amount Moderate 08/12/20 1000   Drainage Characteristics/Odor Brown 08/12/20 1000   Appearance Pink;Red;Yellow 08/12/20 1000   Tissue loss description Full thickness 08/12/20 1000   Black (%), Wound Tissue Color 0 % 08/12/20 1000   Red (%), Wound Tissue Color 20 % 08/12/20 1000   Yellow (%), Wound Tissue Color 80 % 08/12/20 1000   Periwound Area Intact;Dry 08/12/20 1000   Wound Edges Rolled/closed 08/12/20 1000   Wound Length (cm) 4.6 cm 08/12/20 1000   Wound Width  (cm) 5 cm 08/12/20 1000   Wound Depth (cm) 0.9 cm 08/12/20 1000   Wound Volume (cm^3) 20.7 cm^3 08/12/20 1000   Wound Surface Area (cm^2) 23 cm^2 08/12/20 1000   Care Cleansed with:;Soap and water;Sterile normal saline 08/12/20 1000   Dressing Applied 08/12/20 1000   Off Loading Football dressing;Off loading shoe 08/12/20 1000   Dressing Change Due 08/21/20 08/12/20 1000           Debridement    Date/Time: 8/12/2020 8:43 PM  Performed by: Colleen Cortes MD  Authorized by: Colleen Cortes MD     Consent Done?:  Yes (Verbal)  Local anesthesia used?: Yes    Local anesthetic:  Topical anesthetic    Wound Details:    Location:  Left foot    Location:  Left Heel    Type of Debridement:  Excisional       Length (cm):  4.5       Area (sq cm):  22.5       Width (cm):  5       Percent Debrided (%):  50       Depth (cm):  0.9       Total Area Debrided (sq cm):  11.25    Depth of debridement:  Subcutaneous tissue    Tissue debrided:  Subcutaneous    Devitalized tissue debrided:  Slough and Fibrin    Instruments:  Curette    Bleeding:  Minimal  Hemostasis Achieved: Yes    Method Used:  Pressure  Patient tolerance:  Patient tolerated the procedure well with no immediate complications        Plan:            1. Debridement needed and Done today.   2. Patient will need TCOM after revascularization by Dr. Huber - scheduled for next week  3. Will evaluate for HBO after TCOM and revascularization   4. Patient will need blood work - prealbumin   5. Keep dressing clean and intact  6. Continue with wound care orders and plan as noted in orders.   7. Continue to follow current medication regimen as per pcp   8. Call for any questions / concerns.         Orders Placed This Encounter   Procedures    Change dressing     Clean wound with NS. Apply Iodoflex to wound bed, cover with mextra. Football drsg (cast padding x3, stockinet ONLY).        Follow up in about 2 weeks (around 8/26/2020) for wound care/tcom.

## 2020-08-12 NOTE — FIRST PROVIDER EVALUATION
Emergency Department TeleTRIAGE Encounter Note      CHIEF COMPLAINT    Chief Complaint   Patient presents with    Abnormal Lab     Pt was contacted but PCP to seek ER for follow up blood work secondary to abnormal CR & BUN labs. No complaints denies any symptoms at this time.        VITAL SIGNS   Initial Vitals [08/12/20 1307]   BP Pulse Resp Temp SpO2   (!) 195/93 84 18 97.7 °F (36.5 °C) 99 %      MAP       --            ALLERGIES    Review of patient's allergies indicates:   Allergen Reactions    Ciprofloxacin      Itchiness    Contrast media      Kidney injury       PROVIDER TRIAGE NOTE  53 y/o female which presents with elevated kidney levels per there PCP. Patient is currently getting Abx via a PICC line for foot infection. Pt has no complaints.       ORDERS  Labs Reviewed - No data to display    ED Orders (720h ago, onward)    Start Ordered     Status Ordering Provider    08/12/20 1406 08/12/20 1405  Insert peripheral IV  Continuous      Ordered YRNSUSAN DUKE    08/12/20 1406 08/12/20 1405  CBC auto differential  STAT      Ordered SUSAN POOL    08/12/20 1406 08/12/20 1405  Comprehensive metabolic panel  STAT      Ordered YRNSUSAN DUKE            Virtual Visit Note: The provider triage portion of this emergency department evaluation and documentation was performed via [employer], a HIPAA-compliant telemedicine application, in concert with a tele-presenter in the room. A face to face patient evaluation with one of my colleagues will occur once the patient is placed in an emergency department room.      DISCLAIMER: This note was prepared with [employer] DX voice recognition transcription software. Garbled syntax, mangled pronouns, and other bizarre constructions may be attributed to that software system.

## 2020-08-13 ENCOUNTER — TELEPHONE (OUTPATIENT)
Dept: VASCULAR SURGERY | Facility: CLINIC | Age: 55
End: 2020-08-13

## 2020-08-13 ENCOUNTER — TELEPHONE (OUTPATIENT)
Dept: INFECTIOUS DISEASES | Facility: CLINIC | Age: 55
End: 2020-08-13

## 2020-08-13 NOTE — ED NOTES
Report received from DEANGELO Kerr; pt resting calmly awake in bed; no acute distress noted; will continue to monitor

## 2020-08-13 NOTE — TELEPHONE ENCOUNTER
Faxed over arterial u/s, ATTILA and op note from angiogram to try and get approval from the insurance company.

## 2020-08-13 NOTE — ED NOTES
Pt reporting that IV in left AC is hurting and insisting on having it taken out; IV assessed and flushed normally but pt still insists on having it taken out because she states that it is hurting her arm; MD notified and states IV ok to take out if PICC line is working properly; PICC line assessed, blood draws back and flushes appropriately; IV taken out of pt's left arm

## 2020-08-13 NOTE — ED NOTES
Pt AAOx4; pt ambulates with steady gait, no assist needed, walking boot in place; pt very pleasant and satisfied with care; pt's mother providing pt ride home

## 2020-08-13 NOTE — DISCHARGE INSTRUCTIONS
STOP Jeison. Contact Dr. De Anda tomorrow to schedule follow up appointment because he wants to change your antibiotics. Return to ER for worsening symptoms or as needed.

## 2020-08-13 NOTE — TELEPHONE ENCOUNTER
----- Message from Earnestine Coleman sent at 8/13/2020 10:12 AM CDT -----  Regarding: ER Discharge  Contact: PT  PT called to update Adilson and staff that the er discharged her from last night and told her to call us and let us know to change her antibiotics based on taking it at home - one of the antibiotics was causing her liver to be come elevated.     Callback: [phone]

## 2020-08-14 ENCOUNTER — ANESTHESIA EVENT (OUTPATIENT)
Dept: SURGERY | Facility: HOSPITAL | Age: 55
DRG: 253 | End: 2020-08-14
Payer: MEDICAID

## 2020-08-14 ENCOUNTER — OFFICE VISIT (OUTPATIENT)
Dept: INFECTIOUS DISEASES | Facility: CLINIC | Age: 55
End: 2020-08-14
Payer: MEDICAID

## 2020-08-14 ENCOUNTER — INFUSION (OUTPATIENT)
Dept: INFECTIOUS DISEASES | Facility: HOSPITAL | Age: 55
End: 2020-08-14
Attending: INTERNAL MEDICINE
Payer: MEDICAID

## 2020-08-14 VITALS
BODY MASS INDEX: 32.83 KG/M2 | DIASTOLIC BLOOD PRESSURE: 89 MMHG | WEIGHT: 197.31 LBS | HEART RATE: 89 BPM | TEMPERATURE: 99 F | SYSTOLIC BLOOD PRESSURE: 182 MMHG

## 2020-08-14 DIAGNOSIS — M86.172 ACUTE OSTEOMYELITIS OF LEFT CALCANEUS: ICD-10-CM

## 2020-08-14 DIAGNOSIS — Z79.2 RECEIVING INTRAVENOUS ANTIBIOTIC TREATMENT AT HOME: ICD-10-CM

## 2020-08-14 DIAGNOSIS — N17.9 ACUTE KIDNEY INJURY: ICD-10-CM

## 2020-08-14 DIAGNOSIS — I73.9 PERIPHERAL VASCULAR DISEASE: ICD-10-CM

## 2020-08-14 DIAGNOSIS — Z45.2 PICC (PERIPHERALLY INSERTED CENTRAL CATHETER) REMOVAL: ICD-10-CM

## 2020-08-14 DIAGNOSIS — Z78.9 HEALTH CARE HOME, ACTIVE CARE COORDINATION: ICD-10-CM

## 2020-08-14 DIAGNOSIS — Z51.81 THERAPEUTIC DRUG MONITORING: ICD-10-CM

## 2020-08-14 DIAGNOSIS — N17.9 ACUTE KIDNEY INJURY: Primary | ICD-10-CM

## 2020-08-14 DIAGNOSIS — N18.30 STAGE 3 CHRONIC KIDNEY DISEASE: ICD-10-CM

## 2020-08-14 LAB
ANION GAP SERPL CALC-SCNC: 12 MMOL/L (ref 8–16)
BUN SERPL-MCNC: 25 MG/DL (ref 6–20)
CALCIUM SERPL-MCNC: 8.1 MG/DL (ref 8.7–10.5)
CHLORIDE SERPL-SCNC: 107 MMOL/L (ref 95–110)
CO2 SERPL-SCNC: 24 MMOL/L (ref 23–29)
CREAT SERPL-MCNC: 2.3 MG/DL (ref 0.5–1.4)
EST. GFR  (AFRICAN AMERICAN): 27.2 ML/MIN/1.73 M^2
EST. GFR  (NON AFRICAN AMERICAN): 23.6 ML/MIN/1.73 M^2
GLUCOSE SERPL-MCNC: 147 MG/DL (ref 70–110)
POTASSIUM SERPL-SCNC: 3.1 MMOL/L (ref 3.5–5.1)
SODIUM SERPL-SCNC: 143 MMOL/L (ref 136–145)

## 2020-08-14 PROCEDURE — 36592 COLLECT BLOOD FROM PICC: CPT

## 2020-08-14 PROCEDURE — 80048 BASIC METABOLIC PNL TOTAL CA: CPT

## 2020-08-14 PROCEDURE — 99213 OFFICE O/P EST LOW 20 MIN: CPT | Mod: PBBFAC,25 | Performed by: INTERNAL MEDICINE

## 2020-08-14 PROCEDURE — 99999 PR PBB SHADOW E&M-EST. PATIENT-LVL III: ICD-10-PCS | Mod: PBBFAC,,, | Performed by: INTERNAL MEDICINE

## 2020-08-14 PROCEDURE — 99999 PR PBB SHADOW E&M-EST. PATIENT-LVL III: CPT | Mod: PBBFAC,,, | Performed by: INTERNAL MEDICINE

## 2020-08-14 PROCEDURE — 99215 OFFICE O/P EST HI 40 MIN: CPT | Mod: S$PBB,,, | Performed by: INTERNAL MEDICINE

## 2020-08-14 PROCEDURE — 99215 PR OFFICE/OUTPT VISIT, EST, LEVL V, 40-54 MIN: ICD-10-PCS | Mod: S$PBB,,, | Performed by: INTERNAL MEDICINE

## 2020-08-14 RX ORDER — AMOXICILLIN AND CLAVULANATE POTASSIUM 875; 125 MG/1; MG/1
1 TABLET, FILM COATED ORAL 2 TIMES DAILY
Qty: 28 TABLET | Refills: 0 | Status: CANCELLED | OUTPATIENT
Start: 2020-08-14 | End: 2020-08-28

## 2020-08-14 RX ORDER — AMOXICILLIN AND CLAVULANATE POTASSIUM 500; 125 MG/1; MG/1
1 TABLET, FILM COATED ORAL 2 TIMES DAILY
Qty: 32 TABLET | Refills: 0 | Status: SHIPPED | OUTPATIENT
Start: 2020-08-14 | End: 2020-08-30

## 2020-08-14 NOTE — PATIENT INSTRUCTIONS
Instructed to keep dressing in place for twenty four hours, Instructed to monitor for s/s of infections which includes, but is not limited to; fever greater than 100.4, redness, swelling/drainage to site, or any pain that is not relieved, Please call [phone] with any questions/concerns.

## 2020-08-14 NOTE — PROGRESS NOTES
INFECTIOUS DISEASE CLINIC  08/14/2020 1:30 PM    Subjective:      Chief Complaint: hospital follow up     History of Present Illness:    Patient Suyapa Connelly is a 54 y.o. female with CAD, HF-EF 35%, PAD, s/p bilateral lower extremity stents, DM 2,  Anxiety, depression, and chronic open wound of the left heel who presents today for f/u osteomyelitis. She was recently admitted for NSTEMI s/p stent and chronic L heel wound. MRI with osteo of calcaneous. She had bone biopsy with podiatry that grew enterococcus, GBS, and enterobacter. Reperfusion delayed until outpatient given recent cardiac stent. Pt was discharged on zosyn. Denies issues with picc line. Over past week had acute jump in Cr. Was seen in ED and given IVF and IV antibiotics were stopped. Of note, pt reports worsening swelling in L leg recently and increased doses of torosamide to 20mg BID. Denies f/c        Creatinine 0.5 - 1.4 mg/dL 3.0High   1.8High   1.8High                     Review of Symptoms:  Constitutional: Denies fevers, chills, or weakness.  ENT: Denies dysphagia, nasal discharge, ear pain or discharge.  Cardiovascular: Denies chest pain, palpitations, orthopnea, or claudication.  Respiratory: Denies shortness of breath, cough, hemoptysis, or wheezing.  GI: Denies nausea/vomitting, hematochezia, melena, abd pain, or changes in appetite.  : Denies dysuria, incontinence, or hematuria.  Musculoskeletal: Denies joint pain or myalgias.  Skin/breast: Denies rashes, lumps, lesions, or discharge.  Neurologic: Denies headache, dizziness, vertigo, or paresthesias.    Past Medical History:   Diagnosis Date    Anxiety     Depression     Diabetes mellitus     type 2    Diabetes mellitus, type 2     GERD (gastroesophageal reflux disease)     Hyperlipemia     Hypertension     Myocardial infarction 2010    minor-caused by stress per pt.       Past Surgical History:   Procedure Laterality Date    Angiogram - Right Extremity Right 7/9/15     angiogram-left leg  10/6/15    CATHETERIZATION OF BOTH LEFT AND RIGHT HEART N/A 12/18/2019    Procedure: CATHETERIZATION, HEART, BOTH LEFT AND RIGHT;  Surgeon: Que Fernando III, MD;  Location: [facility] CATH LAB;  Service: Cardiology;  Laterality: N/A;    CORONARY ANGIOGRAPHY N/A 12/18/2019    Procedure: ANGIOGRAM, CORONARY ARTERY;  Surgeon: Que Fernando III, MD;  Location: [facility] CATH LAB;  Service: Cardiology;  Laterality: N/A;    CORONARY ANGIOGRAPHY INCLUDING BYPASS GRAFTS WITH CATHETERIZATION OF LEFT HEART N/A 7/28/2020    Procedure: ANGIOGRAM, CORONARY, INCLUDING BYPASS GRAFT, WITH LEFT HEART CATHETERIZATION, 9 am;  Surgeon: Rachel Easley MD;  Location: [facility] CATH LAB;  Service: Cardiology;  Laterality: N/A;    CORONARY ARTERY BYPASS GRAFT (CABG) N/A 1/14/2020    Procedure: CORONARY ARTERY BYPASS GRAFT (CABG) x 1     Off Pump;  Surgeon: Huang Altamirano MD;  Location: [address];  Service: Cardiovascular;  Laterality: N/A;    INSERTION OF TUNNELED CENTRAL VENOUS HEMODIALYSIS CATHETER N/A 1/27/2020    Procedure: Insertion, Catheter, Central Venous, Hemodialysis;  Surgeon: ESTEBAN Gomez III, MD;  Location: [facility] CATH LAB;  Service: Peripheral Vascular;  Laterality: N/A;       Family History   Problem Relation Age of Onset    Anesthesia problems Neg Hx        Social History     Socioeconomic History    Marital status: Legally      Spouse name: Not on file    Number of children: Not on file    Years of education: Not on file    Highest education level: Not on file   Occupational History    Not on file   Social Needs    Financial resource strain: Not on file    Food insecurity     Worry: Not on file     Inability: Not on file    Transportation needs     Medical: Not on file     Non-medical: Not on file   Tobacco Use    Smoking status: Never Smoker    Smokeless tobacco: Never Used   Substance and Sexual Activity    Alcohol use: No    Drug use: Yes     Types: Marijuana      Comment: used yesterday    Sexual activity: Not on file   Lifestyle    Physical activity     Days per week: Not on file     Minutes per session: Not on file    Stress: Not on file   Relationships    Social connections     Talks on phone: Not on file     Gets together: Not on file     Attends L[employer]-day service: Not on file     Active member of club or organization: Not on file     Attends meetings of clubs or organizations: Not on file     Relationship status: Not on file   Other Topics Concern    Not on file   Social History Narrative    Not on file       Review of patient's allergies indicates:   Allergen Reactions    Ciprofloxacin      Itchiness    Contrast media      Kidney injury         Objective:   VS (24h):   Vitals:    08/14/20 1317   BP: (!) 182/89   Pulse: 89   Temp: 98.6 °F (37 °C)     [unfilled]  General: Afebrile, alert, comfortable, no acute distress.   HEENT: KYLIE. EOMI, no scleral icterus.   Pulmonary: Non labored,clear to auscultation A/P/L.   Cardiac: normal S1 & S2 w/o rubs/murmurs/gallops.   Abdominal: Non-tender, non-distended.  Extremities: Moves all extremities x 4. Trace edema BLE  Skin: see photo  Neurological:  Alert and oriented x 4.             Labs:  Glucose   Date Value Ref Range Status   08/12/2020 199 (H) 70 - 110 mg/dL Final   07/30/2020 158 (H) 70 - 110 mg/dL Final   07/29/2020 241 (H) 70 - 110 mg/dL Final     Calcium   Date Value Ref Range Status   08/12/2020 8.9 8.7 - 10.5 mg/dL Final   07/30/2020 8.2 (L) 8.7 - 10.5 mg/dL Final   07/29/2020 8.6 (L) 8.7 - 10.5 mg/dL Final     Albumin   Date Value Ref Range Status   08/12/2020 2.9 (L) 3.5 - 5.2 g/dL Final   07/29/2020 2.3 (L) 3.5 - 5.2 g/dL Final   07/22/2020 2.1 (L) 3.5 - 5.2 g/dL Final     Total Protein   Date Value Ref Range Status   08/12/2020 8.9 (H) 6.0 - 8.4 g/dL Final   07/17/2020 8.5 (H) 6.0 - 8.4 g/dL Final   03/23/2020 7.2 6.0 - 8.4 g/dL Final     Sodium   Date Value Ref Range Status   08/12/2020 141 136 - 145  mmol/L Final   07/30/2020 136 136 - 145 mmol/L Final   07/29/2020 133 (L) 136 - 145 mmol/L Final     Potassium   Date Value Ref Range Status   08/12/2020 3.0 (L) 3.5 - 5.1 mmol/L Final     Comment:     Specimen slightly hemolyzed   07/30/2020 4.4 3.5 - 5.1 mmol/L Final   07/29/2020 4.3 3.5 - 5.1 mmol/L Final     CO2   Date Value Ref Range Status   08/12/2020 25 23 - 29 mmol/L Final   07/30/2020 23 23 - 29 mmol/L Final   07/29/2020 24 23 - 29 mmol/L Final     Chloride   Date Value Ref Range Status   08/12/2020 101 95 - 110 mmol/L Final   07/30/2020 108 95 - 110 mmol/L Final   07/29/2020 103 95 - 110 mmol/L Final     BUN, Bld   Date Value Ref Range Status   08/12/2020 28 (H) 6 - 20 mg/dL Final   07/30/2020 30 (H) 6 - 20 mg/dL Final   07/29/2020 31 (H) 6 - 20 mg/dL Final     Creatinine   Date Value Ref Range Status   08/12/2020 3.0 (H) 0.5 - 1.4 mg/dL Final   07/30/2020 1.8 (H) 0.5 - 1.4 mg/dL Final   07/29/2020 1.8 (H) 0.5 - 1.4 mg/dL Final     Alkaline Phosphatase   Date Value Ref Range Status   08/12/2020 114 55 - 135 U/L Final   07/17/2020 115 55 - 135 U/L Final   03/23/2020 62 55 - 135 U/L Final     ALT   Date Value Ref Range Status   08/12/2020 10 10 - 44 U/L Final   07/17/2020 <5 (L) 10 - 44 U/L Final   03/23/2020 16 10 - 44 U/L Final     AST   Date Value Ref Range Status   08/12/2020 16 10 - 40 U/L Final   07/17/2020 11 10 - 40 U/L Final   03/23/2020 23 10 - 40 U/L Final     Total Bilirubin   Date Value Ref Range Status   08/12/2020 0.3 0.1 - 1.0 mg/dL Final     Comment:     For infants and newborns, interpretation of results should be based  on gestational age, weight and in agreement with clinical  observations.  Premature Infant recommended reference ranges:  Up to 24 hours.............<8.0 mg/dL  Up to 48 hours............<12.0 mg/dL  3-5 days..................<15.0 mg/dL  6-29 days.................<15.0 mg/dL     07/17/2020 0.5 0.1 - 1.0 mg/dL Final     Comment:     For infants and newborns, interpretation  of results should be based  on gestational age, weight and in agreement with clinical  observations.  Premature Infant recommended reference ranges:  Up to 24 hours.............<8.0 mg/dL  Up to 48 hours............<12.0 mg/dL  3-5 days..................<15.0 mg/dL  6-29 days.................<15.0 mg/dL     03/23/2020 0.4 0.1 - 1.0 mg/dL Final     Comment:     For infants and newborns, interpretation of results should be based  on gestational age, weight and in agreement with clinical  observations.  Premature Infant recommended reference ranges:  Up to 24 hours.............<8.0 mg/dL  Up to 48 hours............<12.0 mg/dL  3-5 days..................<15.0 mg/dL  6-29 days.................<15.0 mg/dL       WBC   Date Value Ref Range Status   08/12/2020 8.04 3.90 - 12.70 K/uL Final   07/29/2020 13.03 (H) 3.90 - 12.70 K/uL Final   07/26/2020 9.49 3.90 - 12.70 K/uL Final     Hemoglobin   Date Value Ref Range Status   08/12/2020 9.4 (L) 12.0 - 16.0 g/dL Final   07/29/2020 10.0 (L) 12.0 - 16.0 g/dL Final   07/26/2020 9.4 (L) 12.0 - 16.0 g/dL Final     POC Hematocrit   Date Value Ref Range Status   01/21/2020 23 (L) 36 - 54 %PCV Final   01/20/2020 24 (L) 36 - 54 %PCV Final   01/19/2020 21 (L) 36 - 54 %PCV Final     Hematocrit   Date Value Ref Range Status   08/12/2020 30.1 (L) 37.0 - 48.5 % Final   07/29/2020 31.7 (L) 37.0 - 48.5 % Final   07/26/2020 29.5 (L) 37.0 - 48.5 % Final     Mean Corpuscular Volume   Date Value Ref Range Status   08/12/2020 100 (H) 82 - 98 fL Final   07/29/2020 96 82 - 98 fL Final   07/26/2020 98 82 - 98 fL Final     Platelets   Date Value Ref Range Status   08/12/2020 406 (H) 150 - 350 K/uL Final   07/29/2020 378 (H) 150 - 350 K/uL Final   07/26/2020 362 (H) 150 - 350 K/uL Final     Lab Results   Component Value Date    CHOL 246 (H) 01/03/2020    CHOL 244 (H) 12/16/2019     Lab Results   Component Value Date    HDL 41 01/03/2020    HDL 46 12/16/2019     Lab Results   Component Value Date     LDLCALC 182.0 (H) 01/03/2020    LDLCALC 171.0 (H) 12/16/2019     Lab Results   Component Value Date    TRIG 115 01/03/2020    TRIG 135 12/16/2019     Lab Results   Component Value Date    CHOLHDL 16.7 (L) 01/03/2020    CHOLHDL 18.9 (L) 12/16/2019     No results found for: RPR  No results found for: QUANTIFERON    Medications:  Current Outpatient Medications on File Prior to Visit   Medication Sig Dispense Refill    acetaminophen (TYLENOL) 500 MG tablet Take 1,000 mg by mouth daily as needed for Pain.      allopurinoL (ZYLOPRIM) 100 MG tablet Take 1 tablet (100 mg total) by mouth once daily. 30 tablet 5    amiodarone (PACERONE) 400 MG tablet Take 1 tablet (400 mg total) by mouth once daily. 30 tablet 11    aspirin 81 MG Chew Take 1 tablet (81 mg total) by mouth once daily. 90 tablet 3    atorvastatin (LIPITOR) 80 MG tablet Take 1 tablet (80 mg total) by mouth once daily. 30 tablet 5    blood sugar diagnostic (ONETOUCH ULTRA BLUE TEST STRIP) Strp Use to test blood glucose twice daily (Patient taking differently: Tests 3 times daily) 100 each 11    carvediloL (COREG) 6.25 MG tablet Take 6.25 mg by mouth once daily. Pt thinks she take BID. Unsure.      clopidogreL (PLAVIX) 75 mg tablet Take 1 tablet (75 mg total) by mouth once daily. 30 tablet 11    escitalopram oxalate (LEXAPRO) 10 MG tablet Take 1 tablet (10 mg total) by mouth once daily. 30 tablet 5    hydrALAZINE (APRESOLINE) 25 MG tablet Take 1 tablet (25 mg total) by mouth every 8 (eight) hours. 90 tablet 5    insulin aspart U-100 (NOVOLOG) 100 unit/mL injection Inject 9 Units into the skin 3 (three) times daily with meals. 10 mL 5    lancets 30 gauge Misc use to test blood glucose 2 (two) times daily with meals. 100 each 11    multivitamin (THERAGRAN) per tablet Take 1 tablet by mouth once daily.      ondansetron (ZOFRAN-ODT) 4 MG TbDL Take 1 tablet (4 mg total) by mouth every 12 (twelve) hours as needed (vomiting). 10 tablet 0     oxyCODONE-acetaminophen (PERCOCET) 5-325 mg per tablet Take 1 tablet by mouth every 6 (six) hours as needed for Pain. 28 tablet 0    piperacillin sodium/tazobactam (PIPERACILLIN-TAZOBACTAM 4.5G/100ML SODIUM CHLORIDE 0.9%-READY TO MIX) Inject 100 mLs (4.5 g total) into the vein every 8 (eight) hours.      sevelamer carbonate (RENVELA) 800 mg Tab Take 1 tablet (800 mg total) by mouth 3 (three) times daily with meals. 90 tablet 11    torsemide (DEMADEX) 20 MG Tab Take 20 mg by mouth once daily.       gabapentin (NEURONTIN) 300 MG capsule Take 1 capsule (300 mg total) by mouth 3 (three) times daily. 90 capsule 5    insulin glargine 100 units/mL (3mL) SubQ pen Inject 10 Units into the skin every evening. (Patient taking differently: Inject 10 Units into the skin every evening. Lantus) 9 mL 1    [DISCONTINUED] lancing device Misc 1 Device by Misc.(Non-Drug; Combo Route) route 2 (two) times daily with meals. 1 each 0     No current facility-administered medications on file prior to visit.        Antibiotics:   Antibiotics (From admission, onward)    None          HIV: No components found for: HIV 1/2 AG/AB  Hepatitis C IgG: No components found for: HEPATITIS C  Syphilis: No results found for: RPR    Hepatitis A IgG: No components found for: HEPATITIS A IGG  Hepatitis Bc IgG: No components found for: HEPATITIS B CORE IGG  Hepatitis Bs IgG:  Quantiferon: No results found for: QUANTIFERON  VZV IgG: No components found for: VARICELLA IGG    No components found for: SEDIMENTATION RATE  No components found for: C-REACTIVE PROTEIN      Microbiology x 7d:   Microbiology Results (last 7 days)     ** No results found for the last 168 hours. **            There is no immunization history on file for this patient.      Assessment:     54F with h/o CAD/PAD with recent cardiac stent with plans for outpatient revascularization of lower extremity in next few weeks. Patient with osteomyelitis of L foot based on positive bone cultures  growing e.faecalis, group B strep and enterobacter. Note path with viable bone without inflammatory infiltrates or other significant histopathologic abnormalities. Some clinical improvement on zosyn. Outpatient iv antibiotic course c/b acute kidney injury; unclear if from zosyn or diuretics (was taking 20mg bid torosamide).  Healing wound will depend on adequate reperfusion. Creatinine improving since stopping zosyn    Plan:     Pull picc line. Given she has already completed 4 weeks of IV antibiotics for osteo (based on bone cultures, negative path) and is now having complications from iv antibiotics, will stop and pull line. Start po augmentin, ~2 weeks    -Ultimately needs adequate blood flow to heal wound and sounds like there are outpatient plans for revascularization in near future.      Continue podiatry f/u    Advised her to reach out to cardiologist to discuss diuretic dosing and monitoring kidney function      Lindsay De Anda MD, MPH  Infectious Disease

## 2020-08-14 NOTE — PROGRESS NOTES
Picc line noted to RUE. Labs obtained using aseptic technique. PICC line removed from RUE after labs obtained. PICC catheter tip visualized and intact. Pressure dressing applied with Vaseline gauze, 2x2 gauze and Coban. No redness, ecchymosis, edema, swelling, or drainage noted at site. Monitored for 30 minutes. Pt left in NAD. Instructions provided on post PICC discharge care, including followup notification instructions.

## 2020-08-17 ENCOUNTER — TELEPHONE (OUTPATIENT)
Dept: VASCULAR SURGERY | Facility: CLINIC | Age: 55
End: 2020-08-17

## 2020-08-17 ENCOUNTER — HOSPITAL ENCOUNTER (OUTPATIENT)
Dept: PREADMISSION TESTING | Facility: HOSPITAL | Age: 55
Discharge: HOME | DRG: 253 | End: 2020-08-17
Attending: SURGERY
Payer: MEDICAID

## 2020-08-17 DIAGNOSIS — Z01.818 PREOP TESTING: ICD-10-CM

## 2020-08-17 LAB
ABO + RH BLD: NORMAL
BLD GP AB SCN CELLS X3 SERPL QL: NORMAL
SARS-COV-2 RDRP RESP QL NAA+PROBE: NEGATIVE

## 2020-08-17 PROCEDURE — 86901 BLOOD TYPING SEROLOGIC RH(D): CPT

## 2020-08-17 PROCEDURE — U0002 COVID-19 LAB TEST NON-CDC: HCPCS

## 2020-08-17 PROCEDURE — 36415 COLL VENOUS BLD VENIPUNCTURE: CPT

## 2020-08-17 NOTE — TELEPHONE ENCOUNTER
Call to patient to let her know that we have been in contact with her insurance today to get her surgery approved for tomorrow. Explained that we would call her either way. She stated understanding.

## 2020-08-17 NOTE — TELEPHONE ENCOUNTER
Call to patient to let her know that her surgery was approved and someone from the hospital would be calling with a arrival time. She stated understanding.

## 2020-08-18 ENCOUNTER — ANESTHESIA (OUTPATIENT)
Dept: SURGERY | Facility: HOSPITAL | Age: 55
DRG: 253 | End: 2020-08-18
Payer: MEDICAID

## 2020-08-18 ENCOUNTER — TELEPHONE (OUTPATIENT)
Dept: VASCULAR SURGERY | Facility: CLINIC | Age: 55
End: 2020-08-18

## 2020-08-18 ENCOUNTER — HOSPITAL ENCOUNTER (INPATIENT)
Facility: HOSPITAL | Age: 55
LOS: 6 days | Discharge: HOME | DRG: 253 | End: 2020-08-24
Attending: SURGERY | Admitting: SURGERY
Payer: MEDICAID

## 2020-08-18 DIAGNOSIS — Z79.4 TYPE 2 DIABETES MELLITUS WITHOUT COMPLICATION, WITH LONG-TERM CURRENT USE OF INSULIN: ICD-10-CM

## 2020-08-18 DIAGNOSIS — S91.302A NON HEALING LEFT HEEL WOUND: ICD-10-CM

## 2020-08-18 DIAGNOSIS — N18.30 STAGE 3 CHRONIC KIDNEY DISEASE: ICD-10-CM

## 2020-08-18 DIAGNOSIS — I73.9 PAD (PERIPHERAL ARTERY DISEASE): ICD-10-CM

## 2020-08-18 DIAGNOSIS — I50.42 CHRONIC COMBINED SYSTOLIC AND DIASTOLIC HEART FAILURE: ICD-10-CM

## 2020-08-18 DIAGNOSIS — E78.2 MIXED HYPERLIPIDEMIA: ICD-10-CM

## 2020-08-18 DIAGNOSIS — I25.10 CORONARY ARTERY DISEASE INVOLVING NATIVE CORONARY ARTERY OF NATIVE HEART WITHOUT ANGINA PECTORIS: ICD-10-CM

## 2020-08-18 DIAGNOSIS — E11.9 TYPE 2 DIABETES MELLITUS WITHOUT COMPLICATION, WITH LONG-TERM CURRENT USE OF INSULIN: ICD-10-CM

## 2020-08-18 DIAGNOSIS — I10 ESSENTIAL HYPERTENSION: ICD-10-CM

## 2020-08-18 DIAGNOSIS — I70.229 CRITICAL LOWER LIMB ISCHEMIA: Primary | ICD-10-CM

## 2020-08-18 DIAGNOSIS — M86.172 ACUTE OSTEOMYELITIS OF LEFT CALCANEUS: ICD-10-CM

## 2020-08-18 DIAGNOSIS — Z01.818 PREOP TESTING: ICD-10-CM

## 2020-08-18 DIAGNOSIS — I42.0 DILATED CARDIOMYOPATHY: ICD-10-CM

## 2020-08-18 LAB
POC ACTIVATED CLOTTING TIME K: 136 SEC (ref 74–137)
POC ACTIVATED CLOTTING TIME K: 142 SEC (ref 74–137)
POC ACTIVATED CLOTTING TIME K: 219 SEC (ref 74–137)
POC ACTIVATED CLOTTING TIME K: 241 SEC (ref 74–137)
POC ACTIVATED CLOTTING TIME K: 257 SEC (ref 74–137)
POC ACTIVATED CLOTTING TIME K: 257 SEC (ref 74–137)
POCT GLUCOSE: 238 MG/DL (ref 70–110)
POCT GLUCOSE: 240 MG/DL (ref 70–110)
POCT GLUCOSE: 260 MG/DL (ref 70–110)
POCT GLUCOSE: 298 MG/DL (ref 70–110)
SAMPLE: ABNORMAL
SAMPLE: NORMAL

## 2020-08-18 PROCEDURE — 63600175 PHARM REV CODE 636 W HCPCS: Performed by: REGISTERED NURSE

## 2020-08-18 PROCEDURE — 63600175 PHARM REV CODE 636 W HCPCS: Performed by: ANESTHESIOLOGY

## 2020-08-18 PROCEDURE — D9220A PRA ANESTHESIA: ICD-10-PCS | Mod: CRNA,,, | Performed by: REGISTERED NURSE

## 2020-08-18 PROCEDURE — 25000003 PHARM REV CODE 250: Performed by: SURGERY

## 2020-08-18 PROCEDURE — 82962 GLUCOSE BLOOD TEST: CPT | Performed by: SURGERY

## 2020-08-18 PROCEDURE — 20000000 HC ICU ROOM

## 2020-08-18 PROCEDURE — 35656 PR BYPASS GRAFT OTHR,FEM-POP: ICD-10-PCS | Mod: 80,,, | Performed by: SURGERY

## 2020-08-18 PROCEDURE — 37000009 HC ANESTHESIA EA ADD 15 MINS: Performed by: SURGERY

## 2020-08-18 PROCEDURE — 27201423 OPTIME MED/SURG SUP & DEVICES STERILE SUPPLY: Performed by: SURGERY

## 2020-08-18 PROCEDURE — 25000003 PHARM REV CODE 250: Performed by: REGISTERED NURSE

## 2020-08-18 PROCEDURE — 36000708 HC OR TIME LEV III 1ST 15 MIN: Performed by: SURGERY

## 2020-08-18 PROCEDURE — 63600175 PHARM REV CODE 636 W HCPCS: Performed by: SURGERY

## 2020-08-18 PROCEDURE — 35656 BPG FEMORAL-POPLITEAL: CPT | Mod: LT,,, | Performed by: SURGERY

## 2020-08-18 PROCEDURE — C1768 GRAFT, VASCULAR: HCPCS | Performed by: SURGERY

## 2020-08-18 PROCEDURE — 71000039 HC RECOVERY, EACH ADD'L HOUR: Performed by: SURGERY

## 2020-08-18 PROCEDURE — D9220A PRA ANESTHESIA: Mod: ANES,,, | Performed by: ANESTHESIOLOGY

## 2020-08-18 PROCEDURE — 36000709 HC OR TIME LEV III EA ADD 15 MIN: Performed by: SURGERY

## 2020-08-18 PROCEDURE — 35656 BPG FEMORAL-POPLITEAL: CPT | Mod: 80,,, | Performed by: SURGERY

## 2020-08-18 PROCEDURE — 35656 PR BYPASS GRAFT OTHR,FEM-POP: ICD-10-PCS | Mod: LT,,, | Performed by: SURGERY

## 2020-08-18 PROCEDURE — D9220A PRA ANESTHESIA: Mod: CRNA,,, | Performed by: REGISTERED NURSE

## 2020-08-18 PROCEDURE — 71000033 HC RECOVERY, INTIAL HOUR: Performed by: SURGERY

## 2020-08-18 PROCEDURE — 27100025 HC TUBING, SET FLUID WARMER: Performed by: ANESTHESIOLOGY

## 2020-08-18 PROCEDURE — 37000008 HC ANESTHESIA 1ST 15 MINUTES: Performed by: SURGERY

## 2020-08-18 PROCEDURE — D9220A PRA ANESTHESIA: ICD-10-PCS | Mod: ANES,,, | Performed by: ANESTHESIOLOGY

## 2020-08-18 DEVICE — IMPLANTABLE DEVICE: Type: IMPLANTABLE DEVICE | Site: GROIN | Status: FUNCTIONAL

## 2020-08-18 RX ORDER — MUPIROCIN 20 MG/G
OINTMENT TOPICAL 2 TIMES DAILY
Status: COMPLETED | OUTPATIENT
Start: 2020-08-18 | End: 2020-08-23

## 2020-08-18 RX ORDER — GLUCAGON 1 MG
1 KIT INJECTION
Status: DISCONTINUED | OUTPATIENT
Start: 2020-08-18 | End: 2020-08-24 | Stop reason: HOSPADM

## 2020-08-18 RX ORDER — CEFAZOLIN SODIUM 2 G/50ML
2 SOLUTION INTRAVENOUS
Status: DISCONTINUED | OUTPATIENT
Start: 2020-08-18 | End: 2020-08-19

## 2020-08-18 RX ORDER — SODIUM CHLORIDE 9 MG/ML
INJECTION, SOLUTION INTRAVENOUS CONTINUOUS
Status: DISCONTINUED | OUTPATIENT
Start: 2020-08-18 | End: 2020-08-20

## 2020-08-18 RX ORDER — PHENYLEPHRINE HYDROCHLORIDE 10 MG/ML
INJECTION INTRAVENOUS
Status: DISCONTINUED | OUTPATIENT
Start: 2020-08-18 | End: 2020-08-18

## 2020-08-18 RX ORDER — CEFAZOLIN SODIUM 2 G/50ML
2 SOLUTION INTRAVENOUS ONCE
Status: DISCONTINUED | OUTPATIENT
Start: 2020-08-18 | End: 2020-08-18 | Stop reason: SDUPTHER

## 2020-08-18 RX ORDER — HYDROMORPHONE HYDROCHLORIDE 2 MG/ML
0.2 INJECTION, SOLUTION INTRAMUSCULAR; INTRAVENOUS; SUBCUTANEOUS EVERY 5 MIN PRN
Status: DISCONTINUED | OUTPATIENT
Start: 2020-08-18 | End: 2020-08-18 | Stop reason: HOSPADM

## 2020-08-18 RX ORDER — FENTANYL CITRATE 50 UG/ML
25 INJECTION, SOLUTION INTRAMUSCULAR; INTRAVENOUS EVERY 5 MIN PRN
Status: DISCONTINUED | OUTPATIENT
Start: 2020-08-18 | End: 2020-08-18 | Stop reason: HOSPADM

## 2020-08-18 RX ORDER — CEFAZOLIN SODIUM 2 G/50ML
2 SOLUTION INTRAVENOUS ONCE
Status: COMPLETED | OUTPATIENT
Start: 2020-08-18 | End: 2020-08-18

## 2020-08-18 RX ORDER — ONDANSETRON 4 MG/1
4 TABLET, ORALLY DISINTEGRATING ORAL EVERY 12 HOURS PRN
Status: DISCONTINUED | OUTPATIENT
Start: 2020-08-18 | End: 2020-08-24 | Stop reason: HOSPADM

## 2020-08-18 RX ORDER — HEPARIN SODIUM 1000 [USP'U]/ML
INJECTION, SOLUTION INTRAVENOUS; SUBCUTANEOUS
Status: DISCONTINUED | OUTPATIENT
Start: 2020-08-18 | End: 2020-08-18 | Stop reason: HOSPADM

## 2020-08-18 RX ORDER — HYDROCODONE BITARTRATE AND ACETAMINOPHEN 5; 325 MG/1; MG/1
1 TABLET ORAL EVERY 4 HOURS PRN
Status: DISCONTINUED | OUTPATIENT
Start: 2020-08-18 | End: 2020-08-24 | Stop reason: HOSPADM

## 2020-08-18 RX ORDER — IBUPROFEN 200 MG
16 TABLET ORAL
Status: DISCONTINUED | OUTPATIENT
Start: 2020-08-18 | End: 2020-08-24 | Stop reason: HOSPADM

## 2020-08-18 RX ORDER — ONDANSETRON 2 MG/ML
INJECTION INTRAMUSCULAR; INTRAVENOUS
Status: DISCONTINUED | OUTPATIENT
Start: 2020-08-18 | End: 2020-08-18

## 2020-08-18 RX ORDER — SODIUM CHLORIDE 0.9 % (FLUSH) 0.9 %
3 SYRINGE (ML) INJECTION
Status: DISCONTINUED | OUTPATIENT
Start: 2020-08-18 | End: 2020-08-18 | Stop reason: HOSPADM

## 2020-08-18 RX ORDER — OXYCODONE AND ACETAMINOPHEN 5; 325 MG/1; MG/1
1 TABLET ORAL EVERY 6 HOURS PRN
Status: DISCONTINUED | OUTPATIENT
Start: 2020-08-18 | End: 2020-08-24 | Stop reason: HOSPADM

## 2020-08-18 RX ORDER — HYDROMORPHONE HYDROCHLORIDE 2 MG/ML
INJECTION, SOLUTION INTRAMUSCULAR; INTRAVENOUS; SUBCUTANEOUS
Status: DISCONTINUED | OUTPATIENT
Start: 2020-08-18 | End: 2020-08-18

## 2020-08-18 RX ORDER — ROCURONIUM BROMIDE 10 MG/ML
INJECTION, SOLUTION INTRAVENOUS
Status: DISCONTINUED | OUTPATIENT
Start: 2020-08-18 | End: 2020-08-18

## 2020-08-18 RX ORDER — INSULIN ASPART 100 [IU]/ML
0-5 INJECTION, SOLUTION INTRAVENOUS; SUBCUTANEOUS
Status: DISCONTINUED | OUTPATIENT
Start: 2020-08-18 | End: 2020-08-24 | Stop reason: HOSPADM

## 2020-08-18 RX ORDER — LIDOCAINE HYDROCHLORIDE 20 MG/ML
INJECTION INTRAVENOUS
Status: DISCONTINUED | OUTPATIENT
Start: 2020-08-18 | End: 2020-08-18

## 2020-08-18 RX ORDER — IBUPROFEN 200 MG
24 TABLET ORAL
Status: DISCONTINUED | OUTPATIENT
Start: 2020-08-18 | End: 2020-08-24 | Stop reason: HOSPADM

## 2020-08-18 RX ORDER — HYDRALAZINE HYDROCHLORIDE 25 MG/1
25 TABLET, FILM COATED ORAL EVERY 8 HOURS
Status: DISCONTINUED | OUTPATIENT
Start: 2020-08-18 | End: 2020-08-24 | Stop reason: HOSPADM

## 2020-08-18 RX ORDER — HYDRALAZINE HYDROCHLORIDE 20 MG/ML
10 INJECTION INTRAMUSCULAR; INTRAVENOUS EVERY 4 HOURS PRN
Status: DISCONTINUED | OUTPATIENT
Start: 2020-08-18 | End: 2020-08-24 | Stop reason: HOSPADM

## 2020-08-18 RX ORDER — MIDAZOLAM HYDROCHLORIDE 1 MG/ML
INJECTION, SOLUTION INTRAMUSCULAR; INTRAVENOUS
Status: DISCONTINUED | OUTPATIENT
Start: 2020-08-18 | End: 2020-08-18

## 2020-08-18 RX ORDER — SUCCINYLCHOLINE CHLORIDE 20 MG/ML
INJECTION INTRAMUSCULAR; INTRAVENOUS
Status: DISCONTINUED | OUTPATIENT
Start: 2020-08-18 | End: 2020-08-18

## 2020-08-18 RX ORDER — LABETALOL HYDROCHLORIDE 5 MG/ML
10 INJECTION, SOLUTION INTRAVENOUS EVERY 4 HOURS PRN
Status: DISCONTINUED | OUTPATIENT
Start: 2020-08-18 | End: 2020-08-24 | Stop reason: HOSPADM

## 2020-08-18 RX ORDER — MORPHINE SULFATE 10 MG/ML
2 INJECTION INTRAMUSCULAR; INTRAVENOUS; SUBCUTANEOUS
Status: DISCONTINUED | OUTPATIENT
Start: 2020-08-18 | End: 2020-08-24 | Stop reason: HOSPADM

## 2020-08-18 RX ORDER — PROTAMINE SULFATE 10 MG/ML
INJECTION, SOLUTION INTRAVENOUS
Status: DISCONTINUED | OUTPATIENT
Start: 2020-08-18 | End: 2020-08-18

## 2020-08-18 RX ORDER — SODIUM CHLORIDE 0.9 G/100ML
IRRIGANT IRRIGATION
Status: DISCONTINUED | OUTPATIENT
Start: 2020-08-18 | End: 2020-08-18 | Stop reason: HOSPADM

## 2020-08-18 RX ORDER — ACETAMINOPHEN 10 MG/ML
1000 INJECTION, SOLUTION INTRAVENOUS ONCE
Status: COMPLETED | OUTPATIENT
Start: 2020-08-18 | End: 2020-08-18

## 2020-08-18 RX ORDER — NICARDIPINE HYDROCHLORIDE 0.2 MG/ML
INJECTION INTRAVENOUS CONTINUOUS PRN
Status: DISCONTINUED | OUTPATIENT
Start: 2020-08-18 | End: 2020-08-18

## 2020-08-18 RX ORDER — CARVEDILOL 6.25 MG/1
6.25 TABLET ORAL DAILY
Status: DISCONTINUED | OUTPATIENT
Start: 2020-08-19 | End: 2020-08-24 | Stop reason: HOSPADM

## 2020-08-18 RX ORDER — ONDANSETRON 2 MG/ML
4 INJECTION INTRAMUSCULAR; INTRAVENOUS EVERY 4 HOURS PRN
Status: DISCONTINUED | OUTPATIENT
Start: 2020-08-18 | End: 2020-08-24 | Stop reason: HOSPADM

## 2020-08-18 RX ORDER — ACETAMINOPHEN 500 MG
1000 TABLET ORAL EVERY 6 HOURS PRN
Status: DISCONTINUED | OUTPATIENT
Start: 2020-08-18 | End: 2020-08-24 | Stop reason: HOSPADM

## 2020-08-18 RX ORDER — HYDROMORPHONE HYDROCHLORIDE 2 MG/ML
0.2 INJECTION, SOLUTION INTRAMUSCULAR; INTRAVENOUS; SUBCUTANEOUS
Status: DISCONTINUED | OUTPATIENT
Start: 2020-08-18 | End: 2020-08-24 | Stop reason: HOSPADM

## 2020-08-18 RX ORDER — PROPOFOL 10 MG/ML
VIAL (ML) INTRAVENOUS
Status: DISCONTINUED | OUTPATIENT
Start: 2020-08-18 | End: 2020-08-18

## 2020-08-18 RX ORDER — FENTANYL CITRATE 50 UG/ML
INJECTION, SOLUTION INTRAMUSCULAR; INTRAVENOUS
Status: DISCONTINUED | OUTPATIENT
Start: 2020-08-18 | End: 2020-08-18

## 2020-08-18 RX ORDER — SODIUM CHLORIDE 9 MG/ML
INJECTION, SOLUTION INTRAVENOUS CONTINUOUS
Status: DISCONTINUED | OUTPATIENT
Start: 2020-08-18 | End: 2020-08-18

## 2020-08-18 RX ORDER — SODIUM CHLORIDE, SODIUM LACTATE, POTASSIUM CHLORIDE, CALCIUM CHLORIDE 600; 310; 30; 20 MG/100ML; MG/100ML; MG/100ML; MG/100ML
INJECTION, SOLUTION INTRAVENOUS CONTINUOUS
Status: DISCONTINUED | OUTPATIENT
Start: 2020-08-18 | End: 2020-08-18

## 2020-08-18 RX ORDER — ETOMIDATE 2 MG/ML
INJECTION INTRAVENOUS
Status: DISCONTINUED | OUTPATIENT
Start: 2020-08-18 | End: 2020-08-18

## 2020-08-18 RX ORDER — AMIODARONE HYDROCHLORIDE 200 MG/1
400 TABLET ORAL DAILY
Status: DISCONTINUED | OUTPATIENT
Start: 2020-08-19 | End: 2020-08-24 | Stop reason: HOSPADM

## 2020-08-18 RX ADMIN — PHENYLEPHRINE HYDROCHLORIDE 50 MCG: 10 INJECTION INTRAVENOUS at 11:08

## 2020-08-18 RX ADMIN — PROPOFOL 30 MG: 10 INJECTION, EMULSION INTRAVENOUS at 08:08

## 2020-08-18 RX ADMIN — PHENYLEPHRINE HYDROCHLORIDE 100 MCG: 10 INJECTION INTRAVENOUS at 10:08

## 2020-08-18 RX ADMIN — ROCURONIUM BROMIDE 20 MG: 10 INJECTION, SOLUTION INTRAVENOUS at 09:08

## 2020-08-18 RX ADMIN — HYDROMORPHONE HYDROCHLORIDE 0.2 MG: 2 INJECTION, SOLUTION INTRAMUSCULAR; INTRAVENOUS; SUBCUTANEOUS at 12:08

## 2020-08-18 RX ADMIN — HYDROMORPHONE HYDROCHLORIDE 0.4 MG: 2 INJECTION, SOLUTION INTRAMUSCULAR; INTRAVENOUS; SUBCUTANEOUS at 01:08

## 2020-08-18 RX ADMIN — HYDROMORPHONE HYDROCHLORIDE 0.6 MG: 2 INJECTION, SOLUTION INTRAMUSCULAR; INTRAVENOUS; SUBCUTANEOUS at 01:08

## 2020-08-18 RX ADMIN — PHENYLEPHRINE HYDROCHLORIDE 100 MCG: 10 INJECTION INTRAVENOUS at 08:08

## 2020-08-18 RX ADMIN — HYDROMORPHONE HYDROCHLORIDE 0.2 MG: 2 INJECTION, SOLUTION INTRAMUSCULAR; INTRAVENOUS; SUBCUTANEOUS at 04:08

## 2020-08-18 RX ADMIN — PROPOFOL 50 MG: 10 INJECTION, EMULSION INTRAVENOUS at 08:08

## 2020-08-18 RX ADMIN — MUPIROCIN: 20 OINTMENT TOPICAL at 09:08

## 2020-08-18 RX ADMIN — PHENYLEPHRINE HYDROCHLORIDE 100 MCG: 10 INJECTION INTRAVENOUS at 12:08

## 2020-08-18 RX ADMIN — PHENYLEPHRINE HYDROCHLORIDE 40 MCG/MIN: 10 INJECTION INTRAVENOUS at 11:08

## 2020-08-18 RX ADMIN — SUCCINYLCHOLINE CHLORIDE 140 MG: 20 INJECTION, SOLUTION INTRAMUSCULAR; INTRAVENOUS at 07:08

## 2020-08-18 RX ADMIN — SODIUM CHLORIDE, SODIUM LACTATE, POTASSIUM CHLORIDE, AND CALCIUM CHLORIDE: .6; .31; .03; .02 INJECTION, SOLUTION INTRAVENOUS at 07:08

## 2020-08-18 RX ADMIN — PROPOFOL 50 MG: 10 INJECTION, EMULSION INTRAVENOUS at 09:08

## 2020-08-18 RX ADMIN — Medication 100 MG: at 07:08

## 2020-08-18 RX ADMIN — HYDROMORPHONE HYDROCHLORIDE 0.2 MG: 2 INJECTION, SOLUTION INTRAMUSCULAR; INTRAVENOUS; SUBCUTANEOUS at 07:08

## 2020-08-18 RX ADMIN — PROPOFOL 50 MG: 10 INJECTION, EMULSION INTRAVENOUS at 07:08

## 2020-08-18 RX ADMIN — PROTAMINE SULFATE 5 MG: 10 INJECTION, SOLUTION INTRAVENOUS at 11:08

## 2020-08-18 RX ADMIN — ONDANSETRON 4 MG: 4 TABLET, ORALLY DISINTEGRATING ORAL at 05:08

## 2020-08-18 RX ADMIN — FENTANYL CITRATE 50 MCG: 50 INJECTION INTRAMUSCULAR; INTRAVENOUS at 07:08

## 2020-08-18 RX ADMIN — FENTANYL CITRATE 50 MCG: 50 INJECTION INTRAMUSCULAR; INTRAVENOUS at 08:08

## 2020-08-18 RX ADMIN — FENTANYL CITRATE 50 MCG: 50 INJECTION INTRAMUSCULAR; INTRAVENOUS at 09:08

## 2020-08-18 RX ADMIN — HYDRALAZINE HYDROCHLORIDE 10 MG: 20 INJECTION INTRAMUSCULAR; INTRAVENOUS at 09:08

## 2020-08-18 RX ADMIN — MIDAZOLAM HYDROCHLORIDE 2 MG: 1 INJECTION, SOLUTION INTRAMUSCULAR; INTRAVENOUS at 07:08

## 2020-08-18 RX ADMIN — SUGAMMADEX 200 MG: 100 INJECTION, SOLUTION INTRAVENOUS at 01:08

## 2020-08-18 RX ADMIN — HYDROMORPHONE HYDROCHLORIDE 1 MG: 2 INJECTION, SOLUTION INTRAMUSCULAR; INTRAVENOUS; SUBCUTANEOUS at 01:08

## 2020-08-18 RX ADMIN — HYDROMORPHONE HYDROCHLORIDE 0.4 MG: 2 INJECTION, SOLUTION INTRAMUSCULAR; INTRAVENOUS; SUBCUTANEOUS at 12:08

## 2020-08-18 RX ADMIN — INSULIN ASPART 1 UNITS: 100 INJECTION, SOLUTION INTRAVENOUS; SUBCUTANEOUS at 11:08

## 2020-08-18 RX ADMIN — ACETAMINOPHEN 1000 MG: 10 INJECTION, SOLUTION INTRAVENOUS at 02:08

## 2020-08-18 RX ADMIN — ONDANSETRON 4 MG: 2 INJECTION, SOLUTION INTRAMUSCULAR; INTRAVENOUS at 12:08

## 2020-08-18 RX ADMIN — PROTAMINE SULFATE 20 MG: 10 INJECTION, SOLUTION INTRAVENOUS at 12:08

## 2020-08-18 RX ADMIN — ETOMIDATE 10 MG: 2 INJECTION, SOLUTION INTRAVENOUS at 07:08

## 2020-08-18 RX ADMIN — PROTAMINE SULFATE 30 MG: 10 INJECTION, SOLUTION INTRAVENOUS at 11:08

## 2020-08-18 RX ADMIN — ROCURONIUM BROMIDE 5 MG: 10 INJECTION, SOLUTION INTRAVENOUS at 07:08

## 2020-08-18 RX ADMIN — SODIUM CHLORIDE: 0.9 INJECTION, SOLUTION INTRAVENOUS at 03:08

## 2020-08-18 RX ADMIN — HEPARIN SODIUM 2000 UNITS: 1000 INJECTION, SOLUTION INTRAVENOUS; SUBCUTANEOUS at 11:08

## 2020-08-18 RX ADMIN — HEPARIN SODIUM 1000 UNITS: 1000 INJECTION, SOLUTION INTRAVENOUS; SUBCUTANEOUS at 10:08

## 2020-08-18 RX ADMIN — CEFAZOLIN SODIUM 2 G: 2 SOLUTION INTRAVENOUS at 07:08

## 2020-08-18 RX ADMIN — CEFAZOLIN SODIUM 2 G: 2 SOLUTION INTRAVENOUS at 11:08

## 2020-08-18 RX ADMIN — ONDANSETRON 4 MG: 2 INJECTION INTRAMUSCULAR; INTRAVENOUS at 05:08

## 2020-08-18 RX ADMIN — SODIUM CHLORIDE: 0.9 INJECTION, SOLUTION INTRAVENOUS at 07:08

## 2020-08-18 RX ADMIN — INSULIN ASPART 3 UNITS: 100 INJECTION, SOLUTION INTRAVENOUS; SUBCUTANEOUS at 05:08

## 2020-08-18 RX ADMIN — PROMETHAZINE HYDROCHLORIDE 12.5 MG: 25 INJECTION INTRAMUSCULAR; INTRAVENOUS at 09:08

## 2020-08-18 RX ADMIN — HEPARIN SODIUM 2000 UNITS: 1000 INJECTION, SOLUTION INTRAVENOUS; SUBCUTANEOUS at 10:08

## 2020-08-18 RX ADMIN — NICARDIPINE HYDROCHLORIDE 5 MG/HR: 0.2 INJECTION, SOLUTION INTRAVENOUS at 09:08

## 2020-08-18 RX ADMIN — ROCURONIUM BROMIDE 20 MG: 10 INJECTION, SOLUTION INTRAVENOUS at 08:08

## 2020-08-18 RX ADMIN — SODIUM CHLORIDE 50 ML/HR: 0.9 INJECTION, SOLUTION INTRAVENOUS at 02:08

## 2020-08-18 RX ADMIN — HEPARIN SODIUM 9000 UNITS: 1000 INJECTION, SOLUTION INTRAVENOUS; SUBCUTANEOUS at 10:08

## 2020-08-18 RX ADMIN — PROPOFOL 50 MG: 10 INJECTION, EMULSION INTRAVENOUS at 11:08

## 2020-08-18 RX ADMIN — ROCURONIUM BROMIDE 20 MG: 10 INJECTION, SOLUTION INTRAVENOUS at 07:08

## 2020-08-18 RX ADMIN — SODIUM CHLORIDE: 0.9 INJECTION, SOLUTION INTRAVENOUS at 11:08

## 2020-08-18 RX ADMIN — HYDRALAZINE HYDROCHLORIDE 10 MG: 20 INJECTION INTRAMUSCULAR; INTRAVENOUS at 06:08

## 2020-08-18 NOTE — TELEPHONE ENCOUNTER
Call to the clinic asking about visiting in the hospital. Explained to the  that whatever the rules were in the hospital are standing for every patient across the board. Explained they hospital will not bend the rules for one patient. He asked if Dr. Huber would contact him after surgery and stated that yes Dr. Huber contacts family members after any surgery. He had other questions but explained that throes were questions he needed to ask the hospital.

## 2020-08-18 NOTE — TRANSFER OF CARE
Anesthesia Transfer of Care Note    Patient: Suyapa Connelly    Procedure(s) Performed: Procedure(s) (LRB):  CREATION, BYPASS, ARTERIAL, FEMORAL TO POPLITEAL, USING GRAFT, LEFT LOWER EXTREMITY (Left)    Patient location: PACU    Anesthesia Type: general    Transport from OR: Transported from OR on room air with adequate spontaneous ventilation    Post pain: adequate analgesia    Post assessment: tolerated procedure well and no apparent anesthetic complications    Post vital signs: stable    Level of consciousness: sedated    Nausea/Vomiting: no nausea/vomiting    Complications: none    Transfer of care protocol was followed      Last vitals:   Visit Vitals  BP (!) 158/65 (BP Location: Right arm, Patient Position: Lying)   Pulse 76   Temp 37 °C (98.6 °F) (Oral)   Resp 16   Wt 89.5 kg (197 lb 5 oz)   LMP 09/30/2015 (Exact Date)   SpO2 100%   Breastfeeding No   BMI 32.83 kg/m²

## 2020-08-18 NOTE — ANESTHESIA PROCEDURE NOTES
Intubation  Performed by: Emmett Mcfarland MD  Authorized by: Emmtet Mcfarland MD     Intubation:     Induction:  Intravenous    Intubated:  Postinduction    Mask Ventilation:  Easy mask    Attempts:  1    Attempted By:  Other (see comments) (Dr. Conway, Resident Emergency Dept physician)    Method of Intubation:  Direct    Blade:  Kennedy 3    Laryngeal View Grade: Grade I - full view of chords      Difficult Airway Encountered?: No      Complications:  None    Airway Device:  Oral endotracheal tube    Airway Device Size:  7.0    Style/Cuff Inflation:  Cuffed (inflated to minimal occlusive pressure)    Inflation Amount (mL):  3    Tube secured:  21    Secured at:  The teeth    Placement Verified By:  Capnometry    Complicating Factors:  None    Findings Post-Intubation:  BS equal bilateral and atraumatic/condition of teeth unchanged

## 2020-08-18 NOTE — BRIEF OP NOTE
[facility]  Brief Operative Note    SUMMARY     Surgery Date: 8/18/2020     Surgeon(s) and Role:     * Teddy Huber MD - Primary         Assistant:    1. Clem Inman MD - Assisting  2. Lulu Goff MS3    Pre-op Diagnosis:  Critical lower limb ischemia [I99.8]    Post-op Diagnosis:  Post-Op Diagnosis Codes:     * Critical lower limb ischemia [I99.8]    Procedure(s) (LRB):  CREATION, BYPASS, ARTERIAL, FEMORAL TO POPLITEAL, USING GRAFT, LEFT LOWER EXTREMITY (Left)    Anesthesia: General    Description of Procedure: adequate artery for bypass    Description of the findings of the procedure: 2+ L DP at conclusion of case    Estimated Blood Loss: 75 cc         Specimens:   Specimen (12h ago, onward)    None

## 2020-08-18 NOTE — PLAN OF CARE
PT admitted to ICU from PACU. Left leg dressing CDI. Dorsal Pedis pulses monitored Q 1 hour with Doppler. PT on room air. Sats %. Right Radial A-Line in place. PT vomitted Xs 2. PRN Zofran given. PRN hydralazine given. PT NS on monitor. Pt remains NPO. Pt still drowsy.  at bedside updated on plan of care. Kerns in place with good urine output. No falls, injuries, or skin breakdown.

## 2020-08-18 NOTE — ANESTHESIA PREPROCEDURE EVALUATION
08/18/2020  Suyapa Connelly is a 54 y.o., female.    Anesthesia Evaluation     I have reviewed the Nursing Notes.       Review of Systems  Anesthesia Hx:  No problems with previous Anesthesia   Social:  Non-Smoker    Cardiovascular:   Exercise tolerance: poor Denies Pacemaker. Hypertension Valvular problems/Murmurs, AI, MR Past MI CAD   CABG/stent  CHF HARTMAN ECG has been reviewed. Echo 2020:  · Low normal left ventricular systolic function. The estimated ejection fraction is 50%.  · Concentric left ventricular hypertrophy.  · Indeterminate left ventricular diastolic function.  · Normal right ventricular systolic function.  · Mild left atrial enlargement.  · Mild aortic regurgitation.  · Mild mitral regurgitation.  · Mild tricuspid regurgitation.  · Normal central venous pressure (3 mmHg).  · The estimated PA systolic pressure is 37 mmHg.   Pulmonary:   Denies Pneumonia Denies COPD.  Denies Asthma.  Denies Shortness of breath.  Denies Recent URI.  Denies Sleep Apnea.    Renal/:   Chronic Renal Disease, CRI    Hepatic/GI:   No Bowel Prep. Denies PUD. GERD Denies Liver Disease. Denies Hepatitis.    Neurological:  Neurology Normal    Endocrine:   Diabetes, type 2, using insulin Denies Hypothyroidism. Denies Hyperthyroidism.    Psych:   Psychiatric History          Physical Exam  General:  Obesity    Airway/Jaw/Neck:  AIRWAY FINDINGS: Normal      Chest/Lungs:  Chest/Lungs Clear    Heart/Vascular:  Heart Findings: Normal       Mental Status:  Mental Status Findings: Normal        Anesthesia Plan  Type of Anesthesia, risks & benefits discussed:  Anesthesia Type:  general  Patient's Preference:   Intra-op Monitoring Plan: standard ASA monitors and arterial line  Intra-op Monitoring Plan Comments:   Post Op Pain Control Plan:   Post Op Pain Control Plan Comments:   Induction:   IV  Beta Blocker:  Patient is not  currently on a Beta-Blocker (No further documentation required).       Informed Consent: Patient understands risks and agrees with Anesthesia plan.  Questions answered. Anesthesia consent signed with patient.  ASA Score: 3     Day of Surgery Review of History & Physical:  There are no significant changes.  H&P update referred to the provider.         Ready For Surgery From Anesthesia Perspective.

## 2020-08-18 NOTE — H&P
Teddy Huber MD [facility]                         08/18/2020    HPI:  Suypaa Connelly is a 54 y.o. female with   Patient Active Problem List   Diagnosis    Gangrene of toe    ROSLYN (acute kidney injury)    Peripheral artery disease    Right foot pain    Peripheral vascular disease    PAD (peripheral artery disease)    Hypokalemia, gastrointestinal losses    Acute encephalopathy    Type 2 diabetes mellitus without complication, with long-term current use of insulin    Panic attack as reaction to stress    Malnutrition of moderate degree    Essential hypertension    Vitamin D deficiency    CAROLIN (generalized anxiety disorder)    MDD (major depressive disorder), recurrent episode, moderate    Bereavement    Injury to kidney    Dehydration    Acute cystitis with hematuria    Syncope    Non-intractable vomiting with nausea    Hypertensive urgency    Shortness of breath    Chronic combined systolic and diastolic heart failure    Mixed hyperlipidemia    Dilated cardiomyopathy    Acute coronary syndrome    Acute on chronic combined systolic (congestive) and diastolic (congestive) heart failure    Type 2 diabetes mellitus with hyperglycemia, with long-term current use of insulin    Coronary artery disease involving native coronary artery of native heart without angina pectoris    Contrast dye induced nephropathy    Weakness    Fluid overload    S/P CABG x 1    Acute blood loss anemia    Paroxysmal atrial fibrillation    Alteration in skin integrity in adult    Preventive measure    Ischemic cardiomyopathy    Non healing left heel wound    Acute osteomyelitis of left calcaneus    Stage 3 chronic kidney disease    Critical lower limb ischemia    being managed by PCP and specialists who is here today for evaluation of left heel wound.  Patient states location is left heel occurring for several months.  She had a coronary bypass in  January 2020 and was discharged with a small wound on her heel.  She states that progressed in the interim.  Associated signs and symptoms include discoloration and skin breakdown with minimal pain.  Quality is dull and severity is 3/10.  Symptoms began few months ago.  Alleviating factors include wound care and offloading.  Worsening factors include pressure.     2015 s/p R pop angioplasty, L SFA PTA, L pop stent 5x100 BMS, R pop angioplasty, R pop stent 5x100     yes MI  no Stroke  Tobacco use: no    8/2020:  No new issues.  Receiving local wound care and PO Abx.    Past Medical History:   Diagnosis Date    Anxiety     Depression     Diabetes mellitus     type 2    Diabetes mellitus, type 2     GERD (gastroesophageal reflux disease)     Hyperlipemia     Hypertension     Myocardial infarction 2010    minor-caused by stress per pt.     Past Surgical History:   Procedure Laterality Date    Angiogram - Right Extremity Right 7/9/15    angiogram-left leg  10/6/15    CATHETERIZATION OF BOTH LEFT AND RIGHT HEART N/A 12/18/2019    Procedure: CATHETERIZATION, HEART, BOTH LEFT AND RIGHT;  Surgeon: Que Fernando III, MD;  Location: [facility] CATH LAB;  Service: Cardiology;  Laterality: N/A;    CORONARY ANGIOGRAPHY N/A 12/18/2019    Procedure: ANGIOGRAM, CORONARY ARTERY;  Surgeon: Que Fernando III, MD;  Location: [facility] CATH LAB;  Service: Cardiology;  Laterality: N/A;    CORONARY ANGIOGRAPHY INCLUDING BYPASS GRAFTS WITH CATHETERIZATION OF LEFT HEART N/A 7/28/2020    Procedure: ANGIOGRAM, CORONARY, INCLUDING BYPASS GRAFT, WITH LEFT HEART CATHETERIZATION, 9 am;  Surgeon: Rachel Easley MD;  Location: [facility] CATH LAB;  Service: Cardiology;  Laterality: N/A;    CORONARY ARTERY BYPASS GRAFT (CABG) N/A 1/14/2020    Procedure: CORONARY ARTERY BYPASS GRAFT (CABG) x 1     Off Pump;  Surgeon: Huang Altamirano MD;  Location: [facility] OR [address] [place] [address];  Service: Cardiovascular;  Laterality: N/A;    INSERTION OF TUNNELED  CENTRAL VENOUS HEMODIALYSIS CATHETER N/A 1/27/2020    Procedure: Insertion, Catheter, Central Venous, Hemodialysis;  Surgeon: ESTEBAN Gomez III, MD;  Location: [facility] CATH LAB;  Service: Peripheral Vascular;  Laterality: N/A;     Family History   Problem Relation Age of Onset    Anesthesia problems Neg Hx      Social History     Socioeconomic History    Marital status: Legally      Spouse name: Not on file    Number of children: Not on file    Years of education: Not on file    Highest education level: Not on file   Occupational History    Not on file   Social Needs    Financial resource strain: Not on file    Food insecurity     Worry: Not on file     Inability: Not on file    Transportation needs     Medical: Not on file     Non-medical: Not on file   Tobacco Use    Smoking status: Never Smoker    Smokeless tobacco: Never Used   Substance and Sexual Activity    Alcohol use: No    Drug use: Yes     Types: Marijuana     Comment: used yesterday    Sexual activity: Not on file   Lifestyle    Physical activity     Days per week: Not on file     Minutes per session: Not on file    Stress: Not on file   Relationships    Social connections     Talks on phone: Not on file     Gets together: Not on file     Attends Anabaptist service: Not on file     Active member of club or organization: Not on file     Attends meetings of clubs or organizations: Not on file     Relationship status: Not on file   Other Topics Concern    Not on file   Social History Narrative    Not on file       Current Facility-Administered Medications:     0.9%  NaCl infusion, , Intravenous, Continuous, Teddy Huber MD    cefazolin (ANCEF) 2 gram in dextrose 5% 50 mL IVPB (premix), 2 g, Intravenous, Once, Teddy Huber MD    lactated ringers infusion, , Intravenous, Continuous, Grant Wolfe MD    REVIEW OF SYSTEMS:  General: No fevers or chills; ENT: No sore throat; Allergy and Immunology: no persistent  infections; Hematological and Lymphatic: No history of bleeding or easy bruising; Endocrine: negative; Respiratory: no cough, shortness of breath, or wheezing; Cardiovascular: no chest pain or dyspnea on exertion; Gastrointestinal: no abdominal pain/back, change in bowel habits, or bloody stools; Genito-Urinary: no dysuria, trouble voiding, or hematuria; Musculoskeletal: negative; Neurological: no TIA or stroke symptoms; Psychiatric: no nervousness, anxiety or depression.    PHYSICAL EXAM:      Pulse: 83  Temp: 98.1 °F (36.7 °C)      Physical Exam  Vitals signs reviewed.   Constitutional:       General: She is not in acute distress.     Appearance: She is well-developed. She is not diaphoretic.   HENT:      Head: Normocephalic and atraumatic.   Eyes:      Conjunctiva/sclera: Conjunctivae normal.   Neck:      Musculoskeletal: Neck supple.   Cardiovascular:      Rate and Rhythm: Normal rate.      Pulses:           Femoral pulses are 2+ on the right side and 2+ on the left side.       Popliteal pulses are 2+ on the right side and 2+ on the left side.        Dorsalis pedis pulses are detected w/ Doppler on the right side and detected w/ Doppler on the left side.        Posterior tibial pulses are detected w/ Doppler on the right side and detected w/ Doppler on the left side.   Pulmonary:      Effort: Pulmonary effort is normal.   Abdominal:      General: There is no distension.      Palpations: Abdomen is soft. There is no mass.      Tenderness: There is no abdominal tenderness. There is no guarding or rebound.      Hernia: No hernia is present.   Musculoskeletal: Normal range of motion.         General: No deformity.   Feet:      Left foot:      Skin integrity: Ulcer and skin breakdown present.   Skin:     General: Skin is warm and dry.      Findings: No rash.   Neurological:      General: No focal deficit present.      Mental Status: She is alert and oriented to person, place, and time.   Psychiatric:         Mood and  Affect: Mood normal.        LAB RESULTS:  No results found for: CBC  Lab Results   Component Value Date    LABPROT 12.3 02/04/2020    INR 1.2 02/04/2020     Lab Results   Component Value Date     08/14/2020    K 3.1 (L) 08/14/2020     08/14/2020    CO2 24 08/14/2020     (H) 08/14/2020    BUN 25 (H) 08/14/2020    CREATININE 2.3 (H) 08/14/2020    CALCIUM 8.1 (L) 08/14/2020    ANIONGAP 12 08/14/2020    EGFRNONAA 23.6 (A) 08/14/2020     Lab Results   Component Value Date    WBC 8.04 08/12/2020    RBC 3.02 (L) 08/12/2020    HGB 9.4 (L) 08/12/2020    HCT 30.1 (L) 08/12/2020     (H) 08/12/2020    MCH 31.1 (H) 08/12/2020    MCHC 31.2 (L) 08/12/2020    RDW 14.7 (H) 08/12/2020     (H) 08/12/2020    MPV 10.7 08/12/2020    GRAN 5.3 08/12/2020    GRAN 66.1 08/12/2020    LYMPH 1.4 08/12/2020    LYMPH 17.5 (L) 08/12/2020    MONO 0.7 08/12/2020    MONO 8.2 08/12/2020    EOS 0.6 (H) 08/12/2020    BASO 0.04 08/12/2020    EOSINOPHIL 7.3 08/12/2020    BASOPHIL 0.5 08/12/2020    DIFFMETHOD Automated 08/12/2020     .  Lab Results   Component Value Date    HGBA1C >14.0 (H) 07/17/2020       IMAGING:  All pertinent imaging has been reviewed and interpreted independently.    IMP/PLAN:  54 y.o. female with   Patient Active Problem List   Diagnosis    Gangrene of toe    ROSLYN (acute kidney injury)    Peripheral artery disease    Right foot pain    Peripheral vascular disease    PAD (peripheral artery disease)    Hypokalemia, gastrointestinal losses    Acute encephalopathy    Type 2 diabetes mellitus without complication, with long-term current use of insulin    Panic attack as reaction to stress    Malnutrition of moderate degree    Essential hypertension    Vitamin D deficiency    CAROLIN (generalized anxiety disorder)    MDD (major depressive disorder), recurrent episode, moderate    Bereavement    Injury to kidney    Dehydration    Acute cystitis with hematuria    Syncope    Non-intractable  vomiting with nausea    Hypertensive urgency    Shortness of breath    Chronic combined systolic and diastolic heart failure    Mixed hyperlipidemia    Dilated cardiomyopathy    Acute coronary syndrome    Acute on chronic combined systolic (congestive) and diastolic (congestive) heart failure    Type 2 diabetes mellitus with hyperglycemia, with long-term current use of insulin    Coronary artery disease involving native coronary artery of native heart without angina pectoris    Contrast dye induced nephropathy    Weakness    Fluid overload    S/P CABG x 1    Acute blood loss anemia    Paroxysmal atrial fibrillation    Alteration in skin integrity in adult    Preventive measure    Ischemic cardiomyopathy    Non healing left heel wound    Acute osteomyelitis of left calcaneus    Stage 3 chronic kidney disease    Critical lower limb ischemia    being managed by PCP and specialists who is here today LLE wound.    -Plan for L femoral - below knee popliteal bypass  -NPO

## 2020-08-19 LAB
ANION GAP SERPL CALC-SCNC: 12 MMOL/L (ref 8–16)
APTT BLDCRRT: 27 SEC (ref 21–32)
BASOPHILS # BLD AUTO: 0 K/UL (ref 0–0.2)
BASOPHILS NFR BLD: 0 % (ref 0–1.9)
BUN SERPL-MCNC: 24 MG/DL (ref 6–20)
CALCIUM SERPL-MCNC: 7.9 MG/DL (ref 8.7–10.5)
CHLORIDE SERPL-SCNC: 109 MMOL/L (ref 95–110)
CO2 SERPL-SCNC: 21 MMOL/L (ref 23–29)
CREAT SERPL-MCNC: 1.6 MG/DL (ref 0.5–1.4)
DIFFERENTIAL METHOD: ABNORMAL
EOSINOPHIL # BLD AUTO: 0.2 K/UL (ref 0–0.5)
EOSINOPHIL NFR BLD: 2.3 % (ref 0–8)
ERYTHROCYTE [DISTWIDTH] IN BLOOD BY AUTOMATED COUNT: 14.6 % (ref 11.5–14.5)
EST. GFR  (AFRICAN AMERICAN): 42 ML/MIN/1.73 M^2
EST. GFR  (NON AFRICAN AMERICAN): 37 ML/MIN/1.73 M^2
GLUCOSE SERPL-MCNC: 257 MG/DL (ref 70–110)
HCT VFR BLD AUTO: 25 % (ref 37–48.5)
HGB BLD-MCNC: 7.9 G/DL (ref 12–16)
IMM GRANULOCYTES # BLD AUTO: 0.04 K/UL (ref 0–0.04)
IMM GRANULOCYTES NFR BLD AUTO: 0.5 % (ref 0–0.5)
INR PPP: 1 (ref 0.8–1.2)
LYMPHOCYTES # BLD AUTO: 0.4 K/UL (ref 1–4.8)
LYMPHOCYTES NFR BLD: 4.9 % (ref 18–48)
MAGNESIUM SERPL-MCNC: 1.3 MG/DL (ref 1.6–2.6)
MCH RBC QN AUTO: 31.3 PG (ref 27–31)
MCHC RBC AUTO-ENTMCNC: 31.6 G/DL (ref 32–36)
MCV RBC AUTO: 99 FL (ref 82–98)
MONOCYTES # BLD AUTO: 0.8 K/UL (ref 0.3–1)
MONOCYTES NFR BLD: 9.6 % (ref 4–15)
NEUTROPHILS # BLD AUTO: 6.6 K/UL (ref 1.8–7.7)
NEUTROPHILS NFR BLD: 82.7 % (ref 38–73)
NRBC BLD-RTO: 0 /100 WBC
PHOSPHATE SERPL-MCNC: 2.9 MG/DL (ref 2.7–4.5)
PLATELET # BLD AUTO: 333 K/UL (ref 150–350)
PMV BLD AUTO: 10.8 FL (ref 9.2–12.9)
POCT GLUCOSE: 222 MG/DL (ref 70–110)
POCT GLUCOSE: 278 MG/DL (ref 70–110)
POCT GLUCOSE: 290 MG/DL (ref 70–110)
POTASSIUM SERPL-SCNC: 3.4 MMOL/L (ref 3.5–5.1)
PROTHROMBIN TIME: 11.1 SEC (ref 9–12.5)
RBC # BLD AUTO: 2.52 M/UL (ref 4–5.4)
SODIUM SERPL-SCNC: 142 MMOL/L (ref 136–145)
WBC # BLD AUTO: 7.94 K/UL (ref 3.9–12.7)

## 2020-08-19 PROCEDURE — 94761 N-INVAS EAR/PLS OXIMETRY MLT: CPT

## 2020-08-19 PROCEDURE — 99233 PR SUBSEQUENT HOSPITAL CARE,LEVL III: ICD-10-PCS | Mod: ,,, | Performed by: INTERNAL MEDICINE

## 2020-08-19 PROCEDURE — 63600175 PHARM REV CODE 636 W HCPCS: Performed by: INTERNAL MEDICINE

## 2020-08-19 PROCEDURE — 80048 BASIC METABOLIC PNL TOTAL CA: CPT

## 2020-08-19 PROCEDURE — 63600175 PHARM REV CODE 636 W HCPCS: Performed by: SURGERY

## 2020-08-19 PROCEDURE — 21400001 HC TELEMETRY ROOM

## 2020-08-19 PROCEDURE — 99024 POSTOP FOLLOW-UP VISIT: CPT | Mod: ,,, | Performed by: SURGERY

## 2020-08-19 PROCEDURE — 85730 THROMBOPLASTIN TIME PARTIAL: CPT

## 2020-08-19 PROCEDURE — 25000003 PHARM REV CODE 250: Performed by: INTERNAL MEDICINE

## 2020-08-19 PROCEDURE — 83735 ASSAY OF MAGNESIUM: CPT

## 2020-08-19 PROCEDURE — 99024 PR POST-OP FOLLOW-UP VISIT: ICD-10-PCS | Mod: ,,, | Performed by: SURGERY

## 2020-08-19 PROCEDURE — 85025 COMPLETE CBC W/AUTO DIFF WBC: CPT

## 2020-08-19 PROCEDURE — 25000003 PHARM REV CODE 250: Performed by: SURGERY

## 2020-08-19 PROCEDURE — 85610 PROTHROMBIN TIME: CPT

## 2020-08-19 PROCEDURE — 84100 ASSAY OF PHOSPHORUS: CPT

## 2020-08-19 PROCEDURE — 99233 SBSQ HOSP IP/OBS HIGH 50: CPT | Mod: ,,, | Performed by: INTERNAL MEDICINE

## 2020-08-19 RX ORDER — CEFEPIME HYDROCHLORIDE 1 G/50ML
2 INJECTION, SOLUTION INTRAVENOUS
Status: DISCONTINUED | OUTPATIENT
Start: 2020-08-19 | End: 2020-08-24 | Stop reason: HOSPADM

## 2020-08-19 RX ORDER — CEFEPIME HYDROCHLORIDE 1 G/50ML
1 INJECTION, SOLUTION INTRAVENOUS
Status: DISCONTINUED | OUTPATIENT
Start: 2020-08-19 | End: 2020-08-19

## 2020-08-19 RX ADMIN — MUPIROCIN: 20 OINTMENT TOPICAL at 09:08

## 2020-08-19 RX ADMIN — HYDROCODONE BITARTRATE AND ACETAMINOPHEN 1 TABLET: 5; 325 TABLET ORAL at 07:08

## 2020-08-19 RX ADMIN — SODIUM CHLORIDE: 0.9 INJECTION, SOLUTION INTRAVENOUS at 09:08

## 2020-08-19 RX ADMIN — CARVEDILOL 6.25 MG: 6.25 TABLET, FILM COATED ORAL at 09:08

## 2020-08-19 RX ADMIN — CEFAZOLIN SODIUM 2 G: 2 SOLUTION INTRAVENOUS at 03:08

## 2020-08-19 RX ADMIN — AMIODARONE HYDROCHLORIDE 400 MG: 200 TABLET ORAL at 09:08

## 2020-08-19 RX ADMIN — HYDRALAZINE HYDROCHLORIDE 25 MG: 25 TABLET, FILM COATED ORAL at 02:08

## 2020-08-19 RX ADMIN — MORPHINE SULFATE 2 MG: 10 INJECTION INTRAVENOUS at 10:08

## 2020-08-19 RX ADMIN — VANCOMYCIN HYDROCHLORIDE 1750 MG: 100 INJECTION, POWDER, LYOPHILIZED, FOR SOLUTION INTRAVENOUS at 12:08

## 2020-08-19 RX ADMIN — HYDROCODONE BITARTRATE AND ACETAMINOPHEN 1 TABLET: 5; 325 TABLET ORAL at 02:08

## 2020-08-19 RX ADMIN — HYDRALAZINE HYDROCHLORIDE 25 MG: 25 TABLET, FILM COATED ORAL at 10:08

## 2020-08-19 RX ADMIN — CEFEPIME HYDROCHLORIDE 2 G: 2 INJECTION, SOLUTION INTRAVENOUS at 12:08

## 2020-08-19 RX ADMIN — INSULIN ASPART 3 UNITS: 100 INJECTION, SOLUTION INTRAVENOUS; SUBCUTANEOUS at 12:08

## 2020-08-19 RX ADMIN — SODIUM CHLORIDE: 0.9 INJECTION, SOLUTION INTRAVENOUS at 05:08

## 2020-08-19 RX ADMIN — HYDROCODONE BITARTRATE AND ACETAMINOPHEN 1 TABLET: 5; 325 TABLET ORAL at 12:08

## 2020-08-19 RX ADMIN — ONDANSETRON 4 MG: 4 TABLET, ORALLY DISINTEGRATING ORAL at 05:08

## 2020-08-19 RX ADMIN — HYDROCODONE BITARTRATE AND ACETAMINOPHEN 1 TABLET: 5; 325 TABLET ORAL at 04:08

## 2020-08-19 RX ADMIN — HYDRALAZINE HYDROCHLORIDE 25 MG: 25 TABLET, FILM COATED ORAL at 05:08

## 2020-08-19 RX ADMIN — CEFEPIME HYDROCHLORIDE 2 G: 2 INJECTION, SOLUTION INTRAVENOUS at 10:08

## 2020-08-19 RX ADMIN — INSULIN ASPART 3 UNITS: 100 INJECTION, SOLUTION INTRAVENOUS; SUBCUTANEOUS at 05:08

## 2020-08-19 NOTE — PROGRESS NOTES
Pharmacokinetic Initial Assessment: IV Vancomycin    Assessment/Plan:    Initiate intravenous vancomycin with loading dose of 1750 mg once followed by a maintenance dose of vancomycin 1250 mg IV every 24 hours  Desired empiric serum trough concentration is 10 to 20 mcg/mL  Draw vancomycin trough level 30 min prior to third dose on 8/21 at approximately 12noon  Pharmacy will continue to follow and monitor vancomycin.      Please contact pharmacy at extension [phone] with any questions regarding this assessment.     Thank you for the consult,   Dianne Shinejulia       Patient brief summary:  Suyapa Connelly is a 54 y.o. female initiated on antimicrobial therapy with IV Vancomycin for treatment of suspected bone/joint infection    Drug Allergies:   Review of patient's allergies indicates:   Allergen Reactions    Ciprofloxacin      Itchiness    Contrast media      Kidney injury       Actual Body Weight:   89.4 kg    Renal Function:   Estimated Creatinine Clearance: 44.9 mL/min (A) (based on SCr of 1.6 mg/dL (H)).,     Dialysis Method (if applicable):  N/A    CBC (last 72 hours):  Recent Labs   Lab Result Units 08/19/20  0330   WBC K/uL 7.94   Hemoglobin g/dL 7.9*   Hematocrit % 25.0*   Platelets K/uL 333   Gran% % 82.7*   Lymph% % 4.9*   Mono% % 9.6   Eosinophil% % 2.3   Basophil% % 0.0   Differential Method  Automated       Metabolic Panel (last 72 hours):  Recent Labs   Lab Result Units 08/19/20  0330   Sodium mmol/L 142   Potassium mmol/L 3.4*   Chloride mmol/L 109   CO2 mmol/L 21*   Glucose mg/dL 257*   BUN, Bld mg/dL 24*   Creatinine mg/dL 1.6*   Magnesium mg/dL 1.3*   Phosphorus mg/dL 2.9       Drug levels (last 3 results):  No results for input(s): VANCOMYCINRA, VANCOMYCINPE, VANCOMYCINTR in the last 72 hours.    Microbiologic Results:  Microbiology Results (last 7 days)       ** No results found for the last 168 hours. **

## 2020-08-19 NOTE — CARE UPDATE
[facility]  ICU Shift Summary  Date: 8/19/2020      COVID Test: (--)  Isolation: No active isolations     Prehospitalization: Home  Admit Date / LOS : 8/18/2020/ 1 days    Diagnosis: <principal problem not specified>    Consults:        Active: ID and Wound Care       Needed: hospital medicine     Code Status: Prior   Advanced Directive: <no information>    LDA: Art Line, Kerns and PIV       Central Lines/Site/Justification:Patient Does Not Have Central Line       Urinary Cath/Order/Justification:Critically Ill in the ICU and requiring intensive monitoring    Vasopressors/Infusions:    sodium chloride 0.9% Stopped (08/18/20 1555)    sodium chloride 0.9% 50 mL/hr at 08/19/20 0400          GOALS: Volume/ Hemodynamic: N/A                     RASS: N/A    Pain Management: PO       Pain Controlled: yes     Rhythm: NSR    Respiratory Device: Room Air                  Most Recent SBT/ SAT: N/A          VTE Prophylaxis: Mechanical  Mobility: Bedrest  Stress Ulcer Prophylaxis: Yes    Dietary: NPO  Tolerance: not applicable    I & O (24h):    Intake/Output Summary (Last 24 hours) at 8/19/2020 0501  Last data filed at 8/19/2020 0400  Gross per 24 hour   Intake 2690.83 ml   Output 2620 ml   Net 70.83 ml        Restraints: No    Significant Dates:  Post Op Date: 8/18/20  Rescue Date: N/A  Imaging/ Diagnostics: N/A    Noteworthy Labs:  none    CBC/Anemia Labs: Coags:    Recent Labs   Lab 08/12/20  1435 08/19/20  0330   WBC 8.04 7.94   HGB 9.4* 7.9*   HCT 30.1* 25.0*   * 333   * 99*   RDW 14.7* 14.6*    Recent Labs   Lab 08/19/20  0330   INR 1.0   APTT 27.0        Chemistries:   Recent Labs   Lab 08/12/20  1731 08/14/20  1408    143   K 3.0* 3.1*    107   CO2 25 24   BUN 28* 25*   CREATININE 3.0* 2.3*   CALCIUM 8.9 8.1*   PROT 8.9*  --    BILITOT 0.3  --    ALKPHOS 114  --    ALT 10  --    AST 16  --         Cardiac Enzymes: Ejection Fractions:    No results for input(s): CPK, CPKMB,  MB, TROPONINI in the last 72 hours. Nuc Rest EF   Date Value Ref Range Status   07/27/2020 65  Final        POCT Glucose: HbA1c:    Recent Labs   Lab 08/18/20  1410 08/18/20  1755 08/18/20  2307   POCTGLUCOSE 240* 260* 298*    Hemoglobin A1C   Date Value Ref Range Status   07/17/2020 >14.0 (H) 4.0 - 5.6 % Final     Comment:     ADA Screening Guidelines:  5.7-6.4%  Consistent with prediabetes  >or=6.5%  Consistent with diabetes  High levels of fetal hemoglobin interfere with the HbA1C  assay. Heterozygous hemoglobin variants (HbS, HgC, etc)do  not significantly interfere with this assay.   However, presence of multiple variants may affect accuracy.             ICU LOS 13h  Level of Care: OK to Transfer    Shift Summary/Plan for the shift: Remains in ICU. IVF per orders.  Feet in HOB; surgical dressing in place. Afebrile, VS WDL. Kerns draining adequate urine.  C/o pain X 1; PRN medications admin per orders.  C/o Nausea and vomiting. Neuro checks q4h completed. NPO with ice chips tolerating well. One time dose of phenergan admin X 1 with full relied of systems.  POC reviewed with pt; expressed understanding.

## 2020-08-19 NOTE — NURSING
Wound care nurse here to see patient and place orders for left heel. Left heel is necrotic and wound care nurse cleaned it with vashe  and dry island border dressing.    Jazmine removed as ordered and pressure held for 15 mins and dressing applied    Kerns cath removed.

## 2020-08-19 NOTE — ASSESSMENT & PLAN NOTE
54F with h/o CAD/PAD and chronic L heel wound admitted for reperfusion of L leg. S/p CREATION, BYPASS, ARTERIAL, FEMORAL TO POPLITEAL, USING GRAFT, LEFT LOWER EXTREMITY (Left) with Dr Huber on 8/18. Infectious disease had been following patient outpatient for IV antibiotics for osteomyelitis of L foot based on positive bone cultures growing e.faecalis, group B strep and enterobacter. Note path with viable bone without inflammatory infiltrates or other significant histopathologic abnormalities. She had some clinical improvement on zosyn, but abx stopped Friday 2/2 acute kidney injury (zosyn/torosamide combo?). Fortunately, acute kidney injury continues to improve with stopping zosyn. ID c/f abx recs.     Recommendations:   - would NOT re-challenge patient with zosyn given recent ROSLYN  - start vancomycin/cefepime while inpatient  - on discharge, resume renally dosed PO augmentin (estimated end date: 9/1/20) to complete a 6 week total course of abx given c/f osteo and 2 weeks after reperfusion  - needs outpatient wound care (podiatry?) arranged  - Advised her to reach out to cardiologist to discuss diuretic dosing and monitoring kidney function

## 2020-08-19 NOTE — PROGRESS NOTES
Pharmacist Renal Dose Adjustment Note    Suyapa Connelly is a 54 y.o. female being treated with the medication cefepime  Patient Data:    Vital Signs (Most Recent):  Temp: 99 °F (37.2 °C) (08/19/20 0800)  Pulse: 97 (08/19/20 0906)  Resp: (!) 21 (08/19/20 0906)  BP: (!) 152/66 (08/19/20 0906)  SpO2: 100 % (08/19/20 0906)   Vital Signs (72h Range):  Temp:  [97.4 °F (36.3 °C)-99 °F (37.2 °C)]   Pulse:  []   Resp:  [9-30]   BP: (126-196)/()   SpO2:  [94 %-100 %]   Arterial Line BP: (123-181)/(55-80)      Recent Labs   Lab 08/12/20  1731 08/14/20  1408 08/19/20  0330   CREATININE 3.0* 2.3* 1.6*     Serum creatinine: 1.6 mg/dL (H) 08/19/20 0330  Estimated creatinine clearance: 44.9 mL/min (A)    Medication: cefepime 1gm q12 will be changed to medication: cefepime 2gm q12hr    Pharmacist's Name: Dianne Mathis  Pharmacist's Extension: [phone]

## 2020-08-19 NOTE — CARE UPDATE
[facility]  ICU Shift Summary  Date: 8/18/2020      COVID Test: (--)  Isolation: No active isolations     Prehospitalization: Home  Admit Date / LOS : 8/18/2020/ 0 days    Diagnosis: <principal problem not specified>    Consults:        Active: ID and Wound Care       Needed: N/A     Code Status: Prior   Advanced Directive: <no information>    LDA: Art Line, Kerns and PIV       Central Lines/Site/Justification:Patient Does Not Have Central Line       Urinary Cath/Order/Justification:Critically Ill in the ICU and requiring intensive monitoring    Vasopressors/Infusions:    sodium chloride 0.9% Stopped (08/18/20 1555)    sodium chloride 0.9% 50 mL/hr at 08/18/20 1800          GOALS: Volume/ Hemodynamic: N/A                     RASS: 0  alert and calm    Pain Management: IV       Pain Controlled: no     Rhythm: NSR    Respiratory Device: Room Air                  Most Recent SBT/ SAT: N/A       MOVE Screen: FAIL    VTE Prophylaxis: Pharm  Mobility: Bedrest  Stress Ulcer Prophylaxis: Yes    Dietary: NPO  Tolerance: not applicable  /  Advancement: no    I & O (24h):    Intake/Output Summary (Last 24 hours) at 8/18/2020 1908  Last data filed at 8/18/2020 1800  Gross per 24 hour   Intake 2190.83 ml   Output 1925 ml   Net 265.83 ml        Restraints: No    Significant Dates:  Post Op Date: 8/18/20  Rescue Date: N/A  Imaging/ Diagnostics: N/A    Noteworthy Labs:  none    CBC/Anemia Labs: Coags:    Recent Labs   Lab 08/12/20  1435   WBC 8.04   HGB 9.4*   HCT 30.1*   *   *   RDW 14.7*    No results for input(s): PT, INR, APTT in the last 168 hours.     Chemistries:   Recent Labs   Lab 08/12/20  1731 08/14/20  1408    143   K 3.0* 3.1*    107   CO2 25 24   BUN 28* 25*   CREATININE 3.0* 2.3*   CALCIUM 8.9 8.1*   PROT 8.9*  --    BILITOT 0.3  --    ALKPHOS 114  --    ALT 10  --    AST 16  --         Cardiac Enzymes: Ejection Fractions:    No results for input(s): CPK, CPKMB, MB,  TROPONINI in the last 72 hours. Nuc Rest EF   Date Value Ref Range Status   07/27/2020 65  Final        POCT Glucose: HbA1c:    Recent Labs   Lab 08/18/20  0613 08/18/20  1410 08/18/20  1755   POCTGLUCOSE 238* 240* 260*    Hemoglobin A1C   Date Value Ref Range Status   07/17/2020 >14.0 (H) 4.0 - 5.6 % Final     Comment:     ADA Screening Guidelines:  5.7-6.4%  Consistent with prediabetes  >or=6.5%  Consistent with diabetes  High levels of fetal hemoglobin interfere with the HbA1C  assay. Heterozygous hemoglobin variants (HbS, HgC, etc)do  not significantly interfere with this assay.   However, presence of multiple variants may affect accuracy.             ICU LOS 3h  Level of Care: OK to Transfer    Shift Summary/Plan for the shift: none

## 2020-08-19 NOTE — NURSING
Pt arrived to unit by bed.accompanied by transport and the nurse. Tele monitoring initiated Assessment initiated. Pt is AAO ...... No distress noted.

## 2020-08-19 NOTE — NURSING
Received patient from ICU. Awake and alert, respirations even non-labored. On R/A. #20 g h/l intact to the RAC. #16 h/l to the rt forearm. Infusing NS 50 cc's hr. Dressing intact to the lft inner thigh with a scant amount of drainage. Dressing to the rt groin clean dry and intact. Dressing intact to the left heel. Radials 1+ pedals doppler. Was last medicated for pain with oxycodone at 4:47 pm.

## 2020-08-19 NOTE — NURSING TRANSFER
Nursing Transfer Note      8/19/2020     Transfer To: Room 309    Transfer via bed    Transfer with cardiac monitoring    Transported by Nurse and Transport    Medicines sent: yes  Chart send with patient: Yes    Notified: spouse    Patient reassessed at: 08/19/2020 1130 am    Upon arrival to floor: patient oriented to room, call bell in reach and bed in lowest position

## 2020-08-19 NOTE — HPI
54F with h/o CAD/PAD with recent cardiac stent with plans for outpatient revascularization of lower extremity in next few weeks. Patient with osteomyelitis of L foot based on positive bone cultures growing e.faecalis, group B strep and enterobacter. Note path with viable bone without inflammatory infiltrates or other significant histopathologic abnormalities. Some clinical improvement on zosyn. Outpatient iv antibiotic course c/b acute kidney injury; unclear if from zosyn or diuretics (was taking 20mg bid torosamide).  zosyn stopped in id clinic last week and pt started on po augmentin. Admitted for vascular suregery. Now S/p CREATION, BYPASS, ARTERIAL, FEMORAL TO POPLITEAL, USING GRAFT, LEFT LOWER EXTREMITY (Left) with Dr Huber on 8/18    Patient seen  in ICU. she Reports some pain at surgical site. Otherwise  Denies complaints. Says her foot had an odor to it last admit, but that has now resolved.     ID c/f abx recs

## 2020-08-19 NOTE — CONSULTS
[facility]  Infectious Disease  Consult Note    Patient Name: Suyapa Connelly  MRN: [id number]  Admission Date: 8/18/2020  Hospital Length of Stay: 1 days  Attending Physician: Teddy Huber MD  Primary Care Provider: Erlinda Rahman NP     Isolation Status: No active isolations    Patient information was obtained from patient and ER records.      Inpatient consult to Infectious Diseases  Consult performed by: Lindsay De Anda MD  Consult ordered by: Teddy Huber MD        Assessment/Plan:     Non healing left heel wound  54F with h/o CAD/PAD and chronic L heel wound admitted for reperfusion of L leg. S/p CREATION, BYPASS, ARTERIAL, FEMORAL TO POPLITEAL, USING GRAFT, LEFT LOWER EXTREMITY (Left) with Dr Huber on 8/18. Infectious disease had been following patient outpatient for IV antibiotics for osteomyelitis of L foot based on positive bone cultures growing e.faecalis, group B strep and enterobacter. Note path with viable bone without inflammatory infiltrates or other significant histopathologic abnormalities. She had some clinical improvement on zosyn, but abx stopped Friday 2/2 acute kidney injury (zosyn/torosamide combo?). Fortunately, acute kidney injury continues to improve with stopping zosyn. ID c/f abx recs.     Recommendations:   - would NOT re-challenge patient with zosyn given recent ROSLYN  - start vancomycin/cefepime while inpatient  - on discharge, resume renally dosed PO augmentin (estimated end date: 9/1/20) to complete a 6 week total course of abx given c/f osteo and 2 weeks after reperfusion  - needs outpatient wound care (podiatry?) arranged  - Advised her to reach out to cardiologist to discuss diuretic dosing and monitoring kidney function          Thank you for your consult.     Lindsay De Anda MD  Infectious Disease  [facility]    Subjective:     Principal Problem: <principal problem not specified>    HPI: 54F with h/o CAD/PAD with recent  cardiac stent with plans for outpatient revascularization of lower extremity in next few weeks. Patient with osteomyelitis of L foot based on positive bone cultures growing e.faecalis, group B strep and enterobacter. Note path with viable bone without inflammatory infiltrates or other significant histopathologic abnormalities. Some clinical improvement on zosyn. Outpatient iv antibiotic course c/b acute kidney injury; unclear if from zosyn or diuretics (was taking 20mg bid torosamide).  zosyn stopped in id clinic last week and pt started on po augmentin. Admitted for vascular suregery. Now S/p CREATION, BYPASS, ARTERIAL, FEMORAL TO POPLITEAL, USING GRAFT, LEFT LOWER EXTREMITY (Left) with Dr Huber on 8/18    Patient seen  in ICU. she Reports some pain at surgical site. Otherwise  Denies complaints. Says her foot had an odor to it last admit, but that has now resolved.     ID c/f abx recs    Past Medical History:   Diagnosis Date    Anxiety     Depression     Diabetes mellitus     type 2    Diabetes mellitus, type 2     GERD (gastroesophageal reflux disease)     Hyperlipemia     Hypertension     Myocardial infarction 2010    minor-caused by stress per pt.       Past Surgical History:   Procedure Laterality Date    Angiogram - Right Extremity Right 7/9/15    angiogram-left leg  10/6/15    CATHETERIZATION OF BOTH LEFT AND RIGHT HEART N/A 12/18/2019    Procedure: CATHETERIZATION, HEART, BOTH LEFT AND RIGHT;  Surgeon: Que Fernando III, MD;  Location: [facility] CATH LAB;  Service: Cardiology;  Laterality: N/A;    CORONARY ANGIOGRAPHY N/A 12/18/2019    Procedure: ANGIOGRAM, CORONARY ARTERY;  Surgeon: Que Fernando III, MD;  Location: [facility] CATH LAB;  Service: Cardiology;  Laterality: N/A;    CORONARY ANGIOGRAPHY INCLUDING BYPASS GRAFTS WITH CATHETERIZATION OF LEFT HEART N/A 7/28/2020    Procedure: ANGIOGRAM, CORONARY, INCLUDING BYPASS GRAFT, WITH LEFT HEART CATHETERIZATION, 9 am;  Surgeon: Rachel VELIZ  MD Tracee;  Location: [facility] CATH LAB;  Service: Cardiology;  Laterality: N/A;    CORONARY ARTERY BYPASS GRAFT (CABG) N/A 1/14/2020    Procedure: CORONARY ARTERY BYPASS GRAFT (CABG) x 1     Off Pump;  Surgeon: Huang Altamirano MD;  Location: [address] FLR;  Service: Cardiovascular;  Laterality: N/A;    CREATION OF FEMOROPOPLITEAL ARTERIAL BYPASS USING GRAFT Left 8/18/2020    Procedure: CREATION, BYPASS, ARTERIAL, FEMORAL TO POPLITEAL, USING GRAFT, LEFT LOWER EXTREMITY;  Surgeon: Teddy Huber MD;  Location: [facility];  Service: Vascular;  Laterality: Left;  REQUEST 7:15 A.M. START----COVID NEGATIVE ON 8/17  1ST CASE STARTE PER LEANA ON 8/7/2020 @ 942AM-LO  RN PREOP 8/12/2020   T/S-----CLEARED BY CARDS-------PENDING INSURANCE    INSERTION OF TUNNELED CENTRAL VENOUS HEMODIALYSIS CATHETER N/A 1/27/2020    Procedure: Insertion, Catheter, Central Venous, Hemodialysis;  Surgeon: ESTEBAN Gomez III, MD;  Location: [facility] CATH LAB;  Service: Peripheral Vascular;  Laterality: N/A;       Review of patient's allergies indicates:   Allergen Reactions    Ciprofloxacin      Itchiness    Contrast media      Kidney injury       Medications:  Medications Prior to Admission   Medication Sig    acetaminophen (TYLENOL) 500 MG tablet Take 1,000 mg by mouth daily as needed for Pain.    allopurinoL (ZYLOPRIM) 100 MG tablet Take 1 tablet (100 mg total) by mouth once daily.    amiodarone (PACERONE) 400 MG tablet Take 1 tablet (400 mg total) by mouth once daily.    amoxicillin-clavulanate 500-125mg (AUGMENTIN) 500-125 mg Tab Take 1 tablet (500 mg total) by mouth 2 (two) times daily. for 16 days    aspirin 81 MG Chew Take 1 tablet (81 mg total) by mouth once daily.    atorvastatin (LIPITOR) 80 MG tablet Take 1 tablet (80 mg total) by mouth once daily.    carvediloL (COREG) 6.25 MG tablet Take 6.25 mg by mouth once daily. Pt thinks she take BID. Unsure.    clopidogreL (PLAVIX) 75 mg tablet Take 1 tablet (75 mg total) by  mouth once daily.    gabapentin (NEURONTIN) 300 MG capsule Take 1 capsule (300 mg total) by mouth 3 (three) times daily.    hydrALAZINE (APRESOLINE) 25 MG tablet Take 1 tablet (25 mg total) by mouth every 8 (eight) hours.    insulin aspart U-100 (NOVOLOG) 100 unit/mL injection Inject 9 Units into the skin 3 (three) times daily with meals.    insulin glargine 100 units/mL (3mL) SubQ pen Inject 10 Units into the skin every evening. (Patient taking differently: Inject 10 Units into the skin every evening. Lantus)    multivitamin (THERAGRAN) per tablet Take 1 tablet by mouth once daily.    ondansetron (ZOFRAN-ODT) 4 MG TbDL Take 1 tablet (4 mg total) by mouth every 12 (twelve) hours as needed (vomiting).    oxyCODONE-acetaminophen (PERCOCET) 5-325 mg per tablet Take 1 tablet by mouth every 6 (six) hours as needed for Pain.    blood sugar diagnostic (ONETOUCH ULTRA BLUE TEST STRIP) Strp Use to test blood glucose twice daily (Patient taking differently: Tests 3 times daily)    escitalopram oxalate (LEXAPRO) 10 MG tablet Take 1 tablet (10 mg total) by mouth once daily.    lancets 30 gauge Misc use to test blood glucose 2 (two) times daily with meals.    piperacillin sodium/tazobactam (PIPERACILLIN-TAZOBACTAM 4.5G/100ML SODIUM CHLORIDE 0.9%-READY TO MIX) Inject 100 mLs (4.5 g total) into the vein every 8 (eight) hours.    sevelamer carbonate (RENVELA) 800 mg Tab Take 1 tablet (800 mg total) by mouth 3 (three) times daily with meals.    torsemide (DEMADEX) 20 MG Tab Take 20 mg by mouth once daily.      Antibiotics (From admission, onward)    Start     Stop Route Frequency Ordered    08/20/20 1230  vancomycin 1.25 g in dextrose 5% 250 mL IVPB (ready to mix)      -- IV Every 24 hours (non-standard times) 08/19/20 1249    08/19/20 1130  cefepime in dextrose 5 % IVPB 2 g      -- IV Every 12 hours (non-standard times) 08/19/20 1017    08/19/20 1108  vancomycin - pharmacy to dose  (vancomycin IVPB)      -- IV pharmacy  to manage frequency 08/19/20 1009    08/18/20 2100  mupirocin 2 % ointment      08/23 2059 Nasl 2 times daily 08/18/20 1414        Antifungals (From admission, onward)    None        Antivirals (From admission, onward)    None             There is no immunization history on file for this patient.    Family History     None        Social History     Socioeconomic History    Marital status: Legally      Spouse name: Not on file    Number of children: Not on file    Years of education: Not on file    Highest education level: Not on file   Occupational History    Not on file   Social Needs    Financial resource strain: Not on file    Food insecurity     Worry: Not on file     Inability: Not on file    Transportation needs     Medical: Not on file     Non-medical: Not on file   Tobacco Use    Smoking status: Never Smoker    Smokeless tobacco: Never Used   Substance and Sexual Activity    Alcohol use: No    Drug use: Yes     Types: Marijuana     Comment: used yesterday    Sexual activity: Not on file   Lifestyle    Physical activity     Days per week: Not on file     Minutes per session: Not on file    Stress: Not on file   Relationships    Social connections     Talks on phone: Not on file     Gets together: Not on file     Attends [employer] service: Not on file     Active member of club or organization: Not on file     Attends meetings of clubs or organizations: Not on file     Relationship status: Not on file   Other Topics Concern    Not on file   Social History Narrative    Not on file     Review of Systems   Constitutional: Negative for chills and fever.   Respiratory: Negative for cough and shortness of breath.    Cardiovascular: Positive for leg swelling.   Gastrointestinal: Negative for abdominal distention and abdominal pain.   Endocrine: Negative for polyuria.   Genitourinary: Negative for dysuria, frequency and urgency.   Skin: Positive for wound.        L heel   Neurological:  Negative for headaches.     Objective:     Vital Signs (Most Recent):  Temp: 99 °F (37.2 °C) (08/19/20 0800)  Pulse: 90 (08/19/20 1300)  Resp: 20 (08/19/20 1300)  BP: (!) 168/77 (08/19/20 1457)  SpO2: 100 % (08/19/20 1300) Vital Signs (24h Range):  Temp:  [97.5 °F (36.4 °C)-99 °F (37.2 °C)] 99 °F (37.2 °C)  Pulse:  [] 90  Resp:  [9-37] 20  SpO2:  [98 %-100 %] 100 %  BP: (126-182)/() 168/77  Arterial Line BP: (118-181)/() 146/64     Weight: 89.4 kg (197 lb 1.5 oz)  Body mass index is 32.8 kg/m².    Estimated Creatinine Clearance: 44.9 mL/min (A) (based on SCr of 1.6 mg/dL (H)).    Physical Exam  Constitutional:       General: She is not in acute distress.     Appearance: Normal appearance. She is not ill-appearing.   Cardiovascular:      Rate and Rhythm: Normal rate and regular rhythm.      Pulses: Normal pulses.      Heart sounds: Normal heart sounds.   Pulmonary:      Effort: Pulmonary effort is normal.      Breath sounds: Normal breath sounds.   Abdominal:      General: Abdomen is flat. There is no distension.      Palpations: Abdomen is soft.   Skin:     General: Skin is warm and dry.      Comments: Surgical wounds in LLE c/d/i. Good pulses in L leg. Dry L heal wound, no drainage.   Neurological:      Mental Status: She is alert.             Significant Labs: All pertinent labs within the past 24 hours have been reviewed.    Significant Imaging: I have reviewed all pertinent imaging results/findings within the past 24 hours.

## 2020-08-19 NOTE — OP NOTE
DATE OF PROCEDURE:  8/18/20     PREOPERATIVE DIAGNOSIS:   1. Atherosclerosis of the left lower extremity with left heel wound with necrosis of underlying bone and muscle  2. HTN  3. HLD  4. DM  5. Obesity      POSTOPERATIVE DIAGNOSIS:    1. same    PROCEDURE: Left common femoral artery to below knee popliteal bypass with 6mm Propaten graft     SURGEON:  Teddy Huber M.D.     ASSISTANT:    1. Clem Mccormick MD  2. Lulu Goff MS3     ANESTHESIA: General     ESTIMATED BLOOD LOSS:  75 mL.     SPECIMEN:  None     COMPLICATIONS:  None.     PROCEDURE IN DETAIL:  After obtaining consent, he was brought to the Operating Room.  She was placed supine on operating room table.  General anesthesia was induced the patient was intubated without complications.  The pedal signals were marked.  Kerns catheter was placed and the patient was prepped and draped in sterile fashion.   The left leg was circumferentially prepped and draped in the usual sterile fashion.  We began by making a longitudinal incision overlying the left common femoral artery with a scalpel.  This was deepened with the electrocautery.  The femoral sheath was entered with Metzenbaum scissors.  The SFA was initially exposed.  The left groin was noted to be severely scarred in visualization of the common femoral artery with difficulty as well as the origin of the profunda.  The proximal profunda was exposed.  Next the proximal common femoral artery and distal external iliac artery was exposed with sharp dissection although we were unable to obtain circumferential control.  Branches of the common femoral artery were controlled with vessel loops and small branches were ligated.  Significant venous branches were overlying the femoral sheath and these were controlled with clips, silk ties and electrocautery.  After the femoral vasculature was exposed and clamp sites were confirmed with placed a wet lap pad in the groin wound and removed our retractors.  We then  performed an incision with a scalpel 1 fingerbreadth posterior to the level of the tibia on the medial aspect of the lower leg to expose the below-knee popliteal artery.  This was deepened with electrocautery in the bloody popliteal artery was exposed Metzenbaum scissors.  Vessel loops were placed around the artery.  We then performed a superficial tunnel and heparinized the patient systemically.  Next we brought our graft into the field.  We confirmed therapeutic anticoagulation with ACT levels and re-dosed appropriately throughout the case.  We then clamped the profunda followed by the proximal common femoral artery.  Eleven blade was used to make an arteriotomy overlying the distal aspect of the common femoral artery.  This was opened with Damon scissors and the artery was noted to be adequate for proximal anastomosis.  Our graft was cut for appropriate bevel we performed our proximal anastomosis with a 5 0 C1 continuous suture.  Prior to final not be secured we flushed our clamps and plantar graft.  We then released our clamps distally follow-up approximately in place Surgicel overlying the anastomosis.  Manual pressure was applied gently and 1 repair stitch was needed with a 6 0 suture to the toe.  Hemostasis was achieved.  We then passed our graft into the tunneler to prevent any twisting and kinking.  We then clamped the proximal aspect of the graft and clamped our popliteal artery distal follow-up proximal.  Reported arteriotomy with 11 blade and extended with Damon scissors.  We cut our graft appropriate length and beveled and performed our distal anastomosis 6 0 C1 Prolene suture.  Prior to our follow-up being secured we flushed our vessels and flushed arteriotomy with heparinized saline.  We then removed our clamps in place Surgicel overlying the anastomosis.  Of note the suture broke with sewing the front wall and this was removed the with suturing a new suture at the same level and tying to the remnant  previous suture.  We then injected signal in the ft and was noted to be triphasic with significant augmentation after our graft clamp was released.  We then listened to the distal popliteal artery and this was noted to be triphasic.  We reversed the patient with a total of 55 mg of protamine and hemostasis was achieved.  The wounds were irrigated with saline.  The below-knee popliteal artery was closed with 2 running Vicryl suture to the subdermal level of the skin was closed with staples.  The groin was closed in multiple layers with 2 Vicryl deep layer and 3 0 Vicryl to the subdermal layer with careful attention to not kink the graft.  The skin was closed with staples as well.  Sterile dressings were applied to the incisions.  The patient was awoken from general anesthesia and transferred to the recovery room in stable condition.

## 2020-08-19 NOTE — PLAN OF CARE
08/19/20 1050   Discharge Assessment   Assessment Type Discharge Planning Assessment   Confirmed/corrected address and phone number on facesheet? Yes   Assessment information obtained from? Patient   Prior to hospitilization cognitive status: Alert/Oriented   Prior to hospitalization functional status: Assistive Equipment;Needs Assistance   Current cognitive status: Alert/Oriented   Current Functional Status: Assistive Equipment;Needs Assistance   Facility Arrived From: home   Lives With child(isra), adult   Able to Return to Prior Arrangements yes   Is patient able to care for self after discharge? Yes   Who are your caregiver(s) and their phone number(s)? Cornelia [phone]   Patient's perception of discharge disposition home or selfcare   Readmission Within the Last 30 Days previous discharge plan unsuccessful   If yes, most recent facility name: [facility]   Patient currently being followed by outpatient case management? No   Patient currently receives any other outside agency services? No   Equipment Currently Used at Home wheelchair;bedside commode   Do you have any problems affording any of your prescribed medications? No   Is the patient taking medications as prescribed? yes   Does the patient have transportation home? Yes   Transportation Anticipated car, drives self;family or friend will provide   Dialysis Name and Scheduled days N/A   Does the patient receive services at the Coumadin Clinic? No   Discharge Plan A Home with family;Home Health   Discharge Plan B Home with family   DME Needed Upon Discharge  cane, quad;walker, rolling   Patient/Family in Agreement with Plan yes   Readmission Questionnaire   At the time of your discharge, did someone talk to you about what your health problems were? Yes   At the time of discharge, did someone talk to you about what to watch out for regarding worsening of your health problem? Yes   At the time of discharge, did someone talk to you about what to do if  you experienced worsening of your health problem? Yes   At the time of discharge, did someone talk to you about which medication to take when you left the hospital and which ones to stop taking? Yes   At the time of discharge, did someone talk to you about when and where to follow up with a doctor after you left the hospital? Yes   How often do you need to have someone help you when you read instructions, pamphlets, or other written material from your doctor or pharmacy? Always   Do you have problems taking your medications as prescribed? Yes   Do you have any problems affording any of  your prescribed medications? Yes   Do you have problems obtaining/receiving your medications? No   Does the patient have transportation to healthcare appointments? Yes   Living Arrangements house   Does the patient have family/friends to help with healtcare needs after discharge? yes   Does your caregiver provide all the help you need? Yes   Are you currently feeling confused? No   Are you currently having problems thinking? No   Are you currently having memory problems? No         [id number] - [place] - [address] [facility]  [address]  [address] [address]  Phone: [phone] Fax: [phone]

## 2020-08-19 NOTE — CONSULTS
Consulted for wound left heel; post vascular surgery  A 54 year old female admitted 8/18/20 from home for left femoral below knee popliteal bypass  PMH: Gangrene of toe; ROSLYN; PAD; right foot pain; PVD; PAD; DM II with long term current use of insulin; panic attack; malnutrition of moderate degree; essential hypertension; generalized anxiety disorder; major depressive disorder; injury to kidney; chronic combined systolic and diastolic heart failure; mixed hyperlipidemia; dilated cardiomyopathy; acute coronary syndrome; contrast dye induced nephropathy; S/P CABG x 1 1/14/20; acute blood loss anemia; A fib; alteration in skin integrity; ischemic cardiomyopathy; non healing left heel wound; acute osteomyelitis of left calcaneus; CKD Stage 3; critical lower limb ischemia  Patient reported wound started as a blister in Jan 2020 after a coma for 11 days following CABG; discharged to Rehab; treated wound herself at home  7/18 Podiatry consult- bedside debridement and bone biopsy per Podiatry  7/20 Vascular consult  7/30 Discharged home on IV antibiotics for 6 weeks and outpatient wound care- Iodosorb daily as per Podiatry orders  On Isolibrium mattress with EHOB boots bilaterally; states has boot given for ambulation  Assessment:  Photodocumentation    Left heel pressure injury- Unstageable wound with dry necrotic base. Eschar 5 cm(L) 5.5 cm(W). No drainage. No surrounding erythema.   Treatment:  During post op period, keep eschar clean and dry and continue to offload pressure. Treatment plan discussed with patient and nursing.

## 2020-08-19 NOTE — PLAN OF CARE
Patient had surgery yesterday with bypass done for poor circulation to left leg and foot. Vital signs have been very stable and Jazmine discontinued per orders.. New orders received to transfer to tele and start therapy. Wound care nurse redressed necrotic left heel. Kerns cath discontinued and patient started on clear liquids. No falls or injuries this shift No skin breakdown this shift. Doppler pulses heard on lower extremities . Orders received to transfer to tele this afternoon.

## 2020-08-19 NOTE — SUBJECTIVE & OBJECTIVE
Past Medical History:   Diagnosis Date    Anxiety     Depression     Diabetes mellitus     type 2    Diabetes mellitus, type 2     GERD (gastroesophageal reflux disease)     Hyperlipemia     Hypertension     Myocardial infarction 2010    minor-caused by stress per pt.       Past Surgical History:   Procedure Laterality Date    Angiogram - Right Extremity Right 7/9/15    angiogram-left leg  10/6/15    CATHETERIZATION OF BOTH LEFT AND RIGHT HEART N/A 12/18/2019    Procedure: CATHETERIZATION, HEART, BOTH LEFT AND RIGHT;  Surgeon: Que Fernando III, MD;  Location: [facility] CATH LAB;  Service: Cardiology;  Laterality: N/A;    CORONARY ANGIOGRAPHY N/A 12/18/2019    Procedure: ANGIOGRAM, CORONARY ARTERY;  Surgeon: Que Fernando III, MD;  Location: [facility] CATH LAB;  Service: Cardiology;  Laterality: N/A;    CORONARY ANGIOGRAPHY INCLUDING BYPASS GRAFTS WITH CATHETERIZATION OF LEFT HEART N/A 7/28/2020    Procedure: ANGIOGRAM, CORONARY, INCLUDING BYPASS GRAFT, WITH LEFT HEART CATHETERIZATION, 9 am;  Surgeon: Rachel Easley MD;  Location: [facility] CATH LAB;  Service: Cardiology;  Laterality: N/A;    CORONARY ARTERY BYPASS GRAFT (CABG) N/A 1/14/2020    Procedure: CORONARY ARTERY BYPASS GRAFT (CABG) x 1     Off Pump;  Surgeon: Huang Altamirano MD;  Location: [address];  Service: Cardiovascular;  Laterality: N/A;    CREATION OF FEMOROPOPLITEAL ARTERIAL BYPASS USING GRAFT Left 8/18/2020    Procedure: CREATION, BYPASS, ARTERIAL, FEMORAL TO POPLITEAL, USING GRAFT, LEFT LOWER EXTREMITY;  Surgeon: Teddy Huber MD;  Location: [facility];  Service: Vascular;  Laterality: Left;  REQUEST 7:15 A.M. START----COVID NEGATIVE ON 8/17  1ST CASE STARTE PER LEANA ON 8/7/2020 @ 942AM-LO  RN PREOP 8/12/2020   T/S-----CLEARED BY CARDS-------PENDING INSURANCE    INSERTION OF TUNNELED CENTRAL VENOUS HEMODIALYSIS CATHETER N/A 1/27/2020    Procedure: Insertion, Catheter, Central Venous, Hemodialysis;  Surgeon: ESTEBAN BLANCA  Patricia KESSLER MD;  Location: [facility] CATH LAB;  Service: Peripheral Vascular;  Laterality: N/A;       Review of patient's allergies indicates:   Allergen Reactions    Ciprofloxacin      Itchiness    Contrast media      Kidney injury       Medications:  Medications Prior to Admission   Medication Sig    acetaminophen (TYLENOL) 500 MG tablet Take 1,000 mg by mouth daily as needed for Pain.    allopurinoL (ZYLOPRIM) 100 MG tablet Take 1 tablet (100 mg total) by mouth once daily.    amiodarone (PACERONE) 400 MG tablet Take 1 tablet (400 mg total) by mouth once daily.    amoxicillin-clavulanate 500-125mg (AUGMENTIN) 500-125 mg Tab Take 1 tablet (500 mg total) by mouth 2 (two) times daily. for 16 days    aspirin 81 MG Chew Take 1 tablet (81 mg total) by mouth once daily.    atorvastatin (LIPITOR) 80 MG tablet Take 1 tablet (80 mg total) by mouth once daily.    carvediloL (COREG) 6.25 MG tablet Take 6.25 mg by mouth once daily. Pt thinks she take BID. Unsure.    clopidogreL (PLAVIX) 75 mg tablet Take 1 tablet (75 mg total) by mouth once daily.    gabapentin (NEURONTIN) 300 MG capsule Take 1 capsule (300 mg total) by mouth 3 (three) times daily.    hydrALAZINE (APRESOLINE) 25 MG tablet Take 1 tablet (25 mg total) by mouth every 8 (eight) hours.    insulin aspart U-100 (NOVOLOG) 100 unit/mL injection Inject 9 Units into the skin 3 (three) times daily with meals.    insulin glargine 100 units/mL (3mL) SubQ pen Inject 10 Units into the skin every evening. (Patient taking differently: Inject 10 Units into the skin every evening. Lantus)    multivitamin (THERAGRAN) per tablet Take 1 tablet by mouth once daily.    ondansetron (ZOFRAN-ODT) 4 MG TbDL Take 1 tablet (4 mg total) by mouth every 12 (twelve) hours as needed (vomiting).    oxyCODONE-acetaminophen (PERCOCET) 5-325 mg per tablet Take 1 tablet by mouth every 6 (six) hours as needed for Pain.    blood sugar diagnostic (ONETOUCH ULTRA BLUE TEST STRIP) Strp  Use to test blood glucose twice daily (Patient taking differently: Tests 3 times daily)    escitalopram oxalate (LEXAPRO) 10 MG tablet Take 1 tablet (10 mg total) by mouth once daily.    lancets 30 gauge Misc use to test blood glucose 2 (two) times daily with meals.    piperacillin sodium/tazobactam (PIPERACILLIN-TAZOBACTAM 4.5G/100ML SODIUM CHLORIDE 0.9%-READY TO MIX) Inject 100 mLs (4.5 g total) into the vein every 8 (eight) hours.    sevelamer carbonate (RENVELA) 800 mg Tab Take 1 tablet (800 mg total) by mouth 3 (three) times daily with meals.    torsemide (DEMADEX) 20 MG Tab Take 20 mg by mouth once daily.      Antibiotics (From admission, onward)    Start     Stop Route Frequency Ordered    08/20/20 1230  vancomycin 1.25 g in dextrose 5% 250 mL IVPB (ready to mix)      -- IV Every 24 hours (non-standard times) 08/19/20 1249    08/19/20 1130  cefepime in dextrose 5 % IVPB 2 g      -- IV Every 12 hours (non-standard times) 08/19/20 1017    08/19/20 1108  vancomycin - pharmacy to dose  (vancomycin IVPB)      -- IV pharmacy to manage frequency 08/19/20 1009    08/18/20 2100  mupirocin 2 % ointment      08/23 2059 Nasl 2 times daily 08/18/20 1414        Antifungals (From admission, onward)    None        Antivirals (From admission, onward)    None             There is no immunization history on file for this patient.    Family History     None        Social History     Socioeconomic History    Marital status: Legally      Spouse name: Not on file    Number of children: Not on file    Years of education: Not on file    Highest education level: Not on file   Occupational History    Not on file   Social Needs    Financial resource strain: Not on file    Food insecurity     Worry: Not on file     Inability: Not on file    Transportation needs     Medical: Not on file     Non-medical: Not on file   Tobacco Use    Smoking status: Never Smoker    Smokeless tobacco: Never Used   Substance and Sexual  Activity    Alcohol use: No    Drug use: Yes     Types: Marijuana     Comment: used yesterday    Sexual activity: Not on file   Lifestyle    Physical activity     Days per week: Not on file     Minutes per session: Not on file    Stress: Not on file   Relationships    Social connections     Talks on phone: Not on file     Gets together: Not on file     Attends Scientologist service: Not on file     Active member of club or organization: Not on file     Attends meetings of clubs or organizations: Not on file     Relationship status: Not on file   Other Topics Concern    Not on file   Social History Narrative    Not on file     Review of Systems   Constitutional: Negative for chills and fever.   Respiratory: Negative for cough and shortness of breath.    Cardiovascular: Positive for leg swelling.   Gastrointestinal: Negative for abdominal distention and abdominal pain.   Endocrine: Negative for polyuria.   Genitourinary: Negative for dysuria, frequency and urgency.   Skin: Positive for wound.        L heel   Neurological: Negative for headaches.     Objective:     Vital Signs (Most Recent):  Temp: 99 °F (37.2 °C) (08/19/20 0800)  Pulse: 90 (08/19/20 1300)  Resp: 20 (08/19/20 1300)  BP: (!) 168/77 (08/19/20 1457)  SpO2: 100 % (08/19/20 1300) Vital Signs (24h Range):  Temp:  [97.5 °F (36.4 °C)-99 °F (37.2 °C)] 99 °F (37.2 °C)  Pulse:  [] 90  Resp:  [9-37] 20  SpO2:  [98 %-100 %] 100 %  BP: (126-182)/() 168/77  Arterial Line BP: (118-181)/() 146/64     Weight: 89.4 kg (197 lb 1.5 oz)  Body mass index is 32.8 kg/m².    Estimated Creatinine Clearance: 44.9 mL/min (A) (based on SCr of 1.6 mg/dL (H)).    Physical Exam  Constitutional:       General: She is not in acute distress.     Appearance: Normal appearance. She is not ill-appearing.   Cardiovascular:      Rate and Rhythm: Normal rate and regular rhythm.      Pulses: Normal pulses.      Heart sounds: Normal heart sounds.   Pulmonary:      Effort:  Pulmonary effort is normal.      Breath sounds: Normal breath sounds.   Abdominal:      General: Abdomen is flat. There is no distension.      Palpations: Abdomen is soft.   Skin:     General: Skin is warm and dry.      Comments: Surgical wounds in LLE c/d/i. Good pulses in L leg. Dry L heal wound, no drainage.   Neurological:      Mental Status: She is alert.             Significant Labs: All pertinent labs within the past 24 hours have been reviewed.    Significant Imaging: I have reviewed all pertinent imaging results/findings within the past 24 hours.

## 2020-08-20 PROBLEM — R06.02 SHORTNESS OF BREATH: Status: RESOLVED | Noted: 2019-12-06 | Resolved: 2020-08-20

## 2020-08-20 PROBLEM — I16.0 HYPERTENSIVE URGENCY: Status: RESOLVED | Noted: 2018-07-20 | Resolved: 2020-08-20

## 2020-08-20 PROBLEM — G93.40 ACUTE ENCEPHALOPATHY: Status: RESOLVED | Noted: 2018-05-05 | Resolved: 2020-08-20

## 2020-08-20 PROBLEM — R11.2 NON-INTRACTABLE VOMITING WITH NAUSEA: Status: RESOLVED | Noted: 2018-07-20 | Resolved: 2020-08-20

## 2020-08-20 PROBLEM — N30.01 ACUTE CYSTITIS WITH HEMATURIA: Status: RESOLVED | Noted: 2018-06-02 | Resolved: 2020-08-20

## 2020-08-20 PROBLEM — R55 SYNCOPE: Status: RESOLVED | Noted: 2018-07-19 | Resolved: 2020-08-20

## 2020-08-20 PROBLEM — E86.0 DEHYDRATION: Status: RESOLVED | Noted: 2018-06-01 | Resolved: 2020-08-20

## 2020-08-20 LAB
ANION GAP SERPL CALC-SCNC: 9 MMOL/L (ref 8–16)
APTT BLDCRRT: 29.4 SEC (ref 21–32)
BASOPHILS # BLD AUTO: 0.02 K/UL (ref 0–0.2)
BASOPHILS NFR BLD: 0.2 % (ref 0–1.9)
BUN SERPL-MCNC: 22 MG/DL (ref 6–20)
CALCIUM SERPL-MCNC: 7.9 MG/DL (ref 8.7–10.5)
CHLORIDE SERPL-SCNC: 104 MMOL/L (ref 95–110)
CO2 SERPL-SCNC: 25 MMOL/L (ref 23–29)
CREAT SERPL-MCNC: 1.6 MG/DL (ref 0.5–1.4)
DIFFERENTIAL METHOD: ABNORMAL
EOSINOPHIL # BLD AUTO: 0.5 K/UL (ref 0–0.5)
EOSINOPHIL NFR BLD: 4.3 % (ref 0–8)
ERYTHROCYTE [DISTWIDTH] IN BLOOD BY AUTOMATED COUNT: 14.3 % (ref 11.5–14.5)
EST. GFR  (AFRICAN AMERICAN): 42 ML/MIN/1.73 M^2
EST. GFR  (NON AFRICAN AMERICAN): 37 ML/MIN/1.73 M^2
GLUCOSE SERPL-MCNC: 226 MG/DL (ref 70–110)
HCT VFR BLD AUTO: 23.4 % (ref 37–48.5)
HGB BLD-MCNC: 7.2 G/DL (ref 12–16)
IMM GRANULOCYTES # BLD AUTO: 0.03 K/UL (ref 0–0.04)
IMM GRANULOCYTES NFR BLD AUTO: 0.3 % (ref 0–0.5)
INR PPP: 1 (ref 0.8–1.2)
LYMPHOCYTES # BLD AUTO: 1.1 K/UL (ref 1–4.8)
LYMPHOCYTES NFR BLD: 10.1 % (ref 18–48)
MAGNESIUM SERPL-MCNC: 1.3 MG/DL (ref 1.6–2.6)
MCH RBC QN AUTO: 31.3 PG (ref 27–31)
MCHC RBC AUTO-ENTMCNC: 30.8 G/DL (ref 32–36)
MCV RBC AUTO: 102 FL (ref 82–98)
MONOCYTES # BLD AUTO: 1.2 K/UL (ref 0.3–1)
MONOCYTES NFR BLD: 11.7 % (ref 4–15)
NEUTROPHILS # BLD AUTO: 7.8 K/UL (ref 1.8–7.7)
NEUTROPHILS NFR BLD: 73.4 % (ref 38–73)
NRBC BLD-RTO: 0 /100 WBC
PHOSPHATE SERPL-MCNC: 2.4 MG/DL (ref 2.7–4.5)
PLATELET # BLD AUTO: 286 K/UL (ref 150–350)
PMV BLD AUTO: 11.1 FL (ref 9.2–12.9)
POCT GLUCOSE: 198 MG/DL (ref 70–110)
POCT GLUCOSE: 205 MG/DL (ref 70–110)
POCT GLUCOSE: 220 MG/DL (ref 70–110)
POCT GLUCOSE: 231 MG/DL (ref 70–110)
POTASSIUM SERPL-SCNC: 2.9 MMOL/L (ref 3.5–5.1)
PROTHROMBIN TIME: 10.9 SEC (ref 9–12.5)
RBC # BLD AUTO: 2.3 M/UL (ref 4–5.4)
SODIUM SERPL-SCNC: 138 MMOL/L (ref 136–145)
WBC # BLD AUTO: 10.63 K/UL (ref 3.9–12.7)

## 2020-08-20 PROCEDURE — 21400001 HC TELEMETRY ROOM

## 2020-08-20 PROCEDURE — 83735 ASSAY OF MAGNESIUM: CPT

## 2020-08-20 PROCEDURE — 25000003 PHARM REV CODE 250: Performed by: SURGERY

## 2020-08-20 PROCEDURE — 97110 THERAPEUTIC EXERCISES: CPT

## 2020-08-20 PROCEDURE — 84100 ASSAY OF PHOSPHORUS: CPT

## 2020-08-20 PROCEDURE — 99024 PR POST-OP FOLLOW-UP VISIT: ICD-10-PCS | Mod: ,,, | Performed by: SURGERY

## 2020-08-20 PROCEDURE — 85610 PROTHROMBIN TIME: CPT

## 2020-08-20 PROCEDURE — 85025 COMPLETE CBC W/AUTO DIFF WBC: CPT

## 2020-08-20 PROCEDURE — 63600175 PHARM REV CODE 636 W HCPCS: Performed by: SURGERY

## 2020-08-20 PROCEDURE — 97161 PT EVAL LOW COMPLEX 20 MIN: CPT

## 2020-08-20 PROCEDURE — 63600175 PHARM REV CODE 636 W HCPCS: Performed by: INTERNAL MEDICINE

## 2020-08-20 PROCEDURE — 36415 COLL VENOUS BLD VENIPUNCTURE: CPT

## 2020-08-20 PROCEDURE — 80048 BASIC METABOLIC PNL TOTAL CA: CPT

## 2020-08-20 PROCEDURE — 97530 THERAPEUTIC ACTIVITIES: CPT

## 2020-08-20 PROCEDURE — 85730 THROMBOPLASTIN TIME PARTIAL: CPT

## 2020-08-20 PROCEDURE — 99024 POSTOP FOLLOW-UP VISIT: CPT | Mod: ,,, | Performed by: SURGERY

## 2020-08-20 RX ORDER — CLOPIDOGREL BISULFATE 75 MG/1
75 TABLET ORAL DAILY
Status: DISCONTINUED | OUTPATIENT
Start: 2020-08-20 | End: 2020-08-24 | Stop reason: HOSPADM

## 2020-08-20 RX ORDER — HEPARIN SODIUM 5000 [USP'U]/ML
5000 INJECTION, SOLUTION INTRAVENOUS; SUBCUTANEOUS EVERY 8 HOURS
Status: DISCONTINUED | OUTPATIENT
Start: 2020-08-20 | End: 2020-08-24 | Stop reason: HOSPADM

## 2020-08-20 RX ORDER — NAPROXEN SODIUM 220 MG/1
81 TABLET, FILM COATED ORAL DAILY
Status: DISCONTINUED | OUTPATIENT
Start: 2020-08-20 | End: 2020-08-24 | Stop reason: HOSPADM

## 2020-08-20 RX ADMIN — CEFEPIME HYDROCHLORIDE 2 G: 2 INJECTION, SOLUTION INTRAVENOUS at 11:08

## 2020-08-20 RX ADMIN — HYDRALAZINE HYDROCHLORIDE 25 MG: 25 TABLET, FILM COATED ORAL at 03:08

## 2020-08-20 RX ADMIN — MORPHINE SULFATE 2 MG: 10 INJECTION INTRAVENOUS at 12:08

## 2020-08-20 RX ADMIN — MORPHINE SULFATE 2 MG: 10 INJECTION INTRAVENOUS at 05:08

## 2020-08-20 RX ADMIN — CLOPIDOGREL 75 MG: 75 TABLET, FILM COATED ORAL at 06:08

## 2020-08-20 RX ADMIN — MUPIROCIN: 20 OINTMENT TOPICAL at 09:08

## 2020-08-20 RX ADMIN — INSULIN ASPART 1 UNITS: 100 INJECTION, SOLUTION INTRAVENOUS; SUBCUTANEOUS at 09:08

## 2020-08-20 RX ADMIN — VANCOMYCIN HYDROCHLORIDE 1250 MG: 1.25 INJECTION, POWDER, LYOPHILIZED, FOR SOLUTION INTRAVENOUS at 01:08

## 2020-08-20 RX ADMIN — AMIODARONE HYDROCHLORIDE 400 MG: 200 TABLET ORAL at 09:08

## 2020-08-20 RX ADMIN — HEPARIN SODIUM 5000 UNITS: 5000 INJECTION INTRAVENOUS; SUBCUTANEOUS at 06:08

## 2020-08-20 RX ADMIN — HEPARIN SODIUM 5000 UNITS: 5000 INJECTION INTRAVENOUS; SUBCUTANEOUS at 09:08

## 2020-08-20 RX ADMIN — MORPHINE SULFATE 2 MG: 10 INJECTION INTRAVENOUS at 09:08

## 2020-08-20 RX ADMIN — OXYCODONE HYDROCHLORIDE AND ACETAMINOPHEN 1 TABLET: 5; 325 TABLET ORAL at 06:08

## 2020-08-20 RX ADMIN — ASPIRIN 81 MG 81 MG: 81 TABLET ORAL at 06:08

## 2020-08-20 RX ADMIN — INSULIN ASPART 2 UNITS: 100 INJECTION, SOLUTION INTRAVENOUS; SUBCUTANEOUS at 09:08

## 2020-08-20 RX ADMIN — HYDRALAZINE HYDROCHLORIDE 25 MG: 25 TABLET, FILM COATED ORAL at 09:08

## 2020-08-20 RX ADMIN — CARVEDILOL 6.25 MG: 6.25 TABLET, FILM COATED ORAL at 09:08

## 2020-08-20 RX ADMIN — HYDRALAZINE HYDROCHLORIDE 25 MG: 25 TABLET, FILM COATED ORAL at 05:08

## 2020-08-20 NOTE — PT/OT/SLP EVAL
Physical Therapy Evaluation    Patient Name:  Suyapa Connelly   MRN:  [id number]    Recommendations:     Discharge Recommendations:  rehabilitation facility   Discharge Equipment Recommendations: walker, rolling, bedside commode, bath bench   Barriers to discharge: Pt was independent with ambulation and ADL's with 5 NASIR her home PTA.  Following extended hospital stay from 12/19-02/20, PT OT recommended Inpatient rehab.  Pt D/C'd home without [facility] or C services.  Pt currently Requires Mod A for t/f's and is unable to ambulate at present time.  Pt is at high risk for falls, morbidity, or re-admission is if she returns home at present time.    Assessment:     Suyapa Connelly is a 54 y.o. female admitted with a medical diagnosis of <principal problem not specified>.  She presents with the following impairments/functional limitations:  weakness, gait instability, decreased upper extremity function, impaired endurance, impaired balance, decreased lower extremity function, impaired coordination, decreased ROM, decreased safety awareness, impaired fine motor, impaired self care skills, pain, impaired skin, impaired functional mobilty, decreased coordination Pt could benefit from an aggressive multidisciplinary approach that can only be found at an [facility] facility in order to help restore pt to highest functional level.   .    Rehab Prognosis: Good; patient would benefit from acute skilled PT services to address these deficits and reach maximum level of function.    Recent Surgery: Procedure(s) (LRB):  CREATION, BYPASS, ARTERIAL, FEMORAL TO POPLITEAL, USING GRAFT, LEFT LOWER EXTREMITY (Left) 2 Days Post-Op    Plan:     During this hospitalization, patient to be seen 6 x/week to address the identified rehab impairments via gait training, therapeutic activities, therapeutic exercises, wheelchair management/training and progress toward the following goals:    · Plan of Care Expires:  09/03/20    Subjective     Chief Complaint: pain  L LE  Patient/Family Comments/goals: to be able to walk again and to have decreased pain.  Pt states that she was ambulating on her toes 2/2 heel wound PTA  Pain/Comfort:  · Pain Rating 1: (0/10 at rest, 10/10 with mobility)  · Pain Addressed 1: Pre-medicate for activity, Reposition, Nurse notified, Cessation of Activity, Distraction    Patients cultural, spiritual, Quaker conflicts given the current situation: no    Living Environment:  Pt lives with her  and daughter in a H with 5 NASIR, HR on L  Prior to admission, patients level of function was Independent with ambulation and ADL's.  Equipment used at home: none.  DME owned (not currently used): none.  Upon discharge, patient will have assistance from  and daughter.      Objective:     Communicated with nsg prior to session.  Patient found HOB elevated with bed alarm, pressure relief boots  upon PT entry to room.    General Precautions: Standard, fall   Orthopedic Precautions:(offloading shoe L Lfoot)   Braces: (offloading shoe L foot)     Exams:  · Cognitive Exam:  Patient is oriented to Person, Place, Time and Situation  · Gross Motor Coordination:  impaired 2/2 pain, weakness, and deconditioning  · Postural Exam:  Patient presented with the following abnormalities:    · -       Rounded shoulders  · -       Forward head  · Sensation:    · -       Intact  light/touch B UE's/LE's  · Skin Integrity/Edema:      · -       Skin integrity: dressings intact to L heel, medial thigh/Leg and hip  · RUE ROM: WFL  · RUE Strength: WFL  · LUE ROM: WFL  · LUE Strength: WFL  · RLE ROM: WFL  · RLE Strength: WFL  · LLE ROM: Hip/knee/ankle limited 2/2 pain  · LLE Strength: Hip flex, Knee ext and Dflx 2+/5    Functional Mobility:  · Bed Mobility:     · Scooting: minimum assistance and for L LE to EOB in sitting and to HOB in supine  · Supine to Sit: minimum assistance and for L LE  · Sit to Supine: minimum assistance and for L LE  · Transfers:     · Sit to  Stand:  minimum assistance and with VC/TC for hand placement/safety and TTWB L LE with no AD  · Toilet Transfer: moderate assistance and with VC/TC for hand placement/safety and TTWB L LE with  no AD  using  Squat Pivot  · Gait: Unable  · Balance: Fair+ sit, Fair/Fair- stand    Therapeutic Activities and Exercises:  Pt performed Toileting with Set-up assist from BSC.  Handout provided and reviewed for B AP's, TKE's, and GS to be performed while in bed throughout the day.  Pt verbalized/demonstrated understanding.     AM-PAC 6 CLICK MOBILITY  Total Score:12     Patient left HOB elevated with call button in reach, bed alarm on and nsg notified.    GOALS:   Multidisciplinary Problems     Physical Therapy Goals        Problem: Physical Therapy Goal    Goal Priority Disciplines Outcome Goal Variances Interventions   Physical Therapy Goal     PT, PT/OT Ongoing, Progressing     Description: Goals to be met by: 2020     Patient will increase functional independence with mobility by performin. Supine to sit with Modified [place]  2. Bed to chair transfer with Modified [place]   3. Gait  x 25' feet with Contact Guard Assistance using Rolling Walker.   4. Wheelchair propulsion x150 feet with Modified [place] using bilateral uppper extremities  5. Ascend/descend 5 stair with left Handrails Minimal Assistance   6. Lower extremity exercise program x10 reps per handout, with supervision                     History:     Past Medical History:   Diagnosis Date    Anxiety     Depression     Diabetes mellitus     type 2    Diabetes mellitus, type 2     GERD (gastroesophageal reflux disease)     Hyperlipemia     Hypertension     Myocardial infarction     minor-caused by stress per pt.       Past Surgical History:   Procedure Laterality Date    Angiogram - Right Extremity Right 7/9/15    angiogram-left leg  10/6/15    CATHETERIZATION OF BOTH LEFT AND RIGHT HEART N/A 2019    Procedure:  CATHETERIZATION, HEART, BOTH LEFT AND RIGHT;  Surgeon: Que Fernando III, MD;  Location: [facility] CATH LAB;  Service: Cardiology;  Laterality: N/A;    CORONARY ANGIOGRAPHY N/A 12/18/2019    Procedure: ANGIOGRAM, CORONARY ARTERY;  Surgeon: Que Fernando III, MD;  Location: [facility] CATH LAB;  Service: Cardiology;  Laterality: N/A;    CORONARY ANGIOGRAPHY INCLUDING BYPASS GRAFTS WITH CATHETERIZATION OF LEFT HEART N/A 7/28/2020    Procedure: ANGIOGRAM, CORONARY, INCLUDING BYPASS GRAFT, WITH LEFT HEART CATHETERIZATION, 9 am;  Surgeon: Rachel Easley MD;  Location: [facility] CATH LAB;  Service: Cardiology;  Laterality: N/A;    CORONARY ARTERY BYPASS GRAFT (CABG) N/A 1/14/2020    Procedure: CORONARY ARTERY BYPASS GRAFT (CABG) x 1     Off Pump;  Surgeon: Huang Altamirano MD;  Location: [address];  Service: Cardiovascular;  Laterality: N/A;    CREATION OF FEMOROPOPLITEAL ARTERIAL BYPASS USING GRAFT Left 8/18/2020    Procedure: CREATION, BYPASS, ARTERIAL, FEMORAL TO POPLITEAL, USING GRAFT, LEFT LOWER EXTREMITY;  Surgeon: Teddy Huber MD;  Location: [facility];  Service: Vascular;  Laterality: Left;  REQUEST 7:15 A.M. START----COVID NEGATIVE ON 8/17  1ST CASE STARTE PER LEANA ON 8/7/2020 @ 942AM-  RN PREOP 8/12/2020   T/S-----CLEARED BY CARDS-------PENDING INSURANCE    INSERTION OF TUNNELED CENTRAL VENOUS HEMODIALYSIS CATHETER N/A 1/27/2020    Procedure: Insertion, Catheter, Central Venous, Hemodialysis;  Surgeon: ESTEBAN Gomez III, MD;  Location: [facility] CATH LAB;  Service: Peripheral Vascular;  Laterality: N/A;       Time Tracking:     PT Received On: 08/20/20  PT Start Time: 1013     PT Stop Time: 1051  PT Total Time (min): 38 min     Billable Minutes: Evaluation 15, Therapeutic Activity 15 and Therapeutic Exercise 8      Veena Montesinos, PT  08/20/2020

## 2020-08-20 NOTE — PROGRESS NOTES
Dressing in place to left posterior heel wound. Instructed patient on placement of heel in EHOB boot to offload pressure. Patient has Pete david at bedside for PT.   Nursing to continue local wound care daily and assist patient with offloading pressure with EHOB boot.

## 2020-08-20 NOTE — PLAN OF CARE
Problem: Physical Therapy Goal  Goal: Physical Therapy Goal  Description: Goals to be met by: 2020     Patient will increase functional independence with mobility by performin. Supine to sit with Modified [place]  2. Bed to chair transfer with Modified [place]   3. Gait  x 25' feet with Contact Guard Assistance using Rolling Walker.   4. Wheelchair propulsion x150 feet with Modified [place] using bilateral uppper extremities  5. Ascend/descend 5 stair with left Handrails Minimal Assistance   6. Lower extremity exercise program x10 reps per handout, with supervision    Outcome: Ongoing, Progressing   Initial PT evaluation performed.  Pt could benefit from skilled PT servcies 6x/wk in order to maximize function prior to D/C.  IPR, RW, BSC, TTB recommended.

## 2020-08-20 NOTE — PROGRESS NOTES
Pharmacokinetic Assessment Follow Up: IV Vancomycin    Vancomycin Regimen Plan:    Continue regimen to Vancomycin 1250 mg IV every 24 hours with next serum trough concentration measured at 1200 prior to 3rd dose on 8/21/2020    Drug levels (last 3 results):  No results for input(s): VANCOMYCINRA, VANCOMYCINPE, VANCOMYCINTR, VANCOTROUGH in the last 72 hours.    Pharmacy will continue to follow and monitor vancomycin.    Please contact pharmacy at extension [phone] for questions regarding this assessment.    Thank you for the consult,   Abdirashid Canela Jr       Patient brief summary:  Suyapa Connelly is a 54 y.o. female initiated on antimicrobial therapy with IV Vancomycin for treatment of bone/joint infection    Drug Allergies:   Review of patient's allergies indicates:   Allergen Reactions    Ciprofloxacin      Itchiness    Contrast media      Kidney injury       Actual Body Weight:   92.5 kg    Renal Function:   Estimated Creatinine Clearance: 45.7 mL/min (A) (based on SCr of 1.6 mg/dL (H)).,     Dialysis Method (if applicable):  N/A    CBC (last 72 hours):  Recent Labs   Lab Result Units 08/19/20  0330 08/20/20  0518   WBC K/uL 7.94 10.63   Hemoglobin g/dL 7.9* 7.2*   Hematocrit % 25.0* 23.4*   Platelets K/uL 333 286   Gran% % 82.7* 73.4*   Lymph% % 4.9* 10.1*   Mono% % 9.6 11.7   Eosinophil% % 2.3 4.3   Basophil% % 0.0 0.2   Differential Method  Automated Automated       Metabolic Panel (last 72 hours):  Recent Labs   Lab Result Units 08/19/20  0330 08/20/20  0518   Sodium mmol/L 142 138   Potassium mmol/L 3.4* 2.9*   Chloride mmol/L 109 104   CO2 mmol/L 21* 25   Glucose mg/dL 257* 226*   BUN, Bld mg/dL 24* 22*   Creatinine mg/dL 1.6* 1.6*   Magnesium mg/dL 1.3* 1.3*   Phosphorus mg/dL 2.9 2.4*       Vancomycin Administrations:  vancomycin given in the last 96 hours                     vancomycin (VANCOCIN) 1,750 mg in dextrose 5 % 500 mL IVPB (mg) 1,750 mg New Bag 08/19/20 1224                    Microbiologic  Results:  Microbiology Results (last 7 days)       ** No results found for the last 168 hours. **

## 2020-08-20 NOTE — NURSING
Report Recieved from off going nurse Trupti RN. Patient is AAO on Room Air, no distress noted, NS infusing @ 50ml an hour, Dressing to Left leg, Intact with dried drainage, small amount. Bed in lowest position, wheels locked, call light in reach.

## 2020-08-21 PROBLEM — Z95.828 S/P FEMORAL-POPLITEAL BYPASS SURGERY: Status: ACTIVE | Noted: 2020-08-21

## 2020-08-21 LAB
ANION GAP SERPL CALC-SCNC: 10 MMOL/L (ref 8–16)
APTT BLDCRRT: 31.8 SEC (ref 21–32)
BASOPHILS # BLD AUTO: 0.02 K/UL (ref 0–0.2)
BASOPHILS NFR BLD: 0.2 % (ref 0–1.9)
BUN SERPL-MCNC: 25 MG/DL (ref 6–20)
CALCIUM SERPL-MCNC: 8.1 MG/DL (ref 8.7–10.5)
CHLORIDE SERPL-SCNC: 104 MMOL/L (ref 95–110)
CO2 SERPL-SCNC: 22 MMOL/L (ref 23–29)
CREAT SERPL-MCNC: 1.5 MG/DL (ref 0.5–1.4)
DIFFERENTIAL METHOD: ABNORMAL
EOSINOPHIL # BLD AUTO: 0.6 K/UL (ref 0–0.5)
EOSINOPHIL NFR BLD: 5.4 % (ref 0–8)
ERYTHROCYTE [DISTWIDTH] IN BLOOD BY AUTOMATED COUNT: 14 % (ref 11.5–14.5)
EST. GFR  (AFRICAN AMERICAN): 46 ML/MIN/1.73 M^2
EST. GFR  (NON AFRICAN AMERICAN): 39 ML/MIN/1.73 M^2
GLUCOSE SERPL-MCNC: 190 MG/DL (ref 70–110)
HCT VFR BLD AUTO: 23.9 % (ref 37–48.5)
HGB BLD-MCNC: 7.4 G/DL (ref 12–16)
IMM GRANULOCYTES # BLD AUTO: 0.05 K/UL (ref 0–0.04)
IMM GRANULOCYTES NFR BLD AUTO: 0.5 % (ref 0–0.5)
INR PPP: 0.9 (ref 0.8–1.2)
LYMPHOCYTES # BLD AUTO: 1.4 K/UL (ref 1–4.8)
LYMPHOCYTES NFR BLD: 14.2 % (ref 18–48)
MAGNESIUM SERPL-MCNC: 1.5 MG/DL (ref 1.6–2.6)
MCH RBC QN AUTO: 31.4 PG (ref 27–31)
MCHC RBC AUTO-ENTMCNC: 31 G/DL (ref 32–36)
MCV RBC AUTO: 101 FL (ref 82–98)
MONOCYTES # BLD AUTO: 1 K/UL (ref 0.3–1)
MONOCYTES NFR BLD: 10.2 % (ref 4–15)
NEUTROPHILS # BLD AUTO: 7.1 K/UL (ref 1.8–7.7)
NEUTROPHILS NFR BLD: 69.5 % (ref 38–73)
NRBC BLD-RTO: 0 /100 WBC
PHOSPHATE SERPL-MCNC: 2.6 MG/DL (ref 2.7–4.5)
PLATELET # BLD AUTO: 299 K/UL (ref 150–350)
PMV BLD AUTO: 10.8 FL (ref 9.2–12.9)
POCT GLUCOSE: 169 MG/DL (ref 70–110)
POCT GLUCOSE: 185 MG/DL (ref 70–110)
POCT GLUCOSE: 198 MG/DL (ref 70–110)
POCT GLUCOSE: 230 MG/DL (ref 70–110)
POTASSIUM SERPL-SCNC: 3.2 MMOL/L (ref 3.5–5.1)
PROTHROMBIN TIME: 10.4 SEC (ref 9–12.5)
RBC # BLD AUTO: 2.36 M/UL (ref 4–5.4)
SODIUM SERPL-SCNC: 136 MMOL/L (ref 136–145)
VANCOMYCIN TROUGH SERPL-MCNC: 13.9 UG/ML (ref 10–22)
WBC # BLD AUTO: 10.16 K/UL (ref 3.9–12.7)

## 2020-08-21 PROCEDURE — 21400001 HC TELEMETRY ROOM

## 2020-08-21 PROCEDURE — 63600175 PHARM REV CODE 636 W HCPCS: Performed by: SURGERY

## 2020-08-21 PROCEDURE — 80048 BASIC METABOLIC PNL TOTAL CA: CPT

## 2020-08-21 PROCEDURE — 99024 POSTOP FOLLOW-UP VISIT: CPT | Mod: ,,, | Performed by: SURGERY

## 2020-08-21 PROCEDURE — 25000003 PHARM REV CODE 250: Performed by: SURGERY

## 2020-08-21 PROCEDURE — 36410 VNPNXR 3YR/> PHY/QHP DX/THER: CPT

## 2020-08-21 PROCEDURE — 83735 ASSAY OF MAGNESIUM: CPT

## 2020-08-21 PROCEDURE — 97116 GAIT TRAINING THERAPY: CPT

## 2020-08-21 PROCEDURE — 25000003 PHARM REV CODE 250: Performed by: INTERNAL MEDICINE

## 2020-08-21 PROCEDURE — 85025 COMPLETE CBC W/AUTO DIFF WBC: CPT

## 2020-08-21 PROCEDURE — 80202 ASSAY OF VANCOMYCIN: CPT

## 2020-08-21 PROCEDURE — 63600175 PHARM REV CODE 636 W HCPCS: Performed by: INTERNAL MEDICINE

## 2020-08-21 PROCEDURE — 97110 THERAPEUTIC EXERCISES: CPT

## 2020-08-21 PROCEDURE — 84100 ASSAY OF PHOSPHORUS: CPT

## 2020-08-21 PROCEDURE — 99024 PR POST-OP FOLLOW-UP VISIT: ICD-10-PCS | Mod: ,,, | Performed by: SURGERY

## 2020-08-21 PROCEDURE — 36415 COLL VENOUS BLD VENIPUNCTURE: CPT

## 2020-08-21 PROCEDURE — 85730 THROMBOPLASTIN TIME PARTIAL: CPT

## 2020-08-21 PROCEDURE — 85610 PROTHROMBIN TIME: CPT

## 2020-08-21 RX ADMIN — HEPARIN SODIUM 5000 UNITS: 5000 INJECTION INTRAVENOUS; SUBCUTANEOUS at 05:08

## 2020-08-21 RX ADMIN — OXYCODONE HYDROCHLORIDE AND ACETAMINOPHEN 1 TABLET: 5; 325 TABLET ORAL at 08:08

## 2020-08-21 RX ADMIN — AMIODARONE HYDROCHLORIDE 400 MG: 200 TABLET ORAL at 08:08

## 2020-08-21 RX ADMIN — CLOPIDOGREL 75 MG: 75 TABLET, FILM COATED ORAL at 08:08

## 2020-08-21 RX ADMIN — MUPIROCIN: 20 OINTMENT TOPICAL at 08:08

## 2020-08-21 RX ADMIN — HYDRALAZINE HYDROCHLORIDE 25 MG: 25 TABLET, FILM COATED ORAL at 09:08

## 2020-08-21 RX ADMIN — OXYCODONE HYDROCHLORIDE AND ACETAMINOPHEN 1 TABLET: 5; 325 TABLET ORAL at 06:08

## 2020-08-21 RX ADMIN — MUPIROCIN: 20 OINTMENT TOPICAL at 09:08

## 2020-08-21 RX ADMIN — HYDRALAZINE HYDROCHLORIDE 25 MG: 25 TABLET, FILM COATED ORAL at 05:08

## 2020-08-21 RX ADMIN — HYDRALAZINE HYDROCHLORIDE 25 MG: 25 TABLET, FILM COATED ORAL at 03:08

## 2020-08-21 RX ADMIN — HEPARIN SODIUM 5000 UNITS: 5000 INJECTION INTRAVENOUS; SUBCUTANEOUS at 03:08

## 2020-08-21 RX ADMIN — OXYCODONE HYDROCHLORIDE AND ACETAMINOPHEN 1 TABLET: 5; 325 TABLET ORAL at 02:08

## 2020-08-21 RX ADMIN — CEFEPIME HYDROCHLORIDE 2 G: 2 INJECTION, SOLUTION INTRAVENOUS at 02:08

## 2020-08-21 RX ADMIN — INSULIN ASPART 2 UNITS: 100 INJECTION, SOLUTION INTRAVENOUS; SUBCUTANEOUS at 12:08

## 2020-08-21 RX ADMIN — VANCOMYCIN HYDROCHLORIDE 1250 MG: 1.25 INJECTION, POWDER, LYOPHILIZED, FOR SOLUTION INTRAVENOUS at 04:08

## 2020-08-21 RX ADMIN — CARVEDILOL 6.25 MG: 6.25 TABLET, FILM COATED ORAL at 08:08

## 2020-08-21 RX ADMIN — HEPARIN SODIUM 5000 UNITS: 5000 INJECTION INTRAVENOUS; SUBCUTANEOUS at 09:08

## 2020-08-21 RX ADMIN — CEFEPIME HYDROCHLORIDE 2 G: 2 INJECTION, SOLUTION INTRAVENOUS at 12:08

## 2020-08-21 RX ADMIN — ASPIRIN 81 MG 81 MG: 81 TABLET ORAL at 08:08

## 2020-08-21 NOTE — PT/OT/SLP PROGRESS
Physical Therapy Treatment    Patient Name:  Suyapa Connelly   MRN:  [id number]    Recommendations:     Discharge Recommendations:  rehabilitation facility   Discharge Equipment Recommendations: walker, rolling, bedside commode, bath bench   Barriers to discharge: Pt is only able to ambulate approx 6 small steps with RW and Min A at present time and requires Min A for t/f's.  Pt has 5 steps to ascend/descend at home.  Pt was ambulating independently PTA and is not currently back at prior level opf function.     Assessment:     Suyapa Connelly is a 54 y.o. female admitted with a medical diagnosis of S/P femoral-popliteal bypass surgery.  She presents with the following impairments/functional limitations:  weakness, impaired endurance, impaired self care skills, impaired functional mobilty, gait instability, impaired balance, pain, decreased safety awareness, impaired skin, decreased lower extremity function, impaired fine motor, decreased coordination, impaired coordination, decreased ROM Pt continues to make steady progress and should continue to do so.  Pt is a good candidate for IPR as she is motivated to return to PLOF.    Rehab Prognosis: Good; patient would benefit from acute skilled PT services to address these deficits and reach maximum level of function.    Recent Surgery: Procedure(s) (LRB):  CREATION, BYPASS, ARTERIAL, FEMORAL TO POPLITEAL, USING GRAFT, LEFT LOWER EXTREMITY (Left) 3 Days Post-Op    Plan:     During this hospitalization, patient to be seen 6 x/week to address the identified rehab impairments via gait training, therapeutic activities, therapeutic exercises, wheelchair management/training and progress toward the following goals:    · Plan of Care Expires:  09/03/20    Subjective     Chief Complaint: pain  Patient/Family Comments/goals: Return to PLOF, increased functional independence  Pain/Comfort:  · Location 1: (L LE)  · Pain Addressed 1: Reposition, Pre-medicate for activity, Distraction,  Cessation of Activity, Nurse notified  · Pain Rating Post-Intervention 1: 5/10      Objective:     Communicated with nsg prior to session.  Patient found HOB elevated with bed alarm, pressure relief boots upon PT entry to room.     General Precautions: Standard, fall   Orthopedic Precautions:(offloading L heel, Offloading shoe L foot)   Braces: (Pt has Darco shoe that is much too big for her foot.)     Functional Mobility:  · Bed Mobility:     · Scooting: contact guard assistance  · Supine to Sit: contact guard assistance  · Transfers:     · Sit to Stand:  minimum assistance and with Vc/Tc for hand placement/safety, forward weight shift over MARIO and TTWB to offload L heel  with rolling walker  · Gait: Gait training approx 6 small steps with RW and CGA/Min A for walker managemetn with VC for walker managemetn/safety, distribution of weight through UE's to RW, TTWB L LE, and sequencing with RW.  Pt ambulates with decreased nirav and step length indicating an increase in fall risk   · Balance: Fair+ sit, Fair stand      AM-PAC 6 CLICK MOBILITY  Turning over in bed (including adjusting bedclothes, sheets and blankets)?: 3  Sitting down on and standing up from a chair with arms (e.g., wheelchair, bedside commode, etc.): 3  Moving from lying on back to sitting on the side of the bed?: 3  Moving to and from a bed to a chair (including a wheelchair)?: 3  Need to walk in hospital room?: 3  Climbing 3-5 steps with a railing?: 2  Basic Mobility Total Score: 17       Therapeutic Activities and Exercises:   Pt performed B AP's, GS, and QS per handout x 10 reps in reclined position with VC/TC to perform correctly.   Pt sat at EOB with SBA and performed L HS's x 10 reps with VC to perform correctly.  Educated pt to continue to perform ther ex per handout while up in recliner throughout the day.  Pt verbalized/demonstrated undertstanding    Patient left up in chair with call button in reach and nsg notified..    GOALS:    Multidisciplinary Problems     Physical Therapy Goals        Problem: Physical Therapy Goal    Goal Priority Disciplines Outcome Goal Variances Interventions   Physical Therapy Goal     PT, PT/OT Ongoing, Progressing     Description: Goals to be met by: 2020     Patient will increase functional independence with mobility by performin. Supine to sit with Modified [place]  2. Bed to chair transfer with Modified [place]   3. Gait  x 25' feet with Contact Guard Assistance using Rolling Walker.   4. Wheelchair propulsion x150 feet with Modified [place] using bilateral uppper extremities  5. Ascend/descend 5 stair with left Handrails Minimal Assistance   6. Lower extremity exercise program x10 reps per handout, with supervision                     Time Tracking:     PT Received On: 20  PT Start Time: 1144     PT Stop Time: 1207  PT Total Time (min): 23 min     Billable Minutes: Gait Training 8 and Therapeutic Exercise 15    Treatment Type: Treatment  PT/PTA: PT     PTA Visit Number: 0     Veena Montesinos, PT  2020

## 2020-08-21 NOTE — NURSING
Lost patient's IV access. PIV attempted, but patient has poor venous access. New order placed by Dr. Mendosa for midline to be placed.

## 2020-08-21 NOTE — NURSING
Report given to oncoming nurse Elsi RN, Patient is awake and alert. Pain medicine given right before shift change, Wound care to left heel completed Bed is in lowest position, wheels locked, call light is in reach. 12 hour chart check completed

## 2020-08-21 NOTE — ASSESSMENT & PLAN NOTE
-s/p L femoral - BK popliteal prosthetic bypass 8/18/20 - recovering well  -HLIV  -ADA diet  -Cont PT  -Pain control prn  -Continue pertinent home meds   -DVT ppx

## 2020-08-21 NOTE — PROGRESS NOTES
Pharmacokinetic Assessment Follow Up: IV Vancomycin    Vancomycin serum concentration assessment(s):    The trough level was drawn correctly and can be used to guide therapy at this time. The measurement is within the desired definitive target range of 10 to 20 mcg/mL.    Vancomycin Regimen Plan:    Continue regimen to Vancomycin 1250 mg IV every 24 hours with next serum trough concentration measured at 11:30 prior to third dose on 8/24/2020    Drug levels (last 3 results):  Recent Labs   Lab Result Units 08/21/20  1210   Vancomycin-Trough ug/mL 13.9       Pharmacy will continue to follow and monitor vancomycin.    Please contact pharmacy at extension [phone] for questions regarding this assessment.    Thank you for the consult,   Prerna oMra       Patient brief summary:  Suyapa Connelly is a 54 y.o. female initiated on antimicrobial therapy with IV Vancomycin for treatment of bone/joint infection    The patient's current regimen is vancomycin 1250 mg q24h     Drug Allergies:   Review of patient's allergies indicates:   Allergen Reactions    Ciprofloxacin      Itchiness    Contrast media      Kidney injury       Actual Body Weight:   93.5 kg     Renal Function:   Estimated Creatinine Clearance: 49 mL/min (A) (based on SCr of 1.5 mg/dL (H)).,     Dialysis Method (if applicable):  N/A    CBC (last 72 hours):  Recent Labs   Lab Result Units 08/19/20  0330 08/20/20  0518 08/21/20  0545   WBC K/uL 7.94 10.63 10.16   Hemoglobin g/dL 7.9* 7.2* 7.4*   Hematocrit % 25.0* 23.4* 23.9*   Platelets K/uL 333 286 299   Gran% % 82.7* 73.4* 69.5   Lymph% % 4.9* 10.1* 14.2*   Mono% % 9.6 11.7 10.2   Eosinophil% % 2.3 4.3 5.4   Basophil% % 0.0 0.2 0.2   Differential Method  Automated Automated Automated       Metabolic Panel (last 72 hours):  Recent Labs   Lab Result Units 08/19/20  0330 08/20/20  0518 08/21/20  0545   Sodium mmol/L 142 138 136   Potassium mmol/L 3.4* 2.9* 3.2*   Chloride mmol/L 109 104 104   CO2 mmol/L 21* 25 22*    Glucose mg/dL 257* 226* 190*   BUN, Bld mg/dL 24* 22* 25*   Creatinine mg/dL 1.6* 1.6* 1.5*   Magnesium mg/dL 1.3* 1.3* 1.5*   Phosphorus mg/dL 2.9 2.4* 2.6*       Vancomycin Administrations:  vancomycin given in the last 96 hours                   vancomycin 1.25 g in dextrose 5% 250 mL IVPB (ready to mix) (mg) 1,250 mg New Bag 08/21/20 1600     1,250 mg New Bag 08/20/20 1330    vancomycin (VANCOCIN) 1,750 mg in dextrose 5 % 500 mL IVPB (mg) 1,750 mg New Bag 08/19/20 1224                Microbiologic Results:  Microbiology Results (last 7 days)     ** No results found for the last 168 hours. **

## 2020-08-21 NOTE — PROGRESS NOTES
[facility]  Vascular Surgery  Progress Note    Patient Name: Suyapa Connelly  MRN: [id number]  Admission Date: 8/18/2020  Primary Care Provider: Erlinda Rahman NP    Subjective:     Interval History: +nausea and vomiting.  Remains in ICU    Post-Op Info:  Procedure(s) (LRB):  CREATION, BYPASS, ARTERIAL, FEMORAL TO POPLITEAL, USING GRAFT, LEFT LOWER EXTREMITY (Left)   2 Days Post-Op       Medications:  Continuous Infusions:  Scheduled Meds:   amiodarone  400 mg Oral Daily    aspirin  81 mg Oral Daily    carvediloL  6.25 mg Oral Daily    ceFEPime (MAXIPIME) IVPB  2 g Intravenous Q12H    clopidogreL  75 mg Oral Daily    heparin (porcine)  5,000 Units Subcutaneous Q8H    hydrALAZINE  25 mg Oral Q8H    mupirocin   Nasal BID    vancomycin (VANCOCIN) IVPB  1,250 mg Intravenous Q24H     PRN Meds:acetaminophen, dextrose 50%, dextrose 50%, glucagon (human recombinant), glucose, glucose, hydrALAZINE, HYDROcodone-acetaminophen, HYDROmorphone, insulin aspart U-100, labetalol, morphine, ondansetron, ondansetron, oxyCODONE-acetaminophen, Pharmacy to dose Vancomycin consult **AND** vancomycin - pharmacy to dose     Objective:     Vital Signs (Most Recent):  Temp: 98.2 °F (36.8 °C) (08/20/20 2034)  Pulse: 88 (08/20/20 2034)  Resp: 18 (08/20/20 2034)  BP: 132/64 (08/20/20 2034)  SpO2: 98 % (08/20/20 2034) Vital Signs (24h Range):  Temp:  [97.8 °F (36.6 °C)-99.5 °F (37.5 °C)] 98.2 °F (36.8 °C)  Pulse:  [79-96] 88  Resp:  [16-20] 18  SpO2:  [95 %-98 %] 98 %  BP: (132-151)/(63-70) 132/64     Date 08/20/20 0700 - 08/21/20 0659   Shift 5262-3447 3574-7856 5864-0646 24 Hour Total   INTAKE   P.O. 100   100   Shift Total(mL/kg) 100(1.1)   100(1.1)   OUTPUT   Shift Total(mL/kg)       Weight (kg) 92.5 92.5 92.5 92.5       Physical Exam  Vitals signs reviewed.   Constitutional:       General: She is not in acute distress.     Appearance: She is well-developed. She is not diaphoretic.   HENT:      Head: Normocephalic  and atraumatic.   Eyes:      Conjunctiva/sclera: Conjunctivae normal.   Neck:      Musculoskeletal: Neck supple.   Cardiovascular:      Rate and Rhythm: Normal rate.      Pulses:           Femoral pulses are 2+ on the right side and 2+ on the left side.       Popliteal pulses are 2+ on the right side and 2+ on the left side.        Dorsalis pedis pulses are detected w/ Doppler on the right side and 2+ on the left side.        Posterior tibial pulses are detected w/ Doppler on the right side and detected w/ Doppler on the left side.   Pulmonary:      Effort: Pulmonary effort is normal.   Abdominal:      General: There is no distension.      Palpations: Abdomen is soft. There is no mass.      Tenderness: There is no abdominal tenderness. There is no guarding or rebound.      Hernia: No hernia is present.   Musculoskeletal: Normal range of motion.         General: No deformity.   Feet:      Left foot:      Skin integrity: Ulcer and skin breakdown present. No erythema.   Skin:     General: Skin is warm and dry.      Findings: No rash.   Neurological:      General: No focal deficit present.      Mental Status: She is alert and oriented to person, place, and time.   Psychiatric:         Mood and Affect: Mood normal.         Significant Labs:  All pertinent labs from the last 24 hours have been reviewed.    Significant Diagnostics:  I have reviewed all pertinent imaging results/findings within the past 24 hours.    Assessment/Plan:     Critical lower limb ischemia  -s/p L femoral - BK popliteal prosthetic bypass 8/18/20 - recovering well.   -d/c arterial line  -remove snow  -Cont IVF while pt N/V  -Clear liquid diet - Zofran prn  -Cont PT  -Pain control prn  -Continue pertinent home meds   -DVT ppx    Stage 3 chronic kidney disease  -cont home meds and volume optimization    Acute osteomyelitis of left calcaneus  -cont ID rec Abx    Non healing left heel wound  -cont wound care and offloading    Coronary artery disease  involving native coronary artery of native heart without angina pectoris  -cont ASA and Plavix  -cont home meds    Dilated cardiomyopathy  -cont home meds  -cont ASA and Plavix    Mixed hyperlipidemia  -cont home meds    Chronic combined systolic and diastolic heart failure  -cont home meds    Essential hypertension  -cont home meds    Type 2 diabetes mellitus without complication, with long-term current use of insulin  -cont sliding scale insulin    PAD (peripheral artery disease)  -cont ASA        Teddy Huber MD  Vascular Surgery  [facility]

## 2020-08-21 NOTE — PLAN OF CARE
Problem: Wound  Goal: Optimal Wound Healing  Outcome: Ongoing, Progressing  Intervention: Promote Effective Wound Healing  Flowsheets (Taken 8/21/2020 0622)  Oral Nutrition Promotion:   rest periods promoted   medicated  Pain Management Interventions:   care clustered   pain management plan reviewed with patient/caregiver   quiet environment facilitated   position adjusted     Problem: Diabetes Comorbidity  Goal: Blood Glucose Level Within Desired Range  Outcome: Ongoing, Progressing  Intervention: Maintain Glycemic Control  Flowsheets (Taken 8/21/2020 0622)  Glycemic Management:   blood glucose monitoring   supplemental insulin given     Problem: Electrolyte Imbalance (Acute Kidney Injury/Impairment)  Goal: Serum Electrolyte Balance  Outcome: Ongoing, Progressing     Problem: Fluid Imbalance (Acute Kidney Injury/Impairment)  Goal: Optimal Fluid Balance  Outcome: Ongoing, Progressing  Intervention: Monitor and Manage Fluid Balance  Flowsheets (Taken 8/21/2020 0622)  Fluid/Electrolyte Management: fluids provided     Problem: Hematologic Alteration (Acute Kidney Injury/Impairment)  Goal: Hemoglobin, Hematocrit and Platelets Within Normal Range  Outcome: Ongoing, Progressing     Problem: Oral Intake Inadequate (Acute Kidney Injury/Impairment)  Goal: Optimal Nutrition Intake  Outcome: Ongoing, Progressing     Problem: Renal Function Impairment (Acute Kidney Injury/Impairment)  Goal: Effective Renal Function  Outcome: Ongoing, Progressing     Problem: Adult Inpatient Plan of Care  Goal: Plan of Care Review  Outcome: Ongoing, Progressing  Goal: Patient-Specific Goal (Individualization)  Outcome: Ongoing, Progressing  Goal: Absence of Hospital-Acquired Illness or Injury  Outcome: Ongoing, Progressing  Goal: Optimal Comfort and Wellbeing  Outcome: Ongoing, Progressing  Goal: Readiness for Transition of Care  Outcome: Ongoing, Progressing  Goal: Rounds/Family Conference  Outcome: Ongoing, Progressing     Problem: Fall  Injury Risk  Goal: Absence of Fall and Fall-Related Injury  Outcome: Ongoing, Progressing     Problem: Infection  Goal: Infection Symptom Resolution  Outcome: Ongoing, Progressing     Problem: Skin Injury Risk Increased  Goal: Skin Health and Integrity  Outcome: Ongoing, Progressing     Problem: Diabetes Comorbidity  Goal: Blood Glucose Level Within Desired Range  Outcome: Ongoing, Progressing  Intervention: Maintain Glycemic Control  Flowsheets (Taken 8/21/2020 0622)  Glycemic Management:   blood glucose monitoring   supplemental insulin given     Problem: Electrolyte Imbalance (Acute Kidney Injury/Impairment)  Goal: Serum Electrolyte Balance  Outcome: Ongoing, Progressing     Problem: Fluid Imbalance (Acute Kidney Injury/Impairment)  Goal: Optimal Fluid Balance  Outcome: Ongoing, Progressing  Intervention: Monitor and Manage Fluid Balance  Flowsheets (Taken 8/21/2020 0622)  Fluid/Electrolyte Management: fluids provided     Problem: Adult Inpatient Plan of Care  Goal: Plan of Care Review  Outcome: Ongoing, Progressing

## 2020-08-21 NOTE — PLAN OF CARE
Problem: Physical Therapy Goal  Goal: Physical Therapy Goal  Description: Goals to be met by: 2020     Patient will increase functional independence with mobility by performin. Supine to sit with Modified [place]  2. Bed to chair transfer with Modified [place]   3. Gait  x 25' feet with Contact Guard Assistance using Rolling Walker.   4. Wheelchair propulsion x150 feet with Modified [place] using bilateral uppper extremities  5. Ascend/descend 5 stair with left Handrails Minimal Assistance   6. Lower extremity exercise program x10 reps per handout, with supervision    Outcome: Ongoing, Progressing   Pt is making steady progress toward PT goals and should continue to do so.   IPR recommended, Continue with POC.

## 2020-08-21 NOTE — ASSESSMENT & PLAN NOTE
-s/p L femoral - BK popliteal prosthetic bypass 8/18/20 - recovering well.   -d/c arterial line  -remove snow  -Cont IVF while pt N/V  -Clear liquid diet - Zofran prn  -Cont PT  -Pain control prn  -Continue pertinent home meds   -DVT ppx

## 2020-08-21 NOTE — PROGRESS NOTES
[facility]  Vascular Surgery  Progress Note    Patient Name: Suyapa Connelly  MRN: [id number]  Admission Date: 8/18/2020  Primary Care Provider: Erlinda Rahman NP    Subjective:     Interval History: Worked with PT today    Post-Op Info:  Procedure(s) (LRB):  CREATION, BYPASS, ARTERIAL, FEMORAL TO POPLITEAL, USING GRAFT, LEFT LOWER EXTREMITY (Left)   2 Days Post-Op       Medications:  Continuous Infusions:  Scheduled Meds:   amiodarone  400 mg Oral Daily    aspirin  81 mg Oral Daily    carvediloL  6.25 mg Oral Daily    ceFEPime (MAXIPIME) IVPB  2 g Intravenous Q12H    clopidogreL  75 mg Oral Daily    heparin (porcine)  5,000 Units Subcutaneous Q8H    hydrALAZINE  25 mg Oral Q8H    mupirocin   Nasal BID    vancomycin (VANCOCIN) IVPB  1,250 mg Intravenous Q24H     PRN Meds:acetaminophen, dextrose 50%, dextrose 50%, glucagon (human recombinant), glucose, glucose, hydrALAZINE, HYDROcodone-acetaminophen, HYDROmorphone, insulin aspart U-100, labetalol, morphine, ondansetron, ondansetron, oxyCODONE-acetaminophen, Pharmacy to dose Vancomycin consult **AND** vancomycin - pharmacy to dose     Objective:     Vital Signs (Most Recent):  Temp: 98.2 °F (36.8 °C) (08/20/20 2034)  Pulse: 88 (08/20/20 2034)  Resp: 18 (08/20/20 2034)  BP: 132/64 (08/20/20 2034)  SpO2: 98 % (08/20/20 2034) Vital Signs (24h Range):  Temp:  [97.8 °F (36.6 °C)-99.5 °F (37.5 °C)] 98.2 °F (36.8 °C)  Pulse:  [79-96] 88  Resp:  [16-20] 18  SpO2:  [95 %-98 %] 98 %  BP: (132-151)/(63-70) 132/64     Date 08/20/20 0700 - 08/21/20 0659   Shift 0967-5530 0011-5971 9738-1023 24 Hour Total   INTAKE   P.O. 100   100   Shift Total(mL/kg) 100(1.1)   100(1.1)   OUTPUT   Shift Total(mL/kg)       Weight (kg) 92.5 92.5 92.5 92.5       Physical Exam  Vitals signs reviewed.   Constitutional:       General: She is not in acute distress.     Appearance: She is well-developed. She is not diaphoretic.   HENT:      Head: Normocephalic and atraumatic.    Eyes:      Conjunctiva/sclera: Conjunctivae normal.   Neck:      Musculoskeletal: Neck supple.   Cardiovascular:      Rate and Rhythm: Normal rate.      Pulses:           Femoral pulses are 2+ on the right side and 2+ on the left side.       Popliteal pulses are 2+ on the right side and 2+ on the left side.        Dorsalis pedis pulses are detected w/ Doppler on the right side and 2+ on the left side.        Posterior tibial pulses are detected w/ Doppler on the right side and detected w/ Doppler on the left side.   Pulmonary:      Effort: Pulmonary effort is normal.   Abdominal:      General: There is no distension.      Palpations: Abdomen is soft. There is no mass.      Tenderness: There is no abdominal tenderness. There is no guarding or rebound.      Hernia: No hernia is present.   Musculoskeletal: Normal range of motion.         General: No deformity.   Feet:      Left foot:      Skin integrity: Ulcer and skin breakdown present. No erythema.   Skin:     General: Skin is warm and dry.      Findings: No rash.   Neurological:      General: No focal deficit present.      Mental Status: She is alert and oriented to person, place, and time.   Psychiatric:         Mood and Affect: Mood normal.         Significant Labs:  All pertinent labs from the last 24 hours have been reviewed.    Significant Diagnostics:  I have reviewed all pertinent imaging results/findings within the past 24 hours.    Assessment/Plan:     Critical lower limb ischemia  -s/p L femoral - BK popliteal prosthetic bypass 8/18/20 - recovering well  -HLIV  -ADA diet  -Cont PT  -Pain control prn  -Continue pertinent home meds   -DVT ppx    Stage 3 chronic kidney disease  -cont home meds and volume optimization    Acute osteomyelitis of left calcaneus  -cont ID rec Abx    Non healing left heel wound  -cont wound care and offloading    Coronary artery disease involving native coronary artery of native heart without angina pectoris  -cont ASA and  Plavix  -cont home meds    Dilated cardiomyopathy  -cont home meds  -cont ASA and Plavix    Mixed hyperlipidemia  -cont home meds    Chronic combined systolic and diastolic heart failure  -cont home meds    Essential hypertension  -cont home meds    Type 2 diabetes mellitus without complication, with long-term current use of insulin  -cont sliding scale insulin    PAD (peripheral artery disease)  -cont ASA        Teddy Huber MD  Vascular Surgery  [facility]   quit 45 years ago

## 2020-08-21 NOTE — SUBJECTIVE & OBJECTIVE
Medications:  Continuous Infusions:  Scheduled Meds:   amiodarone  400 mg Oral Daily    aspirin  81 mg Oral Daily    carvediloL  6.25 mg Oral Daily    ceFEPime (MAXIPIME) IVPB  2 g Intravenous Q12H    clopidogreL  75 mg Oral Daily    heparin (porcine)  5,000 Units Subcutaneous Q8H    hydrALAZINE  25 mg Oral Q8H    mupirocin   Nasal BID    vancomycin (VANCOCIN) IVPB  1,250 mg Intravenous Q24H     PRN Meds:acetaminophen, dextrose 50%, dextrose 50%, glucagon (human recombinant), glucose, glucose, hydrALAZINE, HYDROcodone-acetaminophen, HYDROmorphone, insulin aspart U-100, labetalol, morphine, ondansetron, ondansetron, oxyCODONE-acetaminophen, Pharmacy to dose Vancomycin consult **AND** vancomycin - pharmacy to dose     Objective:     Vital Signs (Most Recent):  Temp: 98.2 °F (36.8 °C) (08/20/20 2034)  Pulse: 88 (08/20/20 2034)  Resp: 18 (08/20/20 2034)  BP: 132/64 (08/20/20 2034)  SpO2: 98 % (08/20/20 2034) Vital Signs (24h Range):  Temp:  [97.8 °F (36.6 °C)-99.5 °F (37.5 °C)] 98.2 °F (36.8 °C)  Pulse:  [79-96] 88  Resp:  [16-20] 18  SpO2:  [95 %-98 %] 98 %  BP: (132-151)/(63-70) 132/64     Date 08/20/20 0700 - 08/21/20 0659   Shift 9312-9227 6450-5869 7860-1680 24 Hour Total   INTAKE   P.O. 100   100   Shift Total(mL/kg) 100(1.1)   100(1.1)   OUTPUT   Shift Total(mL/kg)       Weight (kg) 92.5 92.5 92.5 92.5       Physical Exam  Vitals signs reviewed.   Constitutional:       General: She is not in acute distress.     Appearance: She is well-developed. She is not diaphoretic.   HENT:      Head: Normocephalic and atraumatic.   Eyes:      Conjunctiva/sclera: Conjunctivae normal.   Neck:      Musculoskeletal: Neck supple.   Cardiovascular:      Rate and Rhythm: Normal rate.      Pulses:           Femoral pulses are 2+ on the right side and 2+ on the left side.       Popliteal pulses are 2+ on the right side and 2+ on the left side.        Dorsalis pedis pulses are detected w/ Doppler on the right side and 2+ on  the left side.        Posterior tibial pulses are detected w/ Doppler on the right side and detected w/ Doppler on the left side.   Pulmonary:      Effort: Pulmonary effort is normal.   Abdominal:      General: There is no distension.      Palpations: Abdomen is soft. There is no mass.      Tenderness: There is no abdominal tenderness. There is no guarding or rebound.      Hernia: No hernia is present.   Musculoskeletal: Normal range of motion.         General: No deformity.   Feet:      Left foot:      Skin integrity: Ulcer and skin breakdown present. No erythema.   Skin:     General: Skin is warm and dry.      Findings: No rash.   Neurological:      General: No focal deficit present.      Mental Status: She is alert and oriented to person, place, and time.   Psychiatric:         Mood and Affect: Mood normal.         Significant Labs:  All pertinent labs from the last 24 hours have been reviewed.    Significant Diagnostics:  I have reviewed all pertinent imaging results/findings within the past 24 hours.

## 2020-08-21 NOTE — SUBJECTIVE & OBJECTIVE
Medications:  Continuous Infusions:  Scheduled Meds:   amiodarone  400 mg Oral Daily    aspirin  81 mg Oral Daily    carvediloL  6.25 mg Oral Daily    ceFEPime (MAXIPIME) IVPB  2 g Intravenous Q12H    clopidogreL  75 mg Oral Daily    heparin (porcine)  5,000 Units Subcutaneous Q8H    hydrALAZINE  25 mg Oral Q8H    mupirocin   Nasal BID    vancomycin (VANCOCIN) IVPB  1,250 mg Intravenous Q24H     PRN Meds:acetaminophen, dextrose 50%, dextrose 50%, glucagon (human recombinant), glucose, glucose, hydrALAZINE, HYDROcodone-acetaminophen, HYDROmorphone, insulin aspart U-100, labetalol, morphine, ondansetron, ondansetron, oxyCODONE-acetaminophen, Pharmacy to dose Vancomycin consult **AND** vancomycin - pharmacy to dose     Objective:     Vital Signs (Most Recent):  Temp: 98.2 °F (36.8 °C) (08/20/20 2034)  Pulse: 88 (08/20/20 2034)  Resp: 18 (08/20/20 2034)  BP: 132/64 (08/20/20 2034)  SpO2: 98 % (08/20/20 2034) Vital Signs (24h Range):  Temp:  [97.8 °F (36.6 °C)-99.5 °F (37.5 °C)] 98.2 °F (36.8 °C)  Pulse:  [79-96] 88  Resp:  [16-20] 18  SpO2:  [95 %-98 %] 98 %  BP: (132-151)/(63-70) 132/64     Date 08/20/20 0700 - 08/21/20 0659   Shift [id number] 3399-8204 7757-9019 24 Hour Total   INTAKE   P.O. 100   100   Shift Total(mL/kg) 100(1.1)   100(1.1)   OUTPUT   Shift Total(mL/kg)       Weight (kg) 92.5 92.5 92.5 92.5       Physical Exam  Vitals signs reviewed.   Constitutional:       General: She is not in acute distress.     Appearance: She is well-developed. She is not diaphoretic.   HENT:      Head: Normocephalic and atraumatic.   Eyes:      Conjunctiva/sclera: Conjunctivae normal.   Neck:      Musculoskeletal: Neck supple.   Cardiovascular:      Rate and Rhythm: Normal rate.      Pulses:           Femoral pulses are 2+ on the right side and 2+ on the left side.       Popliteal pulses are 2+ on the right side and 2+ on the left side.        Dorsalis pedis pulses are detected w/ Doppler on the right side and 2+ on  the left side.        Posterior tibial pulses are detected w/ Doppler on the right side and detected w/ Doppler on the left side.   Pulmonary:      Effort: Pulmonary effort is normal.   Abdominal:      General: There is no distension.      Palpations: Abdomen is soft. There is no mass.      Tenderness: There is no abdominal tenderness. There is no guarding or rebound.      Hernia: No hernia is present.   Musculoskeletal: Normal range of motion.         General: No deformity.   Feet:      Left foot:      Skin integrity: Ulcer and skin breakdown present. No erythema.   Skin:     General: Skin is warm and dry.      Findings: No rash.   Neurological:      General: No focal deficit present.      Mental Status: She is alert and oriented to person, place, and time.   Psychiatric:         Mood and Affect: Mood normal.         Significant Labs:  All pertinent labs from the last 24 hours have been reviewed.    Significant Diagnostics:  I have reviewed all pertinent imaging results/findings within the past 24 hours.

## 2020-08-22 LAB
ANION GAP SERPL CALC-SCNC: 13 MMOL/L (ref 8–16)
APTT BLDCRRT: 32.4 SEC (ref 21–32)
BASOPHILS # BLD AUTO: 0.03 K/UL (ref 0–0.2)
BASOPHILS NFR BLD: 0.4 % (ref 0–1.9)
BUN SERPL-MCNC: 24 MG/DL (ref 6–20)
CALCIUM SERPL-MCNC: 8.7 MG/DL (ref 8.7–10.5)
CHLORIDE SERPL-SCNC: 104 MMOL/L (ref 95–110)
CO2 SERPL-SCNC: 20 MMOL/L (ref 23–29)
CREAT SERPL-MCNC: 1.4 MG/DL (ref 0.5–1.4)
DIFFERENTIAL METHOD: ABNORMAL
EOSINOPHIL # BLD AUTO: 0.8 K/UL (ref 0–0.5)
EOSINOPHIL NFR BLD: 9.6 % (ref 0–8)
ERYTHROCYTE [DISTWIDTH] IN BLOOD BY AUTOMATED COUNT: 13.8 % (ref 11.5–14.5)
EST. GFR  (AFRICAN AMERICAN): 49 ML/MIN/1.73 M^2
EST. GFR  (NON AFRICAN AMERICAN): 43 ML/MIN/1.73 M^2
GLUCOSE SERPL-MCNC: 164 MG/DL (ref 70–110)
HCT VFR BLD AUTO: 23.1 % (ref 37–48.5)
HGB BLD-MCNC: 7.2 G/DL (ref 12–16)
IMM GRANULOCYTES # BLD AUTO: 0.03 K/UL (ref 0–0.04)
IMM GRANULOCYTES NFR BLD AUTO: 0.4 % (ref 0–0.5)
INR PPP: 0.9 (ref 0.8–1.2)
LYMPHOCYTES # BLD AUTO: 1.3 K/UL (ref 1–4.8)
LYMPHOCYTES NFR BLD: 15.3 % (ref 18–48)
MAGNESIUM SERPL-MCNC: 1.7 MG/DL (ref 1.6–2.6)
MCH RBC QN AUTO: 31 PG (ref 27–31)
MCHC RBC AUTO-ENTMCNC: 31.2 G/DL (ref 32–36)
MCV RBC AUTO: 100 FL (ref 82–98)
MONOCYTES # BLD AUTO: 0.8 K/UL (ref 0.3–1)
MONOCYTES NFR BLD: 10.1 % (ref 4–15)
NEUTROPHILS # BLD AUTO: 5.4 K/UL (ref 1.8–7.7)
NEUTROPHILS NFR BLD: 64.2 % (ref 38–73)
NRBC BLD-RTO: 0 /100 WBC
PHOSPHATE SERPL-MCNC: 2.9 MG/DL (ref 2.7–4.5)
PLATELET # BLD AUTO: 338 K/UL (ref 150–350)
PMV BLD AUTO: 10.7 FL (ref 9.2–12.9)
POCT GLUCOSE: 168 MG/DL (ref 70–110)
POCT GLUCOSE: 184 MG/DL (ref 70–110)
POCT GLUCOSE: 192 MG/DL (ref 70–110)
POCT GLUCOSE: 244 MG/DL (ref 70–110)
POTASSIUM SERPL-SCNC: 3.2 MMOL/L (ref 3.5–5.1)
PROTHROMBIN TIME: 10 SEC (ref 9–12.5)
RBC # BLD AUTO: 2.32 M/UL (ref 4–5.4)
SODIUM SERPL-SCNC: 137 MMOL/L (ref 136–145)
WBC # BLD AUTO: 8.35 K/UL (ref 3.9–12.7)

## 2020-08-22 PROCEDURE — 84100 ASSAY OF PHOSPHORUS: CPT

## 2020-08-22 PROCEDURE — 80048 BASIC METABOLIC PNL TOTAL CA: CPT

## 2020-08-22 PROCEDURE — 63600175 PHARM REV CODE 636 W HCPCS: Performed by: SURGERY

## 2020-08-22 PROCEDURE — 85610 PROTHROMBIN TIME: CPT

## 2020-08-22 PROCEDURE — 97116 GAIT TRAINING THERAPY: CPT | Mod: CQ

## 2020-08-22 PROCEDURE — 21400001 HC TELEMETRY ROOM

## 2020-08-22 PROCEDURE — 97530 THERAPEUTIC ACTIVITIES: CPT | Mod: CQ

## 2020-08-22 PROCEDURE — 94761 N-INVAS EAR/PLS OXIMETRY MLT: CPT

## 2020-08-22 PROCEDURE — 83735 ASSAY OF MAGNESIUM: CPT

## 2020-08-22 PROCEDURE — 25000003 PHARM REV CODE 250: Performed by: SURGERY

## 2020-08-22 PROCEDURE — 36415 COLL VENOUS BLD VENIPUNCTURE: CPT

## 2020-08-22 PROCEDURE — 85730 THROMBOPLASTIN TIME PARTIAL: CPT

## 2020-08-22 PROCEDURE — 99024 PR POST-OP FOLLOW-UP VISIT: ICD-10-PCS | Mod: ,,, | Performed by: SURGERY

## 2020-08-22 PROCEDURE — 85025 COMPLETE CBC W/AUTO DIFF WBC: CPT

## 2020-08-22 PROCEDURE — 97535 SELF CARE MNGMENT TRAINING: CPT

## 2020-08-22 PROCEDURE — 25000003 PHARM REV CODE 250: Performed by: INTERNAL MEDICINE

## 2020-08-22 PROCEDURE — 99024 POSTOP FOLLOW-UP VISIT: CPT | Mod: ,,, | Performed by: SURGERY

## 2020-08-22 PROCEDURE — 63600175 PHARM REV CODE 636 W HCPCS: Performed by: INTERNAL MEDICINE

## 2020-08-22 PROCEDURE — 97165 OT EVAL LOW COMPLEX 30 MIN: CPT

## 2020-08-22 RX ADMIN — HYDROCODONE BITARTRATE AND ACETAMINOPHEN 1 TABLET: 5; 325 TABLET ORAL at 10:08

## 2020-08-22 RX ADMIN — OXYCODONE HYDROCHLORIDE AND ACETAMINOPHEN 1 TABLET: 5; 325 TABLET ORAL at 10:08

## 2020-08-22 RX ADMIN — CEFEPIME HYDROCHLORIDE 2 G: 2 INJECTION, SOLUTION INTRAVENOUS at 10:08

## 2020-08-22 RX ADMIN — CARVEDILOL 6.25 MG: 6.25 TABLET, FILM COATED ORAL at 10:08

## 2020-08-22 RX ADMIN — HYDRALAZINE HYDROCHLORIDE 25 MG: 25 TABLET, FILM COATED ORAL at 09:08

## 2020-08-22 RX ADMIN — HYDRALAZINE HYDROCHLORIDE 25 MG: 25 TABLET, FILM COATED ORAL at 06:08

## 2020-08-22 RX ADMIN — CEFEPIME HYDROCHLORIDE 2 G: 2 INJECTION, SOLUTION INTRAVENOUS at 11:08

## 2020-08-22 RX ADMIN — ASPIRIN 81 MG 81 MG: 81 TABLET ORAL at 10:08

## 2020-08-22 RX ADMIN — MUPIROCIN: 20 OINTMENT TOPICAL at 10:08

## 2020-08-22 RX ADMIN — HEPARIN SODIUM 5000 UNITS: 5000 INJECTION INTRAVENOUS; SUBCUTANEOUS at 02:08

## 2020-08-22 RX ADMIN — MUPIROCIN: 20 OINTMENT TOPICAL at 09:08

## 2020-08-22 RX ADMIN — CLOPIDOGREL 75 MG: 75 TABLET, FILM COATED ORAL at 10:08

## 2020-08-22 RX ADMIN — AMIODARONE HYDROCHLORIDE 400 MG: 200 TABLET ORAL at 10:08

## 2020-08-22 RX ADMIN — HYDRALAZINE HYDROCHLORIDE 25 MG: 25 TABLET, FILM COATED ORAL at 02:08

## 2020-08-22 RX ADMIN — INSULIN ASPART 2 UNITS: 100 INJECTION, SOLUTION INTRAVENOUS; SUBCUTANEOUS at 11:08

## 2020-08-22 RX ADMIN — OXYCODONE HYDROCHLORIDE AND ACETAMINOPHEN 1 TABLET: 5; 325 TABLET ORAL at 02:08

## 2020-08-22 RX ADMIN — CEFEPIME HYDROCHLORIDE 2 G: 2 INJECTION, SOLUTION INTRAVENOUS at 12:08

## 2020-08-22 RX ADMIN — HEPARIN SODIUM 5000 UNITS: 5000 INJECTION INTRAVENOUS; SUBCUTANEOUS at 09:08

## 2020-08-22 RX ADMIN — VANCOMYCIN HYDROCHLORIDE 1250 MG: 1.25 INJECTION, POWDER, LYOPHILIZED, FOR SOLUTION INTRAVENOUS at 01:08

## 2020-08-22 RX ADMIN — HYDROCODONE BITARTRATE AND ACETAMINOPHEN 1 TABLET: 5; 325 TABLET ORAL at 02:08

## 2020-08-22 RX ADMIN — HEPARIN SODIUM 5000 UNITS: 5000 INJECTION INTRAVENOUS; SUBCUTANEOUS at 06:08

## 2020-08-22 NOTE — ASSESSMENT & PLAN NOTE
-s/p L femoral - BK popliteal prosthetic bypass 8/18/20 - recovering well.   -ADA diet  -Cont PT - rec rehab; Case management notified  -Pain control prn  -Continue pertinent home meds   -DVT ppx

## 2020-08-22 NOTE — NURSING
Wound care completed per orders. L Groin dressing changed. Scant amount of serious drainage present. L Heel wound cleansed with Vashe, air dried, covered with 4x4 gauze and kerlix. Patient in severe pain with any manipulation of hip or leg r/t incisions, but other than that patient tolerated well.

## 2020-08-22 NOTE — PROGRESS NOTES
[facility]  Vascular Surgery  Progress Note    Patient Name: Suyapa Connelly  MRN: [id number]  Admission Date: 8/18/2020  Primary Care Provider: Erlinda Rahman NP    Subjective:     Interval History: No complaints.  OT ordered.    Post-Op Info:  Procedure(s) (LRB):  CREATION, BYPASS, ARTERIAL, FEMORAL TO POPLITEAL, USING GRAFT, LEFT LOWER EXTREMITY (Left)   4 Days Post-Op       Medications:  Continuous Infusions:  Scheduled Meds:   amiodarone  400 mg Oral Daily    aspirin  81 mg Oral Daily    carvediloL  6.25 mg Oral Daily    ceFEPime (MAXIPIME) IVPB  2 g Intravenous Q12H    clopidogreL  75 mg Oral Daily    heparin (porcine)  5,000 Units Subcutaneous Q8H    hydrALAZINE  25 mg Oral Q8H    mupirocin   Nasal BID    vancomycin (VANCOCIN) IVPB  1,250 mg Intravenous Q24H     PRN Meds:acetaminophen, dextrose 50%, dextrose 50%, glucagon (human recombinant), glucose, glucose, hydrALAZINE, HYDROcodone-acetaminophen, HYDROmorphone, insulin aspart U-100, labetalol, morphine, ondansetron, ondansetron, oxyCODONE-acetaminophen     Objective:     Vital Signs (Most Recent):  Temp: 98.5 °F (36.9 °C) (08/21/20 2330)  Pulse: 85 (08/21/20 2330)  Resp: 18 (08/21/20 2330)  BP: (!) 120/56 (08/21/20 2330)  SpO2: 96 % (08/21/20 2330) Vital Signs (24h Range):  Temp:  [97.8 °F (36.6 °C)-98.5 °F (36.9 °C)] 98.5 °F (36.9 °C)  Pulse:  [81-88] 85  Resp:  [17-20] 18  SpO2:  [96 %-100 %] 96 %  BP: (118-157)/(56-68) 120/56         Physical Exam  Vitals signs reviewed.   Constitutional:       General: She is not in acute distress.     Appearance: She is well-developed. She is not diaphoretic.   HENT:      Head: Normocephalic and atraumatic.   Eyes:      Conjunctiva/sclera: Conjunctivae normal.   Neck:      Musculoskeletal: Neck supple.   Cardiovascular:      Rate and Rhythm: Normal rate.      Pulses:           Femoral pulses are 2+ on the right side and 2+ on the left side.       Popliteal pulses are 2+ on the right side  and 2+ on the left side.        Dorsalis pedis pulses are detected w/ Doppler on the right side and 2+ on the left side.        Posterior tibial pulses are detected w/ Doppler on the right side and detected w/ Doppler on the left side.   Pulmonary:      Effort: Pulmonary effort is normal.   Abdominal:      General: There is no distension.      Palpations: Abdomen is soft. There is no mass.      Tenderness: There is no abdominal tenderness. There is no guarding or rebound.      Hernia: No hernia is present.   Musculoskeletal: Normal range of motion.         General: No deformity.   Feet:      Left foot:      Skin integrity: Ulcer and skin breakdown present. No erythema.   Skin:     General: Skin is warm and dry.      Findings: No rash.   Neurological:      General: No focal deficit present.      Mental Status: She is alert and oriented to person, place, and time.   Psychiatric:         Mood and Affect: Mood normal.         Significant Labs:  All pertinent labs from the last 24 hours have been reviewed.    Significant Diagnostics:  I have reviewed all pertinent imaging results/findings within the past 24 hours.    Assessment/Plan:     * S/P femoral-popliteal bypass surgery  -per above plan.   -cont to keep groin clean and dry    Critical lower limb ischemia  -s/p L femoral - BK popliteal prosthetic bypass 8/18/20 - recovering well.   -ADA diet  -Cont PT, OT ordered - rec rehab; Case management working on placement  -Pain control prn  -Continue pertinent home meds   -DVT ppx    Stage 3 chronic kidney disease  -cont home meds and volume optimization    Acute osteomyelitis of left calcaneus  -cont ID rec Abx    Non healing left heel wound  -cont wound care and offloading    Coronary artery disease involving native coronary artery of native heart without angina pectoris  -cont ASA and Plavix  -cont home meds    Dilated cardiomyopathy  -cont home meds  -cont ASA and Plavix    Mixed hyperlipidemia  -cont home  meds    Chronic combined systolic and diastolic heart failure  -cont home meds    Essential hypertension  -cont home meds    Type 2 diabetes mellitus without complication, with long-term current use of insulin  -cont sliding scale insulin    PAD (peripheral artery disease)  -cont ASA        Teddy Huber MD  Vascular Surgery  [facility] [place]

## 2020-08-22 NOTE — SUBJECTIVE & OBJECTIVE
Medications:  Continuous Infusions:  Scheduled Meds:   amiodarone  400 mg Oral Daily    aspirin  81 mg Oral Daily    carvediloL  6.25 mg Oral Daily    ceFEPime (MAXIPIME) IVPB  2 g Intravenous Q12H    clopidogreL  75 mg Oral Daily    heparin (porcine)  5,000 Units Subcutaneous Q8H    hydrALAZINE  25 mg Oral Q8H    mupirocin   Nasal BID    vancomycin (VANCOCIN) IVPB  1,250 mg Intravenous Q24H     PRN Meds:acetaminophen, dextrose 50%, dextrose 50%, glucagon (human recombinant), glucose, glucose, hydrALAZINE, HYDROcodone-acetaminophen, HYDROmorphone, insulin aspart U-100, labetalol, morphine, ondansetron, ondansetron, oxyCODONE-acetaminophen     Objective:     Vital Signs (Most Recent):  Temp: 98.5 °F (36.9 °C) (08/21/20 2330)  Pulse: 85 (08/21/20 2330)  Resp: 18 (08/21/20 2330)  BP: (!) 120/56 (08/21/20 2330)  SpO2: 96 % (08/21/20 2330) Vital Signs (24h Range):  Temp:  [97.8 °F (36.6 °C)-98.5 °F (36.9 °C)] 98.5 °F (36.9 °C)  Pulse:  [81-88] 85  Resp:  [17-20] 18  SpO2:  [96 %-100 %] 96 %  BP: (118-157)/(56-68) 120/56         Physical Exam  Vitals signs reviewed.   Constitutional:       General: She is not in acute distress.     Appearance: She is well-developed. She is not diaphoretic.   HENT:      Head: Normocephalic and atraumatic.   Eyes:      Conjunctiva/sclera: Conjunctivae normal.   Neck:      Musculoskeletal: Neck supple.   Cardiovascular:      Rate and Rhythm: Normal rate.      Pulses:           Femoral pulses are 2+ on the right side and 2+ on the left side.       Popliteal pulses are 2+ on the right side and 2+ on the left side.        Dorsalis pedis pulses are detected w/ Doppler on the right side and 2+ on the left side.        Posterior tibial pulses are detected w/ Doppler on the right side and detected w/ Doppler on the left side.   Pulmonary:      Effort: Pulmonary effort is normal.   Abdominal:      General: There is no distension.      Palpations: Abdomen is soft. There is no mass.       Tenderness: There is no abdominal tenderness. There is no guarding or rebound.      Hernia: No hernia is present.   Musculoskeletal: Normal range of motion.         General: No deformity.   Feet:      Left foot:      Skin integrity: Ulcer and skin breakdown present. No erythema.   Skin:     General: Skin is warm and dry.      Findings: No rash.   Neurological:      General: No focal deficit present.      Mental Status: She is alert and oriented to person, place, and time.   Psychiatric:         Mood and Affect: Mood normal.         Significant Labs:  All pertinent labs from the last 24 hours have been reviewed.    Significant Diagnostics:  I have reviewed all pertinent imaging results/findings within the past 24 hours.

## 2020-08-22 NOTE — SUBJECTIVE & OBJECTIVE
Medications:  Continuous Infusions:  Scheduled Meds:   amiodarone  400 mg Oral Daily    aspirin  81 mg Oral Daily    carvediloL  6.25 mg Oral Daily    ceFEPime (MAXIPIME) IVPB  2 g Intravenous Q12H    clopidogreL  75 mg Oral Daily    heparin (porcine)  5,000 Units Subcutaneous Q8H    hydrALAZINE  25 mg Oral Q8H    mupirocin   Nasal BID    vancomycin (VANCOCIN) IVPB  1,250 mg Intravenous Q24H     PRN Meds:acetaminophen, dextrose 50%, dextrose 50%, glucagon (human recombinant), glucose, glucose, hydrALAZINE, HYDROcodone-acetaminophen, HYDROmorphone, insulin aspart U-100, labetalol, morphine, ondansetron, ondansetron, oxyCODONE-acetaminophen     Objective:     Vital Signs (Most Recent):  Temp: 97.8 °F (36.6 °C) (08/21/20 1618)  Pulse: 81 (08/21/20 1618)  Resp: 17 (08/21/20 1843)  BP: (!) 140/66 (08/21/20 1618)  SpO2: 100 % (08/21/20 1618) Vital Signs (24h Range):  Temp:  [97.8 °F (36.6 °C)-98.3 °F (36.8 °C)] 97.8 °F (36.6 °C)  Pulse:  [81-88] 81  Resp:  [17-20] 17  SpO2:  [96 %-100 %] 100 %  BP: (118-140)/(58-69) 140/66     Date 08/21/20 0700 - 08/22/20 0659   Shift [id number] 8576-4525 7075-9065 24 Hour Total   INTAKE   P.O. 200 100  300   Shift Total(mL/kg) 200(2.1) 100(1.1)  300(3.2)   OUTPUT   Urine(mL/kg/hr) 100(0.1)   100   Shift Total(mL/kg) 100(1.1)   100(1.1)   Weight (kg) 93.5 93.5 93.5 93.5       Physical Exam  Vitals signs reviewed.   Constitutional:       General: She is not in acute distress.     Appearance: She is well-developed. She is not diaphoretic.   HENT:      Head: Normocephalic and atraumatic.   Eyes:      Conjunctiva/sclera: Conjunctivae normal.   Neck:      Musculoskeletal: Neck supple.   Cardiovascular:      Rate and Rhythm: Normal rate.      Pulses:           Femoral pulses are 2+ on the right side and 2+ on the left side.       Popliteal pulses are 2+ on the right side and 2+ on the left side.        Dorsalis pedis pulses are detected w/ Doppler on the right side and 2+ on the left  side.        Posterior tibial pulses are detected w/ Doppler on the right side and detected w/ Doppler on the left side.   Pulmonary:      Effort: Pulmonary effort is normal.   Abdominal:      General: There is no distension.      Palpations: Abdomen is soft. There is no mass.      Tenderness: There is no abdominal tenderness. There is no guarding or rebound.      Hernia: No hernia is present.   Musculoskeletal: Normal range of motion.         General: No deformity.   Feet:      Left foot:      Skin integrity: Ulcer and skin breakdown present. No erythema.   Skin:     General: Skin is warm and dry.      Findings: No rash.   Neurological:      General: No focal deficit present.      Mental Status: She is alert and oriented to person, place, and time.   Psychiatric:         Mood and Affect: Mood normal.         Significant Labs:  All pertinent labs from the last 24 hours have been reviewed.    Significant Diagnostics:  I have reviewed all pertinent imaging results/findings within the past 24 hours.

## 2020-08-22 NOTE — PLAN OF CARE
Problem: Occupational Therapy Goal  Goal: Occupational Therapy Goal  Description: Goals to be met by: 09/05/20     Patient will increase functional independence with ADLs by performing:    LE Dressing with Supervision.  Grooming while standing at sink with Supervision.  Toileting from toilet with Supervision for hygiene and clothing management.   Supine to sit with Supervision.  Step transfer with Supervision  Toilet transfer to toilet with Supervision.  Upper extremity exercise program x15 reps per handout, with independence.    Outcome: Ongoing, Progressing     CGA with ADLs. Pt with decreased functional activity tolerance. TTWB to LLE. OT rec inpatient rehab at discharge in order to increase independence with ADLs, iADLs, and all aspects of functional mobility.

## 2020-08-22 NOTE — PLAN OF CARE
Problem: Wound  Goal: Optimal Wound Healing  Outcome: Ongoing, Progressing     Problem: Diabetes Comorbidity  Goal: Blood Glucose Level Within Desired Range  Outcome: Ongoing, Progressing     Problem: Electrolyte Imbalance (Acute Kidney Injury/Impairment)  Goal: Serum Electrolyte Balance  Outcome: Ongoing, Progressing     Problem: Fluid Imbalance (Acute Kidney Injury/Impairment)  Goal: Optimal Fluid Balance  Outcome: Ongoing, Progressing     Problem: Hematologic Alteration (Acute Kidney Injury/Impairment)  Goal: Hemoglobin, Hematocrit and Platelets Within Normal Range  Outcome: Ongoing, Progressing     Problem: Oral Intake Inadequate (Acute Kidney Injury/Impairment)  Goal: Optimal Nutrition Intake  Outcome: Ongoing, Progressing     Problem: Renal Function Impairment (Acute Kidney Injury/Impairment)  Goal: Effective Renal Function  Outcome: Ongoing, Progressing     Problem: Adult Inpatient Plan of Care  Goal: Plan of Care Review  Outcome: Ongoing, Progressing  Goal: Patient-Specific Goal (Individualization)  Outcome: Ongoing, Progressing  Goal: Absence of Hospital-Acquired Illness or Injury  Outcome: Ongoing, Progressing  Goal: Optimal Comfort and Wellbeing  Outcome: Ongoing, Progressing  Goal: Readiness for Transition of Care  Outcome: Ongoing, Progressing  Goal: Rounds/Family Conference  Outcome: Ongoing, Progressing     Problem: Fall Injury Risk  Goal: Absence of Fall and Fall-Related Injury  Outcome: Ongoing, Progressing     Problem: Infection  Goal: Infection Symptom Resolution  Outcome: Ongoing, Progressing     Problem: Skin Injury Risk Increased  Goal: Skin Health and Integrity  Outcome: Ongoing, Progressing

## 2020-08-22 NOTE — PLAN OF CARE
Problem: Wound  Goal: Optimal Wound Healing  Outcome: Ongoing, Progressing  Intervention: Promote Effective Wound Healing  Flowsheets (Taken 8/22/2020 0511)  Oral Nutrition Promotion: rest periods promoted  Sleep/Rest Enhancement: awakenings minimized  Pain Management Interventions:   care clustered   medication offered but refused   pain management plan reviewed with patient/caregiver   quiet environment facilitated   cold applied     Problem: Diabetes Comorbidity  Goal: Blood Glucose Level Within Desired Range  Outcome: Ongoing, Progressing  Intervention: Maintain Glycemic Control  Flowsheets (Taken 8/22/2020 0511)  Glycemic Management:   blood glucose monitoring   oral hydration promoted     Problem: Electrolyte Imbalance (Acute Kidney Injury/Impairment)  Goal: Serum Electrolyte Balance  Outcome: Ongoing, Progressing     Problem: Fluid Imbalance (Acute Kidney Injury/Impairment)  Goal: Optimal Fluid Balance  Outcome: Ongoing, Progressing     Problem: Hematologic Alteration (Acute Kidney Injury/Impairment)  Goal: Hemoglobin, Hematocrit and Platelets Within Normal Range  Outcome: Ongoing, Progressing     Problem: Oral Intake Inadequate (Acute Kidney Injury/Impairment)  Goal: Optimal Nutrition Intake  Outcome: Ongoing, Progressing     Problem: Renal Function Impairment (Acute Kidney Injury/Impairment)  Goal: Effective Renal Function  Outcome: Ongoing, Progressing     Problem: Adult Inpatient Plan of Care  Goal: Plan of Care Review  Outcome: Ongoing, Progressing  Goal: Patient-Specific Goal (Individualization)  Outcome: Ongoing, Progressing  Goal: Absence of Hospital-Acquired Illness or Injury  Outcome: Ongoing, Progressing  Goal: Optimal Comfort and Wellbeing  Outcome: Ongoing, Progressing  Goal: Readiness for Transition of Care  Outcome: Ongoing, Progressing  Goal: Rounds/Family Conference  Outcome: Ongoing, Progressing     Problem: Fall Injury Risk  Goal: Absence of Fall and Fall-Related Injury  Outcome: Ongoing,  Progressing  Intervention: Identify and Manage Contributors to Fall Injury Risk  Flowsheets (Taken 8/22/2020 0511)  Self-Care Promotion:   independence encouraged   BADL personal objects within reach  Medication Review/Management: medications reviewed  Intervention: Promote Injury-Free Environment  Flowsheets (Taken 8/22/2020 0511)  Safety Promotion/Fall Prevention:   assistive device/personal item within reach   bed alarm set   commode/urinal/bedpan at bedside   lighting adjusted   medications reviewed   side rails raised x 2   instructed to call staff for mobility  Environmental Safety Modification:   assistive device/personal items within reach   clutter free environment maintained   room near unit station     Problem: Infection  Goal: Infection Symptom Resolution  Outcome: Ongoing, Progressing     Problem: Skin Injury Risk Increased  Goal: Skin Health and Integrity  Outcome: Ongoing, Progressing     Problem: Diabetes Comorbidity  Goal: Blood Glucose Level Within Desired Range  Outcome: Ongoing, Progressing  Intervention: Maintain Glycemic Control  Flowsheets (Taken 8/22/2020 0511)  Glycemic Management:   blood glucose monitoring   oral hydration promoted     Problem: Fall Injury Risk  Goal: Absence of Fall and Fall-Related Injury  Outcome: Ongoing, Progressing  Intervention: Identify and Manage Contributors to Fall Injury Risk  Flowsheets (Taken 8/22/2020 0511)  Self-Care Promotion:   independence encouraged   BADL personal objects within reach  Medication Review/Management: medications reviewed  Intervention: Promote Injury-Free Environment  Flowsheets (Taken 8/22/2020 0511)  Safety Promotion/Fall Prevention:   assistive device/personal item within reach   bed alarm set   commode/urinal/bedpan at bedside   lighting adjusted   medications reviewed   side rails raised x 2   instructed to call staff for mobility  Environmental Safety Modification:   assistive device/personal items within reach   clutter free  environment maintained   room near unit station

## 2020-08-22 NOTE — PLAN OF CARE
08/22/20 0947   Post-Acute Status   Post-Acute Authorization Placement  ([facility])   Post-Acute Placement Status Referrals Sent  ([facility], [facility])   Patient choice form signed by patient/caregiver List from [facility] Post[facility]  (1st choice - [facility])   Discharge Plan   Discharge Plan A Rehab   Discharge Plan B Home with family     TN received note from Dr Huber asking of patient is ready for discharge today; review of chart showed that patient was evaluated by therapy and is recommending IPR: TN spoke with pt and is in agreement; [facility]PR referrals sent via [facility]; MD notified via secure chat

## 2020-08-22 NOTE — ASSESSMENT & PLAN NOTE
-s/p L femoral - BK popliteal prosthetic bypass 8/18/20 - recovering well.   -ADA diet  -Cont PT, OT ordered - rec rehab; Case management working on placement  -Pain control prn  -Continue pertinent home meds   -DVT ppx

## 2020-08-22 NOTE — PT/OT/SLP EVAL
Occupational Therapy   Evaluation    Name: Suyapa Connelly  MRN: [id number]  Admitting Diagnosis:  S/P femoral-popliteal bypass surgery 4 Days Post-Op    Recommendations:     Discharge Recommendations: rehabilitation facility  Discharge Equipment Recommendations:  bath bench, walker, rolling  Barriers to discharge:  (not at PLOF; LLE TTWB; 5 steps to enter the home; decreased functional activity tolerance)    Assessment:     Suyapa Connelly is a 54 y.o. female with a medical diagnosis of S/P femoral-popliteal bypass surgery. Performance deficits affecting function: weakness, impaired endurance, decreased ROM, orthopedic precautions, impaired self care skills, decreased upper extremity function, impaired fine motor, impaired functional mobilty, decreased lower extremity function, impaired skin, gait instability, decreased safety awareness, impaired balance, pain.      CGA with ADLs. Pt with decreased functional activity tolerance. TTWB to LLE. OT rec inpatient rehab at discharge in order to increase independence with ADLs, iADLs, and all aspects of functional mobility.     Rehab Prognosis: Good; patient would benefit from acute skilled OT services to address these deficits and reach maximum level of function.       Plan:     Patient to be seen 5 x/week to address the above listed problems via self-care/home management, therapeutic activities, therapeutic exercises  · Plan of Care Expires: 09/05/20  · Plan of Care Reviewed with: patient    Subjective     Chief Complaint: pain at staples/incision to L medial knee with stand>sit; bad night last night since she got behind on her pain medication and suffered from increased L groin pain at incision   Patient/Family Comments/goals: wants to get stronger to help her family; trying to get an appetite on solid foods- upgraded from liquid diet yesterday     Occupational Profile:  Living Environment: Pt lives with her  and daughter in a [facility] with 5 NASIR and B/L HR. Bathroom  set-up: tub/shower combo.   Previous level of function: Following extended hospital stay from 12/19-02/20, PT OT recommended Inpatient rehab.  Pt D/C'd home without DME or C services. MOD I PTA with c/o generalized weakness. 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 Pt has been unable to do this since previous hospitalization.   Roles and Routines: drives   Equipment Used at Home:  wheelchair, bedside commode  Assistance upon Discharge: family     Pain/Comfort:  Pain Rating 1: 2/10(increased pain to R medial incision near knee with functional transfers)  Location - Side 1: Left  Location 1: heel  Pain Addressed 1: Pre-medicate for activity, Reposition, Distraction, Cessation of Activity  Pain Rating Post-Intervention 1: 2/10    Patients cultural, spiritual, Gnosticist conflicts given the current situation: no    Objective:     Patient found up in the chair reclined with BLE elevated with peripheral IV, telemetry, PICC line upon OT entry to room.    General Precautions: Standard, fall   Orthopedic Precautions:LLE toe touch weight bearing   Braces: (post-op shoe)     Occupational Performance:    Bed Mobility:    · NT; pt found/left up in the chair     Functional Mobility/Transfers:  · Patient completed Sit <> Stand Transfer with contact guard assistance  with  rolling walker   · Patient completed Toilet Transfer Step Transfer technique with contact guard assistance with  rolling walker and bedside commode  · Functional Mobility: Pt took a few steps bed> BSC with RW and LLE TTWB. Pt completed short, in-room functional mobility from BSC > sink >chair with RW, L post-op shoe, and CGA. Min verbal cueing for body mechanics within RW d/t elevated shoulders and forward head. Pt fatigued quickly. Pt reported increased pain with stand>sit in recliner d/t incision on medial aspect of LLE. OT demo LLE in extension with TTWB to avoid excessive knee flexion with  transfer that promotes increased pain.     Activities of Daily Living:  · Grooming: contact guard assistance for dynamic standing balance standing at the sink to wash her hands and brush her teeth; pt reports intermittent  deficits since previous hospitalization. pt was able to open the toothpaste box and toothpaste cap, but required assist to tear seal off the paste  · Upper Body Dressing: minimum assistance to don back gown  · Lower Body Dressing: pt able to doff L post op shoe with SBA; max assist to don shoe     · Toileting: contact guard assistance for dynamic standing balance while pt completed pericare standing    Cognitive/Visual Perceptual:  Cognitive/Psychosocial Skills:     -       Oriented to: Person, Place, Time and Situation   -       Follows Commands/attention:Follows multistep  commands  -       Communication: clear/fluent  -       Memory: No Deficits noted  -       Safety awareness/insight to disability: intact   -       Mood/Affect/Coping skills/emotional control: Cooperative and Pleasant  Visual/Perceptual:      -Intact  R/L discrimination      Physical Exam:  Balance:    -       seated: MOD I; standing: CGA with RW  Postural examination/scapula alignment:    -       Rounded shoulders  -       Forward head  Skin integrity: Visible skin intact and dressing to L heel intact  Edema:  no BUE edema noted  Sensation:    -       Intact  light/touch BUE  Dominant hand:    -       Right  Upper Extremity Range of Motion:     -       Right Upper Extremity: WFL  -       Left Upper Extremity: WFL  Upper Extremity Strength:    -       Right Upper Extremity: WFL  -       Left Upper Extremity: WFL   Strength:    -       Right Upper Extremity: WFL  -       Left Upper Extremity: WFL  Fine Motor Coordination:    -       Intact  Left hand, manipulation of objects and Right hand, manipulation of objects  Gross motor coordination:   minimally impaired 2* weakness    AMPAC 6 Click ADL:  AMPAC Total Score:  "21    Treatment & Education:  · Pt educated on OT role/POC.   · Importance of OOB activity with staff assistance.  · Safety during functional t/f and mobility   · White board updated   · Pt reports compliance with wearing pressure relief boot in bed and elevating LLE in the chair   · Pt c/o "pressure point" L posterior- pt pointed near rhomboids. OT edu pt on importance of posture and body mechanics, especially while staying in the chair/bed prolonged periods of time;   · OT provided deep massage which pt reported some relief   · OT demo and pt demo'd back BUE AROM: shoulder horizontal ab/dduction, forward/backward circles with shoulders, scapular retraction; demo'd upright sitting and standing posture during functional tasks   · Multiple self-care tasks/functional mobility completed- assistance level noted above   · All questions/concerns answered within OT scope of practice     Education:    Patient left up in the chair reclined with BLE elevated and LLE offloaded with all lines intact and call button in reach. Unable to reach nurse, Toyin, but OT left her a note by her [employer] phone: pt's post-op shoe is too large for pt and unsafe with TTWB- OT requested nurse to replace it with a medium post-op L shoe.     GOALS:   Multidisciplinary Problems     Occupational Therapy Goals        Problem: Occupational Therapy Goal    Goal Priority Disciplines Outcome Interventions   Occupational Therapy Goal     OT, PT/OT Ongoing, Progressing    Description: Goals to be met by: 09/05/20     Patient will increase functional independence with ADLs by performing:    LE Dressing with Supervision.  Grooming while standing at sink with Supervision.  Toileting from toilet with Supervision for hygiene and clothing management.   Supine to sit with Supervision.  Step transfer with Supervision  Toilet transfer to toilet with Supervision.  Upper extremity exercise program x15 reps per handout, with independence.               "       History:     Past Medical History:   Diagnosis Date    Anxiety     Depression     Diabetes mellitus     type 2    Diabetes mellitus, type 2     GERD (gastroesophageal reflux disease)     Hyperlipemia     Hypertension     Myocardial infarction 2010    minor-caused by stress per pt.         Past Surgical History:   Procedure Laterality Date    Angiogram - Right Extremity Right 7/9/15    angiogram-left leg  10/6/15    CATHETERIZATION OF BOTH LEFT AND RIGHT HEART N/A 12/18/2019    Procedure: CATHETERIZATION, HEART, BOTH LEFT AND RIGHT;  Surgeon: Que Fernando III, MD;  Location: [facility] CATH LAB;  Service: Cardiology;  Laterality: N/A;    CORONARY ANGIOGRAPHY N/A 12/18/2019    Procedure: ANGIOGRAM, CORONARY ARTERY;  Surgeon: Que Fernando III, MD;  Location: [facility] CATH LAB;  Service: Cardiology;  Laterality: N/A;    CORONARY ANGIOGRAPHY INCLUDING BYPASS GRAFTS WITH CATHETERIZATION OF LEFT HEART N/A 7/28/2020    Procedure: ANGIOGRAM, CORONARY, INCLUDING BYPASS GRAFT, WITH LEFT HEART CATHETERIZATION, 9 am;  Surgeon: Rachel Easley MD;  Location: [facility] CATH LAB;  Service: Cardiology;  Laterality: N/A;    CORONARY ARTERY BYPASS GRAFT (CABG) N/A 1/14/2020    Procedure: CORONARY ARTERY BYPASS GRAFT (CABG) x 1     Off Pump;  Surgeon: Huang Altamirano MD;  Location: [address];  Service: Cardiovascular;  Laterality: N/A;    CREATION OF FEMOROPOPLITEAL ARTERIAL BYPASS USING GRAFT Left 8/18/2020    Procedure: CREATION, BYPASS, ARTERIAL, FEMORAL TO POPLITEAL, USING GRAFT, LEFT LOWER EXTREMITY;  Surgeon: Teddy Huber MD;  [facility];  Service: Vascular;  Laterality: Left;  REQUEST 7:15 A.M. START----COVID NEGATIVE ON 8/17  1ST CASE STARTE PER LEANA ON 8/7/2020 @ 942AM-LO  RN PREOP 8/12/2020   T/S-----CLEARED BY CARDS-------PENDING INSURANCE    INSERTION OF TUNNELED CENTRAL VENOUS HEMODIALYSIS CATHETER N/A 1/27/2020    Procedure: Insertion, Catheter, Central Venous, Hemodialysis;   Surgeon: ESTEBAN Gomez III, MD;  Location: [facility] CATH LAB;  Service: Peripheral Vascular;  Laterality: N/A;       Time Tracking:     OT Date of Treatment: 08/22/20  OT Start Time: 1326  OT Stop Time: 1355  OT Total Time (min): 29 min    Billable Minutes:Evaluation 15 min  Self Care/Home Management 14 min  Total Time 29 min    Charisma Hartley OT  8/22/2020

## 2020-08-22 NOTE — PROGRESS NOTES
[facility]  Vascular Surgery  Progress Note    Patient Name: Suyapa Connelly  MRN: [id number]  Admission Date: 8/18/2020  Primary Care Provider: Erlinda Rahman NP    Subjective:     Interval History: Feels better.  Ready to go home    Post-Op Info:  Procedure(s) (LRB):  CREATION, BYPASS, ARTERIAL, FEMORAL TO POPLITEAL, USING GRAFT, LEFT LOWER EXTREMITY (Left)   3 Days Post-Op       Medications:  Continuous Infusions:  Scheduled Meds:   amiodarone  400 mg Oral Daily    aspirin  81 mg Oral Daily    carvediloL  6.25 mg Oral Daily    ceFEPime (MAXIPIME) IVPB  2 g Intravenous Q12H    clopidogreL  75 mg Oral Daily    heparin (porcine)  5,000 Units Subcutaneous Q8H    hydrALAZINE  25 mg Oral Q8H    mupirocin   Nasal BID    vancomycin (VANCOCIN) IVPB  1,250 mg Intravenous Q24H     PRN Meds:acetaminophen, dextrose 50%, dextrose 50%, glucagon (human recombinant), glucose, glucose, hydrALAZINE, HYDROcodone-acetaminophen, HYDROmorphone, insulin aspart U-100, labetalol, morphine, ondansetron, ondansetron, oxyCODONE-acetaminophen     Objective:     Vital Signs (Most Recent):  Temp: 97.8 °F (36.6 °C) (08/21/20 1618)  Pulse: 81 (08/21/20 1618)  Resp: 17 (08/21/20 1843)  BP: (!) 140/66 (08/21/20 1618)  SpO2: 100 % (08/21/20 1618) Vital Signs (24h Range):  Temp:  [97.8 °F (36.6 °C)-98.3 °F (36.8 °C)] 97.8 °F (36.6 °C)  Pulse:  [81-88] 81  Resp:  [17-20] 17  SpO2:  [96 %-100 %] 100 %  BP: (118-140)/(58-69) 140/66     Date 08/21/20 0700 - 08/22/20 0659   Shift 3467-8927 4641-2324 5961-2031 24 Hour Total   INTAKE   P.O. 200 100  300   Shift Total(mL/kg) 200(2.1) 100(1.1)  300(3.2)   OUTPUT   Urine(mL/kg/hr) 100(0.1)   100   Shift Total(mL/kg) 100(1.1)   100(1.1)   Weight (kg) 93.5 93.5 93.5 93.5       Physical Exam  Vitals signs reviewed.   Constitutional:       General: She is not in acute distress.     Appearance: She is well-developed. She is not diaphoretic.   HENT:      Head: Normocephalic and  atraumatic.   Eyes:      Conjunctiva/sclera: Conjunctivae normal.   Neck:      Musculoskeletal: Neck supple.   Cardiovascular:      Rate and Rhythm: Normal rate.      Pulses:           Femoral pulses are 2+ on the right side and 2+ on the left side.       Popliteal pulses are 2+ on the right side and 2+ on the left side.        Dorsalis pedis pulses are detected w/ Doppler on the right side and 2+ on the left side.        Posterior tibial pulses are detected w/ Doppler on the right side and detected w/ Doppler on the left side.   Pulmonary:      Effort: Pulmonary effort is normal.   Abdominal:      General: There is no distension.      Palpations: Abdomen is soft. There is no mass.      Tenderness: There is no abdominal tenderness. There is no guarding or rebound.      Hernia: No hernia is present.   Musculoskeletal: Normal range of motion.         General: No deformity.   Feet:      Left foot:      Skin integrity: Ulcer and skin breakdown present. No erythema.   Skin:     General: Skin is warm and dry.      Findings: No rash.   Neurological:      General: No focal deficit present.      Mental Status: She is alert and oriented to person, place, and time.   Psychiatric:         Mood and Affect: Mood normal.         Significant Labs:  All pertinent labs from the last 24 hours have been reviewed.    Significant Diagnostics:  I have reviewed all pertinent imaging results/findings within the past 24 hours.    Assessment/Plan:     * S/P femoral-popliteal bypass surgery  -per above plan.   -cont to keep groin clean and dry    Critical lower limb ischemia  -s/p L femoral - BK popliteal prosthetic bypass 8/18/20 - recovering well.   -ADA diet  -Cont PT - rec rehab; Case management notified  -Pain control prn  -Continue pertinent home meds   -DVT ppx    Stage 3 chronic kidney disease  -cont home meds and volume optimization    Acute osteomyelitis of left calcaneus  -cont ID rec Abx    Non healing left heel wound  -cont wound  care and offloading    Coronary artery disease involving native coronary artery of native heart without angina pectoris  -cont ASA and Plavix  -cont home meds    Dilated cardiomyopathy  -cont home meds  -cont ASA and Plavix    Mixed hyperlipidemia  -cont home meds    Chronic combined systolic and diastolic heart failure  -cont home meds    Essential hypertension  -cont home meds    Type 2 diabetes mellitus without complication, with long-term current use of insulin  -cont sliding scale insulin    PAD (peripheral artery disease)  -cont ASA        Teddy Huber MD  Vascular Surgery  [facility] [place]

## 2020-08-22 NOTE — PLAN OF CARE
Problem: Physical Therapy Goal  Goal: Physical Therapy Goal  Description: Goals to be met by: 2020     Patient will increase functional independence with mobility by performin. Supine to sit with Modified [place]  2. Bed to chair transfer with Modified [place]   3. Gait  x 25' feet with Contact Guard Assistance using Rolling Walker.   4. Wheelchair propulsion x150 feet with Modified [place] using bilateral uppper extremities  5. Ascend/descend 5 stair with left Handrails Minimal Assistance   6. Lower extremity exercise program x10 reps per handout, with supervision    Outcome: Ongoing, Progressing   pt very pleasant and motivated to get back to PLOF. Pt Mod I with supine to sit, Swith sit to stand with RW with TTWB.  Pt amb with rw 10 ft x 3 with 2 seated rest breaks 2/2 pain and fatigue.  Pt with step to gt pattern,  decreased nirav, decreased step height and length, decreased wt shifting. Pt will benefit from further therapy to return to PLOF.

## 2020-08-22 NOTE — PT/OT/SLP PROGRESS
"Physical Therapy Treatment    Patient Name:  Suyapa Connelly   MRN:  [id number]    Recommendations:     Discharge Recommendations:  rehabilitation facility   Discharge Equipment Recommendations: walker, rolling, bedside commode, bath bench   Barriers to discharge: pt with decreased mobility. pt has 5 steps to enter home and is unable to ascend steps at this moment.    Assessment:     Suyapa Connelly is a 54 y.o. female admitted with a medical diagnosis of S/P femoral-popliteal bypass surgery.  She presents with the following impairments/functional limitations:  weakness, impaired endurance, impaired sensation, impaired self care skills, impaired functional mobilty, gait instability, impaired balance, decreased lower extremity function, pain, decreased safety awareness, impaired skin, edema, impaired cardiopulmonary response to activity, orthopedic precautions.    pt very pleasant and motivated to get back to PLOF. Pt Mod I with supine to sit, Swith sit to stand with RW with TTWB.  Pt amb with rw 10 ft x 3 with 2 seated rest breaks 2/2 pain and fatigue.  Pt with step to gt pattern,  decreased nirav, decreased step height and length, decreased wt shifting. Pt will benefit from further therapy to return to PLOF.            Rehab Prognosis: Good; patient would benefit from acute skilled PT services to address these deficits and reach maximum level of function.    Recent Surgery: Procedure(s) (LRB):  CREATION, BYPASS, ARTERIAL, FEMORAL TO POPLITEAL, USING GRAFT, LEFT LOWER EXTREMITY (Left) 4 Days Post-Op    Plan:     During this hospitalization, patient to be seen 6 x/week to address the identified rehab impairments via gait training, therapeutic activities, therapeutic exercises, wheelchair management/training and progress toward the following goals:    · Plan of Care Expires:  09/03/20    Subjective     Chief Complaint: want to get better  Patient/Family Comments/goals: "I'll do whatever you ask of me.  I gotta get home " "to my family."  Pain/Comfort:  · Pain Rating 1: 6/10  · Location - Side 1: Left  · Location - Orientation 1: medial  · Location 1: thigh  · Pain Addressed 1: Pre-medicate for activity, Reposition, Distraction, Cessation of Activity  · Pain Rating Post-Intervention 1: 6/10      Objective:     Communicated with nurse prior to session.  Patient found HOB elevated with telemetry, peripheral IV upon PT entry to room.     General Precautions: Standard, fall   Orthopedic Precautions:LLE toe touch weight bearing   Braces: (ortho shoe)     Functional Mobility:  · Bed Mobility:     · Scooting: modified independence  · Supine to Sit: modified independence  · Transfers:     · Sit to Stand:  supervision with rolling walker  · Bed to Chair: contact guard assistance with  rolling walker  using  Step Transfer  · Gait: 10 ft x 3 with rw with CGA with TTWB  · Balance: F/F+ standing       AM-PAC 6 CLICK MOBILITY  Turning over in bed (including adjusting bedclothes, sheets and blankets)?: 4  Sitting down on and standing up from a chair with arms (e.g., wheelchair, bedside commode, etc.): 4  Moving from lying on back to sitting on the side of the bed?: 4  Moving to and from a bed to a chair (including a wheelchair)?: 3  Need to walk in hospital room?: 3  Climbing 3-5 steps with a railing?: 2  Basic Mobility Total Score: 20       Therapeutic Activities and Exercises:   sit to stand x 2 /c RW  from bed x1 /c rw  from chair with SBA  Sat EOB x 6 min with good balance    Patient left up in chair with all lines intact, call button in reach and nurse notified..    GOALS:   Multidisciplinary Problems     Physical Therapy Goals        Problem: Physical Therapy Goal    Goal Priority Disciplines Outcome Goal Variances Interventions   Physical Therapy Goal     PT, PT/OT Ongoing, Progressing     Description: Goals to be met by: 2020     Patient will increase functional independence with mobility by performin. Supine to sit with " Modified [place]  2. Bed to chair transfer with Modified [place]   3. Gait  x 25' feet with Contact Guard Assistance using Rolling Walker.   4. Wheelchair propulsion x150 feet with Modified [place] using bilateral uppper extremities  5. Ascend/descend 5 stair with left Handrails Minimal Assistance   6. Lower extremity exercise program x10 reps per handout, with supervision                     Time Tracking:     PT Received On: 08/22/20  PT Start Time: 1124     PT Stop Time: 1149  PT Total Time (min): 25 min     Billable Minutes: Gait Training 15 and Therapeutic Activity 10    Treatment Type: Treatment  PT/PTA: PTA     PTA Visit Number: 1     Jefry Eduardo, PTA  08/22/2020

## 2020-08-23 LAB
ANION GAP SERPL CALC-SCNC: 12 MMOL/L (ref 8–16)
APTT BLDCRRT: 31.3 SEC (ref 21–32)
BASOPHILS # BLD AUTO: 0.04 K/UL (ref 0–0.2)
BASOPHILS NFR BLD: 0.5 % (ref 0–1.9)
BUN SERPL-MCNC: 21 MG/DL (ref 6–20)
CALCIUM SERPL-MCNC: 8.3 MG/DL (ref 8.7–10.5)
CHLORIDE SERPL-SCNC: 105 MMOL/L (ref 95–110)
CO2 SERPL-SCNC: 20 MMOL/L (ref 23–29)
CREAT SERPL-MCNC: 1.5 MG/DL (ref 0.5–1.4)
DIFFERENTIAL METHOD: ABNORMAL
EOSINOPHIL # BLD AUTO: 0.7 K/UL (ref 0–0.5)
EOSINOPHIL NFR BLD: 9.1 % (ref 0–8)
ERYTHROCYTE [DISTWIDTH] IN BLOOD BY AUTOMATED COUNT: 13.8 % (ref 11.5–14.5)
EST. GFR  (AFRICAN AMERICAN): 46 ML/MIN/1.73 M^2
EST. GFR  (NON AFRICAN AMERICAN): 39 ML/MIN/1.73 M^2
GLUCOSE SERPL-MCNC: 167 MG/DL (ref 70–110)
HCT VFR BLD AUTO: 23.8 % (ref 37–48.5)
HGB BLD-MCNC: 7.4 G/DL (ref 12–16)
IMM GRANULOCYTES # BLD AUTO: 0.02 K/UL (ref 0–0.04)
IMM GRANULOCYTES NFR BLD AUTO: 0.3 % (ref 0–0.5)
INR PPP: 0.9 (ref 0.8–1.2)
LYMPHOCYTES # BLD AUTO: 1.6 K/UL (ref 1–4.8)
LYMPHOCYTES NFR BLD: 22.2 % (ref 18–48)
MAGNESIUM SERPL-MCNC: 1.7 MG/DL (ref 1.6–2.6)
MCH RBC QN AUTO: 31.2 PG (ref 27–31)
MCHC RBC AUTO-ENTMCNC: 31.1 G/DL (ref 32–36)
MCV RBC AUTO: 100 FL (ref 82–98)
MONOCYTES # BLD AUTO: 0.8 K/UL (ref 0.3–1)
MONOCYTES NFR BLD: 11.3 % (ref 4–15)
NEUTROPHILS # BLD AUTO: 4.1 K/UL (ref 1.8–7.7)
NEUTROPHILS NFR BLD: 56.6 % (ref 38–73)
NRBC BLD-RTO: 0 /100 WBC
PHOSPHATE SERPL-MCNC: 2.7 MG/DL (ref 2.7–4.5)
PLATELET # BLD AUTO: 379 K/UL (ref 150–350)
PMV BLD AUTO: 10.9 FL (ref 9.2–12.9)
POCT GLUCOSE: 173 MG/DL (ref 70–110)
POCT GLUCOSE: 191 MG/DL (ref 70–110)
POCT GLUCOSE: 203 MG/DL (ref 70–110)
POCT GLUCOSE: 213 MG/DL (ref 70–110)
POTASSIUM SERPL-SCNC: 3.2 MMOL/L (ref 3.5–5.1)
PROTHROMBIN TIME: 10 SEC (ref 9–12.5)
RBC # BLD AUTO: 2.37 M/UL (ref 4–5.4)
SODIUM SERPL-SCNC: 137 MMOL/L (ref 136–145)
WBC # BLD AUTO: 7.33 K/UL (ref 3.9–12.7)

## 2020-08-23 PROCEDURE — 21400001 HC TELEMETRY ROOM

## 2020-08-23 PROCEDURE — 85730 THROMBOPLASTIN TIME PARTIAL: CPT

## 2020-08-23 PROCEDURE — 36415 COLL VENOUS BLD VENIPUNCTURE: CPT

## 2020-08-23 PROCEDURE — 63600175 PHARM REV CODE 636 W HCPCS: Performed by: SURGERY

## 2020-08-23 PROCEDURE — 25000003 PHARM REV CODE 250: Performed by: INTERNAL MEDICINE

## 2020-08-23 PROCEDURE — 99024 POSTOP FOLLOW-UP VISIT: CPT | Mod: ,,, | Performed by: SURGERY

## 2020-08-23 PROCEDURE — 63600175 PHARM REV CODE 636 W HCPCS: Performed by: INTERNAL MEDICINE

## 2020-08-23 PROCEDURE — 80048 BASIC METABOLIC PNL TOTAL CA: CPT

## 2020-08-23 PROCEDURE — 97110 THERAPEUTIC EXERCISES: CPT

## 2020-08-23 PROCEDURE — 99024 PR POST-OP FOLLOW-UP VISIT: ICD-10-PCS | Mod: ,,, | Performed by: SURGERY

## 2020-08-23 PROCEDURE — 85025 COMPLETE CBC W/AUTO DIFF WBC: CPT

## 2020-08-23 PROCEDURE — 83735 ASSAY OF MAGNESIUM: CPT

## 2020-08-23 PROCEDURE — 25000003 PHARM REV CODE 250: Performed by: SURGERY

## 2020-08-23 PROCEDURE — 97116 GAIT TRAINING THERAPY: CPT

## 2020-08-23 PROCEDURE — 84100 ASSAY OF PHOSPHORUS: CPT

## 2020-08-23 PROCEDURE — 85610 PROTHROMBIN TIME: CPT

## 2020-08-23 RX ADMIN — CARVEDILOL 6.25 MG: 6.25 TABLET, FILM COATED ORAL at 09:08

## 2020-08-23 RX ADMIN — CEFEPIME HYDROCHLORIDE 2 G: 2 INJECTION, SOLUTION INTRAVENOUS at 11:08

## 2020-08-23 RX ADMIN — HEPARIN SODIUM 5000 UNITS: 5000 INJECTION INTRAVENOUS; SUBCUTANEOUS at 02:08

## 2020-08-23 RX ADMIN — HYDROCODONE BITARTRATE AND ACETAMINOPHEN 1 TABLET: 5; 325 TABLET ORAL at 11:08

## 2020-08-23 RX ADMIN — VANCOMYCIN HYDROCHLORIDE 1250 MG: 1.25 INJECTION, POWDER, LYOPHILIZED, FOR SOLUTION INTRAVENOUS at 12:08

## 2020-08-23 RX ADMIN — INSULIN ASPART 2 UNITS: 100 INJECTION, SOLUTION INTRAVENOUS; SUBCUTANEOUS at 04:08

## 2020-08-23 RX ADMIN — INSULIN ASPART 1 UNITS: 100 INJECTION, SOLUTION INTRAVENOUS; SUBCUTANEOUS at 09:08

## 2020-08-23 RX ADMIN — AMIODARONE HYDROCHLORIDE 400 MG: 200 TABLET ORAL at 09:08

## 2020-08-23 RX ADMIN — ASPIRIN 81 MG 81 MG: 81 TABLET ORAL at 09:08

## 2020-08-23 RX ADMIN — CLOPIDOGREL 75 MG: 75 TABLET, FILM COATED ORAL at 09:08

## 2020-08-23 RX ADMIN — HYDROCODONE BITARTRATE AND ACETAMINOPHEN 1 TABLET: 5; 325 TABLET ORAL at 02:08

## 2020-08-23 RX ADMIN — HYDROCODONE BITARTRATE AND ACETAMINOPHEN 1 TABLET: 5; 325 TABLET ORAL at 05:08

## 2020-08-23 RX ADMIN — HEPARIN SODIUM 5000 UNITS: 5000 INJECTION INTRAVENOUS; SUBCUTANEOUS at 05:08

## 2020-08-23 RX ADMIN — HYDRALAZINE HYDROCHLORIDE 25 MG: 25 TABLET, FILM COATED ORAL at 09:08

## 2020-08-23 RX ADMIN — HYDRALAZINE HYDROCHLORIDE 25 MG: 25 TABLET, FILM COATED ORAL at 02:08

## 2020-08-23 RX ADMIN — MUPIROCIN: 20 OINTMENT TOPICAL at 09:08

## 2020-08-23 RX ADMIN — HEPARIN SODIUM 5000 UNITS: 5000 INJECTION INTRAVENOUS; SUBCUTANEOUS at 09:08

## 2020-08-23 RX ADMIN — HYDRALAZINE HYDROCHLORIDE 25 MG: 25 TABLET, FILM COATED ORAL at 05:08
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
## 2020-08-23 NOTE — PLAN OF CARE
Problem: Diabetes Comorbidity  Goal: Blood Glucose Level Within Desired Range  Outcome: Ongoing, Progressing  Intervention: Maintain Glycemic Control  Flowsheets (Taken 8/23/2020 0225)  Glycemic Management:   blood glucose monitoring   oral hydration promoted   supplemental insulin given
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
## 2020-08-23 NOTE — PLAN OF CARE
Problem: Physical Therapy Goal  Goal: Physical Therapy Goal  Description: Goals to be met by: 2020     Patient will increase functional independence with mobility by performin. Supine to sit with Modified [place]  2. Bed to chair transfer with Modified [place]   3. Gait  x 25' feet with Contact Guard Assistance using Rolling Walker.   4. Wheelchair propulsion x150 feet with Modified [place] using bilateral uppper extremities  5. Ascend/descend 5 stair with left Handrails Minimal Assistance   6. Lower extremity exercise program x10 reps per handout, with supervision    Outcome: Ongoing, Progressing   Pt progressing, continue with POC.  Awaiting offloading shoe.  IPR recommended, but if patient D/C's home will need outpatient wound care and PT.

## 2020-08-24 ENCOUNTER — TELEPHONE (OUTPATIENT)
Dept: VASCULAR SURGERY | Facility: CLINIC | Age: 55
End: 2020-08-24

## 2020-08-24 VITALS
RESPIRATION RATE: 16 BRPM | HEART RATE: 89 BPM | TEMPERATURE: 98 F | SYSTOLIC BLOOD PRESSURE: 129 MMHG | OXYGEN SATURATION: 100 % | DIASTOLIC BLOOD PRESSURE: 69 MMHG | WEIGHT: 207.25 LBS | HEIGHT: 65 IN | BODY MASS INDEX: 34.53 KG/M2

## 2020-08-24 DIAGNOSIS — I70.229 CRITICAL LOWER LIMB ISCHEMIA: Primary | ICD-10-CM

## 2020-08-24 LAB
ANION GAP SERPL CALC-SCNC: 11 MMOL/L (ref 8–16)
APTT BLDCRRT: 30.4 SEC (ref 21–32)
BASOPHILS # BLD AUTO: 0.06 K/UL (ref 0–0.2)
BASOPHILS NFR BLD: 0.7 % (ref 0–1.9)
BUN SERPL-MCNC: 18 MG/DL (ref 6–20)
CALCIUM SERPL-MCNC: 8.5 MG/DL (ref 8.7–10.5)
CHLORIDE SERPL-SCNC: 107 MMOL/L (ref 95–110)
CO2 SERPL-SCNC: 20 MMOL/L (ref 23–29)
CREAT SERPL-MCNC: 1.5 MG/DL (ref 0.5–1.4)
DIFFERENTIAL METHOD: ABNORMAL
EOSINOPHIL # BLD AUTO: 0.5 K/UL (ref 0–0.5)
EOSINOPHIL NFR BLD: 6.6 % (ref 0–8)
ERYTHROCYTE [DISTWIDTH] IN BLOOD BY AUTOMATED COUNT: 14.1 % (ref 11.5–14.5)
EST. GFR  (AFRICAN AMERICAN): 46 ML/MIN/1.73 M^2
EST. GFR  (NON AFRICAN AMERICAN): 39 ML/MIN/1.73 M^2
GLUCOSE SERPL-MCNC: 161 MG/DL (ref 70–110)
HCT VFR BLD AUTO: 23.5 % (ref 37–48.5)
HGB BLD-MCNC: 7.4 G/DL (ref 12–16)
IMM GRANULOCYTES # BLD AUTO: 0.05 K/UL (ref 0–0.04)
IMM GRANULOCYTES NFR BLD AUTO: 0.6 % (ref 0–0.5)
INR PPP: 0.9 (ref 0.8–1.2)
LYMPHOCYTES # BLD AUTO: 1.9 K/UL (ref 1–4.8)
LYMPHOCYTES NFR BLD: 24 % (ref 18–48)
MAGNESIUM SERPL-MCNC: 1.7 MG/DL (ref 1.6–2.6)
MCH RBC QN AUTO: 31.5 PG (ref 27–31)
MCHC RBC AUTO-ENTMCNC: 31.5 G/DL (ref 32–36)
MCV RBC AUTO: 100 FL (ref 82–98)
MONOCYTES # BLD AUTO: 1 K/UL (ref 0.3–1)
MONOCYTES NFR BLD: 11.9 % (ref 4–15)
NEUTROPHILS # BLD AUTO: 4.6 K/UL (ref 1.8–7.7)
NEUTROPHILS NFR BLD: 56.2 % (ref 38–73)
NRBC BLD-RTO: 0 /100 WBC
PHOSPHATE SERPL-MCNC: 2.7 MG/DL (ref 2.7–4.5)
PLATELET # BLD AUTO: 399 K/UL (ref 150–350)
PMV BLD AUTO: 10.7 FL (ref 9.2–12.9)
POCT GLUCOSE: 168 MG/DL (ref 70–110)
POCT GLUCOSE: 183 MG/DL (ref 70–110)
POTASSIUM SERPL-SCNC: 3.4 MMOL/L (ref 3.5–5.1)
PROTHROMBIN TIME: 10.3 SEC (ref 9–12.5)
RBC # BLD AUTO: 2.35 M/UL (ref 4–5.4)
SODIUM SERPL-SCNC: 138 MMOL/L (ref 136–145)
VANCOMYCIN TROUGH SERPL-MCNC: 28.6 UG/ML (ref 10–22)
WBC # BLD AUTO: 8.09 K/UL (ref 3.9–12.7)

## 2020-08-24 PROCEDURE — 80048 BASIC METABOLIC PNL TOTAL CA: CPT

## 2020-08-24 PROCEDURE — 84100 ASSAY OF PHOSPHORUS: CPT

## 2020-08-24 PROCEDURE — 85610 PROTHROMBIN TIME: CPT

## 2020-08-24 PROCEDURE — 97535 SELF CARE MNGMENT TRAINING: CPT | Mod: CO

## 2020-08-24 PROCEDURE — 85730 THROMBOPLASTIN TIME PARTIAL: CPT

## 2020-08-24 PROCEDURE — 63600175 PHARM REV CODE 636 W HCPCS: Performed by: SURGERY

## 2020-08-24 PROCEDURE — 36415 COLL VENOUS BLD VENIPUNCTURE: CPT

## 2020-08-24 PROCEDURE — 83735 ASSAY OF MAGNESIUM: CPT

## 2020-08-24 PROCEDURE — 80202 ASSAY OF VANCOMYCIN: CPT

## 2020-08-24 PROCEDURE — 25000003 PHARM REV CODE 250: Performed by: SURGERY

## 2020-08-24 PROCEDURE — 85025 COMPLETE CBC W/AUTO DIFF WBC: CPT

## 2020-08-24 PROCEDURE — 99024 POSTOP FOLLOW-UP VISIT: CPT | Mod: ,,, | Performed by: SURGERY

## 2020-08-24 PROCEDURE — 99024 PR POST-OP FOLLOW-UP VISIT: ICD-10-PCS | Mod: ,,, | Performed by: SURGERY

## 2020-08-24 RX ORDER — OXYCODONE AND ACETAMINOPHEN 5; 325 MG/1; MG/1
1 TABLET ORAL EVERY 6 HOURS PRN
Qty: 25 TABLET | Refills: 0 | Status: SHIPPED | OUTPATIENT
Start: 2020-08-24 | End: 2021-01-06 | Stop reason: ALTCHOICE

## 2020-08-24 RX ADMIN — AMIODARONE HYDROCHLORIDE 400 MG: 200 TABLET ORAL at 08:08

## 2020-08-24 RX ADMIN — ASPIRIN 81 MG 81 MG: 81 TABLET ORAL at 08:08

## 2020-08-24 RX ADMIN — HEPARIN SODIUM 5000 UNITS: 5000 INJECTION INTRAVENOUS; SUBCUTANEOUS at 05:08

## 2020-08-24 RX ADMIN — CARVEDILOL 6.25 MG: 6.25 TABLET, FILM COATED ORAL at 08:08

## 2020-08-24 RX ADMIN — CLOPIDOGREL 75 MG: 75 TABLET, FILM COATED ORAL at 08:08

## 2020-08-24 RX ADMIN — HYDRALAZINE HYDROCHLORIDE 25 MG: 25 TABLET, FILM COATED ORAL at 05:08

## 2020-08-24 RX ADMIN — HYDROCODONE BITARTRATE AND ACETAMINOPHEN 1 TABLET: 5; 325 TABLET ORAL at 05:08

## 2020-08-24 NOTE — ANESTHESIA POSTPROCEDURE EVALUATION
Anesthesia Post Evaluation    Patient: Suyapa Connelly    Procedure(s) Performed: Procedure(s) (LRB):  CREATION, BYPASS, ARTERIAL, FEMORAL TO POPLITEAL, USING GRAFT, LEFT LOWER EXTREMITY (Left)    Final Anesthesia Type: general    Patient location during evaluation: PACU  Patient participation: Yes- Able to Participate  Level of consciousness: awake and alert  Post-procedure vital signs: reviewed and stable  Pain management: adequate  Airway patency: patent    PONV status at discharge: No PONV  Anesthetic complications: no      Cardiovascular status: blood pressure returned to baseline and hemodynamically stable  Respiratory status: unassisted and spontaneous ventilation  Hydration status: euvolemic  Follow-up not needed.          Vitals Value Taken Time   /63 08/24/20 0756   Temp 36.8 °C (98.3 °F) 08/24/20 0756   Pulse 89 08/24/20 0756   Resp 18 08/24/20 0756   SpO2 99 % 08/24/20 0756         Event Time   Out of Recovery 15:00:04         Pain/Shannan Score: Pain Rating Prior to Med Admin: 8 (8/24/2020  5:44 AM)

## 2020-08-24 NOTE — NURSING
Called Dr Huber because therapy recommended rolling walker,shower chair and Commode chair.Patient states that she has shower chair and commode chair but needs walker

## 2020-08-24 NOTE — PT/OT/SLP DISCHARGE
Occupational Therapy Discharge Summary    Suyapa Connelly  MRN: [id number]   Principal Problem: S/P femoral-popliteal bypass surgery      Patient Discharged from acute Occupational Therapy on 08/24/20.  Please refer to prior OT notes for functional status.    Assessment:      Patient appropriate for care in another setting.    Objective:     GOALS:   Multidisciplinary Problems     Occupational Therapy Goals        Problem: Occupational Therapy Goal    Goal Priority Disciplines Outcome Interventions   Occupational Therapy Goal     OT, PT/OT Ongoing, Progressing    Description: Goals to be met by: 09/05/20     Patient will increase functional independence with ADLs by performing:    LE Dressing with Supervision.  Grooming while standing at sink with Supervision.  Toileting from toilet with Supervision for hygiene and clothing management.   Supine to sit with Supervision.  Step transfer with Supervision  Toilet transfer to toilet with Supervision.  Upper extremity exercise program x15 reps per handout, with independence.                     Reasons for Discontinuation of Therapy Services  Transfer to alternate level of care.      Plan:     Patient Discharged to: OT rec. IPR; pt declined, d/c home with OP OT    Charisma Hartley OT  8/24/2020

## 2020-08-24 NOTE — PT/OT/SLP PROGRESS
Physical Therapy      Patient Name:  Suyapa Connelly   MRN:  [id number]    Patient not seen today secondary to Unavailable x 2 attempts ( (Amira) presents for patient D/C education 1050 AM , second attempt 1139 AM, pt found sitting EOB however patient's room is being mopped per house keeping ). Will follow-up as able later hour/day .    Gisela Caballero, PTA

## 2020-08-24 NOTE — PROGRESS NOTES
WRITTEN HEALTHCARE DISCHARGE INFORMATION     Things that YOU are RESPONSIBLE for to Manage Your Care At Home:    1. Getting your prescriptions filled.  2. Taking you medications as directed. DO NOT MISS ANY DOSES!  3. Going to your follow-up doctor appointments. This is important because it allows the doctor to monitor your progress and to determine if any changes need to be made to your treatment plan.    If you are unable to make your follow up appointments, please call the number listed and reschedule this appointment.     ____________HELP AT HOME____________________    Experiencing any SIGNS or SYMPTOMS: YOU CAN    Schedule a same day appopintment with your Primary Care Doctor or  you can call [facility] On Call Nurse Care Line for 24/7 assistance at [phone]    If you are experience any signs or symptoms that have become severe, Call 911 and come to your nearest Emergency Room.    Thank you for choosing [facility] and allowing us to care for you.   From your care management team: Suad CARTER [facility] [phone]    You should receive a call from [facility] Discharge Department within 48-72 hours to help manage your care after discharge. Please try to make sure that you answer your phone for this important phone call.  Follow-up Information     Teddy Huber MD On 9/14/2020.    Specialties: Vascular Surgery, Surgery  Why: Outpatient Services, Vascular, follow-up appointment @ 11:15am  Contact information:  [address]  [facility] [address]  [phone]             Lindsay De Anda MD On 8/28/2020.    Specialty: Infectious Diseases  Why: Outpatient Services, Infectious Diease, follow-up appointment at 1:30pm  Contact information:  [address] [facility] [address]  [phone]             [facility] On 8/26/2020.    Specialty: Wound Care  Why: Outpatient Services, wound care appointment at 9:00am.   Contact information:  [address] [facility]  [address]  [phone]

## 2020-08-24 NOTE — PT/OT/SLP PROGRESS
Occupational Therapy   Treatment    Name: Suyapa Connelly  MRN: [id number]  Admitting Diagnosis:  S/P femoral-popliteal bypass surgery  6 Days Post-Op    Recommendations:     Discharge Recommendations: rehabilitation facility  Discharge Equipment Recommendations:  bath bench, walker, rolling  Barriers to discharge:       Assessment:     Suyapa Connelly is a 54 y.o. female with a medical diagnosis of S/P femoral-popliteal bypass surgery.  She presents with the following performance deficits affecting function: weakness, impaired endurance, impaired self care skills, impaired functional mobilty, gait instability, impaired balance, decreased coordination, decreased safety awareness, decreased upper extremity function, decreased lower extremity function, decreased ROM, impaired skin.     Pt progressing well toward OT goals-obtained proper fitting L post op shoe this session. Pt able to perform short distance in room functional mobility with S/SBA and RW with post op shoe in place, LLE TTWB. Pt declined IPR-discharging home with OP therapy.      Rehab Prognosis:  Good; patient would benefit from acute skilled OT services to address these deficits and reach maximum level of function.       Plan:     Patient to be seen 5 x/week to address the above listed problems via self-care/home management, therapeutic activities, therapeutic exercises  · Plan of Care Expires: 09/05/20  · Plan of Care Reviewed with: patient    Subjective   Pt agreeable to therapy.    Pain/Comfort:  · Pain Rating 1: (no c/o of pain)    Objective:     Communicated with: Nurse Pink prior to session.  Patient found HOB elevated with peripheral IV, telemetry upon OT entry to room.    General Precautions: Standard, fall   Orthopedic Precautions:LLE toe touch weight bearing   Braces: Left post-op shoe       Occupational Performance:     Bed Mobility:    · Patient completed Supine to Sit with supervision      Functional Mobility/Transfers:  · Patient completed  Sit <> Stand Transfer with supervision and stand by assistance  with  rolling walker   · Patient completed BSC>bedTransfer Stand Pivot technique with supervision and stand by assistance with  no AD  · Functional Mobility: Pt able to ambulate short distanced within room S/SBA with RW, LLE TTWB with L post-op shoe. Pt demos compliance with precautions.     Activities of Daily Living:  · Lower Body Dressing: set-up assistance to chadwick/doff L post-op shoe seated EOB x 2  Trials. Pt provided with new L post-op shoe today- appeared to be too small with toes off front edge of shoe. BAZZI obtained larger size for patient prior to discharge ( pt reported much more comfortable with larger size)  · Toileting: supervision for anterior pericare (partial stand with unilateral UE support on BSC arm rest, LLE TTWB)      [facility] 6 Click ADL: 23    Treatment & Education:   Pt re-ducated on OT role/POC   Importance of OOB activity with staff assistance to maximize recovery. Must wear L post-op shoe with OOB activity, TTWB   Importance of performing UE/LE therex throughout day to increase strength and endurance. Pt provided with BUE AROM HEP handout in all available planes of motion; instructed to perform x15 reps, 2-3x/day as able. Pt able to return demo of each exercise: digit I-V flex/ext, wrist flex/ext, forearm pronation/supination, elbow flex/ext, forward punches, shoulder flex/ext, shoulder shrugs, shoulder abd/add, scapular retraction/protraction, forward/backward arm circles   Safety and proper techniques during functional t/f and mobility with use of RW   Home safety/fall prevention education; provided with handout   Education on donning/doffing post-op shoe   Multiple self-care tasks/functional mobility completed- assistance level noted above    Pt verbalizes understanding of all education provided this date. All questions/concerns answered within OT scope of practice      Patient left seated EOB with all lines intact,  call button in reach and nurse Apryl  NotifiedEducation:   and present.    GOALS:   Multidisciplinary Problems     Occupational Therapy Goals        Problem: Occupational Therapy Goal    Goal Priority Disciplines Outcome Interventions   Occupational Therapy Goal     OT, PT/OT Ongoing, Progressing    Description: Goals to be met by: 09/05/20     Patient will increase functional independence with ADLs by performing:    LE Dressing with Supervision.  Grooming while standing at sink with Supervision.  Toileting from toilet with Supervision for hygiene and clothing management.   Supine to sit with Supervision.  Step transfer with Supervision  Toilet transfer to toilet with Supervision.  Upper extremity exercise program x15 reps per handout, with independence.                     Time Tracking:     OT Date of Treatment: 08/24/20  OT Start Time: 1215  OT Stop Time: 1240  OT Total Time (min): 25 min    Billable Minutes:Self Care/Home Management 25    MACKENZIE Oliveira  8/24/2020

## 2020-08-24 NOTE — SUBJECTIVE & OBJECTIVE
Medications:  Continuous Infusions:  Scheduled Meds:   amiodarone  400 mg Oral Daily    aspirin  81 mg Oral Daily    carvediloL  6.25 mg Oral Daily    ceFEPime (MAXIPIME) IVPB  2 g Intravenous Q12H    clopidogreL  75 mg Oral Daily    heparin (porcine)  5,000 Units Subcutaneous Q8H    hydrALAZINE  25 mg Oral Q8H    vancomycin (VANCOCIN) IVPB  1,250 mg Intravenous Q24H     PRN Meds:acetaminophen, dextrose 50%, dextrose 50%, glucagon (human recombinant), glucose, glucose, hydrALAZINE, HYDROcodone-acetaminophen, HYDROmorphone, insulin aspart U-100, labetalol, morphine, ondansetron, ondansetron, oxyCODONE-acetaminophen     Objective:     Vital Signs (Most Recent):  Temp: 98.3 °F (36.8 °C) (08/24/20 0756)  Pulse: 89 (08/24/20 0756)  Resp: 18 (08/24/20 0756)  BP: 138/63 (08/24/20 0756)  SpO2: 99 % (08/24/20 0756) Vital Signs (24h Range):  Temp:  [97.9 °F (36.6 °C)-98.7 °F (37.1 °C)] 98.3 °F (36.8 °C)  Pulse:  [72-89] 89  Resp:  [17-20] 18  SpO2:  [97 %-99 %] 99 %  BP: (114-166)/(55-74) 138/63         Physical Exam  Vitals signs reviewed.   Constitutional:       General: She is not in acute distress.     Appearance: She is well-developed. She is not diaphoretic.   HENT:      Head: Normocephalic and atraumatic.   Eyes:      Conjunctiva/sclera: Conjunctivae normal.   Neck:      Musculoskeletal: Neck supple.   Cardiovascular:      Rate and Rhythm: Normal rate.      Pulses:           Femoral pulses are 2+ on the right side and 2+ on the left side.       Popliteal pulses are 2+ on the right side and 2+ on the left side.        Dorsalis pedis pulses are detected w/ Doppler on the right side and 2+ on the left side.        Posterior tibial pulses are detected w/ Doppler on the right side and detected w/ Doppler on the left side.   Pulmonary:      Effort: Pulmonary effort is normal.   Abdominal:      General: There is no distension.      Palpations: Abdomen is soft. There is no mass.      Tenderness: There is no abdominal  tenderness. There is no guarding or rebound.      Hernia: No hernia is present.   Musculoskeletal: Normal range of motion.         General: No deformity.   Feet:      Left foot:      Skin integrity: Ulcer and skin breakdown present. No erythema.   Skin:     General: Skin is warm and dry.      Findings: No rash.   Neurological:      General: No focal deficit present.      Mental Status: She is alert and oriented to person, place, and time.   Psychiatric:         Mood and Affect: Mood normal.         Significant Labs:  All pertinent labs from the last 24 hours have been reviewed.    Significant Diagnostics:  I have reviewed all pertinent imaging results/findings within the past 24 hours.

## 2020-08-24 NOTE — PROGRESS NOTES
[facility]  Vascular Surgery  Progress Note    Patient Name: Suyapa Connelly  MRN: [id number]  Admission Date: 8/18/2020  Primary Care Provider: Erlinda Rahman NP    Subjective:     Interval History: Ambulating better.    Post-Op Info:  Procedure(s) (LRB):  CREATION, BYPASS, ARTERIAL, FEMORAL TO POPLITEAL, USING GRAFT, LEFT LOWER EXTREMITY (Left)   6 Days Post-Op       Medications:  Continuous Infusions:  Scheduled Meds:   amiodarone  400 mg Oral Daily    aspirin  81 mg Oral Daily    carvediloL  6.25 mg Oral Daily    ceFEPime (MAXIPIME) IVPB  2 g Intravenous Q12H    clopidogreL  75 mg Oral Daily    heparin (porcine)  5,000 Units Subcutaneous Q8H    hydrALAZINE  25 mg Oral Q8H    vancomycin (VANCOCIN) IVPB  1,250 mg Intravenous Q24H     PRN Meds:acetaminophen, dextrose 50%, dextrose 50%, glucagon (human recombinant), glucose, glucose, hydrALAZINE, HYDROcodone-acetaminophen, HYDROmorphone, insulin aspart U-100, labetalol, morphine, ondansetron, ondansetron, oxyCODONE-acetaminophen     Objective:     Vital Signs (Most Recent):  Temp: 98.1 °F (36.7 °C) (08/24/20 1214)  Pulse: 89 (08/24/20 1214)  Resp: 16 (08/24/20 1214)  BP: 129/69 (08/24/20 1214)  SpO2: 100 % (08/24/20 1214) Vital Signs (24h Range):  Temp:  [97.9 °F (36.6 °C)-98.7 °F (37.1 °C)] 98.1 °F (36.7 °C)  Pulse:  [84-89] 89  Resp:  [16-20] 16  SpO2:  [97 %-100 %] 100 %  BP: (129-166)/(55-74) 129/69         Physical Exam  Vitals signs reviewed.   Constitutional:       General: She is not in acute distress.     Appearance: She is well-developed. She is not diaphoretic.   HENT:      Head: Normocephalic and atraumatic.   Eyes:      Conjunctiva/sclera: Conjunctivae normal.   Neck:      Musculoskeletal: Neck supple.   Cardiovascular:      Rate and Rhythm: Normal rate.      Pulses:           Femoral pulses are 2+ on the right side and 2+ on the left side.       Popliteal pulses are 2+ on the right side and 2+ on the left side.         Dorsalis pedis pulses are detected w/ Doppler on the right side and 2+ on the left side.        Posterior tibial pulses are detected w/ Doppler on the right side and detected w/ Doppler on the left side.   Pulmonary:      Effort: Pulmonary effort is normal.   Abdominal:      General: There is no distension.      Palpations: Abdomen is soft. There is no mass.      Tenderness: There is no abdominal tenderness. There is no guarding or rebound.      Hernia: No hernia is present.   Musculoskeletal: Normal range of motion.         General: No deformity.   Feet:      Left foot:      Skin integrity: Ulcer and skin breakdown present. No erythema.   Skin:     General: Skin is warm and dry.      Findings: No rash.      Comments: L groin and knee inc healing well with +staples in place without infection   Neurological:      General: No focal deficit present.      Mental Status: She is alert and oriented to person, place, and time.   Psychiatric:         Mood and Affect: Mood normal.         Significant Labs:  All pertinent labs from the last 24 hours have been reviewed.    Significant Diagnostics:  I have reviewed all pertinent imaging results/findings within the past 24 hours.    Assessment/Plan:     * S/P femoral-popliteal bypass surgery  -per above plan.   -cont to keep groin clean and dry    Critical lower limb ischemia  -s/p L femoral - BK popliteal prosthetic bypass 8/18/20 - recovering well.   -ADA diet  -Cont PT, OT ordered - cont following recs  -Pain control prn  -Continue pertinent home meds   -DVT ppx    Stage 3 chronic kidney disease  -cont home meds and volume optimization    Acute osteomyelitis of left calcaneus  -cont ID rec Abx    Non healing left heel wound  -cont wound care and offloading    Coronary artery disease involving native coronary artery of native heart without angina pectoris  -cont ASA and Plavix  -cont home meds    Dilated cardiomyopathy  -cont home meds  -cont ASA and Plavix    Mixed  hyperlipidemia  -cont home meds    Chronic combined systolic and diastolic heart failure  -cont home meds    Essential hypertension  -cont home meds    Type 2 diabetes mellitus without complication, with long-term current use of insulin  -cont sliding scale insulin    PAD (peripheral artery disease)  -cont ASA        Teddy Huber MD  Vascular Surgery  [facility]

## 2020-08-24 NOTE — TELEPHONE ENCOUNTER
"----- Message from Jaimee Torres sent at 8/24/2020 12:39 PM CDT -----  Contact: "[facility] 3rd floor nurse" [phone]  Type: Patient Call Back    Who called: "[facility] 3rd floor nurse"    What is the request in detail: calling to get an order for a walker for the patient    Can the clinic reply by [facility]? Call back    Would the patient rather a call back or a response via [facility]? Call back    Best call back number: [phone]      "

## 2020-08-24 NOTE — PLAN OF CARE
08/24/20 1235   Final Note   Assessment Type Final Discharge Note   Anticipated Discharge Disposition Home   What phone number can be called within the next 1-3 days to see how you are doing after discharge? [phone]   Hospital Follow Up  Appt(s) scheduled? Yes   Discharge plans and expectations educations in teach back method with documentation complete? Yes   Right Care Referral Info   Post Acute Recommendation IRF  (Pt declined rehab.)   Post-Acute Status   Post-Acute Authorization Other  (Home with outpatient PT/OT)   Discharge Delays None known at this time

## 2020-08-24 NOTE — ASSESSMENT & PLAN NOTE
-s/p L femoral - BK popliteal prosthetic bypass 8/18/20 - recovering well.   -ADA diet  -Cont PT, OT ordered - cont following recs  -Pain control prn  -Continue pertinent home meds   -DVT ppx

## 2020-08-24 NOTE — NURSING
Patient awaiting  states will use borrowed walker until her come.Surgery shoe intact. Denies pain at this time.. Reviewed follow up appointment  And EMS. When to call for help. Refuse iv abx prior to leaving.Awaiting walker to be delivery for social service

## 2020-08-24 NOTE — PLAN OF CARE
Problem: Occupational Therapy Goal  Goal: Occupational Therapy Goal  Description: Goals to be met by: 09/05/20     Patient will increase functional independence with ADLs by performing:    LE Dressing with Supervision.  Grooming while standing at sink with Supervision.  Toileting from toilet with Supervision for hygiene and clothing management.   Supine to sit with Supervision. MET 8/24  Step transfer with Supervision MET 8/24  Toilet transfer to toilet with Supervision. MET 8/24  Upper extremity exercise program x15 reps per handout, with independence.    Outcome: Ongoing, Progressing   Pt progressing- replaced pts L post op shoe today. Pt declined IPR, discharging home today with OP therapy.

## 2020-08-24 NOTE — PLAN OF CARE
"   08/24/20 0843   Post-Acute Status   Post-Acute Authorization Other  (Outpatient PT/OT)   Post-Acute Placement Status Discharge Plan Changed   Other Status Community Services   Discharge Delays None known at this time   Discharge Plan   Discharge Plan A Home with family;Other  (Outpatient PT/OT and Wound care)     SW spoke with pt this morning to discuss d/c planning. According to pt, she would like to d/c home with outpatient PT/OT and wound care. Message sent to MD for outpatient orders on PT/OT and wound care. EDUARD waiting response.     SW stressed the benefits of rehab, however, pt has declined rehab on several occasions and stand firm she would like to d/c home. '    EDUCATION:  Pt provided with educational information on Post Op.  Information reviewed and placed in :My Healthcare Packet" to be brought home for  use as resource after discharge.  Information included:  signs and symptoms to look for at discharge. EDUARD instructed pt to call the doctor if experiencing symptoms that may indicate a medical emergency: CALL 911 if signs and symptoms worsen. SW asked pt to provide SW with two signs and symptoms recently discussed.  Pt stated, "fever, drainage, and infection". Reminded pt things she will be responsible for to manage her healthcare at home: getting Rx filled, attending follow up appointments, and taking medication as prescribed were discussed.   Teach back method used.  All questions answered.  Patient verbalized understanding of all information.      EDUARD provided pt with a copy of follow-up appointments. SW explained/highlighted date, time, and location of each appointment. EDUARD provided pt with a blue folder and instructed pt to place all medical documents in blue folder. EDUARD explained to pt the nurse will provide an AVS with diagnosis and instructed pt to place in blue folder and bring to follow-up appointment.     According to Jennifer with [facility], pt does not qualify for RW due to recent w/c.    "

## 2020-08-24 NOTE — SUBJECTIVE & OBJECTIVE
Medications:  Continuous Infusions:  Scheduled Meds:   amiodarone  400 mg Oral Daily    aspirin  81 mg Oral Daily    carvediloL  6.25 mg Oral Daily    ceFEPime (MAXIPIME) IVPB  2 g Intravenous Q12H    clopidogreL  75 mg Oral Daily    heparin (porcine)  5,000 Units Subcutaneous Q8H    hydrALAZINE  25 mg Oral Q8H    vancomycin (VANCOCIN) IVPB  1,250 mg Intravenous Q24H     PRN Meds:acetaminophen, dextrose 50%, dextrose 50%, glucagon (human recombinant), glucose, glucose, hydrALAZINE, HYDROcodone-acetaminophen, HYDROmorphone, insulin aspart U-100, labetalol, morphine, ondansetron, ondansetron, oxyCODONE-acetaminophen     Objective:     Vital Signs (Most Recent):  Temp: 98.1 °F (36.7 °C) (08/24/20 1214)  Pulse: 89 (08/24/20 1214)  Resp: 16 (08/24/20 1214)  BP: 129/69 (08/24/20 1214)  SpO2: 100 % (08/24/20 1214) Vital Signs (24h Range):  Temp:  [97.9 °F (36.6 °C)-98.7 °F (37.1 °C)] 98.1 °F (36.7 °C)  Pulse:  [84-89] 89  Resp:  [16-20] 16  SpO2:  [97 %-100 %] 100 %  BP: (129-166)/(55-74) 129/69         Physical Exam  Vitals signs reviewed.   Constitutional:       General: She is not in acute distress.     Appearance: She is well-developed. She is not diaphoretic.   HENT:      Head: Normocephalic and atraumatic.   Eyes:      Conjunctiva/sclera: Conjunctivae normal.   Neck:      Musculoskeletal: Neck supple.   Cardiovascular:      Rate and Rhythm: Normal rate.      Pulses:           Femoral pulses are 2+ on the right side and 2+ on the left side.       Popliteal pulses are 2+ on the right side and 2+ on the left side.        Dorsalis pedis pulses are detected w/ Doppler on the right side and 2+ on the left side.        Posterior tibial pulses are detected w/ Doppler on the right side and detected w/ Doppler on the left side.   Pulmonary:      Effort: Pulmonary effort is normal.   Abdominal:      General: There is no distension.      Palpations: Abdomen is soft. There is no mass.      Tenderness: There is no  abdominal tenderness. There is no guarding or rebound.      Hernia: No hernia is present.   Musculoskeletal: Normal range of motion.         General: No deformity.   Feet:      Left foot:      Skin integrity: Ulcer and skin breakdown present. No erythema.   Skin:     General: Skin is warm and dry.      Findings: No rash.      Comments: L groin and knee inc healing well with +staples in place without infection   Neurological:      General: No focal deficit present.      Mental Status: She is alert and oriented to person, place, and time.   Psychiatric:         Mood and Affect: Mood normal.         Significant Labs:  All pertinent labs from the last 24 hours have been reviewed.    Significant Diagnostics:  I have reviewed all pertinent imaging results/findings within the past 24 hours.   5

## 2020-08-24 NOTE — NURSING
Informed Patient that since she has rent wheelchair she does qualify for insurance to pay for walker. She states that  borrowed one for her and she will use that walker and doesn't want to return wheelchair at this time. No complains of pain

## 2020-08-24 NOTE — NURSING
Wound care done to left heel, no odor, scant serous drainage and patient voiced no complaint of pain or discomfort.  Orthopedic boot order will receive tomorrow.

## 2020-08-24 NOTE — PROGRESS NOTES
TN taught Symptoms and Problems for POST OP home care with pt and   with teach back:  1. Temp of 100.5 or higher, 2.  Smelly drainage, 3. Don't rub legs. TN placed education sheet in [employer]..     Help at home will be from spouse, Randell, assisting in pt's recovery.    Preference is for Chuck at [facility] and [place]     TN taught patient about things SHE is responsible for when discharged TO HELP WITH HER RECOVERY:  Particularly on how to Manage HER Care At Home:  1. Getting her prescriptions filled.  2. Taking her medications as directed. DO NOT MISS ANY DOSES!  3. Going to her follow-up doctor appointments.   .  Amira Baker, RN, BSN, STN CCM

## 2020-08-24 NOTE — PROGRESS NOTES
[facility]  Vascular Surgery  Progress Note    Patient Name: Suyapa Connelly  MRN: [id number]  Admission Date: 8/18/2020  Primary Care Provider: Erlinda Rahman NP    Subjective:     Interval History: No complaints.    Post-Op Info:  Procedure(s) (LRB):  CREATION, BYPASS, ARTERIAL, FEMORAL TO POPLITEAL, USING GRAFT, LEFT LOWER EXTREMITY (Left)   6 Days Post-Op       Medications:  Continuous Infusions:  Scheduled Meds:   amiodarone  400 mg Oral Daily    aspirin  81 mg Oral Daily    carvediloL  6.25 mg Oral Daily    ceFEPime (MAXIPIME) IVPB  2 g Intravenous Q12H    clopidogreL  75 mg Oral Daily    heparin (porcine)  5,000 Units Subcutaneous Q8H    hydrALAZINE  25 mg Oral Q8H    vancomycin (VANCOCIN) IVPB  1,250 mg Intravenous Q24H     PRN Meds:acetaminophen, dextrose 50%, dextrose 50%, glucagon (human recombinant), glucose, glucose, hydrALAZINE, HYDROcodone-acetaminophen, HYDROmorphone, insulin aspart U-100, labetalol, morphine, ondansetron, ondansetron, oxyCODONE-acetaminophen     Objective:     Vital Signs (Most Recent):  Temp: 98.3 °F (36.8 °C) (08/24/20 0756)  Pulse: 89 (08/24/20 0756)  Resp: 18 (08/24/20 0756)  BP: 138/63 (08/24/20 0756)  SpO2: 99 % (08/24/20 0756) Vital Signs (24h Range):  Temp:  [97.9 °F (36.6 °C)-98.7 °F (37.1 °C)] 98.3 °F (36.8 °C)  Pulse:  [72-89] 89  Resp:  [17-20] 18  SpO2:  [97 %-99 %] 99 %  BP: (114-166)/(55-74) 138/63         Physical Exam  Vitals signs reviewed.   Constitutional:       General: She is not in acute distress.     Appearance: She is well-developed. She is not diaphoretic.   HENT:      Head: Normocephalic and atraumatic.   Eyes:      Conjunctiva/sclera: Conjunctivae normal.   Neck:      Musculoskeletal: Neck supple.   Cardiovascular:      Rate and Rhythm: Normal rate.      Pulses:           Femoral pulses are 2+ on the right side and 2+ on the left side.       Popliteal pulses are 2+ on the right side and 2+ on the left side.        Dorsalis  pedis pulses are detected w/ Doppler on the right side and 2+ on the left side.        Posterior tibial pulses are detected w/ Doppler on the right side and detected w/ Doppler on the left side.   Pulmonary:      Effort: Pulmonary effort is normal.   Abdominal:      General: There is no distension.      Palpations: Abdomen is soft. There is no mass.      Tenderness: There is no abdominal tenderness. There is no guarding or rebound.      Hernia: No hernia is present.   Musculoskeletal: Normal range of motion.         General: No deformity.   Feet:      Left foot:      Skin integrity: Ulcer and skin breakdown present. No erythema.   Skin:     General: Skin is warm and dry.      Findings: No rash.   Neurological:      General: No focal deficit present.      Mental Status: She is alert and oriented to person, place, and time.   Psychiatric:         Mood and Affect: Mood normal.         Significant Labs:  All pertinent labs from the last 24 hours have been reviewed.    Significant Diagnostics:  I have reviewed all pertinent imaging results/findings within the past 24 hours.    Assessment/Plan:     * S/P femoral-popliteal bypass surgery  -per above plan.   -cont to keep groin clean and dry    Critical lower limb ischemia  -s/p L femoral - BK popliteal prosthetic bypass 8/18/20 - recovering well.   -ADA diet  -Cont PT, OT ordered - rec rehab; Case management working on placement  -Pain control prn  -Continue pertinent home meds   -DVT ppx    Stage 3 chronic kidney disease  -cont home meds and volume optimization    Acute osteomyelitis of left calcaneus  -cont ID rec Abx    Non healing left heel wound  -cont wound care and offloading    Coronary artery disease involving native coronary artery of native heart without angina pectoris  -cont ASA and Plavix  -cont home meds    Dilated cardiomyopathy  -cont home meds  -cont ASA and Plavix    Mixed hyperlipidemia  -cont home meds    Chronic combined systolic and diastolic heart  failure  -cont home meds    Essential hypertension  -cont home meds    Type 2 diabetes mellitus without complication, with long-term current use of insulin  -cont sliding scale insulin    PAD (peripheral artery disease)  -cont ASA        Teddy Huber MD  Vascular Surgery  [facility]

## 2020-08-25 ENCOUNTER — PATIENT OUTREACH (OUTPATIENT)
Dept: ADMINISTRATIVE | Facility: CLINIC | Age: 55
End: 2020-08-25

## 2020-08-25 DIAGNOSIS — I99.9 VASCULAR ABNORMALITY: Primary | ICD-10-CM

## 2020-08-25 NOTE — PT/OT/SLP DISCHARGE
Physical Therapy Discharge Summary    Name: Suyapa Connelly  MRN: [id number]   Principal Problem: S/P femoral-popliteal bypass surgery     Patient Discharged from acute Physical Therapy on 20.  Please refer to prior PT noted date on 20 for functional status.     Assessment:     Patient was discharged unexpectedly.  Information required to complete an accurate discharge summary is unknown.  Refer to therapy initial evaluation and last progress note for initial and most recent functional status and goal achievement.  Recommendations made may be found in medical record.    Objective:     GOALS:   Multidisciplinary Problems     Physical Therapy Goals        Problem: Physical Therapy Goal    Goal Priority Disciplines Outcome Goal Variances Interventions   Physical Therapy Goal     PT, PT/OT Ongoing, Progressing     Description: Goals to be met by: 2020     Patient will increase functional independence with mobility by performin. Supine to sit with Modified [place]  2. Bed to chair transfer with Modified [place]   3. Gait  x 25' feet with Contact Guard Assistance using Rolling Walker.   4. Wheelchair propulsion x150 feet with Modified [place] using bilateral uppper extremities  5. Ascend/descend 5 stair with left Handrails Minimal Assistance   6. Lower extremity exercise program x10 reps per handout, with supervision                     Reasons for Discontinuation of Therapy Services  Transfer to alternate level of care.      Plan:     Patient Discharged to: Outpatient Therapy Services.    Veena Montesinos, PT  2020

## 2020-08-26 ENCOUNTER — HOSPITAL ENCOUNTER (OUTPATIENT)
Dept: WOUND CARE | Facility: HOSPITAL | Age: 55
Discharge: HOME | End: 2020-08-26
Attending: FAMILY MEDICINE
Payer: MEDICAID

## 2020-08-26 VITALS
DIASTOLIC BLOOD PRESSURE: 56 MMHG | TEMPERATURE: 98 F | SYSTOLIC BLOOD PRESSURE: 115 MMHG | RESPIRATION RATE: 20 BRPM | HEART RATE: 84 BPM

## 2020-08-26 DIAGNOSIS — L97.426 DIABETIC ULCER OF LEFT HEEL ASSOCIATED WITH TYPE 2 DIABETES MELLITUS, WITH BONE INVOLVEMENT WITHOUT EVIDENCE OF NECROSIS: ICD-10-CM

## 2020-08-26 DIAGNOSIS — E11.621 DIABETIC ULCER OF LEFT HEEL ASSOCIATED WITH TYPE 2 DIABETES MELLITUS, WITH BONE INVOLVEMENT WITHOUT EVIDENCE OF NECROSIS: ICD-10-CM

## 2020-08-26 DIAGNOSIS — M86.172 ACUTE OSTEOMYELITIS OF LEFT CALCANEUS: Primary | ICD-10-CM

## 2020-08-26 PROCEDURE — 99215 PR OFFICE/OUTPT VISIT, EST, LEVL V, 40-54 MIN: ICD-10-PCS | Mod: 25,,, | Performed by: FAMILY MEDICINE

## 2020-08-26 PROCEDURE — 11042 DBRDMT SUBQ TIS 1ST 20SQCM/<: CPT | Performed by: FAMILY MEDICINE

## 2020-08-26 PROCEDURE — 11042 DBRDMT SUBQ TIS 1ST 20SQCM/<: CPT | Mod: ,,, | Performed by: FAMILY MEDICINE

## 2020-08-26 PROCEDURE — 99215 OFFICE O/P EST HI 40 MIN: CPT | Mod: 25,,, | Performed by: FAMILY MEDICINE

## 2020-08-26 PROCEDURE — 11042 DEBRIDEMENT: ICD-10-PCS | Mod: ,,, | Performed by: FAMILY MEDICINE

## 2020-08-26 PROCEDURE — 27201912 HC WOUND CARE DEBRIDEMENT SUPPLIES: Performed by: FAMILY MEDICINE

## 2020-08-26 NOTE — PROGRESS NOTES
[facility]                Progress Note    Subjective:       Patient ID: Suyapa Connelly is a 54 y.o. female.    Chief Complaint: Wound Check    F/u wound care visit. Patient in w/c but easily transfers to stretcher. Noted staples intact to left medial leg and wound dressing to left heel intact. Moderate amount of serosanguinous drainage noted with eschar noted to wound bed. Patient will see MD Mayes for plans of surgical debridement. Tcom done this appt, pt tolerated well. Wound care done per order. RTC on 9/4/2020.    This is an established patient in wound care.   Patient presents in the office today for evaluation of the chronic wound.  Updated HPI is noted below.    The periwound appears non-erythematous, no maceration noted, edematous    The wound appears swollen, surrounding tenderness    Patient denies fever, chills    Patients reports wound pain    Lymphedema status is contributory to wound status    Pain at the site of the wound is aching    Contributing factors to current wound state include Diabetes Type 2, off-loading limitations, Lymphedema, PAD, Patient had recent revasculization by Dr. Huber       HBO consideration - pt has diabetic foot ulcer, left heel  -  hall grade  4 on 7/18/2020   Bone culture positive positive for Enterocuccus Faecalis, Streptococcus Agalactiae (Group B) and Enterobacter Cloacae  Patient started on IV zosyn via picc line, followed by PO augmentin x 2 weeks - (Patient followed by ID)    7/20/2020 ATTILA - left 2.13 and right 2.13  Revasculization with Dr. Huber on 8/18/2020 - Left common femoral artery to below knee popliteal bypass with 6mm propaten graft     8/26/2020 -  TCOM done -  which show good vascular access - Patient showed positive response to oxygen therapy      8/26/2020 - Nutrition status assessed - pre-albumin - low  - pt has been advised of importance of protein intake - recommended increase protein intake    8/26/2020 -  Diabetes assess - a1c - has decreased from 14 to 10.2 in the past month. Diabetes remains uncontrolled, however, patient has been working on her blood glucose, with recent POCT glucose of 183. Patient advised of importance of blood glucose control on effects of wound care.      off-loading - pt has been off-loading at home with football dressing and darco.      pt would benefit from HBO therapy. Patient has not had resolution of wound despite greater than 30 days of standard wound care with optimization of vascular status, nutrition, glycemic control, off-loading, infection management and debridement.         Review of Systems   Constitutional: Negative for activity change, appetite change and fever.   HENT: Negative for congestion.    Respiratory: Negative for shortness of breath and wheezing.    Cardiovascular: Negative for chest pain and leg swelling.   Gastrointestinal: Negative for abdominal pain, nausea and vomiting.   Skin: Positive for wound.   Neurological: Negative for dizziness and headaches.   Psychiatric/Behavioral: The patient is not nervous/anxious.          Objective:        Physical Exam  Vitals signs and nursing note reviewed.   Constitutional:       General: She is not in acute distress.     Appearance: She is well-developed.   HENT:      Head: Normocephalic and atraumatic.   Eyes:      Conjunctiva/sclera: Conjunctivae normal.   Neck:      Musculoskeletal: Normal range of motion.   Pulmonary:      Effort: Pulmonary effort is normal.   Abdominal:      General: There is no distension.   Musculoskeletal: Normal range of motion.   Skin:     General: Skin is warm.      Comments: See wound description   Neurological:      Mental Status: She is alert and oriented to person, place, and time.   Psychiatric:         Behavior: Behavior normal.         Thought Content: Thought content normal.         Judgment: Judgment normal.           Vitals:    08/26/20 0939   BP: (!) 115/56   Pulse: 84    Resp: 20   Temp: 97.7 °F (36.5 °C)       Assessment:           ICD-10-CM ICD-9-CM   1. Acute osteomyelitis of left calcaneus  M86.172 730.07   2. Diabetic ulcer of left heel associated with type 2 diabetes mellitus, with bone involvement without evidence of necrosis  E11.621 250.80    L97.426 707.14            Wound 08/07/20 1342 Diabetic Ulcer Left Heel (Active)   08/07/20 1342    Pre-existing: Yes   Primary Wound Type: Diabetic ulc   Side: Left   Orientation:    Location: Heel   Wound Number (optional):    Ankle-Brachial Index:    Pulses:    Removal Indication and Assessment:    Wound Outcome:    (Retired) Wound Type:    (Retired) Wound Length (cm):    (Retired) Wound Width (cm):    (Retired) Depth (cm):    Wound Description (Comments):    Removal Indications:    Wound Image    08/26/20 0900   Wound WDL ex 08/26/20 0900   Dressing Appearance Intact;Moist drainage 08/26/20 0900   Drainage Amount Large 08/26/20 0900   Drainage Characteristics/Odor Serosanguineous 08/26/20 0900   Appearance Pink;Red;Yellow;Eschar;Slough 08/26/20 0900   Tissue loss description Full thickness 08/26/20 0900   Black (%), Wound Tissue Color 75 % 08/26/20 0900   Red (%), Wound Tissue Color 5 % 08/26/20 0900   Yellow (%), Wound Tissue Color 20 % 08/26/20 0900   Periwound Area Dry 08/26/20 0900   Wound Edges Open 08/26/20 0900   Wound Length (cm) 7.8 cm 08/26/20 0900   Wound Width (cm) 6 cm 08/26/20 0900   Wound Depth (cm) 0.3 cm 08/26/20 0900   Wound Volume (cm^3) 14.04 cm^3 08/26/20 0900   Wound Surface Area (cm^2) 46.8 cm^2 08/26/20 0900   Care Cleansed with:;Sterile normal saline;Soap and water 08/26/20 0900   Dressing Applied;Cast padding 08/26/20 0900   Off Loading Football dressing 08/26/20 0900   Dressing Change Due 09/04/20 08/26/20 0900           Debridement    Date/Time: 8/26/2020 8:57 PM  Performed by: Colleen Cortes MD  Authorized by: Colleen Cortes MD     Consent Done?:  Yes (Verbal)  Local anesthesia used?: Yes    Local  anesthetic:  Topical anesthetic    Wound Details:    Location:  Left foot    Location:  Left Heel    Type of Debridement:  Excisional       Length (cm):  7.8       Area (sq cm):  46.8       Width (cm):  6       Percent Debrided (%):  40       Depth (cm):  0.3       Total Area Debrided (sq cm):  18.72    Depth of debridement:  Subcutaneous tissue    Tissue debrided:  Subcutaneous    Devitalized tissue debrided:  Slough and Fibrin    Instruments:  Curette    Bleeding:  None  Patient tolerance:  Patient tolerated the procedure well with no immediate complications      Plan:            1. Debridement needed and Done today.   Patient would benefit from HBO therapy. Patient has not had resolution of wound despite greater than 30 days of standard wound care with optimization of vascular status, nutrition, glycemic control, off-loading, infection management and debridement.   Order 30 HBO treatment at 2.0 bernice x 120 minutes   Continue f/u with Dr. Mayes for deeper debridement of wound   Continue f/u with Dr. Huber - Patient had recent revascularization   Continue to make improvement in diet to improve blood glucose and protein intake   Keep dressing clean and intact  Continue with wound care orders and plan as noted in orders.   Continue to follow current medication regimen as per pcp   Call for any questions / concerns.         Orders Placed This Encounter   Procedures    Debridement     This order was created via procedure documentation     Standing Status:   Standing     Number of Occurrences:   1    Hemoglobin A1C     Standing Status:   Future     Number of Occurrences:   1     Standing Expiration Date:   8/26/2021    Prealbumin     Standing Status:   Future     Number of Occurrences:   1     Standing Expiration Date:   10/25/2021    Change dressing     Left heel: Clean wound with NS. Apply Iodoflex to wound bed, cover with mextra. Football drsg (cast padding x3, stockinet ONLY).        Greater than 45 minutes was  spent with this patient with greater than 50% spent with face-to-face counseling    Follow up in about 9 days (around 9/4/2020) for wound care.

## 2020-08-27 ENCOUNTER — TELEPHONE (OUTPATIENT)
Dept: VASCULAR SURGERY | Facility: CLINIC | Age: 55
End: 2020-08-27

## 2020-08-27 NOTE — TELEPHONE ENCOUNTER
Call to patient and she stated she was up on her leg yesterday and it was swollen today. Explained to elevate it and she asked about placing ice on it. Explained would talk to Dr. Huber. Once confirmed it was fine with Dr. Huber notified patient.

## 2020-08-27 NOTE — TELEPHONE ENCOUNTER
----- Message from Yony Lora sent at 8/27/2020 12:55 PM CDT -----  Patient called to speak w/ someone regarding her experiencing post-op complications, she states she has swelling to left leg, requesting callback [phone]

## 2020-08-28 ENCOUNTER — OFFICE VISIT (OUTPATIENT)
Dept: INFECTIOUS DISEASES | Facility: CLINIC | Age: 55
End: 2020-08-28
Payer: MEDICAID

## 2020-08-28 VITALS
TEMPERATURE: 99 F | BODY MASS INDEX: 34.49 KG/M2 | DIASTOLIC BLOOD PRESSURE: 59 MMHG | HEART RATE: 70 BPM | SYSTOLIC BLOOD PRESSURE: 109 MMHG | WEIGHT: 207.25 LBS

## 2020-08-28 DIAGNOSIS — I73.9 PERIPHERAL VASCULAR DISEASE: ICD-10-CM

## 2020-08-28 DIAGNOSIS — M86.172 ACUTE OSTEOMYELITIS OF LEFT CALCANEUS: Primary | ICD-10-CM

## 2020-08-28 DIAGNOSIS — Z71.85 VACCINE COUNSELING: ICD-10-CM

## 2020-08-28 DIAGNOSIS — N18.30 STAGE 3 CHRONIC KIDNEY DISEASE: ICD-10-CM

## 2020-08-28 DIAGNOSIS — Z78.9 HEALTH CARE HOME, ACTIVE CARE COORDINATION: ICD-10-CM

## 2020-08-28 PROCEDURE — 99999 PR PBB SHADOW E&M-EST. PATIENT-LVL IV: ICD-10-PCS | Mod: PBBFAC,,, | Performed by: INTERNAL MEDICINE

## 2020-08-28 PROCEDURE — 99999 PR PBB SHADOW E&M-EST. PATIENT-LVL IV: CPT | Mod: PBBFAC,,, | Performed by: INTERNAL MEDICINE

## 2020-08-28 PROCEDURE — 99213 OFFICE O/P EST LOW 20 MIN: CPT | Mod: S$PBB,,, | Performed by: INTERNAL MEDICINE

## 2020-08-28 PROCEDURE — 99213 PR OFFICE/OUTPT VISIT, EST, LEVL III, 20-29 MIN: ICD-10-PCS | Mod: S$PBB,,, | Performed by: INTERNAL MEDICINE

## 2020-08-28 PROCEDURE — 99214 OFFICE O/P EST MOD 30 MIN: CPT | Mod: PBBFAC | Performed by: INTERNAL MEDICINE

## 2020-08-28 RX ORDER — METOPROLOL SUCCINATE 50 MG/1
TABLET, EXTENDED RELEASE ORAL
COMMUNITY
Start: 2020-08-20 | End: 2021-01-21

## 2020-08-28 NOTE — PROGRESS NOTES
INFECTIOUS DISEASE CLINIC  08/28/2020     Subjective:      Chief Complaint: hospital follow up     History of Present Illness:    Patient Suyapa Connelly is a 54 y.o. female with h/o CAD/PAD and chronic L heel wound S/p CREATION, BYPASS, ARTERIAL, FEMORAL TO POPLITEAL, USING GRAFT, LEFT LOWER EXTREMITY (Left) with Dr Huber on 8/18. Infectious disease had been following patient outpatient for IV antibiotics for osteomyelitis of L foot based on positive bone cultures growing e.faecalis, group B strep and enterobacter. Note path with viable bone without inflammatory infiltrates or other significant histopathologic abnormalities. She had some clinical improvement on zosyn, but abx stopped after 4 weeks 2/2 acute kidney injury (zosyn/torosamide combo?). acute kidney injury improved with stopping zosyn. Pt received vanc/cefepime while admitted inpatient for reperfusion and then was discharged on po augmentin per ID recs.     Reports she is feeling better, denies drainage from heal wound. Seeing podiatry and vascular for follow up. Intermittently having some leg swelling, worse at end of day. Still has lance from surgery, wound healing. Denies fever. Reports compliance with augmentin, almost done.      Ref Range & Units 2d ago 1mo ago 7mo ago   Hemoglobin A1C 4.0 - 5.6 % 10.2High   >14.0High  CM  9.7High  CM      Creatinine 0.5 - 1.4 mg/dL 1.5High   1.5High   1.4              Review of Symptoms:  Constitutional: Denies fevers, chills, or weakness.  ENT: Denies dysphagia, nasal discharge, ear pain or discharge.  Cardiovascular: Denies chest pain, palpitations, orthopnea, or claudication.  Respiratory: Denies shortness of breath, cough, hemoptysis, or wheezing.  GI: Denies nausea/vomitting, hematochezia, melena, abd pain, or changes in appetite.  : Denies dysuria, incontinence, or hematuria.  Musculoskeletal: Denies joint pain or myalgias.  Skin/breast: Denies rashes, lumps, lesions, or discharge.  Neurologic: Denies  headache, dizziness, vertigo, or paresthesias.    Past Medical History:   Diagnosis Date    Anxiety     Depression     Diabetes mellitus     type 2    Diabetes mellitus, type 2     GERD (gastroesophageal reflux disease)     Hyperlipemia     Hypertension     Myocardial infarction 2010    minor-caused by stress per pt.       Past Surgical History:   Procedure Laterality Date    Angiogram - Right Extremity Right 7/9/15    angiogram-left leg  10/6/15    CATHETERIZATION OF BOTH LEFT AND RIGHT HEART N/A 12/18/2019    Procedure: CATHETERIZATION, HEART, BOTH LEFT AND RIGHT;  Surgeon: Que Fernando III, MD;  Location: [facility] CATH LAB;  Service: Cardiology;  Laterality: N/A;    CORONARY ANGIOGRAPHY N/A 12/18/2019    Procedure: ANGIOGRAM, CORONARY ARTERY;  Surgeon: Que Fernando III, MD;  Location: [facility] CATH LAB;  Service: Cardiology;  Laterality: N/A;    CORONARY ANGIOGRAPHY INCLUDING BYPASS GRAFTS WITH CATHETERIZATION OF LEFT HEART N/A 7/28/2020    Procedure: ANGIOGRAM, CORONARY, INCLUDING BYPASS GRAFT, WITH LEFT HEART CATHETERIZATION, 9 am;  Surgeon: Rachel Easley MD;  Location: [facility] CATH LAB;  Service: Cardiology;  Laterality: N/A;    CORONARY ARTERY BYPASS GRAFT (CABG) N/A 1/14/2020    Procedure: CORONARY ARTERY BYPASS GRAFT (CABG) x 1     Off Pump;  Surgeon: Huang Altamirano MD;  Location: [address];  Service: Cardiovascular;  Laterality: N/A;    CREATION OF FEMOROPOPLITEAL ARTERIAL BYPASS USING GRAFT Left 8/18/2020    Procedure: CREATION, BYPASS, ARTERIAL, FEMORAL TO POPLITEAL, USING GRAFT, LEFT LOWER EXTREMITY;  Surgeon: Teddy Huber MD;  Location: [facility];  Service: Vascular;  Laterality: Left;  REQUEST 7:15 A.M. START----COVID NEGATIVE ON 8/17  1ST CASE STARTKENYA PER LEANA ON 8/7/2020 @ 942AM-LO  RN PREOP 8/12/2020   T/S-----CLEARED BY CARDS-------PENDING INSURANCE    INSERTION OF TUNNELED CENTRAL VENOUS HEMODIALYSIS CATHETER N/A 1/27/2020    Procedure: Insertion, Catheter,  Central Venous, Hemodialysis;  Surgeon: ESTEBAN Gomez III, MD;  Location: [facility] CATH LAB;  Service: Peripheral Vascular;  Laterality: N/A;       Family History   Problem Relation Age of Onset    Anesthesia problems Neg Hx        Social History     Socioeconomic History    Marital status: Legally      Spouse name: Not on file    Number of children: Not on file    Years of education: Not on file    Highest education level: Not on file   Occupational History    Not on file   Social Needs    Financial resource strain: Not on file    Food insecurity     Worry: Not on file     Inability: Not on file    Transportation needs     Medical: Not on file     Non-medical: Not on file   Tobacco Use    Smoking status: Never Smoker    Smokeless tobacco: Never Used   Substance and Sexual Activity    Alcohol use: No    Drug use: Yes     Types: Marijuana     Comment: used yesterday    Sexual activity: Not on file   Lifestyle    Physical activity     Days per week: Not on file     Minutes per session: Not on file    Stress: Not on file   Relationships    Social connections     Talks on phone: Not on file     Gets together: Not on file     Attends [employer] witness service: Not on file     Active member of club or organization: Not on file     Attends meetings of clubs or organizations: Not on file     Relationship status: Not on file   Other Topics Concern    Not on file   Social History Narrative    Not on file       Review of patient's allergies indicates:   Allergen Reactions    Ciprofloxacin      Itchiness    Contrast media      Kidney injury         Objective:   VS (24h):   Vitals:    08/28/20 1333   BP: (!) 109/59   Pulse: 70       Temp: 98.9 °F (37.2 °C)   BP (!) 109/59 (BP Location: Right arm, Patient Position: Sitting)   Pulse 70   Temp 98.9 °F (37.2 °C) (Oral)   Wt 94 kg (207 lb 3.7 oz) Comment: unable to stand  LMP 09/30/2015 (Exact Date)   BMI 34.49 kg/m²     [unfilled]  General: Afebrile,  alert, comfortable, no acute distress.   HEENT: KYLIE. EOMI, no scleral icterus.   Pulmonary: Non labored,clear to auscultation A/P/L.   Cardiac: normal S1 & S2 w/o rubs/murmurs/gallops.   Abdominal: Non-tender, non-distended.  Extremities: Moves all extremities x 4. 2+ edema LLE >RLE. Palpable pulses  Skin: see photo  Neurological:  Alert and oriented x 4.             Labs:    Glucose   Date Value Ref Range Status   08/24/2020 161 (H) 70 - 110 mg/dL Final   08/23/2020 167 (H) 70 - 110 mg/dL Final   08/22/2020 164 (H) 70 - 110 mg/dL Final       Calcium   Date Value Ref Range Status   08/24/2020 8.5 (L) 8.7 - 10.5 mg/dL Final   08/23/2020 8.3 (L) 8.7 - 10.5 mg/dL Final   08/22/2020 8.7 8.7 - 10.5 mg/dL Final       Albumin   Date Value Ref Range Status   08/12/2020 2.9 (L) 3.5 - 5.2 g/dL Final   07/29/2020 2.3 (L) 3.5 - 5.2 g/dL Final   07/22/2020 2.1 (L) 3.5 - 5.2 g/dL Final       Total Protein   Date Value Ref Range Status   08/12/2020 8.9 (H) 6.0 - 8.4 g/dL Final   07/17/2020 8.5 (H) 6.0 - 8.4 g/dL Final   03/23/2020 7.2 6.0 - 8.4 g/dL Final       Sodium   Date Value Ref Range Status   08/24/2020 138 136 - 145 mmol/L Final   08/23/2020 137 136 - 145 mmol/L Final   08/22/2020 137 136 - 145 mmol/L Final       Potassium   Date Value Ref Range Status   08/24/2020 3.4 (L) 3.5 - 5.1 mmol/L Final   08/23/2020 3.2 (L) 3.5 - 5.1 mmol/L Final   08/22/2020 3.2 (L) 3.5 - 5.1 mmol/L Final       CO2   Date Value Ref Range Status   08/24/2020 20 (L) 23 - 29 mmol/L Final   08/23/2020 20 (L) 23 - 29 mmol/L Final   08/22/2020 20 (L) 23 - 29 mmol/L Final       Chloride   Date Value Ref Range Status   08/24/2020 107 95 - 110 mmol/L Final   08/23/2020 105 95 - 110 mmol/L Final   08/22/2020 104 95 - 110 mmol/L Final       BUN, Bld   Date Value Ref Range Status   08/24/2020 18 6 - 20 mg/dL Final   08/23/2020 21 (H) 6 - 20 mg/dL Final   08/22/2020 24 (H) 6 - 20 mg/dL Final       Creatinine   Date Value Ref Range Status   08/24/2020 1.5  (H) 0.5 - 1.4 mg/dL Final   08/23/2020 1.5 (H) 0.5 - 1.4 mg/dL Final   08/22/2020 1.4 0.5 - 1.4 mg/dL Final       Alkaline Phosphatase   Date Value Ref Range Status   08/12/2020 114 55 - 135 U/L Final   07/17/2020 115 55 - 135 U/L Final   03/23/2020 62 55 - 135 U/L Final       ALT   Date Value Ref Range Status   08/12/2020 10 10 - 44 U/L Final   07/17/2020 <5 (L) 10 - 44 U/L Final   03/23/2020 16 10 - 44 U/L Final       AST   Date Value Ref Range Status   08/12/2020 16 10 - 40 U/L Final   07/17/2020 11 10 - 40 U/L Final   03/23/2020 23 10 - 40 U/L Final       Total Bilirubin   Date Value Ref Range Status   08/12/2020 0.3 0.1 - 1.0 mg/dL Final     Comment:     For infants and newborns, interpretation of results should be based  on gestational age, weight and in agreement with clinical  observations.  Premature Infant recommended reference ranges:  Up to 24 hours.............<8.0 mg/dL  Up to 48 hours............<12.0 mg/dL  3-5 days..................<15.0 mg/dL  6-29 days.................<15.0 mg/dL     07/17/2020 0.5 0.1 - 1.0 mg/dL Final     Comment:     For infants and newborns, interpretation of results should be based  on gestational age, weight and in agreement with clinical  observations.  Premature Infant recommended reference ranges:  Up to 24 hours.............<8.0 mg/dL  Up to 48 hours............<12.0 mg/dL  3-5 days..................<15.0 mg/dL  6-29 days.................<15.0 mg/dL     03/23/2020 0.4 0.1 - 1.0 mg/dL Final     Comment:     For infants and newborns, interpretation of results should be based  on gestational age, weight and in agreement with clinical  observations.  Premature Infant recommended reference ranges:  Up to 24 hours.............<8.0 mg/dL  Up to 48 hours............<12.0 mg/dL  3-5 days..................<15.0 mg/dL  6-29 days.................<15.0 mg/dL         WBC   Date Value Ref Range Status   08/24/2020 8.09 3.90 - 12.70 K/uL Final   08/23/2020 7.33 3.90 - 12.70 K/uL Final    08/22/2020 8.35 3.90 - 12.70 K/uL Final       Hemoglobin   Date Value Ref Range Status   08/24/2020 7.4 (L) 12.0 - 16.0 g/dL Final   08/23/2020 7.4 (L) 12.0 - 16.0 g/dL Final   08/22/2020 7.2 (L) 12.0 - 16.0 g/dL Final       POC Hematocrit   Date Value Ref Range Status   01/21/2020 23 (L) 36 - 54 %PCV Final   01/20/2020 24 (L) 36 - 54 %PCV Final   01/19/2020 21 (L) 36 - 54 %PCV Final     Hematocrit   Date Value Ref Range Status   08/24/2020 23.5 (L) 37.0 - 48.5 % Final   08/23/2020 23.8 (L) 37.0 - 48.5 % Final   08/22/2020 23.1 (L) 37.0 - 48.5 % Final       Mean Corpuscular Volume   Date Value Ref Range Status   08/24/2020 100 (H) 82 - 98 fL Final   08/23/2020 100 (H) 82 - 98 fL Final   08/22/2020 100 (H) 82 - 98 fL Final       Platelets   Date Value Ref Range Status   08/24/2020 399 (H) 150 - 350 K/uL Final   08/23/2020 379 (H) 150 - 350 K/uL Final   08/22/2020 338 150 - 350 K/uL Final       Lab Results   Component Value Date    CHOL 246 (H) 01/03/2020    CHOL 244 (H) 12/16/2019       Lab Results   Component Value Date    HDL 41 01/03/2020    HDL 46 12/16/2019       Lab Results   Component Value Date    LDLCALC 182.0 (H) 01/03/2020    LDLCALC 171.0 (H) 12/16/2019       Lab Results   Component Value Date    TRIG 115 01/03/2020    TRIG 135 12/16/2019       Lab Results   Component Value Date    CHOLHDL 16.7 (L) 01/03/2020    CHOLHDL 18.9 (L) 12/16/2019     No results found for: RPR  No results found for: QUANTIFERON    Medications:  Current Outpatient Medications on File Prior to Visit   Medication Sig Dispense Refill    acetaminophen (TYLENOL) 500 MG tablet Take 1,000 mg by mouth daily as needed for Pain.      allopurinoL (ZYLOPRIM) 100 MG tablet Take 1 tablet (100 mg total) by mouth once daily. 30 tablet 5    amiodarone (PACERONE) 400 MG tablet Take 1 tablet (400 mg total) by mouth once daily. 30 tablet 11    amoxicillin-clavulanate 500-125mg (AUGMENTIN) 500-125 mg Tab Take 1 tablet (500 mg total) by mouth 2  (two) times daily. for 16 days 32 tablet 0    aspirin 81 MG Chew Take 1 tablet (81 mg total) by mouth once daily. 90 tablet 3    atorvastatin (LIPITOR) 80 MG tablet Take 1 tablet (80 mg total) by mouth once daily. 30 tablet 5    blood sugar diagnostic (ONETOUCH ULTRA BLUE TEST STRIP) Strp Use to test blood glucose twice daily (Patient taking differently: Tests 3 times daily) 100 each 11    carvediloL (COREG) 6.25 MG tablet Take 6.25 mg by mouth once daily. Pt thinks she take BID. Unsure.      clopidogreL (PLAVIX) 75 mg tablet Take 1 tablet (75 mg total) by mouth once daily. 30 tablet 11    escitalopram oxalate (LEXAPRO) 10 MG tablet Take 1 tablet (10 mg total) by mouth once daily. 30 tablet 5    hydrALAZINE (APRESOLINE) 25 MG tablet Take 1 tablet (25 mg total) by mouth every 8 (eight) hours. 90 tablet 5    insulin aspart U-100 (NOVOLOG) 100 unit/mL injection Inject 9 Units into the skin 3 (three) times daily with meals. 10 mL 5    lancets 30 gauge Misc use to test blood glucose 2 (two) times daily with meals. 100 each 11    metoprolol succinate (TOPROL-XL) 50 MG 24 hr tablet       multivitamin (THERAGRAN) per tablet Take 1 tablet by mouth once daily.      ondansetron (ZOFRAN-ODT) 4 MG TbDL Take 1 tablet (4 mg total) by mouth every 12 (twelve) hours as needed (vomiting). 10 tablet 0    oxyCODONE-acetaminophen (PERCOCET) 5-325 mg per tablet Take 1 tablet by mouth every 6 (six) hours as needed for Pain. 28 tablet 0    oxyCODONE-acetaminophen (PERCOCET) 5-325 mg per tablet Take 1 tablet by mouth every 6 (six) hours as needed for Pain. 25 tablet 0    torsemide (DEMADEX) 20 MG Tab Take 20 mg by mouth once daily.       gabapentin (NEURONTIN) 300 MG capsule Take 1 capsule (300 mg total) by mouth 3 (three) times daily. 90 capsule 5    insulin glargine 100 units/mL (3mL) SubQ pen Inject 10 Units into the skin every evening. (Patient taking differently: Inject 10 Units into the skin every evening. Lantus) 9 mL  1    piperacillin sodium/tazobactam (PIPERACILLIN-TAZOBACTAM 4.5G/100ML SODIUM CHLORIDE 0.9%-READY TO MIX) Inject 100 mLs (4.5 g total) into the vein every 8 (eight) hours. (Patient not taking: Reported on 8/25/2020)      sevelamer carbonate (RENVELA) 800 mg Tab Take 1 tablet (800 mg total) by mouth 3 (three) times daily with meals. (Patient not taking: Reported on 8/25/2020) 90 tablet 11    [DISCONTINUED] lancing device Misc 1 Device by Misc.(Non-Drug; Combo Route) route 2 (two) times daily with meals. 1 each 0     No current facility-administered medications on file prior to visit.        Antibiotics:   Antibiotics (From admission, onward)    None          HIV: No components found for: HIV 1/2 AG/AB  Hepatitis C IgG: No components found for: HEPATITIS C  Syphilis: No results found for: RPR    Hepatitis A IgG: No components found for: HEPATITIS A IGG  Hepatitis Bc IgG: No components found for: HEPATITIS B CORE IGG  Hepatitis Bs IgG:  Quantiferon: No results found for: QUANTIFERON  VZV IgG: No components found for: VARICELLA IGG    No components found for: SEDIMENTATION RATE  No components found for: C-REACTIVE PROTEIN      Microbiology x 7d:   Microbiology Results (last 7 days)     ** No results found for the last 168 hours. **            There is no immunization history on file for this patient.      Assessment:     54F with h/o CAD/PAD and chronic L heel wound admitted for reperfusion of L leg. S/p CREATION, BYPASS, ARTERIAL, FEMORAL TO POPLITEAL, USING GRAFT, LEFT LOWER EXTREMITY (Left) with Dr Huber on 8/18. Infectious disease had been following patient outpatient for IV antibiotics for osteomyelitis of L foot based on positive bone cultures growing e.faecalis, group B strep and enterobacter. Note path with viable bone without inflammatory infiltrates or other significant histopathologic abnormalities. She had some clinical improvement on zosyn, but abx stopped early 2/2 acute kidney injury  (zosyn/torosamide combo?). Fortunately, acute kidney injury continues to improved with stopping zosyn. Has now received 4 week zosyn, 1 week vanc/cefepime (while inpatient) and then almost two weeks Augmentin. Wound healing, especially since reperfusion    Plan:     Stop antibiotics     Continue podiatry and vascular f/u as scheduled    Need better glucose control    shingrex vaccine    rtc prn      Lindsay De Anda MD, MPH  Infectious Disease

## 2020-08-31 NOTE — DISCHARGE SUMMARY
Discharge summary    Date of admit:  08/18/2020  Date of discharge:  08/24/2020    Admission diagnosis:  1. Atherosclerosis of the left lower extremity with left heel wound with necrosis of underlying bone and muscle  2. HTN  3. HLD  4. DM  5. Obesity   6.  History of tobacco use, cigarettes  7. CKD stage 3  8. Acute osteomyelitis left calcaneus  9. CAD  10. Dilated cardiomyopathy  11. Systolic and diastolic heart failure    Discharge diagnosis:  1. Same    Procedure performed:   1. Left common femoral artery to below knee popliteal bypass with 6mm Propaten graft    HPI:  Suyapa Connelly is a 54 y.o. female with   Patient Active Problem List   Diagnosis    Gangrene of toe    ROSLYN (acute kidney injury)    Peripheral artery disease    Right foot pain    Peripheral vascular disease    PAD (peripheral artery disease)    Hypokalemia, gastrointestinal losses    Acute encephalopathy    Type 2 diabetes mellitus without complication, with long-term current use of insulin    Panic attack as reaction to stress    Malnutrition of moderate degree    Essential hypertension    Vitamin D deficiency    CAROLIN (generalized anxiety disorder)    MDD (major depressive disorder), recurrent episode, moderate    Bereavement    Injury to kidney    Dehydration    Acute cystitis with hematuria    Syncope    Non-intractable vomiting with nausea    Hypertensive urgency    Shortness of breath    Chronic combined systolic and diastolic heart failure    Mixed hyperlipidemia    Dilated cardiomyopathy    Acute coronary syndrome    Acute on chronic combined systolic (congestive) and diastolic (congestive) heart failure    Type 2 diabetes mellitus with hyperglycemia, with long-term current use of insulin    Coronary artery disease involving native coronary artery of native heart without angina pectoris    Contrast dye induced nephropathy    Weakness    Fluid overload    S/P CABG x 1    Acute blood loss anemia     Paroxysmal atrial fibrillation    Alteration in skin integrity in adult    Preventive measure    Ischemic cardiomyopathy    Non healing left heel wound    Acute osteomyelitis of left calcaneus    Stage 3 chronic kidney disease    Critical lower limb ischemia    being managed by PCP and specialists who is here today for evaluation of left heel wound.  Patient states location is left heel occurring for several months.  She had a coronary bypass in January 2020 and was discharged with a small wound on her heel.  She states that progressed in the interim.  Associated signs and symptoms include discoloration and skin breakdown with minimal pain.  Quality is dull and severity is 3/10.  Symptoms began few months ago.  Alleviating factors include wound care and offloading.  Worsening factors include pressure.     2015 s/p R pop angioplasty, L SFA PTA, L pop stent 5x100 BMS, R pop angioplasty, R pop stent 5x100     yes MI  no Stroke  Tobacco use: no     8/2020:  No new issues.  Receiving local wound care and PO Abx.    Brief Hospital Course:  Patient was admitted preoperatively on 08/18/2020 for the above-listed surgery.  She was seen by anesthesia preoperatively was deemed stable for surgery.  Taken the operating room [id number] for the above-listed procedure.  The patient tolerated procedure well and there were no complications.  Please see separately dictated op report for further details of the surgery.  Postoperative she was extubated and transferred to the recovery room and subsequently to the ICU for continued care.  Her pain was controlled.  On postop day 1 her Kerns catheter was removed and she made adequate urine output on without difficulty.  She had some nausea and vomiting on postop day 0 for which she remained NPO on IV fluids.  This resolved with medications as needed and she was started on a clear liquid diabetic diet without further issues.  Her diet was progressed to a diabetic diet on postop day 1.  She  worked with physical therapy and occupational therapy and the recommendations were followed.  Her home medications were resumed and she continued to get wound care to her incision sites.  She had a palpable pulse to her left foot.  She continued to progress and on postop day 3 the patient was in stable condition discharged from the hospital to skilled nursing facility although case management was continue to work on her placement.  By postop day 6 the patient has been approved for home health physical and occupational therapy and the patient was in stable condition be discharged from the hospital.    Discharge condition:  Stable    Disposition:  Home with home health physical and occupational therapy    Discharge instructions:  The patient was instructed to call vascular surgery Clinic in question concerns, intractable nausea or vomiting, fevers greater than 101.5 Dr. over-the-counter pain medication and severe pain, edema purulence noted from her incisions.    Discharge medications:  The patient was instructed the all medications as prescribed.  She was given discharge by medications:  Percocet 5 mg 1 tab p.o. Q 6 hr p.r.n. pain    Follow-up:  The patient will follow up with vascular surgery Clinic in 4 weeks for staple removal and wound check with arterial studies

## 2020-09-02 ENCOUNTER — CLINICAL SUPPORT (OUTPATIENT)
Dept: REHABILITATION | Facility: HOSPITAL | Age: 55
End: 2020-09-02
Payer: MEDICAID

## 2020-09-02 DIAGNOSIS — I99.8 ISCHEMIA OF LOWER EXTREMITY: ICD-10-CM

## 2020-09-02 DIAGNOSIS — I99.9 VASCULAR ABNORMALITY: ICD-10-CM

## 2020-09-02 PROCEDURE — 97110 THERAPEUTIC EXERCISES: CPT

## 2020-09-02 PROCEDURE — 97162 PT EVAL MOD COMPLEX 30 MIN: CPT

## 2020-09-02 NOTE — PLAN OF CARE
[facility]  Physical Therapy Initial Evaluation    Name: Suyapa Connelly  Clinic Number: [id number]    Therapy Diagnosis:   Encounter Diagnoses   Name Primary?    Vascular abnormality     Ischemia of lower extremity      Physician: Erlinda Rahman NP    Physician Orders: PT Eval and Treat  Medical Diagnosis:   Vascular abnormality   Ischemia of lower extremity     Evaluation Date: 9/2/2020  Authorization Period Expiration: 8/24/2021  Plan of Care Certification Period: 11/25/2020  Visit # / Visits authorized: 1/1  Date of Surgery: 8/18/2020  Precautions: Standard, Diabetes and Weightbearing    Time In: 140p  Time Out: 225p  Total Billable Time: 45 minutes    Subjective     Date of onset: Over time since 1/1/2020  History of current condition - Suyapa reports: she had a heart attack in Jan 2020 and after CABG was released to home in Feb at which time she had a blister on her left heel.  She was not able to care for the blister as well as needed due to COVID and household limitations.  She then had a stent placed in June and femoral-popliteal artery grafting on 8/18/2020.  The blister has become a wound that is now believed to have proper blood supply for sufficient healing.  She is currently only TTWB on the affected leg.       Past Medical History:   Diagnosis Date    Anxiety     Depression     Diabetes mellitus     type 2    Diabetes mellitus, type 2     GERD (gastroesophageal reflux disease)     Hyperlipemia     Hypertension     Myocardial infarction 2010    minor-caused by stress per pt.     Suyapa Connelly  has a past surgical history that includes Angiogram - Right Extremity (Right, 7/9/15); angiogram-left leg (10/6/15); Catheterization of both left and right heart (N/A, 12/18/2019); Coronary angiography (N/A, 12/18/2019); Coronary Artery Bypass Graft (CABG) (N/A, 1/14/2020); Insertion of tunneled central venous hemodialysis catheter (N/A, 1/27/2020); Coronary angiography  including bypass grafts with catheterization of left heart (N/A, 7/28/2020); and Creation of femoropopliteal arterial bypass using graft (Left, 8/18/2020).    Suyapa has a current medication list which includes the following prescription(s): acetaminophen, allopurinol, amiodarone, aspirin, atorvastatin, blood sugar diagnostic, carvedilol, clopidogrel, escitalopram oxalate, gabapentin, hydralazine, insulin aspart u-100, insulin, lancets, metoprolol succinate, multivitamin, ondansetron, oxycodone-acetaminophen, oxycodone-acetaminophen, piperacillin sodium/tazobactam, sevelamer carbonate, torsemide, and lancing device.    Review of patient's allergies indicates:   Allergen Reactions    Ciprofloxacin      Itchiness    Contrast media      Kidney injury        Prior Therapy: Yes  Social History: Lives at home with , mom, stepfather   Occupation: unable to work  Prior Level of Function: No limitation  Current Level of Function: Wheelchair with limited weightbearing    Pain:  Current 2/10, worst 8/10, best 1/10   Location: left feet   Description: Aching, Dull and Deep    Pts goals: To resume normal self-care and daily activity    Objective     Range of Motion:   Ankle Right (Active/Passive) Left (Active/Passive)   Dorsiflexion 10 degrees Unable to test secondary to dressing   Plantarflexion 37 degrees Unable to test secondary to dressing      Inversion 20 degrees Unable to test secondary to dressing      Eversion 4 degrees Unable to test secondary to dressing         Strength:  Ankle Right Left   Dorsiflexion 5/5 Unable to test secondary to dressing      Plantarflexion 5/5 Unable to test secondary to dressing      Inversion 5/5 Unable to test secondary to dressing      Eversion 5/5 Unable to test secondary to dressing        TREATMENT     Treatment Time In: 155p  Treatment Time Out: 225p  Total Treatment time separate from Evaluation time:30 minutes    Suyapa received therapeutic exercises to develop strength and  ROM for 30 minutes including:  Seated marching - 2 x 15  LAQ - 2 x 8  Seated clamshell - 2 x 15, orange  Seated HR - 2 x 10  Seated trunk rotation - 2 x 10  Education in diagnosis and plan of care    Suyapa received the following manual therapy techniques for 0 minutes, including:    Home Exercises and Patient Education Provided    Education provided re: therapeutic diagnosis and plan of care    Written Home Exercises Provided: Yes  Exercises were reviewed and Suyapa was able to demonstrate them prior to the end of the session.   Pt received a written copy of exercises to perform at home. Suyapa demonstrated good  understanding of the education provided.     See EMR under patient instructions for exercises given.     Assessment     Suyapa is a 54 y.o. 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 Pt presents with objective deficits in ability to bear weight on affected leg, left LE edema and atrophy that limits functional ability to stand, walk, transfer, stairs.    Pt prognosis is Fair.   Pt will benefit from skilled outpatient Physical Therapy to address the deficits stated above and in the chart below, provide pt/family education, and to maximize pt's level of independence.     Plan of care discussed with patient: Yes  Pt's spiritual, cultural and educational needs considered and patient is agreeable to the plan of care and goals as stated below:     Anticipated Barriers for therapy: Co-morbidities    Medical Necessity is demonstrated by the following  History  Co-morbidities and personal factors that may impact the plan of care Co-morbidities:   See chart    Personal Factors:   no deficits     high   Examination  Body Structures and Functions, activity limitations and participation restrictions that may impact the plan of care Body Regions:   lower extremities    Body Systems:     ROM  strength  balance  gait  transfers    Participation Restrictions:   None    Activity limitations:   Learning and applying knowledge  no deficits    General Tasks and Commands  no deficits    Communication  no deficits    Mobility  walking  using transportation (bus, train, plane, car)  driving (bike, car, motorcycle)    Self care  no deficits    Domestic Life  no deficits    Interactions/Relationships  no deficits    Life Areas  no deficits    Community and Social Life  no deficits         moderate   Clinical Presentation evolving clinical presentation with changing clinical characteristics moderate   Decision Making/ Complexity Score: moderate       Goals:  Short Term Goals: 4 weeks   1. The patient will demonstrate 5 degrees of affected ankle dorsiflexion to increase ease with standing.  2. The patient will demonstrate 4/5 strength of affected ankle plantarflexion and dorsiflexion to increase ease with standing and walking.  3. The patient will demonstrate 5 seconds of eyes open single leg stance to increase ease with balance and safety.     Long Term Goals: 12 weeks   1. The patient will demonstrate 5/5 strength of affected ankle plantarflexion to increase ease with stairs.  2. The patient will demonstrate the ability to sit to stand without discomfort.  3. The patient will return to walking in community without limitation.    Plan   Certification Period/Plan of care expiration: 9/2/2020 to 11/25/2020.    Outpatient Physical Therapy 1 times bi-weekly for 12 weeks to include the following interventions: manual therapy as needed, therapeutic exercises to address functional deficits, modalities prn, wound care prn, dry needling prn, treatment by a skilled PTA at times.    Stas Arnold, PT

## 2020-09-02 NOTE — PROGRESS NOTES
[facility]  Physical Therapy Initial Evaluation    Name: Suyapa Connelly  Clinic Number: [id number]    Therapy Diagnosis:   Encounter Diagnoses   Name Primary?    Vascular abnormality     Ischemia of lower extremity      Physician: Erlinda Rahman NP    Physician Orders: PT Eval and Treat  Medical Diagnosis:   Vascular abnormality   Ischemia of lower extremity     Evaluation Date: 9/2/2020  Authorization Period Expiration: 8/24/2021  Plan of Care Certification Period: 11/25/2020  Visit # / Visits authorized: 1/1  Date of Surgery: 8/18/2020  Precautions: Standard, Diabetes and Weightbearing    Time In: 140p  Time Out: 225p  Total Billable Time: 45 minutes    Subjective     Date of onset: Over time since 1/1/2020  History of current condition - Suyapa reports: she had a heart attack in Jan 2020 and after CABG was released to home in Feb at which time she had a blister on her left heel.  She was not able to care for the blister as well as needed due to COVID and household limitations.  She then had a stent placed in June and femoral-popliteal artery grafting on 8/18/2020.  The blister has become a wound that is now believed to have proper blood supply for sufficient healing.  She is currently only TTWB on the affected leg.       Past Medical History:   Diagnosis Date    Anxiety     Depression     Diabetes mellitus     type 2    Diabetes mellitus, type 2     GERD (gastroesophageal reflux disease)     Hyperlipemia     Hypertension     Myocardial infarction 2010    minor-caused by stress per pt.     Suyapa Connelly  has a past surgical history that includes Angiogram - Right Extremity (Right, 7/9/15); angiogram-left leg (10/6/15); Catheterization of both left and right heart (N/A, 12/18/2019); Coronary angiography (N/A, 12/18/2019); Coronary Artery Bypass Graft (CABG) (N/A, 1/14/2020); Insertion of tunneled central venous hemodialysis catheter (N/A, 1/27/2020); Coronary angiography  including bypass grafts with catheterization of left heart (N/A, 7/28/2020); and Creation of femoropopliteal arterial bypass using graft (Left, 8/18/2020).    Suyapa has a current medication list which includes the following prescription(s): acetaminophen, allopurinol, amiodarone, aspirin, atorvastatin, blood sugar diagnostic, carvedilol, clopidogrel, escitalopram oxalate, gabapentin, hydralazine, insulin aspart u-100, insulin, lancets, metoprolol succinate, multivitamin, ondansetron, oxycodone-acetaminophen, oxycodone-acetaminophen, piperacillin sodium/tazobactam, sevelamer carbonate, torsemide, and lancing device.    Review of patient's allergies indicates:   Allergen Reactions    Ciprofloxacin      Itchiness    Contrast media      Kidney injury        Prior Therapy: Yes  Social History: Lives at home with , mom, stepfather   Occupation: unable to work  Prior Level of Function: No limitation  Current Level of Function: Wheelchair with limited weightbearing    Pain:  Current 2/10, worst 8/10, best 1/10   Location: left feet   Description: Aching, Dull and Deep    Pts goals: To resume normal self-care and daily activity    Objective     Range of Motion:   Ankle Right (Active/Passive) Left (Active/Passive)   Dorsiflexion 10 degrees Unable to test secondary to dressing   Plantarflexion 37 degrees Unable to test secondary to dressing      Inversion 20 degrees Unable to test secondary to dressing      Eversion 4 degrees Unable to test secondary to dressing         Strength:  Ankle Right Left   Dorsiflexion 5/5 Unable to test secondary to dressing      Plantarflexion 5/5 Unable to test secondary to dressing      Inversion 5/5 Unable to test secondary to dressing      Eversion 5/5 Unable to test secondary to dressing        TREATMENT     Treatment Time In: 155p  Treatment Time Out: 225p  Total Treatment time separate from Evaluation time:30 minutes    Suyapa received therapeutic exercises to develop strength and  ROM for 30 minutes including:  Seated marching - 2 x 15  LAQ - 2 x 8  Seated clamshell - 2 x 15, orange  Seated HR - 2 x 10  Seated trunk rotation - 2 x 10  Education in diagnosis and plan of care    Suyapa received the following manual therapy techniques for 0 minutes, including:    Home Exercises and Patient Education Provided    Education provided re: therapeutic diagnosis and plan of care    Written Home Exercises Provided: Yes  Exercises were reviewed and Suyapa was able to demonstrate them prior to the end of the session.   Pt received a written copy of exercises to perform at home. Suyapa demonstrated good  understanding of the education provided.     See EMR under patient instructions for exercises given.     Assessment     Suyapa is a 54 y.o. 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 Pt presents with objective deficits in ability to bear weight on affected leg, left LE edema and atrophy that limits functional ability to stand, walk, transfer, stairs.    Pt prognosis is Fair.   Pt will benefit from skilled outpatient Physical Therapy to address the deficits stated above and in the chart below, provide pt/family education, and to maximize pt's level of independence.     Plan of care discussed with patient: Yes  Pt's spiritual, cultural and educational needs considered and patient is agreeable to the plan of care and goals as stated below:     Anticipated Barriers for therapy: Co-morbidities    Medical Necessity is demonstrated by the following  History  Co-morbidities and personal factors that may impact the plan of care Co-morbidities:   See chart    Personal Factors:   no deficits     high   Examination  Body Structures and Functions, activity limitations and participation restrictions that may impact the plan of care Body Regions:   lower extremities    Body Systems:     ROM  strength  balance  gait  transfers    Participation Restrictions:   None    Activity limitations:   Learning and applying knowledge  no deficits    General Tasks and Commands  no deficits    Communication  no deficits    Mobility  walking  using transportation (bus, train, plane, car)  driving (bike, car, motorcycle)    Self care  no deficits    Domestic Life  no deficits    Interactions/Relationships  no deficits    Life Areas  no deficits    Community and Social Life  no deficits         moderate   Clinical Presentation evolving clinical presentation with changing clinical characteristics moderate   Decision Making/ Complexity Score: moderate       Goals:  Short Term Goals: 4 weeks   1. The patient will demonstrate 5 degrees of affected ankle dorsiflexion to increase ease with standing.  2. The patient will demonstrate 4/5 strength of affected ankle plantarflexion and dorsiflexion to increase ease with standing and walking.  3. The patient will demonstrate 5 seconds of eyes open single leg stance to increase ease with balance and safety.     Long Term Goals: 12 weeks   1. The patient will demonstrate 5/5 strength of affected ankle plantarflexion to increase ease with stairs.  2. The patient will demonstrate the ability to sit to stand without discomfort.  3. The patient will return to walking in community without limitation.    Plan   Certification Period/Plan of care expiration: 9/2/2020 to 11/25/2020.    Outpatient Physical Therapy 1 times bi-weekly for 12 weeks to include the following interventions: manual therapy as needed, therapeutic exercises to address functional deficits, modalities prn, wound care prn, dry needling prn, treatment by a skilled PTA at times.    Stas Arnold, PT

## 2020-09-04 ENCOUNTER — ANESTHESIA EVENT (OUTPATIENT)
Dept: SURGERY | Facility: HOSPITAL | Age: 55
End: 2020-09-04
Payer: MEDICAID

## 2020-09-04 ENCOUNTER — HOSPITAL ENCOUNTER (OUTPATIENT)
Dept: WOUND CARE | Facility: HOSPITAL | Age: 55
Discharge: HOME | End: 2020-09-04
Attending: PODIATRIST
Payer: MEDICAID

## 2020-09-04 ENCOUNTER — TELEPHONE (OUTPATIENT)
Dept: WOUND CARE | Facility: HOSPITAL | Age: 55
End: 2020-09-04

## 2020-09-04 ENCOUNTER — HOSPITAL ENCOUNTER (OUTPATIENT)
Dept: PREADMISSION TESTING | Facility: HOSPITAL | Age: 55
Discharge: HOME | End: 2020-09-04
Attending: PODIATRIST
Payer: MEDICAID

## 2020-09-04 VITALS — HEART RATE: 78 BPM | TEMPERATURE: 98 F | SYSTOLIC BLOOD PRESSURE: 127 MMHG | DIASTOLIC BLOOD PRESSURE: 59 MMHG

## 2020-09-04 VITALS
TEMPERATURE: 98 F | BODY MASS INDEX: 32.65 KG/M2 | HEIGHT: 65 IN | WEIGHT: 196 LBS | DIASTOLIC BLOOD PRESSURE: 86 MMHG | OXYGEN SATURATION: 100 % | SYSTOLIC BLOOD PRESSURE: 160 MMHG | RESPIRATION RATE: 17 BRPM | HEART RATE: 76 BPM

## 2020-09-04 DIAGNOSIS — E11.621 DIABETIC ULCER OF LEFT HEEL ASSOCIATED WITH TYPE 2 DIABETES MELLITUS, WITH BONE INVOLVEMENT WITHOUT EVIDENCE OF NECROSIS: Primary | ICD-10-CM

## 2020-09-04 DIAGNOSIS — L97.426 DIABETIC ULCER OF LEFT HEEL ASSOCIATED WITH TYPE 2 DIABETES MELLITUS, WITH BONE INVOLVEMENT WITHOUT EVIDENCE OF NECROSIS: Primary | ICD-10-CM

## 2020-09-04 PROCEDURE — 11045 DBRDMT SUBQ TISS EACH ADDL: CPT | Mod: ,,, | Performed by: PODIATRIST

## 2020-09-04 PROCEDURE — 11045 WOUND DEBRIDEMENT: ICD-10-PCS | Mod: ,,, | Performed by: PODIATRIST

## 2020-09-04 PROCEDURE — 11042 WOUND DEBRIDEMENT: ICD-10-PCS | Mod: ,,, | Performed by: PODIATRIST

## 2020-09-04 PROCEDURE — 99499 NO LOS: ICD-10-PCS | Mod: ,,, | Performed by: PODIATRIST

## 2020-09-04 PROCEDURE — 27201912 HC WOUND CARE DEBRIDEMENT SUPPLIES: Performed by: PODIATRIST

## 2020-09-04 PROCEDURE — 11042 DBRDMT SUBQ TIS 1ST 20SQCM/<: CPT | Performed by: PODIATRIST

## 2020-09-04 PROCEDURE — 11042 DBRDMT SUBQ TIS 1ST 20SQCM/<: CPT | Mod: ,,, | Performed by: PODIATRIST

## 2020-09-04 PROCEDURE — 99499 UNLISTED E&M SERVICE: CPT | Mod: ,,, | Performed by: PODIATRIST

## 2020-09-04 NOTE — DISCHARGE INSTRUCTIONS
"  Your surgery is scheduled for _Tuesday Sept 8, 2020_at 10:00 am__________________.        Please report to SAME DAY SURGERY UNIT on the 2nd FLOOR at _10:00______ a.m.  Use front door entrance. The doors open at 0530 am.      If you need WHEELCHAIR assistance please call  [phone] from your cell phone or "0"  from the  hospital courtesy phone located to the right after you enter the hospital lobby.      INSTRUCTIONS IMPORTANT!!!  ¨ Do not eat or drink after 12 midnight-including water. OK to brush teeth, no   gum, candy or mints!    ¨ Take only these medicines with a small swallow of water-morning of surgery.  Take med's on list    _x___  Return to [facility] on _Saturday 9-5-2020 _between 9am and 5:00 pm_for Covid test.    _x___  Prep instructions: ENEMA   SHOWER   OTHER  ______________________  __x__  No powder, lotions or creams to your body.  __x__  You may wear only deodorant on the day of surgery.  _x___  Please remove all jewelry, including piercings and leave at home.  _x___  No money or valuables needed. Please leave at home.  You may bring your  cell phone.  ____  Please bring any documents given by your doctor.  _x___  If going home the same day, arrange for a ride home. You will not be able to   drive if Anesthesia was used.  _x___  Wear loose fitting clothing. Allow for dressings, bandages.  ____  Stop Aspirin, Ibuprofen, Motrin and Aleve at least 3-5 days before  surgery, unless otherwise instructed by your doctor, or the nurse.              You MAY use Tylenol/acetaminophen until day of surgery.  _x___  If you take diabetic medication, do not take am of surgery   _x___  Call MD for temperature above 101 degrees.        _x___ Stop taking any Fish Oil supplement or any Vitamins that contain Vitamin   E at least 5 days prior to surgery.          I have read or had read and explained to me, and understand the above information.  Additional comments or instructions:Please call   [phone] if you " have any questions regarding the instructions above.

## 2020-09-04 NOTE — PROGRESS NOTES
[facility]                Progress Note    Subjective:       Patient ID: Suyapa Connelly is a 54 y.o. female.    Chief Complaint: Non-healing Wound Follow Up and Diabetic Foot Ulcer    09/4/2020: Surgery consult. Pt had a CABG done 01/14/2020, stent placed for a blockage 06/21/2020, and fem pop 07/18/2020 staples intact. Pt was in a coma for 11 days in January and woke up with a blister on left heel. Pt stated due to covid-19 and taking care of family members, she could not take care of her left heel. Wound worsened due to decreased blood flow to heel. Pt reported taking Percocet about 12pm today before appointment. Pt stated pain is at 4/10 and decreasing. Swelling to the left foot. No fever. Eschar to left heel. Debridement performed. Discussed elevating/ offloading as much as possible. Pre-op scheduled after wound care appointment. Tentatively plan for OR debridement 9/8/20.       Review of Systems   Constitutional: Negative.    Respiratory: Negative.    Gastrointestinal: Negative.    Genitourinary: Negative.    Musculoskeletal: Positive for myalgias.   Skin: Positive for wound.   Neurological: Negative.    Psychiatric/Behavioral: The patient is not nervous/anxious.          Objective:        Physical Exam  Constitutional:       Appearance: She is well-developed.   Cardiovascular:      Comments: Dorsalis pedis and posterior tibial pulses are diminished Bilaterally. Toes are cool to touch. Feet are warm proximally.There is decreased digital hair. Skin is atrophic, slightly hyperpigmented, and mildly edematous   Pulmonary:      Effort: No respiratory distress.   Musculoskeletal:         General: Tenderness present.      Comments: Adequate joint range of motion without pain, limitation, nor crepitation Bilateral feet and ankle joints. Muscle strength is 5/5 in all groups bilaterally.    Feet:      Right foot:      Skin integrity: Callus and dry skin present. No ulcer or  skin breakdown.      Left foot:      Skin integrity: Dry skin present. No ulcer.   Skin:     General: Skin is dry.      Findings: No erythema.      Comments: See wound description.      Neurological:      Mental Status: She is alert.      Comments: [place] 5.07 monofilamant testing is diminished Tyrell feet. Sharp/dull sensation diminished Bilaterally. Light touch absent Bilaterally.          Vitals:    09/04/20 1338   BP: (!) 127/59   Pulse: 78   Temp: 97.8 °F (36.6 °C)       Assessment:           ICD-10-CM ICD-9-CM   1. Diabetic ulcer of left heel associated with type 2 diabetes mellitus, with bone involvement without evidence of necrosis  E11.621 250.80    L97.426 707.14            Wound 08/07/20 1342 Diabetic Ulcer Left Heel (Active)   08/07/20 1342    Pre-existing: Yes   Primary Wound Type: Diabetic ulc   Side: Left   Orientation:    Location: Heel   Wound Number (optional):    Ankle-Brachial Index:    Pulses:    Removal Indication and Assessment:    Wound Outcome:    (Retired) Wound Type:    (Retired) Wound Length (cm):    (Retired) Wound Width (cm):    (Retired) Depth (cm):    Wound Description (Comments):    Removal Indications:    Wound Image   09/04/20 1300   Wound WDL ex 09/04/20 1300   Dressing Appearance Intact;Moist drainage 09/04/20 1300   Drainage Amount Small 09/04/20 1300   Drainage Characteristics/Odor Serosanguineous 09/04/20 1300   Appearance White;Eschar;Red;Pink 09/04/20 1300   Tissue loss description Full thickness 09/04/20 1300   Black (%), Wound Tissue Color 85 % 09/04/20 1300   Red (%), Wound Tissue Color 5 % 09/04/20 1300   Yellow (%), Wound Tissue Color 10 % 09/04/20 1300   Periwound Area Macerated;Dry 09/04/20 1300   Wound Edges Callused 09/04/20 1300   Wound Length (cm) 4.5 cm 09/04/20 1300   Wound Width (cm) 5.2 cm 09/04/20 1300   Wound Depth (cm) 0.3 cm 09/04/20 1300   Wound Volume (cm^3) 7.02 cm^3 09/04/20 1300   Wound Surface Area (cm^2) 23.4 cm^2 09/04/20 1300   Tunneling  (depth (cm)/location) 0 09/04/20 1300   Undermining (depth (cm)/location) 0 09/04/20 1300   Care Soap and water;Sterile normal saline;Wound cleanser 09/04/20 1300   Dressing Applied;Other (see comments);Foam 09/04/20 1300   Periwound Care Other (see comments) 09/04/20 1300   Off Loading Football dressing 09/04/20 1300           Plan:          Plan for OR debridement left foot case request placed for 9/8/20.     Debridement: See procedure note     Dressings: per above  Offloading:Foam    Follow-up:Patient is to return to the clinic in 1 week  for follow-up but should call [facility] immediately if any signs of infection, such as fever, chills, sweats, increased redness or pain.    Short-term goals include maintaining good offloading and minimizing bioburden, promoting granulation and epithelialization to healing.  Long-term goals include keeping the wound healed by good offloading and medical management under the direction of internist.        Orders Placed This Encounter   Procedures    Change dressing     Gentian violet periwound, iodosorb to wound bad, aquacel foam X1 backed with scott foam short X2, football dressing (cast padding X3, stockinet)    Case Request Operating Room: DEBRIDEMENT, FOOT     Order Specific Question:   Medical Necessity:     Answer:   Medically Non-Urgent [100]     Order Specific Question:   CPT Code:     Answer:   [place] DEBRIDEMENT, SKIN, SUB-Q TISSUE,=<20 SQ CM [37710]     Order Specific Question:   Requested Time     Answer:   12:00 PM     Order Specific Question:   Positioning:     Answer:   Supine [1004]     Order Specific Question:   Is an on-site pathologist required for this procedure?     Answer:   N/A        No follow-ups on file.

## 2020-09-04 NOTE — ANESTHESIA PREPROCEDURE EVALUATION
09/04/2020  Suyapa Connelly is a 54 y.o., female scheduled for DEBRIDEMENT, FOOT (Left Foot on 9/8/2020.     Patient was admitted to the hospital on 7/17 and discharged 7/30/2020 with Osteomyelitis to the L foot.. Vascular Surgery consulted and did an angiogram which revealed adequate artery for access the left SFA and AK popliteal artery occlusion. Patient had an abnormal stress test and went for Angio on 7/28 with stent to Cx. She was cleared to proceed with vascular surgery at low-to-moderate risk of coronary ischemia.  She was admitted on 8/18/2020 for Left common femoral artery to below knee popliteal bypass with 6mm Propaten graft. Patient reports did well with vascular procedure. Denies any new complaints.          Past Medical History:   Diagnosis Date    Anxiety     Depression     Diabetes mellitus     type 2    Diabetes mellitus, type 2     GERD (gastroesophageal reflux disease)     Hyperlipemia     Hypertension     Myocardial infarction 2010    minor-caused by stress per pt.    Vaginal delivery     x1       Past Surgical History:   Procedure Laterality Date    Angiogram - Right Extremity Right 7/9/15    angiogram-left leg  10/6/15    CATHETERIZATION OF BOTH LEFT AND RIGHT HEART N/A 12/18/2019    Procedure: CATHETERIZATION, HEART, BOTH LEFT AND RIGHT;  Surgeon: Que Fernando III, MD;  [facility];  Service: Cardiology;  Laterality: N/A;    CORONARY ANGIOGRAPHY N/A 12/18/2019    Procedure: ANGIOGRAM, CORONARY ARTERY;  Surgeon: Que Fernando III, MD;  [facility];  Service: Cardiology;  Laterality: N/A;    CORONARY ANGIOGRAPHY INCLUDING BYPASS GRAFTS WITH CATHETERIZATION OF LEFT HEART N/A 7/28/2020    Procedure: ANGIOGRAM, CORONARY, INCLUDING BYPASS GRAFT, WITH LEFT HEART CATHETERIZATION, 9 am;  Surgeon: Rachel Easley MD;  Location: [facility] CATH LAB;   Service: Cardiology;  Laterality: N/A;    CORONARY ARTERY BYPASS GRAFT (CABG) N/A 1/14/2020    Procedure: CORONARY ARTERY BYPASS GRAFT (CABG) x 1     Off Pump;  Surgeon: Huang Altamirano MD;  Location: [address];  Service: Cardiovascular;  Laterality: N/A;    CREATION OF FEMOROPOPLITEAL ARTERIAL BYPASS USING GRAFT Left 8/18/2020    Procedure: CREATION, BYPASS, ARTERIAL, FEMORAL TO POPLITEAL, USING GRAFT, LEFT LOWER EXTREMITY;  Surgeon: Teddy Huber MD;  Location: [facility];  Service: Vascular;  Laterality: Left;  REQUEST 7:15 A.M. START----COVID NEGATIVE ON 8/17  1ST CASE STARTE PER LEANA ON 8/7/2020 @ 942AM-LO  RN PREOP 8/12/2020   T/S-----CLEARED BY CARDS-------PENDING INSURANCE    INSERTION OF TUNNELED CENTRAL VENOUS HEMODIALYSIS CATHETER N/A 1/27/2020    Procedure: Insertion, Catheter, Central Venous, Hemodialysis;  Surgeon: ESTEBAN Gomez III, MD;  Location: [facility] CATH LAB;  Service: Peripheral Vascular;  Laterality: N/A;     EKG 7/18/2020:  Normal sinus rhythm   Minimal voltage criteria for LVH, may be normal variant   Prolonged QT   Abnormal ECG   When compared with ECG of 17-JUL-2020 16:24,   No significant change was found   Confirmed by Noel Vu MD ([id number]) on 7/20/2020 2:56:52 PM     Nuclear Stress Test 7/27/2020:  The study shows abnormal myocardial perfusion.    Abnormal myocardial perfusion study.    Perfusion Defect There is a moderate intensity, reversible defect that is consistent with ischemia in the anterior wall(s).    Perfusion Defect There is a second small to moderate sized, moderate intensity, reversible defect that is consistent with ischemia in the distal lateral wall(s).    Perfusion Defect There is a third moderate to large sized, moderate to severe intensity, reversible defect that is consistent with ischemia in the inferior wall(s).    Gated perfusion images showed an ejection fraction of 65%    The EKG portion of this study is negative for ischemia.    The  patient reported no chest pain during the stress test.    There were no arrhythmias during stress.    Anesthesia Evaluation    I have reviewed the Patient Summary Reports.    I have reviewed the Nursing Notes. I have reviewed the NPO Status.   I have reviewed the Medications.     Review of Systems  Anesthesia Hx:  No problems with previous Anesthesia  History of prior surgery of interest to airway management or planning: Denies Family Hx of Anesthesia complications.   Denies Personal Hx of Anesthesia complications.   Social:  Former Smoker    Hematology/Oncology:  Hematology Normal   Oncology Normal     EENT/Dental:EENT/Dental Normal   Cardiovascular:   Hypertension Past MI CAD   CHF    Pulmonary:  Pulmonary Normal    Renal/:   Chronic Renal Disease    Hepatic/GI:   GERD    Neurological:  Neurology Normal    Endocrine:   Diabetes, type 2 Checks sugars 2-3 x day, ranges 130s-180s   Psych:   Psychiatric History          Physical Exam  General:  Well nourished    Airway/Jaw/Neck:  Airway Findings: Mouth Opening: Normal Tongue: Large  General Airway Assessment: Adult  Mallampati: II  Improves to I with phonation.  TM Distance: Normal, at least 6 cm        Eyes/Ears/Nose:  EYES/EARS/NOSE FINDINGS: Normal   Dental:  Dental Findings: In tact    Chest/Lungs:  Chest/Lungs Findings: Normal Respiratory Rate         Mental Status:  Mental Status Findings:  Cooperative, Alert and Oriented         Anesthesia Plan  Type of Anesthesia, risks & benefits discussed:  Anesthesia Type:  MAC  Patient's Preference:   Intra-op Monitoring Plan: standard ASA monitors  Intra-op Monitoring Plan Comments:   Post Op Pain Control Plan: per primary service following discharge from PACU  Post Op Pain Control Plan Comments:   Induction:   IV  Beta Blocker:  Patient is not currently on a Beta-Blocker (No further documentation required).       Informed Consent: Patient understands risks and agrees with Anesthesia plan.  Questions answered.  Anesthesia consent signed with patient.  ASA Score: 3     Day of Surgery Review of History & Physical: I have interviewed and examined the patient. I have reviewed the patient's H&P dated:  There are no significant changes.          Ready For Surgery From Anesthesia Perspective.

## 2020-09-05 ENCOUNTER — CLINICAL SUPPORT (OUTPATIENT)
Dept: URGENT CARE | Facility: CLINIC | Age: 55
End: 2020-09-05
Payer: MEDICAID

## 2020-09-05 DIAGNOSIS — Z01.818 PREOPERATIVE TESTING: ICD-10-CM

## 2020-09-05 PROCEDURE — 99000 SPECIMEN HANDLING OFFICE-LAB: CPT | Mod: S$GLB,,, | Performed by: INTERNAL MEDICINE

## 2020-09-05 PROCEDURE — 99000 PR SPECIMEN HANDLING,DR OFF->LAB: ICD-10-PCS | Mod: S$GLB,,, | Performed by: INTERNAL MEDICINE

## 2020-09-05 PROCEDURE — U0003 INFECTIOUS AGENT DETECTION BY NUCLEIC ACID (DNA OR RNA); SEVERE ACUTE RESPIRATORY SYNDROME CORONAVIRUS 2 (SARS-COV-2) (CORONAVIRUS DISEASE [COVID-19]), AMPLIFIED PROBE TECHNIQUE, MAKING USE OF HIGH THROUGHPUT TECHNOLOGIES AS DESCRIBED BY CMS-2020-01-R: HCPCS

## 2020-09-05 NOTE — PROCEDURES
Wound Debridement    Date/Time: 9/4/2020 3:00 PM  Performed by: Rosio Mayes DPM  Authorized by: Rosio Mayes DPM     Consent Done?:  Yes (Written)    Wound Details:    Location:  Left foot    Location:  Left Heel    Type of Debridement:  Excisional       Length (cm):  4.5       Area (sq cm):  23.4       Width (cm):  5.2       Percent Debrided (%):  100       Depth (cm):  0.3       Total Area Debrided (sq cm):  23.4    Depth of debridement:  Subcutaneous tissue    Tissue debrided:  Dermis    Devitalized tissue debrided:  Fibrin, Exudate, Biofilm and Callus    Instruments:  Curette    Bleeding:  Minimal  Hemostasis Achieved: Yes    Method Used:  Pressure  Patient tolerance:  Patient tolerated the procedure well with no immediate complications

## 2020-09-07 LAB — SARS-COV-2 RNA RESP QL NAA+PROBE: NOT DETECTED

## 2020-09-07 RX ORDER — SODIUM CHLORIDE 9 MG/ML
INJECTION, SOLUTION INTRAVENOUS CONTINUOUS
Status: CANCELLED | OUTPATIENT
Start: 2020-09-07

## 2020-09-07 RX ORDER — LIDOCAINE HYDROCHLORIDE 10 MG/ML
1 INJECTION, SOLUTION EPIDURAL; INFILTRATION; INTRACAUDAL; PERINEURAL ONCE
Status: CANCELLED | OUTPATIENT
Start: 2020-09-07 | End: 2020-09-07

## 2020-09-08 ENCOUNTER — ANESTHESIA (OUTPATIENT)
Dept: SURGERY | Facility: HOSPITAL | Age: 55
End: 2020-09-08
Payer: MEDICAID

## 2020-09-08 ENCOUNTER — TELEPHONE (OUTPATIENT)
Dept: WOUND CARE | Facility: HOSPITAL | Age: 55
End: 2020-09-08

## 2020-09-08 ENCOUNTER — HOSPITAL ENCOUNTER (OUTPATIENT)
Facility: HOSPITAL | Age: 55
Discharge: HOME | End: 2020-09-08
Attending: PODIATRIST | Admitting: PODIATRIST
Payer: MEDICAID

## 2020-09-08 VITALS
SYSTOLIC BLOOD PRESSURE: 145 MMHG | OXYGEN SATURATION: 99 % | DIASTOLIC BLOOD PRESSURE: 68 MMHG | TEMPERATURE: 98 F | RESPIRATION RATE: 16 BRPM | HEART RATE: 70 BPM

## 2020-09-08 DIAGNOSIS — Z01.818 PREOPERATIVE TESTING: ICD-10-CM

## 2020-09-08 DIAGNOSIS — E11.621 DIABETIC ULCER OF LEFT HEEL ASSOCIATED WITH TYPE 2 DIABETES MELLITUS, WITH BONE INVOLVEMENT WITHOUT EVIDENCE OF NECROSIS: Primary | ICD-10-CM

## 2020-09-08 DIAGNOSIS — L97.426 DIABETIC ULCER OF LEFT HEEL ASSOCIATED WITH TYPE 2 DIABETES MELLITUS, WITH BONE INVOLVEMENT WITHOUT EVIDENCE OF NECROSIS: Primary | ICD-10-CM

## 2020-09-08 PROCEDURE — D9220A PRA ANESTHESIA: ICD-10-PCS | Mod: ANES,,, | Performed by: ANESTHESIOLOGY

## 2020-09-08 PROCEDURE — D9220A PRA ANESTHESIA: Mod: CRNA,,, | Performed by: NURSE ANESTHETIST, CERTIFIED REGISTERED

## 2020-09-08 PROCEDURE — 11042 PR DEBRIDEMENT, SKIN, SUB-Q TISSUE,=<20 SQ CM: ICD-10-PCS | Mod: ,,, | Performed by: PODIATRIST

## 2020-09-08 PROCEDURE — 63600175 PHARM REV CODE 636 W HCPCS: Performed by: NURSE ANESTHETIST, CERTIFIED REGISTERED

## 2020-09-08 PROCEDURE — 00400 ANES INTEGUMENTARY SYS NOS: CPT | Performed by: PODIATRIST

## 2020-09-08 PROCEDURE — 71000015 HC POSTOP RECOV 1ST HR: Performed by: PODIATRIST

## 2020-09-08 PROCEDURE — 25000003 PHARM REV CODE 250: Performed by: NURSE ANESTHETIST, CERTIFIED REGISTERED

## 2020-09-08 PROCEDURE — S0020 INJECTION, BUPIVICAINE HYDRO: HCPCS | Performed by: PODIATRIST

## 2020-09-08 PROCEDURE — 71000016 HC POSTOP RECOV ADDL HR: Performed by: PODIATRIST

## 2020-09-08 PROCEDURE — 11042 DBRDMT SUBQ TIS 1ST 20SQCM/<: CPT | Mod: ,,, | Performed by: PODIATRIST

## 2020-09-08 PROCEDURE — 36000706: Performed by: PODIATRIST

## 2020-09-08 PROCEDURE — 25000003 PHARM REV CODE 250: Performed by: PODIATRIST

## 2020-09-08 PROCEDURE — D9220A PRA ANESTHESIA: Mod: ANES,,, | Performed by: ANESTHESIOLOGY

## 2020-09-08 PROCEDURE — 11045 PR DEB SUBQ TISSUE ADD-ON: ICD-10-PCS | Mod: ,,, | Performed by: PODIATRIST

## 2020-09-08 PROCEDURE — D9220A PRA ANESTHESIA: ICD-10-PCS | Mod: CRNA,,, | Performed by: NURSE ANESTHETIST, CERTIFIED REGISTERED

## 2020-09-08 PROCEDURE — 11045 DBRDMT SUBQ TISS EACH ADDL: CPT | Mod: ,,, | Performed by: PODIATRIST

## 2020-09-08 PROCEDURE — 36000707: Performed by: PODIATRIST

## 2020-09-08 PROCEDURE — 37000009 HC ANESTHESIA EA ADD 15 MINS: Performed by: PODIATRIST

## 2020-09-08 PROCEDURE — 37000008 HC ANESTHESIA 1ST 15 MINUTES: Performed by: PODIATRIST

## 2020-09-08 PROCEDURE — 63600175 PHARM REV CODE 636 W HCPCS: Performed by: PODIATRIST

## 2020-09-08 PROCEDURE — 27201423 OPTIME MED/SURG SUP & DEVICES STERILE SUPPLY: Performed by: PODIATRIST

## 2020-09-08 RX ORDER — CEFAZOLIN SODIUM 2 G/50ML
SOLUTION INTRAVENOUS
Status: DISCONTINUED | OUTPATIENT
Start: 2020-09-08 | End: 2020-09-08

## 2020-09-08 RX ORDER — MIDAZOLAM HYDROCHLORIDE 1 MG/ML
INJECTION, SOLUTION INTRAMUSCULAR; INTRAVENOUS
Status: DISCONTINUED | OUTPATIENT
Start: 2020-09-08 | End: 2020-09-08

## 2020-09-08 RX ORDER — BUPIVACAINE HYDROCHLORIDE 2.5 MG/ML
INJECTION, SOLUTION INFILTRATION; PERINEURAL
Status: DISCONTINUED | OUTPATIENT
Start: 2020-09-08 | End: 2020-09-08 | Stop reason: HOSPADM

## 2020-09-08 RX ORDER — SODIUM CHLORIDE 9 MG/ML
INJECTION, SOLUTION INTRAVENOUS CONTINUOUS PRN
Status: DISCONTINUED | OUTPATIENT
Start: 2020-09-08 | End: 2020-09-08

## 2020-09-08 RX ORDER — CEFAZOLIN SODIUM 2 G/50ML
2 SOLUTION INTRAVENOUS
Status: COMPLETED | OUTPATIENT
Start: 2020-09-08 | End: 2020-09-08

## 2020-09-08 RX ORDER — PROPOFOL 10 MG/ML
VIAL (ML) INTRAVENOUS
Status: DISCONTINUED | OUTPATIENT
Start: 2020-09-08 | End: 2020-09-08

## 2020-09-08 RX ORDER — OXYCODONE AND ACETAMINOPHEN 5; 325 MG/1; MG/1
1 TABLET ORAL EVERY 6 HOURS PRN
Qty: 28 TABLET | Refills: 0 | Status: SHIPPED | OUTPATIENT
Start: 2020-09-08 | End: 2020-10-07 | Stop reason: SDUPTHER

## 2020-09-08 RX ORDER — LIDOCAINE HYDROCHLORIDE 20 MG/ML
INJECTION, SOLUTION EPIDURAL; INFILTRATION; INTRACAUDAL; PERINEURAL
Status: DISCONTINUED | OUTPATIENT
Start: 2020-09-08 | End: 2020-09-08

## 2020-09-08 RX ORDER — FENTANYL CITRATE 50 UG/ML
INJECTION, SOLUTION INTRAMUSCULAR; INTRAVENOUS
Status: DISCONTINUED | OUTPATIENT
Start: 2020-09-08 | End: 2020-09-08

## 2020-09-08 RX ORDER — HYDROCODONE BITARTRATE AND ACETAMINOPHEN 5; 325 MG/1; MG/1
1 TABLET ORAL EVERY 4 HOURS PRN
Status: DISCONTINUED | OUTPATIENT
Start: 2020-09-08 | End: 2020-09-08 | Stop reason: HOSPADM

## 2020-09-08 RX ORDER — LIDOCAINE HYDROCHLORIDE 10 MG/ML
INJECTION INFILTRATION; PERINEURAL
Status: DISCONTINUED | OUTPATIENT
Start: 2020-09-08 | End: 2020-09-08 | Stop reason: HOSPADM

## 2020-09-08 RX ADMIN — PROPOFOL 40 MG: 10 INJECTION, EMULSION INTRAVENOUS at 12:09

## 2020-09-08 RX ADMIN — PROPOFOL 50 MG: 10 INJECTION, EMULSION INTRAVENOUS at 12:09

## 2020-09-08 RX ADMIN — PROPOFOL 30 MG: 10 INJECTION, EMULSION INTRAVENOUS at 12:09

## 2020-09-08 RX ADMIN — LIDOCAINE HYDROCHLORIDE 80 MG: 20 INJECTION, SOLUTION EPIDURAL; INFILTRATION; INTRACAUDAL; PERINEURAL at 12:09

## 2020-09-08 RX ADMIN — FENTANYL CITRATE 100 MCG: 50 INJECTION, SOLUTION INTRAMUSCULAR; INTRAVENOUS at 12:09

## 2020-09-08 RX ADMIN — SODIUM CHLORIDE: 0.9 INJECTION, SOLUTION INTRAVENOUS at 12:09

## 2020-09-08 RX ADMIN — CEFAZOLIN SODIUM 2 G: 2 SOLUTION INTRAVENOUS at 12:09

## 2020-09-08 RX ADMIN — MIDAZOLAM 2 MG: 1 INJECTION INTRAMUSCULAR; INTRAVENOUS at 12:09

## 2020-09-08 RX ADMIN — PROPOFOL 50 MG: 10 INJECTION, EMULSION INTRAVENOUS at 01:09

## 2020-09-08 NOTE — DISCHARGE INSTRUCTIONS
***  ACTIVITY LEVEL: If you have received sedation or an anesthetic, you may feel sleepy for several hours. Rest until you are more awake. Gradually resume your normal activities.    Offload pressure to heel. Only toe touch for transfers only, no walking          Leave dressing on - Keep it clean, dry, and intact until clinic visit                 DIET: You may resume your home diet. If nausea is present, increase your diet gradually with fluids and bland foods.    Medications: Pain medication should be taken only if needed and as directed. If antibiotics are prescribed, the medication should be taken until completed. You will be given an updated list of you medications.    No driving, alcoholic beverages or signing legal documents for next 24 hours or while taking pain medication.  Elevate the affected extremity to a level above your heart and ice packs may be applied in intervals of 15-20 minutes on 15-20 minutes off while awake    CALL THE DOCTOR:    For any obvious bleeding (some dried blood over the incision is normal).      Redness, swelling, foul smell around incision or fever over 101.   Shortness of breath, Coughing up Bloody Sputum or Pains or Swelling in your Calves.   Persistent pain or nausea not relieved by medication.   Impaired circulation such as tingling, change in color, numbness or tingling, coldness.    If any unusual problems or difficulties occur contact your doctor. If you cannot contact your doctor but feel your signs and symptoms warrant a physicians attention return to the emergency room.        Fall Prevention  Millions of people fall every year and injure themselves. You may have had anesthesia or sedation which may increase your risk of falling. You may have health issues that put you at an increased risk of falling.     Here are ways to reduce your risk of falling.  ·   · Make your home safe by keeping walkways clear of objects you may trip over.  · Use non-slip pads under rugs.  Do not use area rugs or small throw rugs.  · Use non-slip mats in bathtubs and showers.  · Install handrails and lights on staircases.  · Do not walk in poorly lit areas.  · Do not stand on chairs or wobbly ladders.  · Use caution when reaching overhead or looking upward. This position can cause a loss of balance.  · Be sure your shoes fit properly, have non-slip bottoms and are in good condition.   · Wear shoes both inside and out. Avoid going barefoot or wearing slippers.  · Be cautious when going up and down stairs, curbs, and when walking on uneven sidewalks.  · If your balance is poor, consider using a cane or walker.  · If your fall was related to alcohol use, stop or limit alcohol intake.   · If your fall was related to use of sleeping medicines, talk to your doctor about this. You may need to reduce your dosage at bedtime if you awaken during the night to go to the bathroom.    · To reduce the need for nighttime bathroom trips:  ¨ Avoid drinking fluids for several hours before going to bed  ¨ Empty your bladder before going to bed  ¨ Men can keep a urinal at the bedside  · Stay as active as you can. Balance, flexibility, strength, and endurance all come from exercise. They all play a role in preventing falls. Ask your healthcare provider which types of activity are right for you.  · Get your vision checked on a regular basis.  · If you have pets, know where they are before you stand up or walk so you don't trip over them.  · Use night lights.

## 2020-09-08 NOTE — TELEPHONE ENCOUNTER
----- Message from Rosio Mayes DPM sent at 9/8/2020  1:26 PM CDT -----  Hi, could you please schedule an appt for the above patient for Fri. 9/11/20. Thanks.

## 2020-09-08 NOTE — TRANSFER OF CARE
Anesthesia Transfer of Care Note    Patient: Suyapa Connelly    Procedure(s) Performed: Procedure(s) (LRB):  DEBRIDEMENT, FOOT (Left)    Patient location: OPS    Anesthesia Type: MAC    Transport from OR: Transported from OR on room air with adequate spontaneous ventilation    Post pain: adequate analgesia    Post assessment: tolerated procedure well and no apparent anesthetic complications    Post vital signs: stable    Level of consciousness: awake, alert and oriented    Nausea/Vomiting: no nausea/vomiting    Complications: none    Transfer of care protocol was followed      Last vitals:   Visit Vitals  BP (!) 148/68 (BP Location: Right arm, Patient Position: Lying)   Pulse 71   Temp 36.9 °C (98.4 °F)   Resp 18   LMP 09/30/2015 (Exact Date)   SpO2 99%   Breastfeeding No

## 2020-09-09 NOTE — OP NOTE
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 Following the successful induction of MAC anesthesia, Following this, a local anesthetic block consisting of aproximately 10ml of  1:1 mixture of 1% lidocaine plain + 0.5% marcaine  plain was injected.  Then, the left  foot was scrubbed, prepped and draped in the usual aseptic manner.No tourniquet applied for duration of the procedure.     Next a sterile #15 blade was used to excisionally debride viable and non viable tissue on the plantar aspect of the left foot. Tissue debrided through epidermis, dermis, and subcutaneous tissue. Tissue excised included epidermis, dermis, sucutaneous tissue, fibrin, biofilm, and callus.Debridement performed to level of muscle. No purulent drainage noted.   Pre debridement: 4.5cmx4.3cmx0.1cm  Post debridement: 5.0cmx5.0cmx0.2cm = 25cm total area of debridement.       The patient tolerated the procedure and anesthesia well. He was transferred to the recovery room with vital signs stable, vascular status intact, and capillary refill time < 3 seconds to the distal left foot. Following a period of post op monitoring, the patient will be discharged home on the following written and oral post op instructions:   1. Keep dressing dry and intact until clinic visit.  2. Avoid excessive ambulation, ambulate with surgical  shoe on at all times.           Estimated Blood Loss: <10ml           Specimens (From admission, onward)    None        Implants: * No implants in log *           Disposition: PACU - hemodynamically stable.           Condition: Good    Attestation:  I was present and scrubbed for the entire procedure.

## 2020-09-10 NOTE — ANESTHESIA POSTPROCEDURE EVALUATION
Anesthesia Post Evaluation    Patient: Suyapa Connelly    Procedure(s) Performed: Procedure(s) (LRB):  DEBRIDEMENT, FOOT (Left)    Final Anesthesia Type: MAC    Patient location during evaluation: [facility]  Patient participation: Yes- Able to Participate  Level of consciousness: awake and alert  Post-procedure vital signs: reviewed and stable  Pain management: adequate  Airway patency: patent    PONV status at discharge: No PONV  Anesthetic complications: no      Cardiovascular status: blood pressure returned to baseline and hemodynamically stable  Respiratory status: unassisted and spontaneous ventilation  Hydration status: euvolemic  Follow-up not needed.          Vitals Value Taken Time   /68 09/08/20 1515   Temp 36.4 °C (97.5 °F) 09/08/20 1515   Pulse 70 09/08/20 1515   Resp 16 09/08/20 1515   SpO2 99 % 09/08/20 1515         No case tracking events are documented in the log.      Pain/Shannan Score: No data recorded

## 2020-09-11 ENCOUNTER — HOSPITAL ENCOUNTER (OUTPATIENT)
Dept: CARDIOLOGY | Facility: HOSPITAL | Age: 55
Discharge: HOME | End: 2020-09-11
Attending: SURGERY
Payer: MEDICAID

## 2020-09-11 ENCOUNTER — TELEPHONE (OUTPATIENT)
Dept: WOUND CARE | Facility: HOSPITAL | Age: 55
End: 2020-09-11

## 2020-09-11 ENCOUNTER — HOSPITAL ENCOUNTER (OUTPATIENT)
Dept: WOUND CARE | Facility: HOSPITAL | Age: 55
Discharge: HOME | End: 2020-09-11
Attending: PODIATRIST
Payer: MEDICAID

## 2020-09-11 VITALS — TEMPERATURE: 97 F | DIASTOLIC BLOOD PRESSURE: 71 MMHG | HEART RATE: 79 BPM | SYSTOLIC BLOOD PRESSURE: 176 MMHG

## 2020-09-11 DIAGNOSIS — E11.621 DIABETIC ULCER OF LEFT HEEL ASSOCIATED WITH TYPE 2 DIABETES MELLITUS, WITH BONE INVOLVEMENT WITHOUT EVIDENCE OF NECROSIS: Primary | ICD-10-CM

## 2020-09-11 DIAGNOSIS — I73.9 PERIPHERAL ARTERY DISEASE: ICD-10-CM

## 2020-09-11 DIAGNOSIS — L97.426 DIABETIC ULCER OF LEFT HEEL ASSOCIATED WITH TYPE 2 DIABETES MELLITUS, WITH BONE INVOLVEMENT WITHOUT EVIDENCE OF NECROSIS: Primary | ICD-10-CM

## 2020-09-11 DIAGNOSIS — M86.172 ACUTE OSTEOMYELITIS OF LEFT CALCANEUS: ICD-10-CM

## 2020-09-11 PROCEDURE — 93922 UPR/L XTREMITY ART 2 LEVELS: CPT | Mod: 26,,, | Performed by: SURGERY

## 2020-09-11 PROCEDURE — 11045 WOUND DEBRIDEMENT: ICD-10-PCS | Mod: ,,, | Performed by: PODIATRIST

## 2020-09-11 PROCEDURE — 99499 UNLISTED E&M SERVICE: CPT | Mod: ,,, | Performed by: PODIATRIST

## 2020-09-11 PROCEDURE — 93926 CV US DOPPLER ARTERIAL LEG LEFT (CUPID ONLY): ICD-10-PCS | Mod: 26,LT,, | Performed by: SURGERY

## 2020-09-11 PROCEDURE — 99499 NO LOS: ICD-10-PCS | Mod: ,,, | Performed by: PODIATRIST

## 2020-09-11 PROCEDURE — 93926 LOWER EXTREMITY STUDY: CPT | Mod: LT

## 2020-09-11 PROCEDURE — 93922 CV US ANKLE BRACHIAL INDICES WO STRESS W TOE TRACINGS (CUPID ONLY): ICD-10-PCS | Mod: 26,,, | Performed by: SURGERY

## 2020-09-11 PROCEDURE — 93926 LOWER EXTREMITY STUDY: CPT | Mod: 26,LT,, | Performed by: SURGERY

## 2020-09-11 PROCEDURE — 93922 UPR/L XTREMITY ART 2 LEVELS: CPT | Mod: 50

## 2020-09-11 PROCEDURE — 11042 WOUND DEBRIDEMENT: ICD-10-PCS | Mod: ,,, | Performed by: PODIATRIST

## 2020-09-11 PROCEDURE — 11045 DBRDMT SUBQ TISS EACH ADDL: CPT | Mod: ,,, | Performed by: PODIATRIST

## 2020-09-11 PROCEDURE — 11042 DBRDMT SUBQ TIS 1ST 20SQCM/<: CPT | Mod: ,,, | Performed by: PODIATRIST

## 2020-09-11 RX ORDER — COLLAGENASE SANTYL 250 [ARB'U]/G
OINTMENT TOPICAL DAILY
Qty: 250 G | Refills: 0 | Status: ON HOLD | OUTPATIENT
Start: 2020-09-11 | End: 2021-06-08 | Stop reason: HOSPADM

## 2020-09-11 NOTE — PROGRESS NOTES
"[facility]                Progress Note    Subjective:       Patient ID: Suyapa Connelly is a 54 y.o. female.    Chief Complaint: No chief complaint on file.    Patient transferred from wheelchair to exam chair with 5/10 pain to LLE. Patient took PO pain medication prior to coming today. Intact staples to Left medial leg and groin. Generalized swelling to LLE with feeling of "tightness". Patient reports that she is supposed to have her staples out by Monday 9/14/20. Aquacel AG alginate dried to wound bed which is a majority yellow slough at center. Will attempt to find patient a Home Health provider for santyl dressing changes. Patient states that her  will likely be able to assist with wound care if she cannot get HH. Santyl prescription sent to pharmacy and patient instructed to . Started santyl in clinic today. S/p one week debridement left heel in OR.       Review of Systems   Constitutional: Negative.    Respiratory: Negative.    Cardiovascular: Positive for leg swelling.   Genitourinary: Negative.    Musculoskeletal: Positive for arthralgias.   Skin: Positive for wound.   Neurological: Negative.    Psychiatric/Behavioral: The patient is not nervous/anxious.          Objective:        Physical Exam  Constitutional:       Appearance: She is well-developed.      Comments: Oriented to time, place, and person.   Cardiovascular:      Comments: DP and PT pulses are palpable bilaterally. 3 sec capillary refill time and toes and feet are warm to touch proximally .  There is  hair growth on the feet and toes b/l. There is no edema b/l. No spider veins or varicosities present b/l.     Musculoskeletal:      Comments: Equinus noted b/l ankles with < 10 deg DF noted. MMT 5/5 in DF/PF/Inv/Ev resistance with no reproduction of pain in any direction. Passive range of motion of ankle and pedal joints is painless b/l.     Feet:      Right foot:      Skin integrity: No " callus or dry skin.      Left foot:      Skin integrity: No callus or dry skin.   Lymphadenopathy:      Comments: Negative lymphadenopathy bilateral popliteal fossa and tarsal tunnel.   Skin:     Comments: See wound description.      Neurological:      Mental Status: She is alert.      Comments: Light touch, proprioception, and sharp/dull sensation are all intact bilaterally. Protective threshold with the [place] monofilament is intact bilaterally.    Psychiatric:         Behavior: Behavior is cooperative.         Vitals:    09/11/20 1304   BP: (!) 176/71   Pulse: 79   Temp: 96.8 °F (36 °C)       Assessment:           ICD-10-CM ICD-9-CM   1. 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 Acute osteomyelitis of left calcaneus  M86.172 730.07            Incision/Site 09/08/20 1316 Left Foot (Active)   09/08/20 1316   Present Prior to Hospital Arrival?:    Side: Left   Location: Foot   Orientation:    Incision Type:    Closure Method:    Additional Comments:    Removal Indication and Assessment:    Wound Outcome:    Removal Indications:    Wound Image    09/11/20 1300   Incision WDL ex 09/11/20 1300   Dressing Appearance Dried drainage 09/11/20 1300   Drainage Amount Moderate 09/11/20 1300   Drainage Characteristics/Odor Serosanguineous 09/11/20 1300   Appearance Pink;Yellow;Not granulating 09/11/20 1300   Black (%), Wound Tissue Color 0 % 09/11/20 1300   Red (%), Wound Tissue Color 40 % 09/11/20 1300   Yellow (%), Wound Tissue Color 60 % 09/11/20 1300   Periwound Area Warm;Dry 09/11/20 1300   Wound Edges Open 09/11/20 1300   Wound Length (cm) 4.5 cm 09/11/20 1300   Wound Width (cm) 5.1 cm 09/11/20 1300   Wound Depth (cm) 0.1 cm 09/11/20 1300   Wound Volume (cm^3) 2.3 cm^3 09/11/20 1300   Wound Surface Area (cm^2) 22.95 cm^2 09/11/20 1300   Care Cleansed with:;Soap and water;Sterile normal saline  09/11/20 1300   Dressing Changed 09/11/20 1300   Off Loading Football dressing 09/11/20 1300           Plan:          Debridement: see procedure note    Dressings:per above  Offloading:Foam    HH orders placed.     Follow-up:Patient is to return to the clinic in 1 week  for follow-up but should call [facility] immediately if any signs of infection, such as fever, chills, sweats, increased redness or pain.    Short-term goals include maintaining good offloading and minimizing bioburden, promoting granulation and epithelialization to healing.  Long-term goals include keeping the wound healed by good offloading and medical management under the direction of internist.          Orders Placed This Encounter   Procedures    Ambulatory referral/consult to Home Health     Standing Status:   Standing     Number of Occurrences:   12     Referral Priority:   Routine     Referral Type:   Home Health     Referral Reason:   Specialty Services Required     Requested Specialty:   Home Health Services     Number of Visits Requested:   1    Change dressing     Clean with saline. Gentian violet periwound. Santyl to wound bed with hydrofera blue classic cut-to-fit on wound bed. Cover with lindsay foam, cast padding x 3, and stockinet.        Follow up in about 1 week (around 9/18/2020) for wound care.

## 2020-09-11 NOTE — TELEPHONE ENCOUNTER
[facility] HH was faxed at [phone] and [facility] HH was faxed at [phone] for HH referral in attempt to find coverage for patient who has medicaid coverage. Received fax confirmation via email for both numbers.

## 2020-09-14 ENCOUNTER — OFFICE VISIT (OUTPATIENT)
Dept: VASCULAR SURGERY | Facility: CLINIC | Age: 55
End: 2020-09-14
Payer: MEDICAID

## 2020-09-14 ENCOUNTER — TELEPHONE (OUTPATIENT)
Dept: VASCULAR SURGERY | Facility: CLINIC | Age: 55
End: 2020-09-14

## 2020-09-14 VITALS
BODY MASS INDEX: 32.65 KG/M2 | DIASTOLIC BLOOD PRESSURE: 88 MMHG | SYSTOLIC BLOOD PRESSURE: 158 MMHG | HEIGHT: 65 IN | WEIGHT: 196 LBS

## 2020-09-14 DIAGNOSIS — I70.229 CRITICAL LOWER LIMB ISCHEMIA: ICD-10-CM

## 2020-09-14 DIAGNOSIS — M79.89 LEG SWELLING: Primary | ICD-10-CM

## 2020-09-14 DIAGNOSIS — Z95.828 S/P FEMORAL-POPLITEAL BYPASS SURGERY: ICD-10-CM

## 2020-09-14 LAB
LEFT ABI: 1.82
LEFT ANT TIBIAL SYS PSV: 56 CM/S
LEFT ARM BP: 140 MMHG
LEFT CFA PSV: 70 CM/S
LEFT DORSALIS PEDIS: 255 MMHG
LEFT EXTERNAL ILIAC PSV: 136 CM/S
LEFT PERONEAL SYS PSV: 63 CM/S
LEFT POST TIBIAL SYS PSV: 97 CM/S
LEFT POSTERIOR TIBIAL: 128 MMHG
LEFT TBI: 0.91
LEFT TOE PRESSURE: 128 MMHG
OHS CV LEFT LOWER EXTREMITY ABI (NO CALC): 0.91
OHS CV LOWER EXTREMITY GRAFT MEASUREMENTS - LEFT - G1 - D: 93
OHS CV LOWER EXTREMITY GRAFT MEASUREMENTS - LEFT - G1 - DA: 189
OHS CV LOWER EXTREMITY GRAFT MEASUREMENTS - LEFT - G1 - M: 120
OHS CV LOWER EXTREMITY GRAFT MEASUREMENTS - LEFT - G1 - P: 116
OHS CV LOWER EXTREMITY GRAFT MEASUREMENTS - LEFT - G1 - PA: 87
RIGHT ABI: 1.82
RIGHT ARM BP: 138 MMHG
RIGHT DORSALIS PEDIS: 255 MMHG
RIGHT POSTERIOR TIBIAL: 255 MMHG
RIGHT TBI: 0.21
RIGHT TOE PRESSURE: 29 MMHG

## 2020-09-14 PROCEDURE — 99999 PR PBB SHADOW E&M-EST. PATIENT-LVL IV: ICD-10-PCS | Mod: PBBFAC,,, | Performed by: SURGERY

## 2020-09-14 PROCEDURE — 99024 POSTOP FOLLOW-UP VISIT: CPT | Mod: ,,, | Performed by: SURGERY

## 2020-09-14 PROCEDURE — 99999 PR PBB SHADOW E&M-EST. PATIENT-LVL IV: CPT | Mod: PBBFAC,,, | Performed by: SURGERY

## 2020-09-14 PROCEDURE — 99024 PR POST-OP FOLLOW-UP VISIT: ICD-10-PCS | Mod: ,,, | Performed by: SURGERY

## 2020-09-14 PROCEDURE — 99214 OFFICE O/P EST MOD 30 MIN: CPT | Mod: PBBFAC | Performed by: SURGERY

## 2020-09-14 NOTE — PROGRESS NOTES
Teddy Huber MD [facility]                         09/14/2020    HPI:  Suyapa Connelly is a 54 y.o. female with   Patient Active Problem List   Diagnosis    Peripheral artery disease    Peripheral vascular disease    PAD (peripheral artery disease)    Hypokalemia, gastrointestinal losses    Type 2 diabetes mellitus without complication, with long-term current use of insulin    Panic attack as reaction to stress    Malnutrition of moderate degree    Essential hypertension    Vitamin D deficiency    CAROLIN (generalized anxiety disorder)    MDD (major depressive disorder), recurrent episode, moderate    Bereavement    Injury to kidney    Chronic combined systolic and diastolic heart failure    Mixed hyperlipidemia    Dilated cardiomyopathy    Acute coronary syndrome    Acute on chronic combined systolic (congestive) and diastolic (congestive) heart failure    Type 2 diabetes mellitus with hyperglycemia, with long-term current use of insulin    Coronary artery disease involving native coronary artery of native heart without angina pectoris    Contrast dye induced nephropathy    Weakness    S/P CABG x 1    Acute blood loss anemia    Paroxysmal atrial fibrillation    Alteration in skin integrity in adult    Preventive measure    Ischemic cardiomyopathy    Non healing left heel wound    Acute osteomyelitis of left calcaneus    Stage 3 chronic kidney disease    Critical lower limb ischemia    S/P femoral-popliteal bypass surgery    Diabetic ulcer of left heel associated with type 2 diabetes mellitus, with bone involvement without evidence of necrosis    Ischemia of lower extremity    being managed by PCP and specialists who is here today for evaluation of PVD.  S/p L fem-BK pop prosthetic bypass.  Doing well.  Patient has no complaints of claudication.  She does have c/o LLE edema. Patient states location is LLE occurring for a few  weeks.  Associated signs and symptoms include decreased ROM although she is ambulating well and had her L foot wound debrided recently with good outcomes.  Quality is heavy and severity is 5/10.  Symptoms began a few weeks ago.  Alleviating factors include elevation.  Worsening factors include dependency.  No rest pain.  + tissue loss.  Patient is diabetic.  Is not on Pletal.  + previous lower extremity interventions.    no MI  no Stroke  Tobacco use: no  Daily Aspirin: yes  Anticoagulation: no    Past Medical History:   Diagnosis Date    Anxiety     Depression     Diabetes mellitus     type 2    Diabetes mellitus, type 2     GERD (gastroesophageal reflux disease)     Hyperlipemia     Hypertension     Myocardial infarction 2010    minor-caused by stress per pt.    Vaginal delivery     x1     Past Surgical History:   Procedure Laterality Date    Angiogram - Right Extremity Right 7/9/15    angiogram-left leg  10/6/15    CATHETERIZATION OF BOTH LEFT AND RIGHT HEART N/A 12/18/2019    Procedure: CATHETERIZATION, HEART, BOTH LEFT AND RIGHT;  Surgeon: Que Fernando III, MD;  Location: [facility] CATH LAB;  Service: Cardiology;  Laterality: N/A;    CORONARY ANGIOGRAPHY N/A 12/18/2019    Procedure: ANGIOGRAM, CORONARY ARTERY;  Surgeon: Que Fernando III, MD;  Location: [facility] CATH LAB;  Service: Cardiology;  Laterality: N/A;    CORONARY ANGIOGRAPHY INCLUDING BYPASS GRAFTS WITH CATHETERIZATION OF LEFT HEART N/A 7/28/2020    Procedure: ANGIOGRAM, CORONARY, INCLUDING BYPASS GRAFT, WITH LEFT HEART CATHETERIZATION, 9 am;  Surgeon: Rachel Easley MD;  Location: [facility] CATH LAB;  Service: Cardiology;  Laterality: N/A;    CORONARY ARTERY BYPASS GRAFT (CABG) N/A 1/14/2020    Procedure: CORONARY ARTERY BYPASS GRAFT (CABG) x 1     Off Pump;  Surgeon: Huang Altamirano MD;  Location: [facility] OR [address] [place] [address];  Service: Cardiovascular;  Laterality: N/A;    CREATION OF FEMOROPOPLITEAL ARTERIAL BYPASS USING GRAFT Left 8/18/2020     Procedure: CREATION, BYPASS, ARTERIAL, FEMORAL TO POPLITEAL, USING GRAFT, LEFT LOWER EXTREMITY;  Surgeon: Teddy Huber MD;  Location: [facility] OR;  Service: Vascular;  Laterality: Left;  REQUEST 7:15 A.M. START----COVID NEGATIVE ON 8/17  1ST CASE STARTE PER LEANA ON 8/7/2020 @ 942AM-LO  RN PREOP 8/12/2020   T/S-----CLEARED BY CARDS-------PENDING INSURANCE    DEBRIDEMENT OF FOOT Left 9/8/2020    Procedure: DEBRIDEMENT, FOOT;  Surgeon: Rosio Mayes DPM;  Location: [facility] OR;  Service: Podiatry;  Laterality: Left;  request neoxx .   RN Pre Op 9-4-2020, Covid negative on 9/5/20. C A    INSERTION OF TUNNELED CENTRAL VENOUS HEMODIALYSIS CATHETER N/A 1/27/2020    Procedure: Insertion, Catheter, Central Venous, Hemodialysis;  Surgeon: ESTEBAN Gomez III, MD;  Location: [facility] CATH LAB;  Service: Peripheral Vascular;  Laterality: N/A;     Family History   Problem Relation Age of Onset    Anesthesia problems Neg Hx      Social History     Socioeconomic History    Marital status: Legally      Spouse name: Not on file    Number of children: Not on file    Years of education: Not on file    Highest education level: Not on file   Occupational History    Not on file   Social Needs    Financial resource strain: Not on file    Food insecurity     Worry: Not on file     Inability: Not on file    Transportation needs     Medical: Not on file     Non-medical: Not on file   Tobacco Use    Smoking status: Never Smoker    Smokeless tobacco: Never Used   Substance and Sexual Activity    Alcohol use: No    Drug use: Yes     Types: Marijuana     Comment: occassional    Sexual activity: Not on file   Lifestyle    Physical activity     Days per week: Not on file     Minutes per session: Not on file    Stress: Not on file   Relationships    Social connections     Talks on phone: Not on file     Gets together: Not on file     Attends Buddhism service: Not on file     Active member of club or organization:  Not on file     Attends meetings of clubs or organizations: Not on file     Relationship status: Not on file   Other Topics Concern    Not on file   Social History Narrative    Not on file       Current Outpatient Medications:     allopurinoL (ZYLOPRIM) 100 MG tablet, Take 1 tablet (100 mg total) by mouth once daily., Disp: 30 tablet, Rfl: 5    amiodarone (PACERONE) 400 MG tablet, Take 1 tablet (400 mg total) by mouth once daily., Disp: 30 tablet, Rfl: 11    aspirin 81 MG Chew, Take 1 tablet (81 mg total) by mouth once daily., Disp: 90 tablet, Rfl: 3    atorvastatin (LIPITOR) 80 MG tablet, Take 1 tablet (80 mg total) by mouth once daily., Disp: 30 tablet, Rfl: 5    carvediloL (COREG) 6.25 MG tablet, Take 6.25 mg by mouth once daily. Pt thinks she take BID. Unsure., Disp: , Rfl:     clopidogreL (PLAVIX) 75 mg tablet, Take 1 tablet (75 mg total) by mouth once daily., Disp: 30 tablet, Rfl: 11    escitalopram oxalate (LEXAPRO) 10 MG tablet, Take 1 tablet (10 mg total) by mouth once daily., Disp: 30 tablet, Rfl: 5    gabapentin (NEURONTIN) 300 MG capsule, Take 1 capsule (300 mg total) by mouth 3 (three) times daily., Disp: 90 capsule, Rfl: 5    hydrALAZINE (APRESOLINE) 25 MG tablet, Take 1 tablet (25 mg total) by mouth every 8 (eight) hours., Disp: 90 tablet, Rfl: 5    insulin aspart U-100 (NOVOLOG) 100 unit/mL injection, Inject 9 Units into the skin 3 (three) times daily with meals., Disp: 10 mL, Rfl: 5    insulin glargine 100 units/mL (3mL) SubQ pen, Inject 10 Units into the skin every evening. (Patient taking differently: Inject 10 Units into the skin every evening. Lantus), Disp: 9 mL, Rfl: 1    metoprolol succinate (TOPROL-XL) 50 MG 24 hr tablet, , Disp: , Rfl:     multivitamin (THERAGRAN) per tablet, Take 1 tablet by mouth once daily., Disp: , Rfl:     piperacillin sodium/tazobactam (PIPERACILLIN-TAZOBACTAM 4.5G/100ML SODIUM CHLORIDE 0.9%-READY TO MIX), Inject 100 mLs (4.5 g total) into the vein  every 8 (eight) hours., Disp:  , Rfl:     sevelamer carbonate (RENVELA) 800 mg Tab, Take 1 tablet (800 mg total) by mouth 3 (three) times daily with meals., Disp: 90 tablet, Rfl: 11    acetaminophen (TYLENOL) 500 MG tablet, Take 1,000 mg by mouth daily as needed for Pain., Disp: , Rfl:     blood sugar diagnostic (ONETOUCH ULTRA BLUE TEST STRIP) Strp, Use to test blood glucose twice daily (Patient not taking: Reported on 9/14/2020), Disp: 100 each, Rfl: 11    collagenase (SANTYL) ointment, Apply topically once daily. (Patient not taking: Reported on 9/14/2020), Disp: 250 g, Rfl: 0    lancets 30 gauge Misc, use to test blood glucose 2 (two) times daily with meals. (Patient not taking: Reported on 9/14/2020), Disp: 100 each, Rfl: 11    ondansetron (ZOFRAN-ODT) 4 MG TbDL, Take 1 tablet (4 mg total) by mouth every 12 (twelve) hours as needed (vomiting). (Patient not taking: Reported on 9/14/2020), Disp: 10 tablet, Rfl: 0    oxyCODONE-acetaminophen (PERCOCET) 5-325 mg per tablet, Take 1 tablet by mouth every 6 (six) hours as needed for Pain. (Patient not taking: Reported on 9/14/2020), Disp: 28 tablet, Rfl: 0    oxyCODONE-acetaminophen (PERCOCET) 5-325 mg per tablet, Take 1 tablet by mouth every 6 (six) hours as needed for Pain. (Patient not taking: Reported on 9/14/2020), Disp: 25 tablet, Rfl: 0    oxyCODONE-acetaminophen (PERCOCET) 5-325 mg per tablet, Take 1 tablet by mouth every 6 (six) hours as needed for Pain. (Patient not taking: Reported on 9/14/2020), Disp: 28 tablet, Rfl: 0    REVIEW OF SYSTEMS:  General: No fevers or chills; ENT: No sore throat; Allergy and Immunology: no persistent infections; Hematological and Lymphatic: No history of bleeding or easy bruising; Endocrine: negative; Respiratory: no cough, shortness of breath, or wheezing; Cardiovascular: no chest pain or dyspnea on exertion; no claudication, no rest pain; Gastrointestinal: no abdominal pain/back, change in bowel habits, or bloody  stools; Genito-Urinary: no dysuria, trouble voiding, or hematuria; Musculoskeletal: negative, + wound; Neurological: no TIA or stroke symptoms; Psychiatric: no nervousness, anxiety or depression.    PHYSICAL EXAM:                General appearance:  Alert, well-appearing, and in no distress.  Oriented to person, place, and time                    Neurological: Normal speech, no focal findings noted; CN II - XII grossly intact. RLE with sensation to light touch, LLE with sensation to light touch.            Musculoskeletal: Digits/nail without cyanosis/clubbing.  Strength 5/5 BLE.                    Neck: Supple, no significant adenopathy, no carotid bruit can be auscultated                  Chest:  Clear to auscultation, no wheezes, rales or rhonchi, symmetric air entry. No use of accessory muscles               Cardiac: Normal rate and regular rhythm, S1 and S2 normal            Abdomen: Soft, nontender, nondistended, no masses or organomegaly, no hernia     No rebound tenderness noted; bowel sounds normal     Pulsatile aortic mass is non palpable.     No groin adenopathy      Extremities:2+ R femoral pulse, 2+ L femoral pulse     1+ R popliteal pulse, non palp L popliteal pulse     1+ R PT pulse, 1+ L PT pulse     1+ R DP pulse, 1+ L DP pulse     no RLE edema, 2+ LLE edema    Skin: RLE without tissue loss; LLE with L heel tissue loss; L groin inc nearly fully healed, L leg inc fully healed    LAB RESULTS:  No results found for: CBC  Lab Results   Component Value Date    LABPROT 10.3 08/24/2020    INR 0.9 08/24/2020     Lab Results   Component Value Date     08/24/2020    K 3.4 (L) 08/24/2020     08/24/2020    CO2 20 (L) 08/24/2020     (H) 08/24/2020    BUN 18 08/24/2020    CREATININE 1.5 (H) 08/24/2020    CALCIUM 8.5 (L) 08/24/2020    ANIONGAP 11 08/24/2020    EGFRNONAA 39 (A) 08/24/2020     Lab Results   Component Value Date    WBC 8.09 08/24/2020    RBC 2.35 (L) 08/24/2020    HGB 7.4 (L)  08/24/2020    HCT 23.5 (L) 08/24/2020     (H) 08/24/2020    MCH 31.5 (H) 08/24/2020    MCHC 31.5 (L) 08/24/2020    RDW 14.1 08/24/2020     (H) 08/24/2020    MPV 10.7 08/24/2020    GRAN 4.6 08/24/2020    GRAN 56.2 08/24/2020    LYMPH 1.9 08/24/2020    LYMPH 24.0 08/24/2020    MONO 1.0 08/24/2020    MONO 11.9 08/24/2020    EOS 0.5 08/24/2020    BASO 0.06 08/24/2020    EOSINOPHIL 6.6 08/24/2020    BASOPHIL 0.7 08/24/2020    DIFFMETHOD Automated 08/24/2020     .  Lab Results   Component Value Date    HGBA1C 10.2 (H) 08/26/2020       IMAGING:  All pertinent imaging has been reviewed and interpreted independently.    ATTILA / TP 9/2020:  ATTILA NA/1.8, TP 29/128    LLE arterial US with patent bypass and 50% stenosis of distal anastomosis    IMP/PLAN:  54 y.o. female with   Patient Active Problem List   Diagnosis    Peripheral artery disease    Peripheral vascular disease    PAD (peripheral artery disease)    Hypokalemia, gastrointestinal losses    Type 2 diabetes mellitus without complication, with long-term current use of insulin    Panic attack as reaction to stress    Malnutrition of moderate degree    Essential hypertension    Vitamin D deficiency    CAROLIN (generalized anxiety disorder)    MDD (major depressive disorder), recurrent episode, moderate    Bereavement    Injury to kidney    Chronic combined systolic and diastolic heart failure    Mixed hyperlipidemia    Dilated cardiomyopathy    Acute coronary syndrome    Acute on chronic combined systolic (congestive) and diastolic (congestive) heart failure    Type 2 diabetes mellitus with hyperglycemia, with long-term current use of insulin    Coronary artery disease involving native coronary artery of native heart without angina pectoris    Contrast dye induced nephropathy    Weakness    S/P CABG x 1    Acute blood loss anemia    Paroxysmal atrial fibrillation    Alteration in skin integrity in adult    Preventive measure    Ischemic  cardiomyopathy    Non healing left heel wound    Acute osteomyelitis of left calcaneus    Stage 3 chronic kidney disease    Critical lower limb ischemia    S/P femoral-popliteal bypass surgery    Diabetic ulcer of left heel associated with type 2 diabetes mellitus, with bone involvement without evidence of necrosis    Ischemia of lower extremity    being managed by PCP and specialists who is here today for evaluation of PVD.    -s/p L fem-pop prosthetic bypass 8/18/20, recovering well, leg staples removed today, groin staples not ready to be removed. - keep inc clean/dry, cont daily ASA  -Cont wound care and offloading  -Exercise  -Heart healthy lifestyle  -RTC 1 week with LLE DVT US    I spent 11 minutes evaluating this patient and greater than 50% of the time was spent counseling, coordinator care and discussing the plan of care.  All questions were answered and patient stated understanding with agreement with the above treatment plan.    Teddy Huber MD [facility]  Vascular and Endovascular Surgery

## 2020-09-14 NOTE — PATIENT INSTRUCTIONS
Peripheral Artery Disease (PAD)     Peripheral artery disease (PAD) occurs when the arteries that carry blood to the limbs are narrowed or blocked. This is usually due to a buildup of a fatty substance called plaque in the walls of the arteries.  PAD most often affects the arteries in the legs. When these arteries are narrowed or blocked, blood flow to the legs is reduced. This can cause leg and foot pain and other symptoms. If severe enough, reduced blood flow to the legs can lead to tissue death (gangrene) and the loss of a toe, foot, or leg. Having PAD also makes it more likely that arteries in other body areas are blocked. For instance, arteries that carry blood to the heart or brain may be affected. This raises the chances of heart attack and stroke.  Risk factors  Certain factors can make PAD more likely. They include:  · Smoking  · Diabetes  · High blood pressure  · Unhealthy cholesterol levels  · Obesity  · Inactive lifestyle  · Older age  · Family history of PAD  Symptoms  Many people with PAD have no symptoms. If symptoms do occur, they can include:  · Pain in the muscles of the calves, thighs or hips that gets worse with activity and better with rest (intermittent claudication)  · Achy, tired, or heavy feeling in the legs  · Weakness, numbness, tingling, or loss of feeling in the legs  · Changes in skin color of the legs  · Sores on the legs and feet  · Cold leg, feet, or toes  · Pain the feet or toes even when lying down (rest pain)  Home care  PAD is a chronic (lifelong) condition. Treatment is focused on managing your condition and lowering your health risks. This may include doing the following:  · If you smoke, quit. This helps prevent further damage to your arteries and lowers your health risks. Ask your provider about medicines or products that can help you quit smoking. Also consider joining a stop-smoking program or support group.  · Be more active. This helps you lose weight and manage  problems such as high blood pressure and unhealthy cholesterol levels. Start a walking program if advised to by your provider. Your provider may also help you form a safe exercise program that is right for your needs.  · Make healthy eating changes. This includes eating less fat, salt, and sugar.  · Take medicines for high blood pressure, unhealthy cholesterol levels, and diabetes as directed.  · Have your blood pressure and cholesterol levels checked as often as directed.  · If you have diabetes, try to keep your blood sugar well controlled. Test your blood sugar as directed.  · If you are overweight, talk to your provider about forming a weight-loss plan.  · Watch for cuts, scrapes, or open sores on your feet. Poor blood flow to the feet may slow healing and increase the risk of infection from these problems.   Follow-up care  Follow up with your healthcare provider, or as advised. If imaging tests such as ultrasound were done, they will be reviewed by a doctor. You will be told the results and any new findings that may affect your care.  When to seek medical advice   Call your healthcare provider right away if any of these occur:  · Sudden severe pain in the legs or feet  · Sudden cold, pale or blue color in the legs or feet  · Weakness or numbness in the legs or feet that worsens  · Any sore or wound in the legs or feet that wont heal  · Weak pulse in your legs or feet  Know the Signs of Heart Attack and Stroke  People with PAD are at high risk for heart attack and stroke. Knowing the signs of these problems can help you protect your health and get help when you need it. Call 911 right away if you have any of the following:  Signs of a Heart Attack  · Chest discomfort, such as pain, aching, tightness, or pressure that lasts more than a few minutes, or that comes and goes  · Pain or discomfort in the arms, back, shoulders, neck, or jaw  · Shortness of breath  · Sweating (often a cold, clammy  sweat)  · Nausea  · Lightheadedness  Signs of a Stroke  · Sudden numbness or weakness of the face, arms, or legs, especially on one side  · Sudden confusion or trouble speaking or understanding  · Sudden trouble seeing in one or both eyes  · Sudden trouble walking, dizziness, or loss of balance  · Sudden, severe headache with no known cause   Date Last Reviewed: 9/21/2015  © 8952-8870 [employer]. [address] [place] [address], [place] [id number]. All rights reserved. This information is not intended as a substitute for professional medical care. Always follow your healthcare professional's instructions.

## 2020-09-14 NOTE — TELEPHONE ENCOUNTER
Call and gave patient date and time of appointment for ultrasound this week. She stated understanding.

## 2020-09-17 NOTE — H&P
[facility]  History & Physical    SUBJECTIVE:       Patient ID: Suyapa Connelly is a 54 y.o. female.     Chief Complaint: Non-healing Wound Follow Up and Diabetic Foot Ulcer     Plan for debridement today left heel ulceration.         Review of Systems   Constitutional: Negative.    Respiratory: Negative.    Gastrointestinal: Negative.    Genitourinary: Negative.    Musculoskeletal: Positive for myalgias.   Skin: Positive for wound.   Neurological: Negative.    Psychiatric/Behavioral: The patient is not nervous/anxious.           Objective:      Physical Exam  Constitutional:       Appearance: She is well-developed.   Cardiovascular:      Comments: Dorsalis pedis and posterior tibial pulses are diminished Bilaterally. Toes are cool to touch. Feet are warm proximally.There is decreased digital hair. Skin is atrophic, slightly hyperpigmented, and mildly edematous   Pulmonary:      Effort: No respiratory distress.   Musculoskeletal:         General: Tenderness present.      Comments: Adequate joint range of motion without pain, limitation, nor crepitation Bilateral feet and ankle joints. Muscle strength is 5/5 in all groups bilaterally.    Feet:      Right foot:      Skin integrity: Callus and dry skin present. No ulcer or skin breakdown.      Left foot:      Skin integrity: Dry skin present. No ulcer.   Skin:     General: Skin is dry.      Findings: No erythema.      Comments: See wound description.      Neurological:      Mental Status: She is alert.      Comments: [place] 5.07 monofilamant testing is diminished Tyrell feet. Sharp/dull sensation diminished Bilaterally. Light touch absent Bilaterally.             Vitals:     09/04/20 1338   BP: (!) 127/59   Pulse: 78   Temp: 97.8 °F (36.6 °C)         Assessment:              ICD-10-CM ICD-9-CM   1. Diabetic ulcer of left heel associated with type 2 diabetes mellitus, with bone involvement without evidence of necrosis  E11.621 250.80      L97.426 707.14                   Wound 08/07/20 1342 Diabetic Ulcer Left Heel (Active)   08/07/20 1342    Pre-existing: Yes   Primary Wound Type: Diabetic ulc   Side: Left   Orientation:    Location: Heel   Wound Number (optional):    Ankle-Brachial Index:    Pulses:    Removal Indication and Assessment:    Wound Outcome:    (Retired) Wound Type:    (Retired) Wound Length (cm):    (Retired) Wound Width (cm):    (Retired) Depth (cm):    Wound Description (Comments):    Removal Indications:    Wound Image   09/04/20 1300   Wound WDL ex 09/04/20 1300   Dressing Appearance Intact;Moist drainage 09/04/20 1300   Drainage Amount Small 09/04/20 1300   Drainage Characteristics/Odor Serosanguineous 09/04/20 1300   Appearance White;Eschar;Red;Pink 09/04/20 1300   Tissue loss description Full thickness 09/04/20 1300   Black (%), Wound Tissue Color 85 % 09/04/20 1300   Red (%), Wound Tissue Color 5 % 09/04/20 1300   Yellow (%), Wound Tissue Color 10 % 09/04/20 1300   Periwound Area Macerated;Dry 09/04/20 1300   Wound Edges Callused 09/04/20 1300   Wound Length (cm) 4.5 cm 09/04/20 1300   Wound Width (cm) 5.2 cm 09/04/20 1300   Wound Depth (cm) 0.3 cm 09/04/20 1300   Wound Volume (cm^3) 7.02 cm^3 09/04/20 1300   Wound Surface Area (cm^2) 23.4 cm^2 09/04/20 1300   Tunneling (depth (cm)/location) 0 09/04/20 1300   Undermining (depth (cm)/location) 0 09/04/20 1300   Care Soap and water;Sterile normal saline;Wound cleanser 09/04/20 1300   Dressing Applied;Other (see comments);Foam 09/04/20 1300   Periwound Care Other (see comments) 09/04/20 1300   Off Loading Football dressing 09/04/20 1300             Plan:       Plan for OR debridement left foot today    The risks, benefits, complications, treatment options, and expected outcomes were discussed with the patient. The risks and potential complications of their problem and purposed treatment include but are not limited to infection, nerve injury, vascular injury, persistent pain,  potential skin necrosis, deep vein thrombosis, possible pulmonary embolus,and complications of the anesthetics. Informed verbal and written consent was obtained. Consent forms read, signed, witnessed.      F/u one week.

## 2020-09-17 NOTE — DISCHARGE SUMMARY
[facility]  Discharge Summary      Admit Date: 9/8/2020    Discharge Date and Time: 9/8/2020  3:16 PM    Attending Physician: No att. providers found     Reason for Admission: left foot debridement     Procedures Performed: Procedure(s) (LRB):  DEBRIDEMENT, FOOT (Left)    Hospital Course (synopsis of major diagnoses, care, treatment, and services provided during the course of the hospital stay): Patient was admitted for an inpatient procedure and tolerated the procedure well with no complications.       Consults: none   Significant Diagnostic Studies: none  Final Diagnoses:    Principal Problem: <principal problem not specified>   Secondary Diagnoses: There are no hospital problems to display for this patient.      Discharged Condition: good    Disposition: Home or Self Care    Follow Up/Patient Instructions:     Medications:  Reconciled Home Medications:      Medication List      CHANGE how you take these medications    insulin glargine 100 units/mL (3mL) SubQ pen  Inject 10 Units into the skin every evening.  What changed: additional instructions     * oxyCODONE-acetaminophen 5-325 mg per tablet  Commonly known as: PERCOCET  Take 1 tablet by mouth every 6 (six) hours as needed for Pain.  What changed: Another medication with the same name was added. Make sure you understand how and when to take each.     * oxyCODONE-acetaminophen 5-325 mg per tablet  Commonly known as: PERCOCET  Take 1 tablet by mouth every 6 (six) hours as needed for Pain.  What changed: Another medication with the same name was added. Make sure you understand how and when to take each.     * oxyCODONE-acetaminophen 5-325 mg per tablet  Commonly known as: PERCOCET  Take 1 tablet by mouth every 6 (six) hours as needed for Pain.  What changed: You were already taking a medication with the same name, and this prescription was added. Make sure you understand how and when to take each.         * This list has 3 medication(s) that  are the same as other medications prescribed for you. Read the directions carefully, and ask your doctor or other care provider to review them with you.            CONTINUE taking these medications    acetaminophen 500 MG tablet  Commonly known as: TYLENOL  Take 1,000 mg by mouth daily as needed for Pain.     allopurinoL 100 MG tablet  Commonly known as: ZYLOPRIM  Take 1 tablet (100 mg total) by mouth once daily.     amiodarone 400 MG tablet  Commonly known as: PACERONE  Take 1 tablet (400 mg total) by mouth once daily.     aspirin 81 MG Chew  Take 1 tablet (81 mg total) by mouth once daily.     atorvastatin 80 MG tablet  Commonly known as: LIPITOR  Take 1 tablet (80 mg total) by mouth once daily.     carvediloL 6.25 MG tablet  Commonly known as: COREG  Take 6.25 mg by mouth once daily. Pt thinks she take BID. Unsure.     clopidogreL 75 mg tablet  Commonly known as: PLAVIX  Take 1 tablet (75 mg total) by mouth once daily.     escitalopram oxalate 10 MG tablet  Commonly known as: LEXAPRO  Take 1 tablet (10 mg total) by mouth once daily.     gabapentin 300 MG capsule  Commonly known as: NEURONTIN  Take 1 capsule (300 mg total) by mouth 3 (three) times daily.     hydrALAZINE 25 MG tablet  Commonly known as: APRESOLINE  Take 1 tablet (25 mg total) by mouth every 8 (eight) hours.     insulin aspart U-100 100 unit/mL injection  Commonly known as: NOVOLOG  Inject 9 Units into the skin 3 (three) times daily with meals.     metoprolol succinate 50 MG 24 hr tablet  Commonly known as: TOPROL-XL     multivitamin per tablet  Commonly known as: THERAGRAN  Take 1 tablet by mouth once daily.     ondansetron 4 MG Tbdl  Commonly known as: ZOFRAN-ODT  Take 1 tablet (4 mg total) by mouth every 12 (twelve) hours as needed (vomiting).     ONETOUCH DELICA PLUS LANCET 30 gauge Misc  Generic drug: lancets  use to test blood glucose 2 (two) times daily with meals.     ONETOUCH ULTRA BLUE TEST STRIP Strp  Generic drug: blood sugar  diagnostic  Use to test blood glucose twice daily     PIPERACILLIN-TAZOBACTAM 4.5G/100ML SODIUM CHLORIDE 0.9%-READY TO MIX  Inject 100 mLs (4.5 g total) into the vein every 8 (eight) hours.     sevelamer carbonate 800 mg Tab  Commonly known as: RENVELA  Take 1 tablet (800 mg total) by mouth 3 (three) times daily with meals.          Discharge Procedure Orders   COVID-19 Routine Screening   Standing Status: Future Number of Occurrences: 1 Standing Exp. Date: 11/03/21   Order Comments: TO BE DONE AT [facility] [place]     Order Specific Question Answer Comments   Is the patient symptomatic? No    Is this needed for pre-procedure or pre-op testing? Yes    Diagnosis: Preoperative testing [[id number]]      Diet general     Call MD for:  temperature >100.4     Call MD for:  persistent nausea and vomiting     Call MD for:  severe uncontrolled pain     Call MD for:  difficulty breathing, headache or visual disturbances     Call MD for:  redness, tenderness, or signs of infection (pain, swelling, redness, odor or green/yellow discharge around incision site)     Leave dressing on - Keep it clean, dry, and intact until clinic visit     Activity as tolerated

## 2020-09-18 ENCOUNTER — TELEPHONE (OUTPATIENT)
Dept: WOUND CARE | Facility: HOSPITAL | Age: 55
End: 2020-09-18

## 2020-09-18 ENCOUNTER — HOSPITAL ENCOUNTER (OUTPATIENT)
Dept: WOUND CARE | Facility: HOSPITAL | Age: 55
Discharge: HOME | End: 2020-09-18
Attending: PODIATRIST
Payer: MEDICAID

## 2020-09-18 VITALS — HEART RATE: 85 BPM | TEMPERATURE: 98 F | SYSTOLIC BLOOD PRESSURE: 168 MMHG | DIASTOLIC BLOOD PRESSURE: 75 MMHG

## 2020-09-18 DIAGNOSIS — L97.426 DIABETIC ULCER OF LEFT HEEL ASSOCIATED WITH TYPE 2 DIABETES MELLITUS, WITH BONE INVOLVEMENT WITHOUT EVIDENCE OF NECROSIS: Primary | ICD-10-CM

## 2020-09-18 DIAGNOSIS — E11.621 DIABETIC ULCER OF LEFT HEEL ASSOCIATED WITH TYPE 2 DIABETES MELLITUS, WITH BONE INVOLVEMENT WITHOUT EVIDENCE OF NECROSIS: Primary | ICD-10-CM

## 2020-09-18 PROCEDURE — 11042 DBRDMT SUBQ TIS 1ST 20SQCM/<: CPT | Mod: ,,, | Performed by: PODIATRIST

## 2020-09-18 PROCEDURE — 11045 DBRDMT SUBQ TISS EACH ADDL: CPT

## 2020-09-18 PROCEDURE — 11042 DBRDMT SUBQ TIS 1ST 20SQCM/<: CPT | Performed by: PODIATRIST

## 2020-09-18 PROCEDURE — 11045 DBRDMT SUBQ TISS EACH ADDL: CPT | Mod: ,,, | Performed by: PODIATRIST

## 2020-09-18 PROCEDURE — 99499 NO LOS: ICD-10-PCS | Mod: ,,, | Performed by: PODIATRIST

## 2020-09-18 PROCEDURE — 99499 UNLISTED E&M SERVICE: CPT | Mod: ,,, | Performed by: PODIATRIST

## 2020-09-18 PROCEDURE — 27201912 HC WOUND CARE DEBRIDEMENT SUPPLIES: Performed by: PODIATRIST

## 2020-09-18 PROCEDURE — 11045 WOUND DEBRIDEMENT: ICD-10-PCS | Mod: ,,, | Performed by: PODIATRIST

## 2020-09-18 PROCEDURE — 11042 WOUND DEBRIDEMENT: ICD-10-PCS | Mod: ,,, | Performed by: PODIATRIST

## 2020-09-18 NOTE — PROGRESS NOTES
[facility]                Progress Note    Subjective:       Patient ID: Suyapa Connelly is a 54 y.o. female.    Chief Complaint: Diabetic Foot Ulcer    Patient self-transferred from wheelchair to exam chair without assistance. Reports 6/10 pain that increased with wound cleansing. Left Heel Surgical Wound measures the same as last week. Yellow slough to a majority of wound bed. Patient spoke to pharmacy who stated that they were in the process of getting in santyl from prescription. Foot remains swollen in appearance. LLE swollen overall. Will continue with santyl this week and follow up with application for HBO and to start Home Health. Discussed having  possibly help if Home Health does not get approved. Spoke with [facility] and asked to resend patient's paperwork for approval.       Review of Systems   Constitutional: Negative.    Respiratory: Negative.    Cardiovascular: Positive for leg swelling.   Gastrointestinal: Negative.    Genitourinary: Negative.    Musculoskeletal: Negative.    Skin: Positive for wound.   Neurological: Negative.    Psychiatric/Behavioral: The patient is not nervous/anxious.          Objective:        Physical Exam  Constitutional:       Appearance: She is well-developed.      Comments: Oriented to time, place, and person.   Cardiovascular:      Comments: DP and PT pulses are palpable bilaterally. 3 sec capillary refill time and toes and feet are warm to touch proximally .  There is  hair growth on the feet and toes b/l. There is no edema b/l. No spider veins or varicosities present b/l.     Musculoskeletal:      Comments: Equinus noted b/l ankles with < 10 deg DF noted. MMT 5/5 in DF/PF/Inv/Ev resistance with no reproduction of pain in any direction. Passive range of motion of ankle and pedal joints is painless b/l.     Feet:      Right foot:      Skin integrity: No callus or dry skin.      Left foot:      Skin integrity: No  callus or dry skin.   Lymphadenopathy:      Comments: Negative lymphadenopathy bilateral popliteal fossa and tarsal tunnel.   Skin:     Comments: See wound description.    Neurological:      Mental Status: She is alert.      Comments: Light touch, proprioception, and sharp/dull sensation are all intact bilaterally. Protective threshold with the Rexford-Wienstein monofilament is intact bilaterally.    Psychiatric:         Behavior: Behavior is cooperative.         Vitals:    09/18/20 1310   BP: (!) 168/75   Pulse: 85   Temp: 98 °F (36.7 °C)       Assessment:           ICD-10-CM ICD-9-CM   1. Diabetic ulcer of left heel associated with type 2 diabetes mellitus, with bone involvement without evidence of necrosis  E11.621 250.80    L97.426 707.14            Incision/Site 09/08/20 1316 Left Heel (Active)   09/08/20 1316   Present Prior to Hospital Arrival?:    Side: Left   Location: Heel   Orientation:    Incision Type:    Closure Method:    Additional Comments:    Removal Indication and Assessment:    Wound Outcome:    Removal Indications:    Wound Image    09/18/20 1300   Incision WDL ex 09/18/20 1300   Dressing Appearance Intact 09/18/20 1300   Drainage Amount Moderate 09/18/20 1300   Drainage Characteristics/Odor Serosanguineous 09/18/20 1300   Appearance Yellow;Slough;Pink;Not granulating 09/18/20 1300   Black (%), Wound Tissue Color 0 % 09/18/20 1300   Red (%), Wound Tissue Color 10 % 09/18/20 1300   Yellow (%), Wound Tissue Color 90 % 09/18/20 1300   Periwound Area Intact 09/18/20 1300   Wound Edges Defined 09/18/20 1300   Wound Length (cm) 4.5 cm 09/18/20 1300   Wound Width (cm) 5 cm 09/18/20 1300   Wound Depth (cm) 0.5 cm 09/18/20 1300   Wound Volume (cm^3) 11.25 cm^3 09/18/20 1300   Wound Surface Area (cm^2) 22.5 cm^2 09/18/20 1300   Care Cleansed with:;Soap and water;Sterile normal saline 09/18/20 1300   Dressing Changed 09/18/20 1300   Periwound Care Moisture barrier applied 09/18/20 1300   Off Loading  Football dressing 09/18/20 1300           Plan:            Debridement: see procedure note     Dressings: per above  Offloading:Foam    Follow-up:Patient is to return to the clinic in 1 week  for follow-up but should call [facility] immediately if any signs of infection, such as fever, chills, sweats, increased redness or pain.    Short-term goals include maintaining good offloading and minimizing bioburden, promoting granulation and epithelialization to healing.  Long-term goals include keeping the wound healed by good offloading and medical management under the direction of internist.        Orders Placed This Encounter   Procedures    Ambulatory referral/consult to Home Health     Standing Status:   Standing     Number of Occurrences:   12     Referral Priority:   Routine     Referral Type:   Home Health     Referral Reason:   Specialty Services Required     Requested Specialty:   Home Health Services     Number of Visits Requested:   1    Change dressing     Clean with saline. Gentian violet periwound. Apply santyl to wound with hydrofera blue transfer to wound. Cover with lindsay foam x 2, cast padding x 3, and stockinet.     Standing Status:   Standing     Number of Occurrences:   1        Follow up in about 1 week (around 9/25/2020) for wound care.

## 2020-09-18 NOTE — TELEPHONE ENCOUNTER
Re-sending HH orders to [facility] today, as requested. Patient is in-network with [facility], but not with [facility].

## 2020-09-21 ENCOUNTER — TELEPHONE (OUTPATIENT)
Dept: VASCULAR SURGERY | Facility: CLINIC | Age: 55
End: 2020-09-21

## 2020-09-21 NOTE — PROCEDURES
Wound Debridement    Date/Time: 9/11/2020 3:13 PM  Performed by: Rosio Mayes DPM  Authorized by: Rosio Mayes DPM     Consent Done?:  Yes (Written)    Wound Details:    Location:  Left foot    Location:  Left Heel    Type of Debridement:  Excisional       Length (cm):  4.5       Area (sq cm):  22.95       Width (cm):  5.1       Percent Debrided (%):  100       Depth (cm):  0.1       Total Area Debrided (sq cm):  22.95    Depth of debridement:  Subcutaneous tissue    Tissue debrided:  Dermis    Devitalized tissue debrided:  Biofilm, Callus, Fibrin and Exudate    Instruments:  Curette    Bleeding:  Minimal  Hemostasis Achieved: Yes    Method Used:  Pressure  Patient tolerance:  Patient tolerated the procedure well with no immediate complications

## 2020-09-21 NOTE — TELEPHONE ENCOUNTER
Attempted to contact patient about missed appointment. No answer and no voicemail.    Reviewed with patient and agree with technician note, also regarding: Past Ocular/Family Ocular Hx,   allergies and medications.   Past Ocular history notation: L abrasion; nuclear sclerosis    (Retinoscopy entered with manifest when full refraction. Over refraction-only* is performed if patient had no visual complaints, or declined full refraction.)    Social/Occupational Hx/Notes: realtor    Head/Face Exam: Normal  Mental Status:  Alert/Oriented x 3  See  other clinical data/information in relevant sections.    ASSESSMENT/PLAN  (pre)Cataract: both eyes, currently not affecting activities of daily living.   Continue to follow conservatively, no surgery needed.  Refractive error - mild change, OR demo, wants new glasses,  and a copy of physician FINAL RX was given to get new glasses

## 2020-09-30 ENCOUNTER — OFFICE VISIT (OUTPATIENT)
Dept: CARDIOLOGY | Facility: CLINIC | Age: 55
End: 2020-09-30
Payer: MEDICAID

## 2020-09-30 VITALS
SYSTOLIC BLOOD PRESSURE: 138 MMHG | HEART RATE: 101 BPM | DIASTOLIC BLOOD PRESSURE: 86 MMHG | BODY MASS INDEX: 32.32 KG/M2 | HEIGHT: 65 IN | WEIGHT: 194 LBS | OXYGEN SATURATION: 98 %

## 2020-09-30 DIAGNOSIS — I25.10 CORONARY ARTERY DISEASE INVOLVING NATIVE CORONARY ARTERY OF NATIVE HEART WITHOUT ANGINA PECTORIS: ICD-10-CM

## 2020-09-30 DIAGNOSIS — Z79.4 TYPE 2 DIABETES MELLITUS WITH HYPERGLYCEMIA, WITH LONG-TERM CURRENT USE OF INSULIN: ICD-10-CM

## 2020-09-30 DIAGNOSIS — I73.9 PAD (PERIPHERAL ARTERY DISEASE): ICD-10-CM

## 2020-09-30 DIAGNOSIS — E11.65 TYPE 2 DIABETES MELLITUS WITH HYPERGLYCEMIA, WITH LONG-TERM CURRENT USE OF INSULIN: ICD-10-CM

## 2020-09-30 DIAGNOSIS — Z95.828 S/P FEMORAL-POPLITEAL BYPASS SURGERY: ICD-10-CM

## 2020-09-30 DIAGNOSIS — I50.42 CHRONIC COMBINED SYSTOLIC AND DIASTOLIC HEART FAILURE: ICD-10-CM

## 2020-09-30 DIAGNOSIS — I25.5 ISCHEMIC CARDIOMYOPATHY: Primary | ICD-10-CM

## 2020-09-30 DIAGNOSIS — N18.30 STAGE 3 CHRONIC KIDNEY DISEASE: ICD-10-CM

## 2020-09-30 DIAGNOSIS — Z95.1 S/P CABG X 1: ICD-10-CM

## 2020-09-30 DIAGNOSIS — I10 ESSENTIAL HYPERTENSION: ICD-10-CM

## 2020-09-30 PROCEDURE — 99215 PR OFFICE/OUTPT VISIT, EST, LEVL V, 40-54 MIN: ICD-10-PCS | Mod: S$PBB,,, | Performed by: INTERNAL MEDICINE

## 2020-09-30 PROCEDURE — 99999 PR PBB SHADOW E&M-EST. PATIENT-LVL V: CPT | Mod: PBBFAC,,, | Performed by: INTERNAL MEDICINE

## 2020-09-30 PROCEDURE — 99215 OFFICE O/P EST HI 40 MIN: CPT | Mod: S$PBB,,, | Performed by: INTERNAL MEDICINE

## 2020-09-30 PROCEDURE — 99215 OFFICE O/P EST HI 40 MIN: CPT | Mod: PBBFAC | Performed by: INTERNAL MEDICINE

## 2020-09-30 PROCEDURE — 99999 PR PBB SHADOW E&M-EST. PATIENT-LVL V: ICD-10-PCS | Mod: PBBFAC,,, | Performed by: INTERNAL MEDICINE

## 2020-09-30 RX ORDER — AMIODARONE HYDROCHLORIDE 200 MG/1
200 TABLET ORAL DAILY
Qty: 90 TABLET | Refills: 3 | Status: SHIPPED | OUTPATIENT
Start: 2020-09-30 | End: 2020-12-08

## 2020-09-30 NOTE — PROCEDURES
Wound Debridement    Date/Time: 9/18/2020 3:00 PM  Performed by: Rosio Mayes DPM  Authorized by: Rosio Mayes DPM     Consent Done?:  Yes (Written)    Wound Details:    Location:  Left foot    Location:  Left Heel    Type of Debridement:  Excisional       Length (cm):  4.5       Area (sq cm):  22.5       Width (cm):  5       Percent Debrided (%):  100       Depth (cm):  0.5       Total Area Debrided (sq cm):  22.5    Depth of debridement:  Subcutaneous tissue    Tissue debrided:  Dermis    Devitalized tissue debrided:  Biofilm, Callus, Exudate and Fibrin    Instruments:  Blade and Curette    Bleeding:  Minimal  Hemostasis Achieved: Yes    Method Used:  Pressure  Patient tolerance:  Patient tolerated the procedure well with no immediate complications

## 2020-09-30 NOTE — PROGRESS NOTES
CARDIOVASCULAR CONSULTATION    REASON FOR CONSULT:   Suyapa Connelly is a 54 y.o. female who presents for follow-up after recent hospitalization.      HISTORY OF PRESENT ILLNESS:         Patient is a pleasant 54-year-old lady.  Here for follow-up after recent hospitalization.  Past medical history significant for coronary artery disease status post LIMA to LAD and PCI of circumflex, diabetes, peripheral vascular disease status post surgery and being managed by vascular surgery, dyslipidemia, hypertension.  Is here for follow-up after recent hospitalization.  Doing fine.  Denies any chest pains at rest on exertion, orthopnea, PND.  Postoperatively after her bypass surgery had brief paroxysmal atrial fibrillation has been in sinus rhythm since then.      Successful IVUS guided PCI of mid circumflex 80-85% stenosis with drug-eluting stent x1.  A 3.5 into 26 mm drug-eluting stent was implanted with excellent angiographic results and with IVUS.  SUSIE 3 flow post PCI     Coronary angiogram:     Left main:  No significant stenosis     Lad:  Severe proximal LAD stenosis.  Distal LAD gets supply from LIMA to LAD.  Lima to LAD is widely patent.  No significant stenosis noted after touchdown of LIMA to LAD.     Circumflex:  Mid circumflex 80-85% stenosis     RCA:   in the mid RCA.  Distal RCA gets supply from well-developed collaterals from the left        Access:  6 Prydeinig right common femoral artery access.  Sheath sutured in.  Will be pulled when ACT less than 160     Assessment and plan      aspirin 81 mg daily indefinitely     Plavix 75 mg daily uninterrupted for at least 1 year.      · Low normal left ventricular systolic function. The estimated ejection fraction is 50%.  · Concentric left ventricular hypertrophy.  · Indeterminate left ventricular diastolic function.  · Normal right ventricular systolic function.  · Mild left atrial enlargement.  · Mild aortic regurgitation.  · Mild mitral regurgitation.  · Mild  tricuspid regurgitation.  · Normal central venous pressure (3 mmHg).  · The estimated PA systolic pressure is 37 mmHg.        PAST MEDICAL HISTORY:     Past Medical History:   Diagnosis Date    Anxiety     Depression     Diabetes mellitus     type 2    Diabetes mellitus, type 2     GERD (gastroesophageal reflux disease)     Hyperlipemia     Hypertension     Myocardial infarction 2010    minor-caused by stress per pt.    Vaginal delivery     x1       PAST SURGICAL HISTORY:     Past Surgical History:   Procedure Laterality Date    Angiogram - Right Extremity Right 7/9/15    angiogram-left leg  10/6/15    CATHETERIZATION OF BOTH LEFT AND RIGHT HEART N/A 12/18/2019    Procedure: CATHETERIZATION, HEART, BOTH LEFT AND RIGHT;  Surgeon: Que Fernando III, MD;  Location: [facility] CATH LAB;  Service: Cardiology;  Laterality: N/A;    CORONARY ANGIOGRAPHY N/A 12/18/2019    Procedure: ANGIOGRAM, CORONARY ARTERY;  Surgeon: Que Fernando III, MD;  Location: [facility] CATH LAB;  Service: Cardiology;  Laterality: N/A;    CORONARY ANGIOGRAPHY INCLUDING BYPASS GRAFTS WITH CATHETERIZATION OF LEFT HEART N/A 7/28/2020    Procedure: ANGIOGRAM, CORONARY, INCLUDING BYPASS GRAFT, WITH LEFT HEART CATHETERIZATION, 9 am;  Surgeon: Rachel Easley MD;  Location: [facility] CATH LAB;  Service: Cardiology;  Laterality: N/A;    CORONARY ARTERY BYPASS GRAFT (CABG) N/A 1/14/2020    Procedure: CORONARY ARTERY BYPASS GRAFT (CABG) x 1     Off Pump;  Surgeon: Huang Altamirano MD;  Location: [address];  Service: Cardiovascular;  Laterality: N/A;    CREATION OF FEMOROPOPLITEAL ARTERIAL BYPASS USING GRAFT Left 8/18/2020    Procedure: CREATION, BYPASS, ARTERIAL, FEMORAL TO POPLITEAL, USING GRAFT, LEFT LOWER EXTREMITY;  Surgeon: Teddy Huber MD;  Location: [facility];  Service: Vascular;  Laterality: Left;  REQUEST 7:15 A.M. START----COVID NEGATIVE ON 8/17  1ST CASE STARTE PER LEANA ON 8/7/2020 @ 942AM-  RN PREOP 8/12/2020    T/S-----CLEARED BY CARDS-------PENDING INSURANCE    DEBRIDEMENT OF FOOT Left 9/8/2020    Procedure: DEBRIDEMENT, FOOT;  Surgeon: Rosio Mayes DPM;  [facility];  Service: Podiatry;  Laterality: Left;  request neoxx .   RN Pre Op 9-4-2020, Covid negative on 9/5/20. C A    INSERTION OF TUNNELED CENTRAL VENOUS HEMODIALYSIS CATHETER N/A 1/27/2020    Procedure: Insertion, Catheter, Central Venous, Hemodialysis;  Surgeon: ESTEBAN Gomez III, MD;  Location: [facility] CATH LAB;  Service: Peripheral Vascular;  Laterality: N/A;       ALLERGIES AND MEDICATION:     Review of patient's allergies indicates:   Allergen Reactions    Ciprofloxacin      Itchiness    Contrast media      Kidney injury        Medication List          Accurate as of September 30, 2020  9:33 AM. If you have any questions, ask your nurse or doctor.            CHANGE how you take these medications    insulin glargine 100 units/mL (3mL) SubQ pen  Inject 10 Units into the skin every evening.  What changed: additional instructions        CONTINUE taking these medications    acetaminophen 500 MG tablet  Commonly known as: TYLENOL     allopurinoL 100 MG tablet  Commonly known as: ZYLOPRIM  Take 1 tablet (100 mg total) by mouth once daily.     amiodarone 400 MG tablet  Commonly known as: PACERONE  Take 1 tablet (400 mg total) by mouth once daily.     aspirin 81 MG Chew  Take 1 tablet (81 mg total) by mouth once daily.     atorvastatin 80 MG tablet  Commonly known as: LIPITOR  Take 1 tablet (80 mg total) by mouth once daily.     carvediloL 6.25 MG tablet  Commonly known as: COREG     clopidogreL 75 mg tablet  Commonly known as: PLAVIX  Take 1 tablet (75 mg total) by mouth once daily.     escitalopram oxalate 10 MG tablet  Commonly known as: LEXAPRO  Take 1 tablet (10 mg total) by mouth once daily.     gabapentin 300 MG capsule  Commonly known as: NEURONTIN  Take 1 capsule (300 mg total) by mouth 3 (three) times daily.     hydrALAZINE 25 MG  tablet  Commonly known as: APRESOLINE  Take 1 tablet (25 mg total) by mouth every 8 (eight) hours.     insulin aspart U-100 100 unit/mL injection  Commonly known as: NOVOLOG  Inject 9 Units into the skin 3 (three) times daily with meals.     metoprolol succinate 50 MG 24 hr tablet  Commonly known as: TOPROL-XL     multivitamin per tablet  Commonly known as: THERAGRAN     ondansetron 4 MG Tbdl  Commonly known as: ZOFRAN-ODT  Take 1 tablet (4 mg total) by mouth every 12 (twelve) hours as needed (vomiting).     ONETOUCH DELICA PLUS LANCET 30 gauge Misc  Generic drug: lancets  use to test blood glucose 2 (two) times daily with meals.     ONETOUCH ULTRA BLUE TEST STRIP Strp  Generic drug: blood sugar diagnostic  Use to test blood glucose twice daily     * oxyCODONE-acetaminophen 5-325 mg per tablet  Commonly known as: PERCOCET  Take 1 tablet by mouth every 6 (six) hours as needed for Pain.     * oxyCODONE-acetaminophen 5-325 mg per tablet  Commonly known as: PERCOCET  Take 1 tablet by mouth every 6 (six) hours as needed for Pain.     * oxyCODONE-acetaminophen 5-325 mg per tablet  Commonly known as: PERCOCET  Take 1 tablet by mouth every 6 (six) hours as needed for Pain.     PIPERACILLIN-TAZOBACTAM 4.5G/100ML SODIUM CHLORIDE 0.9%-READY TO MIX  Inject 100 mLs (4.5 g total) into the vein every 8 (eight) hours.     SANTYL ointment  Generic drug: collagenase  Apply topically once daily.     sevelamer carbonate 800 mg Tab  Commonly known as: RENVELA  Take 1 tablet (800 mg total) by mouth 3 (three) times daily with meals.     torsemide 20 MG Tab  Commonly known as: DEMADEX  TAKE 2 TABLETS BY MOUTH ONCE DAILY         * This list has 3 medication(s) that are the same as other medications prescribed for you. Read the directions carefully, and ask your doctor or other care provider to review them with you.                SOCIAL HISTORY:     Social History     Socioeconomic History    Marital status: Legally      Spouse  "name: Not on file    Number of children: Not on file    Years of education: Not on file    Highest education level: Not on file   Occupational History    Not on file   Social Needs    Financial resource strain: Not on file    Food insecurity     Worry: Not on file     Inability: Not on file    Transportation needs     Medical: Not on file     Non-medical: Not on file   Tobacco Use    Smoking status: Never Smoker    Smokeless tobacco: Never Used   Substance and Sexual Activity    Alcohol use: No    Drug use: Yes     Types: Marijuana     Comment: occassional    Sexual activity: Not on file   Lifestyle    Physical activity     Days per week: Not on file     Minutes per session: Not on file    Stress: Not on file   Relationships    Social connections     Talks on phone: Not on file     Gets together: Not on file     Attends Mosque service: Not on file     Active member of club or organization: Not on file     Attends meetings of clubs or organizations: Not on file     Relationship status: Not on file   Other Topics Concern    Not on file   Social History Narrative    Not on file       FAMILY HISTORY:     Family History   Problem Relation Age of Onset    Anesthesia problems Neg Hx        REVIEW OF SYSTEMS:   Review of Systems   Constitution: Negative.   HENT: Negative.    Eyes: Negative.    Cardiovascular: Negative.    Respiratory: Negative.    Endocrine: Negative.    Hematologic/Lymphatic: Negative.    Skin: Negative.    Musculoskeletal: Negative.    Gastrointestinal: Negative.    Genitourinary: Negative.    Neurological: Negative.    Psychiatric/Behavioral: Negative.    Allergic/Immunologic: Negative.        A 10 point review of systems was performed and all the pertinent positives have been mentioned. Rest of review of systems was negative.        PHYSICAL EXAM:     Vitals:    09/30/20 0921   BP: 138/86   Pulse: 101    Body mass index is 32.28 kg/m².  Weight: 88 kg (194 lb 0.1 oz)   Height: 5' 5" " (165.1 cm)     Physical Exam   Constitutional: She is oriented to person, place, and time. She appears well-developed and well-nourished.   HENT:   Head: Normocephalic.   Eyes: Pupils are equal, round, and reactive to light.   Neck: Normal range of motion. Neck supple.   Cardiovascular: Normal rate and regular rhythm.   Pulmonary/Chest: Effort normal and breath sounds normal.   Abdominal: Soft. Normal appearance and bowel sounds are normal. There is no abdominal tenderness.   Musculoskeletal: Normal range of motion.   Neurological: She is alert and oriented to person, place, and time.   Skin: Skin is warm.   Psychiatric: She has a normal mood and affect.         DATA:     Laboratory:  CBC:  Recent Labs   Lab 08/22/20  0456 08/23/20  0556 08/24/20  0524   WBC 8.35 7.33 8.09   Hemoglobin 7.2 L 7.4 L 7.4 L   Hematocrit 23.1 L 23.8 L 23.5 L   Platelets 338 379 H 399 H       CHEMISTRIES:  Recent Labs   Lab 08/22/20 0456 08/23/20  0556 08/24/20  0524   Glucose 164 H 167 H 161 H   Sodium 137 137 138   Potassium 3.2 L 3.2 L 3.4 L   BUN, Bld 24 H 21 H 18   Creatinine 1.4 1.5 H 1.5 H   eGFR if  49 A 46 A 46 A   eGFR if non  43 A 39 A 39 A   Calcium 8.7 8.3 L 8.5 L   Magnesium 1.7 1.7 1.7       CARDIAC BIOMARKERS:  Recent Labs   Lab 06/01/18  1045  01/02/20  1701  01/04/20  1004 01/04/20  1510 01/06/20  2218 07/21/20  1151     --  149  --   --   --   --  78   Troponin I  --    < > 0.162 H   < > 0.188 H 0.169 H 0.030 H  --     < > = values in this interval not displayed.       COAGS:  Recent Labs   Lab 08/22/20  0456 08/23/20  0556 08/24/20  0524   INR 0.9 0.9 0.9       LIPIDS/LFTS:  Recent Labs   Lab 12/16/19  0803  01/03/20  0401  03/23/20  2143 07/17/20  1631 08/12/20  1731   Cholesterol 244 H  --  246 H  --   --   --   --    Triglycerides 135  --  115  --   --   --   --    HDL 46  --  41  --   --   --   --    LDL Cholesterol 171.0 H  --  182.0 H  --   --   --   --    Non-HDL  Cholesterol 198  --  205  --   --   --   --    AST  --    < > 10   < > 23 11 16   ALT  --    < > 5 L   < > 16 <5 L 10    < > = values in this interval not displayed.       Hemoglobin A1C   Date Value Ref Range Status   08/26/2020 10.2 (H) 4.0 - 5.6 % Final     Comment:     ADA Screening Guidelines:  5.7-6.4%  Consistent with prediabetes  >or=6.5%  Consistent with diabetes  High levels of fetal hemoglobin interfere with the HbA1C  assay. Heterozygous hemoglobin variants (HbS, HgC, etc)do  not significantly interfere with this assay.   However, presence of multiple variants may affect accuracy.     07/17/2020 >14.0 (H) 4.0 - 5.6 % Final     Comment:     ADA Screening Guidelines:  5.7-6.4%  Consistent with prediabetes  >or=6.5%  Consistent with diabetes  High levels of fetal hemoglobin interfere with the HbA1C  assay. Heterozygous hemoglobin variants (HbS, HgC, etc)do  not significantly interfere with this assay.   However, presence of multiple variants may affect accuracy.     01/03/2020 9.7 (H) 4.0 - 5.6 % Final     Comment:     ADA Screening Guidelines:  5.7-6.4%  Consistent with prediabetes  >or=6.5%  Consistent with diabetes  High levels of fetal hemoglobin interfere with the HbA1C  assay. Heterozygous hemoglobin variants (HbS, HgC, etc)do  not significantly interfere with this assay.   However, presence of multiple variants may affect accuracy.         TSH  Recent Labs   Lab 12/07/19  0132 01/02/20  1701   TSH 1.705 0.562       The ASCVD Risk score ([place] SUSAN Jr., et al., 2013) failed to calculate for the following reasons:    The patient has a prior MI or stroke diagnosis             ASSESSMENT AND PLAN     Patient Active Problem List   Diagnosis    Peripheral artery disease    Peripheral vascular disease    PAD (peripheral artery disease)    Hypokalemia, gastrointestinal losses    Type 2 diabetes mellitus without complication, with long-term current use of insulin    Panic attack as reaction to stress     Malnutrition of moderate degree    Essential hypertension    Vitamin D deficiency    CAROLIN (generalized anxiety disorder)    MDD (major depressive disorder), recurrent episode, moderate    Bereavement    Injury to kidney    Chronic combined systolic and diastolic heart failure    Mixed hyperlipidemia    Dilated cardiomyopathy    Acute coronary syndrome    Acute on chronic combined systolic (congestive) and diastolic (congestive) heart failure    Type 2 diabetes mellitus with hyperglycemia, with long-term current use of insulin    Coronary artery disease involving native coronary artery of native heart without angina pectoris    Contrast dye induced nephropathy    Weakness    S/P CABG x 1    Acute blood loss anemia    Paroxysmal atrial fibrillation    Alteration in skin integrity in adult    Preventive measure    Ischemic cardiomyopathy    Non healing left heel wound    Acute osteomyelitis of left calcaneus    Stage 3 chronic kidney disease    Critical lower limb ischemia    S/P femoral-popliteal bypass surgery    Diabetic ulcer of left heel associated with type 2 diabetes mellitus, with bone involvement without evidence of necrosis    Ischemia of lower extremity           Coronary artery disease status post LIMA to LAD and PCI of circumflex.  July 29, 2020:  Patient underwent PCI of circumflex.  Doing fine.  No complications.  Continue aspirin 81 mg daily indefinitely and Plavix 75 mg daily uninterrupted for at least 1 year and longer if no contraindications.  the RCA  will be medically managed    Stage 3 chronic kidney disease      S/P CABG x 1  Status post LIMA to LAD.  Moderate disease in OM2.  Status post PCI of circumflex    Chronic combined systolic and diastolic heart failure  Currently euvolemic.  EF 50%.    Essential hypertension  Well controlled on current therapy.  Continue antihypertensives and titrate as needed.    Type 2 diabetes mellitus without complication, with  long-term current use of insulin  Management per primary    Peripheral artery disease  Being managed by vascular surgery.  Status post left fem-pop bypass    Postoperative paroxysmal AFib after CABG.  On amiodarone 400 mg daily.  Will decrease to 200 mg daily and follow-up in clinic in 3 months.  If no further episodes of AFib, will discontinue.        Thank you very much for involving me in the care of your patient.  Please do not hesitate to contact me if there are any questions.      Rachel Easley MD, FACC, [facility]  Interventional Cardiologist, [facility].       Follow-up in 3 months    This note was dictated with the help of speech recognition software.  There might be un-intended errors and/or substitutions.

## 2020-10-02 ENCOUNTER — HOSPITAL ENCOUNTER (OUTPATIENT)
Dept: WOUND CARE | Facility: HOSPITAL | Age: 55
Discharge: HOME | End: 2020-10-02
Attending: PODIATRIST
Payer: MEDICAID

## 2020-10-02 ENCOUNTER — HOSPITAL ENCOUNTER (OUTPATIENT)
Dept: CARDIOLOGY | Facility: HOSPITAL | Age: 55
Discharge: HOME | End: 2020-10-02
Attending: SURGERY
Payer: MEDICAID

## 2020-10-02 VITALS
SYSTOLIC BLOOD PRESSURE: 110 MMHG | HEART RATE: 74 BPM | DIASTOLIC BLOOD PRESSURE: 62 MMHG | TEMPERATURE: 98 F | RESPIRATION RATE: 20 BRPM

## 2020-10-02 DIAGNOSIS — M79.89 LEG SWELLING: ICD-10-CM

## 2020-10-02 DIAGNOSIS — L97.426 DIABETIC ULCER OF LEFT HEEL ASSOCIATED WITH TYPE 2 DIABETES MELLITUS, WITH BONE INVOLVEMENT WITHOUT EVIDENCE OF NECROSIS: Primary | ICD-10-CM

## 2020-10-02 DIAGNOSIS — E11.621 DIABETIC ULCER OF LEFT HEEL ASSOCIATED WITH TYPE 2 DIABETES MELLITUS, WITH BONE INVOLVEMENT WITHOUT EVIDENCE OF NECROSIS: Primary | ICD-10-CM

## 2020-10-02 PROCEDURE — 93971 EXTREMITY STUDY: CPT | Mod: 26,LT,, | Performed by: SURGERY

## 2020-10-02 PROCEDURE — 11042 DBRDMT SUBQ TIS 1ST 20SQCM/<: CPT | Mod: ,,, | Performed by: PODIATRIST

## 2020-10-02 PROCEDURE — 93971 EXTREMITY STUDY: CPT | Mod: LT

## 2020-10-02 PROCEDURE — 27201912 HC WOUND CARE DEBRIDEMENT SUPPLIES: Performed by: PODIATRIST

## 2020-10-02 PROCEDURE — 99499 NO LOS: ICD-10-PCS | Mod: ,,, | Performed by: PODIATRIST

## 2020-10-02 PROCEDURE — 93971 CV US DOPPLER VENOUS LEG LEFT (CUPID ONLY): ICD-10-PCS | Mod: 26,LT,, | Performed by: SURGERY

## 2020-10-02 PROCEDURE — 11042 DBRDMT SUBQ TIS 1ST 20SQCM/<: CPT | Performed by: PODIATRIST

## 2020-10-02 PROCEDURE — 11042 DBRDMT SUBQ TIS 1ST 20SQCM/<: CPT

## 2020-10-02 PROCEDURE — 99499 UNLISTED E&M SERVICE: CPT | Mod: ,,, | Performed by: PODIATRIST

## 2020-10-02 PROCEDURE — 11042 WOUND DEBRIDEMENT: ICD-10-PCS | Mod: ,,, | Performed by: PODIATRIST

## 2020-10-02 NOTE — PROGRESS NOTES
[facility]                Progress Note    Subjective:       Patient ID: Suyapa Connelly is a 55 y.o. female.    Chief Complaint: Wound Check    F/u wound care visit. Patient in w/c to exam room, easily transfers to exam chair per self. Wound dressing to left foot intact with moderate amount of serosanguineous drainage noted. Edema 4+ noted to LLE, patient denies pain upon palpation. States that swelling occurs when she walks and moves around a lot. Patient scheduled for venous US. RTC in one week.      Review of Systems   Constitutional: Negative.    Respiratory: Negative.    Cardiovascular: Positive for leg swelling.   Gastrointestinal: Negative.    Genitourinary: Negative.    Musculoskeletal: Positive for arthralgias.   Skin: Positive for wound.   Neurological: Negative.    Psychiatric/Behavioral: Negative.          Objective:        Physical Exam  Constitutional:       Appearance: She is well-developed.      Comments: Oriented to time, place, and person.   Cardiovascular:      Comments: DP and PT pulses are palpable bilaterally. 3 sec capillary refill time and toes and feet are warm to touch proximally .  There is  hair growth on the feet and toes b/l. There is no edema b/l. No spider veins or varicosities present b/l.     Musculoskeletal:      Comments: Equinus noted b/l ankles with < 10 deg DF noted. MMT 5/5 in DF/PF/Inv/Ev resistance with no reproduction of pain in any direction. Passive range of motion of ankle and pedal joints is painless b/l.     Feet:      Right foot:      Skin integrity: No callus or dry skin.      Left foot:      Skin integrity: No callus or dry skin.   Lymphadenopathy:      Comments: Negative lymphadenopathy bilateral popliteal fossa and tarsal tunnel.   Skin:     Comments: See wound description      Neurological:      Mental Status: She is alert.      Comments: Light touch, proprioception, and sharp/dull sensation are all intact  bilaterally. Protective threshold with the Shalimar-Wienstein monofilament is intact bilaterally.    Psychiatric:         Behavior: Behavior is cooperative.         Vitals:    10/02/20 1304   BP: 110/62   Pulse: 74   Resp: 20   Temp: 97.5 °F (36.4 °C)       Assessment:           ICD-10-CM ICD-9-CM   1. Diabetic ulcer of left heel associated with type 2 diabetes mellitus, with bone involvement without evidence of necrosis  E11.621 250.80    L97.426 707.14            Incision/Site 09/08/20 1316 Left Heel (Active)   09/08/20 1316   Present Prior to Hospital Arrival?:    Side: Left   Location: Heel   Orientation:    Incision Type:    Closure Method:    Additional Comments:    Removal Indication and Assessment:    Wound Outcome:    Removal Indications:    Wound Image   10/02/20 1300   Incision WDL ex 10/02/20 1300   Dressing Appearance Intact;Dried drainage 10/02/20 1300   Drainage Amount Moderate 10/02/20 1300   Drainage Characteristics/Odor Serosanguineous 10/02/20 1300   Appearance Pink;Red;Yellow;Slough 10/02/20 1300   Black (%), Wound Tissue Color 0 % 10/02/20 1300   Red (%), Wound Tissue Color 40 % 10/02/20 1300   Yellow (%), Wound Tissue Color 60 % 10/02/20 1300   Periwound Area Macerated 10/02/20 1300   Wound Edges Open 10/02/20 1300   Wound Length (cm) 4.1 cm 10/02/20 1300   Wound Width (cm) 3.5 cm 10/02/20 1300   Wound Depth (cm) 0.7 cm 10/02/20 1300   Wound Volume (cm^3) 10.04 cm^3 10/02/20 1300   Wound Surface Area (cm^2) 14.35 cm^2 10/02/20 1300   Care Cleansed with:;Soap and water;Sterile normal saline 10/02/20 1300   Dressing Applied;Foam;Methylene blue/gentian violet 10/02/20 1300   Off Loading Football dressing;Off loading shoe 10/02/20 1300   Dressing Change Due 10/09/20 10/02/20 1300           Plan:          Pending HBO approval.   Instructed to obtain venous US today, already scheduled.     Debridement: See procedure note     Dressings: per above  Offloading:Foam    Follow-up:Patient is to return to the  clinic in 1 week  for follow-up but should call [facility] immediately if any signs of infection, such as fever, chills, sweats, increased redness or pain.    Short-term goals include maintaining good offloading and minimizing bioburden, promoting granulation and epithelialization to healing.  Long-term goals include keeping the wound healed by good offloading and medical management under the direction of internist.          Orders Placed This Encounter   Procedures    Change dressing     Clean with saline. Gentian violet periwound. Apply santyl to wound with hydrofera blue transfer to wound. Cover with lindsay foam x 2, cast padding x 3, and stockinet.        Follow up in about 1 week (around 10/9/2020) for wound care.

## 2020-10-05 ENCOUNTER — OFFICE VISIT (OUTPATIENT)
Dept: VASCULAR SURGERY | Facility: CLINIC | Age: 55
End: 2020-10-05
Attending: OPHTHALMOLOGY
Payer: MEDICAID

## 2020-10-05 ENCOUNTER — HOSPITAL ENCOUNTER (OUTPATIENT)
Dept: WOUND CARE | Facility: HOSPITAL | Age: 55
Discharge: HOME | End: 2020-10-05
Attending: FAMILY MEDICINE
Payer: MEDICAID

## 2020-10-05 VITALS
SYSTOLIC BLOOD PRESSURE: 182 MMHG | TEMPERATURE: 98 F | DIASTOLIC BLOOD PRESSURE: 83 MMHG | RESPIRATION RATE: 20 BRPM | HEART RATE: 83 BPM

## 2020-10-05 VITALS
HEIGHT: 65 IN | SYSTOLIC BLOOD PRESSURE: 130 MMHG | BODY MASS INDEX: 32.54 KG/M2 | WEIGHT: 195.31 LBS | DIASTOLIC BLOOD PRESSURE: 82 MMHG

## 2020-10-05 DIAGNOSIS — Z95.828 S/P FEMORAL-POPLITEAL BYPASS SURGERY: Primary | ICD-10-CM

## 2020-10-05 DIAGNOSIS — I70.229 CRITICAL LOWER LIMB ISCHEMIA: ICD-10-CM

## 2020-10-05 DIAGNOSIS — E11.621 DIABETIC ULCER OF LEFT HEEL ASSOCIATED WITH TYPE 2 DIABETES MELLITUS, WITH BONE INVOLVEMENT WITHOUT EVIDENCE OF NECROSIS: ICD-10-CM

## 2020-10-05 DIAGNOSIS — M86.172 ACUTE OSTEOMYELITIS OF LEFT CALCANEUS: Primary | ICD-10-CM

## 2020-10-05 DIAGNOSIS — L97.426 DIABETIC ULCER OF LEFT HEEL ASSOCIATED WITH TYPE 2 DIABETES MELLITUS, WITH BONE INVOLVEMENT WITHOUT EVIDENCE OF NECROSIS: ICD-10-CM

## 2020-10-05 LAB
POCT GLUCOSE: 329 MG/DL (ref 70–110)
POCT GLUCOSE: 337 MG/DL (ref 70–110)

## 2020-10-05 PROCEDURE — 99024 POSTOP FOLLOW-UP VISIT: CPT | Mod: ,,, | Performed by: SURGERY

## 2020-10-05 PROCEDURE — 99999 PR PBB SHADOW E&M-EST. PATIENT-LVL V: CPT | Mod: PBBFAC,,, | Performed by: SURGERY

## 2020-10-05 PROCEDURE — 82962 GLUCOSE BLOOD TEST: CPT | Performed by: FAMILY MEDICINE

## 2020-10-05 PROCEDURE — 99215 OFFICE O/P EST HI 40 MIN: CPT | Mod: PBBFAC | Performed by: SURGERY

## 2020-10-05 PROCEDURE — 99999 PR PBB SHADOW E&M-EST. PATIENT-LVL V: ICD-10-PCS | Mod: PBBFAC,,, | Performed by: SURGERY

## 2020-10-05 PROCEDURE — 99024 PR POST-OP FOLLOW-UP VISIT: ICD-10-PCS | Mod: ,,, | Performed by: SURGERY

## 2020-10-05 NOTE — LETTER
October 5, 2020        [place]  [address] [facility] [address]             [facility]  [address]  [facility] [address]  Phone: [phone]  Fax: [phone]   Patient: Suyapa Connelly   MR Number: [id number]   YOB: 1965   Date of Visit: 10/5/2020       Dear Dr. Rahman:    Thank you for referring Suyapa Connelly to me for evaluation. Below are the relevant portions of my assessment and plan of care.            If you have questions, please do not hesitate to call me. I look forward to following Suyapa along with you.    Sincerely,      Teddy Huber MD           CC  No Recipients

## 2020-10-05 NOTE — PROGRESS NOTES
Teddy Huber MD [facility]                         10/05/2020    HPI:  Suyapa Connelly is a 55 y.o. female with   Patient Active Problem List   Diagnosis    Peripheral artery disease    Peripheral vascular disease    PAD (peripheral artery disease)    Hypokalemia, gastrointestinal losses    Type 2 diabetes mellitus without complication, with long-term current use of insulin    Panic attack as reaction to stress    Malnutrition of moderate degree    Essential hypertension    Vitamin D deficiency    CAROLIN (generalized anxiety disorder)    MDD (major depressive disorder), recurrent episode, moderate    Bereavement    Injury to kidney    Chronic combined systolic and diastolic heart failure    Mixed hyperlipidemia    Dilated cardiomyopathy    Acute coronary syndrome    Acute on chronic combined systolic (congestive) and diastolic (congestive) heart failure    Type 2 diabetes mellitus with hyperglycemia, with long-term current use of insulin    Coronary artery disease involving native coronary artery of native heart without angina pectoris    Contrast dye induced nephropathy    Weakness    S/P CABG x 1    Acute blood loss anemia    Paroxysmal atrial fibrillation    Alteration in skin integrity in adult    Preventive measure    Ischemic cardiomyopathy    Non healing left heel wound    Acute osteomyelitis of left calcaneus    Stage 3 chronic kidney disease    Critical lower limb ischemia    S/P femoral-popliteal bypass surgery    Diabetic ulcer of left heel associated with type 2 diabetes mellitus, with bone involvement without evidence of necrosis    Ischemia of lower extremity    being managed by PCP and specialists who is here today for evaluation of PVD.  S/p L fem-BK pop prosthetic bypass.  Doing well.  Patient has no complaints of claudication.  She does have c/o LLE edema. Patient states location is LLE occurring for a few  weeks.  Associated signs and symptoms include decreased ROM although she is ambulating well and had her L foot wound debrided recently with good outcomes.  Quality is heavy and severity is 5/10.  Symptoms began a few weeks ago.  Alleviating factors include elevation.  Worsening factors include dependency.  No rest pain.  + tissue loss.  Patient is diabetic.  Is not on Pletal.  + previous lower extremity interventions.    no MI  no Stroke  Tobacco use: no  Daily Aspirin: yes  Anticoagulation: no    10/2020:  No new complaints.  States LLE edema improved.  Receiving HBO.    Past Medical History:   Diagnosis Date    Anxiety     Depression     Diabetes mellitus     type 2    Diabetes mellitus, type 2     GERD (gastroesophageal reflux disease)     Hyperlipemia     Hypertension     Myocardial infarction 2010    minor-caused by stress per pt.    Vaginal delivery     x1     Past Surgical History:   Procedure Laterality Date    Angiogram - Right Extremity Right 7/9/15    angiogram-left leg  10/6/15    CATHETERIZATION OF BOTH LEFT AND RIGHT HEART N/A 12/18/2019    Procedure: CATHETERIZATION, HEART, BOTH LEFT AND RIGHT;  Surgeon: Que Fernando III, MD;  Location: [facility] CATH LAB;  Service: Cardiology;  Laterality: N/A;    CORONARY ANGIOGRAPHY N/A 12/18/2019    Procedure: ANGIOGRAM, CORONARY ARTERY;  Surgeon: Que Fernando III, MD;  Location: [facility] CATH LAB;  Service: Cardiology;  Laterality: N/A;    CORONARY ANGIOGRAPHY INCLUDING BYPASS GRAFTS WITH CATHETERIZATION OF LEFT HEART N/A 7/28/2020    Procedure: ANGIOGRAM, CORONARY, INCLUDING BYPASS GRAFT, WITH LEFT HEART CATHETERIZATION, 9 am;  Surgeon: Rachel Easley MD;  Location: [facility] CATH LAB;  Service: Cardiology;  Laterality: N/A;    CORONARY ARTERY BYPASS GRAFT (CABG) N/A 1/14/2020    Procedure: CORONARY ARTERY BYPASS GRAFT (CABG) x 1     Off Pump;  Surgeon: Huang Altamirano MD;  Location: [facility] OR [address] [place] [address];  Service: Cardiovascular;  Laterality: N/A;     CREATION OF FEMOROPOPLITEAL ARTERIAL BYPASS USING GRAFT Left 8/18/2020    Procedure: CREATION, BYPASS, ARTERIAL, FEMORAL TO POPLITEAL, USING GRAFT, LEFT LOWER EXTREMITY;  Surgeon: Teddy Huber MD;  Location: [facility] OR;  Service: Vascular;  Laterality: Left;  REQUEST 7:15 A.M. START----COVID NEGATIVE ON 8/17  1ST CASE STARTE PER LEANA ON 8/7/2020 @ 942AM-LO  RN PREOP 8/12/2020   T/S-----CLEARED BY CARDS-------PENDING INSURANCE    DEBRIDEMENT OF FOOT Left 9/8/2020    Procedure: DEBRIDEMENT, FOOT;  Surgeon: Rosio Mayes DPM;  Location: [facility] OR;  Service: Podiatry;  Laterality: Left;  request neoxx .   RN Pre Op 9-4-2020, Covid negative on 9/5/20. C A    INSERTION OF TUNNELED CENTRAL VENOUS HEMODIALYSIS CATHETER N/A 1/27/2020    Procedure: Insertion, Catheter, Central Venous, Hemodialysis;  Surgeon: ESTEBAN Gomez III, MD;  Location: [facility] CATH LAB;  Service: Peripheral Vascular;  Laterality: N/A;     Family History   Problem Relation Age of Onset    Anesthesia problems Neg Hx      Social History     Socioeconomic History    Marital status: Legally      Spouse name: Not on file    Number of children: Not on file    Years of education: Not on file    Highest education level: Not on file   Occupational History    Not on file   Social Needs    Financial resource strain: Not on file    Food insecurity     Worry: Not on file     Inability: Not on file    Transportation needs     Medical: Not on file     Non-medical: Not on file   Tobacco Use    Smoking status: Never Smoker    Smokeless tobacco: Never Used   Substance and Sexual Activity    Alcohol use: No    Drug use: Yes     Types: Marijuana     Comment: occassional    Sexual activity: Not on file   Lifestyle    Physical activity     Days per week: Not on file     Minutes per session: Not on file    Stress: Not on file   Relationships    Social connections     Talks on phone: Not on file     Gets together: Not on file     Attends  Rastafarian service: Not on file     Active member of club or organization: Not on file     Attends meetings of clubs or organizations: Not on file     Relationship status: Not on file   Other Topics Concern    Not on file   Social History Narrative    Not on file       Current Outpatient Medications:     allopurinoL (ZYLOPRIM) 100 MG tablet, Take 1 tablet (100 mg total) by mouth once daily., Disp: 30 tablet, Rfl: 5    amiodarone (PACERONE) 200 MG Tab, Take 1 tablet (200 mg total) by mouth once daily., Disp: 90 tablet, Rfl: 3    aspirin 81 MG Chew, Take 1 tablet (81 mg total) by mouth once daily., Disp: 90 tablet, Rfl: 3    atorvastatin (LIPITOR) 80 MG tablet, Take 1 tablet (80 mg total) by mouth once daily., Disp: 30 tablet, Rfl: 5    carvediloL (COREG) 6.25 MG tablet, Take 6.25 mg by mouth once daily. Pt thinks she take BID. Unsure., Disp: , Rfl:     clopidogreL (PLAVIX) 75 mg tablet, Take 1 tablet (75 mg total) by mouth once daily., Disp: 30 tablet, Rfl: 11    collagenase (SANTYL) ointment, Apply topically once daily., Disp: 250 g, Rfl: 0    gabapentin (NEURONTIN) 300 MG capsule, Take 1 capsule (300 mg total) by mouth 3 (three) times daily., Disp: 90 capsule, Rfl: 5    hydrALAZINE (APRESOLINE) 25 MG tablet, Take 1 tablet (25 mg total) by mouth every 8 (eight) hours., Disp: 90 tablet, Rfl: 5    insulin aspart U-100 (NOVOLOG) 100 unit/mL injection, Inject 9 Units into the skin 3 (three) times daily with meals., Disp: 10 mL, Rfl: 5    insulin glargine 100 units/mL (3mL) SubQ pen, Inject 10 Units into the skin every evening. (Patient taking differently: Inject 10 Units into the skin every evening. Lantus), Disp: 9 mL, Rfl: 1    metoprolol succinate (TOPROL-XL) 50 MG 24 hr tablet, , Disp: , Rfl:     multivitamin (THERAGRAN) per tablet, Take 1 tablet by mouth once daily., Disp: , Rfl:     oxyCODONE-acetaminophen (PERCOCET) 5-325 mg per tablet, Take 1 tablet by mouth every 6 (six) hours as needed for  Pain., Disp: 28 tablet, Rfl: 0    sevelamer carbonate (RENVELA) 800 mg Tab, Take 1 tablet (800 mg total) by mouth 3 (three) times daily with meals., Disp: 90 tablet, Rfl: 11    acetaminophen (TYLENOL) 500 MG tablet, Take 1,000 mg by mouth daily as needed for Pain., Disp: , Rfl:     blood sugar diagnostic (ONETOUCH ULTRA BLUE TEST STRIP) Strp, Use to test blood glucose twice daily (Patient not taking: Reported on 9/14/2020), Disp: 100 each, Rfl: 11    escitalopram oxalate (LEXAPRO) 10 MG tablet, Take 1 tablet (10 mg total) by mouth once daily. (Patient not taking: Reported on 10/5/2020), Disp: 30 tablet, Rfl: 5    lancets 30 gauge Misc, use to test blood glucose 2 (two) times daily with meals. (Patient not taking: Reported on 9/14/2020), Disp: 100 each, Rfl: 11    ondansetron (ZOFRAN-ODT) 4 MG TbDL, Take 1 tablet (4 mg total) by mouth every 12 (twelve) hours as needed (vomiting). (Patient not taking: Reported on 9/14/2020), Disp: 10 tablet, Rfl: 0    oxyCODONE-acetaminophen (PERCOCET) 5-325 mg per tablet, Take 1 tablet by mouth every 6 (six) hours as needed for Pain. (Patient not taking: Reported on 9/14/2020), Disp: 25 tablet, Rfl: 0    oxyCODONE-acetaminophen (PERCOCET) 5-325 mg per tablet, Take 1 tablet by mouth every 6 (six) hours as needed for Pain. (Patient not taking: Reported on 9/14/2020), Disp: 28 tablet, Rfl: 0    piperacillin sodium/tazobactam (PIPERACILLIN-TAZOBACTAM 4.5G/100ML SODIUM CHLORIDE 0.9%-READY TO MIX), Inject 100 mLs (4.5 g total) into the vein every 8 (eight) hours. (Patient not taking: Reported on 9/30/2020), Disp:  , Rfl:     torsemide (DEMADEX) 20 MG Tab, TAKE 2 TABLETS BY MOUTH ONCE DAILY (Patient not taking: Reported on 9/30/2020), Disp: 180 tablet, Rfl: 3    REVIEW OF SYSTEMS:  General: No fevers or chills; ENT: No sore throat; Allergy and Immunology: no persistent infections; Hematological and Lymphatic: No history of bleeding or easy bruising; Endocrine: negative;  Respiratory: no cough, shortness of breath, or wheezing; Cardiovascular: no chest pain or dyspnea on exertion; no claudication, no rest pain; Gastrointestinal: no abdominal pain/back, change in bowel habits, or bloody stools; Genito-Urinary: no dysuria, trouble voiding, or hematuria; Musculoskeletal: negative, + wound; Neurological: no TIA or stroke symptoms; Psychiatric: no nervousness, anxiety or depression.    PHYSICAL EXAM:                General appearance:  Alert, well-appearing, and in no distress.  Oriented to person, place, and time                    Neurological: Normal speech, no focal findings noted; CN II - XII grossly intact. RLE with sensation to light touch, LLE with sensation to light touch.            Musculoskeletal: Digits/nail without cyanosis/clubbing.  Strength 5/5 BLE.                    Neck: Supple, no significant adenopathy, no carotid bruit can be auscultated                  Chest:  Clear to auscultation, no wheezes, rales or rhonchi, symmetric air entry. No use of accessory muscles               Cardiac: Normal rate and regular rhythm, S1 and S2 normal            Abdomen: Soft, nontender, nondistended, no masses or organomegaly, no hernia     No rebound tenderness noted; bowel sounds normal     Pulsatile aortic mass is non palpable.     No groin adenopathy      Extremities:2+ R femoral pulse, 2+ L femoral pulse     1+ R popliteal pulse, non palp L popliteal pulse     1+ R PT pulse, 1+ L PT pulse     1+ R DP pulse, 1+ L DP pulse     no RLE edema, 2+ LLE edema    Skin: RLE without tissue loss; LLE with L heel tissue loss; L groin inc nearly fully healed, L leg inc fully healed    LAB RESULTS:  No results found for: CBC  Lab Results   Component Value Date    LABPROT 10.3 08/24/2020    INR 0.9 08/24/2020     Lab Results   Component Value Date     08/24/2020    K 3.4 (L) 08/24/2020     08/24/2020    CO2 20 (L) 08/24/2020     (H) 08/24/2020    BUN 18 08/24/2020     CREATININE 1.5 (H) 08/24/2020    CALCIUM 8.5 (L) 08/24/2020    ANIONGAP 11 08/24/2020    EGFRNONAA 39 (A) 08/24/2020     Lab Results   Component Value Date    WBC 8.09 08/24/2020    RBC 2.35 (L) 08/24/2020    HGB 7.4 (L) 08/24/2020    HCT 23.5 (L) 08/24/2020     (H) 08/24/2020    MCH 31.5 (H) 08/24/2020    MCHC 31.5 (L) 08/24/2020    RDW 14.1 08/24/2020     (H) 08/24/2020    MPV 10.7 08/24/2020    GRAN 4.6 08/24/2020    GRAN 56.2 08/24/2020    LYMPH 1.9 08/24/2020    LYMPH 24.0 08/24/2020    MONO 1.0 08/24/2020    MONO 11.9 08/24/2020    EOS 0.5 08/24/2020    BASO 0.06 08/24/2020    EOSINOPHIL 6.6 08/24/2020    BASOPHIL 0.7 08/24/2020    DIFFMETHOD Automated 08/24/2020     .  Lab Results   Component Value Date    HGBA1C 10.2 (H) 08/26/2020       IMAGING:  All pertinent imaging has been reviewed and interpreted independently.    ATTILA / TP 9/2020:  ATTILA NA/1.8, TP 29/128    LLE arterial US with patent bypass and 50% stenosis of distal anastomosis    10/2020:  No LLE DVT, simple fluid collection L groin without evidence of flow or rind    IMP/PLAN:  55 y.o. female with   Patient Active Problem List   Diagnosis    Peripheral artery disease    Peripheral vascular disease    PAD (peripheral artery disease)    Hypokalemia, gastrointestinal losses    Type 2 diabetes mellitus without complication, with long-term current use of insulin    Panic attack as reaction to stress    Malnutrition of moderate degree    Essential hypertension    Vitamin D deficiency    CAROLIN (generalized anxiety disorder)    MDD (major depressive disorder), recurrent episode, moderate    Bereavement    Injury to kidney    Chronic combined systolic and diastolic heart failure    Mixed hyperlipidemia    Dilated cardiomyopathy    Acute coronary syndrome    Acute on chronic combined systolic (congestive) and diastolic (congestive) heart failure    Type 2 diabetes mellitus with hyperglycemia, with long-term current use of  insulin    Coronary artery disease involving native coronary artery of native heart without angina pectoris    Contrast dye induced nephropathy    Weakness    S/P CABG x 1    Acute blood loss anemia    Paroxysmal atrial fibrillation    Alteration in skin integrity in adult    Preventive measure    Ischemic cardiomyopathy    Non healing left heel wound    Acute osteomyelitis of left calcaneus    Stage 3 chronic kidney disease    Critical lower limb ischemia    S/P femoral-popliteal bypass surgery    Diabetic ulcer of left heel associated with type 2 diabetes mellitus, with bone involvement without evidence of necrosis    Ischemia of lower extremity    being managed by PCP and specialists who is here today for evaluation of PVD.    -s/p L fem-pop prosthetic bypass 8/18/20, recovering well, leg staples removed today, groin staples removed. - keep inc clean/dry, cont daily ASA  -Cont wound care and offloading shoe  -Exercise  -Heart healthy lifestyle  -RTC 3 weeks for wound check    I spent 12 minutes evaluating this patient and greater than 50% of the time was spent counseling, coordinator care and discussing the plan of care.  All questions were answered and patient stated understanding with agreement with the above treatment plan.    Teddy Huber MD [facility]  Vascular and Endovascular Surgery

## 2020-10-05 NOTE — PROGRESS NOTES
Arrived awake and alert. ABC's intact. Vitals as above. Glucose was 337mg/dl. Pt related she ate cake and ice cream last night and this morning she ate half of a sausage biscuit. She took her insulin this am but takes her blood pressure medication last night.  notified. Pt wasn't able to dive today because of her glucose levels and mara blood pressure. Follow up tomorrow for HBO.        ICD-10-CM ICD-9-CM   1. Acute osteomyelitis of left calcaneus  M86.172 730.07   2. Diabetic ulcer of left heel associated with type 2 diabetes mellitus, with bone involvement without evidence of necrosis  E11.621 250.80    L97.426 707.14

## 2020-10-05 NOTE — PATIENT INSTRUCTIONS
Wound Care  Taking proper care of your wound will help it heal. Your healthcare provider may show you how to clean and dress the wound. He or she will also explain how to tell if the wound is healing normally. If you are unsure of how to take care of the wound, be sure to clarify what dressing to use and how often you should change the bandages. Here are the basic steps.     A wound that's not healing normally may be dark in color or have white streaks.   Wash your hands  Tips for washing your hands include:  · Use liquid soap and lather for 2 minutes. Scrub between your fingers and under your nails.  · Rinse with warm water, keeping your fingers pointing down.  · Use a paper towel to dry your hands and to turn off the faucet.  Remove the used dressing  Here are suggestions for removing the dressing:  · If dressing changes cause you pain, be sure to take your pain medicine as prescribed by your healthcare provider 30 minutes before dressing changes.  · Set up your supplies.  · Put on disposable gloves if youre dressing a wound for someone else or your wound is infected.  · Loosen the tape by pulling gently toward the wound.  · Gently take off the old dressing. If the dressing is stuck to the wound, moisten it with saline (if available) or clean water.  · If you have a drain or tube in the wound, be careful not to pull on it.  · Remove the dressing 1 layer at a time and put it in a plastic bag.  · Remove your gloves.  Inspect and dress the wound  Check the wound carefully:  · Each time you change the dressing, inspect the wound carefully to be sure its healing normally by making sure your wound appears to be pink and moist, and is free of infection.    · Wash your hands again. Put on a new pair of gloves.  · Clean and dress the wound as directed by your healthcare provider or nurse. Do not put anything in the wound that is not prescribed or directed by your healthcare provider. If you have a drain or tube, be  careful not to pull on it. Make sure to secure the drain or tube as well.  · Put all supplies in a plastic bag. Seal the bag and put it in the trash.  · Be sure to wash your hands again.  Call your healthcare provider  Call your healthcare provider if you see any of the following signs of a problem:  · Bleeding that soaks the dressing  · Pink fluid weeping from the wound  · Increased drainage or drainage that is yellow, yellow-green, or foul-smelling  · Increased swelling or pain, or redness or swelling in the skin around the wound  · A change in the color of the wound, or if streaks develop in a direction away from the wound  · The area between any stitches opens up  · An increase in the size of the wound  · A fever of 100.4°F (38°C) or higher, or as directed by your healthcare provider  · Chills, increased fatigue, or a loss of appetite      Date Last Reviewed: 7/30/2015  © 4223-7521 [employer]. [address] [place] [address], [place] [address]. All rights reserved. This information is not intended as a substitute for professional medical care. Always follow your healthcare professional's instructions.          Peripheral Artery Disease (PAD)     Peripheral artery disease (PAD) occurs when the arteries that carry blood to the limbs are narrowed or blocked. This is usually due to a buildup of a fatty substance called plaque in the walls of the arteries.  PAD most often affects the arteries in the legs. When these arteries are narrowed or blocked, blood flow to the legs is reduced. This can cause leg and foot pain and other symptoms. If severe enough, reduced blood flow to the legs can lead to tissue death (gangrene) and the loss of a toe, foot, or leg. Having PAD also makes it more likely that arteries in other body areas are blocked. For instance, arteries that carry blood to the heart or brain may be affected. This raises the chances of heart attack and stroke.  Risk factors  Certain factors can make PAD  more likely. They include:  · Smoking  · Diabetes  · High blood pressure  · Unhealthy cholesterol levels  · Obesity  · Inactive lifestyle  · Older age  · Family history of PAD  Symptoms  Many people with PAD have no symptoms. If symptoms do occur, they can include:  · Pain in the muscles of the calves, thighs or hips that gets worse with activity and better with rest (intermittent claudication)  · Achy, tired, or heavy feeling in the legs  · Weakness, numbness, tingling, or loss of feeling in the legs  · Changes in skin color of the legs  · Sores on the legs and feet  · Cold leg, feet, or toes  · Pain the feet or toes even when lying down (rest pain)  Home care  PAD is a chronic (lifelong) condition. Treatment is focused on managing your condition and lowering your health risks. This may include doing the following:  · If you smoke, quit. This helps prevent further damage to your arteries and lowers your health risks. Ask your provider about medicines or products that can help you quit smoking. Also consider joining a stop-smoking program or support group.  · Be more active. This helps you lose weight and manage problems such as high blood pressure and unhealthy cholesterol levels. Start a walking program if advised to by your provider. Your provider may also help you form a safe exercise program that is right for your needs.  · Make healthy eating changes. This includes eating less fat, salt, and sugar.  · Take medicines for high blood pressure, unhealthy cholesterol levels, and diabetes as directed.  · Have your blood pressure and cholesterol levels checked as often as directed.  · If you have diabetes, try to keep your blood sugar well controlled. Test your blood sugar as directed.  · If you are overweight, talk to your provider about forming a weight-loss plan.  · Watch for cuts, scrapes, or open sores on your feet. Poor blood flow to the feet may slow healing and increase the risk of infection from these  problems.   Follow-up care  Follow up with your healthcare provider, or as advised. If imaging tests such as ultrasound were done, they will be reviewed by a doctor. You will be told the results and any new findings that may affect your care.  When to seek medical advice   Call your healthcare provider right away if any of these occur:  · Sudden severe pain in the legs or feet  · Sudden cold, pale or blue color in the legs or feet  · Weakness or numbness in the legs or feet that worsens  · Any sore or wound in the legs or feet that wont heal  · Weak pulse in your legs or feet  Know the Signs of Heart Attack and Stroke  People with PAD are at high risk for heart attack and stroke. Knowing the signs of these problems can help you protect your health and get help when you need it. Call 911 right away if you have any of the following:  Signs of a Heart Attack  · Chest discomfort, such as pain, aching, tightness, or pressure that lasts more than a few minutes, or that comes and goes  · Pain or discomfort in the arms, back, shoulders, neck, or jaw  · Shortness of breath  · Sweating (often a cold, clammy sweat)  · Nausea  · Lightheadedness  Signs of a Stroke  · Sudden numbness or weakness of the face, arms, or legs, especially on one side  · Sudden confusion or trouble speaking or understanding  · Sudden trouble seeing in one or both eyes  · Sudden trouble walking, dizziness, or loss of balance  · Sudden, severe headache with no known cause   Date Last Reviewed: 9/21/2015  © 4237-6049 [employer]. [address] [place] [address], [place] [address]. All rights reserved. This information is not intended as a substitute for professional medical care. Always follow your healthcare professional's instructions.

## 2020-10-06 ENCOUNTER — HOSPITAL ENCOUNTER (OUTPATIENT)
Dept: WOUND CARE | Facility: HOSPITAL | Age: 55
Discharge: HOME | End: 2020-10-06
Attending: FAMILY MEDICINE
Payer: MEDICAID

## 2020-10-06 DIAGNOSIS — L97.426 DIABETIC ULCER OF LEFT HEEL ASSOCIATED WITH TYPE 2 DIABETES MELLITUS, WITH BONE INVOLVEMENT WITHOUT EVIDENCE OF NECROSIS: Primary | ICD-10-CM

## 2020-10-06 DIAGNOSIS — M86.172 ACUTE OSTEOMYELITIS OF LEFT CALCANEUS: ICD-10-CM

## 2020-10-06 DIAGNOSIS — E11.621 DIABETIC ULCER OF LEFT HEEL ASSOCIATED WITH TYPE 2 DIABETES MELLITUS, WITH BONE INVOLVEMENT WITHOUT EVIDENCE OF NECROSIS: Primary | ICD-10-CM

## 2020-10-06 LAB
POCT GLUCOSE: 140 MG/DL (ref 70–110)
POCT GLUCOSE: 253 MG/DL (ref 70–110)

## 2020-10-06 PROCEDURE — 99183 PR HYPERBARIC OXYGEN THERAPY ATTENDANCE/SUPERVISION, PER SESSION: ICD-10-PCS | Mod: ,,, | Performed by: FAMILY MEDICINE

## 2020-10-06 PROCEDURE — 99183 HYPERBARIC OXYGEN THERAPY: CPT | Mod: ,,, | Performed by: FAMILY MEDICINE

## 2020-10-06 PROCEDURE — 82962 GLUCOSE BLOOD TEST: CPT | Performed by: FAMILY MEDICINE

## 2020-10-06 NOTE — PROGRESS NOTES
10/06/20 1304   Hyperbaric Pre-Inspection   Mattress cleaned prior to treatment Yes   2 Patient Identifiers Verified Yes   Consent Obtained Yes   Cotton Gown Yes   Patient voided Yes   Drainage Bags Emptied Not applicable   Patient Pregnant No   Glasses, Jewelry, Makeup, etc. Removed Yes   Dentures, Hearing Aid, Prosthetic Devices Removed Yes   Touch hair to check for hair spray Yes   Cold/Flu Symptoms No   Diabetic Patient Eaten Yes   Medications Given Not applicable   Fears and apprehensions verbalized Yes   Ground Wire Secured Yes   HBO Treatment Course Details   Ordering Provider Fara KAY   Indications Diabetic wounds of lower extremities   HBO Treatment Start Date 10/06/20   HBO Treatment Details   Treatment Number 1   Inpatient Visit No   Total Treatment Length Calc (minutes) 98   Chamber Type Monoplace   Chamber # 2   HBO Dive Log # 547   Treatment Protocol 2.0 ANAND x 90 minutes w/100% oxygen and no air break   Treatment Details   Dive Rate Down 1.0 psi/minute   Dive Rate Up 2.0 psi/minute   Compress Begins 1 ANAND   Clock Time 0943   Tx Pressure Reached 2 ANAND   Clock Time 0958   Decompress Begins 2 ANAND   Clock Time 1113   Decompress Ends 1 ANAND   Clock Time 1121   Pre HBO Vital Signs   BP (!) 154/70   Pulse 70   Resp 20   Temp 97.3 °F (36.3 °C)   Blood Glucose 253   Glucose Meter # wc   Pain Level 0   Post HBO Vital Signs   BP (!) 194/93  (recheck in right arm 171/77)   Pulse 76   Resp 18   Blood Glucose 140   Glucose Meter # wc   Pain Level 0   Ear Evaluation   Left Teed Scale Pre Grade 0   Left Teed Scale Post Grade 0   Right Teed Scale Pre Grade 0   Right Teed Scale Post Grade 0       Arrived awake and alert. ABC's intact. Vitals as above. Cleared for treatment by Catherine KAY Tolerated treatment without complaints. Cleared from chamber without incidents. Follow up tomorrow for HBO.       ICD-10-CM ICD-9-CM   1. Diabetic ulcer of left heel associated with type 2 diabetes mellitus, with bone  involvement without evidence of necrosis  E11.621 250.80    L97.426 707.14   2. Acute osteomyelitis of left calcaneus  M86.172 730.07        Physician Supervision  I provided direct supervision and was immediately available to furnish assistance and direction throughout the performance of the procedure.    Emergency Response Team  A trained emergency response team and emergency services were available throughout procedure.

## 2020-10-07 ENCOUNTER — HOSPITAL ENCOUNTER (OUTPATIENT)
Dept: WOUND CARE | Facility: HOSPITAL | Age: 55
Discharge: HOME | End: 2020-10-07
Attending: FAMILY MEDICINE
Payer: MEDICAID

## 2020-10-07 VITALS
TEMPERATURE: 98 F | RESPIRATION RATE: 18 BRPM | DIASTOLIC BLOOD PRESSURE: 96 MMHG | HEART RATE: 80 BPM | SYSTOLIC BLOOD PRESSURE: 184 MMHG

## 2020-10-07 DIAGNOSIS — L97.426 DIABETIC ULCER OF LEFT HEEL ASSOCIATED WITH TYPE 2 DIABETES MELLITUS, WITH BONE INVOLVEMENT WITHOUT EVIDENCE OF NECROSIS: Primary | ICD-10-CM

## 2020-10-07 DIAGNOSIS — E11.621 DIABETIC ULCER OF LEFT HEEL ASSOCIATED WITH TYPE 2 DIABETES MELLITUS, WITH BONE INVOLVEMENT WITHOUT EVIDENCE OF NECROSIS: ICD-10-CM

## 2020-10-07 DIAGNOSIS — E11.621 DIABETIC ULCER OF LEFT HEEL ASSOCIATED WITH TYPE 2 DIABETES MELLITUS, WITH BONE INVOLVEMENT WITHOUT EVIDENCE OF NECROSIS: Primary | ICD-10-CM

## 2020-10-07 DIAGNOSIS — M86.172 ACUTE OSTEOMYELITIS OF LEFT CALCANEUS: Primary | ICD-10-CM

## 2020-10-07 DIAGNOSIS — L97.426 DIABETIC ULCER OF LEFT HEEL ASSOCIATED WITH TYPE 2 DIABETES MELLITUS, WITH BONE INVOLVEMENT WITHOUT EVIDENCE OF NECROSIS: ICD-10-CM

## 2020-10-07 DIAGNOSIS — M86.172 ACUTE OSTEOMYELITIS OF LEFT CALCANEUS: ICD-10-CM

## 2020-10-07 LAB
POCT GLUCOSE: 206 MG/DL (ref 70–110)
POCT GLUCOSE: 85 MG/DL (ref 70–110)

## 2020-10-07 PROCEDURE — 99214 OFFICE O/P EST MOD 30 MIN: CPT | Mod: 25,,, | Performed by: FAMILY MEDICINE

## 2020-10-07 PROCEDURE — 11042 DBRDMT SUBQ TIS 1ST 20SQCM/<: CPT | Mod: ,,, | Performed by: FAMILY MEDICINE

## 2020-10-07 PROCEDURE — 11042 DEBRIDEMENT: ICD-10-PCS | Mod: ,,, | Performed by: FAMILY MEDICINE

## 2020-10-07 PROCEDURE — 27201912 HC WOUND CARE DEBRIDEMENT SUPPLIES: Performed by: FAMILY MEDICINE

## 2020-10-07 PROCEDURE — 82962 GLUCOSE BLOOD TEST: CPT | Performed by: FAMILY MEDICINE

## 2020-10-07 PROCEDURE — 99183 HYPERBARIC OXYGEN THERAPY: CPT | Mod: ,,, | Performed by: FAMILY MEDICINE

## 2020-10-07 PROCEDURE — 99214 PR OFFICE/OUTPT VISIT, EST, LEVL IV, 30-39 MIN: ICD-10-PCS | Mod: 25,,, | Performed by: FAMILY MEDICINE

## 2020-10-07 PROCEDURE — 99183 PR HYPERBARIC OXYGEN THERAPY ATTENDANCE/SUPERVISION, PER SESSION: ICD-10-PCS | Mod: ,,, | Performed by: FAMILY MEDICINE

## 2020-10-07 PROCEDURE — 11042 DBRDMT SUBQ TIS 1ST 20SQCM/<: CPT | Performed by: FAMILY MEDICINE

## 2020-10-07 RX ORDER — OXYCODONE AND ACETAMINOPHEN 5; 325 MG/1; MG/1
1 TABLET ORAL EVERY 6 HOURS PRN
Qty: 28 TABLET | Refills: 0 | Status: SHIPPED | OUTPATIENT
Start: 2020-10-07 | End: 2021-01-06 | Stop reason: ALTCHOICE

## 2020-10-07 NOTE — PROGRESS NOTES
10/07/20 0830   Hyperbaric Pre-Inspection   Mattress cleaned prior to treatment Yes   2 Patient Identifiers Verified Yes   Consent Obtained Yes   Cotton Gown Yes   Patient voided Yes   Drainage Bags Emptied Not applicable   Patient Pregnant No   Glasses, Jewelry, Makeup, etc. Removed Yes   Dentures, Hearing Aid, Prosthetic Devices Removed Yes   Touch hair to check for hair spray Yes   Cold/Flu Symptoms No   Diabetic Patient Eaten Yes   Medications Given Not applicable   Fears and apprehensions verbalized Yes   Ground Wire Secured Yes   HBO Treatment Course Details   Treatment Course Number 1   Ordering Provider Suman KAY   Indications Diabetic wounds of lower extremities   HBO Treatment Start Date 10/06/20   HBO Treatment Details   Treatment Number 2   Total Treatment Length Calc (minutes) 106   Chamber Type Monoplace   Chamber # 2   HBO Dive Log # 548   Treatment Protocol 2.0 ANAND x 90 minutes w/100% oxygen and no air break   Treatment Details   Dive Rate Down 2.0 psi/minute   Dive Rate Up 2.0 psi/minute   Compress Begins 1 ANAND   Clock Time 0921   Tx Pressure Reached 0929   Decompress Begins 2 ANAND   Clock Time 1059   Decompress Ends 1 ANAND   Clock Time 1107   Pre HBO Vital Signs   /66   Pulse 84   Resp 20   Temp 97.5 °F (36.4 °C)   Blood Glucose 206   Glucose Meter # wc   Pain Level 0   Post HBO Vital Signs   BP (!) 184/96   Pulse 80   Resp 18   Blood Glucose 85   Glucose Meter # wc   Pain Level 0       Arrived awake and alert. ABC's intact. Vitals as above. Cleared for treatment by Suman KAY Tolerated treatment without complaints. Cleared from chamber without incidents. Follow up ^^^^^^^^ for HBO.       ICD-10-CM ICD-9-CM   1. 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 Acute osteomyelitis of left calcaneus  M86.172 730.07      Physician Supervision  I provided direct supervision and was  immediately available to furnish assistance and direction throughout the performance of the procedure.    Emergency Response Team  A trained emergency response team and emergency services were available throughout procedure.
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
## 2020-10-12 ENCOUNTER — HOSPITAL ENCOUNTER (OUTPATIENT)
Dept: WOUND CARE | Facility: HOSPITAL | Age: 55
Discharge: HOME | End: 2020-10-12
Attending: FAMILY MEDICINE
Payer: MEDICAID

## 2020-10-12 VITALS
RESPIRATION RATE: 20 BRPM | DIASTOLIC BLOOD PRESSURE: 73 MMHG | HEART RATE: 78 BPM | TEMPERATURE: 97 F | SYSTOLIC BLOOD PRESSURE: 173 MMHG

## 2020-10-12 DIAGNOSIS — L97.426 DIABETIC ULCER OF LEFT HEEL ASSOCIATED WITH TYPE 2 DIABETES MELLITUS, WITH BONE INVOLVEMENT WITHOUT EVIDENCE OF NECROSIS: ICD-10-CM

## 2020-10-12 DIAGNOSIS — M86.172 ACUTE OSTEOMYELITIS OF LEFT CALCANEUS: Primary | ICD-10-CM

## 2020-10-12 DIAGNOSIS — E11.621 DIABETIC ULCER OF LEFT HEEL ASSOCIATED WITH TYPE 2 DIABETES MELLITUS, WITH BONE INVOLVEMENT WITHOUT EVIDENCE OF NECROSIS: ICD-10-CM

## 2020-10-12 LAB
POCT GLUCOSE: 73 MG/DL (ref 70–110)
POCT GLUCOSE: 82 MG/DL (ref 70–110)

## 2020-10-12 PROCEDURE — 82962 GLUCOSE BLOOD TEST: CPT

## 2020-10-12 NOTE — PROGRESS NOTES
Arrived awake and alert. ABC's intact. Vitals as above. Initial glucose 73mg /dl. Gave Patient two apple juices and waited approximately 15 minutes to recheck. Follow-up glucose was 82 mg/dl. Pt related she took her morning dose of insulin without eating or checking her levels before.   Notified. She related patient would not be able to have a treatment today because of her glucose levels. Follow up tomorrow for HBO.      ICD-10-CM ICD-9-CM   1. Acute osteomyelitis of left calcaneus  M86.172 730.07   2. Diabetic ulcer of left heel associated with type 2 diabetes mellitus, with bone involvement without evidence of necrosis  E11.621 250.80    L97.426 707.14

## 2020-10-13 ENCOUNTER — HOSPITAL ENCOUNTER (OUTPATIENT)
Dept: WOUND CARE | Facility: HOSPITAL | Age: 55
Discharge: HOME | End: 2020-10-13
Attending: FAMILY MEDICINE
Payer: MEDICAID

## 2020-10-13 VITALS
HEART RATE: 91 BPM | RESPIRATION RATE: 20 BRPM | DIASTOLIC BLOOD PRESSURE: 93 MMHG | TEMPERATURE: 98 F | SYSTOLIC BLOOD PRESSURE: 210 MMHG

## 2020-10-13 DIAGNOSIS — E11.621 DIABETIC ULCER OF LEFT HEEL ASSOCIATED WITH TYPE 2 DIABETES MELLITUS, WITH BONE INVOLVEMENT WITHOUT EVIDENCE OF NECROSIS: ICD-10-CM

## 2020-10-13 DIAGNOSIS — M86.172 ACUTE OSTEOMYELITIS OF LEFT CALCANEUS: Primary | ICD-10-CM

## 2020-10-13 DIAGNOSIS — L97.426 DIABETIC ULCER OF LEFT HEEL ASSOCIATED WITH TYPE 2 DIABETES MELLITUS, WITH BONE INVOLVEMENT WITHOUT EVIDENCE OF NECROSIS: ICD-10-CM

## 2020-10-13 LAB
POCT GLUCOSE: 159 MG/DL (ref 70–110)
POCT GLUCOSE: 180 MG/DL (ref 70–110)

## 2020-10-13 PROCEDURE — 82962 GLUCOSE BLOOD TEST: CPT | Performed by: FAMILY MEDICINE

## 2020-10-13 PROCEDURE — 99183 HYPERBARIC OXYGEN THERAPY: CPT | Mod: ,,, | Performed by: FAMILY MEDICINE

## 2020-10-13 PROCEDURE — 99213 OFFICE O/P EST LOW 20 MIN: CPT | Mod: 27

## 2020-10-13 PROCEDURE — 99183 PR HYPERBARIC OXYGEN THERAPY ATTENDANCE/SUPERVISION, PER SESSION: ICD-10-PCS | Mod: ,,, | Performed by: FAMILY MEDICINE

## 2020-10-13 PROCEDURE — 99213 OFFICE O/P EST LOW 20 MIN: CPT

## 2020-10-13 NOTE — PROGRESS NOTES
10/13/20 0900   Hyperbaric Pre-Inspection   Mattress cleaned prior to treatment Yes   Consent Obtained Yes   Cotton Gown Yes   Patient voided Yes   Drainage Bags Emptied Not applicable   Patient Pregnant No   Glasses, Jewelry, Makeup, etc. Removed Yes   Dentures, Hearing Aid, Prosthetic Devices Removed Yes   Cold/Flu Symptoms No   Diabetic Patient Eaten Yes   Medications Given Not applicable   Fears and apprehensions verbalized Yes   Ground Wire Secured Yes   HBO Treatment Course Details   Treatment Course Number 1   Ordering Provider Suman KAY   Indications Diabetic wounds of lower extremities   HBO Treatment Start Date 10/06/20   HBO Treatment Details   Treatment Number 3   Inpatient Visit No   Total Treatment Length Calc (minutes) 106   Chamber Type Monoplace   Chamber # 2   HBO Dive Log # 549   Treatment Protocol 2.0 ANAND x 90 minutes w/100% oxygen and no air break   Treatment Details   Dive Rate Down 2.0 psi/minute   Dive Rate Up 2.0 psi/minute   Compress Begins 1 ANAND   Clock Time 0902   Tx Pressure Reached 2 ANAND   Clock Time 0910   Decompress Begins 2 ANAND   Clock Time 1040   Decompress Ends 1 ANAND   Clock Time 1048   Pre HBO Vital Signs   BP (!) 148/71   Pulse 90   Resp 18   Temp 97.7 °F (36.5 °C)   Blood Glucose 180   Glucose Meter # wc   Pain Level 0   Post HBO Vital Signs   BP (!) 210/93  (Patient  is asymptomatic. dr notified. Will re-check after nurse visit.)   Pulse 91   Resp 18   Blood Glucose 159   Glucose Meter # wc   Pain Level 0       Arrived awake and alert. ABC's intact. Vitals as above. Cleared for treatment by Catherine KAY Tolerated treatment without complaints. Cleared from chamber without incidents. Follow up tomorrow for HBO.       ICD-10-CM ICD-9-CM   1. Acute osteomyelitis of left calcaneus  M86.172 730.07   2. Diabetic ulcer of left heel associated with type 2 diabetes mellitus, with bone involvement without evidence of necrosis  E11.621 250.80    L97.426 707.14      Physician  Supervision  I provided direct supervision and was immediately available to furnish assistance and direction throughout the performance of the procedure.    Emergency Response Team  A trained emergency response team and emergency services were available throughout procedure.

## 2020-10-13 NOTE — PROGRESS NOTES
Dictation #1  MRN:[id number]  CSN:[id number]  [facility] [address]   [place]  [id number]  Nurse Visit    Subjective:       Patient seen in clinic today.  Dressing changed as ordered.      Assessment:          Incision/Site 09/08/20 1316 Left Heel (Active)   09/08/20 1316   Present Prior to Hospital Arrival?:    Side: Left   Location: Heel   Orientation:    Incision Type:    Closure Method:    Additional Comments:    Removal Indication and Assessment:    Wound Outcome:    Removal Indications:    Incision WDL ex 10/13/20 1100   Dressing Appearance Intact;Moist drainage 10/13/20 1100   Drainage Amount Large 10/13/20 1100   Drainage Characteristics/Odor Serosanguineous;Malodorous 10/13/20 1100   Appearance Pink;Red;Yellow 10/13/20 1100   Black (%), Wound Tissue Color 0 % 10/13/20 1100   Red (%), Wound Tissue Color 50 % 10/13/20 1100   Yellow (%), Wound Tissue Color 50 % 10/13/20 1100   Periwound Area Macerated 10/13/20 1100   Wound Edges Open 10/13/20 1100   Care Cleansed with:;Soap and water;Sterile normal saline 10/13/20 1100   Dressing Applied;Methylene blue/gentian violet;Foam;Cast padding 10/13/20 1100   Off Loading Football dressing;Off loading shoe 10/13/20 1100   Dressing Change Due 10/16/20 10/13/20 1100           Plan:     No orders of the defined types were placed in this encounter.          Follow up in about 1 week (around 10/20/2020) for wound care.

## 2020-10-14 ENCOUNTER — HOSPITAL ENCOUNTER (OUTPATIENT)
Dept: WOUND CARE | Facility: HOSPITAL | Age: 55
Discharge: HOME | End: 2020-10-14
Attending: INTERNAL MEDICINE
Payer: MEDICAID

## 2020-10-14 DIAGNOSIS — M86.172 ACUTE OSTEOMYELITIS OF LEFT CALCANEUS: Primary | ICD-10-CM

## 2020-10-14 DIAGNOSIS — E11.621 DIABETIC ULCER OF LEFT HEEL ASSOCIATED WITH TYPE 2 DIABETES MELLITUS, WITH BONE INVOLVEMENT WITHOUT EVIDENCE OF NECROSIS: ICD-10-CM

## 2020-10-14 DIAGNOSIS — L97.426 DIABETIC ULCER OF LEFT HEEL ASSOCIATED WITH TYPE 2 DIABETES MELLITUS, WITH BONE INVOLVEMENT WITHOUT EVIDENCE OF NECROSIS: ICD-10-CM

## 2020-10-14 LAB
POCT GLUCOSE: 129 MG/DL (ref 70–110)
POCT GLUCOSE: 130 MG/DL (ref 70–110)
POCT GLUCOSE: 135 MG/DL (ref 70–110)

## 2020-10-14 PROCEDURE — 99183 HYPERBARIC OXYGEN THERAPY: CPT | Mod: ,,, | Performed by: INTERNAL MEDICINE

## 2020-10-14 PROCEDURE — 82962 GLUCOSE BLOOD TEST: CPT | Performed by: INTERNAL MEDICINE

## 2020-10-14 PROCEDURE — 99183 PR HYPERBARIC OXYGEN THERAPY ATTENDANCE/SUPERVISION, PER SESSION: ICD-10-PCS | Mod: ,,, | Performed by: INTERNAL MEDICINE

## 2020-10-14 NOTE — PROGRESS NOTES
10/14/20 0900   Hyperbaric Pre-Inspection   Mattress cleaned prior to treatment Yes   2 Patient Identifiers Verified Yes   Cotton Gown Yes   Patient voided Yes   Drainage Bags Emptied Not applicable   Patient Pregnant No   Glasses, Jewelry, Makeup, etc. Removed Yes   Dentures, Hearing Aid, Prosthetic Devices Removed Yes   Touch hair to check for hair spray Yes   Cold/Flu Symptoms No   Diabetic Patient Eaten Yes   Medications Given Not applicable   Fears and apprehensions verbalized Yes   Ground Wire Secured Yes   HBO Treatment Course Details   Treatment Course Number 1   Ordering Provider Suman KAY   Indications Diabetic wounds of lower extremities   HBO Treatment Start Date 10/06/20   HBO Treatment Details   Treatment Number 4   Inpatient Visit No   Total Treatment Length Calc (minutes) 136   Chamber Type Monoplace   Chamber # 2   HBO Dive Log # 550   Treatment Protocol 2.0 ANAND x 120 minutes w/100% oxygen and no air break   Treatment Details   Dive Rate Down 2.0 psi/minute   Dive Rate Up 2.0 psi/minute   Compress Begins 1 ANAND   Clock Time 0925   Tx Pressure Reached 2 ANAND   Clock Time 0933   Decompress Begins 2 ANAND   Clock Time 1133   Decompress Ends 1 ANAND   Clock Time 1141   Pre HBO Vital Signs   BP (!) 158/75   Pulse 88   Resp 18   Temp 97.7 °F (36.5 °C)   Blood Glucose 129  (Patient drank two apple juices. 135mg /dl after 15 mins.)   Glucose Meter # wc   Pain Level 0   Post HBO Vital Signs   BP (!) 192/90  (pt is asymptomatic.)   Pulse 96   Resp 18   Blood Glucose 130   Glucose Meter # wc   Pain Level 0   Ear Evaluation   Left Teed Scale Pre Grade 0   Left Teed Scale Post Grade 0   Right Teed Scale Pre Grade 0   Right Teed Scale Post Grade 0     Arrived awake and alert. ABC's intact. Vitals as above. Cleared for treatment by Diamond KAY Tolerated treatment without complaints. Cleared from chamber without incidents. Follow up tomorrow for HBO.       ICD-10-CM ICD-9-CM   1. Acute osteomyelitis of left  calcaneus  M86.172 730.07   2. Diabetic ulcer of left heel associated with type 2 diabetes mellitus, with bone involvement without evidence of necrosis  E11.621 250.80    L97.426 707.14      Physician Supervision  I provided direct supervision and was immediately available to furnish assistance and direction throughout the performance of the procedure.    Emergency Response Team  A trained emergency response team and emergency services were available throughout procedure.

## 2020-10-16 ENCOUNTER — HOSPITAL ENCOUNTER (OUTPATIENT)
Dept: WOUND CARE | Facility: HOSPITAL | Age: 55
Discharge: HOME | End: 2020-10-16
Attending: PODIATRIST
Payer: MEDICAID

## 2020-10-16 ENCOUNTER — HOSPITAL ENCOUNTER (OUTPATIENT)
Dept: WOUND CARE | Facility: HOSPITAL | Age: 55
Discharge: HOME | End: 2020-10-16
Attending: FAMILY MEDICINE
Payer: MEDICAID

## 2020-10-16 VITALS — HEART RATE: 88 BPM | DIASTOLIC BLOOD PRESSURE: 86 MMHG | TEMPERATURE: 98 F | SYSTOLIC BLOOD PRESSURE: 194 MMHG

## 2020-10-16 DIAGNOSIS — E11.621 DIABETIC ULCER OF LEFT HEEL ASSOCIATED WITH TYPE 2 DIABETES MELLITUS, WITH BONE INVOLVEMENT WITHOUT EVIDENCE OF NECROSIS: Primary | ICD-10-CM

## 2020-10-16 DIAGNOSIS — S91.302A NON HEALING LEFT HEEL WOUND: Primary | ICD-10-CM

## 2020-10-16 DIAGNOSIS — L97.426 DIABETIC ULCER OF LEFT HEEL ASSOCIATED WITH TYPE 2 DIABETES MELLITUS, WITH BONE INVOLVEMENT WITHOUT EVIDENCE OF NECROSIS: Primary | ICD-10-CM

## 2020-10-16 DIAGNOSIS — M86.172 ACUTE OSTEOMYELITIS OF LEFT CALCANEUS: ICD-10-CM

## 2020-10-16 PROCEDURE — 27201912 HC WOUND CARE DEBRIDEMENT SUPPLIES: Performed by: PODIATRIST

## 2020-10-16 PROCEDURE — 11042 WOUND DEBRIDEMENT: ICD-10-PCS | Mod: ,,, | Performed by: PODIATRIST

## 2020-10-16 PROCEDURE — 82962 GLUCOSE BLOOD TEST: CPT | Performed by: FAMILY MEDICINE

## 2020-10-16 PROCEDURE — 11042 DBRDMT SUBQ TIS 1ST 20SQCM/<: CPT | Mod: ,,, | Performed by: PODIATRIST

## 2020-10-16 PROCEDURE — 99499 UNLISTED E&M SERVICE: CPT | Mod: ,,, | Performed by: PODIATRIST

## 2020-10-16 PROCEDURE — 11042 DBRDMT SUBQ TIS 1ST 20SQCM/<: CPT | Performed by: PODIATRIST

## 2020-10-16 PROCEDURE — 99499 NO LOS: ICD-10-PCS | Mod: ,,, | Performed by: PODIATRIST

## 2020-10-16 NOTE — PROGRESS NOTES
[facility]                Progress Note    Subjective:       Patient ID: Suyapa Connelly is a 55 y.o. female.    Chief Complaint: No chief complaint on file.    Walked to clinic unaided. Complains of pain 8 on 1-10 scale at all times. Heel wound about the same size with scant slough which was debrided after lidocaine x 15 min. Tolerated well. Wound red and beefy after procedure. Will have nurse visit one day next week after HBO when space available.      Review of Systems   Constitutional: Positive for activity change.   Respiratory: Negative.    Cardiovascular: Positive for leg swelling.   Gastrointestinal: Negative.    Genitourinary: Negative.    Musculoskeletal: Positive for arthralgias.   Skin: Positive for wound.   Neurological: Negative.    Psychiatric/Behavioral: Negative.          Objective:        Physical Exam  Constitutional:       Appearance: She is well-developed.      Comments: Oriented to time, place, and person.   Cardiovascular:      Comments: DP and PT pulses are palpable bilaterally. 3 sec capillary refill time and toes and feet are warm to touch proximally .  There is  hair growth on the feet and toes b/l. There is no edema b/l. No spider veins or varicosities present b/l.     Musculoskeletal:      Comments: Equinus noted b/l ankles with < 10 deg DF noted. MMT 5/5 in DF/PF/Inv/Ev resistance with no reproduction of pain in any direction. Passive range of motion of ankle and pedal joints is painless b/l.     Feet:      Right foot:      Skin integrity: No callus or dry skin.      Left foot:      Skin integrity: No callus or dry skin.   Lymphadenopathy:      Comments: Negative lymphadenopathy bilateral popliteal fossa and tarsal tunnel.   Skin:     Comments: See wound description.    Neurological:      Mental Status: She is alert.      Comments: Light touch, proprioception, and sharp/dull sensation are all intact bilaterally. Protective threshold with  the [place]-Wienstein monofilament is intact bilaterally.    Psychiatric:         Behavior: Behavior is cooperative.         Vitals:    10/16/20 1339   BP: (!) 194/86   Pulse: 88   Temp: 97.6 °F (36.4 °C)       Assessment:           ICD-10-CM ICD-9-CM   1. Non healing left heel wound  S91.302A 892.1            Incision/Site 08/18/20 1127 Left Groin (Active)   08/18/20 1127   Present Prior to Hospital Arrival?:    Side: Left   Location: Groin   Orientation:    Incision Type:    Closure Method: Sutures;Staples   Additional Comments: island dressing   Removal Indication and Assessment:    Wound Outcome:    Removal Indications:    Wound Image   10/16/20 1300   Incision WDL ex 10/16/20 1300   Dressing Appearance Dry 10/16/20 1300   Drainage Amount Moderate 10/16/20 1300   Drainage Characteristics/Odor Clear 10/16/20 1300   Red (%), Wound Tissue Color 95 % 10/16/20 1300   Yellow (%), Wound Tissue Color 5 % 10/16/20 1300   Periwound Area Intact 10/16/20 1300   Wound Edges Defined 10/16/20 1300   Wound Length (cm) 1.7 cm 10/16/20 1300   Wound Width (cm) 3.5 cm 10/16/20 1300   Wound Depth (cm) 0.6 cm 10/16/20 1300   Wound Volume (cm^3) 3.57 cm^3 10/16/20 1300   Wound Surface Area (cm^2) 5.95 cm^2 10/16/20 1300   Care Cleansed with:;Antimicrobial agent;Sterile normal saline 10/16/20 1300   Dressing Changed 10/16/20 1300   Off Loading Football dressing;Off loading shoe 10/16/20 1300   Dressing Change Due 10/23/20 10/16/20 1300           Plan:            Debridement: see procedure note   Dressings:Per above     Offloading:Foam    Follow-up:Patient is to return to the clinic in 1 week  for follow-up but should call [facility] immediately if any signs of infection, such as fever, chills, sweats, increased redness or pain.    Short-term goals include maintaining good offloading and minimizing bioburden, promoting granulation and epithelialization to healing.  Long-term goals include keeping the wound healed by good offloading and  medical management under the direction of internist.        Orders Placed This Encounter   Procedures    Change dressing     Left heel- gentian violet periwound, santyl to wound bed backed with HB transfer, mextra short X1, scott foam long X2, football dressing (cast padding X3, stockinet)        Follow up in about 1 week (around 10/23/2020).

## 2020-10-16 NOTE — PROGRESS NOTES
Arrived awake and alert. ABC's intact. Vitals as above. Glucose is 134mg/dl. B.page notified. Patient's blood pressure was to elevated for treatment today. Patient will follow up monday for HBO.      ICD-10-CM ICD-9-CM   1. 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 Acute osteomyelitis of left calcaneus  M86.172 730.07

## 2020-10-19 LAB — POCT GLUCOSE: 134 MG/DL (ref 70–110)

## 2020-10-20 ENCOUNTER — HOSPITAL ENCOUNTER (OUTPATIENT)
Dept: WOUND CARE | Facility: HOSPITAL | Age: 55
Discharge: HOME | End: 2020-10-20
Attending: FAMILY MEDICINE
Payer: MEDICAID

## 2020-10-20 VITALS
RESPIRATION RATE: 17 BRPM | HEART RATE: 73 BPM | DIASTOLIC BLOOD PRESSURE: 79 MMHG | SYSTOLIC BLOOD PRESSURE: 169 MMHG | RESPIRATION RATE: 20 BRPM | DIASTOLIC BLOOD PRESSURE: 79 MMHG | HEART RATE: 73 BPM | SYSTOLIC BLOOD PRESSURE: 169 MMHG | TEMPERATURE: 98 F | TEMPERATURE: 98 F

## 2020-10-20 DIAGNOSIS — L97.426 DIABETIC ULCER OF LEFT HEEL ASSOCIATED WITH TYPE 2 DIABETES MELLITUS, WITH BONE INVOLVEMENT WITHOUT EVIDENCE OF NECROSIS: ICD-10-CM

## 2020-10-20 DIAGNOSIS — E11.621 DIABETIC ULCER OF LEFT HEEL ASSOCIATED WITH TYPE 2 DIABETES MELLITUS, WITH BONE INVOLVEMENT WITHOUT EVIDENCE OF NECROSIS: ICD-10-CM

## 2020-10-20 DIAGNOSIS — L97.426 DIABETIC ULCER OF LEFT HEEL ASSOCIATED WITH TYPE 2 DIABETES MELLITUS, WITH BONE INVOLVEMENT WITHOUT EVIDENCE OF NECROSIS: Primary | ICD-10-CM

## 2020-10-20 DIAGNOSIS — M86.172 ACUTE OSTEOMYELITIS OF LEFT CALCANEUS: Primary | ICD-10-CM

## 2020-10-20 DIAGNOSIS — E11.621 DIABETIC ULCER OF LEFT HEEL ASSOCIATED WITH TYPE 2 DIABETES MELLITUS, WITH BONE INVOLVEMENT WITHOUT EVIDENCE OF NECROSIS: Primary | ICD-10-CM

## 2020-10-20 LAB — POCT GLUCOSE: 112 MG/DL (ref 70–110)

## 2020-10-20 PROCEDURE — 99214 OFFICE O/P EST MOD 30 MIN: CPT

## 2020-10-20 PROCEDURE — 82962 GLUCOSE BLOOD TEST: CPT | Performed by: FAMILY MEDICINE

## 2020-10-20 NOTE — PROGRESS NOTES
[facility]  [address]  [address]  Nurse Visit    Subjective:       Patient seen in clinic today.  Dressing changed as ordered.      Assessment:          Incision/Site 08/18/20 1127 Left Groin (Active)   08/18/20 1127   Present Prior to Hospital Arrival?:    Side: Left   Location: Groin   Orientation:    Incision Type:    Closure Method: Sutures;Staples   Additional Comments: island dressing   Removal Indication and Assessment:    Wound Outcome:    Removal Indications:    Incision WDL ex 10/20/20 0900   Dressing Appearance Open to air 10/20/20 0900   Drainage Amount None 10/20/20 0900   Appearance Red;Pink 10/20/20 0900   Black (%), Wound Tissue Color 0 % 10/20/20 0900   Red (%), Wound Tissue Color 100 % 10/20/20 0900   Yellow (%), Wound Tissue Color 0 % 10/20/20 0900   Periwound Area Intact 10/20/20 0900   Wound Edges Defined 10/20/20 0900   Tunneling (depth (cm)/location) 0 10/20/20 0900   Undermining (depth (cm)/location) 0 10/20/20 0900   Care Soap and water;Sterile normal saline;Wound cleanser 10/20/20 0900   Dressing Applied;Gauze 10/20/20 0900   Dressing Change Due 10/23/20 10/20/20 0900            Incision/Site 09/08/20 1316 Left Heel (Active)   09/08/20 1316   Present Prior to Hospital Arrival?:    Side: Left   Location: Heel   Orientation:    Incision Type:    Closure Method:    Additional Comments:    Removal Indication and Assessment:    Wound Outcome:    Removal Indications:    Incision WDL ex 10/20/20 0900   Dressing Appearance Intact;Moist drainage 10/20/20 0900   Drainage Amount Moderate 10/20/20 0900   Drainage Characteristics/Odor Serosanguineous;Malodorous 10/20/20 0900   Appearance Red;Pink;White;Slough 10/20/20 0900   Black (%), Wound Tissue Color 0 % 10/20/20 0900   Red (%), Wound Tissue Color 80 % 10/20/20 0900   Yellow (%), Wound Tissue Color 20 % 10/20/20 0900   Periwound Area Macerated 10/20/20 0900   Wound Edges Irregular 10/20/20 0900   Tunneling  (depth (cm)/location) 0 10/20/20 0900   Undermining (depth (cm)/location) 0 10/20/20 0900   Care Wound cleanser;Soap and water;Sterile normal saline 10/20/20 0900   Dressing Applied;Other (see comments);Foam 10/20/20 0900   Periwound Care Other (see comments) 10/20/20 0900   Off Loading Football dressing 10/20/20 0900   Dressing Change Due 10/23/20 10/20/20 0900           Plan:     No orders of the defined types were placed in this encounter.          No follow-ups on file.

## 2020-10-20 NOTE — PROGRESS NOTES
Arrived awake and alert. ABC's intact. Vitals as above.  Patiens glucose was 112 mg/dl. She related she took two unit of her insulin but didn't eat secondary to being nauseated. She also related that her blood pressure and her appetite aren't good in the morning. Dr. Brown notified. Pt will not be treating today and her appointment  times have been moved to 1300 to accommodate patient. Follow up tomorrow for HBO.        ICD-10-CM ICD-9-CM   1. Acute osteomyelitis of left calcaneus  M86.172 730.07   2. Diabetic ulcer of left heel associated with type 2 diabetes mellitus, with bone involvement without evidence of necrosis  E11.621 250.80    L97.426 707.14

## 2020-10-21 ENCOUNTER — HOSPITAL ENCOUNTER (OUTPATIENT)
Dept: WOUND CARE | Facility: HOSPITAL | Age: 55
Discharge: HOME | End: 2020-10-21
Attending: FAMILY MEDICINE
Payer: MEDICAID

## 2020-10-21 DIAGNOSIS — E11.621 DIABETIC ULCER OF LEFT HEEL ASSOCIATED WITH TYPE 2 DIABETES MELLITUS, WITH BONE INVOLVEMENT WITHOUT EVIDENCE OF NECROSIS: Primary | ICD-10-CM

## 2020-10-21 DIAGNOSIS — L97.426 DIABETIC ULCER OF LEFT HEEL ASSOCIATED WITH TYPE 2 DIABETES MELLITUS, WITH BONE INVOLVEMENT WITHOUT EVIDENCE OF NECROSIS: Primary | ICD-10-CM

## 2020-10-21 DIAGNOSIS — M86.172 ACUTE OSTEOMYELITIS OF LEFT CALCANEUS: ICD-10-CM

## 2020-10-21 LAB
POCT GLUCOSE: 149 MG/DL (ref 70–110)
POCT GLUCOSE: 167 MG/DL (ref 70–110)

## 2020-10-21 PROCEDURE — 99183 HYPERBARIC OXYGEN THERAPY: CPT | Mod: ,,, | Performed by: FAMILY MEDICINE

## 2020-10-21 PROCEDURE — 82962 GLUCOSE BLOOD TEST: CPT | Performed by: FAMILY MEDICINE

## 2020-10-21 PROCEDURE — 99183 PR HYPERBARIC OXYGEN THERAPY ATTENDANCE/SUPERVISION, PER SESSION: ICD-10-PCS | Mod: ,,, | Performed by: FAMILY MEDICINE

## 2020-10-21 PROCEDURE — G0277 HBOT, FULL BODY CHAMBER, 30M: HCPCS | Performed by: FAMILY MEDICINE

## 2020-10-21 NOTE — PROGRESS NOTES
10/21/20 1300   Hyperbaric Pre-Inspection   Mattress cleaned prior to treatment Yes   2 Patient Identifiers Verified Yes   Consent Obtained Yes   Cotton Gown Yes   Patient voided Yes   Drainage Bags Emptied Not applicable   Patient Pregnant No   Glasses, Jewelry, Makeup, etc. Removed Yes   Dentures, Hearing Aid, Prosthetic Devices Removed Yes   Touch hair to check for hair spray Yes   Cold/Flu Symptoms No   Diabetic Patient Eaten Yes   Medications Given Not applicable   Fears and apprehensions verbalized Yes   Ground Wire Secured Yes   HBO Treatment Course Details   Treatment Course Number 1   Ordering Provider Suman KAY   Indications Diabetic wounds of lower extremities   HBO Treatment Start Date 10/06/20   HBO Treatment Details   Treatment Number 5   Inpatient Visit No   Total Treatment Length Calc (minutes) 106   Chamber Type Monoplace   Chamber # 2   HBO Dive Log # 551   Treatment Protocol 2.0 ANAND x 90 minutes w/100% oxygen and no air break   Treatment Details   Dive Rate Down 2.0 psi/minute   Dive Rate Up 2.0 psi/minute   Compress Begins 1 ANAND   Clock Time 1334   Tx Pressure Reached 2 ANAND   Decompress Begins 2 ANAND   Clock Time 1512   Decompress Ends 1 ANAND   Clock Time 1520   Pre HBO Vital Signs   BP (!) 141/74   Pulse 92   Resp 20   Temp 97.5 °F (36.4 °C)   Blood Glucose 167   Glucose Meter # wc   Pain Level 0   Post HBO Vital Signs   BP (!) 209/96  (repeat vitals approx 15 minutes.... 185/88. pt is asymptomatic and states she will recheck her bp when she gets home.)   Pulse 92   Resp 18   Blood Glucose 149   Glucose Meter # wc   Pain Level 0       Arrived awake and alert. ABC's intact. Vitals as above. Cleared for treatment by Suman KAY Tolerated treatment without complaints. Cleared from chamber without incidents. Follow up tomorrow for HBO.       ICD-10-CM ICD-9-CM   1. Diabetic ulcer of left heel associated with type 2 diabetes mellitus, with bone involvement without evidence of necrosis  E11.621  250.80    L97.426 707.14   2. Acute osteomyelitis of left calcaneus  M86.172 730.07        Physician Supervision  I provided direct supervision and was immediately available to furnish assistance and direction throughout the performance of the procedure.    Emergency Response Team  A trained emergency response team and emergency services were available throughout procedure.

## 2020-10-21 NOTE — PROCEDURES
Wound Debridement    Date/Time: 10/2/2020 2:00 PM  Performed by: Rosio Mayes DPM  Authorized by: Rosio Mayes DPM     Consent Done?:  Yes (Written)    Wound Details:    Location:  Left foot    Location:  Left Heel    Type of Debridement:  Excisional       Length (cm):  4.1       Area (sq cm):  14.35       Width (cm):  3.5       Percent Debrided (%):  100       Depth (cm):  0.7       Total Area Debrided (sq cm):  14.35    Depth of debridement:  Subcutaneous tissue    Tissue debrided:  Dermis    Devitalized tissue debrided:  Biofilm, Callus, Exudate and Fibrin    Instruments:  Curette    Bleeding:  Minimal  Hemostasis Achieved: Yes    Method Used:  Pressure  Patient tolerance:  Patient tolerated the procedure well with no immediate complications

## 2020-10-22 ENCOUNTER — HOSPITAL ENCOUNTER (OUTPATIENT)
Dept: WOUND CARE | Facility: HOSPITAL | Age: 55
Discharge: HOME | End: 2020-10-22
Attending: INTERNAL MEDICINE
Payer: MEDICAID

## 2020-10-22 VITALS — HEART RATE: 89 BPM | DIASTOLIC BLOOD PRESSURE: 91 MMHG | SYSTOLIC BLOOD PRESSURE: 201 MMHG

## 2020-10-22 DIAGNOSIS — L97.426 DIABETIC ULCER OF LEFT HEEL ASSOCIATED WITH TYPE 2 DIABETES MELLITUS, WITH BONE INVOLVEMENT WITHOUT EVIDENCE OF NECROSIS: Primary | ICD-10-CM

## 2020-10-22 DIAGNOSIS — E11.621 DIABETIC ULCER OF LEFT HEEL ASSOCIATED WITH TYPE 2 DIABETES MELLITUS, WITH BONE INVOLVEMENT WITHOUT EVIDENCE OF NECROSIS: Primary | ICD-10-CM

## 2020-10-22 DIAGNOSIS — M86.172 ACUTE OSTEOMYELITIS OF LEFT CALCANEUS: ICD-10-CM

## 2020-10-22 LAB — POCT GLUCOSE: 172 MG/DL (ref 70–110)

## 2020-10-22 PROCEDURE — 99183 PR HYPERBARIC OXYGEN THERAPY ATTENDANCE/SUPERVISION, PER SESSION: ICD-10-PCS | Mod: ,,, | Performed by: INTERNAL MEDICINE

## 2020-10-22 PROCEDURE — 99183 HYPERBARIC OXYGEN THERAPY: CPT | Mod: ,,, | Performed by: INTERNAL MEDICINE

## 2020-10-22 PROCEDURE — G0277 HBOT, FULL BODY CHAMBER, 30M: HCPCS | Performed by: INTERNAL MEDICINE

## 2020-10-22 PROCEDURE — 82962 GLUCOSE BLOOD TEST: CPT | Performed by: INTERNAL MEDICINE

## 2020-10-22 NOTE — PROGRESS NOTES
10/22/20 1516   Hyperbaric Pre-Inspection   Mattress cleaned prior to treatment Yes   2 Patient Identifiers Verified Yes   Consent Obtained Yes   Cotton Gown Yes   Patient voided Yes   Drainage Bags Emptied Not applicable   Patient Pregnant No   Glasses, Jewelry, Makeup, etc. Removed Yes   Dentures, Hearing Aid, Prosthetic Devices Removed Yes   Touch hair to check for hair spray Yes   Cold/Flu Symptoms No   Diabetic Patient Eaten Yes   Medications Given No   Fears and apprehensions verbalized Yes   Ground Wire Secured Yes   HBO Treatment Course Details   Treatment Course Number 1   Ordering Provider Suman KAY   Indications Diabetic wounds of lower extremities   HBO Treatment Start Date 10/06/20   HBO Treatment Details   Treatment Number 6   Inpatient Visit No   Total Treatment Length Calc (minutes) 106   Chamber Type Monoplace   Chamber # 2   HBO Dive Log # 552   Treatment Protocol 2.0 ANAND x 90 minutes w/100% oxygen and no air break   Treatment Details   Dive Rate Down 2.0 psi/minute   Dive Rate Up 2.0 psi/minute   Compress Begins 1 ANAND   Clock Time 1322   Tx Pressure Reached 2 ANAND   Clock Time 1330   Decompress Begins 2 ANAND   Clock Time 1500   Decompress Ends 1 ANAND   Clock Time 1508   Pre HBO Vital Signs   /64   Pulse 97   Resp 20   Temp 97.9 °F (36.6 °C)   Blood Glucose 172   Glucose Meter # wc   Pain Level 0   Post HBO Vital Signs   BP (!) 213/97  (repeat after 5 mins 205/98.)   Pulse 93   Resp 18   Blood Glucose 147   Glucose Meter # wc   Pain Level 0     Arrived awake and alert. ABC's intact. Vitals as above. Cleared for treatment by Diamond KAY Tolerated treatment without complaints. Cleared from chamber without incidents.  Pt post blood pressure is high. Will recheck approximately 10 minutes. Prior to patient leaving. Follow up tomorrow for HBO.          ICD-10-CM ICD-9-CM   1. Diabetic ulcer of left heel associated with type 2 diabetes mellitus, with bone involvement without evidence of necrosis   E11.621 250.80    L97.426 707.14   2. Acute osteomyelitis of left calcaneus  M86.172 730.07        Physician Supervision  I provided direct supervision and was immediately available to furnish assistance and direction throughout the performance of the procedure.    Emergency Response Team  A trained emergency response team and emergency services were available throughout procedure.

## 2020-10-23 ENCOUNTER — HOSPITAL ENCOUNTER (OUTPATIENT)
Dept: WOUND CARE | Facility: HOSPITAL | Age: 55
Discharge: HOME | End: 2020-10-23
Attending: FAMILY MEDICINE
Payer: MEDICAID

## 2020-10-23 ENCOUNTER — HOSPITAL ENCOUNTER (OUTPATIENT)
Dept: WOUND CARE | Facility: HOSPITAL | Age: 55
Discharge: HOME | End: 2020-10-23
Attending: PODIATRIST
Payer: MEDICAID

## 2020-10-23 VITALS
HEART RATE: 90 BPM | RESPIRATION RATE: 20 BRPM | TEMPERATURE: 98 F | SYSTOLIC BLOOD PRESSURE: 189 MMHG | DIASTOLIC BLOOD PRESSURE: 90 MMHG

## 2020-10-23 DIAGNOSIS — E11.621 DIABETIC ULCER OF LEFT HEEL ASSOCIATED WITH TYPE 2 DIABETES MELLITUS, WITH BONE INVOLVEMENT WITHOUT EVIDENCE OF NECROSIS: Primary | ICD-10-CM

## 2020-10-23 DIAGNOSIS — M86.172 ACUTE OSTEOMYELITIS OF LEFT CALCANEUS: ICD-10-CM

## 2020-10-23 DIAGNOSIS — L97.426 DIABETIC ULCER OF LEFT HEEL ASSOCIATED WITH TYPE 2 DIABETES MELLITUS, WITH BONE INVOLVEMENT WITHOUT EVIDENCE OF NECROSIS: Primary | ICD-10-CM

## 2020-10-23 LAB
POCT GLUCOSE: 147 MG/DL (ref 70–110)
POCT GLUCOSE: 151 MG/DL (ref 70–110)
POCT GLUCOSE: 190 MG/DL (ref 70–110)

## 2020-10-23 PROCEDURE — 99183 PR HYPERBARIC OXYGEN THERAPY ATTENDANCE/SUPERVISION, PER SESSION: ICD-10-PCS | Mod: ,,, | Performed by: FAMILY MEDICINE

## 2020-10-23 PROCEDURE — 11042 DBRDMT SUBQ TIS 1ST 20SQCM/<: CPT | Performed by: PODIATRIST

## 2020-10-23 PROCEDURE — 99499 UNLISTED E&M SERVICE: CPT | Mod: ,,, | Performed by: PODIATRIST

## 2020-10-23 PROCEDURE — 82962 GLUCOSE BLOOD TEST: CPT | Performed by: FAMILY MEDICINE

## 2020-10-23 PROCEDURE — G0277 HBOT, FULL BODY CHAMBER, 30M: HCPCS | Performed by: FAMILY MEDICINE

## 2020-10-23 PROCEDURE — 11042 WOUND DEBRIDEMENT: ICD-10-PCS | Mod: ,,, | Performed by: PODIATRIST

## 2020-10-23 PROCEDURE — 11042 DBRDMT SUBQ TIS 1ST 20SQCM/<: CPT | Mod: ,,, | Performed by: PODIATRIST

## 2020-10-23 PROCEDURE — 27201912 HC WOUND CARE DEBRIDEMENT SUPPLIES: Performed by: PODIATRIST

## 2020-10-23 PROCEDURE — 99183 HYPERBARIC OXYGEN THERAPY: CPT | Mod: ,,, | Performed by: FAMILY MEDICINE

## 2020-10-23 PROCEDURE — 99499 NO LOS: ICD-10-PCS | Mod: ,,, | Performed by: PODIATRIST

## 2020-10-23 NOTE — PROGRESS NOTES
[facility]                Progress Note    Subjective:       Patient ID: Suyapa Connelly is a 55 y.o. female.    Chief Complaint: Wound Check    F/u wound care visit. Patient ambulatory to exam room with no c/o pain at present. Wound dressing to left foot intact with moderate amount of serosanguineous drainage noted. Wound improving. 5% lidocaine applied.      Review of Systems   Constitutional: Negative.    Respiratory: Negative.    Gastrointestinal: Negative.    Genitourinary: Negative.    Musculoskeletal: Positive for arthralgias.   Skin: Positive for wound.   Neurological: Negative.    Psychiatric/Behavioral: Negative.          Objective:        Physical Exam  Constitutional:       Appearance: She is well-developed.      Comments: Oriented to time, place, and person.   Cardiovascular:      Comments: DP and PT pulses are palpable bilaterally. 3 sec capillary refill time and toes and feet are warm to touch proximally .  There is  hair growth on the feet and toes b/l. There is no edema b/l. No spider veins or varicosities present b/l.     Musculoskeletal:      Comments: Equinus noted b/l ankles with < 10 deg DF noted. MMT 5/5 in DF/PF/Inv/Ev resistance with no reproduction of pain in any direction. Passive range of motion of ankle and pedal joints is painless b/l.     Feet:      Right foot:      Skin integrity: No callus or dry skin.      Left foot:      Skin integrity: No callus or dry skin.   Lymphadenopathy:      Comments: Negative lymphadenopathy bilateral popliteal fossa and tarsal tunnel.   Skin:     Comments: No open lesions, lacerations or wounds noted.Interdigital spaces clean, dry and intact b/l. No erythema noted to b/l foot.  Nails normal color and trophic qualities.     Neurological:      Mental Status: She is alert.      Comments: Light touch, proprioception, and sharp/dull sensation are all intact bilaterally. Protective threshold with the  [place]-Wienstein monofilament is intact bilaterally.    Psychiatric:         Behavior: Behavior is cooperative.         Vitals:    10/23/20 1304   BP: (!) 189/90   Pulse: 90   Resp: 20   Temp: 97.9 °F (36.6 °C)       Assessment:           ICD-10-CM ICD-9-CM   1. Diabetic ulcer of left heel associated with type 2 diabetes mellitus, with bone involvement without evidence of necrosis  E11.621 250.80    L97.426 707.14            Incision/Site 09/08/20 1316 Left Heel (Active)   09/08/20 1316   Present Prior to Hospital Arrival?:    Side: Left   Location: Heel   Orientation:    Incision Type:    Closure Method:    Additional Comments:    Removal Indication and Assessment:    Wound Outcome:    Removal Indications:    Wound Image   10/23/20 1300   Incision WDL ex 10/23/20 1300   Dressing Appearance Intact;Moist drainage 10/23/20 1300   Drainage Amount Moderate 10/23/20 1300   Drainage Characteristics/Odor Serosanguineous 10/23/20 1300   Appearance Red;Yellow 10/23/20 1300   Black (%), Wound Tissue Color 0 % 10/23/20 1300   Red (%), Wound Tissue Color 60 % 10/23/20 1300   Yellow (%), Wound Tissue Color 40 % 10/23/20 1300   Periwound Area Dry 10/23/20 1300   Wound Edges Open 10/23/20 1300   Wound Length (cm) 2 cm 10/23/20 1300   Wound Width (cm) 3.8 cm 10/23/20 1300   Wound Depth (cm) 0.3 cm 10/23/20 1300   Wound Volume (cm^3) 2.28 cm^3 10/23/20 1300   Wound Surface Area (cm^2) 7.6 cm^2 10/23/20 1300   Care Cleansed with:;Soap and water;Sterile normal saline 10/23/20 1300   Dressing Applied;Methylene blue/gentian violet;Foam;Cast padding 10/23/20 1300   Off Loading Football dressing;Off loading shoe 10/23/20 1300   Dressing Change Due 10/27/20 10/23/20 1300           Plan:          Debridement: see procedure note   Dressings: per above  Offloading:Foam    Follow-up:Patient is to return to the clinic in 1 week  for follow-up but [facility] if any signs of infection, such as fever, chills, sweats, increased  redness or pain.    Short-term goals include maintaining good offloading and minimizing bioburden, promoting granulation and epithelialization to healing.  Long-term goals include keeping the wound healed by good offloading and medical management under the direction of internist.          Orders Placed This Encounter   Procedures    Change dressing     Left heel- gentian violet periwound, santyl to wound bed backed with HB transfer, mextra short X1, scott foam long X2, football dressing (cast padding X3, stockinet)        Follow up in about 4 days (around 10/27/2020) for wound care.

## 2020-10-23 NOTE — PROGRESS NOTES
10/23/20 1317   Hyperbaric Pre-Inspection   Mattress cleaned prior to treatment Yes   2 Patient Identifiers Verified Yes   Consent Obtained Yes   Cotton Gown Yes   Patient voided Yes   Drainage Bags Emptied Not applicable   Patient Pregnant No   Glasses, Jewelry, Makeup, etc. Removed Yes   Dentures, Hearing Aid, Prosthetic Devices Removed Yes   Touch hair to check for hair spray Yes   Cold/Flu Symptoms No   Diabetic Patient Eaten Yes   Medications Given Not applicable   Fears and apprehensions verbalized Yes   Ground Wire Secured Yes   HBO Treatment Course Details   Treatment Course Number 1   Ordering Provider Suman KAY   Indications Diabetic wounds of lower extremities   HBO Treatment Start Date 10/06/20   HBO Treatment Details   Treatment Number 7   Total Treatment Length Calc (minutes) 106   Chamber Type Monoplace   Chamber # 2   HBO Dive Log # 553   Treatment Protocol 2.0 ANAND x 90 minutes w/100% oxygen and no air break   Treatment Details   Dive Rate Down 2.0 psi/minute   Dive Rate Up 2.0 psi/minute   Compress Begins 2 ANAND   Clock Time 1029   Tx Pressure Reached 1 ANAND   Clock Time 1037   Decompress Begins 2 ANAND   Clock Time 1207   Decompress Ends 1 ANAND   Clock Time 1215   Pre HBO Vital Signs   BP (!) 158/74   Pulse 90   Resp 20   Temp 97.9 °F (36.6 °C)   Blood Glucose 190   Glucose Meter # wc   Pain Level 0   Post HBO Vital Signs   BP (!) 212/100   Pulse 80   Resp 20   Blood Glucose 151   Glucose Meter # wc   Action Taken Waited 10 minutes and repeated blood pressure as follows 195/93. pt is asymptomatic and denies any complaints or pain.   Pain Level 0         Arrived awake and alert. ABC's intact. Vitals as above. Cleared for treatment by Catherine AKY Tolerated treatment without complaints. Cleared from chamber without incidents. Follow up monday for HBO.       1. Diabetic ulcer of left heel associated with type 2 diabetes mellitus, with bone involvement without evidence of necrosis    2. Acute  osteomyelitis of left calcaneus         Physician Supervision  I provided direct supervision and was immediately available to furnish assistance and direction throughout the performance of the procedure.    Emergency Response Team  A trained emergency response team and emergency services were available throughout procedure.

## 2020-10-26 ENCOUNTER — OFFICE VISIT (OUTPATIENT)
Dept: VASCULAR SURGERY | Facility: CLINIC | Age: 55
End: 2020-10-26
Payer: MEDICAID

## 2020-10-26 VITALS
DIASTOLIC BLOOD PRESSURE: 78 MMHG | BODY MASS INDEX: 31.67 KG/M2 | WEIGHT: 190.06 LBS | SYSTOLIC BLOOD PRESSURE: 130 MMHG | HEIGHT: 65 IN

## 2020-10-26 DIAGNOSIS — Z95.828 S/P FEMORAL-POPLITEAL BYPASS SURGERY: ICD-10-CM

## 2020-10-26 DIAGNOSIS — M79.89 LEG SWELLING: ICD-10-CM

## 2020-10-26 DIAGNOSIS — I70.229 CRITICAL LOWER LIMB ISCHEMIA: Primary | ICD-10-CM

## 2020-10-26 PROCEDURE — 99999 PR PBB SHADOW E&M-EST. PATIENT-LVL IV: CPT | Mod: PBBFAC,,, | Performed by: SURGERY

## 2020-10-26 PROCEDURE — 99024 PR POST-OP FOLLOW-UP VISIT: ICD-10-PCS | Mod: ,,, | Performed by: SURGERY

## 2020-10-26 PROCEDURE — 99214 OFFICE O/P EST MOD 30 MIN: CPT | Mod: PBBFAC | Performed by: SURGERY

## 2020-10-26 PROCEDURE — 99024 POSTOP FOLLOW-UP VISIT: CPT | Mod: ,,, | Performed by: SURGERY

## 2020-10-26 PROCEDURE — 99999 PR PBB SHADOW E&M-EST. PATIENT-LVL IV: ICD-10-PCS | Mod: PBBFAC,,, | Performed by: SURGERY

## 2020-10-26 NOTE — PATIENT INSTRUCTIONS
Peripheral Artery Disease (PAD)     Peripheral artery disease (PAD) occurs when the arteries that carry blood to the limbs are narrowed or blocked. This is usually due to a buildup of a fatty substance called plaque in the walls of the arteries.  PAD most often affects the arteries in the legs. When these arteries are narrowed or blocked, blood flow to the legs is reduced. This can cause leg and foot pain and other symptoms. If severe enough, reduced blood flow to the legs can lead to tissue death (gangrene) and the loss of a toe, foot, or leg. Having PAD also makes it more likely that arteries in other body areas are blocked. For instance, arteries that carry blood to the heart or brain may be affected. This raises the chances of heart attack and stroke.  Risk factors  Certain factors can make PAD more likely. They include:  · Smoking  · Diabetes  · High blood pressure  · Unhealthy cholesterol levels  · Obesity  · Inactive lifestyle  · Older age  · Family history of PAD  Symptoms  Many people with PAD have no symptoms. If symptoms do occur, they can include:  · Pain in the muscles of the calves, thighs or hips that gets worse with activity and better with rest (intermittent claudication)  · Achy, tired, or heavy feeling in the legs  · Weakness, numbness, tingling, or loss of feeling in the legs  · Changes in skin color of the legs  · Sores on the legs and feet  · Cold leg, feet, or toes  · Pain the feet or toes even when lying down (rest pain)  Home care  PAD is a chronic (lifelong) condition. Treatment is focused on managing your condition and lowering your health risks. This may include doing the following:  · If you smoke, quit. This helps prevent further damage to your arteries and lowers your health risks. Ask your provider about medicines or products that can help you quit smoking. Also consider joining a stop-smoking program or support group.  · Be more active. This helps you lose weight and manage  problems such as high blood pressure and unhealthy cholesterol levels. Start a walking program if advised to by your provider. Your provider may also help you form a safe exercise program that is right for your needs.  · Make healthy eating changes. This includes eating less fat, salt, and sugar.  · Take medicines for high blood pressure, unhealthy cholesterol levels, and diabetes as directed.  · Have your blood pressure and cholesterol levels checked as often as directed.  · If you have diabetes, try to keep your blood sugar well controlled. Test your blood sugar as directed.  · If you are overweight, talk to your provider about forming a weight-loss plan.  · Watch for cuts, scrapes, or open sores on your feet. Poor blood flow to the feet may slow healing and increase the risk of infection from these problems.   Follow-up care  Follow up with your healthcare provider, or as advised. If imaging tests such as ultrasound were done, they will be reviewed by a doctor. You will be told the results and any new findings that may affect your care.  When to seek medical advice   Call your healthcare provider right away if any of these occur:  · Sudden severe pain in the legs or feet  · Sudden cold, pale or blue color in the legs or feet  · Weakness or numbness in the legs or feet that worsens  · Any sore or wound in the legs or feet that wont heal  · Weak pulse in your legs or feet  Know the Signs of Heart Attack and Stroke  People with PAD are at high risk for heart attack and stroke. Knowing the signs of these problems can help you protect your health and get help when you need it. Call 911 right away if you have any of the following:  Signs of a Heart Attack  · Chest discomfort, such as pain, aching, tightness, or pressure that lasts more than a few minutes, or that comes and goes  · Pain or discomfort in the arms, back, shoulders, neck, or jaw  · Shortness of breath  · Sweating (often a cold, clammy  sweat)  · Nausea  · Lightheadedness  Signs of a Stroke  · Sudden numbness or weakness of the face, arms, or legs, especially on one side  · Sudden confusion or trouble speaking or understanding  · Sudden trouble seeing in one or both eyes  · Sudden trouble walking, dizziness, or loss of balance  · Sudden, severe headache with no known cause   Date Last Reviewed: 9/21/2015  © 4779-3512 [employer]. [address] [place] [address], [place] [address]. All rights reserved. This information is not intended as a substitute for professional medical care. Always follow your healthcare professional's instructions.          Putting on Compression Stockings     Turn the stocking inside-out, then fit it over your toes and heel.          Roll the stocking up your leg.            Once stockings are on, make sure the top of the stocking is about two fingers width below the crease of the knee (or the groin if you wear thigh-high stockings).          Use equipment, such as a stocking will, or wear rubber gloves to make it easier to put on compression stockings.         Elastic compression stockings are prescribed to treat many vein problems. Wearing them may be the most important thing you do to manage your symptoms. The stockings fit tightly around your ankle, gradually reducing in pressure as they go up your legs. This helps keep blood flowing to your heart. As a result, swelling is reduced. Your healthcare provider will prescribe stockings at a safe pressure for you. He or she will also tell you how often to wear and remove the stockings. Follow these instructions closely. Also, do not buy or wear compression stockings without first seeing your healthcare provider.  Tips for wear and care  To wear stockings safely and to get the most benefit:  · Wear the length prescribed by your healthcare provider.  · Pull them to the designated height and no farther. Dont let them bunch at the top. This can restrict blood flow and  increase swelling.  · Wear the stockings for the amount of time your healthcare provider recommends. Replace them when they start to feel loose. This will likely be every 3 to 6 months.  · Remove them as your healthcare provider directs. When removed, wash your legs. Then check your legs and feet for sores. Call your healthcare provider if you find a sore. Dont put the stockings back on unless your healthcare provider directs.   · Wash the stockings as instructed. They may need to be hand-washed.  Date Last Reviewed: 5/1/2016  © 2780-6018 [employer]. [address] [place] [id number]. All rights reserved. This information is not intended as a substitute for professional medical care. Always follow your healthcare professional's instructions.        Tips for Using Less Salt    Most people with heart problems need to eat less salt (sodium). Reducing the amount of salt you eat may help control your blood pressure. The higher your blood pressure, the greater your risk for heart disease, stroke, blindness, and kidney problems.  At the store  · Make low-salt choices by reading labels carefully. Look for the total amount of sodium per serving.  · Use more fresh food. Buy more fruits and vegetables. Select lean meats, fish, and poultry.  · Use fewer frozen, canned, and packaged foods which often contain a lot of sodium.  · Use plain frozen vegetables without sauces or toppings. These products are often low- or no-sodium.  · Opt for reduced-sodium or no-salt-added versions of canned vegetables and soups.  In the kitchen  · Don't add salt to food when you're cooking. Season with flavorings such as onion, garlic, pepper, salt-free herbal blends, and lemon or lime juice.  · Use a cookbook containing low-salt recipes. It can give you ideas for tasty meals that are healthy for your heart.  · Sprinkle salt-free herbal blends on vegetables and meat.  · Drain and rinse canned foods, such as canned beans and  vegetables, before cooking or eating.  Eating out  · Tell the  you're on a low-salt diet. Ask questions about the menu.  · Order fish, chicken, and meat broiled, baked, poached, or grilled without salt, butter, or breading.  · Use lemon, pepper, and salt-free herb mixes to add flavor.  · Choose plain steamed rice, boiled noodles, and baked or boiled potatoes. Top potatoes with chives and a little sour cream.     Beware! Salt goes by many other names. Limit foods with these words listed as ingredients: salt, sodium, soy sauce, baking soda, baking powder, MSG, monosodium, Na (the chemical symbol for sodium). Some antacids are also high in salt.   Date Last Reviewed: 6/19/2015 © 2000-2017 [employer]. [address] [place] [address]. All rights reserved. This information is not intended as a substitute for professional medical care. Always follow your healthcare professional's instructions.        Low-Salt Diet  This diet removes foods that are high in salt. It also limits the amount of salt you use when cooking. It is most often used for people with high blood pressure, edema (fluid retention), and kidney, liver, or heart disease.  Table salt contains the mineral sodium. Your body needs sodium to work normally. But too much sodium can make your health problems worse. Your healthcare provider is recommending a low-salt (also called low-sodium) diet for you. Your total daily allowance of salt is 1,500 to 2,300 milligrams (mg). It is less than 1 teaspoon of table salt. This means you can have only about 500 to 700 mg of sodium at each meal. People with certain health problems should limit salt intake to the lower end of the recommended range.    When you cook, dont add much salt. If you can cook without using salt, even better. Dont add salt to your food at the table.  When shopping, read food labels. Salt is often called sodium on the label. Choose foods that are salt-free, low salt, or  very low salt. Note that foods with reduced salt may not lower your salt intake enough.    Beans, potatoes, and pasta  Ok: Dry beans, split peas, lentils, potatoes, rice, macaroni, pasta, spaghetti without added salt  Avoid: Potato chips, tortilla chips, and similar products  Breads and cereals  Ok: Low-sodium breads, rolls, cereals, and cakes; low-salt crackers, matzo crackers  Avoid: Salted crackers, pretzels, popcorn, Swedish toast, pancakes, muffins  Dairy  Ok: Milk, chocolate milk, hot chocolate mix, low-salt cheeses, and yogurt  Avoid: Processed cheese and cheese spreads; Roquefort, Camembert, and cottage cheese; buttermilk, instant breakfast drink  Desserts  Ok: Ice cream, frozen yogurt, juice bars, gelatin, cookies and pies, sugar, honey, jelly, hard candy  Avoid: Most pies, cakes and cookies prepared or processed with salt; instant pudding  Drinks  Ok: Tea, coffee, fizzy (carbonated) drinks, juices  Avoid: Flavored coffees, electrolyte replacement drinks, sports drinks  Meats  Ok: All fresh meat, fish, poultry, low-salt tuna, eggs, egg substitute  Avoid: Smoked, pickled, brine-cured, or salted meats and fish. This includes salazar, chipped beef, corned beef, hot dogs, deli meats, ham, kosher meats, salt pork, sausage, canned tuna, salted codfish, smoked salmon, herring, sardines, or anchovies.  Seasonings and spices  Ok: Most seasonings are okay. Good substitutes for salt include: fresh herb blends, hot sauce, lemon, garlic, baumann, vinegar, dry mustard, parsley, cilantro, horseradish, tomato paste, regular margarine, mayonnaise, unsalted butter, cream cheese, vegetable oil, cream, low-salt salad dressing and gravy.  Avoid: Regular ketchup, relishes, pickles, soy sauce, teriyaki sauce, Worcestershire sauce, BBQ sauce, tartar sauce, meat tenderizer, chili sauce, regular gravy, regular salad dressing, salted butter  Soups  Ok: Low-salt soups and broths made with allowed foods  Avoid: Bouillon cubes, soups with  smoked or salted meats, regular soup and broth  Vegetables  Ok: Most vegetables are okay; also low-salt tomato and vegetable juices  Avoid: Sauerkraut and other brine-soaked vegetables; pickles and other pickled vegetables; tomato juice, olives  Date Last Reviewed: 8/1/2016  © 9271-1547 [email or web address]. [address] [place] [address], [place] [address]. All rights reserved. This information is not intended as a substitute for professional medical care. Always follow your healthcare professional's instructions.        Low-Salt Choices  Eating salt (sodium) can make your body retain too much water. Excess water makes your heart work harder. Canned, packaged, and frozen foods are easy to prepare, but they are often high in sodium. Here are some ideas for low-salt foods you can easily prepare yourself.    For breakfast  · Fruit or 100% fruit juice  · Whole-wheat bread or an English muffin. Compare sodium content on labels.  · Low-fat milk or yogurt  · Unsalted eggs  · Shredded wheat  · Corn tortillas  · Unsalted steamed rice  · Regular (not instant) hot cereal, made without salt  Stay away from:  · Sausage, salazar, and ham  · Flour tortillas  · Packaged muffins, pancakes, and biscuits  · Instant hot cereals  · Cottage cheese  For lunch and dinner  · Fresh fish, chicken, turkey, or meat--baked, broiled, or roasted without salt  · Dry beans, cooked without salt  · Tofu, stir-fried without salt  · Unsalted fresh fruit and vegetables, or frozen or canned fruit and vegetables with no added salt  Stay away from:  · Lunch or deli meat that is cured or smoked  · Cheese  · Tomato juice and catsup  · Canned vegetables, soups, and fish not labeled as no-salt-added or reduced sodium  · Packaged gravies and sauces  · Olives, pickles, and relish  · Bottled salad dressings  For snacks and desserts  · Yogurt  · Unsalted, air popped popcorn  · Unsalted nuts or seeds  Stay away from:  · Pies and cakes  · Packaged dessert  mixes  · Pizza  · Canned and packaged puddings  · Pretzels, chips, crackers, and nuts--unless the label says unsalted  Date Last Reviewed: 6/17/2015  © 9723-4538 [employer]. [address] [place] [address], [place] [address]. All rights reserved. This information is not intended as a substitute for professional medical care. Always follow your healthcare professional's instructions.

## 2020-10-26 NOTE — PROGRESS NOTES
Teddy Huber MD [facility]                         10/26/2020    HPI:  Suyapa Connelly is a 55 y.o. female with   Patient Active Problem List   Diagnosis    Peripheral artery disease    Peripheral vascular disease    PAD (peripheral artery disease)    Hypokalemia, gastrointestinal losses    Type 2 diabetes mellitus without complication, with long-term current use of insulin    Panic attack as reaction to stress    Malnutrition of moderate degree    Essential hypertension    Vitamin D deficiency    CAROLIN (generalized anxiety disorder)    MDD (major depressive disorder), recurrent episode, moderate    Bereavement    Injury to kidney    Chronic combined systolic and diastolic heart failure    Mixed hyperlipidemia    Dilated cardiomyopathy    Acute coronary syndrome    Acute on chronic combined systolic (congestive) and diastolic (congestive) heart failure    Type 2 diabetes mellitus with hyperglycemia, with long-term current use of insulin    Coronary artery disease involving native coronary artery of native heart without angina pectoris    Contrast dye induced nephropathy    Weakness    S/P CABG x 1    Acute blood loss anemia    Paroxysmal atrial fibrillation    Alteration in skin integrity in adult    Preventive measure    Ischemic cardiomyopathy    Non healing left heel wound    Acute osteomyelitis of left calcaneus    Stage 3 chronic kidney disease    Critical lower limb ischemia    S/P femoral-popliteal bypass surgery    Diabetic ulcer of left heel associated with type 2 diabetes mellitus, with bone involvement without evidence of necrosis    Ischemia of lower extremity    being managed by PCP and specialists who is here today for evaluation of PVD.  S/p L fem-BK pop prosthetic bypass.  Doing well.  Patient has no complaints of claudication.  She does have c/o LLE edema. Patient states location is LLE occurring for a few  weeks.  Associated signs and symptoms include decreased ROM although she is ambulating well and had her L foot wound debrided recently with good outcomes.  Quality is heavy and severity is 5/10.  Symptoms began a few weeks ago.  Alleviating factors include elevation.  Worsening factors include dependency.  No rest pain.  + tissue loss.  Patient is diabetic.  Is not on Pletal.  + previous lower extremity interventions.    no MI  no Stroke  Tobacco use: no  Daily Aspirin: yes  Anticoagulation: no    10/2020:  No new complaints.  States LLE edema improved.  Receiving HBO.    10/26/20:  Doing well without complaints.    Past Medical History:   Diagnosis Date    Anxiety     Depression     Diabetes mellitus     type 2    Diabetes mellitus, type 2     GERD (gastroesophageal reflux disease)     Hyperlipemia     Hypertension     Myocardial infarction 2010    minor-caused by stress per pt.    Vaginal delivery     x1     Past Surgical History:   Procedure Laterality Date    Angiogram - Right Extremity Right 7/9/15    angiogram-left leg  10/6/15    CATHETERIZATION OF BOTH LEFT AND RIGHT HEART N/A 12/18/2019    Procedure: CATHETERIZATION, HEART, BOTH LEFT AND RIGHT;  Surgeon: Que Fernando III, MD;  Location: [facility] CATH LAB;  Service: Cardiology;  Laterality: N/A;    CORONARY ANGIOGRAPHY N/A 12/18/2019    Procedure: ANGIOGRAM, CORONARY ARTERY;  Surgeon: Que Fernando III, MD;  Location: [facility] CATH LAB;  Service: Cardiology;  Laterality: N/A;    CORONARY ANGIOGRAPHY INCLUDING BYPASS GRAFTS WITH CATHETERIZATION OF LEFT HEART N/A 7/28/2020    Procedure: ANGIOGRAM, CORONARY, INCLUDING BYPASS GRAFT, WITH LEFT HEART CATHETERIZATION, 9 am;  Surgeon: Rachel Easley MD;  Location: [facility] CATH LAB;  Service: Cardiology;  Laterality: N/A;    CORONARY ARTERY BYPASS GRAFT (CABG) N/A 1/14/2020    Procedure: CORONARY ARTERY BYPASS GRAFT (CABG) x 1     Off Pump;  Surgeon: Huang Altamirano MD;  Location: [facility] OR [address] [place] [address];   Service: Cardiovascular;  Laterality: N/A;    CREATION OF FEMOROPOPLITEAL ARTERIAL BYPASS USING GRAFT Left 8/18/2020    Procedure: CREATION, BYPASS, ARTERIAL, FEMORAL TO POPLITEAL, USING GRAFT, LEFT LOWER EXTREMITY;  Surgeon: Teddy Huber MD;  Location: [facility];  Service: Vascular;  Laterality: Left;  REQUEST 7:15 A.M. START----COVID NEGATIVE ON 8/17  1ST CASE STARTE PER LEANA ON 8/7/2020 @ 942AM-LO  RN PREOP 8/12/2020   T/S-----CLEARED BY CARDS-------PENDING INSURANCE    DEBRIDEMENT OF FOOT Left 9/8/2020    Procedure: DEBRIDEMENT, FOOT;  Surgeon: Rosio Mayes DPM;  Location: [facility] OR;  Service: Podiatry;  Laterality: Left;  request neoxx .   RN Pre Op 9-4-2020, Covid negative on 9/5/20. C A    INSERTION OF TUNNELED CENTRAL VENOUS HEMODIALYSIS CATHETER N/A 1/27/2020    Procedure: Insertion, Catheter, Central Venous, Hemodialysis;  Surgeon: ESTEBAN Gomez III, MD;  Location: [facility] CATH LAB;  Service: Peripheral Vascular;  Laterality: N/A;     Family History   Problem Relation Age of Onset    Anesthesia problems Neg Hx      Social History     Socioeconomic History    Marital status: Legally      Spouse name: Not on file    Number of children: Not on file    Years of education: Not on file    Highest education level: Not on file   Occupational History    Not on file   Social Needs    Financial resource strain: Not on file    Food insecurity     Worry: Not on file     Inability: Not on file    Transportation needs     Medical: Not on file     Non-medical: Not on file   Tobacco Use    Smoking status: Never Smoker    Smokeless tobacco: Never Used   Substance and Sexual Activity    Alcohol use: No    Drug use: Yes     Types: Marijuana     Comment: occassional    Sexual activity: Not on file   Lifestyle    Physical activity     Days per week: Not on file     Minutes per session: Not on file    Stress: Not on file   Relationships    Social connections     Talks on phone: Not on file      Gets together: Not on file     Attends Islam service: Not on file     Active member of club or organization: Not on file     Attends meetings of clubs or organizations: Not on file     Relationship status: Not on file   Other Topics Concern    Not on file   Social History Narrative    Not on file       Current Outpatient Medications:     allopurinoL (ZYLOPRIM) 100 MG tablet, Take 1 tablet (100 mg total) by mouth once daily., Disp: 30 tablet, Rfl: 5    amiodarone (PACERONE) 200 MG Tab, Take 1 tablet (200 mg total) by mouth once daily., Disp: 90 tablet, Rfl: 3    aspirin 81 MG Chew, Take 1 tablet (81 mg total) by mouth once daily., Disp: 90 tablet, Rfl: 3    atorvastatin (LIPITOR) 80 MG tablet, Take 1 tablet (80 mg total) by mouth once daily., Disp: 30 tablet, Rfl: 5    carvediloL (COREG) 6.25 MG tablet, Take 6.25 mg by mouth once daily. Pt thinks she take BID. Unsure., Disp: , Rfl:     clopidogreL (PLAVIX) 75 mg tablet, Take 1 tablet (75 mg total) by mouth once daily., Disp: 30 tablet, Rfl: 11    hydrALAZINE (APRESOLINE) 25 MG tablet, Take 1 tablet (25 mg total) by mouth every 8 (eight) hours., Disp: 90 tablet, Rfl: 5    insulin glargine 100 units/mL (3mL) SubQ pen, Inject 10 Units into the skin every evening. (Patient taking differently: Inject 10 Units into the skin every evening. Lantus), Disp: 9 mL, Rfl: 1    metoprolol succinate (TOPROL-XL) 50 MG 24 hr tablet, , Disp: , Rfl:     multivitamin (THERAGRAN) per tablet, Take 1 tablet by mouth once daily., Disp: , Rfl:     oxyCODONE-acetaminophen (PERCOCET) 5-325 mg per tablet, Take 1 tablet by mouth every 6 (six) hours as needed for Pain., Disp: 28 tablet, Rfl: 0    sevelamer carbonate (RENVELA) 800 mg Tab, Take 1 tablet (800 mg total) by mouth 3 (three) times daily with meals., Disp: 90 tablet, Rfl: 11    acetaminophen (TYLENOL) 500 MG tablet, Take 1,000 mg by mouth daily as needed for Pain., Disp: , Rfl:     blood sugar diagnostic (ONETOUCH  ULTRA BLUE TEST STRIP) Strp, Use to test blood glucose twice daily (Patient not taking: Reported on 9/14/2020), Disp: 100 each, Rfl: 11    collagenase (SANTYL) ointment, Apply topically once daily. (Patient not taking: Reported on 10/26/2020), Disp: 250 g, Rfl: 0    escitalopram oxalate (LEXAPRO) 10 MG tablet, Take 1 tablet (10 mg total) by mouth once daily. (Patient not taking: Reported on 10/5/2020), Disp: 30 tablet, Rfl: 5    gabapentin (NEURONTIN) 300 MG capsule, Take 1 capsule (300 mg total) by mouth 3 (three) times daily. (Patient not taking: Reported on 10/26/2020), Disp: 90 capsule, Rfl: 5    insulin aspart U-100 (NOVOLOG) 100 unit/mL injection, Inject 9 Units into the skin 3 (three) times daily with meals., Disp: 10 mL, Rfl: 5    lancets 30 gauge Misc, use to test blood glucose 2 (two) times daily with meals. (Patient not taking: Reported on 9/14/2020), Disp: 100 each, Rfl: 11    ondansetron (ZOFRAN-ODT) 4 MG TbDL, Take 1 tablet (4 mg total) by mouth every 12 (twelve) hours as needed (vomiting). (Patient not taking: Reported on 9/14/2020), Disp: 10 tablet, Rfl: 0    oxyCODONE-acetaminophen (PERCOCET) 5-325 mg per tablet, Take 1 tablet by mouth every 6 (six) hours as needed for Pain. (Patient not taking: Reported on 9/14/2020), Disp: 25 tablet, Rfl: 0    oxyCODONE-acetaminophen (PERCOCET) 5-325 mg per tablet, Take 1 tablet by mouth every 6 (six) hours as needed for Pain. (Patient not taking: Reported on 10/26/2020), Disp: 28 tablet, Rfl: 0    piperacillin sodium/tazobactam (PIPERACILLIN-TAZOBACTAM 4.5G/100ML SODIUM CHLORIDE 0.9%-READY TO MIX), Inject 100 mLs (4.5 g total) into the vein every 8 (eight) hours. (Patient not taking: Reported on 9/30/2020), Disp:  , Rfl:     torsemide (DEMADEX) 20 MG Tab, TAKE 2 TABLETS BY MOUTH ONCE DAILY (Patient not taking: Reported on 9/30/2020), Disp: 180 tablet, Rfl: 3    REVIEW OF SYSTEMS:  General: No fevers or chills; ENT: No sore throat; Allergy and  Immunology: no persistent infections; Hematological and Lymphatic: No history of bleeding or easy bruising; Endocrine: negative; Respiratory: no cough, shortness of breath, or wheezing; Cardiovascular: no chest pain or dyspnea on exertion; no claudication, no rest pain; Gastrointestinal: no abdominal pain/back, change in bowel habits, or bloody stools; Genito-Urinary: no dysuria, trouble voiding, or hematuria; Musculoskeletal: negative, + wound; Neurological: no TIA or stroke symptoms; Psychiatric: no nervousness, anxiety or depression.    PHYSICAL EXAM:                General appearance:  Alert, well-appearing, and in no distress.  Oriented to person, place, and time                    Neurological: Normal speech, no focal findings noted; CN II - XII grossly intact. RLE with sensation to light touch, LLE with sensation to light touch.            Musculoskeletal: Digits/nail without cyanosis/clubbing.  Strength 5/5 BLE.                    Neck: Supple, no significant adenopathy, no carotid bruit can be auscultated                  Chest:  Clear to auscultation, no wheezes, rales or rhonchi, symmetric air entry. No use of accessory muscles               Cardiac: Normal rate and regular rhythm, S1 and S2 normal            Abdomen: Soft, nontender, nondistended, no masses or organomegaly, no hernia     No rebound tenderness noted; bowel sounds normal     Pulsatile aortic mass is non palpable.     No groin adenopathy      Extremities:2+ R femoral pulse, 2+ L femoral pulse     1+ R popliteal pulse, non palp L popliteal pulse     1+ R PT pulse, 1+ L PT pulse     1+ R DP pulse, 1+ L DP pulse     no RLE edema, 2+ LLE edema    Skin: RLE without tissue loss; LLE with L heel tissue loss +granulation and min fibrionous tissue, no surrounding infection; L groin inc nearly fully healed, L leg inc fully healed    LAB RESULTS:  No results found for: CBC  Lab Results   Component Value Date    LABPROT 10.3 08/24/2020    INR 0.9  08/24/2020     Lab Results   Component Value Date     08/24/2020    K 3.4 (L) 08/24/2020     08/24/2020    CO2 20 (L) 08/24/2020     (H) 08/24/2020    BUN 18 08/24/2020    CREATININE 1.5 (H) 08/24/2020    CALCIUM 8.5 (L) 08/24/2020    ANIONGAP 11 08/24/2020    EGFRNONAA 39 (A) 08/24/2020     Lab Results   Component Value Date    WBC 8.09 08/24/2020    RBC 2.35 (L) 08/24/2020    HGB 7.4 (L) 08/24/2020    HCT 23.5 (L) 08/24/2020     (H) 08/24/2020    MCH 31.5 (H) 08/24/2020    MCHC 31.5 (L) 08/24/2020    RDW 14.1 08/24/2020     (H) 08/24/2020    MPV 10.7 08/24/2020    GRAN 4.6 08/24/2020    GRAN 56.2 08/24/2020    LYMPH 1.9 08/24/2020    LYMPH 24.0 08/24/2020    MONO 1.0 08/24/2020    MONO 11.9 08/24/2020    EOS 0.5 08/24/2020    BASO 0.06 08/24/2020    EOSINOPHIL 6.6 08/24/2020    BASOPHIL 0.7 08/24/2020    DIFFMETHOD Automated 08/24/2020     .  Lab Results   Component Value Date    HGBA1C 10.2 (H) 08/26/2020       IMAGING:  All pertinent imaging has been reviewed and interpreted independently.    ATTILA / TP 9/2020:  ATTILA NA/1.8, TP 29/128    LLE arterial US with patent bypass and 50% stenosis of distal anastomosis    10/2020:  No LLE DVT, simple fluid collection L groin without evidence of flow or rind    IMP/PLAN:  55 y.o. female with   Patient Active Problem List   Diagnosis    Peripheral artery disease    Peripheral vascular disease    PAD (peripheral artery disease)    Hypokalemia, gastrointestinal losses    Type 2 diabetes mellitus without complication, with long-term current use of insulin    Panic attack as reaction to stress    Malnutrition of moderate degree    Essential hypertension    Vitamin D deficiency    CAROLIN (generalized anxiety disorder)    MDD (major depressive disorder), recurrent episode, moderate    Bereavement    Injury to kidney    Chronic combined systolic and diastolic heart failure    Mixed hyperlipidemia    Dilated cardiomyopathy    Acute  coronary syndrome    Acute on chronic combined systolic (congestive) and diastolic (congestive) heart failure    Type 2 diabetes mellitus with hyperglycemia, with long-term current use of insulin    Coronary artery disease involving native coronary artery of native heart without angina pectoris    Contrast dye induced nephropathy    Weakness    S/P CABG x 1    Acute blood loss anemia    Paroxysmal atrial fibrillation    Alteration in skin integrity in adult    Preventive measure    Ischemic cardiomyopathy    Non healing left heel wound    Acute osteomyelitis of left calcaneus    Stage 3 chronic kidney disease    Critical lower limb ischemia    S/P femoral-popliteal bypass surgery    Diabetic ulcer of left heel associated with type 2 diabetes mellitus, with bone involvement without evidence of necrosis    Ischemia of lower extremity    being managed by PCP and specialists who is here today for evaluation of PVD.    -s/p L fem-pop prosthetic bypass 8/18/20, recovering well, leg staples removed today, groin staples removed. - keep inc clean/dry, cont daily ASA  -Cont wound care and offloading shoe  -Exercise  -Heart healthy lifestyle  -RTC 4 weeks for wound check    I spent 11 minutes evaluating this patient and greater than 50% of the time was spent counseling, coordinator care and discussing the plan of care.  All questions were answered and patient stated understanding with agreement with the above treatment plan.    Teddy Huber MD [facility]  Vascular and Endovascular Surgery

## 2020-10-26 NOTE — LETTER
October 26, 2020        [place]  [address] [facility] [address]             [facility]  [address]  [facility] [address]  Phone: [phone]  Fax: [phone]   Patient: Suyapa Connelly   MR Number: [id number]   YOB: 1965   Date of Visit: 10/26/2020       Dear Dr. Rahman:    Thank you for referring Suyapa Connelly to me for evaluation. Below are the relevant portions of my assessment and plan of care.            If you have questions, please do not hesitate to call me. I look forward to following Suyapa along with you.    Sincerely,      Teddy Huber MD           CC  No Recipients

## 2020-10-27 ENCOUNTER — HOSPITAL ENCOUNTER (OUTPATIENT)
Dept: WOUND CARE | Facility: HOSPITAL | Age: 55
Discharge: HOME | End: 2020-10-27
Attending: FAMILY MEDICINE
Payer: MEDICAID

## 2020-10-27 VITALS
SYSTOLIC BLOOD PRESSURE: 190 MMHG | RESPIRATION RATE: 20 BRPM | TEMPERATURE: 98 F | HEART RATE: 80 BPM | DIASTOLIC BLOOD PRESSURE: 92 MMHG

## 2020-10-27 DIAGNOSIS — L97.426 DIABETIC ULCER OF LEFT HEEL ASSOCIATED WITH TYPE 2 DIABETES MELLITUS, WITH BONE INVOLVEMENT WITHOUT EVIDENCE OF NECROSIS: Primary | ICD-10-CM

## 2020-10-27 DIAGNOSIS — E11.621 DIABETIC ULCER OF LEFT HEEL ASSOCIATED WITH TYPE 2 DIABETES MELLITUS, WITH BONE INVOLVEMENT WITHOUT EVIDENCE OF NECROSIS: Primary | ICD-10-CM

## 2020-10-27 DIAGNOSIS — M86.172 ACUTE OSTEOMYELITIS OF LEFT CALCANEUS: ICD-10-CM

## 2020-10-27 LAB
POCT GLUCOSE: 161 MG/DL (ref 70–110)
POCT GLUCOSE: 96 MG/DL (ref 70–110)

## 2020-10-27 PROCEDURE — 99183 HYPERBARIC OXYGEN THERAPY: CPT | Mod: ,,, | Performed by: FAMILY MEDICINE

## 2020-10-27 PROCEDURE — 99183 PR HYPERBARIC OXYGEN THERAPY ATTENDANCE/SUPERVISION, PER SESSION: ICD-10-PCS | Mod: ,,, | Performed by: FAMILY MEDICINE

## 2020-10-27 PROCEDURE — 82962 GLUCOSE BLOOD TEST: CPT | Performed by: FAMILY MEDICINE

## 2020-10-27 PROCEDURE — G0277 HBOT, FULL BODY CHAMBER, 30M: HCPCS | Performed by: FAMILY MEDICINE

## 2020-10-27 PROCEDURE — 99213 OFFICE O/P EST LOW 20 MIN: CPT | Mod: 25

## 2020-10-27 NOTE — PROGRESS NOTES
10/27/20 1534   Hyperbaric Pre-Inspection   Mattress cleaned prior to treatment Yes   2 Patient Identifiers Verified Yes   Consent Obtained Yes   Cotton Gown Yes   Patient voided Yes   Patient Pregnant No   Glasses, Jewelry, Makeup, etc. Removed Yes   Dentures, Hearing Aid, Prosthetic Devices Removed Yes   Touch hair to check for hair spray Yes   Cold/Flu Symptoms No   Diabetic Patient Eaten Yes   Medications Given Not applicable   Fears and apprehensions verbalized Yes   Ground Wire Secured Yes   HBO Treatment Course Details   Treatment Course Number 1   Ordering Provider Suman KAY   Indications Diabetic wounds of lower extremities   HBO Treatment Start Date 10/06/20   HBO Treatment Details   Treatment Number 8   Inpatient Visit No   Total Treatment Length Calc (minutes) 106   Chamber Type Monoplace   Chamber # 2   HBO Dive Log # [id number]   Treatment Protocol 2.0 ANAND x 90 minutes w/100% oxygen and no air break   Treatment Details   Dive Rate Down 2.0 psi/minute   Dive Rate Up 2.0 psi/minute   Compress Begins 1 ANAND   Clock Time 1327   Tx Pressure Reached 2 ANAND   Clock Time 1335   Decompress Begins 2 ANAND   Clock Time 1505   Decompress Ends 1 ANAND   Clock Time 1513   Pre HBO Vital Signs   BP (!) 147/70   Pulse 93   Resp 20   Temp 97.7 °F (36.5 °C)   Blood Glucose 161   Glucose Meter # wc   Pain Level 0   Post HBO Vital Signs   BP (!) 206/95   Pulse 87   Resp 20   Blood Glucose 96   Glucose Meter # wc   Action Taken repeat blood pressure after 5 mins: 190/92   Pain Level 0       Arrived awake and alert. ABC's intact. Vitals as above. Cleared for treatment by Catherine KAY Tolerated treatment without complaints. Cleared from chamber without incidents. Follow up tomorrow for HBO.     1. Diabetic ulcer of left heel associated with type 2 diabetes mellitus, with bone involvement without evidence of necrosis    2. Acute osteomyelitis of left calcaneus         Physician Supervision  I provided direct supervision and was  immediately available to furnish assistance and direction throughout the performance of the procedure.    Emergency Response Team  A trained emergency response team and emergency services were available throughout procedure.

## 2020-10-27 NOTE — PROGRESS NOTES
[facility]  [address]  [address]  Nurse Visit    Subjective:       Patient seen in clinic today.  Dressing changed as ordered.      Assessment:          Incision/Site 09/08/20 1316 Left Heel (Active)   09/08/20 1316   Present Prior to Hospital Arrival?:    Side: Left   Location: Heel   Orientation:    Incision Type:    Closure Method:    Additional Comments:    Removal Indication and Assessment:    Wound Outcome:    Removal Indications:    Incision WDL ex 10/27/20 1500   Dressing Appearance Intact;Moist drainage 10/27/20 1500   Drainage Amount Moderate 10/27/20 1500   Drainage Characteristics/Odor Serosanguineous 10/27/20 1500   Appearance Pink;Red;Yellow 10/27/20 1500   Black (%), Wound Tissue Color 0 % 10/27/20 1500   Red (%), Wound Tissue Color 80 % 10/27/20 1500   Yellow (%), Wound Tissue Color 20 % 10/27/20 1500   Periwound Area Dry 10/27/20 1500   Wound Edges Open 10/27/20 1500   Care Cleansed with:;Soap and water;Sterile normal saline 10/27/20 1500   Dressing Applied;Methylene blue/gentian violet;Foam;Cast padding 10/27/20 1500   Off Loading Football dressing;Off loading shoe 10/27/20 1500   Dressing Change Due 10/30/20 10/27/20 1500           Plan:     No orders of the defined types were placed in this encounter.          Follow up in about 3 days (around 10/30/2020) for wound care.

## 2020-11-02 ENCOUNTER — TELEPHONE (OUTPATIENT)
Dept: WOUND CARE | Facility: HOSPITAL | Age: 55
End: 2020-11-02

## 2020-11-02 NOTE — PROCEDURES
Wound Debridement    Date/Time: 10/16/2020 1:30 PM  Performed by: Rosio Mayes DPM  Authorized by: Rosio Mayes DPM     Consent Done?:  Yes (Written)    Wound Details:    Location:  Left foot    Location:  Left Heel    Type of Debridement:  Excisional       Length (cm):  1.7       Area (sq cm):  5.95       Width (cm):  3.5       Percent Debrided (%):  100       Depth (cm):  0.6       Total Area Debrided (sq cm):  5.95    Depth of debridement:  Subcutaneous tissue    Tissue debrided:  Dermis    Devitalized tissue debrided:  Biofilm, Callus, Exudate and Fibrin    Instruments:  Curette and Blade    Bleeding:  Minimal  Hemostasis Achieved: Yes    Method Used:  Pressure  Patient tolerance:  Patient tolerated the procedure well with no immediate complications

## 2020-11-04 ENCOUNTER — HOSPITAL ENCOUNTER (OUTPATIENT)
Dept: WOUND CARE | Facility: HOSPITAL | Age: 55
Discharge: HOME | End: 2020-11-04
Attending: FAMILY MEDICINE
Payer: MEDICAID

## 2020-11-04 VITALS
SYSTOLIC BLOOD PRESSURE: 174 MMHG | RESPIRATION RATE: 20 BRPM | DIASTOLIC BLOOD PRESSURE: 79 MMHG | HEART RATE: 88 BPM | TEMPERATURE: 98 F

## 2020-11-04 VITALS
DIASTOLIC BLOOD PRESSURE: 106 MMHG | HEART RATE: 87 BPM | SYSTOLIC BLOOD PRESSURE: 218 MMHG | RESPIRATION RATE: 20 BRPM | TEMPERATURE: 98 F

## 2020-11-04 DIAGNOSIS — E11.621 DIABETIC ULCER OF LEFT HEEL ASSOCIATED WITH TYPE 2 DIABETES MELLITUS, WITH BONE INVOLVEMENT WITHOUT EVIDENCE OF NECROSIS: Primary | ICD-10-CM

## 2020-11-04 DIAGNOSIS — L97.426 DIABETIC ULCER OF LEFT HEEL ASSOCIATED WITH TYPE 2 DIABETES MELLITUS, WITH BONE INVOLVEMENT WITHOUT EVIDENCE OF NECROSIS: Primary | ICD-10-CM

## 2020-11-04 DIAGNOSIS — M86.172 ACUTE OSTEOMYELITIS OF LEFT CALCANEUS: ICD-10-CM

## 2020-11-04 LAB — POCT GLUCOSE: 252 MG/DL (ref 70–110)

## 2020-11-04 PROCEDURE — 99213 OFFICE O/P EST LOW 20 MIN: CPT

## 2020-11-04 PROCEDURE — 82962 GLUCOSE BLOOD TEST: CPT | Performed by: FAMILY MEDICINE

## 2020-11-04 NOTE — PROGRESS NOTES
Arrived awake and alert. ABC's intact.Vitals as above.  glucose was 252 mg/dl KATHLEEN Cortes M.D notified. Waited approximately 10 minutes and took manual blood pressure of 180/90. Pt was advised that her blood pressure was too high to treat today. She was instructed to make an appointment with her cardiologist. Follow up tomorrow for HBO.      ICD-10-CM ICD-9-CM   1. 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 Acute osteomyelitis of left calcaneus  M86.172 730.07

## 2020-11-04 NOTE — PROGRESS NOTES
[facility]  [address]  [address]  Nurse Visit    Subjective:       Patient seen in clinic today.  Dressing changed as ordered.      Assessment:          Incision/Site 09/08/20 1316 Left Heel (Active)   09/08/20 1316   Present Prior to Hospital Arrival?:    Side: Left   Location: Heel   Orientation:    Incision Type:    Closure Method:    Additional Comments:    Removal Indication and Assessment:    Wound Outcome:    Removal Indications:    Incision WDL ex 11/04/20 1300   Dressing Appearance Intact;Dried drainage 11/04/20 1300   Drainage Amount Moderate 11/04/20 1300   Drainage Characteristics/Odor Serosanguineous 11/04/20 1300   Appearance Red;White 11/04/20 1300   Black (%), Wound Tissue Color 0 % 11/04/20 1300   Red (%), Wound Tissue Color 80 % 11/04/20 1300   Yellow (%), Wound Tissue Color 20 % 11/04/20 1300   Periwound Area Macerated 11/04/20 1300   Wound Edges Open 11/04/20 1300   Care Cleansed with:;Soap and water;Sterile normal saline 11/04/20 1300   Dressing Applied;Methylene blue/gentian violet;Foam 11/04/20 1300   Off Loading Football dressing;Off loading shoe 11/04/20 1300   Dressing Change Due 11/06/20 11/04/20 1300           Plan:     No orders of the defined types were placed in this encounter.          Follow up in about 2 days (around 11/6/2020) for wound care.

## 2020-11-05 ENCOUNTER — HOSPITAL ENCOUNTER (OUTPATIENT)
Dept: WOUND CARE | Facility: HOSPITAL | Age: 55
Discharge: HOME | End: 2020-11-05
Attending: INTERNAL MEDICINE
Payer: MEDICAID

## 2020-11-05 VITALS
SYSTOLIC BLOOD PRESSURE: 144 MMHG | HEART RATE: 86 BPM | DIASTOLIC BLOOD PRESSURE: 68 MMHG | RESPIRATION RATE: 20 BRPM | TEMPERATURE: 98 F

## 2020-11-05 DIAGNOSIS — M86.172 ACUTE OSTEOMYELITIS OF LEFT CALCANEUS: ICD-10-CM

## 2020-11-05 DIAGNOSIS — E11.621 DIABETIC ULCER OF LEFT HEEL ASSOCIATED WITH TYPE 2 DIABETES MELLITUS, WITH BONE INVOLVEMENT WITHOUT EVIDENCE OF NECROSIS: Primary | ICD-10-CM

## 2020-11-05 DIAGNOSIS — L97.426 DIABETIC ULCER OF LEFT HEEL ASSOCIATED WITH TYPE 2 DIABETES MELLITUS, WITH BONE INVOLVEMENT WITHOUT EVIDENCE OF NECROSIS: Primary | ICD-10-CM

## 2020-11-05 LAB
POCT GLUCOSE: 110 MG/DL (ref 70–110)
POCT GLUCOSE: 90 MG/DL (ref 70–110)
POCT GLUCOSE: 96 MG/DL (ref 70–110)

## 2020-11-05 PROCEDURE — 82962 GLUCOSE BLOOD TEST: CPT | Performed by: INTERNAL MEDICINE

## 2020-11-05 NOTE — PROGRESS NOTES
Arrived awake and alert. ABC's intact. Vitals as above. Initial glucose 110 mg/dl. Pt ate two jolly rancher candies and refused juice. waited approximately 30 minutes recheck glucose at 90 mg/dl, Tenzin notified. He related the patient will not be treating today and to follow up tomorrow for HBO.      ICD-10-CM ICD-9-CM   1. 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 Acute osteomyelitis of left calcaneus  M86.172 730.07

## 2020-11-06 ENCOUNTER — HOSPITAL ENCOUNTER (OUTPATIENT)
Dept: WOUND CARE | Facility: HOSPITAL | Age: 55
Discharge: HOME | End: 2020-11-06
Attending: PODIATRIST
Payer: MEDICAID

## 2020-11-06 ENCOUNTER — HOSPITAL ENCOUNTER (OUTPATIENT)
Dept: WOUND CARE | Facility: HOSPITAL | Age: 55
Discharge: HOME | End: 2020-11-06
Attending: FAMILY MEDICINE
Payer: MEDICAID

## 2020-11-06 DIAGNOSIS — M86.172 ACUTE OSTEOMYELITIS OF LEFT CALCANEUS: ICD-10-CM

## 2020-11-06 DIAGNOSIS — L97.426 DIABETIC ULCER OF LEFT HEEL ASSOCIATED WITH TYPE 2 DIABETES MELLITUS, WITH BONE INVOLVEMENT WITHOUT EVIDENCE OF NECROSIS: Primary | ICD-10-CM

## 2020-11-06 DIAGNOSIS — E11.621 DIABETIC ULCER OF LEFT HEEL ASSOCIATED WITH TYPE 2 DIABETES MELLITUS, WITH BONE INVOLVEMENT WITHOUT EVIDENCE OF NECROSIS: Primary | ICD-10-CM

## 2020-11-06 LAB
POCT GLUCOSE: 151 MG/DL (ref 70–110)
POCT GLUCOSE: 160 MG/DL (ref 70–110)

## 2020-11-06 PROCEDURE — 11042 DBRDMT SUBQ TIS 1ST 20SQCM/<: CPT | Mod: ,,, | Performed by: PODIATRIST

## 2020-11-06 PROCEDURE — 99183 PR HYPERBARIC OXYGEN THERAPY ATTENDANCE/SUPERVISION, PER SESSION: ICD-10-PCS | Mod: ,,, | Performed by: FAMILY MEDICINE

## 2020-11-06 PROCEDURE — 99183 HYPERBARIC OXYGEN THERAPY: CPT | Mod: ,,, | Performed by: FAMILY MEDICINE

## 2020-11-06 PROCEDURE — 11042 WOUND DEBRIDEMENT: ICD-10-PCS | Mod: ,,, | Performed by: PODIATRIST

## 2020-11-06 PROCEDURE — 27201912 HC WOUND CARE DEBRIDEMENT SUPPLIES

## 2020-11-06 PROCEDURE — 11042 DBRDMT SUBQ TIS 1ST 20SQCM/<: CPT

## 2020-11-06 PROCEDURE — 27201912 HC WOUND CARE DEBRIDEMENT SUPPLIES: Performed by: PODIATRIST

## 2020-11-06 PROCEDURE — 82962 GLUCOSE BLOOD TEST: CPT | Performed by: FAMILY MEDICINE

## 2020-11-06 PROCEDURE — 99499 NO LOS: ICD-10-PCS | Mod: ,,, | Performed by: PODIATRIST

## 2020-11-06 PROCEDURE — 11042 DBRDMT SUBQ TIS 1ST 20SQCM/<: CPT | Performed by: PODIATRIST

## 2020-11-06 PROCEDURE — G0277 HBOT, FULL BODY CHAMBER, 30M: HCPCS | Performed by: FAMILY MEDICINE

## 2020-11-06 PROCEDURE — 99499 UNLISTED E&M SERVICE: CPT | Mod: ,,, | Performed by: PODIATRIST

## 2020-11-06 RX ORDER — OXYCODONE AND ACETAMINOPHEN 5; 325 MG/1; MG/1
1 TABLET ORAL EVERY 6 HOURS PRN
Qty: 28 TABLET | Refills: 0 | Status: SHIPPED | OUTPATIENT
Start: 2020-11-06 | End: 2021-01-06 | Stop reason: ALTCHOICE

## 2020-11-06 NOTE — PROGRESS NOTES
[facility]                Progress Note    Subjective:       Patient ID: Suyapa Connelly is a 55 y.o. female.    Chief Complaint: No chief complaint on file.    Patient ambulated to exam chair without assistance. No complaints of pain. Tenderness to Left Heel Diabetic Ulcer when cleaning only. Patient also reports increased pain at night that makes it difficult for her to sleep and is asking for more PO pain medication today. Continuing HBOT without issue. . Periwound macerated from drainage without strike-through to bandage. Patient has been having 1 x a week nurse visits along with Dr visit to manage drainage. Will continue with current therapy, but plan on applying wound vac during next visit if approved. Patient agreeable to plan of care.       Review of Systems   Constitutional: Negative.    Respiratory: Negative.    Gastrointestinal: Negative.    Genitourinary: Negative.    Musculoskeletal: Positive for arthralgias.   Skin: Positive for wound.   Neurological: Negative.    Psychiatric/Behavioral: Negative.          Objective:        Physical Exam  Constitutional:       Appearance: She is well-developed.      Comments: Oriented to time, place, and person.   Cardiovascular:      Comments: DP and PT pulses are palpable bilaterally. 3 sec capillary refill time and toes and feet are warm to touch proximally .  There is  hair growth on the feet and toes b/l. There is no edema b/l. No spider veins or varicosities present b/l.     Musculoskeletal:      Comments: Equinus noted b/l ankles with < 10 deg DF noted. MMT 5/5 in DF/PF/Inv/Ev resistance with no reproduction of pain in any direction. Passive range of motion of ankle and pedal joints is painless b/l.     Feet:      Right foot:      Skin integrity: No callus or dry skin.      Left foot:      Skin integrity: No callus or dry skin.   Lymphadenopathy:      Comments: Negative lymphadenopathy bilateral popliteal fossa  and tarsal tunnel.   Skin:     Comments: See wound description      Neurological:      Mental Status: She is alert.      Comments: Light touch, proprioception, and sharp/dull sensation are all intact bilaterally. Protective threshold with the Wrenshall-Wienstein monofilament is intact bilaterally.    Psychiatric:         Behavior: Behavior is cooperative.         There were no vitals filed for this visit.    Assessment:           ICD-10-CM ICD-9-CM   1. Diabetic ulcer of left heel associated with type 2 diabetes mellitus, with bone involvement without evidence of necrosis  E11.621 250.80    L97.426 707.14            Incision/Site 09/08/20 1316 Left Heel (Active)   09/08/20 1316   Present Prior to Hospital Arrival?:    Side: Left   Location: Heel   Orientation:    Incision Type:    Closure Method:    Additional Comments:    Removal Indication and Assessment:    Wound Outcome:    Removal Indications:    Incision WDL ex 11/06/20 1300   Dressing Appearance Moist drainage 11/06/20 1300   Drainage Amount Moderate 11/06/20 1300   Drainage Characteristics/Odor Serosanguineous 11/06/20 1300   Appearance Red;Granulating 11/06/20 1300   Red (%), Wound Tissue Color 100 % 11/06/20 1300   Periwound Area Macerated;Moist;Pale white 11/06/20 1300   Wound Edges Open 11/06/20 1300   Wound Length (cm) 1.5 cm 11/06/20 1300   Wound Width (cm) 3.4 cm 11/06/20 1300   Wound Depth (cm) 0.4 cm 11/06/20 1300   Wound Volume (cm^3) 2.04 cm^3 11/06/20 1300   Wound Surface Area (cm^2) 5.1 cm^2 11/06/20 1300   Care Cleansed with:;Soap and water;Sterile normal saline 11/06/20 1300   Dressing Changed 11/06/20 1300   Off Loading Football dressing 11/06/20 1300           Plan:            Debridement: see procedure note     Dressings: per above   Offloading:Foam    Follow-up:Patient is to return to the clinic in 1 week  for follow-up but should call [facility] immediately if any signs of infection, such as fever, chills, sweats, increased redness or  pain.    Short-term goals include maintaining good offloading and minimizing bioburden, promoting granulation and epithelialization to healing.  Long-term goals include keeping the wound healed by good offloading and medical management under the direction of internist.        Orders Placed This Encounter   Procedures    Change dressing     Clean with saline. Apply gentian violet periwound. Cover with santyl/hydrofera blue transfer/mextra, cast padding x 3/stockinet. Nurse visit on Tuesday to change. Plan for wound vac application after approval.        Follow up in about 4 days (around 11/10/2020) for nurse visit.

## 2020-11-06 NOTE — PROGRESS NOTES
11/06/20 1050   Hyperbaric Pre-Inspection   Mattress cleaned prior to treatment Yes   2 Patient Identifiers Verified Yes   Consent Obtained Yes   Cotton Gown Yes   Patient voided Yes   Drainage Bags Emptied Not applicable   Patient Pregnant No   Dentures, Hearing Aid, Prosthetic Devices Removed Yes   Touch hair to check for hair spray Yes   Cold/Flu Symptoms No   Diabetic Patient Eaten Yes   Medications Given Not applicable   Fears and apprehensions verbalized Yes   Ground Wire Secured Yes   HBO Treatment Course Details   Treatment Course Number 1   Ordering Provider Chitra   Indications Diabetic wounds of lower extremities   HBO Treatment Start Date 10/06/20   HBO Treatment Details   Treatment Number 9   Inpatient Visit No   Total Treatment Length Calc (minutes) 90   Chamber Type Monoplace   Chamber # 2   HBO Dive Log # 555   Treatment Protocol 2.0 ANAND x 90 minutes w/100% oxygen and no air break   Treatment Details   Dive Rate Down 2.0 psi/minute   Dive Rate Up 2.0 psi/minute   Compress Begins 1 ANAND   Clock Time 1053   Tx Pressure Reached 2 ANAND   Clock Time 1101   Decompress Begins 2 ANAND   Clock Time 1215   Decompress Ends 1 ANAND   Clock Time 1223   Pre HBO Vital Signs   /65   Pulse 87   Resp 20   Temp 97.7 °F (36.5 °C)   Blood Glucose 160   Glucose Meter # wc   Pain Level 0   Post HBO Vital Signs   BP (!) 188/79   Pulse 77   Resp 18   Blood Glucose 151   Glucose Meter # wc   Pain Level 0       Arrived awake and alert. ABC's intact. Vitals as above. Cleared for treatment by Cathy KAY Tolerated treatment without complaints. Cleared from chamber without incidents. Follow up monday for HBO.       ICD-10-CM ICD-9-CM   1. 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 Acute osteomyelitis of left calcaneus  M86.172 730.07        Physician Supervision  I provided direct supervision and was  immediately available to furnish assistance and direction throughout the performance of the procedure.    Emergency Response Team  A trained emergency response team and emergency services were available throughout procedure.

## 2020-11-07 NOTE — PROCEDURES
Wound Debridement    Date/Time: 11/6/2020 3:00 PM  Performed by: Rosio Mayes DPM  Authorized by: Rosio Mayes DPM     Consent Done?:  Yes (Written)    Wound Details:    Location:  Left foot    Location:  Left Heel    Type of Debridement:  Excisional       Length (cm):  1.5       Area (sq cm):  5.1       Width (cm):  3.4       Percent Debrided (%):  100       Depth (cm):  0.4       Total Area Debrided (sq cm):  5.1    Depth of debridement:  Subcutaneous tissue    Tissue debrided:  Dermis    Devitalized tissue debrided:  Biofilm, Callus and Fibrin    Instruments:  Curette    Bleeding:  Minimal  Hemostasis Achieved: Yes    Method Used:  Pressure  Patient tolerance:  Patient tolerated the procedure well with no immediate complications

## 2020-11-07 NOTE — PROCEDURES
Wound Debridement    Date/Time: 10/23/2020 2:00 PM  Performed by: Rosio Mayes DPM  Authorized by: Rosio Mayes DPM     Consent Done?:  Yes (Written)    Wound Details:    Location:  Left foot    Location:  Left Heel    Type of Debridement:  Excisional       Length (cm):  2       Area (sq cm):  7.6       Width (cm):  3.8       Percent Debrided (%):  100       Depth (cm):  0.3       Total Area Debrided (sq cm):  7.6    Depth of debridement:  Subcutaneous tissue    Tissue debrided:  Dermis    Devitalized tissue debrided:  Biofilm, Callus, Fibrin and Exudate    Instruments:  Curette    Bleeding:  Minimal  Hemostasis Achieved: Yes    Method Used:  Pressure  Patient tolerance:  Patient tolerated the procedure well with no immediate complications

## 2020-11-11 ENCOUNTER — HOSPITAL ENCOUNTER (OUTPATIENT)
Dept: WOUND CARE | Facility: HOSPITAL | Age: 55
Discharge: HOME | End: 2020-11-11
Attending: FAMILY MEDICINE
Payer: MEDICAID

## 2020-11-11 DIAGNOSIS — M86.172 ACUTE OSTEOMYELITIS OF LEFT CALCANEUS: ICD-10-CM

## 2020-11-11 DIAGNOSIS — L97.426 DIABETIC ULCER OF LEFT HEEL ASSOCIATED WITH TYPE 2 DIABETES MELLITUS, WITH BONE INVOLVEMENT WITHOUT EVIDENCE OF NECROSIS: Primary | ICD-10-CM

## 2020-11-11 DIAGNOSIS — E11.621 DIABETIC ULCER OF LEFT HEEL ASSOCIATED WITH TYPE 2 DIABETES MELLITUS, WITH BONE INVOLVEMENT WITHOUT EVIDENCE OF NECROSIS: Primary | ICD-10-CM

## 2020-11-11 LAB
POCT GLUCOSE: 166 MG/DL (ref 70–110)
POCT GLUCOSE: 224 MG/DL (ref 70–110)

## 2020-11-11 PROCEDURE — G0277 HBOT, FULL BODY CHAMBER, 30M: HCPCS | Performed by: FAMILY MEDICINE

## 2020-11-11 PROCEDURE — 99183 HYPERBARIC OXYGEN THERAPY: CPT | Mod: ,,, | Performed by: FAMILY MEDICINE

## 2020-11-11 PROCEDURE — 99183 PR HYPERBARIC OXYGEN THERAPY ATTENDANCE/SUPERVISION, PER SESSION: ICD-10-PCS | Mod: ,,, | Performed by: FAMILY MEDICINE

## 2020-11-11 PROCEDURE — 82962 GLUCOSE BLOOD TEST: CPT | Performed by: FAMILY MEDICINE

## 2020-11-11 NOTE — PROGRESS NOTES
11/11/20 1400   Hyperbaric Pre-Inspection   Mattress cleaned prior to treatment Yes   2 Patient Identifiers Verified Yes   Consent Obtained Yes   Cotton Gown Yes   Patient voided Yes   Drainage Bags Emptied Not applicable   Patient Pregnant No   Glasses, Jewelry, Makeup, etc. Removed Yes   Dentures, Hearing Aid, Prosthetic Devices Removed Yes   Touch hair to check for hair spray Yes   Cold/Flu Symptoms No   Diabetic Patient Eaten Yes   Medications Given Not applicable   Fears and apprehensions verbalized Yes   Ground Wire Secured Yes   HBO Treatment Course Details   Treatment Course Number 1   Ordering Provider Suman KAY   Indications Diabetic wounds of lower extremities   HBO Treatment Start Date 10/06/20   HBO Treatment Details   Treatment Number 10   Inpatient Visit No   Total Treatment Length Calc (minutes) 106   Chamber Type Monoplace   Chamber # 2   HBO Dive Log # 556   Treatment Protocol 2.0 ANAND x 90 minutes w/100% oxygen and no air break   Treatment Details   Dive Rate Down 2.0 psi/minute   Dive Rate Up 2.0 psi/minute   Compress Begins 1 ANAND   Clock Time 1358   Tx Pressure Reached 2 ANAND   Clock Time 1406   Decompress Begins 2 ANAND   Clock Time 1536   Decompress Ends 1 ANAND   Clock Time 1544   Pre HBO Vital Signs   BP (!) 151/66   Pulse 83   Resp 20   Temp 97.3 °F (36.3 °C)   Blood Glucose 224   Glucose Meter # wc   Pain Level 0   Post HBO Vital Signs   BP (!) 182/81   Pulse 84   Resp 18   Blood Glucose 166   Glucose Meter # wc   Pain Level 0   Ear Evaluation   Left Teed Scale Pre Grade 0   Right Teed Scale Pre Grade 0       Arrived awake and alert. ABC's intact. Cleared for treatment by Suman KAY Tolerated treatment without complaints. Cleared from chamber without incidents. Follow up tomorrow for HBO.       ICD-10-CM ICD-9-CM   1. 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 Acute  osteomyelitis of left calcaneus  M86.172 730.07        Physician Supervision  I provided direct supervision and was immediately available to furnish assistance and direction throughout the performance of the procedure.    Emergency Response Team  A trained emergency response team and emergency services were available throughout procedure.

## 2020-11-12 ENCOUNTER — HOSPITAL ENCOUNTER (OUTPATIENT)
Dept: WOUND CARE | Facility: HOSPITAL | Age: 55
Discharge: HOME | End: 2020-11-12
Attending: INTERNAL MEDICINE
Payer: MEDICAID

## 2020-11-12 DIAGNOSIS — E11.621 DIABETIC ULCER OF LEFT HEEL ASSOCIATED WITH TYPE 2 DIABETES MELLITUS, WITH BONE INVOLVEMENT WITHOUT EVIDENCE OF NECROSIS: Primary | ICD-10-CM

## 2020-11-12 DIAGNOSIS — M86.172 ACUTE OSTEOMYELITIS OF LEFT CALCANEUS: ICD-10-CM

## 2020-11-12 DIAGNOSIS — L97.426 DIABETIC ULCER OF LEFT HEEL ASSOCIATED WITH TYPE 2 DIABETES MELLITUS, WITH BONE INVOLVEMENT WITHOUT EVIDENCE OF NECROSIS: Primary | ICD-10-CM

## 2020-11-12 LAB
POCT GLUCOSE: 151 MG/DL (ref 70–110)
POCT GLUCOSE: 157 MG/DL (ref 70–110)

## 2020-11-12 PROCEDURE — 82962 GLUCOSE BLOOD TEST: CPT | Performed by: INTERNAL MEDICINE

## 2020-11-12 PROCEDURE — 99183 PR HYPERBARIC OXYGEN THERAPY ATTENDANCE/SUPERVISION, PER SESSION: ICD-10-PCS | Mod: ,,, | Performed by: INTERNAL MEDICINE

## 2020-11-12 PROCEDURE — 99183 HYPERBARIC OXYGEN THERAPY: CPT | Mod: ,,, | Performed by: INTERNAL MEDICINE

## 2020-11-12 PROCEDURE — G0277 HBOT, FULL BODY CHAMBER, 30M: HCPCS | Performed by: INTERNAL MEDICINE

## 2020-11-12 NOTE — PROGRESS NOTES
11/12/20 1545   Hyperbaric Pre-Inspection   Mattress cleaned prior to treatment Yes   2 Patient Identifiers Verified Yes   Consent Obtained Yes   Cotton Gown Yes   Patient voided Yes   Drainage Bags Emptied Not applicable   Patient Pregnant No   Glasses, Jewelry, Makeup, etc. Removed Yes   Dentures, Hearing Aid, Prosthetic Devices Removed Yes   Touch hair to check for hair spray Yes   Cold/Flu Symptoms No   Diabetic Patient Eaten Yes   Medications Given Not applicable   Fears and apprehensions verbalized Yes   Ground Wire Secured Yes   HBO Treatment Course Details   Treatment Course Number 1   Ordering Provider Suman KAY   Indications Diabetic wounds of lower extremities   HBO Treatment Details   Treatment Number [id number]   Inpatient Visit No   Total Treatment Length Calc (minutes) 106   Chamber Type Monoplace   Chamber # 2   HBO Dive Log # 557   Treatment Protocol 2.0 ANAND x 90 minutes w/100% oxygen and no air break   Treatment Details   Dive Rate Down 2.0 psi/minute   Dive Rate Up 2.0 psi/minute   Compress Begins 1 ANAND   Clock Time 1328   Tx Pressure Reached 2 ANAND   Clock Time 1336   Decompress Begins 2 ANAND   Clock Time 1506   Decompress Ends 1 ANAND   Clock Time 1514   Pre HBO Vital Signs   BP (!) 142/67   Pulse 84   Resp 18   Temp 97.5 °F (36.4 °C)   Blood Glucose 157   Glucose Meter # wc   Pain Level 0   Post HBO Vital Signs   BP (!) 193/91   Pulse 80   Resp 18   Blood Glucose 151   Glucose Meter # wc   Pain Level 0   Ear Evaluation   Left Teed Scale Pre Grade 0   Left Teed Scale Post Grade 0   Right Teed Scale Pre Grade 0   Right Teed Scale Post Grade 0       Arrived awake and alert. ABC's intact. Cleared for treatment by Diamond KAY Tolerated treatment without complaints. Cleared from chamber without incidents. Follow up Tomorrow for HBO.         ICD-10-CM ICD-9-CM   1. Diabetic ulcer of left heel associated with type 2 diabetes mellitus, with bone involvement without evidence of necrosis  E11.621 250.80     L97.426 707.14   2. Acute osteomyelitis of left calcaneus  M86.172 730.07        Physician Supervision  I provided direct supervision and was immediately available to furnish assistance and direction throughout the performance of the procedure.    Emergency Response Team  A trained emergency response team and emergency services were available throughout procedure.

## 2020-11-13 ENCOUNTER — HOSPITAL ENCOUNTER (OUTPATIENT)
Dept: WOUND CARE | Facility: HOSPITAL | Age: 55
Discharge: HOME | End: 2020-11-13
Attending: PODIATRIST
Payer: MEDICAID

## 2020-11-13 ENCOUNTER — HOSPITAL ENCOUNTER (OUTPATIENT)
Dept: WOUND CARE | Facility: HOSPITAL | Age: 55
Discharge: HOME | End: 2020-11-13
Attending: FAMILY MEDICINE
Payer: MEDICAID

## 2020-11-13 VITALS
HEART RATE: 80 BPM | SYSTOLIC BLOOD PRESSURE: 140 MMHG | TEMPERATURE: 97 F | RESPIRATION RATE: 18 BRPM | DIASTOLIC BLOOD PRESSURE: 66 MMHG

## 2020-11-13 DIAGNOSIS — E11.621 DIABETIC ULCER OF LEFT HEEL ASSOCIATED WITH TYPE 2 DIABETES MELLITUS, WITH BONE INVOLVEMENT WITHOUT EVIDENCE OF NECROSIS: Primary | ICD-10-CM

## 2020-11-13 DIAGNOSIS — M86.172 ACUTE OSTEOMYELITIS OF LEFT CALCANEUS: ICD-10-CM

## 2020-11-13 DIAGNOSIS — L97.426 DIABETIC ULCER OF LEFT HEEL ASSOCIATED WITH TYPE 2 DIABETES MELLITUS, WITH BONE INVOLVEMENT WITHOUT EVIDENCE OF NECROSIS: Primary | ICD-10-CM

## 2020-11-13 LAB
POCT GLUCOSE: 132 MG/DL (ref 70–110)
POCT GLUCOSE: 146 MG/DL (ref 70–110)

## 2020-11-13 PROCEDURE — 97605 NEG PRS WND THER DME<=50SQCM: CPT | Performed by: PODIATRIST

## 2020-11-13 PROCEDURE — 99183 HYPERBARIC OXYGEN THERAPY: CPT | Mod: ,,, | Performed by: FAMILY MEDICINE

## 2020-11-13 PROCEDURE — 82962 GLUCOSE BLOOD TEST: CPT | Performed by: FAMILY MEDICINE

## 2020-11-13 PROCEDURE — G0277 HBOT, FULL BODY CHAMBER, 30M: HCPCS | Performed by: FAMILY MEDICINE

## 2020-11-13 PROCEDURE — 99499 UNLISTED E&M SERVICE: CPT | Mod: ,,, | Performed by: PODIATRIST

## 2020-11-13 PROCEDURE — 99499 NO LOS: ICD-10-PCS | Mod: ,,, | Performed by: PODIATRIST

## 2020-11-13 PROCEDURE — 11042 DBRDMT SUBQ TIS 1ST 20SQCM/<: CPT | Mod: ,,, | Performed by: PODIATRIST

## 2020-11-13 PROCEDURE — 11042 WOUND DEBRIDEMENT: ICD-10-PCS | Mod: ,,, | Performed by: PODIATRIST

## 2020-11-13 PROCEDURE — 27201912 HC WOUND CARE DEBRIDEMENT SUPPLIES: Performed by: PODIATRIST

## 2020-11-13 PROCEDURE — 11042 DBRDMT SUBQ TIS 1ST 20SQCM/<: CPT | Performed by: PODIATRIST

## 2020-11-13 PROCEDURE — 99183 PR HYPERBARIC OXYGEN THERAPY ATTENDANCE/SUPERVISION, PER SESSION: ICD-10-PCS | Mod: ,,, | Performed by: FAMILY MEDICINE

## 2020-11-13 NOTE — PROGRESS NOTES
[facility]                Progress Note    Subjective:       Patient ID: Suyapa Connelly is a 55 y.o. female.    Chief Complaint: Non-healing Wound Follow Up    11/13/2020: F/U visit. No fever. No pain. Wound is improving and measuring smaller. Red grandular tissue. Macerated/callused periwound. Applied wound vac (black:1). Suction at 75mmHG. Revaluate on Tuesday during nurse visit for adjustments. Discussed elevating/ offloading as much as possible. Pt understood teachings about the wound vac.      Review of Systems   Constitutional: Negative.    Respiratory: Negative.    Cardiovascular: Negative.    Gastrointestinal: Negative.    Genitourinary: Negative.    Musculoskeletal: Negative.    Skin: Positive for wound.   Neurological: Negative.          Objective:        Physical Exam  Constitutional:       Appearance: She is well-developed.   Cardiovascular:      Comments: Dorsalis pedis and posterior tibial pulses are diminished Bilaterally. Toes are cool to touch. Feet are warm proximally.There is decreased digital hair. Skin is atrophic, slightly hyperpigmented, and mildly edematous   Pulmonary:      Effort: No respiratory distress.   Musculoskeletal:         General: Tenderness present.      Comments: Adequate joint range of motion without pain, limitation, nor crepitation Bilateral feet and ankle joints. Muscle strength is 5/5 in all groups bilaterally.    Feet:      Right foot:      Skin integrity: Callus and dry skin present. No ulcer or skin breakdown.      Left foot:      Skin integrity: Dry skin present. No ulcer.   Skin:     General: Skin is dry.      Findings: No erythema.      Comments: See wound description      Neurological:      Mental Status: She is alert.      Comments: Sumner-Chato 5.07 monofilamant testing is diminished Tyrell feet. Sharp/dull sensation diminished Bilaterally. Light touch absent Bilaterally.          Vitals:    11/13/20 1309   BP: (!)  140/66   Pulse: 80   Resp: 18   Temp: 97.2 °F (36.2 °C)       Assessment:           ICD-10-CM ICD-9-CM   1. Diabetic ulcer of left heel associated with type 2 diabetes mellitus, with bone involvement without evidence of necrosis  E11.621 250.80    L97.426 707.14            Incision/Site 09/08/20 1316 Left Heel (Active)   09/08/20 1316   Present Prior to Hospital Arrival?:    Side: Left   Location: Heel   Orientation:    Incision Type:    Closure Method:    Additional Comments:    Removal Indication and Assessment:    Wound Outcome:    Removal Indications:    Wound Image   11/13/20 1300   Incision WDL ex 11/13/20 1300   Dressing Appearance Intact;Moist drainage 11/13/20 1300   Drainage Amount Large 11/13/20 1300   Drainage Characteristics/Odor Serous;Yellow 11/13/20 1300   Appearance Red;Pink;White;Slough 11/13/20 1300   Black (%), Wound Tissue Color 0 % 11/13/20 1300   Red (%), Wound Tissue Color 95 % 11/13/20 1300   Yellow (%), Wound Tissue Color 5 % 11/13/20 1300   Periwound Area Macerated 11/13/20 1300   Wound Edges Open;Callused 11/13/20 1300   Wound Length (cm) 1.2 cm 11/13/20 1300   Wound Width (cm) 3 cm 11/13/20 1300   Wound Depth (cm) 0.5 cm 11/13/20 1300   Wound Volume (cm^3) 1.8 cm^3 11/13/20 1300   Wound Surface Area (cm^2) 3.6 cm^2 11/13/20 1300   Tunneling (depth (cm)/location) 0 11/13/20 1300   Undermining (depth (cm)/location) 0 11/13/20 1300   Care Soap and water;Sterile normal saline;Wound cleanser 11/13/20 1300   Dressing Applied;Other (see comments);Foam 11/13/20 1300   Periwound Care Other (see comments) 11/13/20 1300   Off Loading Football dressing 11/13/20 1300   Dressing Change Due 11/17/20 11/13/20 1300           Plan:            Debridement: see procedure note     Dressings:per above  Offloading:Foam    Follow-up:Patient is to return to the clinic in 1 week  for follow-up but [facility] if any signs of infection, such as fever, chills, sweats, increased redness or  pain.    Short-term goals include maintaining good offloading and minimizing bioburden, promoting granulation and epithelialization to healing.  Long-term goals include keeping the wound healed by good offloading and medical management under the direction of internist.          Orders Placed This Encounter   Procedures    Change dressing     Gentian violet periwound. Apply benzoin/ cavilon, wound vac (black:1), aquacel foam border to anterior ankle,mextra short X1, scott foam long X2, football dressing (cast padding X3, stockinet).        No follow-ups on file.

## 2020-11-13 NOTE — PROGRESS NOTES
11/13/20 1000   Hyperbaric Pre-Inspection   Mattress cleaned prior to treatment Yes   2 Patient Identifiers Verified Yes   Consent Obtained Yes   Cotton Gown Yes   Patient voided Yes   Drainage Bags Emptied Not applicable   Patient Pregnant No   Glasses, Jewelry, Makeup, etc. Removed Yes   Dentures, Hearing Aid, Prosthetic Devices Removed Yes   Touch hair to check for hair spray Yes   Cold/Flu Symptoms No   Diabetic Patient Eaten Yes   Medications Given Not applicable   Fears and apprehensions verbalized Yes   Ground Wire Secured Yes   HBO Treatment Course Details   Treatment Course Number 1   Ordering Provider Suman KAY   Indications Diabetic wounds of lower extremities   HBO Treatment Start Date 10/06/20   HBO Treatment Details   Treatment Number 12   Inpatient Visit No   Total Treatment Length Calc (minutes) 106   Chamber Type Monoplace   Chamber # 2   HBO Dive Log # 558   Treatment Protocol 2.0 ANAND x 90 minutes w/100% oxygen and no air break   Treatment Details   Dive Rate Down 2.0 psi/minute   Dive Rate Up 2.0 psi/minute   Compress Begins 1 ANAND   Clock Time 1027   Tx Pressure Reached 2 ANAND   Clock Time 1035   Decompress Begins 2 ANAND   Clock Time 1205   Decompress Ends 1 ANAND   Clock Time 1213   Pre HBO Vital Signs   BP (!) 144/65   Pulse 73   Resp 18   Temp 97.7 °F (36.5 °C)   Blood Glucose 132   Glucose Meter # wc   Pain Level 0   Post HBO Vital Signs   BP (!) 178/78   Pulse 70   Resp 20   Blood Glucose 146   Glucose Meter # wc   Pain Level 0       Arrived awake and alert. ABC's intact. Cleared for treatment by Fara KAY Tolerated treatment without complaints. Cleared from chamber without incidents. Follow up Monday for HBO.       ICD-10-CM ICD-9-CM   1. 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 Acute osteomyelitis of left calcaneus  M86.172 730.07        Physician Supervision  I  provided direct supervision and was immediately available to furnish assistance and direction throughout the performance of the procedure.    Emergency Response Team  A trained emergency response team and emergency services were available throughout procedure.

## 2020-11-15 NOTE — PROCEDURES
Wound Debridement    Date/Time: 11/13/2020 1:00 PM  Performed by: oRsio Mayes DPM  Authorized by: Rosio Mayes DPM     Consent Done?:  Yes (Written)    Wound Details:    Location:  Left foot    Location:  Left Heel    Type of Debridement:  Excisional       Length (cm):  1.2       Area (sq cm):  3.6       Width (cm):  3       Percent Debrided (%):  100       Depth (cm):  0.5       Total Area Debrided (sq cm):  3.6    Depth of debridement:  Subcutaneous tissue    Tissue debrided:  Dermis    Devitalized tissue debrided:  Biofilm, Exudate and Fibrin    Instruments:  Curette    Bleeding:  Minimal  Method Used:  Pressure  Patient tolerance:  Patient tolerated the procedure well with no immediate complications

## 2020-11-16 ENCOUNTER — HOSPITAL ENCOUNTER (OUTPATIENT)
Dept: WOUND CARE | Facility: HOSPITAL | Age: 55
Discharge: HOME | End: 2020-11-16
Attending: FAMILY MEDICINE
Payer: MEDICAID

## 2020-11-16 DIAGNOSIS — E11.621 DIABETIC ULCER OF LEFT HEEL ASSOCIATED WITH TYPE 2 DIABETES MELLITUS, WITH BONE INVOLVEMENT WITHOUT EVIDENCE OF NECROSIS: Primary | ICD-10-CM

## 2020-11-16 DIAGNOSIS — M86.172 ACUTE OSTEOMYELITIS OF LEFT CALCANEUS: ICD-10-CM

## 2020-11-16 DIAGNOSIS — L97.426 DIABETIC ULCER OF LEFT HEEL ASSOCIATED WITH TYPE 2 DIABETES MELLITUS, WITH BONE INVOLVEMENT WITHOUT EVIDENCE OF NECROSIS: Primary | ICD-10-CM

## 2020-11-16 LAB
POCT GLUCOSE: 227 MG/DL (ref 70–110)
POCT GLUCOSE: 287 MG/DL (ref 70–110)

## 2020-11-16 PROCEDURE — G0277 HBOT, FULL BODY CHAMBER, 30M: HCPCS | Performed by: FAMILY MEDICINE

## 2020-11-16 PROCEDURE — 82962 GLUCOSE BLOOD TEST: CPT | Performed by: FAMILY MEDICINE

## 2020-11-16 PROCEDURE — 99183 PR HYPERBARIC OXYGEN THERAPY ATTENDANCE/SUPERVISION, PER SESSION: ICD-10-PCS | Mod: ,,, | Performed by: FAMILY MEDICINE

## 2020-11-16 PROCEDURE — 99183 HYPERBARIC OXYGEN THERAPY: CPT | Mod: ,,, | Performed by: FAMILY MEDICINE

## 2020-11-16 NOTE — PROGRESS NOTES
11/16/20 1552   Hyperbaric Pre-Inspection   Mattress cleaned prior to treatment Yes   2 Patient Identifiers Verified Yes   Consent Obtained Yes   Cotton Gown Yes   Patient voided Yes   Drainage Bags Emptied Not applicable   Patient Pregnant No   Glasses, Jewelry, Makeup, etc. Removed Yes   Dentures, Hearing Aid, Prosthetic Devices Removed Yes   Touch hair to check for hair spray Yes   Cold/Flu Symptoms No   Diabetic Patient Eaten Yes   Medications Given Not applicable   Fears and apprehensions verbalized Yes   Ground Wire Secured Yes   HBO Treatment Course Details   Treatment Course Number 1   Ordering Provider Suman KAY   Indications Diabetic wounds of lower extremities   HBO Treatment Start Date 10/06/20   HBO Treatment Details   Treatment Number 13   Inpatient Visit No   Total Treatment Length Calc (minutes) 106   Chamber Type Monoplace   Chamber # 2   HBO Dive Log # 559   Treatment Protocol 2.0 ANAND x 90 minutes w/100% oxygen and no air break   Treatment Details   Dive Rate Down 2.0 psi/minute   Dive Rate Up 2.0 psi/minute   Compress Begins 1 ANAND   Clock Time 1343   Tx Pressure Reached 2 ANAND   Clock Time 1351   Decompress Begins 2 ANAND   Clock Time 1521   Decompress Ends 1 ANAND   Clock Time 1529   Pre HBO Vital Signs   BP (!) 161/70   Pulse 85   Resp 18   Temp 97.2 °F (36.2 °C)   Blood Glucose 287   Glucose Meter # wc   Pain Level 0   Post HBO Vital Signs   BP (!) 193/88   Pulse 84   Resp 20   Blood Glucose 227   Glucose Meter # wc   Pain Level 0       Arrived awake and alert. ABC's intact. Cleared for treatment by Fara KAY Tolerated treatment without complaints. Cleared from chamber without incidents. Follow up tomorrow for HBO.       ICD-10-CM ICD-9-CM   1. 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 Acute osteomyelitis of left calcaneus  M86.172 730.07        Physician Supervision  I  provided direct supervision and was immediately available to furnish assistance and direction throughout the performance of the procedure.    Emergency Response Team  A trained emergency response team and emergency services were available throughout procedure.

## 2020-11-17 ENCOUNTER — HOSPITAL ENCOUNTER (OUTPATIENT)
Dept: WOUND CARE | Facility: HOSPITAL | Age: 55
Discharge: HOME | End: 2020-11-17
Attending: FAMILY MEDICINE
Payer: MEDICAID

## 2020-11-17 VITALS
HEART RATE: 77 BPM | SYSTOLIC BLOOD PRESSURE: 183 MMHG | DIASTOLIC BLOOD PRESSURE: 80 MMHG | RESPIRATION RATE: 18 BRPM | TEMPERATURE: 98 F

## 2020-11-17 DIAGNOSIS — E11.621 DIABETIC ULCER OF LEFT HEEL ASSOCIATED WITH TYPE 2 DIABETES MELLITUS, WITH BONE INVOLVEMENT WITHOUT EVIDENCE OF NECROSIS: Primary | ICD-10-CM

## 2020-11-17 DIAGNOSIS — M86.172 ACUTE OSTEOMYELITIS OF LEFT CALCANEUS: ICD-10-CM

## 2020-11-17 DIAGNOSIS — L97.426 DIABETIC ULCER OF LEFT HEEL ASSOCIATED WITH TYPE 2 DIABETES MELLITUS, WITH BONE INVOLVEMENT WITHOUT EVIDENCE OF NECROSIS: Primary | ICD-10-CM

## 2020-11-17 LAB
POCT GLUCOSE: 166 MG/DL (ref 70–110)
POCT GLUCOSE: 193 MG/DL (ref 70–110)

## 2020-11-17 PROCEDURE — 99183 PR HYPERBARIC OXYGEN THERAPY ATTENDANCE/SUPERVISION, PER SESSION: ICD-10-PCS | Mod: ,,, | Performed by: FAMILY MEDICINE

## 2020-11-17 PROCEDURE — 97605 NEG PRS WND THER DME<=50SQCM: CPT

## 2020-11-17 PROCEDURE — G0277 HBOT, FULL BODY CHAMBER, 30M: HCPCS | Performed by: FAMILY MEDICINE

## 2020-11-17 PROCEDURE — 99183 HYPERBARIC OXYGEN THERAPY: CPT | Mod: ,,, | Performed by: FAMILY MEDICINE

## 2020-11-17 PROCEDURE — 82962 GLUCOSE BLOOD TEST: CPT | Performed by: FAMILY MEDICINE

## 2020-11-17 NOTE — PROGRESS NOTES
[facility]  JOSUÉ Lerner  [id number]  Nurse Visit    Subjective:       Patient seen in clinic today.  Dressing changed as ordered.      Assessment:          Negative Pressure Wound Therapy  11/13/20 Left (Active)   11/13/20    Side: Left   Orientation:    Location: Heel   Additional Comments:    Location:    SDO Location:    NPWT Type Vacuum Therapy 11/17/20 1600   Therapy Setting NPWT Continuous therapy 11/17/20 1600   Pressure Setting NPWT 100 mmHg 11/17/20 1600   Therapy Interventions NPWT Dressing changed;Seal intact 11/17/20 1600   Sponges Inserted NPWT Black;1 11/17/20 1600   Sponges Removed NPWT Black;1 11/17/20 1600   General Output (mL) 10 11/17/20 1600            Incision/Site 09/08/20 1316 Left Heel (Active)   09/08/20 1316   Present Prior to Hospital Arrival?:    Side: Left   Location: Heel   Orientation:    Incision Type:    Closure Method:    Additional Comments:    Removal Indication and Assessment:    Wound Outcome:    Removal Indications:    Incision WDL ex 11/17/20 1600   Dressing Appearance Intact;Moist drainage 11/17/20 1600   Drainage Amount Small 11/17/20 1600   Drainage Characteristics/Odor Serosanguineous 11/17/20 1600   Appearance Red;Pink;White;Yellow 11/17/20 1600   Black (%), Wound Tissue Color 0 % 11/17/20 1600   Red (%), Wound Tissue Color 90 % 11/17/20 1600   Yellow (%), Wound Tissue Color 10 % 11/17/20 1600   Periwound Area Macerated 11/17/20 1600   Wound Edges Defined 11/17/20 1600   Tunneling (depth (cm)/location) 0 11/17/20 1600   Undermining (depth (cm)/location) 0 11/17/20 1600   Care Soap and water;Sterile normal saline;Wound cleanser 11/17/20 1600   Dressing Applied;Other (see comments);Foam 11/17/20 1600   Periwound Care Other (see comments) 11/17/20 1600   Compression Tubular elasticized bandage 11/17/20 1600   Off Loading Football dressing 11/17/20 1600   Dressing Change Due 11/20/20 11/17/20 1600           Plan:     No orders of the  defined types were placed in this encounter.          No follow-ups on file.

## 2020-11-17 NOTE — PROGRESS NOTES
11/17/20 1300   Hyperbaric Pre-Inspection   Mattress cleaned prior to treatment Yes   2 Patient Identifiers Verified Yes   Consent Obtained Yes   Cotton Gown Yes   Patient voided Yes   Drainage Bags Emptied Not applicable   Patient Pregnant No   Glasses, Jewelry, Makeup, etc. Removed Yes   Dentures, Hearing Aid, Prosthetic Devices Removed Yes   Touch hair to check for hair spray Yes   Cold/Flu Symptoms No   Diabetic Patient Eaten Yes   Medications Given Not applicable   Fears and apprehensions verbalized Yes   Ground Wire Secured Yes   HBO Treatment Course Details   Treatment Course Number 1   Ordering Provider Suman KAY   Indications Diabetic wounds of lower extremities   HBO Treatment Start Date 10/06/20   HBO Treatment Details   Treatment Number 14   Inpatient Visit No   Total Treatment Length Calc (minutes) 106   Chamber Type Monoplace   Chamber # 2   HBO Dive Log # 560   Treatment Protocol 2.0 ANAND x 90 minutes w/100% oxygen and no air break   Treatment Details   Dive Rate Down 2.0 psi/minute   Dive Rate Up 2.0 psi/minute   Compress Begins 1 ANAND   Clock Time 1338   Tx Pressure Reached 2 ANAND   Clock Time 1346   Decompress Begins 2 ANAND   Clock Time 1516   Decompress Ends 1 ANAND   Clock Time 1524   Pre HBO Vital Signs   BP (!) 141/66   Pulse 85   Resp 18   Temp 97.5 °F (36.4 °C)   Blood Glucose 193   Glucose Meter # wc   Pain Level 0   Post HBO Vital Signs   BP (!) 183/80   Pulse 77   Resp 18   Blood Glucose 166   Glucose Meter # wc   Pain Level 0       Arrived awake and alert. ABC's intact. Cleared for treatment by Catherine KAY Tolerated treatment without complaints. Cleared from chamber without incidents. Follow up tomorrow for HBO.       ICD-10-CM ICD-9-CM   1. 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 Acute osteomyelitis of left calcaneus  M86.172 730.07        Physician Supervision  I  provided direct supervision and was immediately available to furnish assistance and direction throughout the performance of the procedure.    Emergency Response Team  A trained emergency response team and emergency services were available throughout procedure.

## 2020-11-20 ENCOUNTER — HOSPITAL ENCOUNTER (OUTPATIENT)
Dept: WOUND CARE | Facility: HOSPITAL | Age: 55
Discharge: HOME | End: 2020-11-20
Attending: PODIATRIST
Payer: MEDICAID

## 2020-11-20 ENCOUNTER — HOSPITAL ENCOUNTER (OUTPATIENT)
Dept: WOUND CARE | Facility: HOSPITAL | Age: 55
Discharge: HOME | End: 2020-11-20
Attending: FAMILY MEDICINE
Payer: MEDICAID

## 2020-11-20 VITALS
DIASTOLIC BLOOD PRESSURE: 93 MMHG | TEMPERATURE: 98 F | HEART RATE: 94 BPM | RESPIRATION RATE: 20 BRPM | SYSTOLIC BLOOD PRESSURE: 191 MMHG

## 2020-11-20 DIAGNOSIS — E11.621 DIABETIC ULCER OF LEFT HEEL ASSOCIATED WITH TYPE 2 DIABETES MELLITUS, WITH BONE INVOLVEMENT WITHOUT EVIDENCE OF NECROSIS: Primary | ICD-10-CM

## 2020-11-20 DIAGNOSIS — L97.426 DIABETIC ULCER OF LEFT HEEL ASSOCIATED WITH TYPE 2 DIABETES MELLITUS, WITH BONE INVOLVEMENT WITHOUT EVIDENCE OF NECROSIS: Primary | ICD-10-CM

## 2020-11-20 DIAGNOSIS — M86.172 ACUTE OSTEOMYELITIS OF LEFT CALCANEUS: ICD-10-CM

## 2020-11-20 LAB
POCT GLUCOSE: 142 MG/DL (ref 70–110)
POCT GLUCOSE: 190 MG/DL (ref 70–110)

## 2020-11-20 PROCEDURE — 97605 NEG PRS WND THER DME<=50SQCM: CPT | Performed by: PODIATRIST

## 2020-11-20 PROCEDURE — 11042 DBRDMT SUBQ TIS 1ST 20SQCM/<: CPT | Performed by: PODIATRIST

## 2020-11-20 PROCEDURE — 27201912 HC WOUND CARE DEBRIDEMENT SUPPLIES: Performed by: PODIATRIST

## 2020-11-20 PROCEDURE — 11042 WOUND DEBRIDEMENT: ICD-10-PCS | Mod: ,,, | Performed by: PODIATRIST

## 2020-11-20 PROCEDURE — G0277 HBOT, FULL BODY CHAMBER, 30M: HCPCS | Performed by: FAMILY MEDICINE

## 2020-11-20 PROCEDURE — 11042 DBRDMT SUBQ TIS 1ST 20SQCM/<: CPT | Mod: ,,, | Performed by: PODIATRIST

## 2020-11-20 PROCEDURE — 82962 GLUCOSE BLOOD TEST: CPT | Performed by: FAMILY MEDICINE

## 2020-11-20 PROCEDURE — 99499 UNLISTED E&M SERVICE: CPT | Mod: ,,, | Performed by: PODIATRIST

## 2020-11-20 PROCEDURE — 99499 NO LOS: ICD-10-PCS | Mod: ,,, | Performed by: PODIATRIST

## 2020-11-20 NOTE — PROGRESS NOTES
11/20/20 1218   Hyperbaric Pre-Inspection   Mattress cleaned prior to treatment Yes   2 Patient Identifiers Verified Yes   Consent Obtained Yes   Cotton Gown Yes   Patient voided Yes   Drainage Bags Emptied Not applicable   Patient Pregnant No   Glasses, Jewelry, Makeup, etc. Removed Yes   Hard contacts removed Yes   Dentures, Hearing Aid, Prosthetic Devices Removed Yes   Touch hair to check for hair spray Yes   Cold/Flu Symptoms No   Diabetic Patient Eaten Yes   Medications Given Not applicable   Fears and apprehensions verbalized Yes   Ground Wire Secured Yes   HBO Treatment Course Details   Treatment Course Number 1   Ordering Provider Suman KAY   Indications Diabetic wounds of lower extremities   HBO Treatment Start Date 10/06/20   HBO Treatment Details   Treatment Number 15   Inpatient Visit No   Total Treatment Length Calc (minutes) 92   Chamber Type Monoplace   Chamber # 2   HBO Dive Log # 561   Treatment Protocol 2.0 ANAND x 90 minutes w/100% oxygen and no air break   Treatment Details   Dive Rate Down 2.0 psi/minute   Dive Rate Up 2.0 psi/minute   Compress Begins 1 ANAND   Clock Time 1039   Tx Pressure Reached 2 ANAND   Clock Time 1047   Decompress Begins 2 ANAND   Clock Time 1203   Decompress Ends 1 ANAND   Clock Time 1211   Pre HBO Vital Signs   BP (!) 142/69   Pulse 92   Resp 20   Temp 97.5 °F (36.4 °C)   Blood Glucose 190   Glucose Meter # wc   Pain Level 0   Post HBO Vital Signs   BP (!) 188/89   Resp 18   Blood Glucose 142   Glucose Meter # wc   Pain Level 0         Arrived awake and alert. ABC's intact. Cleared for treatment by Fara KAY Tolerated treatment without complaints. Cleared from chamber without incidents. Follow up tomorrow for HBO.       ICD-10-CM ICD-9-CM   1. 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 Acute osteomyelitis of left calcaneus  M86.172 730.07        Physician  Supervision  I provided direct supervision and was immediately available to furnish assistance and direction throughout the performance of the procedure.    Emergency Response Team  A trained emergency response team and emergency services were available throughout procedure.

## 2020-11-20 NOTE — PROGRESS NOTES
[facility]                Progress Note    Subjective:       Patient ID: Suyapa Connelly is a 55 y.o. female.    Chief Complaint: Non-healing Wound Follow Up    11/20/2020: F/U visit. No fever. No pain. Wound is improving and measuring smaller. Red grandular tissue. Macerated periwound. Continue same dressing. Increased suction at 125mmHG. Discussed elevating as much as possible.      Review of Systems   Constitutional: Negative.    HENT: Negative.    Respiratory: Negative.    Cardiovascular: Positive for leg swelling.   Gastrointestinal: Negative.    Genitourinary: Negative.    Musculoskeletal: Positive for myalgias.   Skin: Positive for wound.   Psychiatric/Behavioral: Negative.          Objective:        Physical Exam  Constitutional:       Appearance: She is well-developed.      Comments: Oriented to time, place, and person.   Cardiovascular:      Comments: DP and PT pulses are palpable bilaterally. 3 sec capillary refill time and toes and feet are warm to touch proximally .  There is  hair growth on the feet and toes b/l. There is no edema b/l. No spider veins or varicosities present b/l.     Musculoskeletal:      Comments: Equinus noted b/l ankles with < 10 deg DF noted. MMT 5/5 in DF/PF/Inv/Ev resistance with no reproduction of pain in any direction. Passive range of motion of ankle and pedal joints is painless b/l.     Feet:      Right foot:      Skin integrity: No callus or dry skin.      Left foot:      Skin integrity: No callus or dry skin.   Lymphadenopathy:      Comments: Negative lymphadenopathy bilateral popliteal fossa and tarsal tunnel.   Skin:     Comments: See wound description    Neurological:      Mental Status: She is alert.      Comments: Light touch, proprioception, and sharp/dull sensation are all intact bilaterally. Protective threshold with the [place]ien[place] monofilament is intact bilaterally.    Psychiatric:         Behavior: Behavior is  cooperative.         Vitals:    11/20/20 1304   BP: (!) 191/93   Pulse: 94   Resp: 20   Temp: 97.5 °F (36.4 °C)       Assessment:           ICD-10-CM ICD-9-CM   1. Diabetic ulcer of left heel associated with type 2 diabetes mellitus, with bone involvement without evidence of necrosis  E11.621 250.80    L97.426 707.14            Negative Pressure Wound Therapy  11/13/20 Left (Active)   11/13/20    Side: Left   Orientation:    Location: Heel   Additional Comments:    Location:    SDO Location:    NPWT Type Vacuum Therapy 11/20/20 1300   Therapy Setting NPWT Continuous therapy 11/20/20 1300   Pressure Setting NPWT 125 mmHg 11/20/20 1300   Therapy Interventions NPWT Canister changed;Dressing changed;Seal intact 11/20/20 1300   Sponges Inserted NPWT Black;1 11/20/20 1300   Sponges Removed NPWT Black;1 11/20/20 1300   General Output (mL) 10 11/20/20 1300            Incision/Site 09/08/20 1316 Left Heel (Active)   09/08/20 1316   Present Prior to Hospital Arrival?:    Side: Left   Location: Heel   Orientation:    Incision Type:    Closure Method:    Additional Comments:    Removal Indication and Assessment:    Wound Outcome:    Removal Indications:    Wound Image   11/20/20 1300   Incision WDL ex 11/20/20 1300   Dressing Appearance Intact;Moist drainage 11/20/20 1300   Drainage Amount Moderate 11/20/20 1300   Drainage Characteristics/Odor Serosanguineous 11/20/20 1300   Appearance Red;Pink;White;Yellow 11/20/20 1300   Black (%), Wound Tissue Color 0 % 11/20/20 1300   Red (%), Wound Tissue Color 95 % 11/20/20 1300   Yellow (%), Wound Tissue Color 5 % 11/20/20 1300   Periwound Area Macerated 11/20/20 1300   Wound Edges Defined 11/20/20 1300   Wound Length (cm) 0.7 cm 11/20/20 1300   Wound Width (cm) 2.7 cm 11/20/20 1300   Wound Depth (cm) 0.4 cm 11/20/20 1300   Wound Volume (cm^3) 0.76 cm^3 11/20/20 1300   Wound Surface Area (cm^2) 1.89 cm^2 11/20/20 1300   Tunneling (depth (cm)/location) 0 11/20/20 1300   Undermining (depth  (cm)/location) 0 11/20/20 1300   Care Soap and water;Sterile normal saline;Wound cleanser 11/20/20 1300   Dressing Applied;Other (see comments);Foam 11/20/20 1300   Periwound Care Other (see comments) 11/20/20 1300   Off Loading Football dressing 11/20/20 1300   Dressing Change Due 11/24/20 11/20/20 1300           Plan:            Debridement: see procedure note     Dressings: per above  Offloading:Foam    Follow-up:Patient is to return to the clinic in 1 week  for follow-up but should call [facility] immediately if any signs of infection, such as fever, chills, sweats, increased redness or pain.    Short-term goals include maintaining good offloading and minimizing bioburden, promoting granulation and epithelialization to healing.  Long-term goals include keeping the wound healed by good offloading and medical management under the direction of internist.        Orders Placed This Encounter   Procedures    Change dressing     Gentian violet periwound. Apply benzoin/ cavilon, wound vac (black:1), aquacel foam border to anterior ankle,mextra short X1, scott foam long X2, football dressing (cast padding X3, stockinet). Suction at 125mmHG low Con.        No follow-ups on file.

## 2020-11-23 ENCOUNTER — HOSPITAL ENCOUNTER (OUTPATIENT)
Dept: WOUND CARE | Facility: HOSPITAL | Age: 55
Discharge: HOME | End: 2020-11-23
Attending: FAMILY MEDICINE
Payer: MEDICAID

## 2020-11-23 ENCOUNTER — OFFICE VISIT (OUTPATIENT)
Dept: VASCULAR SURGERY | Facility: CLINIC | Age: 55
End: 2020-11-23
Payer: MEDICAID

## 2020-11-23 VITALS
HEIGHT: 65 IN | WEIGHT: 191.13 LBS | BODY MASS INDEX: 31.85 KG/M2 | SYSTOLIC BLOOD PRESSURE: 120 MMHG | DIASTOLIC BLOOD PRESSURE: 80 MMHG

## 2020-11-23 VITALS
SYSTOLIC BLOOD PRESSURE: 170 MMHG | DIASTOLIC BLOOD PRESSURE: 86 MMHG | RESPIRATION RATE: 20 BRPM | TEMPERATURE: 97 F | HEART RATE: 78 BPM

## 2020-11-23 DIAGNOSIS — L97.426 DIABETIC ULCER OF LEFT HEEL ASSOCIATED WITH TYPE 2 DIABETES MELLITUS, WITH BONE INVOLVEMENT WITHOUT EVIDENCE OF NECROSIS: Primary | ICD-10-CM

## 2020-11-23 DIAGNOSIS — E11.621 DIABETIC ULCER OF LEFT HEEL ASSOCIATED WITH TYPE 2 DIABETES MELLITUS, WITH BONE INVOLVEMENT WITHOUT EVIDENCE OF NECROSIS: Primary | ICD-10-CM

## 2020-11-23 DIAGNOSIS — Z95.828 S/P FEMORAL-POPLITEAL BYPASS SURGERY: ICD-10-CM

## 2020-11-23 DIAGNOSIS — I70.344: Primary | ICD-10-CM

## 2020-11-23 DIAGNOSIS — M86.172 ACUTE OSTEOMYELITIS OF LEFT CALCANEUS: ICD-10-CM

## 2020-11-23 LAB — POCT GLUCOSE: 252 MG/DL (ref 70–110)

## 2020-11-23 PROCEDURE — 99215 PR OFFICE/OUTPT VISIT, EST, LEVL V, 40-54 MIN: ICD-10-PCS | Mod: S$PBB,,, | Performed by: SURGERY

## 2020-11-23 PROCEDURE — 82962 GLUCOSE BLOOD TEST: CPT | Performed by: FAMILY MEDICINE

## 2020-11-23 PROCEDURE — 99215 OFFICE O/P EST HI 40 MIN: CPT | Mod: PBBFAC | Performed by: SURGERY

## 2020-11-23 PROCEDURE — 99999 PR PBB SHADOW E&M-EST. PATIENT-LVL V: CPT | Mod: PBBFAC,,, | Performed by: SURGERY

## 2020-11-23 PROCEDURE — 99999 PR PBB SHADOW E&M-EST. PATIENT-LVL V: ICD-10-PCS | Mod: PBBFAC,,, | Performed by: SURGERY

## 2020-11-23 PROCEDURE — 99215 OFFICE O/P EST HI 40 MIN: CPT | Mod: S$PBB,,, | Performed by: SURGERY

## 2020-11-23 NOTE — PROGRESS NOTES
Arrived awake and alert. ABC's intact. Vitals as above. Took blood pressure approximately 5 minutes apart. Glucose levels 252 mg/dl. Sebastian KAY notified. Related to patient that she cannot treat today because her blood pressure is too high. Follow up tomorrow for HBO.      ICD-10-CM ICD-9-CM   1. 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 Acute osteomyelitis of left calcaneus  M86.172 730.07

## 2020-11-23 NOTE — PATIENT INSTRUCTIONS
Peripheral Artery Disease (PAD)     Peripheral artery disease (PAD) occurs when the arteries that carry blood to the limbs are narrowed or blocked. This is usually due to a buildup of a fatty substance called plaque in the walls of the arteries.  PAD most often affects the arteries in the legs. When these arteries are narrowed or blocked, blood flow to the legs is reduced. This can cause leg and foot pain and other symptoms. If severe enough, reduced blood flow to the legs can lead to tissue death (gangrene) and the loss of a toe, foot, or leg. Having PAD also makes it more likely that arteries in other body areas are blocked. For instance, arteries that carry blood to the heart or brain may be affected. This raises the chances of heart attack and stroke.  Risk factors  Certain factors can make PAD more likely. They include:  · Smoking  · Diabetes  · High blood pressure  · Unhealthy cholesterol levels  · Obesity  · Inactive lifestyle  · Older age  · Family history of PAD  Symptoms  Many people with PAD have no symptoms. If symptoms do occur, they can include:  · Pain in the muscles of the calves, thighs or hips that gets worse with activity and better with rest (intermittent claudication)  · Achy, tired, or heavy feeling in the legs  · Weakness, numbness, tingling, or loss of feeling in the legs  · Changes in skin color of the legs  · Sores on the legs and feet  · Cold leg, feet, or toes  · Pain the feet or toes even when lying down (rest pain)  Home care  PAD is a chronic (lifelong) condition. Treatment is focused on managing your condition and lowering your health risks. This may include doing the following:  · If you smoke, quit. This helps prevent further damage to your arteries and lowers your health risks. Ask your provider about medicines or products that can help you quit smoking. Also consider joining a stop-smoking program or support group.  · Be more active. This helps you lose weight and manage  problems such as high blood pressure and unhealthy cholesterol levels. Start a walking program if advised to by your provider. Your provider may also help you form a safe exercise program that is right for your needs.  · Make healthy eating changes. This includes eating less fat, salt, and sugar.  · Take medicines for high blood pressure, unhealthy cholesterol levels, and diabetes as directed.  · Have your blood pressure and cholesterol levels checked as often as directed.  · If you have diabetes, try to keep your blood sugar well controlled. Test your blood sugar as directed.  · If you are overweight, talk to your provider about forming a weight-loss plan.  · Watch for cuts, scrapes, or open sores on your feet. Poor blood flow to the feet may slow healing and increase the risk of infection from these problems.   Follow-up care  Follow up with your healthcare provider, or as advised. If imaging tests such as ultrasound were done, they will be reviewed by a doctor. You will be told the results and any new findings that may affect your care.  When to seek medical advice   Call your healthcare provider right away if any of these occur:  · Sudden severe pain in the legs or feet  · Sudden cold, pale or blue color in the legs or feet  · Weakness or numbness in the legs or feet that worsens  · Any sore or wound in the legs or feet that wont heal  · Weak pulse in your legs or feet  Know the Signs of Heart Attack and Stroke  People with PAD are at high risk for heart attack and stroke. Knowing the signs of these problems can help you protect your health and get help when you need it. Call 911 right away if you have any of the following:  Signs of a Heart Attack  · Chest discomfort, such as pain, aching, tightness, or pressure that lasts more than a few minutes, or that comes and goes  · Pain or discomfort in the arms, back, shoulders, neck, or jaw  · Shortness of breath  · Sweating (often a cold, clammy  sweat)  · Nausea  · Lightheadedness  Signs of a Stroke  · Sudden numbness or weakness of the face, arms, or legs, especially on one side  · Sudden confusion or trouble speaking or understanding  · Sudden trouble seeing in one or both eyes  · Sudden trouble walking, dizziness, or loss of balance  · Sudden, severe headache with no known cause   Date Last Reviewed: 9/21/2015  © 1208-7469 [employer]. [address] [place] [address], [place] [address]. All rights reserved. This information is not intended as a substitute for professional medical care. Always follow your healthcare professional's instructions.          Wound Care  Taking proper care of your wound will help it heal. Your healthcare provider may show you how to clean and dress the wound. He or she will also explain how to tell if the wound is healing normally. If you are unsure of how to take care of the wound, be sure to clarify what dressing to use and how often you should change the bandages. Here are the basic steps.     A wound that's not healing normally may be dark in color or have white streaks.   Wash your hands  Tips for washing your hands include:  · Use liquid soap and lather for 2 minutes. Scrub between your fingers and under your nails.  · Rinse with warm water, keeping your fingers pointing down.  · Use a paper towel to dry your hands and to turn off the faucet.  Remove the used dressing  Here are suggestions for removing the dressing:  · If dressing changes cause you pain, be sure to take your pain medicine as prescribed by your healthcare provider 30 minutes before dressing changes.  · Set up your supplies.  · Put on disposable gloves if youre dressing a wound for someone else or your wound is infected.  · Loosen the tape by pulling gently toward the wound.  · Gently take off the old dressing. If the dressing is stuck to the wound, moisten it with saline (if available) or clean water.  · If you have a drain or tube in the wound, be  careful not to pull on it.  · Remove the dressing 1 layer at a time and put it in a plastic bag.  · Remove your gloves.  Inspect and dress the wound  Check the wound carefully:  · Each time you change the dressing, inspect the wound carefully to be sure its healing normally by making sure your wound appears to be pink and moist, and is free of infection.    · Wash your hands again. Put on a new pair of gloves.  · Clean and dress the wound as directed by your healthcare provider or nurse. Do not put anything in the wound that is not prescribed or directed by your healthcare provider. If you have a drain or tube, be careful not to pull on it. Make sure to secure the drain or tube as well.  · Put all supplies in a plastic bag. Seal the bag and put it in the trash.  · Be sure to wash your hands again.  Call your healthcare provider  Call your healthcare provider if you see any of the following signs of a problem:  · Bleeding that soaks the dressing  · Pink fluid weeping from the wound  · Increased drainage or drainage that is yellow, yellow-green, or foul-smelling  · Increased swelling or pain, or redness or swelling in the skin around the wound  · A change in the color of the wound, or if streaks develop in a direction away from the wound  · The area between any stitches opens up  · An increase in the size of the wound  · A fever of 100.4°F (38°C) or higher, or as directed by your healthcare provider  · Chills, increased fatigue, or a loss of appetite      Date Last Reviewed: 7/30/2015  © 1509-0907 [employer] [address] [place] [address], [place] [address]. All rights reserved. This information is not intended as a substitute for professional medical care. Always follow your healthcare professional's instructions.

## 2020-11-23 NOTE — PROGRESS NOTES
Teddy Huber MD [facility]                         11/23/2020    HPI:  Suyapa Connelly is a 55 y.o. female with   Patient Active Problem List   Diagnosis    Peripheral artery disease    Peripheral vascular disease    PAD (peripheral artery disease)    Hypokalemia, gastrointestinal losses    Type 2 diabetes mellitus without complication, with long-term current use of insulin    Panic attack as reaction to stress    Malnutrition of moderate degree    Essential hypertension    Vitamin D deficiency    CAROLIN (generalized anxiety disorder)    MDD (major depressive disorder), recurrent episode, moderate    Bereavement    Injury to kidney    Chronic combined systolic and diastolic heart failure    Mixed hyperlipidemia    Dilated cardiomyopathy    Acute coronary syndrome    Acute on chronic combined systolic (congestive) and diastolic (congestive) heart failure    Type 2 diabetes mellitus with hyperglycemia, with long-term current use of insulin    Coronary artery disease involving native coronary artery of native heart without angina pectoris    Contrast dye induced nephropathy    Weakness    S/P CABG x 1    Acute blood loss anemia    Paroxysmal atrial fibrillation    Alteration in skin integrity in adult    Preventive measure    Ischemic cardiomyopathy    Non healing left heel wound    Acute osteomyelitis of left calcaneus    Stage 3 chronic kidney disease    Critical lower limb ischemia    S/P femoral-popliteal bypass surgery    Diabetic ulcer of left heel associated with type 2 diabetes mellitus, with bone involvement without evidence of necrosis    Ischemia of lower extremity    being managed by PCP and specialists who is here today for evaluation of PVD.  S/p L fem-BK pop prosthetic bypass.  Doing well.  Patient has no complaints of claudication.  She does have c/o LLE edema. Patient states location is LLE occurring for a few  weeks.  Associated signs and symptoms include decreased ROM although she is ambulating well and had her L foot wound debrided recently with good outcomes.  Quality is heavy and severity is 5/10.  Symptoms began a few weeks ago.  Alleviating factors include elevation.  Worsening factors include dependency.  No rest pain.  + tissue loss.  Patient is diabetic.  Is not on Pletal.  + previous lower extremity interventions.    no MI  no Stroke  Tobacco use: no  Daily Aspirin: yes  Anticoagulation: no    10/2020:  No new complaints.  States LLE edema improved.  Receiving HBO.    10/26/20:  Doing well without complaints.    11/2020:  Patient is doing well, states wound care has nearly healed her wound.  Left lower extremity edema has significantly improved    Past Medical History:   Diagnosis Date    Anxiety     Depression     Diabetes mellitus     type 2    Diabetes mellitus, type 2     GERD (gastroesophageal reflux disease)     Hyperlipemia     Hypertension     Myocardial infarction 2010    minor-caused by stress per pt.    Vaginal delivery     x1     Past Surgical History:   Procedure Laterality Date    Angiogram - Right Extremity Right 7/9/15    angiogram-left leg  10/6/15    CATHETERIZATION OF BOTH LEFT AND RIGHT HEART N/A 12/18/2019    Procedure: CATHETERIZATION, HEART, BOTH LEFT AND RIGHT;  Surgeon: Que Fernando III, MD;  Location: [facility] CATH LAB;  Service: Cardiology;  Laterality: N/A;    CORONARY ANGIOGRAPHY N/A 12/18/2019    Procedure: ANGIOGRAM, CORONARY ARTERY;  Surgeon: Que Fernando III, MD;  Location: [facility] CATH LAB;  Service: Cardiology;  Laterality: N/A;    CORONARY ANGIOGRAPHY INCLUDING BYPASS GRAFTS WITH CATHETERIZATION OF LEFT HEART N/A 7/28/2020    Procedure: ANGIOGRAM, CORONARY, INCLUDING BYPASS GRAFT, WITH LEFT HEART CATHETERIZATION, 9 am;  Surgeon: Rachel Easley MD;  Location: [facility] CATH LAB;  Service: Cardiology;  Laterality: N/A;    CORONARY ARTERY BYPASS GRAFT (CABG) N/A  1/14/2020    Procedure: CORONARY ARTERY BYPASS GRAFT (CABG) x 1     Off Pump;  Surgeon: Huang Altamirano MD;  [address];  Service: Cardiovascular;  Laterality: N/A;    CREATION OF FEMOROPOPLITEAL ARTERIAL BYPASS USING GRAFT Left 8/18/2020    Procedure: CREATION, BYPASS, ARTERIAL, FEMORAL TO POPLITEAL, USING GRAFT, LEFT LOWER EXTREMITY;  Surgeon: Teddy Huber MD;  Location: [facility];  Service: Vascular;  Laterality: Left;  REQUEST 7:15 A.M. START----COVID NEGATIVE ON 8/17  1ST CASE STARTE PER LEANA ON 8/7/2020 @ 942AM-LO  RN PREOP 8/12/2020   T/S-----CLEARED BY CARDS-------PENDING INSURANCE    DEBRIDEMENT OF FOOT Left 9/8/2020    Procedure: DEBRIDEMENT, FOOT;  Surgeon: Rosio Mayes DPM;  Location: [facility];  Service: Podiatry;  Laterality: Left;  request neoxx .   RN Pre Op 9-4-2020, Covid negative on 9/5/20. C A    INSERTION OF TUNNELED CENTRAL VENOUS HEMODIALYSIS CATHETER N/A 1/27/2020    Procedure: Insertion, Catheter, Central Venous, Hemodialysis;  Surgeon: ESTEBAN Gomez III, MD;  Location: [facility] CATH LAB;  Service: Peripheral Vascular;  Laterality: N/A;     Family History   Problem Relation Age of Onset    Anesthesia problems Neg Hx      Social History     Socioeconomic History    Marital status: Legally      Spouse name: Not on file    Number of children: Not on file    Years of education: Not on file    Highest education level: Not on file   Occupational History    Not on file   Social Needs    Financial resource strain: Not on file    Food insecurity     Worry: Not on file     Inability: Not on file    Transportation needs     Medical: Not on file     Non-medical: Not on file   Tobacco Use    Smoking status: Never Smoker    Smokeless tobacco: Never Used   Substance and Sexual Activity    Alcohol use: No    Drug use: Yes     Types: Marijuana     Comment: occassional    Sexual activity: Not on file   Lifestyle    Physical activity     Days per week: Not  on file     Minutes per session: Not on file    Stress: Not on file   Relationships    Social connections     Talks on phone: Not on file     Gets together: Not on file     Attends Hindu service: Not on file     Active member of club or organization: Not on file     Attends meetings of clubs or organizations: Not on file     Relationship status: Not on file   Other Topics Concern    Not on file   Social History Narrative    Not on file       Current Outpatient Medications:     allopurinoL (ZYLOPRIM) 100 MG tablet, Take 1 tablet (100 mg total) by mouth once daily., Disp: 30 tablet, Rfl: 5    amiodarone (PACERONE) 200 MG Tab, Take 1 tablet (200 mg total) by mouth once daily., Disp: 90 tablet, Rfl: 3    aspirin 81 MG Chew, Take 1 tablet (81 mg total) by mouth once daily., Disp: 90 tablet, Rfl: 3    atorvastatin (LIPITOR) 80 MG tablet, Take 1 tablet (80 mg total) by mouth once daily., Disp: 30 tablet, Rfl: 5    carvediloL (COREG) 6.25 MG tablet, Take 6.25 mg by mouth once daily. Pt thinks she take BID. Unsure., Disp: , Rfl:     clopidogreL (PLAVIX) 75 mg tablet, Take 1 tablet (75 mg total) by mouth once daily., Disp: 30 tablet, Rfl: 11    hydrALAZINE (APRESOLINE) 25 MG tablet, Take 1 tablet (25 mg total) by mouth every 8 (eight) hours., Disp: 90 tablet, Rfl: 5    insulin aspart U-100 (NOVOLOG) 100 unit/mL injection, Inject 9 Units into the skin 3 (three) times daily with meals., Disp: 10 mL, Rfl: 5    insulin glargine 100 units/mL (3mL) SubQ pen, Inject 10 Units into the skin every evening. (Patient taking differently: Inject 10 Units into the skin every evening. Lantus), Disp: 9 mL, Rfl: 1    metoprolol succinate (TOPROL-XL) 50 MG 24 hr tablet, , Disp: , Rfl:     multivitamin (THERAGRAN) per tablet, Take 1 tablet by mouth once daily., Disp: , Rfl:     oxyCODONE-acetaminophen (PERCOCET) 5-325 mg per tablet, Take 1 tablet by mouth every 6 (six) hours as needed for Pain., Disp: 25 tablet, Rfl: 0     sevelamer carbonate (RENVELA) 800 mg Tab, Take 1 tablet (800 mg total) by mouth 3 (three) times daily with meals., Disp: 90 tablet, Rfl: 11    acetaminophen (TYLENOL) 500 MG tablet, Take 1,000 mg by mouth daily as needed for Pain., Disp: , Rfl:     blood sugar diagnostic (ONETOUCH ULTRA BLUE TEST STRIP) Strp, Use to test blood glucose twice daily (Patient not taking: Reported on 11/23/2020), Disp: 100 each, Rfl: 11    collagenase (SANTYL) ointment, Apply topically once daily. (Patient not taking: Reported on 11/23/2020), Disp: 250 g, Rfl: 0    escitalopram oxalate (LEXAPRO) 10 MG tablet, Take 1 tablet (10 mg total) by mouth once daily. (Patient not taking: Reported on 10/5/2020), Disp: 30 tablet, Rfl: 5    gabapentin (NEURONTIN) 300 MG capsule, Take 1 capsule (300 mg total) by mouth 3 (three) times daily. (Patient not taking: Reported on 10/26/2020), Disp: 90 capsule, Rfl: 5    lancets 30 gauge Misc, use to test blood glucose 2 (two) times daily with meals. (Patient not taking: Reported on 11/23/2020), Disp: 100 each, Rfl: 11    ondansetron (ZOFRAN-ODT) 4 MG TbDL, Take 1 tablet (4 mg total) by mouth every 12 (twelve) hours as needed (vomiting). (Patient not taking: Reported on 11/23/2020), Disp: 10 tablet, Rfl: 0    oxyCODONE-acetaminophen (PERCOCET) 5-325 mg per tablet, Take 1 tablet by mouth every 6 (six) hours as needed for Pain. (Patient not taking: Reported on 11/23/2020), Disp: 28 tablet, Rfl: 0    oxyCODONE-acetaminophen (PERCOCET) 5-325 mg per tablet, Take 1 tablet by mouth every 6 (six) hours as needed for Pain. (Patient not taking: Reported on 11/23/2020), Disp: 28 tablet, Rfl: 0    piperacillin sodium/tazobactam (PIPERACILLIN-TAZOBACTAM 4.5G/100ML SODIUM CHLORIDE 0.9%-READY TO MIX), Inject 100 mLs (4.5 g total) into the vein every 8 (eight) hours. (Patient not taking: Reported on 9/30/2020), Disp:  , Rfl:     torsemide (DEMADEX) 20 MG Tab, TAKE 2 TABLETS BY MOUTH ONCE DAILY (Patient not taking:  Reported on 9/30/2020), Disp: 180 tablet, Rfl: 3    REVIEW OF SYSTEMS:  General: No fevers or chills; ENT: No sore throat; Allergy and Immunology: no persistent infections; Hematological and Lymphatic: No history of bleeding or easy bruising; Endocrine: negative; Respiratory: no cough, shortness of breath, or wheezing; Cardiovascular: no chest pain or dyspnea on exertion; no claudication, no rest pain; Gastrointestinal: no abdominal pain/back, change in bowel habits, or bloody stools; Genito-Urinary: no dysuria, trouble voiding, or hematuria; Musculoskeletal: negative, + wound; Neurological: no TIA or stroke symptoms; Psychiatric: no nervousness, anxiety or depression.    PHYSICAL EXAM:                General appearance:  Alert, well-appearing, and in no distress.  Oriented to person, place, and time                    Neurological: Normal speech, no focal findings noted; CN II - XII grossly intact. RLE with sensation to light touch, LLE with sensation to light touch.            Musculoskeletal: Digits/nail without cyanosis/clubbing.  Strength 5/5 BLE.                    Neck: Supple, no significant adenopathy, no carotid bruit can be auscultated                  Chest:  Clear to auscultation, no wheezes, rales or rhonchi, symmetric air entry. No use of accessory muscles               Cardiac: Normal rate and regular rhythm, S1 and S2 normal            Abdomen: Soft, nontender, nondistended, no masses or organomegaly, no hernia     No rebound tenderness noted; bowel sounds normal     Pulsatile aortic mass is non palpable.     No groin adenopathy      Extremities:2+ R femoral pulse, 2+ L femoral pulse     1+ R popliteal pulse, non palp L popliteal pulse     1+ R PT pulse, 1+ L PT pulse     1+ R DP pulse, 1+ L DP pulse     no RLE edema, 1+ LLE edema    Skin: RLE without tissue loss; LLE with L heel tissue loss +granulation and min fibrionous tissue, no surrounding infection; L groin inc nearly fully healed, L leg inc  fully healed    LAB RESULTS:  No results found for: CBC  Lab Results   Component Value Date    LABPROT 10.3 08/24/2020    INR 0.9 08/24/2020     Lab Results   Component Value Date     08/24/2020    K 3.4 (L) 08/24/2020     08/24/2020    CO2 20 (L) 08/24/2020     (H) 08/24/2020    BUN 18 08/24/2020    CREATININE 1.5 (H) 08/24/2020    CALCIUM 8.5 (L) 08/24/2020    ANIONGAP 11 08/24/2020    EGFRNONAA 39 (A) 08/24/2020     Lab Results   Component Value Date    WBC 8.09 08/24/2020    RBC 2.35 (L) 08/24/2020    HGB 7.4 (L) 08/24/2020    HCT 23.5 (L) 08/24/2020     (H) 08/24/2020    MCH 31.5 (H) 08/24/2020    MCHC 31.5 (L) 08/24/2020    RDW 14.1 08/24/2020     (H) 08/24/2020    MPV 10.7 08/24/2020    GRAN 4.6 08/24/2020    GRAN 56.2 08/24/2020    LYMPH 1.9 08/24/2020    LYMPH 24.0 08/24/2020    MONO 1.0 08/24/2020    MONO 11.9 08/24/2020    EOS 0.5 08/24/2020    BASO 0.06 08/24/2020    EOSINOPHIL 6.6 08/24/2020    BASOPHIL 0.7 08/24/2020    DIFFMETHOD Automated 08/24/2020     .  Lab Results   Component Value Date    HGBA1C 10.2 (H) 08/26/2020       IMAGING:  All pertinent imaging has been reviewed and interpreted independently.    ATTILA / TP 9/2020:  ATTILA NA/1.8, TP 29/128    LLE arterial US with patent bypass and 50% stenosis of distal anastomosis    10/2020:  No LLE DVT, simple fluid collection L groin without evidence of flow or rind    IMP/PLAN:  55 y.o. female with   Patient Active Problem List   Diagnosis    Peripheral artery disease    Peripheral vascular disease    PAD (peripheral artery disease)    Hypokalemia, gastrointestinal losses    Type 2 diabetes mellitus without complication, with long-term current use of insulin    Panic attack as reaction to stress    Malnutrition of moderate degree    Essential hypertension    Vitamin D deficiency    CAROLIN (generalized anxiety disorder)    MDD (major depressive disorder), recurrent episode, moderate    Bereavement    Injury to  kidney    Chronic combined systolic and diastolic heart failure    Mixed hyperlipidemia    Dilated cardiomyopathy    Acute coronary syndrome    Acute on chronic combined systolic (congestive) and diastolic (congestive) heart failure    Type 2 diabetes mellitus with hyperglycemia, with long-term current use of insulin    Coronary artery disease involving native coronary artery of native heart without angina pectoris    Contrast dye induced nephropathy    Weakness    S/P CABG x 1    Acute blood loss anemia    Paroxysmal atrial fibrillation    Alteration in skin integrity in adult    Preventive measure    Ischemic cardiomyopathy    Non healing left heel wound    Acute osteomyelitis of left calcaneus    Stage 3 chronic kidney disease    Critical lower limb ischemia    S/P femoral-popliteal bypass surgery    Diabetic ulcer of left heel associated with type 2 diabetes mellitus, with bone involvement without evidence of necrosis    Ischemia of lower extremity    being managed by PCP and specialists who is here today for evaluation of PVD.    -s/p L fem-pop prosthetic bypass 8/18/20, recovering well - keep inc clean/dry, cont daily ASA; will repeat arterial ultrasound evaluate distal anastomosis stenosis  -Cont wound care and offloading shoe  -Exercise  -Heart healthy lifestyle  -RTC 3 months for 6 month postop visit with arterial ultrasound ABIs and toe pressures    I spent 12 minutes evaluating this patient and greater than 50% of the time was spent counseling, coordinator care and discussing the plan of care.  All questions were answered and patient stated understanding with agreement with the above treatment plan.    Teddy Huber MD [facility]  Vascular and Endovascular Surgery

## 2020-11-24 ENCOUNTER — HOSPITAL ENCOUNTER (OUTPATIENT)
Dept: WOUND CARE | Facility: HOSPITAL | Age: 55
Discharge: HOME | End: 2020-11-24
Attending: FAMILY MEDICINE
Payer: MEDICAID

## 2020-11-24 VITALS
SYSTOLIC BLOOD PRESSURE: 160 MMHG | RESPIRATION RATE: 20 BRPM | DIASTOLIC BLOOD PRESSURE: 82 MMHG | TEMPERATURE: 98 F | HEART RATE: 74 BPM

## 2020-11-24 DIAGNOSIS — E11.621 DIABETIC ULCER OF LEFT HEEL ASSOCIATED WITH TYPE 2 DIABETES MELLITUS, WITH BONE INVOLVEMENT WITHOUT EVIDENCE OF NECROSIS: Primary | ICD-10-CM

## 2020-11-24 DIAGNOSIS — M86.172 ACUTE OSTEOMYELITIS OF LEFT CALCANEUS: ICD-10-CM

## 2020-11-24 DIAGNOSIS — L97.426 DIABETIC ULCER OF LEFT HEEL ASSOCIATED WITH TYPE 2 DIABETES MELLITUS, WITH BONE INVOLVEMENT WITHOUT EVIDENCE OF NECROSIS: Primary | ICD-10-CM

## 2020-11-24 LAB
POCT GLUCOSE: 104 MG/DL (ref 70–110)
POCT GLUCOSE: 139 MG/DL (ref 70–110)
POCT GLUCOSE: 74 MG/DL (ref 70–110)

## 2020-11-24 PROCEDURE — G0277 HBOT, FULL BODY CHAMBER, 30M: HCPCS | Performed by: FAMILY MEDICINE

## 2020-11-24 PROCEDURE — 97605 NEG PRS WND THER DME<=50SQCM: CPT

## 2020-11-24 PROCEDURE — 99183 PR HYPERBARIC OXYGEN THERAPY ATTENDANCE/SUPERVISION, PER SESSION: ICD-10-PCS | Mod: ,,, | Performed by: FAMILY MEDICINE

## 2020-11-24 PROCEDURE — 82962 GLUCOSE BLOOD TEST: CPT | Performed by: FAMILY MEDICINE

## 2020-11-24 PROCEDURE — 99183 HYPERBARIC OXYGEN THERAPY: CPT | Mod: ,,, | Performed by: FAMILY MEDICINE

## 2020-11-24 NOTE — PROGRESS NOTES
[facility]  [address]  [id number]  Nurse Visit    Subjective:       Patient seen in clinic today.  Dressing changed as ordered.      Assessment:          Negative Pressure Wound Therapy  11/13/20 Left (Active)   11/13/20    Side: Left   Orientation:    Location: Heel   Additional Comments:    Location:    SDO Location:    NPWT Type Vacuum Therapy 11/24/20 1400   Therapy Setting NPWT Continuous therapy 11/24/20 1400   Pressure Setting NPWT 125 mmHg 11/24/20 1400   Therapy Interventions NPWT Dressing changed;Seal intact 11/24/20 1400   Sponges Inserted NPWT Black;1 11/24/20 1400   Sponges Removed NPWT Black;1 11/24/20 1400   General Output (mL) 15 11/24/20 1400            Incision/Site 09/08/20 1316 Left Heel (Active)   09/08/20 1316   Present Prior to Hospital Arrival?:    Side: Left   Location: Heel   Orientation:    Incision Type:    Closure Method:    Additional Comments:    Removal Indication and Assessment:    Wound Outcome:    Removal Indications:    Incision WDL ex 11/24/20 1400   Dressing Appearance Intact;Moist drainage 11/24/20 1400   Drainage Amount Moderate 11/24/20 1400   Drainage Characteristics/Odor Serosanguineous 11/24/20 1400   Appearance Pink;Red;White;Slough 11/24/20 1400   Black (%), Wound Tissue Color 0 % 11/24/20 1400   Red (%), Wound Tissue Color 90 % 11/24/20 1400   Yellow (%), Wound Tissue Color 10 % 11/24/20 1400   Periwound Area Macerated 11/24/20 1400   Wound Edges Defined 11/24/20 1400   Tunneling (depth (cm)/location) 0 11/24/20 1400   Undermining (depth (cm)/location) 0 11/24/20 1400   Care Soap and water;Sterile normal saline;Wound cleanser 11/24/20 1400   Dressing Applied;Other (see comments);Foam 11/24/20 1400   Periwound Care Other (see comments) 11/24/20 1400   Off Loading Football dressing 11/24/20 1400   Dressing Change Due 11/27/20 11/24/20 1400           Plan:     No orders of the defined types were placed in this  encounter.          No follow-ups on file.

## 2020-11-24 NOTE — PROGRESS NOTES
11/24/20 1457   Hyperbaric Pre-Inspection   Mattress cleaned prior to treatment Yes   2 Patient Identifiers Verified Yes   Consent Obtained Yes   Cotton Gown Yes   Patient voided Yes   Drainage Bags Emptied Not applicable   Patient Pregnant No   Glasses, Jewelry, Makeup, etc. Removed Yes   Dentures, Hearing Aid, Prosthetic Devices Removed Yes   Touch hair to check for hair spray Yes   Cold/Flu Symptoms No   Diabetic Patient Eaten No   Medications Given No   Fears and apprehensions verbalized Yes   Ground Wire Secured Yes   HBO Treatment Course Details   Treatment Course Number 1   Ordering Provider Suman KAY   Indications Diabetic wounds of lower extremities   HBO Treatment Start Date 10/06/20   HBO Treatment Details   Treatment Number 16   Inpatient Visit No   Total Treatment Length Calc (minutes) 40   Chamber Type Monoplace   Chamber # 2   HBO Dive Log # [id number]   Treatment Protocol 2.0 ANAND x 90 minutes w/100% oxygen and no air break   Treatment Details   Dive Rate Down 2.0 psi/minute   Dive Rate Up 2.5 psi/minute   Compress Begins 1 ANAND   Clock Time 1324   Tx Pressure Reached 2 ANAND   Clock Time 1332   Decompress Begins 2 ANAND   Clock Time 1358   Decompress Ends 1 ANAND   Clock Time 1404   Pre HBO Vital Signs   BP (!) 150/76   Pulse 87   Resp 20   Temp 97.7 °F (36.5 °C)   Blood Glucose 139   Glucose Meter # wc   Pain Level 0   Post HBO Vital Signs   BP (!) 146/79  (Repeat bp 10 mins post 160/82)   Pulse 79  (Repeat pulse 10 mins post 74)   Resp (!) 22   Blood Glucose 74  (repeat glucose at 1432 was 104 mg/dl)   Glucose Meter # wc   Action Taken gave patient juice and pepermints   Pain Level 0       Arrived awake and alert. ABC's intact. Cleared for treatment by Catherine KAY Tolerated descent without complaints. At 1358 pt pounded on chamber acrylic related she was feeling her glucose levels were dropping. Started ascent and notified Catherine KAY Communicated to patient that we were going to stop her treatment early  and get her out.  Dr ordered ascent to 2.5 psi/min. Evacuated patient out of chamber and checked vitals as above. Pt is awake and alert but diaphoretic and nauseated. She related she was nauseated and didn't eat this am prior to coming into her treatment. Kept patient approximately 15 minutes to make sure she was improving before having her wound vac changed. Pt showed improvement in vitals and related she was feeling better prior to getting wound care.    Follow up tomorrow for HBO.             ICD-10-CM ICD-9-CM   1. 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 Acute osteomyelitis of left calcaneus  M86.172 730.07        Physician Supervision  I provided direct supervision and was immediately available to furnish assistance and direction throughout the performance of the procedure.    Emergency Response Team  A trained emergency response team and emergency services were available throughout procedure.

## 2020-11-27 ENCOUNTER — HOSPITAL ENCOUNTER (OUTPATIENT)
Dept: WOUND CARE | Facility: HOSPITAL | Age: 55
Discharge: HOME | End: 2020-11-27
Attending: PODIATRIST
Payer: MEDICAID

## 2020-11-27 ENCOUNTER — HOSPITAL ENCOUNTER (OUTPATIENT)
Dept: WOUND CARE | Facility: HOSPITAL | Age: 55
Discharge: HOME | End: 2020-11-27
Attending: INTERNAL MEDICINE
Payer: MEDICAID

## 2020-11-27 VITALS — DIASTOLIC BLOOD PRESSURE: 90 MMHG | TEMPERATURE: 98 F | HEART RATE: 81 BPM | SYSTOLIC BLOOD PRESSURE: 177 MMHG

## 2020-11-27 DIAGNOSIS — L97.426 DIABETIC ULCER OF LEFT HEEL ASSOCIATED WITH TYPE 2 DIABETES MELLITUS, WITH BONE INVOLVEMENT WITHOUT EVIDENCE OF NECROSIS: Primary | ICD-10-CM

## 2020-11-27 DIAGNOSIS — E11.621 DIABETIC ULCER OF LEFT HEEL ASSOCIATED WITH TYPE 2 DIABETES MELLITUS, WITH BONE INVOLVEMENT WITHOUT EVIDENCE OF NECROSIS: Primary | ICD-10-CM

## 2020-11-27 PROCEDURE — 11042 DBRDMT SUBQ TIS 1ST 20SQCM/<: CPT | Performed by: PODIATRIST

## 2020-11-27 PROCEDURE — 27201912 HC WOUND CARE DEBRIDEMENT SUPPLIES: Performed by: PODIATRIST

## 2020-11-27 PROCEDURE — 99499 UNLISTED E&M SERVICE: CPT | Mod: ,,, | Performed by: PODIATRIST

## 2020-11-27 PROCEDURE — 11042 WOUND DEBRIDEMENT: ICD-10-PCS | Mod: ,,, | Performed by: PODIATRIST

## 2020-11-27 PROCEDURE — 99499 NO LOS: ICD-10-PCS | Mod: ,,, | Performed by: PODIATRIST

## 2020-11-27 PROCEDURE — 11042 DBRDMT SUBQ TIS 1ST 20SQCM/<: CPT | Mod: ,,, | Performed by: PODIATRIST

## 2020-11-27 RX ORDER — OXYCODONE AND ACETAMINOPHEN 5; 325 MG/1; MG/1
1 TABLET ORAL EVERY 6 HOURS PRN
Qty: 28 TABLET | Refills: 0 | Status: SHIPPED | OUTPATIENT
Start: 2020-11-27 | End: 2021-01-06 | Stop reason: ALTCHOICE

## 2020-11-27 NOTE — PROGRESS NOTES
[facility]                Progress Note    Subjective:       Patient ID: Suyapa Connelly is a 55 y.o. female.    Chief Complaint: No chief complaint on file.    Walked to clinic unaided. No complaints pain. Takes Percocet to keep pain at bay. Dressing removed in its entirety. Wound pink with maceration surrounding, no drainage in container. No leak alarm.  Wound vac  Increased to 125 medium intermittent.       Review of Systems   Constitutional: Negative.    HENT: Negative.    Respiratory: Negative.    Cardiovascular: Positive for leg swelling.   Gastrointestinal: Negative.    Genitourinary: Negative.    Musculoskeletal: Positive for arthralgias.   Skin: Positive for wound.   Neurological: Negative.    Psychiatric/Behavioral: Negative.          Objective:        Physical Exam  Constitutional:       Appearance: She is well-developed.      Comments: Oriented to time, place, and person.   Cardiovascular:      Comments: DP and PT pulses are palpable bilaterally. 3 sec capillary refill time and toes and feet are warm to touch proximally .  There is  hair growth on the feet and toes b/l. There is no edema b/l. No spider veins or varicosities present b/l.     Musculoskeletal:      Comments: Equinus noted b/l ankles with < 10 deg DF noted. MMT 5/5 in DF/PF/Inv/Ev resistance with no reproduction of pain in any direction. Passive range of motion of ankle and pedal joints is painless b/l.     Feet:      Right foot:      Skin integrity: No callus or dry skin.      Left foot:      Skin integrity: No callus or dry skin.   Lymphadenopathy:      Comments: Negative lymphadenopathy bilateral popliteal fossa and tarsal tunnel.   Skin:     Comments: See wound description      Neurological:      Mental Status: She is alert.      Comments: Light touch, proprioception, and sharp/dull sensation are all intact bilaterally. Protective threshold with the Bedford-Wienstein monofilament is intact  bilaterally.    Psychiatric:         Behavior: Behavior is cooperative.         Vitals:    11/27/20 1311   BP: (!) 177/90   Pulse: 81   Temp: 97.5 °F (36.4 °C)       Assessment:           ICD-10-CM ICD-9-CM   1. Diabetic ulcer of left heel associated with type 2 diabetes mellitus, with bone involvement without evidence of necrosis  E11.621 250.80    L97.426 707.14            Negative Pressure Wound Therapy  11/13/20 Left (Active)   11/13/20    Side: Left   Orientation:    Location: Heel   Additional Comments:    Location:    SDO Location:    NPWT Type Vacuum Therapy 11/27/20 1300   Therapy Setting NPWT Intermittent therapy 11/27/20 1300   Pressure Setting NPWT 125 mmHg 11/27/20 1300   Sponges Inserted NPWT Black 11/27/20 1300   Sponges Removed NPWT Black 11/27/20 1300   General Output (mL) 0 11/27/20 1300            Incision/Site 09/08/20 1316 Left Heel (Active)   09/08/20 1316   Present Prior to Hospital Arrival?:    Side: Left   Location: Heel   Orientation:    Incision Type:    Closure Method:    Additional Comments:    Removal Indication and Assessment:    Wound Outcome:    Removal Indications:    Wound Image   11/27/20 1300   Incision WDL ex 11/27/20 1300   Dressing Appearance Dry;Intact 11/27/20 1300   Drainage Amount Small 11/27/20 1300   Drainage Characteristics/Odor Clear 11/27/20 1300   Appearance [place] 11/27/20 1300   Red (%), Wound Tissue Color 100 % 11/27/20 1300   Periwound Area Intact 11/27/20 1300   Wound Edges Defined 11/27/20 1300   Wound Length (cm) 1 cm 11/27/20 1300   Wound Width (cm) 2.5 cm 11/27/20 1300   Wound Depth (cm) 0.7 cm 11/27/20 1300   Wound Volume (cm^3) 1.75 cm^3 11/27/20 1300   Wound Surface Area (cm^2) 2.5 cm^2 11/27/20 1300   Care Cleansed with:;Antimicrobial agent;Sterile normal saline 11/27/20 1300   Dressing Changed 11/27/20 1300   Dressing Change Due 12/04/20 11/27/20 1300           Plan:          Debridement: see procedure note     Dressings:per  above  Offloading:Foam    Follow-up:Patient is to return to the clinic in 1 week  for follow-up but should call [facility] immediately if any signs of infection, such as fever, chills, sweats, increased redness or pain.    Short-term goals include maintaining good offloading and minimizing bioburden, promoting granulation and epithelialization to healing.  Long-term goals include keeping the wound healed by good offloading and medical management under the direction of internist.          Orders Placed This Encounter   Procedures    Change dressing     Wound vac at 125 mm and medium suction intermittent. Dsg applied to wound with black foam protected from skin to top of foot where suction applied. Mextra and two long foams,  Staggered, football with stockinet.        Follow up in about 1 week (around 12/4/2020).

## 2020-11-27 NOTE — PROGRESS NOTES
[facility]                Progress Note    Subjective:       Patient ID: Suyapa Connelly is a 55 y.o. female.    Chief Complaint: No chief complaint on file.    HPI    Review of Systems      Objective:        Physical Exam    Vitals:    11/27/20 1311   BP: (!) 177/90   Pulse: 81   Temp: 97.5 °F (36.4 °C)       Assessment:         No diagnosis found.         Negative Pressure Wound Therapy  11/13/20 Left (Active)   11/13/20    Side: Left   Orientation:    Location: Heel   Additional Comments:    Location:    SDO Location:    NPWT Type Vacuum Therapy 11/27/20 1300   Therapy Setting NPWT Intermittent therapy 11/27/20 1300   Pressure Setting NPWT 125 mmHg 11/27/20 1300   Sponges Inserted NPWT Black 11/27/20 1300   Sponges Removed NPWT Black 11/27/20 1300   General Output (mL) 0 11/27/20 1300            Incision/Site 09/08/20 1316 Left Heel (Active)   09/08/20 1316   Present Prior to Hospital Arrival?:    Side: Left   Location: Heel   Orientation:    Incision Type:    Closure Method:    Additional Comments:    Removal Indication and Assessment:    Wound Outcome:    Removal Indications:    Wound Image   11/27/20 1300   Incision WDL ex 11/27/20 1300   Dressing Appearance Dry;Intact 11/27/20 1300   Drainage Amount Small 11/27/20 1300   Drainage Characteristics/Odor Clear 11/27/20 1300   Appearance [place] 11/27/20 1300   Red (%), Wound Tissue Color 100 % 11/27/20 1300   Periwound Area Intact 11/27/20 1300   Wound Edges Defined 11/27/20 1300   Wound Length (cm) 1 cm 11/27/20 1300   Wound Width (cm) 2.5 cm 11/27/20 1300   Wound Depth (cm) 0.7 cm 11/27/20 1300   Wound Volume (cm^3) 1.75 cm^3 11/27/20 1300   Wound Surface Area (cm^2) 2.5 cm^2 11/27/20 1300   Care Cleansed with:;Antimicrobial agent;Sterile normal saline 11/27/20 1300   Dressing Changed 11/27/20 1300   Dressing Change Due 12/04/20 11/27/20 1300           Plan:                No orders of the defined types were placed in  this encounter.       No follow-ups on file.

## 2020-11-28 NOTE — PROCEDURES
Wound Debridement    Date/Time: 11/20/2020 1:00 PM  Performed by: Rosio Mayes DPM  Authorized by: Rosio Mayes DPM     Consent Done?:  Yes (Written)    Wound Details:    Location:  Left foot    Location:  Left Heel    Type of Debridement:  Excisional       Length (cm):  0.7       Area (sq cm):  1.89       Width (cm):  2.7       Percent Debrided (%):  100       Depth (cm):  0.4       Total Area Debrided (sq cm):  1.89    Depth of debridement:  Subcutaneous tissue    Tissue debrided:  Dermis    Devitalized tissue debrided:  Biofilm, Callus, Exudate and Fibrin    Instruments:  Curette and Blade    Bleeding:  Minimal  Hemostasis Achieved: Yes    Method Used:  Pressure  Patient tolerance:  Patient tolerated the procedure well with no immediate complications

## 2020-11-28 NOTE — PROCEDURES
Wound Debridement    Date/Time: 11/27/2020 1:00 PM  Performed by: Rosio Mayes DPM  Authorized by: Rosio Mayes DPM     Consent Done?:  Yes (Written)    Wound Details:    Location:  Left foot    Location:  Left Heel    Type of Debridement:  Excisional       Length (cm):  1       Area (sq cm):  2.5       Width (cm):  2.5       Percent Debrided (%):  100       Depth (cm):  0.7       Total Area Debrided (sq cm):  2.5    Depth of debridement:  Subcutaneous tissue    Tissue debrided:  Dermis    Devitalized tissue debrided:  Biofilm, Callus and Fibrin    Instruments:  Curette    Bleeding:  Minimal  Hemostasis Achieved: Yes    Method Used:  Pressure  Patient tolerance:  Patient tolerated the procedure well with no immediate complications

## 2020-12-01 ENCOUNTER — HOSPITAL ENCOUNTER (OUTPATIENT)
Dept: WOUND CARE | Facility: HOSPITAL | Age: 55
Discharge: HOME | End: 2020-12-01
Attending: FAMILY MEDICINE
Payer: MEDICARE

## 2020-12-01 VITALS
TEMPERATURE: 98 F | HEART RATE: 93 BPM | DIASTOLIC BLOOD PRESSURE: 91 MMHG | SYSTOLIC BLOOD PRESSURE: 189 MMHG | RESPIRATION RATE: 18 BRPM

## 2020-12-01 DIAGNOSIS — M86.172 ACUTE OSTEOMYELITIS OF LEFT CALCANEUS: ICD-10-CM

## 2020-12-01 DIAGNOSIS — E11.621 DIABETIC ULCER OF LEFT HEEL ASSOCIATED WITH TYPE 2 DIABETES MELLITUS, WITH BONE INVOLVEMENT WITHOUT EVIDENCE OF NECROSIS: Primary | ICD-10-CM

## 2020-12-01 DIAGNOSIS — L97.426 DIABETIC ULCER OF LEFT HEEL ASSOCIATED WITH TYPE 2 DIABETES MELLITUS, WITH BONE INVOLVEMENT WITHOUT EVIDENCE OF NECROSIS: Primary | ICD-10-CM

## 2020-12-01 LAB
POCT GLUCOSE: 102 MG/DL (ref 70–110)
POCT GLUCOSE: 191 MG/DL (ref 70–110)
POCT GLUCOSE: 86 MG/DL (ref 70–110)

## 2020-12-01 PROCEDURE — 99183 PR HYPERBARIC OXYGEN THERAPY ATTENDANCE/SUPERVISION, PER SESSION: ICD-10-PCS | Mod: ,,, | Performed by: FAMILY MEDICINE

## 2020-12-01 PROCEDURE — 82962 GLUCOSE BLOOD TEST: CPT | Performed by: FAMILY MEDICINE

## 2020-12-01 PROCEDURE — 97605 NEG PRS WND THER DME<=50SQCM: CPT

## 2020-12-01 PROCEDURE — 99183 HYPERBARIC OXYGEN THERAPY: CPT | Mod: ,,, | Performed by: FAMILY MEDICINE

## 2020-12-01 PROCEDURE — G0277 HBOT, FULL BODY CHAMBER, 30M: HCPCS | Performed by: FAMILY MEDICINE

## 2020-12-01 NOTE — PROGRESS NOTES
[facility]  [place] Lerner  [id number]  Nurse Visit    Subjective:       Patient seen in clinic today.  Dressing changed as ordered.      Assessment:          Negative Pressure Wound Therapy  11/13/20 Left (Active)   11/13/20    Side: Left   Orientation:    Location: Heel   Additional Comments:    Location:    SDO Location:    NPWT Type Vacuum Therapy 12/01/20 1500   Therapy Setting NPWT Continuous therapy 12/01/20 1500   Pressure Setting NPWT 125 mmHg 12/01/20 1500   Therapy Interventions NPWT Dressing changed;Seal intact 12/01/20 1500   Sponges Inserted NPWT Black;1 12/01/20 1500   Sponges Removed NPWT Black;1 12/01/20 1500   General Output (mL) 10 12/01/20 1500            Incision/Site 09/08/20 1316 Left Heel (Active)   09/08/20 1316   Present Prior to Hospital Arrival?:    Side: Left   Location: Heel   Orientation:    Incision Type:    Closure Method:    Additional Comments:    Removal Indication and Assessment:    Wound Outcome:    Removal Indications:    Incision WDL ex 12/01/20 1500   Dressing Appearance Intact;Moist drainage 12/01/20 1500   Drainage Amount Moderate 12/01/20 1500   Drainage Characteristics/Odor Serosanguineous 12/01/20 1500   Appearance Red;Pink;Slough;White 12/01/20 1500   Black (%), Wound Tissue Color 0 % 12/01/20 1500   Red (%), Wound Tissue Color 90 % 12/01/20 1500   Yellow (%), Wound Tissue Color 10 % 12/01/20 1500   Periwound Area Macerated 12/01/20 1500   Wound Edges Open 12/01/20 1500   Tunneling (depth (cm)/location) 0 12/01/20 1500   Undermining (depth (cm)/location) 0 12/01/20 1500   Care Soap and water;Sterile normal saline;Wound cleanser 12/01/20 1500   Dressing Applied;Other (see comments) 12/01/20 1500   Periwound Care Other (see comments) 12/01/20 1500   Off Loading Football dressing 12/01/20 1500   Dressing Change Due 12/04/20 12/01/20 1500           Plan:     No orders of the defined types were placed in this encounter.          No  follow-ups on file.

## 2020-12-01 NOTE — PROGRESS NOTES
12/01/20 1607   Hyperbaric Pre-Inspection   Mattress cleaned prior to treatment Yes   2 Patient Identifiers Verified Yes   Consent Obtained Yes   Cotton Gown Yes   Patient voided Yes   Drainage Bags Emptied Yes   Patient Pregnant No   Glasses, Jewelry, Makeup, etc. Removed Yes   Dentures, Hearing Aid, Prosthetic Devices Removed Yes   Touch hair to check for hair spray Yes   Cold/Flu Symptoms No   Diabetic Patient Eaten Yes   Medications Given No   Fears and apprehensions verbalized Yes   Ground Wire Secured Yes   HBO Treatment Course Details   Treatment Course Number 1   Ordering Provider Suman KAY   Indications Diabetic wounds of lower extremities   HBO Treatment Start Date 10/06/20   HBO Treatment Details   Treatment Number 17   Inpatient Visit No   Total Treatment Length Calc (minutes) 106   Chamber Type Monoplace   Chamber # 2   HBO Dive Log # [id number]   Treatment Protocol 2.0 ANAND x 90 minutes w/100% oxygen and no air break   Treatment Details   Dive Rate Down 2.0 psi/minute   Dive Rate Up 2.0 psi/minute   Compress Begins 1 ANAND   Clock Time 1334   Tx Pressure Reached 2 ANAND   Clock Time 1342   Decompress Begins 2 ANAND   Clock Time 1512   Decompress Ends 1 ANAND   Clock Time 1520   Pre HBO Vital Signs   BP (!) 149/67   Pulse 97  (repeat pulse after 10 mins was 93)   Resp 20   Temp 97.5 °F (36.4 °C)   Blood Glucose 191   Glucose Meter # wc   Post HBO Vital Signs   BP (!) 205/93  (repeat blood pressure after 10 min was 189/91. patient is asymptomatic.)   Pulse 93  (repeat pulse after 10 mins was 95)   Resp 18   Blood Glucose 86   Glucose Meter # wc   Action Taken Patient was shaky and related her sugar was low. gave her pepermints and rechecked glucose was 102 mg/dl. Patient related she was feeling alot better.       Arrived awake and alert. ABC's intact. Cleared for treatment by Catherine KAY Tolerated treatment without complaints. Cleared from chamber without incidents. Patients post vitals as above. Related to patient  to go to the Er if she experienced shortness of breath or chest pain. Follow up tomorrow for HBO.       ICD-10-CM ICD-9-CM   1. 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 Acute osteomyelitis of left calcaneus  M86.172 730.07        Physician Supervision  I provided direct supervision and was immediately available to furnish assistance and direction throughout the performance of the procedure.    Emergency Response Team  A trained emergency response team and emergency services were available throughout procedure.

## 2020-12-02 ENCOUNTER — HOSPITAL ENCOUNTER (OUTPATIENT)
Dept: WOUND CARE | Facility: HOSPITAL | Age: 55
Discharge: HOME | End: 2020-12-02
Attending: FAMILY MEDICINE
Payer: MEDICARE

## 2020-12-02 DIAGNOSIS — L97.426 DIABETIC ULCER OF LEFT HEEL ASSOCIATED WITH TYPE 2 DIABETES MELLITUS, WITH BONE INVOLVEMENT WITHOUT EVIDENCE OF NECROSIS: Primary | ICD-10-CM

## 2020-12-02 DIAGNOSIS — E11.621 DIABETIC ULCER OF LEFT HEEL ASSOCIATED WITH TYPE 2 DIABETES MELLITUS, WITH BONE INVOLVEMENT WITHOUT EVIDENCE OF NECROSIS: Primary | ICD-10-CM

## 2020-12-02 DIAGNOSIS — M86.172 ACUTE OSTEOMYELITIS OF LEFT CALCANEUS: ICD-10-CM

## 2020-12-02 LAB
POCT GLUCOSE: 164 MG/DL (ref 70–110)
POCT GLUCOSE: 175 MG/DL (ref 70–110)

## 2020-12-02 PROCEDURE — 99183 PR HYPERBARIC OXYGEN THERAPY ATTENDANCE/SUPERVISION, PER SESSION: ICD-10-PCS | Mod: ,,, | Performed by: FAMILY MEDICINE

## 2020-12-02 PROCEDURE — 99183 HYPERBARIC OXYGEN THERAPY: CPT | Mod: ,,, | Performed by: FAMILY MEDICINE

## 2020-12-02 PROCEDURE — G0277 HBOT, FULL BODY CHAMBER, 30M: HCPCS | Performed by: FAMILY MEDICINE

## 2020-12-02 PROCEDURE — 82962 GLUCOSE BLOOD TEST: CPT | Mod: 91 | Performed by: FAMILY MEDICINE

## 2020-12-02 NOTE — PROGRESS NOTES
12/02/20 1545   Hyperbaric Pre-Inspection   Mattress cleaned prior to treatment Yes   2 Patient Identifiers Verified Yes   Consent Obtained Yes   Cotton Gown Yes   Patient voided Yes   Drainage Bags Emptied Not applicable   Patient Pregnant No   Glasses, Jewelry, Makeup, etc. Removed Yes   Dentures, Hearing Aid, Prosthetic Devices Removed Yes   Touch hair to check for hair spray Yes   Cold/Flu Symptoms No   Diabetic Patient Eaten Yes   Medications Given No   Fears and apprehensions verbalized Yes   Ground Wire Secured Yes   HBO Treatment Course Details   Treatment Course Number 1   Ordering Provider Suman KAY   Indications Diabetic wounds of lower extremities   HBO Treatment Start Date 10/06/20   HBO Treatment Details   Treatment Number 18   Inpatient Visit No   Total Treatment Length Calc (minutes) 106   Chamber Type Monoplace   Chamber # 2   HBO Dive Log # [id number]   Treatment Protocol 2.0 ANAND x 90 minutes w/100% oxygen and no air break   Treatment Details   Dive Rate Down 2.0 psi/minute   Dive Rate Up 2.0 psi/minute   Compress Begins 1 ANAND   Clock Time 1329   Tx Pressure Reached 2 ANAND   Clock Time 1338   Decompress Begins 2 ANAND   Clock Time 1507   Decompress Ends 1 ANAND   Clock Time 1515   Pre HBO Vital Signs   /65   Pulse 76   Resp 18   Temp 97.5 °F (36.4 °C)   Blood Glucose 175   Glucose Meter # wc   Pain Level 0   Post HBO Vital Signs   BP (!) 177/77   Pulse 92   Resp 18   Blood Glucose 164   Glucose Meter # wc   Pain Level 0       Arrived awake and alert. ABC's intact. Vitals as above. Cleared for treatment by Denny KAY Tolerated treatment without complaints. Cleared from chamber without incidents. Follow up tomorrow for HBO.       ICD-10-CM ICD-9-CM   1. 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 Acute osteomyelitis of left calcaneus  M86.172 730.07        Physician Supervision  I  provided direct supervision and was immediately available to furnish assistance and direction throughout the performance of the procedure.    Emergency Response Team  A trained emergency response team and emergency services were available throughout procedure.

## 2020-12-03 ENCOUNTER — HOSPITAL ENCOUNTER (OUTPATIENT)
Dept: WOUND CARE | Facility: HOSPITAL | Age: 55
Discharge: HOME | End: 2020-12-03
Attending: INTERNAL MEDICINE
Payer: MEDICARE

## 2020-12-03 VITALS
DIASTOLIC BLOOD PRESSURE: 82 MMHG | RESPIRATION RATE: 18 BRPM | SYSTOLIC BLOOD PRESSURE: 180 MMHG | HEART RATE: 89 BPM | TEMPERATURE: 98 F

## 2020-12-03 DIAGNOSIS — E11.621 DIABETIC ULCER OF LEFT HEEL ASSOCIATED WITH TYPE 2 DIABETES MELLITUS, WITH BONE INVOLVEMENT WITHOUT EVIDENCE OF NECROSIS: Primary | ICD-10-CM

## 2020-12-03 DIAGNOSIS — L97.426 DIABETIC ULCER OF LEFT HEEL ASSOCIATED WITH TYPE 2 DIABETES MELLITUS, WITH BONE INVOLVEMENT WITHOUT EVIDENCE OF NECROSIS: Primary | ICD-10-CM

## 2020-12-03 DIAGNOSIS — M86.172 ACUTE OSTEOMYELITIS OF LEFT CALCANEUS: ICD-10-CM

## 2020-12-03 LAB
POCT GLUCOSE: 158 MG/DL (ref 70–110)
POCT GLUCOSE: 232 MG/DL (ref 70–110)

## 2020-12-03 PROCEDURE — 97605 NEG PRS WND THER DME<=50SQCM: CPT | Performed by: INTERNAL MEDICINE

## 2020-12-03 PROCEDURE — 11042 DBRDMT SUBQ TIS 1ST 20SQCM/<: CPT | Mod: ,,, | Performed by: INTERNAL MEDICINE

## 2020-12-03 PROCEDURE — 82962 GLUCOSE BLOOD TEST: CPT | Mod: 91 | Performed by: INTERNAL MEDICINE

## 2020-12-03 PROCEDURE — 11042 WOUND DEBRIDEMENT: ICD-10-PCS | Mod: ,,, | Performed by: INTERNAL MEDICINE

## 2020-12-03 PROCEDURE — 99214 OFFICE O/P EST MOD 30 MIN: CPT | Mod: 25,,, | Performed by: INTERNAL MEDICINE

## 2020-12-03 PROCEDURE — 99214 PR OFFICE/OUTPT VISIT, EST, LEVL IV, 30-39 MIN: ICD-10-PCS | Mod: 25,,, | Performed by: INTERNAL MEDICINE

## 2020-12-03 PROCEDURE — 99183 HYPERBARIC OXYGEN THERAPY: CPT | Mod: ,,, | Performed by: INTERNAL MEDICINE

## 2020-12-03 PROCEDURE — 27201912 HC WOUND CARE DEBRIDEMENT SUPPLIES: Performed by: INTERNAL MEDICINE

## 2020-12-03 PROCEDURE — 99183 PR HYPERBARIC OXYGEN THERAPY ATTENDANCE/SUPERVISION, PER SESSION: ICD-10-PCS | Mod: ,,, | Performed by: INTERNAL MEDICINE

## 2020-12-03 PROCEDURE — 11042 DBRDMT SUBQ TIS 1ST 20SQCM/<: CPT | Performed by: INTERNAL MEDICINE

## 2020-12-03 PROCEDURE — G0277 HBOT, FULL BODY CHAMBER, 30M: HCPCS | Performed by: INTERNAL MEDICINE

## 2020-12-03 NOTE — PROGRESS NOTES
[facility]                Progress Note    Subjective:       Patient ID: Suyapa Connelly is a 55 y.o. female.    Chief Complaint: Non-healing Wound Follow Up and Diabetic Foot Ulcer    12/3/2020: F/U visit. Pt ambulated to room with no assistance. No fever. No pain. Wound is improving and measuring smaller. 95% Red granular tissue with 5% slough. Macerated periwound. Continue same dressing. Discussed elevating left leg due to swelling and possible removing the wound vac.    No pain changing vac twice per week minimal drainage into canister. Tolerating HBO without difficulty did have to hold treatment twice last week due to elevated blood pressure. She is schedule tomorrow for repeat jacek's denies any claudication type pain      Review of Systems   Constitutional: Negative for activity change, appetite change, fatigue, fever and unexpected weight change.   HENT: Negative for ear pain, rhinorrhea and sore throat.    Eyes: Negative for discharge and visual disturbance.   Respiratory: Negative for chest tightness, shortness of breath and wheezing.    Cardiovascular: Negative for chest pain, palpitations and leg swelling.   Gastrointestinal: Negative for abdominal pain, constipation and diarrhea.   Endocrine: Negative for cold intolerance and heat intolerance.   Genitourinary: Negative for dysuria and hematuria.   Musculoskeletal: Negative for joint swelling and neck stiffness.   Skin: Negative for rash.   Neurological: Negative for dizziness, syncope, weakness and headaches.   Psychiatric/Behavioral: Negative for suicidal ideas.         Objective:        Physical Exam  Constitutional:       General: She is not in acute distress.     Appearance: She is obese.   HENT:      Head: Normocephalic and atraumatic.   Eyes:      General: No scleral icterus.  Pulmonary:      Effort: Pulmonary effort is normal. No respiratory distress.   Abdominal:      General: There is no distension.       Palpations: Abdomen is soft.   Skin:     Comments: Centrally heel wound shows pink granulation minimal maceration of wound perimeter without slough no tunneling to deep structures no pain with dorsiflexion of foot    Neurological:      General: No focal deficit present.      Mental Status: She is alert and oriented to person, place, and time.   Psychiatric:         Mood and Affect: Mood normal.         Behavior: Behavior normal.         Thought Content: Thought content normal.         Vitals:    12/03/20 1537   BP: (!) 180/82   Pulse: 89   Resp: 18   Temp: 97.7 °F (36.5 °C)       Assessment:           ICD-10-CM ICD-9-CM   1. Diabetic ulcer of left heel associated with type 2 diabetes mellitus, with bone involvement without evidence of necrosis  E11.621 250.80    L97.426 707.14            Negative Pressure Wound Therapy  11/13/20 Left (Active)   11/13/20    Side: Left   Orientation:    Location: Heel   Additional Comments:    Location:    SDO Location:    NPWT Type Vacuum Therapy 12/03/20 1500   Therapy Setting NPWT Continuous therapy 12/03/20 1500   Pressure Setting NPWT 125 mmHg 12/03/20 1500   Therapy Interventions NPWT Canister changed;Dressing changed;Seal intact 12/03/20 1500   Sponges Inserted NPWT Black;1 12/03/20 1500   Sponges Removed NPWT Black;1 12/03/20 1500   General Output (mL) 5 12/03/20 1500            Incision/Site 09/08/20 1316 Left Heel (Active)   09/08/20 1316   Present Prior to Hospital Arrival?:    Side: Left   Location: Heel   Orientation:    Incision Type:    Closure Method:    Additional Comments:    Removal Indication and Assessment:    Wound Outcome:    Removal Indications:    Wound Image   12/03/20 1500   Incision WDL ex 12/03/20 1500   Dressing Appearance Intact;Moist drainage 12/03/20 1500   Drainage Amount Moderate 12/03/20 1500   Drainage Characteristics/Odor Serosanguineous 12/03/20 1500   Appearance Pink;Red;White;Slough 12/03/20 1500   Black (%), Wound Tissue Color 0 %  12/03/20 1500   Red (%), Wound Tissue Color 95 % 12/03/20 1500   Yellow (%), Wound Tissue Color 5 % 12/03/20 1500   Periwound Area Macerated 12/03/20 1500   Wound Edges Open 12/03/20 1500   Wound Length (cm) 0.5 cm 12/03/20 1500   Wound Width (cm) 2.2 cm 12/03/20 1500   Wound Depth (cm) 0.5 cm 12/03/20 1500   Wound Volume (cm^3) 0.55 cm^3 12/03/20 1500   Wound Surface Area (cm^2) 1.1 cm^2 12/03/20 1500   Tunneling (depth (cm)/location) 0 12/03/20 1500   Undermining (depth (cm)/location) 0 12/03/20 1500   Care Soap and water;Sterile normal saline;Wound cleanser 12/03/20 1500   Dressing Applied;Other (see comments);Foam 12/03/20 1500   Periwound Care Other (see comments) 12/03/20 1500   Off Loading Football dressing 12/03/20 1500   Dressing Change Due 12/08/20 12/03/20 1500           Plan:          wound depth and character has significantly improved post revascularization and with three weeks of wound vac therapy. She is likely approaching maximum benefit from wound vac now that drainage has decreased but given continued improvement in wound depth will continue for additional one week for full 4 weeks fo NPWT. Hyperbarics also improving wound vascularization continue HBO therapy five days per week. To follow up next week with her treating podiatrist      Orders Placed This Encounter   Procedures    Change dressing     Gentian violet periwound. Apply benzoin/ cavilon, wound vac (black:1), aquacel foam border to anterior ankle,mextra short X1, scott foam long X2, football dressing (cast padding X3, stockinet). Suction at 125mmHG low Con.        No follow-ups on file.

## 2020-12-03 NOTE — PROGRESS NOTES
12/03/20 1300   Hyperbaric Pre-Inspection   Mattress cleaned prior to treatment Yes   2 Patient Identifiers Verified Yes   Consent Obtained Yes   Cotton Gown Yes   Patient voided Yes   Drainage Bags Emptied Not applicable   Patient Pregnant No   Glasses, Jewelry, Makeup, etc. Removed Yes   Dentures, Hearing Aid, Prosthetic Devices Removed Yes   Touch hair to check for hair spray Yes   Cold/Flu Symptoms No   Diabetic Patient Eaten Yes   Medications Given Not applicable   Fears and apprehensions verbalized Yes   Ground Wire Secured Yes   HBO Treatment Course Details   Treatment Course Number 1   Ordering Provider Suman KAY   Indications Diabetic wounds of lower extremities   HBO Treatment Start Date 10/06/20   HBO Treatment Details   Treatment Number 19   Inpatient Visit No   Total Treatment Length Calc (minutes) 106   Chamber Type Monoplace   Chamber # 2   HBO Dive Log # 565   Treatment Protocol 2.0 ANAND x 90 minutes w/100% oxygen and no air break   Treatment Details   Dive Rate Down 2.0 psi/minute   Dive Rate Up 2.0 psi/minute   Compress Begins 1 ANAND   Clock Time 1339   Tx Pressure Reached 2 ANAND   Clock Time 1347   Decompress Begins 2 ANAND   Clock Time 1517   Decompress Ends 1 ANAND   Clock Time 1525   Pre HBO Vital Signs   /65   Pulse 90   Resp 20   Temp 97.7 °F (36.5 °C)   Blood Glucose 232   Glucose Meter # wc   Pain Level 0   Post HBO Vital Signs   BP (!) 180/82   Pulse 89   Resp 18   Blood Glucose 158   Glucose Meter # wc   Pain Level 0       Arrived awake and alert. ABC's intact. Vitals as above. Cleared for treatment by Diamond KAY Tolerated treatment without complaints. Cleared from chamber without incidents. Follow up tomorrow for HBO.       ICD-10-CM ICD-9-CM   1. 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 Acute osteomyelitis of left calcaneus  M86.172 730.07      Physician  Supervision  I provided direct supervision and was immediately available to furnish assistance and direction throughout the performance of the procedure.    Emergency Response Team  A trained emergency response team and emergency services were available throughout procedure.

## 2020-12-04 NOTE — PROCEDURES
"Wound Debridement    Date/Time: 12/3/2020 3:25 PM  Performed by: Neftali Juarez MD  Authorized by: Neftali Juarez MD     Time out: Immediately prior to procedure a "time out" was called to verify the correct patient, procedure, equipment, support staff and site/side marked as required.    Consent Done?:  Yes (Verbal)  Local anesthetic:  Topical anesthetic    Wound Details:    Location:  Left foot    Location:  Left Heel    Type of Debridement:  Excisional       Length (cm):  0.5       Area (sq cm):  1.1       Width (cm):  2.2       Percent Debrided (%):  100       Depth (cm):  0.5       Total Area Debrided (sq cm):  1.1    Depth of debridement:  Subcutaneous tissue    Tissue debrided:  Subcutaneous    Devitalized tissue debrided:  Biofilm and Fibrin (non viable skin)    Instruments:  Curette    Bleeding:  Minimal  Hemostasis Achieved: Yes    Method Used:  Pressure  Patient tolerance:  Patient tolerated the procedure well with no immediate complications      "

## 2020-12-07 ENCOUNTER — HOSPITAL ENCOUNTER (OUTPATIENT)
Dept: WOUND CARE | Facility: HOSPITAL | Age: 55
Discharge: HOME | End: 2020-12-07
Attending: FAMILY MEDICINE
Payer: MEDICARE

## 2020-12-07 VITALS
SYSTOLIC BLOOD PRESSURE: 195 MMHG | HEART RATE: 87 BPM | RESPIRATION RATE: 18 BRPM | DIASTOLIC BLOOD PRESSURE: 99 MMHG | TEMPERATURE: 98 F

## 2020-12-07 DIAGNOSIS — E11.621 DIABETIC ULCER OF LEFT HEEL ASSOCIATED WITH TYPE 2 DIABETES MELLITUS, WITH BONE INVOLVEMENT WITHOUT EVIDENCE OF NECROSIS: Primary | ICD-10-CM

## 2020-12-07 DIAGNOSIS — L97.426 DIABETIC ULCER OF LEFT HEEL ASSOCIATED WITH TYPE 2 DIABETES MELLITUS, WITH BONE INVOLVEMENT WITHOUT EVIDENCE OF NECROSIS: Primary | ICD-10-CM

## 2020-12-07 DIAGNOSIS — M86.172 ACUTE OSTEOMYELITIS OF LEFT CALCANEUS: ICD-10-CM

## 2020-12-07 LAB
POCT GLUCOSE: 194 MG/DL (ref 70–110)
POCT GLUCOSE: 230 MG/DL (ref 70–110)

## 2020-12-07 PROCEDURE — 99183 PR HYPERBARIC OXYGEN THERAPY ATTENDANCE/SUPERVISION, PER SESSION: ICD-10-PCS | Mod: ,,, | Performed by: FAMILY MEDICINE

## 2020-12-07 PROCEDURE — G0277 HBOT, FULL BODY CHAMBER, 30M: HCPCS | Performed by: FAMILY MEDICINE

## 2020-12-07 PROCEDURE — 99183 HYPERBARIC OXYGEN THERAPY: CPT | Mod: ,,, | Performed by: FAMILY MEDICINE

## 2020-12-07 PROCEDURE — 97605 NEG PRS WND THER DME<=50SQCM: CPT

## 2020-12-07 PROCEDURE — 82962 GLUCOSE BLOOD TEST: CPT | Performed by: FAMILY MEDICINE

## 2020-12-07 NOTE — PROGRESS NOTES
12/07/20 1300   Hyperbaric Pre-Inspection   Mattress cleaned prior to treatment Yes   2 Patient Identifiers Verified Yes   Consent Obtained Yes   Cotton Gown Yes   Patient voided Yes   Drainage Bags Emptied Not applicable   Patient Pregnant No   Glasses, Jewelry, Makeup, etc. Removed Yes   Dentures, Hearing Aid, Prosthetic Devices Removed Yes   Touch hair to check for hair spray Yes   Cold/Flu Symptoms No   Diabetic Patient Eaten Yes   Medications Given Not applicable   Fears and apprehensions verbalized Yes   Ground Wire Secured Yes   HBO Treatment Course Details   Treatment Course Number 1   Ordering Provider Alexa   Indications Diabetic wounds of lower extremities   HBO Treatment Start Date 10/06/20   HBO Treatment Details   Treatment Number 20   Inpatient Visit No   Total Treatment Length Calc (minutes) 106   Chamber Type Monoplace   Chamber # 2   HBO Dive Log # [id number]   Treatment Protocol 2.0 ANAND x 90 minutes w/100% oxygen and no air break   Treatment Details   Dive Rate Down 2.0 psi/minute   Dive Rate Up 2.0 psi/minute   Compress Begins 1 ANAND   Clock Time 1340   Tx Pressure Reached 2 ANAND   Clock Time 1348   Decompress Begins 2 ANAND   Clock Time 1518   Decompress Ends 1 ANAND   Clock Time 1526   Pre HBO Vital Signs   /82   Pulse 95   Resp 18   Temp 97.5 °F (36.4 °C)   Blood Glucose 230   Glucose Meter # wc   Pain Level 0   Post HBO Vital Signs   BP (!) 209/98   Pulse 95   Blood Glucose 194   Glucose Meter # wc   Action Taken Repeat blood pressure after wound care beni was bp:  195/99  P: 87. She is asymptomatic and denies any complaints. she is going to check her BP as soon as she gets home and will go to er if it does not improve.   Pain Level 0       Arrived awake and alert. ABC's intact. Vitals as above. cleared for treatment by Fara KAY Tolerated treatment without complaints. Cleared from chamber without incidents. Follow up tomorrow for HBO.       ICD-10-CM ICD-9-CM   1. Diabetic ulcer of left  heel associated with type 2 diabetes mellitus, with bone involvement without evidence of necrosis  E11.621 250.80    L97.426 707.14   2. Acute osteomyelitis of left calcaneus  M86.172 730.07        Physician Supervision  I provided direct supervision and was immediately available to furnish assistance and direction throughout the performance of the procedure.    Emergency Response Team  A trained emergency response team and emergency services were available throughout procedure.

## 2020-12-07 NOTE — PROGRESS NOTES
[facility]  [place]  [id number]  Nurse Visit    Subjective:       Patient seen in clinic today.  Dressing changed as ordered.      Assessment:          Negative Pressure Wound Therapy  11/13/20 Left (Active)   11/13/20    Side: Left   Orientation:    Location: Heel   Additional Comments:    Location:    SDO Location:    NPWT Type Vacuum Therapy 12/07/20 1500   Therapy Setting NPWT Continuous therapy 12/07/20 1500   Pressure Setting NPWT 125 mmHg 12/07/20 1500   Therapy Interventions NPWT Dressing changed;Seal intact 12/07/20 1500   Sponges Inserted NPWT Black;1 12/07/20 1500   Sponges Removed NPWT Black;1 12/07/20 1500   General Output (mL) 15 12/07/20 1500            Incision/Site 09/08/20 1316 Left Heel (Active)   09/08/20 1316   Present Prior to Hospital Arrival?:    Side: Left   Location: Heel   Orientation:    Incision Type:    Closure Method:    Additional Comments:    Removal Indication and Assessment:    Wound Outcome:    Removal Indications:    Incision WDL ex 12/07/20 1500   Dressing Appearance Intact;Moist drainage 12/07/20 1500   Drainage Amount Moderate 12/07/20 1500   Drainage Characteristics/Odor Serosanguineous 12/07/20 1500   Appearance Red;Pink;Yellow;White 12/07/20 1500   Black (%), Wound Tissue Color 0 % 12/07/20 1500   Red (%), Wound Tissue Color 95 % 12/07/20 1500   Yellow (%), Wound Tissue Color 5 % 12/07/20 1500   Periwound Area Macerated 12/07/20 1500   Wound Edges Open 12/07/20 1500   Tunneling (depth (cm)/location) 0 12/07/20 1500   Undermining (depth (cm)/location) 0 12/07/20 1500   Care Soap and water;Sterile normal saline;Wound cleanser 12/07/20 1500   Dressing Applied;Other (see comments);Foam 12/07/20 1500   Periwound Care Other (see comments) 12/07/20 1500   Off Loading Football dressing 12/07/20 1500   Dressing Change Due 12/11/20 12/07/20 1500           Plan:     No orders of the defined types were placed in this encounter.          No  follow-ups on file.

## 2020-12-08 ENCOUNTER — OFFICE VISIT (OUTPATIENT)
Dept: CARDIOLOGY | Facility: CLINIC | Age: 55
End: 2020-12-08
Payer: MEDICARE

## 2020-12-08 VITALS
HEART RATE: 102 BPM | SYSTOLIC BLOOD PRESSURE: 138 MMHG | OXYGEN SATURATION: 95 % | HEIGHT: 65 IN | WEIGHT: 192.69 LBS | BODY MASS INDEX: 32.1 KG/M2 | DIASTOLIC BLOOD PRESSURE: 74 MMHG

## 2020-12-08 DIAGNOSIS — E78.2 MIXED HYPERLIPIDEMIA: ICD-10-CM

## 2020-12-08 DIAGNOSIS — R53.1 WEAKNESS: ICD-10-CM

## 2020-12-08 DIAGNOSIS — I73.9 PAD (PERIPHERAL ARTERY DISEASE): ICD-10-CM

## 2020-12-08 DIAGNOSIS — I10 ESSENTIAL HYPERTENSION: ICD-10-CM

## 2020-12-08 DIAGNOSIS — Z79.4 TYPE 2 DIABETES MELLITUS WITH HYPERGLYCEMIA, WITH LONG-TERM CURRENT USE OF INSULIN: ICD-10-CM

## 2020-12-08 DIAGNOSIS — I42.0 DILATED CARDIOMYOPATHY: ICD-10-CM

## 2020-12-08 DIAGNOSIS — F41.1 GAD (GENERALIZED ANXIETY DISORDER): ICD-10-CM

## 2020-12-08 DIAGNOSIS — I50.42 CHRONIC COMBINED SYSTOLIC AND DIASTOLIC HEART FAILURE: ICD-10-CM

## 2020-12-08 DIAGNOSIS — I73.9 PERIPHERAL ARTERY DISEASE: Primary | ICD-10-CM

## 2020-12-08 DIAGNOSIS — R06.02 SOB (SHORTNESS OF BREATH): ICD-10-CM

## 2020-12-08 DIAGNOSIS — Z79.4 TYPE 2 DIABETES MELLITUS WITHOUT COMPLICATION, WITH LONG-TERM CURRENT USE OF INSULIN: ICD-10-CM

## 2020-12-08 DIAGNOSIS — E11.9 TYPE 2 DIABETES MELLITUS WITHOUT COMPLICATION, WITH LONG-TERM CURRENT USE OF INSULIN: ICD-10-CM

## 2020-12-08 DIAGNOSIS — I73.9 PERIPHERAL VASCULAR DISEASE: ICD-10-CM

## 2020-12-08 DIAGNOSIS — E11.65 TYPE 2 DIABETES MELLITUS WITH HYPERGLYCEMIA, WITH LONG-TERM CURRENT USE OF INSULIN: ICD-10-CM

## 2020-12-08 DIAGNOSIS — I48.0 PAROXYSMAL ATRIAL FIBRILLATION: ICD-10-CM

## 2020-12-08 DIAGNOSIS — I24.9 ACUTE CORONARY SYNDROME: ICD-10-CM

## 2020-12-08 DIAGNOSIS — Z95.1 S/P CABG X 1: ICD-10-CM

## 2020-12-08 PROCEDURE — 99214 PR OFFICE/OUTPT VISIT, EST, LEVL IV, 30-39 MIN: ICD-10-PCS | Mod: S$PBB,,, | Performed by: INTERNAL MEDICINE

## 2020-12-08 PROCEDURE — 99999 PR PBB SHADOW E&M-EST. PATIENT-LVL V: ICD-10-PCS | Mod: PBBFAC,,, | Performed by: INTERNAL MEDICINE

## 2020-12-08 PROCEDURE — 93005 ELECTROCARDIOGRAM TRACING: CPT | Mod: PBBFAC | Performed by: INTERNAL MEDICINE

## 2020-12-08 PROCEDURE — 99215 OFFICE O/P EST HI 40 MIN: CPT | Mod: PBBFAC,25 | Performed by: INTERNAL MEDICINE

## 2020-12-08 PROCEDURE — 93010 EKG 12-LEAD: ICD-10-PCS | Mod: S$PBB,,, | Performed by: INTERNAL MEDICINE

## 2020-12-08 PROCEDURE — 99999 PR PBB SHADOW E&M-EST. PATIENT-LVL V: CPT | Mod: PBBFAC,,, | Performed by: INTERNAL MEDICINE

## 2020-12-08 PROCEDURE — 99214 OFFICE O/P EST MOD 30 MIN: CPT | Mod: S$PBB,,, | Performed by: INTERNAL MEDICINE

## 2020-12-08 PROCEDURE — 93010 ELECTROCARDIOGRAM REPORT: CPT | Mod: S$PBB,,, | Performed by: INTERNAL MEDICINE

## 2020-12-08 NOTE — PROGRESS NOTES
CARDIOVASCULAR CONSULTATION    REASON FOR CONSULT:   Suyapa Connelly is a 55 y.o. female who presents for follow-up after recent hospitalization.      HISTORY OF PRESENT ILLNESS:         Patient is a pleasant 54-year-old lady.  Here for follow-up after recent hospitalization.  Past medical history significant for coronary artery disease status post LIMA to LAD and PCI of circumflex, diabetes, peripheral vascular disease status post surgery and being managed by vascular surgery, dyslipidemia, hypertension.  Is here for follow-up after recent hospitalization.  Doing fine.  Denies any chest pains at rest on exertion, orthopnea, PND.  Postoperatively after her bypass surgery had brief paroxysmal atrial fibrillation has been in sinus rhythm since then.      Successful IVUS guided PCI of mid circumflex 80-85% stenosis with drug-eluting stent x1.  A 3.5 into 26 mm drug-eluting stent was implanted with excellent angiographic results and with IVUS.  SUSIE 3 flow post PCI     Coronary angiogram:     Left main:  No significant stenosis     Lad:  Severe proximal LAD stenosis.  Distal LAD gets supply from LIMA to LAD.  Lima to LAD is widely patent.  No significant stenosis noted after touchdown of LIMA to LAD.     Circumflex:  Mid circumflex 80-85% stenosis     RCA:   in the mid RCA.  Distal RCA gets supply from well-developed collaterals from the left        Access:  6 [place] right common femoral artery access.  Sheath sutured in.  Will be pulled when ACT less than 160     Assessment and plan      aspirin 81 mg daily indefinitely     Plavix 75 mg daily uninterrupted for at least 1 year.      · Low normal left ventricular systolic function. The estimated ejection fraction is 50%.  · Concentric left ventricular hypertrophy.  · Indeterminate left ventricular diastolic function.  · Normal right ventricular systolic function.  · Mild left atrial enlargement.  · Mild aortic regurgitation.  · Mild mitral regurgitation.  · Mild  tricuspid regurgitation.  · Normal central venous pressure (3 mmHg).  · The estimated PA systolic pressure is 37 mmHg.      Notes from December 2020:  Patient here.  Denies any chest pains at rest on exertion orthopnea, PND, swelling.  States that has been undergoing hyperbaric therapy.  States that when she goes into hyperbaric chamber, blood pressure rises to greater than 200 mm systolic.  At other times it stays around 130 mm systolic.  Denies any cardiac symptomatology.      PAST MEDICAL HISTORY:     Past Medical History:   Diagnosis Date    Anxiety     Depression     Diabetes mellitus     type 2    Diabetes mellitus, type 2     GERD (gastroesophageal reflux disease)     Hyperlipemia     Hypertension     Myocardial infarction 2010    minor-caused by stress per pt.    Vaginal delivery     x1       PAST SURGICAL HISTORY:     Past Surgical History:   Procedure Laterality Date    Angiogram - Right Extremity Right 7/9/15    angiogram-left leg  10/6/15    CATHETERIZATION OF BOTH LEFT AND RIGHT HEART N/A 12/18/2019    Procedure: CATHETERIZATION, HEART, BOTH LEFT AND RIGHT;  Surgeon: Que Fernando III, MD;  Location: [facility] CATH LAB;  Service: Cardiology;  Laterality: N/A;    CORONARY ANGIOGRAPHY N/A 12/18/2019    Procedure: ANGIOGRAM, CORONARY ARTERY;  Surgeon: Que Fernando III, MD;  Location: [facility] CATH LAB;  Service: Cardiology;  Laterality: N/A;    CORONARY ANGIOGRAPHY INCLUDING BYPASS GRAFTS WITH CATHETERIZATION OF LEFT HEART N/A 7/28/2020    Procedure: ANGIOGRAM, CORONARY, INCLUDING BYPASS GRAFT, WITH LEFT HEART CATHETERIZATION, 9 am;  Surgeon: Rachel Easley MD;  Location: [facility] CATH LAB;  Service: Cardiology;  Laterality: N/A;    CORONARY ARTERY BYPASS GRAFT (CABG) N/A 1/14/2020    Procedure: CORONARY ARTERY BYPASS GRAFT (CABG) x 1     Off Pump;  Surgeon: Huang Altamirano MD;  Location: [facility] OR [address] [place] [address];  Service: Cardiovascular;  Laterality: N/A;    CREATION OF FEMOROPOPLITEAL ARTERIAL  BYPASS USING GRAFT Left 8/18/2020    Procedure: CREATION, BYPASS, ARTERIAL, FEMORAL TO POPLITEAL, USING GRAFT, LEFT LOWER EXTREMITY;  Surgeon: Teddy Huber MD;  Location: [facility] OR;  Service: Vascular;  Laterality: Left;  REQUEST 7:15 A.M. START----COVID NEGATIVE ON 8/17  1ST CASE STARTE PER LEANA ON 8/7/2020 @ 942AM-LO  RN PREOP 8/12/2020   T/S-----CLEARED BY CARDS-------PENDING INSURANCE    DEBRIDEMENT OF FOOT Left 9/8/2020    Procedure: DEBRIDEMENT, FOOT;  Surgeon: Rosio Mayes DPM;  Location: [facility] OR;  Service: Podiatry;  Laterality: Left;  request neoxx .   RN Pre Op 9-4-2020, Covid negative on 9/5/20. C A    INSERTION OF TUNNELED CENTRAL VENOUS HEMODIALYSIS CATHETER N/A 1/27/2020    Procedure: Insertion, Catheter, Central Venous, Hemodialysis;  Surgeon: ESTEBAN Gomez III, MD;  Location: [facility] CATH LAB;  Service: Peripheral Vascular;  Laterality: N/A;       ALLERGIES AND MEDICATION:     Review of patient's allergies indicates:   Allergen Reactions    Ciprofloxacin      Itchiness    Contrast media      Kidney injury        Medication List          Accurate as of December 8, 2020  1:36 PM. If you have any questions, ask your nurse or doctor.            CHANGE how you take these medications    insulin glargine 100 units/mL (3mL) SubQ pen  Inject 10 Units into the skin every evening.  What changed: additional instructions        CONTINUE taking these medications    acetaminophen 500 MG tablet  Commonly known as: TYLENOL     allopurinoL 100 MG tablet  Commonly known as: ZYLOPRIM  Take 1 tablet (100 mg total) by mouth once daily.     amiodarone 200 MG Tab  Commonly known as: PACERONE  Take 1 tablet (200 mg total) by mouth once daily.     aspirin 81 MG Chew  Take 1 tablet (81 mg total) by mouth once daily.     atorvastatin 80 MG tablet  Commonly known as: LIPITOR  Take 1 tablet (80 mg total) by mouth once daily.     carvediloL 6.25 MG tablet  Commonly known as: COREG     clopidogreL 75 mg  tablet  Commonly known as: PLAVIX  Take 1 tablet (75 mg total) by mouth once daily.     escitalopram oxalate 10 MG tablet  Commonly known as: LEXAPRO  Take 1 tablet (10 mg total) by mouth once daily.     gabapentin 300 MG capsule  Commonly known as: NEURONTIN  Take 1 capsule (300 mg total) by mouth 3 (three) times daily.     hydrALAZINE 25 MG tablet  Commonly known as: APRESOLINE  Take 1 tablet (25 mg total) by mouth every 8 (eight) hours.     insulin aspart U-100 100 unit/mL injection  Commonly known as: NOVOLOG  Inject 9 Units into the skin 3 (three) times daily with meals.     metoprolol succinate 50 MG 24 hr tablet  Commonly known as: TOPROL-XL     multivitamin per tablet  Commonly known as: THERAGRAN     ondansetron 4 MG Tbdl  Commonly known as: ZOFRAN-ODT  Take 1 tablet (4 mg total) by mouth every 12 (twelve) hours as needed (vomiting).     ONETOUCH DELICA PLUS LANCET 30 gauge Misc  Generic drug: lancets  use to test blood glucose 2 (two) times daily with meals.     ONETOUCH ULTRA BLUE TEST STRIP Strp  Generic drug: blood sugar diagnostic  Use to test blood glucose twice daily     * oxyCODONE-acetaminophen 5-325 mg per tablet  Commonly known as: PERCOCET  Take 1 tablet by mouth every 6 (six) hours as needed for Pain.     * oxyCODONE-acetaminophen 5-325 mg per tablet  Commonly known as: PERCOCET  Take 1 tablet by mouth every 6 (six) hours as needed for Pain.     * oxyCODONE-acetaminophen 5-325 mg per tablet  Commonly known as: PERCOCET  Take 1 tablet by mouth every 6 (six) hours as needed for Pain.     * oxyCODONE-acetaminophen 5-325 mg per tablet  Commonly known as: PERCOCET  Take 1 tablet by mouth every 6 (six) hours as needed for Pain.     PIPERACILLIN-TAZOBACTAM 4.5G/100ML SODIUM CHLORIDE 0.9%-READY TO MIX  Inject 100 mLs (4.5 g total) into the vein every 8 (eight) hours.     SANTYL ointment  Generic drug: collagenase  Apply topically once daily.     sevelamer carbonate 800 mg Tab  Commonly known as:  RENVELA  Take 1 tablet (800 mg total) by mouth 3 (three) times daily with meals.     torsemide 20 MG Tab  Commonly known as: DEMADEX  TAKE 2 TABLETS BY MOUTH ONCE DAILY         * This list has 4 medication(s) that are the same as other medications prescribed for you. Read the directions carefully, and ask your doctor or other care provider to review them with you.                SOCIAL HISTORY:     Social History     Socioeconomic History    Marital status: Legally      Spouse name: Not on file    Number of children: Not on file    Years of education: Not on file    Highest education level: Not on file   Occupational History    Not on file   Social Needs    Financial resource strain: Not on file    Food insecurity     Worry: Not on file     Inability: Not on file    Transportation needs     Medical: Not on file     Non-medical: Not on file   Tobacco Use    Smoking status: Never Smoker    Smokeless tobacco: Never Used   Substance and Sexual Activity    Alcohol use: No    Drug use: Yes     Types: Marijuana     Comment: occassional    Sexual activity: Not on file   Lifestyle    Physical activity     Days per week: Not on file     Minutes per session: Not on file    Stress: Not on file   Relationships    Social connections     Talks on phone: Not on file     Gets together: Not on file     Attends [employer] service: Not on file     Active member of club or organization: Not on file     Attends meetings of clubs or organizations: Not on file     Relationship status: Not on file   Other Topics Concern    Not on file   Social History Narrative    Not on file       FAMILY HISTORY:     Family History   Problem Relation Age of Onset    Anesthesia problems Neg Hx        REVIEW OF SYSTEMS:   Review of Systems   Constitution: Negative.   HENT: Negative.    Eyes: Negative.    Cardiovascular: Negative.    Respiratory: Negative.    Endocrine: Negative.    Hematologic/Lymphatic: Negative.    Skin:  "Negative.    Musculoskeletal: Negative.    Gastrointestinal: Negative.    Genitourinary: Negative.    Neurological: Negative.    Psychiatric/Behavioral: Negative.    Allergic/Immunologic: Negative.        A 10 point review of systems was performed and all the pertinent positives have been mentioned. Rest of review of systems was negative.        PHYSICAL EXAM:     Vitals:    12/08/20 1323   BP: 138/74   Pulse: 102    Body mass index is 32.06 kg/m².  Weight: 87.4 kg (192 lb 10.9 oz)   Height: 5' 5" (165.1 cm)     Physical Exam   Constitutional: She is oriented to person, place, and time. She appears well-developed and well-nourished.   HENT:   Head: Normocephalic.   Eyes: Pupils are equal, round, and reactive to light.   Neck: Normal range of motion. Neck supple.   Cardiovascular: Normal rate and regular rhythm.   Pulmonary/Chest: Effort normal and breath sounds normal.   Abdominal: Soft. Normal appearance and bowel sounds are normal. There is no abdominal tenderness.   Musculoskeletal: Normal range of motion.   Neurological: She is alert and oriented to person, place, and time.   Skin: Skin is warm.   Psychiatric: She has a normal mood and affect.         DATA:     Laboratory:  CBC:  Recent Labs   Lab 08/22/20  0456 08/23/20  0556 08/24/20  0524   WBC 8.35 7.33 8.09   Hemoglobin 7.2 L 7.4 L 7.4 L   Hematocrit 23.1 L 23.8 L 23.5 L   Platelets 338 379 H 399 H       CHEMISTRIES:  Recent Labs   Lab 08/22/20  0456 08/23/20  0556 08/24/20  0524   Glucose 164 H 167 H 161 H   Sodium 137 137 138   Potassium 3.2 L 3.2 L 3.4 L   BUN 24 H 21 H 18   Creatinine 1.4 1.5 H 1.5 H   eGFR if  49 A 46 A 46 A   eGFR if non  43 A 39 A 39 A   Calcium 8.7 8.3 L 8.5 L   Magnesium 1.7 1.7 1.7       CARDIAC BIOMARKERS:  Recent Labs   Lab 06/01/18  1045  01/02/20  1701  01/04/20  1004 01/04/20  1510 01/06/20  2218 07/21/20  1151     --  149  --   --   --   --  78   Troponin I  --    < > 0.162 H   < > " 0.188 H 0.169 H 0.030 H  --     < > = values in this interval not displayed.       COAGS:  Recent Labs   Lab 08/22/20  0456 08/23/20  0556 08/24/20  0524   INR 0.9 0.9 0.9       LIPIDS/LFTS:  Recent Labs   Lab 12/16/19  0803  01/03/20  0401  03/23/20  2143 07/17/20  1631 08/12/20  1731   Cholesterol 244 H  --  246 H  --   --   --   --    Triglycerides 135  --  115  --   --   --   --    HDL 46  --  41  --   --   --   --    LDL Cholesterol 171.0 H  --  182.0 H  --   --   --   --    Non-HDL Cholesterol 198  --  205  --   --   --   --    AST  --    < > 10   < > 23 11 16   ALT  --    < > 5 L   < > 16 <5 L 10    < > = values in this interval not displayed.       Hemoglobin A1C   Date Value Ref Range Status   08/26/2020 10.2 (H) 4.0 - 5.6 % Final     Comment:     ADA Screening Guidelines:  5.7-6.4%  Consistent with prediabetes  >or=6.5%  Consistent with diabetes  High levels of fetal hemoglobin interfere with the HbA1C  assay. Heterozygous hemoglobin variants (HbS, HgC, etc)do  not significantly interfere with this assay.   However, presence of multiple variants may affect accuracy.     07/17/2020 >14.0 (H) 4.0 - 5.6 % Final     Comment:     ADA Screening Guidelines:  5.7-6.4%  Consistent with prediabetes  >or=6.5%  Consistent with diabetes  High levels of fetal hemoglobin interfere with the HbA1C  assay. Heterozygous hemoglobin variants (HbS, HgC, etc)do  not significantly interfere with this assay.   However, presence of multiple variants may affect accuracy.     01/03/2020 9.7 (H) 4.0 - 5.6 % Final     Comment:     ADA Screening Guidelines:  5.7-6.4%  Consistent with prediabetes  >or=6.5%  Consistent with diabetes  High levels of fetal hemoglobin interfere with the HbA1C  assay. Heterozygous hemoglobin variants (HbS, HgC, etc)do  not significantly interfere with this assay.   However, presence of multiple variants may affect accuracy.         TSH  Recent Labs   Lab 12/07/19  0132 01/02/20  1701   TSH 1.705 0.562        The ASCVD Risk score ([place] SUSAN Jr., et al., 2013) failed to calculate for the following reasons:    The patient has a prior MI or stroke diagnosis             ASSESSMENT AND PLAN     Patient Active Problem List   Diagnosis    Peripheral artery disease    Peripheral vascular disease    PAD (peripheral artery disease)    Hypokalemia, gastrointestinal losses    Type 2 diabetes mellitus without complication, with long-term current use of insulin    Panic attack as reaction to stress    Malnutrition of moderate degree    Essential hypertension    Vitamin D deficiency    CAROLIN (generalized anxiety disorder)    MDD (major depressive disorder), recurrent episode, moderate    Bereavement    Injury to kidney    Chronic combined systolic and diastolic heart failure    Mixed hyperlipidemia    Dilated cardiomyopathy    Acute coronary syndrome    Acute on chronic combined systolic (congestive) and diastolic (congestive) heart failure    Type 2 diabetes mellitus with hyperglycemia, with long-term current use of insulin    Coronary artery disease involving native coronary artery of native heart without angina pectoris    Contrast dye induced nephropathy    Weakness    S/P CABG x 1    Acute blood loss anemia    Paroxysmal atrial fibrillation    Alteration in skin integrity in adult    Preventive measure    Ischemic cardiomyopathy    Non healing left heel wound    Acute osteomyelitis of left calcaneus    Stage 3 chronic kidney disease    Critical lower limb ischemia    S/P femoral-popliteal bypass surgery    Diabetic ulcer of left heel associated with type 2 diabetes mellitus, with bone involvement without evidence of necrosis    Ischemia of lower extremity           Coronary artery disease status post LIMA to LAD and PCI of circumflex.  July 29, 2020:  Patient underwent PCI of circumflex.  Doing fine.  No complications.  Continue aspirin 81 mg daily indefinitely and Plavix 75 mg daily  uninterrupted for at least 1 year and longer if no contraindications.  the RCA  will be medically managed    Stage 3 chronic kidney disease      S/P CABG x 1  Status post LIMA to LAD.  Moderate disease in OM2.  Status post PCI of circumflex    Chronic combined systolic and diastolic heart failure  Currently euvolemic.  EF 50%.    Essential hypertension  Well controlled on current therapy.  Continue antihypertensives and titrate as needed.  Her blood pressure states normal most of the time, however gets significantly elevated in the hyperbaric chamber.  I have asked her to take hydralazine 50 mg S in the afternoon does instead of the 25 mg prior to her going into the hyperbaric chamber.    Type 2 diabetes mellitus without complication, with long-term current use of insulin  Management per primary    Peripheral artery disease  Being managed by vascular surgery.  Status post left fem-pop bypass    Postoperative paroxysmal AFib after CABG.  EKG done today in the clinic demonstrates sinus tachycardia with nonspecific ST changes.  May discontinue amiodarone.          Thank you very much for involving me in the care of your patient.  Please do not hesitate to contact me if there are any questions.      Rachel Easley MD, FACC, [facility]  Interventional Cardiologist, [facility].       Follow-up in 3 months    This note was dictated with the help of speech recognition software.  There might be un-intended errors and/or substitutions.

## 2020-12-10 ENCOUNTER — HOSPITAL ENCOUNTER (OUTPATIENT)
Dept: WOUND CARE | Facility: HOSPITAL | Age: 55
Discharge: HOME | End: 2020-12-10
Attending: FAMILY MEDICINE
Payer: MEDICARE

## 2020-12-10 DIAGNOSIS — L97.426 DIABETIC ULCER OF LEFT HEEL ASSOCIATED WITH TYPE 2 DIABETES MELLITUS, WITH BONE INVOLVEMENT WITHOUT EVIDENCE OF NECROSIS: Primary | ICD-10-CM

## 2020-12-10 DIAGNOSIS — M86.172 ACUTE OSTEOMYELITIS OF LEFT CALCANEUS: ICD-10-CM

## 2020-12-10 DIAGNOSIS — E11.621 DIABETIC ULCER OF LEFT HEEL ASSOCIATED WITH TYPE 2 DIABETES MELLITUS, WITH BONE INVOLVEMENT WITHOUT EVIDENCE OF NECROSIS: Primary | ICD-10-CM

## 2020-12-10 LAB
POCT GLUCOSE: 147 MG/DL (ref 70–110)
POCT GLUCOSE: 228 MG/DL (ref 70–110)

## 2020-12-10 PROCEDURE — G0277 HBOT, FULL BODY CHAMBER, 30M: HCPCS | Performed by: FAMILY MEDICINE

## 2020-12-10 PROCEDURE — 82962 GLUCOSE BLOOD TEST: CPT | Mod: 91 | Performed by: FAMILY MEDICINE

## 2020-12-10 PROCEDURE — 99183 HYPERBARIC OXYGEN THERAPY: CPT | Mod: ,,, | Performed by: FAMILY MEDICINE

## 2020-12-10 PROCEDURE — 99183 PR HYPERBARIC OXYGEN THERAPY ATTENDANCE/SUPERVISION, PER SESSION: ICD-10-PCS | Mod: ,,, | Performed by: FAMILY MEDICINE

## 2020-12-10 NOTE — PROGRESS NOTES
12/10/20 1300   Hyperbaric Pre-Inspection   Mattress cleaned prior to treatment Yes   2 Patient Identifiers Verified Yes   Consent Obtained Yes   Cotton Gown Yes   Patient voided Yes   Drainage Bags Emptied Not applicable   Patient Pregnant No   Glasses, Jewelry, Makeup, etc. Removed Yes   Dentures, Hearing Aid, Prosthetic Devices Removed Yes   Touch hair to check for hair spray Yes   Cold/Flu Symptoms No   Diabetic Patient Eaten Yes   Medications Given Not applicable   Fears and apprehensions verbalized Yes   Ground Wire Secured Yes   HBO Treatment Course Details   Treatment Course Number 1   Ordering Provider Suman KAY   Indications Diabetic wounds of lower extremities   HBO Treatment Start Date 10/06/20   HBO Treatment Details   Treatment Number 21   Inpatient Visit No   Total Treatment Length Calc (minutes) 106   Chamber Type Monoplace   Chamber # 2   HBO Dive Log # [id number]   Treatment Protocol 2.0 ANAND x 90 minutes w/100% oxygen and no air break   Treatment Details   Dive Rate Down 2.0 psi/minute   Dive Rate Up 2.0 psi/minute   Compress Begins 1 ANAND   Clock Time 1333   Tx Pressure Reached 2 ANAND   Clock Time 1341   Decompress Begins 2 ANAND   Clock Time 1511   Decompress Ends 1 ANAND   Clock Time 1519   Pre HBO Vital Signs   BP (!) 149/73   Pulse 94   Resp 18   Temp 97.7 °F (36.5 °C)   Pain Level 0   Post HBO Vital Signs   BP (!) 183/81   Pulse 94   Resp 18   Pain Level 0       Arrived awake and alert. ABC's intact. Vitals as above. Cleared for treatment by Catherine KAY Tolerated treatment without complaints. Cleared from chamber without incidents. Follow up tomorrow for HBO.       ICD-10-CM ICD-9-CM   1. 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 Acute osteomyelitis of left calcaneus  M86.172 730.07        Physician Supervision  I provided direct supervision and was immediately available to furnish  assistance and direction throughout the performance of the procedure.    Emergency Response Team  A trained emergency response team and emergency services were available throughout procedure.

## 2020-12-11 ENCOUNTER — HOSPITAL ENCOUNTER (OUTPATIENT)
Dept: WOUND CARE | Facility: HOSPITAL | Age: 55
Discharge: HOME | End: 2020-12-11
Attending: PODIATRIST
Payer: MEDICARE

## 2020-12-11 VITALS
SYSTOLIC BLOOD PRESSURE: 142 MMHG | DIASTOLIC BLOOD PRESSURE: 65 MMHG | RESPIRATION RATE: 17 BRPM | TEMPERATURE: 97 F | HEART RATE: 89 BPM

## 2020-12-11 DIAGNOSIS — L97.426 DIABETIC ULCER OF LEFT HEEL ASSOCIATED WITH TYPE 2 DIABETES MELLITUS, WITH BONE INVOLVEMENT WITHOUT EVIDENCE OF NECROSIS: Primary | ICD-10-CM

## 2020-12-11 DIAGNOSIS — E11.621 DIABETIC ULCER OF LEFT HEEL ASSOCIATED WITH TYPE 2 DIABETES MELLITUS, WITH BONE INVOLVEMENT WITHOUT EVIDENCE OF NECROSIS: Primary | ICD-10-CM

## 2020-12-11 PROCEDURE — 11042 DBRDMT SUBQ TIS 1ST 20SQCM/<: CPT | Mod: ,,, | Performed by: PODIATRIST

## 2020-12-11 PROCEDURE — 11042 WOUND DEBRIDEMENT: ICD-10-PCS | Mod: ,,, | Performed by: PODIATRIST

## 2020-12-11 PROCEDURE — 11042 DBRDMT SUBQ TIS 1ST 20SQCM/<: CPT | Performed by: PODIATRIST

## 2020-12-11 PROCEDURE — 27201912 HC WOUND CARE DEBRIDEMENT SUPPLIES: Performed by: PODIATRIST

## 2020-12-11 PROCEDURE — 99499 NO LOS: ICD-10-PCS | Mod: ,,, | Performed by: PODIATRIST

## 2020-12-11 PROCEDURE — 99499 UNLISTED E&M SERVICE: CPT | Mod: ,,, | Performed by: PODIATRIST

## 2020-12-11 NOTE — PROGRESS NOTES
[facility]                Progress Note    Subjective:       Patient ID: Suyapa Connelly is a 55 y.o. female.    Chief Complaint: Non-healing Wound Follow Up and Diabetic Foot Ulcer    12/11/2020: F/U visit. Pt ambulated to exam room. No fever. Denies pain.  Blood pressure 142/65 to the right arm. Wound is improving and measuring smaller. Red granular tissue with macerated periwound. Held wound vac for one week. Applied endoform/ HB transfer backed with high absorbant foams. Changed outer dressing to coflex TLC. Discussed elevating as much as possible.      Review of Systems   Constitutional: Negative.    HENT: Negative.    Respiratory: Negative.    Cardiovascular: Positive for leg swelling.   Gastrointestinal: Negative.    Genitourinary: Negative.    Musculoskeletal: Positive for arthralgias.   Skin: Positive for wound.   Neurological: Negative.    Psychiatric/Behavioral: Negative.          Objective:        Physical Exam  Constitutional:       Appearance: She is well-developed.      Comments: Oriented to time, place, and person.   Cardiovascular:      Comments: DP and PT pulses are palpable bilaterally. 3 sec capillary refill time and toes and feet are warm to touch proximally .  There is  hair growth on the feet and toes b/l. There is no edema b/l. No spider veins or varicosities present b/l.     Musculoskeletal:      Comments: Equinus noted b/l ankles with < 10 deg DF noted. MMT 5/5 in DF/PF/Inv/Ev resistance with no reproduction of pain in any direction. Passive range of motion of ankle and pedal joints is painless b/l.     Feet:      Right foot:      Skin integrity: No callus or dry skin.      Left foot:      Skin integrity: No callus or dry skin.   Lymphadenopathy:      Comments: Negative lymphadenopathy bilateral popliteal fossa and tarsal tunnel.   Skin:     Comments: See wound description      Neurological:      Mental Status: She is alert.      Comments:  Light touch, proprioception, and sharp/dull sensation are all intact bilaterally. Protective threshold with the [place]-Wienstein monofilament is intact bilaterally.    Psychiatric:         Behavior: Behavior is cooperative.           Vitals:    12/11/20 1338   BP: (!) 142/65   Pulse: 89   Resp: 17   Temp: 97.3 °F (36.3 °C)       Assessment:           ICD-10-CM ICD-9-CM   1. Diabetic ulcer of left heel associated with type 2 diabetes mellitus, with bone involvement without evidence of necrosis  E11.621 250.80    L97.426 707.14            Negative Pressure Wound Therapy  11/13/20 Left (Active)   11/13/20    Side: Left   Orientation:    Location: Heel   Additional Comments:    Location:    SDO Location:    NPWT Type Vacuum Therapy 12/11/20 1300   Therapy Setting NPWT Continuous therapy 12/11/20 1300   Pressure Setting NPWT 125 mmHg 12/11/20 1300   Therapy Interventions NPWT Dressing changed;Seal intact 12/11/20 1300   Sponges Removed NPWT Black;1 12/11/20 1300   General Output (mL) 15 12/11/20 1300            Incision/Site 09/08/20 1316 Left Heel (Active)   09/08/20 1316   Present Prior to Hospital Arrival?:    Side: Left   Location: Heel   Orientation:    Incision Type:    Closure Method:    Additional Comments:    Removal Indication and Assessment:    Wound Outcome:    Removal Indications:    Wound Image   12/11/20 1300   Incision WDL ex 12/11/20 1300   Dressing Appearance Intact;Moist drainage 12/11/20 1300   Drainage Amount Moderate 12/11/20 1300   Drainage Characteristics/Odor Serosanguineous 12/11/20 1300   Appearance Pink;Red;White;Yellow 12/11/20 1300   Black (%), Wound Tissue Color 0 % 12/11/20 1300   Red (%), Wound Tissue Color 95 % 12/11/20 1300   Yellow (%), Wound Tissue Color 5 % 12/11/20 1300   Periwound Area Macerated 12/11/20 1300   Wound Edges Open 12/11/20 1300   Wound Length (cm) 0.7 cm 12/11/20 1300   Wound Width (cm) 2 cm 12/11/20 1300   Wound Depth (cm) 0.4 cm 12/11/20 1300   Wound Volume (cm^3)  "0.56 cm^3 12/11/20 1300   Wound Surface Area (cm^2) 1.4 cm^2 12/11/20 1300   Tunneling (depth (cm)/location) 0 12/11/20 1300   Undermining (depth (cm)/location) 0 12/11/20 1300   Care Soap and water;Sterile normal saline;Wound cleanser 12/11/20 1300   Dressing Applied;Other (see comments);Collagen 12/11/20 1300   Periwound Care Other (see comments) 12/11/20 1300   Compression Two layer compression 12/11/20 1300   Off Loading Football dressing 12/11/20 1300   Dressing Change Due 12/15/20 12/11/20 1300           Plan:              Greater than 50% of this visit spent on counseling and coordination of care.    Education about the prevention of limb loss.    Discussed wound healing cycle, skin integrity, ways to care for skin.Counseled patient on the effects of blood glucose on healing. She verbalizes understanding that it can increase the chances of delayed healing and this prolonged exposure leads to infection or progression of infection which subsequently can result in loss of limb.    The wound is cleansed of foreign material as much as possible and the base inspected for bone or abscess. Base is granular     Debridement: procedure note at end    Short-term goals include maintaining good offloading and minimizing bioburden, promoting granulation and epithelialization to healing.  Long-term goals include keeping the wound healed by good offloading and medical management under the direction of internist.      Wound Debridement    Date/Time: 12/11/2020 1:20 PM  Performed by: Magnolia Newton DPM  Authorized by: Magnolia Newton DPM     Time out: Immediately prior to procedure a "time out" was called to verify the correct patient, procedure, equipment, support staff and site/side marked as required.    Consent Done?:  Yes (Written)    Preparation: Patient was prepped and draped in usual sterile fashion      Wound Details:    Location:  Left foot    Location:  Left Heel    Type of Debridement:  Excisional       Length (cm):  " 0.7       Area (sq cm):  0.14       Width (cm):  0.2       Percent Debrided (%):  100       Depth (cm):  0.4       Total Area Debrided (sq cm):  0.14    Depth of debridement:  Subcutaneous tissue    Tissue debrided:  Dermis, Epidermis and Subcutaneous    Devitalized tissue debrided:  Callus, Fibrin and Biofilm    Instruments:  Curette    Bleeding:  Minimal  Hemostasis Achieved: Yes    Method Used:  Pressure  Patient tolerance:  Patient tolerated the procedure well with no immediate complications        Orders Placed This Encounter   Procedures    Change dressing     Gentian violet periwound, endoform to wound bed, HB transfer cut to fit, double layer drawtex, mextra short X1, scott foam long X3 perpendicular, football dressing (cast padding X3), coflex TLC.        Follow up in about 4 days (around 12/15/2020) for wound care.

## 2020-12-14 ENCOUNTER — HOSPITAL ENCOUNTER (OUTPATIENT)
Dept: WOUND CARE | Facility: HOSPITAL | Age: 55
Discharge: HOME | End: 2020-12-14
Attending: FAMILY MEDICINE
Payer: MEDICARE

## 2020-12-14 DIAGNOSIS — L97.426 DIABETIC ULCER OF LEFT HEEL ASSOCIATED WITH TYPE 2 DIABETES MELLITUS, WITH BONE INVOLVEMENT WITHOUT EVIDENCE OF NECROSIS: Primary | ICD-10-CM

## 2020-12-14 DIAGNOSIS — E11.621 DIABETIC ULCER OF LEFT HEEL ASSOCIATED WITH TYPE 2 DIABETES MELLITUS, WITH BONE INVOLVEMENT WITHOUT EVIDENCE OF NECROSIS: Primary | ICD-10-CM

## 2020-12-14 DIAGNOSIS — M86.172 ACUTE OSTEOMYELITIS OF LEFT CALCANEUS: ICD-10-CM

## 2020-12-14 LAB
POCT GLUCOSE: 120 MG/DL (ref 70–110)
POCT GLUCOSE: 194 MG/DL (ref 70–110)

## 2020-12-14 PROCEDURE — 82962 GLUCOSE BLOOD TEST: CPT | Mod: 91 | Performed by: FAMILY MEDICINE

## 2020-12-14 PROCEDURE — G0277 HBOT, FULL BODY CHAMBER, 30M: HCPCS | Performed by: FAMILY MEDICINE

## 2020-12-14 NOTE — PROGRESS NOTES
12/14/20 1300   Hyperbaric Pre-Inspection   Mattress cleaned prior to treatment Yes   2 Patient Identifiers Verified Yes   Consent Obtained Yes   Cotton Gown Yes   Patient voided Yes   Drainage Bags Emptied Not applicable   Patient Pregnant No   Glasses, Jewelry, Makeup, etc. Removed Yes   Dentures, Hearing Aid, Prosthetic Devices Removed Yes   Touch hair to check for hair spray Yes   Cold/Flu Symptoms No   Diabetic Patient Eaten Yes   Medications Given Not applicable   Fears and apprehensions verbalized Yes   Ground Wire Secured Yes   HBO Treatment Course Details   Treatment Course Number 1   Ordering Provider Suman KAY   Indications Diabetic wounds of lower extremities   HBO Treatment Start Date 10/06/20   HBO Treatment Details   Treatment Number 22   Inpatient Visit Yes   Total Treatment Length Calc (minutes) -14   Chamber Type Monoplace   Chamber # 2   HBO Dive Log # 574   Treatment Protocol 2.0 ANAND x 90 minutes w/100% oxygen and no air break   Treatment Details   Dive Rate Down 2.0 psi/minute   Dive Rate Up 2.0 psi/minute   Compress Begins 1 ANAND   Clock Time 1352   Tx Pressure Reached 2 ANAND   Clock Time 1400   Decompress Begins 2 ANAND   Clock Time 1530   Decompress Ends 1 ANAND   Clock Time 1338   Pre HBO Vital Signs   BP (!) 192/88   Pulse 94   Resp 20   Temp 97.5 °F (36.4 °C)   Blood Glucose 194   Glucose Meter # wc   Pain Level 0   Post HBO Vital Signs   BP (!) 173/81   Pulse 88   Resp 18   Blood Glucose 120   Glucose Meter # wc   Pain Level 0       Arrived awake and alert. ABC's intact. Vitals as above. Cleared for treatment by Catherine KAY Tolerated treatment without complaints. Cleared from chamber without incidents. Follow up tomorrow for HBO.       ICD-10-CM ICD-9-CM   1. 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 Acute osteomyelitis of left calcaneus  M86.172 730.07        Physician  Supervision  I provided direct supervision and was immediately available to furnish assistance and direction throughout the performance of the procedure.    Emergency Response Team  A trained emergency response team and emergency services were available throughout procedure.

## 2020-12-15 ENCOUNTER — HOSPITAL ENCOUNTER (OUTPATIENT)
Dept: WOUND CARE | Facility: HOSPITAL | Age: 55
Discharge: HOME | End: 2020-12-15
Attending: FAMILY MEDICINE
Payer: MEDICARE

## 2020-12-15 VITALS
SYSTOLIC BLOOD PRESSURE: 175 MMHG | HEART RATE: 86 BPM | SYSTOLIC BLOOD PRESSURE: 186 MMHG | TEMPERATURE: 97 F | DIASTOLIC BLOOD PRESSURE: 76 MMHG | RESPIRATION RATE: 20 BRPM | TEMPERATURE: 97 F | RESPIRATION RATE: 20 BRPM | DIASTOLIC BLOOD PRESSURE: 81 MMHG | HEART RATE: 94 BPM

## 2020-12-15 DIAGNOSIS — L97.426 DIABETIC ULCER OF LEFT HEEL ASSOCIATED WITH TYPE 2 DIABETES MELLITUS, WITH BONE INVOLVEMENT WITHOUT EVIDENCE OF NECROSIS: Primary | ICD-10-CM

## 2020-12-15 DIAGNOSIS — E11.621 DIABETIC ULCER OF LEFT HEEL ASSOCIATED WITH TYPE 2 DIABETES MELLITUS, WITH BONE INVOLVEMENT WITHOUT EVIDENCE OF NECROSIS: Primary | ICD-10-CM

## 2020-12-15 DIAGNOSIS — M86.172 ACUTE OSTEOMYELITIS OF LEFT CALCANEUS: ICD-10-CM

## 2020-12-15 LAB — POCT GLUCOSE: 224 MG/DL (ref 70–110)

## 2020-12-15 PROCEDURE — 29581 APPL MULTLAYER CMPRN SYS LEG: CPT

## 2020-12-15 NOTE — PROGRESS NOTES
Arrived awake and alert. ABC's intact. Vitals as above. Accucheck was 224 mg. Pt's blood pressure was too high to treat today. M.D. notified. Pt to return tomorrow for follow up HBO.  Follow up ^^^^^^^^ for HBO.      ICD-10-CM ICD-9-CM   1. 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 Acute osteomyelitis of left calcaneus  M86.172 730.07

## 2020-12-15 NOTE — PROGRESS NOTES
[facility]  [address]  [id number]  Nurse Visit    Subjective:       Patient seen in clinic today.  Dressing changed as ordered.      Assessment:          Incision/Site 09/08/20 1316 Left Heel (Active)   09/08/20 1316   Present Prior to Hospital Arrival?:    Side: Left   Location: Heel   Orientation:    Incision Type:    Closure Method:    Additional Comments:    Removal Indication and Assessment:    Wound Outcome:    Removal Indications:    Incision WDL ex 12/15/20 1400   Dressing Appearance Intact;Moist drainage 12/15/20 1400   Drainage Amount Moderate 12/15/20 1400   Drainage Characteristics/Odor Serosanguineous 12/15/20 1400   Appearance Pink;Red;Yellow;White 12/15/20 1400   Black (%), Wound Tissue Color 0 % 12/15/20 1400   Red (%), Wound Tissue Color 95 % 12/15/20 1400   Yellow (%), Wound Tissue Color 5 % 12/15/20 1400   Periwound Area Macerated 12/15/20 1400   Wound Edges Open 12/15/20 1400   Tunneling (depth (cm)/location) 0 12/15/20 1400   Undermining (depth (cm)/location) 0 12/15/20 1400   Care Soap and water;Sterile normal saline;Wound cleanser 12/15/20 1400   Dressing Applied;Collagen;Other (see comments);Foam 12/15/20 1400   Periwound Care Other (see comments) 12/15/20 1400   Compression Two layer compression 12/15/20 1400   Off Loading Football dressing 12/15/20 1400   Dressing Change Due 12/18/20 12/15/20 1400           Plan:     No orders of the defined types were placed in this encounter.          No follow-ups on file.

## 2020-12-16 ENCOUNTER — HOSPITAL ENCOUNTER (OUTPATIENT)
Dept: WOUND CARE | Facility: HOSPITAL | Age: 55
Discharge: HOME | End: 2020-12-16
Attending: FAMILY MEDICINE
Payer: MEDICARE

## 2020-12-16 DIAGNOSIS — E11.621 DIABETIC ULCER OF LEFT HEEL ASSOCIATED WITH TYPE 2 DIABETES MELLITUS, WITH BONE INVOLVEMENT WITHOUT EVIDENCE OF NECROSIS: Primary | ICD-10-CM

## 2020-12-16 DIAGNOSIS — M86.172 ACUTE OSTEOMYELITIS OF LEFT CALCANEUS: ICD-10-CM

## 2020-12-16 DIAGNOSIS — L97.426 DIABETIC ULCER OF LEFT HEEL ASSOCIATED WITH TYPE 2 DIABETES MELLITUS, WITH BONE INVOLVEMENT WITHOUT EVIDENCE OF NECROSIS: Primary | ICD-10-CM

## 2020-12-16 LAB
POCT GLUCOSE: 158 MG/DL (ref 70–110)
POCT GLUCOSE: 221 MG/DL (ref 70–110)

## 2020-12-16 PROCEDURE — 82962 GLUCOSE BLOOD TEST: CPT | Performed by: FAMILY MEDICINE

## 2020-12-16 PROCEDURE — G0277 HBOT, FULL BODY CHAMBER, 30M: HCPCS | Performed by: FAMILY MEDICINE

## 2020-12-16 PROCEDURE — 99183 PR HYPERBARIC OXYGEN THERAPY ATTENDANCE/SUPERVISION, PER SESSION: ICD-10-PCS | Mod: ,,, | Performed by: FAMILY MEDICINE

## 2020-12-16 PROCEDURE — 99183 HYPERBARIC OXYGEN THERAPY: CPT | Mod: ,,, | Performed by: FAMILY MEDICINE

## 2020-12-16 NOTE — PROGRESS NOTES
12/16/20 1300   Hyperbaric Pre-Inspection   Mattress cleaned prior to treatment Yes   2 Patient Identifiers Verified Yes   Consent Obtained Yes   Cotton Gown Yes   Drainage Bags Emptied Not applicable   Patient Pregnant No   Glasses, Jewelry, Makeup, etc. Removed Yes   Dentures, Hearing Aid, Prosthetic Devices Removed Yes   Touch hair to check for hair spray Yes   Cold/Flu Symptoms No   Diabetic Patient Eaten Yes   Medications Given Not applicable   Fears and apprehensions verbalized Yes   Ground Wire Secured Yes   HBO Treatment Course Details   Treatment Course Number 1   Ordering Provider Suman WRIGHT   Indications Diabetic wounds of lower extremities   HBO Treatment Start Date 10/06/20   HBO Treatment Details   Treatment Number 23   Inpatient Visit No   Total Treatment Length Calc (minutes) 87   Chamber Type Monoplace   Chamber # 2   HBO Dive Log # 577   Treatment Protocol 2.0 ANAND x 90 minutes w/100% oxygen and no air break   Treatment Details   Dive Rate Down 2.0 psi/minute   Dive Rate Up 2.0 psi/minute   Compress Begins 1 ANAND   Clock Time 1401   Tx Pressure Reached 2 ANAND   Clock Time 1409   Decompress Begins 2 ANAND   Clock Time 1520   Decompress Ends 1 ANAND   Clock Time 1528   Pre HBO Vital Signs   BP (!) 162/74   Pulse 92   Resp 18   Temp 97.7 °F (36.5 °C)   Blood Glucose 221   Glucose Meter # wc   Action Taken after 20 mins pts blood pressure is 158 79.   Pain Level 0   Post HBO Vital Signs   BP (!) 192/88   Pulse 95   Resp 18   Blood Glucose 158   Pain Level 0       Arrived awake and alert. ABC's intact. Cleared for treatment by Suman KAY Tolerated treatment without complaints. Cleared from chamber without incidents. Follow up Tomorrow for HBO.       ICD-10-CM ICD-9-CM   1. 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 Acute osteomyelitis of left calcaneus  M86.172 730.07        Physician  Supervision  I provided direct supervision and was immediately available to furnish assistance and direction throughout the performance of the procedure.    Emergency Response Team  A trained emergency response team and emergency services were available throughout procedure.

## 2020-12-18 ENCOUNTER — HOSPITAL ENCOUNTER (OUTPATIENT)
Dept: WOUND CARE | Facility: HOSPITAL | Age: 55
Discharge: HOME | End: 2020-12-18
Attending: PODIATRIST
Payer: MEDICARE

## 2020-12-18 VITALS
TEMPERATURE: 97 F | SYSTOLIC BLOOD PRESSURE: 124 MMHG | DIASTOLIC BLOOD PRESSURE: 59 MMHG | RESPIRATION RATE: 17 BRPM | HEART RATE: 94 BPM

## 2020-12-18 DIAGNOSIS — L97.426 DIABETIC ULCER OF LEFT HEEL ASSOCIATED WITH TYPE 2 DIABETES MELLITUS, WITH BONE INVOLVEMENT WITHOUT EVIDENCE OF NECROSIS: Primary | ICD-10-CM

## 2020-12-18 DIAGNOSIS — E11.621 DIABETIC ULCER OF LEFT HEEL ASSOCIATED WITH TYPE 2 DIABETES MELLITUS, WITH BONE INVOLVEMENT WITHOUT EVIDENCE OF NECROSIS: Primary | ICD-10-CM

## 2020-12-18 PROCEDURE — 11042 DBRDMT SUBQ TIS 1ST 20SQCM/<: CPT | Mod: ,,, | Performed by: PODIATRIST

## 2020-12-18 PROCEDURE — 11042 WOUND DEBRIDEMENT: ICD-10-PCS | Mod: ,,, | Performed by: PODIATRIST

## 2020-12-18 PROCEDURE — 27201912 HC WOUND CARE DEBRIDEMENT SUPPLIES: Performed by: PODIATRIST

## 2020-12-18 PROCEDURE — 11042 DBRDMT SUBQ TIS 1ST 20SQCM/<: CPT | Performed by: PODIATRIST

## 2020-12-18 RX ORDER — OXYCODONE AND ACETAMINOPHEN 5; 325 MG/1; MG/1
1 TABLET ORAL EVERY 6 HOURS PRN
Qty: 28 TABLET | Refills: 0 | Status: SHIPPED | OUTPATIENT
Start: 2020-12-18 | End: 2021-01-06 | Stop reason: SDUPTHER

## 2020-12-18 RX ORDER — OXYCODONE AND ACETAMINOPHEN 5; 325 MG/1; MG/1
1 TABLET ORAL EVERY 6 HOURS PRN
Qty: 28 TABLET | Refills: 0 | Status: SHIPPED | OUTPATIENT
Start: 2020-12-18 | End: 2021-01-06 | Stop reason: ALTCHOICE

## 2020-12-18 NOTE — PROGRESS NOTES
[facility]                Progress Note    Subjective:       Patient ID: Suyapa Connelly is a 55 y.o. female.    Chief Complaint: Non-healing Wound Follow Up and Diabetic Foot Ulcer    12/18/2020: F/U visit. Pt ambulated to exam room with no assistance. No fever. Pt reports pain 4/10 from the wound, tender. Pt stated the pain increased last night 12/10, throbbing feeling.Took 2 (5mg) percocet. Wound is stable with 90% red granular tissue, macerated periwound. Continue same dressing. Discussed elevating/offloading as much as possible.      Review of Systems   Constitutional: Negative.    Respiratory: Negative.    Cardiovascular: Positive for leg swelling.   Gastrointestinal: Negative.    Genitourinary: Negative.    Musculoskeletal: Positive for arthralgias.   Skin: Positive for wound.   Neurological: Negative.    Psychiatric/Behavioral: Negative.          Objective:        Physical Exam  Constitutional:       Appearance: She is well-developed.      Comments: Oriented to time, place, and person.   Cardiovascular:      Comments: DP and PT pulses are palpable bilaterally. 3 sec capillary refill time and toes and feet are warm to touch proximally .  There is  hair growth on the feet and toes b/l. There is no edema b/l. No spider veins or varicosities present b/l.     Musculoskeletal:      Comments: Equinus noted b/l ankles with < 10 deg DF noted. MMT 5/5 in DF/PF/Inv/Ev resistance with no reproduction of pain in any direction. Passive range of motion of ankle and pedal joints is painless b/l.     Feet:      Right foot:      Skin integrity: No callus or dry skin.      Left foot:      Skin integrity: No callus or dry skin.   Lymphadenopathy:      Comments: Negative lymphadenopathy bilateral popliteal fossa and tarsal tunnel.   Skin:     Comments: See wound description    Neurological:      Mental Status: She is alert.      Comments: Light touch, proprioception, and sharp/dull  sensation are all intact bilaterally. Protective threshold with the [place]-Wienstein monofilament is intact bilaterally.    Psychiatric:         Behavior: Behavior is cooperative.         Vitals:    12/18/20 1300   BP: (!) 124/59   Pulse: 94   Resp: 17   Temp: 97.2 °F (36.2 °C)       Assessment:           ICD-10-CM ICD-9-CM   1. Diabetic ulcer of left heel associated with type 2 diabetes mellitus, with bone involvement without evidence of necrosis  E11.621 250.80    L97.426 707.14            Incision/Site 09/08/20 1316 Left Heel (Active)   09/08/20 1316   Present Prior to Hospital Arrival?:    Side: Left   Location: Heel   Orientation:    Incision Type:    Closure Method:    Additional Comments:    Removal Indication and Assessment:    Wound Outcome:    Removal Indications:    Wound Image   12/18/20 1300   Incision WDL ex 12/18/20 1300   Dressing Appearance Intact;Moist drainage 12/18/20 1300   Drainage Amount Moderate 12/18/20 1300   Drainage Characteristics/Odor Serosanguineous 12/18/20 1300   Appearance Pink;Red;Yellow;White 12/18/20 1300   Black (%), Wound Tissue Color 0 % 12/18/20 1300   Red (%), Wound Tissue Color 90 % 12/18/20 1300   Yellow (%), Wound Tissue Color 10 % 12/18/20 1300   Periwound Area Macerated 12/18/20 1300   Wound Edges Open 12/18/20 1300   Wound Length (cm) 1 cm 12/18/20 1300   Wound Width (cm) 1.8 cm 12/18/20 1300   Wound Depth (cm) 0.4 cm 12/18/20 1300   Wound Volume (cm^3) 0.72 cm^3 12/18/20 1300   Wound Surface Area (cm^2) 1.8 cm^2 12/18/20 1300   Tunneling (depth (cm)/location) 0 12/18/20 1300   Undermining (depth (cm)/location) 0 12/18/20 1300   Care Soap and water;Sterile normal saline;Wound cleanser 12/18/20 1300   Dressing Applied;Collagen;Other (see comments);Foam 12/18/20 1300   Periwound Care Other (see comments) 12/18/20 1300   Compression Two layer compression 12/18/20 1300   Off Loading Football dressing 12/18/20 1300   Dressing Change Due 12/16/20 12/18/20 1300            Plan:          Debridement: See procedure note    Dressings: per above  Offloading:Foam    Follow-up:Patient is to return to the clinic in 1 week  for follow-up but should call [facility] immediately if any signs of infection, such as fever, chills, sweats, increased redness or pain.    Short-term goals include maintaining good offloading and minimizing bioburden, promoting granulation and epithelialization to healing.  Long-term goals include keeping the wound healed by good offloading and medical management under the direction of internist.          Orders Placed This Encounter   Procedures    Change dressing     Gentian violet periwound, endoform to wound bed, HB transfer cut to fit, double layer drawtex, mextra short X1, scott foam long X3 perpendicular, football dressing (cast padding X3), coflex TLC.        No follow-ups on file.

## 2020-12-21 ENCOUNTER — HOSPITAL ENCOUNTER (OUTPATIENT)
Dept: WOUND CARE | Facility: HOSPITAL | Age: 55
Discharge: HOME | End: 2020-12-21
Attending: FAMILY MEDICINE
Payer: MEDICARE

## 2020-12-21 DIAGNOSIS — M86.172 ACUTE OSTEOMYELITIS OF LEFT CALCANEUS: ICD-10-CM

## 2020-12-21 DIAGNOSIS — L97.426 DIABETIC ULCER OF LEFT HEEL ASSOCIATED WITH TYPE 2 DIABETES MELLITUS, WITH BONE INVOLVEMENT WITHOUT EVIDENCE OF NECROSIS: Primary | ICD-10-CM

## 2020-12-21 DIAGNOSIS — E11.621 DIABETIC ULCER OF LEFT HEEL ASSOCIATED WITH TYPE 2 DIABETES MELLITUS, WITH BONE INVOLVEMENT WITHOUT EVIDENCE OF NECROSIS: Primary | ICD-10-CM

## 2020-12-21 LAB
POCT GLUCOSE: 140 MG/DL (ref 70–110)
POCT GLUCOSE: 194 MG/DL (ref 70–110)

## 2020-12-21 PROCEDURE — 99183 HYPERBARIC OXYGEN THERAPY: CPT | Mod: ,,, | Performed by: FAMILY MEDICINE

## 2020-12-21 PROCEDURE — 99183 PR HYPERBARIC OXYGEN THERAPY ATTENDANCE/SUPERVISION, PER SESSION: ICD-10-PCS | Mod: ,,, | Performed by: FAMILY MEDICINE

## 2020-12-21 NOTE — PROCEDURES
Wound Debridement    Date/Time: 12/18/2020 3:00 PM  Performed by: Rosio Mayes DPM  Authorized by: Rosio Mayes DPM     Consent Done?:  Yes (Written)    Wound Details:    Location:  Left foot    Location:  Left Heel    Type of Debridement:  Excisional       Length (cm):  1       Area (sq cm):  1.8       Width (cm):  1.8       Percent Debrided (%):  100       Depth (cm):  0.4       Total Area Debrided (sq cm):  1.8    Depth of debridement:  Subcutaneous tissue    Tissue debrided:  Dermis    Devitalized tissue debrided:  Biofilm, Callus and Fibrin    Instruments:  Curette    Bleeding:  Minimal  Hemostasis Achieved: Yes    Method Used:  Pressure  Patient tolerance:  Patient tolerated the procedure well with no immediate complications

## 2020-12-21 NOTE — PROGRESS NOTES
12/21/20 1546   Hyperbaric Pre-Inspection   Mattress cleaned prior to treatment Yes   2 Patient Identifiers Verified Yes   Consent Obtained Yes   Cotton Gown Yes   Patient voided Yes   Drainage Bags Emptied Not applicable   Patient Pregnant No   Glasses, Jewelry, Makeup, etc. Removed Yes   Dentures, Hearing Aid, Prosthetic Devices Removed Not applicable   Touch hair to check for hair spray Yes   Cold/Flu Symptoms No   Diabetic Patient Eaten Yes   Medications Given Not applicable   Fears and apprehensions verbalized No   Ground Wire Secured Yes   HBO Treatment Course Details   Treatment Course Number 1   Total Treatments Ordered 30   Ordering Provider MD Cortes   Indications Diabetic wounds of lower extremities   HBO Treatment Start Date 10/06/20   HBO Treatment Details   Treatment Number 24   Inpatient Visit No   Total Treatment Length Calc (minutes) 106   Chamber Type Monoplace   Chamber # 2   HBO Dive Log # 581   Treatment Protocol 2.0 ANAND x 120 minutes w/100% oxygen and no air break   Treatment Details   Dive Rate Down 2.0 psi/minute   Dive Rate Up 2.0 psi/minute   Compress Begins 1 ANAND   Clock Time 1333   Tx Pressure Reached 2 ANAND   Clock Time 1341   Decompress Begins 2 ANAND   Clock Time 1511   Decompress Ends 1 ANAND   Clock Time 1519   Pre HBO Vital Signs   /70   Pulse 92   Resp 18   Temp 97.5 °F (36.4 °C)   Blood Glucose 194   Pain Level 0   Post HBO Vital Signs   BP (!) 176/81   Pulse 97   Resp 18   Blood Glucose 140   Pain Level 0       1. Diabetic ulcer of left heel associated with type 2 diabetes mellitus, with bone involvement without evidence of necrosis    2. Acute osteomyelitis of left calcaneus    Patient able to clear bilateral ears effectively during treatment descent and reached treatment depth with no c/o ear pain or pressure. Tolerated treatment well. Denies h/a, blurred vision, or dizziness related to elevate BP.      Physician Supervision  I provided direct supervision and was immediately  available to furnish assistance and direction throughout the performance of the procedure.    Emergency Response Team  A trained emergency response team and emergency services were available throughout procedure.

## 2020-12-22 ENCOUNTER — HOSPITAL ENCOUNTER (OUTPATIENT)
Dept: WOUND CARE | Facility: HOSPITAL | Age: 55
Discharge: HOME | End: 2020-12-22
Attending: FAMILY MEDICINE
Payer: MEDICARE

## 2020-12-22 VITALS
TEMPERATURE: 98 F | HEART RATE: 79 BPM | DIASTOLIC BLOOD PRESSURE: 80 MMHG | RESPIRATION RATE: 18 BRPM | SYSTOLIC BLOOD PRESSURE: 176 MMHG

## 2020-12-22 DIAGNOSIS — L97.426 DIABETIC ULCER OF LEFT HEEL ASSOCIATED WITH TYPE 2 DIABETES MELLITUS, WITH BONE INVOLVEMENT WITHOUT EVIDENCE OF NECROSIS: Primary | ICD-10-CM

## 2020-12-22 DIAGNOSIS — I73.9 PERIPHERAL VASCULAR DISEASE: ICD-10-CM

## 2020-12-22 DIAGNOSIS — E11.621 DIABETIC ULCER OF LEFT HEEL ASSOCIATED WITH TYPE 2 DIABETES MELLITUS, WITH BONE INVOLVEMENT WITHOUT EVIDENCE OF NECROSIS: Primary | ICD-10-CM

## 2020-12-22 DIAGNOSIS — M86.172 ACUTE OSTEOMYELITIS OF LEFT CALCANEUS: ICD-10-CM

## 2020-12-22 LAB
POCT GLUCOSE: 167 MG/DL (ref 70–110)
POCT GLUCOSE: 223 MG/DL (ref 70–110)

## 2020-12-22 PROCEDURE — 82962 GLUCOSE BLOOD TEST: CPT | Performed by: FAMILY MEDICINE

## 2020-12-22 PROCEDURE — G0277 HBOT, FULL BODY CHAMBER, 30M: HCPCS

## 2020-12-22 PROCEDURE — 99183 PR HYPERBARIC OXYGEN THERAPY ATTENDANCE/SUPERVISION, PER SESSION: ICD-10-PCS | Mod: ,,, | Performed by: FAMILY MEDICINE

## 2020-12-22 PROCEDURE — 99183 HYPERBARIC OXYGEN THERAPY: CPT | Mod: ,,, | Performed by: FAMILY MEDICINE

## 2020-12-22 PROCEDURE — 29581 APPL MULTLAYER CMPRN SYS LEG: CPT

## 2020-12-22 NOTE — PROGRESS NOTES
12/22/20 1300   Hyperbaric Pre-Inspection   Mattress cleaned prior to treatment Yes   2 Patient Identifiers Verified Yes   Consent Obtained Yes   Cotton Gown Yes   Patient voided Yes   Drainage Bags Emptied Not applicable   Patient Pregnant No   Glasses, Jewelry, Makeup, etc. Removed Yes   Dentures, Hearing Aid, Prosthetic Devices Removed Yes   Touch hair to check for hair spray Yes   Cold/Flu Symptoms No   Diabetic Patient Eaten Yes   Medications Given Not applicable   Fears and apprehensions verbalized Yes   Ground Wire Secured Yes   HBO Treatment Course Details   Treatment Course Number 1   Ordering Provider Denny KAY   Indications Chronic refractory osteomyelitis;Diabetic wounds of lower extremities   HBO Treatment Start Date 10/06/20   HBO Treatment Details   Treatment Number 25   Total Treatment Length Calc (minutes) 90   Chamber # 2   HBO Dive Log # [id number]   Treatment Protocol 2.0 ANAND x 90 minutes w/100% oxygen and no air break   Treatment Details   Dive Rate Down 2.0 psi/minute   Dive Rate Up 2.0 psi/minute   Compress Begins 1 ANAND   Clock Time 1350   Tx Pressure Reached 2 ANAND   Clock Time 1358   Decompress Begins 2 ANAND   Clock Time 1512   Decompress Ends 1 ANAND   Clock Time 1520   Pre HBO Vital Signs   BP (!) 178/81  (repeat blood pressure 142/69)   Pulse 94   Resp 20   Temp 97.5 °F (36.4 °C)   Blood Glucose 223   Glucose Meter # wc   Pain Level 0   Post HBO Vital Signs   BP (!) 176/80   Pulse 79   Resp 18   Blood Glucose 167   Glucose Meter # wc   Pain Level 0       Arrived awake and alert. ABC's intact. Vitals as above. Cleared for treatment by Catherine KAY Tolerated treatment without complaints. Cleared from chamber without incidents. Follow up tomorrow for HBO.          ICD-10-CM ICD-9-CM   1. 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 Acute osteomyelitis of left calcaneus  M86.172 730.07         Physician Supervision  I provided direct supervision and was immediately available to furnish assistance and direction throughout the performance of the procedure.    Emergency Response Team  A trained emergency response team and emergency services were available throughout procedure.

## 2020-12-22 NOTE — PROGRESS NOTES
[facility]  [address]  [address]  Nurse Visit    Subjective:       Patient seen in clinic today.  Dressing changed as ordered. Endoform not reapplied prior to Hydrofera Blue as patient had puss pocket form behind endoform which did not incorporate into wound but acted as a drainage trap. Applied Hydrofera Blue Transfer directly against wound bed with drawtex/mextra/lindsay foam/cast padding/coflex zinc (as we are currently out of TLC).       Assessment:          Incision/Site 09/08/20 1316 Left Heel (Active)   09/08/20 1316   Present Prior to Hospital Arrival?:    Side: Left   Location: Heel   Orientation:    Incision Type:    Closure Method:    Additional Comments:    Removal Indication and Assessment:    Wound Outcome:    Removal Indications:    Incision WDL ex 12/22/20 1600   Dressing Appearance Moist drainage 12/22/20 1600   Drainage Amount Small 12/22/20 1600   Drainage Characteristics/Odor Creamy;Serous;No odor 12/22/20 1600   Appearance Red;Yellow;Slough 12/22/20 1600   Red (%), Wound Tissue Color 50 % 12/22/20 1600   Yellow (%), Wound Tissue Color 50 % 12/22/20 1600   Periwound Area Intact 12/22/20 1600   Wound Edges Defined;Rolled/closed 12/22/20 1600   Care Cleansed with:;Soap and water;Sterile normal saline 12/22/20 1600   Dressing Changed;Methylene blue/gentian violet;Foam;Other (see comments);Compression wrap 12/22/20 1600   Compression Two layer compression 12/22/20 1600   Off Loading Football dressing 12/22/20 1600           Plan:     No orders of the defined types were placed in this encounter.          Follow up in about 2 days (around 12/24/2020) for wound care.

## 2020-12-23 ENCOUNTER — HOSPITAL ENCOUNTER (OUTPATIENT)
Dept: WOUND CARE | Facility: HOSPITAL | Age: 55
Discharge: HOME | End: 2020-12-23
Attending: FAMILY MEDICINE
Payer: MEDICARE

## 2020-12-23 DIAGNOSIS — L97.426 DIABETIC ULCER OF LEFT HEEL ASSOCIATED WITH TYPE 2 DIABETES MELLITUS, WITH BONE INVOLVEMENT WITHOUT EVIDENCE OF NECROSIS: Primary | ICD-10-CM

## 2020-12-23 DIAGNOSIS — E11.621 DIABETIC ULCER OF LEFT HEEL ASSOCIATED WITH TYPE 2 DIABETES MELLITUS, WITH BONE INVOLVEMENT WITHOUT EVIDENCE OF NECROSIS: Primary | ICD-10-CM

## 2020-12-23 DIAGNOSIS — M86.172 ACUTE OSTEOMYELITIS OF LEFT CALCANEUS: ICD-10-CM

## 2020-12-23 LAB
POCT GLUCOSE: 163 MG/DL (ref 70–110)
POCT GLUCOSE: 246 MG/DL (ref 70–110)

## 2020-12-23 PROCEDURE — 99183 HYPERBARIC OXYGEN THERAPY: CPT | Mod: ,,, | Performed by: FAMILY MEDICINE

## 2020-12-23 PROCEDURE — G0277 HBOT, FULL BODY CHAMBER, 30M: HCPCS | Performed by: FAMILY MEDICINE

## 2020-12-23 PROCEDURE — 82962 GLUCOSE BLOOD TEST: CPT | Performed by: FAMILY MEDICINE

## 2020-12-23 PROCEDURE — 99183 PR HYPERBARIC OXYGEN THERAPY ATTENDANCE/SUPERVISION, PER SESSION: ICD-10-PCS | Mod: ,,, | Performed by: FAMILY MEDICINE

## 2020-12-23 NOTE — PROGRESS NOTES
12/23/20 1300   Hyperbaric Pre-Inspection   Mattress cleaned prior to treatment Yes   2 Patient Identifiers Verified Yes   Consent Obtained Yes   Cotton Gown Yes   Patient voided Yes   Drainage Bags Emptied Not applicable   Patient Pregnant Yes   Glasses, Jewelry, Makeup, etc. Removed Yes   Dentures, Hearing Aid, Prosthetic Devices Removed Yes   Touch hair to check for hair spray Yes   Cold/Flu Symptoms No   Diabetic Patient Eaten Yes   Medications Given Not applicable   Fears and apprehensions verbalized Yes   Ground Wire Secured Yes   HBO Treatment Course Details   Treatment Course Number 1   Ordering Provider Suman KAY   Indications Diabetic wounds of lower extremities   HBO Treatment Start Date 10/06/20   HBO Treatment Details   Treatment Number 26   Inpatient Visit No   Total Treatment Length Calc (minutes) 106   Chamber Type Monoplace   Chamber # 2   HBO Dive Log # 585   Treatment Protocol 2.0 ANAND x 90 minutes w/100% oxygen and no air break   Treatment Details   Dive Rate Down 2.0 psi/minute   Dive Rate Up 2.0 psi/minute   Compress Begins 1 ANAND   Clock Time 1343   Tx Pressure Reached 2 ANAND   Clock Time 1351   Decompress Begins 2 ANAND   Clock Time 1521   Decompress Ends 1 ANAND   Clock Time 1529   Pre HBO Vital Signs   BP (!) 174/79  (after approx 10 mins 133/66)   Pulse 94   Resp 20   Temp 97.9 °F (36.6 °C)   Blood Glucose 246   Glucose Meter # wc   Pain Level 0   Post HBO Vital Signs   BP (!) 205/89  (pt is asymptomatic. 133/66 after approx 15 mis.)   Pulse 93   Resp 18   Blood Glucose 163   Pain Level 0       Arrived awake and alert. ABC's intact. Cleared for treatment by Suman KAY Tolerated treatment without complaints. Cleared from chamber without incidents. Follow up monday for HBO.       ICD-10-CM ICD-9-CM   1. 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 Acute osteomyelitis of left  kyeaneus  M86.172 730.07        Physician Supervision  I provided direct supervision and was immediately available to furnish assistance and direction throughout the performance of the procedure.    Emergency Response Team  A trained emergency response team and emergency services were available throughout procedure.

## 2020-12-24 ENCOUNTER — HOSPITAL ENCOUNTER (OUTPATIENT)
Dept: WOUND CARE | Facility: HOSPITAL | Age: 55
Discharge: HOME | End: 2020-12-24
Attending: INTERNAL MEDICINE
Payer: MEDICARE

## 2020-12-24 VITALS
TEMPERATURE: 97 F | HEART RATE: 91 BPM | DIASTOLIC BLOOD PRESSURE: 78 MMHG | RESPIRATION RATE: 17 BRPM | SYSTOLIC BLOOD PRESSURE: 130 MMHG

## 2020-12-24 DIAGNOSIS — E11.621 DIABETIC ULCER OF LEFT HEEL ASSOCIATED WITH TYPE 2 DIABETES MELLITUS, WITH BONE INVOLVEMENT WITHOUT EVIDENCE OF NECROSIS: Primary | ICD-10-CM

## 2020-12-24 DIAGNOSIS — L97.426 DIABETIC ULCER OF LEFT HEEL ASSOCIATED WITH TYPE 2 DIABETES MELLITUS, WITH BONE INVOLVEMENT WITHOUT EVIDENCE OF NECROSIS: Primary | ICD-10-CM

## 2020-12-24 PROCEDURE — 87077 CULTURE AEROBIC IDENTIFY: CPT | Mod: 59

## 2020-12-24 PROCEDURE — 87186 SC STD MICRODIL/AGAR DIL: CPT

## 2020-12-24 PROCEDURE — 87205 SMEAR GRAM STAIN: CPT

## 2020-12-24 PROCEDURE — 27201912 HC WOUND CARE DEBRIDEMENT SUPPLIES: Performed by: INTERNAL MEDICINE

## 2020-12-24 PROCEDURE — 99214 PR OFFICE/OUTPT VISIT, EST, LEVL IV, 30-39 MIN: ICD-10-PCS | Mod: 25,,, | Performed by: INTERNAL MEDICINE

## 2020-12-24 PROCEDURE — 11042 DBRDMT SUBQ TIS 1ST 20SQCM/<: CPT | Mod: ,,, | Performed by: INTERNAL MEDICINE

## 2020-12-24 PROCEDURE — 11042 DBRDMT SUBQ TIS 1ST 20SQCM/<: CPT | Performed by: INTERNAL MEDICINE

## 2020-12-24 PROCEDURE — 87070 CULTURE OTHR SPECIMN AEROBIC: CPT

## 2020-12-24 PROCEDURE — 99214 OFFICE O/P EST MOD 30 MIN: CPT | Mod: 25,,, | Performed by: INTERNAL MEDICINE

## 2020-12-24 PROCEDURE — 11042 DEBRIDEMENT: ICD-10-PCS | Mod: ,,, | Performed by: INTERNAL MEDICINE

## 2020-12-24 NOTE — PROGRESS NOTES
[facility]                Progress Note    Subjective:       Patient ID: Suyapa Connelly is a 55 y.o. female.    Chief Complaint: Non-healing Wound Follow Up and Diabetic Foot Ulcer    12/24/2020: F/U visit. Pt ambulated to exam room with no assistance. No fever. Pt reports pain 8/10 this past week to the left heel from wound. Wound is measuring bigger with callused periwound. 60% red granular tissue and 40% slough. Culture taken due to pus pocket last week. Applied aquacel ag alginate to wound bed. Continue same outer dressing and discussed elevating as much as possible. Had thicker drainage with nurse visit earlier this week no fevers chills pain unchanged      Review of Systems   Constitutional: Negative for activity change, appetite change, fatigue, fever and unexpected weight change.   HENT: Negative for ear pain, rhinorrhea and sore throat.    Eyes: Negative for discharge and visual disturbance.   Respiratory: Negative for chest tightness, shortness of breath and wheezing.    Cardiovascular: Negative for chest pain, palpitations and leg swelling.   Gastrointestinal: Negative for abdominal pain, constipation and diarrhea.   Endocrine: Negative for cold intolerance and heat intolerance.   Genitourinary: Negative for dysuria and hematuria.   Musculoskeletal: Positive for arthralgias and gait problem. Negative for joint swelling and neck stiffness.   Skin: Negative for rash.   Neurological: Negative for dizziness, syncope, weakness and headaches.   Psychiatric/Behavioral: Negative for suicidal ideas.         Objective:        Physical Exam    Vitals:    12/24/20 1042   BP: 130/78   Pulse: 91   Resp: 17   Temp: 97.2 °F (36.2 °C)       Assessment:           ICD-10-CM ICD-9-CM   1. Diabetic ulcer of left heel associated with type 2 diabetes mellitus, with bone involvement without evidence of necrosis  E11.621 250.80    L97.426 707.14            Incision/Site 09/08/20  1316 Left Heel (Active)   09/08/20 1316   Present Prior to Hospital Arrival?:    Side: Left   Location: Heel   Orientation:    Incision Type:    Closure Method:    Additional Comments:    Removal Indication and Assessment:    Wound Outcome:    Removal Indications:    Wound Image   12/24/20 1000   Incision WDL ex 12/24/20 1000   Dressing Appearance Intact;Moist drainage 12/24/20 1000   Drainage Amount Small 12/24/20 1000   Drainage Characteristics/Odor Serosanguineous;Brown 12/24/20 1000   Appearance Red;Slough 12/24/20 1000   Black (%), Wound Tissue Color 0 % 12/24/20 1000   Red (%), Wound Tissue Color 60 % 12/24/20 1000   Yellow (%), Wound Tissue Color 40 % 12/24/20 1000   Periwound Area Dry 12/24/20 1000   Wound Edges Callused;Defined 12/24/20 1000   Wound Length (cm) 1.2 cm 12/24/20 1000   Wound Width (cm) 2 cm 12/24/20 1000   Wound Depth (cm) 0.5 cm 12/24/20 1000   Wound Volume (cm^3) 1.2 cm^3 12/24/20 1000   Wound Surface Area (cm^2) 2.4 cm^2 12/24/20 1000   Tunneling (depth (cm)/location) 0 12/24/20 1000   Undermining (depth (cm)/location) 0 12/24/20 1000   Care Soap and water;Sterile normal saline;Wound cleanser 12/24/20 1000   Dressing Applied;Other (see comments);Foam 12/24/20 1000   Compression Two layer compression 12/24/20 1000   Off Loading Football dressing 12/24/20 1000   Dressing Change Due 12/29/20 12/24/20 1000           Plan:          debridement done today  Culture taken today to evaluate for bacterial overgrowth switch to silver alginate to antimicrobial effect return one week    Orders Placed This Encounter   Procedures    CULTURE, TISSUE    Change dressing     Clean wound with normal saline. Apply aquacel ag alginate cut to fit to wound bed, double layer drawtex, mextra short X1, scott foam long X2 perpendicular, football dressing (cast padding X3), coflex calamine.        No follow-ups on file.

## 2020-12-24 NOTE — PROCEDURES
"Debridement    Date/Time: 12/24/2020 11:05 AM  Performed by: Neftali Juarez MD  Authorized by: Neftali Juarez MD     Time out: Immediately prior to procedure a "time out" was called to verify the correct patient, procedure, equipment, support staff and site/side marked as required.    Consent Done?:  Yes (Verbal)  Local anesthetic:  Topical anesthetic    Wound Details:    Location:  Left foot    Location:  Left Heel    Type of Debridement:  Excisional       Length (cm):  1.2       Area (sq cm):  2.4       Width (cm):  2       Percent Debrided (%):  100       Depth (cm):  0.5       Total Area Debrided (sq cm):  2.4    Depth of debridement:  Subcutaneous tissue    Tissue debrided:  Subcutaneous    Devitalized tissue debrided:  Biofilm, Callus and Slough    Instruments:  Curette    Bleeding:  Minimal  Hemostasis Achieved: Yes    Method Used:  Pressure  Patient tolerance:  Patient tolerated the procedure well with no immediate complications      "

## 2020-12-27 LAB
BACTERIA TISS AEROBE CULT: ABNORMAL
BACTERIA TISS AEROBE CULT: ABNORMAL
GRAM STN SPEC: ABNORMAL
GRAM STN SPEC: ABNORMAL

## 2020-12-28 ENCOUNTER — PATIENT MESSAGE (OUTPATIENT)
Dept: FAMILY MEDICINE | Facility: CLINIC | Age: 55
End: 2020-12-28

## 2020-12-28 DIAGNOSIS — E11.621 DIABETIC ULCER OF HEEL ASSOCIATED WITH TYPE 2 DIABETES MELLITUS, WITH FAT LAYER EXPOSED, UNSPECIFIED LATERALITY: Primary | ICD-10-CM

## 2020-12-28 DIAGNOSIS — L03.116 CELLULITIS OF LEFT LOWER EXTREMITY: ICD-10-CM

## 2020-12-28 DIAGNOSIS — L97.402 DIABETIC ULCER OF HEEL ASSOCIATED WITH TYPE 2 DIABETES MELLITUS, WITH FAT LAYER EXPOSED, UNSPECIFIED LATERALITY: Primary | ICD-10-CM

## 2020-12-28 NOTE — TELEPHONE ENCOUNTER
Pseudomonas from foot wound has allergy to flouroquinolones will need infectious disease consultation to determine suitable antibiotics

## 2020-12-29 ENCOUNTER — HOSPITAL ENCOUNTER (OUTPATIENT)
Dept: WOUND CARE | Facility: HOSPITAL | Age: 55
Discharge: HOME | End: 2020-12-29
Attending: FAMILY MEDICINE
Payer: MEDICARE

## 2020-12-29 DIAGNOSIS — M86.172 ACUTE OSTEOMYELITIS OF LEFT CALCANEUS: ICD-10-CM

## 2020-12-29 DIAGNOSIS — E11.621 DIABETIC ULCER OF LEFT HEEL ASSOCIATED WITH TYPE 2 DIABETES MELLITUS, WITH BONE INVOLVEMENT WITHOUT EVIDENCE OF NECROSIS: Primary | ICD-10-CM

## 2020-12-29 DIAGNOSIS — L97.426 DIABETIC ULCER OF LEFT HEEL ASSOCIATED WITH TYPE 2 DIABETES MELLITUS, WITH BONE INVOLVEMENT WITHOUT EVIDENCE OF NECROSIS: Primary | ICD-10-CM

## 2020-12-29 LAB
POCT GLUCOSE: 244 MG/DL (ref 70–110)
POCT GLUCOSE: 351 MG/DL (ref 70–110)

## 2020-12-29 PROCEDURE — 99183 HYPERBARIC OXYGEN THERAPY: CPT | Mod: ,,, | Performed by: FAMILY MEDICINE

## 2020-12-29 PROCEDURE — G0277 HBOT, FULL BODY CHAMBER, 30M: HCPCS | Performed by: FAMILY MEDICINE

## 2020-12-29 PROCEDURE — 99183 PR HYPERBARIC OXYGEN THERAPY ATTENDANCE/SUPERVISION, PER SESSION: ICD-10-PCS | Mod: ,,, | Performed by: FAMILY MEDICINE

## 2020-12-29 PROCEDURE — 82962 GLUCOSE BLOOD TEST: CPT | Performed by: FAMILY MEDICINE

## 2020-12-29 NOTE — PROGRESS NOTES
12/29/20 1300   Hyperbaric Pre-Inspection   Mattress cleaned prior to treatment Yes   2 Patient Identifiers Verified Yes   Consent Obtained Yes   Cotton Gown Yes   Patient voided Yes   Drainage Bags Emptied Not applicable   Patient Pregnant No   Glasses, Jewelry, Makeup, etc. Removed Yes   Dentures, Hearing Aid, Prosthetic Devices Removed Yes   Touch hair to check for hair spray Yes   Cold/Flu Symptoms No   Diabetic Patient Eaten Yes   Medications Given Not applicable   Fears and apprehensions verbalized Yes   Ground Wire Secured Yes   HBO Treatment Course Details   Treatment Course Number 1   Ordering Provider Suman WRIGHT   Indications Diabetic wounds of lower extremities   HBO Treatment Start Date 10/06/20   HBO Treatment Details   Treatment Number 27   Inpatient Visit No   Total Treatment Length Calc (minutes) 106   Chamber Type Monoplace   Chamber # 2   HBO Dive Log # 589   Treatment Protocol 2.0 ANAND x 90 minutes w/100% oxygen and two 5 min air breaks   Treatment Details   Dive Rate Down 2.0 psi/minute   Dive Rate Up 2.0 psi/minute   Compress Begins 1 ANAND   Clock Time 1423   Tx Pressure Reached 2 ANAND   Clock Time 1431   Air Breaks and Breathing Periods 1 1501   Clock Time 1506   Air Breaks and Breathing Periods 2 1531   Clock Time 2 1536   Decompress Begins 2 ANAND   Clock Time 1601   Decompress Ends 1 ANAND   Clock Time 1609   Pre HBO Vital Signs   BP (!) 171/86  (waited approx 15 mins repeat was 119/73)   Pulse 99   Resp 20   Temp 97.7 °F (36.5 °C)   Blood Glucose 351   Glucose Meter # wc   Action Taken Administer 2 air breaks duting treatment   Pain Level 0   Post HBO Vital Signs   BP (!) 145/87   Pulse 86   Resp 18   Blood Glucose 244   Glucose Meter # wc   Pain Level 0         Arrived awake and alert. ABC's intact. Vitals as above. Cleared for treatment by Catherine KAY Tolerated treatment without complaints. Cleared from chamber without incidents. Follow up tomorrow for HBO.       ICD-10-CM ICD-9-CM   1.  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 Acute osteomyelitis of left calcaneus  M86.172 730.07        Physician Supervision  I provided direct supervision and was immediately available to furnish assistance and direction throughout the performance of the procedure.    Emergency Response Team  A trained emergency response team and emergency services were available throughout procedure.

## 2020-12-30 ENCOUNTER — TELEPHONE (OUTPATIENT)
Dept: ADMINISTRATIVE | Facility: HOSPITAL | Age: 55
End: 2020-12-30

## 2020-12-30 ENCOUNTER — HOSPITAL ENCOUNTER (OUTPATIENT)
Dept: WOUND CARE | Facility: HOSPITAL | Age: 55
Discharge: HOME | End: 2020-12-30
Attending: FAMILY MEDICINE
Payer: MEDICARE

## 2020-12-30 VITALS
RESPIRATION RATE: 18 BRPM | TEMPERATURE: 98 F | HEART RATE: 96 BPM | SYSTOLIC BLOOD PRESSURE: 189 MMHG | DIASTOLIC BLOOD PRESSURE: 87 MMHG

## 2020-12-30 DIAGNOSIS — M86.172 ACUTE OSTEOMYELITIS OF LEFT CALCANEUS: ICD-10-CM

## 2020-12-30 DIAGNOSIS — E11.621 DIABETIC ULCER OF LEFT HEEL ASSOCIATED WITH TYPE 2 DIABETES MELLITUS, WITH BONE INVOLVEMENT WITHOUT EVIDENCE OF NECROSIS: Primary | ICD-10-CM

## 2020-12-30 DIAGNOSIS — E11.621 DIABETIC ULCER OF LEFT HEEL ASSOCIATED WITH TYPE 2 DIABETES MELLITUS, WITH BONE INVOLVEMENT WITHOUT EVIDENCE OF NECROSIS: ICD-10-CM

## 2020-12-30 DIAGNOSIS — L97.426 DIABETIC ULCER OF LEFT HEEL ASSOCIATED WITH TYPE 2 DIABETES MELLITUS, WITH BONE INVOLVEMENT WITHOUT EVIDENCE OF NECROSIS: ICD-10-CM

## 2020-12-30 DIAGNOSIS — L97.426 DIABETIC ULCER OF LEFT HEEL ASSOCIATED WITH TYPE 2 DIABETES MELLITUS, WITH BONE INVOLVEMENT WITHOUT EVIDENCE OF NECROSIS: Primary | ICD-10-CM

## 2020-12-30 DIAGNOSIS — I73.9 PERIPHERAL VASCULAR DISEASE: Primary | ICD-10-CM

## 2020-12-30 DIAGNOSIS — I73.9 PAD (PERIPHERAL ARTERY DISEASE): ICD-10-CM

## 2020-12-30 LAB
POCT GLUCOSE: 204 MG/DL (ref 70–110)
POCT GLUCOSE: 247 MG/DL (ref 70–110)

## 2020-12-30 PROCEDURE — 99183 HYPERBARIC OXYGEN THERAPY: CPT | Mod: ,,, | Performed by: FAMILY MEDICINE

## 2020-12-30 PROCEDURE — 99214 PR OFFICE/OUTPT VISIT, EST, LEVL IV, 30-39 MIN: ICD-10-PCS | Mod: 25,,, | Performed by: FAMILY MEDICINE

## 2020-12-30 PROCEDURE — 99214 OFFICE O/P EST MOD 30 MIN: CPT | Mod: 25,,, | Performed by: FAMILY MEDICINE

## 2020-12-30 PROCEDURE — 11042 DBRDMT SUBQ TIS 1ST 20SQCM/<: CPT | Performed by: FAMILY MEDICINE

## 2020-12-30 PROCEDURE — G0277 HBOT, FULL BODY CHAMBER, 30M: HCPCS | Performed by: FAMILY MEDICINE

## 2020-12-30 PROCEDURE — 11042 DEBRIDEMENT: ICD-10-PCS | Mod: ,,, | Performed by: FAMILY MEDICINE

## 2020-12-30 PROCEDURE — 82962 GLUCOSE BLOOD TEST: CPT | Performed by: FAMILY MEDICINE

## 2020-12-30 PROCEDURE — 27201912 HC WOUND CARE DEBRIDEMENT SUPPLIES: Performed by: FAMILY MEDICINE

## 2020-12-30 PROCEDURE — 99183 PR HYPERBARIC OXYGEN THERAPY ATTENDANCE/SUPERVISION, PER SESSION: ICD-10-PCS | Mod: ,,, | Performed by: FAMILY MEDICINE

## 2020-12-30 PROCEDURE — 11042 DBRDMT SUBQ TIS 1ST 20SQCM/<: CPT | Mod: ,,, | Performed by: FAMILY MEDICINE

## 2020-12-30 NOTE — PROGRESS NOTES
[facility]                Progress Note    Subjective:       Patient ID: Suyapa Connelly is a 55 y.o. female.    Chief Complaint: Diabetic Foot Ulcer    Patient ambulated to exam chair from HBO suite following treatment. Pain off and on. Left Heel Ulcer stagnant and has regrown slough although slightly more shallow this week. Still has malodor and tough periwound callous. Cleaned up wound today. Waiting on ID appointment for antibiotic management as they are booked at this time. Trying to find patient a sooner appointment.       Review of Systems   Constitutional: Negative for activity change, appetite change and fever.   HENT: Negative for congestion.    Respiratory: Negative for shortness of breath and wheezing.    Cardiovascular: Negative for chest pain and leg swelling.   Gastrointestinal: Negative for abdominal pain, nausea and vomiting.   Skin: Positive for wound.   Neurological: Negative for dizziness and headaches.   Psychiatric/Behavioral: The patient is not nervous/anxious.          Objective:        Physical Exam      Vitals:    12/30/20 1530   BP: (!) 189/87   Pulse: 96   Resp: 18   Temp: 97.7 °F (36.5 °C)       Assessment:           ICD-10-CM ICD-9-CM   1. Peripheral vascular disease  I73.9 443.9   2. PAD (peripheral artery disease)  I73.9 443.9   3. Diabetic ulcer of left heel associated with type 2 diabetes mellitus, with bone involvement without evidence of necrosis  E11.621 250.80    L97.426 707.14            Incision/Site 09/08/20 1316 Left Heel (Active)   09/08/20 1316   Present Prior to Hospital Arrival?:    Side: Left   Location: Heel   Orientation:    Incision Type:    Closure Method:    Additional Comments:    Removal Indication and Assessment:    Wound Outcome:    Removal Indications:    Wound Image   12/30/20 1500   Incision WDL ex 12/30/20 1500   Dressing Appearance Moist drainage 12/30/20 1500   Drainage Amount Small 12/30/20 1500   Drainage  Characteristics/Odor Serosanguineous 12/30/20 1500   Appearance Red;Yellow;Slough 12/30/20 1500   Red (%), Wound Tissue Color 80 % 12/30/20 1500   Yellow (%), Wound Tissue Color 20 % 12/30/20 1500   Periwound Area Pale white 12/30/20 1500   Wound Edges Callused 12/30/20 1500   Wound Length (cm) 1.5 cm 12/30/20 1500   Wound Width (cm) 2 cm 12/30/20 1500   Wound Depth (cm) 0.4 cm 12/30/20 1500   Wound Volume (cm^3) 1.2 cm^3 12/30/20 1500   Wound Surface Area (cm^2) 3 cm^2 12/30/20 1500   Care Cleansed with:;Sterile normal saline;Soap and water 12/30/20 1500   Dressing Changed 12/30/20 1500   Compression Two layer compression 12/30/20 1500   Off Loading Football dressing;Off loading shoe 12/30/20 1500   Dressing Change Due 01/06/21 12/30/20 1500           Debridement    Date/Time: 12/30/2020 10:24 PM  Performed by: Colleen Cortes MD  Authorized by: Colleen Cortes MD     Consent Done?:  Yes (Verbal)  Local anesthesia used?: Yes    Local anesthetic:  Topical anesthetic    Wound Details:    Location:  Left foot    Location:  Left Heel    Type of Debridement:  Excisional       Length (cm):  1.5       Area (sq cm):  3       Width (cm):  2       Percent Debrided (%):  100       Depth (cm):  0.4       Total Area Debrided (sq cm):  3    Depth of debridement:  Subcutaneous tissue    Tissue debrided:  Subcutaneous    Devitalized tissue debrided:  Slough and Callus    Instruments:  Curette    Bleeding:  Minimal  Hemostasis Achieved: Yes    Method Used:  Pressure  Patient tolerance:  Patient tolerated the procedure well with no immediate complications      Plan:                Orders Placed This Encounter   Procedures    Change dressing     Clean wound with normal saline. Apply aquacel ag alginate cut to fit to wound bed, double layer drawtex, mextra short X1, scott foam long X2 perpendicular, football dressing (cast padding X3), coflex calamine.        Follow up in about 9 days (around 1/8/2021) for wound care.

## 2020-12-30 NOTE — PROGRESS NOTES
12/30/20 1300   Hyperbaric Pre-Inspection   Mattress cleaned prior to treatment Yes   2 Patient Identifiers Verified Yes   Consent Obtained Yes   Cotton Gown Yes   Patient voided Yes   Drainage Bags Emptied Not applicable   Patient Pregnant No   Glasses, Jewelry, Makeup, etc. Removed Yes   Dentures, Hearing Aid, Prosthetic Devices Removed Yes   Touch hair to check for hair spray Yes   Cold/Flu Symptoms No   Diabetic Patient Eaten Yes   Medications Given Yes   Fears and apprehensions verbalized Yes   Ground Wire Secured Yes   HBO Treatment Course Details   Treatment Course Number 1   Ordering Provider Suman KAY   Indications Diabetic wounds of lower extremities   HBO Treatment Start Date 10/06/20   HBO Treatment Details   Treatment Number 28   Inpatient Visit No   Total Treatment Length Calc (minutes) 106   Chamber Type Monoplace   Chamber # 2   HBO Dive Log # 590   Treatment Protocol 2.0 ANAND x 90 minutes w/100% oxygen and no air break   Treatment Details   Dive Rate Down 2.0 psi/minute   Dive Rate Up 2.0 psi/minute   Compress Begins 1 ATAq   Clock Time 1337   Tx Pressure Reached 2 ANAND   Clock Time 1345   Decompress Begins 2 ANAND   Clock Time 1515   Decompress Ends 1 ANAND   Clock Time 1523   Pre HBO Vital Signs   /66   Pulse 99   Resp 18   Temp 97.7 °F (36.5 °C)   Glucose Meter # wc   Pain Level 0   Post HBO Vital Signs   BP (!) 189/87   Pulse 96   Resp 18   Blood Glucose 204   Glucose Meter # wc   Pain Level 0       Arrived awake and alert. ABC's intact. Cleared for treatment by Suman KAY Tolerated treatment without complaints. Cleared from chamber without incidents. Follow up tomorrow for HBO.         ICD-10-CM ICD-9-CM   1. 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 Acute osteomyelitis of left calcaneus  M86.172 730.07        Physician Supervision  I provided direct supervision and was  immediately available to furnish assistance and direction throughout the performance of the procedure.    Emergency Response Team  A trained emergency response team and emergency services were available throughout procedure.

## 2021-01-05 ENCOUNTER — OFFICE VISIT (OUTPATIENT)
Dept: INFECTIOUS DISEASES | Facility: CLINIC | Age: 56
End: 2021-01-05
Payer: MEDICARE

## 2021-01-05 ENCOUNTER — LAB VISIT (OUTPATIENT)
Dept: LAB | Facility: HOSPITAL | Age: 56
End: 2021-01-05
Attending: STUDENT IN AN ORGANIZED HEALTH CARE EDUCATION/TRAINING PROGRAM
Payer: MEDICARE

## 2021-01-05 VITALS
TEMPERATURE: 98 F | WEIGHT: 192.25 LBS | HEIGHT: 65 IN | HEART RATE: 105 BPM | DIASTOLIC BLOOD PRESSURE: 57 MMHG | BODY MASS INDEX: 32.03 KG/M2 | SYSTOLIC BLOOD PRESSURE: 144 MMHG

## 2021-01-05 DIAGNOSIS — I21.A9 OTHER MYOCARDIAL INFARCTION TYPE: ICD-10-CM

## 2021-01-05 DIAGNOSIS — E11.621 DIABETIC ULCER OF LEFT HEEL ASSOCIATED WITH TYPE 2 DIABETES MELLITUS, WITH BONE INVOLVEMENT WITHOUT EVIDENCE OF NECROSIS: ICD-10-CM

## 2021-01-05 DIAGNOSIS — N18.32 STAGE 3B CHRONIC KIDNEY DISEASE: ICD-10-CM

## 2021-01-05 DIAGNOSIS — S91.302A NON HEALING LEFT HEEL WOUND: Primary | ICD-10-CM

## 2021-01-05 DIAGNOSIS — E11.65 TYPE 2 DIABETES MELLITUS WITH HYPERGLYCEMIA, WITH LONG-TERM CURRENT USE OF INSULIN: ICD-10-CM

## 2021-01-05 DIAGNOSIS — L97.426 DIABETIC ULCER OF LEFT HEEL ASSOCIATED WITH TYPE 2 DIABETES MELLITUS, WITH BONE INVOLVEMENT WITHOUT EVIDENCE OF NECROSIS: ICD-10-CM

## 2021-01-05 DIAGNOSIS — Z79.4 TYPE 2 DIABETES MELLITUS WITH HYPERGLYCEMIA, WITH LONG-TERM CURRENT USE OF INSULIN: ICD-10-CM

## 2021-01-05 DIAGNOSIS — S91.302A NON HEALING LEFT HEEL WOUND: ICD-10-CM

## 2021-01-05 LAB
ALBUMIN SERPL BCP-MCNC: 2.3 G/DL (ref 3.5–5.2)
ALP SERPL-CCNC: 102 U/L (ref 55–135)
ALT SERPL W/O P-5'-P-CCNC: 6 U/L (ref 10–44)
ANION GAP SERPL CALC-SCNC: 9 MMOL/L (ref 8–16)
AST SERPL-CCNC: 10 U/L (ref 10–40)
BASOPHILS # BLD AUTO: 0.08 K/UL (ref 0–0.2)
BASOPHILS NFR BLD: 0.7 % (ref 0–1.9)
BILIRUB SERPL-MCNC: 0.4 MG/DL (ref 0.1–1)
BUN SERPL-MCNC: 30 MG/DL (ref 6–20)
CALCIUM SERPL-MCNC: 9.3 MG/DL (ref 8.7–10.5)
CHLORIDE SERPL-SCNC: 104 MMOL/L (ref 95–110)
CO2 SERPL-SCNC: 23 MMOL/L (ref 23–29)
CREAT SERPL-MCNC: 1.8 MG/DL (ref 0.5–1.4)
CRP SERPL-MCNC: 96.3 MG/L (ref 0–8.2)
DIFFERENTIAL METHOD: ABNORMAL
EOSINOPHIL # BLD AUTO: 0.1 K/UL (ref 0–0.5)
EOSINOPHIL NFR BLD: 1 % (ref 0–8)
ERYTHROCYTE [DISTWIDTH] IN BLOOD BY AUTOMATED COUNT: 14.2 % (ref 11.5–14.5)
ERYTHROCYTE [SEDIMENTATION RATE] IN BLOOD BY WESTERGREN METHOD: 60 MM/HR (ref 0–36)
EST. GFR  (AFRICAN AMERICAN): 36.3 ML/MIN/1.73 M^2
EST. GFR  (NON AFRICAN AMERICAN): 31.5 ML/MIN/1.73 M^2
ESTIMATED AVG GLUCOSE: 212 MG/DL (ref 68–131)
GLUCOSE SERPL-MCNC: 127 MG/DL (ref 70–110)
HBA1C MFR BLD HPLC: 9 % (ref 4–5.6)
HCT VFR BLD AUTO: 34.1 % (ref 37–48.5)
HGB BLD-MCNC: 10.5 G/DL (ref 12–16)
IMM GRANULOCYTES # BLD AUTO: 0.02 K/UL (ref 0–0.04)
IMM GRANULOCYTES NFR BLD AUTO: 0.2 % (ref 0–0.5)
LYMPHOCYTES # BLD AUTO: 1.5 K/UL (ref 1–4.8)
LYMPHOCYTES NFR BLD: 12.8 % (ref 18–48)
MCH RBC QN AUTO: 30 PG (ref 27–31)
MCHC RBC AUTO-ENTMCNC: 30.8 G/DL (ref 32–36)
MCV RBC AUTO: 97 FL (ref 82–98)
MONOCYTES # BLD AUTO: 0.8 K/UL (ref 0.3–1)
MONOCYTES NFR BLD: 7 % (ref 4–15)
NEUTROPHILS # BLD AUTO: 9.1 K/UL (ref 1.8–7.7)
NEUTROPHILS NFR BLD: 78.3 % (ref 38–73)
NRBC BLD-RTO: 0 /100 WBC
PLATELET # BLD AUTO: 447 K/UL (ref 150–350)
PMV BLD AUTO: 10.5 FL (ref 9.2–12.9)
POTASSIUM SERPL-SCNC: 4.9 MMOL/L (ref 3.5–5.1)
PROT SERPL-MCNC: 7.9 G/DL (ref 6–8.4)
RBC # BLD AUTO: 3.5 M/UL (ref 4–5.4)
SODIUM SERPL-SCNC: 136 MMOL/L (ref 136–145)
WBC # BLD AUTO: 11.56 K/UL (ref 3.9–12.7)

## 2021-01-05 PROCEDURE — 86140 C-REACTIVE PROTEIN: CPT

## 2021-01-05 PROCEDURE — 85025 COMPLETE CBC W/AUTO DIFF WBC: CPT

## 2021-01-05 PROCEDURE — 99999 PR PBB SHADOW E&M-EST. PATIENT-LVL V: ICD-10-PCS | Mod: PBBFAC,GC,, | Performed by: STUDENT IN AN ORGANIZED HEALTH CARE EDUCATION/TRAINING PROGRAM

## 2021-01-05 PROCEDURE — 80053 COMPREHEN METABOLIC PANEL: CPT

## 2021-01-05 PROCEDURE — 99213 OFFICE O/P EST LOW 20 MIN: CPT | Mod: S$PBB,GC,, | Performed by: STUDENT IN AN ORGANIZED HEALTH CARE EDUCATION/TRAINING PROGRAM

## 2021-01-05 PROCEDURE — 36415 COLL VENOUS BLD VENIPUNCTURE: CPT

## 2021-01-05 PROCEDURE — 83036 HEMOGLOBIN GLYCOSYLATED A1C: CPT

## 2021-01-05 PROCEDURE — 85652 RBC SED RATE AUTOMATED: CPT

## 2021-01-05 PROCEDURE — 99213 PR OFFICE/OUTPT VISIT, EST, LEVL III, 20-29 MIN: ICD-10-PCS | Mod: S$PBB,GC,, | Performed by: STUDENT IN AN ORGANIZED HEALTH CARE EDUCATION/TRAINING PROGRAM

## 2021-01-05 PROCEDURE — 99999 PR PBB SHADOW E&M-EST. PATIENT-LVL V: CPT | Mod: PBBFAC,GC,, | Performed by: STUDENT IN AN ORGANIZED HEALTH CARE EDUCATION/TRAINING PROGRAM

## 2021-01-05 PROCEDURE — 99215 OFFICE O/P EST HI 40 MIN: CPT | Mod: PBBFAC | Performed by: STUDENT IN AN ORGANIZED HEALTH CARE EDUCATION/TRAINING PROGRAM

## 2021-01-05 RX ORDER — LEVOFLOXACIN 750 MG/1
750 TABLET ORAL
Qty: 7 TABLET | Refills: 0 | Status: SHIPPED | OUTPATIENT
Start: 2021-01-05 | End: 2021-01-19

## 2021-01-06 ENCOUNTER — HOSPITAL ENCOUNTER (OUTPATIENT)
Dept: WOUND CARE | Facility: HOSPITAL | Age: 56
Discharge: HOME | End: 2021-01-06
Attending: FAMILY MEDICINE
Payer: MEDICARE

## 2021-01-06 DIAGNOSIS — M86.172 ACUTE OSTEOMYELITIS OF LEFT CALCANEUS: ICD-10-CM

## 2021-01-06 DIAGNOSIS — L97.426 DIABETIC ULCER OF LEFT HEEL ASSOCIATED WITH TYPE 2 DIABETES MELLITUS, WITH BONE INVOLVEMENT WITHOUT EVIDENCE OF NECROSIS: Primary | ICD-10-CM

## 2021-01-06 DIAGNOSIS — E11.621 DIABETIC ULCER OF LEFT HEEL ASSOCIATED WITH TYPE 2 DIABETES MELLITUS, WITH BONE INVOLVEMENT WITHOUT EVIDENCE OF NECROSIS: Primary | ICD-10-CM

## 2021-01-06 LAB
POCT GLUCOSE: 157 MG/DL (ref 70–110)
POCT GLUCOSE: 233 MG/DL (ref 70–110)

## 2021-01-06 PROCEDURE — 82962 GLUCOSE BLOOD TEST: CPT | Performed by: FAMILY MEDICINE

## 2021-01-06 PROCEDURE — G0277 HBOT, FULL BODY CHAMBER, 30M: HCPCS | Performed by: FAMILY MEDICINE

## 2021-01-06 PROCEDURE — 99183 HYPERBARIC OXYGEN THERAPY: CPT | Mod: ,,, | Performed by: FAMILY MEDICINE

## 2021-01-06 PROCEDURE — 99183 PR HYPERBARIC OXYGEN THERAPY ATTENDANCE/SUPERVISION, PER SESSION: ICD-10-PCS | Mod: ,,, | Performed by: FAMILY MEDICINE

## 2021-01-06 RX ORDER — OXYCODONE AND ACETAMINOPHEN 5; 325 MG/1; MG/1
1 TABLET ORAL EVERY 6 HOURS PRN
Qty: 28 TABLET | Refills: 0 | Status: SHIPPED | OUTPATIENT
Start: 2021-01-06 | End: 2021-01-15 | Stop reason: SDUPTHER

## 2021-01-07 ENCOUNTER — HOSPITAL ENCOUNTER (OUTPATIENT)
Dept: WOUND CARE | Facility: HOSPITAL | Age: 56
Discharge: HOME | End: 2021-01-07
Attending: INTERNAL MEDICINE
Payer: MEDICARE

## 2021-01-07 DIAGNOSIS — L97.426 DIABETIC ULCER OF LEFT HEEL ASSOCIATED WITH TYPE 2 DIABETES MELLITUS, WITH BONE INVOLVEMENT WITHOUT EVIDENCE OF NECROSIS: Primary | ICD-10-CM

## 2021-01-07 DIAGNOSIS — M86.172 ACUTE OSTEOMYELITIS OF LEFT CALCANEUS: ICD-10-CM

## 2021-01-07 DIAGNOSIS — E11.621 DIABETIC ULCER OF LEFT HEEL ASSOCIATED WITH TYPE 2 DIABETES MELLITUS, WITH BONE INVOLVEMENT WITHOUT EVIDENCE OF NECROSIS: Primary | ICD-10-CM

## 2021-01-07 LAB
POCT GLUCOSE: 173 MG/DL (ref 70–110)
POCT GLUCOSE: 216 MG/DL (ref 70–110)

## 2021-01-07 PROCEDURE — G0277 HBOT, FULL BODY CHAMBER, 30M: HCPCS | Performed by: INTERNAL MEDICINE

## 2021-01-07 PROCEDURE — 82962 GLUCOSE BLOOD TEST: CPT | Performed by: INTERNAL MEDICINE

## 2021-01-07 PROCEDURE — 99183 HYPERBARIC OXYGEN THERAPY: CPT | Mod: ,,, | Performed by: INTERNAL MEDICINE

## 2021-01-07 PROCEDURE — 99183 PR HYPERBARIC OXYGEN THERAPY ATTENDANCE/SUPERVISION, PER SESSION: ICD-10-PCS | Mod: ,,, | Performed by: INTERNAL MEDICINE

## 2021-01-08 ENCOUNTER — HOSPITAL ENCOUNTER (OUTPATIENT)
Dept: WOUND CARE | Facility: HOSPITAL | Age: 56
Discharge: HOME | End: 2021-01-08
Attending: PODIATRIST
Payer: MEDICARE

## 2021-01-08 VITALS
RESPIRATION RATE: 17 BRPM | DIASTOLIC BLOOD PRESSURE: 65 MMHG | SYSTOLIC BLOOD PRESSURE: 135 MMHG | HEART RATE: 99 BPM | TEMPERATURE: 97 F

## 2021-01-08 DIAGNOSIS — L97.426 DIABETIC ULCER OF LEFT HEEL ASSOCIATED WITH TYPE 2 DIABETES MELLITUS, WITH BONE INVOLVEMENT WITHOUT EVIDENCE OF NECROSIS: Primary | ICD-10-CM

## 2021-01-08 DIAGNOSIS — E11.621 DIABETIC ULCER OF LEFT HEEL ASSOCIATED WITH TYPE 2 DIABETES MELLITUS, WITH BONE INVOLVEMENT WITHOUT EVIDENCE OF NECROSIS: Primary | ICD-10-CM

## 2021-01-08 PROCEDURE — 11042 WOUND DEBRIDEMENT: ICD-10-PCS | Mod: ,,, | Performed by: PODIATRIST

## 2021-01-08 PROCEDURE — 11042 DBRDMT SUBQ TIS 1ST 20SQCM/<: CPT | Performed by: PODIATRIST

## 2021-01-08 PROCEDURE — 27201912 HC WOUND CARE DEBRIDEMENT SUPPLIES: Performed by: PODIATRIST

## 2021-01-08 PROCEDURE — 99499 NO LOS: ICD-10-PCS | Mod: ,,, | Performed by: PODIATRIST

## 2021-01-08 PROCEDURE — 11042 DBRDMT SUBQ TIS 1ST 20SQCM/<: CPT | Mod: ,,, | Performed by: PODIATRIST

## 2021-01-08 PROCEDURE — 99499 UNLISTED E&M SERVICE: CPT | Mod: ,,, | Performed by: PODIATRIST

## 2021-01-12 DIAGNOSIS — M86.179 OTHER ACUTE OSTEOMYELITIS, UNSPECIFIED ANKLE AND FOOT: ICD-10-CM

## 2021-01-14 ENCOUNTER — HOSPITAL ENCOUNTER (OUTPATIENT)
Dept: RADIOLOGY | Facility: HOSPITAL | Age: 56
Discharge: HOME | End: 2021-01-14
Attending: PODIATRIST
Payer: MEDICARE

## 2021-01-14 DIAGNOSIS — M86.179 OTHER ACUTE OSTEOMYELITIS, UNSPECIFIED ANKLE AND FOOT: ICD-10-CM

## 2021-01-14 PROCEDURE — 73718 MRI LOWER EXTREMITY W/O DYE: CPT | Mod: 26,LT,, | Performed by: RADIOLOGY

## 2021-01-14 PROCEDURE — 73718 MRI FOOT (HINDFOOT) LEFT WITHOUT CONTRAST: ICD-10-PCS | Mod: 26,LT,, | Performed by: RADIOLOGY

## 2021-01-14 PROCEDURE — 73718 MRI LOWER EXTREMITY W/O DYE: CPT | Mod: TC,LT

## 2021-01-15 ENCOUNTER — HOSPITAL ENCOUNTER (OUTPATIENT)
Dept: WOUND CARE | Facility: HOSPITAL | Age: 56
Discharge: HOME | End: 2021-01-15
Attending: PODIATRIST
Payer: MEDICARE

## 2021-01-15 VITALS
DIASTOLIC BLOOD PRESSURE: 61 MMHG | RESPIRATION RATE: 18 BRPM | SYSTOLIC BLOOD PRESSURE: 111 MMHG | HEART RATE: 82 BPM | TEMPERATURE: 98 F

## 2021-01-15 DIAGNOSIS — E11.621 DIABETIC ULCER OF LEFT HEEL ASSOCIATED WITH TYPE 2 DIABETES MELLITUS, WITH BONE INVOLVEMENT WITHOUT EVIDENCE OF NECROSIS: Primary | ICD-10-CM

## 2021-01-15 DIAGNOSIS — L97.426 DIABETIC ULCER OF LEFT HEEL ASSOCIATED WITH TYPE 2 DIABETES MELLITUS, WITH BONE INVOLVEMENT WITHOUT EVIDENCE OF NECROSIS: Primary | ICD-10-CM

## 2021-01-15 PROCEDURE — 99499 NO LOS: ICD-10-PCS | Mod: ,,, | Performed by: PODIATRIST

## 2021-01-15 PROCEDURE — 27201912 HC WOUND CARE DEBRIDEMENT SUPPLIES: Performed by: PODIATRIST

## 2021-01-15 PROCEDURE — 11042 DBRDMT SUBQ TIS 1ST 20SQCM/<: CPT | Mod: ,,, | Performed by: PODIATRIST

## 2021-01-15 PROCEDURE — 99499 UNLISTED E&M SERVICE: CPT | Mod: ,,, | Performed by: PODIATRIST

## 2021-01-15 PROCEDURE — 11042 DBRDMT SUBQ TIS 1ST 20SQCM/<: CPT | Performed by: PODIATRIST

## 2021-01-15 PROCEDURE — 11042 WOUND DEBRIDEMENT: ICD-10-PCS | Mod: ,,, | Performed by: PODIATRIST

## 2021-01-15 RX ORDER — OXYCODONE AND ACETAMINOPHEN 5; 325 MG/1; MG/1
1 TABLET ORAL EVERY 6 HOURS PRN
Qty: 28 TABLET | Refills: 0 | Status: SHIPPED | OUTPATIENT
Start: 2021-01-15 | End: 2021-01-22 | Stop reason: SDUPTHER

## 2021-01-19 ENCOUNTER — LAB VISIT (OUTPATIENT)
Dept: LAB | Facility: HOSPITAL | Age: 56
End: 2021-01-19
Attending: STUDENT IN AN ORGANIZED HEALTH CARE EDUCATION/TRAINING PROGRAM
Payer: MEDICARE

## 2021-01-19 DIAGNOSIS — S91.302A NON HEALING LEFT HEEL WOUND: ICD-10-CM

## 2021-01-19 LAB
ALBUMIN SERPL BCP-MCNC: 2.2 G/DL (ref 3.5–5.2)
ALP SERPL-CCNC: 95 U/L (ref 55–135)
ALT SERPL W/O P-5'-P-CCNC: 5 U/L (ref 10–44)
ANION GAP SERPL CALC-SCNC: 10 MMOL/L (ref 8–16)
AST SERPL-CCNC: 9 U/L (ref 10–40)
BASOPHILS # BLD AUTO: 0.06 K/UL (ref 0–0.2)
BASOPHILS NFR BLD: 0.6 % (ref 0–1.9)
BILIRUB SERPL-MCNC: 0.2 MG/DL (ref 0.1–1)
BUN SERPL-MCNC: 28 MG/DL (ref 6–20)
CALCIUM SERPL-MCNC: 8.9 MG/DL (ref 8.7–10.5)
CHLORIDE SERPL-SCNC: 108 MMOL/L (ref 95–110)
CO2 SERPL-SCNC: 18 MMOL/L (ref 23–29)
CREAT SERPL-MCNC: 1.9 MG/DL (ref 0.5–1.4)
CRP SERPL-MCNC: 15.7 MG/L (ref 0–8.2)
DIFFERENTIAL METHOD: ABNORMAL
EOSINOPHIL # BLD AUTO: 0.3 K/UL (ref 0–0.5)
EOSINOPHIL NFR BLD: 2.7 % (ref 0–8)
ERYTHROCYTE [DISTWIDTH] IN BLOOD BY AUTOMATED COUNT: 14.4 % (ref 11.5–14.5)
ERYTHROCYTE [SEDIMENTATION RATE] IN BLOOD BY WESTERGREN METHOD: >120 MM/HR (ref 0–36)
EST. GFR  (AFRICAN AMERICAN): 34 ML/MIN/1.73 M^2
EST. GFR  (NON AFRICAN AMERICAN): 29.5 ML/MIN/1.73 M^2
GLUCOSE SERPL-MCNC: 263 MG/DL (ref 70–110)
HCT VFR BLD AUTO: 35.3 % (ref 37–48.5)
HGB BLD-MCNC: 10.6 G/DL (ref 12–16)
IMM GRANULOCYTES # BLD AUTO: 0.04 K/UL (ref 0–0.04)
IMM GRANULOCYTES NFR BLD AUTO: 0.4 % (ref 0–0.5)
LYMPHOCYTES # BLD AUTO: 1.6 K/UL (ref 1–4.8)
LYMPHOCYTES NFR BLD: 17.1 % (ref 18–48)
MCH RBC QN AUTO: 30.4 PG (ref 27–31)
MCHC RBC AUTO-ENTMCNC: 30 G/DL (ref 32–36)
MCV RBC AUTO: 101 FL (ref 82–98)
MONOCYTES # BLD AUTO: 0.7 K/UL (ref 0.3–1)
MONOCYTES NFR BLD: 7.4 % (ref 4–15)
NEUTROPHILS # BLD AUTO: 6.7 K/UL (ref 1.8–7.7)
NEUTROPHILS NFR BLD: 71.8 % (ref 38–73)
NRBC BLD-RTO: 0 /100 WBC
PLATELET # BLD AUTO: 501 K/UL (ref 150–350)
PMV BLD AUTO: 10.6 FL (ref 9.2–12.9)
POTASSIUM SERPL-SCNC: 4.7 MMOL/L (ref 3.5–5.1)
PROT SERPL-MCNC: 7.4 G/DL (ref 6–8.4)
RBC # BLD AUTO: 3.49 M/UL (ref 4–5.4)
SODIUM SERPL-SCNC: 136 MMOL/L (ref 136–145)
WBC # BLD AUTO: 9.32 K/UL (ref 3.9–12.7)

## 2021-01-19 PROCEDURE — 85652 RBC SED RATE AUTOMATED: CPT

## 2021-01-19 PROCEDURE — 80053 COMPREHEN METABOLIC PANEL: CPT

## 2021-01-19 PROCEDURE — 86140 C-REACTIVE PROTEIN: CPT

## 2021-01-19 PROCEDURE — 36415 COLL VENOUS BLD VENIPUNCTURE: CPT

## 2021-01-19 PROCEDURE — 85025 COMPLETE CBC W/AUTO DIFF WBC: CPT

## 2021-01-20 ENCOUNTER — OFFICE VISIT (OUTPATIENT)
Dept: ENDOCRINOLOGY | Facility: CLINIC | Age: 56
End: 2021-01-20
Payer: MEDICARE

## 2021-01-20 VITALS
WEIGHT: 189.63 LBS | TEMPERATURE: 98 F | SYSTOLIC BLOOD PRESSURE: 130 MMHG | DIASTOLIC BLOOD PRESSURE: 76 MMHG | HEART RATE: 80 BPM | BODY MASS INDEX: 31.55 KG/M2

## 2021-01-20 DIAGNOSIS — R73.09 HEMOGLOBIN A1C GREATER THAN 9%, INDICATING POOR DIABETIC CONTROL: ICD-10-CM

## 2021-01-20 DIAGNOSIS — L97.426 DIABETIC ULCER OF LEFT HEEL ASSOCIATED WITH TYPE 2 DIABETES MELLITUS, WITH BONE INVOLVEMENT WITHOUT EVIDENCE OF NECROSIS: ICD-10-CM

## 2021-01-20 DIAGNOSIS — E11.65 TYPE 2 DIABETES MELLITUS WITH HYPERGLYCEMIA, WITH LONG-TERM CURRENT USE OF INSULIN: ICD-10-CM

## 2021-01-20 DIAGNOSIS — I50.42 CHRONIC COMBINED SYSTOLIC AND DIASTOLIC HEART FAILURE: ICD-10-CM

## 2021-01-20 DIAGNOSIS — E11.65 UNCONTROLLED TYPE 2 DIABETES MELLITUS WITH HYPERGLYCEMIA: Primary | ICD-10-CM

## 2021-01-20 DIAGNOSIS — N18.32 CHRONIC RENAL FAILURE, STAGE 3B: ICD-10-CM

## 2021-01-20 DIAGNOSIS — Z79.4 TYPE 2 DIABETES MELLITUS WITH HYPERGLYCEMIA, WITH LONG-TERM CURRENT USE OF INSULIN: ICD-10-CM

## 2021-01-20 DIAGNOSIS — E11.621 DIABETIC ULCER OF LEFT HEEL ASSOCIATED WITH TYPE 2 DIABETES MELLITUS, WITH BONE INVOLVEMENT WITHOUT EVIDENCE OF NECROSIS: ICD-10-CM

## 2021-01-20 PROBLEM — S91.302A NON HEALING LEFT HEEL WOUND: Status: RESOLVED | Noted: 2020-07-17 | Resolved: 2021-01-20

## 2021-01-20 PROBLEM — D62 ACUTE BLOOD LOSS ANEMIA: Status: RESOLVED | Noted: 2020-01-27 | Resolved: 2021-01-20

## 2021-01-20 PROBLEM — E87.6 HYPOKALEMIA DUE TO EXCESSIVE GASTROINTESTINAL LOSS OF POTASSIUM: Status: RESOLVED | Noted: 2018-05-05 | Resolved: 2021-01-20

## 2021-01-20 PROBLEM — S37.009A: Status: RESOLVED | Noted: 2018-06-01 | Resolved: 2021-01-20

## 2021-01-20 PROCEDURE — 99214 OFFICE O/P EST MOD 30 MIN: CPT | Mod: PBBFAC,PN | Performed by: HOSPITALIST

## 2021-01-20 PROCEDURE — 99999 PR PBB SHADOW E&M-EST. PATIENT-LVL IV: ICD-10-PCS | Mod: PBBFAC,,, | Performed by: HOSPITALIST

## 2021-01-20 PROCEDURE — 99214 PR OFFICE/OUTPT VISIT, EST, LEVL IV, 30-39 MIN: ICD-10-PCS | Mod: S$PBB,,, | Performed by: HOSPITALIST

## 2021-01-20 PROCEDURE — 99214 OFFICE O/P EST MOD 30 MIN: CPT | Mod: S$PBB,,, | Performed by: HOSPITALIST

## 2021-01-20 PROCEDURE — 99999 PR PBB SHADOW E&M-EST. PATIENT-LVL IV: CPT | Mod: PBBFAC,,, | Performed by: HOSPITALIST

## 2021-01-20 RX ORDER — INSULIN GLARGINE 100 [IU]/ML
15 INJECTION, SOLUTION SUBCUTANEOUS NIGHTLY
Qty: 1 BOX | Refills: 11 | Status: ON HOLD | OUTPATIENT
Start: 2021-01-20 | End: 2021-06-08 | Stop reason: HOSPADM

## 2021-01-20 RX ORDER — INSULIN ASPART 100 [IU]/ML
8 INJECTION, SOLUTION INTRAVENOUS; SUBCUTANEOUS
Qty: 1 BOX | Refills: 11 | Status: SHIPPED | OUTPATIENT
Start: 2021-01-20 | End: 2021-04-13

## 2021-01-21 ENCOUNTER — OFFICE VISIT (OUTPATIENT)
Dept: NEPHROLOGY | Facility: CLINIC | Age: 56
End: 2021-01-21
Payer: MEDICARE

## 2021-01-21 VITALS
HEART RATE: 89 BPM | WEIGHT: 189.81 LBS | HEIGHT: 65 IN | SYSTOLIC BLOOD PRESSURE: 110 MMHG | BODY MASS INDEX: 31.63 KG/M2 | DIASTOLIC BLOOD PRESSURE: 80 MMHG | OXYGEN SATURATION: 98 %

## 2021-01-21 DIAGNOSIS — N18.32 STAGE 3B CHRONIC KIDNEY DISEASE: ICD-10-CM

## 2021-01-21 DIAGNOSIS — R80.9 PROTEINURIA, UNSPECIFIED TYPE: Primary | ICD-10-CM

## 2021-01-21 DIAGNOSIS — D64.9 ANEMIA, UNSPECIFIED TYPE: ICD-10-CM

## 2021-01-21 PROCEDURE — 99215 OFFICE O/P EST HI 40 MIN: CPT | Mod: PBBFAC | Performed by: HOSPITALIST

## 2021-01-21 PROCEDURE — 99999 PR PBB SHADOW E&M-EST. PATIENT-LVL V: CPT | Mod: PBBFAC,GC,, | Performed by: HOSPITALIST

## 2021-01-21 PROCEDURE — 99999 PR PBB SHADOW E&M-EST. PATIENT-LVL V: ICD-10-PCS | Mod: PBBFAC,GC,, | Performed by: HOSPITALIST

## 2021-01-21 RX ORDER — SODIUM BICARBONATE 650 MG/1
650 TABLET ORAL 2 TIMES DAILY
Qty: 120 TABLET | Refills: 3 | COMMUNITY
Start: 2021-01-21 | End: 2021-02-15

## 2021-01-21 RX ORDER — LOSARTAN POTASSIUM 25 MG/1
50 TABLET ORAL DAILY
Qty: 30 TABLET | Refills: 0 | Status: SHIPPED | OUTPATIENT
Start: 2021-01-21 | End: 2021-02-24

## 2021-01-22 ENCOUNTER — HOSPITAL ENCOUNTER (OUTPATIENT)
Dept: WOUND CARE | Facility: HOSPITAL | Age: 56
Discharge: HOME | End: 2021-01-22
Attending: PODIATRIST
Payer: MEDICARE

## 2021-01-22 VITALS
RESPIRATION RATE: 18 BRPM | TEMPERATURE: 98 F | HEART RATE: 85 BPM | DIASTOLIC BLOOD PRESSURE: 57 MMHG | SYSTOLIC BLOOD PRESSURE: 112 MMHG

## 2021-01-22 DIAGNOSIS — L97.426 DIABETIC ULCER OF LEFT HEEL ASSOCIATED WITH TYPE 2 DIABETES MELLITUS, WITH BONE INVOLVEMENT WITHOUT EVIDENCE OF NECROSIS: Primary | ICD-10-CM

## 2021-01-22 DIAGNOSIS — E11.621 DIABETIC ULCER OF LEFT HEEL ASSOCIATED WITH TYPE 2 DIABETES MELLITUS, WITH BONE INVOLVEMENT WITHOUT EVIDENCE OF NECROSIS: Primary | ICD-10-CM

## 2021-01-22 PROCEDURE — 99499 NO LOS: ICD-10-PCS | Mod: ,,, | Performed by: PODIATRIST

## 2021-01-22 PROCEDURE — 99499 UNLISTED E&M SERVICE: CPT | Mod: ,,, | Performed by: PODIATRIST

## 2021-01-22 PROCEDURE — 27201912 HC WOUND CARE DEBRIDEMENT SUPPLIES: Performed by: PODIATRIST

## 2021-01-22 PROCEDURE — 11042 DBRDMT SUBQ TIS 1ST 20SQCM/<: CPT | Performed by: PODIATRIST

## 2021-01-22 PROCEDURE — 11042 WOUND DEBRIDEMENT: ICD-10-PCS | Mod: ,,, | Performed by: PODIATRIST

## 2021-01-22 PROCEDURE — 11042 DBRDMT SUBQ TIS 1ST 20SQCM/<: CPT | Mod: ,,, | Performed by: PODIATRIST

## 2021-01-22 RX ORDER — OXYCODONE AND ACETAMINOPHEN 5; 325 MG/1; MG/1
1 TABLET ORAL EVERY 6 HOURS PRN
Qty: 28 TABLET | Refills: 0 | Status: SHIPPED | OUTPATIENT
Start: 2021-01-22 | End: 2021-01-29 | Stop reason: SDUPTHER

## 2021-01-26 ENCOUNTER — OFFICE VISIT (OUTPATIENT)
Dept: INFECTIOUS DISEASES | Facility: CLINIC | Age: 56
End: 2021-01-26
Payer: MEDICARE

## 2021-01-26 VITALS
HEART RATE: 83 BPM | TEMPERATURE: 98 F | SYSTOLIC BLOOD PRESSURE: 141 MMHG | BODY MASS INDEX: 31.65 KG/M2 | DIASTOLIC BLOOD PRESSURE: 75 MMHG | HEIGHT: 65 IN | WEIGHT: 190 LBS

## 2021-01-26 DIAGNOSIS — M86.172 ACUTE OSTEOMYELITIS OF LEFT CALCANEUS: Primary | ICD-10-CM

## 2021-01-26 PROCEDURE — 99999 PR PBB SHADOW E&M-EST. PATIENT-LVL IV: ICD-10-PCS | Mod: PBBFAC,GC,, | Performed by: STUDENT IN AN ORGANIZED HEALTH CARE EDUCATION/TRAINING PROGRAM

## 2021-01-26 PROCEDURE — 99213 OFFICE O/P EST LOW 20 MIN: CPT | Mod: S$PBB,GC,, | Performed by: STUDENT IN AN ORGANIZED HEALTH CARE EDUCATION/TRAINING PROGRAM

## 2021-01-26 PROCEDURE — 99999 PR PBB SHADOW E&M-EST. PATIENT-LVL IV: CPT | Mod: PBBFAC,GC,, | Performed by: STUDENT IN AN ORGANIZED HEALTH CARE EDUCATION/TRAINING PROGRAM

## 2021-01-26 PROCEDURE — 99213 PR OFFICE/OUTPT VISIT, EST, LEVL III, 20-29 MIN: ICD-10-PCS | Mod: S$PBB,GC,, | Performed by: STUDENT IN AN ORGANIZED HEALTH CARE EDUCATION/TRAINING PROGRAM

## 2021-01-26 PROCEDURE — 99214 OFFICE O/P EST MOD 30 MIN: CPT | Mod: PBBFAC | Performed by: STUDENT IN AN ORGANIZED HEALTH CARE EDUCATION/TRAINING PROGRAM

## 2021-01-26 RX ORDER — LEVOFLOXACIN 750 MG/1
750 TABLET ORAL EVERY OTHER DAY
Qty: 7 TABLET | Refills: 0 | Status: SHIPPED | OUTPATIENT
Start: 2021-01-26 | End: 2021-02-09 | Stop reason: SDUPTHER

## 2021-01-29 ENCOUNTER — HOSPITAL ENCOUNTER (OUTPATIENT)
Dept: WOUND CARE | Facility: HOSPITAL | Age: 56
Discharge: HOME | End: 2021-01-29
Attending: PODIATRIST
Payer: MEDICARE

## 2021-01-29 VITALS
DIASTOLIC BLOOD PRESSURE: 73 MMHG | HEART RATE: 89 BPM | SYSTOLIC BLOOD PRESSURE: 145 MMHG | RESPIRATION RATE: 18 BRPM | TEMPERATURE: 97 F

## 2021-01-29 DIAGNOSIS — E11.621 DIABETIC ULCER OF LEFT HEEL ASSOCIATED WITH TYPE 2 DIABETES MELLITUS, WITH BONE INVOLVEMENT WITHOUT EVIDENCE OF NECROSIS: Primary | ICD-10-CM

## 2021-01-29 DIAGNOSIS — L97.426 DIABETIC ULCER OF LEFT HEEL ASSOCIATED WITH TYPE 2 DIABETES MELLITUS, WITH BONE INVOLVEMENT WITHOUT EVIDENCE OF NECROSIS: Primary | ICD-10-CM

## 2021-01-29 PROCEDURE — 11042 DBRDMT SUBQ TIS 1ST 20SQCM/<: CPT | Mod: ,,, | Performed by: PODIATRIST

## 2021-01-29 PROCEDURE — 99499 UNLISTED E&M SERVICE: CPT | Mod: ,,, | Performed by: PODIATRIST

## 2021-01-29 PROCEDURE — 99499 NO LOS: ICD-10-PCS | Mod: ,,, | Performed by: PODIATRIST

## 2021-01-29 PROCEDURE — 11042 DBRDMT SUBQ TIS 1ST 20SQCM/<: CPT | Performed by: PODIATRIST

## 2021-01-29 PROCEDURE — 11042 WOUND DEBRIDEMENT: ICD-10-PCS | Mod: ,,, | Performed by: PODIATRIST

## 2021-01-29 PROCEDURE — 27201912 HC WOUND CARE DEBRIDEMENT SUPPLIES: Performed by: PODIATRIST

## 2021-01-29 RX ORDER — OXYCODONE AND ACETAMINOPHEN 5; 325 MG/1; MG/1
1 TABLET ORAL EVERY 6 HOURS PRN
Qty: 28 TABLET | Refills: 0 | Status: SHIPPED | OUTPATIENT
Start: 2021-01-29 | End: 2021-02-27

## 2021-02-02 ENCOUNTER — TELEPHONE (OUTPATIENT)
Dept: ENDOCRINOLOGY | Facility: CLINIC | Age: 56
End: 2021-02-02

## 2021-02-05 ENCOUNTER — HOSPITAL ENCOUNTER (OUTPATIENT)
Dept: WOUND CARE | Facility: HOSPITAL | Age: 56
Discharge: HOME | End: 2021-02-05
Attending: PODIATRIST
Payer: MEDICARE

## 2021-02-05 VITALS
TEMPERATURE: 97 F | DIASTOLIC BLOOD PRESSURE: 74 MMHG | WEIGHT: 190.06 LBS | HEART RATE: 92 BPM | SYSTOLIC BLOOD PRESSURE: 165 MMHG | HEIGHT: 65 IN | BODY MASS INDEX: 31.67 KG/M2

## 2021-02-05 DIAGNOSIS — E11.621 DIABETIC ULCER OF LEFT HEEL ASSOCIATED WITH TYPE 2 DIABETES MELLITUS, WITH BONE INVOLVEMENT WITHOUT EVIDENCE OF NECROSIS: Primary | ICD-10-CM

## 2021-02-05 DIAGNOSIS — M86.172 ACUTE OSTEOMYELITIS OF LEFT CALCANEUS: ICD-10-CM

## 2021-02-05 DIAGNOSIS — L97.426 DIABETIC ULCER OF LEFT HEEL ASSOCIATED WITH TYPE 2 DIABETES MELLITUS, WITH BONE INVOLVEMENT WITHOUT EVIDENCE OF NECROSIS: Primary | ICD-10-CM

## 2021-02-05 PROCEDURE — 11044 DBRDMT BONE 1ST 20 SQ CM/<: CPT | Mod: ,,, | Performed by: PODIATRIST

## 2021-02-05 PROCEDURE — 99499 NO LOS: ICD-10-PCS | Mod: ,,, | Performed by: PODIATRIST

## 2021-02-05 PROCEDURE — 88305 TISSUE EXAM BY PATHOLOGIST: ICD-10-PCS | Mod: 26,,, | Performed by: PATHOLOGY

## 2021-02-05 PROCEDURE — 88311 DECALCIFY TISSUE: CPT | Performed by: PATHOLOGY

## 2021-02-05 PROCEDURE — 88305 TISSUE EXAM BY PATHOLOGIST: CPT | Performed by: PATHOLOGY

## 2021-02-05 PROCEDURE — 88305 TISSUE EXAM BY PATHOLOGIST: CPT | Mod: 26,,, | Performed by: PATHOLOGY

## 2021-02-05 PROCEDURE — 87070 CULTURE OTHR SPECIMN AEROBIC: CPT

## 2021-02-05 PROCEDURE — 99499 UNLISTED E&M SERVICE: CPT | Mod: ,,, | Performed by: PODIATRIST

## 2021-02-05 PROCEDURE — 87205 SMEAR GRAM STAIN: CPT

## 2021-02-05 PROCEDURE — 88311 PR  DECALCIFY TISSUE: ICD-10-PCS | Mod: 26,,, | Performed by: PATHOLOGY

## 2021-02-05 PROCEDURE — 11044 WOUND DEBRIDEMENT: ICD-10-PCS | Mod: ,,, | Performed by: PODIATRIST

## 2021-02-05 PROCEDURE — 11044 DBRDMT BONE 1ST 20 SQ CM/<: CPT | Performed by: PODIATRIST

## 2021-02-05 PROCEDURE — 27201912 HC WOUND CARE DEBRIDEMENT SUPPLIES: Performed by: PODIATRIST

## 2021-02-05 PROCEDURE — 88311 DECALCIFY TISSUE: CPT | Mod: 26,,, | Performed by: PATHOLOGY

## 2021-02-05 PROCEDURE — 87102 FUNGUS ISOLATION CULTURE: CPT

## 2021-02-05 RX ORDER — OXYCODONE AND ACETAMINOPHEN 10; 325 MG/1; MG/1
1 TABLET ORAL EVERY 6 HOURS PRN
Qty: 28 TABLET | Refills: 0 | Status: SHIPPED | OUTPATIENT
Start: 2021-02-05 | End: 2021-02-12 | Stop reason: SDUPTHER

## 2021-02-06 LAB
GRAM STN SPEC: NORMAL
GRAM STN SPEC: NORMAL

## 2021-02-09 ENCOUNTER — OFFICE VISIT (OUTPATIENT)
Dept: INFECTIOUS DISEASES | Facility: CLINIC | Age: 56
End: 2021-02-09
Payer: MEDICARE

## 2021-02-09 VITALS
WEIGHT: 190.06 LBS | SYSTOLIC BLOOD PRESSURE: 126 MMHG | HEIGHT: 65 IN | BODY MASS INDEX: 31.67 KG/M2 | DIASTOLIC BLOOD PRESSURE: 77 MMHG | TEMPERATURE: 98 F

## 2021-02-09 DIAGNOSIS — E11.621 DIABETIC ULCER OF LEFT HEEL ASSOCIATED WITH TYPE 2 DIABETES MELLITUS, WITH BONE INVOLVEMENT WITHOUT EVIDENCE OF NECROSIS: Primary | ICD-10-CM

## 2021-02-09 DIAGNOSIS — L97.426 DIABETIC ULCER OF LEFT HEEL ASSOCIATED WITH TYPE 2 DIABETES MELLITUS, WITH BONE INVOLVEMENT WITHOUT EVIDENCE OF NECROSIS: Primary | ICD-10-CM

## 2021-02-09 LAB — BACTERIA SPEC AEROBE CULT: NO GROWTH

## 2021-02-09 PROCEDURE — 99213 OFFICE O/P EST LOW 20 MIN: CPT | Mod: S$PBB,GC,, | Performed by: INTERNAL MEDICINE

## 2021-02-09 PROCEDURE — 99213 OFFICE O/P EST LOW 20 MIN: CPT | Mod: PBBFAC | Performed by: INTERNAL MEDICINE

## 2021-02-09 PROCEDURE — 99999 PR PBB SHADOW E&M-EST. PATIENT-LVL III: ICD-10-PCS | Mod: PBBFAC,GC,, | Performed by: INTERNAL MEDICINE

## 2021-02-09 PROCEDURE — 99999 PR PBB SHADOW E&M-EST. PATIENT-LVL III: CPT | Mod: PBBFAC,GC,, | Performed by: INTERNAL MEDICINE

## 2021-02-09 PROCEDURE — 99213 PR OFFICE/OUTPT VISIT, EST, LEVL III, 20-29 MIN: ICD-10-PCS | Mod: S$PBB,GC,, | Performed by: INTERNAL MEDICINE

## 2021-02-09 RX ORDER — LEVOFLOXACIN 750 MG/1
750 TABLET ORAL EVERY OTHER DAY
Qty: 7 TABLET | Refills: 0 | Status: SHIPPED | OUTPATIENT
Start: 2021-02-09 | End: 2021-02-24

## 2021-02-10 ENCOUNTER — TELEPHONE (OUTPATIENT)
Dept: INTERNAL MEDICINE | Facility: CLINIC | Age: 56
End: 2021-02-10

## 2021-02-10 ENCOUNTER — OFFICE VISIT (OUTPATIENT)
Dept: INTERNAL MEDICINE | Facility: CLINIC | Age: 56
End: 2021-02-10
Payer: MEDICARE

## 2021-02-10 ENCOUNTER — LAB VISIT (OUTPATIENT)
Dept: LAB | Facility: HOSPITAL | Age: 56
End: 2021-02-10
Attending: HOSPITALIST
Payer: MEDICARE

## 2021-02-10 VITALS
HEART RATE: 87 BPM | OXYGEN SATURATION: 99 % | BODY MASS INDEX: 31.96 KG/M2 | HEIGHT: 65 IN | SYSTOLIC BLOOD PRESSURE: 122 MMHG | WEIGHT: 191.81 LBS | DIASTOLIC BLOOD PRESSURE: 88 MMHG

## 2021-02-10 DIAGNOSIS — E11.9 TYPE 2 DIABETES MELLITUS WITHOUT COMPLICATION, WITH LONG-TERM CURRENT USE OF INSULIN: ICD-10-CM

## 2021-02-10 DIAGNOSIS — I10 ESSENTIAL HYPERTENSION: ICD-10-CM

## 2021-02-10 DIAGNOSIS — I42.0 DILATED CARDIOMYOPATHY: ICD-10-CM

## 2021-02-10 DIAGNOSIS — N18.32 STAGE 3B CHRONIC KIDNEY DISEASE: ICD-10-CM

## 2021-02-10 DIAGNOSIS — Z79.4 TYPE 2 DIABETES MELLITUS WITHOUT COMPLICATION, WITH LONG-TERM CURRENT USE OF INSULIN: ICD-10-CM

## 2021-02-10 DIAGNOSIS — R68.89 OTHER GENERAL SYMPTOMS AND SIGNS: ICD-10-CM

## 2021-02-10 DIAGNOSIS — I50.42 CHRONIC COMBINED SYSTOLIC AND DIASTOLIC HEART FAILURE: ICD-10-CM

## 2021-02-10 DIAGNOSIS — Z00.00 PREVENTATIVE HEALTH CARE: Primary | ICD-10-CM

## 2021-02-10 DIAGNOSIS — E11.621 DIABETIC ULCER OF LEFT HEEL ASSOCIATED WITH TYPE 2 DIABETES MELLITUS, WITH BONE INVOLVEMENT WITHOUT EVIDENCE OF NECROSIS: ICD-10-CM

## 2021-02-10 DIAGNOSIS — I48.0 PAROXYSMAL ATRIAL FIBRILLATION: ICD-10-CM

## 2021-02-10 DIAGNOSIS — E78.2 MIXED HYPERLIPIDEMIA: ICD-10-CM

## 2021-02-10 DIAGNOSIS — L97.426 DIABETIC ULCER OF LEFT HEEL ASSOCIATED WITH TYPE 2 DIABETES MELLITUS, WITH BONE INVOLVEMENT WITHOUT EVIDENCE OF NECROSIS: ICD-10-CM

## 2021-02-10 DIAGNOSIS — Z12.31 ENCOUNTER FOR SCREENING MAMMOGRAM FOR MALIGNANT NEOPLASM OF BREAST: ICD-10-CM

## 2021-02-10 DIAGNOSIS — F33.1 MDD (MAJOR DEPRESSIVE DISORDER), RECURRENT EPISODE, MODERATE: Chronic | ICD-10-CM

## 2021-02-10 DIAGNOSIS — E55.9 VITAMIN D DEFICIENCY, UNSPECIFIED: ICD-10-CM

## 2021-02-10 DIAGNOSIS — Z00.00 PREVENTATIVE HEALTH CARE: ICD-10-CM

## 2021-02-10 DIAGNOSIS — I73.9 PAD (PERIPHERAL ARTERY DISEASE): ICD-10-CM

## 2021-02-10 DIAGNOSIS — I73.9 PERIPHERAL VASCULAR DISEASE: ICD-10-CM

## 2021-02-10 DIAGNOSIS — R79.9 ABNORMAL FINDING OF BLOOD CHEMISTRY, UNSPECIFIED: ICD-10-CM

## 2021-02-10 PROCEDURE — 99214 PR OFFICE/OUTPT VISIT, EST, LEVL IV, 30-39 MIN: ICD-10-PCS | Mod: S$PBB,,, | Performed by: NURSE PRACTITIONER

## 2021-02-10 PROCEDURE — 99215 OFFICE O/P EST HI 40 MIN: CPT | Mod: PBBFAC | Performed by: NURSE PRACTITIONER

## 2021-02-10 PROCEDURE — 99214 OFFICE O/P EST MOD 30 MIN: CPT | Mod: S$PBB,,, | Performed by: NURSE PRACTITIONER

## 2021-02-10 PROCEDURE — 99999 PR PBB SHADOW E&M-EST. PATIENT-LVL V: ICD-10-PCS | Mod: PBBFAC,,, | Performed by: NURSE PRACTITIONER

## 2021-02-10 PROCEDURE — 99999 PR PBB SHADOW E&M-EST. PATIENT-LVL V: CPT | Mod: PBBFAC,,, | Performed by: NURSE PRACTITIONER

## 2021-02-11 ENCOUNTER — LAB VISIT (OUTPATIENT)
Dept: LAB | Facility: HOSPITAL | Age: 56
End: 2021-02-11
Attending: NURSE PRACTITIONER
Payer: MEDICARE

## 2021-02-11 DIAGNOSIS — Z00.00 PREVENTATIVE HEALTH CARE: ICD-10-CM

## 2021-02-11 DIAGNOSIS — R68.89 OTHER GENERAL SYMPTOMS AND SIGNS: ICD-10-CM

## 2021-02-11 DIAGNOSIS — R79.9 ABNORMAL FINDING OF BLOOD CHEMISTRY, UNSPECIFIED: ICD-10-CM

## 2021-02-11 DIAGNOSIS — N18.32 STAGE 3B CHRONIC KIDNEY DISEASE: ICD-10-CM

## 2021-02-11 DIAGNOSIS — I21.9 ACUTE MYOCARDIAL INFARCTION, UNSPECIFIED: ICD-10-CM

## 2021-02-11 DIAGNOSIS — F41.9 ANXIETY DISORDER, UNSPECIFIED: ICD-10-CM

## 2021-02-11 DIAGNOSIS — E55.9 VITAMIN D DEFICIENCY, UNSPECIFIED: ICD-10-CM

## 2021-02-11 LAB
25(OH)D3+25(OH)D2 SERPL-MCNC: 5 NG/ML (ref 30–96)
25(OH)D3+25(OH)D2 SERPL-MCNC: 5 NG/ML (ref 30–96)
ALBUMIN SERPL BCP-MCNC: 2.2 G/DL (ref 3.5–5.2)
ALP SERPL-CCNC: 97 U/L (ref 55–135)
ALT SERPL W/O P-5'-P-CCNC: 5 U/L (ref 10–44)
ANION GAP SERPL CALC-SCNC: 8 MMOL/L (ref 8–16)
ANISOCYTOSIS BLD QL SMEAR: SLIGHT
AST SERPL-CCNC: 12 U/L (ref 10–40)
BASOPHILS # BLD AUTO: 0.03 K/UL (ref 0–0.2)
BASOPHILS NFR BLD: 0.3 % (ref 0–1.9)
BILIRUB SERPL-MCNC: 0.2 MG/DL (ref 0.1–1)
BUN SERPL-MCNC: 27 MG/DL (ref 6–20)
CALCIUM SERPL-MCNC: 9 MG/DL (ref 8.7–10.5)
CHLORIDE SERPL-SCNC: 109 MMOL/L (ref 95–110)
CHOLEST SERPL-MCNC: 165 MG/DL (ref 120–199)
CHOLEST SERPL-MCNC: 165 MG/DL (ref 120–199)
CHOLEST/HDLC SERPL: 5.3 {RATIO} (ref 2–5)
CHOLEST/HDLC SERPL: 5.3 {RATIO} (ref 2–5)
CO2 SERPL-SCNC: 21 MMOL/L (ref 23–29)
CREAT SERPL-MCNC: 1.8 MG/DL (ref 0.5–1.4)
CRP SERPL-MCNC: 42 MG/L (ref 0–8.2)
DIFFERENTIAL METHOD: ABNORMAL
EOSINOPHIL # BLD AUTO: 0.4 K/UL (ref 0–0.5)
EOSINOPHIL NFR BLD: 3.6 % (ref 0–8)
ERYTHROCYTE [DISTWIDTH] IN BLOOD BY AUTOMATED COUNT: 14.3 % (ref 11.5–14.5)
ERYTHROCYTE [SEDIMENTATION RATE] IN BLOOD BY WESTERGREN METHOD: >120 MM/HR (ref 0–36)
EST. GFR  (AFRICAN AMERICAN): 36.3 ML/MIN/1.73 M^2
EST. GFR  (NON AFRICAN AMERICAN): 31.5 ML/MIN/1.73 M^2
GLUCOSE SERPL-MCNC: 106 MG/DL (ref 70–110)
HCT VFR BLD AUTO: 31.5 % (ref 37–48.5)
HDLC SERPL-MCNC: 31 MG/DL (ref 40–75)
HDLC SERPL-MCNC: 31 MG/DL (ref 40–75)
HDLC SERPL: 18.8 % (ref 20–50)
HDLC SERPL: 18.8 % (ref 20–50)
HGB BLD-MCNC: 9.6 G/DL (ref 12–16)
IMM GRANULOCYTES # BLD AUTO: 0.02 K/UL (ref 0–0.04)
IMM GRANULOCYTES NFR BLD AUTO: 0.2 % (ref 0–0.5)
LDLC SERPL CALC-MCNC: 101 MG/DL (ref 63–159)
LDLC SERPL CALC-MCNC: 101 MG/DL (ref 63–159)
LYMPHOCYTES # BLD AUTO: 1.7 K/UL (ref 1–4.8)
LYMPHOCYTES NFR BLD: 17.1 % (ref 18–48)
MCH RBC QN AUTO: 30.5 PG (ref 27–31)
MCHC RBC AUTO-ENTMCNC: 30.5 G/DL (ref 32–36)
MCV RBC AUTO: 100 FL (ref 82–98)
MONOCYTES # BLD AUTO: 0.9 K/UL (ref 0.3–1)
MONOCYTES NFR BLD: 8.5 % (ref 4–15)
NEUTROPHILS # BLD AUTO: 7.1 K/UL (ref 1.8–7.7)
NEUTROPHILS NFR BLD: 70.3 % (ref 38–73)
NONHDLC SERPL-MCNC: 134 MG/DL
NONHDLC SERPL-MCNC: 134 MG/DL
NRBC BLD-RTO: 0 /100 WBC
PLATELET # BLD AUTO: 545 K/UL (ref 150–350)
PLATELET BLD QL SMEAR: ABNORMAL
PMV BLD AUTO: 10 FL (ref 9.2–12.9)
POTASSIUM SERPL-SCNC: 4.6 MMOL/L (ref 3.5–5.1)
PROT SERPL-MCNC: 7.9 G/DL (ref 6–8.4)
RBC # BLD AUTO: 3.15 M/UL (ref 4–5.4)
SODIUM SERPL-SCNC: 138 MMOL/L (ref 136–145)
TRIGL SERPL-MCNC: 165 MG/DL (ref 30–150)
TRIGL SERPL-MCNC: 165 MG/DL (ref 30–150)
TSH SERPL DL<=0.005 MIU/L-ACNC: 0.91 UIU/ML (ref 0.4–4)
TSH SERPL DL<=0.005 MIU/L-ACNC: 0.91 UIU/ML (ref 0.4–4)
WBC # BLD AUTO: 10.14 K/UL (ref 3.9–12.7)

## 2021-02-11 PROCEDURE — 85652 RBC SED RATE AUTOMATED: CPT

## 2021-02-11 PROCEDURE — 86140 C-REACTIVE PROTEIN: CPT

## 2021-02-11 PROCEDURE — 86705 HEP B CORE ANTIBODY IGM: CPT

## 2021-02-11 PROCEDURE — 86803 HEPATITIS C AB TEST: CPT

## 2021-02-11 PROCEDURE — 36415 COLL VENOUS BLD VENIPUNCTURE: CPT

## 2021-02-11 PROCEDURE — 85025 COMPLETE CBC W/AUTO DIFF WBC: CPT

## 2021-02-11 PROCEDURE — 80061 LIPID PANEL: CPT

## 2021-02-11 PROCEDURE — 84443 ASSAY THYROID STIM HORMONE: CPT

## 2021-02-11 PROCEDURE — 82306 VITAMIN D 25 HYDROXY: CPT

## 2021-02-11 PROCEDURE — 80053 COMPREHEN METABOLIC PANEL: CPT

## 2021-02-12 ENCOUNTER — HOSPITAL ENCOUNTER (OUTPATIENT)
Dept: WOUND CARE | Facility: HOSPITAL | Age: 56
Discharge: HOME | End: 2021-02-12
Attending: PODIATRIST
Payer: MEDICARE

## 2021-02-12 ENCOUNTER — TELEPHONE (OUTPATIENT)
Dept: INTERNAL MEDICINE | Facility: CLINIC | Age: 56
End: 2021-02-12

## 2021-02-12 VITALS — HEART RATE: 80 BPM | TEMPERATURE: 97 F | DIASTOLIC BLOOD PRESSURE: 76 MMHG | SYSTOLIC BLOOD PRESSURE: 150 MMHG

## 2021-02-12 DIAGNOSIS — L97.426 DIABETIC ULCER OF LEFT HEEL ASSOCIATED WITH TYPE 2 DIABETES MELLITUS, WITH BONE INVOLVEMENT WITHOUT EVIDENCE OF NECROSIS: Primary | ICD-10-CM

## 2021-02-12 DIAGNOSIS — E11.621 DIABETIC ULCER OF LEFT HEEL ASSOCIATED WITH TYPE 2 DIABETES MELLITUS, WITH BONE INVOLVEMENT WITHOUT EVIDENCE OF NECROSIS: Primary | ICD-10-CM

## 2021-02-12 LAB
HBV CORE IGM SERPL QL IA: NEGATIVE
HCV AB SERPL QL IA: NEGATIVE

## 2021-02-12 PROCEDURE — 11042 WOUND DEBRIDEMENT: ICD-10-PCS | Mod: ,,, | Performed by: PODIATRIST

## 2021-02-12 PROCEDURE — 27201912 HC WOUND CARE DEBRIDEMENT SUPPLIES: Performed by: PODIATRIST

## 2021-02-12 PROCEDURE — 11042 DBRDMT SUBQ TIS 1ST 20SQCM/<: CPT | Performed by: PODIATRIST

## 2021-02-12 PROCEDURE — 99499 UNLISTED E&M SERVICE: CPT | Mod: ,,, | Performed by: PODIATRIST

## 2021-02-12 PROCEDURE — 99499 NO LOS: ICD-10-PCS | Mod: ,,, | Performed by: PODIATRIST

## 2021-02-12 PROCEDURE — 11042 DBRDMT SUBQ TIS 1ST 20SQCM/<: CPT | Mod: ,,, | Performed by: PODIATRIST

## 2021-02-12 RX ORDER — ERGOCALCIFEROL 1.25 MG/1
50000 CAPSULE ORAL
Qty: 12 CAPSULE | Refills: 1 | Status: ON HOLD | OUTPATIENT
Start: 2021-02-12 | End: 2021-06-08 | Stop reason: HOSPADM

## 2021-02-12 RX ORDER — OXYCODONE AND ACETAMINOPHEN 10; 325 MG/1; MG/1
1 TABLET ORAL EVERY 6 HOURS PRN
Qty: 28 TABLET | Refills: 0 | Status: SHIPPED | OUTPATIENT
Start: 2021-02-12 | End: 2021-02-19 | Stop reason: SDUPTHER

## 2021-02-15 DIAGNOSIS — Z79.4 TYPE 2 DIABETES MELLITUS WITH HYPERGLYCEMIA, WITH LONG-TERM CURRENT USE OF INSULIN: ICD-10-CM

## 2021-02-15 DIAGNOSIS — R80.9 PROTEINURIA, UNSPECIFIED TYPE: ICD-10-CM

## 2021-02-15 DIAGNOSIS — R80.9 NEPHROTIC RANGE PROTEINURIA: ICD-10-CM

## 2021-02-15 DIAGNOSIS — N18.32 STAGE 3B CHRONIC KIDNEY DISEASE: Primary | ICD-10-CM

## 2021-02-15 DIAGNOSIS — E11.65 TYPE 2 DIABETES MELLITUS WITH HYPERGLYCEMIA, WITH LONG-TERM CURRENT USE OF INSULIN: ICD-10-CM

## 2021-02-15 DIAGNOSIS — I10 ESSENTIAL HYPERTENSION: ICD-10-CM

## 2021-02-15 LAB
FINAL PATHOLOGIC DIAGNOSIS: NORMAL
GROSS: NORMAL

## 2021-02-17 ENCOUNTER — TELEPHONE (OUTPATIENT)
Dept: VASCULAR SURGERY | Facility: CLINIC | Age: 56
End: 2021-02-17

## 2021-02-17 ENCOUNTER — TELEPHONE (OUTPATIENT)
Dept: PAIN MEDICINE | Facility: CLINIC | Age: 56
End: 2021-02-17

## 2021-02-17 DIAGNOSIS — I70.344: Primary | ICD-10-CM

## 2021-02-17 DIAGNOSIS — I70.229 CRITICAL LOWER LIMB ISCHEMIA: Primary | ICD-10-CM

## 2021-02-17 DIAGNOSIS — I70.344: ICD-10-CM

## 2021-02-19 ENCOUNTER — HOSPITAL ENCOUNTER (OUTPATIENT)
Dept: WOUND CARE | Facility: HOSPITAL | Age: 56
Discharge: HOME | End: 2021-02-19
Attending: PODIATRIST
Payer: MEDICARE

## 2021-02-19 VITALS — SYSTOLIC BLOOD PRESSURE: 122 MMHG | TEMPERATURE: 98 F | HEART RATE: 76 BPM | DIASTOLIC BLOOD PRESSURE: 70 MMHG

## 2021-02-19 DIAGNOSIS — L97.426 DIABETIC ULCER OF LEFT HEEL ASSOCIATED WITH TYPE 2 DIABETES MELLITUS, WITH BONE INVOLVEMENT WITHOUT EVIDENCE OF NECROSIS: Primary | ICD-10-CM

## 2021-02-19 DIAGNOSIS — E11.621 DIABETIC ULCER OF LEFT HEEL ASSOCIATED WITH TYPE 2 DIABETES MELLITUS, WITH BONE INVOLVEMENT WITHOUT EVIDENCE OF NECROSIS: Primary | ICD-10-CM

## 2021-02-19 PROCEDURE — 11042 DBRDMT SUBQ TIS 1ST 20SQCM/<: CPT | Mod: ,,, | Performed by: PODIATRIST

## 2021-02-19 PROCEDURE — 11042 WOUND DEBRIDEMENT: ICD-10-PCS | Mod: ,,, | Performed by: PODIATRIST

## 2021-02-19 PROCEDURE — 27201912 HC WOUND CARE DEBRIDEMENT SUPPLIES: Performed by: PODIATRIST

## 2021-02-19 PROCEDURE — 11042 DBRDMT SUBQ TIS 1ST 20SQCM/<: CPT | Performed by: PODIATRIST

## 2021-02-19 RX ORDER — OXYCODONE AND ACETAMINOPHEN 10; 325 MG/1; MG/1
1 TABLET ORAL EVERY 6 HOURS PRN
Qty: 28 TABLET | Refills: 0 | Status: SHIPPED | OUTPATIENT
Start: 2021-02-19 | End: 2021-02-24

## 2021-02-23 ENCOUNTER — OFFICE VISIT (OUTPATIENT)
Dept: INFECTIOUS DISEASES | Facility: CLINIC | Age: 56
End: 2021-02-23
Payer: MEDICARE

## 2021-02-23 VITALS
DIASTOLIC BLOOD PRESSURE: 74 MMHG | TEMPERATURE: 98 F | WEIGHT: 195.13 LBS | SYSTOLIC BLOOD PRESSURE: 136 MMHG | HEIGHT: 66 IN | BODY MASS INDEX: 31.36 KG/M2 | HEART RATE: 77 BPM

## 2021-02-23 DIAGNOSIS — M86.172 ACUTE OSTEOMYELITIS OF LEFT CALCANEUS: Primary | ICD-10-CM

## 2021-02-23 PROCEDURE — 99999 PR PBB SHADOW E&M-EST. PATIENT-LVL IV: ICD-10-PCS | Mod: PBBFAC,,, | Performed by: INTERNAL MEDICINE

## 2021-02-23 PROCEDURE — 99214 OFFICE O/P EST MOD 30 MIN: CPT | Mod: PBBFAC | Performed by: INTERNAL MEDICINE

## 2021-02-23 PROCEDURE — 99215 OFFICE O/P EST HI 40 MIN: CPT | Mod: S$PBB,,, | Performed by: INTERNAL MEDICINE

## 2021-02-23 PROCEDURE — 99999 PR PBB SHADOW E&M-EST. PATIENT-LVL IV: CPT | Mod: PBBFAC,,, | Performed by: INTERNAL MEDICINE

## 2021-02-23 PROCEDURE — 99215 PR OFFICE/OUTPT VISIT, EST, LEVL V, 40-54 MIN: ICD-10-PCS | Mod: S$PBB,,, | Performed by: INTERNAL MEDICINE

## 2021-02-24 ENCOUNTER — HOSPITAL ENCOUNTER (OUTPATIENT)
Dept: RADIOLOGY | Facility: HOSPITAL | Age: 56
Discharge: HOME | End: 2021-02-24
Attending: SURGERY
Payer: MEDICARE

## 2021-02-24 ENCOUNTER — OFFICE VISIT (OUTPATIENT)
Dept: INTERNAL MEDICINE | Facility: CLINIC | Age: 56
End: 2021-02-24
Payer: MEDICARE

## 2021-02-24 VITALS
SYSTOLIC BLOOD PRESSURE: 126 MMHG | OXYGEN SATURATION: 99 % | HEART RATE: 51 BPM | DIASTOLIC BLOOD PRESSURE: 82 MMHG | HEIGHT: 65 IN | BODY MASS INDEX: 32.47 KG/M2

## 2021-02-24 DIAGNOSIS — I10 ESSENTIAL HYPERTENSION: ICD-10-CM

## 2021-02-24 DIAGNOSIS — E55.9 VITAMIN D DEFICIENCY: ICD-10-CM

## 2021-02-24 DIAGNOSIS — I70.344: ICD-10-CM

## 2021-02-24 DIAGNOSIS — N18.32 STAGE 3B CHRONIC KIDNEY DISEASE: ICD-10-CM

## 2021-02-24 DIAGNOSIS — I73.9 PAD (PERIPHERAL ARTERY DISEASE): ICD-10-CM

## 2021-02-24 DIAGNOSIS — E11.65 UNCONTROLLED TYPE 2 DIABETES MELLITUS WITH HYPERGLYCEMIA: ICD-10-CM

## 2021-02-24 DIAGNOSIS — E78.2 MIXED HYPERLIPIDEMIA: ICD-10-CM

## 2021-02-24 DIAGNOSIS — Z00.00 ANNUAL PHYSICAL EXAM: Primary | ICD-10-CM

## 2021-02-24 DIAGNOSIS — I25.10 CORONARY ARTERY DISEASE INVOLVING NATIVE CORONARY ARTERY OF NATIVE HEART WITHOUT ANGINA PECTORIS: ICD-10-CM

## 2021-02-24 DIAGNOSIS — E11.21 TYPE 2 DIABETES MELLITUS WITH DIABETIC NEPHROPATHY, WITH LONG-TERM CURRENT USE OF INSULIN: ICD-10-CM

## 2021-02-24 DIAGNOSIS — Z76.89 ENCOUNTER TO ESTABLISH CARE WITH NEW DOCTOR: ICD-10-CM

## 2021-02-24 DIAGNOSIS — Z79.4 TYPE 2 DIABETES MELLITUS WITH DIABETIC NEPHROPATHY, WITH LONG-TERM CURRENT USE OF INSULIN: ICD-10-CM

## 2021-02-24 DIAGNOSIS — Z79.899 ENCOUNTER FOR LONG-TERM (CURRENT) USE OF OTHER MEDICATIONS: ICD-10-CM

## 2021-02-24 PROCEDURE — 99999 PR PBB SHADOW E&M-EST. PATIENT-LVL V: ICD-10-PCS | Mod: PBBFAC,,, | Performed by: FAMILY MEDICINE

## 2021-02-24 PROCEDURE — 99214 PR OFFICE/OUTPT VISIT, EST, LEVL IV, 30-39 MIN: ICD-10-PCS | Mod: S$PBB,,, | Performed by: FAMILY MEDICINE

## 2021-02-24 PROCEDURE — 93922 UPR/L XTREMITY ART 2 LEVELS: CPT | Mod: 26,,, | Performed by: RADIOLOGY

## 2021-02-24 PROCEDURE — 99999 PR PBB SHADOW E&M-EST. PATIENT-LVL V: CPT | Mod: PBBFAC,,, | Performed by: FAMILY MEDICINE

## 2021-02-24 PROCEDURE — 93922 US ARTERIAL LOWER EXTREMITY LEFT WITH ABI (XPD): ICD-10-PCS | Mod: 26,,, | Performed by: RADIOLOGY

## 2021-02-24 PROCEDURE — 99214 OFFICE O/P EST MOD 30 MIN: CPT | Mod: S$PBB,,, | Performed by: FAMILY MEDICINE

## 2021-02-24 PROCEDURE — 99215 OFFICE O/P EST HI 40 MIN: CPT | Mod: PBBFAC,25 | Performed by: FAMILY MEDICINE

## 2021-02-24 PROCEDURE — 93926 US ARTERIAL LOWER EXTREMITY LEFT WITH ABI (XPD): ICD-10-PCS | Mod: 26,,, | Performed by: RADIOLOGY

## 2021-02-24 PROCEDURE — 93922 UPR/L XTREMITY ART 2 LEVELS: CPT | Mod: TC

## 2021-02-24 PROCEDURE — 93926 LOWER EXTREMITY STUDY: CPT | Mod: 26,,, | Performed by: RADIOLOGY

## 2021-02-24 RX ORDER — LOSARTAN POTASSIUM 50 MG/1
50 TABLET ORAL DAILY
Qty: 90 TABLET | Refills: 3 | Status: ON HOLD | OUTPATIENT
Start: 2021-02-24 | End: 2021-06-08 | Stop reason: HOSPADM

## 2021-02-24 RX ORDER — BLOOD-GLUCOSE CONTROL, NORMAL
1 EACH MISCELLANEOUS 2 TIMES DAILY WITH MEALS
Qty: 100 EACH | Refills: 11 | Status: ON HOLD | OUTPATIENT
Start: 2021-02-24 | End: 2021-06-08 | Stop reason: HOSPADM

## 2021-02-24 RX ORDER — PEN NEEDLE, DIABETIC 30 GX3/16"
NEEDLE, DISPOSABLE MISCELLANEOUS
Qty: 100 EACH | Refills: 11 | Status: ON HOLD | OUTPATIENT
Start: 2021-02-24 | End: 2021-06-08 | Stop reason: HOSPADM

## 2021-02-25 ENCOUNTER — OFFICE VISIT (OUTPATIENT)
Dept: VASCULAR SURGERY | Facility: CLINIC | Age: 56
End: 2021-02-25
Payer: MEDICARE

## 2021-02-25 VITALS
BODY MASS INDEX: 32.32 KG/M2 | DIASTOLIC BLOOD PRESSURE: 70 MMHG | WEIGHT: 194 LBS | SYSTOLIC BLOOD PRESSURE: 122 MMHG | HEIGHT: 65 IN

## 2021-02-25 DIAGNOSIS — I70.229 CRITICAL LOWER LIMB ISCHEMIA: Primary | ICD-10-CM

## 2021-02-25 DIAGNOSIS — I70.262 ATHEROSCLEROSIS OF NATIVE ARTERIES OF EXTREMITIES WITH GANGRENE, LEFT LEG: ICD-10-CM

## 2021-02-25 DIAGNOSIS — I70.249 ATHEROSCLEROSIS OF NATIVE ARTERY OF LEFT LEG WITH ULCERATION: ICD-10-CM

## 2021-02-25 PROCEDURE — 99215 PR OFFICE/OUTPT VISIT, EST, LEVL V, 40-54 MIN: ICD-10-PCS | Mod: S$PBB,,, | Performed by: SURGERY

## 2021-02-25 PROCEDURE — 99999 PR PBB SHADOW E&M-EST. PATIENT-LVL V: ICD-10-PCS | Mod: PBBFAC,,, | Performed by: SURGERY

## 2021-02-25 PROCEDURE — 99999 PR PBB SHADOW E&M-EST. PATIENT-LVL V: CPT | Mod: PBBFAC,,, | Performed by: SURGERY

## 2021-02-25 PROCEDURE — 99215 OFFICE O/P EST HI 40 MIN: CPT | Mod: S$PBB,,, | Performed by: SURGERY

## 2021-02-25 PROCEDURE — 99215 OFFICE O/P EST HI 40 MIN: CPT | Mod: PBBFAC | Performed by: SURGERY

## 2021-02-26 ENCOUNTER — HOSPITAL ENCOUNTER (OUTPATIENT)
Dept: WOUND CARE | Facility: HOSPITAL | Age: 56
Discharge: HOME | End: 2021-02-26
Attending: PODIATRIST
Payer: MEDICARE

## 2021-02-26 ENCOUNTER — TELEPHONE (OUTPATIENT)
Dept: VASCULAR SURGERY | Facility: CLINIC | Age: 56
End: 2021-02-26

## 2021-02-26 VITALS — SYSTOLIC BLOOD PRESSURE: 164 MMHG | TEMPERATURE: 98 F | DIASTOLIC BLOOD PRESSURE: 68 MMHG | HEART RATE: 80 BPM

## 2021-02-26 DIAGNOSIS — E11.65 UNCONTROLLED TYPE 2 DIABETES MELLITUS WITH HYPERGLYCEMIA: ICD-10-CM

## 2021-02-26 DIAGNOSIS — E11.621 DIABETIC ULCER OF LEFT HEEL ASSOCIATED WITH TYPE 2 DIABETES MELLITUS, WITH BONE INVOLVEMENT WITHOUT EVIDENCE OF NECROSIS: Primary | ICD-10-CM

## 2021-02-26 DIAGNOSIS — L97.426 DIABETIC ULCER OF LEFT HEEL ASSOCIATED WITH TYPE 2 DIABETES MELLITUS, WITH BONE INVOLVEMENT WITHOUT EVIDENCE OF NECROSIS: Primary | ICD-10-CM

## 2021-02-26 PROCEDURE — 99499 NO LOS: ICD-10-PCS | Mod: ,,, | Performed by: PODIATRIST

## 2021-02-26 PROCEDURE — 11042 WOUND DEBRIDEMENT: ICD-10-PCS | Mod: ,,, | Performed by: PODIATRIST

## 2021-02-26 PROCEDURE — 11042 DBRDMT SUBQ TIS 1ST 20SQCM/<: CPT | Performed by: PODIATRIST

## 2021-02-26 PROCEDURE — 99499 UNLISTED E&M SERVICE: CPT | Mod: ,,, | Performed by: PODIATRIST

## 2021-02-26 PROCEDURE — 97602 WOUND(S) CARE NON-SELECTIVE: CPT | Performed by: PODIATRIST

## 2021-02-26 PROCEDURE — 11042 DBRDMT SUBQ TIS 1ST 20SQCM/<: CPT | Mod: ,,, | Performed by: PODIATRIST

## 2021-02-27 DIAGNOSIS — M86.179 OTHER ACUTE OSTEOMYELITIS, UNSPECIFIED ANKLE AND FOOT: Primary | ICD-10-CM

## 2021-02-27 RX ORDER — OXYCODONE AND ACETAMINOPHEN 10; 325 MG/1; MG/1
1 TABLET ORAL EVERY 6 HOURS PRN
Qty: 28 TABLET | Refills: 0 | Status: SHIPPED | OUTPATIENT
Start: 2021-02-27 | End: 2021-03-15 | Stop reason: SDUPTHER

## 2021-02-27 RX ORDER — OXYCODONE AND ACETAMINOPHEN 10; 325 MG/1; MG/1
1 TABLET ORAL EVERY 4 HOURS PRN
Qty: 28 TABLET | Refills: 0 | Status: SHIPPED | OUTPATIENT
Start: 2021-02-27 | End: 2021-02-27

## 2021-03-01 ENCOUNTER — TELEPHONE (OUTPATIENT)
Dept: ENDOCRINOLOGY | Facility: CLINIC | Age: 56
End: 2021-03-01

## 2021-03-02 ENCOUNTER — HOSPITAL ENCOUNTER (OUTPATIENT)
Dept: PREADMISSION TESTING | Facility: HOSPITAL | Age: 56
Discharge: HOME | DRG: 253 | End: 2021-03-02
Attending: SURGERY
Payer: MEDICARE

## 2021-03-02 VITALS
SYSTOLIC BLOOD PRESSURE: 175 MMHG | BODY MASS INDEX: 31.65 KG/M2 | OXYGEN SATURATION: 100 % | DIASTOLIC BLOOD PRESSURE: 90 MMHG | HEART RATE: 96 BPM | HEIGHT: 65 IN | WEIGHT: 190 LBS | RESPIRATION RATE: 18 BRPM

## 2021-03-02 DIAGNOSIS — Z01.818 PREOP TESTING: ICD-10-CM

## 2021-03-02 DIAGNOSIS — I70.229 CRITICAL LOWER LIMB ISCHEMIA: ICD-10-CM

## 2021-03-02 LAB
ANION GAP SERPL CALC-SCNC: 8 MMOL/L (ref 8–16)
BASOPHILS # BLD AUTO: 0.05 K/UL (ref 0–0.2)
BASOPHILS NFR BLD: 0.6 % (ref 0–1.9)
BUN SERPL-MCNC: 18 MG/DL (ref 6–20)
CALCIUM SERPL-MCNC: 8.8 MG/DL (ref 8.7–10.5)
CHLORIDE SERPL-SCNC: 109 MMOL/L (ref 95–110)
CO2 SERPL-SCNC: 21 MMOL/L (ref 23–29)
CREAT SERPL-MCNC: 1.3 MG/DL (ref 0.5–1.4)
DIFFERENTIAL METHOD: ABNORMAL
EOSINOPHIL # BLD AUTO: 0.1 K/UL (ref 0–0.5)
EOSINOPHIL NFR BLD: 1.3 % (ref 0–8)
ERYTHROCYTE [DISTWIDTH] IN BLOOD BY AUTOMATED COUNT: 13.4 % (ref 11.5–14.5)
EST. GFR  (AFRICAN AMERICAN): 54 ML/MIN/1.73 M^2
EST. GFR  (NON AFRICAN AMERICAN): 47 ML/MIN/1.73 M^2
GLUCOSE SERPL-MCNC: 130 MG/DL (ref 70–110)
HCT VFR BLD AUTO: 31.1 % (ref 37–48.5)
HGB BLD-MCNC: 9.5 G/DL (ref 12–16)
IMM GRANULOCYTES # BLD AUTO: 0.03 K/UL (ref 0–0.04)
IMM GRANULOCYTES NFR BLD AUTO: 0.3 % (ref 0–0.5)
LYMPHOCYTES # BLD AUTO: 1.1 K/UL (ref 1–4.8)
LYMPHOCYTES NFR BLD: 12.9 % (ref 18–48)
MCH RBC QN AUTO: 30.1 PG (ref 27–31)
MCHC RBC AUTO-ENTMCNC: 30.5 G/DL (ref 32–36)
MCV RBC AUTO: 98 FL (ref 82–98)
MONOCYTES # BLD AUTO: 0.5 K/UL (ref 0.3–1)
MONOCYTES NFR BLD: 6.3 % (ref 4–15)
NEUTROPHILS # BLD AUTO: 6.8 K/UL (ref 1.8–7.7)
NEUTROPHILS NFR BLD: 78.6 % (ref 38–73)
NRBC BLD-RTO: 0 /100 WBC
PLATELET # BLD AUTO: 563 K/UL (ref 150–350)
PMV BLD AUTO: 9.2 FL (ref 9.2–12.9)
POTASSIUM SERPL-SCNC: 4.3 MMOL/L (ref 3.5–5.1)
RBC # BLD AUTO: 3.16 M/UL (ref 4–5.4)
SARS-COV-2 RDRP RESP QL NAA+PROBE: NEGATIVE
SODIUM SERPL-SCNC: 138 MMOL/L (ref 136–145)
WBC # BLD AUTO: 8.63 K/UL (ref 3.9–12.7)

## 2021-03-02 PROCEDURE — 36415 COLL VENOUS BLD VENIPUNCTURE: CPT

## 2021-03-02 PROCEDURE — 85025 COMPLETE CBC W/AUTO DIFF WBC: CPT

## 2021-03-02 PROCEDURE — 80048 BASIC METABOLIC PNL TOTAL CA: CPT

## 2021-03-02 PROCEDURE — 93010 EKG 12-LEAD: ICD-10-PCS | Mod: ,,, | Performed by: INTERNAL MEDICINE

## 2021-03-02 PROCEDURE — 93005 ELECTROCARDIOGRAM TRACING: CPT

## 2021-03-02 PROCEDURE — 93010 ELECTROCARDIOGRAM REPORT: CPT | Mod: ,,, | Performed by: INTERNAL MEDICINE

## 2021-03-02 PROCEDURE — U0002 COVID-19 LAB TEST NON-CDC: HCPCS

## 2021-03-04 ENCOUNTER — ANESTHESIA (OUTPATIENT)
Dept: SURGERY | Facility: HOSPITAL | Age: 56
DRG: 253 | End: 2021-03-04
Payer: MEDICARE

## 2021-03-04 ENCOUNTER — ANESTHESIA EVENT (OUTPATIENT)
Dept: SURGERY | Facility: HOSPITAL | Age: 56
DRG: 253 | End: 2021-03-04
Payer: MEDICARE

## 2021-03-04 ENCOUNTER — HOSPITAL ENCOUNTER (INPATIENT)
Facility: HOSPITAL | Age: 56
LOS: 1 days | Discharge: HOME | DRG: 253 | End: 2021-03-05
Attending: SURGERY | Admitting: SURGERY
Payer: MEDICARE

## 2021-03-04 ENCOUNTER — DOCUMENTATION ONLY (OUTPATIENT)
Dept: CARDIOLOGY | Facility: CLINIC | Age: 56
End: 2021-03-04

## 2021-03-04 DIAGNOSIS — I99.8 ISCHEMIA OF LOWER EXTREMITY: ICD-10-CM

## 2021-03-04 DIAGNOSIS — Z01.818 PREOP TESTING: ICD-10-CM

## 2021-03-04 DIAGNOSIS — E11.21 TYPE 2 DIABETES MELLITUS WITH DIABETIC NEPHROPATHY, WITH LONG-TERM CURRENT USE OF INSULIN: ICD-10-CM

## 2021-03-04 DIAGNOSIS — L97.426 DIABETIC ULCER OF LEFT HEEL ASSOCIATED WITH TYPE 2 DIABETES MELLITUS, WITH BONE INVOLVEMENT WITHOUT EVIDENCE OF NECROSIS: ICD-10-CM

## 2021-03-04 DIAGNOSIS — I70.229 CRITICAL LOWER LIMB ISCHEMIA: Primary | ICD-10-CM

## 2021-03-04 DIAGNOSIS — I25.10 CORONARY ARTERY DISEASE INVOLVING NATIVE CORONARY ARTERY OF NATIVE HEART WITHOUT ANGINA PECTORIS: ICD-10-CM

## 2021-03-04 DIAGNOSIS — I73.9 PERIPHERAL ARTERY DISEASE: ICD-10-CM

## 2021-03-04 DIAGNOSIS — I25.5 ISCHEMIC CARDIOMYOPATHY: ICD-10-CM

## 2021-03-04 DIAGNOSIS — E11.621 DIABETIC ULCER OF LEFT HEEL ASSOCIATED WITH TYPE 2 DIABETES MELLITUS, WITH FAT LAYER EXPOSED: ICD-10-CM

## 2021-03-04 DIAGNOSIS — L97.422 DIABETIC ULCER OF LEFT HEEL ASSOCIATED WITH TYPE 2 DIABETES MELLITUS, WITH FAT LAYER EXPOSED: ICD-10-CM

## 2021-03-04 DIAGNOSIS — E11.65 UNCONTROLLED TYPE 2 DIABETES MELLITUS WITH HYPERGLYCEMIA: ICD-10-CM

## 2021-03-04 DIAGNOSIS — Z79.4 TYPE 2 DIABETES MELLITUS WITH DIABETIC NEPHROPATHY, WITH LONG-TERM CURRENT USE OF INSULIN: ICD-10-CM

## 2021-03-04 DIAGNOSIS — E11.621 DIABETIC ULCER OF LEFT HEEL ASSOCIATED WITH TYPE 2 DIABETES MELLITUS, WITH BONE INVOLVEMENT WITHOUT EVIDENCE OF NECROSIS: ICD-10-CM

## 2021-03-04 PROBLEM — I50.32 CHRONIC DIASTOLIC HEART FAILURE: Status: ACTIVE | Noted: 2019-12-08

## 2021-03-04 LAB
ABO + RH BLD: NORMAL
ALBUMIN SERPL BCP-MCNC: 1.5 G/DL (ref 3.5–5.2)
ALLENS TEST: ABNORMAL
ANION GAP SERPL CALC-SCNC: 5 MMOL/L (ref 8–16)
APTT BLDCRRT: 28.8 SEC (ref 21–32)
APTT BLDCRRT: >150 SEC (ref 21–32)
BASOPHILS # BLD AUTO: 0.04 K/UL (ref 0–0.2)
BASOPHILS NFR BLD: 0.4 % (ref 0–1.9)
BLD GP AB SCN CELLS X3 SERPL QL: NORMAL
BUN SERPL-MCNC: 18 MG/DL (ref 6–20)
CALCIUM SERPL-MCNC: 8.2 MG/DL (ref 8.7–10.5)
CHLORIDE SERPL-SCNC: 113 MMOL/L (ref 95–110)
CO2 SERPL-SCNC: 21 MMOL/L (ref 23–29)
CREAT SERPL-MCNC: 1.2 MG/DL (ref 0.5–1.4)
DIFFERENTIAL METHOD: ABNORMAL
EOSINOPHIL # BLD AUTO: 0.2 K/UL (ref 0–0.5)
EOSINOPHIL NFR BLD: 1.4 % (ref 0–8)
ERYTHROCYTE [DISTWIDTH] IN BLOOD BY AUTOMATED COUNT: 13.5 % (ref 11.5–14.5)
ERYTHROCYTE [DISTWIDTH] IN BLOOD BY AUTOMATED COUNT: 13.5 % (ref 11.5–14.5)
EST. GFR  (AFRICAN AMERICAN): 59 ML/MIN/1.73 M^2
EST. GFR  (NON AFRICAN AMERICAN): 51 ML/MIN/1.73 M^2
GLUCOSE SERPL-MCNC: 155 MG/DL (ref 70–110)
HCT VFR BLD AUTO: 25.1 % (ref 37–48.5)
HCT VFR BLD AUTO: 25.6 % (ref 37–48.5)
HGB BLD-MCNC: 7.8 G/DL (ref 12–16)
HGB BLD-MCNC: 8.1 G/DL (ref 12–16)
IMM GRANULOCYTES # BLD AUTO: 0.04 K/UL (ref 0–0.04)
IMM GRANULOCYTES NFR BLD AUTO: 0.4 % (ref 0–0.5)
INR PPP: 1.1 (ref 0.8–1.2)
INR PPP: 1.1 (ref 0.8–1.2)
LYMPHOCYTES # BLD AUTO: 2 K/UL (ref 1–4.8)
LYMPHOCYTES NFR BLD: 18.7 % (ref 18–48)
MAGNESIUM SERPL-MCNC: 1.7 MG/DL (ref 1.6–2.6)
MCH RBC QN AUTO: 30.4 PG (ref 27–31)
MCH RBC QN AUTO: 30.5 PG (ref 27–31)
MCHC RBC AUTO-ENTMCNC: 31.1 G/DL (ref 32–36)
MCHC RBC AUTO-ENTMCNC: 31.6 G/DL (ref 32–36)
MCV RBC AUTO: 96 FL (ref 82–98)
MCV RBC AUTO: 98 FL (ref 82–98)
MONOCYTES # BLD AUTO: 0.9 K/UL (ref 0.3–1)
MONOCYTES NFR BLD: 8.1 % (ref 4–15)
NEUTROPHILS # BLD AUTO: 7.5 K/UL (ref 1.8–7.7)
NEUTROPHILS NFR BLD: 71 % (ref 38–73)
NRBC BLD-RTO: 0 /100 WBC
PHOSPHATE SERPL-MCNC: 3.1 MG/DL (ref 2.7–4.5)
PHOSPHATE SERPL-MCNC: 3.1 MG/DL (ref 2.7–4.5)
PLATELET # BLD AUTO: 470 K/UL (ref 150–350)
PLATELET # BLD AUTO: 505 K/UL (ref 150–350)
PMV BLD AUTO: 8.9 FL (ref 9.2–12.9)
PMV BLD AUTO: 9.1 FL (ref 9.2–12.9)
POC ACTIVATED CLOTTING TIME K: 142 SEC (ref 74–137)
POC ACTIVATED CLOTTING TIME K: 241 SEC (ref 74–137)
POC ACTIVATED CLOTTING TIME K: 257 SEC (ref 74–137)
POC ACTIVATED CLOTTING TIME K: 263 SEC (ref 74–137)
POC ACTIVATED CLOTTING TIME K: 301 SEC (ref 74–137)
POCT GLUCOSE: 128 MG/DL (ref 70–110)
POCT GLUCOSE: 151 MG/DL (ref 70–110)
POCT GLUCOSE: 165 MG/DL (ref 70–110)
POTASSIUM SERPL-SCNC: 3.9 MMOL/L (ref 3.5–5.1)
PROTHROMBIN TIME: 11.2 SEC (ref 9–12.5)
PROTHROMBIN TIME: 11.4 SEC (ref 9–12.5)
RBC # BLD AUTO: 2.57 M/UL (ref 4–5.4)
RBC # BLD AUTO: 2.66 M/UL (ref 4–5.4)
SAMPLE: ABNORMAL
SITE: ABNORMAL
SODIUM SERPL-SCNC: 139 MMOL/L (ref 136–145)
WBC # BLD AUTO: 10.61 K/UL (ref 3.9–12.7)
WBC # BLD AUTO: 8.9 K/UL (ref 3.9–12.7)

## 2021-03-04 PROCEDURE — 37226 PR FEM/POPL REVASC W/STENT: ICD-10-PCS | Mod: 59,51,LT, | Performed by: SURGERY

## 2021-03-04 PROCEDURE — 11043 DBRDMT MUSC&/FSCA 1ST 20/<: CPT | Mod: 59,51,, | Performed by: SURGERY

## 2021-03-04 PROCEDURE — 76937 PR  US GUIDE, VASCULAR ACCESS: ICD-10-PCS | Mod: 26,,, | Performed by: ANESTHESIOLOGY

## 2021-03-04 PROCEDURE — 86900 BLOOD TYPING SEROLOGIC ABO: CPT | Performed by: SURGERY

## 2021-03-04 PROCEDURE — D9220A PRA ANESTHESIA: Mod: CRNA,,, | Performed by: NURSE ANESTHETIST, CERTIFIED REGISTERED

## 2021-03-04 PROCEDURE — 99499 NO LOS: ICD-10-PCS | Mod: ,,, | Performed by: SURGERY

## 2021-03-04 PROCEDURE — 25000003 PHARM REV CODE 250: Performed by: SURGERY

## 2021-03-04 PROCEDURE — 27800505 HC CATH, RADIAL ARTERY KIT: Performed by: ANESTHESIOLOGY

## 2021-03-04 PROCEDURE — 25000003 PHARM REV CODE 250: Performed by: STUDENT IN AN ORGANIZED HEALTH CARE EDUCATION/TRAINING PROGRAM

## 2021-03-04 PROCEDURE — C1876 STENT, NON-COA/NON-COV W/DEL: HCPCS | Performed by: SURGERY

## 2021-03-04 PROCEDURE — 36620 PR INSERT CATH,ART,PERCUT,SHORTTERM: ICD-10-PCS | Mod: 59,,, | Performed by: ANESTHESIOLOGY

## 2021-03-04 PROCEDURE — 63600175 PHARM REV CODE 636 W HCPCS: Performed by: STUDENT IN AN ORGANIZED HEALTH CARE EDUCATION/TRAINING PROGRAM

## 2021-03-04 PROCEDURE — 88304 PR  SURG PATH,LEVEL III: ICD-10-PCS | Mod: 26,,, | Performed by: PATHOLOGY

## 2021-03-04 PROCEDURE — 76937 US GUIDE VASCULAR ACCESS: CPT | Performed by: ANESTHESIOLOGY

## 2021-03-04 PROCEDURE — D9220A PRA ANESTHESIA: ICD-10-PCS | Mod: ANES,,, | Performed by: ANESTHESIOLOGY

## 2021-03-04 PROCEDURE — 36000707: Performed by: SURGERY

## 2021-03-04 PROCEDURE — 85730 THROMBOPLASTIN TIME PARTIAL: CPT | Performed by: SURGERY

## 2021-03-04 PROCEDURE — 37000009 HC ANESTHESIA EA ADD 15 MINS: Performed by: SURGERY

## 2021-03-04 PROCEDURE — 11043 PR DEBRIDEMENT, SKIN, SUB-Q TISSUE,MUSCLE,=<20 SQ CM: ICD-10-PCS | Mod: 59,51,, | Performed by: SURGERY

## 2021-03-04 PROCEDURE — 36415 COLL VENOUS BLD VENIPUNCTURE: CPT | Performed by: SURGERY

## 2021-03-04 PROCEDURE — 27100025 HC TUBING, SET FLUID WARMER: Performed by: ANESTHESIOLOGY

## 2021-03-04 PROCEDURE — 37226 PR FEM/POPL REVASC W/STENT: CPT | Mod: 59,51,LT, | Performed by: SURGERY

## 2021-03-04 PROCEDURE — 37228 PR TIB/PER REVASC W/TLA: ICD-10-PCS | Mod: 59,51,LT, | Performed by: SURGERY

## 2021-03-04 PROCEDURE — 25000003 PHARM REV CODE 250: Performed by: NURSE ANESTHETIST, CERTIFIED REGISTERED

## 2021-03-04 PROCEDURE — C1725 CATH, TRANSLUMIN NON-LASER: HCPCS | Performed by: SURGERY

## 2021-03-04 PROCEDURE — 36000706: Performed by: SURGERY

## 2021-03-04 PROCEDURE — 63600175 PHARM REV CODE 636 W HCPCS: Performed by: NURSE ANESTHETIST, CERTIFIED REGISTERED

## 2021-03-04 PROCEDURE — 82962 GLUCOSE BLOOD TEST: CPT | Performed by: SURGERY

## 2021-03-04 PROCEDURE — 88304 TISSUE EXAM BY PATHOLOGIST: CPT | Mod: 26,,, | Performed by: PATHOLOGY

## 2021-03-04 PROCEDURE — 36620 INSERTION CATHETER ARTERY: CPT | Mod: 59,,, | Performed by: ANESTHESIOLOGY

## 2021-03-04 PROCEDURE — 35875 REMOVAL OF CLOT IN GRAFT: CPT | Mod: LT,,, | Performed by: SURGERY

## 2021-03-04 PROCEDURE — 20000000 HC ICU ROOM

## 2021-03-04 PROCEDURE — 71000033 HC RECOVERY, INTIAL HOUR: Performed by: SURGERY

## 2021-03-04 PROCEDURE — C1769 GUIDE WIRE: HCPCS | Performed by: SURGERY

## 2021-03-04 PROCEDURE — 83735 ASSAY OF MAGNESIUM: CPT | Performed by: SURGERY

## 2021-03-04 PROCEDURE — 88304 TISSUE EXAM BY PATHOLOGIST: CPT | Performed by: PATHOLOGY

## 2021-03-04 PROCEDURE — C1894 INTRO/SHEATH, NON-LASER: HCPCS | Performed by: SURGERY

## 2021-03-04 PROCEDURE — 85025 COMPLETE CBC W/AUTO DIFF WBC: CPT | Performed by: SURGERY

## 2021-03-04 PROCEDURE — 27201423 OPTIME MED/SURG SUP & DEVICES STERILE SUPPLY: Performed by: SURGERY

## 2021-03-04 PROCEDURE — 80069 RENAL FUNCTION PANEL: CPT | Performed by: SURGERY

## 2021-03-04 PROCEDURE — 35875 PR THROMBECTOMY A-V GRAFT, EXC HEMODIALYSIS: ICD-10-PCS | Mod: LT,,, | Performed by: SURGERY

## 2021-03-04 PROCEDURE — 63600175 PHARM REV CODE 636 W HCPCS: Performed by: SURGERY

## 2021-03-04 PROCEDURE — 27200677 HC TRANSDUCER MONITOR KIT SINGLE: Performed by: ANESTHESIOLOGY

## 2021-03-04 PROCEDURE — C1730 CATH, EP, 19 OR FEW ELECT: HCPCS | Performed by: SURGERY

## 2021-03-04 PROCEDURE — 76937 US GUIDE VASCULAR ACCESS: CPT | Mod: 26,,, | Performed by: ANESTHESIOLOGY

## 2021-03-04 PROCEDURE — 63600175 PHARM REV CODE 636 W HCPCS: Performed by: ANESTHESIOLOGY

## 2021-03-04 PROCEDURE — D9220A PRA ANESTHESIA: Mod: ANES,,, | Performed by: ANESTHESIOLOGY

## 2021-03-04 PROCEDURE — 85027 COMPLETE CBC AUTOMATED: CPT | Mod: 91 | Performed by: SURGERY

## 2021-03-04 PROCEDURE — C1757 CATH, THROMBECTOMY/EMBOLECT: HCPCS | Performed by: SURGERY

## 2021-03-04 PROCEDURE — 37228 PR TIB/PER REVASC W/TLA: CPT | Mod: 59,51,LT, | Performed by: SURGERY

## 2021-03-04 PROCEDURE — 71000039 HC RECOVERY, EACH ADD'L HOUR: Performed by: SURGERY

## 2021-03-04 PROCEDURE — C1887 CATHETER, GUIDING: HCPCS | Performed by: SURGERY

## 2021-03-04 PROCEDURE — D9220A PRA ANESTHESIA: ICD-10-PCS | Mod: CRNA,,, | Performed by: NURSE ANESTHETIST, CERTIFIED REGISTERED

## 2021-03-04 PROCEDURE — 86920 COMPATIBILITY TEST SPIN: CPT | Mod: 59 | Performed by: SURGERY

## 2021-03-04 PROCEDURE — 99499 UNLISTED E&M SERVICE: CPT | Mod: ,,, | Performed by: SURGERY

## 2021-03-04 PROCEDURE — 85610 PROTHROMBIN TIME: CPT | Mod: 91 | Performed by: SURGERY

## 2021-03-04 PROCEDURE — 37000008 HC ANESTHESIA 1ST 15 MINUTES: Performed by: SURGERY

## 2021-03-04 DEVICE — IMPLANTABLE DEVICE: Type: IMPLANTABLE DEVICE | Site: LEG | Status: FUNCTIONAL

## 2021-03-04 RX ORDER — CARVEDILOL 6.25 MG/1
6.25 TABLET ORAL DAILY
Status: DISCONTINUED | OUTPATIENT
Start: 2021-03-05 | End: 2021-03-05 | Stop reason: HOSPADM

## 2021-03-04 RX ORDER — NEOSTIGMINE METHYLSULFATE 1 MG/ML
INJECTION, SOLUTION INTRAVENOUS
Status: DISCONTINUED | OUTPATIENT
Start: 2021-03-04 | End: 2021-03-04

## 2021-03-04 RX ORDER — HYDROCODONE BITARTRATE AND ACETAMINOPHEN 5; 325 MG/1; MG/1
1 TABLET ORAL EVERY 4 HOURS PRN
Status: DISCONTINUED | OUTPATIENT
Start: 2021-03-04 | End: 2021-03-05 | Stop reason: HOSPADM

## 2021-03-04 RX ORDER — METOPROLOL TARTRATE 1 MG/ML
INJECTION, SOLUTION INTRAVENOUS
Status: DISCONTINUED | OUTPATIENT
Start: 2021-03-04 | End: 2021-03-04

## 2021-03-04 RX ORDER — SODIUM CHLORIDE 0.9 % (FLUSH) 0.9 %
3 SYRINGE (ML) INJECTION
Status: DISCONTINUED | OUTPATIENT
Start: 2021-03-04 | End: 2021-03-04 | Stop reason: HOSPADM

## 2021-03-04 RX ORDER — SODIUM CHLORIDE 9 MG/ML
INJECTION, SOLUTION INTRAVENOUS CONTINUOUS
Status: DISCONTINUED | OUTPATIENT
Start: 2021-03-04 | End: 2021-03-05

## 2021-03-04 RX ORDER — HYDRALAZINE HYDROCHLORIDE 20 MG/ML
10 INJECTION INTRAMUSCULAR; INTRAVENOUS EVERY 6 HOURS PRN
Status: DISCONTINUED | OUTPATIENT
Start: 2021-03-04 | End: 2021-03-05 | Stop reason: HOSPADM

## 2021-03-04 RX ORDER — FENTANYL CITRATE 50 UG/ML
25 INJECTION, SOLUTION INTRAMUSCULAR; INTRAVENOUS EVERY 5 MIN PRN
Status: COMPLETED | OUTPATIENT
Start: 2021-03-04 | End: 2021-03-04

## 2021-03-04 RX ORDER — MIDAZOLAM HYDROCHLORIDE 1 MG/ML
INJECTION, SOLUTION INTRAMUSCULAR; INTRAVENOUS
Status: DISCONTINUED | OUTPATIENT
Start: 2021-03-04 | End: 2021-03-04

## 2021-03-04 RX ORDER — FENTANYL CITRATE 50 UG/ML
INJECTION, SOLUTION INTRAMUSCULAR; INTRAVENOUS
Status: DISCONTINUED | OUTPATIENT
Start: 2021-03-04 | End: 2021-03-04

## 2021-03-04 RX ORDER — HEPARIN SODIUM 1000 [USP'U]/ML
INJECTION, SOLUTION INTRAVENOUS; SUBCUTANEOUS
Status: DISCONTINUED | OUTPATIENT
Start: 2021-03-04 | End: 2021-03-04 | Stop reason: HOSPADM

## 2021-03-04 RX ORDER — SODIUM CHLORIDE, SODIUM LACTATE, POTASSIUM CHLORIDE, CALCIUM CHLORIDE 600; 310; 30; 20 MG/100ML; MG/100ML; MG/100ML; MG/100ML
INJECTION, SOLUTION INTRAVENOUS CONTINUOUS
Status: DISCONTINUED | OUTPATIENT
Start: 2021-03-04 | End: 2021-03-05

## 2021-03-04 RX ORDER — AMLODIPINE BESYLATE 5 MG/1
5 TABLET ORAL DAILY
Status: DISCONTINUED | OUTPATIENT
Start: 2021-03-04 | End: 2021-03-05 | Stop reason: HOSPADM

## 2021-03-04 RX ORDER — PHENYLEPHRINE HYDROCHLORIDE 10 MG/ML
INJECTION INTRAVENOUS
Status: DISCONTINUED | OUTPATIENT
Start: 2021-03-04 | End: 2021-03-04

## 2021-03-04 RX ORDER — LABETALOL HYDROCHLORIDE 5 MG/ML
INJECTION, SOLUTION INTRAVENOUS
Status: DISCONTINUED | OUTPATIENT
Start: 2021-03-04 | End: 2021-03-04

## 2021-03-04 RX ORDER — HEPARIN SODIUM 1000 [USP'U]/ML
INJECTION, SOLUTION INTRAVENOUS; SUBCUTANEOUS
Status: DISCONTINUED | OUTPATIENT
Start: 2021-03-04 | End: 2021-03-04

## 2021-03-04 RX ORDER — ACETAMINOPHEN 325 MG/1
650 TABLET ORAL EVERY 4 HOURS PRN
Status: DISCONTINUED | OUTPATIENT
Start: 2021-03-04 | End: 2021-03-05 | Stop reason: HOSPADM

## 2021-03-04 RX ORDER — ONDANSETRON 2 MG/ML
4 INJECTION INTRAMUSCULAR; INTRAVENOUS EVERY 4 HOURS PRN
Status: DISCONTINUED | OUTPATIENT
Start: 2021-03-04 | End: 2021-03-05 | Stop reason: HOSPADM

## 2021-03-04 RX ORDER — SODIUM BICARBONATE 325 MG/1
650 TABLET ORAL 2 TIMES DAILY
Status: DISCONTINUED | OUTPATIENT
Start: 2021-03-05 | End: 2021-03-05 | Stop reason: HOSPADM

## 2021-03-04 RX ORDER — LABETALOL HYDROCHLORIDE 5 MG/ML
10 INJECTION, SOLUTION INTRAVENOUS EVERY 4 HOURS PRN
Status: DISCONTINUED | OUTPATIENT
Start: 2021-03-04 | End: 2021-03-05 | Stop reason: HOSPADM

## 2021-03-04 RX ORDER — HYDROCODONE BITARTRATE AND ACETAMINOPHEN 500; 5 MG/1; MG/1
TABLET ORAL
Status: DISCONTINUED | OUTPATIENT
Start: 2021-03-04 | End: 2021-03-04 | Stop reason: HOSPADM

## 2021-03-04 RX ORDER — HYDROMORPHONE HYDROCHLORIDE 2 MG/ML
0.2 INJECTION, SOLUTION INTRAMUSCULAR; INTRAVENOUS; SUBCUTANEOUS
Status: DISCONTINUED | OUTPATIENT
Start: 2021-03-04 | End: 2021-03-05

## 2021-03-04 RX ORDER — DIPHENHYDRAMINE HYDROCHLORIDE 50 MG/ML
INJECTION INTRAMUSCULAR; INTRAVENOUS
Status: DISCONTINUED | OUTPATIENT
Start: 2021-03-04 | End: 2021-03-04

## 2021-03-04 RX ORDER — PROTAMINE SULFATE 10 MG/ML
INJECTION, SOLUTION INTRAVENOUS
Status: DISCONTINUED | OUTPATIENT
Start: 2021-03-04 | End: 2021-03-04

## 2021-03-04 RX ORDER — CLINDAMYCIN PHOSPHATE 900 MG/50ML
900 INJECTION, SOLUTION INTRAVENOUS
Status: COMPLETED | OUTPATIENT
Start: 2021-03-04 | End: 2021-03-04

## 2021-03-04 RX ORDER — GLUCAGON 1 MG
1 KIT INJECTION
Status: DISCONTINUED | OUTPATIENT
Start: 2021-03-04 | End: 2021-03-04 | Stop reason: SDUPTHER

## 2021-03-04 RX ORDER — ATORVASTATIN CALCIUM 40 MG/1
80 TABLET, FILM COATED ORAL DAILY
Status: DISCONTINUED | OUTPATIENT
Start: 2021-03-05 | End: 2021-03-05 | Stop reason: HOSPADM

## 2021-03-04 RX ORDER — INSULIN ASPART 100 [IU]/ML
0-5 INJECTION, SOLUTION INTRAVENOUS; SUBCUTANEOUS EVERY 6 HOURS PRN
Status: DISCONTINUED | OUTPATIENT
Start: 2021-03-04 | End: 2021-03-05 | Stop reason: HOSPADM

## 2021-03-04 RX ORDER — ROCURONIUM BROMIDE 10 MG/ML
INJECTION, SOLUTION INTRAVENOUS
Status: DISCONTINUED | OUTPATIENT
Start: 2021-03-04 | End: 2021-03-04

## 2021-03-04 RX ORDER — HEPARIN SODIUM,PORCINE/D5W 25000/250
0-40 INTRAVENOUS SOLUTION INTRAVENOUS CONTINUOUS
Status: DISCONTINUED | OUTPATIENT
Start: 2021-03-04 | End: 2021-03-05

## 2021-03-04 RX ORDER — PROPOFOL 10 MG/ML
VIAL (ML) INTRAVENOUS
Status: DISCONTINUED | OUTPATIENT
Start: 2021-03-04 | End: 2021-03-04

## 2021-03-04 RX ORDER — HYDROMORPHONE HYDROCHLORIDE 2 MG/ML
0.2 INJECTION, SOLUTION INTRAMUSCULAR; INTRAVENOUS; SUBCUTANEOUS EVERY 5 MIN PRN
Status: DISCONTINUED | OUTPATIENT
Start: 2021-03-04 | End: 2021-03-04 | Stop reason: HOSPADM

## 2021-03-04 RX ORDER — MUPIROCIN 20 MG/G
OINTMENT TOPICAL 2 TIMES DAILY
Status: DISCONTINUED | OUTPATIENT
Start: 2021-03-04 | End: 2021-03-05 | Stop reason: HOSPADM

## 2021-03-04 RX ORDER — LIDOCAINE HYDROCHLORIDE 20 MG/ML
INJECTION INTRAVENOUS
Status: DISCONTINUED | OUTPATIENT
Start: 2021-03-04 | End: 2021-03-04

## 2021-03-04 RX ORDER — SUCCINYLCHOLINE CHLORIDE 20 MG/ML
INJECTION INTRAMUSCULAR; INTRAVENOUS
Status: DISCONTINUED | OUTPATIENT
Start: 2021-03-04 | End: 2021-03-04

## 2021-03-04 RX ORDER — GLUCAGON 1 MG
1 KIT INJECTION
Status: DISCONTINUED | OUTPATIENT
Start: 2021-03-04 | End: 2021-03-05 | Stop reason: HOSPADM

## 2021-03-04 RX ORDER — GENTAMICIN SULFATE 40 MG/ML
INJECTION, SOLUTION INTRAMUSCULAR; INTRAVENOUS
Status: DISCONTINUED | OUTPATIENT
Start: 2021-03-04 | End: 2021-03-04 | Stop reason: HOSPADM

## 2021-03-04 RX ADMIN — HYDROMORPHONE HYDROCHLORIDE 0.2 MG: 2 INJECTION INTRAMUSCULAR; INTRAVENOUS; SUBCUTANEOUS at 05:03

## 2021-03-04 RX ADMIN — PHENYLEPHRINE HYDROCHLORIDE 50 MCG: 10 INJECTION INTRAVENOUS at 04:03

## 2021-03-04 RX ADMIN — PROPOFOL 30 MG: 10 INJECTION, EMULSION INTRAVENOUS at 03:03

## 2021-03-04 RX ADMIN — METOPROLOL TARTRATE 1 MG: 5 INJECTION, SOLUTION INTRAVENOUS at 03:03

## 2021-03-04 RX ADMIN — NEOSTIGMINE METHYLSULFATE 4 MG: 1 INJECTION INTRAVENOUS at 04:03

## 2021-03-04 RX ADMIN — ROCURONIUM BROMIDE 20 MG: 10 INJECTION, SOLUTION INTRAVENOUS at 02:03

## 2021-03-04 RX ADMIN — FENTANYL CITRATE 25 MCG: 50 INJECTION, SOLUTION INTRAMUSCULAR; INTRAVENOUS at 05:03

## 2021-03-04 RX ADMIN — PHENYLEPHRINE HYDROCHLORIDE 50 MCG: 10 INJECTION INTRAVENOUS at 03:03

## 2021-03-04 RX ADMIN — PHENYLEPHRINE HYDROCHLORIDE 100 MCG: 10 INJECTION INTRAVENOUS at 01:03

## 2021-03-04 RX ADMIN — LABETALOL HYDROCHLORIDE 5 MG: 5 INJECTION, SOLUTION INTRAVENOUS at 04:03

## 2021-03-04 RX ADMIN — LABETALOL HYDROCHLORIDE 10 MG: 5 INJECTION, SOLUTION INTRAVENOUS at 05:03

## 2021-03-04 RX ADMIN — CEFAZOLIN SODIUM 3 G: 2 SOLUTION INTRAVENOUS at 09:03

## 2021-03-04 RX ADMIN — PHENYLEPHRINE HYDROCHLORIDE 100 MCG: 10 INJECTION INTRAVENOUS at 04:03

## 2021-03-04 RX ADMIN — SODIUM CHLORIDE, SODIUM LACTATE, POTASSIUM CHLORIDE, AND CALCIUM CHLORIDE: .6; .31; .03; .02 INJECTION, SOLUTION INTRAVENOUS at 09:03

## 2021-03-04 RX ADMIN — SUCCINYLCHOLINE CHLORIDE 160 MG: 20 INJECTION, SOLUTION INTRAMUSCULAR; INTRAVENOUS at 01:03

## 2021-03-04 RX ADMIN — HYDROMORPHONE HYDROCHLORIDE 0.2 MG: 2 INJECTION INTRAMUSCULAR; INTRAVENOUS; SUBCUTANEOUS at 06:03

## 2021-03-04 RX ADMIN — FENTANYL CITRATE 50 MCG: 50 INJECTION, SOLUTION INTRAMUSCULAR; INTRAVENOUS at 12:03

## 2021-03-04 RX ADMIN — MUPIROCIN: 20 OINTMENT TOPICAL at 09:03

## 2021-03-04 RX ADMIN — PHENYLEPHRINE HYDROCHLORIDE 100 MCG: 10 INJECTION INTRAVENOUS at 02:03

## 2021-03-04 RX ADMIN — PHENYLEPHRINE HYDROCHLORIDE 50 MCG: 10 INJECTION INTRAVENOUS at 02:03

## 2021-03-04 RX ADMIN — MIDAZOLAM HYDROCHLORIDE 2 MG: 1 INJECTION, SOLUTION INTRAMUSCULAR; INTRAVENOUS at 12:03

## 2021-03-04 RX ADMIN — PROTAMINE SULFATE 5 MG: 10 INJECTION, SOLUTION INTRAVENOUS at 04:03

## 2021-03-04 RX ADMIN — FENTANYL CITRATE 50 MCG: 50 INJECTION, SOLUTION INTRAMUSCULAR; INTRAVENOUS at 01:03

## 2021-03-04 RX ADMIN — ROCURONIUM BROMIDE 10 MG: 10 INJECTION, SOLUTION INTRAVENOUS at 01:03

## 2021-03-04 RX ADMIN — FENTANYL CITRATE 50 MCG: 50 INJECTION, SOLUTION INTRAMUSCULAR; INTRAVENOUS at 02:03

## 2021-03-04 RX ADMIN — HEPARIN SODIUM 9000 UNITS: 1000 INJECTION, SOLUTION INTRAVENOUS; SUBCUTANEOUS at 02:03

## 2021-03-04 RX ADMIN — ONDANSETRON 4 MG: 2 INJECTION INTRAMUSCULAR; INTRAVENOUS at 09:03

## 2021-03-04 RX ADMIN — Medication 100 MG: at 01:03

## 2021-03-04 RX ADMIN — GLYCOPYRROLATE 0.3 MCG: 0.2 INJECTION, SOLUTION INTRAMUSCULAR; INTRAVITREAL at 04:03

## 2021-03-04 RX ADMIN — FENTANYL CITRATE 25 MCG: 50 INJECTION, SOLUTION INTRAMUSCULAR; INTRAVENOUS at 04:03

## 2021-03-04 RX ADMIN — ROCURONIUM BROMIDE 20 MG: 10 INJECTION, SOLUTION INTRAVENOUS at 01:03

## 2021-03-04 RX ADMIN — HEPARIN SODIUM 1000 UNITS: 1000 INJECTION, SOLUTION INTRAVENOUS; SUBCUTANEOUS at 03:03

## 2021-03-04 RX ADMIN — HEPARIN SODIUM 12 UNITS/KG/HR: 10000 INJECTION, SOLUTION INTRAVENOUS at 10:03

## 2021-03-04 RX ADMIN — PROPOFOL 10 MG: 10 INJECTION, EMULSION INTRAVENOUS at 03:03

## 2021-03-04 RX ADMIN — PROPOFOL 30 MG: 10 INJECTION, EMULSION INTRAVENOUS at 04:03

## 2021-03-04 RX ADMIN — LABETALOL HYDROCHLORIDE 10 MG: 5 INJECTION, SOLUTION INTRAVENOUS at 04:03

## 2021-03-04 RX ADMIN — SODIUM CHLORIDE: 0.9 INJECTION, SOLUTION INTRAVENOUS at 11:03

## 2021-03-04 RX ADMIN — PROPOFOL 120 MG: 10 INJECTION, EMULSION INTRAVENOUS at 01:03

## 2021-03-04 RX ADMIN — HYDROCODONE BITARTRATE AND ACETAMINOPHEN 1 TABLET: 5; 325 TABLET ORAL at 09:03

## 2021-03-04 RX ADMIN — HEPARIN SODIUM 2000 UNITS: 1000 INJECTION, SOLUTION INTRAVENOUS; SUBCUTANEOUS at 03:03

## 2021-03-04 RX ADMIN — DIPHENHYDRAMINE HYDROCHLORIDE 50 MG: 50 INJECTION INTRAMUSCULAR; INTRAVENOUS at 01:03

## 2021-03-04 RX ADMIN — CLINDAMYCIN PHOSPHATE 900 MG: 18 INJECTION, SOLUTION INTRAVENOUS at 01:03

## 2021-03-04 RX ADMIN — METOPROLOL TARTRATE 1 MG: 5 INJECTION, SOLUTION INTRAVENOUS at 02:03

## 2021-03-05 ENCOUNTER — HOSPITAL ENCOUNTER (OUTPATIENT)
Dept: WOUND CARE | Facility: HOSPITAL | Age: 56
Discharge: HOME | End: 2021-03-05
Attending: PODIATRIST

## 2021-03-05 VITALS
WEIGHT: 197.31 LBS | HEIGHT: 65 IN | SYSTOLIC BLOOD PRESSURE: 130 MMHG | HEART RATE: 84 BPM | OXYGEN SATURATION: 100 % | RESPIRATION RATE: 26 BRPM | TEMPERATURE: 98 F | BODY MASS INDEX: 32.87 KG/M2 | DIASTOLIC BLOOD PRESSURE: 61 MMHG

## 2021-03-05 PROBLEM — E11.621 DIABETIC ULCER OF LEFT HEEL ASSOCIATED WITH TYPE 2 DIABETES MELLITUS, WITH FAT LAYER EXPOSED: Status: ACTIVE | Noted: 2021-03-05

## 2021-03-05 PROBLEM — L97.422 DIABETIC ULCER OF LEFT HEEL ASSOCIATED WITH TYPE 2 DIABETES MELLITUS, WITH FAT LAYER EXPOSED: Status: ACTIVE | Noted: 2021-03-05

## 2021-03-05 LAB
ANION GAP SERPL CALC-SCNC: 9 MMOL/L (ref 8–16)
APTT BLDCRRT: 32.5 SEC (ref 21–32)
APTT BLDCRRT: 32.5 SEC (ref 21–32)
APTT BLDCRRT: 35.8 SEC (ref 21–32)
BASOPHILS # BLD AUTO: 0.03 K/UL (ref 0–0.2)
BASOPHILS # BLD AUTO: 0.03 K/UL (ref 0–0.2)
BASOPHILS NFR BLD: 0.3 % (ref 0–1.9)
BASOPHILS NFR BLD: 0.3 % (ref 0–1.9)
BUN SERPL-MCNC: 19 MG/DL (ref 6–20)
CALCIUM SERPL-MCNC: 7.9 MG/DL (ref 8.7–10.5)
CHLORIDE SERPL-SCNC: 110 MMOL/L (ref 95–110)
CO2 SERPL-SCNC: 20 MMOL/L (ref 23–29)
CREAT SERPL-MCNC: 1.3 MG/DL (ref 0.5–1.4)
DIFFERENTIAL METHOD: ABNORMAL
DIFFERENTIAL METHOD: ABNORMAL
EOSINOPHIL # BLD AUTO: 0.1 K/UL (ref 0–0.5)
EOSINOPHIL # BLD AUTO: 0.1 K/UL (ref 0–0.5)
EOSINOPHIL NFR BLD: 1.2 % (ref 0–8)
EOSINOPHIL NFR BLD: 1.2 % (ref 0–8)
ERYTHROCYTE [DISTWIDTH] IN BLOOD BY AUTOMATED COUNT: 13.5 % (ref 11.5–14.5)
ERYTHROCYTE [DISTWIDTH] IN BLOOD BY AUTOMATED COUNT: 13.5 % (ref 11.5–14.5)
ERYTHROCYTE [DISTWIDTH] IN BLOOD BY AUTOMATED COUNT: 15.5 % (ref 11.5–14.5)
EST. GFR  (AFRICAN AMERICAN): 54 ML/MIN/1.73 M^2
EST. GFR  (NON AFRICAN AMERICAN): 47 ML/MIN/1.73 M^2
GLUCOSE SERPL-MCNC: 122 MG/DL (ref 70–110)
HCT VFR BLD AUTO: 22.6 % (ref 37–48.5)
HCT VFR BLD AUTO: 22.6 % (ref 37–48.5)
HCT VFR BLD AUTO: 25.2 % (ref 37–48.5)
HGB BLD-MCNC: 7.1 G/DL (ref 12–16)
HGB BLD-MCNC: 7.1 G/DL (ref 12–16)
HGB BLD-MCNC: 8.2 G/DL (ref 12–16)
IMM GRANULOCYTES # BLD AUTO: 0.04 K/UL (ref 0–0.04)
IMM GRANULOCYTES # BLD AUTO: 0.04 K/UL (ref 0–0.04)
IMM GRANULOCYTES NFR BLD AUTO: 0.4 % (ref 0–0.5)
IMM GRANULOCYTES NFR BLD AUTO: 0.4 % (ref 0–0.5)
INR PPP: 1.1 (ref 0.8–1.2)
LYMPHOCYTES # BLD AUTO: 1.9 K/UL (ref 1–4.8)
LYMPHOCYTES # BLD AUTO: 1.9 K/UL (ref 1–4.8)
LYMPHOCYTES NFR BLD: 19.1 % (ref 18–48)
LYMPHOCYTES NFR BLD: 19.1 % (ref 18–48)
MCH RBC QN AUTO: 30.6 PG (ref 27–31)
MCH RBC QN AUTO: 30.7 PG (ref 27–31)
MCH RBC QN AUTO: 30.7 PG (ref 27–31)
MCHC RBC AUTO-ENTMCNC: 31.4 G/DL (ref 32–36)
MCHC RBC AUTO-ENTMCNC: 31.4 G/DL (ref 32–36)
MCHC RBC AUTO-ENTMCNC: 32.5 G/DL (ref 32–36)
MCV RBC AUTO: 94 FL (ref 82–98)
MCV RBC AUTO: 98 FL (ref 82–98)
MCV RBC AUTO: 98 FL (ref 82–98)
MONOCYTES # BLD AUTO: 0.8 K/UL (ref 0.3–1)
MONOCYTES # BLD AUTO: 0.8 K/UL (ref 0.3–1)
MONOCYTES NFR BLD: 8.3 % (ref 4–15)
MONOCYTES NFR BLD: 8.3 % (ref 4–15)
NEUTROPHILS # BLD AUTO: 7.2 K/UL (ref 1.8–7.7)
NEUTROPHILS # BLD AUTO: 7.2 K/UL (ref 1.8–7.7)
NEUTROPHILS NFR BLD: 70.7 % (ref 38–73)
NEUTROPHILS NFR BLD: 70.7 % (ref 38–73)
NRBC BLD-RTO: 0 /100 WBC
NRBC BLD-RTO: 0 /100 WBC
PLATELET # BLD AUTO: 439 K/UL (ref 150–350)
PLATELET # BLD AUTO: 458 K/UL (ref 150–350)
PLATELET # BLD AUTO: 458 K/UL (ref 150–350)
PMV BLD AUTO: 9.5 FL (ref 9.2–12.9)
POCT GLUCOSE: 128 MG/DL (ref 70–110)
POCT GLUCOSE: 179 MG/DL (ref 70–110)
POTASSIUM SERPL-SCNC: 4 MMOL/L (ref 3.5–5.1)
PROTHROMBIN TIME: 11.5 SEC (ref 9–12.5)
RBC # BLD AUTO: 2.31 M/UL (ref 4–5.4)
RBC # BLD AUTO: 2.31 M/UL (ref 4–5.4)
RBC # BLD AUTO: 2.68 M/UL (ref 4–5.4)
SODIUM SERPL-SCNC: 139 MMOL/L (ref 136–145)
WBC # BLD AUTO: 10.13 K/UL (ref 3.9–12.7)
WBC # BLD AUTO: 10.13 K/UL (ref 3.9–12.7)
WBC # BLD AUTO: 11.59 K/UL (ref 3.9–12.7)

## 2021-03-05 PROCEDURE — 85610 PROTHROMBIN TIME: CPT | Performed by: SURGERY

## 2021-03-05 PROCEDURE — 25000003 PHARM REV CODE 250: Performed by: SURGERY

## 2021-03-05 PROCEDURE — 99900035 HC TECH TIME PER 15 MIN (STAT)

## 2021-03-05 PROCEDURE — 63600175 PHARM REV CODE 636 W HCPCS: Performed by: SURGERY

## 2021-03-05 PROCEDURE — 85025 COMPLETE CBC W/AUTO DIFF WBC: CPT | Performed by: SURGERY

## 2021-03-05 PROCEDURE — 94761 N-INVAS EAR/PLS OXIMETRY MLT: CPT

## 2021-03-05 PROCEDURE — 99223 1ST HOSP IP/OBS HIGH 75: CPT | Mod: ,,, | Performed by: PODIATRIST

## 2021-03-05 PROCEDURE — 85027 COMPLETE CBC AUTOMATED: CPT | Performed by: SURGERY

## 2021-03-05 PROCEDURE — 85730 THROMBOPLASTIN TIME PARTIAL: CPT | Performed by: SURGERY

## 2021-03-05 PROCEDURE — 36415 COLL VENOUS BLD VENIPUNCTURE: CPT | Performed by: SURGERY

## 2021-03-05 PROCEDURE — 94799 UNLISTED PULMONARY SVC/PX: CPT

## 2021-03-05 PROCEDURE — 80048 BASIC METABOLIC PNL TOTAL CA: CPT | Performed by: SURGERY

## 2021-03-05 PROCEDURE — 99223 PR INITIAL HOSPITAL CARE,LEVL III: ICD-10-PCS | Mod: ,,, | Performed by: PODIATRIST

## 2021-03-05 PROCEDURE — 36430 TRANSFUSION BLD/BLD COMPNT: CPT

## 2021-03-05 PROCEDURE — 25000003 PHARM REV CODE 250: Performed by: INTERNAL MEDICINE

## 2021-03-05 PROCEDURE — P9021 RED BLOOD CELLS UNIT: HCPCS | Performed by: SURGERY

## 2021-03-05 RX ORDER — OXYCODONE AND ACETAMINOPHEN 5; 325 MG/1; MG/1
1 TABLET ORAL EVERY 6 HOURS PRN
Qty: 20 TABLET | Refills: 0 | Status: SHIPPED | OUTPATIENT
Start: 2021-03-05 | End: 2021-03-19

## 2021-03-05 RX ORDER — HYDROCODONE BITARTRATE AND ACETAMINOPHEN 500; 5 MG/1; MG/1
TABLET ORAL
Status: DISCONTINUED | OUTPATIENT
Start: 2021-03-05 | End: 2021-03-05 | Stop reason: HOSPADM

## 2021-03-05 RX ADMIN — APIXABAN 5 MG: 5 TABLET, FILM COATED ORAL at 12:03

## 2021-03-05 RX ADMIN — APIXABAN 5 MG: 5 TABLET, FILM COATED ORAL at 04:03

## 2021-03-05 RX ADMIN — HYDROMORPHONE HYDROCHLORIDE 0.2 MG: 2 INJECTION INTRAMUSCULAR; INTRAVENOUS; SUBCUTANEOUS at 03:03

## 2021-03-05 RX ADMIN — SODIUM BICARBONATE TAB 325 MG 650 MG: 325 TAB at 08:03

## 2021-03-05 RX ADMIN — AMLODIPINE BESYLATE 5 MG: 5 TABLET ORAL at 12:03

## 2021-03-05 RX ADMIN — ATORVASTATIN CALCIUM 80 MG: 40 TABLET, FILM COATED ORAL at 08:03

## 2021-03-05 RX ADMIN — MUPIROCIN: 20 OINTMENT TOPICAL at 08:03

## 2021-03-05 RX ADMIN — HYDROCODONE BITARTRATE AND ACETAMINOPHEN 1 TABLET: 5; 325 TABLET ORAL at 02:03

## 2021-03-05 RX ADMIN — CEFAZOLIN SODIUM 3 G: 2 SOLUTION INTRAVENOUS at 05:03

## 2021-03-05 RX ADMIN — CARVEDILOL 6.25 MG: 6.25 TABLET, FILM COATED ORAL at 08:03

## 2021-03-05 RX ADMIN — AMLODIPINE BESYLATE 5 MG: 5 TABLET ORAL at 08:03

## 2021-03-05 RX ADMIN — ONDANSETRON 4 MG: 2 INJECTION INTRAMUSCULAR; INTRAVENOUS at 08:03

## 2021-03-05 RX ADMIN — HYDROCODONE BITARTRATE AND ACETAMINOPHEN 1 TABLET: 5; 325 TABLET ORAL at 08:03

## 2021-03-07 LAB
BLD PROD TYP BPU: NORMAL
BLD PROD TYP BPU: NORMAL
BLOOD UNIT EXPIRATION DATE: NORMAL
BLOOD UNIT EXPIRATION DATE: NORMAL
BLOOD UNIT TYPE CODE: 5100
BLOOD UNIT TYPE CODE: 5100
BLOOD UNIT TYPE: NORMAL
BLOOD UNIT TYPE: NORMAL
CODING SYSTEM: NORMAL
CODING SYSTEM: NORMAL
DISPENSE STATUS: NORMAL
DISPENSE STATUS: NORMAL
TRANS ERYTHROCYTES VOL PATIENT: NORMAL ML
TRANS ERYTHROCYTES VOL PATIENT: NORMAL ML

## 2021-03-08 ENCOUNTER — ANESTHESIA EVENT (OUTPATIENT)
Dept: SURGERY | Facility: HOSPITAL | Age: 56
End: 2021-03-08
Payer: MEDICARE

## 2021-03-08 ENCOUNTER — PATIENT OUTREACH (OUTPATIENT)
Dept: ADMINISTRATIVE | Facility: CLINIC | Age: 56
End: 2021-03-08

## 2021-03-08 DIAGNOSIS — M86.179 OTHER ACUTE OSTEOMYELITIS, UNSPECIFIED ANKLE AND FOOT: Primary | ICD-10-CM

## 2021-03-08 LAB — FUNGUS SPEC CULT: NORMAL

## 2021-03-09 ENCOUNTER — ANESTHESIA (OUTPATIENT)
Dept: SURGERY | Facility: HOSPITAL | Age: 56
End: 2021-03-09
Payer: MEDICARE

## 2021-03-09 ENCOUNTER — IMMUNIZATION (OUTPATIENT)
Dept: PRIMARY CARE CLINIC | Facility: CLINIC | Age: 56
End: 2021-03-09
Payer: MEDICARE

## 2021-03-09 ENCOUNTER — HOSPITAL ENCOUNTER (OUTPATIENT)
Facility: HOSPITAL | Age: 56
Discharge: HOME | End: 2021-03-09
Attending: PODIATRIST | Admitting: PODIATRIST
Payer: MEDICARE

## 2021-03-09 ENCOUNTER — PATIENT OUTREACH (OUTPATIENT)
Dept: ADMINISTRATIVE | Facility: OTHER | Age: 56
End: 2021-03-09

## 2021-03-09 VITALS
WEIGHT: 197 LBS | RESPIRATION RATE: 18 BRPM | HEART RATE: 77 BPM | BODY MASS INDEX: 32.82 KG/M2 | HEIGHT: 65 IN | TEMPERATURE: 98 F | DIASTOLIC BLOOD PRESSURE: 99 MMHG | OXYGEN SATURATION: 99 % | SYSTOLIC BLOOD PRESSURE: 193 MMHG

## 2021-03-09 DIAGNOSIS — Z11.52 ENCOUNTER FOR PREOPERATIVE SCREENING LABORATORY TESTING FOR COVID-19 VIRUS: Primary | ICD-10-CM

## 2021-03-09 DIAGNOSIS — M86.179 ACUTE OSTEOMYELITIS OF ANKLE AND FOOT: ICD-10-CM

## 2021-03-09 DIAGNOSIS — M86.172 ACUTE OSTEOMYELITIS OF LEFT CALCANEUS: ICD-10-CM

## 2021-03-09 DIAGNOSIS — E11.621 DIABETIC ULCER OF LEFT HEEL ASSOCIATED WITH TYPE 2 DIABETES MELLITUS, WITH FAT LAYER EXPOSED: ICD-10-CM

## 2021-03-09 DIAGNOSIS — Z01.812 ENCOUNTER FOR PREOPERATIVE SCREENING LABORATORY TESTING FOR COVID-19 VIRUS: Primary | ICD-10-CM

## 2021-03-09 DIAGNOSIS — Z23 NEED FOR VACCINATION: Primary | ICD-10-CM

## 2021-03-09 DIAGNOSIS — S99.922A INJURY OF TOENAIL OF LEFT FOOT, INITIAL ENCOUNTER: ICD-10-CM

## 2021-03-09 DIAGNOSIS — S99.922A INJURY OF TOENAIL OF LEFT FOOT, INITIAL ENCOUNTER: Primary | ICD-10-CM

## 2021-03-09 DIAGNOSIS — L97.422 DIABETIC ULCER OF LEFT HEEL ASSOCIATED WITH TYPE 2 DIABETES MELLITUS, WITH FAT LAYER EXPOSED: ICD-10-CM

## 2021-03-09 DIAGNOSIS — M86.172 ACUTE OSTEOMYELITIS OF LEFT ANKLE OR FOOT: ICD-10-CM

## 2021-03-09 LAB
ABO + RH BLD: NORMAL
BLD GP AB SCN CELLS X3 SERPL QL: NORMAL
FINAL PATHOLOGIC DIAGNOSIS: NORMAL
GRAM STN SPEC: NORMAL
GRAM STN SPEC: NORMAL
GROSS: NORMAL
Lab: NORMAL
SARS-COV-2 RDRP RESP QL NAA+PROBE: NEGATIVE

## 2021-03-09 PROCEDURE — 88311 PR  DECALCIFY TISSUE: ICD-10-PCS | Mod: 26,,, | Performed by: PATHOLOGY

## 2021-03-09 PROCEDURE — 63600175 PHARM REV CODE 636 W HCPCS: Performed by: NURSE ANESTHETIST, CERTIFIED REGISTERED

## 2021-03-09 PROCEDURE — 36000706: Performed by: PODIATRIST

## 2021-03-09 PROCEDURE — 63600175 PHARM REV CODE 636 W HCPCS: Performed by: PODIATRIST

## 2021-03-09 PROCEDURE — 11044 DBRDMT BONE 1ST 20 SQ CM/<: CPT | Mod: ,,, | Performed by: PODIATRIST

## 2021-03-09 PROCEDURE — 87070 CULTURE OTHR SPECIMN AEROBIC: CPT | Performed by: PODIATRIST

## 2021-03-09 PROCEDURE — 87186 SC STD MICRODIL/AGAR DIL: CPT | Performed by: PODIATRIST

## 2021-03-09 PROCEDURE — 63600175 PHARM REV CODE 636 W HCPCS: Performed by: ANESTHESIOLOGY

## 2021-03-09 PROCEDURE — 11730 AVULSION NAIL PLATE SIMPLE 1: CPT | Mod: 59,TA,, | Performed by: PODIATRIST

## 2021-03-09 PROCEDURE — 87116 MYCOBACTERIA CULTURE: CPT | Performed by: PODIATRIST

## 2021-03-09 PROCEDURE — 0001A COVID-19, MRNA, LNP-S, PF, 30 MCG/0.3 ML DOSE VACCINE: CPT | Mod: CV19,S$GLB,, | Performed by: INTERNAL MEDICINE

## 2021-03-09 PROCEDURE — 82962 GLUCOSE BLOOD TEST: CPT | Performed by: PODIATRIST

## 2021-03-09 PROCEDURE — 11044 PR DEBRIDEMENT, SKIN, SUB-Q TISSUE,MUSCLE,BONE,=<20 SQ CM: ICD-10-PCS | Mod: ,,, | Performed by: PODIATRIST

## 2021-03-09 PROCEDURE — 88307 TISSUE EXAM BY PATHOLOGIST: CPT | Mod: 26,,, | Performed by: PATHOLOGY

## 2021-03-09 PROCEDURE — D9220A PRA ANESTHESIA: Mod: CRNA,,, | Performed by: NURSE ANESTHETIST, CERTIFIED REGISTERED

## 2021-03-09 PROCEDURE — 88311 DECALCIFY TISSUE: CPT | Mod: 26,,, | Performed by: PATHOLOGY

## 2021-03-09 PROCEDURE — 87205 SMEAR GRAM STAIN: CPT | Performed by: PODIATRIST

## 2021-03-09 PROCEDURE — 88307 TISSUE EXAM BY PATHOLOGIST: CPT | Performed by: PATHOLOGY

## 2021-03-09 PROCEDURE — 87075 CULTR BACTERIA EXCEPT BLOOD: CPT | Performed by: PODIATRIST

## 2021-03-09 PROCEDURE — 27201423 OPTIME MED/SURG SUP & DEVICES STERILE SUPPLY: Performed by: PODIATRIST

## 2021-03-09 PROCEDURE — 91300 COVID-19, MRNA, LNP-S, PF, 30 MCG/0.3 ML DOSE VACCINE: ICD-10-PCS | Mod: S$GLB,,, | Performed by: INTERNAL MEDICINE

## 2021-03-09 PROCEDURE — 25000003 PHARM REV CODE 250: Performed by: PODIATRIST

## 2021-03-09 PROCEDURE — 87206 SMEAR FLUORESCENT/ACID STAI: CPT | Performed by: PODIATRIST

## 2021-03-09 PROCEDURE — 37000009 HC ANESTHESIA EA ADD 15 MINS: Performed by: PODIATRIST

## 2021-03-09 PROCEDURE — 88307 PR  SURG PATH,LEVEL V: ICD-10-PCS | Mod: 26,,, | Performed by: PATHOLOGY

## 2021-03-09 PROCEDURE — U0002 COVID-19 LAB TEST NON-CDC: HCPCS | Performed by: PODIATRIST

## 2021-03-09 PROCEDURE — 25000003 PHARM REV CODE 250: Performed by: ANESTHESIOLOGY

## 2021-03-09 PROCEDURE — 36000707: Performed by: PODIATRIST

## 2021-03-09 PROCEDURE — 87077 CULTURE AEROBIC IDENTIFY: CPT | Performed by: PODIATRIST

## 2021-03-09 PROCEDURE — 71000016 HC POSTOP RECOV ADDL HR: Performed by: PODIATRIST

## 2021-03-09 PROCEDURE — 86900 BLOOD TYPING SEROLOGIC ABO: CPT | Performed by: ANESTHESIOLOGY

## 2021-03-09 PROCEDURE — D9220A PRA ANESTHESIA: ICD-10-PCS | Mod: ANES,,, | Performed by: ANESTHESIOLOGY

## 2021-03-09 PROCEDURE — D9220A PRA ANESTHESIA: ICD-10-PCS | Mod: CRNA,,, | Performed by: NURSE ANESTHETIST, CERTIFIED REGISTERED

## 2021-03-09 PROCEDURE — 11730 PR REMOVAL OF NAIL PLATE: ICD-10-PCS | Mod: 59,TA,, | Performed by: PODIATRIST

## 2021-03-09 PROCEDURE — 87102 FUNGUS ISOLATION CULTURE: CPT | Performed by: PODIATRIST

## 2021-03-09 PROCEDURE — D9220A PRA ANESTHESIA: Mod: ANES,,, | Performed by: ANESTHESIOLOGY

## 2021-03-09 PROCEDURE — 87076 CULTURE ANAEROBE IDENT EACH: CPT | Performed by: PODIATRIST

## 2021-03-09 PROCEDURE — 71000015 HC POSTOP RECOV 1ST HR: Performed by: PODIATRIST

## 2021-03-09 PROCEDURE — 25000003 PHARM REV CODE 250: Performed by: NURSE ANESTHETIST, CERTIFIED REGISTERED

## 2021-03-09 PROCEDURE — 37000008 HC ANESTHESIA 1ST 15 MINUTES: Performed by: PODIATRIST

## 2021-03-09 PROCEDURE — 91300 COVID-19, MRNA, LNP-S, PF, 30 MCG/0.3 ML DOSE VACCINE: CPT | Mod: S$GLB,,, | Performed by: INTERNAL MEDICINE

## 2021-03-09 PROCEDURE — 88311 DECALCIFY TISSUE: CPT | Performed by: PATHOLOGY

## 2021-03-09 PROCEDURE — 36415 COLL VENOUS BLD VENIPUNCTURE: CPT | Performed by: ANESTHESIOLOGY

## 2021-03-09 PROCEDURE — 0001A COVID-19, MRNA, LNP-S, PF, 30 MCG/0.3 ML DOSE VACCINE: ICD-10-PCS | Mod: CV19,S$GLB,, | Performed by: INTERNAL MEDICINE

## 2021-03-09 DEVICE — IMPLANTABLE DEVICE: Type: IMPLANTABLE DEVICE | Site: HEEL | Status: FUNCTIONAL

## 2021-03-09 RX ORDER — SODIUM CHLORIDE, SODIUM LACTATE, POTASSIUM CHLORIDE, CALCIUM CHLORIDE 600; 310; 30; 20 MG/100ML; MG/100ML; MG/100ML; MG/100ML
INJECTION, SOLUTION INTRAVENOUS CONTINUOUS
Status: DISCONTINUED | OUTPATIENT
Start: 2021-03-09 | End: 2022-02-01

## 2021-03-09 RX ORDER — PHENYLEPHRINE HYDROCHLORIDE 10 MG/ML
INJECTION INTRAVENOUS
Status: DISCONTINUED | OUTPATIENT
Start: 2021-03-09 | End: 2021-03-09

## 2021-03-09 RX ORDER — LIDOCAINE HYDROCHLORIDE 10 MG/ML
INJECTION INFILTRATION; PERINEURAL
Status: DISCONTINUED | OUTPATIENT
Start: 2021-03-09 | End: 2021-03-09 | Stop reason: HOSPADM

## 2021-03-09 RX ORDER — HYDROCODONE BITARTRATE AND ACETAMINOPHEN 5; 325 MG/1; MG/1
1 TABLET ORAL EVERY 4 HOURS PRN
Status: DISCONTINUED | OUTPATIENT
Start: 2021-03-09 | End: 2021-03-09 | Stop reason: HOSPADM

## 2021-03-09 RX ORDER — BUPIVACAINE HYDROCHLORIDE 2.5 MG/ML
INJECTION, SOLUTION INFILTRATION; PERINEURAL
Status: DISCONTINUED | OUTPATIENT
Start: 2021-03-09 | End: 2021-03-09 | Stop reason: HOSPADM

## 2021-03-09 RX ORDER — SODIUM CHLORIDE 0.9 % (FLUSH) 0.9 %
10 SYRINGE (ML) INJECTION
Status: DISCONTINUED | OUTPATIENT
Start: 2021-03-09 | End: 2021-03-09 | Stop reason: HOSPADM

## 2021-03-09 RX ORDER — ACETAMINOPHEN 500 MG
1000 TABLET ORAL
Status: COMPLETED | OUTPATIENT
Start: 2021-03-09 | End: 2021-03-09

## 2021-03-09 RX ORDER — LIDOCAINE HYDROCHLORIDE 10 MG/ML
1 INJECTION, SOLUTION EPIDURAL; INFILTRATION; INTRACAUDAL; PERINEURAL ONCE
Status: COMPLETED | OUTPATIENT
Start: 2021-03-09 | End: 2021-10-22

## 2021-03-09 RX ORDER — DOXYCYCLINE 100 MG/1
100 CAPSULE ORAL 2 TIMES DAILY
Qty: 20 CAPSULE | Refills: 0 | Status: ON HOLD | OUTPATIENT
Start: 2021-03-09 | End: 2021-03-24 | Stop reason: HOSPADM

## 2021-03-09 RX ORDER — FENTANYL CITRATE 50 UG/ML
25 INJECTION, SOLUTION INTRAMUSCULAR; INTRAVENOUS EVERY 5 MIN PRN
Status: DISCONTINUED | OUTPATIENT
Start: 2021-03-09 | End: 2021-03-09 | Stop reason: HOSPADM

## 2021-03-09 RX ORDER — CEFAZOLIN SODIUM 2 G/50ML
2 SOLUTION INTRAVENOUS
Status: COMPLETED | OUTPATIENT
Start: 2021-03-09 | End: 2021-03-09

## 2021-03-09 RX ORDER — MIDAZOLAM HYDROCHLORIDE 1 MG/ML
INJECTION, SOLUTION INTRAMUSCULAR; INTRAVENOUS
Status: DISCONTINUED | OUTPATIENT
Start: 2021-03-09 | End: 2021-03-09

## 2021-03-09 RX ORDER — PROPOFOL 10 MG/ML
VIAL (ML) INTRAVENOUS CONTINUOUS PRN
Status: DISCONTINUED | OUTPATIENT
Start: 2021-03-09 | End: 2021-03-09

## 2021-03-09 RX ORDER — LIDOCAINE HYDROCHLORIDE 20 MG/ML
INJECTION INTRAVENOUS
Status: DISCONTINUED | OUTPATIENT
Start: 2021-03-09 | End: 2021-03-09

## 2021-03-09 RX ORDER — PROPOFOL 10 MG/ML
VIAL (ML) INTRAVENOUS
Status: DISCONTINUED | OUTPATIENT
Start: 2021-03-09 | End: 2021-03-09

## 2021-03-09 RX ORDER — FENTANYL CITRATE 50 UG/ML
INJECTION, SOLUTION INTRAMUSCULAR; INTRAVENOUS
Status: DISCONTINUED | OUTPATIENT
Start: 2021-03-09 | End: 2021-03-09

## 2021-03-09 RX ADMIN — PHENYLEPHRINE HYDROCHLORIDE 100 MCG: 10 INJECTION INTRAVENOUS at 08:03

## 2021-03-09 RX ADMIN — FENTANYL CITRATE 50 MCG: 50 INJECTION, SOLUTION INTRAMUSCULAR; INTRAVENOUS at 08:03

## 2021-03-09 RX ADMIN — Medication 60 MG: at 07:03

## 2021-03-09 RX ADMIN — MIDAZOLAM HYDROCHLORIDE 2 MG: 1 INJECTION, SOLUTION INTRAMUSCULAR; INTRAVENOUS at 07:03

## 2021-03-09 RX ADMIN — SODIUM CHLORIDE, SODIUM LACTATE, POTASSIUM CHLORIDE, AND CALCIUM CHLORIDE: .6; .31; .03; .02 INJECTION, SOLUTION INTRAVENOUS at 07:03

## 2021-03-09 RX ADMIN — ACETAMINOPHEN 1000 MG: 500 TABLET ORAL at 06:03

## 2021-03-09 RX ADMIN — HYDROCODONE BITARTRATE AND ACETAMINOPHEN 1 TABLET: 5; 325 TABLET ORAL at 09:03

## 2021-03-09 RX ADMIN — PROPOFOL 50 MG: 10 INJECTION, EMULSION INTRAVENOUS at 08:03

## 2021-03-09 RX ADMIN — PROPOFOL 50 MCG/KG/MIN: 10 INJECTION, EMULSION INTRAVENOUS at 07:03

## 2021-03-09 RX ADMIN — CEFAZOLIN SODIUM 2 G: 2 SOLUTION INTRAVENOUS at 08:03

## 2021-03-09 RX ADMIN — PROPOFOL 30 MG: 10 INJECTION, EMULSION INTRAVENOUS at 08:03

## 2021-03-09 RX ADMIN — PHENYLEPHRINE HYDROCHLORIDE 200 MCG: 10 INJECTION INTRAVENOUS at 08:03

## 2021-03-10 ENCOUNTER — TELEPHONE (OUTPATIENT)
Dept: VASCULAR SURGERY | Facility: CLINIC | Age: 56
End: 2021-03-10

## 2021-03-11 LAB
FINAL PATHOLOGIC DIAGNOSIS: NORMAL
GROSS: NORMAL
Lab: NORMAL

## 2021-03-12 ENCOUNTER — HOSPITAL ENCOUNTER (OUTPATIENT)
Dept: WOUND CARE | Facility: HOSPITAL | Age: 56
Discharge: HOME | End: 2021-03-12
Attending: PODIATRIST
Payer: MEDICARE

## 2021-03-12 VITALS
HEART RATE: 91 BPM | TEMPERATURE: 97 F | DIASTOLIC BLOOD PRESSURE: 68 MMHG | RESPIRATION RATE: 20 BRPM | SYSTOLIC BLOOD PRESSURE: 138 MMHG

## 2021-03-12 DIAGNOSIS — L97.426 DIABETIC ULCER OF LEFT HEEL ASSOCIATED WITH TYPE 2 DIABETES MELLITUS, WITH BONE INVOLVEMENT WITHOUT EVIDENCE OF NECROSIS: Primary | ICD-10-CM

## 2021-03-12 DIAGNOSIS — M86.172 ACUTE OSTEOMYELITIS OF LEFT CALCANEUS: ICD-10-CM

## 2021-03-12 DIAGNOSIS — E11.621 DIABETIC ULCER OF LEFT HEEL ASSOCIATED WITH TYPE 2 DIABETES MELLITUS, WITH BONE INVOLVEMENT WITHOUT EVIDENCE OF NECROSIS: Primary | ICD-10-CM

## 2021-03-12 PROCEDURE — 97605 NEG PRS WND THER DME<=50SQCM: CPT

## 2021-03-13 LAB
BACTERIA SPEC AEROBE CULT: ABNORMAL
BACTERIA SPEC ANAEROBE CULT: NORMAL

## 2021-03-14 LAB — BACTERIA SPEC AEROBE CULT: ABNORMAL

## 2021-03-15 RX ORDER — OXYCODONE AND ACETAMINOPHEN 10; 325 MG/1; MG/1
1 TABLET ORAL EVERY 6 HOURS PRN
Qty: 28 TABLET | Refills: 0 | Status: SHIPPED | OUTPATIENT
Start: 2021-03-15 | End: 2021-03-26 | Stop reason: SDUPTHER

## 2021-03-16 LAB — BACTERIA SPEC ANAEROBE CULT: ABNORMAL

## 2021-03-17 ENCOUNTER — HOSPITAL ENCOUNTER (OUTPATIENT)
Dept: WOUND CARE | Facility: HOSPITAL | Age: 56
Discharge: HOME | End: 2021-03-17
Attending: PODIATRIST
Payer: MEDICARE

## 2021-03-17 DIAGNOSIS — L97.426 DIABETIC ULCER OF LEFT HEEL ASSOCIATED WITH TYPE 2 DIABETES MELLITUS, WITH BONE INVOLVEMENT WITHOUT EVIDENCE OF NECROSIS: Primary | ICD-10-CM

## 2021-03-17 DIAGNOSIS — E11.621 DIABETIC ULCER OF LEFT HEEL ASSOCIATED WITH TYPE 2 DIABETES MELLITUS, WITH BONE INVOLVEMENT WITHOUT EVIDENCE OF NECROSIS: Primary | ICD-10-CM

## 2021-03-17 PROCEDURE — 97607 NEG PRS WND THR NDME<=50SQCM: CPT

## 2021-03-17 PROCEDURE — 11042 DBRDMT SUBQ TIS 1ST 20SQCM/<: CPT | Performed by: FAMILY MEDICINE

## 2021-03-17 PROCEDURE — 97605 NEG PRS WND THER DME<=50SQCM: CPT

## 2021-03-17 PROCEDURE — 27201912 HC WOUND CARE DEBRIDEMENT SUPPLIES: Performed by: FAMILY MEDICINE

## 2021-03-18 ENCOUNTER — LAB VISIT (OUTPATIENT)
Dept: LAB | Facility: HOSPITAL | Age: 56
End: 2021-03-18
Attending: INTERNAL MEDICINE
Payer: MEDICARE

## 2021-03-18 ENCOUNTER — OFFICE VISIT (OUTPATIENT)
Dept: INFECTIOUS DISEASES | Facility: CLINIC | Age: 56
End: 2021-03-18
Payer: MEDICARE

## 2021-03-18 VITALS
HEART RATE: 83 BPM | SYSTOLIC BLOOD PRESSURE: 147 MMHG | TEMPERATURE: 98 F | BODY MASS INDEX: 31.77 KG/M2 | HEIGHT: 65 IN | WEIGHT: 190.69 LBS | DIASTOLIC BLOOD PRESSURE: 81 MMHG

## 2021-03-18 DIAGNOSIS — M86.172 ACUTE OSTEOMYELITIS OF LEFT CALCANEUS: Primary | ICD-10-CM

## 2021-03-18 DIAGNOSIS — M86.172 ACUTE OSTEOMYELITIS OF LEFT CALCANEUS: ICD-10-CM

## 2021-03-18 LAB
ALBUMIN SERPL BCP-MCNC: 1.9 G/DL (ref 3.5–5.2)
ALP SERPL-CCNC: 101 U/L (ref 55–135)
ALT SERPL W/O P-5'-P-CCNC: 5 U/L (ref 10–44)
ANION GAP SERPL CALC-SCNC: 7 MMOL/L (ref 8–16)
AST SERPL-CCNC: 12 U/L (ref 10–40)
BASOPHILS # BLD AUTO: 0.04 K/UL (ref 0–0.2)
BASOPHILS NFR BLD: 0.5 % (ref 0–1.9)
BILIRUB SERPL-MCNC: 0.3 MG/DL (ref 0.1–1)
BUN SERPL-MCNC: 28 MG/DL (ref 6–20)
CALCIUM SERPL-MCNC: 8.8 MG/DL (ref 8.7–10.5)
CHLORIDE SERPL-SCNC: 107 MMOL/L (ref 95–110)
CO2 SERPL-SCNC: 24 MMOL/L (ref 23–29)
CREAT SERPL-MCNC: 1.8 MG/DL (ref 0.5–1.4)
CRP SERPL-MCNC: 19.9 MG/L (ref 0–8.2)
DIFFERENTIAL METHOD: ABNORMAL
EOSINOPHIL # BLD AUTO: 0.3 K/UL (ref 0–0.5)
EOSINOPHIL NFR BLD: 3.8 % (ref 0–8)
ERYTHROCYTE [DISTWIDTH] IN BLOOD BY AUTOMATED COUNT: 14.9 % (ref 11.5–14.5)
ERYTHROCYTE [SEDIMENTATION RATE] IN BLOOD BY WESTERGREN METHOD: 110 MM/HR (ref 0–36)
EST. GFR  (AFRICAN AMERICAN): 36.3 ML/MIN/1.73 M^2
EST. GFR  (NON AFRICAN AMERICAN): 31.5 ML/MIN/1.73 M^2
GLUCOSE SERPL-MCNC: 120 MG/DL (ref 70–110)
HCT VFR BLD AUTO: 28.7 % (ref 37–48.5)
HGB BLD-MCNC: 8.9 G/DL (ref 12–16)
IMM GRANULOCYTES # BLD AUTO: 0.03 K/UL (ref 0–0.04)
IMM GRANULOCYTES NFR BLD AUTO: 0.3 % (ref 0–0.5)
LYMPHOCYTES # BLD AUTO: 2.4 K/UL (ref 1–4.8)
LYMPHOCYTES NFR BLD: 27.2 % (ref 18–48)
MCH RBC QN AUTO: 30.2 PG (ref 27–31)
MCHC RBC AUTO-ENTMCNC: 31 G/DL (ref 32–36)
MCV RBC AUTO: 97 FL (ref 82–98)
MONOCYTES # BLD AUTO: 0.6 K/UL (ref 0.3–1)
MONOCYTES NFR BLD: 7.4 % (ref 4–15)
NEUTROPHILS # BLD AUTO: 5.3 K/UL (ref 1.8–7.7)
NEUTROPHILS NFR BLD: 60.8 % (ref 38–73)
NRBC BLD-RTO: 0 /100 WBC
PLATELET # BLD AUTO: 473 K/UL (ref 150–350)
PMV BLD AUTO: 9.7 FL (ref 9.2–12.9)
POTASSIUM SERPL-SCNC: 4.3 MMOL/L (ref 3.5–5.1)
PROT SERPL-MCNC: 7.3 G/DL (ref 6–8.4)
RBC # BLD AUTO: 2.95 M/UL (ref 4–5.4)
SODIUM SERPL-SCNC: 138 MMOL/L (ref 136–145)
WBC # BLD AUTO: 8.7 K/UL (ref 3.9–12.7)

## 2021-03-18 PROCEDURE — 99214 OFFICE O/P EST MOD 30 MIN: CPT | Mod: PBBFAC | Performed by: INTERNAL MEDICINE

## 2021-03-18 PROCEDURE — 85025 COMPLETE CBC W/AUTO DIFF WBC: CPT | Performed by: INTERNAL MEDICINE

## 2021-03-18 PROCEDURE — 85652 RBC SED RATE AUTOMATED: CPT | Performed by: INTERNAL MEDICINE

## 2021-03-18 PROCEDURE — 36415 COLL VENOUS BLD VENIPUNCTURE: CPT | Performed by: INTERNAL MEDICINE

## 2021-03-18 PROCEDURE — 99999 PR PBB SHADOW E&M-EST. PATIENT-LVL IV: ICD-10-PCS | Mod: PBBFAC,,, | Performed by: INTERNAL MEDICINE

## 2021-03-18 PROCEDURE — 80053 COMPREHEN METABOLIC PANEL: CPT | Performed by: INTERNAL MEDICINE

## 2021-03-18 PROCEDURE — 99999 PR PBB SHADOW E&M-EST. PATIENT-LVL IV: CPT | Mod: PBBFAC,,, | Performed by: INTERNAL MEDICINE

## 2021-03-18 PROCEDURE — 99215 PR OFFICE/OUTPT VISIT, EST, LEVL V, 40-54 MIN: ICD-10-PCS | Mod: S$PBB,,, | Performed by: INTERNAL MEDICINE

## 2021-03-18 PROCEDURE — 86140 C-REACTIVE PROTEIN: CPT | Performed by: INTERNAL MEDICINE

## 2021-03-18 PROCEDURE — 99215 OFFICE O/P EST HI 40 MIN: CPT | Mod: S$PBB,,, | Performed by: INTERNAL MEDICINE

## 2021-03-19 ENCOUNTER — HOSPITAL ENCOUNTER (OUTPATIENT)
Dept: WOUND CARE | Facility: HOSPITAL | Age: 56
Discharge: HOME | End: 2021-03-19
Attending: PODIATRIST
Payer: MEDICARE

## 2021-03-19 VITALS
SYSTOLIC BLOOD PRESSURE: 173 MMHG | HEART RATE: 82 BPM | TEMPERATURE: 97 F | RESPIRATION RATE: 18 BRPM | DIASTOLIC BLOOD PRESSURE: 74 MMHG

## 2021-03-19 DIAGNOSIS — M86.172 ACUTE OSTEOMYELITIS OF LEFT CALCANEUS: ICD-10-CM

## 2021-03-19 DIAGNOSIS — L97.426 DIABETIC ULCER OF LEFT HEEL ASSOCIATED WITH TYPE 2 DIABETES MELLITUS, WITH BONE INVOLVEMENT WITHOUT EVIDENCE OF NECROSIS: Primary | ICD-10-CM

## 2021-03-19 DIAGNOSIS — E11.621 DIABETIC ULCER OF LEFT HEEL ASSOCIATED WITH TYPE 2 DIABETES MELLITUS, WITH BONE INVOLVEMENT WITHOUT EVIDENCE OF NECROSIS: Primary | ICD-10-CM

## 2021-03-19 PROCEDURE — 99213 OFFICE O/P EST LOW 20 MIN: CPT | Mod: ,,, | Performed by: PODIATRIST

## 2021-03-19 PROCEDURE — 97605 NEG PRS WND THER DME<=50SQCM: CPT

## 2021-03-19 PROCEDURE — 99213 PR OFFICE/OUTPT VISIT, EST, LEVL III, 20-29 MIN: ICD-10-PCS | Mod: ,,, | Performed by: PODIATRIST

## 2021-03-19 RX ORDER — MINOCYCLINE HYDROCHLORIDE 100 MG/1
100 CAPSULE ORAL EVERY 12 HOURS
Qty: 28 CAPSULE | Refills: 0 | Status: SHIPPED | OUTPATIENT
Start: 2021-03-19 | End: 2021-04-02

## 2021-03-19 RX ORDER — AMOXICILLIN AND CLAVULANATE POTASSIUM 875; 125 MG/1; MG/1
1 TABLET, FILM COATED ORAL 2 TIMES DAILY
Qty: 28 TABLET | Refills: 0 | Status: SHIPPED | OUTPATIENT
Start: 2021-03-19 | End: 2021-04-02

## 2021-03-23 ENCOUNTER — HOSPITAL ENCOUNTER (OUTPATIENT)
Facility: HOSPITAL | Age: 56
Discharge: HOME | End: 2021-03-24
Attending: EMERGENCY MEDICINE | Admitting: EMERGENCY MEDICINE
Payer: MEDICARE

## 2021-03-23 DIAGNOSIS — N18.32 STAGE 3B CHRONIC KIDNEY DISEASE: ICD-10-CM

## 2021-03-23 DIAGNOSIS — D64.9 ANEMIA, UNSPECIFIED TYPE: ICD-10-CM

## 2021-03-23 DIAGNOSIS — I10 ESSENTIAL HYPERTENSION: ICD-10-CM

## 2021-03-23 DIAGNOSIS — L97.426 DIABETIC ULCER OF LEFT HEEL ASSOCIATED WITH TYPE 2 DIABETES MELLITUS, WITH BONE INVOLVEMENT WITHOUT EVIDENCE OF NECROSIS: ICD-10-CM

## 2021-03-23 DIAGNOSIS — M86.172 ACUTE OSTEOMYELITIS OF LEFT ANKLE OR FOOT: ICD-10-CM

## 2021-03-23 DIAGNOSIS — E11.621 DIABETIC ULCER OF LEFT HEEL ASSOCIATED WITH TYPE 2 DIABETES MELLITUS, WITH BONE INVOLVEMENT WITHOUT EVIDENCE OF NECROSIS: ICD-10-CM

## 2021-03-23 DIAGNOSIS — R11.2 NAUSEA AND VOMITING, INTRACTABILITY OF VOMITING NOT SPECIFIED, UNSPECIFIED VOMITING TYPE: ICD-10-CM

## 2021-03-23 DIAGNOSIS — E87.6 HYPOKALEMIA: ICD-10-CM

## 2021-03-23 DIAGNOSIS — A08.4 VIRAL GASTROENTERITIS: ICD-10-CM

## 2021-03-23 DIAGNOSIS — E88.09 HYPOALBUMINEMIA: ICD-10-CM

## 2021-03-23 DIAGNOSIS — Z79.4 TYPE 2 DIABETES MELLITUS WITH DIABETIC NEPHROPATHY, WITH LONG-TERM CURRENT USE OF INSULIN: ICD-10-CM

## 2021-03-23 DIAGNOSIS — E11.21 TYPE 2 DIABETES MELLITUS WITH DIABETIC NEPHROPATHY, WITH LONG-TERM CURRENT USE OF INSULIN: ICD-10-CM

## 2021-03-23 DIAGNOSIS — R07.9 CHEST PAIN: ICD-10-CM

## 2021-03-23 DIAGNOSIS — I10 HYPERTENSION, UNSPECIFIED TYPE: ICD-10-CM

## 2021-03-23 DIAGNOSIS — E11.621 DIABETIC ULCER OF LEFT HEEL ASSOCIATED WITH TYPE 2 DIABETES MELLITUS, WITH FAT LAYER EXPOSED: ICD-10-CM

## 2021-03-23 DIAGNOSIS — D63.8 ANEMIA, CHRONIC DISEASE: ICD-10-CM

## 2021-03-23 DIAGNOSIS — R11.10 HYPEREMESIS: Primary | ICD-10-CM

## 2021-03-23 DIAGNOSIS — L97.422 DIABETIC ULCER OF LEFT HEEL ASSOCIATED WITH TYPE 2 DIABETES MELLITUS, WITH FAT LAYER EXPOSED: ICD-10-CM

## 2021-03-23 LAB
ALBUMIN SERPL BCP-MCNC: 1.3 G/DL (ref 3.5–5.2)
ALP SERPL-CCNC: 80 U/L (ref 55–135)
ALT SERPL W/O P-5'-P-CCNC: 5 U/L (ref 10–44)
AMPHET+METHAMPHET UR QL: NEGATIVE
ANION GAP SERPL CALC-SCNC: 10 MMOL/L (ref 8–16)
ANION GAP SERPL CALC-SCNC: 12 MMOL/L (ref 8–16)
AST SERPL-CCNC: 8 U/L (ref 10–40)
BACTERIA #/AREA URNS HPF: ABNORMAL /HPF
BARBITURATES UR QL SCN>200 NG/ML: NEGATIVE
BASOPHILS # BLD AUTO: 0.03 K/UL (ref 0–0.2)
BASOPHILS NFR BLD: 0.4 % (ref 0–1.9)
BENZODIAZ UR QL SCN>200 NG/ML: NEGATIVE
BILIRUB SERPL-MCNC: 0.2 MG/DL (ref 0.1–1)
BILIRUB UR QL STRIP: NEGATIVE
BUN SERPL-MCNC: 16 MG/DL (ref 6–20)
BUN SERPL-MCNC: 25 MG/DL (ref 6–20)
BZE UR QL SCN: NEGATIVE
CALCIUM SERPL-MCNC: 6.5 MG/DL (ref 8.7–10.5)
CALCIUM SERPL-MCNC: 9 MG/DL (ref 8.7–10.5)
CANNABINOIDS UR QL SCN: NORMAL
CHLORIDE SERPL-SCNC: 105 MMOL/L (ref 95–110)
CHLORIDE SERPL-SCNC: 118 MMOL/L (ref 95–110)
CLARITY UR: CLEAR
CO2 SERPL-SCNC: 16 MMOL/L (ref 23–29)
CO2 SERPL-SCNC: 18 MMOL/L (ref 23–29)
COLOR UR: YELLOW
CREAT SERPL-MCNC: 0.8 MG/DL (ref 0.5–1.4)
CREAT SERPL-MCNC: 1.3 MG/DL (ref 0.5–1.4)
CREAT UR-MCNC: 46.5 MG/DL (ref 15–325)
CTP QC/QA: YES
DIFFERENTIAL METHOD: ABNORMAL
EOSINOPHIL # BLD AUTO: 0 K/UL (ref 0–0.5)
EOSINOPHIL NFR BLD: 0 % (ref 0–8)
ERYTHROCYTE [DISTWIDTH] IN BLOOD BY AUTOMATED COUNT: 14.5 % (ref 11.5–14.5)
EST. GFR  (AFRICAN AMERICAN): 54 ML/MIN/1.73 M^2
EST. GFR  (AFRICAN AMERICAN): >60 ML/MIN/1.73 M^2
EST. GFR  (NON AFRICAN AMERICAN): 47 ML/MIN/1.73 M^2
EST. GFR  (NON AFRICAN AMERICAN): >60 ML/MIN/1.73 M^2
GLUCOSE SERPL-MCNC: 197 MG/DL (ref 70–110)
GLUCOSE SERPL-MCNC: 276 MG/DL (ref 70–110)
GLUCOSE UR QL STRIP: ABNORMAL
HCT VFR BLD AUTO: 23.6 % (ref 37–48.5)
HGB BLD-MCNC: 7.6 G/DL (ref 12–16)
HGB UR QL STRIP: ABNORMAL
HYALINE CASTS #/AREA URNS LPF: 3 /LPF
IMM GRANULOCYTES # BLD AUTO: 0.04 K/UL (ref 0–0.04)
IMM GRANULOCYTES NFR BLD AUTO: 0.5 % (ref 0–0.5)
KETONES UR QL STRIP: ABNORMAL
LEUKOCYTE ESTERASE UR QL STRIP: NEGATIVE
LIPASE SERPL-CCNC: 8 U/L (ref 4–60)
LYMPHOCYTES # BLD AUTO: 0.6 K/UL (ref 1–4.8)
LYMPHOCYTES NFR BLD: 7.4 % (ref 18–48)
MAGNESIUM SERPL-MCNC: 1.1 MG/DL (ref 1.6–2.6)
MCH RBC QN AUTO: 29.9 PG (ref 27–31)
MCHC RBC AUTO-ENTMCNC: 32.2 G/DL (ref 32–36)
MCV RBC AUTO: 93 FL (ref 82–98)
METHADONE UR QL SCN>300 NG/ML: NEGATIVE
MICROSCOPIC COMMENT: ABNORMAL
MONOCYTES # BLD AUTO: 0.2 K/UL (ref 0.3–1)
MONOCYTES NFR BLD: 2.2 % (ref 4–15)
NEUTROPHILS # BLD AUTO: 7 K/UL (ref 1.8–7.7)
NEUTROPHILS NFR BLD: 89.5 % (ref 38–73)
NITRITE UR QL STRIP: NEGATIVE
NRBC BLD-RTO: 0 /100 WBC
OPIATES UR QL SCN: NEGATIVE
PCP UR QL SCN>25 NG/ML: NEGATIVE
PH UR STRIP: 7 [PH] (ref 5–8)
PLATELET # BLD AUTO: 348 K/UL (ref 150–350)
PMV BLD AUTO: 9.1 FL (ref 9.2–12.9)
POCT GLUCOSE: 224 MG/DL (ref 70–110)
POCT GLUCOSE: 241 MG/DL (ref 70–110)
POCT GLUCOSE: 264 MG/DL (ref 70–110)
POTASSIUM SERPL-SCNC: 2.6 MMOL/L (ref 3.5–5.1)
POTASSIUM SERPL-SCNC: 4.2 MMOL/L (ref 3.5–5.1)
PROT SERPL-MCNC: 5.5 G/DL (ref 6–8.4)
PROT UR QL STRIP: ABNORMAL
RBC # BLD AUTO: 2.54 M/UL (ref 4–5.4)
RBC #/AREA URNS HPF: 13 /HPF (ref 0–4)
SARS-COV-2 RDRP RESP QL NAA+PROBE: NEGATIVE
SODIUM SERPL-SCNC: 135 MMOL/L (ref 136–145)
SODIUM SERPL-SCNC: 144 MMOL/L (ref 136–145)
SP GR UR STRIP: 1.01 (ref 1–1.03)
SQUAMOUS #/AREA URNS HPF: 3 /HPF
TOXICOLOGY INFORMATION: NORMAL
URN SPEC COLLECT METH UR: ABNORMAL
UROBILINOGEN UR STRIP-ACNC: NEGATIVE EU/DL
WBC # BLD AUTO: 7.86 K/UL (ref 3.9–12.7)
WBC #/AREA URNS HPF: 1 /HPF (ref 0–5)
YEAST URNS QL MICRO: ABNORMAL

## 2021-03-23 PROCEDURE — 63600175 PHARM REV CODE 636 W HCPCS: Performed by: EMERGENCY MEDICINE

## 2021-03-23 PROCEDURE — 36410 VNPNXR 3YR/> PHY/QHP DX/THER: CPT

## 2021-03-23 PROCEDURE — 80307 DRUG TEST PRSMV CHEM ANLYZR: CPT | Performed by: NURSE PRACTITIONER

## 2021-03-23 PROCEDURE — 96361 HYDRATE IV INFUSION ADD-ON: CPT

## 2021-03-23 PROCEDURE — 80053 COMPREHEN METABOLIC PANEL: CPT | Performed by: NURSE PRACTITIONER

## 2021-03-23 PROCEDURE — 96374 THER/PROPH/DIAG INJ IV PUSH: CPT | Mod: 59

## 2021-03-23 PROCEDURE — 85025 COMPLETE CBC W/AUTO DIFF WBC: CPT | Performed by: NURSE PRACTITIONER

## 2021-03-23 PROCEDURE — 93010 ELECTROCARDIOGRAM REPORT: CPT | Mod: ,,, | Performed by: INTERNAL MEDICINE

## 2021-03-23 PROCEDURE — 25000003 PHARM REV CODE 250: Performed by: EMERGENCY MEDICINE

## 2021-03-23 PROCEDURE — 93010 EKG 12-LEAD: ICD-10-PCS | Mod: ,,, | Performed by: INTERNAL MEDICINE

## 2021-03-23 PROCEDURE — 83735 ASSAY OF MAGNESIUM: CPT | Performed by: NURSE PRACTITIONER

## 2021-03-23 PROCEDURE — U0002 COVID-19 LAB TEST NON-CDC: HCPCS | Performed by: EMERGENCY MEDICINE

## 2021-03-23 PROCEDURE — 63600175 PHARM REV CODE 636 W HCPCS: Performed by: HOSPITALIST

## 2021-03-23 PROCEDURE — 96367 TX/PROPH/DG ADDL SEQ IV INF: CPT

## 2021-03-23 PROCEDURE — G0378 HOSPITAL OBSERVATION PER HR: HCPCS

## 2021-03-23 PROCEDURE — 81000 URINALYSIS NONAUTO W/SCOPE: CPT | Mod: 59 | Performed by: NURSE PRACTITIONER

## 2021-03-23 PROCEDURE — 96376 TX/PRO/DX INJ SAME DRUG ADON: CPT

## 2021-03-23 PROCEDURE — 96368 THER/DIAG CONCURRENT INF: CPT

## 2021-03-23 PROCEDURE — 25000003 PHARM REV CODE 250: Performed by: NURSE PRACTITIONER

## 2021-03-23 PROCEDURE — 63600175 PHARM REV CODE 636 W HCPCS: Performed by: NURSE PRACTITIONER

## 2021-03-23 PROCEDURE — 99213 OFFICE O/P EST LOW 20 MIN: CPT | Mod: ,,, | Performed by: PODIATRIST

## 2021-03-23 PROCEDURE — 96375 TX/PRO/DX INJ NEW DRUG ADDON: CPT

## 2021-03-23 PROCEDURE — 99213 PR OFFICE/OUTPT VISIT, EST, LEVL III, 20-29 MIN: ICD-10-PCS | Mod: ,,, | Performed by: PODIATRIST

## 2021-03-23 PROCEDURE — 96366 THER/PROPH/DIAG IV INF ADDON: CPT

## 2021-03-23 PROCEDURE — 93005 ELECTROCARDIOGRAM TRACING: CPT

## 2021-03-23 PROCEDURE — 96372 THER/PROPH/DIAG INJ SC/IM: CPT | Mod: 59

## 2021-03-23 PROCEDURE — 80048 BASIC METABOLIC PNL TOTAL CA: CPT | Performed by: NURSE PRACTITIONER

## 2021-03-23 PROCEDURE — 96365 THER/PROPH/DIAG IV INF INIT: CPT

## 2021-03-23 PROCEDURE — 36415 COLL VENOUS BLD VENIPUNCTURE: CPT | Performed by: NURSE PRACTITIONER

## 2021-03-23 PROCEDURE — 83690 ASSAY OF LIPASE: CPT | Performed by: NURSE PRACTITIONER

## 2021-03-23 PROCEDURE — 99285 EMERGENCY DEPT VISIT HI MDM: CPT | Mod: 25

## 2021-03-23 RX ORDER — SODIUM CHLORIDE, SODIUM LACTATE, POTASSIUM CHLORIDE, CALCIUM CHLORIDE 600; 310; 30; 20 MG/100ML; MG/100ML; MG/100ML; MG/100ML
INJECTION, SOLUTION INTRAVENOUS CONTINUOUS
Status: DISCONTINUED | OUTPATIENT
Start: 2021-03-23 | End: 2021-03-23

## 2021-03-23 RX ORDER — CLOPIDOGREL BISULFATE 75 MG/1
75 TABLET ORAL DAILY
Status: DISCONTINUED | OUTPATIENT
Start: 2021-03-23 | End: 2021-03-24 | Stop reason: HOSPADM

## 2021-03-23 RX ORDER — SODIUM CHLORIDE 0.9 % (FLUSH) 0.9 %
10 SYRINGE (ML) INJECTION
Status: DISCONTINUED | OUTPATIENT
Start: 2021-03-23 | End: 2021-03-24 | Stop reason: HOSPADM

## 2021-03-23 RX ORDER — ENOXAPARIN SODIUM 100 MG/ML
40 INJECTION SUBCUTANEOUS EVERY 24 HOURS
Status: DISCONTINUED | OUTPATIENT
Start: 2021-03-23 | End: 2021-03-23

## 2021-03-23 RX ORDER — MINOCYCLINE HYDROCHLORIDE 50 MG/1
100 CAPSULE ORAL EVERY 12 HOURS
Status: DISCONTINUED | OUTPATIENT
Start: 2021-03-19 | End: 2021-03-24

## 2021-03-23 RX ORDER — TALC
6 POWDER (GRAM) TOPICAL NIGHTLY PRN
Status: DISCONTINUED | OUTPATIENT
Start: 2021-03-23 | End: 2021-03-24 | Stop reason: HOSPADM

## 2021-03-23 RX ORDER — POTASSIUM CHLORIDE 20 MEQ/1
40 TABLET, EXTENDED RELEASE ORAL
Status: DISCONTINUED | OUTPATIENT
Start: 2021-03-23 | End: 2021-03-23

## 2021-03-23 RX ORDER — POTASSIUM CHLORIDE 7.45 MG/ML
10 INJECTION INTRAVENOUS
Status: COMPLETED | OUTPATIENT
Start: 2021-03-23 | End: 2021-03-23

## 2021-03-23 RX ORDER — AMOXICILLIN AND CLAVULANATE POTASSIUM 875; 125 MG/1; MG/1
1 TABLET, FILM COATED ORAL 2 TIMES DAILY
Status: DISCONTINUED | OUTPATIENT
Start: 2021-03-19 | End: 2021-03-23

## 2021-03-23 RX ORDER — ONDANSETRON 2 MG/ML
4 INJECTION INTRAMUSCULAR; INTRAVENOUS
Status: COMPLETED | OUTPATIENT
Start: 2021-03-23 | End: 2021-03-23

## 2021-03-23 RX ORDER — ATORVASTATIN CALCIUM 40 MG/1
80 TABLET, FILM COATED ORAL DAILY
Status: DISCONTINUED | OUTPATIENT
Start: 2021-03-23 | End: 2021-03-24 | Stop reason: HOSPADM

## 2021-03-23 RX ORDER — LOSARTAN POTASSIUM 25 MG/1
50 TABLET ORAL DAILY
Status: DISCONTINUED | OUTPATIENT
Start: 2021-03-23 | End: 2021-03-24 | Stop reason: HOSPADM

## 2021-03-23 RX ORDER — ACETAMINOPHEN 500 MG
500 TABLET ORAL EVERY 6 HOURS PRN
Status: DISCONTINUED | OUTPATIENT
Start: 2021-03-23 | End: 2021-03-24 | Stop reason: HOSPADM

## 2021-03-23 RX ORDER — ONDANSETRON 2 MG/ML
8 INJECTION INTRAMUSCULAR; INTRAVENOUS EVERY 8 HOURS PRN
Status: DISCONTINUED | OUTPATIENT
Start: 2021-03-23 | End: 2021-03-24 | Stop reason: HOSPADM

## 2021-03-23 RX ORDER — CARVEDILOL 6.25 MG/1
6.25 TABLET ORAL DAILY
Status: DISCONTINUED | OUTPATIENT
Start: 2021-03-23 | End: 2021-03-24 | Stop reason: HOSPADM

## 2021-03-23 RX ORDER — INSULIN ASPART 100 [IU]/ML
0-5 INJECTION, SOLUTION INTRAVENOUS; SUBCUTANEOUS
Status: DISCONTINUED | OUTPATIENT
Start: 2021-03-23 | End: 2021-03-24 | Stop reason: HOSPADM

## 2021-03-23 RX ORDER — ONDANSETRON 4 MG/1
4 TABLET, ORALLY DISINTEGRATING ORAL EVERY 6 HOURS PRN
Qty: 12 TABLET | Refills: 0 | Status: SHIPPED | OUTPATIENT
Start: 2021-03-23 | End: 2021-04-22 | Stop reason: CLARIF

## 2021-03-23 RX ORDER — SCOLOPAMINE TRANSDERMAL SYSTEM 1 MG/1
1 PATCH, EXTENDED RELEASE TRANSDERMAL
Status: DISCONTINUED | OUTPATIENT
Start: 2021-03-23 | End: 2021-03-24 | Stop reason: HOSPADM

## 2021-03-23 RX ORDER — GLUCAGON 1 MG
1 KIT INJECTION
Status: DISCONTINUED | OUTPATIENT
Start: 2021-03-23 | End: 2021-03-24 | Stop reason: HOSPADM

## 2021-03-23 RX ORDER — HYDRALAZINE HYDROCHLORIDE 20 MG/ML
10 INJECTION INTRAMUSCULAR; INTRAVENOUS
Status: COMPLETED | OUTPATIENT
Start: 2021-03-23 | End: 2021-03-23

## 2021-03-23 RX ORDER — IBUPROFEN 200 MG
16 TABLET ORAL
Status: DISCONTINUED | OUTPATIENT
Start: 2021-03-23 | End: 2021-03-24 | Stop reason: HOSPADM

## 2021-03-23 RX ORDER — ONDANSETRON 2 MG/ML
4 INJECTION INTRAMUSCULAR; INTRAVENOUS EVERY 6 HOURS PRN
Status: DISCONTINUED | OUTPATIENT
Start: 2021-03-23 | End: 2021-03-23

## 2021-03-23 RX ORDER — SODIUM CHLORIDE 9 MG/ML
INJECTION, SOLUTION INTRAVENOUS CONTINUOUS
Status: DISCONTINUED | OUTPATIENT
Start: 2021-03-23 | End: 2021-03-24

## 2021-03-23 RX ORDER — POTASSIUM CHLORIDE 7.45 MG/ML
10 INJECTION INTRAVENOUS
Status: DISPENSED | OUTPATIENT
Start: 2021-03-23 | End: 2021-03-23

## 2021-03-23 RX ORDER — HYDRALAZINE HYDROCHLORIDE 20 MG/ML
10 INJECTION INTRAMUSCULAR; INTRAVENOUS EVERY 8 HOURS PRN
Status: DISCONTINUED | OUTPATIENT
Start: 2021-03-23 | End: 2021-03-24 | Stop reason: HOSPADM

## 2021-03-23 RX ORDER — IBUPROFEN 200 MG
24 TABLET ORAL
Status: DISCONTINUED | OUTPATIENT
Start: 2021-03-23 | End: 2021-03-24 | Stop reason: HOSPADM

## 2021-03-23 RX ORDER — NAPROXEN SODIUM 220 MG/1
81 TABLET, FILM COATED ORAL DAILY
Status: DISCONTINUED | OUTPATIENT
Start: 2021-03-23 | End: 2021-03-24 | Stop reason: HOSPADM

## 2021-03-23 RX ORDER — MAGNESIUM SULFATE 1 G/100ML
1 INJECTION INTRAVENOUS
Status: COMPLETED | OUTPATIENT
Start: 2021-03-23 | End: 2021-03-23

## 2021-03-23 RX ADMIN — PROMETHAZINE HYDROCHLORIDE 6.25 MG: 25 INJECTION INTRAMUSCULAR; INTRAVENOUS at 11:03

## 2021-03-23 RX ADMIN — HYDRALAZINE HYDROCHLORIDE 10 MG: 20 INJECTION INTRAMUSCULAR; INTRAVENOUS at 03:03

## 2021-03-23 RX ADMIN — ONDANSETRON 4 MG: 2 INJECTION INTRAMUSCULAR; INTRAVENOUS at 03:03

## 2021-03-23 RX ADMIN — APIXABAN 5 MG: 5 TABLET, FILM COATED ORAL at 08:03

## 2021-03-23 RX ADMIN — PIPERACILLIN AND TAZOBACTAM 4.5 G: 4; .5 INJECTION, POWDER, LYOPHILIZED, FOR SOLUTION INTRAVENOUS; PARENTERAL at 06:03

## 2021-03-23 RX ADMIN — SODIUM CHLORIDE: 0.9 INJECTION, SOLUTION INTRAVENOUS at 05:03

## 2021-03-23 RX ADMIN — POTASSIUM CHLORIDE 10 MEQ: 7.46 INJECTION, SOLUTION INTRAVENOUS at 04:03

## 2021-03-23 RX ADMIN — POTASSIUM CHLORIDE 10 MEQ: 7.46 INJECTION, SOLUTION INTRAVENOUS at 12:03

## 2021-03-23 RX ADMIN — POTASSIUM CHLORIDE 10 MEQ: 7.46 INJECTION, SOLUTION INTRAVENOUS at 08:03

## 2021-03-23 RX ADMIN — SODIUM CHLORIDE 1000 ML: 0.9 INJECTION, SOLUTION INTRAVENOUS at 03:03

## 2021-03-23 RX ADMIN — SCOPALAMINE 1 PATCH: 1 PATCH, EXTENDED RELEASE TRANSDERMAL at 10:03

## 2021-03-23 RX ADMIN — MAGNESIUM SULFATE HEPTAHYDRATE 1 G: 1 INJECTION, SOLUTION INTRAVENOUS at 05:03

## 2021-03-23 RX ADMIN — ACETAMINOPHEN 500 MG: 500 TABLET ORAL at 11:03

## 2021-03-23 RX ADMIN — HYDRALAZINE HYDROCHLORIDE 10 MG: 20 INJECTION INTRAMUSCULAR; INTRAVENOUS at 09:03

## 2021-03-23 RX ADMIN — PROMETHAZINE HYDROCHLORIDE 6.25 MG: 25 INJECTION INTRAMUSCULAR; INTRAVENOUS at 05:03

## 2021-03-23 RX ADMIN — INSULIN ASPART 1 UNITS: 100 INJECTION, SOLUTION INTRAVENOUS; SUBCUTANEOUS at 09:03

## 2021-03-23 RX ADMIN — PROMETHAZINE HYDROCHLORIDE 12.5 MG: 25 INJECTION INTRAMUSCULAR; INTRAVENOUS at 04:03

## 2021-03-23 RX ADMIN — ONDANSETRON 4 MG: 2 INJECTION INTRAMUSCULAR; INTRAVENOUS at 06:03

## 2021-03-23 RX ADMIN — SODIUM CHLORIDE, SODIUM LACTATE, POTASSIUM CHLORIDE, AND CALCIUM CHLORIDE: .6; .31; .03; .02 INJECTION, SOLUTION INTRAVENOUS at 06:03

## 2021-03-24 VITALS
HEART RATE: 99 BPM | SYSTOLIC BLOOD PRESSURE: 126 MMHG | OXYGEN SATURATION: 98 % | WEIGHT: 190.06 LBS | HEIGHT: 65 IN | BODY MASS INDEX: 31.67 KG/M2 | RESPIRATION RATE: 18 BRPM | TEMPERATURE: 99 F | DIASTOLIC BLOOD PRESSURE: 58 MMHG

## 2021-03-24 LAB
ALBUMIN SERPL BCP-MCNC: 1.8 G/DL (ref 3.5–5.2)
ALP SERPL-CCNC: 103 U/L (ref 55–135)
ALT SERPL W/O P-5'-P-CCNC: 5 U/L (ref 10–44)
ANION GAP SERPL CALC-SCNC: 13 MMOL/L (ref 8–16)
AST SERPL-CCNC: 9 U/L (ref 10–40)
BILIRUB SERPL-MCNC: 0.5 MG/DL (ref 0.1–1)
BUN SERPL-MCNC: 24 MG/DL (ref 6–20)
CALCIUM SERPL-MCNC: 8.8 MG/DL (ref 8.7–10.5)
CHLORIDE SERPL-SCNC: 103 MMOL/L (ref 95–110)
CO2 SERPL-SCNC: 20 MMOL/L (ref 23–29)
CREAT SERPL-MCNC: 1.4 MG/DL (ref 0.5–1.4)
EST. GFR  (AFRICAN AMERICAN): 49 ML/MIN/1.73 M^2
EST. GFR  (NON AFRICAN AMERICAN): 43 ML/MIN/1.73 M^2
GLUCOSE SERPL-MCNC: 212 MG/DL (ref 70–110)
POCT GLUCOSE: 165 MG/DL (ref 70–110)
POCT GLUCOSE: 206 MG/DL (ref 70–110)
POTASSIUM SERPL-SCNC: 3.6 MMOL/L (ref 3.5–5.1)
PROT SERPL-MCNC: 7.3 G/DL (ref 6–8.4)
SODIUM SERPL-SCNC: 136 MMOL/L (ref 136–145)

## 2021-03-24 PROCEDURE — 99213 OFFICE O/P EST LOW 20 MIN: CPT | Mod: ,,, | Performed by: PODIATRIST

## 2021-03-24 PROCEDURE — 99213 PR OFFICE/OUTPT VISIT, EST, LEVL III, 20-29 MIN: ICD-10-PCS | Mod: ,,, | Performed by: PODIATRIST

## 2021-03-24 PROCEDURE — 36415 COLL VENOUS BLD VENIPUNCTURE: CPT | Performed by: NURSE PRACTITIONER

## 2021-03-24 PROCEDURE — 25000003 PHARM REV CODE 250: Performed by: NURSE PRACTITIONER

## 2021-03-24 PROCEDURE — 80053 COMPREHEN METABOLIC PANEL: CPT | Performed by: NURSE PRACTITIONER

## 2021-03-24 PROCEDURE — 25000003 PHARM REV CODE 250: Performed by: HOSPITALIST

## 2021-03-24 PROCEDURE — 96372 THER/PROPH/DIAG INJ SC/IM: CPT | Mod: 59

## 2021-03-24 PROCEDURE — 96376 TX/PRO/DX INJ SAME DRUG ADON: CPT

## 2021-03-24 PROCEDURE — G0378 HOSPITAL OBSERVATION PER HR: HCPCS

## 2021-03-24 PROCEDURE — 63600175 PHARM REV CODE 636 W HCPCS: Performed by: NURSE PRACTITIONER

## 2021-03-24 RX ORDER — AMOXICILLIN AND CLAVULANATE POTASSIUM 875; 125 MG/1; MG/1
1 TABLET, FILM COATED ORAL EVERY 12 HOURS
Status: DISCONTINUED | OUTPATIENT
Start: 2021-03-24 | End: 2021-03-24 | Stop reason: HOSPADM

## 2021-03-24 RX ORDER — CALCIUM CARBONATE 200(500)MG
500 TABLET,CHEWABLE ORAL DAILY PRN
Status: DISCONTINUED | OUTPATIENT
Start: 2021-03-24 | End: 2021-03-24 | Stop reason: HOSPADM

## 2021-03-24 RX ADMIN — ASPIRIN 81 MG: 81 TABLET, CHEWABLE ORAL at 09:03

## 2021-03-24 RX ADMIN — INSULIN ASPART 2 UNITS: 100 INJECTION, SOLUTION INTRAVENOUS; SUBCUTANEOUS at 09:03

## 2021-03-24 RX ADMIN — CALCIUM CARBONATE (ANTACID) CHEW TAB 500 MG 500 MG: 500 CHEW TAB at 02:03

## 2021-03-24 RX ADMIN — ATORVASTATIN CALCIUM 80 MG: 40 TABLET, FILM COATED ORAL at 09:03

## 2021-03-24 RX ADMIN — CARVEDILOL 6.25 MG: 6.25 TABLET, FILM COATED ORAL at 09:03

## 2021-03-24 RX ADMIN — LOSARTAN POTASSIUM 50 MG: 25 TABLET, FILM COATED ORAL at 09:03

## 2021-03-24 RX ADMIN — PIPERACILLIN AND TAZOBACTAM 4.5 G: 4; .5 INJECTION, POWDER, LYOPHILIZED, FOR SOLUTION INTRAVENOUS; PARENTERAL at 02:03

## 2021-03-24 RX ADMIN — MINOCYCLINE HYDROCHLORIDE 100 MG: 50 CAPSULE ORAL at 09:03

## 2021-03-24 RX ADMIN — CALCIUM CARBONATE (ANTACID) CHEW TAB 500 MG 500 MG: 500 CHEW TAB at 09:03

## 2021-03-24 RX ADMIN — APIXABAN 5 MG: 5 TABLET, FILM COATED ORAL at 09:03

## 2021-03-24 RX ADMIN — AMOXICILLIN AND CLAVULANATE POTASSIUM 1 TABLET: 875; 125 TABLET, FILM COATED ORAL at 09:03

## 2021-03-24 RX ADMIN — CLOPIDOGREL 75 MG: 75 TABLET, FILM COATED ORAL at 09:03

## 2021-03-26 ENCOUNTER — HOSPITAL ENCOUNTER (OUTPATIENT)
Dept: WOUND CARE | Facility: HOSPITAL | Age: 56
Discharge: HOME | End: 2021-03-26
Attending: PODIATRIST
Payer: MEDICARE

## 2021-03-26 VITALS — TEMPERATURE: 98 F | SYSTOLIC BLOOD PRESSURE: 137 MMHG | HEART RATE: 103 BPM | DIASTOLIC BLOOD PRESSURE: 86 MMHG

## 2021-03-26 DIAGNOSIS — E11.621 DIABETIC ULCER OF LEFT HEEL ASSOCIATED WITH TYPE 2 DIABETES MELLITUS, WITH BONE INVOLVEMENT WITHOUT EVIDENCE OF NECROSIS: Primary | ICD-10-CM

## 2021-03-26 DIAGNOSIS — L97.426 DIABETIC ULCER OF LEFT HEEL ASSOCIATED WITH TYPE 2 DIABETES MELLITUS, WITH BONE INVOLVEMENT WITHOUT EVIDENCE OF NECROSIS: Primary | ICD-10-CM

## 2021-03-26 PROCEDURE — 97605 NEG PRS WND THER DME<=50SQCM: CPT

## 2021-03-26 PROCEDURE — 99213 PR OFFICE/OUTPT VISIT, EST, LEVL III, 20-29 MIN: ICD-10-PCS | Mod: ,,, | Performed by: PODIATRIST

## 2021-03-26 PROCEDURE — 99213 OFFICE O/P EST LOW 20 MIN: CPT | Mod: ,,, | Performed by: PODIATRIST

## 2021-03-26 RX ORDER — OXYCODONE AND ACETAMINOPHEN 10; 325 MG/1; MG/1
1 TABLET ORAL EVERY 6 HOURS PRN
Qty: 28 TABLET | Refills: 0 | Status: SHIPPED | OUTPATIENT
Start: 2021-03-26 | End: 2021-04-07 | Stop reason: SDUPTHER

## 2021-03-29 ENCOUNTER — OFFICE VISIT (OUTPATIENT)
Dept: INTERNAL MEDICINE | Facility: CLINIC | Age: 56
End: 2021-03-29
Payer: MEDICARE

## 2021-03-29 VITALS
SYSTOLIC BLOOD PRESSURE: 138 MMHG | BODY MASS INDEX: 29.97 KG/M2 | DIASTOLIC BLOOD PRESSURE: 88 MMHG | OXYGEN SATURATION: 99 % | WEIGHT: 179.88 LBS | HEIGHT: 65 IN | HEART RATE: 54 BPM

## 2021-03-29 DIAGNOSIS — Z09 HOSPITAL DISCHARGE FOLLOW-UP: Primary | ICD-10-CM

## 2021-03-29 DIAGNOSIS — I10 ESSENTIAL HYPERTENSION: ICD-10-CM

## 2021-03-29 DIAGNOSIS — I73.9 PERIPHERAL VASCULAR DISEASE: ICD-10-CM

## 2021-03-29 PROBLEM — R11.10 HYPEREMESIS: Status: RESOLVED | Noted: 2021-03-23 | Resolved: 2021-03-29

## 2021-03-29 PROBLEM — E87.6 HYPOKALEMIA: Status: RESOLVED | Noted: 2021-03-23 | Resolved: 2021-03-29

## 2021-03-29 PROCEDURE — 99213 PR OFFICE/OUTPT VISIT, EST, LEVL III, 20-29 MIN: ICD-10-PCS | Mod: S$PBB,,, | Performed by: FAMILY MEDICINE

## 2021-03-29 PROCEDURE — 99213 OFFICE O/P EST LOW 20 MIN: CPT | Mod: S$PBB,,, | Performed by: FAMILY MEDICINE

## 2021-03-29 PROCEDURE — 99999 PR PBB SHADOW E&M-EST. PATIENT-LVL V: ICD-10-PCS | Mod: PBBFAC,,, | Performed by: FAMILY MEDICINE

## 2021-03-29 PROCEDURE — 99999 PR PBB SHADOW E&M-EST. PATIENT-LVL V: CPT | Mod: PBBFAC,,, | Performed by: FAMILY MEDICINE

## 2021-03-29 PROCEDURE — 99215 OFFICE O/P EST HI 40 MIN: CPT | Mod: PBBFAC | Performed by: FAMILY MEDICINE

## 2021-03-30 ENCOUNTER — IMMUNIZATION (OUTPATIENT)
Dept: PRIMARY CARE CLINIC | Facility: CLINIC | Age: 56
End: 2021-03-30
Payer: MEDICARE

## 2021-03-30 DIAGNOSIS — Z23 NEED FOR VACCINATION: Primary | ICD-10-CM

## 2021-03-30 PROCEDURE — 91300 COVID-19, MRNA, LNP-S, PF, 30 MCG/0.3 ML DOSE VACCINE: CPT | Mod: S$GLB,,, | Performed by: INTERNAL MEDICINE

## 2021-03-30 PROCEDURE — 0002A COVID-19, MRNA, LNP-S, PF, 30 MCG/0.3 ML DOSE VACCINE: CPT | Mod: CV19,S$GLB,, | Performed by: INTERNAL MEDICINE

## 2021-03-30 PROCEDURE — 91300 COVID-19, MRNA, LNP-S, PF, 30 MCG/0.3 ML DOSE VACCINE: ICD-10-PCS | Mod: S$GLB,,, | Performed by: INTERNAL MEDICINE

## 2021-03-30 PROCEDURE — 0002A COVID-19, MRNA, LNP-S, PF, 30 MCG/0.3 ML DOSE VACCINE: ICD-10-PCS | Mod: CV19,S$GLB,, | Performed by: INTERNAL MEDICINE

## 2021-03-31 ENCOUNTER — HOSPITAL ENCOUNTER (OUTPATIENT)
Dept: WOUND CARE | Facility: HOSPITAL | Age: 56
Discharge: HOME | End: 2021-03-31
Attending: PODIATRIST
Payer: MEDICARE

## 2021-03-31 VITALS
HEIGHT: 65 IN | HEART RATE: 89 BPM | WEIGHT: 179.88 LBS | BODY MASS INDEX: 29.97 KG/M2 | DIASTOLIC BLOOD PRESSURE: 62 MMHG | SYSTOLIC BLOOD PRESSURE: 125 MMHG | TEMPERATURE: 98 F

## 2021-03-31 DIAGNOSIS — L97.426 DIABETIC ULCER OF LEFT HEEL ASSOCIATED WITH TYPE 2 DIABETES MELLITUS, WITH BONE INVOLVEMENT WITHOUT EVIDENCE OF NECROSIS: Primary | ICD-10-CM

## 2021-03-31 DIAGNOSIS — E11.621 DIABETIC ULCER OF LEFT HEEL ASSOCIATED WITH TYPE 2 DIABETES MELLITUS, WITH BONE INVOLVEMENT WITHOUT EVIDENCE OF NECROSIS: Primary | ICD-10-CM

## 2021-03-31 DIAGNOSIS — M86.172 ACUTE OSTEOMYELITIS OF LEFT CALCANEUS: ICD-10-CM

## 2021-03-31 PROCEDURE — 11044 DEBRIDEMENT: ICD-10-PCS | Mod: ,,, | Performed by: FAMILY MEDICINE

## 2021-03-31 PROCEDURE — 11044 DBRDMT BONE 1ST 20 SQ CM/<: CPT | Mod: ,,, | Performed by: FAMILY MEDICINE

## 2021-03-31 PROCEDURE — 11044 DBRDMT BONE 1ST 20 SQ CM/<: CPT | Performed by: FAMILY MEDICINE

## 2021-03-31 PROCEDURE — 99214 PR OFFICE/OUTPT VISIT, EST, LEVL IV, 30-39 MIN: ICD-10-PCS | Mod: 25,,, | Performed by: FAMILY MEDICINE

## 2021-03-31 PROCEDURE — 11042 DBRDMT SUBQ TIS 1ST 20SQCM/<: CPT | Performed by: FAMILY MEDICINE

## 2021-03-31 PROCEDURE — 27201912 HC WOUND CARE DEBRIDEMENT SUPPLIES: Performed by: FAMILY MEDICINE

## 2021-03-31 PROCEDURE — 99214 OFFICE O/P EST MOD 30 MIN: CPT | Mod: 25,,, | Performed by: FAMILY MEDICINE

## 2021-04-05 ENCOUNTER — PATIENT MESSAGE (OUTPATIENT)
Dept: ADMINISTRATIVE | Facility: HOSPITAL | Age: 56
End: 2021-04-05

## 2021-04-07 ENCOUNTER — HOSPITAL ENCOUNTER (OUTPATIENT)
Dept: WOUND CARE | Facility: HOSPITAL | Age: 56
Discharge: HOME | End: 2021-04-07
Attending: FAMILY MEDICINE
Payer: MEDICARE

## 2021-04-07 VITALS — DIASTOLIC BLOOD PRESSURE: 70 MMHG | SYSTOLIC BLOOD PRESSURE: 145 MMHG | HEART RATE: 91 BPM | TEMPERATURE: 98 F

## 2021-04-07 DIAGNOSIS — I73.9 PAD (PERIPHERAL ARTERY DISEASE): ICD-10-CM

## 2021-04-07 DIAGNOSIS — L97.426 DIABETIC ULCER OF LEFT HEEL ASSOCIATED WITH TYPE 2 DIABETES MELLITUS, WITH BONE INVOLVEMENT WITHOUT EVIDENCE OF NECROSIS: Primary | ICD-10-CM

## 2021-04-07 DIAGNOSIS — E11.621 DIABETIC ULCER OF LEFT HEEL ASSOCIATED WITH TYPE 2 DIABETES MELLITUS, WITH BONE INVOLVEMENT WITHOUT EVIDENCE OF NECROSIS: Primary | ICD-10-CM

## 2021-04-07 PROCEDURE — 99213 OFFICE O/P EST LOW 20 MIN: CPT

## 2021-04-07 RX ORDER — OXYCODONE AND ACETAMINOPHEN 10; 325 MG/1; MG/1
1 TABLET ORAL EVERY 6 HOURS PRN
Qty: 28 TABLET | Refills: 0 | Status: SHIPPED | OUTPATIENT
Start: 2021-04-07 | End: 2021-04-21 | Stop reason: SDUPTHER

## 2021-04-08 ENCOUNTER — HOSPITAL ENCOUNTER (OUTPATIENT)
Dept: CARDIOLOGY | Facility: HOSPITAL | Age: 56
Discharge: HOME | End: 2021-04-08
Attending: SURGERY
Payer: MEDICARE

## 2021-04-08 ENCOUNTER — OFFICE VISIT (OUTPATIENT)
Dept: VASCULAR SURGERY | Facility: CLINIC | Age: 56
End: 2021-04-08
Payer: MEDICARE

## 2021-04-08 VITALS
DIASTOLIC BLOOD PRESSURE: 90 MMHG | WEIGHT: 195.13 LBS | BODY MASS INDEX: 32.51 KG/M2 | HEIGHT: 65 IN | SYSTOLIC BLOOD PRESSURE: 162 MMHG

## 2021-04-08 DIAGNOSIS — E11.65 UNCONTROLLED TYPE 2 DIABETES MELLITUS WITH HYPERGLYCEMIA: ICD-10-CM

## 2021-04-08 DIAGNOSIS — I70.229 CRITICAL LOWER LIMB ISCHEMIA: Primary | ICD-10-CM

## 2021-04-08 PROCEDURE — 93926 LOWER EXTREMITY STUDY: CPT | Mod: LT

## 2021-04-08 PROCEDURE — 93926 LOWER EXTREMITY STUDY: CPT | Mod: 26,LT,, | Performed by: SURGERY

## 2021-04-08 PROCEDURE — 99999 PR PBB SHADOW E&M-EST. PATIENT-LVL V: ICD-10-PCS | Mod: PBBFAC,,, | Performed by: SURGERY

## 2021-04-08 PROCEDURE — 99215 OFFICE O/P EST HI 40 MIN: CPT | Mod: PBBFAC,25 | Performed by: SURGERY

## 2021-04-08 PROCEDURE — 93922 ANKLE BRACHIAL INDICES (ABI): ICD-10-PCS | Mod: 26,,, | Performed by: SURGERY

## 2021-04-08 PROCEDURE — 99999 PR PBB SHADOW E&M-EST. PATIENT-LVL V: CPT | Mod: PBBFAC,,, | Performed by: SURGERY

## 2021-04-08 PROCEDURE — 93922 UPR/L XTREMITY ART 2 LEVELS: CPT | Mod: 26,,, | Performed by: SURGERY

## 2021-04-08 PROCEDURE — 93926 CV US DOPPLER ARTERIAL LEG LEFT (CUPID ONLY): ICD-10-PCS | Mod: 26,LT,, | Performed by: SURGERY

## 2021-04-08 PROCEDURE — 99024 POSTOP FOLLOW-UP VISIT: CPT | Mod: POP,,, | Performed by: SURGERY

## 2021-04-08 PROCEDURE — 99024 PR POST-OP FOLLOW-UP VISIT: ICD-10-PCS | Mod: POP,,, | Performed by: SURGERY

## 2021-04-08 PROCEDURE — 93922 UPR/L XTREMITY ART 2 LEVELS: CPT

## 2021-04-09 ENCOUNTER — PATIENT OUTREACH (OUTPATIENT)
Dept: ADMINISTRATIVE | Facility: OTHER | Age: 56
End: 2021-04-09

## 2021-04-09 ENCOUNTER — HOSPITAL ENCOUNTER (OUTPATIENT)
Dept: WOUND CARE | Facility: HOSPITAL | Age: 56
Discharge: HOME | End: 2021-04-09
Attending: PODIATRIST
Payer: MEDICARE

## 2021-04-09 ENCOUNTER — TELEPHONE (OUTPATIENT)
Dept: VASCULAR SURGERY | Facility: CLINIC | Age: 56
End: 2021-04-09

## 2021-04-09 VITALS
RESPIRATION RATE: 20 BRPM | SYSTOLIC BLOOD PRESSURE: 143 MMHG | DIASTOLIC BLOOD PRESSURE: 68 MMHG | TEMPERATURE: 98 F | HEART RATE: 94 BPM

## 2021-04-09 DIAGNOSIS — Z95.1 S/P CABG X 1: Primary | ICD-10-CM

## 2021-04-09 DIAGNOSIS — E11.621 DIABETIC ULCER OF LEFT HEEL ASSOCIATED WITH TYPE 2 DIABETES MELLITUS, WITH FAT LAYER EXPOSED: Primary | ICD-10-CM

## 2021-04-09 DIAGNOSIS — L97.422 DIABETIC ULCER OF LEFT HEEL ASSOCIATED WITH TYPE 2 DIABETES MELLITUS, WITH FAT LAYER EXPOSED: Primary | ICD-10-CM

## 2021-04-09 LAB
LEFT ABI: 1.62
LEFT ANT TIBIAL SYS PSV: 47 CM/S
LEFT CFA PSV: 128 CM/S
LEFT DORSALIS PEDIS: 255 MMHG
LEFT EXTERNAL ILIAC PSV: 104 CM/S
LEFT PERONEAL SYS PSV: 28 CM/S
LEFT POPLITEAL PSV: 90 CM/S
LEFT POST TIBIAL SYS PSV: 0 CM/S
LEFT PROFUNDA SYS PSV: 285 CM/S
LEFT TBI: 0
LEFT TIB/PER TRUNK SYS PSV: 94 CM/S
LEFT TOE PRESSURE: 0 MMHG
OHS CV LEFT LOWER EXTREMITY ABI (NO CALC): 1.32
OHS CV LOWER EXTREMITY GRAFT MEASUREMENTS - LEFT - G1 - D: 0
OHS CV LOWER EXTREMITY GRAFT MEASUREMENTS - LEFT - G1 - DA: 0
OHS CV LOWER EXTREMITY GRAFT MEASUREMENTS - LEFT - G1 - M: 0
OHS CV LOWER EXTREMITY GRAFT MEASUREMENTS - LEFT - G1 - P: 0
OHS CV LOWER EXTREMITY GRAFT MEASUREMENTS - LEFT - G1 - PA: 0
RIGHT ABI: 0.67
RIGHT ARM BP: 157 MMHG
RIGHT DORSALIS PEDIS: 98 MMHG
RIGHT POSTERIOR TIBIAL: 105 MMHG
RIGHT TBI: 0.13
RIGHT TOE PRESSURE: 20 MMHG

## 2021-04-09 PROCEDURE — 11042 DBRDMT SUBQ TIS 1ST 20SQCM/<: CPT | Performed by: PODIATRIST

## 2021-04-09 PROCEDURE — 11042 WOUND DEBRIDEMENT: ICD-10-PCS | Mod: ,,, | Performed by: PODIATRIST

## 2021-04-09 PROCEDURE — 27201912 HC WOUND CARE DEBRIDEMENT SUPPLIES: Performed by: PODIATRIST

## 2021-04-09 PROCEDURE — 11042 DBRDMT SUBQ TIS 1ST 20SQCM/<: CPT | Mod: ,,, | Performed by: PODIATRIST

## 2021-04-09 PROCEDURE — 99499 NO LOS: ICD-10-PCS | Mod: ,,, | Performed by: PODIATRIST

## 2021-04-09 PROCEDURE — 99499 UNLISTED E&M SERVICE: CPT | Mod: ,,, | Performed by: PODIATRIST

## 2021-04-12 ENCOUNTER — TELEPHONE (OUTPATIENT)
Dept: VASCULAR SURGERY | Facility: CLINIC | Age: 56
End: 2021-04-12

## 2021-04-12 LAB — FUNGUS SPEC CULT: NORMAL

## 2021-04-13 ENCOUNTER — OFFICE VISIT (OUTPATIENT)
Dept: ENDOCRINOLOGY | Facility: CLINIC | Age: 56
End: 2021-04-13
Payer: MEDICARE

## 2021-04-13 VITALS
SYSTOLIC BLOOD PRESSURE: 130 MMHG | BODY MASS INDEX: 31.99 KG/M2 | DIASTOLIC BLOOD PRESSURE: 82 MMHG | WEIGHT: 192 LBS | HEART RATE: 73 BPM | HEIGHT: 65 IN

## 2021-04-13 DIAGNOSIS — Z79.4 TYPE 2 DIABETES MELLITUS WITH DIABETIC NEPHROPATHY, WITH LONG-TERM CURRENT USE OF INSULIN: Primary | ICD-10-CM

## 2021-04-13 DIAGNOSIS — E78.2 MIXED HYPERLIPIDEMIA: ICD-10-CM

## 2021-04-13 DIAGNOSIS — I25.10 CORONARY ARTERY DISEASE INVOLVING NATIVE CORONARY ARTERY OF NATIVE HEART WITHOUT ANGINA PECTORIS: ICD-10-CM

## 2021-04-13 DIAGNOSIS — E11.21 TYPE 2 DIABETES MELLITUS WITH DIABETIC NEPHROPATHY, WITH LONG-TERM CURRENT USE OF INSULIN: Primary | ICD-10-CM

## 2021-04-13 DIAGNOSIS — I50.32 CHRONIC DIASTOLIC HEART FAILURE: ICD-10-CM

## 2021-04-13 DIAGNOSIS — E11.65 TYPE 2 DIABETES MELLITUS WITH HYPERGLYCEMIA, WITH LONG-TERM CURRENT USE OF INSULIN: ICD-10-CM

## 2021-04-13 DIAGNOSIS — I10 ESSENTIAL HYPERTENSION: ICD-10-CM

## 2021-04-13 DIAGNOSIS — I73.9 PERIPHERAL VASCULAR DISEASE: ICD-10-CM

## 2021-04-13 DIAGNOSIS — Z79.4 TYPE 2 DIABETES MELLITUS WITH HYPERGLYCEMIA, WITH LONG-TERM CURRENT USE OF INSULIN: ICD-10-CM

## 2021-04-13 DIAGNOSIS — E11.65 UNCONTROLLED TYPE 2 DIABETES MELLITUS WITH HYPERGLYCEMIA: ICD-10-CM

## 2021-04-13 PROCEDURE — 99214 OFFICE O/P EST MOD 30 MIN: CPT | Mod: S$PBB,,, | Performed by: NURSE PRACTITIONER

## 2021-04-13 PROCEDURE — 99215 OFFICE O/P EST HI 40 MIN: CPT | Mod: PBBFAC | Performed by: NURSE PRACTITIONER

## 2021-04-13 PROCEDURE — 99999 PR PBB SHADOW E&M-EST. PATIENT-LVL V: ICD-10-PCS | Mod: PBBFAC,,, | Performed by: NURSE PRACTITIONER

## 2021-04-13 PROCEDURE — 99214 PR OFFICE/OUTPT VISIT, EST, LEVL IV, 30-39 MIN: ICD-10-PCS | Mod: S$PBB,,, | Performed by: NURSE PRACTITIONER

## 2021-04-13 PROCEDURE — 99999 PR PBB SHADOW E&M-EST. PATIENT-LVL V: CPT | Mod: PBBFAC,,, | Performed by: NURSE PRACTITIONER

## 2021-04-13 RX ORDER — INSULIN ASPART 100 [IU]/ML
5 INJECTION, SOLUTION INTRAVENOUS; SUBCUTANEOUS
Qty: 1 BOX | Refills: 3 | Status: SHIPPED | OUTPATIENT
Start: 2021-04-13 | End: 2022-04-25

## 2021-04-16 ENCOUNTER — HOSPITAL ENCOUNTER (OUTPATIENT)
Dept: WOUND CARE | Facility: HOSPITAL | Age: 56
Discharge: HOME | End: 2021-04-16
Attending: PODIATRIST
Payer: MEDICARE

## 2021-04-16 VITALS
DIASTOLIC BLOOD PRESSURE: 85 MMHG | HEART RATE: 80 BPM | RESPIRATION RATE: 20 BRPM | SYSTOLIC BLOOD PRESSURE: 186 MMHG | TEMPERATURE: 98 F

## 2021-04-16 DIAGNOSIS — L97.422 DIABETIC ULCER OF LEFT HEEL ASSOCIATED WITH TYPE 2 DIABETES MELLITUS, WITH FAT LAYER EXPOSED: Primary | ICD-10-CM

## 2021-04-16 DIAGNOSIS — E11.621 DIABETIC ULCER OF LEFT HEEL ASSOCIATED WITH TYPE 2 DIABETES MELLITUS, WITH FAT LAYER EXPOSED: Primary | ICD-10-CM

## 2021-04-16 PROCEDURE — 99499 UNLISTED E&M SERVICE: CPT | Mod: ,,, | Performed by: PODIATRIST

## 2021-04-16 PROCEDURE — 11042 DBRDMT SUBQ TIS 1ST 20SQCM/<: CPT

## 2021-04-16 PROCEDURE — 99213 OFFICE O/P EST LOW 20 MIN: CPT | Performed by: PODIATRIST

## 2021-04-16 PROCEDURE — 99499 NO LOS: ICD-10-PCS | Mod: ,,, | Performed by: PODIATRIST

## 2021-04-16 PROCEDURE — 11042 DBRDMT SUBQ TIS 1ST 20SQCM/<: CPT | Mod: ,,, | Performed by: PODIATRIST

## 2021-04-16 PROCEDURE — 11042 WOUND DEBRIDEMENT: ICD-10-PCS | Mod: ,,, | Performed by: PODIATRIST

## 2021-04-19 ENCOUNTER — ANESTHESIA EVENT (OUTPATIENT)
Dept: SURGERY | Facility: HOSPITAL | Age: 56
DRG: 239 | End: 2021-04-19
Payer: MEDICARE

## 2021-04-19 DIAGNOSIS — Z01.818 PRE-OP TESTING: ICD-10-CM

## 2021-04-20 ENCOUNTER — HOSPITAL ENCOUNTER (OUTPATIENT)
Dept: RADIOLOGY | Facility: HOSPITAL | Age: 56
Discharge: HOME | End: 2021-04-20
Attending: SURGERY
Payer: MEDICARE

## 2021-04-20 DIAGNOSIS — I70.229 CRITICAL LOWER LIMB ISCHEMIA: ICD-10-CM

## 2021-04-20 PROCEDURE — 93985 US VEIN MAPPING, HEMODIALYSIS, BILATERAL: ICD-10-PCS | Mod: 26,,, | Performed by: RADIOLOGY

## 2021-04-20 PROCEDURE — 93985 DUP-SCAN HEMO COMPL BI STD: CPT | Mod: TC

## 2021-04-20 PROCEDURE — 93985 DUP-SCAN HEMO COMPL BI STD: CPT | Mod: 26,,, | Performed by: RADIOLOGY

## 2021-04-21 ENCOUNTER — HOSPITAL ENCOUNTER (OUTPATIENT)
Dept: CARDIOLOGY | Facility: HOSPITAL | Age: 56
Discharge: HOME | End: 2021-04-21
Attending: INTERNAL MEDICINE
Payer: MEDICARE

## 2021-04-21 ENCOUNTER — OFFICE VISIT (OUTPATIENT)
Dept: CARDIOLOGY | Facility: CLINIC | Age: 56
End: 2021-04-21
Payer: MEDICARE

## 2021-04-21 ENCOUNTER — PATIENT MESSAGE (OUTPATIENT)
Dept: INTERNAL MEDICINE | Facility: CLINIC | Age: 56
End: 2021-04-21

## 2021-04-21 VITALS
SYSTOLIC BLOOD PRESSURE: 134 MMHG | OXYGEN SATURATION: 99 % | DIASTOLIC BLOOD PRESSURE: 86 MMHG | HEART RATE: 91 BPM | HEIGHT: 65 IN | WEIGHT: 198.94 LBS | BODY MASS INDEX: 33.15 KG/M2

## 2021-04-21 DIAGNOSIS — L97.426 DIABETIC ULCER OF LEFT HEEL ASSOCIATED WITH TYPE 2 DIABETES MELLITUS, WITH BONE INVOLVEMENT WITHOUT EVIDENCE OF NECROSIS: ICD-10-CM

## 2021-04-21 DIAGNOSIS — I48.0 PAROXYSMAL ATRIAL FIBRILLATION: ICD-10-CM

## 2021-04-21 DIAGNOSIS — Z95.1 S/P CABG X 1: ICD-10-CM

## 2021-04-21 DIAGNOSIS — E78.2 MIXED HYPERLIPIDEMIA: ICD-10-CM

## 2021-04-21 DIAGNOSIS — D63.8 ANEMIA, CHRONIC DISEASE: ICD-10-CM

## 2021-04-21 DIAGNOSIS — Z79.4 TYPE 2 DIABETES MELLITUS WITH HYPERGLYCEMIA, WITH LONG-TERM CURRENT USE OF INSULIN: ICD-10-CM

## 2021-04-21 DIAGNOSIS — F33.1 MDD (MAJOR DEPRESSIVE DISORDER), RECURRENT EPISODE, MODERATE: ICD-10-CM

## 2021-04-21 DIAGNOSIS — I50.32 CHRONIC DIASTOLIC HEART FAILURE: ICD-10-CM

## 2021-04-21 DIAGNOSIS — I73.9 PERIPHERAL VASCULAR DISEASE: ICD-10-CM

## 2021-04-21 DIAGNOSIS — Z79.4 TYPE 2 DIABETES MELLITUS WITHOUT COMPLICATION, WITH LONG-TERM CURRENT USE OF INSULIN: ICD-10-CM

## 2021-04-21 DIAGNOSIS — E11.621 DIABETIC ULCER OF LEFT HEEL ASSOCIATED WITH TYPE 2 DIABETES MELLITUS, WITH FAT LAYER EXPOSED: ICD-10-CM

## 2021-04-21 DIAGNOSIS — E11.65 UNCONTROLLED TYPE 2 DIABETES MELLITUS WITH HYPERGLYCEMIA: ICD-10-CM

## 2021-04-21 DIAGNOSIS — Z79.4 TYPE 2 DIABETES MELLITUS WITH DIABETIC NEPHROPATHY, WITH LONG-TERM CURRENT USE OF INSULIN: ICD-10-CM

## 2021-04-21 DIAGNOSIS — E11.621 DIABETIC ULCER OF LEFT HEEL ASSOCIATED WITH TYPE 2 DIABETES MELLITUS, WITH BONE INVOLVEMENT WITHOUT EVIDENCE OF NECROSIS: ICD-10-CM

## 2021-04-21 DIAGNOSIS — M86.172 ACUTE OSTEOMYELITIS OF LEFT CALCANEUS: ICD-10-CM

## 2021-04-21 DIAGNOSIS — R60.9 EDEMA, UNSPECIFIED TYPE: Primary | ICD-10-CM

## 2021-04-21 DIAGNOSIS — E11.65 TYPE 2 DIABETES MELLITUS WITH HYPERGLYCEMIA, WITH LONG-TERM CURRENT USE OF INSULIN: ICD-10-CM

## 2021-04-21 DIAGNOSIS — E11.21 TYPE 2 DIABETES MELLITUS WITH DIABETIC NEPHROPATHY, WITH LONG-TERM CURRENT USE OF INSULIN: ICD-10-CM

## 2021-04-21 DIAGNOSIS — E11.9 TYPE 2 DIABETES MELLITUS WITHOUT COMPLICATION, WITH LONG-TERM CURRENT USE OF INSULIN: ICD-10-CM

## 2021-04-21 DIAGNOSIS — I99.8 ISCHEMIA OF LOWER EXTREMITY: ICD-10-CM

## 2021-04-21 DIAGNOSIS — I42.0 DILATED CARDIOMYOPATHY: ICD-10-CM

## 2021-04-21 DIAGNOSIS — I25.10 CORONARY ARTERY DISEASE INVOLVING NATIVE CORONARY ARTERY OF NATIVE HEART WITHOUT ANGINA PECTORIS: ICD-10-CM

## 2021-04-21 DIAGNOSIS — I70.229 CRITICAL LOWER LIMB ISCHEMIA: ICD-10-CM

## 2021-04-21 DIAGNOSIS — L97.422 DIABETIC ULCER OF LEFT HEEL ASSOCIATED WITH TYPE 2 DIABETES MELLITUS, WITH FAT LAYER EXPOSED: ICD-10-CM

## 2021-04-21 DIAGNOSIS — Z95.828 S/P FEMORAL-POPLITEAL BYPASS SURGERY: ICD-10-CM

## 2021-04-21 DIAGNOSIS — I10 ESSENTIAL HYPERTENSION: ICD-10-CM

## 2021-04-21 DIAGNOSIS — R60.9 EDEMA, UNSPECIFIED TYPE: ICD-10-CM

## 2021-04-21 DIAGNOSIS — I73.9 PAD (PERIPHERAL ARTERY DISEASE): ICD-10-CM

## 2021-04-21 LAB
ASCENDING AORTA: 2.8 CM
AV INDEX (PROSTH): 0.59
AV MEAN GRADIENT: 6 MMHG
AV PEAK GRADIENT: 9 MMHG
AV VALVE AREA: 1.61 CM2
AV VELOCITY RATIO: 0.59
BSA FOR ECHO PROCEDURE: 2.03 M2
CV ECHO LV RWT: 0.5 CM
DOP CALC AO PEAK VEL: 1.5 M/S
DOP CALC AO VTI: 33.43 CM
DOP CALC LVOT AREA: 2.7 CM2
DOP CALC LVOT DIAMETER: 1.87 CM
DOP CALC LVOT PEAK VEL: 0.88 M/S
DOP CALC LVOT STROKE VOLUME: 53.86 CM3
DOP CALC RVOT PEAK VEL: 0.79 M/S
DOP CALC RVOT VTI: 16.07 CM
DOP CALCLVOT PEAK VEL VTI: 19.62 CM
E WAVE DECELERATION TIME: 126.44 MSEC
E/A RATIO: 0.96
E/E' RATIO: 11.13 M/S
ECHO LV POSTERIOR WALL: 1.26 CM (ref 0.6–1.1)
EJECTION FRACTION: 50 %
FRACTIONAL SHORTENING: 14 % (ref 28–44)
INTERVENTRICULAR SEPTUM: 1.29 CM (ref 0.6–1.1)
IVRT: 119.95 MSEC
LA MAJOR: 6.28 CM
LA MINOR: 5.52 CM
LA WIDTH: 6.2 CM
LEFT ATRIUM SIZE: 4.85 CM
LEFT ATRIUM VOLUME INDEX: 76.2 ML/M2
LEFT ATRIUM VOLUME: 150.18 CM3
LEFT INTERNAL DIMENSION IN SYSTOLE: 4.38 CM (ref 2.1–4)
LEFT VENTRICLE DIASTOLIC VOLUME INDEX: 62.59 ML/M2
LEFT VENTRICLE DIASTOLIC VOLUME: 123.3 ML
LEFT VENTRICLE MASS INDEX: 133 G/M2
LEFT VENTRICLE SYSTOLIC VOLUME INDEX: 44 ML/M2
LEFT VENTRICLE SYSTOLIC VOLUME: 86.74 ML
LEFT VENTRICULAR INTERNAL DIMENSION IN DIASTOLE: 5.09 CM (ref 3.5–6)
LEFT VENTRICULAR MASS: 261.91 G
LV LATERAL E/E' RATIO: 8.9 M/S
LV SEPTAL E/E' RATIO: 14.83 M/S
MV MEAN GRADIENT: 1 MMHG
MV PEAK A VEL: 0.93 M/S
MV PEAK E VEL: 0.89 M/S
MV PEAK GRADIENT: 4 MMHG
MV STENOSIS PRESSURE HALF TIME: 36.67 MS
MV VALVE AREA P 1/2 METHOD: 6 CM2
PISA MRMAX VEL: 0.05 M/S
PISA TR MAX VEL: 3.33 M/S
PULM VEIN S/D RATIO: 0.69
PV MEAN GRADIENT: 2 MMHG
PV PEAK D VEL: 0.93 M/S
PV PEAK S VEL: 0.64 M/S
PV PEAK VELOCITY: 0.86 CM/S
RA MAJOR: 5.58 CM
RA PRESSURE: 3 MMHG
RA WIDTH: 4.56 CM
RIGHT VENTRICULAR END-DIASTOLIC DIMENSION: 4.74 CM
RV TISSUE DOPPLER FREE WALL SYSTOLIC VELOCITY 1 (APICAL 4 CHAMBER VIEW): 7.85 CM/S
SINUS: 2.76 CM
STJ: 2.58 CM
TDI LATERAL: 0.1 M/S
TDI SEPTAL: 0.06 M/S
TDI: 0.08 M/S
TR MAX PG: 44 MMHG
TRICUSPID ANNULAR PLANE SYSTOLIC EXCURSION: 1.95 CM
TV REST PULMONARY ARTERY PRESSURE: 47 MMHG

## 2021-04-21 PROCEDURE — 93010 EKG 12-LEAD: ICD-10-PCS | Mod: S$PBB,,, | Performed by: INTERNAL MEDICINE

## 2021-04-21 PROCEDURE — 99999 PR PBB SHADOW E&M-EST. PATIENT-LVL IV: ICD-10-PCS | Mod: PBBFAC,,, | Performed by: INTERNAL MEDICINE

## 2021-04-21 PROCEDURE — 99999 PR PBB SHADOW E&M-EST. PATIENT-LVL IV: CPT | Mod: PBBFAC,,, | Performed by: INTERNAL MEDICINE

## 2021-04-21 PROCEDURE — 93005 ELECTROCARDIOGRAM TRACING: CPT | Mod: PBBFAC | Performed by: INTERNAL MEDICINE

## 2021-04-21 PROCEDURE — 93010 ELECTROCARDIOGRAM REPORT: CPT | Mod: S$PBB,,, | Performed by: INTERNAL MEDICINE

## 2021-04-21 PROCEDURE — 99214 OFFICE O/P EST MOD 30 MIN: CPT | Mod: PBBFAC,25 | Performed by: INTERNAL MEDICINE

## 2021-04-21 PROCEDURE — 99215 OFFICE O/P EST HI 40 MIN: CPT | Mod: S$PBB,,, | Performed by: INTERNAL MEDICINE

## 2021-04-21 PROCEDURE — 99215 PR OFFICE/OUTPT VISIT, EST, LEVL V, 40-54 MIN: ICD-10-PCS | Mod: S$PBB,,, | Performed by: INTERNAL MEDICINE

## 2021-04-21 PROCEDURE — 93306 TTE W/DOPPLER COMPLETE: CPT

## 2021-04-21 PROCEDURE — 93306 ECHO (CUPID ONLY): ICD-10-PCS | Mod: 26,,, | Performed by: INTERNAL MEDICINE

## 2021-04-21 PROCEDURE — 93306 TTE W/DOPPLER COMPLETE: CPT | Mod: 26,,, | Performed by: INTERNAL MEDICINE

## 2021-04-21 RX ORDER — OXYCODONE AND ACETAMINOPHEN 10; 325 MG/1; MG/1
1 TABLET ORAL EVERY 6 HOURS PRN
Qty: 28 TABLET | Refills: 0 | Status: ON HOLD | OUTPATIENT
Start: 2021-04-21 | End: 2021-06-08 | Stop reason: HOSPADM

## 2021-04-22 ENCOUNTER — HOSPITAL ENCOUNTER (OUTPATIENT)
Dept: PREADMISSION TESTING | Facility: HOSPITAL | Age: 56
Discharge: HOME | End: 2021-04-22
Attending: SURGERY
Payer: MEDICARE

## 2021-04-22 VITALS
RESPIRATION RATE: 18 BRPM | HEART RATE: 84 BPM | DIASTOLIC BLOOD PRESSURE: 93 MMHG | SYSTOLIC BLOOD PRESSURE: 184 MMHG | HEIGHT: 65 IN | BODY MASS INDEX: 33.13 KG/M2 | OXYGEN SATURATION: 100 % | WEIGHT: 198.88 LBS | TEMPERATURE: 97 F

## 2021-04-22 DIAGNOSIS — I70.229 CRITICAL LOWER LIMB ISCHEMIA: ICD-10-CM

## 2021-04-22 LAB
ANION GAP SERPL CALC-SCNC: 4 MMOL/L (ref 8–16)
APTT BLDCRRT: 27.6 SEC (ref 21–32)
BASOPHILS # BLD AUTO: 0.07 K/UL (ref 0–0.2)
BASOPHILS NFR BLD: 0.8 % (ref 0–1.9)
BUN SERPL-MCNC: 16 MG/DL (ref 6–20)
CALCIUM SERPL-MCNC: 8.5 MG/DL (ref 8.7–10.5)
CHLORIDE SERPL-SCNC: 109 MMOL/L (ref 95–110)
CO2 SERPL-SCNC: 27 MMOL/L (ref 23–29)
CREAT SERPL-MCNC: 1.5 MG/DL (ref 0.5–1.4)
DIFFERENTIAL METHOD: ABNORMAL
EOSINOPHIL # BLD AUTO: 0.3 K/UL (ref 0–0.5)
EOSINOPHIL NFR BLD: 3.2 % (ref 0–8)
ERYTHROCYTE [DISTWIDTH] IN BLOOD BY AUTOMATED COUNT: 15.6 % (ref 11.5–14.5)
EST. GFR  (AFRICAN AMERICAN): 45 ML/MIN/1.73 M^2
EST. GFR  (NON AFRICAN AMERICAN): 39 ML/MIN/1.73 M^2
GLUCOSE SERPL-MCNC: 102 MG/DL (ref 70–110)
HCT VFR BLD AUTO: 28 % (ref 37–48.5)
HGB BLD-MCNC: 8.8 G/DL (ref 12–16)
IMM GRANULOCYTES # BLD AUTO: 0.03 K/UL (ref 0–0.04)
IMM GRANULOCYTES NFR BLD AUTO: 0.3 % (ref 0–0.5)
INR PPP: 1 (ref 0.8–1.2)
LYMPHOCYTES # BLD AUTO: 1.8 K/UL (ref 1–4.8)
LYMPHOCYTES NFR BLD: 20.3 % (ref 18–48)
MAGNESIUM SERPL-MCNC: 1.9 MG/DL (ref 1.6–2.6)
MCH RBC QN AUTO: 30 PG (ref 27–31)
MCHC RBC AUTO-ENTMCNC: 31.4 G/DL (ref 32–36)
MCV RBC AUTO: 96 FL (ref 82–98)
MONOCYTES # BLD AUTO: 0.6 K/UL (ref 0.3–1)
MONOCYTES NFR BLD: 7.4 % (ref 4–15)
NEUTROPHILS # BLD AUTO: 5.9 K/UL (ref 1.8–7.7)
NEUTROPHILS NFR BLD: 68 % (ref 38–73)
NRBC BLD-RTO: 0 /100 WBC
PHOSPHATE SERPL-MCNC: 4.1 MG/DL (ref 2.7–4.5)
PLATELET # BLD AUTO: 555 K/UL (ref 150–450)
PMV BLD AUTO: 9.7 FL (ref 9.2–12.9)
POTASSIUM SERPL-SCNC: 3.6 MMOL/L (ref 3.5–5.1)
PROTHROMBIN TIME: 10.2 SEC (ref 9–12.5)
RBC # BLD AUTO: 2.93 M/UL (ref 4–5.4)
SODIUM SERPL-SCNC: 140 MMOL/L (ref 136–145)
WBC # BLD AUTO: 8.63 K/UL (ref 3.9–12.7)

## 2021-04-22 PROCEDURE — 85025 COMPLETE CBC W/AUTO DIFF WBC: CPT | Performed by: SURGERY

## 2021-04-22 PROCEDURE — 83735 ASSAY OF MAGNESIUM: CPT | Performed by: SURGERY

## 2021-04-22 PROCEDURE — 85610 PROTHROMBIN TIME: CPT | Performed by: SURGERY

## 2021-04-22 PROCEDURE — 36415 COLL VENOUS BLD VENIPUNCTURE: CPT | Performed by: SURGERY

## 2021-04-22 PROCEDURE — 85730 THROMBOPLASTIN TIME PARTIAL: CPT | Performed by: SURGERY

## 2021-04-22 PROCEDURE — 84100 ASSAY OF PHOSPHORUS: CPT | Performed by: SURGERY

## 2021-04-22 PROCEDURE — 80048 BASIC METABOLIC PNL TOTAL CA: CPT | Performed by: SURGERY

## 2021-04-23 ENCOUNTER — HOSPITAL ENCOUNTER (OUTPATIENT)
Dept: WOUND CARE | Facility: HOSPITAL | Age: 56
Discharge: HOME | End: 2021-04-23
Attending: PODIATRIST
Payer: MEDICARE

## 2021-04-23 ENCOUNTER — OFFICE VISIT (OUTPATIENT)
Dept: HEMATOLOGY/ONCOLOGY | Facility: CLINIC | Age: 56
End: 2021-04-23
Payer: MEDICARE

## 2021-04-23 VITALS
DIASTOLIC BLOOD PRESSURE: 90 MMHG | BODY MASS INDEX: 33.43 KG/M2 | HEART RATE: 82 BPM | HEIGHT: 65 IN | TEMPERATURE: 98 F | OXYGEN SATURATION: 100 % | WEIGHT: 200.63 LBS | SYSTOLIC BLOOD PRESSURE: 183 MMHG

## 2021-04-23 VITALS
DIASTOLIC BLOOD PRESSURE: 72 MMHG | HEART RATE: 81 BPM | RESPIRATION RATE: 20 BRPM | SYSTOLIC BLOOD PRESSURE: 164 MMHG | TEMPERATURE: 98 F

## 2021-04-23 DIAGNOSIS — E11.65 TYPE 2 DIABETES MELLITUS WITH HYPERGLYCEMIA, WITH LONG-TERM CURRENT USE OF INSULIN: ICD-10-CM

## 2021-04-23 DIAGNOSIS — L97.422 DIABETIC ULCER OF LEFT HEEL ASSOCIATED WITH TYPE 2 DIABETES MELLITUS, WITH FAT LAYER EXPOSED: Primary | ICD-10-CM

## 2021-04-23 DIAGNOSIS — I73.9 PERIPHERAL VASCULAR DISEASE: ICD-10-CM

## 2021-04-23 DIAGNOSIS — I73.9 PAD (PERIPHERAL ARTERY DISEASE): ICD-10-CM

## 2021-04-23 DIAGNOSIS — E11.621 DIABETIC ULCER OF LEFT HEEL ASSOCIATED WITH TYPE 2 DIABETES MELLITUS, WITH FAT LAYER EXPOSED: Primary | ICD-10-CM

## 2021-04-23 DIAGNOSIS — Z79.4 TYPE 2 DIABETES MELLITUS WITH HYPERGLYCEMIA, WITH LONG-TERM CURRENT USE OF INSULIN: ICD-10-CM

## 2021-04-23 DIAGNOSIS — L97.522 TOE ULCER, LEFT, WITH FAT LAYER EXPOSED: ICD-10-CM

## 2021-04-23 DIAGNOSIS — I73.9 PAD (PERIPHERAL ARTERY DISEASE): Primary | ICD-10-CM

## 2021-04-23 DIAGNOSIS — I99.8 ISCHEMIA OF LOWER EXTREMITY: ICD-10-CM

## 2021-04-23 PROCEDURE — 99205 OFFICE O/P NEW HI 60 MIN: CPT | Mod: S$PBB,,, | Performed by: STUDENT IN AN ORGANIZED HEALTH CARE EDUCATION/TRAINING PROGRAM

## 2021-04-23 PROCEDURE — 11042 DBRDMT SUBQ TIS 1ST 20SQCM/<: CPT | Performed by: PODIATRIST

## 2021-04-23 PROCEDURE — 99205 PR OFFICE/OUTPT VISIT, NEW, LEVL V, 60-74 MIN: ICD-10-PCS | Mod: S$PBB,,, | Performed by: STUDENT IN AN ORGANIZED HEALTH CARE EDUCATION/TRAINING PROGRAM

## 2021-04-23 PROCEDURE — 11042 DBRDMT SUBQ TIS 1ST 20SQCM/<: CPT | Mod: ,,, | Performed by: PODIATRIST

## 2021-04-23 PROCEDURE — 99499 NO LOS: ICD-10-PCS | Mod: ,,, | Performed by: PODIATRIST

## 2021-04-23 PROCEDURE — 27201912 HC WOUND CARE DEBRIDEMENT SUPPLIES: Performed by: PODIATRIST

## 2021-04-23 PROCEDURE — 87070 CULTURE OTHR SPECIMN AEROBIC: CPT | Performed by: PODIATRIST

## 2021-04-23 PROCEDURE — 99999 PR PBB SHADOW E&M-EST. PATIENT-LVL V: ICD-10-PCS | Mod: PBBFAC,,, | Performed by: STUDENT IN AN ORGANIZED HEALTH CARE EDUCATION/TRAINING PROGRAM

## 2021-04-23 PROCEDURE — 99499 UNLISTED E&M SERVICE: CPT | Mod: ,,, | Performed by: PODIATRIST

## 2021-04-23 PROCEDURE — 11042 WOUND DEBRIDEMENT: ICD-10-PCS | Mod: ,,, | Performed by: PODIATRIST

## 2021-04-23 PROCEDURE — 99215 OFFICE O/P EST HI 40 MIN: CPT | Mod: PBBFAC,25 | Performed by: STUDENT IN AN ORGANIZED HEALTH CARE EDUCATION/TRAINING PROGRAM

## 2021-04-23 PROCEDURE — 99999 PR PBB SHADOW E&M-EST. PATIENT-LVL V: CPT | Mod: PBBFAC,,, | Performed by: STUDENT IN AN ORGANIZED HEALTH CARE EDUCATION/TRAINING PROGRAM

## 2021-04-23 RX ORDER — DOXYCYCLINE 100 MG/1
100 CAPSULE ORAL 2 TIMES DAILY
Qty: 20 CAPSULE | Refills: 0 | Status: ON HOLD | OUTPATIENT
Start: 2021-04-23 | End: 2021-06-08 | Stop reason: HOSPADM

## 2021-04-26 ENCOUNTER — LAB VISIT (OUTPATIENT)
Dept: INTERNAL MEDICINE | Facility: CLINIC | Age: 56
DRG: 239 | End: 2021-04-26
Payer: MEDICARE

## 2021-04-26 DIAGNOSIS — Z01.818 PRE-OP TESTING: ICD-10-CM

## 2021-04-26 PROCEDURE — U0005 INFEC AGEN DETEC AMPLI PROBE: HCPCS | Performed by: SURGERY

## 2021-04-26 PROCEDURE — U0003 INFECTIOUS AGENT DETECTION BY NUCLEIC ACID (DNA OR RNA); SEVERE ACUTE RESPIRATORY SYNDROME CORONAVIRUS 2 (SARS-COV-2) (CORONAVIRUS DISEASE [COVID-19]), AMPLIFIED PROBE TECHNIQUE, MAKING USE OF HIGH THROUGHPUT TECHNOLOGIES AS DESCRIBED BY CMS-2020-01-R: HCPCS | Performed by: SURGERY

## 2021-04-27 ENCOUNTER — OFFICE VISIT (OUTPATIENT)
Dept: CARDIOLOGY | Facility: CLINIC | Age: 56
End: 2021-04-27
Payer: MEDICARE

## 2021-04-27 VITALS
HEIGHT: 65 IN | DIASTOLIC BLOOD PRESSURE: 74 MMHG | SYSTOLIC BLOOD PRESSURE: 136 MMHG | HEART RATE: 75 BPM | WEIGHT: 206.13 LBS | BODY MASS INDEX: 34.34 KG/M2 | OXYGEN SATURATION: 99 %

## 2021-04-27 DIAGNOSIS — I70.229 CRITICAL LOWER LIMB ISCHEMIA: Primary | ICD-10-CM

## 2021-04-27 DIAGNOSIS — I25.10 CORONARY ARTERY DISEASE, ANGINA PRESENCE UNSPECIFIED, UNSPECIFIED VESSEL OR LESION TYPE, UNSPECIFIED WHETHER NATIVE OR TRANSPLANTED HEART: Primary | ICD-10-CM

## 2021-04-27 LAB
BACTERIA SPEC AEROBE CULT: NO GROWTH
SARS-COV-2 RNA RESP QL NAA+PROBE: NOT DETECTED

## 2021-04-27 PROCEDURE — 99999 PR PBB SHADOW E&M-EST. PATIENT-LVL IV: CPT | Mod: PBBFAC,,, | Performed by: INTERNAL MEDICINE

## 2021-04-27 PROCEDURE — 99214 OFFICE O/P EST MOD 30 MIN: CPT | Mod: PBBFAC | Performed by: INTERNAL MEDICINE

## 2021-04-27 PROCEDURE — 99999 PR PBB SHADOW E&M-EST. PATIENT-LVL IV: ICD-10-PCS | Mod: PBBFAC,,, | Performed by: INTERNAL MEDICINE

## 2021-04-27 PROCEDURE — 99214 PR OFFICE/OUTPT VISIT, EST, LEVL IV, 30-39 MIN: ICD-10-PCS | Mod: S$PBB,,, | Performed by: INTERNAL MEDICINE

## 2021-04-27 PROCEDURE — 93005 ELECTROCARDIOGRAM TRACING: CPT | Mod: PBBFAC | Performed by: INTERNAL MEDICINE

## 2021-04-27 PROCEDURE — 99214 OFFICE O/P EST MOD 30 MIN: CPT | Mod: S$PBB,,, | Performed by: INTERNAL MEDICINE

## 2021-04-27 PROCEDURE — 93010 EKG 12-LEAD: ICD-10-PCS | Mod: S$PBB,,, | Performed by: INTERNAL MEDICINE

## 2021-04-27 PROCEDURE — 93010 ELECTROCARDIOGRAM REPORT: CPT | Mod: S$PBB,,, | Performed by: INTERNAL MEDICINE

## 2021-04-28 ENCOUNTER — TELEPHONE (OUTPATIENT)
Dept: VASCULAR SURGERY | Facility: CLINIC | Age: 56
End: 2021-04-28

## 2021-04-29 ENCOUNTER — ANESTHESIA (OUTPATIENT)
Dept: SURGERY | Facility: HOSPITAL | Age: 56
DRG: 239 | End: 2021-04-29
Payer: MEDICARE

## 2021-04-29 ENCOUNTER — ANESTHESIA EVENT (OUTPATIENT)
Dept: SURGERY | Facility: HOSPITAL | Age: 56
DRG: 239 | End: 2021-04-29
Payer: MEDICARE

## 2021-04-29 ENCOUNTER — HOSPITAL ENCOUNTER (INPATIENT)
Facility: HOSPITAL | Age: 56
LOS: 40 days | Discharge: NURSING, REHAB OR LONG-TERM CARE | DRG: 239 | End: 2021-06-08
Attending: SURGERY | Admitting: SURGERY
Payer: MEDICARE

## 2021-04-29 DIAGNOSIS — N18.30 ACUTE RENAL FAILURE WITH ACUTE RENAL CORTICAL NECROSIS SUPERIMPOSED ON STAGE 3 CHRONIC KIDNEY DISEASE, UNSPECIFIED WHETHER STAGE 3A OR 3B CKD: ICD-10-CM

## 2021-04-29 DIAGNOSIS — T82.868A THROMBOSIS OF FEMORO-POPLITEAL BYPASS GRAFT, INITIAL ENCOUNTER: ICD-10-CM

## 2021-04-29 DIAGNOSIS — R41.82 ALTERED MENTAL STATUS: ICD-10-CM

## 2021-04-29 DIAGNOSIS — E78.2 MIXED HYPERLIPIDEMIA: ICD-10-CM

## 2021-04-29 DIAGNOSIS — I70.229 CRITICAL LIMB ISCHEMIA WITH HISTORY OF REVASCULARIZATION OF SAME EXTREMITY: ICD-10-CM

## 2021-04-29 DIAGNOSIS — M86.272 SUBACUTE OSTEOMYELITIS OF LEFT FOOT: ICD-10-CM

## 2021-04-29 DIAGNOSIS — T82.868A: ICD-10-CM

## 2021-04-29 DIAGNOSIS — Z98.890 CRITICAL LIMB ISCHEMIA WITH HISTORY OF REVASCULARIZATION OF SAME EXTREMITY: ICD-10-CM

## 2021-04-29 DIAGNOSIS — I49.9 ARRHYTHMIA: ICD-10-CM

## 2021-04-29 DIAGNOSIS — R41.82 ALTERED MENTAL STATUS, UNSPECIFIED ALTERED MENTAL STATUS TYPE: ICD-10-CM

## 2021-04-29 DIAGNOSIS — G93.40 ENCEPHALOPATHY: ICD-10-CM

## 2021-04-29 DIAGNOSIS — Z74.09 IMPAIRED FUNCTIONAL MOBILITY AND ENDURANCE: ICD-10-CM

## 2021-04-29 DIAGNOSIS — N19 UREMIA: ICD-10-CM

## 2021-04-29 DIAGNOSIS — Z78.9 ON ENTERAL NUTRITION: ICD-10-CM

## 2021-04-29 DIAGNOSIS — N04.9 NEPHROTIC SYNDROME: ICD-10-CM

## 2021-04-29 DIAGNOSIS — L97.529 ULCER OF LEFT FOOT, UNSPECIFIED ULCER STAGE: ICD-10-CM

## 2021-04-29 DIAGNOSIS — Z79.4 TYPE 2 DIABETES MELLITUS WITH HYPERGLYCEMIA, WITH LONG-TERM CURRENT USE OF INSULIN: ICD-10-CM

## 2021-04-29 DIAGNOSIS — E11.65 TYPE 2 DIABETES MELLITUS WITH HYPERGLYCEMIA, WITH LONG-TERM CURRENT USE OF INSULIN: ICD-10-CM

## 2021-04-29 DIAGNOSIS — L97.524 ULCER OF LEFT FOOT WITH NECROSIS OF BONE: ICD-10-CM

## 2021-04-29 DIAGNOSIS — E66.01 CLASS 2 SEVERE OBESITY DUE TO EXCESS CALORIES WITH SERIOUS COMORBIDITY AND BODY MASS INDEX (BMI) OF 36.0 TO 36.9 IN ADULT: ICD-10-CM

## 2021-04-29 DIAGNOSIS — I73.9 PERIPHERAL VASCULAR DISEASE: Primary | ICD-10-CM

## 2021-04-29 DIAGNOSIS — I70.229 CRITICAL ISCHEMIA OF LOWER EXTREMITY: ICD-10-CM

## 2021-04-29 DIAGNOSIS — R94.31 QT PROLONGATION: ICD-10-CM

## 2021-04-29 DIAGNOSIS — E11.621 DIABETIC ULCER OF LEFT HEEL ASSOCIATED WITH TYPE 2 DIABETES MELLITUS, WITH BONE INVOLVEMENT WITHOUT EVIDENCE OF NECROSIS: ICD-10-CM

## 2021-04-29 DIAGNOSIS — L97.426 DIABETIC ULCER OF LEFT HEEL ASSOCIATED WITH TYPE 2 DIABETES MELLITUS, WITH BONE INVOLVEMENT WITHOUT EVIDENCE OF NECROSIS: ICD-10-CM

## 2021-04-29 DIAGNOSIS — T82.7XXA VASCULAR GRAFT INFECTION, INITIAL ENCOUNTER: ICD-10-CM

## 2021-04-29 DIAGNOSIS — I70.229 CRITICAL LOWER LIMB ISCHEMIA: ICD-10-CM

## 2021-04-29 DIAGNOSIS — T82.868D THROMBOSIS OF FEMORO-POPLITEAL BYPASS GRAFT, SUBSEQUENT ENCOUNTER: ICD-10-CM

## 2021-04-29 DIAGNOSIS — A41.52 SEPSIS DUE TO PSEUDOMONAS SPECIES, UNSPECIFIED WHETHER ACUTE ORGAN DYSFUNCTION PRESENT: ICD-10-CM

## 2021-04-29 DIAGNOSIS — N17.1 ACUTE RENAL FAILURE WITH ACUTE RENAL CORTICAL NECROSIS SUPERIMPOSED ON STAGE 3 CHRONIC KIDNEY DISEASE, UNSPECIFIED WHETHER STAGE 3A OR 3B CKD: ICD-10-CM

## 2021-04-29 DIAGNOSIS — M86.9 OSTEOMYELITIS OF LEFT FOOT, UNSPECIFIED TYPE: ICD-10-CM

## 2021-04-29 DIAGNOSIS — N17.9 AKI (ACUTE KIDNEY INJURY): ICD-10-CM

## 2021-04-29 DIAGNOSIS — F33.1 MDD (MAJOR DEPRESSIVE DISORDER), RECURRENT EPISODE, MODERATE: Chronic | ICD-10-CM

## 2021-04-29 LAB
ABO + RH BLD: NORMAL
BLD GP AB SCN CELLS X3 SERPL QL: NORMAL
POCT GLUCOSE: 162 MG/DL (ref 70–110)

## 2021-04-29 PROCEDURE — 63600175 PHARM REV CODE 636 W HCPCS: Performed by: NURSE ANESTHETIST, CERTIFIED REGISTERED

## 2021-04-29 PROCEDURE — C1729 CATH, DRAINAGE: HCPCS | Performed by: SURGERY

## 2021-04-29 PROCEDURE — 88304 TISSUE EXAM BY PATHOLOGIST: CPT | Mod: 26,,, | Performed by: STUDENT IN AN ORGANIZED HEALTH CARE EDUCATION/TRAINING PROGRAM

## 2021-04-29 PROCEDURE — 63600175 PHARM REV CODE 636 W HCPCS: Performed by: STUDENT IN AN ORGANIZED HEALTH CARE EDUCATION/TRAINING PROGRAM

## 2021-04-29 PROCEDURE — 63600175 PHARM REV CODE 636 W HCPCS: Performed by: SURGERY

## 2021-04-29 PROCEDURE — 27201037 HC PRESSURE MONITORING SET UP

## 2021-04-29 PROCEDURE — 27201423 OPTIME MED/SURG SUP & DEVICES STERILE SUPPLY: Performed by: SURGERY

## 2021-04-29 PROCEDURE — 86920 COMPATIBILITY TEST SPIN: CPT | Performed by: STUDENT IN AN ORGANIZED HEALTH CARE EDUCATION/TRAINING PROGRAM

## 2021-04-29 PROCEDURE — C1769 GUIDE WIRE: HCPCS | Performed by: SURGERY

## 2021-04-29 PROCEDURE — D9220A PRA ANESTHESIA: Mod: ANES,,, | Performed by: ANESTHESIOLOGY

## 2021-04-29 PROCEDURE — 35876 REMOVAL OF CLOT IN GRAFT: CPT | Mod: 78,RT,GC, | Performed by: SURGERY

## 2021-04-29 PROCEDURE — 86920 COMPATIBILITY TEST SPIN: CPT | Performed by: SURGERY

## 2021-04-29 PROCEDURE — 25000003 PHARM REV CODE 250: Performed by: STUDENT IN AN ORGANIZED HEALTH CARE EDUCATION/TRAINING PROGRAM

## 2021-04-29 PROCEDURE — P9021 RED BLOOD CELLS UNIT: HCPCS | Performed by: SURGERY

## 2021-04-29 PROCEDURE — 37000008 HC ANESTHESIA 1ST 15 MINUTES: Performed by: SURGERY

## 2021-04-29 PROCEDURE — 36000708 HC OR TIME LEV III 1ST 15 MIN: Performed by: SURGERY

## 2021-04-29 PROCEDURE — C1887 CATHETER, GUIDING: HCPCS | Performed by: SURGERY

## 2021-04-29 PROCEDURE — 82962 GLUCOSE BLOOD TEST: CPT | Performed by: SURGERY

## 2021-04-29 PROCEDURE — 35876 PR THROMBECTOMY A-V GRAFT, EXC HEMODIALYSIS, W REVISION: ICD-10-PCS | Mod: 78,RT,GC, | Performed by: SURGERY

## 2021-04-29 PROCEDURE — 12000002 HC ACUTE/MED SURGE SEMI-PRIVATE ROOM

## 2021-04-29 PROCEDURE — D9220A PRA ANESTHESIA: Mod: CRNA,,, | Performed by: STUDENT IN AN ORGANIZED HEALTH CARE EDUCATION/TRAINING PROGRAM

## 2021-04-29 PROCEDURE — 25500020 PHARM REV CODE 255: Performed by: SURGERY

## 2021-04-29 PROCEDURE — 86900 BLOOD TYPING SEROLOGIC ABO: CPT | Performed by: SURGERY

## 2021-04-29 PROCEDURE — C1894 INTRO/SHEATH, NON-LASER: HCPCS | Performed by: SURGERY

## 2021-04-29 PROCEDURE — 25000003 PHARM REV CODE 250: Performed by: NURSE ANESTHETIST, CERTIFIED REGISTERED

## 2021-04-29 PROCEDURE — 99223 PR INITIAL HOSPITAL CARE,LEVL III: ICD-10-PCS | Mod: ,,, | Performed by: SURGERY

## 2021-04-29 PROCEDURE — 99223 1ST HOSP IP/OBS HIGH 75: CPT | Mod: ,,, | Performed by: SURGERY

## 2021-04-29 PROCEDURE — 36000709 HC OR TIME LEV III EA ADD 15 MIN: Performed by: SURGERY

## 2021-04-29 PROCEDURE — C1757 CATH, THROMBECTOMY/EMBOLECT: HCPCS | Performed by: SURGERY

## 2021-04-29 PROCEDURE — 88304 PR  SURG PATH,LEVEL III: ICD-10-PCS | Mod: 26,,, | Performed by: STUDENT IN AN ORGANIZED HEALTH CARE EDUCATION/TRAINING PROGRAM

## 2021-04-29 PROCEDURE — 37000009 HC ANESTHESIA EA ADD 15 MINS: Performed by: SURGERY

## 2021-04-29 PROCEDURE — D9220A PRA ANESTHESIA: ICD-10-PCS | Mod: CRNA,,, | Performed by: STUDENT IN AN ORGANIZED HEALTH CARE EDUCATION/TRAINING PROGRAM

## 2021-04-29 PROCEDURE — 88304 TISSUE EXAM BY PATHOLOGIST: CPT | Performed by: STUDENT IN AN ORGANIZED HEALTH CARE EDUCATION/TRAINING PROGRAM

## 2021-04-29 PROCEDURE — D9220A PRA ANESTHESIA: ICD-10-PCS | Mod: ANES,,, | Performed by: ANESTHESIOLOGY

## 2021-04-29 RX ORDER — CEFAZOLIN SODIUM 1 G/3ML
2 INJECTION, POWDER, FOR SOLUTION INTRAMUSCULAR; INTRAVENOUS
Status: COMPLETED | OUTPATIENT
Start: 2021-04-29 | End: 2021-04-29

## 2021-04-29 RX ORDER — PROTAMINE SULFATE 10 MG/ML
INJECTION, SOLUTION INTRAVENOUS
Status: DISCONTINUED | OUTPATIENT
Start: 2021-04-29 | End: 2021-04-30

## 2021-04-29 RX ORDER — POTASSIUM CHLORIDE 7.45 MG/ML
INJECTION INTRAVENOUS
Status: DISCONTINUED | OUTPATIENT
Start: 2021-04-29 | End: 2021-04-30

## 2021-04-29 RX ORDER — EPHEDRINE SULFATE 50 MG/ML
INJECTION, SOLUTION INTRAVENOUS
Status: DISCONTINUED | OUTPATIENT
Start: 2021-04-29 | End: 2021-04-30

## 2021-04-29 RX ORDER — LIDOCAINE HCL/PF 100 MG/5ML
SYRINGE (ML) INTRAVENOUS
Status: DISCONTINUED | OUTPATIENT
Start: 2021-04-29 | End: 2021-04-30

## 2021-04-29 RX ORDER — MIDAZOLAM HYDROCHLORIDE 1 MG/ML
INJECTION INTRAMUSCULAR; INTRAVENOUS
Status: DISCONTINUED | OUTPATIENT
Start: 2021-04-29 | End: 2021-04-30

## 2021-04-29 RX ORDER — SODIUM CHLORIDE 0.9 % (FLUSH) 0.9 %
3 SYRINGE (ML) INJECTION
Status: DISCONTINUED | OUTPATIENT
Start: 2021-04-29 | End: 2021-05-28

## 2021-04-29 RX ORDER — PROPOFOL 10 MG/ML
VIAL (ML) INTRAVENOUS
Status: DISCONTINUED | OUTPATIENT
Start: 2021-04-29 | End: 2021-04-30

## 2021-04-29 RX ORDER — LABETALOL HYDROCHLORIDE 5 MG/ML
INJECTION, SOLUTION INTRAVENOUS
Status: DISCONTINUED | OUTPATIENT
Start: 2021-04-29 | End: 2021-04-30

## 2021-04-29 RX ORDER — PHENYLEPHRINE HCL IN 0.9% NACL 1 MG/10 ML
SYRINGE (ML) INTRAVENOUS
Status: DISCONTINUED | OUTPATIENT
Start: 2021-04-29 | End: 2021-04-30

## 2021-04-29 RX ORDER — HEPARIN SODIUM 1000 [USP'U]/ML
INJECTION, SOLUTION INTRAVENOUS; SUBCUTANEOUS
Status: DISCONTINUED | OUTPATIENT
Start: 2021-04-29 | End: 2021-04-30

## 2021-04-29 RX ORDER — ROCURONIUM BROMIDE 10 MG/ML
INJECTION, SOLUTION INTRAVENOUS
Status: DISCONTINUED | OUTPATIENT
Start: 2021-04-29 | End: 2021-04-30

## 2021-04-29 RX ORDER — VASOPRESSIN 20 [USP'U]/ML
INJECTION, SOLUTION INTRAMUSCULAR; SUBCUTANEOUS
Status: DISCONTINUED | OUTPATIENT
Start: 2021-04-29 | End: 2021-04-30

## 2021-04-29 RX ORDER — SODIUM CHLORIDE 9 MG/ML
INJECTION, SOLUTION INTRAVENOUS CONTINUOUS
Status: DISCONTINUED | OUTPATIENT
Start: 2021-04-29 | End: 2021-04-30

## 2021-04-29 RX ORDER — HEPARIN SODIUM 1000 [USP'U]/ML
INJECTION, SOLUTION INTRAVENOUS; SUBCUTANEOUS
Status: DISCONTINUED | OUTPATIENT
Start: 2021-04-29 | End: 2021-04-30 | Stop reason: HOSPADM

## 2021-04-29 RX ORDER — FENTANYL CITRATE 50 UG/ML
INJECTION, SOLUTION INTRAMUSCULAR; INTRAVENOUS
Status: DISCONTINUED | OUTPATIENT
Start: 2021-04-29 | End: 2021-04-30

## 2021-04-29 RX ORDER — NICARDIPINE HYDROCHLORIDE 0.2 MG/ML
INJECTION INTRAVENOUS CONTINUOUS PRN
Status: DISCONTINUED | OUTPATIENT
Start: 2021-04-29 | End: 2021-04-30

## 2021-04-29 RX ADMIN — ROCURONIUM BROMIDE 50 MG: 10 INJECTION, SOLUTION INTRAVENOUS at 01:04

## 2021-04-29 RX ADMIN — POTASSIUM CHLORIDE 20 MEQ: 10 INJECTION, SOLUTION INTRAVENOUS at 05:04

## 2021-04-29 RX ADMIN — FENTANYL CITRATE 25 MCG: 50 INJECTION, SOLUTION INTRAMUSCULAR; INTRAVENOUS at 04:04

## 2021-04-29 RX ADMIN — PROTAMINE SULFATE 20 MG: 10 INJECTION, SOLUTION INTRAVENOUS at 11:04

## 2021-04-29 RX ADMIN — Medication 100 MCG: at 09:04

## 2021-04-29 RX ADMIN — HEPARIN SODIUM 2000 UNITS: 1000 INJECTION, SOLUTION INTRAVENOUS; SUBCUTANEOUS at 08:04

## 2021-04-29 RX ADMIN — PROPOFOL 150 MG: 10 INJECTION, EMULSION INTRAVENOUS at 01:04

## 2021-04-29 RX ADMIN — FENTANYL CITRATE 50 MCG: 50 INJECTION, SOLUTION INTRAMUSCULAR; INTRAVENOUS at 10:04

## 2021-04-29 RX ADMIN — ROCURONIUM BROMIDE 10 MG: 10 INJECTION, SOLUTION INTRAVENOUS at 02:04

## 2021-04-29 RX ADMIN — ROCURONIUM BROMIDE 20 MG: 10 INJECTION, SOLUTION INTRAVENOUS at 07:04

## 2021-04-29 RX ADMIN — PROTAMINE SULFATE 5 MG: 10 INJECTION, SOLUTION INTRAVENOUS at 10:04

## 2021-04-29 RX ADMIN — VASOPRESSIN 1 UNITS: 20 INJECTION INTRAVENOUS at 09:04

## 2021-04-29 RX ADMIN — SODIUM CHLORIDE, SODIUM GLUCONATE, SODIUM ACETATE, POTASSIUM CHLORIDE, MAGNESIUM CHLORIDE, SODIUM PHOSPHATE, DIBASIC, AND POTASSIUM PHOSPHATE: .53; .5; .37; .037; .03; .012; .00082 INJECTION, SOLUTION INTRAVENOUS at 09:04

## 2021-04-29 RX ADMIN — PROTAMINE SULFATE 10 MG: 10 INJECTION, SOLUTION INTRAVENOUS at 11:04

## 2021-04-29 RX ADMIN — Medication 100 MCG: at 10:04

## 2021-04-29 RX ADMIN — HEPARIN SODIUM 9000 UNITS: 1000 INJECTION, SOLUTION INTRAVENOUS; SUBCUTANEOUS at 05:04

## 2021-04-29 RX ADMIN — MIDAZOLAM HYDROCHLORIDE 2 MG: 1 INJECTION, SOLUTION INTRAMUSCULAR; INTRAVENOUS at 01:04

## 2021-04-29 RX ADMIN — SODIUM CHLORIDE: 0.9 INJECTION, SOLUTION INTRAVENOUS at 01:04

## 2021-04-29 RX ADMIN — ROCURONIUM BROMIDE 20 MG: 10 INJECTION, SOLUTION INTRAVENOUS at 08:04

## 2021-04-29 RX ADMIN — ROCURONIUM BROMIDE 10 MG: 10 INJECTION, SOLUTION INTRAVENOUS at 06:04

## 2021-04-29 RX ADMIN — SODIUM CHLORIDE, SODIUM GLUCONATE, SODIUM ACETATE, POTASSIUM CHLORIDE, MAGNESIUM CHLORIDE, SODIUM PHOSPHATE, DIBASIC, AND POTASSIUM PHOSPHATE: .53; .5; .37; .037; .03; .012; .00082 INJECTION, SOLUTION INTRAVENOUS at 02:04

## 2021-04-29 RX ADMIN — HEPARIN SODIUM 2000 UNITS: 1000 INJECTION, SOLUTION INTRAVENOUS; SUBCUTANEOUS at 05:04

## 2021-04-29 RX ADMIN — FENTANYL CITRATE 25 MCG: 50 INJECTION, SOLUTION INTRAMUSCULAR; INTRAVENOUS at 11:04

## 2021-04-29 RX ADMIN — LIDOCAINE HYDROCHLORIDE 80 MG: 20 INJECTION, SOLUTION INTRAVENOUS at 01:04

## 2021-04-29 RX ADMIN — Medication 5 MG: at 03:04

## 2021-04-29 RX ADMIN — ROCURONIUM BROMIDE 10 MG: 10 INJECTION, SOLUTION INTRAVENOUS at 05:04

## 2021-04-29 RX ADMIN — Medication 5 MG: at 02:04

## 2021-04-29 RX ADMIN — HEPARIN SODIUM 2000 UNITS: 1000 INJECTION, SOLUTION INTRAVENOUS; SUBCUTANEOUS at 10:04

## 2021-04-29 RX ADMIN — CEFAZOLIN 2 G: 330 INJECTION, POWDER, FOR SOLUTION INTRAMUSCULAR; INTRAVENOUS at 10:04

## 2021-04-29 RX ADMIN — EPHEDRINE SULFATE 5 MG: 50 INJECTION INTRAVENOUS at 07:04

## 2021-04-29 RX ADMIN — SODIUM CHLORIDE, SODIUM GLUCONATE, SODIUM ACETATE, POTASSIUM CHLORIDE, MAGNESIUM CHLORIDE, SODIUM PHOSPHATE, DIBASIC, AND POTASSIUM PHOSPHATE: .53; .5; .37; .037; .03; .012; .00082 INJECTION, SOLUTION INTRAVENOUS at 05:04

## 2021-04-29 RX ADMIN — EPHEDRINE SULFATE 15 MG: 50 INJECTION INTRAVENOUS at 07:04

## 2021-04-29 RX ADMIN — ROCURONIUM BROMIDE 10 MG: 10 INJECTION, SOLUTION INTRAVENOUS at 07:04

## 2021-04-29 RX ADMIN — NICARDIPINE HYDROCHLORIDE 2.5 MG/HR: 0.2 INJECTION, SOLUTION INTRAVENOUS at 03:04

## 2021-04-29 RX ADMIN — ROCURONIUM BROMIDE 10 MG: 10 INJECTION, SOLUTION INTRAVENOUS at 04:04

## 2021-04-29 RX ADMIN — CEFAZOLIN 2 G: 330 INJECTION, POWDER, FOR SOLUTION INTRAMUSCULAR; INTRAVENOUS at 02:04

## 2021-04-29 RX ADMIN — SODIUM CHLORIDE 0.25 MCG/KG/MIN: 9 INJECTION, SOLUTION INTRAVENOUS at 09:04

## 2021-04-29 RX ADMIN — CEFAZOLIN 2 G: 330 INJECTION, POWDER, FOR SOLUTION INTRAMUSCULAR; INTRAVENOUS at 06:04

## 2021-04-29 RX ADMIN — HEPARIN SODIUM 2000 UNITS: 1000 INJECTION, SOLUTION INTRAVENOUS; SUBCUTANEOUS at 07:04

## 2021-04-29 RX ADMIN — HEPARIN SODIUM 2000 UNITS: 1000 INJECTION, SOLUTION INTRAVENOUS; SUBCUTANEOUS at 06:04

## 2021-04-29 RX ADMIN — EPHEDRINE SULFATE 20 MG: 50 INJECTION INTRAVENOUS at 07:04

## 2021-04-29 RX ADMIN — FENTANYL CITRATE 100 MCG: 50 INJECTION, SOLUTION INTRAMUSCULAR; INTRAVENOUS at 01:04

## 2021-04-30 PROBLEM — L97.529 ULCER OF LEFT FOOT: Status: ACTIVE | Noted: 2021-04-30

## 2021-04-30 LAB
ALBUMIN SERPL BCP-MCNC: 1.3 G/DL (ref 3.5–5.2)
ALLENS TEST: ABNORMAL
ALP SERPL-CCNC: 69 U/L (ref 55–135)
ALT SERPL W/O P-5'-P-CCNC: 6 U/L (ref 10–44)
ANION GAP SERPL CALC-SCNC: 10 MMOL/L (ref 8–16)
ANION GAP SERPL CALC-SCNC: 13 MMOL/L (ref 8–16)
ANION GAP SERPL CALC-SCNC: 8 MMOL/L (ref 8–16)
APTT BLDCRRT: 138.6 SEC (ref 21–32)
APTT BLDCRRT: 24.3 SEC (ref 21–32)
APTT BLDCRRT: 27.5 SEC (ref 21–32)
APTT BLDCRRT: 44.4 SEC (ref 21–32)
APTT BLDCRRT: 48.2 SEC (ref 21–32)
APTT BLDCRRT: >150 SEC (ref 21–32)
AST SERPL-CCNC: 10 U/L (ref 10–40)
BASOPHILS # BLD AUTO: 0.02 K/UL (ref 0–0.2)
BASOPHILS # BLD AUTO: 0.04 K/UL (ref 0–0.2)
BASOPHILS # BLD AUTO: 0.06 K/UL (ref 0–0.2)
BASOPHILS NFR BLD: 0.2 % (ref 0–1.9)
BASOPHILS NFR BLD: 0.3 % (ref 0–1.9)
BASOPHILS NFR BLD: 0.4 % (ref 0–1.9)
BILIRUB SERPL-MCNC: 0.2 MG/DL (ref 0.1–1)
BLD PROD TYP BPU: NORMAL
BLD PROD TYP BPU: NORMAL
BLOOD UNIT EXPIRATION DATE: NORMAL
BLOOD UNIT EXPIRATION DATE: NORMAL
BLOOD UNIT TYPE CODE: 5100
BLOOD UNIT TYPE CODE: 5100
BLOOD UNIT TYPE: NORMAL
BLOOD UNIT TYPE: NORMAL
BUN SERPL-MCNC: 18 MG/DL (ref 6–20)
BUN SERPL-MCNC: 19 MG/DL (ref 6–20)
BUN SERPL-MCNC: 21 MG/DL (ref 6–20)
CALCIUM SERPL-MCNC: 7.1 MG/DL (ref 8.7–10.5)
CALCIUM SERPL-MCNC: 7.5 MG/DL (ref 8.7–10.5)
CALCIUM SERPL-MCNC: 7.6 MG/DL (ref 8.7–10.5)
CALCIUM SERPL-MCNC: 7.6 MG/DL (ref 8.7–10.5)
CHLORIDE SERPL-SCNC: 113 MMOL/L (ref 95–110)
CHLORIDE SERPL-SCNC: 114 MMOL/L (ref 95–110)
CHLORIDE SERPL-SCNC: 115 MMOL/L (ref 95–110)
CO2 SERPL-SCNC: 16 MMOL/L (ref 23–29)
CO2 SERPL-SCNC: 18 MMOL/L (ref 23–29)
CO2 SERPL-SCNC: 19 MMOL/L (ref 23–29)
CODING SYSTEM: NORMAL
CODING SYSTEM: NORMAL
CREAT SERPL-MCNC: 1.2 MG/DL (ref 0.5–1.4)
CREAT SERPL-MCNC: 1.3 MG/DL (ref 0.5–1.4)
CREAT SERPL-MCNC: 1.5 MG/DL (ref 0.5–1.4)
DELSYS: ABNORMAL
DIFFERENTIAL METHOD: ABNORMAL
DISPENSE STATUS: NORMAL
DISPENSE STATUS: NORMAL
EOSINOPHIL # BLD AUTO: 0 K/UL (ref 0–0.5)
EOSINOPHIL NFR BLD: 0 % (ref 0–8)
EOSINOPHIL NFR BLD: 0.1 % (ref 0–8)
EOSINOPHIL NFR BLD: 0.1 % (ref 0–8)
ERYTHROCYTE [DISTWIDTH] IN BLOOD BY AUTOMATED COUNT: 15.5 % (ref 11.5–14.5)
ERYTHROCYTE [DISTWIDTH] IN BLOOD BY AUTOMATED COUNT: 15.8 % (ref 11.5–14.5)
ERYTHROCYTE [DISTWIDTH] IN BLOOD BY AUTOMATED COUNT: 16.2 % (ref 11.5–14.5)
ERYTHROCYTE [SEDIMENTATION RATE] IN BLOOD BY WESTERGREN METHOD: 18 MM/H
EST. GFR  (AFRICAN AMERICAN): 45.2 ML/MIN/1.73 M^2
EST. GFR  (AFRICAN AMERICAN): 53.7 ML/MIN/1.73 M^2
EST. GFR  (AFRICAN AMERICAN): 59.2 ML/MIN/1.73 M^2
EST. GFR  (NON AFRICAN AMERICAN): 39.2 ML/MIN/1.73 M^2
EST. GFR  (NON AFRICAN AMERICAN): 46.6 ML/MIN/1.73 M^2
EST. GFR  (NON AFRICAN AMERICAN): 51.4 ML/MIN/1.73 M^2
ESTIMATED AVG GLUCOSE: 128 MG/DL (ref 68–131)
FIBRINOGEN PPP-MCNC: 636 MG/DL (ref 182–366)
FIO2: 100
FIO2: 40
FIO2: 60
GLUCOSE SERPL-MCNC: 152 MG/DL (ref 70–110)
GLUCOSE SERPL-MCNC: 160 MG/DL (ref 70–110)
GLUCOSE SERPL-MCNC: 180 MG/DL (ref 70–110)
GLUCOSE SERPL-MCNC: 186 MG/DL (ref 70–110)
GLUCOSE SERPL-MCNC: 196 MG/DL (ref 70–110)
GLUCOSE SERPL-MCNC: 200 MG/DL (ref 70–110)
GLUCOSE SERPL-MCNC: 217 MG/DL (ref 70–110)
HBA1C MFR BLD: 6.1 % (ref 4–5.6)
HCO3 UR-SCNC: 17.6 MMOL/L (ref 24–28)
HCO3 UR-SCNC: 18.1 MMOL/L (ref 24–28)
HCO3 UR-SCNC: 19.9 MMOL/L (ref 24–28)
HCO3 UR-SCNC: 21.1 MMOL/L (ref 24–28)
HCO3 UR-SCNC: 21.2 MMOL/L (ref 24–28)
HCO3 UR-SCNC: 21.7 MMOL/L (ref 24–28)
HCO3 UR-SCNC: 22 MMOL/L (ref 24–28)
HCT VFR BLD AUTO: 22.6 % (ref 37–48.5)
HCT VFR BLD AUTO: 23.5 % (ref 37–48.5)
HCT VFR BLD AUTO: 24.3 % (ref 37–48.5)
HCT VFR BLD CALC: 20 %PCV (ref 36–54)
HCT VFR BLD CALC: 20 %PCV (ref 36–54)
HCT VFR BLD CALC: 21 %PCV (ref 36–54)
HCT VFR BLD CALC: 23 %PCV (ref 36–54)
HCT VFR BLD CALC: 23 %PCV (ref 36–54)
HGB BLD-MCNC: 7.5 G/DL (ref 12–16)
HGB BLD-MCNC: 7.9 G/DL (ref 12–16)
HGB BLD-MCNC: 7.9 G/DL (ref 12–16)
IMM GRANULOCYTES # BLD AUTO: 0.05 K/UL (ref 0–0.04)
IMM GRANULOCYTES # BLD AUTO: 0.05 K/UL (ref 0–0.04)
IMM GRANULOCYTES # BLD AUTO: 0.06 K/UL (ref 0–0.04)
IMM GRANULOCYTES NFR BLD AUTO: 0.3 % (ref 0–0.5)
IMM GRANULOCYTES NFR BLD AUTO: 0.4 % (ref 0–0.5)
IMM GRANULOCYTES NFR BLD AUTO: 0.5 % (ref 0–0.5)
INR PPP: 1 (ref 0.8–1.2)
INR PPP: 1.1 (ref 0.8–1.2)
LYMPHOCYTES # BLD AUTO: 1 K/UL (ref 1–4.8)
LYMPHOCYTES # BLD AUTO: 1.3 K/UL (ref 1–4.8)
LYMPHOCYTES # BLD AUTO: 1.3 K/UL (ref 1–4.8)
LYMPHOCYTES NFR BLD: 10.9 % (ref 18–48)
LYMPHOCYTES NFR BLD: 7.6 % (ref 18–48)
LYMPHOCYTES NFR BLD: 8.3 % (ref 18–48)
MAGNESIUM SERPL-MCNC: 1.8 MG/DL (ref 1.6–2.6)
MAGNESIUM SERPL-MCNC: 1.9 MG/DL (ref 1.6–2.6)
MCH RBC QN AUTO: 30 PG (ref 27–31)
MCH RBC QN AUTO: 30.5 PG (ref 27–31)
MCH RBC QN AUTO: 30.6 PG (ref 27–31)
MCHC RBC AUTO-ENTMCNC: 32.5 G/DL (ref 32–36)
MCHC RBC AUTO-ENTMCNC: 33.2 G/DL (ref 32–36)
MCHC RBC AUTO-ENTMCNC: 33.6 G/DL (ref 32–36)
MCV RBC AUTO: 91 FL (ref 82–98)
MCV RBC AUTO: 92 FL (ref 82–98)
MCV RBC AUTO: 92 FL (ref 82–98)
MODE: ABNORMAL
MONOCYTES # BLD AUTO: 1.2 K/UL (ref 0.3–1)
MONOCYTES # BLD AUTO: 1.3 K/UL (ref 0.3–1)
MONOCYTES # BLD AUTO: 1.5 K/UL (ref 0.3–1)
MONOCYTES NFR BLD: 10.9 % (ref 4–15)
MONOCYTES NFR BLD: 8.8 % (ref 4–15)
MONOCYTES NFR BLD: 9.5 % (ref 4–15)
NEUTROPHILS # BLD AUTO: 14.2 K/UL (ref 1.8–7.7)
NEUTROPHILS # BLD AUTO: 9.4 K/UL (ref 1.8–7.7)
NEUTROPHILS # BLD AUTO: 9.9 K/UL (ref 1.8–7.7)
NEUTROPHILS NFR BLD: 77.3 % (ref 38–73)
NEUTROPHILS NFR BLD: 81.6 % (ref 38–73)
NEUTROPHILS NFR BLD: 82.8 % (ref 38–73)
NRBC BLD-RTO: 0 /100 WBC
PCO2 BLDA: 26.2 MMHG (ref 35–45)
PCO2 BLDA: 31 MMHG (ref 35–45)
PCO2 BLDA: 37.8 MMHG (ref 35–45)
PCO2 BLDA: 38 MMHG (ref 35–45)
PCO2 BLDA: 39 MMHG (ref 35–45)
PCO2 BLDA: 39.4 MMHG (ref 35–45)
PCO2 BLDA: 41.9 MMHG (ref 35–45)
PEEP: 5
PH SMN: 7.31 [PH] (ref 7.35–7.45)
PH SMN: 7.33 [PH] (ref 7.35–7.45)
PH SMN: 7.34 [PH] (ref 7.35–7.45)
PH SMN: 7.35 [PH] (ref 7.35–7.45)
PH SMN: 7.36 [PH] (ref 7.35–7.45)
PH SMN: 7.37 [PH] (ref 7.35–7.45)
PH SMN: 7.45 [PH] (ref 7.35–7.45)
PHOSPHATE SERPL-MCNC: 3.1 MG/DL (ref 2.7–4.5)
PHOSPHATE SERPL-MCNC: 3.9 MG/DL (ref 2.7–4.5)
PLATELET # BLD AUTO: 210 K/UL (ref 150–450)
PLATELET # BLD AUTO: 259 K/UL (ref 150–450)
PLATELET # BLD AUTO: 347 K/UL (ref 150–450)
PMV BLD AUTO: 10 FL (ref 9.2–12.9)
PMV BLD AUTO: 10.3 FL (ref 9.2–12.9)
PMV BLD AUTO: 10.4 FL (ref 9.2–12.9)
PO2 BLDA: 101 MMHG (ref 80–100)
PO2 BLDA: 148 MMHG (ref 80–100)
PO2 BLDA: 164 MMHG (ref 80–100)
PO2 BLDA: 213 MMHG (ref 80–100)
PO2 BLDA: 338 MMHG (ref 80–100)
PO2 BLDA: 80 MMHG (ref 80–100)
PO2 BLDA: 82 MMHG (ref 80–100)
POC ACTIVATED CLOTTING TIME K: 136 SEC (ref 74–137)
POC ACTIVATED CLOTTING TIME K: 136 SEC (ref 74–137)
POC ACTIVATED CLOTTING TIME K: 208 SEC (ref 74–137)
POC ACTIVATED CLOTTING TIME K: 224 SEC (ref 74–137)
POC ACTIVATED CLOTTING TIME K: 224 SEC (ref 74–137)
POC ACTIVATED CLOTTING TIME K: 235 SEC (ref 74–137)
POC ACTIVATED CLOTTING TIME K: 241 SEC (ref 74–137)
POC ACTIVATED CLOTTING TIME K: 241 SEC (ref 74–137)
POC ACTIVATED CLOTTING TIME K: 246 SEC (ref 74–137)
POC ACTIVATED CLOTTING TIME K: 252 SEC (ref 74–137)
POC ACTIVATED CLOTTING TIME K: 252 SEC (ref 74–137)
POC ACTIVATED CLOTTING TIME K: 257 SEC (ref 74–137)
POC BE: -3 MMOL/L
POC BE: -4 MMOL/L
POC BE: -4 MMOL/L
POC BE: -5 MMOL/L
POC BE: -6 MMOL/L
POC BE: -6 MMOL/L
POC BE: -8 MMOL/L
POC IONIZED CALCIUM: 1.1 MMOL/L (ref 1.06–1.42)
POC IONIZED CALCIUM: 1.16 MMOL/L (ref 1.06–1.42)
POC IONIZED CALCIUM: 1.19 MMOL/L (ref 1.06–1.42)
POC IONIZED CALCIUM: 1.19 MMOL/L (ref 1.06–1.42)
POC IONIZED CALCIUM: 1.23 MMOL/L (ref 1.06–1.42)
POC SATURATED O2: 100 % (ref 95–100)
POC SATURATED O2: 100 % (ref 95–100)
POC SATURATED O2: 95 % (ref 95–100)
POC SATURATED O2: 97 % (ref 95–100)
POC SATURATED O2: 97 % (ref 95–100)
POC SATURATED O2: 99 % (ref 95–100)
POC SATURATED O2: 99 % (ref 95–100)
POC TCO2: 19 MMOL/L (ref 23–27)
POC TCO2: 19 MMOL/L (ref 23–27)
POC TCO2: 21 MMOL/L (ref 23–27)
POC TCO2: 22 MMOL/L (ref 23–27)
POC TCO2: 22 MMOL/L (ref 23–27)
POC TCO2: 23 MMOL/L (ref 23–27)
POC TCO2: 23 MMOL/L (ref 23–27)
POCT GLUCOSE: 173 MG/DL (ref 70–110)
POCT GLUCOSE: 178 MG/DL (ref 70–110)
POCT GLUCOSE: 184 MG/DL (ref 70–110)
POCT GLUCOSE: 185 MG/DL (ref 70–110)
POCT GLUCOSE: 242 MG/DL (ref 70–110)
POTASSIUM BLD-SCNC: 3.2 MMOL/L (ref 3.5–5.1)
POTASSIUM BLD-SCNC: 3.2 MMOL/L (ref 3.5–5.1)
POTASSIUM BLD-SCNC: 3.3 MMOL/L (ref 3.5–5.1)
POTASSIUM BLD-SCNC: 3.5 MMOL/L (ref 3.5–5.1)
POTASSIUM BLD-SCNC: 3.6 MMOL/L (ref 3.5–5.1)
POTASSIUM SERPL-SCNC: 3.4 MMOL/L (ref 3.5–5.1)
POTASSIUM SERPL-SCNC: 3.5 MMOL/L (ref 3.5–5.1)
POTASSIUM SERPL-SCNC: 3.7 MMOL/L (ref 3.5–5.1)
PROT SERPL-MCNC: 4.2 G/DL (ref 6–8.4)
PROTHROMBIN TIME: 11.2 SEC (ref 9–12.5)
PROTHROMBIN TIME: 11.7 SEC (ref 9–12.5)
RBC # BLD AUTO: 2.46 M/UL (ref 4–5.4)
RBC # BLD AUTO: 2.58 M/UL (ref 4–5.4)
RBC # BLD AUTO: 2.63 M/UL (ref 4–5.4)
SAMPLE: ABNORMAL
SAMPLE: NORMAL
SAMPLE: NORMAL
SITE: ABNORMAL
SODIUM BLD-SCNC: 141 MMOL/L (ref 136–145)
SODIUM BLD-SCNC: 142 MMOL/L (ref 136–145)
SODIUM BLD-SCNC: 142 MMOL/L (ref 136–145)
SODIUM BLD-SCNC: 143 MMOL/L (ref 136–145)
SODIUM BLD-SCNC: 143 MMOL/L (ref 136–145)
SODIUM SERPL-SCNC: 140 MMOL/L (ref 136–145)
SODIUM SERPL-SCNC: 140 MMOL/L (ref 136–145)
SODIUM SERPL-SCNC: 146 MMOL/L (ref 136–145)
SP02: 100
TRANS ERYTHROCYTES VOL PATIENT: NORMAL ML
TRANS ERYTHROCYTES VOL PATIENT: NORMAL ML
VT: 400
WBC # BLD AUTO: 12.08 K/UL (ref 3.9–12.7)
WBC # BLD AUTO: 12.16 K/UL (ref 3.9–12.7)
WBC # BLD AUTO: 17.12 K/UL (ref 3.9–12.7)

## 2021-04-30 PROCEDURE — 83735 ASSAY OF MAGNESIUM: CPT | Mod: 91 | Performed by: STUDENT IN AN ORGANIZED HEALTH CARE EDUCATION/TRAINING PROGRAM

## 2021-04-30 PROCEDURE — 85730 THROMBOPLASTIN TIME PARTIAL: CPT | Mod: 91 | Performed by: STUDENT IN AN ORGANIZED HEALTH CARE EDUCATION/TRAINING PROGRAM

## 2021-04-30 PROCEDURE — 63600175 PHARM REV CODE 636 W HCPCS: Performed by: STUDENT IN AN ORGANIZED HEALTH CARE EDUCATION/TRAINING PROGRAM

## 2021-04-30 PROCEDURE — 25000003 PHARM REV CODE 250: Performed by: NURSE ANESTHETIST, CERTIFIED REGISTERED

## 2021-04-30 PROCEDURE — P9021 RED BLOOD CELLS UNIT: HCPCS | Performed by: SURGERY

## 2021-04-30 PROCEDURE — 83036 HEMOGLOBIN GLYCOSYLATED A1C: CPT | Performed by: STUDENT IN AN ORGANIZED HEALTH CARE EDUCATION/TRAINING PROGRAM

## 2021-04-30 PROCEDURE — 99223 PR INITIAL HOSPITAL CARE,LEVL III: ICD-10-PCS | Mod: GC,,, | Performed by: PODIATRIST

## 2021-04-30 PROCEDURE — 63600175 PHARM REV CODE 636 W HCPCS

## 2021-04-30 PROCEDURE — 25000003 PHARM REV CODE 250: Performed by: STUDENT IN AN ORGANIZED HEALTH CARE EDUCATION/TRAINING PROGRAM

## 2021-04-30 PROCEDURE — 85610 PROTHROMBIN TIME: CPT | Performed by: SURGERY

## 2021-04-30 PROCEDURE — 25000003 PHARM REV CODE 250

## 2021-04-30 PROCEDURE — 84100 ASSAY OF PHOSPHORUS: CPT | Performed by: STUDENT IN AN ORGANIZED HEALTH CARE EDUCATION/TRAINING PROGRAM

## 2021-04-30 PROCEDURE — 94003 VENT MGMT INPAT SUBQ DAY: CPT

## 2021-04-30 PROCEDURE — 99024 POSTOP FOLLOW-UP VISIT: CPT | Mod: POP,,, | Performed by: SURGERY

## 2021-04-30 PROCEDURE — 85730 THROMBOPLASTIN TIME PARTIAL: CPT | Performed by: SURGERY

## 2021-04-30 PROCEDURE — 85384 FIBRINOGEN ACTIVITY: CPT | Performed by: SURGERY

## 2021-04-30 PROCEDURE — 27000221 HC OXYGEN, UP TO 24 HOURS

## 2021-04-30 PROCEDURE — 94150 VITAL CAPACITY TEST: CPT

## 2021-04-30 PROCEDURE — 76937 US GUIDE VASCULAR ACCESS: CPT

## 2021-04-30 PROCEDURE — 99232 SBSQ HOSP IP/OBS MODERATE 35: CPT | Mod: ,,, | Performed by: NURSE PRACTITIONER

## 2021-04-30 PROCEDURE — 85025 COMPLETE CBC W/AUTO DIFF WBC: CPT | Mod: 91 | Performed by: STUDENT IN AN ORGANIZED HEALTH CARE EDUCATION/TRAINING PROGRAM

## 2021-04-30 PROCEDURE — 85025 COMPLETE CBC W/AUTO DIFF WBC: CPT | Performed by: STUDENT IN AN ORGANIZED HEALTH CARE EDUCATION/TRAINING PROGRAM

## 2021-04-30 PROCEDURE — 27200966 HC CLOSED SUCTION SYSTEM

## 2021-04-30 PROCEDURE — 80048 BASIC METABOLIC PNL TOTAL CA: CPT | Mod: 91 | Performed by: SURGERY

## 2021-04-30 PROCEDURE — 82803 BLOOD GASES ANY COMBINATION: CPT

## 2021-04-30 PROCEDURE — 94002 VENT MGMT INPAT INIT DAY: CPT

## 2021-04-30 PROCEDURE — 99223 1ST HOSP IP/OBS HIGH 75: CPT | Mod: GC,,, | Performed by: PODIATRIST

## 2021-04-30 PROCEDURE — 80053 COMPREHEN METABOLIC PANEL: CPT | Performed by: STUDENT IN AN ORGANIZED HEALTH CARE EDUCATION/TRAINING PROGRAM

## 2021-04-30 PROCEDURE — 99900035 HC TECH TIME PER 15 MIN (STAT)

## 2021-04-30 PROCEDURE — 63600175 PHARM REV CODE 636 W HCPCS: Performed by: SURGERY

## 2021-04-30 PROCEDURE — C1751 CATH, INF, PER/CENT/MIDLINE: HCPCS

## 2021-04-30 PROCEDURE — 36410 VNPNXR 3YR/> PHY/QHP DX/THER: CPT

## 2021-04-30 PROCEDURE — 37799 UNLISTED PX VASCULAR SURGERY: CPT

## 2021-04-30 PROCEDURE — 80048 BASIC METABOLIC PNL TOTAL CA: CPT | Performed by: STUDENT IN AN ORGANIZED HEALTH CARE EDUCATION/TRAINING PROGRAM

## 2021-04-30 PROCEDURE — 99024 PR POST-OP FOLLOW-UP VISIT: ICD-10-PCS | Mod: POP,,, | Performed by: SURGERY

## 2021-04-30 PROCEDURE — 99900017 HC EXTUBATION W/PARAMETERS (STAT)

## 2021-04-30 PROCEDURE — 99900026 HC AIRWAY MAINTENANCE (STAT)

## 2021-04-30 PROCEDURE — 85610 PROTHROMBIN TIME: CPT | Mod: 91 | Performed by: STUDENT IN AN ORGANIZED HEALTH CARE EDUCATION/TRAINING PROGRAM

## 2021-04-30 PROCEDURE — 83735 ASSAY OF MAGNESIUM: CPT | Performed by: STUDENT IN AN ORGANIZED HEALTH CARE EDUCATION/TRAINING PROGRAM

## 2021-04-30 PROCEDURE — 94761 N-INVAS EAR/PLS OXIMETRY MLT: CPT

## 2021-04-30 PROCEDURE — 99232 PR SUBSEQUENT HOSPITAL CARE,LEVL II: ICD-10-PCS | Mod: ,,, | Performed by: NURSE PRACTITIONER

## 2021-04-30 PROCEDURE — 20000000 HC ICU ROOM

## 2021-04-30 PROCEDURE — 25000003 PHARM REV CODE 250: Performed by: NURSE PRACTITIONER

## 2021-04-30 PROCEDURE — 84100 ASSAY OF PHOSPHORUS: CPT | Mod: 91 | Performed by: STUDENT IN AN ORGANIZED HEALTH CARE EDUCATION/TRAINING PROGRAM

## 2021-04-30 PROCEDURE — C9399 UNCLASSIFIED DRUGS OR BIOLOG: HCPCS | Performed by: NURSE PRACTITIONER

## 2021-04-30 RX ORDER — GLUCAGON 1 MG
1 KIT INJECTION
Status: DISCONTINUED | OUTPATIENT
Start: 2021-04-30 | End: 2021-04-30

## 2021-04-30 RX ORDER — ALLOPURINOL 100 MG/1
100 TABLET ORAL DAILY
Status: DISCONTINUED | OUTPATIENT
Start: 2021-04-30 | End: 2021-05-18

## 2021-04-30 RX ORDER — IBUPROFEN 200 MG
24 TABLET ORAL
Status: DISCONTINUED | OUTPATIENT
Start: 2021-04-30 | End: 2021-05-18

## 2021-04-30 RX ORDER — IBUPROFEN 200 MG
16 TABLET ORAL
Status: DISCONTINUED | OUTPATIENT
Start: 2021-04-30 | End: 2021-05-18

## 2021-04-30 RX ORDER — ONDANSETRON 4 MG/1
4 TABLET, ORALLY DISINTEGRATING ORAL EVERY 12 HOURS PRN
Status: DISCONTINUED | OUTPATIENT
Start: 2021-04-30 | End: 2021-05-18

## 2021-04-30 RX ORDER — NICARDIPINE HYDROCHLORIDE 0.2 MG/ML
0-15 INJECTION INTRAVENOUS CONTINUOUS
Status: DISCONTINUED | OUTPATIENT
Start: 2021-04-30 | End: 2021-05-01 | Stop reason: HOSPADM

## 2021-04-30 RX ORDER — INSULIN ASPART 100 [IU]/ML
0-5 INJECTION, SOLUTION INTRAVENOUS; SUBCUTANEOUS
Status: DISCONTINUED | OUTPATIENT
Start: 2021-04-30 | End: 2021-05-18

## 2021-04-30 RX ORDER — HEPARIN SODIUM,PORCINE/D5W 25000/250
0-40 INTRAVENOUS SOLUTION INTRAVENOUS CONTINUOUS
Status: DISCONTINUED | OUTPATIENT
Start: 2021-04-30 | End: 2021-05-02

## 2021-04-30 RX ORDER — OXYCODONE AND ACETAMINOPHEN 10; 325 MG/1; MG/1
1 TABLET ORAL EVERY 6 HOURS PRN
Status: DISCONTINUED | OUTPATIENT
Start: 2021-04-30 | End: 2021-05-02

## 2021-04-30 RX ORDER — ASPIRIN 300 MG/1
300 SUPPOSITORY RECTAL DAILY
Status: DISCONTINUED | OUTPATIENT
Start: 2021-04-30 | End: 2021-05-01

## 2021-04-30 RX ORDER — POTASSIUM CHLORIDE 7.45 MG/ML
60 INJECTION INTRAVENOUS
Status: DISCONTINUED | OUTPATIENT
Start: 2021-04-30 | End: 2021-05-01

## 2021-04-30 RX ORDER — LANOLIN ALCOHOL/MO/W.PET/CERES
800 CREAM (GRAM) TOPICAL
Status: DISCONTINUED | OUTPATIENT
Start: 2021-04-30 | End: 2021-05-01

## 2021-04-30 RX ORDER — SODIUM,POTASSIUM PHOSPHATES 280-250MG
2 POWDER IN PACKET (EA) ORAL
Status: DISCONTINUED | OUTPATIENT
Start: 2021-04-30 | End: 2021-05-02

## 2021-04-30 RX ORDER — SODIUM CHLORIDE 9 MG/ML
INJECTION, SOLUTION INTRAVENOUS CONTINUOUS
Status: DISCONTINUED | OUTPATIENT
Start: 2021-04-30 | End: 2021-05-20

## 2021-04-30 RX ORDER — HYDROMORPHONE HCL IN 0.9% NACL 6 MG/30 ML
PATIENT CONTROLLED ANALGESIA SYRINGE INTRAVENOUS CONTINUOUS
Status: DISCONTINUED | OUTPATIENT
Start: 2021-04-30 | End: 2021-05-01

## 2021-04-30 RX ORDER — FENTANYL CITRATE-0.9 % NACL/PF 10 MCG/ML
0-200 PLASTIC BAG, INJECTION (ML) INTRAVENOUS CONTINUOUS
Status: DISCONTINUED | OUTPATIENT
Start: 2021-04-30 | End: 2021-05-01

## 2021-04-30 RX ORDER — SODIUM CHLORIDE 9 MG/ML
INJECTION, SOLUTION INTRAVENOUS CONTINUOUS
Status: DISCONTINUED | OUTPATIENT
Start: 2021-04-30 | End: 2021-05-01

## 2021-04-30 RX ORDER — MAGNESIUM SULFATE HEPTAHYDRATE 40 MG/ML
2 INJECTION, SOLUTION INTRAVENOUS
Status: DISCONTINUED | OUTPATIENT
Start: 2021-04-30 | End: 2021-05-01

## 2021-04-30 RX ORDER — NICARDIPINE HYDROCHLORIDE 0.2 MG/ML
INJECTION INTRAVENOUS
Status: COMPLETED
Start: 2021-04-30 | End: 2021-04-30

## 2021-04-30 RX ORDER — HYDROMORPHONE HYDROCHLORIDE 1 MG/ML
0.5 INJECTION, SOLUTION INTRAMUSCULAR; INTRAVENOUS; SUBCUTANEOUS ONCE
Status: COMPLETED | OUTPATIENT
Start: 2021-04-30 | End: 2021-04-30

## 2021-04-30 RX ORDER — SODIUM CHLORIDE 0.9 % (FLUSH) 0.9 %
10 SYRINGE (ML) INJECTION
Status: DISCONTINUED | OUTPATIENT
Start: 2021-04-30 | End: 2021-05-28

## 2021-04-30 RX ORDER — MAGNESIUM SULFATE HEPTAHYDRATE 40 MG/ML
4 INJECTION, SOLUTION INTRAVENOUS
Status: DISCONTINUED | OUTPATIENT
Start: 2021-04-30 | End: 2021-05-01

## 2021-04-30 RX ORDER — HYDROMORPHONE HYDROCHLORIDE 1 MG/ML
0.5 INJECTION, SOLUTION INTRAMUSCULAR; INTRAVENOUS; SUBCUTANEOUS
Status: DISCONTINUED | OUTPATIENT
Start: 2021-04-30 | End: 2021-05-01

## 2021-04-30 RX ORDER — INSULIN ASPART 100 [IU]/ML
3 INJECTION, SOLUTION INTRAVENOUS; SUBCUTANEOUS
Status: DISCONTINUED | OUTPATIENT
Start: 2021-04-30 | End: 2021-05-02

## 2021-04-30 RX ORDER — CARVEDILOL 3.12 MG/1
6.25 TABLET ORAL NIGHTLY
Status: DISCONTINUED | OUTPATIENT
Start: 2021-04-30 | End: 2021-05-18

## 2021-04-30 RX ORDER — INDOMETHACIN 25 MG/1
50 CAPSULE ORAL ONCE
Status: COMPLETED | OUTPATIENT
Start: 2021-04-30 | End: 2021-04-30

## 2021-04-30 RX ORDER — ATORVASTATIN CALCIUM 20 MG/1
80 TABLET, FILM COATED ORAL NIGHTLY
Status: DISCONTINUED | OUTPATIENT
Start: 2021-04-30 | End: 2021-05-18

## 2021-04-30 RX ORDER — DEXMEDETOMIDINE HYDROCHLORIDE 4 UG/ML
0-1.4 INJECTION, SOLUTION INTRAVENOUS CONTINUOUS
Status: DISCONTINUED | OUTPATIENT
Start: 2021-04-30 | End: 2021-05-01

## 2021-04-30 RX ORDER — POTASSIUM CHLORIDE 20 MEQ/1
40 TABLET, EXTENDED RELEASE ORAL ONCE
Status: DISCONTINUED | OUTPATIENT
Start: 2021-04-30 | End: 2021-04-30

## 2021-04-30 RX ORDER — INSULIN ASPART 100 [IU]/ML
1-10 INJECTION, SOLUTION INTRAVENOUS; SUBCUTANEOUS EVERY 6 HOURS PRN
Status: DISCONTINUED | OUTPATIENT
Start: 2021-04-30 | End: 2021-04-30

## 2021-04-30 RX ORDER — POTASSIUM CHLORIDE 7.45 MG/ML
40 INJECTION INTRAVENOUS
Status: DISCONTINUED | OUTPATIENT
Start: 2021-04-30 | End: 2021-05-01

## 2021-04-30 RX ORDER — MUPIROCIN 20 MG/G
1 OINTMENT TOPICAL 2 TIMES DAILY
Status: DISPENSED | OUTPATIENT
Start: 2021-04-30 | End: 2021-05-05

## 2021-04-30 RX ORDER — POTASSIUM CHLORIDE 7.45 MG/ML
80 INJECTION INTRAVENOUS
Status: DISCONTINUED | OUTPATIENT
Start: 2021-04-30 | End: 2021-05-01

## 2021-04-30 RX ORDER — HYDROCODONE BITARTRATE AND ACETAMINOPHEN 5; 325 MG/1; MG/1
1 TABLET ORAL EVERY 4 HOURS PRN
Status: DISCONTINUED | OUTPATIENT
Start: 2021-04-30 | End: 2021-05-02

## 2021-04-30 RX ORDER — HEPARIN SODIUM 10000 [USP'U]/100ML
500 INJECTION, SOLUTION INTRAVENOUS CONTINUOUS
Status: DISCONTINUED | OUTPATIENT
Start: 2021-04-30 | End: 2021-04-30

## 2021-04-30 RX ORDER — PROPOFOL 10 MG/ML
0-50 INJECTION, EMULSION INTRAVENOUS CONTINUOUS
Status: DISCONTINUED | OUTPATIENT
Start: 2021-04-30 | End: 2021-05-01

## 2021-04-30 RX ORDER — NALOXONE HCL 0.4 MG/ML
0.02 VIAL (ML) INJECTION
Status: DISCONTINUED | OUTPATIENT
Start: 2021-04-30 | End: 2021-06-08 | Stop reason: HOSPADM

## 2021-04-30 RX ORDER — GLUCAGON 1 MG
1 KIT INJECTION
Status: DISCONTINUED | OUTPATIENT
Start: 2021-04-30 | End: 2021-05-18

## 2021-04-30 RX ORDER — PROPOFOL 10 MG/ML
INJECTION, EMULSION INTRAVENOUS
Status: COMPLETED
Start: 2021-04-30 | End: 2021-04-30

## 2021-04-30 RX ORDER — HYDROCODONE BITARTRATE AND ACETAMINOPHEN 500; 5 MG/1; MG/1
TABLET ORAL
Status: DISCONTINUED | OUTPATIENT
Start: 2021-04-30 | End: 2021-05-17

## 2021-04-30 RX ADMIN — PROPOFOL 10 MCG/KG/MIN: 10 INJECTION, EMULSION INTRAVENOUS at 01:04

## 2021-04-30 RX ADMIN — DEXMEDETOMIDINE HYDROCHLORIDE 0.8 MCG/KG/HR: 4 INJECTION, SOLUTION INTRAVENOUS at 12:04

## 2021-04-30 RX ADMIN — POTASSIUM BICARBONATE 50 MEQ: 978 TABLET, EFFERVESCENT ORAL at 04:04

## 2021-04-30 RX ADMIN — ONDANSETRON 4 MG: 4 TABLET, ORALLY DISINTEGRATING ORAL at 10:04

## 2021-04-30 RX ADMIN — HEPARIN SODIUM AND DEXTROSE 14 UNITS/KG/HR: 10000; 5 INJECTION INTRAVENOUS at 04:04

## 2021-04-30 RX ADMIN — Medication: at 12:04

## 2021-04-30 RX ADMIN — PROPOFOL 50 MCG/KG/MIN: 10 INJECTION, EMULSION INTRAVENOUS at 04:04

## 2021-04-30 RX ADMIN — Medication 25 MCG/HR: at 04:04

## 2021-04-30 RX ADMIN — SODIUM CHLORIDE 1000 ML: 0.9 INJECTION, SOLUTION INTRAVENOUS at 02:04

## 2021-04-30 RX ADMIN — ALLOPURINOL 100 MG: 100 TABLET ORAL at 09:04

## 2021-04-30 RX ADMIN — HYDROMORPHONE HYDROCHLORIDE 0.5 MG: 1 INJECTION, SOLUTION INTRAMUSCULAR; INTRAVENOUS; SUBCUTANEOUS at 02:04

## 2021-04-30 RX ADMIN — HEPARIN SODIUM AND DEXTROSE 14 UNITS/KG/HR: 10000; 5 INJECTION INTRAVENOUS at 10:04

## 2021-04-30 RX ADMIN — INSULIN ASPART 2 UNITS: 100 INJECTION, SOLUTION INTRAVENOUS; SUBCUTANEOUS at 06:04

## 2021-04-30 RX ADMIN — SODIUM CHLORIDE, SODIUM LACTATE, POTASSIUM CHLORIDE, AND CALCIUM CHLORIDE 1000 ML: .6; .31; .03; .02 INJECTION, SOLUTION INTRAVENOUS at 04:04

## 2021-04-30 RX ADMIN — SODIUM CHLORIDE: 0.9 INJECTION, SOLUTION INTRAVENOUS at 12:04

## 2021-04-30 RX ADMIN — MAGNESIUM SULFATE 2 G: 2 INJECTION INTRAVENOUS at 03:04

## 2021-04-30 RX ADMIN — SODIUM CHLORIDE: 0.9 INJECTION, SOLUTION INTRAVENOUS at 02:04

## 2021-04-30 RX ADMIN — ATORVASTATIN CALCIUM 80 MG: 20 TABLET, FILM COATED ORAL at 09:04

## 2021-04-30 RX ADMIN — MUPIROCIN 1 G: 20 OINTMENT TOPICAL at 08:04

## 2021-04-30 RX ADMIN — MUPIROCIN 1 G: 20 OINTMENT TOPICAL at 09:04

## 2021-04-30 RX ADMIN — ASPIRIN 300 MG: 300 SUPPOSITORY RECTAL at 09:04

## 2021-04-30 RX ADMIN — NICARDIPINE HYDROCHLORIDE 2.5 MG/HR: 0.2 INJECTION, SOLUTION INTRAVENOUS at 01:04

## 2021-04-30 RX ADMIN — Medication: at 08:04

## 2021-04-30 RX ADMIN — SODIUM BICARBONATE 50 MEQ: 84 INJECTION, SOLUTION INTRAVENOUS at 04:04

## 2021-04-30 RX ADMIN — DEXMEDETOMIDINE HYDROCHLORIDE 0.2 MCG/KG/HR: 4 INJECTION, SOLUTION INTRAVENOUS at 02:04

## 2021-04-30 RX ADMIN — HEPARIN SODIUM AND DEXTROSE 18 UNITS/KG/HR: 10000; 5 INJECTION INTRAVENOUS at 06:04

## 2021-04-30 RX ADMIN — Medication 200 MCG: at 12:04

## 2021-04-30 RX ADMIN — CALCIUM GLUCONATE 1 G: 98 INJECTION, SOLUTION INTRAVENOUS at 10:04

## 2021-04-30 RX ADMIN — POTASSIUM BICARBONATE 35 MEQ: 978 TABLET, EFFERVESCENT ORAL at 09:04

## 2021-04-30 RX ADMIN — SODIUM CHLORIDE, SODIUM LACTATE, POTASSIUM CHLORIDE, AND CALCIUM CHLORIDE 1000 ML: .6; .31; .03; .02 INJECTION, SOLUTION INTRAVENOUS at 11:04

## 2021-04-30 RX ADMIN — INSULIN ASPART 4 UNITS: 100 INJECTION, SOLUTION INTRAVENOUS; SUBCUTANEOUS at 12:04

## 2021-04-30 RX ADMIN — SODIUM CHLORIDE, SODIUM LACTATE, POTASSIUM CHLORIDE, AND CALCIUM CHLORIDE 500 ML: .6; .31; .03; .02 INJECTION, SOLUTION INTRAVENOUS at 09:04

## 2021-04-30 RX ADMIN — HEPARIN SODIUM AND DEXTROSE 500 UNITS/HR: 10000; 5 INJECTION INTRAVENOUS at 04:04

## 2021-04-30 RX ADMIN — VASOPRESSIN 2 UNITS: 20 INJECTION INTRAVENOUS at 12:04

## 2021-04-30 RX ADMIN — NICARDIPINE HYDROCHLORIDE 2.5 MG/HR: 0.2 INJECTION INTRAVENOUS at 01:04

## 2021-04-30 RX ADMIN — ATORVASTATIN CALCIUM 80 MG: 20 TABLET, FILM COATED ORAL at 02:04

## 2021-04-30 RX ADMIN — INSULIN DETEMIR 8 UNITS: 100 INJECTION, SOLUTION SUBCUTANEOUS at 09:04

## 2021-04-30 NOTE — PLAN OF CARE
Problem: Fall Injury Risk  Goal: Absence of Fall and Fall-Related Injury  Outcome: Ongoing, Progressing     Problem: Adult Inpatient Plan of Care  Goal: Plan of Care Review  Outcome: Ongoing, Progressing  Goal: Patient-Specific Goal (Individualization)  Outcome: Ongoing, Progressing  Goal: Absence of Hospital-Acquired Illness or Injury  Outcome: Ongoing, Progressing  Goal: Optimal Comfort and Wellbeing  Outcome: Ongoing, Progressing  Goal: Readiness for Transition of Care  Outcome: Ongoing, Progressing  Goal: Rounds/Family Conference  Outcome: Ongoing, Progressing      No

## 2021-05-01 PROBLEM — Z98.890 CRITICAL LIMB ISCHEMIA WITH HISTORY OF REVASCULARIZATION OF SAME EXTREMITY: Status: ACTIVE | Noted: 2021-05-01

## 2021-05-01 PROBLEM — I70.229 CRITICAL ISCHEMIA OF LOWER EXTREMITY: Status: ACTIVE | Noted: 2021-05-01

## 2021-05-01 PROBLEM — I70.229 CRITICAL LIMB ISCHEMIA WITH HISTORY OF REVASCULARIZATION OF SAME EXTREMITY: Status: ACTIVE | Noted: 2021-05-01

## 2021-05-01 LAB
ALBUMIN SERPL BCP-MCNC: 1.5 G/DL (ref 3.5–5.2)
ALP SERPL-CCNC: 80 U/L (ref 55–135)
ALT SERPL W/O P-5'-P-CCNC: <5 U/L (ref 10–44)
ANION GAP SERPL CALC-SCNC: 7 MMOL/L (ref 8–16)
ANISOCYTOSIS BLD QL SMEAR: SLIGHT
APTT BLDCRRT: 42.7 SEC (ref 21–32)
AST SERPL-CCNC: 10 U/L (ref 10–40)
BASOPHILS # BLD AUTO: 0.04 K/UL (ref 0–0.2)
BASOPHILS # BLD AUTO: 0.04 K/UL (ref 0–0.2)
BASOPHILS NFR BLD: 0.2 % (ref 0–1.9)
BASOPHILS NFR BLD: 0.2 % (ref 0–1.9)
BILIRUB SERPL-MCNC: 0.2 MG/DL (ref 0.1–1)
BLD PROD TYP BPU: NORMAL
BLD PROD TYP BPU: NORMAL
BLOOD UNIT EXPIRATION DATE: NORMAL
BLOOD UNIT EXPIRATION DATE: NORMAL
BLOOD UNIT TYPE CODE: 5100
BLOOD UNIT TYPE CODE: 5100
BLOOD UNIT TYPE: NORMAL
BLOOD UNIT TYPE: NORMAL
BUN SERPL-MCNC: 24 MG/DL (ref 6–20)
BURR CELLS BLD QL SMEAR: ABNORMAL
CALCIUM SERPL-MCNC: 7.6 MG/DL (ref 8.7–10.5)
CHLORIDE SERPL-SCNC: 113 MMOL/L (ref 95–110)
CO2 SERPL-SCNC: 19 MMOL/L (ref 23–29)
CODING SYSTEM: NORMAL
CODING SYSTEM: NORMAL
CREAT SERPL-MCNC: 1.8 MG/DL (ref 0.5–1.4)
DIFFERENTIAL METHOD: ABNORMAL
DIFFERENTIAL METHOD: ABNORMAL
DISPENSE STATUS: NORMAL
DISPENSE STATUS: NORMAL
EOSINOPHIL # BLD AUTO: 0 K/UL (ref 0–0.5)
EOSINOPHIL # BLD AUTO: 0.1 K/UL (ref 0–0.5)
EOSINOPHIL NFR BLD: 0.1 % (ref 0–8)
EOSINOPHIL NFR BLD: 0.3 % (ref 0–8)
ERYTHROCYTE [DISTWIDTH] IN BLOOD BY AUTOMATED COUNT: 15.9 % (ref 11.5–14.5)
ERYTHROCYTE [DISTWIDTH] IN BLOOD BY AUTOMATED COUNT: 17.3 % (ref 11.5–14.5)
EST. GFR  (AFRICAN AMERICAN): 36.3 ML/MIN/1.73 M^2
EST. GFR  (NON AFRICAN AMERICAN): 31.5 ML/MIN/1.73 M^2
GLUCOSE SERPL-MCNC: 147 MG/DL (ref 70–110)
HCT VFR BLD AUTO: 21.7 % (ref 37–48.5)
HCT VFR BLD AUTO: 29.8 % (ref 37–48.5)
HGB BLD-MCNC: 7.1 G/DL (ref 12–16)
HGB BLD-MCNC: 9.9 G/DL (ref 12–16)
HYPOCHROMIA BLD QL SMEAR: ABNORMAL
IMM GRANULOCYTES # BLD AUTO: 0.08 K/UL (ref 0–0.04)
IMM GRANULOCYTES # BLD AUTO: 0.13 K/UL (ref 0–0.04)
IMM GRANULOCYTES NFR BLD AUTO: 0.5 % (ref 0–0.5)
IMM GRANULOCYTES NFR BLD AUTO: 0.7 % (ref 0–0.5)
LYMPHOCYTES # BLD AUTO: 1.5 K/UL (ref 1–4.8)
LYMPHOCYTES # BLD AUTO: 2.1 K/UL (ref 1–4.8)
LYMPHOCYTES NFR BLD: 11.7 % (ref 18–48)
LYMPHOCYTES NFR BLD: 8.9 % (ref 18–48)
MAGNESIUM SERPL-MCNC: 2 MG/DL (ref 1.6–2.6)
MCH RBC QN AUTO: 29.7 PG (ref 27–31)
MCH RBC QN AUTO: 30.2 PG (ref 27–31)
MCHC RBC AUTO-ENTMCNC: 32.7 G/DL (ref 32–36)
MCHC RBC AUTO-ENTMCNC: 33.2 G/DL (ref 32–36)
MCV RBC AUTO: 90 FL (ref 82–98)
MCV RBC AUTO: 92 FL (ref 82–98)
MONOCYTES # BLD AUTO: 1.7 K/UL (ref 0.3–1)
MONOCYTES # BLD AUTO: 2.4 K/UL (ref 0.3–1)
MONOCYTES NFR BLD: 10.1 % (ref 4–15)
MONOCYTES NFR BLD: 13.1 % (ref 4–15)
NEUTROPHILS # BLD AUTO: 13.2 K/UL (ref 1.8–7.7)
NEUTROPHILS # BLD AUTO: 13.4 K/UL (ref 1.8–7.7)
NEUTROPHILS NFR BLD: 74 % (ref 38–73)
NEUTROPHILS NFR BLD: 80.2 % (ref 38–73)
NRBC BLD-RTO: 0 /100 WBC
NRBC BLD-RTO: 0 /100 WBC
NUM UNITS TRANS PACKED RBC: NORMAL
NUM UNITS TRANS PACKED RBC: NORMAL
OVALOCYTES BLD QL SMEAR: ABNORMAL
PHOSPHATE SERPL-MCNC: 4 MG/DL (ref 2.7–4.5)
PLATELET # BLD AUTO: 226 K/UL (ref 150–450)
PLATELET # BLD AUTO: 262 K/UL (ref 150–450)
PMV BLD AUTO: 11.3 FL (ref 9.2–12.9)
PMV BLD AUTO: 11.5 FL (ref 9.2–12.9)
POCT GLUCOSE: 101 MG/DL (ref 70–110)
POCT GLUCOSE: 115 MG/DL (ref 70–110)
POCT GLUCOSE: 115 MG/DL (ref 70–110)
POCT GLUCOSE: 77 MG/DL (ref 70–110)
POCT GLUCOSE: 84 MG/DL (ref 70–110)
POIKILOCYTOSIS BLD QL SMEAR: SLIGHT
POLYCHROMASIA BLD QL SMEAR: ABNORMAL
POTASSIUM SERPL-SCNC: 3.7 MMOL/L (ref 3.5–5.1)
PROT SERPL-MCNC: 4.9 G/DL (ref 6–8.4)
RBC # BLD AUTO: 2.35 M/UL (ref 4–5.4)
RBC # BLD AUTO: 3.33 M/UL (ref 4–5.4)
SODIUM SERPL-SCNC: 139 MMOL/L (ref 136–145)
WBC # BLD AUTO: 16.48 K/UL (ref 3.9–12.7)
WBC # BLD AUTO: 18.07 K/UL (ref 3.9–12.7)

## 2021-05-01 PROCEDURE — P9016 RBC LEUKOCYTES REDUCED: HCPCS | Performed by: STUDENT IN AN ORGANIZED HEALTH CARE EDUCATION/TRAINING PROGRAM

## 2021-05-01 PROCEDURE — 63600175 PHARM REV CODE 636 W HCPCS: Performed by: STUDENT IN AN ORGANIZED HEALTH CARE EDUCATION/TRAINING PROGRAM

## 2021-05-01 PROCEDURE — 63600175 PHARM REV CODE 636 W HCPCS: Performed by: ANESTHESIOLOGY

## 2021-05-01 PROCEDURE — 99233 PR SUBSEQUENT HOSPITAL CARE,LEVL III: ICD-10-PCS | Mod: ,,, | Performed by: SURGERY

## 2021-05-01 PROCEDURE — 25000003 PHARM REV CODE 250: Performed by: STUDENT IN AN ORGANIZED HEALTH CARE EDUCATION/TRAINING PROGRAM

## 2021-05-01 PROCEDURE — 85730 THROMBOPLASTIN TIME PARTIAL: CPT | Performed by: STUDENT IN AN ORGANIZED HEALTH CARE EDUCATION/TRAINING PROGRAM

## 2021-05-01 PROCEDURE — 63600175 PHARM REV CODE 636 W HCPCS: Performed by: NURSE PRACTITIONER

## 2021-05-01 PROCEDURE — 63600175 PHARM REV CODE 636 W HCPCS: Performed by: SURGERY

## 2021-05-01 PROCEDURE — 36415 COLL VENOUS BLD VENIPUNCTURE: CPT | Performed by: STUDENT IN AN ORGANIZED HEALTH CARE EDUCATION/TRAINING PROGRAM

## 2021-05-01 PROCEDURE — 80053 COMPREHEN METABOLIC PANEL: CPT | Performed by: STUDENT IN AN ORGANIZED HEALTH CARE EDUCATION/TRAINING PROGRAM

## 2021-05-01 PROCEDURE — 20600001 HC STEP DOWN PRIVATE ROOM

## 2021-05-01 PROCEDURE — 85025 COMPLETE CBC W/AUTO DIFF WBC: CPT | Performed by: STUDENT IN AN ORGANIZED HEALTH CARE EDUCATION/TRAINING PROGRAM

## 2021-05-01 PROCEDURE — 99233 SBSQ HOSP IP/OBS HIGH 50: CPT | Mod: ,,, | Performed by: SURGERY

## 2021-05-01 PROCEDURE — 85025 COMPLETE CBC W/AUTO DIFF WBC: CPT | Mod: 91 | Performed by: STUDENT IN AN ORGANIZED HEALTH CARE EDUCATION/TRAINING PROGRAM

## 2021-05-01 PROCEDURE — 83735 ASSAY OF MAGNESIUM: CPT | Performed by: STUDENT IN AN ORGANIZED HEALTH CARE EDUCATION/TRAINING PROGRAM

## 2021-05-01 PROCEDURE — 84100 ASSAY OF PHOSPHORUS: CPT | Performed by: STUDENT IN AN ORGANIZED HEALTH CARE EDUCATION/TRAINING PROGRAM

## 2021-05-01 PROCEDURE — 63600175 PHARM REV CODE 636 W HCPCS

## 2021-05-01 RX ORDER — HYDROMORPHONE HYDROCHLORIDE 1 MG/ML
0.5 INJECTION, SOLUTION INTRAMUSCULAR; INTRAVENOUS; SUBCUTANEOUS
Status: DISCONTINUED | OUTPATIENT
Start: 2021-05-01 | End: 2021-05-18

## 2021-05-01 RX ORDER — METOCLOPRAMIDE HYDROCHLORIDE 5 MG/ML
10 INJECTION INTRAMUSCULAR; INTRAVENOUS EVERY 10 MIN PRN
Status: COMPLETED | OUTPATIENT
Start: 2021-05-01 | End: 2021-05-01

## 2021-05-01 RX ORDER — HYDROMORPHONE HYDROCHLORIDE 1 MG/ML
0.2 INJECTION, SOLUTION INTRAMUSCULAR; INTRAVENOUS; SUBCUTANEOUS EVERY 5 MIN PRN
Status: DISCONTINUED | OUTPATIENT
Start: 2021-05-01 | End: 2021-05-01

## 2021-05-01 RX ORDER — ONDANSETRON 2 MG/ML
INJECTION INTRAMUSCULAR; INTRAVENOUS
Status: COMPLETED
Start: 2021-05-01 | End: 2021-05-01

## 2021-05-01 RX ORDER — METOCLOPRAMIDE HYDROCHLORIDE 5 MG/ML
10 INJECTION INTRAMUSCULAR; INTRAVENOUS EVERY 6 HOURS PRN
Status: DISCONTINUED | OUTPATIENT
Start: 2021-05-01 | End: 2021-05-02

## 2021-05-01 RX ORDER — SODIUM CHLORIDE, SODIUM LACTATE, POTASSIUM CHLORIDE, CALCIUM CHLORIDE 600; 310; 30; 20 MG/100ML; MG/100ML; MG/100ML; MG/100ML
INJECTION, SOLUTION INTRAVENOUS CONTINUOUS
Status: DISCONTINUED | OUTPATIENT
Start: 2021-05-01 | End: 2021-05-02

## 2021-05-01 RX ORDER — SODIUM CHLORIDE 9 MG/ML
INJECTION, SOLUTION INTRAVENOUS CONTINUOUS
Status: DISCONTINUED | OUTPATIENT
Start: 2021-05-01 | End: 2021-05-02

## 2021-05-01 RX ORDER — LORAZEPAM 2 MG/ML
0.25 INJECTION INTRAMUSCULAR ONCE AS NEEDED
Status: DISCONTINUED | OUTPATIENT
Start: 2021-05-01 | End: 2021-05-01

## 2021-05-01 RX ORDER — HYDROCODONE BITARTRATE AND ACETAMINOPHEN 500; 5 MG/1; MG/1
TABLET ORAL
Status: DISCONTINUED | OUTPATIENT
Start: 2021-05-01 | End: 2021-05-17

## 2021-05-01 RX ORDER — CEFAZOLIN SODIUM 1 G/3ML
2 INJECTION, POWDER, FOR SOLUTION INTRAMUSCULAR; INTRAVENOUS ONCE
Status: COMPLETED | OUTPATIENT
Start: 2021-05-01 | End: 2021-05-01

## 2021-05-01 RX ORDER — ASPIRIN 325 MG
325 TABLET ORAL DAILY
Status: DISCONTINUED | OUTPATIENT
Start: 2021-05-01 | End: 2021-05-26

## 2021-05-01 RX ORDER — FUROSEMIDE 10 MG/ML
40 INJECTION INTRAMUSCULAR; INTRAVENOUS ONCE
Status: COMPLETED | OUTPATIENT
Start: 2021-05-01 | End: 2021-05-01

## 2021-05-01 RX ADMIN — OXYCODONE AND ACETAMINOPHEN 1 TABLET: 10; 325 TABLET ORAL at 09:05

## 2021-05-01 RX ADMIN — ASPIRIN 325 MG ORAL TABLET 325 MG: 325 PILL ORAL at 09:05

## 2021-05-01 RX ADMIN — ALLOPURINOL 100 MG: 100 TABLET ORAL at 09:05

## 2021-05-01 RX ADMIN — CARVEDILOL 6.25 MG: 6.25 TABLET, FILM COATED ORAL at 10:05

## 2021-05-01 RX ADMIN — MUPIROCIN 1 G: 20 OINTMENT TOPICAL at 10:05

## 2021-05-01 RX ADMIN — ONDANSETRON 4 MG: 2 INJECTION INTRAMUSCULAR; INTRAVENOUS at 11:05

## 2021-05-01 RX ADMIN — SODIUM CHLORIDE, SODIUM LACTATE, POTASSIUM CHLORIDE, AND CALCIUM CHLORIDE: .6; .31; .03; .02 INJECTION, SOLUTION INTRAVENOUS at 04:05

## 2021-05-01 RX ADMIN — SODIUM CHLORIDE, SODIUM LACTATE, POTASSIUM CHLORIDE, AND CALCIUM CHLORIDE: .6; .31; .03; .02 INJECTION, SOLUTION INTRAVENOUS at 07:05

## 2021-05-01 RX ADMIN — ATORVASTATIN CALCIUM 80 MG: 20 TABLET, FILM COATED ORAL at 10:05

## 2021-05-01 RX ADMIN — HYDROMORPHONE HYDROCHLORIDE 0.5 MG: 1 INJECTION, SOLUTION INTRAMUSCULAR; INTRAVENOUS; SUBCUTANEOUS at 11:05

## 2021-05-01 RX ADMIN — OXYCODONE AND ACETAMINOPHEN 1 TABLET: 10; 325 TABLET ORAL at 07:05

## 2021-05-01 RX ADMIN — MUPIROCIN 1 G: 20 OINTMENT TOPICAL at 09:05

## 2021-05-01 RX ADMIN — INSULIN ASPART 3 UNITS: 100 INJECTION, SOLUTION INTRAVENOUS; SUBCUTANEOUS at 01:05

## 2021-05-01 RX ADMIN — INSULIN ASPART 3 UNITS: 100 INJECTION, SOLUTION INTRAVENOUS; SUBCUTANEOUS at 05:05

## 2021-05-01 RX ADMIN — Medication: at 06:05

## 2021-05-01 RX ADMIN — METOCLOPRAMIDE 10 MG: 5 INJECTION, SOLUTION INTRAMUSCULAR; INTRAVENOUS at 01:05

## 2021-05-01 RX ADMIN — OXYCODONE AND ACETAMINOPHEN 1 TABLET: 10; 325 TABLET ORAL at 02:05

## 2021-05-01 RX ADMIN — HYDROMORPHONE HYDROCHLORIDE 0.5 MG: 1 INJECTION, SOLUTION INTRAMUSCULAR; INTRAVENOUS; SUBCUTANEOUS at 10:05

## 2021-05-01 RX ADMIN — CEFAZOLIN 2 G: 330 INJECTION, POWDER, FOR SOLUTION INTRAMUSCULAR; INTRAVENOUS at 01:05

## 2021-05-01 RX ADMIN — HEPARIN SODIUM AND DEXTROSE 14 UNITS/KG/HR: 10000; 5 INJECTION INTRAVENOUS at 07:05

## 2021-05-01 RX ADMIN — INSULIN ASPART 3 UNITS: 100 INJECTION, SOLUTION INTRAVENOUS; SUBCUTANEOUS at 08:05

## 2021-05-01 RX ADMIN — SODIUM CHLORIDE, SODIUM LACTATE, POTASSIUM CHLORIDE, AND CALCIUM CHLORIDE 500 ML: .6; .31; .03; .02 INJECTION, SOLUTION INTRAVENOUS at 01:05

## 2021-05-01 RX ADMIN — FUROSEMIDE 40 MG: 10 INJECTION, SOLUTION INTRAMUSCULAR; INTRAVENOUS at 09:05

## 2021-05-02 LAB
ALBUMIN SERPL BCP-MCNC: 1.6 G/DL (ref 3.5–5.2)
ALP SERPL-CCNC: 106 U/L (ref 55–135)
ALT SERPL W/O P-5'-P-CCNC: <5 U/L (ref 10–44)
ANION GAP SERPL CALC-SCNC: 12 MMOL/L (ref 8–16)
APTT BLDCRRT: 22.5 SEC (ref 21–32)
AST SERPL-CCNC: 22 U/L (ref 10–40)
BASOPHILS # BLD AUTO: 0.05 K/UL (ref 0–0.2)
BASOPHILS NFR BLD: 0.3 % (ref 0–1.9)
BILIRUB SERPL-MCNC: 0.3 MG/DL (ref 0.1–1)
BUN SERPL-MCNC: 28 MG/DL (ref 6–20)
CALCIUM SERPL-MCNC: 8 MG/DL (ref 8.7–10.5)
CHLORIDE SERPL-SCNC: 110 MMOL/L (ref 95–110)
CO2 SERPL-SCNC: 17 MMOL/L (ref 23–29)
CREAT SERPL-MCNC: 2.1 MG/DL (ref 0.5–1.4)
DIFFERENTIAL METHOD: ABNORMAL
EOSINOPHIL # BLD AUTO: 0.1 K/UL (ref 0–0.5)
EOSINOPHIL NFR BLD: 0.3 % (ref 0–8)
ERYTHROCYTE [DISTWIDTH] IN BLOOD BY AUTOMATED COUNT: 17.5 % (ref 11.5–14.5)
EST. GFR  (AFRICAN AMERICAN): 30.1 ML/MIN/1.73 M^2
EST. GFR  (NON AFRICAN AMERICAN): 26.1 ML/MIN/1.73 M^2
FACT X PPP CHRO-ACNC: 0.19 IU/ML (ref 0.3–0.7)
FACT X PPP CHRO-ACNC: 0.62 IU/ML (ref 0.3–0.7)
GLUCOSE SERPL-MCNC: 79 MG/DL (ref 70–110)
HCT VFR BLD AUTO: 27.9 % (ref 37–48.5)
HGB BLD-MCNC: 9.6 G/DL (ref 12–16)
IMM GRANULOCYTES # BLD AUTO: 0.26 K/UL (ref 0–0.04)
IMM GRANULOCYTES NFR BLD AUTO: 1.6 % (ref 0–0.5)
LYMPHOCYTES # BLD AUTO: 1.7 K/UL (ref 1–4.8)
LYMPHOCYTES NFR BLD: 10.2 % (ref 18–48)
MAGNESIUM SERPL-MCNC: 2 MG/DL (ref 1.6–2.6)
MCH RBC QN AUTO: 29.8 PG (ref 27–31)
MCHC RBC AUTO-ENTMCNC: 34.4 G/DL (ref 32–36)
MCV RBC AUTO: 87 FL (ref 82–98)
MONOCYTES # BLD AUTO: 1.9 K/UL (ref 0.3–1)
MONOCYTES NFR BLD: 11.5 % (ref 4–15)
NEUTROPHILS # BLD AUTO: 12.5 K/UL (ref 1.8–7.7)
NEUTROPHILS NFR BLD: 76.1 % (ref 38–73)
NRBC BLD-RTO: 0 /100 WBC
PHOSPHATE SERPL-MCNC: 4.8 MG/DL (ref 2.7–4.5)
PLATELET # BLD AUTO: 244 K/UL (ref 150–450)
PMV BLD AUTO: 11.5 FL (ref 9.2–12.9)
POCT GLUCOSE: 102 MG/DL (ref 70–110)
POCT GLUCOSE: 86 MG/DL (ref 70–110)
POCT GLUCOSE: 90 MG/DL (ref 70–110)
POCT GLUCOSE: 94 MG/DL (ref 70–110)
POTASSIUM SERPL-SCNC: 4.9 MMOL/L (ref 3.5–5.1)
PROT SERPL-MCNC: 5.9 G/DL (ref 6–8.4)
RBC # BLD AUTO: 3.22 M/UL (ref 4–5.4)
SODIUM SERPL-SCNC: 139 MMOL/L (ref 136–145)
WBC # BLD AUTO: 16.47 K/UL (ref 3.9–12.7)

## 2021-05-02 PROCEDURE — 25000003 PHARM REV CODE 250: Performed by: STUDENT IN AN ORGANIZED HEALTH CARE EDUCATION/TRAINING PROGRAM

## 2021-05-02 PROCEDURE — 85025 COMPLETE CBC W/AUTO DIFF WBC: CPT | Performed by: STUDENT IN AN ORGANIZED HEALTH CARE EDUCATION/TRAINING PROGRAM

## 2021-05-02 PROCEDURE — 85520 HEPARIN ASSAY: CPT | Mod: 91 | Performed by: SURGERY

## 2021-05-02 PROCEDURE — 63600175 PHARM REV CODE 636 W HCPCS: Performed by: STUDENT IN AN ORGANIZED HEALTH CARE EDUCATION/TRAINING PROGRAM

## 2021-05-02 PROCEDURE — 99223 1ST HOSP IP/OBS HIGH 75: CPT | Mod: GC,,, | Performed by: INTERNAL MEDICINE

## 2021-05-02 PROCEDURE — 36415 COLL VENOUS BLD VENIPUNCTURE: CPT | Performed by: SURGERY

## 2021-05-02 PROCEDURE — 84100 ASSAY OF PHOSPHORUS: CPT | Performed by: STUDENT IN AN ORGANIZED HEALTH CARE EDUCATION/TRAINING PROGRAM

## 2021-05-02 PROCEDURE — 20600001 HC STEP DOWN PRIVATE ROOM

## 2021-05-02 PROCEDURE — 83735 ASSAY OF MAGNESIUM: CPT | Performed by: STUDENT IN AN ORGANIZED HEALTH CARE EDUCATION/TRAINING PROGRAM

## 2021-05-02 PROCEDURE — 94761 N-INVAS EAR/PLS OXIMETRY MLT: CPT

## 2021-05-02 PROCEDURE — 94799 UNLISTED PULMONARY SVC/PX: CPT

## 2021-05-02 PROCEDURE — 99223 PR INITIAL HOSPITAL CARE,LEVL III: ICD-10-PCS | Mod: GC,,, | Performed by: INTERNAL MEDICINE

## 2021-05-02 PROCEDURE — 85730 THROMBOPLASTIN TIME PARTIAL: CPT | Performed by: STUDENT IN AN ORGANIZED HEALTH CARE EDUCATION/TRAINING PROGRAM

## 2021-05-02 PROCEDURE — 36415 COLL VENOUS BLD VENIPUNCTURE: CPT | Performed by: STUDENT IN AN ORGANIZED HEALTH CARE EDUCATION/TRAINING PROGRAM

## 2021-05-02 PROCEDURE — 80053 COMPREHEN METABOLIC PANEL: CPT | Performed by: STUDENT IN AN ORGANIZED HEALTH CARE EDUCATION/TRAINING PROGRAM

## 2021-05-02 RX ORDER — CALCIUM CARBONATE 200(500)MG
500 TABLET,CHEWABLE ORAL 3 TIMES DAILY PRN
Status: DISCONTINUED | OUTPATIENT
Start: 2021-05-02 | End: 2021-05-18

## 2021-05-02 RX ORDER — METHOCARBAMOL 500 MG/1
500 TABLET, FILM COATED ORAL EVERY 8 HOURS PRN
Status: DISCONTINUED | OUTPATIENT
Start: 2021-05-02 | End: 2021-05-21

## 2021-05-02 RX ORDER — HEPARIN SODIUM,PORCINE/D5W 25000/250
0-40 INTRAVENOUS SOLUTION INTRAVENOUS CONTINUOUS
Status: DISCONTINUED | OUTPATIENT
Start: 2021-05-02 | End: 2021-05-04

## 2021-05-02 RX ORDER — OXYCODONE AND ACETAMINOPHEN 5; 325 MG/1; MG/1
1 TABLET ORAL EVERY 4 HOURS PRN
Status: DISCONTINUED | OUTPATIENT
Start: 2021-05-02 | End: 2021-05-18

## 2021-05-02 RX ORDER — OXYCODONE AND ACETAMINOPHEN 10; 325 MG/1; MG/1
1 TABLET ORAL EVERY 4 HOURS PRN
Status: DISCONTINUED | OUTPATIENT
Start: 2021-05-02 | End: 2021-05-18

## 2021-05-02 RX ADMIN — HEPARIN SODIUM AND DEXTROSE 18 UNITS/KG/HR: 10000; 5 INJECTION INTRAVENOUS at 03:05

## 2021-05-02 RX ADMIN — OXYCODONE AND ACETAMINOPHEN 1 TABLET: 10; 325 TABLET ORAL at 11:05

## 2021-05-02 RX ADMIN — METHOCARBAMOL 500 MG: 500 TABLET ORAL at 05:05

## 2021-05-02 RX ADMIN — CALCIUM CARBONATE (ANTACID) CHEW TAB 500 MG 500 MG: 500 CHEW TAB at 11:05

## 2021-05-02 RX ADMIN — ALLOPURINOL 100 MG: 100 TABLET ORAL at 08:05

## 2021-05-02 RX ADMIN — SODIUM CHLORIDE, SODIUM LACTATE, POTASSIUM CHLORIDE, AND CALCIUM CHLORIDE: .6; .31; .03; .02 INJECTION, SOLUTION INTRAVENOUS at 07:05

## 2021-05-02 RX ADMIN — ASPIRIN 325 MG ORAL TABLET 325 MG: 325 PILL ORAL at 08:05

## 2021-05-02 RX ADMIN — HYDROMORPHONE HYDROCHLORIDE 0.5 MG: 1 INJECTION, SOLUTION INTRAMUSCULAR; INTRAVENOUS; SUBCUTANEOUS at 07:05

## 2021-05-02 RX ADMIN — SODIUM CHLORIDE, SODIUM LACTATE, POTASSIUM CHLORIDE, AND CALCIUM CHLORIDE: .6; .31; .03; .02 INJECTION, SOLUTION INTRAVENOUS at 10:05

## 2021-05-02 RX ADMIN — MUPIROCIN 1 G: 20 OINTMENT TOPICAL at 08:05

## 2021-05-02 RX ADMIN — SODIUM CHLORIDE, SODIUM LACTATE, POTASSIUM CHLORIDE, AND CALCIUM CHLORIDE: .6; .31; .03; .02 INJECTION, SOLUTION INTRAVENOUS at 05:05

## 2021-05-02 RX ADMIN — INSULIN ASPART 3 UNITS: 100 INJECTION, SOLUTION INTRAVENOUS; SUBCUTANEOUS at 01:05

## 2021-05-02 RX ADMIN — CARVEDILOL 6.25 MG: 6.25 TABLET, FILM COATED ORAL at 08:05

## 2021-05-02 RX ADMIN — METHOCARBAMOL 500 MG: 500 TABLET ORAL at 09:05

## 2021-05-02 RX ADMIN — ATORVASTATIN CALCIUM 80 MG: 20 TABLET, FILM COATED ORAL at 08:05

## 2021-05-02 RX ADMIN — HYDROMORPHONE HYDROCHLORIDE 0.5 MG: 1 INJECTION, SOLUTION INTRAMUSCULAR; INTRAVENOUS; SUBCUTANEOUS at 01:05

## 2021-05-02 RX ADMIN — OXYCODONE AND ACETAMINOPHEN 1 TABLET: 10; 325 TABLET ORAL at 01:05

## 2021-05-02 RX ADMIN — HYDROMORPHONE HYDROCHLORIDE 0.5 MG: 1 INJECTION, SOLUTION INTRAMUSCULAR; INTRAVENOUS; SUBCUTANEOUS at 02:05

## 2021-05-02 RX ADMIN — HYDROCODONE BITARTRATE AND ACETAMINOPHEN 1 TABLET: 5; 325 TABLET ORAL at 12:05

## 2021-05-02 RX ADMIN — HEPARIN SODIUM AND DEXTROSE 17.55 UNITS/KG/HR: 10000; 5 INJECTION INTRAVENOUS at 10:05

## 2021-05-02 RX ADMIN — INSULIN ASPART 3 UNITS: 100 INJECTION, SOLUTION INTRAVENOUS; SUBCUTANEOUS at 08:05

## 2021-05-02 RX ADMIN — METHOCARBAMOL 500 MG: 500 TABLET ORAL at 01:05

## 2021-05-03 ENCOUNTER — ANESTHESIA EVENT (OUTPATIENT)
Dept: SURGERY | Facility: HOSPITAL | Age: 56
DRG: 239 | End: 2021-05-03
Payer: MEDICARE

## 2021-05-03 LAB
ALBUMIN SERPL BCP-MCNC: 1.3 G/DL (ref 3.5–5.2)
ALP SERPL-CCNC: 89 U/L (ref 55–135)
ALT SERPL W/O P-5'-P-CCNC: <5 U/L (ref 10–44)
ANION GAP SERPL CALC-SCNC: 7 MMOL/L (ref 8–16)
AST SERPL-CCNC: 14 U/L (ref 10–40)
BASOPHILS # BLD AUTO: 0.07 K/UL (ref 0–0.2)
BASOPHILS NFR BLD: 0.5 % (ref 0–1.9)
BILIRUB SERPL-MCNC: 0.4 MG/DL (ref 0.1–1)
BUN SERPL-MCNC: 25 MG/DL (ref 6–20)
CALCIUM SERPL-MCNC: 7.9 MG/DL (ref 8.7–10.5)
CHLORIDE SERPL-SCNC: 108 MMOL/L (ref 95–110)
CO2 SERPL-SCNC: 20 MMOL/L (ref 23–29)
CREAT SERPL-MCNC: 1.7 MG/DL (ref 0.5–1.4)
DIFFERENTIAL METHOD: ABNORMAL
EOSINOPHIL # BLD AUTO: 0.2 K/UL (ref 0–0.5)
EOSINOPHIL NFR BLD: 1.3 % (ref 0–8)
ERYTHROCYTE [DISTWIDTH] IN BLOOD BY AUTOMATED COUNT: 17.2 % (ref 11.5–14.5)
EST. GFR  (AFRICAN AMERICAN): 38.9 ML/MIN/1.73 M^2
EST. GFR  (NON AFRICAN AMERICAN): 33.7 ML/MIN/1.73 M^2
FACT X PPP CHRO-ACNC: 0.41 IU/ML (ref 0.3–0.7)
FACT X PPP CHRO-ACNC: 0.51 IU/ML (ref 0.3–0.7)
FACT X PPP CHRO-ACNC: 0.61 IU/ML (ref 0.3–0.7)
FACT X PPP CHRO-ACNC: 0.64 IU/ML (ref 0.3–0.7)
GLUCOSE SERPL-MCNC: 92 MG/DL (ref 70–110)
HCT VFR BLD AUTO: 25.3 % (ref 37–48.5)
HGB BLD-MCNC: 8.1 G/DL (ref 12–16)
IMM GRANULOCYTES # BLD AUTO: 0.05 K/UL (ref 0–0.04)
IMM GRANULOCYTES NFR BLD AUTO: 0.4 % (ref 0–0.5)
LYMPHOCYTES # BLD AUTO: 1.9 K/UL (ref 1–4.8)
LYMPHOCYTES NFR BLD: 14.8 % (ref 18–48)
MAGNESIUM SERPL-MCNC: 1.9 MG/DL (ref 1.6–2.6)
MCH RBC QN AUTO: 28.8 PG (ref 27–31)
MCHC RBC AUTO-ENTMCNC: 32 G/DL (ref 32–36)
MCV RBC AUTO: 90 FL (ref 82–98)
MONOCYTES # BLD AUTO: 1.5 K/UL (ref 0.3–1)
MONOCYTES NFR BLD: 11.9 % (ref 4–15)
NEUTROPHILS # BLD AUTO: 9.1 K/UL (ref 1.8–7.7)
NEUTROPHILS NFR BLD: 71.1 % (ref 38–73)
NRBC BLD-RTO: 0 /100 WBC
PHOSPHATE SERPL-MCNC: 4.1 MG/DL (ref 2.7–4.5)
PLATELET # BLD AUTO: 258 K/UL (ref 150–450)
PMV BLD AUTO: 10.5 FL (ref 9.2–12.9)
POCT GLUCOSE: 115 MG/DL (ref 70–110)
POCT GLUCOSE: 161 MG/DL (ref 70–110)
POCT GLUCOSE: 169 MG/DL (ref 70–110)
POCT GLUCOSE: 170 MG/DL (ref 70–110)
POTASSIUM SERPL-SCNC: 3.8 MMOL/L (ref 3.5–5.1)
PROT SERPL-MCNC: 5.1 G/DL (ref 6–8.4)
RBC # BLD AUTO: 2.81 M/UL (ref 4–5.4)
SODIUM SERPL-SCNC: 135 MMOL/L (ref 136–145)
WBC # BLD AUTO: 12.75 K/UL (ref 3.9–12.7)

## 2021-05-03 PROCEDURE — 25000003 PHARM REV CODE 250: Performed by: STUDENT IN AN ORGANIZED HEALTH CARE EDUCATION/TRAINING PROGRAM

## 2021-05-03 PROCEDURE — 94761 N-INVAS EAR/PLS OXIMETRY MLT: CPT

## 2021-05-03 PROCEDURE — 94799 UNLISTED PULMONARY SVC/PX: CPT

## 2021-05-03 PROCEDURE — 83735 ASSAY OF MAGNESIUM: CPT | Performed by: STUDENT IN AN ORGANIZED HEALTH CARE EDUCATION/TRAINING PROGRAM

## 2021-05-03 PROCEDURE — 85520 HEPARIN ASSAY: CPT | Performed by: SURGERY

## 2021-05-03 PROCEDURE — 99233 PR SUBSEQUENT HOSPITAL CARE,LEVL III: ICD-10-PCS | Mod: ,,, | Performed by: PODIATRIST

## 2021-05-03 PROCEDURE — 99024 PR POST-OP FOLLOW-UP VISIT: ICD-10-PCS | Mod: POP,,, | Performed by: SURGERY

## 2021-05-03 PROCEDURE — 99233 SBSQ HOSP IP/OBS HIGH 50: CPT | Mod: ,,, | Performed by: PODIATRIST

## 2021-05-03 PROCEDURE — 99024 POSTOP FOLLOW-UP VISIT: CPT | Mod: POP,,, | Performed by: SURGERY

## 2021-05-03 PROCEDURE — 20600001 HC STEP DOWN PRIVATE ROOM

## 2021-05-03 PROCEDURE — 80053 COMPREHEN METABOLIC PANEL: CPT | Performed by: STUDENT IN AN ORGANIZED HEALTH CARE EDUCATION/TRAINING PROGRAM

## 2021-05-03 PROCEDURE — 84100 ASSAY OF PHOSPHORUS: CPT | Performed by: STUDENT IN AN ORGANIZED HEALTH CARE EDUCATION/TRAINING PROGRAM

## 2021-05-03 PROCEDURE — U0003 INFECTIOUS AGENT DETECTION BY NUCLEIC ACID (DNA OR RNA); SEVERE ACUTE RESPIRATORY SYNDROME CORONAVIRUS 2 (SARS-COV-2) (CORONAVIRUS DISEASE [COVID-19]), AMPLIFIED PROBE TECHNIQUE, MAKING USE OF HIGH THROUGHPUT TECHNOLOGIES AS DESCRIBED BY CMS-2020-01-R: HCPCS | Performed by: STUDENT IN AN ORGANIZED HEALTH CARE EDUCATION/TRAINING PROGRAM

## 2021-05-03 PROCEDURE — 85520 HEPARIN ASSAY: CPT | Mod: 91 | Performed by: SURGERY

## 2021-05-03 PROCEDURE — U0005 INFEC AGEN DETEC AMPLI PROBE: HCPCS | Performed by: STUDENT IN AN ORGANIZED HEALTH CARE EDUCATION/TRAINING PROGRAM

## 2021-05-03 PROCEDURE — 63600175 PHARM REV CODE 636 W HCPCS: Performed by: STUDENT IN AN ORGANIZED HEALTH CARE EDUCATION/TRAINING PROGRAM

## 2021-05-03 PROCEDURE — 85025 COMPLETE CBC W/AUTO DIFF WBC: CPT | Performed by: STUDENT IN AN ORGANIZED HEALTH CARE EDUCATION/TRAINING PROGRAM

## 2021-05-03 RX ORDER — LABETALOL HCL 20 MG/4 ML
10 SYRINGE (ML) INTRAVENOUS EVERY 6 HOURS PRN
Status: DISCONTINUED | OUTPATIENT
Start: 2021-05-03 | End: 2021-05-18

## 2021-05-03 RX ORDER — HYDRALAZINE HYDROCHLORIDE 50 MG/1
50 TABLET, FILM COATED ORAL EVERY 8 HOURS
Status: DISCONTINUED | OUTPATIENT
Start: 2021-05-03 | End: 2021-05-12

## 2021-05-03 RX ADMIN — HYDRALAZINE HYDROCHLORIDE 50 MG: 50 TABLET, FILM COATED ORAL at 01:05

## 2021-05-03 RX ADMIN — OXYCODONE AND ACETAMINOPHEN 1 TABLET: 10; 325 TABLET ORAL at 05:05

## 2021-05-03 RX ADMIN — ALLOPURINOL 100 MG: 100 TABLET ORAL at 09:05

## 2021-05-03 RX ADMIN — HYDROMORPHONE HYDROCHLORIDE 0.5 MG: 1 INJECTION, SOLUTION INTRAMUSCULAR; INTRAVENOUS; SUBCUTANEOUS at 06:05

## 2021-05-03 RX ADMIN — CARVEDILOL 6.25 MG: 6.25 TABLET, FILM COATED ORAL at 08:05

## 2021-05-03 RX ADMIN — HEPARIN SODIUM AND DEXTROSE 20.95 UNITS/KG/HR: 10000; 5 INJECTION INTRAVENOUS at 10:05

## 2021-05-03 RX ADMIN — HYDRALAZINE HYDROCHLORIDE 50 MG: 50 TABLET, FILM COATED ORAL at 08:05

## 2021-05-03 RX ADMIN — ASPIRIN 325 MG ORAL TABLET 325 MG: 325 PILL ORAL at 09:05

## 2021-05-03 RX ADMIN — OXYCODONE AND ACETAMINOPHEN 1 TABLET: 10; 325 TABLET ORAL at 03:05

## 2021-05-03 RX ADMIN — MUPIROCIN 1 G: 20 OINTMENT TOPICAL at 08:05

## 2021-05-03 RX ADMIN — ATORVASTATIN CALCIUM 80 MG: 20 TABLET, FILM COATED ORAL at 08:05

## 2021-05-03 RX ADMIN — HEPARIN SODIUM AND DEXTROSE 28.57 UNITS/KG/HR: 10000; 5 INJECTION INTRAVENOUS at 07:05

## 2021-05-03 RX ADMIN — OXYCODONE AND ACETAMINOPHEN 1 TABLET: 10; 325 TABLET ORAL at 08:05

## 2021-05-03 RX ADMIN — CALCIUM CARBONATE (ANTACID) CHEW TAB 500 MG 500 MG: 500 CHEW TAB at 07:05

## 2021-05-03 RX ADMIN — Medication 10 MG: at 09:05

## 2021-05-04 ENCOUNTER — ANESTHESIA (OUTPATIENT)
Dept: SURGERY | Facility: HOSPITAL | Age: 56
DRG: 239 | End: 2021-05-04
Payer: MEDICARE

## 2021-05-04 LAB
ALBUMIN SERPL BCP-MCNC: 1.3 G/DL (ref 3.5–5.2)
ALP SERPL-CCNC: 89 U/L (ref 55–135)
ALT SERPL W/O P-5'-P-CCNC: <5 U/L (ref 10–44)
ANION GAP SERPL CALC-SCNC: 8 MMOL/L (ref 8–16)
AST SERPL-CCNC: 10 U/L (ref 10–40)
BASOPHILS # BLD AUTO: 0.05 K/UL (ref 0–0.2)
BASOPHILS NFR BLD: 0.4 % (ref 0–1.9)
BILIRUB SERPL-MCNC: 0.3 MG/DL (ref 0.1–1)
BUN SERPL-MCNC: 24 MG/DL (ref 6–20)
CALCIUM SERPL-MCNC: 7.8 MG/DL (ref 8.7–10.5)
CHLORIDE SERPL-SCNC: 107 MMOL/L (ref 95–110)
CO2 SERPL-SCNC: 20 MMOL/L (ref 23–29)
CREAT SERPL-MCNC: 1.7 MG/DL (ref 0.5–1.4)
DIFFERENTIAL METHOD: ABNORMAL
EOSINOPHIL # BLD AUTO: 0.3 K/UL (ref 0–0.5)
EOSINOPHIL NFR BLD: 2.4 % (ref 0–8)
ERYTHROCYTE [DISTWIDTH] IN BLOOD BY AUTOMATED COUNT: 16.9 % (ref 11.5–14.5)
EST. GFR  (AFRICAN AMERICAN): 38.9 ML/MIN/1.73 M^2
EST. GFR  (NON AFRICAN AMERICAN): 33.7 ML/MIN/1.73 M^2
FACT X PPP CHRO-ACNC: 0.53 IU/ML (ref 0.3–0.7)
GLUCOSE SERPL-MCNC: 145 MG/DL (ref 70–110)
GRAM STN SPEC: NORMAL
GRAM STN SPEC: NORMAL
HCT VFR BLD AUTO: 24.3 % (ref 37–48.5)
HGB BLD-MCNC: 7.8 G/DL (ref 12–16)
IMM GRANULOCYTES # BLD AUTO: 0.05 K/UL (ref 0–0.04)
IMM GRANULOCYTES NFR BLD AUTO: 0.4 % (ref 0–0.5)
LYMPHOCYTES # BLD AUTO: 1.8 K/UL (ref 1–4.8)
LYMPHOCYTES NFR BLD: 15.6 % (ref 18–48)
MAGNESIUM SERPL-MCNC: 1.9 MG/DL (ref 1.6–2.6)
MCH RBC QN AUTO: 29.3 PG (ref 27–31)
MCHC RBC AUTO-ENTMCNC: 32.1 G/DL (ref 32–36)
MCV RBC AUTO: 91 FL (ref 82–98)
MONOCYTES # BLD AUTO: 1.6 K/UL (ref 0.3–1)
MONOCYTES NFR BLD: 14.1 % (ref 4–15)
NEUTROPHILS # BLD AUTO: 7.6 K/UL (ref 1.8–7.7)
NEUTROPHILS NFR BLD: 67.1 % (ref 38–73)
NRBC BLD-RTO: 0 /100 WBC
PHOSPHATE SERPL-MCNC: 3.7 MG/DL (ref 2.7–4.5)
PLATELET # BLD AUTO: 270 K/UL (ref 150–450)
PMV BLD AUTO: 10.5 FL (ref 9.2–12.9)
POCT GLUCOSE: 133 MG/DL (ref 70–110)
POCT GLUCOSE: 140 MG/DL (ref 70–110)
POCT GLUCOSE: 150 MG/DL (ref 70–110)
POTASSIUM SERPL-SCNC: 3.5 MMOL/L (ref 3.5–5.1)
PROT SERPL-MCNC: 5.1 G/DL (ref 6–8.4)
RBC # BLD AUTO: 2.66 M/UL (ref 4–5.4)
SARS-COV-2 RNA RESP QL NAA+PROBE: NOT DETECTED
SODIUM SERPL-SCNC: 135 MMOL/L (ref 136–145)
WBC # BLD AUTO: 11.33 K/UL (ref 3.9–12.7)

## 2021-05-04 PROCEDURE — 71000033 HC RECOVERY, INTIAL HOUR: Performed by: PODIATRIST

## 2021-05-04 PROCEDURE — 71000016 HC POSTOP RECOV ADDL HR: Performed by: PODIATRIST

## 2021-05-04 PROCEDURE — D9220A PRA ANESTHESIA: Mod: ,,, | Performed by: ANESTHESIOLOGY

## 2021-05-04 PROCEDURE — D9220A PRA ANESTHESIA: ICD-10-PCS | Mod: ,,, | Performed by: ANESTHESIOLOGY

## 2021-05-04 PROCEDURE — 37000009 HC ANESTHESIA EA ADD 15 MINS: Performed by: PODIATRIST

## 2021-05-04 PROCEDURE — 25000003 PHARM REV CODE 250: Performed by: ANESTHESIOLOGY

## 2021-05-04 PROCEDURE — 25000003 PHARM REV CODE 250: Performed by: STUDENT IN AN ORGANIZED HEALTH CARE EDUCATION/TRAINING PROGRAM

## 2021-05-04 PROCEDURE — 80053 COMPREHEN METABOLIC PANEL: CPT | Performed by: STUDENT IN AN ORGANIZED HEALTH CARE EDUCATION/TRAINING PROGRAM

## 2021-05-04 PROCEDURE — 87077 CULTURE AEROBIC IDENTIFY: CPT | Performed by: PODIATRIST

## 2021-05-04 PROCEDURE — 87075 CULTR BACTERIA EXCEPT BLOOD: CPT | Performed by: PODIATRIST

## 2021-05-04 PROCEDURE — 15275 SKIN SUB GRAFT FACE/NK/HF/G: CPT | Mod: ,,, | Performed by: PODIATRIST

## 2021-05-04 PROCEDURE — 15275 PR SKIN SUB GRAFT FACE/NK/HF/G UP TO 100 SQCM: ICD-10-PCS | Mod: ,,, | Performed by: PODIATRIST

## 2021-05-04 PROCEDURE — 25000003 PHARM REV CODE 250: Performed by: PODIATRIST

## 2021-05-04 PROCEDURE — 88305 TISSUE EXAM BY PATHOLOGIST: ICD-10-PCS | Mod: 26,,, | Performed by: STUDENT IN AN ORGANIZED HEALTH CARE EDUCATION/TRAINING PROGRAM

## 2021-05-04 PROCEDURE — 82962 GLUCOSE BLOOD TEST: CPT | Performed by: PODIATRIST

## 2021-05-04 PROCEDURE — 99232 SBSQ HOSP IP/OBS MODERATE 35: CPT | Mod: 25,,, | Performed by: PODIATRIST

## 2021-05-04 PROCEDURE — 36000706: Performed by: PODIATRIST

## 2021-05-04 PROCEDURE — 83735 ASSAY OF MAGNESIUM: CPT | Performed by: STUDENT IN AN ORGANIZED HEALTH CARE EDUCATION/TRAINING PROGRAM

## 2021-05-04 PROCEDURE — 87102 FUNGUS ISOLATION CULTURE: CPT | Performed by: PODIATRIST

## 2021-05-04 PROCEDURE — 88305 TISSUE EXAM BY PATHOLOGIST: CPT | Mod: 26,,, | Performed by: STUDENT IN AN ORGANIZED HEALTH CARE EDUCATION/TRAINING PROGRAM

## 2021-05-04 PROCEDURE — 27201423 OPTIME MED/SURG SUP & DEVICES STERILE SUPPLY: Performed by: PODIATRIST

## 2021-05-04 PROCEDURE — 87176 TISSUE HOMOGENIZATION CULTR: CPT | Performed by: PODIATRIST

## 2021-05-04 PROCEDURE — 88305 TISSUE EXAM BY PATHOLOGIST: CPT | Performed by: STUDENT IN AN ORGANIZED HEALTH CARE EDUCATION/TRAINING PROGRAM

## 2021-05-04 PROCEDURE — 36000707: Performed by: PODIATRIST

## 2021-05-04 PROCEDURE — 84100 ASSAY OF PHOSPHORUS: CPT | Performed by: STUDENT IN AN ORGANIZED HEALTH CARE EDUCATION/TRAINING PROGRAM

## 2021-05-04 PROCEDURE — 20600001 HC STEP DOWN PRIVATE ROOM

## 2021-05-04 PROCEDURE — 87205 SMEAR GRAM STAIN: CPT | Performed by: PODIATRIST

## 2021-05-04 PROCEDURE — 63600175 PHARM REV CODE 636 W HCPCS: Performed by: ANESTHESIOLOGY

## 2021-05-04 PROCEDURE — 99900035 HC TECH TIME PER 15 MIN (STAT)

## 2021-05-04 PROCEDURE — 37000008 HC ANESTHESIA 1ST 15 MINUTES: Performed by: PODIATRIST

## 2021-05-04 PROCEDURE — 87186 SC STD MICRODIL/AGAR DIL: CPT | Performed by: PODIATRIST

## 2021-05-04 PROCEDURE — 85520 HEPARIN ASSAY: CPT | Performed by: SURGERY

## 2021-05-04 PROCEDURE — 71000015 HC POSTOP RECOV 1ST HR: Performed by: PODIATRIST

## 2021-05-04 PROCEDURE — 87070 CULTURE OTHR SPECIMN AEROBIC: CPT | Performed by: PODIATRIST

## 2021-05-04 PROCEDURE — 94761 N-INVAS EAR/PLS OXIMETRY MLT: CPT

## 2021-05-04 PROCEDURE — 63600175 PHARM REV CODE 636 W HCPCS: Performed by: STUDENT IN AN ORGANIZED HEALTH CARE EDUCATION/TRAINING PROGRAM

## 2021-05-04 PROCEDURE — 99232 PR SUBSEQUENT HOSPITAL CARE,LEVL II: ICD-10-PCS | Mod: 25,,, | Performed by: PODIATRIST

## 2021-05-04 PROCEDURE — 85025 COMPLETE CBC W/AUTO DIFF WBC: CPT | Performed by: STUDENT IN AN ORGANIZED HEALTH CARE EDUCATION/TRAINING PROGRAM

## 2021-05-04 DEVICE — TISSUE HUMAN AMNIO 80MMX40MM: Type: IMPLANTABLE DEVICE | Site: FOOT | Status: FUNCTIONAL

## 2021-05-04 RX ORDER — HYDROMORPHONE HYDROCHLORIDE 1 MG/ML
0.2 INJECTION, SOLUTION INTRAMUSCULAR; INTRAVENOUS; SUBCUTANEOUS EVERY 5 MIN PRN
Status: DISCONTINUED | OUTPATIENT
Start: 2021-05-04 | End: 2021-05-04 | Stop reason: HOSPADM

## 2021-05-04 RX ORDER — BUPIVACAINE HYDROCHLORIDE 5 MG/ML
INJECTION, SOLUTION EPIDURAL; INTRACAUDAL
Status: DISCONTINUED | OUTPATIENT
Start: 2021-05-04 | End: 2021-05-04 | Stop reason: HOSPADM

## 2021-05-04 RX ORDER — VASOPRESSIN 20 [USP'U]/ML
INJECTION, SOLUTION INTRAMUSCULAR; SUBCUTANEOUS
Status: DISCONTINUED | OUTPATIENT
Start: 2021-05-04 | End: 2021-05-04

## 2021-05-04 RX ORDER — DEXMEDETOMIDINE HYDROCHLORIDE 100 UG/ML
INJECTION, SOLUTION INTRAVENOUS
Status: DISCONTINUED | OUTPATIENT
Start: 2021-05-04 | End: 2021-05-04

## 2021-05-04 RX ORDER — MIDAZOLAM HYDROCHLORIDE 1 MG/ML
INJECTION, SOLUTION INTRAMUSCULAR; INTRAVENOUS
Status: DISCONTINUED | OUTPATIENT
Start: 2021-05-04 | End: 2021-05-04

## 2021-05-04 RX ORDER — KETOROLAC TROMETHAMINE 30 MG/ML
15 INJECTION, SOLUTION INTRAMUSCULAR; INTRAVENOUS EVERY 8 HOURS PRN
Status: DISCONTINUED | OUTPATIENT
Start: 2021-05-04 | End: 2021-05-04 | Stop reason: HOSPADM

## 2021-05-04 RX ORDER — OXYCODONE AND ACETAMINOPHEN 5; 325 MG/1; MG/1
1 TABLET ORAL
Status: DISCONTINUED | OUTPATIENT
Start: 2021-05-04 | End: 2021-05-04 | Stop reason: HOSPADM

## 2021-05-04 RX ORDER — FENTANYL CITRATE 50 UG/ML
INJECTION, SOLUTION INTRAMUSCULAR; INTRAVENOUS
Status: DISCONTINUED | OUTPATIENT
Start: 2021-05-04 | End: 2021-05-04

## 2021-05-04 RX ORDER — HALOPERIDOL 5 MG/ML
0.5 INJECTION INTRAMUSCULAR EVERY 10 MIN PRN
Status: DISCONTINUED | OUTPATIENT
Start: 2021-05-04 | End: 2021-05-04 | Stop reason: HOSPADM

## 2021-05-04 RX ORDER — LIDOCAINE HYDROCHLORIDE 10 MG/ML
INJECTION, SOLUTION EPIDURAL; INFILTRATION; INTRACAUDAL; PERINEURAL
Status: DISCONTINUED | OUTPATIENT
Start: 2021-05-04 | End: 2021-05-04 | Stop reason: HOSPADM

## 2021-05-04 RX ORDER — PROPOFOL 10 MG/ML
VIAL (ML) INTRAVENOUS CONTINUOUS PRN
Status: DISCONTINUED | OUTPATIENT
Start: 2021-05-04 | End: 2021-05-04

## 2021-05-04 RX ORDER — HEPARIN SODIUM 10000 [USP'U]/100ML
INJECTION, SOLUTION INTRAVENOUS
Status: DISPENSED
Start: 2021-05-04 | End: 2021-05-05

## 2021-05-04 RX ORDER — KETAMINE HCL IN 0.9 % NACL 50 MG/5 ML
SYRINGE (ML) INTRAVENOUS
Status: DISCONTINUED | OUTPATIENT
Start: 2021-05-04 | End: 2021-05-04

## 2021-05-04 RX ADMIN — Medication 10 MG: at 01:05

## 2021-05-04 RX ADMIN — OXYCODONE AND ACETAMINOPHEN 1 TABLET: 10; 325 TABLET ORAL at 12:05

## 2021-05-04 RX ADMIN — HEPARIN SODIUM AND DEXTROSE 20.95 UNITS/KG/HR: 10000; 5 INJECTION INTRAVENOUS at 03:05

## 2021-05-04 RX ADMIN — FENTANYL CITRATE 100 MCG: 50 INJECTION INTRAMUSCULAR; INTRAVENOUS at 01:05

## 2021-05-04 RX ADMIN — HYDROMORPHONE HYDROCHLORIDE 0.5 MG: 1 INJECTION, SOLUTION INTRAMUSCULAR; INTRAVENOUS; SUBCUTANEOUS at 04:05

## 2021-05-04 RX ADMIN — OXYCODONE AND ACETAMINOPHEN 1 TABLET: 10; 325 TABLET ORAL at 08:05

## 2021-05-04 RX ADMIN — ATORVASTATIN CALCIUM 80 MG: 20 TABLET, FILM COATED ORAL at 08:05

## 2021-05-04 RX ADMIN — VASOPRESSIN 1 UNITS: 20 INJECTION INTRAVENOUS at 01:05

## 2021-05-04 RX ADMIN — OXYCODONE AND ACETAMINOPHEN 1 TABLET: 10; 325 TABLET ORAL at 11:05

## 2021-05-04 RX ADMIN — OXYCODONE AND ACETAMINOPHEN 1 TABLET: 10; 325 TABLET ORAL at 07:05

## 2021-05-04 RX ADMIN — MUPIROCIN 1 G: 20 OINTMENT TOPICAL at 09:05

## 2021-05-04 RX ADMIN — DEXMEDETOMIDINE HYDROCHLORIDE 12 MCG: 100 INJECTION, SOLUTION INTRAVENOUS at 01:05

## 2021-05-04 RX ADMIN — MUPIROCIN 1 G: 20 OINTMENT TOPICAL at 08:05

## 2021-05-04 RX ADMIN — MIDAZOLAM 2 MG: 1 INJECTION INTRAMUSCULAR; INTRAVENOUS at 01:05

## 2021-05-04 RX ADMIN — PROPOFOL 200 MCG/KG/MIN: 10 INJECTION, EMULSION INTRAVENOUS at 01:05

## 2021-05-04 RX ADMIN — ASPIRIN 325 MG ORAL TABLET 325 MG: 325 PILL ORAL at 08:05

## 2021-05-04 RX ADMIN — DEXMEDETOMIDINE HYDROCHLORIDE 16 MCG: 100 INJECTION, SOLUTION INTRAVENOUS at 01:05

## 2021-05-04 RX ADMIN — HYDRALAZINE HYDROCHLORIDE 50 MG: 50 TABLET, FILM COATED ORAL at 10:05

## 2021-05-04 RX ADMIN — CARVEDILOL 6.25 MG: 6.25 TABLET, FILM COATED ORAL at 08:05

## 2021-05-04 RX ADMIN — APIXABAN 10 MG: 5 TABLET, FILM COATED ORAL at 08:05

## 2021-05-04 RX ADMIN — HYDROMORPHONE HYDROCHLORIDE 0.5 MG: 1 INJECTION, SOLUTION INTRAMUSCULAR; INTRAVENOUS; SUBCUTANEOUS at 07:05

## 2021-05-04 RX ADMIN — METHOCARBAMOL 500 MG: 500 TABLET ORAL at 10:05

## 2021-05-04 RX ADMIN — HYDRALAZINE HYDROCHLORIDE 50 MG: 50 TABLET, FILM COATED ORAL at 05:05

## 2021-05-04 RX ADMIN — ALLOPURINOL 100 MG: 100 TABLET ORAL at 08:05

## 2021-05-05 LAB
ALBUMIN SERPL BCP-MCNC: 1.2 G/DL (ref 3.5–5.2)
ALP SERPL-CCNC: 77 U/L (ref 55–135)
ALT SERPL W/O P-5'-P-CCNC: <5 U/L (ref 10–44)
ANION GAP SERPL CALC-SCNC: 8 MMOL/L (ref 8–16)
AST SERPL-CCNC: 9 U/L (ref 10–40)
BASOPHILS # BLD AUTO: 0.03 K/UL (ref 0–0.2)
BASOPHILS NFR BLD: 0.3 % (ref 0–1.9)
BILIRUB SERPL-MCNC: 0.2 MG/DL (ref 0.1–1)
BUN SERPL-MCNC: 26 MG/DL (ref 6–20)
CALCIUM SERPL-MCNC: 7.8 MG/DL (ref 8.7–10.5)
CHLORIDE SERPL-SCNC: 110 MMOL/L (ref 95–110)
CO2 SERPL-SCNC: 19 MMOL/L (ref 23–29)
CREAT SERPL-MCNC: 1.8 MG/DL (ref 0.5–1.4)
DIFFERENTIAL METHOD: ABNORMAL
EOSINOPHIL # BLD AUTO: 0.2 K/UL (ref 0–0.5)
EOSINOPHIL NFR BLD: 2.3 % (ref 0–8)
ERYTHROCYTE [DISTWIDTH] IN BLOOD BY AUTOMATED COUNT: 16.7 % (ref 11.5–14.5)
EST. GFR  (AFRICAN AMERICAN): 36.3 ML/MIN/1.73 M^2
EST. GFR  (NON AFRICAN AMERICAN): 31.5 ML/MIN/1.73 M^2
GLUCOSE SERPL-MCNC: 118 MG/DL (ref 70–110)
HCT VFR BLD AUTO: 22.4 % (ref 37–48.5)
HGB BLD-MCNC: 7.2 G/DL (ref 12–16)
IMM GRANULOCYTES # BLD AUTO: 0.04 K/UL (ref 0–0.04)
IMM GRANULOCYTES NFR BLD AUTO: 0.4 % (ref 0–0.5)
LYMPHOCYTES # BLD AUTO: 1.4 K/UL (ref 1–4.8)
LYMPHOCYTES NFR BLD: 15.6 % (ref 18–48)
MAGNESIUM SERPL-MCNC: 2 MG/DL (ref 1.6–2.6)
MCH RBC QN AUTO: 29.6 PG (ref 27–31)
MCHC RBC AUTO-ENTMCNC: 32.1 G/DL (ref 32–36)
MCV RBC AUTO: 92 FL (ref 82–98)
MONOCYTES # BLD AUTO: 1.3 K/UL (ref 0.3–1)
MONOCYTES NFR BLD: 13.9 % (ref 4–15)
NEUTROPHILS # BLD AUTO: 6.1 K/UL (ref 1.8–7.7)
NEUTROPHILS NFR BLD: 67.5 % (ref 38–73)
NRBC BLD-RTO: 0 /100 WBC
PHOSPHATE SERPL-MCNC: 4.1 MG/DL (ref 2.7–4.5)
PLATELET # BLD AUTO: 267 K/UL (ref 150–450)
PMV BLD AUTO: 10.6 FL (ref 9.2–12.9)
POCT GLUCOSE: 123 MG/DL (ref 70–110)
POCT GLUCOSE: 145 MG/DL (ref 70–110)
POTASSIUM SERPL-SCNC: 3.6 MMOL/L (ref 3.5–5.1)
PROT SERPL-MCNC: 4.9 G/DL (ref 6–8.4)
RBC # BLD AUTO: 2.43 M/UL (ref 4–5.4)
SODIUM SERPL-SCNC: 137 MMOL/L (ref 136–145)
WBC # BLD AUTO: 9.11 K/UL (ref 3.9–12.7)

## 2021-05-05 PROCEDURE — 25000003 PHARM REV CODE 250: Performed by: STUDENT IN AN ORGANIZED HEALTH CARE EDUCATION/TRAINING PROGRAM

## 2021-05-05 PROCEDURE — 80053 COMPREHEN METABOLIC PANEL: CPT | Performed by: STUDENT IN AN ORGANIZED HEALTH CARE EDUCATION/TRAINING PROGRAM

## 2021-05-05 PROCEDURE — 84100 ASSAY OF PHOSPHORUS: CPT | Performed by: STUDENT IN AN ORGANIZED HEALTH CARE EDUCATION/TRAINING PROGRAM

## 2021-05-05 PROCEDURE — 99232 SBSQ HOSP IP/OBS MODERATE 35: CPT | Mod: ,,, | Performed by: PODIATRIST

## 2021-05-05 PROCEDURE — 63600175 PHARM REV CODE 636 W HCPCS: Performed by: STUDENT IN AN ORGANIZED HEALTH CARE EDUCATION/TRAINING PROGRAM

## 2021-05-05 PROCEDURE — 97530 THERAPEUTIC ACTIVITIES: CPT

## 2021-05-05 PROCEDURE — 97535 SELF CARE MNGMENT TRAINING: CPT

## 2021-05-05 PROCEDURE — 83735 ASSAY OF MAGNESIUM: CPT | Performed by: STUDENT IN AN ORGANIZED HEALTH CARE EDUCATION/TRAINING PROGRAM

## 2021-05-05 PROCEDURE — 99232 PR SUBSEQUENT HOSPITAL CARE,LEVL II: ICD-10-PCS | Mod: ,,, | Performed by: NURSE PRACTITIONER

## 2021-05-05 PROCEDURE — 97161 PT EVAL LOW COMPLEX 20 MIN: CPT

## 2021-05-05 PROCEDURE — 97166 OT EVAL MOD COMPLEX 45 MIN: CPT

## 2021-05-05 PROCEDURE — 99222 PR INITIAL HOSPITAL CARE,LEVL II: ICD-10-PCS | Mod: ,,, | Performed by: NURSE PRACTITIONER

## 2021-05-05 PROCEDURE — 99232 SBSQ HOSP IP/OBS MODERATE 35: CPT | Mod: ,,, | Performed by: NURSE PRACTITIONER

## 2021-05-05 PROCEDURE — 20600001 HC STEP DOWN PRIVATE ROOM

## 2021-05-05 PROCEDURE — 99222 1ST HOSP IP/OBS MODERATE 55: CPT | Mod: ,,, | Performed by: NURSE PRACTITIONER

## 2021-05-05 PROCEDURE — 85025 COMPLETE CBC W/AUTO DIFF WBC: CPT | Performed by: STUDENT IN AN ORGANIZED HEALTH CARE EDUCATION/TRAINING PROGRAM

## 2021-05-05 PROCEDURE — 99232 PR SUBSEQUENT HOSPITAL CARE,LEVL II: ICD-10-PCS | Mod: ,,, | Performed by: PODIATRIST

## 2021-05-05 RX ORDER — ACETAMINOPHEN 325 MG/1
650 TABLET ORAL EVERY 6 HOURS
Status: DISCONTINUED | OUTPATIENT
Start: 2021-05-05 | End: 2021-05-18

## 2021-05-05 RX ORDER — CYCLOBENZAPRINE HCL 5 MG
5 TABLET ORAL 3 TIMES DAILY PRN
Status: DISCONTINUED | OUTPATIENT
Start: 2021-05-05 | End: 2021-05-09

## 2021-05-05 RX ORDER — HYDROMORPHONE HYDROCHLORIDE 1 MG/ML
1 INJECTION, SOLUTION INTRAMUSCULAR; INTRAVENOUS; SUBCUTANEOUS EVERY 6 HOURS PRN
Status: DISCONTINUED | OUTPATIENT
Start: 2021-05-05 | End: 2021-05-18

## 2021-05-05 RX ORDER — GABAPENTIN 300 MG/1
300 CAPSULE ORAL 3 TIMES DAILY
Status: DISCONTINUED | OUTPATIENT
Start: 2021-05-05 | End: 2021-05-09

## 2021-05-05 RX ADMIN — METHOCARBAMOL 500 MG: 500 TABLET ORAL at 06:05

## 2021-05-05 RX ADMIN — ASPIRIN 325 MG ORAL TABLET 325 MG: 325 PILL ORAL at 08:05

## 2021-05-05 RX ADMIN — ACETAMINOPHEN 650 MG: 325 TABLET ORAL at 12:05

## 2021-05-05 RX ADMIN — HYDRALAZINE HYDROCHLORIDE 50 MG: 50 TABLET, FILM COATED ORAL at 09:05

## 2021-05-05 RX ADMIN — HYDROMORPHONE HYDROCHLORIDE 1 MG: 1 INJECTION, SOLUTION INTRAMUSCULAR; INTRAVENOUS; SUBCUTANEOUS at 09:05

## 2021-05-05 RX ADMIN — OXYCODONE AND ACETAMINOPHEN 1 TABLET: 10; 325 TABLET ORAL at 05:05

## 2021-05-05 RX ADMIN — SODIUM CHLORIDE, SODIUM LACTATE, POTASSIUM CHLORIDE, AND CALCIUM CHLORIDE 1000 ML: .6; .31; .03; .02 INJECTION, SOLUTION INTRAVENOUS at 04:05

## 2021-05-05 RX ADMIN — APIXABAN 10 MG: 5 TABLET, FILM COATED ORAL at 08:05

## 2021-05-05 RX ADMIN — CARVEDILOL 6.25 MG: 6.25 TABLET, FILM COATED ORAL at 08:05

## 2021-05-05 RX ADMIN — OXYCODONE AND ACETAMINOPHEN 1 TABLET: 10; 325 TABLET ORAL at 09:05

## 2021-05-05 RX ADMIN — HYDROMORPHONE HYDROCHLORIDE 0.5 MG: 1 INJECTION, SOLUTION INTRAMUSCULAR; INTRAVENOUS; SUBCUTANEOUS at 03:05

## 2021-05-05 RX ADMIN — HYDROMORPHONE HYDROCHLORIDE 1 MG: 1 INJECTION, SOLUTION INTRAMUSCULAR; INTRAVENOUS; SUBCUTANEOUS at 03:05

## 2021-05-05 RX ADMIN — OXYCODONE AND ACETAMINOPHEN 1 TABLET: 10; 325 TABLET ORAL at 01:05

## 2021-05-05 RX ADMIN — CYCLOBENZAPRINE HYDROCHLORIDE 5 MG: 5 TABLET, FILM COATED ORAL at 08:05

## 2021-05-05 RX ADMIN — METHOCARBAMOL 500 MG: 500 TABLET ORAL at 01:05

## 2021-05-05 RX ADMIN — HYDRALAZINE HYDROCHLORIDE 50 MG: 50 TABLET, FILM COATED ORAL at 05:05

## 2021-05-05 RX ADMIN — ALLOPURINOL 100 MG: 100 TABLET ORAL at 08:05

## 2021-05-05 RX ADMIN — HYDRALAZINE HYDROCHLORIDE 50 MG: 50 TABLET, FILM COATED ORAL at 01:05

## 2021-05-05 RX ADMIN — HYDROMORPHONE HYDROCHLORIDE 0.5 MG: 1 INJECTION, SOLUTION INTRAMUSCULAR; INTRAVENOUS; SUBCUTANEOUS at 06:05

## 2021-05-05 RX ADMIN — GABAPENTIN 300 MG: 300 CAPSULE ORAL at 08:05

## 2021-05-05 RX ADMIN — ATORVASTATIN CALCIUM 80 MG: 20 TABLET, FILM COATED ORAL at 08:05

## 2021-05-06 PROBLEM — M86.9 OSTEOMYELITIS OF LEFT FOOT: Status: ACTIVE | Noted: 2021-04-30

## 2021-05-06 PROBLEM — Z74.09 IMPAIRED FUNCTIONAL MOBILITY AND ENDURANCE: Status: ACTIVE | Noted: 2021-05-06

## 2021-05-06 LAB
ALBUMIN SERPL BCP-MCNC: 1.4 G/DL (ref 3.5–5.2)
ALP SERPL-CCNC: 87 U/L (ref 55–135)
ALT SERPL W/O P-5'-P-CCNC: 5 U/L (ref 10–44)
ANION GAP SERPL CALC-SCNC: 9 MMOL/L (ref 8–16)
AST SERPL-CCNC: 11 U/L (ref 10–40)
BASOPHILS # BLD AUTO: 0.05 K/UL (ref 0–0.2)
BASOPHILS NFR BLD: 0.5 % (ref 0–1.9)
BILIRUB SERPL-MCNC: 0.3 MG/DL (ref 0.1–1)
BUN SERPL-MCNC: 28 MG/DL (ref 6–20)
CALCIUM SERPL-MCNC: 8 MG/DL (ref 8.7–10.5)
CHLORIDE SERPL-SCNC: 108 MMOL/L (ref 95–110)
CO2 SERPL-SCNC: 18 MMOL/L (ref 23–29)
CREAT SERPL-MCNC: 1.8 MG/DL (ref 0.5–1.4)
DIFFERENTIAL METHOD: ABNORMAL
EOSINOPHIL # BLD AUTO: 0.3 K/UL (ref 0–0.5)
EOSINOPHIL NFR BLD: 2.5 % (ref 0–8)
ERYTHROCYTE [DISTWIDTH] IN BLOOD BY AUTOMATED COUNT: 16.7 % (ref 11.5–14.5)
EST. GFR  (AFRICAN AMERICAN): 36.3 ML/MIN/1.73 M^2
EST. GFR  (NON AFRICAN AMERICAN): 31.5 ML/MIN/1.73 M^2
FINAL PATHOLOGIC DIAGNOSIS: NORMAL
GLUCOSE SERPL-MCNC: 111 MG/DL (ref 70–110)
GROSS: NORMAL
HCT VFR BLD AUTO: 23.4 % (ref 37–48.5)
HGB BLD-MCNC: 7.7 G/DL (ref 12–16)
IMM GRANULOCYTES # BLD AUTO: 0.11 K/UL (ref 0–0.04)
IMM GRANULOCYTES NFR BLD AUTO: 1 % (ref 0–0.5)
LYMPHOCYTES # BLD AUTO: 1.6 K/UL (ref 1–4.8)
LYMPHOCYTES NFR BLD: 14.3 % (ref 18–48)
Lab: NORMAL
MAGNESIUM SERPL-MCNC: 1.9 MG/DL (ref 1.6–2.6)
MCH RBC QN AUTO: 29.5 PG (ref 27–31)
MCHC RBC AUTO-ENTMCNC: 32.9 G/DL (ref 32–36)
MCV RBC AUTO: 90 FL (ref 82–98)
MICROSCOPIC EXAM: NORMAL
MONOCYTES # BLD AUTO: 1.5 K/UL (ref 0.3–1)
MONOCYTES NFR BLD: 13.9 % (ref 4–15)
NEUTROPHILS # BLD AUTO: 7.4 K/UL (ref 1.8–7.7)
NEUTROPHILS NFR BLD: 67.8 % (ref 38–73)
NRBC BLD-RTO: 0 /100 WBC
PHOSPHATE SERPL-MCNC: 4.1 MG/DL (ref 2.7–4.5)
PLATELET # BLD AUTO: 349 K/UL (ref 150–450)
PMV BLD AUTO: 10.9 FL (ref 9.2–12.9)
POCT GLUCOSE: 131 MG/DL (ref 70–110)
POCT GLUCOSE: 139 MG/DL (ref 70–110)
POTASSIUM SERPL-SCNC: 3.7 MMOL/L (ref 3.5–5.1)
PROT SERPL-MCNC: 5.4 G/DL (ref 6–8.4)
RBC # BLD AUTO: 2.61 M/UL (ref 4–5.4)
SODIUM SERPL-SCNC: 135 MMOL/L (ref 136–145)
WBC # BLD AUTO: 10.92 K/UL (ref 3.9–12.7)

## 2021-05-06 PROCEDURE — 25000003 PHARM REV CODE 250: Performed by: STUDENT IN AN ORGANIZED HEALTH CARE EDUCATION/TRAINING PROGRAM

## 2021-05-06 PROCEDURE — 63600175 PHARM REV CODE 636 W HCPCS: Performed by: STUDENT IN AN ORGANIZED HEALTH CARE EDUCATION/TRAINING PROGRAM

## 2021-05-06 PROCEDURE — 97530 THERAPEUTIC ACTIVITIES: CPT

## 2021-05-06 PROCEDURE — 99024 POSTOP FOLLOW-UP VISIT: CPT | Mod: POP,,, | Performed by: SURGERY

## 2021-05-06 PROCEDURE — 83735 ASSAY OF MAGNESIUM: CPT | Performed by: SURGERY

## 2021-05-06 PROCEDURE — 99024 PR POST-OP FOLLOW-UP VISIT: ICD-10-PCS | Mod: POP,,, | Performed by: SURGERY

## 2021-05-06 PROCEDURE — 20600001 HC STEP DOWN PRIVATE ROOM

## 2021-05-06 PROCEDURE — 99232 SBSQ HOSP IP/OBS MODERATE 35: CPT | Mod: ,,, | Performed by: NURSE PRACTITIONER

## 2021-05-06 PROCEDURE — 97110 THERAPEUTIC EXERCISES: CPT

## 2021-05-06 PROCEDURE — 99233 SBSQ HOSP IP/OBS HIGH 50: CPT | Mod: GC,,, | Performed by: PODIATRIST

## 2021-05-06 PROCEDURE — 94761 N-INVAS EAR/PLS OXIMETRY MLT: CPT

## 2021-05-06 PROCEDURE — 84100 ASSAY OF PHOSPHORUS: CPT | Performed by: SURGERY

## 2021-05-06 PROCEDURE — 99223 PR INITIAL HOSPITAL CARE,LEVL III: ICD-10-PCS | Mod: ,,, | Performed by: PHYSICIAN ASSISTANT

## 2021-05-06 PROCEDURE — 99232 PR SUBSEQUENT HOSPITAL CARE,LEVL II: ICD-10-PCS | Mod: ,,, | Performed by: NURSE PRACTITIONER

## 2021-05-06 PROCEDURE — 99233 PR SUBSEQUENT HOSPITAL CARE,LEVL III: ICD-10-PCS | Mod: GC,,, | Performed by: PODIATRIST

## 2021-05-06 PROCEDURE — 99223 1ST HOSP IP/OBS HIGH 75: CPT | Mod: ,,, | Performed by: PHYSICIAN ASSISTANT

## 2021-05-06 PROCEDURE — 80053 COMPREHEN METABOLIC PANEL: CPT | Performed by: SURGERY

## 2021-05-06 PROCEDURE — 85025 COMPLETE CBC W/AUTO DIFF WBC: CPT | Performed by: SURGERY

## 2021-05-06 RX ORDER — BISACODYL 10 MG
10 SUPPOSITORY, RECTAL RECTAL ONCE
Status: COMPLETED | OUTPATIENT
Start: 2021-05-06 | End: 2021-05-06

## 2021-05-06 RX ORDER — AMOXICILLIN 250 MG
1 CAPSULE ORAL DAILY
Status: DISCONTINUED | OUTPATIENT
Start: 2021-05-06 | End: 2021-05-24

## 2021-05-06 RX ORDER — POLYETHYLENE GLYCOL 3350 17 G/17G
17 POWDER, FOR SOLUTION ORAL DAILY
Status: DISCONTINUED | OUTPATIENT
Start: 2021-05-06 | End: 2021-05-18

## 2021-05-06 RX ADMIN — HYDRALAZINE HYDROCHLORIDE 50 MG: 50 TABLET, FILM COATED ORAL at 04:05

## 2021-05-06 RX ADMIN — ATORVASTATIN CALCIUM 80 MG: 20 TABLET, FILM COATED ORAL at 08:05

## 2021-05-06 RX ADMIN — ACETAMINOPHEN 650 MG: 325 TABLET ORAL at 05:05

## 2021-05-06 RX ADMIN — BISACODYL 10 MG: 10 SUPPOSITORY RECTAL at 10:05

## 2021-05-06 RX ADMIN — APIXABAN 10 MG: 5 TABLET, FILM COATED ORAL at 08:05

## 2021-05-06 RX ADMIN — CARVEDILOL 6.25 MG: 6.25 TABLET, FILM COATED ORAL at 08:05

## 2021-05-06 RX ADMIN — CYCLOBENZAPRINE HYDROCHLORIDE 5 MG: 5 TABLET, FILM COATED ORAL at 12:05

## 2021-05-06 RX ADMIN — DOCUSATE SODIUM 50MG AND SENNOSIDES 8.6MG 1 TABLET: 8.6; 5 TABLET, FILM COATED ORAL at 10:05

## 2021-05-06 RX ADMIN — ACETAMINOPHEN 650 MG: 325 TABLET ORAL at 12:05

## 2021-05-06 RX ADMIN — HYDRALAZINE HYDROCHLORIDE 50 MG: 50 TABLET, FILM COATED ORAL at 05:05

## 2021-05-06 RX ADMIN — ASPIRIN 325 MG ORAL TABLET 325 MG: 325 PILL ORAL at 09:05

## 2021-05-06 RX ADMIN — HYDROMORPHONE HYDROCHLORIDE 1 MG: 1 INJECTION, SOLUTION INTRAMUSCULAR; INTRAVENOUS; SUBCUTANEOUS at 07:05

## 2021-05-06 RX ADMIN — GABAPENTIN 300 MG: 300 CAPSULE ORAL at 04:05

## 2021-05-06 RX ADMIN — OXYCODONE AND ACETAMINOPHEN 1 TABLET: 10; 325 TABLET ORAL at 08:05

## 2021-05-06 RX ADMIN — OXYCODONE AND ACETAMINOPHEN 1 TABLET: 10; 325 TABLET ORAL at 12:05

## 2021-05-06 RX ADMIN — ALLOPURINOL 100 MG: 100 TABLET ORAL at 09:05

## 2021-05-06 RX ADMIN — HYDROMORPHONE HYDROCHLORIDE 1 MG: 1 INJECTION, SOLUTION INTRAMUSCULAR; INTRAVENOUS; SUBCUTANEOUS at 12:05

## 2021-05-06 RX ADMIN — GABAPENTIN 300 MG: 300 CAPSULE ORAL at 08:05

## 2021-05-07 LAB
ALBUMIN SERPL BCP-MCNC: 1.3 G/DL (ref 3.5–5.2)
ALP SERPL-CCNC: 93 U/L (ref 55–135)
ALT SERPL W/O P-5'-P-CCNC: <5 U/L (ref 10–44)
ANION GAP SERPL CALC-SCNC: 9 MMOL/L (ref 8–16)
AST SERPL-CCNC: 14 U/L (ref 10–40)
BASOPHILS # BLD AUTO: 0.05 K/UL (ref 0–0.2)
BASOPHILS NFR BLD: 0.4 % (ref 0–1.9)
BILIRUB SERPL-MCNC: 0.2 MG/DL (ref 0.1–1)
BUN SERPL-MCNC: 32 MG/DL (ref 6–20)
CALCIUM SERPL-MCNC: 8 MG/DL (ref 8.7–10.5)
CHLORIDE SERPL-SCNC: 108 MMOL/L (ref 95–110)
CO2 SERPL-SCNC: 17 MMOL/L (ref 23–29)
CREAT SERPL-MCNC: 2.1 MG/DL (ref 0.5–1.4)
DIFFERENTIAL METHOD: ABNORMAL
EOSINOPHIL # BLD AUTO: 0.3 K/UL (ref 0–0.5)
EOSINOPHIL NFR BLD: 2.7 % (ref 0–8)
ERYTHROCYTE [DISTWIDTH] IN BLOOD BY AUTOMATED COUNT: 16.9 % (ref 11.5–14.5)
EST. GFR  (AFRICAN AMERICAN): 30.1 ML/MIN/1.73 M^2
EST. GFR  (NON AFRICAN AMERICAN): 26.1 ML/MIN/1.73 M^2
FINAL PATHOLOGIC DIAGNOSIS: NORMAL
GLUCOSE SERPL-MCNC: 127 MG/DL (ref 70–110)
GROSS: NORMAL
HCT VFR BLD AUTO: 22.6 % (ref 37–48.5)
HGB BLD-MCNC: 7.5 G/DL (ref 12–16)
IMM GRANULOCYTES # BLD AUTO: 0.12 K/UL (ref 0–0.04)
IMM GRANULOCYTES NFR BLD AUTO: 1.1 % (ref 0–0.5)
LYMPHOCYTES # BLD AUTO: 1.8 K/UL (ref 1–4.8)
LYMPHOCYTES NFR BLD: 15.4 % (ref 18–48)
Lab: NORMAL
MAGNESIUM SERPL-MCNC: 2 MG/DL (ref 1.6–2.6)
MCH RBC QN AUTO: 29.9 PG (ref 27–31)
MCHC RBC AUTO-ENTMCNC: 33.2 G/DL (ref 32–36)
MCV RBC AUTO: 90 FL (ref 82–98)
MONOCYTES # BLD AUTO: 1.4 K/UL (ref 0.3–1)
MONOCYTES NFR BLD: 12.3 % (ref 4–15)
NEUTROPHILS # BLD AUTO: 7.7 K/UL (ref 1.8–7.7)
NEUTROPHILS NFR BLD: 68.1 % (ref 38–73)
NRBC BLD-RTO: 0 /100 WBC
PHOSPHATE SERPL-MCNC: 4.3 MG/DL (ref 2.7–4.5)
PLATELET # BLD AUTO: 396 K/UL (ref 150–450)
PMV BLD AUTO: 10.8 FL (ref 9.2–12.9)
POCT GLUCOSE: 133 MG/DL (ref 70–110)
POCT GLUCOSE: 164 MG/DL (ref 70–110)
POTASSIUM SERPL-SCNC: 4 MMOL/L (ref 3.5–5.1)
PROT SERPL-MCNC: 5.4 G/DL (ref 6–8.4)
RBC # BLD AUTO: 2.51 M/UL (ref 4–5.4)
SODIUM SERPL-SCNC: 134 MMOL/L (ref 136–145)
WBC # BLD AUTO: 11.34 K/UL (ref 3.9–12.7)

## 2021-05-07 PROCEDURE — 20600001 HC STEP DOWN PRIVATE ROOM

## 2021-05-07 PROCEDURE — 25000003 PHARM REV CODE 250: Performed by: STUDENT IN AN ORGANIZED HEALTH CARE EDUCATION/TRAINING PROGRAM

## 2021-05-07 PROCEDURE — 63600175 PHARM REV CODE 636 W HCPCS: Performed by: INTERNAL MEDICINE

## 2021-05-07 PROCEDURE — 99233 PR SUBSEQUENT HOSPITAL CARE,LEVL III: ICD-10-PCS | Mod: ,,, | Performed by: PHYSICIAN ASSISTANT

## 2021-05-07 PROCEDURE — 94799 UNLISTED PULMONARY SVC/PX: CPT

## 2021-05-07 PROCEDURE — 84100 ASSAY OF PHOSPHORUS: CPT | Performed by: SURGERY

## 2021-05-07 PROCEDURE — 97110 THERAPEUTIC EXERCISES: CPT

## 2021-05-07 PROCEDURE — 99232 SBSQ HOSP IP/OBS MODERATE 35: CPT | Mod: ,,, | Performed by: NURSE PRACTITIONER

## 2021-05-07 PROCEDURE — 63600175 PHARM REV CODE 636 W HCPCS: Performed by: STUDENT IN AN ORGANIZED HEALTH CARE EDUCATION/TRAINING PROGRAM

## 2021-05-07 PROCEDURE — 99024 PR POST-OP FOLLOW-UP VISIT: ICD-10-PCS | Mod: POP,,, | Performed by: SURGERY

## 2021-05-07 PROCEDURE — 97530 THERAPEUTIC ACTIVITIES: CPT

## 2021-05-07 PROCEDURE — 99233 SBSQ HOSP IP/OBS HIGH 50: CPT | Mod: GC,,, | Performed by: PODIATRIST

## 2021-05-07 PROCEDURE — 99233 PR SUBSEQUENT HOSPITAL CARE,LEVL III: ICD-10-PCS | Mod: GC,,, | Performed by: PODIATRIST

## 2021-05-07 PROCEDURE — 94761 N-INVAS EAR/PLS OXIMETRY MLT: CPT

## 2021-05-07 PROCEDURE — 83735 ASSAY OF MAGNESIUM: CPT | Performed by: SURGERY

## 2021-05-07 PROCEDURE — 97112 NEUROMUSCULAR REEDUCATION: CPT

## 2021-05-07 PROCEDURE — 99233 SBSQ HOSP IP/OBS HIGH 50: CPT | Mod: ,,, | Performed by: PHYSICIAN ASSISTANT

## 2021-05-07 PROCEDURE — 85025 COMPLETE CBC W/AUTO DIFF WBC: CPT | Performed by: SURGERY

## 2021-05-07 PROCEDURE — 99024 POSTOP FOLLOW-UP VISIT: CPT | Mod: POP,,, | Performed by: SURGERY

## 2021-05-07 PROCEDURE — 25000003 PHARM REV CODE 250: Performed by: INTERNAL MEDICINE

## 2021-05-07 PROCEDURE — 99232 PR SUBSEQUENT HOSPITAL CARE,LEVL II: ICD-10-PCS | Mod: ,,, | Performed by: NURSE PRACTITIONER

## 2021-05-07 PROCEDURE — 80053 COMPREHEN METABOLIC PANEL: CPT | Performed by: SURGERY

## 2021-05-07 RX ADMIN — HYDROMORPHONE HYDROCHLORIDE 1 MG: 1 INJECTION, SOLUTION INTRAMUSCULAR; INTRAVENOUS; SUBCUTANEOUS at 12:05

## 2021-05-07 RX ADMIN — OXYCODONE AND ACETAMINOPHEN 1 TABLET: 10; 325 TABLET ORAL at 10:05

## 2021-05-07 RX ADMIN — HYDRALAZINE HYDROCHLORIDE 50 MG: 50 TABLET, FILM COATED ORAL at 08:05

## 2021-05-07 RX ADMIN — HYDRALAZINE HYDROCHLORIDE 50 MG: 50 TABLET, FILM COATED ORAL at 12:05

## 2021-05-07 RX ADMIN — OXYCODONE AND ACETAMINOPHEN 1 TABLET: 10; 325 TABLET ORAL at 04:05

## 2021-05-07 RX ADMIN — HYDROMORPHONE HYDROCHLORIDE 1 MG: 1 INJECTION, SOLUTION INTRAMUSCULAR; INTRAVENOUS; SUBCUTANEOUS at 09:05

## 2021-05-07 RX ADMIN — APIXABAN 10 MG: 5 TABLET, FILM COATED ORAL at 08:05

## 2021-05-07 RX ADMIN — ONDANSETRON 4 MG: 4 TABLET, ORALLY DISINTEGRATING ORAL at 08:05

## 2021-05-07 RX ADMIN — HYDRALAZINE HYDROCHLORIDE 50 MG: 50 TABLET, FILM COATED ORAL at 04:05

## 2021-05-07 RX ADMIN — ACETAMINOPHEN 650 MG: 325 TABLET ORAL at 12:05

## 2021-05-07 RX ADMIN — ERAVACYCLINE 98.2 MG: 50 INJECTION, POWDER, LYOPHILIZED, FOR SOLUTION INTRAVENOUS at 04:05

## 2021-05-07 RX ADMIN — GABAPENTIN 300 MG: 300 CAPSULE ORAL at 09:05

## 2021-05-07 RX ADMIN — DOCUSATE SODIUM 50MG AND SENNOSIDES 8.6MG 1 TABLET: 8.6; 5 TABLET, FILM COATED ORAL at 09:05

## 2021-05-07 RX ADMIN — OXYCODONE AND ACETAMINOPHEN 1 TABLET: 10; 325 TABLET ORAL at 11:05

## 2021-05-07 RX ADMIN — ATORVASTATIN CALCIUM 80 MG: 20 TABLET, FILM COATED ORAL at 08:05

## 2021-05-07 RX ADMIN — ASPIRIN 325 MG ORAL TABLET 325 MG: 325 PILL ORAL at 09:05

## 2021-05-07 RX ADMIN — ACETAMINOPHEN 650 MG: 325 TABLET ORAL at 11:05

## 2021-05-07 RX ADMIN — GABAPENTIN 300 MG: 300 CAPSULE ORAL at 08:05

## 2021-05-07 RX ADMIN — ALLOPURINOL 100 MG: 100 TABLET ORAL at 09:05

## 2021-05-07 RX ADMIN — CARVEDILOL 6.25 MG: 6.25 TABLET, FILM COATED ORAL at 08:05

## 2021-05-07 RX ADMIN — APIXABAN 10 MG: 5 TABLET, FILM COATED ORAL at 09:05

## 2021-05-08 LAB
ALBUMIN SERPL BCP-MCNC: 1.3 G/DL (ref 3.5–5.2)
ALP SERPL-CCNC: 96 U/L (ref 55–135)
ALT SERPL W/O P-5'-P-CCNC: 5 U/L (ref 10–44)
ANION GAP SERPL CALC-SCNC: 9 MMOL/L (ref 8–16)
AST SERPL-CCNC: 14 U/L (ref 10–40)
BACTERIA SPEC AEROBE CULT: ABNORMAL
BASOPHILS # BLD AUTO: 0.06 K/UL (ref 0–0.2)
BASOPHILS NFR BLD: 0.5 % (ref 0–1.9)
BILIRUB SERPL-MCNC: 0.2 MG/DL (ref 0.1–1)
BUN SERPL-MCNC: 36 MG/DL (ref 6–20)
CALCIUM SERPL-MCNC: 7.7 MG/DL (ref 8.7–10.5)
CHLORIDE SERPL-SCNC: 110 MMOL/L (ref 95–110)
CO2 SERPL-SCNC: 16 MMOL/L (ref 23–29)
CREAT SERPL-MCNC: 2.1 MG/DL (ref 0.5–1.4)
DIFFERENTIAL METHOD: ABNORMAL
EOSINOPHIL # BLD AUTO: 0.3 K/UL (ref 0–0.5)
EOSINOPHIL NFR BLD: 2.5 % (ref 0–8)
ERYTHROCYTE [DISTWIDTH] IN BLOOD BY AUTOMATED COUNT: 17.1 % (ref 11.5–14.5)
EST. GFR  (AFRICAN AMERICAN): 30.1 ML/MIN/1.73 M^2
EST. GFR  (NON AFRICAN AMERICAN): 26.1 ML/MIN/1.73 M^2
GLUCOSE SERPL-MCNC: 135 MG/DL (ref 70–110)
HCT VFR BLD AUTO: 22.6 % (ref 37–48.5)
HGB BLD-MCNC: 7.4 G/DL (ref 12–16)
IMM GRANULOCYTES # BLD AUTO: 0.13 K/UL (ref 0–0.04)
IMM GRANULOCYTES NFR BLD AUTO: 1.1 % (ref 0–0.5)
LYMPHOCYTES # BLD AUTO: 1.9 K/UL (ref 1–4.8)
LYMPHOCYTES NFR BLD: 16.5 % (ref 18–48)
MAGNESIUM SERPL-MCNC: 2 MG/DL (ref 1.6–2.6)
MCH RBC QN AUTO: 29.8 PG (ref 27–31)
MCHC RBC AUTO-ENTMCNC: 32.7 G/DL (ref 32–36)
MCV RBC AUTO: 91 FL (ref 82–98)
MONOCYTES # BLD AUTO: 1.3 K/UL (ref 0.3–1)
MONOCYTES NFR BLD: 11.6 % (ref 4–15)
NEUTROPHILS # BLD AUTO: 7.8 K/UL (ref 1.8–7.7)
NEUTROPHILS NFR BLD: 67.8 % (ref 38–73)
NRBC BLD-RTO: 0 /100 WBC
PHOSPHATE SERPL-MCNC: 4.3 MG/DL (ref 2.7–4.5)
PLATELET # BLD AUTO: 463 K/UL (ref 150–450)
PMV BLD AUTO: 10.6 FL (ref 9.2–12.9)
POCT GLUCOSE: 158 MG/DL (ref 70–110)
POTASSIUM SERPL-SCNC: 3.8 MMOL/L (ref 3.5–5.1)
PROT SERPL-MCNC: 5.3 G/DL (ref 6–8.4)
RBC # BLD AUTO: 2.48 M/UL (ref 4–5.4)
SODIUM SERPL-SCNC: 135 MMOL/L (ref 136–145)
WBC # BLD AUTO: 11.5 K/UL (ref 3.9–12.7)

## 2021-05-08 PROCEDURE — 25000003 PHARM REV CODE 250: Performed by: STUDENT IN AN ORGANIZED HEALTH CARE EDUCATION/TRAINING PROGRAM

## 2021-05-08 PROCEDURE — 20600001 HC STEP DOWN PRIVATE ROOM

## 2021-05-08 PROCEDURE — 85025 COMPLETE CBC W/AUTO DIFF WBC: CPT | Performed by: SURGERY

## 2021-05-08 PROCEDURE — 84100 ASSAY OF PHOSPHORUS: CPT | Performed by: SURGERY

## 2021-05-08 PROCEDURE — 63600175 PHARM REV CODE 636 W HCPCS: Performed by: STUDENT IN AN ORGANIZED HEALTH CARE EDUCATION/TRAINING PROGRAM

## 2021-05-08 PROCEDURE — 80053 COMPREHEN METABOLIC PANEL: CPT | Performed by: SURGERY

## 2021-05-08 PROCEDURE — 63600175 PHARM REV CODE 636 W HCPCS: Performed by: INTERNAL MEDICINE

## 2021-05-08 PROCEDURE — 83735 ASSAY OF MAGNESIUM: CPT | Performed by: SURGERY

## 2021-05-08 PROCEDURE — 36415 COLL VENOUS BLD VENIPUNCTURE: CPT | Performed by: SURGERY

## 2021-05-08 PROCEDURE — 94761 N-INVAS EAR/PLS OXIMETRY MLT: CPT

## 2021-05-08 PROCEDURE — 99900035 HC TECH TIME PER 15 MIN (STAT)

## 2021-05-08 PROCEDURE — 25000003 PHARM REV CODE 250: Performed by: INTERNAL MEDICINE

## 2021-05-08 RX ADMIN — HYDROMORPHONE HYDROCHLORIDE 0.5 MG: 1 INJECTION, SOLUTION INTRAMUSCULAR; INTRAVENOUS; SUBCUTANEOUS at 01:05

## 2021-05-08 RX ADMIN — METHOCARBAMOL 500 MG: 500 TABLET ORAL at 08:05

## 2021-05-08 RX ADMIN — GABAPENTIN 300 MG: 300 CAPSULE ORAL at 08:05

## 2021-05-08 RX ADMIN — ACETAMINOPHEN 650 MG: 325 TABLET ORAL at 06:05

## 2021-05-08 RX ADMIN — APIXABAN 10 MG: 5 TABLET, FILM COATED ORAL at 08:05

## 2021-05-08 RX ADMIN — HYDRALAZINE HYDROCHLORIDE 50 MG: 50 TABLET, FILM COATED ORAL at 08:05

## 2021-05-08 RX ADMIN — ATORVASTATIN CALCIUM 80 MG: 20 TABLET, FILM COATED ORAL at 08:05

## 2021-05-08 RX ADMIN — CARVEDILOL 6.25 MG: 6.25 TABLET, FILM COATED ORAL at 08:05

## 2021-05-08 RX ADMIN — ALLOPURINOL 100 MG: 100 TABLET ORAL at 08:05

## 2021-05-08 RX ADMIN — OXYCODONE AND ACETAMINOPHEN 1 TABLET: 10; 325 TABLET ORAL at 03:05

## 2021-05-08 RX ADMIN — HYDROMORPHONE HYDROCHLORIDE 0.5 MG: 1 INJECTION, SOLUTION INTRAMUSCULAR; INTRAVENOUS; SUBCUTANEOUS at 06:05

## 2021-05-08 RX ADMIN — DOCUSATE SODIUM 50MG AND SENNOSIDES 8.6MG 1 TABLET: 8.6; 5 TABLET, FILM COATED ORAL at 08:05

## 2021-05-08 RX ADMIN — HYDRALAZINE HYDROCHLORIDE 50 MG: 50 TABLET, FILM COATED ORAL at 06:05

## 2021-05-08 RX ADMIN — ASPIRIN 325 MG ORAL TABLET 325 MG: 325 PILL ORAL at 08:05

## 2021-05-08 RX ADMIN — OXYCODONE AND ACETAMINOPHEN 1 TABLET: 10; 325 TABLET ORAL at 11:05

## 2021-05-08 RX ADMIN — ACETAMINOPHEN 650 MG: 325 TABLET ORAL at 07:05

## 2021-05-08 RX ADMIN — ERAVACYCLINE 98.2 MG: 50 INJECTION, POWDER, LYOPHILIZED, FOR SOLUTION INTRAVENOUS at 03:05

## 2021-05-08 RX ADMIN — OXYCODONE AND ACETAMINOPHEN 1 TABLET: 10; 325 TABLET ORAL at 07:05

## 2021-05-09 LAB
ALBUMIN SERPL BCP-MCNC: 1.3 G/DL (ref 3.5–5.2)
ALP SERPL-CCNC: 110 U/L (ref 55–135)
ALT SERPL W/O P-5'-P-CCNC: 5 U/L (ref 10–44)
ANION GAP SERPL CALC-SCNC: 10 MMOL/L (ref 8–16)
AST SERPL-CCNC: 11 U/L (ref 10–40)
BASOPHILS # BLD AUTO: 0.07 K/UL (ref 0–0.2)
BASOPHILS NFR BLD: 0.5 % (ref 0–1.9)
BILIRUB SERPL-MCNC: 0.2 MG/DL (ref 0.1–1)
BUN SERPL-MCNC: 41 MG/DL (ref 6–20)
CALCIUM SERPL-MCNC: 8 MG/DL (ref 8.7–10.5)
CHLORIDE SERPL-SCNC: 109 MMOL/L (ref 95–110)
CO2 SERPL-SCNC: 16 MMOL/L (ref 23–29)
CREAT SERPL-MCNC: 1.8 MG/DL (ref 0.5–1.4)
DIFFERENTIAL METHOD: ABNORMAL
EOSINOPHIL # BLD AUTO: 0.4 K/UL (ref 0–0.5)
EOSINOPHIL NFR BLD: 2.9 % (ref 0–8)
ERYTHROCYTE [DISTWIDTH] IN BLOOD BY AUTOMATED COUNT: 16.8 % (ref 11.5–14.5)
EST. GFR  (AFRICAN AMERICAN): 36.3 ML/MIN/1.73 M^2
EST. GFR  (NON AFRICAN AMERICAN): 31.5 ML/MIN/1.73 M^2
GLUCOSE SERPL-MCNC: 133 MG/DL (ref 70–110)
HCT VFR BLD AUTO: 27 % (ref 37–48.5)
HGB BLD-MCNC: 8.3 G/DL (ref 12–16)
IMM GRANULOCYTES # BLD AUTO: 0.1 K/UL (ref 0–0.04)
IMM GRANULOCYTES NFR BLD AUTO: 0.8 % (ref 0–0.5)
LYMPHOCYTES # BLD AUTO: 1.6 K/UL (ref 1–4.8)
LYMPHOCYTES NFR BLD: 12.8 % (ref 18–48)
MAGNESIUM SERPL-MCNC: 1.9 MG/DL (ref 1.6–2.6)
MCH RBC QN AUTO: 29.9 PG (ref 27–31)
MCHC RBC AUTO-ENTMCNC: 30.7 G/DL (ref 32–36)
MCV RBC AUTO: 97 FL (ref 82–98)
MONOCYTES # BLD AUTO: 1.3 K/UL (ref 0.3–1)
MONOCYTES NFR BLD: 10.1 % (ref 4–15)
NEUTROPHILS # BLD AUTO: 9.3 K/UL (ref 1.8–7.7)
NEUTROPHILS NFR BLD: 72.9 % (ref 38–73)
NRBC BLD-RTO: 0 /100 WBC
PHOSPHATE SERPL-MCNC: 4.6 MG/DL (ref 2.7–4.5)
PLATELET # BLD AUTO: 593 K/UL (ref 150–450)
PMV BLD AUTO: 9.9 FL (ref 9.2–12.9)
POCT GLUCOSE: 142 MG/DL (ref 70–110)
POCT GLUCOSE: 147 MG/DL (ref 70–110)
POCT GLUCOSE: 149 MG/DL (ref 70–110)
POCT GLUCOSE: 150 MG/DL (ref 70–110)
POTASSIUM SERPL-SCNC: 3.8 MMOL/L (ref 3.5–5.1)
PROT SERPL-MCNC: 5.6 G/DL (ref 6–8.4)
RBC # BLD AUTO: 2.78 M/UL (ref 4–5.4)
SODIUM SERPL-SCNC: 135 MMOL/L (ref 136–145)
WBC # BLD AUTO: 12.78 K/UL (ref 3.9–12.7)

## 2021-05-09 PROCEDURE — 25000003 PHARM REV CODE 250: Performed by: STUDENT IN AN ORGANIZED HEALTH CARE EDUCATION/TRAINING PROGRAM

## 2021-05-09 PROCEDURE — 63600175 PHARM REV CODE 636 W HCPCS: Performed by: INTERNAL MEDICINE

## 2021-05-09 PROCEDURE — 80053 COMPREHEN METABOLIC PANEL: CPT | Performed by: SURGERY

## 2021-05-09 PROCEDURE — 20600001 HC STEP DOWN PRIVATE ROOM

## 2021-05-09 PROCEDURE — 85025 COMPLETE CBC W/AUTO DIFF WBC: CPT | Performed by: SURGERY

## 2021-05-09 PROCEDURE — 36415 COLL VENOUS BLD VENIPUNCTURE: CPT | Performed by: SURGERY

## 2021-05-09 PROCEDURE — 63600175 PHARM REV CODE 636 W HCPCS: Performed by: STUDENT IN AN ORGANIZED HEALTH CARE EDUCATION/TRAINING PROGRAM

## 2021-05-09 PROCEDURE — 25000003 PHARM REV CODE 250: Performed by: INTERNAL MEDICINE

## 2021-05-09 PROCEDURE — 83735 ASSAY OF MAGNESIUM: CPT | Performed by: SURGERY

## 2021-05-09 PROCEDURE — 84100 ASSAY OF PHOSPHORUS: CPT | Performed by: SURGERY

## 2021-05-09 RX ORDER — GABAPENTIN 300 MG/1
600 CAPSULE ORAL 3 TIMES DAILY
Status: DISCONTINUED | OUTPATIENT
Start: 2021-05-09 | End: 2021-05-18

## 2021-05-09 RX ORDER — CYCLOBENZAPRINE HCL 5 MG
5 TABLET ORAL 3 TIMES DAILY
Status: DISCONTINUED | OUTPATIENT
Start: 2021-05-09 | End: 2021-05-21

## 2021-05-09 RX ADMIN — OXYCODONE AND ACETAMINOPHEN 1 TABLET: 10; 325 TABLET ORAL at 07:05

## 2021-05-09 RX ADMIN — GABAPENTIN 300 MG: 300 CAPSULE ORAL at 09:05

## 2021-05-09 RX ADMIN — DOCUSATE SODIUM 50MG AND SENNOSIDES 8.6MG 1 TABLET: 8.6; 5 TABLET, FILM COATED ORAL at 09:05

## 2021-05-09 RX ADMIN — ATORVASTATIN CALCIUM 80 MG: 20 TABLET, FILM COATED ORAL at 08:05

## 2021-05-09 RX ADMIN — OXYCODONE AND ACETAMINOPHEN 1 TABLET: 10; 325 TABLET ORAL at 12:05

## 2021-05-09 RX ADMIN — CARVEDILOL 6.25 MG: 6.25 TABLET, FILM COATED ORAL at 08:05

## 2021-05-09 RX ADMIN — HYDRALAZINE HYDROCHLORIDE 50 MG: 50 TABLET, FILM COATED ORAL at 10:05

## 2021-05-09 RX ADMIN — ACETAMINOPHEN 650 MG: 325 TABLET ORAL at 05:05

## 2021-05-09 RX ADMIN — ERAVACYCLINE 98.2 MG: 50 INJECTION, POWDER, LYOPHILIZED, FOR SOLUTION INTRAVENOUS at 02:05

## 2021-05-09 RX ADMIN — HYDRALAZINE HYDROCHLORIDE 50 MG: 50 TABLET, FILM COATED ORAL at 01:05

## 2021-05-09 RX ADMIN — ACETAMINOPHEN 650 MG: 325 TABLET ORAL at 12:05

## 2021-05-09 RX ADMIN — GABAPENTIN 600 MG: 300 CAPSULE ORAL at 08:05

## 2021-05-09 RX ADMIN — HYDROMORPHONE HYDROCHLORIDE 0.5 MG: 1 INJECTION, SOLUTION INTRAMUSCULAR; INTRAVENOUS; SUBCUTANEOUS at 06:05

## 2021-05-09 RX ADMIN — CYCLOBENZAPRINE HYDROCHLORIDE 5 MG: 5 TABLET, FILM COATED ORAL at 11:05

## 2021-05-09 RX ADMIN — ASPIRIN 325 MG ORAL TABLET 325 MG: 325 PILL ORAL at 09:05

## 2021-05-09 RX ADMIN — POLYETHYLENE GLYCOL 3350 17 G: 17 POWDER, FOR SOLUTION ORAL at 09:05

## 2021-05-09 RX ADMIN — APIXABAN 10 MG: 5 TABLET, FILM COATED ORAL at 08:05

## 2021-05-09 RX ADMIN — SODIUM CHLORIDE 5 ML/HR: 0.9 INJECTION, SOLUTION INTRAVENOUS at 03:05

## 2021-05-09 RX ADMIN — HYDROMORPHONE HYDROCHLORIDE 0.5 MG: 1 INJECTION, SOLUTION INTRAMUSCULAR; INTRAVENOUS; SUBCUTANEOUS at 09:05

## 2021-05-09 RX ADMIN — APIXABAN 10 MG: 5 TABLET, FILM COATED ORAL at 09:05

## 2021-05-09 RX ADMIN — GABAPENTIN 600 MG: 300 CAPSULE ORAL at 03:05

## 2021-05-09 RX ADMIN — HYDRALAZINE HYDROCHLORIDE 50 MG: 50 TABLET, FILM COATED ORAL at 05:05

## 2021-05-09 RX ADMIN — ALLOPURINOL 100 MG: 100 TABLET ORAL at 09:05

## 2021-05-09 RX ADMIN — CYCLOBENZAPRINE HYDROCHLORIDE 5 MG: 5 TABLET, FILM COATED ORAL at 07:05

## 2021-05-09 RX ADMIN — ERAVACYCLINE 98.2 MG: 50 INJECTION, POWDER, LYOPHILIZED, FOR SOLUTION INTRAVENOUS at 03:05

## 2021-05-09 RX ADMIN — CYCLOBENZAPRINE HYDROCHLORIDE 5 MG: 5 TABLET, FILM COATED ORAL at 08:05

## 2021-05-09 RX ADMIN — HYDROMORPHONE HYDROCHLORIDE 0.5 MG: 1 INJECTION, SOLUTION INTRAMUSCULAR; INTRAVENOUS; SUBCUTANEOUS at 03:05

## 2021-05-09 RX ADMIN — ACETAMINOPHEN 650 MG: 325 TABLET ORAL at 11:05

## 2021-05-10 LAB
ALBUMIN SERPL BCP-MCNC: 1.4 G/DL (ref 3.5–5.2)
ALP SERPL-CCNC: 133 U/L (ref 55–135)
ALT SERPL W/O P-5'-P-CCNC: 7 U/L (ref 10–44)
AMORPH CRY UR QL COMP ASSIST: ABNORMAL
ANION GAP SERPL CALC-SCNC: 9 MMOL/L (ref 8–16)
AST SERPL-CCNC: 13 U/L (ref 10–40)
BACTERIA #/AREA URNS AUTO: ABNORMAL /HPF
BACTERIA SPEC ANAEROBE CULT: NORMAL
BASOPHILS # BLD AUTO: 0.1 K/UL (ref 0–0.2)
BASOPHILS NFR BLD: 0.6 % (ref 0–1.9)
BILIRUB SERPL-MCNC: 0.3 MG/DL (ref 0.1–1)
BILIRUB UR QL STRIP: NEGATIVE
BUN SERPL-MCNC: 50 MG/DL (ref 6–20)
CALCIUM SERPL-MCNC: 8.6 MG/DL (ref 8.7–10.5)
CHLORIDE SERPL-SCNC: 109 MMOL/L (ref 95–110)
CLARITY UR REFRACT.AUTO: ABNORMAL
CO2 SERPL-SCNC: 17 MMOL/L (ref 23–29)
COLOR UR AUTO: YELLOW
CREAT SERPL-MCNC: 1.9 MG/DL (ref 0.5–1.4)
DIFFERENTIAL METHOD: ABNORMAL
EOSINOPHIL # BLD AUTO: 0.3 K/UL (ref 0–0.5)
EOSINOPHIL NFR BLD: 1.6 % (ref 0–8)
ERYTHROCYTE [DISTWIDTH] IN BLOOD BY AUTOMATED COUNT: 17.1 % (ref 11.5–14.5)
EST. GFR  (AFRICAN AMERICAN): 34 ML/MIN/1.73 M^2
EST. GFR  (NON AFRICAN AMERICAN): 29.5 ML/MIN/1.73 M^2
GLUCOSE SERPL-MCNC: 136 MG/DL (ref 70–110)
GLUCOSE UR QL STRIP: NEGATIVE
HCT VFR BLD AUTO: 29.6 % (ref 37–48.5)
HGB BLD-MCNC: 9 G/DL (ref 12–16)
HGB UR QL STRIP: NEGATIVE
HYALINE CASTS UR QL AUTO: 8 /LPF
IMM GRANULOCYTES # BLD AUTO: 0.15 K/UL (ref 0–0.04)
IMM GRANULOCYTES NFR BLD AUTO: 0.9 % (ref 0–0.5)
KETONES UR QL STRIP: NEGATIVE
LEUKOCYTE ESTERASE UR QL STRIP: NEGATIVE
LYMPHOCYTES # BLD AUTO: 1.9 K/UL (ref 1–4.8)
LYMPHOCYTES NFR BLD: 11.8 % (ref 18–48)
MAGNESIUM SERPL-MCNC: 2 MG/DL (ref 1.6–2.6)
MCH RBC QN AUTO: 29.6 PG (ref 27–31)
MCHC RBC AUTO-ENTMCNC: 30.4 G/DL (ref 32–36)
MCV RBC AUTO: 97 FL (ref 82–98)
MICROSCOPIC COMMENT: ABNORMAL
MONOCYTES # BLD AUTO: 1.3 K/UL (ref 0.3–1)
MONOCYTES NFR BLD: 7.8 % (ref 4–15)
NEUTROPHILS # BLD AUTO: 12.7 K/UL (ref 1.8–7.7)
NEUTROPHILS NFR BLD: 77.3 % (ref 38–73)
NITRITE UR QL STRIP: NEGATIVE
NRBC BLD-RTO: 0 /100 WBC
PH UR STRIP: 5 [PH] (ref 5–8)
PHOSPHATE SERPL-MCNC: 4.7 MG/DL (ref 2.7–4.5)
PLATELET # BLD AUTO: 753 K/UL (ref 150–450)
PMV BLD AUTO: 9.8 FL (ref 9.2–12.9)
POCT GLUCOSE: 140 MG/DL (ref 70–110)
POCT GLUCOSE: 227 MG/DL (ref 70–110)
POTASSIUM SERPL-SCNC: 4.1 MMOL/L (ref 3.5–5.1)
PROT SERPL-MCNC: 6.3 G/DL (ref 6–8.4)
PROT UR QL STRIP: ABNORMAL
RBC # BLD AUTO: 3.04 M/UL (ref 4–5.4)
RBC #/AREA URNS AUTO: 0 /HPF (ref 0–4)
SODIUM SERPL-SCNC: 135 MMOL/L (ref 136–145)
SP GR UR STRIP: 1.01 (ref 1–1.03)
SQUAMOUS #/AREA URNS AUTO: 0 /HPF
URN SPEC COLLECT METH UR: ABNORMAL
WBC # BLD AUTO: 16.37 K/UL (ref 3.9–12.7)
WBC #/AREA URNS AUTO: 3 /HPF (ref 0–5)
YEAST UR QL AUTO: ABNORMAL

## 2021-05-10 PROCEDURE — 25000003 PHARM REV CODE 250: Performed by: INTERNAL MEDICINE

## 2021-05-10 PROCEDURE — 80053 COMPREHEN METABOLIC PANEL: CPT | Performed by: SURGERY

## 2021-05-10 PROCEDURE — 25000003 PHARM REV CODE 250: Performed by: STUDENT IN AN ORGANIZED HEALTH CARE EDUCATION/TRAINING PROGRAM

## 2021-05-10 PROCEDURE — 63600175 PHARM REV CODE 636 W HCPCS: Performed by: STUDENT IN AN ORGANIZED HEALTH CARE EDUCATION/TRAINING PROGRAM

## 2021-05-10 PROCEDURE — 81001 URINALYSIS AUTO W/SCOPE: CPT | Performed by: STUDENT IN AN ORGANIZED HEALTH CARE EDUCATION/TRAINING PROGRAM

## 2021-05-10 PROCEDURE — 83735 ASSAY OF MAGNESIUM: CPT | Performed by: SURGERY

## 2021-05-10 PROCEDURE — 84100 ASSAY OF PHOSPHORUS: CPT | Performed by: SURGERY

## 2021-05-10 PROCEDURE — 97530 THERAPEUTIC ACTIVITIES: CPT

## 2021-05-10 PROCEDURE — 85025 COMPLETE CBC W/AUTO DIFF WBC: CPT | Performed by: SURGERY

## 2021-05-10 PROCEDURE — 20600001 HC STEP DOWN PRIVATE ROOM

## 2021-05-10 PROCEDURE — 63600175 PHARM REV CODE 636 W HCPCS: Performed by: INTERNAL MEDICINE

## 2021-05-10 RX ORDER — FENTANYL CITRATE 50 UG/ML
INJECTION, SOLUTION INTRAMUSCULAR; INTRAVENOUS CODE/TRAUMA/SEDATION MEDICATION
Status: COMPLETED | OUTPATIENT
Start: 2021-05-10 | End: 2021-05-10

## 2021-05-10 RX ORDER — MIDAZOLAM HYDROCHLORIDE 1 MG/ML
INJECTION INTRAMUSCULAR; INTRAVENOUS CODE/TRAUMA/SEDATION MEDICATION
Status: COMPLETED | OUTPATIENT
Start: 2021-05-10 | End: 2021-05-10

## 2021-05-10 RX ORDER — CEFAZOLIN SODIUM 1 G/50ML
SOLUTION INTRAVENOUS
Status: COMPLETED | OUTPATIENT
Start: 2021-05-10 | End: 2021-05-10

## 2021-05-10 RX ORDER — LIDOCAINE HYDROCHLORIDE 10 MG/ML
INJECTION INFILTRATION; PERINEURAL CODE/TRAUMA/SEDATION MEDICATION
Status: COMPLETED | OUTPATIENT
Start: 2021-05-10 | End: 2021-05-10

## 2021-05-10 RX ADMIN — ERAVACYCLINE 98.2 MG: 50 INJECTION, POWDER, LYOPHILIZED, FOR SOLUTION INTRAVENOUS at 01:05

## 2021-05-10 RX ADMIN — INSULIN ASPART 1 UNITS: 100 INJECTION, SOLUTION INTRAVENOUS; SUBCUTANEOUS at 09:05

## 2021-05-10 RX ADMIN — CYCLOBENZAPRINE HYDROCHLORIDE 5 MG: 5 TABLET, FILM COATED ORAL at 09:05

## 2021-05-10 RX ADMIN — FENTANYL CITRATE 25 MCG: 50 INJECTION, SOLUTION INTRAMUSCULAR; INTRAVENOUS at 02:05

## 2021-05-10 RX ADMIN — ASPIRIN 325 MG ORAL TABLET 325 MG: 325 PILL ORAL at 09:05

## 2021-05-10 RX ADMIN — OXYCODONE AND ACETAMINOPHEN 1 TABLET: 10; 325 TABLET ORAL at 04:05

## 2021-05-10 RX ADMIN — ERAVACYCLINE 98.2 MG: 50 INJECTION, POWDER, LYOPHILIZED, FOR SOLUTION INTRAVENOUS at 02:05

## 2021-05-10 RX ADMIN — ACETAMINOPHEN 650 MG: 325 TABLET ORAL at 05:05

## 2021-05-10 RX ADMIN — ATORVASTATIN CALCIUM 80 MG: 20 TABLET, FILM COATED ORAL at 09:05

## 2021-05-10 RX ADMIN — GABAPENTIN 600 MG: 300 CAPSULE ORAL at 09:05

## 2021-05-10 RX ADMIN — MIDAZOLAM HYDROCHLORIDE 0.5 MG: 1 INJECTION INTRAMUSCULAR; INTRAVENOUS at 02:05

## 2021-05-10 RX ADMIN — ALLOPURINOL 100 MG: 100 TABLET ORAL at 09:05

## 2021-05-10 RX ADMIN — CARVEDILOL 6.25 MG: 6.25 TABLET, FILM COATED ORAL at 09:05

## 2021-05-10 RX ADMIN — DOCUSATE SODIUM 50MG AND SENNOSIDES 8.6MG 1 TABLET: 8.6; 5 TABLET, FILM COATED ORAL at 09:05

## 2021-05-10 RX ADMIN — CEFAZOLIN SODIUM 1 G: 1 SOLUTION INTRAVENOUS at 02:05

## 2021-05-10 RX ADMIN — ACETAMINOPHEN 650 MG: 325 TABLET ORAL at 12:05

## 2021-05-10 RX ADMIN — OXYCODONE AND ACETAMINOPHEN 1 TABLET: 10; 325 TABLET ORAL at 05:05

## 2021-05-10 RX ADMIN — OXYCODONE AND ACETAMINOPHEN 1 TABLET: 10; 325 TABLET ORAL at 12:05

## 2021-05-10 RX ADMIN — SODIUM CHLORIDE 5 ML/HR: 0.9 INJECTION, SOLUTION INTRAVENOUS at 05:05

## 2021-05-10 RX ADMIN — LIDOCAINE HYDROCHLORIDE 5 ML: 10 INJECTION, SOLUTION INFILTRATION; PERINEURAL at 02:05

## 2021-05-10 RX ADMIN — HYDRALAZINE HYDROCHLORIDE 50 MG: 50 TABLET, FILM COATED ORAL at 05:05

## 2021-05-10 RX ADMIN — HYDRALAZINE HYDROCHLORIDE 50 MG: 50 TABLET, FILM COATED ORAL at 09:05

## 2021-05-10 RX ADMIN — APIXABAN 10 MG: 5 TABLET, FILM COATED ORAL at 09:05

## 2021-05-10 RX ADMIN — METHOCARBAMOL 500 MG: 500 TABLET ORAL at 05:05

## 2021-05-11 PROBLEM — R80.9 PROTEINURIA: Status: ACTIVE | Noted: 2021-05-11

## 2021-05-11 LAB
ALBUMIN SERPL BCP-MCNC: 1.3 G/DL (ref 3.5–5.2)
ALP SERPL-CCNC: 123 U/L (ref 55–135)
ALT SERPL W/O P-5'-P-CCNC: 6 U/L (ref 10–44)
ANION GAP SERPL CALC-SCNC: 6 MMOL/L (ref 8–16)
AST SERPL-CCNC: 15 U/L (ref 10–40)
BASOPHILS # BLD AUTO: 0.09 K/UL (ref 0–0.2)
BASOPHILS NFR BLD: 0.6 % (ref 0–1.9)
BILIRUB SERPL-MCNC: 0.3 MG/DL (ref 0.1–1)
BUN SERPL-MCNC: 59 MG/DL (ref 6–20)
CALCIUM SERPL-MCNC: 7.9 MG/DL (ref 8.7–10.5)
CHLORIDE SERPL-SCNC: 107 MMOL/L (ref 95–110)
CO2 SERPL-SCNC: 19 MMOL/L (ref 23–29)
CREAT SERPL-MCNC: 2.1 MG/DL (ref 0.5–1.4)
CREAT UR-MCNC: 82 MG/DL (ref 15–325)
DIFFERENTIAL METHOD: ABNORMAL
EOSINOPHIL # BLD AUTO: 0.3 K/UL (ref 0–0.5)
EOSINOPHIL NFR BLD: 1.7 % (ref 0–8)
ERYTHROCYTE [DISTWIDTH] IN BLOOD BY AUTOMATED COUNT: 17 % (ref 11.5–14.5)
EST. GFR  (AFRICAN AMERICAN): 30.1 ML/MIN/1.73 M^2
EST. GFR  (NON AFRICAN AMERICAN): 26.1 ML/MIN/1.73 M^2
GLUCOSE SERPL-MCNC: 144 MG/DL (ref 70–110)
HCT VFR BLD AUTO: 25 % (ref 37–48.5)
HGB BLD-MCNC: 7.9 G/DL (ref 12–16)
IMM GRANULOCYTES # BLD AUTO: 0.07 K/UL (ref 0–0.04)
IMM GRANULOCYTES NFR BLD AUTO: 0.5 % (ref 0–0.5)
LYMPHOCYTES # BLD AUTO: 1.7 K/UL (ref 1–4.8)
LYMPHOCYTES NFR BLD: 11.1 % (ref 18–48)
MAGNESIUM SERPL-MCNC: 2.1 MG/DL (ref 1.6–2.6)
MCH RBC QN AUTO: 29.6 PG (ref 27–31)
MCHC RBC AUTO-ENTMCNC: 31.6 G/DL (ref 32–36)
MCV RBC AUTO: 94 FL (ref 82–98)
MONOCYTES # BLD AUTO: 1.5 K/UL (ref 0.3–1)
MONOCYTES NFR BLD: 9.7 % (ref 4–15)
NEUTROPHILS # BLD AUTO: 11.5 K/UL (ref 1.8–7.7)
NEUTROPHILS NFR BLD: 76.4 % (ref 38–73)
NRBC BLD-RTO: 0 /100 WBC
PHOSPHATE SERPL-MCNC: 5.3 MG/DL (ref 2.7–4.5)
PLATELET # BLD AUTO: 746 K/UL (ref 150–450)
PMV BLD AUTO: 9.5 FL (ref 9.2–12.9)
POCT GLUCOSE: 129 MG/DL (ref 70–110)
POCT GLUCOSE: 135 MG/DL (ref 70–110)
POCT GLUCOSE: 151 MG/DL (ref 70–110)
POCT GLUCOSE: 157 MG/DL (ref 70–110)
POTASSIUM SERPL-SCNC: 4.3 MMOL/L (ref 3.5–5.1)
PREALB SERPL-MCNC: 5 MG/DL (ref 20–43)
PROT SERPL-MCNC: 5.7 G/DL (ref 6–8.4)
PROT UR-MCNC: 334 MG/DL (ref 0–15)
PROT/CREAT UR: 4.07 MG/G{CREAT} (ref 0–0.2)
RBC # BLD AUTO: 2.67 M/UL (ref 4–5.4)
SODIUM SERPL-SCNC: 132 MMOL/L (ref 136–145)
WBC # BLD AUTO: 14.99 K/UL (ref 3.9–12.7)

## 2021-05-11 PROCEDURE — 83880 ASSAY OF NATRIURETIC PEPTIDE: CPT | Performed by: SURGERY

## 2021-05-11 PROCEDURE — 84100 ASSAY OF PHOSPHORUS: CPT | Performed by: SURGERY

## 2021-05-11 PROCEDURE — 63600175 PHARM REV CODE 636 W HCPCS: Performed by: INTERNAL MEDICINE

## 2021-05-11 PROCEDURE — 25000003 PHARM REV CODE 250: Performed by: STUDENT IN AN ORGANIZED HEALTH CARE EDUCATION/TRAINING PROGRAM

## 2021-05-11 PROCEDURE — 99232 PR SUBSEQUENT HOSPITAL CARE,LEVL II: ICD-10-PCS | Mod: ,,, | Performed by: NURSE PRACTITIONER

## 2021-05-11 PROCEDURE — 84134 ASSAY OF PREALBUMIN: CPT | Performed by: SURGERY

## 2021-05-11 PROCEDURE — 80053 COMPREHEN METABOLIC PANEL: CPT | Performed by: SURGERY

## 2021-05-11 PROCEDURE — 63600175 PHARM REV CODE 636 W HCPCS: Performed by: STUDENT IN AN ORGANIZED HEALTH CARE EDUCATION/TRAINING PROGRAM

## 2021-05-11 PROCEDURE — 20600001 HC STEP DOWN PRIVATE ROOM

## 2021-05-11 PROCEDURE — 85025 COMPLETE CBC W/AUTO DIFF WBC: CPT | Performed by: SURGERY

## 2021-05-11 PROCEDURE — 99223 PR INITIAL HOSPITAL CARE,LEVL III: ICD-10-PCS | Mod: ,,, | Performed by: INTERNAL MEDICINE

## 2021-05-11 PROCEDURE — 84156 ASSAY OF PROTEIN URINE: CPT | Performed by: STUDENT IN AN ORGANIZED HEALTH CARE EDUCATION/TRAINING PROGRAM

## 2021-05-11 PROCEDURE — 99223 1ST HOSP IP/OBS HIGH 75: CPT | Mod: ,,, | Performed by: INTERNAL MEDICINE

## 2021-05-11 PROCEDURE — 83735 ASSAY OF MAGNESIUM: CPT | Performed by: SURGERY

## 2021-05-11 PROCEDURE — 99232 SBSQ HOSP IP/OBS MODERATE 35: CPT | Mod: ,,, | Performed by: NURSE PRACTITIONER

## 2021-05-11 PROCEDURE — 25000003 PHARM REV CODE 250: Performed by: INTERNAL MEDICINE

## 2021-05-11 RX ORDER — NYSTATIN 100000 [USP'U]/G
POWDER TOPICAL 2 TIMES DAILY
Status: DISCONTINUED | OUTPATIENT
Start: 2021-05-11 | End: 2021-06-08 | Stop reason: HOSPADM

## 2021-05-11 RX ADMIN — GABAPENTIN 600 MG: 300 CAPSULE ORAL at 09:05

## 2021-05-11 RX ADMIN — HYDRALAZINE HYDROCHLORIDE 50 MG: 50 TABLET, FILM COATED ORAL at 09:05

## 2021-05-11 RX ADMIN — ERAVACYCLINE 98.2 MG: 50 INJECTION, POWDER, LYOPHILIZED, FOR SOLUTION INTRAVENOUS at 03:05

## 2021-05-11 RX ADMIN — POLYETHYLENE GLYCOL 3350 17 G: 17 POWDER, FOR SOLUTION ORAL at 09:05

## 2021-05-11 RX ADMIN — NYSTATIN: 100000 POWDER TOPICAL at 11:05

## 2021-05-11 RX ADMIN — ALLOPURINOL 100 MG: 100 TABLET ORAL at 09:05

## 2021-05-11 RX ADMIN — DOCUSATE SODIUM 50MG AND SENNOSIDES 8.6MG 1 TABLET: 8.6; 5 TABLET, FILM COATED ORAL at 09:05

## 2021-05-11 RX ADMIN — ASPIRIN 325 MG ORAL TABLET 325 MG: 325 PILL ORAL at 09:05

## 2021-05-11 RX ADMIN — CARVEDILOL 6.25 MG: 6.25 TABLET, FILM COATED ORAL at 09:05

## 2021-05-11 RX ADMIN — INSULIN ASPART 0 UNITS: 100 INJECTION, SOLUTION INTRAVENOUS; SUBCUTANEOUS at 10:05

## 2021-05-11 RX ADMIN — CYCLOBENZAPRINE HYDROCHLORIDE 5 MG: 5 TABLET, FILM COATED ORAL at 09:05

## 2021-05-11 RX ADMIN — HYDROMORPHONE HYDROCHLORIDE 1 MG: 1 INJECTION, SOLUTION INTRAMUSCULAR; INTRAVENOUS; SUBCUTANEOUS at 12:05

## 2021-05-11 RX ADMIN — APIXABAN 10 MG: 5 TABLET, FILM COATED ORAL at 09:05

## 2021-05-11 RX ADMIN — APIXABAN 5 MG: 5 TABLET, FILM COATED ORAL at 09:05

## 2021-05-11 RX ADMIN — CYCLOBENZAPRINE HYDROCHLORIDE 5 MG: 5 TABLET, FILM COATED ORAL at 03:05

## 2021-05-11 RX ADMIN — OXYCODONE AND ACETAMINOPHEN 1 TABLET: 10; 325 TABLET ORAL at 09:05

## 2021-05-11 RX ADMIN — ACETAMINOPHEN 650 MG: 325 TABLET ORAL at 05:05

## 2021-05-11 RX ADMIN — ATORVASTATIN CALCIUM 80 MG: 20 TABLET, FILM COATED ORAL at 09:05

## 2021-05-11 RX ADMIN — OXYCODONE AND ACETAMINOPHEN 1 TABLET: 10; 325 TABLET ORAL at 03:05

## 2021-05-11 RX ADMIN — HYDRALAZINE HYDROCHLORIDE 50 MG: 50 TABLET, FILM COATED ORAL at 05:05

## 2021-05-12 PROBLEM — N04.9 NEPHROTIC SYNDROME: Status: ACTIVE | Noted: 2021-05-11

## 2021-05-12 LAB
ACID FAST MOD KINY STN SPEC: NORMAL
ALBUMIN SERPL BCP-MCNC: 1.2 G/DL (ref 3.5–5.2)
ALBUMIN SERPL ELPH-MCNC: 1.4 G/DL (ref 3.35–5.55)
ALP SERPL-CCNC: 131 U/L (ref 55–135)
ALPHA1 GLOB SERPL ELPH-MCNC: 0.47 G/DL (ref 0.17–0.41)
ALPHA2 GLOB SERPL ELPH-MCNC: 0.88 G/DL (ref 0.43–0.99)
ALT SERPL W/O P-5'-P-CCNC: 6 U/L (ref 10–44)
ANA PATTERN 1: NORMAL
ANA SER QL IF: POSITIVE
ANA TITR SER IF: NORMAL {TITER}
ANION GAP SERPL CALC-SCNC: 5 MMOL/L (ref 8–16)
AST SERPL-CCNC: 14 U/L (ref 10–40)
B-GLOBULIN SERPL ELPH-MCNC: 0.79 G/DL (ref 0.5–1.1)
BASOPHILS # BLD AUTO: 0.08 K/UL (ref 0–0.2)
BASOPHILS NFR BLD: 0.5 % (ref 0–1.9)
BILIRUB SERPL-MCNC: 0.4 MG/DL (ref 0.1–1)
BNP SERPL-MCNC: 82 PG/ML (ref 0–99)
BUN SERPL-MCNC: 66 MG/DL (ref 6–20)
CALCIUM SERPL-MCNC: 7.9 MG/DL (ref 8.7–10.5)
CHLORIDE SERPL-SCNC: 110 MMOL/L (ref 95–110)
CO2 SERPL-SCNC: 18 MMOL/L (ref 23–29)
CREAT SERPL-MCNC: 2.1 MG/DL (ref 0.5–1.4)
DIFFERENTIAL METHOD: ABNORMAL
EOSINOPHIL # BLD AUTO: 0.1 K/UL (ref 0–0.5)
EOSINOPHIL NFR BLD: 0.8 % (ref 0–8)
ERYTHROCYTE [DISTWIDTH] IN BLOOD BY AUTOMATED COUNT: 17 % (ref 11.5–14.5)
EST. GFR  (AFRICAN AMERICAN): 30.1 ML/MIN/1.73 M^2
EST. GFR  (NON AFRICAN AMERICAN): 26.1 ML/MIN/1.73 M^2
GAMMA GLOB SERPL ELPH-MCNC: 1.17 G/DL (ref 0.67–1.58)
GLUCOSE SERPL-MCNC: 178 MG/DL (ref 70–110)
HCT VFR BLD AUTO: 23.3 % (ref 37–48.5)
HGB BLD-MCNC: 7.4 G/DL (ref 12–16)
HIV 1+2 AB+HIV1 P24 AG SERPL QL IA: NEGATIVE
IMM GRANULOCYTES # BLD AUTO: 0.09 K/UL (ref 0–0.04)
IMM GRANULOCYTES NFR BLD AUTO: 0.6 % (ref 0–0.5)
INTERPRETATION UR IFE-IMP: NORMAL
KAPPA LC SER QL IA: 14.8 MG/DL (ref 0.33–1.94)
KAPPA LC/LAMBDA SER IA: 1.81 (ref 0.26–1.65)
LAMBDA LC SER QL IA: 8.19 MG/DL (ref 0.57–2.63)
LYMPHOCYTES # BLD AUTO: 1.3 K/UL (ref 1–4.8)
LYMPHOCYTES NFR BLD: 8.3 % (ref 18–48)
MAGNESIUM SERPL-MCNC: 2.1 MG/DL (ref 1.6–2.6)
MCH RBC QN AUTO: 28.6 PG (ref 27–31)
MCHC RBC AUTO-ENTMCNC: 31.8 G/DL (ref 32–36)
MCV RBC AUTO: 90 FL (ref 82–98)
MONOCYTES # BLD AUTO: 1.3 K/UL (ref 0.3–1)
MONOCYTES NFR BLD: 8.7 % (ref 4–15)
MYCOBACTERIUM SPEC QL CULT: NORMAL
NEUTROPHILS # BLD AUTO: 12.6 K/UL (ref 1.8–7.7)
NEUTROPHILS NFR BLD: 81.1 % (ref 38–73)
NRBC BLD-RTO: 0 /100 WBC
PHOSPHATE SERPL-MCNC: 5.1 MG/DL (ref 2.7–4.5)
PLATELET # BLD AUTO: 738 K/UL (ref 150–450)
PMV BLD AUTO: 9.4 FL (ref 9.2–12.9)
POCT GLUCOSE: 151 MG/DL (ref 70–110)
POCT GLUCOSE: 169 MG/DL (ref 70–110)
POTASSIUM SERPL-SCNC: 4.3 MMOL/L (ref 3.5–5.1)
PROT SERPL-MCNC: 4.7 G/DL (ref 6–8.4)
PROT SERPL-MCNC: 5.4 G/DL (ref 6–8.4)
PROT UR-MCNC: 262 MG/DL (ref 0–15)
RBC # BLD AUTO: 2.59 M/UL (ref 4–5.4)
SODIUM SERPL-SCNC: 133 MMOL/L (ref 136–145)
WBC # BLD AUTO: 15.48 K/UL (ref 3.9–12.7)

## 2021-05-12 PROCEDURE — 20600001 HC STEP DOWN PRIVATE ROOM

## 2021-05-12 PROCEDURE — 84100 ASSAY OF PHOSPHORUS: CPT | Performed by: SURGERY

## 2021-05-12 PROCEDURE — 84165 PROTEIN E-PHORESIS SERUM: CPT | Performed by: INTERNAL MEDICINE

## 2021-05-12 PROCEDURE — 25000003 PHARM REV CODE 250: Performed by: STUDENT IN AN ORGANIZED HEALTH CARE EDUCATION/TRAINING PROGRAM

## 2021-05-12 PROCEDURE — 99024 POSTOP FOLLOW-UP VISIT: CPT | Mod: POP,,, | Performed by: SURGERY

## 2021-05-12 PROCEDURE — 80053 COMPREHEN METABOLIC PANEL: CPT | Performed by: SURGERY

## 2021-05-12 PROCEDURE — 83735 ASSAY OF MAGNESIUM: CPT | Performed by: SURGERY

## 2021-05-12 PROCEDURE — 86235 NUCLEAR ANTIGEN ANTIBODY: CPT | Mod: 59 | Performed by: INTERNAL MEDICINE

## 2021-05-12 PROCEDURE — 84165 PATHOLOGIST INTERPRETATION SPE: ICD-10-PCS | Mod: 26,,, | Performed by: PATHOLOGY

## 2021-05-12 PROCEDURE — 83520 IMMUNOASSAY QUANT NOS NONAB: CPT | Mod: 59 | Performed by: INTERNAL MEDICINE

## 2021-05-12 PROCEDURE — 86334 PATHOLOGIST INTERPRETATION IFE: ICD-10-PCS | Mod: 26,,, | Performed by: PATHOLOGY

## 2021-05-12 PROCEDURE — 99024 PR POST-OP FOLLOW-UP VISIT: ICD-10-PCS | Mod: POP,,, | Performed by: SURGERY

## 2021-05-12 PROCEDURE — 83520 IMMUNOASSAY QUANT NOS NONAB: CPT | Performed by: INTERNAL MEDICINE

## 2021-05-12 PROCEDURE — 99233 SBSQ HOSP IP/OBS HIGH 50: CPT | Mod: GC,,, | Performed by: INTERNAL MEDICINE

## 2021-05-12 PROCEDURE — 99233 PR SUBSEQUENT HOSPITAL CARE,LEVL III: ICD-10-PCS | Mod: GC,,, | Performed by: INTERNAL MEDICINE

## 2021-05-12 PROCEDURE — 97530 THERAPEUTIC ACTIVITIES: CPT

## 2021-05-12 PROCEDURE — 86703 HIV-1/HIV-2 1 RESULT ANTBDY: CPT | Performed by: INTERNAL MEDICINE

## 2021-05-12 PROCEDURE — 86335 PATHOLOGIST INTERPRETATION UIFE: ICD-10-PCS | Mod: 26,,, | Performed by: PATHOLOGY

## 2021-05-12 PROCEDURE — 97116 GAIT TRAINING THERAPY: CPT

## 2021-05-12 PROCEDURE — 63600175 PHARM REV CODE 636 W HCPCS: Performed by: STUDENT IN AN ORGANIZED HEALTH CARE EDUCATION/TRAINING PROGRAM

## 2021-05-12 PROCEDURE — 86039 ANTINUCLEAR ANTIBODIES (ANA): CPT | Performed by: SURGERY

## 2021-05-12 PROCEDURE — 84165 PROTEIN E-PHORESIS SERUM: CPT | Mod: 26,,, | Performed by: PATHOLOGY

## 2021-05-12 PROCEDURE — 84156 ASSAY OF PROTEIN URINE: CPT | Performed by: INTERNAL MEDICINE

## 2021-05-12 PROCEDURE — 63600175 PHARM REV CODE 636 W HCPCS: Performed by: INTERNAL MEDICINE

## 2021-05-12 PROCEDURE — 25000003 PHARM REV CODE 250: Performed by: INTERNAL MEDICINE

## 2021-05-12 PROCEDURE — 85025 COMPLETE CBC W/AUTO DIFF WBC: CPT | Performed by: SURGERY

## 2021-05-12 PROCEDURE — 86335 IMMUNFIX E-PHORSIS/URINE/CSF: CPT | Performed by: INTERNAL MEDICINE

## 2021-05-12 PROCEDURE — 51702 INSERT TEMP BLADDER CATH: CPT

## 2021-05-12 PROCEDURE — 86334 IMMUNOFIX E-PHORESIS SERUM: CPT | Mod: 26,,, | Performed by: PATHOLOGY

## 2021-05-12 PROCEDURE — 86334 IMMUNOFIX E-PHORESIS SERUM: CPT | Performed by: SURGERY

## 2021-05-12 PROCEDURE — 97535 SELF CARE MNGMENT TRAINING: CPT

## 2021-05-12 PROCEDURE — 86038 ANTINUCLEAR ANTIBODIES: CPT | Performed by: INTERNAL MEDICINE

## 2021-05-12 PROCEDURE — 86335 IMMUNFIX E-PHORSIS/URINE/CSF: CPT | Mod: 26,,, | Performed by: PATHOLOGY

## 2021-05-12 RX ORDER — FUROSEMIDE 10 MG/ML
40 INJECTION INTRAMUSCULAR; INTRAVENOUS
Status: DISCONTINUED | OUTPATIENT
Start: 2021-05-12 | End: 2021-05-18

## 2021-05-12 RX ORDER — HYDRALAZINE HYDROCHLORIDE 10 MG/1
10 TABLET, FILM COATED ORAL EVERY 8 HOURS
Status: DISCONTINUED | OUTPATIENT
Start: 2021-05-12 | End: 2021-05-18

## 2021-05-12 RX ORDER — DRONABINOL 2.5 MG/1
2.5 CAPSULE ORAL 2 TIMES DAILY
Status: DISCONTINUED | OUTPATIENT
Start: 2021-05-12 | End: 2021-05-18

## 2021-05-12 RX ADMIN — APIXABAN 5 MG: 5 TABLET, FILM COATED ORAL at 10:05

## 2021-05-12 RX ADMIN — ATORVASTATIN CALCIUM 80 MG: 20 TABLET, FILM COATED ORAL at 10:05

## 2021-05-12 RX ADMIN — HYDRALAZINE HYDROCHLORIDE 10 MG: 10 TABLET, FILM COATED ORAL at 10:05

## 2021-05-12 RX ADMIN — OXYCODONE AND ACETAMINOPHEN 1 TABLET: 10; 325 TABLET ORAL at 02:05

## 2021-05-12 RX ADMIN — OXYCODONE AND ACETAMINOPHEN 1 TABLET: 10; 325 TABLET ORAL at 05:05

## 2021-05-12 RX ADMIN — DOCUSATE SODIUM 50MG AND SENNOSIDES 8.6MG 1 TABLET: 8.6; 5 TABLET, FILM COATED ORAL at 09:05

## 2021-05-12 RX ADMIN — HYDROMORPHONE HYDROCHLORIDE 0.5 MG: 1 INJECTION, SOLUTION INTRAMUSCULAR; INTRAVENOUS; SUBCUTANEOUS at 08:05

## 2021-05-12 RX ADMIN — CARVEDILOL 6.25 MG: 6.25 TABLET, FILM COATED ORAL at 10:05

## 2021-05-12 RX ADMIN — INSULIN ASPART 0 UNITS: 100 INJECTION, SOLUTION INTRAVENOUS; SUBCUTANEOUS at 10:05

## 2021-05-12 RX ADMIN — ERAVACYCLINE 98.2 MG: 50 INJECTION, POWDER, LYOPHILIZED, FOR SOLUTION INTRAVENOUS at 03:05

## 2021-05-12 RX ADMIN — METHOCARBAMOL 500 MG: 500 TABLET ORAL at 09:05

## 2021-05-12 RX ADMIN — GABAPENTIN 600 MG: 300 CAPSULE ORAL at 10:05

## 2021-05-12 RX ADMIN — DRONABINOL 2.5 MG: 2.5 CAPSULE ORAL at 10:05

## 2021-05-12 RX ADMIN — FUROSEMIDE 40 MG: 10 INJECTION, SOLUTION INTRAMUSCULAR; INTRAVENOUS at 06:05

## 2021-05-12 RX ADMIN — APIXABAN 5 MG: 5 TABLET, FILM COATED ORAL at 09:05

## 2021-05-12 RX ADMIN — ALLOPURINOL 100 MG: 100 TABLET ORAL at 09:05

## 2021-05-12 RX ADMIN — DRONABINOL 2.5 MG: 2.5 CAPSULE ORAL at 12:05

## 2021-05-12 RX ADMIN — ASPIRIN 325 MG ORAL TABLET 325 MG: 325 PILL ORAL at 09:05

## 2021-05-12 RX ADMIN — OXYCODONE AND ACETAMINOPHEN 1 TABLET: 10; 325 TABLET ORAL at 10:05

## 2021-05-12 RX ADMIN — HYDRALAZINE HYDROCHLORIDE 10 MG: 10 TABLET, FILM COATED ORAL at 03:05

## 2021-05-12 RX ADMIN — NYSTATIN: 100000 POWDER TOPICAL at 09:05

## 2021-05-12 RX ADMIN — CYCLOBENZAPRINE HYDROCHLORIDE 5 MG: 5 TABLET, FILM COATED ORAL at 03:05

## 2021-05-12 RX ADMIN — NYSTATIN: 100000 POWDER TOPICAL at 10:05

## 2021-05-12 RX ADMIN — CYCLOBENZAPRINE HYDROCHLORIDE 5 MG: 5 TABLET, FILM COATED ORAL at 10:05

## 2021-05-12 RX ADMIN — GABAPENTIN 600 MG: 300 CAPSULE ORAL at 03:05

## 2021-05-12 RX ADMIN — POLYETHYLENE GLYCOL 3350 17 G: 17 POWDER, FOR SOLUTION ORAL at 09:05

## 2021-05-13 LAB
ALBUMIN SERPL BCP-MCNC: 1.2 G/DL (ref 3.5–5.2)
ALP SERPL-CCNC: 168 U/L (ref 55–135)
ALT SERPL W/O P-5'-P-CCNC: 11 U/L (ref 10–44)
ANION GAP SERPL CALC-SCNC: 7 MMOL/L (ref 8–16)
ANTI SM ANTIBODY: 0.07 RATIO (ref 0–0.99)
ANTI SM/RNP ANTIBODY: 0.08 RATIO (ref 0–0.99)
ANTI-SM INTERPRETATION: NEGATIVE
ANTI-SM/RNP INTERPRETATION: NEGATIVE
ANTI-SSA ANTIBODY: 0.05 RATIO (ref 0–0.99)
ANTI-SSA INTERPRETATION: NEGATIVE
ANTI-SSB ANTIBODY: 0.06 RATIO (ref 0–0.99)
ANTI-SSB INTERPRETATION: NEGATIVE
AST SERPL-CCNC: 25 U/L (ref 10–40)
BASOPHILS # BLD AUTO: 0.09 K/UL (ref 0–0.2)
BASOPHILS # BLD AUTO: 0.1 K/UL (ref 0–0.2)
BASOPHILS NFR BLD: 0.5 % (ref 0–1.9)
BASOPHILS NFR BLD: 0.5 % (ref 0–1.9)
BILIRUB SERPL-MCNC: 0.3 MG/DL (ref 0.1–1)
BUN SERPL-MCNC: 63 MG/DL (ref 6–20)
CALCIUM SERPL-MCNC: 7.6 MG/DL (ref 8.7–10.5)
CHLORIDE SERPL-SCNC: 108 MMOL/L (ref 95–110)
CO2 SERPL-SCNC: 17 MMOL/L (ref 23–29)
CREAT SERPL-MCNC: 2.2 MG/DL (ref 0.5–1.4)
DIFFERENTIAL METHOD: ABNORMAL
DIFFERENTIAL METHOD: ABNORMAL
DSDNA AB SER-ACNC: NORMAL [IU]/ML
EOSINOPHIL # BLD AUTO: 0.2 K/UL (ref 0–0.5)
EOSINOPHIL # BLD AUTO: 0.2 K/UL (ref 0–0.5)
EOSINOPHIL NFR BLD: 1 % (ref 0–8)
EOSINOPHIL NFR BLD: 1.2 % (ref 0–8)
ERYTHROCYTE [DISTWIDTH] IN BLOOD BY AUTOMATED COUNT: 17.1 % (ref 11.5–14.5)
ERYTHROCYTE [DISTWIDTH] IN BLOOD BY AUTOMATED COUNT: 17.3 % (ref 11.5–14.5)
EST. GFR  (AFRICAN AMERICAN): 28.5 ML/MIN/1.73 M^2
EST. GFR  (NON AFRICAN AMERICAN): 24.7 ML/MIN/1.73 M^2
FACT X PPP CHRO-ACNC: >1.5 IU/ML (ref 0.3–0.7)
GLUCOSE SERPL-MCNC: 133 MG/DL (ref 70–110)
HCT VFR BLD AUTO: 24.2 % (ref 37–48.5)
HCT VFR BLD AUTO: 24.4 % (ref 37–48.5)
HGB BLD-MCNC: 7.6 G/DL (ref 12–16)
HGB BLD-MCNC: 7.7 G/DL (ref 12–16)
IMM GRANULOCYTES # BLD AUTO: 0.09 K/UL (ref 0–0.04)
IMM GRANULOCYTES # BLD AUTO: 0.11 K/UL (ref 0–0.04)
IMM GRANULOCYTES NFR BLD AUTO: 0.5 % (ref 0–0.5)
IMM GRANULOCYTES NFR BLD AUTO: 0.6 % (ref 0–0.5)
INR PPP: 1.3 (ref 0.8–1.2)
LYMPHOCYTES # BLD AUTO: 2.1 K/UL (ref 1–4.8)
LYMPHOCYTES # BLD AUTO: 2.3 K/UL (ref 1–4.8)
LYMPHOCYTES NFR BLD: 11.4 % (ref 18–48)
LYMPHOCYTES NFR BLD: 12.1 % (ref 18–48)
MAGNESIUM SERPL-MCNC: 2 MG/DL (ref 1.6–2.6)
MCH RBC QN AUTO: 28.8 PG (ref 27–31)
MCH RBC QN AUTO: 29.7 PG (ref 27–31)
MCHC RBC AUTO-ENTMCNC: 31.4 G/DL (ref 32–36)
MCHC RBC AUTO-ENTMCNC: 31.6 G/DL (ref 32–36)
MCV RBC AUTO: 92 FL (ref 82–98)
MCV RBC AUTO: 94 FL (ref 82–98)
MONOCYTES # BLD AUTO: 1.9 K/UL (ref 0.3–1)
MONOCYTES # BLD AUTO: 2 K/UL (ref 0.3–1)
MONOCYTES NFR BLD: 10.1 % (ref 4–15)
MONOCYTES NFR BLD: 10.7 % (ref 4–15)
NEUTROPHILS # BLD AUTO: 14.1 K/UL (ref 1.8–7.7)
NEUTROPHILS # BLD AUTO: 14.1 K/UL (ref 1.8–7.7)
NEUTROPHILS NFR BLD: 75.5 % (ref 38–73)
NEUTROPHILS NFR BLD: 75.9 % (ref 38–73)
NRBC BLD-RTO: 0 /100 WBC
NRBC BLD-RTO: 0 /100 WBC
PATHOLOGIST INTERPRETATION UIFE: NORMAL
PHOSPHATE SERPL-MCNC: 5.2 MG/DL (ref 2.7–4.5)
PLATELET # BLD AUTO: 757 K/UL (ref 150–450)
PLATELET # BLD AUTO: 766 K/UL (ref 150–450)
PMV BLD AUTO: 9.4 FL (ref 9.2–12.9)
PMV BLD AUTO: 9.7 FL (ref 9.2–12.9)
POCT GLUCOSE: 153 MG/DL (ref 70–110)
POTASSIUM SERPL-SCNC: 4.4 MMOL/L (ref 3.5–5.1)
PROT SERPL-MCNC: 5.5 G/DL (ref 6–8.4)
PROTHROMBIN TIME: 13.9 SEC (ref 9–12.5)
RBC # BLD AUTO: 2.59 M/UL (ref 4–5.4)
RBC # BLD AUTO: 2.64 M/UL (ref 4–5.4)
SODIUM SERPL-SCNC: 132 MMOL/L (ref 136–145)
WBC # BLD AUTO: 18.55 K/UL (ref 3.9–12.7)
WBC # BLD AUTO: 18.63 K/UL (ref 3.9–12.7)

## 2021-05-13 PROCEDURE — 99223 1ST HOSP IP/OBS HIGH 75: CPT | Mod: ,,, | Performed by: PSYCHIATRY & NEUROLOGY

## 2021-05-13 PROCEDURE — 25000003 PHARM REV CODE 250: Performed by: STUDENT IN AN ORGANIZED HEALTH CARE EDUCATION/TRAINING PROGRAM

## 2021-05-13 PROCEDURE — 85025 COMPLETE CBC W/AUTO DIFF WBC: CPT | Mod: 91 | Performed by: STUDENT IN AN ORGANIZED HEALTH CARE EDUCATION/TRAINING PROGRAM

## 2021-05-13 PROCEDURE — 93926 LOWER EXTREMITY STUDY: CPT | Performed by: SURGERY

## 2021-05-13 PROCEDURE — 63600175 PHARM REV CODE 636 W HCPCS: Performed by: STUDENT IN AN ORGANIZED HEALTH CARE EDUCATION/TRAINING PROGRAM

## 2021-05-13 PROCEDURE — 20600001 HC STEP DOWN PRIVATE ROOM

## 2021-05-13 PROCEDURE — 85610 PROTHROMBIN TIME: CPT | Performed by: STUDENT IN AN ORGANIZED HEALTH CARE EDUCATION/TRAINING PROGRAM

## 2021-05-13 PROCEDURE — 85520 HEPARIN ASSAY: CPT | Performed by: STUDENT IN AN ORGANIZED HEALTH CARE EDUCATION/TRAINING PROGRAM

## 2021-05-13 PROCEDURE — 99233 PR SUBSEQUENT HOSPITAL CARE,LEVL III: ICD-10-PCS | Mod: ,,, | Performed by: PODIATRIST

## 2021-05-13 PROCEDURE — 83735 ASSAY OF MAGNESIUM: CPT | Performed by: SURGERY

## 2021-05-13 PROCEDURE — 97530 THERAPEUTIC ACTIVITIES: CPT

## 2021-05-13 PROCEDURE — 25000003 PHARM REV CODE 250: Performed by: INTERNAL MEDICINE

## 2021-05-13 PROCEDURE — 99232 PR SUBSEQUENT HOSPITAL CARE,LEVL II: ICD-10-PCS | Mod: GC,,, | Performed by: INTERNAL MEDICINE

## 2021-05-13 PROCEDURE — 99232 SBSQ HOSP IP/OBS MODERATE 35: CPT | Mod: GC,,, | Performed by: INTERNAL MEDICINE

## 2021-05-13 PROCEDURE — 80053 COMPREHEN METABOLIC PANEL: CPT | Performed by: SURGERY

## 2021-05-13 PROCEDURE — 63600175 PHARM REV CODE 636 W HCPCS: Performed by: INTERNAL MEDICINE

## 2021-05-13 PROCEDURE — 84100 ASSAY OF PHOSPHORUS: CPT | Performed by: SURGERY

## 2021-05-13 PROCEDURE — 85025 COMPLETE CBC W/AUTO DIFF WBC: CPT | Performed by: SURGERY

## 2021-05-13 PROCEDURE — 97535 SELF CARE MNGMENT TRAINING: CPT | Mod: CO

## 2021-05-13 PROCEDURE — 99233 SBSQ HOSP IP/OBS HIGH 50: CPT | Mod: ,,, | Performed by: PODIATRIST

## 2021-05-13 PROCEDURE — 99223 PR INITIAL HOSPITAL CARE,LEVL III: ICD-10-PCS | Mod: ,,, | Performed by: PSYCHIATRY & NEUROLOGY

## 2021-05-13 RX ORDER — AMITRIPTYLINE HYDROCHLORIDE 25 MG/1
25 TABLET, FILM COATED ORAL NIGHTLY
Status: DISCONTINUED | OUTPATIENT
Start: 2021-05-13 | End: 2021-05-21

## 2021-05-13 RX ORDER — HEPARIN SODIUM,PORCINE/D5W 25000/250
0-40 INTRAVENOUS SOLUTION INTRAVENOUS CONTINUOUS
Status: DISCONTINUED | OUTPATIENT
Start: 2021-05-13 | End: 2021-05-17

## 2021-05-13 RX ADMIN — HEPARIN SODIUM AND DEXTROSE 18 UNITS/KG/HR: 10000; 5 INJECTION INTRAVENOUS at 10:05

## 2021-05-13 RX ADMIN — GABAPENTIN 600 MG: 300 CAPSULE ORAL at 08:05

## 2021-05-13 RX ADMIN — ATORVASTATIN CALCIUM 80 MG: 20 TABLET, FILM COATED ORAL at 08:05

## 2021-05-13 RX ADMIN — DRONABINOL 2.5 MG: 2.5 CAPSULE ORAL at 08:05

## 2021-05-13 RX ADMIN — FUROSEMIDE 40 MG: 10 INJECTION, SOLUTION INTRAMUSCULAR; INTRAVENOUS at 05:05

## 2021-05-13 RX ADMIN — DRONABINOL 2.5 MG: 2.5 CAPSULE ORAL at 09:05

## 2021-05-13 RX ADMIN — DOCUSATE SODIUM 50MG AND SENNOSIDES 8.6MG 1 TABLET: 8.6; 5 TABLET, FILM COATED ORAL at 09:05

## 2021-05-13 RX ADMIN — HYDRALAZINE HYDROCHLORIDE 10 MG: 10 TABLET, FILM COATED ORAL at 09:05

## 2021-05-13 RX ADMIN — APIXABAN 5 MG: 5 TABLET, FILM COATED ORAL at 09:05

## 2021-05-13 RX ADMIN — OXYCODONE HYDROCHLORIDE AND ACETAMINOPHEN 1 TABLET: 5; 325 TABLET ORAL at 04:05

## 2021-05-13 RX ADMIN — NYSTATIN: 100000 POWDER TOPICAL at 08:05

## 2021-05-13 RX ADMIN — OXYCODONE AND ACETAMINOPHEN 1 TABLET: 10; 325 TABLET ORAL at 08:05

## 2021-05-13 RX ADMIN — ERAVACYCLINE 98.2 MG: 50 INJECTION, POWDER, LYOPHILIZED, FOR SOLUTION INTRAVENOUS at 03:05

## 2021-05-13 RX ADMIN — ALLOPURINOL 100 MG: 100 TABLET ORAL at 09:05

## 2021-05-13 RX ADMIN — HYDRALAZINE HYDROCHLORIDE 10 MG: 10 TABLET, FILM COATED ORAL at 05:05

## 2021-05-13 RX ADMIN — CYCLOBENZAPRINE HYDROCHLORIDE 5 MG: 5 TABLET, FILM COATED ORAL at 08:05

## 2021-05-13 RX ADMIN — CYCLOBENZAPRINE HYDROCHLORIDE 5 MG: 5 TABLET, FILM COATED ORAL at 09:05

## 2021-05-13 RX ADMIN — FUROSEMIDE 40 MG: 10 INJECTION, SOLUTION INTRAMUSCULAR; INTRAVENOUS at 07:05

## 2021-05-13 RX ADMIN — GABAPENTIN 600 MG: 300 CAPSULE ORAL at 09:05

## 2021-05-13 RX ADMIN — ACETAMINOPHEN 650 MG: 325 TABLET ORAL at 11:05

## 2021-05-13 RX ADMIN — AMITRIPTYLINE HYDROCHLORIDE 25 MG: 25 TABLET, FILM COATED ORAL at 11:05

## 2021-05-13 RX ADMIN — ERAVACYCLINE 98.2 MG: 50 INJECTION, POWDER, LYOPHILIZED, FOR SOLUTION INTRAVENOUS at 04:05

## 2021-05-13 RX ADMIN — CARVEDILOL 6.25 MG: 6.25 TABLET, FILM COATED ORAL at 08:05

## 2021-05-13 RX ADMIN — ASPIRIN 325 MG ORAL TABLET 325 MG: 325 PILL ORAL at 09:05

## 2021-05-13 RX ADMIN — POLYETHYLENE GLYCOL 3350 17 G: 17 POWDER, FOR SOLUTION ORAL at 09:05

## 2021-05-13 RX ADMIN — OXYCODONE AND ACETAMINOPHEN 1 TABLET: 10; 325 TABLET ORAL at 12:05

## 2021-05-14 LAB
ALBUMIN SERPL BCP-MCNC: 1.2 G/DL (ref 3.5–5.2)
ALP SERPL-CCNC: 203 U/L (ref 55–135)
ALT SERPL W/O P-5'-P-CCNC: 13 U/L (ref 10–44)
ANION GAP SERPL CALC-SCNC: 7 MMOL/L (ref 8–16)
ANISOCYTOSIS BLD QL SMEAR: SLIGHT
AST SERPL-CCNC: 31 U/L (ref 10–40)
BASOPHILS # BLD AUTO: 0.1 K/UL (ref 0–0.2)
BASOPHILS # BLD AUTO: 0.11 K/UL (ref 0–0.2)
BASOPHILS NFR BLD: 0.6 % (ref 0–1.9)
BASOPHILS NFR BLD: 0.6 % (ref 0–1.9)
BILIRUB SERPL-MCNC: 0.2 MG/DL (ref 0.1–1)
BUN SERPL-MCNC: 75 MG/DL (ref 6–20)
BURR CELLS BLD QL SMEAR: ABNORMAL
CALCIUM SERPL-MCNC: 7.5 MG/DL (ref 8.7–10.5)
CHLORIDE SERPL-SCNC: 108 MMOL/L (ref 95–110)
CK SERPL-CCNC: 173 U/L (ref 20–180)
CO2 SERPL-SCNC: 16 MMOL/L (ref 23–29)
CREAT SERPL-MCNC: 2.5 MG/DL (ref 0.5–1.4)
DIFFERENTIAL METHOD: ABNORMAL
DIFFERENTIAL METHOD: ABNORMAL
EOSINOPHIL # BLD AUTO: 0.3 K/UL (ref 0–0.5)
EOSINOPHIL # BLD AUTO: 0.3 K/UL (ref 0–0.5)
EOSINOPHIL NFR BLD: 1.5 % (ref 0–8)
EOSINOPHIL NFR BLD: 1.6 % (ref 0–8)
ERYTHROCYTE [DISTWIDTH] IN BLOOD BY AUTOMATED COUNT: 17.1 % (ref 11.5–14.5)
ERYTHROCYTE [DISTWIDTH] IN BLOOD BY AUTOMATED COUNT: 17.2 % (ref 11.5–14.5)
EST. GFR  (AFRICAN AMERICAN): 24.4 ML/MIN/1.73 M^2
EST. GFR  (NON AFRICAN AMERICAN): 21.1 ML/MIN/1.73 M^2
FACT X PPP CHRO-ACNC: 1.06 IU/ML (ref 0.3–0.7)
FACT X PPP CHRO-ACNC: >1.5 IU/ML (ref 0.3–0.7)
GLUCOSE SERPL-MCNC: 162 MG/DL (ref 70–110)
HCT VFR BLD AUTO: 22.9 % (ref 37–48.5)
HCT VFR BLD AUTO: 22.9 % (ref 37–48.5)
HGB BLD-MCNC: 7.2 G/DL (ref 12–16)
HGB BLD-MCNC: 7.4 G/DL (ref 12–16)
IMM GRANULOCYTES # BLD AUTO: 0.09 K/UL (ref 0–0.04)
IMM GRANULOCYTES # BLD AUTO: 0.09 K/UL (ref 0–0.04)
IMM GRANULOCYTES NFR BLD AUTO: 0.5 % (ref 0–0.5)
IMM GRANULOCYTES NFR BLD AUTO: 0.5 % (ref 0–0.5)
INTERPRETATION SERPL IFE-IMP: NORMAL
LYMPHOCYTES # BLD AUTO: 2.5 K/UL (ref 1–4.8)
LYMPHOCYTES # BLD AUTO: 2.5 K/UL (ref 1–4.8)
LYMPHOCYTES NFR BLD: 13.9 % (ref 18–48)
LYMPHOCYTES NFR BLD: 14 % (ref 18–48)
MAGNESIUM SERPL-MCNC: 2.1 MG/DL (ref 1.6–2.6)
MCH RBC QN AUTO: 29.6 PG (ref 27–31)
MCH RBC QN AUTO: 30.3 PG (ref 27–31)
MCHC RBC AUTO-ENTMCNC: 31.4 G/DL (ref 32–36)
MCHC RBC AUTO-ENTMCNC: 32.3 G/DL (ref 32–36)
MCV RBC AUTO: 94 FL (ref 82–98)
MCV RBC AUTO: 94 FL (ref 82–98)
MONOCYTES # BLD AUTO: 1.9 K/UL (ref 0.3–1)
MONOCYTES # BLD AUTO: 2 K/UL (ref 0.3–1)
MONOCYTES NFR BLD: 10.4 % (ref 4–15)
MONOCYTES NFR BLD: 11.3 % (ref 4–15)
NEUTROPHILS # BLD AUTO: 13.1 K/UL (ref 1.8–7.7)
NEUTROPHILS # BLD AUTO: 13.2 K/UL (ref 1.8–7.7)
NEUTROPHILS NFR BLD: 72.1 % (ref 38–73)
NEUTROPHILS NFR BLD: 73 % (ref 38–73)
NRBC BLD-RTO: 0 /100 WBC
NRBC BLD-RTO: 0 /100 WBC
PHOSPHATE SERPL-MCNC: 5.3 MG/DL (ref 2.7–4.5)
PHOSPHOLIPASE A2 RECEPTOR, ELISA: <2 RU/ML
PHOSPHOLIPASE A2 RECEPTOR, IFA: NEGATIVE
PLATELET # BLD AUTO: 693 K/UL (ref 150–450)
PLATELET # BLD AUTO: 751 K/UL (ref 150–450)
PLATELET BLD QL SMEAR: ABNORMAL
PMV BLD AUTO: 9.7 FL (ref 9.2–12.9)
PMV BLD AUTO: 9.7 FL (ref 9.2–12.9)
POCT GLUCOSE: 145 MG/DL (ref 70–110)
POCT GLUCOSE: 153 MG/DL (ref 70–110)
POCT GLUCOSE: 160 MG/DL (ref 70–110)
POIKILOCYTOSIS BLD QL SMEAR: SLIGHT
POTASSIUM SERPL-SCNC: 4.6 MMOL/L (ref 3.5–5.1)
PROT SERPL-MCNC: 5.2 G/DL (ref 6–8.4)
RBC # BLD AUTO: 2.43 M/UL (ref 4–5.4)
RBC # BLD AUTO: 2.44 M/UL (ref 4–5.4)
SODIUM SERPL-SCNC: 131 MMOL/L (ref 136–145)
WBC # BLD AUTO: 18.03 K/UL (ref 3.9–12.7)
WBC # BLD AUTO: 18.09 K/UL (ref 3.9–12.7)

## 2021-05-14 PROCEDURE — 99232 SBSQ HOSP IP/OBS MODERATE 35: CPT | Mod: ,,, | Performed by: NURSE PRACTITIONER

## 2021-05-14 PROCEDURE — 83735 ASSAY OF MAGNESIUM: CPT | Performed by: SURGERY

## 2021-05-14 PROCEDURE — 85520 HEPARIN ASSAY: CPT | Performed by: STUDENT IN AN ORGANIZED HEALTH CARE EDUCATION/TRAINING PROGRAM

## 2021-05-14 PROCEDURE — 25000003 PHARM REV CODE 250: Performed by: STUDENT IN AN ORGANIZED HEALTH CARE EDUCATION/TRAINING PROGRAM

## 2021-05-14 PROCEDURE — 85025 COMPLETE CBC W/AUTO DIFF WBC: CPT | Performed by: SURGERY

## 2021-05-14 PROCEDURE — 63600175 PHARM REV CODE 636 W HCPCS: Performed by: STUDENT IN AN ORGANIZED HEALTH CARE EDUCATION/TRAINING PROGRAM

## 2021-05-14 PROCEDURE — 25000003 PHARM REV CODE 250: Performed by: INTERNAL MEDICINE

## 2021-05-14 PROCEDURE — 82550 ASSAY OF CK (CPK): CPT | Performed by: STUDENT IN AN ORGANIZED HEALTH CARE EDUCATION/TRAINING PROGRAM

## 2021-05-14 PROCEDURE — 63600175 PHARM REV CODE 636 W HCPCS: Performed by: INTERNAL MEDICINE

## 2021-05-14 PROCEDURE — 99223 1ST HOSP IP/OBS HIGH 75: CPT | Mod: ,,, | Performed by: INTERNAL MEDICINE

## 2021-05-14 PROCEDURE — 99232 PR SUBSEQUENT HOSPITAL CARE,LEVL II: ICD-10-PCS | Mod: GC,,, | Performed by: INTERNAL MEDICINE

## 2021-05-14 PROCEDURE — 20600001 HC STEP DOWN PRIVATE ROOM

## 2021-05-14 PROCEDURE — 99223 PR INITIAL HOSPITAL CARE,LEVL III: ICD-10-PCS | Mod: ,,, | Performed by: INTERNAL MEDICINE

## 2021-05-14 PROCEDURE — 97535 SELF CARE MNGMENT TRAINING: CPT

## 2021-05-14 PROCEDURE — 97530 THERAPEUTIC ACTIVITIES: CPT

## 2021-05-14 PROCEDURE — 85520 HEPARIN ASSAY: CPT | Mod: 91 | Performed by: SURGERY

## 2021-05-14 PROCEDURE — 99232 PR SUBSEQUENT HOSPITAL CARE,LEVL II: ICD-10-PCS | Mod: ,,, | Performed by: NURSE PRACTITIONER

## 2021-05-14 PROCEDURE — 85025 COMPLETE CBC W/AUTO DIFF WBC: CPT | Mod: 91 | Performed by: STUDENT IN AN ORGANIZED HEALTH CARE EDUCATION/TRAINING PROGRAM

## 2021-05-14 PROCEDURE — 80053 COMPREHEN METABOLIC PANEL: CPT | Performed by: SURGERY

## 2021-05-14 PROCEDURE — 99232 SBSQ HOSP IP/OBS MODERATE 35: CPT | Mod: GC,,, | Performed by: INTERNAL MEDICINE

## 2021-05-14 PROCEDURE — 84100 ASSAY OF PHOSPHORUS: CPT | Performed by: SURGERY

## 2021-05-14 PROCEDURE — 97542 WHEELCHAIR MNGMENT TRAINING: CPT

## 2021-05-14 RX ORDER — SODIUM BICARBONATE 650 MG/1
650 TABLET ORAL 3 TIMES DAILY
Status: DISCONTINUED | OUTPATIENT
Start: 2021-05-14 | End: 2021-05-17

## 2021-05-14 RX ADMIN — METHOCARBAMOL 500 MG: 500 TABLET ORAL at 06:05

## 2021-05-14 RX ADMIN — NYSTATIN: 100000 POWDER TOPICAL at 09:05

## 2021-05-14 RX ADMIN — HYDRALAZINE HYDROCHLORIDE 10 MG: 10 TABLET, FILM COATED ORAL at 06:05

## 2021-05-14 RX ADMIN — ALLOPURINOL 100 MG: 100 TABLET ORAL at 08:05

## 2021-05-14 RX ADMIN — CARVEDILOL 6.25 MG: 6.25 TABLET, FILM COATED ORAL at 08:05

## 2021-05-14 RX ADMIN — ACETAMINOPHEN 325 MG: 325 TABLET ORAL at 06:05

## 2021-05-14 RX ADMIN — ERAVACYCLINE 98.2 MG: 50 INJECTION, POWDER, LYOPHILIZED, FOR SOLUTION INTRAVENOUS at 02:05

## 2021-05-14 RX ADMIN — FUROSEMIDE 40 MG: 10 INJECTION, SOLUTION INTRAMUSCULAR; INTRAVENOUS at 06:05

## 2021-05-14 RX ADMIN — ACETAMINOPHEN 650 MG: 325 TABLET ORAL at 06:05

## 2021-05-14 RX ADMIN — GABAPENTIN 600 MG: 300 CAPSULE ORAL at 08:05

## 2021-05-14 RX ADMIN — SODIUM CHLORIDE: 0.9 INJECTION, SOLUTION INTRAVENOUS at 02:05

## 2021-05-14 RX ADMIN — ATORVASTATIN CALCIUM 80 MG: 20 TABLET, FILM COATED ORAL at 08:05

## 2021-05-14 RX ADMIN — HYDROMORPHONE HYDROCHLORIDE 0.5 MG: 1 INJECTION, SOLUTION INTRAMUSCULAR; INTRAVENOUS; SUBCUTANEOUS at 03:05

## 2021-05-14 RX ADMIN — ACETAMINOPHEN 650 MG: 325 TABLET ORAL at 11:05

## 2021-05-14 RX ADMIN — HYDROMORPHONE HYDROCHLORIDE 1 MG: 1 INJECTION, SOLUTION INTRAMUSCULAR; INTRAVENOUS; SUBCUTANEOUS at 11:05

## 2021-05-14 RX ADMIN — HYDROMORPHONE HYDROCHLORIDE 1 MG: 1 INJECTION, SOLUTION INTRAMUSCULAR; INTRAVENOUS; SUBCUTANEOUS at 10:05

## 2021-05-14 RX ADMIN — HYDRALAZINE HYDROCHLORIDE 10 MG: 10 TABLET, FILM COATED ORAL at 02:05

## 2021-05-14 RX ADMIN — HYDRALAZINE HYDROCHLORIDE 10 MG: 10 TABLET, FILM COATED ORAL at 09:05

## 2021-05-14 RX ADMIN — AMITRIPTYLINE HYDROCHLORIDE 25 MG: 25 TABLET, FILM COATED ORAL at 08:05

## 2021-05-14 RX ADMIN — CYCLOBENZAPRINE HYDROCHLORIDE 5 MG: 5 TABLET, FILM COATED ORAL at 08:05

## 2021-05-14 RX ADMIN — HEPARIN SODIUM AND DEXTROSE 10 UNITS/KG/HR: 10000; 5 INJECTION INTRAVENOUS at 11:05

## 2021-05-14 RX ADMIN — DRONABINOL 2.5 MG: 2.5 CAPSULE ORAL at 08:05

## 2021-05-14 RX ADMIN — NYSTATIN: 100000 POWDER TOPICAL at 08:05

## 2021-05-14 RX ADMIN — ASPIRIN 325 MG ORAL TABLET 325 MG: 325 PILL ORAL at 08:05

## 2021-05-14 RX ADMIN — OXYCODONE AND ACETAMINOPHEN 1 TABLET: 10; 325 TABLET ORAL at 12:05

## 2021-05-14 RX ADMIN — SODIUM BICARBONATE 650 MG TABLET 650 MG: at 08:05

## 2021-05-14 RX ADMIN — OXYCODONE AND ACETAMINOPHEN 1 TABLET: 10; 325 TABLET ORAL at 08:05

## 2021-05-14 RX ADMIN — DOCUSATE SODIUM 50MG AND SENNOSIDES 8.6MG 1 TABLET: 8.6; 5 TABLET, FILM COATED ORAL at 08:05

## 2021-05-14 RX ADMIN — GABAPENTIN 600 MG: 300 CAPSULE ORAL at 02:05

## 2021-05-14 RX ADMIN — CYCLOBENZAPRINE HYDROCHLORIDE 5 MG: 5 TABLET, FILM COATED ORAL at 02:05

## 2021-05-15 LAB
ALBUMIN SERPL BCP-MCNC: 1.3 G/DL (ref 3.5–5.2)
ALLENS TEST: ABNORMAL
ALP SERPL-CCNC: 268 U/L (ref 55–135)
ALT SERPL W/O P-5'-P-CCNC: 18 U/L (ref 10–44)
ANION GAP SERPL CALC-SCNC: 8 MMOL/L (ref 8–16)
AST SERPL-CCNC: 38 U/L (ref 10–40)
BASOPHILS # BLD AUTO: 0.09 K/UL (ref 0–0.2)
BASOPHILS NFR BLD: 0.5 % (ref 0–1.9)
BILIRUB SERPL-MCNC: 0.2 MG/DL (ref 0.1–1)
BUN SERPL-MCNC: 81 MG/DL (ref 6–20)
CALCIUM SERPL-MCNC: 8 MG/DL (ref 8.7–10.5)
CHLORIDE SERPL-SCNC: 109 MMOL/L (ref 95–110)
CO2 SERPL-SCNC: 19 MMOL/L (ref 23–29)
CREAT SERPL-MCNC: 2.6 MG/DL (ref 0.5–1.4)
DELSYS: ABNORMAL
DIFFERENTIAL METHOD: ABNORMAL
EOSINOPHIL # BLD AUTO: 0.1 K/UL (ref 0–0.5)
EOSINOPHIL NFR BLD: 0.5 % (ref 0–8)
ERYTHROCYTE [DISTWIDTH] IN BLOOD BY AUTOMATED COUNT: 17.2 % (ref 11.5–14.5)
EST. GFR  (AFRICAN AMERICAN): 23.2 ML/MIN/1.73 M^2
EST. GFR  (NON AFRICAN AMERICAN): 20.2 ML/MIN/1.73 M^2
FACT X PPP CHRO-ACNC: 0.59 IU/ML (ref 0.3–0.7)
FACT X PPP CHRO-ACNC: 0.63 IU/ML (ref 0.3–0.7)
FACT X PPP CHRO-ACNC: 0.86 IU/ML (ref 0.3–0.7)
GLUCOSE SERPL-MCNC: 146 MG/DL (ref 70–110)
HCO3 UR-SCNC: 18.7 MMOL/L (ref 24–28)
HCT VFR BLD AUTO: 25.5 % (ref 37–48.5)
HGB BLD-MCNC: 8.2 G/DL (ref 12–16)
IMM GRANULOCYTES # BLD AUTO: 0.1 K/UL (ref 0–0.04)
IMM GRANULOCYTES NFR BLD AUTO: 0.5 % (ref 0–0.5)
LYMPHOCYTES # BLD AUTO: 1.3 K/UL (ref 1–4.8)
LYMPHOCYTES NFR BLD: 6.8 % (ref 18–48)
MAGNESIUM SERPL-MCNC: 2.1 MG/DL (ref 1.6–2.6)
MCH RBC QN AUTO: 29.9 PG (ref 27–31)
MCHC RBC AUTO-ENTMCNC: 32.2 G/DL (ref 32–36)
MCV RBC AUTO: 93 FL (ref 82–98)
MODE: ABNORMAL
MONOCYTES # BLD AUTO: 1.4 K/UL (ref 0.3–1)
MONOCYTES NFR BLD: 7.3 % (ref 4–15)
NEUTROPHILS # BLD AUTO: 16.3 K/UL (ref 1.8–7.7)
NEUTROPHILS NFR BLD: 84.4 % (ref 38–73)
NRBC BLD-RTO: 0 /100 WBC
PCO2 BLDA: 37.4 MMHG (ref 35–45)
PH SMN: 7.31 [PH] (ref 7.35–7.45)
PHOSPHATE SERPL-MCNC: 5.6 MG/DL (ref 2.7–4.5)
PLATELET # BLD AUTO: 824 K/UL (ref 150–450)
PMV BLD AUTO: 9.8 FL (ref 9.2–12.9)
PO2 BLDA: 45 MMHG (ref 40–60)
POC BE: -8 MMOL/L
POC SATURATED O2: 77 % (ref 95–100)
POC TCO2: 20 MMOL/L (ref 24–29)
POCT GLUCOSE: 110 MG/DL (ref 70–110)
POCT GLUCOSE: 125 MG/DL (ref 70–110)
POCT GLUCOSE: 135 MG/DL (ref 70–110)
POCT GLUCOSE: 138 MG/DL (ref 70–110)
POCT GLUCOSE: 140 MG/DL (ref 70–110)
POCT GLUCOSE: 152 MG/DL (ref 70–110)
POTASSIUM SERPL-SCNC: 4.9 MMOL/L (ref 3.5–5.1)
PROT SERPL-MCNC: 5.8 G/DL (ref 6–8.4)
RBC # BLD AUTO: 2.74 M/UL (ref 4–5.4)
SAMPLE: ABNORMAL
SITE: ABNORMAL
SODIUM SERPL-SCNC: 136 MMOL/L (ref 136–145)
SP02: 97
WBC # BLD AUTO: 19.26 K/UL (ref 3.9–12.7)

## 2021-05-15 PROCEDURE — 63600175 PHARM REV CODE 636 W HCPCS: Performed by: STUDENT IN AN ORGANIZED HEALTH CARE EDUCATION/TRAINING PROGRAM

## 2021-05-15 PROCEDURE — 25000003 PHARM REV CODE 250: Performed by: STUDENT IN AN ORGANIZED HEALTH CARE EDUCATION/TRAINING PROGRAM

## 2021-05-15 PROCEDURE — 85520 HEPARIN ASSAY: CPT | Mod: 91 | Performed by: STUDENT IN AN ORGANIZED HEALTH CARE EDUCATION/TRAINING PROGRAM

## 2021-05-15 PROCEDURE — 80053 COMPREHEN METABOLIC PANEL: CPT | Performed by: SURGERY

## 2021-05-15 PROCEDURE — 82803 BLOOD GASES ANY COMBINATION: CPT

## 2021-05-15 PROCEDURE — 25000003 PHARM REV CODE 250: Performed by: INTERNAL MEDICINE

## 2021-05-15 PROCEDURE — 85520 HEPARIN ASSAY: CPT | Performed by: SURGERY

## 2021-05-15 PROCEDURE — 20600001 HC STEP DOWN PRIVATE ROOM

## 2021-05-15 PROCEDURE — 94761 N-INVAS EAR/PLS OXIMETRY MLT: CPT

## 2021-05-15 PROCEDURE — 85025 COMPLETE CBC W/AUTO DIFF WBC: CPT | Performed by: STUDENT IN AN ORGANIZED HEALTH CARE EDUCATION/TRAINING PROGRAM

## 2021-05-15 PROCEDURE — 63600175 PHARM REV CODE 636 W HCPCS: Performed by: INTERNAL MEDICINE

## 2021-05-15 PROCEDURE — 84100 ASSAY OF PHOSPHORUS: CPT | Performed by: SURGERY

## 2021-05-15 PROCEDURE — 83735 ASSAY OF MAGNESIUM: CPT | Performed by: SURGERY

## 2021-05-15 RX ADMIN — ERAVACYCLINE 98.2 MG: 50 INJECTION, POWDER, LYOPHILIZED, FOR SOLUTION INTRAVENOUS at 03:05

## 2021-05-15 RX ADMIN — GABAPENTIN 600 MG: 300 CAPSULE ORAL at 09:05

## 2021-05-15 RX ADMIN — FUROSEMIDE 40 MG: 10 INJECTION, SOLUTION INTRAMUSCULAR; INTRAVENOUS at 06:05

## 2021-05-15 RX ADMIN — HYDRALAZINE HYDROCHLORIDE 10 MG: 10 TABLET, FILM COATED ORAL at 09:05

## 2021-05-15 RX ADMIN — ASPIRIN 325 MG ORAL TABLET 325 MG: 325 PILL ORAL at 09:05

## 2021-05-15 RX ADMIN — ACETAMINOPHEN 650 MG: 325 TABLET ORAL at 01:05

## 2021-05-15 RX ADMIN — SODIUM CHLORIDE: 0.9 INJECTION, SOLUTION INTRAVENOUS at 02:05

## 2021-05-15 RX ADMIN — METHOCARBAMOL 500 MG: 500 TABLET ORAL at 07:05

## 2021-05-15 RX ADMIN — DOCUSATE SODIUM 50MG AND SENNOSIDES 8.6MG 1 TABLET: 8.6; 5 TABLET, FILM COATED ORAL at 09:05

## 2021-05-15 RX ADMIN — OXYCODONE HYDROCHLORIDE AND ACETAMINOPHEN 1 TABLET: 5; 325 TABLET ORAL at 06:05

## 2021-05-15 RX ADMIN — NYSTATIN: 100000 POWDER TOPICAL at 09:05

## 2021-05-15 RX ADMIN — HYDROMORPHONE HYDROCHLORIDE 0.5 MG: 1 INJECTION, SOLUTION INTRAMUSCULAR; INTRAVENOUS; SUBCUTANEOUS at 01:05

## 2021-05-15 RX ADMIN — SODIUM BICARBONATE 650 MG TABLET 650 MG: at 02:05

## 2021-05-15 RX ADMIN — OXYCODONE AND ACETAMINOPHEN 1 TABLET: 10; 325 TABLET ORAL at 01:05

## 2021-05-15 RX ADMIN — DRONABINOL 2.5 MG: 2.5 CAPSULE ORAL at 09:05

## 2021-05-15 RX ADMIN — FUROSEMIDE 40 MG: 10 INJECTION, SOLUTION INTRAMUSCULAR; INTRAVENOUS at 05:05

## 2021-05-15 RX ADMIN — OXYCODONE AND ACETAMINOPHEN 1 TABLET: 10; 325 TABLET ORAL at 05:05

## 2021-05-15 RX ADMIN — HYDROMORPHONE HYDROCHLORIDE 1 MG: 1 INJECTION, SOLUTION INTRAMUSCULAR; INTRAVENOUS; SUBCUTANEOUS at 07:05

## 2021-05-15 RX ADMIN — SODIUM BICARBONATE 650 MG TABLET 650 MG: at 09:05

## 2021-05-15 RX ADMIN — CARVEDILOL 6.25 MG: 6.25 TABLET, FILM COATED ORAL at 09:05

## 2021-05-15 RX ADMIN — HYDROMORPHONE HYDROCHLORIDE 0.5 MG: 1 INJECTION, SOLUTION INTRAMUSCULAR; INTRAVENOUS; SUBCUTANEOUS at 11:05

## 2021-05-15 RX ADMIN — ALLOPURINOL 100 MG: 100 TABLET ORAL at 09:05

## 2021-05-15 RX ADMIN — HYDRALAZINE HYDROCHLORIDE 10 MG: 10 TABLET, FILM COATED ORAL at 05:05

## 2021-05-15 RX ADMIN — CYCLOBENZAPRINE HYDROCHLORIDE 5 MG: 5 TABLET, FILM COATED ORAL at 09:05

## 2021-05-15 RX ADMIN — POLYETHYLENE GLYCOL 3350 17 G: 17 POWDER, FOR SOLUTION ORAL at 09:05

## 2021-05-15 RX ADMIN — ATORVASTATIN CALCIUM 80 MG: 20 TABLET, FILM COATED ORAL at 09:05

## 2021-05-15 RX ADMIN — ERAVACYCLINE 98.2 MG: 50 INJECTION, POWDER, LYOPHILIZED, FOR SOLUTION INTRAVENOUS at 02:05

## 2021-05-15 RX ADMIN — AMITRIPTYLINE HYDROCHLORIDE 25 MG: 25 TABLET, FILM COATED ORAL at 09:05

## 2021-05-16 LAB
ALBUMIN SERPL BCP-MCNC: 1.2 G/DL (ref 3.5–5.2)
ALP SERPL-CCNC: 226 U/L (ref 55–135)
ALT SERPL W/O P-5'-P-CCNC: 16 U/L (ref 10–44)
ANION GAP SERPL CALC-SCNC: 9 MMOL/L (ref 8–16)
ANISOCYTOSIS BLD QL SMEAR: SLIGHT
AST SERPL-CCNC: 27 U/L (ref 10–40)
BASOPHILS # BLD AUTO: 0.09 K/UL (ref 0–0.2)
BASOPHILS NFR BLD: 0.3 % (ref 0–1.9)
BILIRUB SERPL-MCNC: 0.4 MG/DL (ref 0.1–1)
BUN SERPL-MCNC: 85 MG/DL (ref 6–20)
BURR CELLS BLD QL SMEAR: ABNORMAL
CALCIUM SERPL-MCNC: 7.5 MG/DL (ref 8.7–10.5)
CHLORIDE SERPL-SCNC: 108 MMOL/L (ref 95–110)
CO2 SERPL-SCNC: 17 MMOL/L (ref 23–29)
CREAT SERPL-MCNC: 2.6 MG/DL (ref 0.5–1.4)
DIFFERENTIAL METHOD: ABNORMAL
EOSINOPHIL # BLD AUTO: 0.1 K/UL (ref 0–0.5)
EOSINOPHIL NFR BLD: 0.2 % (ref 0–8)
ERYTHROCYTE [DISTWIDTH] IN BLOOD BY AUTOMATED COUNT: 17.4 % (ref 11.5–14.5)
EST. GFR  (AFRICAN AMERICAN): 23.2 ML/MIN/1.73 M^2
EST. GFR  (NON AFRICAN AMERICAN): 20.2 ML/MIN/1.73 M^2
FACT X PPP CHRO-ACNC: 0.39 IU/ML (ref 0.3–0.7)
GIANT PLATELETS BLD QL SMEAR: PRESENT
GLUCOSE SERPL-MCNC: 152 MG/DL (ref 70–110)
HCT VFR BLD AUTO: 23.9 % (ref 37–48.5)
HGB BLD-MCNC: 7.6 G/DL (ref 12–16)
HYPOCHROMIA BLD QL SMEAR: ABNORMAL
IMM GRANULOCYTES # BLD AUTO: 0.2 K/UL (ref 0–0.04)
IMM GRANULOCYTES NFR BLD AUTO: 0.7 % (ref 0–0.5)
LYMPHOCYTES # BLD AUTO: 1.3 K/UL (ref 1–4.8)
LYMPHOCYTES NFR BLD: 4.8 % (ref 18–48)
MAGNESIUM SERPL-MCNC: 1.9 MG/DL (ref 1.6–2.6)
MCH RBC QN AUTO: 29.7 PG (ref 27–31)
MCHC RBC AUTO-ENTMCNC: 31.8 G/DL (ref 32–36)
MCV RBC AUTO: 93 FL (ref 82–98)
MONOCYTES # BLD AUTO: 2.1 K/UL (ref 0.3–1)
MONOCYTES NFR BLD: 7.5 % (ref 4–15)
NEUTROPHILS # BLD AUTO: 24 K/UL (ref 1.8–7.7)
NEUTROPHILS NFR BLD: 86.5 % (ref 38–73)
NRBC BLD-RTO: 0 /100 WBC
OVALOCYTES BLD QL SMEAR: ABNORMAL
PHOSPHATE SERPL-MCNC: 6.2 MG/DL (ref 2.7–4.5)
PLATELET # BLD AUTO: 703 K/UL (ref 150–450)
PLATELET BLD QL SMEAR: ABNORMAL
PMV BLD AUTO: 10 FL (ref 9.2–12.9)
POCT GLUCOSE: 137 MG/DL (ref 70–110)
POCT GLUCOSE: 172 MG/DL (ref 70–110)
POCT GLUCOSE: 179 MG/DL (ref 70–110)
POCT GLUCOSE: 198 MG/DL (ref 70–110)
POIKILOCYTOSIS BLD QL SMEAR: ABNORMAL
POLYCHROMASIA BLD QL SMEAR: ABNORMAL
POTASSIUM SERPL-SCNC: 5 MMOL/L (ref 3.5–5.1)
PROT SERPL-MCNC: 5.4 G/DL (ref 6–8.4)
RBC # BLD AUTO: 2.56 M/UL (ref 4–5.4)
SCHISTOCYTES BLD QL SMEAR: ABNORMAL
SODIUM SERPL-SCNC: 134 MMOL/L (ref 136–145)
WBC # BLD AUTO: 27.74 K/UL (ref 3.9–12.7)

## 2021-05-16 PROCEDURE — 85520 HEPARIN ASSAY: CPT | Performed by: STUDENT IN AN ORGANIZED HEALTH CARE EDUCATION/TRAINING PROGRAM

## 2021-05-16 PROCEDURE — 63600175 PHARM REV CODE 636 W HCPCS: Performed by: STUDENT IN AN ORGANIZED HEALTH CARE EDUCATION/TRAINING PROGRAM

## 2021-05-16 PROCEDURE — 25000003 PHARM REV CODE 250: Performed by: STUDENT IN AN ORGANIZED HEALTH CARE EDUCATION/TRAINING PROGRAM

## 2021-05-16 PROCEDURE — 80053 COMPREHEN METABOLIC PANEL: CPT | Performed by: SURGERY

## 2021-05-16 PROCEDURE — 83735 ASSAY OF MAGNESIUM: CPT | Performed by: SURGERY

## 2021-05-16 PROCEDURE — 84100 ASSAY OF PHOSPHORUS: CPT | Performed by: SURGERY

## 2021-05-16 PROCEDURE — 25000003 PHARM REV CODE 250: Performed by: INTERNAL MEDICINE

## 2021-05-16 PROCEDURE — 20600001 HC STEP DOWN PRIVATE ROOM

## 2021-05-16 PROCEDURE — 63600175 PHARM REV CODE 636 W HCPCS: Performed by: INTERNAL MEDICINE

## 2021-05-16 PROCEDURE — 85025 COMPLETE CBC W/AUTO DIFF WBC: CPT | Performed by: STUDENT IN AN ORGANIZED HEALTH CARE EDUCATION/TRAINING PROGRAM

## 2021-05-16 RX ADMIN — SODIUM BICARBONATE 650 MG TABLET 650 MG: at 03:05

## 2021-05-16 RX ADMIN — ALLOPURINOL 100 MG: 100 TABLET ORAL at 09:05

## 2021-05-16 RX ADMIN — ERAVACYCLINE 98.2 MG: 50 INJECTION, POWDER, LYOPHILIZED, FOR SOLUTION INTRAVENOUS at 03:05

## 2021-05-16 RX ADMIN — METHOCARBAMOL 500 MG: 500 TABLET ORAL at 05:05

## 2021-05-16 RX ADMIN — GABAPENTIN 600 MG: 300 CAPSULE ORAL at 09:05

## 2021-05-16 RX ADMIN — FUROSEMIDE 40 MG: 10 INJECTION, SOLUTION INTRAMUSCULAR; INTRAVENOUS at 05:05

## 2021-05-16 RX ADMIN — CYCLOBENZAPRINE HYDROCHLORIDE 5 MG: 5 TABLET, FILM COATED ORAL at 09:05

## 2021-05-16 RX ADMIN — DRONABINOL 2.5 MG: 2.5 CAPSULE ORAL at 09:05

## 2021-05-16 RX ADMIN — SODIUM BICARBONATE 650 MG TABLET 650 MG: at 08:05

## 2021-05-16 RX ADMIN — ACETAMINOPHEN 650 MG: 325 TABLET ORAL at 11:05

## 2021-05-16 RX ADMIN — OXYCODONE AND ACETAMINOPHEN 1 TABLET: 10; 325 TABLET ORAL at 01:05

## 2021-05-16 RX ADMIN — SODIUM BICARBONATE 650 MG TABLET 650 MG: at 09:05

## 2021-05-16 RX ADMIN — NYSTATIN: 100000 POWDER TOPICAL at 08:05

## 2021-05-16 RX ADMIN — POLYETHYLENE GLYCOL 3350 17 G: 17 POWDER, FOR SOLUTION ORAL at 09:05

## 2021-05-16 RX ADMIN — NYSTATIN: 100000 POWDER TOPICAL at 09:05

## 2021-05-16 RX ADMIN — ASPIRIN 325 MG ORAL TABLET 325 MG: 325 PILL ORAL at 09:05

## 2021-05-16 RX ADMIN — SODIUM CHLORIDE: 0.9 INJECTION, SOLUTION INTRAVENOUS at 03:05

## 2021-05-16 RX ADMIN — HYDRALAZINE HYDROCHLORIDE 10 MG: 10 TABLET, FILM COATED ORAL at 05:05

## 2021-05-16 RX ADMIN — ACETAMINOPHEN 650 MG: 325 TABLET ORAL at 05:05

## 2021-05-16 RX ADMIN — GABAPENTIN 600 MG: 300 CAPSULE ORAL at 08:05

## 2021-05-16 RX ADMIN — GABAPENTIN 600 MG: 300 CAPSULE ORAL at 03:05

## 2021-05-16 RX ADMIN — HYDRALAZINE HYDROCHLORIDE 10 MG: 10 TABLET, FILM COATED ORAL at 01:05

## 2021-05-16 RX ADMIN — CYCLOBENZAPRINE HYDROCHLORIDE 5 MG: 5 TABLET, FILM COATED ORAL at 08:05

## 2021-05-16 RX ADMIN — DRONABINOL 2.5 MG: 2.5 CAPSULE ORAL at 08:05

## 2021-05-16 RX ADMIN — AMITRIPTYLINE HYDROCHLORIDE 25 MG: 25 TABLET, FILM COATED ORAL at 08:05

## 2021-05-16 RX ADMIN — CARVEDILOL 6.25 MG: 6.25 TABLET, FILM COATED ORAL at 08:05

## 2021-05-16 RX ADMIN — ATORVASTATIN CALCIUM 80 MG: 20 TABLET, FILM COATED ORAL at 08:05

## 2021-05-16 RX ADMIN — HYDROMORPHONE HYDROCHLORIDE 0.5 MG: 1 INJECTION, SOLUTION INTRAMUSCULAR; INTRAVENOUS; SUBCUTANEOUS at 06:05

## 2021-05-16 RX ADMIN — DOCUSATE SODIUM 50MG AND SENNOSIDES 8.6MG 1 TABLET: 8.6; 5 TABLET, FILM COATED ORAL at 09:05

## 2021-05-16 RX ADMIN — HYDRALAZINE HYDROCHLORIDE 10 MG: 10 TABLET, FILM COATED ORAL at 09:05

## 2021-05-17 LAB
ALBUMIN SERPL BCP-MCNC: 1.2 G/DL (ref 3.5–5.2)
ALP SERPL-CCNC: 250 U/L (ref 55–135)
ALT SERPL W/O P-5'-P-CCNC: 17 U/L (ref 10–44)
ANION GAP SERPL CALC-SCNC: 14 MMOL/L (ref 8–16)
ANISOCYTOSIS BLD QL SMEAR: SLIGHT
AST SERPL-CCNC: 32 U/L (ref 10–40)
BASOPHILS # BLD AUTO: 0.06 K/UL (ref 0–0.2)
BASOPHILS NFR BLD: 0.2 % (ref 0–1.9)
BILIRUB SERPL-MCNC: 0.3 MG/DL (ref 0.1–1)
BUN SERPL-MCNC: 90 MG/DL (ref 6–20)
CALCIUM SERPL-MCNC: 7.6 MG/DL (ref 8.7–10.5)
CHLORIDE SERPL-SCNC: 104 MMOL/L (ref 95–110)
CO2 SERPL-SCNC: 14 MMOL/L (ref 23–29)
CREAT SERPL-MCNC: 2.6 MG/DL (ref 0.5–1.4)
DIFFERENTIAL METHOD: ABNORMAL
EOSINOPHIL # BLD AUTO: 0.1 K/UL (ref 0–0.5)
EOSINOPHIL NFR BLD: 0.4 % (ref 0–8)
ERYTHROCYTE [DISTWIDTH] IN BLOOD BY AUTOMATED COUNT: 17.5 % (ref 11.5–14.5)
EST. GFR  (AFRICAN AMERICAN): 23.2 ML/MIN/1.73 M^2
EST. GFR  (NON AFRICAN AMERICAN): 20.2 ML/MIN/1.73 M^2
FACT X PPP CHRO-ACNC: 0.25 IU/ML (ref 0.3–0.7)
FACT X PPP CHRO-ACNC: 0.32 IU/ML (ref 0.3–0.7)
GLUCOSE SERPL-MCNC: 110 MG/DL (ref 70–110)
HCT VFR BLD AUTO: 24.7 % (ref 37–48.5)
HGB BLD-MCNC: 8 G/DL (ref 12–16)
IMM GRANULOCYTES # BLD AUTO: 0.33 K/UL (ref 0–0.04)
IMM GRANULOCYTES NFR BLD AUTO: 1.1 % (ref 0–0.5)
LYMPHOCYTES # BLD AUTO: 1.3 K/UL (ref 1–4.8)
LYMPHOCYTES NFR BLD: 4.2 % (ref 18–48)
MAGNESIUM SERPL-MCNC: 2 MG/DL (ref 1.6–2.6)
MCH RBC QN AUTO: 30 PG (ref 27–31)
MCHC RBC AUTO-ENTMCNC: 32.4 G/DL (ref 32–36)
MCV RBC AUTO: 93 FL (ref 82–98)
MONOCYTES # BLD AUTO: 2.1 K/UL (ref 0.3–1)
MONOCYTES NFR BLD: 6.8 % (ref 4–15)
NEUTROPHILS # BLD AUTO: 26.7 K/UL (ref 1.8–7.7)
NEUTROPHILS NFR BLD: 87.3 % (ref 38–73)
NRBC BLD-RTO: 0 /100 WBC
PATHOLOGIST INTERPRETATION IFE: NORMAL
PATHOLOGIST INTERPRETATION SPE: NORMAL
PHOSPHATE SERPL-MCNC: 6.3 MG/DL (ref 2.7–4.5)
PLATELET # BLD AUTO: 673 K/UL (ref 150–450)
PLATELET BLD QL SMEAR: ABNORMAL
PMV BLD AUTO: 10.4 FL (ref 9.2–12.9)
POCT GLUCOSE: 125 MG/DL (ref 70–110)
POCT GLUCOSE: 127 MG/DL (ref 70–110)
POTASSIUM SERPL-SCNC: 5 MMOL/L (ref 3.5–5.1)
PROT SERPL-MCNC: 5.8 G/DL (ref 6–8.4)
RBC # BLD AUTO: 2.67 M/UL (ref 4–5.4)
SARS-COV-2 RDRP RESP QL NAA+PROBE: NEGATIVE
SODIUM SERPL-SCNC: 132 MMOL/L (ref 136–145)
WBC # BLD AUTO: 30.57 K/UL (ref 3.9–12.7)

## 2021-05-17 PROCEDURE — U0002 COVID-19 LAB TEST NON-CDC: HCPCS | Performed by: STUDENT IN AN ORGANIZED HEALTH CARE EDUCATION/TRAINING PROGRAM

## 2021-05-17 PROCEDURE — 25000003 PHARM REV CODE 250: Performed by: STUDENT IN AN ORGANIZED HEALTH CARE EDUCATION/TRAINING PROGRAM

## 2021-05-17 PROCEDURE — 97530 THERAPEUTIC ACTIVITIES: CPT

## 2021-05-17 PROCEDURE — 25000003 PHARM REV CODE 250: Performed by: INTERNAL MEDICINE

## 2021-05-17 PROCEDURE — 63600175 PHARM REV CODE 636 W HCPCS: Performed by: STUDENT IN AN ORGANIZED HEALTH CARE EDUCATION/TRAINING PROGRAM

## 2021-05-17 PROCEDURE — 97535 SELF CARE MNGMENT TRAINING: CPT

## 2021-05-17 PROCEDURE — 84100 ASSAY OF PHOSPHORUS: CPT | Performed by: SURGERY

## 2021-05-17 PROCEDURE — 99024 POSTOP FOLLOW-UP VISIT: CPT | Mod: POP,,, | Performed by: SURGERY

## 2021-05-17 PROCEDURE — 99024 PR POST-OP FOLLOW-UP VISIT: ICD-10-PCS | Mod: POP,,, | Performed by: SURGERY

## 2021-05-17 PROCEDURE — 80053 COMPREHEN METABOLIC PANEL: CPT | Performed by: SURGERY

## 2021-05-17 PROCEDURE — 85025 COMPLETE CBC W/AUTO DIFF WBC: CPT | Performed by: STUDENT IN AN ORGANIZED HEALTH CARE EDUCATION/TRAINING PROGRAM

## 2021-05-17 PROCEDURE — 63600175 PHARM REV CODE 636 W HCPCS: Performed by: INTERNAL MEDICINE

## 2021-05-17 PROCEDURE — 83735 ASSAY OF MAGNESIUM: CPT | Performed by: SURGERY

## 2021-05-17 PROCEDURE — 20600001 HC STEP DOWN PRIVATE ROOM

## 2021-05-17 PROCEDURE — 85520 HEPARIN ASSAY: CPT | Performed by: SURGERY

## 2021-05-17 PROCEDURE — 85520 HEPARIN ASSAY: CPT | Mod: 91 | Performed by: STUDENT IN AN ORGANIZED HEALTH CARE EDUCATION/TRAINING PROGRAM

## 2021-05-17 RX ORDER — SODIUM BICARBONATE 650 MG/1
1300 TABLET ORAL 3 TIMES DAILY
Status: DISCONTINUED | OUTPATIENT
Start: 2021-05-17 | End: 2021-05-20

## 2021-05-17 RX ADMIN — POLYETHYLENE GLYCOL 3350 17 G: 17 POWDER, FOR SOLUTION ORAL at 08:05

## 2021-05-17 RX ADMIN — SODIUM BICARBONATE 650 MG TABLET 650 MG: at 02:05

## 2021-05-17 RX ADMIN — FUROSEMIDE 40 MG: 10 INJECTION, SOLUTION INTRAMUSCULAR; INTRAVENOUS at 05:05

## 2021-05-17 RX ADMIN — ERAVACYCLINE 98.2 MG: 50 INJECTION, POWDER, LYOPHILIZED, FOR SOLUTION INTRAVENOUS at 04:05

## 2021-05-17 RX ADMIN — DOCUSATE SODIUM 50MG AND SENNOSIDES 8.6MG 1 TABLET: 8.6; 5 TABLET, FILM COATED ORAL at 08:05

## 2021-05-17 RX ADMIN — CYCLOBENZAPRINE HYDROCHLORIDE 5 MG: 5 TABLET, FILM COATED ORAL at 10:05

## 2021-05-17 RX ADMIN — HYDRALAZINE HYDROCHLORIDE 10 MG: 10 TABLET, FILM COATED ORAL at 01:05

## 2021-05-17 RX ADMIN — AMITRIPTYLINE HYDROCHLORIDE 25 MG: 25 TABLET, FILM COATED ORAL at 10:05

## 2021-05-17 RX ADMIN — HYDRALAZINE HYDROCHLORIDE 10 MG: 10 TABLET, FILM COATED ORAL at 05:05

## 2021-05-17 RX ADMIN — GABAPENTIN 600 MG: 300 CAPSULE ORAL at 10:05

## 2021-05-17 RX ADMIN — HYDROMORPHONE HYDROCHLORIDE 0.5 MG: 1 INJECTION, SOLUTION INTRAMUSCULAR; INTRAVENOUS; SUBCUTANEOUS at 01:05

## 2021-05-17 RX ADMIN — ALLOPURINOL 100 MG: 100 TABLET ORAL at 08:05

## 2021-05-17 RX ADMIN — ATORVASTATIN CALCIUM 80 MG: 20 TABLET, FILM COATED ORAL at 10:05

## 2021-05-17 RX ADMIN — OXYCODONE AND ACETAMINOPHEN 1 TABLET: 10; 325 TABLET ORAL at 12:05

## 2021-05-17 RX ADMIN — SODIUM BICARBONATE 650 MG TABLET 1300 MG: at 03:05

## 2021-05-17 RX ADMIN — ASPIRIN 325 MG ORAL TABLET 325 MG: 325 PILL ORAL at 08:05

## 2021-05-17 RX ADMIN — DRONABINOL 2.5 MG: 2.5 CAPSULE ORAL at 10:05

## 2021-05-17 RX ADMIN — HYDROMORPHONE HYDROCHLORIDE 0.5 MG: 1 INJECTION, SOLUTION INTRAMUSCULAR; INTRAVENOUS; SUBCUTANEOUS at 08:05

## 2021-05-17 RX ADMIN — NYSTATIN: 100000 POWDER TOPICAL at 10:05

## 2021-05-17 RX ADMIN — SODIUM BICARBONATE 650 MG TABLET 650 MG: at 08:05

## 2021-05-17 RX ADMIN — SODIUM BICARBONATE 650 MG TABLET 1300 MG: at 10:05

## 2021-05-17 RX ADMIN — GABAPENTIN 600 MG: 300 CAPSULE ORAL at 08:05

## 2021-05-17 RX ADMIN — ERAVACYCLINE 98.2 MG: 50 INJECTION, POWDER, LYOPHILIZED, FOR SOLUTION INTRAVENOUS at 02:05

## 2021-05-17 RX ADMIN — HEPARIN SODIUM AND DEXTROSE 7.01 UNITS/KG/HR: 10000; 5 INJECTION INTRAVENOUS at 05:05

## 2021-05-17 RX ADMIN — ACETAMINOPHEN 650 MG: 325 TABLET ORAL at 05:05

## 2021-05-17 RX ADMIN — ACETAMINOPHEN 650 MG: 325 TABLET ORAL at 12:05

## 2021-05-17 RX ADMIN — CYCLOBENZAPRINE HYDROCHLORIDE 5 MG: 5 TABLET, FILM COATED ORAL at 08:05

## 2021-05-17 RX ADMIN — OXYCODONE AND ACETAMINOPHEN 1 TABLET: 10; 325 TABLET ORAL at 07:05

## 2021-05-17 RX ADMIN — DRONABINOL 2.5 MG: 2.5 CAPSULE ORAL at 08:05

## 2021-05-18 LAB
ABO + RH BLD: NORMAL
ALBUMIN SERPL BCP-MCNC: 1 G/DL (ref 3.5–5.2)
ALBUMIN SERPL BCP-MCNC: 1.1 G/DL (ref 3.5–5.2)
ALP SERPL-CCNC: 265 U/L (ref 55–135)
ALP SERPL-CCNC: 304 U/L (ref 55–135)
ALT SERPL W/O P-5'-P-CCNC: 17 U/L (ref 10–44)
ALT SERPL W/O P-5'-P-CCNC: 18 U/L (ref 10–44)
AMORPH CRY UR QL COMP ASSIST: ABNORMAL
ANION GAP SERPL CALC-SCNC: 10 MMOL/L (ref 8–16)
ANION GAP SERPL CALC-SCNC: 11 MMOL/L (ref 8–16)
ANION GAP SERPL CALC-SCNC: 9 MMOL/L (ref 8–16)
ANISOCYTOSIS BLD QL SMEAR: SLIGHT
ANISOCYTOSIS BLD QL SMEAR: SLIGHT
APTT BLDCRRT: 39.6 SEC (ref 21–32)
AST SERPL-CCNC: 29 U/L (ref 10–40)
AST SERPL-CCNC: 35 U/L (ref 10–40)
BACTERIA #/AREA URNS AUTO: ABNORMAL /HPF
BASO STIPL BLD QL SMEAR: ABNORMAL
BASO STIPL BLD QL SMEAR: ABNORMAL
BASOPHILS # BLD AUTO: 0.03 K/UL (ref 0–0.2)
BASOPHILS # BLD AUTO: 0.04 K/UL (ref 0–0.2)
BASOPHILS # BLD AUTO: 0.07 K/UL (ref 0–0.2)
BASOPHILS NFR BLD: 0.1 % (ref 0–1.9)
BASOPHILS NFR BLD: 0.2 % (ref 0–1.9)
BASOPHILS NFR BLD: 0.2 % (ref 0–1.9)
BILIRUB SERPL-MCNC: 0.4 MG/DL (ref 0.1–1)
BILIRUB SERPL-MCNC: 0.6 MG/DL (ref 0.1–1)
BILIRUB UR QL STRIP: NEGATIVE
BLD GP AB SCN CELLS X3 SERPL QL: NORMAL
BLD PROD TYP BPU: NORMAL
BLD PROD TYP BPU: NORMAL
BLOOD UNIT EXPIRATION DATE: NORMAL
BLOOD UNIT EXPIRATION DATE: NORMAL
BLOOD UNIT TYPE CODE: 5100
BLOOD UNIT TYPE CODE: 5100
BLOOD UNIT TYPE: NORMAL
BLOOD UNIT TYPE: NORMAL
BUN SERPL-MCNC: 89 MG/DL (ref 6–20)
BUN SERPL-MCNC: 89 MG/DL (ref 6–20)
BUN SERPL-MCNC: 96 MG/DL (ref 6–20)
BURR CELLS BLD QL SMEAR: ABNORMAL
BURR CELLS BLD QL SMEAR: ABNORMAL
CA-I BLDV-SCNC: 1.13 MMOL/L (ref 1.06–1.42)
CALCIUM SERPL-MCNC: 7.1 MG/DL (ref 8.7–10.5)
CALCIUM SERPL-MCNC: 7.2 MG/DL (ref 8.7–10.5)
CALCIUM SERPL-MCNC: 7.6 MG/DL (ref 8.7–10.5)
CHLORIDE SERPL-SCNC: 106 MMOL/L (ref 95–110)
CHLORIDE SERPL-SCNC: 106 MMOL/L (ref 95–110)
CHLORIDE SERPL-SCNC: 107 MMOL/L (ref 95–110)
CLARITY UR REFRACT.AUTO: ABNORMAL
CO2 SERPL-SCNC: 15 MMOL/L (ref 23–29)
CO2 SERPL-SCNC: 16 MMOL/L (ref 23–29)
CO2 SERPL-SCNC: 17 MMOL/L (ref 23–29)
CODING SYSTEM: NORMAL
CODING SYSTEM: NORMAL
COLOR UR AUTO: YELLOW
CREAT SERPL-MCNC: 2.5 MG/DL (ref 0.5–1.4)
CREAT SERPL-MCNC: 2.6 MG/DL (ref 0.5–1.4)
CREAT SERPL-MCNC: 2.8 MG/DL (ref 0.5–1.4)
DIFFERENTIAL METHOD: ABNORMAL
DISPENSE STATUS: NORMAL
DISPENSE STATUS: NORMAL
EOSINOPHIL # BLD AUTO: 0 K/UL (ref 0–0.5)
EOSINOPHIL # BLD AUTO: 0 K/UL (ref 0–0.5)
EOSINOPHIL # BLD AUTO: 0.1 K/UL (ref 0–0.5)
EOSINOPHIL NFR BLD: 0 % (ref 0–8)
EOSINOPHIL NFR BLD: 0 % (ref 0–8)
EOSINOPHIL NFR BLD: 0.2 % (ref 0–8)
ERYTHROCYTE [DISTWIDTH] IN BLOOD BY AUTOMATED COUNT: 17.1 % (ref 11.5–14.5)
ERYTHROCYTE [DISTWIDTH] IN BLOOD BY AUTOMATED COUNT: 17.3 % (ref 11.5–14.5)
ERYTHROCYTE [DISTWIDTH] IN BLOOD BY AUTOMATED COUNT: 17.4 % (ref 11.5–14.5)
EST. GFR  (AFRICAN AMERICAN): 21.3 ML/MIN/1.73 M^2
EST. GFR  (AFRICAN AMERICAN): 23.2 ML/MIN/1.73 M^2
EST. GFR  (AFRICAN AMERICAN): 24.4 ML/MIN/1.73 M^2
EST. GFR  (NON AFRICAN AMERICAN): 18.4 ML/MIN/1.73 M^2
EST. GFR  (NON AFRICAN AMERICAN): 20.2 ML/MIN/1.73 M^2
EST. GFR  (NON AFRICAN AMERICAN): 21.1 ML/MIN/1.73 M^2
FIBRINOGEN PPP-MCNC: 402 MG/DL (ref 182–400)
GIANT PLATELETS BLD QL SMEAR: PRESENT
GIANT PLATELETS BLD QL SMEAR: PRESENT
GLUCOSE SERPL-MCNC: 101 MG/DL (ref 70–110)
GLUCOSE SERPL-MCNC: 76 MG/DL (ref 70–110)
GLUCOSE SERPL-MCNC: 83 MG/DL (ref 70–110)
GLUCOSE UR QL STRIP: NEGATIVE
HCT VFR BLD AUTO: 22.1 % (ref 37–48.5)
HCT VFR BLD AUTO: 23 % (ref 37–48.5)
HCT VFR BLD AUTO: 28.2 % (ref 37–48.5)
HGB BLD-MCNC: 7.3 G/DL (ref 12–16)
HGB BLD-MCNC: 7.4 G/DL (ref 12–16)
HGB BLD-MCNC: 9.2 G/DL (ref 12–16)
HGB UR QL STRIP: ABNORMAL
HYALINE CASTS UR QL AUTO: 30 /LPF
HYPOCHROMIA BLD QL SMEAR: ABNORMAL
HYPOCHROMIA BLD QL SMEAR: ABNORMAL
IMM GRANULOCYTES # BLD AUTO: 0.25 K/UL (ref 0–0.04)
IMM GRANULOCYTES # BLD AUTO: 0.27 K/UL (ref 0–0.04)
IMM GRANULOCYTES # BLD AUTO: 0.88 K/UL (ref 0–0.04)
IMM GRANULOCYTES NFR BLD AUTO: 1 % (ref 0–0.5)
IMM GRANULOCYTES NFR BLD AUTO: 1.1 % (ref 0–0.5)
IMM GRANULOCYTES NFR BLD AUTO: 2.9 % (ref 0–0.5)
INR PPP: 1.2 (ref 0.8–1.2)
KETONES UR QL STRIP: NEGATIVE
LEUKOCYTE ESTERASE UR QL STRIP: ABNORMAL
LYMPHOCYTES # BLD AUTO: 0.7 K/UL (ref 1–4.8)
LYMPHOCYTES # BLD AUTO: 0.7 K/UL (ref 1–4.8)
LYMPHOCYTES # BLD AUTO: 1.1 K/UL (ref 1–4.8)
LYMPHOCYTES NFR BLD: 2.6 % (ref 18–48)
LYMPHOCYTES NFR BLD: 3 % (ref 18–48)
LYMPHOCYTES NFR BLD: 3.5 % (ref 18–48)
MAGNESIUM SERPL-MCNC: 2 MG/DL (ref 1.6–2.6)
MAGNESIUM SERPL-MCNC: 2.1 MG/DL (ref 1.6–2.6)
MCH RBC QN AUTO: 29.5 PG (ref 27–31)
MCH RBC QN AUTO: 29.7 PG (ref 27–31)
MCH RBC QN AUTO: 30.9 PG (ref 27–31)
MCHC RBC AUTO-ENTMCNC: 32.2 G/DL (ref 32–36)
MCHC RBC AUTO-ENTMCNC: 32.6 G/DL (ref 32–36)
MCHC RBC AUTO-ENTMCNC: 33 G/DL (ref 32–36)
MCV RBC AUTO: 90 FL (ref 82–98)
MCV RBC AUTO: 92 FL (ref 82–98)
MCV RBC AUTO: 94 FL (ref 82–98)
MICROSCOPIC COMMENT: ABNORMAL
MONOCYTES # BLD AUTO: 1 K/UL (ref 0.3–1)
MONOCYTES # BLD AUTO: 1.1 K/UL (ref 0.3–1)
MONOCYTES # BLD AUTO: 2 K/UL (ref 0.3–1)
MONOCYTES NFR BLD: 4.3 % (ref 4–15)
MONOCYTES NFR BLD: 4.5 % (ref 4–15)
MONOCYTES NFR BLD: 6.4 % (ref 4–15)
NEUTROPHILS # BLD AUTO: 20.2 K/UL (ref 1.8–7.7)
NEUTROPHILS # BLD AUTO: 23.9 K/UL (ref 1.8–7.7)
NEUTROPHILS # BLD AUTO: 26.7 K/UL (ref 1.8–7.7)
NEUTROPHILS NFR BLD: 86.8 % (ref 38–73)
NEUTROPHILS NFR BLD: 91.3 % (ref 38–73)
NEUTROPHILS NFR BLD: 91.9 % (ref 38–73)
NITRITE UR QL STRIP: POSITIVE
NRBC BLD-RTO: 0 /100 WBC
OVALOCYTES BLD QL SMEAR: ABNORMAL
OVALOCYTES BLD QL SMEAR: ABNORMAL
PH UR STRIP: 5 [PH] (ref 5–8)
PHOSPHATE SERPL-MCNC: 6.6 MG/DL (ref 2.7–4.5)
PHOSPHATE SERPL-MCNC: 6.7 MG/DL (ref 2.7–4.5)
PLATELET # BLD AUTO: 393 K/UL (ref 150–450)
PLATELET # BLD AUTO: 433 K/UL (ref 150–450)
PLATELET # BLD AUTO: 557 K/UL (ref 150–450)
PLATELET BLD QL SMEAR: ABNORMAL
PMV BLD AUTO: 10.4 FL (ref 9.2–12.9)
PMV BLD AUTO: 10.5 FL (ref 9.2–12.9)
PMV BLD AUTO: 10.7 FL (ref 9.2–12.9)
POCT GLUCOSE: 102 MG/DL (ref 70–110)
POCT GLUCOSE: 104 MG/DL (ref 70–110)
POCT GLUCOSE: 104 MG/DL (ref 70–110)
POCT GLUCOSE: 72 MG/DL (ref 70–110)
POCT GLUCOSE: 76 MG/DL (ref 70–110)
POCT GLUCOSE: 89 MG/DL (ref 70–110)
POCT GLUCOSE: 96 MG/DL (ref 70–110)
POIKILOCYTOSIS BLD QL SMEAR: SLIGHT
POIKILOCYTOSIS BLD QL SMEAR: SLIGHT
POLYCHROMASIA BLD QL SMEAR: ABNORMAL
POLYCHROMASIA BLD QL SMEAR: ABNORMAL
POTASSIUM SERPL-SCNC: 4.9 MMOL/L (ref 3.5–5.1)
POTASSIUM SERPL-SCNC: 5 MMOL/L (ref 3.5–5.1)
POTASSIUM SERPL-SCNC: 5 MMOL/L (ref 3.5–5.1)
PROT SERPL-MCNC: 4.6 G/DL (ref 6–8.4)
PROT SERPL-MCNC: 5.1 G/DL (ref 6–8.4)
PROT UR QL STRIP: ABNORMAL
PROTHROMBIN TIME: 13 SEC (ref 9–12.5)
RBC # BLD AUTO: 2.36 M/UL (ref 4–5.4)
RBC # BLD AUTO: 2.49 M/UL (ref 4–5.4)
RBC # BLD AUTO: 3.12 M/UL (ref 4–5.4)
RBC #/AREA URNS AUTO: 25 /HPF (ref 0–4)
SCHISTOCYTES BLD QL SMEAR: ABNORMAL
SCHISTOCYTES BLD QL SMEAR: ABNORMAL
SMUDGE CELLS BLD QL SMEAR: PRESENT
SMUDGE CELLS BLD QL SMEAR: PRESENT
SODIUM SERPL-SCNC: 132 MMOL/L (ref 136–145)
SODIUM SERPL-SCNC: 132 MMOL/L (ref 136–145)
SODIUM SERPL-SCNC: 133 MMOL/L (ref 136–145)
SP GR UR STRIP: 1.01 (ref 1–1.03)
SQUAMOUS #/AREA URNS AUTO: 1 /HPF
TOXIC GRANULES BLD QL SMEAR: ABNORMAL
TRANS ERYTHROCYTES VOL PATIENT: NORMAL ML
TRANS ERYTHROCYTES VOL PATIENT: NORMAL ML
URN SPEC COLLECT METH UR: ABNORMAL
WBC # BLD AUTO: 22.17 K/UL (ref 3.9–12.7)
WBC # BLD AUTO: 25.96 K/UL (ref 3.9–12.7)
WBC # BLD AUTO: 30.71 K/UL (ref 3.9–12.7)
WBC #/AREA URNS AUTO: >100 /HPF (ref 0–5)
WBC CLUMPS UR QL AUTO: ABNORMAL
WBC TOXIC VACUOLES BLD QL SMEAR: PRESENT
WBC TOXIC VACUOLES BLD QL SMEAR: PRESENT
YEAST UR QL AUTO: ABNORMAL

## 2021-05-18 PROCEDURE — 25000003 PHARM REV CODE 250: Performed by: ANESTHESIOLOGY

## 2021-05-18 PROCEDURE — 25000003 PHARM REV CODE 250: Performed by: STUDENT IN AN ORGANIZED HEALTH CARE EDUCATION/TRAINING PROGRAM

## 2021-05-18 PROCEDURE — 63600175 PHARM REV CODE 636 W HCPCS: Performed by: STUDENT IN AN ORGANIZED HEALTH CARE EDUCATION/TRAINING PROGRAM

## 2021-05-18 PROCEDURE — 63600175 PHARM REV CODE 636 W HCPCS: Performed by: INTERNAL MEDICINE

## 2021-05-18 PROCEDURE — 80053 COMPREHEN METABOLIC PANEL: CPT | Performed by: SURGERY

## 2021-05-18 PROCEDURE — 81001 URINALYSIS AUTO W/SCOPE: CPT | Performed by: SURGERY

## 2021-05-18 PROCEDURE — 84100 ASSAY OF PHOSPHORUS: CPT | Performed by: SURGERY

## 2021-05-18 PROCEDURE — 36000711: Performed by: SURGERY

## 2021-05-18 PROCEDURE — D9220A PRA ANESTHESIA: ICD-10-PCS | Mod: ,,, | Performed by: STUDENT IN AN ORGANIZED HEALTH CARE EDUCATION/TRAINING PROGRAM

## 2021-05-18 PROCEDURE — 27200750 HC INSULATED NEEDLE/ STIMUPLEX: Performed by: STUDENT IN AN ORGANIZED HEALTH CARE EDUCATION/TRAINING PROGRAM

## 2021-05-18 PROCEDURE — 36620 INSERTION CATHETER ARTERY: CPT | Performed by: STUDENT IN AN ORGANIZED HEALTH CARE EDUCATION/TRAINING PROGRAM

## 2021-05-18 PROCEDURE — 64446 NJX AA&/STRD SC NRV NFS IMG: CPT | Performed by: STUDENT IN AN ORGANIZED HEALTH CARE EDUCATION/TRAINING PROGRAM

## 2021-05-18 PROCEDURE — 83735 ASSAY OF MAGNESIUM: CPT | Mod: 91 | Performed by: STUDENT IN AN ORGANIZED HEALTH CARE EDUCATION/TRAINING PROGRAM

## 2021-05-18 PROCEDURE — 80053 COMPREHEN METABOLIC PANEL: CPT | Mod: 91 | Performed by: STUDENT IN AN ORGANIZED HEALTH CARE EDUCATION/TRAINING PROGRAM

## 2021-05-18 PROCEDURE — 27201423 OPTIME MED/SURG SUP & DEVICES STERILE SUPPLY: Performed by: SURGERY

## 2021-05-18 PROCEDURE — 27590 PR AMPUTATE THIGH,THRU FEMUR: ICD-10-PCS | Mod: 58,LT,GC, | Performed by: SURGERY

## 2021-05-18 PROCEDURE — 76942 ECHO GUIDE FOR BIOPSY: CPT | Mod: 26,,, | Performed by: ANESTHESIOLOGY

## 2021-05-18 PROCEDURE — 85025 COMPLETE CBC W/AUTO DIFF WBC: CPT | Mod: 91 | Performed by: STUDENT IN AN ORGANIZED HEALTH CARE EDUCATION/TRAINING PROGRAM

## 2021-05-18 PROCEDURE — 87086 URINE CULTURE/COLONY COUNT: CPT | Performed by: SURGERY

## 2021-05-18 PROCEDURE — 25000003 PHARM REV CODE 250: Performed by: INTERNAL MEDICINE

## 2021-05-18 PROCEDURE — 71000033 HC RECOVERY, INTIAL HOUR: Performed by: SURGERY

## 2021-05-18 PROCEDURE — 85025 COMPLETE CBC W/AUTO DIFF WBC: CPT | Mod: 91 | Performed by: SURGERY

## 2021-05-18 PROCEDURE — 99291 CRITICAL CARE FIRST HOUR: CPT | Mod: GC,,, | Performed by: ANESTHESIOLOGY

## 2021-05-18 PROCEDURE — C1751 CATH, INF, PER/CENT/MIDLINE: HCPCS | Performed by: STUDENT IN AN ORGANIZED HEALTH CARE EDUCATION/TRAINING PROGRAM

## 2021-05-18 PROCEDURE — 84100 ASSAY OF PHOSPHORUS: CPT | Mod: 91 | Performed by: STUDENT IN AN ORGANIZED HEALTH CARE EDUCATION/TRAINING PROGRAM

## 2021-05-18 PROCEDURE — 76942 ECHO GUIDE FOR BIOPSY: CPT | Performed by: STUDENT IN AN ORGANIZED HEALTH CARE EDUCATION/TRAINING PROGRAM

## 2021-05-18 PROCEDURE — 64446 NJX AA&/STRD SC NRV NFS IMG: CPT | Mod: 59,LT,, | Performed by: ANESTHESIOLOGY

## 2021-05-18 PROCEDURE — 27590 AMPUTATE LEG AT THIGH: CPT | Mod: 58,LT,GC, | Performed by: SURGERY

## 2021-05-18 PROCEDURE — 87040 BLOOD CULTURE FOR BACTERIA: CPT | Performed by: STUDENT IN AN ORGANIZED HEALTH CARE EDUCATION/TRAINING PROGRAM

## 2021-05-18 PROCEDURE — 64447 NJX AA&/STRD FEMORAL NRV IMG: CPT | Mod: 59,LT,, | Performed by: ANESTHESIOLOGY

## 2021-05-18 PROCEDURE — 20000000 HC ICU ROOM

## 2021-05-18 PROCEDURE — 83735 ASSAY OF MAGNESIUM: CPT | Performed by: SURGERY

## 2021-05-18 PROCEDURE — 99232 PR SUBSEQUENT HOSPITAL CARE,LEVL II: ICD-10-PCS | Mod: GC,,, | Performed by: INTERNAL MEDICINE

## 2021-05-18 PROCEDURE — 99291 PR CRITICAL CARE, E/M 30-74 MINUTES: ICD-10-PCS | Mod: GC,,, | Performed by: ANESTHESIOLOGY

## 2021-05-18 PROCEDURE — 87077 CULTURE AEROBIC IDENTIFY: CPT | Performed by: SURGERY

## 2021-05-18 PROCEDURE — 88307 TISSUE EXAM BY PATHOLOGIST: CPT | Mod: 26,,, | Performed by: PATHOLOGY

## 2021-05-18 PROCEDURE — P9021 RED BLOOD CELLS UNIT: HCPCS | Performed by: STUDENT IN AN ORGANIZED HEALTH CARE EDUCATION/TRAINING PROGRAM

## 2021-05-18 PROCEDURE — 86920 COMPATIBILITY TEST SPIN: CPT | Performed by: STUDENT IN AN ORGANIZED HEALTH CARE EDUCATION/TRAINING PROGRAM

## 2021-05-18 PROCEDURE — 87088 URINE BACTERIA CULTURE: CPT | Performed by: SURGERY

## 2021-05-18 PROCEDURE — 82962 GLUCOSE BLOOD TEST: CPT | Performed by: SURGERY

## 2021-05-18 PROCEDURE — 36000710: Performed by: SURGERY

## 2021-05-18 PROCEDURE — 64447 FEMORAL NERVE SINGLE INJECTION BLOCK: ICD-10-PCS | Mod: 59,LT,, | Performed by: ANESTHESIOLOGY

## 2021-05-18 PROCEDURE — 88307 PR  SURG PATH,LEVEL V: ICD-10-PCS | Mod: 26,,, | Performed by: PATHOLOGY

## 2021-05-18 PROCEDURE — 76942 FEMORAL NERVE SINGLE INJECTION BLOCK: ICD-10-PCS | Mod: 26,,, | Performed by: ANESTHESIOLOGY

## 2021-05-18 PROCEDURE — 63600175 PHARM REV CODE 636 W HCPCS: Performed by: SURGERY

## 2021-05-18 PROCEDURE — 71000039 HC RECOVERY, EACH ADD'L HOUR: Performed by: SURGERY

## 2021-05-18 PROCEDURE — 37000008 HC ANESTHESIA 1ST 15 MINUTES: Performed by: SURGERY

## 2021-05-18 PROCEDURE — 88307 TISSUE EXAM BY PATHOLOGIST: CPT | Performed by: PATHOLOGY

## 2021-05-18 PROCEDURE — 37000009 HC ANESTHESIA EA ADD 15 MINS: Performed by: SURGERY

## 2021-05-18 PROCEDURE — 36620 PR INSERT CATH,ART,PERCUT,SHORTTERM: ICD-10-PCS | Mod: 59,,, | Performed by: STUDENT IN AN ORGANIZED HEALTH CARE EDUCATION/TRAINING PROGRAM

## 2021-05-18 PROCEDURE — 99232 SBSQ HOSP IP/OBS MODERATE 35: CPT | Mod: GC,,, | Performed by: INTERNAL MEDICINE

## 2021-05-18 PROCEDURE — 86900 BLOOD TYPING SEROLOGIC ABO: CPT | Performed by: STUDENT IN AN ORGANIZED HEALTH CARE EDUCATION/TRAINING PROGRAM

## 2021-05-18 PROCEDURE — 36620 INSERTION CATHETER ARTERY: CPT | Mod: 59,,, | Performed by: STUDENT IN AN ORGANIZED HEALTH CARE EDUCATION/TRAINING PROGRAM

## 2021-05-18 PROCEDURE — 85730 THROMBOPLASTIN TIME PARTIAL: CPT | Performed by: STUDENT IN AN ORGANIZED HEALTH CARE EDUCATION/TRAINING PROGRAM

## 2021-05-18 PROCEDURE — 64446 SCIATIC NERVE CATHETER: ICD-10-PCS | Mod: 59,LT,, | Performed by: ANESTHESIOLOGY

## 2021-05-18 PROCEDURE — 80048 BASIC METABOLIC PNL TOTAL CA: CPT | Performed by: SURGERY

## 2021-05-18 PROCEDURE — 87186 SC STD MICRODIL/AGAR DIL: CPT | Performed by: SURGERY

## 2021-05-18 PROCEDURE — 82330 ASSAY OF CALCIUM: CPT | Performed by: SURGERY

## 2021-05-18 PROCEDURE — 85384 FIBRINOGEN ACTIVITY: CPT | Performed by: STUDENT IN AN ORGANIZED HEALTH CARE EDUCATION/TRAINING PROGRAM

## 2021-05-18 PROCEDURE — D9220A PRA ANESTHESIA: Mod: ,,, | Performed by: STUDENT IN AN ORGANIZED HEALTH CARE EDUCATION/TRAINING PROGRAM

## 2021-05-18 PROCEDURE — 85610 PROTHROMBIN TIME: CPT | Performed by: STUDENT IN AN ORGANIZED HEALTH CARE EDUCATION/TRAINING PROGRAM

## 2021-05-18 PROCEDURE — 94761 N-INVAS EAR/PLS OXIMETRY MLT: CPT

## 2021-05-18 PROCEDURE — 64447 NJX AA&/STRD FEMORAL NRV IMG: CPT | Performed by: STUDENT IN AN ORGANIZED HEALTH CARE EDUCATION/TRAINING PROGRAM

## 2021-05-18 PROCEDURE — 63600175 PHARM REV CODE 636 W HCPCS: Performed by: ANESTHESIOLOGY

## 2021-05-18 RX ORDER — BUPIVACAINE HYDROCHLORIDE AND EPINEPHRINE 5; 5 MG/ML; UG/ML
INJECTION, SOLUTION EPIDURAL; INTRACAUDAL; PERINEURAL
Status: COMPLETED | OUTPATIENT
Start: 2021-05-18 | End: 2021-05-18

## 2021-05-18 RX ORDER — SODIUM CHLORIDE 0.9 % (FLUSH) 0.9 %
10 SYRINGE (ML) INJECTION
Status: DISCONTINUED | OUTPATIENT
Start: 2021-05-18 | End: 2021-05-28

## 2021-05-18 RX ORDER — GLUCAGON 1 MG
1 KIT INJECTION
Status: DISCONTINUED | OUTPATIENT
Start: 2021-05-18 | End: 2021-05-19

## 2021-05-18 RX ORDER — VASOPRESSIN 20 [USP'U]/ML
INJECTION, SOLUTION INTRAMUSCULAR; SUBCUTANEOUS
Status: DISCONTINUED | OUTPATIENT
Start: 2021-05-18 | End: 2021-05-18

## 2021-05-18 RX ORDER — NOREPINEPHRINE BITARTRATE/D5W 4MG/250ML
0-3 PLASTIC BAG, INJECTION (ML) INTRAVENOUS CONTINUOUS
Status: DISCONTINUED | OUTPATIENT
Start: 2021-05-18 | End: 2021-05-20

## 2021-05-18 RX ORDER — ONDANSETRON 2 MG/ML
INJECTION INTRAMUSCULAR; INTRAVENOUS
Status: DISCONTINUED | OUTPATIENT
Start: 2021-05-18 | End: 2021-05-18

## 2021-05-18 RX ORDER — OXYCODONE HYDROCHLORIDE 5 MG/1
10 TABLET ORAL EVERY 4 HOURS PRN
Status: DISCONTINUED | OUTPATIENT
Start: 2021-05-18 | End: 2021-05-18

## 2021-05-18 RX ORDER — ROPIVACAINE HYDROCHLORIDE 2 MG/ML
8 INJECTION, SOLUTION EPIDURAL; INFILTRATION; PERINEURAL CONTINUOUS
Status: DISCONTINUED | OUTPATIENT
Start: 2021-05-18 | End: 2021-05-24

## 2021-05-18 RX ORDER — GABAPENTIN 300 MG/1
600 CAPSULE ORAL 3 TIMES DAILY
Status: DISCONTINUED | OUTPATIENT
Start: 2021-05-19 | End: 2021-05-20

## 2021-05-18 RX ORDER — MEROPENEM 1 G/1
1 INJECTION, POWDER, FOR SOLUTION INTRAVENOUS
Status: DISCONTINUED | OUTPATIENT
Start: 2021-05-18 | End: 2021-05-18

## 2021-05-18 RX ORDER — MEPERIDINE HYDROCHLORIDE 50 MG/ML
12.5 INJECTION INTRAMUSCULAR; INTRAVENOUS; SUBCUTANEOUS ONCE AS NEEDED
Status: DISCONTINUED | OUTPATIENT
Start: 2021-05-18 | End: 2021-05-18 | Stop reason: HOSPADM

## 2021-05-18 RX ORDER — PHENYLEPHRINE HCL IN 0.9% NACL 1 MG/10 ML
SYRINGE (ML) INTRAVENOUS
Status: DISCONTINUED | OUTPATIENT
Start: 2021-05-18 | End: 2021-05-18

## 2021-05-18 RX ORDER — MEROPENEM 1 G/1
1 INJECTION, POWDER, FOR SOLUTION INTRAVENOUS
Status: DISCONTINUED | OUTPATIENT
Start: 2021-05-18 | End: 2021-05-21

## 2021-05-18 RX ORDER — ATORVASTATIN CALCIUM 20 MG/1
80 TABLET, FILM COATED ORAL NIGHTLY
Status: DISCONTINUED | OUTPATIENT
Start: 2021-05-19 | End: 2021-06-08 | Stop reason: HOSPADM

## 2021-05-18 RX ORDER — BUPIVACAINE HYDROCHLORIDE AND EPINEPHRINE 2.5; 5 MG/ML; UG/ML
INJECTION, SOLUTION EPIDURAL; INFILTRATION; INTRACAUDAL; PERINEURAL
Status: COMPLETED | OUTPATIENT
Start: 2021-05-18 | End: 2021-05-18

## 2021-05-18 RX ORDER — HYDROCODONE BITARTRATE AND ACETAMINOPHEN 500; 5 MG/1; MG/1
TABLET ORAL
Status: DISCONTINUED | OUTPATIENT
Start: 2021-05-18 | End: 2021-05-21

## 2021-05-18 RX ORDER — FLUCONAZOLE 2 MG/ML
400 INJECTION, SOLUTION INTRAVENOUS
Status: DISCONTINUED | OUTPATIENT
Start: 2021-05-18 | End: 2021-05-20

## 2021-05-18 RX ORDER — OXYCODONE HYDROCHLORIDE 5 MG/1
5 TABLET ORAL
Status: DISCONTINUED | OUTPATIENT
Start: 2021-05-18 | End: 2021-05-21

## 2021-05-18 RX ORDER — NOREPINEPHRINE BITARTRATE 1 MG/ML
INJECTION, SOLUTION INTRAVENOUS
Status: DISPENSED
Start: 2021-05-18 | End: 2021-05-19

## 2021-05-18 RX ORDER — MORPHINE SULFATE 2 MG/ML
2 INJECTION, SOLUTION INTRAMUSCULAR; INTRAVENOUS
Status: DISCONTINUED | OUTPATIENT
Start: 2021-05-18 | End: 2021-05-21

## 2021-05-18 RX ORDER — PROPOFOL 10 MG/ML
VIAL (ML) INTRAVENOUS
Status: DISCONTINUED | OUTPATIENT
Start: 2021-05-18 | End: 2021-05-18

## 2021-05-18 RX ORDER — DRONABINOL 2.5 MG/1
2.5 CAPSULE ORAL 2 TIMES DAILY
Status: DISCONTINUED | OUTPATIENT
Start: 2021-05-19 | End: 2021-05-21

## 2021-05-18 RX ORDER — ACETAMINOPHEN 500 MG
1000 TABLET ORAL EVERY 6 HOURS
Status: DISPENSED | OUTPATIENT
Start: 2021-05-18 | End: 2021-05-20

## 2021-05-18 RX ORDER — HYDROMORPHONE HYDROCHLORIDE 1 MG/ML
0.2 INJECTION, SOLUTION INTRAMUSCULAR; INTRAVENOUS; SUBCUTANEOUS EVERY 5 MIN PRN
Status: DISCONTINUED | OUTPATIENT
Start: 2021-05-18 | End: 2021-05-18 | Stop reason: HOSPADM

## 2021-05-18 RX ORDER — EPHEDRINE SULFATE 50 MG/ML
INJECTION, SOLUTION INTRAVENOUS
Status: DISCONTINUED | OUTPATIENT
Start: 2021-05-18 | End: 2021-05-18

## 2021-05-18 RX ORDER — PHENYLEPHRINE HYDROCHLORIDE 10 MG/ML
INJECTION INTRAVENOUS CONTINUOUS PRN
Status: DISCONTINUED | OUTPATIENT
Start: 2021-05-18 | End: 2021-05-18

## 2021-05-18 RX ORDER — SODIUM CHLORIDE 0.9 % (FLUSH) 0.9 %
3 SYRINGE (ML) INJECTION
Status: DISCONTINUED | OUTPATIENT
Start: 2021-05-18 | End: 2021-05-18 | Stop reason: HOSPADM

## 2021-05-18 RX ORDER — OXYCODONE HYDROCHLORIDE 5 MG/1
5 TABLET ORAL EVERY 4 HOURS PRN
Status: DISCONTINUED | OUTPATIENT
Start: 2021-05-18 | End: 2021-05-18

## 2021-05-18 RX ORDER — PHENYLEPHRINE HYDROCHLORIDE 10 MG/ML
400 INJECTION INTRAVENOUS ONCE
Status: COMPLETED | OUTPATIENT
Start: 2021-05-18 | End: 2021-05-18

## 2021-05-18 RX ORDER — ONDANSETRON 2 MG/ML
4 INJECTION INTRAMUSCULAR; INTRAVENOUS EVERY 8 HOURS PRN
Status: DISCONTINUED | OUTPATIENT
Start: 2021-05-18 | End: 2021-06-08 | Stop reason: HOSPADM

## 2021-05-18 RX ORDER — INSULIN ASPART 100 [IU]/ML
0-5 INJECTION, SOLUTION INTRAVENOUS; SUBCUTANEOUS EVERY 6 HOURS PRN
Status: DISCONTINUED | OUTPATIENT
Start: 2021-05-18 | End: 2021-05-19

## 2021-05-18 RX ORDER — OXYCODONE HYDROCHLORIDE 10 MG/1
10 TABLET ORAL
Status: DISCONTINUED | OUTPATIENT
Start: 2021-05-18 | End: 2021-05-21

## 2021-05-18 RX ORDER — LIDOCAINE HYDROCHLORIDE 20 MG/ML
INJECTION, SOLUTION EPIDURAL; INFILTRATION; INTRACAUDAL; PERINEURAL
Status: DISCONTINUED | OUTPATIENT
Start: 2021-05-18 | End: 2021-05-18

## 2021-05-18 RX ORDER — ONDANSETRON 2 MG/ML
4 INJECTION INTRAMUSCULAR; INTRAVENOUS DAILY PRN
Status: DISCONTINUED | OUTPATIENT
Start: 2021-05-18 | End: 2021-05-18 | Stop reason: HOSPADM

## 2021-05-18 RX ORDER — EPHEDRINE SULFATE/0.9% NACL/PF 50 MG/10ML
50 SYRINGE (ML) INTRAVENOUS ONCE
Status: DISCONTINUED | OUTPATIENT
Start: 2021-05-18 | End: 2021-05-18 | Stop reason: HOSPADM

## 2021-05-18 RX ORDER — ACETAMINOPHEN 10 MG/ML
1000 INJECTION, SOLUTION INTRAVENOUS ONCE
Status: COMPLETED | OUTPATIENT
Start: 2021-05-18 | End: 2021-05-18

## 2021-05-18 RX ADMIN — DOCUSATE SODIUM 50MG AND SENNOSIDES 8.6MG 1 TABLET: 8.6; 5 TABLET, FILM COATED ORAL at 09:05

## 2021-05-18 RX ADMIN — ROPIVACAINE HYDROCHLORIDE 8 ML/HR: 2 INJECTION, SOLUTION EPIDURAL; INFILTRATION at 10:05

## 2021-05-18 RX ADMIN — CYCLOBENZAPRINE HYDROCHLORIDE 5 MG: 5 TABLET, FILM COATED ORAL at 09:05

## 2021-05-18 RX ADMIN — NYSTATIN: 100000 POWDER TOPICAL at 09:05

## 2021-05-18 RX ADMIN — PROPOFOL 140 MG: 10 INJECTION, EMULSION INTRAVENOUS at 01:05

## 2021-05-18 RX ADMIN — Medication 300 MCG: at 01:05

## 2021-05-18 RX ADMIN — ACETAMINOPHEN 650 MG: 325 TABLET ORAL at 06:05

## 2021-05-18 RX ADMIN — ERAVACYCLINE 98.2 MG: 50 INJECTION, POWDER, LYOPHILIZED, FOR SOLUTION INTRAVENOUS at 04:05

## 2021-05-18 RX ADMIN — OXYCODONE HYDROCHLORIDE 10 MG: 10 TABLET ORAL at 06:05

## 2021-05-18 RX ADMIN — FLUCONAZOLE IN SODIUM CHLORIDE 400 MG: 2 INJECTION, SOLUTION INTRAVENOUS at 07:05

## 2021-05-18 RX ADMIN — VASOPRESSIN 2 UNITS: 20 INJECTION INTRAVENOUS at 04:05

## 2021-05-18 RX ADMIN — PHENYLEPHRINE HYDROCHLORIDE 0.5 MCG/KG/MIN: 10 INJECTION INTRAVENOUS at 02:05

## 2021-05-18 RX ADMIN — GABAPENTIN 600 MG: 300 CAPSULE ORAL at 09:05

## 2021-05-18 RX ADMIN — CALCIUM CHLORIDE 0.5 G: 100 INJECTION, SOLUTION INTRAVENOUS at 02:05

## 2021-05-18 RX ADMIN — OXYCODONE HYDROCHLORIDE 10 MG: 10 TABLET ORAL at 01:05

## 2021-05-18 RX ADMIN — DRONABINOL 2.5 MG: 2.5 CAPSULE ORAL at 09:05

## 2021-05-18 RX ADMIN — ONDANSETRON 4 MG: 2 INJECTION, SOLUTION INTRAMUSCULAR; INTRAVENOUS at 04:05

## 2021-05-18 RX ADMIN — SODIUM BICARBONATE 650 MG TABLET 1300 MG: at 09:05

## 2021-05-18 RX ADMIN — AMITRIPTYLINE HYDROCHLORIDE 25 MG: 25 TABLET, FILM COATED ORAL at 10:05

## 2021-05-18 RX ADMIN — VASOPRESSIN 1 UNITS: 20 INJECTION INTRAVENOUS at 02:05

## 2021-05-18 RX ADMIN — ALLOPURINOL 100 MG: 100 TABLET ORAL at 09:05

## 2021-05-18 RX ADMIN — Medication 200 MCG: at 01:05

## 2021-05-18 RX ADMIN — CYCLOBENZAPRINE HYDROCHLORIDE 5 MG: 5 TABLET, FILM COATED ORAL at 10:05

## 2021-05-18 RX ADMIN — BUPIVACAINE HYDROCHLORIDE AND EPINEPHRINE BITARTRATE 20 ML: 5; .005 INJECTION, SOLUTION EPIDURAL; INTRACAUDAL; PERINEURAL at 12:05

## 2021-05-18 RX ADMIN — MORPHINE SULFATE 2 MG: 2 INJECTION, SOLUTION INTRAMUSCULAR; INTRAVENOUS at 07:05

## 2021-05-18 RX ADMIN — ACETAMINOPHEN 1000 MG: 10 INJECTION INTRAVENOUS at 04:05

## 2021-05-18 RX ADMIN — HYDROMORPHONE HYDROCHLORIDE 0.5 MG: 1 INJECTION, SOLUTION INTRAMUSCULAR; INTRAVENOUS; SUBCUTANEOUS at 12:05

## 2021-05-18 RX ADMIN — Medication 300 MCG: at 02:05

## 2021-05-18 RX ADMIN — ERAVACYCLINE 98.2 MG: 50 INJECTION, POWDER, LYOPHILIZED, FOR SOLUTION INTRAVENOUS at 03:05

## 2021-05-18 RX ADMIN — PROPOFOL 60 MG: 10 INJECTION, EMULSION INTRAVENOUS at 01:05

## 2021-05-18 RX ADMIN — BUPIVACAINE HYDROCHLORIDE AND EPINEPHRINE BITARTRATE 20 ML: 2.5; .0091 INJECTION, SOLUTION EPIDURAL; INFILTRATION; INTRACAUDAL; PERINEURAL at 12:05

## 2021-05-18 RX ADMIN — FUROSEMIDE 40 MG: 10 INJECTION, SOLUTION INTRAMUSCULAR; INTRAVENOUS at 06:05

## 2021-05-18 RX ADMIN — Medication 0.02 MCG/KG/MIN: at 05:05

## 2021-05-18 RX ADMIN — EPHEDRINE SULFATE 25 MG: 50 INJECTION INTRAVENOUS at 04:05

## 2021-05-18 RX ADMIN — PHENYLEPHRINE HYDROCHLORIDE 400 MCG: 10 INJECTION INTRAVENOUS at 05:05

## 2021-05-18 RX ADMIN — SODIUM CHLORIDE 10 ML/HR: 0.9 INJECTION, SOLUTION INTRAVENOUS at 03:05

## 2021-05-18 RX ADMIN — NYSTATIN: 100000 POWDER TOPICAL at 11:05

## 2021-05-18 RX ADMIN — ASPIRIN 325 MG ORAL TABLET 325 MG: 325 PILL ORAL at 09:05

## 2021-05-18 RX ADMIN — MEROPENEM 1 G: 1 INJECTION INTRAVENOUS at 11:05

## 2021-05-18 RX ADMIN — LIDOCAINE HYDROCHLORIDE 100 MG: 20 INJECTION, SOLUTION EPIDURAL; INFILTRATION; INTRACAUDAL at 01:05

## 2021-05-18 RX ADMIN — Medication 400 MCG: at 01:05

## 2021-05-18 RX ADMIN — SODIUM BICARBONATE 650 MG TABLET 1300 MG: at 10:05

## 2021-05-19 PROBLEM — A41.9 SEPSIS: Status: ACTIVE | Noted: 2021-05-19

## 2021-05-19 LAB
ALBUMIN SERPL BCP-MCNC: 1 G/DL (ref 3.5–5.2)
ALP SERPL-CCNC: 302 U/L (ref 55–135)
ALT SERPL W/O P-5'-P-CCNC: 18 U/L (ref 10–44)
ANION GAP SERPL CALC-SCNC: 9 MMOL/L (ref 8–16)
ANISOCYTOSIS BLD QL SMEAR: SLIGHT
AST SERPL-CCNC: 35 U/L (ref 10–40)
BASO STIPL BLD QL SMEAR: ABNORMAL
BASOPHILS # BLD AUTO: 0.04 K/UL (ref 0–0.2)
BASOPHILS NFR BLD: 0.2 % (ref 0–1.9)
BILIRUB SERPL-MCNC: 0.5 MG/DL (ref 0.1–1)
BUN SERPL-MCNC: 92 MG/DL (ref 6–20)
BURR CELLS BLD QL SMEAR: ABNORMAL
CALCIUM SERPL-MCNC: 7.2 MG/DL (ref 8.7–10.5)
CHLORIDE SERPL-SCNC: 107 MMOL/L (ref 95–110)
CO2 SERPL-SCNC: 16 MMOL/L (ref 23–29)
CREAT SERPL-MCNC: 2.5 MG/DL (ref 0.5–1.4)
DIFFERENTIAL METHOD: ABNORMAL
EOSINOPHIL # BLD AUTO: 0 K/UL (ref 0–0.5)
EOSINOPHIL NFR BLD: 0 % (ref 0–8)
ERYTHROCYTE [DISTWIDTH] IN BLOOD BY AUTOMATED COUNT: 17.2 % (ref 11.5–14.5)
EST. GFR  (AFRICAN AMERICAN): 24.4 ML/MIN/1.73 M^2
EST. GFR  (NON AFRICAN AMERICAN): 21.1 ML/MIN/1.73 M^2
GIANT PLATELETS BLD QL SMEAR: PRESENT
GLUCOSE SERPL-MCNC: 106 MG/DL (ref 70–110)
HCT VFR BLD AUTO: 28.3 % (ref 37–48.5)
HGB BLD-MCNC: 9.4 G/DL (ref 12–16)
HYPOCHROMIA BLD QL SMEAR: ABNORMAL
IMM GRANULOCYTES # BLD AUTO: 0.33 K/UL (ref 0–0.04)
IMM GRANULOCYTES NFR BLD AUTO: 1.5 % (ref 0–0.5)
LYMPHOCYTES # BLD AUTO: 0.8 K/UL (ref 1–4.8)
LYMPHOCYTES NFR BLD: 3.6 % (ref 18–48)
MAGNESIUM SERPL-MCNC: 2 MG/DL (ref 1.6–2.6)
MCH RBC QN AUTO: 29.7 PG (ref 27–31)
MCHC RBC AUTO-ENTMCNC: 33.2 G/DL (ref 32–36)
MCV RBC AUTO: 90 FL (ref 82–98)
MONOCYTES # BLD AUTO: 0.8 K/UL (ref 0.3–1)
MONOCYTES NFR BLD: 3.6 % (ref 4–15)
NEUTROPHILS # BLD AUTO: 20 K/UL (ref 1.8–7.7)
NEUTROPHILS NFR BLD: 91.1 % (ref 38–73)
NRBC BLD-RTO: 0 /100 WBC
OVALOCYTES BLD QL SMEAR: ABNORMAL
PHOSPHATE SERPL-MCNC: 6.7 MG/DL (ref 2.7–4.5)
PLATELET # BLD AUTO: 426 K/UL (ref 150–450)
PLATELET BLD QL SMEAR: ABNORMAL
PMV BLD AUTO: 10.6 FL (ref 9.2–12.9)
POCT GLUCOSE: 104 MG/DL (ref 70–110)
POCT GLUCOSE: 106 MG/DL (ref 70–110)
POCT GLUCOSE: 109 MG/DL (ref 70–110)
POCT GLUCOSE: 111 MG/DL (ref 70–110)
POIKILOCYTOSIS BLD QL SMEAR: SLIGHT
POLYCHROMASIA BLD QL SMEAR: ABNORMAL
POTASSIUM SERPL-SCNC: 4.8 MMOL/L (ref 3.5–5.1)
PROT SERPL-MCNC: 4.7 G/DL (ref 6–8.4)
RBC # BLD AUTO: 3.16 M/UL (ref 4–5.4)
SCHISTOCYTES BLD QL SMEAR: ABNORMAL
SCHISTOCYTES BLD QL SMEAR: PRESENT
SODIUM SERPL-SCNC: 132 MMOL/L (ref 136–145)
WBC # BLD AUTO: 21.97 K/UL (ref 3.9–12.7)
WBC TOXIC VACUOLES BLD QL SMEAR: PRESENT

## 2021-05-19 PROCEDURE — 99233 PR SUBSEQUENT HOSPITAL CARE,LEVL III: ICD-10-PCS | Mod: ,,, | Performed by: INTERNAL MEDICINE

## 2021-05-19 PROCEDURE — 20000000 HC ICU ROOM

## 2021-05-19 PROCEDURE — 80053 COMPREHEN METABOLIC PANEL: CPT | Performed by: STUDENT IN AN ORGANIZED HEALTH CARE EDUCATION/TRAINING PROGRAM

## 2021-05-19 PROCEDURE — 63600175 PHARM REV CODE 636 W HCPCS: Performed by: SURGERY

## 2021-05-19 PROCEDURE — 99024 POSTOP FOLLOW-UP VISIT: CPT | Mod: POP,,, | Performed by: SURGERY

## 2021-05-19 PROCEDURE — 85025 COMPLETE CBC W/AUTO DIFF WBC: CPT | Performed by: STUDENT IN AN ORGANIZED HEALTH CARE EDUCATION/TRAINING PROGRAM

## 2021-05-19 PROCEDURE — 99233 SBSQ HOSP IP/OBS HIGH 50: CPT | Mod: ,,, | Performed by: INTERNAL MEDICINE

## 2021-05-19 PROCEDURE — 99291 CRITICAL CARE FIRST HOUR: CPT | Mod: ,,, | Performed by: ANESTHESIOLOGY

## 2021-05-19 PROCEDURE — 99024 PR POST-OP FOLLOW-UP VISIT: ICD-10-PCS | Mod: POP,,, | Performed by: SURGERY

## 2021-05-19 PROCEDURE — 25000003 PHARM REV CODE 250: Performed by: STUDENT IN AN ORGANIZED HEALTH CARE EDUCATION/TRAINING PROGRAM

## 2021-05-19 PROCEDURE — 25000003 PHARM REV CODE 250: Performed by: ANESTHESIOLOGY

## 2021-05-19 PROCEDURE — 63600175 PHARM REV CODE 636 W HCPCS: Performed by: STUDENT IN AN ORGANIZED HEALTH CARE EDUCATION/TRAINING PROGRAM

## 2021-05-19 PROCEDURE — 94761 N-INVAS EAR/PLS OXIMETRY MLT: CPT

## 2021-05-19 PROCEDURE — 84100 ASSAY OF PHOSPHORUS: CPT | Performed by: STUDENT IN AN ORGANIZED HEALTH CARE EDUCATION/TRAINING PROGRAM

## 2021-05-19 PROCEDURE — 99232 SBSQ HOSP IP/OBS MODERATE 35: CPT | Mod: GC,,, | Performed by: INTERNAL MEDICINE

## 2021-05-19 PROCEDURE — 83735 ASSAY OF MAGNESIUM: CPT | Performed by: STUDENT IN AN ORGANIZED HEALTH CARE EDUCATION/TRAINING PROGRAM

## 2021-05-19 PROCEDURE — 27000221 HC OXYGEN, UP TO 24 HOURS

## 2021-05-19 PROCEDURE — 99291 PR CRITICAL CARE, E/M 30-74 MINUTES: ICD-10-PCS | Mod: ,,, | Performed by: ANESTHESIOLOGY

## 2021-05-19 PROCEDURE — 99232 PR SUBSEQUENT HOSPITAL CARE,LEVL II: ICD-10-PCS | Mod: GC,,, | Performed by: INTERNAL MEDICINE

## 2021-05-19 RX ORDER — SEVELAMER CARBONATE 800 MG/1
800 TABLET, FILM COATED ORAL
Status: CANCELLED | OUTPATIENT
Start: 2021-05-19

## 2021-05-19 RX ORDER — IBUPROFEN 200 MG
24 TABLET ORAL
Status: DISCONTINUED | OUTPATIENT
Start: 2021-05-19 | End: 2021-05-24

## 2021-05-19 RX ORDER — HEPARIN SODIUM 5000 [USP'U]/ML
5000 INJECTION, SOLUTION INTRAVENOUS; SUBCUTANEOUS EVERY 8 HOURS
Status: DISCONTINUED | OUTPATIENT
Start: 2021-05-19 | End: 2021-05-26

## 2021-05-19 RX ORDER — IBUPROFEN 200 MG
16 TABLET ORAL
Status: DISCONTINUED | OUTPATIENT
Start: 2021-05-19 | End: 2021-05-24

## 2021-05-19 RX ORDER — DULOXETIN HYDROCHLORIDE 30 MG/1
30 CAPSULE, DELAYED RELEASE ORAL 2 TIMES DAILY
Status: DISCONTINUED | OUTPATIENT
Start: 2021-05-19 | End: 2021-05-20

## 2021-05-19 RX ORDER — INSULIN ASPART 100 [IU]/ML
0-5 INJECTION, SOLUTION INTRAVENOUS; SUBCUTANEOUS
Status: DISCONTINUED | OUTPATIENT
Start: 2021-05-19 | End: 2021-05-24

## 2021-05-19 RX ORDER — DULOXETIN HYDROCHLORIDE 20 MG/1
20 CAPSULE, DELAYED RELEASE ORAL 2 TIMES DAILY
Status: DISCONTINUED | OUTPATIENT
Start: 2021-05-19 | End: 2021-05-19

## 2021-05-19 RX ORDER — GLUCAGON 1 MG
1 KIT INJECTION
Status: DISCONTINUED | OUTPATIENT
Start: 2021-05-19 | End: 2021-05-24

## 2021-05-19 RX ORDER — DULOXETIN HYDROCHLORIDE 60 MG/1
60 CAPSULE, DELAYED RELEASE ORAL DAILY
Status: DISCONTINUED | OUTPATIENT
Start: 2021-05-19 | End: 2021-05-19

## 2021-05-19 RX ORDER — HEPARIN SODIUM 5000 [USP'U]/ML
5000 INJECTION, SOLUTION INTRAVENOUS; SUBCUTANEOUS EVERY 8 HOURS
Status: DISCONTINUED | OUTPATIENT
Start: 2021-05-19 | End: 2021-05-19

## 2021-05-19 RX ADMIN — OXYCODONE 5 MG: 5 TABLET ORAL at 09:05

## 2021-05-19 RX ADMIN — DULOXETINE HYDROCHLORIDE 30 MG: 30 CAPSULE, DELAYED RELEASE ORAL at 08:05

## 2021-05-19 RX ADMIN — CYCLOBENZAPRINE HYDROCHLORIDE 5 MG: 5 TABLET, FILM COATED ORAL at 03:05

## 2021-05-19 RX ADMIN — DRONABINOL 2.5 MG: 2.5 CAPSULE ORAL at 09:05

## 2021-05-19 RX ADMIN — ROPIVACAINE HYDROCHLORIDE 8 ML/HR: 2 INJECTION, SOLUTION EPIDURAL; INFILTRATION at 08:05

## 2021-05-19 RX ADMIN — HEPARIN SODIUM 5000 UNITS: 5000 INJECTION INTRAVENOUS; SUBCUTANEOUS at 09:05

## 2021-05-19 RX ADMIN — ACETAMINOPHEN 1000 MG: 500 TABLET, FILM COATED ORAL at 01:05

## 2021-05-19 RX ADMIN — HEPARIN SODIUM 5000 UNITS: 5000 INJECTION INTRAVENOUS; SUBCUTANEOUS at 10:05

## 2021-05-19 RX ADMIN — CYCLOBENZAPRINE HYDROCHLORIDE 5 MG: 5 TABLET, FILM COATED ORAL at 08:05

## 2021-05-19 RX ADMIN — FLUCONAZOLE IN SODIUM CHLORIDE 400 MG: 2 INJECTION, SOLUTION INTRAVENOUS at 09:05

## 2021-05-19 RX ADMIN — MEROPENEM 1 G: 1 INJECTION INTRAVENOUS at 10:05

## 2021-05-19 RX ADMIN — GABAPENTIN 600 MG: 300 CAPSULE ORAL at 08:05

## 2021-05-19 RX ADMIN — NYSTATIN: 100000 POWDER TOPICAL at 08:05

## 2021-05-19 RX ADMIN — METHOCARBAMOL 500 MG: 500 TABLET ORAL at 05:05

## 2021-05-19 RX ADMIN — NYSTATIN: 100000 POWDER TOPICAL at 09:05

## 2021-05-19 RX ADMIN — ATORVASTATIN CALCIUM 80 MG: 20 TABLET, FILM COATED ORAL at 08:05

## 2021-05-19 RX ADMIN — ACETAMINOPHEN 1000 MG: 500 TABLET, FILM COATED ORAL at 05:05

## 2021-05-19 RX ADMIN — DRONABINOL 2.5 MG: 2.5 CAPSULE ORAL at 08:05

## 2021-05-19 RX ADMIN — OXYCODONE 5 MG: 5 TABLET ORAL at 03:05

## 2021-05-19 RX ADMIN — SODIUM BICARBONATE 650 MG TABLET 1300 MG: at 09:05

## 2021-05-19 RX ADMIN — GABAPENTIN 600 MG: 300 CAPSULE ORAL at 03:05

## 2021-05-19 RX ADMIN — SODIUM BICARBONATE 650 MG TABLET 1300 MG: at 04:05

## 2021-05-19 RX ADMIN — MEROPENEM 1 G: 1 INJECTION INTRAVENOUS at 11:05

## 2021-05-19 RX ADMIN — HEPARIN SODIUM 5000 UNITS: 5000 INJECTION INTRAVENOUS; SUBCUTANEOUS at 02:05

## 2021-05-19 RX ADMIN — ASPIRIN 325 MG ORAL TABLET 325 MG: 325 PILL ORAL at 09:05

## 2021-05-19 RX ADMIN — GABAPENTIN 600 MG: 300 CAPSULE ORAL at 09:05

## 2021-05-19 RX ADMIN — DOCUSATE SODIUM 50MG AND SENNOSIDES 8.6MG 1 TABLET: 8.6; 5 TABLET, FILM COATED ORAL at 09:05

## 2021-05-19 RX ADMIN — AMITRIPTYLINE HYDROCHLORIDE 25 MG: 25 TABLET, FILM COATED ORAL at 08:05

## 2021-05-19 RX ADMIN — Medication 0.02 MCG/KG/MIN: at 12:05

## 2021-05-19 RX ADMIN — CYCLOBENZAPRINE HYDROCHLORIDE 5 MG: 5 TABLET, FILM COATED ORAL at 09:05

## 2021-05-20 PROBLEM — R41.0 DELIRIUM: Status: ACTIVE | Noted: 2021-05-20

## 2021-05-20 PROBLEM — G93.40 ENCEPHALOPATHY: Status: ACTIVE | Noted: 2021-05-20

## 2021-05-20 LAB
ALBUMIN SERPL BCP-MCNC: 1.6 G/DL (ref 3.5–5.2)
ALP SERPL-CCNC: 322 U/L (ref 55–135)
ALT SERPL W/O P-5'-P-CCNC: 18 U/L (ref 10–44)
ANION GAP SERPL CALC-SCNC: 11 MMOL/L (ref 8–16)
ANION GAP SERPL CALC-SCNC: 11 MMOL/L (ref 8–16)
ANION GAP SERPL CALC-SCNC: 8 MMOL/L (ref 8–16)
AST SERPL-CCNC: 43 U/L (ref 10–40)
BASOPHILS # BLD AUTO: 0.05 K/UL (ref 0–0.2)
BASOPHILS # BLD AUTO: 0.07 K/UL (ref 0–0.2)
BASOPHILS NFR BLD: 0.2 % (ref 0–1.9)
BASOPHILS NFR BLD: 0.3 % (ref 0–1.9)
BILIRUB SERPL-MCNC: 0.7 MG/DL (ref 0.1–1)
BUN SERPL-MCNC: 100 MG/DL (ref 6–20)
BUN SERPL-MCNC: 96 MG/DL (ref 6–20)
BUN SERPL-MCNC: 97 MG/DL (ref 6–20)
CALCIUM SERPL-MCNC: 7.4 MG/DL (ref 8.7–10.5)
CALCIUM SERPL-MCNC: 7.5 MG/DL (ref 8.7–10.5)
CALCIUM SERPL-MCNC: 7.5 MG/DL (ref 8.7–10.5)
CHLORIDE SERPL-SCNC: 104 MMOL/L (ref 95–110)
CHLORIDE SERPL-SCNC: 105 MMOL/L (ref 95–110)
CHLORIDE SERPL-SCNC: 105 MMOL/L (ref 95–110)
CO2 SERPL-SCNC: 15 MMOL/L (ref 23–29)
CO2 SERPL-SCNC: 16 MMOL/L (ref 23–29)
CO2 SERPL-SCNC: 18 MMOL/L (ref 23–29)
CREAT SERPL-MCNC: 3 MG/DL (ref 0.5–1.4)
CREAT SERPL-MCNC: 3 MG/DL (ref 0.5–1.4)
CREAT SERPL-MCNC: 3.3 MG/DL (ref 0.5–1.4)
DIFFERENTIAL METHOD: ABNORMAL
DIFFERENTIAL METHOD: ABNORMAL
EOSINOPHIL # BLD AUTO: 0.1 K/UL (ref 0–0.5)
EOSINOPHIL # BLD AUTO: 0.2 K/UL (ref 0–0.5)
EOSINOPHIL NFR BLD: 0.4 % (ref 0–8)
EOSINOPHIL NFR BLD: 0.9 % (ref 0–8)
ERYTHROCYTE [DISTWIDTH] IN BLOOD BY AUTOMATED COUNT: 17.8 % (ref 11.5–14.5)
ERYTHROCYTE [DISTWIDTH] IN BLOOD BY AUTOMATED COUNT: 17.9 % (ref 11.5–14.5)
EST. GFR  (AFRICAN AMERICAN): 17.4 ML/MIN/1.73 M^2
EST. GFR  (AFRICAN AMERICAN): 19.6 ML/MIN/1.73 M^2
EST. GFR  (AFRICAN AMERICAN): 19.6 ML/MIN/1.73 M^2
EST. GFR  (NON AFRICAN AMERICAN): 15.1 ML/MIN/1.73 M^2
EST. GFR  (NON AFRICAN AMERICAN): 17 ML/MIN/1.73 M^2
EST. GFR  (NON AFRICAN AMERICAN): 17 ML/MIN/1.73 M^2
GLUCOSE SERPL-MCNC: 82 MG/DL (ref 70–110)
GLUCOSE SERPL-MCNC: 88 MG/DL (ref 70–110)
GLUCOSE SERPL-MCNC: 93 MG/DL (ref 70–110)
HCT VFR BLD AUTO: 24.1 % (ref 37–48.5)
HCT VFR BLD AUTO: 25.7 % (ref 37–48.5)
HGB BLD-MCNC: 7.9 G/DL (ref 12–16)
HGB BLD-MCNC: 8.2 G/DL (ref 12–16)
IMM GRANULOCYTES # BLD AUTO: 0.42 K/UL (ref 0–0.04)
IMM GRANULOCYTES # BLD AUTO: 0.68 K/UL (ref 0–0.04)
IMM GRANULOCYTES NFR BLD AUTO: 2 % (ref 0–0.5)
IMM GRANULOCYTES NFR BLD AUTO: 2.9 % (ref 0–0.5)
LYMPHOCYTES # BLD AUTO: 1.2 K/UL (ref 1–4.8)
LYMPHOCYTES # BLD AUTO: 1.2 K/UL (ref 1–4.8)
LYMPHOCYTES NFR BLD: 5 % (ref 18–48)
LYMPHOCYTES NFR BLD: 5.9 % (ref 18–48)
MAGNESIUM SERPL-MCNC: 2.2 MG/DL (ref 1.6–2.6)
MCH RBC QN AUTO: 29 PG (ref 27–31)
MCH RBC QN AUTO: 30.3 PG (ref 27–31)
MCHC RBC AUTO-ENTMCNC: 31.9 G/DL (ref 32–36)
MCHC RBC AUTO-ENTMCNC: 32.8 G/DL (ref 32–36)
MCV RBC AUTO: 91 FL (ref 82–98)
MCV RBC AUTO: 92 FL (ref 82–98)
MONOCYTES # BLD AUTO: 1.4 K/UL (ref 0.3–1)
MONOCYTES # BLD AUTO: 1.9 K/UL (ref 0.3–1)
MONOCYTES NFR BLD: 6.7 % (ref 4–15)
MONOCYTES NFR BLD: 7.9 % (ref 4–15)
NEUTROPHILS # BLD AUTO: 17.6 K/UL (ref 1.8–7.7)
NEUTROPHILS # BLD AUTO: 19.9 K/UL (ref 1.8–7.7)
NEUTROPHILS NFR BLD: 83.5 % (ref 38–73)
NEUTROPHILS NFR BLD: 84.3 % (ref 38–73)
NRBC BLD-RTO: 0 /100 WBC
NRBC BLD-RTO: 0 /100 WBC
PHOSPHATE SERPL-MCNC: 7 MG/DL (ref 2.7–4.5)
PLATELET # BLD AUTO: 223 K/UL (ref 150–450)
PLATELET # BLD AUTO: 276 K/UL (ref 150–450)
PMV BLD AUTO: 10.9 FL (ref 9.2–12.9)
PMV BLD AUTO: 11.1 FL (ref 9.2–12.9)
POCT GLUCOSE: 104 MG/DL (ref 70–110)
POCT GLUCOSE: 105 MG/DL (ref 70–110)
POCT GLUCOSE: 108 MG/DL (ref 70–110)
POCT GLUCOSE: 81 MG/DL (ref 70–110)
POCT GLUCOSE: 97 MG/DL (ref 70–110)
POTASSIUM SERPL-SCNC: 5.4 MMOL/L (ref 3.5–5.1)
POTASSIUM SERPL-SCNC: 5.4 MMOL/L (ref 3.5–5.1)
POTASSIUM SERPL-SCNC: 5.5 MMOL/L (ref 3.5–5.1)
PROT SERPL-MCNC: 4.8 G/DL (ref 6–8.4)
RBC # BLD AUTO: 2.61 M/UL (ref 4–5.4)
RBC # BLD AUTO: 2.83 M/UL (ref 4–5.4)
SODIUM SERPL-SCNC: 130 MMOL/L (ref 136–145)
SODIUM SERPL-SCNC: 131 MMOL/L (ref 136–145)
SODIUM SERPL-SCNC: 132 MMOL/L (ref 136–145)
WBC # BLD AUTO: 20.85 K/UL (ref 3.9–12.7)
WBC # BLD AUTO: 23.81 K/UL (ref 3.9–12.7)

## 2021-05-20 PROCEDURE — 99233 SBSQ HOSP IP/OBS HIGH 50: CPT | Mod: ,,, | Performed by: PSYCHIATRY & NEUROLOGY

## 2021-05-20 PROCEDURE — 84100 ASSAY OF PHOSPHORUS: CPT | Performed by: STUDENT IN AN ORGANIZED HEALTH CARE EDUCATION/TRAINING PROGRAM

## 2021-05-20 PROCEDURE — 36556 PR INSERT NON-TUNNEL CV CATH 5+ YRS OLD: ICD-10-PCS | Mod: ,,, | Performed by: ANESTHESIOLOGY

## 2021-05-20 PROCEDURE — 99233 PR SUBSEQUENT HOSPITAL CARE,LEVL III: ICD-10-PCS | Mod: GC,,, | Performed by: INTERNAL MEDICINE

## 2021-05-20 PROCEDURE — 36556 INSERT NON-TUNNEL CV CATH: CPT | Mod: ,,, | Performed by: ANESTHESIOLOGY

## 2021-05-20 PROCEDURE — 80053 COMPREHEN METABOLIC PANEL: CPT | Performed by: STUDENT IN AN ORGANIZED HEALTH CARE EDUCATION/TRAINING PROGRAM

## 2021-05-20 PROCEDURE — 63600175 PHARM REV CODE 636 W HCPCS: Performed by: STUDENT IN AN ORGANIZED HEALTH CARE EDUCATION/TRAINING PROGRAM

## 2021-05-20 PROCEDURE — 25000003 PHARM REV CODE 250: Performed by: STUDENT IN AN ORGANIZED HEALTH CARE EDUCATION/TRAINING PROGRAM

## 2021-05-20 PROCEDURE — 93005 ELECTROCARDIOGRAM TRACING: CPT

## 2021-05-20 PROCEDURE — 80048 BASIC METABOLIC PNL TOTAL CA: CPT | Performed by: SURGERY

## 2021-05-20 PROCEDURE — 25000003 PHARM REV CODE 250: Performed by: INTERNAL MEDICINE

## 2021-05-20 PROCEDURE — 99233 PR SUBSEQUENT HOSPITAL CARE,LEVL III: ICD-10-PCS | Mod: ,,, | Performed by: PSYCHIATRY & NEUROLOGY

## 2021-05-20 PROCEDURE — 99291 CRITICAL CARE FIRST HOUR: CPT | Mod: 25,,, | Performed by: ANESTHESIOLOGY

## 2021-05-20 PROCEDURE — 93010 ELECTROCARDIOGRAM REPORT: CPT | Mod: ,,, | Performed by: INTERNAL MEDICINE

## 2021-05-20 PROCEDURE — 20000000 HC ICU ROOM

## 2021-05-20 PROCEDURE — P9045 ALBUMIN (HUMAN), 5%, 250 ML: HCPCS | Mod: JG | Performed by: SURGERY

## 2021-05-20 PROCEDURE — 63600175 PHARM REV CODE 636 W HCPCS: Mod: JG | Performed by: SURGERY

## 2021-05-20 PROCEDURE — 99233 SBSQ HOSP IP/OBS HIGH 50: CPT | Mod: GC,,, | Performed by: INTERNAL MEDICINE

## 2021-05-20 PROCEDURE — 25000003 PHARM REV CODE 250: Performed by: ANESTHESIOLOGY

## 2021-05-20 PROCEDURE — 63600175 PHARM REV CODE 636 W HCPCS: Performed by: SURGERY

## 2021-05-20 PROCEDURE — 99291 PR CRITICAL CARE, E/M 30-74 MINUTES: ICD-10-PCS | Mod: 25,,, | Performed by: ANESTHESIOLOGY

## 2021-05-20 PROCEDURE — 85025 COMPLETE CBC W/AUTO DIFF WBC: CPT | Performed by: STUDENT IN AN ORGANIZED HEALTH CARE EDUCATION/TRAINING PROGRAM

## 2021-05-20 PROCEDURE — 85025 COMPLETE CBC W/AUTO DIFF WBC: CPT | Mod: 91 | Performed by: SURGERY

## 2021-05-20 PROCEDURE — 80048 BASIC METABOLIC PNL TOTAL CA: CPT | Mod: 91 | Performed by: STUDENT IN AN ORGANIZED HEALTH CARE EDUCATION/TRAINING PROGRAM

## 2021-05-20 PROCEDURE — 83735 ASSAY OF MAGNESIUM: CPT | Performed by: STUDENT IN AN ORGANIZED HEALTH CARE EDUCATION/TRAINING PROGRAM

## 2021-05-20 PROCEDURE — 93010 EKG 12-LEAD: ICD-10-PCS | Mod: ,,, | Performed by: INTERNAL MEDICINE

## 2021-05-20 RX ORDER — GABAPENTIN 300 MG/1
300 CAPSULE ORAL 3 TIMES DAILY
Status: DISCONTINUED | OUTPATIENT
Start: 2021-05-20 | End: 2021-05-20

## 2021-05-20 RX ORDER — MIDODRINE HYDROCHLORIDE 2.5 MG/1
2.5 TABLET ORAL 3 TIMES DAILY
Status: DISCONTINUED | OUTPATIENT
Start: 2021-05-20 | End: 2021-06-01

## 2021-05-20 RX ORDER — LIDOCAINE HYDROCHLORIDE 10 MG/ML
INJECTION INFILTRATION; PERINEURAL
Status: DISPENSED
Start: 2021-05-20 | End: 2021-05-21

## 2021-05-20 RX ORDER — GABAPENTIN 100 MG/1
100 CAPSULE ORAL 2 TIMES DAILY
Status: DISCONTINUED | OUTPATIENT
Start: 2021-05-20 | End: 2021-05-20

## 2021-05-20 RX ORDER — ALBUMIN HUMAN 50 G/1000ML
25 SOLUTION INTRAVENOUS ONCE
Status: COMPLETED | OUTPATIENT
Start: 2021-05-20 | End: 2021-05-20

## 2021-05-20 RX ORDER — FUROSEMIDE 10 MG/ML
40 INJECTION INTRAMUSCULAR; INTRAVENOUS ONCE
Status: COMPLETED | OUTPATIENT
Start: 2021-05-20 | End: 2021-05-20

## 2021-05-20 RX ORDER — SODIUM BICARBONATE 650 MG/1
1950 TABLET ORAL 3 TIMES DAILY
Status: DISCONTINUED | OUTPATIENT
Start: 2021-05-20 | End: 2021-05-28

## 2021-05-20 RX ORDER — ACETAMINOPHEN 500 MG
1000 TABLET ORAL EVERY 8 HOURS
Status: DISCONTINUED | OUTPATIENT
Start: 2021-05-20 | End: 2021-05-22

## 2021-05-20 RX ORDER — SODIUM CHLORIDE 9 MG/ML
INJECTION, SOLUTION INTRAVENOUS ONCE
Status: DISCONTINUED | OUTPATIENT
Start: 2021-05-20 | End: 2021-05-21

## 2021-05-20 RX ORDER — SODIUM CHLORIDE 9 MG/ML
INJECTION, SOLUTION INTRAVENOUS CONTINUOUS
Status: DISCONTINUED | OUTPATIENT
Start: 2021-05-20 | End: 2021-05-20

## 2021-05-20 RX ORDER — FUROSEMIDE 10 MG/ML
120 INJECTION INTRAMUSCULAR; INTRAVENOUS ONCE
Status: COMPLETED | OUTPATIENT
Start: 2021-05-20 | End: 2021-05-20

## 2021-05-20 RX ADMIN — HEPARIN SODIUM 5000 UNITS: 5000 INJECTION INTRAVENOUS; SUBCUTANEOUS at 06:05

## 2021-05-20 RX ADMIN — NYSTATIN: 100000 POWDER TOPICAL at 08:05

## 2021-05-20 RX ADMIN — ACETAMINOPHEN 1000 MG: 500 TABLET, FILM COATED ORAL at 12:05

## 2021-05-20 RX ADMIN — DRONABINOL 2.5 MG: 2.5 CAPSULE ORAL at 08:05

## 2021-05-20 RX ADMIN — GABAPENTIN 300 MG: 300 CAPSULE ORAL at 08:05

## 2021-05-20 RX ADMIN — ASPIRIN 325 MG ORAL TABLET 325 MG: 325 PILL ORAL at 08:05

## 2021-05-20 RX ADMIN — SODIUM BICARBONATE 650 MG TABLET 1300 MG: at 08:05

## 2021-05-20 RX ADMIN — MIDODRINE HYDROCHLORIDE 2.5 MG: 2.5 TABLET ORAL at 02:05

## 2021-05-20 RX ADMIN — FUROSEMIDE 40 MG: 10 INJECTION, SOLUTION INTRAMUSCULAR; INTRAVENOUS at 08:05

## 2021-05-20 RX ADMIN — FUROSEMIDE 120 MG: 10 INJECTION, SOLUTION INTRAMUSCULAR; INTRAVENOUS at 12:05

## 2021-05-20 RX ADMIN — ROPIVACAINE HYDROCHLORIDE 8 ML/HR: 2 INJECTION, SOLUTION EPIDURAL; INFILTRATION at 10:05

## 2021-05-20 RX ADMIN — DOCUSATE SODIUM 50MG AND SENNOSIDES 8.6MG 1 TABLET: 8.6; 5 TABLET, FILM COATED ORAL at 08:05

## 2021-05-20 RX ADMIN — MIDODRINE HYDROCHLORIDE 2.5 MG: 2.5 TABLET ORAL at 08:05

## 2021-05-20 RX ADMIN — HEPARIN SODIUM 5000 UNITS: 5000 INJECTION INTRAVENOUS; SUBCUTANEOUS at 09:05

## 2021-05-20 RX ADMIN — DULOXETINE HYDROCHLORIDE 30 MG: 30 CAPSULE, DELAYED RELEASE ORAL at 09:05

## 2021-05-20 RX ADMIN — HEPARIN SODIUM 5000 UNITS: 5000 INJECTION INTRAVENOUS; SUBCUTANEOUS at 02:05

## 2021-05-20 RX ADMIN — ALBUMIN (HUMAN) 25 G: 12.5 SOLUTION INTRAVENOUS at 02:05

## 2021-05-20 RX ADMIN — CYCLOBENZAPRINE HYDROCHLORIDE 5 MG: 5 TABLET, FILM COATED ORAL at 02:05

## 2021-05-20 RX ADMIN — MEROPENEM 1 G: 1 INJECTION INTRAVENOUS at 10:05

## 2021-05-20 RX ADMIN — CYCLOBENZAPRINE HYDROCHLORIDE 5 MG: 5 TABLET, FILM COATED ORAL at 08:05

## 2021-05-20 RX ADMIN — SODIUM CHLORIDE, SODIUM LACTATE, POTASSIUM CHLORIDE, AND CALCIUM CHLORIDE 500 ML: .6; .31; .03; .02 INJECTION, SOLUTION INTRAVENOUS at 04:05

## 2021-05-20 RX ADMIN — SODIUM BICARBONATE 650 MG TABLET 1950 MG: at 02:05

## 2021-05-20 RX ADMIN — NYSTATIN: 100000 POWDER TOPICAL at 09:05

## 2021-05-21 LAB
ALBUMIN SERPL BCP-MCNC: 1.6 G/DL (ref 3.5–5.2)
ALP SERPL-CCNC: 530 U/L (ref 55–135)
ALT SERPL W/O P-5'-P-CCNC: 23 U/L (ref 10–44)
ANION GAP SERPL CALC-SCNC: 12 MMOL/L (ref 8–16)
ANION GAP SERPL CALC-SCNC: 9 MMOL/L (ref 8–16)
ANION GAP SERPL CALC-SCNC: 9 MMOL/L (ref 8–16)
AST SERPL-CCNC: 57 U/L (ref 10–40)
BACTERIA UR CULT: ABNORMAL
BASOPHILS # BLD AUTO: 0.05 K/UL (ref 0–0.2)
BASOPHILS NFR BLD: 0.2 % (ref 0–1.9)
BILIRUB SERPL-MCNC: 1.1 MG/DL (ref 0.1–1)
BUN SERPL-MCNC: 41 MG/DL (ref 6–20)
BUN SERPL-MCNC: 56 MG/DL (ref 6–20)
BUN SERPL-MCNC: 61 MG/DL (ref 6–20)
CALCIUM SERPL-MCNC: 7.3 MG/DL (ref 8.7–10.5)
CALCIUM SERPL-MCNC: 7.6 MG/DL (ref 8.7–10.5)
CALCIUM SERPL-MCNC: 8.1 MG/DL (ref 8.7–10.5)
CHLORIDE SERPL-SCNC: 102 MMOL/L (ref 95–110)
CHLORIDE SERPL-SCNC: 102 MMOL/L (ref 95–110)
CHLORIDE SERPL-SCNC: 99 MMOL/L (ref 95–110)
CO2 SERPL-SCNC: 22 MMOL/L (ref 23–29)
CO2 SERPL-SCNC: 22 MMOL/L (ref 23–29)
CO2 SERPL-SCNC: 23 MMOL/L (ref 23–29)
CREAT SERPL-MCNC: 1.4 MG/DL (ref 0.5–1.4)
CREAT SERPL-MCNC: 2.1 MG/DL (ref 0.5–1.4)
CREAT SERPL-MCNC: 2.2 MG/DL (ref 0.5–1.4)
DIFFERENTIAL METHOD: ABNORMAL
EOSINOPHIL # BLD AUTO: 0.2 K/UL (ref 0–0.5)
EOSINOPHIL NFR BLD: 0.8 % (ref 0–8)
ERYTHROCYTE [DISTWIDTH] IN BLOOD BY AUTOMATED COUNT: 17.7 % (ref 11.5–14.5)
EST. GFR  (AFRICAN AMERICAN): 28.5 ML/MIN/1.73 M^2
EST. GFR  (AFRICAN AMERICAN): 30.1 ML/MIN/1.73 M^2
EST. GFR  (AFRICAN AMERICAN): 49.1 ML/MIN/1.73 M^2
EST. GFR  (NON AFRICAN AMERICAN): 24.7 ML/MIN/1.73 M^2
EST. GFR  (NON AFRICAN AMERICAN): 26.1 ML/MIN/1.73 M^2
EST. GFR  (NON AFRICAN AMERICAN): 42.6 ML/MIN/1.73 M^2
GLUCOSE SERPL-MCNC: 62 MG/DL (ref 70–110)
GLUCOSE SERPL-MCNC: 72 MG/DL (ref 70–110)
GLUCOSE SERPL-MCNC: 79 MG/DL (ref 70–110)
HCT VFR BLD AUTO: 29.4 % (ref 37–48.5)
HGB BLD-MCNC: 9.7 G/DL (ref 12–16)
IMM GRANULOCYTES # BLD AUTO: 0.45 K/UL (ref 0–0.04)
IMM GRANULOCYTES NFR BLD AUTO: 2.2 % (ref 0–0.5)
LYMPHOCYTES # BLD AUTO: 1 K/UL (ref 1–4.8)
LYMPHOCYTES NFR BLD: 4.8 % (ref 18–48)
MAGNESIUM SERPL-MCNC: 1.9 MG/DL (ref 1.6–2.6)
MCH RBC QN AUTO: 29 PG (ref 27–31)
MCHC RBC AUTO-ENTMCNC: 33 G/DL (ref 32–36)
MCV RBC AUTO: 88 FL (ref 82–98)
MONOCYTES # BLD AUTO: 1.6 K/UL (ref 0.3–1)
MONOCYTES NFR BLD: 7.8 % (ref 4–15)
NEUTROPHILS # BLD AUTO: 17.5 K/UL (ref 1.8–7.7)
NEUTROPHILS NFR BLD: 84.2 % (ref 38–73)
NRBC BLD-RTO: 0 /100 WBC
PHOSPHATE SERPL-MCNC: 3.2 MG/DL (ref 2.7–4.5)
PLATELET # BLD AUTO: 262 K/UL (ref 150–450)
PMV BLD AUTO: 10.9 FL (ref 9.2–12.9)
POCT GLUCOSE: 71 MG/DL (ref 70–110)
POCT GLUCOSE: 83 MG/DL (ref 70–110)
POCT GLUCOSE: 83 MG/DL (ref 70–110)
POTASSIUM SERPL-SCNC: 3.5 MMOL/L (ref 3.5–5.1)
POTASSIUM SERPL-SCNC: 4.2 MMOL/L (ref 3.5–5.1)
POTASSIUM SERPL-SCNC: 4.4 MMOL/L (ref 3.5–5.1)
PROT SERPL-MCNC: 5.9 G/DL (ref 6–8.4)
RBC # BLD AUTO: 3.35 M/UL (ref 4–5.4)
SODIUM SERPL-SCNC: 133 MMOL/L (ref 136–145)
SODIUM SERPL-SCNC: 133 MMOL/L (ref 136–145)
SODIUM SERPL-SCNC: 134 MMOL/L (ref 136–145)
WBC # BLD AUTO: 20.77 K/UL (ref 3.9–12.7)

## 2021-05-21 PROCEDURE — 63600175 PHARM REV CODE 636 W HCPCS: Performed by: STUDENT IN AN ORGANIZED HEALTH CARE EDUCATION/TRAINING PROGRAM

## 2021-05-21 PROCEDURE — 25000003 PHARM REV CODE 250: Performed by: NURSE PRACTITIONER

## 2021-05-21 PROCEDURE — 25000003 PHARM REV CODE 250: Performed by: STUDENT IN AN ORGANIZED HEALTH CARE EDUCATION/TRAINING PROGRAM

## 2021-05-21 PROCEDURE — 99232 SBSQ HOSP IP/OBS MODERATE 35: CPT | Mod: GC,,, | Performed by: INTERNAL MEDICINE

## 2021-05-21 PROCEDURE — 80048 BASIC METABOLIC PNL TOTAL CA: CPT | Performed by: STUDENT IN AN ORGANIZED HEALTH CARE EDUCATION/TRAINING PROGRAM

## 2021-05-21 PROCEDURE — 83735 ASSAY OF MAGNESIUM: CPT | Performed by: STUDENT IN AN ORGANIZED HEALTH CARE EDUCATION/TRAINING PROGRAM

## 2021-05-21 PROCEDURE — 25000003 PHARM REV CODE 250: Performed by: INTERNAL MEDICINE

## 2021-05-21 PROCEDURE — 80053 COMPREHEN METABOLIC PANEL: CPT | Performed by: STUDENT IN AN ORGANIZED HEALTH CARE EDUCATION/TRAINING PROGRAM

## 2021-05-21 PROCEDURE — 99233 SBSQ HOSP IP/OBS HIGH 50: CPT | Mod: ,,, | Performed by: INTERNAL MEDICINE

## 2021-05-21 PROCEDURE — 97112 NEUROMUSCULAR REEDUCATION: CPT

## 2021-05-21 PROCEDURE — 99233 PR SUBSEQUENT HOSPITAL CARE,LEVL III: ICD-10-PCS | Mod: ,,, | Performed by: INTERNAL MEDICINE

## 2021-05-21 PROCEDURE — 99291 CRITICAL CARE FIRST HOUR: CPT | Mod: ,,, | Performed by: ANESTHESIOLOGY

## 2021-05-21 PROCEDURE — 85025 COMPLETE CBC W/AUTO DIFF WBC: CPT | Performed by: STUDENT IN AN ORGANIZED HEALTH CARE EDUCATION/TRAINING PROGRAM

## 2021-05-21 PROCEDURE — 80100014 HC HEMODIALYSIS 1:1

## 2021-05-21 PROCEDURE — 97164 PT RE-EVAL EST PLAN CARE: CPT

## 2021-05-21 PROCEDURE — 20000000 HC ICU ROOM

## 2021-05-21 PROCEDURE — 94761 N-INVAS EAR/PLS OXIMETRY MLT: CPT

## 2021-05-21 PROCEDURE — 97530 THERAPEUTIC ACTIVITIES: CPT

## 2021-05-21 PROCEDURE — 84100 ASSAY OF PHOSPHORUS: CPT | Performed by: STUDENT IN AN ORGANIZED HEALTH CARE EDUCATION/TRAINING PROGRAM

## 2021-05-21 PROCEDURE — 99232 PR SUBSEQUENT HOSPITAL CARE,LEVL II: ICD-10-PCS | Mod: GC,,, | Performed by: INTERNAL MEDICINE

## 2021-05-21 PROCEDURE — 97168 OT RE-EVAL EST PLAN CARE: CPT

## 2021-05-21 PROCEDURE — 99291 PR CRITICAL CARE, E/M 30-74 MINUTES: ICD-10-PCS | Mod: ,,, | Performed by: ANESTHESIOLOGY

## 2021-05-21 RX ORDER — LEVOFLOXACIN 750 MG/1
750 TABLET ORAL EVERY OTHER DAY
Status: DISCONTINUED | OUTPATIENT
Start: 2021-05-21 | End: 2021-05-21

## 2021-05-21 RX ORDER — SODIUM CHLORIDE 9 MG/ML
INJECTION, SOLUTION INTRAVENOUS ONCE
Status: DISCONTINUED | OUTPATIENT
Start: 2021-05-21 | End: 2021-05-22

## 2021-05-21 RX ORDER — CEFEPIME HYDROCHLORIDE 1 G/1
1 INJECTION, POWDER, FOR SOLUTION INTRAMUSCULAR; INTRAVENOUS
Status: DISCONTINUED | OUTPATIENT
Start: 2021-05-21 | End: 2021-05-21

## 2021-05-21 RX ORDER — HYDROMORPHONE HYDROCHLORIDE 1 MG/ML
0.5 INJECTION, SOLUTION INTRAMUSCULAR; INTRAVENOUS; SUBCUTANEOUS ONCE AS NEEDED
Status: COMPLETED | OUTPATIENT
Start: 2021-05-21 | End: 2021-05-21

## 2021-05-21 RX ORDER — LEVOFLOXACIN 5 MG/ML
750 INJECTION, SOLUTION INTRAVENOUS
Status: DISCONTINUED | OUTPATIENT
Start: 2021-05-21 | End: 2021-05-25

## 2021-05-21 RX ORDER — SODIUM CHLORIDE 9 MG/ML
INJECTION, SOLUTION INTRAVENOUS CONTINUOUS
Status: DISCONTINUED | OUTPATIENT
Start: 2021-05-21 | End: 2021-05-27

## 2021-05-21 RX ADMIN — DEXTROSE MONOHYDRATE 12.5 G: 25 INJECTION, SOLUTION INTRAVENOUS at 04:05

## 2021-05-21 RX ADMIN — NYSTATIN: 100000 POWDER TOPICAL at 10:05

## 2021-05-21 RX ADMIN — NYSTATIN: 100000 POWDER TOPICAL at 08:05

## 2021-05-21 RX ADMIN — HEPARIN SODIUM 5000 UNITS: 5000 INJECTION INTRAVENOUS; SUBCUTANEOUS at 08:05

## 2021-05-21 RX ADMIN — HYDROMORPHONE HYDROCHLORIDE 0.5 MG: 1 INJECTION, SOLUTION INTRAMUSCULAR; INTRAVENOUS; SUBCUTANEOUS at 08:05

## 2021-05-21 RX ADMIN — CEFEPIME 1 G: 1 INJECTION, POWDER, FOR SOLUTION INTRAMUSCULAR; INTRAVENOUS at 06:05

## 2021-05-21 RX ADMIN — LEVOFLOXACIN 750 MG: 750 INJECTION, SOLUTION INTRAVENOUS at 04:05

## 2021-05-21 RX ADMIN — HEPARIN SODIUM 5000 UNITS: 5000 INJECTION INTRAVENOUS; SUBCUTANEOUS at 02:05

## 2021-05-21 RX ADMIN — HEPARIN SODIUM 5000 UNITS: 5000 INJECTION INTRAVENOUS; SUBCUTANEOUS at 06:05

## 2021-05-22 LAB
ABO + RH BLD: NORMAL
ALBUMIN SERPL BCP-MCNC: 1.3 G/DL (ref 3.5–5.2)
ALP SERPL-CCNC: 594 U/L (ref 55–135)
ALT SERPL W/O P-5'-P-CCNC: 22 U/L (ref 10–44)
ANION GAP SERPL CALC-SCNC: 7 MMOL/L (ref 8–16)
ANION GAP SERPL CALC-SCNC: 8 MMOL/L (ref 8–16)
AST SERPL-CCNC: 65 U/L (ref 10–40)
BASOPHILS # BLD AUTO: 0.04 K/UL (ref 0–0.2)
BASOPHILS NFR BLD: 0.2 % (ref 0–1.9)
BILIRUB SERPL-MCNC: 1.1 MG/DL (ref 0.1–1)
BLD GP AB SCN CELLS X3 SERPL QL: NORMAL
BUN SERPL-MCNC: 30 MG/DL (ref 6–20)
BUN SERPL-MCNC: 31 MG/DL (ref 6–20)
BUN SERPL-MCNC: 33 MG/DL (ref 6–20)
BUN SERPL-MCNC: 35 MG/DL (ref 6–20)
CALCIUM SERPL-MCNC: 7.5 MG/DL (ref 8.7–10.5)
CALCIUM SERPL-MCNC: 7.5 MG/DL (ref 8.7–10.5)
CALCIUM SERPL-MCNC: 7.8 MG/DL (ref 8.7–10.5)
CALCIUM SERPL-MCNC: 8 MG/DL (ref 8.7–10.5)
CHLORIDE SERPL-SCNC: 103 MMOL/L (ref 95–110)
CHLORIDE SERPL-SCNC: 103 MMOL/L (ref 95–110)
CHLORIDE SERPL-SCNC: 104 MMOL/L (ref 95–110)
CHLORIDE SERPL-SCNC: 105 MMOL/L (ref 95–110)
CO2 SERPL-SCNC: 26 MMOL/L (ref 23–29)
CO2 SERPL-SCNC: 27 MMOL/L (ref 23–29)
CREAT SERPL-MCNC: 1.1 MG/DL (ref 0.5–1.4)
CREAT SERPL-MCNC: 1.2 MG/DL (ref 0.5–1.4)
CREAT SERPL-MCNC: 1.4 MG/DL (ref 0.5–1.4)
CREAT SERPL-MCNC: 1.5 MG/DL (ref 0.5–1.4)
DIFFERENTIAL METHOD: ABNORMAL
EOSINOPHIL # BLD AUTO: 0 K/UL (ref 0–0.5)
EOSINOPHIL NFR BLD: 0.1 % (ref 0–8)
ERYTHROCYTE [DISTWIDTH] IN BLOOD BY AUTOMATED COUNT: 17.4 % (ref 11.5–14.5)
EST. GFR  (AFRICAN AMERICAN): 45.2 ML/MIN/1.73 M^2
EST. GFR  (AFRICAN AMERICAN): 49.1 ML/MIN/1.73 M^2
EST. GFR  (AFRICAN AMERICAN): 59.2 ML/MIN/1.73 M^2
EST. GFR  (AFRICAN AMERICAN): >60 ML/MIN/1.73 M^2
EST. GFR  (NON AFRICAN AMERICAN): 39.2 ML/MIN/1.73 M^2
EST. GFR  (NON AFRICAN AMERICAN): 42.6 ML/MIN/1.73 M^2
EST. GFR  (NON AFRICAN AMERICAN): 51.4 ML/MIN/1.73 M^2
EST. GFR  (NON AFRICAN AMERICAN): 57.1 ML/MIN/1.73 M^2
GLUCOSE SERPL-MCNC: 69 MG/DL (ref 70–110)
GLUCOSE SERPL-MCNC: 73 MG/DL (ref 70–110)
GLUCOSE SERPL-MCNC: 76 MG/DL (ref 70–110)
GLUCOSE SERPL-MCNC: 85 MG/DL (ref 70–110)
HCT VFR BLD AUTO: 26.8 % (ref 37–48.5)
HGB BLD-MCNC: 8.8 G/DL (ref 12–16)
IMM GRANULOCYTES # BLD AUTO: 0.29 K/UL (ref 0–0.04)
IMM GRANULOCYTES NFR BLD AUTO: 1.8 % (ref 0–0.5)
LYMPHOCYTES # BLD AUTO: 0.7 K/UL (ref 1–4.8)
LYMPHOCYTES NFR BLD: 4.3 % (ref 18–48)
MAGNESIUM SERPL-MCNC: 1.9 MG/DL (ref 1.6–2.6)
MCH RBC QN AUTO: 28.9 PG (ref 27–31)
MCHC RBC AUTO-ENTMCNC: 32.8 G/DL (ref 32–36)
MCV RBC AUTO: 88 FL (ref 82–98)
MONOCYTES # BLD AUTO: 1.7 K/UL (ref 0.3–1)
MONOCYTES NFR BLD: 10.3 % (ref 4–15)
NEUTROPHILS # BLD AUTO: 13.7 K/UL (ref 1.8–7.7)
NEUTROPHILS NFR BLD: 83.3 % (ref 38–73)
NRBC BLD-RTO: 0 /100 WBC
PHOSPHATE SERPL-MCNC: 2.9 MG/DL (ref 2.7–4.5)
PLATELET # BLD AUTO: 216 K/UL (ref 150–450)
PMV BLD AUTO: 11.2 FL (ref 9.2–12.9)
POCT GLUCOSE: 69 MG/DL (ref 70–110)
POCT GLUCOSE: 74 MG/DL (ref 70–110)
POCT GLUCOSE: 87 MG/DL (ref 70–110)
POCT GLUCOSE: 96 MG/DL (ref 70–110)
POCT GLUCOSE: 96 MG/DL (ref 70–110)
POCT GLUCOSE: 99 MG/DL (ref 70–110)
POTASSIUM SERPL-SCNC: 3.8 MMOL/L (ref 3.5–5.1)
POTASSIUM SERPL-SCNC: 3.9 MMOL/L (ref 3.5–5.1)
POTASSIUM SERPL-SCNC: 4.1 MMOL/L (ref 3.5–5.1)
POTASSIUM SERPL-SCNC: 4.2 MMOL/L (ref 3.5–5.1)
PROT SERPL-MCNC: 5.1 G/DL (ref 6–8.4)
RBC # BLD AUTO: 3.04 M/UL (ref 4–5.4)
SODIUM SERPL-SCNC: 137 MMOL/L (ref 136–145)
SODIUM SERPL-SCNC: 137 MMOL/L (ref 136–145)
SODIUM SERPL-SCNC: 138 MMOL/L (ref 136–145)
SODIUM SERPL-SCNC: 139 MMOL/L (ref 136–145)
WBC # BLD AUTO: 16.44 K/UL (ref 3.9–12.7)

## 2021-05-22 PROCEDURE — 25000003 PHARM REV CODE 250: Performed by: INTERNAL MEDICINE

## 2021-05-22 PROCEDURE — 80048 BASIC METABOLIC PNL TOTAL CA: CPT | Performed by: STUDENT IN AN ORGANIZED HEALTH CARE EDUCATION/TRAINING PROGRAM

## 2021-05-22 PROCEDURE — 63600175 PHARM REV CODE 636 W HCPCS: Performed by: STUDENT IN AN ORGANIZED HEALTH CARE EDUCATION/TRAINING PROGRAM

## 2021-05-22 PROCEDURE — 99024 POSTOP FOLLOW-UP VISIT: CPT | Mod: POP,,, | Performed by: SURGERY

## 2021-05-22 PROCEDURE — 99024 PR POST-OP FOLLOW-UP VISIT: ICD-10-PCS | Mod: POP,,, | Performed by: SURGERY

## 2021-05-22 PROCEDURE — 80053 COMPREHEN METABOLIC PANEL: CPT | Performed by: STUDENT IN AN ORGANIZED HEALTH CARE EDUCATION/TRAINING PROGRAM

## 2021-05-22 PROCEDURE — 25000003 PHARM REV CODE 250: Performed by: STUDENT IN AN ORGANIZED HEALTH CARE EDUCATION/TRAINING PROGRAM

## 2021-05-22 PROCEDURE — 80048 BASIC METABOLIC PNL TOTAL CA: CPT | Mod: 91 | Performed by: STUDENT IN AN ORGANIZED HEALTH CARE EDUCATION/TRAINING PROGRAM

## 2021-05-22 PROCEDURE — 86920 COMPATIBILITY TEST SPIN: CPT | Performed by: STUDENT IN AN ORGANIZED HEALTH CARE EDUCATION/TRAINING PROGRAM

## 2021-05-22 PROCEDURE — 99233 SBSQ HOSP IP/OBS HIGH 50: CPT | Mod: ,,, | Performed by: ANESTHESIOLOGY

## 2021-05-22 PROCEDURE — 94761 N-INVAS EAR/PLS OXIMETRY MLT: CPT

## 2021-05-22 PROCEDURE — 99232 PR SUBSEQUENT HOSPITAL CARE,LEVL II: ICD-10-PCS | Mod: ,,, | Performed by: INTERNAL MEDICINE

## 2021-05-22 PROCEDURE — 86900 BLOOD TYPING SEROLOGIC ABO: CPT | Performed by: SURGERY

## 2021-05-22 PROCEDURE — 20600001 HC STEP DOWN PRIVATE ROOM

## 2021-05-22 PROCEDURE — 99232 SBSQ HOSP IP/OBS MODERATE 35: CPT | Mod: ,,, | Performed by: INTERNAL MEDICINE

## 2021-05-22 PROCEDURE — 85025 COMPLETE CBC W/AUTO DIFF WBC: CPT | Performed by: STUDENT IN AN ORGANIZED HEALTH CARE EDUCATION/TRAINING PROGRAM

## 2021-05-22 PROCEDURE — 99233 PR SUBSEQUENT HOSPITAL CARE,LEVL III: ICD-10-PCS | Mod: ,,, | Performed by: ANESTHESIOLOGY

## 2021-05-22 PROCEDURE — 84100 ASSAY OF PHOSPHORUS: CPT | Performed by: STUDENT IN AN ORGANIZED HEALTH CARE EDUCATION/TRAINING PROGRAM

## 2021-05-22 PROCEDURE — 83735 ASSAY OF MAGNESIUM: CPT | Performed by: STUDENT IN AN ORGANIZED HEALTH CARE EDUCATION/TRAINING PROGRAM

## 2021-05-22 PROCEDURE — 25000003 PHARM REV CODE 250: Performed by: NURSE PRACTITIONER

## 2021-05-22 RX ORDER — LIDOCAINE HYDROCHLORIDE 20 MG/ML
5 INJECTION, SOLUTION INFILTRATION; PERINEURAL
Status: DISCONTINUED | OUTPATIENT
Start: 2021-05-22 | End: 2021-06-08 | Stop reason: HOSPADM

## 2021-05-22 RX ORDER — ACETAMINOPHEN 650 MG/1
650 SUPPOSITORY RECTAL EVERY 6 HOURS
Status: DISCONTINUED | OUTPATIENT
Start: 2021-05-22 | End: 2021-05-22

## 2021-05-22 RX ORDER — ACETAMINOPHEN 500 MG
1000 TABLET ORAL EVERY 8 HOURS
Status: DISPENSED | OUTPATIENT
Start: 2021-05-22 | End: 2021-05-25

## 2021-05-22 RX ADMIN — SODIUM BICARBONATE 650 MG TABLET 1950 MG: at 09:05

## 2021-05-22 RX ADMIN — HEPARIN SODIUM 5000 UNITS: 5000 INJECTION INTRAVENOUS; SUBCUTANEOUS at 06:05

## 2021-05-22 RX ADMIN — ROPIVACAINE HYDROCHLORIDE 8 ML/HR: 2 INJECTION, SOLUTION EPIDURAL; INFILTRATION at 12:05

## 2021-05-22 RX ADMIN — MIDODRINE HYDROCHLORIDE 2.5 MG: 2.5 TABLET ORAL at 09:05

## 2021-05-22 RX ADMIN — DEXTROSE MONOHYDRATE 12.5 G: 25 INJECTION, SOLUTION INTRAVENOUS at 09:05

## 2021-05-22 RX ADMIN — ACETAMINOPHEN 1000 MG: 500 TABLET, FILM COATED ORAL at 09:05

## 2021-05-22 RX ADMIN — NYSTATIN: 100000 POWDER TOPICAL at 09:05

## 2021-05-22 RX ADMIN — HEPARIN SODIUM 5000 UNITS: 5000 INJECTION INTRAVENOUS; SUBCUTANEOUS at 09:05

## 2021-05-22 RX ADMIN — ATORVASTATIN CALCIUM 80 MG: 20 TABLET, FILM COATED ORAL at 09:05

## 2021-05-22 RX ADMIN — HEPARIN SODIUM 5000 UNITS: 5000 INJECTION INTRAVENOUS; SUBCUTANEOUS at 02:05

## 2021-05-23 LAB
ALBUMIN SERPL BCP-MCNC: 1 G/DL (ref 3.5–5.2)
ALBUMIN SERPL BCP-MCNC: 1.1 G/DL (ref 3.5–5.2)
ALP SERPL-CCNC: 740 U/L (ref 55–135)
ALP SERPL-CCNC: 981 U/L (ref 55–135)
ALT SERPL W/O P-5'-P-CCNC: 25 U/L (ref 10–44)
ALT SERPL W/O P-5'-P-CCNC: 29 U/L (ref 10–44)
ANION GAP SERPL CALC-SCNC: 10 MMOL/L (ref 8–16)
ANION GAP SERPL CALC-SCNC: 10 MMOL/L (ref 8–16)
ANION GAP SERPL CALC-SCNC: 11 MMOL/L (ref 8–16)
ANION GAP SERPL CALC-SCNC: 9 MMOL/L (ref 8–16)
ANION GAP SERPL CALC-SCNC: 9 MMOL/L (ref 8–16)
AST SERPL-CCNC: 77 U/L (ref 10–40)
AST SERPL-CCNC: 89 U/L (ref 10–40)
BACTERIA BLD CULT: NORMAL
BASOPHILS # BLD AUTO: 0.02 K/UL (ref 0–0.2)
BASOPHILS NFR BLD: 0.1 % (ref 0–1.9)
BILIRUB SERPL-MCNC: 0.7 MG/DL (ref 0.1–1)
BILIRUB SERPL-MCNC: 1.2 MG/DL (ref 0.1–1)
BUN SERPL-MCNC: 40 MG/DL (ref 6–20)
BUN SERPL-MCNC: 41 MG/DL (ref 6–20)
BUN SERPL-MCNC: 42 MG/DL (ref 6–20)
BUN SERPL-MCNC: 46 MG/DL (ref 6–20)
BUN SERPL-MCNC: 47 MG/DL (ref 6–20)
CALCIUM SERPL-MCNC: 7 MG/DL (ref 8.7–10.5)
CALCIUM SERPL-MCNC: 7 MG/DL (ref 8.7–10.5)
CALCIUM SERPL-MCNC: 7.1 MG/DL (ref 8.7–10.5)
CALCIUM SERPL-MCNC: 7.1 MG/DL (ref 8.7–10.5)
CALCIUM SERPL-MCNC: 7.4 MG/DL (ref 8.7–10.5)
CHLORIDE SERPL-SCNC: 104 MMOL/L (ref 95–110)
CHLORIDE SERPL-SCNC: 105 MMOL/L (ref 95–110)
CHLORIDE SERPL-SCNC: 106 MMOL/L (ref 95–110)
CO2 SERPL-SCNC: 23 MMOL/L (ref 23–29)
CO2 SERPL-SCNC: 23 MMOL/L (ref 23–29)
CO2 SERPL-SCNC: 24 MMOL/L (ref 23–29)
CO2 SERPL-SCNC: 24 MMOL/L (ref 23–29)
CO2 SERPL-SCNC: 25 MMOL/L (ref 23–29)
CREAT SERPL-MCNC: 1.6 MG/DL (ref 0.5–1.4)
CREAT SERPL-MCNC: 1.7 MG/DL (ref 0.5–1.4)
CREAT SERPL-MCNC: 1.8 MG/DL (ref 0.5–1.4)
DIFFERENTIAL METHOD: ABNORMAL
EOSINOPHIL # BLD AUTO: 0 K/UL (ref 0–0.5)
EOSINOPHIL NFR BLD: 0.2 % (ref 0–8)
ERYTHROCYTE [DISTWIDTH] IN BLOOD BY AUTOMATED COUNT: 17.7 % (ref 11.5–14.5)
EST. GFR  (AFRICAN AMERICAN): 36.3 ML/MIN/1.73 M^2
EST. GFR  (AFRICAN AMERICAN): 38.9 ML/MIN/1.73 M^2
EST. GFR  (AFRICAN AMERICAN): 41.8 ML/MIN/1.73 M^2
EST. GFR  (NON AFRICAN AMERICAN): 31.5 ML/MIN/1.73 M^2
EST. GFR  (NON AFRICAN AMERICAN): 33.7 ML/MIN/1.73 M^2
EST. GFR  (NON AFRICAN AMERICAN): 36.3 ML/MIN/1.73 M^2
GLUCOSE SERPL-MCNC: 115 MG/DL (ref 70–110)
GLUCOSE SERPL-MCNC: 215 MG/DL (ref 70–110)
GLUCOSE SERPL-MCNC: 224 MG/DL (ref 70–110)
GLUCOSE SERPL-MCNC: 93 MG/DL (ref 70–110)
GLUCOSE SERPL-MCNC: 99 MG/DL (ref 70–110)
HCT VFR BLD AUTO: 24.3 % (ref 37–48.5)
HGB BLD-MCNC: 8.1 G/DL (ref 12–16)
IMM GRANULOCYTES # BLD AUTO: 0.27 K/UL (ref 0–0.04)
IMM GRANULOCYTES NFR BLD AUTO: 1.7 % (ref 0–0.5)
LYMPHOCYTES # BLD AUTO: 1.2 K/UL (ref 1–4.8)
LYMPHOCYTES NFR BLD: 7.1 % (ref 18–48)
MAGNESIUM SERPL-MCNC: 2 MG/DL (ref 1.6–2.6)
MAGNESIUM SERPL-MCNC: 2.1 MG/DL (ref 1.6–2.6)
MCH RBC QN AUTO: 29.7 PG (ref 27–31)
MCHC RBC AUTO-ENTMCNC: 33.3 G/DL (ref 32–36)
MCV RBC AUTO: 89 FL (ref 82–98)
MONOCYTES # BLD AUTO: 1.9 K/UL (ref 0.3–1)
MONOCYTES NFR BLD: 11.4 % (ref 4–15)
NEUTROPHILS # BLD AUTO: 12.9 K/UL (ref 1.8–7.7)
NEUTROPHILS NFR BLD: 79.5 % (ref 38–73)
NRBC BLD-RTO: 0 /100 WBC
PHOSPHATE SERPL-MCNC: 2.6 MG/DL (ref 2.7–4.5)
PHOSPHATE SERPL-MCNC: 3.2 MG/DL (ref 2.7–4.5)
PLATELET # BLD AUTO: 148 K/UL (ref 150–450)
PMV BLD AUTO: 12.2 FL (ref 9.2–12.9)
POCT GLUCOSE: 138 MG/DL (ref 70–110)
POCT GLUCOSE: 196 MG/DL (ref 70–110)
POTASSIUM SERPL-SCNC: 4.1 MMOL/L (ref 3.5–5.1)
POTASSIUM SERPL-SCNC: 4.1 MMOL/L (ref 3.5–5.1)
POTASSIUM SERPL-SCNC: 4.2 MMOL/L (ref 3.5–5.1)
PROT SERPL-MCNC: 4.5 G/DL (ref 6–8.4)
PROT SERPL-MCNC: 4.7 G/DL (ref 6–8.4)
RBC # BLD AUTO: 2.73 M/UL (ref 4–5.4)
SODIUM SERPL-SCNC: 138 MMOL/L (ref 136–145)
SODIUM SERPL-SCNC: 139 MMOL/L (ref 136–145)
SODIUM SERPL-SCNC: 140 MMOL/L (ref 136–145)
WBC # BLD AUTO: 16.26 K/UL (ref 3.9–12.7)

## 2021-05-23 PROCEDURE — 25000003 PHARM REV CODE 250: Performed by: INTERNAL MEDICINE

## 2021-05-23 PROCEDURE — 63600175 PHARM REV CODE 636 W HCPCS: Performed by: STUDENT IN AN ORGANIZED HEALTH CARE EDUCATION/TRAINING PROGRAM

## 2021-05-23 PROCEDURE — 85025 COMPLETE CBC W/AUTO DIFF WBC: CPT | Performed by: STUDENT IN AN ORGANIZED HEALTH CARE EDUCATION/TRAINING PROGRAM

## 2021-05-23 PROCEDURE — 99024 POSTOP FOLLOW-UP VISIT: CPT | Mod: POP,,, | Performed by: SURGERY

## 2021-05-23 PROCEDURE — 25000003 PHARM REV CODE 250: Performed by: STUDENT IN AN ORGANIZED HEALTH CARE EDUCATION/TRAINING PROGRAM

## 2021-05-23 PROCEDURE — 99024 PR POST-OP FOLLOW-UP VISIT: ICD-10-PCS | Mod: POP,,, | Performed by: SURGERY

## 2021-05-23 PROCEDURE — 83735 ASSAY OF MAGNESIUM: CPT | Mod: 91 | Performed by: STUDENT IN AN ORGANIZED HEALTH CARE EDUCATION/TRAINING PROGRAM

## 2021-05-23 PROCEDURE — 84100 ASSAY OF PHOSPHORUS: CPT | Mod: 91 | Performed by: STUDENT IN AN ORGANIZED HEALTH CARE EDUCATION/TRAINING PROGRAM

## 2021-05-23 PROCEDURE — 80053 COMPREHEN METABOLIC PANEL: CPT | Mod: 91 | Performed by: STUDENT IN AN ORGANIZED HEALTH CARE EDUCATION/TRAINING PROGRAM

## 2021-05-23 PROCEDURE — 83735 ASSAY OF MAGNESIUM: CPT | Performed by: STUDENT IN AN ORGANIZED HEALTH CARE EDUCATION/TRAINING PROGRAM

## 2021-05-23 PROCEDURE — 80048 BASIC METABOLIC PNL TOTAL CA: CPT | Performed by: STUDENT IN AN ORGANIZED HEALTH CARE EDUCATION/TRAINING PROGRAM

## 2021-05-23 PROCEDURE — 80048 BASIC METABOLIC PNL TOTAL CA: CPT | Mod: 91 | Performed by: STUDENT IN AN ORGANIZED HEALTH CARE EDUCATION/TRAINING PROGRAM

## 2021-05-23 PROCEDURE — 97535 SELF CARE MNGMENT TRAINING: CPT

## 2021-05-23 PROCEDURE — 84100 ASSAY OF PHOSPHORUS: CPT | Performed by: STUDENT IN AN ORGANIZED HEALTH CARE EDUCATION/TRAINING PROGRAM

## 2021-05-23 PROCEDURE — 20600001 HC STEP DOWN PRIVATE ROOM

## 2021-05-23 PROCEDURE — 92610 EVALUATE SWALLOWING FUNCTION: CPT

## 2021-05-23 PROCEDURE — 80053 COMPREHEN METABOLIC PANEL: CPT | Performed by: STUDENT IN AN ORGANIZED HEALTH CARE EDUCATION/TRAINING PROGRAM

## 2021-05-23 RX ADMIN — NYSTATIN: 100000 POWDER TOPICAL at 08:05

## 2021-05-23 RX ADMIN — LEVOFLOXACIN 750 MG: 750 INJECTION, SOLUTION INTRAVENOUS at 04:05

## 2021-05-23 RX ADMIN — HEPARIN SODIUM 5000 UNITS: 5000 INJECTION INTRAVENOUS; SUBCUTANEOUS at 03:05

## 2021-05-23 RX ADMIN — SODIUM BICARBONATE 650 MG TABLET 1950 MG: at 09:05

## 2021-05-23 RX ADMIN — HEPARIN SODIUM 5000 UNITS: 5000 INJECTION INTRAVENOUS; SUBCUTANEOUS at 09:05

## 2021-05-23 RX ADMIN — ACETAMINOPHEN 1000 MG: 500 TABLET, FILM COATED ORAL at 03:05

## 2021-05-23 RX ADMIN — SODIUM BICARBONATE 650 MG TABLET 1950 MG: at 03:05

## 2021-05-23 RX ADMIN — NYSTATIN: 100000 POWDER TOPICAL at 09:05

## 2021-05-23 RX ADMIN — DOCUSATE SODIUM 50MG AND SENNOSIDES 8.6MG 1 TABLET: 8.6; 5 TABLET, FILM COATED ORAL at 08:05

## 2021-05-23 RX ADMIN — SODIUM BICARBONATE 650 MG TABLET 1950 MG: at 08:05

## 2021-05-23 RX ADMIN — MIDODRINE HYDROCHLORIDE 2.5 MG: 2.5 TABLET ORAL at 08:05

## 2021-05-23 RX ADMIN — ASPIRIN 325 MG ORAL TABLET 325 MG: 325 PILL ORAL at 08:05

## 2021-05-23 RX ADMIN — HEPARIN SODIUM 5000 UNITS: 5000 INJECTION INTRAVENOUS; SUBCUTANEOUS at 06:05

## 2021-05-23 RX ADMIN — MIDODRINE HYDROCHLORIDE 2.5 MG: 2.5 TABLET ORAL at 09:05

## 2021-05-23 RX ADMIN — ATORVASTATIN CALCIUM 80 MG: 20 TABLET, FILM COATED ORAL at 09:05

## 2021-05-23 RX ADMIN — MIDODRINE HYDROCHLORIDE 2.5 MG: 2.5 TABLET ORAL at 03:05

## 2021-05-23 RX ADMIN — ACETAMINOPHEN 1000 MG: 500 TABLET, FILM COATED ORAL at 06:05

## 2021-05-23 RX ADMIN — SODIUM PHOSPHATE, MONOBASIC, MONOHYDRATE AND SODIUM PHOSPHATE, DIBASIC, ANHYDROUS 20.01 MMOL: 276; 142 INJECTION, SOLUTION INTRAVENOUS at 11:05

## 2021-05-23 RX ADMIN — ROPIVACAINE HYDROCHLORIDE 8 ML/HR: 2 INJECTION, SOLUTION EPIDURAL; INFILTRATION at 03:05

## 2021-05-24 ENCOUNTER — PATIENT OUTREACH (OUTPATIENT)
Dept: ADMINISTRATIVE | Facility: OTHER | Age: 56
End: 2021-05-24

## 2021-05-24 PROBLEM — R74.8 ELEVATED SERUM ALKALINE PHOSPHATASE LEVEL: Status: ACTIVE | Noted: 2021-05-24

## 2021-05-24 PROBLEM — E66.812 CLASS 2 SEVERE OBESITY DUE TO EXCESS CALORIES WITH SERIOUS COMORBIDITY IN ADULT: Status: ACTIVE | Noted: 2021-05-24

## 2021-05-24 PROBLEM — Z78.9 ON ENTERAL NUTRITION: Status: ACTIVE | Noted: 2021-05-24

## 2021-05-24 PROBLEM — E66.01 CLASS 2 SEVERE OBESITY DUE TO EXCESS CALORIES WITH SERIOUS COMORBIDITY IN ADULT: Status: ACTIVE | Noted: 2021-05-24

## 2021-05-24 LAB
ALBUMIN SERPL BCP-MCNC: 1 G/DL (ref 3.5–5.2)
ALBUMIN SERPL BCP-MCNC: 1 G/DL (ref 3.5–5.2)
ALP SERPL-CCNC: 1055 U/L (ref 55–135)
ALP SERPL-CCNC: 1175 U/L (ref 55–135)
ALT SERPL W/O P-5'-P-CCNC: 35 U/L (ref 10–44)
ALT SERPL W/O P-5'-P-CCNC: 42 U/L (ref 10–44)
ANION GAP SERPL CALC-SCNC: 6 MMOL/L (ref 8–16)
ANION GAP SERPL CALC-SCNC: 7 MMOL/L (ref 8–16)
ANION GAP SERPL CALC-SCNC: 9 MMOL/L (ref 8–16)
AST SERPL-CCNC: 126 U/L (ref 10–40)
AST SERPL-CCNC: 98 U/L (ref 10–40)
BASOPHILS # BLD AUTO: 0.01 K/UL (ref 0–0.2)
BASOPHILS # BLD AUTO: 0.04 K/UL (ref 0–0.2)
BASOPHILS NFR BLD: 0.1 % (ref 0–1.9)
BASOPHILS NFR BLD: 0.2 % (ref 0–1.9)
BILIRUB SERPL-MCNC: 0.4 MG/DL (ref 0.1–1)
BILIRUB SERPL-MCNC: 0.5 MG/DL (ref 0.1–1)
BLD PROD TYP BPU: NORMAL
BLD PROD TYP BPU: NORMAL
BLOOD UNIT EXPIRATION DATE: NORMAL
BLOOD UNIT EXPIRATION DATE: NORMAL
BLOOD UNIT TYPE CODE: 5100
BLOOD UNIT TYPE CODE: 5100
BLOOD UNIT TYPE: NORMAL
BLOOD UNIT TYPE: NORMAL
BUN SERPL-MCNC: 48 MG/DL (ref 6–20)
BUN SERPL-MCNC: 49 MG/DL (ref 6–20)
BUN SERPL-MCNC: 52 MG/DL (ref 6–20)
CALCIUM SERPL-MCNC: 6.9 MG/DL (ref 8.7–10.5)
CALCIUM SERPL-MCNC: 6.9 MG/DL (ref 8.7–10.5)
CALCIUM SERPL-MCNC: 7.8 MG/DL (ref 8.7–10.5)
CHLORIDE SERPL-SCNC: 106 MMOL/L (ref 95–110)
CHLORIDE SERPL-SCNC: 107 MMOL/L (ref 95–110)
CHLORIDE SERPL-SCNC: 109 MMOL/L (ref 95–110)
CO2 SERPL-SCNC: 24 MMOL/L (ref 23–29)
CO2 SERPL-SCNC: 27 MMOL/L (ref 23–29)
CO2 SERPL-SCNC: 28 MMOL/L (ref 23–29)
CODING SYSTEM: NORMAL
CODING SYSTEM: NORMAL
CREAT SERPL-MCNC: 1.5 MG/DL (ref 0.5–1.4)
CREAT SERPL-MCNC: 1.6 MG/DL (ref 0.5–1.4)
CREAT SERPL-MCNC: 1.7 MG/DL (ref 0.5–1.4)
DIFFERENTIAL METHOD: ABNORMAL
DIFFERENTIAL METHOD: ABNORMAL
DISPENSE STATUS: NORMAL
DISPENSE STATUS: NORMAL
EOSINOPHIL # BLD AUTO: 0 K/UL (ref 0–0.5)
EOSINOPHIL # BLD AUTO: 0.1 K/UL (ref 0–0.5)
EOSINOPHIL NFR BLD: 0.1 % (ref 0–8)
EOSINOPHIL NFR BLD: 0.4 % (ref 0–8)
ERYTHROCYTE [DISTWIDTH] IN BLOOD BY AUTOMATED COUNT: 16.4 % (ref 11.5–14.5)
ERYTHROCYTE [DISTWIDTH] IN BLOOD BY AUTOMATED COUNT: 17.7 % (ref 11.5–14.5)
EST. GFR  (AFRICAN AMERICAN): 38.9 ML/MIN/1.73 M^2
EST. GFR  (AFRICAN AMERICAN): 41.8 ML/MIN/1.73 M^2
EST. GFR  (AFRICAN AMERICAN): 45.2 ML/MIN/1.73 M^2
EST. GFR  (NON AFRICAN AMERICAN): 33.7 ML/MIN/1.73 M^2
EST. GFR  (NON AFRICAN AMERICAN): 36.3 ML/MIN/1.73 M^2
EST. GFR  (NON AFRICAN AMERICAN): 39.2 ML/MIN/1.73 M^2
GLUCOSE SERPL-MCNC: 220 MG/DL (ref 70–110)
GLUCOSE SERPL-MCNC: 267 MG/DL (ref 70–110)
GLUCOSE SERPL-MCNC: 293 MG/DL (ref 70–110)
HCT VFR BLD AUTO: 21.6 % (ref 37–48.5)
HCT VFR BLD AUTO: 28.9 % (ref 37–48.5)
HGB BLD-MCNC: 7.1 G/DL (ref 12–16)
HGB BLD-MCNC: 9.5 G/DL (ref 12–16)
IMM GRANULOCYTES # BLD AUTO: 0.33 K/UL (ref 0–0.04)
IMM GRANULOCYTES # BLD AUTO: 0.75 K/UL (ref 0–0.04)
IMM GRANULOCYTES NFR BLD AUTO: 2 % (ref 0–0.5)
IMM GRANULOCYTES NFR BLD AUTO: 3.6 % (ref 0–0.5)
LYMPHOCYTES # BLD AUTO: 1 K/UL (ref 1–4.8)
LYMPHOCYTES # BLD AUTO: 1.2 K/UL (ref 1–4.8)
LYMPHOCYTES NFR BLD: 5.8 % (ref 18–48)
LYMPHOCYTES NFR BLD: 5.8 % (ref 18–48)
MAGNESIUM SERPL-MCNC: 2 MG/DL (ref 1.6–2.6)
MAGNESIUM SERPL-MCNC: 2.1 MG/DL (ref 1.6–2.6)
MCH RBC QN AUTO: 29.1 PG (ref 27–31)
MCH RBC QN AUTO: 29.8 PG (ref 27–31)
MCHC RBC AUTO-ENTMCNC: 32.9 G/DL (ref 32–36)
MCHC RBC AUTO-ENTMCNC: 32.9 G/DL (ref 32–36)
MCV RBC AUTO: 89 FL (ref 82–98)
MCV RBC AUTO: 91 FL (ref 82–98)
MONOCYTES # BLD AUTO: 1.8 K/UL (ref 0.3–1)
MONOCYTES # BLD AUTO: 1.9 K/UL (ref 0.3–1)
MONOCYTES NFR BLD: 11.6 % (ref 4–15)
MONOCYTES NFR BLD: 8.8 % (ref 4–15)
NEUTROPHILS # BLD AUTO: 13.3 K/UL (ref 1.8–7.7)
NEUTROPHILS # BLD AUTO: 17 K/UL (ref 1.8–7.7)
NEUTROPHILS NFR BLD: 80.4 % (ref 38–73)
NEUTROPHILS NFR BLD: 81.2 % (ref 38–73)
NRBC BLD-RTO: 0 /100 WBC
NRBC BLD-RTO: 0 /100 WBC
PHOSPHATE SERPL-MCNC: 2.1 MG/DL (ref 2.7–4.5)
PHOSPHATE SERPL-MCNC: 2.8 MG/DL (ref 2.7–4.5)
PLATELET # BLD AUTO: 105 K/UL (ref 150–450)
PLATELET # BLD AUTO: 120 K/UL (ref 150–450)
PMV BLD AUTO: 12 FL (ref 9.2–12.9)
PMV BLD AUTO: 12.9 FL (ref 9.2–12.9)
POCT GLUCOSE: 203 MG/DL (ref 70–110)
POCT GLUCOSE: 213 MG/DL (ref 70–110)
POCT GLUCOSE: 280 MG/DL (ref 70–110)
POCT GLUCOSE: 280 MG/DL (ref 70–110)
POCT GLUCOSE: 320 MG/DL (ref 70–110)
POCT GLUCOSE: 330 MG/DL (ref 70–110)
POTASSIUM SERPL-SCNC: 4.1 MMOL/L (ref 3.5–5.1)
PROT SERPL-MCNC: 4.6 G/DL (ref 6–8.4)
PROT SERPL-MCNC: 4.8 G/DL (ref 6–8.4)
RBC # BLD AUTO: 2.38 M/UL (ref 4–5.4)
RBC # BLD AUTO: 3.26 M/UL (ref 4–5.4)
SODIUM SERPL-SCNC: 139 MMOL/L (ref 136–145)
SODIUM SERPL-SCNC: 141 MMOL/L (ref 136–145)
SODIUM SERPL-SCNC: 143 MMOL/L (ref 136–145)
TRANS ERYTHROCYTES VOL PATIENT: NORMAL ML
TRANS ERYTHROCYTES VOL PATIENT: NORMAL ML
WBC # BLD AUTO: 16.59 K/UL (ref 3.9–12.7)
WBC # BLD AUTO: 20.91 K/UL (ref 3.9–12.7)

## 2021-05-24 PROCEDURE — 80053 COMPREHEN METABOLIC PANEL: CPT | Mod: 91 | Performed by: STUDENT IN AN ORGANIZED HEALTH CARE EDUCATION/TRAINING PROGRAM

## 2021-05-24 PROCEDURE — 25000003 PHARM REV CODE 250: Performed by: STUDENT IN AN ORGANIZED HEALTH CARE EDUCATION/TRAINING PROGRAM

## 2021-05-24 PROCEDURE — 99232 SBSQ HOSP IP/OBS MODERATE 35: CPT | Mod: ,,, | Performed by: NURSE PRACTITIONER

## 2021-05-24 PROCEDURE — 25000003 PHARM REV CODE 250: Performed by: INTERNAL MEDICINE

## 2021-05-24 PROCEDURE — 97530 THERAPEUTIC ACTIVITIES: CPT

## 2021-05-24 PROCEDURE — 83735 ASSAY OF MAGNESIUM: CPT | Mod: 91 | Performed by: STUDENT IN AN ORGANIZED HEALTH CARE EDUCATION/TRAINING PROGRAM

## 2021-05-24 PROCEDURE — 84100 ASSAY OF PHOSPHORUS: CPT | Performed by: STUDENT IN AN ORGANIZED HEALTH CARE EDUCATION/TRAINING PROGRAM

## 2021-05-24 PROCEDURE — 99232 PR SUBSEQUENT HOSPITAL CARE,LEVL II: ICD-10-PCS | Mod: ,,, | Performed by: NURSE PRACTITIONER

## 2021-05-24 PROCEDURE — 63600175 PHARM REV CODE 636 W HCPCS: Performed by: NURSE PRACTITIONER

## 2021-05-24 PROCEDURE — 63600175 PHARM REV CODE 636 W HCPCS: Performed by: STUDENT IN AN ORGANIZED HEALTH CARE EDUCATION/TRAINING PROGRAM

## 2021-05-24 PROCEDURE — 99024 POSTOP FOLLOW-UP VISIT: CPT | Mod: POP,,, | Performed by: SURGERY

## 2021-05-24 PROCEDURE — 97110 THERAPEUTIC EXERCISES: CPT | Mod: CQ

## 2021-05-24 PROCEDURE — 20600001 HC STEP DOWN PRIVATE ROOM

## 2021-05-24 PROCEDURE — 85025 COMPLETE CBC W/AUTO DIFF WBC: CPT | Mod: 91 | Performed by: SURGERY

## 2021-05-24 PROCEDURE — 99024 PR POST-OP FOLLOW-UP VISIT: ICD-10-PCS | Mod: POP,,, | Performed by: SURGERY

## 2021-05-24 PROCEDURE — 36430 TRANSFUSION BLD/BLD COMPNT: CPT

## 2021-05-24 PROCEDURE — 99232 SBSQ HOSP IP/OBS MODERATE 35: CPT | Mod: ,,, | Performed by: INTERNAL MEDICINE

## 2021-05-24 PROCEDURE — P9021 RED BLOOD CELLS UNIT: HCPCS | Performed by: STUDENT IN AN ORGANIZED HEALTH CARE EDUCATION/TRAINING PROGRAM

## 2021-05-24 PROCEDURE — 97112 NEUROMUSCULAR REEDUCATION: CPT | Mod: CQ

## 2021-05-24 PROCEDURE — 99232 PR SUBSEQUENT HOSPITAL CARE,LEVL II: ICD-10-PCS | Mod: ,,, | Performed by: INTERNAL MEDICINE

## 2021-05-24 PROCEDURE — 85025 COMPLETE CBC W/AUTO DIFF WBC: CPT | Performed by: STUDENT IN AN ORGANIZED HEALTH CARE EDUCATION/TRAINING PROGRAM

## 2021-05-24 PROCEDURE — 80048 BASIC METABOLIC PNL TOTAL CA: CPT | Performed by: STUDENT IN AN ORGANIZED HEALTH CARE EDUCATION/TRAINING PROGRAM

## 2021-05-24 PROCEDURE — 25000242 PHARM REV CODE 250 ALT 637 W/ HCPCS: Performed by: STUDENT IN AN ORGANIZED HEALTH CARE EDUCATION/TRAINING PROGRAM

## 2021-05-24 RX ORDER — INSULIN ASPART 100 [IU]/ML
0-5 INJECTION, SOLUTION INTRAVENOUS; SUBCUTANEOUS EVERY 4 HOURS PRN
Status: DISCONTINUED | OUTPATIENT
Start: 2021-05-24 | End: 2021-05-25

## 2021-05-24 RX ORDER — INSULIN ASPART 100 [IU]/ML
3 INJECTION, SOLUTION INTRAVENOUS; SUBCUTANEOUS
Status: DISCONTINUED | OUTPATIENT
Start: 2021-05-25 | End: 2021-05-25

## 2021-05-24 RX ORDER — INSULIN ASPART 100 [IU]/ML
3 INJECTION, SOLUTION INTRAVENOUS; SUBCUTANEOUS
Status: DISCONTINUED | OUTPATIENT
Start: 2021-05-24 | End: 2021-05-25

## 2021-05-24 RX ORDER — HYDROCODONE BITARTRATE AND ACETAMINOPHEN 500; 5 MG/1; MG/1
TABLET ORAL
Status: DISCONTINUED | OUTPATIENT
Start: 2021-05-24 | End: 2021-05-28

## 2021-05-24 RX ORDER — GLUCAGON 1 MG
1 KIT INJECTION
Status: DISCONTINUED | OUTPATIENT
Start: 2021-05-24 | End: 2021-05-28

## 2021-05-24 RX ORDER — DEXTROSE MONOHYDRATE 100 MG/ML
INJECTION, SOLUTION INTRAVENOUS CONTINUOUS PRN
Status: DISCONTINUED | OUTPATIENT
Start: 2021-05-24 | End: 2021-05-28

## 2021-05-24 RX ADMIN — HEPARIN SODIUM 5000 UNITS: 5000 INJECTION INTRAVENOUS; SUBCUTANEOUS at 09:05

## 2021-05-24 RX ADMIN — INSULIN ASPART 4 UNITS: 100 INJECTION, SOLUTION INTRAVENOUS; SUBCUTANEOUS at 11:05

## 2021-05-24 RX ADMIN — INSULIN ASPART 3 UNITS: 100 INJECTION, SOLUTION INTRAVENOUS; SUBCUTANEOUS at 11:05

## 2021-05-24 RX ADMIN — ASPIRIN 325 MG ORAL TABLET 325 MG: 325 PILL ORAL at 08:05

## 2021-05-24 RX ADMIN — NYSTATIN: 100000 POWDER TOPICAL at 09:05

## 2021-05-24 RX ADMIN — MIDODRINE HYDROCHLORIDE 2.5 MG: 2.5 TABLET ORAL at 02:05

## 2021-05-24 RX ADMIN — INSULIN ASPART 3 UNITS: 100 INJECTION, SOLUTION INTRAVENOUS; SUBCUTANEOUS at 04:05

## 2021-05-24 RX ADMIN — ROPIVACAINE HYDROCHLORIDE 8 ML/HR: 2 INJECTION, SOLUTION EPIDURAL; INFILTRATION at 03:05

## 2021-05-24 RX ADMIN — INSULIN ASPART 3 UNITS: 100 INJECTION, SOLUTION INTRAVENOUS; SUBCUTANEOUS at 05:05

## 2021-05-24 RX ADMIN — MIDODRINE HYDROCHLORIDE 2.5 MG: 2.5 TABLET ORAL at 09:05

## 2021-05-24 RX ADMIN — SODIUM BICARBONATE 650 MG TABLET 1950 MG: at 09:05

## 2021-05-24 RX ADMIN — CALCIUM CHLORIDE 2 G: 100 INJECTION, SOLUTION INTRAVENOUS at 02:05

## 2021-05-24 RX ADMIN — HEPARIN SODIUM 5000 UNITS: 5000 INJECTION INTRAVENOUS; SUBCUTANEOUS at 02:05

## 2021-05-24 RX ADMIN — ATORVASTATIN CALCIUM 80 MG: 20 TABLET, FILM COATED ORAL at 09:05

## 2021-05-24 RX ADMIN — DOCUSATE SODIUM 50MG AND SENNOSIDES 8.6MG 1 TABLET: 8.6; 5 TABLET, FILM COATED ORAL at 08:05

## 2021-05-24 RX ADMIN — MIDODRINE HYDROCHLORIDE 2.5 MG: 2.5 TABLET ORAL at 08:05

## 2021-05-24 RX ADMIN — Medication 1 PACKET: at 09:05

## 2021-05-24 RX ADMIN — SODIUM BICARBONATE 650 MG TABLET 1950 MG: at 02:05

## 2021-05-24 RX ADMIN — Medication 1 PACKET: at 11:05

## 2021-05-24 RX ADMIN — THIAMINE HYDROCHLORIDE 200 MG: 100 INJECTION, SOLUTION INTRAMUSCULAR; INTRAVENOUS at 10:05

## 2021-05-24 RX ADMIN — SODIUM BICARBONATE 650 MG TABLET 1950 MG: at 08:05

## 2021-05-24 RX ADMIN — INSULIN ASPART 1 UNITS: 100 INJECTION, SOLUTION INTRAVENOUS; SUBCUTANEOUS at 01:05

## 2021-05-24 RX ADMIN — HEPARIN SODIUM 5000 UNITS: 5000 INJECTION INTRAVENOUS; SUBCUTANEOUS at 05:05

## 2021-05-24 RX ADMIN — ACETAMINOPHEN 1000 MG: 500 TABLET, FILM COATED ORAL at 02:05

## 2021-05-24 RX ADMIN — NYSTATIN: 100000 POWDER TOPICAL at 08:05

## 2021-05-24 RX ADMIN — SODIUM PHOSPHATE, MONOBASIC, MONOHYDRATE 39.99 MMOL: 276; 142 INJECTION, SOLUTION INTRAVENOUS at 10:05

## 2021-05-24 RX ADMIN — INSULIN ASPART 4 UNITS: 100 INJECTION, SOLUTION INTRAVENOUS; SUBCUTANEOUS at 08:05

## 2021-05-24 RX ADMIN — INSULIN ASPART 1 UNITS: 100 INJECTION, SOLUTION INTRAVENOUS; SUBCUTANEOUS at 08:05

## 2021-05-24 RX ADMIN — INSULIN ASPART 3 UNITS: 100 INJECTION, SOLUTION INTRAVENOUS; SUBCUTANEOUS at 08:05

## 2021-05-24 RX ADMIN — ACETAMINOPHEN 1000 MG: 500 TABLET, FILM COATED ORAL at 09:05

## 2021-05-25 PROBLEM — R41.82 ALTERED MENTAL STATUS: Status: ACTIVE | Noted: 2021-05-25

## 2021-05-25 LAB
ALBUMIN SERPL BCP-MCNC: 1 G/DL (ref 3.5–5.2)
ALBUMIN SERPL BCP-MCNC: 1 G/DL (ref 3.5–5.2)
ALP SERPL-CCNC: 1153 U/L (ref 55–135)
ALP SERPL-CCNC: 1222 U/L (ref 55–135)
ALT SERPL W/O P-5'-P-CCNC: 46 U/L (ref 10–44)
ALT SERPL W/O P-5'-P-CCNC: 59 U/L (ref 10–44)
ANION GAP SERPL CALC-SCNC: 6 MMOL/L (ref 8–16)
ANION GAP SERPL CALC-SCNC: 7 MMOL/L (ref 8–16)
AST SERPL-CCNC: 126 U/L (ref 10–40)
AST SERPL-CCNC: 159 U/L (ref 10–40)
B-HCG UR QL: NEGATIVE
BASOPHILS # BLD AUTO: 0.06 K/UL (ref 0–0.2)
BASOPHILS NFR BLD: 0.3 % (ref 0–1.9)
BILIRUB SERPL-MCNC: 0.4 MG/DL (ref 0.1–1)
BILIRUB SERPL-MCNC: 0.5 MG/DL (ref 0.1–1)
BUN SERPL-MCNC: 45 MG/DL (ref 6–20)
BUN SERPL-MCNC: 50 MG/DL (ref 6–20)
CALCIUM SERPL-MCNC: 7.1 MG/DL (ref 8.7–10.5)
CALCIUM SERPL-MCNC: 7.4 MG/DL (ref 8.7–10.5)
CHLORIDE SERPL-SCNC: 104 MMOL/L (ref 95–110)
CHLORIDE SERPL-SCNC: 108 MMOL/L (ref 95–110)
CO2 SERPL-SCNC: 28 MMOL/L (ref 23–29)
CO2 SERPL-SCNC: 28 MMOL/L (ref 23–29)
CREAT SERPL-MCNC: 1.3 MG/DL (ref 0.5–1.4)
CREAT SERPL-MCNC: 1.3 MG/DL (ref 0.5–1.4)
DIFFERENTIAL METHOD: ABNORMAL
EOSINOPHIL # BLD AUTO: 0.1 K/UL (ref 0–0.5)
EOSINOPHIL NFR BLD: 0.4 % (ref 0–8)
ERYTHROCYTE [DISTWIDTH] IN BLOOD BY AUTOMATED COUNT: 17 % (ref 11.5–14.5)
EST. GFR  (AFRICAN AMERICAN): 53.7 ML/MIN/1.73 M^2
EST. GFR  (AFRICAN AMERICAN): 53.7 ML/MIN/1.73 M^2
EST. GFR  (NON AFRICAN AMERICAN): 46.6 ML/MIN/1.73 M^2
EST. GFR  (NON AFRICAN AMERICAN): 46.6 ML/MIN/1.73 M^2
GLUCOSE SERPL-MCNC: 176 MG/DL (ref 70–110)
GLUCOSE SERPL-MCNC: 268 MG/DL (ref 70–110)
HCT VFR BLD AUTO: 28 % (ref 37–48.5)
HGB BLD-MCNC: 9 G/DL (ref 12–16)
IMM GRANULOCYTES # BLD AUTO: 0.68 K/UL (ref 0–0.04)
IMM GRANULOCYTES NFR BLD AUTO: 3.4 % (ref 0–0.5)
LYMPHOCYTES # BLD AUTO: 1.3 K/UL (ref 1–4.8)
LYMPHOCYTES NFR BLD: 6.5 % (ref 18–48)
MAGNESIUM SERPL-MCNC: 1.9 MG/DL (ref 1.6–2.6)
MAGNESIUM SERPL-MCNC: 1.9 MG/DL (ref 1.6–2.6)
MCH RBC QN AUTO: 28.3 PG (ref 27–31)
MCHC RBC AUTO-ENTMCNC: 32.1 G/DL (ref 32–36)
MCV RBC AUTO: 88 FL (ref 82–98)
MONOCYTES # BLD AUTO: 2 K/UL (ref 0.3–1)
MONOCYTES NFR BLD: 9.7 % (ref 4–15)
NEUTROPHILS # BLD AUTO: 16.1 K/UL (ref 1.8–7.7)
NEUTROPHILS NFR BLD: 79.7 % (ref 38–73)
NRBC BLD-RTO: 0 /100 WBC
PHOSPHATE SERPL-MCNC: 3 MG/DL (ref 2.7–4.5)
PHOSPHATE SERPL-MCNC: 3.3 MG/DL (ref 2.7–4.5)
PLATELET # BLD AUTO: 128 K/UL (ref 150–450)
PMV BLD AUTO: 12.8 FL (ref 9.2–12.9)
POCT GLUCOSE: 174 MG/DL (ref 70–110)
POCT GLUCOSE: 244 MG/DL (ref 70–110)
POCT GLUCOSE: 246 MG/DL (ref 70–110)
POCT GLUCOSE: 251 MG/DL (ref 70–110)
POCT GLUCOSE: 258 MG/DL (ref 70–110)
POTASSIUM SERPL-SCNC: 4.1 MMOL/L (ref 3.5–5.1)
POTASSIUM SERPL-SCNC: 4.2 MMOL/L (ref 3.5–5.1)
PROT SERPL-MCNC: 4.8 G/DL (ref 6–8.4)
PROT SERPL-MCNC: 5 G/DL (ref 6–8.4)
RBC # BLD AUTO: 3.18 M/UL (ref 4–5.4)
SODIUM SERPL-SCNC: 138 MMOL/L (ref 136–145)
SODIUM SERPL-SCNC: 143 MMOL/L (ref 136–145)
WBC # BLD AUTO: 20.23 K/UL (ref 3.9–12.7)

## 2021-05-25 PROCEDURE — 99232 SBSQ HOSP IP/OBS MODERATE 35: CPT | Mod: ,,, | Performed by: INTERNAL MEDICINE

## 2021-05-25 PROCEDURE — 84100 ASSAY OF PHOSPHORUS: CPT | Performed by: STUDENT IN AN ORGANIZED HEALTH CARE EDUCATION/TRAINING PROGRAM

## 2021-05-25 PROCEDURE — 99232 PR SUBSEQUENT HOSPITAL CARE,LEVL II: ICD-10-PCS | Mod: ,,, | Performed by: NURSE PRACTITIONER

## 2021-05-25 PROCEDURE — 81025 URINE PREGNANCY TEST: CPT | Performed by: SURGERY

## 2021-05-25 PROCEDURE — 99232 SBSQ HOSP IP/OBS MODERATE 35: CPT | Mod: ,,, | Performed by: NURSE PRACTITIONER

## 2021-05-25 PROCEDURE — 63600175 PHARM REV CODE 636 W HCPCS: Performed by: NURSE PRACTITIONER

## 2021-05-25 PROCEDURE — 93926 LOWER EXTREMITY STUDY: CPT | Performed by: SURGERY

## 2021-05-25 PROCEDURE — 85025 COMPLETE CBC W/AUTO DIFF WBC: CPT | Performed by: STUDENT IN AN ORGANIZED HEALTH CARE EDUCATION/TRAINING PROGRAM

## 2021-05-25 PROCEDURE — 99024 PR POST-OP FOLLOW-UP VISIT: ICD-10-PCS | Mod: POP,,, | Performed by: SURGERY

## 2021-05-25 PROCEDURE — 80053 COMPREHEN METABOLIC PANEL: CPT | Mod: 91 | Performed by: STUDENT IN AN ORGANIZED HEALTH CARE EDUCATION/TRAINING PROGRAM

## 2021-05-25 PROCEDURE — 99024 POSTOP FOLLOW-UP VISIT: CPT | Mod: POP,,, | Performed by: SURGERY

## 2021-05-25 PROCEDURE — 25000003 PHARM REV CODE 250: Performed by: STUDENT IN AN ORGANIZED HEALTH CARE EDUCATION/TRAINING PROGRAM

## 2021-05-25 PROCEDURE — 25000242 PHARM REV CODE 250 ALT 637 W/ HCPCS: Performed by: STUDENT IN AN ORGANIZED HEALTH CARE EDUCATION/TRAINING PROGRAM

## 2021-05-25 PROCEDURE — 63600175 PHARM REV CODE 636 W HCPCS: Performed by: STUDENT IN AN ORGANIZED HEALTH CARE EDUCATION/TRAINING PROGRAM

## 2021-05-25 PROCEDURE — 83735 ASSAY OF MAGNESIUM: CPT | Mod: 91 | Performed by: STUDENT IN AN ORGANIZED HEALTH CARE EDUCATION/TRAINING PROGRAM

## 2021-05-25 PROCEDURE — 99232 PR SUBSEQUENT HOSPITAL CARE,LEVL II: ICD-10-PCS | Mod: ,,, | Performed by: INTERNAL MEDICINE

## 2021-05-25 PROCEDURE — 20600001 HC STEP DOWN PRIVATE ROOM

## 2021-05-25 PROCEDURE — 99223 1ST HOSP IP/OBS HIGH 75: CPT | Mod: ,,, | Performed by: PSYCHIATRY & NEUROLOGY

## 2021-05-25 PROCEDURE — 63600175 PHARM REV CODE 636 W HCPCS: Performed by: SURGERY

## 2021-05-25 PROCEDURE — 99223 PR INITIAL HOSPITAL CARE,LEVL III: ICD-10-PCS | Mod: ,,, | Performed by: PSYCHIATRY & NEUROLOGY

## 2021-05-25 PROCEDURE — 25000003 PHARM REV CODE 250: Performed by: INTERNAL MEDICINE

## 2021-05-25 RX ORDER — INSULIN ASPART 100 [IU]/ML
4 INJECTION, SOLUTION INTRAVENOUS; SUBCUTANEOUS
Status: DISCONTINUED | OUTPATIENT
Start: 2021-05-25 | End: 2021-05-26

## 2021-05-25 RX ORDER — HYDROMORPHONE HYDROCHLORIDE 1 MG/ML
0.5 INJECTION, SOLUTION INTRAMUSCULAR; INTRAVENOUS; SUBCUTANEOUS EVERY 4 HOURS PRN
Status: DISCONTINUED | OUTPATIENT
Start: 2021-05-25 | End: 2021-06-01

## 2021-05-25 RX ORDER — HYDROMORPHONE HYDROCHLORIDE 1 MG/ML
0.5 INJECTION, SOLUTION INTRAMUSCULAR; INTRAVENOUS; SUBCUTANEOUS ONCE
Status: COMPLETED | OUTPATIENT
Start: 2021-05-25 | End: 2021-05-25

## 2021-05-25 RX ORDER — INSULIN ASPART 100 [IU]/ML
4 INJECTION, SOLUTION INTRAVENOUS; SUBCUTANEOUS
Status: DISCONTINUED | OUTPATIENT
Start: 2021-05-26 | End: 2021-05-26

## 2021-05-25 RX ORDER — LEVOFLOXACIN 5 MG/ML
750 INJECTION, SOLUTION INTRAVENOUS
Status: COMPLETED | OUTPATIENT
Start: 2021-05-25 | End: 2021-05-25

## 2021-05-25 RX ORDER — INSULIN ASPART 100 [IU]/ML
1-10 INJECTION, SOLUTION INTRAVENOUS; SUBCUTANEOUS EVERY 4 HOURS PRN
Status: DISCONTINUED | OUTPATIENT
Start: 2021-05-25 | End: 2021-05-26

## 2021-05-25 RX ORDER — INSULIN ASPART 100 [IU]/ML
4 INJECTION, SOLUTION INTRAVENOUS; SUBCUTANEOUS
Status: DISCONTINUED | OUTPATIENT
Start: 2021-05-26 | End: 2021-05-25

## 2021-05-25 RX ORDER — HYDROMORPHONE HYDROCHLORIDE 1 MG/ML
0.5 INJECTION, SOLUTION INTRAMUSCULAR; INTRAVENOUS; SUBCUTANEOUS EVERY 6 HOURS PRN
Status: DISCONTINUED | OUTPATIENT
Start: 2021-05-25 | End: 2021-05-25

## 2021-05-25 RX ADMIN — Medication 1 PACKET: at 08:05

## 2021-05-25 RX ADMIN — MIDODRINE HYDROCHLORIDE 2.5 MG: 2.5 TABLET ORAL at 03:05

## 2021-05-25 RX ADMIN — SODIUM BICARBONATE 650 MG TABLET 1950 MG: at 08:05

## 2021-05-25 RX ADMIN — LEVOFLOXACIN 750 MG: 750 INJECTION, SOLUTION INTRAVENOUS at 03:05

## 2021-05-25 RX ADMIN — SODIUM BICARBONATE 650 MG TABLET 1950 MG: at 03:05

## 2021-05-25 RX ADMIN — INSULIN ASPART 2 UNITS: 100 INJECTION, SOLUTION INTRAVENOUS; SUBCUTANEOUS at 07:05

## 2021-05-25 RX ADMIN — INSULIN ASPART 3 UNITS: 100 INJECTION, SOLUTION INTRAVENOUS; SUBCUTANEOUS at 04:05

## 2021-05-25 RX ADMIN — ATORVASTATIN CALCIUM 80 MG: 20 TABLET, FILM COATED ORAL at 08:05

## 2021-05-25 RX ADMIN — ASPIRIN 325 MG ORAL TABLET 325 MG: 325 PILL ORAL at 08:05

## 2021-05-25 RX ADMIN — INSULIN ASPART 2 UNITS: 100 INJECTION, SOLUTION INTRAVENOUS; SUBCUTANEOUS at 08:05

## 2021-05-25 RX ADMIN — HEPARIN SODIUM 5000 UNITS: 5000 INJECTION INTRAVENOUS; SUBCUTANEOUS at 01:05

## 2021-05-25 RX ADMIN — NYSTATIN: 100000 POWDER TOPICAL at 09:05

## 2021-05-25 RX ADMIN — THIAMINE HYDROCHLORIDE 200 MG: 100 INJECTION, SOLUTION INTRAMUSCULAR; INTRAVENOUS at 09:05

## 2021-05-25 RX ADMIN — HEPARIN SODIUM 5000 UNITS: 5000 INJECTION INTRAVENOUS; SUBCUTANEOUS at 06:05

## 2021-05-25 RX ADMIN — HEPARIN SODIUM 5000 UNITS: 5000 INJECTION INTRAVENOUS; SUBCUTANEOUS at 10:05

## 2021-05-25 RX ADMIN — INSULIN ASPART 3 UNITS: 100 INJECTION, SOLUTION INTRAVENOUS; SUBCUTANEOUS at 07:05

## 2021-05-25 RX ADMIN — INSULIN ASPART 4 UNITS: 100 INJECTION, SOLUTION INTRAVENOUS; SUBCUTANEOUS at 05:05

## 2021-05-25 RX ADMIN — NYSTATIN: 100000 POWDER TOPICAL at 10:05

## 2021-05-25 RX ADMIN — ACETAMINOPHEN 1000 MG: 500 TABLET, FILM COATED ORAL at 06:05

## 2021-05-25 RX ADMIN — HYDROMORPHONE HYDROCHLORIDE 0.5 MG: 1 INJECTION, SOLUTION INTRAMUSCULAR; INTRAVENOUS; SUBCUTANEOUS at 11:05

## 2021-05-25 RX ADMIN — INSULIN ASPART 4 UNITS: 100 INJECTION, SOLUTION INTRAVENOUS; SUBCUTANEOUS at 08:05

## 2021-05-25 RX ADMIN — HYDROMORPHONE HYDROCHLORIDE 0.5 MG: 1 INJECTION, SOLUTION INTRAMUSCULAR; INTRAVENOUS; SUBCUTANEOUS at 08:05

## 2021-05-25 RX ADMIN — INSULIN ASPART 3 UNITS: 100 INJECTION, SOLUTION INTRAVENOUS; SUBCUTANEOUS at 01:05

## 2021-05-25 RX ADMIN — MIDODRINE HYDROCHLORIDE 2.5 MG: 2.5 TABLET ORAL at 08:05

## 2021-05-25 RX ADMIN — HYDROMORPHONE HYDROCHLORIDE 0.5 MG: 1 INJECTION, SOLUTION INTRAMUSCULAR; INTRAVENOUS; SUBCUTANEOUS at 05:05

## 2021-05-26 LAB
ABO + RH BLD: NORMAL
ALBUMIN SERPL BCP-MCNC: 1 G/DL (ref 3.5–5.2)
ALP SERPL-CCNC: 1087 U/L (ref 55–135)
ALT SERPL W/O P-5'-P-CCNC: 55 U/L (ref 10–44)
AMYLASE SERPL-CCNC: 98 U/L (ref 20–110)
ANION GAP SERPL CALC-SCNC: 7 MMOL/L (ref 8–16)
ANISOCYTOSIS BLD QL SMEAR: SLIGHT
AST SERPL-CCNC: 123 U/L (ref 10–40)
BASOPHILS # BLD AUTO: 0.02 K/UL (ref 0–0.2)
BASOPHILS # BLD AUTO: 0.03 K/UL (ref 0–0.2)
BASOPHILS # BLD AUTO: 0.04 K/UL (ref 0–0.2)
BASOPHILS # BLD AUTO: 0.04 K/UL (ref 0–0.2)
BASOPHILS NFR BLD: 0.1 % (ref 0–1.9)
BASOPHILS NFR BLD: 0.2 % (ref 0–1.9)
BASOPHILS NFR BLD: 0.2 % (ref 0–1.9)
BASOPHILS NFR BLD: 0.3 % (ref 0–1.9)
BILIRUB SERPL-MCNC: 0.3 MG/DL (ref 0.1–1)
BLD GP AB SCN CELLS X3 SERPL QL: NORMAL
BLD PROD TYP BPU: NORMAL
BLD PROD TYP BPU: NORMAL
BLOOD UNIT EXPIRATION DATE: NORMAL
BLOOD UNIT EXPIRATION DATE: NORMAL
BLOOD UNIT TYPE CODE: 5100
BLOOD UNIT TYPE CODE: 5100
BLOOD UNIT TYPE: NORMAL
BLOOD UNIT TYPE: NORMAL
BUN SERPL-MCNC: 46 MG/DL (ref 6–20)
CALCIUM SERPL-MCNC: 7.1 MG/DL (ref 8.7–10.5)
CHLORIDE SERPL-SCNC: 103 MMOL/L (ref 95–110)
CO2 SERPL-SCNC: 27 MMOL/L (ref 23–29)
CODING SYSTEM: NORMAL
CODING SYSTEM: NORMAL
CREAT SERPL-MCNC: 1.2 MG/DL (ref 0.5–1.4)
CRP SERPL-MCNC: 149.5 MG/L (ref 0–8.2)
DIFFERENTIAL METHOD: ABNORMAL
DISPENSE STATUS: NORMAL
DISPENSE STATUS: NORMAL
DOHLE BOD BLD QL SMEAR: ABNORMAL
EOSINOPHIL # BLD AUTO: 0.1 K/UL (ref 0–0.5)
EOSINOPHIL # BLD AUTO: 0.1 K/UL (ref 0–0.5)
EOSINOPHIL # BLD AUTO: 0.2 K/UL (ref 0–0.5)
EOSINOPHIL # BLD AUTO: 0.2 K/UL (ref 0–0.5)
EOSINOPHIL NFR BLD: 0.5 % (ref 0–8)
EOSINOPHIL NFR BLD: 0.5 % (ref 0–8)
EOSINOPHIL NFR BLD: 1 % (ref 0–8)
EOSINOPHIL NFR BLD: 1.2 % (ref 0–8)
ERYTHROCYTE [DISTWIDTH] IN BLOOD BY AUTOMATED COUNT: 16 % (ref 11.5–14.5)
ERYTHROCYTE [DISTWIDTH] IN BLOOD BY AUTOMATED COUNT: 16.5 % (ref 11.5–14.5)
ERYTHROCYTE [DISTWIDTH] IN BLOOD BY AUTOMATED COUNT: 17.3 % (ref 11.5–14.5)
ERYTHROCYTE [DISTWIDTH] IN BLOOD BY AUTOMATED COUNT: 17.3 % (ref 11.5–14.5)
EST. GFR  (AFRICAN AMERICAN): 59.2 ML/MIN/1.73 M^2
EST. GFR  (NON AFRICAN AMERICAN): 51.4 ML/MIN/1.73 M^2
GLUCOSE SERPL-MCNC: 202 MG/DL (ref 70–110)
HCT VFR BLD AUTO: 20.3 % (ref 37–48.5)
HCT VFR BLD AUTO: 21.8 % (ref 37–48.5)
HCT VFR BLD AUTO: 25.3 % (ref 37–48.5)
HCT VFR BLD AUTO: 26.8 % (ref 37–48.5)
HGB BLD-MCNC: 6.6 G/DL (ref 12–16)
HGB BLD-MCNC: 7.1 G/DL (ref 12–16)
HGB BLD-MCNC: 8.3 G/DL (ref 12–16)
HGB BLD-MCNC: 8.8 G/DL (ref 12–16)
HYPOCHROMIA BLD QL SMEAR: ABNORMAL
IMM GRANULOCYTES # BLD AUTO: 0.43 K/UL (ref 0–0.04)
IMM GRANULOCYTES # BLD AUTO: 0.43 K/UL (ref 0–0.04)
IMM GRANULOCYTES # BLD AUTO: 0.51 K/UL (ref 0–0.04)
IMM GRANULOCYTES # BLD AUTO: 0.54 K/UL (ref 0–0.04)
IMM GRANULOCYTES NFR BLD AUTO: 2.2 % (ref 0–0.5)
IMM GRANULOCYTES NFR BLD AUTO: 2.7 % (ref 0–0.5)
IMM GRANULOCYTES NFR BLD AUTO: 2.7 % (ref 0–0.5)
IMM GRANULOCYTES NFR BLD AUTO: 2.8 % (ref 0–0.5)
LIPASE SERPL-CCNC: 129 U/L (ref 4–60)
LYMPHOCYTES # BLD AUTO: 1 K/UL (ref 1–4.8)
LYMPHOCYTES # BLD AUTO: 1.1 K/UL (ref 1–4.8)
LYMPHOCYTES # BLD AUTO: 1.2 K/UL (ref 1–4.8)
LYMPHOCYTES # BLD AUTO: 1.3 K/UL (ref 1–4.8)
LYMPHOCYTES NFR BLD: 5.8 % (ref 18–48)
LYMPHOCYTES NFR BLD: 5.9 % (ref 18–48)
LYMPHOCYTES NFR BLD: 6.2 % (ref 18–48)
LYMPHOCYTES NFR BLD: 6.3 % (ref 18–48)
MAGNESIUM SERPL-MCNC: 1.9 MG/DL (ref 1.6–2.6)
MCH RBC QN AUTO: 28.5 PG (ref 27–31)
MCH RBC QN AUTO: 28.7 PG (ref 27–31)
MCH RBC QN AUTO: 28.9 PG (ref 27–31)
MCH RBC QN AUTO: 29 PG (ref 27–31)
MCHC RBC AUTO-ENTMCNC: 32.5 G/DL (ref 32–36)
MCHC RBC AUTO-ENTMCNC: 32.6 G/DL (ref 32–36)
MCHC RBC AUTO-ENTMCNC: 32.8 G/DL (ref 32–36)
MCHC RBC AUTO-ENTMCNC: 32.8 G/DL (ref 32–36)
MCV RBC AUTO: 87 FL (ref 82–98)
MCV RBC AUTO: 88 FL (ref 82–98)
MCV RBC AUTO: 89 FL (ref 82–98)
MCV RBC AUTO: 89 FL (ref 82–98)
MONOCYTES # BLD AUTO: 1.5 K/UL (ref 0.3–1)
MONOCYTES # BLD AUTO: 1.7 K/UL (ref 0.3–1)
MONOCYTES # BLD AUTO: 2 K/UL (ref 0.3–1)
MONOCYTES # BLD AUTO: 2.2 K/UL (ref 0.3–1)
MONOCYTES NFR BLD: 10.6 % (ref 4–15)
MONOCYTES NFR BLD: 11.2 % (ref 4–15)
MONOCYTES NFR BLD: 7.8 % (ref 4–15)
MONOCYTES NFR BLD: 9.8 % (ref 4–15)
NEUTROPHILS # BLD AUTO: 12.6 K/UL (ref 1.8–7.7)
NEUTROPHILS # BLD AUTO: 15.5 K/UL (ref 1.8–7.7)
NEUTROPHILS # BLD AUTO: 15.8 K/UL (ref 1.8–7.7)
NEUTROPHILS # BLD AUTO: 16 K/UL (ref 1.8–7.7)
NEUTROPHILS NFR BLD: 79.4 % (ref 38–73)
NEUTROPHILS NFR BLD: 79.7 % (ref 38–73)
NEUTROPHILS NFR BLD: 80.6 % (ref 38–73)
NEUTROPHILS NFR BLD: 82.3 % (ref 38–73)
NRBC BLD-RTO: 0 /100 WBC
NUM UNITS TRANS PACKED RBC: NORMAL
NUM UNITS TRANS PACKED RBC: NORMAL
OVALOCYTES BLD QL SMEAR: ABNORMAL
PHOSPHATE SERPL-MCNC: 2.7 MG/DL (ref 2.7–4.5)
PLATELET # BLD AUTO: 121 K/UL (ref 150–450)
PLATELET # BLD AUTO: 147 K/UL (ref 150–450)
PLATELET # BLD AUTO: 155 K/UL (ref 150–450)
PLATELET # BLD AUTO: 159 K/UL (ref 150–450)
PLATELET BLD QL SMEAR: ABNORMAL
PMV BLD AUTO: 12.5 FL (ref 9.2–12.9)
PMV BLD AUTO: 12.7 FL (ref 9.2–12.9)
PMV BLD AUTO: 13 FL (ref 9.2–12.9)
PMV BLD AUTO: 13.1 FL (ref 9.2–12.9)
POCT GLUCOSE: 216 MG/DL (ref 70–110)
POCT GLUCOSE: 251 MG/DL (ref 70–110)
POCT GLUCOSE: 268 MG/DL (ref 70–110)
POCT GLUCOSE: 299 MG/DL (ref 70–110)
POIKILOCYTOSIS BLD QL SMEAR: SLIGHT
POLYCHROMASIA BLD QL SMEAR: ABNORMAL
POTASSIUM SERPL-SCNC: 4.4 MMOL/L (ref 3.5–5.1)
PREALB SERPL-MCNC: 5 MG/DL (ref 20–43)
PROCALCITONIN SERPL IA-MCNC: 1.25 NG/ML
PROT SERPL-MCNC: 5.1 G/DL (ref 6–8.4)
RBC # BLD AUTO: 2.28 M/UL (ref 4–5.4)
RBC # BLD AUTO: 2.45 M/UL (ref 4–5.4)
RBC # BLD AUTO: 2.89 M/UL (ref 4–5.4)
RBC # BLD AUTO: 3.09 M/UL (ref 4–5.4)
SCHISTOCYTES BLD QL SMEAR: ABNORMAL
SODIUM SERPL-SCNC: 137 MMOL/L (ref 136–145)
TOXIC GRANULES BLD QL SMEAR: PRESENT
WBC # BLD AUTO: 15.74 K/UL (ref 3.9–12.7)
WBC # BLD AUTO: 19.11 K/UL (ref 3.9–12.7)
WBC # BLD AUTO: 19.55 K/UL (ref 3.9–12.7)
WBC # BLD AUTO: 19.81 K/UL (ref 3.9–12.7)

## 2021-05-26 PROCEDURE — 86140 C-REACTIVE PROTEIN: CPT | Performed by: STUDENT IN AN ORGANIZED HEALTH CARE EDUCATION/TRAINING PROGRAM

## 2021-05-26 PROCEDURE — 36430 TRANSFUSION BLD/BLD COMPNT: CPT

## 2021-05-26 PROCEDURE — 99232 SBSQ HOSP IP/OBS MODERATE 35: CPT | Mod: ,,, | Performed by: NURSE PRACTITIONER

## 2021-05-26 PROCEDURE — 86920 COMPATIBILITY TEST SPIN: CPT | Performed by: STUDENT IN AN ORGANIZED HEALTH CARE EDUCATION/TRAINING PROGRAM

## 2021-05-26 PROCEDURE — 25000003 PHARM REV CODE 250: Performed by: INTERNAL MEDICINE

## 2021-05-26 PROCEDURE — 84134 ASSAY OF PREALBUMIN: CPT | Performed by: STUDENT IN AN ORGANIZED HEALTH CARE EDUCATION/TRAINING PROGRAM

## 2021-05-26 PROCEDURE — 95718 PR EEG, W/VIDEO, CONT RECORD, I&R, 2-12 HRS: ICD-10-PCS | Mod: ,,, | Performed by: PSYCHIATRY & NEUROLOGY

## 2021-05-26 PROCEDURE — 85025 COMPLETE CBC W/AUTO DIFF WBC: CPT | Mod: 91 | Performed by: STUDENT IN AN ORGANIZED HEALTH CARE EDUCATION/TRAINING PROGRAM

## 2021-05-26 PROCEDURE — 99024 POSTOP FOLLOW-UP VISIT: CPT | Mod: POP,,, | Performed by: SURGERY

## 2021-05-26 PROCEDURE — P9016 RBC LEUKOCYTES REDUCED: HCPCS | Performed by: STUDENT IN AN ORGANIZED HEALTH CARE EDUCATION/TRAINING PROGRAM

## 2021-05-26 PROCEDURE — 25000003 PHARM REV CODE 250: Performed by: STUDENT IN AN ORGANIZED HEALTH CARE EDUCATION/TRAINING PROGRAM

## 2021-05-26 PROCEDURE — 63600175 PHARM REV CODE 636 W HCPCS: Performed by: STUDENT IN AN ORGANIZED HEALTH CARE EDUCATION/TRAINING PROGRAM

## 2021-05-26 PROCEDURE — 84100 ASSAY OF PHOSPHORUS: CPT | Performed by: STUDENT IN AN ORGANIZED HEALTH CARE EDUCATION/TRAINING PROGRAM

## 2021-05-26 PROCEDURE — 80053 COMPREHEN METABOLIC PANEL: CPT | Performed by: STUDENT IN AN ORGANIZED HEALTH CARE EDUCATION/TRAINING PROGRAM

## 2021-05-26 PROCEDURE — 97110 THERAPEUTIC EXERCISES: CPT

## 2021-05-26 PROCEDURE — 95700 EEG CONT REC W/VID EEG TECH: CPT

## 2021-05-26 PROCEDURE — 97535 SELF CARE MNGMENT TRAINING: CPT

## 2021-05-26 PROCEDURE — 92526 ORAL FUNCTION THERAPY: CPT

## 2021-05-26 PROCEDURE — 95718 EEG PHYS/QHP 2-12 HR W/VEEG: CPT | Mod: ,,, | Performed by: PSYCHIATRY & NEUROLOGY

## 2021-05-26 PROCEDURE — 99232 PR SUBSEQUENT HOSPITAL CARE,LEVL II: ICD-10-PCS | Mod: ,,, | Performed by: NURSE PRACTITIONER

## 2021-05-26 PROCEDURE — 95711 VEEG 2-12 HR UNMONITORED: CPT

## 2021-05-26 PROCEDURE — 84145 PROCALCITONIN (PCT): CPT | Performed by: STUDENT IN AN ORGANIZED HEALTH CARE EDUCATION/TRAINING PROGRAM

## 2021-05-26 PROCEDURE — 85025 COMPLETE CBC W/AUTO DIFF WBC: CPT | Performed by: STUDENT IN AN ORGANIZED HEALTH CARE EDUCATION/TRAINING PROGRAM

## 2021-05-26 PROCEDURE — 85025 COMPLETE CBC W/AUTO DIFF WBC: CPT | Mod: 91 | Performed by: SURGERY

## 2021-05-26 PROCEDURE — 83735 ASSAY OF MAGNESIUM: CPT | Performed by: STUDENT IN AN ORGANIZED HEALTH CARE EDUCATION/TRAINING PROGRAM

## 2021-05-26 PROCEDURE — 20600001 HC STEP DOWN PRIVATE ROOM

## 2021-05-26 PROCEDURE — 83690 ASSAY OF LIPASE: CPT | Performed by: STUDENT IN AN ORGANIZED HEALTH CARE EDUCATION/TRAINING PROGRAM

## 2021-05-26 PROCEDURE — 82150 ASSAY OF AMYLASE: CPT | Performed by: STUDENT IN AN ORGANIZED HEALTH CARE EDUCATION/TRAINING PROGRAM

## 2021-05-26 PROCEDURE — 25000242 PHARM REV CODE 250 ALT 637 W/ HCPCS: Performed by: STUDENT IN AN ORGANIZED HEALTH CARE EDUCATION/TRAINING PROGRAM

## 2021-05-26 PROCEDURE — 86900 BLOOD TYPING SEROLOGIC ABO: CPT | Performed by: SURGERY

## 2021-05-26 PROCEDURE — 99222 PR INITIAL HOSPITAL CARE,LEVL II: ICD-10-PCS | Mod: GC,,, | Performed by: PSYCHIATRY & NEUROLOGY

## 2021-05-26 PROCEDURE — 99222 1ST HOSP IP/OBS MODERATE 55: CPT | Mod: GC,,, | Performed by: PSYCHIATRY & NEUROLOGY

## 2021-05-26 PROCEDURE — 63600175 PHARM REV CODE 636 W HCPCS: Performed by: NURSE PRACTITIONER

## 2021-05-26 PROCEDURE — 99024 PR POST-OP FOLLOW-UP VISIT: ICD-10-PCS | Mod: POP,,, | Performed by: SURGERY

## 2021-05-26 PROCEDURE — 97530 THERAPEUTIC ACTIVITIES: CPT

## 2021-05-26 RX ORDER — INSULIN ASPART 100 [IU]/ML
6 INJECTION, SOLUTION INTRAVENOUS; SUBCUTANEOUS
Status: DISCONTINUED | OUTPATIENT
Start: 2021-05-26 | End: 2021-05-27

## 2021-05-26 RX ORDER — INSULIN ASPART 100 [IU]/ML
6 INJECTION, SOLUTION INTRAVENOUS; SUBCUTANEOUS
Status: DISCONTINUED | OUTPATIENT
Start: 2021-05-27 | End: 2021-05-27

## 2021-05-26 RX ORDER — TALC
6 POWDER (GRAM) TOPICAL NIGHTLY PRN
Status: DISCONTINUED | OUTPATIENT
Start: 2021-05-26 | End: 2021-06-08 | Stop reason: HOSPADM

## 2021-05-26 RX ORDER — HYDROCODONE BITARTRATE AND ACETAMINOPHEN 500; 5 MG/1; MG/1
TABLET ORAL
Status: DISCONTINUED | OUTPATIENT
Start: 2021-05-26 | End: 2021-05-28

## 2021-05-26 RX ORDER — INSULIN ASPART 100 [IU]/ML
0-5 INJECTION, SOLUTION INTRAVENOUS; SUBCUTANEOUS EVERY 4 HOURS PRN
Status: DISCONTINUED | OUTPATIENT
Start: 2021-05-26 | End: 2021-05-28

## 2021-05-26 RX ADMIN — Medication 1 PACKET: at 10:05

## 2021-05-26 RX ADMIN — MIDODRINE HYDROCHLORIDE 2.5 MG: 2.5 TABLET ORAL at 10:05

## 2021-05-26 RX ADMIN — INSULIN ASPART 4 UNITS: 100 INJECTION, SOLUTION INTRAVENOUS; SUBCUTANEOUS at 04:05

## 2021-05-26 RX ADMIN — SODIUM BICARBONATE 650 MG TABLET 1950 MG: at 10:05

## 2021-05-26 RX ADMIN — NYSTATIN: 100000 POWDER TOPICAL at 09:05

## 2021-05-26 RX ADMIN — INSULIN ASPART 4 UNITS: 100 INJECTION, SOLUTION INTRAVENOUS; SUBCUTANEOUS at 12:05

## 2021-05-26 RX ADMIN — Medication 1 PACKET: at 09:05

## 2021-05-26 RX ADMIN — HYDROMORPHONE HYDROCHLORIDE 0.5 MG: 1 INJECTION, SOLUTION INTRAMUSCULAR; INTRAVENOUS; SUBCUTANEOUS at 07:05

## 2021-05-26 RX ADMIN — MELATONIN TAB 3 MG 6 MG: 3 TAB at 10:05

## 2021-05-26 RX ADMIN — SODIUM BICARBONATE 650 MG TABLET 1950 MG: at 03:05

## 2021-05-26 RX ADMIN — ONDANSETRON 4 MG: 2 INJECTION INTRAMUSCULAR; INTRAVENOUS at 10:05

## 2021-05-26 RX ADMIN — THIAMINE HYDROCHLORIDE 200 MG: 100 INJECTION, SOLUTION INTRAMUSCULAR; INTRAVENOUS at 10:05

## 2021-05-26 RX ADMIN — ATORVASTATIN CALCIUM 80 MG: 20 TABLET, FILM COATED ORAL at 10:05

## 2021-05-26 RX ADMIN — HEPARIN SODIUM 5000 UNITS: 5000 INJECTION INTRAVENOUS; SUBCUTANEOUS at 05:05

## 2021-05-26 RX ADMIN — INSULIN ASPART 6 UNITS: 100 INJECTION, SOLUTION INTRAVENOUS; SUBCUTANEOUS at 04:05

## 2021-05-26 RX ADMIN — MELATONIN TAB 3 MG 6 MG: 3 TAB at 12:05

## 2021-05-26 RX ADMIN — INSULIN ASPART 3 UNITS: 100 INJECTION, SOLUTION INTRAVENOUS; SUBCUTANEOUS at 05:05

## 2021-05-26 RX ADMIN — INSULIN ASPART 3 UNITS: 100 INJECTION, SOLUTION INTRAVENOUS; SUBCUTANEOUS at 12:05

## 2021-05-26 RX ADMIN — MIDODRINE HYDROCHLORIDE 2.5 MG: 2.5 TABLET ORAL at 03:05

## 2021-05-26 RX ADMIN — HYDROMORPHONE HYDROCHLORIDE 0.5 MG: 1 INJECTION, SOLUTION INTRAMUSCULAR; INTRAVENOUS; SUBCUTANEOUS at 04:05

## 2021-05-26 RX ADMIN — INSULIN ASPART 4 UNITS: 100 INJECTION, SOLUTION INTRAVENOUS; SUBCUTANEOUS at 08:05

## 2021-05-27 ENCOUNTER — ANESTHESIA (OUTPATIENT)
Dept: SURGERY | Facility: HOSPITAL | Age: 56
DRG: 239 | End: 2021-05-27
Payer: MEDICARE

## 2021-05-27 ENCOUNTER — ANESTHESIA EVENT (OUTPATIENT)
Dept: SURGERY | Facility: HOSPITAL | Age: 56
DRG: 239 | End: 2021-05-27
Payer: MEDICARE

## 2021-05-27 LAB
ALBUMIN SERPL BCP-MCNC: 1 G/DL (ref 3.5–5.2)
ALP SERPL-CCNC: 1040 U/L (ref 55–135)
ALT SERPL W/O P-5'-P-CCNC: 60 U/L (ref 10–44)
ANION GAP SERPL CALC-SCNC: 4 MMOL/L (ref 8–16)
ANISOCYTOSIS BLD QL SMEAR: SLIGHT
AST SERPL-CCNC: 144 U/L (ref 10–40)
BASOPHILS # BLD AUTO: 0.03 K/UL (ref 0–0.2)
BASOPHILS NFR BLD: 0.1 % (ref 0–1.9)
BILIRUB SERPL-MCNC: 0.4 MG/DL (ref 0.1–1)
BUN SERPL-MCNC: 47 MG/DL (ref 6–20)
CALCIUM SERPL-MCNC: 7 MG/DL (ref 8.7–10.5)
CHLORIDE SERPL-SCNC: 102 MMOL/L (ref 95–110)
CO2 SERPL-SCNC: 30 MMOL/L (ref 23–29)
CREAT SERPL-MCNC: 1.2 MG/DL (ref 0.5–1.4)
DIFFERENTIAL METHOD: ABNORMAL
EOSINOPHIL # BLD AUTO: 0.1 K/UL (ref 0–0.5)
EOSINOPHIL NFR BLD: 0.4 % (ref 0–8)
ERYTHROCYTE [DISTWIDTH] IN BLOOD BY AUTOMATED COUNT: 16.4 % (ref 11.5–14.5)
EST. GFR  (AFRICAN AMERICAN): 59.2 ML/MIN/1.73 M^2
EST. GFR  (NON AFRICAN AMERICAN): 51.4 ML/MIN/1.73 M^2
GLUCOSE SERPL-MCNC: 218 MG/DL (ref 70–110)
GRAM STN SPEC: NORMAL
GRAM STN SPEC: NORMAL
HCT VFR BLD AUTO: 24.3 % (ref 37–48.5)
HGB BLD-MCNC: 8.1 G/DL (ref 12–16)
HYPOCHROMIA BLD QL SMEAR: ABNORMAL
IMM GRANULOCYTES # BLD AUTO: 0.39 K/UL (ref 0–0.04)
IMM GRANULOCYTES NFR BLD AUTO: 1.9 % (ref 0–0.5)
LYMPHOCYTES # BLD AUTO: 1 K/UL (ref 1–4.8)
LYMPHOCYTES NFR BLD: 5 % (ref 18–48)
MAGNESIUM SERPL-MCNC: 1.8 MG/DL (ref 1.6–2.6)
MCH RBC QN AUTO: 29.3 PG (ref 27–31)
MCHC RBC AUTO-ENTMCNC: 33.3 G/DL (ref 32–36)
MCV RBC AUTO: 88 FL (ref 82–98)
MONOCYTES # BLD AUTO: 2.2 K/UL (ref 0.3–1)
MONOCYTES NFR BLD: 10.8 % (ref 4–15)
NEUTROPHILS # BLD AUTO: 16.4 K/UL (ref 1.8–7.7)
NEUTROPHILS NFR BLD: 81.8 % (ref 38–73)
NRBC BLD-RTO: 0 /100 WBC
OVALOCYTES BLD QL SMEAR: ABNORMAL
PHOSPHATE SERPL-MCNC: 2.2 MG/DL (ref 2.7–4.5)
PLATELET # BLD AUTO: 183 K/UL (ref 150–450)
PMV BLD AUTO: 12.5 FL (ref 9.2–12.9)
POCT GLUCOSE: 104 MG/DL (ref 70–110)
POCT GLUCOSE: 118 MG/DL (ref 70–110)
POCT GLUCOSE: 222 MG/DL (ref 70–110)
POCT GLUCOSE: 224 MG/DL (ref 70–110)
POCT GLUCOSE: 247 MG/DL (ref 70–110)
POCT GLUCOSE: 273 MG/DL (ref 70–110)
POCT GLUCOSE: 292 MG/DL (ref 70–110)
POIKILOCYTOSIS BLD QL SMEAR: SLIGHT
POLYCHROMASIA BLD QL SMEAR: ABNORMAL
POTASSIUM SERPL-SCNC: 4.4 MMOL/L (ref 3.5–5.1)
PROT SERPL-MCNC: 4.9 G/DL (ref 6–8.4)
RBC # BLD AUTO: 2.76 M/UL (ref 4–5.4)
SODIUM SERPL-SCNC: 136 MMOL/L (ref 136–145)
WBC # BLD AUTO: 20.08 K/UL (ref 3.9–12.7)

## 2021-05-27 PROCEDURE — 80053 COMPREHEN METABOLIC PANEL: CPT | Performed by: STUDENT IN AN ORGANIZED HEALTH CARE EDUCATION/TRAINING PROGRAM

## 2021-05-27 PROCEDURE — 63600175 PHARM REV CODE 636 W HCPCS: Performed by: NURSE ANESTHETIST, CERTIFIED REGISTERED

## 2021-05-27 PROCEDURE — 87116 MYCOBACTERIA CULTURE: CPT | Performed by: SURGERY

## 2021-05-27 PROCEDURE — 99232 PR SUBSEQUENT HOSPITAL CARE,LEVL II: ICD-10-PCS | Mod: GC,,, | Performed by: PSYCHIATRY & NEUROLOGY

## 2021-05-27 PROCEDURE — 87206 SMEAR FLUORESCENT/ACID STAI: CPT | Performed by: SURGERY

## 2021-05-27 PROCEDURE — 99024 POSTOP FOLLOW-UP VISIT: CPT | Mod: POP,,, | Performed by: SURGERY

## 2021-05-27 PROCEDURE — 87075 CULTR BACTERIA EXCEPT BLOOD: CPT | Performed by: SURGERY

## 2021-05-27 PROCEDURE — 35903 EXCISION GRAFT EXTREMITY: CPT | Mod: 79,LT,GC, | Performed by: SURGERY

## 2021-05-27 PROCEDURE — 71000033 HC RECOVERY, INTIAL HOUR: Performed by: SURGERY

## 2021-05-27 PROCEDURE — 97530 THERAPEUTIC ACTIVITIES: CPT

## 2021-05-27 PROCEDURE — 36000707: Performed by: SURGERY

## 2021-05-27 PROCEDURE — 87186 SC STD MICRODIL/AGAR DIL: CPT | Mod: 59 | Performed by: SURGERY

## 2021-05-27 PROCEDURE — 35903 PR EXCISION, INFEC GRAFT, EXTREMITY: ICD-10-PCS | Mod: 79,LT,GC, | Performed by: SURGERY

## 2021-05-27 PROCEDURE — 37000008 HC ANESTHESIA 1ST 15 MINUTES: Performed by: SURGERY

## 2021-05-27 PROCEDURE — 84100 ASSAY OF PHOSPHORUS: CPT | Performed by: STUDENT IN AN ORGANIZED HEALTH CARE EDUCATION/TRAINING PROGRAM

## 2021-05-27 PROCEDURE — 87102 FUNGUS ISOLATION CULTURE: CPT | Performed by: SURGERY

## 2021-05-27 PROCEDURE — 97110 THERAPEUTIC EXERCISES: CPT

## 2021-05-27 PROCEDURE — 83735 ASSAY OF MAGNESIUM: CPT | Performed by: STUDENT IN AN ORGANIZED HEALTH CARE EDUCATION/TRAINING PROGRAM

## 2021-05-27 PROCEDURE — 97542 WHEELCHAIR MNGMENT TRAINING: CPT

## 2021-05-27 PROCEDURE — D9220A PRA ANESTHESIA: ICD-10-PCS | Mod: ANES,,, | Performed by: ANESTHESIOLOGY

## 2021-05-27 PROCEDURE — 99024 PR POST-OP FOLLOW-UP VISIT: ICD-10-PCS | Mod: POP,,, | Performed by: SURGERY

## 2021-05-27 PROCEDURE — 25000003 PHARM REV CODE 250: Performed by: SURGERY

## 2021-05-27 PROCEDURE — D9220A PRA ANESTHESIA: Mod: CRNA,,, | Performed by: NURSE ANESTHETIST, CERTIFIED REGISTERED

## 2021-05-27 PROCEDURE — 63600175 PHARM REV CODE 636 W HCPCS: Performed by: STUDENT IN AN ORGANIZED HEALTH CARE EDUCATION/TRAINING PROGRAM

## 2021-05-27 PROCEDURE — 87076 CULTURE ANAEROBE IDENT EACH: CPT | Performed by: SURGERY

## 2021-05-27 PROCEDURE — 37000009 HC ANESTHESIA EA ADD 15 MINS: Performed by: SURGERY

## 2021-05-27 PROCEDURE — 25000003 PHARM REV CODE 250: Performed by: INTERNAL MEDICINE

## 2021-05-27 PROCEDURE — 20600001 HC STEP DOWN PRIVATE ROOM

## 2021-05-27 PROCEDURE — 99232 SBSQ HOSP IP/OBS MODERATE 35: CPT | Mod: GC,,, | Performed by: PSYCHIATRY & NEUROLOGY

## 2021-05-27 PROCEDURE — D9220A PRA ANESTHESIA: ICD-10-PCS | Mod: CRNA,,, | Performed by: NURSE ANESTHETIST, CERTIFIED REGISTERED

## 2021-05-27 PROCEDURE — 87205 SMEAR GRAM STAIN: CPT | Performed by: SURGERY

## 2021-05-27 PROCEDURE — 25000242 PHARM REV CODE 250 ALT 637 W/ HCPCS: Performed by: STUDENT IN AN ORGANIZED HEALTH CARE EDUCATION/TRAINING PROGRAM

## 2021-05-27 PROCEDURE — 87184 SC STD DISK METHOD PER PLATE: CPT | Performed by: SURGERY

## 2021-05-27 PROCEDURE — 85025 COMPLETE CBC W/AUTO DIFF WBC: CPT | Performed by: STUDENT IN AN ORGANIZED HEALTH CARE EDUCATION/TRAINING PROGRAM

## 2021-05-27 PROCEDURE — 87070 CULTURE OTHR SPECIMN AEROBIC: CPT | Performed by: SURGERY

## 2021-05-27 PROCEDURE — 25000003 PHARM REV CODE 250: Performed by: STUDENT IN AN ORGANIZED HEALTH CARE EDUCATION/TRAINING PROGRAM

## 2021-05-27 PROCEDURE — D9220A PRA ANESTHESIA: Mod: ANES,,, | Performed by: ANESTHESIOLOGY

## 2021-05-27 PROCEDURE — 25000003 PHARM REV CODE 250: Performed by: NURSE ANESTHETIST, CERTIFIED REGISTERED

## 2021-05-27 PROCEDURE — 71000015 HC POSTOP RECOV 1ST HR: Performed by: SURGERY

## 2021-05-27 PROCEDURE — 36000706: Performed by: SURGERY

## 2021-05-27 PROCEDURE — 87077 CULTURE AEROBIC IDENTIFY: CPT | Performed by: SURGERY

## 2021-05-27 RX ORDER — OXYCODONE HYDROCHLORIDE 5 MG/1
5 TABLET ORAL EVERY 6 HOURS PRN
Status: DISCONTINUED | OUTPATIENT
Start: 2021-05-27 | End: 2021-06-01

## 2021-05-27 RX ORDER — POLYETHYLENE GLYCOL 3350 17 G/17G
17 POWDER, FOR SOLUTION ORAL DAILY
Status: DISCONTINUED | OUTPATIENT
Start: 2021-05-27 | End: 2021-06-05

## 2021-05-27 RX ORDER — INSULIN ASPART 100 [IU]/ML
8 INJECTION, SOLUTION INTRAVENOUS; SUBCUTANEOUS
Status: DISCONTINUED | OUTPATIENT
Start: 2021-05-28 | End: 2021-05-28

## 2021-05-27 RX ORDER — SODIUM CHLORIDE 0.9 % (FLUSH) 0.9 %
10 SYRINGE (ML) INJECTION
Status: DISCONTINUED | OUTPATIENT
Start: 2021-05-27 | End: 2021-06-08 | Stop reason: HOSPADM

## 2021-05-27 RX ORDER — ONDANSETRON 2 MG/ML
INJECTION INTRAMUSCULAR; INTRAVENOUS
Status: DISCONTINUED | OUTPATIENT
Start: 2021-05-27 | End: 2021-05-30

## 2021-05-27 RX ORDER — LIDOCAINE HYDROCHLORIDE 20 MG/ML
INJECTION, SOLUTION EPIDURAL; INFILTRATION; INTRACAUDAL; PERINEURAL
Status: DISCONTINUED | OUTPATIENT
Start: 2021-05-27 | End: 2021-05-30

## 2021-05-27 RX ORDER — SUCCINYLCHOLINE CHLORIDE 20 MG/ML
INJECTION INTRAMUSCULAR; INTRAVENOUS
Status: DISCONTINUED | OUTPATIENT
Start: 2021-05-27 | End: 2021-05-30

## 2021-05-27 RX ORDER — INSULIN ASPART 100 [IU]/ML
8 INJECTION, SOLUTION INTRAVENOUS; SUBCUTANEOUS
Status: DISCONTINUED | OUTPATIENT
Start: 2021-05-27 | End: 2021-05-28

## 2021-05-27 RX ORDER — SODIUM CHLORIDE 9 MG/ML
INJECTION, SOLUTION INTRAVENOUS CONTINUOUS
Status: DISCONTINUED | OUTPATIENT
Start: 2021-05-27 | End: 2021-06-01

## 2021-05-27 RX ORDER — FENTANYL CITRATE 50 UG/ML
25 INJECTION, SOLUTION INTRAMUSCULAR; INTRAVENOUS EVERY 5 MIN PRN
Status: DISCONTINUED | OUTPATIENT
Start: 2021-05-27 | End: 2021-05-28

## 2021-05-27 RX ORDER — ONDANSETRON 2 MG/ML
4 INJECTION INTRAMUSCULAR; INTRAVENOUS DAILY PRN
Status: DISCONTINUED | OUTPATIENT
Start: 2021-05-27 | End: 2021-05-28

## 2021-05-27 RX ORDER — MIDAZOLAM HYDROCHLORIDE 1 MG/ML
INJECTION, SOLUTION INTRAMUSCULAR; INTRAVENOUS
Status: DISCONTINUED | OUTPATIENT
Start: 2021-05-27 | End: 2021-05-30

## 2021-05-27 RX ORDER — CEFAZOLIN SODIUM 1 G/3ML
INJECTION, POWDER, FOR SOLUTION INTRAMUSCULAR; INTRAVENOUS
Status: DISCONTINUED | OUTPATIENT
Start: 2021-05-27 | End: 2021-05-30

## 2021-05-27 RX ADMIN — Medication 1 PACKET: at 09:05

## 2021-05-27 RX ADMIN — HYDROMORPHONE HYDROCHLORIDE 0.5 MG: 1 INJECTION, SOLUTION INTRAMUSCULAR; INTRAVENOUS; SUBCUTANEOUS at 10:05

## 2021-05-27 RX ADMIN — POLYETHYLENE GLYCOL 3350 17 G: 17 POWDER, FOR SOLUTION ORAL at 01:05

## 2021-05-27 RX ADMIN — INSULIN ASPART 8 UNITS: 100 INJECTION, SOLUTION INTRAVENOUS; SUBCUTANEOUS at 11:05

## 2021-05-27 RX ADMIN — SODIUM BICARBONATE 650 MG TABLET 1950 MG: at 09:05

## 2021-05-27 RX ADMIN — INSULIN ASPART 3 UNITS: 100 INJECTION, SOLUTION INTRAVENOUS; SUBCUTANEOUS at 11:05

## 2021-05-27 RX ADMIN — HYDROMORPHONE HYDROCHLORIDE 0.5 MG: 1 INJECTION, SOLUTION INTRAMUSCULAR; INTRAVENOUS; SUBCUTANEOUS at 11:05

## 2021-05-27 RX ADMIN — NYSTATIN: 100000 POWDER TOPICAL at 11:05

## 2021-05-27 RX ADMIN — CEFAZOLIN 2 G: 330 INJECTION, POWDER, FOR SOLUTION INTRAMUSCULAR; INTRAVENOUS at 09:05

## 2021-05-27 RX ADMIN — MIDAZOLAM 4 MG: 1 INJECTION INTRAMUSCULAR; INTRAVENOUS at 08:05

## 2021-05-27 RX ADMIN — ONDANSETRON 4 MG: 2 INJECTION INTRAMUSCULAR; INTRAVENOUS at 09:05

## 2021-05-27 RX ADMIN — FENTANYL CITRATE 100 MCG: 50 INJECTION, SOLUTION INTRAMUSCULAR; INTRAVENOUS at 08:05

## 2021-05-27 RX ADMIN — LIDOCAINE HYDROCHLORIDE 100 MG: 20 INJECTION, SOLUTION EPIDURAL; INFILTRATION; INTRACAUDAL at 08:05

## 2021-05-27 RX ADMIN — ONDANSETRON 500 MG: 2 INJECTION INTRAMUSCULAR; INTRAVENOUS at 09:05

## 2021-05-27 RX ADMIN — INSULIN ASPART 1 UNITS: 100 INJECTION, SOLUTION INTRAVENOUS; SUBCUTANEOUS at 01:05

## 2021-05-27 RX ADMIN — HYDROMORPHONE HYDROCHLORIDE 0.5 MG: 1 INJECTION, SOLUTION INTRAMUSCULAR; INTRAVENOUS; SUBCUTANEOUS at 03:05

## 2021-05-27 RX ADMIN — NYSTATIN: 100000 POWDER TOPICAL at 10:05

## 2021-05-27 RX ADMIN — SUCCINYLCHOLINE CHLORIDE 120 MG: 20 INJECTION, SOLUTION INTRAMUSCULAR; INTRAVENOUS; PARENTERAL at 08:05

## 2021-05-27 RX ADMIN — SODIUM PHOSPHATE, MONOBASIC, MONOHYDRATE 30 MMOL: 276; 142 INJECTION, SOLUTION INTRAVENOUS at 07:05

## 2021-05-27 RX ADMIN — MIDODRINE HYDROCHLORIDE 2.5 MG: 2.5 TABLET ORAL at 09:05

## 2021-05-27 RX ADMIN — INSULIN ASPART 6 UNITS: 100 INJECTION, SOLUTION INTRAVENOUS; SUBCUTANEOUS at 09:05

## 2021-05-27 RX ADMIN — INSULIN ASPART 2 UNITS: 100 INJECTION, SOLUTION INTRAVENOUS; SUBCUTANEOUS at 05:05

## 2021-05-27 RX ADMIN — SODIUM CHLORIDE: 0.9 INJECTION, SOLUTION INTRAVENOUS at 11:05

## 2021-05-27 RX ADMIN — HYDROMORPHONE HYDROCHLORIDE 0.5 MG: 1 INJECTION, SOLUTION INTRAMUSCULAR; INTRAVENOUS; SUBCUTANEOUS at 12:05

## 2021-05-27 RX ADMIN — INSULIN ASPART 6 UNITS: 100 INJECTION, SOLUTION INTRAVENOUS; SUBCUTANEOUS at 01:05

## 2021-05-27 RX ADMIN — INSULIN ASPART 8 UNITS: 100 INJECTION, SOLUTION INTRAVENOUS; SUBCUTANEOUS at 12:05

## 2021-05-28 LAB
ALBUMIN SERPL BCP-MCNC: 1 G/DL (ref 3.5–5.2)
ALP SERPL-CCNC: 906 U/L (ref 55–135)
ALT SERPL W/O P-5'-P-CCNC: 69 U/L (ref 10–44)
AMMONIA PLAS-SCNC: 22 UMOL/L (ref 10–50)
ANION GAP SERPL CALC-SCNC: 10 MMOL/L (ref 8–16)
ANISOCYTOSIS BLD QL SMEAR: SLIGHT
AST SERPL-CCNC: 165 U/L (ref 10–40)
BACTERIA #/AREA URNS AUTO: ABNORMAL /HPF
BASO STIPL BLD QL SMEAR: ABNORMAL
BASOPHILS # BLD AUTO: 0.04 K/UL (ref 0–0.2)
BASOPHILS NFR BLD: 0.1 % (ref 0–1.9)
BILIRUB SERPL-MCNC: 0.4 MG/DL (ref 0.1–1)
BILIRUB UR QL STRIP: NEGATIVE
BUN SERPL-MCNC: 41 MG/DL (ref 6–20)
BURR CELLS BLD QL SMEAR: ABNORMAL
CALCIUM SERPL-MCNC: 7.7 MG/DL (ref 8.7–10.5)
CHLORIDE SERPL-SCNC: 104 MMOL/L (ref 95–110)
CLARITY UR REFRACT.AUTO: ABNORMAL
CO2 SERPL-SCNC: 26 MMOL/L (ref 23–29)
COLOR UR AUTO: YELLOW
CREAT SERPL-MCNC: 1.1 MG/DL (ref 0.5–1.4)
DIFFERENTIAL METHOD: ABNORMAL
EOSINOPHIL # BLD AUTO: 0 K/UL (ref 0–0.5)
EOSINOPHIL NFR BLD: 0.1 % (ref 0–8)
ERYTHROCYTE [DISTWIDTH] IN BLOOD BY AUTOMATED COUNT: 16.4 % (ref 11.5–14.5)
EST. GFR  (AFRICAN AMERICAN): >60 ML/MIN/1.73 M^2
EST. GFR  (NON AFRICAN AMERICAN): 57.1 ML/MIN/1.73 M^2
GLUCOSE SERPL-MCNC: 59 MG/DL (ref 70–110)
GLUCOSE UR QL STRIP: ABNORMAL
GRAN CASTS UR QL COMP ASSIST: 3 /LPF
HCT VFR BLD AUTO: 21.8 % (ref 37–48.5)
HGB BLD-MCNC: 7.2 G/DL (ref 12–16)
HGB UR QL STRIP: ABNORMAL
HYALINE CASTS UR QL AUTO: 1 /LPF
IMM GRANULOCYTES # BLD AUTO: 0.38 K/UL (ref 0–0.04)
IMM GRANULOCYTES NFR BLD AUTO: 1.4 % (ref 0–0.5)
KETONES UR QL STRIP: NEGATIVE
LACTATE SERPL-SCNC: 0.8 MMOL/L (ref 0.5–2.2)
LEUKOCYTE ESTERASE UR QL STRIP: ABNORMAL
LYMPHOCYTES # BLD AUTO: 1.2 K/UL (ref 1–4.8)
LYMPHOCYTES NFR BLD: 4.4 % (ref 18–48)
MAGNESIUM SERPL-MCNC: 1.9 MG/DL (ref 1.6–2.6)
MCH RBC QN AUTO: 29.3 PG (ref 27–31)
MCHC RBC AUTO-ENTMCNC: 33 G/DL (ref 32–36)
MCV RBC AUTO: 89 FL (ref 82–98)
MICROSCOPIC COMMENT: ABNORMAL
MONOCYTES # BLD AUTO: 2.8 K/UL (ref 0.3–1)
MONOCYTES NFR BLD: 10 % (ref 4–15)
NEUTROPHILS # BLD AUTO: 23.4 K/UL (ref 1.8–7.7)
NEUTROPHILS NFR BLD: 84 % (ref 38–73)
NITRITE UR QL STRIP: NEGATIVE
NRBC BLD-RTO: 0 /100 WBC
OVALOCYTES BLD QL SMEAR: ABNORMAL
PH UR STRIP: 5 [PH] (ref 5–8)
PHOSPHATE SERPL-MCNC: 3.8 MG/DL (ref 2.7–4.5)
PLATELET # BLD AUTO: 244 K/UL (ref 150–450)
PLATELET BLD QL SMEAR: ABNORMAL
PMV BLD AUTO: 11.5 FL (ref 9.2–12.9)
POCT GLUCOSE: 200 MG/DL (ref 70–110)
POCT GLUCOSE: 220 MG/DL (ref 70–110)
POCT GLUCOSE: 220 MG/DL (ref 70–110)
POCT GLUCOSE: 237 MG/DL (ref 70–110)
POCT GLUCOSE: 36 MG/DL (ref 70–110)
POCT GLUCOSE: 51 MG/DL (ref 70–110)
POCT GLUCOSE: 58 MG/DL (ref 70–110)
POCT GLUCOSE: 69 MG/DL (ref 70–110)
POCT GLUCOSE: 74 MG/DL (ref 70–110)
POCT GLUCOSE: 75 MG/DL (ref 70–110)
POIKILOCYTOSIS BLD QL SMEAR: SLIGHT
POTASSIUM SERPL-SCNC: 4.5 MMOL/L (ref 3.5–5.1)
PROT SERPL-MCNC: 5.3 G/DL (ref 6–8.4)
PROT UR QL STRIP: ABNORMAL
RBC # BLD AUTO: 2.46 M/UL (ref 4–5.4)
RBC #/AREA URNS AUTO: 2 /HPF (ref 0–4)
SODIUM SERPL-SCNC: 140 MMOL/L (ref 136–145)
SP GR UR STRIP: 1.01 (ref 1–1.03)
SQUAMOUS #/AREA URNS AUTO: 0 /HPF
URN SPEC COLLECT METH UR: ABNORMAL
WBC # BLD AUTO: 27.9 K/UL (ref 3.9–12.7)
WBC #/AREA URNS AUTO: 40 /HPF (ref 0–5)
YEAST UR QL AUTO: ABNORMAL

## 2021-05-28 PROCEDURE — 25000003 PHARM REV CODE 250: Performed by: STUDENT IN AN ORGANIZED HEALTH CARE EDUCATION/TRAINING PROGRAM

## 2021-05-28 PROCEDURE — 97530 THERAPEUTIC ACTIVITIES: CPT

## 2021-05-28 PROCEDURE — 84100 ASSAY OF PHOSPHORUS: CPT | Performed by: STUDENT IN AN ORGANIZED HEALTH CARE EDUCATION/TRAINING PROGRAM

## 2021-05-28 PROCEDURE — 85025 COMPLETE CBC W/AUTO DIFF WBC: CPT | Performed by: STUDENT IN AN ORGANIZED HEALTH CARE EDUCATION/TRAINING PROGRAM

## 2021-05-28 PROCEDURE — 82140 ASSAY OF AMMONIA: CPT | Performed by: STUDENT IN AN ORGANIZED HEALTH CARE EDUCATION/TRAINING PROGRAM

## 2021-05-28 PROCEDURE — 87086 URINE CULTURE/COLONY COUNT: CPT | Performed by: STUDENT IN AN ORGANIZED HEALTH CARE EDUCATION/TRAINING PROGRAM

## 2021-05-28 PROCEDURE — 63600175 PHARM REV CODE 636 W HCPCS: Performed by: NURSE PRACTITIONER

## 2021-05-28 PROCEDURE — 36415 COLL VENOUS BLD VENIPUNCTURE: CPT | Performed by: STUDENT IN AN ORGANIZED HEALTH CARE EDUCATION/TRAINING PROGRAM

## 2021-05-28 PROCEDURE — 20600001 HC STEP DOWN PRIVATE ROOM

## 2021-05-28 PROCEDURE — 25000003 PHARM REV CODE 250: Performed by: NURSE PRACTITIONER

## 2021-05-28 PROCEDURE — 99233 PR SUBSEQUENT HOSPITAL CARE,LEVL III: ICD-10-PCS | Mod: ,,, | Performed by: NURSE PRACTITIONER

## 2021-05-28 PROCEDURE — 80053 COMPREHEN METABOLIC PANEL: CPT | Performed by: STUDENT IN AN ORGANIZED HEALTH CARE EDUCATION/TRAINING PROGRAM

## 2021-05-28 PROCEDURE — 83735 ASSAY OF MAGNESIUM: CPT | Performed by: STUDENT IN AN ORGANIZED HEALTH CARE EDUCATION/TRAINING PROGRAM

## 2021-05-28 PROCEDURE — 97110 THERAPEUTIC EXERCISES: CPT

## 2021-05-28 PROCEDURE — 99024 POSTOP FOLLOW-UP VISIT: CPT | Mod: POP,,, | Performed by: SURGERY

## 2021-05-28 PROCEDURE — 99024 PR POST-OP FOLLOW-UP VISIT: ICD-10-PCS | Mod: POP,,, | Performed by: SURGERY

## 2021-05-28 PROCEDURE — 83605 ASSAY OF LACTIC ACID: CPT | Performed by: STUDENT IN AN ORGANIZED HEALTH CARE EDUCATION/TRAINING PROGRAM

## 2021-05-28 PROCEDURE — 99223 1ST HOSP IP/OBS HIGH 75: CPT | Mod: GC,,, | Performed by: INTERNAL MEDICINE

## 2021-05-28 PROCEDURE — 92526 ORAL FUNCTION THERAPY: CPT

## 2021-05-28 PROCEDURE — 63600175 PHARM REV CODE 636 W HCPCS: Performed by: STUDENT IN AN ORGANIZED HEALTH CARE EDUCATION/TRAINING PROGRAM

## 2021-05-28 PROCEDURE — 99233 SBSQ HOSP IP/OBS HIGH 50: CPT | Mod: ,,, | Performed by: NURSE PRACTITIONER

## 2021-05-28 PROCEDURE — 81001 URINALYSIS AUTO W/SCOPE: CPT | Performed by: STUDENT IN AN ORGANIZED HEALTH CARE EDUCATION/TRAINING PROGRAM

## 2021-05-28 PROCEDURE — 99223 PR INITIAL HOSPITAL CARE,LEVL III: ICD-10-PCS | Mod: GC,,, | Performed by: INTERNAL MEDICINE

## 2021-05-28 RX ORDER — CEFEPIME HYDROCHLORIDE 2 G/1
2 INJECTION, POWDER, FOR SOLUTION INTRAVENOUS
Status: DISCONTINUED | OUTPATIENT
Start: 2021-05-28 | End: 2021-05-29

## 2021-05-28 RX ORDER — GLUCAGON 1 MG
1 KIT INJECTION
Status: DISCONTINUED | OUTPATIENT
Start: 2021-05-28 | End: 2021-05-28

## 2021-05-28 RX ORDER — CEFEPIME HYDROCHLORIDE 2 G/1
2 INJECTION, POWDER, FOR SOLUTION INTRAVENOUS
Status: CANCELLED | OUTPATIENT
Start: 2021-05-28

## 2021-05-28 RX ORDER — IBUPROFEN 200 MG
24 TABLET ORAL
Status: DISCONTINUED | OUTPATIENT
Start: 2021-05-28 | End: 2021-05-28

## 2021-05-28 RX ORDER — IBUPROFEN 200 MG
16 TABLET ORAL
Status: DISCONTINUED | OUTPATIENT
Start: 2021-05-28 | End: 2021-05-28

## 2021-05-28 RX ORDER — IBUPROFEN 200 MG
16 TABLET ORAL
Status: DISCONTINUED | OUTPATIENT
Start: 2021-05-28 | End: 2021-06-08 | Stop reason: HOSPADM

## 2021-05-28 RX ORDER — DEXTROSE MONOHYDRATE 100 MG/ML
INJECTION, SOLUTION INTRAVENOUS CONTINUOUS
Status: DISCONTINUED | OUTPATIENT
Start: 2021-05-28 | End: 2021-05-28

## 2021-05-28 RX ORDER — INSULIN ASPART 100 [IU]/ML
0-5 INJECTION, SOLUTION INTRAVENOUS; SUBCUTANEOUS
Status: DISCONTINUED | OUTPATIENT
Start: 2021-05-28 | End: 2021-05-28

## 2021-05-28 RX ORDER — GLUCAGON 1 MG
1 KIT INJECTION
Status: DISCONTINUED | OUTPATIENT
Start: 2021-05-28 | End: 2021-06-08 | Stop reason: HOSPADM

## 2021-05-28 RX ORDER — IBUPROFEN 200 MG
24 TABLET ORAL
Status: DISCONTINUED | OUTPATIENT
Start: 2021-05-28 | End: 2021-06-08 | Stop reason: HOSPADM

## 2021-05-28 RX ORDER — INSULIN ASPART 100 [IU]/ML
0-5 INJECTION, SOLUTION INTRAVENOUS; SUBCUTANEOUS
Status: DISCONTINUED | OUTPATIENT
Start: 2021-05-28 | End: 2021-06-08 | Stop reason: HOSPADM

## 2021-05-28 RX ADMIN — CEFEPIME 2 G: 2 INJECTION, POWDER, FOR SOLUTION INTRAVENOUS at 04:05

## 2021-05-28 RX ADMIN — NYSTATIN: 100000 POWDER TOPICAL at 09:05

## 2021-05-28 RX ADMIN — HYDROMORPHONE HYDROCHLORIDE 0.5 MG: 1 INJECTION, SOLUTION INTRAMUSCULAR; INTRAVENOUS; SUBCUTANEOUS at 05:05

## 2021-05-28 RX ADMIN — SODIUM CHLORIDE: 0.9 INJECTION, SOLUTION INTRAVENOUS at 05:05

## 2021-05-28 RX ADMIN — DEXTROSE MONOHYDRATE 12.5 G: 25 INJECTION, SOLUTION INTRAVENOUS at 05:05

## 2021-05-28 RX ADMIN — INSULIN ASPART 1 UNITS: 100 INJECTION, SOLUTION INTRAVENOUS; SUBCUTANEOUS at 09:05

## 2021-05-28 RX ADMIN — DEXTROSE: 10 SOLUTION INTRAVENOUS at 10:05

## 2021-05-28 RX ADMIN — ATORVASTATIN CALCIUM 80 MG: 20 TABLET, FILM COATED ORAL at 09:05

## 2021-05-28 RX ADMIN — HYDROMORPHONE HYDROCHLORIDE 0.5 MG: 1 INJECTION, SOLUTION INTRAMUSCULAR; INTRAVENOUS; SUBCUTANEOUS at 03:05

## 2021-05-28 RX ADMIN — MIDODRINE HYDROCHLORIDE 2.5 MG: 2.5 TABLET ORAL at 09:05

## 2021-05-28 RX ADMIN — HYDROMORPHONE HYDROCHLORIDE 0.5 MG: 1 INJECTION, SOLUTION INTRAMUSCULAR; INTRAVENOUS; SUBCUTANEOUS at 10:05

## 2021-05-28 RX ADMIN — MELATONIN TAB 3 MG 6 MG: 3 TAB at 03:05

## 2021-05-28 RX ADMIN — INSULIN ASPART 8 UNITS: 100 INJECTION, SOLUTION INTRAVENOUS; SUBCUTANEOUS at 03:05

## 2021-05-28 RX ADMIN — INSULIN ASPART 2 UNITS: 100 INJECTION, SOLUTION INTRAVENOUS; SUBCUTANEOUS at 04:05

## 2021-05-28 RX ADMIN — HYDROMORPHONE HYDROCHLORIDE 0.5 MG: 1 INJECTION, SOLUTION INTRAMUSCULAR; INTRAVENOUS; SUBCUTANEOUS at 09:05

## 2021-05-28 RX ADMIN — OXYCODONE 5 MG: 5 TABLET ORAL at 09:05

## 2021-05-29 LAB
ABO + RH BLD: NORMAL
ALBUMIN SERPL BCP-MCNC: 0.9 G/DL (ref 3.5–5.2)
ALP SERPL-CCNC: 713 U/L (ref 55–135)
ALT SERPL W/O P-5'-P-CCNC: 45 U/L (ref 10–44)
ANION GAP SERPL CALC-SCNC: 9 MMOL/L (ref 8–16)
ANISOCYTOSIS BLD QL SMEAR: SLIGHT
ANISOCYTOSIS BLD QL SMEAR: SLIGHT
AST SERPL-CCNC: 100 U/L (ref 10–40)
BACTERIA UR CULT: NO GROWTH
BASO STIPL BLD QL SMEAR: ABNORMAL
BASOPHILS # BLD AUTO: 0.03 K/UL (ref 0–0.2)
BASOPHILS # BLD AUTO: 0.04 K/UL (ref 0–0.2)
BASOPHILS NFR BLD: 0.1 % (ref 0–1.9)
BASOPHILS NFR BLD: 0.2 % (ref 0–1.9)
BILIRUB SERPL-MCNC: 0.6 MG/DL (ref 0.1–1)
BLD GP AB SCN CELLS X3 SERPL QL: NORMAL
BLD PROD TYP BPU: NORMAL
BLD PROD TYP BPU: NORMAL
BLOOD UNIT EXPIRATION DATE: NORMAL
BLOOD UNIT EXPIRATION DATE: NORMAL
BLOOD UNIT TYPE CODE: 5100
BLOOD UNIT TYPE CODE: 5100
BLOOD UNIT TYPE: NORMAL
BLOOD UNIT TYPE: NORMAL
BUN SERPL-MCNC: 37 MG/DL (ref 6–20)
CALCIUM SERPL-MCNC: 7 MG/DL (ref 8.7–10.5)
CHLORIDE SERPL-SCNC: 103 MMOL/L (ref 95–110)
CO2 SERPL-SCNC: 24 MMOL/L (ref 23–29)
CODING SYSTEM: NORMAL
CODING SYSTEM: NORMAL
CREAT SERPL-MCNC: 1.1 MG/DL (ref 0.5–1.4)
DIFFERENTIAL METHOD: ABNORMAL
DIFFERENTIAL METHOD: ABNORMAL
DISPENSE STATUS: NORMAL
DISPENSE STATUS: NORMAL
EOSINOPHIL # BLD AUTO: 0.1 K/UL (ref 0–0.5)
EOSINOPHIL # BLD AUTO: 0.1 K/UL (ref 0–0.5)
EOSINOPHIL NFR BLD: 0.5 % (ref 0–8)
EOSINOPHIL NFR BLD: 0.6 % (ref 0–8)
ERYTHROCYTE [DISTWIDTH] IN BLOOD BY AUTOMATED COUNT: 16.2 % (ref 11.5–14.5)
ERYTHROCYTE [DISTWIDTH] IN BLOOD BY AUTOMATED COUNT: 17.5 % (ref 11.5–14.5)
EST. GFR  (AFRICAN AMERICAN): >60 ML/MIN/1.73 M^2
EST. GFR  (NON AFRICAN AMERICAN): 57.1 ML/MIN/1.73 M^2
GLUCOSE SERPL-MCNC: 182 MG/DL (ref 70–110)
HCT VFR BLD AUTO: 17.5 % (ref 37–48.5)
HCT VFR BLD AUTO: 21.8 % (ref 37–48.5)
HGB BLD-MCNC: 5.6 G/DL (ref 12–16)
HGB BLD-MCNC: 7 G/DL (ref 12–16)
HYPOCHROMIA BLD QL SMEAR: ABNORMAL
IMM GRANULOCYTES # BLD AUTO: 0.21 K/UL (ref 0–0.04)
IMM GRANULOCYTES # BLD AUTO: 0.27 K/UL (ref 0–0.04)
IMM GRANULOCYTES NFR BLD AUTO: 0.8 % (ref 0–0.5)
IMM GRANULOCYTES NFR BLD AUTO: 1.2 % (ref 0–0.5)
LYMPHOCYTES # BLD AUTO: 1.1 K/UL (ref 1–4.8)
LYMPHOCYTES # BLD AUTO: 1.1 K/UL (ref 1–4.8)
LYMPHOCYTES NFR BLD: 4.4 % (ref 18–48)
LYMPHOCYTES NFR BLD: 4.7 % (ref 18–48)
MAGNESIUM SERPL-MCNC: 1.9 MG/DL (ref 1.6–2.6)
MCH RBC QN AUTO: 28.1 PG (ref 27–31)
MCH RBC QN AUTO: 29.5 PG (ref 27–31)
MCHC RBC AUTO-ENTMCNC: 32 G/DL (ref 32–36)
MCHC RBC AUTO-ENTMCNC: 32.1 G/DL (ref 32–36)
MCV RBC AUTO: 88 FL (ref 82–98)
MCV RBC AUTO: 92 FL (ref 82–98)
MONOCYTES # BLD AUTO: 2.2 K/UL (ref 0.3–1)
MONOCYTES # BLD AUTO: 2.4 K/UL (ref 0.3–1)
MONOCYTES NFR BLD: 10.3 % (ref 4–15)
MONOCYTES NFR BLD: 8.6 % (ref 4–15)
NEUTROPHILS # BLD AUTO: 19 K/UL (ref 1.8–7.7)
NEUTROPHILS # BLD AUTO: 21.3 K/UL (ref 1.8–7.7)
NEUTROPHILS NFR BLD: 83.1 % (ref 38–73)
NEUTROPHILS NFR BLD: 85.5 % (ref 38–73)
NRBC BLD-RTO: 0 /100 WBC
NRBC BLD-RTO: 0 /100 WBC
OVALOCYTES BLD QL SMEAR: ABNORMAL
PHOSPHATE SERPL-MCNC: 3.4 MG/DL (ref 2.7–4.5)
PLATELET # BLD AUTO: 318 K/UL (ref 150–450)
PLATELET # BLD AUTO: 359 K/UL (ref 150–450)
PLATELET BLD QL SMEAR: ABNORMAL
PLATELET BLD QL SMEAR: ABNORMAL
PMV BLD AUTO: 10.8 FL (ref 9.2–12.9)
PMV BLD AUTO: 11 FL (ref 9.2–12.9)
POCT GLUCOSE: 181 MG/DL (ref 70–110)
POCT GLUCOSE: 187 MG/DL (ref 70–110)
POCT GLUCOSE: 194 MG/DL (ref 70–110)
POCT GLUCOSE: 209 MG/DL (ref 70–110)
POCT GLUCOSE: 246 MG/DL (ref 70–110)
POIKILOCYTOSIS BLD QL SMEAR: SLIGHT
POLYCHROMASIA BLD QL SMEAR: ABNORMAL
POTASSIUM SERPL-SCNC: 4.6 MMOL/L (ref 3.5–5.1)
PROT SERPL-MCNC: 5 G/DL (ref 6–8.4)
RBC # BLD AUTO: 1.9 M/UL (ref 4–5.4)
RBC # BLD AUTO: 2.49 M/UL (ref 4–5.4)
SODIUM SERPL-SCNC: 136 MMOL/L (ref 136–145)
TRANS ERYTHROCYTES VOL PATIENT: NORMAL ML
TRANS ERYTHROCYTES VOL PATIENT: NORMAL ML
WBC # BLD AUTO: 22.86 K/UL (ref 3.9–12.7)
WBC # BLD AUTO: 24.94 K/UL (ref 3.9–12.7)

## 2021-05-29 PROCEDURE — 63600175 PHARM REV CODE 636 W HCPCS: Performed by: STUDENT IN AN ORGANIZED HEALTH CARE EDUCATION/TRAINING PROGRAM

## 2021-05-29 PROCEDURE — 99232 PR SUBSEQUENT HOSPITAL CARE,LEVL II: ICD-10-PCS | Mod: GC,,, | Performed by: INTERNAL MEDICINE

## 2021-05-29 PROCEDURE — 83735 ASSAY OF MAGNESIUM: CPT | Performed by: STUDENT IN AN ORGANIZED HEALTH CARE EDUCATION/TRAINING PROGRAM

## 2021-05-29 PROCEDURE — 86900 BLOOD TYPING SEROLOGIC ABO: CPT | Performed by: SURGERY

## 2021-05-29 PROCEDURE — 85025 COMPLETE CBC W/AUTO DIFF WBC: CPT | Mod: 91 | Performed by: STUDENT IN AN ORGANIZED HEALTH CARE EDUCATION/TRAINING PROGRAM

## 2021-05-29 PROCEDURE — 25000003 PHARM REV CODE 250: Performed by: STUDENT IN AN ORGANIZED HEALTH CARE EDUCATION/TRAINING PROGRAM

## 2021-05-29 PROCEDURE — P9021 RED BLOOD CELLS UNIT: HCPCS | Performed by: STUDENT IN AN ORGANIZED HEALTH CARE EDUCATION/TRAINING PROGRAM

## 2021-05-29 PROCEDURE — 84100 ASSAY OF PHOSPHORUS: CPT | Performed by: STUDENT IN AN ORGANIZED HEALTH CARE EDUCATION/TRAINING PROGRAM

## 2021-05-29 PROCEDURE — 25000003 PHARM REV CODE 250: Performed by: SURGERY

## 2021-05-29 PROCEDURE — 20600001 HC STEP DOWN PRIVATE ROOM

## 2021-05-29 PROCEDURE — 80053 COMPREHEN METABOLIC PANEL: CPT | Performed by: STUDENT IN AN ORGANIZED HEALTH CARE EDUCATION/TRAINING PROGRAM

## 2021-05-29 PROCEDURE — 63600175 PHARM REV CODE 636 W HCPCS: Performed by: SURGERY

## 2021-05-29 PROCEDURE — 36430 TRANSFUSION BLD/BLD COMPNT: CPT

## 2021-05-29 PROCEDURE — 27000221 HC OXYGEN, UP TO 24 HOURS

## 2021-05-29 PROCEDURE — 99232 SBSQ HOSP IP/OBS MODERATE 35: CPT | Mod: GC,,, | Performed by: INTERNAL MEDICINE

## 2021-05-29 PROCEDURE — 85025 COMPLETE CBC W/AUTO DIFF WBC: CPT | Performed by: STUDENT IN AN ORGANIZED HEALTH CARE EDUCATION/TRAINING PROGRAM

## 2021-05-29 RX ORDER — HYDROCODONE BITARTRATE AND ACETAMINOPHEN 500; 5 MG/1; MG/1
TABLET ORAL
Status: DISCONTINUED | OUTPATIENT
Start: 2021-05-29 | End: 2021-06-08 | Stop reason: HOSPADM

## 2021-05-29 RX ORDER — CEFEPIME HYDROCHLORIDE 2 G/1
2 INJECTION, POWDER, FOR SOLUTION INTRAVENOUS
Status: DISCONTINUED | OUTPATIENT
Start: 2021-05-29 | End: 2021-06-01

## 2021-05-29 RX ADMIN — HYDROMORPHONE HYDROCHLORIDE 0.5 MG: 1 INJECTION, SOLUTION INTRAMUSCULAR; INTRAVENOUS; SUBCUTANEOUS at 10:05

## 2021-05-29 RX ADMIN — POLYETHYLENE GLYCOL 3350 17 G: 17 POWDER, FOR SOLUTION ORAL at 08:05

## 2021-05-29 RX ADMIN — SODIUM CHLORIDE: 0.9 INJECTION, SOLUTION INTRAVENOUS at 02:05

## 2021-05-29 RX ADMIN — CEFEPIME 2 G: 2 INJECTION, POWDER, FOR SOLUTION INTRAVENOUS at 08:05

## 2021-05-29 RX ADMIN — NYSTATIN: 100000 POWDER TOPICAL at 08:05

## 2021-05-29 RX ADMIN — HYDROMORPHONE HYDROCHLORIDE 0.5 MG: 1 INJECTION, SOLUTION INTRAMUSCULAR; INTRAVENOUS; SUBCUTANEOUS at 08:05

## 2021-05-29 RX ADMIN — MIDODRINE HYDROCHLORIDE 2.5 MG: 2.5 TABLET ORAL at 10:05

## 2021-05-29 RX ADMIN — ATORVASTATIN CALCIUM 80 MG: 20 TABLET, FILM COATED ORAL at 10:05

## 2021-05-29 RX ADMIN — INSULIN ASPART 1 UNITS: 100 INJECTION, SOLUTION INTRAVENOUS; SUBCUTANEOUS at 08:05

## 2021-05-29 RX ADMIN — INSULIN ASPART 2 UNITS: 100 INJECTION, SOLUTION INTRAVENOUS; SUBCUTANEOUS at 04:05

## 2021-05-29 RX ADMIN — CEFEPIME 2 G: 2 INJECTION, POWDER, FOR SOLUTION INTRAVENOUS at 03:05

## 2021-05-29 RX ADMIN — OXYCODONE 5 MG: 5 TABLET ORAL at 03:05

## 2021-05-29 RX ADMIN — MELATONIN TAB 3 MG 6 MG: 3 TAB at 10:05

## 2021-05-29 RX ADMIN — CEFEPIME 2 G: 2 INJECTION, POWDER, FOR SOLUTION INTRAVENOUS at 11:05

## 2021-05-30 LAB
ALBUMIN SERPL BCP-MCNC: 1 G/DL (ref 3.5–5.2)
ALP SERPL-CCNC: 574 U/L (ref 55–135)
ALT SERPL W/O P-5'-P-CCNC: 37 U/L (ref 10–44)
ANION GAP SERPL CALC-SCNC: 10 MMOL/L (ref 8–16)
AST SERPL-CCNC: 70 U/L (ref 10–40)
BASOPHILS # BLD AUTO: 0.03 K/UL (ref 0–0.2)
BASOPHILS NFR BLD: 0.1 % (ref 0–1.9)
BILIRUB SERPL-MCNC: 0.8 MG/DL (ref 0.1–1)
BUN SERPL-MCNC: 27 MG/DL (ref 6–20)
CALCIUM SERPL-MCNC: 7.2 MG/DL (ref 8.7–10.5)
CHLORIDE SERPL-SCNC: 104 MMOL/L (ref 95–110)
CO2 SERPL-SCNC: 21 MMOL/L (ref 23–29)
CREAT SERPL-MCNC: 0.9 MG/DL (ref 0.5–1.4)
DIFFERENTIAL METHOD: ABNORMAL
EOSINOPHIL # BLD AUTO: 0.1 K/UL (ref 0–0.5)
EOSINOPHIL NFR BLD: 0.4 % (ref 0–8)
ERYTHROCYTE [DISTWIDTH] IN BLOOD BY AUTOMATED COUNT: 17.3 % (ref 11.5–14.5)
EST. GFR  (AFRICAN AMERICAN): >60 ML/MIN/1.73 M^2
EST. GFR  (NON AFRICAN AMERICAN): >60 ML/MIN/1.73 M^2
GLUCOSE SERPL-MCNC: 253 MG/DL (ref 70–110)
HCT VFR BLD AUTO: 24.5 % (ref 37–48.5)
HGB BLD-MCNC: 7.8 G/DL (ref 12–16)
IMM GRANULOCYTES # BLD AUTO: 0.25 K/UL (ref 0–0.04)
IMM GRANULOCYTES NFR BLD AUTO: 1.1 % (ref 0–0.5)
LYMPHOCYTES # BLD AUTO: 0.9 K/UL (ref 1–4.8)
LYMPHOCYTES NFR BLD: 3.7 % (ref 18–48)
MAGNESIUM SERPL-MCNC: 1.9 MG/DL (ref 1.6–2.6)
MCH RBC QN AUTO: 28.6 PG (ref 27–31)
MCHC RBC AUTO-ENTMCNC: 31.8 G/DL (ref 32–36)
MCV RBC AUTO: 90 FL (ref 82–98)
MONOCYTES # BLD AUTO: 2.1 K/UL (ref 0.3–1)
MONOCYTES NFR BLD: 9 % (ref 4–15)
NEUTROPHILS # BLD AUTO: 19.9 K/UL (ref 1.8–7.7)
NEUTROPHILS NFR BLD: 85.7 % (ref 38–73)
NRBC BLD-RTO: 0 /100 WBC
PHOSPHATE SERPL-MCNC: 2.8 MG/DL (ref 2.7–4.5)
PLATELET # BLD AUTO: 394 K/UL (ref 150–450)
PMV BLD AUTO: 10.4 FL (ref 9.2–12.9)
POCT GLUCOSE: 215 MG/DL (ref 70–110)
POCT GLUCOSE: 224 MG/DL (ref 70–110)
POCT GLUCOSE: 277 MG/DL (ref 70–110)
POCT GLUCOSE: 297 MG/DL (ref 70–110)
POTASSIUM SERPL-SCNC: 4.4 MMOL/L (ref 3.5–5.1)
PROT SERPL-MCNC: 5.2 G/DL (ref 6–8.4)
RBC # BLD AUTO: 2.73 M/UL (ref 4–5.4)
SODIUM SERPL-SCNC: 135 MMOL/L (ref 136–145)
WBC # BLD AUTO: 23.24 K/UL (ref 3.9–12.7)

## 2021-05-30 PROCEDURE — 99232 PR SUBSEQUENT HOSPITAL CARE,LEVL II: ICD-10-PCS | Mod: GC,,, | Performed by: INTERNAL MEDICINE

## 2021-05-30 PROCEDURE — 63600175 PHARM REV CODE 636 W HCPCS: Performed by: NURSE PRACTITIONER

## 2021-05-30 PROCEDURE — 99232 PR SUBSEQUENT HOSPITAL CARE,LEVL II: ICD-10-PCS | Mod: ,,, | Performed by: NURSE PRACTITIONER

## 2021-05-30 PROCEDURE — 63600175 PHARM REV CODE 636 W HCPCS: Performed by: STUDENT IN AN ORGANIZED HEALTH CARE EDUCATION/TRAINING PROGRAM

## 2021-05-30 PROCEDURE — 25000003 PHARM REV CODE 250: Performed by: NURSE PRACTITIONER

## 2021-05-30 PROCEDURE — 85025 COMPLETE CBC W/AUTO DIFF WBC: CPT | Performed by: STUDENT IN AN ORGANIZED HEALTH CARE EDUCATION/TRAINING PROGRAM

## 2021-05-30 PROCEDURE — 84100 ASSAY OF PHOSPHORUS: CPT | Performed by: STUDENT IN AN ORGANIZED HEALTH CARE EDUCATION/TRAINING PROGRAM

## 2021-05-30 PROCEDURE — 99232 SBSQ HOSP IP/OBS MODERATE 35: CPT | Mod: GC,,, | Performed by: INTERNAL MEDICINE

## 2021-05-30 PROCEDURE — 25000003 PHARM REV CODE 250: Performed by: STUDENT IN AN ORGANIZED HEALTH CARE EDUCATION/TRAINING PROGRAM

## 2021-05-30 PROCEDURE — 80053 COMPREHEN METABOLIC PANEL: CPT | Performed by: STUDENT IN AN ORGANIZED HEALTH CARE EDUCATION/TRAINING PROGRAM

## 2021-05-30 PROCEDURE — C9399 UNCLASSIFIED DRUGS OR BIOLOG: HCPCS | Performed by: NURSE PRACTITIONER

## 2021-05-30 PROCEDURE — 25500020 PHARM REV CODE 255: Performed by: SURGERY

## 2021-05-30 PROCEDURE — 63600175 PHARM REV CODE 636 W HCPCS: Performed by: SURGERY

## 2021-05-30 PROCEDURE — 99232 SBSQ HOSP IP/OBS MODERATE 35: CPT | Mod: ,,, | Performed by: NURSE PRACTITIONER

## 2021-05-30 PROCEDURE — 25000003 PHARM REV CODE 250: Performed by: SURGERY

## 2021-05-30 PROCEDURE — 25000242 PHARM REV CODE 250 ALT 637 W/ HCPCS: Performed by: STUDENT IN AN ORGANIZED HEALTH CARE EDUCATION/TRAINING PROGRAM

## 2021-05-30 PROCEDURE — 83735 ASSAY OF MAGNESIUM: CPT | Performed by: STUDENT IN AN ORGANIZED HEALTH CARE EDUCATION/TRAINING PROGRAM

## 2021-05-30 PROCEDURE — 20600001 HC STEP DOWN PRIVATE ROOM

## 2021-05-30 RX ORDER — FENTANYL CITRATE 50 UG/ML
INJECTION, SOLUTION INTRAMUSCULAR; INTRAVENOUS
Status: DISCONTINUED | OUTPATIENT
Start: 2021-05-27 | End: 2021-05-30

## 2021-05-30 RX ORDER — INSULIN ASPART 100 [IU]/ML
3 INJECTION, SOLUTION INTRAVENOUS; SUBCUTANEOUS
Status: DISCONTINUED | OUTPATIENT
Start: 2021-05-30 | End: 2021-05-31

## 2021-05-30 RX ADMIN — INSULIN ASPART 3 UNITS: 100 INJECTION, SOLUTION INTRAVENOUS; SUBCUTANEOUS at 11:05

## 2021-05-30 RX ADMIN — Medication 1 PACKET: at 09:05

## 2021-05-30 RX ADMIN — SODIUM CHLORIDE: 0.9 INJECTION, SOLUTION INTRAVENOUS at 05:05

## 2021-05-30 RX ADMIN — Medication 1 PACKET: at 08:05

## 2021-05-30 RX ADMIN — INSULIN ASPART 2 UNITS: 100 INJECTION, SOLUTION INTRAVENOUS; SUBCUTANEOUS at 04:05

## 2021-05-30 RX ADMIN — INSULIN DETEMIR 8 UNITS: 100 INJECTION, SOLUTION SUBCUTANEOUS at 10:05

## 2021-05-30 RX ADMIN — IOHEXOL 100 ML: 350 INJECTION, SOLUTION INTRAVENOUS at 05:05

## 2021-05-30 RX ADMIN — ATORVASTATIN CALCIUM 80 MG: 20 TABLET, FILM COATED ORAL at 09:05

## 2021-05-30 RX ADMIN — SODIUM CHLORIDE: 0.9 INJECTION, SOLUTION INTRAVENOUS at 11:05

## 2021-05-30 RX ADMIN — OXYCODONE 5 MG: 5 TABLET ORAL at 07:05

## 2021-05-30 RX ADMIN — INSULIN ASPART 3 UNITS: 100 INJECTION, SOLUTION INTRAVENOUS; SUBCUTANEOUS at 07:05

## 2021-05-30 RX ADMIN — MIDODRINE HYDROCHLORIDE 2.5 MG: 2.5 TABLET ORAL at 09:05

## 2021-05-30 RX ADMIN — NYSTATIN: 100000 POWDER TOPICAL at 08:05

## 2021-05-30 RX ADMIN — INSULIN ASPART 3 UNITS: 100 INJECTION, SOLUTION INTRAVENOUS; SUBCUTANEOUS at 04:05

## 2021-05-30 RX ADMIN — POLYETHYLENE GLYCOL 3350 17 G: 17 POWDER, FOR SOLUTION ORAL at 08:05

## 2021-05-30 RX ADMIN — NYSTATIN: 100000 POWDER TOPICAL at 09:05

## 2021-05-30 RX ADMIN — CEFEPIME 2 G: 2 INJECTION, POWDER, FOR SOLUTION INTRAVENOUS at 10:05

## 2021-05-30 RX ADMIN — INSULIN ASPART 1 UNITS: 100 INJECTION, SOLUTION INTRAVENOUS; SUBCUTANEOUS at 09:05

## 2021-05-30 RX ADMIN — CEFEPIME 2 G: 2 INJECTION, POWDER, FOR SOLUTION INTRAVENOUS at 05:05

## 2021-05-30 RX ADMIN — CEFEPIME 2 G: 2 INJECTION, POWDER, FOR SOLUTION INTRAVENOUS at 11:05

## 2021-05-30 RX ADMIN — MELATONIN TAB 3 MG 6 MG: 3 TAB at 09:05

## 2021-05-30 RX ADMIN — HYDROMORPHONE HYDROCHLORIDE 0.5 MG: 1 INJECTION, SOLUTION INTRAMUSCULAR; INTRAVENOUS; SUBCUTANEOUS at 03:05

## 2021-05-30 RX ADMIN — OXYCODONE 5 MG: 5 TABLET ORAL at 09:05

## 2021-05-31 LAB
ALBUMIN SERPL BCP-MCNC: 0.9 G/DL (ref 3.5–5.2)
ALP SERPL-CCNC: 519 U/L (ref 55–135)
ALT SERPL W/O P-5'-P-CCNC: 34 U/L (ref 10–44)
ANION GAP SERPL CALC-SCNC: 8 MMOL/L (ref 8–16)
ANISOCYTOSIS BLD QL SMEAR: SLIGHT
AST SERPL-CCNC: 59 U/L (ref 10–40)
BASOPHILS # BLD AUTO: 0.03 K/UL (ref 0–0.2)
BASOPHILS NFR BLD: 0.1 % (ref 0–1.9)
BILIRUB DIRECT SERPL-MCNC: 0.4 MG/DL (ref 0.1–0.3)
BILIRUB SERPL-MCNC: 0.7 MG/DL (ref 0.1–1)
BUN SERPL-MCNC: 22 MG/DL (ref 6–20)
CALCIUM SERPL-MCNC: 7.6 MG/DL (ref 8.7–10.5)
CHLORIDE SERPL-SCNC: 105 MMOL/L (ref 95–110)
CO2 SERPL-SCNC: 22 MMOL/L (ref 23–29)
CREAT SERPL-MCNC: 0.8 MG/DL (ref 0.5–1.4)
DIFFERENTIAL METHOD: ABNORMAL
EOSINOPHIL # BLD AUTO: 0.1 K/UL (ref 0–0.5)
EOSINOPHIL NFR BLD: 0.7 % (ref 0–8)
ERYTHROCYTE [DISTWIDTH] IN BLOOD BY AUTOMATED COUNT: 17.2 % (ref 11.5–14.5)
EST. GFR  (AFRICAN AMERICAN): >60 ML/MIN/1.73 M^2
EST. GFR  (NON AFRICAN AMERICAN): >60 ML/MIN/1.73 M^2
GLUCOSE SERPL-MCNC: 183 MG/DL (ref 70–110)
HCT VFR BLD AUTO: 26.2 % (ref 37–48.5)
HGB BLD-MCNC: 8.4 G/DL (ref 12–16)
HYPOCHROMIA BLD QL SMEAR: ABNORMAL
IMM GRANULOCYTES # BLD AUTO: 0.16 K/UL (ref 0–0.04)
IMM GRANULOCYTES NFR BLD AUTO: 0.8 % (ref 0–0.5)
LYMPHOCYTES # BLD AUTO: 1 K/UL (ref 1–4.8)
LYMPHOCYTES NFR BLD: 4.7 % (ref 18–48)
MCH RBC QN AUTO: 28.2 PG (ref 27–31)
MCHC RBC AUTO-ENTMCNC: 32.1 G/DL (ref 32–36)
MCV RBC AUTO: 88 FL (ref 82–98)
MONOCYTES # BLD AUTO: 2.2 K/UL (ref 0.3–1)
MONOCYTES NFR BLD: 10.4 % (ref 4–15)
NEUTROPHILS # BLD AUTO: 17.6 K/UL (ref 1.8–7.7)
NEUTROPHILS NFR BLD: 83.3 % (ref 38–73)
NRBC BLD-RTO: 0 /100 WBC
OVALOCYTES BLD QL SMEAR: ABNORMAL
PLATELET # BLD AUTO: 511 K/UL (ref 150–450)
PMV BLD AUTO: 10.2 FL (ref 9.2–12.9)
POCT GLUCOSE: 128 MG/DL (ref 70–110)
POCT GLUCOSE: 151 MG/DL (ref 70–110)
POCT GLUCOSE: 213 MG/DL (ref 70–110)
POCT GLUCOSE: 216 MG/DL (ref 70–110)
POIKILOCYTOSIS BLD QL SMEAR: SLIGHT
POLYCHROMASIA BLD QL SMEAR: ABNORMAL
POTASSIUM SERPL-SCNC: 4.3 MMOL/L (ref 3.5–5.1)
PREALB SERPL-MCNC: 6 MG/DL (ref 20–43)
PROT SERPL-MCNC: 5.5 G/DL (ref 6–8.4)
RBC # BLD AUTO: 2.98 M/UL (ref 4–5.4)
SODIUM SERPL-SCNC: 135 MMOL/L (ref 136–145)
WBC # BLD AUTO: 21.07 K/UL (ref 3.9–12.7)

## 2021-05-31 PROCEDURE — 85025 COMPLETE CBC W/AUTO DIFF WBC: CPT | Performed by: STUDENT IN AN ORGANIZED HEALTH CARE EDUCATION/TRAINING PROGRAM

## 2021-05-31 PROCEDURE — 97110 THERAPEUTIC EXERCISES: CPT

## 2021-05-31 PROCEDURE — 80076 HEPATIC FUNCTION PANEL: CPT | Performed by: STUDENT IN AN ORGANIZED HEALTH CARE EDUCATION/TRAINING PROGRAM

## 2021-05-31 PROCEDURE — 99232 SBSQ HOSP IP/OBS MODERATE 35: CPT | Mod: ,,, | Performed by: NURSE PRACTITIONER

## 2021-05-31 PROCEDURE — 25000003 PHARM REV CODE 250: Performed by: SURGERY

## 2021-05-31 PROCEDURE — 25000003 PHARM REV CODE 250: Performed by: STUDENT IN AN ORGANIZED HEALTH CARE EDUCATION/TRAINING PROGRAM

## 2021-05-31 PROCEDURE — 20600001 HC STEP DOWN PRIVATE ROOM

## 2021-05-31 PROCEDURE — 63600175 PHARM REV CODE 636 W HCPCS: Performed by: SURGERY

## 2021-05-31 PROCEDURE — 25000242 PHARM REV CODE 250 ALT 637 W/ HCPCS: Performed by: STUDENT IN AN ORGANIZED HEALTH CARE EDUCATION/TRAINING PROGRAM

## 2021-05-31 PROCEDURE — 97535 SELF CARE MNGMENT TRAINING: CPT

## 2021-05-31 PROCEDURE — 99232 PR SUBSEQUENT HOSPITAL CARE,LEVL II: ICD-10-PCS | Mod: ,,, | Performed by: NURSE PRACTITIONER

## 2021-05-31 PROCEDURE — 63600175 PHARM REV CODE 636 W HCPCS: Performed by: STUDENT IN AN ORGANIZED HEALTH CARE EDUCATION/TRAINING PROGRAM

## 2021-05-31 PROCEDURE — 97530 THERAPEUTIC ACTIVITIES: CPT

## 2021-05-31 PROCEDURE — 99024 PR POST-OP FOLLOW-UP VISIT: ICD-10-PCS | Mod: POP,,, | Performed by: SURGERY

## 2021-05-31 PROCEDURE — 84134 ASSAY OF PREALBUMIN: CPT | Performed by: STUDENT IN AN ORGANIZED HEALTH CARE EDUCATION/TRAINING PROGRAM

## 2021-05-31 PROCEDURE — 99024 POSTOP FOLLOW-UP VISIT: CPT | Mod: POP,,, | Performed by: SURGERY

## 2021-05-31 PROCEDURE — 80048 BASIC METABOLIC PNL TOTAL CA: CPT | Performed by: STUDENT IN AN ORGANIZED HEALTH CARE EDUCATION/TRAINING PROGRAM

## 2021-05-31 PROCEDURE — 25000003 PHARM REV CODE 250: Performed by: NURSE PRACTITIONER

## 2021-05-31 RX ADMIN — CEFEPIME 2 G: 2 INJECTION, POWDER, FOR SOLUTION INTRAVENOUS at 08:05

## 2021-05-31 RX ADMIN — Medication 1 PACKET: at 12:05

## 2021-05-31 RX ADMIN — HYDROMORPHONE HYDROCHLORIDE 0.5 MG: 1 INJECTION, SOLUTION INTRAMUSCULAR; INTRAVENOUS; SUBCUTANEOUS at 11:05

## 2021-05-31 RX ADMIN — HYDROMORPHONE HYDROCHLORIDE 0.5 MG: 1 INJECTION, SOLUTION INTRAMUSCULAR; INTRAVENOUS; SUBCUTANEOUS at 08:05

## 2021-05-31 RX ADMIN — OXYCODONE 5 MG: 5 TABLET ORAL at 11:05

## 2021-05-31 RX ADMIN — HYDROMORPHONE HYDROCHLORIDE 0.5 MG: 1 INJECTION, SOLUTION INTRAMUSCULAR; INTRAVENOUS; SUBCUTANEOUS at 04:05

## 2021-05-31 RX ADMIN — MIDODRINE HYDROCHLORIDE 2.5 MG: 2.5 TABLET ORAL at 09:05

## 2021-05-31 RX ADMIN — ATORVASTATIN CALCIUM 80 MG: 20 TABLET, FILM COATED ORAL at 08:05

## 2021-05-31 RX ADMIN — INSULIN ASPART 2 UNITS: 100 INJECTION, SOLUTION INTRAVENOUS; SUBCUTANEOUS at 01:05

## 2021-05-31 RX ADMIN — MIDODRINE HYDROCHLORIDE 2.5 MG: 2.5 TABLET ORAL at 08:05

## 2021-05-31 RX ADMIN — OXYCODONE 5 MG: 5 TABLET ORAL at 07:05

## 2021-05-31 RX ADMIN — INSULIN ASPART 2 UNITS: 100 INJECTION, SOLUTION INTRAVENOUS; SUBCUTANEOUS at 09:05

## 2021-05-31 RX ADMIN — INSULIN DETEMIR 8 UNITS: 100 INJECTION, SOLUTION SUBCUTANEOUS at 09:05

## 2021-05-31 RX ADMIN — POLYETHYLENE GLYCOL 3350 17 G: 17 POWDER, FOR SOLUTION ORAL at 09:05

## 2021-05-31 RX ADMIN — ONDANSETRON 4 MG: 2 INJECTION INTRAMUSCULAR; INTRAVENOUS at 09:05

## 2021-05-31 RX ADMIN — NYSTATIN: 100000 POWDER TOPICAL at 09:05

## 2021-05-31 RX ADMIN — INSULIN ASPART 3 UNITS: 100 INJECTION, SOLUTION INTRAVENOUS; SUBCUTANEOUS at 09:05

## 2021-05-31 RX ADMIN — HYDROMORPHONE HYDROCHLORIDE 0.5 MG: 1 INJECTION, SOLUTION INTRAMUSCULAR; INTRAVENOUS; SUBCUTANEOUS at 02:05

## 2021-05-31 RX ADMIN — CEFEPIME 2 G: 2 INJECTION, POWDER, FOR SOLUTION INTRAVENOUS at 04:05

## 2021-05-31 RX ADMIN — NYSTATIN: 100000 POWDER TOPICAL at 08:05

## 2021-05-31 RX ADMIN — SITAGLIPTIN 100 MG: 100 TABLET, FILM COATED ORAL at 12:05

## 2021-05-31 RX ADMIN — SODIUM CHLORIDE: 0.9 INJECTION, SOLUTION INTRAVENOUS at 01:05

## 2021-05-31 RX ADMIN — CEFEPIME 2 G: 2 INJECTION, POWDER, FOR SOLUTION INTRAVENOUS at 01:05

## 2021-06-01 PROBLEM — T82.7XXA VASCULAR GRAFT INFECTION: Status: ACTIVE | Noted: 2021-06-01

## 2021-06-01 LAB
BACTERIA SPEC ANAEROBE CULT: ABNORMAL
CREAT UR-MCNC: 40 MG/DL (ref 15–325)
ERYTHROCYTE [DISTWIDTH] IN BLOOD BY AUTOMATED COUNT: 17.2 % (ref 11.5–14.5)
HCT VFR BLD AUTO: 26.5 % (ref 37–48.5)
HGB BLD-MCNC: 8.5 G/DL (ref 12–16)
MCH RBC QN AUTO: 28.8 PG (ref 27–31)
MCHC RBC AUTO-ENTMCNC: 32.1 G/DL (ref 32–36)
MCV RBC AUTO: 90 FL (ref 82–98)
PLATELET # BLD AUTO: 580 K/UL (ref 150–450)
PMV BLD AUTO: 10.1 FL (ref 9.2–12.9)
POCT GLUCOSE: 134 MG/DL (ref 70–110)
POCT GLUCOSE: 146 MG/DL (ref 70–110)
POCT GLUCOSE: 161 MG/DL (ref 70–110)
POCT GLUCOSE: 188 MG/DL (ref 70–110)
POCT GLUCOSE: 93 MG/DL (ref 70–110)
PROT UR-MCNC: 492 MG/DL (ref 0–15)
PROT/CREAT UR: 12.3 MG/G{CREAT} (ref 0–0.2)
RBC # BLD AUTO: 2.95 M/UL (ref 4–5.4)
WBC # BLD AUTO: 22.7 K/UL (ref 3.9–12.7)

## 2021-06-01 PROCEDURE — 99233 PR SUBSEQUENT HOSPITAL CARE,LEVL III: ICD-10-PCS | Mod: GC,,, | Performed by: INTERNAL MEDICINE

## 2021-06-01 PROCEDURE — 25000003 PHARM REV CODE 250: Performed by: STUDENT IN AN ORGANIZED HEALTH CARE EDUCATION/TRAINING PROGRAM

## 2021-06-01 PROCEDURE — 63600175 PHARM REV CODE 636 W HCPCS: Performed by: STUDENT IN AN ORGANIZED HEALTH CARE EDUCATION/TRAINING PROGRAM

## 2021-06-01 PROCEDURE — 63600175 PHARM REV CODE 636 W HCPCS: Mod: JG | Performed by: STUDENT IN AN ORGANIZED HEALTH CARE EDUCATION/TRAINING PROGRAM

## 2021-06-01 PROCEDURE — 25000003 PHARM REV CODE 250: Performed by: NURSE PRACTITIONER

## 2021-06-01 PROCEDURE — 97110 THERAPEUTIC EXERCISES: CPT

## 2021-06-01 PROCEDURE — 63600175 PHARM REV CODE 636 W HCPCS: Performed by: SURGERY

## 2021-06-01 PROCEDURE — 99233 SBSQ HOSP IP/OBS HIGH 50: CPT | Mod: GC,,, | Performed by: INTERNAL MEDICINE

## 2021-06-01 PROCEDURE — 85027 COMPLETE CBC AUTOMATED: CPT | Performed by: SURGERY

## 2021-06-01 PROCEDURE — 97542 WHEELCHAIR MNGMENT TRAINING: CPT

## 2021-06-01 PROCEDURE — 20600001 HC STEP DOWN PRIVATE ROOM

## 2021-06-01 PROCEDURE — 25000003 PHARM REV CODE 250: Performed by: SURGERY

## 2021-06-01 PROCEDURE — 97530 THERAPEUTIC ACTIVITIES: CPT

## 2021-06-01 PROCEDURE — 84156 ASSAY OF PROTEIN URINE: CPT | Performed by: STUDENT IN AN ORGANIZED HEALTH CARE EDUCATION/TRAINING PROGRAM

## 2021-06-01 RX ORDER — HYDROMORPHONE HYDROCHLORIDE 1 MG/ML
1 INJECTION, SOLUTION INTRAMUSCULAR; INTRAVENOUS; SUBCUTANEOUS ONCE
Status: COMPLETED | OUTPATIENT
Start: 2021-06-01 | End: 2021-06-01

## 2021-06-01 RX ORDER — OXYCODONE HYDROCHLORIDE 10 MG/1
10 TABLET ORAL EVERY 4 HOURS PRN
Status: DISCONTINUED | OUTPATIENT
Start: 2021-06-01 | End: 2021-06-02

## 2021-06-01 RX ORDER — DRONABINOL 2.5 MG/1
5 CAPSULE ORAL 2 TIMES DAILY
Status: DISCONTINUED | OUTPATIENT
Start: 2021-06-01 | End: 2021-06-03

## 2021-06-01 RX ORDER — MIRTAZAPINE 15 MG/1
15 TABLET, FILM COATED ORAL NIGHTLY
Status: DISCONTINUED | OUTPATIENT
Start: 2021-06-01 | End: 2021-06-03

## 2021-06-01 RX ORDER — OXYCODONE HYDROCHLORIDE 5 MG/1
5 TABLET ORAL EVERY 4 HOURS PRN
Status: DISCONTINUED | OUTPATIENT
Start: 2021-06-01 | End: 2021-06-08 | Stop reason: HOSPADM

## 2021-06-01 RX ADMIN — CEFEPIME 2 G: 2 INJECTION, POWDER, FOR SOLUTION INTRAVENOUS at 03:06

## 2021-06-01 RX ADMIN — HYDROMORPHONE HYDROCHLORIDE 1 MG: 1 INJECTION, SOLUTION INTRAMUSCULAR; INTRAVENOUS; SUBCUTANEOUS at 04:06

## 2021-06-01 RX ADMIN — ATORVASTATIN CALCIUM 80 MG: 20 TABLET, FILM COATED ORAL at 08:06

## 2021-06-01 RX ADMIN — SITAGLIPTIN 100 MG: 100 TABLET, FILM COATED ORAL at 09:06

## 2021-06-01 RX ADMIN — MIRTAZAPINE 15 MG: 15 TABLET, FILM COATED ORAL at 08:06

## 2021-06-01 RX ADMIN — OXYCODONE 5 MG: 5 TABLET ORAL at 01:06

## 2021-06-01 RX ADMIN — DRONABINOL 5 MG: 2.5 CAPSULE ORAL at 08:06

## 2021-06-01 RX ADMIN — NYSTATIN: 100000 POWDER TOPICAL at 09:06

## 2021-06-01 RX ADMIN — MELATONIN TAB 3 MG 6 MG: 3 TAB at 08:06

## 2021-06-01 RX ADMIN — CEFTAZIDIME, AVIBACTAM 2.5 G: 2; .5 POWDER, FOR SOLUTION INTRAVENOUS at 02:06

## 2021-06-01 RX ADMIN — HYDROMORPHONE HYDROCHLORIDE 0.5 MG: 1 INJECTION, SOLUTION INTRAMUSCULAR; INTRAVENOUS; SUBCUTANEOUS at 02:06

## 2021-06-01 RX ADMIN — HYDROMORPHONE HYDROCHLORIDE 0.5 MG: 1 INJECTION, SOLUTION INTRAMUSCULAR; INTRAVENOUS; SUBCUTANEOUS at 09:06

## 2021-06-01 RX ADMIN — SODIUM CHLORIDE: 0.9 INJECTION, SOLUTION INTRAVENOUS at 10:06

## 2021-06-01 RX ADMIN — POLYETHYLENE GLYCOL 3350 17 G: 17 POWDER, FOR SOLUTION ORAL at 10:06

## 2021-06-01 RX ADMIN — OXYCODONE HYDROCHLORIDE 10 MG: 10 TABLET ORAL at 08:06

## 2021-06-01 RX ADMIN — MELATONIN TAB 3 MG 6 MG: 3 TAB at 01:06

## 2021-06-01 RX ADMIN — OXYCODONE 5 MG: 5 TABLET ORAL at 11:06

## 2021-06-01 RX ADMIN — HYDROMORPHONE HYDROCHLORIDE 0.5 MG: 1 INJECTION, SOLUTION INTRAMUSCULAR; INTRAVENOUS; SUBCUTANEOUS at 01:06

## 2021-06-01 RX ADMIN — CEFTAZIDIME, AVIBACTAM 2.5 G: 2; .5 POWDER, FOR SOLUTION INTRAVENOUS at 11:06

## 2021-06-01 RX ADMIN — CEFEPIME 2 G: 2 INJECTION, POWDER, FOR SOLUTION INTRAVENOUS at 11:06

## 2021-06-01 RX ADMIN — DRONABINOL 5 MG: 2.5 CAPSULE ORAL at 09:06

## 2021-06-01 RX ADMIN — OXYCODONE 5 MG: 5 TABLET ORAL at 04:06

## 2021-06-02 LAB
POCT GLUCOSE: 117 MG/DL (ref 70–110)
POCT GLUCOSE: 127 MG/DL (ref 70–110)
POCT GLUCOSE: 139 MG/DL (ref 70–110)
POCT GLUCOSE: 99 MG/DL (ref 70–110)

## 2021-06-02 PROCEDURE — 63600175 PHARM REV CODE 636 W HCPCS: Performed by: STUDENT IN AN ORGANIZED HEALTH CARE EDUCATION/TRAINING PROGRAM

## 2021-06-02 PROCEDURE — 25000003 PHARM REV CODE 250: Performed by: SURGERY

## 2021-06-02 PROCEDURE — 25000003 PHARM REV CODE 250: Performed by: NURSE PRACTITIONER

## 2021-06-02 PROCEDURE — 25000003 PHARM REV CODE 250: Performed by: STUDENT IN AN ORGANIZED HEALTH CARE EDUCATION/TRAINING PROGRAM

## 2021-06-02 PROCEDURE — 99233 SBSQ HOSP IP/OBS HIGH 50: CPT | Mod: GC,,, | Performed by: INTERNAL MEDICINE

## 2021-06-02 PROCEDURE — 25500020 PHARM REV CODE 255: Performed by: SURGERY

## 2021-06-02 PROCEDURE — 99232 PR SUBSEQUENT HOSPITAL CARE,LEVL II: ICD-10-PCS | Mod: ,,, | Performed by: NURSE PRACTITIONER

## 2021-06-02 PROCEDURE — 20600001 HC STEP DOWN PRIVATE ROOM

## 2021-06-02 PROCEDURE — 99233 PR SUBSEQUENT HOSPITAL CARE,LEVL III: ICD-10-PCS | Mod: GC,,, | Performed by: INTERNAL MEDICINE

## 2021-06-02 PROCEDURE — A9585 GADOBUTROL INJECTION: HCPCS | Performed by: SURGERY

## 2021-06-02 PROCEDURE — 63600175 PHARM REV CODE 636 W HCPCS: Mod: JG | Performed by: STUDENT IN AN ORGANIZED HEALTH CARE EDUCATION/TRAINING PROGRAM

## 2021-06-02 PROCEDURE — 99232 SBSQ HOSP IP/OBS MODERATE 35: CPT | Mod: ,,, | Performed by: NURSE PRACTITIONER

## 2021-06-02 PROCEDURE — 25000003 PHARM REV CODE 250: Performed by: INTERNAL MEDICINE

## 2021-06-02 RX ORDER — METRONIDAZOLE 500 MG/1
500 TABLET ORAL EVERY 8 HOURS
Status: DISCONTINUED | OUTPATIENT
Start: 2021-06-02 | End: 2021-06-08 | Stop reason: HOSPADM

## 2021-06-02 RX ORDER — GADOBUTROL 604.72 MG/ML
10 INJECTION INTRAVENOUS
Status: COMPLETED | OUTPATIENT
Start: 2021-06-02 | End: 2021-06-02

## 2021-06-02 RX ADMIN — MIRTAZAPINE 15 MG: 15 TABLET, FILM COATED ORAL at 11:06

## 2021-06-02 RX ADMIN — CEFTAZIDIME, AVIBACTAM 2.5 G: 2; .5 POWDER, FOR SOLUTION INTRAVENOUS at 11:06

## 2021-06-02 RX ADMIN — ATORVASTATIN CALCIUM 80 MG: 20 TABLET, FILM COATED ORAL at 11:06

## 2021-06-02 RX ADMIN — CEFTAZIDIME, AVIBACTAM 2.5 G: 2; .5 POWDER, FOR SOLUTION INTRAVENOUS at 06:06

## 2021-06-02 RX ADMIN — OXYCODONE 5 MG: 5 TABLET ORAL at 06:06

## 2021-06-02 RX ADMIN — OXYCODONE HYDROCHLORIDE 10 MG: 10 TABLET ORAL at 12:06

## 2021-06-02 RX ADMIN — NYSTATIN: 100000 POWDER TOPICAL at 11:06

## 2021-06-02 RX ADMIN — DRONABINOL 5 MG: 2.5 CAPSULE ORAL at 08:06

## 2021-06-02 RX ADMIN — METRONIDAZOLE 500 MG: 500 TABLET ORAL at 11:06

## 2021-06-02 RX ADMIN — MELATONIN TAB 3 MG 6 MG: 3 TAB at 11:06

## 2021-06-02 RX ADMIN — OXYCODONE 5 MG: 5 TABLET ORAL at 11:06

## 2021-06-02 RX ADMIN — OXYCODONE HYDROCHLORIDE 10 MG: 10 TABLET ORAL at 07:06

## 2021-06-02 RX ADMIN — SITAGLIPTIN 100 MG: 100 TABLET, FILM COATED ORAL at 08:06

## 2021-06-02 RX ADMIN — GADOBUTROL 10 ML: 604.72 INJECTION INTRAVENOUS at 10:06

## 2021-06-02 RX ADMIN — POLYETHYLENE GLYCOL 3350 17 G: 17 POWDER, FOR SOLUTION ORAL at 08:06

## 2021-06-02 RX ADMIN — NYSTATIN: 100000 POWDER TOPICAL at 08:06

## 2021-06-02 RX ADMIN — DRONABINOL 5 MG: 2.5 CAPSULE ORAL at 11:06

## 2021-06-02 RX ADMIN — CEFTAZIDIME, AVIBACTAM 2.5 G: 2; .5 POWDER, FOR SOLUTION INTRAVENOUS at 03:06

## 2021-06-03 LAB
ANION GAP SERPL CALC-SCNC: 8 MMOL/L (ref 8–16)
BASOPHILS # BLD AUTO: 0.07 K/UL (ref 0–0.2)
BASOPHILS NFR BLD: 0.4 % (ref 0–1.9)
BUN SERPL-MCNC: 18 MG/DL (ref 6–20)
CALCIUM SERPL-MCNC: 7.9 MG/DL (ref 8.7–10.5)
CHLORIDE SERPL-SCNC: 109 MMOL/L (ref 95–110)
CO2 SERPL-SCNC: 18 MMOL/L (ref 23–29)
CREAT SERPL-MCNC: 0.8 MG/DL (ref 0.5–1.4)
DIFFERENTIAL METHOD: ABNORMAL
EOSINOPHIL # BLD AUTO: 0.1 K/UL (ref 0–0.5)
EOSINOPHIL NFR BLD: 0.7 % (ref 0–8)
ERYTHROCYTE [DISTWIDTH] IN BLOOD BY AUTOMATED COUNT: 17.3 % (ref 11.5–14.5)
EST. GFR  (AFRICAN AMERICAN): >60 ML/MIN/1.73 M^2
EST. GFR  (NON AFRICAN AMERICAN): >60 ML/MIN/1.73 M^2
GLUCOSE SERPL-MCNC: 132 MG/DL (ref 70–110)
HCT VFR BLD AUTO: 26.3 % (ref 37–48.5)
HGB BLD-MCNC: 8.1 G/DL (ref 12–16)
IMM GRANULOCYTES # BLD AUTO: 0.2 K/UL (ref 0–0.04)
IMM GRANULOCYTES NFR BLD AUTO: 1.1 % (ref 0–0.5)
LYMPHOCYTES # BLD AUTO: 0.9 K/UL (ref 1–4.8)
LYMPHOCYTES NFR BLD: 4.8 % (ref 18–48)
MCH RBC QN AUTO: 28.4 PG (ref 27–31)
MCHC RBC AUTO-ENTMCNC: 30.8 G/DL (ref 32–36)
MCV RBC AUTO: 92 FL (ref 82–98)
MONOCYTES # BLD AUTO: 1.4 K/UL (ref 0.3–1)
MONOCYTES NFR BLD: 8 % (ref 4–15)
NEUTROPHILS # BLD AUTO: 15.3 K/UL (ref 1.8–7.7)
NEUTROPHILS NFR BLD: 85 % (ref 38–73)
NRBC BLD-RTO: 0 /100 WBC
PLATELET # BLD AUTO: 650 K/UL (ref 150–450)
PMV BLD AUTO: 10 FL (ref 9.2–12.9)
POCT GLUCOSE: 130 MG/DL (ref 70–110)
POCT GLUCOSE: 153 MG/DL (ref 70–110)
POCT GLUCOSE: 162 MG/DL (ref 70–110)
POTASSIUM SERPL-SCNC: 4.4 MMOL/L (ref 3.5–5.1)
RBC # BLD AUTO: 2.85 M/UL (ref 4–5.4)
SODIUM SERPL-SCNC: 135 MMOL/L (ref 136–145)
WBC # BLD AUTO: 18.04 K/UL (ref 3.9–12.7)

## 2021-06-03 PROCEDURE — 99024 PR POST-OP FOLLOW-UP VISIT: ICD-10-PCS | Mod: POP,,, | Performed by: SURGERY

## 2021-06-03 PROCEDURE — 25000003 PHARM REV CODE 250: Performed by: STUDENT IN AN ORGANIZED HEALTH CARE EDUCATION/TRAINING PROGRAM

## 2021-06-03 PROCEDURE — 63600175 PHARM REV CODE 636 W HCPCS: Mod: JG | Performed by: STUDENT IN AN ORGANIZED HEALTH CARE EDUCATION/TRAINING PROGRAM

## 2021-06-03 PROCEDURE — 25000003 PHARM REV CODE 250: Performed by: INTERNAL MEDICINE

## 2021-06-03 PROCEDURE — 97542 WHEELCHAIR MNGMENT TRAINING: CPT

## 2021-06-03 PROCEDURE — 97110 THERAPEUTIC EXERCISES: CPT

## 2021-06-03 PROCEDURE — 97530 THERAPEUTIC ACTIVITIES: CPT

## 2021-06-03 PROCEDURE — 25000003 PHARM REV CODE 250: Performed by: NURSE PRACTITIONER

## 2021-06-03 PROCEDURE — C9399 UNCLASSIFIED DRUGS OR BIOLOG: HCPCS | Performed by: NURSE PRACTITIONER

## 2021-06-03 PROCEDURE — 25000003 PHARM REV CODE 250: Performed by: SURGERY

## 2021-06-03 PROCEDURE — 20600001 HC STEP DOWN PRIVATE ROOM

## 2021-06-03 PROCEDURE — 85025 COMPLETE CBC W/AUTO DIFF WBC: CPT | Performed by: STUDENT IN AN ORGANIZED HEALTH CARE EDUCATION/TRAINING PROGRAM

## 2021-06-03 PROCEDURE — 99024 POSTOP FOLLOW-UP VISIT: CPT | Mod: POP,,, | Performed by: SURGERY

## 2021-06-03 PROCEDURE — 94799 UNLISTED PULMONARY SVC/PX: CPT

## 2021-06-03 PROCEDURE — 80048 BASIC METABOLIC PNL TOTAL CA: CPT | Performed by: STUDENT IN AN ORGANIZED HEALTH CARE EDUCATION/TRAINING PROGRAM

## 2021-06-03 PROCEDURE — 94761 N-INVAS EAR/PLS OXIMETRY MLT: CPT

## 2021-06-03 RX ORDER — MEGESTROL ACETATE 20 MG/1
20 TABLET ORAL DAILY
Status: DISCONTINUED | OUTPATIENT
Start: 2021-06-03 | End: 2021-06-08 | Stop reason: HOSPADM

## 2021-06-03 RX ORDER — OXYCODONE HYDROCHLORIDE 10 MG/1
10 TABLET ORAL ONCE
Status: COMPLETED | OUTPATIENT
Start: 2021-06-03 | End: 2021-06-03

## 2021-06-03 RX ADMIN — METRONIDAZOLE 500 MG: 500 TABLET ORAL at 04:06

## 2021-06-03 RX ADMIN — METRONIDAZOLE 500 MG: 500 TABLET ORAL at 10:06

## 2021-06-03 RX ADMIN — OXYCODONE 5 MG: 5 TABLET ORAL at 06:06

## 2021-06-03 RX ADMIN — MEGESTROL ACETATE 20 MG: 20 TABLET ORAL at 09:06

## 2021-06-03 RX ADMIN — CEFTAZIDIME, AVIBACTAM 2.5 G: 2; .5 POWDER, FOR SOLUTION INTRAVENOUS at 04:06

## 2021-06-03 RX ADMIN — METRONIDAZOLE 500 MG: 500 TABLET ORAL at 05:06

## 2021-06-03 RX ADMIN — MELATONIN TAB 3 MG 6 MG: 3 TAB at 10:06

## 2021-06-03 RX ADMIN — NYSTATIN: 100000 POWDER TOPICAL at 09:06

## 2021-06-03 RX ADMIN — OXYCODONE 5 MG: 5 TABLET ORAL at 10:06

## 2021-06-03 RX ADMIN — OXYCODONE 5 MG: 5 TABLET ORAL at 05:06

## 2021-06-03 RX ADMIN — ATORVASTATIN CALCIUM 80 MG: 20 TABLET, FILM COATED ORAL at 10:06

## 2021-06-03 RX ADMIN — INSULIN DETEMIR 8 UNITS: 100 INJECTION, SOLUTION SUBCUTANEOUS at 09:06

## 2021-06-03 RX ADMIN — CEFTAZIDIME, AVIBACTAM 2.5 G: 2; .5 POWDER, FOR SOLUTION INTRAVENOUS at 11:06

## 2021-06-03 RX ADMIN — POLYETHYLENE GLYCOL 3350 17 G: 17 POWDER, FOR SOLUTION ORAL at 09:06

## 2021-06-03 RX ADMIN — OXYCODONE HYDROCHLORIDE 10 MG: 10 TABLET ORAL at 11:06

## 2021-06-03 RX ADMIN — NYSTATIN: 100000 POWDER TOPICAL at 10:06

## 2021-06-03 RX ADMIN — SITAGLIPTIN 100 MG: 100 TABLET, FILM COATED ORAL at 09:06

## 2021-06-04 LAB
ALBUMIN SERPL BCP-MCNC: 0.9 G/DL (ref 3.5–5.2)
ALP SERPL-CCNC: 364 U/L (ref 55–135)
ALT SERPL W/O P-5'-P-CCNC: 23 U/L (ref 10–44)
ANION GAP SERPL CALC-SCNC: 9 MMOL/L (ref 8–16)
AST SERPL-CCNC: 41 U/L (ref 10–40)
BACTERIA SPEC AEROBE CULT: ABNORMAL
BACTERIA SPEC AEROBE CULT: ABNORMAL
BASOPHILS # BLD AUTO: 0.08 K/UL (ref 0–0.2)
BASOPHILS NFR BLD: 0.5 % (ref 0–1.9)
BILIRUB SERPL-MCNC: 0.5 MG/DL (ref 0.1–1)
BUN SERPL-MCNC: 19 MG/DL (ref 6–20)
CALCIUM SERPL-MCNC: 8 MG/DL (ref 8.7–10.5)
CHLORIDE SERPL-SCNC: 109 MMOL/L (ref 95–110)
CO2 SERPL-SCNC: 18 MMOL/L (ref 23–29)
CREAT SERPL-MCNC: 1 MG/DL (ref 0.5–1.4)
DIFFERENTIAL METHOD: ABNORMAL
EOSINOPHIL # BLD AUTO: 0.2 K/UL (ref 0–0.5)
EOSINOPHIL NFR BLD: 1.1 % (ref 0–8)
ERYTHROCYTE [DISTWIDTH] IN BLOOD BY AUTOMATED COUNT: 17.9 % (ref 11.5–14.5)
EST. GFR  (AFRICAN AMERICAN): >60 ML/MIN/1.73 M^2
EST. GFR  (NON AFRICAN AMERICAN): >60 ML/MIN/1.73 M^2
GLUCOSE SERPL-MCNC: 168 MG/DL (ref 70–110)
HCT VFR BLD AUTO: 27.5 % (ref 37–48.5)
HGB BLD-MCNC: 8.5 G/DL (ref 12–16)
IMM GRANULOCYTES # BLD AUTO: 0.17 K/UL (ref 0–0.04)
IMM GRANULOCYTES NFR BLD AUTO: 1.1 % (ref 0–0.5)
LYMPHOCYTES # BLD AUTO: 0.9 K/UL (ref 1–4.8)
LYMPHOCYTES NFR BLD: 5.5 % (ref 18–48)
MCH RBC QN AUTO: 28.5 PG (ref 27–31)
MCHC RBC AUTO-ENTMCNC: 30.9 G/DL (ref 32–36)
MCV RBC AUTO: 92 FL (ref 82–98)
MONOCYTES # BLD AUTO: 1.3 K/UL (ref 0.3–1)
MONOCYTES NFR BLD: 8.3 % (ref 4–15)
NEUTROPHILS # BLD AUTO: 13.5 K/UL (ref 1.8–7.7)
NEUTROPHILS NFR BLD: 83.5 % (ref 38–73)
NRBC BLD-RTO: 0 /100 WBC
PLATELET # BLD AUTO: 644 K/UL (ref 150–450)
PMV BLD AUTO: 9.8 FL (ref 9.2–12.9)
POCT GLUCOSE: 174 MG/DL (ref 70–110)
POCT GLUCOSE: 222 MG/DL (ref 70–110)
POCT GLUCOSE: 231 MG/DL (ref 70–110)
POTASSIUM SERPL-SCNC: 4.3 MMOL/L (ref 3.5–5.1)
PROT SERPL-MCNC: 6.2 G/DL (ref 6–8.4)
RBC # BLD AUTO: 2.98 M/UL (ref 4–5.4)
SODIUM SERPL-SCNC: 136 MMOL/L (ref 136–145)
WBC # BLD AUTO: 16.12 K/UL (ref 3.9–12.7)

## 2021-06-04 PROCEDURE — 99232 SBSQ HOSP IP/OBS MODERATE 35: CPT | Mod: ,,, | Performed by: NURSE PRACTITIONER

## 2021-06-04 PROCEDURE — 63600175 PHARM REV CODE 636 W HCPCS: Performed by: NURSE PRACTITIONER

## 2021-06-04 PROCEDURE — 97530 THERAPEUTIC ACTIVITIES: CPT

## 2021-06-04 PROCEDURE — 99233 SBSQ HOSP IP/OBS HIGH 50: CPT | Mod: ,,, | Performed by: PSYCHIATRY & NEUROLOGY

## 2021-06-04 PROCEDURE — 80053 COMPREHEN METABOLIC PANEL: CPT | Performed by: SURGERY

## 2021-06-04 PROCEDURE — 97110 THERAPEUTIC EXERCISES: CPT

## 2021-06-04 PROCEDURE — 25000003 PHARM REV CODE 250: Performed by: STUDENT IN AN ORGANIZED HEALTH CARE EDUCATION/TRAINING PROGRAM

## 2021-06-04 PROCEDURE — 25000003 PHARM REV CODE 250: Performed by: SURGERY

## 2021-06-04 PROCEDURE — 25000003 PHARM REV CODE 250: Performed by: NURSE PRACTITIONER

## 2021-06-04 PROCEDURE — 99024 POSTOP FOLLOW-UP VISIT: CPT | Mod: POP,,, | Performed by: SURGERY

## 2021-06-04 PROCEDURE — 63600175 PHARM REV CODE 636 W HCPCS: Mod: JG | Performed by: STUDENT IN AN ORGANIZED HEALTH CARE EDUCATION/TRAINING PROGRAM

## 2021-06-04 PROCEDURE — 20600001 HC STEP DOWN PRIVATE ROOM

## 2021-06-04 PROCEDURE — 85025 COMPLETE CBC W/AUTO DIFF WBC: CPT | Performed by: SURGERY

## 2021-06-04 PROCEDURE — 97542 WHEELCHAIR MNGMENT TRAINING: CPT

## 2021-06-04 PROCEDURE — 25000003 PHARM REV CODE 250: Performed by: INTERNAL MEDICINE

## 2021-06-04 PROCEDURE — 99233 PR SUBSEQUENT HOSPITAL CARE,LEVL III: ICD-10-PCS | Mod: ,,, | Performed by: PSYCHIATRY & NEUROLOGY

## 2021-06-04 PROCEDURE — 99232 PR SUBSEQUENT HOSPITAL CARE,LEVL II: ICD-10-PCS | Mod: ,,, | Performed by: NURSE PRACTITIONER

## 2021-06-04 PROCEDURE — 99024 PR POST-OP FOLLOW-UP VISIT: ICD-10-PCS | Mod: POP,,, | Performed by: SURGERY

## 2021-06-04 RX ORDER — MIRTAZAPINE 7.5 MG/1
7.5 TABLET, FILM COATED ORAL NIGHTLY
Status: DISCONTINUED | OUTPATIENT
Start: 2021-06-04 | End: 2021-06-08 | Stop reason: HOSPADM

## 2021-06-04 RX ORDER — ESCITALOPRAM OXALATE 10 MG/1
10 TABLET ORAL DAILY
Status: DISCONTINUED | OUTPATIENT
Start: 2021-06-04 | End: 2021-06-08 | Stop reason: HOSPADM

## 2021-06-04 RX ADMIN — OXYCODONE 5 MG: 5 TABLET ORAL at 05:06

## 2021-06-04 RX ADMIN — ALTEPLASE 2 MG: 2.2 INJECTION, POWDER, LYOPHILIZED, FOR SOLUTION INTRAVENOUS at 03:06

## 2021-06-04 RX ADMIN — MIRTAZAPINE 7.5 MG: 7.5 TABLET ORAL at 11:06

## 2021-06-04 RX ADMIN — CEFTAZIDIME, AVIBACTAM 2.5 G: 2; .5 POWDER, FOR SOLUTION INTRAVENOUS at 11:06

## 2021-06-04 RX ADMIN — METRONIDAZOLE 500 MG: 500 TABLET ORAL at 11:06

## 2021-06-04 RX ADMIN — ESCITALOPRAM OXALATE 10 MG: 10 TABLET ORAL at 09:06

## 2021-06-04 RX ADMIN — NYSTATIN: 100000 POWDER TOPICAL at 09:06

## 2021-06-04 RX ADMIN — OXYCODONE 5 MG: 5 TABLET ORAL at 03:06

## 2021-06-04 RX ADMIN — CEFTAZIDIME, AVIBACTAM 2.5 G: 2; .5 POWDER, FOR SOLUTION INTRAVENOUS at 02:06

## 2021-06-04 RX ADMIN — METRONIDAZOLE 500 MG: 500 TABLET ORAL at 05:06

## 2021-06-04 RX ADMIN — OXYCODONE 5 MG: 5 TABLET ORAL at 10:06

## 2021-06-04 RX ADMIN — ATORVASTATIN CALCIUM 80 MG: 20 TABLET, FILM COATED ORAL at 11:06

## 2021-06-04 RX ADMIN — SITAGLIPTIN 100 MG: 100 TABLET, FILM COATED ORAL at 09:06

## 2021-06-04 RX ADMIN — CEFTAZIDIME, AVIBACTAM 2.5 G: 2; .5 POWDER, FOR SOLUTION INTRAVENOUS at 06:06

## 2021-06-04 RX ADMIN — MEGESTROL ACETATE 20 MG: 20 TABLET ORAL at 09:06

## 2021-06-04 RX ADMIN — POLYETHYLENE GLYCOL 3350 17 G: 17 POWDER, FOR SOLUTION ORAL at 09:06

## 2021-06-04 RX ADMIN — METRONIDAZOLE 500 MG: 500 TABLET ORAL at 03:06

## 2021-06-04 RX ADMIN — OXYCODONE 5 MG: 5 TABLET ORAL at 09:06

## 2021-06-04 RX ADMIN — INSULIN DETEMIR 8 UNITS: 100 INJECTION, SOLUTION SUBCUTANEOUS at 09:06

## 2021-06-04 RX ADMIN — INSULIN ASPART 2 UNITS: 100 INJECTION, SOLUTION INTRAVENOUS; SUBCUTANEOUS at 11:06

## 2021-06-05 LAB
ALBUMIN SERPL BCP-MCNC: 0.8 G/DL (ref 3.5–5.2)
ALP SERPL-CCNC: 319 U/L (ref 55–135)
ALT SERPL W/O P-5'-P-CCNC: 20 U/L (ref 10–44)
ANION GAP SERPL CALC-SCNC: 6 MMOL/L (ref 8–16)
AST SERPL-CCNC: 38 U/L (ref 10–40)
BASOPHILS # BLD AUTO: 0.06 K/UL (ref 0–0.2)
BASOPHILS NFR BLD: 0.4 % (ref 0–1.9)
BILIRUB SERPL-MCNC: 0.4 MG/DL (ref 0.1–1)
BUN SERPL-MCNC: 17 MG/DL (ref 6–20)
CALCIUM SERPL-MCNC: 7.9 MG/DL (ref 8.7–10.5)
CHLORIDE SERPL-SCNC: 111 MMOL/L (ref 95–110)
CO2 SERPL-SCNC: 19 MMOL/L (ref 23–29)
CREAT SERPL-MCNC: 0.8 MG/DL (ref 0.5–1.4)
DIFFERENTIAL METHOD: ABNORMAL
EOSINOPHIL # BLD AUTO: 0.2 K/UL (ref 0–0.5)
EOSINOPHIL NFR BLD: 1.3 % (ref 0–8)
ERYTHROCYTE [DISTWIDTH] IN BLOOD BY AUTOMATED COUNT: 18.2 % (ref 11.5–14.5)
EST. GFR  (AFRICAN AMERICAN): >60 ML/MIN/1.73 M^2
EST. GFR  (NON AFRICAN AMERICAN): >60 ML/MIN/1.73 M^2
GLUCOSE SERPL-MCNC: 126 MG/DL (ref 70–110)
HCT VFR BLD AUTO: 26.5 % (ref 37–48.5)
HGB BLD-MCNC: 8.2 G/DL (ref 12–16)
IMM GRANULOCYTES # BLD AUTO: 0.15 K/UL (ref 0–0.04)
IMM GRANULOCYTES NFR BLD AUTO: 1.1 % (ref 0–0.5)
LYMPHOCYTES # BLD AUTO: 0.8 K/UL (ref 1–4.8)
LYMPHOCYTES NFR BLD: 5.7 % (ref 18–48)
MCH RBC QN AUTO: 28.1 PG (ref 27–31)
MCHC RBC AUTO-ENTMCNC: 30.9 G/DL (ref 32–36)
MCV RBC AUTO: 91 FL (ref 82–98)
MONOCYTES # BLD AUTO: 1.3 K/UL (ref 0.3–1)
MONOCYTES NFR BLD: 9.7 % (ref 4–15)
NEUTROPHILS # BLD AUTO: 11.2 K/UL (ref 1.8–7.7)
NEUTROPHILS NFR BLD: 81.8 % (ref 38–73)
NRBC BLD-RTO: 0 /100 WBC
PLATELET # BLD AUTO: 617 K/UL (ref 150–450)
PMV BLD AUTO: 10.1 FL (ref 9.2–12.9)
POCT GLUCOSE: 105 MG/DL (ref 70–110)
POCT GLUCOSE: 134 MG/DL (ref 70–110)
POCT GLUCOSE: 160 MG/DL (ref 70–110)
POCT GLUCOSE: 162 MG/DL (ref 70–110)
POTASSIUM SERPL-SCNC: 4.1 MMOL/L (ref 3.5–5.1)
PROT SERPL-MCNC: 5.7 G/DL (ref 6–8.4)
RBC # BLD AUTO: 2.92 M/UL (ref 4–5.4)
SODIUM SERPL-SCNC: 136 MMOL/L (ref 136–145)
WBC # BLD AUTO: 13.71 K/UL (ref 3.9–12.7)

## 2021-06-05 PROCEDURE — 63600175 PHARM REV CODE 636 W HCPCS: Performed by: STUDENT IN AN ORGANIZED HEALTH CARE EDUCATION/TRAINING PROGRAM

## 2021-06-05 PROCEDURE — 25000003 PHARM REV CODE 250: Performed by: STUDENT IN AN ORGANIZED HEALTH CARE EDUCATION/TRAINING PROGRAM

## 2021-06-05 PROCEDURE — 99024 PR POST-OP FOLLOW-UP VISIT: ICD-10-PCS | Mod: POP,,, | Performed by: SURGERY

## 2021-06-05 PROCEDURE — 85025 COMPLETE CBC W/AUTO DIFF WBC: CPT | Performed by: SURGERY

## 2021-06-05 PROCEDURE — 80053 COMPREHEN METABOLIC PANEL: CPT | Performed by: SURGERY

## 2021-06-05 PROCEDURE — 20600001 HC STEP DOWN PRIVATE ROOM

## 2021-06-05 PROCEDURE — 99024 POSTOP FOLLOW-UP VISIT: CPT | Mod: POP,,, | Performed by: SURGERY

## 2021-06-05 PROCEDURE — 63600175 PHARM REV CODE 636 W HCPCS: Mod: JG | Performed by: STUDENT IN AN ORGANIZED HEALTH CARE EDUCATION/TRAINING PROGRAM

## 2021-06-05 PROCEDURE — 25000003 PHARM REV CODE 250: Performed by: NURSE PRACTITIONER

## 2021-06-05 PROCEDURE — 25000003 PHARM REV CODE 250: Performed by: INTERNAL MEDICINE

## 2021-06-05 RX ADMIN — CEFTAZIDIME, AVIBACTAM 2.5 G: 2; .5 POWDER, FOR SOLUTION INTRAVENOUS at 10:06

## 2021-06-05 RX ADMIN — METRONIDAZOLE 500 MG: 500 TABLET ORAL at 06:06

## 2021-06-05 RX ADMIN — METRONIDAZOLE 500 MG: 500 TABLET ORAL at 02:06

## 2021-06-05 RX ADMIN — OXYCODONE 5 MG: 5 TABLET ORAL at 08:06

## 2021-06-05 RX ADMIN — NYSTATIN: 100000 POWDER TOPICAL at 10:06

## 2021-06-05 RX ADMIN — OXYCODONE 5 MG: 5 TABLET ORAL at 02:06

## 2021-06-05 RX ADMIN — NYSTATIN: 100000 POWDER TOPICAL at 09:06

## 2021-06-05 RX ADMIN — METRONIDAZOLE 500 MG: 500 TABLET ORAL at 10:06

## 2021-06-05 RX ADMIN — CEFTAZIDIME, AVIBACTAM 2.5 G: 2; .5 POWDER, FOR SOLUTION INTRAVENOUS at 06:06

## 2021-06-05 RX ADMIN — INSULIN DETEMIR 8 UNITS: 100 INJECTION, SOLUTION SUBCUTANEOUS at 10:06

## 2021-06-05 RX ADMIN — ONDANSETRON 4 MG: 2 INJECTION INTRAMUSCULAR; INTRAVENOUS at 05:06

## 2021-06-05 RX ADMIN — CEFTAZIDIME, AVIBACTAM 2.5 G: 2; .5 POWDER, FOR SOLUTION INTRAVENOUS at 02:06

## 2021-06-05 RX ADMIN — MIRTAZAPINE 7.5 MG: 7.5 TABLET ORAL at 08:06

## 2021-06-05 RX ADMIN — OXYCODONE 5 MG: 5 TABLET ORAL at 07:06

## 2021-06-05 RX ADMIN — MEGESTROL ACETATE 20 MG: 20 TABLET ORAL at 10:06

## 2021-06-05 RX ADMIN — ESCITALOPRAM OXALATE 10 MG: 10 TABLET ORAL at 10:06

## 2021-06-05 RX ADMIN — SITAGLIPTIN 100 MG: 100 TABLET, FILM COATED ORAL at 10:06

## 2021-06-05 RX ADMIN — ATORVASTATIN CALCIUM 80 MG: 20 TABLET, FILM COATED ORAL at 08:06

## 2021-06-06 LAB
ALBUMIN SERPL BCP-MCNC: 0.7 G/DL (ref 3.5–5.2)
ALP SERPL-CCNC: 288 U/L (ref 55–135)
ALT SERPL W/O P-5'-P-CCNC: 23 U/L (ref 10–44)
ANION GAP SERPL CALC-SCNC: 7 MMOL/L (ref 8–16)
AST SERPL-CCNC: 48 U/L (ref 10–40)
BASOPHILS # BLD AUTO: 0.05 K/UL (ref 0–0.2)
BASOPHILS NFR BLD: 0.4 % (ref 0–1.9)
BILIRUB SERPL-MCNC: 0.3 MG/DL (ref 0.1–1)
BUN SERPL-MCNC: 15 MG/DL (ref 6–20)
CALCIUM SERPL-MCNC: 7.5 MG/DL (ref 8.7–10.5)
CHLORIDE SERPL-SCNC: 113 MMOL/L (ref 95–110)
CO2 SERPL-SCNC: 18 MMOL/L (ref 23–29)
CREAT SERPL-MCNC: 0.8 MG/DL (ref 0.5–1.4)
DIFFERENTIAL METHOD: ABNORMAL
EOSINOPHIL # BLD AUTO: 0.2 K/UL (ref 0–0.5)
EOSINOPHIL NFR BLD: 1.5 % (ref 0–8)
ERYTHROCYTE [DISTWIDTH] IN BLOOD BY AUTOMATED COUNT: 18.1 % (ref 11.5–14.5)
EST. GFR  (AFRICAN AMERICAN): >60 ML/MIN/1.73 M^2
EST. GFR  (NON AFRICAN AMERICAN): >60 ML/MIN/1.73 M^2
GLUCOSE SERPL-MCNC: 104 MG/DL (ref 70–110)
HCT VFR BLD AUTO: 24.5 % (ref 37–48.5)
HGB BLD-MCNC: 7.6 G/DL (ref 12–16)
IMM GRANULOCYTES # BLD AUTO: 0.1 K/UL (ref 0–0.04)
IMM GRANULOCYTES NFR BLD AUTO: 0.9 % (ref 0–0.5)
LYMPHOCYTES # BLD AUTO: 1 K/UL (ref 1–4.8)
LYMPHOCYTES NFR BLD: 8.1 % (ref 18–48)
MCH RBC QN AUTO: 27.8 PG (ref 27–31)
MCHC RBC AUTO-ENTMCNC: 31 G/DL (ref 32–36)
MCV RBC AUTO: 90 FL (ref 82–98)
MONOCYTES # BLD AUTO: 1.3 K/UL (ref 0.3–1)
MONOCYTES NFR BLD: 11 % (ref 4–15)
NEUTROPHILS # BLD AUTO: 9.1 K/UL (ref 1.8–7.7)
NEUTROPHILS NFR BLD: 78.1 % (ref 38–73)
NRBC BLD-RTO: 0 /100 WBC
PHOSPHATE SERPL-MCNC: 1.9 MG/DL (ref 2.7–4.5)
PHOSPHATE SERPL-MCNC: 3.3 MG/DL (ref 2.7–4.5)
PLATELET # BLD AUTO: 574 K/UL (ref 150–450)
PMV BLD AUTO: 9.9 FL (ref 9.2–12.9)
POCT GLUCOSE: 109 MG/DL (ref 70–110)
POCT GLUCOSE: 114 MG/DL (ref 70–110)
POCT GLUCOSE: 122 MG/DL (ref 70–110)
POCT GLUCOSE: 136 MG/DL (ref 70–110)
POTASSIUM SERPL-SCNC: 3.8 MMOL/L (ref 3.5–5.1)
PROT SERPL-MCNC: 5.3 G/DL (ref 6–8.4)
RBC # BLD AUTO: 2.73 M/UL (ref 4–5.4)
SODIUM SERPL-SCNC: 138 MMOL/L (ref 136–145)
WBC # BLD AUTO: 11.7 K/UL (ref 3.9–12.7)

## 2021-06-06 PROCEDURE — 80053 COMPREHEN METABOLIC PANEL: CPT | Performed by: SURGERY

## 2021-06-06 PROCEDURE — 93005 ELECTROCARDIOGRAM TRACING: CPT

## 2021-06-06 PROCEDURE — 25000003 PHARM REV CODE 250: Performed by: STUDENT IN AN ORGANIZED HEALTH CARE EDUCATION/TRAINING PROGRAM

## 2021-06-06 PROCEDURE — 25000003 PHARM REV CODE 250: Performed by: NURSE PRACTITIONER

## 2021-06-06 PROCEDURE — 25000003 PHARM REV CODE 250: Performed by: SURGERY

## 2021-06-06 PROCEDURE — 85025 COMPLETE CBC W/AUTO DIFF WBC: CPT | Performed by: SURGERY

## 2021-06-06 PROCEDURE — 20600001 HC STEP DOWN PRIVATE ROOM

## 2021-06-06 PROCEDURE — 63600175 PHARM REV CODE 636 W HCPCS: Mod: JG | Performed by: STUDENT IN AN ORGANIZED HEALTH CARE EDUCATION/TRAINING PROGRAM

## 2021-06-06 PROCEDURE — 63600175 PHARM REV CODE 636 W HCPCS: Performed by: STUDENT IN AN ORGANIZED HEALTH CARE EDUCATION/TRAINING PROGRAM

## 2021-06-06 PROCEDURE — 93010 EKG 12-LEAD: ICD-10-PCS | Mod: ,,, | Performed by: INTERNAL MEDICINE

## 2021-06-06 PROCEDURE — 84100 ASSAY OF PHOSPHORUS: CPT | Performed by: SURGERY

## 2021-06-06 PROCEDURE — 93010 ELECTROCARDIOGRAM REPORT: CPT | Mod: ,,, | Performed by: INTERNAL MEDICINE

## 2021-06-06 PROCEDURE — 25000003 PHARM REV CODE 250: Performed by: INTERNAL MEDICINE

## 2021-06-06 RX ORDER — SODIUM CHLORIDE 9 MG/ML
INJECTION, SOLUTION INTRAVENOUS CONTINUOUS PRN
Status: DISCONTINUED | OUTPATIENT
Start: 2021-06-06 | End: 2021-06-08 | Stop reason: HOSPADM

## 2021-06-06 RX ADMIN — MEGESTROL ACETATE 20 MG: 20 TABLET ORAL at 09:06

## 2021-06-06 RX ADMIN — METRONIDAZOLE 500 MG: 500 TABLET ORAL at 06:06

## 2021-06-06 RX ADMIN — CEFTAZIDIME, AVIBACTAM 2.5 G: 2; .5 POWDER, FOR SOLUTION INTRAVENOUS at 10:06

## 2021-06-06 RX ADMIN — MIRTAZAPINE 7.5 MG: 7.5 TABLET ORAL at 09:06

## 2021-06-06 RX ADMIN — SODIUM PHOSPHATE, MONOBASIC, MONOHYDRATE 39.99 MMOL: 276; 142 INJECTION, SOLUTION INTRAVENOUS at 01:06

## 2021-06-06 RX ADMIN — METRONIDAZOLE 500 MG: 500 TABLET ORAL at 02:06

## 2021-06-06 RX ADMIN — OXYCODONE 5 MG: 5 TABLET ORAL at 09:06

## 2021-06-06 RX ADMIN — CEFTAZIDIME, AVIBACTAM 2.5 G: 2; .5 POWDER, FOR SOLUTION INTRAVENOUS at 06:06

## 2021-06-06 RX ADMIN — ONDANSETRON 4 MG: 2 INJECTION INTRAMUSCULAR; INTRAVENOUS at 02:06

## 2021-06-06 RX ADMIN — OXYCODONE 5 MG: 5 TABLET ORAL at 06:06

## 2021-06-06 RX ADMIN — NYSTATIN: 100000 POWDER TOPICAL at 09:06

## 2021-06-06 RX ADMIN — SITAGLIPTIN 100 MG: 100 TABLET, FILM COATED ORAL at 09:06

## 2021-06-06 RX ADMIN — OXYCODONE 5 MG: 5 TABLET ORAL at 02:06

## 2021-06-06 RX ADMIN — SODIUM CHLORIDE: 0.9 INJECTION, SOLUTION INTRAVENOUS at 10:06

## 2021-06-06 RX ADMIN — METRONIDAZOLE 500 MG: 500 TABLET ORAL at 09:06

## 2021-06-06 RX ADMIN — CEFTAZIDIME, AVIBACTAM 2.5 G: 2; .5 POWDER, FOR SOLUTION INTRAVENOUS at 03:06

## 2021-06-06 RX ADMIN — ATORVASTATIN CALCIUM 80 MG: 20 TABLET, FILM COATED ORAL at 09:06

## 2021-06-06 RX ADMIN — ESCITALOPRAM OXALATE 10 MG: 10 TABLET ORAL at 09:06

## 2021-06-06 RX ADMIN — INSULIN DETEMIR 8 UNITS: 100 INJECTION, SOLUTION SUBCUTANEOUS at 09:06

## 2021-06-07 LAB
ALBUMIN SERPL BCP-MCNC: 0.7 G/DL (ref 3.5–5.2)
ALBUMIN SERPL BCP-MCNC: 0.7 G/DL (ref 3.5–5.2)
ALP SERPL-CCNC: 272 U/L (ref 55–135)
ALP SERPL-CCNC: 273 U/L (ref 55–135)
ALT SERPL W/O P-5'-P-CCNC: 21 U/L (ref 10–44)
ALT SERPL W/O P-5'-P-CCNC: 24 U/L (ref 10–44)
ANION GAP SERPL CALC-SCNC: 7 MMOL/L (ref 8–16)
ANION GAP SERPL CALC-SCNC: 8 MMOL/L (ref 8–16)
AST SERPL-CCNC: 44 U/L (ref 10–40)
AST SERPL-CCNC: 44 U/L (ref 10–40)
BASOPHILS # BLD AUTO: 0.04 K/UL (ref 0–0.2)
BASOPHILS # BLD AUTO: 0.05 K/UL (ref 0–0.2)
BASOPHILS NFR BLD: 0.3 % (ref 0–1.9)
BASOPHILS NFR BLD: 0.4 % (ref 0–1.9)
BILIRUB DIRECT SERPL-MCNC: 0.2 MG/DL (ref 0.1–0.3)
BILIRUB SERPL-MCNC: 0.3 MG/DL (ref 0.1–1)
BILIRUB SERPL-MCNC: 0.3 MG/DL (ref 0.1–1)
BUN SERPL-MCNC: 15 MG/DL (ref 6–20)
BUN SERPL-MCNC: 15 MG/DL (ref 6–20)
CALCIUM SERPL-MCNC: 7.3 MG/DL (ref 8.7–10.5)
CALCIUM SERPL-MCNC: 7.3 MG/DL (ref 8.7–10.5)
CHLORIDE SERPL-SCNC: 112 MMOL/L (ref 95–110)
CHLORIDE SERPL-SCNC: 112 MMOL/L (ref 95–110)
CO2 SERPL-SCNC: 16 MMOL/L (ref 23–29)
CO2 SERPL-SCNC: 18 MMOL/L (ref 23–29)
CREAT SERPL-MCNC: 0.9 MG/DL (ref 0.5–1.4)
CREAT SERPL-MCNC: 0.9 MG/DL (ref 0.5–1.4)
DIFFERENTIAL METHOD: ABNORMAL
DIFFERENTIAL METHOD: ABNORMAL
EOSINOPHIL # BLD AUTO: 0.2 K/UL (ref 0–0.5)
EOSINOPHIL # BLD AUTO: 0.2 K/UL (ref 0–0.5)
EOSINOPHIL NFR BLD: 1.7 % (ref 0–8)
EOSINOPHIL NFR BLD: 1.7 % (ref 0–8)
ERYTHROCYTE [DISTWIDTH] IN BLOOD BY AUTOMATED COUNT: 18.4 % (ref 11.5–14.5)
ERYTHROCYTE [DISTWIDTH] IN BLOOD BY AUTOMATED COUNT: 18.6 % (ref 11.5–14.5)
EST. GFR  (AFRICAN AMERICAN): >60 ML/MIN/1.73 M^2
EST. GFR  (AFRICAN AMERICAN): >60 ML/MIN/1.73 M^2
EST. GFR  (NON AFRICAN AMERICAN): >60 ML/MIN/1.73 M^2
EST. GFR  (NON AFRICAN AMERICAN): >60 ML/MIN/1.73 M^2
FINAL PATHOLOGIC DIAGNOSIS: NORMAL
FUNGUS SPEC CULT: NORMAL
GLUCOSE SERPL-MCNC: 101 MG/DL (ref 70–110)
GLUCOSE SERPL-MCNC: 105 MG/DL (ref 70–110)
GROSS: NORMAL
HCT VFR BLD AUTO: 25.2 % (ref 37–48.5)
HCT VFR BLD AUTO: 25.5 % (ref 37–48.5)
HGB BLD-MCNC: 7.8 G/DL (ref 12–16)
HGB BLD-MCNC: 8 G/DL (ref 12–16)
IMM GRANULOCYTES # BLD AUTO: 0.12 K/UL (ref 0–0.04)
IMM GRANULOCYTES # BLD AUTO: 0.13 K/UL (ref 0–0.04)
IMM GRANULOCYTES NFR BLD AUTO: 1 % (ref 0–0.5)
IMM GRANULOCYTES NFR BLD AUTO: 1.1 % (ref 0–0.5)
LYMPHOCYTES # BLD AUTO: 0.8 K/UL (ref 1–4.8)
LYMPHOCYTES # BLD AUTO: 0.9 K/UL (ref 1–4.8)
LYMPHOCYTES NFR BLD: 6.6 % (ref 18–48)
LYMPHOCYTES NFR BLD: 7.1 % (ref 18–48)
Lab: NORMAL
MCH RBC QN AUTO: 28.4 PG (ref 27–31)
MCH RBC QN AUTO: 29 PG (ref 27–31)
MCHC RBC AUTO-ENTMCNC: 31 G/DL (ref 32–36)
MCHC RBC AUTO-ENTMCNC: 31.4 G/DL (ref 32–36)
MCV RBC AUTO: 92 FL (ref 82–98)
MCV RBC AUTO: 92 FL (ref 82–98)
MONOCYTES # BLD AUTO: 1.3 K/UL (ref 0.3–1)
MONOCYTES # BLD AUTO: 1.3 K/UL (ref 0.3–1)
MONOCYTES NFR BLD: 10.8 % (ref 4–15)
MONOCYTES NFR BLD: 10.9 % (ref 4–15)
NEUTROPHILS # BLD AUTO: 9.7 K/UL (ref 1.8–7.7)
NEUTROPHILS # BLD AUTO: 9.8 K/UL (ref 1.8–7.7)
NEUTROPHILS NFR BLD: 78.8 % (ref 38–73)
NEUTROPHILS NFR BLD: 79.6 % (ref 38–73)
NRBC BLD-RTO: 0 /100 WBC
NRBC BLD-RTO: 0 /100 WBC
PHOSPHATE SERPL-MCNC: 2.9 MG/DL (ref 2.7–4.5)
PLATELET # BLD AUTO: 556 K/UL (ref 150–450)
PLATELET # BLD AUTO: 557 K/UL (ref 150–450)
PMV BLD AUTO: 10.1 FL (ref 9.2–12.9)
PMV BLD AUTO: 10.3 FL (ref 9.2–12.9)
POCT GLUCOSE: 135 MG/DL (ref 70–110)
POCT GLUCOSE: 148 MG/DL (ref 70–110)
POCT GLUCOSE: 160 MG/DL (ref 70–110)
POTASSIUM SERPL-SCNC: 3.4 MMOL/L (ref 3.5–5.1)
POTASSIUM SERPL-SCNC: 3.4 MMOL/L (ref 3.5–5.1)
PREALB SERPL-MCNC: 8 MG/DL (ref 20–43)
PROT SERPL-MCNC: 5.3 G/DL (ref 6–8.4)
PROT SERPL-MCNC: 5.3 G/DL (ref 6–8.4)
RBC # BLD AUTO: 2.75 M/UL (ref 4–5.4)
RBC # BLD AUTO: 2.76 M/UL (ref 4–5.4)
SODIUM SERPL-SCNC: 136 MMOL/L (ref 136–145)
SODIUM SERPL-SCNC: 137 MMOL/L (ref 136–145)
WBC # BLD AUTO: 12.28 K/UL (ref 3.9–12.7)
WBC # BLD AUTO: 12.29 K/UL (ref 3.9–12.7)

## 2021-06-07 PROCEDURE — 80053 COMPREHEN METABOLIC PANEL: CPT | Performed by: SURGERY

## 2021-06-07 PROCEDURE — 25000003 PHARM REV CODE 250: Performed by: STUDENT IN AN ORGANIZED HEALTH CARE EDUCATION/TRAINING PROGRAM

## 2021-06-07 PROCEDURE — 93010 ELECTROCARDIOGRAM REPORT: CPT | Mod: ,,, | Performed by: INTERNAL MEDICINE

## 2021-06-07 PROCEDURE — 99024 PR POST-OP FOLLOW-UP VISIT: ICD-10-PCS | Mod: POP,,, | Performed by: SURGERY

## 2021-06-07 PROCEDURE — 80048 BASIC METABOLIC PNL TOTAL CA: CPT | Performed by: STUDENT IN AN ORGANIZED HEALTH CARE EDUCATION/TRAINING PROGRAM

## 2021-06-07 PROCEDURE — 85025 COMPLETE CBC W/AUTO DIFF WBC: CPT | Mod: 91 | Performed by: STUDENT IN AN ORGANIZED HEALTH CARE EDUCATION/TRAINING PROGRAM

## 2021-06-07 PROCEDURE — 99024 POSTOP FOLLOW-UP VISIT: CPT | Mod: POP,,, | Performed by: SURGERY

## 2021-06-07 PROCEDURE — 85025 COMPLETE CBC W/AUTO DIFF WBC: CPT | Performed by: SURGERY

## 2021-06-07 PROCEDURE — 84100 ASSAY OF PHOSPHORUS: CPT | Performed by: STUDENT IN AN ORGANIZED HEALTH CARE EDUCATION/TRAINING PROGRAM

## 2021-06-07 PROCEDURE — 25000003 PHARM REV CODE 250: Performed by: NURSE PRACTITIONER

## 2021-06-07 PROCEDURE — 20600001 HC STEP DOWN PRIVATE ROOM

## 2021-06-07 PROCEDURE — 93010 EKG 12-LEAD: ICD-10-PCS | Mod: ,,, | Performed by: INTERNAL MEDICINE

## 2021-06-07 PROCEDURE — 36430 TRANSFUSION BLD/BLD COMPNT: CPT

## 2021-06-07 PROCEDURE — 63600175 PHARM REV CODE 636 W HCPCS: Mod: JG | Performed by: STUDENT IN AN ORGANIZED HEALTH CARE EDUCATION/TRAINING PROGRAM

## 2021-06-07 PROCEDURE — 25000242 PHARM REV CODE 250 ALT 637 W/ HCPCS: Performed by: STUDENT IN AN ORGANIZED HEALTH CARE EDUCATION/TRAINING PROGRAM

## 2021-06-07 PROCEDURE — 25000003 PHARM REV CODE 250: Performed by: INTERNAL MEDICINE

## 2021-06-07 PROCEDURE — 80076 HEPATIC FUNCTION PANEL: CPT | Performed by: STUDENT IN AN ORGANIZED HEALTH CARE EDUCATION/TRAINING PROGRAM

## 2021-06-07 PROCEDURE — 84134 ASSAY OF PREALBUMIN: CPT | Performed by: STUDENT IN AN ORGANIZED HEALTH CARE EDUCATION/TRAINING PROGRAM

## 2021-06-07 PROCEDURE — 93005 ELECTROCARDIOGRAM TRACING: CPT

## 2021-06-07 RX ORDER — LEVOFLOXACIN 750 MG/1
750 TABLET ORAL DAILY
Status: DISCONTINUED | OUTPATIENT
Start: 2021-06-07 | End: 2021-06-08 | Stop reason: HOSPADM

## 2021-06-07 RX ADMIN — METRONIDAZOLE 500 MG: 500 TABLET ORAL at 09:06

## 2021-06-07 RX ADMIN — LEVOFLOXACIN 750 MG: 750 TABLET, FILM COATED ORAL at 08:06

## 2021-06-07 RX ADMIN — MEGESTROL ACETATE 20 MG: 20 TABLET ORAL at 09:06

## 2021-06-07 RX ADMIN — NYSTATIN: 100000 POWDER TOPICAL at 08:06

## 2021-06-07 RX ADMIN — NYSTATIN: 100000 POWDER TOPICAL at 09:06

## 2021-06-07 RX ADMIN — SODIUM PHOSPHATE, MONOBASIC, MONOHYDRATE 39.99 MMOL: 276; 142 INJECTION, SOLUTION INTRAVENOUS at 09:06

## 2021-06-07 RX ADMIN — CEFTAZIDIME, AVIBACTAM 2.5 G: 2; .5 POWDER, FOR SOLUTION INTRAVENOUS at 06:06

## 2021-06-07 RX ADMIN — MELATONIN TAB 3 MG 6 MG: 3 TAB at 11:06

## 2021-06-07 RX ADMIN — MIRTAZAPINE 7.5 MG: 7.5 TABLET ORAL at 09:06

## 2021-06-07 RX ADMIN — METRONIDAZOLE 500 MG: 500 TABLET ORAL at 03:06

## 2021-06-07 RX ADMIN — OXYCODONE 5 MG: 5 TABLET ORAL at 03:06

## 2021-06-07 RX ADMIN — OXYCODONE 5 MG: 5 TABLET ORAL at 08:06

## 2021-06-07 RX ADMIN — INSULIN DETEMIR 8 UNITS: 100 INJECTION, SOLUTION SUBCUTANEOUS at 08:06

## 2021-06-07 RX ADMIN — METRONIDAZOLE 500 MG: 500 TABLET ORAL at 05:06

## 2021-06-07 RX ADMIN — SITAGLIPTIN 100 MG: 100 TABLET, FILM COATED ORAL at 08:06

## 2021-06-07 RX ADMIN — ATORVASTATIN CALCIUM 80 MG: 20 TABLET, FILM COATED ORAL at 09:06

## 2021-06-07 RX ADMIN — OXYCODONE 5 MG: 5 TABLET ORAL at 09:06

## 2021-06-07 RX ADMIN — PSYLLIUM HUSK 1 PACKET: 3.4 POWDER ORAL at 08:06

## 2021-06-07 RX ADMIN — ESCITALOPRAM OXALATE 10 MG: 10 TABLET ORAL at 08:06

## 2021-06-08 VITALS
TEMPERATURE: 98 F | DIASTOLIC BLOOD PRESSURE: 58 MMHG | RESPIRATION RATE: 20 BRPM | BODY MASS INDEX: 36.77 KG/M2 | SYSTOLIC BLOOD PRESSURE: 103 MMHG | WEIGHT: 220.69 LBS | HEART RATE: 102 BPM | OXYGEN SATURATION: 99 % | HEIGHT: 65 IN

## 2021-06-08 PROBLEM — A41.9 SEPSIS: Status: RESOLVED | Noted: 2021-05-19 | Resolved: 2021-06-08

## 2021-06-08 LAB
ALBUMIN SERPL BCP-MCNC: 0.7 G/DL (ref 3.5–5.2)
ALP SERPL-CCNC: 262 U/L (ref 55–135)
ALT SERPL W/O P-5'-P-CCNC: 22 U/L (ref 10–44)
ANION GAP SERPL CALC-SCNC: 8 MMOL/L (ref 8–16)
AST SERPL-CCNC: 40 U/L (ref 10–40)
BASOPHILS # BLD AUTO: 0.04 K/UL (ref 0–0.2)
BASOPHILS NFR BLD: 0.3 % (ref 0–1.9)
BILIRUB SERPL-MCNC: 0.3 MG/DL (ref 0.1–1)
BUN SERPL-MCNC: 15 MG/DL (ref 6–20)
CALCIUM SERPL-MCNC: 7 MG/DL (ref 8.7–10.5)
CHLORIDE SERPL-SCNC: 113 MMOL/L (ref 95–110)
CO2 SERPL-SCNC: 16 MMOL/L (ref 23–29)
CREAT SERPL-MCNC: 1 MG/DL (ref 0.5–1.4)
DIFFERENTIAL METHOD: ABNORMAL
EOSINOPHIL # BLD AUTO: 0.3 K/UL (ref 0–0.5)
EOSINOPHIL NFR BLD: 2.3 % (ref 0–8)
ERYTHROCYTE [DISTWIDTH] IN BLOOD BY AUTOMATED COUNT: 18.6 % (ref 11.5–14.5)
EST. GFR  (AFRICAN AMERICAN): >60 ML/MIN/1.73 M^2
EST. GFR  (NON AFRICAN AMERICAN): >60 ML/MIN/1.73 M^2
GLUCOSE SERPL-MCNC: 80 MG/DL (ref 70–110)
HCT VFR BLD AUTO: 24.3 % (ref 37–48.5)
HGB BLD-MCNC: 7.7 G/DL (ref 12–16)
IMM GRANULOCYTES # BLD AUTO: 0.14 K/UL (ref 0–0.04)
IMM GRANULOCYTES NFR BLD AUTO: 1.1 % (ref 0–0.5)
LYMPHOCYTES # BLD AUTO: 1.1 K/UL (ref 1–4.8)
LYMPHOCYTES NFR BLD: 8.5 % (ref 18–48)
MCH RBC QN AUTO: 28.8 PG (ref 27–31)
MCHC RBC AUTO-ENTMCNC: 31.7 G/DL (ref 32–36)
MCV RBC AUTO: 91 FL (ref 82–98)
MONOCYTES # BLD AUTO: 1.4 K/UL (ref 0.3–1)
MONOCYTES NFR BLD: 11.6 % (ref 4–15)
NEUTROPHILS # BLD AUTO: 9.4 K/UL (ref 1.8–7.7)
NEUTROPHILS NFR BLD: 76.2 % (ref 38–73)
NRBC BLD-RTO: 0 /100 WBC
PHOSPHATE SERPL-MCNC: 3.8 MG/DL (ref 2.7–4.5)
PLATELET # BLD AUTO: 499 K/UL (ref 150–450)
PMV BLD AUTO: 10.1 FL (ref 9.2–12.9)
POCT GLUCOSE: 134 MG/DL (ref 70–110)
POCT GLUCOSE: 97 MG/DL (ref 70–110)
POTASSIUM SERPL-SCNC: 3.2 MMOL/L (ref 3.5–5.1)
PROT SERPL-MCNC: 5.2 G/DL (ref 6–8.4)
RBC # BLD AUTO: 2.67 M/UL (ref 4–5.4)
SODIUM SERPL-SCNC: 137 MMOL/L (ref 136–145)
WBC # BLD AUTO: 12.29 K/UL (ref 3.9–12.7)

## 2021-06-08 PROCEDURE — 93005 ELECTROCARDIOGRAM TRACING: CPT

## 2021-06-08 PROCEDURE — 25000003 PHARM REV CODE 250: Performed by: STUDENT IN AN ORGANIZED HEALTH CARE EDUCATION/TRAINING PROGRAM

## 2021-06-08 PROCEDURE — 97530 THERAPEUTIC ACTIVITIES: CPT

## 2021-06-08 PROCEDURE — 93010 EKG 12-LEAD: ICD-10-PCS | Mod: ,,, | Performed by: INTERNAL MEDICINE

## 2021-06-08 PROCEDURE — 99232 SBSQ HOSP IP/OBS MODERATE 35: CPT | Mod: ,,, | Performed by: NURSE PRACTITIONER

## 2021-06-08 PROCEDURE — 80053 COMPREHEN METABOLIC PANEL: CPT | Performed by: SURGERY

## 2021-06-08 PROCEDURE — 97542 WHEELCHAIR MNGMENT TRAINING: CPT

## 2021-06-08 PROCEDURE — 97110 THERAPEUTIC EXERCISES: CPT

## 2021-06-08 PROCEDURE — 84100 ASSAY OF PHOSPHORUS: CPT | Performed by: STUDENT IN AN ORGANIZED HEALTH CARE EDUCATION/TRAINING PROGRAM

## 2021-06-08 PROCEDURE — 25000003 PHARM REV CODE 250: Performed by: NURSE PRACTITIONER

## 2021-06-08 PROCEDURE — 85025 COMPLETE CBC W/AUTO DIFF WBC: CPT | Performed by: SURGERY

## 2021-06-08 PROCEDURE — 25000003 PHARM REV CODE 250: Performed by: INTERNAL MEDICINE

## 2021-06-08 PROCEDURE — 93010 ELECTROCARDIOGRAM REPORT: CPT | Mod: ,,, | Performed by: INTERNAL MEDICINE

## 2021-06-08 PROCEDURE — 99232 PR SUBSEQUENT HOSPITAL CARE,LEVL II: ICD-10-PCS | Mod: ,,, | Performed by: NURSE PRACTITIONER

## 2021-06-08 RX ORDER — MIRTAZAPINE 7.5 MG/1
7.5 TABLET, FILM COATED ORAL NIGHTLY
Qty: 30 TABLET | Refills: 11 | Status: ON HOLD | OUTPATIENT
Start: 2021-06-08 | End: 2022-01-10 | Stop reason: SDUPTHER

## 2021-06-08 RX ORDER — ATORVASTATIN CALCIUM 80 MG/1
80 TABLET, FILM COATED ORAL NIGHTLY
Qty: 90 TABLET | Refills: 3 | Status: SHIPPED | OUTPATIENT
Start: 2021-06-08 | End: 2022-05-03

## 2021-06-08 RX ORDER — INSULIN ASPART 100 [IU]/ML
0-5 INJECTION, SOLUTION INTRAVENOUS; SUBCUTANEOUS
Qty: 3 ML | Refills: 11 | Status: SHIPPED | OUTPATIENT
Start: 2021-06-08 | End: 2021-08-11 | Stop reason: SDUPTHER

## 2021-06-08 RX ORDER — ESCITALOPRAM OXALATE 10 MG/1
10 TABLET ORAL DAILY
Qty: 30 TABLET | Refills: 11 | Status: ON HOLD | OUTPATIENT
Start: 2021-06-09 | End: 2022-01-10 | Stop reason: HOSPADM

## 2021-06-08 RX ORDER — ALOGLIPTIN 25 MG/1
25 TABLET, FILM COATED ORAL DAILY
Qty: 30 TABLET | Refills: 11 | Status: SHIPPED | OUTPATIENT
Start: 2021-06-08 | End: 2022-01-11

## 2021-06-08 RX ORDER — METRONIDAZOLE 500 MG/1
500 TABLET ORAL EVERY 8 HOURS
Qty: 105 TABLET | Refills: 0 | Status: ON HOLD | OUTPATIENT
Start: 2021-06-08 | End: 2021-07-01 | Stop reason: SDUPTHER

## 2021-06-08 RX ORDER — NYSTATIN 100000 [USP'U]/G
POWDER TOPICAL 2 TIMES DAILY
Qty: 60 G | Refills: 11 | Status: SHIPPED | OUTPATIENT
Start: 2021-06-08 | End: 2022-01-11

## 2021-06-08 RX ORDER — LEVOFLOXACIN 750 MG/1
750 TABLET ORAL DAILY
Qty: 35 TABLET | Refills: 0 | Status: ON HOLD | OUTPATIENT
Start: 2021-06-09 | End: 2021-07-01 | Stop reason: SDUPTHER

## 2021-06-08 RX ORDER — OXYCODONE HYDROCHLORIDE 5 MG/1
5 TABLET ORAL EVERY 4 HOURS PRN
Qty: 28 TABLET | Refills: 0 | Status: ON HOLD | OUTPATIENT
Start: 2021-06-08 | End: 2021-06-25 | Stop reason: SDUPTHER

## 2021-06-08 RX ORDER — MEGESTROL ACETATE 20 MG/1
20 TABLET ORAL DAILY
Qty: 30 TABLET | Refills: 11 | Status: ON HOLD | OUTPATIENT
Start: 2021-06-09 | End: 2021-06-25 | Stop reason: HOSPADM

## 2021-06-08 RX ADMIN — MEGESTROL ACETATE 20 MG: 20 TABLET ORAL at 08:06

## 2021-06-08 RX ADMIN — METRONIDAZOLE 500 MG: 500 TABLET ORAL at 06:06

## 2021-06-08 RX ADMIN — OXYCODONE 5 MG: 5 TABLET ORAL at 07:06

## 2021-06-08 RX ADMIN — SITAGLIPTIN 100 MG: 100 TABLET, FILM COATED ORAL at 08:06

## 2021-06-08 RX ADMIN — OXYCODONE 5 MG: 5 TABLET ORAL at 03:06

## 2021-06-08 RX ADMIN — LEVOFLOXACIN 750 MG: 750 TABLET, FILM COATED ORAL at 08:06

## 2021-06-08 RX ADMIN — NYSTATIN: 100000 POWDER TOPICAL at 08:06

## 2021-06-08 RX ADMIN — INSULIN DETEMIR 8 UNITS: 100 INJECTION, SOLUTION SUBCUTANEOUS at 08:06

## 2021-06-08 RX ADMIN — ESCITALOPRAM OXALATE 10 MG: 10 TABLET ORAL at 08:06

## 2021-06-10 ENCOUNTER — HOSPITAL ENCOUNTER (OUTPATIENT)
Dept: RADIOLOGY | Facility: HOSPITAL | Age: 56
Discharge: HOME | End: 2021-06-10
Attending: PHYSICAL MEDICINE & REHABILITATION
Payer: MEDICARE

## 2021-06-10 PROCEDURE — 93970 US LOWER EXTREMITY VEINS BILATERAL: ICD-10-PCS | Mod: 26,,, | Performed by: RADIOLOGY

## 2021-06-10 PROCEDURE — 71045 XR CHEST 1 VIEW: ICD-10-PCS | Mod: 26,,, | Performed by: RADIOLOGY

## 2021-06-10 PROCEDURE — 71045 X-RAY EXAM CHEST 1 VIEW: CPT | Mod: 26,,, | Performed by: RADIOLOGY

## 2021-06-10 PROCEDURE — 93970 EXTREMITY STUDY: CPT | Mod: 26,,, | Performed by: RADIOLOGY

## 2021-06-17 ENCOUNTER — HOSPITAL ENCOUNTER (OUTPATIENT)
Dept: RADIOLOGY | Facility: HOSPITAL | Age: 56
Discharge: HOME | End: 2021-06-17
Attending: PHYSICAL MEDICINE & REHABILITATION
Payer: MEDICARE

## 2021-06-17 ENCOUNTER — HOSPITAL ENCOUNTER (INPATIENT)
Facility: HOSPITAL | Age: 56
LOS: 10 days | Discharge: NURSING, REHAB OR LONG-TERM CARE | DRG: 291 | End: 2021-07-01
Attending: EMERGENCY MEDICINE | Admitting: HOSPITALIST
Payer: MEDICARE

## 2021-06-17 DIAGNOSIS — I25.810 CORONARY ARTERY DISEASE INVOLVING CORONARY BYPASS GRAFT OF NATIVE HEART WITHOUT ANGINA PECTORIS: Chronic | ICD-10-CM

## 2021-06-17 DIAGNOSIS — T82.7XXD VASCULAR GRAFT INFECTION, SUBSEQUENT ENCOUNTER: ICD-10-CM

## 2021-06-17 DIAGNOSIS — R60.1 ANASARCA: Primary | ICD-10-CM

## 2021-06-17 DIAGNOSIS — T14.8XXA WOUND INFECTION: ICD-10-CM

## 2021-06-17 DIAGNOSIS — D72.829 LEUKOCYTOSIS, UNSPECIFIED TYPE: ICD-10-CM

## 2021-06-17 DIAGNOSIS — R19.7 DIARRHEA, UNSPECIFIED TYPE: ICD-10-CM

## 2021-06-17 DIAGNOSIS — N18.31 STAGE 3A CHRONIC KIDNEY DISEASE: Chronic | ICD-10-CM

## 2021-06-17 DIAGNOSIS — F33.1 MDD (MAJOR DEPRESSIVE DISORDER), RECURRENT EPISODE, MODERATE: Chronic | ICD-10-CM

## 2021-06-17 DIAGNOSIS — Z91.89 AT RISK FOR PROLONGED QT INTERVAL SYNDROME: ICD-10-CM

## 2021-06-17 DIAGNOSIS — I50.33 ACUTE ON CHRONIC DIASTOLIC CHF (CONGESTIVE HEART FAILURE): ICD-10-CM

## 2021-06-17 DIAGNOSIS — I48.0 PAROXYSMAL ATRIAL FIBRILLATION: Chronic | ICD-10-CM

## 2021-06-17 DIAGNOSIS — E83.42 HYPOMAGNESEMIA: ICD-10-CM

## 2021-06-17 DIAGNOSIS — I73.9 PAD (PERIPHERAL ARTERY DISEASE): ICD-10-CM

## 2021-06-17 DIAGNOSIS — L08.9 WOUND INFECTION: ICD-10-CM

## 2021-06-17 DIAGNOSIS — E11.21 TYPE 2 DIABETES MELLITUS WITH DIABETIC NEPHROPATHY, WITH LONG-TERM CURRENT USE OF INSULIN: ICD-10-CM

## 2021-06-17 DIAGNOSIS — Z74.09 IMPAIRED FUNCTIONAL MOBILITY AND ENDURANCE: ICD-10-CM

## 2021-06-17 DIAGNOSIS — D63.8 ANEMIA, CHRONIC DISEASE: ICD-10-CM

## 2021-06-17 DIAGNOSIS — I25.5 ISCHEMIC CARDIOMYOPATHY: ICD-10-CM

## 2021-06-17 DIAGNOSIS — E88.09 HYPOALBUMINEMIA: ICD-10-CM

## 2021-06-17 DIAGNOSIS — I10 ESSENTIAL HYPERTENSION: Chronic | ICD-10-CM

## 2021-06-17 DIAGNOSIS — M79.89 LEG SWELLING: ICD-10-CM

## 2021-06-17 DIAGNOSIS — I50.33 ACUTE ON CHRONIC DIASTOLIC (CONGESTIVE) HEART FAILURE: ICD-10-CM

## 2021-06-17 DIAGNOSIS — E46 MALNUTRITION, UNSPECIFIED TYPE: ICD-10-CM

## 2021-06-17 DIAGNOSIS — T81.49XA WOUND INFECTION AFTER SURGERY: ICD-10-CM

## 2021-06-17 DIAGNOSIS — N04.9 NEPHROTIC SYNDROME: ICD-10-CM

## 2021-06-17 DIAGNOSIS — Z79.4 TYPE 2 DIABETES MELLITUS WITH DIABETIC NEPHROPATHY, WITH LONG-TERM CURRENT USE OF INSULIN: ICD-10-CM

## 2021-06-17 DIAGNOSIS — E66.01 CLASS 2 SEVERE OBESITY DUE TO EXCESS CALORIES WITH SERIOUS COMORBIDITY AND BODY MASS INDEX (BMI) OF 36.0 TO 36.9 IN ADULT: ICD-10-CM

## 2021-06-17 LAB
ALBUMIN SERPL BCP-MCNC: 1.1 G/DL (ref 3.5–5.2)
ALP SERPL-CCNC: 193 U/L (ref 55–135)
ALT SERPL W/O P-5'-P-CCNC: 14 U/L (ref 10–44)
ANION GAP SERPL CALC-SCNC: 5 MMOL/L (ref 8–16)
AST SERPL-CCNC: 27 U/L (ref 10–40)
BASOPHILS # BLD AUTO: 0.08 K/UL (ref 0–0.2)
BASOPHILS NFR BLD: 0.5 % (ref 0–1.9)
BILIRUB SERPL-MCNC: 0.4 MG/DL (ref 0.1–1)
BNP SERPL-MCNC: 54 PG/ML (ref 0–99)
BUN SERPL-MCNC: 38 MG/DL (ref 6–20)
CALCIUM SERPL-MCNC: 7.9 MG/DL (ref 8.7–10.5)
CHLORIDE SERPL-SCNC: 113 MMOL/L (ref 95–110)
CO2 SERPL-SCNC: 22 MMOL/L (ref 23–29)
CREAT SERPL-MCNC: 1.2 MG/DL (ref 0.5–1.4)
DIFFERENTIAL METHOD: ABNORMAL
EOSINOPHIL # BLD AUTO: 0.2 K/UL (ref 0–0.5)
EOSINOPHIL NFR BLD: 1.6 % (ref 0–8)
ERYTHROCYTE [DISTWIDTH] IN BLOOD BY AUTOMATED COUNT: 20.3 % (ref 11.5–14.5)
EST. GFR  (AFRICAN AMERICAN): 59.2 ML/MIN/1.73 M^2
EST. GFR  (NON AFRICAN AMERICAN): 51.4 ML/MIN/1.73 M^2
GLUCOSE SERPL-MCNC: 121 MG/DL (ref 70–110)
HCT VFR BLD AUTO: 25 % (ref 37–48.5)
HGB BLD-MCNC: 7.7 G/DL (ref 12–16)
IMM GRANULOCYTES # BLD AUTO: 0.14 K/UL (ref 0–0.04)
IMM GRANULOCYTES NFR BLD AUTO: 1 % (ref 0–0.5)
INR PPP: 1.4 (ref 0.8–1.2)
LACTATE SERPL-SCNC: 0.8 MMOL/L (ref 0.5–2.2)
LIPASE SERPL-CCNC: 16 U/L (ref 4–60)
LYMPHOCYTES # BLD AUTO: 1.5 K/UL (ref 1–4.8)
LYMPHOCYTES NFR BLD: 10.4 % (ref 18–48)
MAGNESIUM SERPL-MCNC: 1.4 MG/DL (ref 1.6–2.6)
MCH RBC QN AUTO: 28.8 PG (ref 27–31)
MCHC RBC AUTO-ENTMCNC: 30.8 G/DL (ref 32–36)
MCV RBC AUTO: 94 FL (ref 82–98)
MONOCYTES # BLD AUTO: 1.4 K/UL (ref 0.3–1)
MONOCYTES NFR BLD: 9.7 % (ref 4–15)
NEUTROPHILS # BLD AUTO: 11.2 K/UL (ref 1.8–7.7)
NEUTROPHILS NFR BLD: 76.8 % (ref 38–73)
NRBC BLD-RTO: 0 /100 WBC
PLATELET # BLD AUTO: 373 K/UL (ref 150–450)
PMV BLD AUTO: 10.1 FL (ref 9.2–12.9)
POTASSIUM SERPL-SCNC: 4.9 MMOL/L (ref 3.5–5.1)
PROT SERPL-MCNC: 6.5 G/DL (ref 6–8.4)
PROTHROMBIN TIME: 14.7 SEC (ref 9–12.5)
RBC # BLD AUTO: 2.67 M/UL (ref 4–5.4)
SODIUM SERPL-SCNC: 140 MMOL/L (ref 136–145)
WBC # BLD AUTO: 14.57 K/UL (ref 3.9–12.7)

## 2021-06-17 PROCEDURE — 85025 COMPLETE CBC W/AUTO DIFF WBC: CPT | Performed by: STUDENT IN AN ORGANIZED HEALTH CARE EDUCATION/TRAINING PROGRAM

## 2021-06-17 PROCEDURE — 83690 ASSAY OF LIPASE: CPT | Performed by: STUDENT IN AN ORGANIZED HEALTH CARE EDUCATION/TRAINING PROGRAM

## 2021-06-17 PROCEDURE — 71045 X-RAY EXAM CHEST 1 VIEW: CPT | Mod: 26,76,, | Performed by: RADIOLOGY

## 2021-06-17 PROCEDURE — 99285 EMERGENCY DEPT VISIT HI MDM: CPT | Mod: ,,, | Performed by: EMERGENCY MEDICINE

## 2021-06-17 PROCEDURE — 83605 ASSAY OF LACTIC ACID: CPT | Performed by: STUDENT IN AN ORGANIZED HEALTH CARE EDUCATION/TRAINING PROGRAM

## 2021-06-17 PROCEDURE — 99285 EMERGENCY DEPT VISIT HI MDM: CPT | Mod: 25

## 2021-06-17 PROCEDURE — 81001 URINALYSIS AUTO W/SCOPE: CPT | Performed by: STUDENT IN AN ORGANIZED HEALTH CARE EDUCATION/TRAINING PROGRAM

## 2021-06-17 PROCEDURE — 99285 PR EMERGENCY DEPT VISIT,LEVEL V: ICD-10-PCS | Mod: ,,, | Performed by: EMERGENCY MEDICINE

## 2021-06-17 PROCEDURE — 96375 TX/PRO/DX INJ NEW DRUG ADDON: CPT

## 2021-06-17 PROCEDURE — 82962 GLUCOSE BLOOD TEST: CPT

## 2021-06-17 PROCEDURE — 71045 XR CHEST 1 VIEW: ICD-10-PCS | Mod: 26,76,, | Performed by: RADIOLOGY

## 2021-06-17 PROCEDURE — 80053 COMPREHEN METABOLIC PANEL: CPT | Performed by: STUDENT IN AN ORGANIZED HEALTH CARE EDUCATION/TRAINING PROGRAM

## 2021-06-17 PROCEDURE — 83735 ASSAY OF MAGNESIUM: CPT | Performed by: STUDENT IN AN ORGANIZED HEALTH CARE EDUCATION/TRAINING PROGRAM

## 2021-06-17 PROCEDURE — 83880 ASSAY OF NATRIURETIC PEPTIDE: CPT | Performed by: STUDENT IN AN ORGANIZED HEALTH CARE EDUCATION/TRAINING PROGRAM

## 2021-06-17 PROCEDURE — 85610 PROTHROMBIN TIME: CPT | Performed by: STUDENT IN AN ORGANIZED HEALTH CARE EDUCATION/TRAINING PROGRAM

## 2021-06-17 RX ORDER — MORPHINE SULFATE 4 MG/ML
4 INJECTION, SOLUTION INTRAMUSCULAR; INTRAVENOUS
Status: COMPLETED | OUTPATIENT
Start: 2021-06-18 | End: 2021-06-17

## 2021-06-17 RX ADMIN — MORPHINE SULFATE 4 MG: 4 INJECTION INTRAVENOUS at 11:06

## 2021-06-18 PROBLEM — I25.810 CORONARY ARTERY DISEASE INVOLVING CORONARY BYPASS GRAFT OF NATIVE HEART WITHOUT ANGINA PECTORIS: Chronic | Status: ACTIVE | Noted: 2020-01-02

## 2021-06-18 PROBLEM — I10 ESSENTIAL HYPERTENSION: Chronic | Status: ACTIVE | Noted: 2018-05-05

## 2021-06-18 PROBLEM — I48.0 PAROXYSMAL ATRIAL FIBRILLATION: Chronic | Status: ACTIVE | Noted: 2020-01-28

## 2021-06-18 PROBLEM — Z79.4 TYPE 2 DIABETES MELLITUS WITHOUT COMPLICATION, WITH LONG-TERM CURRENT USE OF INSULIN: Chronic | Status: ACTIVE | Noted: 2018-05-05

## 2021-06-18 PROBLEM — E78.2 MIXED HYPERLIPIDEMIA: Chronic | Status: ACTIVE | Noted: 2019-12-13

## 2021-06-18 PROBLEM — R60.1 ANASARCA: Status: ACTIVE | Noted: 2021-06-18

## 2021-06-18 PROBLEM — R41.82 ALTERED MENTAL STATUS: Status: RESOLVED | Noted: 2021-05-25 | Resolved: 2021-06-18

## 2021-06-18 PROBLEM — T81.49XA WOUND INFECTION AFTER SURGERY: Status: ACTIVE | Noted: 2021-06-18

## 2021-06-18 PROBLEM — I50.32 CHRONIC DIASTOLIC HEART FAILURE: Chronic | Status: ACTIVE | Noted: 2019-12-08

## 2021-06-18 PROBLEM — E11.9 TYPE 2 DIABETES MELLITUS WITHOUT COMPLICATION, WITH LONG-TERM CURRENT USE OF INSULIN: Chronic | Status: ACTIVE | Noted: 2018-05-05

## 2021-06-18 PROBLEM — N18.30 STAGE 3 CHRONIC KIDNEY DISEASE: Chronic | Status: ACTIVE | Noted: 2020-07-18

## 2021-06-18 PROBLEM — G93.40 ENCEPHALOPATHY: Status: RESOLVED | Noted: 2021-05-20 | Resolved: 2021-06-18

## 2021-06-18 LAB
ABO + RH BLD: NORMAL
ALBUMIN SERPL BCP-MCNC: 1.4 G/DL (ref 3.5–5.2)
ALP SERPL-CCNC: 148 U/L (ref 55–135)
ALT SERPL W/O P-5'-P-CCNC: 11 U/L (ref 10–44)
ANION GAP SERPL CALC-SCNC: 6 MMOL/L (ref 8–16)
AST SERPL-CCNC: 19 U/L (ref 10–40)
BACTERIA #/AREA URNS AUTO: ABNORMAL /HPF
BASOPHILS # BLD AUTO: 0.05 K/UL (ref 0–0.2)
BASOPHILS NFR BLD: 0.4 % (ref 0–1.9)
BILIRUB SERPL-MCNC: 0.5 MG/DL (ref 0.1–1)
BILIRUB UR QL STRIP: NEGATIVE
BLD GP AB SCN CELLS X3 SERPL QL: NORMAL
BUN SERPL-MCNC: 36 MG/DL (ref 6–20)
CALCIUM SERPL-MCNC: 8 MG/DL (ref 8.7–10.5)
CHLORIDE SERPL-SCNC: 114 MMOL/L (ref 95–110)
CLARITY UR REFRACT.AUTO: CLEAR
CO2 SERPL-SCNC: 20 MMOL/L (ref 23–29)
COLOR UR AUTO: YELLOW
CREAT SERPL-MCNC: 1.1 MG/DL (ref 0.5–1.4)
DIFFERENTIAL METHOD: ABNORMAL
EOSINOPHIL # BLD AUTO: 0.2 K/UL (ref 0–0.5)
EOSINOPHIL NFR BLD: 1.6 % (ref 0–8)
ERYTHROCYTE [DISTWIDTH] IN BLOOD BY AUTOMATED COUNT: 20.1 % (ref 11.5–14.5)
EST. GFR  (AFRICAN AMERICAN): >60 ML/MIN/1.73 M^2
EST. GFR  (NON AFRICAN AMERICAN): 57.1 ML/MIN/1.73 M^2
ESTIMATED AVG GLUCOSE: 123 MG/DL (ref 68–131)
FERRITIN SERPL-MCNC: 1646 NG/ML (ref 20–300)
FOLATE SERPL-MCNC: 6.7 NG/ML (ref 4–24)
GLUCOSE SERPL-MCNC: 100 MG/DL (ref 70–110)
GLUCOSE UR QL STRIP: NEGATIVE
HBA1C MFR BLD: 5.9 % (ref 4–5.6)
HCT VFR BLD AUTO: 23.2 % (ref 37–48.5)
HGB BLD-MCNC: 7.1 G/DL (ref 12–16)
HGB UR QL STRIP: ABNORMAL
HYALINE CASTS UR QL AUTO: 0 /LPF
IMM GRANULOCYTES # BLD AUTO: 0.15 K/UL (ref 0–0.04)
IMM GRANULOCYTES NFR BLD AUTO: 1.2 % (ref 0–0.5)
IRON SERPL-MCNC: 47 UG/DL (ref 30–160)
KETONES UR QL STRIP: NEGATIVE
LEUKOCYTE ESTERASE UR QL STRIP: ABNORMAL
LYMPHOCYTES # BLD AUTO: 1.6 K/UL (ref 1–4.8)
LYMPHOCYTES NFR BLD: 12.9 % (ref 18–48)
MAGNESIUM SERPL-MCNC: 1.5 MG/DL (ref 1.6–2.6)
MCH RBC QN AUTO: 28.9 PG (ref 27–31)
MCHC RBC AUTO-ENTMCNC: 30.6 G/DL (ref 32–36)
MCV RBC AUTO: 94 FL (ref 82–98)
MICROSCOPIC COMMENT: ABNORMAL
MONOCYTES # BLD AUTO: 1.1 K/UL (ref 0.3–1)
MONOCYTES NFR BLD: 8.7 % (ref 4–15)
NEUTROPHILS # BLD AUTO: 9.4 K/UL (ref 1.8–7.7)
NEUTROPHILS NFR BLD: 75.2 % (ref 38–73)
NITRITE UR QL STRIP: NEGATIVE
NON-SQ EPI CELLS #/AREA URNS AUTO: <1 /HPF
NRBC BLD-RTO: 0 /100 WBC
PH UR STRIP: 6 [PH] (ref 5–8)
PHOSPHATE SERPL-MCNC: 3.5 MG/DL (ref 2.7–4.5)
PLATELET # BLD AUTO: 320 K/UL (ref 150–450)
PMV BLD AUTO: 10.1 FL (ref 9.2–12.9)
POCT GLUCOSE: 105 MG/DL (ref 70–110)
POCT GLUCOSE: 112 MG/DL (ref 70–110)
POCT GLUCOSE: 123 MG/DL (ref 70–110)
POCT GLUCOSE: 155 MG/DL (ref 70–110)
POCT GLUCOSE: 156 MG/DL (ref 70–110)
POCT GLUCOSE: 69 MG/DL (ref 70–110)
POTASSIUM SERPL-SCNC: 4.9 MMOL/L (ref 3.5–5.1)
PROT SERPL-MCNC: 5.9 G/DL (ref 6–8.4)
PROT UR QL STRIP: ABNORMAL
RBC # BLD AUTO: 2.46 M/UL (ref 4–5.4)
RBC #/AREA URNS AUTO: 3 /HPF (ref 0–4)
SATURATED IRON: 89 % (ref 20–50)
SODIUM SERPL-SCNC: 140 MMOL/L (ref 136–145)
SP GR UR STRIP: 1.01 (ref 1–1.03)
TOTAL IRON BINDING CAPACITY: 53 UG/DL (ref 250–450)
TRANSFERRIN SERPL-MCNC: 36 MG/DL (ref 200–375)
TRANSFERRIN SERPL-MCNC: 36 MG/DL (ref 200–375)
URN SPEC COLLECT METH UR: ABNORMAL
VIT B12 SERPL-MCNC: 806 PG/ML (ref 210–950)
WBC # BLD AUTO: 12.45 K/UL (ref 3.9–12.7)
WBC #/AREA URNS AUTO: 3 /HPF (ref 0–5)
YEAST UR QL AUTO: ABNORMAL

## 2021-06-18 PROCEDURE — 25000003 PHARM REV CODE 250: Performed by: PHYSICIAN ASSISTANT

## 2021-06-18 PROCEDURE — 36415 COLL VENOUS BLD VENIPUNCTURE: CPT | Performed by: PHYSICIAN ASSISTANT

## 2021-06-18 PROCEDURE — 63600175 PHARM REV CODE 636 W HCPCS: Performed by: PHYSICIAN ASSISTANT

## 2021-06-18 PROCEDURE — 96365 THER/PROPH/DIAG IV INF INIT: CPT

## 2021-06-18 PROCEDURE — 83036 HEMOGLOBIN GLYCOSYLATED A1C: CPT | Performed by: PHYSICIAN ASSISTANT

## 2021-06-18 PROCEDURE — 82607 VITAMIN B-12: CPT | Performed by: PHYSICIAN ASSISTANT

## 2021-06-18 PROCEDURE — G0378 HOSPITAL OBSERVATION PER HR: HCPCS

## 2021-06-18 PROCEDURE — 83540 ASSAY OF IRON: CPT | Performed by: PHYSICIAN ASSISTANT

## 2021-06-18 PROCEDURE — P9047 ALBUMIN (HUMAN), 25%, 50ML: HCPCS | Mod: JG | Performed by: STUDENT IN AN ORGANIZED HEALTH CARE EDUCATION/TRAINING PROGRAM

## 2021-06-18 PROCEDURE — 86900 BLOOD TYPING SEROLOGIC ABO: CPT | Performed by: PHYSICIAN ASSISTANT

## 2021-06-18 PROCEDURE — 63600175 PHARM REV CODE 636 W HCPCS: Mod: JG | Performed by: STUDENT IN AN ORGANIZED HEALTH CARE EDUCATION/TRAINING PROGRAM

## 2021-06-18 PROCEDURE — 96372 THER/PROPH/DIAG INJ SC/IM: CPT | Mod: 59

## 2021-06-18 PROCEDURE — 99220 PR INITIAL OBSERVATION CARE,LEVL III: ICD-10-PCS | Mod: ,,, | Performed by: PHYSICIAN ASSISTANT

## 2021-06-18 PROCEDURE — 87040 BLOOD CULTURE FOR BACTERIA: CPT | Performed by: PHYSICIAN ASSISTANT

## 2021-06-18 PROCEDURE — 99024 PR POST-OP FOLLOW-UP VISIT: ICD-10-PCS | Mod: ,,, | Performed by: SURGERY

## 2021-06-18 PROCEDURE — C9399 UNCLASSIFIED DRUGS OR BIOLOG: HCPCS | Performed by: PHYSICIAN ASSISTANT

## 2021-06-18 PROCEDURE — 84100 ASSAY OF PHOSPHORUS: CPT | Performed by: PHYSICIAN ASSISTANT

## 2021-06-18 PROCEDURE — 99024 POSTOP FOLLOW-UP VISIT: CPT | Mod: ,,, | Performed by: SURGERY

## 2021-06-18 PROCEDURE — 83735 ASSAY OF MAGNESIUM: CPT | Performed by: PHYSICIAN ASSISTANT

## 2021-06-18 PROCEDURE — 82746 ASSAY OF FOLIC ACID SERUM: CPT | Performed by: PHYSICIAN ASSISTANT

## 2021-06-18 PROCEDURE — 97530 THERAPEUTIC ACTIVITIES: CPT

## 2021-06-18 PROCEDURE — 80053 COMPREHEN METABOLIC PANEL: CPT | Performed by: PHYSICIAN ASSISTANT

## 2021-06-18 PROCEDURE — 96375 TX/PRO/DX INJ NEW DRUG ADDON: CPT

## 2021-06-18 PROCEDURE — 96372 THER/PROPH/DIAG INJ SC/IM: CPT | Mod: 59 | Performed by: EMERGENCY MEDICINE

## 2021-06-18 PROCEDURE — 86920 COMPATIBILITY TEST SPIN: CPT | Performed by: HOSPITALIST

## 2021-06-18 PROCEDURE — 85025 COMPLETE CBC W/AUTO DIFF WBC: CPT | Performed by: PHYSICIAN ASSISTANT

## 2021-06-18 PROCEDURE — 97165 OT EVAL LOW COMPLEX 30 MIN: CPT

## 2021-06-18 PROCEDURE — 25000003 PHARM REV CODE 250: Performed by: STUDENT IN AN ORGANIZED HEALTH CARE EDUCATION/TRAINING PROGRAM

## 2021-06-18 PROCEDURE — 82728 ASSAY OF FERRITIN: CPT | Performed by: PHYSICIAN ASSISTANT

## 2021-06-18 PROCEDURE — 25000242 PHARM REV CODE 250 ALT 637 W/ HCPCS: Performed by: PHYSICIAN ASSISTANT

## 2021-06-18 PROCEDURE — 96376 TX/PRO/DX INJ SAME DRUG ADON: CPT | Performed by: EMERGENCY MEDICINE

## 2021-06-18 PROCEDURE — 99220 PR INITIAL OBSERVATION CARE,LEVL III: CPT | Mod: ,,, | Performed by: PHYSICIAN ASSISTANT

## 2021-06-18 RX ORDER — IBUPROFEN 200 MG
16 TABLET ORAL
Status: DISCONTINUED | OUTPATIENT
Start: 2021-06-18 | End: 2021-07-01 | Stop reason: HOSPADM

## 2021-06-18 RX ORDER — ACETAMINOPHEN 325 MG/1
650 TABLET ORAL EVERY 4 HOURS PRN
Status: DISCONTINUED | OUTPATIENT
Start: 2021-06-18 | End: 2021-06-19

## 2021-06-18 RX ORDER — OXYCODONE HYDROCHLORIDE 5 MG/1
5 TABLET ORAL EVERY 4 HOURS PRN
Status: DISCONTINUED | OUTPATIENT
Start: 2021-06-18 | End: 2021-07-01 | Stop reason: HOSPADM

## 2021-06-18 RX ORDER — ALLOPURINOL 100 MG/1
100 TABLET ORAL DAILY
Status: DISCONTINUED | OUTPATIENT
Start: 2021-06-18 | End: 2021-07-01 | Stop reason: HOSPADM

## 2021-06-18 RX ORDER — INSULIN ASPART 100 [IU]/ML
0-5 INJECTION, SOLUTION INTRAVENOUS; SUBCUTANEOUS
Status: DISCONTINUED | OUTPATIENT
Start: 2021-06-18 | End: 2021-07-01 | Stop reason: HOSPADM

## 2021-06-18 RX ORDER — IBUPROFEN 200 MG
24 TABLET ORAL
Status: DISCONTINUED | OUTPATIENT
Start: 2021-06-18 | End: 2021-07-01 | Stop reason: HOSPADM

## 2021-06-18 RX ORDER — LANOLIN ALCOHOL/MO/W.PET/CERES
400 CREAM (GRAM) TOPICAL ONCE
Status: COMPLETED | OUTPATIENT
Start: 2021-06-18 | End: 2021-06-18

## 2021-06-18 RX ORDER — MIRTAZAPINE 7.5 MG/1
7.5 TABLET, FILM COATED ORAL NIGHTLY
Status: DISCONTINUED | OUTPATIENT
Start: 2021-06-18 | End: 2021-07-01 | Stop reason: HOSPADM

## 2021-06-18 RX ORDER — ESCITALOPRAM OXALATE 10 MG/1
10 TABLET ORAL DAILY
Status: DISCONTINUED | OUTPATIENT
Start: 2021-06-18 | End: 2021-07-01 | Stop reason: HOSPADM

## 2021-06-18 RX ORDER — TALC
6 POWDER (GRAM) TOPICAL NIGHTLY PRN
Status: DISCONTINUED | OUTPATIENT
Start: 2021-06-18 | End: 2021-07-01 | Stop reason: HOSPADM

## 2021-06-18 RX ORDER — OXYCODONE HYDROCHLORIDE 10 MG/1
10 TABLET ORAL EVERY 6 HOURS PRN
Status: DISCONTINUED | OUTPATIENT
Start: 2021-06-18 | End: 2021-07-01 | Stop reason: HOSPADM

## 2021-06-18 RX ORDER — IPRATROPIUM BROMIDE AND ALBUTEROL SULFATE 2.5; .5 MG/3ML; MG/3ML
3 SOLUTION RESPIRATORY (INHALATION) EVERY 4 HOURS PRN
Status: DISCONTINUED | OUTPATIENT
Start: 2021-06-18 | End: 2021-07-01 | Stop reason: HOSPADM

## 2021-06-18 RX ORDER — PROMETHAZINE HYDROCHLORIDE 25 MG/1
25 TABLET ORAL EVERY 6 HOURS PRN
Status: DISCONTINUED | OUTPATIENT
Start: 2021-06-18 | End: 2021-07-01 | Stop reason: HOSPADM

## 2021-06-18 RX ORDER — FUROSEMIDE 10 MG/ML
40 INJECTION INTRAMUSCULAR; INTRAVENOUS 3 TIMES DAILY
Status: DISCONTINUED | OUTPATIENT
Start: 2021-06-18 | End: 2021-06-23

## 2021-06-18 RX ORDER — INSULIN ASPART 100 [IU]/ML
2 INJECTION, SOLUTION INTRAVENOUS; SUBCUTANEOUS
Status: DISCONTINUED | OUTPATIENT
Start: 2021-06-18 | End: 2021-06-24

## 2021-06-18 RX ORDER — ONDANSETRON 8 MG/1
8 TABLET, ORALLY DISINTEGRATING ORAL EVERY 8 HOURS PRN
Status: DISCONTINUED | OUTPATIENT
Start: 2021-06-18 | End: 2021-07-01 | Stop reason: HOSPADM

## 2021-06-18 RX ORDER — MEGESTROL ACETATE 20 MG/1
20 TABLET ORAL DAILY
Status: DISCONTINUED | OUTPATIENT
Start: 2021-06-18 | End: 2021-06-19

## 2021-06-18 RX ORDER — LEVOFLOXACIN 750 MG/1
750 TABLET ORAL DAILY
Status: DISCONTINUED | OUTPATIENT
Start: 2021-06-18 | End: 2021-07-01 | Stop reason: HOSPADM

## 2021-06-18 RX ORDER — SODIUM CHLORIDE 0.9 % (FLUSH) 0.9 %
5 SYRINGE (ML) INJECTION
Status: DISCONTINUED | OUTPATIENT
Start: 2021-06-18 | End: 2021-07-01 | Stop reason: HOSPADM

## 2021-06-18 RX ORDER — METRONIDAZOLE 500 MG/1
500 TABLET ORAL EVERY 8 HOURS
Status: DISCONTINUED | OUTPATIENT
Start: 2021-06-18 | End: 2021-07-01 | Stop reason: HOSPADM

## 2021-06-18 RX ORDER — SODIUM BICARBONATE 650 MG/1
650 TABLET ORAL 2 TIMES DAILY
Status: DISCONTINUED | OUTPATIENT
Start: 2021-06-18 | End: 2021-06-25

## 2021-06-18 RX ORDER — GLUCAGON 1 MG
1 KIT INJECTION
Status: DISCONTINUED | OUTPATIENT
Start: 2021-06-18 | End: 2021-07-01 | Stop reason: HOSPADM

## 2021-06-18 RX ORDER — ALBUMIN HUMAN 250 G/1000ML
12.5 SOLUTION INTRAVENOUS ONCE
Status: COMPLETED | OUTPATIENT
Start: 2021-06-18 | End: 2021-06-18

## 2021-06-18 RX ORDER — AMOXICILLIN 250 MG
1 CAPSULE ORAL 2 TIMES DAILY
Status: DISCONTINUED | OUTPATIENT
Start: 2021-06-18 | End: 2021-06-18

## 2021-06-18 RX ORDER — ATORVASTATIN CALCIUM 40 MG/1
80 TABLET, FILM COATED ORAL NIGHTLY
Status: DISCONTINUED | OUTPATIENT
Start: 2021-06-18 | End: 2021-07-01 | Stop reason: HOSPADM

## 2021-06-18 RX ADMIN — MAGNESIUM OXIDE TAB 400 MG (241.3 MG ELEMENTAL MG) 400 MG: 400 (241.3 MG) TAB at 01:06

## 2021-06-18 RX ADMIN — MIRTAZAPINE 7.5 MG: 7.5 TABLET ORAL at 09:06

## 2021-06-18 RX ADMIN — FUROSEMIDE 40 MG: 10 INJECTION, SOLUTION INTRAMUSCULAR; INTRAVENOUS at 02:06

## 2021-06-18 RX ADMIN — PSYLLIUM HUSK 1 PACKET: 3.4 POWDER ORAL at 10:06

## 2021-06-18 RX ADMIN — INSULIN ASPART 2 UNITS: 100 INJECTION, SOLUTION INTRAVENOUS; SUBCUTANEOUS at 05:06

## 2021-06-18 RX ADMIN — INSULIN ASPART 2 UNITS: 100 INJECTION, SOLUTION INTRAVENOUS; SUBCUTANEOUS at 11:06

## 2021-06-18 RX ADMIN — FUROSEMIDE 40 MG: 10 INJECTION, SOLUTION INTRAMUSCULAR; INTRAVENOUS at 09:06

## 2021-06-18 RX ADMIN — ALLOPURINOL 100 MG: 100 TABLET ORAL at 10:06

## 2021-06-18 RX ADMIN — MEGESTROL ACETATE 20 MG: 20 TABLET ORAL at 11:06

## 2021-06-18 RX ADMIN — ATORVASTATIN CALCIUM 80 MG: 40 TABLET, FILM COATED ORAL at 02:06

## 2021-06-18 RX ADMIN — OXYCODONE 5 MG: 5 TABLET ORAL at 03:06

## 2021-06-18 RX ADMIN — METRONIDAZOLE 500 MG: 500 TABLET ORAL at 09:06

## 2021-06-18 RX ADMIN — FUROSEMIDE 40 MG: 10 INJECTION, SOLUTION INTRAMUSCULAR; INTRAVENOUS at 10:06

## 2021-06-18 RX ADMIN — INSULIN ASPART 2 UNITS: 100 INJECTION, SOLUTION INTRAVENOUS; SUBCUTANEOUS at 08:06

## 2021-06-18 RX ADMIN — ALBUMIN (HUMAN) 12.5 G: 12.5 SOLUTION INTRAVENOUS at 02:06

## 2021-06-18 RX ADMIN — INSULIN ASPART 2 UNITS: 100 INJECTION, SOLUTION INTRAVENOUS; SUBCUTANEOUS at 10:06

## 2021-06-18 RX ADMIN — VANCOMYCIN HYDROCHLORIDE 125 MG: KIT at 11:06

## 2021-06-18 RX ADMIN — METRONIDAZOLE 500 MG: 500 TABLET ORAL at 06:06

## 2021-06-18 RX ADMIN — INSULIN DETEMIR 8 UNITS: 100 INJECTION, SOLUTION SUBCUTANEOUS at 10:06

## 2021-06-18 RX ADMIN — OXYCODONE 5 MG: 5 TABLET ORAL at 11:06

## 2021-06-18 RX ADMIN — SODIUM BICARBONATE 650 MG TABLET 650 MG: at 10:06

## 2021-06-18 RX ADMIN — ATORVASTATIN CALCIUM 80 MG: 40 TABLET, FILM COATED ORAL at 09:06

## 2021-06-18 RX ADMIN — SODIUM BICARBONATE 650 MG TABLET 650 MG: at 09:06

## 2021-06-18 RX ADMIN — LEVOFLOXACIN 750 MG: 750 TABLET, FILM COATED ORAL at 10:06

## 2021-06-18 RX ADMIN — ESCITALOPRAM OXALATE 10 MG: 10 TABLET ORAL at 10:06

## 2021-06-18 RX ADMIN — MIRTAZAPINE 7.5 MG: 7.5 TABLET ORAL at 03:06

## 2021-06-18 RX ADMIN — METRONIDAZOLE 500 MG: 500 TABLET ORAL at 02:06

## 2021-06-19 PROBLEM — R80.9 PROTEINURIA: Status: ACTIVE | Noted: 2021-06-19

## 2021-06-19 PROBLEM — E88.09 HYPOALBUMINEMIA: Status: ACTIVE | Noted: 2021-06-19

## 2021-06-19 PROBLEM — I50.33 ACUTE ON CHRONIC DIASTOLIC CHF (CONGESTIVE HEART FAILURE): Status: ACTIVE | Noted: 2019-12-08

## 2021-06-19 LAB
ALBUMIN SERPL BCP-MCNC: 1.2 G/DL (ref 3.5–5.2)
ALP SERPL-CCNC: 159 U/L (ref 55–135)
ALT SERPL W/O P-5'-P-CCNC: 10 U/L (ref 10–44)
ANION GAP SERPL CALC-SCNC: 6 MMOL/L (ref 8–16)
AST SERPL-CCNC: 23 U/L (ref 10–40)
BASOPHILS # BLD AUTO: 0.06 K/UL (ref 0–0.2)
BASOPHILS NFR BLD: 0.5 % (ref 0–1.9)
BILIRUB SERPL-MCNC: 0.5 MG/DL (ref 0.1–1)
BUN SERPL-MCNC: 32 MG/DL (ref 6–20)
CALCIUM SERPL-MCNC: 7.9 MG/DL (ref 8.7–10.5)
CHLORIDE SERPL-SCNC: 114 MMOL/L (ref 95–110)
CO2 SERPL-SCNC: 20 MMOL/L (ref 23–29)
CREAT SERPL-MCNC: 1 MG/DL (ref 0.5–1.4)
DIFFERENTIAL METHOD: ABNORMAL
EOSINOPHIL # BLD AUTO: 0.2 K/UL (ref 0–0.5)
EOSINOPHIL NFR BLD: 1.4 % (ref 0–8)
ERYTHROCYTE [DISTWIDTH] IN BLOOD BY AUTOMATED COUNT: 20.4 % (ref 11.5–14.5)
EST. GFR  (AFRICAN AMERICAN): >60 ML/MIN/1.73 M^2
EST. GFR  (NON AFRICAN AMERICAN): >60 ML/MIN/1.73 M^2
GLUCOSE SERPL-MCNC: 70 MG/DL (ref 70–110)
HCT VFR BLD AUTO: 22.2 % (ref 37–48.5)
HGB BLD-MCNC: 6.8 G/DL (ref 12–16)
IMM GRANULOCYTES # BLD AUTO: 0.09 K/UL (ref 0–0.04)
IMM GRANULOCYTES NFR BLD AUTO: 0.7 % (ref 0–0.5)
LYMPHOCYTES # BLD AUTO: 1.5 K/UL (ref 1–4.8)
LYMPHOCYTES NFR BLD: 12.4 % (ref 18–48)
MAGNESIUM SERPL-MCNC: 1.3 MG/DL (ref 1.6–2.6)
MCH RBC QN AUTO: 28.8 PG (ref 27–31)
MCHC RBC AUTO-ENTMCNC: 30.6 G/DL (ref 32–36)
MCV RBC AUTO: 94 FL (ref 82–98)
MONOCYTES # BLD AUTO: 1.1 K/UL (ref 0.3–1)
MONOCYTES NFR BLD: 8.7 % (ref 4–15)
NEUTROPHILS # BLD AUTO: 9.4 K/UL (ref 1.8–7.7)
NEUTROPHILS NFR BLD: 76.3 % (ref 38–73)
NRBC BLD-RTO: 0 /100 WBC
PLATELET # BLD AUTO: 336 K/UL (ref 150–450)
PMV BLD AUTO: 10 FL (ref 9.2–12.9)
POCT GLUCOSE: 135 MG/DL (ref 70–110)
POCT GLUCOSE: 153 MG/DL (ref 70–110)
POCT GLUCOSE: 91 MG/DL (ref 70–110)
POCT GLUCOSE: 92 MG/DL (ref 70–110)
POTASSIUM SERPL-SCNC: 4.8 MMOL/L (ref 3.5–5.1)
PROT SERPL-MCNC: 5.5 G/DL (ref 6–8.4)
RBC # BLD AUTO: 2.36 M/UL (ref 4–5.4)
SODIUM SERPL-SCNC: 140 MMOL/L (ref 136–145)
WBC # BLD AUTO: 12.3 K/UL (ref 3.9–12.7)

## 2021-06-19 PROCEDURE — G0378 HOSPITAL OBSERVATION PER HR: HCPCS

## 2021-06-19 PROCEDURE — 96366 THER/PROPH/DIAG IV INF ADDON: CPT | Performed by: EMERGENCY MEDICINE

## 2021-06-19 PROCEDURE — 36415 COLL VENOUS BLD VENIPUNCTURE: CPT | Performed by: PHYSICIAN ASSISTANT

## 2021-06-19 PROCEDURE — 96376 TX/PRO/DX INJ SAME DRUG ADON: CPT | Performed by: EMERGENCY MEDICINE

## 2021-06-19 PROCEDURE — 83735 ASSAY OF MAGNESIUM: CPT | Performed by: PHYSICIAN ASSISTANT

## 2021-06-19 PROCEDURE — 96372 THER/PROPH/DIAG INJ SC/IM: CPT | Mod: 59 | Performed by: EMERGENCY MEDICINE

## 2021-06-19 PROCEDURE — 99226 PR SUBSEQUENT OBSERVATION CARE,LEVEL III: CPT | Mod: ,,, | Performed by: HOSPITALIST

## 2021-06-19 PROCEDURE — 96367 TX/PROPH/DG ADDL SEQ IV INF: CPT | Performed by: EMERGENCY MEDICINE

## 2021-06-19 PROCEDURE — 63600175 PHARM REV CODE 636 W HCPCS: Performed by: PHYSICIAN ASSISTANT

## 2021-06-19 PROCEDURE — 99226 PR SUBSEQUENT OBSERVATION CARE,LEVEL III: ICD-10-PCS | Mod: ,,, | Performed by: HOSPITALIST

## 2021-06-19 PROCEDURE — 96374 THER/PROPH/DIAG INJ IV PUSH: CPT | Mod: 59 | Performed by: EMERGENCY MEDICINE

## 2021-06-19 PROCEDURE — P9021 RED BLOOD CELLS UNIT: HCPCS | Performed by: HOSPITALIST

## 2021-06-19 PROCEDURE — 80053 COMPREHEN METABOLIC PANEL: CPT | Performed by: PHYSICIAN ASSISTANT

## 2021-06-19 PROCEDURE — 85025 COMPLETE CBC W/AUTO DIFF WBC: CPT | Performed by: PHYSICIAN ASSISTANT

## 2021-06-19 PROCEDURE — 25000003 PHARM REV CODE 250: Performed by: PHYSICIAN ASSISTANT

## 2021-06-19 RX ORDER — HYDROCODONE BITARTRATE AND ACETAMINOPHEN 500; 5 MG/1; MG/1
TABLET ORAL
Status: DISCONTINUED | OUTPATIENT
Start: 2021-06-19 | End: 2021-07-01 | Stop reason: HOSPADM

## 2021-06-19 RX ORDER — ACETAMINOPHEN 500 MG
1000 TABLET ORAL EVERY 8 HOURS PRN
Status: DISCONTINUED | OUTPATIENT
Start: 2021-06-19 | End: 2021-07-01 | Stop reason: HOSPADM

## 2021-06-19 RX ORDER — MAGNESIUM SULFATE HEPTAHYDRATE 40 MG/ML
2 INJECTION, SOLUTION INTRAVENOUS ONCE
Status: COMPLETED | OUTPATIENT
Start: 2021-06-19 | End: 2021-06-19

## 2021-06-19 RX ADMIN — OXYCODONE 5 MG: 5 TABLET ORAL at 08:06

## 2021-06-19 RX ADMIN — MEGESTROL ACETATE 20 MG: 20 TABLET ORAL at 11:06

## 2021-06-19 RX ADMIN — MIRTAZAPINE 7.5 MG: 7.5 TABLET ORAL at 08:06

## 2021-06-19 RX ADMIN — SODIUM BICARBONATE 650 MG TABLET 650 MG: at 08:06

## 2021-06-19 RX ADMIN — METRONIDAZOLE 500 MG: 500 TABLET ORAL at 08:06

## 2021-06-19 RX ADMIN — ESCITALOPRAM OXALATE 10 MG: 10 TABLET ORAL at 08:06

## 2021-06-19 RX ADMIN — METRONIDAZOLE 500 MG: 500 TABLET ORAL at 06:06

## 2021-06-19 RX ADMIN — MAGNESIUM SULFATE HEPTAHYDRATE 2 G: 40 INJECTION, SOLUTION INTRAVENOUS at 11:06

## 2021-06-19 RX ADMIN — OXYCODONE 5 MG: 5 TABLET ORAL at 03:06

## 2021-06-19 RX ADMIN — OXYCODONE 5 MG: 5 TABLET ORAL at 04:06

## 2021-06-19 RX ADMIN — MELATONIN TAB 3 MG 6 MG: 3 TAB at 12:06

## 2021-06-19 RX ADMIN — FUROSEMIDE 40 MG: 10 INJECTION, SOLUTION INTRAMUSCULAR; INTRAVENOUS at 08:06

## 2021-06-19 RX ADMIN — FUROSEMIDE 40 MG: 10 INJECTION, SOLUTION INTRAMUSCULAR; INTRAVENOUS at 03:06

## 2021-06-19 RX ADMIN — INSULIN ASPART 2 UNITS: 100 INJECTION, SOLUTION INTRAVENOUS; SUBCUTANEOUS at 08:06

## 2021-06-19 RX ADMIN — ATORVASTATIN CALCIUM 80 MG: 40 TABLET, FILM COATED ORAL at 08:06

## 2021-06-19 RX ADMIN — OXYCODONE HYDROCHLORIDE 10 MG: 10 TABLET ORAL at 08:06

## 2021-06-19 RX ADMIN — INSULIN DETEMIR 8 UNITS: 100 INJECTION, SOLUTION SUBCUTANEOUS at 08:06

## 2021-06-19 RX ADMIN — MELATONIN TAB 3 MG 6 MG: 3 TAB at 11:06

## 2021-06-19 RX ADMIN — OXYCODONE 5 MG: 5 TABLET ORAL at 11:06

## 2021-06-19 RX ADMIN — LEVOFLOXACIN 750 MG: 750 TABLET, FILM COATED ORAL at 08:06

## 2021-06-19 RX ADMIN — INSULIN ASPART 2 UNITS: 100 INJECTION, SOLUTION INTRAVENOUS; SUBCUTANEOUS at 11:06

## 2021-06-19 RX ADMIN — METRONIDAZOLE 500 MG: 500 TABLET ORAL at 03:06

## 2021-06-19 RX ADMIN — OXYCODONE HYDROCHLORIDE 10 MG: 10 TABLET ORAL at 12:06

## 2021-06-19 RX ADMIN — ALLOPURINOL 100 MG: 100 TABLET ORAL at 08:06

## 2021-06-20 LAB
ALBUMIN SERPL BCP-MCNC: 1.2 G/DL (ref 3.5–5.2)
ALP SERPL-CCNC: 166 U/L (ref 55–135)
ALT SERPL W/O P-5'-P-CCNC: 10 U/L (ref 10–44)
ANION GAP SERPL CALC-SCNC: 10 MMOL/L (ref 8–16)
AST SERPL-CCNC: 29 U/L (ref 10–40)
BASOPHILS # BLD AUTO: 0.05 K/UL (ref 0–0.2)
BASOPHILS NFR BLD: 0.4 % (ref 0–1.9)
BILIRUB SERPL-MCNC: 0.8 MG/DL (ref 0.1–1)
BLD PROD TYP BPU: NORMAL
BLD PROD TYP BPU: NORMAL
BLOOD UNIT EXPIRATION DATE: NORMAL
BLOOD UNIT EXPIRATION DATE: NORMAL
BLOOD UNIT TYPE CODE: 5100
BLOOD UNIT TYPE CODE: 5100
BLOOD UNIT TYPE: NORMAL
BLOOD UNIT TYPE: NORMAL
BUN SERPL-MCNC: 29 MG/DL (ref 6–20)
CALCIUM SERPL-MCNC: 7.8 MG/DL (ref 8.7–10.5)
CHLORIDE SERPL-SCNC: 115 MMOL/L (ref 95–110)
CO2 SERPL-SCNC: 16 MMOL/L (ref 23–29)
CODING SYSTEM: NORMAL
CODING SYSTEM: NORMAL
CREAT SERPL-MCNC: 1.2 MG/DL (ref 0.5–1.4)
DIFFERENTIAL METHOD: ABNORMAL
DISPENSE STATUS: NORMAL
DISPENSE STATUS: NORMAL
EOSINOPHIL # BLD AUTO: 0.2 K/UL (ref 0–0.5)
EOSINOPHIL NFR BLD: 1.6 % (ref 0–8)
ERYTHROCYTE [DISTWIDTH] IN BLOOD BY AUTOMATED COUNT: 19 % (ref 11.5–14.5)
EST. GFR  (AFRICAN AMERICAN): 59.2 ML/MIN/1.73 M^2
EST. GFR  (NON AFRICAN AMERICAN): 51.4 ML/MIN/1.73 M^2
GLUCOSE SERPL-MCNC: 100 MG/DL (ref 70–110)
HCT VFR BLD AUTO: 22.3 % (ref 37–48.5)
HGB BLD-MCNC: 7.1 G/DL (ref 12–16)
IMM GRANULOCYTES # BLD AUTO: 0.07 K/UL (ref 0–0.04)
IMM GRANULOCYTES NFR BLD AUTO: 0.6 % (ref 0–0.5)
LYMPHOCYTES # BLD AUTO: 1.2 K/UL (ref 1–4.8)
LYMPHOCYTES NFR BLD: 9.5 % (ref 18–48)
MAGNESIUM SERPL-MCNC: 1.4 MG/DL (ref 1.6–2.6)
MCH RBC QN AUTO: 29.7 PG (ref 27–31)
MCHC RBC AUTO-ENTMCNC: 31.8 G/DL (ref 32–36)
MCV RBC AUTO: 93 FL (ref 82–98)
MONOCYTES # BLD AUTO: 1 K/UL (ref 0.3–1)
MONOCYTES NFR BLD: 8.2 % (ref 4–15)
NEUTROPHILS # BLD AUTO: 9.7 K/UL (ref 1.8–7.7)
NEUTROPHILS NFR BLD: 79.7 % (ref 38–73)
NRBC BLD-RTO: 0 /100 WBC
PLATELET # BLD AUTO: 275 K/UL (ref 150–450)
PMV BLD AUTO: 9.9 FL (ref 9.2–12.9)
POCT GLUCOSE: 114 MG/DL (ref 70–110)
POCT GLUCOSE: 118 MG/DL (ref 70–110)
POCT GLUCOSE: 150 MG/DL (ref 70–110)
POCT GLUCOSE: 98 MG/DL (ref 70–110)
POTASSIUM SERPL-SCNC: 5.5 MMOL/L (ref 3.5–5.1)
PROT SERPL-MCNC: 6.2 G/DL (ref 6–8.4)
RBC # BLD AUTO: 2.39 M/UL (ref 4–5.4)
SODIUM SERPL-SCNC: 141 MMOL/L (ref 136–145)
TRANS ERYTHROCYTES VOL PATIENT: NORMAL ML
TRANS ERYTHROCYTES VOL PATIENT: NORMAL ML
WBC # BLD AUTO: 12.16 K/UL (ref 3.9–12.7)

## 2021-06-20 PROCEDURE — 85025 COMPLETE CBC W/AUTO DIFF WBC: CPT | Performed by: HOSPITALIST

## 2021-06-20 PROCEDURE — 63600175 PHARM REV CODE 636 W HCPCS: Performed by: HOSPITALIST

## 2021-06-20 PROCEDURE — 36410 VNPNXR 3YR/> PHY/QHP DX/THER: CPT

## 2021-06-20 PROCEDURE — C1751 CATH, INF, PER/CENT/MIDLINE: HCPCS

## 2021-06-20 PROCEDURE — 96374 THER/PROPH/DIAG INJ IV PUSH: CPT | Mod: 59 | Performed by: EMERGENCY MEDICINE

## 2021-06-20 PROCEDURE — 36430 TRANSFUSION BLD/BLD COMPNT: CPT

## 2021-06-20 PROCEDURE — 99226 PR SUBSEQUENT OBSERVATION CARE,LEVEL III: CPT | Mod: ,,, | Performed by: HOSPITALIST

## 2021-06-20 PROCEDURE — 99215 PR OFFICE/OUTPT VISIT, EST, LEVL V, 40-54 MIN: ICD-10-PCS | Mod: ,,, | Performed by: INTERNAL MEDICINE

## 2021-06-20 PROCEDURE — 96376 TX/PRO/DX INJ SAME DRUG ADON: CPT | Mod: 59 | Performed by: EMERGENCY MEDICINE

## 2021-06-20 PROCEDURE — 83735 ASSAY OF MAGNESIUM: CPT | Performed by: PHYSICIAN ASSISTANT

## 2021-06-20 PROCEDURE — 99215 OFFICE O/P EST HI 40 MIN: CPT | Mod: ,,, | Performed by: INTERNAL MEDICINE

## 2021-06-20 PROCEDURE — P9021 RED BLOOD CELLS UNIT: HCPCS | Performed by: HOSPITALIST

## 2021-06-20 PROCEDURE — 36415 COLL VENOUS BLD VENIPUNCTURE: CPT | Performed by: PHYSICIAN ASSISTANT

## 2021-06-20 PROCEDURE — 76937 US GUIDE VASCULAR ACCESS: CPT

## 2021-06-20 PROCEDURE — 80053 COMPREHEN METABOLIC PANEL: CPT | Performed by: PHYSICIAN ASSISTANT

## 2021-06-20 PROCEDURE — 25000003 PHARM REV CODE 250: Performed by: PHYSICIAN ASSISTANT

## 2021-06-20 PROCEDURE — G0378 HOSPITAL OBSERVATION PER HR: HCPCS

## 2021-06-20 PROCEDURE — 63600175 PHARM REV CODE 636 W HCPCS: Performed by: PHYSICIAN ASSISTANT

## 2021-06-20 PROCEDURE — 96372 THER/PROPH/DIAG INJ SC/IM: CPT | Mod: 59 | Performed by: EMERGENCY MEDICINE

## 2021-06-20 PROCEDURE — 99226 PR SUBSEQUENT OBSERVATION CARE,LEVEL III: ICD-10-PCS | Mod: ,,, | Performed by: HOSPITALIST

## 2021-06-20 PROCEDURE — 96375 TX/PRO/DX INJ NEW DRUG ADDON: CPT | Mod: 59 | Performed by: EMERGENCY MEDICINE

## 2021-06-20 RX ORDER — MAGNESIUM SULFATE HEPTAHYDRATE 40 MG/ML
2 INJECTION, SOLUTION INTRAVENOUS ONCE
Status: COMPLETED | OUTPATIENT
Start: 2021-06-20 | End: 2021-06-20

## 2021-06-20 RX ORDER — TORSEMIDE 20 MG/1
20 TABLET ORAL ONCE
Status: DISCONTINUED | OUTPATIENT
Start: 2021-06-20 | End: 2021-06-20

## 2021-06-20 RX ADMIN — SODIUM BICARBONATE 650 MG TABLET 650 MG: at 09:06

## 2021-06-20 RX ADMIN — OXYCODONE HYDROCHLORIDE 10 MG: 10 TABLET ORAL at 08:06

## 2021-06-20 RX ADMIN — FUROSEMIDE 40 MG: 10 INJECTION, SOLUTION INTRAMUSCULAR; INTRAVENOUS at 11:06

## 2021-06-20 RX ADMIN — ALLOPURINOL 100 MG: 100 TABLET ORAL at 08:06

## 2021-06-20 RX ADMIN — INSULIN ASPART 2 UNITS: 100 INJECTION, SOLUTION INTRAVENOUS; SUBCUTANEOUS at 05:06

## 2021-06-20 RX ADMIN — FUROSEMIDE 40 MG: 10 INJECTION, SOLUTION INTRAMUSCULAR; INTRAVENOUS at 09:06

## 2021-06-20 RX ADMIN — FUROSEMIDE 40 MG: 10 INJECTION, SOLUTION INTRAMUSCULAR; INTRAVENOUS at 03:06

## 2021-06-20 RX ADMIN — OXYCODONE 5 MG: 5 TABLET ORAL at 01:06

## 2021-06-20 RX ADMIN — SODIUM BICARBONATE 650 MG TABLET 650 MG: at 08:06

## 2021-06-20 RX ADMIN — INSULIN DETEMIR 8 UNITS: 100 INJECTION, SOLUTION SUBCUTANEOUS at 08:06

## 2021-06-20 RX ADMIN — OXYCODONE HYDROCHLORIDE 10 MG: 10 TABLET ORAL at 09:06

## 2021-06-20 RX ADMIN — MAGNESIUM SULFATE HEPTAHYDRATE 2 G: 40 INJECTION, SOLUTION INTRAVENOUS at 07:06

## 2021-06-20 RX ADMIN — OXYCODONE HYDROCHLORIDE 10 MG: 10 TABLET ORAL at 01:06

## 2021-06-20 RX ADMIN — MIRTAZAPINE 7.5 MG: 7.5 TABLET ORAL at 09:06

## 2021-06-20 RX ADMIN — METRONIDAZOLE 500 MG: 500 TABLET ORAL at 09:06

## 2021-06-20 RX ADMIN — INSULIN ASPART 2 UNITS: 100 INJECTION, SOLUTION INTRAVENOUS; SUBCUTANEOUS at 12:06

## 2021-06-20 RX ADMIN — ATORVASTATIN CALCIUM 80 MG: 40 TABLET, FILM COATED ORAL at 09:06

## 2021-06-20 RX ADMIN — METRONIDAZOLE 500 MG: 500 TABLET ORAL at 05:06

## 2021-06-20 RX ADMIN — INSULIN ASPART 2 UNITS: 100 INJECTION, SOLUTION INTRAVENOUS; SUBCUTANEOUS at 08:06

## 2021-06-20 RX ADMIN — LEVOFLOXACIN 750 MG: 750 TABLET, FILM COATED ORAL at 08:06

## 2021-06-20 RX ADMIN — METRONIDAZOLE 500 MG: 500 TABLET ORAL at 01:06

## 2021-06-21 PROBLEM — I50.33 ACUTE ON CHRONIC DIASTOLIC (CONGESTIVE) HEART FAILURE: Status: ACTIVE | Noted: 2021-06-21

## 2021-06-21 LAB
BASOPHILS # BLD AUTO: 0.04 K/UL (ref 0–0.2)
BASOPHILS NFR BLD: 0.4 % (ref 0–1.9)
DIFFERENTIAL METHOD: ABNORMAL
EOSINOPHIL # BLD AUTO: 0.2 K/UL (ref 0–0.5)
EOSINOPHIL NFR BLD: 1.5 % (ref 0–8)
ERYTHROCYTE [DISTWIDTH] IN BLOOD BY AUTOMATED COUNT: 18.6 % (ref 11.5–14.5)
HCT VFR BLD AUTO: 28 % (ref 37–48.5)
HGB BLD-MCNC: 8.7 G/DL (ref 12–16)
IMM GRANULOCYTES # BLD AUTO: 0.08 K/UL (ref 0–0.04)
IMM GRANULOCYTES NFR BLD AUTO: 0.7 % (ref 0–0.5)
LYMPHOCYTES # BLD AUTO: 0.8 K/UL (ref 1–4.8)
LYMPHOCYTES NFR BLD: 7.2 % (ref 18–48)
MCH RBC QN AUTO: 29.1 PG (ref 27–31)
MCHC RBC AUTO-ENTMCNC: 31.1 G/DL (ref 32–36)
MCV RBC AUTO: 94 FL (ref 82–98)
MONOCYTES # BLD AUTO: 0.8 K/UL (ref 0.3–1)
MONOCYTES NFR BLD: 7 % (ref 4–15)
NEUTROPHILS # BLD AUTO: 9.5 K/UL (ref 1.8–7.7)
NEUTROPHILS NFR BLD: 83.2 % (ref 38–73)
NRBC BLD-RTO: 0 /100 WBC
PLATELET # BLD AUTO: 289 K/UL (ref 150–450)
PMV BLD AUTO: 9.7 FL (ref 9.2–12.9)
POCT GLUCOSE: 119 MG/DL (ref 70–110)
POCT GLUCOSE: 160 MG/DL (ref 70–110)
POCT GLUCOSE: 161 MG/DL (ref 70–110)
POCT GLUCOSE: 209 MG/DL (ref 70–110)
RBC # BLD AUTO: 2.99 M/UL (ref 4–5.4)
WBC # BLD AUTO: 11.36 K/UL (ref 3.9–12.7)

## 2021-06-21 PROCEDURE — 99024 PR POST-OP FOLLOW-UP VISIT: ICD-10-PCS | Mod: POP,,, | Performed by: SURGERY

## 2021-06-21 PROCEDURE — 25000003 PHARM REV CODE 250: Performed by: PHYSICIAN ASSISTANT

## 2021-06-21 PROCEDURE — 99233 SBSQ HOSP IP/OBS HIGH 50: CPT | Mod: ,,, | Performed by: HOSPITALIST

## 2021-06-21 PROCEDURE — 85025 COMPLETE CBC W/AUTO DIFF WBC: CPT | Performed by: HOSPITALIST

## 2021-06-21 PROCEDURE — 99233 PR SUBSEQUENT HOSPITAL CARE,LEVL III: ICD-10-PCS | Mod: ,,, | Performed by: HOSPITALIST

## 2021-06-21 PROCEDURE — 93010 ELECTROCARDIOGRAM REPORT: CPT | Mod: ,,, | Performed by: INTERNAL MEDICINE

## 2021-06-21 PROCEDURE — 20600001 HC STEP DOWN PRIVATE ROOM

## 2021-06-21 PROCEDURE — 93010 EKG 12-LEAD: ICD-10-PCS | Mod: ,,, | Performed by: INTERNAL MEDICINE

## 2021-06-21 PROCEDURE — 96372 THER/PROPH/DIAG INJ SC/IM: CPT | Mod: 59 | Performed by: EMERGENCY MEDICINE

## 2021-06-21 PROCEDURE — 97530 THERAPEUTIC ACTIVITIES: CPT

## 2021-06-21 PROCEDURE — 97605 NEG PRS WND THER DME<=50SQCM: CPT

## 2021-06-21 PROCEDURE — 97161 PT EVAL LOW COMPLEX 20 MIN: CPT

## 2021-06-21 PROCEDURE — 25000003 PHARM REV CODE 250: Performed by: HOSPITALIST

## 2021-06-21 PROCEDURE — 63600175 PHARM REV CODE 636 W HCPCS: Performed by: PHYSICIAN ASSISTANT

## 2021-06-21 PROCEDURE — 99024 POSTOP FOLLOW-UP VISIT: CPT | Mod: POP,,, | Performed by: SURGERY

## 2021-06-21 PROCEDURE — 96376 TX/PRO/DX INJ SAME DRUG ADON: CPT | Mod: 59 | Performed by: EMERGENCY MEDICINE

## 2021-06-21 PROCEDURE — 93005 ELECTROCARDIOGRAM TRACING: CPT

## 2021-06-21 RX ADMIN — FUROSEMIDE 40 MG: 10 INJECTION, SOLUTION INTRAMUSCULAR; INTRAVENOUS at 09:06

## 2021-06-21 RX ADMIN — INSULIN DETEMIR 8 UNITS: 100 INJECTION, SOLUTION SUBCUTANEOUS at 08:06

## 2021-06-21 RX ADMIN — OXYCODONE HYDROCHLORIDE 10 MG: 10 TABLET ORAL at 02:06

## 2021-06-21 RX ADMIN — SODIUM BICARBONATE 650 MG TABLET 650 MG: at 08:06

## 2021-06-21 RX ADMIN — ATORVASTATIN CALCIUM 80 MG: 40 TABLET, FILM COATED ORAL at 09:06

## 2021-06-21 RX ADMIN — INSULIN ASPART 2 UNITS: 100 INJECTION, SOLUTION INTRAVENOUS; SUBCUTANEOUS at 12:06

## 2021-06-21 RX ADMIN — FUROSEMIDE 40 MG: 10 INJECTION, SOLUTION INTRAMUSCULAR; INTRAVENOUS at 02:06

## 2021-06-21 RX ADMIN — METRONIDAZOLE 500 MG: 500 TABLET ORAL at 09:06

## 2021-06-21 RX ADMIN — INSULIN ASPART 2 UNITS: 100 INJECTION, SOLUTION INTRAVENOUS; SUBCUTANEOUS at 08:06

## 2021-06-21 RX ADMIN — COLLAGENASE SANTYL: 250 OINTMENT TOPICAL at 04:06

## 2021-06-21 RX ADMIN — FUROSEMIDE 40 MG: 10 INJECTION, SOLUTION INTRAMUSCULAR; INTRAVENOUS at 08:06

## 2021-06-21 RX ADMIN — SODIUM BICARBONATE 650 MG TABLET 650 MG: at 09:06

## 2021-06-21 RX ADMIN — ALLOPURINOL 100 MG: 100 TABLET ORAL at 08:06

## 2021-06-21 RX ADMIN — OXYCODONE HYDROCHLORIDE 10 MG: 10 TABLET ORAL at 09:06

## 2021-06-21 RX ADMIN — INSULIN ASPART 2 UNITS: 100 INJECTION, SOLUTION INTRAVENOUS; SUBCUTANEOUS at 05:06

## 2021-06-21 RX ADMIN — LEVOFLOXACIN 750 MG: 750 TABLET, FILM COATED ORAL at 08:06

## 2021-06-21 RX ADMIN — ESCITALOPRAM OXALATE 10 MG: 10 TABLET ORAL at 08:06

## 2021-06-21 RX ADMIN — METRONIDAZOLE 500 MG: 500 TABLET ORAL at 02:06

## 2021-06-21 RX ADMIN — MIRTAZAPINE 7.5 MG: 7.5 TABLET ORAL at 09:06

## 2021-06-21 RX ADMIN — METRONIDAZOLE 500 MG: 500 TABLET ORAL at 05:06

## 2021-06-22 LAB
ALBUMIN SERPL BCP-MCNC: 1.2 G/DL (ref 3.5–5.2)
ALP SERPL-CCNC: 215 U/L (ref 55–135)
ALT SERPL W/O P-5'-P-CCNC: 9 U/L (ref 10–44)
ANION GAP SERPL CALC-SCNC: 7 MMOL/L (ref 8–16)
ASCENDING AORTA: 3.13 CM
AST SERPL-CCNC: 32 U/L (ref 10–40)
AV INDEX (PROSTH): 0.72
AV MEAN GRADIENT: 5 MMHG
AV PEAK GRADIENT: 9 MMHG
AV VALVE AREA: 2.05 CM2
AV VELOCITY RATIO: 0.57
BASOPHILS # BLD AUTO: 0.05 K/UL (ref 0–0.2)
BASOPHILS NFR BLD: 0.5 % (ref 0–1.9)
BILIRUB SERPL-MCNC: 0.3 MG/DL (ref 0.1–1)
BSA FOR ECHO PROCEDURE: 2.07 M2
BUN SERPL-MCNC: 20 MG/DL (ref 6–20)
CALCIUM SERPL-MCNC: 7.7 MG/DL (ref 8.7–10.5)
CHLORIDE SERPL-SCNC: 110 MMOL/L (ref 95–110)
CO2 SERPL-SCNC: 20 MMOL/L (ref 23–29)
CREAT SERPL-MCNC: 1.3 MG/DL (ref 0.5–1.4)
CV ECHO LV RWT: 0.39 CM
DIFFERENTIAL METHOD: ABNORMAL
DOP CALC AO PEAK VEL: 1.53 M/S
DOP CALC AO VTI: 27.88 CM
DOP CALC LVOT AREA: 2.9 CM2
DOP CALC LVOT DIAMETER: 1.91 CM
DOP CALC LVOT PEAK VEL: 0.87 M/S
DOP CALC LVOT STROKE VOLUME: 57.22 CM3
DOP CALCLVOT PEAK VEL VTI: 19.98 CM
E WAVE DECELERATION TIME: 90.93 MSEC
E/A RATIO: 1.76
E/E' RATIO: 8.26 M/S
ECHO LV POSTERIOR WALL: 0.91 CM (ref 0.6–1.1)
EJECTION FRACTION: 55 %
EOSINOPHIL # BLD AUTO: 0.3 K/UL (ref 0–0.5)
EOSINOPHIL NFR BLD: 2.4 % (ref 0–8)
ERYTHROCYTE [DISTWIDTH] IN BLOOD BY AUTOMATED COUNT: 22.3 % (ref 11.5–14.5)
EST. GFR  (AFRICAN AMERICAN): 53.7 ML/MIN/1.73 M^2
EST. GFR  (NON AFRICAN AMERICAN): 46.6 ML/MIN/1.73 M^2
FRACTIONAL SHORTENING: 25 % (ref 28–44)
GLUCOSE SERPL-MCNC: 236 MG/DL (ref 70–110)
HCT VFR BLD AUTO: 28.3 % (ref 37–48.5)
HGB BLD-MCNC: 8.8 G/DL (ref 12–16)
IMM GRANULOCYTES # BLD AUTO: 0.07 K/UL (ref 0–0.04)
IMM GRANULOCYTES NFR BLD AUTO: 0.7 % (ref 0–0.5)
INTERVENTRICULAR SEPTUM: 0.92 CM (ref 0.6–1.1)
LA MAJOR: 4.04 CM
LA MINOR: 3.85 CM
LA WIDTH: 3.4 CM
LEFT ATRIUM SIZE: 3.42 CM
LEFT ATRIUM VOLUME INDEX MOD: 13.4 ML/M2
LEFT ATRIUM VOLUME INDEX: 19.5 ML/M2
LEFT ATRIUM VOLUME MOD: 26.75 CM3
LEFT ATRIUM VOLUME: 38.97 CM3
LEFT INTERNAL DIMENSION IN SYSTOLE: 3.47 CM (ref 2.1–4)
LEFT VENTRICLE DIASTOLIC VOLUME INDEX: 49.53 ML/M2
LEFT VENTRICLE DIASTOLIC VOLUME: 99.06 ML
LEFT VENTRICLE MASS INDEX: 71 G/M2
LEFT VENTRICLE SYSTOLIC VOLUME INDEX: 24.9 ML/M2
LEFT VENTRICLE SYSTOLIC VOLUME: 49.74 ML
LEFT VENTRICULAR INTERNAL DIMENSION IN DIASTOLE: 4.63 CM (ref 3.5–6)
LEFT VENTRICULAR MASS: 142.32 G
LV LATERAL E/E' RATIO: 7.31 M/S
LV SEPTAL E/E' RATIO: 9.5 M/S
LYMPHOCYTES # BLD AUTO: 1.5 K/UL (ref 1–4.8)
LYMPHOCYTES NFR BLD: 14.2 % (ref 18–48)
MCH RBC QN AUTO: 30.3 PG (ref 27–31)
MCHC RBC AUTO-ENTMCNC: 31.1 G/DL (ref 32–36)
MCV RBC AUTO: 98 FL (ref 82–98)
MONOCYTES # BLD AUTO: 0.8 K/UL (ref 0.3–1)
MONOCYTES NFR BLD: 7.4 % (ref 4–15)
MV A" WAVE DURATION": 6.57 MSEC
MV PEAK A VEL: 0.54 M/S
MV PEAK E VEL: 0.95 M/S
MV STENOSIS PRESSURE HALF TIME: 26.37 MS
MV VALVE AREA P 1/2 METHOD: 8.34 CM2
NEUTROPHILS # BLD AUTO: 7.7 K/UL (ref 1.8–7.7)
NEUTROPHILS NFR BLD: 74.8 % (ref 38–73)
NRBC BLD-RTO: 0 /100 WBC
PISA TR MAX VEL: 2.63 M/S
PLATELET # BLD AUTO: 391 K/UL (ref 150–450)
PMV BLD AUTO: 12.2 FL (ref 9.2–12.9)
POCT GLUCOSE: 142 MG/DL (ref 70–110)
POCT GLUCOSE: 198 MG/DL (ref 70–110)
POCT GLUCOSE: 279 MG/DL (ref 70–110)
POCT GLUCOSE: 296 MG/DL (ref 70–110)
POTASSIUM SERPL-SCNC: 4.1 MMOL/L (ref 3.5–5.1)
PROT SERPL-MCNC: 5.7 G/DL (ref 6–8.4)
PULM VEIN S/D RATIO: 1.12
PV PEAK D VEL: 0.43 M/S
PV PEAK S VEL: 0.48 M/S
RA MAJOR: 4.25 CM
RA PRESSURE: 3 MMHG
RA WIDTH: 3.44 CM
RBC # BLD AUTO: 2.9 M/UL (ref 4–5.4)
RIGHT VENTRICULAR END-DIASTOLIC DIMENSION: 3.34 CM
RV TISSUE DOPPLER FREE WALL SYSTOLIC VELOCITY 1 (APICAL 4 CHAMBER VIEW): 10.81 CM/S
SINUS: 2.64 CM
SODIUM SERPL-SCNC: 137 MMOL/L (ref 136–145)
STJ: 2.45 CM
T4 FREE SERPL-MCNC: 0.95 NG/DL (ref 0.71–1.51)
TDI LATERAL: 0.13 M/S
TDI SEPTAL: 0.1 M/S
TDI: 0.12 M/S
TR MAX PG: 28 MMHG
TRICUSPID ANNULAR PLANE SYSTOLIC EXCURSION: 1.04 CM
TSH SERPL DL<=0.005 MIU/L-ACNC: 6.13 UIU/ML (ref 0.4–4)
TV REST PULMONARY ARTERY PRESSURE: 31 MMHG
WBC # BLD AUTO: 10.35 K/UL (ref 3.9–12.7)

## 2021-06-22 PROCEDURE — 25000003 PHARM REV CODE 250: Performed by: PHYSICIAN ASSISTANT

## 2021-06-22 PROCEDURE — 84443 ASSAY THYROID STIM HORMONE: CPT | Performed by: PHYSICIAN ASSISTANT

## 2021-06-22 PROCEDURE — 80053 COMPREHEN METABOLIC PANEL: CPT | Mod: 91 | Performed by: HOSPITALIST

## 2021-06-22 PROCEDURE — 84439 ASSAY OF FREE THYROXINE: CPT | Performed by: PHYSICIAN ASSISTANT

## 2021-06-22 PROCEDURE — 99233 PR SUBSEQUENT HOSPITAL CARE,LEVL III: ICD-10-PCS | Mod: ,,, | Performed by: HOSPITALIST

## 2021-06-22 PROCEDURE — 83735 ASSAY OF MAGNESIUM: CPT | Performed by: PHYSICIAN ASSISTANT

## 2021-06-22 PROCEDURE — 80053 COMPREHEN METABOLIC PANEL: CPT | Performed by: PHYSICIAN ASSISTANT

## 2021-06-22 PROCEDURE — 20600001 HC STEP DOWN PRIVATE ROOM

## 2021-06-22 PROCEDURE — 85025 COMPLETE CBC W/AUTO DIFF WBC: CPT | Performed by: PHYSICIAN ASSISTANT

## 2021-06-22 PROCEDURE — 63600175 PHARM REV CODE 636 W HCPCS: Performed by: PHYSICIAN ASSISTANT

## 2021-06-22 PROCEDURE — 97530 THERAPEUTIC ACTIVITIES: CPT

## 2021-06-22 PROCEDURE — 99233 SBSQ HOSP IP/OBS HIGH 50: CPT | Mod: ,,, | Performed by: HOSPITALIST

## 2021-06-22 RX ADMIN — METRONIDAZOLE 500 MG: 500 TABLET ORAL at 05:06

## 2021-06-22 RX ADMIN — INSULIN ASPART 2 UNITS: 100 INJECTION, SOLUTION INTRAVENOUS; SUBCUTANEOUS at 12:06

## 2021-06-22 RX ADMIN — METRONIDAZOLE 500 MG: 500 TABLET ORAL at 04:06

## 2021-06-22 RX ADMIN — SODIUM BICARBONATE 650 MG TABLET 650 MG: at 09:06

## 2021-06-22 RX ADMIN — METRONIDAZOLE 500 MG: 500 TABLET ORAL at 03:06

## 2021-06-22 RX ADMIN — INSULIN ASPART 2 UNITS: 100 INJECTION, SOLUTION INTRAVENOUS; SUBCUTANEOUS at 04:06

## 2021-06-22 RX ADMIN — INSULIN ASPART 2 UNITS: 100 INJECTION, SOLUTION INTRAVENOUS; SUBCUTANEOUS at 07:06

## 2021-06-22 RX ADMIN — FUROSEMIDE 40 MG: 10 INJECTION, SOLUTION INTRAMUSCULAR; INTRAVENOUS at 09:06

## 2021-06-22 RX ADMIN — METRONIDAZOLE 500 MG: 500 TABLET ORAL at 09:06

## 2021-06-22 RX ADMIN — MIRTAZAPINE 7.5 MG: 7.5 TABLET ORAL at 09:06

## 2021-06-22 RX ADMIN — COLLAGENASE SANTYL: 250 OINTMENT TOPICAL at 09:06

## 2021-06-22 RX ADMIN — INSULIN DETEMIR 8 UNITS: 100 INJECTION, SOLUTION SUBCUTANEOUS at 09:06

## 2021-06-22 RX ADMIN — ESCITALOPRAM OXALATE 10 MG: 10 TABLET ORAL at 09:06

## 2021-06-22 RX ADMIN — INSULIN ASPART 3 UNITS: 100 INJECTION, SOLUTION INTRAVENOUS; SUBCUTANEOUS at 04:06

## 2021-06-22 RX ADMIN — INSULIN ASPART 1 UNITS: 100 INJECTION, SOLUTION INTRAVENOUS; SUBCUTANEOUS at 09:06

## 2021-06-22 RX ADMIN — FUROSEMIDE 40 MG: 10 INJECTION, SOLUTION INTRAMUSCULAR; INTRAVENOUS at 04:06

## 2021-06-22 RX ADMIN — ATORVASTATIN CALCIUM 80 MG: 40 TABLET, FILM COATED ORAL at 09:06

## 2021-06-22 RX ADMIN — ALLOPURINOL 100 MG: 100 TABLET ORAL at 09:06

## 2021-06-22 RX ADMIN — OXYCODONE HYDROCHLORIDE 10 MG: 10 TABLET ORAL at 04:06

## 2021-06-22 RX ADMIN — LEVOFLOXACIN 750 MG: 750 TABLET, FILM COATED ORAL at 09:06

## 2021-06-22 RX ADMIN — OXYCODONE 5 MG: 5 TABLET ORAL at 09:06

## 2021-06-22 RX ADMIN — OXYCODONE HYDROCHLORIDE 10 MG: 10 TABLET ORAL at 10:06

## 2021-06-23 ENCOUNTER — PATIENT MESSAGE (OUTPATIENT)
Dept: PSYCHOLOGY | Facility: HOSPITAL | Age: 56
End: 2021-06-23

## 2021-06-23 LAB
ALBUMIN SERPL BCP-MCNC: 1.3 G/DL (ref 3.5–5.2)
ALP SERPL-CCNC: 212 U/L (ref 55–135)
ALT SERPL W/O P-5'-P-CCNC: 8 U/L (ref 10–44)
ANION GAP SERPL CALC-SCNC: 9 MMOL/L (ref 8–16)
AST SERPL-CCNC: 24 U/L (ref 10–40)
BACTERIA BLD CULT: NORMAL
BASOPHILS # BLD AUTO: 0.04 K/UL (ref 0–0.2)
BASOPHILS NFR BLD: 0.3 % (ref 0–1.9)
BILIRUB SERPL-MCNC: 0.4 MG/DL (ref 0.1–1)
BUN SERPL-MCNC: 20 MG/DL (ref 6–20)
CALCIUM SERPL-MCNC: 7.6 MG/DL (ref 8.7–10.5)
CHLORIDE SERPL-SCNC: 110 MMOL/L (ref 95–110)
CO2 SERPL-SCNC: 18 MMOL/L (ref 23–29)
CREAT SERPL-MCNC: 1.3 MG/DL (ref 0.5–1.4)
DIFFERENTIAL METHOD: ABNORMAL
EOSINOPHIL # BLD AUTO: 0.3 K/UL (ref 0–0.5)
EOSINOPHIL NFR BLD: 2.9 % (ref 0–8)
ERYTHROCYTE [DISTWIDTH] IN BLOOD BY AUTOMATED COUNT: 18.6 % (ref 11.5–14.5)
EST. GFR  (AFRICAN AMERICAN): 53.7 ML/MIN/1.73 M^2
EST. GFR  (NON AFRICAN AMERICAN): 46.6 ML/MIN/1.73 M^2
GLUCOSE SERPL-MCNC: 244 MG/DL (ref 70–110)
HCT VFR BLD AUTO: 28.8 % (ref 37–48.5)
HGB BLD-MCNC: 8.8 G/DL (ref 12–16)
IMM GRANULOCYTES # BLD AUTO: 0.06 K/UL (ref 0–0.04)
IMM GRANULOCYTES NFR BLD AUTO: 0.5 % (ref 0–0.5)
LYMPHOCYTES # BLD AUTO: 1.4 K/UL (ref 1–4.8)
LYMPHOCYTES NFR BLD: 11.7 % (ref 18–48)
MAGNESIUM SERPL-MCNC: 1.5 MG/DL (ref 1.6–2.6)
MCH RBC QN AUTO: 28.6 PG (ref 27–31)
MCHC RBC AUTO-ENTMCNC: 30.6 G/DL (ref 32–36)
MCV RBC AUTO: 94 FL (ref 82–98)
MONOCYTES # BLD AUTO: 0.9 K/UL (ref 0.3–1)
MONOCYTES NFR BLD: 7.4 % (ref 4–15)
NEUTROPHILS # BLD AUTO: 8.9 K/UL (ref 1.8–7.7)
NEUTROPHILS NFR BLD: 77.2 % (ref 38–73)
NRBC BLD-RTO: 0 /100 WBC
PLATELET # BLD AUTO: 323 K/UL (ref 150–450)
PMV BLD AUTO: 9.6 FL (ref 9.2–12.9)
POCT GLUCOSE: 251 MG/DL (ref 70–110)
POCT GLUCOSE: 253 MG/DL (ref 70–110)
POCT GLUCOSE: 320 MG/DL (ref 70–110)
POCT GLUCOSE: 339 MG/DL (ref 70–110)
POTASSIUM SERPL-SCNC: 4 MMOL/L (ref 3.5–5.1)
PROT SERPL-MCNC: 5.9 G/DL (ref 6–8.4)
RBC # BLD AUTO: 3.08 M/UL (ref 4–5.4)
SODIUM SERPL-SCNC: 137 MMOL/L (ref 136–145)
WBC # BLD AUTO: 11.53 K/UL (ref 3.9–12.7)

## 2021-06-23 PROCEDURE — 90834 PR PSYCHOTHERAPY W/PATIENT, 45 MIN: ICD-10-PCS | Mod: ,,, | Performed by: STUDENT IN AN ORGANIZED HEALTH CARE EDUCATION/TRAINING PROGRAM

## 2021-06-23 PROCEDURE — 63600175 PHARM REV CODE 636 W HCPCS: Performed by: PHYSICIAN ASSISTANT

## 2021-06-23 PROCEDURE — 80053 COMPREHEN METABOLIC PANEL: CPT | Performed by: PHYSICIAN ASSISTANT

## 2021-06-23 PROCEDURE — 99024 POSTOP FOLLOW-UP VISIT: CPT | Mod: POP,,, | Performed by: SURGERY

## 2021-06-23 PROCEDURE — 99233 SBSQ HOSP IP/OBS HIGH 50: CPT | Mod: ,,, | Performed by: HOSPITALIST

## 2021-06-23 PROCEDURE — 99024 PR POST-OP FOLLOW-UP VISIT: ICD-10-PCS | Mod: POP,,, | Performed by: SURGERY

## 2021-06-23 PROCEDURE — 63600175 PHARM REV CODE 636 W HCPCS: Performed by: HOSPITALIST

## 2021-06-23 PROCEDURE — 90834 PSYTX W PT 45 MINUTES: CPT | Mod: ,,, | Performed by: STUDENT IN AN ORGANIZED HEALTH CARE EDUCATION/TRAINING PROGRAM

## 2021-06-23 PROCEDURE — 85025 COMPLETE CBC W/AUTO DIFF WBC: CPT | Performed by: PHYSICIAN ASSISTANT

## 2021-06-23 PROCEDURE — 83735 ASSAY OF MAGNESIUM: CPT | Performed by: PHYSICIAN ASSISTANT

## 2021-06-23 PROCEDURE — 25000003 PHARM REV CODE 250: Performed by: PHYSICIAN ASSISTANT

## 2021-06-23 PROCEDURE — 20600001 HC STEP DOWN PRIVATE ROOM

## 2021-06-23 PROCEDURE — 99233 PR SUBSEQUENT HOSPITAL CARE,LEVL III: ICD-10-PCS | Mod: ,,, | Performed by: HOSPITALIST

## 2021-06-23 RX ORDER — HEPARIN SODIUM 5000 [USP'U]/ML
5000 INJECTION, SOLUTION INTRAVENOUS; SUBCUTANEOUS EVERY 8 HOURS
Status: DISCONTINUED | OUTPATIENT
Start: 2021-06-23 | End: 2021-06-30

## 2021-06-23 RX ORDER — MAGNESIUM SULFATE HEPTAHYDRATE 40 MG/ML
2 INJECTION, SOLUTION INTRAVENOUS ONCE
Status: COMPLETED | OUTPATIENT
Start: 2021-06-23 | End: 2021-06-23

## 2021-06-23 RX ORDER — FUROSEMIDE 10 MG/ML
60 INJECTION INTRAMUSCULAR; INTRAVENOUS 3 TIMES DAILY
Status: DISCONTINUED | OUTPATIENT
Start: 2021-06-23 | End: 2021-06-24

## 2021-06-23 RX ADMIN — METRONIDAZOLE 500 MG: 500 TABLET ORAL at 09:06

## 2021-06-23 RX ADMIN — INSULIN ASPART 2 UNITS: 100 INJECTION, SOLUTION INTRAVENOUS; SUBCUTANEOUS at 07:06

## 2021-06-23 RX ADMIN — FUROSEMIDE 60 MG: 10 INJECTION, SOLUTION INTRAMUSCULAR; INTRAVENOUS at 02:06

## 2021-06-23 RX ADMIN — INSULIN DETEMIR 8 UNITS: 100 INJECTION, SOLUTION SUBCUTANEOUS at 08:06

## 2021-06-23 RX ADMIN — MAGNESIUM SULFATE HEPTAHYDRATE 2 G: 40 INJECTION, SOLUTION INTRAVENOUS at 10:06

## 2021-06-23 RX ADMIN — HEPARIN SODIUM 5000 UNITS: 5000 INJECTION INTRAVENOUS; SUBCUTANEOUS at 09:06

## 2021-06-23 RX ADMIN — OXYCODONE HYDROCHLORIDE 10 MG: 10 TABLET ORAL at 09:06

## 2021-06-23 RX ADMIN — INSULIN ASPART 2 UNITS: 100 INJECTION, SOLUTION INTRAVENOUS; SUBCUTANEOUS at 09:06

## 2021-06-23 RX ADMIN — HEPARIN SODIUM 5000 UNITS: 5000 INJECTION INTRAVENOUS; SUBCUTANEOUS at 02:06

## 2021-06-23 RX ADMIN — ATORVASTATIN CALCIUM 80 MG: 40 TABLET, FILM COATED ORAL at 09:06

## 2021-06-23 RX ADMIN — INSULIN ASPART 3 UNITS: 100 INJECTION, SOLUTION INTRAVENOUS; SUBCUTANEOUS at 11:06

## 2021-06-23 RX ADMIN — INSULIN ASPART 4 UNITS: 100 INJECTION, SOLUTION INTRAVENOUS; SUBCUTANEOUS at 05:06

## 2021-06-23 RX ADMIN — MIRTAZAPINE 7.5 MG: 7.5 TABLET ORAL at 09:06

## 2021-06-23 RX ADMIN — LEVOFLOXACIN 750 MG: 750 TABLET, FILM COATED ORAL at 08:06

## 2021-06-23 RX ADMIN — SODIUM BICARBONATE 650 MG TABLET 650 MG: at 09:06

## 2021-06-23 RX ADMIN — COLLAGENASE SANTYL: 250 OINTMENT TOPICAL at 08:06

## 2021-06-23 RX ADMIN — OXYCODONE HYDROCHLORIDE 10 MG: 10 TABLET ORAL at 05:06

## 2021-06-23 RX ADMIN — SODIUM BICARBONATE 650 MG TABLET 650 MG: at 08:06

## 2021-06-23 RX ADMIN — INSULIN ASPART 2 UNITS: 100 INJECTION, SOLUTION INTRAVENOUS; SUBCUTANEOUS at 05:06

## 2021-06-23 RX ADMIN — ESCITALOPRAM OXALATE 10 MG: 10 TABLET ORAL at 08:06

## 2021-06-23 RX ADMIN — METRONIDAZOLE 500 MG: 500 TABLET ORAL at 05:06

## 2021-06-23 RX ADMIN — INSULIN ASPART 2 UNITS: 100 INJECTION, SOLUTION INTRAVENOUS; SUBCUTANEOUS at 11:06

## 2021-06-23 RX ADMIN — METRONIDAZOLE 500 MG: 500 TABLET ORAL at 02:06

## 2021-06-23 RX ADMIN — ALLOPURINOL 100 MG: 100 TABLET ORAL at 08:06

## 2021-06-23 RX ADMIN — INSULIN ASPART 2 UNITS: 100 INJECTION, SOLUTION INTRAVENOUS; SUBCUTANEOUS at 08:06

## 2021-06-23 RX ADMIN — FUROSEMIDE 60 MG: 10 INJECTION, SOLUTION INTRAMUSCULAR; INTRAVENOUS at 09:06

## 2021-06-23 RX ADMIN — FUROSEMIDE 40 MG: 10 INJECTION, SOLUTION INTRAMUSCULAR; INTRAVENOUS at 08:06

## 2021-06-24 ENCOUNTER — PATIENT OUTREACH (OUTPATIENT)
Dept: ADMINISTRATIVE | Facility: OTHER | Age: 56
End: 2021-06-24

## 2021-06-24 PROBLEM — F43.0 PANIC ATTACK AS REACTION TO STRESS: Status: RESOLVED | Noted: 2018-05-05 | Resolved: 2021-06-24

## 2021-06-24 PROBLEM — R80.9 PROTEINURIA: Status: RESOLVED | Noted: 2021-06-19 | Resolved: 2021-06-24

## 2021-06-24 PROBLEM — I50.33 ACUTE ON CHRONIC DIASTOLIC (CONGESTIVE) HEART FAILURE: Status: RESOLVED | Noted: 2021-06-21 | Resolved: 2021-06-24

## 2021-06-24 PROBLEM — Z98.890 CRITICAL LIMB ISCHEMIA WITH HISTORY OF REVASCULARIZATION OF SAME EXTREMITY: Status: RESOLVED | Noted: 2021-05-01 | Resolved: 2021-06-24

## 2021-06-24 PROBLEM — M86.172 ACUTE OSTEOMYELITIS OF LEFT CALCANEUS: Status: RESOLVED | Noted: 2020-07-18 | Resolved: 2021-06-24

## 2021-06-24 PROBLEM — T50.8X5A CONTRAST DYE INDUCED NEPHROPATHY: Status: RESOLVED | Noted: 2020-01-04 | Resolved: 2021-06-24

## 2021-06-24 PROBLEM — E11.621 DIABETIC ULCER OF LEFT HEEL ASSOCIATED WITH TYPE 2 DIABETES MELLITUS, WITH BONE INVOLVEMENT WITHOUT EVIDENCE OF NECROSIS: Status: RESOLVED | Noted: 2020-08-26 | Resolved: 2021-06-24

## 2021-06-24 PROBLEM — T81.49XA WOUND INFECTION AFTER SURGERY: Status: RESOLVED | Noted: 2021-06-18 | Resolved: 2021-06-24

## 2021-06-24 PROBLEM — R74.8 ELEVATED SERUM ALKALINE PHOSPHATASE LEVEL: Status: RESOLVED | Noted: 2021-05-24 | Resolved: 2021-06-24

## 2021-06-24 PROBLEM — R60.1 ANASARCA: Status: RESOLVED | Noted: 2021-06-18 | Resolved: 2021-06-24

## 2021-06-24 PROBLEM — M86.9 OSTEOMYELITIS OF LEFT FOOT: Status: RESOLVED | Noted: 2021-04-30 | Resolved: 2021-06-24

## 2021-06-24 PROBLEM — E11.621 DIABETIC ULCER OF LEFT HEEL ASSOCIATED WITH TYPE 2 DIABETES MELLITUS, WITH FAT LAYER EXPOSED: Status: RESOLVED | Noted: 2021-03-05 | Resolved: 2021-06-24

## 2021-06-24 PROBLEM — L97.426 DIABETIC ULCER OF LEFT HEEL ASSOCIATED WITH TYPE 2 DIABETES MELLITUS, WITH BONE INVOLVEMENT WITHOUT EVIDENCE OF NECROSIS: Status: RESOLVED | Noted: 2020-08-26 | Resolved: 2021-06-24

## 2021-06-24 PROBLEM — Z79.899 ENCOUNTER FOR LONG-TERM (CURRENT) USE OF OTHER MEDICATIONS: Status: RESOLVED | Noted: 2021-02-24 | Resolved: 2021-06-24

## 2021-06-24 PROBLEM — M86.179: Status: RESOLVED | Noted: 2021-03-09 | Resolved: 2021-06-24

## 2021-06-24 PROBLEM — Z63.4 BEREAVEMENT: Status: RESOLVED | Noted: 2018-05-07 | Resolved: 2021-06-24

## 2021-06-24 PROBLEM — I70.229 CRITICAL LIMB ISCHEMIA WITH HISTORY OF REVASCULARIZATION OF SAME EXTREMITY: Status: RESOLVED | Noted: 2021-05-01 | Resolved: 2021-06-24

## 2021-06-24 PROBLEM — N14.11 CONTRAST DYE INDUCED NEPHROPATHY: Status: RESOLVED | Noted: 2020-01-04 | Resolved: 2021-06-24

## 2021-06-24 PROBLEM — I99.8 ISCHEMIA OF LOWER EXTREMITY: Status: RESOLVED | Noted: 2020-09-02 | Resolved: 2021-06-24

## 2021-06-24 PROBLEM — I25.5 ISCHEMIC CARDIOMYOPATHY: Status: RESOLVED | Noted: 2020-03-16 | Resolved: 2021-06-24

## 2021-06-24 PROBLEM — I70.229 CRITICAL LOWER LIMB ISCHEMIA: Status: RESOLVED | Noted: 2020-08-18 | Resolved: 2021-06-24

## 2021-06-24 PROBLEM — Z95.1 S/P CABG X 1: Status: RESOLVED | Noted: 2020-01-27 | Resolved: 2021-06-24

## 2021-06-24 PROBLEM — I42.0 DILATED CARDIOMYOPATHY: Status: RESOLVED | Noted: 2019-12-13 | Resolved: 2021-06-24

## 2021-06-24 PROBLEM — F41.0 PANIC ATTACK AS REACTION TO STRESS: Status: RESOLVED | Noted: 2018-05-05 | Resolved: 2021-06-24

## 2021-06-24 PROBLEM — R23.9 ALTERATION IN SKIN INTEGRITY IN ADULT: Status: RESOLVED | Noted: 2020-01-31 | Resolved: 2021-06-24

## 2021-06-24 PROBLEM — Z78.9 ON ENTERAL NUTRITION: Status: RESOLVED | Noted: 2021-05-24 | Resolved: 2021-06-24

## 2021-06-24 PROBLEM — L97.422 DIABETIC ULCER OF LEFT HEEL ASSOCIATED WITH TYPE 2 DIABETES MELLITUS, WITH FAT LAYER EXPOSED: Status: RESOLVED | Noted: 2021-03-05 | Resolved: 2021-06-24

## 2021-06-24 PROBLEM — I70.229 CRITICAL ISCHEMIA OF LOWER EXTREMITY: Status: RESOLVED | Noted: 2021-05-01 | Resolved: 2021-06-24

## 2021-06-24 PROBLEM — E83.42 HYPOMAGNESEMIA: Status: ACTIVE | Noted: 2021-06-24

## 2021-06-24 LAB
ALBUMIN SERPL BCP-MCNC: 1.3 G/DL (ref 3.5–5.2)
ALP SERPL-CCNC: 193 U/L (ref 55–135)
ALT SERPL W/O P-5'-P-CCNC: 8 U/L (ref 10–44)
ANION GAP SERPL CALC-SCNC: 8 MMOL/L (ref 8–16)
AST SERPL-CCNC: 24 U/L (ref 10–40)
BASOPHILS # BLD AUTO: 0.05 K/UL (ref 0–0.2)
BASOPHILS NFR BLD: 0.5 % (ref 0–1.9)
BILIRUB SERPL-MCNC: 0.5 MG/DL (ref 0.1–1)
BUN SERPL-MCNC: 19 MG/DL (ref 6–20)
CALCIUM SERPL-MCNC: 7.9 MG/DL (ref 8.7–10.5)
CHLORIDE SERPL-SCNC: 110 MMOL/L (ref 95–110)
CO2 SERPL-SCNC: 19 MMOL/L (ref 23–29)
CREAT SERPL-MCNC: 1.1 MG/DL (ref 0.5–1.4)
DIFFERENTIAL METHOD: ABNORMAL
EOSINOPHIL # BLD AUTO: 0.4 K/UL (ref 0–0.5)
EOSINOPHIL NFR BLD: 3.5 % (ref 0–8)
ERYTHROCYTE [DISTWIDTH] IN BLOOD BY AUTOMATED COUNT: 18.5 % (ref 11.5–14.5)
EST. GFR  (AFRICAN AMERICAN): >60 ML/MIN/1.73 M^2
EST. GFR  (NON AFRICAN AMERICAN): 57.1 ML/MIN/1.73 M^2
GLUCOSE SERPL-MCNC: 249 MG/DL (ref 70–110)
HCT VFR BLD AUTO: 26.9 % (ref 37–48.5)
HGB BLD-MCNC: 8.6 G/DL (ref 12–16)
IMM GRANULOCYTES # BLD AUTO: 0.07 K/UL (ref 0–0.04)
IMM GRANULOCYTES NFR BLD AUTO: 0.7 % (ref 0–0.5)
LYMPHOCYTES # BLD AUTO: 1.5 K/UL (ref 1–4.8)
LYMPHOCYTES NFR BLD: 13.8 % (ref 18–48)
MAGNESIUM SERPL-MCNC: 1.6 MG/DL (ref 1.6–2.6)
MCH RBC QN AUTO: 29.7 PG (ref 27–31)
MCHC RBC AUTO-ENTMCNC: 32 G/DL (ref 32–36)
MCV RBC AUTO: 93 FL (ref 82–98)
MONOCYTES # BLD AUTO: 0.8 K/UL (ref 0.3–1)
MONOCYTES NFR BLD: 7.5 % (ref 4–15)
NEUTROPHILS # BLD AUTO: 7.8 K/UL (ref 1.8–7.7)
NEUTROPHILS NFR BLD: 74 % (ref 38–73)
NRBC BLD-RTO: 0 /100 WBC
PLATELET # BLD AUTO: 301 K/UL (ref 150–450)
PMV BLD AUTO: 9.8 FL (ref 9.2–12.9)
POCT GLUCOSE: 118 MG/DL (ref 70–110)
POCT GLUCOSE: 196 MG/DL (ref 70–110)
POCT GLUCOSE: 234 MG/DL (ref 70–110)
POCT GLUCOSE: 238 MG/DL (ref 70–110)
POTASSIUM SERPL-SCNC: 3.9 MMOL/L (ref 3.5–5.1)
PROT SERPL-MCNC: 5.9 G/DL (ref 6–8.4)
RBC # BLD AUTO: 2.9 M/UL (ref 4–5.4)
SODIUM SERPL-SCNC: 137 MMOL/L (ref 136–145)
WBC # BLD AUTO: 10.5 K/UL (ref 3.9–12.7)

## 2021-06-24 PROCEDURE — 97530 THERAPEUTIC ACTIVITIES: CPT | Mod: CO

## 2021-06-24 PROCEDURE — 97535 SELF CARE MNGMENT TRAINING: CPT | Mod: CO

## 2021-06-24 PROCEDURE — 99233 PR SUBSEQUENT HOSPITAL CARE,LEVL III: ICD-10-PCS | Mod: ,,, | Performed by: INTERNAL MEDICINE

## 2021-06-24 PROCEDURE — 63600175 PHARM REV CODE 636 W HCPCS: Performed by: INTERNAL MEDICINE

## 2021-06-24 PROCEDURE — 25000003 PHARM REV CODE 250: Performed by: PHYSICIAN ASSISTANT

## 2021-06-24 PROCEDURE — C9399 UNCLASSIFIED DRUGS OR BIOLOG: HCPCS | Performed by: INTERNAL MEDICINE

## 2021-06-24 PROCEDURE — 20600001 HC STEP DOWN PRIVATE ROOM

## 2021-06-24 PROCEDURE — 99233 SBSQ HOSP IP/OBS HIGH 50: CPT | Mod: ,,, | Performed by: INTERNAL MEDICINE

## 2021-06-24 PROCEDURE — 83735 ASSAY OF MAGNESIUM: CPT | Performed by: PHYSICIAN ASSISTANT

## 2021-06-24 PROCEDURE — 97605 NEG PRS WND THER DME<=50SQCM: CPT

## 2021-06-24 PROCEDURE — 97110 THERAPEUTIC EXERCISES: CPT | Mod: CO

## 2021-06-24 PROCEDURE — 97530 THERAPEUTIC ACTIVITIES: CPT

## 2021-06-24 PROCEDURE — 80053 COMPREHEN METABOLIC PANEL: CPT | Performed by: PHYSICIAN ASSISTANT

## 2021-06-24 PROCEDURE — 85025 COMPLETE CBC W/AUTO DIFF WBC: CPT | Performed by: PHYSICIAN ASSISTANT

## 2021-06-24 PROCEDURE — 63600175 PHARM REV CODE 636 W HCPCS: Performed by: HOSPITALIST

## 2021-06-24 PROCEDURE — 25000003 PHARM REV CODE 250: Performed by: INTERNAL MEDICINE

## 2021-06-24 RX ORDER — MAGNESIUM SULFATE HEPTAHYDRATE 40 MG/ML
2 INJECTION, SOLUTION INTRAVENOUS ONCE
Status: COMPLETED | OUTPATIENT
Start: 2021-06-24 | End: 2021-06-24

## 2021-06-24 RX ORDER — FUROSEMIDE 80 MG/1
80 TABLET ORAL 2 TIMES DAILY
Status: DISCONTINUED | OUTPATIENT
Start: 2021-06-24 | End: 2021-06-25

## 2021-06-24 RX ORDER — LANOLIN ALCOHOL/MO/W.PET/CERES
400 CREAM (GRAM) TOPICAL 2 TIMES DAILY
Status: DISCONTINUED | OUTPATIENT
Start: 2021-06-24 | End: 2021-07-01 | Stop reason: HOSPADM

## 2021-06-24 RX ORDER — INSULIN ASPART 100 [IU]/ML
5 INJECTION, SOLUTION INTRAVENOUS; SUBCUTANEOUS
Status: DISCONTINUED | OUTPATIENT
Start: 2021-06-24 | End: 2021-07-01 | Stop reason: HOSPADM

## 2021-06-24 RX ADMIN — OXYCODONE HYDROCHLORIDE 10 MG: 10 TABLET ORAL at 10:06

## 2021-06-24 RX ADMIN — METRONIDAZOLE 500 MG: 500 TABLET ORAL at 05:06

## 2021-06-24 RX ADMIN — INSULIN ASPART 5 UNITS: 100 INJECTION, SOLUTION INTRAVENOUS; SUBCUTANEOUS at 01:06

## 2021-06-24 RX ADMIN — OXYCODONE HYDROCHLORIDE 10 MG: 10 TABLET ORAL at 03:06

## 2021-06-24 RX ADMIN — FUROSEMIDE 60 MG: 10 INJECTION, SOLUTION INTRAMUSCULAR; INTRAVENOUS at 08:06

## 2021-06-24 RX ADMIN — FUROSEMIDE 80 MG: 80 TABLET ORAL at 05:06

## 2021-06-24 RX ADMIN — LEVOFLOXACIN 750 MG: 750 TABLET, FILM COATED ORAL at 08:06

## 2021-06-24 RX ADMIN — COLLAGENASE SANTYL: 250 OINTMENT TOPICAL at 08:06

## 2021-06-24 RX ADMIN — HEPARIN SODIUM 5000 UNITS: 5000 INJECTION INTRAVENOUS; SUBCUTANEOUS at 05:06

## 2021-06-24 RX ADMIN — METRONIDAZOLE 500 MG: 500 TABLET ORAL at 08:06

## 2021-06-24 RX ADMIN — MAGNESIUM OXIDE TAB 400 MG (241.3 MG ELEMENTAL MG) 400 MG: 400 (241.3 MG) TAB at 08:06

## 2021-06-24 RX ADMIN — INSULIN ASPART 2 UNITS: 100 INJECTION, SOLUTION INTRAVENOUS; SUBCUTANEOUS at 07:06

## 2021-06-24 RX ADMIN — ESCITALOPRAM OXALATE 10 MG: 10 TABLET ORAL at 08:06

## 2021-06-24 RX ADMIN — HEPARIN SODIUM 5000 UNITS: 5000 INJECTION INTRAVENOUS; SUBCUTANEOUS at 08:06

## 2021-06-24 RX ADMIN — INSULIN ASPART 5 UNITS: 100 INJECTION, SOLUTION INTRAVENOUS; SUBCUTANEOUS at 05:06

## 2021-06-24 RX ADMIN — INSULIN ASPART 2 UNITS: 100 INJECTION, SOLUTION INTRAVENOUS; SUBCUTANEOUS at 01:06

## 2021-06-24 RX ADMIN — OXYCODONE HYDROCHLORIDE 10 MG: 10 TABLET ORAL at 08:06

## 2021-06-24 RX ADMIN — MIRTAZAPINE 7.5 MG: 7.5 TABLET ORAL at 08:06

## 2021-06-24 RX ADMIN — MAGNESIUM SULFATE HEPTAHYDRATE 2 G: 40 INJECTION, SOLUTION INTRAVENOUS at 08:06

## 2021-06-24 RX ADMIN — ALLOPURINOL 100 MG: 100 TABLET ORAL at 08:06

## 2021-06-24 RX ADMIN — SODIUM BICARBONATE 650 MG TABLET 650 MG: at 08:06

## 2021-06-24 RX ADMIN — ATORVASTATIN CALCIUM 80 MG: 40 TABLET, FILM COATED ORAL at 08:06

## 2021-06-24 RX ADMIN — METRONIDAZOLE 500 MG: 500 TABLET ORAL at 01:06

## 2021-06-24 RX ADMIN — INSULIN DETEMIR 10 UNITS: 100 INJECTION, SOLUTION SUBCUTANEOUS at 08:06

## 2021-06-24 RX ADMIN — HEPARIN SODIUM 5000 UNITS: 5000 INJECTION INTRAVENOUS; SUBCUTANEOUS at 01:06

## 2021-06-25 LAB
ALBUMIN SERPL BCP-MCNC: 1.3 G/DL (ref 3.5–5.2)
ALP SERPL-CCNC: 170 U/L (ref 55–135)
ALT SERPL W/O P-5'-P-CCNC: 7 U/L (ref 10–44)
ANION GAP SERPL CALC-SCNC: 7 MMOL/L (ref 8–16)
AST SERPL-CCNC: 29 U/L (ref 10–40)
BASOPHILS # BLD AUTO: 0.06 K/UL (ref 0–0.2)
BASOPHILS NFR BLD: 0.6 % (ref 0–1.9)
BILIRUB SERPL-MCNC: 0.3 MG/DL (ref 0.1–1)
BUN SERPL-MCNC: 17 MG/DL (ref 6–20)
CALCIUM SERPL-MCNC: 7.9 MG/DL (ref 8.7–10.5)
CHLORIDE SERPL-SCNC: 111 MMOL/L (ref 95–110)
CO2 SERPL-SCNC: 20 MMOL/L (ref 23–29)
CREAT SERPL-MCNC: 1 MG/DL (ref 0.5–1.4)
DIFFERENTIAL METHOD: ABNORMAL
EOSINOPHIL # BLD AUTO: 0.4 K/UL (ref 0–0.5)
EOSINOPHIL NFR BLD: 4.2 % (ref 0–8)
ERYTHROCYTE [DISTWIDTH] IN BLOOD BY AUTOMATED COUNT: 18.5 % (ref 11.5–14.5)
EST. GFR  (AFRICAN AMERICAN): >60 ML/MIN/1.73 M^2
EST. GFR  (NON AFRICAN AMERICAN): >60 ML/MIN/1.73 M^2
GLUCOSE SERPL-MCNC: 113 MG/DL (ref 70–110)
HCT VFR BLD AUTO: 26.4 % (ref 37–48.5)
HGB BLD-MCNC: 8.4 G/DL (ref 12–16)
IMM GRANULOCYTES # BLD AUTO: 0.04 K/UL (ref 0–0.04)
IMM GRANULOCYTES NFR BLD AUTO: 0.4 % (ref 0–0.5)
LYMPHOCYTES # BLD AUTO: 1.6 K/UL (ref 1–4.8)
LYMPHOCYTES NFR BLD: 15.9 % (ref 18–48)
MAGNESIUM SERPL-MCNC: 1.8 MG/DL (ref 1.6–2.6)
MCH RBC QN AUTO: 29.8 PG (ref 27–31)
MCHC RBC AUTO-ENTMCNC: 31.8 G/DL (ref 32–36)
MCV RBC AUTO: 94 FL (ref 82–98)
MONOCYTES # BLD AUTO: 0.9 K/UL (ref 0.3–1)
MONOCYTES NFR BLD: 8.4 % (ref 4–15)
NEUTROPHILS # BLD AUTO: 7.1 K/UL (ref 1.8–7.7)
NEUTROPHILS NFR BLD: 70.5 % (ref 38–73)
NRBC BLD-RTO: 0 /100 WBC
PLATELET # BLD AUTO: 296 K/UL (ref 150–450)
PMV BLD AUTO: 9.6 FL (ref 9.2–12.9)
POCT GLUCOSE: 139 MG/DL (ref 70–110)
POCT GLUCOSE: 150 MG/DL (ref 70–110)
POCT GLUCOSE: 154 MG/DL (ref 70–110)
POCT GLUCOSE: 92 MG/DL (ref 70–110)
POTASSIUM SERPL-SCNC: 3.7 MMOL/L (ref 3.5–5.1)
PROT SERPL-MCNC: 5.5 G/DL (ref 6–8.4)
RBC # BLD AUTO: 2.82 M/UL (ref 4–5.4)
SODIUM SERPL-SCNC: 138 MMOL/L (ref 136–145)
WBC # BLD AUTO: 10.08 K/UL (ref 3.9–12.7)

## 2021-06-25 PROCEDURE — C9399 UNCLASSIFIED DRUGS OR BIOLOG: HCPCS | Performed by: INTERNAL MEDICINE

## 2021-06-25 PROCEDURE — 25000003 PHARM REV CODE 250: Performed by: INTERNAL MEDICINE

## 2021-06-25 PROCEDURE — 99233 PR SUBSEQUENT HOSPITAL CARE,LEVL III: ICD-10-PCS | Mod: ,,, | Performed by: INTERNAL MEDICINE

## 2021-06-25 PROCEDURE — 63600175 PHARM REV CODE 636 W HCPCS: Performed by: HOSPITALIST

## 2021-06-25 PROCEDURE — 93010 ELECTROCARDIOGRAM REPORT: CPT | Mod: ,,, | Performed by: INTERNAL MEDICINE

## 2021-06-25 PROCEDURE — 97530 THERAPEUTIC ACTIVITIES: CPT | Mod: CO

## 2021-06-25 PROCEDURE — 85025 COMPLETE CBC W/AUTO DIFF WBC: CPT | Performed by: PHYSICIAN ASSISTANT

## 2021-06-25 PROCEDURE — 99356 PR PROLONGED SERV,INPATIENT,1ST HR: ICD-10-PCS | Mod: ,,, | Performed by: INTERNAL MEDICINE

## 2021-06-25 PROCEDURE — 99356 PR PROLONGED SERV,INPATIENT,1ST HR: CPT | Mod: ,,, | Performed by: INTERNAL MEDICINE

## 2021-06-25 PROCEDURE — 25000003 PHARM REV CODE 250: Performed by: PHYSICIAN ASSISTANT

## 2021-06-25 PROCEDURE — 99233 SBSQ HOSP IP/OBS HIGH 50: CPT | Mod: ,,, | Performed by: INTERNAL MEDICINE

## 2021-06-25 PROCEDURE — 20600001 HC STEP DOWN PRIVATE ROOM

## 2021-06-25 PROCEDURE — 93010 EKG 12-LEAD: ICD-10-PCS | Mod: ,,, | Performed by: INTERNAL MEDICINE

## 2021-06-25 PROCEDURE — 83735 ASSAY OF MAGNESIUM: CPT | Performed by: PHYSICIAN ASSISTANT

## 2021-06-25 PROCEDURE — 80053 COMPREHEN METABOLIC PANEL: CPT | Performed by: PHYSICIAN ASSISTANT

## 2021-06-25 PROCEDURE — 97110 THERAPEUTIC EXERCISES: CPT | Mod: CO

## 2021-06-25 PROCEDURE — 93005 ELECTROCARDIOGRAM TRACING: CPT

## 2021-06-25 RX ORDER — TALC
6 POWDER (GRAM) TOPICAL NIGHTLY PRN
Refills: 0 | Status: ON HOLD
Start: 2021-06-25 | End: 2023-05-02 | Stop reason: CLARIF

## 2021-06-25 RX ORDER — OXYCODONE HYDROCHLORIDE 5 MG/1
5 TABLET ORAL EVERY 4 HOURS PRN
Qty: 28 TABLET | Refills: 0 | Status: SHIPPED | OUTPATIENT
Start: 2021-06-25 | End: 2021-07-20 | Stop reason: SDUPTHER

## 2021-06-25 RX ORDER — SODIUM BICARBONATE 650 MG/1
650 TABLET ORAL 3 TIMES DAILY
Refills: 3
Start: 2021-06-25 | End: 2021-07-01 | Stop reason: HOSPADM

## 2021-06-25 RX ORDER — ACETAMINOPHEN 500 MG
1000 TABLET ORAL EVERY 8 HOURS PRN
Refills: 0
Start: 2021-06-25 | End: 2023-04-21

## 2021-06-25 RX ORDER — FUROSEMIDE 80 MG/1
80 TABLET ORAL 2 TIMES DAILY
Qty: 60 TABLET | Refills: 11
Start: 2021-06-25 | End: 2021-07-01 | Stop reason: HOSPADM

## 2021-06-25 RX ORDER — SODIUM BICARBONATE 650 MG/1
650 TABLET ORAL 3 TIMES DAILY
Status: DISCONTINUED | OUTPATIENT
Start: 2021-06-25 | End: 2021-07-01

## 2021-06-25 RX ORDER — LANOLIN ALCOHOL/MO/W.PET/CERES
400 CREAM (GRAM) TOPICAL 2 TIMES DAILY
Refills: 0
Start: 2021-06-25 | End: 2023-04-21

## 2021-06-25 RX ADMIN — ALLOPURINOL 100 MG: 100 TABLET ORAL at 08:06

## 2021-06-25 RX ADMIN — MAGNESIUM OXIDE TAB 400 MG (241.3 MG ELEMENTAL MG) 400 MG: 400 (241.3 MG) TAB at 08:06

## 2021-06-25 RX ADMIN — OXYCODONE HYDROCHLORIDE 10 MG: 10 TABLET ORAL at 02:06

## 2021-06-25 RX ADMIN — MIRTAZAPINE 7.5 MG: 7.5 TABLET ORAL at 08:06

## 2021-06-25 RX ADMIN — METRONIDAZOLE 500 MG: 500 TABLET ORAL at 05:06

## 2021-06-25 RX ADMIN — INSULIN ASPART 5 UNITS: 100 INJECTION, SOLUTION INTRAVENOUS; SUBCUTANEOUS at 08:06

## 2021-06-25 RX ADMIN — INSULIN ASPART 5 UNITS: 100 INJECTION, SOLUTION INTRAVENOUS; SUBCUTANEOUS at 05:06

## 2021-06-25 RX ADMIN — LEVOFLOXACIN 750 MG: 750 TABLET, FILM COATED ORAL at 08:06

## 2021-06-25 RX ADMIN — METRONIDAZOLE 500 MG: 500 TABLET ORAL at 08:06

## 2021-06-25 RX ADMIN — HEPARIN SODIUM 5000 UNITS: 5000 INJECTION INTRAVENOUS; SUBCUTANEOUS at 02:06

## 2021-06-25 RX ADMIN — SODIUM BICARBONATE 650 MG TABLET 650 MG: at 02:06

## 2021-06-25 RX ADMIN — ESCITALOPRAM OXALATE 10 MG: 10 TABLET ORAL at 08:06

## 2021-06-25 RX ADMIN — FUROSEMIDE 120 MG: 80 TABLET ORAL at 05:06

## 2021-06-25 RX ADMIN — INSULIN DETEMIR 8 UNITS: 100 INJECTION, SOLUTION SUBCUTANEOUS at 08:06

## 2021-06-25 RX ADMIN — OXYCODONE HYDROCHLORIDE 10 MG: 10 TABLET ORAL at 05:06

## 2021-06-25 RX ADMIN — ATORVASTATIN CALCIUM 80 MG: 40 TABLET, FILM COATED ORAL at 08:06

## 2021-06-25 RX ADMIN — OXYCODONE 5 MG: 5 TABLET ORAL at 08:06

## 2021-06-25 RX ADMIN — INSULIN ASPART 5 UNITS: 100 INJECTION, SOLUTION INTRAVENOUS; SUBCUTANEOUS at 12:06

## 2021-06-25 RX ADMIN — SODIUM BICARBONATE 650 MG TABLET 650 MG: at 08:06

## 2021-06-25 RX ADMIN — FUROSEMIDE 80 MG: 80 TABLET ORAL at 08:06

## 2021-06-25 RX ADMIN — METRONIDAZOLE 500 MG: 500 TABLET ORAL at 02:06

## 2021-06-25 RX ADMIN — SODIUM BICARBONATE 650 MG TABLET 650 MG: at 09:06

## 2021-06-25 RX ADMIN — COLLAGENASE SANTYL: 250 OINTMENT TOPICAL at 08:06

## 2021-06-25 RX ADMIN — HEPARIN SODIUM 5000 UNITS: 5000 INJECTION INTRAVENOUS; SUBCUTANEOUS at 05:06

## 2021-06-25 RX ADMIN — HEPARIN SODIUM 5000 UNITS: 5000 INJECTION INTRAVENOUS; SUBCUTANEOUS at 08:06

## 2021-06-26 LAB
ALBUMIN SERPL BCP-MCNC: 1.3 G/DL (ref 3.5–5.2)
ALP SERPL-CCNC: 155 U/L (ref 55–135)
ALT SERPL W/O P-5'-P-CCNC: 7 U/L (ref 10–44)
ANION GAP SERPL CALC-SCNC: 8 MMOL/L (ref 8–16)
AST SERPL-CCNC: 39 U/L (ref 10–40)
BASOPHILS # BLD AUTO: 0.06 K/UL (ref 0–0.2)
BASOPHILS NFR BLD: 0.7 % (ref 0–1.9)
BILIRUB SERPL-MCNC: 0.3 MG/DL (ref 0.1–1)
BUN SERPL-MCNC: 16 MG/DL (ref 6–20)
CALCIUM SERPL-MCNC: 7.9 MG/DL (ref 8.7–10.5)
CHLORIDE SERPL-SCNC: 109 MMOL/L (ref 95–110)
CO2 SERPL-SCNC: 22 MMOL/L (ref 23–29)
CREAT SERPL-MCNC: 1 MG/DL (ref 0.5–1.4)
DIFFERENTIAL METHOD: ABNORMAL
EOSINOPHIL # BLD AUTO: 0.4 K/UL (ref 0–0.5)
EOSINOPHIL NFR BLD: 4.2 % (ref 0–8)
ERYTHROCYTE [DISTWIDTH] IN BLOOD BY AUTOMATED COUNT: 18.6 % (ref 11.5–14.5)
EST. GFR  (AFRICAN AMERICAN): >60 ML/MIN/1.73 M^2
EST. GFR  (NON AFRICAN AMERICAN): >60 ML/MIN/1.73 M^2
GLUCOSE SERPL-MCNC: 96 MG/DL (ref 70–110)
HCT VFR BLD AUTO: 27.8 % (ref 37–48.5)
HGB BLD-MCNC: 8.4 G/DL (ref 12–16)
IMM GRANULOCYTES # BLD AUTO: 0.04 K/UL (ref 0–0.04)
IMM GRANULOCYTES NFR BLD AUTO: 0.5 % (ref 0–0.5)
LYMPHOCYTES # BLD AUTO: 1.3 K/UL (ref 1–4.8)
LYMPHOCYTES NFR BLD: 15.7 % (ref 18–48)
MAGNESIUM SERPL-MCNC: 1.6 MG/DL (ref 1.6–2.6)
MCH RBC QN AUTO: 28.7 PG (ref 27–31)
MCHC RBC AUTO-ENTMCNC: 30.2 G/DL (ref 32–36)
MCV RBC AUTO: 95 FL (ref 82–98)
MONOCYTES # BLD AUTO: 0.7 K/UL (ref 0.3–1)
MONOCYTES NFR BLD: 8.6 % (ref 4–15)
NEUTROPHILS # BLD AUTO: 6 K/UL (ref 1.8–7.7)
NEUTROPHILS NFR BLD: 70.3 % (ref 38–73)
NRBC BLD-RTO: 0 /100 WBC
PLATELET # BLD AUTO: 329 K/UL (ref 150–450)
PMV BLD AUTO: 10.1 FL (ref 9.2–12.9)
POCT GLUCOSE: 119 MG/DL (ref 70–110)
POCT GLUCOSE: 132 MG/DL (ref 70–110)
POCT GLUCOSE: 57 MG/DL (ref 70–110)
POCT GLUCOSE: 64 MG/DL (ref 70–110)
POCT GLUCOSE: 90 MG/DL (ref 70–110)
POTASSIUM SERPL-SCNC: 4.3 MMOL/L (ref 3.5–5.1)
PROT SERPL-MCNC: 6 G/DL (ref 6–8.4)
RBC # BLD AUTO: 2.93 M/UL (ref 4–5.4)
SODIUM SERPL-SCNC: 139 MMOL/L (ref 136–145)
WBC # BLD AUTO: 8.56 K/UL (ref 3.9–12.7)

## 2021-06-26 PROCEDURE — 25000003 PHARM REV CODE 250: Performed by: INTERNAL MEDICINE

## 2021-06-26 PROCEDURE — 63600175 PHARM REV CODE 636 W HCPCS: Performed by: HOSPITALIST

## 2021-06-26 PROCEDURE — 99232 PR SUBSEQUENT HOSPITAL CARE,LEVL II: ICD-10-PCS | Mod: ,,, | Performed by: INTERNAL MEDICINE

## 2021-06-26 PROCEDURE — 20600001 HC STEP DOWN PRIVATE ROOM

## 2021-06-26 PROCEDURE — 25000003 PHARM REV CODE 250: Performed by: PHYSICIAN ASSISTANT

## 2021-06-26 PROCEDURE — 99232 SBSQ HOSP IP/OBS MODERATE 35: CPT | Mod: ,,, | Performed by: INTERNAL MEDICINE

## 2021-06-26 PROCEDURE — 85025 COMPLETE CBC W/AUTO DIFF WBC: CPT | Performed by: PHYSICIAN ASSISTANT

## 2021-06-26 PROCEDURE — 80053 COMPREHEN METABOLIC PANEL: CPT | Performed by: PHYSICIAN ASSISTANT

## 2021-06-26 PROCEDURE — 83735 ASSAY OF MAGNESIUM: CPT | Performed by: PHYSICIAN ASSISTANT

## 2021-06-26 PROCEDURE — C9399 UNCLASSIFIED DRUGS OR BIOLOG: HCPCS | Performed by: INTERNAL MEDICINE

## 2021-06-26 RX ADMIN — ATORVASTATIN CALCIUM 80 MG: 40 TABLET, FILM COATED ORAL at 08:06

## 2021-06-26 RX ADMIN — INSULIN ASPART 5 UNITS: 100 INJECTION, SOLUTION INTRAVENOUS; SUBCUTANEOUS at 06:06

## 2021-06-26 RX ADMIN — SODIUM BICARBONATE 650 MG TABLET 650 MG: at 08:06

## 2021-06-26 RX ADMIN — MELATONIN TAB 3 MG 6 MG: 3 TAB at 08:06

## 2021-06-26 RX ADMIN — LEVOFLOXACIN 750 MG: 750 TABLET, FILM COATED ORAL at 08:06

## 2021-06-26 RX ADMIN — MAGNESIUM OXIDE TAB 400 MG (241.3 MG ELEMENTAL MG) 400 MG: 400 (241.3 MG) TAB at 08:06

## 2021-06-26 RX ADMIN — INSULIN DETEMIR 8 UNITS: 100 INJECTION, SOLUTION SUBCUTANEOUS at 08:06

## 2021-06-26 RX ADMIN — METRONIDAZOLE 500 MG: 500 TABLET ORAL at 03:06

## 2021-06-26 RX ADMIN — METRONIDAZOLE 500 MG: 500 TABLET ORAL at 08:06

## 2021-06-26 RX ADMIN — ESCITALOPRAM OXALATE 10 MG: 10 TABLET ORAL at 08:06

## 2021-06-26 RX ADMIN — OXYCODONE HYDROCHLORIDE 10 MG: 10 TABLET ORAL at 06:06

## 2021-06-26 RX ADMIN — COLLAGENASE SANTYL: 250 OINTMENT TOPICAL at 08:06

## 2021-06-26 RX ADMIN — HEPARIN SODIUM 5000 UNITS: 5000 INJECTION INTRAVENOUS; SUBCUTANEOUS at 06:06

## 2021-06-26 RX ADMIN — HEPARIN SODIUM 5000 UNITS: 5000 INJECTION INTRAVENOUS; SUBCUTANEOUS at 03:06

## 2021-06-26 RX ADMIN — OXYCODONE HYDROCHLORIDE 10 MG: 10 TABLET ORAL at 08:06

## 2021-06-26 RX ADMIN — HEPARIN SODIUM 5000 UNITS: 5000 INJECTION INTRAVENOUS; SUBCUTANEOUS at 08:06

## 2021-06-26 RX ADMIN — ALLOPURINOL 100 MG: 100 TABLET ORAL at 08:06

## 2021-06-26 RX ADMIN — INSULIN ASPART 5 UNITS: 100 INJECTION, SOLUTION INTRAVENOUS; SUBCUTANEOUS at 07:06

## 2021-06-26 RX ADMIN — METRONIDAZOLE 500 MG: 500 TABLET ORAL at 06:06

## 2021-06-26 RX ADMIN — SODIUM BICARBONATE 650 MG TABLET 650 MG: at 03:06

## 2021-06-26 RX ADMIN — OXYCODONE HYDROCHLORIDE 10 MG: 10 TABLET ORAL at 03:06

## 2021-06-26 RX ADMIN — FUROSEMIDE 120 MG: 80 TABLET ORAL at 08:06

## 2021-06-26 RX ADMIN — MIRTAZAPINE 7.5 MG: 7.5 TABLET ORAL at 08:06

## 2021-06-26 RX ADMIN — FUROSEMIDE 120 MG: 80 TABLET ORAL at 06:06

## 2021-06-27 LAB
ALBUMIN SERPL BCP-MCNC: 1.2 G/DL (ref 3.5–5.2)
ALP SERPL-CCNC: 145 U/L (ref 55–135)
ALT SERPL W/O P-5'-P-CCNC: 6 U/L (ref 10–44)
ANION GAP SERPL CALC-SCNC: 6 MMOL/L (ref 8–16)
AST SERPL-CCNC: 28 U/L (ref 10–40)
BASOPHILS # BLD AUTO: 0.04 K/UL (ref 0–0.2)
BASOPHILS # BLD AUTO: 0.04 K/UL (ref 0–0.2)
BASOPHILS NFR BLD: 0.5 % (ref 0–1.9)
BASOPHILS NFR BLD: 0.5 % (ref 0–1.9)
BILIRUB SERPL-MCNC: 0.3 MG/DL (ref 0.1–1)
BUN SERPL-MCNC: 15 MG/DL (ref 6–20)
CALCIUM SERPL-MCNC: 7.8 MG/DL (ref 8.7–10.5)
CHLORIDE SERPL-SCNC: 106 MMOL/L (ref 95–110)
CO2 SERPL-SCNC: 24 MMOL/L (ref 23–29)
CREAT SERPL-MCNC: 1 MG/DL (ref 0.5–1.4)
DIFFERENTIAL METHOD: ABNORMAL
DIFFERENTIAL METHOD: ABNORMAL
EOSINOPHIL # BLD AUTO: 0.3 K/UL (ref 0–0.5)
EOSINOPHIL # BLD AUTO: 0.3 K/UL (ref 0–0.5)
EOSINOPHIL NFR BLD: 3.4 % (ref 0–8)
EOSINOPHIL NFR BLD: 3.6 % (ref 0–8)
ERYTHROCYTE [DISTWIDTH] IN BLOOD BY AUTOMATED COUNT: 18.3 % (ref 11.5–14.5)
ERYTHROCYTE [DISTWIDTH] IN BLOOD BY AUTOMATED COUNT: 18.6 % (ref 11.5–14.5)
EST. GFR  (AFRICAN AMERICAN): >60 ML/MIN/1.73 M^2
EST. GFR  (NON AFRICAN AMERICAN): >60 ML/MIN/1.73 M^2
GLUCOSE SERPL-MCNC: 101 MG/DL (ref 70–110)
HCT VFR BLD AUTO: 26.8 % (ref 37–48.5)
HCT VFR BLD AUTO: 27.6 % (ref 37–48.5)
HGB BLD-MCNC: 8.3 G/DL (ref 12–16)
HGB BLD-MCNC: 8.4 G/DL (ref 12–16)
IMM GRANULOCYTES # BLD AUTO: 0.03 K/UL (ref 0–0.04)
IMM GRANULOCYTES # BLD AUTO: 0.03 K/UL (ref 0–0.04)
IMM GRANULOCYTES NFR BLD AUTO: 0.4 % (ref 0–0.5)
IMM GRANULOCYTES NFR BLD AUTO: 0.4 % (ref 0–0.5)
LYMPHOCYTES # BLD AUTO: 1.4 K/UL (ref 1–4.8)
LYMPHOCYTES # BLD AUTO: 1.4 K/UL (ref 1–4.8)
LYMPHOCYTES NFR BLD: 16.8 % (ref 18–48)
LYMPHOCYTES NFR BLD: 17 % (ref 18–48)
MAGNESIUM SERPL-MCNC: 1.4 MG/DL (ref 1.6–2.6)
MCH RBC QN AUTO: 29.2 PG (ref 27–31)
MCH RBC QN AUTO: 30.2 PG (ref 27–31)
MCHC RBC AUTO-ENTMCNC: 30.1 G/DL (ref 32–36)
MCHC RBC AUTO-ENTMCNC: 31.3 G/DL (ref 32–36)
MCV RBC AUTO: 96 FL (ref 82–98)
MCV RBC AUTO: 97 FL (ref 82–98)
MONOCYTES # BLD AUTO: 0.7 K/UL (ref 0.3–1)
MONOCYTES # BLD AUTO: 0.8 K/UL (ref 0.3–1)
MONOCYTES NFR BLD: 8.8 % (ref 4–15)
MONOCYTES NFR BLD: 9.9 % (ref 4–15)
NEUTROPHILS # BLD AUTO: 5.5 K/UL (ref 1.8–7.7)
NEUTROPHILS # BLD AUTO: 5.7 K/UL (ref 1.8–7.7)
NEUTROPHILS NFR BLD: 68.8 % (ref 38–73)
NEUTROPHILS NFR BLD: 69.9 % (ref 38–73)
NRBC BLD-RTO: 0 /100 WBC
NRBC BLD-RTO: 0 /100 WBC
PLATELET # BLD AUTO: 274 K/UL (ref 150–450)
PLATELET # BLD AUTO: 326 K/UL (ref 150–450)
PMV BLD AUTO: 9.8 FL (ref 9.2–12.9)
PMV BLD AUTO: 9.8 FL (ref 9.2–12.9)
POCT GLUCOSE: 106 MG/DL (ref 70–110)
POCT GLUCOSE: 185 MG/DL (ref 70–110)
POCT GLUCOSE: 61 MG/DL (ref 70–110)
POCT GLUCOSE: 81 MG/DL (ref 70–110)
POCT GLUCOSE: 84 MG/DL (ref 70–110)
POCT GLUCOSE: 89 MG/DL (ref 70–110)
POTASSIUM SERPL-SCNC: 3.6 MMOL/L (ref 3.5–5.1)
PROT SERPL-MCNC: 5.6 G/DL (ref 6–8.4)
RBC # BLD AUTO: 2.78 M/UL (ref 4–5.4)
RBC # BLD AUTO: 2.84 M/UL (ref 4–5.4)
SODIUM SERPL-SCNC: 136 MMOL/L (ref 136–145)
WBC # BLD AUTO: 7.93 K/UL (ref 3.9–12.7)
WBC # BLD AUTO: 8.28 K/UL (ref 3.9–12.7)

## 2021-06-27 PROCEDURE — 25000003 PHARM REV CODE 250: Performed by: INTERNAL MEDICINE

## 2021-06-27 PROCEDURE — 85025 COMPLETE CBC W/AUTO DIFF WBC: CPT | Mod: 91 | Performed by: INTERNAL MEDICINE

## 2021-06-27 PROCEDURE — 63600175 PHARM REV CODE 636 W HCPCS: Performed by: HOSPITALIST

## 2021-06-27 PROCEDURE — 80053 COMPREHEN METABOLIC PANEL: CPT | Performed by: INTERNAL MEDICINE

## 2021-06-27 PROCEDURE — 83735 ASSAY OF MAGNESIUM: CPT | Performed by: INTERNAL MEDICINE

## 2021-06-27 PROCEDURE — 20600001 HC STEP DOWN PRIVATE ROOM

## 2021-06-27 PROCEDURE — 99232 SBSQ HOSP IP/OBS MODERATE 35: CPT | Mod: ,,, | Performed by: INTERNAL MEDICINE

## 2021-06-27 PROCEDURE — 99232 PR SUBSEQUENT HOSPITAL CARE,LEVL II: ICD-10-PCS | Mod: ,,, | Performed by: INTERNAL MEDICINE

## 2021-06-27 PROCEDURE — 97530 THERAPEUTIC ACTIVITIES: CPT | Mod: CQ

## 2021-06-27 PROCEDURE — 25000003 PHARM REV CODE 250: Performed by: PHYSICIAN ASSISTANT

## 2021-06-27 PROCEDURE — 97110 THERAPEUTIC EXERCISES: CPT | Mod: CQ

## 2021-06-27 PROCEDURE — 85025 COMPLETE CBC W/AUTO DIFF WBC: CPT | Performed by: PHYSICIAN ASSISTANT

## 2021-06-27 RX ORDER — POTASSIUM CHLORIDE 20 MEQ/1
60 TABLET, EXTENDED RELEASE ORAL ONCE
Status: COMPLETED | OUTPATIENT
Start: 2021-06-27 | End: 2021-06-27

## 2021-06-27 RX ORDER — FUROSEMIDE 80 MG/1
160 TABLET ORAL 2 TIMES DAILY
Status: DISCONTINUED | OUTPATIENT
Start: 2021-06-27 | End: 2021-07-01 | Stop reason: HOSPADM

## 2021-06-27 RX ADMIN — ATORVASTATIN CALCIUM 80 MG: 40 TABLET, FILM COATED ORAL at 09:06

## 2021-06-27 RX ADMIN — LEVOFLOXACIN 750 MG: 750 TABLET, FILM COATED ORAL at 08:06

## 2021-06-27 RX ADMIN — COLLAGENASE SANTYL: 250 OINTMENT TOPICAL at 08:06

## 2021-06-27 RX ADMIN — POTASSIUM CHLORIDE 60 MEQ: 1500 TABLET, EXTENDED RELEASE ORAL at 03:06

## 2021-06-27 RX ADMIN — METRONIDAZOLE 500 MG: 500 TABLET ORAL at 03:06

## 2021-06-27 RX ADMIN — FUROSEMIDE 160 MG: 80 TABLET ORAL at 06:06

## 2021-06-27 RX ADMIN — MAGNESIUM OXIDE TAB 400 MG (241.3 MG ELEMENTAL MG) 400 MG: 400 (241.3 MG) TAB at 08:06

## 2021-06-27 RX ADMIN — SODIUM BICARBONATE 650 MG TABLET 650 MG: at 09:06

## 2021-06-27 RX ADMIN — MELATONIN TAB 3 MG 6 MG: 3 TAB at 09:06

## 2021-06-27 RX ADMIN — METRONIDAZOLE 500 MG: 500 TABLET ORAL at 09:06

## 2021-06-27 RX ADMIN — HEPARIN SODIUM 5000 UNITS: 5000 INJECTION INTRAVENOUS; SUBCUTANEOUS at 03:06

## 2021-06-27 RX ADMIN — INSULIN ASPART 5 UNITS: 100 INJECTION, SOLUTION INTRAVENOUS; SUBCUTANEOUS at 06:06

## 2021-06-27 RX ADMIN — ESCITALOPRAM OXALATE 10 MG: 10 TABLET ORAL at 08:06

## 2021-06-27 RX ADMIN — OXYCODONE 5 MG: 5 TABLET ORAL at 09:06

## 2021-06-27 RX ADMIN — MIRTAZAPINE 7.5 MG: 7.5 TABLET ORAL at 09:06

## 2021-06-27 RX ADMIN — INSULIN ASPART 5 UNITS: 100 INJECTION, SOLUTION INTRAVENOUS; SUBCUTANEOUS at 08:06

## 2021-06-27 RX ADMIN — INSULIN ASPART 5 UNITS: 100 INJECTION, SOLUTION INTRAVENOUS; SUBCUTANEOUS at 12:06

## 2021-06-27 RX ADMIN — INSULIN DETEMIR 8 UNITS: 100 INJECTION, SOLUTION SUBCUTANEOUS at 08:06

## 2021-06-27 RX ADMIN — HEPARIN SODIUM 5000 UNITS: 5000 INJECTION INTRAVENOUS; SUBCUTANEOUS at 09:06

## 2021-06-27 RX ADMIN — SODIUM BICARBONATE 650 MG TABLET 650 MG: at 08:06

## 2021-06-27 RX ADMIN — HEPARIN SODIUM 5000 UNITS: 5000 INJECTION INTRAVENOUS; SUBCUTANEOUS at 05:06

## 2021-06-27 RX ADMIN — OXYCODONE HYDROCHLORIDE 10 MG: 10 TABLET ORAL at 05:06

## 2021-06-27 RX ADMIN — METRONIDAZOLE 500 MG: 500 TABLET ORAL at 05:06

## 2021-06-27 RX ADMIN — FUROSEMIDE 120 MG: 80 TABLET ORAL at 08:06

## 2021-06-27 RX ADMIN — SODIUM BICARBONATE 650 MG TABLET 650 MG: at 03:06

## 2021-06-27 RX ADMIN — INSULIN ASPART 5 UNITS: 100 INJECTION, SOLUTION INTRAVENOUS; SUBCUTANEOUS at 07:06

## 2021-06-27 RX ADMIN — MAGNESIUM OXIDE TAB 400 MG (241.3 MG ELEMENTAL MG) 400 MG: 400 (241.3 MG) TAB at 09:06

## 2021-06-27 RX ADMIN — ALLOPURINOL 100 MG: 100 TABLET ORAL at 08:06

## 2021-06-28 LAB
POCT GLUCOSE: 106 MG/DL (ref 70–110)
POCT GLUCOSE: 134 MG/DL (ref 70–110)
POCT GLUCOSE: 146 MG/DL (ref 70–110)
POCT GLUCOSE: 65 MG/DL (ref 70–110)
POCT GLUCOSE: 94 MG/DL (ref 70–110)

## 2021-06-28 PROCEDURE — 97605 NEG PRS WND THER DME<=50SQCM: CPT

## 2021-06-28 PROCEDURE — 25000003 PHARM REV CODE 250: Performed by: PHYSICIAN ASSISTANT

## 2021-06-28 PROCEDURE — 63600175 PHARM REV CODE 636 W HCPCS: Performed by: HOSPITALIST

## 2021-06-28 PROCEDURE — 97112 NEUROMUSCULAR REEDUCATION: CPT | Mod: CO

## 2021-06-28 PROCEDURE — 97110 THERAPEUTIC EXERCISES: CPT | Mod: CO

## 2021-06-28 PROCEDURE — 25000003 PHARM REV CODE 250: Performed by: INTERNAL MEDICINE

## 2021-06-28 PROCEDURE — 99232 SBSQ HOSP IP/OBS MODERATE 35: CPT | Mod: ,,, | Performed by: INTERNAL MEDICINE

## 2021-06-28 PROCEDURE — 97530 THERAPEUTIC ACTIVITIES: CPT | Mod: CO

## 2021-06-28 PROCEDURE — 99232 PR SUBSEQUENT HOSPITAL CARE,LEVL II: ICD-10-PCS | Mod: ,,, | Performed by: INTERNAL MEDICINE

## 2021-06-28 PROCEDURE — 20600001 HC STEP DOWN PRIVATE ROOM

## 2021-06-28 RX ADMIN — MAGNESIUM OXIDE TAB 400 MG (241.3 MG ELEMENTAL MG) 400 MG: 400 (241.3 MG) TAB at 08:06

## 2021-06-28 RX ADMIN — OXYCODONE HYDROCHLORIDE 10 MG: 10 TABLET ORAL at 10:06

## 2021-06-28 RX ADMIN — COLLAGENASE SANTYL: 250 OINTMENT TOPICAL at 08:06

## 2021-06-28 RX ADMIN — OXYCODONE HYDROCHLORIDE 10 MG: 10 TABLET ORAL at 02:06

## 2021-06-28 RX ADMIN — OXYCODONE HYDROCHLORIDE 10 MG: 10 TABLET ORAL at 08:06

## 2021-06-28 RX ADMIN — METRONIDAZOLE 500 MG: 500 TABLET ORAL at 02:06

## 2021-06-28 RX ADMIN — FUROSEMIDE 160 MG: 80 TABLET ORAL at 08:06

## 2021-06-28 RX ADMIN — METRONIDAZOLE 500 MG: 500 TABLET ORAL at 05:06

## 2021-06-28 RX ADMIN — HEPARIN SODIUM 5000 UNITS: 5000 INJECTION INTRAVENOUS; SUBCUTANEOUS at 02:06

## 2021-06-28 RX ADMIN — SODIUM BICARBONATE 650 MG TABLET 650 MG: at 08:06

## 2021-06-28 RX ADMIN — HEPARIN SODIUM 5000 UNITS: 5000 INJECTION INTRAVENOUS; SUBCUTANEOUS at 10:06

## 2021-06-28 RX ADMIN — ATORVASTATIN CALCIUM 80 MG: 40 TABLET, FILM COATED ORAL at 10:06

## 2021-06-28 RX ADMIN — METRONIDAZOLE 500 MG: 500 TABLET ORAL at 10:06

## 2021-06-28 RX ADMIN — INSULIN DETEMIR 8 UNITS: 100 INJECTION, SOLUTION SUBCUTANEOUS at 08:06

## 2021-06-28 RX ADMIN — MIRTAZAPINE 7.5 MG: 7.5 TABLET ORAL at 10:06

## 2021-06-28 RX ADMIN — ESCITALOPRAM OXALATE 10 MG: 10 TABLET ORAL at 08:06

## 2021-06-28 RX ADMIN — SODIUM BICARBONATE 650 MG TABLET 650 MG: at 02:06

## 2021-06-28 RX ADMIN — LEVOFLOXACIN 750 MG: 750 TABLET, FILM COATED ORAL at 08:06

## 2021-06-28 RX ADMIN — MELATONIN TAB 3 MG 6 MG: 3 TAB at 10:06

## 2021-06-28 RX ADMIN — INSULIN ASPART 5 UNITS: 100 INJECTION, SOLUTION INTRAVENOUS; SUBCUTANEOUS at 12:06

## 2021-06-28 RX ADMIN — HEPARIN SODIUM 5000 UNITS: 5000 INJECTION INTRAVENOUS; SUBCUTANEOUS at 05:06

## 2021-06-28 RX ADMIN — FUROSEMIDE 160 MG: 80 TABLET ORAL at 05:06

## 2021-06-28 RX ADMIN — SODIUM BICARBONATE 650 MG TABLET 650 MG: at 10:06

## 2021-06-28 RX ADMIN — ALLOPURINOL 100 MG: 100 TABLET ORAL at 08:06

## 2021-06-28 RX ADMIN — MAGNESIUM OXIDE TAB 400 MG (241.3 MG ELEMENTAL MG) 400 MG: 400 (241.3 MG) TAB at 10:06

## 2021-06-29 LAB
FUNGUS SPEC CULT: NORMAL
POCT GLUCOSE: 134 MG/DL (ref 70–110)
POCT GLUCOSE: 168 MG/DL (ref 70–110)
POCT GLUCOSE: 219 MG/DL (ref 70–110)
POCT GLUCOSE: 97 MG/DL (ref 70–110)

## 2021-06-29 PROCEDURE — 99233 SBSQ HOSP IP/OBS HIGH 50: CPT | Mod: ,,, | Performed by: INTERNAL MEDICINE

## 2021-06-29 PROCEDURE — 63600175 PHARM REV CODE 636 W HCPCS: Performed by: HOSPITALIST

## 2021-06-29 PROCEDURE — 25000003 PHARM REV CODE 250: Performed by: INTERNAL MEDICINE

## 2021-06-29 PROCEDURE — 97110 THERAPEUTIC EXERCISES: CPT

## 2021-06-29 PROCEDURE — 20600001 HC STEP DOWN PRIVATE ROOM

## 2021-06-29 PROCEDURE — 99222 1ST HOSP IP/OBS MODERATE 55: CPT | Mod: ,,, | Performed by: NURSE PRACTITIONER

## 2021-06-29 PROCEDURE — 90832 PSYTX W PT 30 MINUTES: CPT | Mod: ,,, | Performed by: STUDENT IN AN ORGANIZED HEALTH CARE EDUCATION/TRAINING PROGRAM

## 2021-06-29 PROCEDURE — 99233 PR SUBSEQUENT HOSPITAL CARE,LEVL III: ICD-10-PCS | Mod: ,,, | Performed by: INTERNAL MEDICINE

## 2021-06-29 PROCEDURE — 97530 THERAPEUTIC ACTIVITIES: CPT

## 2021-06-29 PROCEDURE — 25000003 PHARM REV CODE 250: Performed by: PHYSICIAN ASSISTANT

## 2021-06-29 PROCEDURE — 99222 PR INITIAL HOSPITAL CARE,LEVL II: ICD-10-PCS | Mod: ,,, | Performed by: NURSE PRACTITIONER

## 2021-06-29 PROCEDURE — 97530 THERAPEUTIC ACTIVITIES: CPT | Mod: CO

## 2021-06-29 PROCEDURE — 97112 NEUROMUSCULAR REEDUCATION: CPT | Mod: CO

## 2021-06-29 PROCEDURE — 90832 PR PSYCHOTHERAPY W/PATIENT, 30 MIN: ICD-10-PCS | Mod: ,,, | Performed by: STUDENT IN AN ORGANIZED HEALTH CARE EDUCATION/TRAINING PROGRAM

## 2021-06-29 RX ORDER — POTASSIUM CHLORIDE 20 MEQ/1
40 TABLET, EXTENDED RELEASE ORAL ONCE
Status: COMPLETED | OUTPATIENT
Start: 2021-06-30 | End: 2021-06-30

## 2021-06-29 RX ORDER — LANOLIN ALCOHOL/MO/W.PET/CERES
400 CREAM (GRAM) TOPICAL ONCE
Status: COMPLETED | OUTPATIENT
Start: 2021-06-30 | End: 2021-06-30

## 2021-06-29 RX ADMIN — OXYCODONE HYDROCHLORIDE 10 MG: 10 TABLET ORAL at 08:06

## 2021-06-29 RX ADMIN — INSULIN DETEMIR 8 UNITS: 100 INJECTION, SOLUTION SUBCUTANEOUS at 07:06

## 2021-06-29 RX ADMIN — METRONIDAZOLE 500 MG: 500 TABLET ORAL at 01:06

## 2021-06-29 RX ADMIN — METRONIDAZOLE 500 MG: 500 TABLET ORAL at 09:06

## 2021-06-29 RX ADMIN — SODIUM BICARBONATE 650 MG TABLET 650 MG: at 07:06

## 2021-06-29 RX ADMIN — MAGNESIUM OXIDE TAB 400 MG (241.3 MG ELEMENTAL MG) 400 MG: 400 (241.3 MG) TAB at 09:06

## 2021-06-29 RX ADMIN — HEPARIN SODIUM 5000 UNITS: 5000 INJECTION INTRAVENOUS; SUBCUTANEOUS at 01:06

## 2021-06-29 RX ADMIN — ATORVASTATIN CALCIUM 80 MG: 40 TABLET, FILM COATED ORAL at 09:06

## 2021-06-29 RX ADMIN — ESCITALOPRAM OXALATE 10 MG: 10 TABLET ORAL at 07:06

## 2021-06-29 RX ADMIN — MELATONIN TAB 3 MG 6 MG: 3 TAB at 09:06

## 2021-06-29 RX ADMIN — LEVOFLOXACIN 750 MG: 750 TABLET, FILM COATED ORAL at 07:06

## 2021-06-29 RX ADMIN — ALLOPURINOL 100 MG: 100 TABLET ORAL at 07:06

## 2021-06-29 RX ADMIN — MIRTAZAPINE 7.5 MG: 7.5 TABLET ORAL at 09:06

## 2021-06-29 RX ADMIN — FUROSEMIDE 160 MG: 80 TABLET ORAL at 05:06

## 2021-06-29 RX ADMIN — INSULIN ASPART 5 UNITS: 100 INJECTION, SOLUTION INTRAVENOUS; SUBCUTANEOUS at 05:06

## 2021-06-29 RX ADMIN — SODIUM BICARBONATE 650 MG TABLET 650 MG: at 09:06

## 2021-06-29 RX ADMIN — METRONIDAZOLE 500 MG: 500 TABLET ORAL at 05:06

## 2021-06-29 RX ADMIN — FUROSEMIDE 160 MG: 80 TABLET ORAL at 07:06

## 2021-06-29 RX ADMIN — OXYCODONE HYDROCHLORIDE 10 MG: 10 TABLET ORAL at 01:06

## 2021-06-29 RX ADMIN — HEPARIN SODIUM 5000 UNITS: 5000 INJECTION INTRAVENOUS; SUBCUTANEOUS at 05:06

## 2021-06-29 RX ADMIN — SODIUM BICARBONATE 650 MG TABLET 650 MG: at 02:06

## 2021-06-29 RX ADMIN — HEPARIN SODIUM 5000 UNITS: 5000 INJECTION INTRAVENOUS; SUBCUTANEOUS at 09:06

## 2021-06-29 RX ADMIN — COLLAGENASE SANTYL: 250 OINTMENT TOPICAL at 07:06

## 2021-06-29 RX ADMIN — MAGNESIUM OXIDE TAB 400 MG (241.3 MG ELEMENTAL MG) 400 MG: 400 (241.3 MG) TAB at 07:06

## 2021-06-30 ENCOUNTER — TELEPHONE (OUTPATIENT)
Dept: OBSTETRICS AND GYNECOLOGY | Facility: CLINIC | Age: 56
End: 2021-06-30

## 2021-06-30 PROBLEM — N95.0 POSTMENOPAUSAL BLEEDING: Status: ACTIVE | Noted: 2021-06-30

## 2021-06-30 PROBLEM — D25.9 UTERINE FIBROID: Status: ACTIVE | Noted: 2021-06-30

## 2021-06-30 LAB
ANION GAP SERPL CALC-SCNC: 6 MMOL/L (ref 8–16)
BASOPHILS # BLD AUTO: 0.04 K/UL (ref 0–0.2)
BASOPHILS NFR BLD: 0.5 % (ref 0–1.9)
BUN SERPL-MCNC: 16 MG/DL (ref 6–20)
CALCIUM SERPL-MCNC: 7.9 MG/DL (ref 8.7–10.5)
CHLORIDE SERPL-SCNC: 105 MMOL/L (ref 95–110)
CO2 SERPL-SCNC: 23 MMOL/L (ref 23–29)
CREAT SERPL-MCNC: 1 MG/DL (ref 0.5–1.4)
DIFFERENTIAL METHOD: ABNORMAL
EOSINOPHIL # BLD AUTO: 0.3 K/UL (ref 0–0.5)
EOSINOPHIL NFR BLD: 3.7 % (ref 0–8)
ERYTHROCYTE [DISTWIDTH] IN BLOOD BY AUTOMATED COUNT: 18.2 % (ref 11.5–14.5)
EST. GFR  (AFRICAN AMERICAN): >60 ML/MIN/1.73 M^2
EST. GFR  (NON AFRICAN AMERICAN): >60 ML/MIN/1.73 M^2
GLUCOSE SERPL-MCNC: 189 MG/DL (ref 70–110)
HCT VFR BLD AUTO: 25.6 % (ref 37–48.5)
HGB BLD-MCNC: 8.4 G/DL (ref 12–16)
IMM GRANULOCYTES # BLD AUTO: 0.04 K/UL (ref 0–0.04)
IMM GRANULOCYTES NFR BLD AUTO: 0.5 % (ref 0–0.5)
LYMPHOCYTES # BLD AUTO: 1.2 K/UL (ref 1–4.8)
LYMPHOCYTES NFR BLD: 16.5 % (ref 18–48)
MCH RBC QN AUTO: 30.8 PG (ref 27–31)
MCHC RBC AUTO-ENTMCNC: 32.8 G/DL (ref 32–36)
MCV RBC AUTO: 94 FL (ref 82–98)
MONOCYTES # BLD AUTO: 0.8 K/UL (ref 0.3–1)
MONOCYTES NFR BLD: 10.1 % (ref 4–15)
NEUTROPHILS # BLD AUTO: 5.1 K/UL (ref 1.8–7.7)
NEUTROPHILS NFR BLD: 68.7 % (ref 38–73)
NRBC BLD-RTO: 0 /100 WBC
PLATELET # BLD AUTO: 328 K/UL (ref 150–450)
PMV BLD AUTO: 10.4 FL (ref 9.2–12.9)
POCT GLUCOSE: 152 MG/DL (ref 70–110)
POCT GLUCOSE: 164 MG/DL (ref 70–110)
POCT GLUCOSE: 219 MG/DL (ref 70–110)
POCT GLUCOSE: 259 MG/DL (ref 70–110)
POTASSIUM SERPL-SCNC: 4.5 MMOL/L (ref 3.5–5.1)
RBC # BLD AUTO: 2.73 M/UL (ref 4–5.4)
SODIUM SERPL-SCNC: 134 MMOL/L (ref 136–145)
WBC # BLD AUTO: 7.5 K/UL (ref 3.9–12.7)

## 2021-06-30 PROCEDURE — 63600175 PHARM REV CODE 636 W HCPCS: Performed by: HOSPITALIST

## 2021-06-30 PROCEDURE — 80048 BASIC METABOLIC PNL TOTAL CA: CPT | Performed by: INTERNAL MEDICINE

## 2021-06-30 PROCEDURE — 25000003 PHARM REV CODE 250: Performed by: INTERNAL MEDICINE

## 2021-06-30 PROCEDURE — 85025 COMPLETE CBC W/AUTO DIFF WBC: CPT | Performed by: INTERNAL MEDICINE

## 2021-06-30 PROCEDURE — 99232 SBSQ HOSP IP/OBS MODERATE 35: CPT | Mod: ,,, | Performed by: NURSE PRACTITIONER

## 2021-06-30 PROCEDURE — 99232 PR SUBSEQUENT HOSPITAL CARE,LEVL II: ICD-10-PCS | Mod: ,,, | Performed by: NURSE PRACTITIONER

## 2021-06-30 PROCEDURE — 25000003 PHARM REV CODE 250: Performed by: PHYSICIAN ASSISTANT

## 2021-06-30 PROCEDURE — 99233 SBSQ HOSP IP/OBS HIGH 50: CPT | Mod: ,,, | Performed by: INTERNAL MEDICINE

## 2021-06-30 PROCEDURE — 36415 COLL VENOUS BLD VENIPUNCTURE: CPT | Performed by: INTERNAL MEDICINE

## 2021-06-30 PROCEDURE — 20600001 HC STEP DOWN PRIVATE ROOM

## 2021-06-30 PROCEDURE — 99233 PR SUBSEQUENT HOSPITAL CARE,LEVL III: ICD-10-PCS | Mod: ,,, | Performed by: INTERNAL MEDICINE

## 2021-06-30 RX ADMIN — SODIUM BICARBONATE 650 MG TABLET 650 MG: at 08:06

## 2021-06-30 RX ADMIN — INSULIN ASPART 5 UNITS: 100 INJECTION, SOLUTION INTRAVENOUS; SUBCUTANEOUS at 05:06

## 2021-06-30 RX ADMIN — COLLAGENASE SANTYL: 250 OINTMENT TOPICAL at 08:06

## 2021-06-30 RX ADMIN — INSULIN ASPART 5 UNITS: 100 INJECTION, SOLUTION INTRAVENOUS; SUBCUTANEOUS at 12:06

## 2021-06-30 RX ADMIN — INSULIN DETEMIR 8 UNITS: 100 INJECTION, SOLUTION SUBCUTANEOUS at 08:06

## 2021-06-30 RX ADMIN — Medication 400 MG: at 08:06

## 2021-06-30 RX ADMIN — OXYCODONE HYDROCHLORIDE 10 MG: 10 TABLET ORAL at 09:06

## 2021-06-30 RX ADMIN — HEPARIN SODIUM 5000 UNITS: 5000 INJECTION INTRAVENOUS; SUBCUTANEOUS at 06:06

## 2021-06-30 RX ADMIN — ESCITALOPRAM OXALATE 10 MG: 10 TABLET ORAL at 08:06

## 2021-06-30 RX ADMIN — MAGNESIUM OXIDE TAB 400 MG (241.3 MG ELEMENTAL MG) 400 MG: 400 (241.3 MG) TAB at 09:06

## 2021-06-30 RX ADMIN — SODIUM BICARBONATE 650 MG TABLET 650 MG: at 09:06

## 2021-06-30 RX ADMIN — ATORVASTATIN CALCIUM 80 MG: 40 TABLET, FILM COATED ORAL at 09:06

## 2021-06-30 RX ADMIN — FUROSEMIDE 160 MG: 80 TABLET ORAL at 08:06

## 2021-06-30 RX ADMIN — SODIUM BICARBONATE 650 MG TABLET 650 MG: at 05:06

## 2021-06-30 RX ADMIN — MELATONIN TAB 3 MG 6 MG: 3 TAB at 09:06

## 2021-06-30 RX ADMIN — POTASSIUM CHLORIDE 40 MEQ: 1500 TABLET, EXTENDED RELEASE ORAL at 08:06

## 2021-06-30 RX ADMIN — LEVOFLOXACIN 750 MG: 750 TABLET, FILM COATED ORAL at 08:06

## 2021-06-30 RX ADMIN — METRONIDAZOLE 500 MG: 500 TABLET ORAL at 05:06

## 2021-06-30 RX ADMIN — MAGNESIUM OXIDE TAB 400 MG (241.3 MG ELEMENTAL MG) 400 MG: 400 (241.3 MG) TAB at 08:06

## 2021-06-30 RX ADMIN — METRONIDAZOLE 500 MG: 500 TABLET ORAL at 06:06

## 2021-06-30 RX ADMIN — MIRTAZAPINE 7.5 MG: 7.5 TABLET ORAL at 09:06

## 2021-06-30 RX ADMIN — ALLOPURINOL 100 MG: 100 TABLET ORAL at 08:06

## 2021-06-30 RX ADMIN — METRONIDAZOLE 500 MG: 500 TABLET ORAL at 09:06

## 2021-06-30 RX ADMIN — FUROSEMIDE 160 MG: 80 TABLET ORAL at 05:06

## 2021-07-01 VITALS
BODY MASS INDEX: 30.85 KG/M2 | DIASTOLIC BLOOD PRESSURE: 51 MMHG | HEIGHT: 65 IN | HEART RATE: 113 BPM | OXYGEN SATURATION: 97 % | TEMPERATURE: 98 F | WEIGHT: 185.19 LBS | SYSTOLIC BLOOD PRESSURE: 93 MMHG | RESPIRATION RATE: 19 BRPM

## 2021-07-01 LAB
POCT GLUCOSE: 158 MG/DL (ref 70–110)
POCT GLUCOSE: 192 MG/DL (ref 70–110)

## 2021-07-01 PROCEDURE — 99239 HOSP IP/OBS DSCHRG MGMT >30: CPT | Mod: ,,, | Performed by: INTERNAL MEDICINE

## 2021-07-01 PROCEDURE — 99239 PR HOSPITAL DISCHARGE DAY,>30 MIN: ICD-10-PCS | Mod: ,,, | Performed by: INTERNAL MEDICINE

## 2021-07-01 PROCEDURE — 25000003 PHARM REV CODE 250: Performed by: INTERNAL MEDICINE

## 2021-07-01 PROCEDURE — 25000003 PHARM REV CODE 250: Performed by: PHYSICIAN ASSISTANT

## 2021-07-01 PROCEDURE — 97530 THERAPEUTIC ACTIVITIES: CPT

## 2021-07-01 RX ORDER — LEVOFLOXACIN 750 MG/1
750 TABLET ORAL DAILY
Refills: 1
Start: 2021-07-01 | End: 2021-08-23

## 2021-07-01 RX ORDER — SODIUM BICARBONATE 650 MG/1
650 TABLET ORAL 2 TIMES DAILY
Qty: 60 TABLET | Refills: 11 | Status: ON HOLD
Start: 2021-07-01 | End: 2022-02-03 | Stop reason: SDUPTHER

## 2021-07-01 RX ORDER — FUROSEMIDE 80 MG/1
160 TABLET ORAL 2 TIMES DAILY
Qty: 120 TABLET | Refills: 11
Start: 2021-07-01 | End: 2021-08-23

## 2021-07-01 RX ORDER — SODIUM BICARBONATE 650 MG/1
650 TABLET ORAL 2 TIMES DAILY
Status: DISCONTINUED | OUTPATIENT
Start: 2021-07-01 | End: 2021-07-01 | Stop reason: HOSPADM

## 2021-07-01 RX ORDER — METRONIDAZOLE 500 MG/1
500 TABLET ORAL EVERY 8 HOURS
Qty: 21 TABLET | Refills: 0 | Status: SHIPPED | OUTPATIENT
Start: 2021-07-01 | End: 2021-07-08

## 2021-07-01 RX ADMIN — METRONIDAZOLE 500 MG: 500 TABLET ORAL at 05:07

## 2021-07-01 RX ADMIN — METRONIDAZOLE 500 MG: 500 TABLET ORAL at 02:07

## 2021-07-01 RX ADMIN — OXYCODONE HYDROCHLORIDE 10 MG: 10 TABLET ORAL at 05:07

## 2021-07-01 RX ADMIN — INSULIN DETEMIR 8 UNITS: 100 INJECTION, SOLUTION SUBCUTANEOUS at 09:07

## 2021-07-01 RX ADMIN — ALLOPURINOL 100 MG: 100 TABLET ORAL at 09:07

## 2021-07-01 RX ADMIN — FUROSEMIDE 160 MG: 80 TABLET ORAL at 09:07

## 2021-07-01 RX ADMIN — MAGNESIUM OXIDE TAB 400 MG (241.3 MG ELEMENTAL MG) 400 MG: 400 (241.3 MG) TAB at 09:07

## 2021-07-01 RX ADMIN — SODIUM BICARBONATE 650 MG TABLET 650 MG: at 09:07

## 2021-07-01 RX ADMIN — INSULIN ASPART 5 UNITS: 100 INJECTION, SOLUTION INTRAVENOUS; SUBCUTANEOUS at 09:07

## 2021-07-01 RX ADMIN — COLLAGENASE SANTYL: 250 OINTMENT TOPICAL at 09:07

## 2021-07-01 RX ADMIN — ESCITALOPRAM OXALATE 10 MG: 10 TABLET ORAL at 09:07

## 2021-07-01 RX ADMIN — LEVOFLOXACIN 750 MG: 750 TABLET, FILM COATED ORAL at 09:07

## 2021-07-01 RX ADMIN — INSULIN ASPART 5 UNITS: 100 INJECTION, SOLUTION INTRAVENOUS; SUBCUTANEOUS at 12:07

## 2021-07-04 ENCOUNTER — HOSPITAL ENCOUNTER (EMERGENCY)
Facility: HOSPITAL | Age: 56
Discharge: HOME | End: 2021-07-05
Attending: EMERGENCY MEDICINE
Payer: MEDICARE

## 2021-07-04 VITALS
DIASTOLIC BLOOD PRESSURE: 87 MMHG | SYSTOLIC BLOOD PRESSURE: 141 MMHG | TEMPERATURE: 98 F | RESPIRATION RATE: 18 BRPM | WEIGHT: 190 LBS | HEART RATE: 96 BPM | OXYGEN SATURATION: 97 % | BODY MASS INDEX: 31.65 KG/M2 | HEIGHT: 65 IN

## 2021-07-04 DIAGNOSIS — R06.02 SOB (SHORTNESS OF BREATH): Primary | ICD-10-CM

## 2021-07-04 LAB
ALBUMIN SERPL BCP-MCNC: 2 G/DL (ref 3.5–5.2)
ALP SERPL-CCNC: 137 U/L (ref 55–135)
ALT SERPL W/O P-5'-P-CCNC: 12 U/L (ref 10–44)
ANION GAP SERPL CALC-SCNC: 12 MMOL/L (ref 8–16)
AST SERPL-CCNC: 58 U/L (ref 10–40)
BASOPHILS # BLD AUTO: 0.03 K/UL (ref 0–0.2)
BASOPHILS NFR BLD: 0.3 % (ref 0–1.9)
BILIRUB SERPL-MCNC: 0.3 MG/DL (ref 0.1–1)
BNP SERPL-MCNC: 46 PG/ML (ref 0–99)
BUN SERPL-MCNC: 20 MG/DL (ref 6–20)
BUN SERPL-MCNC: 24 MG/DL (ref 6–30)
CALCIUM SERPL-MCNC: 9.2 MG/DL (ref 8.7–10.5)
CHLORIDE SERPL-SCNC: 102 MMOL/L (ref 95–110)
CHLORIDE SERPL-SCNC: 104 MMOL/L (ref 95–110)
CO2 SERPL-SCNC: 20 MMOL/L (ref 23–29)
CREAT SERPL-MCNC: 1.4 MG/DL (ref 0.5–1.4)
CREAT SERPL-MCNC: 1.5 MG/DL (ref 0.5–1.4)
D DIMER PPP IA.FEU-MCNC: 3.13 MG/L FEU
DIFFERENTIAL METHOD: ABNORMAL
EOSINOPHIL # BLD AUTO: 0.2 K/UL (ref 0–0.5)
EOSINOPHIL NFR BLD: 2.1 % (ref 0–8)
ERYTHROCYTE [DISTWIDTH] IN BLOOD BY AUTOMATED COUNT: 18.4 % (ref 11.5–14.5)
EST. GFR  (AFRICAN AMERICAN): 45.2 ML/MIN/1.73 M^2
EST. GFR  (NON AFRICAN AMERICAN): 39.2 ML/MIN/1.73 M^2
GLUCOSE SERPL-MCNC: 120 MG/DL (ref 70–110)
GLUCOSE SERPL-MCNC: 127 MG/DL (ref 70–110)
HCT VFR BLD AUTO: 30.1 % (ref 37–48.5)
HCT VFR BLD CALC: 28 %PCV (ref 36–54)
HGB BLD-MCNC: 9.3 G/DL (ref 12–16)
IMM GRANULOCYTES # BLD AUTO: 0.04 K/UL (ref 0–0.04)
IMM GRANULOCYTES NFR BLD AUTO: 0.4 % (ref 0–0.5)
LYMPHOCYTES # BLD AUTO: 1 K/UL (ref 1–4.8)
LYMPHOCYTES NFR BLD: 10.4 % (ref 18–48)
MAGNESIUM SERPL-MCNC: 2 MG/DL (ref 1.6–2.6)
MCH RBC QN AUTO: 30.2 PG (ref 27–31)
MCHC RBC AUTO-ENTMCNC: 30.9 G/DL (ref 32–36)
MCV RBC AUTO: 98 FL (ref 82–98)
MONOCYTES # BLD AUTO: 1 K/UL (ref 0.3–1)
MONOCYTES NFR BLD: 10.3 % (ref 4–15)
NEUTROPHILS # BLD AUTO: 7.3 K/UL (ref 1.8–7.7)
NEUTROPHILS NFR BLD: 76.5 % (ref 38–73)
NRBC BLD-RTO: 0 /100 WBC
PLATELET # BLD AUTO: 397 K/UL (ref 150–450)
PMV BLD AUTO: 9.4 FL (ref 9.2–12.9)
POC IONIZED CALCIUM: 1.08 MMOL/L (ref 1.06–1.42)
POC TCO2 (MEASURED): 26 MMOL/L (ref 23–29)
POTASSIUM BLD-SCNC: 4.5 MMOL/L (ref 3.5–5.1)
POTASSIUM SERPL-SCNC: 5.8 MMOL/L (ref 3.5–5.1)
PROT SERPL-MCNC: 7.5 G/DL (ref 6–8.4)
RBC # BLD AUTO: 3.08 M/UL (ref 4–5.4)
SAMPLE: ABNORMAL
SODIUM BLD-SCNC: 138 MMOL/L (ref 136–145)
SODIUM SERPL-SCNC: 136 MMOL/L (ref 136–145)
TROPONIN I SERPL DL<=0.01 NG/ML-MCNC: 0.01 NG/ML (ref 0–0.03)
TROPONIN I SERPL DL<=0.01 NG/ML-MCNC: <0.006 NG/ML (ref 0–0.03)
TSH SERPL DL<=0.005 MIU/L-ACNC: 2.99 UIU/ML (ref 0.4–4)
WBC # BLD AUTO: 9.53 K/UL (ref 3.9–12.7)

## 2021-07-04 PROCEDURE — 82330 ASSAY OF CALCIUM: CPT

## 2021-07-04 PROCEDURE — 84443 ASSAY THYROID STIM HORMONE: CPT | Performed by: PHYSICIAN ASSISTANT

## 2021-07-04 PROCEDURE — 80047 BASIC METABLC PNL IONIZED CA: CPT

## 2021-07-04 PROCEDURE — 99285 EMERGENCY DEPT VISIT HI MDM: CPT | Mod: 25

## 2021-07-04 PROCEDURE — 99285 EMERGENCY DEPT VISIT HI MDM: CPT | Mod: ,,, | Performed by: EMERGENCY MEDICINE

## 2021-07-04 PROCEDURE — 99285 PR EMERGENCY DEPT VISIT,LEVEL V: ICD-10-PCS | Mod: ,,, | Performed by: EMERGENCY MEDICINE

## 2021-07-04 PROCEDURE — 25000003 PHARM REV CODE 250: Performed by: PHYSICIAN ASSISTANT

## 2021-07-04 PROCEDURE — 80053 COMPREHEN METABOLIC PANEL: CPT | Performed by: PHYSICIAN ASSISTANT

## 2021-07-04 PROCEDURE — 85025 COMPLETE CBC W/AUTO DIFF WBC: CPT | Performed by: PHYSICIAN ASSISTANT

## 2021-07-04 PROCEDURE — 85379 FIBRIN DEGRADATION QUANT: CPT | Performed by: PHYSICIAN ASSISTANT

## 2021-07-04 PROCEDURE — 96360 HYDRATION IV INFUSION INIT: CPT | Mod: 59

## 2021-07-04 PROCEDURE — 93010 ELECTROCARDIOGRAM REPORT: CPT | Mod: ,,, | Performed by: INTERNAL MEDICINE

## 2021-07-04 PROCEDURE — 84484 ASSAY OF TROPONIN QUANT: CPT | Performed by: PHYSICIAN ASSISTANT

## 2021-07-04 PROCEDURE — 93005 ELECTROCARDIOGRAM TRACING: CPT

## 2021-07-04 PROCEDURE — 25500020 PHARM REV CODE 255: Performed by: EMERGENCY MEDICINE

## 2021-07-04 PROCEDURE — 83735 ASSAY OF MAGNESIUM: CPT | Performed by: PHYSICIAN ASSISTANT

## 2021-07-04 PROCEDURE — 25000003 PHARM REV CODE 250: Performed by: EMERGENCY MEDICINE

## 2021-07-04 PROCEDURE — 83880 ASSAY OF NATRIURETIC PEPTIDE: CPT | Performed by: PHYSICIAN ASSISTANT

## 2021-07-04 PROCEDURE — 93010 EKG 12-LEAD: ICD-10-PCS | Mod: ,,, | Performed by: INTERNAL MEDICINE

## 2021-07-04 PROCEDURE — 96361 HYDRATE IV INFUSION ADD-ON: CPT

## 2021-07-04 RX ORDER — OXYCODONE HYDROCHLORIDE 5 MG/1
5 TABLET ORAL
Status: COMPLETED | OUTPATIENT
Start: 2021-07-04 | End: 2021-07-04

## 2021-07-04 RX ADMIN — IOHEXOL 100 ML: 350 INJECTION, SOLUTION INTRAVENOUS at 06:07

## 2021-07-04 RX ADMIN — OXYCODONE 5 MG: 5 TABLET ORAL at 11:07

## 2021-07-04 RX ADMIN — SODIUM CHLORIDE 500 ML: 0.9 INJECTION, SOLUTION INTRAVENOUS at 03:07

## 2021-07-07 ENCOUNTER — PATIENT MESSAGE (OUTPATIENT)
Dept: ADMINISTRATIVE | Facility: HOSPITAL | Age: 56
End: 2021-07-07

## 2021-07-09 ENCOUNTER — TELEPHONE (OUTPATIENT)
Dept: VASCULAR SURGERY | Facility: CLINIC | Age: 56
End: 2021-07-09

## 2021-07-13 ENCOUNTER — TELEPHONE (OUTPATIENT)
Dept: VASCULAR SURGERY | Facility: CLINIC | Age: 56
End: 2021-07-13

## 2021-07-15 ENCOUNTER — TELEPHONE (OUTPATIENT)
Dept: VASCULAR SURGERY | Facility: CLINIC | Age: 56
End: 2021-07-15

## 2021-07-16 LAB
ACID FAST MOD KINY STN SPEC: NORMAL
MYCOBACTERIUM SPEC QL CULT: NORMAL

## 2021-07-19 ENCOUNTER — TELEPHONE (OUTPATIENT)
Dept: VASCULAR SURGERY | Facility: CLINIC | Age: 56
End: 2021-07-19

## 2021-07-19 ENCOUNTER — OFFICE VISIT (OUTPATIENT)
Dept: VASCULAR SURGERY | Facility: CLINIC | Age: 56
End: 2021-07-19
Payer: MEDICARE

## 2021-07-19 VITALS
HEIGHT: 65 IN | WEIGHT: 167 LBS | SYSTOLIC BLOOD PRESSURE: 140 MMHG | BODY MASS INDEX: 27.82 KG/M2 | DIASTOLIC BLOOD PRESSURE: 78 MMHG

## 2021-07-19 DIAGNOSIS — I70.229 CRITICAL LOWER LIMB ISCHEMIA: Primary | ICD-10-CM

## 2021-07-19 PROCEDURE — 99024 POSTOP FOLLOW-UP VISIT: CPT | Mod: POP,,, | Performed by: SURGERY

## 2021-07-19 PROCEDURE — 99024 PR POST-OP FOLLOW-UP VISIT: ICD-10-PCS | Mod: POP,,, | Performed by: SURGERY

## 2021-07-19 PROCEDURE — 99999 PR PBB SHADOW E&M-EST. PATIENT-LVL V: CPT | Mod: PBBFAC,,, | Performed by: SURGERY

## 2021-07-19 PROCEDURE — 99999 PR PBB SHADOW E&M-EST. PATIENT-LVL V: ICD-10-PCS | Mod: PBBFAC,,, | Performed by: SURGERY

## 2021-07-19 PROCEDURE — 99215 OFFICE O/P EST HI 40 MIN: CPT | Mod: PBBFAC | Performed by: SURGERY

## 2021-07-20 ENCOUNTER — TELEPHONE (OUTPATIENT)
Dept: PSYCHIATRY | Facility: CLINIC | Age: 56
End: 2021-07-20

## 2021-07-20 ENCOUNTER — DOCUMENTATION ONLY (OUTPATIENT)
Dept: VASCULAR SURGERY | Facility: CLINIC | Age: 56
End: 2021-07-20

## 2021-07-20 DIAGNOSIS — I73.9 PERIPHERAL ARTERY DISEASE: Primary | ICD-10-CM

## 2021-07-20 DIAGNOSIS — I70.229 CRITICAL LOWER LIMB ISCHEMIA: ICD-10-CM

## 2021-07-20 RX ORDER — OXYCODONE HYDROCHLORIDE 5 MG/1
5 TABLET ORAL EVERY 4 HOURS PRN
Qty: 28 TABLET | Refills: 0 | Status: SHIPPED | OUTPATIENT
Start: 2021-07-20 | End: 2021-07-21 | Stop reason: SDUPTHER

## 2021-07-21 ENCOUNTER — TELEPHONE (OUTPATIENT)
Dept: INTERNAL MEDICINE | Facility: CLINIC | Age: 56
End: 2021-07-21

## 2021-07-21 ENCOUNTER — TELEPHONE (OUTPATIENT)
Dept: VASCULAR SURGERY | Facility: CLINIC | Age: 56
End: 2021-07-21

## 2021-07-21 RX ORDER — OXYCODONE HYDROCHLORIDE 5 MG/1
5 TABLET ORAL EVERY 4 HOURS PRN
Qty: 28 TABLET | Refills: 0 | Status: SHIPPED | OUTPATIENT
Start: 2021-07-21 | End: 2021-08-11

## 2021-07-27 ENCOUNTER — PATIENT OUTREACH (OUTPATIENT)
Dept: ADMINISTRATIVE | Facility: OTHER | Age: 56
End: 2021-07-27

## 2021-07-29 ENCOUNTER — HOSPITAL ENCOUNTER (OUTPATIENT)
Dept: WOUND CARE | Facility: HOSPITAL | Age: 56
Discharge: HOME | End: 2021-07-29
Attending: SURGERY
Payer: MEDICARE

## 2021-07-29 ENCOUNTER — TELEPHONE (OUTPATIENT)
Dept: WOUND CARE | Facility: HOSPITAL | Age: 56
End: 2021-07-29

## 2021-07-29 VITALS
HEART RATE: 101 BPM | RESPIRATION RATE: 20 BRPM | TEMPERATURE: 98 F | DIASTOLIC BLOOD PRESSURE: 62 MMHG | SYSTOLIC BLOOD PRESSURE: 126 MMHG

## 2021-07-29 DIAGNOSIS — I73.9 PERIPHERAL VASCULAR DISEASE: ICD-10-CM

## 2021-07-29 DIAGNOSIS — L97.522 DIABETIC ULCER OF OTHER PART OF LEFT FOOT ASSOCIATED WITH TYPE 2 DIABETES MELLITUS, WITH FAT LAYER EXPOSED: Primary | ICD-10-CM

## 2021-07-29 DIAGNOSIS — E11.622 DIABETIC ULCER OF LEFT LOWER LEG WITH FAT LAYER EXPOSED: ICD-10-CM

## 2021-07-29 DIAGNOSIS — E11.621 DIABETIC ULCER OF OTHER PART OF LEFT FOOT ASSOCIATED WITH TYPE 2 DIABETES MELLITUS, WITH FAT LAYER EXPOSED: Primary | ICD-10-CM

## 2021-07-29 DIAGNOSIS — L97.922 DIABETIC ULCER OF LEFT LOWER LEG WITH FAT LAYER EXPOSED: ICD-10-CM

## 2021-07-29 DIAGNOSIS — L97.522 ULCER OF LEFT FOOT WITH FAT LAYER EXPOSED: ICD-10-CM

## 2021-07-29 DIAGNOSIS — I70.229 CRITICAL LOWER LIMB ISCHEMIA: ICD-10-CM

## 2021-07-29 PROCEDURE — 99214 PR OFFICE/OUTPT VISIT, EST, LEVL IV, 30-39 MIN: ICD-10-PCS | Mod: 25,,, | Performed by: FAMILY MEDICINE

## 2021-07-29 PROCEDURE — 99214 OFFICE O/P EST MOD 30 MIN: CPT | Mod: 25,,, | Performed by: FAMILY MEDICINE

## 2021-07-29 PROCEDURE — 11042 DBRDMT SUBQ TIS 1ST 20SQCM/<: CPT | Performed by: FAMILY MEDICINE

## 2021-07-29 PROCEDURE — 27201912 HC WOUND CARE DEBRIDEMENT SUPPLIES: Performed by: FAMILY MEDICINE

## 2021-08-03 ENCOUNTER — TELEPHONE (OUTPATIENT)
Dept: WOUND CARE | Facility: HOSPITAL | Age: 56
End: 2021-08-03

## 2021-08-05 ENCOUNTER — HOSPITAL ENCOUNTER (OUTPATIENT)
Dept: WOUND CARE | Facility: HOSPITAL | Age: 56
Discharge: HOME | End: 2021-08-05
Attending: FAMILY MEDICINE
Payer: MEDICARE

## 2021-08-05 VITALS
DIASTOLIC BLOOD PRESSURE: 74 MMHG | SYSTOLIC BLOOD PRESSURE: 158 MMHG | OXYGEN SATURATION: 16 % | HEART RATE: 99 BPM | TEMPERATURE: 98 F

## 2021-08-05 DIAGNOSIS — E11.621 DIABETIC ULCER OF LEFT FOOT ASSOCIATED WITH TYPE 2 DIABETES MELLITUS, WITH FAT LAYER EXPOSED: Primary | ICD-10-CM

## 2021-08-05 DIAGNOSIS — L97.522 DIABETIC ULCER OF OTHER PART OF LEFT FOOT ASSOCIATED WITH TYPE 2 DIABETES MELLITUS, WITH FAT LAYER EXPOSED: ICD-10-CM

## 2021-08-05 DIAGNOSIS — L97.922 DIABETIC ULCER OF LEFT LOWER LEG WITH FAT LAYER EXPOSED: ICD-10-CM

## 2021-08-05 DIAGNOSIS — R23.4 ESCHAR OF HEEL: ICD-10-CM

## 2021-08-05 DIAGNOSIS — E11.621 DIABETIC ULCER OF OTHER PART OF LEFT FOOT ASSOCIATED WITH TYPE 2 DIABETES MELLITUS, WITH FAT LAYER EXPOSED: ICD-10-CM

## 2021-08-05 DIAGNOSIS — E11.622 DIABETIC ULCER OF LEFT LOWER LEG WITH FAT LAYER EXPOSED: ICD-10-CM

## 2021-08-05 DIAGNOSIS — L97.522 DIABETIC ULCER OF LEFT FOOT ASSOCIATED WITH TYPE 2 DIABETES MELLITUS, WITH FAT LAYER EXPOSED: Primary | ICD-10-CM

## 2021-08-05 PROCEDURE — 99214 PR OFFICE/OUTPT VISIT, EST, LEVL IV, 30-39 MIN: ICD-10-PCS | Mod: 25,,, | Performed by: FAMILY MEDICINE

## 2021-08-05 PROCEDURE — 27201912 HC WOUND CARE DEBRIDEMENT SUPPLIES: Performed by: FAMILY MEDICINE

## 2021-08-05 PROCEDURE — 11042 DEBRIDEMENT: ICD-10-PCS | Mod: ,,, | Performed by: FAMILY MEDICINE

## 2021-08-05 PROCEDURE — 11042 DBRDMT SUBQ TIS 1ST 20SQCM/<: CPT | Performed by: FAMILY MEDICINE

## 2021-08-05 PROCEDURE — 11042 DBRDMT SUBQ TIS 1ST 20SQCM/<: CPT | Mod: ,,, | Performed by: FAMILY MEDICINE

## 2021-08-05 PROCEDURE — 99214 OFFICE O/P EST MOD 30 MIN: CPT | Mod: 25,,, | Performed by: FAMILY MEDICINE

## 2021-08-06 ENCOUNTER — HOSPITAL ENCOUNTER (OUTPATIENT)
Dept: CARDIOLOGY | Facility: HOSPITAL | Age: 56
Discharge: HOME | End: 2021-08-06
Attending: SURGERY
Payer: MEDICARE

## 2021-08-06 DIAGNOSIS — I70.229 CRITICAL LOWER LIMB ISCHEMIA: ICD-10-CM

## 2021-08-06 LAB
LEFT ARM BP: 172 MMHG
RIGHT ABI: 1.43
RIGHT ARM BP: 178 MMHG
RIGHT DORSALIS PEDIS: 255 MMHG
RIGHT POSTERIOR TIBIAL: 255 MMHG
RIGHT TBI: 0.19
RIGHT TOE PRESSURE: 34 MMHG

## 2021-08-06 PROCEDURE — 93922 ANKLE BRACHIAL INDICES (ABI): ICD-10-PCS | Mod: 26,,, | Performed by: INTERNAL MEDICINE

## 2021-08-06 PROCEDURE — 93922 UPR/L XTREMITY ART 2 LEVELS: CPT | Mod: 26,,, | Performed by: INTERNAL MEDICINE

## 2021-08-06 PROCEDURE — 93922 UPR/L XTREMITY ART 2 LEVELS: CPT

## 2021-08-10 ENCOUNTER — TELEPHONE (OUTPATIENT)
Dept: PAIN MEDICINE | Facility: CLINIC | Age: 56
End: 2021-08-10

## 2021-08-11 ENCOUNTER — OFFICE VISIT (OUTPATIENT)
Dept: PAIN MEDICINE | Facility: CLINIC | Age: 56
End: 2021-08-11
Payer: MEDICARE

## 2021-08-11 VITALS
HEIGHT: 65 IN | HEART RATE: 103 BPM | DIASTOLIC BLOOD PRESSURE: 62 MMHG | SYSTOLIC BLOOD PRESSURE: 111 MMHG | BODY MASS INDEX: 27.79 KG/M2 | TEMPERATURE: 98 F | OXYGEN SATURATION: 98 %

## 2021-08-11 DIAGNOSIS — Z74.09 IMPAIRED FUNCTIONAL MOBILITY AND ENDURANCE: Primary | ICD-10-CM

## 2021-08-11 DIAGNOSIS — M62.551 MUSCLE WASTING AND ATROPHY, NOT ELSEWHERE CLASSIFIED, RIGHT THIGH: ICD-10-CM

## 2021-08-11 PROCEDURE — 99204 PR OFFICE/OUTPT VISIT, NEW, LEVL IV, 45-59 MIN: ICD-10-PCS | Mod: S$PBB,,, | Performed by: PAIN MEDICINE

## 2021-08-11 PROCEDURE — 99999 PR PBB SHADOW E&M-EST. PATIENT-LVL V: CPT | Mod: PBBFAC,,, | Performed by: PAIN MEDICINE

## 2021-08-11 PROCEDURE — 99204 OFFICE O/P NEW MOD 45 MIN: CPT | Mod: S$PBB,,, | Performed by: PAIN MEDICINE

## 2021-08-11 PROCEDURE — 99215 OFFICE O/P EST HI 40 MIN: CPT | Mod: PBBFAC,PN | Performed by: PAIN MEDICINE

## 2021-08-11 PROCEDURE — 99999 PR PBB SHADOW E&M-EST. PATIENT-LVL V: ICD-10-PCS | Mod: PBBFAC,,, | Performed by: PAIN MEDICINE

## 2021-08-11 RX ORDER — LOSARTAN POTASSIUM 50 MG/1
50 TABLET ORAL
COMMUNITY
Start: 2021-07-15 | End: 2021-09-13

## 2021-08-11 RX ORDER — NYSTATIN 100000 [USP'U]/G
POWDER TOPICAL
COMMUNITY
Start: 2021-07-15 | End: 2021-08-11 | Stop reason: SDUPTHER

## 2021-08-11 RX ORDER — MULTIVITAMIN
1 TABLET ORAL
COMMUNITY
Start: 2021-07-15 | End: 2021-09-13

## 2021-08-11 RX ORDER — TORSEMIDE 20 MG/1
20 TABLET ORAL 2 TIMES DAILY
COMMUNITY
Start: 2021-06-24 | End: 2021-08-23 | Stop reason: SDUPTHER

## 2021-08-11 RX ORDER — ACETAMINOPHEN 500 MG
500 TABLET ORAL EVERY 6 HOURS PRN
COMMUNITY
Start: 2021-07-15 | End: 2021-08-11 | Stop reason: SDUPTHER

## 2021-08-11 RX ORDER — FUROSEMIDE 40 MG/1
40 TABLET ORAL
COMMUNITY
Start: 2021-07-15 | End: 2021-08-23 | Stop reason: SDUPTHER

## 2021-08-11 RX ORDER — INSULIN GLARGINE 100 [IU]/ML
13 INJECTION, SOLUTION SUBCUTANEOUS
COMMUNITY
Start: 2021-07-15 | End: 2021-09-13

## 2021-08-11 RX ORDER — ASCORBIC ACID 250 MG
250 TABLET ORAL
COMMUNITY
Start: 2021-07-15 | End: 2021-09-13

## 2021-08-11 RX ORDER — SODIUM BICARBONATE 650 MG/1
650 TABLET ORAL
COMMUNITY
Start: 2021-07-15 | End: 2021-08-11 | Stop reason: SDUPTHER

## 2021-08-11 RX ORDER — ASPIRIN 81 MG/1
81 TABLET ORAL
COMMUNITY
Start: 2021-07-15 | End: 2021-09-13

## 2021-08-11 RX ORDER — INSULIN LISPRO 100 [IU]/ML
5 INJECTION, SOLUTION INTRAVENOUS; SUBCUTANEOUS
COMMUNITY
Start: 2021-07-15 | End: 2021-08-14

## 2021-08-11 RX ORDER — PREGABALIN 50 MG/1
50 CAPSULE ORAL
COMMUNITY
Start: 2021-07-15 | End: 2021-08-14

## 2021-08-11 RX ORDER — DOCUSATE SODIUM 100 MG/1
100 CAPSULE, LIQUID FILLED ORAL 2 TIMES DAILY PRN
COMMUNITY
Start: 2021-07-15 | End: 2021-09-13

## 2021-08-11 RX ORDER — ACETAMINOPHEN 500 MG
5 TABLET ORAL
COMMUNITY
Start: 2021-07-15 | End: 2021-08-23

## 2021-08-12 ENCOUNTER — HOSPITAL ENCOUNTER (OUTPATIENT)
Dept: WOUND CARE | Facility: HOSPITAL | Age: 56
Discharge: HOME | End: 2021-08-12
Attending: FAMILY MEDICINE
Payer: MEDICARE

## 2021-08-12 VITALS
HEART RATE: 99 BPM | DIASTOLIC BLOOD PRESSURE: 79 MMHG | RESPIRATION RATE: 20 BRPM | SYSTOLIC BLOOD PRESSURE: 173 MMHG | TEMPERATURE: 98 F

## 2021-08-12 DIAGNOSIS — L97.519 ULCER OF RIGHT FOOT DUE TO TYPE 2 DIABETES MELLITUS: Primary | ICD-10-CM

## 2021-08-12 DIAGNOSIS — E11.622 DIABETIC ULCER OF LEFT LOWER LEG WITH FAT LAYER EXPOSED: ICD-10-CM

## 2021-08-12 DIAGNOSIS — R23.4 ESCHAR OF HEEL: ICD-10-CM

## 2021-08-12 DIAGNOSIS — L97.922 DIABETIC ULCER OF LEFT LOWER LEG WITH FAT LAYER EXPOSED: ICD-10-CM

## 2021-08-12 DIAGNOSIS — S91.106A: ICD-10-CM

## 2021-08-12 DIAGNOSIS — E11.621 ULCER OF RIGHT FOOT DUE TO TYPE 2 DIABETES MELLITUS: Primary | ICD-10-CM

## 2021-08-12 PROCEDURE — 11042 DBRDMT SUBQ TIS 1ST 20SQCM/<: CPT

## 2021-08-12 PROCEDURE — 99214 PR OFFICE/OUTPT VISIT, EST, LEVL IV, 30-39 MIN: ICD-10-PCS | Mod: 25,,, | Performed by: FAMILY MEDICINE

## 2021-08-12 PROCEDURE — 99214 OFFICE O/P EST MOD 30 MIN: CPT | Mod: 25,,, | Performed by: FAMILY MEDICINE

## 2021-08-12 PROCEDURE — 11042 DBRDMT SUBQ TIS 1ST 20SQCM/<: CPT | Mod: ,,, | Performed by: FAMILY MEDICINE

## 2021-08-12 PROCEDURE — 11042 DEBRIDEMENT: ICD-10-PCS | Mod: ,,, | Performed by: FAMILY MEDICINE

## 2021-08-13 ENCOUNTER — DOCUMENT SCAN (OUTPATIENT)
Dept: HOME HEALTH SERVICES | Facility: HOSPITAL | Age: 56
End: 2021-08-13
Payer: MEDICARE

## 2021-08-16 ENCOUNTER — TELEPHONE (OUTPATIENT)
Dept: FAMILY MEDICINE | Facility: CLINIC | Age: 56
End: 2021-08-16

## 2021-08-16 ENCOUNTER — TELEPHONE (OUTPATIENT)
Dept: INTERNAL MEDICINE | Facility: CLINIC | Age: 56
End: 2021-08-16

## 2021-08-17 ENCOUNTER — TELEPHONE (OUTPATIENT)
Dept: WOUND CARE | Facility: HOSPITAL | Age: 56
End: 2021-08-17

## 2021-08-17 ENCOUNTER — TELEPHONE (OUTPATIENT)
Dept: VASCULAR SURGERY | Facility: CLINIC | Age: 56
End: 2021-08-17

## 2021-08-18 ENCOUNTER — TELEPHONE (OUTPATIENT)
Dept: FAMILY MEDICINE | Facility: CLINIC | Age: 56
End: 2021-08-18

## 2021-08-19 ENCOUNTER — TELEPHONE (OUTPATIENT)
Dept: INTERNAL MEDICINE | Facility: CLINIC | Age: 56
End: 2021-08-19

## 2021-08-19 ENCOUNTER — DOCUMENT SCAN (OUTPATIENT)
Dept: HOME HEALTH SERVICES | Facility: HOSPITAL | Age: 56
End: 2021-08-19
Payer: MEDICARE

## 2021-08-19 ENCOUNTER — HOSPITAL ENCOUNTER (OUTPATIENT)
Dept: WOUND CARE | Facility: HOSPITAL | Age: 56
Discharge: HOME | End: 2021-08-19
Attending: FAMILY MEDICINE
Payer: MEDICARE

## 2021-08-19 VITALS — TEMPERATURE: 98 F | SYSTOLIC BLOOD PRESSURE: 131 MMHG | HEART RATE: 99 BPM | DIASTOLIC BLOOD PRESSURE: 86 MMHG

## 2021-08-19 DIAGNOSIS — I73.9 PERIPHERAL VASCULAR DISEASE: Primary | ICD-10-CM

## 2021-08-19 DIAGNOSIS — L97.922 DIABETIC ULCER OF LEFT LOWER LEG WITH FAT LAYER EXPOSED: Primary | ICD-10-CM

## 2021-08-19 DIAGNOSIS — I73.9 PAD (PERIPHERAL ARTERY DISEASE): ICD-10-CM

## 2021-08-19 DIAGNOSIS — I73.9 PERIPHERAL VASCULAR DISEASE: ICD-10-CM

## 2021-08-19 DIAGNOSIS — E11.622 DIABETIC ULCER OF LEFT LOWER LEG WITH FAT LAYER EXPOSED: Primary | ICD-10-CM

## 2021-08-19 DIAGNOSIS — Z95.828 S/P FEMORAL-POPLITEAL BYPASS SURGERY: ICD-10-CM

## 2021-08-19 PROCEDURE — 11042 DBRDMT SUBQ TIS 1ST 20SQCM/<: CPT | Performed by: FAMILY MEDICINE

## 2021-08-19 PROCEDURE — 27201912 HC WOUND CARE DEBRIDEMENT SUPPLIES: Performed by: FAMILY MEDICINE

## 2021-08-19 PROCEDURE — 99214 PR OFFICE/OUTPT VISIT, EST, LEVL IV, 30-39 MIN: ICD-10-PCS | Mod: 25,,, | Performed by: FAMILY MEDICINE

## 2021-08-19 PROCEDURE — 99214 OFFICE O/P EST MOD 30 MIN: CPT | Mod: 25,,, | Performed by: FAMILY MEDICINE

## 2021-08-20 ENCOUNTER — TELEPHONE (OUTPATIENT)
Dept: INTERNAL MEDICINE | Facility: CLINIC | Age: 56
End: 2021-08-20

## 2021-08-20 DIAGNOSIS — Z89.612 STATUS POST ABOVE KNEE AMPUTATION, LEFT: ICD-10-CM

## 2021-08-20 DIAGNOSIS — I73.9 PERIPHERAL VASCULAR DISEASE: Primary | ICD-10-CM

## 2021-08-20 DIAGNOSIS — I50.42 CHRONIC COMBINED SYSTOLIC AND DIASTOLIC HEART FAILURE: ICD-10-CM

## 2021-08-20 PROBLEM — N18.30 STAGE 3 CHRONIC KIDNEY DISEASE DUE TO TYPE 2 DIABETES MELLITUS: Status: ACTIVE | Noted: 2021-07-01

## 2021-08-20 PROBLEM — I50.32 CHRONIC DIASTOLIC HEART FAILURE: Status: ACTIVE | Noted: 2019-12-08

## 2021-08-20 PROBLEM — S78.119A AMPUTATION ABOVE KNEE: Status: ACTIVE | Noted: 2021-06-08

## 2021-08-20 PROBLEM — F43.23 ADJUSTMENT DISORDER WITH MIXED ANXIETY AND DEPRESSED MOOD: Status: ACTIVE | Noted: 2021-06-16

## 2021-08-20 PROBLEM — G54.6 PHANTOM LIMB SYNDROME WITH PAIN: Status: ACTIVE | Noted: 2021-07-12

## 2021-08-20 PROBLEM — E11.22 STAGE 3 CHRONIC KIDNEY DISEASE DUE TO TYPE 2 DIABETES MELLITUS: Status: ACTIVE | Noted: 2021-07-01

## 2021-08-20 PROBLEM — D64.9 CHRONIC ANEMIA: Status: ACTIVE | Noted: 2021-03-23

## 2021-08-20 PROBLEM — M86.179 ACUTE OSTEOMYELITIS OF CALCANEUM: Status: ACTIVE | Noted: 2020-07-18

## 2021-08-23 ENCOUNTER — OFFICE VISIT (OUTPATIENT)
Dept: CARDIOLOGY | Facility: CLINIC | Age: 56
End: 2021-08-23
Payer: MEDICARE

## 2021-08-23 ENCOUNTER — TELEPHONE (OUTPATIENT)
Dept: CARDIOLOGY | Facility: CLINIC | Age: 56
End: 2021-08-23

## 2021-08-23 ENCOUNTER — TELEPHONE (OUTPATIENT)
Dept: WOUND CARE | Facility: HOSPITAL | Age: 56
End: 2021-08-23

## 2021-08-23 VITALS
WEIGHT: 167 LBS | HEART RATE: 82 BPM | HEIGHT: 65 IN | BODY MASS INDEX: 27.82 KG/M2 | SYSTOLIC BLOOD PRESSURE: 139 MMHG | DIASTOLIC BLOOD PRESSURE: 78 MMHG

## 2021-08-23 DIAGNOSIS — I70.229 CRITICAL LOWER LIMB ISCHEMIA: ICD-10-CM

## 2021-08-23 DIAGNOSIS — I50.32 CHRONIC DIASTOLIC HEART FAILURE: ICD-10-CM

## 2021-08-23 DIAGNOSIS — Z95.828 S/P FEMORAL-POPLITEAL BYPASS SURGERY: ICD-10-CM

## 2021-08-23 DIAGNOSIS — I73.9 PAD (PERIPHERAL ARTERY DISEASE): Primary | ICD-10-CM

## 2021-08-23 DIAGNOSIS — N18.30 STAGE 3 CHRONIC KIDNEY DISEASE, UNSPECIFIED WHETHER STAGE 3A OR 3B CKD: Chronic | ICD-10-CM

## 2021-08-23 DIAGNOSIS — I48.0 PAROXYSMAL ATRIAL FIBRILLATION: Chronic | ICD-10-CM

## 2021-08-23 DIAGNOSIS — I73.9 PERIPHERAL VASCULAR DISEASE, UNSPECIFIED: Primary | ICD-10-CM

## 2021-08-23 DIAGNOSIS — I10 ESSENTIAL HYPERTENSION: Chronic | ICD-10-CM

## 2021-08-23 DIAGNOSIS — Z95.1 S/P CABG X 1: ICD-10-CM

## 2021-08-23 DIAGNOSIS — E78.2 MIXED HYPERLIPIDEMIA: Chronic | ICD-10-CM

## 2021-08-23 DIAGNOSIS — T82.868D THROMBOSIS OF FEMORO-POPLITEAL BYPASS GRAFT, SUBSEQUENT ENCOUNTER: ICD-10-CM

## 2021-08-23 PROCEDURE — 99215 OFFICE O/P EST HI 40 MIN: CPT | Mod: S$PBB,,, | Performed by: INTERNAL MEDICINE

## 2021-08-23 PROCEDURE — 99999 PR PBB SHADOW E&M-EST. PATIENT-LVL V: CPT | Mod: PBBFAC,,, | Performed by: INTERNAL MEDICINE

## 2021-08-23 PROCEDURE — 99999 PR PBB SHADOW E&M-EST. PATIENT-LVL V: ICD-10-PCS | Mod: PBBFAC,,, | Performed by: INTERNAL MEDICINE

## 2021-08-23 PROCEDURE — 99215 PR OFFICE/OUTPT VISIT, EST, LEVL V, 40-54 MIN: ICD-10-PCS | Mod: S$PBB,,, | Performed by: INTERNAL MEDICINE

## 2021-08-23 PROCEDURE — 99215 OFFICE O/P EST HI 40 MIN: CPT | Mod: PBBFAC,PO | Performed by: INTERNAL MEDICINE

## 2021-08-23 RX ORDER — FUROSEMIDE 40 MG/1
40 TABLET ORAL 2 TIMES DAILY
Qty: 180 TABLET | Refills: 3
Start: 2021-08-23 | End: 2021-12-21 | Stop reason: SDUPTHER

## 2021-08-24 ENCOUNTER — HOSPITAL ENCOUNTER (OUTPATIENT)
Dept: RADIOLOGY | Facility: HOSPITAL | Age: 56
Discharge: HOME | End: 2021-08-24
Attending: INTERNAL MEDICINE
Payer: MEDICARE

## 2021-08-24 DIAGNOSIS — I73.9 PAD (PERIPHERAL ARTERY DISEASE): ICD-10-CM

## 2021-08-24 PROCEDURE — 93926 LOWER EXTREMITY STUDY: CPT | Mod: 26,RT,, | Performed by: RADIOLOGY

## 2021-08-24 PROCEDURE — 93926 US LOWER EXTREMITY ARTERIES RIGHT: ICD-10-PCS | Mod: 26,RT,, | Performed by: RADIOLOGY

## 2021-08-24 PROCEDURE — 93926 LOWER EXTREMITY STUDY: CPT | Mod: TC,RT

## 2021-08-24 RX ORDER — DIPHENHYDRAMINE HCL 25 MG
50 CAPSULE ORAL ONCE
Status: CANCELLED | OUTPATIENT
Start: 2021-08-24 | End: 2021-08-24

## 2021-08-24 RX ORDER — SODIUM CHLORIDE 9 MG/ML
INJECTION, SOLUTION INTRAVENOUS CONTINUOUS
Status: CANCELLED | OUTPATIENT
Start: 2021-08-24 | End: 2021-08-24

## 2021-08-25 ENCOUNTER — CARE AT HOME (OUTPATIENT)
Dept: HOME HEALTH SERVICES | Facility: CLINIC | Age: 56
End: 2021-08-25
Payer: MEDICARE

## 2021-08-25 DIAGNOSIS — Z79.4 TYPE 2 DIABETES MELLITUS WITH DIABETIC NEPHROPATHY, WITH LONG-TERM CURRENT USE OF INSULIN: ICD-10-CM

## 2021-08-25 DIAGNOSIS — E11.21 TYPE 2 DIABETES MELLITUS WITH DIABETIC NEPHROPATHY, WITH LONG-TERM CURRENT USE OF INSULIN: ICD-10-CM

## 2021-08-25 DIAGNOSIS — Z74.09 IMPAIRED FUNCTIONAL MOBILITY AND ENDURANCE: ICD-10-CM

## 2021-08-25 DIAGNOSIS — Z89.612 STATUS POST ABOVE KNEE AMPUTATION, LEFT: ICD-10-CM

## 2021-08-25 DIAGNOSIS — I73.9 PERIPHERAL VASCULAR DISEASE: ICD-10-CM

## 2021-08-25 DIAGNOSIS — I48.0 PAROXYSMAL ATRIAL FIBRILLATION: Primary | ICD-10-CM

## 2021-08-25 DIAGNOSIS — T87.89: ICD-10-CM

## 2021-08-25 DIAGNOSIS — L97.929: ICD-10-CM

## 2021-08-25 DIAGNOSIS — L97.419 HEEL ULCERATION, RIGHT, WITH UNSPECIFIED SEVERITY: ICD-10-CM

## 2021-08-25 PROCEDURE — 99350 PR HOME VISIT,ESTAB PATIENT,LEVEL IV: ICD-10-PCS | Mod: S$GLB,,, | Performed by: NURSE PRACTITIONER

## 2021-08-25 PROCEDURE — 99350 HOME/RES VST EST HIGH MDM 60: CPT | Mod: S$GLB,,, | Performed by: NURSE PRACTITIONER

## 2021-08-26 ENCOUNTER — TELEPHONE (OUTPATIENT)
Dept: WOUND CARE | Facility: HOSPITAL | Age: 56
End: 2021-08-26

## 2021-08-26 VITALS
SYSTOLIC BLOOD PRESSURE: 128 MMHG | HEART RATE: 70 BPM | DIASTOLIC BLOOD PRESSURE: 62 MMHG | OXYGEN SATURATION: 97 % | TEMPERATURE: 97 F | RESPIRATION RATE: 18 BRPM

## 2021-09-01 ENCOUNTER — TELEPHONE (OUTPATIENT)
Dept: WOUND CARE | Facility: HOSPITAL | Age: 56
End: 2021-09-01

## 2021-09-07 ENCOUNTER — TELEPHONE (OUTPATIENT)
Dept: PAIN MEDICINE | Facility: CLINIC | Age: 56
End: 2021-09-07

## 2021-09-10 ENCOUNTER — TELEPHONE (OUTPATIENT)
Dept: WOUND CARE | Facility: HOSPITAL | Age: 56
End: 2021-09-10

## 2021-09-15 ENCOUNTER — PATIENT OUTREACH (OUTPATIENT)
Dept: ADMINISTRATIVE | Facility: OTHER | Age: 56
End: 2021-09-15

## 2021-09-22 ENCOUNTER — OFFICE VISIT (OUTPATIENT)
Dept: PAIN MEDICINE | Facility: CLINIC | Age: 56
End: 2021-09-22
Payer: MEDICARE

## 2021-09-22 VITALS
HEART RATE: 88 BPM | SYSTOLIC BLOOD PRESSURE: 166 MMHG | OXYGEN SATURATION: 99 % | TEMPERATURE: 98 F | HEIGHT: 65 IN | DIASTOLIC BLOOD PRESSURE: 72 MMHG | BODY MASS INDEX: 27.79 KG/M2

## 2021-09-22 DIAGNOSIS — Z74.09 IMPAIRED FUNCTIONAL MOBILITY AND ENDURANCE: ICD-10-CM

## 2021-09-22 DIAGNOSIS — S78.119A AMPUTATION ABOVE KNEE: Primary | ICD-10-CM

## 2021-09-22 DIAGNOSIS — M79.2 PERIPHERAL NEUROPATHIC PAIN: ICD-10-CM

## 2021-09-22 DIAGNOSIS — I73.9 PERIPHERAL VASCULAR DISEASE: ICD-10-CM

## 2021-09-22 PROCEDURE — 99999 PR PBB SHADOW E&M-EST. PATIENT-LVL IV: CPT | Mod: PBBFAC,,, | Performed by: REGISTERED NURSE

## 2021-09-22 PROCEDURE — 99214 PR OFFICE/OUTPT VISIT, EST, LEVL IV, 30-39 MIN: ICD-10-PCS | Mod: S$PBB,,, | Performed by: REGISTERED NURSE

## 2021-09-22 PROCEDURE — 99999 PR PBB SHADOW E&M-EST. PATIENT-LVL IV: ICD-10-PCS | Mod: PBBFAC,,, | Performed by: REGISTERED NURSE

## 2021-09-22 PROCEDURE — 99214 OFFICE O/P EST MOD 30 MIN: CPT | Mod: PBBFAC,PN | Performed by: REGISTERED NURSE

## 2021-09-22 PROCEDURE — 99214 OFFICE O/P EST MOD 30 MIN: CPT | Mod: S$PBB,,, | Performed by: REGISTERED NURSE

## 2021-10-01 ENCOUNTER — HOSPITAL ENCOUNTER (OUTPATIENT)
Dept: WOUND CARE | Facility: HOSPITAL | Age: 56
Discharge: HOME | End: 2021-10-01
Attending: INTERNAL MEDICINE
Payer: MEDICARE

## 2021-10-04 ENCOUNTER — PATIENT MESSAGE (OUTPATIENT)
Dept: ADMINISTRATIVE | Facility: HOSPITAL | Age: 56
End: 2021-10-04

## 2021-10-08 ENCOUNTER — HOSPITAL ENCOUNTER (OUTPATIENT)
Dept: WOUND CARE | Facility: HOSPITAL | Age: 56
Discharge: HOME | End: 2021-10-08
Attending: INTERNAL MEDICINE
Payer: MEDICARE

## 2021-10-08 DIAGNOSIS — L97.529 ULCER OF LEFT FOOT: ICD-10-CM

## 2021-10-08 DIAGNOSIS — I73.9 PAD (PERIPHERAL ARTERY DISEASE): ICD-10-CM

## 2021-10-08 DIAGNOSIS — E11.622 DIABETIC ULCER OF LEFT LOWER LEG WITH FAT LAYER EXPOSED: ICD-10-CM

## 2021-10-08 DIAGNOSIS — E11.621 DIABETIC ULCER OF LEFT FOOT ASSOCIATED WITH TYPE 2 DIABETES MELLITUS, WITH FAT LAYER EXPOSED: Primary | ICD-10-CM

## 2021-10-08 DIAGNOSIS — L97.522 DIABETIC ULCER OF LEFT FOOT ASSOCIATED WITH TYPE 2 DIABETES MELLITUS, WITH FAT LAYER EXPOSED: Primary | ICD-10-CM

## 2021-10-08 DIAGNOSIS — L97.522 TOE ULCER, LEFT, WITH FAT LAYER EXPOSED: ICD-10-CM

## 2021-10-08 DIAGNOSIS — L97.922 DIABETIC ULCER OF LEFT LOWER LEG WITH FAT LAYER EXPOSED: ICD-10-CM

## 2021-10-08 PROCEDURE — 99215 PR OFFICE/OUTPT VISIT, EST, LEVL V, 40-54 MIN: ICD-10-PCS | Mod: ,,, | Performed by: PODIATRIST

## 2021-10-08 PROCEDURE — 99215 OFFICE O/P EST HI 40 MIN: CPT | Performed by: PODIATRIST

## 2021-10-08 PROCEDURE — 99215 OFFICE O/P EST HI 40 MIN: CPT | Mod: ,,, | Performed by: PODIATRIST

## 2021-10-14 ENCOUNTER — OFFICE VISIT (OUTPATIENT)
Dept: INFECTIOUS DISEASES | Facility: CLINIC | Age: 56
End: 2021-10-14
Payer: MEDICARE

## 2021-10-14 ENCOUNTER — TELEPHONE (OUTPATIENT)
Dept: NEUROLOGY | Facility: CLINIC | Age: 56
End: 2021-10-14

## 2021-10-14 ENCOUNTER — LAB VISIT (OUTPATIENT)
Dept: LAB | Facility: HOSPITAL | Age: 56
End: 2021-10-14
Payer: MEDICAID

## 2021-10-14 VITALS
HEIGHT: 65 IN | TEMPERATURE: 99 F | WEIGHT: 167 LBS | SYSTOLIC BLOOD PRESSURE: 112 MMHG | DIASTOLIC BLOOD PRESSURE: 78 MMHG | BODY MASS INDEX: 27.82 KG/M2 | HEART RATE: 88 BPM

## 2021-10-14 DIAGNOSIS — G25.9 ABNORMAL LEG MOVEMENT: ICD-10-CM

## 2021-10-14 DIAGNOSIS — S91.301A NON-HEALING WOUND OF RIGHT HEEL: Primary | ICD-10-CM

## 2021-10-14 DIAGNOSIS — M62.541 ATROPHY OF MUSCLE OF RIGHT HAND: ICD-10-CM

## 2021-10-14 DIAGNOSIS — S91.301A NON-HEALING WOUND OF RIGHT HEEL: ICD-10-CM

## 2021-10-14 LAB
ALBUMIN SERPL BCP-MCNC: 2.4 G/DL (ref 3.5–5.2)
ALP SERPL-CCNC: 122 U/L (ref 55–135)
ALT SERPL W/O P-5'-P-CCNC: 6 U/L (ref 10–44)
ANION GAP SERPL CALC-SCNC: 14 MMOL/L (ref 8–16)
AST SERPL-CCNC: 7 U/L (ref 10–40)
BASOPHILS # BLD AUTO: 0.02 K/UL (ref 0–0.2)
BASOPHILS NFR BLD: 0.4 % (ref 0–1.9)
BILIRUB SERPL-MCNC: 0.3 MG/DL (ref 0.1–1)
BUN SERPL-MCNC: 24 MG/DL (ref 6–20)
CALCIUM SERPL-MCNC: 9.5 MG/DL (ref 8.7–10.5)
CHLORIDE SERPL-SCNC: 100 MMOL/L (ref 95–110)
CO2 SERPL-SCNC: 21 MMOL/L (ref 23–29)
CREAT SERPL-MCNC: 1.5 MG/DL (ref 0.5–1.4)
CRP SERPL-MCNC: 14.8 MG/L (ref 0–8.2)
DIFFERENTIAL METHOD: NORMAL
EOSINOPHIL # BLD AUTO: 0.1 K/UL (ref 0–0.5)
EOSINOPHIL NFR BLD: 2.4 % (ref 0–8)
ERYTHROCYTE [DISTWIDTH] IN BLOOD BY AUTOMATED COUNT: 13.2 % (ref 11.5–14.5)
ERYTHROCYTE [SEDIMENTATION RATE] IN BLOOD BY WESTERGREN METHOD: 89 MM/HR (ref 0–36)
EST. GFR  (AFRICAN AMERICAN): 44.9 ML/MIN/1.73 M^2
EST. GFR  (NON AFRICAN AMERICAN): 38.9 ML/MIN/1.73 M^2
GLUCOSE SERPL-MCNC: 474 MG/DL (ref 70–110)
GRAM STN SPEC: NORMAL
GRAM STN SPEC: NORMAL
HCT VFR BLD AUTO: 39 % (ref 37–48.5)
HGB BLD-MCNC: 12.9 G/DL (ref 12–16)
IMM GRANULOCYTES # BLD AUTO: 0.01 K/UL (ref 0–0.04)
IMM GRANULOCYTES NFR BLD AUTO: 0.2 % (ref 0–0.5)
LYMPHOCYTES # BLD AUTO: 1.5 K/UL (ref 1–4.8)
LYMPHOCYTES NFR BLD: 29.4 % (ref 18–48)
MCH RBC QN AUTO: 30.9 PG (ref 27–31)
MCHC RBC AUTO-ENTMCNC: 33.1 G/DL (ref 32–36)
MCV RBC AUTO: 94 FL (ref 82–98)
MONOCYTES # BLD AUTO: 0.3 K/UL (ref 0.3–1)
MONOCYTES NFR BLD: 5.9 % (ref 4–15)
NEUTROPHILS # BLD AUTO: 3 K/UL (ref 1.8–7.7)
NEUTROPHILS NFR BLD: 61.7 % (ref 38–73)
NRBC BLD-RTO: 0 /100 WBC
PLATELET # BLD AUTO: 384 K/UL (ref 150–450)
PMV BLD AUTO: 10.9 FL (ref 9.2–12.9)
POTASSIUM SERPL-SCNC: 3.3 MMOL/L (ref 3.5–5.1)
PROT SERPL-MCNC: 7.8 G/DL (ref 6–8.4)
RBC # BLD AUTO: 4.17 M/UL (ref 4–5.4)
SODIUM SERPL-SCNC: 135 MMOL/L (ref 136–145)
WBC # BLD AUTO: 4.93 K/UL (ref 3.9–12.7)

## 2021-10-14 PROCEDURE — 99999 PR PBB SHADOW E&M-EST. PATIENT-LVL IV: ICD-10-PCS | Mod: PBBFAC,,, | Performed by: PHYSICIAN ASSISTANT

## 2021-10-14 PROCEDURE — 87205 SMEAR GRAM STAIN: CPT | Performed by: PHYSICIAN ASSISTANT

## 2021-10-14 PROCEDURE — 99215 OFFICE O/P EST HI 40 MIN: CPT | Mod: S$PBB,,, | Performed by: PHYSICIAN ASSISTANT

## 2021-10-14 PROCEDURE — 86140 C-REACTIVE PROTEIN: CPT | Performed by: PHYSICIAN ASSISTANT

## 2021-10-14 PROCEDURE — 99214 OFFICE O/P EST MOD 30 MIN: CPT | Mod: PBBFAC | Performed by: PHYSICIAN ASSISTANT

## 2021-10-14 PROCEDURE — 87186 SC STD MICRODIL/AGAR DIL: CPT | Performed by: PHYSICIAN ASSISTANT

## 2021-10-14 PROCEDURE — 87077 CULTURE AEROBIC IDENTIFY: CPT | Performed by: PHYSICIAN ASSISTANT

## 2021-10-14 PROCEDURE — 80053 COMPREHEN METABOLIC PANEL: CPT | Performed by: PHYSICIAN ASSISTANT

## 2021-10-14 PROCEDURE — 99999 PR PBB SHADOW E&M-EST. PATIENT-LVL IV: CPT | Mod: PBBFAC,,, | Performed by: PHYSICIAN ASSISTANT

## 2021-10-14 PROCEDURE — 87206 SMEAR FLUORESCENT/ACID STAI: CPT | Performed by: PHYSICIAN ASSISTANT

## 2021-10-14 PROCEDURE — 87116 MYCOBACTERIA CULTURE: CPT | Performed by: PHYSICIAN ASSISTANT

## 2021-10-14 PROCEDURE — 87070 CULTURE OTHR SPECIMN AEROBIC: CPT | Performed by: PHYSICIAN ASSISTANT

## 2021-10-14 PROCEDURE — 85652 RBC SED RATE AUTOMATED: CPT | Performed by: PHYSICIAN ASSISTANT

## 2021-10-14 PROCEDURE — 85025 COMPLETE CBC W/AUTO DIFF WBC: CPT | Performed by: PHYSICIAN ASSISTANT

## 2021-10-14 PROCEDURE — 36415 COLL VENOUS BLD VENIPUNCTURE: CPT | Performed by: PHYSICIAN ASSISTANT

## 2021-10-14 PROCEDURE — 87102 FUNGUS ISOLATION CULTURE: CPT | Performed by: PHYSICIAN ASSISTANT

## 2021-10-14 PROCEDURE — 87015 SPECIMEN INFECT AGNT CONCNTJ: CPT | Performed by: PHYSICIAN ASSISTANT

## 2021-10-14 PROCEDURE — 99215 PR OFFICE/OUTPT VISIT, EST, LEVL V, 40-54 MIN: ICD-10-PCS | Mod: S$PBB,,, | Performed by: PHYSICIAN ASSISTANT

## 2021-10-14 PROCEDURE — 87075 CULTR BACTERIA EXCEPT BLOOD: CPT | Performed by: PHYSICIAN ASSISTANT

## 2021-10-14 RX ORDER — PREDNISONE 50 MG/1
50 TABLET ORAL 3 TIMES DAILY PRN
Qty: 3 TABLET | Refills: 0 | Status: ON HOLD | OUTPATIENT
Start: 2021-10-21 | End: 2021-10-22 | Stop reason: HOSPADM

## 2021-10-14 RX ORDER — DIPHENHYDRAMINE HCL 25 MG
25 CAPSULE ORAL 3 TIMES DAILY PRN
Qty: 3 CAPSULE | Refills: 0 | Status: ON HOLD | OUTPATIENT
Start: 2021-10-21 | End: 2021-10-22 | Stop reason: HOSPADM

## 2021-10-14 RX ORDER — FAMOTIDINE 20 MG/1
20 TABLET, FILM COATED ORAL 3 TIMES DAILY PRN
Qty: 3 TABLET | Refills: 0 | Status: ON HOLD | OUTPATIENT
Start: 2021-10-21 | End: 2021-10-22 | Stop reason: HOSPADM

## 2021-10-15 ENCOUNTER — TELEPHONE (OUTPATIENT)
Dept: INFECTIOUS DISEASES | Facility: CLINIC | Age: 56
End: 2021-10-15

## 2021-10-18 PROBLEM — H51.9: Status: ACTIVE | Noted: 2021-10-18

## 2021-10-18 LAB — BACTERIA SPEC AEROBE CULT: ABNORMAL

## 2021-10-19 LAB — BACTERIA SPEC ANAEROBE CULT: NORMAL

## 2021-10-22 ENCOUNTER — ANESTHESIA EVENT (OUTPATIENT)
Dept: CARDIOLOGY | Facility: HOSPITAL | Age: 56
End: 2021-10-22
Payer: MEDICARE

## 2021-10-22 ENCOUNTER — HOSPITAL ENCOUNTER (OUTPATIENT)
Facility: HOSPITAL | Age: 56
Discharge: HOME | End: 2021-10-22
Attending: INTERNAL MEDICINE | Admitting: INTERNAL MEDICINE
Payer: MEDICARE

## 2021-10-22 ENCOUNTER — ANESTHESIA (OUTPATIENT)
Dept: CARDIOLOGY | Facility: HOSPITAL | Age: 56
End: 2021-10-22
Payer: MEDICARE

## 2021-10-22 VITALS
OXYGEN SATURATION: 100 % | SYSTOLIC BLOOD PRESSURE: 148 MMHG | BODY MASS INDEX: 27.82 KG/M2 | RESPIRATION RATE: 19 BRPM | WEIGHT: 167 LBS | DIASTOLIC BLOOD PRESSURE: 79 MMHG | HEART RATE: 90 BPM | HEIGHT: 65 IN | TEMPERATURE: 98 F

## 2021-10-22 DIAGNOSIS — Z01.818 PRE-OP TESTING: ICD-10-CM

## 2021-10-22 DIAGNOSIS — I70.229 CRITICAL LOWER LIMB ISCHEMIA: ICD-10-CM

## 2021-10-22 DIAGNOSIS — I73.9 PAD (PERIPHERAL ARTERY DISEASE): ICD-10-CM

## 2021-10-22 DIAGNOSIS — Z01.810 PRE-OPERATIVE CARDIOVASCULAR EXAMINATION: Primary | ICD-10-CM

## 2021-10-22 LAB
BASOPHILS # BLD AUTO: 0.04 K/UL (ref 0–0.2)
BASOPHILS NFR BLD: 0.6 % (ref 0–1.9)
DIFFERENTIAL METHOD: ABNORMAL
EOSINOPHIL # BLD AUTO: 0.1 K/UL (ref 0–0.5)
EOSINOPHIL NFR BLD: 1.4 % (ref 0–8)
ERYTHROCYTE [DISTWIDTH] IN BLOOD BY AUTOMATED COUNT: 12.6 % (ref 11.5–14.5)
HCT VFR BLD AUTO: 38.8 % (ref 37–48.5)
HGB BLD-MCNC: 13.1 G/DL (ref 12–16)
IMM GRANULOCYTES # BLD AUTO: 0.01 K/UL (ref 0–0.04)
IMM GRANULOCYTES NFR BLD AUTO: 0.2 % (ref 0–0.5)
LYMPHOCYTES # BLD AUTO: 1.7 K/UL (ref 1–4.8)
LYMPHOCYTES NFR BLD: 27.3 % (ref 18–48)
MCH RBC QN AUTO: 31.1 PG (ref 27–31)
MCHC RBC AUTO-ENTMCNC: 33.8 G/DL (ref 32–36)
MCV RBC AUTO: 92 FL (ref 82–98)
MONOCYTES # BLD AUTO: 0.4 K/UL (ref 0.3–1)
MONOCYTES NFR BLD: 6 % (ref 4–15)
NEUTROPHILS # BLD AUTO: 4.1 K/UL (ref 1.8–7.7)
NEUTROPHILS NFR BLD: 64.5 % (ref 38–73)
NRBC BLD-RTO: 0 /100 WBC
PLATELET # BLD AUTO: 399 K/UL (ref 150–450)
PMV BLD AUTO: 11.3 FL (ref 9.2–12.9)
POC ACTIVATED CLOTTING TIME K: 235 SEC (ref 74–137)
POC ACTIVATED CLOTTING TIME K: 235 SEC (ref 74–137)
POC ACTIVATED CLOTTING TIME K: 241 SEC (ref 74–137)
POC ACTIVATED CLOTTING TIME K: 241 SEC (ref 74–137)
POC ACTIVATED CLOTTING TIME K: 246 SEC (ref 74–137)
POCT GLUCOSE: 401 MG/DL (ref 70–110)
POCT GLUCOSE: 409 MG/DL (ref 70–110)
RBC # BLD AUTO: 4.21 M/UL (ref 4–5.4)
SAMPLE: ABNORMAL
WBC # BLD AUTO: 6.38 K/UL (ref 3.9–12.7)

## 2021-10-22 PROCEDURE — 37225 HC FEM/POPL REVAS W/ATHER: CPT | Mod: 59 | Performed by: INTERNAL MEDICINE

## 2021-10-22 PROCEDURE — 85025 COMPLETE CBC W/AUTO DIFF WBC: CPT | Performed by: INTERNAL MEDICINE

## 2021-10-22 PROCEDURE — 37252 PR IVUS, NON-CORONARY, 1ST VESSEL: ICD-10-PCS | Mod: RT,,, | Performed by: INTERNAL MEDICINE

## 2021-10-22 PROCEDURE — 37186 SEC ART THROMBECTOMY ADD-ON: CPT | Mod: 59,,, | Performed by: INTERNAL MEDICINE

## 2021-10-22 PROCEDURE — 25500020 PHARM REV CODE 255: Performed by: INTERNAL MEDICINE

## 2021-10-22 PROCEDURE — C2623 CATH, TRANSLUMIN, DRUG-COAT: HCPCS | Performed by: INTERNAL MEDICINE

## 2021-10-22 PROCEDURE — P9045 ALBUMIN (HUMAN), 5%, 250 ML: HCPCS | Mod: JG | Performed by: NURSE ANESTHETIST, CERTIFIED REGISTERED

## 2021-10-22 PROCEDURE — 37252 INTRVASC US NONCORONARY 1ST: CPT | Performed by: INTERNAL MEDICINE

## 2021-10-22 PROCEDURE — C1757 CATH, THROMBECTOMY/EMBOLECT: HCPCS | Performed by: INTERNAL MEDICINE

## 2021-10-22 PROCEDURE — C1894 INTRO/SHEATH, NON-LASER: HCPCS | Performed by: INTERNAL MEDICINE

## 2021-10-22 PROCEDURE — 25000003 PHARM REV CODE 250: Performed by: ANESTHESIOLOGY

## 2021-10-22 PROCEDURE — 63600175 PHARM REV CODE 636 W HCPCS: Mod: JG | Performed by: NURSE ANESTHETIST, CERTIFIED REGISTERED

## 2021-10-22 PROCEDURE — 37000008 HC ANESTHESIA 1ST 15 MINUTES: Performed by: INTERNAL MEDICINE

## 2021-10-22 PROCEDURE — C1887 CATHETER, GUIDING: HCPCS | Performed by: INTERNAL MEDICINE

## 2021-10-22 PROCEDURE — 37184 PRIM ART M-THRMBC 1ST VSL: CPT | Performed by: INTERNAL MEDICINE

## 2021-10-22 PROCEDURE — 37186 PR SECONDARY ART THROMBECTOMY, INCL GUID & RX INJ, ADD ON CODE: ICD-10-PCS | Mod: 59,,, | Performed by: INTERNAL MEDICINE

## 2021-10-22 PROCEDURE — 37000009 HC ANESTHESIA EA ADD 15 MINS: Performed by: INTERNAL MEDICINE

## 2021-10-22 PROCEDURE — 99220 PR INITIAL OBSERVATION CARE,LEVL III: ICD-10-PCS | Mod: 25,,, | Performed by: INTERNAL MEDICINE

## 2021-10-22 PROCEDURE — C1753 CATH, INTRAVAS ULTRASOUND: HCPCS | Performed by: INTERNAL MEDICINE

## 2021-10-22 PROCEDURE — C1724 CATH, TRANS ATHEREC,ROTATION: HCPCS | Performed by: INTERNAL MEDICINE

## 2021-10-22 PROCEDURE — 63600175 PHARM REV CODE 636 W HCPCS: Performed by: INTERNAL MEDICINE

## 2021-10-22 PROCEDURE — 85347 COAGULATION TIME ACTIVATED: CPT | Performed by: INTERNAL MEDICINE

## 2021-10-22 PROCEDURE — 93010 EKG 12-LEAD: ICD-10-PCS | Mod: ,,, | Performed by: INTERNAL MEDICINE

## 2021-10-22 PROCEDURE — 25000003 PHARM REV CODE 250: Performed by: NURSE ANESTHETIST, CERTIFIED REGISTERED

## 2021-10-22 PROCEDURE — C1725 CATH, TRANSLUMIN NON-LASER: HCPCS | Performed by: INTERNAL MEDICINE

## 2021-10-22 PROCEDURE — C1760 CLOSURE DEV, VASC: HCPCS | Performed by: INTERNAL MEDICINE

## 2021-10-22 PROCEDURE — 93010 ELECTROCARDIOGRAM REPORT: CPT | Mod: ,,, | Performed by: INTERNAL MEDICINE

## 2021-10-22 PROCEDURE — 99220 PR INITIAL OBSERVATION CARE,LEVL III: CPT | Mod: 25,,, | Performed by: INTERNAL MEDICINE

## 2021-10-22 PROCEDURE — 36415 COLL VENOUS BLD VENIPUNCTURE: CPT | Performed by: INTERNAL MEDICINE

## 2021-10-22 PROCEDURE — 25000003 PHARM REV CODE 250: Performed by: INTERNAL MEDICINE

## 2021-10-22 PROCEDURE — 37225 PR FEM/POPL REVAS W/ATHERECTOMY: CPT | Mod: RT,,, | Performed by: INTERNAL MEDICINE

## 2021-10-22 PROCEDURE — C1884 EMBOLIZATION PROTECT SYST: HCPCS | Performed by: INTERNAL MEDICINE

## 2021-10-22 PROCEDURE — 37252 INTRVASC US NONCORONARY 1ST: CPT | Mod: RT,,, | Performed by: INTERNAL MEDICINE

## 2021-10-22 PROCEDURE — 37225 PR FEM/POPL REVAS W/ATHERECTOMY: ICD-10-PCS | Mod: RT,,, | Performed by: INTERNAL MEDICINE

## 2021-10-22 PROCEDURE — 93005 ELECTROCARDIOGRAM TRACING: CPT

## 2021-10-22 PROCEDURE — C1769 GUIDE WIRE: HCPCS | Performed by: INTERNAL MEDICINE

## 2021-10-22 RX ORDER — SODIUM CHLORIDE 9 MG/ML
INJECTION, SOLUTION INTRAVENOUS CONTINUOUS
Status: ACTIVE | OUTPATIENT
Start: 2021-10-22 | End: 2021-10-22

## 2021-10-22 RX ORDER — ROCURONIUM BROMIDE 10 MG/ML
INJECTION, SOLUTION INTRAVENOUS
Status: DISCONTINUED | OUTPATIENT
Start: 2021-10-22 | End: 2021-10-22

## 2021-10-22 RX ORDER — ONDANSETRON 4 MG/1
8 TABLET, ORALLY DISINTEGRATING ORAL EVERY 8 HOURS PRN
Status: DISCONTINUED | OUTPATIENT
Start: 2021-10-22 | End: 2021-10-22 | Stop reason: HOSPADM

## 2021-10-22 RX ORDER — IODIXANOL 320 MG/ML
INJECTION, SOLUTION INTRAVASCULAR
Status: DISCONTINUED | OUTPATIENT
Start: 2021-10-22 | End: 2021-10-22 | Stop reason: HOSPADM

## 2021-10-22 RX ORDER — SUCCINYLCHOLINE CHLORIDE 20 MG/ML
INJECTION INTRAMUSCULAR; INTRAVENOUS
Status: DISCONTINUED | OUTPATIENT
Start: 2021-10-22 | End: 2021-10-22

## 2021-10-22 RX ORDER — ETOMIDATE 2 MG/ML
INJECTION INTRAVENOUS
Status: DISCONTINUED | OUTPATIENT
Start: 2021-10-22 | End: 2021-10-22

## 2021-10-22 RX ORDER — LIDOCAINE HYDROCHLORIDE 10 MG/ML
INJECTION, SOLUTION EPIDURAL; INFILTRATION; INTRACAUDAL; PERINEURAL
Status: DISCONTINUED | OUTPATIENT
Start: 2021-10-22 | End: 2021-10-22 | Stop reason: HOSPADM

## 2021-10-22 RX ORDER — CEFAZOLIN SODIUM 1 G/3ML
INJECTION, POWDER, FOR SOLUTION INTRAMUSCULAR; INTRAVENOUS
Status: DISCONTINUED | OUTPATIENT
Start: 2021-10-22 | End: 2021-10-22 | Stop reason: HOSPADM

## 2021-10-22 RX ORDER — ALBUMIN HUMAN 50 G/1000ML
SOLUTION INTRAVENOUS CONTINUOUS PRN
Status: DISCONTINUED | OUTPATIENT
Start: 2021-10-22 | End: 2021-10-22

## 2021-10-22 RX ORDER — SODIUM CHLORIDE 0.9 % (FLUSH) 0.9 %
10 SYRINGE (ML) INJECTION
Status: CANCELLED | OUTPATIENT
Start: 2021-10-22

## 2021-10-22 RX ORDER — PROPOFOL 10 MG/ML
VIAL (ML) INTRAVENOUS
Status: DISCONTINUED | OUTPATIENT
Start: 2021-10-22 | End: 2021-10-22

## 2021-10-22 RX ORDER — PHENYLEPHRINE HYDROCHLORIDE 10 MG/ML
INJECTION INTRAVENOUS
Status: DISCONTINUED | OUTPATIENT
Start: 2021-10-22 | End: 2021-10-22

## 2021-10-22 RX ORDER — ACETAMINOPHEN 325 MG/1
650 TABLET ORAL EVERY 4 HOURS PRN
Status: DISCONTINUED | OUTPATIENT
Start: 2021-10-22 | End: 2021-10-22 | Stop reason: HOSPADM

## 2021-10-22 RX ORDER — HYDROMORPHONE HYDROCHLORIDE 2 MG/ML
0.2 INJECTION, SOLUTION INTRAMUSCULAR; INTRAVENOUS; SUBCUTANEOUS EVERY 5 MIN PRN
Status: CANCELLED | OUTPATIENT
Start: 2021-10-22

## 2021-10-22 RX ORDER — CLOPIDOGREL BISULFATE 75 MG/1
75 TABLET ORAL DAILY
Qty: 90 TABLET | Refills: 3 | Status: SHIPPED | OUTPATIENT
Start: 2021-10-22 | End: 2022-05-03

## 2021-10-22 RX ORDER — FENTANYL CITRATE 50 UG/ML
INJECTION, SOLUTION INTRAMUSCULAR; INTRAVENOUS
Status: DISCONTINUED | OUTPATIENT
Start: 2021-10-22 | End: 2021-10-22

## 2021-10-22 RX ORDER — ONDANSETRON 2 MG/ML
INJECTION INTRAMUSCULAR; INTRAVENOUS
Status: DISCONTINUED | OUTPATIENT
Start: 2021-10-22 | End: 2021-10-22

## 2021-10-22 RX ORDER — MORPHINE SULFATE 2 MG/ML
2 INJECTION, SOLUTION INTRAMUSCULAR; INTRAVENOUS ONCE
Status: COMPLETED | OUTPATIENT
Start: 2021-10-22 | End: 2021-10-22

## 2021-10-22 RX ORDER — MIDAZOLAM HYDROCHLORIDE 1 MG/ML
INJECTION, SOLUTION INTRAMUSCULAR; INTRAVENOUS
Status: DISCONTINUED | OUTPATIENT
Start: 2021-10-22 | End: 2021-10-22

## 2021-10-22 RX ORDER — HEPARIN SODIUM 200 [USP'U]/100ML
INJECTION, SOLUTION INTRAVENOUS
Status: DISCONTINUED | OUTPATIENT
Start: 2021-10-22 | End: 2021-10-22 | Stop reason: HOSPADM

## 2021-10-22 RX ORDER — HEPARIN SODIUM 1000 [USP'U]/ML
INJECTION, SOLUTION INTRAVENOUS; SUBCUTANEOUS
Status: DISCONTINUED | OUTPATIENT
Start: 2021-10-22 | End: 2021-10-22 | Stop reason: HOSPADM

## 2021-10-22 RX ADMIN — SUCCINYLCHOLINE CHLORIDE 80 MG: 20 INJECTION, SOLUTION INTRAMUSCULAR; INTRAVENOUS at 09:10

## 2021-10-22 RX ADMIN — ALBUMIN (HUMAN): 12.5 SOLUTION INTRAVENOUS at 09:10

## 2021-10-22 RX ADMIN — MIDAZOLAM 2 MG: 1 INJECTION INTRAMUSCULAR; INTRAVENOUS at 09:10

## 2021-10-22 RX ADMIN — PHENYLEPHRINE HYDROCHLORIDE 200 MCG: 10 INJECTION INTRAVENOUS at 10:10

## 2021-10-22 RX ADMIN — ESMOLOL HYDROCHLORIDE 25 MG: 10 INJECTION INTRAVENOUS at 09:10

## 2021-10-22 RX ADMIN — GLYCOPYRROLATE 0.1 MG: 0.2 INJECTION, SOLUTION INTRAMUSCULAR; INTRAVITREAL at 09:10

## 2021-10-22 RX ADMIN — PHENYLEPHRINE HYDROCHLORIDE 150 MCG: 10 INJECTION INTRAVENOUS at 11:10

## 2021-10-22 RX ADMIN — PROPOFOL 50 MG: 10 INJECTION, EMULSION INTRAVENOUS at 09:10

## 2021-10-22 RX ADMIN — FENTANYL CITRATE 50 MCG: 50 INJECTION, SOLUTION INTRAMUSCULAR; INTRAVENOUS at 10:10

## 2021-10-22 RX ADMIN — ONDANSETRON 8 MG: 2 INJECTION, SOLUTION INTRAMUSCULAR; INTRAVENOUS at 12:10

## 2021-10-22 RX ADMIN — MORPHINE SULFATE 2 MG: 2 INJECTION, SOLUTION INTRAMUSCULAR; INTRAVENOUS at 01:10

## 2021-10-22 RX ADMIN — PHENYLEPHRINE HYDROCHLORIDE 0.25 MCG/KG/MIN: 10 INJECTION INTRAVENOUS at 09:10

## 2021-10-22 RX ADMIN — ETOMIDATE 8 MG: 2 INJECTION, SOLUTION INTRAVENOUS at 09:10

## 2021-10-22 RX ADMIN — PHENYLEPHRINE HYDROCHLORIDE 200 MCG: 10 INJECTION INTRAVENOUS at 11:10

## 2021-10-22 RX ADMIN — FENTANYL CITRATE 50 MCG: 50 INJECTION, SOLUTION INTRAMUSCULAR; INTRAVENOUS at 09:10

## 2021-10-22 RX ADMIN — LIDOCAINE HYDROCHLORIDE 100 MG: 10 INJECTION, SOLUTION EPIDURAL; INFILTRATION; INTRACAUDAL; PERINEURAL at 09:10

## 2021-10-22 RX ADMIN — ROCURONIUM BROMIDE 50 MG: 10 INJECTION, SOLUTION INTRAVENOUS at 09:10

## 2021-10-22 RX ADMIN — SUGAMMADEX 200 MG: 100 INJECTION, SOLUTION INTRAVENOUS at 12:10

## 2021-10-22 RX ADMIN — FENTANYL CITRATE 50 MCG: 50 INJECTION, SOLUTION INTRAMUSCULAR; INTRAVENOUS at 11:10

## 2021-10-25 ENCOUNTER — PATIENT MESSAGE (OUTPATIENT)
Dept: INFECTIOUS DISEASES | Facility: CLINIC | Age: 56
End: 2021-10-25
Payer: MEDICARE

## 2021-11-08 ENCOUNTER — TELEPHONE (OUTPATIENT)
Dept: CARDIOLOGY | Facility: CLINIC | Age: 56
End: 2021-11-08
Payer: MEDICARE

## 2021-11-17 LAB — FUNGUS SPEC CULT: NORMAL

## 2021-11-19 ENCOUNTER — PATIENT OUTREACH (OUTPATIENT)
Dept: ADMINISTRATIVE | Facility: OTHER | Age: 56
End: 2021-11-19
Payer: MEDICARE

## 2021-11-19 ENCOUNTER — TELEPHONE (OUTPATIENT)
Dept: NEUROLOGY | Facility: CLINIC | Age: 56
End: 2021-11-19
Payer: MEDICARE

## 2021-11-19 DIAGNOSIS — E11.65 TYPE 2 DIABETES MELLITUS WITH HYPERGLYCEMIA, WITH LONG-TERM CURRENT USE OF INSULIN: Primary | ICD-10-CM

## 2021-11-19 DIAGNOSIS — Z79.4 TYPE 2 DIABETES MELLITUS WITH HYPERGLYCEMIA, WITH LONG-TERM CURRENT USE OF INSULIN: Primary | ICD-10-CM

## 2021-11-30 ENCOUNTER — TELEPHONE (OUTPATIENT)
Dept: NEUROLOGY | Facility: CLINIC | Age: 56
End: 2021-11-30
Payer: MEDICARE

## 2021-12-02 LAB
ACID FAST MOD KINY STN SPEC: NORMAL
MYCOBACTERIUM SPEC QL CULT: NORMAL

## 2021-12-03 ENCOUNTER — OFFICE VISIT (OUTPATIENT)
Dept: ENDOCRINOLOGY | Facility: CLINIC | Age: 56
End: 2021-12-03
Payer: MEDICARE

## 2021-12-03 ENCOUNTER — LAB VISIT (OUTPATIENT)
Dept: LAB | Facility: HOSPITAL | Age: 56
End: 2021-12-03
Payer: MEDICARE

## 2021-12-03 VITALS
DIASTOLIC BLOOD PRESSURE: 67 MMHG | HEIGHT: 65 IN | BODY MASS INDEX: 27.79 KG/M2 | RESPIRATION RATE: 18 BRPM | OXYGEN SATURATION: 95 % | HEART RATE: 105 BPM | SYSTOLIC BLOOD PRESSURE: 103 MMHG

## 2021-12-03 DIAGNOSIS — Z79.4 TYPE 2 DIABETES MELLITUS WITH DIABETIC NEPHROPATHY, WITH LONG-TERM CURRENT USE OF INSULIN: Primary | ICD-10-CM

## 2021-12-03 DIAGNOSIS — E11.21 TYPE 2 DIABETES MELLITUS WITH DIABETIC NEPHROPATHY, WITH LONG-TERM CURRENT USE OF INSULIN: Primary | ICD-10-CM

## 2021-12-03 DIAGNOSIS — Z79.4 TYPE 2 DIABETES MELLITUS WITH DIABETIC NEPHROPATHY, WITH LONG-TERM CURRENT USE OF INSULIN: ICD-10-CM

## 2021-12-03 DIAGNOSIS — E78.2 MIXED HYPERLIPIDEMIA: Chronic | ICD-10-CM

## 2021-12-03 DIAGNOSIS — E11.65 TYPE 2 DIABETES MELLITUS WITH HYPERGLYCEMIA, WITH LONG-TERM CURRENT USE OF INSULIN: ICD-10-CM

## 2021-12-03 DIAGNOSIS — E11.21 TYPE 2 DIABETES MELLITUS WITH DIABETIC NEPHROPATHY, WITH LONG-TERM CURRENT USE OF INSULIN: ICD-10-CM

## 2021-12-03 DIAGNOSIS — Z79.4 TYPE 2 DIABETES MELLITUS WITH HYPERGLYCEMIA, WITH LONG-TERM CURRENT USE OF INSULIN: ICD-10-CM

## 2021-12-03 LAB
CHOLEST SERPL-MCNC: 266 MG/DL (ref 120–199)
CHOLEST/HDLC SERPL: 4.8 {RATIO} (ref 2–5)
ESTIMATED AVG GLUCOSE: 229 MG/DL (ref 68–131)
HBA1C MFR BLD: 9.6 % (ref 4–5.6)
HDLC SERPL-MCNC: 55 MG/DL (ref 40–75)
HDLC SERPL: 20.7 % (ref 20–50)
LDLC SERPL CALC-MCNC: 177.6 MG/DL (ref 63–159)
NONHDLC SERPL-MCNC: 211 MG/DL
TRIGL SERPL-MCNC: 167 MG/DL (ref 30–150)

## 2021-12-03 PROCEDURE — 36415 COLL VENOUS BLD VENIPUNCTURE: CPT | Performed by: INTERNAL MEDICINE

## 2021-12-03 PROCEDURE — 99214 OFFICE O/P EST MOD 30 MIN: CPT | Mod: S$PBB,,, | Performed by: INTERNAL MEDICINE

## 2021-12-03 PROCEDURE — 80061 LIPID PANEL: CPT | Performed by: INTERNAL MEDICINE

## 2021-12-03 PROCEDURE — 99214 OFFICE O/P EST MOD 30 MIN: CPT | Mod: PBBFAC | Performed by: INTERNAL MEDICINE

## 2021-12-03 PROCEDURE — 83036 HEMOGLOBIN GLYCOSYLATED A1C: CPT | Performed by: INTERNAL MEDICINE

## 2021-12-03 PROCEDURE — 99999 PR PBB SHADOW E&M-EST. PATIENT-LVL IV: CPT | Mod: PBBFAC,,, | Performed by: INTERNAL MEDICINE

## 2021-12-03 PROCEDURE — 99999 PR PBB SHADOW E&M-EST. PATIENT-LVL IV: ICD-10-PCS | Mod: PBBFAC,,, | Performed by: INTERNAL MEDICINE

## 2021-12-03 PROCEDURE — 99214 PR OFFICE/OUTPT VISIT, EST, LEVL IV, 30-39 MIN: ICD-10-PCS | Mod: S$PBB,,, | Performed by: INTERNAL MEDICINE

## 2021-12-03 RX ORDER — BLOOD-GLUCOSE TRANSMITTER
EACH MISCELLANEOUS
Qty: 1 EACH | Refills: 3 | Status: SHIPPED | OUTPATIENT
Start: 2021-12-03 | End: 2022-05-17 | Stop reason: SDUPTHER

## 2021-12-03 RX ORDER — BLOOD-GLUCOSE SENSOR
EACH MISCELLANEOUS
Qty: 3 EACH | Refills: 11 | Status: SHIPPED | OUTPATIENT
Start: 2021-12-03 | End: 2022-05-17 | Stop reason: SDUPTHER

## 2021-12-03 RX ORDER — BLOOD-GLUCOSE,RECEIVER,CONT
EACH MISCELLANEOUS
Qty: 1 EACH | Refills: 0 | Status: SHIPPED | OUTPATIENT
Start: 2021-12-03 | End: 2022-05-17

## 2021-12-03 RX ORDER — PREGABALIN 50 MG/1
50 CAPSULE ORAL 3 TIMES DAILY
Qty: 90 CAPSULE | Refills: 0 | Status: ON HOLD | OUTPATIENT
Start: 2021-12-03 | End: 2022-01-10 | Stop reason: SDUPTHER

## 2021-12-07 ENCOUNTER — TELEPHONE (OUTPATIENT)
Dept: CARDIOLOGY | Facility: CLINIC | Age: 56
End: 2021-12-07
Payer: MEDICARE

## 2021-12-07 ENCOUNTER — TELEPHONE (OUTPATIENT)
Dept: ENDOCRINOLOGY | Facility: CLINIC | Age: 56
End: 2021-12-07
Payer: MEDICARE

## 2021-12-10 ENCOUNTER — TELEPHONE (OUTPATIENT)
Dept: ENDOCRINOLOGY | Facility: CLINIC | Age: 56
End: 2021-12-10
Payer: MEDICARE

## 2021-12-15 ENCOUNTER — TELEPHONE (OUTPATIENT)
Dept: CARDIOLOGY | Facility: CLINIC | Age: 56
End: 2021-12-15
Payer: MEDICARE

## 2021-12-21 ENCOUNTER — HOSPITAL ENCOUNTER (INPATIENT)
Facility: HOSPITAL | Age: 56
LOS: 20 days | Discharge: NURSING, REHAB OR LONG-TERM CARE | DRG: 240 | End: 2022-01-10
Attending: EMERGENCY MEDICINE | Admitting: INTERNAL MEDICINE
Payer: MEDICARE

## 2021-12-21 DIAGNOSIS — M86.9 OSTEOMYELITIS OF RIGHT FOOT, UNSPECIFIED TYPE: ICD-10-CM

## 2021-12-21 DIAGNOSIS — I96 GANGRENE: Primary | ICD-10-CM

## 2021-12-21 DIAGNOSIS — Z79.4 TYPE 2 DIABETES MELLITUS WITH DIABETIC NEPHROPATHY, WITH LONG-TERM CURRENT USE OF INSULIN: ICD-10-CM

## 2021-12-21 DIAGNOSIS — M86.9 OSTEOMYELITIS OF RIGHT FOOT: ICD-10-CM

## 2021-12-21 DIAGNOSIS — E11.621 DIABETIC ULCER OF RIGHT FOOT ASSOCIATED WITH TYPE 2 DIABETES MELLITUS, WITH NECROSIS OF MUSCLE, UNSPECIFIED PART OF FOOT: ICD-10-CM

## 2021-12-21 DIAGNOSIS — I96 GANGRENE OF EXTREMITY: ICD-10-CM

## 2021-12-21 DIAGNOSIS — L03.90 CELLULITIS, UNSPECIFIED CELLULITIS SITE: ICD-10-CM

## 2021-12-21 DIAGNOSIS — L97.415 DIABETIC ULCER OF RIGHT MIDFOOT ASSOCIATED WITH TYPE 2 DIABETES MELLITUS, WITH MUSCLE INVOLVEMENT WITHOUT EVIDENCE OF NECROSIS: ICD-10-CM

## 2021-12-21 DIAGNOSIS — R94.31 QT PROLONGATION: ICD-10-CM

## 2021-12-21 DIAGNOSIS — E11.621 DIABETIC ULCER OF RIGHT FOOT ASSOCIATED WITH TYPE 2 DIABETES MELLITUS, WITH NECROSIS OF BONE, UNSPECIFIED PART OF FOOT: ICD-10-CM

## 2021-12-21 DIAGNOSIS — E11.21 TYPE 2 DIABETES MELLITUS WITH DIABETIC NEPHROPATHY, WITH LONG-TERM CURRENT USE OF INSULIN: ICD-10-CM

## 2021-12-21 DIAGNOSIS — R07.9 CHEST PAIN: ICD-10-CM

## 2021-12-21 DIAGNOSIS — R52 PAIN: ICD-10-CM

## 2021-12-21 DIAGNOSIS — L97.513 DIABETIC ULCER OF RIGHT FOOT ASSOCIATED WITH TYPE 2 DIABETES MELLITUS, WITH NECROSIS OF MUSCLE, UNSPECIFIED PART OF FOOT: ICD-10-CM

## 2021-12-21 DIAGNOSIS — Z89.511 S/P BKA (BELOW KNEE AMPUTATION) UNILATERAL, RIGHT: ICD-10-CM

## 2021-12-21 DIAGNOSIS — R20.9 COLD FOOT: ICD-10-CM

## 2021-12-21 DIAGNOSIS — L97.514 DIABETIC ULCER OF RIGHT FOOT ASSOCIATED WITH TYPE 2 DIABETES MELLITUS, WITH NECROSIS OF BONE, UNSPECIFIED PART OF FOOT: ICD-10-CM

## 2021-12-21 DIAGNOSIS — N19 UREMIA: ICD-10-CM

## 2021-12-21 DIAGNOSIS — E11.621 DIABETIC ULCER OF RIGHT MIDFOOT ASSOCIATED WITH TYPE 2 DIABETES MELLITUS, WITH MUSCLE INVOLVEMENT WITHOUT EVIDENCE OF NECROSIS: ICD-10-CM

## 2021-12-21 PROBLEM — L97.519 DIABETIC ULCER OF RIGHT FOOT ASSOCIATED WITH TYPE 2 DIABETES MELLITUS: Status: ACTIVE | Noted: 2021-12-21

## 2021-12-21 LAB
ALBUMIN SERPL BCP-MCNC: 1.6 G/DL (ref 3.5–5.2)
ALP SERPL-CCNC: 132 U/L (ref 55–135)
ALT SERPL W/O P-5'-P-CCNC: 7 U/L (ref 10–44)
ANION GAP SERPL CALC-SCNC: 11 MMOL/L (ref 8–16)
AST SERPL-CCNC: 12 U/L (ref 10–40)
BACTERIA #/AREA URNS HPF: ABNORMAL /HPF
BASOPHILS # BLD AUTO: 0.08 K/UL (ref 0–0.2)
BASOPHILS NFR BLD: 0.5 % (ref 0–1.9)
BILIRUB SERPL-MCNC: 0.2 MG/DL (ref 0.1–1)
BILIRUB UR QL STRIP: NEGATIVE
BUN SERPL-MCNC: 33 MG/DL (ref 6–20)
CALCIUM SERPL-MCNC: 8.1 MG/DL (ref 8.7–10.5)
CHLORIDE SERPL-SCNC: 105 MMOL/L (ref 95–110)
CLARITY UR: ABNORMAL
CO2 SERPL-SCNC: 24 MMOL/L (ref 23–29)
COLOR UR: YELLOW
CREAT SERPL-MCNC: 1.6 MG/DL (ref 0.5–1.4)
CRP SERPL-MCNC: 110.2 MG/L (ref 0–8.2)
CTP QC/QA: YES
DIFFERENTIAL METHOD: ABNORMAL
EOSINOPHIL # BLD AUTO: 0.2 K/UL (ref 0–0.5)
EOSINOPHIL NFR BLD: 1.4 % (ref 0–8)
ERYTHROCYTE [DISTWIDTH] IN BLOOD BY AUTOMATED COUNT: 13.9 % (ref 11.5–14.5)
ERYTHROCYTE [SEDIMENTATION RATE] IN BLOOD BY WESTERGREN METHOD: 120 MM/HR (ref 0–20)
EST. GFR  (AFRICAN AMERICAN): 42 ML/MIN/1.73 M^2
EST. GFR  (NON AFRICAN AMERICAN): 36 ML/MIN/1.73 M^2
GLUCOSE SERPL-MCNC: 171 MG/DL (ref 70–110)
GLUCOSE UR QL STRIP: ABNORMAL
HCT VFR BLD AUTO: 24.2 % (ref 37–48.5)
HGB BLD-MCNC: 7.4 G/DL (ref 12–16)
HGB UR QL STRIP: ABNORMAL
HYALINE CASTS #/AREA URNS LPF: 5 /LPF
IMM GRANULOCYTES # BLD AUTO: 0.13 K/UL (ref 0–0.04)
IMM GRANULOCYTES NFR BLD AUTO: 0.9 % (ref 0–0.5)
KETONES UR QL STRIP: NEGATIVE
LACTATE SERPL-SCNC: 1.6 MMOL/L (ref 0.5–2.2)
LEUKOCYTE ESTERASE UR QL STRIP: ABNORMAL
LIPASE SERPL-CCNC: 8 U/L (ref 4–60)
LYMPHOCYTES # BLD AUTO: 2.1 K/UL (ref 1–4.8)
LYMPHOCYTES NFR BLD: 13.8 % (ref 18–48)
MAGNESIUM SERPL-MCNC: 2.1 MG/DL (ref 1.6–2.6)
MCH RBC QN AUTO: 30.2 PG (ref 27–31)
MCHC RBC AUTO-ENTMCNC: 30.6 G/DL (ref 32–36)
MCV RBC AUTO: 99 FL (ref 82–98)
MICROSCOPIC COMMENT: ABNORMAL
MONOCYTES # BLD AUTO: 1.2 K/UL (ref 0.3–1)
MONOCYTES NFR BLD: 7.8 % (ref 4–15)
NEUTROPHILS # BLD AUTO: 11.6 K/UL (ref 1.8–7.7)
NEUTROPHILS NFR BLD: 75.6 % (ref 38–73)
NITRITE UR QL STRIP: NEGATIVE
NRBC BLD-RTO: 0 /100 WBC
PH UR STRIP: 7 [PH] (ref 5–8)
PLATELET # BLD AUTO: 787 K/UL (ref 150–450)
PMV BLD AUTO: 9.5 FL (ref 9.2–12.9)
POCT GLUCOSE: 182 MG/DL (ref 70–110)
POTASSIUM SERPL-SCNC: 4 MMOL/L (ref 3.5–5.1)
PROT SERPL-MCNC: 7.2 G/DL (ref 6–8.4)
PROT UR QL STRIP: ABNORMAL
RBC # BLD AUTO: 2.45 M/UL (ref 4–5.4)
RBC #/AREA URNS HPF: 26 /HPF (ref 0–4)
SARS-COV-2 RDRP RESP QL NAA+PROBE: NEGATIVE
SODIUM SERPL-SCNC: 140 MMOL/L (ref 136–145)
SP GR UR STRIP: 1.01 (ref 1–1.03)
SQUAMOUS #/AREA URNS HPF: 1 /HPF
TROPONIN I SERPL DL<=0.01 NG/ML-MCNC: 0.01 NG/ML (ref 0–0.03)
URN SPEC COLLECT METH UR: ABNORMAL
UROBILINOGEN UR STRIP-ACNC: NEGATIVE EU/DL
WBC # BLD AUTO: 15.29 K/UL (ref 3.9–12.7)
WBC #/AREA URNS HPF: >100 /HPF (ref 0–5)
WBC CLUMPS URNS QL MICRO: ABNORMAL
YEAST URNS QL MICRO: ABNORMAL

## 2021-12-21 PROCEDURE — 84484 ASSAY OF TROPONIN QUANT: CPT | Performed by: EMERGENCY MEDICINE

## 2021-12-21 PROCEDURE — 83735 ASSAY OF MAGNESIUM: CPT | Performed by: EMERGENCY MEDICINE

## 2021-12-21 PROCEDURE — 25000003 PHARM REV CODE 250: Performed by: NURSE PRACTITIONER

## 2021-12-21 PROCEDURE — 87086 URINE CULTURE/COLONY COUNT: CPT | Performed by: NURSE PRACTITIONER

## 2021-12-21 PROCEDURE — 83690 ASSAY OF LIPASE: CPT | Performed by: EMERGENCY MEDICINE

## 2021-12-21 PROCEDURE — 85652 RBC SED RATE AUTOMATED: CPT | Performed by: EMERGENCY MEDICINE

## 2021-12-21 PROCEDURE — 85025 COMPLETE CBC W/AUTO DIFF WBC: CPT | Performed by: EMERGENCY MEDICINE

## 2021-12-21 PROCEDURE — 81000 URINALYSIS NONAUTO W/SCOPE: CPT | Performed by: NURSE PRACTITIONER

## 2021-12-21 PROCEDURE — 36415 COLL VENOUS BLD VENIPUNCTURE: CPT | Performed by: INTERNAL MEDICINE

## 2021-12-21 PROCEDURE — 96374 THER/PROPH/DIAG INJ IV PUSH: CPT

## 2021-12-21 PROCEDURE — 86140 C-REACTIVE PROTEIN: CPT | Performed by: EMERGENCY MEDICINE

## 2021-12-21 PROCEDURE — 63600175 PHARM REV CODE 636 W HCPCS: Performed by: NURSE PRACTITIONER

## 2021-12-21 PROCEDURE — 63600175 PHARM REV CODE 636 W HCPCS: Performed by: EMERGENCY MEDICINE

## 2021-12-21 PROCEDURE — 99285 EMERGENCY DEPT VISIT HI MDM: CPT | Mod: 25

## 2021-12-21 PROCEDURE — 25000003 PHARM REV CODE 250: Performed by: EMERGENCY MEDICINE

## 2021-12-21 PROCEDURE — 83605 ASSAY OF LACTIC ACID: CPT | Performed by: INTERNAL MEDICINE

## 2021-12-21 PROCEDURE — 11000001 HC ACUTE MED/SURG PRIVATE ROOM

## 2021-12-21 PROCEDURE — 80053 COMPREHEN METABOLIC PANEL: CPT | Performed by: EMERGENCY MEDICINE

## 2021-12-21 PROCEDURE — U0002 COVID-19 LAB TEST NON-CDC: HCPCS | Performed by: EMERGENCY MEDICINE

## 2021-12-21 RX ORDER — TALC
6 POWDER (GRAM) TOPICAL NIGHTLY PRN
Status: DISCONTINUED | OUTPATIENT
Start: 2021-12-21 | End: 2022-01-08

## 2021-12-21 RX ORDER — AMLODIPINE BESYLATE 5 MG/1
5 TABLET ORAL DAILY
Status: DISCONTINUED | OUTPATIENT
Start: 2021-12-22 | End: 2021-12-31

## 2021-12-21 RX ORDER — FUROSEMIDE 40 MG/1
40 TABLET ORAL 2 TIMES DAILY
Status: ON HOLD | COMMUNITY
End: 2022-01-10 | Stop reason: SDUPTHER

## 2021-12-21 RX ORDER — GLUCAGON 1 MG
1 KIT INJECTION
Status: DISCONTINUED | OUTPATIENT
Start: 2021-12-21 | End: 2022-01-10 | Stop reason: HOSPADM

## 2021-12-21 RX ORDER — SODIUM CHLORIDE 0.9 % (FLUSH) 0.9 %
10 SYRINGE (ML) INJECTION EVERY 12 HOURS PRN
Status: DISCONTINUED | OUTPATIENT
Start: 2021-12-21 | End: 2022-01-10 | Stop reason: HOSPADM

## 2021-12-21 RX ORDER — IBUPROFEN 200 MG
24 TABLET ORAL
Status: DISCONTINUED | OUTPATIENT
Start: 2021-12-21 | End: 2022-01-10 | Stop reason: HOSPADM

## 2021-12-21 RX ORDER — INSULIN ASPART 100 [IU]/ML
0-5 INJECTION, SOLUTION INTRAVENOUS; SUBCUTANEOUS
Status: DISCONTINUED | OUTPATIENT
Start: 2021-12-21 | End: 2022-01-10 | Stop reason: HOSPADM

## 2021-12-21 RX ORDER — FUROSEMIDE 40 MG/1
40 TABLET ORAL DAILY
Status: DISCONTINUED | OUTPATIENT
Start: 2021-12-22 | End: 2022-01-01

## 2021-12-21 RX ORDER — PREGABALIN 50 MG/1
50 CAPSULE ORAL 3 TIMES DAILY
Status: DISCONTINUED | OUTPATIENT
Start: 2021-12-21 | End: 2022-01-07

## 2021-12-21 RX ORDER — IBUPROFEN 200 MG
16 TABLET ORAL
Status: DISCONTINUED | OUTPATIENT
Start: 2021-12-21 | End: 2022-01-10 | Stop reason: HOSPADM

## 2021-12-21 RX ORDER — ONDANSETRON 2 MG/ML
4 INJECTION INTRAMUSCULAR; INTRAVENOUS EVERY 8 HOURS PRN
Status: DISCONTINUED | OUTPATIENT
Start: 2021-12-21 | End: 2022-01-10 | Stop reason: HOSPADM

## 2021-12-21 RX ORDER — HYDRALAZINE HYDROCHLORIDE 20 MG/ML
10 INJECTION INTRAMUSCULAR; INTRAVENOUS EVERY 8 HOURS PRN
Status: DISCONTINUED | OUTPATIENT
Start: 2021-12-21 | End: 2022-01-10 | Stop reason: HOSPADM

## 2021-12-21 RX ORDER — MORPHINE SULFATE 4 MG/ML
4 INJECTION, SOLUTION INTRAMUSCULAR; INTRAVENOUS
Status: COMPLETED | OUTPATIENT
Start: 2021-12-21 | End: 2021-12-21

## 2021-12-21 RX ORDER — CEFEPIME HYDROCHLORIDE 1 G/50ML
1 INJECTION, SOLUTION INTRAVENOUS
Status: DISCONTINUED | OUTPATIENT
Start: 2021-12-21 | End: 2021-12-29

## 2021-12-21 RX ORDER — ALLOPURINOL 100 MG/1
100 TABLET ORAL DAILY
Status: DISCONTINUED | OUTPATIENT
Start: 2021-12-22 | End: 2022-01-10 | Stop reason: HOSPADM

## 2021-12-21 RX ORDER — ENOXAPARIN SODIUM 100 MG/ML
40 INJECTION SUBCUTANEOUS EVERY 24 HOURS
Status: DISCONTINUED | OUTPATIENT
Start: 2021-12-21 | End: 2021-12-21

## 2021-12-21 RX ORDER — NALOXONE HCL 0.4 MG/ML
0.02 VIAL (ML) INJECTION
Status: DISCONTINUED | OUTPATIENT
Start: 2021-12-21 | End: 2022-01-10 | Stop reason: HOSPADM

## 2021-12-21 RX ORDER — METRONIDAZOLE 500 MG/1
500 TABLET ORAL EVERY 8 HOURS
Status: DISCONTINUED | OUTPATIENT
Start: 2021-12-21 | End: 2021-12-30

## 2021-12-21 RX ORDER — ATORVASTATIN CALCIUM 40 MG/1
80 TABLET, FILM COATED ORAL NIGHTLY
Status: DISCONTINUED | OUTPATIENT
Start: 2021-12-21 | End: 2022-01-10 | Stop reason: HOSPADM

## 2021-12-21 RX ORDER — OXYCODONE AND ACETAMINOPHEN 7.5; 325 MG/1; MG/1
1 TABLET ORAL EVERY 6 HOURS PRN
Status: DISCONTINUED | OUTPATIENT
Start: 2021-12-21 | End: 2021-12-26

## 2021-12-21 RX ORDER — ACETAMINOPHEN 325 MG/1
650 TABLET ORAL EVERY 4 HOURS PRN
Status: DISCONTINUED | OUTPATIENT
Start: 2021-12-21 | End: 2022-01-10 | Stop reason: HOSPADM

## 2021-12-21 RX ORDER — MIRTAZAPINE 7.5 MG/1
7.5 TABLET, FILM COATED ORAL NIGHTLY
Status: DISCONTINUED | OUTPATIENT
Start: 2021-12-21 | End: 2021-12-30

## 2021-12-21 RX ORDER — ESCITALOPRAM OXALATE 10 MG/1
10 TABLET ORAL DAILY
Status: DISCONTINUED | OUTPATIENT
Start: 2021-12-22 | End: 2021-12-30

## 2021-12-21 RX ADMIN — VANCOMYCIN HYDROCHLORIDE 2000 MG: 10 INJECTION, POWDER, LYOPHILIZED, FOR SOLUTION INTRAVENOUS at 11:12

## 2021-12-21 RX ADMIN — MORPHINE SULFATE 4 MG: 4 INJECTION INTRAVENOUS at 05:12

## 2021-12-21 RX ADMIN — CEFEPIME HYDROCHLORIDE 1 G: 1 INJECTION, SOLUTION INTRAVENOUS at 10:12

## 2021-12-21 RX ADMIN — METRONIDAZOLE 500 MG: 500 TABLET ORAL at 09:12

## 2021-12-21 RX ADMIN — MELATONIN TAB 3 MG 6 MG: 3 TAB at 09:12

## 2021-12-21 RX ADMIN — ATORVASTATIN CALCIUM 80 MG: 40 TABLET, FILM COATED ORAL at 09:12

## 2021-12-21 RX ADMIN — OXYCODONE AND ACETAMINOPHEN 1 TABLET: 7.5; 325 TABLET ORAL at 10:12

## 2021-12-21 RX ADMIN — MIRTAZAPINE 7.5 MG: 7.5 TABLET ORAL at 10:12

## 2021-12-21 RX ADMIN — PREGABALIN 50 MG: 50 CAPSULE ORAL at 09:12

## 2021-12-21 NOTE — Clinical Note
Is this patient a high probability for COVID-19?: No   Diagnosis: Pain [277967]   Future Attending Provider: BARBARA SOMERS [[id number]]   Admitting Provider:: BARBARA SOMERS [[id number]]

## 2021-12-22 ENCOUNTER — PATIENT OUTREACH (OUTPATIENT)
Dept: ADMINISTRATIVE | Facility: OTHER | Age: 56
End: 2021-12-22
Payer: MEDICARE

## 2021-12-22 ENCOUNTER — TELEPHONE (OUTPATIENT)
Dept: NEUROLOGY | Facility: CLINIC | Age: 56
End: 2021-12-22
Payer: MEDICARE

## 2021-12-22 PROBLEM — M86.9 OSTEOMYELITIS OF RIGHT FOOT: Status: ACTIVE | Noted: 2021-12-22

## 2021-12-22 LAB
ABO + RH BLD: NORMAL
BASOPHILS # BLD AUTO: 0.08 K/UL (ref 0–0.2)
BASOPHILS NFR BLD: 0.6 % (ref 0–1.9)
BLD GP AB SCN CELLS X3 SERPL QL: NORMAL
DIFFERENTIAL METHOD: ABNORMAL
EOSINOPHIL # BLD AUTO: 0.2 K/UL (ref 0–0.5)
EOSINOPHIL NFR BLD: 1.2 % (ref 0–8)
ERYTHROCYTE [DISTWIDTH] IN BLOOD BY AUTOMATED COUNT: 14 % (ref 11.5–14.5)
HCT VFR BLD AUTO: 25.8 % (ref 37–48.5)
HGB BLD-MCNC: 7.7 G/DL (ref 12–16)
IMM GRANULOCYTES # BLD AUTO: 0.09 K/UL (ref 0–0.04)
IMM GRANULOCYTES NFR BLD AUTO: 0.7 % (ref 0–0.5)
LYMPHOCYTES # BLD AUTO: 1.5 K/UL (ref 1–4.8)
LYMPHOCYTES NFR BLD: 10.8 % (ref 18–48)
MCH RBC QN AUTO: 30.3 PG (ref 27–31)
MCHC RBC AUTO-ENTMCNC: 29.8 G/DL (ref 32–36)
MCV RBC AUTO: 102 FL (ref 82–98)
MONOCYTES # BLD AUTO: 1.1 K/UL (ref 0.3–1)
MONOCYTES NFR BLD: 8 % (ref 4–15)
NEUTROPHILS # BLD AUTO: 10.8 K/UL (ref 1.8–7.7)
NEUTROPHILS NFR BLD: 78.7 % (ref 38–73)
NRBC BLD-RTO: 0 /100 WBC
PLATELET # BLD AUTO: 611 K/UL (ref 150–450)
PMV BLD AUTO: 10.6 FL (ref 9.2–12.9)
POCT GLUCOSE: 140 MG/DL (ref 70–110)
POCT GLUCOSE: 165 MG/DL (ref 70–110)
POCT GLUCOSE: 201 MG/DL (ref 70–110)
POCT GLUCOSE: 256 MG/DL (ref 70–110)
RBC # BLD AUTO: 2.54 M/UL (ref 4–5.4)
WBC # BLD AUTO: 13.76 K/UL (ref 3.9–12.7)

## 2021-12-22 PROCEDURE — 99223 PR INITIAL HOSPITAL CARE,LEVL III: ICD-10-PCS | Mod: ,,, | Performed by: NURSE PRACTITIONER

## 2021-12-22 PROCEDURE — 63600175 PHARM REV CODE 636 W HCPCS: Performed by: INTERNAL MEDICINE

## 2021-12-22 PROCEDURE — 99223 1ST HOSP IP/OBS HIGH 75: CPT | Mod: ,,, | Performed by: NURSE PRACTITIONER

## 2021-12-22 PROCEDURE — 86920 COMPATIBILITY TEST SPIN: CPT | Performed by: INTERNAL MEDICINE

## 2021-12-22 PROCEDURE — C9399 UNCLASSIFIED DRUGS OR BIOLOG: HCPCS | Performed by: NURSE PRACTITIONER

## 2021-12-22 PROCEDURE — 11000001 HC ACUTE MED/SURG PRIVATE ROOM

## 2021-12-22 PROCEDURE — 99223 PR INITIAL HOSPITAL CARE,LEVL III: ICD-10-PCS | Mod: ,,, | Performed by: STUDENT IN AN ORGANIZED HEALTH CARE EDUCATION/TRAINING PROGRAM

## 2021-12-22 PROCEDURE — 63600175 PHARM REV CODE 636 W HCPCS: Performed by: NURSE PRACTITIONER

## 2021-12-22 PROCEDURE — 25000003 PHARM REV CODE 250: Performed by: NURSE PRACTITIONER

## 2021-12-22 PROCEDURE — 25000003 PHARM REV CODE 250: Performed by: INTERNAL MEDICINE

## 2021-12-22 PROCEDURE — 36415 COLL VENOUS BLD VENIPUNCTURE: CPT | Performed by: INTERNAL MEDICINE

## 2021-12-22 PROCEDURE — 85025 COMPLETE CBC W/AUTO DIFF WBC: CPT | Performed by: INTERNAL MEDICINE

## 2021-12-22 PROCEDURE — 99223 1ST HOSP IP/OBS HIGH 75: CPT | Mod: ,,, | Performed by: STUDENT IN AN ORGANIZED HEALTH CARE EDUCATION/TRAINING PROGRAM

## 2021-12-22 PROCEDURE — 86850 RBC ANTIBODY SCREEN: CPT | Performed by: INTERNAL MEDICINE

## 2021-12-22 PROCEDURE — 27000207 HC ISOLATION

## 2021-12-22 RX ADMIN — PREGABALIN 50 MG: 50 CAPSULE ORAL at 01:12

## 2021-12-22 RX ADMIN — FUROSEMIDE 40 MG: 40 TABLET ORAL at 01:12

## 2021-12-22 RX ADMIN — ATORVASTATIN CALCIUM 80 MG: 40 TABLET, FILM COATED ORAL at 08:12

## 2021-12-22 RX ADMIN — INSULIN ASPART 1 UNITS: 100 INJECTION, SOLUTION INTRAVENOUS; SUBCUTANEOUS at 08:12

## 2021-12-22 RX ADMIN — METRONIDAZOLE 500 MG: 500 TABLET ORAL at 01:12

## 2021-12-22 RX ADMIN — METRONIDAZOLE 500 MG: 500 TABLET ORAL at 09:12

## 2021-12-22 RX ADMIN — INSULIN DETEMIR 5 UNITS: 100 INJECTION, SOLUTION SUBCUTANEOUS at 08:12

## 2021-12-22 RX ADMIN — MELATONIN TAB 3 MG 6 MG: 3 TAB at 08:12

## 2021-12-22 RX ADMIN — AMLODIPINE BESYLATE 5 MG: 5 TABLET ORAL at 01:12

## 2021-12-22 RX ADMIN — OXYCODONE AND ACETAMINOPHEN 1 TABLET: 7.5; 325 TABLET ORAL at 05:12

## 2021-12-22 RX ADMIN — VANCOMYCIN HYDROCHLORIDE 1250 MG: 1.25 INJECTION, POWDER, LYOPHILIZED, FOR SOLUTION INTRAVENOUS at 10:12

## 2021-12-22 RX ADMIN — OXYCODONE AND ACETAMINOPHEN 1 TABLET: 7.5; 325 TABLET ORAL at 01:12

## 2021-12-22 RX ADMIN — CEFEPIME HYDROCHLORIDE 1 G: 1 INJECTION, SOLUTION INTRAVENOUS at 01:12

## 2021-12-22 RX ADMIN — OXYCODONE AND ACETAMINOPHEN 1 TABLET: 7.5; 325 TABLET ORAL at 08:12

## 2021-12-22 RX ADMIN — MIRTAZAPINE 7.5 MG: 7.5 TABLET ORAL at 08:12

## 2021-12-22 RX ADMIN — ALLOPURINOL 100 MG: 100 TABLET ORAL at 01:12

## 2021-12-22 RX ADMIN — METRONIDAZOLE 500 MG: 500 TABLET ORAL at 05:12

## 2021-12-22 RX ADMIN — ESCITALOPRAM OXALATE 10 MG: 10 TABLET ORAL at 01:12

## 2021-12-22 RX ADMIN — PREGABALIN 50 MG: 50 CAPSULE ORAL at 08:12

## 2021-12-22 RX ADMIN — CEFEPIME HYDROCHLORIDE 1 G: 1 INJECTION, SOLUTION INTRAVENOUS at 08:12

## 2021-12-22 NOTE — PROGRESS NOTES
Pharmacokinetic Initial Assessment: IV Vancomycin    Assessment/Plan:    Initiate intravenous vancomycin with loading dose of 2000 mg once followed by a maintenance dose of vancomycin 1250mg IV every 24 hours  Desired empiric serum trough concentration is 15 to 20 mcg/mL  Draw vancomycin trough level 60 min prior to third dose on 12/22 at approximately 2230  Pharmacy will continue to follow and monitor vancomycin.      Please contact pharmacy at extension [phone] with any questions regarding this assessment.     Thank you for the consult,   Earnestine Olivares       Patient brief summary:  Suyapa Connelly is a 56 y.o. female initiated on antimicrobial therapy with IV Vancomycin for treatment of suspected bone/joint infection    Drug Allergies:   Review of patient's allergies indicates:   Allergen Reactions    Ciprofloxacin Itching    Contrast media      Kidney injury    Iodine      Kidney injury       Actual Body Weight:   80.3 kg    Renal Function:   Estimated Creatinine Clearance: 41.6 mL/min (A) (based on SCr of 1.6 mg/dL (H)).,     Dialysis Method (if applicable):  N/A    CBC (last 72 hours):  Recent Labs   Lab Result Units 12/21/21  1702 12/22/21  0809   WBC K/uL 15.29* 13.76*   Hemoglobin g/dL 7.4* 7.7*   Hematocrit % 24.2* 25.8*   Platelets K/uL 787* 611*   Gran % % 75.6* 78.7*   Lymph % % 13.8* 10.8*   Mono % % 7.8 8.0   Eosinophil % % 1.4 1.2   Basophil % % 0.5 0.6   Differential Method  Automated Automated       Metabolic Panel (last 72 hours):  Recent Labs   Lab Result Units 12/21/21  1702 12/21/21  2204   Sodium mmol/L 140  --    Potassium mmol/L 4.0  --    Chloride mmol/L 105  --    CO2 mmol/L 24  --    Glucose mg/dL 171*  --    Glucose, UA   --  1+*   BUN mg/dL 33*  --    Creatinine mg/dL 1.6*  --    Albumin g/dL 1.6*  --    Total Bilirubin mg/dL 0.2  --    Alkaline Phosphatase U/L 132  --    AST U/L 12  --    ALT U/L 7*  --    Magnesium mg/dL 2.1  --        Drug levels (last 3 results):  No results for  input(s): VANCOMYCINRA, VANCOMYCINPE, VANCOMYCINTR in the last 72 hours.    Microbiologic Results:  Microbiology Results (last 7 days)       Procedure Component Value Units Date/Time    Urine culture [[id number]] Collected: 12/21/21 2204    Order Status: No result Specimen: Urine Updated: 12/21/21 2240

## 2021-12-22 NOTE — ASSESSMENT & PLAN NOTE
- H&H this AM  7.7/25.8 and 7.7/24.2 upon presentation   - Hgb in 10/2021 11-12 but baseline appears to be 6-8/25-28  - evaluation for source of anemia; no GI scope per chart review; recommend stool for OCB; ideally would like antiplatelet therapy given PAD/CLI but given extent of anemia will hold

## 2021-12-22 NOTE — ASSESSMENT & PLAN NOTE
- history of PAF  - no BB listed on home med ? Reason; prescribed Eliquis with reports of noncompliance  - hold Eliquis due to anemia

## 2021-12-22 NOTE — ASSESSMENT & PLAN NOTE
- Had intervention to RLE on 10/22/21  - Persistent non-healing diabetic right foot wounds   - Arterial US showing stenosis   - Cardiology following  - NPO past midnight, allow diet today  - Had not been taking eliquis, cont to hold for possible intervention

## 2021-12-22 NOTE — ASSESSMENT & PLAN NOTE
-Patient examined today with dry necrotic changes extending from digits to midfoot and with necrotic eschar to heel. Ischemic appearing ulcer to lateral leg.   -Imaging studies reviewed   -Discussed surgical options with patient and her . Patient and her  elect for BKA at this time due to extensive necrosis and hsitory of poor healing. General surgery consult placed  -Antibiotics per hospital medicine  -Recommend offloading heel and wounds while in bed. Nursing orders in for wound care  -Podiatry will sign off at this time.

## 2021-12-22 NOTE — ASSESSMENT & PLAN NOTE
- Non-healing wounds noted with purulent drainage to heal  - Gangrenous changes to toes, exposed bone noted  - Cont vanc, cefepime, flagyl  - MRI of right foot confirms osteo  - Podiatry assessed, consutled gen sx for BKA  - ID consulted  - NPO past midnight for possible intervention  - Hold eliquis  - Percocet PRN pain

## 2021-12-22 NOTE — ASSESSMENT & PLAN NOTE
Hgb drop from 13 to 7 over 2 month period  Type and screen  Hold plavix and eliquis (had not been taking)  Denies acute blood loss  Check FOBT

## 2021-12-22 NOTE — CONSULTS
[place] - Telemetry  Cardiology  Consult Note    Patient Name: Suyapa Connelly  MRN: [id number]  Admission Date: 12/21/2021  Hospital Length of Stay: 1 days  Code Status: Full Code   Attending Provider: Obinna Marr DO   Consulting Provider: SAMMY De Santiago ANP  Primary Care Physician: Magen Christensen MD  Principal Problem:Osteomyelitis of right foot    Patient information was obtained from patient, past medical records and ER records.     Cardiology-[facility]  Consult performed by: SAMMY Jimenez, FERNY  Consult ordered by: Petra De Anda NP  Reason for consult: CLI         Subjective:     Chief Complaint:  Right foot pain      HPI:   55yo female with PAD s/p RSFA and pop intervention 10/2021. HTN, HL, DMII, PAF on Eliquis, chronic diastolic heart failure, CKD stage III, cellulitis, right foot osteomyelitis, CAROLIN, MDD, vitamin D deficiency and diabetic wound to right foot who presented to the ER with complaints of increased pain to right foot for the past month. She underwent RLE revascularization by Dr. Arenas in October for non healing wound. Unfortunately she has not followed up since intervention with cardiology or wound care. The dressing to her right foot was removed with notation of purulent foul smelling drainage. She reports no frequent changing of dressing with last change during last MD visit. She reports compliance with her medications to me on rounds however per chart review notation of admittance of non compliance with medications and no Eliquis for the past month. She reports weakness to the right leg and inability to stand since her left AKA in 5/2021. She has been using her 's Percocet for pain relief. She underwent MRI with evidence of osteomyelitis and arterial ultrasound with occlusion of RSFA and pop.  .2 and was started on IV Vanc and Cefepim and was admitted to [facility] for further evaluation      Hospital Course:    12/22/2021 per  HPI. Cardiology consulted for CLI   Past Medical History:   Diagnosis Date    Anxiety     Chronic pain syndrome     CKD (chronic kidney disease), stage III     Depression     Diabetes mellitus     type 2    Diabetes mellitus, type 2     GERD (gastroesophageal reflux disease)     Hyperemesis 3/23/2021    Hyperlipemia     Hypertension     Hypokalemia 3/23/2021    Myocardial infarction 2010    minor-caused by stress per pt.    Osteomyelitis     Osteomyelitis of left foot 4/30/2021    PVD (peripheral vascular disease)     Vaginal delivery     x1       Past Surgical History:   Procedure Laterality Date    ABOVE-KNEE AMPUTATION Left 5/18/2021    Procedure: AMPUTATION, ABOVE KNEE;  Surgeon: Teddy Huber MD;  Location: [facility] OR [address] [place] [address];  Service: Vascular;  Laterality: Left;    Angiogram - Right Extremity Right 7/9/15    angiogram-left leg  10/6/15    ANGIOGRAPHY OF LOWER EXTREMITY Left 4/29/2021    Procedure: ANGIOGRAM, LOWER EXTREMITY;  Surgeon: Teddy Huber MD;  Location: [facility] OR [address] [place] [address];  Service: Vascular;  Laterality: Left;    CATHETERIZATION OF BOTH LEFT AND RIGHT HEART N/A 12/18/2019    Procedure: CATHETERIZATION, HEART, BOTH LEFT AND RIGHT;  Surgeon: Que Fernando III, MD;  Location: [facility] CATH LAB;  Service: Cardiology;  Laterality: N/A;    CORONARY ANGIOGRAPHY N/A 12/18/2019    Procedure: ANGIOGRAM, CORONARY ARTERY;  Surgeon: Que Fernando III, MD;  Location: [facility] CATH LAB;  Service: Cardiology;  Laterality: N/A;    CORONARY ANGIOGRAPHY INCLUDING BYPASS GRAFTS WITH CATHETERIZATION OF LEFT HEART N/A 7/28/2020    Procedure: ANGIOGRAM, CORONARY, INCLUDING BYPASS GRAFT, WITH LEFT HEART CATHETERIZATION, 9 am;  Surgeon: Rachel Easley MD;  Location: [facility] CATH LAB;  Service: Cardiology;  Laterality: N/A;    CORONARY ARTERY BYPASS GRAFT (CABG) N/A 1/14/2020    Procedure: CORONARY ARTERY BYPASS GRAFT (CABG) x 1     Off Pump;  Surgeon: Huang Altamirano MD;  Location: [facility]  OR 2ND FLR;  Service: Cardiovascular;  Laterality: N/A;    CREATION OF FEMORAL-TIBIAL ARTERY BYPASS Left 4/29/2021    Procedure: CREATION, BYPASS, ARTERIAL, FEMORAL TO ANTERIOR TIBIAL;  Surgeon: Teddy Huber MD;  Location: [facility] OR 2ND FLR;  Service: Vascular;  Laterality: Left;    CREATION OF FEMOROPOPLITEAL ARTERIAL BYPASS USING GRAFT Left 8/18/2020    Procedure: CREATION, BYPASS, ARTERIAL, FEMORAL TO POPLITEAL, USING GRAFT, LEFT LOWER EXTREMITY;  Surgeon: Teddy Huber MD;  Location: [facility] OR;  Service: Vascular;  Laterality: Left;  REQUEST 7:15 A.M. START----COVID NEGATIVE ON 8/17  1ST CASE STARTE PER LEANA ON 8/7/2020 @ 942AM-  RN PREOP 8/12/2020   T/S-----CLEARED BY CARDS-------PENDING INSURANCE    DEBRIDEMENT OF FOOT Left 9/8/2020    Procedure: DEBRIDEMENT, FOOT;  Surgeon: Rosio Mayes DPM;  Location: [facility] OR;  Service: Podiatry;  Laterality: Left;  request neoxx .   RN Pre Op 9-4-2020, Covid negative on 9/5/20. C A    DEBRIDEMENT OF FOOT  3/4/2021    Procedure: DEBRIDEMENT, FOOT;  Surgeon: Teddy Huber MD;  Location: [facility] OR;  Service: Vascular;;    DEBRIDEMENT OF FOOT Left 3/9/2021    Procedure: DEBRIDEMENT, FOOT, bone biopsy;  Surgeon: Rosio Mayes DPM;  Location: [facility] OR;  Service: Podiatry;  Laterality: Left;  Request neoxx---COVID IN AM  REQUESTING NOON START  RN Phone Pre op.On Blood thinners Plavix and Eliquis.  Covid am of surgery. C A    DEBRIDEMENT OF FOOT Left 5/4/2021    Procedure: DEBRIDEMENT, FOOT;  Surgeon: Farooq Morley DPM;  Location: [facility] OR 2ND FLR;  Service: Podiatry;  Laterality: Left;    INSERTION OF TUNNELED CENTRAL VENOUS HEMODIALYSIS CATHETER N/A 1/27/2020    Procedure: Insertion, Catheter, Central Venous, Hemodialysis;  Surgeon: ESTEBAN Gomez III, MD;  Location: [facility] CATH LAB;  Service: Peripheral Vascular;  Laterality: N/A;    PERCUTANEOUS TRANSLUMINAL ANGIOPLASTY N/A 3/4/2021    Procedure: PTA (ANGIOPLASTY, PERCUTANEOUS,  TRANSLUMINAL);  Surgeon: Teddy Huber MD;  Location: [facility] OR;  Service: Vascular;  Laterality: N/A;    REMOVAL OF ARTERIOVENOUS GRAFT Left 5/27/2021    Procedure: REMOVAL, GRAFT, LEFT LOWER EXTREMITY, WOUND EXPLORATION;  Surgeon: Teddy Huber MD;  Location: [facility] OR 2ND FLR;  Service: Vascular;  Laterality: Left;    REMOVAL OF NAIL OF DIGIT Left 3/9/2021    Procedure: REMOVAL, NAIL, DIGIT;  Surgeon: Rosio Mayes DPM;  Location: [facility] OR;  Service: Podiatry;  Laterality: Left;    THROMBECTOMY Left 3/4/2021    Procedure: THROMBECTOMY, LEFT LOWER EXTREMITY BYPASS GRAFT, ANGIOGRAM, POSSIBLE INTERVENTION, POSSIBLE LEFT LOWER EXTREMITY BYPASS;  Surgeon: Teddy Huber MD;  Location: [facility] OR;  Service: Vascular;  Laterality: Left;    THROMBECTOMY Left 4/29/2021    Procedure: GRAFT THROMBECTOMY, LEFT LOWER EXTREMITY;  Surgeon: Teddy Huber MD;  Location: [facility] OR 2ND FLR;  Service: Vascular;  Laterality: Left;  14.5 min  1179.85 mGy  341.01 Gycm2  240 ml dye    THROMBECTOMY  10/22/2021    Procedure: THROMBECTOMY;  Surgeon: Saad Arenas MD;  Location: [facility] CATH LAB/EP;  Service: Cardiology;;       Review of patient's allergies indicates:   Allergen Reactions    Ciprofloxacin Itching    Contrast media      Kidney injury    Iodine      Kidney injury       Current Facility-Administered Medications on File Prior to Encounter   Medication    lactated ringers infusion     Current Outpatient Medications on File Prior to Encounter   Medication Sig    acetaminophen (TYLENOL) 500 MG tablet Take 2 tablets (1,000 mg total) by mouth every 8 (eight) hours as needed for Pain.    allopurinoL (ZYLOPRIM) 100 MG tablet Take 1 tablet (100 mg total) by mouth once daily.    atorvastatin (LIPITOR) 80 MG tablet Take 1 tablet (80 mg total) by mouth every evening.    clopidogreL (PLAVIX) 75 mg tablet Take 1 tablet (75 mg total) by mouth once daily.    EScitalopram oxalate (LEXAPRO) 10 MG tablet Take 1  tablet (10 mg total) by mouth once daily.    furosemide (LASIX) 40 MG tablet Take 40 mg by mouth 2 (two) times daily.    insulin aspart U-100 (NOVOLOG FLEXPEN U-100 INSULIN) 100 unit/mL (3 mL) InPn pen Inject 5 Units into the skin 3 (three) times daily with meals. If not eating, ok to hold    insulin detemir U-100 (LEVEMIR FLEXTOUCH) 100 unit/mL (3 mL) SubQ InPn pen Inject 8 Units into the skin once daily. (Patient taking differently: Inject 10 Units into the skin every evening.)    pregabalin (LYRICA) 50 MG capsule Take 1 capsule (50 mg total) by mouth 3 (three) times daily.    sodium bicarbonate 650 MG tablet Take 1 tablet (650 mg total) by mouth 2 (two) times daily.    alogliptin (NESINA) 25 mg Tab Take 1 tablet (25 mg) by mouth once daily.    apixaban (ELIQUIS) 5 mg Tab Take 1 tablet (5 mg total) by mouth 2 (two) times daily.    blood-glucose meter,continuous (DEXCOM G6 ) Misc Use to check blood sugars 4 times/day    blood-glucose sensor (DEXCOM G6 SENSOR) Radha Apply one sensor to skin every 10 days    blood-glucose transmitter (DEXCOM G6 TRANSMITTER) Radha Replace transmitter every 3 months to use with dexcom sensor    collagenase (SANTYL) ointment Apply topically once daily. Nursing to cleanse L groin and 2 areas to left AKA incision site with sterile normal saline,  then apply a thin layer of Santyl ointment to affected area daily. Cover with bordered foam island mepore dressing. (Patient not taking: Reported on 12/21/2021)    magnesium oxide (MAG-OX) 400 mg (241.3 mg magnesium) tablet Take 1 tablet (400 mg total) by mouth 2 (two) times daily. (Patient not taking: No sig reported)    melatonin (MELATIN) 3 mg tablet Take 2 tablets (6 mg total) by mouth nightly as needed for Insomnia. (Patient not taking: No sig reported)    mirtazapine (REMERON) 7.5 MG Tab Take 1 tablet (7.5 mg total) by mouth every evening.    nystatin (MYCOSTATIN) powder Apply topically 2 (two) times daily. (Patient not  taking: Reported on 12/21/2021)    [DISCONTINUED] lancing device Misc 1 Device by Misc.(Non-Drug; Combo Route) route 2 (two) times daily with meals.     Family History     Problem Relation (Age of Onset)    Diabetes Mother, Father, Paternal Grandmother    Heart disease Maternal Grandmother    No Known Problems Maternal Grandfather, Paternal Grandfather        Tobacco Use    Smoking status: Former Smoker    Smokeless tobacco: Never Used   Substance and Sexual Activity    Alcohol use: No    Drug use: Yes     Types: Marijuana     Comment: occassional    Sexual activity: Yes     Partners: Male     Review of Systems   Constitutional: Negative for chills, decreased appetite, diaphoresis, fever, malaise/fatigue, weight gain and weight loss.   Cardiovascular: Negative for chest pain, claudication, dyspnea on exertion, irregular heartbeat, leg swelling, near-syncope, orthopnea, palpitations and paroxysmal nocturnal dyspnea.   Respiratory: Negative for cough, shortness of breath, snoring, sputum production and wheezing.    Endocrine: Negative for cold intolerance, heat intolerance, polydipsia, polyphagia and polyuria.   Skin: Positive for poor wound healing. Negative for color change, dry skin, itching and nail changes.   Musculoskeletal: Negative for back pain, gout, joint pain and joint swelling.   Gastrointestinal: Negative for bloating, abdominal pain, constipation, diarrhea, hematemesis, hematochezia, melena, nausea and vomiting.   Genitourinary: Negative for dysuria, hematuria and nocturia.   Neurological: Negative for dizziness, headaches, light-headedness, numbness, paresthesias and weakness.   Psychiatric/Behavioral: Negative for altered mental status, depression and memory loss.     Objective:     Vital Signs (Most Recent):  Temp: 97.6 °F (36.4 °C) (12/22/21 1055)  Pulse: 95 (12/22/21 1147)  Resp: 18 (12/22/21 1055)  BP: 139/66 (12/22/21 1055)  SpO2: 100 % (12/22/21 1055) Vital Signs (24h Range):  Temp:  [97  °F (36.1 °C)-98.3 °F (36.8 °C)] 97.6 °F (36.4 °C)  Pulse:  [] 95  Resp:  [18-21] 18  SpO2:  [95 %-100 %] 100 %  BP: (124-192)/(62-95) 139/66     Weight: 80.3 kg (177 lb 0.5 oz)  Body mass index is 29.46 kg/m².    SpO2: 100 %  O2 Device (Oxygen Therapy): room air      Intake/Output Summary (Last 24 hours) at 12/22/2021 1219  Last data filed at 12/22/2021 0900  Gross per 24 hour   Intake 250 ml   Output --   Net 250 ml       Lines/Drains/Airways     Peripheral Intravenous Line                 Peripheral IV - Single Lumen 12/21/21 1700 20 G Right Other <1 day                Physical Exam  Constitutional:       General: She is not in acute distress.     Appearance: She is well-developed and well-nourished.   Cardiovascular:      Rate and Rhythm: Normal rate and regular rhythm.      Heart sounds: No murmur heard.  No gallop.    Pulmonary:      Effort: Pulmonary effort is normal. No respiratory distress.      Breath sounds: Normal breath sounds. No wheezing.   Abdominal:      General: Bowel sounds are normal. There is no distension.      Palpations: Abdomen is soft.      Tenderness: There is no abdominal tenderness.   Skin:     General: Skin is warm and dry.   Neurological:      Mental Status: She is alert and oriented to person, place, and time.         Significant Labs:   BMP:   Recent Labs   Lab 12/21/21  1702   *      K 4.0      CO2 24   BUN 33*   CREATININE 1.6*   CALCIUM 8.1*   MG 2.1    and CBC   Recent Labs   Lab 12/21/21  1702 12/21/21  1702 12/22/21  0809   WBC 15.29*  --  13.76*   HGB 7.4*  --  7.7*   HCT 24.2*   < > 25.8*   *  --  611*    < > = values in this interval not displayed.       Significant Imaging: Echocardiogram:   Transthoracic echo (TTE) complete ([facility] Only):   Results for orders placed or performed during the hospital encounter of 06/17/21   Echo Saline Bubble? No   Result Value Ref Range    BSA 2.07 m2    TDI SEPTAL 0.10 m/s    LV LATERAL E/E' RATIO 7.31 m/s     "LV SEPTAL E/E' RATIO 9.50 m/s    LA WIDTH 3.40 cm    TDI LATERAL 0.13 m/s    LVIDd 4.63 3.5 - 6.0 cm    IVS 0.92 0.6 - 1.1 cm    Posterior Wall 0.91 0.6 - 1.1 cm    LVIDs 3.47 2.1 - 4.0 cm    FS 25 28 - 44 %    LA volume 38.97 cm3    Sinus 2.64 cm    STJ 2.45 cm    Ascending aorta 3.13 cm    LV mass 142.32 g    LA size 3.42 cm    RVDD 3.34 cm    TAPSE 1.04 cm    RV S' 10.81 cm/s    Left Ventricle Relative Wall Thickness 0.39 cm    AV mean gradient 5 mmHg    AV valve area 2.05 cm2    AV Velocity Ratio 0.57     AV index (prosthetic) 0.72     MV valve area p 1/2 method 8.34 cm2    E/A ratio 1.76     Mean e' 0.12 m/s    E wave deceleration time 90.93 msec    MV "A" wave duration 6.57 msec    Pulm vein S/D ratio 1.12     LVOT diameter 1.91 cm    LVOT area 2.9 cm2    LVOT peak ty 0.87 m/s    LVOT peak VTI 19.98 cm    Ao peak ty 1.53 m/s    Ao VTI 27.88 cm    LVOT stroke volume 57.22 cm3    AV peak gradient 9 mmHg    E/E' ratio 8.26 m/s    MV Peak E Ty 0.95 m/s    TR Max Ty 2.63 m/s    MV stenosis pressure 1/2 time 26.37 ms    MV Peak A Ty 0.54 m/s    PV Peak S Ty 0.48 m/s    PV Peak D Ty 0.43 m/s    LV Systolic Volume 49.74 mL    LV Systolic Volume Index 24.9 mL/m2    LV Diastolic Volume 99.06 mL    LV Diastolic Volume Index 49.53 mL/m2    LA Volume Index 19.5 mL/m2    LV Mass Index 71 g/m2    RA Major Axis 4.25 cm    Left Atrium Minor Axis 3.85 cm    Left Atrium Major Axis 4.04 cm    Triscuspid Valve Regurgitation Peak Gradient 28 mmHg    LA Volume Index (Mod) 13.4 mL/m2    LA volume (mod) 26.75 cm3    RA Width 3.44 cm    Right Atrial Pressure (from IVC) 3 mmHg    EF 55 %    TV rest pulmonary artery pressure 31 mmHg    Narrative    · The estimated ejection fraction is 55%.  · The left ventricle is normal in size with normal systolic function.  · Normal left ventricular diastolic function.  · Normal right ventricular size with normal right ventricular systolic   function.  · Mild tricuspid regurgitation.  · The " estimated PA systolic pressure is 31 mmHg.  · Normal central venous pressure (3 mmHg).        Assessment and Plan:     * Osteomyelitis of right foot  - per MRI  - on IV abx  - ESR and CRP elevated 120/110.2  - Podiatry consulted     Stage 3 chronic kidney disease due to type 2 diabetes mellitus  - creatinine 1.6 upon admission; BMP pending today  - baseline creatinine 1.2-1.6    Chronic diastolic heart failure  - echo in June 2021 with normal LVEF  - no ADHF noted on exam     Chronic anemia  - H&H this AM  7.7/25.8 and 7.7/24.2 upon presentation   - Hgb in 10/2021 11-12 but baseline appears to be 6-8/25-28  - evaluation for source of anemia; no GI scope per chart review; recommend stool for OCB; ideally would like antiplatelet therapy given PAD/CLI but given extent of anemia will hold     Critical lower limb ischemia  - previous left BKA with CLI/non healing wound to right foot  - s/p extensive revascularization to RLE in 10/2021 with atherectomy to SFA and Pop with PTA with DCB; right AT  with DP reconstitution from peroneal   - prescribed Lipitor, Eliquis and Plavix with reports of noncompliance with medication and follow up  - presented to ER with worsening pain; osteo on xray with elevated CRP and ESR; arterial ultrasound with evidence of occlusion to SFA and popliteal consistent with restenosis  - recommend conservative medical management for now given anemia; Podiatry consult; anticipate need for toe amputation but will discuss with Podiatry      Paroxysmal atrial fibrillation  - history of PAF  - no BB listed on home med ? Reason; prescribed Eliquis with reports of noncompliance  - hold Eliquis due to anemia     Essential hypertension  - SBP 120s-140s  - on Norvasc and will continue         VTE Risk Mitigation (From admission, onward)         Ordered     Reason for No Pharmacological VTE Prophylaxis  Once        Question:  Reasons:  Answer:  Already adequately anticoagulated on oral Anticoagulants     12/21/21 1910     IP VTE HIGH RISK PATIENT  Once         12/21/21 1910                Thank you for your consult. I will follow-up with patient. Please contact us if you have any additional questions.    SAMMY De Santiago, ANP  Cardiology   [place] - Telemetry

## 2021-12-22 NOTE — ASSESSMENT & PLAN NOTE
NSR on telemetry  No EKG for review  Noncompliant with eliquis  Cont to hold for possible RLE intervention

## 2021-12-22 NOTE — PLAN OF CARE
SW did rounds on pt, provider at bedside. SW will return later today.       12/22/21 1419   Discharge Assessment   Assessment Type Discharge Planning Brief Assessment

## 2021-12-22 NOTE — PHARMACY MED REC
"  Admission Medication History     The home medication history was taken by Jazmín Armando CPhT.    Medication history obtained from,Patient verified    You may go to "Admission" then "Reconcile Home Medications" tabs to review and/or act upon these items.      The home medication list has been updated by the Pharmacy department.    Please read ALL comments highlighted in yellow.    Please address this information as you see fit.     Feel free to contact us if you have any questions or require assistance.        Jazmín Armando CPhT.  Ext [phone]              .          "

## 2021-12-22 NOTE — ASSESSMENT & PLAN NOTE
- previous left BKA with CLI/non healing wound to right foot  - s/p extensive revascularization to RLE in 10/2021 with atherectomy to SFA and Pop with PTA with DCB; right AT  with DP reconstitution from peroneal   - prescribed Lipitor, Eliquis and Plavix with reports of noncompliance with medication and follow up  - presented to ER with worsening pain; osteo on xray with elevated CRP and ESR; arterial ultrasound with evidence of occlusion to SFA and popliteal consistent with restenosis  - recommend conservative medical management for now given anemia; Podiatry consult; anticipate need for toe amputation but will discuss with Podiatry

## 2021-12-22 NOTE — ASSESSMENT & PLAN NOTE
Prior echo reviewed  Reports taking lasix daily over past week  Euvolemic on exam  Cont lasix daily

## 2021-12-22 NOTE — SUBJECTIVE & OBJECTIVE
Scheduled Meds:   allopurinoL  100 mg Oral Daily    amLODIPine  5 mg Oral Daily    atorvastatin  80 mg Oral QHS    ceFEPime (MAXIPIME) IVPB  1 g Intravenous Q12H    EScitalopram oxalate  10 mg Oral Daily    furosemide  40 mg Oral Daily    insulin detemir U-100  5 Units Subcutaneous QHS    metroNIDAZOLE  500 mg Oral Q8H    mirtazapine  7.5 mg Oral QHS    pregabalin  50 mg Oral TID    vancomycin (VANCOCIN) IVPB  1,250 mg Intravenous Q24H     Continuous Infusions:  PRN Meds:acetaminophen, dextrose 50%, dextrose 50%, glucagon (human recombinant), glucose, glucose, hydrALAZINE, insulin aspart U-100, melatonin, naloxone, ondansetron, oxyCODONE-acetaminophen, pneumoc 13-azam conj-dip cr(PF), sars-cov-2 (covid-19), sodium chloride 0.9%, Pharmacy to dose Vancomycin consult **AND** vancomycin - pharmacy to dose    Review of patient's allergies indicates:   Allergen Reactions    Ciprofloxacin Itching    Contrast media      Kidney injury    Iodine      Kidney injury        Past Medical History:   Diagnosis Date    Anxiety     Chronic pain syndrome     CKD (chronic kidney disease), stage III     Depression     Diabetes mellitus     type 2    Diabetes mellitus, type 2     GERD (gastroesophageal reflux disease)     Hyperemesis 3/23/2021    Hyperlipemia     Hypertension     Hypokalemia 3/23/2021    Myocardial infarction 2010    minor-caused by stress per pt.    Osteomyelitis     Osteomyelitis of left foot 4/30/2021    PVD (peripheral vascular disease)     Vaginal delivery     x1     Past Surgical History:   Procedure Laterality Date    ABOVE-KNEE AMPUTATION Left 5/18/2021    Procedure: AMPUTATION, ABOVE KNEE;  Surgeon: Teddy Huber MD;  Location: [facility] OR [address] [place] [address];  Service: Vascular;  Laterality: Left;    Angiogram - Right Extremity Right 7/9/15    angiogram-left leg  10/6/15    ANGIOGRAPHY OF LOWER EXTREMITY Left 4/29/2021    Procedure: ANGIOGRAM, LOWER EXTREMITY;  Surgeon: Teddy BLANCA  MD Cecile;  [facility];  Service: Vascular;  Laterality: Left;    CATHETERIZATION OF BOTH LEFT AND RIGHT HEART N/A 12/18/2019    Procedure: CATHETERIZATION, HEART, BOTH LEFT AND RIGHT;  Surgeon: Que Fernando III, MD;  Location: [facility] CATH LAB;  Service: Cardiology;  Laterality: N/A;    CORONARY ANGIOGRAPHY N/A 12/18/2019    Procedure: ANGIOGRAM, CORONARY ARTERY;  Surgeon: Que Fernando III, MD;  Location: [facility] CATH LAB;  Service: Cardiology;  Laterality: N/A;    CORONARY ANGIOGRAPHY INCLUDING BYPASS GRAFTS WITH CATHETERIZATION OF LEFT HEART N/A 7/28/2020    Procedure: ANGIOGRAM, CORONARY, INCLUDING BYPASS GRAFT, WITH LEFT HEART CATHETERIZATION, 9 am;  Surgeon: Rachel Easley MD;  Location: [facility] CATH LAB;  Service: Cardiology;  Laterality: N/A;    CORONARY ARTERY BYPASS GRAFT (CABG) N/A 1/14/2020    Procedure: CORONARY ARTERY BYPASS GRAFT (CABG) x 1     Off Pump;  Surgeon: Huang Altamirano MD;  [address]  Service: Cardiovascular;  Laterality: N/A;    CREATION OF FEMORAL-TIBIAL ARTERY BYPASS Left 4/29/2021    Procedure: CREATION, BYPASS, ARTERIAL, FEMORAL TO ANTERIOR TIBIAL;  Surgeon: Teddy Huber MD;  [facility];  Service: Vascular;  Laterality: Left;    CREATION OF FEMOROPOPLITEAL ARTERIAL BYPASS USING GRAFT Left 8/18/2020    Procedure: CREATION, BYPASS, ARTERIAL, FEMORAL TO POPLITEAL, USING GRAFT, LEFT LOWER EXTREMITY;  Surgeon: Teddy Huber MD;  Location: [facility];  Service: Vascular;  Laterality: Left;  REQUEST 7:15 A.M. START----COVID NEGATIVE ON 8/17  1ST CASE STARTKENYA PER LEANA ON 8/7/2020 @ 942AM-LO  RN PREOP 8/12/2020   T/S-----CLEARED BY CARDS-------PENDING INSURANCE    DEBRIDEMENT OF FOOT Left 9/8/2020    Procedure: DEBRIDEMENT, FOOT;  Surgeon: Rosio Mayes DPM;  Location: [facility];  Service: Podiatry;  Laterality: Left;  request neoxx .   RN Pre Op 9-4-2020, Covid negative on 9/5/20. C A    DEBRIDEMENT OF FOOT   3/4/2021    Procedure: DEBRIDEMENT, FOOT;  Surgeon: Teddy Huber MD;  Location: [facility] OR;  Service: Vascular;;    DEBRIDEMENT OF FOOT Left 3/9/2021    Procedure: DEBRIDEMENT, FOOT, bone biopsy;  Surgeon: Rosio Mayes DPM;  Location: [facility] OR;  Service: Podiatry;  Laterality: Left;  Request neoxx---COVID IN AM  REQUESTING NOON START  RN Phone Pre op.On Blood thinners Plavix and Eliquis.  Covid am of surgery. C A    DEBRIDEMENT OF FOOT Left 5/4/2021    Procedure: DEBRIDEMENT, FOOT;  Surgeon: Farooq Morley DPM;  [facility];  Service: Podiatry;  Laterality: Left;    INSERTION OF TUNNELED CENTRAL VENOUS HEMODIALYSIS CATHETER N/A 1/27/2020    Procedure: Insertion, Catheter, Central Venous, Hemodialysis;  Surgeon: ESTEBAN Gomez III, MD;  Location: [facility] CATH LAB;  Service: Peripheral Vascular;  Laterality: N/A;    PERCUTANEOUS TRANSLUMINAL ANGIOPLASTY N/A 3/4/2021    Procedure: PTA (ANGIOPLASTY, PERCUTANEOUS, TRANSLUMINAL);  Surgeon: Teddy Huber MD;  Location: [facility] OR;  Service: Vascular;  Laterality: N/A;    REMOVAL OF ARTERIOVENOUS GRAFT Left 5/27/2021    Procedure: REMOVAL, GRAFT, LEFT LOWER EXTREMITY, WOUND EXPLORATION;  Surgeon: Teddy Huber MD;  Location: [facility] OR 2ND FLR;  Service: Vascular;  Laterality: Left;    REMOVAL OF NAIL OF DIGIT Left 3/9/2021    Procedure: REMOVAL, NAIL, DIGIT;  Surgeon: Rosio Maeys DPM;  Location: [facility] OR;  Service: Podiatry;  Laterality: Left;    THROMBECTOMY Left 3/4/2021    Procedure: THROMBECTOMY, LEFT LOWER EXTREMITY BYPASS GRAFT, ANGIOGRAM, POSSIBLE INTERVENTION, POSSIBLE LEFT LOWER EXTREMITY BYPASS;  Surgeon: Teddy Huber MD;  Location: [facility] OR;  Service: Vascular;  Laterality: Left;    THROMBECTOMY Left 4/29/2021    Procedure: GRAFT THROMBECTOMY, LEFT LOWER EXTREMITY;  Surgeon: Teddy Huber MD;  Location: [facility] OR 2ND FLR;  Service: Vascular;  Laterality: Left;  14.5 min  1179.85 mGy  341.01 Gycm2  240 ml  dye    THROMBECTOMY  10/22/2021    Procedure: THROMBECTOMY;  Surgeon: Saad Arenas MD;  Location: [facility] CATH LAB/EP;  Service: Cardiology;;       Family History     Problem Relation (Age of Onset)    Diabetes Mother, Father, Paternal Grandmother    Heart disease Maternal Grandmother    No Known Problems Maternal Grandfather, Paternal Grandfather        Tobacco Use    Smoking status: Former Smoker    Smokeless tobacco: Never Used   Substance and Sexual Activity    Alcohol use: No    Drug use: Yes     Types: Marijuana     Comment: occassional    Sexual activity: Yes     Partners: Male     Review of Systems   Constitutional: Negative for chills, diaphoresis, fatigue and fever.   Respiratory: Negative for cough and shortness of breath.    Cardiovascular: Negative for chest pain and leg swelling.   Gastrointestinal: Negative for nausea and vomiting.   Musculoskeletal: Negative for arthralgias, back pain, gait problem and joint swelling.   Skin: Positive for color change and wound. Negative for pallor and rash.   Neurological: Positive for numbness. Negative for tremors, speech difficulty and weakness.   Psychiatric/Behavioral: Negative for agitation. The patient is not nervous/anxious.      Objective:     Vital Signs (Most Recent):  Temp: 97.6 °F (36.4 °C) (12/22/21 1055)  Pulse: 95 (12/22/21 1147)  Resp: 18 (12/22/21 1055)  BP: 139/66 (12/22/21 1055)  SpO2: 100 % (12/22/21 1055) Vital Signs (24h Range):  Temp:  [97 °F (36.1 °C)-98.3 °F (36.8 °C)] 97.6 °F (36.4 °C)  Pulse:  [] 95  Resp:  [18-21] 18  SpO2:  [95 %-100 %] 100 %  BP: (124-192)/(62-95) 139/66     Weight: 80.3 kg (177 lb 0.5 oz)  Body mass index is 29.46 kg/m².    Foot Exam    General  Orientation: alert and oriented to person, place, and time   Affect: appropriate       Right Foot/Ankle     Inspection and Palpation  Ecchymosis: none  Tenderness: bony tenderness   Swelling: none   Skin Exam: skin changes, abnormal color and ulcer; no drainage      Neurovascular  Dorsalis pedis: absent  Posterior tibial: absent  Saphenous nerve sensation: normal  Tibial nerve sensation: normal  Superficial peroneal nerve sensation: normal  Deep peroneal nerve sensation: normal  Sural nerve sensation: normal    Comments  Tenderness to right forefoot, heel and lateral leg ulcer.     Dry gangrenous changes extending from digits to tarsometatarsal joint of right foot. Right heel with lateral plantar necrotic eschar. Superficial ulcer along lateral leg extending to mid calf, appears ischemic. No drainage or malodor noted. No acute signs of infection.     Left Foot/Ankle      Comments  S/p left BKA      Laboratory:  A1C:   Recent Labs   Lab 12/03/21  1730   HGBA1C 9.6*     Blood Cultures: No results for input(s): LABBLOO in the last 48 hours.  CBC:   Recent Labs   Lab 12/22/21  0809   WBC 13.76*   RBC 2.54*   HGB 7.7*   HCT 25.8*   *   *   MCH 30.3   MCHC 29.8*     CMP:   Recent Labs   Lab 12/21/21  1702   *   CALCIUM 8.1*   ALBUMIN 1.6*   PROT 7.2      K 4.0   CO2 24      BUN 33*   CREATININE 1.6*   ALKPHOS 132   ALT 7*   AST 12   BILITOT 0.2     CRP:   Recent Labs   Lab 12/21/21  1702   .2*     ESR:   Recent Labs   Lab 12/21/21  1702   SEDRATE 120*     Wound Cultures:   Recent Labs   Lab 10/14/21  1108   LABAERO KLEBSIELLA PNEUMONIAE ESBL  Moderate  *       Diagnostic Results:  MRI: I have reviewed all pertinent results/findings within the past 24 hours.  forefoot MRI reviewed    Clinical Findings:  Dry gangrenous changes extending from digits to tarsometatarsal joint of right foot. Right heel with lateral plantar necrotic eschar. Superficial ulcer along lateral leg extending to mid calf, appears ischemic. No drainage or malodor noted. Diffuse edema to right foot. No clinical signs of abscess. No fluctuance or crepitus.

## 2021-12-22 NOTE — ASSESSMENT & PLAN NOTE
Had intervention to [facility] on 10/22/21  Persistent non-healing diabetic right foot wounds   Arterial US showing stenosis   Consult cardiology  NPO past midnight  Had not been taking eliquis, cont to hold for possible intervention

## 2021-12-22 NOTE — CONSULTS
[facility] Infectious Diseases Consult Note    Primary Attending Physician: Obinna Marr DO  Consultant Attending: Aubrey Storey MD  Consultant Fellow: Chavo Fowler MD    Assessment/Plan:     Suyapa Connelly is a 56 y.o. female PMH DM2, PVD, osteomyelitis, right AKA, CKD3, CAD s/p CABG, afib on eliquis.    12/22 admit for right foot pain x 1 month with right heel ulcer. Dressing to right heel was removed and had purulent foul smelling drainage noted as per documentation. Left AKA in May 2021. She also has a pressure wound to the lateral right lower leg. History of medication and appt non-compliance    Previous micro:  10/14/21 R-foot WCx Klebsiella pneumoniae ESBL  5/27 LLE WCx PsA and Proteus mirabilis pan sens, porphyromonas in anaerobic cx  5/18 UCx PsA   5/4 L-ankle bone cx Stenotrophomonas maltophilia  3/9 L-toe E. Faecalis non-VRE, steno, peptostreptococcus    Vanc/cefepime/metro at time of ID consultation.    R-foot osteomyelitis  -12/21 MRI suggestive of osteo in toes  -12/21 RLE US arteries PVD noted  -Cardio recs - conservative med mgmt  -Podiatry - BKA recs of RLE as per gen surgery  -Continue vanc and metro and cefepime  -BKA as per surgery  -Once BKA is done, then can dc abx  -ID will sign off, no need for ID outpt f/u    Call back with questions, concerns, or any changes    Thank you for allowing us to participate in the care of this patient. Please contact me if you have any questions regarding this consult.    Chavo Fowler MD  [facility], PGY-4  Cell: [phone]    Reason for Consult:     R-foot osteomyelitis    Subjective:      History of Present Illness:  Suyapa Connelly is a 56 y.o. female PMH DM2, PVD, osteomyelitis, right AKA, CKD3, CAD s/p CABG, afib on eliquis.    12/22 admit for right foot pain x 1 month with right heel ulcer. Dressing to right heel was removed and had purulent foul smelling drainage noted as per documentation. Did not make her follow-up appts. She  now has what appears to be dry gangrene to right toes with bone exposure. She has not been compliant with medications and states she had not taken eliquis in over a month. She states she has not been able to stand due to right leg weakness since her left AKA in May 2021. She also has a pressure wound to the lateral right lower leg.    Previous micro:  10/14/21 R-foot WCx Klebsiella pneumoniae ESBL  5/27 LLE WCx PsA and Proteus mirabilis pan sens, porphyromonas in anaerobic cx  5/18 UCx PsA   5/4 L-ankle bone cx Stenotrophomonas maltophilia  3/9 L-toe E. Faecalis non-VRE, steno, peptostreptococcus    Past Medical History:  Past Medical History:   Diagnosis Date    Anxiety     Chronic pain syndrome     CKD (chronic kidney disease), stage III     Depression     Diabetes mellitus     type 2    Diabetes mellitus, type 2     GERD (gastroesophageal reflux disease)     Hyperemesis 3/23/2021    Hyperlipemia     Hypertension     Hypokalemia 3/23/2021    Myocardial infarction 2010    minor-caused by stress per pt.    Osteomyelitis     Osteomyelitis of left foot 4/30/2021    PVD (peripheral vascular disease)     Vaginal delivery     x1       Past Surgical History:  Past Surgical History:   Procedure Laterality Date    ABOVE-KNEE AMPUTATION Left 5/18/2021    Procedure: AMPUTATION, ABOVE KNEE;  Surgeon: Teddy Huber MD;  Location: [facility] OR [address] [place] [address];  Service: Vascular;  Laterality: Left;    Angiogram - Right Extremity Right 7/9/15    angiogram-left leg  10/6/15    ANGIOGRAPHY OF LOWER EXTREMITY Left 4/29/2021    Procedure: ANGIOGRAM, LOWER EXTREMITY;  Surgeon: Teddy Huber MD;  Location: [facility] OR [address] [place] [address];  Service: Vascular;  Laterality: Left;    CATHETERIZATION OF BOTH LEFT AND RIGHT HEART N/A 12/18/2019    Procedure: CATHETERIZATION, HEART, BOTH LEFT AND RIGHT;  Surgeon: Que Fernando III, MD;  Location: [facility] CATH LAB;  Service: Cardiology;  Laterality: N/A;    CORONARY ANGIOGRAPHY N/A  12/18/2019    Procedure: ANGIOGRAM, CORONARY ARTERY;  Surgeon: Que Fernando III, MD;  Location: [facility] CATH LAB;  Service: Cardiology;  Laterality: N/A;    CORONARY ANGIOGRAPHY INCLUDING BYPASS GRAFTS WITH CATHETERIZATION OF LEFT HEART N/A 7/28/2020    Procedure: ANGIOGRAM, CORONARY, INCLUDING BYPASS GRAFT, WITH LEFT HEART CATHETERIZATION, 9 am;  Surgeon: Rachel Easley MD;  Location: [facility] CATH LAB;  Service: Cardiology;  Laterality: N/A;    CORONARY ARTERY BYPASS GRAFT (CABG) N/A 1/14/2020    Procedure: CORONARY ARTERY BYPASS GRAFT (CABG) x 1     Off Pump;  Surgeon: Huang Altamirano MD;  Location: [facility] OR [address] [place] [address];  Service: Cardiovascular;  Laterality: N/A;    CREATION OF FEMORAL-TIBIAL ARTERY BYPASS Left 4/29/2021    Procedure: CREATION, BYPASS, ARTERIAL, FEMORAL TO ANTERIOR TIBIAL;  Surgeon: Teddy Huber MD;  Location: [facility] OR [address] [place] [address];  Service: Vascular;  Laterality: Left;    CREATION OF FEMOROPOPLITEAL ARTERIAL BYPASS USING GRAFT Left 8/18/2020    Procedure: CREATION, BYPASS, ARTERIAL, FEMORAL TO POPLITEAL, USING GRAFT, LEFT LOWER EXTREMITY;  Surgeon: Teddy Huber MD;  Location: [facility] OR;  Service: Vascular;  Laterality: Left;  REQUEST 7:15 A.M. START----COVID NEGATIVE ON 8/17 1ST CASE JASWANT PER LEANA ON 8/7/2020 @ 942AM-LO  RN PREOP 8/12/2020   T/S-----CLEARED BY CARDS-------PENDING INSURANCE    DEBRIDEMENT OF FOOT Left 9/8/2020    Procedure: DEBRIDEMENT, FOOT;  Surgeon: Rosio Mayes DPM;  Location: [facility] OR;  Service: Podiatry;  Laterality: Left;  request neoxx .   RN Pre Op 9-4-2020, Covid negative on 9/5/20. C A    DEBRIDEMENT OF FOOT  3/4/2021    Procedure: DEBRIDEMENT, FOOT;  Surgeon: Teddy Huber MD;  Location: [facility] OR;  Service: Vascular;;    DEBRIDEMENT OF FOOT Left 3/9/2021    Procedure: DEBRIDEMENT, FOOT, bone biopsy;  Surgeon: Rosio Mayes DPM;  Location: [facility] OR;  Service: Podiatry;  Laterality: Left;  Request neoxx---COVID IN AM  REQUESTING NOON  START  RN Phone Pre op.On Blood thinners Plavix and Eliquis.  Covid am of surgery. C A    DEBRIDEMENT OF FOOT Left 5/4/2021    Procedure: DEBRIDEMENT, FOOT;  Surgeon: Farooq Morley DPM;  [facility];  Service: Podiatry;  Laterality: Left;    INSERTION OF TUNNELED CENTRAL VENOUS HEMODIALYSIS CATHETER N/A 1/27/2020    Procedure: Insertion, Catheter, Central Venous, Hemodialysis;  Surgeon: ESTEBAN Gomez III, MD;  Location: [facility] CATH LAB;  Service: Peripheral Vascular;  Laterality: N/A;    PERCUTANEOUS TRANSLUMINAL ANGIOPLASTY N/A 3/4/2021    Procedure: PTA (ANGIOPLASTY, PERCUTANEOUS, TRANSLUMINAL);  Surgeon: Teddy Huber MD;  Location: [facility] OR;  Service: Vascular;  Laterality: N/A;    REMOVAL OF ARTERIOVENOUS GRAFT Left 5/27/2021    Procedure: REMOVAL, GRAFT, LEFT LOWER EXTREMITY, WOUND EXPLORATION;  Surgeon: Teddy Huber MD;  Location: [facility] OR [address] [place] [address];  Service: Vascular;  Laterality: Left;    REMOVAL OF NAIL OF DIGIT Left 3/9/2021    Procedure: REMOVAL, NAIL, DIGIT;  Surgeon: Rosio Mayes DPM;  Location: [facility] OR;  Service: Podiatry;  Laterality: Left;    THROMBECTOMY Left 3/4/2021    Procedure: THROMBECTOMY, LEFT LOWER EXTREMITY BYPASS GRAFT, ANGIOGRAM, POSSIBLE INTERVENTION, POSSIBLE LEFT LOWER EXTREMITY BYPASS;  Surgeon: Teddy Huber MD;  Location: [facility] OR;  Service: Vascular;  Laterality: Left;    THROMBECTOMY Left 4/29/2021    Procedure: GRAFT THROMBECTOMY, LEFT LOWER EXTREMITY;  Surgeon: Teddy Huber MD;  [facility];  Service: Vascular;  Laterality: Left;  14.5 min  1179.85 mGy  341.01 Gycm2  240 ml dye    THROMBECTOMY  10/22/2021    Procedure: THROMBECTOMY;  Surgeon: Saad Arenas MD;  Location: [facility] CATH LAB/EP;  Service: Cardiology;;       Allergies:  Review of patient's allergies indicates:   Allergen Reactions    Ciprofloxacin Itching    Contrast media      Kidney injury    Iodine      Kidney injury       Medications:    In-Hospital Scheduled Medications:   allopurinoL  100 mg Oral Daily    amLODIPine  5 mg Oral Daily    atorvastatin  80 mg Oral QHS    ceFEPime (MAXIPIME) IVPB  1 g Intravenous Q12H    EScitalopram oxalate  10 mg Oral Daily    furosemide  40 mg Oral Daily    insulin detemir U-100  5 Units Subcutaneous QHS    metroNIDAZOLE  500 mg Oral Q8H    mirtazapine  7.5 mg Oral QHS    pregabalin  50 mg Oral TID    vancomycin (VANCOCIN) IVPB  1,250 mg Intravenous Q24H      In-Hospital PRN Medications:  acetaminophen, dextrose 50%, dextrose 50%, glucagon (human recombinant), glucose, glucose, hydrALAZINE, insulin aspart U-100, melatonin, naloxone, ondansetron, oxyCODONE-acetaminophen, pneumoc 13-azam conj-dip cr(PF), sars-cov-2 (covid-19), sodium chloride 0.9%, Pharmacy to dose Vancomycin consult **AND** vancomycin - pharmacy to dose   In-Hospital IV Infusion Medications:     Home Medications:  Prior to Admission medications    Medication Sig Start Date End Date Taking? Authorizing Provider   acetaminophen (TYLENOL) 500 MG tablet Take 2 tablets (1,000 mg total) by mouth every 8 (eight) hours as needed for Pain. 6/25/21  Yes Ruma Schulte MD   allopurinoL (ZYLOPRIM) 100 MG tablet Take 1 tablet (100 mg total) by mouth once daily. 7/31/20  Yes Hitesh Mason MD   atorvastatin (LIPITOR) 80 MG tablet Take 1 tablet (80 mg total) by mouth every evening. 6/8/21 6/8/22 Yes NONI Hanna MD   clopidogreL (PLAVIX) 75 mg tablet Take 1 tablet (75 mg total) by mouth once daily. 10/22/21  Yes Saad Arenas MD   EScitalopram oxalate (LEXAPRO) 10 MG tablet Take 1 tablet (10 mg total) by mouth once daily. 6/9/21 6/9/22 Yes NONI Hanna MD   furosemide (LASIX) 40 MG tablet Take 40 mg by mouth 2 (two) times daily.   Yes Historical Provider   insulin aspart U-100 (NOVOLOG FLEXPEN U-100 INSULIN) 100 unit/mL (3 mL) InPn pen Inject 5 Units into the skin 3 (three) times daily with meals. If not eating, ok to hold 4/13/21  Yes  Maria Del Rosario Moreira NP   insulin detemir U-100 (LEVEMIR FLEXTOUCH) 100 unit/mL (3 mL) SubQ InPn pen Inject 8 Units into the skin once daily.  Patient taking differently: Inject 10 Units into the skin every evening. 6/9/21 6/9/22 Yes NONI Hanna MD   pregabalin (LYRICA) 50 MG capsule Take 1 capsule (50 mg total) by mouth 3 (three) times daily. 12/3/21 6/3/22 Yes Fawad Jameson MD   sodium bicarbonate 650 MG tablet Take 1 tablet (650 mg total) by mouth 2 (two) times daily. 7/1/21 7/1/22 Yes Ruma Schulte MD   alogliptin (NESINA) 25 mg Tab Take 1 tablet (25 mg) by mouth once daily. 6/8/21   NONI Hanna MD   apixaban (ELIQUIS) 5 mg Tab Take 1 tablet (5 mg total) by mouth 2 (two) times daily. 8/23/21   Saad Arenas MD   blood-glucose meter,continuous (DEXCOM G6 ) Misc Use to check blood sugars 4 times/day 12/3/21   Fawad Jameson MD   blood-glucose sensor (DEXCOM G6 SENSOR) Radha Apply one sensor to skin every 10 days 12/3/21   Fawad Jameson MD   blood-glucose transmitter (DEXCOM G6 TRANSMITTER) Radha Replace transmitter every 3 months to use with dexcom sensor 12/3/21   Fawad Jameson MD   collagenase (SANTYL) ointment Apply topically once daily. Nursing to cleanse L groin and 2 areas to left AKA incision site with sterile normal saline,  then apply a thin layer of Santyl ointment to affected area daily. Cover with bordered foam island mepore dressing.  Patient not taking: Reported on 12/21/2021 6/26/21   Ruma Schulte MD   magnesium oxide (MAG-OX) 400 mg (241.3 mg magnesium) tablet Take 1 tablet (400 mg total) by mouth 2 (two) times daily.  Patient not taking: No sig reported 6/25/21   Ruma Schulte MD   melatonin (MELATIN) 3 mg tablet Take 2 tablets (6 mg total) by mouth nightly as needed for Insomnia.  Patient not taking: No sig reported 6/25/21   Ruma Schulte MD   mirtazapine (REMERON) 7.5 MG Tab Take 1 tablet (7.5 mg total) by mouth every evening. 6/8/21 6/8/22  NONI Lee  "MD Jacques   nystatin (MYCOSTATIN) powder Apply topically 2 (two) times daily.  Patient not taking: Reported on 2021   NONI Hanna MD   lancing device Misc 1 Device by Misc.(Non-Drug; Combo Route) route 2 (two) times daily with meals. 18  Rosales Barton MD       Family History:  Family History   Problem Relation Age of Onset    Diabetes Mother     Diabetes Father     Heart disease Maternal Grandmother     No Known Problems Maternal Grandfather     Diabetes Paternal Grandmother     No Known Problems Paternal Grandfather     Anesthesia problems Neg Hx        Social History:  Social History     Tobacco Use    Smoking status: Former Smoker    Smokeless tobacco: Never Used   Substance Use Topics    Alcohol use: No    Drug use: Yes     Types: Marijuana     Comment: occassional       Review of Systems   Constitutional: Negative for chills and fever.   HENT: Negative for trouble swallowing.    Respiratory: Negative for cough and shortness of breath.    Cardiovascular: Negative for chest pain and leg swelling.   Gastrointestinal: Negative for abdominal pain, blood in stool and nausea.   Genitourinary: Negative for difficulty urinating and dysuria.   Musculoskeletal: Right foot pain   Skin: Positive for wound.   Neurological: Positive for weakness. Negative for headaches.   Psychiatric/Behavioral: Negative for agitation.        Objective:   Last 24 Hour Vital Signs:  BP  Min: 124/78  Max: 192/95  Temp  Av.7 °F (36.5 °C)  Min: 97 °F (36.1 °C)  Max: 98.3 °F (36.8 °C)  Pulse  Av.4  Min: 90  Max: 106  Resp  Av.1  Min: 18  Max: 21  SpO2  Av.2 %  Min: 95 %  Max: 100 %  Height  Av' 5.3" (165.9 cm)  Min: 5' 5" (165.1 cm)  Max: 5' 6" (167.6 cm)  Weight  Av.6 kg (175 lb 7.8 oz)  Min: 76.2 kg (168 lb)  Max: 81.6 kg (179 lb 14.3 oz)  No intake/output data recorded.    Physical Exam  Vitals and nursing note reviewed.   Constitutional:       General: She is not in acute " distress.     Appearance: Normal appearance.   HENT:      Head: Normocephalic and atraumatic.      Nose: Nose normal.      Mouth/Throat:      Mouth: Mucous membranes are moist.   Eyes:      Pupils: Pupils are equal, round, and reactive to light.   Cardiovascular:      Rate and Rhythm: Normal rate and regular rhythm.      Pulses: Normal pulses.      Heart sounds: Normal heart sounds.   Pulmonary:      Effort: Pulmonary effort is normal.      Breath sounds: Normal breath sounds.   Abdominal:      General: Bowel sounds are normal.      Palpations: Abdomen is soft.   Musculoskeletal:         General: Tenderness present. No swelling. Normal range of motion.      Cervical back: Normal range of motion.   Skin:     General: Skin is warm and dry.      Findings: Lesion (right foot) present. (images as noted from 10/2022)  Neurological:      Mental Status: She is alert and oriented to person, place, and time.      Motor: Weakness present.   Psychiatric:         Behavior: Behavior normal.         Thought Content: Thought content normal.                     Laboratory Results:  Most Recent Data:  CBC:   Lab Results   Component Value Date    WBC 13.76 (H) 12/22/2021    HGB 7.7 (L) 12/22/2021    HCT 25.8 (L) 12/22/2021     (H) 12/22/2021     (H) 12/22/2021    RDW 14.0 12/22/2021     BMP:   Lab Results   Component Value Date     12/21/2021    K 4.0 12/21/2021     12/21/2021    CO2 24 12/21/2021    BUN 33 (H) 12/21/2021     (H) 12/21/2021    CALCIUM 8.1 (L) 12/21/2021    MG 2.1 12/21/2021    PHOS 3.5 06/18/2021     LFTs:   Lab Results   Component Value Date    PROT 7.2 12/21/2021    ALBUMIN 1.6 (L) 12/21/2021    BILITOT 0.2 12/21/2021    AST 12 12/21/2021    ALKPHOS 132 12/21/2021    ALT 7 (L) 12/21/2021     Coags:   Lab Results   Component Value Date    INR 1.4 (H) 06/17/2021     FLP:   Lab Results   Component Value Date    CHOL 266 (H) 12/03/2021    HDL 55 12/03/2021    LDLCALC 177.6 (H) 12/03/2021     TRIG 167 (H) 12/03/2021    CHOLHDL 20.7 12/03/2021     DM:   Lab Results   Component Value Date    HGBA1C 9.6 (H) 12/03/2021    HGBA1C 5.9 (H) 06/18/2021    HGBA1C 6.1 (H) 04/30/2021    LDLCALC 177.6 (H) 12/03/2021    CREATININE 1.6 (H) 12/21/2021     Thyroid:   Lab Results   Component Value Date    TSH 2.989 07/04/2021    FREET4 0.95 06/22/2021     Anemia:   Lab Results   Component Value Date    IRON 47 06/18/2021    TIBC 53 (L) 06/18/2021    FERRITIN 1,646 (H) 06/18/2021    IAPYWRJV77 806 06/18/2021    FOLATE 6.7 06/18/2021     Cardiac:   Lab Results   Component Value Date    TROPONINI 0.008 12/21/2021    BNP 46 07/04/2021     Urinalysis:   Lab Results   Component Value Date    LABURIN No growth 05/28/2021    COLORU Yellow 12/21/2021    SPECGRAV 1.010 12/21/2021    NITRITE Negative 12/21/2021    KETONESU Negative 12/21/2021    UROBILINOGEN Negative 12/21/2021       Trended Lab Data:  Recent Labs   Lab 12/21/21  1702 12/22/21  0809   WBC 15.29* 13.76*   HGB 7.4* 7.7*   HCT 24.2* 25.8*   * 611*   MCV 99* 102*   RDW 13.9 14.0     --    K 4.0  --      --    CO2 24  --    BUN 33*  --    *  --    PROT 7.2  --    ALBUMIN 1.6*  --    BILITOT 0.2  --    AST 12  --    ALKPHOS 132  --    ALT 7*  --          Microbiology Data:  Microbiology Results (last 7 days)     Procedure Component Value Units Date/Time    Urine culture [[id number]] Collected: 12/21/21 2204    Order Status: No result Specimen: Urine Updated: 12/21/21 2240            Antimicrobials:  Antibiotics (From admission, onward)            Start     Stop Route Frequency Ordered    12/22/21 2300  vancomycin 1.25 g in dextrose 5% 250 mL IVPB (ready to mix)         -- IV Every 24 hours (non-standard times) 12/21/21 2336    12/21/21 2200  metroNIDAZOLE tablet 500 mg         -- Oral Every 8 hours 12/21/21 1910 12/21/21 2015  cefepime in dextrose 5 % 1 gram/50 mL IVPB 1 g         -- IV Every 12 hours (non-standard times) 12/21/21 1910     "12/21/21 2009  vancomycin - pharmacy to dose  (vancomycin IVPB)        "And" Linked Group Details    -- IV pharmacy to manage frequency 12/21/21 1910        Antifungals (From admission, onward)            None        Antivirals (From admission, onward)    None            Other Results:    Radiology:  X-Ray Foot Complete Right    Result Date: 12/21/2021  EXAMINATION: XR FOOT COMPLETE 3 VIEW RIGHT CLINICAL HISTORY: . Pain, unspecified TECHNIQUE: AP, lateral, and oblique views of the right foot were performed. COMPARISON: July 6, 2015 FINDINGS: No acute fractures.  Interval development of extensive generalized osteopenia.  No definite acute fractures or bone destruction.  Small-vessel calcific atherosclerotic changes.  Mild soft tissue swelling dorsally at the level of the metatarsals.     As above. Electronically signed by: Gaurav Elizabeth Date:    12/21/2021 Time:    16:32    US Lower Extremity Arteries Right    Result Date: 12/21/2021  EXAMINATION: US LOWER EXTREMITY ARTERIES RIGHT CLINICAL HISTORY: Unspecified disturbances of skin sensation TECHNIQUE: Doppler evaluation of the right lower extremity arteries was performed. COMPARISON: None FINDINGS: Right common femoral and profundus femoral arteries are patent with peak velocities of 108, 151 respectively. Superficial femoral artery in the proximal and midportion demonstrates triphasic and biphasic waveform and peak velocities of 50 and 34 centimeters/second with blunting of the waveform compatible with pre occlusive pattern.  There is occlusion of the distal superficial femoral artery, popliteal artery with recanalization of the trifurcation with post occlusive waveform in the anterior tibial and posterior tibial arteries measuring 31 and 30 centimeters/second respectively.     Occlusion of the distal superficial femoral and popliteal artery on the right with reconstitution of the trifurcation via collaterals. The remainder of the proximal right lower extremity " arteries are patent as described. This report was flagged in Epic as abnormal. Electronically signed by: Reina Deal Date:    12/21/2021 Time:    19:10    MRI Foot Toes Without Contrast Right    Result Date: 12/21/2021  EXAMINATION: MRI FOOT TOES WITHOUT CONTRAST RIGHT CLINICAL HISTORY: Foot swelling, diabetic, osteomyelitis suspected, xray done; TECHNIQUE: Multiplanar multisequence MR imaging of right forefoot was performed without gadolinium. COMPARISON: Right forefoot x-ray, 12/21/2021 at 16:21 hours FINDINGS: There appears to be a soft tissue defect over the periungual region with exposed bone of the great toe.  Soft tissue edema in the remaining soft tissues of the toe and forefoot are noted without drainable abscess. There is bone marrow edema throughout the proximal phalanx of the great toe.  A focus of mixed signal intensity in the mid diaphysis of the 1st metatarsal suggests cartilaginous lesions such as enchondroma or repaired benign fibrous lesion. The remainder of the bones and soft tissues appear unremarkable by MR. Flexor and extensor tendons appear intact.  Intertarsal spaces are maintained.  No evidence of neuroma.     Bone marrow edema in the great toe phalanges compatible with osteomyelitis. Soft tissue defect suggested over the periungual regions of the dorsal great toe.  Correlate for exposed is distal phalanges. Benign appearing vascular or fibrous lesion in the mid diaphysis of the 1st meta tarsal. Mild generalized subcutaneous edema in the forefoot compatible with cellulitis without drainable abscess or definite deep muscle or tendon space involvement. Electronically signed by: Rush Deal Date:    12/21/2021 Time:    21:09

## 2021-12-22 NOTE — ED PROVIDER NOTES
Encounter Date: 12/21/2021       History     Chief Complaint   Patient presents with    Wound Check     Pt had a vascular surgery at this facility to the right leg on 09/22/21. Pt has old incision to right, lateral aspect of right leg: one small area looks pink and the rest of incision is darkened. Pt said pain has been increasing x2 wks. Tylenol q6hrs, robaxin, and pre-gablin taken and no pain relief. Pt also was taking 's percocet (7.5mg). Pt has bandages to right foot and amputation to left leg.      56-year-old female who presents for evaluation of pain in the right lower extremity which is intensified in worsened over last few days.  She has had an amputation of her left leg in the past because of her vascular disease in 2 months prior underwent a treatment to try and improve the blood flow in the right lower extremity.  Her  notes that they have missed multiple follow-up appointments since that time due to multiple issues and the bandage has been in place since the procedure in October.  The pain is intensified however which is what prompted them to finally come the hospital tonight.  Nothing in particular seemed to make it any better, it seems to be getting worse with time.        Review of patient's allergies indicates:   Allergen Reactions    Ciprofloxacin Itching    Contrast media      Kidney injury    Iodine      Kidney injury     Past Medical History:   Diagnosis Date    Anxiety     Chronic pain syndrome     CKD (chronic kidney disease), stage III     Depression     Diabetes mellitus     type 2    Diabetes mellitus, type 2     GERD (gastroesophageal reflux disease)     Hyperemesis 3/23/2021    Hyperlipemia     Hypertension     Hypokalemia 3/23/2021    Myocardial infarction 2010    minor-caused by stress per pt.    Osteomyelitis     Osteomyelitis of left foot 4/30/2021    PVD (peripheral vascular disease)     Vaginal delivery     x1     Past Surgical History:   Procedure  Laterality Date    ABOVE-KNEE AMPUTATION Left 5/18/2021    Procedure: AMPUTATION, ABOVE KNEE;  Surgeon: Teddy Huber MD;  Location: [address];  Service: Vascular;  Laterality: Left;    Angiogram - Right Extremity Right 7/9/15    angiogram-left leg  10/6/15    ANGIOGRAPHY OF LOWER EXTREMITY Left 4/29/2021    Procedure: ANGIOGRAM, LOWER EXTREMITY;  Surgeon: Teddy Huber MD;  Location: [address];  Service: Vascular;  Laterality: Left;    CATHETERIZATION OF BOTH LEFT AND RIGHT HEART N/A 12/18/2019    Procedure: CATHETERIZATION, HEART, BOTH LEFT AND RIGHT;  Surgeon: Que Fernando III, MD;  Location: [facility] CATH LAB;  Service: Cardiology;  Laterality: N/A;    CORONARY ANGIOGRAPHY N/A 12/18/2019    Procedure: ANGIOGRAM, CORONARY ARTERY;  Surgeon: Que Fernando III, MD;  Location: [facility] CATH LAB;  Service: Cardiology;  Laterality: N/A;    CORONARY ANGIOGRAPHY INCLUDING BYPASS GRAFTS WITH CATHETERIZATION OF LEFT HEART N/A 7/28/2020    Procedure: ANGIOGRAM, CORONARY, INCLUDING BYPASS GRAFT, WITH LEFT HEART CATHETERIZATION, 9 am;  Surgeon: Rachel Easley MD;  Location: [facility] CATH LAB;  Service: Cardiology;  Laterality: N/A;    CORONARY ARTERY BYPASS GRAFT (CABG) N/A 1/14/2020    Procedure: CORONARY ARTERY BYPASS GRAFT (CABG) x 1     Off Pump;  Surgeon: Huang Altamirano MD;  Location: [address];  Service: Cardiovascular;  Laterality: N/A;    CREATION OF FEMORAL-TIBIAL ARTERY BYPASS Left 4/29/2021    Procedure: CREATION, BYPASS, ARTERIAL, FEMORAL TO ANTERIOR TIBIAL;  Surgeon: Teddy Huber MD;  Location: [address];  Service: Vascular;  Laterality: Left;    CREATION OF FEMOROPOPLITEAL ARTERIAL BYPASS USING GRAFT Left 8/18/2020    Procedure: CREATION, BYPASS, ARTERIAL, FEMORAL TO POPLITEAL, USING GRAFT, LEFT LOWER EXTREMITY;  Surgeon: Teddy Huber MD;  Location: [facility] OR;  Service: Vascular;  Laterality: Left;  REQUEST 7:15 A.M. START----COVID NEGATIVE ON  8/17  1ST CASE STARTKENYA DUEÑAS ON 8/7/2020 @ 942AM-LO  RN PREOP 8/12/2020   T/S-----CLEARED BY CARDS-------PENDING INSURANCE    DEBRIDEMENT OF FOOT Left 9/8/2020    Procedure: DEBRIDEMENT, FOOT;  Surgeon: Rosio Mayes DPM;  Location: [facility] OR;  Service: Podiatry;  Laterality: Left;  request neoxx .   RN Pre Op 9-4-2020, Covid negative on 9/5/20. C A    DEBRIDEMENT OF FOOT  3/4/2021    Procedure: DEBRIDEMENT, FOOT;  Surgeon: Teddy Huber MD;  Location: [facility] OR;  Service: Vascular;;    DEBRIDEMENT OF FOOT Left 3/9/2021    Procedure: DEBRIDEMENT, FOOT, bone biopsy;  Surgeon: Rosio Mayes DPM;  Location: [facility] OR;  Service: Podiatry;  Laterality: Left;  Request neoxx---COVID IN AM  REQUESTING NOON START  RN Phone Pre op.On Blood thinners Plavix and Eliquis.  Covid am of surgery. C A    DEBRIDEMENT OF FOOT Left 5/4/2021    Procedure: DEBRIDEMENT, FOOT;  Surgeon: Farooq Morley DPM;  Location: [facility] OR 2ND FLR;  Service: Podiatry;  Laterality: Left;    INSERTION OF TUNNELED CENTRAL VENOUS HEMODIALYSIS CATHETER N/A 1/27/2020    Procedure: Insertion, Catheter, Central Venous, Hemodialysis;  Surgeon: ESTEBAN Gomez III, MD;  Location: [facility] CATH LAB;  Service: Peripheral Vascular;  Laterality: N/A;    PERCUTANEOUS TRANSLUMINAL ANGIOPLASTY N/A 3/4/2021    Procedure: PTA (ANGIOPLASTY, PERCUTANEOUS, TRANSLUMINAL);  Surgeon: Teddy Huber MD;  Location: [facility] OR;  Service: Vascular;  Laterality: N/A;    REMOVAL OF ARTERIOVENOUS GRAFT Left 5/27/2021    Procedure: REMOVAL, GRAFT, LEFT LOWER EXTREMITY, WOUND EXPLORATION;  Surgeon: Teddy Huber MD;  Location: [facility] OR 2ND FLR;  Service: Vascular;  Laterality: Left;    REMOVAL OF NAIL OF DIGIT Left 3/9/2021    Procedure: REMOVAL, NAIL, DIGIT;  Surgeon: Rosio Mayes DPM;  Location: [facility] OR;  Service: Podiatry;  Laterality: Left;    THROMBECTOMY Left 3/4/2021    Procedure: THROMBECTOMY, LEFT LOWER EXTREMITY BYPASS GRAFT, ANGIOGRAM, POSSIBLE  INTERVENTION, POSSIBLE LEFT LOWER EXTREMITY BYPASS;  Surgeon: Teddy Huber MD;  Location: [facility] OR;  Service: Vascular;  Laterality: Left;    THROMBECTOMY Left 4/29/2021    Procedure: GRAFT THROMBECTOMY, LEFT LOWER EXTREMITY;  Surgeon: Teddy Huber MD;  Location: [facility] OR 2ND FLR;  Service: Vascular;  Laterality: Left;  14.5 min  1179.85 mGy  341.01 Gycm2  240 ml dye    THROMBECTOMY  10/22/2021    Procedure: THROMBECTOMY;  Surgeon: Saad Arenas MD;  Location: [facility] CATH LAB/EP;  Service: Cardiology;;     Family History   Problem Relation Age of Onset    Diabetes Mother     Diabetes Father     Heart disease Maternal Grandmother     No Known Problems Maternal Grandfather     Diabetes Paternal Grandmother     No Known Problems Paternal Grandfather     Anesthesia problems Neg Hx      Social History     Tobacco Use    Smoking status: Former Smoker    Smokeless tobacco: Never Used   Substance Use Topics    Alcohol use: No    Drug use: Yes     Types: Marijuana     Comment: occassional     Review of Systems  Constitutional-no fever  HEENT-no congestion  Eyes-no redness  Respiratory-no shortness of breath  Cardio-no chest pain  GI-no abdominal pain  Endocrine-no cold intolerance  -no difficulty urinating  MSK-positive like pain  Skin-no rashes  Allergy-no environmental allergy  Neurologic-, no headache  Hematology-no swollen nodes  Behavioral-no confusion  Physical Exam     Initial Vitals [12/21/21 1509]   BP Pulse Resp Temp SpO2   (!) 178/78 98 20 97.6 °F (36.4 °C) 98 %      MAP       --         Physical Exam  Constitutional:  Chronically ill-appearing 56-year-old female in mild distress  Eyes: Conjunctivae normal.  ENT       Head: Normocephalic, atraumatic.       Nose: Normal external appearance        Mouth/Throat: no strigulous respirations   Hematological/Lymphatic/Immunilogical: no visible lymphadenopathy   Cardiovascular: Normal rate,   Respiratory: Normal respiratory effort.    Gastrointestinal: non distended   Musculoskeletal:  Left-sided AKA  The right lower extremity demonstrates atrophic changes from the level of the knee inferiorly, along the medial aspect of the right lower extremity his a darkening of the skin with single ulcerated wound in the center of the mid shin, the skin from the ankle inferiorly is markedly thickened, skin of the heel removed with a bandage with overt purulence draining and foul smelling bandages surrounding, from the midfoot through the end of all 5 digits of the same foot is intense dark color, no capillary refill, marked loss of skin  Neurologic: Alert, oriented. Normal speech and language. No gross focal neurologic deficits are appreciated.  Skin: Skin is warm, dry. No rash noted.  Psychiatric: Mood and affect are normal.   ED Course   Procedures  Labs Reviewed   CBC W/ AUTO DIFFERENTIAL - Abnormal; Notable for the following components:       Result Value    WBC 15.29 (*)     RBC 2.45 (*)     Hemoglobin 7.4 (*)     Hematocrit 24.2 (*)     MCV 99 (*)     MCHC 30.6 (*)     Platelets 787 (*)     Immature Granulocytes 0.9 (*)     Gran # (ANC) 11.6 (*)     Immature Grans (Abs) 0.13 (*)     Mono # 1.2 (*)     Gran % 75.6 (*)     Lymph % 13.8 (*)     All other components within normal limits   COMPREHENSIVE METABOLIC PANEL - Abnormal; Notable for the following components:    Glucose 171 (*)     BUN 33 (*)     Creatinine 1.6 (*)     Calcium 8.1 (*)     Albumin 1.6 (*)     ALT 7 (*)     eGFR if  42 (*)     eGFR if non  36 (*)     All other components within normal limits   SEDIMENTATION RATE - Abnormal; Notable for the following components:    Sed Rate 120 (*)     All other components within normal limits   C-REACTIVE PROTEIN - Abnormal; Notable for the following components:    .2 (*)     All other components within normal limits   LIPASE   MAGNESIUM   TROPONIN I   SARS-COV-2 RDRP GENE    Narrative:     This test utilizes  isothermal nucleic acid amplification   technology to detect the SARS-CoV-2 RdRp nucleic acid segment.   The analytical sensitivity (limit of detection) is 125 genome   equivalents/mL.   A POSITIVE result implies infection with the SARS-CoV-2 virus;   the patient is presumed to be contagious.     A NEGATIVE result means that SARS-CoV-2 nucleic acids are not   present above the limit of detection. A NEGATIVE result should be   treated as presumptive. It does not rule out the possibility of   COVID-19 and should not be the sole basis for treatment decisions.   If COVID-19 is strongly suspected based on clinical and exposure   history, re-testing using an alternate molecular assay should be   considered.   This test is only for use under the Food and Drug   Administration s Emergency Use Authorization (EUA).   Commercial kits are provided by [employer].   Performance characteristics of the EUA have been independently   verified by [facility] Department of   Pathology and Laboratory Medicine.   _________________________________________________________________   The authorized Fact Sheet for Healthcare Providers and the authorized Fact   Sheet for Patients of the ID NOW COVID-19 are available on the FDA   website:     https://www.fda.gov/media/147829/download  https://www.fda.gov/media/324170/download       POCT GLUCOSE, HAND-HELD DEVICE          Imaging Results          MRI Foot Toes Without Contrast Right (Final result)  Result time 12/21/21 21:09:25    Final result by Rush Deal MD (12/21/21 21:09:25)                 Impression:      Bone marrow edema in the great toe phalanges compatible with osteomyelitis.    Soft tissue defect suggested over the periungual regions of the dorsal great toe.  Correlate for exposed is distal phalanges.    Benign appearing vascular or fibrous lesion in the mid diaphysis of the 1st meta tarsal.    Mild generalized subcutaneous edema in the forefoot compatible  with cellulitis without drainable abscess or definite deep muscle or tendon space involvement.      Electronically signed by: Rush Deal  Date:    12/21/2021  Time:    21:09             Narrative:    EXAMINATION:  MRI FOOT TOES WITHOUT CONTRAST RIGHT    CLINICAL HISTORY:  Foot swelling, diabetic, osteomyelitis suspected, xray done;    TECHNIQUE:  Multiplanar multisequence MR imaging of right forefoot was performed without gadolinium.    COMPARISON:  Right forefoot x-ray, 12/21/2021 at 16:21 hours    FINDINGS:  There appears to be a soft tissue defect over the periungual region with exposed bone of the great toe.  Soft tissue edema in the remaining soft tissues of the toe and forefoot are noted without drainable abscess.    There is bone marrow edema throughout the proximal phalanx of the great toe.  A focus of mixed signal intensity in the mid diaphysis of the 1st metatarsal suggests cartilaginous lesions such as enchondroma or repaired benign fibrous lesion.    The remainder of the bones and soft tissues appear unremarkable by MR. Flexor and extensor tendons appear intact.  Intertarsal spaces are maintained.  No evidence of neuroma.                                US Lower Extremity Arteries Right (Final result)  Result time 12/21/21 19:10:02    Final result by Reina Deal MD (12/21/21 19:10:02)                 Impression:      Occlusion of the distal superficial femoral and popliteal artery on the right with reconstitution of the trifurcation via collaterals.    The remainder of the proximal right lower extremity arteries are patent as described.    This report was flagged in Epic as abnormal.      Electronically signed by: Reina Deal  Date:    12/21/2021  Time:    19:10             Narrative:    EXAMINATION:  US LOWER EXTREMITY ARTERIES RIGHT    CLINICAL HISTORY:  Unspecified disturbances of skin sensation    TECHNIQUE:  Doppler evaluation of the right lower extremity arteries was  performed.    COMPARISON:  None    FINDINGS:  Right common femoral and profundus femoral arteries are patent with peak velocities of 108, 151 respectively.    Superficial femoral artery in the proximal and midportion demonstrates triphasic and biphasic waveform and peak velocities of 50 and 34 centimeters/second with blunting of the waveform compatible with pre occlusive pattern.  There is occlusion of the distal superficial femoral artery, popliteal artery with recanalization of the trifurcation with post occlusive waveform in the anterior tibial and posterior tibial arteries measuring 31 and 30 centimeters/second respectively.                               X-Ray Foot Complete Right (Final result)  Result time 12/21/21 16:32:21    Final result by Gaurav Elizabeth MD (12/21/21 16:32:21)                 Impression:      As above.      Electronically signed by: Gaurav Elizabeth  Date:    12/21/2021  Time:    16:32             Narrative:    EXAMINATION:  XR FOOT COMPLETE 3 VIEW RIGHT    CLINICAL HISTORY:  . Pain, unspecified    TECHNIQUE:  AP, lateral, and oblique views of the right foot were performed.    COMPARISON:  July 6, 2015    FINDINGS:  No acute fractures.  Interval development of extensive generalized osteopenia.  No definite acute fractures or bone destruction.  Small-vessel calcific atherosclerotic changes.  Mild soft tissue swelling dorsally at the level of the metatarsals.                                 Medications   vancomycin (VANCOCIN) 2,000 mg in dextrose 5 % 500 mL IVPB (2,000 mg Intravenous New Bag 12/21/21 6807)   sodium chloride 0.9% flush 10 mL (has no administration in time range)   glucose chewable tablet 16 g (has no administration in time range)   glucose chewable tablet 24 g (has no administration in time range)   dextrose 50% injection 12.5 g (has no administration in time range)   dextrose 50% injection 25 g (has no administration in time range)   glucagon (human recombinant) injection 1 mg  (has no administration in time range)   naloxone 0.4 mg/mL injection 0.02 mg (has no administration in time range)   acetaminophen tablet 650 mg (has no administration in time range)   ondansetron injection 4 mg (has no administration in time range)   insulin aspart U-100 pen 0-5 Units (has no administration in time range)   melatonin tablet 6 mg (6 mg Oral Given 12/21/21 2113)   vancomycin - pharmacy to dose (has no administration in time range)   cefepime in dextrose 5 % 1 gram/50 mL IVPB 1 g (0 g Intravenous Stopped 12/21/21 2308)   metroNIDAZOLE tablet 500 mg (500 mg Oral Given 12/21/21 2149)   allopurinoL tablet 100 mg (has no administration in time range)   atorvastatin tablet 80 mg (80 mg Oral Given 12/21/21 2112)   EScitalopram oxalate tablet 10 mg (has no administration in time range)   insulin detemir U-100 pen 5 Units (has no administration in time range)   furosemide tablet 40 mg (has no administration in time range)   mirtazapine tablet 7.5 mg (7.5 mg Oral Given 12/21/21 2236)   pregabalin capsule 50 mg (50 mg Oral Given 12/21/21 2112)   hydrALAZINE injection 10 mg (0 mg Intravenous Return to Cabinet 12/21/21 2048)   amLODIPine tablet 5 mg (has no administration in time range)   pneumoc 13-aazm conj-dip cr(PF) (PREVNAR 13 (PF)) 0.5 mL (has no administration in time range)   sars-cov-2 (covid-19) (Pfizer COVID-19) 30 mcg/0.3 ml injection 0.3 mL (has no administration in time range)   oxyCODONE-acetaminophen 7.5-325 mg per tablet 1 tablet (1 tablet Oral Given 12/21/21 2234)   vancomycin 1.25 g in dextrose 5% 250 mL IVPB (ready to mix) (has no administration in time range)   morphine injection 4 mg (4 mg Intravenous Given 12/21/21 1703)     Medical Decision Making:   History:   Old Medical Records: I decided to obtain old medical records.  Old Records Summarized: records from clinic visits and records from previous admission(s).  Differential Diagnosis:   Limb ischemia, osteomyelitis, cellulitis  Clinical  Tests:   Lab Tests: Ordered and Reviewed  Radiological Study: Ordered and Reviewed  ED Management:  This is a 56-year-old female with profound skin changes in the right foot and a bandage in place for 2 months.  There signs of cellulitic changes in the lower leg, there is what appears to be near total loss of flow distal to the ankle in the right foot with necrotic toes Louann and drainage at the base of the ankle.  Will initiate treatment for potential osteomyelitis and cellulitis with IV antibiotics, although I do believe this woman may require more definitive treatment such as amputation.  I did discuss this with as well as her .  Have discussed the case with the on-call Hospital Medicine physician who agrees to accept this patient for admission further medication administration in consultation.  Other:   I discussed test(s) with the performing physician.       <> Summary of the Findings: Discussed the case with Dr. Cantu, discussed the case with Dr. Lynn, will plan for admission for further medication management in operative planning             ED Course as of 12/21/21 3091   Tue Dec 21, 2021   1808 Called on-call Hospital Medicine service for consideration of observation admission for this patient.  Discussed the case with on-call cardiologist Dr. Cantu, discussed the case with Dr. Lnyn as well about this patient's critical limb ischemia and necrosis in foot [TK]      ED Course User Index  [TK] Layo Chambers MD             Clinical Impression:   Final diagnoses:  [R52] Pain  [R20.9] Cold foot  [L03.90] Cellulitis, unspecified cellulitis site (Primary)  [M86.9] Osteomyelitis of right foot, unspecified type          ED Disposition Condition    Admit               Layo Chambers MD  12/21/21 2232

## 2021-12-22 NOTE — PROGRESS NOTES
[facility] Medicine  Progress Note    Patient Name: Suyapa Connelly  MRN: [id number]  Patient Class: IP- Inpatient   Admission Date: 12/21/2021  Length of Stay: 1 days  Attending Physician: Obinna Marr DO  Primary Care Provider: Magen Christensen MD        Subjective:     Principal Problem:Osteomyelitis of right foot        HPI:  Suyapa Connelly is a 57 yo female with a pmh of DM2, PVD, osteomyelitis, right AKA, CKD3, CAD s/p CABG, afib on eliquis. She presented with right foot pain x 1 month. She has an ulcer to her right heel. Presented with dressing to right heel which was removed and had purulent foul smelling drainage noted. The dressing was not changed in 2 months since she last saw Dr. Askew on 10/22/21 for arthrectomy, thrombectomy, and PTA with DCB to right lower extremity. Had not gone to follow up appts. She now has what appears to be dry gangrene to right toes with bone exposure. She has not been compliant with medications and states she had not taken eliquis in over a month. She states she has not been able to stand due to right leg weakness since her left AKA in May 2021. She also has a pressure wound to the lateral right lower leg. She takes her 's percocet for pain, which was helping some.       Overview/Hospital Course:  No notes on file    Interval History:  and mother at bedside, patient with extensive wound and vascular history, wants BKA for wound, cardio assessed and removed SCD's given her PVD, gen surgery consulted per podiatry recs    Review of Systems   Constitutional: Negative for chills and fever.   Respiratory: Negative for cough and shortness of breath.    Cardiovascular: Negative for chest pain and leg swelling.   Gastrointestinal: Negative for abdominal pain and nausea.   Musculoskeletal: Positive for arthralgias and myalgias.        Right foot pain   Skin: Positive for wound.   Neurological: Positive for weakness.     Objective:     Vital Signs  (Most Recent):  Temp: 97.6 °F (36.4 °C) (12/22/21 1055)  Pulse: 95 (12/22/21 1147)  Resp: 18 (12/22/21 1357)  BP: 139/66 (12/22/21 1055)  SpO2: 100 % (12/22/21 1055) Vital Signs (24h Range):  Temp:  [97 °F (36.1 °C)-98.3 °F (36.8 °C)] 97.6 °F (36.4 °C)  Pulse:  [] 95  Resp:  [18-21] 18  SpO2:  [95 %-100 %] 100 %  BP: (124-192)/(62-95) 139/66     Weight: 80.3 kg (177 lb 0.5 oz)  Body mass index is 29.46 kg/m².    Intake/Output Summary (Last 24 hours) at 12/22/2021 1423  Last data filed at 12/22/2021 0900  Gross per 24 hour   Intake 250 ml   Output --   Net 250 ml      Physical Exam  Vitals and nursing note reviewed.   Constitutional:       General: She is not in acute distress.     Appearance: Normal appearance.   HENT:      Head: Normocephalic and atraumatic.   Eyes:      Pupils: Pupils are equal, round, and reactive to light.   Cardiovascular:      Rate and Rhythm: Normal rate and regular rhythm.      Pulses: Normal pulses.      Heart sounds: Normal heart sounds.   Pulmonary:      Effort: Pulmonary effort is normal.   Abdominal:      General: Abdomen is flat.      Palpations: Abdomen is soft.   Musculoskeletal:         General: Tenderness present. No swelling.      Cervical back: Normal range of motion.   Skin:     General: Skin is warm and dry.      Findings: Lesion (right foot) present.   Neurological:      Mental Status: She is alert and oriented to person, place, and time.      Motor: Weakness present.         Significant Labs:   All pertinent labs within the past 24 hours have been reviewed.  Blood Culture: No results for input(s): LABBLOO in the last 48 hours.  BMP:   Recent Labs   Lab 12/21/21  1702   *      K 4.0      CO2 24   BUN 33*   CREATININE 1.6*   CALCIUM 8.1*   MG 2.1     CBC:   Recent Labs   Lab 12/21/21  1702 12/22/21  0809   WBC 15.29* 13.76*   HGB 7.4* 7.7*   HCT 24.2* 25.8*   * 611*     CMP:   Recent Labs   Lab 12/21/21  1702      K 4.0      CO2 24    *   BUN 33*   CREATININE 1.6*   CALCIUM 8.1*   PROT 7.2   ALBUMIN 1.6*   BILITOT 0.2   ALKPHOS 132   AST 12   ALT 7*   ANIONGAP 11   EGFRNONAA 36*     Lactic Acid:   Recent Labs   Lab 12/21/21  2139   LACTATE 1.6       Significant Imaging: I have reviewed all pertinent imaging results/findings within the past 24 hours.      Assessment/Plan:      * Osteomyelitis of right foot  - Non-healing wounds noted with purulent drainage to heal  - Gangrenous changes to toes, exposed bone noted  - Cont vanc, cefepime, flagyl  - MRI of right foot confirms osteo  - Podiatry assessed, consutled gen sx for BKA  - ID consulted  - NPO past midnight for possible intervention  - Hold eliquis  - Percocet PRN pain        Diabetic ulcer of right foot associated with type 2 diabetes mellitus  - See osteo      Cellulitis  - See osteo      Stage 3 chronic kidney disease due to type 2 diabetes mellitus  - At baseline  - Renally dose meds      Chronic diastolic heart failure  - Reports taking lasix daily for week prior admission  - Cont lasix daily      Chronic anemia  - Hgb drop from 13 to 7 over 2 month period  - Type and screen  - Hold plavix and eliquis (had not been taking)  - Denies acute blood loss  - FOBT pending, check with next BM      Critical lower limb ischemia  - Had intervention to RLE on 10/22/21  - Persistent non-healing diabetic right foot wounds   - Arterial US showing stenosis   - Cardiology following  - NPO past midnight, allow diet today  - Had not been taking eliquis, cont to hold for possible intervention                Paroxysmal atrial fibrillation  - NSR on tele  - Noncompliant with eliquis  - Cont to hold for possible RLE intervention      Type 2 diabetes mellitus with hyperglycemia, with long-term current use of insulin  - Hold home meds  - Detemir 5u nightly  - Accuchecks and ISS  - A1C 9.6        Essential hypertension  - Started amlodipine 5mg daily  - Monitor pressures      Gangrene  - Dry gangrene  right foot  - Podiatry assessed  - Gen surgery consulted for BKA        VTE Risk Mitigation (From admission, onward)         Ordered     Reason for No Pharmacological VTE Prophylaxis  Once        Question:  Reasons:  Answer:  Already adequately anticoagulated on oral Anticoagulants    12/21/21 1910     IP VTE HIGH RISK PATIENT  Once         12/21/21 1910                Discharge Planning   DEVAN:      Code Status: Full Code   Is the patient medically ready for discharge?:     Reason for patient still in hospital (select all that apply): Treatment                     Obinna Marr DO  Department of Hospital Medicine   [facility]

## 2021-12-22 NOTE — ASSESSMENT & PLAN NOTE
Diabetic ulcer of right foot associated with type 2 diabetes mellitus  Cellulitis    Non-healing wounds noted with purulent drainage to heal  Gangrenous changes to toes, exposed bone noted  Start vanc, cefepime, flagyl  MRI of right foot confirms osteo  Consult podiatry and ID  NPO past midnight for possible intervention  Hold eliquis  Percocet PRN pain

## 2021-12-22 NOTE — HPI
57yo female with PAD s/p RSFA and pop intervention 10/2021. HTN, HL, DMII, PAF on Eliquis, chronic diastolic heart failure, CKD stage III, cellulitis, right foot osteomyelitis, CAROLIN, MDD, vitamin D deficiency and diabetic wound to right foot who presented to the ER with complaints of increased pain to right foot for the past month. She underwent RLE revascularization by Dr. Arenas in October for non healing wound. Unfortunately she has not followed up since intervention with cardiology or wound care. The dressing to her right foot was removed with notation of purulent foul smelling drainage. She reports no frequent changing of dressing with last change during last MD visit. She reports compliance with her medications to me on rounds however per chart review notation of admittance of non compliance with medications and no Eliquis for the past month. She reports weakness to the right leg and inability to stand since her left AKA in 5/2021. She has been using her 's Percocet for pain relief. She underwent MRI with evidence of osteomyelitis and arterial ultrasound with occlusion of RSFA and pop.  .2 and was started on IV Vanc and Cefepim and was admitted to [facility] Medicine for further evaluation

## 2021-12-22 NOTE — PROGRESS NOTES
12/21/21 2107   Admission   Initial VN Admission Questions Complete   Communication Issues? None   Shift   Virtual Nurse - Patient Verbalized Approval Of Camera Use   Safety/Activity   Patient Rounds bed in low position;call light in patient/parent reach;visualized patient   Safety Promotion/Fall Prevention Fall Risk reviewed with patient/family   Positioning   Body Position sitting up in bed   Head of Bed (HOB) Positioning HOB elevated

## 2021-12-22 NOTE — PROGRESS NOTES
"[place] - Telemetry  Adult Nutrition  Progress Note    SUMMARY       Recommendations    Recommendation:   1. Add Cardiac restrictions to diet.   2. Add beneprotein bid.   3. Encourage intake at meals as tolerated.   4. Monitor weight/labs.   5. RD to follow to monitor po intake    Goals:   Pt will consume at least 50-75% intake at meals by RD follow up  Nutrition Goal Status: new  Communication of RD Recs: reviewed with RN    Assessment and Plan  Critical lower limb ischemia  Contributing Nutrition Diagnosis  Increased protein needs    Related to (etiology):   Wound healing    Signs and Symptoms (as evidenced by):   R leg wound    Interventions/Recommendations (treatment strategy):  Collaboration with other providers  Increased protein diet    Nutrition Diagnosis Status:   New      Malnutrition Assessment  Unable to assess NFPE 2/2 pt on isolation        Reason for Assessment  Reason For Assessment: identified at risk by screening criteria ([facility])  Diagnosis:  (osteomyeltitis)  Relevant Medical History: GERD< HTN, HLD, DM, MI, anxiety, DM, osteomyelitits, PVD, CKD, heart cath, CABG, thrombectomy, L AKA  General Information Comments: Pt on 2000cal ADA diet with 50&% intake at meals today. Carlos 16-R leg wound. Pt on isolation for ESBL and MDRO infections. Unable to assess NFPE at visit. No recent weight loss noted.  Nutrition Discharge Planning: pt to d/c on ADA Cardiac diet    Nutrition Risk Screen  Nutrition Risk Screen: no indicators present    Nutrition/Diet History  Food Preferences: no Protestant or cultural food prefs identified  Factors Affecting Nutritional Intake: None identified at this time    Anthropometrics  Temp: 97.6 °F (36.4 °C)  Height Method: Stated  Height: 5' 5" (165.1 cm)  Height (inches): 65 in  Weight Method: Bed Scale  Weight: 80.3 kg (177 lb 0.5 oz)  Weight (lb): 177.03 lb  Ideal Body Weight (IBW), Female: 125 lb  % Ideal Body Weight, Female (lb): 141.62 %  BMI (Calculated): 29.5  BMI Grade: 25 " - 29.9 - overweight  Amputation %: 16  Total Amputation %: 16  Amputation Ideal Body Weight (IBW), Female (lb): 109 lb  Amputee BMI (kg/m2): 35.17 kg/m2     Lab/Procedures/Meds  Pertinent Labs Reviewed: reviewed  Pertinent Labs Comments: BUN 33H, Crea 1.6H, Glu 171H, Ca 8.1L, Alb 1.6L  Pertinent Medications Reviewed: reviewed  Pertinent Medications Comments: Lasix, insulin, vancomycin    Estimated/Assessed Needs  Weight Used For Calorie Calculations: 80.3 kg (177 lb 0.5 oz)  Energy Calorie Requirements (kcal): 1686 (25 kcal/kg with amputation %)  Energy Need Method: Kcal/kg  Protein Requirements: 68g (1.0g/kg with amputation %)  Weight Used For Protein Calculations: 80.3 kg (177 lb 0.5 oz)  Estimated Fluid Requirement Method: RDA Method  RDA Method (mL): 1686  CHO Requirement: 200g    Nutrition Prescription Ordered  Current Diet Order: 2000cal ADA    Evaluation of Received Nutrient/Fluid Intake  I/O: not recorded  Energy Calories Required: not meeting needs  Protein Required: not meeting needs  Fluid Required: not meeting needs  Comments: LBM 12/20  % Intake of Estimated Energy Needs: 50 - 75 %  % Meal Intake: 50 - 75 %    Nutrition Risk  Level of Risk/Frequency of Follow-up:  (2xweekly)     Monitor and Evaluation  Food and Nutrient Intake: food and beverage intake  Food and Nutrient Adminstration: diet order  Physical Activity and Function: nutrition-related ADLs and IADLs  Anthropometric Measurements: weight  Biochemical Data, Medical Tests and Procedures: electrolyte and renal panel  Nutrition-Focused Physical Findings: overall appearance     Nutrition Follow-Up  RD Follow-up?: Yes

## 2021-12-22 NOTE — HPI
Pt is a 55 y/o female  has a past medical history of Anxiety, Chronic pain syndrome, CKD (chronic kidney disease), stage III, Depression, Diabetes mellitus, Diabetes mellitus, type 2, GERD (gastroesophageal reflux disease), Hyperemesis, Hyperlipemia, Hypertension, Hypokalemia, Myocardial infarction, Osteomyelitis, Osteomyelitis of left foot, PVD (peripheral vascular disease), and Vaginal delivery. Admitted for CLI and foot wound to right forefoot/heel which has not been examined since last October. Dressings were not changed.  Seen at bedside with  who relates he noticed a new wound on her right leg as well. She relates to significant pain. No other pedal complaints.

## 2021-12-22 NOTE — HPI
Suyapa Connelly is a 55 yo female with a pmh of DM2, PVD, osteomyelitis, right AKA, CKD3, CAD s/p CABG, afib on eliquis. She presented with right foot pain x 1 month. She has an ulcer to her right heel. Presented with dressing to right heel which was removed and had purulent foul smelling drainage noted. The dressing was not changed in 2 months since she last saw Dr. Askew on 10/22/21 for arthrectomy, thrombectomy, and PTA with DCB to right lower extremity. Had not gone to follow up appts. She now has what appears to be dry gangrene to right toes with bone exposure. She has not been compliant with medications and states she had not taken eliquis in over a month. She states she has not been able to stand due to right leg weakness since her left AKA in May 2021. She also has a pressure wound to the lateral right lower leg. She takes her 's percocet for pain, which was helping some.

## 2021-12-22 NOTE — H&P
[facility] Medicine  History & Physical    Patient Name: Suyapa Connelly  MRN: [id number]  Patient Class: IP- Inpatient  Admission Date: 12/21/2021  Attending Physician: Obinna Marr DO   Primary Care Provider: Magen Christensen MD         Patient information was obtained from patient, spouse/SO, past medical records and ER records.     Subjective:     Principal Problem:Osteomyelitis of right foot    Chief Complaint:   Chief Complaint   Patient presents with    Wound Check     Pt had a vascular surgery at this facility to the right leg on 09/22/21. Pt has old incision to right, lateral aspect of right leg: one small area looks pink and the rest of incision is darkened. Pt said pain has been increasing x2 wks. Tylenol q6hrs, robaxin, and pre-gablin taken and no pain relief. Pt also was taking 's percocet (7.5mg). Pt has bandages to right foot and amputation to left leg.         HPI: Suyapa Connelly is a 57 yo female with a pmh of DM2, PVD, osteomyelitis, right AKA, CKD3, CAD s/p CABG, afib on eliquis. She presented with right foot pain x 1 month. She has an ulcer to her right heel. Presented with dressing to right heel which was removed and had purulent foul smelling drainage noted. The dressing was not changed in 2 months since she last saw Dr. Askew on 10/22/21 for arthrectomy, thrombectomy, and PTA with DCB to right lower extremity. Had not gone to follow up appts. She now has what appears to be dry gangrene to right toes with bone exposure. She has not been compliant with medications and states she had not taken eliquis in over a month. She states she has not been able to stand due to right leg weakness since her left AKA in May 2021. She also has a pressure wound to the lateral right lower leg. She takes her 's percocet for pain, which was helping some.       Past Medical History:   Diagnosis Date    Anxiety     Chronic pain syndrome     CKD (chronic kidney disease), stage  III     Depression     Diabetes mellitus     type 2    Diabetes mellitus, type 2     GERD (gastroesophageal reflux disease)     Hyperemesis 3/23/2021    Hyperlipemia     Hypertension     Hypokalemia 3/23/2021    Myocardial infarction 2010    minor-caused by stress per pt.    Osteomyelitis     Osteomyelitis of left foot 4/30/2021    PVD (peripheral vascular disease)     Vaginal delivery     x1       Past Surgical History:   Procedure Laterality Date    ABOVE-KNEE AMPUTATION Left 5/18/2021    Procedure: AMPUTATION, ABOVE KNEE;  Surgeon: Teddy Huber MD;  Location: [facility] OR [address] [place] [address];  Service: Vascular;  Laterality: Left;    Angiogram - Right Extremity Right 7/9/15    angiogram-left leg  10/6/15    ANGIOGRAPHY OF LOWER EXTREMITY Left 4/29/2021    Procedure: ANGIOGRAM, LOWER EXTREMITY;  Surgeon: Teddy Huber MD;  Location: [facility] OR [address] [place] [address];  Service: Vascular;  Laterality: Left;    CATHETERIZATION OF BOTH LEFT AND RIGHT HEART N/A 12/18/2019    Procedure: CATHETERIZATION, HEART, BOTH LEFT AND RIGHT;  Surgeon: Que Fernando III, MD;  Location: [facility] CATH LAB;  Service: Cardiology;  Laterality: N/A;    CORONARY ANGIOGRAPHY N/A 12/18/2019    Procedure: ANGIOGRAM, CORONARY ARTERY;  Surgeon: Que Fernando III, MD;  Location: [facility] CATH LAB;  Service: Cardiology;  Laterality: N/A;    CORONARY ANGIOGRAPHY INCLUDING BYPASS GRAFTS WITH CATHETERIZATION OF LEFT HEART N/A 7/28/2020    Procedure: ANGIOGRAM, CORONARY, INCLUDING BYPASS GRAFT, WITH LEFT HEART CATHETERIZATION, 9 am;  Surgeon: Rachel Easley MD;  Location: [facility] CATH LAB;  Service: Cardiology;  Laterality: N/A;    CORONARY ARTERY BYPASS GRAFT (CABG) N/A 1/14/2020    Procedure: CORONARY ARTERY BYPASS GRAFT (CABG) x 1     Off Pump;  Surgeon: Huang Altamirano MD;  Location: [facility] OR [address] [place] [address];  Service: Cardiovascular;  Laterality: N/A;    CREATION OF FEMORAL-TIBIAL ARTERY BYPASS Left 4/29/2021    Procedure: CREATION, BYPASS,  ARTERIAL, FEMORAL TO ANTERIOR TIBIAL;  Surgeon: Teddy Huber MD;  [facility];  Service: Vascular;  Laterality: Left;    CREATION OF FEMOROPOPLITEAL ARTERIAL BYPASS USING GRAFT Left 8/18/2020    Procedure: CREATION, BYPASS, ARTERIAL, FEMORAL TO POPLITEAL, USING GRAFT, LEFT LOWER EXTREMITY;  Surgeon: Teddy Huber MD;  Location: [facility];  Service: Vascular;  Laterality: Left;  REQUEST 7:15 A.M. START----COVID NEGATIVE ON 8/17  1ST CASE STARTE PER LEANA ON 8/7/2020 @ 942AM-LO  RN PREOP 8/12/2020   T/S-----CLEARED BY CARDS-------PENDING INSURANCE    DEBRIDEMENT OF FOOT Left 9/8/2020    Procedure: DEBRIDEMENT, FOOT;  Surgeon: Rosio Mayes DPM;  Location: [facility];  Service: Podiatry;  Laterality: Left;  request neoxx .   RN Pre Op 9-4-2020, Covid negative on 9/5/20. C A    DEBRIDEMENT OF FOOT  3/4/2021    Procedure: DEBRIDEMENT, FOOT;  Surgeon: Teddy Huber MD;  Location: [facility];  Service: Vascular;;    DEBRIDEMENT OF FOOT Left 3/9/2021    Procedure: DEBRIDEMENT, FOOT, bone biopsy;  Surgeon: Rosio Mayes DPM;  [facility];  Service: Podiatry;  Laterality: Left;  Request neoxx---COVID IN AM  REQUESTING NOON START  RN Phone Pre op.On Blood thinners Plavix and Eliquis.  Covid am of surgery. C A    DEBRIDEMENT OF FOOT Left 5/4/2021    Procedure: DEBRIDEMENT, FOOT;  Surgeon: Farooq Morley DPM;  [facility];  Service: Podiatry;  Laterality: Left;    INSERTION OF TUNNELED CENTRAL VENOUS HEMODIALYSIS CATHETER N/A 1/27/2020    Procedure: Insertion, Catheter, Central Venous, Hemodialysis;  Surgeon: ESTEBAN Gomez III, MD;  Location: [facility] CATH LAB;  Service: Peripheral Vascular;  Laterality: N/A;    PERCUTANEOUS TRANSLUMINAL ANGIOPLASTY N/A 3/4/2021    Procedure: PTA (ANGIOPLASTY, PERCUTANEOUS, TRANSLUMINAL);  Surgeon: Teddy Huber MD;  Location: [facility] OR;  Service: Vascular;  Laterality: N/A;    REMOVAL OF ARTERIOVENOUS GRAFT Left 5/27/2021     Procedure: REMOVAL, GRAFT, LEFT LOWER EXTREMITY, WOUND EXPLORATION;  Surgeon: Teddy Huber MD;  Location: [facility] OR 2ND FLR;  Service: Vascular;  Laterality: Left;    REMOVAL OF NAIL OF DIGIT Left 3/9/2021    Procedure: REMOVAL, NAIL, DIGIT;  Surgeon: Rosio Mayes DPM;  Location: [facility] OR;  Service: Podiatry;  Laterality: Left;    THROMBECTOMY Left 3/4/2021    Procedure: THROMBECTOMY, LEFT LOWER EXTREMITY BYPASS GRAFT, ANGIOGRAM, POSSIBLE INTERVENTION, POSSIBLE LEFT LOWER EXTREMITY BYPASS;  Surgeon: Teddy Huber MD;  Location: [facility] OR;  Service: Vascular;  Laterality: Left;    THROMBECTOMY Left 4/29/2021    Procedure: GRAFT THROMBECTOMY, LEFT LOWER EXTREMITY;  Surgeon: Teddy Huber MD;  Location: [facility] OR 2ND FLR;  Service: Vascular;  Laterality: Left;  14.5 min  1179.85 mGy  341.01 Gycm2  240 ml dye    THROMBECTOMY  10/22/2021    Procedure: THROMBECTOMY;  Surgeon: Saad Arenas MD;  Location: [facility] CATH LAB/EP;  Service: Cardiology;;       Review of patient's allergies indicates:   Allergen Reactions    Ciprofloxacin Itching    Contrast media      Kidney injury    Iodine      Kidney injury       Current Facility-Administered Medications on File Prior to Encounter   Medication    lactated ringers infusion     Current Outpatient Medications on File Prior to Encounter   Medication Sig    acetaminophen (TYLENOL) 500 MG tablet Take 2 tablets (1,000 mg total) by mouth every 8 (eight) hours as needed for Pain.    allopurinoL (ZYLOPRIM) 100 MG tablet Take 1 tablet (100 mg total) by mouth once daily.    atorvastatin (LIPITOR) 80 MG tablet Take 1 tablet (80 mg total) by mouth every evening.    clopidogreL (PLAVIX) 75 mg tablet Take 1 tablet (75 mg total) by mouth once daily.    EScitalopram oxalate (LEXAPRO) 10 MG tablet Take 1 tablet (10 mg total) by mouth once daily.    furosemide (LASIX) 40 MG tablet Take 40 mg by mouth 2 (two) times daily.    insulin aspart U-100 (NOVOLOG FLEXPEN  U-100 INSULIN) 100 unit/mL (3 mL) InPn pen Inject 5 Units into the skin 3 (three) times daily with meals. If not eating, ok to hold    insulin detemir U-100 (LEVEMIR FLEXTOUCH) 100 unit/mL (3 mL) SubQ InPn pen Inject 8 Units into the skin once daily. (Patient taking differently: Inject 10 Units into the skin every evening.)    pregabalin (LYRICA) 50 MG capsule Take 1 capsule (50 mg total) by mouth 3 (three) times daily.    sodium bicarbonate 650 MG tablet Take 1 tablet (650 mg total) by mouth 2 (two) times daily.    alogliptin (NESINA) 25 mg Tab Take 1 tablet (25 mg) by mouth once daily.    apixaban (ELIQUIS) 5 mg Tab Take 1 tablet (5 mg total) by mouth 2 (two) times daily.    blood-glucose meter,continuous (DEXCOM G6 ) Misc Use to check blood sugars 4 times/day    blood-glucose sensor (DEXCOM G6 SENSOR) Radha Apply one sensor to skin every 10 days    blood-glucose transmitter (DEXCOM G6 TRANSMITTER) Radha Replace transmitter every 3 months to use with dexcom sensor    collagenase (SANTYL) ointment Apply topically once daily. Nursing to cleanse L groin and 2 areas to left AKA incision site with sterile normal saline,  then apply a thin layer of Santyl ointment to affected area daily. Cover with bordered foam island mepore dressing. (Patient not taking: Reported on 12/21/2021)    magnesium oxide (MAG-OX) 400 mg (241.3 mg magnesium) tablet Take 1 tablet (400 mg total) by mouth 2 (two) times daily. (Patient not taking: No sig reported)    melatonin (MELATIN) 3 mg tablet Take 2 tablets (6 mg total) by mouth nightly as needed for Insomnia. (Patient not taking: No sig reported)    mirtazapine (REMERON) 7.5 MG Tab Take 1 tablet (7.5 mg total) by mouth every evening.    nystatin (MYCOSTATIN) powder Apply topically 2 (two) times daily. (Patient not taking: Reported on 12/21/2021)    [DISCONTINUED] lancing device Misc 1 Device by Misc.(Non-Drug; Combo Route) route 2 (two) times daily with meals.     Family  History     Problem Relation (Age of Onset)    Diabetes Mother, Father, Paternal Grandmother    Heart disease Maternal Grandmother    No Known Problems Maternal Grandfather, Paternal Grandfather        Tobacco Use    Smoking status: Former Smoker    Smokeless tobacco: Never Used   Substance and Sexual Activity    Alcohol use: No    Drug use: Yes     Types: Marijuana     Comment: occassional    Sexual activity: Yes     Partners: Male     Review of Systems   Constitutional: Negative for chills and fever.   HENT: Negative for trouble swallowing.    Respiratory: Negative for cough and shortness of breath.    Cardiovascular: Negative for chest pain and leg swelling.   Gastrointestinal: Negative for abdominal pain, blood in stool and nausea.   Genitourinary: Negative for difficulty urinating and dysuria.   Musculoskeletal: Positive for arthralgias and myalgias.        Right foot pain   Skin: Positive for wound.   Neurological: Positive for weakness. Negative for headaches.   Psychiatric/Behavioral: Negative for agitation.     Objective:     Vital Signs (Most Recent):  Temp: 97 °F (36.1 °C) (12/21/21 2343)  Pulse: 98 (12/22/21 0000)  Resp: 18 (12/21/21 2343)  BP: 124/78 (12/21/21 2343)  SpO2: 95 % (12/21/21 2343) Vital Signs (24h Range):  Temp:  [97 °F (36.1 °C)-98.3 °F (36.8 °C)] 97 °F (36.1 °C)  Pulse:  [] 98  Resp:  [18-21] 18  SpO2:  [95 %-98 %] 95 %  BP: (124-192)/(70-95) 124/78     Weight: 76.2 kg (168 lb)  Body mass index is 27.12 kg/m².    Physical Exam  Vitals and nursing note reviewed.   Constitutional:       General: She is not in acute distress.     Appearance: Normal appearance.   HENT:      Head: Normocephalic and atraumatic.      Nose: Nose normal.      Mouth/Throat:      Mouth: Mucous membranes are moist.   Eyes:      Pupils: Pupils are equal, round, and reactive to light.   Cardiovascular:      Rate and Rhythm: Normal rate and regular rhythm.      Pulses: Normal pulses.      Heart sounds: Normal  heart sounds.   Pulmonary:      Effort: Pulmonary effort is normal.      Breath sounds: Normal breath sounds.   Abdominal:      General: Bowel sounds are normal.      Palpations: Abdomen is soft.   Musculoskeletal:         General: Tenderness present. No swelling. Normal range of motion.      Cervical back: Normal range of motion.   Skin:     General: Skin is warm and dry.      Findings: Lesion (right foot) present.   Neurological:      Mental Status: She is alert and oriented to person, place, and time.      Motor: Weakness present.   Psychiatric:         Behavior: Behavior normal.         Thought Content: Thought content normal.           CRANIAL NERVES     CN III, IV, VI   Pupils are equal, round, and reactive to light.       Significant Labs: All pertinent labs within the past 24 hours have been reviewed.    Significant Imaging: I have reviewed all pertinent imaging results/findings within the past 24 hours.    Assessment/Plan:     * Osteomyelitis of right foot  Diabetic ulcer of right foot associated with type 2 diabetes mellitus  Cellulitis    Non-healing wounds noted with purulent drainage to heal  Gangrenous changes to toes, exposed bone noted  Start vanc, cefepime, flagyl  MRI of right foot confirms osteo  Consult podiatry and ID  NPO past midnight for possible intervention  Hold eliquis  Percocet PRN pain        Diabetic ulcer of right foot associated with type 2 diabetes mellitus  See osteo      Cellulitis  See osteo      Stage 3 chronic kidney disease due to type 2 diabetes mellitus  At baseline  Renally dose meds      Chronic diastolic heart failure  Prior echo reviewed  Reports taking lasix daily over past week  Euvolemic on exam  Cont lasix daily      Chronic anemia  Hgb drop from 13 to 7 over 2 month period  Type and screen  Hold plavix and eliquis (had not been taking)  Denies acute blood loss  Check FOBT      Critical lower limb ischemia  Had intervention to RLE on 10/22/21  Persistent non-healing  diabetic right foot wounds   Arterial US showing stenosis   Consult cardiology  NPO past midnight  Had not been taking eliquis, cont to hold for possible intervention                Paroxysmal atrial fibrillation  NSR on telemetry  No EKG for review  Noncompliant with eliquis  Cont to hold for possible RLE intervention      Type 2 diabetes mellitus with hyperglycemia, with long-term current use of insulin  Hold home meds  Start detemir 5u nightly  accuchecks and SSI  Check A1C        Essential hypertension  Hypertensive in ED  Start amlodipine 5mg daily        VTE Risk Mitigation (From admission, onward)         Ordered     Reason for No Pharmacological VTE Prophylaxis  Once        Question:  Reasons:  Answer:  Already adequately anticoagulated on oral Anticoagulants    12/21/21 1910     IP VTE HIGH RISK PATIENT  Once         12/21/21 1910     Place sequential compression device  Until discontinued         12/21/21 1910                   Petra De Anda NP  Department of Hospital Medicine   [place] [facility]

## 2021-12-22 NOTE — SUBJECTIVE & OBJECTIVE
Past Medical History:   Diagnosis Date    Anxiety     Chronic pain syndrome     CKD (chronic kidney disease), stage III     Depression     Diabetes mellitus     type 2    Diabetes mellitus, type 2     GERD (gastroesophageal reflux disease)     Hyperemesis 3/23/2021    Hyperlipemia     Hypertension     Hypokalemia 3/23/2021    Myocardial infarction 2010    minor-caused by stress per pt.    Osteomyelitis     Osteomyelitis of left foot 4/30/2021    PVD (peripheral vascular disease)     Vaginal delivery     x1       Past Surgical History:   Procedure Laterality Date    ABOVE-KNEE AMPUTATION Left 5/18/2021    Procedure: AMPUTATION, ABOVE KNEE;  Surgeon: Teddy Huber MD;  Location: [facility] OR [address] [place] [address];  Service: Vascular;  Laterality: Left;    Angiogram - Right Extremity Right 7/9/15    angiogram-left leg  10/6/15    ANGIOGRAPHY OF LOWER EXTREMITY Left 4/29/2021    Procedure: ANGIOGRAM, LOWER EXTREMITY;  Surgeon: Teddy Huber MD;  Location: [address];  Service: Vascular;  Laterality: Left;    CATHETERIZATION OF BOTH LEFT AND RIGHT HEART N/A 12/18/2019    Procedure: CATHETERIZATION, HEART, BOTH LEFT AND RIGHT;  Surgeon: Que Fernando III, MD;  Location: [facility] CATH LAB;  Service: Cardiology;  Laterality: N/A;    CORONARY ANGIOGRAPHY N/A 12/18/2019    Procedure: ANGIOGRAM, CORONARY ARTERY;  Surgeon: Que Fernando III, MD;  Location: [facility] CATH LAB;  Service: Cardiology;  Laterality: N/A;    CORONARY ANGIOGRAPHY INCLUDING BYPASS GRAFTS WITH CATHETERIZATION OF LEFT HEART N/A 7/28/2020    Procedure: ANGIOGRAM, CORONARY, INCLUDING BYPASS GRAFT, WITH LEFT HEART CATHETERIZATION, 9 am;  Surgeon: Rachel Easley MD;  Location: [facility] CATH LAB;  Service: Cardiology;  Laterality: N/A;    CORONARY ARTERY BYPASS GRAFT (CABG) N/A 1/14/2020    Procedure: CORONARY ARTERY BYPASS GRAFT (CABG) x 1     Off Pump;  Surgeon: Huang Altamirano MD;  Location: [address];  Service:  Cardiovascular;  Laterality: N/A;    CREATION OF FEMORAL-TIBIAL ARTERY BYPASS Left 4/29/2021    Procedure: CREATION, BYPASS, ARTERIAL, FEMORAL TO ANTERIOR TIBIAL;  Surgeon: Teddy Huber MD;  Location: [facility] OR [address] [place] [address];  Service: Vascular;  Laterality: Left;    CREATION OF FEMOROPOPLITEAL ARTERIAL BYPASS USING GRAFT Left 8/18/2020    Procedure: CREATION, BYPASS, ARTERIAL, FEMORAL TO POPLITEAL, USING GRAFT, LEFT LOWER EXTREMITY;  Surgeon: Teddy Huber MD;  Location: [facility] OR;  Service: Vascular;  Laterality: Left;  REQUEST 7:15 A.M. START----COVID NEGATIVE ON 8/17  1ST CASE [place]AR[place] PER LEANA ON 8/7/2020 @ 942AM-LO  RN PREOP 8/12/2020   T/S-----CLEARED BY CARDS-------PENDING INSURANCE    DEBRIDEMENT OF FOOT Left 9/8/2020    Procedure: DEBRIDEMENT, FOOT;  Surgeon: Rosio Mayes DPM;  Location: [facility] OR;  Service: Podiatry;  Laterality: Left;  request neoxx .   RN Pre Op 9-4-2020, Covid negative on 9/5/20. C A    DEBRIDEMENT OF FOOT  3/4/2021    Procedure: DEBRIDEMENT, FOOT;  Surgeon: Teddy Huber MD;  Location: [facility] OR;  Service: Vascular;;    DEBRIDEMENT OF FOOT Left 3/9/2021    Procedure: DEBRIDEMENT, FOOT, bone biopsy;  Surgeon: Rosio Mayes DPM;  Location: [facility] OR;  Service: Podiatry;  Laterality: Left;  Request neoxx---COVID IN AM  REQUESTING NOON START  RN Phone Pre op.On Blood thinners Plavix and Eliquis.  Covid am of surgery. C A    DEBRIDEMENT OF FOOT Left 5/4/2021    Procedure: DEBRIDEMENT, FOOT;  Surgeon: Farooq Morley DPM;  [facility];  Service: Podiatry;  Laterality: Left;    INSERTION OF TUNNELED CENTRAL VENOUS HEMODIALYSIS CATHETER N/A 1/27/2020    Procedure: Insertion, Catheter, Central Venous, Hemodialysis;  Surgeon: ESTEBAN Gomez III, MD;  Location: [facility] CATH LAB;  Service: Peripheral Vascular;  Laterality: N/A;    PERCUTANEOUS TRANSLUMINAL ANGIOPLASTY N/A 3/4/2021    Procedure: PTA (ANGIOPLASTY, PERCUTANEOUS, TRANSLUMINAL);  Surgeon: Teddy BLANCA  MD Cecile;  Location: [facility] OR;  Service: Vascular;  Laterality: N/A;    REMOVAL OF ARTERIOVENOUS GRAFT Left 5/27/2021    Procedure: REMOVAL, GRAFT, LEFT LOWER EXTREMITY, WOUND EXPLORATION;  Surgeon: Teddy Huber MD;  Location: [facility] OR 2ND FLR;  Service: Vascular;  Laterality: Left;    REMOVAL OF NAIL OF DIGIT Left 3/9/2021    Procedure: REMOVAL, NAIL, DIGIT;  Surgeon: Rosio Mayes DPM;  Location: [facility] OR;  Service: Podiatry;  Laterality: Left;    THROMBECTOMY Left 3/4/2021    Procedure: THROMBECTOMY, LEFT LOWER EXTREMITY BYPASS GRAFT, ANGIOGRAM, POSSIBLE INTERVENTION, POSSIBLE LEFT LOWER EXTREMITY BYPASS;  Surgeon: Teddy Huber MD;  Location: [facility] OR;  Service: Vascular;  Laterality: Left;    THROMBECTOMY Left 4/29/2021    Procedure: GRAFT THROMBECTOMY, LEFT LOWER EXTREMITY;  Surgeon: Teddy Huber MD;  Location: [facility] OR 2ND FLR;  Service: Vascular;  Laterality: Left;  14.5 min  1179.85 mGy  341.01 Gycm2  240 ml dye    THROMBECTOMY  10/22/2021    Procedure: THROMBECTOMY;  Surgeon: Saad Arenas MD;  Location: [facility] CATH LAB/EP;  Service: Cardiology;;       Review of patient's allergies indicates:   Allergen Reactions    Ciprofloxacin Itching    Contrast media      Kidney injury    Iodine      Kidney injury       Current Facility-Administered Medications on File Prior to Encounter   Medication    lactated ringers infusion     Current Outpatient Medications on File Prior to Encounter   Medication Sig    acetaminophen (TYLENOL) 500 MG tablet Take 2 tablets (1,000 mg total) by mouth every 8 (eight) hours as needed for Pain.    allopurinoL (ZYLOPRIM) 100 MG tablet Take 1 tablet (100 mg total) by mouth once daily.    atorvastatin (LIPITOR) 80 MG tablet Take 1 tablet (80 mg total) by mouth every evening.    clopidogreL (PLAVIX) 75 mg tablet Take 1 tablet (75 mg total) by mouth once daily.    EScitalopram oxalate (LEXAPRO) 10 MG tablet Take 1 tablet (10 mg total) by mouth once  daily.    furosemide (LASIX) 40 MG tablet Take 40 mg by mouth 2 (two) times daily.    insulin aspart U-100 (NOVOLOG FLEXPEN U-100 INSULIN) 100 unit/mL (3 mL) InPn pen Inject 5 Units into the skin 3 (three) times daily with meals. If not eating, ok to hold    insulin detemir U-100 (LEVEMIR FLEXTOUCH) 100 unit/mL (3 mL) SubQ InPn pen Inject 8 Units into the skin once daily. (Patient taking differently: Inject 10 Units into the skin every evening.)    pregabalin (LYRICA) 50 MG capsule Take 1 capsule (50 mg total) by mouth 3 (three) times daily.    sodium bicarbonate 650 MG tablet Take 1 tablet (650 mg total) by mouth 2 (two) times daily.    alogliptin (NESINA) 25 mg Tab Take 1 tablet (25 mg) by mouth once daily.    apixaban (ELIQUIS) 5 mg Tab Take 1 tablet (5 mg total) by mouth 2 (two) times daily.    blood-glucose meter,continuous (DEXCOM G6 ) Misc Use to check blood sugars 4 times/day    blood-glucose sensor (DEXCOM G6 SENSOR) Radha Apply one sensor to skin every 10 days    blood-glucose transmitter (DEXCOM G6 TRANSMITTER) Radha Replace transmitter every 3 months to use with dexcom sensor    collagenase (SANTYL) ointment Apply topically once daily. Nursing to cleanse L groin and 2 areas to left AKA incision site with sterile normal saline,  then apply a thin layer of Santyl ointment to affected area daily. Cover with bordered foam island mepore dressing. (Patient not taking: Reported on 12/21/2021)    magnesium oxide (MAG-OX) 400 mg (241.3 mg magnesium) tablet Take 1 tablet (400 mg total) by mouth 2 (two) times daily. (Patient not taking: No sig reported)    melatonin (MELATIN) 3 mg tablet Take 2 tablets (6 mg total) by mouth nightly as needed for Insomnia. (Patient not taking: No sig reported)    mirtazapine (REMERON) 7.5 MG Tab Take 1 tablet (7.5 mg total) by mouth every evening.    nystatin (MYCOSTATIN) powder Apply topically 2 (two) times daily. (Patient not taking: Reported on 12/21/2021)     [DISCONTINUED] lancing device Misc 1 Device by Misc.(Non-Drug; Combo Route) route 2 (two) times daily with meals.     Family History     Problem Relation (Age of Onset)    Diabetes Mother, Father, Paternal Grandmother    Heart disease Maternal Grandmother    No Known Problems Maternal Grandfather, Paternal Grandfather        Tobacco Use    Smoking status: Former Smoker    Smokeless tobacco: Never Used   Substance and Sexual Activity    Alcohol use: No    Drug use: Yes     Types: Marijuana     Comment: occassional    Sexual activity: Yes     Partners: Male     Review of Systems   Constitutional: Negative for chills and fever.   HENT: Negative for trouble swallowing.    Respiratory: Negative for cough and shortness of breath.    Cardiovascular: Negative for chest pain and leg swelling.   Gastrointestinal: Negative for abdominal pain, blood in stool and nausea.   Genitourinary: Negative for difficulty urinating and dysuria.   Musculoskeletal: Positive for arthralgias and myalgias.        Right foot pain   Skin: Positive for wound.   Neurological: Positive for weakness. Negative for headaches.   Psychiatric/Behavioral: Negative for agitation.     Objective:     Vital Signs (Most Recent):  Temp: 97 °F (36.1 °C) (12/21/21 2343)  Pulse: 98 (12/22/21 0000)  Resp: 18 (12/21/21 2343)  BP: 124/78 (12/21/21 2343)  SpO2: 95 % (12/21/21 2343) Vital Signs (24h Range):  Temp:  [97 °F (36.1 °C)-98.3 °F (36.8 °C)] 97 °F (36.1 °C)  Pulse:  [] 98  Resp:  [18-21] 18  SpO2:  [95 %-98 %] 95 %  BP: (124-192)/(70-95) 124/78     Weight: 76.2 kg (168 lb)  Body mass index is 27.12 kg/m².    Physical Exam  Vitals and nursing note reviewed.   Constitutional:       General: She is not in acute distress.     Appearance: Normal appearance.   HENT:      Head: Normocephalic and atraumatic.      Nose: Nose normal.      Mouth/Throat:      Mouth: Mucous membranes are moist.   Eyes:      Pupils: Pupils are equal, round, and reactive to  light.   Cardiovascular:      Rate and Rhythm: Normal rate and regular rhythm.      Pulses: Normal pulses.      Heart sounds: Normal heart sounds.   Pulmonary:      Effort: Pulmonary effort is normal.      Breath sounds: Normal breath sounds.   Abdominal:      General: Bowel sounds are normal.      Palpations: Abdomen is soft.   Musculoskeletal:         General: Tenderness present. No swelling. Normal range of motion.      Cervical back: Normal range of motion.   Skin:     General: Skin is warm and dry.      Findings: Lesion (right foot) present.   Neurological:      Mental Status: She is alert and oriented to person, place, and time.      Motor: Weakness present.   Psychiatric:         Behavior: Behavior normal.         Thought Content: Thought content normal.           CRANIAL NERVES     CN III, IV, VI   Pupils are equal, round, and reactive to light.       Significant Labs: All pertinent labs within the past 24 hours have been reviewed.    Significant Imaging: I have reviewed all pertinent imaging results/findings within the past 24 hours.

## 2021-12-22 NOTE — CONSULTS
[facility] - Telemetry  Podiatry  Consult Note    Patient Name: Suyapa Connelly  MRN: [id number]  Admission Date: 12/21/2021  Hospital Length of Stay: 1 days  Attending Physician: Obinna Marr DO  Primary Care Provider: Magen Christensen MD     Inpatient consult to Podiatry  Consult performed by: Tasha Echeverria DPM  Consult ordered by: Petra De Anda NP        Subjective:     History of Present Illness:  Pt is a 55 y/o female  has a past medical history of Anxiety, Chronic pain syndrome, CKD (chronic kidney disease), stage III, Depression, Diabetes mellitus, Diabetes mellitus, type 2, GERD (gastroesophageal reflux disease), Hyperemesis, Hyperlipemia, Hypertension, Hypokalemia, Myocardial infarction, Osteomyelitis, Osteomyelitis of left foot, PVD (peripheral vascular disease), and Vaginal delivery. Admitted for CLI and foot wound to right forefoot/heel which has not been examined since previous admission last October. Seen at bedside with  who relates he noticed a new wound on her right leg as well. She relates to significant pain. No other pedal complaints.       Scheduled Meds:   allopurinoL  100 mg Oral Daily    amLODIPine  5 mg Oral Daily    atorvastatin  80 mg Oral QHS    ceFEPime (MAXIPIME) IVPB  1 g Intravenous Q12H    EScitalopram oxalate  10 mg Oral Daily    furosemide  40 mg Oral Daily    insulin detemir U-100  5 Units Subcutaneous QHS    metroNIDAZOLE  500 mg Oral Q8H    mirtazapine  7.5 mg Oral QHS    pregabalin  50 mg Oral TID    vancomycin (VANCOCIN) IVPB  1,250 mg Intravenous Q24H     Continuous Infusions:  PRN Meds:acetaminophen, dextrose 50%, dextrose 50%, glucagon (human recombinant), glucose, glucose, hydrALAZINE, insulin aspart U-100, melatonin, naloxone, ondansetron, oxyCODONE-acetaminophen, pneumoc 13-azam conj-dip cr(PF), sars-cov-2 (covid-19), sodium chloride 0.9%, Pharmacy to dose Vancomycin consult **AND** vancomycin - pharmacy to dose    Review of patient's  allergies indicates:   Allergen Reactions    Ciprofloxacin Itching    Contrast media      Kidney injury    Iodine      Kidney injury        Past Medical History:   Diagnosis Date    Anxiety     Chronic pain syndrome     CKD (chronic kidney disease), stage III     Depression     Diabetes mellitus     type 2    Diabetes mellitus, type 2     GERD (gastroesophageal reflux disease)     Hyperemesis 3/23/2021    Hyperlipemia     Hypertension     Hypokalemia 3/23/2021    Myocardial infarction 2010    minor-caused by stress per pt.    Osteomyelitis     Osteomyelitis of left foot 4/30/2021    PVD (peripheral vascular disease)     Vaginal delivery     x1     Past Surgical History:   Procedure Laterality Date    ABOVE-KNEE AMPUTATION Left 5/18/2021    Procedure: AMPUTATION, ABOVE KNEE;  Surgeon: Teddy Huber MD;  Location: [facility] OR [address] [place] [address];  Service: Vascular;  Laterality: Left;    Angiogram - Right Extremity Right 7/9/15    angiogram-left leg  10/6/15    ANGIOGRAPHY OF LOWER EXTREMITY Left 4/29/2021    Procedure: ANGIOGRAM, LOWER EXTREMITY;  Surgeon: Teddy Huber MD;  Location: [facility] OR [address] [place] [address];  Service: Vascular;  Laterality: Left;    CATHETERIZATION OF BOTH LEFT AND RIGHT HEART N/A 12/18/2019    Procedure: CATHETERIZATION, HEART, BOTH LEFT AND RIGHT;  Surgeon: Que Fernando III, MD;  Location: [facility] CATH LAB;  Service: Cardiology;  Laterality: N/A;    CORONARY ANGIOGRAPHY N/A 12/18/2019    Procedure: ANGIOGRAM, CORONARY ARTERY;  Surgeon: Que Fernando III, MD;  Location: [facility] CATH LAB;  Service: Cardiology;  Laterality: N/A;    CORONARY ANGIOGRAPHY INCLUDING BYPASS GRAFTS WITH CATHETERIZATION OF LEFT HEART N/A 7/28/2020    Procedure: ANGIOGRAM, CORONARY, INCLUDING BYPASS GRAFT, WITH LEFT HEART CATHETERIZATION, 9 am;  Surgeon: Rachel Easley MD;  Location: [facility] CATH LAB;  Service: Cardiology;  Laterality: N/A;    CORONARY ARTERY BYPASS GRAFT (CABG) N/A 1/14/2020     Procedure: CORONARY ARTERY BYPASS GRAFT (CABG) x 1     Off Pump;  Surgeon: Huang Altamirano MD;  Location: [address];  Service: Cardiovascular;  Laterality: N/A;    CREATION OF FEMORAL-TIBIAL ARTERY BYPASS Left 4/29/2021    Procedure: CREATION, BYPASS, ARTERIAL, FEMORAL TO ANTERIOR TIBIAL;  Surgeon: Teddy Huber MD;  [facility];  Service: Vascular;  Laterality: Left;    CREATION OF FEMOROPOPLITEAL ARTERIAL BYPASS USING GRAFT Left 8/18/2020    Procedure: CREATION, BYPASS, ARTERIAL, FEMORAL TO POPLITEAL, USING GRAFT, LEFT LOWER EXTREMITY;  Surgeon: Teddy Huber MD;  Location: [facility];  Service: Vascular;  Laterality: Left;  REQUEST 7:15 A.M. START----COVID NEGATIVE ON 8/17 1ST CASE STARTKENYA PER LEANA ON 8/7/2020 @ 942AM-LO  RN PREOP 8/12/2020   T/S-----CLEARED BY CARDS-------PENDING INSURANCE    DEBRIDEMENT OF FOOT Left 9/8/2020    Procedure: DEBRIDEMENT, FOOT;  Surgeon: Rosio Mayes DPM;  Location: [facility];  Service: Podiatry;  Laterality: Left;  request neoxx .   RN Pre Op 9-4-2020, Covid negative on 9/5/20. C A    DEBRIDEMENT OF FOOT  3/4/2021    Procedure: DEBRIDEMENT, FOOT;  Surgeon: Teddy Huber MD;  Location: [facility];  Service: Vascular;;    DEBRIDEMENT OF FOOT Left 3/9/2021    Procedure: DEBRIDEMENT, FOOT, bone biopsy;  Surgeon: Rosio Mayes DPM;  Location: [facility] OR;  Service: Podiatry;  Laterality: Left;  Request neoxx---COVID IN AM  REQUESTING NOON START  RN Phone Pre op.On Blood thinners Plavix and Eliquis.  Covid am of surgery. C A    DEBRIDEMENT OF FOOT Left 5/4/2021    Procedure: DEBRIDEMENT, FOOT;  Surgeon: Farooq Morley DPM;  [address]  Service: Podiatry;  Laterality: Left;    INSERTION OF TUNNELED CENTRAL VENOUS HEMODIALYSIS CATHETER N/A 1/27/2020    Procedure: Insertion, Catheter, Central Venous, Hemodialysis;  Surgeon: ESTEBAN Gomez III, MD;  Location: [facility] CATH LAB;  Service: Peripheral Vascular;  Laterality: N/A;     PERCUTANEOUS TRANSLUMINAL ANGIOPLASTY N/A 3/4/2021    Procedure: PTA (ANGIOPLASTY, PERCUTANEOUS, TRANSLUMINAL);  Surgeon: Teddy Huber MD;  Location: [facility] OR;  Service: Vascular;  Laterality: N/A;    REMOVAL OF ARTERIOVENOUS GRAFT Left 5/27/2021    Procedure: REMOVAL, GRAFT, LEFT LOWER EXTREMITY, WOUND EXPLORATION;  Surgeon: Teddy Huber MD;  Location: [facility] OR 2ND FLR;  Service: Vascular;  Laterality: Left;    REMOVAL OF NAIL OF DIGIT Left 3/9/2021    Procedure: REMOVAL, NAIL, DIGIT;  Surgeon: Rosio Mayes DPM;  Location: [facility] OR;  Service: Podiatry;  Laterality: Left;    THROMBECTOMY Left 3/4/2021    Procedure: THROMBECTOMY, LEFT LOWER EXTREMITY BYPASS GRAFT, ANGIOGRAM, POSSIBLE INTERVENTION, POSSIBLE LEFT LOWER EXTREMITY BYPASS;  Surgeon: Teddy Huber MD;  Location: [facility] OR;  Service: Vascular;  Laterality: Left;    THROMBECTOMY Left 4/29/2021    Procedure: GRAFT THROMBECTOMY, LEFT LOWER EXTREMITY;  Surgeon: Teddy Huber MD;  Location: [facility] OR 2ND FLR;  Service: Vascular;  Laterality: Left;  14.5 min  1179.85 mGy  341.01 Gycm2  240 ml dye    THROMBECTOMY  10/22/2021    Procedure: THROMBECTOMY;  Surgeon: Saad Arenas MD;  Location: [facility] CATH LAB/EP;  Service: Cardiology;;       Family History     Problem Relation (Age of Onset)    Diabetes Mother, Father, Paternal Grandmother    Heart disease Maternal Grandmother    No Known Problems Maternal Grandfather, Paternal Grandfather        Tobacco Use    Smoking status: Former Smoker    Smokeless tobacco: Never Used   Substance and Sexual Activity    Alcohol use: No    Drug use: Yes     Types: Marijuana     Comment: occassional    Sexual activity: Yes     Partners: Male     Review of Systems   Constitutional: Negative for chills, diaphoresis, fatigue and fever.   Respiratory: Negative for cough and shortness of breath.    Cardiovascular: Negative for chest pain and leg swelling.   Gastrointestinal: Negative for nausea  and vomiting.   Musculoskeletal: Negative for arthralgias, back pain, gait problem and joint swelling.   Skin: Positive for color change and wound. Negative for pallor and rash.   Neurological: Positive for numbness. Negative for tremors, speech difficulty and weakness.   Psychiatric/Behavioral: Negative for agitation. The patient is not nervous/anxious.      Objective:     Vital Signs (Most Recent):  Temp: 97.6 °F (36.4 °C) (12/22/21 1055)  Pulse: 95 (12/22/21 1147)  Resp: 18 (12/22/21 1055)  BP: 139/66 (12/22/21 1055)  SpO2: 100 % (12/22/21 1055) Vital Signs (24h Range):  Temp:  [97 °F (36.1 °C)-98.3 °F (36.8 °C)] 97.6 °F (36.4 °C)  Pulse:  [] 95  Resp:  [18-21] 18  SpO2:  [95 %-100 %] 100 %  BP: (124-192)/(62-95) 139/66     Weight: 80.3 kg (177 lb 0.5 oz)  Body mass index is 29.46 kg/m².    Foot Exam    General  Orientation: alert and oriented to person, place, and time   Affect: appropriate       Right Foot/Ankle     Inspection and Palpation  Ecchymosis: none  Tenderness: bony tenderness   Swelling: none   Skin Exam: skin changes, abnormal color and ulcer; no drainage     Neurovascular  Dorsalis pedis: absent  Posterior tibial: absent  Saphenous nerve sensation: normal  Tibial nerve sensation: normal  Superficial peroneal nerve sensation: normal  Deep peroneal nerve sensation: normal  Sural nerve sensation: normal    Comments  Tenderness to right forefoot, heel and lateral leg ulcer.     Dry gangrenous changes extending from digits to tarsometatarsal joint of right foot. Right heel with lateral plantar necrotic eschar. Superficial ulcer along lateral leg extending to mid calf, appears ischemic. No drainage or malodor noted. No acute signs of infection.     Left Foot/Ankle      Comments  S/p left BKA      Laboratory:  A1C:   Recent Labs   Lab 12/03/21  1730   HGBA1C 9.6*     Blood Cultures: No results for input(s): LABBLOO in the last 48 hours.  CBC:   Recent Labs   Lab 12/22/21  0809   WBC 13.76*   RBC  2.54*   HGB 7.7*   HCT 25.8*   *   *   MCH 30.3   MCHC 29.8*     CMP:   Recent Labs   Lab 12/21/21  1702   *   CALCIUM 8.1*   ALBUMIN 1.6*   PROT 7.2      K 4.0   CO2 24      BUN 33*   CREATININE 1.6*   ALKPHOS 132   ALT 7*   AST 12   BILITOT 0.2     CRP:   Recent Labs   Lab 12/21/21  1702   .2*     ESR:   Recent Labs   Lab 12/21/21  1702   SEDRATE 120*     Wound Cultures:   Recent Labs   Lab 10/14/21  1108   LABAERO KLEBSIELLA PNEUMONIAE ESBL  Moderate  *       Diagnostic Results:  MRI: I have reviewed all pertinent results/findings within the past 24 hours.  forefoot MRI reviewed    Clinical Findings:  Dry gangrenous changes extending from digits to tarsometatarsal joint of right foot. Right heel with lateral plantar necrotic eschar. Superficial ulcer along lateral leg extending to mid calf, appears ischemic. No drainage or malodor noted. Diffuse edema to right foot. No clinical signs of abscess. No fluctuance or crepitus.        Assessment/Plan:     * Osteomyelitis of right foot  Per above    Diabetic ulcer of right foot associated with type 2 diabetes mellitus  Per above    Cellulitis  Antibiotics per hospital medicine, consider Infectious Disease consult    Stage 3 chronic kidney disease due to type 2 diabetes mellitus  Per hospital medicine    Chronic diastolic heart failure  Per hospital medicine    Chronic anemia  Per hospital medicine    Critical lower limb ischemia  Per Interventional Cardiology    Paroxysmal atrial fibrillation  Per hospital medicine    Type 2 diabetes mellitus with hyperglycemia, with long-term current use of insulin  Per hospital medicine    Essential hypertension  Per hospital medicine    Gangrene  -Patient examined today with dry necrotic changes extending from digits to midfoot and with necrotic eschar to heel. Ischemic appearing ulcer to lateral leg.   -Imaging studies reviewed   -Discussed surgical options with patient and her .  Patient and her  elect for BKA at this time due to extensive necrosis and history of poor healing. Podiatry is in agreement. General surgery consult placed  -Antibiotics per hospital medicine  -Recommend offloading heel and wounds while in bed. Nursing orders in for wound care  -Podiatry will sign off at this time.         Thank you for your consult. I will sign off. Please contact us if you have any additional questions.    Tasha Echeverria DPM  Podiatry  [facility] - Telemetry

## 2021-12-22 NOTE — ASSESSMENT & PLAN NOTE
- Hgb drop from 13 to 7 over 2 month period  - Type and screen  - Hold plavix and eliquis (had not been taking)  - Denies acute blood loss  - FOBT pending, check with next BM

## 2021-12-22 NOTE — ASSESSMENT & PLAN NOTE
Contributing Nutrition Diagnosis  Increased protein needs    Related to (etiology):   Wound healing    Signs and Symptoms (as evidenced by):   R leg wound    Interventions:  Collaboration with other providers  Increased protein diet  Modified Beverage- Geremias BID    Nutrition Diagnosis Status:   Continues

## 2021-12-22 NOTE — SUBJECTIVE & OBJECTIVE
Past Medical History:   Diagnosis Date    Anxiety     Chronic pain syndrome     CKD (chronic kidney disease), stage III     Depression     Diabetes mellitus     type 2    Diabetes mellitus, type 2     GERD (gastroesophageal reflux disease)     Hyperemesis 3/23/2021    Hyperlipemia     Hypertension     Hypokalemia 3/23/2021    Myocardial infarction 2010    minor-caused by stress per pt.    Osteomyelitis     Osteomyelitis of left foot 4/30/2021    PVD (peripheral vascular disease)     Vaginal delivery     x1       Past Surgical History:   Procedure Laterality Date    ABOVE-KNEE AMPUTATION Left 5/18/2021    Procedure: AMPUTATION, ABOVE KNEE;  Surgeon: Teddy Huber MD;  Location: [facility] OR [address] [place] [address];  Service: Vascular;  Laterality: Left;    Angiogram - Right Extremity Right 7/9/15    angiogram-left leg  10/6/15    ANGIOGRAPHY OF LOWER EXTREMITY Left 4/29/2021    Procedure: ANGIOGRAM, LOWER EXTREMITY;  Surgeon: Teddy Huber MD;  Location: [address];  Service: Vascular;  Laterality: Left;    CATHETERIZATION OF BOTH LEFT AND RIGHT HEART N/A 12/18/2019    Procedure: CATHETERIZATION, HEART, BOTH LEFT AND RIGHT;  Surgeon: Que Fernando III, MD;  Location: [facility] CATH LAB;  Service: Cardiology;  Laterality: N/A;    CORONARY ANGIOGRAPHY N/A 12/18/2019    Procedure: ANGIOGRAM, CORONARY ARTERY;  Surgeon: Que Fernando III, MD;  Location: [facility] CATH LAB;  Service: Cardiology;  Laterality: N/A;    CORONARY ANGIOGRAPHY INCLUDING BYPASS GRAFTS WITH CATHETERIZATION OF LEFT HEART N/A 7/28/2020    Procedure: ANGIOGRAM, CORONARY, INCLUDING BYPASS GRAFT, WITH LEFT HEART CATHETERIZATION, 9 am;  Surgeon: Rachel Easley MD;  Location: [facility] CATH LAB;  Service: Cardiology;  Laterality: N/A;    CORONARY ARTERY BYPASS GRAFT (CABG) N/A 1/14/2020    Procedure: CORONARY ARTERY BYPASS GRAFT (CABG) x 1     Off Pump;  Surgeon: Huang Altamirano MD;  Location: [address];  Service:  Cardiovascular;  Laterality: N/A;    CREATION OF FEMORAL-TIBIAL ARTERY BYPASS Left 4/29/2021    Procedure: CREATION, BYPASS, ARTERIAL, FEMORAL TO ANTERIOR TIBIAL;  Surgeon: Teddy Huber MD;  Location: [facility] OR [address] [place] [address];  Service: Vascular;  Laterality: Left;    CREATION OF FEMOROPOPLITEAL ARTERIAL BYPASS USING GRAFT Left 8/18/2020    Procedure: CREATION, BYPASS, ARTERIAL, FEMORAL TO POPLITEAL, USING GRAFT, LEFT LOWER EXTREMITY;  Surgeon: Teddy Huber MD;  Location: [facility] OR;  Service: Vascular;  Laterality: Left;  REQUEST 7:15 A.M. START----COVID NEGATIVE ON 8/17  1ST CASE STARTE PER LEANA ON 8/7/2020 @ 942AM-LO  RN PREOP 8/12/2020   T/S-----CLEARED BY CARDS-------PENDING INSURANCE    DEBRIDEMENT OF FOOT Left 9/8/2020    Procedure: DEBRIDEMENT, FOOT;  Surgeon: Rosio Mayes DPM;  Location: [facility] OR;  Service: Podiatry;  Laterality: Left;  request neoxx .   RN Pre Op 9-4-2020, Covid negative on 9/5/20. C A    DEBRIDEMENT OF FOOT  3/4/2021    Procedure: DEBRIDEMENT, FOOT;  Surgeon: Teddy Huber MD;  Location: [facility] OR;  Service: Vascular;;    DEBRIDEMENT OF FOOT Left 3/9/2021    Procedure: DEBRIDEMENT, FOOT, bone biopsy;  Surgeon: Rosio Mayes DPM;  Location: [facility] OR;  Service: Podiatry;  Laterality: Left;  Request neoxx---COVID IN AM  REQUESTING NOON START  RN Phone Pre op.On Blood thinners Plavix and Eliquis.  Covid am of surgery. C A    DEBRIDEMENT OF FOOT Left 5/4/2021    Procedure: DEBRIDEMENT, FOOT;  Surgeon: Farooq Morley DPM;  [facility];  Service: Podiatry;  Laterality: Left;    INSERTION OF TUNNELED CENTRAL VENOUS HEMODIALYSIS CATHETER N/A 1/27/2020    Procedure: Insertion, Catheter, Central Venous, Hemodialysis;  Surgeon: ESTEBAN Gomez III, MD;  Location: [facility] CATH LAB;  Service: Peripheral Vascular;  Laterality: N/A;    PERCUTANEOUS TRANSLUMINAL ANGIOPLASTY N/A 3/4/2021    Procedure: PTA (ANGIOPLASTY, PERCUTANEOUS, TRANSLUMINAL);  Surgeon: Teddy BLANCA  MD Cecile;  Location: [facility] OR;  Service: Vascular;  Laterality: N/A;    REMOVAL OF ARTERIOVENOUS GRAFT Left 5/27/2021    Procedure: REMOVAL, GRAFT, LEFT LOWER EXTREMITY, WOUND EXPLORATION;  Surgeon: Teddy Huber MD;  Location: [facility] OR 2ND FLR;  Service: Vascular;  Laterality: Left;    REMOVAL OF NAIL OF DIGIT Left 3/9/2021    Procedure: REMOVAL, NAIL, DIGIT;  Surgeon: Rosio Mayes DPM;  Location: [facility] OR;  Service: Podiatry;  Laterality: Left;    THROMBECTOMY Left 3/4/2021    Procedure: THROMBECTOMY, LEFT LOWER EXTREMITY BYPASS GRAFT, ANGIOGRAM, POSSIBLE INTERVENTION, POSSIBLE LEFT LOWER EXTREMITY BYPASS;  Surgeon: Teddy Huber MD;  Location: [facility] OR;  Service: Vascular;  Laterality: Left;    THROMBECTOMY Left 4/29/2021    Procedure: GRAFT THROMBECTOMY, LEFT LOWER EXTREMITY;  Surgeon: Teddy Huber MD;  Location: [facility] OR 2ND FLR;  Service: Vascular;  Laterality: Left;  14.5 min  1179.85 mGy  341.01 Gycm2  240 ml dye    THROMBECTOMY  10/22/2021    Procedure: THROMBECTOMY;  Surgeon: Saad Arenas MD;  Location: [facility] CATH LAB/EP;  Service: Cardiology;;       Review of patient's allergies indicates:   Allergen Reactions    Ciprofloxacin Itching    Contrast media      Kidney injury    Iodine      Kidney injury       Current Facility-Administered Medications on File Prior to Encounter   Medication    lactated ringers infusion     Current Outpatient Medications on File Prior to Encounter   Medication Sig    acetaminophen (TYLENOL) 500 MG tablet Take 2 tablets (1,000 mg total) by mouth every 8 (eight) hours as needed for Pain.    allopurinoL (ZYLOPRIM) 100 MG tablet Take 1 tablet (100 mg total) by mouth once daily.    atorvastatin (LIPITOR) 80 MG tablet Take 1 tablet (80 mg total) by mouth every evening.    clopidogreL (PLAVIX) 75 mg tablet Take 1 tablet (75 mg total) by mouth once daily.    EScitalopram oxalate (LEXAPRO) 10 MG tablet Take 1 tablet (10 mg total) by mouth once  daily.    furosemide (LASIX) 40 MG tablet Take 40 mg by mouth 2 (two) times daily.    insulin aspart U-100 (NOVOLOG FLEXPEN U-100 INSULIN) 100 unit/mL (3 mL) InPn pen Inject 5 Units into the skin 3 (three) times daily with meals. If not eating, ok to hold    insulin detemir U-100 (LEVEMIR FLEXTOUCH) 100 unit/mL (3 mL) SubQ InPn pen Inject 8 Units into the skin once daily. (Patient taking differently: Inject 10 Units into the skin every evening.)    pregabalin (LYRICA) 50 MG capsule Take 1 capsule (50 mg total) by mouth 3 (three) times daily.    sodium bicarbonate 650 MG tablet Take 1 tablet (650 mg total) by mouth 2 (two) times daily.    alogliptin (NESINA) 25 mg Tab Take 1 tablet (25 mg) by mouth once daily.    apixaban (ELIQUIS) 5 mg Tab Take 1 tablet (5 mg total) by mouth 2 (two) times daily.    blood-glucose meter,continuous (DEXCOM G6 ) Misc Use to check blood sugars 4 times/day    blood-glucose sensor (DEXCOM G6 SENSOR) Radha Apply one sensor to skin every 10 days    blood-glucose transmitter (DEXCOM G6 TRANSMITTER) Radha Replace transmitter every 3 months to use with dexcom sensor    collagenase (SANTYL) ointment Apply topically once daily. Nursing to cleanse L groin and 2 areas to left AKA incision site with sterile normal saline,  then apply a thin layer of Santyl ointment to affected area daily. Cover with bordered foam island mepore dressing. (Patient not taking: Reported on 12/21/2021)    magnesium oxide (MAG-OX) 400 mg (241.3 mg magnesium) tablet Take 1 tablet (400 mg total) by mouth 2 (two) times daily. (Patient not taking: No sig reported)    melatonin (MELATIN) 3 mg tablet Take 2 tablets (6 mg total) by mouth nightly as needed for Insomnia. (Patient not taking: No sig reported)    mirtazapine (REMERON) 7.5 MG Tab Take 1 tablet (7.5 mg total) by mouth every evening.    nystatin (MYCOSTATIN) powder Apply topically 2 (two) times daily. (Patient not taking: Reported on 12/21/2021)     [DISCONTINUED] lancing device Misc 1 Device by Misc.(Non-Drug; Combo Route) route 2 (two) times daily with meals.     Family History     Problem Relation (Age of Onset)    Diabetes Mother, Father, Paternal Grandmother    Heart disease Maternal Grandmother    No Known Problems Maternal Grandfather, Paternal Grandfather        Tobacco Use    Smoking status: Former Smoker    Smokeless tobacco: Never Used   Substance and Sexual Activity    Alcohol use: No    Drug use: Yes     Types: Marijuana     Comment: occassional    Sexual activity: Yes     Partners: Male     Review of Systems   Constitutional: Negative for chills, decreased appetite, diaphoresis, fever, malaise/fatigue, weight gain and weight loss.   Cardiovascular: Negative for chest pain, claudication, dyspnea on exertion, irregular heartbeat, leg swelling, near-syncope, orthopnea, palpitations and paroxysmal nocturnal dyspnea.   Respiratory: Negative for cough, shortness of breath, snoring, sputum production and wheezing.    Endocrine: Negative for cold intolerance, heat intolerance, polydipsia, polyphagia and polyuria.   Skin: Positive for poor wound healing. Negative for color change, dry skin, itching and nail changes.   Musculoskeletal: Negative for back pain, gout, joint pain and joint swelling.   Gastrointestinal: Negative for bloating, abdominal pain, constipation, diarrhea, hematemesis, hematochezia, melena, nausea and vomiting.   Genitourinary: Negative for dysuria, hematuria and nocturia.   Neurological: Negative for dizziness, headaches, light-headedness, numbness, paresthesias and weakness.   Psychiatric/Behavioral: Negative for altered mental status, depression and memory loss.     Objective:     Vital Signs (Most Recent):  Temp: 97.6 °F (36.4 °C) (12/22/21 1055)  Pulse: 95 (12/22/21 1147)  Resp: 18 (12/22/21 1055)  BP: 139/66 (12/22/21 1055)  SpO2: 100 % (12/22/21 1055) Vital Signs (24h Range):  Temp:  [97 °F (36.1 °C)-98.3 °F (36.8 °C)]  97.6 °F (36.4 °C)  Pulse:  [] 95  Resp:  [18-21] 18  SpO2:  [95 %-100 %] 100 %  BP: (124-192)/(62-95) 139/66     Weight: 80.3 kg (177 lb 0.5 oz)  Body mass index is 29.46 kg/m².    SpO2: 100 %  O2 Device (Oxygen Therapy): room air      Intake/Output Summary (Last 24 hours) at 12/22/2021 1219  Last data filed at 12/22/2021 0900  Gross per 24 hour   Intake 250 ml   Output --   Net 250 ml       Lines/Drains/Airways     Peripheral Intravenous Line                 Peripheral IV - Single Lumen 12/21/21 1700 20 G Right Other <1 day                Physical Exam  Constitutional:       General: She is not in acute distress.     Appearance: She is well-developed and well-nourished.   Cardiovascular:      Rate and Rhythm: Normal rate and regular rhythm.      Heart sounds: No murmur heard.  No gallop.    Pulmonary:      Effort: Pulmonary effort is normal. No respiratory distress.      Breath sounds: Normal breath sounds. No wheezing.   Abdominal:      General: Bowel sounds are normal. There is no distension.      Palpations: Abdomen is soft.      Tenderness: There is no abdominal tenderness.   Skin:     General: Skin is warm and dry.   Neurological:      Mental Status: She is alert and oriented to person, place, and time.         Significant Labs:   BMP:   Recent Labs   Lab 12/21/21 1702   *      K 4.0      CO2 24   BUN 33*   CREATININE 1.6*   CALCIUM 8.1*   MG 2.1    and CBC   Recent Labs   Lab 12/21/21  1702 12/21/21  1702 12/22/21  0809   WBC 15.29*  --  13.76*   HGB 7.4*  --  7.7*   HCT 24.2*   < > 25.8*   *  --  611*    < > = values in this interval not displayed.       Significant Imaging: Echocardiogram:   Transthoracic echo (TTE) complete (Cupid Only):   Results for orders placed or performed during the hospital encounter of 06/17/21   Echo Saline Bubble? No   Result Value Ref Range    BSA 2.07 m2    TDI SEPTAL 0.10 m/s    LV LATERAL E/E' RATIO 7.31 m/s    LV SEPTAL E/E' RATIO 9.50 m/s     "LA WIDTH 3.40 cm    TDI LATERAL 0.13 m/s    LVIDd 4.63 3.5 - 6.0 cm    IVS 0.92 0.6 - 1.1 cm    Posterior Wall 0.91 0.6 - 1.1 cm    LVIDs 3.47 2.1 - 4.0 cm    FS 25 28 - 44 %    LA volume 38.97 cm3    Sinus 2.64 cm    STJ 2.45 cm    Ascending aorta 3.13 cm    LV mass 142.32 g    LA size 3.42 cm    RVDD 3.34 cm    TAPSE 1.04 cm    RV S' 10.81 cm/s    Left Ventricle Relative Wall Thickness 0.39 cm    AV mean gradient 5 mmHg    AV valve area 2.05 cm2    AV Velocity Ratio 0.57     AV index (prosthetic) 0.72     MV valve area p 1/2 method 8.34 cm2    E/A ratio 1.76     Mean e' 0.12 m/s    E wave deceleration time 90.93 msec    MV "A" wave duration 6.57 msec    Pulm vein S/D ratio 1.12     LVOT diameter 1.91 cm    LVOT area 2.9 cm2    LVOT peak ty 0.87 m/s    LVOT peak VTI 19.98 cm    Ao peak ty 1.53 m/s    Ao VTI 27.88 cm    LVOT stroke volume 57.22 cm3    AV peak gradient 9 mmHg    E/E' ratio 8.26 m/s    MV Peak E Ty 0.95 m/s    TR Max Ty 2.63 m/s    MV stenosis pressure 1/2 time 26.37 ms    MV Peak A Ty 0.54 m/s    PV Peak S Ty 0.48 m/s    PV Peak D Ty 0.43 m/s    LV Systolic Volume 49.74 mL    LV Systolic Volume Index 24.9 mL/m2    LV Diastolic Volume 99.06 mL    LV Diastolic Volume Index 49.53 mL/m2    LA Volume Index 19.5 mL/m2    LV Mass Index 71 g/m2    RA Major Axis 4.25 cm    Left Atrium Minor Axis 3.85 cm    Left Atrium Major Axis 4.04 cm    Triscuspid Valve Regurgitation Peak Gradient 28 mmHg    LA Volume Index (Mod) 13.4 mL/m2    LA volume (mod) 26.75 cm3    RA Width 3.44 cm    Right Atrial Pressure (from IVC) 3 mmHg    EF 55 %    TV rest pulmonary artery pressure 31 mmHg    Narrative    · The estimated ejection fraction is 55%.  · The left ventricle is normal in size with normal systolic function.  · Normal left ventricular diastolic function.  · Normal right ventricular size with normal right ventricular systolic   function.  · Mild tricuspid regurgitation.  · The estimated PA systolic pressure is 31 " mmHg.  · Normal central venous pressure (3 mmHg).

## 2021-12-22 NOTE — SUBJECTIVE & OBJECTIVE
Interval History:  and mother at bedside, patient with extensive wound and vascular history, wants BKA for wound, cardio assessed and removed SCD's given her PVD, gen surgery consulted per podiatry recs    Review of Systems   Constitutional: Negative for chills and fever.   Respiratory: Negative for cough and shortness of breath.    Cardiovascular: Negative for chest pain and leg swelling.   Gastrointestinal: Negative for abdominal pain and nausea.   Musculoskeletal: Positive for arthralgias and myalgias.        Right foot pain   Skin: Positive for wound.   Neurological: Positive for weakness.     Objective:     Vital Signs (Most Recent):  Temp: 97.6 °F (36.4 °C) (12/22/21 1055)  Pulse: 95 (12/22/21 1147)  Resp: 18 (12/22/21 1357)  BP: 139/66 (12/22/21 1055)  SpO2: 100 % (12/22/21 1055) Vital Signs (24h Range):  Temp:  [97 °F (36.1 °C)-98.3 °F (36.8 °C)] 97.6 °F (36.4 °C)  Pulse:  [] 95  Resp:  [18-21] 18  SpO2:  [95 %-100 %] 100 %  BP: (124-192)/(62-95) 139/66     Weight: 80.3 kg (177 lb 0.5 oz)  Body mass index is 29.46 kg/m².    Intake/Output Summary (Last 24 hours) at 12/22/2021 1423  Last data filed at 12/22/2021 0900  Gross per 24 hour   Intake 250 ml   Output --   Net 250 ml      Physical Exam  Vitals and nursing note reviewed.   Constitutional:       General: She is not in acute distress.     Appearance: Normal appearance.   HENT:      Head: Normocephalic and atraumatic.   Eyes:      Pupils: Pupils are equal, round, and reactive to light.   Cardiovascular:      Rate and Rhythm: Normal rate and regular rhythm.      Pulses: Normal pulses.      Heart sounds: Normal heart sounds.   Pulmonary:      Effort: Pulmonary effort is normal.   Abdominal:      General: Abdomen is flat.      Palpations: Abdomen is soft.   Musculoskeletal:         General: Tenderness present. No swelling.      Cervical back: Normal range of motion.   Skin:     General: Skin is warm and dry.      Findings: Lesion (right foot)  present.   Neurological:      Mental Status: She is alert and oriented to person, place, and time.      Motor: Weakness present.         Significant Labs:   All pertinent labs within the past 24 hours have been reviewed.  Blood Culture: No results for input(s): LABBLOO in the last 48 hours.  BMP:   Recent Labs   Lab 12/21/21  1702   *      K 4.0      CO2 24   BUN 33*   CREATININE 1.6*   CALCIUM 8.1*   MG 2.1     CBC:   Recent Labs   Lab 12/21/21  1702 12/22/21  0809   WBC 15.29* 13.76*   HGB 7.4* 7.7*   HCT 24.2* 25.8*   * 611*     CMP:   Recent Labs   Lab 12/21/21  1702      K 4.0      CO2 24   *   BUN 33*   CREATININE 1.6*   CALCIUM 8.1*   PROT 7.2   ALBUMIN 1.6*   BILITOT 0.2   ALKPHOS 132   AST 12   ALT 7*   ANIONGAP 11   EGFRNONAA 36*     Lactic Acid:   Recent Labs   Lab 12/21/21  2139   LACTATE 1.6       Significant Imaging: I have reviewed all pertinent imaging results/findings within the past 24 hours.

## 2021-12-22 NOTE — PLAN OF CARE
Recommendation:   1. Add Cardiac restrictions to diet.   2. Add beneprotein bid.   3. Encourage intake at meals as tolerated.   4. Monitor weight/labs.   5. RD to follow to monitor po intake    Goals:   Pt will consume at least 50-75% intake at meals by RD follow up  Nutrition Goal Status: new

## 2021-12-23 ENCOUNTER — PATIENT OUTREACH (OUTPATIENT)
Dept: ADMINISTRATIVE | Facility: OTHER | Age: 56
End: 2021-12-23
Payer: MEDICARE

## 2021-12-23 LAB
ANION GAP SERPL CALC-SCNC: 10 MMOL/L (ref 8–16)
BACTERIA UR CULT: NO GROWTH
BASOPHILS # BLD AUTO: 0.07 K/UL (ref 0–0.2)
BASOPHILS NFR BLD: 0.6 % (ref 0–1.9)
BLD PROD TYP BPU: NORMAL
BLOOD UNIT EXPIRATION DATE: NORMAL
BLOOD UNIT TYPE CODE: 5100
BLOOD UNIT TYPE: NORMAL
BUN SERPL-MCNC: 26 MG/DL (ref 6–20)
CALCIUM SERPL-MCNC: 8 MG/DL (ref 8.7–10.5)
CHLORIDE SERPL-SCNC: 106 MMOL/L (ref 95–110)
CO2 SERPL-SCNC: 22 MMOL/L (ref 23–29)
CODING SYSTEM: NORMAL
CREAT SERPL-MCNC: 1.3 MG/DL (ref 0.5–1.4)
DIFFERENTIAL METHOD: ABNORMAL
DISPENSE STATUS: NORMAL
EOSINOPHIL # BLD AUTO: 0.2 K/UL (ref 0–0.5)
EOSINOPHIL NFR BLD: 1.8 % (ref 0–8)
ERYTHROCYTE [DISTWIDTH] IN BLOOD BY AUTOMATED COUNT: 13.9 % (ref 11.5–14.5)
EST. GFR  (AFRICAN AMERICAN): 53 ML/MIN/1.73 M^2
EST. GFR  (NON AFRICAN AMERICAN): 46 ML/MIN/1.73 M^2
GLUCOSE SERPL-MCNC: 85 MG/DL (ref 70–110)
HCT VFR BLD AUTO: 21.4 % (ref 37–48.5)
HGB BLD-MCNC: 6.5 G/DL (ref 12–16)
IMM GRANULOCYTES # BLD AUTO: 0.07 K/UL (ref 0–0.04)
IMM GRANULOCYTES NFR BLD AUTO: 0.6 % (ref 0–0.5)
LYMPHOCYTES # BLD AUTO: 1.9 K/UL (ref 1–4.8)
LYMPHOCYTES NFR BLD: 15.8 % (ref 18–48)
MAGNESIUM SERPL-MCNC: 1.9 MG/DL (ref 1.6–2.6)
MCH RBC QN AUTO: 30 PG (ref 27–31)
MCHC RBC AUTO-ENTMCNC: 30.4 G/DL (ref 32–36)
MCV RBC AUTO: 99 FL (ref 82–98)
MONOCYTES # BLD AUTO: 1.1 K/UL (ref 0.3–1)
MONOCYTES NFR BLD: 9.1 % (ref 4–15)
NEUTROPHILS # BLD AUTO: 8.4 K/UL (ref 1.8–7.7)
NEUTROPHILS NFR BLD: 72.1 % (ref 38–73)
NRBC BLD-RTO: 0 /100 WBC
PLATELET # BLD AUTO: 656 K/UL (ref 150–450)
PMV BLD AUTO: 9.7 FL (ref 9.2–12.9)
POCT GLUCOSE: 105 MG/DL (ref 70–110)
POCT GLUCOSE: 122 MG/DL (ref 70–110)
POCT GLUCOSE: 141 MG/DL (ref 70–110)
POCT GLUCOSE: 201 MG/DL (ref 70–110)
POTASSIUM SERPL-SCNC: 4 MMOL/L (ref 3.5–5.1)
RBC # BLD AUTO: 2.17 M/UL (ref 4–5.4)
SODIUM SERPL-SCNC: 138 MMOL/L (ref 136–145)
TRANS ERYTHROCYTES VOL PATIENT: NORMAL ML
VANCOMYCIN TROUGH SERPL-MCNC: 32.1 UG/ML (ref 10–22)
WBC # BLD AUTO: 11.68 K/UL (ref 3.9–12.7)

## 2021-12-23 PROCEDURE — 27000207 HC ISOLATION

## 2021-12-23 PROCEDURE — P9021 RED BLOOD CELLS UNIT: HCPCS | Performed by: INTERNAL MEDICINE

## 2021-12-23 PROCEDURE — 11000001 HC ACUTE MED/SURG PRIVATE ROOM

## 2021-12-23 PROCEDURE — 80048 BASIC METABOLIC PNL TOTAL CA: CPT | Performed by: NURSE PRACTITIONER

## 2021-12-23 PROCEDURE — 63600175 PHARM REV CODE 636 W HCPCS: Performed by: NURSE PRACTITIONER

## 2021-12-23 PROCEDURE — 36430 TRANSFUSION BLD/BLD COMPNT: CPT

## 2021-12-23 PROCEDURE — 63600175 PHARM REV CODE 636 W HCPCS: Performed by: INTERNAL MEDICINE

## 2021-12-23 PROCEDURE — 85025 COMPLETE CBC W/AUTO DIFF WBC: CPT | Performed by: NURSE PRACTITIONER

## 2021-12-23 PROCEDURE — 36415 COLL VENOUS BLD VENIPUNCTURE: CPT | Performed by: NURSE PRACTITIONER

## 2021-12-23 PROCEDURE — 80202 ASSAY OF VANCOMYCIN: CPT | Performed by: INTERNAL MEDICINE

## 2021-12-23 PROCEDURE — 94761 N-INVAS EAR/PLS OXIMETRY MLT: CPT

## 2021-12-23 PROCEDURE — 83735 ASSAY OF MAGNESIUM: CPT | Performed by: NURSE PRACTITIONER

## 2021-12-23 PROCEDURE — 25000003 PHARM REV CODE 250: Performed by: NURSE PRACTITIONER

## 2021-12-23 PROCEDURE — C9399 UNCLASSIFIED DRUGS OR BIOLOG: HCPCS | Performed by: NURSE PRACTITIONER

## 2021-12-23 PROCEDURE — 25000003 PHARM REV CODE 250: Performed by: INTERNAL MEDICINE

## 2021-12-23 PROCEDURE — 36415 COLL VENOUS BLD VENIPUNCTURE: CPT | Performed by: INTERNAL MEDICINE

## 2021-12-23 RX ORDER — SODIUM CHLORIDE 0.9 % (FLUSH) 0.9 %
10 SYRINGE (ML) INJECTION
Status: DISCONTINUED | OUTPATIENT
Start: 2021-12-23 | End: 2021-12-28 | Stop reason: HOSPADM

## 2021-12-23 RX ORDER — HYDROCODONE BITARTRATE AND ACETAMINOPHEN 500; 5 MG/1; MG/1
TABLET ORAL
Status: DISCONTINUED | OUTPATIENT
Start: 2021-12-23 | End: 2022-01-10 | Stop reason: HOSPADM

## 2021-12-23 RX ORDER — HYDROMORPHONE HYDROCHLORIDE 2 MG/ML
0.2 INJECTION, SOLUTION INTRAMUSCULAR; INTRAVENOUS; SUBCUTANEOUS EVERY 5 MIN PRN
Status: DISCONTINUED | OUTPATIENT
Start: 2021-12-23 | End: 2021-12-28 | Stop reason: HOSPADM

## 2021-12-23 RX ADMIN — OXYCODONE AND ACETAMINOPHEN 1 TABLET: 7.5; 325 TABLET ORAL at 02:12

## 2021-12-23 RX ADMIN — PREGABALIN 50 MG: 50 CAPSULE ORAL at 02:12

## 2021-12-23 RX ADMIN — PREGABALIN 50 MG: 50 CAPSULE ORAL at 09:12

## 2021-12-23 RX ADMIN — CEFEPIME HYDROCHLORIDE 1 G: 1 INJECTION, SOLUTION INTRAVENOUS at 09:12

## 2021-12-23 RX ADMIN — METRONIDAZOLE 500 MG: 500 TABLET ORAL at 01:12

## 2021-12-23 RX ADMIN — MIRTAZAPINE 7.5 MG: 7.5 TABLET ORAL at 09:12

## 2021-12-23 RX ADMIN — VANCOMYCIN HYDROCHLORIDE 1250 MG: 1.25 INJECTION, POWDER, LYOPHILIZED, FOR SOLUTION INTRAVENOUS at 10:12

## 2021-12-23 RX ADMIN — ESCITALOPRAM OXALATE 10 MG: 10 TABLET ORAL at 09:12

## 2021-12-23 RX ADMIN — AMLODIPINE BESYLATE 5 MG: 5 TABLET ORAL at 09:12

## 2021-12-23 RX ADMIN — METRONIDAZOLE 500 MG: 500 TABLET ORAL at 09:12

## 2021-12-23 RX ADMIN — FUROSEMIDE 40 MG: 40 TABLET ORAL at 09:12

## 2021-12-23 RX ADMIN — MELATONIN TAB 3 MG 6 MG: 3 TAB at 09:12

## 2021-12-23 RX ADMIN — ALLOPURINOL 100 MG: 100 TABLET ORAL at 09:12

## 2021-12-23 RX ADMIN — OXYCODONE AND ACETAMINOPHEN 1 TABLET: 7.5; 325 TABLET ORAL at 09:12

## 2021-12-23 RX ADMIN — INSULIN ASPART 1 UNITS: 100 INJECTION, SOLUTION INTRAVENOUS; SUBCUTANEOUS at 09:12

## 2021-12-23 RX ADMIN — METRONIDAZOLE 500 MG: 500 TABLET ORAL at 05:12

## 2021-12-23 RX ADMIN — OXYCODONE AND ACETAMINOPHEN 1 TABLET: 7.5; 325 TABLET ORAL at 04:12

## 2021-12-23 RX ADMIN — ATORVASTATIN CALCIUM 80 MG: 40 TABLET, FILM COATED ORAL at 09:12

## 2021-12-23 RX ADMIN — INSULIN DETEMIR 5 UNITS: 100 INJECTION, SOLUTION SUBCUTANEOUS at 09:12

## 2021-12-23 NOTE — PROGRESS NOTES
[facility]  Progress Note    Patient Name: Suyapa Connelly  MRN: [id number]  Patient Class: IP- Inpatient   Admission Date: 12/21/2021  Length of Stay: 2 days  Attending Physician: Obinna Marr DO  Primary Care Provider: Magen Christensen MD        Subjective:     Principal Problem:Osteomyelitis of right foot        HPI:  Suyapa Connelly is a 57 yo female with a pmh of DM2, PVD, osteomyelitis, right AKA, CKD3, CAD s/p CABG, afib on eliquis. She presented with right foot pain x 1 month. She has an ulcer to her right heel. Presented with dressing to right heel which was removed and had purulent foul smelling drainage noted. The dressing was not changed in 2 months since she last saw Dr. Askew on 10/22/21 for arthrectomy, thrombectomy, and PTA with DCB to right lower extremity. Had not gone to follow up appts. She now has what appears to be dry gangrene to right toes with bone exposure. She has not been compliant with medications and states she had not taken eliquis in over a month. She states she has not been able to stand due to right leg weakness since her left AKA in May 2021. She also has a pressure wound to the lateral right lower leg. She takes her 's percocet for pain, which was helping some.       Overview/Hospital Course:  No notes on file    Interval History: Patient resting in bed, pending surgical recs, consented for 1 U PRBC    Review of Systems   Constitutional: Negative for chills and fever.   Respiratory: Negative for cough and shortness of breath.    Cardiovascular: Negative for chest pain and leg swelling.   Gastrointestinal: Negative for abdominal pain and nausea.   Musculoskeletal: Positive for arthralgias and myalgias.        Right foot pain   Skin: Positive for wound.   Neurological: Positive for weakness.     Objective:     Vital Signs (Most Recent):  Temp: 97.9 °F (36.6 °C) (12/23/21 1648)  Pulse: 99 (12/23/21 1648)  Resp: 19 (12/23/21 1648)  BP: 130/67  (12/23/21 1648)  SpO2: 95 % (12/23/21 1648) Vital Signs (24h Range):  Temp:  [97.7 °F (36.5 °C)-98.6 °F (37 °C)] 97.9 °F (36.6 °C)  Pulse:  [] 99  Resp:  [16-19] 19  SpO2:  [95 %-99 %] 95 %  BP: (103-169)/(56-88) 130/67     Weight: 83 kg (182 lb 15.7 oz)  Body mass index is 30.45 kg/m².    Intake/Output Summary (Last 24 hours) at 12/23/2021 1755  Last data filed at 12/23/2021 1612  Gross per 24 hour   Intake 273 ml   Output 1100 ml   Net -827 ml      Physical Exam  Vitals and nursing note reviewed.   Constitutional:       General: She is not in acute distress.     Appearance: Normal appearance.   HENT:      Head: Normocephalic and atraumatic.   Eyes:      Pupils: Pupils are equal, round, and reactive to light.   Cardiovascular:      Rate and Rhythm: Normal rate and regular rhythm.      Pulses: Normal pulses.      Heart sounds: Normal heart sounds.   Pulmonary:      Effort: Pulmonary effort is normal.   Abdominal:      General: Abdomen is flat.      Palpations: Abdomen is soft.   Musculoskeletal:         General: Tenderness present. No swelling.      Cervical back: Normal range of motion.   Skin:     General: Skin is warm and dry.      Findings: Lesion (right foot) present.   Neurological:      Mental Status: She is alert and oriented to person, place, and time.      Motor: Weakness present.         Significant Labs:   All pertinent labs within the past 24 hours have been reviewed.  Blood Culture: No results for input(s): LABBLOO in the last 48 hours.  BMP:   Recent Labs   Lab 12/23/21  0604   GLU 85      K 4.0      CO2 22*   BUN 26*   CREATININE 1.3   CALCIUM 8.0*   MG 1.9     CBC:   Recent Labs   Lab 12/22/21  0809 12/23/21  0604   WBC 13.76* 11.68   HGB 7.7* 6.5*   HCT 25.8* 21.4*   * 656*     CMP:   Recent Labs   Lab 12/23/21  0604      K 4.0      CO2 22*   GLU 85   BUN 26*   CREATININE 1.3   CALCIUM 8.0*   ANIONGAP 10   EGFRNONAA 46*     Lactic Acid:   Recent Labs   Lab  12/21/21  2139   LACTATE 1.6       Significant Imaging: I have reviewed all pertinent imaging results/findings within the past 24 hours.      Assessment/Plan:      * Osteomyelitis of right foot  - Non-healing wounds noted with purulent drainage to heal  - Gangrenous changes to toes, exposed bone noted  - Cont vanc, cefepime, flagyl  - MRI of right foot confirms osteo  - Podiatry assessed, consutled gen sx for BKA  - ID consulted  - NPO past midnight for possible intervention  - Hold eliquis  - Percocet PRN pain        Diabetic ulcer of right foot associated with type 2 diabetes mellitus  - See osteo      Cellulitis  - See osteo      Stage 3 chronic kidney disease due to type 2 diabetes mellitus  - At baseline  - Renally dose meds      Chronic diastolic heart failure  - Reports taking lasix daily for week prior admission  - Cont lasix daily      Chronic anemia  - Hgb drop from 13 to 7 over 2 month period  - Type and screen  - Hold plavix and eliquis (had not been taking)  - Denies acute blood loss  - FOBT pending, check with next BM      Critical lower limb ischemia  - Had intervention to RLE on 10/22/21  - Persistent non-healing diabetic right foot wounds   - Arterial US showing stenosis   - Cardiology following  - NPO past midnight, allow diet today  - Had not been taking eliquis, cont to hold for possible intervention                Paroxysmal atrial fibrillation  - NSR on tele  - Noncompliant with eliquis  - Cont to hold for possible RLE intervention      Type 2 diabetes mellitus with hyperglycemia, with long-term current use of insulin  - Hold home meds  - Detemir 5u nightly  - Accuchecks and ISS  - A1C 9.6        Essential hypertension  - Started amlodipine 5mg daily  - Monitor pressures      Gangrene  - Dry gangrene right foot  - Podiatry assessed  - Gen surgery consulted for BKA        VTE Risk Mitigation (From admission, onward)         Ordered     Reason for No Pharmacological VTE Prophylaxis  Once         Question:  Reasons:  Answer:  Already adequately anticoagulated on oral Anticoagulants    12/21/21 1910     IP VTE HIGH RISK PATIENT  Once         12/21/21 1910                Discharge Planning   DEVAN:      Code Status: Full Code   Is the patient medically ready for discharge?:     Reason for patient still in hospital (select all that apply): Treatment  Discharge Plan A: Rehab                  Obinna Marr DO  Department of [facility]   [facility]

## 2021-12-23 NOTE — PLAN OF CARE
Problem: Adult Inpatient Plan of Care  Goal: Plan of Care Review  Outcome: Ongoing, Progressing   Chart and care plan reviewed

## 2021-12-23 NOTE — PLAN OF CARE
Patient sleeping via [employer] connect. Pt spoke with pt's spouse to complete DCA.    Per pt's spouse, pt lives with him and he is patient's primary caregiver. Pt requires assistance with ADLS. Pt has a WC and able to self transfer into WC. Pt does not have any  services but has had it in the past. Pt scheduled to go for R BKA tomorrow. Pt anticipated to need IPR upon discharge. IPR preference is [facility] as patient has previously been there and had a good experience.    Future Appointments   Date Time Provider Department Center   1/5/2022 10:20 AM Saad Arenas MD [facility]   4/4/2022  2:30 PM [facility]        12/23/21 1239   Discharge Assessment   Assessment Type Discharge Planning Assessment   Confirmed/corrected address, phone number and insurance Yes   Confirmed Demographics Correct on Facesheet   Source of Information family   Communicated DEVAN with patient/caregiver Date not available/Unable to determine   Reason For Admission Osteomyelistis of right foot   Lives With spouse   Do you expect to return to your current living situation?   (TBD)   Do you have help at home or someone to help you manage your care at home? Yes   Who are your caregiver(s) and their phone number(s)? Randell Connelly (Spouse)   [phone] (Home Phon   Prior to hospitilization cognitive status: Unable to Assess   Current cognitive status: Unable to Assess   Walking or Climbing Stairs Difficulty ambulation difficulty, assistance 1 person;ambulation difficulty, requires equipment   Mobility Management WC   Dressing/Bathing Difficulty bathing difficulty, assistance 1 person   Dressing/Bathing Management 1 person   Home Accessibility wheelchair accessible   Equipment Currently Used at Home wheelchair;glucometer   Readmission within 30 days? No   Patient currently being followed by outpatient case management? No   Do you currently have service(s) that help you manage your care at home? No   Do  you take prescription medications? Yes   Do you have prescription coverage? Yes   Coverage medicare/medicaid   Do you have any problems affording any of your prescribed medications? No   Is the patient taking medications as prescribed? yes   Who is going to help you get home at discharge? Randell Connelly (Spouse)   [phone] (Home Phone   How do you get to doctors appointments? family or friend will provide   Are you on dialysis? No   Do you take coumadin? No   Discharge Plan A Rehab   Discharge Plan B Home Health   Discharge Barriers Identified None   Relationship/Environment   Name(s) of Who Lives With Patient Randell Connelly (Spouse)   [phone] (Home Phone

## 2021-12-23 NOTE — PLAN OF CARE
12/23/21 1331   Post-Acute Status   Post-Acute Authorization Placement   Post-Acute Placement Status Referrals Sent  (IPR referral sent to [facility] for d/c planning after pt goes for BKA tomorrow 12/24)   Discharge Plan   Discharge Plan A Rehab

## 2021-12-23 NOTE — SUBJECTIVE & OBJECTIVE
Interval History: Patient resting in bed, pending surgical recs, consented for 1 U PRBC    Review of Systems   Constitutional: Negative for chills and fever.   Respiratory: Negative for cough and shortness of breath.    Cardiovascular: Negative for chest pain and leg swelling.   Gastrointestinal: Negative for abdominal pain and nausea.   Musculoskeletal: Positive for arthralgias and myalgias.        Right foot pain   Skin: Positive for wound.   Neurological: Positive for weakness.     Objective:     Vital Signs (Most Recent):  Temp: 97.9 °F (36.6 °C) (12/23/21 1648)  Pulse: 99 (12/23/21 1648)  Resp: 19 (12/23/21 1648)  BP: 130/67 (12/23/21 1648)  SpO2: 95 % (12/23/21 1648) Vital Signs (24h Range):  Temp:  [97.7 °F (36.5 °C)-98.6 °F (37 °C)] 97.9 °F (36.6 °C)  Pulse:  [] 99  Resp:  [16-19] 19  SpO2:  [95 %-99 %] 95 %  BP: (103-169)/(56-88) 130/67     Weight: 83 kg (182 lb 15.7 oz)  Body mass index is 30.45 kg/m².    Intake/Output Summary (Last 24 hours) at 12/23/2021 1755  Last data filed at 12/23/2021 1612  Gross per 24 hour   Intake 273 ml   Output 1100 ml   Net -827 ml      Physical Exam  Vitals and nursing note reviewed.   Constitutional:       General: She is not in acute distress.     Appearance: Normal appearance.   HENT:      Head: Normocephalic and atraumatic.   Eyes:      Pupils: Pupils are equal, round, and reactive to light.   Cardiovascular:      Rate and Rhythm: Normal rate and regular rhythm.      Pulses: Normal pulses.      Heart sounds: Normal heart sounds.   Pulmonary:      Effort: Pulmonary effort is normal.   Abdominal:      General: Abdomen is flat.      Palpations: Abdomen is soft.   Musculoskeletal:         General: Tenderness present. No swelling.      Cervical back: Normal range of motion.   Skin:     General: Skin is warm and dry.      Findings: Lesion (right foot) present.   Neurological:      Mental Status: She is alert and oriented to person, place, and time.      Motor: Weakness  present.         Significant Labs:   All pertinent labs within the past 24 hours have been reviewed.  Blood Culture: No results for input(s): LABBLOO in the last 48 hours.  BMP:   Recent Labs   Lab 12/23/21  0604   GLU 85      K 4.0      CO2 22*   BUN 26*   CREATININE 1.3   CALCIUM 8.0*   MG 1.9     CBC:   Recent Labs   Lab 12/22/21  0809 12/23/21  0604   WBC 13.76* 11.68   HGB 7.7* 6.5*   HCT 25.8* 21.4*   * 656*     CMP:   Recent Labs   Lab 12/23/21  0604      K 4.0      CO2 22*   GLU 85   BUN 26*   CREATININE 1.3   CALCIUM 8.0*   ANIONGAP 10   EGFRNONAA 46*     Lactic Acid:   Recent Labs   Lab 12/21/21  2139   LACTATE 1.6       Significant Imaging: I have reviewed all pertinent imaging results/findings within the past 24 hours.

## 2021-12-24 LAB
ANION GAP SERPL CALC-SCNC: 12 MMOL/L (ref 8–16)
BASOPHILS # BLD AUTO: 0.08 K/UL (ref 0–0.2)
BASOPHILS NFR BLD: 0.5 % (ref 0–1.9)
BUN SERPL-MCNC: 21 MG/DL (ref 6–20)
CALCIUM SERPL-MCNC: 8.4 MG/DL (ref 8.7–10.5)
CHLORIDE SERPL-SCNC: 103 MMOL/L (ref 95–110)
CO2 SERPL-SCNC: 21 MMOL/L (ref 23–29)
CREAT SERPL-MCNC: 1.3 MG/DL (ref 0.5–1.4)
DIFFERENTIAL METHOD: ABNORMAL
EOSINOPHIL # BLD AUTO: 0.2 K/UL (ref 0–0.5)
EOSINOPHIL NFR BLD: 1.4 % (ref 0–8)
ERYTHROCYTE [DISTWIDTH] IN BLOOD BY AUTOMATED COUNT: 14.6 % (ref 11.5–14.5)
EST. GFR  (AFRICAN AMERICAN): 53 ML/MIN/1.73 M^2
EST. GFR  (NON AFRICAN AMERICAN): 46 ML/MIN/1.73 M^2
GLUCOSE SERPL-MCNC: 117 MG/DL (ref 70–110)
HCT VFR BLD AUTO: 26.7 % (ref 37–48.5)
HGB BLD-MCNC: 8.4 G/DL (ref 12–16)
IMM GRANULOCYTES # BLD AUTO: 0.12 K/UL (ref 0–0.04)
IMM GRANULOCYTES NFR BLD AUTO: 0.7 % (ref 0–0.5)
LYMPHOCYTES # BLD AUTO: 1.6 K/UL (ref 1–4.8)
LYMPHOCYTES NFR BLD: 10.2 % (ref 18–48)
MAGNESIUM SERPL-MCNC: 1.9 MG/DL (ref 1.6–2.6)
MCH RBC QN AUTO: 30.1 PG (ref 27–31)
MCHC RBC AUTO-ENTMCNC: 31.5 G/DL (ref 32–36)
MCV RBC AUTO: 96 FL (ref 82–98)
MONOCYTES # BLD AUTO: 1.4 K/UL (ref 0.3–1)
MONOCYTES NFR BLD: 8.8 % (ref 4–15)
NEUTROPHILS # BLD AUTO: 12.6 K/UL (ref 1.8–7.7)
NEUTROPHILS NFR BLD: 78.4 % (ref 38–73)
NRBC BLD-RTO: 0 /100 WBC
PLATELET # BLD AUTO: 678 K/UL (ref 150–450)
PMV BLD AUTO: 9.7 FL (ref 9.2–12.9)
POCT GLUCOSE: 112 MG/DL (ref 70–110)
POCT GLUCOSE: 119 MG/DL (ref 70–110)
POTASSIUM SERPL-SCNC: 4 MMOL/L (ref 3.5–5.1)
RBC # BLD AUTO: 2.79 M/UL (ref 4–5.4)
SODIUM SERPL-SCNC: 136 MMOL/L (ref 136–145)
VANCOMYCIN TROUGH SERPL-MCNC: 31.2 UG/ML (ref 10–22)
WBC # BLD AUTO: 16.08 K/UL (ref 3.9–12.7)

## 2021-12-24 PROCEDURE — 83735 ASSAY OF MAGNESIUM: CPT | Performed by: NURSE PRACTITIONER

## 2021-12-24 PROCEDURE — 80202 ASSAY OF VANCOMYCIN: CPT | Performed by: INTERNAL MEDICINE

## 2021-12-24 PROCEDURE — 36415 COLL VENOUS BLD VENIPUNCTURE: CPT | Performed by: NURSE PRACTITIONER

## 2021-12-24 PROCEDURE — 80048 BASIC METABOLIC PNL TOTAL CA: CPT | Performed by: NURSE PRACTITIONER

## 2021-12-24 PROCEDURE — 63600175 PHARM REV CODE 636 W HCPCS: Performed by: NURSE PRACTITIONER

## 2021-12-24 PROCEDURE — 25000003 PHARM REV CODE 250: Performed by: NURSE PRACTITIONER

## 2021-12-24 PROCEDURE — 94761 N-INVAS EAR/PLS OXIMETRY MLT: CPT

## 2021-12-24 PROCEDURE — 85025 COMPLETE CBC W/AUTO DIFF WBC: CPT | Performed by: NURSE PRACTITIONER

## 2021-12-24 PROCEDURE — C9399 UNCLASSIFIED DRUGS OR BIOLOG: HCPCS | Performed by: NURSE PRACTITIONER

## 2021-12-24 PROCEDURE — 36415 COLL VENOUS BLD VENIPUNCTURE: CPT | Performed by: INTERNAL MEDICINE

## 2021-12-24 PROCEDURE — 27000207 HC ISOLATION

## 2021-12-24 PROCEDURE — 11000001 HC ACUTE MED/SURG PRIVATE ROOM

## 2021-12-24 RX ADMIN — OXYCODONE AND ACETAMINOPHEN 1 TABLET: 7.5; 325 TABLET ORAL at 08:12

## 2021-12-24 RX ADMIN — METRONIDAZOLE 500 MG: 500 TABLET ORAL at 10:12

## 2021-12-24 RX ADMIN — CEFEPIME HYDROCHLORIDE 1 G: 1 INJECTION, SOLUTION INTRAVENOUS at 08:12

## 2021-12-24 RX ADMIN — METRONIDAZOLE 500 MG: 500 TABLET ORAL at 03:12

## 2021-12-24 RX ADMIN — AMLODIPINE BESYLATE 5 MG: 5 TABLET ORAL at 08:12

## 2021-12-24 RX ADMIN — ALLOPURINOL 100 MG: 100 TABLET ORAL at 08:12

## 2021-12-24 RX ADMIN — MELATONIN TAB 3 MG 6 MG: 3 TAB at 08:12

## 2021-12-24 RX ADMIN — PREGABALIN 50 MG: 50 CAPSULE ORAL at 08:12

## 2021-12-24 RX ADMIN — FUROSEMIDE 40 MG: 40 TABLET ORAL at 08:12

## 2021-12-24 RX ADMIN — ESCITALOPRAM OXALATE 10 MG: 10 TABLET ORAL at 08:12

## 2021-12-24 RX ADMIN — INSULIN DETEMIR 5 UNITS: 100 INJECTION, SOLUTION SUBCUTANEOUS at 08:12

## 2021-12-24 RX ADMIN — OXYCODONE AND ACETAMINOPHEN 1 TABLET: 7.5; 325 TABLET ORAL at 03:12

## 2021-12-24 RX ADMIN — METRONIDAZOLE 500 MG: 500 TABLET ORAL at 06:12

## 2021-12-24 RX ADMIN — PREGABALIN 50 MG: 50 CAPSULE ORAL at 03:12

## 2021-12-24 RX ADMIN — ATORVASTATIN CALCIUM 80 MG: 40 TABLET, FILM COATED ORAL at 08:12

## 2021-12-24 RX ADMIN — MIRTAZAPINE 7.5 MG: 7.5 TABLET ORAL at 08:12

## 2021-12-24 NOTE — NURSING
Patient awake and oriented x4. Patient complained of pain, meds given per order. Currently on room air. Patient is hooked to a cardiac monitor. 1 Unit of blood given with no incident. Fall risk precaution maintained; bed lowered, call light usage taught and encourage to call for help. Care plan explained to patient and responded with verbal understanding. Will continue to monitor.

## 2021-12-24 NOTE — NURSING
Patient awake and oriented x4. Patient complained of pain, meds given per order. Currently on room air. Patient is hooked to a cardiac monitor. Fall risk precaution maintained; bed lowered, call light usage taught and encourage to call for help. Care plan explained to patient and responded with verbal understanding. Will continue to monitor.

## 2021-12-24 NOTE — PROGRESS NOTES
Pharmacokinetic Assessment Follow Up: IV Vancomycin    Vancomycin serum concentration assessment(s):    The trough level was drawn incorrectly and cannot be used to guide therapy at this time.    Vancomycin Regimen Plan:    Continue regimen to Vancomycin 1250 mg IV every 24 hours with next serum trough concentration measured at 12/24/21 prior to 4 dose on 2200    Drug levels (last 3 results):  Recent Labs   Lab Result Units 12/23/21  2232   Vancomycin-Trough ug/mL 32.1*       Pharmacy will continue to follow and monitor vancomycin.    Please contact pharmacy at extension [phone] for questions regarding this assessment.    Thank you for the consult,   Mc Mtz       Patient brief summary:  Suyapa Connelly is a 56 y.o. female initiated on antimicrobial therapy with IV Vancomycin for treatment of bone/joint infection    The patient's current regimen is vanco 1250mg q24    Drug Allergies:   Review of patient's allergies indicates:   Allergen Reactions    Ciprofloxacin Itching    Contrast media      Kidney injury    Iodine      Kidney injury       Actual Body Weight:   83kg    Renal Function:   Estimated Creatinine Clearance: 52 mL/min (based on SCr of 1.3 mg/dL).,     Dialysis Method (if applicable):  N/A    CBC (last 72 hours):  Recent Labs   Lab Result Units 12/21/21  1702 12/22/21  0809 12/23/21  0604   WBC K/uL 15.29* 13.76* 11.68   Hemoglobin g/dL 7.4* 7.7* 6.5*   Hematocrit % 24.2* 25.8* 21.4*   Platelets K/uL 787* 611* 656*   Gran % % 75.6* 78.7* 72.1   Lymph % % 13.8* 10.8* 15.8*   Mono % % 7.8 8.0 9.1   Eosinophil % % 1.4 1.2 1.8   Basophil % % 0.5 0.6 0.6   Differential Method  Automated Automated Automated       Metabolic Panel (last 72 hours):  Recent Labs   Lab Result Units 12/21/21  1702 12/21/21  2204 12/23/21  0604   Sodium mmol/L 140  --  138   Potassium mmol/L 4.0  --  4.0   Chloride mmol/L 105  --  106   CO2 mmol/L 24  --  22*   Glucose mg/dL 171*  --  85   Glucose, UA   --  1+*  --    BUN mg/dL  33*  --  26*   Creatinine mg/dL 1.6*  --  1.3   Albumin g/dL 1.6*  --   --    Total Bilirubin mg/dL 0.2  --   --    Alkaline Phosphatase U/L 132  --   --    AST U/L 12  --   --    ALT U/L 7*  --   --    Magnesium mg/dL 2.1  --  1.9       Vancomycin Administrations:  vancomycin given in the last 96 hours                     vancomycin 1.25 g in dextrose 5% 250 mL IVPB (ready to mix) (mg) 1,250 mg New Bag 12/23/21 2204     1,250 mg New Bag 12/22/21 2236    vancomycin (VANCOCIN) 2,000 mg in dextrose 5 % 500 mL IVPB (mg) 2,000 mg New Bag 12/21/21 2319                    Microbiologic Results:  Microbiology Results (last 7 days)       Procedure Component Value Units Date/Time    Urine culture [[id number]] Collected: 12/21/21 2204    Order Status: Completed Specimen: Urine Updated: 12/23/21 0844     Urine Culture, Routine No growth    Narrative:      Specimen Source->Urine

## 2021-12-24 NOTE — SUBJECTIVE & OBJECTIVE
Interval History: No new issues, no acute events overnight, IV abx, epdning sx eval    Review of Systems   Constitutional: Negative for chills and fever.   Respiratory: Negative for cough and shortness of breath.    Cardiovascular: Negative for chest pain and leg swelling.   Gastrointestinal: Negative for abdominal pain and nausea.   Musculoskeletal: Positive for arthralgias and myalgias.        Right foot pain   Skin: Positive for wound.   Neurological: Positive for weakness.     Objective:     Vital Signs (Most Recent):  Temp: 97.7 °F (36.5 °C) (12/24/21 1119)  Pulse: 97 (12/24/21 1154)  Resp: 19 (12/24/21 1119)  BP: 137/66 (12/24/21 1119)  SpO2: 98 % (12/24/21 1119) Vital Signs (24h Range):  Temp:  [97.7 °F (36.5 °C)-98.9 °F (37.2 °C)] 97.7 °F (36.5 °C)  Pulse:  [] 97  Resp:  [16-19] 19  SpO2:  [95 %-99 %] 98 %  BP: (128-169)/(66-81) 137/66     Weight: 83 kg (182 lb 15.7 oz)  Body mass index is 30.45 kg/m².    Intake/Output Summary (Last 24 hours) at 12/24/2021 1335  Last data filed at 12/24/2021 0438  Gross per 24 hour   Intake 700.82 ml   Output 400 ml   Net 300.82 ml      Physical Exam  Vitals and nursing note reviewed.   Constitutional:       General: She is not in acute distress.     Appearance: Normal appearance.   HENT:      Head: Normocephalic and atraumatic.   Eyes:      Pupils: Pupils are equal, round, and reactive to light.   Cardiovascular:      Rate and Rhythm: Normal rate and regular rhythm.      Pulses: Normal pulses.      Heart sounds: Normal heart sounds.   Pulmonary:      Effort: Pulmonary effort is normal.   Abdominal:      General: Abdomen is flat.      Palpations: Abdomen is soft.   Musculoskeletal:         General: Tenderness present. No swelling.      Cervical back: Normal range of motion.   Skin:     General: Skin is warm and dry.      Findings: Lesion (right foot) present.   Neurological:      Mental Status: She is alert and oriented to person, place, and time.      Motor: Weakness  present.         Significant Labs:   All pertinent labs within the past 24 hours have been reviewed.  Blood Culture: No results for input(s): LABBLOO in the last 48 hours.  BMP:   Recent Labs   Lab 12/24/21  0633   *      K 4.0      CO2 21*   BUN 21*   CREATININE 1.3   CALCIUM 8.4*   MG 1.9     CBC:   Recent Labs   Lab 12/23/21  0604 12/24/21  0633   WBC 11.68 16.08*   HGB 6.5* 8.4*   HCT 21.4* 26.7*   * 678*     CMP:   Recent Labs   Lab 12/23/21  0604 12/24/21  0633    136   K 4.0 4.0    103   CO2 22* 21*   GLU 85 117*   BUN 26* 21*   CREATININE 1.3 1.3   CALCIUM 8.0* 8.4*   ANIONGAP 10 12   EGFRNONAA 46* 46*     Lactic Acid:   No results for input(s): LACTATE in the last 48 hours.    Significant Imaging: I have reviewed all pertinent imaging results/findings within the past 24 hours.

## 2021-12-24 NOTE — PLAN OF CARE
Problem: Adult Inpatient Plan of Care  Goal: Plan of Care Review  Outcome: Ongoing, Progressing   Patient is alert, oriented X4. Care plan explained to patient, she verbalized understanding.     On room air, O2 saturation maintain 98%, no respiratory distress noted. On cardiac monitor, running normal sinus rhythm.     Patient reported felt little bit nauseated, refused Zofran. Deny vomiting/diarrhea. Complaint right heel pain, rate 8/10. PRN percocet given. Due medications given.     Maintain fall risk precaution, bed in lowest position, bed alarm on. Call light/personal items in reach. Instructed patient call for help as needed. Will continue to monitor.

## 2021-12-24 NOTE — PROGRESS NOTES
[facility] Medicine  Progress Note    Patient Name: Suyapa Connelly  MRN: [id number]  Patient Class: IP- Inpatient   Admission Date: 12/21/2021  Length of Stay: 3 days  Attending Physician: Obinna Marr DO  Primary Care Provider: Magen Christensen MD        Subjective:     Principal Problem:Osteomyelitis of right foot        HPI:  Suyapa Connelly is a 55 yo female with a pmh of DM2, PVD, osteomyelitis, right AKA, CKD3, CAD s/p CABG, afib on eliquis. She presented with right foot pain x 1 month. She has an ulcer to her right heel. Presented with dressing to right heel which was removed and had purulent foul smelling drainage noted. The dressing was not changed in 2 months since she last saw Dr. Askew on 10/22/21 for arthrectomy, thrombectomy, and PTA with DCB to right lower extremity. Had not gone to follow up appts. She now has what appears to be dry gangrene to right toes with bone exposure. She has not been compliant with medications and states she had not taken eliquis in over a month. She states she has not been able to stand due to right leg weakness since her left AKA in May 2021. She also has a pressure wound to the lateral right lower leg. She takes her 's percocet for pain, which was helping some.       Overview/Hospital Course:  No notes on file    Interval History: No new issues, no acute events overnight, IV abx, epdning sx eval    Review of Systems   Constitutional: Negative for chills and fever.   Respiratory: Negative for cough and shortness of breath.    Cardiovascular: Negative for chest pain and leg swelling.   Gastrointestinal: Negative for abdominal pain and nausea.   Musculoskeletal: Positive for arthralgias and myalgias.        Right foot pain   Skin: Positive for wound.   Neurological: Positive for weakness.     Objective:     Vital Signs (Most Recent):  Temp: 97.7 °F (36.5 °C) (12/24/21 1119)  Pulse: 97 (12/24/21 1154)  Resp: 19 (12/24/21 1119)  BP: 137/66  (12/24/21 1119)  SpO2: 98 % (12/24/21 1119) Vital Signs (24h Range):  Temp:  [97.7 °F (36.5 °C)-98.9 °F (37.2 °C)] 97.7 °F (36.5 °C)  Pulse:  [] 97  Resp:  [16-19] 19  SpO2:  [95 %-99 %] 98 %  BP: (128-169)/(66-81) 137/66     Weight: 83 kg (182 lb 15.7 oz)  Body mass index is 30.45 kg/m².    Intake/Output Summary (Last 24 hours) at 12/24/2021 1335  Last data filed at 12/24/2021 0438  Gross per 24 hour   Intake 700.82 ml   Output 400 ml   Net 300.82 ml      Physical Exam  Vitals and nursing note reviewed.   Constitutional:       General: She is not in acute distress.     Appearance: Normal appearance.   HENT:      Head: Normocephalic and atraumatic.   Eyes:      Pupils: Pupils are equal, round, and reactive to light.   Cardiovascular:      Rate and Rhythm: Normal rate and regular rhythm.      Pulses: Normal pulses.      Heart sounds: Normal heart sounds.   Pulmonary:      Effort: Pulmonary effort is normal.   Abdominal:      General: Abdomen is flat.      Palpations: Abdomen is soft.   Musculoskeletal:         General: Tenderness present. No swelling.      Cervical back: Normal range of motion.   Skin:     General: Skin is warm and dry.      Findings: Lesion (right foot) present.   Neurological:      Mental Status: She is alert and oriented to person, place, and time.      Motor: Weakness present.         Significant Labs:   All pertinent labs within the past 24 hours have been reviewed.  Blood Culture: No results for input(s): LABBLOO in the last 48 hours.  BMP:   Recent Labs   Lab 12/24/21  0633   *      K 4.0      CO2 21*   BUN 21*   CREATININE 1.3   CALCIUM 8.4*   MG 1.9     CBC:   Recent Labs   Lab 12/23/21  0604 12/24/21  0633   WBC 11.68 16.08*   HGB 6.5* 8.4*   HCT 21.4* 26.7*   * 678*     CMP:   Recent Labs   Lab 12/23/21  0604 12/24/21  0633    136   K 4.0 4.0    103   CO2 22* 21*   GLU 85 117*   BUN 26* 21*   CREATININE 1.3 1.3   CALCIUM 8.0* 8.4*   ANIONGAP 10  12   EGFRNONAA 46* 46*     Lactic Acid:   No results for input(s): LACTATE in the last 48 hours.    Significant Imaging: I have reviewed all pertinent imaging results/findings within the past 24 hours.      Assessment/Plan:      * Osteomyelitis of right foot  - Non-healing wounds noted with purulent drainage to heal  - Gangrenous changes to toes, exposed bone noted  - Cont vanc, cefepime, flagyl  - MRI of right foot confirms osteo  - Podiatry assessed, consutled gen sx for BKA  - ID consulted  - NPO past midnight for possible intervention  - Hold eliquis  - Percocet PRN pain        Diabetic ulcer of right foot associated with type 2 diabetes mellitus  - See osteo      Cellulitis  - See osteo      Stage 3 chronic kidney disease due to type 2 diabetes mellitus  - At baseline  - Renally dose meds      Chronic diastolic heart failure  - Reports taking lasix daily for week prior admission  - Cont lasix daily      Chronic anemia  - Hgb drop from 13 to 7 over 2 month period  - Type and screen  - Hold plavix and eliquis (had not been taking)  - Denies acute blood loss  - FOBT pending, check with next BM      Critical lower limb ischemia  - Had intervention to RLE on 10/22/21  - Persistent non-healing diabetic right foot wounds   - Arterial US showing stenosis   - Cardiology following  - NPO past midnight, allow diet today  - Had not been taking eliquis, cont to hold for possible intervention                Paroxysmal atrial fibrillation  - NSR on tele  - Noncompliant with eliquis  - Cont to hold for possible RLE intervention      Type 2 diabetes mellitus with hyperglycemia, with long-term current use of insulin  - Hold home meds  - Detemir 5u nightly  - Accuchecks and ISS  - A1C 9.6        Essential hypertension  - Started amlodipine 5mg daily  - Monitor pressures      Gangrene  - Dry gangrene right foot  - Podiatry assessed  - Gen surgery consulted for BKA        VTE Risk Mitigation (From admission, onward)         Ordered      Reason for No Pharmacological VTE Prophylaxis  Once        Question:  Reasons:  Answer:  Already adequately anticoagulated on oral Anticoagulants    12/21/21 1910     IP VTE HIGH RISK PATIENT  Once         12/21/21 1910                Discharge Planning   DEVAN:      Code Status: Full Code   Is the patient medically ready for discharge?:     Reason for patient still in hospital (select all that apply): Treatment  Discharge Plan A: Rehab                  Obinna Marr DO  Department of [facility] Medicine   [facility]

## 2021-12-25 LAB
POCT GLUCOSE: 129 MG/DL (ref 70–110)
POCT GLUCOSE: 136 MG/DL (ref 70–110)
POCT GLUCOSE: 157 MG/DL (ref 70–110)
POCT GLUCOSE: 187 MG/DL (ref 70–110)
POCT GLUCOSE: 201 MG/DL (ref 70–110)
VANCOMYCIN SERPL-MCNC: 20.4 UG/ML

## 2021-12-25 PROCEDURE — 80202 ASSAY OF VANCOMYCIN: CPT | Performed by: INTERNAL MEDICINE

## 2021-12-25 PROCEDURE — 11000001 HC ACUTE MED/SURG PRIVATE ROOM

## 2021-12-25 PROCEDURE — 94761 N-INVAS EAR/PLS OXIMETRY MLT: CPT

## 2021-12-25 PROCEDURE — 36415 COLL VENOUS BLD VENIPUNCTURE: CPT | Performed by: INTERNAL MEDICINE

## 2021-12-25 PROCEDURE — 25000003 PHARM REV CODE 250: Performed by: NURSE PRACTITIONER

## 2021-12-25 PROCEDURE — 63600175 PHARM REV CODE 636 W HCPCS: Performed by: NURSE PRACTITIONER

## 2021-12-25 PROCEDURE — 27000207 HC ISOLATION

## 2021-12-25 PROCEDURE — C9399 UNCLASSIFIED DRUGS OR BIOLOG: HCPCS | Performed by: NURSE PRACTITIONER

## 2021-12-25 RX ADMIN — OXYCODONE AND ACETAMINOPHEN 1 TABLET: 7.5; 325 TABLET ORAL at 11:12

## 2021-12-25 RX ADMIN — ESCITALOPRAM OXALATE 10 MG: 10 TABLET ORAL at 08:12

## 2021-12-25 RX ADMIN — MELATONIN TAB 3 MG 6 MG: 3 TAB at 08:12

## 2021-12-25 RX ADMIN — PREGABALIN 50 MG: 50 CAPSULE ORAL at 08:12

## 2021-12-25 RX ADMIN — MIRTAZAPINE 7.5 MG: 7.5 TABLET ORAL at 08:12

## 2021-12-25 RX ADMIN — ATORVASTATIN CALCIUM 80 MG: 40 TABLET, FILM COATED ORAL at 08:12

## 2021-12-25 RX ADMIN — METRONIDAZOLE 500 MG: 500 TABLET ORAL at 02:12

## 2021-12-25 RX ADMIN — ALLOPURINOL 100 MG: 100 TABLET ORAL at 08:12

## 2021-12-25 RX ADMIN — METRONIDAZOLE 500 MG: 500 TABLET ORAL at 11:12

## 2021-12-25 RX ADMIN — INSULIN DETEMIR 5 UNITS: 100 INJECTION, SOLUTION SUBCUTANEOUS at 08:12

## 2021-12-25 RX ADMIN — INSULIN ASPART 2 UNITS: 100 INJECTION, SOLUTION INTRAVENOUS; SUBCUTANEOUS at 11:12

## 2021-12-25 RX ADMIN — OXYCODONE AND ACETAMINOPHEN 1 TABLET: 7.5; 325 TABLET ORAL at 05:12

## 2021-12-25 RX ADMIN — ACETAMINOPHEN 650 MG: 325 TABLET ORAL at 08:12

## 2021-12-25 RX ADMIN — METRONIDAZOLE 500 MG: 500 TABLET ORAL at 05:12

## 2021-12-25 RX ADMIN — CEFEPIME HYDROCHLORIDE 1 G: 1 INJECTION, SOLUTION INTRAVENOUS at 08:12

## 2021-12-25 RX ADMIN — AMLODIPINE BESYLATE 5 MG: 5 TABLET ORAL at 08:12

## 2021-12-25 RX ADMIN — FUROSEMIDE 40 MG: 40 TABLET ORAL at 08:12

## 2021-12-25 RX ADMIN — PREGABALIN 50 MG: 50 CAPSULE ORAL at 02:12

## 2021-12-25 NOTE — PLAN OF CARE
VN note:   Patient's chart, labs and vital signs reviewed, will be available to intervene if needed.

## 2021-12-25 NOTE — ASSESSMENT & PLAN NOTE
- Hgb drop from 13 to 7 over 2 month period  - Type and screen  - Hold plavix and eliquis (had not been taking)  - Denies acute blood loss  - sp 1 U PRBC 12/23

## 2021-12-25 NOTE — CONSULTS
Today`s Date: 12/25/2021     Admit Date: 12/21/2021    Admitting Physician: Obinna Marr DO    Patient`s Name: Suyapa Connelly , 56 y.o. female    Reason for consultation  Evaluate for amputation    Patient Active Problem List:     Gangrene     Peripheral vascular disease     PAD (peripheral artery disease)     Moderate malnutrition     Essential hypertension     Vitamin D deficiency     CAROLIN (generalized anxiety disorder)     MDD (major depressive disorder), recurrent episode, moderate     Acute on chronic diastolic CHF (congestive heart failure)     Mixed hyperlipidemia     Type 2 diabetes mellitus with hyperglycemia, with long-term current use of insulin     Coronary atherosclerosis     S/P CABG x 1     Paroxysmal atrial fibrillation     Stage 3 chronic kidney disease     Critical lower limb ischemia     S/P femoral-popliteal bypass surgery     Uncontrolled type 2 diabetes mellitus with hyperglycemia     Type 2 diabetes mellitus with diabetic nephropathy, with long-term current use of insulin     Chronic anemia     Toe ulcer, left, with fat layer exposed     Thrombosis of femoro-popliteal bypass graft     Impaired functional mobility and endurance     Nephrotic syndrome     Ulcer of left foot with necrosis of bone     Ulcer of left foot     Uremia     Class 2 severe obesity due to excess calories with serious comorbidity in adult     Vascular graft infection     Hypoalbuminemia     Hypomagnesemia     Postmenopausal bleeding     Uterine fibroid     Diabetic ulcer of left foot associated with type 2 diabetes mellitus, with fat layer exposed     Diabetic ulcer of left lower leg with fat layer exposed     Muscle wasting and atrophy, not elsewhere classified, right thigh      Adjustment disorder with mixed anxiety and depressed mood     Amputation above knee     Phantom limb syndrome with pain     Chronic diastolic heart failure     Acute osteomyelitis of calcaneum     Acute-on-chronic renal failure     Stage 3  chronic kidney disease due to type 2 diabetes mellitus     Status post above knee amputation, left     Peripheral neuropathic pain     Impaired eye movement     Cellulitis     Diabetic ulcer of right foot associated with type 2 diabetes mellitus     Osteomyelitis of right foot      Past Medical History:  No date: Anxiety  No date: Chronic pain syndrome  No date: CKD (chronic kidney disease), stage III  No date: Depression  No date: Diabetes mellitus      Comment:  type 2  No date: Diabetes mellitus, type 2  No date: GERD (gastroesophageal reflux disease)  3/23/2021: Hyperemesis  No date: Hyperlipemia  No date: Hypertension  3/23/2021: Hypokalemia  2010: Myocardial infarction      Comment:  minor-caused by stress per pt.  No date: Osteomyelitis  4/30/2021: Osteomyelitis of left foot  No date: PVD (peripheral vascular disease)  No date: Vaginal delivery      Comment:  x1    Past Surgical History:  5/18/2021: ABOVE-KNEE AMPUTATION; Left      Comment:  Procedure: AMPUTATION, ABOVE KNEE;  Surgeon: Teddy Huber MD;  Location: [facility] OR [address] [place] [address];  Service:                Vascular;  Laterality: Left;  7/9/15: Angiogram - Right Extremity; Right  10/6/15: angiogram-left leg  4/29/2021: ANGIOGRAPHY OF LOWER EXTREMITY; Left      Comment:  Procedure: ANGIOGRAM, LOWER EXTREMITY;  Surgeon: Teddy Huber MD;  Location: [facility] OR [address] [place] [address];  Service:                Vascular;  Laterality: Left;  12/18/2019: CATHETERIZATION OF BOTH LEFT AND RIGHT HEART; N/A      Comment:  Procedure: CATHETERIZATION, HEART, BOTH LEFT AND RIGHT;                Surgeon: Que Fernando III, MD;  Location: [facility] CATH               LAB;  Service: Cardiology;  Laterality: N/A;  12/18/2019: CORONARY ANGIOGRAPHY; N/A      Comment:  Procedure: ANGIOGRAM, CORONARY ARTERY;  Surgeon: Que Fernando III, MD;  Location: [facility] CATH LAB;  Service:                Cardiology;  Laterality: N/A;  7/28/2020:  CORONARY ANGIOGRAPHY INCLUDING BYPASS GRAFTS WITH   CATHETERIZATION OF LEFT HEART; N/A      Comment:  Procedure: ANGIOGRAM, CORONARY, INCLUDING BYPASS GRAFT,                WITH LEFT HEART CATHETERIZATION, 9 am;  Surgeon: Rachel Easley MD;  Location: [facility] CATH LAB;  Service:                Cardiology;  Laterality: N/A;  1/14/2020: CORONARY ARTERY BYPASS GRAFT (CABG); N/A      Comment:  Procedure: CORONARY ARTERY BYPASS GRAFT (CABG) x 1                    Off Pump;  Surgeon: Huang Altamirano MD;  Location:                [facility] OR [address] [place] [address];  Service: Cardiovascular;  Laterality:                N/A;  4/29/2021: CREATION OF FEMORAL-TIBIAL ARTERY BYPASS; Left      Comment:  Procedure: CREATION, BYPASS, ARTERIAL, FEMORAL TO                ANTERIOR TIBIAL;  Surgeon: Teddy Huber MD;                 Location: [facility] OR [address] [place] [address];  Service: Vascular;                 Laterality: Left;  8/18/2020: CREATION OF FEMOROPOPLITEAL ARTERIAL BYPASS USING GRAFT;   Left      Comment:  Procedure: CREATION, BYPASS, ARTERIAL, FEMORAL TO                POPLITEAL, USING GRAFT, LEFT LOWER EXTREMITY;  Surgeon:                Teddy Huber MD;  Location: [facility];  Service:                Vascular;  Laterality: Left;  REQUEST 7:15 A.M.                START----COVID NEGATIVE ON 8/171ST CASE STARTE PER                LEANA ON 8/7/2020 @ [facility] PREOP 8/12/2020                  T/S-----CLEARED BY CARDS-------PENDING INSURANCE  9/8/2020: DEBRIDEMENT OF FOOT; Left      Comment:  Procedure: DEBRIDEMENT, FOOT;  Surgeon: Rosio Mayes DPM;  Location: [facility];  Service: Podiatry;  Laterality:               Left;  request neoxx . RN Pre Op 9-4-2020, Covid                negative on 9/5/20. C A  3/4/2021: DEBRIDEMENT OF FOOT      Comment:  Procedure: DEBRIDEMENT, FOOT;  Surgeon: Teddy Huber MD;  Location: [facility] OR;  Service: Vascular;;  3/9/2021: DEBRIDEMENT OF FOOT; Left       Comment:  Procedure: DEBRIDEMENT, FOOT, bone biopsy;  Surgeon:                Rosio Mayes DPM;  Location: [facility] OR;  Service:                Podiatry;  Laterality: Left;  Request neoxx---COVID IN                AMREQUESTING NOON STARTRN Phone Pre op.On Blood                thinners Plavix and Eliquis.  Covid am of surgery. C A  5/4/2021: DEBRIDEMENT OF FOOT; Left      Comment:  Procedure: DEBRIDEMENT, FOOT;  Surgeon: Farooq Morley DPM;  [facility];  Service:                Podiatry;  Laterality: Left;  1/27/2020: INSERTION OF TUNNELED CENTRAL VENOUS HEMODIALYSIS   CATHETER; N/A      Comment:  Procedure: Insertion, Catheter, Central Venous,                Hemodialysis;  Surgeon: ESTEBAN Gomez III, MD;                 Location: [facility] CATH LAB;  Service: Peripheral Vascular;                 Laterality: N/A;  3/4/2021: PERCUTANEOUS TRANSLUMINAL ANGIOPLASTY; N/A      Comment:  Procedure: PTA (ANGIOPLASTY, PERCUTANEOUS,                TRANSLUMINAL);  Surgeon: Teddy Huber MD;                 Location: [facility] OR;  Service: Vascular;  Laterality: N/A;  5/27/2021: REMOVAL OF ARTERIOVENOUS GRAFT; Left      Comment:  Procedure: REMOVAL, GRAFT, LEFT LOWER EXTREMITY, WOUND                EXPLORATION;  Surgeon: Teddy Huber MD;                 [facility];  Service: Vascular;                 Laterality: Left;  3/9/2021: REMOVAL OF NAIL OF DIGIT; Left      Comment:  Procedure: REMOVAL, NAIL, DIGIT;  Surgeon: Rosio Mayes DPM;  Location: [facility] OR;  Service: Podiatry;                 Laterality: Left;  3/4/2021: THROMBECTOMY; Left      Comment:  Procedure: THROMBECTOMY, LEFT LOWER EXTREMITY BYPASS                GRAFT, ANGIOGRAM, POSSIBLE INTERVENTION, POSSIBLE LEFT                LOWER EXTREMITY BYPASS;  Surgeon: Teddy Huber MD;  Location: [facility] OR;  Service: Vascular;  Laterality:                Left;  4/29/2021:  THROMBECTOMY; Left      Comment:  Procedure: GRAFT THROMBECTOMY, LEFT LOWER EXTREMITY;                 Surgeon: Teddy Huber MD;  [facility];  Service: Vascular;  Laterality: Left;  14.5                [id number] [id number] ml dye  10/22/2021: THROMBECTOMY      Comment:  Procedure: THROMBECTOMY;  Surgeon: Saad Arenas MD;                 Location: [facility] CATH LAB/EP;  Service: Cardiology;;    Prior to Admission medications :  Medication acetaminophen (TYLENOL) 500 MG tablet, Sig Take 2 tablets (1,000 mg total) by mouth every 8 (eight) hours as needed for Pain., Start Date 6/25/21, End Date , Taking? Yes, Authorizing Provider Ruma Schulte MD    Medication allopurinoL (ZYLOPRIM) 100 MG tablet, Sig Take 1 tablet (100 mg total) by mouth once daily., Start Date 7/31/20, End Date , Taking? Yes, Authorizing Provider Hitesh Mason MD    Medication atorvastatin (LIPITOR) 80 MG tablet, Sig Take 1 tablet (80 mg total) by mouth every evening., Start Date 6/8/21, End Date 6/8/22, Taking? Yes, Authorizing Provider NONI Hanna MD    Medication clopidogreL (PLAVIX) 75 mg tablet, Sig Take 1 tablet (75 mg total) by mouth once daily., Start Date 10/22/21, End Date , Taking? Yes, Authorizing Provider Saad Arenas MD    Medication EScitalopram oxalate (LEXAPRO) 10 MG tablet, Sig Take 1 tablet (10 mg total) by mouth once daily., Start Date 6/9/21, End Date 6/9/22, Taking? Yes, Authorizing Provider NONI Hanna MD    Medication furosemide (LASIX) 40 MG tablet, Sig Take 40 mg by mouth 2 (two) times daily., Start Date , End Date , Taking? Yes, Authorizing Provider Historical Provider    Medication insulin aspart U-100 (NOVOLOG FLEXPEN U-100 INSULIN) 100 unit/mL (3 mL) InPn pen, Sig Inject 5 Units into the skin 3 (three) times daily with meals. If not eating, ok to hold, Start Date 4/13/21, End Date , Taking? Yes, Authorizing Provider Maria Del Rosario Moreira NP    Medication  insulin detemir U-100 (LEVEMIR FLEXTOUCH) 100 unit/mL (3 mL) SubQ InPn pen, Sig Inject 8 Units into the skin once daily.Patient taking differently: Inject 10 Units into the skin every evening., Start Date 6/9/21, End Date 6/9/22, Taking? Yes, Authorizing Provider NONI Hanna MD    Medication pregabalin (LYRICA) 50 MG capsule, Sig Take 1 capsule (50 mg total) by mouth 3 (three) times daily., Start Date 12/3/21, End Date 6/3/22, Taking? Yes, Authorizing Provider Fawad Jameson MD    Medication sodium bicarbonate 650 MG tablet, Sig Take 1 tablet (650 mg total) by mouth 2 (two) times daily., Start Date 7/1/21, End Date 7/1/22, Taking? Yes, Authorizing Provider Ruma Schulte MD    Medication alogliptin (NESINA) 25 mg Tab, Sig Take 1 tablet (25 mg) by mouth once daily., Start Date 6/8/21, End Date , Taking? , Authorizing Provider NONI Hanna MD    Medication apixaban (ELIQUIS) 5 mg Tab, Sig Take 1 tablet (5 mg total) by mouth 2 (two) times daily., Start Date 8/23/21, End Date , Taking? , Authorizing Provider Saad Arenas MD    Medication blood-glucose meter,continuous (DEXCOM G6 ) Misc, Sig Use to check blood sugars 4 times/day, Start Date 12/3/21, End Date , Taking? , Authorizing Provider Fawad Jameson MD    Medication blood-glucose sensor (DEXCOM G6 SENSOR) Radha, Sig Apply one sensor to skin every 10 days, Start Date 12/3/21, End Date , Taking? , Authorizing Provider Fawad Jameson MD    Medication blood-glucose transmitter (DEXCOM G6 TRANSMITTER) Radha, Sig Replace transmitter every 3 months to use with dexcom sensor, Start Date 12/3/21, End Date , Taking? , Authorizing Provider Fawad Jameson MD    Medication collagenase (SANTYL) ointment, Sig Apply topically once daily. Nursing to cleanse L groin and 2 areas to left AKA incision site with sterile normal saline,  then apply a thin layer of Santyl ointment to affected area daily. Cover with bordered foam island mepore dressing.Patient  not taking: Reported on 12/21/2021, Start Date 6/26/21, End Date , Taking? , Authorizing Provider Ruma Schulte MD    Medication magnesium oxide (MAG-OX) 400 mg (241.3 mg magnesium) tablet, Sig Take 1 tablet (400 mg total) by mouth 2 (two) times daily.Patient not taking: No sig reported, Start Date 6/25/21, End Date , Taking? , Authorizing Provider Ruma Schulte MD    Medication melatonin (MELATIN) 3 mg tablet, Sig Take 2 tablets (6 mg total) by mouth nightly as needed for Insomnia.Patient not taking: No sig reported, Start Date 6/25/21, End Date , Taking? , Authorizing Provider Ruma Schulte MD    Medication mirtazapine (REMERON) 7.5 MG Tab, Sig Take 1 tablet (7.5 mg total) by mouth every evening., Start Date 6/8/21, End Date 6/8/22, Taking? , Authorizing Provider NONI Hanna MD    Medication nystatin (MYCOSTATIN) powder, Sig Apply topically 2 (two) times daily.Patient not taking: Reported on 12/21/2021, Start Date 6/8/21, End Date , Taking? , Authorizing Provider NONI Hanna MD    Medication lancing device Misc, Sig 1 Device by Misc.(Non-Drug; Combo Route) route 2 (two) times daily with meals., Start Date 5/7/18, End Date 2/24/21, Taking? , Authorizing Provider Rosales Barton MD      Current Facility-Administered Medications on File Prior to Encounter:  lactated ringers infusion, , Intravenous, Continuous, Abram Pacheco MD, New Bag at 03/09/21 0786    Current Outpatient Medications on File Prior to Encounter:  acetaminophen (TYLENOL) 500 MG tablet, Take 2 tablets (1,000 mg total) by mouth every 8 (eight) hours as needed for Pain., Disp: , Rfl: 0  allopurinoL (ZYLOPRIM) 100 MG tablet, Take 1 tablet (100 mg total) by mouth once daily., Disp: 30 tablet, Rfl: 5  atorvastatin (LIPITOR) 80 MG tablet, Take 1 tablet (80 mg total) by mouth every evening., Disp: 90 tablet, Rfl: 3  clopidogreL (PLAVIX) 75 mg tablet, Take 1 tablet (75 mg total) by mouth once daily., Disp: 90 tablet, Rfl:  3  EScitalopram oxalate (LEXAPRO) 10 MG tablet, Take 1 tablet (10 mg total) by mouth once daily., Disp: 30 tablet, Rfl: 11  furosemide (LASIX) 40 MG tablet, Take 40 mg by mouth 2 (two) times daily., Disp: , Rfl:   insulin aspart U-100 (NOVOLOG FLEXPEN U-100 INSULIN) 100 unit/mL (3 mL) InPn pen, Inject 5 Units into the skin 3 (three) times daily with meals. If not eating, ok to hold, Disp: 1 Box, Rfl: 3  insulin detemir U-100 (LEVEMIR FLEXTOUCH) 100 unit/mL (3 mL) SubQ InPn pen, Inject 8 Units into the skin once daily. (Patient taking differently: Inject 10 Units into the skin every evening.), Disp: 3 mL, Rfl: 3  pregabalin (LYRICA) 50 MG capsule, Take 1 capsule (50 mg total) by mouth 3 (three) times daily., Disp: 90 capsule, Rfl: 0  sodium bicarbonate 650 MG tablet, Take 1 tablet (650 mg total) by mouth 2 (two) times daily., Disp: 60 tablet, Rfl: 11  alogliptin (NESINA) 25 mg Tab, Take 1 tablet (25 mg) by mouth once daily., Disp: 30 tablet, Rfl: 11  apixaban (ELIQUIS) 5 mg Tab, Take 1 tablet (5 mg total) by mouth 2 (two) times daily., Disp: 180 tablet, Rfl: 3  blood-glucose meter,continuous (DEXCOM G6 ) Misc, Use to check blood sugars 4 times/day, Disp: 1 each, Rfl: 0  blood-glucose sensor (DEXCOM G6 SENSOR) Radha, Apply one sensor to skin every 10 days, Disp: 3 each, Rfl: 11  blood-glucose transmitter (DEXCOM G6 TRANSMITTER) Radha, Replace transmitter every 3 months to use with dexcom sensor, Disp: 1 each, Rfl: 3  collagenase (SANTYL) ointment, Apply topically once daily. Nursing to cleanse L groin and 2 areas to left AKA incision site with sterile normal saline,  then apply a thin layer of Santyl ointment to affected area daily. Cover with bordered foam island mepore dressing. (Patient not taking: Reported on 12/21/2021), Disp: , Rfl: 0  magnesium oxide (MAG-OX) 400 mg (241.3 mg magnesium) tablet, Take 1 tablet (400 mg total) by mouth 2 (two) times daily. (Patient not taking: No sig reported), Disp: , Rfl:  0  melatonin (MELATIN) 3 mg tablet, Take 2 tablets (6 mg total) by mouth nightly as needed for Insomnia. (Patient not taking: No sig reported), Disp: , Rfl: 0  mirtazapine (REMERON) 7.5 MG Tab, Take 1 tablet (7.5 mg total) by mouth every evening., Disp: 30 tablet, Rfl: 11  nystatin (MYCOSTATIN) powder, Apply topically 2 (two) times daily. (Patient not taking: Reported on 12/21/2021), Disp: 60 g, Rfl: 11  [DISCONTINUED] lancing device Misc, 1 Device by Misc.(Non-Drug; Combo Route) route 2 (two) times daily with meals., Disp: 1 each, Rfl: 0         Review of patient's allergies indicates:   -- Ciprofloxacin -- Itching   -- Contrast media     --  Kidney injury   -- Iodine     --  Kidney injury    Social History:   reports that she has quit smoking. She has never used smokeless tobacco. She reports current drug use. Drug: Marijuana. She reports that she does not drink alcohol.     Review of patient's family history indicates:  Problem: Diabetes      Relation: Mother          Age of Onset: (Not Specified)  Problem: Diabetes      Relation: Father          Age of Onset: (Not Specified)  Problem: Heart disease      Relation: Maternal Grandmother          Age of Onset: (Not Specified)  Problem: No Known Problems      Relation: Maternal Grandfather          Age of Onset: (Not Specified)  Problem: Diabetes      Relation: Paternal Grandmother          Age of Onset: (Not Specified)  Problem: No Known Problems      Relation: Paternal Grandfather          Age of Onset: (Not Specified)  Problem: Anesthesia problems      Relation: Neg Hx          Age of Onset: (Not Specified)      PHYSICAL EXAMINATION  Temp:  [98.3 °F (36.8 °C)-99 °F (37.2 °C)] 98.3 °F (36.8 °C)  Pulse:  [90-97] 92  Resp:  [18] 18  SpO2:  [92 %-99 %] 93 %  BP: (107-137)/(57-85) 134/72    General Condition:   awake alert x 3    Head & Neck  Anemia: None  Jaundice: None  Neck vein: Not distended  Carotid Bruits: none  Lymph nodes: none palpable  Thyroid:  normal    Chest: normal    Heart: normal    Rt. Breast: not examined  Lt. Breast: not examined  Axillary lymph nodes: none    Abdomen: Soft,  None tender with no palpable mass or organ  Hernia: none    Rectal: Defered    Extremities: s/p left aka. Right foot ischemic gangrene, advanced pvd    Vascular: normal    Specific focus Examination    Imp: gang erne right foot ,   pvd    PlanL recommend BKA  Will discuss with

## 2021-12-25 NOTE — PLAN OF CARE
Problem: Adult Inpatient Plan of Care  Goal: Plan of Care Review  Outcome: Ongoing, Progressing      Patient is alert, oriented X4. Care plan explained to patient, she verbalized understanding.      On room air, O2 saturation maintain 98%, no respiratory distress noted. On cardiac monitor, running normal sinus rhythm.      Deny vomiting/diarrhea. Complaint right heel pain, rate 8/10. PRN percocet given. Due medications given.      Maintain fall risk precaution, bed in lowest position, bed alarm on. Call light/personal items in reach. Instructed patient call for help as needed. Will continue to monitor.

## 2021-12-25 NOTE — SUBJECTIVE & OBJECTIVE
Interval History: No new issues, no acute events overnight, IV abx, possible BKA on Monday per sx    Review of Systems   Constitutional: Negative for chills and fever.   Respiratory: Negative for cough and shortness of breath.    Cardiovascular: Negative for chest pain and leg swelling.   Gastrointestinal: Negative for abdominal pain and nausea.   Musculoskeletal: Positive for arthralgias and myalgias.        Right foot pain   Skin: Positive for wound.   Neurological: Positive for weakness.     Objective:     Vital Signs (Most Recent):  Temp: 98.3 °F (36.8 °C) (12/25/21 1209)  Pulse: 96 (12/25/21 1209)  Resp: 18 (12/25/21 1209)  BP: 137/75 (12/25/21 1209)  SpO2: 96 % (12/25/21 1209) Vital Signs (24h Range):  Temp:  [98.3 °F (36.8 °C)-99 °F (37.2 °C)] 98.3 °F (36.8 °C)  Pulse:  [90-96] 96  Resp:  [18-20] 18  SpO2:  [92 %-99 %] 96 %  BP: (107-137)/(57-85) 137/75     Weight: 83 kg (182 lb 15.7 oz)  Body mass index is 30.45 kg/m².    Intake/Output Summary (Last 24 hours) at 12/25/2021 1352  Last data filed at 12/25/2021 0500  Gross per 24 hour   Intake --   Output 1700 ml   Net -1700 ml      Physical Exam  Vitals and nursing note reviewed.   Constitutional:       General: She is not in acute distress.     Appearance: Normal appearance.   HENT:      Head: Normocephalic and atraumatic.   Eyes:      Pupils: Pupils are equal, round, and reactive to light.   Cardiovascular:      Rate and Rhythm: Normal rate and regular rhythm.      Pulses: Normal pulses.      Heart sounds: Normal heart sounds.   Pulmonary:      Effort: Pulmonary effort is normal.   Abdominal:      General: Abdomen is flat.      Palpations: Abdomen is soft.   Musculoskeletal:         General: Tenderness present. No swelling.      Cervical back: Normal range of motion.   Skin:     General: Skin is warm and dry.      Findings: Lesion (right foot) present.   Neurological:      Mental Status: She is alert and oriented to person, place, and time.      Motor:  Weakness present.         Significant Labs:   All pertinent labs within the past 24 hours have been reviewed.  Blood Culture: No results for input(s): LABBLOO in the last 48 hours.  BMP:   Recent Labs   Lab 12/24/21  0633   *      K 4.0      CO2 21*   BUN 21*   CREATININE 1.3   CALCIUM 8.4*   MG 1.9     CBC:   Recent Labs   Lab 12/24/21  0633   WBC 16.08*   HGB 8.4*   HCT 26.7*   *     CMP:   Recent Labs   Lab 12/24/21  0633      K 4.0      CO2 21*   *   BUN 21*   CREATININE 1.3   CALCIUM 8.4*   ANIONGAP 12   EGFRNONAA 46*     Lactic Acid:   No results for input(s): LACTATE in the last 48 hours.    Significant Imaging: I have reviewed all pertinent imaging results/findings within the past 24 hours.

## 2021-12-25 NOTE — PROGRESS NOTES
"Surgery follow up  /72 (BP Location: Left arm, Patient Position: Lying)   Pulse 92   Temp 98.3 °F (36.8 °C) (Oral)   Resp 20   Ht 5' 5" (1.651 m)   Wt 83 kg (182 lb 15.7 oz)   LMP 09/30/2015 (Exact Date)   SpO2 (!) 93%   BMI 30.45 kg/m²   I/O last 3 completed shifts:  In: 427.8 [IV Piggyback:427.8]  Out: 2100 [Urine:2100]  No intake/output data recorded.  Recent Results (from the past 336 hour(s))   CBC with Automated Differential    Collection Time: 12/24/21  6:33 AM   Result Value Ref Range    WBC 16.08 (H) 3.90 - 12.70 K/uL    Hemoglobin 8.4 (L) 12.0 - 16.0 g/dL    Hematocrit 26.7 (L) 37.0 - 48.5 %    Platelets 678 (H) 150 - 450 K/uL   CBC with Automated Differential    Collection Time: 12/23/21  6:04 AM   Result Value Ref Range    WBC 11.68 3.90 - 12.70 K/uL    Hemoglobin 6.5 (L) 12.0 - 16.0 g/dL    Hematocrit 21.4 (L) 37.0 - 48.5 %    Platelets 656 (H) 150 - 450 K/uL   CBC auto differential    Collection Time: 12/22/21  8:09 AM   Result Value Ref Range    WBC 13.76 (H) 3.90 - 12.70 K/uL    Hemoglobin 7.7 (L) 12.0 - 16.0 g/dL    Hematocrit 25.8 (L) 37.0 - 48.5 %    Platelets 611 (H) 150 - 450 K/uL   foot unchanged , ischemic Gangrene, possible BKA Monday.    "

## 2021-12-25 NOTE — PROGRESS NOTES
Pharmacokinetic Assessment Follow Up: IV Vancomycin    Vancomycin serum concentration assessment(s):    The trough level was drawn correctly and can be used to guide therapy at this time. The measurement is above the desired definitive target range of 15 to 20 mcg/mL.    Vancomycin Regimen Plan:    Re-dose when the random level is less than 20 mcg/mL, next level to be drawn at 12/25/21 on 2200    Drug levels (last 3 results):  Recent Labs   Lab Result Units 12/23/21  2232 12/24/21  2218   Vancomycin-Trough ug/mL 32.1* 31.2*       Pharmacy will continue to follow and monitor vancomycin.    Please contact pharmacy at extension [phone] for questions regarding this assessment.    Thank you for the consult,   Mc Mtz       Patient brief summary:  Suyapa Connelly is a 56 y.o. female initiated on antimicrobial therapy with IV Vancomycin for treatment of bone/joint infection    The patient's current regimen is vanco 1250mg q24    Drug Allergies:   Review of patient's allergies indicates:   Allergen Reactions    Ciprofloxacin Itching    Contrast media      Kidney injury    Iodine      Kidney injury       Actual Body Weight:   83kg    Renal Function:   Estimated Creatinine Clearance: 52 mL/min (based on SCr of 1.3 mg/dL).,     Dialysis Method (if applicable):  N/A    CBC (last 72 hours):  Recent Labs   Lab Result Units 12/22/21  0809 12/23/21  0604 12/24/21  0633   WBC K/uL 13.76* 11.68 16.08*   Hemoglobin g/dL 7.7* 6.5* 8.4*   Hematocrit % 25.8* 21.4* 26.7*   Platelets K/uL 611* 656* 678*   Gran % % 78.7* 72.1 78.4*   Lymph % % 10.8* 15.8* 10.2*   Mono % % 8.0 9.1 8.8   Eosinophil % % 1.2 1.8 1.4   Basophil % % 0.6 0.6 0.5   Differential Method  Automated Automated Automated       Metabolic Panel (last 72 hours):  Recent Labs   Lab Result Units 12/23/21  0604 12/24/21  0633   Sodium mmol/L 138 136   Potassium mmol/L 4.0 4.0   Chloride mmol/L 106 103   CO2 mmol/L 22* 21*   Glucose mg/dL 85 117*   BUN mg/dL 26* 21*    Creatinine mg/dL 1.3 1.3   Magnesium mg/dL 1.9 1.9       Vancomycin Administrations:  vancomycin given in the last 96 hours                     vancomycin 1.25 g in dextrose 5% 250 mL IVPB (ready to mix) (mg) 1,250 mg New Bag 12/23/21 2204     1,250 mg New Bag 12/22/21 2236    vancomycin (VANCOCIN) 2,000 mg in dextrose 5 % 500 mL IVPB (mg) 2,000 mg New Bag 12/21/21 2319                    Microbiologic Results:  Microbiology Results (last 7 days)       Procedure Component Value Units Date/Time    Urine culture [[id number]] Collected: 12/21/21 2204    Order Status: Completed Specimen: Urine Updated: 12/23/21 0844     Urine Culture, Routine No growth    Narrative:      Specimen Source->Urine

## 2021-12-25 NOTE — ASSESSMENT & PLAN NOTE
- Non-healing wounds noted with purulent drainage to heal  - Gangrenous changes to toes, exposed bone noted  - Cont vanc, cefepime, flagyl  - MRI of right foot confirms osteo  - Podiatry assessed, consutled gen sx for BKA  - ID consulted, ok to DC abx after BKA  - Hold eliquis  - Percocet PRN pain

## 2021-12-25 NOTE — PROGRESS NOTES
[facility] Medicine  Progress Note    Patient Name: Suyapa Connelly  MRN: [id number]  Patient Class: IP- Inpatient   Admission Date: 12/21/2021  Length of Stay: 4 days  Attending Physician: Obinna Marr DO  Primary Care Provider: Magen Christensen MD        Subjective:     Principal Problem:Osteomyelitis of right foot        HPI:  Suyapa Connelly is a 57 yo female with a pmh of DM2, PVD, osteomyelitis, right AKA, CKD3, CAD s/p CABG, afib on eliquis. She presented with right foot pain x 1 month. She has an ulcer to her right heel. Presented with dressing to right heel which was removed and had purulent foul smelling drainage noted. The dressing was not changed in 2 months since she last saw Dr. Asekw on 10/22/21 for arthrectomy, thrombectomy, and PTA with DCB to right lower extremity. Had not gone to follow up appts. She now has what appears to be dry gangrene to right toes with bone exposure. She has not been compliant with medications and states she had not taken eliquis in over a month. She states she has not been able to stand due to right leg weakness since her left AKA in May 2021. She also has a pressure wound to the lateral right lower leg. She takes her 's percocet for pain, which was helping some.       Overview/Hospital Course:  No notes on file    Interval History: No new issues, no acute events overnight, IV abx, possible BKA on Monday per sx    Review of Systems   Constitutional: Negative for chills and fever.   Respiratory: Negative for cough and shortness of breath.    Cardiovascular: Negative for chest pain and leg swelling.   Gastrointestinal: Negative for abdominal pain and nausea.   Musculoskeletal: Positive for arthralgias and myalgias.        Right foot pain   Skin: Positive for wound.   Neurological: Positive for weakness.     Objective:     Vital Signs (Most Recent):  Temp: 98.3 °F (36.8 °C) (12/25/21 1209)  Pulse: 96 (12/25/21 1209)  Resp: 18 (12/25/21 1209)  BP:  137/75 (12/25/21 1209)  SpO2: 96 % (12/25/21 1209) Vital Signs (24h Range):  Temp:  [98.3 °F (36.8 °C)-99 °F (37.2 °C)] 98.3 °F (36.8 °C)  Pulse:  [90-96] 96  Resp:  [18-20] 18  SpO2:  [92 %-99 %] 96 %  BP: (107-137)/(57-85) 137/75     Weight: 83 kg (182 lb 15.7 oz)  Body mass index is 30.45 kg/m².    Intake/Output Summary (Last 24 hours) at 12/25/2021 1352  Last data filed at 12/25/2021 0500  Gross per 24 hour   Intake --   Output 1700 ml   Net -1700 ml      Physical Exam  Vitals and nursing note reviewed.   Constitutional:       General: She is not in acute distress.     Appearance: Normal appearance.   HENT:      Head: Normocephalic and atraumatic.   Eyes:      Pupils: Pupils are equal, round, and reactive to light.   Cardiovascular:      Rate and Rhythm: Normal rate and regular rhythm.      Pulses: Normal pulses.      Heart sounds: Normal heart sounds.   Pulmonary:      Effort: Pulmonary effort is normal.   Abdominal:      General: Abdomen is flat.      Palpations: Abdomen is soft.   Musculoskeletal:         General: Tenderness present. No swelling.      Cervical back: Normal range of motion.   Skin:     General: Skin is warm and dry.      Findings: Lesion (right foot) present.   Neurological:      Mental Status: She is alert and oriented to person, place, and time.      Motor: Weakness present.         Significant Labs:   All pertinent labs within the past 24 hours have been reviewed.  Blood Culture: No results for input(s): LABBLOO in the last 48 hours.  BMP:   Recent Labs   Lab 12/24/21  0633   *      K 4.0      CO2 21*   BUN 21*   CREATININE 1.3   CALCIUM 8.4*   MG 1.9     CBC:   Recent Labs   Lab 12/24/21  0633   WBC 16.08*   HGB 8.4*   HCT 26.7*   *     CMP:   Recent Labs   Lab 12/24/21  0633      K 4.0      CO2 21*   *   BUN 21*   CREATININE 1.3   CALCIUM 8.4*   ANIONGAP 12   EGFRNONAA 46*     Lactic Acid:   No results for input(s): LACTATE in the last 48  hours.    Significant Imaging: I have reviewed all pertinent imaging results/findings within the past 24 hours.      Assessment/Plan:      * Osteomyelitis of right foot  - Non-healing wounds noted with purulent drainage to heal  - Gangrenous changes to toes, exposed bone noted  - Cont vanc, cefepime, flagyl  - MRI of right foot confirms osteo  - Podiatry assessed, consutled gen sx for BKA  - ID consulted, ok to DC abx after BKA  - Hold eliquis  - Percocet PRN pain        Diabetic ulcer of right foot associated with type 2 diabetes mellitus  - See osteo      Cellulitis  - See osteo      Stage 3 chronic kidney disease due to type 2 diabetes mellitus  - At baseline  - Renally dose meds      Chronic diastolic heart failure  - Reports taking lasix daily for week prior admission  - Cont lasix daily      Chronic anemia  - Hgb drop from 13 to 7 over 2 month period  - Type and screen  - Hold plavix and eliquis (had not been taking)  - Denies acute blood loss  - sp 1 U PRBC 12/23      Critical lower limb ischemia  - Had intervention to RLE on 10/22/21  - Persistent non-healing diabetic right foot wounds   - Arterial US showing stenosis   - Cardiology following  - NPO past midnight, allow diet today  - Had not been taking eliquis, cont to hold for possible intervention                Paroxysmal atrial fibrillation  - NSR on tele  - Noncompliant with eliquis  - Cont to hold for possible RLE intervention      Type 2 diabetes mellitus with hyperglycemia, with long-term current use of insulin  - Hold home meds  - Detemir 5u nightly  - Accuchecks and ISS  - A1C 9.6        Essential hypertension  - Started amlodipine 5mg daily  - Monitor pressures      Gangrene  - Dry gangrene right foot  - Podiatry assessed  - Gen surgery consulted for BKA, possible sx Monday        VTE Risk Mitigation (From admission, onward)         Ordered     Reason for No Pharmacological VTE Prophylaxis  Once        Question:  Reasons:  Answer:  Already  adequately anticoagulated on oral Anticoagulants    12/21/21 1910     IP VTE HIGH RISK PATIENT  Once         12/21/21 1910                Discharge Planning   DEVAN:      Code Status: Full Code   Is the patient medically ready for discharge?:     Reason for patient still in hospital (select all that apply): Treatment  Discharge Plan A: Rehab                  Obinna Marr DO  Department of [facility] Medicine   [facility]

## 2021-12-25 NOTE — ASSESSMENT & PLAN NOTE
- Dry gangrene right foot  - Podiatry assessed  - Gen surgery consulted for BKA, possible sx Monday

## 2021-12-26 LAB
ANION GAP SERPL CALC-SCNC: 12 MMOL/L (ref 8–16)
BASOPHILS # BLD AUTO: 0.1 K/UL (ref 0–0.2)
BASOPHILS NFR BLD: 0.7 % (ref 0–1.9)
BUN SERPL-MCNC: 19 MG/DL (ref 6–20)
CALCIUM SERPL-MCNC: 9 MG/DL (ref 8.7–10.5)
CHLORIDE SERPL-SCNC: 103 MMOL/L (ref 95–110)
CO2 SERPL-SCNC: 20 MMOL/L (ref 23–29)
CREAT SERPL-MCNC: 1.2 MG/DL (ref 0.5–1.4)
DIFFERENTIAL METHOD: ABNORMAL
EOSINOPHIL # BLD AUTO: 0.3 K/UL (ref 0–0.5)
EOSINOPHIL NFR BLD: 2.2 % (ref 0–8)
ERYTHROCYTE [DISTWIDTH] IN BLOOD BY AUTOMATED COUNT: 14.1 % (ref 11.5–14.5)
EST. GFR  (AFRICAN AMERICAN): 59 ML/MIN/1.73 M^2
EST. GFR  (NON AFRICAN AMERICAN): 51 ML/MIN/1.73 M^2
GLUCOSE SERPL-MCNC: 86 MG/DL (ref 70–110)
HCT VFR BLD AUTO: 25.7 % (ref 37–48.5)
HGB BLD-MCNC: 8.2 G/DL (ref 12–16)
IMM GRANULOCYTES # BLD AUTO: 0.15 K/UL (ref 0–0.04)
IMM GRANULOCYTES NFR BLD AUTO: 1 % (ref 0–0.5)
LYMPHOCYTES # BLD AUTO: 1.6 K/UL (ref 1–4.8)
LYMPHOCYTES NFR BLD: 11.1 % (ref 18–48)
MCH RBC QN AUTO: 29.9 PG (ref 27–31)
MCHC RBC AUTO-ENTMCNC: 31.9 G/DL (ref 32–36)
MCV RBC AUTO: 94 FL (ref 82–98)
MONOCYTES # BLD AUTO: 1.5 K/UL (ref 0.3–1)
MONOCYTES NFR BLD: 10.1 % (ref 4–15)
NEUTROPHILS # BLD AUTO: 11.1 K/UL (ref 1.8–7.7)
NEUTROPHILS NFR BLD: 74.9 % (ref 38–73)
NRBC BLD-RTO: 0 /100 WBC
PLATELET # BLD AUTO: 632 K/UL (ref 150–450)
PMV BLD AUTO: 10.3 FL (ref 9.2–12.9)
POCT GLUCOSE: 101 MG/DL (ref 70–110)
POCT GLUCOSE: 148 MG/DL (ref 70–110)
POCT GLUCOSE: 219 MG/DL (ref 70–110)
POCT GLUCOSE: 93 MG/DL (ref 70–110)
POTASSIUM SERPL-SCNC: 3.5 MMOL/L (ref 3.5–5.1)
RBC # BLD AUTO: 2.74 M/UL (ref 4–5.4)
SODIUM SERPL-SCNC: 135 MMOL/L (ref 136–145)
WBC # BLD AUTO: 14.79 K/UL (ref 3.9–12.7)

## 2021-12-26 PROCEDURE — 25000003 PHARM REV CODE 250: Performed by: INTERNAL MEDICINE

## 2021-12-26 PROCEDURE — 94761 N-INVAS EAR/PLS OXIMETRY MLT: CPT

## 2021-12-26 PROCEDURE — 80048 BASIC METABOLIC PNL TOTAL CA: CPT | Performed by: STUDENT IN AN ORGANIZED HEALTH CARE EDUCATION/TRAINING PROGRAM

## 2021-12-26 PROCEDURE — 11000001 HC ACUTE MED/SURG PRIVATE ROOM

## 2021-12-26 PROCEDURE — 63600175 PHARM REV CODE 636 W HCPCS: Performed by: INTERNAL MEDICINE

## 2021-12-26 PROCEDURE — 93010 EKG 12-LEAD: ICD-10-PCS | Mod: ,,, | Performed by: INTERNAL MEDICINE

## 2021-12-26 PROCEDURE — 63600175 PHARM REV CODE 636 W HCPCS: Performed by: NURSE PRACTITIONER

## 2021-12-26 PROCEDURE — 93005 ELECTROCARDIOGRAM TRACING: CPT

## 2021-12-26 PROCEDURE — C9399 UNCLASSIFIED DRUGS OR BIOLOG: HCPCS | Performed by: NURSE PRACTITIONER

## 2021-12-26 PROCEDURE — 27000207 HC ISOLATION

## 2021-12-26 PROCEDURE — 85025 COMPLETE CBC W/AUTO DIFF WBC: CPT | Performed by: STUDENT IN AN ORGANIZED HEALTH CARE EDUCATION/TRAINING PROGRAM

## 2021-12-26 PROCEDURE — 25000003 PHARM REV CODE 250: Performed by: STUDENT IN AN ORGANIZED HEALTH CARE EDUCATION/TRAINING PROGRAM

## 2021-12-26 PROCEDURE — 93010 ELECTROCARDIOGRAM REPORT: CPT | Mod: ,,, | Performed by: INTERNAL MEDICINE

## 2021-12-26 PROCEDURE — 36415 COLL VENOUS BLD VENIPUNCTURE: CPT | Performed by: STUDENT IN AN ORGANIZED HEALTH CARE EDUCATION/TRAINING PROGRAM

## 2021-12-26 PROCEDURE — 25000003 PHARM REV CODE 250: Performed by: NURSE PRACTITIONER

## 2021-12-26 RX ORDER — AMOXICILLIN 250 MG
1 CAPSULE ORAL 2 TIMES DAILY
Status: DISCONTINUED | OUTPATIENT
Start: 2021-12-26 | End: 2022-01-10 | Stop reason: HOSPADM

## 2021-12-26 RX ORDER — MUPIROCIN 20 MG/G
OINTMENT TOPICAL
Status: DISCONTINUED | OUTPATIENT
Start: 2021-12-26 | End: 2021-12-28 | Stop reason: HOSPADM

## 2021-12-26 RX ORDER — MUPIROCIN 20 MG/G
OINTMENT TOPICAL
Status: CANCELLED | OUTPATIENT
Start: 2021-12-26

## 2021-12-26 RX ORDER — SODIUM CHLORIDE 9 MG/ML
INJECTION, SOLUTION INTRAVENOUS CONTINUOUS
Status: CANCELLED | OUTPATIENT
Start: 2021-12-26

## 2021-12-26 RX ORDER — OXYCODONE AND ACETAMINOPHEN 7.5; 325 MG/1; MG/1
1 TABLET ORAL EVERY 4 HOURS PRN
Status: DISCONTINUED | OUTPATIENT
Start: 2021-12-26 | End: 2021-12-29

## 2021-12-26 RX ORDER — POLYETHYLENE GLYCOL 3350 17 G/17G
17 POWDER, FOR SOLUTION ORAL DAILY
Status: DISCONTINUED | OUTPATIENT
Start: 2021-12-26 | End: 2022-01-10 | Stop reason: HOSPADM

## 2021-12-26 RX ADMIN — FUROSEMIDE 40 MG: 40 TABLET ORAL at 08:12

## 2021-12-26 RX ADMIN — OXYCODONE AND ACETAMINOPHEN 1 TABLET: 7.5; 325 TABLET ORAL at 10:12

## 2021-12-26 RX ADMIN — OXYCODONE AND ACETAMINOPHEN 1 TABLET: 7.5; 325 TABLET ORAL at 05:12

## 2021-12-26 RX ADMIN — ESCITALOPRAM OXALATE 10 MG: 10 TABLET ORAL at 08:12

## 2021-12-26 RX ADMIN — METRONIDAZOLE 500 MG: 500 TABLET ORAL at 10:12

## 2021-12-26 RX ADMIN — METRONIDAZOLE 500 MG: 500 TABLET ORAL at 02:12

## 2021-12-26 RX ADMIN — INSULIN ASPART 1 UNITS: 100 INJECTION, SOLUTION INTRAVENOUS; SUBCUTANEOUS at 08:12

## 2021-12-26 RX ADMIN — PREGABALIN 50 MG: 50 CAPSULE ORAL at 08:12

## 2021-12-26 RX ADMIN — VANCOMYCIN HYDROCHLORIDE 750 MG: 750 INJECTION, POWDER, LYOPHILIZED, FOR SOLUTION INTRAVENOUS at 03:12

## 2021-12-26 RX ADMIN — AMLODIPINE BESYLATE 5 MG: 5 TABLET ORAL at 08:12

## 2021-12-26 RX ADMIN — MIRTAZAPINE 7.5 MG: 7.5 TABLET ORAL at 08:12

## 2021-12-26 RX ADMIN — POLYETHYLENE GLYCOL 3350 17 G: 17 POWDER, FOR SOLUTION ORAL at 10:12

## 2021-12-26 RX ADMIN — ATORVASTATIN CALCIUM 80 MG: 40 TABLET, FILM COATED ORAL at 08:12

## 2021-12-26 RX ADMIN — METRONIDAZOLE 500 MG: 500 TABLET ORAL at 05:12

## 2021-12-26 RX ADMIN — CEFEPIME HYDROCHLORIDE 1 G: 1 INJECTION, SOLUTION INTRAVENOUS at 08:12

## 2021-12-26 RX ADMIN — OXYCODONE AND ACETAMINOPHEN 1 TABLET: 7.5; 325 TABLET ORAL at 02:12

## 2021-12-26 RX ADMIN — MELATONIN TAB 3 MG 6 MG: 3 TAB at 08:12

## 2021-12-26 RX ADMIN — PREGABALIN 50 MG: 50 CAPSULE ORAL at 02:12

## 2021-12-26 RX ADMIN — OXYCODONE AND ACETAMINOPHEN 1 TABLET: 7.5; 325 TABLET ORAL at 06:12

## 2021-12-26 RX ADMIN — ALLOPURINOL 100 MG: 100 TABLET ORAL at 08:12

## 2021-12-26 RX ADMIN — INSULIN DETEMIR 5 UNITS: 100 INJECTION, SOLUTION SUBCUTANEOUS at 08:12

## 2021-12-26 RX ADMIN — DOCUSATE SODIUM AND SENNOSIDES 1 TABLET: 8.6; 5 TABLET, FILM COATED ORAL at 10:12

## 2021-12-26 NOTE — PROGRESS NOTES
"Surgery follow up  BP (!) 170/80 (BP Location: Right arm, Patient Position: Lying)   Pulse 94   Temp 97.9 °F (36.6 °C) (Oral)   Resp 20   Ht 5' 5" (1.651 m)   Wt 79.2 kg (174 lb 9.7 oz)   LMP 09/30/2015 (Exact Date)   SpO2 99%   BMI 29.06 kg/m²   I/O last 3 completed shifts:  In: 296.6 [IV Piggyback:296.6]  Out: 3525 [Urine:3525]  No intake/output data recorded.  Recent Results (from the past 336 hour(s))   CBC Auto Differential    Collection Time: 12/26/21  8:44 AM   Result Value Ref Range    WBC 14.79 (H) 3.90 - 12.70 K/uL    Hemoglobin 8.2 (L) 12.0 - 16.0 g/dL    Hematocrit 25.7 (L) 37.0 - 48.5 %    Platelets 632 (H) 150 - 450 K/uL   CBC with Automated Differential    Collection Time: 12/24/21  6:33 AM   Result Value Ref Range    WBC 16.08 (H) 3.90 - 12.70 K/uL    Hemoglobin 8.4 (L) 12.0 - 16.0 g/dL    Hematocrit 26.7 (L) 37.0 - 48.5 %    Platelets 678 (H) 150 - 450 K/uL   CBC with Automated Differential    Collection Time: 12/23/21  6:04 AM   Result Value Ref Range    WBC 11.68 3.90 - 12.70 K/uL    Hemoglobin 6.5 (L) 12.0 - 16.0 g/dL    Hematocrit 21.4 (L) 37.0 - 48.5 %    Platelets 656 (H) 150 - 450 K/uL   right foot unchnaged,  Heavy schedule in am , will schedule for Tuesday.  "

## 2021-12-26 NOTE — CONSULTS
Thank you for your consult to [facility]. We have reviewed the patient chart. This patient does meet criteria for [facility] service at this time. Will assume care on 12/26/21 at 7AM     Ruben Flood MD  [facility]

## 2021-12-26 NOTE — PROGRESS NOTES
Pharmacokinetic Assessment Follow Up: IV Vancomycin    Vancomycin serum concentration assessment(s):    The random level was drawn correctly and can be used to guide therapy at this time. The measurement is within the desired definitive target range of 15 to 20 mcg/mL.    Vancomycin Regimen Plan:    Re-dose when the random level is less than 20 mcg/mL, next level to be drawn at 12/27/21 on 0300    Drug levels (last 3 results):  Recent Labs   Lab Result Units 12/23/21  2232 12/24/21  2218 12/25/21  2201   Vancomycin, Random ug/mL  --   --  20.4   Vancomycin-Trough ug/mL 32.1* 31.2*  --        Pharmacy will continue to follow and monitor vancomycin.    Please contact pharmacy at extension [phone] for questions regarding this assessment.    Thank you for the consult,   Mc Mtz       Patient brief summary:  Suyapa Connelly is a 56 y.o. female initiated on antimicrobial therapy with IV Vancomycin for treatment of bone/joint infection    The patient's current regimen is pulse dose vanco 750mg    Drug Allergies:   Review of patient's allergies indicates:   Allergen Reactions    Ciprofloxacin Itching    Contrast media      Kidney injury    Iodine      Kidney injury       Actual Body Weight:   83kg    Renal Function:   Estimated Creatinine Clearance: 52 mL/min (based on SCr of 1.3 mg/dL).,     Dialysis Method (if applicable):  N/A    CBC (last 72 hours):  Recent Labs   Lab Result Units 12/23/21  0604 12/24/21  0633   WBC K/uL 11.68 16.08*   Hemoglobin g/dL 6.5* 8.4*   Hematocrit % 21.4* 26.7*   Platelets K/uL 656* 678*   Gran % % 72.1 78.4*   Lymph % % 15.8* 10.2*   Mono % % 9.1 8.8   Eosinophil % % 1.8 1.4   Basophil % % 0.6 0.5   Differential Method  Automated Automated       Metabolic Panel (last 72 hours):  Recent Labs   Lab Result Units 12/23/21  0604 12/24/21  0633   Sodium mmol/L 138 136   Potassium mmol/L 4.0 4.0   Chloride mmol/L 106 103   CO2 mmol/L 22* 21*   Glucose mg/dL 85 117*   BUN mg/dL 26* 21*    Creatinine mg/dL 1.3 1.3   Magnesium mg/dL 1.9 1.9       Vancomycin Administrations:  vancomycin given in the last 96 hours                     vancomycin 1.25 g in dextrose 5% 250 mL IVPB (ready to mix) (mg) 1,250 mg New Bag 12/23/21 2204     1,250 mg New Bag 12/22/21 2236                    Microbiologic Results:  Microbiology Results (last 7 days)       Procedure Component Value Units Date/Time    Urine culture [[id number]] Collected: 12/21/21 2204    Order Status: Completed Specimen: Urine Updated: 12/23/21 0844     Urine Culture, Routine No growth    Narrative:      Specimen Source->Urine

## 2021-12-27 ENCOUNTER — ANESTHESIA EVENT (OUTPATIENT)
Dept: SURGERY | Facility: HOSPITAL | Age: 56
DRG: 240 | End: 2021-12-27
Payer: MEDICARE

## 2021-12-27 ENCOUNTER — PATIENT OUTREACH (OUTPATIENT)
Dept: ADMINISTRATIVE | Facility: OTHER | Age: 56
End: 2021-12-27
Payer: MEDICARE

## 2021-12-27 LAB
POCT GLUCOSE: 105 MG/DL (ref 70–110)
POCT GLUCOSE: 120 MG/DL (ref 70–110)
POCT GLUCOSE: 160 MG/DL (ref 70–110)
POCT GLUCOSE: 173 MG/DL (ref 70–110)
VANCOMYCIN SERPL-MCNC: 20.8 UG/ML

## 2021-12-27 PROCEDURE — 25000003 PHARM REV CODE 250: Performed by: NURSE PRACTITIONER

## 2021-12-27 PROCEDURE — 63600175 PHARM REV CODE 636 W HCPCS: Performed by: NURSE PRACTITIONER

## 2021-12-27 PROCEDURE — 25000003 PHARM REV CODE 250: Performed by: STUDENT IN AN ORGANIZED HEALTH CARE EDUCATION/TRAINING PROGRAM

## 2021-12-27 PROCEDURE — 80202 ASSAY OF VANCOMYCIN: CPT | Performed by: INTERNAL MEDICINE

## 2021-12-27 PROCEDURE — 63600175 PHARM REV CODE 636 W HCPCS: Performed by: STUDENT IN AN ORGANIZED HEALTH CARE EDUCATION/TRAINING PROGRAM

## 2021-12-27 PROCEDURE — 27000207 HC ISOLATION

## 2021-12-27 PROCEDURE — 94761 N-INVAS EAR/PLS OXIMETRY MLT: CPT

## 2021-12-27 PROCEDURE — 11000001 HC ACUTE MED/SURG PRIVATE ROOM

## 2021-12-27 PROCEDURE — 36415 COLL VENOUS BLD VENIPUNCTURE: CPT | Performed by: INTERNAL MEDICINE

## 2021-12-27 RX ADMIN — OXYCODONE AND ACETAMINOPHEN 1 TABLET: 7.5; 325 TABLET ORAL at 02:12

## 2021-12-27 RX ADMIN — ALLOPURINOL 100 MG: 100 TABLET ORAL at 10:12

## 2021-12-27 RX ADMIN — CEFEPIME HYDROCHLORIDE 1 G: 1 INJECTION, SOLUTION INTRAVENOUS at 09:12

## 2021-12-27 RX ADMIN — OXYCODONE AND ACETAMINOPHEN 1 TABLET: 7.5; 325 TABLET ORAL at 06:12

## 2021-12-27 RX ADMIN — INSULIN DETEMIR 5 UNITS: 100 INJECTION, SOLUTION SUBCUTANEOUS at 09:12

## 2021-12-27 RX ADMIN — ESCITALOPRAM OXALATE 10 MG: 10 TABLET ORAL at 10:12

## 2021-12-27 RX ADMIN — POLYETHYLENE GLYCOL 3350 17 G: 17 POWDER, FOR SOLUTION ORAL at 10:12

## 2021-12-27 RX ADMIN — METRONIDAZOLE 500 MG: 500 TABLET ORAL at 02:12

## 2021-12-27 RX ADMIN — DOCUSATE SODIUM AND SENNOSIDES 1 TABLET: 8.6; 5 TABLET, FILM COATED ORAL at 10:12

## 2021-12-27 RX ADMIN — FUROSEMIDE 40 MG: 40 TABLET ORAL at 10:12

## 2021-12-27 RX ADMIN — PREGABALIN 50 MG: 50 CAPSULE ORAL at 02:12

## 2021-12-27 RX ADMIN — VANCOMYCIN HYDROCHLORIDE 500 MG: 500 INJECTION, POWDER, LYOPHILIZED, FOR SOLUTION INTRAVENOUS at 10:12

## 2021-12-27 RX ADMIN — OXYCODONE AND ACETAMINOPHEN 1 TABLET: 7.5; 325 TABLET ORAL at 12:12

## 2021-12-27 RX ADMIN — CEFEPIME HYDROCHLORIDE 1 G: 1 INJECTION, SOLUTION INTRAVENOUS at 11:12

## 2021-12-27 RX ADMIN — METRONIDAZOLE 500 MG: 500 TABLET ORAL at 09:12

## 2021-12-27 RX ADMIN — MIRTAZAPINE 7.5 MG: 7.5 TABLET ORAL at 09:12

## 2021-12-27 RX ADMIN — METRONIDAZOLE 500 MG: 500 TABLET ORAL at 06:12

## 2021-12-27 RX ADMIN — OXYCODONE AND ACETAMINOPHEN 1 TABLET: 7.5; 325 TABLET ORAL at 09:12

## 2021-12-27 RX ADMIN — AMLODIPINE BESYLATE 5 MG: 5 TABLET ORAL at 10:12

## 2021-12-27 RX ADMIN — PREGABALIN 50 MG: 50 CAPSULE ORAL at 10:12

## 2021-12-27 RX ADMIN — PREGABALIN 50 MG: 50 CAPSULE ORAL at 09:12

## 2021-12-27 RX ADMIN — MELATONIN TAB 3 MG 6 MG: 3 TAB at 09:12

## 2021-12-27 RX ADMIN — ATORVASTATIN CALCIUM 80 MG: 40 TABLET, FILM COATED ORAL at 09:12

## 2021-12-27 NOTE — PLAN OF CARE
12/27/21 1304   Discharge Reassessment   Assessment Type Discharge Planning Reassessment   Did the patient's condition or plan change since previous assessment? Yes  (possible surgery-BKA on Tues 12/28)   Communicated DEVAN with patient/caregiver Date not available/Unable to determine   Discharge Plan A Rehab   Discharge Plan B Home Health   DME Needed Upon Discharge    (TBD)   Discharge Barriers Identified None   Why the patient remains in the hospital Requires continued medical care   Post-Acute Status   Post-Acute Authorization Placement   Post-Acute Placement Status Pending medical clearance/testing   Discharge Delays (!) Procedure Scheduling (IR, OR, Labs, Echo, Cath, Echo, EEG)

## 2021-12-27 NOTE — PROGRESS NOTES
Pharmacokinetic Assessment Follow Up: IV Vancomycin    Vancomycin serum concentration assessment(s):    The random level was drawn correctly and can be used to guide therapy at this time. The measurement is above the desired definitive target range of 15 to 20 mcg/mL.    Vancomycin Regimen Plan:    Re-dose when the random level is less than 20 mcg/mL, next level to be drawn at 12/28/21 on 0900    Drug levels (last 3 results):  Recent Labs   Lab Result Units 12/24/21  2218 12/25/21  2201 12/27/21  0315   Vancomycin, Random ug/mL  --  20.4 20.8   Vancomycin-Trough ug/mL 31.2*  --   --        Pharmacy will continue to follow and monitor vancomycin.    Please contact pharmacy at extension [phone] for questions regarding this assessment.    Thank you for the consult,   Mc Mtz       Patient brief summary:  Suyapa Connelly is a 56 y.o. female initiated on antimicrobial therapy with IV Vancomycin for treatment of bone/joint infection    The patient's current regimen is pulse dosing give vanco 500mg    Drug Allergies:   Review of patient's allergies indicates:   Allergen Reactions    Ciprofloxacin Itching    Contrast media      Kidney injury    Iodine      Kidney injury       Actual Body Weight:   79kg    Renal Function:   Estimated Creatinine Clearance: 55.1 mL/min (based on SCr of 1.2 mg/dL).,     Dialysis Method (if applicable):  N/A    CBC (last 72 hours):  Recent Labs   Lab Result Units 12/24/21  0633 12/26/21  0844   WBC K/uL 16.08* 14.79*   Hemoglobin g/dL 8.4* 8.2*   Hematocrit % 26.7* 25.7*   Platelets K/uL 678* 632*   Gran % % 78.4* 74.9*   Lymph % % 10.2* 11.1*   Mono % % 8.8 10.1   Eosinophil % % 1.4 2.2   Basophil % % 0.5 0.7   Differential Method  Automated Automated       Metabolic Panel (last 72 hours):  Recent Labs   Lab Result Units 12/24/21  0633 12/26/21  0844   Sodium mmol/L 136 135*   Potassium mmol/L 4.0 3.5   Chloride mmol/L 103 103   CO2 mmol/L 21* 20*   Glucose mg/dL 117* 86   BUN mg/dL 21*  19   Creatinine mg/dL 1.3 1.2   Magnesium mg/dL 1.9  --        Vancomycin Administrations:  vancomycin given in the last 96 hours                     vancomycin 750 mg in dextrose 5 % 250 mL IVPB (ready to mix system) (mg) 750 mg New Bag 12/26/21 0321    vancomycin 1.25 g in dextrose 5% 250 mL IVPB (ready to mix) (mg) 1,250 mg New Bag 12/23/21 2204                    Microbiologic Results:  Microbiology Results (last 7 days)       Procedure Component Value Units Date/Time    Urine culture [[id number]] Collected: 12/21/21 2204    Order Status: Completed Specimen: Urine Updated: 12/23/21 0844     Urine Culture, Routine No growth    Narrative:      Specimen Source->Urine

## 2021-12-28 ENCOUNTER — ANESTHESIA (OUTPATIENT)
Dept: SURGERY | Facility: HOSPITAL | Age: 56
DRG: 240 | End: 2021-12-28
Payer: MEDICARE

## 2021-12-28 LAB
POCT GLUCOSE: 120 MG/DL (ref 70–110)
POCT GLUCOSE: 134 MG/DL (ref 70–110)
POCT GLUCOSE: 135 MG/DL (ref 70–110)
VANCOMYCIN SERPL-MCNC: 19.8 UG/ML

## 2021-12-28 PROCEDURE — 25000003 PHARM REV CODE 250: Performed by: STUDENT IN AN ORGANIZED HEALTH CARE EDUCATION/TRAINING PROGRAM

## 2021-12-28 PROCEDURE — 88307 TISSUE EXAM BY PATHOLOGIST: CPT | Mod: 26,,, | Performed by: PATHOLOGY

## 2021-12-28 PROCEDURE — 25000003 PHARM REV CODE 250: Performed by: NURSE PRACTITIONER

## 2021-12-28 PROCEDURE — 36415 COLL VENOUS BLD VENIPUNCTURE: CPT | Performed by: STUDENT IN AN ORGANIZED HEALTH CARE EDUCATION/TRAINING PROGRAM

## 2021-12-28 PROCEDURE — 88307 TISSUE EXAM BY PATHOLOGIST: CPT | Performed by: PATHOLOGY

## 2021-12-28 PROCEDURE — 11000001 HC ACUTE MED/SURG PRIVATE ROOM

## 2021-12-28 PROCEDURE — 27000207 HC ISOLATION

## 2021-12-28 PROCEDURE — 88307 PR  SURG PATH,LEVEL V: ICD-10-PCS | Mod: 26,,, | Performed by: PATHOLOGY

## 2021-12-28 PROCEDURE — 37000008 HC ANESTHESIA 1ST 15 MINUTES: Performed by: SURGERY

## 2021-12-28 PROCEDURE — 71000033 HC RECOVERY, INTIAL HOUR: Performed by: SURGERY

## 2021-12-28 PROCEDURE — 63600175 PHARM REV CODE 636 W HCPCS: Performed by: NURSE PRACTITIONER

## 2021-12-28 PROCEDURE — 63600175 PHARM REV CODE 636 W HCPCS: Performed by: STUDENT IN AN ORGANIZED HEALTH CARE EDUCATION/TRAINING PROGRAM

## 2021-12-28 PROCEDURE — 36000710: Performed by: SURGERY

## 2021-12-28 PROCEDURE — C9399 UNCLASSIFIED DRUGS OR BIOLOG: HCPCS | Performed by: NURSE PRACTITIONER

## 2021-12-28 PROCEDURE — 36000711: Performed by: SURGERY

## 2021-12-28 PROCEDURE — 63600175 PHARM REV CODE 636 W HCPCS: Performed by: SURGERY

## 2021-12-28 PROCEDURE — 71000039 HC RECOVERY, EACH ADD'L HOUR: Performed by: SURGERY

## 2021-12-28 PROCEDURE — 37000009 HC ANESTHESIA EA ADD 15 MINS: Performed by: SURGERY

## 2021-12-28 PROCEDURE — 80202 ASSAY OF VANCOMYCIN: CPT | Performed by: STUDENT IN AN ORGANIZED HEALTH CARE EDUCATION/TRAINING PROGRAM

## 2021-12-28 PROCEDURE — A4216 STERILE WATER/SALINE, 10 ML: HCPCS | Performed by: SURGERY

## 2021-12-28 PROCEDURE — 94761 N-INVAS EAR/PLS OXIMETRY MLT: CPT

## 2021-12-28 PROCEDURE — 25000003 PHARM REV CODE 250: Performed by: SURGERY

## 2021-12-28 RX ORDER — IPRATROPIUM BROMIDE AND ALBUTEROL SULFATE 2.5; .5 MG/3ML; MG/3ML
3 SOLUTION RESPIRATORY (INHALATION) EVERY 4 HOURS PRN
Status: DISCONTINUED | OUTPATIENT
Start: 2021-12-28 | End: 2021-12-28 | Stop reason: HOSPADM

## 2021-12-28 RX ORDER — SODIUM CHLORIDE 0.9 % (FLUSH) 0.9 %
10 SYRINGE (ML) INJECTION
Status: DISCONTINUED | OUTPATIENT
Start: 2021-12-28 | End: 2021-12-28 | Stop reason: HOSPADM

## 2021-12-28 RX ORDER — OXYCODONE AND ACETAMINOPHEN 5; 325 MG/1; MG/1
1 TABLET ORAL
Status: DISCONTINUED | OUTPATIENT
Start: 2021-12-28 | End: 2021-12-28 | Stop reason: HOSPADM

## 2021-12-28 RX ORDER — VANCOMYCIN HYDROCHLORIDE 1 G/20ML
INJECTION, POWDER, LYOPHILIZED, FOR SOLUTION INTRAVENOUS
Status: DISCONTINUED | OUTPATIENT
Start: 2021-12-28 | End: 2021-12-28 | Stop reason: HOSPADM

## 2021-12-28 RX ORDER — ONDANSETRON 2 MG/ML
4 INJECTION INTRAMUSCULAR; INTRAVENOUS ONCE AS NEEDED
Status: DISCONTINUED | OUTPATIENT
Start: 2021-12-28 | End: 2021-12-28 | Stop reason: HOSPADM

## 2021-12-28 RX ORDER — PROPOFOL 10 MG/ML
VIAL (ML) INTRAVENOUS
Status: DISCONTINUED | OUTPATIENT
Start: 2021-12-28 | End: 2021-12-28

## 2021-12-28 RX ORDER — PHENYLEPHRINE HYDROCHLORIDE 10 MG/ML
INJECTION INTRAVENOUS
Status: DISCONTINUED | OUTPATIENT
Start: 2021-12-28 | End: 2021-12-28

## 2021-12-28 RX ORDER — ONDANSETRON 2 MG/ML
INJECTION INTRAMUSCULAR; INTRAVENOUS
Status: DISCONTINUED | OUTPATIENT
Start: 2021-12-28 | End: 2021-12-28

## 2021-12-28 RX ORDER — HYDROMORPHONE HYDROCHLORIDE 2 MG/ML
0.5 INJECTION, SOLUTION INTRAMUSCULAR; INTRAVENOUS; SUBCUTANEOUS EVERY 5 MIN PRN
Status: DISCONTINUED | OUTPATIENT
Start: 2021-12-28 | End: 2021-12-28 | Stop reason: HOSPADM

## 2021-12-28 RX ORDER — CEFAZOLIN SODIUM 1 G/3ML
INJECTION, POWDER, FOR SOLUTION INTRAMUSCULAR; INTRAVENOUS
Status: DISCONTINUED | OUTPATIENT
Start: 2021-12-28 | End: 2021-12-28

## 2021-12-28 RX ORDER — NEOSTIGMINE METHYLSULFATE 1 MG/ML
INJECTION, SOLUTION INTRAVENOUS
Status: DISCONTINUED | OUTPATIENT
Start: 2021-12-28 | End: 2021-12-28

## 2021-12-28 RX ORDER — MIDAZOLAM HYDROCHLORIDE 1 MG/ML
INJECTION, SOLUTION INTRAMUSCULAR; INTRAVENOUS
Status: DISCONTINUED | OUTPATIENT
Start: 2021-12-28 | End: 2021-12-28

## 2021-12-28 RX ORDER — LIDOCAINE HYDROCHLORIDE 20 MG/ML
INJECTION, SOLUTION EPIDURAL; INFILTRATION; INTRACAUDAL; PERINEURAL
Status: DISCONTINUED | OUTPATIENT
Start: 2021-12-28 | End: 2021-12-28

## 2021-12-28 RX ORDER — DIPHENHYDRAMINE HYDROCHLORIDE 50 MG/ML
25 INJECTION INTRAMUSCULAR; INTRAVENOUS EVERY 6 HOURS PRN
Status: DISCONTINUED | OUTPATIENT
Start: 2021-12-28 | End: 2021-12-28 | Stop reason: HOSPADM

## 2021-12-28 RX ORDER — FENTANYL CITRATE 50 UG/ML
INJECTION, SOLUTION INTRAMUSCULAR; INTRAVENOUS
Status: DISCONTINUED | OUTPATIENT
Start: 2021-12-28 | End: 2021-12-28

## 2021-12-28 RX ORDER — ROCURONIUM BROMIDE 10 MG/ML
INJECTION, SOLUTION INTRAVENOUS
Status: DISCONTINUED | OUTPATIENT
Start: 2021-12-28 | End: 2021-12-28

## 2021-12-28 RX ADMIN — VANCOMYCIN HYDROCHLORIDE 500 MG: 500 INJECTION, POWDER, LYOPHILIZED, FOR SOLUTION INTRAVENOUS at 11:12

## 2021-12-28 RX ADMIN — FENTANYL CITRATE 50 MCG: 50 INJECTION, SOLUTION INTRAMUSCULAR; INTRAVENOUS at 07:12

## 2021-12-28 RX ADMIN — ATORVASTATIN CALCIUM 80 MG: 40 TABLET, FILM COATED ORAL at 09:12

## 2021-12-28 RX ADMIN — METRONIDAZOLE 500 MG: 500 TABLET ORAL at 01:12

## 2021-12-28 RX ADMIN — AMLODIPINE BESYLATE 5 MG: 5 TABLET ORAL at 09:12

## 2021-12-28 RX ADMIN — MIRTAZAPINE 7.5 MG: 7.5 TABLET ORAL at 09:12

## 2021-12-28 RX ADMIN — OXYCODONE HYDROCHLORIDE AND ACETAMINOPHEN 1 TABLET: 5; 325 TABLET ORAL at 09:12

## 2021-12-28 RX ADMIN — ESCITALOPRAM OXALATE 10 MG: 10 TABLET ORAL at 09:12

## 2021-12-28 RX ADMIN — MIDAZOLAM 2 MG: 1 INJECTION INTRAMUSCULAR; INTRAVENOUS at 07:12

## 2021-12-28 RX ADMIN — METRONIDAZOLE 500 MG: 500 TABLET ORAL at 05:12

## 2021-12-28 RX ADMIN — FENTANYL CITRATE 100 MCG: 50 INJECTION, SOLUTION INTRAMUSCULAR; INTRAVENOUS at 07:12

## 2021-12-28 RX ADMIN — OXYCODONE AND ACETAMINOPHEN 1 TABLET: 7.5; 325 TABLET ORAL at 04:12

## 2021-12-28 RX ADMIN — OXYCODONE AND ACETAMINOPHEN 1 TABLET: 7.5; 325 TABLET ORAL at 09:12

## 2021-12-28 RX ADMIN — PREGABALIN 50 MG: 50 CAPSULE ORAL at 03:12

## 2021-12-28 RX ADMIN — PREGABALIN 50 MG: 50 CAPSULE ORAL at 09:12

## 2021-12-28 RX ADMIN — ROCURONIUM BROMIDE 30 MG: 10 INJECTION, SOLUTION INTRAVENOUS at 07:12

## 2021-12-28 RX ADMIN — NEOSTIGMINE METHYLSULFATE 4 MG: 1 INJECTION INTRAVENOUS at 08:12

## 2021-12-28 RX ADMIN — PHENYLEPHRINE HYDROCHLORIDE 100 MCG: 10 INJECTION INTRAVENOUS at 07:12

## 2021-12-28 RX ADMIN — METRONIDAZOLE 500 MG: 500 TABLET ORAL at 09:12

## 2021-12-28 RX ADMIN — OXYCODONE AND ACETAMINOPHEN 1 TABLET: 7.5; 325 TABLET ORAL at 01:12

## 2021-12-28 RX ADMIN — CEFEPIME HYDROCHLORIDE 1 G: 1 INJECTION, SOLUTION INTRAVENOUS at 09:12

## 2021-12-28 RX ADMIN — ALLOPURINOL 100 MG: 100 TABLET ORAL at 09:12

## 2021-12-28 RX ADMIN — LIDOCAINE HYDROCHLORIDE 100 MG: 20 INJECTION, SOLUTION EPIDURAL; INFILTRATION; INTRACAUDAL; PERINEURAL at 07:12

## 2021-12-28 RX ADMIN — ONDANSETRON 4 MG: 2 INJECTION, SOLUTION INTRAMUSCULAR; INTRAVENOUS at 08:12

## 2021-12-28 RX ADMIN — HYDROMORPHONE HYDROCHLORIDE 0.5 MG: 2 INJECTION, SOLUTION INTRAMUSCULAR; INTRAVENOUS; SUBCUTANEOUS at 08:12

## 2021-12-28 RX ADMIN — PROPOFOL 70 MG: 10 INJECTION, EMULSION INTRAVENOUS at 07:12

## 2021-12-28 RX ADMIN — INSULIN DETEMIR 5 UNITS: 100 INJECTION, SOLUTION SUBCUTANEOUS at 09:12

## 2021-12-28 RX ADMIN — FUROSEMIDE 40 MG: 40 TABLET ORAL at 09:12

## 2021-12-28 RX ADMIN — CEFAZOLIN 2 G: 330 INJECTION, POWDER, FOR SOLUTION INTRAMUSCULAR; INTRAVENOUS at 07:12

## 2021-12-28 RX ADMIN — Medication 10 ML: at 10:12

## 2021-12-28 RX ADMIN — GLYCOPYRROLATE 0.6 MG: 0.2 INJECTION, SOLUTION INTRAMUSCULAR; INTRAVITREAL at 08:12

## 2021-12-28 NOTE — PLAN OF CARE
Problem: Adult Inpatient Plan of Care  Goal: Plan of Care Review  Outcome: Ongoing, Progressing   Patient is alert, oriented X4. Care plan explained to patient, she and her  verbalized understanding.     On room air, O2 saturation maintain 96%, no respiratory distress noted. On cardiac monitor, running normal sinus rhythm.     Deny nausea/vomiting/diarrhea. Complaint right leg and foot pain, PRN Percocet given. Due medication given. Educated patient NPO will start at midnight, she and her  verbalized understanding.     Maintain fall risk precaution, bed in lowest position, call light/personal items in reach.  remain at the bedside. Instructed patient call for help as needed. Will continue to monitor.

## 2021-12-28 NOTE — PT/OT/SLP PROGRESS
Physical Therapy      Patient Name:  Suyapa Connelly   MRN:  [id number]    Patient not seen today secondary to Other (Comment) (Pt not seen today - plan for OR this afternoon for LIZZ AN. Pt to be seen post sx.). Will follow-up tomorrow's date.    12/28/2021

## 2021-12-28 NOTE — PROGRESS NOTES
Pharmacokinetic Assessment Follow Up: IV Vancomycin    Vancomycin serum concentration assessment(s):    The trough level was drawn correctly and can be used to guide therapy at this time. The measurement is within the desired definitive target range of 15 to 20 mcg/mL.    Vancomycin Regimen Plan:    Change regimen to Vancomycin 500 mg IV every 24 hours with next serum trough concentration measured at 1000 prior to 3rd dose on 12/30    Drug levels (last 3 results):  Recent Labs   Lab Result Units 12/25/21  2201 12/27/21  0315 12/28/21  0852   Vancomycin, Random ug/mL 20.4 20.8 19.8       Pharmacy will continue to follow and monitor vancomycin.    Please contact pharmacy at extension [phone] for questions regarding this assessment.    Thank you for the consult,   Teofilo Mora       Patient brief summary:  Suyapa Connelly is a 56 y.o. female initiated on antimicrobial therapy with IV Vancomycin for treatment of bone/joint infection    The patient's current regimen is vancomycin 500mg q24h    Drug Allergies:   Review of patient's allergies indicates:   Allergen Reactions    Ciprofloxacin Itching    Contrast media      Kidney injury    Iodine      Kidney injury       Actual Body Weight:   79.2kg    Renal Function:   Estimated Creatinine Clearance: 55.1 mL/min (based on SCr of 1.2 mg/dL).,     Dialysis Method (if applicable):      CBC (last 72 hours):  Recent Labs   Lab Result Units 12/26/21  0844   WBC K/uL 14.79*   Hemoglobin g/dL 8.2*   Hematocrit % 25.7*   Platelets K/uL 632*   Gran % % 74.9*   Lymph % % 11.1*   Mono % % 10.1   Eosinophil % % 2.2   Basophil % % 0.7   Differential Method  Automated       Metabolic Panel (last 72 hours):  Recent Labs   Lab Result Units 12/26/21  0844   Sodium mmol/L 135*   Potassium mmol/L 3.5   Chloride mmol/L 103   CO2 mmol/L 20*   Glucose mg/dL 86   BUN mg/dL 19   Creatinine mg/dL 1.2       Vancomycin Administrations:  vancomycin given in the last 96 hours                      vancomycin 500 mg in dextrose 5 % 100 mL IVPB (ready to mix system) (mg) 500 mg New Bag 12/27/21 1023    vancomycin 750 mg in dextrose 5 % 250 mL IVPB (ready to mix system) (mg) 750 mg New Bag 12/26/21 0321                    Microbiologic Results:  Microbiology Results (last 7 days)       Procedure Component Value Units Date/Time    Urine culture [[id number]] Collected: 12/21/21 2204    Order Status: Completed Specimen: Urine Updated: 12/23/21 0844     Urine Culture, Routine No growth    Narrative:      Specimen Source->Urine

## 2021-12-28 NOTE — PROGRESS NOTES
[facility] Medicine  Telemedicine Progress Note    Patient Name: Suyapa Connelly  MRN: [id number]  Patient Class: IP- Inpatient   Admission Date: 12/21/2021  Length of Stay: 6 days  Attending Physician: Ruben Flood MD  Primary Care Provider: Magen Christensen MD          Subjective:     Principal Problem:Osteomyelitis of right foot        HPI:  Suyapa Connelly is a 57 yo female with a pmh of DM2, PVD, osteomyelitis, right AKA, CKD3, CAD s/p CABG, afib on eliquis. She presented with right foot pain x 1 month. She has an ulcer to her right heel. Presented with dressing to right heel which was removed and had purulent foul smelling drainage noted. The dressing was not changed in 2 months since she last saw Dr. Askew on 10/22/21 for arthrectomy, thrombectomy, and PTA with DCB to right lower extremity. Had not gone to follow up appts. She now has what appears to be dry gangrene to right toes with bone exposure. She has not been compliant with medications and states she had not taken eliquis in over a month. She states she has not been able to stand due to right leg weakness since her left AKA in May 2021. She also has a pressure wound to the lateral right lower leg. She takes her 's percocet for pain, which was helping some.       Overview/Hospital Course:  No notes on file    Subjective/Interval History     Patient still with R foot pain, continuing IV abx until surgical intervention, planned for  Tomorrow (12/28/21). Pain controlled with PRN medications. Patient with no other complaints. NPO at midnight.     Review of Systems    Constitutional: Negative for chills, fatigue, fever.   HENT: Negative for sore throat, trouble swallowing.    Eyes: Negative for photophobia, visual disturbance.   Respiratory: Negative for cough, shortness of breath.    Cardiovascular: Negative for chest pain, palpitations, leg swelling.   Gastrointestinal: Negative for abdominal pain, constipation,  diarrhea, nausea, vomiting.   Endocrine: Negative for cold intolerance, heat intolerance.   Genitourinary: Negative for dysuria, frequency.   Musculoskeletal: Negative for arthralgias, myalgias.   Skin: +wound, +erythema   Neurological: Negative for dizziness, syncope, weakness, light-headedness.   Psychiatric/Behavioral: Negative for confusion, hallucinations, anxiety    Medications  Scheduled Meds:   allopurinoL  100 mg Oral Daily    amLODIPine  5 mg Oral Daily    atorvastatin  80 mg Oral QHS    ceFEPime (MAXIPIME) IVPB  1 g Intravenous Q12H    EScitalopram oxalate  10 mg Oral Daily    furosemide  40 mg Oral Daily    insulin detemir U-100  5 Units Subcutaneous QHS    metroNIDAZOLE  500 mg Oral Q8H    mirtazapine  7.5 mg Oral QHS    polyethylene glycol  17 g Oral Daily    pregabalin  50 mg Oral TID    senna-docusate 8.6-50 mg  1 tablet Oral BID     Continuous Infusions:  PRN Meds:.sodium chloride, acetaminophen, dextrose 50%, dextrose 50%, glucagon (human recombinant), glucose, glucose, hydrALAZINE, HYDROmorphone, influenza, insulin aspart U-100, melatonin, mupirocin, naloxone, ondansetron, oxyCODONE-acetaminophen, pneumoc 13-azam conj-dip cr(PF), sars-cov-2 (covid-19), sodium chloride 0.9%, sodium chloride 0.9%, sodium chloride 0.9%, Pharmacy to dose Vancomycin consult **AND** vancomycin - pharmacy to dose    Objective    Physical Examination    Temp:  [98.3 °F (36.8 °C)-99.5 °F (37.5 °C)]   Pulse:  []   Resp:  [16-18]   BP: (105-144)/(63-99)   SpO2:  [96 %-100 %]     Constitutional: NAD, conversant  Head: NC, AT  Eyes: No scleral icterus.  No eye discharge  Pulmonary/Chest: Effort normal. No respiratory distress on room air  GI: No distension  Musculoskeletal: Moving all extremities  Neurological: Alert.  Oriented to person, place, and time.   Psychiatric: Normal behavior, mood, and affect  Skin: No visible erythema or cyanosis    CBC  Recent Labs   Lab 12/23/21  0604 12/24/21  0654  12/26/21  0844   WBC 11.68 16.08* 14.79*   HGB 6.5* 8.4* 8.2*   HCT 21.4* 26.7* 25.7*   * 678* 632*     CMP  Recent Labs   Lab 12/21/21  1702 12/21/21  1702 12/23/21  0604 12/24/21  0633 12/26/21  0844      < > 138 136 135*   K 4.0   < > 4.0 4.0 3.5      < > 106 103 103   CO2 24   < > 22* 21* 20*   BUN 33*   < > 26* 21* 19   CREATININE 1.6*   < > 1.3 1.3 1.2   *   < > 85 117* 86   CALCIUM 8.1*   < > 8.0* 8.4* 9.0   MG 2.1  --  1.9 1.9  --    LIPASE 8  --   --   --   --    ALKPHOS 132  --   --   --   --    ALT 7*  --   --   --   --    AST 12  --   --   --   --    ALBUMIN 1.6*  --   --   --   --    PROT 7.2  --   --   --   --    BILITOT 0.2  --   --   --   --     < > = values in this interval not displayed.           Assessment/Plan:      * Osteomyelitis of right foot  - Non-healing wounds noted with purulent drainage to heal  - Gangrenous changes to toes, exposed bone noted  - Cont vanc, cefepime, flagyl  - MRI of right foot confirms osteo  - Podiatry assessed, consutled gen sx for BKA  - ID consulted, ok to DC abx after BKA  - Hold eliquis  - Percocet PRN pain        Diabetic ulcer of right foot associated with type 2 diabetes mellitus  - See osteo      Cellulitis  - See osteo      Stage 3 chronic kidney disease due to type 2 diabetes mellitus  - At baseline  - Renally dose meds      Chronic diastolic heart failure  - Reports taking lasix daily for week prior admission  - Cont lasix daily      Chronic anemia  - Hgb drop from 13 to 7 over 2 month period  - Type and screen  - Hold plavix and eliquis (had not been taking)  - Denies acute blood loss  - sp 1 U PRBC 12/23      Critical lower limb ischemia  - Had intervention to RLE on 10/22/21  - Persistent non-healing diabetic right foot wounds   - Arterial US showing stenosis   - Cardiology following  - Plan for surgical intervention on 12/28/21  - Had not been taking eliquis, cont to hold for possible intervention      Paroxysmal atrial  fibrillation  - NSR on tele  - Noncompliant with eliquis  - Cont to hold for possible RLE intervention      Type 2 diabetes mellitus with hyperglycemia, with long-term current use of insulin  - Hold home meds  - Detemir 5u nightly  - Accuchecks and ISS  - A1C 9.6        Essential hypertension  - Started amlodipine 5mg daily  - Monitor pressures      Gangrene  - Dry gangrene right foot  - Podiatry assessed  - Gen surgery consulted for BKA, surgery on Tuesday        VTE Risk Mitigation (From admission, onward)         Ordered     Reason for No Pharmacological VTE Prophylaxis  Once        Question:  Reasons:  Answer:  Already adequately anticoagulated on oral Anticoagulants    12/21/21 1910     IP VTE HIGH RISK PATIENT  Once         12/21/21 1910                      I have assessed these finding virtually using telemed platform and with assistance of bedside nurse       The attending portion of this evaluation, treatment, and documentation was performed per Ruben Flood MD via Telemedicine AudioVisual using the secure opinions.h software platform with 2 way audio/video. The provider was located off-site and the patient is located in the hospital. The aforementioned video software was utilized to document the relevant history and physical exam     Ruben Flood MD  Department of Hospital Medicine   [place] [facility]

## 2021-12-28 NOTE — PT/OT/SLP PROGRESS
Occupational Therapy  Hold OT Evaluation     Patient Name:  Suyapa Connelly   MRN:  [id number]    Patient not seen today secondary to Other (Comment) (not seen today- plan for OR this PM for R NATALIYA. WIll follow up post surgery). Will follow-up tomorrow's date.    12/28/2021

## 2021-12-29 LAB
ANION GAP SERPL CALC-SCNC: 13 MMOL/L (ref 8–16)
BASOPHILS # BLD AUTO: 0.1 K/UL (ref 0–0.2)
BASOPHILS NFR BLD: 0.7 % (ref 0–1.9)
BUN SERPL-MCNC: 21 MG/DL (ref 6–20)
CALCIUM SERPL-MCNC: 7.9 MG/DL (ref 8.7–10.5)
CHLORIDE SERPL-SCNC: 107 MMOL/L (ref 95–110)
CO2 SERPL-SCNC: 18 MMOL/L (ref 23–29)
CREAT SERPL-MCNC: 1.4 MG/DL (ref 0.5–1.4)
DIFFERENTIAL METHOD: ABNORMAL
EOSINOPHIL # BLD AUTO: 0.2 K/UL (ref 0–0.5)
EOSINOPHIL NFR BLD: 1.1 % (ref 0–8)
ERYTHROCYTE [DISTWIDTH] IN BLOOD BY AUTOMATED COUNT: 14.1 % (ref 11.5–14.5)
EST. GFR  (AFRICAN AMERICAN): 49 ML/MIN/1.73 M^2
EST. GFR  (NON AFRICAN AMERICAN): 42 ML/MIN/1.73 M^2
GLUCOSE SERPL-MCNC: 79 MG/DL (ref 70–110)
HCT VFR BLD AUTO: 26.1 % (ref 37–48.5)
HGB BLD-MCNC: 7.9 G/DL (ref 12–16)
IMM GRANULOCYTES # BLD AUTO: 0.1 K/UL (ref 0–0.04)
IMM GRANULOCYTES NFR BLD AUTO: 0.7 % (ref 0–0.5)
LYMPHOCYTES # BLD AUTO: 1.6 K/UL (ref 1–4.8)
LYMPHOCYTES NFR BLD: 11 % (ref 18–48)
MAGNESIUM SERPL-MCNC: 1.9 MG/DL (ref 1.6–2.6)
MCH RBC QN AUTO: 29.2 PG (ref 27–31)
MCHC RBC AUTO-ENTMCNC: 30.3 G/DL (ref 32–36)
MCV RBC AUTO: 96 FL (ref 82–98)
MONOCYTES # BLD AUTO: 1.5 K/UL (ref 0.3–1)
MONOCYTES NFR BLD: 9.9 % (ref 4–15)
NEUTROPHILS # BLD AUTO: 11.4 K/UL (ref 1.8–7.7)
NEUTROPHILS NFR BLD: 76.6 % (ref 38–73)
NRBC BLD-RTO: 0 /100 WBC
PLATELET # BLD AUTO: 603 K/UL (ref 150–450)
PMV BLD AUTO: 10.1 FL (ref 9.2–12.9)
POCT GLUCOSE: 108 MG/DL (ref 70–110)
POCT GLUCOSE: 112 MG/DL (ref 70–110)
POCT GLUCOSE: 99 MG/DL (ref 70–110)
POTASSIUM SERPL-SCNC: 3.7 MMOL/L (ref 3.5–5.1)
RBC # BLD AUTO: 2.71 M/UL (ref 4–5.4)
SODIUM SERPL-SCNC: 138 MMOL/L (ref 136–145)
WBC # BLD AUTO: 14.82 K/UL (ref 3.9–12.7)

## 2021-12-29 PROCEDURE — 25000003 PHARM REV CODE 250: Performed by: NURSE PRACTITIONER

## 2021-12-29 PROCEDURE — 80048 BASIC METABOLIC PNL TOTAL CA: CPT | Performed by: NURSE PRACTITIONER

## 2021-12-29 PROCEDURE — 97530 THERAPEUTIC ACTIVITIES: CPT

## 2021-12-29 PROCEDURE — 27000207 HC ISOLATION

## 2021-12-29 PROCEDURE — 63600175 PHARM REV CODE 636 W HCPCS: Performed by: SURGERY

## 2021-12-29 PROCEDURE — 83735 ASSAY OF MAGNESIUM: CPT | Performed by: NURSE PRACTITIONER

## 2021-12-29 PROCEDURE — 25000003 PHARM REV CODE 250: Performed by: SURGERY

## 2021-12-29 PROCEDURE — 11000001 HC ACUTE MED/SURG PRIVATE ROOM

## 2021-12-29 PROCEDURE — 36415 COLL VENOUS BLD VENIPUNCTURE: CPT | Performed by: NURSE PRACTITIONER

## 2021-12-29 PROCEDURE — C9399 UNCLASSIFIED DRUGS OR BIOLOG: HCPCS | Performed by: NURSE PRACTITIONER

## 2021-12-29 PROCEDURE — 85025 COMPLETE CBC W/AUTO DIFF WBC: CPT | Performed by: NURSE PRACTITIONER

## 2021-12-29 PROCEDURE — 97165 OT EVAL LOW COMPLEX 30 MIN: CPT

## 2021-12-29 PROCEDURE — 63600175 PHARM REV CODE 636 W HCPCS: Performed by: NURSE PRACTITIONER

## 2021-12-29 PROCEDURE — 97161 PT EVAL LOW COMPLEX 20 MIN: CPT

## 2021-12-29 RX ORDER — MORPHINE SULFATE 2 MG/ML
2 INJECTION, SOLUTION INTRAMUSCULAR; INTRAVENOUS EVERY 6 HOURS PRN
Status: DISCONTINUED | OUTPATIENT
Start: 2021-12-29 | End: 2022-01-04

## 2021-12-29 RX ORDER — OXYCODONE AND ACETAMINOPHEN 10; 325 MG/1; MG/1
1 TABLET ORAL EVERY 4 HOURS PRN
Status: DISCONTINUED | OUTPATIENT
Start: 2021-12-29 | End: 2022-01-10 | Stop reason: HOSPADM

## 2021-12-29 RX ADMIN — ALLOPURINOL 100 MG: 100 TABLET ORAL at 08:12

## 2021-12-29 RX ADMIN — FUROSEMIDE 40 MG: 40 TABLET ORAL at 08:12

## 2021-12-29 RX ADMIN — MORPHINE SULFATE 2 MG: 2 INJECTION, SOLUTION INTRAMUSCULAR; INTRAVENOUS at 03:12

## 2021-12-29 RX ADMIN — ESCITALOPRAM OXALATE 10 MG: 10 TABLET ORAL at 08:12

## 2021-12-29 RX ADMIN — AMLODIPINE BESYLATE 5 MG: 5 TABLET ORAL at 08:12

## 2021-12-29 RX ADMIN — METRONIDAZOLE 500 MG: 500 TABLET ORAL at 06:12

## 2021-12-29 RX ADMIN — PREGABALIN 50 MG: 50 CAPSULE ORAL at 10:12

## 2021-12-29 RX ADMIN — OXYCODONE AND ACETAMINOPHEN 1 TABLET: 10; 325 TABLET ORAL at 10:12

## 2021-12-29 RX ADMIN — PREGABALIN 50 MG: 50 CAPSULE ORAL at 09:12

## 2021-12-29 RX ADMIN — OXYCODONE AND ACETAMINOPHEN 1 TABLET: 7.5; 325 TABLET ORAL at 04:12

## 2021-12-29 RX ADMIN — METRONIDAZOLE 500 MG: 500 TABLET ORAL at 10:12

## 2021-12-29 RX ADMIN — METRONIDAZOLE 500 MG: 500 TABLET ORAL at 01:12

## 2021-12-29 RX ADMIN — PREGABALIN 50 MG: 50 CAPSULE ORAL at 03:12

## 2021-12-29 RX ADMIN — VANCOMYCIN HYDROCHLORIDE 500 MG: 500 INJECTION, POWDER, LYOPHILIZED, FOR SOLUTION INTRAVENOUS at 11:12

## 2021-12-29 RX ADMIN — CEFEPIME HYDROCHLORIDE 1 G: 1 INJECTION, SOLUTION INTRAVENOUS at 08:12

## 2021-12-29 RX ADMIN — ATORVASTATIN CALCIUM 80 MG: 40 TABLET, FILM COATED ORAL at 10:12

## 2021-12-29 RX ADMIN — INSULIN DETEMIR 5 UNITS: 100 INJECTION, SOLUTION SUBCUTANEOUS at 10:12

## 2021-12-29 RX ADMIN — MIRTAZAPINE 7.5 MG: 7.5 TABLET ORAL at 10:12

## 2021-12-29 RX ADMIN — OXYCODONE AND ACETAMINOPHEN 1 TABLET: 7.5; 325 TABLET ORAL at 08:12

## 2021-12-29 NOTE — PROGRESS NOTES
[facility] Medicine  Telemedicine Progress Note    Patient Name: Suyapa Connelly  MRN: [id number]  Patient Class: IP- Inpatient   Admission Date: 12/21/2021  Length of Stay: 8 days  Attending Physician: Ruben Flood MD  Primary Care Provider: Magen Christensen MD          Subjective:     Principal Problem:Gangrene        HPI:  Suyapa Connelly is a 57 yo female with a pmh of DM2, PVD, osteomyelitis, right AKA, CKD3, CAD s/p CABG, afib on eliquis. She presented with right foot pain x 1 month. She has an ulcer to her right heel. Presented with dressing to right heel which was removed and had purulent foul smelling drainage noted. The dressing was not changed in 2 months since she last saw Dr. Askew on 10/22/21 for arthrectomy, thrombectomy, and PTA with DCB to right lower extremity. Had not gone to follow up appts. She now has what appears to be dry gangrene to right toes with bone exposure. She has not been compliant with medications and states she had not taken eliquis in over a month. She states she has not been able to stand due to right leg weakness since her left AKA in May 2021. She also has a pressure wound to the lateral right lower leg. She takes her 's percocet for pain, which was helping some.       Overview/Hospital Course:  No notes on file    Interval History: Seen virtually. No complaints.  at bedside. BKA to right leg later this afternoon. NPO.    Review of Systems   Constitutional: Negative for chills and fever.   Respiratory: Negative for cough and shortness of breath.    Cardiovascular: Negative for chest pain and leg swelling.   Gastrointestinal: Negative for abdominal pain and nausea.   Musculoskeletal: Positive for arthralgias and myalgias.        Right foot pain   Skin: Positive for wound.   Neurological: Positive for weakness.     Objective:     Vital Signs (Most Recent):  Temp: 97.9 °F (36.6 °C) (12/28/21 2105)  Pulse: 94 (12/28/21 2130)  Resp: 18  (12/28/21 2150)  BP: (!) 136/55 (12/28/21 2130)  SpO2: 96 % (12/28/21 2130) Vital Signs (24h Range):  Temp:  [97.6 °F (36.4 °C)-98.7 °F (37.1 °C)] 97.9 °F (36.6 °C)  Pulse:  [] 94  Resp:  [11-23] 18  SpO2:  [95 %-100 %] 96 %  BP: (122-151)/(55-93) 136/55     Weight: 79.2 kg (174 lb 9.7 oz)  Body mass index is 29.06 kg/m².    Intake/Output Summary (Last 24 hours) at 12/29/2021 0007  Last data filed at 12/28/2021 0555  Gross per 24 hour   Intake --   Output 201 ml   Net -201 ml      Physical Exam  Vitals and nursing note reviewed.   Constitutional:       General: She is not in acute distress.     Appearance: Normal appearance.   Cardiovascular:      Rate and Rhythm: Normal rate and regular rhythm.      Pulses: Normal pulses.   Pulmonary:      Effort: Pulmonary effort is normal.   Abdominal:      General: There is no distension.   Musculoskeletal:         General: Tenderness present. No swelling.      Cervical back: Normal range of motion.   Skin:     General: Skin is warm and dry.      Findings: Lesion (right foot) present.   Neurological:      Mental Status: She is alert and oriented to person, place, and time.      Motor: Weakness present.         Significant Labs:   All pertinent labs within the past 24 hours have been reviewed.  Blood Culture: No results for input(s): LABBLOO in the last 48 hours.  BMP:   No results for input(s): GLU, NA, K, CL, CO2, BUN, CREATININE, CALCIUM, MG in the last 48 hours.  CBC:   No results for input(s): WBC, HGB, HCT, PLT in the last 48 hours.  CMP:   No results for input(s): NA, K, CL, CO2, GLU, BUN, CREATININE, CALCIUM, PROT, ALBUMIN, BILITOT, ALKPHOS, AST, ALT, ANIONGAP, EGFRNONAA in the last 48 hours.    Invalid input(s): ESTGFAFRICA  Lactic Acid:   No results for input(s): LACTATE in the last 48 hours.    Significant Imaging: I have reviewed all pertinent imaging results/findings within the past 24 hours.      Assessment/Plan:      * Gangrene  - Dry gangrene right foot  -  Podiatry assessed  - Gen surgery consulted, BKA today      Osteomyelitis of right foot  - Non-healing wounds noted with purulent drainage to right heal  - Gangrenous changes to toes, exposed bone noted  - Cont vanc, cefepime, flagyl  - MRI of right foot confirms osteo  - Podiatry assessed, consutled gen sx for BKA--planned for today  - ID consulted, ok to DC abx after BKA  - Hold eliquis  - Percocet PRN pain        Diabetic ulcer of right foot associated with type 2 diabetes mellitus  - See osteo      Cellulitis  - See osteo      Stage 3 chronic kidney disease due to type 2 diabetes mellitus  - At baseline  - Renally dose meds      Chronic diastolic heart failure    - Cont lasix daily      Chronic anemia  - Hgb drop from 13 to 7 over 2 month period  - Type and screen  - Hold plavix and eliquis (had not been taking)  - Denies acute blood loss  - sp 1 U PRBC 12/23      Critical lower limb ischemia  - Had intervention to RLE on 10/22/21  - Persistent non-healing diabetic right foot wounds   - Arterial US showing stenosis   - Cardiology following  - NPO past midnight, BKA today   - Had not been taking eliquis, cont to hold for surgical intervention                Paroxysmal atrial fibrillation  - NSR on tele  - Noncompliant with eliquis  - Cont to hold for possible RLE intervention      Type 2 diabetes mellitus with hyperglycemia, with long-term current use of insulin  - Hold home meds  - Detemir 5u nightly  - Accuchecks and ISS  - A1C 9.6        Essential hypertension  - amlodipine 5mg daily  - Monitor pressures        VTE Risk Mitigation (From admission, onward)         Ordered     Reason for No Pharmacological VTE Prophylaxis  Once        Question:  Reasons:  Answer:  Already adequately anticoagulated on oral Anticoagulants    12/21/21 1910     IP VTE HIGH RISK PATIENT  Once         12/21/21 1910                      I have assessed these finding virtually using telemed platform and with assistance of bedside nurse                  The attending portion of this evaluation, treatment, and documentation was performed per Petra De Anda NP via Telemedicine AudioVisual using the secure zePASS software platform with 2 way audio/video. The provider was located off-site and the patient is located in the hospital. The aforementioned video software was utilized to document the relevant history and physical exam    Petra De Anda NP  Department of [facility] Medicine   [facility]

## 2021-12-29 NOTE — PROGRESS NOTES
Patient has met PACU discharge criteria, VSS, pain well controlled. Family updated by phone. Released from PACU by Dr. Brunner

## 2021-12-29 NOTE — PT/OT/SLP EVAL
Physical Therapy Evaluation    Patient Name:  Suyapa Connelly   MRN:  [id number]    Recommendations:     Discharge Recommendations:   (TBD)   Discharge Equipment Recommendations: other (see comments) (TBD possibly lift device)   Barriers to discharge: Decreased caregiver support    Assessment:     Suyapa Connelly is a 56 y.o. female admitted with a medical diagnosis of Gangrene.  She presents with the following impairments/functional limitations:  weakness,impaired functional mobilty,impaired endurance,impaired sensation,impaired balance,impaired self care skills,decreased lower extremity function,decreased upper extremity function,decreased safety awareness,pain,decreased coordination,decreased ROM,impaired skin,edema .Pt refused OOB activity 2/2 significant post-op pain R BKA. Bed level assessments performed. Pt requires ModA for B rolling. D/c recs pending progress - pt likely to d/c home with 24/7 care/assist from family. Possible need for jany lift pending pt progress.     Rehab Prognosis: Good; patient would benefit from acute skilled PT services to address these deficits and reach maximum level of function.    Recent Surgery: Procedure(s) (LRB):  AMPUTATION, BELOW KNEE (Right) 1 Day Post-Op    Plan:     During this hospitalization, patient to be seen 5 x/week to address the identified rehab impairments via therapeutic activities,therapeutic exercises,neuromuscular re-education,wheelchair management/training and progress toward the following goals:    · Plan of Care Expires:  01/29/22    Subjective     Chief Complaint: post op pain R BKA  Patient/Family Comments/goals: Pt reports poor experiences in previous hospitalizations / rehabs and reports never got home health that she had to figure out everything on her own. Pt reports her  assists her with everything.   Pain/Comfort:  · Pain Rating 1: 10/10  · Location - Side 1: Right  · Location 1:  (BKA incision site)  · Pain Addressed 1: Cessation of  Activity,Pre-medicate for activity  · Pain Rating Post-Intervention 1: 10/10    Patients cultural, spiritual, Shinto conflicts given the current situation: no    Living Environment:  Pt lives with her  in a SSH, ramp to enter, and T/S.   Prior to admission, patients level of function was needing assist for all ADL's from , takes sponge baths, and her  assists her with transfers bed <> WC.  Equipment used at home: wheelchair,hospital bed.  DME owned (not currently used): none.  Upon discharge, patient will have assistance from .    Objective:     Communicated with nurse prior to session.  Patient found HOB elevated with    upon PT entry to room.    General Precautions: Standard, fall   Orthopedic Precautions:N/A   Braces: N/A  Respiratory Status: Room air    Exams:  · Cognitive Exam:  Patient is oriented to Person, Place, Time and Situation  · Sensation:    · -       Intact  light/touch LLE (L AKA) but impaired RLE (R BKA) pt reports 10% sensation  · Skin Integrity/Edema:      · -       Skin integrity: Wound surgical R BKA  · -       Edema: Mild R BKA  · RLE ROM: Unable to / pt unwilling to move R leg 2/2 pain  · RLE Strength: Unable to / pt unwilling to move R leg 2/2 pain  · LLE ROM: WFL (hip only 2/2 L AKA)  · LLE Strength: 4/5 L hip flex/ext/abd/add     Functional Mobility:  · Bed Mobility:     · Rolling Left:  moderate assistance  · Rolling Right: moderate assistance  · Scooting: maximal assistance and of 2 persons via draw sheet, pt assisting by pulling through B UE on bed rail     Therapeutic Activities and Exercises:   educated pt on role of PT/POC and pt only agreeable for bed level assessment and bed mobility 2/2 increased pain s/p R BKA.  B rolling completed for hygiene and bed linen change. PCT in room.   Scooted HOB as reported above.  Educated pt on importance of R knee extension and elevation of residual limb with proper pillow placement to prevent contracture.  Pt  educated on importance of turning schedule to prevent pressure ulcers.     AM-PAC 6 CLICK MOBILITY  Total Score:7     Patient left HOB elevated with all lines intact, call button in reach and bed alarm on, nsg notified.     GOALS:   Multidisciplinary Problems     Physical Therapy Goals        Problem: Physical Therapy Goal    Goal Priority Disciplines Outcome Goal Variances Interventions   Physical Therapy Goal     PT, PT/OT Ongoing, Progressing     Description: Goals to be met by: 22     Patient will increase functional independence with mobility by performin. Supine to sit with Minimal Assistance  2. Sit to supine with Minimal Assistance  3. Rolling to Left and Right with Stand-by Assistance.  4. Bed to WC transfer with Minimal Assistance using Slideboard.                     History:     Past Medical History:   Diagnosis Date    Anxiety     Chronic pain syndrome     CKD (chronic kidney disease), stage III     Depression     Diabetes mellitus     type 2    Diabetes mellitus, type 2     GERD (gastroesophageal reflux disease)     Hyperemesis 3/23/2021    Hyperlipemia     Hypertension     Hypokalemia 3/23/2021    Myocardial infarction     minor-caused by stress per pt.    Osteomyelitis     Osteomyelitis of left foot 2021    PVD (peripheral vascular disease)     Vaginal delivery     x1       Past Surgical History:   Procedure Laterality Date    ABOVE-KNEE AMPUTATION Left 2021    Procedure: AMPUTATION, ABOVE KNEE;  Surgeon: Teddy Huber MD;  Location: [facility] OR [address] [place] [address];  Service: Vascular;  Laterality: Left;    Angiogram - Right Extremity Right 7/9/15    angiogram-left leg  10/6/15    ANGIOGRAPHY OF LOWER EXTREMITY Left 2021    Procedure: ANGIOGRAM, LOWER EXTREMITY;  Surgeon: Teddy Huber MD;  Location: [facility] OR [address] [place] [address];  Service: Vascular;  Laterality: Left;    CATHETERIZATION OF BOTH LEFT AND RIGHT HEART N/A 2019    Procedure: CATHETERIZATION,  HEART, BOTH LEFT AND RIGHT;  Surgeon: Que Fernando III, MD;  Location: [facility] CATH LAB;  Service: Cardiology;  Laterality: N/A;    CORONARY ANGIOGRAPHY N/A 12/18/2019    Procedure: ANGIOGRAM, CORONARY ARTERY;  Surgeon: Que Fernando III, MD;  Location: [facility] CATH LAB;  Service: Cardiology;  Laterality: N/A;    CORONARY ANGIOGRAPHY INCLUDING BYPASS GRAFTS WITH CATHETERIZATION OF LEFT HEART N/A 7/28/2020    Procedure: ANGIOGRAM, CORONARY, INCLUDING BYPASS GRAFT, WITH LEFT HEART CATHETERIZATION, 9 am;  Surgeon: Rachel Easley MD;  Location: [facility] CATH LAB;  Service: Cardiology;  Laterality: N/A;    CORONARY ARTERY BYPASS GRAFT (CABG) N/A 1/14/2020    Procedure: CORONARY ARTERY BYPASS GRAFT (CABG) x 1     Off Pump;  Surgeon: Huang Altamirano MD;  Location: [facility] OR [address] [place] [address];  Service: Cardiovascular;  Laterality: N/A;    CREATION OF FEMORAL-TIBIAL ARTERY BYPASS Left 4/29/2021    Procedure: CREATION, BYPASS, ARTERIAL, FEMORAL TO ANTERIOR TIBIAL;  Surgeon: Teddy Huber MD;  [facility];  Service: Vascular;  Laterality: Left;    CREATION OF FEMOROPOPLITEAL ARTERIAL BYPASS USING GRAFT Left 8/18/2020    Procedure: CREATION, BYPASS, ARTERIAL, FEMORAL TO POPLITEAL, USING GRAFT, LEFT LOWER EXTREMITY;  Surgeon: Teddy Huber MD;  Location: [facility];  Service: Vascular;  Laterality: Left;  REQUEST 7:15 A.M. START----COVID NEGATIVE ON 8/17  1ST CASE JASWANT PER LEANA ON 8/7/2020 @ 942AM-LO  RN PREOP 8/12/2020   T/S-----CLEARED BY CARDS-------PENDING INSURANCE    DEBRIDEMENT OF FOOT Left 9/8/2020    Procedure: DEBRIDEMENT, FOOT;  Surgeon: Rosio Mayes DPM;  Location: [facility] OR;  Service: Podiatry;  Laterality: Left;  request neoxx .   RN Pre Op 9-4-2020, Covid negative on 9/5/20. C A    DEBRIDEMENT OF FOOT  3/4/2021    Procedure: DEBRIDEMENT, FOOT;  Surgeon: Teddy Huber MD;  Location: [facility] OR;  Service: Vascular;;    DEBRIDEMENT OF FOOT Left 3/9/2021    Procedure: DEBRIDEMENT,  FOOT, bone biopsy;  Surgeon: Rosio Mayes DPM;  Location: [facility] OR;  Service: Podiatry;  Laterality: Left;  Request neoxx---COVID IN AM  REQUESTING NOON START  RN Phone Pre op.On Blood thinners Plavix and Eliquis.  Covid am of surgery. C A    DEBRIDEMENT OF FOOT Left 5/4/2021    Procedure: DEBRIDEMENT, FOOT;  Surgeon: Farooq Morley DPM;  Location: [facility] OR 2ND FLR;  Service: Podiatry;  Laterality: Left;    INSERTION OF TUNNELED CENTRAL VENOUS HEMODIALYSIS CATHETER N/A 1/27/2020    Procedure: Insertion, Catheter, Central Venous, Hemodialysis;  Surgeon: ESTEBAN Gomez III, MD;  Location: [facility] CATH LAB;  Service: Peripheral Vascular;  Laterality: N/A;    PERCUTANEOUS TRANSLUMINAL ANGIOPLASTY N/A 3/4/2021    Procedure: PTA (ANGIOPLASTY, PERCUTANEOUS, TRANSLUMINAL);  Surgeon: Teddy Huber MD;  Location: [facility] OR;  Service: Vascular;  Laterality: N/A;    REMOVAL OF ARTERIOVENOUS GRAFT Left 5/27/2021    Procedure: REMOVAL, GRAFT, LEFT LOWER EXTREMITY, WOUND EXPLORATION;  Surgeon: Teddy Huber MD;  [facility];  Service: Vascular;  Laterality: Left;    REMOVAL OF NAIL OF DIGIT Left 3/9/2021    Procedure: REMOVAL, NAIL, DIGIT;  Surgeon: Rosio Mayes DPM;  Location: [facility] OR;  Service: Podiatry;  Laterality: Left;    THROMBECTOMY Left 3/4/2021    Procedure: THROMBECTOMY, LEFT LOWER EXTREMITY BYPASS GRAFT, ANGIOGRAM, POSSIBLE INTERVENTION, POSSIBLE LEFT LOWER EXTREMITY BYPASS;  Surgeon: Teddy Huber MD;  Location: [facility] OR;  Service: Vascular;  Laterality: Left;    THROMBECTOMY Left 4/29/2021    Procedure: GRAFT THROMBECTOMY, LEFT LOWER EXTREMITY;  Surgeon: Teddy Huber MD;  Location: [facility] OR 2ND FLR;  Service: Vascular;  Laterality: Left;  14.5 min  1179.85 mGy  341.01 Gycm2  240 ml dye    THROMBECTOMY  10/22/2021    Procedure: THROMBECTOMY;  Surgeon: Saad Arenas MD;  Location: [facility] CATH LAB/EP;  Service: Cardiology;;       Time Tracking:     PT Received On:  12/29/21  PT Start Time: 0955     PT Stop Time: 1018  PT Total Time (min): 23 min     Billable Minutes: Evaluation 10 and Therapeutic Activity 13 (co-tx with OT)      12/29/2021

## 2021-12-29 NOTE — SUBJECTIVE & OBJECTIVE
"Interval History: ***    Review of Systems  Objective:     Vital Signs (Most Recent):  Temp: 99 °F (37.2 °C) (12/29/21 1211)  Pulse: 99 (12/29/21 1559)  Resp: 18 (12/29/21 1536)  BP: 125/70 (12/29/21 1211)  SpO2: 98 % (12/29/21 1211) Vital Signs (24h Range):  Temp:  [97.9 °F (36.6 °C)-99 °F (37.2 °C)] 99 °F (37.2 °C)  Pulse:  [] 99  Resp:  [11-23] 18  SpO2:  [96 %-100 %] 98 %  BP: (114-151)/(55-93) 125/70     Weight: 79.2 kg (174 lb 9.7 oz)  Body mass index is 29.06 kg/m².    Intake/Output Summary (Last 24 hours) at 12/29/2021 1658  Last data filed at 12/28/2021 2216  Gross per 24 hour   Intake 50 ml   Output --   Net 50 ml      Physical Exam    Significant Labs: {Results:71205::"All pertinent labs within the past 24 hours have been reviewed."}    Significant Imaging: {Imaging Review:72562::"I have reviewed all pertinent imaging results/findings within the past 24 hours."}  "

## 2021-12-29 NOTE — PLAN OF CARE
12/29/21 1736   Post-Acute Status   Post-Acute Authorization Placement   Post-Acute Placement Status Pending medical clearance/testing  (Updated clinicals sent to [facility]. dispo discussed with pt's spouse Randell)   Discharge Plan   Discharge Plan A Rehab

## 2021-12-29 NOTE — ASSESSMENT & PLAN NOTE
- Non-healing wounds noted with purulent drainage to right heal  - Gangrenous changes to toes, exposed bone noted  - Cont vanc, cefepime, flagyl  - MRI of right foot confirms osteo  - Podiatry assessed, consutled gen sx for BKA--planned for today  - ID consulted, ok to DC abx after BKA  - Hold eliquis  - Percocet PRN pain

## 2021-12-29 NOTE — PROGRESS NOTES
"Surgery follow up  /70 (BP Location: Right arm, Patient Position: Lying)   Pulse 99   Temp 99 °F (37.2 °C) (Oral)   Resp 18   Ht 5' 5" (1.651 m)   Wt 79.2 kg (174 lb 9.7 oz)   LMP 09/30/2015 (Exact Date)   SpO2 98%   BMI 29.06 kg/m²   I/O last 3 completed shifts:  In: 100 [IV Piggyback:100]  Out: 201 [Urine:200; Stool:1]  No intake/output data recorded.  Recent Results (from the past 336 hour(s))   CBC auto differential    Collection Time: 12/29/21  7:28 AM   Result Value Ref Range    WBC 14.82 (H) 3.90 - 12.70 K/uL    Hemoglobin 7.9 (L) 12.0 - 16.0 g/dL    Hematocrit 26.1 (L) 37.0 - 48.5 %    Platelets 603 (H) 150 - 450 K/uL   CBC Auto Differential    Collection Time: 12/26/21  8:44 AM   Result Value Ref Range    WBC 14.79 (H) 3.90 - 12.70 K/uL    Hemoglobin 8.2 (L) 12.0 - 16.0 g/dL    Hematocrit 25.7 (L) 37.0 - 48.5 %    Platelets 632 (H) 150 - 450 K/uL   CBC with Automated Differential    Collection Time: 12/24/21  6:33 AM   Result Value Ref Range    WBC 16.08 (H) 3.90 - 12.70 K/uL    Hemoglobin 8.4 (L) 12.0 - 16.0 g/dL    Hematocrit 26.7 (L) 37.0 - 48.5 %    Platelets 678 (H) 150 - 450 K/uL   dressing dry and intact , c/o incisional pain , dressing dry and intact   Change dressing in am.  "

## 2021-12-29 NOTE — PLAN OF CARE
Problem: Physical Therapy Goal  Goal: Physical Therapy Goal  Description: Goals to be met by: 22     Patient will increase functional independence with mobility by performin. Supine to sit with Minimal Assistance  2. Sit to supine with Minimal Assistance  3. Rolling to Left and Right with Stand-by Assistance.  4. Bed to WC transfer with Minimal Assistance using Slideboard.    Outcome: Ongoing, Progressing     PT Eval completed, note to follow. Pt refused OOB activity 2/2 significant post-op pain R BKA. Bed level assessments performed. Pt requires ModA for B rolling. D/c recs pending progress - pt likely to d/c home with  care/assist from family. Possible need for jany lift pending pt progress.

## 2021-12-29 NOTE — OP NOTE
[place] - Surgery ([facility])  Brief Operative Note    SUMMARY     Surgery Date: 12/28/2021     Surgeon(s) and Role:     * Kaitlyn Rojas MD - Primary    Assisting Surgeon: None    Pre-op Diagnosis:  Cold foot [R20.9]  Gangrene [I96]    Post-op Diagnosis:  Post-Op Diagnosis Codes:     * Cold foot [R20.9]     * Gangrene [I96]    Procedure(s) (LRB):  AMPUTATION, BELOW KNEE (Right)  With post splint  Anesthesia: General    Operative Findings:   Operative Note       Surgery Date: 12/28/2021     Surgeon(s) and Role:     * Kaitlyn Rojas MD - Primary    Pre-op Diagnosis:  Cold foot [R20.9]  Gangrene [I96]    Post-op Diagnosis: Post-Op Diagnosis Codes:     * Cold foot [R20.9]     * Gangrene [I96]    Procedure(s) (LRB):  AMPUTATION, BELOW KNEE (Right)    Anesthesia: General    Procedure in Detail/Findings:  After satisfactory general anesthesia and femoral and   popliteal block, right leg was prepped and draped in normal sterile manner using   Betadine scrub solution.  Stockinette was applied.  Short anterior long   posterior flap incision was made 7.5 cm below the tibial tubercle, was taken   down to subcutaneous tissues.  Subcutaneous bleeders clamped and bovied.  Some   of the bleeders were clamped and tied using 2-0 silk ligatures.  Muscle and   fascia was incised at the level of the skin.  All major blood vessels were   suture ligated using 2-0 silk suture ligatures.  The tibia and fibula were then   incised with the help of electric saw 2.5 cm above the skin level.  Posterior   flap was developed, specimen was removed.  The edges of the bone were trimmed.    Hemostasis was satisfactorily maintained.  Wound was thoroughly irrigated with   antibiotic solution and was then approximated using 2-0 Vicryl interrupted for   the fascia, subcutaneous tissue with 3-0 Vicryl.  Skin was closed using skin   staple.  Prior to skin closure, Penrose drain was placed and brought out through   the lateral part of the  incision.  Xeroform, 4 x 4s, Kerlix and sterile gauze   dressing was applied.  Posterior splint was then applied and was placed with the   help of Ace bandage.  Instrument count, sponge count, and needle count was   correct.  The patient tolerated it well.  Estimated blood loss was 50 mL.    Specimen removed was left lower leg.  No intraoperative complications.    Intraoperative findings were diabetic nonhealing infected left transmetatarsal   stump with chronic osteomyelitis, diabetes, hypertension, chronic pain,   peripheral vascular disease, chronic renal failure.      Estimated Blood Loss: 50 cc         Specimens (From admission, onward)    None        Implants: * No implants in log *           Disposition: PACU - hemodynamically stable.           Condition: Good    Attestation:  I performed the procedure.      Patient tolerated well and was sent to recovery room in stable condition.

## 2021-12-29 NOTE — PLAN OF CARE
Recommendation:   1. Reinitiate diabetic diet with added cardiac restriction as tolerated.   2. Addition of Geremias BID to promote wound healing.   3. Continue to provide Beneprotein BID.   4. Weekly weights.   5. RD to follow and monitor intake    Goals:   Pt to receive nutrition by RD follow up    Nutrition Goal Status: new  Communication of RD Recs: other (comment) (POC)      Problem: Oral Intake Inadequate (Acute Kidney Injury/Impairment)  Goal: Optimal Nutrition Intake  Outcome: Ongoing, Progressing     Problem: Impaired Wound Healing  Goal: Optimal Wound Healing  Outcome: Ongoing, Progressing

## 2021-12-29 NOTE — PROGRESS NOTES
"[facility] ([facility]  Adult Nutrition  Progress Note    SUMMARY       Recommendations    Recommendation:   1. Reinitiate diabetic diet with added cardiac restriction as tolerated.   2. Addition of Geremias BID to promote wound healing.   3. Continue to provide Beneprotein BID.   4. Weekly weights.   5. RD to follow and monitor intake    Goals:   Pt to receive nutrition by RD follow up    Nutrition Goal Status: new  Communication of RD Recs: other (comment) (POC)    Assessment and Plan    Critical lower limb ischemia  Contributing Nutrition Diagnosis  Increased protein needs    Related to (etiology):   Wound healing    Signs and Symptoms (as evidenced by):   R leg wound    Interventions:  Collaboration with other providers  Increased protein diet  Modified Beverage- Geremias BID    Nutrition Diagnosis Status:   Continues           Reason for Assessment    Reason For Assessment: RD follow-up  Diagnosis:  (osteomyeltitis)  Relevant Medical History: GERD< HTN, HLD, DM, MI, anxiety, DM, osteomyelitits, PVD, CKD, heart cath, CABG, thrombectomy, L AKA  Interdisciplinary Rounds: did not attend  General Information Comments: Pt currently NPO, scheduled for Right AKA on 12/29. Previously receiving diabetic diet with % intake and Beneprotein BID. PIV. Carlos Score: 16 incision right leg and food. NFPE not completed today 2/2 remote coverage  Nutrition Discharge Planning: d/c on diabetic cardiac diet with Geremias BID for 14 days    Nutrition Risk Screen    Nutrition Risk Screen: no indicators present    Nutrition/Diet History    Food Preferences: no Yazidism or cultural food prefs identified  Food Allergies: NKFA  Factors Affecting Nutritional Intake: NPO    Anthropometrics    Temp: 97.6 °F (36.4 °C)  Height Method: Stated  Height: 5' 5" (165.1 cm)  Height (inches): 65 in  Weight Method: Bed Scale  Weight: 79.2 kg (174 lb 9.7 oz)  Weight (lb): 174.61 lb  Ideal Body Weight (IBW), Female: 125 lb  % Ideal Body Weight, Female " (lb): 141.62 %  BMI (Calculated): 29.1  BMI Grade: 25 - 29.9 - overweight  Amputation %: 16  Total Amputation %: 16  Amputation Ideal Body Weight (IBW), Female (lb): 109 lb  Amputee BMI (kg/m2): 35.17 kg/m2       Lab/Procedures/Meds    Pertinent Labs Reviewed: reviewed  Pertinent Labs Comments: H/H 8.2/25.7, Na 135, CO2 20, eGFR 59, A1C 9.6  Pertinent Medications Reviewed: reviewed  Pertinent Medications Comments: allopurinol, amlodipine, statin, cefepime, escitalopram oxalate, furosemide, insulin detemir, metronidazole, mirtazapine, poly glycol, pregabalin, senna-docusate, vancomyc      Estimated/Assessed Needs    Weight Used For Calorie Calculations: 80.3 kg (177 lb 0.5 oz)  Energy Calorie Requirements (kcal): 1686 (25 kcal/kg with amputation %)  Energy Need Method: Kcal/kg  Protein Requirements: 68g (1.0g/kg with amputation %)  Weight Used For Protein Calculations: 80.3 kg (177 lb 0.5 oz)     Estimated Fluid Requirement Method: RDA Method  RDA Method (mL): 1686  CHO Requirement: 200g      Nutrition Prescription Ordered    Current Diet Order: NPO  Oral Nutrition Supplement: Beneprotein BID    Evaluation of Received Nutrient/Fluid Intake    I/O: 50/201  Energy Calories Required: not meeting needs  Protein Required: not meeting needs  Fluid Required: not meeting needs  Comments: LBM 12/28  % Intake of Estimated Energy Needs: 0 - 25 %  % Meal Intake: NPO    Nutrition Risk    Level of Risk/Frequency of Follow-up:  (2xweekly)     Monitor and Evaluation    Food and Nutrient Intake: food and beverage intake  Food and Nutrient Adminstration: diet order  Knowledge/Beliefs/Attitudes: food and nutrition knowledge/skill  Physical Activity and Function: nutrition-related ADLs and IADLs  Anthropometric Measurements: weight  Biochemical Data, Medical Tests and Procedures: electrolyte and renal panel  Nutrition-Focused Physical Findings: overall appearance     Nutrition Follow-Up    RD Follow-up?: Yes

## 2021-12-29 NOTE — ASSESSMENT & PLAN NOTE
- Had intervention to RLE on 10/22/21  - Persistent non-healing diabetic right foot wounds   - Arterial US showing stenosis   - Cardiology following  - NPO past midnight, BKA today   - Had not been taking eliquis, cont to hold for surgical intervention

## 2021-12-29 NOTE — SUBJECTIVE & OBJECTIVE
Interval History: Seen virtually. No complaints.  at bedside. BKA to right leg later this afternoon. NPO.    Review of Systems   Constitutional: Negative for chills and fever.   Respiratory: Negative for cough and shortness of breath.    Cardiovascular: Negative for chest pain and leg swelling.   Gastrointestinal: Negative for abdominal pain and nausea.   Musculoskeletal: Positive for arthralgias and myalgias.        Right foot pain   Skin: Positive for wound.   Neurological: Positive for weakness.     Objective:     Vital Signs (Most Recent):  Temp: 97.9 °F (36.6 °C) (12/28/21 2105)  Pulse: 94 (12/28/21 2130)  Resp: 18 (12/28/21 2150)  BP: (!) 136/55 (12/28/21 2130)  SpO2: 96 % (12/28/21 2130) Vital Signs (24h Range):  Temp:  [97.6 °F (36.4 °C)-98.7 °F (37.1 °C)] 97.9 °F (36.6 °C)  Pulse:  [] 94  Resp:  [11-23] 18  SpO2:  [95 %-100 %] 96 %  BP: (122-151)/(55-93) 136/55     Weight: 79.2 kg (174 lb 9.7 oz)  Body mass index is 29.06 kg/m².    Intake/Output Summary (Last 24 hours) at 12/29/2021 0007  Last data filed at 12/28/2021 0555  Gross per 24 hour   Intake --   Output 201 ml   Net -201 ml      Physical Exam  Vitals and nursing note reviewed.   Constitutional:       General: She is not in acute distress.     Appearance: Normal appearance.   Cardiovascular:      Rate and Rhythm: Normal rate and regular rhythm.      Pulses: Normal pulses.   Pulmonary:      Effort: Pulmonary effort is normal.   Abdominal:      General: There is no distension.   Musculoskeletal:         General: Tenderness present. No swelling.      Cervical back: Normal range of motion.   Skin:     General: Skin is warm and dry.      Findings: Lesion (right foot) present.   Neurological:      Mental Status: She is alert and oriented to person, place, and time.      Motor: Weakness present.         Significant Labs:   All pertinent labs within the past 24 hours have been reviewed.  Blood Culture: No results for input(s): LABBLOO in the  last 48 hours.  BMP:   No results for input(s): GLU, NA, K, CL, CO2, BUN, CREATININE, CALCIUM, MG in the last 48 hours.  CBC:   No results for input(s): WBC, HGB, HCT, PLT in the last 48 hours.  CMP:   No results for input(s): NA, K, CL, CO2, GLU, BUN, CREATININE, CALCIUM, PROT, ALBUMIN, BILITOT, ALKPHOS, AST, ALT, ANIONGAP, EGFRNONAA in the last 48 hours.    Invalid input(s): ESTGFAFRICA  Lactic Acid:   No results for input(s): LACTATE in the last 48 hours.    Significant Imaging: I have reviewed all pertinent imaging results/findings within the past 24 hours.

## 2021-12-29 NOTE — PLAN OF CARE
Problem: Adult Inpatient Plan of Care  Goal: Plan of Care Review  Outcome: Ongoing, Progressing    Patient came back to unit from PACU. Patient is alert, oriented X4. Care plan explained to patient, she and her  verbalized understanding.     On room air, O2 saturation maintain 88%, no respiratory distress noted. Put patient on 2L of nasal cannula, O2 saturation maintain 97%. On cardiac monitor, running normal sinus rhythm.     Deny nausea/vomiting/diarrhea. Complaint right leg pain, rate 8/10, PRN percocet given. Due medications given.     Maintain fall risk precaution, bed in lowest position. bed alarm on, call light/personal items in reach. Instructed patient call for help as needed. Will continue to monitor.

## 2021-12-29 NOTE — PLAN OF CARE
Pt would benefit from cont OT services in order to maximize functional independence. Recommending TBD pending progress/ongoing assessment. Pt declining EOB/OOB ax this date 2/2 pain. Pt with c/o 10/10 R leg pain. Pt required ModA to roll B for hygiene. Pt required MaxA x2 to scoot to HOB with use of draw sheet & pt using bed rails.     Problem: Occupational Therapy Goal  Goal: Occupational Therapy Goal  Description: Goals to be met by: 1/29/2022     Patient will increase functional independence with ADLs by performing:    UE Dressing with Modified [place].  Grooming while seated with Modified [place].  Toileting from bedside commode with Modified [place] for hygiene and clothing management.   Rolling to Bilateral with Modified [place].   Supine to sit with Modified [place].  Toilet transfer to bedside commode with Minimal Assistance.    Outcome: Ongoing, Progressing

## 2021-12-30 ENCOUNTER — ANESTHESIA (OUTPATIENT)
Dept: CARDIOLOGY | Facility: HOSPITAL | Age: 56
DRG: 240 | End: 2021-12-30
Payer: MEDICARE

## 2021-12-30 ENCOUNTER — ANESTHESIA EVENT (OUTPATIENT)
Dept: CARDIOLOGY | Facility: HOSPITAL | Age: 56
DRG: 240 | End: 2021-12-30
Payer: MEDICARE

## 2021-12-30 PROBLEM — Z89.511 S/P BKA (BELOW KNEE AMPUTATION) UNILATERAL, RIGHT: Status: ACTIVE | Noted: 2021-12-30

## 2021-12-30 LAB
ABO + RH BLD: NORMAL
ANION GAP SERPL CALC-SCNC: 12 MMOL/L (ref 8–16)
BASOPHILS # BLD AUTO: 0.06 K/UL (ref 0–0.2)
BASOPHILS NFR BLD: 0.3 % (ref 0–1.9)
BLD GP AB SCN CELLS X3 SERPL QL: NORMAL
BUN SERPL-MCNC: 22 MG/DL (ref 6–20)
CALCIUM SERPL-MCNC: 8.3 MG/DL (ref 8.7–10.5)
CHLORIDE SERPL-SCNC: 105 MMOL/L (ref 95–110)
CO2 SERPL-SCNC: 19 MMOL/L (ref 23–29)
CREAT SERPL-MCNC: 1.4 MG/DL (ref 0.5–1.4)
DIFFERENTIAL METHOD: ABNORMAL
EOSINOPHIL # BLD AUTO: 0.1 K/UL (ref 0–0.5)
EOSINOPHIL NFR BLD: 0.5 % (ref 0–8)
ERYTHROCYTE [DISTWIDTH] IN BLOOD BY AUTOMATED COUNT: 14.2 % (ref 11.5–14.5)
EST. GFR  (AFRICAN AMERICAN): 49 ML/MIN/1.73 M^2
EST. GFR  (NON AFRICAN AMERICAN): 42 ML/MIN/1.73 M^2
GLUCOSE SERPL-MCNC: 82 MG/DL (ref 70–110)
HCT VFR BLD AUTO: 24.9 % (ref 37–48.5)
HGB BLD-MCNC: 7.5 G/DL (ref 12–16)
IMM GRANULOCYTES # BLD AUTO: 0.11 K/UL (ref 0–0.04)
IMM GRANULOCYTES NFR BLD AUTO: 0.6 % (ref 0–0.5)
LACTATE SERPL-SCNC: 1 MMOL/L (ref 0.5–2.2)
LYMPHOCYTES # BLD AUTO: 1.5 K/UL (ref 1–4.8)
LYMPHOCYTES NFR BLD: 8.6 % (ref 18–48)
MCH RBC QN AUTO: 29.3 PG (ref 27–31)
MCHC RBC AUTO-ENTMCNC: 30.1 G/DL (ref 32–36)
MCV RBC AUTO: 97 FL (ref 82–98)
MONOCYTES # BLD AUTO: 1.8 K/UL (ref 0.3–1)
MONOCYTES NFR BLD: 10 % (ref 4–15)
NEUTROPHILS # BLD AUTO: 14.3 K/UL (ref 1.8–7.7)
NEUTROPHILS NFR BLD: 80 % (ref 38–73)
NRBC BLD-RTO: 0 /100 WBC
PLATELET # BLD AUTO: 593 K/UL (ref 150–450)
PMV BLD AUTO: 10.1 FL (ref 9.2–12.9)
POCT GLUCOSE: 114 MG/DL (ref 70–110)
POCT GLUCOSE: 131 MG/DL (ref 70–110)
POCT GLUCOSE: 60 MG/DL (ref 70–110)
POCT GLUCOSE: 90 MG/DL (ref 70–110)
POCT GLUCOSE: 99 MG/DL (ref 70–110)
POTASSIUM SERPL-SCNC: 3.1 MMOL/L (ref 3.5–5.1)
RBC # BLD AUTO: 2.56 M/UL (ref 4–5.4)
SODIUM SERPL-SCNC: 136 MMOL/L (ref 136–145)
VANCOMYCIN TROUGH SERPL-MCNC: 22.1 UG/ML (ref 10–22)
WBC # BLD AUTO: 17.84 K/UL (ref 3.9–12.7)

## 2021-12-30 PROCEDURE — 87040 BLOOD CULTURE FOR BACTERIA: CPT | Mod: 59 | Performed by: NURSE PRACTITIONER

## 2021-12-30 PROCEDURE — 25000003 PHARM REV CODE 250: Performed by: PSYCHIATRY & NEUROLOGY

## 2021-12-30 PROCEDURE — 93010 EKG 12-LEAD: ICD-10-PCS | Mod: ,,, | Performed by: INTERNAL MEDICINE

## 2021-12-30 PROCEDURE — 25000003 PHARM REV CODE 250: Performed by: SURGERY

## 2021-12-30 PROCEDURE — 36415 COLL VENOUS BLD VENIPUNCTURE: CPT | Performed by: SURGERY

## 2021-12-30 PROCEDURE — 25000003 PHARM REV CODE 250: Performed by: NURSE PRACTITIONER

## 2021-12-30 PROCEDURE — 63600175 PHARM REV CODE 636 W HCPCS: Performed by: NURSE PRACTITIONER

## 2021-12-30 PROCEDURE — 83605 ASSAY OF LACTIC ACID: CPT | Performed by: NURSE PRACTITIONER

## 2021-12-30 PROCEDURE — 99223 1ST HOSP IP/OBS HIGH 75: CPT | Mod: ,,, | Performed by: PSYCHIATRY & NEUROLOGY

## 2021-12-30 PROCEDURE — 93005 ELECTROCARDIOGRAM TRACING: CPT

## 2021-12-30 PROCEDURE — 99223 PR INITIAL HOSPITAL CARE,LEVL III: ICD-10-PCS | Mod: ,,, | Performed by: PSYCHIATRY & NEUROLOGY

## 2021-12-30 PROCEDURE — 36415 COLL VENOUS BLD VENIPUNCTURE: CPT | Performed by: NURSE PRACTITIONER

## 2021-12-30 PROCEDURE — 80048 BASIC METABOLIC PNL TOTAL CA: CPT | Performed by: NURSE PRACTITIONER

## 2021-12-30 PROCEDURE — 93010 ELECTROCARDIOGRAM REPORT: CPT | Mod: ,,, | Performed by: INTERNAL MEDICINE

## 2021-12-30 PROCEDURE — 86850 RBC ANTIBODY SCREEN: CPT | Performed by: NURSE PRACTITIONER

## 2021-12-30 PROCEDURE — 11000001 HC ACUTE MED/SURG PRIVATE ROOM

## 2021-12-30 PROCEDURE — 36000 PLACE NEEDLE IN VEIN: CPT | Performed by: STUDENT IN AN ORGANIZED HEALTH CARE EDUCATION/TRAINING PROGRAM

## 2021-12-30 PROCEDURE — 90833 PSYTX W PT W E/M 30 MIN: CPT | Mod: ,,, | Performed by: PSYCHIATRY & NEUROLOGY

## 2021-12-30 PROCEDURE — 97530 THERAPEUTIC ACTIVITIES: CPT

## 2021-12-30 PROCEDURE — 85025 COMPLETE CBC W/AUTO DIFF WBC: CPT | Performed by: NURSE PRACTITIONER

## 2021-12-30 PROCEDURE — 80202 ASSAY OF VANCOMYCIN: CPT | Performed by: SURGERY

## 2021-12-30 PROCEDURE — 86920 COMPATIBILITY TEST SPIN: CPT | Performed by: NURSE PRACTITIONER

## 2021-12-30 PROCEDURE — 36410 VNPNXR 3YR/> PHY/QHP DX/THER: CPT

## 2021-12-30 PROCEDURE — 90833 PR PSYCHOTHERAPY W/PATIENT W/E&M, 30 MIN (ADD ON): ICD-10-PCS | Mod: ,,, | Performed by: PSYCHIATRY & NEUROLOGY

## 2021-12-30 PROCEDURE — 27000207 HC ISOLATION

## 2021-12-30 RX ORDER — MIRTAZAPINE 7.5 MG/1
15 TABLET, FILM COATED ORAL NIGHTLY
Status: DISCONTINUED | OUTPATIENT
Start: 2021-12-30 | End: 2022-01-10 | Stop reason: HOSPADM

## 2021-12-30 RX ORDER — SERTRALINE HYDROCHLORIDE 50 MG/1
50 TABLET, FILM COATED ORAL DAILY
Status: DISCONTINUED | OUTPATIENT
Start: 2021-12-31 | End: 2022-01-10 | Stop reason: HOSPADM

## 2021-12-30 RX ORDER — POTASSIUM CHLORIDE 20 MEQ/1
40 TABLET, EXTENDED RELEASE ORAL ONCE
Status: COMPLETED | OUTPATIENT
Start: 2021-12-30 | End: 2021-12-30

## 2021-12-30 RX ADMIN — OXYCODONE AND ACETAMINOPHEN 1 TABLET: 10; 325 TABLET ORAL at 07:12

## 2021-12-30 RX ADMIN — PIPERACILLIN AND TAZOBACTAM 4.5 G: 4; .5 INJECTION, POWDER, LYOPHILIZED, FOR SOLUTION INTRAVENOUS; PARENTERAL at 04:12

## 2021-12-30 RX ADMIN — SODIUM CHLORIDE, SODIUM LACTATE, POTASSIUM CHLORIDE, AND CALCIUM CHLORIDE 500 ML: .6; .31; .03; .02 INJECTION, SOLUTION INTRAVENOUS at 11:12

## 2021-12-30 RX ADMIN — MIRTAZAPINE 15 MG: 7.5 TABLET ORAL at 09:12

## 2021-12-30 RX ADMIN — PREGABALIN 50 MG: 50 CAPSULE ORAL at 02:12

## 2021-12-30 RX ADMIN — MORPHINE SULFATE 2 MG: 2 INJECTION, SOLUTION INTRAMUSCULAR; INTRAVENOUS at 01:12

## 2021-12-30 RX ADMIN — MELATONIN TAB 3 MG 6 MG: 3 TAB at 09:12

## 2021-12-30 RX ADMIN — PREGABALIN 50 MG: 50 CAPSULE ORAL at 09:12

## 2021-12-30 RX ADMIN — POTASSIUM CHLORIDE 40 MEQ: 1500 TABLET, EXTENDED RELEASE ORAL at 11:12

## 2021-12-30 RX ADMIN — MORPHINE SULFATE 2 MG: 2 INJECTION, SOLUTION INTRAMUSCULAR; INTRAVENOUS at 09:12

## 2021-12-30 RX ADMIN — MORPHINE SULFATE 2 MG: 2 INJECTION, SOLUTION INTRAMUSCULAR; INTRAVENOUS at 04:12

## 2021-12-30 RX ADMIN — ATORVASTATIN CALCIUM 80 MG: 40 TABLET, FILM COATED ORAL at 09:12

## 2021-12-30 RX ADMIN — ESCITALOPRAM OXALATE 10 MG: 10 TABLET ORAL at 09:12

## 2021-12-30 RX ADMIN — OXYCODONE AND ACETAMINOPHEN 1 TABLET: 10; 325 TABLET ORAL at 10:12

## 2021-12-30 RX ADMIN — ALLOPURINOL 100 MG: 100 TABLET ORAL at 09:12

## 2021-12-30 RX ADMIN — FUROSEMIDE 40 MG: 40 TABLET ORAL at 09:12

## 2021-12-30 RX ADMIN — INSULIN DETEMIR 5 UNITS: 100 INJECTION, SOLUTION SUBCUTANEOUS at 09:12

## 2021-12-30 RX ADMIN — OXYCODONE AND ACETAMINOPHEN 1 TABLET: 10; 325 TABLET ORAL at 02:12

## 2021-12-30 NOTE — ASSESSMENT & PLAN NOTE
- Hgb drop from 13 to 7 over 2 month period  - Type and screen  - Hold plavix and eliquis (had not been taking)  - Denies acute blood loss  - s/p 1 U PRBC 12/23

## 2021-12-30 NOTE — PROGRESS NOTES
[facility] Medicine  Telemedicine Progress Note    Patient Name: Suyapa Connelly  MRN: [id number]  Patient Class: IP- Inpatient   Admission Date: 12/21/2021  Length of Stay: 9 days  Attending Physician: Ruben Flood MD  Primary Care Provider: Magen Christensen MD          Subjective:     Principal Problem:Gangrene        HPI:  Suyapa Connelly is a 55 yo female with a pmh of DM2, PVD, osteomyelitis, right AKA, CKD3, CAD s/p CABG, afib on eliquis. She presented with right foot pain x 1 month. She has an ulcer to her right heel. Presented with dressing to right heel which was removed and had purulent foul smelling drainage noted. The dressing was not changed in 2 months since she last saw Dr. Askew on 10/22/21 for arthrectomy, thrombectomy, and PTA with DCB to right lower extremity. Had not gone to follow up appts. She now has what appears to be dry gangrene to right toes with bone exposure. She has not been compliant with medications and states she had not taken eliquis in over a month. She states she has not been able to stand due to right leg weakness since her left AKA in May 2021. She also has a pressure wound to the lateral right lower leg. She takes her 's percocet for pain, which was helping some.       Overview/Hospital Course:  No notes on file    Interval History: Seen virtually. Had right BKA yesterday evening. No acute events overnight.   Review of Systems   Constitutional: Negative for chills and fever.   Respiratory: Negative for cough and shortness of breath.    Cardiovascular: Negative for chest pain and leg swelling.   Gastrointestinal: Negative for abdominal pain and nausea.   Genitourinary: Negative for difficulty urinating.   Musculoskeletal: Positive for arthralgias and myalgias.        Right leg pain   Skin: Positive for wound.   Neurological: Positive for weakness.   Psychiatric/Behavioral:        Depressed     Objective:     Vital Signs (Most Recent):  Temp:  98.4 °F (36.9 °C) (12/30/21 0029)  Pulse: 99 (12/30/21 0029)  Resp: 18 (12/30/21 0115)  BP: (!) 107/59 (12/30/21 0029)  SpO2: 100 % (12/30/21 0029) Vital Signs (24h Range):  Temp:  [98.4 °F (36.9 °C)-100.5 °F (38.1 °C)] 98.4 °F (36.9 °C)  Pulse:  [] 99  Resp:  [16-18] 18  SpO2:  [96 %-100 %] 100 %  BP: (104-136)/(59-84) 107/59     Weight: 79.2 kg (174 lb 9.7 oz)  Body mass index is 29.06 kg/m².  No intake or output data in the 24 hours ending 12/30/21 0214   Physical Exam  Vitals and nursing note reviewed.   Constitutional:       General: She is not in acute distress.     Appearance: Normal appearance.   Cardiovascular:      Rate and Rhythm: Normal rate.   Pulmonary:      Effort: Pulmonary effort is normal. No respiratory distress.   Abdominal:      General: There is no distension.   Musculoskeletal:         General: No swelling.      Cervical back: Normal range of motion.      Comments: Bilateral leg amputations   Skin:     General: Skin is dry.      Comments: Right leg BKA   Neurological:      Mental Status: She is alert and oriented to person, place, and time.      Motor: Weakness present.   Psychiatric:         Behavior: Behavior normal.         Thought Content: Thought content normal.      Comments: Depressed about losing leg         Significant Labs:   All pertinent labs within the past 24 hours have been reviewed.  Blood Culture: No results for input(s): LABBLOO in the last 48 hours.  BMP:   Recent Labs   Lab 12/29/21 0728   GLU 79      K 3.7      CO2 18*   BUN 21*   CREATININE 1.4   CALCIUM 7.9*   MG 1.9     CBC:   Recent Labs   Lab 12/29/21 0728   WBC 14.82*   HGB 7.9*   HCT 26.1*   *     CMP:   Recent Labs   Lab 12/29/21 0728      K 3.7      CO2 18*   GLU 79   BUN 21*   CREATININE 1.4   CALCIUM 7.9*   ANIONGAP 13   EGFRNONAA 42*     Lactic Acid:   No results for input(s): LACTATE in the last 48 hours.    Significant Imaging: I have reviewed all pertinent imaging  results/findings within the past 24 hours.      Assessment/Plan:      * Gangrene  - Dry gangrene right foot  - Podiatry assessed  - Gen surgery consulted, BKA on 12/28      Osteomyelitis of right foot  - Non-healing wounds noted with purulent drainage to right heal  - Gangrenous changes to toes, exposed bone noted  - MRI of right foot with osteo  - Podiatry assessed, consulted gen sx for BKA--done 12/28  - ID consulted -- antibiotics discontinued s/p BKA  - Hold eliquis  - Percocet adjusted for pain, add PRN morphine  -add nutritional supplements for would healing  -consult PT/OT  -stop antibiotics s/o BKA  -pending IPR placement            Diabetic ulcer of right foot associated with type 2 diabetes mellitus  - See osteo      Cellulitis  - See osteo  -resolved  -D/C antibiotics s/p amputation      Stage 3 chronic kidney disease due to type 2 diabetes mellitus  - At baseline  - Renally dose meds      Chronic diastolic heart failure    - Cont lasix daily      Chronic anemia  - Hgb drop from 13 to 7 over 2 month period  - Type and screen  - Hold plavix and eliquis (had not been taking)  - Denies acute blood loss  - s/p 1 U PRBC 12/23      Critical lower limb ischemia  - Had intervention to RLE in October  - Persistent non-healing diabetic right foot wounds   - Arterial US showing stenosis   - Cardiology following  - BKA on 12/28/21              Paroxysmal atrial fibrillation  - NSR on tele  - Noncompliant with eliquis  - Cont to hold for now s/p RLE amptutation      Type 2 diabetes mellitus with hyperglycemia, with long-term current use of insulin  - Hold home meds  - Detemir 5u nightly  - Accuchecks and ISS  - A1C 9.6        MDD (major depressive disorder), recurrent episode, moderate  Consult psych for eval      Essential hypertension  - amlodipine 5mg daily  - Monitor pressures        VTE Risk Mitigation (From admission, onward)         Ordered     Reason for No Pharmacological VTE Prophylaxis  Once         Question:  Reasons:  Answer:  Already adequately anticoagulated on oral Anticoagulants    12/21/21 1910     IP VTE HIGH RISK PATIENT  Once         12/21/21 1910                      I have assessed these finding virtually using telemed platform and with assistance of bedside nurse                 The attending portion of this evaluation, treatment, and documentation was performed per Petra De Anda NP via Telemedicine AudioVisual using the secure mydala software platform with 2 way audio/video. The provider was located off-site and the patient is located in the hospital. The aforementioned video software was utilized to document the relevant history and physical exam    Petra De Anda NP  Department of [facility] Medicine   [facility]

## 2021-12-30 NOTE — PLAN OF CARE
Problem: Physical Therapy Goal  Goal: Physical Therapy Goal  Description: Goals to be met by: 22     Patient will increase functional independence with mobility by performin. Supine to sit with Minimal Assistance  2. Sit to supine with Minimal Assistance  3. Rolling to Left and Right with Stand-by Assistance.  4. Bed to WC transfer with Minimal Assistance using Slideboard.    Outcome: Ongoing, Progressing     Pt reports 10/10 post-op pain R BKA at rest during AM session and 6/10 at rest when seen in PM. Pt reporting 8/10 at end of PM session. Pt with fair activity tolerance. Pt requires MaxA x 2 for bed <> WC tf. Pt tolerated sitting up in WC for 1.5 hrs. Pt refuses to use slideboard for tf despite max encouragement and education on possible benefits and increased safety with use. IPR upon d/c.

## 2021-12-30 NOTE — CONSULTS
[facility] - Telemetry  Adult Nutrition  Consult Note    SUMMARY     Recommendations  1) Continue Geremias BID, Beneprotein, and Boost Glucose Control TID to promote wound healing     2) Continue renal diet, if PO intake < 50% meals, consider liberalization to cardiac.    Goals: Pt to receive nutrition by RD follow up  Nutrition Goal Status: goal met  Communication of RD Recs: other (comment) (POC)    Assessment and Plan    Critical lower limb ischemia  Contributing Nutrition Diagnosis  Increased protein needs    Related to (etiology):   Wound healing    Signs and Symptoms (as evidenced by):   R leg wound    Interventions:  Collaboration with other providers  Increased protein diet  Modified Beverage- Geremias BID  Renal diet  Commercial Beverage - Boost Glucose Control  Nutrition Education    Nutrition Diagnosis Status:   Continues    Reason for Assessment  Reason For Assessment: consult (altered skin integrity)  Diagnosis:  (gangrene)  Relevant Medical History: GERD, HTN, HLD, DM2, MI, anxiety, depression, PVD, CKD, CABG, a-fib, s/p L AKA 5/18/21, now s/p R BKA 12/28/21  Interdisciplinary Rounds: did not attend  General Information Comments: Spoke with pt on room phone, reports poor appetite, but eating 50-75% meals, having trouble ordering meals on contact precautions - communicated preferences to kitchen, does drink Geremias and Boost when provided. Denies N/V/D/C, LBM 12/28 with use of stool softeners. S/p R BKA, educated on importance of nutrition during wound healing, supplement use and controlling BG to optimize wound healing. DM education handouts attached to discharge documents. Unable to complete NFPE due to remote assessment. Wt loss noted - 2/2 BKA.  Nutrition Discharge Planning: d/c on diabetic cardiac diet with Geremias BID for 14 days    Nutrition Risk Screen  Nutrition Risk Screen: no indicators present    Nutrition/Diet History  Food Preferences: no Yarsani or cultural food prefs identified  Spiritual,  "Cultural Beliefs, Jew Practices, Values that Affect Care: no  Food Allergies: NKFA  Factors Affecting Nutritional Intake: NPO    Anthropometrics  Temp: 98.1 °F (36.7 °C)  Height Method: Stated  Height: 5' 5" (165.1 cm)  Height (inches): 65 in  Weight Method: Bed Scale  Weight: 79.2 kg (174 lb 9.7 oz)  Weight (lb): 174.61 lb  Ideal Body Weight (IBW), Female: 125 lb  % Ideal Body Weight, Female (lb): 141.62 %  BMI (Calculated): 29.1  BMI Grade: 25 - 29.9 - overweight  Amputation %: 16  Total Amputation %: 16  Amputation Ideal Body Weight (IBW), Female (lb): 109 lb  Amputee BMI (kg/m2): 35.17 kg/m2     Lab/Procedures/Meds  Pertinent Labs Reviewed: reviewed  Pertinent Labs Comments: K 3.1, BUN 22, Cr 1.4, GFR 49, POC BG ,  Pertinent Medications Reviewed: reviewed  Pertinent Medications Comments: allopurinol, atorvastatin, furosemide, insulin, lactated Ringers, polyethylene glycol, KCl, senna-docusate    Estimated/Assessed Needs  Weight Used For Calorie Calculations: 80.3 kg (177 lb 0.5 oz)  Energy Calorie Requirements (kcal): 1686 (25 kcal/kg with amputation %)  Energy Need Method: Kcal/kg  Protein Requirements: 68g (1.0g/kg with amputation %)  Weight Used For Protein Calculations: 80.3 kg (177 lb 0.5 oz)  Estimated Fluid Requirement Method: RDA Method  RDA Method (mL): 1686  CHO Requirement: 200g    Nutrition Prescription Ordered  Current Diet Order: Renal  Oral Nutrition Supplement: Beneprotein BID    Evaluation of Received Nutrient/Fluid Intake  I/O: 50/201  Energy Calories Required: meeting needs  Protein Required: meeting needs  Fluid Required: meeting needs  Comments: LBM 12/28 (with stool softeners)  Tolerance: tolerating  % Intake of Estimated Energy Needs: 50 - 75 %  % Meal Intake: 50 - 75 %    Nutrition Risk  Level of Risk/Frequency of Follow-up: low (follow up weekly)     Monitor and Evaluation  Food and Nutrient Intake: food and beverage intake  Food and Nutrient Adminstration: diet " order  Knowledge/Beliefs/Attitudes: food and nutrition knowledge/skill  Physical Activity and Function: nutrition-related ADLs and IADLs  Anthropometric Measurements: weight  Biochemical Data, Medical Tests and Procedures: electrolyte and renal panel  Nutrition-Focused Physical Findings: overall appearance     Nutrition Follow-Up  RD Follow-up?: Yes

## 2021-12-30 NOTE — PROGRESS NOTES
[facility]                   Pharmacy  Pharmacy Vancomycin/AG Sign-off    Therapy with vancomycin complete and/or consult discontinued by provider.  Pharmacy will sign off, please re-consult as needed. Thank you for allowing us to participate in this patient's care.     Km Mora, PharmD  [phone]

## 2021-12-30 NOTE — PLAN OF CARE
Recommendations  1) Continue Geremias BID, Beneprotein, and Boost Glucose Control TID to promote wound healing     2) Continue renal diet, if PO intake < 50% meals, consider liberalization to cardiac.    Goals: Pt to receive nutrition by RD follow up  Nutrition Goal Status: goal met  Communication of RD Recs: other (comment) (POC)    Assessment and Plan    Critical lower limb ischemia  Contributing Nutrition Diagnosis  Increased protein needs    Related to (etiology):   Wound healing    Signs and Symptoms (as evidenced by):   R leg wound    Interventions:  Collaboration with other providers  Increased protein diet  Modified Beverage- Geremias BID  Renal diet  Commercial Beverage - Boost Glucose Control  Nutrition Education    Nutrition Diagnosis Status:   Continues

## 2021-12-30 NOTE — ANESTHESIA PROCEDURE NOTES
Peripheral IV Insertion    Diagnosis: I99.8 Other disorder of circulatory system    Patient location during procedure: OB    Staffing  Authorizing Provider: Dheeraj Danielle MD  Performing Provider: Dheeraj Danielle MD    Anesthesiologist was present at the time of the procedure.    Preanesthetic Checklist  Completed: patient identified, IV checked, site marked, risks and benefits discussed, surgical consent, monitors and equipment checked, pre-op evaluation, timeout performed and anesthesia consent givenPeripheral IV Insertion  Skin Prep: alcohol swabs  Local Infiltration: none  Orientation: right  Location: basilic  Catheter Type: peripheral IV (single lumen)  Catheter Size: 20 G  Catheter placement by Ultrasound guidance. Heme positive aspiration all ports.  Vessel Caliber: medium, patent, compressibility normal  Needle advanced into vessel with real time Ultrasound guidance.Insertion Attempts: 1  Assessment  Dressing: secured with tape and tegaderm  Patient: Tolerated well  Line flushed easily.

## 2021-12-30 NOTE — PLAN OF CARE
Pt progressing towards goals. Pt seen x2 sessions this date with PT. Pt required ModA for sup>sit. Pt required MaxA x2 for scoot t/f bed<>WC to L side both ways. Pt with c/o 10/10 pain in AM, & 6/10 PM at incision site. Per chart plan is for pt to d/c to IPR.     Problem: Occupational Therapy Goal  Goal: Occupational Therapy Goal  Description: Goals to be met by: 1/29/2022     Patient will increase functional independence with ADLs by performing:    UE Dressing with Modified [place].  Grooming while seated with Modified [place].  Toileting from bedside commode with Modified [place] for hygiene and clothing management.   Rolling to Bilateral with Modified [place].   Supine to sit with Modified [place].  Toilet transfer to bedside commode with Minimal Assistance.    Outcome: Ongoing, Progressing

## 2021-12-30 NOTE — PT/OT/SLP PROGRESS
Occupational Therapy   Treatment    Name: Suyapa Connelly  MRN: [id number]  Admitting Diagnosis:  Gangrene  2 Days Post-Op    Recommendations:     Discharge Recommendations: rehabilitation facility  Discharge Equipment Recommendations:  other (see comments) (defer to next level of care)  Barriers to discharge:  Other (Comment) (Pt requires increased assistance at this time)    Assessment:     Suyapa Connelly is a 56 y.o. female with a medical diagnosis of Gangrene.  She presents with The primary encounter diagnosis was Gangrene. Diagnoses of Pain, Cold foot, Cellulitis, unspecified cellulitis site, Osteomyelitis of right foot, unspecified type, Chest pain, Uremia, Osteomyelitis of right foot, Gangrene of extremity, Diabetic ulcer of right foot associated with type 2 diabetes mellitus, with necrosis of muscle, unspecified part of foot, Diabetic ulcer of right midfoot associated with type 2 diabetes mellitus, with muscle involvement without evidence of necrosis, and QT prolongation were also pertinent to this visit.Performance deficits affecting function are weakness,impaired endurance,gait instability,decreased safety awareness,decreased coordination,impaired balance,impaired self care skills,impaired functional mobilty,decreased lower extremity function,pain,impaired skin,edema.     Pt progressing towards goals. Pt seen x2 sessions this date with PT. Pt required ModA for sup>sit. Pt required MaxA x2 for scoot t/f bed<>WC to L side both ways. Pt with c/o 10/10 pain in AM, & 6/10 PM at incision site. Per chart plan is for pt to d/c to IPR.     Rehab Prognosis:  Good; patient would benefit from acute skilled OT services to address these deficits and reach maximum level of function.       Plan:     Patient to be seen 3 x/week to address the above listed problems via self-care/home management,therapeutic activities,therapeutic exercises  · Plan of Care Expires: 01/29/22  · Plan of Care Reviewed with:  patient    Subjective     Pain/Comfort:  · Pain Rating 1: 10/10 (AM)  · Location - Side 1: Right  · Location 1:  (BKA incision site)  · Pain Addressed 1: Pre-medicate for activity,Reposition,Distraction,Cessation of Activity,Nurse notified  · Pain Rating Post-Intervention 1: 8/10    Objective:     Communicated with: nsg prior to session.  Patient found HOB elevated with bed alarm,peripheral IV,PureWick upon OT entry to room.    General Precautions: Standard, fall   Orthopedic Precautions:N/A   Braces: N/A  Respiratory Status: Room air     Occupational Performance:     Bed Mobility:    · Patient completed Scooting/Bridging with MaxA to scoot laterally along EOB  · Patient completed Supine to Sit with moderate assistance  · Patient completed Sit to Supine with moderate assistance     Functional Mobility/Transfers:  · Patient completed Bed <> WheelChair Transfer using Scoot Pivot technique with maximal assistance and of 2 persons with hand-held assist. Pt declining to use slide board despite max encouragement/education.     Activities of Daily Living:  · Feeding:  modified independence sitting up in WC      [facility] 6 Click ADL: 17    Treatment & Education:  Pt progressing towards goals.   Pt seen x2 sessions this date with PT.   Pt required ModA for sup>sit.   Pt required MaxA x2 for scoot t/f bed<>WC to L side both ways.   Pt sat up in WC ~1.5 hrs with RLE elevated.  Pt with c/o 10/10 pain in AM, & 6/10 PM at incision site.   Per chart plan is for pt to d/c to IPR.     Patient left HOB elevated with all lines intact, call button in reach, bed alarm on and nsg notifiedEducation:      GOALS:   Multidisciplinary Problems     Occupational Therapy Goals        Problem: Occupational Therapy Goal    Goal Priority Disciplines Outcome Interventions   Occupational Therapy Goal     OT, PT/OT Ongoing, Progressing    Description: Goals to be met by: 1/29/2022     Patient will increase functional independence with ADLs by  performing:    UE Dressing with Modified [place].  Grooming while seated with Modified [place].  Toileting from bedside commode with Modified [place] for hygiene and clothing management.   Rolling to Bilateral with Modified [place].   Supine to sit with Modified [place].  Toilet transfer to bedside commode with Minimal Assistance.                     Time Tracking:     OT Date of Treatment: 12/30/21  OT Start Time: 1102 (1303)  OT Stop Time: 1129 (1318)  OT Total Time (min): 27 min 15 min PM    Billable Minutes:Therapeutic Activity 15 cotx with PT    OT/MACKENZIE: OT     MACKENZIE Visit Number: 0    12/30/2021

## 2021-12-30 NOTE — ASSESSMENT & PLAN NOTE
- Non-healing wounds noted with purulent drainage to right heal  - Gangrenous changes to toes, exposed bone noted  - MRI of right foot with osteo  - Podiatry assessed, consulted gen sx for BKA--done 12/28  - ID consulted -- antibiotics discontinued s/p BKA  - Hold eliquis  - Percocet adjusted for pain, add PRN morphine  -add nutritional supplements for would healing  -consult PT/OT  -stop antibiotics s/o BKA  -pending IPR placement

## 2021-12-30 NOTE — PLAN OF CARE
Patient AAOx4. Tearful at times. Aggravated with her condition.  Complaints of pain to right BKA. Meds per MAR.  Hygeine provided. Purewick in place. Voided with our concerns. Incontinent at times.  Plan of care reviewed with patient. Patient verbalizes understandings. Psyc consult pending.  Low grade temp in beginning of shift.

## 2021-12-30 NOTE — PROGRESS NOTES
"Surgery follow up  BP (!) 100/58 (BP Location: Right arm, Patient Position: Lying)   Pulse 94   Temp 98.1 °F (36.7 °C) (Oral)   Resp 18   Ht 5' 5" (1.651 m)   Wt 79.2 kg (174 lb 9.7 oz)   LMP 09/30/2015 (Exact Date)   SpO2 98%   BMI 29.06 kg/m²   I/O last 3 completed shifts:  In: 50 [IV Piggyback:50]  Out: 400 [Urine:400]  No intake/output data recorded.  Recent Results (from the past 336 hour(s))   CBC auto differential    Collection Time: 12/30/21  8:16 AM   Result Value Ref Range    WBC 17.84 (H) 3.90 - 12.70 K/uL    Hemoglobin 7.5 (L) 12.0 - 16.0 g/dL    Hematocrit 24.9 (L) 37.0 - 48.5 %    Platelets 593 (H) 150 - 450 K/uL   CBC auto differential    Collection Time: 12/29/21  7:28 AM   Result Value Ref Range    WBC 14.82 (H) 3.90 - 12.70 K/uL    Hemoglobin 7.9 (L) 12.0 - 16.0 g/dL    Hematocrit 26.1 (L) 37.0 - 48.5 %    Platelets 603 (H) 150 - 450 K/uL   CBC Auto Differential    Collection Time: 12/26/21  8:44 AM   Result Value Ref Range    WBC 14.79 (H) 3.90 - 12.70 K/uL    Hemoglobin 8.2 (L) 12.0 - 16.0 g/dL    Hematocrit 25.7 (L) 37.0 - 48.5 %    Platelets 632 (H) 150 - 450 K/uL   c/o incisional pain , dressing chnaged , drain removed , skin flap looks good , continue dily dressing chnages with xeroform , 4 x 4 , kerlix and ace bandage . Continue prophylactic antibiotics iv.  "

## 2021-12-30 NOTE — PT/OT/SLP PROGRESS
Physical Therapy Treatment    Patient Name:  Suyapa Connelly   MRN:  [id number]    Recommendations:     Discharge Recommendations:  rehabilitation facility   Discharge Equipment Recommendations:  (TBD)   Barriers to discharge: impaired functional mobility, increased assist needed    Assessment:     Suyapa Connelly is a 56 y.o. female admitted with a medical diagnosis of Gangrene.  She presents with the following impairments/functional limitations:  weakness,impaired self care skills,impaired balance,impaired endurance,impaired functional mobilty,decreased safety awareness,decreased lower extremity function,pain,edema,impaired skin,decreased ROM,decreased coordination .Pt reports 10/10 post-op pain R BKA at rest during AM session and 6/10 at rest when seen in PM. Pt reporting 8/10 at end of PM session. Pt with fair activity tolerance. Pt requires MaxA x 2 for bed <> WC tf. Pt tolerated sitting up in WC for 1.5 hrs. Pt refuses to use slideboard for tf despite max encouragement and education on possible benefits and increased safety with use. IPR upon d/c.     Rehab Prognosis: Good; patient would benefit from acute skilled PT services to address these deficits and reach maximum level of function.    Recent Surgery: Procedure(s) (LRB):  AMPUTATION, BELOW KNEE (Right) 2 Days Post-Op    Plan:     During this hospitalization, patient to be seen 5 x/week to address the identified rehab impairments via gait training,therapeutic activities,therapeutic exercises,neuromuscular re-education,wheelchair management/training and progress toward the following goals:    · Plan of Care Expires:  01/29/22    Subjective     Chief Complaint: Pain at incision site  Patient/Family Comments/goals: Pt requesting more options for meals. Nsg notified.  Pain/Comfort:  · Location - Side 1: Right  · Location 1:  (BKA incision site)  · Pain Addressed 1: Reposition,Distraction,Cessation of Activity,Nurse notified,Pre-medicate for activity  · Pain  Rating Post-Intervention 1: 8/10      Objective:     Patient found HOB elevated with bed alarm,peripheral IV,PureWick upon PT entry to room.     General Precautions: Standard, fall   Orthopedic Precautions:N/A   Braces: N/A  Respiratory Status: Room air     Functional Mobility:  · Bed Mobility:     · Rolling Left:  moderate assistance for RLE (BKA)  · Scooting: moderate assistance and maximal assistance, mostly for RLE  · Supine to Sit: moderate assistance, Hob elevated, use of bed rail, assist for RLE  · Sit to Supine: maximal assistance for RLE  · Transfers:     · Bed to Chair: maximal assistance and of 2 persons with  PT assisting positioned in front pt and OT on side/behind  using  Scoot Pivot, pt refusing to use slideboard      AM-PAC 6 CLICK MOBILITY  Turning over in bed (including adjusting bedclothes, sheets and blankets)?: 2  Sitting down on and standing up from a chair with arms (e.g., wheelchair, bedside commode, etc.): 1  Moving from lying on back to sitting on the side of the bed?: 2  Moving to and from a bed to a chair (including a wheelchair)?: 2  Need to walk in hospital room?: 1  Climbing 3-5 steps with a railing?: 1  Basic Mobility Total Score: 9       Therapeutic Activities and Exercises:   Pt agreeable to participate in therapy session.  Pt transitioned to sit up in WC when seen in AM. Pt reporting desire to stay up in chair. Pt transitioned back to bed in PM.  Pt reports 10/10 post-op pain R BKA at rest during AM session and 6/10 at rest when seen in PM. Pt reporting 8/10 at end of PM session. Pt with fair activity tolerance. Pt requires MaxA x 2 for bed <> WC tf. Pt tolerated sitting up in WC for 1.5 hrs. Pt refuses to use slideboard for tf despite max encouragement and education on possible benefits and increased safety with use.  R knee positioned in extension on pillow for elevation and to prevent flexion contracture.     Patient left HOB elevated with all lines intact, call button in reach,  bed alarm on and nsg notified..    GOALS:   Multidisciplinary Problems     Physical Therapy Goals        Problem: Physical Therapy Goal    Goal Priority Disciplines Outcome Goal Variances Interventions   Physical Therapy Goal     PT, PT/OT Ongoing, Progressing     Description: Goals to be met by: 22     Patient will increase functional independence with mobility by performin. Supine to sit with Minimal Assistance  2. Sit to supine with Minimal Assistance  3. Rolling to Left and Right with Stand-by Assistance.  4. Bed to WC transfer with Minimal Assistance using Slideboard.                     Time Tracking:     PT Received On: 22  PT Start Time: 1102   PM: 1303  PT Stop Time: 1128 PM: 1318  PT Total Time (min): 26 min + 15 (cotx with OT) = 41 min total    Billable Minutes: Therapeutic Activity 25     Treatment Type: Treatment  PT/PTA: PT     PTA Visit Number: 0     2021

## 2021-12-30 NOTE — PT/OT/SLP EVAL
Occupational Therapy   Evaluation    Name: Suyapa Connelly  MRN: [id number]  Admitting Diagnosis:  Gangrene  Recent Surgery: Procedure(s) (LRB):  AMPUTATION, BELOW KNEE (Right) 1 Day Post-Op    Recommendations:     Discharge Recommendations: other (see comments) (TBD)  Discharge Equipment Recommendations:  other (see comments) (TBD possibly lift device)  Barriers to discharge:  Other (Comment) (Pt requires increased assistance at this time)    Assessment:     Suyapa Connelly is a 56 y.o. female with a medical diagnosis of Gangrene.  She presents with The primary encounter diagnosis was Gangrene. Diagnoses of Pain, Cold foot, Cellulitis, unspecified cellulitis site, Osteomyelitis of right foot, unspecified type, Chest pain, Uremia, Osteomyelitis of right foot, Gangrene of extremity, Diabetic ulcer of right foot associated with type 2 diabetes mellitus, with necrosis of muscle, unspecified part of foot, and Diabetic ulcer of right midfoot associated with type 2 diabetes mellitus, with muscle involvement without evidence of necrosis were also pertinent to this visit. Performance deficits affecting function: weakness,impaired self care skills,decreased coordination,decreased safety awareness,decreased ROM,impaired skin,edema,pain,impaired endurance,gait instability,decreased lower extremity function,impaired functional mobilty,impaired balance.      Pt would benefit from cont OT services in order to maximize functional independence. Recommending TBD pending progress/ongoing assessment. Pt declining EOB/OOB ax this date 2/2 pain. Pt with c/o 10/10 R leg pain. Pt required ModA to roll B for hygiene. Pt required MaxA x2 to scoot to HOB with use of draw sheet & pt using bed rails.     Rehab Prognosis: Good; patient would benefit from acute skilled OT services to address these deficits and reach maximum level of function.       Plan:     Patient to be seen 3 x/week to address the above listed problems via self-care/home  management,therapeutic activities,therapeutic exercises  · Plan of Care Expires: 01/29/22  · Plan of Care Reviewed with: patient    Subjective     Chief Complaint: Pain in R BKA incision site  Patient/Family Comments/goals: Return home    Occupational Profile:  Pt lives with her  in a SSH, ramp to enter, and T/S.   Prior to admission, patients level of function was needing assist for all ADL's from , takes sponge baths, and her  assists her with transfers bed <> WC.    Equipment used at home: wheelchair,hospital bed.    DME owned (not currently used): none.    Upon discharge, patient will have assistance from .    Pain/Comfort:  · Pain Rating 1: 10/10  · Location - Side 1: Right  · Location 1:  (BKA incision site)  · Pain Addressed 1: Distraction,Reposition,Cessation of Activity,Nurse notified  · Pain Rating Post-Intervention 1: 10/10    Patients cultural, spiritual, Orthodoxy conflicts given the current situation: no    Objective:     Communicated with: nsg prior to session.  Patient found HOB elevated with bed alarm,peripheral IV upon OT entry to room.    General Precautions: Standard, fall   Orthopedic Precautions:N/A   Braces: N/A  Respiratory Status: Room air    Occupational Performance:    Bed Mobility:    · Patient completed Rolling/Turning to Left with  moderate assistance  · Patient completed Rolling/Turning to Right with moderate assistance  · Patient completed Scooting/Bridging with maximal assistance and 2 persons with use of draw sheet & pt using B hand rails    Functional Mobility/Transfers:  · NT pt declining     Activities of Daily Living:  · NT    Cognitive/Visual Perceptual:  Cognitive/Psychosocial Skills:     -       Oriented to: Person, Place, Time and Situation   -       Follows Commands/attention:Follows multistep  commands  -       Memory: No Deficits noted  -       Safety awareness/insight to disability: impaired   -       Mood/Affect/Coping skills/emotional  control: Cooperative    Physical Exam:  Sensation:    -       Intact BUE  Upper Extremity Range of Motion:     -       Right Upper Extremity: WFL  -       Left Upper Extremity: WFL  Upper Extremity Strength:    -       Right Upper Extremity: WFL  -       Left Upper Extremity: WFL   Strength:    -       Right Upper Extremity: WFL  -       Left Upper Extremity: WFL    AMPAC 6 Click ADL:  AMPA Total Score: 17    Treatment & Education:  Pt declining EOB/OOB ax this date 2/2 pain.   Pt with c/o 10/10 R leg pain.   Pt required ModA to roll B for hygiene.   Pt required MaxA x2 to scoot to HOB with use of draw sheet & pt using bed rails.   Pt educated on positioning for RLE.   Education:    Patient left HOB elevated with all lines intact, call button in reach, bed alarm on, nsg notified and PCT present    GOALS:   Multidisciplinary Problems     Occupational Therapy Goals        Problem: Occupational Therapy Goal    Goal Priority Disciplines Outcome Interventions   Occupational Therapy Goal     OT, PT/OT Ongoing, Progressing    Description: Goals to be met by: 1/29/2022     Patient will increase functional independence with ADLs by performing:    UE Dressing with Modified [place].  Grooming while seated with Modified [place].  Toileting from bedside commode with Modified [place] for hygiene and clothing management.   Rolling to Bilateral with Modified [place].   Supine to sit with Modified [place].  Toilet transfer to bedside commode with Minimal Assistance.                     History:     Past Medical History:   Diagnosis Date    Anxiety     Chronic pain syndrome     CKD (chronic kidney disease), stage III     Depression     Diabetes mellitus     type 2    Diabetes mellitus, type 2     GERD (gastroesophageal reflux disease)     Hyperemesis 3/23/2021    Hyperlipemia     Hypertension     Hypokalemia 3/23/2021    Myocardial infarction 2010    minor-caused by stress per pt.     Osteomyelitis     Osteomyelitis of left foot 4/30/2021    PVD (peripheral vascular disease)     Vaginal delivery     x1       Past Surgical History:   Procedure Laterality Date    ABOVE-KNEE AMPUTATION Left 5/18/2021    Procedure: AMPUTATION, ABOVE KNEE;  Surgeon: Teddy Huber MD;  Location: [address];  Service: Vascular;  Laterality: Left;    Angiogram - Right Extremity Right 7/9/15    angiogram-left leg  10/6/15    ANGIOGRAPHY OF LOWER EXTREMITY Left 4/29/2021    Procedure: ANGIOGRAM, LOWER EXTREMITY;  Surgeon: Teddy Huber MD;  Location: [address];  Service: Vascular;  Laterality: Left;    BELOW KNEE AMPUTATION OF LOWER EXTREMITY Right 12/28/2021    Procedure: AMPUTATION, BELOW KNEE;  Surgeon: Kaitlyn Rojas MD;  Location: [facility];  Service: General;  Laterality: Right;    CATHETERIZATION OF BOTH LEFT AND RIGHT HEART N/A 12/18/2019    Procedure: CATHETERIZATION, HEART, BOTH LEFT AND RIGHT;  Surgeon: Que Fernando III, MD;  Location: [facility] CATH LAB;  Service: Cardiology;  Laterality: N/A;    CORONARY ANGIOGRAPHY N/A 12/18/2019    Procedure: ANGIOGRAM, CORONARY ARTERY;  Surgeon: Que Fernando III, MD;  Location: [facility] CATH LAB;  Service: Cardiology;  Laterality: N/A;    CORONARY ANGIOGRAPHY INCLUDING BYPASS GRAFTS WITH CATHETERIZATION OF LEFT HEART N/A 7/28/2020    Procedure: ANGIOGRAM, CORONARY, INCLUDING BYPASS GRAFT, WITH LEFT HEART CATHETERIZATION, 9 am;  Surgeon: Rachel Easley MD;  Location: [facility] CATH LAB;  Service: Cardiology;  Laterality: N/A;    CORONARY ARTERY BYPASS GRAFT (CABG) N/A 1/14/2020    Procedure: CORONARY ARTERY BYPASS GRAFT (CABG) x 1     Off Pump;  Surgeon: Huang Altamirano MD;  Location: [address];  Service: Cardiovascular;  Laterality: N/A;    CREATION OF FEMORAL-TIBIAL ARTERY BYPASS Left 4/29/2021    Procedure: CREATION, BYPASS, ARTERIAL, FEMORAL TO ANTERIOR TIBIAL;  Surgeon: Teddy Huber MD;  Location: [address]  [facility];  Service: Vascular;  Laterality: Left;    CREATION OF FEMOROPOPLITEAL ARTERIAL BYPASS USING GRAFT Left 8/18/2020    Procedure: CREATION, BYPASS, ARTERIAL, FEMORAL TO POPLITEAL, USING GRAFT, LEFT LOWER EXTREMITY;  Surgeon: Teddy Huber MD;  Location: [facility] OR;  Service: Vascular;  Laterality: Left;  REQUEST 7:15 A.M. START----COVID NEGATIVE ON 8/17  1ST CASE STARTE PER LEANA ON 8/7/2020 @ 942AM-  RN PREOP 8/12/2020   T/S-----CLEARED BY CARDS-------PENDING INSURANCE    DEBRIDEMENT OF FOOT Left 9/8/2020    Procedure: DEBRIDEMENT, FOOT;  Surgeon: Rosio Mayes DPM;  Location: [facility] OR;  Service: Podiatry;  Laterality: Left;  request neoxx .   RN Pre Op 9-4-2020, Covid negative on 9/5/20. C A    DEBRIDEMENT OF FOOT  3/4/2021    Procedure: DEBRIDEMENT, FOOT;  Surgeon: Teddy Huber MD;  Location: [facility] OR;  Service: Vascular;;    DEBRIDEMENT OF FOOT Left 3/9/2021    Procedure: DEBRIDEMENT, FOOT, bone biopsy;  Surgeon: Rosio Mayes DPM;  Location: [facility] OR;  Service: Podiatry;  Laterality: Left;  Request neoxx---COVID IN AM  REQUESTING NOON START  RN Phone Pre op.On Blood thinners Plavix and Eliquis.  Covid am of surgery. C A    DEBRIDEMENT OF FOOT Left 5/4/2021    Procedure: DEBRIDEMENT, FOOT;  Surgeon: Farooq Morley DPM;  Location: [facility] OR 2ND FLR;  Service: Podiatry;  Laterality: Left;    INSERTION OF TUNNELED CENTRAL VENOUS HEMODIALYSIS CATHETER N/A 1/27/2020    Procedure: Insertion, Catheter, Central Venous, Hemodialysis;  Surgeon: ESTEBAN Gomez III, MD;  Location: [facility] CATH LAB;  Service: Peripheral Vascular;  Laterality: N/A;    PERCUTANEOUS TRANSLUMINAL ANGIOPLASTY N/A 3/4/2021    Procedure: PTA (ANGIOPLASTY, PERCUTANEOUS, TRANSLUMINAL);  Surgeon: Teddy Huber MD;  Location: [facility] OR;  Service: Vascular;  Laterality: N/A;    REMOVAL OF ARTERIOVENOUS GRAFT Left 5/27/2021    Procedure: REMOVAL, GRAFT, LEFT LOWER EXTREMITY, WOUND EXPLORATION;  Surgeon: Teddy BLANCA  MD Cecile;  Location: [facility] OR 2ND FLR;  Service: Vascular;  Laterality: Left;    REMOVAL OF NAIL OF DIGIT Left 3/9/2021    Procedure: REMOVAL, NAIL, DIGIT;  Surgeon: Rosio Mayes DPM;  Location: [facility] OR;  Service: Podiatry;  Laterality: Left;    THROMBECTOMY Left 3/4/2021    Procedure: THROMBECTOMY, LEFT LOWER EXTREMITY BYPASS GRAFT, ANGIOGRAM, POSSIBLE INTERVENTION, POSSIBLE LEFT LOWER EXTREMITY BYPASS;  Surgeon: Teddy Huber MD;  Location: [facility] OR;  Service: Vascular;  Laterality: Left;    THROMBECTOMY Left 4/29/2021    Procedure: GRAFT THROMBECTOMY, LEFT LOWER EXTREMITY;  Surgeon: Teddy Huber MD;  Location: [facility] OR 2ND FLR;  Service: Vascular;  Laterality: Left;  14.5 min  1179.85 mGy  341.01 Gycm2  240 ml dye    THROMBECTOMY  10/22/2021    Procedure: THROMBECTOMY;  Surgeon: Saad Arenas MD;  Location: [facility] CATH LAB/EP;  Service: Cardiology;;       Time Tracking:     OT Date of Treatment: 12/29/21  OT Start Time: 0954  OT Stop Time: 1017  OT Total Time (min): 23 min    Billable Minutes:Evaluation 10 co eval with PT    12/29/2021

## 2021-12-30 NOTE — PROGRESS NOTES
Pharmacokinetic Assessment Follow Up: IV Vancomycin    Vancomycin serum concentration assessment(s):    The random level was drawn correctly and can be used to guide therapy at this time. The measurement is above the desired definitive target range of 15 to 20 mcg/mL.    Vancomycin Regimen Plan:    Re-dose when the random level is less than 20 mcg/mL, next level to be drawn at 0400 on 12/31    Drug levels (last 3 results):  Recent Labs   Lab Result Units 12/28/21  0852 12/30/21  0816   Vancomycin, Random ug/mL 19.8  --    Vancomycin-Trough ug/mL  --  22.1*       Pharmacy will continue to follow and monitor vancomycin.    Please contact pharmacy at extension [phone] for questions regarding this assessment.    Thank you for the consult,   Teofilo Mora       Patient brief summary:  Suyapa Connelly is a 56 y.o. female initiated on antimicrobial therapy with IV Vancomycin for treatment of bone/joint infection    The patient's current regimen is vancomycin dose by level    Drug Allergies:   Review of patient's allergies indicates:   Allergen Reactions    Ciprofloxacin Itching    Contrast media      Kidney injury    Iodine      Kidney injury       Actual Body Weight:   79.2kg    Renal Function:   Estimated Creatinine Clearance: 47.2 mL/min (based on SCr of 1.4 mg/dL).,     Dialysis Method (if applicable):      CBC (last 72 hours):  Recent Labs   Lab Result Units 12/29/21  0728 12/30/21  0816   WBC K/uL 14.82* 17.84*   Hemoglobin g/dL 7.9* 7.5*   Hematocrit % 26.1* 24.9*   Platelets K/uL 603* 593*   Gran % % 76.6* 80.0*   Lymph % % 11.0* 8.6*   Mono % % 9.9 10.0   Eosinophil % % 1.1 0.5   Basophil % % 0.7 0.3   Differential Method  Automated Automated       Metabolic Panel (last 72 hours):  Recent Labs   Lab Result Units 12/29/21  0728 12/30/21  0816   Sodium mmol/L 138 136   Potassium mmol/L 3.7 3.1*   Chloride mmol/L 107 105   CO2 mmol/L 18* 19*   Glucose mg/dL 79 82   BUN mg/dL 21* 22*   Creatinine mg/dL 1.4 1.4    Magnesium mg/dL 1.9  --        Vancomycin Administrations:  vancomycin given in the last 96 hours                     vancomycin 500 mg in dextrose 5 % 100 mL IVPB (ready to mix system) (mg) 500 mg New Bag 12/29/21 1123     500 mg New Bag 12/28/21 1120    vancomycin injection (g) 1 g Given 12/28/21 1927    vancomycin 500 mg in dextrose 5 % 100 mL IVPB (ready to mix system) (mg) 500 mg New Bag 12/27/21 1023                    Microbiologic Results:  Microbiology Results (last 7 days)       ** No results found for the last 168 hours. **

## 2021-12-30 NOTE — ASSESSMENT & PLAN NOTE
- Had intervention to RLE in October  - Persistent non-healing diabetic right foot wounds   - Arterial US showing stenosis   - Cardiology following  - BKA on 12/28/21

## 2021-12-30 NOTE — PLAN OF CARE
TN left message for Vicki at [facility] to see if patient can admit tomorrow pending medical stability; Awaiting call back

## 2021-12-30 NOTE — PLAN OF CARE
Pt clinically accepted to [facility] pending medically stability.       12/30/21 3271   Discharge Reassessment   Assessment Type Discharge Planning Reassessment   Did the patient's condition or plan change since previous assessment? Yes  (pt post BKA on 12/28)   Communicated DEVAN with patient/caregiver Yes   Discharge Plan A Rehab   DME Needed Upon Discharge  none   Discharge Barriers Identified None   Why the patient remains in the hospital Requires continued medical care   Post-Acute Status   Post-Acute Authorization Placement   Post-Acute Placement Status Pending medical clearance/testing

## 2021-12-30 NOTE — SUBJECTIVE & OBJECTIVE
Interval History: Seen virtually. Had right BKA yesterday evening. No acute events overnight.   Review of Systems   Constitutional: Negative for chills and fever.   Respiratory: Negative for cough and shortness of breath.    Cardiovascular: Negative for chest pain and leg swelling.   Gastrointestinal: Negative for abdominal pain and nausea.   Genitourinary: Negative for difficulty urinating.   Musculoskeletal: Positive for arthralgias and myalgias.        Right leg pain   Skin: Positive for wound.   Neurological: Positive for weakness.   Psychiatric/Behavioral:        Depressed     Objective:     Vital Signs (Most Recent):  Temp: 98.4 °F (36.9 °C) (12/30/21 0029)  Pulse: 99 (12/30/21 0029)  Resp: 18 (12/30/21 0115)  BP: (!) 107/59 (12/30/21 0029)  SpO2: 100 % (12/30/21 0029) Vital Signs (24h Range):  Temp:  [98.4 °F (36.9 °C)-100.5 °F (38.1 °C)] 98.4 °F (36.9 °C)  Pulse:  [] 99  Resp:  [16-18] 18  SpO2:  [96 %-100 %] 100 %  BP: (104-136)/(59-84) 107/59     Weight: 79.2 kg (174 lb 9.7 oz)  Body mass index is 29.06 kg/m².  No intake or output data in the 24 hours ending 12/30/21 0214   Physical Exam  Vitals and nursing note reviewed.   Constitutional:       General: She is not in acute distress.     Appearance: Normal appearance.   Cardiovascular:      Rate and Rhythm: Normal rate.   Pulmonary:      Effort: Pulmonary effort is normal. No respiratory distress.   Abdominal:      General: There is no distension.   Musculoskeletal:         General: No swelling.      Cervical back: Normal range of motion.      Comments: Bilateral leg amputations   Skin:     General: Skin is dry.      Comments: Right leg BKA   Neurological:      Mental Status: She is alert and oriented to person, place, and time.      Motor: Weakness present.   Psychiatric:         Behavior: Behavior normal.         Thought Content: Thought content normal.      Comments: Depressed about losing leg         Significant Labs:   All pertinent labs within  the past 24 hours have been reviewed.  Blood Culture: No results for input(s): LABBLOO in the last 48 hours.  BMP:   Recent Labs   Lab 12/29/21  0728   GLU 79      K 3.7      CO2 18*   BUN 21*   CREATININE 1.4   CALCIUM 7.9*   MG 1.9     CBC:   Recent Labs   Lab 12/29/21 0728   WBC 14.82*   HGB 7.9*   HCT 26.1*   *     CMP:   Recent Labs   Lab 12/29/21  0728      K 3.7      CO2 18*   GLU 79   BUN 21*   CREATININE 1.4   CALCIUM 7.9*   ANIONGAP 13   EGFRNONAA 42*     Lactic Acid:   No results for input(s): LACTATE in the last 48 hours.    Significant Imaging: I have reviewed all pertinent imaging results/findings within the past 24 hours.

## 2021-12-30 NOTE — CONSULTS
PSYCHIATRY INPATIENT CONSULT NOTE      12/30/2021 8:00 AM   Suyapa Connelly   1965   [id number]           DATE OF ADMISSION: 12/21/2021  3:18 PM    SITE: [facility]    CURRENT LEGAL STATUS: Patient does not currently meet PEC/CEC criteria due to not currently being an imminent threat to self/others and not being gravely disabled 2/2 mental illness at this time.     HISTORY    Per Initial History from Primary Team:   Suyapa Connelly is a 55 yo female with a pmh of DM2, PVD, osteomyelitis, right AKA, CKD3, CAD s/p CABG, afib on eliquis. She presented with right foot pain x 1 month. She has an ulcer to her right heel. Presented with dressing to right heel which was removed and had purulent foul smelling drainage noted. The dressing was not changed in 2 months since she last saw Dr. Askew on 10/22/21 for arthrectomy, thrombectomy, and PTA with DCB to right lower extremity. Had not gone to follow up appts. She now has what appears to be dry gangrene to right toes with bone exposure. She has not been compliant with medications and states she had not taken eliquis in over a month. She states she has not been able to stand due to right leg weakness since her left AKA in May 2021. She also has a pressure wound to the lateral right lower leg. She takes her 's percocet for pain, which was helping some.   Per Interval History from Primary Team on 12/29/2021:  Seen virtually. Had right BKA yesterday evening. No acute events overnight.       Chief Complaint / Reason for Psychiatry Consult: Depression       HPI   Suyapa Connelly is a 56 y.o. female with a past medical history as noted above/below and a past psychiatric history of MDD, CAROLIN, and Insomnia, currently being treated by her inpatient primary team for a principle problem of gangrene (now s/p R BKA on 12/28).  Psychiatry was originally consulted as noted above.  The patient was seen and examined.  The chart was reviewed.  On examination today, the patient  "was alert and oriented to person, place, city, state, month, year, and situation.  She was CAM-ICU negative for delirium.  She was irritable throughout the assessment attempt, and she perseverated on "somebody has got to be held accountable for me losing my legs."  The patient voiced being "mad" about needing bilateral amputations this year, and she kept circling back to it being the medical community's fault and how she thinks that the medical community needs to be help accountable for her amputations.  I frequently had to redirect the patient back to my clinical role as a psychiatrist and my area of expertise.  She endorses a hx of depression and anxiety, and she continues to endorse current/recent s/s of depression and anxiety as noted below in the detailed psych ROS.  She endorses poor appetite (empty Popeyes box and Coca Cola at the bedside) and endorses poor sleep at night with some sleep/wake cycle dysregulation.  She denies any current or prior s/s consistent with sheryl, psychosis, OCD, PTSD, eating disorders, or substance abuse (other than frequent cannabis use).  She did not appear manic or psychotic on exam.  She denies any current/prior passive/active SI/HI.  She denies any current or prior AH, VH, TH, delusions, paranoia, or DIGFAST s/s.  She denies any adverse effects to her current medication regimen.  She endorses being on Lexapro and Remeron for the past 6 months with little benefit.  Regarding current medical/physical complaints, she endorses fatigue, myalgias, and mild RLE wound (post-surgical) pain.  She denies any other medical complaints at this time.  NAD was observed during the examination.  Psychotherapy was implemented with a focus on depression, anxiety, insomnia, and adjustment.  After discussing the risks, benefits, alt vs no treatment, she is agreeable and desiring to change her Lexapro to Zoloft as well as increase her Remeron dose (see A/P below).        Psychiatric Review Of Systems " - Currently, the patient is endorsing and/or denying the following:  (patient's endorsements are BOLDED below; if not BOLDED, then patient denied):    Endorses Symptoms of Depression/MDD: diminished mood, low motivation, loss of interest/anhedonia, irritability, anger, diminished energy, change in sleep, change in appetite, diminished concentration or cognition or indecisiveness, PMA/R, excessive guilt or hopelessness or worthlessness, suicidal ideations    Endorses trouble with Sleep: initiation, maintenance, early morning awakening with inability to return to sleep    Denies Suicidal/Homicidal ideations: active/passive ideations, organized plans, future intentions    Denies Symptoms of psychosis: hallucinations, delusions, disorganized thinking, disorganized behavior or abnormal motor behavior, or negative symptoms (diminshed emotional expression, avolition, anhedonia, alogia, asociality     Denies Symptoms of sheryl or hypomania: elevated, expansive, or irritable mood with increased energy or activity; with inflated self-esteem or grandiosity, decreased need for sleep, increased rate of speech, FOI or racing thoughts, distractibility, increased goal directed activity or PMA, risky/disinhibited behavior    Endorses Symptoms of Anxiety/CAROLIN: excessive anxiety/worry/fear, more days than not, about numerous issues, difficult to control, with restlessness, fatigue, poor concentration, irritability, muscle tension, sleep disturbance; causes functionally impairing distress     Denies Symptoms of Panic Disorder: recurrent panic attacks, precipitated or un-precipitated, source of worry and/or behavioral changes secondary; with or without agoraphobia    Denies Symptoms of PTSD: h/o trauma; re-experiencing/intrusive symptoms, avoidant behavior, negative alterations in cognition or mood, or hyperarousal symptoms; with or without dissociative symptoms     Denies Symptoms of OCD: obsessions or compulsions     Denies Symptoms of  Eating Disorders: anorexia, bulimia or binging    Endorses Substance Use (cannabis): intoxication, withdrawal, tolerance, used in larger amounts or duration than intended, unsuccessful attempts to limit or quit, increased time engaging in or seeking out, cravings or strong desire to use, failure to fulfill obligations, negative consequences in social/interpersonal/occupational,/recreational areas, use in dangerous situations, medical or psychological consequences       PSYCHOTHERAPY ADD-ON [id number]   30 (16-37*) minutes    Time: 18 minutes  Participants: Met with patient    Therapeutic Intervention Type: behavior modifying psychotherapy, supportive psychotherapy  Why chosen therapy is appropriate versus another modality: relevant to diagnosis, patient responds to this modality, evidence based practice    Target symptoms: depression, anxiety, insomnia, and adjustment  Primary focus: depression, anxiety, insomnia, and adjustment  Psychotherapeutic techniques: supportive and psychodynamic techniques; psycho-education; deep breathing exercises; CBT; problem solving techniques and managing life/medical stressors    Outcome monitoring methods: self-report, observation    Patient's response to intervention:  The patient's response to intervention is guarded, reluctant.    Progress toward goals:  The patient's progress toward goals is limited.      ROS  General ROS: negative for - chills, fever or night sweats; positive for fatigue  Ophthalmic ROS: negative for - blurry vision, double vision or eye pain  ENT ROS: negative for - sinus pain, headaches, sore throat or visual changes  Allergy and Immunology ROS: negative for - hives, itchy/watery eyes or nasal congestion  Hematological and Lymphatic ROS: negative for - bleeding problems, bruising, jaundice or pallor  Endocrine ROS: negative for - galactorrhea, hot flashes, mood swings, palpitations or temperature intolerance  Respiratory ROS: negative for - cough, hemoptysis,  shortness of breath, tachypnea or wheezing  Cardiovascular ROS: negative for - chest pain, dyspnea on exertion, loss of consciousness, palpitations, rapid heart rate or shortness of breath  Gastrointestinal ROS: negative for - appetite loss, nausea, abdominal pain, blood in stools, change in bowel habits, constipation or diarrhea  Genito-Urinary ROS: negative for - incontinence, nocturia or pelvic pain  Musculoskeletal ROS: negative for - joint stiffness, joint swelling; positive for myalgias and mild post-BKA pain   Neurological ROS: negative for - behavioral changes, confusion, dizziness, memory loss, numbness/tingling or seizures  Dermatological ROS: negative for dry skin, hair changes, pruritus or rash; positive for wound   Psychiatric ROS: see detailed psychiatric ROS above in history section       Past Psychiatric History:  Previous Medication Trials: yes, Lexapro, Remeron, Neurontin, and Cymbalta   Previous Psychiatric Hospitalizations: denies  Previous Suicide Attempts: denies  History of Violence: denies  Current/Recent Outpatient Psychiatrist: denies  Hx of Depression: yes  Hx of Anxiety: yes  Hx of Anel: denies  Hx of Psychosis: denies   Hx of PTSD: denies     Social History:  Marital Status:   Children: 1   Employment Status/Info: currently unemployed  Education: some college  Special Ed: denies  : denies  Housing Status: lives with  and mother-in-law in [place], [place]  Hobbies/Leisure time: time with family   History of phys/sexual abuse: denies  Access to gun: denies    Family Psychiatric History: denies    Substance Abuse History:  Recreational Drugs: frequent cannabis; otherwise denies   Use of Alcohol: denies  Rehab History: denies  Tobacco Use: denies  Use of Caffeine: frequent Coca Cola   Legal consequences of chemical use: denies    Legal History:  Past Charges/Incarcerations: denies   Pending charges: denies     Psychosocial Stressors: health and mother-in-law with dementia    Functioning Relationships: good support system in    Strengths AND Liabilities  Strength: Patient is expressive/articulate., Strength: Patient has positive support network., Liability: Patient has poor health., Liability: Patient lacks coping skills.      PAST MEDICAL & SURGICAL HISTORY   Past Medical History:   Diagnosis Date    Anxiety     Chronic pain syndrome     CKD (chronic kidney disease), stage III     Depression     Diabetes mellitus     type 2    Diabetes mellitus, type 2     GERD (gastroesophageal reflux disease)     Hyperemesis 3/23/2021    Hyperlipemia     Hypertension     Hypokalemia 3/23/2021    Myocardial infarction 2010    minor-caused by stress per pt.    Osteomyelitis     Osteomyelitis of left foot 4/30/2021    PVD (peripheral vascular disease)     Vaginal delivery     x1     Past Surgical History:   Procedure Laterality Date    ABOVE-KNEE AMPUTATION Left 5/18/2021    Procedure: AMPUTATION, ABOVE KNEE;  Surgeon: Teddy Huber MD;  Location: [facility] OR [address] [place] [address];  Service: Vascular;  Laterality: Left;    Angiogram - Right Extremity Right 7/9/15    angiogram-left leg  10/6/15    ANGIOGRAPHY OF LOWER EXTREMITY Left 4/29/2021    Procedure: ANGIOGRAM, LOWER EXTREMITY;  Surgeon: Teddy Huber MD;  Location: [facility] OR [address] [place] [address];  Service: Vascular;  Laterality: Left;    BELOW KNEE AMPUTATION OF LOWER EXTREMITY Right 12/28/2021    Procedure: AMPUTATION, BELOW KNEE;  Surgeon: Kaitlyn Rojas MD;  Location: [facility];  Service: General;  Laterality: Right;    CATHETERIZATION OF BOTH LEFT AND RIGHT HEART N/A 12/18/2019    Procedure: CATHETERIZATION, HEART, BOTH LEFT AND RIGHT;  Surgeon: Que Fernando III, MD;  Location: [facility] CATH LAB;  Service: Cardiology;  Laterality: N/A;    CORONARY ANGIOGRAPHY N/A 12/18/2019    Procedure: ANGIOGRAM, CORONARY ARTERY;  Surgeon: Que Fernando III, MD;  Location: [facility] CATH LAB;  Service: Cardiology;  Laterality: N/A;     CORONARY ANGIOGRAPHY INCLUDING BYPASS GRAFTS WITH CATHETERIZATION OF LEFT HEART N/A 7/28/2020    Procedure: ANGIOGRAM, CORONARY, INCLUDING BYPASS GRAFT, WITH LEFT HEART CATHETERIZATION, 9 am;  Surgeon: Rachel Easley MD;  Location: [facility] CATH LAB;  Service: Cardiology;  Laterality: N/A;    CORONARY ARTERY BYPASS GRAFT (CABG) N/A 1/14/2020    Procedure: CORONARY ARTERY BYPASS GRAFT (CABG) x 1     Off Pump;  Surgeon: Huang Altamirano MD;  Location: [facility] OR [address] [place] [address];  Service: Cardiovascular;  Laterality: N/A;    CREATION OF FEMORAL-TIBIAL ARTERY BYPASS Left 4/29/2021    Procedure: CREATION, BYPASS, ARTERIAL, FEMORAL TO ANTERIOR TIBIAL;  Surgeon: Teddy Huber MD;  Location: [facility] OR [address] [place] [address];  Service: Vascular;  Laterality: Left;    CREATION OF FEMOROPOPLITEAL ARTERIAL BYPASS USING GRAFT Left 8/18/2020    Procedure: CREATION, BYPASS, ARTERIAL, FEMORAL TO POPLITEAL, USING GRAFT, LEFT LOWER EXTREMITY;  Surgeon: Teddy Huber MD;  Location: [facility] OR;  Service: Vascular;  Laterality: Left;  REQUEST 7:15 A.M. START----COVID NEGATIVE ON 8/17  1ST CASE STARTKENYA DUEÑAS ON 8/7/2020 @ 942AM-LO  RN PREOP 8/12/2020   T/S-----CLEARED BY CARDS-------PENDING INSURANCE    DEBRIDEMENT OF FOOT Left 9/8/2020    Procedure: DEBRIDEMENT, FOOT;  Surgeon: Rosio Mayes DPM;  Location: [facility] OR;  Service: Podiatry;  Laterality: Left;  request neoxx .   RN Pre Op 9-4-2020, Covid negative on 9/5/20. C A    DEBRIDEMENT OF FOOT  3/4/2021    Procedure: DEBRIDEMENT, FOOT;  Surgeon: Teddy Huber MD;  Location: [facility] OR;  Service: Vascular;;    DEBRIDEMENT OF FOOT Left 3/9/2021    Procedure: DEBRIDEMENT, FOOT, bone biopsy;  Surgeon: Rosio Mayes DPM;  Location: [facility] OR;  Service: Podiatry;  Laterality: Left;  Request neoxx---COVID IN AM  REQUESTING NOON START  RN Phone Pre op.On Blood thinners Plavix and Eliquis.  Covid am of surgery. C A    DEBRIDEMENT OF FOOT Left 5/4/2021    Procedure: DEBRIDEMENT, FOOT;   Surgeon: Farooq Morley DPM;  [facility];  Service: Podiatry;  Laterality: Left;    INSERTION OF TUNNELED CENTRAL VENOUS HEMODIALYSIS CATHETER N/A 1/27/2020    Procedure: Insertion, Catheter, Central Venous, Hemodialysis;  Surgeon: ESTEBAN Gomez III, MD;  Location: [facility] CATH LAB;  Service: Peripheral Vascular;  Laterality: N/A;    PERCUTANEOUS TRANSLUMINAL ANGIOPLASTY N/A 3/4/2021    Procedure: PTA (ANGIOPLASTY, PERCUTANEOUS, TRANSLUMINAL);  Surgeon: Teddy Huber MD;  Location: [facility] OR;  Service: Vascular;  Laterality: N/A;    REMOVAL OF ARTERIOVENOUS GRAFT Left 5/27/2021    Procedure: REMOVAL, GRAFT, LEFT LOWER EXTREMITY, WOUND EXPLORATION;  Surgeon: Teddy Huber MD;  [facility];  Service: Vascular;  Laterality: Left;    REMOVAL OF NAIL OF DIGIT Left 3/9/2021    Procedure: REMOVAL, NAIL, DIGIT;  Surgeon: Rosio Mayes DPM;  Location: [facility] OR;  Service: Podiatry;  Laterality: Left;    THROMBECTOMY Left 3/4/2021    Procedure: THROMBECTOMY, LEFT LOWER EXTREMITY BYPASS GRAFT, ANGIOGRAM, POSSIBLE INTERVENTION, POSSIBLE LEFT LOWER EXTREMITY BYPASS;  Surgeon: Teddy Huber MD;  Location: [facility] OR;  Service: Vascular;  Laterality: Left;    THROMBECTOMY Left 4/29/2021    Procedure: GRAFT THROMBECTOMY, LEFT LOWER EXTREMITY;  Surgeon: Teddy Huber MD;  [address]  Service: Vascular;  Laterality: Left;  14.5 min  1179.85 mGy  341.01 Gycm2  240 ml dye    THROMBECTOMY  10/22/2021    Procedure: THROMBECTOMY;  Surgeon: Saad Arenas MD;  Location: [facility] CATH LAB/EP;  Service: Cardiology;;       NEUROLOGIC HISTORY  Seizures: denies   Head trauma: denies     FAMILY HISTORY   Family History   Problem Relation Age of Onset    Diabetes Mother     Diabetes Father     Heart disease Maternal Grandmother     No Known Problems Maternal Grandfather     Diabetes Paternal Grandmother     No Known Problems Paternal Grandfather     Anesthesia  problems Neg Hx        ALLERGIES   Review of patient's allergies indicates:   Allergen Reactions    Ciprofloxacin Itching    Contrast media      Kidney injury    Iodine      Kidney injury       CURRENT MEDICATION REGIMEN   Home Meds:   Prior to Admission medications    Medication Sig Start Date End Date Taking? Authorizing Provider   acetaminophen (TYLENOL) 500 MG tablet Take 2 tablets (1,000 mg total) by mouth every 8 (eight) hours as needed for Pain. 6/25/21  Yes Ruma Schulte MD   allopurinoL (ZYLOPRIM) 100 MG tablet Take 1 tablet (100 mg total) by mouth once daily. 7/31/20  Yes Hitesh Mason MD   atorvastatin (LIPITOR) 80 MG tablet Take 1 tablet (80 mg total) by mouth every evening. 6/8/21 6/8/22 Yes NONI Hanna MD   clopidogreL (PLAVIX) 75 mg tablet Take 1 tablet (75 mg total) by mouth once daily. 10/22/21  Yes Saad Arenas MD   EScitalopram oxalate (LEXAPRO) 10 MG tablet Take 1 tablet (10 mg total) by mouth once daily. 6/9/21 6/9/22 Yes NONI Hanna MD   furosemide (LASIX) 40 MG tablet Take 40 mg by mouth 2 (two) times daily.   Yes Historical Provider   insulin aspart U-100 (NOVOLOG FLEXPEN U-100 INSULIN) 100 unit/mL (3 mL) InPn pen Inject 5 Units into the skin 3 (three) times daily with meals. If not eating, ok to hold 4/13/21  Yes Maria Del Rosario Moreira NP   insulin detemir U-100 (LEVEMIR FLEXTOUCH) 100 unit/mL (3 mL) SubQ InPn pen Inject 8 Units into the skin once daily.  Patient taking differently: Inject 10 Units into the skin every evening. 6/9/21 6/9/22 Yes NONI Hanna MD   pregabalin (LYRICA) 50 MG capsule Take 1 capsule (50 mg total) by mouth 3 (three) times daily. 12/3/21 6/3/22 Yes Fawad Jameson MD   sodium bicarbonate 650 MG tablet Take 1 tablet (650 mg total) by mouth 2 (two) times daily. 7/1/21 7/1/22 Yes Ruma Schulte MD   alogliptin (NESINA) 25 mg Tab Take 1 tablet (25 mg) by mouth once daily. 6/8/21   NONI Hanna MD   apixaban (ELIQUIS) 5 mg Tab Take 1  tablet (5 mg total) by mouth 2 (two) times daily. 8/23/21   Saad Arenas MD   blood-glucose meter,continuous (DEXCOM G6 ) Misc Use to check blood sugars 4 times/day 12/3/21   Fawad Jameson MD   blood-glucose sensor (DEXCOM G6 SENSOR) Radha Apply one sensor to skin every 10 days 12/3/21   Fawad Jameson MD   blood-glucose transmitter (DEXCOM G6 TRANSMITTER) Radha Replace transmitter every 3 months to use with dexcom sensor 12/3/21   Fawad Jameson MD   collagenase (SANTYL) ointment Apply topically once daily. Nursing to cleanse L groin and 2 areas to left AKA incision site with sterile normal saline,  then apply a thin layer of Santyl ointment to affected area daily. Cover with bordered foam island mepore dressing.  Patient not taking: Reported on 12/21/2021 6/26/21   Ruma Schulte MD   magnesium oxide (MAG-OX) 400 mg (241.3 mg magnesium) tablet Take 1 tablet (400 mg total) by mouth 2 (two) times daily.  Patient not taking: No sig reported 6/25/21   Ruma Schulte MD   melatonin (MELATIN) 3 mg tablet Take 2 tablets (6 mg total) by mouth nightly as needed for Insomnia.  Patient not taking: No sig reported 6/25/21   Ruma Schulte MD   mirtazapine (REMERON) 7.5 MG Tab Take 1 tablet (7.5 mg total) by mouth every evening. 6/8/21 6/8/22  NONI Hanna MD   nystatin (MYCOSTATIN) powder Apply topically 2 (two) times daily.  Patient not taking: Reported on 12/21/2021 6/8/21   NONI Hanna MD   lancing device Misc 1 Device by Misc.(Non-Drug; Combo Route) route 2 (two) times daily with meals. 5/7/18 2/24/21  Rosales Barton MD       OTC Meds: intermittent Tylenol PM     Scheduled Meds:    allopurinoL  100 mg Oral Daily    amLODIPine  5 mg Oral Daily    atorvastatin  80 mg Oral QHS    EScitalopram oxalate  10 mg Oral Daily    furosemide  40 mg Oral Daily    insulin detemir U-100  5 Units Subcutaneous QHS    mirtazapine  7.5 mg Oral QHS    polyethylene glycol  17 g Oral Daily     pregabalin  50 mg Oral TID    senna-docusate 8.6-50 mg  1 tablet Oral BID      PRN Meds: sodium chloride, acetaminophen, dextrose 50%, dextrose 50%, glucagon (human recombinant), glucose, glucose, hydrALAZINE, influenza, insulin aspart U-100, melatonin, morphine, naloxone, ondansetron, oxyCODONE-acetaminophen, pneumoc 13-azam conj-dip cr(PF), sars-cov-2 (covid-19), sodium chloride 0.9%   Psychotherapeutics (From admission, onward)            Start     Stop Route Frequency Ordered    12/22/21 0900  EScitalopram oxalate tablet 10 mg         -- Oral Daily 12/21/21 2024 12/21/21 2130  mirtazapine tablet 7.5 mg         -- Oral Nightly 12/21/21 2024          LABORATORY DATA   Recent Results (from the past 72 hour(s))   POCT glucose    Collection Time: 12/27/21 12:03 PM   Result Value Ref Range    POCT Glucose 120 (H) 70 - 110 mg/dL   POCT glucose    Collection Time: 12/27/21  4:37 PM   Result Value Ref Range    POCT Glucose 173 (H) 70 - 110 mg/dL   POCT glucose    Collection Time: 12/27/21  7:27 PM   Result Value Ref Range    POCT Glucose 160 (H) 70 - 110 mg/dL   POCT glucose    Collection Time: 12/28/21  3:24 AM   Result Value Ref Range    POCT Glucose 135 (H) 70 - 110 mg/dL   Vancomycin, random    Collection Time: 12/28/21  8:52 AM   Result Value Ref Range    Vancomycin, Random 19.8 Not established ug/mL   POCT glucose    Collection Time: 12/28/21 11:57 AM   Result Value Ref Range    POCT Glucose 134 (H) 70 - 110 mg/dL   POCT glucose    Collection Time: 12/28/21  5:28 PM   Result Value Ref Range    POCT Glucose 120 (H) 70 - 110 mg/dL   POCT glucose    Collection Time: 12/29/21  4:18 AM   Result Value Ref Range    POCT Glucose 99 70 - 110 mg/dL   Basic metabolic panel    Collection Time: 12/29/21  7:28 AM   Result Value Ref Range    Sodium 138 136 - 145 mmol/L    Potassium 3.7 3.5 - 5.1 mmol/L    Chloride 107 95 - 110 mmol/L    CO2 18 (L) 23 - 29 mmol/L    Glucose 79 70 - 110 mg/dL    BUN 21 (H) 6 - 20 mg/dL     Creatinine 1.4 0.5 - 1.4 mg/dL    Calcium 7.9 (L) 8.7 - 10.5 mg/dL    Anion Gap 13 8 - 16 mmol/L    eGFR if African American 49 (A) >60 mL/min/1.73 m^2    eGFR if non African American 42 (A) >60 mL/min/1.73 m^2   CBC auto differential    Collection Time: 12/29/21  7:28 AM   Result Value Ref Range    WBC 14.82 (H) 3.90 - 12.70 K/uL    RBC 2.71 (L) 4.00 - 5.40 M/uL    Hemoglobin 7.9 (L) 12.0 - 16.0 g/dL    Hematocrit 26.1 (L) 37.0 - 48.5 %    MCV 96 82 - 98 fL    MCH 29.2 27.0 - 31.0 pg    MCHC 30.3 (L) 32.0 - 36.0 g/dL    RDW 14.1 11.5 - 14.5 %    Platelets 603 (H) 150 - 450 K/uL    MPV 10.1 9.2 - 12.9 fL    Immature Granulocytes 0.7 (H) 0.0 - 0.5 %    Gran # (ANC) 11.4 (H) 1.8 - 7.7 K/uL    Immature Grans (Abs) 0.10 (H) 0.00 - 0.04 K/uL    Lymph # 1.6 1.0 - 4.8 K/uL    Mono # 1.5 (H) 0.3 - 1.0 K/uL    Eos # 0.2 0.0 - 0.5 K/uL    Baso # 0.10 0.00 - 0.20 K/uL    nRBC 0 0 /100 WBC    Gran % 76.6 (H) 38.0 - 73.0 %    Lymph % 11.0 (L) 18.0 - 48.0 %    Mono % 9.9 4.0 - 15.0 %    Eosinophil % 1.1 0.0 - 8.0 %    Basophil % 0.7 0.0 - 1.9 %    Differential Method Automated    Magnesium    Collection Time: 12/29/21  7:28 AM   Result Value Ref Range    Magnesium 1.9 1.6 - 2.6 mg/dL   POCT glucose    Collection Time: 12/29/21 12:10 PM   Result Value Ref Range    POCT Glucose 108 70 - 110 mg/dL   POCT glucose    Collection Time: 12/29/21  8:11 PM   Result Value Ref Range    POCT Glucose 112 (H) 70 - 110 mg/dL   POCT glucose    Collection Time: 12/30/21  5:59 AM   Result Value Ref Range    POCT Glucose 60 (L) 70 - 110 mg/dL   POCT glucose    Collection Time: 12/30/21  7:59 AM   Result Value Ref Range    POCT Glucose 99 70 - 110 mg/dL   CBC auto differential    Collection Time: 12/30/21  8:16 AM   Result Value Ref Range    WBC 17.84 (H) 3.90 - 12.70 K/uL    RBC 2.56 (L) 4.00 - 5.40 M/uL    Hemoglobin 7.5 (L) 12.0 - 16.0 g/dL    Hematocrit 24.9 (L) 37.0 - 48.5 %    MCV 97 82 - 98 fL    MCH 29.3 27.0 - 31.0 pg    MCHC 30.1 (L) 32.0 -  "36.0 g/dL    RDW 14.2 11.5 - 14.5 %    Platelets 593 (H) 150 - 450 K/uL    MPV 10.1 9.2 - 12.9 fL    Immature Granulocytes 0.6 (H) 0.0 - 0.5 %    Gran # (ANC) 14.3 (H) 1.8 - 7.7 K/uL    Immature Grans (Abs) 0.11 (H) 0.00 - 0.04 K/uL    Lymph # 1.5 1.0 - 4.8 K/uL    Mono # 1.8 (H) 0.3 - 1.0 K/uL    Eos # 0.1 0.0 - 0.5 K/uL    Baso # 0.06 0.00 - 0.20 K/uL    nRBC 0 0 /100 WBC    Gran % 80.0 (H) 38.0 - 73.0 %    Lymph % 8.6 (L) 18.0 - 48.0 %    Mono % 10.0 4.0 - 15.0 %    Eosinophil % 0.5 0.0 - 8.0 %    Basophil % 0.3 0.0 - 1.9 %    Differential Method Automated       No results found for: PHENYTOIN, PHENOBARB, VALPROATE, CBMZ      EXAMINATION    VITALS   Vitals:    12/30/21 0558 12/30/21 0723 12/30/21 0757 12/30/21 0800   BP: 123/70  (!) 100/58    BP Location:   Right arm    Patient Position: Sitting  Lying    Pulse: 96  95 94   Resp:  18 18    Temp: 98.8 °F (37.1 °C)  98.1 °F (36.7 °C)    TempSrc: Oral  Oral    SpO2: 98%  98%    Weight:       Height:            CONSTITUTIONAL  General Appearance: NAD, unremarkable, age appropriate, normal weight, lying in bed    MUSCULOSKELETAL  Muscle Strength and Tone: Attempted but unable to assess due to medical acuity   Abnormal Involuntary Movements: none observed  Gait and Station: Attempted but unable to assess due to medical acuity     PSYCHIATRIC   Behavior/Cooperation:  Intermittently cooperative, reluctant to participate, eye contact normal, irritable   Speech:  irritable tone, normal rate, normal pitch, normal volume  Language: grossly intact, able to name, able to repeat with spontaneous speech  Mood: "mad"  Affect:  congruent with mood and irritable   Associations: intact; no CHANG  Thought Process: Linear and Organized; Perseverative   Thought Content: denies SI, HI, AH, VH, TH, delusions, or paranoia (no RIS observed)  Sensorium:  Awake  Alert and Oriented: to person, place, situation, month of year, year  Memory: 3/3 immediate, 2/3 at 5 minutes    Recent: Intact; able " to report recent events   Remote: Limited / Intact; Named 3/4 past presidents   Attention/concentration: Fair.  Requiring redirection.  Able to spell w-o-r-l-d but NOT d-l-r-o-w.   Similarities: Intact; (difference between apple and orange?)  Abstract reasoning: Intact  Insight: Limited / Intact  Judgment: Limited / Intact    CAM ICU Delirium Assessment - NEGATIVE      Is the patient aware of the biomedical complications associated with substance abuse and mental illness? yes        MEDICAL DECISION MAKING    ASSESSMENT      MDD, recurrent, moderate  CAROLIN  Insomnia  Adjustment Disorder with mixed disturbance of emotions and conduct     RECOMMENDATIONS       - Patient does not currently meet PEC/CEC criteria due to not being an imminent threat to self/others and not being gravely disabled 2/2 mental illness.      - Discontinue Lexapro (due to lack of improvement per pt and prolonged QTc) and begin Zoloft 50 mg PO daily for mood/anxiety (discussed risks/benefits/alt vs no treatment with patient).    - Increase Remeron to 15 mg PO QHS for mood/anxiety/sleep/appetite (discussed risks/benefits/alt vs no treatment with patient).    - Continue Melatonin per primary team for sleep/wake cycle regulation (discussed risks/benefits/alt vs no treatment with patient).    - Patient's most recent labs, imaging, and EKG were reviewed; will repeat EKG to assess for improvement in QTc prolongation.       - Psychotherapy was performed with patient as noted above with a focus on depression, anxiety, insomnia, and adjustment.      - Please have CM/SW assist patient with outpatient mental health f/u for s/p discharge from this facility (would benefit from medication management and psychotherapy).       - Patient was instructed to call 911 and return to the nearest ED if she begins feeling suicidal, homicidal, or gravely disabled (for s/p this hospitalization).       - Thank you for this consult ; will continue to follow patient          Total time spent with patient and/or managing/coordinating patient's care today (excluding the time spent on psychotherapy): 71 minutes   Time spent on psychotherapy today (as noted above): 18 minutes   Total time for encounter today including psychotherapy: 89 minutes      More than 50% of the time was spent counseling/coordinating care.     Consulting clinician was informed of the encounter and consult note.      STAFF:  Adalberto Gaston MD  [facility]   12/30/2021

## 2021-12-31 LAB
ANION GAP SERPL CALC-SCNC: 9 MMOL/L (ref 8–16)
BASOPHILS # BLD AUTO: 0.07 K/UL (ref 0–0.2)
BASOPHILS NFR BLD: 0.4 % (ref 0–1.9)
BUN SERPL-MCNC: 33 MG/DL (ref 6–20)
CALCIUM SERPL-MCNC: 7.6 MG/DL (ref 8.7–10.5)
CHLORIDE SERPL-SCNC: 106 MMOL/L (ref 95–110)
CO2 SERPL-SCNC: 20 MMOL/L (ref 23–29)
CREAT SERPL-MCNC: 1.5 MG/DL (ref 0.5–1.4)
DIFFERENTIAL METHOD: ABNORMAL
EOSINOPHIL # BLD AUTO: 0.1 K/UL (ref 0–0.5)
EOSINOPHIL NFR BLD: 0.5 % (ref 0–8)
ERYTHROCYTE [DISTWIDTH] IN BLOOD BY AUTOMATED COUNT: 14.1 % (ref 11.5–14.5)
EST. GFR  (AFRICAN AMERICAN): 45 ML/MIN/1.73 M^2
EST. GFR  (NON AFRICAN AMERICAN): 39 ML/MIN/1.73 M^2
GLUCOSE SERPL-MCNC: 111 MG/DL (ref 70–110)
HCT VFR BLD AUTO: 24.5 % (ref 37–48.5)
HGB BLD-MCNC: 7.5 G/DL (ref 12–16)
IMM GRANULOCYTES # BLD AUTO: 0.14 K/UL (ref 0–0.04)
IMM GRANULOCYTES NFR BLD AUTO: 0.7 % (ref 0–0.5)
LYMPHOCYTES # BLD AUTO: 1.8 K/UL (ref 1–4.8)
LYMPHOCYTES NFR BLD: 9.1 % (ref 18–48)
MAGNESIUM SERPL-MCNC: 1.9 MG/DL (ref 1.6–2.6)
MCH RBC QN AUTO: 29.8 PG (ref 27–31)
MCHC RBC AUTO-ENTMCNC: 30.6 G/DL (ref 32–36)
MCV RBC AUTO: 97 FL (ref 82–98)
MONOCYTES # BLD AUTO: 2.1 K/UL (ref 0.3–1)
MONOCYTES NFR BLD: 10.8 % (ref 4–15)
NEUTROPHILS # BLD AUTO: 15.5 K/UL (ref 1.8–7.7)
NEUTROPHILS NFR BLD: 78.5 % (ref 38–73)
NRBC BLD-RTO: 0 /100 WBC
PLATELET # BLD AUTO: 569 K/UL (ref 150–450)
PMV BLD AUTO: 10.1 FL (ref 9.2–12.9)
POCT GLUCOSE: 127 MG/DL (ref 70–110)
POCT GLUCOSE: 156 MG/DL (ref 70–110)
POCT GLUCOSE: 182 MG/DL (ref 70–110)
POTASSIUM SERPL-SCNC: 3.8 MMOL/L (ref 3.5–5.1)
RBC # BLD AUTO: 2.52 M/UL (ref 4–5.4)
SODIUM SERPL-SCNC: 135 MMOL/L (ref 136–145)
VANCOMYCIN SERPL-MCNC: 14.8 UG/ML
WBC # BLD AUTO: 19.7 K/UL (ref 3.9–12.7)

## 2021-12-31 PROCEDURE — 97530 THERAPEUTIC ACTIVITIES: CPT | Mod: CQ

## 2021-12-31 PROCEDURE — 80048 BASIC METABOLIC PNL TOTAL CA: CPT | Performed by: NURSE PRACTITIONER

## 2021-12-31 PROCEDURE — 97530 THERAPEUTIC ACTIVITIES: CPT

## 2021-12-31 PROCEDURE — 80202 ASSAY OF VANCOMYCIN: CPT | Performed by: HOSPITALIST

## 2021-12-31 PROCEDURE — 11000001 HC ACUTE MED/SURG PRIVATE ROOM

## 2021-12-31 PROCEDURE — 25000003 PHARM REV CODE 250: Performed by: SURGERY

## 2021-12-31 PROCEDURE — 27000207 HC ISOLATION

## 2021-12-31 PROCEDURE — 25000003 PHARM REV CODE 250: Performed by: PSYCHIATRY & NEUROLOGY

## 2021-12-31 PROCEDURE — A4216 STERILE WATER/SALINE, 10 ML: HCPCS | Performed by: SURGERY

## 2021-12-31 PROCEDURE — 85025 COMPLETE CBC W/AUTO DIFF WBC: CPT | Performed by: NURSE PRACTITIONER

## 2021-12-31 PROCEDURE — 36415 COLL VENOUS BLD VENIPUNCTURE: CPT | Performed by: HOSPITALIST

## 2021-12-31 PROCEDURE — 25000003 PHARM REV CODE 250: Performed by: HOSPITALIST

## 2021-12-31 PROCEDURE — 83735 ASSAY OF MAGNESIUM: CPT | Performed by: NURSE PRACTITIONER

## 2021-12-31 PROCEDURE — 25000003 PHARM REV CODE 250: Performed by: NURSE PRACTITIONER

## 2021-12-31 PROCEDURE — 63600175 PHARM REV CODE 636 W HCPCS: Performed by: HOSPITALIST

## 2021-12-31 PROCEDURE — 63600175 PHARM REV CODE 636 W HCPCS: Performed by: NURSE PRACTITIONER

## 2021-12-31 RX ADMIN — MELATONIN TAB 3 MG 6 MG: 3 TAB at 09:12

## 2021-12-31 RX ADMIN — MORPHINE SULFATE 2 MG: 2 INJECTION, SOLUTION INTRAMUSCULAR; INTRAVENOUS at 09:12

## 2021-12-31 RX ADMIN — PREGABALIN 50 MG: 50 CAPSULE ORAL at 04:12

## 2021-12-31 RX ADMIN — PIPERACILLIN AND TAZOBACTAM 4.5 G: 4; .5 INJECTION, POWDER, LYOPHILIZED, FOR SOLUTION INTRAVENOUS; PARENTERAL at 04:12

## 2021-12-31 RX ADMIN — Medication 10 ML: at 09:12

## 2021-12-31 RX ADMIN — SERTRALINE HYDROCHLORIDE 50 MG: 50 TABLET ORAL at 08:12

## 2021-12-31 RX ADMIN — AMLODIPINE BESYLATE 5 MG: 5 TABLET ORAL at 08:12

## 2021-12-31 RX ADMIN — PIPERACILLIN AND TAZOBACTAM 4.5 G: 4; .5 INJECTION, POWDER, LYOPHILIZED, FOR SOLUTION INTRAVENOUS; PARENTERAL at 01:12

## 2021-12-31 RX ADMIN — DOCUSATE SODIUM AND SENNOSIDES 1 TABLET: 8.6; 5 TABLET, FILM COATED ORAL at 08:12

## 2021-12-31 RX ADMIN — PREGABALIN 50 MG: 50 CAPSULE ORAL at 09:12

## 2021-12-31 RX ADMIN — PIPERACILLIN AND TAZOBACTAM 4.5 G: 4; .5 INJECTION, POWDER, LYOPHILIZED, FOR SOLUTION INTRAVENOUS; PARENTERAL at 09:12

## 2021-12-31 RX ADMIN — INSULIN DETEMIR 5 UNITS: 100 INJECTION, SOLUTION SUBCUTANEOUS at 09:12

## 2021-12-31 RX ADMIN — MIRTAZAPINE 15 MG: 7.5 TABLET ORAL at 09:12

## 2021-12-31 RX ADMIN — OXYCODONE AND ACETAMINOPHEN 1 TABLET: 10; 325 TABLET ORAL at 10:12

## 2021-12-31 RX ADMIN — ALLOPURINOL 100 MG: 100 TABLET ORAL at 08:12

## 2021-12-31 RX ADMIN — OXYCODONE AND ACETAMINOPHEN 1 TABLET: 10; 325 TABLET ORAL at 09:12

## 2021-12-31 RX ADMIN — ATORVASTATIN CALCIUM 80 MG: 40 TABLET, FILM COATED ORAL at 09:12

## 2021-12-31 RX ADMIN — PREGABALIN 50 MG: 50 CAPSULE ORAL at 08:12

## 2021-12-31 RX ADMIN — MORPHINE SULFATE 2 MG: 2 INJECTION, SOLUTION INTRAMUSCULAR; INTRAVENOUS at 05:12

## 2021-12-31 RX ADMIN — FUROSEMIDE 40 MG: 40 TABLET ORAL at 08:12

## 2021-12-31 RX ADMIN — MORPHINE SULFATE 2 MG: 2 INJECTION, SOLUTION INTRAMUSCULAR; INTRAVENOUS at 11:12

## 2021-12-31 RX ADMIN — POLYETHYLENE GLYCOL 3350 17 G: 17 POWDER, FOR SOLUTION ORAL at 08:12

## 2021-12-31 RX ADMIN — VANCOMYCIN HYDROCHLORIDE 500 MG: 500 INJECTION, POWDER, LYOPHILIZED, FOR SOLUTION INTRAVENOUS at 08:12

## 2021-12-31 NOTE — PT/OT/SLP PROGRESS
"Occupational Therapy   Treatment    Name: Suyapa Connelly  MRN: [id number]  Admitting Diagnosis:  S/P BKA (below knee amputation) unilateral, right  3 Days Post-Op    Recommendations:     Discharge Recommendations: rehabilitation facility  Discharge Equipment Recommendations:  other (see comments),bath bench (drop-arm BSC)  Barriers to discharge:  Other (Comment) (Pt requires increased assistance at this time)    Assessment:     Suyapa Connelly is a 56 y.o. female with a medical diagnosis of S/P BKA (below knee amputation) unilateral, right.  She presents with The primary encounter diagnosis was Gangrene. Diagnoses of Pain, Cold foot, Cellulitis, unspecified cellulitis site, Osteomyelitis of right foot, unspecified type, Chest pain, Uremia, Osteomyelitis of right foot, Gangrene of extremity, Diabetic ulcer of right foot associated with type 2 diabetes mellitus, with necrosis of muscle, unspecified part of foot, Diabetic ulcer of right midfoot associated with type 2 diabetes mellitus, with muscle involvement without evidence of necrosis, and QT prolongation were also pertinent to this visit. Performance deficits affecting function are weakness,impaired functional mobilty,gait instability,decreased upper extremity function,decreased lower extremity function,impaired endurance,decreased safety awareness,impaired self care skills,pain,impaired skin,edema,impaired balance,decreased coordination.     Pt found up in bedside chair upon OT entrance to room. Pt required MaxA x2 for squat pivot t/f back to bed. Pt initially with c/o 6/10 pain. Pt stating, "I'm not feeling usual upbeat self". Pt required Venecia to roll B for hygiene & diaper management. Pt left with RLE elevated and pt re-eduated on positioning.     Rehab Prognosis:  Good; patient would benefit from acute skilled OT services to address these deficits and reach maximum level of function.       Plan:     Patient to be seen 3 x/week to address the above listed " "problems via self-care/home management,therapeutic activities,therapeutic exercises  · Plan of Care Expires: 01/29/22  · Plan of Care Reviewed with: patient    Subjective     Pain/Comfort:  · Pain Rating 1: 6/10  · Location - Side 1: Right  · Location 1: leg  · Pain Addressed 1: Reposition,Distraction,Cessation of Activity,Pre-medicate for activity,Nurse notified  · Pain Rating Post-Intervention 1: other (see comments) (not rated; pt tearful)    Objective:     Communicated with: nsg prior to session.  Patient found up in chair with bed alarm,peripheral IV,PureWick upon OT entry to room.    General Precautions: Standard, fall   Orthopedic Precautions:N/A   Braces: N/A  Respiratory Status: Nasal cannula     Occupational Performance:     Bed Mobility:    · Patient completed Rolling/Turning to Left with  minimum assistance  · Patient completed Rolling/Turning to Right with minimum assistance  · Patient completed Scooting/Bridging with maximal assistance and to scoot to HOB with use of draw sheet & pt using B hand rails persons     Functional Mobility/Transfers:  · Patient completed Chair <> Bed Squat Pivot technique with maximal assistance and of 2 persons with no assistive device    Activities of Daily Living:  · Pt required MaxA for hygiene & diaper management in supine 2/2 soiled diaper after t/f back to bed.       [facility] 6 Click ADL: 17    Treatment & Education:  Pt found up in bedside chair upon OT entrance to room.   Pt required MaxA x2 for squat pivot t/f back to bed.   Pt initially with c/o 6/10 pain.   Pt stating, "I'm not feeling usual upbeat self".   Pt required Venecia to roll B for hygiene & diaper management.   Pt left with RLE elevated and pt re-eduated on positioning.     Patient left HOB elevated with all lines intact, call button in reach, bed alarm on and nsg notifiedEducation:      GOALS:   Multidisciplinary Problems     Occupational Therapy Goals        Problem: Occupational Therapy Goal    Goal " Priority Disciplines Outcome Interventions   Occupational Therapy Goal     OT, PT/OT Ongoing, Progressing    Description: Goals to be met by: 1/29/2022     Patient will increase functional independence with ADLs by performing:    UE Dressing with Modified [place].  Grooming while seated with Modified [place].  Toileting from bedside commode with Modified [place] for hygiene and clothing management.   Rolling to Bilateral with Modified [place].   Supine to sit with Modified [place].  Toilet transfer to bedside commode with Minimal Assistance.                     Time Tracking:     OT Date of Treatment: 12/31/21  OT Start Time: 1147  OT Stop Time: 1206  OT Total Time (min): 19 min    Billable Minutes:Therapeutic Activity 19    OT/MACKENZIE: OT     MACKENZIE Visit Number: 0    12/31/2021

## 2021-12-31 NOTE — ASSESSMENT & PLAN NOTE
Gangrene  Osteomyelitis  Diabetic Foot ulcer  PVD    - Dry gangrene right foot, osteo on MRI on presentation  - started on vanc, flagyl, and cefepime  - eliquis held  - ID, cardiology, and general surgery consulted  - BKA on 12/28  - antibiotics stopped post BKA per ID recs  - pain meds adjusted  - nutritional supplements added for wound healing  - PT/OT consulted  - discussed with general surgery today who reports wound looks good  - restart vanc and zosyn out of precaution due to elevated WBC 17, , soft pressures  - lactic and blood cultures ordered  - small LR bolus   - IPR if remains stable

## 2021-12-31 NOTE — ASSESSMENT & PLAN NOTE
Expresses depression and anger about losing both of her legs this year despite being compliant and doing everything asked of her. (She presented with gangrenous foot after not changing her wound dressing, not attending any follow up appts, and not taking medications for months)  Consult psych for eval, Appreciate recs  increase remeron to 15mg at night  Start zoloft 50mg daily  D/c lexapro

## 2021-12-31 NOTE — PROGRESS NOTES
[facility]  Telemedicine Progress Note    Patient Name: Suyapa Connelly  MRN: [id number]  Patient Class: IP- Inpatient   Admission Date: 12/21/2021  Length of Stay: 9 days  Attending Physician: Leslie Fallon MD  Primary Care Provider: Magen Christensen MD          Subjective:     Principal Problem:S/P BKA (below knee amputation) unilateral, right        HPI:  Suyapa Connelly is a 57 yo female with a pmh of DM2, PVD, osteomyelitis, right AKA, CKD3, CAD s/p CABG, afib on eliquis. She presented with right foot pain x 1 month. She has an ulcer to her right heel. Presented with dressing to right heel which was removed and had purulent foul smelling drainage noted. The dressing was not changed in 2 months since she last saw Dr. Askew on 10/22/21 for arthrectomy, thrombectomy, and PTA with DCB to right lower extremity. Had not gone to follow up appts. She now has what appears to be dry gangrene to right toes with bone exposure. She has not been compliant with medications and states she had not taken eliquis in over a month. She states she has not been able to stand due to right leg weakness since her left AKA in May 2021. She also has a pressure wound to the lateral right lower leg. She takes her 's percocet for pain, which was helping some.       Overview/Hospital Course:  No notes on file    Interval History: Seen virtually. No acute events overnight. Very upset again today about losing her legs. Seen by psych this morning. BP running soft with increased WBC today. Denies chills or fevers. Did have low grade temp 100.4 last night.       Review of Systems   Constitutional: Negative for chills and fever.   Respiratory: Negative for cough and shortness of breath.    Cardiovascular: Negative for chest pain and leg swelling.   Gastrointestinal: Negative for abdominal pain and nausea.   Genitourinary: Negative for difficulty urinating.   Musculoskeletal: Positive for arthralgias and  myalgias.        Right leg pain   Skin: Positive for wound.   Neurological: Positive for weakness.   Psychiatric/Behavioral: Positive for dysphoric mood.        Depressed     Objective:     Vital Signs (Most Recent):  Temp: 99.3 °F (37.4 °C) (12/30/21 2020)  Pulse: 104 (12/30/21 2035)  Resp: 18 (12/30/21 2251)  BP: 137/70 (12/30/21 2020)  SpO2: 99 % (12/30/21 2020) Vital Signs (24h Range):  Temp:  [96.7 °F (35.9 °C)-99.3 °F (37.4 °C)] 99.3 °F (37.4 °C)  Pulse:  [] 104  Resp:  [18] 18  SpO2:  [96 %-100 %] 99 %  BP: (100-137)/(55-70) 137/70     Weight: 79.2 kg (174 lb 9.7 oz)  Body mass index is 29.06 kg/m².    Intake/Output Summary (Last 24 hours) at 12/30/2021 2314  Last data filed at 12/30/2021 0700  Gross per 24 hour   Intake --   Output 400 ml   Net -400 ml      Physical Exam  Vitals and nursing note reviewed.   Constitutional:       General: She is not in acute distress.     Appearance: She is obese.   Cardiovascular:      Rate and Rhythm: Normal rate.   Pulmonary:      Effort: Pulmonary effort is normal. No respiratory distress.   Musculoskeletal:      Cervical back: Normal range of motion.      Comments: Bilateral leg amputations   Skin:     General: Skin is dry.      Comments: Right leg BKA   Neurological:      Mental Status: She is alert and oriented to person, place, and time.      Motor: Weakness present.   Psychiatric:         Behavior: Behavior normal.         Thought Content: Thought content normal.      Comments: Depressed about losing leg         Significant Labs:   All pertinent labs within the past 24 hours have been reviewed.  Blood Culture: No results for input(s): LABBLOO in the last 48 hours.  BMP:   Recent Labs   Lab 12/29/21  0728 12/29/21  0728 12/30/21  0816   GLU 79   < > 82      < > 136   K 3.7   < > 3.1*      < > 105   CO2 18*   < > 19*   BUN 21*   < > 22*   CREATININE 1.4   < > 1.4   CALCIUM 7.9*   < > 8.3*   MG 1.9  --   --     < > = values in this interval not  displayed.     CBC:   Recent Labs   Lab 12/29/21  0728 12/30/21  0816   WBC 14.82* 17.84*   HGB 7.9* 7.5*   HCT 26.1* 24.9*   * 593*     CMP:   Recent Labs   Lab 12/29/21  0728 12/30/21  0816    136   K 3.7 3.1*    105   CO2 18* 19*   GLU 79 82   BUN 21* 22*   CREATININE 1.4 1.4   CALCIUM 7.9* 8.3*   ANIONGAP 13 12   EGFRNONAA 42* 42*     Lactic Acid:   Recent Labs   Lab 12/30/21  1412   LACTATE 1.0       Significant Imaging: I have reviewed all pertinent imaging results/findings within the past 24 hours.      Assessment/Plan:      * S/P BKA (below knee amputation) unilateral, right  Gangrene  Osteomyelitis  Diabetic Foot ulcer  PVD    - Dry gangrene right foot, osteo on MRI on presentation  - started on vanc, flagyl, and cefepime  - eliquis held  - ID, cardiology, and general surgery consulted  - BKA on 12/28  - antibiotics stopped post BKA per ID recs  - pain meds adjusted  - nutritional supplements added for wound healing  - PT/OT consulted  - discussed with general surgery today who reports wound looks good  - restart vanc and zosyn out of precaution due to elevated WBC 17, , soft pressures  - lactic and blood cultures ordered  - small LR bolus   - IPR if remains stable       Osteomyelitis of right foot  See s/p BKA      Diabetic ulcer of right foot associated with type 2 diabetes mellitus  See s/p BKA      Cellulitis  See S/p BKA        Stage 3 chronic kidney disease due to type 2 diabetes mellitus  - At baseline  - Renally dose meds      Chronic diastolic heart failure    - Cont lasix daily  - denies SOB on room air  - no hypoxia noted      Chronic anemia  - Hgb drop from 13 to 7 over 2 month period  - Type and screen  - Hold plavix and eliquis (had not been taking)  - Denies acute blood loss  - s/p 1 U PRBC 12/23  -type and screen, post op BKA      Hypokalemia  replete      Critical lower limb ischemia  - Had intervention to RLE in October  - patient non-compliant with medications,  wound care, and follow up appts  - Persistent non-healing diabetic right foot wounds   - Arterial US showing stenosis   - Cardiology following  - BKA on 12/28/21              Paroxysmal atrial fibrillation  - NSR on tele  - Noncompliant with eliquis  - Cont to hold for now s/p RLE amptutation      Type 2 diabetes mellitus with hyperglycemia, with long-term current use of insulin  - Hold home meds  - Detemir 5u nightly  - Accuchecks and ISS  - A1C 9.6        MDD (major depressive disorder), recurrent episode, moderate  Expresses depression and anger about losing both of her legs this year despite being compliant and doing everything asked of her. (She presented with gangrenous foot after not changing her wound dressing, not attending any follow up appts, and not taking medications for months)  Consult psych for eval, Appreciate recs  increase remeron to 15mg at night  Start zoloft 50mg daily  D/c lexapro      Essential hypertension  - amlodipine 5mg daily -- hold today for soft BP  - Monitor pressures      Gangrene  See S/p right BKA          VTE Risk Mitigation (From admission, onward)         Ordered     Reason for No Pharmacological VTE Prophylaxis  Once        Question:  Reasons:  Answer:  Already adequately anticoagulated on oral Anticoagulants    12/21/21 1910     IP VTE HIGH RISK PATIENT  Once         12/21/21 1910                      I have assessed these finding virtually using telemed platform and with assistance of bedside nurse                 The attending portion of this evaluation, treatment, and documentation was performed per Petra De Anda NP via Telemedicine AudioVisual using the secure [employer] software platform with 2 way audio/video. The provider was located off-site and the patient is located in the hospital. The aforementioned video software was utilized to document the relevant history and physical exam    Petra De Anda NP  Department of Hospital Medicine   [facility]

## 2021-12-31 NOTE — ASSESSMENT & PLAN NOTE
- Had intervention to RLE in October  - patient non-compliant with medications, wound care, and follow up appts  - Persistent non-healing diabetic right foot wounds   - Arterial US showing stenosis   - Cardiology following  - BKA on 12/28/21

## 2021-12-31 NOTE — PLAN OF CARE
Plan of care reviewed with patient. She is awake, alert, and oriented. She verbalized understanding. She is on room air and reads sinus rhythm on telemetry. She is S/P R BKA (L AKA this past May). Patient met with psych today as outlined via consult request. All medications administered as outlined in the MAR. All safety and isolation precautions observed/maintained.

## 2021-12-31 NOTE — ASSESSMENT & PLAN NOTE
- Hgb drop from 13 to 7 over 2 month period  - Type and screen  - Hold plavix and eliquis (had not been taking)  - Denies acute blood loss  - s/p 1 U PRBC 12/23  -type and screen, post op BKA

## 2021-12-31 NOTE — PROGRESS NOTES
"Surgery follow up  BP (!) 108/55 (BP Location: Left arm, Patient Position: Sitting)   Pulse (!) 111   Temp 99.4 °F (37.4 °C) (Oral)   Resp 18   Ht 5' 5" (1.651 m)   Wt 79.2 kg (174 lb 9.7 oz)   LMP 09/30/2015 (Exact Date)   SpO2 99%   BMI 29.06 kg/m²   I/O last 3 completed shifts:  In: -   Out: 400 [Urine:400]  No intake/output data recorded.      Recent Results (from the past 336 hour(s))   CBC Auto Differential    Collection Time: 12/31/21  5:48 AM   Result Value Ref Range    WBC 19.70 (H) 3.90 - 12.70 K/uL    Hemoglobin 7.5 (L) 12.0 - 16.0 g/dL    Hematocrit 24.5 (L) 37.0 - 48.5 %    Platelets 569 (H) 150 - 450 K/uL   CBC auto differential    Collection Time: 12/30/21  8:16 AM   Result Value Ref Range    WBC 17.84 (H) 3.90 - 12.70 K/uL    Hemoglobin 7.5 (L) 12.0 - 16.0 g/dL    Hematocrit 24.9 (L) 37.0 - 48.5 %    Platelets 593 (H) 150 - 450 K/uL   CBC auto differential    Collection Time: 12/29/21  7:28 AM   Result Value Ref Range    WBC 14.82 (H) 3.90 - 12.70 K/uL    Hemoglobin 7.9 (L) 12.0 - 16.0 g/dL    Hematocrit 26.1 (L) 37.0 - 48.5 %    Platelets 603 (H) 150 - 450 K/uL   wbc increased , on iv antibiotics, continue preent treatment.  "

## 2021-12-31 NOTE — PLAN OF CARE
"Pt found up in bedside chair upon OT entrance to room. Pt required MaxA x2 for squat pivot t/f back to bed. Pt initially with c/o 6/10 pain. Pt stating, "I'm not feeling usual upbeat self". Pt required Venecia to roll B for hygiene & diaper management. Pt left with RLE elevated and pt re-eduated on positioning.     Problem: Occupational Therapy Goal  Goal: Occupational Therapy Goal  Description: Goals to be met by: 1/29/2022     Patient will increase functional independence with ADLs by performing:    UE Dressing with Modified [place].  Grooming while seated with [place].  Toileting from bedside commode with Modified [place] for hygiene and clothing management.   Rolling to Bilateral with Modified [place].   Supine to sit with Modified [place].  Toilet transfer to bedside commode with Minimal Assistance.    Outcome: Ongoing, Progressing     "

## 2021-12-31 NOTE — SUBJECTIVE & OBJECTIVE
Interval History: Seen virtually. No acute events overnight. Very upset again today about losing her legs. Seen by psych this morning. BP running soft with increased WBC today. Denies chills or fevers. Did have low grade temp 100.4 last night.       Review of Systems   Constitutional: Negative for chills and fever.   Respiratory: Negative for cough and shortness of breath.    Cardiovascular: Negative for chest pain and leg swelling.   Gastrointestinal: Negative for abdominal pain and nausea.   Genitourinary: Negative for difficulty urinating.   Musculoskeletal: Positive for arthralgias and myalgias.        Right leg pain   Skin: Positive for wound.   Neurological: Positive for weakness.   Psychiatric/Behavioral: Positive for dysphoric mood.        Depressed     Objective:     Vital Signs (Most Recent):  Temp: 99.3 °F (37.4 °C) (12/30/21 2020)  Pulse: 104 (12/30/21 2035)  Resp: 18 (12/30/21 2251)  BP: 137/70 (12/30/21 2020)  SpO2: 99 % (12/30/21 2020) Vital Signs (24h Range):  Temp:  [96.7 °F (35.9 °C)-99.3 °F (37.4 °C)] 99.3 °F (37.4 °C)  Pulse:  [] 104  Resp:  [18] 18  SpO2:  [96 %-100 %] 99 %  BP: (100-137)/(55-70) 137/70     Weight: 79.2 kg (174 lb 9.7 oz)  Body mass index is 29.06 kg/m².    Intake/Output Summary (Last 24 hours) at 12/30/2021 2314  Last data filed at 12/30/2021 0700  Gross per 24 hour   Intake --   Output 400 ml   Net -400 ml      Physical Exam  Vitals and nursing note reviewed.   Constitutional:       General: She is not in acute distress.     Appearance: She is obese.   Cardiovascular:      Rate and Rhythm: Normal rate.   Pulmonary:      Effort: Pulmonary effort is normal. No respiratory distress.   Musculoskeletal:      Cervical back: Normal range of motion.      Comments: Bilateral leg amputations   Skin:     General: Skin is dry.      Comments: Right leg BKA   Neurological:      Mental Status: She is alert and oriented to person, place, and time.      Motor: Weakness present.    Psychiatric:         Behavior: Behavior normal.         Thought Content: Thought content normal.      Comments: Depressed about losing leg         Significant Labs:   All pertinent labs within the past 24 hours have been reviewed.  Blood Culture: No results for input(s): LABBLOO in the last 48 hours.  BMP:   Recent Labs   Lab 12/29/21 0728 12/29/21 0728 12/30/21  0816   GLU 79   < > 82      < > 136   K 3.7   < > 3.1*      < > 105   CO2 18*   < > 19*   BUN 21*   < > 22*   CREATININE 1.4   < > 1.4   CALCIUM 7.9*   < > 8.3*   MG 1.9  --   --     < > = values in this interval not displayed.     CBC:   Recent Labs   Lab 12/29/21 0728 12/30/21  0816   WBC 14.82* 17.84*   HGB 7.9* 7.5*   HCT 26.1* 24.9*   * 593*     CMP:   Recent Labs   Lab 12/29/21 0728 12/30/21  0816    136   K 3.7 3.1*    105   CO2 18* 19*   GLU 79 82   BUN 21* 22*   CREATININE 1.4 1.4   CALCIUM 7.9* 8.3*   ANIONGAP 13 12   EGFRNONAA 42* 42*     Lactic Acid:   Recent Labs   Lab 12/30/21  1412   LACTATE 1.0       Significant Imaging: I have reviewed all pertinent imaging results/findings within the past 24 hours.

## 2021-12-31 NOTE — PLAN OF CARE
Safety maintained, bed alarm on and call bell in reach. Telemetry monitoring in progress NSR noted. Scheduled medication given and tolerated well. PRN medication given for c/o of Rt stump pain. Dressing to Rt BKA clean,dry and intact. See MAR. Antibiotic therapy in progress.plan of care reviewed. Verbalized understanding. Will continue to monitor Q 2 hr and prn.

## 2021-12-31 NOTE — PT/OT/SLP PROGRESS
Physical Therapy Treatment    Patient Name:  Suyapa Connelly   MRN:  [id number]    Recommendations:     Discharge Recommendations:  rehabilitation facility   Discharge Equipment Recommendations:  (TBD)   Barriers to discharge: Impaired functional mobility.     Assessment:     Suyapa Connelly is a 56 y.o. female admitted with a medical diagnosis of S/P BKA (below knee amputation) unilateral, right.  She presents with the following impairments/functional limitations:  weakness,impaired functional mobilty,decreased coordination,impaired balance,impaired skin,impaired joint extensibility,pain.    Rehab Prognosis: Good; patient would benefit from acute skilled PT services to address these deficits and reach maximum level of function.    Recent Surgery: Procedure(s) (LRB):  AMPUTATION, BELOW KNEE (Right) 3 Days Post-Op    Plan:     During this hospitalization, patient to be seen 5 x/week to address the identified rehab impairments via gait training,therapeutic activities,therapeutic exercises,neuromuscular re-education,wheelchair management/training and progress toward the following goals:    · Plan of Care Expires:  01/29/22    Subjective     Chief Complaint:   Patient/Family Comments/goals: Pt verbalized that she need's her pain medicine.   Pain/Comfort:  · Pain Rating 1: 10/10  · Location - Side 1: Right  · Location 1: leg  · Pain Addressed 1: Pre-medicate for activity,Reposition,Distraction,Cessation of Activity      Objective:     Patient found HOB elevated with PureWick,peripheral IV upon PT entry to room.     General Precautions: Standard, fall   Orthopedic Precautions:N/A   Braces: N/A       Functional Mobility:  · Bed Mobility:     · Supine to Sit: moderate assistance  · Transfers:     · Bed to Chair: maximal assistance with  no AD  using  Stand Pivot  · Balance: Pt with fair sitting balance.       AM-PAC 6 CLICK MOBILITY  Turning over in bed (including adjusting bedclothes, sheets and blankets)?: 2  Sitting  down on and standing up from a chair with arms (e.g., wheelchair, bedside commode, etc.): 1  Moving from lying on back to sitting on the side of the bed?: 2  Moving to and from a bed to a chair (including a wheelchair)?: 2  Need to walk in hospital room?: 1  Climbing 3-5 steps with a railing?: 1  Basic Mobility Total Score: 9       Therapeutic Activities and Exercises:       Patient left up in chair with all lines intact and call button in reach..    GOALS:   Multidisciplinary Problems     Physical Therapy Goals        Problem: Physical Therapy Goal    Goal Priority Disciplines Outcome Goal Variances Interventions   Physical Therapy Goal     PT, PT/OT Ongoing, Progressing     Description: Goals to be met by: 22     Patient will increase functional independence with mobility by performin. Supine to sit with Minimal Assistance  2. Sit to supine with Minimal Assistance  3. Rolling to Left and Right with Stand-by Assistance.  4. Bed to WC transfer with Minimal Assistance using Slideboard.                     Time Tracking:     PT Received On: 21  PT Start Time: 1032     PT Stop Time: 1103  PT Total Time (min): 31 min     Billable Minutes: Therapeutic Activity 31    Treatment Type: Treatment  PT/PTA: PTA     PTA Visit Number: 1     2021

## 2021-12-31 NOTE — PLAN OF CARE
Problem: Adult Inpatient Plan of Care  Goal: Plan of Care Review  Outcome: Ongoing, Progressing   VN note:   Patient's chart, labs and vital signs reviewed, will be available to intervene if needed.

## 2021-12-31 NOTE — PROGRESS NOTES
Pharmacokinetic Assessment Follow Up: IV Vancomycin    Vancomycin serum concentration assessment(s):    The random level was drawn correctly and can be used to guide therapy at this time. The measurement is below the desired definitive target range of 15 to 20 mcg/mL.    Vancomycin Regimen Plan:    Vancomycin 500 mg IV once with a random concentration measured at 07:00 on 1-1    Drug levels (last 3 results):  Recent Labs   Lab Result Units 12/28/21  0852 12/30/21  0816 12/31/21  0548   Vancomycin, Random ug/mL 19.8  --  14.8   Vancomycin-Trough ug/mL  --  22.1*  --        Pharmacy will continue to follow and monitor vancomycin.    Please contact pharmacy at extension [phone] for questions regarding this assessment.    Thank you for the consult,   Ovidio Otero       Patient brief summary:  Suyapa Connelly is a 56 y.o. female initiated on antimicrobial therapy with IV Vancomycin for treatment of bone/joint infection      Drug Allergies:   Review of patient's allergies indicates:   Allergen Reactions    Ciprofloxacin Itching    Contrast media      Kidney injury    Iodine      Kidney injury       Actual Body Weight:   79.2 kg    Renal Function:   Estimated Creatinine Clearance: 47.2 mL/min (based on SCr of 1.4 mg/dL).,     Dialysis Method (if applicable):  N/A    CBC (last 72 hours):  Recent Labs   Lab Result Units 12/29/21  0728 12/30/21  0816 12/31/21  0548   WBC K/uL 14.82* 17.84* 19.70*   Hemoglobin g/dL 7.9* 7.5* 7.5*   Hematocrit % 26.1* 24.9* 24.5*   Platelets K/uL 603* 593* 569*   Gran % % 76.6* 80.0* 78.5*   Lymph % % 11.0* 8.6* 9.1*   Mono % % 9.9 10.0 10.8   Eosinophil % % 1.1 0.5 0.5   Basophil % % 0.7 0.3 0.4   Differential Method  Automated Automated Automated       Metabolic Panel (last 72 hours):  Recent Labs   Lab Result Units 12/29/21  0728 12/30/21  0816   Sodium mmol/L 138 136   Potassium mmol/L 3.7 3.1*   Chloride mmol/L 107 105   CO2 mmol/L 18* 19*   Glucose mg/dL 79 82   BUN mg/dL 21* 22*    Creatinine mg/dL 1.4 1.4   Magnesium mg/dL 1.9  --        Vancomycin Administrations:  vancomycin given in the last 96 hours                     vancomycin 500 mg in dextrose 5 % 100 mL IVPB (ready to mix system) (mg) 500 mg New Bag 12/29/21 1123     500 mg New Bag 12/28/21 1120    vancomycin injection (g) 1 g Given 12/28/21 1927    vancomycin 500 mg in dextrose 5 % 100 mL IVPB (ready to mix system) (mg) 500 mg New Bag 12/27/21 1023                    Microbiologic Results:  Microbiology Results (last 7 days)       Procedure Component Value Units Date/Time    Blood culture [[id number]] Collected: 12/30/21 1412    Order Status: Completed Specimen: Blood from Peripheral, Right Hand Updated: 12/31/21 0315     Blood Culture, Routine No Growth to date    Blood culture [[id number]] Collected: 12/30/21 1412    Order Status: Completed Specimen: Blood from Peripheral, Right Arm Updated: 12/31/21 0315     Blood Culture, Routine No Growth to date

## 2022-01-01 LAB
ALBUMIN SERPL BCP-MCNC: 1.1 G/DL (ref 3.5–5.2)
ANION GAP SERPL CALC-SCNC: 9 MMOL/L (ref 8–16)
BASOPHILS # BLD AUTO: 0.08 K/UL (ref 0–0.2)
BASOPHILS NFR BLD: 0.5 % (ref 0–1.9)
BLD PROD TYP BPU: NORMAL
BLOOD UNIT EXPIRATION DATE: NORMAL
BLOOD UNIT TYPE CODE: 5100
BLOOD UNIT TYPE: NORMAL
BUN SERPL-MCNC: 41 MG/DL (ref 6–20)
CALCIUM SERPL-MCNC: 7.5 MG/DL (ref 8.7–10.5)
CHLORIDE SERPL-SCNC: 104 MMOL/L (ref 95–110)
CO2 SERPL-SCNC: 21 MMOL/L (ref 23–29)
CODING SYSTEM: NORMAL
CREAT SERPL-MCNC: 1.8 MG/DL (ref 0.5–1.4)
DIFFERENTIAL METHOD: ABNORMAL
DISPENSE STATUS: NORMAL
EOSINOPHIL # BLD AUTO: 0.2 K/UL (ref 0–0.5)
EOSINOPHIL NFR BLD: 1.3 % (ref 0–8)
ERYTHROCYTE [DISTWIDTH] IN BLOOD BY AUTOMATED COUNT: 14.5 % (ref 11.5–14.5)
EST. GFR  (AFRICAN AMERICAN): 36 ML/MIN/1.73 M^2
EST. GFR  (NON AFRICAN AMERICAN): 31 ML/MIN/1.73 M^2
GLUCOSE SERPL-MCNC: 103 MG/DL (ref 70–110)
HCT VFR BLD AUTO: 23.1 % (ref 37–48.5)
HGB BLD-MCNC: 7 G/DL (ref 12–16)
IMM GRANULOCYTES # BLD AUTO: 0.11 K/UL (ref 0–0.04)
IMM GRANULOCYTES NFR BLD AUTO: 0.6 % (ref 0–0.5)
LYMPHOCYTES # BLD AUTO: 1.6 K/UL (ref 1–4.8)
LYMPHOCYTES NFR BLD: 9.2 % (ref 18–48)
MCH RBC QN AUTO: 30.2 PG (ref 27–31)
MCHC RBC AUTO-ENTMCNC: 30.3 G/DL (ref 32–36)
MCV RBC AUTO: 100 FL (ref 82–98)
MONOCYTES # BLD AUTO: 1.8 K/UL (ref 0.3–1)
MONOCYTES NFR BLD: 10.3 % (ref 4–15)
NEUTROPHILS # BLD AUTO: 13.4 K/UL (ref 1.8–7.7)
NEUTROPHILS NFR BLD: 78.1 % (ref 38–73)
NRBC BLD-RTO: 0 /100 WBC
NUM UNITS TRANS PACKED RBC: NORMAL
PHOSPHATE SERPL-MCNC: 3.2 MG/DL (ref 2.7–4.5)
PLATELET # BLD AUTO: 532 K/UL (ref 150–450)
PMV BLD AUTO: 10.4 FL (ref 9.2–12.9)
POCT GLUCOSE: 125 MG/DL (ref 70–110)
POCT GLUCOSE: 133 MG/DL (ref 70–110)
POCT GLUCOSE: 152 MG/DL (ref 70–110)
POCT GLUCOSE: 163 MG/DL (ref 70–110)
POTASSIUM SERPL-SCNC: 3.6 MMOL/L (ref 3.5–5.1)
RBC # BLD AUTO: 2.32 M/UL (ref 4–5.4)
SODIUM SERPL-SCNC: 134 MMOL/L (ref 136–145)
VANCOMYCIN SERPL-MCNC: 19.4 UG/ML
WBC # BLD AUTO: 17.11 K/UL (ref 3.9–12.7)

## 2022-01-01 PROCEDURE — 36415 COLL VENOUS BLD VENIPUNCTURE: CPT | Performed by: HOSPITALIST

## 2022-01-01 PROCEDURE — 63600175 PHARM REV CODE 636 W HCPCS: Performed by: NURSE PRACTITIONER

## 2022-01-01 PROCEDURE — 80202 ASSAY OF VANCOMYCIN: CPT | Performed by: HOSPITALIST

## 2022-01-01 PROCEDURE — 11000001 HC ACUTE MED/SURG PRIVATE ROOM

## 2022-01-01 PROCEDURE — 80069 RENAL FUNCTION PANEL: CPT | Performed by: STUDENT IN AN ORGANIZED HEALTH CARE EDUCATION/TRAINING PROGRAM

## 2022-01-01 PROCEDURE — 27000207 HC ISOLATION

## 2022-01-01 PROCEDURE — 25000003 PHARM REV CODE 250: Performed by: PSYCHIATRY & NEUROLOGY

## 2022-01-01 PROCEDURE — 25000003 PHARM REV CODE 250: Performed by: NURSE PRACTITIONER

## 2022-01-01 PROCEDURE — 85025 COMPLETE CBC W/AUTO DIFF WBC: CPT | Performed by: STUDENT IN AN ORGANIZED HEALTH CARE EDUCATION/TRAINING PROGRAM

## 2022-01-01 PROCEDURE — P9016 RBC LEUKOCYTES REDUCED: HCPCS | Performed by: NURSE PRACTITIONER

## 2022-01-01 PROCEDURE — 25000003 PHARM REV CODE 250: Performed by: SURGERY

## 2022-01-01 RX ORDER — HYDROCODONE BITARTRATE AND ACETAMINOPHEN 500; 5 MG/1; MG/1
TABLET ORAL
Status: DISCONTINUED | OUTPATIENT
Start: 2022-01-01 | End: 2022-01-10 | Stop reason: HOSPADM

## 2022-01-01 RX ADMIN — PREGABALIN 50 MG: 50 CAPSULE ORAL at 02:01

## 2022-01-01 RX ADMIN — INSULIN DETEMIR 5 UNITS: 100 INJECTION, SOLUTION SUBCUTANEOUS at 09:01

## 2022-01-01 RX ADMIN — PREGABALIN 50 MG: 50 CAPSULE ORAL at 08:01

## 2022-01-01 RX ADMIN — ALLOPURINOL 100 MG: 100 TABLET ORAL at 09:01

## 2022-01-01 RX ADMIN — MIRTAZAPINE 15 MG: 7.5 TABLET ORAL at 08:01

## 2022-01-01 RX ADMIN — OXYCODONE AND ACETAMINOPHEN 1 TABLET: 10; 325 TABLET ORAL at 03:01

## 2022-01-01 RX ADMIN — OXYCODONE AND ACETAMINOPHEN 1 TABLET: 10; 325 TABLET ORAL at 09:01

## 2022-01-01 RX ADMIN — SERTRALINE HYDROCHLORIDE 50 MG: 50 TABLET ORAL at 09:01

## 2022-01-01 RX ADMIN — PREGABALIN 50 MG: 50 CAPSULE ORAL at 09:01

## 2022-01-01 RX ADMIN — PIPERACILLIN AND TAZOBACTAM 4.5 G: 4; .5 INJECTION, POWDER, LYOPHILIZED, FOR SOLUTION INTRAVENOUS; PARENTERAL at 01:01

## 2022-01-01 RX ADMIN — SODIUM CHLORIDE, SODIUM LACTATE, POTASSIUM CHLORIDE, AND CALCIUM CHLORIDE 1000 ML: .6; .31; .03; .02 INJECTION, SOLUTION INTRAVENOUS at 02:01

## 2022-01-01 RX ADMIN — MORPHINE SULFATE 2 MG: 2 INJECTION, SOLUTION INTRAMUSCULAR; INTRAVENOUS at 03:01

## 2022-01-01 RX ADMIN — ATORVASTATIN CALCIUM 80 MG: 40 TABLET, FILM COATED ORAL at 08:01

## 2022-01-01 RX ADMIN — PIPERACILLIN AND TAZOBACTAM 4.5 G: 4; .5 INJECTION, POWDER, LYOPHILIZED, FOR SOLUTION INTRAVENOUS; PARENTERAL at 09:01

## 2022-01-01 NOTE — PROGRESS NOTES
[facility] Medicine  Telemedicine Progress Note    Patient Name: Suyapa Connelly  MRN: [id number]  Patient Class: IP- Inpatient   Admission Date: 12/21/2021  Length of Stay: 10 days  Attending Physician: Leslie Fallon MD  Primary Care Provider: Magen Christensen MD          Subjective:     Principal Problem:S/P BKA (below knee amputation) unilateral, right        HPI:  Suyaap Connelly is a 55 yo female with a pmh of DM2, PVD, osteomyelitis, right AKA, CKD3, CAD s/p CABG, afib on eliquis. She presented with right foot pain x 1 month. She has an ulcer to her right heel. Presented with dressing to right heel which was removed and had purulent foul smelling drainage noted. The dressing was not changed in 2 months since she last saw Dr. Askew on 10/22/21 for arthrectomy, thrombectomy, and PTA with DCB to right lower extremity. Had not gone to follow up appts. She now has what appears to be dry gangrene to right toes with bone exposure. She has not been compliant with medications and states she had not taken eliquis in over a month. She states she has not been able to stand due to right leg weakness since her left AKA in May 2021. She also has a pressure wound to the lateral right lower leg. She takes her 's percocet for pain, which was helping some.       Overview/Hospital Course:  No notes on file    Interval History: Seen virtually. No acute events overnight.Sitting up in chair after PT. Complaining about having both her legs cut off and that nobody cares. Encouraged to eat more. Drinking boost shakes.      Review of Systems   Constitutional: Negative for chills and fever.   Respiratory: Negative for cough and shortness of breath.    Cardiovascular: Negative for chest pain and leg swelling.   Gastrointestinal: Negative for abdominal pain and nausea.   Genitourinary: Negative for difficulty urinating.   Musculoskeletal: Positive for arthralgias and myalgias.        Right leg pain   Skin:  Positive for wound.   Neurological: Positive for weakness.   Psychiatric/Behavioral: Positive for dysphoric mood.        Depressed     Objective:     Vital Signs (Most Recent):  Temp: 98.7 °F (37.1 °C) (12/31/21 1543)  Pulse: 85 (12/31/21 1600)  Resp: 16 (12/31/21 1543)  BP: (!) 102/59 (12/31/21 1543)  SpO2: 98 % (12/31/21 1543) Vital Signs (24h Range):  Temp:  [98.5 °F (36.9 °C)-99.8 °F (37.7 °C)] 98.7 °F (37.1 °C)  Pulse:  [] 85  Resp:  [16-18] 16  SpO2:  [95 %-99 %] 98 %  BP: (102-135)/(55-80) 102/59     Weight: 79.2 kg (174 lb 9.7 oz)  Body mass index is 29.06 kg/m².  No intake or output data in the 24 hours ending 12/31/21 2035   Physical Exam  Vitals and nursing note reviewed.   Constitutional:       General: She is not in acute distress.     Appearance: She is obese.   Cardiovascular:      Rate and Rhythm: Normal rate.   Pulmonary:      Effort: Pulmonary effort is normal. No respiratory distress.   Musculoskeletal:      Cervical back: Normal range of motion.      Comments: Bilateral leg amputations   Skin:     General: Skin is dry.      Comments: Right leg BKA   Neurological:      Mental Status: She is alert and oriented to person, place, and time.      Motor: Weakness present.   Psychiatric:         Behavior: Behavior normal.         Thought Content: Thought content normal.      Comments: Depressed about losing leg         Significant Labs:   All pertinent labs within the past 24 hours have been reviewed.  Blood Culture:   Recent Labs   Lab 12/30/21  1412   LABBLOO No Growth to date  No Growth to date     BMP:   Recent Labs   Lab 12/31/21  0548   *   *   K 3.8      CO2 20*   BUN 33*   CREATININE 1.5*   CALCIUM 7.6*   MG 1.9     CBC:   Recent Labs   Lab 12/30/21  0816 12/31/21  0548   WBC 17.84* 19.70*   HGB 7.5* 7.5*   HCT 24.9* 24.5*   * 569*     CMP:   Recent Labs   Lab 12/30/21  0816 12/31/21  0548    135*   K 3.1* 3.8    106   CO2 19* 20*   GLU 82 111*   BUN  22* 33*   CREATININE 1.4 1.5*   CALCIUM 8.3* 7.6*   ANIONGAP 12 9   EGFRNONAA 42* 39*     Lactic Acid:   Recent Labs   Lab 12/30/21  1412   LACTATE 1.0       Significant Imaging: I have reviewed all pertinent imaging results/findings within the past 24 hours.      Assessment/Plan:      * S/P BKA (below knee amputation) unilateral, right  Gangrene  Osteomyelitis  Diabetic Foot ulcer  PVD    - Dry gangrene right foot, osteo on MRI on presentation  - started on vanc, flagyl, and cefepime  - eliquis held  - ID, cardiology, and general surgery consulted  - BKA on 12/28  - antibiotics stopped post BKA per ID recs  - pain meds adjusted  - nutritional supplements added for wound healing  - PT/OT consulted  - discussed with general surgery, reports wound looks good post operatively  - restart vanc and zosyn   - lactic and blood cultures ordered--NGTD  - WBC uptrending  - IPR if remains stable       Osteomyelitis of right foot  See s/p BKA      Diabetic ulcer of right foot associated with type 2 diabetes mellitus  See s/p BKA      Cellulitis  See S/p BKA        Stage 3 chronic kidney disease due to type 2 diabetes mellitus  - At baseline  - Renally dose meds      Chronic diastolic heart failure    - Cont lasix daily  - denies SOB on room air  - no hypoxia noted      Chronic anemia  - Hgb drop from 13 to 7 over 2 month period  - Type and screen  - Hold plavix and eliquis (had not been taking)  - Denies acute blood loss  - s/p 1 U PRBC 12/23  -type and screen, post op BKA  -hgb stable post operatively      Hypokalemia  replete      Critical lower limb ischemia  - Had intervention to RLE in October  - patient non-compliant with medications, wound care, and follow up appts  - Persistent non-healing diabetic right foot wounds   - Arterial US showing stenosis   - Cardiology following  - BKA on 12/28/21              Paroxysmal atrial fibrillation  - NSR on tele  - Noncompliant with eliquis  - Cont to hold for now s/p RLE  amptutation      Type 2 diabetes mellitus with hyperglycemia, with long-term current use of insulin  - Hold home meds  - Detemir 5u nightly  - Accuchecks and ISS  - A1C 9.6        MDD (major depressive disorder), recurrent episode, moderate  Expresses depression and anger about losing both of her legs this year despite being compliant and doing everything asked of her. (She presented with gangrenous foot after not changing her wound dressing, not attending any follow up appts, and not taking medications for months)  Consult psych for eval, Appreciate recs  increase remeron to 15mg at night  Start zoloft 50mg daily  D/c lexapro      Essential hypertension  - stop amlodipine 5mg daily for now due to soft blood pressures, resume as able  - Monitor pressures      Gangrene  See S/p right BKA          VTE Risk Mitigation (From admission, onward)         Ordered     Reason for No Pharmacological VTE Prophylaxis  Once        Question:  Reasons:  Answer:  Already adequately anticoagulated on oral Anticoagulants    12/21/21 1910     IP VTE HIGH RISK PATIENT  Once         12/21/21 1910                      I have assessed these finding virtually using telemed platform and with assistance of bedside nurse                 The attending portion of this evaluation, treatment, and documentation was performed per Petra De Anda NP via Telemedicine AudioVisual using the secure [employer] software platform with 2 way audio/video. The provider was located off-site and the patient is located in the hospital. The aforementioned video software was utilized to document the relevant history and physical exam    Petra De Anda NP  Department of Hospital Medicine   [place] - [facility]

## 2022-01-01 NOTE — ASSESSMENT & PLAN NOTE
Gangrene  Osteomyelitis  Diabetic Foot ulcer  PVD    - Dry gangrene right foot, osteo on MRI on presentation  - started on vanc, flagyl, and cefepime  - eliquis held  - ID, cardiology, and general surgery consulted  - BKA on 12/28  - antibiotics stopped post BKA per ID recs  - pain meds adjusted  - nutritional supplements added for wound healing  - PT/OT consulted  - discussed with general surgery, reports wound looks good post operatively  - restart vanc and zosyn   - lactic and blood cultures ordered--NGTD  - WBC uptrending  - IPR if remains stable

## 2022-01-01 NOTE — SUBJECTIVE & OBJECTIVE
Interval History: Seen virtually. No acute events overnight.Sitting up in chair after PT. Complaining about having both her legs cut off and that nobody cares. Encouraged to eat more. Drinking boost shakes.      Review of Systems   Constitutional: Negative for chills and fever.   Respiratory: Negative for cough and shortness of breath.    Cardiovascular: Negative for chest pain and leg swelling.   Gastrointestinal: Negative for abdominal pain and nausea.   Genitourinary: Negative for difficulty urinating.   Musculoskeletal: Positive for arthralgias and myalgias.        Right leg pain   Skin: Positive for wound.   Neurological: Positive for weakness.   Psychiatric/Behavioral: Positive for dysphoric mood.        Depressed     Objective:     Vital Signs (Most Recent):  Temp: 98.7 °F (37.1 °C) (12/31/21 1543)  Pulse: 85 (12/31/21 1600)  Resp: 16 (12/31/21 1543)  BP: (!) 102/59 (12/31/21 1543)  SpO2: 98 % (12/31/21 1543) Vital Signs (24h Range):  Temp:  [98.5 °F (36.9 °C)-99.8 °F (37.7 °C)] 98.7 °F (37.1 °C)  Pulse:  [] 85  Resp:  [16-18] 16  SpO2:  [95 %-99 %] 98 %  BP: (102-135)/(55-80) 102/59     Weight: 79.2 kg (174 lb 9.7 oz)  Body mass index is 29.06 kg/m².  No intake or output data in the 24 hours ending 12/31/21 2035   Physical Exam  Vitals and nursing note reviewed.   Constitutional:       General: She is not in acute distress.     Appearance: She is obese.   Cardiovascular:      Rate and Rhythm: Normal rate.   Pulmonary:      Effort: Pulmonary effort is normal. No respiratory distress.   Musculoskeletal:      Cervical back: Normal range of motion.      Comments: Bilateral leg amputations   Skin:     General: Skin is dry.      Comments: Right leg BKA   Neurological:      Mental Status: She is alert and oriented to person, place, and time.      Motor: Weakness present.   Psychiatric:         Behavior: Behavior normal.         Thought Content: Thought content normal.      Comments: Depressed about losing leg          Significant Labs:   All pertinent labs within the past 24 hours have been reviewed.  Blood Culture:   Recent Labs   Lab 12/30/21  1412   LABBLOO No Growth to date  No Growth to date     BMP:   Recent Labs   Lab 12/31/21  0548   *   *   K 3.8      CO2 20*   BUN 33*   CREATININE 1.5*   CALCIUM 7.6*   MG 1.9     CBC:   Recent Labs   Lab 12/30/21  0816 12/31/21  0548   WBC 17.84* 19.70*   HGB 7.5* 7.5*   HCT 24.9* 24.5*   * 569*     CMP:   Recent Labs   Lab 12/30/21  0816 12/31/21  0548    135*   K 3.1* 3.8    106   CO2 19* 20*   GLU 82 111*   BUN 22* 33*   CREATININE 1.4 1.5*   CALCIUM 8.3* 7.6*   ANIONGAP 12 9   EGFRNONAA 42* 39*     Lactic Acid:   Recent Labs   Lab 12/30/21  1412   LACTATE 1.0       Significant Imaging: I have reviewed all pertinent imaging results/findings within the past 24 hours.

## 2022-01-01 NOTE — CARE UPDATE
Blood Product Patient Consent    I, Petra De Anda, have reviewed with the patient for medical decision maker the risks and benefits of receiving blood products in detail.These risks and benefits, as well as alternatives are discussed below, and patient was consented appropriately prior to receiving any blood products.    RISKS-  Risks of receiving blood include but are not limited to; fever, heart failure, incompatibility of blood resulting in a transfusion reaction which can further cause kidney failure or anemia either currently or after future transfusions, transmission of infectious diseases including hepatitis B, hepatitis C, HIV and other infections. It is not possible to test for all transmitted diseases at this time, and in spite of exercise of due care, there is possibility of some ill effects.    Additional risks have been explained to the patient as indicated here n/a    BENEFITS-  Benefits of blood transfusion can be life saving, and blood transfusion cannot be replaced with other therapies.  Blood transfusion may be needed to replace blood oxygen transfer or to replace clotting factors.    The medical reasoning for blood transfusion has been explained to the patient or healthcare decision maker as indicated here anemia    ALTERNATE TREATMENT:  Alternatives to transfusion include donating her own blood, providing your own donors, and blood solids procedures.  Donation of your own blood and donation of donors you select takes much time and cannot be done on an emergency basis.  Blood salvage procedures refer to the collecting blood loss during surgery and returning it to the patient.  This can only be done in limited, well-defined circumstances, and is not available to all patient's for this region.    By entering this consent into the legal medical record, I authorize that the risk, benefits, and alternatives to blood transfusion have been explained to the patient.  I also authorize that factors  bearing on the decision whether to authorize transfusion or blood have been appropriately explained to the patient and the patient and/or medical decision maker has been given appropriate opportunity to select alternative therapies should that be medically appropriate. The patient and/or medical decision maker have been given an appropriate opportunity to ask questions, and all questions that they ask have been answered to the patient's full satisfaction.  I authorize that the patient or medical decision maker consents to such transfusion as indicated above.    Patient representative for whom consent was obtained (if not the patient)- n/a    Patient for whom the treatment is to be given- Suyapa Connelly    Date and time that consent was obtained-, 1/1/2022, 2:30 PM    Witness who was present at the time of consent- Allison Dixon RN    Physician or provider who provided consent- Petra De Anda

## 2022-01-01 NOTE — PROGRESS NOTES
"Surgery follow up  BP (!) 100/49 (BP Location: Left arm, Patient Position: Lying)   Pulse 87   Temp 98 °F (36.7 °C) (Oral)   Resp 18   Ht 5' 5" (1.651 m)   Wt 79.2 kg (174 lb 9.7 oz)   LMP 09/30/2015 (Exact Date)   SpO2 99%   BMI 29.06 kg/m²   I/O last 3 completed shifts:  In: 200 [IV Piggyback:200]  Out: 350 [Urine:350]  No intake/output data recorded.  Recent Results (from the past 336 hour(s))   CBC Auto Differential    Collection Time: 01/01/22  6:53 AM   Result Value Ref Range    WBC 17.11 (H) 3.90 - 12.70 K/uL    Hemoglobin 7.0 (L) 12.0 - 16.0 g/dL    Hematocrit 23.1 (L) 37.0 - 48.5 %    Platelets 532 (H) 150 - 450 K/uL   CBC Auto Differential    Collection Time: 12/31/21  5:48 AM   Result Value Ref Range    WBC 19.70 (H) 3.90 - 12.70 K/uL    Hemoglobin 7.5 (L) 12.0 - 16.0 g/dL    Hematocrit 24.5 (L) 37.0 - 48.5 %    Platelets 569 (H) 150 - 450 K/uL   CBC auto differential    Collection Time: 12/30/21  8:16 AM   Result Value Ref Range    WBC 17.84 (H) 3.90 - 12.70 K/uL    Hemoglobin 7.5 (L) 12.0 - 16.0 g/dL    Hematocrit 24.9 (L) 37.0 - 48.5 %    Platelets 593 (H) 150 - 450 K/uL     Dressing chnaged  wound getting better , no drainage , continue present treatment with iv antibiotics.  "

## 2022-01-01 NOTE — SUBJECTIVE & OBJECTIVE
Interval History: Patient hypotensive with ROSLYN today, lasix stopped and IVF given. Hb of 7.0 no s/s of bleeding, given 1U PRBC. Patient still with poor appetite, states she is not eating/drinking much. No issues with urination.     Review of Systems   Constitutional: Negative for chills and fever.   Respiratory: Negative for cough and shortness of breath.    Cardiovascular: Negative for chest pain and leg swelling.   Gastrointestinal: Negative for abdominal pain and nausea.   Genitourinary: Negative for difficulty urinating.   Musculoskeletal: Positive for arthralgias and myalgias.        Right leg pain   Skin: Positive for wound.   Neurological: Positive for weakness.   Psychiatric/Behavioral: Positive for dysphoric mood.        Depressed     Objective:     Vital Signs (Most Recent):  Temp: 98.7 °F (37.1 °C) (01/01/22 1600)  Pulse: 95 (01/01/22 1600)  Resp: 18 (01/01/22 1600)  BP: 118/62 (01/01/22 1600)  SpO2: 100 % (01/01/22 1600) Vital Signs (24h Range):  Temp:  [96.6 °F (35.9 °C)-99.3 °F (37.4 °C)] 98.7 °F (37.1 °C)  Pulse:  [] 95  Resp:  [17-18] 18  SpO2:  [98 %-100 %] 100 %  BP: ()/(46-62) 118/62     Weight: 79.2 kg (174 lb 9.7 oz)  Body mass index is 29.06 kg/m².    Intake/Output Summary (Last 24 hours) at 1/1/2022 1632  Last data filed at 1/1/2022 0513  Gross per 24 hour   Intake 200 ml   Output 350 ml   Net -150 ml      Physical Exam  Vitals and nursing note reviewed.   Constitutional:       General: She is not in acute distress.     Appearance: She is obese.   Cardiovascular:      Rate and Rhythm: Normal rate.   Pulmonary:      Effort: Pulmonary effort is normal. No respiratory distress.   Musculoskeletal:      Cervical back: Normal range of motion.      Comments: Bilateral leg amputations   Skin:     General: Skin is dry.      Comments: Right leg BKA   Neurological:      Mental Status: She is alert and oriented to person, place, and time.      Motor: Weakness present.   Psychiatric:          Behavior: Behavior normal.         Thought Content: Thought content normal.      Comments: Depressed about losing leg         Significant Labs:   All pertinent labs within the past 24 hours have been reviewed.  BMP:   Recent Labs   Lab 12/31/21 0548 12/31/21 0548 01/01/22  0653   *   < > 103   *   < > 134*   K 3.8   < > 3.6      < > 104   CO2 20*   < > 21*   BUN 33*   < > 41*   CREATININE 1.5*   < > 1.8*   CALCIUM 7.6*   < > 7.5*   MG 1.9  --   --     < > = values in this interval not displayed.     CBC:   Recent Labs   Lab 12/31/21 0548 01/01/22  0653   WBC 19.70* 17.11*   HGB 7.5* 7.0*   HCT 24.5* 23.1*   * 532*       Significant Imaging: I have reviewed all pertinent imaging results/findings within the past 24 hours.

## 2022-01-01 NOTE — PROGRESS NOTES
Pharmacokinetic Assessment Follow Up: IV Vancomycin    Vancomycin serum concentration assessment(s):    The random level was drawn correctly and can be used to guide therapy at this time. The measurement is within the desired definitive target range of 15 to 20 mcg/mL.    Vancomycin Regimen Plan:    Continue Vancomycin pulse dosing regimen with the next serum random concentration measured at 0400 on 1-2-22      Drug levels (last 3 results):  Recent Labs   Lab Result Units 12/30/21  0816 12/31/21  0548 01/01/22  0653   Vancomycin, Random ug/mL  --  14.8 19.4   Vancomycin-Trough ug/mL 22.1*  --   --        Pharmacy will continue to follow and monitor vancomycin.    Please contact pharmacy at extension [phone] for questions regarding this assessment.    Thank you for the consult,   Teddy Wilkinson       Patient brief summary:  Suyapa Connelly is a 56 y.o. female initiated on antimicrobial therapy with IV Vancomycin for treatment of bone/joint infection    The patient's current regimen is Vancomycin pulse dosing    Drug Allergies:   Review of patient's allergies indicates:   Allergen Reactions    Ciprofloxacin Itching    Contrast media      Kidney injury    Iodine      Kidney injury       Actual Body Weight:   79.2 kg    Renal Function:   Estimated Creatinine Clearance: 36.7 mL/min (A) (based on SCr of 1.8 mg/dL (H)).,     Dialysis Method (if applicable):  N/A    CBC (last 72 hours):  Recent Labs   Lab Result Units 12/30/21  0816 12/31/21  0548 01/01/22  0653   WBC K/uL 17.84* 19.70* 17.11*   Hemoglobin g/dL 7.5* 7.5* 7.0*   Hematocrit % 24.9* 24.5* 23.1*   Platelets K/uL 593* 569* 532*   Gran % % 80.0* 78.5* 78.1*   Lymph % % 8.6* 9.1* 9.2*   Mono % % 10.0 10.8 10.3   Eosinophil % % 0.5 0.5 1.3   Basophil % % 0.3 0.4 0.5   Differential Method  Automated Automated Automated       Metabolic Panel (last 72 hours):  Recent Labs   Lab Result Units 12/30/21  0816 12/31/21  0548 01/01/22  0653   Sodium mmol/L 136 135* 134*    Potassium mmol/L 3.1* 3.8 3.6   Chloride mmol/L 105 106 104   CO2 mmol/L 19* 20* 21*   Glucose mg/dL 82 111* 103   BUN mg/dL 22* 33* 41*   Creatinine mg/dL 1.4 1.5* 1.8*   Albumin g/dL  --   --  1.1*   Magnesium mg/dL  --  1.9  --    Phosphorus mg/dL  --   --  3.2       Vancomycin Administrations:  vancomycin given in the last 96 hours                     vancomycin 500 mg in dextrose 5 % 100 mL IVPB (ready to mix system) (mg) 500 mg New Bag 12/31/21 0819    vancomycin 500 mg in dextrose 5 % 100 mL IVPB (ready to mix system) (mg) 500 mg New Bag 12/29/21 1123     500 mg New Bag 12/28/21 1120    vancomycin injection (g) 1 g Given 12/28/21 1927                    Microbiologic Results:  Microbiology Results (last 7 days)       Procedure Component Value Units Date/Time    Blood culture [[id number]] Collected: 12/30/21 1412    Order Status: Completed Specimen: Blood from Peripheral, Right Arm Updated: 12/31/21 2212     Blood Culture, Routine No Growth to date      No Growth to date    Blood culture [[id number]] Collected: 12/30/21 1412    Order Status: Completed Specimen: Blood from Peripheral, Right Hand Updated: 12/31/21 2212     Blood Culture, Routine No Growth to date      No Growth to date

## 2022-01-01 NOTE — ASSESSMENT & PLAN NOTE
- stop amlodipine 5mg daily for now due to soft blood pressures, resume as able  - Monitor pressures

## 2022-01-01 NOTE — ASSESSMENT & PLAN NOTE
- Hgb drop from 13 to 7 over 2 month period  - Type and screen  - Hold plavix and eliquis (had not been taking)  - Denies acute blood loss  - s/p 1 U PRBC 12/23  -type and screen, post op BKA  -hgb stable post operatively

## 2022-01-01 NOTE — ASSESSMENT & PLAN NOTE
- Hgb drop from 13 to 7 over 2 month period  - Type and screen  - Hold plavix and eliquis (had not been taking)  - Denies acute blood loss  - s/p 1 U PRBC 12/23 and again 1/1

## 2022-01-01 NOTE — PROGRESS NOTES
[facility] Medicine  Telemedicine Progress Note    Patient Name: Suyapa Connelly  MRN: [id number]  Patient Class: IP- Inpatient   Admission Date: 12/21/2021  Length of Stay: 11 days  Attending Physician: Ruben Flood MD  Primary Care Provider: Magen Christensen MD          Subjective:     Principal Problem:S/P BKA (below knee amputation) unilateral, right        HPI:  Suyapa Connelly is a 55 yo female with a pmh of DM2, PVD, osteomyelitis, right AKA, CKD3, CAD s/p CABG, afib on eliquis. She presented with right foot pain x 1 month. She has an ulcer to her right heel. Presented with dressing to right heel which was removed and had purulent foul smelling drainage noted. The dressing was not changed in 2 months since she last saw Dr. Askew on 10/22/21 for arthrectomy, thrombectomy, and PTA with DCB to right lower extremity. Had not gone to follow up appts. She now has what appears to be dry gangrene to right toes with bone exposure. She has not been compliant with medications and states she had not taken eliquis in over a month. She states she has not been able to stand due to right leg weakness since her left AKA in May 2021. She also has a pressure wound to the lateral right lower leg. She takes her 's percocet for pain, which was helping some.       Overview/Hospital Course:  Patient admitted with osteomyelitis of RLE and gangrene, ID and podiatry consulted and patient was started on IV vancomycin and zosyn. Podiatry recommended general surgery consult for BKA, which was done. Patient continued to have leukocytosis so remains on IV antibiotics. PT/OT recommending rehab.       Interval History: Patient hypotensive with ROSLYN today, lasix stopped and IVF given. Hb of 7.0 no s/s of bleeding, given 1U PRBC. Patient still with poor appetite, states she is not eating/drinking much. No issues with urination.     Review of Systems   Constitutional: Negative for chills and fever.    Respiratory: Negative for cough and shortness of breath.    Cardiovascular: Negative for chest pain and leg swelling.   Gastrointestinal: Negative for abdominal pain and nausea.   Genitourinary: Negative for difficulty urinating.   Musculoskeletal: Positive for arthralgias and myalgias.        Right leg pain   Skin: Positive for wound.   Neurological: Positive for weakness.   Psychiatric/Behavioral: Positive for dysphoric mood.        Depressed     Objective:     Vital Signs (Most Recent):  Temp: 98.7 °F (37.1 °C) (01/01/22 1600)  Pulse: 95 (01/01/22 1600)  Resp: 18 (01/01/22 1600)  BP: 118/62 (01/01/22 1600)  SpO2: 100 % (01/01/22 1600) Vital Signs (24h Range):  Temp:  [96.6 °F (35.9 °C)-99.3 °F (37.4 °C)] 98.7 °F (37.1 °C)  Pulse:  [] 95  Resp:  [17-18] 18  SpO2:  [98 %-100 %] 100 %  BP: ()/(46-62) 118/62     Weight: 79.2 kg (174 lb 9.7 oz)  Body mass index is 29.06 kg/m².    Intake/Output Summary (Last 24 hours) at 1/1/2022 1632  Last data filed at 1/1/2022 0513  Gross per 24 hour   Intake 200 ml   Output 350 ml   Net -150 ml      Physical Exam  Vitals and nursing note reviewed.   Constitutional:       General: She is not in acute distress.     Appearance: She is obese.   Cardiovascular:      Rate and Rhythm: Normal rate.   Pulmonary:      Effort: Pulmonary effort is normal. No respiratory distress.   Musculoskeletal:      Cervical back: Normal range of motion.      Comments: Bilateral leg amputations   Skin:     General: Skin is dry.      Comments: Right leg BKA   Neurological:      Mental Status: She is alert and oriented to person, place, and time.      Motor: Weakness present.   Psychiatric:         Behavior: Behavior normal.         Thought Content: Thought content normal.      Comments: Depressed about losing leg         Significant Labs:   All pertinent labs within the past 24 hours have been reviewed.  BMP:   Recent Labs   Lab 12/31/21  0548 12/31/21  0548 01/01/22  0653   *   < > 103    *   < > 134*   K 3.8   < > 3.6      < > 104   CO2 20*   < > 21*   BUN 33*   < > 41*   CREATININE 1.5*   < > 1.8*   CALCIUM 7.6*   < > 7.5*   MG 1.9  --   --     < > = values in this interval not displayed.     CBC:   Recent Labs   Lab 12/31/21  0548 01/01/22  0653   WBC 19.70* 17.11*   HGB 7.5* 7.0*   HCT 24.5* 23.1*   * 532*       Significant Imaging: I have reviewed all pertinent imaging results/findings within the past 24 hours.      Assessment/Plan:      * S/P BKA (below knee amputation) unilateral, right  Gangrene  Osteomyelitis  Diabetic Foot ulcer  PVD    - Dry gangrene right foot, osteo on MRI on presentation  - started on vanc, flagyl, and cefepime  - eliquis held  - ID, cardiology, and general surgery consulted  - BKA on 12/28  - antibiotics stopped post BKA per ID recs  - pain meds adjusted  - nutritional supplements added for wound healing  - PT/OT consulted  - discussed with general surgery, reports wound looks good post operatively  - restart vanc and zosyn   - lactic and blood cultures ordered--NGTD  - WBC uptrending  - IPR if remains stable       Osteomyelitis of right foot  See s/p BKA      Diabetic ulcer of right foot associated with type 2 diabetes mellitus  See s/p BKA      Cellulitis  See S/p BKA        Stage 3 chronic kidney disease due to type 2 diabetes mellitus  - slightly worsening, U/A and urine lytes ordered  - Renally dose meds      Chronic diastolic heart failure  - hold lasix due to hypotension  - denies SOB on room air  - no hypoxia noted      Chronic anemia  - Hgb drop from 13 to 7 over 2 month period  - Type and screen  - Hold plavix and eliquis (had not been taking)  - Denies acute blood loss  - s/p 1 U PRBC 12/23 and again 1/1      Hypokalemia  replete      Critical lower limb ischemia  - Had intervention to RLE in October  - patient non-compliant with medications, wound care, and follow up appts  - Persistent non-healing diabetic right foot wounds   -  Arterial US showing stenosis   - Cardiology following  - BKA on 12/28/21              Paroxysmal atrial fibrillation  - NSR on tele  - Noncompliant with eliquis  - Cont to hold for now s/p RLE amptutation      Type 2 diabetes mellitus with hyperglycemia, with long-term current use of insulin  - Hold home meds  - Detemir 5u nightly  - Accuchecks and ISS  - A1C 9.6        MDD (major depressive disorder), recurrent episode, moderate  Expresses depression and anger about losing both of her legs this year despite being compliant and doing everything asked of her. (She presented with gangrenous foot after not changing her wound dressing, not attending any follow up appts, and not taking medications for months)  Consult psych for eval, Appreciate recs  increase remeron to 15mg at night  Start zoloft 50mg daily  D/c lexapro      Essential hypertension  - stop amlodipine 5mg daily for now due to soft blood pressures, resume as able  - Monitor pressures  - given IVF on 1/1 due to soft BP      Gangrene  See S/p right BKA          VTE Risk Mitigation (From admission, onward)         Ordered     Reason for No Pharmacological VTE Prophylaxis  Once        Question:  Reasons:  Answer:  Already adequately anticoagulated on oral Anticoagulants    12/21/21 1910     IP VTE HIGH RISK PATIENT  Once         12/21/21 1910                      I have assessed these finding virtually using telemed platform and with assistance of bedside nurse       The attending portion of this evaluation, treatment, and documentation was performed per Ruben Flood MD via Telemedicine AudioVisual using the secure [employer] software platform with 2 way audio/video. The provider was located off-site and the patient is located in the hospital. The aforementioned video software was utilized to document the relevant history and physical exam    Ruben Flood MD  Department of Hospital Medicine   [place] [facility], later [facility]

## 2022-01-01 NOTE — ASSESSMENT & PLAN NOTE
- stop amlodipine 5mg daily for now due to soft blood pressures, resume as able  - Monitor pressures  - given IVF on 1/1 due to soft BP

## 2022-01-01 NOTE — PLAN OF CARE
Problem: Adult Inpatient Plan of Care  Goal: Plan of Care Review  Outcome: Ongoing, Progressing   Patient is alert, oriented X4. Care plan explained to patient, she verbalized understanding.     On 2L of nasal cannula, O2 saturation maintain 97%, no respiratory distress noted. On cardiac monitor, running normal sinus rhythm.    Deny nausea/vomiting/diarrhea. Complaint pain on right leg. PRN morphine and percocet given. Due medication given. Wound dressing change done.     Maintain fall risk precaution, bed in lowest position, bed alarm on. Call light/personal items in reach. Purewick in place. Instructed patient call for help as needed. Will continue to monitor.

## 2022-01-01 NOTE — HOSPITAL COURSE
Patient admitted with osteomyelitis of RLE and gangrene, ID and podiatry consulted and patient was started on IV vancomycin and zosyn. Podiatry recommended general surgery consult for BKA, which was done. Patient continued to have leukocytosis so remains on IV antibiotics. PT/OT recommending rehab. ID re-consulted on 1/2 due to persistent leukocytosis and repeat blood cultures, CXR, and U/A ordered. Continuing IV zosyn because patient is allergic to ciprofloxacin - appreciate ID recs.De-escalated to PO augmentin on 1/3/22 and did well, meds adjusted to better neuropathic pain control (increased lyrica). Wound care and surgery followed for wound care recommendations and patient was discharged to rehab in stable condition.

## 2022-01-02 LAB
ALBUMIN SERPL BCP-MCNC: 1.2 G/DL (ref 3.5–5.2)
ANION GAP SERPL CALC-SCNC: 10 MMOL/L (ref 8–16)
BASOPHILS # BLD AUTO: 0.09 K/UL (ref 0–0.2)
BASOPHILS NFR BLD: 0.5 % (ref 0–1.9)
BUN SERPL-MCNC: 49 MG/DL (ref 6–20)
CALCIUM SERPL-MCNC: 7.8 MG/DL (ref 8.7–10.5)
CHLORIDE SERPL-SCNC: 104 MMOL/L (ref 95–110)
CO2 SERPL-SCNC: 20 MMOL/L (ref 23–29)
CREAT SERPL-MCNC: 1.7 MG/DL (ref 0.5–1.4)
DIFFERENTIAL METHOD: ABNORMAL
EOSINOPHIL # BLD AUTO: 0.2 K/UL (ref 0–0.5)
EOSINOPHIL NFR BLD: 1.3 % (ref 0–8)
ERYTHROCYTE [DISTWIDTH] IN BLOOD BY AUTOMATED COUNT: 14.5 % (ref 11.5–14.5)
EST. GFR  (AFRICAN AMERICAN): 39 ML/MIN/1.73 M^2
EST. GFR  (NON AFRICAN AMERICAN): 33 ML/MIN/1.73 M^2
GLUCOSE SERPL-MCNC: 80 MG/DL (ref 70–110)
HCT VFR BLD AUTO: 26 % (ref 37–48.5)
HGB BLD-MCNC: 8.1 G/DL (ref 12–16)
IMM GRANULOCYTES # BLD AUTO: 0.14 K/UL (ref 0–0.04)
IMM GRANULOCYTES NFR BLD AUTO: 0.8 % (ref 0–0.5)
LYMPHOCYTES # BLD AUTO: 1.6 K/UL (ref 1–4.8)
LYMPHOCYTES NFR BLD: 8.9 % (ref 18–48)
MCH RBC QN AUTO: 30.2 PG (ref 27–31)
MCHC RBC AUTO-ENTMCNC: 31.2 G/DL (ref 32–36)
MCV RBC AUTO: 97 FL (ref 82–98)
MONOCYTES # BLD AUTO: 1.8 K/UL (ref 0.3–1)
MONOCYTES NFR BLD: 9.8 % (ref 4–15)
NEUTROPHILS # BLD AUTO: 14.1 K/UL (ref 1.8–7.7)
NEUTROPHILS NFR BLD: 78.7 % (ref 38–73)
NRBC BLD-RTO: 0 /100 WBC
PHOSPHATE SERPL-MCNC: 3.3 MG/DL (ref 2.7–4.5)
PLATELET # BLD AUTO: 575 K/UL (ref 150–450)
PMV BLD AUTO: 10.6 FL (ref 9.2–12.9)
POCT GLUCOSE: 108 MG/DL (ref 70–110)
POCT GLUCOSE: 151 MG/DL (ref 70–110)
POCT GLUCOSE: 209 MG/DL (ref 70–110)
POCT GLUCOSE: 221 MG/DL (ref 70–110)
POTASSIUM SERPL-SCNC: 3.7 MMOL/L (ref 3.5–5.1)
RBC # BLD AUTO: 2.68 M/UL (ref 4–5.4)
SODIUM SERPL-SCNC: 134 MMOL/L (ref 136–145)
VANCOMYCIN SERPL-MCNC: 15.2 UG/ML
WBC # BLD AUTO: 17.96 K/UL (ref 3.9–12.7)

## 2022-01-02 PROCEDURE — 87040 BLOOD CULTURE FOR BACTERIA: CPT | Performed by: STUDENT IN AN ORGANIZED HEALTH CARE EDUCATION/TRAINING PROGRAM

## 2022-01-02 PROCEDURE — 27000207 HC ISOLATION

## 2022-01-02 PROCEDURE — 11000001 HC ACUTE MED/SURG PRIVATE ROOM

## 2022-01-02 PROCEDURE — 36415 COLL VENOUS BLD VENIPUNCTURE: CPT | Performed by: STUDENT IN AN ORGANIZED HEALTH CARE EDUCATION/TRAINING PROGRAM

## 2022-01-02 PROCEDURE — 63600175 PHARM REV CODE 636 W HCPCS: Performed by: NURSE PRACTITIONER

## 2022-01-02 PROCEDURE — 25000003 PHARM REV CODE 250: Performed by: PSYCHIATRY & NEUROLOGY

## 2022-01-02 PROCEDURE — 80202 ASSAY OF VANCOMYCIN: CPT | Performed by: STUDENT IN AN ORGANIZED HEALTH CARE EDUCATION/TRAINING PROGRAM

## 2022-01-02 PROCEDURE — 80069 RENAL FUNCTION PANEL: CPT | Performed by: STUDENT IN AN ORGANIZED HEALTH CARE EDUCATION/TRAINING PROGRAM

## 2022-01-02 PROCEDURE — 25000003 PHARM REV CODE 250: Performed by: NURSE PRACTITIONER

## 2022-01-02 PROCEDURE — 25000003 PHARM REV CODE 250: Performed by: SURGERY

## 2022-01-02 PROCEDURE — 63600175 PHARM REV CODE 636 W HCPCS: Performed by: STUDENT IN AN ORGANIZED HEALTH CARE EDUCATION/TRAINING PROGRAM

## 2022-01-02 PROCEDURE — 85025 COMPLETE CBC W/AUTO DIFF WBC: CPT | Performed by: STUDENT IN AN ORGANIZED HEALTH CARE EDUCATION/TRAINING PROGRAM

## 2022-01-02 PROCEDURE — 36430 TRANSFUSION BLD/BLD COMPNT: CPT

## 2022-01-02 PROCEDURE — 25000003 PHARM REV CODE 250: Performed by: STUDENT IN AN ORGANIZED HEALTH CARE EDUCATION/TRAINING PROGRAM

## 2022-01-02 RX ORDER — CIPROFLOXACIN 500 MG/1
500 TABLET ORAL EVERY 12 HOURS
Status: DISCONTINUED | OUTPATIENT
Start: 2022-01-02 | End: 2022-01-02

## 2022-01-02 RX ADMIN — SERTRALINE HYDROCHLORIDE 50 MG: 50 TABLET ORAL at 08:01

## 2022-01-02 RX ADMIN — ALLOPURINOL 100 MG: 100 TABLET ORAL at 08:01

## 2022-01-02 RX ADMIN — POLYETHYLENE GLYCOL 3350 17 G: 17 POWDER, FOR SOLUTION ORAL at 08:01

## 2022-01-02 RX ADMIN — MORPHINE SULFATE 2 MG: 2 INJECTION, SOLUTION INTRAMUSCULAR; INTRAVENOUS at 04:01

## 2022-01-02 RX ADMIN — INSULIN ASPART 1 UNITS: 100 INJECTION, SOLUTION INTRAVENOUS; SUBCUTANEOUS at 08:01

## 2022-01-02 RX ADMIN — OXYCODONE AND ACETAMINOPHEN 1 TABLET: 10; 325 TABLET ORAL at 05:01

## 2022-01-02 RX ADMIN — VANCOMYCIN HYDROCHLORIDE 500 MG: 500 INJECTION, POWDER, LYOPHILIZED, FOR SOLUTION INTRAVENOUS at 08:01

## 2022-01-02 RX ADMIN — PIPERACILLIN AND TAZOBACTAM 4.5 G: 4; .5 INJECTION, POWDER, LYOPHILIZED, FOR SOLUTION INTRAVENOUS; PARENTERAL at 09:01

## 2022-01-02 RX ADMIN — OXYCODONE AND ACETAMINOPHEN 1 TABLET: 10; 325 TABLET ORAL at 10:01

## 2022-01-02 RX ADMIN — MIRTAZAPINE 15 MG: 7.5 TABLET ORAL at 08:01

## 2022-01-02 RX ADMIN — SODIUM CHLORIDE, SODIUM LACTATE, POTASSIUM CHLORIDE, AND CALCIUM CHLORIDE 500 ML: .6; .31; .03; .02 INJECTION, SOLUTION INTRAVENOUS at 09:01

## 2022-01-02 RX ADMIN — PREGABALIN 50 MG: 50 CAPSULE ORAL at 08:01

## 2022-01-02 RX ADMIN — INSULIN DETEMIR 5 UNITS: 100 INJECTION, SOLUTION SUBCUTANEOUS at 08:01

## 2022-01-02 RX ADMIN — PREGABALIN 50 MG: 50 CAPSULE ORAL at 04:01

## 2022-01-02 RX ADMIN — PIPERACILLIN AND TAZOBACTAM 4.5 G: 4; .5 INJECTION, POWDER, LYOPHILIZED, FOR SOLUTION INTRAVENOUS; PARENTERAL at 05:01

## 2022-01-02 RX ADMIN — ATORVASTATIN CALCIUM 80 MG: 40 TABLET, FILM COATED ORAL at 08:01

## 2022-01-02 RX ADMIN — PIPERACILLIN AND TAZOBACTAM 4.5 G: 4; .5 INJECTION, POWDER, LYOPHILIZED, FOR SOLUTION INTRAVENOUS; PARENTERAL at 12:01

## 2022-01-02 RX ADMIN — OXYCODONE AND ACETAMINOPHEN 1 TABLET: 10; 325 TABLET ORAL at 04:01

## 2022-01-02 RX ADMIN — MORPHINE SULFATE 2 MG: 2 INJECTION, SOLUTION INTRAMUSCULAR; INTRAVENOUS at 08:01

## 2022-01-02 RX ADMIN — INSULIN ASPART 2 UNITS: 100 INJECTION, SOLUTION INTRAVENOUS; SUBCUTANEOUS at 04:01

## 2022-01-02 NOTE — CONSULTS
Consult order, acknowledged. Previously consulted, following up regarding care.    [facility] Progress Note    Assessment/Plan:     Suyapa Connelly is a 56 y.o. female PMH DM2, PVD, osteomyelitis, right AKA, CKD3, CAD s/p CABG, afib on eliquis, Left AKA in May 2021.      admit for right foot pain x 1 month with right heel ulcer with purulent foul smelling drainage, with  MRI suggestive of osteo in R-toes and RLE US arteries PVD noted. Was seen by ID service , plans were to dc abx sp R-BKA ( by gen surgery). Vancomycin and Zosyn at time of ID consultation.      Persistent leukocytosis   - BCx NGTD  -Likely post-op leukemoid reaction  -Discontinue vancomycin and Zosyn -- with noted slight increase in Cr  -Recommend starting PO Ciprofloxacin 500mg q12h  -Recommend obtaining UA w/ reflex to UCx, repeat CXR, and repeat BCx x2 as part of infectious w/u     Chavo Fowler MD  [facility], PGY-4  Cell: [phone]    Thank you for allowing us to participate in the care of this patient. We will continue to follow along. Case has been discussed with consult staff, who is in agreement with assessment and plan.    Subjective:      No acute events overnight. Denies any fevers, chills, NS, NV. Occasional aches and pain controlled with PRN meds.     Objective:   Last 24 Hour Vital Signs:  BP  Min: 91/58  Max: 157/58  Temp  Av.2 °F (36.8 °C)  Min: 96.7 °F (35.9 °C)  Max: 99.1 °F (37.3 °C)  Pulse  Av.8  Min: 90  Max: 105  Resp  Av.2  Min: 16  Max: 20  SpO2  Av.6 %  Min: 94 %  Max: 100 %  I/O last 3 completed shifts:  In: 430 [Blood:230; IV Piggyback:200]  Out: 350 [Urine:350]    Physical Exam  Constitutional:       Appearance: Normal appearance.   HENT:      Head: Normocephalic and atraumatic.      Nose: Nose normal.      Mouth/Throat:      Mouth: Mucous membranes are dry.      Pharynx: Oropharynx is clear.   Eyes:      Extraocular Movements: Extraocular  movements intact.      Conjunctiva/sclera: Conjunctivae normal.   Cardiovascular:      Rate and Rhythm: Normal rate and regular rhythm.      Pulses: Normal pulses.      Heart sounds: Normal heart sounds.   Pulmonary:      Effort: Pulmonary effort is normal.      Breath sounds: Normal breath sounds.   Abdominal:      Palpations: Abdomen is soft.   Musculoskeletal:         General: Normal range of motion.      Cervical back: Normal range of motion and neck supple.      Comments: RLE site of BKA bandaged  Incision site clean and intact  Some minute serosanguinous drainage from 2 areas of incision site  No surrounding cellulitis noted  Healing appropriately when seen on exam (1/2)   Skin:     General: Skin is warm and dry.   Neurological:      General: No focal deficit present.      Mental Status: She is alert and oriented to person, place, and time.   Psychiatric:         Mood and Affect: Mood normal.         Behavior: Behavior normal.         Laboratory:  Laboratory Data Reviewed: yes  Pertinent Findings:  Recent Labs   Lab 12/31/21  0548 01/01/22  0653 01/02/22  0520   WBC 19.70* 17.11* 17.96*   HGB 7.5* 7.0* 8.1*   HCT 24.5* 23.1* 26.0*   * 532* 575*   MCV 97 100* 97   RDW 14.1 14.5 14.5   * 134* 134*   K 3.8 3.6 3.7    104 104   CO2 20* 21* 20*   BUN 33* 41* 49*   CREATININE 1.5* 1.8* 1.7*   * 103 80   ALBUMIN  --  1.1* 1.2*         Microbiology Data:  Microbiology Results (last 7 days)     Procedure Component Value Units Date/Time    Blood culture [[id number]] Collected: 12/30/21 1412    Order Status: Completed Specimen: Blood from Peripheral, Right Arm Updated: 01/01/22 2212     Blood Culture, Routine No Growth to date      No Growth to date      No Growth to date    Blood culture [[id number]] Collected: 12/30/21 1412    Order Status: Completed Specimen: Blood from Peripheral, Right Hand Updated: 01/01/22 2212     Blood Culture, Routine No Growth to date      No Growth to date      No  "Growth to date            Antimicrobials:  Antibiotics (From admission, onward)            Start     Stop Route Frequency Ordered    12/30/21 1100  piperacillin-tazobactam 4.5 g in dextrose 5 % 100 mL IVPB (ready to mix system)         -- IV Every 8 hours (non-standard times) 12/30/21 0959    12/30/21 1057  vancomycin - pharmacy to dose  (vancomycin IVPB)        "And" Linked Group Details    -- IV pharmacy to manage frequency 12/30/21 0959        Antifungals (From admission, onward)            None        Antivirals (From admission, onward)    None            Other Results:  Radiology Results:  X-Ray Chest 1 View    Result Date: 12/30/2021  EXAMINATION: XR CHEST 1 VIEW CLINICAL HISTORY: sepsis; TECHNIQUE: Single frontal view of the chest was performed. COMPARISON: Chest radiograph performed 07/04/2021 FINDINGS: Cardiac monitoring leads are noted.  Status post median sternotomy.  Linear platelike opacities are noted in bilateral mid and lower lung zones.  No definite pneumothorax or pleural effusion. No acute findings are suggested visualized abdomen.  Osseous and soft tissue structures appear without definite acute change.     Linear platelike opacities in bilateral mid lower lung zones may relate to atelectasis and/or scar.  Note that airspace consolidation related to infectious or noninfectious inflammatory etiology difficult to entirely exclude.  Attention on follow-up may be of benefit in this regard. Electronically signed by: Huang Miller Date:    12/30/2021 Time:    14:23    X-Ray Foot Complete Right    Result Date: 12/21/2021  EXAMINATION: XR FOOT COMPLETE 3 VIEW RIGHT CLINICAL HISTORY: . Pain, unspecified TECHNIQUE: AP, lateral, and oblique views of the right foot were performed. COMPARISON: July 6, 2015 FINDINGS: No acute fractures.  Interval development of extensive generalized osteopenia.  No definite acute fractures or bone destruction.  Small-vessel calcific atherosclerotic changes.  Mild soft tissue " swelling dorsally at the level of the metatarsals.     As above. Electronically signed by: Gaurav Elizabeth Date:    12/21/2021 Time:    16:32    US Lower Extremity Arteries Right    Result Date: 12/21/2021  EXAMINATION: US LOWER EXTREMITY ARTERIES RIGHT CLINICAL HISTORY: Unspecified disturbances of skin sensation TECHNIQUE: Doppler evaluation of the right lower extremity arteries was performed. COMPARISON: None FINDINGS: Right common femoral and profundus femoral arteries are patent with peak velocities of 108, 151 respectively. Superficial femoral artery in the proximal and midportion demonstrates triphasic and biphasic waveform and peak velocities of 50 and 34 centimeters/second with blunting of the waveform compatible with pre occlusive pattern.  There is occlusion of the distal superficial femoral artery, popliteal artery with recanalization of the trifurcation with post occlusive waveform in the anterior tibial and posterior tibial arteries measuring 31 and 30 centimeters/second respectively.     Occlusion of the distal superficial femoral and popliteal artery on the right with reconstitution of the trifurcation via collaterals. The remainder of the proximal right lower extremity arteries are patent as described. This report was flagged in Epic as abnormal. Electronically signed by: Reina Deal Date:    12/21/2021 Time:    19:10    MRI Foot Toes Without Contrast Right    Result Date: 12/21/2021  EXAMINATION: MRI FOOT TOES WITHOUT CONTRAST RIGHT CLINICAL HISTORY: Foot swelling, diabetic, osteomyelitis suspected, xray done; TECHNIQUE: Multiplanar multisequence MR imaging of right forefoot was performed without gadolinium. COMPARISON: Right forefoot x-ray, 12/21/2021 at 16:21 hours FINDINGS: There appears to be a soft tissue defect over the periungual region with exposed bone of the great toe.  Soft tissue edema in the remaining soft tissues of the toe and forefoot are noted without drainable abscess. There is  bone marrow edema throughout the proximal phalanx of the great toe.  A focus of mixed signal intensity in the mid diaphysis of the 1st metatarsal suggests cartilaginous lesions such as enchondroma or repaired benign fibrous lesion. The remainder of the bones and soft tissues appear unremarkable by MR. Flexor and extensor tendons appear intact.  Intertarsal spaces are maintained.  No evidence of neuroma.     Bone marrow edema in the great toe phalanges compatible with osteomyelitis. Soft tissue defect suggested over the periungual regions of the dorsal great toe.  Correlate for exposed is distal phalanges. Benign appearing vascular or fibrous lesion in the mid diaphysis of the 1st meta tarsal. Mild generalized subcutaneous edema in the forefoot compatible with cellulitis without drainable abscess or definite deep muscle or tendon space involvement. Electronically signed by: Rush Deal Date:    12/21/2021 Time:    21:09      Current Medications:     Infusions:       Scheduled:   allopurinoL  100 mg Oral Daily    atorvastatin  80 mg Oral QHS    insulin detemir U-100  5 Units Subcutaneous QHS    lactated ringers  500 mL Intravenous Once    mirtazapine  15 mg Oral QHS    piperacillin-tazobactam (ZOSYN) IVPB  4.5 g Intravenous Q8H    polyethylene glycol  17 g Oral Daily    pregabalin  50 mg Oral TID    senna-docusate 8.6-50 mg  1 tablet Oral BID    sertraline  50 mg Oral Daily        PRN:  sodium chloride, sodium chloride, acetaminophen, dextrose 50%, dextrose 50%, glucagon (human recombinant), glucose, glucose, hydrALAZINE, influenza, insulin aspart U-100, melatonin, morphine, naloxone, ondansetron, oxyCODONE-acetaminophen, pneumoc 13-azam conj-dip cr(PF), sars-cov-2 (covid-19), sodium chloride 0.9%, Pharmacy to dose Vancomycin consult **AND** vancomycin - pharmacy to dose

## 2022-01-02 NOTE — PLAN OF CARE
Plan of care reviewed with patient, understanding verbalized. Pt remains SR on tele. No complaints or acute distress noted. Instructed to call for any assistance, understanding verbalized. Weight shift assistance provided. BG monitored and SSI administered per orders. Bed alarm on, call light in reach, fall precautions in place. Will continue current plan of care.

## 2022-01-02 NOTE — NURSING
Patient safety maintained. Medications administered per order. Monitoring pain and treating as needed. Dressing to right lower extremity changed.One unit of blood administered for a hemoglobin level of 7. Bed in low position, wheels locked, call bell at reach. Patient instructed to call for assistance as needed.

## 2022-01-02 NOTE — PROGRESS NOTES
Pharmacokinetic Assessment Follow Up: IV Vancomycin    Vancomycin serum concentration assessment(s):    The random level was drawn correctly and can be used to guide therapy at this time. The measurement is within the desired definitive target range of 15 to 20 mcg/mL.    Vancomycin Regimen Plan:    Re-dose when the random level is less than 20 mcg/mL, next level to be drawn at 0400 on 1/3    Drug levels (last 3 results):  Recent Labs   Lab Result Units 12/30/21  0816 12/31/21  0548 01/01/22  0653 01/02/22  0520   Vancomycin, Random ug/mL  --  14.8 19.4 15.2   Vancomycin-Trough ug/mL 22.1*  --   --   --        Pharmacy will continue to follow and monitor vancomycin.    Please contact pharmacy at extension [phone] for questions regarding this assessment.    Thank you for the consult,   Balbina Hodges       Patient brief summary:  Suyapa Connelly is a 56 y.o. female initiated on antimicrobial therapy with IV Vancomycin for treatment of bone/joint infection    The patient's current regimen is pulse dosing.    Drug Allergies:   Review of patient's allergies indicates:   Allergen Reactions    Ciprofloxacin Itching    Contrast media      Kidney injury    Iodine      Kidney injury       Actual Body Weight:   79.2 kg    Renal Function:   Estimated Creatinine Clearance: 36.7 mL/min (A) (based on SCr of 1.8 mg/dL (H)).,     Dialysis Method (if applicable):  N/A    CBC (last 72 hours):  Recent Labs   Lab Result Units 12/30/21  0816 12/31/21  0548 01/01/22  0653   WBC K/uL 17.84* 19.70* 17.11*   Hemoglobin g/dL 7.5* 7.5* 7.0*   Hematocrit % 24.9* 24.5* 23.1*   Platelets K/uL 593* 569* 532*   Gran % % 80.0* 78.5* 78.1*   Lymph % % 8.6* 9.1* 9.2*   Mono % % 10.0 10.8 10.3   Eosinophil % % 0.5 0.5 1.3   Basophil % % 0.3 0.4 0.5   Differential Method  Automated Automated Automated       Metabolic Panel (last 72 hours):  Recent Labs   Lab Result Units 12/30/21  0816 12/31/21  0548 01/01/22  0653   Sodium mmol/L 136 135* 134*    Potassium mmol/L 3.1* 3.8 3.6   Chloride mmol/L 105 106 104   CO2 mmol/L 19* 20* 21*   Glucose mg/dL 82 111* 103   BUN mg/dL 22* 33* 41*   Creatinine mg/dL 1.4 1.5* 1.8*   Albumin g/dL  --   --  1.1*   Magnesium mg/dL  --  1.9  --    Phosphorus mg/dL  --   --  3.2       Vancomycin Administrations:  vancomycin given in the last 96 hours                     vancomycin 500 mg in dextrose 5 % 100 mL IVPB (ready to mix system) (mg) 500 mg New Bag 12/31/21 0819    vancomycin 500 mg in dextrose 5 % 100 mL IVPB (ready to mix system) (mg) 500 mg New Bag 12/29/21 1123                    Microbiologic Results:  Microbiology Results (last 7 days)       Procedure Component Value Units Date/Time    Blood culture [[id number]] Collected: 12/30/21 1412    Order Status: Completed Specimen: Blood from Peripheral, Right Arm Updated: 01/01/22 2212     Blood Culture, Routine No Growth to date      No Growth to date      No Growth to date    Blood culture [[id number]] Collected: 12/30/21 1412    Order Status: Completed Specimen: Blood from Peripheral, Right Hand Updated: 01/01/22 2212     Blood Culture, Routine No Growth to date      No Growth to date      No Growth to date

## 2022-01-03 LAB
ANION GAP SERPL CALC-SCNC: 11 MMOL/L (ref 8–16)
BACTERIA #/AREA URNS HPF: ABNORMAL /HPF
BASOPHILS # BLD AUTO: 0.06 K/UL (ref 0–0.2)
BASOPHILS NFR BLD: 0.4 % (ref 0–1.9)
BILIRUB UR QL STRIP: NEGATIVE
BUN SERPL-MCNC: 49 MG/DL (ref 6–20)
CALCIUM SERPL-MCNC: 7.4 MG/DL (ref 8.7–10.5)
CHLORIDE SERPL-SCNC: 105 MMOL/L (ref 95–110)
CLARITY UR: ABNORMAL
CO2 SERPL-SCNC: 18 MMOL/L (ref 23–29)
COLOR UR: YELLOW
CREAT SERPL-MCNC: 1.8 MG/DL (ref 0.5–1.4)
CREAT UR-MCNC: 74 MG/DL (ref 15–325)
DIFFERENTIAL METHOD: ABNORMAL
EOSINOPHIL # BLD AUTO: 0.3 K/UL (ref 0–0.5)
EOSINOPHIL NFR BLD: 1.7 % (ref 0–8)
ERYTHROCYTE [DISTWIDTH] IN BLOOD BY AUTOMATED COUNT: 14.4 % (ref 11.5–14.5)
EST. GFR  (AFRICAN AMERICAN): 36 ML/MIN/1.73 M^2
EST. GFR  (NON AFRICAN AMERICAN): 31 ML/MIN/1.73 M^2
GLUCOSE SERPL-MCNC: 112 MG/DL (ref 70–110)
GLUCOSE UR QL STRIP: ABNORMAL
HCT VFR BLD AUTO: 26.4 % (ref 37–48.5)
HGB BLD-MCNC: 8 G/DL (ref 12–16)
HGB UR QL STRIP: ABNORMAL
HYALINE CASTS #/AREA URNS LPF: 0 /LPF
IMM GRANULOCYTES # BLD AUTO: 0.11 K/UL (ref 0–0.04)
IMM GRANULOCYTES NFR BLD AUTO: 0.7 % (ref 0–0.5)
KETONES UR QL STRIP: NEGATIVE
LEUKOCYTE ESTERASE UR QL STRIP: ABNORMAL
LYMPHOCYTES # BLD AUTO: 1.5 K/UL (ref 1–4.8)
LYMPHOCYTES NFR BLD: 10.1 % (ref 18–48)
MAGNESIUM SERPL-MCNC: 2.1 MG/DL (ref 1.6–2.6)
MCH RBC QN AUTO: 29.9 PG (ref 27–31)
MCHC RBC AUTO-ENTMCNC: 30.3 G/DL (ref 32–36)
MCV RBC AUTO: 99 FL (ref 82–98)
MICROSCOPIC COMMENT: ABNORMAL
MONOCYTES # BLD AUTO: 1.7 K/UL (ref 0.3–1)
MONOCYTES NFR BLD: 11.3 % (ref 4–15)
NEUTROPHILS # BLD AUTO: 11.3 K/UL (ref 1.8–7.7)
NEUTROPHILS NFR BLD: 75.8 % (ref 38–73)
NITRITE UR QL STRIP: NEGATIVE
NRBC BLD-RTO: 0 /100 WBC
PH UR STRIP: 6 [PH] (ref 5–8)
PHOSPHATE SERPL-MCNC: 3.3 MG/DL (ref 2.7–4.5)
PLATELET # BLD AUTO: 570 K/UL (ref 150–450)
PMV BLD AUTO: 10.3 FL (ref 9.2–12.9)
POCT GLUCOSE: 145 MG/DL (ref 70–110)
POCT GLUCOSE: 226 MG/DL (ref 70–110)
POCT GLUCOSE: 263 MG/DL (ref 70–110)
POCT GLUCOSE: 290 MG/DL (ref 70–110)
POTASSIUM SERPL-SCNC: 4 MMOL/L (ref 3.5–5.1)
PROT UR QL STRIP: ABNORMAL
RBC # BLD AUTO: 2.68 M/UL (ref 4–5.4)
RBC #/AREA URNS HPF: 12 /HPF (ref 0–4)
SODIUM SERPL-SCNC: 134 MMOL/L (ref 136–145)
SODIUM UR-SCNC: 33 MMOL/L (ref 20–250)
SP GR UR STRIP: 1.02 (ref 1–1.03)
SQUAMOUS #/AREA URNS HPF: 14 /HPF
URN SPEC COLLECT METH UR: ABNORMAL
UROBILINOGEN UR STRIP-ACNC: NEGATIVE EU/DL
UUN UR-MCNC: 387 MG/DL (ref 140–1050)
VANCOMYCIN SERPL-MCNC: 14.7 UG/ML
WBC # BLD AUTO: 14.9 K/UL (ref 3.9–12.7)
WBC #/AREA URNS HPF: >100 /HPF (ref 0–5)
WBC CLUMPS URNS QL MICRO: ABNORMAL
YEAST URNS QL MICRO: ABNORMAL

## 2022-01-03 PROCEDURE — 85025 COMPLETE CBC W/AUTO DIFF WBC: CPT | Performed by: STUDENT IN AN ORGANIZED HEALTH CARE EDUCATION/TRAINING PROGRAM

## 2022-01-03 PROCEDURE — 97530 THERAPEUTIC ACTIVITIES: CPT

## 2022-01-03 PROCEDURE — 84300 ASSAY OF URINE SODIUM: CPT | Performed by: STUDENT IN AN ORGANIZED HEALTH CARE EDUCATION/TRAINING PROGRAM

## 2022-01-03 PROCEDURE — 81000 URINALYSIS NONAUTO W/SCOPE: CPT | Performed by: STUDENT IN AN ORGANIZED HEALTH CARE EDUCATION/TRAINING PROGRAM

## 2022-01-03 PROCEDURE — 83735 ASSAY OF MAGNESIUM: CPT | Performed by: STUDENT IN AN ORGANIZED HEALTH CARE EDUCATION/TRAINING PROGRAM

## 2022-01-03 PROCEDURE — 63600175 PHARM REV CODE 636 W HCPCS: Performed by: SURGERY

## 2022-01-03 PROCEDURE — 25000003 PHARM REV CODE 250: Performed by: SURGERY

## 2022-01-03 PROCEDURE — 87106 FUNGI IDENTIFICATION YEAST: CPT | Performed by: STUDENT IN AN ORGANIZED HEALTH CARE EDUCATION/TRAINING PROGRAM

## 2022-01-03 PROCEDURE — 84100 ASSAY OF PHOSPHORUS: CPT | Performed by: STUDENT IN AN ORGANIZED HEALTH CARE EDUCATION/TRAINING PROGRAM

## 2022-01-03 PROCEDURE — 11000001 HC ACUTE MED/SURG PRIVATE ROOM

## 2022-01-03 PROCEDURE — 87086 URINE CULTURE/COLONY COUNT: CPT | Performed by: STUDENT IN AN ORGANIZED HEALTH CARE EDUCATION/TRAINING PROGRAM

## 2022-01-03 PROCEDURE — 87088 URINE BACTERIA CULTURE: CPT | Performed by: STUDENT IN AN ORGANIZED HEALTH CARE EDUCATION/TRAINING PROGRAM

## 2022-01-03 PROCEDURE — 84540 ASSAY OF URINE/UREA-N: CPT | Performed by: STUDENT IN AN ORGANIZED HEALTH CARE EDUCATION/TRAINING PROGRAM

## 2022-01-03 PROCEDURE — 80202 ASSAY OF VANCOMYCIN: CPT | Performed by: STUDENT IN AN ORGANIZED HEALTH CARE EDUCATION/TRAINING PROGRAM

## 2022-01-03 PROCEDURE — 80048 BASIC METABOLIC PNL TOTAL CA: CPT | Performed by: STUDENT IN AN ORGANIZED HEALTH CARE EDUCATION/TRAINING PROGRAM

## 2022-01-03 PROCEDURE — 82570 ASSAY OF URINE CREATININE: CPT | Performed by: STUDENT IN AN ORGANIZED HEALTH CARE EDUCATION/TRAINING PROGRAM

## 2022-01-03 PROCEDURE — 27000207 HC ISOLATION

## 2022-01-03 PROCEDURE — 63600175 PHARM REV CODE 636 W HCPCS: Performed by: STUDENT IN AN ORGANIZED HEALTH CARE EDUCATION/TRAINING PROGRAM

## 2022-01-03 PROCEDURE — 25000003 PHARM REV CODE 250: Performed by: STUDENT IN AN ORGANIZED HEALTH CARE EDUCATION/TRAINING PROGRAM

## 2022-01-03 PROCEDURE — 25000003 PHARM REV CODE 250: Performed by: NURSE PRACTITIONER

## 2022-01-03 PROCEDURE — 63600175 PHARM REV CODE 636 W HCPCS: Performed by: NURSE PRACTITIONER

## 2022-01-03 PROCEDURE — 25000003 PHARM REV CODE 250: Performed by: PSYCHIATRY & NEUROLOGY

## 2022-01-03 RX ORDER — AMOXICILLIN AND CLAVULANATE POTASSIUM 875; 125 MG/1; MG/1
1 TABLET, FILM COATED ORAL EVERY 12 HOURS
Status: DISCONTINUED | OUTPATIENT
Start: 2022-01-03 | End: 2022-01-09

## 2022-01-03 RX ADMIN — MORPHINE SULFATE 2 MG: 2 INJECTION, SOLUTION INTRAMUSCULAR; INTRAVENOUS at 11:01

## 2022-01-03 RX ADMIN — PREGABALIN 50 MG: 50 CAPSULE ORAL at 04:01

## 2022-01-03 RX ADMIN — MORPHINE SULFATE 2 MG: 2 INJECTION, SOLUTION INTRAMUSCULAR; INTRAVENOUS at 09:01

## 2022-01-03 RX ADMIN — PREGABALIN 50 MG: 50 CAPSULE ORAL at 09:01

## 2022-01-03 RX ADMIN — SERTRALINE HYDROCHLORIDE 50 MG: 50 TABLET ORAL at 08:01

## 2022-01-03 RX ADMIN — PIPERACILLIN AND TAZOBACTAM 4.5 G: 4; .5 INJECTION, POWDER, LYOPHILIZED, FOR SOLUTION INTRAVENOUS; PARENTERAL at 12:01

## 2022-01-03 RX ADMIN — ATORVASTATIN CALCIUM 80 MG: 40 TABLET, FILM COATED ORAL at 09:01

## 2022-01-03 RX ADMIN — DOCUSATE SODIUM AND SENNOSIDES 1 TABLET: 8.6; 5 TABLET, FILM COATED ORAL at 08:01

## 2022-01-03 RX ADMIN — OXYCODONE AND ACETAMINOPHEN 1 TABLET: 10; 325 TABLET ORAL at 08:01

## 2022-01-03 RX ADMIN — INSULIN DETEMIR 5 UNITS: 100 INJECTION, SOLUTION SUBCUTANEOUS at 09:01

## 2022-01-03 RX ADMIN — AMOXICILLIN AND CLAVULANATE POTASSIUM 1 TABLET: 875; 125 TABLET, FILM COATED ORAL at 11:01

## 2022-01-03 RX ADMIN — ALLOPURINOL 100 MG: 100 TABLET ORAL at 08:01

## 2022-01-03 RX ADMIN — PIPERACILLIN AND TAZOBACTAM 4.5 G: 4; .5 INJECTION, POWDER, LYOPHILIZED, FOR SOLUTION INTRAVENOUS; PARENTERAL at 08:01

## 2022-01-03 RX ADMIN — MIRTAZAPINE 15 MG: 7.5 TABLET ORAL at 09:01

## 2022-01-03 RX ADMIN — INSULIN ASPART 1 UNITS: 100 INJECTION, SOLUTION INTRAVENOUS; SUBCUTANEOUS at 09:01

## 2022-01-03 RX ADMIN — AMOXICILLIN AND CLAVULANATE POTASSIUM 1 TABLET: 875; 125 TABLET, FILM COATED ORAL at 09:01

## 2022-01-03 RX ADMIN — OXYCODONE AND ACETAMINOPHEN 1 TABLET: 10; 325 TABLET ORAL at 03:01

## 2022-01-03 RX ADMIN — INSULIN ASPART 3 UNITS: 100 INJECTION, SOLUTION INTRAVENOUS; SUBCUTANEOUS at 12:01

## 2022-01-03 RX ADMIN — OXYCODONE AND ACETAMINOPHEN 1 TABLET: 10; 325 TABLET ORAL at 01:01

## 2022-01-03 RX ADMIN — PREGABALIN 50 MG: 50 CAPSULE ORAL at 08:01

## 2022-01-03 NOTE — ASSESSMENT & PLAN NOTE
- stop amlodipine 5mg daily for now due to soft blood pressures, resume as able  - Monitor pressures  - given IVF on 1/1 and 1/2 due to soft BP

## 2022-01-03 NOTE — PLAN OF CARE
Pt progressing towards goals. Pt reporting 6/10 pain at incision site throughout session. Pt requires ModA for sup>sit 2/2 RLE management. Pt requires MaxA to scoot to EOB. Pt agreeable to use slideboard for t/f this date. Pt requires MaxA x1 and Venecia x1 for slideboard t/f to drop-arm WC.     Problem: Occupational Therapy Goal  Goal: Occupational Therapy Goal  Description: Goals to be met by: 1/29/2022     Patient will increase functional independence with ADLs by performing:    UE Dressing with Modified [place].  Grooming while seated with Modified [place].  Toileting from bedside commode with Modified [place] for hygiene and clothing management.   Rolling to Bilateral with Modified [place].   Supine to sit with Modified [place].  Toilet transfer to bedside commode with Minimal Assistance.    Outcome: Ongoing, Progressing

## 2022-01-03 NOTE — PROGRESS NOTES
[facility]                   Pharmacy  Pharmacy Vancomycin/AG Sign-off    Therapy with vancomycin complete and/or consult discontinued by provider.  Pharmacy will sign off, please re-consult as needed. Thank you for allowing us to participate in this patient's care.     Km Mora, PharmD  [phone]

## 2022-01-03 NOTE — PROGRESS NOTES
"surgeyr follow up  BP (!) 147/67   Pulse 105   Temp 97.7 °F (36.5 °C) (Oral)   Resp 18   Ht 5' 5" (1.651 m)   Wt 79.2 kg (174 lb 9.7 oz)   LMP 09/30/2015 (Exact Date)   SpO2 98%   BMI 29.06 kg/m²   I/O last 3 completed shifts:  In: 230 [Blood:230]  Out: 0   No intake/output data recorded.  Recent Results (from the past 336 hour(s))   CBC auto differential    Collection Time: 01/03/22  5:19 AM   Result Value Ref Range    WBC 14.90 (H) 3.90 - 12.70 K/uL    Hemoglobin 8.0 (L) 12.0 - 16.0 g/dL    Hematocrit 26.4 (L) 37.0 - 48.5 %    Platelets 570 (H) 150 - 450 K/uL   CBC Auto Differential    Collection Time: 01/02/22  5:20 AM   Result Value Ref Range    WBC 17.96 (H) 3.90 - 12.70 K/uL    Hemoglobin 8.1 (L) 12.0 - 16.0 g/dL    Hematocrit 26.0 (L) 37.0 - 48.5 %    Platelets 575 (H) 150 - 450 K/uL   CBC Auto Differential    Collection Time: 01/01/22  6:53 AM   Result Value Ref Range    WBC 17.11 (H) 3.90 - 12.70 K/uL    Hemoglobin 7.0 (L) 12.0 - 16.0 g/dL    Hematocrit 23.1 (L) 37.0 - 48.5 %    Platelets 532 (H) 150 - 450 K/uL   wbc decreasing , doinirg better   continue present treatment.  "

## 2022-01-03 NOTE — PLAN OF CARE
Problem: Physical Therapy Goal  Goal: Physical Therapy Goal  Description: Goals to be met by: 22     Patient will increase functional independence with mobility by performin. Supine to sit with Minimal Assistance  2. Sit to supine with Minimal Assistance  3. Rolling to Left and Right with Stand-by Assistance.  4. Bed to WC transfer with Minimal Assistance using Slideboard.    Outcome: Ongoing, Progressing     Pt completed bed > WC transfer with slideboard and MaxA x 1 and Venecia x 1. Pt needing slightly less assistance when using slideboard. Max motivation and encouragement to use slideboard and pt agreeable to use after discussion on possible benefits. Cont with PT POC per pt's tolerance.

## 2022-01-03 NOTE — SUBJECTIVE & OBJECTIVE
Interval History: Patient's ROSLYN slowly improving, giving additional IVF today, Hb up to 8.1 today. ID re-consulted due to persistent leukocytosis - continuing IV vancomycin, unable to de-escalate from zosyn to ciprofloxacin as patient is allergic (allergy confirmed). Repeat blood cultures, U/A, and CXR ordered.     Review of Systems   Constitutional: Negative for chills and fever.   Respiratory: Negative for cough and shortness of breath.    Cardiovascular: Negative for chest pain and leg swelling.   Gastrointestinal: Negative for abdominal pain and nausea.   Genitourinary: Negative for difficulty urinating.   Musculoskeletal: Positive for arthralgias and myalgias.        Right leg pain   Skin: Positive for wound.   Neurological: Positive for weakness.   Psychiatric/Behavioral: Positive for dysphoric mood.        Depressed     Objective:     Vital Signs (Most Recent):  Temp: 98.3 °F (36.8 °C) (01/02/22 1611)  Pulse: 99 (01/02/22 1611)  Resp: 20 (01/02/22 1642)  BP: (!) 147/70 (01/02/22 1611)  SpO2: 99 % (01/02/22 1611) Vital Signs (24h Range):  Temp:  [98 °F (36.7 °C)-99.1 °F (37.3 °C)] 98.3 °F (36.8 °C)  Pulse:  [] 99  Resp:  [16-20] 20  SpO2:  [94 %-100 %] 99 %  BP: ()/(58-70) 147/70     Weight: 79.2 kg (174 lb 9.7 oz)  Body mass index is 29.06 kg/m².    Intake/Output Summary (Last 24 hours) at 1/2/2022 1915  Last data filed at 1/2/2022 0005  Gross per 24 hour   Intake 230 ml   Output --   Net 230 ml      Physical Exam  Vitals and nursing note reviewed.   Constitutional:       General: She is not in acute distress.     Appearance: She is obese.   Cardiovascular:      Rate and Rhythm: Normal rate.   Pulmonary:      Effort: Pulmonary effort is normal. No respiratory distress.   Musculoskeletal:      Cervical back: Normal range of motion.      Comments: Bilateral leg amputations   Skin:     General: Skin is dry.      Comments: Right leg BKA   Neurological:      Mental Status: She is alert and oriented to  person, place, and time.      Motor: Weakness present.   Psychiatric:         Behavior: Behavior normal.         Thought Content: Thought content normal.      Comments: Depressed about losing leg         Significant Labs:   All pertinent labs within the past 24 hours have been reviewed.  BMP:   Recent Labs   Lab 01/02/22  0520   GLU 80   *   K 3.7      CO2 20*   BUN 49*   CREATININE 1.7*   CALCIUM 7.8*     CBC:   Recent Labs   Lab 01/01/22  0653 01/02/22  0520   WBC 17.11* 17.96*   HGB 7.0* 8.1*   HCT 23.1* 26.0*   * 575*       Significant Imaging: I have reviewed all pertinent imaging results/findings within the past 24 hours.

## 2022-01-03 NOTE — PT/OT/SLP PROGRESS
Occupational Therapy   Treatment    Name: Suyapa Connelly  MRN: [id number]  Admitting Diagnosis:  S/P BKA (below knee amputation) unilateral, right  6 Days Post-Op    Recommendations:     Discharge Recommendations: rehabilitation facility  Discharge Equipment Recommendations:  other (see comments) (TBD)  Barriers to discharge:  Other (Comment) (Pt requires increased assistance at this time)    Assessment:     Suyapa Connelly is a 56 y.o. female with a medical diagnosis of S/P BKA (below knee amputation) unilateral, right.  She presents with The primary encounter diagnosis was Gangrene. Diagnoses of Pain, Cold foot, Cellulitis, unspecified cellulitis site, Osteomyelitis of right foot, unspecified type, Chest pain, Uremia, Osteomyelitis of right foot, Gangrene of extremity, Diabetic ulcer of right foot associated with type 2 diabetes mellitus, with necrosis of muscle, unspecified part of foot, Diabetic ulcer of right midfoot associated with type 2 diabetes mellitus, with muscle involvement without evidence of necrosis, QT prolongation, and S/P BKA (below knee amputation) unilateral, right were also pertinent to this visit. Performance deficits affecting function are weakness,gait instability,decreased lower extremity function,impaired self care skills,impaired endurance,impaired functional mobilty,impaired balance,decreased safety awareness,pain,impaired skin,impaired joint extensibility,decreased coordination,edema.     Pt progressing towards goals. Pt reporting 6/10 pain at incision site throughout session. Pt requires ModA for sup>sit 2/2 RLE management. Pt requires MaxA to scoot to EOB. Pt agreeable to use slideboard for t/f this date. Pt requires MaxA x1 and Venecia x1 for slideboard t/f to drop-arm WC.     Rehab Prognosis:  Good; patient would benefit from acute skilled OT services to address these deficits and reach maximum level of function.       Plan:     Patient to be seen 5 x/week to address the above listed  problems via self-care/home management,therapeutic activities,therapeutic exercises  · Plan of Care Expires: 01/29/22  · Plan of Care Reviewed with: patient    Subjective     Pain/Comfort:  · Pain Rating 1: 6/10  · Location - Side 1: Right  · Location 1: leg  · Pain Addressed 1: Distraction,Cessation of Activity,Reposition,Nurse notified  · Pain Rating Post-Intervention 1: 6/10    Objective:     Communicated with: nsg prior to session.  Patient found HOB elevated with peripheral IV upon OT entry to room.    General Precautions: Standard, fall   Orthopedic Precautions:N/A   Braces: N/A  Respiratory Status: Room air     Occupational Performance:     Bed Mobility:    · Patient completed Scooting/Bridging with Max assistance 2/2 RLE management  · Patient completed Supine to Sit with moderate assistance 2/2 RLE management     Functional Mobility/Transfers:  · Patient completed Bed <> Chair Transfer using Slide Board technique with minimum assistance x1 and maximal assistance x1 with hand-held assist    Activities of Daily Living:  · NT      [facility] 6 Click ADL: 17    Treatment & Education:  Pt progressing towards goals.   Pt reporting 6/10 pain at incision site throughout session.   Pt requires ModA for sup>sit 2/2 RLE management.   Pt requires MaxA to scoot to EOB.   Pt agreeable to use slideboard for t/f this date.   Pt requires MaxA x1 and Venecia x1 for slideboard t/f to drop-arm WC.     Patient left up in chair with all lines intact, call button in reach, chair alarm on and nsg notifiedEducation:      GOALS:   Multidisciplinary Problems     Occupational Therapy Goals        Problem: Occupational Therapy Goal    Goal Priority Disciplines Outcome Interventions   Occupational Therapy Goal     OT, PT/OT Ongoing, Progressing    Description: Goals to be met by: 1/29/2022     Patient will increase functional independence with ADLs by performing:    UE Dressing with Modified [place].  Grooming while seated with Modified  [place].  Toileting from bedside commode with Modified [place] for hygiene and clothing management.   Rolling to Bilateral with Modified [place].   Supine to sit with Modified [place].  Toilet transfer to bedside commode with Minimal Assistance.                     Time Tracking:     OT Date of Treatment: 01/03/22  OT Start Time: 1150  OT Stop Time: 1217  OT Total Time (min): 27 min    Billable Minutes:Therapeutic Activity 13 cotx with PT    OT/MACKENZIE: OT     MACKENZIE Visit Number: 0    1/3/2022

## 2022-01-03 NOTE — PROGRESS NOTES
Consult order, acknowledged. Previously consulted, following up regarding care.    [facility] Progress Note    Assessment/Plan:     Suyapa Connelly is a 56 y.o. female PMH DM2, PVD, osteomyelitis, right AKA, CKD3, CAD s/p CABG, afib on eliquis, Left AKA in May 2021.      admit for right foot pain x 1 month with right heel ulcer with purulent foul smelling drainage, with  MRI suggestive of osteo in R-toes and RLE US arteries PVD noted. Was seen by ID service , plans were to dc abx sp R-BKA ( by gen surgery). Vancomycin and Zosyn at time of ID consultation.      - BCx NG  -1/2 BCx NG  -Likely component of post-op leukemoid reaction as well  -Discontinue vancomycin and Zosyn  -/2 CXR small opacity in LLL    FINAL RECOMMENDATION  Will treat as aspiration PNA  -Augmentin ,g/125mg q12h  -Start 1/3/22, stop 22  -ID will sign off  -No need for outpt ID f/u  -Call back if there are any questions, concerns, changes    Chavo Fowler MD  [facility], PGY-4  Cell: [phone]    Thank you for allowing us to participate in the care of this patient.    Subjective:      No acute events overnight. Denies any fevers, chills, NS, NV. Occasional aches and pain controlled with PRN meds.     Objective:   Last 24 Hour Vital Signs:  BP  Min: 111/65  Max: 161/102  Temp  Av.8 °F (36.6 °C)  Min: 96.6 °F (35.9 °C)  Max: 98.6 °F (37 °C)  Pulse  Av.3  Min: 90  Max: 111  Resp  Av.6  Min: 16  Max: 20  SpO2  Av.7 %  Min: 95 %  Max: 99 %  I/O last 3 completed shifts:  In: 230 [Blood:230]  Out: 0     Physical Exam  Constitutional:       Appearance: Normal appearance.   HENT:      Head: Normocephalic and atraumatic.      Nose: Nose normal.      Mouth/Throat:      Mouth: Mucous membranes are dry.      Pharynx: Oropharynx is clear.   Eyes:      Extraocular Movements: Extraocular movements intact.      Conjunctiva/sclera: Conjunctivae normal.   Cardiovascular:      Rate  and Rhythm: Normal rate and regular rhythm.      Pulses: Normal pulses.      Heart sounds: Normal heart sounds.   Pulmonary:      Effort: Pulmonary effort is normal.      Breath sounds: Normal breath sounds.   Abdominal:      Palpations: Abdomen is soft.   Musculoskeletal:         General: Normal range of motion.      Cervical back: Normal range of motion and neck supple.      Comments: RLE site of BKA bandaged  Incision site clean and intact  Some minute serosanguinous drainage from 2 areas of incision site  No surrounding cellulitis noted  Healing appropriately when seen on exam (1/2)   Skin:     General: Skin is warm and dry.   Neurological:      General: No focal deficit present.      Mental Status: She is alert and oriented to person, place, and time.   Psychiatric:         Mood and Affect: Mood normal.         Behavior: Behavior normal.         Laboratory:  Laboratory Data Reviewed: yes  Pertinent Findings:  Recent Labs   Lab 01/01/22  0653 01/02/22  0520 01/03/22  0519   WBC 17.11* 17.96* 14.90*   HGB 7.0* 8.1* 8.0*   HCT 23.1* 26.0* 26.4*   * 575* 570*   * 97 99*   RDW 14.5 14.5 14.4   * 134* 134*   K 3.6 3.7 4.0    104 105   CO2 21* 20* 18*   BUN 41* 49* 49*   CREATININE 1.8* 1.7* 1.8*    80 112*   ALBUMIN 1.1* 1.2*  --          Microbiology Data:  Microbiology Results (last 7 days)     Procedure Component Value Units Date/Time    Blood culture [[id number]] Collected: 01/02/22 1938    Order Status: Completed Specimen: Blood Updated: 01/03/22 0515     Blood Culture, Routine No Growth to date    Blood culture [[id number]] Collected: 01/02/22 1938    Order Status: Completed Specimen: Blood Updated: 01/03/22 0515     Blood Culture, Routine No Growth to date    Blood culture [[id number]] Collected: 12/30/21 1412    Order Status: Completed Specimen: Blood from Peripheral, Right Arm Updated: 01/02/22 2212     Blood Culture, Routine No Growth to date      No Growth to date      No  Growth to date      No Growth to date    Blood culture [[id number]] Collected: 12/30/21 1412    Order Status: Completed Specimen: Blood from Peripheral, Right Hand Updated: 01/02/22 2212     Blood Culture, Routine No Growth to date      No Growth to date      No Growth to date      No Growth to date            Antimicrobials:  Antibiotics (From admission, onward)            Start     Stop Route Frequency Ordered    01/03/22 1145  amoxicillin-clavulanate 875-125mg per tablet 1 tablet         -- Oral Every 12 hours 01/03/22 1040        Antifungals (From admission, onward)            None        Antivirals (From admission, onward)    None            Other Results:  Radiology Results:  X-Ray Chest 1 View    Result Date: 12/30/2021  EXAMINATION: XR CHEST 1 VIEW CLINICAL HISTORY: sepsis; TECHNIQUE: Single frontal view of the chest was performed. COMPARISON: Chest radiograph performed 07/04/2021 FINDINGS: Cardiac monitoring leads are noted.  Status post median sternotomy.  Linear platelike opacities are noted in bilateral mid and lower lung zones.  No definite pneumothorax or pleural effusion. No acute findings are suggested visualized abdomen.  Osseous and soft tissue structures appear without definite acute change.     Linear platelike opacities in bilateral mid lower lung zones may relate to atelectasis and/or scar.  Note that airspace consolidation related to infectious or noninfectious inflammatory etiology difficult to entirely exclude.  Attention on follow-up may be of benefit in this regard. Electronically signed by: Huang Miller Date:    12/30/2021 Time:    14:23    X-Ray Foot Complete Right    Result Date: 12/21/2021  EXAMINATION: XR FOOT COMPLETE 3 VIEW RIGHT CLINICAL HISTORY: . Pain, unspecified TECHNIQUE: AP, lateral, and oblique views of the right foot were performed. COMPARISON: July 6, 2015 FINDINGS: No acute fractures.  Interval development of extensive generalized osteopenia.  No definite acute fractures  or bone destruction.  Small-vessel calcific atherosclerotic changes.  Mild soft tissue swelling dorsally at the level of the metatarsals.     As above. Electronically signed by: Gaurav Elizabeth Date:    12/21/2021 Time:    16:32    US Lower Extremity Arteries Right    Result Date: 12/21/2021  EXAMINATION: US LOWER EXTREMITY ARTERIES RIGHT CLINICAL HISTORY: Unspecified disturbances of skin sensation TECHNIQUE: Doppler evaluation of the right lower extremity arteries was performed. COMPARISON: None FINDINGS: Right common femoral and profundus femoral arteries are patent with peak velocities of 108, 151 respectively. Superficial femoral artery in the proximal and midportion demonstrates triphasic and biphasic waveform and peak velocities of 50 and 34 centimeters/second with blunting of the waveform compatible with pre occlusive pattern.  There is occlusion of the distal superficial femoral artery, popliteal artery with recanalization of the trifurcation with post occlusive waveform in the anterior tibial and posterior tibial arteries measuring 31 and 30 centimeters/second respectively.     Occlusion of the distal superficial femoral and popliteal artery on the right with reconstitution of the trifurcation via collaterals. The remainder of the proximal right lower extremity arteries are patent as described. This report was flagged in Epic as abnormal. Electronically signed by: Reina Deal Date:    12/21/2021 Time:    19:10    MRI Foot Toes Without Contrast Right    Result Date: 12/21/2021  EXAMINATION: MRI FOOT TOES WITHOUT CONTRAST RIGHT CLINICAL HISTORY: Foot swelling, diabetic, osteomyelitis suspected, xray done; TECHNIQUE: Multiplanar multisequence MR imaging of right forefoot was performed without gadolinium. COMPARISON: Right forefoot x-ray, 12/21/2021 at 16:21 hours FINDINGS: There appears to be a soft tissue defect over the periungual region with exposed bone of the great toe.  Soft tissue edema in the  remaining soft tissues of the toe and forefoot are noted without drainable abscess. There is bone marrow edema throughout the proximal phalanx of the great toe.  A focus of mixed signal intensity in the mid diaphysis of the 1st metatarsal suggests cartilaginous lesions such as enchondroma or repaired benign fibrous lesion. The remainder of the bones and soft tissues appear unremarkable by MR. Flexor and extensor tendons appear intact.  Intertarsal spaces are maintained.  No evidence of neuroma.     Bone marrow edema in the great toe phalanges compatible with osteomyelitis. Soft tissue defect suggested over the periungual regions of the dorsal great toe.  Correlate for exposed is distal phalanges. Benign appearing vascular or fibrous lesion in the mid diaphysis of the 1st meta tarsal. Mild generalized subcutaneous edema in the forefoot compatible with cellulitis without drainable abscess or definite deep muscle or tendon space involvement. Electronically signed by: Rush Deal Date:    12/21/2021 Time:    21:09      Current Medications:     Infusions:       Scheduled:   allopurinoL  100 mg Oral Daily    amoxicillin-clavulanate 875-125mg  1 tablet Oral Q12H    atorvastatin  80 mg Oral QHS    insulin detemir U-100  5 Units Subcutaneous QHS    mirtazapine  15 mg Oral QHS    polyethylene glycol  17 g Oral Daily    pregabalin  50 mg Oral TID    senna-docusate 8.6-50 mg  1 tablet Oral BID    sertraline  50 mg Oral Daily        PRN:  sodium chloride, sodium chloride, acetaminophen, dextrose 50%, dextrose 50%, glucagon (human recombinant), glucose, glucose, hydrALAZINE, influenza, insulin aspart U-100, melatonin, morphine, naloxone, ondansetron, oxyCODONE-acetaminophen, pneumoc 13-azam conj-dip cr(PF), sars-cov-2 (covid-19), sodium chloride 0.9%

## 2022-01-03 NOTE — PT/OT/SLP PROGRESS
Physical Therapy Treatment    Patient Name:  Suyapa Connelly   MRN:  [id number]    Recommendations:     Discharge Recommendations:  rehabilitation facility   Discharge Equipment Recommendations:  (TBD)   Barriers to discharge: increased level of assist needed    Assessment:     Suyapa Connelly is a 56 y.o. female admitted with a medical diagnosis of S/P BKA (below knee amputation) unilateral, right.  She presents with the following impairments/functional limitations:  weakness,impaired functional mobilty,impaired balance,impaired self care skills,decreased lower extremity function,pain,decreased safety awareness,impaired skin,edema,decreased ROM,decreased coordination .Pt completed bed > WC transfer with slideboard and MaxA x 1 and Venecia x 1. Pt needing slightly less assistance when using slideboard. Max motivation and encouragement to use slideboard and pt agreeable to use after discussion on possible benefits. Cont with PT POC per pt's tolerance.     Rehab Prognosis: Good; patient would benefit from acute skilled PT services to address these deficits and reach maximum level of function.    Recent Surgery: Procedure(s) (LRB):  AMPUTATION, BELOW KNEE (Right) 6 Days Post-Op    Plan:     During this hospitalization, patient to be seen 5 x/week to address the identified rehab impairments via gait training,therapeutic activities,therapeutic exercises,neuromuscular re-education,wheelchair management/training and progress toward the following goals:    · Plan of Care Expires:  01/29/22    Subjective     Chief Complaint: Pain near incision site   Patient/Family Comments/goals: Decrease pain  Pain/Comfort:  · Pain Rating 1: 6/10  · Location - Side 1: Right  · Location 1: leg  · Pain Addressed 1: Reposition,Distraction,Cessation of Activity  · Pain Rating Post-Intervention 1: 6/10      Objective:     Communicated with nurse prior to session.  Patient found HOB elevated with bed alarm,peripheral IV,PureWick upon PT entry to  room.     General Precautions: Standard, fall   Orthopedic Precautions:N/A   Braces: N/A  Respiratory Status: Room air     Functional Mobility:  · Bed Mobility:     · Scooting: maximal assistance for R residual limb  · Supine to Sit: maximal assistance R residual limb  · Transfers:     · Bed to Chair: MaxA x 1 and Venecia x 1 with  slide board  using  Slide Board and Scoot Pivot      AM-PAC 6 CLICK MOBILITY  Turning over in bed (including adjusting bedclothes, sheets and blankets)?: 2  Sitting down on and standing up from a chair with arms (e.g., wheelchair, bedside commode, etc.): 1  Moving from lying on back to sitting on the side of the bed?: 2  Moving to and from a bed to a chair (including a wheelchair)?: 2  Need to walk in hospital room?: 1  Climbing 3-5 steps with a railing?: 1  Basic Mobility Total Score: 9       Therapeutic Activities and Exercises:   Pt agreeable to bed > chair tf.  Pt transferred supine > sit with HOB elevated and pushing through B UE on bed rails and MaxA for RLE.  Used bed pad to assist with scooting R hip forward EOB.  Pt demo minimal active movement of RLE.   Pt transferred to WC as reported above.  RLE elevated on pillow with chair positioned in front to promote R knee extension and prevent flexion contracture.    Patient left up in chair with all lines intact, call button in reach, chair alarm on and nsg notified..    GOALS:   Multidisciplinary Problems     Physical Therapy Goals        Problem: Physical Therapy Goal    Goal Priority Disciplines Outcome Goal Variances Interventions   Physical Therapy Goal     PT, PT/OT Ongoing, Progressing     Description: Goals to be met by: 22     Patient will increase functional independence with mobility by performin. Supine to sit with Minimal Assistance  2. Sit to supine with Minimal Assistance  3. Rolling to Left and Right with Stand-by Assistance.  4. Bed to WC transfer with Minimal Assistance using Slideboard.                      Time Tracking:     PT Received On: 01/03/22  PT Start Time: 1150     PT Stop Time: 1217  PT Total Time (min): 27 min     Billable Minutes: Therapeutic Activity 14 (cotx with OT)    Treatment Type: Treatment  PT/PTA: PT     PTA Visit Number: 0     01/03/2022

## 2022-01-03 NOTE — PROGRESS NOTES
[facility]  Telemedicine Progress Note    Patient Name: Suyapa Connelly  MRN: [id number]  Patient Class: IP- Inpatient   Admission Date: 12/21/2021  Length of Stay: 12 days  Attending Physician: Ruben Flood MD  Primary Care Provider: Magen Christensen MD          Subjective:     Principal Problem:S/P BKA (below knee amputation) unilateral, right        HPI:  Suyapa Connelly is a 57 yo female with a pmh of DM2, PVD, osteomyelitis, right AKA, CKD3, CAD s/p CABG, afib on eliquis. She presented with right foot pain x 1 month. She has an ulcer to her right heel. Presented with dressing to right heel which was removed and had purulent foul smelling drainage noted. The dressing was not changed in 2 months since she last saw Dr. Askew on 10/22/21 for arthrectomy, thrombectomy, and PTA with DCB to right lower extremity. Had not gone to follow up appts. She now has what appears to be dry gangrene to right toes with bone exposure. She has not been compliant with medications and states she had not taken eliquis in over a month. She states she has not been able to stand due to right leg weakness since her left AKA in May 2021. She also has a pressure wound to the lateral right lower leg. She takes her 's percocet for pain, which was helping some.       Overview/Hospital Course:  Patient admitted with osteomyelitis of RLE and gangrene, ID and podiatry consulted and patient was started on IV vancomycin and zosyn. Podiatry recommended general surgery consult for BKA, which was done. Patient continued to have leukocytosis so remains on IV antibiotics. PT/OT recommending rehab. ID re-consulted on 1/2 due to persistent leukocytosis and repeat blood cultures, CXR, and U/A ordered. Continuing IV zosyn because patient is allergic to ciprofloxacin - appreciate ID recs.      Interval History: Patient's ROSLYN slowly improving, giving additional IVF today, Hb up to 8.1 today. ID re-consulted due to  persistent leukocytosis - continuing IV vancomycin, unable to de-escalate from zosyn to ciprofloxacin as patient is allergic (allergy confirmed). Repeat blood cultures, U/A, and CXR ordered.     Review of Systems   Constitutional: Negative for chills and fever.   Respiratory: Negative for cough and shortness of breath.    Cardiovascular: Negative for chest pain and leg swelling.   Gastrointestinal: Negative for abdominal pain and nausea.   Genitourinary: Negative for difficulty urinating.   Musculoskeletal: Positive for arthralgias and myalgias.        Right leg pain   Skin: Positive for wound.   Neurological: Positive for weakness.   Psychiatric/Behavioral: Positive for dysphoric mood.        Depressed     Objective:     Vital Signs (Most Recent):  Temp: 98.3 °F (36.8 °C) (01/02/22 1611)  Pulse: 99 (01/02/22 1611)  Resp: 20 (01/02/22 1642)  BP: (!) 147/70 (01/02/22 1611)  SpO2: 99 % (01/02/22 1611) Vital Signs (24h Range):  Temp:  [98 °F (36.7 °C)-99.1 °F (37.3 °C)] 98.3 °F (36.8 °C)  Pulse:  [] 99  Resp:  [16-20] 20  SpO2:  [94 %-100 %] 99 %  BP: ()/(58-70) 147/70     Weight: 79.2 kg (174 lb 9.7 oz)  Body mass index is 29.06 kg/m².    Intake/Output Summary (Last 24 hours) at 1/2/2022 1915  Last data filed at 1/2/2022 0005  Gross per 24 hour   Intake 230 ml   Output --   Net 230 ml      Physical Exam  Vitals and nursing note reviewed.   Constitutional:       General: She is not in acute distress.     Appearance: She is obese.   Cardiovascular:      Rate and Rhythm: Normal rate.   Pulmonary:      Effort: Pulmonary effort is normal. No respiratory distress.   Musculoskeletal:      Cervical back: Normal range of motion.      Comments: Bilateral leg amputations   Skin:     General: Skin is dry.      Comments: Right leg BKA   Neurological:      Mental Status: She is alert and oriented to person, place, and time.      Motor: Weakness present.   Psychiatric:         Behavior: Behavior normal.         Thought  Content: Thought content normal.      Comments: Depressed about losing leg         Significant Labs:   All pertinent labs within the past 24 hours have been reviewed.  BMP:   Recent Labs   Lab 01/02/22  0520   GLU 80   *   K 3.7      CO2 20*   BUN 49*   CREATININE 1.7*   CALCIUM 7.8*     CBC:   Recent Labs   Lab 01/01/22  0653 01/02/22  0520   WBC 17.11* 17.96*   HGB 7.0* 8.1*   HCT 23.1* 26.0*   * 575*       Significant Imaging: I have reviewed all pertinent imaging results/findings within the past 24 hours.      Assessment/Plan:      * S/P BKA (below knee amputation) unilateral, right  Gangrene  Osteomyelitis  Diabetic Foot ulcer  PVD    - Dry gangrene right foot, osteo on MRI on presentation  - started on vanc, flagyl, and cefepime  - eliquis held  - ID, cardiology, and general surgery consulted  - BKA on 12/28  - antibiotics stopped post BKA per ID recs  - pain meds adjusted  - nutritional supplements added for wound healing  - PT/OT consulted  - discussed with general surgery, reports wound looks good post operatively  - restart vanc and zosyn   - lactic and blood cultures ordered--NGTD  - WBC uptrending, ID re-consulted 1/2/22  - IPR if remains stable       Osteomyelitis of right foot  See s/p BKA      Diabetic ulcer of right foot associated with type 2 diabetes mellitus  See s/p BKA      Cellulitis  See S/p BKA        Stage 3 chronic kidney disease due to type 2 diabetes mellitus  - slightly worsening, U/A and urine lytes ordered  - Renally dose meds      Chronic diastolic heart failure  - hold lasix due to hypotension  - denies SOB on room air  - no hypoxia noted      Chronic anemia  - Hgb drop from 13 to 7 over 2 month period  - Type and screen  - Hold plavix and eliquis (had not been taking)  - Denies acute blood loss  - s/p 1 U PRBC 12/23 and again 1/1      Hypokalemia  replete      Critical lower limb ischemia  - Had intervention to RLE in October  - patient non-compliant with  medications, wound care, and follow up appts  - Persistent non-healing diabetic right foot wounds   - Arterial US showing stenosis   - Cardiology following  - BKA on 12/28/21              Paroxysmal atrial fibrillation  - NSR on tele  - Noncompliant with eliquis  - Cont to hold for now s/p RLE amptutation      Type 2 diabetes mellitus with hyperglycemia, with long-term current use of insulin  - Hold home meds  - Detemir 5u nightly  - Accuchecks and ISS  - A1C 9.6        MDD (major depressive disorder), recurrent episode, moderate  Expresses depression and anger about losing both of her legs this year despite being compliant and doing everything asked of her. (She presented with gangrenous foot after not changing her wound dressing, not attending any follow up appts, and not taking medications for months)  Consult psych for eval, Appreciate recs  increase remeron to 15mg at night  Start zoloft 50mg daily  D/c lexapro      Essential hypertension  - stop amlodipine 5mg daily for now due to soft blood pressures, resume as able  - Monitor pressures  - given IVF on 1/1 and 1/2 due to soft BP      Gangrene  See S/p right BKA          VTE Risk Mitigation (From admission, onward)         Ordered     Reason for No Pharmacological VTE Prophylaxis  Once        Question:  Reasons:  Answer:  Already adequately anticoagulated on oral Anticoagulants    12/21/21 1910     IP VTE HIGH RISK PATIENT  Once         12/21/21 1910                      I have assessed these finding virtually using telemed platform and with assistance of bedside nurse         The attending portion of this evaluation, treatment, and documentation was performed per Ruben Flood MD via Telemedicine AudioVisual using the secure Glider software platform with 2 way audio/video. The provider was located off-site and the patient is located in the hospital. The aforementioned video software was utilized to document the relevant history and physical  exam    Ruben Flood MD  Department of Hospital Medicine   [place] [facility]

## 2022-01-03 NOTE — PROGRESS NOTES
Pharmacokinetic Assessment Follow Up: IV Vancomycin    Vancomycin serum concentration assessment(s):    The random level was drawn correctly and can be used to guide therapy at this time. The measurement is within the desired definitive target range of 15 to 20 mcg/mL.    Vancomycin Regimen Plan:    Re-dose when the random level is less than 20 mcg/mL, next level to be drawn at 0400 on 1/4    Drug levels (last 3 results):  Recent Labs   Lab Result Units 01/01/22  0653 01/02/22  0520 01/03/22  0519   Vancomycin, Random ug/mL 19.4 15.2 14.7       Pharmacy will continue to follow and monitor vancomycin.    Please contact pharmacy at extension [phone] for questions regarding this assessment.    Thank you for the consult,   Teofilo Mora       Patient brief summary:  Suyapa Connelly is a 56 y.o. female initiated on antimicrobial therapy with IV Vancomycin for treatment of bone/joint infection    The patient's current regimen is vancomycin dose by level    Drug Allergies:   Review of patient's allergies indicates:   Allergen Reactions    Ciprofloxacin Itching    Contrast media      Kidney injury    Iodine      Kidney injury       Actual Body Weight:   79.2kg    Renal Function:   Estimated Creatinine Clearance: 36.7 mL/min (A) (based on SCr of 1.8 mg/dL (H)).,     Dialysis Method (if applicable):      CBC (last 72 hours):  Recent Labs   Lab Result Units 01/01/22  0653 01/02/22  0520 01/03/22  0519   WBC K/uL 17.11* 17.96* 14.90*   Hemoglobin g/dL 7.0* 8.1* 8.0*   Hematocrit % 23.1* 26.0* 26.4*   Platelets K/uL 532* 575* 570*   Gran % % 78.1* 78.7* 75.8*   Lymph % % 9.2* 8.9* 10.1*   Mono % % 10.3 9.8 11.3   Eosinophil % % 1.3 1.3 1.7   Basophil % % 0.5 0.5 0.4   Differential Method  Automated Automated Automated       Metabolic Panel (last 72 hours):  Recent Labs   Lab Result Units 01/01/22  0653 01/02/22  0520 01/03/22  0519   Sodium mmol/L 134* 134* 134*   Potassium mmol/L 3.6 3.7 4.0   Chloride mmol/L 104 104 105   CO2  mmol/L 21* 20* 18*   Glucose mg/dL 103 80 112*   BUN mg/dL 41* 49* 49*   Creatinine mg/dL 1.8* 1.7* 1.8*   Albumin g/dL 1.1* 1.2*  --    Magnesium mg/dL  --   --  2.1   Phosphorus mg/dL 3.2 3.3 3.3       Vancomycin Administrations:  vancomycin given in the last 96 hours                     vancomycin 500 mg in dextrose 5 % 100 mL IVPB (ready to mix system) (mg) 500 mg New Bag 01/02/22 0815    vancomycin 500 mg in dextrose 5 % 100 mL IVPB (ready to mix system) (mg) 500 mg New Bag 12/31/21 0819                    Microbiologic Results:  Microbiology Results (last 7 days)       Procedure Component Value Units Date/Time    Blood culture [[id number]] Collected: 01/02/22 1938    Order Status: Completed Specimen: Blood Updated: 01/03/22 0515     Blood Culture, Routine No Growth to date    Blood culture [[id number]] Collected: 01/02/22 1938    Order Status: Completed Specimen: Blood Updated: 01/03/22 0515     Blood Culture, Routine No Growth to date    Blood culture [[id number]] Collected: 12/30/21 1412    Order Status: Completed Specimen: Blood from Peripheral, Right Arm Updated: 01/02/22 2212     Blood Culture, Routine No Growth to date      No Growth to date      No Growth to date      No Growth to date    Blood culture [[id number]] Collected: 12/30/21 1412    Order Status: Completed Specimen: Blood from Peripheral, Right Hand Updated: 01/02/22 2212     Blood Culture, Routine No Growth to date      No Growth to date      No Growth to date      No Growth to date

## 2022-01-03 NOTE — ASSESSMENT & PLAN NOTE
Gangrene  Osteomyelitis  Diabetic Foot ulcer  PVD    - Dry gangrene right foot, osteo on MRI on presentation  - started on vanc, flagyl, and cefepime  - eliquis held  - ID, cardiology, and general surgery consulted  - BKA on 12/28  - antibiotics stopped post BKA per ID recs  - pain meds adjusted  - nutritional supplements added for wound healing  - PT/OT consulted  - discussed with general surgery, reports wound looks good post operatively  - restart vanc and zosyn   - lactic and blood cultures ordered--NGTD  - WBC uptrending, ID re-consulted 1/2/22  - IPR if remains stable

## 2022-01-03 NOTE — PLAN OF CARE
01/03/22 1308   Post-Acute Status   Post-Acute Authorization Placement   Post-Acute Placement Status Pending medical clearance/testing  ([facility] following for medical stability)   Discharge Plan   Discharge Plan A Rehab

## 2022-01-04 ENCOUNTER — PATIENT OUTREACH (OUTPATIENT)
Dept: ADMINISTRATIVE | Facility: OTHER | Age: 57
End: 2022-01-04
Payer: MEDICARE

## 2022-01-04 LAB
ALBUMIN SERPL BCP-MCNC: 1.3 G/DL (ref 3.5–5.2)
ANION GAP SERPL CALC-SCNC: 10 MMOL/L (ref 8–16)
BACTERIA BLD CULT: NORMAL
BACTERIA BLD CULT: NORMAL
BASOPHILS # BLD AUTO: 0.09 K/UL (ref 0–0.2)
BASOPHILS NFR BLD: 0.6 % (ref 0–1.9)
BUN SERPL-MCNC: 46 MG/DL (ref 6–20)
CALCIUM SERPL-MCNC: 8 MG/DL (ref 8.7–10.5)
CHLORIDE SERPL-SCNC: 106 MMOL/L (ref 95–110)
CO2 SERPL-SCNC: 20 MMOL/L (ref 23–29)
CREAT SERPL-MCNC: 1.4 MG/DL (ref 0.5–1.4)
DIFFERENTIAL METHOD: ABNORMAL
EOSINOPHIL # BLD AUTO: 0.2 K/UL (ref 0–0.5)
EOSINOPHIL NFR BLD: 1.5 % (ref 0–8)
ERYTHROCYTE [DISTWIDTH] IN BLOOD BY AUTOMATED COUNT: 14.1 % (ref 11.5–14.5)
EST. GFR  (AFRICAN AMERICAN): 49 ML/MIN/1.73 M^2
EST. GFR  (NON AFRICAN AMERICAN): 42 ML/MIN/1.73 M^2
GLUCOSE SERPL-MCNC: 172 MG/DL (ref 70–110)
HCT VFR BLD AUTO: 28.4 % (ref 37–48.5)
HGB BLD-MCNC: 8.7 G/DL (ref 12–16)
IMM GRANULOCYTES # BLD AUTO: 0.13 K/UL (ref 0–0.04)
IMM GRANULOCYTES NFR BLD AUTO: 0.8 % (ref 0–0.5)
LYMPHOCYTES # BLD AUTO: 1.2 K/UL (ref 1–4.8)
LYMPHOCYTES NFR BLD: 7.7 % (ref 18–48)
MCH RBC QN AUTO: 30.4 PG (ref 27–31)
MCHC RBC AUTO-ENTMCNC: 30.6 G/DL (ref 32–36)
MCV RBC AUTO: 99 FL (ref 82–98)
MONOCYTES # BLD AUTO: 1.3 K/UL (ref 0.3–1)
MONOCYTES NFR BLD: 8.3 % (ref 4–15)
NEUTROPHILS # BLD AUTO: 12.8 K/UL (ref 1.8–7.7)
NEUTROPHILS NFR BLD: 81.1 % (ref 38–73)
NRBC BLD-RTO: 0 /100 WBC
PHOSPHATE SERPL-MCNC: 3.4 MG/DL (ref 2.7–4.5)
PLATELET # BLD AUTO: 572 K/UL (ref 150–450)
PMV BLD AUTO: 11.2 FL (ref 9.2–12.9)
POCT GLUCOSE: 131 MG/DL (ref 70–110)
POCT GLUCOSE: 236 MG/DL (ref 70–110)
POCT GLUCOSE: 280 MG/DL (ref 70–110)
POCT GLUCOSE: 96 MG/DL (ref 70–110)
POTASSIUM SERPL-SCNC: 3.8 MMOL/L (ref 3.5–5.1)
RBC # BLD AUTO: 2.86 M/UL (ref 4–5.4)
SODIUM SERPL-SCNC: 136 MMOL/L (ref 136–145)
WBC # BLD AUTO: 15.74 K/UL (ref 3.9–12.7)

## 2022-01-04 PROCEDURE — 25000003 PHARM REV CODE 250: Performed by: PSYCHIATRY & NEUROLOGY

## 2022-01-04 PROCEDURE — 80069 RENAL FUNCTION PANEL: CPT | Performed by: STUDENT IN AN ORGANIZED HEALTH CARE EDUCATION/TRAINING PROGRAM

## 2022-01-04 PROCEDURE — 25000003 PHARM REV CODE 250: Performed by: STUDENT IN AN ORGANIZED HEALTH CARE EDUCATION/TRAINING PROGRAM

## 2022-01-04 PROCEDURE — 27000207 HC ISOLATION

## 2022-01-04 PROCEDURE — 25000003 PHARM REV CODE 250: Performed by: SURGERY

## 2022-01-04 PROCEDURE — 63600175 PHARM REV CODE 636 W HCPCS: Performed by: NURSE PRACTITIONER

## 2022-01-04 PROCEDURE — 25000003 PHARM REV CODE 250: Performed by: NURSE PRACTITIONER

## 2022-01-04 PROCEDURE — 85025 COMPLETE CBC W/AUTO DIFF WBC: CPT | Performed by: STUDENT IN AN ORGANIZED HEALTH CARE EDUCATION/TRAINING PROGRAM

## 2022-01-04 PROCEDURE — 36415 COLL VENOUS BLD VENIPUNCTURE: CPT | Performed by: STUDENT IN AN ORGANIZED HEALTH CARE EDUCATION/TRAINING PROGRAM

## 2022-01-04 PROCEDURE — 11000001 HC ACUTE MED/SURG PRIVATE ROOM

## 2022-01-04 RX ADMIN — AMOXICILLIN AND CLAVULANATE POTASSIUM 1 TABLET: 875; 125 TABLET, FILM COATED ORAL at 08:01

## 2022-01-04 RX ADMIN — OXYCODONE AND ACETAMINOPHEN 1 TABLET: 10; 325 TABLET ORAL at 09:01

## 2022-01-04 RX ADMIN — PREGABALIN 50 MG: 50 CAPSULE ORAL at 08:01

## 2022-01-04 RX ADMIN — DOCUSATE SODIUM AND SENNOSIDES 1 TABLET: 8.6; 5 TABLET, FILM COATED ORAL at 08:01

## 2022-01-04 RX ADMIN — MIRTAZAPINE 15 MG: 7.5 TABLET ORAL at 08:01

## 2022-01-04 RX ADMIN — PREGABALIN 50 MG: 50 CAPSULE ORAL at 04:01

## 2022-01-04 RX ADMIN — ALLOPURINOL 100 MG: 100 TABLET ORAL at 08:01

## 2022-01-04 RX ADMIN — INSULIN ASPART 1 UNITS: 100 INJECTION, SOLUTION INTRAVENOUS; SUBCUTANEOUS at 09:01

## 2022-01-04 RX ADMIN — OXYCODONE AND ACETAMINOPHEN 1 TABLET: 10; 325 TABLET ORAL at 12:01

## 2022-01-04 RX ADMIN — INSULIN DETEMIR 5 UNITS: 100 INJECTION, SOLUTION SUBCUTANEOUS at 09:01

## 2022-01-04 RX ADMIN — MORPHINE SULFATE 2 MG: 2 INJECTION, SOLUTION INTRAMUSCULAR; INTRAVENOUS at 09:01

## 2022-01-04 RX ADMIN — OXYCODONE AND ACETAMINOPHEN 1 TABLET: 10; 325 TABLET ORAL at 08:01

## 2022-01-04 RX ADMIN — ATORVASTATIN CALCIUM 80 MG: 40 TABLET, FILM COATED ORAL at 08:01

## 2022-01-04 RX ADMIN — SERTRALINE HYDROCHLORIDE 50 MG: 50 TABLET ORAL at 08:01

## 2022-01-04 RX ADMIN — OXYCODONE AND ACETAMINOPHEN 1 TABLET: 10; 325 TABLET ORAL at 04:01

## 2022-01-04 RX ADMIN — INSULIN ASPART 2 UNITS: 100 INJECTION, SOLUTION INTRAVENOUS; SUBCUTANEOUS at 04:01

## 2022-01-04 NOTE — PLAN OF CARE
Problem: Diabetes Comorbidity  Goal: Blood Glucose Level Within Targeted Range  Outcome: Accuchecks monitored and insulin administered as ordered     Problem: Skin Injury Risk Increased  Goal: Skin Health and Integrity  Outcome: Patient turned q2 hours throughout shift

## 2022-01-04 NOTE — PLAN OF CARE
01/04/22 1235   Post-Acute Status   Post-Acute Authorization Placement   Post-Acute Placement Status Pending medical clearance/testing  (Awaiting for improvement of pt's renal function for transfer to [facility])   Discharge Plan   Discharge Plan A Rehab

## 2022-01-04 NOTE — PLAN OF CARE
Plan of care reviewed, pt verbalizes understanding.  AAOX4, NSR on tele monitor, On room air.  Due medications administered per order.  Pain medications administered.  Blood Glucose monitoring, insulin administered per orders.  Patient safety maintained, bed in lowest position, call bell within patient reach, bed alarm set.  Will continue to monitor.

## 2022-01-04 NOTE — PT/OT/SLP PROGRESS
Physical Therapy Treatment Attempt      Patient Name:  Suyapa Connelly   MRN:  [id number]    1016 Attempt. Patient not seen today secondary to Other (Comment). Pt reporting 10/10 pain in RLE and pt with decreased alertness 2/2 recently receiving medications. Unable to perform meaningful therapeutic activities/exercises 2/2 increased drowsiness and pain. Pillow under RLE re-positioned to promote R knee ext / prevent flexion contracture. Nsg notified of loose bandage / ACE wrap. Will follow-up as able.    1/4/2022

## 2022-01-04 NOTE — PLAN OF CARE
Per Dr. Flood, pt may be ready for d/c to [facility] tomorrow. Per Vicki at [facility], pt has to work with PT/OT tomorrow prior admission to [facility] due to refusing PT/OT today due to being excessively drowsy.

## 2022-01-04 NOTE — PROGRESS NOTES
[facility] Medicine  Telemedicine Progress Note    Patient Name: Suyapa Connelly  MRN: [id number]  Patient Class: IP- Inpatient   Admission Date: 12/21/2021  Length of Stay: 13 days  Attending Physician: Ruben Flood MD  Primary Care Provider: Magen Christensen MD          Subjective:     Principal Problem:S/P BKA (below knee amputation) unilateral, right        HPI:  Suyapa Connelly is a 55 yo female with a pmh of DM2, PVD, osteomyelitis, right AKA, CKD3, CAD s/p CABG, afib on eliquis. She presented with right foot pain x 1 month. She has an ulcer to her right heel. Presented with dressing to right heel which was removed and had purulent foul smelling drainage noted. The dressing was not changed in 2 months since she last saw Dr. Askew on 10/22/21 for arthrectomy, thrombectomy, and PTA with DCB to right lower extremity. Had not gone to follow up appts. She now has what appears to be dry gangrene to right toes with bone exposure. She has not been compliant with medications and states she had not taken eliquis in over a month. She states she has not been able to stand due to right leg weakness since her left AKA in May 2021. She also has a pressure wound to the lateral right lower leg. She takes her 's percocet for pain, which was helping some.       Overview/Hospital Course:  Patient admitted with osteomyelitis of RLE and gangrene, ID and podiatry consulted and patient was started on IV vancomycin and zosyn. Podiatry recommended general surgery consult for BKA, which was done. Patient continued to have leukocytosis so remains on IV antibiotics. PT/OT recommending rehab. ID re-consulted on 1/2 due to persistent leukocytosis and repeat blood cultures, CXR, and U/A ordered. Continuing IV zosyn because patient is allergic to ciprofloxacin - appreciate ID recs.De-escalated to PO augmentin on 1/3/22      Interval History: Hb remain s stable, per ID recs, abx de-escalated to PO  augmentin. Still with ROSLYN, US without obstruction, awaiting U/A.     Review of Systems   Constitutional: Negative for chills and fever.   Respiratory: Negative for cough and shortness of breath.    Cardiovascular: Negative for chest pain and leg swelling.   Gastrointestinal: Negative for abdominal pain and nausea.   Genitourinary: Negative for difficulty urinating.   Musculoskeletal: Positive for arthralgias and myalgias.        Right leg pain   Skin: Positive for wound.   Neurological: Positive for weakness.   Psychiatric/Behavioral: Positive for dysphoric mood.        Depressed     Objective:     Vital Signs (Most Recent):  Temp: 98.1 °F (36.7 °C) (01/03/22 2020)  Pulse: 92 (01/03/22 2020)  Resp: 16 (01/03/22 2020)  BP: 133/73 (01/03/22 2020)  SpO2: 96 % (01/03/22 2020) Vital Signs (24h Range):  Temp:  [96.6 °F (35.9 °C)-98.6 °F (37 °C)] 98.1 °F (36.7 °C)  Pulse:  [] 92  Resp:  [16-20] 16  SpO2:  [95 %-100 %] 96 %  BP: (111-161)/() 133/73     Weight: 79.2 kg (174 lb 9.7 oz)  Body mass index is 29.06 kg/m².    Intake/Output Summary (Last 24 hours) at 1/3/2022 2029  Last data filed at 1/3/2022 0600  Gross per 24 hour   Intake 0 ml   Output 0 ml   Net 0 ml      Physical Exam  Vitals and nursing note reviewed.   Constitutional:       General: She is not in acute distress.     Appearance: She is obese.   Cardiovascular:      Rate and Rhythm: Normal rate.   Pulmonary:      Effort: Pulmonary effort is normal. No respiratory distress.   Musculoskeletal:      Cervical back: Normal range of motion.      Comments: Bilateral leg amputations   Skin:     General: Skin is dry.      Comments: Right leg BKA   Neurological:      Mental Status: She is alert and oriented to person, place, and time.      Motor: Weakness present.   Psychiatric:         Behavior: Behavior normal.         Thought Content: Thought content normal.      Comments: Depressed about losing leg         Significant Labs:   All pertinent labs within  the past 24 hours have been reviewed.  BMP:   Recent Labs   Lab 01/03/22  0519   *   *   K 4.0      CO2 18*   BUN 49*   CREATININE 1.8*   CALCIUM 7.4*   MG 2.1       Significant Imaging: I have reviewed all pertinent imaging results/findings within the past 24 hours.      Assessment/Plan:      * S/P BKA (below knee amputation) unilateral, right  Gangrene  Osteomyelitis  Diabetic Foot ulcer  PVD    - Dry gangrene right foot, osteo on MRI on presentation  - started on vanc, flagyl, and cefepime  - eliquis held  - ID, cardiology, and general surgery consulted  - BKA on 12/28  - antibiotics stopped post BKA per ID recs  - pain meds adjusted  - nutritional supplements added for wound healing  - PT/OT consulted  - discussed with general surgery, reports wound looks good post operatively  - restart vanc and zosyn   - lactic and blood cultures ordered--NGTD  - WBC uptrending, ID re-consulted 1/2/22  - IPR if remains stable       Osteomyelitis of right foot  See s/p BKA      Diabetic ulcer of right foot associated with type 2 diabetes mellitus  See s/p BKA      Cellulitis  See S/p BKA        Stage 3 chronic kidney disease due to type 2 diabetes mellitus  - slightly worsening, U/A and urine lytes ordered  - Renally dose meds      Chronic diastolic heart failure  - hold lasix due to hypotension  - denies SOB on room air  - no hypoxia noted      Chronic anemia  - Hgb drop from 13 to 7 over 2 month period  - Type and screen  - Hold plavix and eliquis (had not been taking)  - Denies acute blood loss  - s/p 1 U PRBC 12/23 and again 1/1      Hypokalemia  replete      Critical lower limb ischemia  - Had intervention to RLE in October  - patient non-compliant with medications, wound care, and follow up appts  - Persistent non-healing diabetic right foot wounds   - Arterial US showing stenosis   - Cardiology following  - BKA on 12/28/21              Paroxysmal atrial fibrillation  - NSR on tele  - Noncompliant with  eliquis  - Cont to hold for now s/p RLE amptutation      Type 2 diabetes mellitus with hyperglycemia, with long-term current use of insulin  - Hold home meds  - Detemir 5u nightly  - Accuchecks and ISS  - A1C 9.6        MDD (major depressive disorder), recurrent episode, moderate  Expresses depression and anger about losing both of her legs this year despite being compliant and doing everything asked of her. (She presented with gangrenous foot after not changing her wound dressing, not attending any follow up appts, and not taking medications for months)  Consult psych for eval, Appreciate recs  increase remeron to 15mg at night  Start zoloft 50mg daily  D/c lexapro      Essential hypertension  - stop amlodipine 5mg daily for now due to soft blood pressures, resume as able  - Monitor pressures  - given IVF on 1/1 and 1/2 due to soft BP      Gangrene  See S/p right BKA          VTE Risk Mitigation (From admission, onward)         Ordered     Reason for No Pharmacological VTE Prophylaxis  Once        Question:  Reasons:  Answer:  Already adequately anticoagulated on oral Anticoagulants    12/21/21 1910     IP VTE HIGH RISK PATIENT  Once         12/21/21 1910                      I have assessed these finding virtually using telemed platform and with assistance of bedside nurse           The attending portion of this evaluation, treatment, and documentation was performed per Ruben Flood MD via Telemedicine AudioVisual using the secure [employer] software platform with 2 way audio/video. The provider was located off-site and the patient is located in the hospital. The aforementioned video software was utilized to document the relevant history and physical exam    Ruben Flood MD  Department of Hospital Medicine   [place] [facility]

## 2022-01-04 NOTE — SUBJECTIVE & OBJECTIVE
Interval History: Hb remain s stable, per ID recs, abx de-escalated to PO augmentin. Still with ROSLYN, US without obstruction, awaiting U/A.     Review of Systems   Constitutional: Negative for chills and fever.   Respiratory: Negative for cough and shortness of breath.    Cardiovascular: Negative for chest pain and leg swelling.   Gastrointestinal: Negative for abdominal pain and nausea.   Genitourinary: Negative for difficulty urinating.   Musculoskeletal: Positive for arthralgias and myalgias.        Right leg pain   Skin: Positive for wound.   Neurological: Positive for weakness.   Psychiatric/Behavioral: Positive for dysphoric mood.        Depressed     Objective:     Vital Signs (Most Recent):  Temp: 98.1 °F (36.7 °C) (01/03/22 2020)  Pulse: 92 (01/03/22 2020)  Resp: 16 (01/03/22 2020)  BP: 133/73 (01/03/22 2020)  SpO2: 96 % (01/03/22 2020) Vital Signs (24h Range):  Temp:  [96.6 °F (35.9 °C)-98.6 °F (37 °C)] 98.1 °F (36.7 °C)  Pulse:  [] 92  Resp:  [16-20] 16  SpO2:  [95 %-100 %] 96 %  BP: (111-161)/() 133/73     Weight: 79.2 kg (174 lb 9.7 oz)  Body mass index is 29.06 kg/m².    Intake/Output Summary (Last 24 hours) at 1/3/2022 2029  Last data filed at 1/3/2022 0600  Gross per 24 hour   Intake 0 ml   Output 0 ml   Net 0 ml      Physical Exam  Vitals and nursing note reviewed.   Constitutional:       General: She is not in acute distress.     Appearance: She is obese.   Cardiovascular:      Rate and Rhythm: Normal rate.   Pulmonary:      Effort: Pulmonary effort is normal. No respiratory distress.   Musculoskeletal:      Cervical back: Normal range of motion.      Comments: Bilateral leg amputations   Skin:     General: Skin is dry.      Comments: Right leg BKA   Neurological:      Mental Status: She is alert and oriented to person, place, and time.      Motor: Weakness present.   Psychiatric:         Behavior: Behavior normal.         Thought Content: Thought content normal.      Comments: Depressed  about losing leg         Significant Labs:   All pertinent labs within the past 24 hours have been reviewed.  BMP:   Recent Labs   Lab 01/03/22  0519   *   *   K 4.0      CO2 18*   BUN 49*   CREATININE 1.8*   CALCIUM 7.4*   MG 2.1       Significant Imaging: I have reviewed all pertinent imaging results/findings within the past 24 hours.

## 2022-01-04 NOTE — PROGRESS NOTES
"Surgery follow up  BP (!) 103/58 (BP Location: Left arm, Patient Position: Lying)   Pulse 106   Temp 98.7 °F (37.1 °C) (Oral)   Resp 16   Ht 5' 5" (1.651 m)   Wt 79.2 kg (174 lb 9.7 oz)   LMP 09/30/2015 (Exact Date)   SpO2 (!) 94%   BMI 29.06 kg/m²   No intake/output data recorded.  No intake/output data recorded.  Recent Results (from the past 336 hour(s))   CBC Auto Differential    Collection Time: 01/04/22 12:38 PM   Result Value Ref Range    WBC 15.74 (H) 3.90 - 12.70 K/uL    Hemoglobin 8.7 (L) 12.0 - 16.0 g/dL    Hematocrit 28.4 (L) 37.0 - 48.5 %    Platelets 572 (H) 150 - 450 K/uL   CBC auto differential    Collection Time: 01/03/22  5:19 AM   Result Value Ref Range    WBC 14.90 (H) 3.90 - 12.70 K/uL    Hemoglobin 8.0 (L) 12.0 - 16.0 g/dL    Hematocrit 26.4 (L) 37.0 - 48.5 %    Platelets 570 (H) 150 - 450 K/uL   CBC Auto Differential    Collection Time: 01/02/22  5:20 AM   Result Value Ref Range    WBC 17.96 (H) 3.90 - 12.70 K/uL    Hemoglobin 8.1 (L) 12.0 - 16.0 g/dL    Hematocrit 26.0 (L) 37.0 - 48.5 %    Platelets 575 (H) 150 - 450 K/uL   still c/o pain right BKA stump,   Wbc increased will need continued IV antibiotics.and local wound care.  "

## 2022-01-04 NOTE — PROGRESS NOTES
Health Maintenance Due   Topic Date Due    Pneumococcal Vaccines (Age 0-64) (1 of 4 - PCV13) Never done    Eye Exam  Never done    Mammogram  Never done    Cervical Cancer Screening  Never done    Diabetes Urine Screening  01/05/2021    Colorectal Cancer Screening  01/07/2021    COVID-19 Vaccine (3 - Booster for Pfizer series) 09/30/2021     Updates were requested from care everywhere.  Chart was reviewed for overdue Proactive [facility] ([facility]) topics (CRS, Breast Cancer Screening, Eye exam)  Health Maintenance has been updated.  LINKS immunization registry triggered.  Immunizations were reconciled.

## 2022-01-04 NOTE — PLAN OF CARE
Upon cleaning patient, breakdown noticed at patients backside.  Applied Triad ointment and mepilex. Wedge placed to patients right side.  Instructed patient that we will be turning her every 2hrs.  Per Dr. Flood orders, pictures taken of patients breakdown.      Dressing to right lower extremity changed.

## 2022-01-04 NOTE — PT/OT/SLP PROGRESS
Occupational Therapy      Patient Name:  Suyapa Connelly   MRN:  [id number]    Patient not seen today secondary to Other (Comment) (Pt in 10/10 pain, drowsy after pain medication, & RLE bandage coming off). RLE repositioned on pillow to promote ext. Nsg notified of bandage & increased drowsiness. Will follow-up as able .    1/4/2022

## 2022-01-05 ENCOUNTER — TELEPHONE (OUTPATIENT)
Dept: CARDIOLOGY | Facility: CLINIC | Age: 57
End: 2022-01-05
Payer: MEDICARE

## 2022-01-05 LAB
POCT GLUCOSE: 213 MG/DL (ref 70–110)
POCT GLUCOSE: 233 MG/DL (ref 70–110)
POCT GLUCOSE: 264 MG/DL (ref 70–110)
POCT GLUCOSE: 268 MG/DL (ref 70–110)

## 2022-01-05 PROCEDURE — 27000207 HC ISOLATION

## 2022-01-05 PROCEDURE — 97535 SELF CARE MNGMENT TRAINING: CPT

## 2022-01-05 PROCEDURE — 63600175 PHARM REV CODE 636 W HCPCS: Performed by: NURSE PRACTITIONER

## 2022-01-05 PROCEDURE — 25000003 PHARM REV CODE 250: Performed by: STUDENT IN AN ORGANIZED HEALTH CARE EDUCATION/TRAINING PROGRAM

## 2022-01-05 PROCEDURE — 11000001 HC ACUTE MED/SURG PRIVATE ROOM

## 2022-01-05 PROCEDURE — 97530 THERAPEUTIC ACTIVITIES: CPT

## 2022-01-05 PROCEDURE — 25000003 PHARM REV CODE 250: Performed by: NURSE PRACTITIONER

## 2022-01-05 PROCEDURE — 25000003 PHARM REV CODE 250: Performed by: PSYCHIATRY & NEUROLOGY

## 2022-01-05 RX ORDER — MORPHINE SULFATE 2 MG/ML
1 INJECTION, SOLUTION INTRAMUSCULAR; INTRAVENOUS ONCE
Status: COMPLETED | OUTPATIENT
Start: 2022-01-05 | End: 2022-01-05

## 2022-01-05 RX ADMIN — MORPHINE SULFATE 1 MG: 2 INJECTION, SOLUTION INTRAMUSCULAR; INTRAVENOUS at 12:01

## 2022-01-05 RX ADMIN — SERTRALINE HYDROCHLORIDE 50 MG: 50 TABLET ORAL at 08:01

## 2022-01-05 RX ADMIN — OXYCODONE AND ACETAMINOPHEN 1 TABLET: 10; 325 TABLET ORAL at 05:01

## 2022-01-05 RX ADMIN — ATORVASTATIN CALCIUM 80 MG: 40 TABLET, FILM COATED ORAL at 09:01

## 2022-01-05 RX ADMIN — PREGABALIN 50 MG: 50 CAPSULE ORAL at 09:01

## 2022-01-05 RX ADMIN — INSULIN DETEMIR 5 UNITS: 100 INJECTION, SOLUTION SUBCUTANEOUS at 09:01

## 2022-01-05 RX ADMIN — AMOXICILLIN AND CLAVULANATE POTASSIUM 1 TABLET: 875; 125 TABLET, FILM COATED ORAL at 09:01

## 2022-01-05 RX ADMIN — MIRTAZAPINE 15 MG: 7.5 TABLET ORAL at 09:01

## 2022-01-05 RX ADMIN — AMOXICILLIN AND CLAVULANATE POTASSIUM 1 TABLET: 875; 125 TABLET, FILM COATED ORAL at 08:01

## 2022-01-05 RX ADMIN — PREGABALIN 50 MG: 50 CAPSULE ORAL at 08:01

## 2022-01-05 RX ADMIN — INSULIN ASPART 2 UNITS: 100 INJECTION, SOLUTION INTRAVENOUS; SUBCUTANEOUS at 08:01

## 2022-01-05 RX ADMIN — ALLOPURINOL 100 MG: 100 TABLET ORAL at 08:01

## 2022-01-05 RX ADMIN — OXYCODONE AND ACETAMINOPHEN 1 TABLET: 10; 325 TABLET ORAL at 01:01

## 2022-01-05 RX ADMIN — PREGABALIN 50 MG: 50 CAPSULE ORAL at 03:01

## 2022-01-05 RX ADMIN — OXYCODONE AND ACETAMINOPHEN 1 TABLET: 10; 325 TABLET ORAL at 09:01

## 2022-01-05 RX ADMIN — INSULIN ASPART 1 UNITS: 100 INJECTION, SOLUTION INTRAVENOUS; SUBCUTANEOUS at 09:01

## 2022-01-05 RX ADMIN — INSULIN ASPART 2 UNITS: 100 INJECTION, SOLUTION INTRAVENOUS; SUBCUTANEOUS at 12:01

## 2022-01-05 RX ADMIN — INSULIN ASPART 3 UNITS: 100 INJECTION, SOLUTION INTRAVENOUS; SUBCUTANEOUS at 05:01

## 2022-01-05 NOTE — NURSING
While making rounds patient informed [facility] that she did not want to be turned at 0200 to wait until 0400. Patient was educated on the importance of being turned. Patient denied telling PCT that she wanted to wait until 0400 to be turned. Patient agreed to be turned at 0200. Will continue to monitor and turn patient every two hours.

## 2022-01-05 NOTE — NURSING
Report received from Frances BRIGHT, rounded on patient with off going nurse. Patient remains stable lying in bed. No complaints of pain at this time. Will continue to monitor.

## 2022-01-05 NOTE — NURSING
Patient lying in bed resting with eyes closed. No complaints of pain at this time. Patient shows no signs of distress. Will continue to monitor and turn every two hours.

## 2022-01-05 NOTE — PLAN OF CARE
Problem: Physical Therapy Goal  Goal: Physical Therapy Goal  Description: Goals to be met by: 22     Patient will increase functional independence with mobility by performin. Supine to sit with Minimal Assistance  2. Sit to supine with Minimal Assistance  3. Rolling to Left and Right with Stand-by Assistance.  4. Bed to WC transfer with Minimal Assistance using Slideboard.    Outcome: Ongoing, Progressing     Pt is gradually making progress with bed > WC tf's with slideboard. Pt requiring Venecia x 2 initially for scooting then Mod-MaxA x 2 towards last 2-3 scoots to WC. Pt requires cueing for hand placement and sequencing using slideboard. Attempted self-propulsion in WC, however, pt unable to tolerate 2/2 tearful and expressing significant pain in RLE (unable to rate) with R residual limb in a dependent position. Pt demo fair activity tolerance. R knee positioned in extension and elevated on pillow with 2nd chair positioned in front pt. Pillow placed under pt's buttocks to provide cushion while pt sitting up in chair. Cont with PT POC.

## 2022-01-05 NOTE — NURSING
Patient lying in bed resting with eyes closed. No acute distress or pain noted at this time. Will continue to monitor.

## 2022-01-05 NOTE — SUBJECTIVE & OBJECTIVE
Interval History: sCr improving,  upset about wound care, wound care consulted. Now medically ready for d/c to rehab.     Review of Systems   Constitutional: Negative for chills and fever.   Respiratory: Negative for cough and shortness of breath.    Cardiovascular: Negative for chest pain and leg swelling.   Gastrointestinal: Negative for abdominal pain and nausea.   Genitourinary: Negative for difficulty urinating.   Musculoskeletal: Positive for arthralgias and myalgias.        Right leg pain   Skin: Positive for wound.   Neurological: Positive for weakness.   Psychiatric/Behavioral: Positive for dysphoric mood.        Depressed     Objective:     Vital Signs (Most Recent):  Temp: 98.1 °F (36.7 °C) (01/04/22 1950)  Pulse: 97 (01/04/22 2005)  Resp: 18 (01/04/22 2100)  BP: 135/68 (01/04/22 1950)  SpO2: 95 % (01/04/22 1950) Vital Signs (24h Range):  Temp:  [97.3 °F (36.3 °C)-98.9 °F (37.2 °C)] 98.1 °F (36.7 °C)  Pulse:  [] 97  Resp:  [16-18] 18  SpO2:  [94 %-99 %] 95 %  BP: (103-137)/(58-72) 135/68     Weight: 79.2 kg (174 lb 9.7 oz)  Body mass index is 29.06 kg/m².  No intake or output data in the 24 hours ending 01/04/22 2321   Physical Exam  Vitals and nursing note reviewed.   Constitutional:       General: She is not in acute distress.     Appearance: She is obese.   Cardiovascular:      Rate and Rhythm: Normal rate.   Pulmonary:      Effort: Pulmonary effort is normal. No respiratory distress.   Musculoskeletal:      Cervical back: Normal range of motion.      Comments: Bilateral leg amputations   Skin:     General: Skin is dry.      Comments: Right leg BKA   Neurological:      Mental Status: She is alert and oriented to person, place, and time.      Motor: Weakness present.   Psychiatric:         Behavior: Behavior normal.         Thought Content: Thought content normal.      Comments: Depressed about losing leg         Significant Labs:   All pertinent labs within the past 24 hours have been  reviewed.  BMP:   Recent Labs   Lab 01/03/22  0519 01/03/22  0519 01/04/22  1238   *   < > 172*   *   < > 136   K 4.0   < > 3.8      < > 106   CO2 18*   < > 20*   BUN 49*   < > 46*   CREATININE 1.8*   < > 1.4   CALCIUM 7.4*   < > 8.0*   MG 2.1  --   --     < > = values in this interval not displayed.       Significant Imaging: I have reviewed all pertinent imaging results/findings within the past 24 hours.

## 2022-01-05 NOTE — PT/OT/SLP PROGRESS
Physical Therapy Treatment    Patient Name:  Suyapa Connelly   MRN:  [id number]    Recommendations:     Discharge Recommendations:  rehabilitation facility   Discharge Equipment Recommendations: slide board (BSC with drop-arm, WC with drop-arm and elevating R leg rest)   Barriers to discharge: increased assist needed    Assessment:     Suyapa Connelly is a 56 y.o. female admitted with a medical diagnosis of S/P BKA (below knee amputation) unilateral, right.  She presents with the following impairments/functional limitations:  weakness,impaired functional mobilty,decreased safety awareness,pain,impaired endurance,impaired self care skills,decreased lower extremity function,decreased ROM,edema,impaired skin,decreased coordination .Pt is gradually making progress with bed > WC tf's with slideboard. Pt requiring Venecia x 2 initially for scooting then Mod-MaxA x 2 towards last 2-3 scoots to WC. Pt requires cueing for hand placement and sequencing using slideboard. Attempted self-propulsion in WC, however, pt unable to tolerate 2/2 tearful and expressing significant pain in RLE (unable to rate) with R residual limb in a dependent position. Pt demo fair activity tolerance. R knee positioned in extension and elevated on pillow with 2nd chair positioned in front pt. Pillow placed under pt's buttocks to provide cushion while pt sitting up in chair. Cont with PT POC.    Rehab Prognosis: Fair+; patient would benefit from acute skilled PT services to address these deficits and reach maximum level of function.    Recent Surgery: Procedure(s) (LRB):  AMPUTATION, BELOW KNEE (Right) 8 Days Post-Op    Plan:     During this hospitalization, patient to be seen 5 x/week to address the identified rehab impairments via therapeutic activities,therapeutic exercises,neuromuscular re-education,wheelchair management/training and progress toward the following goals:    · Plan of Care Expires:  01/29/22    Subjective     Chief Complaint: RLE  pain  Patient/Family Comments/goals: Pt tearful and expressing frustration with current level of function.   Pain/Comfort:  · Pain Rating 1: 6/10  · Location - Side 1: Right  · Location 1: leg  · Pain Addressed 1: Pre-medicate for activity,Reposition,Distraction,Cessation of Activity,Nurse notified  · Pain Rating Post-Intervention 1:  (did not rate but reports increased pain with mobility and when RLE is in a dependent position)      Objective:     Communicated with nurse prior to session.  Patient found left sidelying with PureWick upon PT entry to room.     General Precautions: Standard, fall   Orthopedic Precautions:N/A   Braces: N/A  Respiratory Status: Room air     Functional Mobility:  · Bed Mobility:     · Scooting: maximal assistance during seated scooting forward/backward and use of BUE on bed or armrests of WC  · Supine to Sit: moderate assistance and of 2 persons, use of bed rail, increased assist needed for RLE  · Transfers:     · Bed to WC: Min-Max A x 2 with  slide board  using  Slide Board and scooting with BUE      AM-PAC 6 CLICK MOBILITY  Turning over in bed (including adjusting bedclothes, sheets and blankets)?: 2  Sitting down on and standing up from a chair with arms (e.g., wheelchair, bedside commode, etc.): 1  Moving from lying on back to sitting on the side of the bed?: 2  Moving to and from a bed to a chair (including a wheelchair)?: 2  Need to walk in hospital room?: 1  Climbing 3-5 steps with a railing?: 1  Basic Mobility Total Score: 9       Therapeutic Activities and Exercises:   Pt agreeable to participate in therapy session.   Attempted seated LE exercises EOB, but pt was unable to tolerate 2/2 pain.  Pt initially resistant to using slideboard for transfer and stating she would like to use a pillow.  Time spend educating pt on potential risks of not using slideboard as well as benefits such as increased efficiency, independence, and safety.   Pt willing to use after discussion and max  encouragement and education provided.   Pt requiring Venecia x 2 initially for scooting then Mod-MaxA x 2 towards last 2-3 scoots to WC.   Pt requires cueing for hand placement and sequencing using slideboard. Attempted self-propulsion in WC, however, pt unable to tolerate 2/2 tearful and expressing significant pain in RLE (unable to rate) with R residual limb in a dependent position.    R knee positioned in extension (to prevent flexion contracture) and elevated on pillow with 2nd chair positioned in front pt.   Pillow placed under pt's buttocks to provide cushion while pt sitting up in chair.     Patient left up in chair with all lines intact, call button in reach, chair alarm on and nsg and PCT present..    GOALS:   Multidisciplinary Problems     Physical Therapy Goals        Problem: Physical Therapy Goal    Goal Priority Disciplines Outcome Goal Variances Interventions   Physical Therapy Goal     PT, PT/OT Ongoing, Progressing     Description: Goals to be met by: 22     Patient will increase functional independence with mobility by performin. Supine to sit with Minimal Assistance  2. Sit to supine with Minimal Assistance  3. Rolling to Left and Right with Stand-by Assistance.  4. Bed to  transfer with Minimal Assistance using Slideboard.                     Time Tracking:     PT Received On: 22  PT Start Time: 0949     PT Stop Time: 1022  PT Total Time (min): 33 min     Billable Minutes: Therapeutic Activity 15 (cotx with OT)    Treatment Type: Treatment  PT/PTA: PT     PTA Visit Number: 0     2022

## 2022-01-05 NOTE — NURSING
Charge nurse DEANGELO Mejia placed call to on call NP Wendy Vicente. New order was given for Morphine 1 mg one time dose.

## 2022-01-05 NOTE — NURSING
Called to patient room due to complaints of pain. Patient has been assessed and states pain level is at 10 pain medication will be administered.

## 2022-01-05 NOTE — PLAN OF CARE
Pt progressing towards goals. Pt reporting 6/10 pain in RLE at beginning of session. Pt requiring MinAx2-MaxA x2 for scoot t/f to drop-arm WC with use of slide board. Pt requires increased v/cs for hand placement. Pt declining WC propulsion at this time 2/2 increased pain & pt tearful. Pt performed G&H seated in WC with SBA. Pt unable to rate pain at end of session. RLE elevated on chair with pillow to promote ext. Pt left up in chair with pillow for pressure relief with tray table in front.     Problem: Occupational Therapy Goal  Goal: Occupational Therapy Goal  Description: Goals to be met by: 1/29/2022     Patient will increase functional independence with ADLs by performing:    UE Dressing with Modified [place].  Grooming while seated with Modified [place].  Toileting from bedside commode with Modified [place] for hygiene and clothing management.   Rolling to Bilateral with Modified [place].   Supine to sit with Modified [place].  Toilet transfer to bedside commode with Minimal Assistance.    Outcome: Ongoing, Progressing

## 2022-01-05 NOTE — PROGRESS NOTES
[facility] Medicine  Telemedicine Progress Note    Patient Name: Suyapa Connelly  MRN: [id number]  Patient Class: IP- Inpatient   Admission Date: 12/21/2021  Length of Stay: 14 days  Attending Physician: Ruben Flood MD  Primary Care Provider: Magen Christensen MD          Subjective:     Principal Problem:S/P BKA (below knee amputation) unilateral, right        HPI:  Suyapa Connelly is a 57 yo female with a pmh of DM2, PVD, osteomyelitis, right AKA, CKD3, CAD s/p CABG, afib on eliquis. She presented with right foot pain x 1 month. She has an ulcer to her right heel. Presented with dressing to right heel which was removed and had purulent foul smelling drainage noted. The dressing was not changed in 2 months since she last saw Dr. Askew on 10/22/21 for arthrectomy, thrombectomy, and PTA with DCB to right lower extremity. Had not gone to follow up appts. She now has what appears to be dry gangrene to right toes with bone exposure. She has not been compliant with medications and states she had not taken eliquis in over a month. She states she has not been able to stand due to right leg weakness since her left AKA in May 2021. She also has a pressure wound to the lateral right lower leg. She takes her 's percocet for pain, which was helping some.       Overview/Hospital Course:  Patient admitted with osteomyelitis of RLE and gangrene, ID and podiatry consulted and patient was started on IV vancomycin and zosyn. Podiatry recommended general surgery consult for BKA, which was done. Patient continued to have leukocytosis so remains on IV antibiotics. PT/OT recommending rehab. ID re-consulted on 1/2 due to persistent leukocytosis and repeat blood cultures, CXR, and U/A ordered. Continuing IV zosyn because patient is allergic to ciprofloxacin - appreciate ID recs.De-escalated to PO augmentin on 1/3/22      Interval History: sCr improving,  upset about wound care, wound care  consulted. Now medically ready for d/c to rehab.     Review of Systems   Constitutional: Negative for chills and fever.   Respiratory: Negative for cough and shortness of breath.    Cardiovascular: Negative for chest pain and leg swelling.   Gastrointestinal: Negative for abdominal pain and nausea.   Genitourinary: Negative for difficulty urinating.   Musculoskeletal: Positive for arthralgias and myalgias.        Right leg pain   Skin: Positive for wound.   Neurological: Positive for weakness.   Psychiatric/Behavioral: Positive for dysphoric mood.        Depressed     Objective:     Vital Signs (Most Recent):  Temp: 98.1 °F (36.7 °C) (01/04/22 1950)  Pulse: 97 (01/04/22 2005)  Resp: 18 (01/04/22 2100)  BP: 135/68 (01/04/22 1950)  SpO2: 95 % (01/04/22 1950) Vital Signs (24h Range):  Temp:  [97.3 °F (36.3 °C)-98.9 °F (37.2 °C)] 98.1 °F (36.7 °C)  Pulse:  [] 97  Resp:  [16-18] 18  SpO2:  [94 %-99 %] 95 %  BP: (103-137)/(58-72) 135/68     Weight: 79.2 kg (174 lb 9.7 oz)  Body mass index is 29.06 kg/m².  No intake or output data in the 24 hours ending 01/04/22 2321   Physical Exam  Vitals and nursing note reviewed.   Constitutional:       General: She is not in acute distress.     Appearance: She is obese.   Cardiovascular:      Rate and Rhythm: Normal rate.   Pulmonary:      Effort: Pulmonary effort is normal. No respiratory distress.   Musculoskeletal:      Cervical back: Normal range of motion.      Comments: Bilateral leg amputations   Skin:     General: Skin is dry.      Comments: Right leg BKA   Neurological:      Mental Status: She is alert and oriented to person, place, and time.      Motor: Weakness present.   Psychiatric:         Behavior: Behavior normal.         Thought Content: Thought content normal.      Comments: Depressed about losing leg         Significant Labs:   All pertinent labs within the past 24 hours have been reviewed.  BMP:   Recent Labs   Lab 01/03/22  0519 01/03/22  0519 01/04/22  123    *   < > 172*   *   < > 136   K 4.0   < > 3.8      < > 106   CO2 18*   < > 20*   BUN 49*   < > 46*   CREATININE 1.8*   < > 1.4   CALCIUM 7.4*   < > 8.0*   MG 2.1  --   --     < > = values in this interval not displayed.       Significant Imaging: I have reviewed all pertinent imaging results/findings within the past 24 hours.      Assessment/Plan:      * S/P BKA (below knee amputation) unilateral, right  Gangrene  Osteomyelitis  Diabetic Foot ulcer  PVD    - Dry gangrene right foot, osteo on MRI on presentation  - started on vanc, flagyl, and cefepime  - eliquis held  - ID, cardiology, and general surgery consulted  - BKA on 12/28  - antibiotics stopped post BKA per ID recs  - pain meds adjusted  - nutritional supplements added for wound healing  - PT/OT consulted  - discussed with general surgery, reports wound looks good post operatively  - restart vanc and zosyn   - lactic and blood cultures ordered--NGTD  - WBC uptrending, ID re-consulted 1/2/22  - IPR if remains stable       Osteomyelitis of right foot  See s/p BKA      Diabetic ulcer of right foot associated with type 2 diabetes mellitus  See s/p BKA      Cellulitis  See S/p BKA        Stage 3 chronic kidney disease due to type 2 diabetes mellitus  - slightly worsening, U/A and urine lytes ordered  - Renally dose meds      Chronic diastolic heart failure  - hold lasix due to hypotension  - denies SOB on room air  - no hypoxia noted      Chronic anemia  - Hgb drop from 13 to 7 over 2 month period  - Type and screen  - Hold plavix and eliquis (had not been taking)  - Denies acute blood loss  - s/p 1 U PRBC 12/23 and again 1/1      Hypokalemia  replete      Critical lower limb ischemia  - Had intervention to RLE in October  - patient non-compliant with medications, wound care, and follow up appts  - Persistent non-healing diabetic right foot wounds   - Arterial US showing stenosis   - Cardiology following  - BKA on  12/28/21              Paroxysmal atrial fibrillation  - NSR on tele  - Noncompliant with eliquis  - Cont to hold for now s/p RLE amptutation      Type 2 diabetes mellitus with hyperglycemia, with long-term current use of insulin  - Hold home meds  - Detemir 5u nightly  - Accuchecks and ISS  - A1C 9.6        MDD (major depressive disorder), recurrent episode, moderate  Expresses depression and anger about losing both of her legs this year despite being compliant and doing everything asked of her. (She presented with gangrenous foot after not changing her wound dressing, not attending any follow up appts, and not taking medications for months)  Consult psych for eval, Appreciate recs  increase remeron to 15mg at night  Start zoloft 50mg daily  D/c lexapro      Essential hypertension  - stop amlodipine 5mg daily for now due to soft blood pressures, resume as able  - Monitor pressures  - given IVF on 1/1 and 1/2 due to soft BP      Gangrene  See S/p right BKA          VTE Risk Mitigation (From admission, onward)         Ordered     Reason for No Pharmacological VTE Prophylaxis  Once        Question:  Reasons:  Answer:  Already adequately anticoagulated on oral Anticoagulants    12/21/21 1910     IP VTE HIGH RISK PATIENT  Once         12/21/21 1910                      I have assessed these finding virtually using telemed platform and with assistance of bedside nurse                 The attending portion of this evaluation, treatment, and documentation was performed per Ruben Flood MD via Telemedicine AudioVisual using the secure [employer] software platform with 2 way audio/video. The provider was located off-site and the patient is located in the hospital. The aforementioned video software was utilized to document the relevant history and physical exam    Ruben Flood MD  Department of Hospital Medicine   [place] [facility]

## 2022-01-05 NOTE — NURSING
Patient lying in bed awake and alert patient board has been updated medications has  been reviewed.Head to toe assessment complete. No acute distress noted at this time. Patient has no one at bedside at this time. Will continue to monitor.

## 2022-01-05 NOTE — NURSING
"Called to patient room upon arrival  begins to scream and yell stating" board has not been updated and patient has not received pain medication". Informed patient  that board was updated with my name date charge nurse name pct name plan of care and when the last time patient received pain medication and when the next dose is scheduled. Patient  continued to scream and yell stating " I want my wife to have morphine and to call the charge nurse." Charge nurse DEANGELO Mejia and security  was summoned to room.  begin to scream and yell at charge nurse security asked  to calm down unless he will have to leave the premises. After much redirection security was able to deescalate  and charge nurse was able to discuss patient receiving more pain medication.   "

## 2022-01-05 NOTE — NURSING
Patient has received oxycodone-acetaminophen  mg for pain in right leg. Patient has been turned on left side per Natasha PCT. Patient will be reassessed for pain at 2130.

## 2022-01-05 NOTE — TELEPHONE ENCOUNTER
"      Notes: :)Hosp. D/c 10/22/2021   Rescheduled From: Mon Nov 22, 2021 1500       Rescheduled:  Canceled: 11/22/2021 3:54 PM  1/5/2022 12:16 PM By:  By: MARLEN MORFIN [[id number]] (ES)  ARTHUR DURAND [[id number]] (ES)   Cancel Rsn: Other (NO SHOWED for her appt today 1-5-22 with )     PT NO SHOWED FOR HER APPT TODAY  1-5-20222.  PT WAS SCHEDULED TO SEE "[facility]   Post Hospital Discharged.        Nw  "

## 2022-01-05 NOTE — PLAN OF CARE
Per , pt medically stable to d/c to [facility] today. Vicki at [facility] states she has to speak with her rehab MD regarding pt possibly transferring to [facility] today.          01/05/22 1225   Post-Acute Status   Post-Acute Authorization Placement   Post-Acute Placement Status Pending medical clearance/testing   Discharge Plan   Discharge Plan A Rehab

## 2022-01-05 NOTE — NURSING
Patients pain reassessed. Pain level is now at 8. Will continue to monitor and reassess. Patient refused senna due to having a bowel movement.

## 2022-01-05 NOTE — PT/OT/SLP PROGRESS
Occupational Therapy   Treatment    Name: Suyapa Connelly  MRN: [id number]  Admitting Diagnosis:  S/P BKA (below knee amputation) unilateral, right  8 Days Post-Op    Recommendations:     Discharge Recommendations: rehabilitation facility  Discharge Equipment Recommendations:  slide board,other (see comments) (BSC with drop-arm, WC with drop-arm and elevating R leg rest)  Barriers to discharge:  Other (Comment) (Pt requires increased assitance at this time)    Assessment:     Suyapa Connelly is a 56 y.o. female with a medical diagnosis of S/P BKA (below knee amputation) unilateral, right.  She presents with The primary encounter diagnosis was Gangrene. Diagnoses of Pain, Cold foot, Cellulitis, unspecified cellulitis site, Osteomyelitis of right foot, unspecified type, Chest pain, Uremia, Osteomyelitis of right foot, Gangrene of extremity, Diabetic ulcer of right foot associated with type 2 diabetes mellitus, with necrosis of muscle, unspecified part of foot, Diabetic ulcer of right midfoot associated with type 2 diabetes mellitus, with muscle involvement without evidence of necrosis, QT prolongation, and S/P BKA (below knee amputation) unilateral, right were also pertinent to this visit. Performance deficits affecting function are weakness,impaired endurance,impaired self care skills,impaired functional mobilty,gait instability,pain,edema,impaired skin,decreased ROM,decreased lower extremity function,impaired balance,decreased coordination,impaired joint extensibility,decreased safety awareness.    Pt progressing towards goals. Pt reporting 6/10 pain in RLE at beginning of AM session. Pt requiring Venecia x2-MaxA x2 for scoot t/f to drop-arm WC with use of slide board. Pt requires increased v/cs for hand placement. Pt declining WC propulsion at this time 2/2 increased pain & pt tearful. Pt performed G&H seated in WC with SBA. Pt unable to rate pain at end of session. RLE elevated on chair with pillow to promote ext.  Pt left up in chair with pillow for pressure relief with tray table in front.     Rehab Prognosis:  Good; patient would benefit from acute skilled OT services to address these deficits and reach maximum level of function.       Plan:     Patient to be seen 5 x/week to address the above listed problems via self-care/home management,therapeutic activities,therapeutic exercises  · Plan of Care Expires: 01/29/22  · Plan of Care Reviewed with: patient    Subjective     Pain/Comfort:  · Pain Rating 1: 8/10  · Location - Side 1: Right  · Location 1: leg  · Pain Addressed 1: Pre-medicate for activity,Reposition,Distraction,Cessation of Activity,Nurse notified  · Pain Rating Post-Intervention 1: other (see comments) (unable to rate)    Objective:     Communicated with: nsg prior to session.  Patient found HOB elevated with PureWick,bed alarm,Other (comments) (grey wedge for pressure relief) upon OT entry to room.    General Precautions: Standard, fall   Orthopedic Precautions:N/A   Braces: N/A  Respiratory Status: Room air     Occupational Performance:     Bed Mobility:    · Patient completed Supine to Sit with moderate assistance and 2 persons 2/2 RLE management & decreased trunk control      Functional Mobility/Transfers:  · Patient completed Bed <> WheelChair Transfer using Slide Board technique with minimum assistance and 2 persons initially then maximal assistance and of 2 persons for last 2-3 scoots with slide board. Pt requires increased v/cs for hand placement.   · Functional Mobility: Pt declining WC prolusion this date 2/2 increased pain & pt tearful     Activities of Daily Living:  · Grooming: stand by assistance for oral hygiene seated up in WC      [facility] 6 Click ADL: 17    Treatment & Education:  Pt progressing towards goals.   Pt reporting 6/10 pain in RLE at beginning of AM session.   Pt requiring MinAx2-MaxA x2 for scoot t/f to drop-arm WC with use of slide board. Pt educated on use of slide board for pt  safety, agreeable to t/f to drop-arm WC  Pt requires increased v/cs for hand placement.   Pt declining WC propulsion at this time 2/2 increased pain & pt tearful.   Pt performed G&H seated in WC with SBA.   Pt unable to rate pain at end of session.     Patient left up in chair with all lines intact, call button in reach, chair alarm on and nsg notifiedEducation:   RLE elevated on chair with pillow to promote ext & pillow in chair for pressure relief with tray table in front.     PM Session:  Pt reporting 8/10 pain at beginning of session.   Pt requesting to get back to bed.   Nsg & PCT in room to assist with slide board t/f for pt safety.  Pt required ModA-MaxA x2 for slide board t/f to bed with use of slide board. PCT supporting RLE during t/f for pain management. Pt requires increased verbal/tactile cues for hand placement on slide board.   Pt left seated EOB with PCT & nsg for nsg care.         GOALS:   Multidisciplinary Problems     Occupational Therapy Goals        Problem: Occupational Therapy Goal    Goal Priority Disciplines Outcome Interventions   Occupational Therapy Goal     OT, PT/OT Ongoing, Progressing    Description: Goals to be met by: 1/29/2022     Patient will increase functional independence with ADLs by performing:    UE Dressing with Modified [place].  Grooming while seated with Modified [place].  Toileting from bedside commode with Modified [place] for hygiene and clothing management.   Rolling to Bilateral with Modified [place].   Supine to sit with Modified [place].  Toilet transfer to bedside commode with Minimal Assistance.                     Time Tracking:     OT Date of Treatment: 01/05/22  OT Start Time: 0949 (7333)  OT Stop Time: 1022 (2435)  OT Total Time (min): 33 min (14 min PM)    Billable Minutes:Self Care/Home Management 16 co tx with PT in AM  Therapeutic Activity 14 PM session    OT/MACKENZIE: OT     MACKENZIE Visit Number: 0    1/5/2022

## 2022-01-06 ENCOUNTER — TELEPHONE (OUTPATIENT)
Dept: INTERNAL MEDICINE | Facility: CLINIC | Age: 57
End: 2022-01-06
Payer: MEDICARE

## 2022-01-06 LAB
BACTERIA UR CULT: ABNORMAL
POCT GLUCOSE: 160 MG/DL (ref 70–110)
POCT GLUCOSE: 225 MG/DL (ref 70–110)
POCT GLUCOSE: 231 MG/DL (ref 70–110)
POCT GLUCOSE: 259 MG/DL (ref 70–110)

## 2022-01-06 PROCEDURE — 27000207 HC ISOLATION

## 2022-01-06 PROCEDURE — 25000003 PHARM REV CODE 250: Performed by: STUDENT IN AN ORGANIZED HEALTH CARE EDUCATION/TRAINING PROGRAM

## 2022-01-06 PROCEDURE — 25000003 PHARM REV CODE 250: Performed by: NURSE PRACTITIONER

## 2022-01-06 PROCEDURE — 25000003 PHARM REV CODE 250: Performed by: PSYCHIATRY & NEUROLOGY

## 2022-01-06 PROCEDURE — 11000001 HC ACUTE MED/SURG PRIVATE ROOM

## 2022-01-06 PROCEDURE — 97530 THERAPEUTIC ACTIVITIES: CPT | Mod: CQ

## 2022-01-06 PROCEDURE — 97530 THERAPEUTIC ACTIVITIES: CPT

## 2022-01-06 RX ORDER — OXYCODONE HYDROCHLORIDE 5 MG/1
5 TABLET ORAL ONCE
Status: COMPLETED | OUTPATIENT
Start: 2022-01-06 | End: 2022-01-06

## 2022-01-06 RX ADMIN — AMOXICILLIN AND CLAVULANATE POTASSIUM 1 TABLET: 875; 125 TABLET, FILM COATED ORAL at 09:01

## 2022-01-06 RX ADMIN — SERTRALINE HYDROCHLORIDE 50 MG: 50 TABLET ORAL at 09:01

## 2022-01-06 RX ADMIN — OXYCODONE AND ACETAMINOPHEN 1 TABLET: 10; 325 TABLET ORAL at 03:01

## 2022-01-06 RX ADMIN — ATORVASTATIN CALCIUM 80 MG: 40 TABLET, FILM COATED ORAL at 09:01

## 2022-01-06 RX ADMIN — INSULIN DETEMIR 5 UNITS: 100 INJECTION, SOLUTION SUBCUTANEOUS at 09:01

## 2022-01-06 RX ADMIN — INSULIN ASPART 1 UNITS: 100 INJECTION, SOLUTION INTRAVENOUS; SUBCUTANEOUS at 09:01

## 2022-01-06 RX ADMIN — INSULIN ASPART 2 UNITS: 100 INJECTION, SOLUTION INTRAVENOUS; SUBCUTANEOUS at 11:01

## 2022-01-06 RX ADMIN — OXYCODONE AND ACETAMINOPHEN 1 TABLET: 10; 325 TABLET ORAL at 06:01

## 2022-01-06 RX ADMIN — OXYCODONE AND ACETAMINOPHEN 1 TABLET: 10; 325 TABLET ORAL at 11:01

## 2022-01-06 RX ADMIN — PREGABALIN 50 MG: 50 CAPSULE ORAL at 03:01

## 2022-01-06 RX ADMIN — OXYCODONE AND ACETAMINOPHEN 1 TABLET: 10; 325 TABLET ORAL at 09:01

## 2022-01-06 RX ADMIN — INSULIN ASPART 3 UNITS: 100 INJECTION, SOLUTION INTRAVENOUS; SUBCUTANEOUS at 04:01

## 2022-01-06 RX ADMIN — ALLOPURINOL 100 MG: 100 TABLET ORAL at 10:01

## 2022-01-06 RX ADMIN — OXYCODONE 5 MG: 5 TABLET ORAL at 10:01

## 2022-01-06 RX ADMIN — MIRTAZAPINE 15 MG: 7.5 TABLET ORAL at 09:01

## 2022-01-06 RX ADMIN — PREGABALIN 50 MG: 50 CAPSULE ORAL at 09:01

## 2022-01-06 NOTE — SUBJECTIVE & OBJECTIVE
Interval History: sCr improving,  upset about wound care, wound care consulted. Now medically ready for d/c to rehab however received small dose of IV morphine overnight. Per rehab, will monitor pain control today. Patient relations now involved.    Review of Systems   Constitutional: Negative for chills and fever.   Respiratory: Negative for cough and shortness of breath.    Cardiovascular: Negative for chest pain and leg swelling.   Gastrointestinal: Negative for abdominal pain and nausea.   Genitourinary: Negative for difficulty urinating.   Musculoskeletal: Positive for arthralgias and myalgias.        Right leg pain   Skin: Positive for wound.   Neurological: Positive for weakness.   Psychiatric/Behavioral: Positive for dysphoric mood.        Depressed     Objective:     Vital Signs (Most Recent):  Temp: 99 °F (37.2 °C) (01/05/22 1901)  Pulse: 90 (01/05/22 1934)  Resp: 18 (01/05/22 2151)  BP: (!) 95/53 (01/05/22 1901)  SpO2: 96 % (01/05/22 1901) Vital Signs (24h Range):  Temp:  [97.8 °F (36.6 °C)-99 °F (37.2 °C)] 99 °F (37.2 °C)  Pulse:  [] 90  Resp:  [16-20] 18  SpO2:  [96 %-100 %] 96 %  BP: ()/(53-87) 95/53     Weight: 79.2 kg (174 lb 9.7 oz)  Body mass index is 29.06 kg/m².    Intake/Output Summary (Last 24 hours) at 1/5/2022 2340  Last data filed at 1/5/2022 1750  Gross per 24 hour   Intake 200 ml   Output 1050 ml   Net -850 ml      Physical Exam  Vitals and nursing note reviewed.   Constitutional:       General: She is not in acute distress.     Appearance: She is obese.   Cardiovascular:      Rate and Rhythm: Normal rate.   Pulmonary:      Effort: Pulmonary effort is normal. No respiratory distress.   Musculoskeletal:      Cervical back: Normal range of motion.      Comments: Bilateral leg amputations   Skin:     General: Skin is dry.      Comments: Right leg BKA   Neurological:      Mental Status: She is alert and oriented to person, place, and time.      Motor: Weakness present.    Psychiatric:         Behavior: Behavior normal.         Thought Content: Thought content normal.      Comments: Depressed about losing leg         Significant Labs:   All pertinent labs within the past 24 hours have been reviewed.  BMP:   Recent Labs   Lab 01/04/22  1238   *      K 3.8      CO2 20*   BUN 46*   CREATININE 1.4   CALCIUM 8.0*       Significant Imaging: I have reviewed all pertinent imaging results/findings within the past 24 hours.

## 2022-01-06 NOTE — TELEPHONE ENCOUNTER
----- Message from Luisana Bills sent at 1/6/2022  2:30 PM CST -----  Regarding: Hosp f/u  Patient is discharging from [facility] and is needing a hospital f/u scheduled in 7 days.  Please schedule and message me back so that Discharge Nurse can relay appointment information to patient prior to discharge.    DX: S/P BKA (below knee amputation) unilateral, right    ThanksBeata  Access Navigator/Discharge

## 2022-01-06 NOTE — PT/OT/SLP PROGRESS
Occupational Therapy  Co-Treatment w/ PTA    Name: Suyapa Connelly  MRN: [id number]  Admitting Diagnosis:  S/P BKA (below knee amputation) unilateral, right  9 Days Post-Op  Pre-op Diagnosis: Cold foot [R20.9]  Gangrene [I96] s/p Procedure(s):  AMPUTATION, BELOW KNEE   The primary encounter diagnosis was Gangrene. Diagnoses of Pain, Cold foot, Cellulitis, unspecified cellulitis site, Osteomyelitis of right foot, unspecified type, Chest pain, Uremia, Osteomyelitis of right foot, Gangrene of extremity, Diabetic ulcer of right foot associated with type 2 diabetes mellitus, with necrosis of muscle, unspecified part of foot, Diabetic ulcer of right midfoot associated with type 2 diabetes mellitus, with muscle involvement without evidence of necrosis, QT prolongation, and S/P BKA (below knee amputation) unilateral, right were also pertinent to this visit.    Recommendations:     Discharge Recommendations: rehabilitation facility  Discharge Equipment Recommendations:   (wide drop arm BSC, beasy board,wc with drop arm and elevating leg rest, pressure relieving wc cushion)  Barriers to discharge:       Assessment:     Suyapa Connelly is a 56 y.o. female with a medical diagnosis of S/P BKA (below knee amputation) unilateral, right.  She presents with Performance deficits affecting function are weakness,impaired endurance,impaired self care skills,impaired functional mobilty,gait instability,impaired sensation,impaired balance,decreased coordination,decreased upper extremity function,decreased lower extremity function,decreased safety awareness,pain,decreased ROM,impaired skin,impaired joint extensibility.     Pt. progressing toward OT goals. She performed bed mobility with Min A for RLE and increased time and effort for supine<>sit 2/2 pain. Pt. motivated to go to IPR.  Continue with OT POC.     Rehab Prognosis:  Good; patient would benefit from acute skilled OT services to address these deficits and reach maximum level of  function.       Plan:     Patient to be seen 5 x/week to address the above listed problems via self-care/home management,therapeutic activities,therapeutic exercises  · Plan of Care Expires: 01/29/22  · Plan of Care Reviewed with: patient,spouse    Subjective     Pain/Comfort:  Pain Rating 1: 8/10  Location - Side 1: Right  Location - Orientation 1: generalized  Location 1: leg  Pain Addressed 1: Pre-medicate for activity,Reposition,Distraction,Cessation of Activity,Nurse notified  Pain Rating Post-Intervention 1: 8/10    Objective:     Communicated with: nurse prior to session.  Patient found left sidelying with PureWick,bed alarm,telemetry upon OT entry to room.    General Precautions: Standard, fall,diabetic,contact   Orthopedic Precautions:N/A   Braces: N/A  Respiratory Status: Room air     Occupational Performance:     Bed Mobility:    · Patient completed Rolling/Turning to Left with  contact guard assistance, with side rail and increased time due to pain, guarded  · Patient completed Rolling/Turning to Right with contact guard assistance, with side rail and same as above  · Patient completed Scooting/Bridging with minimum assistance x 2 person assist using draw sheet; pt pulling up on headboard rails and drawsheet   · Patient completed Supine to Sit with minimum assistance for RLE assist over EOB  2/2 pain from long sit, increased time and effort.  · Patient completed Sit to Supine with minimum assistance and for RLE assist, same asa harry      [facility] 6 Click ADL: 17    Treatment & Education:  Role of OT and POC, pt agreeable.  Pt. was initially emotional/ tearful about medical condition/situation and expressed her concerns and feelings. OT provided emotional support and provided empathical listening to pt.  Pt.provided with education/training in deep breathing tech for pain management due to RLE pain at rest,with transitional movement in bed and while seated EOB Pt. demonstrated return with max vc 2/2 tended to  hold her breath and clinched teeth.   All questions/concerns of pt and spouse addressed within scope.   Pt transitioned to EOB with pt directing care and min A for RLE assist, pillow support under RLE to gently guide and support limb as pt. moved trunk using BUE with increased time and effort. Pt directed care to help with managing pain in limb.   Pt sat 30' EOB with SBA with R residual limb initially resting on BSC (with BSC facing bed) then switched to a chair with lower seat and pillow 2/2 increased limb pain. Spouse was very supportive and instrumental in helping solving pt's repositioning needs for RLE. Pt performed lateral WS on bed to left side only with SBA for pressure relief to sacral area due to discomfort from wounds.   Pt. returned to supine with Min A for RLE, left in R side line position for offloading to sacral area with wedge.    Patient left right sidelying with all lines intact, call button in reach, bed alarm on, nurse notified and spouse, MIL presentEducation:      GOALS:   Multidisciplinary Problems     Occupational Therapy Goals        Problem: Occupational Therapy Goal    Goal Priority Disciplines Outcome Interventions   Occupational Therapy Goal     OT, PT/OT Ongoing, Progressing    Description: Goals to be met by: 1/29/2022     Patient will increase functional independence with ADLs by performing:    UE Dressing with Modified [place].  Grooming while seated with Modified [place].  Toileting from bedside commode with Modified [place] for hygiene and clothing management.   Rolling to Bilateral with Modified [place].   Supine to sit with Modified [place].  Toilet transfer to bedside commode with Minimal Assistance.                     Time Tracking:     OT Date of Treatment: 01/06/22  OT Start Time: 1155  OT Stop Time: 1307  OT Total Time (min): 72 min cotx with PTA    Billable Minutes:Therapeutic Activity 30  Total Time 30    OT/MACKENZIE: OT     MACKENZIE Visit Number:  0    1/6/2022

## 2022-01-06 NOTE — PROGRESS NOTES
"[facility] - Telemetry  Adult Nutrition  Progress Note    SUMMARY       Recommendations    Recommendation:   1. Encourage intake of meals & supplements as tolerated.   2. Monitor weight/labs.   3. RD to continue to follow to monitor po intake    Goals:   Pt to consume at least 50-75% intake at meals by RD follow up  Nutrition Goal Status: new  Communication of RD Recs: reviewed with RN    Assessment and Plan  Critical lower limb ischemia  Contributing Nutrition Diagnosis  Increased protein needs    Related to (etiology):   Wound healing    Signs and Symptoms (as evidenced by):   R leg wound    Interventions:  Collaboration with other providers  Increased protein diet  Modified Beverage- Geremias BID    Nutrition Diagnosis Status:   Continues      Malnutrition Assessment  Unable to assess NFPE 2/2 pt on contact isolation     Reason for Assessment  Reason For Assessment: RD follow-up  Diagnosis:  (gangrene)  Relevant Medical History: GERD, HTN, HLD, DM2, MI, anxiety, depression, PVD, CKD, CABG, a-fib, s/p L AKA 5/18/21, now s/p R BKA 12/28/21  Interdisciplinary Rounds: did not attend  General Information Comments: Pt on 2000 ADA with Boost Glucose Control, Geremias, & beneprotein. Pt with fair intake at meals. Carlos 15- R leg incision & coccyx wound. R BKA. L AKA. Pt on contact isolation-unable to assess NFPE. Weight stable.  Nutrition Discharge Planning: d/c on diabetic cardiac diet with Geremias BID for 14 days    Nutrition Risk Screen  Nutrition Risk Screen: no indicators present    Nutrition/Diet History  Food Preferences: no Christian or cultural food prefs identified  Spiritual, Cultural Beliefs, Pentecostal Practices, Values that Affect Care: no  Food Allergies: NKFA  Factors Affecting Nutritional Intake: None identified at this time    Anthropometrics  Temp: 99 °F (37.2 °C)  Height Method: Stated  Height: 5' 5" (165.1 cm)  Height (inches): 65 in  Weight Method: Bed Scale  Weight: 79.2 kg (174 lb 9.7 oz)  Weight (lb): 174.61 " lb  Ideal Body Weight (IBW), Female: 125 lb  % Ideal Body Weight, Female (lb): 139.69 %  BMI (Calculated): 29.1  BMI Grade: 25 - 29.9 - overweight  Amputation %: 16,5.9  Total Amputation %: 21.9  Amputation Ideal Body Weight (IBW), Female (lb): 103.1 lb  Amputee BMI (kg/m2): 37.31 kg/m2     Lab/Procedures/Meds  Pertinent Labs Reviewed: reviewed  Pertinent Labs Comments: BUN 46H, Glu 172H, Ca 8.0L, Alb 1.3L  Pertinent Medications Reviewed: reviewed  Pertinent Medications Comments: insulin, senna    Estimated/Assessed Needs  Weight Used For Calorie Calculations: 79.2 kg (174 lb 9.7 oz)  Energy Calorie Requirements (kcal): 1547 (25 kcal/kg with amputation%)  Energy Need Method: Kcal/kg  Protein Requirements: 62g (1.0g/kg with amputation%)  Weight Used For Protein Calculations: 79.2 kg (174 lb 9.7 oz)  Estimated Fluid Requirement Method: RDA Method  RDA Method (mL): 1547  CHO Requirement: 200g    Nutrition Prescription Ordered  Current Diet Order: 2000 ADA  Oral Nutrition Supplement: Boost Glucose, Geremias, & beneprotein    Evaluation of Received Nutrient/Fluid Intake  I/O: 200/1050  Energy Calories Required: meeting needs  Protein Required: meeting needs  Fluid Required: meeting needs  Comments: LBM 1/5  Tolerance: tolerating  % Intake of Estimated Energy Needs: 50 - 75 %  % Meal Intake: 50 - 75 %    Nutrition Risk  Level of Risk/Frequency of Follow-up:  (1xweekly)     Monitor and Evaluation  Food and Nutrient Intake: food and beverage intake  Food and Nutrient Adminstration: diet order  Knowledge/Beliefs/Attitudes: food and nutrition knowledge/skill  Physical Activity and Function: nutrition-related ADLs and IADLs  Anthropometric Measurements: weight  Biochemical Data, Medical Tests and Procedures: electrolyte and renal panel  Nutrition-Focused Physical Findings: overall appearance     Nutrition Follow-Up  RD Follow-up?: Yes

## 2022-01-06 NOTE — PLAN OF CARE
I spoke with pt and Randell Connelly (Spouse) [phone] via [facility] regarding IPR placement.  I attempted to call [facility] to see if they received the referral sent yesterday.  I had to leave a .  Randell states he is calling [facility] now to check visitation policy/hours.  If he is not pleased with visitation policy, he prefers [facility].  He is calling now and requests that I check back with him in 10-15 minutes.    15:00  I spoke with Randell and [facility] does not have overnight visitation.  His first preference is [facility].  [facility].       01/06/22 1425   Post-Acute Status   Post-Acute Authorization Placement  (IPR)     Marty Bruno RN,   [phone]

## 2022-01-06 NOTE — PT/OT/SLP PROGRESS
Physical Therapy Treatment    Patient Name:  Suyapa Connelly   MRN:  [id number]    Recommendations:     Discharge Recommendations:  rehabilitation facility   Discharge Equipment Recommendations:  (if pt does not go to IPR (wide bedside commode with drop arm, w/c with w/c eval for pt's specific needs,)   Barriers to discharge: needs increased assistance at this time.    Assessment:     Suyapa Connelly is a 56 y.o. female admitted with a medical diagnosis of S/P BKA (below knee amputation) unilateral, right.  She presents with the following impairments/functional limitations:  weakness,impaired endurance,impaired self care skills,impaired functional mobilty,impaired balance,decreased coordination,decreased lower extremity function,decreased upper extremity function,pain,decreased ROM,impaired coordination,impaired skin,edema.  Pt would continue to benefit from P.T. To address impairments listed above.Pt would continue to benefit from P.T. To address impairments listed above.  .    Rehab Prognosis: Fair; patient would benefit from acute skilled PT services to address these deficits and reach maximum level of function.    Recent Surgery: Procedure(s) (LRB):  AMPUTATION, BELOW KNEE (Right) 9 Days Post-Op    Plan:     During this hospitalization, patient to be seen 5 x/week to address the identified rehab impairments via therapeutic activities,therapeutic exercises,neuromuscular re-education,wheelchair management/training and progress toward the following goals:    · Plan of Care Expires:  01/29/22    Subjective     Chief Complaint: R residual limb pain.  Patient/Family Comments/goals: Pt agreed to tx.  Pain/Comfort:  · Pain Rating 1: 8/10  · Location - Side 1: Right  · Location - Orientation 1: generalized  · Location 1: leg  · Pain Rating Post-Intervention 1: 8/10      Objective:       Patient found supine with PureWick,bed alarm,telemetry upon PT entry to room.     General Precautions: Standard, fall   Orthopedic  Precautions:N/A   Braces:    Respiratory Status: Room air     Functional Mobility:  · Bed Mobility:     · Rolling Left:  contact guard assistance with b/r for assist  · Rolling Right: contact guard assistance with b/r for assist  · Scooting: minimum assistance of 2 persons with pt pulling up with BUEs using headboard and PTA and OT assisting with drawsheet.   · Supine to Sit: minimum assistance and for RLE from long sit position  · Sit to Supine: minimum assistance for RLE  · Balance: sitting fair+      AM-PAC 6 CLICK MOBILITY  Turning over in bed (including adjusting bedclothes, sheets and blankets)?: 3  Sitting down on and standing up from a chair with arms (e.g., wheelchair, bedside commode, etc.): 1  Moving from lying on back to sitting on the side of the bed?: 3  Moving to and from a bed to a chair (including a wheelchair)?: 2  Need to walk in hospital room?: 1  Climbing 3-5 steps with a railing?: 1  Basic Mobility Total Score: 11       Therapeutic Activities and Exercises:    Pt. was initially emotional/ tearful about current medical condition/situation.  PTA and OT provided emotional support as pt expressed her concerns and feelings.  Pt provided with education/training in deep breathing tech for pain management due to RLE pain at rest and with transitional movement in bed and while seated EOB.  All questions/concerns of pt and spouse addressed within scope.   Pt transitioned to EOB with pt directing care and min A for RLE assist, pillow support under RLE to gently guide and support limb as pt moved trunk using BUE with increased time and effort.    Pt sat EOB 30 mins with SBA with R residual limb initially resting on BSC (with BSC facing bed) then switched to a chair with lower seat and pillow 2/2 increased limb pain. Spouse was very supportive and instrumental in helping solving pt's repositioning needs for RLE. Pt performed lateral WS on bed to left side only with SBA for pressure relief to sacral area due  to discomfort from wounds.   Pt. returned to supine with Min A for RLE, left in R side line position for offloading to sacral area with wedge.   Bed mobility as above to adjust bed pad and drawsheet, as well as, to assist pt with positioning in right sidleying for sacral/back pressure relief with foam wedge at pt's back for support and pillow between residual limbs for pressure relief/comfort.     Patient left right sidelying with all lines intact, call button in reach, bed alarm on and RN\ notified..    GOALS:   Multidisciplinary Problems     Physical Therapy Goals        Problem: Physical Therapy Goal    Goal Priority Disciplines Outcome Goal Variances Interventions   Physical Therapy Goal     PT, PT/OT Ongoing, Progressing     Description: Goals to be met by: 22     Patient will increase functional independence with mobility by performin. Supine to sit with Minimal Assistance  2. Sit to supine with Minimal Assistance  3. Rolling to Left and Right with Stand-by Assistance.  4. Bed to WC transfer with Minimal Assistance using Slideboard.                     Time Tracking:     PT Received On: 22  PT Start Time: 1155     PT Stop Time: 1307  PT Total Time (min): 72 min     Billable Minutes: Therapeutic Activity 42  Co tx 30       PT/PTA: PTA     PTA Visit Number: 1     2022

## 2022-01-06 NOTE — PROGRESS NOTES
Per Vicki with [facility], patient not medically stable to transfer to IPR today per rehab MD.     TN spoke with patients spouse Randell regarding anticipated discharge plan to [facility]; patients spouse states he would like to consider [facility] as an alternate IPR preference for patient to go to pending their visitation policy and pts final decision. Patients spouse requested TN to send patients IPR referral to [facility]  to be reviewed. Patients spouse states he would follow up with TN once he called [facility] to inquire about their visitation policy and after he spoke with pt.

## 2022-01-06 NOTE — PLAN OF CARE
Problem: Occupational Therapy Goal  Goal: Occupational Therapy Goal  Description: Goals to be met by: 1/29/2022     Patient will increase functional independence with ADLs by performing:    UE Dressing with Modified [place].  Grooming while seated with Modified [place].  Toileting from bedside commode with Modified [place] for hygiene and clothing management.   Rolling to Bilateral with Modified [place].   Supine to sit with Modified [place].  Toilet transfer to bedside commode with Minimal Assistance.    Outcome: Ongoing, Progressing

## 2022-01-06 NOTE — PLAN OF CARE
Problem: Physical Therapy Goal  Goal: Physical Therapy Goal  Description: Goals to be met by: 22     Patient will increase functional independence with mobility by performin. Supine to sit with Minimal Assistance  2. Sit to supine with Minimal Assistance  3. Rolling to Left and Right with Stand-by Assistance.  4. Bed to WC transfer with Minimal Assistance using Slideboard.    Outcome: Ongoing, Progressing   Continue working toward goals.

## 2022-01-06 NOTE — PLAN OF CARE
EDUARD received call from Cee [phone] with [facility]. She informed sw that pt's insurance changed this year to [employer] from Medicare. [facility] is waiting for auth. for new insurance. Cee stated they are concerned about pt's WBC, Pain control, and physical therapy progress. Eduard will follow    JENELLE Crystal  [phone]      Future Appointments   Date Time Provider Department Center   4/4/2022  2:30 PM Fawad Jameson MD [facility]        01/06/22 1343   Post-Acute Status   Post-Acute Authorization Placement   Coverage [employer]   Discharge Plan   Discharge Plan A Rehab

## 2022-01-06 NOTE — CONSULTS
[facility] - Telemetry  Wound Care    Patient Name:  Suyapa Connelly   MRN:  [id number]  Date: 1/6/2022  Diagnosis: S/P BKA (below knee amputation) unilateral, right    History:     Past Medical History:   Diagnosis Date    Anxiety     Chronic pain syndrome     CKD (chronic kidney disease), stage III     Depression     Diabetes mellitus     type 2    Diabetes mellitus, type 2     GERD (gastroesophageal reflux disease)     Hyperemesis 3/23/2021    Hyperlipemia     Hypertension     Hypokalemia 3/23/2021    Myocardial infarction 2010    minor-caused by stress per pt.   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      Social Determinants of Health     Financial Resource Strain: High Risk    Difficulty of Paying Living Expenses: Hard   Food Insecurity: No Food Insecurity    Worried About Running Out of Food in the Last Year: Never true    Ran Out of Food in the Last Year: Never true   Transportation Needs: No Transportation Needs    Lack of Transportation (Medical): No    Lack of Transportation (Non-Medical): No   Stress: Stress Concern Present    Feeling of Stress : Rather much   Housing Stability: Low Risk     Unable to Pay for Housing in the Last Year: No    Number of Places Lived in the Last Year: 1    Unstable Housing in the Last Year: No       Precautions:     Allergies as of 12/21/2021 - Reviewed 12/21/2021   Allergen Reaction Noted    Ciprofloxacin Itching 07/18/2020    Contrast media   01/10/2020    Iodine  01/10/2020       WO Assessment Details/Treatment     BKA managed by general surgery with orders in place for nursing to perform dressing changes daily.    Sacrum- scattered partial and healing full thickness wounds related to moisture    Recommendations discussed with pt, nurse and MD team- long discussion with pt regarding her diabetes, infection, and surgeries allowing pt to discuss her feelings and provided her with encouragement.   - Pressure injury prevention intervention- to include waffle overlay and repositioning every 2 hours  - Triad ointment BID and prn incontinent episode     01/06/2022

## 2022-01-06 NOTE — PLAN OF CARE
Problem: Adult Inpatient Plan of Care  Goal: Plan of Care Review  Outcome: Ongoing, Progressing   Chart reviewed .

## 2022-01-06 NOTE — PROGRESS NOTES
[facility]  Telemedicine Progress Note    Patient Name: Suyapa Connelly  MRN: [id number]  Patient Class: IP- Inpatient   Admission Date: 12/21/2021  Length of Stay: 15 days  Attending Physician: Ruben Flood MD  Primary Care Provider: Magen Christensen MD          Subjective:     Principal Problem:S/P BKA (below knee amputation) unilateral, right        HPI:  Suyapa Connelly is a 55 yo female with a pmh of DM2, PVD, osteomyelitis, right AKA, CKD3, CAD s/p CABG, afib on eliquis. She presented with right foot pain x 1 month. She has an ulcer to her right heel. Presented with dressing to right heel which was removed and had purulent foul smelling drainage noted. The dressing was not changed in 2 months since she last saw Dr. Askew on 10/22/21 for arthrectomy, thrombectomy, and PTA with DCB to right lower extremity. Had not gone to follow up appts. She now has what appears to be dry gangrene to right toes with bone exposure. She has not been compliant with medications and states she had not taken eliquis in over a month. She states she has not been able to stand due to right leg weakness since her left AKA in May 2021. She also has a pressure wound to the lateral right lower leg. She takes her 's percocet for pain, which was helping some.       Overview/Hospital Course:  Patient admitted with osteomyelitis of RLE and gangrene, ID and podiatry consulted and patient was started on IV vancomycin and zosyn. Podiatry recommended general surgery consult for BKA, which was done. Patient continued to have leukocytosis so remains on IV antibiotics. PT/OT recommending rehab. ID re-consulted on 1/2 due to persistent leukocytosis and repeat blood cultures, CXR, and U/A ordered. Continuing IV zosyn because patient is allergic to ciprofloxacin - appreciate ID recs.De-escalated to PO augmentin on 1/3/22      Interval History: sCr improving,  upset about wound care, wound care  consulted. Now medically ready for d/c to rehab however received small dose of IV morphine overnight. Per rehab, will monitor pain control today. Patient relations now involved.    Review of Systems   Constitutional: Negative for chills and fever.   Respiratory: Negative for cough and shortness of breath.    Cardiovascular: Negative for chest pain and leg swelling.   Gastrointestinal: Negative for abdominal pain and nausea.   Genitourinary: Negative for difficulty urinating.   Musculoskeletal: Positive for arthralgias and myalgias.        Right leg pain   Skin: Positive for wound.   Neurological: Positive for weakness.   Psychiatric/Behavioral: Positive for dysphoric mood.        Depressed     Objective:     Vital Signs (Most Recent):  Temp: 99 °F (37.2 °C) (01/05/22 1901)  Pulse: 90 (01/05/22 1934)  Resp: 18 (01/05/22 2151)  BP: (!) 95/53 (01/05/22 1901)  SpO2: 96 % (01/05/22 1901) Vital Signs (24h Range):  Temp:  [97.8 °F (36.6 °C)-99 °F (37.2 °C)] 99 °F (37.2 °C)  Pulse:  [] 90  Resp:  [16-20] 18  SpO2:  [96 %-100 %] 96 %  BP: ()/(53-87) 95/53     Weight: 79.2 kg (174 lb 9.7 oz)  Body mass index is 29.06 kg/m².    Intake/Output Summary (Last 24 hours) at 1/5/2022 2340  Last data filed at 1/5/2022 1750  Gross per 24 hour   Intake 200 ml   Output 1050 ml   Net -850 ml      Physical Exam  Vitals and nursing note reviewed.   Constitutional:       General: She is not in acute distress.     Appearance: She is obese.   Cardiovascular:      Rate and Rhythm: Normal rate.   Pulmonary:      Effort: Pulmonary effort is normal. No respiratory distress.   Musculoskeletal:      Cervical back: Normal range of motion.      Comments: Bilateral leg amputations   Skin:     General: Skin is dry.      Comments: Right leg BKA   Neurological:      Mental Status: She is alert and oriented to person, place, and time.      Motor: Weakness present.   Psychiatric:         Behavior: Behavior normal.         Thought Content:  Thought content normal.      Comments: Depressed about losing leg         Significant Labs:   All pertinent labs within the past 24 hours have been reviewed.  BMP:   Recent Labs   Lab 01/04/22  1238   *      K 3.8      CO2 20*   BUN 46*   CREATININE 1.4   CALCIUM 8.0*       Significant Imaging: I have reviewed all pertinent imaging results/findings within the past 24 hours.      Assessment/Plan:      * S/P BKA (below knee amputation) unilateral, right  Gangrene  Osteomyelitis  Diabetic Foot ulcer  PVD    - Dry gangrene right foot, osteo on MRI on presentation  - started on vanc, flagyl, and cefepime  - eliquis held  - ID, cardiology, and general surgery consulted  - BKA on 12/28  - antibiotics stopped post BKA per ID recs  - pain meds adjusted  - nutritional supplements added for wound healing  - PT/OT consulted  - discussed with general surgery, reports wound looks good post operatively  - restart vanc and zosyn   - lactic and blood cultures ordered--NGTD  - WBC uptrending, ID re-consulted 1/2/22  - IPR if remains stable       Osteomyelitis of right foot  See s/p BKA      Diabetic ulcer of right foot associated with type 2 diabetes mellitus  See s/p BKA      Cellulitis  See S/p BKA        Stage 3 chronic kidney disease due to type 2 diabetes mellitus  - slightly worsening, U/A and urine lytes ordered  - Renally dose meds      Chronic diastolic heart failure  - hold lasix due to hypotension  - denies SOB on room air  - no hypoxia noted      Chronic anemia  - Hgb drop from 13 to 7 over 2 month period  - Type and screen  - Hold plavix and eliquis (had not been taking)  - Denies acute blood loss  - s/p 1 U PRBC 12/23 and again 1/1      Hypokalemia  replete      Critical lower limb ischemia  - Had intervention to RLE in October  - patient non-compliant with medications, wound care, and follow up appts  - Persistent non-healing diabetic right foot wounds   - Arterial US showing stenosis   - Cardiology  following  - BKA on 12/28/21              Paroxysmal atrial fibrillation  - NSR on tele  - Noncompliant with eliquis  - Cont to hold for now s/p RLE amptutation      Type 2 diabetes mellitus with hyperglycemia, with long-term current use of insulin  - Hold home meds  - Detemir 5u nightly  - Accuchecks and ISS  - A1C 9.6        MDD (major depressive disorder), recurrent episode, moderate  Expresses depression and anger about losing both of her legs this year despite being compliant and doing everything asked of her. (She presented with gangrenous foot after not changing her wound dressing, not attending any follow up appts, and not taking medications for months)  Consult psych for eval, Appreciate recs  increase remeron to 15mg at night  Start zoloft 50mg daily  D/c lexapro      Essential hypertension  - stop amlodipine 5mg daily for now due to soft blood pressures, resume as able  - Monitor pressures  - given IVF on 1/1 and 1/2 due to soft BP      Gangrene  See S/p right BKA          VTE Risk Mitigation (From admission, onward)         Ordered     Reason for No Pharmacological VTE Prophylaxis  Once        Question:  Reasons:  Answer:  Already adequately anticoagulated on oral Anticoagulants    12/21/21 1910     IP VTE HIGH RISK PATIENT  Once         12/21/21 1910                      I have assessed these finding virtually using telemed platform and with assistance of bedside nurse                 The attending portion of this evaluation, treatment, and documentation was performed per Ruben Flood MD via Telemedicine AudioVisual using the secure [employer] software platform with 2 way audio/video. The provider was located off-site and the patient is located in the hospital. The aforementioned video software was utilized to document the relevant history and physical exam    Ruben Flood MD  Department of Hospital Medicine   [place] [facility]

## 2022-01-06 NOTE — NURSING
Patient lying in bed resting with eyes closed.  Patient turned every two hours throughout shift.  Rounded every hour on patient no complaints of pain noted. Will continue to monitor and give report to oncoming nurse.

## 2022-01-06 NOTE — PLAN OF CARE
Recommendation:   1. Encourage intake of meals & supplements as tolerated.   2. Monitor weight/labs.   3. RD to continue to follow to monitor po intake    Goals:   Pt to consume at least 50-75% intake at meals by RD follow up  Nutrition Goal Status: new

## 2022-01-07 ENCOUNTER — TELEPHONE (OUTPATIENT)
Dept: INTERNAL MEDICINE | Facility: CLINIC | Age: 57
End: 2022-01-07
Payer: MEDICARE

## 2022-01-07 LAB
ANION GAP SERPL CALC-SCNC: 6 MMOL/L (ref 8–16)
ANION GAP SERPL CALC-SCNC: 6 MMOL/L (ref 8–16)
BACTERIA BLD CULT: NORMAL
BACTERIA BLD CULT: NORMAL
BASOPHILS # BLD AUTO: 0.09 K/UL (ref 0–0.2)
BASOPHILS NFR BLD: 0.8 % (ref 0–1.9)
BUN SERPL-MCNC: 25 MG/DL (ref 6–20)
BUN SERPL-MCNC: 25 MG/DL (ref 6–20)
CALCIUM SERPL-MCNC: 7.9 MG/DL (ref 8.7–10.5)
CALCIUM SERPL-MCNC: 7.9 MG/DL (ref 8.7–10.5)
CHLORIDE SERPL-SCNC: 108 MMOL/L (ref 95–110)
CHLORIDE SERPL-SCNC: 109 MMOL/L (ref 95–110)
CO2 SERPL-SCNC: 23 MMOL/L (ref 23–29)
CO2 SERPL-SCNC: 25 MMOL/L (ref 23–29)
CREAT SERPL-MCNC: 0.9 MG/DL (ref 0.5–1.4)
CREAT SERPL-MCNC: 0.9 MG/DL (ref 0.5–1.4)
DIFFERENTIAL METHOD: ABNORMAL
EOSINOPHIL # BLD AUTO: 0.4 K/UL (ref 0–0.5)
EOSINOPHIL NFR BLD: 3.5 % (ref 0–8)
ERYTHROCYTE [DISTWIDTH] IN BLOOD BY AUTOMATED COUNT: 14.9 % (ref 11.5–14.5)
EST. GFR  (AFRICAN AMERICAN): >60 ML/MIN/1.73 M^2
EST. GFR  (AFRICAN AMERICAN): >60 ML/MIN/1.73 M^2
EST. GFR  (NON AFRICAN AMERICAN): >60 ML/MIN/1.73 M^2
EST. GFR  (NON AFRICAN AMERICAN): >60 ML/MIN/1.73 M^2
GLUCOSE SERPL-MCNC: 255 MG/DL (ref 70–110)
GLUCOSE SERPL-MCNC: 258 MG/DL (ref 70–110)
HCT VFR BLD AUTO: 24.8 % (ref 37–48.5)
HGB BLD-MCNC: 7.9 G/DL (ref 12–16)
IMM GRANULOCYTES # BLD AUTO: 0.1 K/UL (ref 0–0.04)
IMM GRANULOCYTES NFR BLD AUTO: 0.9 % (ref 0–0.5)
LYMPHOCYTES # BLD AUTO: 1.9 K/UL (ref 1–4.8)
LYMPHOCYTES NFR BLD: 16.9 % (ref 18–48)
MCH RBC QN AUTO: 31.2 PG (ref 27–31)
MCHC RBC AUTO-ENTMCNC: 31.9 G/DL (ref 32–36)
MCV RBC AUTO: 98 FL (ref 82–98)
MONOCYTES # BLD AUTO: 1.2 K/UL (ref 0.3–1)
MONOCYTES NFR BLD: 10.6 % (ref 4–15)
NEUTROPHILS # BLD AUTO: 7.6 K/UL (ref 1.8–7.7)
NEUTROPHILS NFR BLD: 67.3 % (ref 38–73)
NRBC BLD-RTO: 0 /100 WBC
PLATELET # BLD AUTO: 648 K/UL (ref 150–450)
PMV BLD AUTO: 11.5 FL (ref 9.2–12.9)
POCT GLUCOSE: 121 MG/DL (ref 70–110)
POCT GLUCOSE: 195 MG/DL (ref 70–110)
POCT GLUCOSE: 238 MG/DL (ref 70–110)
POCT GLUCOSE: 261 MG/DL (ref 70–110)
POTASSIUM SERPL-SCNC: 3.9 MMOL/L (ref 3.5–5.1)
POTASSIUM SERPL-SCNC: 4 MMOL/L (ref 3.5–5.1)
RBC # BLD AUTO: 2.53 M/UL (ref 4–5.4)
SODIUM SERPL-SCNC: 138 MMOL/L (ref 136–145)
SODIUM SERPL-SCNC: 139 MMOL/L (ref 136–145)
WBC # BLD AUTO: 11.27 K/UL (ref 3.9–12.7)

## 2022-01-07 PROCEDURE — 85025 COMPLETE CBC W/AUTO DIFF WBC: CPT | Performed by: STUDENT IN AN ORGANIZED HEALTH CARE EDUCATION/TRAINING PROGRAM

## 2022-01-07 PROCEDURE — 11000001 HC ACUTE MED/SURG PRIVATE ROOM

## 2022-01-07 PROCEDURE — 27000207 HC ISOLATION

## 2022-01-07 PROCEDURE — 97530 THERAPEUTIC ACTIVITIES: CPT

## 2022-01-07 PROCEDURE — 25000003 PHARM REV CODE 250: Performed by: PSYCHIATRY & NEUROLOGY

## 2022-01-07 PROCEDURE — 97530 THERAPEUTIC ACTIVITIES: CPT | Mod: CQ

## 2022-01-07 PROCEDURE — 25000003 PHARM REV CODE 250: Performed by: NURSE PRACTITIONER

## 2022-01-07 PROCEDURE — 25000003 PHARM REV CODE 250: Performed by: STUDENT IN AN ORGANIZED HEALTH CARE EDUCATION/TRAINING PROGRAM

## 2022-01-07 PROCEDURE — 36415 COLL VENOUS BLD VENIPUNCTURE: CPT | Performed by: STUDENT IN AN ORGANIZED HEALTH CARE EDUCATION/TRAINING PROGRAM

## 2022-01-07 PROCEDURE — 80048 BASIC METABOLIC PNL TOTAL CA: CPT | Performed by: STUDENT IN AN ORGANIZED HEALTH CARE EDUCATION/TRAINING PROGRAM

## 2022-01-07 PROCEDURE — 97535 SELF CARE MNGMENT TRAINING: CPT

## 2022-01-07 RX ORDER — PREGABALIN 75 MG/1
75 CAPSULE ORAL 3 TIMES DAILY
Status: DISCONTINUED | OUTPATIENT
Start: 2022-01-07 | End: 2022-01-09

## 2022-01-07 RX ADMIN — OXYCODONE AND ACETAMINOPHEN 1 TABLET: 10; 325 TABLET ORAL at 12:01

## 2022-01-07 RX ADMIN — INSULIN ASPART 1 UNITS: 100 INJECTION, SOLUTION INTRAVENOUS; SUBCUTANEOUS at 08:01

## 2022-01-07 RX ADMIN — ALLOPURINOL 100 MG: 100 TABLET ORAL at 08:01

## 2022-01-07 RX ADMIN — MIRTAZAPINE 15 MG: 7.5 TABLET ORAL at 08:01

## 2022-01-07 RX ADMIN — OXYCODONE AND ACETAMINOPHEN 1 TABLET: 10; 325 TABLET ORAL at 08:01

## 2022-01-07 RX ADMIN — INSULIN DETEMIR 5 UNITS: 100 INJECTION, SOLUTION SUBCUTANEOUS at 08:01

## 2022-01-07 RX ADMIN — INSULIN ASPART 2 UNITS: 100 INJECTION, SOLUTION INTRAVENOUS; SUBCUTANEOUS at 12:01

## 2022-01-07 RX ADMIN — OXYCODONE AND ACETAMINOPHEN 1 TABLET: 10; 325 TABLET ORAL at 01:01

## 2022-01-07 RX ADMIN — OXYCODONE AND ACETAMINOPHEN 1 TABLET: 10; 325 TABLET ORAL at 09:01

## 2022-01-07 RX ADMIN — ATORVASTATIN CALCIUM 80 MG: 40 TABLET, FILM COATED ORAL at 08:01

## 2022-01-07 RX ADMIN — OXYCODONE AND ACETAMINOPHEN 1 TABLET: 10; 325 TABLET ORAL at 04:01

## 2022-01-07 RX ADMIN — SERTRALINE HYDROCHLORIDE 50 MG: 50 TABLET ORAL at 08:01

## 2022-01-07 RX ADMIN — PREGABALIN 50 MG: 50 CAPSULE ORAL at 08:01

## 2022-01-07 RX ADMIN — PREGABALIN 75 MG: 75 CAPSULE ORAL at 03:01

## 2022-01-07 RX ADMIN — AMOXICILLIN AND CLAVULANATE POTASSIUM 1 TABLET: 875; 125 TABLET, FILM COATED ORAL at 08:01

## 2022-01-07 RX ADMIN — PREGABALIN 75 MG: 75 CAPSULE ORAL at 08:01

## 2022-01-07 NOTE — PROGRESS NOTES
[facility]  Telemedicine Progress Note    Patient Name: Suyapa Connelly  MRN: [id number]  Patient Class: IP- Inpatient   Admission Date: 12/21/2021  Length of Stay: 16 days  Attending Physician: Ruben Flood MD  Primary Care Provider: Magen Christensen MD          Subjective:     Principal Problem:S/P BKA (below knee amputation) unilateral, right        HPI:  Suyapa Connelly is a 57 yo female with a pmh of DM2, PVD, osteomyelitis, right AKA, CKD3, CAD s/p CABG, afib on eliquis. She presented with right foot pain x 1 month. She has an ulcer to her right heel. Presented with dressing to right heel which was removed and had purulent foul smelling drainage noted. The dressing was not changed in 2 months since she last saw Dr. Askew on 10/22/21 for arthrectomy, thrombectomy, and PTA with DCB to right lower extremity. Had not gone to follow up appts. She now has what appears to be dry gangrene to right toes with bone exposure. She has not been compliant with medications and states she had not taken eliquis in over a month. She states she has not been able to stand due to right leg weakness since her left AKA in May 2021. She also has a pressure wound to the lateral right lower leg. She takes her 's percocet for pain, which was helping some.       Overview/Hospital Course:  Patient admitted with osteomyelitis of RLE and gangrene, ID and podiatry consulted and patient was started on IV vancomycin and zosyn. Podiatry recommended general surgery consult for BKA, which was done. Patient continued to have leukocytosis so remains on IV antibiotics. PT/OT recommending rehab. ID re-consulted on 1/2 due to persistent leukocytosis and repeat blood cultures, CXR, and U/A ordered. Continuing IV zosyn because patient is allergic to ciprofloxacin - appreciate ID recs.De-escalated to PO augmentin on 1/3/22      Interval History: Patient much more alert and interactive today, pain controlled  with PO medications and one time dose of oxy IR. Family at bedside also noticing improvement. Appreciate wound care recs. Awaiting rehab placement, will repeat labs for tomorrow.     Review of Systems   Constitutional: Negative for chills and fever.   Respiratory: Negative for cough and shortness of breath.    Cardiovascular: Negative for chest pain and leg swelling.   Gastrointestinal: Negative for abdominal pain and nausea.   Genitourinary: Negative for difficulty urinating.   Musculoskeletal: Positive for arthralgias and myalgias.        Right leg pain   Skin: Positive for wound.   Neurological: Positive for weakness.   Psychiatric/Behavioral: Positive for dysphoric mood.        Depressed     Objective:     Vital Signs (Most Recent):  Temp: 98.4 °F (36.9 °C) (01/06/22 2131)  Pulse: 89 (01/06/22 2131)  Resp: 18 (01/06/22 2131)  BP: 129/69 (01/06/22 2131)  SpO2: 98 % (01/06/22 2131) Vital Signs (24h Range):  Temp:  [97.5 °F (36.4 °C)-99 °F (37.2 °C)] 98.4 °F (36.9 °C)  Pulse:  [] 89  Resp:  [8-18] 18  SpO2:  [97 %-100 %] 98 %  BP: (121-180)/(60-84) 129/69     Weight: 79.2 kg (174 lb 9.7 oz)  Body mass index is 29.06 kg/m².    Intake/Output Summary (Last 24 hours) at 1/6/2022 2143  Last data filed at 1/6/2022 1802  Gross per 24 hour   Intake 236 ml   Output 1200 ml   Net -964 ml      Physical Exam  Vitals and nursing note reviewed.   Constitutional:       General: She is not in acute distress.     Appearance: She is obese.   Cardiovascular:      Rate and Rhythm: Normal rate.   Pulmonary:      Effort: Pulmonary effort is normal. No respiratory distress.   Musculoskeletal:      Cervical back: Normal range of motion.      Comments: Bilateral leg amputations   Skin:     General: Skin is dry.      Comments: Right leg BKA   Neurological:      Mental Status: She is alert and oriented to person, place, and time.      Motor: Weakness present.   Psychiatric:         Behavior: Behavior normal.         Thought Content:  Thought content normal.      Comments: Depressed about losing leg         Significant Labs:   All pertinent labs within the past 24 hours have been reviewed.  BMP:   No results for input(s): GLU, NA, K, CL, CO2, BUN, CREATININE, CALCIUM, MG in the last 48 hours.    Significant Imaging: I have reviewed all pertinent imaging results/findings within the past 24 hours.      Assessment/Plan:      * S/P BKA (below knee amputation) unilateral, right  Gangrene  Osteomyelitis  Diabetic Foot ulcer  PVD    - Dry gangrene right foot, osteo on MRI on presentation  - started on vanc, flagyl, and cefepime  - eliquis held  - ID, cardiology, and general surgery consulted  - BKA on 12/28  - antibiotics stopped post BKA per ID recs  - pain meds adjusted  - nutritional supplements added for wound healing  - PT/OT consulted  - discussed with general surgery, reports wound looks good post operatively  - restart vanc and zosyn   - lactic and blood cultures ordered--NGTD  - WBC uptrending, ID re-consulted 1/2/22  - IPR if remains stable       Osteomyelitis of right foot  See s/p BKA      Diabetic ulcer of right foot associated with type 2 diabetes mellitus  See s/p BKA      Cellulitis  See S/p BKA        Stage 3 chronic kidney disease due to type 2 diabetes mellitus  - slightly worsening, U/A and urine lytes ordered  - Renally dose meds      Chronic diastolic heart failure  - hold lasix due to hypotension  - denies SOB on room air  - no hypoxia noted      Chronic anemia  - Hgb drop from 13 to 7 over 2 month period  - Type and screen  - Hold plavix and eliquis (had not been taking)  - Denies acute blood loss  - s/p 1 U PRBC 12/23 and again 1/1      Hypokalemia  replete      Critical lower limb ischemia  - Had intervention to RLE in October  - patient non-compliant with medications, wound care, and follow up appts  - Persistent non-healing diabetic right foot wounds   - Arterial US showing stenosis   - Cardiology following  - BKA on  12/28/21              Paroxysmal atrial fibrillation  - NSR on tele  - Noncompliant with eliquis  - Cont to hold for now s/p RLE amptutation      Type 2 diabetes mellitus with hyperglycemia, with long-term current use of insulin  - Hold home meds  - Detemir 5u nightly  - Accuchecks and ISS  - A1C 9.6        MDD (major depressive disorder), recurrent episode, moderate  Expresses depression and anger about losing both of her legs this year despite being compliant and doing everything asked of her. (She presented with gangrenous foot after not changing her wound dressing, not attending any follow up appts, and not taking medications for months)  Consult psych for eval, Appreciate recs  increase remeron to 15mg at night  Start zoloft 50mg daily  D/c lexapro      Essential hypertension  - stop amlodipine 5mg daily for now due to soft blood pressures, resume as able  - Monitor pressures  - given IVF on 1/1 and 1/2 due to soft BP      Gangrene  See S/p right BKA          VTE Risk Mitigation (From admission, onward)         Ordered     Reason for No Pharmacological VTE Prophylaxis  Once        Question:  Reasons:  Answer:  Already adequately anticoagulated on oral Anticoagulants    12/21/21 1910     IP VTE HIGH RISK PATIENT  Once         12/21/21 1910                      I have assessed these finding virtually using telemed platform and with assistance of bedside nurse                 The attending portion of this evaluation, treatment, and documentation was performed per Ruben Flood MD via Telemedicine AudioVisual using the secure [employer] software platform with 2 way audio/video. The provider was located off-site and the patient is located in the hospital. The aforementioned video software was utilized to document the relevant history and physical exam    Ruben Flood MD  Department of Hospital Medicine   [place] [facility]

## 2022-01-07 NOTE — TELEPHONE ENCOUNTER
----- Message from Luisana ALY Sanju sent at 1/7/2022 12:21 PM CST -----  Regarding: FW: Hosp f/u  Message sent yesterday to schedule patient.  Please advise so I can update Discharge Nurse.  Thank you.  ----- Message -----  From: Luisana ALY Sanju  Sent: 1/6/2022   2:31 PM CST  To: [facility] Us Staff  Subject: Hosp f/u                                         Patient is discharging from [facility] and is needing a hospital f/u scheduled in 7 days.  Please schedule and message me back so that Discharge Nurse can relay appointment information to patient prior to discharge.    DX: S/P BKA (below knee amputation) unilateral, right    Thanks, Beata  Access Navigator/Discharge

## 2022-01-07 NOTE — PROGRESS NOTES
"Surgery follow up  BP (!) 158/67 (BP Location: Right arm, Patient Position: Lying)   Pulse 98   Temp 98.5 °F (36.9 °C) (Oral)   Resp 18   Ht 5' 5" (1.651 m)   Wt 79.2 kg (174 lb 9.7 oz)   LMP 09/30/2015 (Exact Date)   SpO2 100%   BMI 29.06 kg/m²   I/O last 3 completed shifts:  In: 200 [P.O.:200]  Out: 1050 [Urine:1050]  I/O this shift:  In: 236 [P.O.:236]  Out: 1200 [Urine:1200]  On PT/OT  Wound better   Recent Results (from the past 336 hour(s))   CBC Auto Differential    Collection Time: 01/04/22 12:38 PM   Result Value Ref Range    WBC 15.74 (H) 3.90 - 12.70 K/uL    Hemoglobin 8.7 (L) 12.0 - 16.0 g/dL    Hematocrit 28.4 (L) 37.0 - 48.5 %    Platelets 572 (H) 150 - 450 K/uL   CBC auto differential    Collection Time: 01/03/22  5:19 AM   Result Value Ref Range    WBC 14.90 (H) 3.90 - 12.70 K/uL    Hemoglobin 8.0 (L) 12.0 - 16.0 g/dL    Hematocrit 26.4 (L) 37.0 - 48.5 %    Platelets 570 (H) 150 - 450 K/uL   CBC Auto Differential    Collection Time: 01/02/22  5:20 AM   Result Value Ref Range    WBC 17.96 (H) 3.90 - 12.70 K/uL    Hemoglobin 8.1 (L) 12.0 - 16.0 g/dL    Hematocrit 26.0 (L) 37.0 - 48.5 %    Platelets 575 (H) 150 - 450 K/uL   continue present treatment.    "

## 2022-01-07 NOTE — PLAN OF CARE
I left a VM with Cee [phone] with [facility] to check status of auth and status of them accepting pt.  Waiting on call back.       01/07/22 0856   Post-Acute Status   Post-Acute Authorization Placement     Marty Bruno RN,   [phone]

## 2022-01-07 NOTE — SUBJECTIVE & OBJECTIVE
Interval History: Patient much more alert and interactive today, pain controlled with PO medications. Awaiting rehab placement.    Review of Systems   Constitutional: Negative for chills and fever.   Respiratory: Negative for cough and shortness of breath.    Cardiovascular: Negative for chest pain and leg swelling.   Gastrointestinal: Negative for abdominal pain and nausea.   Genitourinary: Negative for difficulty urinating.   Musculoskeletal: Positive for arthralgias and myalgias.        Right leg pain   Skin: Positive for wound.   Neurological: Positive for weakness.   Psychiatric/Behavioral: Positive for dysphoric mood.        Depressed     Objective:     Vital Signs (Most Recent):  Temp: 98 °F (36.7 °C) (01/07/22 1715)  Pulse: 98 (01/07/22 1715)  Resp: 18 (01/07/22 1715)  BP: 134/86 (01/07/22 1715)  SpO2: 100 % (01/07/22 1715) Vital Signs (24h Range):  Temp:  [96.8 °F (36 °C)-98.4 °F (36.9 °C)] 98 °F (36.7 °C)  Pulse:  [] 98  Resp:  [8-18] 18  SpO2:  [98 %-100 %] 100 %  BP: (110-169)/(65-86) 134/86     Weight: 79.2 kg (174 lb 9.7 oz)  Body mass index is 29.06 kg/m².    Intake/Output Summary (Last 24 hours) at 1/7/2022 1725  Last data filed at 1/7/2022 1100  Gross per 24 hour   Intake --   Output 1150 ml   Net -1150 ml      Physical Exam  Vitals and nursing note reviewed.   Constitutional:       General: She is not in acute distress.     Appearance: She is obese.   Cardiovascular:      Rate and Rhythm: Normal rate.   Pulmonary:      Effort: Pulmonary effort is normal. No respiratory distress.   Musculoskeletal:      Cervical back: Normal range of motion.      Comments: Bilateral leg amputations   Skin:     General: Skin is dry.      Comments: Right leg BKA   Neurological:      Mental Status: She is alert and oriented to person, place, and time.      Motor: Weakness present.   Psychiatric:         Behavior: Behavior normal.         Thought Content: Thought content normal.      Comments: Depressed about  losing leg         Significant Labs:   All pertinent labs within the past 24 hours have been reviewed.  BMP:   Recent Labs   Lab 01/07/22  1251   *      K 3.9      CO2 25   BUN 25*   CREATININE 0.9   CALCIUM 7.9*       Significant Imaging: I have reviewed all pertinent imaging results/findings within the past 24 hours.

## 2022-01-07 NOTE — PLAN OF CARE
[employer] auth for IPR still pending.    Future Appointments   Date Time Provider Department Center   4/4/2022  2:30 PM [facility]        01/07/22 1143   Post-Acute Status   Post-Acute Authorization Placement     Marty Bruno RN, CM  [phone]

## 2022-01-07 NOTE — SUBJECTIVE & OBJECTIVE
Interval History: Patient much more alert and interactive today, pain controlled with PO medications and one time dose of oxy IR. Family at bedside also noticing improvement. Appreciate wound care recs. Awaiting rehab placement, will repeat labs for tomorrow.     Review of Systems   Constitutional: Negative for chills and fever.   Respiratory: Negative for cough and shortness of breath.    Cardiovascular: Negative for chest pain and leg swelling.   Gastrointestinal: Negative for abdominal pain and nausea.   Genitourinary: Negative for difficulty urinating.   Musculoskeletal: Positive for arthralgias and myalgias.        Right leg pain   Skin: Positive for wound.   Neurological: Positive for weakness.   Psychiatric/Behavioral: Positive for dysphoric mood.        Depressed     Objective:     Vital Signs (Most Recent):  Temp: 98.4 °F (36.9 °C) (01/06/22 2131)  Pulse: 89 (01/06/22 2131)  Resp: 18 (01/06/22 2131)  BP: 129/69 (01/06/22 2131)  SpO2: 98 % (01/06/22 2131) Vital Signs (24h Range):  Temp:  [97.5 °F (36.4 °C)-99 °F (37.2 °C)] 98.4 °F (36.9 °C)  Pulse:  [] 89  Resp:  [8-18] 18  SpO2:  [97 %-100 %] 98 %  BP: (121-180)/(60-84) 129/69     Weight: 79.2 kg (174 lb 9.7 oz)  Body mass index is 29.06 kg/m².    Intake/Output Summary (Last 24 hours) at 1/6/2022 2143  Last data filed at 1/6/2022 1802  Gross per 24 hour   Intake 236 ml   Output 1200 ml   Net -964 ml      Physical Exam  Vitals and nursing note reviewed.   Constitutional:       General: She is not in acute distress.     Appearance: She is obese.   Cardiovascular:      Rate and Rhythm: Normal rate.   Pulmonary:      Effort: Pulmonary effort is normal. No respiratory distress.   Musculoskeletal:      Cervical back: Normal range of motion.      Comments: Bilateral leg amputations   Skin:     General: Skin is dry.      Comments: Right leg BKA   Neurological:      Mental Status: She is alert and oriented to person, place, and time.      Motor: Weakness  present.   Psychiatric:         Behavior: Behavior normal.         Thought Content: Thought content normal.      Comments: Depressed about losing leg         Significant Labs:   All pertinent labs within the past 24 hours have been reviewed.  BMP:   No results for input(s): GLU, NA, K, CL, CO2, BUN, CREATININE, CALCIUM, MG in the last 48 hours.    Significant Imaging: I have reviewed all pertinent imaging results/findings within the past 24 hours.

## 2022-01-07 NOTE — PROGRESS NOTES
"Surgery follow up  /73 (BP Location: Right arm, Patient Position: Lying)   Pulse 91   Temp 96.8 °F (36 °C) (Oral)   Resp 16   Ht 5' 5" (1.651 m)   Wt 79.2 kg (174 lb 9.7 oz)   LMP 09/30/2015 (Exact Date)   SpO2 98%   BMI 29.06 kg/m²   I/O last 3 completed shifts:  In: 236 [P.O.:236]  Out: 1200 [Urine:1200]  I/O this shift:  In: -   Out: 550 [Urine:550]  Recent Results (from the past 336 hour(s))   CBC auto differential    Collection Time: 01/07/22  7:03 AM   Result Value Ref Range    WBC 11.27 3.90 - 12.70 K/uL    Hemoglobin 7.9 (L) 12.0 - 16.0 g/dL    Hematocrit 24.8 (L) 37.0 - 48.5 %    Platelets 648 (H) 150 - 450 K/uL   CBC Auto Differential    Collection Time: 01/04/22 12:38 PM   Result Value Ref Range    WBC 15.74 (H) 3.90 - 12.70 K/uL    Hemoglobin 8.7 (L) 12.0 - 16.0 g/dL    Hematocrit 28.4 (L) 37.0 - 48.5 %    Platelets 572 (H) 150 - 450 K/uL   CBC auto differential    Collection Time: 01/03/22  5:19 AM   Result Value Ref Range    WBC 14.90 (H) 3.90 - 12.70 K/uL    Hemoglobin 8.0 (L) 12.0 - 16.0 g/dL    Hematocrit 26.4 (L) 37.0 - 48.5 %    Platelets 570 (H) 150 - 450 K/uL   wound dry and intact  Continue present treatment   On P/T, O/T  "

## 2022-01-07 NOTE — PROGRESS NOTES
[facility] Medicine  Telemedicine Progress Note    Patient Name: Suyapa Connelly  MRN: [id number]  Patient Class: IP- Inpatient   Admission Date: 12/21/2021  Length of Stay: 17 days  Attending Physician: Ruben Flood MD  Primary Care Provider: Magen Christensen MD          Subjective:     Principal Problem:S/P BKA (below knee amputation) unilateral, right        HPI:  Suyapa Connelly is a 57 yo female with a pmh of DM2, PVD, osteomyelitis, right AKA, CKD3, CAD s/p CABG, afib on eliquis. She presented with right foot pain x 1 month. She has an ulcer to her right heel. Presented with dressing to right heel which was removed and had purulent foul smelling drainage noted. The dressing was not changed in 2 months since she last saw Dr. Askew on 10/22/21 for arthrectomy, thrombectomy, and PTA with DCB to right lower extremity. Had not gone to follow up appts. She now has what appears to be dry gangrene to right toes with bone exposure. She has not been compliant with medications and states she had not taken eliquis in over a month. She states she has not been able to stand due to right leg weakness since her left AKA in May 2021. She also has a pressure wound to the lateral right lower leg. She takes her 's percocet for pain, which was helping some.       Overview/Hospital Course:  Patient admitted with osteomyelitis of RLE and gangrene, ID and podiatry consulted and patient was started on IV vancomycin and zosyn. Podiatry recommended general surgery consult for BKA, which was done. Patient continued to have leukocytosis so remains on IV antibiotics. PT/OT recommending rehab. ID re-consulted on 1/2 due to persistent leukocytosis and repeat blood cultures, CXR, and U/A ordered. Continuing IV zosyn because patient is allergic to ciprofloxacin - appreciate ID recs.De-escalated to PO augmentin on 1/3/22      Interval History: Patient much more alert and interactive today, pain controlled  with PO medications. Awaiting rehab placement.    Review of Systems   Constitutional: Negative for chills and fever.   Respiratory: Negative for cough and shortness of breath.    Cardiovascular: Negative for chest pain and leg swelling.   Gastrointestinal: Negative for abdominal pain and nausea.   Genitourinary: Negative for difficulty urinating.   Musculoskeletal: Positive for arthralgias and myalgias.        Right leg pain   Skin: Positive for wound.   Neurological: Positive for weakness.   Psychiatric/Behavioral: Positive for dysphoric mood.        Depressed     Objective:     Vital Signs (Most Recent):  Temp: 98 °F (36.7 °C) (01/07/22 1715)  Pulse: 98 (01/07/22 1715)  Resp: 18 (01/07/22 1715)  BP: 134/86 (01/07/22 1715)  SpO2: 100 % (01/07/22 1715) Vital Signs (24h Range):  Temp:  [96.8 °F (36 °C)-98.4 °F (36.9 °C)] 98 °F (36.7 °C)  Pulse:  [] 98  Resp:  [8-18] 18  SpO2:  [98 %-100 %] 100 %  BP: (110-169)/(65-86) 134/86     Weight: 79.2 kg (174 lb 9.7 oz)  Body mass index is 29.06 kg/m².    Intake/Output Summary (Last 24 hours) at 1/7/2022 1725  Last data filed at 1/7/2022 1100  Gross per 24 hour   Intake --   Output 1150 ml   Net -1150 ml      Physical Exam  Vitals and nursing note reviewed.   Constitutional:       General: She is not in acute distress.     Appearance: She is obese.   Cardiovascular:      Rate and Rhythm: Normal rate.   Pulmonary:      Effort: Pulmonary effort is normal. No respiratory distress.   Musculoskeletal:      Cervical back: Normal range of motion.      Comments: Bilateral leg amputations   Skin:     General: Skin is dry.      Comments: Right leg BKA   Neurological:      Mental Status: She is alert and oriented to person, place, and time.      Motor: Weakness present.   Psychiatric:         Behavior: Behavior normal.         Thought Content: Thought content normal.      Comments: Depressed about losing leg         Significant Labs:   All pertinent labs within the past 24 hours  have been reviewed.  BMP:   Recent Labs   Lab 01/07/22  1251   *      K 3.9      CO2 25   BUN 25*   CREATININE 0.9   CALCIUM 7.9*       Significant Imaging: I have reviewed all pertinent imaging results/findings within the past 24 hours.      Assessment/Plan:      * S/P BKA (below knee amputation) unilateral, right  Gangrene  Osteomyelitis  Diabetic Foot ulcer  PVD    - Dry gangrene right foot, osteo on MRI on presentation  - started on vanc, flagyl, and cefepime  - eliquis held  - ID, cardiology, and general surgery consulted  - BKA on 12/28  - antibiotics stopped post BKA per ID recs  - pain meds adjusted  - nutritional supplements added for wound healing  - PT/OT consulted  - discussed with general surgery, reports wound looks good post operatively  - restart vanc and zosyn   - lactic and blood cultures ordered--NGTD  - WBC uptrending, ID re-consulted 1/2/22  - IPR if remains stable       Osteomyelitis of right foot  See s/p BKA      Diabetic ulcer of right foot associated with type 2 diabetes mellitus  See s/p BKA      Cellulitis  See S/p BKA        Stage 3 chronic kidney disease due to type 2 diabetes mellitus  - slightly worsening, U/A and urine lytes ordered  - Renally dose meds      Chronic diastolic heart failure  - hold lasix due to hypotension  - denies SOB on room air  - no hypoxia noted      Chronic anemia  - Hgb drop from 13 to 7 over 2 month period  - Type and screen  - Hold plavix and eliquis (had not been taking)  - Denies acute blood loss  - s/p 1 U PRBC 12/23 and again 1/1      Hypokalemia  replete      Critical lower limb ischemia  - Had intervention to RLE in October  - patient non-compliant with medications, wound care, and follow up appts  - Persistent non-healing diabetic right foot wounds   - Arterial US showing stenosis   - Cardiology following  - BKA on 12/28/21              Paroxysmal atrial fibrillation  - NSR on tele  - Noncompliant with eliquis  - Cont to hold for  now s/p RLE amptutation      Type 2 diabetes mellitus with hyperglycemia, with long-term current use of insulin  - Hold home meds  - Detemir 5u nightly  - Accuchecks and ISS  - A1C 9.6        MDD (major depressive disorder), recurrent episode, moderate  Expresses depression and anger about losing both of her legs this year despite being compliant and doing everything asked of her. (She presented with gangrenous foot after not changing her wound dressing, not attending any follow up appts, and not taking medications for months)  Consult psych for eval, Appreciate recs  increase remeron to 15mg at night  Start zoloft 50mg daily  D/c lexapro      Essential hypertension  - stop amlodipine 5mg daily for now due to soft blood pressures, resume as able  - Monitor pressures  - given IVF on 1/1 and 1/2 due to soft BP      Gangrene  See S/p right BKA          VTE Risk Mitigation (From admission, onward)         Ordered     Reason for No Pharmacological VTE Prophylaxis  Once        Question:  Reasons:  Answer:  Already adequately anticoagulated on oral Anticoagulants    12/21/21 1910     IP VTE HIGH RISK PATIENT  Once         12/21/21 1910                      I have assessed these finding virtually using telemed platform and with assistance of bedside nurse                 The attending portion of this evaluation, treatment, and documentation was performed per Ruben Flood MD via Telemedicine AudioVisual using the secure FindIt software platform with 2 way audio/video. The provider was located off-site and the patient is located in the hospital. The aforementioned video software was utilized to document the relevant history and physical exam    Ruben Flood MD  Department of Hospital Medicine   [facility] - Telemetry

## 2022-01-08 LAB
POCT GLUCOSE: 148 MG/DL (ref 70–110)
POCT GLUCOSE: 201 MG/DL (ref 70–110)
POCT GLUCOSE: 227 MG/DL (ref 70–110)
POCT GLUCOSE: 234 MG/DL (ref 70–110)

## 2022-01-08 PROCEDURE — 11000001 HC ACUTE MED/SURG PRIVATE ROOM

## 2022-01-08 PROCEDURE — 25000003 PHARM REV CODE 250: Performed by: PSYCHIATRY & NEUROLOGY

## 2022-01-08 PROCEDURE — 25000003 PHARM REV CODE 250: Performed by: STUDENT IN AN ORGANIZED HEALTH CARE EDUCATION/TRAINING PROGRAM

## 2022-01-08 PROCEDURE — 27000207 HC ISOLATION

## 2022-01-08 PROCEDURE — 97530 THERAPEUTIC ACTIVITIES: CPT | Mod: CQ

## 2022-01-08 PROCEDURE — 25000003 PHARM REV CODE 250: Performed by: NURSE PRACTITIONER

## 2022-01-08 PROCEDURE — 25000003 PHARM REV CODE 250: Performed by: SURGERY

## 2022-01-08 RX ORDER — TALC
9 POWDER (GRAM) TOPICAL NIGHTLY PRN
Status: DISCONTINUED | OUTPATIENT
Start: 2022-01-08 | End: 2022-01-10 | Stop reason: HOSPADM

## 2022-01-08 RX ADMIN — PREGABALIN 75 MG: 75 CAPSULE ORAL at 08:01

## 2022-01-08 RX ADMIN — OXYCODONE AND ACETAMINOPHEN 1 TABLET: 10; 325 TABLET ORAL at 08:01

## 2022-01-08 RX ADMIN — INSULIN ASPART 2 UNITS: 100 INJECTION, SOLUTION INTRAVENOUS; SUBCUTANEOUS at 12:01

## 2022-01-08 RX ADMIN — ATORVASTATIN CALCIUM 80 MG: 40 TABLET, FILM COATED ORAL at 08:01

## 2022-01-08 RX ADMIN — ALLOPURINOL 100 MG: 100 TABLET ORAL at 09:01

## 2022-01-08 RX ADMIN — PREGABALIN 75 MG: 75 CAPSULE ORAL at 02:01

## 2022-01-08 RX ADMIN — OXYCODONE AND ACETAMINOPHEN 1 TABLET: 10; 325 TABLET ORAL at 12:01

## 2022-01-08 RX ADMIN — OXYCODONE AND ACETAMINOPHEN 1 TABLET: 10; 325 TABLET ORAL at 04:01

## 2022-01-08 RX ADMIN — INSULIN ASPART 1 UNITS: 100 INJECTION, SOLUTION INTRAVENOUS; SUBCUTANEOUS at 08:01

## 2022-01-08 RX ADMIN — PREGABALIN 75 MG: 75 CAPSULE ORAL at 09:01

## 2022-01-08 RX ADMIN — OXYCODONE AND ACETAMINOPHEN 1 TABLET: 10; 325 TABLET ORAL at 01:01

## 2022-01-08 RX ADMIN — SERTRALINE HYDROCHLORIDE 50 MG: 50 TABLET ORAL at 09:01

## 2022-01-08 RX ADMIN — ACETAMINOPHEN 650 MG: 325 TABLET ORAL at 02:01

## 2022-01-08 RX ADMIN — MIRTAZAPINE 15 MG: 7.5 TABLET ORAL at 08:01

## 2022-01-08 RX ADMIN — INSULIN ASPART 2 UNITS: 100 INJECTION, SOLUTION INTRAVENOUS; SUBCUTANEOUS at 04:01

## 2022-01-08 RX ADMIN — INSULIN DETEMIR 5 UNITS: 100 INJECTION, SOLUTION SUBCUTANEOUS at 08:01

## 2022-01-08 RX ADMIN — AMOXICILLIN AND CLAVULANATE POTASSIUM 1 TABLET: 875; 125 TABLET, FILM COATED ORAL at 08:01

## 2022-01-08 RX ADMIN — AMOXICILLIN AND CLAVULANATE POTASSIUM 1 TABLET: 875; 125 TABLET, FILM COATED ORAL at 09:01

## 2022-01-08 RX ADMIN — OXYCODONE AND ACETAMINOPHEN 1 TABLET: 10; 325 TABLET ORAL at 06:01

## 2022-01-08 NOTE — PT/OT/SLP PROGRESS
Physical Therapy Treatment    Patient Name:  Suyapa Connelly   MRN:  [id number]    Recommendations:     Discharge Recommendations:  rehabilitation facility   Discharge Equipment Recommendations:  (TBD)   Barriers to discharge: Decreased functional mobility.     Assessment:     Suyapa Connelly is a 56 y.o. female admitted with a medical diagnosis of S/P BKA (below knee amputation) unilateral, right.  She presents with the following impairments/functional limitations:  weakness,impaired balance,impaired skin,impaired endurance,decreased coordination,decreased lower extremity function,impaired functional mobilty.    Rehab Prognosis: Good; patient would benefit from acute skilled PT services to address these deficits and reach maximum level of function.    Recent Surgery: Procedure(s) (LRB):  AMPUTATION, BELOW KNEE (Right) 11 Days Post-Op    Plan:     During this hospitalization, patient to be seen 5 x/week to address the identified rehab impairments via therapeutic activities,therapeutic exercises,neuromuscular re-education,wheelchair management/training and progress toward the following goals:    · Plan of Care Expires:  01/29/22    Subjective     Chief Complaint: Pt with no complaints at this time.   Patient/Family Comments/goals:   Pain/Comfort:  · Pain Rating 1:  (did not rate)      Objective:     Patient found HOB elevated with telemetry,PureWick upon PT entry to room.     General Precautions: Standard, fall,diabetic,contact   Orthopedic Precautions:N/A   Braces: N/A     Functional Mobility:  · Bed Mobility:     · Supine to Sit: stand by assistance  · Transfers:     · Bed to Wheelchair: stand by assistance with  no AD  using  Scoot Pivot  · Balance: Pt with good sitting balance.       AM-PAC 6 CLICK MOBILITY  Turning over in bed (including adjusting bedclothes, sheets and blankets)?: 3  Sitting down on and standing up from a chair with arms (e.g., wheelchair, bedside commode, etc.): 1  Moving from lying on back  to sitting on the side of the bed?: 3  Moving to and from a bed to a chair (including a wheelchair)?: 3  Need to walk in hospital room?: 1  Climbing 3-5 steps with a railing?: 1  Basic Mobility Total Score: 12       Therapeutic Activities and Exercises:       Patient left up in the wheelchair with all lines intact, call button in reach and family present..    GOALS:   Multidisciplinary Problems     Physical Therapy Goals        Problem: Physical Therapy Goal    Goal Priority Disciplines Outcome Goal Variances Interventions   Physical Therapy Goal     PT, PT/OT Ongoing, Progressing     Description: Goals to be met by: 22     Patient will increase functional independence with mobility by performin. Supine to sit with Minimal Assistance  2. Sit to supine with Minimal Assistance  3. Rolling to Left and Right with Stand-by Assistance.  4. Bed to WC transfer with Minimal Assistance using Slideboard.                     Time Tracking:     PT Received On: 22  PT Start Time: 1615     PT Stop Time: 1630  PT Total Time (min): 15 min     Billable Minutes: Therapeutic Activity 15    Treatment Type: Treatment  PT/PTA: PTA     PTA Visit Number: 3     2022

## 2022-01-08 NOTE — NURSING
Patient sitting up in bed watching television awake and alert. No acute distress noted at this time vital signs stable. Will continue to monitor.

## 2022-01-08 NOTE — PLAN OF CARE
"Pt has [employer] mac and cheese bowls and a can of coke at her bedside. Educated pt on the importance of maintaining a diabetic diet. Pt refused education, stating "They already took my legs I should be allowed to enjoy myself."  "

## 2022-01-08 NOTE — PLAN OF CARE
Problem: Occupational Therapy Goal  Goal: Occupational Therapy Goal  Description: Goals to be met by: 1/29/2022     Patient will increase functional independence with ADLs by performing:    UE Dressing with Modified [place].  Grooming while seated with Modified [place].  Toileting from bedside commode with Modified [place] for hygiene and clothing management.   Rolling to Bilateral with Modified [place].   Supine to sit with Modified [place].  Toilet transfer to bedside commode with Minimal Assistance.    Outcome: Ongoing, Progressing

## 2022-01-08 NOTE — PT/OT/SLP PROGRESS
Occupational Therapy   Treatment    Name: Suyapa Connelly  MRN: [id number]  Admitting Diagnosis:  S/P BKA (below knee amputation) unilateral, right  10 Days Post-Op  Pre-op Diagnosis: Cold foot [R20.9]  Gangrene [I96] s/p Procedure(s):  AMPUTATION, BELOW KNEE   The primary encounter diagnosis was Gangrene. Diagnoses of Pain, Cold foot, Cellulitis, unspecified cellulitis site, Osteomyelitis of right foot, unspecified type, Chest pain, Uremia, Osteomyelitis of right foot, Gangrene of extremity, Diabetic ulcer of right foot associated with type 2 diabetes mellitus, with necrosis of muscle, unspecified part of foot, Diabetic ulcer of right midfoot associated with type 2 diabetes mellitus, with muscle involvement without evidence of necrosis, QT prolongation, S/P BKA (below knee amputation) unilateral, right, and Diabetic ulcer of right foot associated with type 2 diabetes mellitus, with necrosis of bone, unspecified part of foot were also pertinent to this visit.    Recommendations:     Discharge Recommendations: rehabilitation facility  Discharge Equipment Recommendations:   (wide drop arm BSC, beasy board,wc with drop arm and elevating leg rest, pressure relieving wc cushion)  Barriers to discharge:       Assessment:     Suyapa Connelly is a 56 y.o. female with a medical diagnosis of S/P BKA (below knee amputation) unilateral, right.  She presents with Performance deficits affecting function are weakness,impaired endurance,impaired self care skills,impaired functional mobilty,gait instability,impaired balance,decreased coordination,decreased upper extremity function,decreased lower extremity function,decreased safety awareness,pain,decreased ROM,impaired skin,edema. Pt.ogressing OOB to wc with min/mod A x 2 person assist, increased time due RLE pain. Pt sat unsupported EOB to completed g/hygiene with SBA, RLE supported on chair with pilow with knee in extension. Continue with OT POC.     Rehab Prognosis:  Good;  patient would benefit from acute skilled OT services to address these deficits and reach maximum level of function.       Plan:     Patient to be seen 5 x/week to address the above listed problems via self-care/home management,therapeutic activities,therapeutic exercises  · Plan of Care Expires: 01/29/22  · Plan of Care Reviewed with: patient    Subjective     Pain/Comfort:  · Pain Rating 1:  (not rated, grimacing in pain with movement)  · Location - Side 1: Right  · Location - Orientation 1: generalized  · Location 1: leg  · Pain Addressed 1: Pre-medicate for activity,Reposition,Distraction,Cessation of Activity,Nurse notified  · Pain Rating Post-Intervention 1:  (not rated)    Objective:     Communicated with: nurse prior to session.  Patient found left sidelying with PureWick,telemetry,bed alarm upon OT entry to room.    General Precautions: Standard, fall,diabetic,contact   Orthopedic Precautions:N/A   Braces: N/A  Respiratory Status: Room air     Occupational Performance:     Bed Mobility:    · Patient completed Rolling/Turning to Left with  stand by assistance and with side rail  · Patient completed Rolling/Turning to Right with minimum assistance, with side rail and RLE assist, due to pain  · Patient completed Scooting/Bridging with contact guard assistance and overhead bed rails used  · Patient completed Supine to Sit with minimum assistance, with side rail and slow moving, increasd time due to RLE pain, assist with RLE movement over EOB  · Patient completed Sit to Supine with minimum assistance for RLE assist 2/2 apin    Functional Mobility/Transfers:  · Patient completed Bed <> Chair Transfer using Scoot Pivot technique with minimum assistance and moderate assistance with wheelchair with 2 person assist, increased time and effort; unleveled surface, bed matress deflated and wc forward facing bed as pt incrementally scooted hips around on bed first to laterally scoot into wc as OT guided hips and trunk and  PTA supported RLE.  Reversed steps to return to bed, higher incline, thus needing mod A x 2 over hump to bed..      Activities of Daily Living:  · Grooming: stand by assistance washed face, hands, applied lotions, combed hair, brushed teeth seated EOB  · Upper Body Dressing: minimum assistance to reach shirt around back , donned gown like robe seated unsupported EOB      [facility] 6 Click ADL: 17    Treatment & Education:  See above    Patient left right sidelying with all lines intact, call button in reach, bed alarm on and nurse notifiedEducation:      GOALS:   Multidisciplinary Problems     Occupational Therapy Goals        Problem: Occupational Therapy Goal    Goal Priority Disciplines Outcome Interventions   Occupational Therapy Goal     OT, PT/OT Ongoing, Progressing    Description: Goals to be met by: 1/29/2022     Patient will increase functional independence with ADLs by performing:    UE Dressing with Modified [place].  Grooming while seated with Modified [place].  Toileting from bedside commode with Modified [place] for hygiene and clothing management.   Rolling to Bilateral with Modified [place].   Supine to sit with Modified [place].  Toilet transfer to bedside commode with Minimal Assistance.                     Time Tracking:     OT Date of Treatment: 01/07/22  OT Start Time: 1351  OT Stop Time: 1414  OT Total Time (min): 23 min +45=68 cotx with PTA    Billable Minutes:Self Care/Home Management 23  Therapeutic Activity 15  Total Time 38    OT/MACKENZIE: OT     MACKENZIE Visit Number: 0    1/7/2022

## 2022-01-08 NOTE — PT/OT/SLP PROGRESS
Physical Therapy Treatment    Patient Name:  Suyapa Connelly   MRN:  [id number]    Recommendations:     Discharge Recommendations:  rehabilitation facility   Discharge Equipment Recommendations:  (if pt does not go to [facility] (wide bedside commode with drop arm, w/c with w/c eval for pt's specific needs,)   Barriers to discharge: decreased strength/functional mobility requiring increased assistance at thsi time    Assessment:     Suyapa Connelly is a 56 y.o. female admitted with a medical diagnosis of S/P BKA (below knee amputation) unilateral, right.  She presents with the following impairments/functional limitations:  weakness,impaired endurance,impaired self care skills,impaired functional mobilty,impaired balance,decreased coordination,pain,decreased lower extremity function,decreased ROM,decreased upper extremity function,decreased safety awareness,impaired skin,edema.  Pt would continue to benefit from P.T. To address impairments listed above, and assist pt's return to PLOF.  .    Rehab Prognosis: Fair; patient would benefit from acute skilled PT services to address these deficits and reach maximum level of function.    Recent Surgery: Procedure(s) (LRB):  AMPUTATION, BELOW KNEE (Right) 10 Days Post-Op    Plan:     During this hospitalization, patient to be seen 5 x/week to address the identified rehab impairments via therapeutic activities,therapeutic exercises,neuromuscular re-education,wheelchair management/training and progress toward the following goals:    · Plan of Care Expires:  01/29/22    Subjective       Patient/Family Comments/goals: Pt agreed to tx.  Pain/Comfort:  · Pain Rating 1:  (did not rate, but grimaced in pain with movement)  · Location - Orientation 1: generalized  · Location 1:  (RLE)  · Pain Addressed 1: Pre-medicate for activity,Reposition,Cessation of Activity,Nurse notified,Distraction  · Pain Rating Post-Intervention 1:  (no rated)      Objective:       Patient found sitting EOB with  O.T. present with Ken,telemetry upon PT entry to room.     General Precautions: Standard, fall   Orthopedic Precautions:N/A   Braces:    Respiratory Status: Room air     Functional Mobility:  · Bed Mobility:     · Rolling Left:  minimum assistance and for RLE  · Rolling Right: stand by assistance and with B/R  · Scooting: pt scooted EOB to w/c with Venecia of 2 and from w/c to EOB with Mod A of 2.  Scooting up to HOB with CGA/SBA using BUEs on headboard pulling up  · Sit to Supine: minimum assistance and RLE  · Transfers:     · Bed to Chair: bed to chair transfer with w/c positioned facing EOB and pt scooting by turning into chair with Venecia for RLE management secondary to pain and Venecia of another for stability and scooting back into chair.  W/c to EOB:  Pt initially scooting forward toward EOB and then turning into bed with Mod A of 2 secondary uphill incline.  VC's for technique with pt following well.      AM-PAC 6 CLICK MOBILITY  Turning over in bed (including adjusting bedclothes, sheets and blankets)?: 3  Sitting down on and standing up from a chair with arms (e.g., wheelchair, bedside commode, etc.): 1  Moving from lying on back to sitting on the side of the bed?: 3  Moving to and from a bed to a chair (including a wheelchair)?: 2  Need to walk in hospital room?: 1  Climbing 3-5 steps with a railing?: 1  Basic Mobility Total Score: 11       Therapeutic Activities and Exercises:   Pt sat EOB and performed self care acts with OT with right residual limb resting on seat of straight back chair LE elevated on pillow With SBA/S with PTA for dynamic sitting balance.  Pt was able to weight shift occasionally while sitting EOB.  Transfer as above.  Pt was positioned in right sidelying (OT stated pt was in left sidelying when she entered the room) with foam wedge against pt's back for support, pillow between residual limbs for pressure relief and positioning.  Pt left positioned in right sidelying for pressure relief  on pt's back, sacrum, and buttock.      Patient left right sidelying with all lines intact, call button in reach, bed alarm on and RN notified..    GOALS:   Multidisciplinary Problems     Physical Therapy Goals        Problem: Physical Therapy Goal    Goal Priority Disciplines Outcome Goal Variances Interventions   Physical Therapy Goal     PT, PT/OT Ongoing, Progressing     Description: Goals to be met by: 22     Patient will increase functional independence with mobility by performin. Supine to sit with Minimal Assistance  2. Sit to supine with Minimal Assistance  3. Rolling to Left and Right with Stand-by Assistance.  4. Bed to WC transfer with Minimal Assistance using Slideboard.                     Time Tracking:     PT Received On: 22  PT Start Time: 1415     PT Stop Time: 1500  PT Total Time (min): 45 min     Billable Minutes: Therapeutic Activity 23  co tx with O.t. 22 mins       PT/PTA: PTA     PTA Visit Number: 2     2022

## 2022-01-09 LAB
POCT GLUCOSE: 176 MG/DL (ref 70–110)
POCT GLUCOSE: 212 MG/DL (ref 70–110)
POCT GLUCOSE: 260 MG/DL (ref 70–110)
POCT GLUCOSE: 271 MG/DL (ref 70–110)

## 2022-01-09 PROCEDURE — 27000207 HC ISOLATION

## 2022-01-09 PROCEDURE — 25000003 PHARM REV CODE 250: Performed by: NURSE PRACTITIONER

## 2022-01-09 PROCEDURE — 25000003 PHARM REV CODE 250: Performed by: SURGERY

## 2022-01-09 PROCEDURE — 25000003 PHARM REV CODE 250: Performed by: PSYCHIATRY & NEUROLOGY

## 2022-01-09 PROCEDURE — 11000001 HC ACUTE MED/SURG PRIVATE ROOM

## 2022-01-09 PROCEDURE — 25000003 PHARM REV CODE 250: Performed by: STUDENT IN AN ORGANIZED HEALTH CARE EDUCATION/TRAINING PROGRAM

## 2022-01-09 RX ORDER — PREGABALIN 75 MG/1
150 CAPSULE ORAL 3 TIMES DAILY
Status: DISCONTINUED | OUTPATIENT
Start: 2022-01-09 | End: 2022-01-10 | Stop reason: HOSPADM

## 2022-01-09 RX ADMIN — PREGABALIN 75 MG: 75 CAPSULE ORAL at 09:01

## 2022-01-09 RX ADMIN — OXYCODONE AND ACETAMINOPHEN 1 TABLET: 10; 325 TABLET ORAL at 10:01

## 2022-01-09 RX ADMIN — AMOXICILLIN AND CLAVULANATE POTASSIUM 1 TABLET: 875; 125 TABLET, FILM COATED ORAL at 09:01

## 2022-01-09 RX ADMIN — ALLOPURINOL 100 MG: 100 TABLET ORAL at 09:01

## 2022-01-09 RX ADMIN — PREGABALIN 150 MG: 75 CAPSULE ORAL at 02:01

## 2022-01-09 RX ADMIN — INSULIN DETEMIR 5 UNITS: 100 INJECTION, SOLUTION SUBCUTANEOUS at 08:01

## 2022-01-09 RX ADMIN — OXYCODONE AND ACETAMINOPHEN 1 TABLET: 10; 325 TABLET ORAL at 02:01

## 2022-01-09 RX ADMIN — PREGABALIN 150 MG: 75 CAPSULE ORAL at 08:01

## 2022-01-09 RX ADMIN — SERTRALINE HYDROCHLORIDE 50 MG: 50 TABLET ORAL at 09:01

## 2022-01-09 RX ADMIN — INSULIN ASPART 3 UNITS: 100 INJECTION, SOLUTION INTRAVENOUS; SUBCUTANEOUS at 01:01

## 2022-01-09 RX ADMIN — DOCUSATE SODIUM AND SENNOSIDES 1 TABLET: 8.6; 5 TABLET, FILM COATED ORAL at 08:01

## 2022-01-09 RX ADMIN — OXYCODONE AND ACETAMINOPHEN 1 TABLET: 10; 325 TABLET ORAL at 05:01

## 2022-01-09 RX ADMIN — OXYCODONE AND ACETAMINOPHEN 1 TABLET: 10; 325 TABLET ORAL at 06:01

## 2022-01-09 RX ADMIN — ATORVASTATIN CALCIUM 80 MG: 40 TABLET, FILM COATED ORAL at 08:01

## 2022-01-09 RX ADMIN — MIRTAZAPINE 15 MG: 7.5 TABLET ORAL at 08:01

## 2022-01-09 NOTE — NURSING
Patient lying in bed resting with eyes open. Vital signs stable. Turned every two hours throughout shift. No acute distress noted at this time. Will continue to monitor.

## 2022-01-09 NOTE — PLAN OF CARE
Plan of care reviewed with patient. Patient voiced understanding. NSR on monitor. No acute distress noted. Side rails x 3, bed in lowest position, call bell within reach, pt advised to call for assistance. Maintain bed alarm for patient safety. Wound care done.

## 2022-01-09 NOTE — SUBJECTIVE & OBJECTIVE
Interval History: Patient much more alert and interactive today, pain controlled with PO medications. Awaiting rehab placement.    Review of Systems   Constitutional: Negative for chills and fever.   Respiratory: Negative for cough and shortness of breath.    Cardiovascular: Negative for chest pain and leg swelling.   Gastrointestinal: Negative for abdominal pain and nausea.   Genitourinary: Negative for difficulty urinating.   Musculoskeletal: Positive for arthralgias and myalgias.        Right leg pain   Skin: Positive for wound.   Neurological: Positive for weakness.   Psychiatric/Behavioral: Positive for dysphoric mood.        Depressed     Objective:     Vital Signs (Most Recent):  Temp: 97.9 °F (36.6 °C) (01/09/22 0045)  Pulse: 96 (01/09/22 0045)  Resp: 18 (01/09/22 0045)  BP: (!) 149/79 (01/09/22 0045)  SpO2: 97 % (01/09/22 0045) Vital Signs (24h Range):  Temp:  [97.5 °F (36.4 °C)-98.7 °F (37.1 °C)] 97.9 °F (36.6 °C)  Pulse:  [] 96  Resp:  [16-18] 18  SpO2:  [96 %-100 %] 97 %  BP: (109-155)/(56-79) 149/79     Weight: 79.2 kg (174 lb 9.7 oz)  Body mass index is 29.06 kg/m².    Intake/Output Summary (Last 24 hours) at 1/9/2022 0229  Last data filed at 1/9/2022 0045  Gross per 24 hour   Intake --   Output 300 ml   Net -300 ml      Physical Exam  Vitals and nursing note reviewed.   Constitutional:       General: She is not in acute distress.     Appearance: She is obese.   Cardiovascular:      Rate and Rhythm: Normal rate.   Pulmonary:      Effort: Pulmonary effort is normal. No respiratory distress.   Musculoskeletal:      Cervical back: Normal range of motion.      Comments: Bilateral leg amputations   Skin:     General: Skin is dry.      Comments: Right leg BKA   Neurological:      Mental Status: She is alert and oriented to person, place, and time.      Motor: Weakness present.   Psychiatric:         Behavior: Behavior normal.         Thought Content: Thought content normal.      Comments: Depressed  about losing leg         Significant Labs:   All pertinent labs within the past 24 hours have been reviewed.  BMP:   Recent Labs   Lab 01/07/22  1251   *      K 3.9      CO2 25   BUN 25*   CREATININE 0.9   CALCIUM 7.9*       Significant Imaging: I have reviewed all pertinent imaging results/findings within the past 24 hours.

## 2022-01-09 NOTE — PROGRESS NOTES
"Surgery follow up  BP (!) 143/85 (BP Location: Right arm, Patient Position: Lying)   Pulse 89   Temp 97.6 °F (36.4 °C) (Oral)   Resp 20   Ht 5' 5" (1.651 m)   Wt 77.9 kg (171 lb 11.8 oz)   LMP 09/30/2015 (Exact Date)   SpO2 98%   BMI 28.58 kg/m²    I/O last 3 completed shifts:  In: -   Out: 300 [Urine:300]  No intake/output data recorded.  Recent Results (from the past 336 hour(s))   CBC auto differential    Collection Time: 01/07/22  7:03 AM   Result Value Ref Range    WBC 11.27 3.90 - 12.70 K/uL    Hemoglobin 7.9 (L) 12.0 - 16.0 g/dL    Hematocrit 24.8 (L) 37.0 - 48.5 %    Platelets 648 (H) 150 - 450 K/uL   CBC Auto Differential    Collection Time: 01/04/22 12:38 PM   Result Value Ref Range    WBC 15.74 (H) 3.90 - 12.70 K/uL    Hemoglobin 8.7 (L) 12.0 - 16.0 g/dL    Hematocrit 28.4 (L) 37.0 - 48.5 %    Platelets 572 (H) 150 - 450 K/uL   CBC auto differential    Collection Time: 01/03/22  5:19 AM   Result Value Ref Range    WBC 14.90 (H) 3.90 - 12.70 K/uL    Hemoglobin 8.0 (L) 12.0 - 16.0 g/dL    Hematocrit 26.4 (L) 37.0 - 48.5 %    Platelets 570 (H) 150 - 450 K/uL   wound better , continue present treatment.    "

## 2022-01-09 NOTE — PROGRESS NOTES
[facility] Medicine  Telemedicine Progress Note    Patient Name: Suyapa Connelly  MRN: [id number]  Patient Class: IP- Inpatient   Admission Date: 12/21/2021  Length of Stay: 19 days  Attending Physician: Ruben Flood MD  Primary Care Provider: Magen Christensen MD          Subjective:     Principal Problem:S/P BKA (below knee amputation) unilateral, right        HPI:  Suyapa Connelly is a 57 yo female with a pmh of DM2, PVD, osteomyelitis, right AKA, CKD3, CAD s/p CABG, afib on eliquis. She presented with right foot pain x 1 month. She has an ulcer to her right heel. Presented with dressing to right heel which was removed and had purulent foul smelling drainage noted. The dressing was not changed in 2 months since she last saw Dr. Askew on 10/22/21 for arthrectomy, thrombectomy, and PTA with DCB to right lower extremity. Had not gone to follow up appts. She now has what appears to be dry gangrene to right toes with bone exposure. She has not been compliant with medications and states she had not taken eliquis in over a month. She states she has not been able to stand due to right leg weakness since her left AKA in May 2021. She also has a pressure wound to the lateral right lower leg. She takes her 's percocet for pain, which was helping some.       Overview/Hospital Course:  Patient admitted with osteomyelitis of RLE and gangrene, ID and podiatry consulted and patient was started on IV vancomycin and zosyn. Podiatry recommended general surgery consult for BKA, which was done. Patient continued to have leukocytosis so remains on IV antibiotics. PT/OT recommending rehab. ID re-consulted on 1/2 due to persistent leukocytosis and repeat blood cultures, CXR, and U/A ordered. Continuing IV zosyn because patient is allergic to ciprofloxacin - appreciate ID recs.De-escalated to PO augmentin on 1/3/22      Interval History: Patient much more alert and interactive today, pain controlled  with PO medications. Awaiting rehab placement.    Review of Systems   Constitutional: Negative for chills and fever.   Respiratory: Negative for cough and shortness of breath.    Cardiovascular: Negative for chest pain and leg swelling.   Gastrointestinal: Negative for abdominal pain and nausea.   Genitourinary: Negative for difficulty urinating.   Musculoskeletal: Positive for arthralgias and myalgias.        Right leg pain   Skin: Positive for wound.   Neurological: Positive for weakness.   Psychiatric/Behavioral: Positive for dysphoric mood.        Depressed     Objective:     Vital Signs (Most Recent):  Temp: 97.9 °F (36.6 °C) (01/09/22 0045)  Pulse: 96 (01/09/22 0045)  Resp: 18 (01/09/22 0045)  BP: (!) 149/79 (01/09/22 0045)  SpO2: 97 % (01/09/22 0045) Vital Signs (24h Range):  Temp:  [97.5 °F (36.4 °C)-98.7 °F (37.1 °C)] 97.9 °F (36.6 °C)  Pulse:  [] 96  Resp:  [16-18] 18  SpO2:  [96 %-100 %] 97 %  BP: (109-155)/(56-79) 149/79     Weight: 79.2 kg (174 lb 9.7 oz)  Body mass index is 29.06 kg/m².    Intake/Output Summary (Last 24 hours) at 1/9/2022 0229  Last data filed at 1/9/2022 0045  Gross per 24 hour   Intake --   Output 300 ml   Net -300 ml      Physical Exam  Vitals and nursing note reviewed.   Constitutional:       General: She is not in acute distress.     Appearance: She is obese.   Cardiovascular:      Rate and Rhythm: Normal rate.   Pulmonary:      Effort: Pulmonary effort is normal. No respiratory distress.   Musculoskeletal:      Cervical back: Normal range of motion.      Comments: Bilateral leg amputations   Skin:     General: Skin is dry.      Comments: Right leg BKA   Neurological:      Mental Status: She is alert and oriented to person, place, and time.      Motor: Weakness present.   Psychiatric:         Behavior: Behavior normal.         Thought Content: Thought content normal.      Comments: Depressed about losing leg         Significant Labs:   All pertinent labs within the past 24  hours have been reviewed.  BMP:   Recent Labs   Lab 01/07/22  1251   *      K 3.9      CO2 25   BUN 25*   CREATININE 0.9   CALCIUM 7.9*       Significant Imaging: I have reviewed all pertinent imaging results/findings within the past 24 hours.      Assessment/Plan:      * S/P BKA (below knee amputation) unilateral, right  Gangrene  Osteomyelitis  Diabetic Foot ulcer  PVD    - Dry gangrene right foot, osteo on MRI on presentation  - started on vanc, flagyl, and cefepime  - eliquis held  - ID, cardiology, and general surgery consulted  - BKA on 12/28  - antibiotics stopped post BKA per ID recs  - pain meds adjusted  - nutritional supplements added for wound healing  - PT/OT consulted  - discussed with general surgery, reports wound looks good post operatively  - restart vanc and zosyn   - lactic and blood cultures ordered--NGTD  - WBC uptrending, ID re-consulted 1/2/22  - IPR if remains stable       Osteomyelitis of right foot  See s/p BKA      Diabetic ulcer of right foot associated with type 2 diabetes mellitus  See s/p BKA      Cellulitis  See S/p BKA        Stage 3 chronic kidney disease due to type 2 diabetes mellitus  - slightly worsening, U/A and urine lytes ordered  - Renally dose meds      Chronic diastolic heart failure  - hold lasix due to hypotension  - denies SOB on room air  - no hypoxia noted      Chronic anemia  - Hgb drop from 13 to 7 over 2 month period  - Type and screen  - Hold plavix and eliquis (had not been taking)  - Denies acute blood loss  - s/p 1 U PRBC 12/23 and again 1/1      Hypokalemia  replete      Critical lower limb ischemia  - Had intervention to RLE in October  - patient non-compliant with medications, wound care, and follow up appts  - Persistent non-healing diabetic right foot wounds   - Arterial US showing stenosis   - Cardiology following  - BKA on 12/28/21              Paroxysmal atrial fibrillation  - NSR on tele  - Noncompliant with eliquis  - Cont to hold  for now s/p RLE amptutation      Type 2 diabetes mellitus with hyperglycemia, with long-term current use of insulin  - Hold home meds  - Detemir 5u nightly  - Accuchecks and ISS  - A1C 9.6        MDD (major depressive disorder), recurrent episode, moderate  Expresses depression and anger about losing both of her legs this year despite being compliant and doing everything asked of her. (She presented with gangrenous foot after not changing her wound dressing, not attending any follow up appts, and not taking medications for months)  Consult psych for eval, Appreciate recs  increase remeron to 15mg at night  Start zoloft 50mg daily  D/c lexapro      Essential hypertension  - stop amlodipine 5mg daily for now due to soft blood pressures, resume as able  - Monitor pressures  - given IVF on 1/1 and 1/2 due to soft BP      Gangrene  See S/p right BKA          VTE Risk Mitigation (From admission, onward)         Ordered     Reason for No Pharmacological VTE Prophylaxis  Once        Question:  Reasons:  Answer:  Already adequately anticoagulated on oral Anticoagulants    12/21/21 1910     IP VTE HIGH RISK PATIENT  Once         12/21/21 1910                      I have assessed these finding virtually using telemed platform and with assistance of bedside nurse                 The attending portion of this evaluation, treatment, and documentation was performed per Ruben Flood MD via Telemedicine AudioVisual using the secure [employer] software platform with 2 way audio/video. The provider was located off-site and the patient is located in the hospital. The aforementioned video software was utilized to document the relevant history and physical exam    Ruben Flood MD  Department of Hospital Medicine   [place] - Telemetry

## 2022-01-09 NOTE — PLAN OF CARE
Problem: Adult Inpatient Plan of Care  Goal: Plan of Care Review  Outcome: Ongoing, Progressing     POC reviewed with patient. No change in condition. Patient centered care maintained. Hourly rounding performed. Pain assessment performed. PRN pain medicine administered. Q2 turns and weight shift encouraged.  Accuchecks AC/HS. Dressing to RLE clean dry and intact. Mepilex to sacrum. Incontinence pad changed as needed and moisture barrier applied to perineum. Safety precautions initiated and maintained. Bed locked and in lowest position. Side rails up X 2. Bed alarm on. Call light and bedside table within reach. Will continue to monitor.

## 2022-01-10 VITALS
WEIGHT: 171.75 LBS | RESPIRATION RATE: 18 BRPM | SYSTOLIC BLOOD PRESSURE: 105 MMHG | OXYGEN SATURATION: 99 % | HEART RATE: 98 BPM | DIASTOLIC BLOOD PRESSURE: 52 MMHG | HEIGHT: 65 IN | TEMPERATURE: 99 F | BODY MASS INDEX: 28.61 KG/M2

## 2022-01-10 LAB
ANION GAP SERPL CALC-SCNC: 7 MMOL/L (ref 8–16)
BASOPHILS # BLD AUTO: 0.08 K/UL (ref 0–0.2)
BASOPHILS NFR BLD: 0.8 % (ref 0–1.9)
BUN SERPL-MCNC: 33 MG/DL (ref 6–20)
CALCIUM SERPL-MCNC: 8.1 MG/DL (ref 8.7–10.5)
CHLORIDE SERPL-SCNC: 107 MMOL/L (ref 95–110)
CO2 SERPL-SCNC: 24 MMOL/L (ref 23–29)
CREAT SERPL-MCNC: 0.9 MG/DL (ref 0.5–1.4)
DIFFERENTIAL METHOD: ABNORMAL
EOSINOPHIL # BLD AUTO: 0.3 K/UL (ref 0–0.5)
EOSINOPHIL NFR BLD: 2.7 % (ref 0–8)
ERYTHROCYTE [DISTWIDTH] IN BLOOD BY AUTOMATED COUNT: 14.1 % (ref 11.5–14.5)
EST. GFR  (AFRICAN AMERICAN): >60 ML/MIN/1.73 M^2
EST. GFR  (NON AFRICAN AMERICAN): >60 ML/MIN/1.73 M^2
FINAL PATHOLOGIC DIAGNOSIS: NORMAL
GLUCOSE SERPL-MCNC: 138 MG/DL (ref 70–110)
GROSS: NORMAL
HCT VFR BLD AUTO: 25.7 % (ref 37–48.5)
HGB BLD-MCNC: 8 G/DL (ref 12–16)
IMM GRANULOCYTES # BLD AUTO: 0.09 K/UL (ref 0–0.04)
IMM GRANULOCYTES NFR BLD AUTO: 0.8 % (ref 0–0.5)
LYMPHOCYTES # BLD AUTO: 2.1 K/UL (ref 1–4.8)
LYMPHOCYTES NFR BLD: 19.5 % (ref 18–48)
Lab: NORMAL
MCH RBC QN AUTO: 30.4 PG (ref 27–31)
MCHC RBC AUTO-ENTMCNC: 31.1 G/DL (ref 32–36)
MCV RBC AUTO: 98 FL (ref 82–98)
MONOCYTES # BLD AUTO: 0.9 K/UL (ref 0.3–1)
MONOCYTES NFR BLD: 8.4 % (ref 4–15)
NEUTROPHILS # BLD AUTO: 7.2 K/UL (ref 1.8–7.7)
NEUTROPHILS NFR BLD: 67.8 % (ref 38–73)
NRBC BLD-RTO: 0 /100 WBC
PLATELET # BLD AUTO: 579 K/UL (ref 150–450)
PMV BLD AUTO: 10.8 FL (ref 9.2–12.9)
POCT GLUCOSE: 136 MG/DL (ref 70–110)
POCT GLUCOSE: 164 MG/DL (ref 70–110)
POCT GLUCOSE: 194 MG/DL (ref 70–110)
POTASSIUM SERPL-SCNC: 4.3 MMOL/L (ref 3.5–5.1)
RBC # BLD AUTO: 2.63 M/UL (ref 4–5.4)
SODIUM SERPL-SCNC: 138 MMOL/L (ref 136–145)
WBC # BLD AUTO: 10.59 K/UL (ref 3.9–12.7)

## 2022-01-10 PROCEDURE — 85025 COMPLETE CBC W/AUTO DIFF WBC: CPT | Performed by: STUDENT IN AN ORGANIZED HEALTH CARE EDUCATION/TRAINING PROGRAM

## 2022-01-10 PROCEDURE — 80048 BASIC METABOLIC PNL TOTAL CA: CPT | Performed by: STUDENT IN AN ORGANIZED HEALTH CARE EDUCATION/TRAINING PROGRAM

## 2022-01-10 PROCEDURE — 25000003 PHARM REV CODE 250: Performed by: STUDENT IN AN ORGANIZED HEALTH CARE EDUCATION/TRAINING PROGRAM

## 2022-01-10 PROCEDURE — 25000003 PHARM REV CODE 250: Performed by: NURSE PRACTITIONER

## 2022-01-10 PROCEDURE — 36415 COLL VENOUS BLD VENIPUNCTURE: CPT | Performed by: STUDENT IN AN ORGANIZED HEALTH CARE EDUCATION/TRAINING PROGRAM

## 2022-01-10 PROCEDURE — 25000003 PHARM REV CODE 250: Performed by: PSYCHIATRY & NEUROLOGY

## 2022-01-10 RX ORDER — MIRTAZAPINE 7.5 MG/1
15 TABLET, FILM COATED ORAL NIGHTLY
Qty: 60 TABLET | Refills: 11 | Status: ON HOLD
Start: 2022-01-10 | End: 2022-08-11

## 2022-01-10 RX ORDER — SERTRALINE HYDROCHLORIDE 50 MG/1
50 TABLET, FILM COATED ORAL DAILY
Qty: 30 TABLET | Refills: 11 | Status: SHIPPED | OUTPATIENT
Start: 2022-01-11 | End: 2022-05-03

## 2022-01-10 RX ORDER — FUROSEMIDE 40 MG/1
40 TABLET ORAL DAILY
Status: ON HOLD
Start: 2022-01-10 | End: 2022-02-03 | Stop reason: SDUPTHER

## 2022-01-10 RX ORDER — PREGABALIN 50 MG/1
150 CAPSULE ORAL 3 TIMES DAILY
Qty: 90 CAPSULE | Refills: 0 | Status: ON HOLD
Start: 2022-01-10 | End: 2022-02-03 | Stop reason: HOSPADM

## 2022-01-10 RX ADMIN — SERTRALINE HYDROCHLORIDE 50 MG: 50 TABLET ORAL at 08:01

## 2022-01-10 RX ADMIN — ALLOPURINOL 100 MG: 100 TABLET ORAL at 08:01

## 2022-01-10 RX ADMIN — PREGABALIN 150 MG: 75 CAPSULE ORAL at 08:01

## 2022-01-10 RX ADMIN — OXYCODONE AND ACETAMINOPHEN 1 TABLET: 10; 325 TABLET ORAL at 08:01

## 2022-01-10 NOTE — PHYSICIAN QUERY
PT Name: Suyapa Connelly  MR #: [id number]     DOCUMENTATION CLARIFICATION     CDS/: DIONISIO Camarillo, RN               Contact information:[email or web address]  This form is a permanent document in the medical record.     Query Date: January 10, 2022    By submitting this query, we are merely seeking further clarification of documentation.  Please utilize your independent clinical judgment when addressing the question(s) below.    The Medical Record contains the following:   Indicators   Supporting Clinical Findings Location in Medical Record    Non-blanchable erythema/redness      x Ulcer/Injury/Skin Breakdown Sacrum- scattered partial and healing full thickness wounds related to moisture Wound Care Consults Note  01/06/2022      Deep Tissue Injury      x Wound care consult Inpatient consult to Wound Care ordered by Ruben Flood MD at 01/04/22 1410   Wound Care Consults Note  01/06/2022    x Acute/Chronic Illness S/P BKA (below knee amputation) unilateral, right  Gangrene  Osteomyelitis  Diabetic Foot ulcer  PVD  Osteomyelitis of right foot  Diabetic ulcer of right foot associated with type 2 diabetes mellitus  Cellulitis  Stage 3 chronic kidney disease due to type 2 diabetes mellitus  Chronic diastolic heart failure  Chronic anemia  Hypokalemia  Critical lower limb ischemia  Paroxysmal atrial fibrillation  Type 2 diabetes mellitus with hyperglycemia, with long-term current use of insulin  MDD (major depressive disorder), recurrent episode, moderate   Essential hypertension   Hospital Medicine Progress Notes 01/09/2022    x Medication/Treatment Recommendations discussed with pt, nurse and MD team- long discussion with pt regarding her diabetes, infection, and surgeries allowing pt to discuss her feelings and provided her with encouragement.   - Pressure injury prevention intervention- to include waffle overlay and repositioning every 2 hours  - Triad ointment BID and prn incontinent episode Wound Care  Consults Note  01/06/2022    Other       Wound Care Consults Note  01/06/2022         The clinical guidelines noted are only a system guideline. It does not replace the providers clinical judgment.    Per the National Pressure Injury Advisory Panel:   A pressure injury is localized damage to the skin and underlying soft tissue usually over a bony prominence or related to a medical or other device. The injury can present as intact skin or an open ulcer and may be painful. The injury occurs as a result of intense and/or prolonged pressure or pressure in combination with shear. The tolerance of soft tissue for pressure and shear may also be affected by microclimate, nutrition, perfusion, co-morbidities and condition of the soft tissue.       Stage 1 Pressure Injury:  Intact skin with a localized area of non-blanchable erythema, which may appear differently in darkly pigmented skin. Color changes do not include purple or maroon discoloration; these may indicate deep tissue pressure injury.    Stage 2 Pressure Injury:  Partial-thickness loss of skin with exposed dermis. The wound bed is viable, pink or red, moist, and may also present as an intact or ruptured serum-filled blister.    Stage 3 Pressure Injury:  Full-thickness loss of skin, in which adipose (fat) is visible in the ulcer and granulation tissue and epibole (rolled wound edges) are often present. Slough and/or eschar may be visible. Undermining and tunneling may occur.    Stage 4 Pressure Injury:  Full-thickness skin and tissue loss with exposed or directly palpable fascia, muscle, tendon, ligament, cartilage or bone in the ulcer. Slough and/or eschar may be visible. Epibole (rolled edges), undermining and/or tunneling often occur.    Unstageable Pressure Injury:  Full-thickness skin and tissue loss in which the extent of tissue damage within the ulcer cannot be confirmed because it is obscured by slough or eschar. If slough or eschar is removed, a Stage 3  or Stage 4 pressure injury will be revealed.    Deep Tissue Pressure Injury:  Intact or non-intact skin with localized area of persistent non-blanchable deep red, maroon, purple discoloration or epidermal separation revealing a dark wound bed or blood filled blister. This injury results from intense and/or prolonged pressure and shear forces at the bone-muscle interface. The wound may evolve rapidly to reveal the actual extent of tissue injury, or may resolve without tissue loss. If necrotic tissue, subcutaneous tissue, granulation tissue, fascia, muscle or other underlying structures are visible, this indicates a full thickness pressure injury (Unstageable, Stage 3 or Stage 4). Do not use DTPI to describe vascular, traumatic, neuropathic, or dermatologic conditions.   Medical Device Related Pressure Injury: This describes an etiology. Medical device related pressure injuries result from the use of devices designed and applied for diagnostic or therapeutic purposes. The resultant pressure injury generally conforms to the pattern or shape of the device. The injury should be staged using the staging system.    Mucosal Membrane Pressure Injury: Mucosal membrane pressure injury is found on mucous membranes with a history of a medical device in use at the location of the injury. Due to the anatomy of the tissue these ulcers cannot be staged.       Provider, please provide the integumentary diagnosis related to the documentation of (CDI: IDENTIFY SITE):     [  x ] Pressure Injury/Decubitus Ulcer, Stage 2   [   ] Pressure Injury/Decubitus Ulcer, Stage 3   [   ] Pressure Injury/Decubitus Ulcer, Unspecified Stage   [   ] Moisture associated dermatitis   [   ] Non-pressure ulcer, skin breakdown only   [   ] Non-pressure ulcer, exposed fat layer   [   ] Non-pressure ulcer, other depth (please specify): ___________   [   ] Other Integumentary Diagnosis (please specify):______________   [  ] Clinically Undetermined       Present  on admission (POA) status:    [  x ] Yes (Y)   [   ] No (N)   [   ] Documentation insufficient to determine if condition is POA (U)   [  ] Clinically Undetermined (W)       Please document in your progress notes daily for the duration of treatment until resolved and include in your discharge summary.    Reference:    EMILY Vega., CALVIN Manjarrez., Goldberg, M., EMILY Kirkland, EMILY Arias, & CONCEPCIÓN Wagner. (2016). Revised National Pressure Ulcer Advisory Panel Pressure Injury Staging System: Revised Pressure Injury Staging System. J Wound Ostomy Continence Nurs, 43(6), 585-597. doi:10.1097/[id number]    Form No.[id number]

## 2022-01-10 NOTE — PLAN OF CARE
[facility]    FACILITY TRANSFER ORDERS      Patient Name: Suyapa Connelly  YOB: 1965    PCP: Magen Christensen MD   PCP Address: [address] [place] [address]  PCP Phone Number: [phone]  PCP Fax: [phone]    Encounter Date: 01/10/2022    Admit to: [facility]    Vital Signs:  Routine    Diagnoses:   Active Hospital Problems    Diagnosis  POA    *S/P BKA (below knee amputation) unilateral, right [Z89.511]  Not Applicable    Osteomyelitis of right foot [M86.9]  Yes    Cellulitis [L03.90]  Yes    Diabetic ulcer of right foot associated with type 2 diabetes mellitus [E11.621, L97.519]  Yes    Stage 3 chronic kidney disease due to type 2 diabetes mellitus [E11.22, N18.30]  Yes    Chronic anemia [D64.9]  Yes     Formatting of this note might be different from the original.  Last Assessment & Plan:   Formatting of this note might be different from the original.  Chronic disease - MCV & MCH normal; monitor - denies acute blood loss, no indication for transfusion at this time. Plan to transfuse if <7 with active bleeding      Hypokalemia [E87.6]  No    Critical lower limb ischemia [I70.229]  Yes    Paroxysmal atrial fibrillation [I48.0]  Yes     Chronic    Type 2 diabetes mellitus with hyperglycemia, with long-term current use of insulin [E11.65, Z79.4]  Not Applicable    Chronic diastolic heart failure [I50.32]  Yes     Formatting of this note might be different from the original.  Last Assessment & Plan:   Formatting of this note might be different from the original.  -- Continue following with cardiology.      MDD (major depressive disorder), recurrent episode, moderate [F33.1]  Yes     Chronic    Essential hypertension [I10]  Yes     Chronic    Gangrene [I96]  Yes      Resolved Hospital Problems   No resolved problems to display.       Allergies:  Review of patient's allergies indicates:   Allergen Reactions    Ciprofloxacin Itching    Contrast media       Kidney injury    Iodine      Kidney injury       Diet: cardiac diet and diabetic diet: 2000 calorie    Activities: Activity as tolerated    Nursing: Per facility protocol      Labs: Per facility protocol    CONSULTS:    Physical Therapy to evaluate and treat. , Occupational Therapy to evaluate and treat. and  to evaluate for community resources/long-range planning.    MISCELLANEOUS CARE:  Diabetes Care:   SN to perform and educate Diabetic management with blood glucose monitoring:, Fingerstick blood sugar AC and HS and Report CBG < 60 or > 350 to physician.    WOUND CARE ORDERS  Sacrum: Nursing to cleanse sacrum and apply thin layer of Triad ointment BID and prn cleansing of incontinent episode. Do not apply cover dressing. Limit use of diapering.    R BKA:   For altered skin integrity with non-blanchable redness - apply foam dressing, cleanse area with normal saline, apply a no sting barrier film to the periwound skin, apply foam dressing. Change dressing twice weekly    Medications: Review discharge medications with patient and family and provide education.      Current Discharge Medication List      START taking these medications    Details   sertraline (ZOLOFT) 50 MG tablet Take 1 tablet (50 mg total) by mouth once daily.  Qty: 30 tablet, Refills: 11         CONTINUE these medications which have CHANGED    Details   furosemide (LASIX) 40 MG tablet Take 1 tablet (40 mg total) by mouth once daily.      mirtazapine (REMERON) 7.5 MG Tab Take 2 tablets (15 mg total) by mouth nightly.  Qty: 60 tablet, Refills: 11      pregabalin (LYRICA) 50 MG capsule Take 3 capsules (150 mg total) by mouth 3 (three) times daily.  Qty: 90 capsule, Refills: 0    Associated Diagnoses: Type 2 diabetes mellitus with diabetic nephropathy, with long-term current use of insulin         CONTINUE these medications which have NOT CHANGED    Details   acetaminophen (TYLENOL) 500 MG tablet Take 2 tablets (1,000 mg total) by  mouth every 8 (eight) hours as needed for Pain.  Refills: 0      allopurinoL (ZYLOPRIM) 100 MG tablet Take 1 tablet (100 mg total) by mouth once daily.  Qty: 30 tablet, Refills: 5      atorvastatin (LIPITOR) 80 MG tablet Take 1 tablet (80 mg total) by mouth every evening.  Qty: 90 tablet, Refills: 3      clopidogreL (PLAVIX) 75 mg tablet Take 1 tablet (75 mg total) by mouth once daily.  Qty: 90 tablet, Refills: 3      insulin aspart U-100 (NOVOLOG FLEXPEN U-100 INSULIN) 100 unit/mL (3 mL) InPn pen Inject 5 Units into the skin 3 (three) times daily with meals. If not eating, ok to hold  Qty: 1 Box, Refills: 3    Associated Diagnoses: Uncontrolled type 2 diabetes mellitus with hyperglycemia; Type 2 diabetes mellitus with hyperglycemia, with long-term current use of insulin      insulin detemir U-100 (LEVEMIR FLEXTOUCH) 100 unit/mL (3 mL) SubQ InPn pen Inject 8 Units into the skin once daily.  Qty: 3 mL, Refills: 3      sodium bicarbonate 650 MG tablet Take 1 tablet (650 mg total) by mouth 2 (two) times daily.  Qty: 60 tablet, Refills: 11      alogliptin (NESINA) 25 mg Tab Take 1 tablet (25 mg) by mouth once daily.  Qty: 30 tablet, Refills: 11      apixaban (ELIQUIS) 5 mg Tab Take 1 tablet (5 mg total) by mouth 2 (two) times daily.  Qty: 180 tablet, Refills: 3      blood-glucose meter,continuous (DEXCOM G6 ) Misc Use to check blood sugars 4 times/day  Qty: 1 each, Refills: 0    Associated Diagnoses: Type 2 diabetes mellitus with diabetic nephropathy, with long-term current use of insulin      blood-glucose sensor (DEXCOM G6 SENSOR) Radha Apply one sensor to skin every 10 days  Qty: 3 each, Refills: 11    Associated Diagnoses: Type 2 diabetes mellitus with diabetic nephropathy, with long-term current use of insulin      blood-glucose transmitter (DEXCOM G6 TRANSMITTER) Radha Replace transmitter every 3 months to use with dexcom sensor  Qty: 1 each, Refills: 3    Associated Diagnoses: Type 2 diabetes mellitus with  diabetic nephropathy, with long-term current use of insulin      collagenase (SANTYL) ointment Apply topically once daily. Nursing to cleanse L groin and 2 areas to left AKA incision site with sterile normal saline,  then apply a thin layer of Santyl ointment to affected area daily. Cover with bordered foam island mepore dressing.  Refills: 0      magnesium oxide (MAG-OX) 400 mg (241.3 mg magnesium) tablet Take 1 tablet (400 mg total) by mouth 2 (two) times daily.  Refills: 0      melatonin (MELATIN) 3 mg tablet Take 2 tablets (6 mg total) by mouth nightly as needed for Insomnia.  Refills: 0      nystatin (MYCOSTATIN) powder Apply topically 2 (two) times daily.  Qty: 60 g, Refills: 11         STOP taking these medications       EScitalopram oxalate (LEXAPRO) 10 MG tablet Comments:   Reason for Stopping:                  Immunizations Administered as of 1/10/2022  Reviewed on 1/7/2022    Name Date Dose VIS Date Route Exp Date    COVID-19, MRNA, LN-S, PF (Pfizer)  Incomplete 0.3 mL 12/12/2020 Intramuscular --    : [employer] Inc     COVID-19, MRNA, LN-S, PF (Pfizer) 3/30/2021 11:24 AM 0.3 mL 12/12/2020 Intramuscular 7/31/2021    Site: Right deltoid     Given By: Margoth Alfredo     : Pf[employer] Inc     Lot: [id number]     COVID-19, MRNA, LN-S, PF (Pfizer) 3/9/2021  2:50 PM 0.3 mL 12/12/2020 Intramuscular 6/30/2021    Site: Right deltoid     Given By: Amira Berrios RN     : Pf[employer] Inc     Lot: [id number]           This patient has had both covid vaccinations    Some patients may experience side effects after vaccination.  These may include fever, headache, muscle or joint aches.  Most symptoms resolve with 24-48 hours and do not require urgent medical evaluation unless they persist for more than 72 hours or symptoms are concerning for an unrelated medical condition.          _________________________________  Ruben Flood MD  01/10/2022

## 2022-01-10 NOTE — NURSING
RN called into room by patients  while patient being changed and rico care being performed by tech.  yelling and stating patients wound to sacrum is not getting better.  shows picture to RN of wound at its first stages and RN noted wound to be healing.  continued to yell stating wound is not getting any better. RN informed  that wound care is being performed, mepilex has been being applied and patient turned q2 hrs. During rico care,  is noted to be taking pictures of her patients sacrum. Patient cleaned, barrier cream applied, mepilex applied and patient turned. Will continue to monitor.

## 2022-01-10 NOTE — NURSING
Report given to nurse Justin at [facility]. Patient awaiting wheelchair van for pickup. Patient to be transferred with R midline to upper arm and belongings.

## 2022-01-10 NOTE — SUBJECTIVE & OBJECTIVE
Interval History: Patient completed PO abx, doing well. Lyrica increased and patient reports improvement. Awaiting rehab placement.    Review of Systems   Constitutional: Negative for chills and fever.   Respiratory: Negative for cough and shortness of breath.    Cardiovascular: Negative for chest pain and leg swelling.   Gastrointestinal: Negative for abdominal pain and nausea.   Genitourinary: Negative for difficulty urinating.   Musculoskeletal: Positive for arthralgias and myalgias.        Right leg pain   Skin: Positive for wound.   Neurological: Positive for weakness.   Psychiatric/Behavioral: Positive for dysphoric mood.        Depressed     Objective:     Vital Signs (Most Recent):  Temp: 98.5 °F (36.9 °C) (01/09/22 2058)  Pulse: 97 (01/09/22 2058)  Resp: 18 (01/09/22 2237)  BP: 129/78 (01/09/22 2058)  SpO2: 100 % (01/09/22 2058) Vital Signs (24h Range):  Temp:  [97.6 °F (36.4 °C)-98.6 °F (37 °C)] 98.5 °F (36.9 °C)  Pulse:  [] 97  Resp:  [18-20] 18  SpO2:  [96 %-100 %] 100 %  BP: (129-167)/(64-85) 129/78     Weight: 77.9 kg (171 lb 11.8 oz)  Body mass index is 28.58 kg/m².    Intake/Output Summary (Last 24 hours) at 1/9/2022 2251  Last data filed at 1/9/2022 0045  Gross per 24 hour   Intake --   Output 300 ml   Net -300 ml      Physical Exam  Vitals and nursing note reviewed.   Constitutional:       General: She is not in acute distress.     Appearance: She is obese.   Cardiovascular:      Rate and Rhythm: Normal rate.   Pulmonary:      Effort: Pulmonary effort is normal. No respiratory distress.   Musculoskeletal:      Cervical back: Normal range of motion.      Comments: Bilateral leg amputations   Skin:     General: Skin is dry.      Comments: Right leg BKA   Neurological:      Mental Status: She is alert and oriented to person, place, and time.      Motor: Weakness present.   Psychiatric:         Behavior: Behavior normal.         Thought Content: Thought content normal.      Comments: Depressed  about losing leg         Significant Labs:   All pertinent labs within the past 24 hours have been reviewed.  BMP:   No results for input(s): GLU, NA, K, CL, CO2, BUN, CREATININE, CALCIUM, MG in the last 48 hours.    Significant Imaging: I have reviewed all pertinent imaging results/findings within the past 24 hours.

## 2022-01-10 NOTE — NURSING
Ambulance picked up patient for patient transfer to [facility]. Patient transported with belongings in stable condition. NAD.

## 2022-01-10 NOTE — PROGRESS NOTES
[facility] Medicine  Telemedicine Progress Note    Patient Name: Suyapa Connelly  MRN: [id number]  Patient Class: IP- Inpatient   Admission Date: 12/21/2021  Length of Stay: 19 days  Attending Physician: Ruben Flood MD  Primary Care Provider: Magen Christensen MD          Subjective:     Principal Problem:S/P BKA (below knee amputation) unilateral, right        HPI:  Suyapa Connelly is a 55 yo female with a pmh of DM2, PVD, osteomyelitis, right AKA, CKD3, CAD s/p CABG, afib on eliquis. She presented with right foot pain x 1 month. She has an ulcer to her right heel. Presented with dressing to right heel which was removed and had purulent foul smelling drainage noted. The dressing was not changed in 2 months since she last saw Dr. Askew on 10/22/21 for arthrectomy, thrombectomy, and PTA with DCB to right lower extremity. Had not gone to follow up appts. She now has what appears to be dry gangrene to right toes with bone exposure. She has not been compliant with medications and states she had not taken eliquis in over a month. She states she has not been able to stand due to right leg weakness since her left AKA in May 2021. She also has a pressure wound to the lateral right lower leg. She takes her 's percocet for pain, which was helping some.       Overview/Hospital Course:  Patient admitted with osteomyelitis of RLE and gangrene, ID and podiatry consulted and patient was started on IV vancomycin and zosyn. Podiatry recommended general surgery consult for BKA, which was done. Patient continued to have leukocytosis so remains on IV antibiotics. PT/OT recommending rehab. ID re-consulted on 1/2 due to persistent leukocytosis and repeat blood cultures, CXR, and U/A ordered. Continuing IV zosyn because patient is allergic to ciprofloxacin - appreciate ID recs.De-escalated to PO augmentin on 1/3/22      Interval History: Patient completed PO abx, doing well. Lyrica increased and  patient reports improvement. Awaiting rehab placement.    Review of Systems   Constitutional: Negative for chills and fever.   Respiratory: Negative for cough and shortness of breath.    Cardiovascular: Negative for chest pain and leg swelling.   Gastrointestinal: Negative for abdominal pain and nausea.   Genitourinary: Negative for difficulty urinating.   Musculoskeletal: Positive for arthralgias and myalgias.        Right leg pain   Skin: Positive for wound.   Neurological: Positive for weakness.   Psychiatric/Behavioral: Positive for dysphoric mood.        Depressed     Objective:     Vital Signs (Most Recent):  Temp: 98.5 °F (36.9 °C) (01/09/22 2058)  Pulse: 97 (01/09/22 2058)  Resp: 18 (01/09/22 2237)  BP: 129/78 (01/09/22 2058)  SpO2: 100 % (01/09/22 2058) Vital Signs (24h Range):  Temp:  [97.6 °F (36.4 °C)-98.6 °F (37 °C)] 98.5 °F (36.9 °C)  Pulse:  [] 97  Resp:  [18-20] 18  SpO2:  [96 %-100 %] 100 %  BP: (129-167)/(64-85) 129/78     Weight: 77.9 kg (171 lb 11.8 oz)  Body mass index is 28.58 kg/m².    Intake/Output Summary (Last 24 hours) at 1/9/2022 2251  Last data filed at 1/9/2022 0045  Gross per 24 hour   Intake --   Output 300 ml   Net -300 ml      Physical Exam  Vitals and nursing note reviewed.   Constitutional:       General: She is not in acute distress.     Appearance: She is obese.   Cardiovascular:      Rate and Rhythm: Normal rate.   Pulmonary:      Effort: Pulmonary effort is normal. No respiratory distress.   Musculoskeletal:      Cervical back: Normal range of motion.      Comments: Bilateral leg amputations   Skin:     General: Skin is dry.      Comments: Right leg BKA   Neurological:      Mental Status: She is alert and oriented to person, place, and time.      Motor: Weakness present.   Psychiatric:         Behavior: Behavior normal.         Thought Content: Thought content normal.      Comments: Depressed about losing leg         Significant Labs:   All pertinent labs within the past  24 hours have been reviewed.  BMP:   No results for input(s): GLU, NA, K, CL, CO2, BUN, CREATININE, CALCIUM, MG in the last 48 hours.    Significant Imaging: I have reviewed all pertinent imaging results/findings within the past 24 hours.      Assessment/Plan:      * S/P BKA (below knee amputation) unilateral, right  Gangrene  Osteomyelitis  Diabetic Foot ulcer  PVD    - Dry gangrene right foot, osteo on MRI on presentation  - started on vanc, flagyl, and cefepime  - eliquis held  - ID, cardiology, and general surgery consulted  - BKA on 12/28  - antibiotics stopped post BKA per ID recs  - pain meds adjusted  - nutritional supplements added for wound healing  - PT/OT consulted  - discussed with general surgery, reports wound looks good post operatively  - restart vanc and zosyn   - lactic and blood cultures ordered--NGTD  - WBC uptrending, ID re-consulted 1/2/22  - IPR if remains stable       Osteomyelitis of right foot  See s/p BKA      Diabetic ulcer of right foot associated with type 2 diabetes mellitus  See s/p BKA      Cellulitis  See S/p BKA        Stage 3 chronic kidney disease due to type 2 diabetes mellitus  - slightly worsening, U/A and urine lytes ordered  - Renally dose meds      Chronic diastolic heart failure  - hold lasix due to hypotension  - denies SOB on room air  - no hypoxia noted      Chronic anemia  - Hgb drop from 13 to 7 over 2 month period  - Type and screen  - Hold plavix and eliquis (had not been taking)  - Denies acute blood loss  - s/p 1 U PRBC 12/23 and again 1/1      Hypokalemia  replete      Critical lower limb ischemia  - Had intervention to RLE in October  - patient non-compliant with medications, wound care, and follow up appts  - Persistent non-healing diabetic right foot wounds   - Arterial US showing stenosis   - Cardiology following  - BKA on 12/28/21              Paroxysmal atrial fibrillation  - NSR on tele  - Noncompliant with eliquis  - Cont to hold for now s/p RLE  amptutation      Type 2 diabetes mellitus with hyperglycemia, with long-term current use of insulin  - Hold home meds  - Detemir 5u nightly  - Accuchecks and ISS  - A1C 9.6        MDD (major depressive disorder), recurrent episode, moderate  Expresses depression and anger about losing both of her legs this year despite being compliant and doing everything asked of her. (She presented with gangrenous foot after not changing her wound dressing, not attending any follow up appts, and not taking medications for months)  Consult psych for eval, Appreciate recs  increase remeron to 15mg at night  Start zoloft 50mg daily  D/c lexapro      Essential hypertension  - stop amlodipine 5mg daily for now due to soft blood pressures, resume as able  - Monitor pressures  - given IVF on 1/1 and 1/2 due to soft BP      Gangrene  See S/p right BKA          VTE Risk Mitigation (From admission, onward)         Ordered     Reason for No Pharmacological VTE Prophylaxis  Once        Question:  Reasons:  Answer:  Already adequately anticoagulated on oral Anticoagulants    12/21/21 1910     IP VTE HIGH RISK PATIENT  Once         12/21/21 1910                      I have assessed these finding virtually using telemed platform and with assistance of bedside nurse                 The attending portion of this evaluation, treatment, and documentation was performed per Ruben Flood MD via Telemedicine AudioVisual using the secure [employer] software platform with 2 way audio/video. The provider was located off-site and the patient is located in the hospital. The aforementioned video software was utilized to document the relevant history and physical exam    Ruben Flood MD  Department of Hospital Medicine   [place] [facility]

## 2022-01-10 NOTE — PLAN OF CARE
Progress notes reviewed. Evening rounds completed. Introduced self as RN for this shift. Educated pt on RN's role in patient's care.  Plan of care reviewed with patient and . Opportunity given for pt's questions. No questions or concerns expressed at this time. Sched meds given . Dsg to right BKA changed as per md order . Meplex to buttocks remains CDI.RN to continue to monitor.

## 2022-01-10 NOTE — PLAN OF CARE
[employer] has been approved for [facility].  I discussed discharge plans with pt via [employer].  Also spoke with Randell (pt's spouse) [phone] per pt's request to update him on pt's status.  Wheelchair transportation set up for 11:30.  Rounds completed on pt.  All questions addressed.  Bedside nurse to discuss d/c medications.  Discussed importance to attend all f/u appts and take medications as prescribed.  Verbalized understanding.    Future Appointments   Date Time Provider Department Center   1/20/2022  1:45 PM Kaitlyn Rojas MD [facility] Kaitlyn   4/4/2022  2:30 PM Fawad Jameson MD [facility]        01/10/22 0983   Final Note   Assessment Type Final Discharge Note   Anticipated Discharge Disposition Rehab   Hospital Resources/Appts/Education Provided Appointments scheduled by Navigator/Coordinator   Post-Acute Status   Post-Acute Placement Status Set-up Complete/Auth obtained   Discharge Delays None known at this time     Marty Bruno RN,   [phone]

## 2022-01-10 NOTE — PLAN OF CARE
Vicki with [facility] is checking status of [employer] auth now.  CM will follow-up.    Future Appointments   Date Time Provider Department Center   1/20/2022  1:45 PM Kaitlyn Rojas MD [facility] Kaitlyn   4/4/2022  2:30 PM Fawad Jameson MD [facility] GIDEON Morales [facility]        01/10/22 0852   Post-Acute Status   Post-Acute Authorization Placement   Discharge Plan   Discharge Plan A Rehab     Marty Bruno RN,   [phone]

## 2022-01-10 NOTE — PLAN OF CARE
Problem: Adult Inpatient Plan of Care  Goal: Plan of Care Review  Outcome: Ongoing, Progressing     POC reviewed with patient. No change in condition. Patient centered care maintained. Hourly rounding performed. Pain assessment performed. Pain medications administered PRN. Telemetry monitor on. Accuchecks AC/HS. Q2 turns. Dressing to RLE clean dry and intact. Safety precautions initiated and maintained. Bed locked and in lowest position. Side rails up X 2. Bed alarm on. Call light and bedside table within reach. Will continue to monitor.

## 2022-01-11 ENCOUNTER — PATIENT OUTREACH (OUTPATIENT)
Dept: ADMINISTRATIVE | Facility: OTHER | Age: 57
End: 2022-01-11
Payer: MEDICARE

## 2022-01-11 ENCOUNTER — HOSPITAL ENCOUNTER (EMERGENCY)
Facility: HOSPITAL | Age: 57
Discharge: HOME | End: 2022-01-11
Attending: EMERGENCY MEDICINE
Payer: MEDICARE

## 2022-01-11 VITALS
HEIGHT: 65 IN | RESPIRATION RATE: 19 BRPM | DIASTOLIC BLOOD PRESSURE: 87 MMHG | SYSTOLIC BLOOD PRESSURE: 142 MMHG | TEMPERATURE: 98 F | BODY MASS INDEX: 29.16 KG/M2 | OXYGEN SATURATION: 99 % | HEART RATE: 95 BPM | WEIGHT: 175 LBS

## 2022-01-11 DIAGNOSIS — T14.8XXA WOUND INFECTION: Primary | ICD-10-CM

## 2022-01-11 DIAGNOSIS — L08.9 WOUND INFECTION: Primary | ICD-10-CM

## 2022-01-11 LAB
ALBUMIN SERPL BCP-MCNC: 1.6 G/DL (ref 3.5–5.2)
ALP SERPL-CCNC: 109 U/L (ref 55–135)
ALT SERPL W/O P-5'-P-CCNC: 12 U/L (ref 10–44)
ANION GAP SERPL CALC-SCNC: 8 MMOL/L (ref 8–16)
AST SERPL-CCNC: 24 U/L (ref 10–40)
BASOPHILS # BLD AUTO: 0.12 K/UL (ref 0–0.2)
BASOPHILS NFR BLD: 0.8 % (ref 0–1.9)
BILIRUB SERPL-MCNC: 0.2 MG/DL (ref 0.1–1)
BUN SERPL-MCNC: 34 MG/DL (ref 6–20)
CALCIUM SERPL-MCNC: 8.3 MG/DL (ref 8.7–10.5)
CHLORIDE SERPL-SCNC: 107 MMOL/L (ref 95–110)
CO2 SERPL-SCNC: 24 MMOL/L (ref 23–29)
CREAT SERPL-MCNC: 0.9 MG/DL (ref 0.5–1.4)
DIFFERENTIAL METHOD: ABNORMAL
EOSINOPHIL # BLD AUTO: 0.2 K/UL (ref 0–0.5)
EOSINOPHIL NFR BLD: 1.5 % (ref 0–8)
ERYTHROCYTE [DISTWIDTH] IN BLOOD BY AUTOMATED COUNT: 14.2 % (ref 11.5–14.5)
EST. GFR  (AFRICAN AMERICAN): >60 ML/MIN/1.73 M^2
EST. GFR  (NON AFRICAN AMERICAN): >60 ML/MIN/1.73 M^2
GLUCOSE SERPL-MCNC: 98 MG/DL (ref 70–110)
HCT VFR BLD AUTO: 24.7 % (ref 37–48.5)
HGB BLD-MCNC: 7.6 G/DL (ref 12–16)
IMM GRANULOCYTES # BLD AUTO: 0.12 K/UL (ref 0–0.04)
IMM GRANULOCYTES NFR BLD AUTO: 0.8 % (ref 0–0.5)
LACTATE SERPL-SCNC: 0.6 MMOL/L (ref 0.5–2.2)
LYMPHOCYTES # BLD AUTO: 1.6 K/UL (ref 1–4.8)
LYMPHOCYTES NFR BLD: 11 % (ref 18–48)
MCH RBC QN AUTO: 30.2 PG (ref 27–31)
MCHC RBC AUTO-ENTMCNC: 30.8 G/DL (ref 32–36)
MCV RBC AUTO: 98 FL (ref 82–98)
MONOCYTES # BLD AUTO: 1 K/UL (ref 0.3–1)
MONOCYTES NFR BLD: 7.1 % (ref 4–15)
NEUTROPHILS # BLD AUTO: 11.4 K/UL (ref 1.8–7.7)
NEUTROPHILS NFR BLD: 78.8 % (ref 38–73)
NRBC BLD-RTO: 0 /100 WBC
PLATELET # BLD AUTO: 557 K/UL (ref 150–450)
PMV BLD AUTO: 10.7 FL (ref 9.2–12.9)
POTASSIUM SERPL-SCNC: 4 MMOL/L (ref 3.5–5.1)
PROT SERPL-MCNC: 6.4 G/DL (ref 6–8.4)
RBC # BLD AUTO: 2.52 M/UL (ref 4–5.4)
SODIUM SERPL-SCNC: 139 MMOL/L (ref 136–145)
WBC # BLD AUTO: 14.42 K/UL (ref 3.9–12.7)

## 2022-01-11 PROCEDURE — 85025 COMPLETE CBC W/AUTO DIFF WBC: CPT | Performed by: NURSE PRACTITIONER

## 2022-01-11 PROCEDURE — 99284 EMERGENCY DEPT VISIT MOD MDM: CPT | Mod: 25,HCNC

## 2022-01-11 PROCEDURE — 25000003 PHARM REV CODE 250: Performed by: EMERGENCY MEDICINE

## 2022-01-11 PROCEDURE — 80053 COMPREHEN METABOLIC PANEL: CPT | Performed by: NURSE PRACTITIONER

## 2022-01-11 PROCEDURE — 83605 ASSAY OF LACTIC ACID: CPT | Performed by: NURSE PRACTITIONER

## 2022-01-11 RX ORDER — OXYCODONE AND ACETAMINOPHEN 10; 325 MG/1; MG/1
1 TABLET ORAL
Status: COMPLETED | OUTPATIENT
Start: 2022-01-11 | End: 2022-01-11

## 2022-01-11 RX ORDER — HYDROCODONE BITARTRATE AND ACETAMINOPHEN 5; 325 MG/1; MG/1
1 TABLET ORAL EVERY 4 HOURS PRN
Qty: 12 TABLET | Refills: 0 | Status: ON HOLD | OUTPATIENT
Start: 2022-01-11 | End: 2022-03-16 | Stop reason: HOSPADM

## 2022-01-11 RX ORDER — AMOXICILLIN AND CLAVULANATE POTASSIUM 875; 125 MG/1; MG/1
1 TABLET, FILM COATED ORAL 2 TIMES DAILY
Qty: 14 TABLET | Refills: 0 | Status: ON HOLD | OUTPATIENT
Start: 2022-01-11 | End: 2022-01-29 | Stop reason: HOSPADM

## 2022-01-11 RX ADMIN — OXYCODONE AND ACETAMINOPHEN 1 TABLET: 10; 325 TABLET ORAL at 07:01

## 2022-01-11 NOTE — PHARMACY MED REC
"  Admission Medication History     The home medication history was taken by Heidy Pollard CPhT.    Medication history obtained from, Patient Verified    You may go to "Admission" then "Reconcile Home Medications" tabs to review and/or act upon these items.      The home medication list has been updated by the Pharmacy department.    Please read ALL comments highlighted in yellow.    Please address this information as you see fit.     Feel free to contact us if you have any questions or require assistance.      The medications listed below were removed from the home medication list.  Please reorder if appropriate:  Patient reports no longer taking the following medication(s):   Santyl ointment   Nystatin powder        Heidy Pollard CPhT.  Ext [phone]              .          "

## 2022-01-11 NOTE — ED PROVIDER NOTES
"Encounter Date: 1/11/2022       History     Chief Complaint   Patient presents with    Wound Check     Sent to ED from [facility] for wound check. Pt had Right BKA by Bob. Rehab reported surgical site dehiscence and necrotic tissue to site       56-year-old female presents emergency room with reports of a dehisced wound to the right BKA.  Patient was sent from the rehab center for further evaluation.  The BKA was completed per Dr. Rojas here at [facility].  Patient denies any fever.  She does report a serous colored drainage from the site.  Patient is visibly upset as she states "she does not have any legs and does not want to have any more surgeries".  I spoke with the nurse practitioner from the rehab facility who states that she noted that there was eschar to the wound in addition to the lateral aspect of the wound being dehisced.  The patient has a past medical history of anxiety, CKD, chronic pain syndrome, hyperlipidemia, hyperemesis, hypertension, hypokalemia, MI, osteomyelitis, GERD, PVD.     The history is provided by the patient and the EMS personnel. No  was used.     Review of patient's allergies indicates:   Allergen Reactions    Ciprofloxacin Itching    Contrast media      Kidney injury    Iodine      Kidney injury     Past Medical History:   Diagnosis Date    Anxiety     Chronic pain syndrome     CKD (chronic kidney disease), stage III     Depression     Diabetes mellitus     type 2    Diabetes mellitus, type 2     GERD (gastroesophageal reflux disease)     Hyperemesis 3/23/2021    Hyperlipemia     Hypertension     Hypokalemia 3/23/2021    Myocardial infarction 2010    minor-caused by stress per pt.    Osteomyelitis     Osteomyelitis of left foot 4/30/2021    PVD (peripheral vascular disease)     Vaginal delivery     x1     Past Surgical History:   Procedure Laterality Date    ABOVE-KNEE AMPUTATION Left 5/18/2021    Procedure: AMPUTATION, ABOVE KNEE;  " Surgeon: Teddy Huber MD;  [facility];  Service: Vascular;  Laterality: Left;    Angiogram - Right Extremity Right 7/9/15    angiogram-left leg  10/6/15    ANGIOGRAPHY OF LOWER EXTREMITY Left 4/29/2021    Procedure: ANGIOGRAM, LOWER EXTREMITY;  Surgeon: Teddy Huber MD;  [facility];  Service: Vascular;  Laterality: Left;    BELOW KNEE AMPUTATION OF LOWER EXTREMITY Right 12/28/2021    Procedure: AMPUTATION, BELOW KNEE;  Surgeon: Kaitlyn Rojas MD;  Location: [facility] OR;  Service: General;  Laterality: Right;    CATHETERIZATION OF BOTH LEFT AND RIGHT HEART N/A 12/18/2019    Procedure: CATHETERIZATION, HEART, BOTH LEFT AND RIGHT;  Surgeon: Que Fernando III, MD;  Location: [facility] CATH LAB;  Service: Cardiology;  Laterality: N/A;    CORONARY ANGIOGRAPHY N/A 12/18/2019    Procedure: ANGIOGRAM, CORONARY ARTERY;  Surgeon: Que Fernando III, MD;  Location: [facility] CATH LAB;  Service: Cardiology;  Laterality: N/A;    CORONARY ANGIOGRAPHY INCLUDING BYPASS GRAFTS WITH CATHETERIZATION OF LEFT HEART N/A 7/28/2020    Procedure: ANGIOGRAM, CORONARY, INCLUDING BYPASS GRAFT, WITH LEFT HEART CATHETERIZATION, 9 am;  Surgeon: Rachel Easley MD;  Location: [facility] C[facility] LAB;  Service: Cardiology;  Laterality: N/A;    CORONARY ARTERY BYPASS GRAFT (CABG) N/A 1/14/2020    Procedure: CORONARY ARTERY BYPASS GRAFT (CABG) x 1     Off Pump;  Surgeon: Huang Altamirano MD;  Location: [address];  Service: Cardiovascular;  Laterality: N/A;    CREATION OF FEMORAL-TIBIAL ARTERY BYPASS Left 4/29/2021    Procedure: CREATION, BYPASS, ARTERIAL, FEMORAL TO ANTERIOR TIBIAL;  Surgeon: Teddy Huber MD;  Location: [facility] OR [address] [place] [address];  Service: Vascular;  Laterality: Left;    CREATION OF FEMOROPOPLITEAL ARTERIAL BYPASS USING GRAFT Left 8/18/2020    Procedure: CREATION, BYPASS, ARTERIAL, FEMORAL TO POPLITEAL, USING GRAFT, LEFT LOWER EXTREMITY;  Surgeon: Teddy Huber MD;   Location: [facility] OR;  Service: Vascular;  Laterality: Left;  REQUEST 7:15 A.M. START----COVID NEGATIVE ON 8/17  1ST CASE STARTE PER LEANA ON 8/7/2020 @ 942AM-  RN PREOP 8/12/2020   T/S-----CLEARED BY CARDS-------PENDING INSURANCE    DEBRIDEMENT OF FOOT Left 9/8/2020    Procedure: DEBRIDEMENT, FOOT;  Surgeon: Rosoi Mayes DPM;  Location: [facility] OR;  Service: Podiatry;  Laterality: Left;  request neoxx .   RN Pre Op 9-4-2020, Covid negative on 9/5/20. C A    DEBRIDEMENT OF FOOT  3/4/2021    Procedure: DEBRIDEMENT, FOOT;  Surgeon: Teddy Huber MD;  Location: [facility] OR;  Service: Vascular;;    DEBRIDEMENT OF FOOT Left 3/9/2021    Procedure: DEBRIDEMENT, FOOT, bone biopsy;  Surgeon: Rosio Mayes DPM;  Location: [facility] OR;  Service: Podiatry;  Laterality: Left;  Request neoxx---COVID IN AM  REQUESTING NOCARTER GALLOWAY  RN Phone Pre op.On Blood thinners Plavix and Eliquis.  Covid am of surgery. C A    DEBRIDEMENT OF FOOT Left 5/4/2021    Procedure: DEBRIDEMENT, FOOT;  Surgeon: Farooq Morley DPM;  [facility];  Service: Podiatry;  Laterality: Left;    INSERTION OF TUNNELED CENTRAL VENOUS HEMODIALYSIS CATHETER N/A 1/27/2020    Procedure: Insertion, Catheter, Central Venous, Hemodialysis;  Surgeon: ESTEBAN Gomez III, MD;  Location: [facility] CATH LAB;  Service: Peripheral Vascular;  Laterality: N/A;    PERCUTANEOUS TRANSLUMINAL ANGIOPLASTY N/A 3/4/2021    Procedure: PTA (ANGIOPLASTY, PERCUTANEOUS, TRANSLUMINAL);  Surgeon: Teddy Huber MD;  Location: [facility] OR;  Service: Vascular;  Laterality: N/A;    REMOVAL OF ARTERIOVENOUS GRAFT Left 5/27/2021    Procedure: REMOVAL, GRAFT, LEFT LOWER EXTREMITY, WOUND EXPLORATION;  Surgeon: Teddy Huber MD;  Location: [facility] OR 2ND FLR;  Service: Vascular;  Laterality: Left;    REMOVAL OF NAIL OF DIGIT Left 3/9/2021    Procedure: REMOVAL, NAIL, DIGIT;  Surgeon: Rosio Mayes DPM;  Location: [facility];  Service: Podiatry;  Laterality: Left;     THROMBECTOMY Left 3/4/2021    Procedure: THROMBECTOMY, LEFT LOWER EXTREMITY BYPASS GRAFT, ANGIOGRAM, POSSIBLE INTERVENTION, POSSIBLE LEFT LOWER EXTREMITY BYPASS;  Surgeon: Teddy Huber MD;  Location: [facility] OR;  Service: Vascular;  Laterality: Left;    THROMBECTOMY Left 4/29/2021    Procedure: GRAFT THROMBECTOMY, LEFT LOWER EXTREMITY;  Surgeon: Teddy Huber MD;  Location: [facility] OR 2ND FLR;  Service: Vascular;  Laterality: Left;  14.5 min  1179.85 mGy  341.01 Gycm2  240 ml dye    THROMBECTOMY  10/22/2021    Procedure: THROMBECTOMY;  Surgeon: Saad Arenas MD;  [facility]: [facility]ATH LAB[facility]EP;  Service: Cardiology;;     Family History   Problem Relation Age of Onset    Diabetes Mother     Diabetes Father     Heart disease Maternal Grandmother     No Known Problems Maternal Grandfather     Diabetes Paternal Grandmother     No Known Problems Paternal Grandfather     Anesthesia problems Neg Hx      Social History     Tobacco Use    Smoking status: Former Smoker    Smokeless tobacco: Never Used   Substance Use Topics    Alcohol use: No    Drug use: Yes     Types: Marijuana     Comment: occassional     Review of Systems   Constitutional: Negative for fever.   Gastrointestinal: Negative for diarrhea, nausea and vomiting.   Musculoskeletal: Negative for neck pain and neck stiffness.   Skin: Positive for wound.       Physical Exam     Initial Vitals [01/11/22 1417]   BP Pulse Resp Temp SpO2   (!) 142/87 95 16 98.2 °F (36.8 °C) 99 %      MAP       --         Physical Exam    Constitutional: She appears well-developed and well-nourished.   HENT:   Head: Normocephalic.   Right Ear: Hearing and tympanic membrane normal.   Left Ear: Hearing and tympanic membrane normal.   Nose: Nose normal.   Mouth/Throat: Oropharynx is clear and moist.   Eyes: Lids are normal. Pupils are equal, round, and reactive to light.   Neck:   Normal range of motion.  Cardiovascular: Normal rate.   Pulmonary/Chest: Breath sounds  normal. No respiratory distress. She has no wheezes. She has no rhonchi.   Abdominal: Abdomen is soft. There is no abdominal tenderness.   Musculoskeletal:         General: Normal range of motion.      Cervical back: Normal range of motion. No rigidity.        Legs:       Comments:  There is a right BKA with staples still present.  The wound is dehisced to the lateral aspect of the wound.  There is a serous colored drainage noted.  There is a small amount of eschar also noted to this area.  There is no surrounding erythema.       Neurological: She is alert and oriented to person, place, and time.   Skin: Skin is warm and dry. No rash noted.   Psychiatric: She has a normal mood and affect. Her behavior is normal. Judgment and thought content normal.                 ED Course   Procedures  Labs Reviewed   CULTURE, BLOOD   CULTURE, BLOOD   CBC W/ AUTO DIFFERENTIAL   COMPREHENSIVE METABOLIC PANEL   LACTIC ACID, PLASMA          Imaging Results    None          Medications - No data to display  Medical Decision Making:   Clinical Tests:   Lab Tests: Ordered  Radiological Study: Ordered             ED Course as of 01/11/22 1802   Tue Jan 11, 2022   1523 Spoke with VENANCIO Sarabia from the rehab concerning the wound as the wound is dehisced and draining.  [DT]   1741 Contacted Dr Rojas [DT]   1801 Dr rojas states the wounds are not infected, to follow instruction given for cleaning. Will check cbc, if normal will be sent back to rehab with Augmentin.  [DT]      ED Course User Index  [DT] Mayra Ribeiro NP             Clinical Impression:   Final diagnoses:  [T14.8XXA, L08.9] Wound infection (Primary)                 Mayra Ribeiro NP  01/11/22 1801       Mayra Ribeiro NP  01/11/22 1802

## 2022-01-11 NOTE — PROGRESS NOTES
IP Liaison - Final Visit Note    Patient: Suyapa Connelly  MRN:  [id number]  Date of Service:  1/11/2022  Completed by:  JENNIFER Lazo    Reason for Visit   Patient presents with    IP Liaison Chart Review        Patient Summary     Discharge Date: 1/10/2022  Discharge telephone number/address: [phone] / [facility]  Follow up provider: Kaitlyn Rojas MD  Follow up appointments: 1/20/2022 @ 1:45pm  Home Health agency & telephone number: n/a  DME ordered &  name: n/a  Assigned OPCM RN/SW: n/a  Report sent to follow up team (PCP/OPCM) via in basket message: n/a  Community Resources provided including agency name & contact info: [facility], 2 agencies that help with entergy bill, [facility], [facility], [employer], SNAP helpline, 2 community centers near pt home, [employer], and [employer] phone number        JENNIFER Lazo

## 2022-01-12 ENCOUNTER — TELEPHONE (OUTPATIENT)
Dept: INTERNAL MEDICINE | Facility: CLINIC | Age: 57
End: 2022-01-12
Payer: MEDICARE

## 2022-01-12 NOTE — PLAN OF CARE
Avs for discharge completed and ready to be placed in packet. Pt to be transferred to  [facility]  . This vn will review avs with pt or family when requested .

## 2022-01-12 NOTE — TELEPHONE ENCOUNTER
----- Message from Janessa Brown sent at 1/12/2022  3:15 PM CST -----  Contact: Self [phone]  Patient would like to get medical advice.    Would you like a call back, or a response through your [facility] portal?:   call back    Pharmacy name and phone # (copy from chart):      [id number] - [place], [place] - [address] [place] [address] [facility] [address]  [facility] [place] [address]  Phone: [phone] Fax: [phone]    Comments:   Calling to speak with the nurse regarding if provider can refill all active 25 medications. Pt states she was told by the hosp provider, the provider was unable to refill medications until appt with surgeon.

## 2022-01-12 NOTE — ED NOTES
Patient is a 56-year-old female who presents to the emergency room with reports of a dehisced wound to the right BKA.

## 2022-01-14 ENCOUNTER — OFFICE VISIT (OUTPATIENT)
Dept: PRIMARY CARE CLINIC | Facility: CLINIC | Age: 57
End: 2022-01-14
Payer: MEDICARE

## 2022-01-14 ENCOUNTER — TELEPHONE (OUTPATIENT)
Dept: INTERNAL MEDICINE | Facility: CLINIC | Age: 57
End: 2022-01-14
Payer: MEDICARE

## 2022-01-14 DIAGNOSIS — Z89.511 S/P BKA (BELOW KNEE AMPUTATION) UNILATERAL, RIGHT: Primary | ICD-10-CM

## 2022-01-14 PROBLEM — E83.42 HYPOMAGNESEMIA: Status: RESOLVED | Noted: 2021-06-24 | Resolved: 2022-01-14

## 2022-01-14 PROBLEM — E87.6 HYPOKALEMIA: Status: RESOLVED | Noted: 2021-03-23 | Resolved: 2022-01-14

## 2022-01-14 PROCEDURE — 99215 PR OFFICE/OUTPT VISIT, EST, LEVL V, 40-54 MIN: ICD-10-PCS | Mod: 95,,, | Performed by: INTERNAL MEDICINE

## 2022-01-14 PROCEDURE — 99215 OFFICE O/P EST HI 40 MIN: CPT | Mod: 95,,, | Performed by: INTERNAL MEDICINE

## 2022-01-14 NOTE — PROGRESS NOTES
[facility]   New Visit Progress Note   Recent Hospital Discharge     PRESENTING HISTORY     Chief Complaint/Reason for Admission:  Follow up Hospital Discharge   PCP: Magen Christensen MD    History of Present Illness:  Ms. Suyapa Connelly is a 56 y.o. female who was recently admitted to the hospital.    [facility]  Telemedicine Discharge Summary      Patient Name: Suyapa Connelly  MRN: [id number]  Patient Class: IP- Inpatient     Admission Date: 12/21/2021  Discharge Date 1/10/22:  Attending Physician: Ruben Flood MD  Primary Care Provider: Magen Christensen MD   ___________________________________________________________________    Today:  [facility] for initial hospital follow up.  Referred to [facility] for coordination of care.  Recently hospitalized for R foot osteomyelitis (bone exposed)  / dry gangrene.  Hx severe critical lower limb ischemia, peripheral arterial disease, revascularization 10/2021 but lost to follow up.   Patient seen in consultation with General Surgery team- R BKA performed 12/28/21.   Persistent post op leukocytosis noted.  Seen in consultation with ID team.   Repeat Cx's NEG. Infectious work up unrevealing.  Concern for post op leukemoid reaction.   Initially treated with IV Zosyn post op -> de-escalated to Augmentin.   Patient discharged to [facility] 1/10/22.  [facility] 1/11/22 per request of rehab team who had concerns about surgical wound infection and dehiscence.   General Surgery team determined no need for change in care plan and plan was return to inpatient rehab directly from ED.   Patient instead discharged to home- reason unclear.   She has since had a break in care.  She does not have home health or outpatient wound care in place.  She has and indwelling line in arm (midline/PICC?).  General Surgery follow up planned scheduled for 1/20/22; family was able to obtain earlier appt on 1/18/22.        Patient seen today via telemedicine for urgent appointment.   Patient unable to connect to [facility] for video visit.  Audio call conducted with patient  on speaker phone.    Patients  expresses dissatisfaction with care.  He is not sure why patient didn't return to inpatient rehab after ED visit- he does not remember being offered this option.  He is performing wound care using supplies provided at ED visit.     Discussed options moving forward-  I have offered to set up home health urgently with lab draw and wound care, General surgery follow up in [facility], and in person follow up in my office on Monday.   I would like to assess the patient further in person before making a decision on line removal.  Counseled family that line may remain in place until determination is made on need for further ABX therapy.   Patient and family deferred above offered plan and would like to establish new General Surgery and Wound Care team.  Patients  would like to go to [facility] for evaluation and he plans to take her there in next 24 hours.       PAST HISTORY:     Past Medical History:   Diagnosis Date    Anxiety     Chronic pain syndrome     CKD (chronic kidney disease), stage III     Depression     Diabetes mellitus     type 2    Diabetes mellitus, type 2     GERD (gastroesophageal reflux disease)     Hyperemesis 3/23/2021    Hyperlipemia     Hypertension     Hypokalemia 3/23/2021    Myocardial infarction 2010    minor-caused by stress per pt.    Osteomyelitis     Osteomyelitis of left foot 4/30/2021    PVD (peripheral vascular disease)     Vaginal delivery     x1       Past Surgical History:   Procedure Laterality Date    ABOVE-KNEE AMPUTATION Left 5/18/2021    Procedure: AMPUTATION, ABOVE KNEE;  Surgeon: Teddy Huber MD;  Location: [facility] OR [address] [place] [address];  Service: Vascular;  Laterality: Left;    Angiogram - Right Extremity Right 7/9/15     angiogram-left leg  10/6/15    ANGIOGRAPHY OF LOWER EXTREMITY Left 4/29/2021    Procedure: ANGIOGRAM, LOWER EXTREMITY;  Surgeon: Teddy Huber MD;  Location: [facility] OR [address] [place] [address];  Service: Vascular;  Laterality: Left;    BELOW KNEE AMPUTATION OF LOWER EXTREMITY Right 12/28/2021    Procedure: AMPUTATION, BELOW KNEE;  Surgeon: Kaitlyn Rojas MD;  Location: [facility] OR;  Service: General;  Laterality: Right;    CATHETERIZATION OF BOTH LEFT AND RIGHT HEART N/A 12/18/2019    Procedure: CATHETERIZATION, HEART, BOTH LEFT AND RIGHT;  Surgeon: Que Fernando III, MD;  Location: [facility] CATH LAB;  Service: Cardiology;  Laterality: N/A;    CORONARY ANGIOGRAPHY N/A 12/18/2019    Procedure: ANGIOGRAM, CORONARY ARTERY;  Surgeon: Que Fernando III, MD;  Location: [facility] CATH LAB;  Service: Cardiology;  Laterality: N/A;    CORONARY ANGIOGRAPHY INCLUDING BYPASS GRAFTS WITH CATHETERIZATION OF LEFT HEART N/A 7/28/2020    Procedure: ANGIOGRAM, CORONARY, INCLUDING BYPASS GRAFT, WITH LEFT HEART CATHETERIZATION, 9 am;  Surgeon: Rachel Easley MD;  Location: [facility] CATH LAB;  Service: Cardiology;  Laterality: N/A;    CORONARY ARTERY BYPASS GRAFT (CABG) N/A 1/14/2020    Procedure: CORONARY ARTERY BYPASS GRAFT (CABG) x 1     Off Pump;  Surgeon: Huang Altamirano MD;  Location: [address];  Service: Cardiovascular;  Laterality: N/A;    CREATION OF FEMORAL-TIBIAL ARTERY BYPASS Left 4/29/2021    Procedure: CREATION, BYPASS, ARTERIAL, FEMORAL TO ANTERIOR TIBIAL;  Surgeon: Teddy Huber MD;  Location: [address];  Service: Vascular;  Laterality: Left;    CREATION OF FEMOROPOPLITEAL ARTERIAL BYPASS USING GRAFT Left 8/18/2020    Procedure: CREATION, BYPASS, ARTERIAL, FEMORAL TO POPLITEAL, USING GRAFT, LEFT LOWER EXTREMITY;  Surgeon: Teddy Huber MD;  Location: [facility];  Service: Vascular;  Laterality: Left;  REQUEST 7:15 A.M. START----COVID NEGATIVE ON 8/17  1ST CASE STARTE PER LEANA ON 8/7/2020 @  942AM-LO  RN PREOP 8/12/2020   T/S-----CLEARED BY CARDS-------PENDING INSURANCE    DEBRIDEMENT OF FOOT Left 9/8/2020    Procedure: DEBRIDEMENT, FOOT;  Surgeon: Rosio Mayes DPM;  Location: [facility] OR;  Service: Podiatry;  Laterality: Left;  request neoxx .   RN Pre Op 9-4-2020, Covid negative on 9/5/20. C A    DEBRIDEMENT OF FOOT  3/4/2021    Procedure: DEBRIDEMENT, FOOT;  Surgeon: Teddy Huber MD;  Location: [facility] OR;  Service: Vascular;;    DEBRIDEMENT OF FOOT Left 3/9/2021    Procedure: DEBRIDEMENT, FOOT, bone biopsy;  Surgeon: Rosio Mayes DPM;  Location: [facility] OR;  Service: Podiatry;  Laterality: Left;  Request neoxx---COVID IN AM  REQUESTING NOON START  RN Phone Pre op.On Blood thinners Plavix and Eliquis.  Covid am of surgery. C A    DEBRIDEMENT OF FOOT Left 5/4/2021    Procedure: DEBRIDEMENT, FOOT;  Surgeon: Farooq Morley DPM;  [facility];  Service: Podiatry;  Laterality: Left;    INSERTION OF TUNNELED CENTRAL VENOUS HEMODIALYSIS CATHETER N/A 1/27/2020    Procedure: Insertion, Catheter, Central Venous, Hemodialysis;  Surgeon: ESTEBAN Gomez III, MD;  Location: [facility] CATH LAB;  Service: Peripheral Vascular;  Laterality: N/A;    PERCUTANEOUS TRANSLUMINAL ANGIOPLASTY N/A 3/4/2021    Procedure: PTA (ANGIOPLASTY, PERCUTANEOUS, TRANSLUMINAL);  Surgeon: Teddy Huber MD;  Location: [facility] OR;  Service: Vascular;  Laterality: N/A;    REMOVAL OF ARTERIOVENOUS GRAFT Left 5/27/2021    Procedure: REMOVAL, GRAFT, LEFT LOWER EXTREMITY, WOUND EXPLORATION;  Surgeon: Teddy Huber MD;  Location: [facility] OR 2ND FLR;  Service: Vascular;  Laterality: Left;    REMOVAL OF NAIL OF DIGIT Left 3/9/2021    Procedure: REMOVAL, NAIL, DIGIT;  Surgeon: Rosio Mayes DPM;  Location: [facility] OR;  Service: Podiatry;  Laterality: Left;    THROMBECTOMY Left 3/4/2021    Procedure: THROMBECTOMY, LEFT LOWER EXTREMITY BYPASS GRAFT, ANGIOGRAM, POSSIBLE INTERVENTION, POSSIBLE LEFT LOWER EXTREMITY  BYPASS;  Surgeon: Teddy Huber MD;  Location: [facility] OR;  Service: Vascular;  Laterality: Left;    THROMBECTOMY Left 4/29/2021    Procedure: GRAFT THROMBECTOMY, LEFT LOWER EXTREMITY;  Surgeon: Teddy Huber MD;  Location: [facility] OR 2ND FLR;  Service: Vascular;  Laterality: Left;  14.5 min  1179.85 mGy  341.01 Gycm2  240 ml dye    THROMBECTOMY  10/22/2021    Procedure: THROMBECTOMY;  Surgeon: Saad Arenas MD;  Location: [facility] CATH LAB/EP;  Service: Cardiology;;       Family History   Problem Relation Age of Onset    Diabetes Mother     Diabetes Father     Heart disease Maternal Grandmother     No Known Problems Maternal Grandfather     Diabetes Paternal Grandmother     No Known Problems Paternal Grandfather     Anesthesia problems Neg Hx          MEDICATIONS & ALLERGIES:     Current Outpatient Medications on File Prior to Visit   Medication Sig Dispense Refill    acetaminophen (TYLENOL) 500 MG tablet Take 2 tablets (1,000 mg total) by mouth every 8 (eight) hours as needed for Pain.  0    allopurinoL (ZYLOPRIM) 100 MG tablet Take 1 tablet (100 mg total) by mouth once daily. 30 tablet 5    amoxicillin-clavulanate 875-125mg (AUGMENTIN) 875-125 mg per tablet Take 1 tablet by mouth 2 (two) times daily. 14 tablet 0    apixaban (ELIQUIS) 5 mg Tab Take 1 tablet (5 mg total) by mouth 2 (two) times daily. 180 tablet 3    atorvastatin (LIPITOR) 80 MG tablet Take 1 tablet (80 mg total) by mouth every evening. 90 tablet 3    blood-glucose meter,continuous (DEXCOM G6 ) Misc Use to check blood sugars 4 times/day 1 each 0    blood-glucose sensor (DEXCOM G6 SENSOR) Radha Apply one sensor to skin every 10 days 3 each 11    blood-glucose transmitter (DEXCOM G6 TRANSMITTER) Radha Replace transmitter every 3 months to use with dexcom sensor 1 each 3    clopidogreL (PLAVIX) 75 mg tablet Take 1 tablet (75 mg total) by mouth once daily. 90 tablet 3    furosemide (LASIX) 40 MG tablet Take 1 tablet (40 mg  total) by mouth once daily.      HYDROcodone-acetaminophen (NORCO) 5-325 mg per tablet Take 1 tablet by mouth every 4 (four) hours as needed for Pain. 12 tablet 0    insulin aspart U-100 (NOVOLOG FLEXPEN U-100 INSULIN) 100 unit/mL (3 mL) InPn pen Inject 5 Units into the skin 3 (three) times daily with meals. If not eating, ok to hold 1 Box 3    insulin detemir U-100 (LEVEMIR FLEXTOUCH) 100 unit/mL (3 mL) SubQ InPn pen Inject 8 Units into the skin once daily. (Patient taking differently: Inject 8 Units into the skin every evening.) 3 mL 3    magnesium oxide (MAG-OX) 400 mg (241.3 mg magnesium) tablet Take 1 tablet (400 mg total) by mouth 2 (two) times daily.  0    melatonin (MELATIN) 3 mg tablet Take 2 tablets (6 mg total) by mouth nightly as needed for Insomnia.  0    mirtazapine (REMERON) 7.5 MG Tab Take 2 tablets (15 mg total) by mouth nightly. 60 tablet 11    multivit/folic acid/vit K1 (ONE-A-DAY WOMEN'S 50 PLUS ORAL) Take 1 tablet by mouth Daily.      pregabalin (LYRICA) 50 MG capsule Take 3 capsules (150 mg total) by mouth 3 (three) times daily. 90 capsule 0    sertraline (ZOLOFT) 50 MG tablet Take 1 tablet (50 mg total) by mouth once daily. 30 tablet 11    sodium bicarbonate 650 MG tablet Take 1 tablet (650 mg total) by mouth 2 (two) times daily. 60 tablet 11    [DISCONTINUED] lancing device Misc 1 Device by Misc.(Non-Drug; Combo Route) route 2 (two) times daily with meals. 1 each 0     Current Facility-Administered Medications on File Prior to Visit   Medication Dose Route Frequency Provider Last Rate Last Admin    lactated ringers infusion   Intravenous Continuous Abram Pacheco MD   New Bag at 03/09/21 0744        Review of patient's allergies indicates:   Allergen Reactions    Ciprofloxacin Itching    Contrast media      Kidney injury    Iodine      Kidney injury       OBJECTIVE:   Laboratory  Lab Results   Component Value Date    WBC 14.42 (H) 01/11/2022    HGB 7.6 (L) 01/11/2022    HCT  24.7 (L) 01/11/2022    MCV 98 01/11/2022     (H) 01/11/2022     BMP  Lab Results   Component Value Date     01/11/2022    K 4.0 01/11/2022     01/11/2022    CO2 24 01/11/2022    BUN 34 (H) 01/11/2022    CREATININE 0.9 01/11/2022    CALCIUM 8.3 (L) 01/11/2022    ANIONGAP 8 01/11/2022    ESTGFRAFRICA >60 01/11/2022    EGFRNONAA >60 01/11/2022     Lab Results   Component Value Date    ALT 12 01/11/2022    AST 24 01/11/2022    ALKPHOS 109 01/11/2022    BILITOT 0.2 01/11/2022     Lab Results   Component Value Date    INR 1.4 (H) 06/17/2021    INR 1.2 05/18/2021    INR 1.3 (H) 05/13/2021     Lab Results   Component Value Date    HGBA1C 9.6 (H) 12/03/2021       ASSESSMENT & PLAN:       S/P BKA (below knee amputation) unilateral, right  - recent hospitalization as above  - patient and family deferred above offered plan of care and will seek care at [facility] with intention of establishing new General Surgery and wound care teams          Scheduled Follow-up :  Future Appointments   Date Time Provider Department Center   1/18/2022 10:00 AM Kaitlyn Rojas MD [facility]   4/4/2022  2:30 PM Fawad Jameson MD [facility]       Post Visit Medication List:     Medication List          Accurate as of January 14, 2022  2:22 PM. If you have any questions, ask your nurse or doctor.            CHANGE how you take these medications    insulin detemir U-100 100 unit/mL (3 mL) Inpn pen  Commonly known as: Levemir FLEXTOUCH  Inject 8 Units into the skin once daily.  What changed: when to take this        CONTINUE taking these medications    acetaminophen 500 MG tablet  Commonly known as: TYLENOL  Take 2 tablets (1,000 mg total) by mouth every 8 (eight) hours as needed for Pain.     allopurinoL 100 MG tablet  Commonly known as: ZYLOPRIM  Take 1 tablet (100 mg total) by mouth once daily.     amoxicillin-clavulanate 875-125mg 875-125 mg per tablet  Commonly known as: AUGMENTIN  Take 1  tablet by mouth 2 (two) times daily.     apixaban 5 mg Tab  Commonly known as: ELIQUIS  Take 1 tablet (5 mg total) by mouth 2 (two) times daily.     atorvastatin 80 MG tablet  Commonly known as: LIPITOR  Take 1 tablet (80 mg total) by mouth every evening.     clopidogreL 75 mg tablet  Commonly known as: PLAVIX  Take 1 tablet (75 mg total) by mouth once daily.     DEXCOM G6  Misc  Generic drug: blood-glucose meter,continuous  Use to check blood sugars 4 times/day     DEXCOM G6 SENSOR Radha  Generic drug: blood-glucose sensor  Apply one sensor to skin every 10 days     DEXCOM G6 TRANSMITTER Radha  Generic drug: blood-glucose transmitter  Replace transmitter every 3 months to use with dexcom sensor     furosemide 40 MG tablet  Commonly known as: LASIX  Take 1 tablet (40 mg total) by mouth once daily.     HYDROcodone-acetaminophen 5-325 mg per tablet  Commonly known as: NORCO  Take 1 tablet by mouth every 4 (four) hours as needed for Pain.     insulin aspart U-100 100 unit/mL (3 mL) Inpn pen  Commonly known as: NovoLOG Flexpen U-100 Insulin  Inject 5 Units into the skin 3 (three) times daily with meals. If not eating, ok to hold     magnesium oxide 400 mg (241.3 mg magnesium) tablet  Commonly known as: MAG-OX  Take 1 tablet (400 mg total) by mouth 2 (two) times daily.     melatonin 3 mg tablet  Commonly known as: MELATIN  Take 2 tablets (6 mg total) by mouth nightly as needed for Insomnia.     mirtazapine 7.5 MG Tab  Commonly known as: REMERON  Take 2 tablets (15 mg total) by mouth nightly.     ONE-A-DAY WOMEN'S 50 PLUS ORAL     pregabalin 50 MG capsule  Commonly known as: LYRICA  Take 3 capsules (150 mg total) by mouth 3 (three) times daily.     sertraline 50 MG tablet  Commonly known as: ZOLOFT  Take 1 tablet (50 mg total) by mouth once daily.     sodium bicarbonate 650 MG tablet  Take 1 tablet (650 mg total) by mouth 2 (two) times daily.          Audio Only Telehealth Visit     The patient location is:  [place]   The chief complaint leading to consultation is: Hospital Follow up, post op wound concerns   Visit type: Virtual visit with audio only (telephone)  Total time spent with patient: 25 min      The reason for the audio only service rather than synchronous audio and video virtual visit was related to technical difficulties or patient preference/necessity.     Each patient to whom I provide medical services by telemedicine is:  (1) informed of the relationship between the physician and patient and the respective role of any other health care provider with respect to management of the patient; and (2) notified that they may decline to receive medical services by telemedicine and may withdraw from such care at any time. Patient verbally consented to receive this service via voice-only telephone call.     This service was not originating from a related E/M service provided within the previous 7 days nor will  to an E/M service or procedure within the next 24 hours or my soonest available appointment.  Prevailing standard of care was able to be met in this audio-only visit.      Signing Physician:  Eleanor Norwood MD

## 2022-01-14 NOTE — TELEPHONE ENCOUNTER
----- Message from Anna Victor sent at 1/13/2022  1:35 PM CST -----  Patient called regarding medication on yesterday and also had concerns on midline that was placed. I have attempted to reach out to the doctor at [place] but unable to connect. Please call patient on , Randell's phone at [phone]

## 2022-01-17 ENCOUNTER — HOSPITAL ENCOUNTER (INPATIENT)
Facility: HOSPITAL | Age: 57
LOS: 9 days | Discharge: NURSING, REHAB OR LONG-TERM CARE | DRG: 493 | End: 2022-01-29
Attending: EMERGENCY MEDICINE | Admitting: FAMILY MEDICINE
Payer: MEDICARE

## 2022-01-17 DIAGNOSIS — Z89.511 S/P BKA (BELOW KNEE AMPUTATION) UNILATERAL, RIGHT: Primary | ICD-10-CM

## 2022-01-17 DIAGNOSIS — R07.9 CHEST PAIN: ICD-10-CM

## 2022-01-17 DIAGNOSIS — L89.150 DECUBITUS ULCER OF SACRAL REGION, UNSTAGEABLE: ICD-10-CM

## 2022-01-17 DIAGNOSIS — Z51.89 VISIT FOR WOUND CHECK: ICD-10-CM

## 2022-01-17 DIAGNOSIS — Z89.519 STATUS POST BELOW KNEE AMPUTATION, UNSPECIFIED LATERALITY: ICD-10-CM

## 2022-01-17 DIAGNOSIS — T14.8XXA INFECTED WOUND: ICD-10-CM

## 2022-01-17 DIAGNOSIS — L08.9 INFECTED WOUND: ICD-10-CM

## 2022-01-17 LAB
ALBUMIN SERPL BCP-MCNC: 1.7 G/DL (ref 3.5–5.2)
ALP SERPL-CCNC: 97 U/L (ref 55–135)
ALT SERPL W/O P-5'-P-CCNC: 17 U/L (ref 10–44)
ANION GAP SERPL CALC-SCNC: 14 MMOL/L (ref 8–16)
AST SERPL-CCNC: 20 U/L (ref 10–40)
BASOPHILS # BLD AUTO: 0.05 K/UL (ref 0–0.2)
BASOPHILS NFR BLD: 0.5 % (ref 0–1.9)
BILIRUB SERPL-MCNC: 0.2 MG/DL (ref 0.1–1)
BUN SERPL-MCNC: 24 MG/DL (ref 6–20)
CALCIUM SERPL-MCNC: 8.1 MG/DL (ref 8.7–10.5)
CHLORIDE SERPL-SCNC: 107 MMOL/L (ref 95–110)
CO2 SERPL-SCNC: 20 MMOL/L (ref 23–29)
CREAT SERPL-MCNC: 1.2 MG/DL (ref 0.5–1.4)
CTP QC/QA: YES
DIFFERENTIAL METHOD: ABNORMAL
EOSINOPHIL # BLD AUTO: 0.2 K/UL (ref 0–0.5)
EOSINOPHIL NFR BLD: 1.8 % (ref 0–8)
ERYTHROCYTE [DISTWIDTH] IN BLOOD BY AUTOMATED COUNT: 14.6 % (ref 11.5–14.5)
EST. GFR  (AFRICAN AMERICAN): 59 ML/MIN/1.73 M^2
EST. GFR  (NON AFRICAN AMERICAN): 51 ML/MIN/1.73 M^2
GLUCOSE SERPL-MCNC: 250 MG/DL (ref 70–110)
HCT VFR BLD AUTO: 27.4 % (ref 37–48.5)
HGB BLD-MCNC: 8.7 G/DL (ref 12–16)
IMM GRANULOCYTES # BLD AUTO: 0.05 K/UL (ref 0–0.04)
IMM GRANULOCYTES NFR BLD AUTO: 0.5 % (ref 0–0.5)
LACTATE SERPL-SCNC: 1.5 MMOL/L (ref 0.5–2.2)
LYMPHOCYTES # BLD AUTO: 1.4 K/UL (ref 1–4.8)
LYMPHOCYTES NFR BLD: 13.4 % (ref 18–48)
MCH RBC QN AUTO: 31 PG (ref 27–31)
MCHC RBC AUTO-ENTMCNC: 31.8 G/DL (ref 32–36)
MCV RBC AUTO: 98 FL (ref 82–98)
MONOCYTES # BLD AUTO: 0.7 K/UL (ref 0.3–1)
MONOCYTES NFR BLD: 6.8 % (ref 4–15)
NEUTROPHILS # BLD AUTO: 8.2 K/UL (ref 1.8–7.7)
NEUTROPHILS NFR BLD: 77 % (ref 38–73)
NRBC BLD-RTO: 0 /100 WBC
PLATELET # BLD AUTO: 404 K/UL (ref 150–450)
PMV BLD AUTO: 11 FL (ref 9.2–12.9)
POCT GLUCOSE: 234 MG/DL (ref 70–110)
POCT GLUCOSE: 269 MG/DL (ref 70–110)
POTASSIUM SERPL-SCNC: 4 MMOL/L (ref 3.5–5.1)
PROT SERPL-MCNC: 6.6 G/DL (ref 6–8.4)
RBC # BLD AUTO: 2.81 M/UL (ref 4–5.4)
SARS-COV-2 RDRP RESP QL NAA+PROBE: NEGATIVE
SODIUM SERPL-SCNC: 141 MMOL/L (ref 136–145)
WBC # BLD AUTO: 10.66 K/UL (ref 3.9–12.7)

## 2022-01-17 PROCEDURE — C9399 UNCLASSIFIED DRUGS OR BIOLOG: HCPCS

## 2022-01-17 PROCEDURE — 25000003 PHARM REV CODE 250: Performed by: PHYSICIAN ASSISTANT

## 2022-01-17 PROCEDURE — 96365 THER/PROPH/DIAG IV INF INIT: CPT | Mod: HCNC

## 2022-01-17 PROCEDURE — 85025 COMPLETE CBC W/AUTO DIFF WBC: CPT | Performed by: PHYSICIAN ASSISTANT

## 2022-01-17 PROCEDURE — G0378 HOSPITAL OBSERVATION PER HR: HCPCS

## 2022-01-17 PROCEDURE — 87186 SC STD MICRODIL/AGAR DIL: CPT | Mod: 59,HCNC | Performed by: PHYSICIAN ASSISTANT

## 2022-01-17 PROCEDURE — 63600175 PHARM REV CODE 636 W HCPCS: Performed by: PHYSICIAN ASSISTANT

## 2022-01-17 PROCEDURE — 80053 COMPREHEN METABOLIC PANEL: CPT | Performed by: PHYSICIAN ASSISTANT

## 2022-01-17 PROCEDURE — U0002 COVID-19 LAB TEST NON-CDC: HCPCS | Performed by: EMERGENCY MEDICINE

## 2022-01-17 PROCEDURE — 87040 BLOOD CULTURE FOR BACTERIA: CPT | Performed by: PHYSICIAN ASSISTANT

## 2022-01-17 PROCEDURE — 63600175 PHARM REV CODE 636 W HCPCS

## 2022-01-17 PROCEDURE — 87070 CULTURE OTHR SPECIMN AEROBIC: CPT | Performed by: PHYSICIAN ASSISTANT

## 2022-01-17 PROCEDURE — 25000003 PHARM REV CODE 250: Performed by: FAMILY MEDICINE

## 2022-01-17 PROCEDURE — 63600175 PHARM REV CODE 636 W HCPCS: Performed by: FAMILY MEDICINE

## 2022-01-17 PROCEDURE — 96372 THER/PROPH/DIAG INJ SC/IM: CPT | Mod: HCNC

## 2022-01-17 PROCEDURE — 87077 CULTURE AEROBIC IDENTIFY: CPT | Mod: HCNC | Performed by: PHYSICIAN ASSISTANT

## 2022-01-17 PROCEDURE — 83605 ASSAY OF LACTIC ACID: CPT | Performed by: PHYSICIAN ASSISTANT

## 2022-01-17 PROCEDURE — 82962 GLUCOSE BLOOD TEST: CPT | Mod: HCNC

## 2022-01-17 PROCEDURE — 25000003 PHARM REV CODE 250

## 2022-01-17 PROCEDURE — 99285 EMERGENCY DEPT VISIT HI MDM: CPT | Mod: 25,HCNC

## 2022-01-17 RX ORDER — MAG HYDROX/ALUMINUM HYD/SIMETH 200-200-20
30 SUSPENSION, ORAL (FINAL DOSE FORM) ORAL 4 TIMES DAILY PRN
Status: DISCONTINUED | OUTPATIENT
Start: 2022-01-17 | End: 2022-01-30 | Stop reason: HOSPADM

## 2022-01-17 RX ORDER — ENOXAPARIN SODIUM 100 MG/ML
40 INJECTION SUBCUTANEOUS EVERY 24 HOURS
Status: DISCONTINUED | OUTPATIENT
Start: 2022-01-17 | End: 2022-01-17

## 2022-01-17 RX ORDER — SODIUM BICARBONATE 650 MG/1
650 TABLET ORAL 2 TIMES DAILY
Status: DISCONTINUED | OUTPATIENT
Start: 2022-01-17 | End: 2022-01-30 | Stop reason: HOSPADM

## 2022-01-17 RX ORDER — PREGABALIN 75 MG/1
150 CAPSULE ORAL 3 TIMES DAILY
Status: DISCONTINUED | OUTPATIENT
Start: 2022-01-17 | End: 2022-01-30 | Stop reason: HOSPADM

## 2022-01-17 RX ORDER — GLUCAGON 1 MG
1 KIT INJECTION
Status: DISCONTINUED | OUTPATIENT
Start: 2022-01-17 | End: 2022-01-30 | Stop reason: HOSPADM

## 2022-01-17 RX ORDER — TALC
6 POWDER (GRAM) TOPICAL NIGHTLY PRN
Status: DISCONTINUED | OUTPATIENT
Start: 2022-01-17 | End: 2022-01-30 | Stop reason: HOSPADM

## 2022-01-17 RX ORDER — INSULIN ASPART 100 [IU]/ML
0-5 INJECTION, SOLUTION INTRAVENOUS; SUBCUTANEOUS
Status: DISCONTINUED | OUTPATIENT
Start: 2022-01-17 | End: 2022-01-30 | Stop reason: HOSPADM

## 2022-01-17 RX ORDER — CEFAZOLIN SODIUM 1 G/50ML
1 SOLUTION INTRAVENOUS
Status: DISCONTINUED | OUTPATIENT
Start: 2022-01-17 | End: 2022-01-19

## 2022-01-17 RX ORDER — ALLOPURINOL 100 MG/1
100 TABLET ORAL DAILY
Status: DISCONTINUED | OUTPATIENT
Start: 2022-01-18 | End: 2022-01-30 | Stop reason: HOSPADM

## 2022-01-17 RX ORDER — SIMETHICONE 80 MG
1 TABLET,CHEWABLE ORAL 4 TIMES DAILY PRN
Status: DISCONTINUED | OUTPATIENT
Start: 2022-01-17 | End: 2022-01-30 | Stop reason: HOSPADM

## 2022-01-17 RX ORDER — SODIUM CHLORIDE 0.9 % (FLUSH) 0.9 %
10 SYRINGE (ML) INJECTION EVERY 8 HOURS PRN
Status: DISCONTINUED | OUTPATIENT
Start: 2022-01-17 | End: 2022-01-30 | Stop reason: HOSPADM

## 2022-01-17 RX ORDER — HYDRALAZINE HYDROCHLORIDE 20 MG/ML
10 INJECTION INTRAMUSCULAR; INTRAVENOUS EVERY 6 HOURS PRN
Status: DISCONTINUED | OUTPATIENT
Start: 2022-01-17 | End: 2022-01-30 | Stop reason: HOSPADM

## 2022-01-17 RX ORDER — FUROSEMIDE 40 MG/1
40 TABLET ORAL DAILY
Status: DISCONTINUED | OUTPATIENT
Start: 2022-01-18 | End: 2022-01-23

## 2022-01-17 RX ORDER — ATORVASTATIN CALCIUM 40 MG/1
80 TABLET, FILM COATED ORAL NIGHTLY
Status: DISCONTINUED | OUTPATIENT
Start: 2022-01-17 | End: 2022-01-30 | Stop reason: HOSPADM

## 2022-01-17 RX ORDER — ACETAMINOPHEN 325 MG/1
650 TABLET ORAL EVERY 4 HOURS PRN
Status: DISCONTINUED | OUTPATIENT
Start: 2022-01-17 | End: 2022-01-30 | Stop reason: HOSPADM

## 2022-01-17 RX ORDER — CLOPIDOGREL BISULFATE 75 MG/1
75 TABLET ORAL DAILY
Status: DISCONTINUED | OUTPATIENT
Start: 2022-01-18 | End: 2022-01-30 | Stop reason: HOSPADM

## 2022-01-17 RX ORDER — IBUPROFEN 200 MG
24 TABLET ORAL
Status: DISCONTINUED | OUTPATIENT
Start: 2022-01-17 | End: 2022-01-30 | Stop reason: HOSPADM

## 2022-01-17 RX ORDER — TALC
6 POWDER (GRAM) TOPICAL NIGHTLY PRN
Status: DISCONTINUED | OUTPATIENT
Start: 2022-01-17 | End: 2022-01-17

## 2022-01-17 RX ORDER — SERTRALINE HYDROCHLORIDE 50 MG/1
50 TABLET, FILM COATED ORAL DAILY
Status: DISCONTINUED | OUTPATIENT
Start: 2022-01-18 | End: 2022-01-30 | Stop reason: HOSPADM

## 2022-01-17 RX ORDER — HYDROCODONE BITARTRATE AND ACETAMINOPHEN 5; 325 MG/1; MG/1
1 TABLET ORAL EVERY 4 HOURS PRN
Status: DISCONTINUED | OUTPATIENT
Start: 2022-01-17 | End: 2022-01-24

## 2022-01-17 RX ORDER — IBUPROFEN 200 MG
16 TABLET ORAL
Status: DISCONTINUED | OUTPATIENT
Start: 2022-01-17 | End: 2022-01-30 | Stop reason: HOSPADM

## 2022-01-17 RX ORDER — MIRTAZAPINE 7.5 MG/1
15 TABLET, FILM COATED ORAL NIGHTLY
Status: DISCONTINUED | OUTPATIENT
Start: 2022-01-17 | End: 2022-01-30 | Stop reason: HOSPADM

## 2022-01-17 RX ORDER — ONDANSETRON 2 MG/ML
4 INJECTION INTRAMUSCULAR; INTRAVENOUS EVERY 8 HOURS PRN
Status: DISCONTINUED | OUTPATIENT
Start: 2022-01-17 | End: 2022-01-30 | Stop reason: HOSPADM

## 2022-01-17 RX ADMIN — ATORVASTATIN CALCIUM 80 MG: 40 TABLET, FILM COATED ORAL at 08:01

## 2022-01-17 RX ADMIN — APIXABAN 5 MG: 5 TABLET, FILM COATED ORAL at 08:01

## 2022-01-17 RX ADMIN — HYDROCODONE BITARTRATE AND ACETAMINOPHEN 1 TABLET: 5; 325 TABLET ORAL at 08:01

## 2022-01-17 RX ADMIN — INSULIN ASPART 2 UNITS: 100 INJECTION, SOLUTION INTRAVENOUS; SUBCUTANEOUS at 06:01

## 2022-01-17 RX ADMIN — HYDRALAZINE HYDROCHLORIDE 10 MG: 20 INJECTION INTRAMUSCULAR; INTRAVENOUS at 08:01

## 2022-01-17 RX ADMIN — PREGABALIN 150 MG: 75 CAPSULE ORAL at 08:01

## 2022-01-17 RX ADMIN — SODIUM BICARBONATE 650 MG: 650 TABLET ORAL at 08:01

## 2022-01-17 RX ADMIN — VANCOMYCIN HYDROCHLORIDE 1750 MG: 10 INJECTION, POWDER, LYOPHILIZED, FOR SOLUTION INTRAVENOUS at 10:01

## 2022-01-17 RX ADMIN — PIPERACILLIN AND TAZOBACTAM 4.5 G: 4; .5 INJECTION, POWDER, LYOPHILIZED, FOR SOLUTION INTRAVENOUS; PARENTERAL at 05:01

## 2022-01-17 RX ADMIN — MIRTAZAPINE 15 MG: 7.5 TABLET ORAL at 08:01

## 2022-01-17 RX ADMIN — INSULIN DETEMIR 10 UNITS: 100 INJECTION, SOLUTION SUBCUTANEOUS at 08:01

## 2022-01-17 RX ADMIN — CEFAZOLIN SODIUM 1 G: 1 SOLUTION INTRAVENOUS at 09:01

## 2022-01-17 RX ADMIN — ENOXAPARIN SODIUM 40 MG: 100 INJECTION SUBCUTANEOUS at 06:01

## 2022-01-17 RX ADMIN — INSULIN ASPART 3 UNITS: 100 INJECTION, SOLUTION INTRAVENOUS; SUBCUTANEOUS at 09:01

## 2022-01-17 NOTE — ED PROVIDER NOTES
Encounter Date: 1/17/2022       History     Chief Complaint   Patient presents with    Post-op Problem     Pt was recently seen in ED and admitted for amputation of below the knee on December 28th, 2021. Pt reports she came back in on Thursday sent home ABX and pain medication. Pt reports in creased pain and feels she was sent home too early.      56-year-old female presents to ED with  with continue concern for wound infection.  Patient underwent right BKA by Dr. Rojas on 12/28 then discharged to rehab facility on 1/10.  Rehab facility staff became concerned for possible wound infection and patient was sent back to ED on 1/11.  Lab work was obtained and images of wound were reviewed with Dr. Rojas, who recommended continued outpatient management.  At discharge patient was prescribed Augmentin, which she has continued taking as instructed.   at bedside is concern for progressively worsening appearance of wound, along with continued drainage.  Patient reporting worsened pain today described as sharp, worse with touch, radiating towards right knee, severity 8/10.  No fevers, chills, nausea, vomiting, chest pain, cough, shortness of breath, abdominal pain, urinary or bowel complications.  She does admit to not taking her BP medication today.  No other acute complaints at this time.    The history is provided by the patient.     Review of patient's allergies indicates:   Allergen Reactions    Ciprofloxacin Itching    Contrast media      Kidney injury    Iodine      Kidney injury     Past Medical History:   Diagnosis Date    Anxiety     Chronic pain syndrome     CKD (chronic kidney disease), stage III     Depression     Diabetes mellitus     type 2    Diabetes mellitus, type 2     GERD (gastroesophageal reflux disease)     Hyperemesis 3/23/2021    Hyperlipemia     Hypertension     Hypokalemia 3/23/2021    Myocardial infarction 2010    minor-caused by stress per pt.    Osteomyelitis      Osteomyelitis of left foot 4/30/2021    PVD (peripheral vascular disease)     Vaginal delivery     x1     Past Surgical History:   Procedure Laterality Date    ABOVE-KNEE AMPUTATION Left 5/18/2021    Procedure: AMPUTATION, ABOVE KNEE;  Surgeon: Teddy Huber MD;  Location: [address];  Service: Vascular;  Laterality: Left;    Angiogram - Right Extremity Right 7/9/15    angiogram-left leg  10/6/15    ANGIOGRAPHY OF LOWER EXTREMITY Left 4/29/2021    Procedure: ANGIOGRAM, LOWER EXTREMITY;  Surgeon: Teddy Huber MD;  Location: [address];  Service: Vascular;  Laterality: Left;    BELOW KNEE AMPUTATION OF LOWER EXTREMITY Right 12/28/2021    Procedure: AMPUTATION, BELOW KNEE;  Surgeon: Kaitlyn Rojas MD;  Location: [facility];  Service: General;  Laterality: Right;    CATHETERIZATION OF BOTH LEFT AND RIGHT HEART N/A 12/18/2019    Procedure: CATHETERIZATION, HEART, BOTH LEFT AND RIGHT;  Surgeon: Que Fernando III, MD;  Location: [facility] CATH LAB;  Service: Cardiology;  Laterality: N/A;    CORONARY ANGIOGRAPHY N/A 12/18/2019    Procedure: ANGIOGRAM, CORONARY ARTERY;  Surgeon: Que Fernando III, MD;  Location: [facility] CATH LAB;  Service: Cardiology;  Laterality: N/A;    CORONARY ANGIOGRAPHY INCLUDING BYPASS GRAFTS WITH CATHETERIZATION OF LEFT HEART N/A 7/28/2020    Procedure: ANGIOGRAM, CORONARY, INCLUDING BYPASS GRAFT, WITH LEFT HEART CATHETERIZATION, 9 am;  Surgeon: Rachel Easley MD;  Location: [facility] CATH LAB;  Service: Cardiology;  Laterality: N/A;    CORONARY ARTERY BYPASS GRAFT (CABG) N/A 1/14/2020    Procedure: CORONARY ARTERY BYPASS GRAFT (CABG) x 1     Off Pump;  Surgeon: Huang Altamirano MD;  Location: [address];  Service: Cardiovascular;  Laterality: N/A;    CREATION OF FEMORAL-TIBIAL ARTERY BYPASS Left 4/29/2021    Procedure: CREATION, BYPASS, ARTERIAL, FEMORAL TO ANTERIOR TIBIAL;  Surgeon: Teddy Huber MD;  Location: [address];  Service:  Vascular;  Laterality: Left;    CREATION OF FEMOROPOPLITEAL ARTERIAL BYPASS USING GRAFT Left 8/18/2020    Procedure: CREATION, BYPASS, ARTERIAL, FEMORAL TO POPLITEAL, USING GRAFT, LEFT LOWER EXTREMITY;  Surgeon: Teddy Huber MD;  Location: [facility] OR;  Service: Vascular;  Laterality: Left;  REQUEST 7:15 A.M. START----COVID NEGATIVE ON 8/17  1ST CASE STARTE PER LEANA ON 8/7/2020 @ 942AM-  RN PREOP 8/12/2020   T/S-----CLEARED BY CARDS-------PENDING INSURANCE    DEBRIDEMENT OF FOOT Left 9/8/2020    Procedure: DEBRIDEMENT, FOOT;  Surgeon: Rosio Mayes DPM;  Location: [facility] OR;  Service: Podiatry;  Laterality: Left;  request neoxx .   RN Pre Op 9-4-2020, Covid negative on 9/5/20. C A    DEBRIDEMENT OF FOOT  3/4/2021    Procedure: DEBRIDEMENT, FOOT;  Surgeon: Teddy Huber MD;  Location: [facility] OR;  Service: Vascular;;    DEBRIDEMENT OF FOOT Left 3/9/2021    Procedure: DEBRIDEMENT, FOOT, bone biopsy;  Surgeon: Rosio Mayes DPM;  Location: [facility] OR;  Service: Podiatry;  Laterality: Left;  Request neoxx---COVID IN AM  REQUESTING NOON START  RN Phone Pre op.On Blood thinners Plavix and Eliquis.  Covid am of surgery. C A    DEBRIDEMENT OF FOOT Left 5/4/2021    Procedure: DEBRIDEMENT, FOOT;  Surgeon: Farooq Morley DPM;  Location: [address];  Service: Podiatry;  Laterality: Left;    INSERTION OF TUNNELED CENTRAL VENOUS HEMODIALYSIS CATHETER N/A 1/27/2020    Procedure: Insertion, Catheter, Central Venous, Hemodialysis;  Surgeon: ESTEBAN Gomez III, MD;  Location: [facility] CATH LAB;  Service: Peripheral Vascular;  Laterality: N/A;    PERCUTANEOUS TRANSLUMINAL ANGIOPLASTY N/A 3/4/2021    Procedure: PTA (ANGIOPLASTY, PERCUTANEOUS, TRANSLUMINAL);  Surgeon: Teddy Huber MD;  Location: [facility] OR;  Service: Vascular;  Laterality: N/A;    REMOVAL OF ARTERIOVENOUS GRAFT Left 5/27/2021    Procedure: REMOVAL, GRAFT, LEFT LOWER EXTREMITY, WOUND EXPLORATION;  Surgeon: Teddy Huber MD;   Location: [facility] OR 2ND FLR;  Service: Vascular;  Laterality: Left;    REMOVAL OF NAIL OF DIGIT Left 3/9/2021    Procedure: REMOVAL, NAIL, DIGIT;  Surgeon: Rosio Mayes DPM;  Location: [facility] OR;  Service: Podiatry;  Laterality: Left;    THROMBECTOMY Left 3/4/2021    Procedure: THROMBECTOMY, LEFT LOWER EXTREMITY BYPASS GRAFT, ANGIOGRAM, POSSIBLE INTERVENTION, POSSIBLE LEFT LOWER EXTREMITY BYPASS;  Surgeon: Teddy Huber MD;  Location: [facility] OR;  Service: Vascular;  Laterality: Left;    THROMBECTOMY Left 4/29/2021    Procedure: GRAFT THROMBECTOMY, LEFT LOWER EXTREMITY;  Surgeon: Teddy Huber MD;  Location: [facility] OR 2ND FLR;  Service: Vascular;  Laterality: Left;  14.5 min  1179.85 mGy  341.01 Gycm2  240 ml dye    THROMBECTOMY  10/22/2021    Procedure: THROMBECTOMY;  Surgeon: Saad Arenas MD;  Location: [facility] CATH LAB/EP;  Service: Cardiology;;     Family History   Problem Relation Age of Onset    Diabetes Mother     Diabetes Father     Heart disease Maternal Grandmother     No Known Problems Maternal Grandfather     Diabetes Paternal Grandmother     No Known Problems Paternal Grandfather     Anesthesia problems Neg Hx      Social History     Tobacco Use    Smoking status: Former Smoker    Smokeless tobacco: Never Used   Substance Use Topics    Alcohol use: No    Drug use: Yes     Types: Marijuana     Comment: occassional     Review of Systems   Constitutional: Negative for chills and fever.   Respiratory: Negative for cough and shortness of breath.    Cardiovascular: Negative for chest pain.   Gastrointestinal: Negative for abdominal pain, diarrhea, nausea and vomiting.   Genitourinary: Negative for dysuria.   Skin: Positive for wound.       Physical Exam     Initial Vitals [01/17/22 1431]   BP Pulse Resp Temp SpO2   (!) 230/94 100 18 98.3 °F (36.8 °C) 99 %      MAP       --         Physical Exam    Nursing note and vitals reviewed.  Constitutional: She appears well-developed and  well-nourished. She is Obese . She is cooperative. She does not have a sickly appearance. She does not appear ill. No distress.   HENT:   Head: Normocephalic and atraumatic.   Eyes: EOM are normal.   Neck: Neck supple.   Normal range of motion.  Cardiovascular: Normal rate, regular rhythm and normal heart sounds.   Pulmonary/Chest: Effort normal and breath sounds normal.   Abdominal: Abdomen is soft. There is no abdominal tenderness.   Musculoskeletal:      Cervical back: Normal range of motion and neck supple.      Comments: S/p right BKA.  Staples remaining intact.  Wound dehisced, primary to lateral surface.  Serous colored drainage noted throughout bandages with small amount of active drainage from wound.  Eschar noted primarily to lateral surface.  No surrounding erythema.  No significant warmth.  See images below  Left BKA at baseline, uncomplicated.  No open wounds.  Superficial wound noted to sacrum, uncomplicated currently, no current drainage.  Otherwise appearing to be healing well.     Neurological: She is alert. She is not disoriented. GCS eye subscore is 4. GCS verbal subscore is 5. GCS motor subscore is 6.   Skin: Skin is warm and dry.   Psychiatric: She has a normal mood and affect. Her speech is normal and behavior is normal. Thought content normal.                 ED Course   Procedures  Labs Reviewed   CBC W/ AUTO DIFFERENTIAL - Abnormal; Notable for the following components:       Result Value    RBC 2.81 (*)     Hemoglobin 8.7 (*)     Hematocrit 27.4 (*)     MCHC 31.8 (*)     RDW 14.6 (*)     Gran # (ANC) 8.2 (*)     Immature Grans (Abs) 0.05 (*)     Gran % 77.0 (*)     Lymph % 13.4 (*)     All other components within normal limits   COMPREHENSIVE METABOLIC PANEL - Abnormal; Notable for the following components:    CO2 20 (*)     Glucose 250 (*)     BUN 24 (*)     Calcium 8.1 (*)     Albumin 1.7 (*)     eGFR if  59 (*)     eGFR if non  51 (*)     All other  components within normal limits   CULTURE, BLOOD   CULTURE, BLOOD   CULTURE, AEROBIC  (SPECIFY SOURCE)   LACTIC ACID, PLASMA   SARS-COV-2 (COVID-19) QUALITATIVE PCR   POCT GLUCOSE, HAND-HELD DEVICE          Imaging Results    None          Medications   piperacillin-tazobactam 4.5 g in dextrose 5 % 100 mL IVPB (ready to mix system) (has no administration in time range)   sodium chloride 0.9% flush 10 mL (has no administration in time range)   acetaminophen tablet 650 mg (has no administration in time range)   ondansetron injection 4 mg (has no administration in time range)   insulin aspart U-100 pen 0-5 Units (has no administration in time range)   glucose chewable tablet 16 g (has no administration in time range)   glucose chewable tablet 24 g (has no administration in time range)   dextrose 50% injection 12.5 g (has no administration in time range)   dextrose 50% injection 25 g (has no administration in time range)   glucagon (human recombinant) injection 1 mg (has no administration in time range)   melatonin tablet 6 mg (has no administration in time range)   simethicone chewable tablet 80 mg (has no administration in time range)   aluminum-magnesium hydroxide-simethicone 200-200-20 mg/5 mL suspension 30 mL (has no administration in time range)   enoxaparin injection 40 mg (has no administration in time range)   insulin detemir U-100 pen 10 Units (has no administration in time range)     Medical Decision Making:   Initial Assessment:   Patient presents with  with concern of continued worsening appearance of wound, s/p right BKA 12/28.  Has continued Augmentin as instructed.  Patient reporting worsened pain and  reporting worsened drainage.  Afebrile arrival.  On exam, wound dehiscence noted surrounding BKA closure site with serous drainage.  No significant warmth or erythema.  Differential Diagnosis:   Wound check, wound dehiscence, infection, cellulitis  ED Management:  CBC/CMP, lactic, BC  x2    Patient noted have elevated BP on arrival.  Recheck a BP showing improvement without intervention. The patient has no associated signs or symptoms of hypertension.  Patient's blood pressure is likely elevated due to situation.  Will continue to monitor closely.     Labs grossly unremarkable near baseline.  Lactic WNL.  BC x2 pending.  Wound cultures pending.  Patient was discussed with Dr. Rojas, who is requesting patient to be admitted Hospital Medicine Services with consult to him.  Will place on IV Zosyn.    Patient discussed with [facility] Medicine team, who has accepted patient for observation.                          Clinical Impression:   Final diagnoses:  [Z51.89] Visit for wound check (Primary)  [Z89.519] Status post below knee amputation, unspecified laterality          ED Disposition Condition    Observation               Trey Schumacher PA-C  01/17/22 4604

## 2022-01-17 NOTE — ED NOTES
Dressing changed to midline access on right upper arm using sterile technique by RN. Pt tolerated well.

## 2022-01-18 PROBLEM — L89.150 DECUBITUS ULCER OF SACRAL REGION, UNSTAGEABLE: Status: ACTIVE | Noted: 2022-01-18

## 2022-01-18 LAB
ALBUMIN SERPL BCP-MCNC: 1.6 G/DL (ref 3.5–5.2)
ALP SERPL-CCNC: 100 U/L (ref 55–135)
ALT SERPL W/O P-5'-P-CCNC: 13 U/L (ref 10–44)
ANION GAP SERPL CALC-SCNC: 11 MMOL/L (ref 8–16)
AST SERPL-CCNC: 14 U/L (ref 10–40)
BASOPHILS # BLD AUTO: 0.05 K/UL (ref 0–0.2)
BASOPHILS NFR BLD: 0.6 % (ref 0–1.9)
BILIRUB SERPL-MCNC: 0.2 MG/DL (ref 0.1–1)
BUN SERPL-MCNC: 23 MG/DL (ref 6–20)
CALCIUM SERPL-MCNC: 7.7 MG/DL (ref 8.7–10.5)
CHLORIDE SERPL-SCNC: 106 MMOL/L (ref 95–110)
CO2 SERPL-SCNC: 20 MMOL/L (ref 23–29)
CREAT SERPL-MCNC: 1.1 MG/DL (ref 0.5–1.4)
DIFFERENTIAL METHOD: ABNORMAL
EOSINOPHIL # BLD AUTO: 0.3 K/UL (ref 0–0.5)
EOSINOPHIL NFR BLD: 3 % (ref 0–8)
ERYTHROCYTE [DISTWIDTH] IN BLOOD BY AUTOMATED COUNT: 14.5 % (ref 11.5–14.5)
EST. GFR  (AFRICAN AMERICAN): >60 ML/MIN/1.73 M^2
EST. GFR  (NON AFRICAN AMERICAN): 57 ML/MIN/1.73 M^2
GLUCOSE SERPL-MCNC: 176 MG/DL (ref 70–110)
HCT VFR BLD AUTO: 26.1 % (ref 37–48.5)
HGB BLD-MCNC: 8.2 G/DL (ref 12–16)
IMM GRANULOCYTES # BLD AUTO: 0.02 K/UL (ref 0–0.04)
IMM GRANULOCYTES NFR BLD AUTO: 0.2 % (ref 0–0.5)
LYMPHOCYTES # BLD AUTO: 2 K/UL (ref 1–4.8)
LYMPHOCYTES NFR BLD: 23.3 % (ref 18–48)
MCH RBC QN AUTO: 30.3 PG (ref 27–31)
MCHC RBC AUTO-ENTMCNC: 31.4 G/DL (ref 32–36)
MCV RBC AUTO: 96 FL (ref 82–98)
MONOCYTES # BLD AUTO: 0.7 K/UL (ref 0.3–1)
MONOCYTES NFR BLD: 8.1 % (ref 4–15)
NEUTROPHILS # BLD AUTO: 5.6 K/UL (ref 1.8–7.7)
NEUTROPHILS NFR BLD: 64.8 % (ref 38–73)
NRBC BLD-RTO: 0 /100 WBC
PLATELET # BLD AUTO: 357 K/UL (ref 150–450)
PMV BLD AUTO: 11.3 FL (ref 9.2–12.9)
POCT GLUCOSE: 184 MG/DL (ref 70–110)
POCT GLUCOSE: 217 MG/DL (ref 70–110)
POCT GLUCOSE: 261 MG/DL (ref 70–110)
POTASSIUM SERPL-SCNC: 3.5 MMOL/L (ref 3.5–5.1)
PROT SERPL-MCNC: 6 G/DL (ref 6–8.4)
RBC # BLD AUTO: 2.71 M/UL (ref 4–5.4)
SODIUM SERPL-SCNC: 137 MMOL/L (ref 136–145)
WBC # BLD AUTO: 8.68 K/UL (ref 3.9–12.7)

## 2022-01-18 PROCEDURE — 63600175 PHARM REV CODE 636 W HCPCS: Mod: HCNC

## 2022-01-18 PROCEDURE — 97530 THERAPEUTIC ACTIVITIES: CPT

## 2022-01-18 PROCEDURE — 36415 COLL VENOUS BLD VENIPUNCTURE: CPT

## 2022-01-18 PROCEDURE — 25000003 PHARM REV CODE 250: Mod: HCNC | Performed by: FAMILY MEDICINE

## 2022-01-18 PROCEDURE — 85025 COMPLETE CBC W/AUTO DIFF WBC: CPT | Mod: HCNC

## 2022-01-18 PROCEDURE — 97161 PT EVAL LOW COMPLEX 20 MIN: CPT

## 2022-01-18 PROCEDURE — 25000003 PHARM REV CODE 250: Performed by: SURGERY

## 2022-01-18 PROCEDURE — 63600175 PHARM REV CODE 636 W HCPCS: Mod: HCNC | Performed by: PHYSICIAN ASSISTANT

## 2022-01-18 PROCEDURE — G0378 HOSPITAL OBSERVATION PER HR: HCPCS | Mod: HCNC

## 2022-01-18 PROCEDURE — 63600175 PHARM REV CODE 636 W HCPCS: Mod: HCNC | Performed by: FAMILY MEDICINE

## 2022-01-18 PROCEDURE — 97165 OT EVAL LOW COMPLEX 30 MIN: CPT

## 2022-01-18 PROCEDURE — 94761 N-INVAS EAR/PLS OXIMETRY MLT: CPT

## 2022-01-18 PROCEDURE — 80053 COMPREHEN METABOLIC PANEL: CPT | Mod: HCNC

## 2022-01-18 PROCEDURE — C9399 UNCLASSIFIED DRUGS OR BIOLOG: HCPCS | Mod: HCNC | Performed by: PHYSICIAN ASSISTANT

## 2022-01-18 PROCEDURE — 25000003 PHARM REV CODE 250: Mod: HCNC | Performed by: PHYSICIAN ASSISTANT

## 2022-01-18 PROCEDURE — 25000003 PHARM REV CODE 250

## 2022-01-18 RX ORDER — INSULIN ASPART 100 [IU]/ML
2 INJECTION, SOLUTION INTRAVENOUS; SUBCUTANEOUS
Status: DISCONTINUED | OUTPATIENT
Start: 2022-01-18 | End: 2022-01-20

## 2022-01-18 RX ORDER — GLUCAGON 1 MG
1 KIT INJECTION
Status: DISCONTINUED | OUTPATIENT
Start: 2022-01-18 | End: 2022-01-30 | Stop reason: HOSPADM

## 2022-01-18 RX ORDER — IBUPROFEN 200 MG
24 TABLET ORAL
Status: DISCONTINUED | OUTPATIENT
Start: 2022-01-18 | End: 2022-01-30 | Stop reason: HOSPADM

## 2022-01-18 RX ORDER — IBUPROFEN 200 MG
16 TABLET ORAL
Status: DISCONTINUED | OUTPATIENT
Start: 2022-01-18 | End: 2022-01-30 | Stop reason: HOSPADM

## 2022-01-18 RX ADMIN — INSULIN DETEMIR 8 UNITS: 100 INJECTION, SOLUTION SUBCUTANEOUS at 08:01

## 2022-01-18 RX ADMIN — INSULIN ASPART 2 UNITS: 100 INJECTION, SOLUTION INTRAVENOUS; SUBCUTANEOUS at 02:01

## 2022-01-18 RX ADMIN — VANCOMYCIN HYDROCHLORIDE 1500 MG: 1.5 INJECTION, POWDER, LYOPHILIZED, FOR SOLUTION INTRAVENOUS at 08:01

## 2022-01-18 RX ADMIN — INSULIN ASPART 2 UNITS: 100 INJECTION, SOLUTION INTRAVENOUS; SUBCUTANEOUS at 05:01

## 2022-01-18 RX ADMIN — PREGABALIN 150 MG: 75 CAPSULE ORAL at 08:01

## 2022-01-18 RX ADMIN — ATORVASTATIN CALCIUM 80 MG: 40 TABLET, FILM COATED ORAL at 08:01

## 2022-01-18 RX ADMIN — APIXABAN 5 MG: 5 TABLET, FILM COATED ORAL at 08:01

## 2022-01-18 RX ADMIN — SODIUM BICARBONATE 650 MG: 650 TABLET ORAL at 10:01

## 2022-01-18 RX ADMIN — HYDRALAZINE HYDROCHLORIDE 10 MG: 20 INJECTION INTRAMUSCULAR; INTRAVENOUS at 08:01

## 2022-01-18 RX ADMIN — SERTRALINE HYDROCHLORIDE 50 MG: 50 TABLET ORAL at 10:01

## 2022-01-18 RX ADMIN — PREGABALIN 150 MG: 75 CAPSULE ORAL at 10:01

## 2022-01-18 RX ADMIN — PREGABALIN 150 MG: 75 CAPSULE ORAL at 02:01

## 2022-01-18 RX ADMIN — MIRTAZAPINE 15 MG: 7.5 TABLET ORAL at 08:01

## 2022-01-18 RX ADMIN — INSULIN ASPART 3 UNITS: 100 INJECTION, SOLUTION INTRAVENOUS; SUBCUTANEOUS at 05:01

## 2022-01-18 RX ADMIN — CLOPIDOGREL 75 MG: 75 TABLET, FILM COATED ORAL at 10:01

## 2022-01-18 RX ADMIN — SODIUM BICARBONATE 650 MG: 650 TABLET ORAL at 08:01

## 2022-01-18 RX ADMIN — CEFAZOLIN SODIUM 1 G: 1 SOLUTION INTRAVENOUS at 03:01

## 2022-01-18 RX ADMIN — ALLOPURINOL 100 MG: 100 TABLET ORAL at 10:01

## 2022-01-18 RX ADMIN — HYDROCODONE BITARTRATE AND ACETAMINOPHEN 1 TABLET: 5; 325 TABLET ORAL at 08:01

## 2022-01-18 RX ADMIN — COLLAGENASE SANTYL: 250 OINTMENT TOPICAL at 10:01

## 2022-01-18 RX ADMIN — APIXABAN 5 MG: 5 TABLET, FILM COATED ORAL at 10:01

## 2022-01-18 RX ADMIN — HYDROCODONE BITARTRATE AND ACETAMINOPHEN 1 TABLET: 5; 325 TABLET ORAL at 10:01

## 2022-01-18 RX ADMIN — CEFAZOLIN SODIUM 1 G: 1 SOLUTION INTRAVENOUS at 07:01

## 2022-01-18 RX ADMIN — FUROSEMIDE 40 MG: 40 TABLET ORAL at 10:01

## 2022-01-18 RX ADMIN — CEFAZOLIN SODIUM 1 G: 1 SOLUTION INTRAVENOUS at 02:01

## 2022-01-18 RX ADMIN — HYDROCODONE BITARTRATE AND ACETAMINOPHEN 1 TABLET: 5; 325 TABLET ORAL at 02:01

## 2022-01-18 NOTE — ASSESSMENT & PLAN NOTE
S/p right bka with Dr. Rojas on 12/28/21  Presents to ED with concerns for wound infection  BKA site with dehisced wound  Pending cultures  Received zosyn x1  Will continue with vancomycin and cefazolin  General surgery consulted for possible debridement  PT/OT

## 2022-01-18 NOTE — CONSULTS
"  [facility] - Med Surg  Adult Nutrition  Consult Note    SUMMARY     Recommendations    Recommendation:  1. Add beneprotein tid to promote wound healing.   2. Encourage intake at meals as tolerated.   3. Monitor weight/labs.   4. RD to follow to monitor po intake    Goals:   Pt will tolerate diet with at least 50-75% intake at meals by RD follow up  Nutrition Goal Status: new  Communication of RD Recs: reviewed with RN    Assessment and Plan  * Infected wound  Contributing Nutrition Diagnosis  Increased energy/protein needs    Related to (etiology):   Wound healing    Signs and Symptoms (as evidenced by):   Infected amputation stump    Interventions:  Collaboration with other providers  Commercial beverage  Increased protein diet    Nutrition Diagnosis Status:   New    Malnutrition Assessment  Subcutaneous Fat Loss (Final Summary): well nourished  Muscle Loss Evaluation (Final Summary): well nourished       Reason for Assessment  Reason For Assessment: consult (wound)  Diagnosis:  (infected wound)  Relevant Medical History: GERD, HTN, hyperlipemia, DM, MI, PVD, CKD, heart cath, CABG, thrombectomy, L AKA, R BKA  General Information Comments: Pt on 2000 ADA diet with Boost Glucose Control. Pt reports fair intake at meals. Pt agreeable to beneprotein to promote wound healing. L AKA, R BKA. Carlos 18-R leg incision, coccyx dermatitis.  Nutrition Discharge Planning: pt to d/c on ADA diet    Nutrition Risk Screen  Nutrition Risk Screen: no indicators present    Nutrition/Diet History  Food Preferences: no Roman Catholic or cultural food prefs identified  Spiritual, Cultural Beliefs, Protestant Practices, Values that Affect Care: no  Factors Affecting Nutritional Intake: depression    Anthropometrics  Temp: 97.7 °F (36.5 °C)  Height Method: Stated  Height: 5' 5" (165.1 cm)  Height (inches): 65 in  Weight Method: Bed Scale  Weight: 76.2 kg (167 lb 15.9 oz)  Weight (lb): 167.99 lb  Ideal Body Weight (IBW), Female: 125 lb  % Ideal " Body Weight, Female (lb): 134.39 %  BMI (Calculated): 28  BMI Grade: 25 - 29.9 - overweight  Amputation %: 5.9,16  Total Amputation %: 21.9  Amputation Ideal Body Weight (IBW), Female (lb): 103.1 lb  Amputee BMI (kg/m2): 35.89 kg/m2     Lab/Procedures/Meds  Pertinent Labs Reviewed: reviewed  Pertinent Labs Comments: BUN 23H, Glu 176H, Ca 7.7L, Alb 1.6L  Pertinent Medications Reviewed: reviewed  Pertinent Medications Comments: Lasix, insulin, vancomycin    Estimated/Assessed Needs  Weight Used For Calorie Calculations: 76.2 kg (167 lb 15.9 oz)  Energy Calorie Requirements (kcal): 7181-3668 (25-30kcal/kg with amputation %)  Energy Need Method: Kcal/kg  Protein Requirements: 72g (1.2g/kg with amputation %)  Weight Used For Protein Calculations: 76.2 kg (167 lb 15.9 oz)  Estimated Fluid Requirement Method: RDA Method  RDA Method (mL): 1488     Nutrition Prescription Ordered  Current Diet Order: 2000 ADA  Oral Nutrition Supplement: Boost Glucose Control    Evaluation of Received Nutrient/Fluid Intake  I/O: 240/0  Energy Calories Required: meeting needs  Protein Required: meeting needs  Fluid Required: meeting needs  Comments: LBM 1/17  % Intake of Estimated Energy Needs: 50 - 75 %  % Meal Intake: 50 - 75 %    Nutrition Risk  Level of Risk/Frequency of Follow-up:  (2xweekly)     Monitor and Evaluation  Food and Nutrient Intake: food and beverage intake  Food and Nutrient Adminstration: diet order  Physical Activity and Function: nutrition-related ADLs and IADLs  Anthropometric Measurements: weight  Biochemical Data, Medical Tests and Procedures: electrolyte and renal panel  Nutrition-Focused Physical Findings: overall appearance     Nutrition Follow-Up  RD Follow-up?: Yes

## 2022-01-18 NOTE — NURSING
Pt arrived to unit from ED. Noted to have elevated BP at 210/111 and c/o pain 8/10. Call placed to Dr Savage with no answer. Team secure chatted, awaiting orders at this time. Handed off to night shift.

## 2022-01-18 NOTE — HPI
Suyapa Connelly is a 55 y/o female with PMHx CKD3, DMII, GERD, HLD, HTN, osteomyelitis, PVD, depression, and anxiety presents to ED with  with continue concern for wound infection.  Patient underwent right BKA by Dr. Rojas on 12/28 then discharged to rehab facility on 1/10.  Rehab facility staff became concerned for possible wound infection and patient was sent back to ED on 1/11.  Lab work was obtained, and images of wound were reviewed with Dr. Rojas, who recommended continued outpatient management.  At discharge patient was prescribed Augmentin, which she has continued taking as instructed.   at bedside is concern for progressively worsening appearance of wound, along with continued drainage.  Patient reporting worsened pain today described as sharp, worse with touch, radiating towards right knee, severity 8/10.  No fevers, chills, nausea, vomiting, chest pain, cough, shortness of breath, abdominal pain, urinary or bowel complications.  She does admit to not taking her BP medication today.  No other acute complaints at this time. Will admit to hospital medicine for observation services. General surgery consulted.    08-Jan-2021 12:00

## 2022-01-18 NOTE — PT/OT/SLP EVAL
Physical Therapy Evaluation    Patient Name:  Suyapa Connelly   MRN:  [id number]    Recommendations:     Discharge Recommendations:  rehabilitation facility   Discharge Equipment Recommendations: lift device   Barriers to Discharge: increased caregiver burden of care, requiring assist for mobility and ADLs    Assessment:     Suyapa Connelly is a 56 y.o. female admitted with a medical diagnosis of Infected wound.  She presents with the following impairments/functional limitations:  weakness,impaired endurance,impaired sensation,impaired self care skills,impaired functional mobilty,impaired balance,decreased lower extremity function,pain,decreased ROM,impaired skin,impaired muscle length. Pt completed supine<>sit with min A and scooted along EOB to the right with SBA, requiring increased time to complete. Recommending IPR upon d/c to continue to address pt's functional limitations and improve pt's independence, decreasing caregiver burden of care.    Rehab Prognosis: Good; patient would benefit from acute skilled PT services to address these deficits and reach maximum level of function.    Recent Surgery: * No surgery found *      Plan:     During this hospitalization, patient to be seen 5 x/week to address the identified rehab impairments via therapeutic activities,therapeutic exercises,neuromuscular re-education,wheelchair management/training and progress toward the following goals:    · Plan of Care Expires:  02/18/22    Subjective     Chief Complaint: pain  Patient/Family Comments/goals: pt is tearful at start of session regarding her current mobility status. Pt is grateful to PT/OT and is very pleasant, understanding of goals of therapy to improve her safety and independence as much as possible.   Pain/Comfort:  · Pain Rating 1: 10/10  · Location 1:  (phantom painin BLEs)  · Pain Rating Post-Intervention 1: 0/10  · Pain Rating 2:  (not rated)  · Location - Side 2: Right  · Location 2:  (incision at R  BKA)  · Pain Addressed 2: Reposition,Distraction,Cessation of Activity,Nurse notified  · Pain Rating Post-Intervention 2: 6/10    Patients cultural, spiritual, Oriental orthodox conflicts given the current situation: no    Living Environment:  Pt lives with her  in a H with ramp entrance and tub/shower combo.  Prior to admission, patients level of function was - pt with recent admission in Dec and with R BKA on 12/28 - d/c to IPR on 1/10 and was sent back to ED on 1/11 for concern of R wound infection. Pt reporting she was in the ED for one day then was d/c home. Her  and security assisted her with transfer to w/c then to car and her  transferred her to w/c and bed at home where pt reports she has been for the last several days, unable to tolerate mobility 2/2 significant RLE pain.  Equipment used at home: hospital bed,wheelchair.  DME owned (not currently used): none.  Upon discharge, patient will have assistance from her .    Objective:     Communicated with nurse Lyles prior to session.  Patient found right sidelying with bed alarm,peripheral IV  upon PT entry to room.    General Precautions: Standard, fall   Orthopedic Precautions:N/A   Braces: N/A  Respiratory Status: Room air    Exams:  · Gross Motor Coordination:  WFL  · Postural Exam:  Patient presented with the following abnormalities:    · -       Rounded shoulders  · Sensation:    · -       Impaired  light/touch to RLE - reporting no snesation to RLE since her L AKA in June 2021  · Skin Integrity/Edema:      · -       Skin integrity: R knee BKA incision with dressing in place  · RLE ROM: Deficits: R BKA - no noted R knee ext, trace knee flexion, trace hip flexion  · RLE Strength: knee 0/5, hip 1/5  · LLE ROM: AKA  · LLE Strength: at least 3+/5    Functional Mobility:  · Bed Mobility:     · Rolling Left:  minimum assistance  · Rolling Right: stand by assistance and use of bed rail  · Scooting: stand by assistance and increased  "time to scoot right while sitting EOB  · Supine to Sit: minimum assistance  · Sit to Supine: minimum assistance    Therapeutic Activities and Exercises:  Educated pt on role of PT and pt agreeable to participate in therapy session.  Pt initially tearful when PT/OT entered as she was thinking about her current mobility status and "trying to think of goals for my future."  Spoke with pt extensively on importance of speaking about emotions and on mobility goals that can be worked towards and pt became less upset and was very grateful to therapists.  Educated on beasy board and its use/purpose and pt seemed interested in trying it on a later date. Pt reporting she has tried a sliding board in the past but did not feel comfortable.  Transitioned to sit EOB then pt able to scoot herself to position on EOB with additional time/effort required for scooting.  VCs for use of exhale to assist with mobility.  Completed strength assessment then scooted right towards HOB.  Required several minutes to complete scooting then returned to supine.  Able to raise LLE to place it on pillow, unable to raise RLE.  Educated pt on importance of lying in supine and bed mobility including rolling R/L, repositioning, and scooting in bed for strengthening and for protection of skin.     AM-PAC 6 CLICK MOBILITY  Total Score:10     Patient left HOB elevated with all lines intact, call button in reach and bed alarm on.    GOALS:   Multidisciplinary Problems     Physical Therapy Goals        Problem: Physical Therapy Goal    Goal Priority Disciplines Outcome Goal Variances Interventions   Physical Therapy Goal     PT, PT/OT Ongoing, Progressing     Description: Goals to be met by: 22     Patient will increase functional independence with mobility by performin. Supine <> sit with Stand-by Assistance  2. Bed to chair transfer with Minimal Assistance using scoot transfer or slideboard/beasy board  3. Wheelchair propulsion x 50 feet with " Stand-by Assistance using bilateral upper extremities                     History:     Past Medical History:   Diagnosis Date    Anxiety     Chronic pain syndrome     CKD (chronic kidney disease), stage III     Depression     Diabetes mellitus     type 2    Diabetes mellitus, type 2     GERD (gastroesophageal reflux disease)     Hyperemesis 3/23/2021    Hyperlipemia     Hypertension     Hypokalemia 3/23/2021    Myocardial infarction 2010    minor-caused by stress per pt.    Osteomyelitis     Osteomyelitis of left foot 4/30/2021    PVD (peripheral vascular disease)     Vaginal delivery     x1       Past Surgical History:   Procedure Laterality Date    ABOVE-KNEE AMPUTATION Left 5/18/2021    Procedure: AMPUTATION, ABOVE KNEE;  Surgeon: Teddy Huber MD;  Location: [facility] OR [address] [place] [address];  Service: Vascular;  Laterality: Left;    Angiogram - Right Extremity Right 7/9/15    angiogram-left leg  10/6/15    ANGIOGRAPHY OF LOWER EXTREMITY Left 4/29/2021    Procedure: ANGIOGRAM, LOWER EXTREMITY;  Surgeon: Teddy Huber MD;  Location: [facility] OR [address] [place] [address];  Service: Vascular;  Laterality: Left;    BELOW KNEE AMPUTATION OF LOWER EXTREMITY Right 12/28/2021    Procedure: AMPUTATION, BELOW KNEE;  Surgeon: Kaitlyn Rojas MD;  Location: [facility];  Service: General;  Laterality: Right;    CATHETERIZATION OF BOTH LEFT AND RIGHT HEART N/A 12/18/2019    Procedure: CATHETERIZATION, HEART, BOTH LEFT AND RIGHT;  Surgeon: Que Fernando III, MD;  Location: [facility] CATH LAB;  Service: Cardiology;  Laterality: N/A;    CORONARY ANGIOGRAPHY N/A 12/18/2019    Procedure: ANGIOGRAM, CORONARY ARTERY;  Surgeon: Que Fernando III, MD;  Location: [facility] CATH LAB;  Service: Cardiology;  Laterality: N/A;    CORONARY ANGIOGRAPHY INCLUDING BYPASS GRAFTS WITH CATHETERIZATION OF LEFT HEART N/A 7/28/2020    Procedure: ANGIOGRAM, CORONARY, INCLUDING BYPASS GRAFT, WITH LEFT HEART CATHETERIZATION, 9 am;  Surgeon: Rachel VELIZ  MD Tracee;  Location: [facility] CATH LAB;  Service: Cardiology;  Laterality: N/A;    CORONARY ARTERY BYPASS GRAFT (CABG) N/A 1/14/2020    Procedure: CORONARY ARTERY BYPASS GRAFT (CABG) x 1     Off Pump;  Surgeon: Huang Altamirano MD;  Location: [facility] OR [address] [place] [address];  Service: Cardiovascular;  Laterality: N/A;    CREATION OF FEMORAL-TIBIAL ARTERY BYPASS Left 4/29/2021    Procedure: CREATION, BYPASS, ARTERIAL, FEMORAL TO ANTERIOR TIBIAL;  Surgeon: Teddy Huber MD;  [facility];  Service: Vascular;  Laterality: Left;    CREATION OF FEMOROPOPLITEAL ARTERIAL BYPASS USING GRAFT Left 8/18/2020    Procedure: CREATION, BYPASS, ARTERIAL, FEMORAL TO POPLITEAL, USING GRAFT, LEFT LOWER EXTREMITY;  Surgeon: Teddy Huber MD;  Location: [facility] OR;  Service: Vascular;  Laterality: Left;  REQUEST 7:15 A.M. START----COVID NEGATIVE ON 8/17 1ST CASE JASWANT PER LEANA ON 8/7/2020 @ 942AM-  RN PREOP 8/12/2020   T/S-----CLEARED BY CARDS-------PENDING INSURANCE    DEBRIDEMENT OF FOOT Left 9/8/2020    Procedure: DEBRIDEMENT, FOOT;  Surgeon: Rosio Mayes DPM;  Location: [facility] OR;  Service: Podiatry;  Laterality: Left;  request neoxx .   RN Pre Op 9-4-2020, Covid negative on 9/5/20. C A    DEBRIDEMENT OF FOOT  3/4/2021    Procedure: DEBRIDEMENT, FOOT;  Surgeon: Teddy Huber MD;  Location: [facility] OR;  Service: Vascular;;    DEBRIDEMENT OF FOOT Left 3/9/2021    Procedure: DEBRIDEMENT, FOOT, bone biopsy;  Surgeon: Rosio Mayes DPM;  Location: [facility] OR;  Service: Podiatry;  Laterality: Left;  Request neoxx---COVID IN AM  REQUESTING NOON START  RN Phone Pre op.On Blood thinners Plavix and Eliquis.  Covid am of surgery. C A    DEBRIDEMENT OF FOOT Left 5/4/2021    Procedure: DEBRIDEMENT, FOOT;  Surgeon: Farooq Morley DPM;  [facility];  Service: Podiatry;  Laterality: Left;    INSERTION OF TUNNELED CENTRAL VENOUS HEMODIALYSIS CATHETER N/A 1/27/2020    Procedure: Insertion, Catheter,  Central Venous, Hemodialysis;  Surgeon: ESTEBAN Gomez III, MD;  Location: [facility] CATH LAB;  Service: Peripheral Vascular;  Laterality: N/A;    PERCUTANEOUS TRANSLUMINAL ANGIOPLASTY N/A 3/4/2021    Procedure: PTA (ANGIOPLASTY, PERCUTANEOUS, TRANSLUMINAL);  Surgeon: Teddy Huber MD;  Location: [facility] OR;  Service: Vascular;  Laterality: N/A;    REMOVAL OF ARTERIOVENOUS GRAFT Left 5/27/2021    Procedure: REMOVAL, GRAFT, LEFT LOWER EXTREMITY, WOUND EXPLORATION;  Surgeon: Teddy Huber MD;  Location: [facility] OR 2ND FLR;  Service: Vascular;  Laterality: Left;    REMOVAL OF NAIL OF DIGIT Left 3/9/2021    Procedure: REMOVAL, NAIL, DIGIT;  Surgeon: Rosio Mayes DPM;  Location: [facility] OR;  Service: Podiatry;  Laterality: Left;    THROMBECTOMY Left 3/4/2021    Procedure: THROMBECTOMY, LEFT LOWER EXTREMITY BYPASS GRAFT, ANGIOGRAM, POSSIBLE INTERVENTION, POSSIBLE LEFT LOWER EXTREMITY BYPASS;  Surgeon: Teddy Huber MD;  Location: [facility] OR;  Service: Vascular;  Laterality: Left;    THROMBECTOMY Left 4/29/2021    Procedure: GRAFT THROMBECTOMY, LEFT LOWER EXTREMITY;  Surgeon: Teddy Huber MD;  Location: [facility] OR 2ND FLR;  Service: Vascular;  Laterality: Left;  14.5 min  1179.85 mGy  341.01 Gycm2  240 ml dye    THROMBECTOMY  10/22/2021    Procedure: THROMBECTOMY;  Surgeon: Saad Arenas MD;  Location: [facility] CATH LAB/EP;  Service: Cardiology;;       Time Tracking:     PT Received On: 01/18/22  PT Start Time: 1119     PT Stop Time: 1152  PT Total Time (min): 33 min with OT    Billable Minutes: Evaluation 10 and Therapeutic Activity 10      01/18/2022

## 2022-01-18 NOTE — PLAN OF CARE
Pt is AAOx4. Pt received all medications per MAR. Pt blood glucose monitored. Pt vitals closely monitored due to elevated blood pressure. Patient wounds assessed. Vitals are stable and safety maintained. Will continue to monitor.

## 2022-01-18 NOTE — ED NOTES
Pt in room 6 with her . Pt has a healed left above the knee amputation and had a right below the knee amputation last month. Pt's wound has not healed appropriately and was showing sings of infection at rehab. She was sent to ER last week and was sent home with antibiotics. Pt returns today with hopes of being admitted for IV antibiotics so she can return to rehab to continue her treatment. Pt aa0x4 and has a midline to the right upper arm. Dressing is clean, dry and intact and it flushes easily. Head to toe assessment completed, plan of care reviewed, call bell within reach.

## 2022-01-18 NOTE — PLAN OF CARE
OT reyna performed, report to follow    IPR at discharge    Pt will need amputee axle plates for WC    May benefit from slide board and drop arm BSC      Problem: Occupational Therapy Goal  Goal: Occupational Therapy Goal  Description: Goals to be met by: 2/18     Patient will increase functional independence with ADLs by performing:    UE Dressing with Stand-by Assistance.  LE Dressing with Minimal Assistance.  Grooming while seated with Modified [place].  Toilet transfer to bedside commode with Moderate Assistance.  Upper extremity exercise program x10 reps per handout, with independence.    Outcome: Ongoing, Progressing

## 2022-01-18 NOTE — PLAN OF CARE
Recommendation:  1. Add beneprotein tid to promote wound healing.   2. Encourage intake at meals as tolerated.   3. Monitor weight/labs.   4. RD to follow to monitor po intake    Goals:   Pt will tolerate diet with at least 50-75% intake at meals by RD follow up  Nutrition Goal Status: new

## 2022-01-18 NOTE — CONSULTS
[facility] Surg  Wound Care    Patient Name:  Suyapa Connelly   MRN:  [id number]  Date: 1/18/2022  Diagnosis: Infected wound    History:     Past Medical History:   Diagnosis Date    Anxiety     Chronic pain syndrome     CKD (chronic kidney disease), stage III     Depression     Diabetes mellitus     type 2    Diabetes mellitus, type 2     GERD (gastroesophageal reflux disease)     Hyperemesis 3/23/2021    Hyperlipemia     Hypertension     Hypokalemia 3/23/2021    Myocardial infarction 2010    minor-caused by stress per pt.   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      Social Determinants of Health     Financial Resource Strain: High Risk    Difficulty of Paying Living Expenses: Hard   Food Insecurity: No Food Insecurity    Worried About Running Out of Food in the Last Year: Never true    Ran Out of Food in the Last Year: Never true   Transportation Needs: No Transportation Needs    Lack of Transportation (Medical): No    Lack of Transportation (Non-Medical): No   Stress: Stress Concern Present    Feeling of Stress : Rather much   Housing Stability: Low Risk     Unable to Pay for Housing in the Last Year: No    Number of Places Lived in the Last Year: 1    Unstable Housing in the Last Year: No       Precautions:     Allergies as of 01/17/2022 - Reviewed 01/17/2022   Allergen Reaction Noted    Ciprofloxacin Itching 07/18/2020    Contrast media  01/10/2020    Iodine  01/10/2020       WOC  Assessment Details/Treatment     Dr. Rojas managing R BKA with wound orders in place.  See assessment below of buttocks.     L upper buttocks- unstageable pressure injury- present on admit- full thickness- slough- pink edges- scant amount of serosanguinous drainage- no odor      Recommendations discussed with pt, nurse and MD team:  - Maintain pressure injury prevention interventions to include waffle overlay  - Triad ointment to L buttocks BID and prn incontinent episode    01/18/2022

## 2022-01-18 NOTE — PLAN OF CARE
Problem: Adult Inpatient Plan of Care  Goal: Plan of Care Review  Outcome: Ongoing, Progressing   Pt AAOX4. Safety precautions maintained. Bed low and locked, call light within reach. Medications administered per MAR. Pt tolerating diet well. SS insulin given per orders. Contact precautions initiated. Wound care per orders. DVT prophylaxis continued. Hourly rounding performed. Encouraged to call for OOB assistance. Awaiting pt disposition. Pt updated on plan of care and verbalizes understanding.

## 2022-01-18 NOTE — PT/OT/SLP EVAL
Occupational Therapy   Evaluation    Name: Suyapa Connelly  MRN: [id number]  Admitting Diagnosis:  Infected wound  Recent Surgery: * No surgery found *      Recommendations:     Discharge Recommendations: rehabilitation facility  Discharge Equipment Recommendations:  slide board,other (see comments),bedside commode (amputee axle plates for wc, drop arm BSC)  Barriers to discharge:  None    Assessment:     Suyapa Connelly is a 56 y.o. female with a medical diagnosis of Infected wound.  She presents with performance deficits affecting function: weakness,gait instability,decreased upper extremity function,decreased ROM,impaired endurance,impaired balance,decreased lower extremity function,impaired sensation,pain,impaired self care skills,impaired functional mobilty,impaired cognition,impaired skin.      Rehab Prognosis: Good; patient would benefit from acute skilled OT services to address these deficits and reach maximum level of function.       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  Patient found HOB elevated with bed alarm,peripheral IV upon OT entry to room.    General Precautions: Standard, fall,diabetic   Orthopedic Precautions:    Braces:    Respiratory Status: Room air    Occupational Performance:    Bed Mobility:    · Patient completed Rolling/Turning to Left with  stand by assistance  · Patient completed Rolling/Turning to Right with stand by assistance  · Patient completed Scooting/Bridging with stand by assistance  · Patient completed Supine to Sit with stand by assistance  · Patient completed Sit to Supine with stand by assistance    Functional Mobility/Transfers:  · NT  · Functional Mobility: NT    Activities of Daily Living:  · NT    Cognitive/Visual Perceptual:  AO4    Physical Exam:  BUE AROM/strength WFL  BUE sensation grossly intact, BLE impaired  Fair+/Good sitting balance    AMPAC 6 Click ADL:  AMPAC Total Score: 17    Treatment & Education:  Pt educated on role of OT/POC< WC modifications, bed level positioning and ex to perform, DME recs of SB or Beasy Board, drop arm BSC, Bidet  Education:  Pt able to perform forward and lateral scooting with increased time and rest breaks. Education provided on wt shfit for scooting    Patient left HOB elevated with all lines intact, call button in reach, bed alarm on and nurse notified    GOALS:   Multidisciplinary Problems     Occupational Therapy Goals        Problem: Occupational Therapy Goal    Goal Priority Disciplines Outcome Interventions   Occupational Therapy Goal     OT, PT/OT Ongoing, Progressing    Description: Goals to be met by: 2/18     Patient will increase functional independence with ADLs by performing:    UE Dressing with Stand-by Assistance.  LE Dressing with Minimal Assistance.  Grooming while seated with Modified [place].  Toilet transfer to bedside commode with Moderate Assistance.  Upper extremity exercise program x10 reps per handout, with independence.                     History:     Past Medical  History:   Diagnosis Date    Anxiety     Chronic pain syndrome     CKD (chronic kidney disease), stage III     Depression     Diabetes mellitus     type 2    Diabetes mellitus, type 2     GERD (gastroesophageal reflux disease)     Hyperemesis 3/23/2021    Hyperlipemia     Hypertension     Hypokalemia 3/23/2021    Myocardial infarction 2010    minor-caused by stress per pt.    Osteomyelitis     Osteomyelitis of left foot 4/30/2021    PVD (peripheral vascular disease)     Vaginal delivery     x1       Past Surgical History:   Procedure Laterality Date    ABOVE-KNEE AMPUTATION Left 5/18/2021    Procedure: AMPUTATION, ABOVE KNEE;  Surgeon: Teddy Huber MD;  Location: [facility] OR [address] [place] [address];  Service: Vascular;  Laterality: Left;    Angiogram - Right Extremity Right 7/9/15    angiogram-left leg  10/6/15    ANGIOGRAPHY OF LOWER EXTREMITY Left 4/29/2021    Procedure: ANGIOGRAM, LOWER EXTREMITY;  Surgeon: Teddy Huber MD;  Location: [facility] OR [address] [place] [address];  Service: Vascular;  Laterality: Left;    BELOW KNEE AMPUTATION OF LOWER EXTREMITY Right 12/28/2021    Procedure: AMPUTATION, BELOW KNEE;  Surgeon: Kaitlyn Rojas MD;  Location: [facility];  Service: General;  Laterality: Right;    CATHETERIZATION OF BOTH LEFT AND RIGHT HEART N/A 12/18/2019    Procedure: CATHETERIZATION, HEART, BOTH LEFT AND RIGHT;  Surgeon: Que Fernando III, MD;  Location: [facility] CATH LAB;  Service: Cardiology;  Laterality: N/A;    CORONARY ANGIOGRAPHY N/A 12/18/2019    Procedure: ANGIOGRAM, CORONARY ARTERY;  Surgeon: Que Fernando III, MD;  Location: [facility] CATH LAB;  Service: Cardiology;  Laterality: N/A;    CORONARY ANGIOGRAPHY INCLUDING BYPASS GRAFTS WITH CATHETERIZATION OF LEFT HEART N/A 7/28/2020    Procedure: ANGIOGRAM, CORONARY, INCLUDING BYPASS GRAFT, WITH LEFT HEART CATHETERIZATION, 9 am;  Surgeon: Rachel Easley MD;  Location: [facility] CATH LAB;  Service: Cardiology;  Laterality: N/A;    CORONARY ARTERY BYPASS  GRAFT (CABG) N/A 1/14/2020    Procedure: CORONARY ARTERY BYPASS GRAFT (CABG) x 1     Off Pump;  Surgeon: Huang Altamirano MD;  Location: [facility] OR [address] [place] [address];  Service: Cardiovascular;  Laterality: N/A;    CREATION OF FEMORAL-TIBIAL ARTERY BYPASS Left 4/29/2021    Procedure: CREATION, BYPASS, ARTERIAL, FEMORAL TO ANTERIOR TIBIAL;  Surgeon: Teddy Huber MD;  [facility];  Service: Vascular;  Laterality: Left;    CREATION OF FEMOROPOPLITEAL ARTERIAL BYPASS USING GRAFT Left 8/18/2020    Procedure: CREATION, BYPASS, ARTERIAL, FEMORAL TO POPLITEAL, USING GRAFT, LEFT LOWER EXTREMITY;  Surgeon: Teddy Huber MD;  Location: [facility] OR;  Service: Vascular;  Laterality: Left;  REQUEST 7:15 A.M. START----COVID NEGATIVE ON 8/17 1ST CASE STARTE PER LEANA ON 8/7/2020 @ 942AM-  RN PREOP 8/12/2020   T/S-----CLEARED BY CARDS-------PENDING INSURANCE    DEBRIDEMENT OF FOOT Left 9/8/2020    Procedure: DEBRIDEMENT, FOOT;  Surgeon: Rosio Mayes DPM;  Location: [facility] OR;  Service: Podiatry;  Laterality: Left;  request neoxx .   RN Pre Op 9-4-2020, Covid negative on 9/5/20. C A    DEBRIDEMENT OF FOOT  3/4/2021    Procedure: DEBRIDEMENT, FOOT;  Surgeon: Teddy Huber MD;  Location: [facility] OR;  Service: Vascular;;    DEBRIDEMENT OF FOOT Left 3/9/2021    Procedure: DEBRIDEMENT, FOOT, bone biopsy;  Surgeon: Rosio Mayes DPM;  Location: [facility] OR;  Service: Podiatry;  Laterality: Left;  Request neoxx---COVID IN AM  REQUESTING NOON START  RN Phone Pre op.On Blood thinners Plavix and Eliquis.  Covid am of surgery. C A    DEBRIDEMENT OF FOOT Left 5/4/2021    Procedure: DEBRIDEMENT, FOOT;  Surgeon: Farooq Morley DPM;  [facility];  Service: Podiatry;  Laterality: Left;    INSERTION OF TUNNELED CENTRAL VENOUS HEMODIALYSIS CATHETER N/A 1/27/2020    Procedure: Insertion, Catheter, Central Venous, Hemodialysis;  Surgeon: ESTEBAN Gomez III, MD;  Location: [facility] CATH LAB;  Service:  Peripheral Vascular;  Laterality: N/A;    PERCUTANEOUS TRANSLUMINAL ANGIOPLASTY N/A 3/4/2021    Procedure: PTA (ANGIOPLASTY, PERCUTANEOUS, TRANSLUMINAL);  Surgeon: Teddy Huber MD;  Location: [facility] OR;  Service: Vascular;  Laterality: N/A;    REMOVAL OF ARTERIOVENOUS GRAFT Left 5/27/2021    Procedure: REMOVAL, GRAFT, LEFT LOWER EXTREMITY, WOUND EXPLORATION;  Surgeon: Teddy Huber MD;  Location: [facility] OR [facility] FLR;  Service: Vascular;  Laterality: Left;    REMOVAL OF NAIL OF DIGIT Left 3/9/2021    Procedure: REMOVAL, NAIL, DIGIT;  Surgeon: Rosio Mayes DPM;  Location: [facility] OR;  Service: Podiatry;  Laterality: Left;    THROMBECTOMY Left 3/4/2021    Procedure: THROMBECTOMY, LEFT LOWER EXTREMITY BYPASS GRAFT, ANGIOGRAM, POSSIBLE INTERVENTION, POSSIBLE LEFT LOWER EXTREMITY BYPASS;  Surgeon: Teddy Huber MD;  Location: [facility] OR;  Service: Vascular;  Laterality: Left;    THROMBECTOMY Left 4/29/2021    Procedure: GRAFT THROMBECTOMY, LEFT LOWER EXTREMITY;  Surgeon: Teddy Huber MD;  Location: [facility] OR 2ND FLR;  Service: Vascular;  Laterality: Left;  14.5 min  1179.85 mGy  341.01 Gycm2  240 ml dye    THROMBECTOMY  10/22/2021    Procedure: THROMBECTOMY;  Surgeon: Saad Arenas MD;  Location: [facility] CATH LAB/EP;  Service: Cardiology;;       Time Tracking:     OT Date of Treatment: 01/18/22  OT Start Time: 1117  OT Stop Time: 1155  OT Total Time (min): 38 min    Billable Minutes:Evaluation 15  Therapeutic Activity 11    1/18/2022

## 2022-01-18 NOTE — PROGRESS NOTES
Pharmacokinetic Initial Assessment: IV Vancomycin    Assessment/Plan:    Initiate intravenous vancomycin with loading dose of 1750 mg once followed by a maintenance dose of vancomycin 1500mg IV every 24 hours  Desired empiric serum trough concentration is 10 to 15 mcg/mL  Draw vancomycin trough level 60 min prior to third dose on 1-9 at approximately 20:00  Pharmacy will continue to follow and monitor vancomycin.      Please contact pharmacy at extension [phone] with any questions regarding this assessment.     Thank you for the consult,   Ovidio FORD Shanna       Patient brief summary:  Suyapa Connelly is a 56 y.o. female initiated on antimicrobial therapy with IV Vancomycin for treatment of suspected skin & soft tissue infection    Drug Allergies:   Review of patient's allergies indicates:   Allergen Reactions    Ciprofloxacin Itching    Contrast media      Kidney injury    Iodine      Kidney injury       Actual Body Weight:   70.7 kg    Renal Function:   Estimated Creatinine Clearance: 52.3 mL/min (based on SCr of 1.2 mg/dL).,     Dialysis Method (if applicable):  N/A    CBC (last 72 hours):  Recent Labs   Lab Result Units 01/17/22  1547   WBC K/uL 10.66   Hemoglobin g/dL 8.7*   Hematocrit % 27.4*   Platelets K/uL 404   Gran % % 77.0*   Lymph % % 13.4*   Mono % % 6.8   Eosinophil % % 1.8   Basophil % % 0.5   Differential Method  Automated       Metabolic Panel (last 72 hours):  Recent Labs   Lab Result Units 01/17/22  1547   Sodium mmol/L 141   Potassium mmol/L 4.0   Chloride mmol/L 107   CO2 mmol/L 20*   Glucose mg/dL 250*   BUN mg/dL 24*   Creatinine mg/dL 1.2   Albumin g/dL 1.7*   Total Bilirubin mg/dL 0.2   Alkaline Phosphatase U/L 97   AST U/L 20   ALT U/L 17       Drug levels (last 3 results):  No results for input(s): VANCOMYCINRA, VANCOMYCINPE, VANCOMYCINTR in the last 72 hours.    Microbiologic Results:  Microbiology Results (last 7 days)       Procedure Component Value Units Date/Time    Aerobic culture  (Specify Source) **CANNOT BE ORDERED AS STAT** [[id number]] Collected: 01/17/22 1627    Order Status: Sent Specimen: Wound from Knee, Right Updated: 01/17/22 1919    Blood culture #1 **CANNOT BE ORDERED STAT** [[id number]] Collected: 01/17/22 1548    Order Status: Sent Specimen: Blood from Peripheral, Hand, Right Updated: 01/17/22 1854    Blood culture #2 **CANNOT BE ORDERED STAT** [[id number]] Collected: 01/17/22 1548    Order Status: Sent Specimen: Blood from Peripheral, Hand, Right Updated: 01/17/22 1854

## 2022-01-18 NOTE — ASSESSMENT & PLAN NOTE
Hemoglobin A1C   Date Value Ref Range Status   12/03/2021 9.6 (H) 4.0 - 5.6 % Final     Comment:     ADA Screening Guidelines:  5.7-6.4%  Consistent with prediabetes  >or=6.5%  Consistent with diabetes    High levels of fetal hemoglobin interfere with the HbA1C  assay. Heterozygous hemoglobin variants (HbS, HgC, etc)do  not significantly interfere with this assay.   However, presence of multiple variants may affect accuracy.                                       Patient's FSGs are uncontrolled due to hyperglycemia on current medication regimen.  - home regimen includes detemir, aspart  - hold home oral medications  - increased scheduled doses  - LDSSI with ACCUcheck ACHS  - Diabetic diet  -Hypoglycemia protocol PRN

## 2022-01-18 NOTE — H&P
[facility] Medicine  History & Physical    Patient Name: Suyapa Connelly  MRN: [id number]  Patient Class: OP- Observation  Admission Date: 1/17/2022  Attending Physician: Tawnya Montes*   Primary Care Provider: Magen Christensen MD         Patient information was obtained from patient and ER records.     Subjective:     Principal Problem:Infected wound    Chief Complaint:   Chief Complaint   Patient presents with    Post-op Problem     Pt was recently seen in ED and admitted for amputation of below the knee on December 28th, 2021. Pt reports she came back in on Thursday sent home ABX and pain medication. Pt reports in creased pain and feels she was sent home too early.         HPI: Suyapa Connelly is a 57 y/o female with PMHx CKD3, DMII, GERD, HLD, HTN, osteomyelitis, PVD, depression, and anxiety presents to ED with  with continue concern for wound infection.  Patient underwent right BKA by Dr. Rojas on 12/28 then discharged to rehab facility on 1/10.  Rehab facility staff became concerned for possible wound infection and patient was sent back to ED on 1/11.  Lab work was obtained, and images of wound were reviewed with Dr. Rojas, who recommended continued outpatient management.  At discharge patient was prescribed Augmentin, which she has continued taking as instructed.   at bedside is concern for progressively worsening appearance of wound, along with continued drainage.  Patient reporting worsened pain today described as sharp, worse with touch, radiating towards right knee, severity 8/10.  No fevers, chills, nausea, vomiting, chest pain, cough, shortness of breath, abdominal pain, urinary or bowel complications.  She does admit to not taking her BP medication today.  No other acute complaints at this time. Will admit to hospital medicine for observation services. General surgery consulted.       Past Medical History:   Diagnosis Date    Anxiety     Chronic pain  syndrome     CKD (chronic kidney disease), stage III     Depression     Diabetes mellitus     type 2    Diabetes mellitus, type 2     GERD (gastroesophageal reflux disease)     Hyperemesis 3/23/2021    Hyperlipemia     Hypertension     Hypokalemia 3/23/2021    Myocardial infarction 2010    minor-caused by stress per pt.    Osteomyelitis     Osteomyelitis of left foot 4/30/2021    PVD (peripheral vascular disease)     Vaginal delivery     x1       Past Surgical History:   Procedure Laterality Date    ABOVE-KNEE AMPUTATION Left 5/18/2021    Procedure: AMPUTATION, ABOVE KNEE;  Surgeon: Teddy Huber MD;  Location: [facility] OR [address] [place] [address];  Service: Vascular;  Laterality: Left;    Angiogram - Right Extremity Right 7/9/15    angiogram-left leg  10/6/15    ANGIOGRAPHY OF LOWER EXTREMITY Left 4/29/2021    Procedure: ANGIOGRAM, LOWER EXTREMITY;  Surgeon: Teddy Huber MD;  Location: [facility] OR [address] [place] [address];  Service: Vascular;  Laterality: Left;    BELOW KNEE AMPUTATION OF LOWER EXTREMITY Right 12/28/2021    Procedure: AMPUTATION, BELOW KNEE;  Surgeon: Kaitlyn Rojas MD;  Location: [facility] OR;  Service: General;  Laterality: Right;    CATHETERIZATION OF BOTH LEFT AND RIGHT HEART N/A 12/18/2019    Procedure: CATHETERIZATION, HEART, BOTH LEFT AND RIGHT;  Surgeon: Que Fernando III, MD;  Location: [facility] CATH LAB;  Service: Cardiology;  Laterality: N/A;    CORONARY ANGIOGRAPHY N/A 12/18/2019    Procedure: ANGIOGRAM, CORONARY ARTERY;  Surgeon: Que Fernando III, MD;  Location: [facility] CATH LAB;  Service: Cardiology;  Laterality: N/A;    CORONARY ANGIOGRAPHY INCLUDING BYPASS GRAFTS WITH CATHETERIZATION OF LEFT HEART N/A 7/28/2020    Procedure: ANGIOGRAM, CORONARY, INCLUDING BYPASS GRAFT, WITH LEFT HEART CATHETERIZATION, 9 am;  Surgeon: Rachel Easley MD;  Location: [facility] CATH LAB;  Service: Cardiology;  Laterality: N/A;    CORONARY ARTERY BYPASS GRAFT (CABG) N/A 1/14/2020    Procedure: CORONARY  ARTERY BYPASS GRAFT (CABG) x 1     Off Pump;  Surgeon: Huang Altamirano MD;  Location: [address];  Service: Cardiovascular;  Laterality: N/A;    CREATION OF FEMORAL-TIBIAL ARTERY BYPASS Left 4/29/2021    Procedure: CREATION, BYPASS, ARTERIAL, FEMORAL TO ANTERIOR TIBIAL;  Surgeon: Teddy Huber MD;  Location: [address];  Service: Vascular;  Laterality: Left;    CREATION OF FEMOROPOPLITEAL ARTERIAL BYPASS USING GRAFT Left 8/18/2020    Procedure: CREATION, BYPASS, ARTERIAL, FEMORAL TO POPLITEAL, USING GRAFT, LEFT LOWER EXTREMITY;  Surgeon: Teddy Huber MD;  Location: [facility];  Service: Vascular;  Laterality: Left;  REQUEST 7:15 A.M. START----COVID NEGATIVE ON 8/17 1ST CASE STARTE PER LEANA ON 8/7/2020 @ 942AM-  RN PREOP 8/12/2020   T/S-----CLEARED BY CARDS-------PENDING INSURANCE    DEBRIDEMENT OF FOOT Left 9/8/2020    Procedure: DEBRIDEMENT, FOOT;  Surgeon: Rosio Mayes DPM;  Location: [facility];  Service: Podiatry;  Laterality: Left;  request neoxx .   RN Pre Op 9-4-2020, Covid negative on 9/5/20. C A    DEBRIDEMENT OF FOOT  3/4/2021    Procedure: DEBRIDEMENT, FOOT;  Surgeon: Teddy Huber MD;  Location: [facility];  Service: Vascular;;    DEBRIDEMENT OF FOOT Left 3/9/2021    Procedure: DEBRIDEMENT, FOOT, bone biopsy;  Surgeon: Rosio Mayes DPM;  Location: [facility] OR;  Service: Podiatry;  Laterality: Left;  Request neoxx---COVID IN AM  REQUESTING NOON START  RN Phone Pre op.On Blood thinners Plavix and Eliquis.  Covid am of surgery. C A    DEBRIDEMENT OF FOOT Left 5/4/2021    Procedure: DEBRIDEMENT, FOOT;  Surgeon: Farooq Morley DPM;  Location: [address];  Service: Podiatry;  Laterality: Left;    INSERTION OF TUNNELED CENTRAL VENOUS HEMODIALYSIS CATHETER N/A 1/27/2020    Procedure: Insertion, Catheter, Central Venous, Hemodialysis;  Surgeon: ESTEBAN Gomez III, MD;  Location: [facility] CATH LAB;  Service: Peripheral Vascular;  Laterality: N/A;    PERCUTANEOUS  TRANSLUMINAL ANGIOPLASTY N/A 3/4/2021    Procedure: PTA (ANGIOPLASTY, PERCUTANEOUS, TRANSLUMINAL);  Surgeon: Teddy Huber MD;  Location: [facility] OR;  Service: Vascular;  Laterality: N/A;    REMOVAL OF ARTERIOVENOUS GRAFT Left 5/27/2021    Procedure: REMOVAL, GRAFT, LEFT LOWER EXTREMITY, WOUND EXPLORATION;  Surgeon: Teddy Huber MD;  Location: [facility] OR 2ND FLR;  Service: Vascular;  Laterality: Left;    REMOVAL OF NAIL OF DIGIT Left 3/9/2021    Procedure: REMOVAL, NAIL, DIGIT;  Surgeon: Rosio Mayes DPM;  Location: [facility] OR;  Service: Podiatry;  Laterality: Left;    THROMBECTOMY Left 3/4/2021    Procedure: THROMBECTOMY, LEFT LOWER EXTREMITY BYPASS GRAFT, ANGIOGRAM, POSSIBLE INTERVENTION, POSSIBLE LEFT LOWER EXTREMITY BYPASS;  Surgeon: Teddy Huber MD;  Location: [facility] OR;  Service: Vascular;  Laterality: Left;    THROMBECTOMY Left 4/29/2021    Procedure: GRAFT THROMBECTOMY, LEFT LOWER EXTREMITY;  Surgeon: Teddy Huber MD;  Location: [facility] OR 2ND FLR;  Service: Vascular;  Laterality: Left;  14.5 min  1179.85 mGy  341.01 Gycm2  240 ml dye    THROMBECTOMY  10/22/2021    Procedure: THROMBECTOMY;  Surgeon: Saad Arenas MD;  Location: [facility] CATH LAB/EP;  Service: Cardiology;;       Review of patient's allergies indicates:   Allergen Reactions    Ciprofloxacin Itching    Contrast media      Kidney injury    Iodine      Kidney injury       Current Facility-Administered Medications on File Prior to Encounter   Medication    lactated ringers infusion    [DISCONTINUED] GENERIC EXTERNAL MEDICATION    [DISCONTINUED] GENERIC EXTERNAL MEDICATION     Current Outpatient Medications on File Prior to Encounter   Medication Sig    acetaminophen (TYLENOL) 500 MG tablet Take 2 tablets (1,000 mg total) by mouth every 8 (eight) hours as needed for Pain.    allopurinoL (ZYLOPRIM) 100 MG tablet Take 1 tablet (100 mg total) by mouth once daily.    amoxicillin-clavulanate 875-125mg (AUGMENTIN) 875-125  mg per tablet Take 1 tablet by mouth 2 (two) times daily.    apixaban (ELIQUIS) 5 mg Tab Take 1 tablet (5 mg total) by mouth 2 (two) times daily.    atorvastatin (LIPITOR) 80 MG tablet Take 1 tablet (80 mg total) by mouth every evening.    blood-glucose meter,continuous (DEXCOM G6 ) Misc Use to check blood sugars 4 times/day    blood-glucose sensor (DEXCOM G6 SENSOR) Radha Apply one sensor to skin every 10 days    blood-glucose transmitter (DEXCOM G6 TRANSMITTER) Radha Replace transmitter every 3 months to use with dexcom sensor    clopidogreL (PLAVIX) 75 mg tablet Take 1 tablet (75 mg total) by mouth once daily.    furosemide (LASIX) 40 MG tablet Take 1 tablet (40 mg total) by mouth once daily.    HYDROcodone-acetaminophen (NORCO) 5-325 mg per tablet Take 1 tablet by mouth every 4 (four) hours as needed for Pain.    insulin aspart U-100 (NOVOLOG FLEXPEN U-100 INSULIN) 100 unit/mL (3 mL) InPn pen Inject 5 Units into the skin 3 (three) times daily with meals. If not eating, ok to hold    insulin detemir U-100 (LEVEMIR FLEXTOUCH) 100 unit/mL (3 mL) SubQ InPn pen Inject 8 Units into the skin once daily. (Patient taking differently: Inject 8 Units into the skin every evening.)    magnesium oxide (MAG-OX) 400 mg (241.3 mg magnesium) tablet Take 1 tablet (400 mg total) by mouth 2 (two) times daily.    melatonin (MELATIN) 3 mg tablet Take 2 tablets (6 mg total) by mouth nightly as needed for Insomnia.    mirtazapine (REMERON) 7.5 MG Tab Take 2 tablets (15 mg total) by mouth nightly.    multivit/folic acid/vit K1 (ONE-A-DAY WOMEN'S 50 PLUS ORAL) Take 1 tablet by mouth Daily.    pregabalin (LYRICA) 50 MG capsule Take 3 capsules (150 mg total) by mouth 3 (three) times daily.    sertraline (ZOLOFT) 50 MG tablet Take 1 tablet (50 mg total) by mouth once daily.    sodium bicarbonate 650 MG tablet Take 1 tablet (650 mg total) by mouth 2 (two) times daily.    [DISCONTINUED] lancing device Misc 1 Device by  Misc.(Non-Drug; Combo Route) route 2 (two) times daily with meals.     Family History     Problem Relation (Age of Onset)    Diabetes Mother, Father, Paternal Grandmother    Heart disease Maternal Grandmother    No Known Problems Maternal Grandfather, Paternal Grandfather        Tobacco Use    Smoking status: Former Smoker    Smokeless tobacco: Never Used   Substance and Sexual Activity    Alcohol use: No    Drug use: Yes     Types: Marijuana     Comment: occassional    Sexual activity: Yes     Partners: Male     Review of Systems   Constitutional: Positive for fatigue. Negative for chills, diaphoresis and fever.   HENT: Negative for hearing loss and sore throat.    Eyes: Negative for visual disturbance.   Respiratory: Negative for cough and shortness of breath.    Cardiovascular: Negative for chest pain and leg swelling.   Gastrointestinal: Negative for abdominal pain, diarrhea, nausea and vomiting.   Genitourinary: Negative for difficulty urinating and flank pain.   Musculoskeletal: Negative for back pain.   Skin: Positive for wound. Negative for rash.   Neurological: Negative for dizziness, weakness and headaches.   Psychiatric/Behavioral: Negative for agitation and confusion.     Objective:     Vital Signs (Most Recent):  Temp: 98.2 °F (36.8 °C) (01/17/22 1937)  Pulse: 93 (01/17/22 1937)  Resp: 20 (01/17/22 1937)  BP: (!) 194/96 (01/17/22 1937)  SpO2: 99 % (01/17/22 1937) Vital Signs (24h Range):  Temp:  [98.2 °F (36.8 °C)-99.1 °F (37.3 °C)] 98.2 °F (36.8 °C)  Pulse:  [] 93  Resp:  [17-20] 20  SpO2:  [98 %-100 %] 99 %  BP: (153-230)/() 194/96     Weight: 70.7 kg (155 lb 13.8 oz)  Body mass index is 25.94 kg/m².    Physical Exam  Vitals and nursing note reviewed.   Constitutional:       General: She is not in acute distress.     Appearance: She is obese. She is not ill-appearing.   HENT:      Head: Normocephalic and atraumatic.      Mouth/Throat:      Mouth: Mucous membranes are moist.   Eyes:       Extraocular Movements: Extraocular movements intact.      Pupils: Pupils are equal, round, and reactive to light.   Cardiovascular:      Rate and Rhythm: Normal rate and regular rhythm.      Pulses: Normal pulses.      Heart sounds: Normal heart sounds. No murmur heard.  No friction rub. No gallop.    Pulmonary:      Effort: Pulmonary effort is normal. No respiratory distress.      Breath sounds: No wheezing or rhonchi.   Abdominal:      General: Bowel sounds are normal. There is no distension.      Palpations: Abdomen is soft.      Tenderness: There is no abdominal tenderness. There is no guarding.   Musculoskeletal:         General: No swelling.      Cervical back: Normal range of motion and neck supple.      Right Lower Extremity: Right leg is amputated below knee.      Left Lower Extremity: Left leg is amputated below knee.   Feet:      Right foot:      Skin integrity: Erythema and warmth present.      Comments: S/p right BKA 12/28.  Staples intact. Wound dehisced, primary to lateral surface.  Serous colored drainage noted throughout bandages with small amount of active drainage from wound.  Eschar noted primarily to lateral surface. Mild surrounding erythema.  Warmth to touch. See images below  Left BKA at baseline, uncomplicated.  No open wounds.  Skin:     General: Skin is warm and dry.      Capillary Refill: Capillary refill takes less than 2 seconds.      Findings: No bruising, erythema or rash.   Neurological:      General: No focal deficit present.      Mental Status: She is alert and oriented to person, place, and time.   Psychiatric:         Mood and Affect: Mood normal.         Behavior: Behavior normal. Behavior is cooperative.                   CRANIAL NERVES     CN III, IV, VI   Pupils are equal, round, and reactive to light.       Significant Labs:   All pertinent labs within the past 24 hours have been reviewed.  A1C:   Recent Labs   Lab 12/03/21  1730   HGBA1C 9.6*     Bilirubin:   Recent Labs    Lab 12/21/21  1702 01/11/22  1815 01/17/22  1547   BILITOT 0.2 0.2 0.2     BMP:   Recent Labs   Lab 01/17/22  1547   *      K 4.0      CO2 20*   BUN 24*   CREATININE 1.2   CALCIUM 8.1*     CBC:   Recent Labs   Lab 01/17/22  1547   WBC 10.66   HGB 8.7*   HCT 27.4*        CMP:   Recent Labs   Lab 01/17/22  1547      K 4.0      CO2 20*   *   BUN 24*   CREATININE 1.2   CALCIUM 8.1*   PROT 6.6   ALBUMIN 1.7*   BILITOT 0.2   ALKPHOS 97   AST 20   ALT 17   ANIONGAP 14   EGFRNONAA 51*     Lactic Acid:   Recent Labs   Lab 01/17/22  1547   LACTATE 1.5     POCT Glucose:   Recent Labs   Lab 01/17/22  1745   POCTGLUCOSE 234*       Significant Imaging: I have reviewed all pertinent imaging results/findings within the past 24 hours.    Assessment/Plan:     * Infected wound  S/p right bka with Dr. Rojas on 12/28/21  Presents to ED with concerns for wound infection  BKA site with dehisced wound  Pending cultures  Received zosyn x1  Will continue with vancomycin and cefazolin  General surgery consulted for possible debridement  PT/OT    S/P BKA (below knee amputation) unilateral, right  See above      Phantom limb syndrome with pain  Resume home lyrica  Norco q4hr prn     Type 2 diabetes mellitus with diabetic nephropathy, with long-term current use of insulin  Hemoglobin A1C   Date Value Ref Range Status   12/03/2021 9.6 (H) 4.0 - 5.6 % Final     Comment:     ADA Screening Guidelines:  5.7-6.4%  Consistent with prediabetes  >or=6.5%  Consistent with diabetes    High levels of fetal hemoglobin interfere with the HbA1C  assay. Heterozygous hemoglobin variants (HbS, HgC, etc)do  not significantly interfere with this assay.   However, presence of multiple variants may affect accuracy.                                       Patient's FSGs are uncontrolled due to hyperglycemia on current medication regimen.  - home regimen includes detemir, aspart  - hold home oral medications  - initiate on  detemir 10u daily   - LDSSI with ACCUcheck ACHS  - Diabetic diet  -Hypoglycemia protocol PRN        Paroxysmal atrial fibrillation  Patient with paroxymal afib  Resume home OAC  Monitor on telemetry          Mixed hyperlipidemia  Last LDL was   Lab Results   Component Value Date    LDLCALC 177.6 (H) 12/03/2021      Patient is chronically on statin.will continue for now.   Monitor clinically.          MDD (major depressive disorder), recurrent episode, moderate  Resume zoloft      Peripheral vascular disease  Resume statin       VTE Risk Mitigation (From admission, onward)         Ordered     apixaban tablet 5 mg  2 times daily         01/17/22 1920     IP VTE HIGH RISK PATIENT  Once         01/17/22 1658     Place sequential compression device  Until discontinued         01/17/22 1658                 Janeth Butler, CHRIS, ACNPC-AG, CCRN  Hospitalist  Department of [facility] Medicine  [facility]   [phone]

## 2022-01-18 NOTE — CONSULTS
Today`s Date: 1/18/2022     Admit Date: 1/17/2022    Admitting Physician: Obinna Marr DO    Patient`s Name: Suyapa Connelly , 56 y.o. female    Reason for consultation  Patient known to me, s/p right below amputation   Patient Active Problem List:     Gangrene     Peripheral vascular disease     PAD (peripheral artery disease)     Moderate malnutrition     Essential hypertension     Vitamin D deficiency     CAROLIN (generalized anxiety disorder)     MDD (major depressive disorder), recurrent episode, moderate     Acute on chronic diastolic CHF (congestive heart failure)     Mixed hyperlipidemia     Type 2 diabetes mellitus with hyperglycemia, with long-term current use of insulin     Coronary atherosclerosis     S/P CABG x 1     Paroxysmal atrial fibrillation     Stage 3 chronic kidney disease     Critical lower limb ischemia     S/P femoral-popliteal bypass surgery     Uncontrolled type 2 diabetes mellitus with hyperglycemia     Type 2 diabetes mellitus with diabetic nephropathy, with long-term current use of insulin     Chronic anemia     Toe ulcer, left, with fat layer exposed     Thrombosis of femoro-popliteal bypass graft     Impaired functional mobility and endurance     Nephrotic syndrome     Ulcer of left foot with necrosis of bone     Ulcer of left foot     Uremia     Class 2 severe obesity due to excess calories with serious comorbidity in adult     Vascular graft infection     Hypoalbuminemia     Postmenopausal bleeding     Uterine fibroid     Diabetic ulcer of left foot associated with type 2 diabetes mellitus, with fat layer exposed     Diabetic ulcer of left lower leg with fat layer exposed     Muscle wasting and atrophy, not elsewhere classified, right thigh      Adjustment disorder with mixed anxiety and depressed mood     Amputation above knee     Phantom limb syndrome with pain     Chronic diastolic heart failure     Acute osteomyelitis of calcaneum     Acute-on-chronic renal failure     Stage  3 chronic kidney disease due to type 2 diabetes mellitus     Status post above knee amputation, left     Peripheral neuropathic pain     Impaired eye movement     Cellulitis     Diabetic ulcer of right foot associated with type 2 diabetes mellitus     Osteomyelitis of right foot     S/P BKA (below knee amputation) unilateral, right     Infected wound      Past Medical History:  No date: Anxiety  No date: Chronic pain syndrome  No date: CKD (chronic kidney disease), stage III  No date: Depression  No date: Diabetes mellitus      Comment:  type 2  No date: Diabetes mellitus, type 2  No date: GERD (gastroesophageal reflux disease)  3/23/2021: Hyperemesis  No date: Hyperlipemia  No date: Hypertension  3/23/2021: Hypokalemia  2010: Myocardial infarction      Comment:  minor-caused by stress per pt.  No date: Osteomyelitis  4/30/2021: Osteomyelitis of left foot  No date: PVD (peripheral vascular disease)  No date: Vaginal delivery      Comment:  x1    Past Surgical History:  5/18/2021: ABOVE-KNEE AMPUTATION; Left      Comment:  Procedure: AMPUTATION, ABOVE KNEE;  Surgeon: Teddy Huber MD;  Location: [facility] OR [address] [place] [address];  Service:                Vascular;  Laterality: Left;  7/9/15: Angiogram - Right Extremity; Right  10/6/15: angiogram-left leg  4/29/2021: ANGIOGRAPHY OF LOWER EXTREMITY; Left      Comment:  Procedure: ANGIOGRAM, LOWER EXTREMITY;  Surgeon: Teddy Huber MD;  Location: [address];  Service:                Vascular;  Laterality: Left;  12/28/2021: BELOW KNEE AMPUTATION OF LOWER EXTREMITY; Right      Comment:  Procedure: AMPUTATION, BELOW KNEE;  Surgeon: Kaitlyn Rojas MD;  Location: [facility];  Service: General;                 Laterality: Right;  12/18/2019: CATHETERIZATION OF BOTH LEFT AND RIGHT HEART; N/A      Comment:  Procedure: CATHETERIZATION, HEART, BOTH LEFT AND RIGHT;                Surgeon: Que Fernando III, MD;  Location: [facility]  CATH               LAB;  Service: Cardiology;  Laterality: N/A;  12/18/2019: CORONARY ANGIOGRAPHY; N/A      Comment:  Procedure: ANGIOGRAM, CORONARY ARTERY;  Surgeon: Que Fernando III, MD;  Location: [facility] CATH LAB;  Service:                Cardiology;  Laterality: N/A;  7/28/2020: CORONARY ANGIOGRAPHY INCLUDING BYPASS GRAFTS WITH   CATHETERIZATION OF LEFT HEART; N/A      Comment:  Procedure: ANGIOGRAM, CORONARY, INCLUDING BYPASS GRAFT,                WITH LEFT HEART CATHETERIZATION, 9 am;  Surgeon: Rachel Easley MD;  Location: [facility] CATH LAB;  Service:                Cardiology;  Laterality: N/A;  1/14/2020: CORONARY ARTERY BYPASS GRAFT (CABG); N/A      Comment:  Procedure: CORONARY ARTERY BYPASS GRAFT (CABG) x 1                    Off Pump;  Surgeon: Huang Altamirano MD;  Location:                [address];  Service: Cardiovascular;  Laterality:                N/A;  4/29/2021: CREATION OF FEMORAL-TIBIAL ARTERY BYPASS; Left      Comment:  Procedure: CREATION, BYPASS, ARTERIAL, FEMORAL TO                ANTERIOR TIBIAL;  Surgeon: Teddy Huber MD;                 Location: [facility] OR [address] [place] [address];  Service: Vascular;                 Laterality: Left;  8/18/2020: CREATION OF FEMOROPOPLITEAL ARTERIAL BYPASS USING GRAFT;   Left      Comment:  Procedure: CREATION, BYPASS, ARTERIAL, FEMORAL TO                POPLITEAL, USING GRAFT, LEFT LOWER EXTREMITY;  Surgeon:                Teddy Huber MD;  Location: [facility];  Service:                Vascular;  Laterality: Left;  REQUEST 7:15 A.M.                START----COVID NEGATIVE ON 8/171ST CASE STARTE PER                [facility] 8/12/2020                  T/S-----CLEARED BY CARDS-------PENDING INSURANCE  9/8/2020: DEBRIDEMENT OF FOOT; Left      Comment:  Procedure: DEBRIDEMENT, FOOT;  Surgeon: Rosio Mayes DPM;  Location: [facility];  Service: Podiatry;  Laterality:                Left;  request neoxx . RN Pre Op 9-4-2020, Covid                negative on 9/5/20. C A  3/4/2021: DEBRIDEMENT OF FOOT      Comment:  Procedure: DEBRIDEMENT, FOOT;  Surgeon: Teddy Huber MD;  Location: [facility] OR;  Service: Vascular;;  3/9/2021: DEBRIDEMENT OF FOOT; Left      Comment:  Procedure: DEBRIDEMENT, FOOT, bone biopsy;  Surgeon:                Rosio Mayes DPM;  Location: [facility] OR;  Service:                Podiatry;  Laterality: Left;  Request neoxx---COVID IN                AMREQUESTING NOON STARTRN Phone Pre op.On Blood                thinners Plavix and Eliquis.  Covid am of surgery. C A  5/4/2021: DEBRIDEMENT OF FOOT; Left      Comment:  Procedure: DEBRIDEMENT, FOOT;  Surgeon: Farooq Morley DPM;  Location: [facility] OR [address] [place] [address];  Service:                Podiatry;  Laterality: Left;  1/27/2020: INSERTION OF TUNNELED CENTRAL VENOUS HEMODIALYSIS   CATHETER; N/A      Comment:  Procedure: Insertion, Catheter, Central Venous,                Hemodialysis;  Surgeon: ESTEBAN Gomez III, MD;                 Location: [facility] CATH LAB;  Service: Peripheral Vascular;                 Laterality: N/A;  3/4/2021: PERCUTANEOUS TRANSLUMINAL ANGIOPLASTY; N/A      Comment:  Procedure: PTA (ANGIOPLASTY, PERCUTANEOUS,                TRANSLUMINAL);  Surgeon: Teddy Huber MD;                 Location: [facility] OR;  Service: Vascular;  Laterality: N/A;  5/27/2021: REMOVAL OF ARTERIOVENOUS GRAFT; Left      Comment:  Procedure: REMOVAL, GRAFT, LEFT LOWER EXTREMITY, WOUND                EXPLORATION;  Surgeon: Teddy Huber MD;                 [facility];  Service: Vascular;                 Laterality: Left;  3/9/2021: REMOVAL OF NAIL OF DIGIT; Left      Comment:  Procedure: REMOVAL, NAIL, DIGIT;  Surgeon: Rosio Mayes DPM;  Location: [facility] OR;  Service: Podiatry;                 Laterality: Left;  3/4/2021: THROMBECTOMY; Left      Comment:  Procedure:  THROMBECTOMY, LEFT LOWER EXTREMITY BYPASS                GRAFT, ANGIOGRAM, POSSIBLE INTERVENTION, POSSIBLE LEFT                LOWER EXTREMITY BYPASS;  Surgeon: Teddy Huber MD;  Location: [facility] OR;  Service: Vascular;  Laterality:                Left;  4/29/2021: THROMBECTOMY; Left      Comment:  Procedure: GRAFT THROMBECTOMY, LEFT LOWER EXTREMITY;                 Surgeon: Teddy Huber MD;  Location: [facility] OR 2ND                [facility];  Service: Vascular;  Laterality: Left;  14.5                [id number] ml dye  10/22/2021: THROMBECTOMY      Comment:  Procedure: THROMBECTOMY;  Surgeon: Saad Arenas MD;                 Location: [facility] CATH LAB/EP;  Service: Cardiology;;    Prior to Admission medications :  Medication acetaminophen (TYLENOL) 500 MG tablet, Sig Take 2 tablets (1,000 mg total) by mouth every 8 (eight) hours as needed for Pain., Start Date 6/25/21, End Date , Taking? , Authorizing Provider Ruma Schulte MD    Medication allopurinoL (ZYLOPRIM) 100 MG tablet, Sig Take 1 tablet (100 mg total) by mouth once daily., Start Date 7/31/20, End Date , Taking? , Authorizing Provider Hitesh Mason MD    Medication amoxicillin-clavulanate 875-125mg (AUGMENTIN) 875-125 mg per tablet, Sig Take 1 tablet by mouth 2 (two) times daily., Start Date 1/11/22, End Date , Taking? , Authorizing Provider Mayra Ribeiro, ANDREA    Medication apixaban (ELIQUIS) 5 mg Tab, Sig Take 1 tablet (5 mg total) by mouth 2 (two) times daily., Start Date 8/23/21, End Date , Taking? , Authorizing Provider Sada Arenas MD    Medication atorvastatin (LIPITOR) 80 MG tablet, Sig Take 1 tablet (80 mg total) by mouth every evening., Start Date 6/8/21, End Date 6/8/22, Taking? , Authorizing Provider NONI Hanna MD    Medication blood-glucose meter,continuous (DEXCOM G6 ) Misc, Sig Use to check blood sugars 4 times/day, Start Date 12/3/21, End Date , Taking? , Authorizing  Provider Fawad Jameson MD    Medication blood-glucose sensor (DEXCOM G6 SENSOR) Radha, Sig Apply one sensor to skin every 10 days, Start Date 12/3/21, End Date , Taking? , Authorizing Provider Fawad Jameson MD    Medication blood-glucose transmitter (DEXCOM G6 TRANSMITTER) Radha, Sig Replace transmitter every 3 months to use with dexcom sensor, Start Date 12/3/21, End Date , Taking? , Authorizing Provider Fawad Jameson MD    Medication clopidogreL (PLAVIX) 75 mg tablet, Sig Take 1 tablet (75 mg total) by mouth once daily., Start Date 10/22/21, End Date , Taking? , Authorizing Provider Saad Arenas MD    Medication furosemide (LASIX) 40 MG tablet, Sig Take 1 tablet (40 mg total) by mouth once daily., Start Date 1/10/22, End Date , Taking? , Authorizing Provider Ruben Flood MD    Medication HYDROcodone-acetaminophen (NORCO) 5-325 mg per tablet, Sig Take 1 tablet by mouth every 4 (four) hours as needed for Pain., Start Date 1/11/22, End Date , Taking? , Authorizing Provider Guy J. Lefort, MD    Medication insulin aspart U-100 (NOVOLOG FLEXPEN U-100 INSULIN) 100 unit/mL (3 mL) InPn pen, Sig Inject 5 Units into the skin 3 (three) times daily with meals. If not eating, ok to hold, Start Date 4/13/21, End Date , Taking? , Authorizing Provider Maria Del Rosario Moreira NP    Medication insulin detemir U-100 (LEVEMIR FLEXTOUCH) 100 unit/mL (3 mL) SubQ InPn pen, Sig Inject 8 Units into the skin once daily.Patient taking differently: Inject 8 Units into the skin every evening., Start Date 6/9/21, End Date 6/9/22, Taking? , Authorizing Provider NONI Hanna MD    Medication magnesium oxide (MAG-OX) 400 mg (241.3 mg magnesium) tablet, Sig Take 1 tablet (400 mg total) by mouth 2 (two) times daily., Start Date 6/25/21, End Date , Taking? , Authorizing Provider Ruma Schulte MD    Medication melatonin (MELATIN) 3 mg tablet, Sig Take 2 tablets (6 mg total) by mouth nightly as needed for Insomnia., Start Date 6/25/21,  End Date , Taking? , Authorizing Provider Ruma Schulte MD    Medication mirtazapine (REMERON) 7.5 MG Tab, Sig Take 2 tablets (15 mg total) by mouth nightly., Start Date 1/10/22, End Date 1/10/23, Taking? , Authorizing Provider Ruben Flood MD    Medication multivit/folic acid/vit K1 (ONE-A-DAY WOMEN'S 50 PLUS ORAL), Sig Take 1 tablet by mouth Daily., Start Date , End Date , Taking? , Authorizing Provider Historical Provider    Medication pregabalin (LYRICA) 50 MG capsule, Sig Take 3 capsules (150 mg total) by mouth 3 (three) times daily., Start Date 1/10/22, End Date 7/11/22, Taking? , Authorizing Provider Ruben Flood MD    Medication sertraline (ZOLOFT) 50 MG tablet, Sig Take 1 tablet (50 mg total) by mouth once daily., Start Date 1/11/22, End Date 1/11/23, Taking? , Authorizing Provider Ruben Flood MD    Medication sodium bicarbonate 650 MG tablet, Sig Take 1 tablet (650 mg total) by mouth 2 (two) times daily., Start Date 7/1/21, End Date 7/1/22, Taking? , Authorizing Provider Ruma Schulte MD    Medication lancing device Misc, Sig 1 Device by Misc.(Non-Drug; Combo Route) route 2 (two) times daily with meals., Start Date 5/7/18, End Date 2/24/21, Taking? , Authorizing Provider Rosales Barton MD      Current Facility-Administered Medications on File Prior to Encounter:  lactated ringers infusion, , Intravenous, Continuous, Abram Pacheco MD, New Bag at 03/09/21 0744  [DISCONTINUED] GENERIC EXTERNAL MEDICATION, , , , Generic External Data Provider  [DISCONTINUED] GENERIC EXTERNAL MEDICATION, , , , Generic External Data Provider    Current Outpatient Medications on File Prior to Encounter:  acetaminophen (TYLENOL) 500 MG tablet, Take 2 tablets (1,000 mg total) by mouth every 8 (eight) hours as needed for Pain., Disp: , Rfl: 0  allopurinoL (ZYLOPRIM) 100 MG tablet, Take 1 tablet (100 mg total) by mouth once daily., Disp: 30 tablet, Rfl: 5  amoxicillin-clavulanate 875-125mg  (AUGMENTIN) 875-125 mg per tablet, Take 1 tablet by mouth 2 (two) times daily., Disp: 14 tablet, Rfl: 0  apixaban (ELIQUIS) 5 mg Tab, Take 1 tablet (5 mg total) by mouth 2 (two) times daily., Disp: 180 tablet, Rfl: 3  atorvastatin (LIPITOR) 80 MG tablet, Take 1 tablet (80 mg total) by mouth every evening., Disp: 90 tablet, Rfl: 3  blood-glucose meter,continuous (DEXCOM G6 ) Misc, Use to check blood sugars 4 times/day, Disp: 1 each, Rfl: 0  blood-glucose sensor (DEXCOM G6 SENSOR) Radha, Apply one sensor to skin every 10 days, Disp: 3 each, Rfl: 11  blood-glucose transmitter (DEXCOM G6 TRANSMITTER) Radha, Replace transmitter every 3 months to use with dexcom sensor, Disp: 1 each, Rfl: 3  clopidogreL (PLAVIX) 75 mg tablet, Take 1 tablet (75 mg total) by mouth once daily., Disp: 90 tablet, Rfl: 3  furosemide (LASIX) 40 MG tablet, Take 1 tablet (40 mg total) by mouth once daily., Disp: , Rfl:   HYDROcodone-acetaminophen (NORCO) 5-325 mg per tablet, Take 1 tablet by mouth every 4 (four) hours as needed for Pain., Disp: 12 tablet, Rfl: 0  insulin aspart U-100 (NOVOLOG FLEXPEN U-100 INSULIN) 100 unit/mL (3 mL) InPn pen, Inject 5 Units into the skin 3 (three) times daily with meals. If not eating, ok to hold, Disp: 1 Box, Rfl: 3  insulin detemir U-100 (LEVEMIR FLEXTOUCH) 100 unit/mL (3 mL) SubQ InPn pen, Inject 8 Units into the skin once daily. (Patient taking differently: Inject 8 Units into the skin every evening.), Disp: 3 mL, Rfl: 3  magnesium oxide (MAG-OX) 400 mg (241.3 mg magnesium) tablet, Take 1 tablet (400 mg total) by mouth 2 (two) times daily., Disp: , Rfl: 0  melatonin (MELATIN) 3 mg tablet, Take 2 tablets (6 mg total) by mouth nightly as needed for Insomnia., Disp: , Rfl: 0  mirtazapine (REMERON) 7.5 MG Tab, Take 2 tablets (15 mg total) by mouth nightly., Disp: 60 tablet, Rfl: 11  multivit/folic acid/vit K1 (ONE-A-DAY WOMEN'S 50 PLUS ORAL), Take 1 tablet by mouth Daily., Disp: , Rfl:   pregabalin (LYRICA)  50 MG capsule, Take 3 capsules (150 mg total) by mouth 3 (three) times daily., Disp: 90 capsule, Rfl: 0  sertraline (ZOLOFT) 50 MG tablet, Take 1 tablet (50 mg total) by mouth once daily., Disp: 30 tablet, Rfl: 11  sodium bicarbonate 650 MG tablet, Take 1 tablet (650 mg total) by mouth 2 (two) times daily., Disp: 60 tablet, Rfl: 11  [DISCONTINUED] lancing device Misc, 1 Device by Misc.(Non-Drug; Combo Route) route 2 (two) times daily with meals., Disp: 1 each, Rfl: 0         Review of patient's allergies indicates:   -- Ciprofloxacin -- Itching   -- Contrast media     --  Kidney injury   -- Iodine     --  Kidney injury    Social History:   reports that she has quit smoking. She has never used smokeless tobacco. She reports current drug use. Drug: Marijuana. She reports that she does not drink alcohol.     Review of patient's family history indicates:  Problem: Diabetes      Relation: Mother          Age of Onset: (Not Specified)  Problem: Diabetes      Relation: Father          Age of Onset: (Not Specified)  Problem: Heart disease      Relation: Maternal Grandmother          Age of Onset: (Not Specified)  Problem: No Known Problems      Relation: Maternal Grandfather          Age of Onset: (Not Specified)  Problem: Diabetes      Relation: Paternal Grandmother          Age of Onset: (Not Specified)  Problem: No Known Problems      Relation: Paternal Grandfather          Age of Onset: (Not Specified)  Problem: Anesthesia problems      Relation: Neg Hx          Age of Onset: (Not Specified)      PHYSICAL EXAMINATION  Temp:  [98.1 °F (36.7 °C)-99.1 °F (37.3 °C)] 98.1 °F (36.7 °C)  Pulse:  [] 90  Resp:  [17-20] 18  SpO2:  [97 %-100 %] 99 %  BP: (138-230)/() 169/85    General Condition:   alert x 3     Head & Neck  Anemia: None  Jaundice: None  Neck vein: Not distended  Carotid Bruits: none  Lymph nodes: none palpable  Thyroid: normal    Chest: normal    Heart: normal    Rt. Breast: not examined  Lt. Breast:  not examined  Axillary lymph nodes: none    Abdomen: Soft,  None tender with no palpable mass or organ  Hernia: none    Rectal: Defered    Extremities: normal    Vascular: normal    Specific focus Examination  Left AKA stump healing well    Imp: Right BKA stump with scab formation at the lateal edges of the wound. DM. HTN . CRF    Plan: check cultures , apply santyl , xeroform , 4 x 4 , kerlix , and ace bandage daily, iv antibioticus, , PT/OT

## 2022-01-18 NOTE — PLAN OF CARE
TN met with pt -- hx of L BKA   s/p R bka 12/28 - d/c'd to [facility] 1/10 -- back to ED 1/11 then d/c'd to home     no home health booked     now here with R stump dehiscence and infection - cx pending.  IV abx     pt wants to return to [facility] at d/c       pt lives with  Randell  - he drives and assists with ADL as needed.    Pt discussed at length the emotional difficulty of bilat lower ext amputations - TN utilized therapeutic use of self -- TN will consult  to meet with pt as well     pt has a wc and bsc.              01/18/22 1720   Discharge Planning   Assessment Type Discharge Planning Brief Assessment   Resource/Environmental Concerns other (see comments)  (post d/c - wants IPR)   Support Systems Spouse/significant other   Equipment Currently Used at Home bedside commode;wheelchair   Current Living Arrangements home/apartment/condo   Patient/Family Anticipates Transition to inpatient rehabilitation facility   Patient/Family Anticipated Services at Transition none   DME Needed Upon Discharge  none   Discharge Plan A Rehab

## 2022-01-18 NOTE — ASSESSMENT & PLAN NOTE
Contributing Nutrition Diagnosis  Increased energy/protein needs    Related to (etiology):   Wound healing    Signs and Symptoms (as evidenced by):   Infected amputation stump    Interventions:  Collaboration with other providers  Commercial beverage  Increased protein diet    Nutrition Diagnosis Status:   Continues

## 2022-01-18 NOTE — PROGRESS NOTES
[facility] Medicine  Progress Note    Patient Name: Suyapa Connelly  MRN: [id number]  Patient Class: OP- Observation   Admission Date: 1/17/2022  Length of Stay: 0 days  Attending Physician: Obinna Marr DO  Primary Care Provider: Magen Christensen MD        Subjective:     Principal Problem:Infected wound        HPI:  Suyapa Connelly is a 55 y/o female with PMHx CKD3, DMII, GERD, HLD, HTN, osteomyelitis, PVD, depression, and anxiety presents to ED with  with continue concern for wound infection.  Patient underwent right BKA by Dr. Rojas on 12/28 then discharged to rehab facility on 1/10.  Rehab facility staff became concerned for possible wound infection and patient was sent back to ED on 1/11.  Lab work was obtained, and images of wound were reviewed with Dr. Rojas, who recommended continued outpatient management.  At discharge patient was prescribed Augmentin, which she has continued taking as instructed.   at bedside is concern for progressively worsening appearance of wound, along with continued drainage.  Patient reporting worsened pain today described as sharp, worse with touch, radiating towards right knee, severity 8/10.  No fevers, chills, nausea, vomiting, chest pain, cough, shortness of breath, abdominal pain, urinary or bowel complications.  She does admit to not taking her BP medication today.  No other acute complaints at this time. Will admit to hospital medicine for observation services. General surgery consulted.       Overview/Hospital Course:  No notes on file    Interval History:  no acute events overnight, no new complaints    Review of Systems   Constitutional: Positive for fatigue. Negative for chills, diaphoresis and fever.   HENT: Negative for hearing loss and sore throat.    Eyes: Negative for visual disturbance.   Respiratory: Negative for cough and shortness of breath.    Cardiovascular: Negative for chest pain and leg swelling.   Gastrointestinal:  Negative for abdominal pain, diarrhea, nausea and vomiting.   Genitourinary: Negative for difficulty urinating and flank pain.   Musculoskeletal: Negative for back pain.   Skin: Positive for wound. Negative for rash.   Neurological: Negative for dizziness, weakness and headaches.   Psychiatric/Behavioral: Negative for agitation and confusion.     Objective:     Vital Signs (Most Recent):  Temp: 98.4 °F (36.9 °C) (01/18/22 1520)  Pulse: 91 (01/18/22 1520)  Resp: 18 (01/18/22 1520)  BP: (!) 191/86 (01/18/22 1520)  SpO2: 97 % (01/18/22 1520) Vital Signs (24h Range):  Temp:  [97.7 °F (36.5 °C)-99.1 °F (37.3 °C)] 98.4 °F (36.9 °C)  Pulse:  [83-97] 91  Resp:  [17-20] 18  SpO2:  [97 %-100 %] 97 %  BP: (138-223)/() 191/86     Weight: 76.2 kg (167 lb 15.9 oz)  Body mass index is 27.96 kg/m².    Intake/Output Summary (Last 24 hours) at 1/18/2022 1744  Last data filed at 1/18/2022 1200  Gross per 24 hour   Intake 240 ml   Output 0 ml   Net 240 ml      Physical Exam  Vitals and nursing note reviewed.   Constitutional:       General: She is not in acute distress.     Appearance: She is obese. She is not ill-appearing.   HENT:      Head: Normocephalic and atraumatic.      Mouth/Throat:      Mouth: Mucous membranes are moist.   Eyes:      Extraocular Movements: Extraocular movements intact.      Pupils: Pupils are equal, round, and reactive to light.   Cardiovascular:      Rate and Rhythm: Normal rate and regular rhythm.      Pulses: Normal pulses.      Heart sounds: Normal heart sounds. No murmur heard.  No friction rub. No gallop.    Pulmonary:      Effort: Pulmonary effort is normal. No respiratory distress.      Breath sounds: No wheezing or rhonchi.   Abdominal:      General: Bowel sounds are normal. There is no distension.      Palpations: Abdomen is soft.      Tenderness: There is no abdominal tenderness. There is no guarding.   Musculoskeletal:         General: No swelling.      Cervical back: Normal range of motion  and neck supple.      Right Lower Extremity: Right leg is amputated below knee.      Left Lower Extremity: Left leg is amputated below knee.   Feet:      Right foot:      Skin integrity: Erythema and warmth present.      Comments: S/p right BKA 12/28.  Staples intact. Wound dehisced, primary to lateral surface.  Serous colored drainage noted throughout bandages with small amount of active drainage from wound.  Eschar noted primarily to lateral surface. Mild surrounding erythema.  Warmth to touch. See images below  Left BKA at baseline, uncomplicated.  No open wounds.  Skin:     General: Skin is warm and dry.      Capillary Refill: Capillary refill takes less than 2 seconds.      Findings: No bruising, erythema or rash.      Comments: unstageable sacral decubitus   Neurological:      General: No focal deficit present.      Mental Status: She is alert and oriented to person, place, and time.   Psychiatric:         Mood and Affect: Mood normal.         Behavior: Behavior normal. Behavior is cooperative.         Significant Labs: All pertinent labs within the past 24 hours have been reviewed.    Significant Imaging: I have reviewed all pertinent imaging results/findings within the past 24 hours.      Assessment/Plan:      * Infected wound  S/p right bka with Dr. Rojas on 12/28/21  Presents to ED with concerns for wound infection  BKA site with dehisced wound  Pending cultures  Received zosyn x1  Will continue with vancomycin and cefazolin  General surgery consulted for possible debridement: check cultures , apply santyl , xeroform , 4 x 4 , kerlix , and ace bandage daily, iv antibioticus, , PT/OT      Decubitus ulcer of sacral region, unstageable  Wound Care assistance appreciated      S/P BKA (below knee amputation) unilateral, right  See above      Phantom limb syndrome with pain  Resume home lyrica  Norco q4hr prn     Type 2 diabetes mellitus with diabetic nephropathy, with long-term current use of  insulin  Hemoglobin A1C   Date Value Ref Range Status   12/03/2021 9.6 (H) 4.0 - 5.6 % Final     Comment:     ADA Screening Guidelines:  5.7-6.4%  Consistent with prediabetes  >or=6.5%  Consistent with diabetes    High levels of fetal hemoglobin interfere with the HbA1C  assay. Heterozygous hemoglobin variants (HbS, HgC, etc)do  not significantly interfere with this assay.   However, presence of multiple variants may affect accuracy.                                       Patient's FSGs are uncontrolled due to hyperglycemia on current medication regimen.  - home regimen includes detemir, aspart  - hold home oral medications  - increased scheduled doses  - LDSSI with ACCUcheck ACHS  - Diabetic diet  -Hypoglycemia protocol PRN        Paroxysmal atrial fibrillation  Patient with paroxymal afib  Resume home OAC  Monitor on telemetry          Mixed hyperlipidemia  Last LDL was   Lab Results   Component Value Date    LDLCALC 177.6 (H) 12/03/2021      Patient is chronically on statin.will continue for now.   Monitor clinically.  Lipid panel in am        MDD (major depressive disorder), recurrent episode, moderate  Resume zoloft      Peripheral vascular disease  Resume statin         VTE Risk Mitigation (From admission, onward)         Ordered     apixaban tablet 5 mg  2 times daily         01/17/22 1920     IP VTE HIGH RISK PATIENT  Once         01/17/22 1658     Place sequential compression device  Until discontinued         01/17/22 1658                Discharge Planning   DEVAN:      Code Status: Full Code   Is the patient medically ready for discharge?:     Reason for patient still in hospital (select all that apply): Patient trending condition  Discharge Plan A: Rehab                  Shanice Duvall PA-C  Department of [facility] Medicine   [facility]

## 2022-01-18 NOTE — ASSESSMENT & PLAN NOTE
S/p right bka with Dr. Rojas on 12/28/21  Presents to ED with concerns for wound infection  BKA site with dehisced wound  Pending cultures  Received zosyn x1  Will continue with vancomycin and cefazolin  General surgery consulted for possible debridement: check cultures , apply santyl , xeroform , 4 x 4 , kerlix , and ace bandage daily, iv antibioticus, , PT/OT

## 2022-01-18 NOTE — PLAN OF CARE
Pt arrived to unit. Introduced self as VN for this shift. Admission questions completed by VN. Educated pt on VTE risk, safety precautions, and VN's role in pt care. Opportunity given for pt's questions. All questions answered.     Problem: Adult Inpatient Plan of Care  Goal: Plan of Care Review  Outcome: Ongoing, Progressing  Goal: Patient-Specific Goal (Individualized)  Outcome: Ongoing, Progressing  Goal: Absence of Hospital-Acquired Illness or Injury  Outcome: Ongoing, Progressing  Goal: Optimal Comfort and Wellbeing  Outcome: Ongoing, Progressing  Goal: Readiness for Transition of Care  Outcome: Ongoing, Progressing     Problem: Diabetes Comorbidity  Goal: Blood Glucose Level Within Targeted Range  Outcome: Ongoing, Progressing     Problem: Fluid and Electrolyte Imbalance (Acute Kidney Injury/Impairment)  Goal: Fluid and Electrolyte Balance  Outcome: Ongoing, Progressing     Problem: Oral Intake Inadequate (Acute Kidney Injury/Impairment)  Goal: Optimal Nutrition Intake  Outcome: Ongoing, Progressing     Problem: Renal Function Impairment (Acute Kidney Injury/Impairment)  Goal: Effective Renal Function  Outcome: Ongoing, Progressing     Problem: Infection  Goal: Absence of Infection Signs and Symptoms  Outcome: Ongoing, Progressing     Problem: Impaired Wound Healing  Goal: Optimal Wound Healing  Outcome: Ongoing, Progressing

## 2022-01-18 NOTE — SUBJECTIVE & OBJECTIVE
Interval History:  no acute events overnight, no new complaints    Review of Systems   Constitutional: Positive for fatigue. Negative for chills, diaphoresis and fever.   HENT: Negative for hearing loss and sore throat.    Eyes: Negative for visual disturbance.   Respiratory: Negative for cough and shortness of breath.    Cardiovascular: Negative for chest pain and leg swelling.   Gastrointestinal: Negative for abdominal pain, diarrhea, nausea and vomiting.   Genitourinary: Negative for difficulty urinating and flank pain.   Musculoskeletal: Negative for back pain.   Skin: Positive for wound. Negative for rash.   Neurological: Negative for dizziness, weakness and headaches.   Psychiatric/Behavioral: Negative for agitation and confusion.     Objective:     Vital Signs (Most Recent):  Temp: 98.4 °F (36.9 °C) (01/18/22 1520)  Pulse: 91 (01/18/22 1520)  Resp: 18 (01/18/22 1520)  BP: (!) 191/86 (01/18/22 1520)  SpO2: 97 % (01/18/22 1520) Vital Signs (24h Range):  Temp:  [97.7 °F (36.5 °C)-99.1 °F (37.3 °C)] 98.4 °F (36.9 °C)  Pulse:  [83-97] 91  Resp:  [17-20] 18  SpO2:  [97 %-100 %] 97 %  BP: (138-223)/() 191/86     Weight: 76.2 kg (167 lb 15.9 oz)  Body mass index is 27.96 kg/m².    Intake/Output Summary (Last 24 hours) at 1/18/2022 1744  Last data filed at 1/18/2022 1200  Gross per 24 hour   Intake 240 ml   Output 0 ml   Net 240 ml      Physical Exam  Vitals and nursing note reviewed.   Constitutional:       General: She is not in acute distress.     Appearance: She is obese. She is not ill-appearing.   HENT:      Head: Normocephalic and atraumatic.      Mouth/Throat:      Mouth: Mucous membranes are moist.   Eyes:      Extraocular Movements: Extraocular movements intact.      Pupils: Pupils are equal, round, and reactive to light.   Cardiovascular:      Rate and Rhythm: Normal rate and regular rhythm.      Pulses: Normal pulses.      Heart sounds: Normal heart sounds. No murmur heard.  No friction rub. No  gallop.    Pulmonary:      Effort: Pulmonary effort is normal. No respiratory distress.      Breath sounds: No wheezing or rhonchi.   Abdominal:      General: Bowel sounds are normal. There is no distension.      Palpations: Abdomen is soft.      Tenderness: There is no abdominal tenderness. There is no guarding.   Musculoskeletal:         General: No swelling.      Cervical back: Normal range of motion and neck supple.      Right Lower Extremity: Right leg is amputated below knee.      Left Lower Extremity: Left leg is amputated below knee.   Feet:      Right foot:      Skin integrity: Erythema and warmth present.      Comments: S/p right BKA 12/28.  Staples intact. Wound dehisced, primary to lateral surface.  Serous colored drainage noted throughout bandages with small amount of active drainage from wound.  Eschar noted primarily to lateral surface. Mild surrounding erythema.  Warmth to touch. See images below  Left BKA at baseline, uncomplicated.  No open wounds.  Skin:     General: Skin is warm and dry.      Capillary Refill: Capillary refill takes less than 2 seconds.      Findings: No bruising, erythema or rash.      Comments: unstageable sacral decubitus   Neurological:      General: No focal deficit present.      Mental Status: She is alert and oriented to person, place, and time.   Psychiatric:         Mood and Affect: Mood normal.         Behavior: Behavior normal. Behavior is cooperative.         Significant Labs: All pertinent labs within the past 24 hours have been reviewed.    Significant Imaging: I have reviewed all pertinent imaging results/findings within the past 24 hours.

## 2022-01-18 NOTE — ASSESSMENT & PLAN NOTE
Hemoglobin A1C   Date Value Ref Range Status   12/03/2021 9.6 (H) 4.0 - 5.6 % Final     Comment:     ADA Screening Guidelines:  5.7-6.4%  Consistent with prediabetes  >or=6.5%  Consistent with diabetes    High levels of fetal hemoglobin interfere with the HbA1C  assay. Heterozygous hemoglobin variants (HbS, HgC, etc)do  not significantly interfere with this assay.   However, presence of multiple variants may affect accuracy.                                       Patient's FSGs are uncontrolled due to hyperglycemia on current medication regimen.  - home regimen includes detemir, aspart  - hold home oral medications  - initiate on detemir 10u daily   - LDSSI with ACCUcheck ACHS  - Diabetic diet  -Hypoglycemia protocol PRN

## 2022-01-18 NOTE — PLAN OF CARE
Problem: Physical Therapy Goal  Goal: Physical Therapy Goal  Description: Goals to be met by: 22     Patient will increase functional independence with mobility by performin. Supine <> sit with Stand-by Assistance  2. Bed to chair transfer with Minimal Assistance using scoot transfer or slideboard/beasy board  3. Wheelchair propulsion x 50 feet with Stand-by Assistance using bilateral upper extremities    Outcome: Ongoing, Progressing     PT evaluation completed, note to follow. Pt completed supine<>sit with min A and scooted along EOB to the right with SBA, requiring increased time to complete. Recommending IPR upon d/c to continue to address pt's functional limitations and improve pt's independence, decreasing caregiver burden of care.

## 2022-01-18 NOTE — SUBJECTIVE & OBJECTIVE
Past Medical History:   Diagnosis Date    Anxiety     Chronic pain syndrome     CKD (chronic kidney disease), stage III     Depression     Diabetes mellitus     type 2    Diabetes mellitus, type 2     GERD (gastroesophageal reflux disease)     Hyperemesis 3/23/2021    Hyperlipemia     Hypertension     Hypokalemia 3/23/2021    Myocardial infarction 2010    minor-caused by stress per pt.    Osteomyelitis     Osteomyelitis of left foot 4/30/2021    PVD (peripheral vascular disease)     Vaginal delivery     x1       Past Surgical History:   Procedure Laterality Date    ABOVE-KNEE AMPUTATION Left 5/18/2021    Procedure: AMPUTATION, ABOVE KNEE;  Surgeon: Teddy Huber MD;  Location: [facility] OR [address] [place] [address];  Service: Vascular;  Laterality: Left;    Angiogram - Right Extremity Right 7/9/15    angiogram-left leg  10/6/15    ANGIOGRAPHY OF LOWER EXTREMITY Left 4/29/2021    Procedure: ANGIOGRAM, LOWER EXTREMITY;  Surgeon: Teddy Huber MD;  Location: [facility] OR [address] [place] [address];  Service: Vascular;  Laterality: Left;    BELOW KNEE AMPUTATION OF LOWER EXTREMITY Right 12/28/2021    Procedure: AMPUTATION, BELOW KNEE;  Surgeon: Kaitlyn Rojas MD;  Location: [facility];  Service: General;  Laterality: Right;    CATHETERIZATION OF BOTH LEFT AND RIGHT HEART N/A 12/18/2019    Procedure: CATHETERIZATION, HEART, BOTH LEFT AND RIGHT;  Surgeon: Que Fernando III, MD;  Location: [facility] CATH LAB;  Service: Cardiology;  Laterality: N/A;    CORONARY ANGIOGRAPHY N/A 12/18/2019    Procedure: ANGIOGRAM, CORONARY ARTERY;  Surgeon: Que Fernando III, MD;  Location: [facility] CATH LAB;  Service: Cardiology;  Laterality: N/A;    CORONARY ANGIOGRAPHY INCLUDING BYPASS GRAFTS WITH CATHETERIZATION OF LEFT HEART N/A 7/28/2020    Procedure: ANGIOGRAM, CORONARY, INCLUDING BYPASS GRAFT, WITH LEFT HEART CATHETERIZATION, 9 am;  Surgeon: Rachel Easley MD;  Location: [facility] CATH LAB;  Service: Cardiology;  Laterality: N/A;    CORONARY  ARTERY BYPASS GRAFT (CABG) N/A 1/14/2020    Procedure: CORONARY ARTERY BYPASS GRAFT (CABG) x 1     Off Pump;  Surgeon: Huang Altamirano MD;  Location: [facility] OR [address] [place] [address];  Service: Cardiovascular;  Laterality: N/A;    CREATION OF FEMORAL-TIBIAL ARTERY BYPASS Left 4/29/2021    Procedure: CREATION, BYPASS, ARTERIAL, FEMORAL TO ANTERIOR TIBIAL;  Surgeon: Teddy Huber MD;  [facility];  Service: Vascular;  Laterality: Left;    CREATION OF FEMOROPOPLITEAL ARTERIAL BYPASS USING GRAFT Left 8/18/2020    Procedure: CREATION, BYPASS, ARTERIAL, FEMORAL TO POPLITEAL, USING GRAFT, LEFT LOWER EXTREMITY;  Surgeon: Teddy Huber MD;  Location: [facility] OR;  Service: Vascular;  Laterality: Left;  REQUEST 7:15 A.M. START----COVID NEGATIVE ON 8/17 1ST CASE STARTKENYA DUEÑAS ON 8/7/2020 @ 942AM-  RN PREOP 8/12/2020   T/S-----CLEARED BY CARDS-------PENDING INSURANCE    DEBRIDEMENT OF FOOT Left 9/8/2020    Procedure: DEBRIDEMENT, FOOT;  Surgeon: Rosio Mayes DPM;  Location: [facility] OR;  Service: Podiatry;  Laterality: Left;  request neoxx .   RN Pre Op 9-4-2020, Covid negative on 9/5/20. C A    DEBRIDEMENT OF FOOT  3/4/2021    Procedure: DEBRIDEMENT, FOOT;  Surgeon: Teddy Huber MD;  Location: [facility] OR;  Service: Vascular;;    DEBRIDEMENT OF FOOT Left 3/9/2021    Procedure: DEBRIDEMENT, FOOT, bone biopsy;  Surgeon: Rosio Mayes DPM;  Location: [facility] OR;  Service: Podiatry;  Laterality: Left;  Request neoxx---COVID IN AM  REQUESTING NOON START  RN Phone Pre op.On Blood thinners Plavix and Eliquis.  Covid am of surgery. C A    DEBRIDEMENT OF FOOT Left 5/4/2021    Procedure: DEBRIDEMENT, FOOT;  Surgeon: Farooq [place] DPM;  [facility];  Service: Podiatry;  Laterality: Left;    INSERTION OF TUNNELED CENTRAL VENOUS HEMODIALYSIS CATHETER N/A 1/27/2020    Procedure: Insertion, Catheter, Central Venous, Hemodialysis;  Surgeon: ESTEBAN Gomez III, MD;  Location: [facility] CATH LAB;   Service: Peripheral Vascular;  Laterality: N/A;    PERCUTANEOUS TRANSLUMINAL ANGIOPLASTY N/A 3/4/2021    Procedure: PTA (ANGIOPLASTY, PERCUTANEOUS, TRANSLUMINAL);  Surgeon: Teddy Huber MD;  Location: [facility] OR;  Service: Vascular;  Laterality: N/A;    REMOVAL OF ARTERIOVENOUS GRAFT Left 5/27/2021    Procedure: REMOVAL, GRAFT, LEFT LOWER EXTREMITY, WOUND EXPLORATION;  Surgeon: Teddy Huber MD;  Location: [facility] OR [facility] FLR;  Service: Vascular;  Laterality: Left;    REMOVAL OF NAIL OF DIGIT Left 3/9/2021    Procedure: REMOVAL, NAIL, DIGIT;  Surgeon: Rosio Mayes DPM;  Location: [facility] OR;  Service: Podiatry;  Laterality: Left;    THROMBECTOMY Left 3/4/2021    Procedure: THROMBECTOMY, LEFT LOWER EXTREMITY BYPASS GRAFT, ANGIOGRAM, POSSIBLE INTERVENTION, POSSIBLE LEFT LOWER EXTREMITY BYPASS;  Surgeon: Teddy Huber MD;  Location: [facility] OR;  Service: Vascular;  Laterality: Left;    THROMBECTOMY Left 4/29/2021    Procedure: GRAFT THROMBECTOMY, LEFT LOWER EXTREMITY;  Surgeon: Teddy Huber MD;  Location: [facility] OR 2ND FLR;  Service: Vascular;  Laterality: Left;  14.5 min  1179.85 mGy  341.01 Gycm2  240 ml dye    THROMBECTOMY  10/22/2021    Procedure: THROMBECTOMY;  Surgeon: Saad Arenas MD;  Location: [facility] CATH LAB/EP;  Service: Cardiology;;       Review of patient's allergies indicates:   Allergen Reactions    Ciprofloxacin Itching    Contrast media      Kidney injury    Iodine      Kidney injury       Current Facility-Administered Medications on File Prior to Encounter   Medication    lactated ringers infusion    [DISCONTINUED] GENERIC EXTERNAL MEDICATION    [DISCONTINUED] GENERIC EXTERNAL MEDICATION     Current Outpatient Medications on File Prior to Encounter   Medication Sig    acetaminophen (TYLENOL) 500 MG tablet Take 2 tablets (1,000 mg total) by mouth every 8 (eight) hours as needed for Pain.    allopurinoL (ZYLOPRIM) 100 MG tablet Take 1 tablet (100 mg total) by mouth once  daily.    amoxicillin-clavulanate 875-125mg (AUGMENTIN) 875-125 mg per tablet Take 1 tablet by mouth 2 (two) times daily.    apixaban (ELIQUIS) 5 mg Tab Take 1 tablet (5 mg total) by mouth 2 (two) times daily.    atorvastatin (LIPITOR) 80 MG tablet Take 1 tablet (80 mg total) by mouth every evening.    blood-glucose meter,continuous (DEXCOM G6 ) Misc Use to check blood sugars 4 times/day    blood-glucose sensor (DEXCOM G6 SENSOR) Radha Apply one sensor to skin every 10 days    blood-glucose transmitter (DEXCOM G6 TRANSMITTER) Radha Replace transmitter every 3 months to use with dexcom sensor    clopidogreL (PLAVIX) 75 mg tablet Take 1 tablet (75 mg total) by mouth once daily.    furosemide (LASIX) 40 MG tablet Take 1 tablet (40 mg total) by mouth once daily.    HYDROcodone-acetaminophen (NORCO) 5-325 mg per tablet Take 1 tablet by mouth every 4 (four) hours as needed for Pain.    insulin aspart U-100 (NOVOLOG FLEXPEN U-100 INSULIN) 100 unit/mL (3 mL) InPn pen Inject 5 Units into the skin 3 (three) times daily with meals. If not eating, ok to hold    insulin detemir U-100 (LEVEMIR FLEXTOUCH) 100 unit/mL (3 mL) SubQ InPn pen Inject 8 Units into the skin once daily. (Patient taking differently: Inject 8 Units into the skin every evening.)    magnesium oxide (MAG-OX) 400 mg (241.3 mg magnesium) tablet Take 1 tablet (400 mg total) by mouth 2 (two) times daily.    melatonin (MELATIN) 3 mg tablet Take 2 tablets (6 mg total) by mouth nightly as needed for Insomnia.    mirtazapine (REMERON) 7.5 MG Tab Take 2 tablets (15 mg total) by mouth nightly.    multivit/folic acid/vit K1 (ONE-A-DAY WOMEN'S 50 PLUS ORAL) Take 1 tablet by mouth Daily.    pregabalin (LYRICA) 50 MG capsule Take 3 capsules (150 mg total) by mouth 3 (three) times daily.    sertraline (ZOLOFT) 50 MG tablet Take 1 tablet (50 mg total) by mouth once daily.    sodium bicarbonate 650 MG tablet Take 1 tablet (650 mg total) by mouth 2 (two)  times daily.    [DISCONTINUED] lancing device Misc 1 Device by Misc.(Non-Drug; Combo Route) route 2 (two) times daily with meals.     Family History     Problem Relation (Age of Onset)    Diabetes Mother, Father, Paternal Grandmother    Heart disease Maternal Grandmother    No Known Problems Maternal Grandfather, Paternal Grandfather        Tobacco Use    Smoking status: Former Smoker    Smokeless tobacco: Never Used   Substance and Sexual Activity    Alcohol use: No    Drug use: Yes     Types: Marijuana     Comment: occassional    Sexual activity: Yes     Partners: Male     Review of Systems   Constitutional: Positive for fatigue. Negative for chills, diaphoresis and fever.   HENT: Negative for hearing loss and sore throat.    Eyes: Negative for visual disturbance.   Respiratory: Negative for cough and shortness of breath.    Cardiovascular: Negative for chest pain and leg swelling.   Gastrointestinal: Negative for abdominal pain, diarrhea, nausea and vomiting.   Genitourinary: Negative for difficulty urinating and flank pain.   Musculoskeletal: Negative for back pain.   Skin: Positive for wound. Negative for rash.   Neurological: Negative for dizziness, weakness and headaches.   Psychiatric/Behavioral: Negative for agitation and confusion.     Objective:     Vital Signs (Most Recent):  Temp: 98.2 °F (36.8 °C) (01/17/22 1937)  Pulse: 93 (01/17/22 1937)  Resp: 20 (01/17/22 1937)  BP: (!) 194/96 (01/17/22 1937)  SpO2: 99 % (01/17/22 1937) Vital Signs (24h Range):  Temp:  [98.2 °F (36.8 °C)-99.1 °F (37.3 °C)] 98.2 °F (36.8 °C)  Pulse:  [] 93  Resp:  [17-20] 20  SpO2:  [98 %-100 %] 99 %  BP: (153-230)/() 194/96     Weight: 70.7 kg (155 lb 13.8 oz)  Body mass index is 25.94 kg/m².    Physical Exam  Vitals and nursing note reviewed.   Constitutional:       General: She is not in acute distress.     Appearance: She is obese. She is not ill-appearing.   HENT:      Head: Normocephalic and atraumatic.       Mouth/Throat:      Mouth: Mucous membranes are moist.   Eyes:      Extraocular Movements: Extraocular movements intact.      Pupils: Pupils are equal, round, and reactive to light.   Cardiovascular:      Rate and Rhythm: Normal rate and regular rhythm.      Pulses: Normal pulses.      Heart sounds: Normal heart sounds. No murmur heard.  No friction rub. No gallop.    Pulmonary:      Effort: Pulmonary effort is normal. No respiratory distress.      Breath sounds: No wheezing or rhonchi.   Abdominal:      General: Bowel sounds are normal. There is no distension.      Palpations: Abdomen is soft.      Tenderness: There is no abdominal tenderness. There is no guarding.   Musculoskeletal:         General: No swelling.      Cervical back: Normal range of motion and neck supple.      Right Lower Extremity: Right leg is amputated below knee.      Left Lower Extremity: Left leg is amputated below knee.   Feet:      Right foot:      Skin integrity: Erythema and warmth present.      Comments: S/p right BKA 12/28.  Staples intact. Wound dehisced, primary to lateral surface.  Serous colored drainage noted throughout bandages with small amount of active drainage from wound.  Eschar noted primarily to lateral surface. Mild surrounding erythema.  Warmth to touch. See images below  Left BKA at baseline, uncomplicated.  No open wounds.  Skin:     General: Skin is warm and dry.      Capillary Refill: Capillary refill takes less than 2 seconds.      Findings: No bruising, erythema or rash.   Neurological:      General: No focal deficit present.      Mental Status: She is alert and oriented to person, place, and time.   Psychiatric:         Mood and Affect: Mood normal.         Behavior: Behavior normal. Behavior is cooperative.                   CRANIAL NERVES     CN III, IV, VI   Pupils are equal, round, and reactive to light.       Significant Labs:   All pertinent labs within the past 24 hours have been reviewed.  A1C:   Recent Labs    Lab 12/03/21  1730   HGBA1C 9.6*     Bilirubin:   Recent Labs   Lab 12/21/21  1702 01/11/22  1815 01/17/22  1547   BILITOT 0.2 0.2 0.2     BMP:   Recent Labs   Lab 01/17/22  1547   *      K 4.0      CO2 20*   BUN 24*   CREATININE 1.2   CALCIUM 8.1*     CBC:   Recent Labs   Lab 01/17/22  1547   WBC 10.66   HGB 8.7*   HCT 27.4*        CMP:   Recent Labs   Lab 01/17/22  1547      K 4.0      CO2 20*   *   BUN 24*   CREATININE 1.2   CALCIUM 8.1*   PROT 6.6   ALBUMIN 1.7*   BILITOT 0.2   ALKPHOS 97   AST 20   ALT 17   ANIONGAP 14   EGFRNONAA 51*     Lactic Acid:   Recent Labs   Lab 01/17/22  1547   LACTATE 1.5     POCT Glucose:   Recent Labs   Lab 01/17/22  1745   POCTGLUCOSE 234*       Significant Imaging: I have reviewed all pertinent imaging results/findings within the past 24 hours.

## 2022-01-18 NOTE — ASSESSMENT & PLAN NOTE
Last LDL was   Lab Results   Component Value Date    LDLCALC 177.6 (H) 12/03/2021      Patient is chronically on statin.will continue for now.   Monitor clinically.  Lipid panel in am

## 2022-01-19 LAB
ANION GAP SERPL CALC-SCNC: 12 MMOL/L (ref 8–16)
BASOPHILS # BLD AUTO: 0.05 K/UL (ref 0–0.2)
BASOPHILS NFR BLD: 0.6 % (ref 0–1.9)
BUN SERPL-MCNC: 24 MG/DL (ref 6–20)
CALCIUM SERPL-MCNC: 7.9 MG/DL (ref 8.7–10.5)
CHLORIDE SERPL-SCNC: 105 MMOL/L (ref 95–110)
CO2 SERPL-SCNC: 20 MMOL/L (ref 23–29)
CREAT SERPL-MCNC: 1.1 MG/DL (ref 0.5–1.4)
DIFFERENTIAL METHOD: ABNORMAL
EOSINOPHIL # BLD AUTO: 0.3 K/UL (ref 0–0.5)
EOSINOPHIL NFR BLD: 3.4 % (ref 0–8)
ERYTHROCYTE [DISTWIDTH] IN BLOOD BY AUTOMATED COUNT: 14.7 % (ref 11.5–14.5)
EST. GFR  (AFRICAN AMERICAN): >60 ML/MIN/1.73 M^2
EST. GFR  (NON AFRICAN AMERICAN): 57 ML/MIN/1.73 M^2
GLUCOSE SERPL-MCNC: 206 MG/DL (ref 70–110)
HCT VFR BLD AUTO: 26.5 % (ref 37–48.5)
HGB BLD-MCNC: 8.2 G/DL (ref 12–16)
IMM GRANULOCYTES # BLD AUTO: 0.02 K/UL (ref 0–0.04)
IMM GRANULOCYTES NFR BLD AUTO: 0.3 % (ref 0–0.5)
LYMPHOCYTES # BLD AUTO: 1.9 K/UL (ref 1–4.8)
LYMPHOCYTES NFR BLD: 24.5 % (ref 18–48)
MCH RBC QN AUTO: 29.7 PG (ref 27–31)
MCHC RBC AUTO-ENTMCNC: 30.9 G/DL (ref 32–36)
MCV RBC AUTO: 96 FL (ref 82–98)
MONOCYTES # BLD AUTO: 0.7 K/UL (ref 0.3–1)
MONOCYTES NFR BLD: 8.9 % (ref 4–15)
NEUTROPHILS # BLD AUTO: 4.9 K/UL (ref 1.8–7.7)
NEUTROPHILS NFR BLD: 62.3 % (ref 38–73)
NRBC BLD-RTO: 0 /100 WBC
PLATELET # BLD AUTO: 353 K/UL (ref 150–450)
PMV BLD AUTO: 11.4 FL (ref 9.2–12.9)
POCT GLUCOSE: 166 MG/DL (ref 70–110)
POCT GLUCOSE: 179 MG/DL (ref 70–110)
POCT GLUCOSE: 191 MG/DL (ref 70–110)
POCT GLUCOSE: 208 MG/DL (ref 70–110)
POCT GLUCOSE: 240 MG/DL (ref 70–110)
POTASSIUM SERPL-SCNC: 3.5 MMOL/L (ref 3.5–5.1)
RBC # BLD AUTO: 2.76 M/UL (ref 4–5.4)
SODIUM SERPL-SCNC: 137 MMOL/L (ref 136–145)
VANCOMYCIN TROUGH SERPL-MCNC: 29.6 UG/ML (ref 10–22)
WBC # BLD AUTO: 7.83 K/UL (ref 3.9–12.7)

## 2022-01-19 PROCEDURE — 36415 COLL VENOUS BLD VENIPUNCTURE: CPT | Mod: HCNC | Performed by: FAMILY MEDICINE

## 2022-01-19 PROCEDURE — G0378 HOSPITAL OBSERVATION PER HR: HCPCS | Mod: HCNC

## 2022-01-19 PROCEDURE — 99214 PR OFFICE/OUTPT VISIT, EST, LEVL IV, 30-39 MIN: ICD-10-PCS | Mod: HCNC,,, | Performed by: INTERNAL MEDICINE

## 2022-01-19 PROCEDURE — 94761 N-INVAS EAR/PLS OXIMETRY MLT: CPT | Mod: HCNC

## 2022-01-19 PROCEDURE — 97110 THERAPEUTIC EXERCISES: CPT | Mod: HCNC

## 2022-01-19 PROCEDURE — 63600175 PHARM REV CODE 636 W HCPCS: Mod: HCNC | Performed by: PHYSICIAN ASSISTANT

## 2022-01-19 PROCEDURE — 87040 BLOOD CULTURE FOR BACTERIA: CPT | Mod: 59,HCNC | Performed by: PHYSICIAN ASSISTANT

## 2022-01-19 PROCEDURE — 25000003 PHARM REV CODE 250: Mod: HCNC

## 2022-01-19 PROCEDURE — 97530 THERAPEUTIC ACTIVITIES: CPT | Mod: HCNC

## 2022-01-19 PROCEDURE — 36415 COLL VENOUS BLD VENIPUNCTURE: CPT | Mod: HCNC | Performed by: PHYSICIAN ASSISTANT

## 2022-01-19 PROCEDURE — 99214 OFFICE O/P EST MOD 30 MIN: CPT | Mod: HCNC,,, | Performed by: INTERNAL MEDICINE

## 2022-01-19 PROCEDURE — 85025 COMPLETE CBC W/AUTO DIFF WBC: CPT | Mod: HCNC | Performed by: PHYSICIAN ASSISTANT

## 2022-01-19 PROCEDURE — 80202 ASSAY OF VANCOMYCIN: CPT | Mod: HCNC | Performed by: FAMILY MEDICINE

## 2022-01-19 PROCEDURE — 80048 BASIC METABOLIC PNL TOTAL CA: CPT | Mod: HCNC | Performed by: PHYSICIAN ASSISTANT

## 2022-01-19 PROCEDURE — 63600175 PHARM REV CODE 636 W HCPCS: Mod: HCNC

## 2022-01-19 RX ORDER — CEFEPIME HYDROCHLORIDE 1 G/50ML
1 INJECTION, SOLUTION INTRAVENOUS
Status: DISCONTINUED | OUTPATIENT
Start: 2022-01-19 | End: 2022-01-21

## 2022-01-19 RX ADMIN — APIXABAN 5 MG: 5 TABLET, FILM COATED ORAL at 08:01

## 2022-01-19 RX ADMIN — MELATONIN TAB 3 MG 6 MG: 3 TAB at 10:01

## 2022-01-19 RX ADMIN — CEFAZOLIN SODIUM 1 G: 1 SOLUTION INTRAVENOUS at 05:01

## 2022-01-19 RX ADMIN — HYDROCODONE BITARTRATE AND ACETAMINOPHEN 1 TABLET: 5; 325 TABLET ORAL at 08:01

## 2022-01-19 RX ADMIN — APIXABAN 5 MG: 5 TABLET, FILM COATED ORAL at 09:01

## 2022-01-19 RX ADMIN — INSULIN ASPART 2 UNITS: 100 INJECTION, SOLUTION INTRAVENOUS; SUBCUTANEOUS at 05:01

## 2022-01-19 RX ADMIN — CLOPIDOGREL 75 MG: 75 TABLET, FILM COATED ORAL at 08:01

## 2022-01-19 RX ADMIN — PREGABALIN 150 MG: 75 CAPSULE ORAL at 05:01

## 2022-01-19 RX ADMIN — INSULIN ASPART 2 UNITS: 100 INJECTION, SOLUTION INTRAVENOUS; SUBCUTANEOUS at 12:01

## 2022-01-19 RX ADMIN — CEFAZOLIN SODIUM 1 G: 1 SOLUTION INTRAVENOUS at 01:01

## 2022-01-19 RX ADMIN — SODIUM BICARBONATE 650 MG: 650 TABLET ORAL at 09:01

## 2022-01-19 RX ADMIN — FUROSEMIDE 40 MG: 40 TABLET ORAL at 08:01

## 2022-01-19 RX ADMIN — ATORVASTATIN CALCIUM 80 MG: 40 TABLET, FILM COATED ORAL at 09:01

## 2022-01-19 RX ADMIN — CEFEPIME HYDROCHLORIDE 1 G: 1 INJECTION, SOLUTION INTRAVENOUS at 09:01

## 2022-01-19 RX ADMIN — PREGABALIN 150 MG: 75 CAPSULE ORAL at 10:01

## 2022-01-19 RX ADMIN — SODIUM BICARBONATE 650 MG: 650 TABLET ORAL at 08:01

## 2022-01-19 RX ADMIN — COLLAGENASE SANTYL: 250 OINTMENT TOPICAL at 08:01

## 2022-01-19 RX ADMIN — ALLOPURINOL 100 MG: 100 TABLET ORAL at 08:01

## 2022-01-19 RX ADMIN — SERTRALINE HYDROCHLORIDE 50 MG: 50 TABLET ORAL at 08:01

## 2022-01-19 RX ADMIN — INSULIN ASPART 2 UNITS: 100 INJECTION, SOLUTION INTRAVENOUS; SUBCUTANEOUS at 08:01

## 2022-01-19 RX ADMIN — MIRTAZAPINE 15 MG: 7.5 TABLET ORAL at 09:01

## 2022-01-19 RX ADMIN — HYDROCODONE BITARTRATE AND ACETAMINOPHEN 1 TABLET: 5; 325 TABLET ORAL at 05:01

## 2022-01-19 RX ADMIN — PREGABALIN 150 MG: 75 CAPSULE ORAL at 08:01

## 2022-01-19 NOTE — PLAN OF CARE
Progressing toward goals, cont POC      Problem: Occupational Therapy Goal  Goal: Occupational Therapy Goal  Description: Goals to be met by: 2/18     Patient will increase functional independence with ADLs by performing:    UE Dressing with Stand-by Assistance.  LE Dressing with Minimal Assistance.  Grooming while seated with Modified [place].  Toilet transfer to bedside commode with Moderate Assistance.  Upper extremity exercise program x10 reps per handout, with independence.    Outcome: Ongoing, Progressing

## 2022-01-19 NOTE — PROGRESS NOTES
[facility] Medicine  Progress Note    Patient Name: Suyapa Connelly  MRN: [id number]  Patient Class: OP- Observation   Admission Date: 1/17/2022  Length of Stay: 0 days  Attending Physician: Obinna Marr DO  Primary Care Provider: Magen Christensen MD        Subjective:     Principal Problem:Infected wound        HPI:  Suyapa Connelly is a 57 y/o female with PMHx CKD3, DMII, GERD, HLD, HTN, osteomyelitis, PVD, depression, and anxiety presents to ED with  with continue concern for wound infection.  Patient underwent right BKA by Dr. Rojas on 12/28 then discharged to rehab facility on 1/10.  Rehab facility staff became concerned for possible wound infection and patient was sent back to ED on 1/11.  Lab work was obtained, and images of wound were reviewed with Dr. Rojas, who recommended continued outpatient management.  At discharge patient was prescribed Augmentin, which she has continued taking as instructed.   at bedside is concern for progressively worsening appearance of wound, along with continued drainage.  Patient reporting worsened pain today described as sharp, worse with touch, radiating towards right knee, severity 8/10.  No fevers, chills, nausea, vomiting, chest pain, cough, shortness of breath, abdominal pain, urinary or bowel complications.  She does admit to not taking her BP medication today.  No other acute complaints at this time. Will admit to hospital medicine for observation services. General surgery consulted.       Overview/Hospital Course:  Pt placed in observation for evaluation of wound infection.  Pt s/p R BKA with Dr. Rojas on 12/28/21.  Vanc and cefazolin initiated.  Wound culture presumptive pseudomonas species cefazolin discontinued and changed to cefepime.  UC candida and BC 1 of 2 bottles coag-neg staph suspected contaminant.  Gen Surg consulted and recommended santyl, xeroform, 4 x 4, kerlix and ace bandage daily.    PT/OT recommended rehab  facility.  WC consulted and unstageable sacral decub.  Recommended waffle overlay and triad ointment to L buttocks BID and prn incontinent episode.      Interval History:  no acute events overnight, no new complaints.  Updated patient on culture results.    Review of Systems   Constitutional: Negative for chills, diaphoresis, fatigue and fever.   HENT: Negative for hearing loss and sore throat.    Eyes: Negative for visual disturbance.   Respiratory: Negative for cough and shortness of breath.    Cardiovascular: Negative for chest pain and leg swelling.   Gastrointestinal: Negative for abdominal pain, diarrhea, nausea and vomiting.   Genitourinary: Negative for difficulty urinating and flank pain.   Musculoskeletal: Negative for back pain.   Skin: Positive for wound. Negative for rash.   Neurological: Negative for dizziness, weakness and headaches.   Psychiatric/Behavioral: Negative for agitation and confusion.     Objective:     Vital Signs (Most Recent):  Temp: 98.4 °F (36.9 °C) (01/19/22 1218)  Pulse: 88 (01/19/22 1218)  Resp: 18 (01/19/22 1218)  BP: (!) 172/82 (01/19/22 1218)  SpO2: 99 % (01/19/22 1218) Vital Signs (24h Range):  Temp:  [98 °F (36.7 °C)-98.5 °F (36.9 °C)] 98.4 °F (36.9 °C)  Pulse:  [88-99] 88  Resp:  [18] 18  SpO2:  [97 %-100 %] 99 %  BP: (133-191)/(63-96) 172/82     Weight: 76.2 kg (167 lb 15.9 oz)  Body mass index is 27.96 kg/m².    Intake/Output Summary (Last 24 hours) at 1/19/2022 1326  Last data filed at 1/18/2022 1700  Gross per 24 hour   Intake 120 ml   Output --   Net 120 ml      Physical Exam  Vitals and nursing note reviewed.   Constitutional:       General: She is not in acute distress.     Appearance: She is obese. She is not ill-appearing.   HENT:      Head: Normocephalic and atraumatic.      Mouth/Throat:      Mouth: Mucous membranes are moist.   Eyes:      Extraocular Movements: Extraocular movements intact.      Pupils: Pupils are equal, round, and reactive to light.    Cardiovascular:      Rate and Rhythm: Normal rate and regular rhythm.      Pulses: Normal pulses.      Heart sounds: Normal heart sounds.   Pulmonary:      Effort: Pulmonary effort is normal.   Abdominal:      General: Bowel sounds are normal.      Palpations: Abdomen is soft.   Musculoskeletal:         General: No swelling.      Cervical back: Normal range of motion and neck supple.      Right Lower Extremity: Right leg is amputated below knee.      Left Lower Extremity: Left leg is amputated below knee.   Feet:      Right foot:      Skin integrity: Erythema and warmth present.      Comments: S/p right BKA 12/28.  Staples intact. Wound dehisced, primary to lateral surface.  Serous colored drainage noted throughout bandages with small amount of active drainage from wound.  Eschar noted primarily to lateral surface. Mild surrounding erythema.  Warmth to touch. See images below  Left BKA at baseline, uncomplicated.  No open wounds.  Skin:     General: Skin is warm and dry.      Capillary Refill: Capillary refill takes less than 2 seconds.      Comments: unstageable sacral decubitus   Neurological:      General: No focal deficit present.      Mental Status: She is alert and oriented to person, place, and time.   Psychiatric:         Mood and Affect: Mood normal.         Behavior: Behavior normal. Behavior is cooperative.         Significant Labs: All pertinent labs within the past 24 hours have been reviewed.    Significant Imaging: I have reviewed all pertinent imaging results/findings within the past 24 hours.      Assessment/Plan:      * Infected wound  S/p right bka with Dr. Rojas on 12/28/21  Presents to ED with concerns for wound infection  BKA site with dehisced wound  Pending cultures  Received zosyn x1  Vancomycin and cefazolin initiated, cultures presumptive pseudomonas.  Cefazolin changed to cefepime.  General surgery consulted for possible debridement: check cultures , apply santyl , xeroform , 4 x 4 ,  kerlix , and ace bandage daily, iv antibiotics  ID consulted for recommendations on antibiotic therapy  PT/OT      Decubitus ulcer of sacral region, unstageable  Wound Care assistance appreciated  Recommended waffle overlay and triad ointment to L buttocks BID and prn incontinent episode.    S/P BKA (below knee amputation) unilateral, right  See above      Phantom limb syndrome with pain  Resume home lyrica  Norco q4hr prn     Type 2 diabetes mellitus with diabetic nephropathy, with long-term current use of insulin  Hemoglobin A1C   Date Value Ref Range Status   12/03/2021 9.6 (H) 4.0 - 5.6 % Final     Comment:     ADA Screening Guidelines:  5.7-6.4%  Consistent with prediabetes  >or=6.5%  Consistent with diabetes    High levels of fetal hemoglobin interfere with the HbA1C  assay. Heterozygous hemoglobin variants (HbS, HgC, etc)do  not significantly interfere with this assay.   However, presence of multiple variants may affect accuracy.                                       Patient's FSGs are uncontrolled due to hyperglycemia on current medication regimen.  - home regimen includes detemir, aspart  - hold home oral medications  - increased scheduled doses: detemir 10U qhs, aspart 2U tid plus scale will increase to 4U tid  - LDSSI with ACCUcheck ACHS  - Diabetic diet  -Hypoglycemia protocol PRN        Paroxysmal atrial fibrillation  Patient with paroxymal afib  Resumed home OAC, rate controlled  Monitor on telemetry          Mixed hyperlipidemia  Last LDL was   Lab Results   Component Value Date    LDLCALC 177.6 (H) 12/03/2021      Patient is chronically on statin.will continue for now.   Monitor clinically.  Lipid panel in am        MDD (major depressive disorder), recurrent episode, moderate  Resume zoloft      Peripheral vascular disease  Resume statin       Chronic anemia  Stable and at baseline        VTE Risk Mitigation (From admission, onward)         Ordered     apixaban tablet 5 mg  2 times daily          01/17/22 1920     IP VTE HIGH RISK PATIENT  Once         01/17/22 1658     Place sequential compression device  Until discontinued         01/17/22 1658                Discharge Planning   DEVAN:      Code Status: Full Code   Is the patient medically ready for discharge?:     Reason for patient still in hospital (select all that apply): Patient trending condition, Consult recommendations and Pending disposition  Discharge Plan A: Rehab                  Shanice Duvall PA-C  Department of [facility] Medicine   [facility]

## 2022-01-19 NOTE — ASSESSMENT & PLAN NOTE
Hemoglobin A1C   Date Value Ref Range Status   12/03/2021 9.6 (H) 4.0 - 5.6 % Final     Comment:     ADA Screening Guidelines:  5.7-6.4%  Consistent with prediabetes  >or=6.5%  Consistent with diabetes    High levels of fetal hemoglobin interfere with the HbA1C  assay. Heterozygous hemoglobin variants (HbS, HgC, etc)do  not significantly interfere with this assay.   However, presence of multiple variants may affect accuracy.                                       Patient's FSGs are uncontrolled due to hyperglycemia on current medication regimen.  - home regimen includes detemir, aspart  - hold home oral medications  - increased scheduled doses: detemir 10U qhs, aspart 2U tid plus scale will increase to 4U tid  - LDSSI with ACCUcheck ACHS  - Diabetic diet  -Hypoglycemia protocol PRN

## 2022-01-19 NOTE — ASSESSMENT & PLAN NOTE
57 y/o female with PMHx CKD3, DMII, GERD, HLD, HTN, osteomyelitis, PVD, depression, and anxiety presents to ED with  with continue concern for wound infection.       -await final cxs  -continue vanco and cefepime

## 2022-01-19 NOTE — PLAN OF CARE
Problem: Physical Therapy Goal  Goal: Physical Therapy Goal  Description: Goals to be met by: 22     Patient will increase functional independence with mobility by performin. Supine <> sit with Stand-by Assistance  2. Bed to chair transfer with Minimal Assistance using scoot transfer or slideboard/beasy board  3. Wheelchair propulsion x 50 feet with Stand-by Assistance using bilateral upper extremities    Outcome: Ongoing, Progressing     Pt deferred transfer attempts this date due to fear of worsening sacral wound but is very motivated and agreeable to participate in therapy session. Completed core exercises, dynamic balance exercises, and EOB/bed mobility this date.   Despite patient's co-morbidities, patient was living in community setting functioning at min A level for ADLs, and mobility prior to this event.  There is an expectation of returning to prior level of function to maintain independence thus avoiding readmission.  Patient's clinical condition meets full Inpatient Rehab (IPR) criteria; including the ability to actively participate in 3 hours of therapy.

## 2022-01-19 NOTE — HPI
55 y/o female with PMHx CKD3, DMII, GERD, HLD, HTN, osteomyelitis, PVD, depression, and anxiety presents to ED with  with continue concern for wound infection.  Patient underwent right BKA by Dr. Rojas on 12/28 then discharged to rehab facility on 1/10.  Rehab facility staff became concerned for possible wound infection and patient was sent back to ED on 1/11.  Lab work was obtained, and images of wound were reviewed with Dr. Rojas, who recommended continued outpatient management.  At discharge patient was prescribed Augmentin, which she has continued taking as instructed.   at bedside is concern for progressively worsening appearance of wound, along with continued drainage.  Patient reporting worsened pain today described as sharp, worse with touch, radiating towards right knee, severity 8/10.  No fevers, chills, nausea, vomiting, chest pain, cough, shortness of breath, abdominal pain, urinary or bowel complications      ID is consulted 1/19/22 for infection of BKA site with wound cxs growing presumptive pseudomonas  1/20/22 - new gram negative favio on culture in addition to the pseudomonas; continue vanco and cefepime  1/21/22 - the right knee culture from 1/17 positive for pseudomonas and Klebsiella ESBL - changed cefepime to meropenem and continued vancomycin  1/22/22 - right leg wound from 1/20/22 positive for pseudomonas and GNR  1/23/22 - right leg wound from 1/20/22 positive for pseudomonas and klebsiella ESBL; vancomycin stopped  1/24/22 - right BKA stump revision in OR - no cultures taken

## 2022-01-19 NOTE — SUBJECTIVE & OBJECTIVE
Interval History:  no acute events overnight, no new complaints.  Updated patient on culture results.    Review of Systems   Constitutional: Negative for chills, diaphoresis, fatigue and fever.   HENT: Negative for hearing loss and sore throat.    Eyes: Negative for visual disturbance.   Respiratory: Negative for cough and shortness of breath.    Cardiovascular: Negative for chest pain and leg swelling.   Gastrointestinal: Negative for abdominal pain, diarrhea, nausea and vomiting.   Genitourinary: Negative for difficulty urinating and flank pain.   Musculoskeletal: Negative for back pain.   Skin: Positive for wound. Negative for rash.   Neurological: Negative for dizziness, weakness and headaches.   Psychiatric/Behavioral: Negative for agitation and confusion.     Objective:     Vital Signs (Most Recent):  Temp: 98.4 °F (36.9 °C) (01/19/22 1218)  Pulse: 88 (01/19/22 1218)  Resp: 18 (01/19/22 1218)  BP: (!) 172/82 (01/19/22 1218)  SpO2: 99 % (01/19/22 1218) Vital Signs (24h Range):  Temp:  [98 °F (36.7 °C)-98.5 °F (36.9 °C)] 98.4 °F (36.9 °C)  Pulse:  [88-99] 88  Resp:  [18] 18  SpO2:  [97 %-100 %] 99 %  BP: (133-191)/(63-96) 172/82     Weight: 76.2 kg (167 lb 15.9 oz)  Body mass index is 27.96 kg/m².    Intake/Output Summary (Last 24 hours) at 1/19/2022 1326  Last data filed at 1/18/2022 1700  Gross per 24 hour   Intake 120 ml   Output --   Net 120 ml      Physical Exam  Vitals and nursing note reviewed.   Constitutional:       General: She is not in acute distress.     Appearance: She is obese. She is not ill-appearing.   HENT:      Head: Normocephalic and atraumatic.      Mouth/Throat:      Mouth: Mucous membranes are moist.   Eyes:      Extraocular Movements: Extraocular movements intact.      Pupils: Pupils are equal, round, and reactive to light.   Cardiovascular:      Rate and Rhythm: Normal rate and regular rhythm.      Pulses: Normal pulses.      Heart sounds: Normal heart sounds.   Pulmonary:      Effort:  Pulmonary effort is normal.   Abdominal:      General: Bowel sounds are normal.      Palpations: Abdomen is soft.   Musculoskeletal:         General: No swelling.      Cervical back: Normal range of motion and neck supple.      Right Lower Extremity: Right leg is amputated below knee.      Left Lower Extremity: Left leg is amputated below knee.   Feet:      Right foot:      Skin integrity: Erythema and warmth present.      Comments: S/p right BKA 12/28.  Staples intact. Wound dehisced, primary to lateral surface.  Serous colored drainage noted throughout bandages with small amount of active drainage from wound.  Eschar noted primarily to lateral surface. Mild surrounding erythema.  Warmth to touch. See images below  Left BKA at baseline, uncomplicated.  No open wounds.  Skin:     General: Skin is warm and dry.      Capillary Refill: Capillary refill takes less than 2 seconds.      Comments: unstageable sacral decubitus   Neurological:      General: No focal deficit present.      Mental Status: She is alert and oriented to person, place, and time.   Psychiatric:         Mood and Affect: Mood normal.         Behavior: Behavior normal. Behavior is cooperative.         Significant Labs: All pertinent labs within the past 24 hours have been reviewed.    Significant Imaging: I have reviewed all pertinent imaging results/findings within the past 24 hours.

## 2022-01-19 NOTE — ASSESSMENT & PLAN NOTE
S/p right bka with Dr. Rojas on 12/28/21  Presents to ED with concerns for wound infection  BKA site with dehisced wound  Pending cultures  Received zosyn x1  Vancomycin and cefazolin initiated, cultures presumptive pseudomonas.  Cefazolin changed to cefepime.  General surgery consulted for possible debridement: check cultures , apply santyl , xeroform , 4 x 4 , kerlix , and ace bandage daily, iv antibiotics  ID consulted for recommendations on antibiotic therapy  PT/OT

## 2022-01-19 NOTE — PT/OT/SLP PROGRESS
Physical Therapy Treatment    Patient Name:  Suyapa Connelly   MRN:  [id number]    Recommendations:     Discharge Recommendations:  rehabilitation facility   Discharge Equipment Recommendations:  (TBD)   Barriers to Discharge: increased caregiver burden of care    Assessment:     Suyapa Connelly is a 56 y.o. female admitted with a medical diagnosis of Infected wound.  She presents with the following impairments/functional limitations:  weakness,impaired endurance,impaired sensation,impaired self care skills,impaired functional mobilty,gait instability,impaired balance,impaired cognition,decreased coordination,decreased upper extremity function,decreased lower extremity function,pain,decreased ROM,impaired skin. Pt deferred transfer attempts this date due to fear of worsening sacral wound but is very motivated and agreeable to participate in therapy session. Completed core exercises, dynamic balance exercises, and EOB/bed mobility this date.   Despite patient's co-morbidities, patient was living in community setting functioning at min A level for ADLs, and mobility prior to this event.  There is an expectation of returning to prior level of function to maintain independence thus avoiding readmission.  Patient's clinical condition meets full Inpatient Rehab (IPR) criteria; including the ability to actively participate in 3 hours of therapy.     Rehab Prognosis: Good; patient would benefit from acute skilled PT services to address these deficits and reach maximum level of function.    Recent Surgery: * No surgery found *      Plan:     During this hospitalization, patient to be seen 5 x/week to address the identified rehab impairments via therapeutic activities,therapeutic exercises,neuromuscular re-education,wheelchair management/training and progress toward the following goals:    · Plan of Care Expires:  02/18/22    Subjective     Chief Complaint: pain at incision of R residual limb  Patient/Family Comments/goals:  pt's  present during session, is grateful to therapists for providing explanations for exercises/activity and reporting he video records parts of pt's therapy session to know what to complete once he and the pt d/c home.  Pain/Comfort:  · Pain Rating 1: 8/10  · Location - Side 1: Right  · Location 1:  (residual limb incision)  · Pain Addressed 1: Pre-medicate for activity,Reposition,Distraction,Cessation of Activity  · Pain Rating Post-Intervention 1: 6/10      Objective:     Communicated with nurse Lyles prior to session.  Patient found HOB elevated with bed alarm upon PT entry to room.     General Precautions: Standard, fall   Orthopedic Precautions:N/A   Braces: N/A  Respiratory Status: Room air     Functional Mobility:  · Bed Mobility:     · Scooting: stand by assistance  · Supine to Sit: stand by assistance and HOB maximally elevated  · Sit to Supine: minimum assistance and to raise RLE into bed      AM-PAC 6 CLICK MOBILITY  Turning over in bed (including adjusting bedclothes, sheets and blankets)?: 3  Sitting down on and standing up from a chair with arms (e.g., wheelchair, bedside commode, etc.): 1  Moving from lying on back to sitting on the side of the bed?: 3  Moving to and from a bed to a chair (including a wheelchair)?: 1  Need to walk in hospital room?: 1  Climbing 3-5 steps with a railing?: 1  Basic Mobility Total Score: 10       Therapeutic Activities and Exercises:  Educated pt on role of PT and pt agreeable to participate in therapy session.  Gave pt the option if she was interested in attempting a beasy board transfer this date but pt deferred, reporting she wanted to wait 1-2 more days because of the wound on her sacrum.  Pt very motivated and wanting to participate in EOB activity and exercises.  Transitioned to sit EOB with additional time for scooting.  Instructed in RLE exercises, pt able to rotate hip and assist minimally with R hip flexion (trace activation, unable to raise hip off  bed), and unable to extend R knee therefore PROM completed - 5 reps each with rest breaks. VCs for breathing with exercises.  Educated on upright posture, completed scapular retraction 5 x 5 sec holds.  Instructed in core exercises including lateral leaning each direction then return to upright - 5 reps each direction.  Completed posterior trunk extension then return to upright. 5 reps completed with BUE support on bed then 5 reps completed without UE support.  Pt endorsing increase in R residual limb pain. Completed scoots to the right towards HOB then returned to bed.    Patient left HOB elevated with all lines intact, call button in reach, bed alarm on and pt's  and mother-in-law present..    GOALS:   Multidisciplinary Problems     Physical Therapy Goals        Problem: Physical Therapy Goal    Goal Priority Disciplines Outcome Goal Variances Interventions   Physical Therapy Goal     PT, PT/OT Ongoing, Progressing     Description: Goals to be met by: 22     Patient will increase functional independence with mobility by performin. Supine <> sit with Stand-by Assistance  2. Bed to chair transfer with Minimal Assistance using scoot transfer or slideboard/beasy board  3. Wheelchair propulsion x 50 feet with Stand-by Assistance using bilateral upper extremities                     Time Tracking:     PT Received On: 22  PT Start Time: 1047     PT Stop Time: 1132  PT Total Time (min): 45 min     Billable Minutes: Therapeutic Activity 15 and Therapeutic Exercise 30    Treatment Type: Treatment  PT/PTA: PT     PTA Visit Number: 0     2022

## 2022-01-19 NOTE — PT/OT/SLP PROGRESS
Occupational Therapy   Treatment    Name: Suyapa Connelly  MRN: [id number]  Admitting Diagnosis:  Infected wound       Recommendations:     Discharge Recommendations: rehabilitation facility  Discharge Equipment Recommendations:  other (see comments) (TBD)  Barriers to discharge:  None    Assessment:     Suyapa Connelly is a 56 y.o. female with a medical diagnosis of Infected wound.  She presents with performance deficits affecting function are weakness,impaired endurance,impaired sensation,impaired self care skills,impaired functional mobilty,gait instability,impaired balance,decreased lower extremity function,decreased upper extremity function,pain,decreased safety awareness,impaired fine motor,impaired skin.     Rehab Prognosis:  Good; patient would benefit from acute skilled OT services to address these deficits and reach maximum level of function.       Plan:     Patient to be seen 5 x/week to address the above listed problems via self-care/home management,therapeutic activities,therapeutic exercises  · Plan of Care Expires: 02/18/22  · Plan of Care Reviewed with: patient,spouse    Subjective     Pain/Comfort:  · Pain Rating 1: 6/10  · Location - Side 1: Right  · Location - Orientation 1: lower  · Location 1: leg  · Pain Addressed 1: Reposition,Distraction,Cessation of Activity  · Pain Rating Post-Intervention 1: 6/10    Objective:     Communicated with: nurse prior to session.  Patient found HOB elevated with bed alarm,PICC line upon OT entry to room.    General Precautions: Standard, fall,diabetic   Orthopedic Precautions:    Braces:    Respiratory Status: Room air     Occupational Performance:     Bed Mobility:    · Patient completed Rolling/Turning to Left with  modified independence  · Patient completed Rolling/Turning to Right with modified independence     AMPA 6 Click ADL: 17    Treatment & Education:  Pt/fam educated on Beazy Board use for tf, possibility of padding if needed, other tf techniques.   Pt declined OOB at this time 2/2 wound. Pt given red theraband and educated on and completed ex.  Placed Waffle mattress on bed; pt rolled L and R SBA.    Patient left HOB elevated with all lines intact, call button in reach, bed alarm on, nurse notified and family and charge nurse presentEducation:      GOALS:   Multidisciplinary Problems     Occupational Therapy Goals        Problem: Occupational Therapy Goal    Goal Priority Disciplines Outcome Interventions   Occupational Therapy Goal     OT, PT/OT Ongoing, Progressing    Description: Goals to be met by: 2/18     Patient will increase functional independence with ADLs by performing:    UE Dressing with Stand-by Assistance.  LE Dressing with Minimal Assistance.  Grooming while seated with Modified [place].  Toilet transfer to bedside commode with Moderate Assistance.  Upper extremity exercise program x10 reps per handout, with independence.                     Time Tracking:     OT Date of Treatment: 01/19/22  OT Start Time: 1449  OT Stop Time: 1555  OT Total Time (min): 66 min    Billable Minutes:Therapeutic Activity 25  Therapeutic Exercise 40               1/19/2022

## 2022-01-19 NOTE — HOSPITAL COURSE
Pt placed in observation for evaluation of wound infection.  Pt s/p R BKA with Dr. Rojas on 12/28/21.  Vanc and cefazolin initiated.  Wound culture presumptive pseudomonas species cefazolin discontinued and changed to cefepime.  UC candida and BC 1 of 2 bottles coag-neg staph suspected contaminant.  Gen Surg consulted and recommended santyl, xeroform, 4 x 4, kerlix and ace bandage daily.  Wound culture pseudomonas sensitive to cefepime, GNR with I&S pending.    PT/OT recommended rehab facility.  WC consulted and unstageable sacral decub.  Recommended waffle overlay and triad ointment to L buttocks BID and prn incontinent episode.

## 2022-01-19 NOTE — PLAN OF CARE
Pt is AAOx4. Pt received all medications per MAR. Pt blood glucose monitored. Pt wound care explained. Pt vitals are stable and safety maintained. Will continue to monitor.

## 2022-01-19 NOTE — SUBJECTIVE & OBJECTIVE
Past Medical History:   Diagnosis Date    Anxiety     Chronic pain syndrome     CKD (chronic kidney disease), stage III     Depression     Diabetes mellitus     type 2    Diabetes mellitus, type 2     GERD (gastroesophageal reflux disease)     Hyperemesis 3/23/2021    Hyperlipemia     Hypertension     Hypokalemia 3/23/2021    Myocardial infarction 2010    minor-caused by stress per pt.    Osteomyelitis     Osteomyelitis of left foot 4/30/2021    PVD (peripheral vascular disease)     Vaginal delivery     x1       Past Surgical History:   Procedure Laterality Date    ABOVE-KNEE AMPUTATION Left 5/18/2021    Procedure: AMPUTATION, ABOVE KNEE;  Surgeon: Teddy Huber MD;  Location: [facility] OR [address] [place] [address];  Service: Vascular;  Laterality: Left;    Angiogram - Right Extremity Right 7/9/15    angiogram-left leg  10/6/15    ANGIOGRAPHY OF LOWER EXTREMITY Left 4/29/2021    Procedure: ANGIOGRAM, LOWER EXTREMITY;  Surgeon: Teddy Huber MD;  Location: [facility] OR [address] [place] [address];  Service: Vascular;  Laterality: Left;    BELOW KNEE AMPUTATION OF LOWER EXTREMITY Right 12/28/2021    Procedure: AMPUTATION, BELOW KNEE;  Surgeon: Kaitlyn Rojas MD;  Location: [facility];  Service: General;  Laterality: Right;    CATHETERIZATION OF BOTH LEFT AND RIGHT HEART N/A 12/18/2019    Procedure: CATHETERIZATION, HEART, BOTH LEFT AND RIGHT;  Surgeon: Que Fernando III, MD;  Location: [facility] CATH LAB;  Service: Cardiology;  Laterality: N/A;    CORONARY ANGIOGRAPHY N/A 12/18/2019    Procedure: ANGIOGRAM, CORONARY ARTERY;  Surgeon: Que Fernando III, MD;  Location: [facility] CATH LAB;  Service: Cardiology;  Laterality: N/A;    CORONARY ANGIOGRAPHY INCLUDING BYPASS GRAFTS WITH CATHETERIZATION OF LEFT HEART N/A 7/28/2020    Procedure: ANGIOGRAM, CORONARY, INCLUDING BYPASS GRAFT, WITH LEFT HEART CATHETERIZATION, 9 am;  Surgeon: Rachel Easley MD;  Location: [facility] CATH LAB;  Service: Cardiology;  Laterality: N/A;    CORONARY  ARTERY BYPASS GRAFT (CABG) N/A 1/14/2020    Procedure: CORONARY ARTERY BYPASS GRAFT (CABG) x 1     Off Pump;  Surgeon: Huang Altamirano MD;  Location: [facility] OR [address] [place] [address];  Service: Cardiovascular;  Laterality: N/A;    CREATION OF FEMORAL-TIBIAL ARTERY BYPASS Left 4/29/2021    Procedure: CREATION, BYPASS, ARTERIAL, FEMORAL TO ANTERIOR TIBIAL;  Surgeon: Teddy Huber MD;  [facility];  Service: Vascular;  Laterality: Left;    CREATION OF FEMOROPOPLITEAL ARTERIAL BYPASS USING GRAFT Left 8/18/2020    Procedure: CREATION, BYPASS, ARTERIAL, FEMORAL TO POPLITEAL, USING GRAFT, LEFT LOWER EXTREMITY;  Surgeon: Teddy Huber MD;  Location: [facility] OR;  Service: Vascular;  Laterality: Left;  REQUEST 7:15 A.M. START----COVID NEGATIVE ON 8/17 1ST CASE STARTKENYA DUEÑAS ON 8/7/2020 @ 942AM-  RN PREOP 8/12/2020   T/S-----CLEARED BY CARDS-------PENDING INSURANCE    DEBRIDEMENT OF FOOT Left 9/8/2020    Procedure: DEBRIDEMENT, FOOT;  Surgeon: Rosio Mayes DPM;  Location: [facility] OR;  Service: Podiatry;  Laterality: Left;  request neoxx .   RN Pre Op 9-4-2020, Covid negative on 9/5/20. C A    DEBRIDEMENT OF FOOT  3/4/2021    Procedure: DEBRIDEMENT, FOOT;  Surgeon: Teddy Huber MD;  Location: [facility] OR;  Service: Vascular;;    DEBRIDEMENT OF FOOT Left 3/9/2021    Procedure: DEBRIDEMENT, FOOT, bone biopsy;  Surgeon: Rosio Mayes DPM;  Location: [facility] OR;  Service: Podiatry;  Laterality: Left;  Request neoxx---COVID IN AM  REQUESTING NOON START  RN Phone Pre op.On Blood thinners Plavix and Eliquis.  Covid am of surgery. C A    DEBRIDEMENT OF FOOT Left 5/4/2021    Procedure: DEBRIDEMENT, FOOT;  Surgeon: Farooq Morley DPM;  [facility];  Service: Podiatry;  Laterality: Left;    INSERTION OF TUNNELED CENTRAL VENOUS HEMODIALYSIS CATHETER N/A 1/27/2020    Procedure: Insertion, Catheter, Central Venous, Hemodialysis;  Surgeon: ESTEBAN Gomez III, MD;  Location: [facility] CATH LAB;   Service: Peripheral Vascular;  Laterality: N/A;    PERCUTANEOUS TRANSLUMINAL ANGIOPLASTY N/A 3/4/2021    Procedure: PTA (ANGIOPLASTY, PERCUTANEOUS, TRANSLUMINAL);  Surgeon: Teddy Huber MD;  Location: [facility] OR;  Service: Vascular;  Laterality: N/A;    REMOVAL OF ARTERIOVENOUS GRAFT Left 5/27/2021    Procedure: REMOVAL, GRAFT, LEFT LOWER EXTREMITY, WOUND EXPLORATION;  Surgeon: Teddy Huber MD;  Location: [facility] OR 2ND FLR;  Service: Vascular;  Laterality: Left;    REMOVAL OF NAIL OF DIGIT Left 3/9/2021    Procedure: REMOVAL, NAIL, DIGIT;  Surgeon: Rosio Mayes DPM;  Location: [facility] OR;  Service: Podiatry;  Laterality: Left;    THROMBECTOMY Left 3/4/2021    Procedure: THROMBECTOMY, LEFT LOWER EXTREMITY BYPASS GRAFT, ANGIOGRAM, POSSIBLE INTERVENTION, POSSIBLE LEFT LOWER EXTREMITY BYPASS;  Surgeon: Teddy Hubre MD;  Location: [facility] OR;  Service: Vascular;  Laterality: Left;    THROMBECTOMY Left 4/29/2021    Procedure: GRAFT THROMBECTOMY, LEFT LOWER EXTREMITY;  Surgeon: Teddy Huber MD;  Location: [facility] OR 2ND FLR;  Service: Vascular;  Laterality: Left;  14.5 min  1179.85 mGy  341.01 Gycm2  240 ml dye    THROMBECTOMY  10/22/2021    Procedure: THROMBECTOMY;  Surgeon: Saad Arenas MD;  Location: [facility] CATH LAB/EP;  Service: Cardiology;;       Review of patient's allergies indicates:   Allergen Reactions    Ciprofloxacin Itching    Contrast media      Kidney injury    Iodine      Kidney injury       Medications:  Medications Prior to Admission   Medication Sig    acetaminophen (TYLENOL) 500 MG tablet Take 2 tablets (1,000 mg total) by mouth every 8 (eight) hours as needed for Pain.    allopurinoL (ZYLOPRIM) 100 MG tablet Take 1 tablet (100 mg total) by mouth once daily.    amoxicillin-clavulanate 875-125mg (AUGMENTIN) 875-125 mg per tablet Take 1 tablet by mouth 2 (two) times daily.    apixaban (ELIQUIS) 5 mg Tab Take 1 tablet (5 mg total) by mouth 2 (two) times daily.     atorvastatin (LIPITOR) 80 MG tablet Take 1 tablet (80 mg total) by mouth every evening.    blood-glucose meter,continuous (DEXCOM G6 ) Misc Use to check blood sugars 4 times/day    blood-glucose sensor (DEXCOM G6 SENSOR) Radha Apply one sensor to skin every 10 days    blood-glucose transmitter (DEXCOM G6 TRANSMITTER) Radha Replace transmitter every 3 months to use with dexcom sensor    clopidogreL (PLAVIX) 75 mg tablet Take 1 tablet (75 mg total) by mouth once daily.    furosemide (LASIX) 40 MG tablet Take 1 tablet (40 mg total) by mouth once daily.    HYDROcodone-acetaminophen (NORCO) 5-325 mg per tablet Take 1 tablet by mouth every 4 (four) hours as needed for Pain.    insulin aspart U-100 (NOVOLOG FLEXPEN U-100 INSULIN) 100 unit/mL (3 mL) InPn pen Inject 5 Units into the skin 3 (three) times daily with meals. If not eating, ok to hold    insulin detemir U-100 (LEVEMIR FLEXTOUCH) 100 unit/mL (3 mL) SubQ InPn pen Inject 8 Units into the skin once daily. (Patient taking differently: Inject 8 Units into the skin every evening.)    magnesium oxide (MAG-OX) 400 mg (241.3 mg magnesium) tablet Take 1 tablet (400 mg total) by mouth 2 (two) times daily.    melatonin (MELATIN) 3 mg tablet Take 2 tablets (6 mg total) by mouth nightly as needed for Insomnia.    mirtazapine (REMERON) 7.5 MG Tab Take 2 tablets (15 mg total) by mouth nightly.    multivit/folic acid/vit K1 (ONE-A-DAY WOMEN'S 50 PLUS ORAL) Take 1 tablet by mouth Daily.    pregabalin (LYRICA) 50 MG capsule Take 3 capsules (150 mg total) by mouth 3 (three) times daily.    sertraline (ZOLOFT) 50 MG tablet Take 1 tablet (50 mg total) by mouth once daily.    sodium bicarbonate 650 MG tablet Take 1 tablet (650 mg total) by mouth 2 (two) times daily.     Antibiotics (From admission, onward)            Start     Stop Route Frequency Ordered    01/19/22 2100  cefepime in dextrose 5 % 1 gram/50 mL IVPB 1 g         -- IV Every 8 hours (non-standard  "times) 01/19/22 1322    01/18/22 2100  vancomycin 1.5 g in dextrose 5 % 250 mL IVPB (ready to mix)         -- IV Every 24 hours (non-standard times) 01/17/22 2020 01/17/22 2021  vancomycin - pharmacy to dose  (vancomycin IVPB)        "And" Linked Group Details    -- IV pharmacy to manage frequency 01/17/22 1922        Antifungals (From admission, onward)            None        Antivirals (From admission, onward)    None           Immunization History   Administered Date(s) Administered    COVID-19, MRNA, LN-S, PF (Pfizer) (Purple Cap) 03/09/2021, 03/30/2021       Family History     Problem Relation (Age of Onset)    Diabetes Mother, Father, Paternal Grandmother    Heart disease Maternal Grandmother    No Known Problems Maternal Grandfather, Paternal Grandfather        Social History     Socioeconomic History    Marital status:    Occupational History    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      Social Determinants of Health     Financial Resource Strain: High Risk    Difficulty of Paying Living Expenses: Hard   Food Insecurity: No Food Insecurity    Worried About Running Out of Food in the Last Year: Never true    Ran Out of Food in the Last Year: Never true   Transportation Needs: No Transportation Needs    Lack of Transportation (Medical): No    Lack of Transportation (Non-Medical): No   Stress: Stress Concern Present    Feeling of Stress : Rather much   Housing Stability: Low Risk     Unable to Pay for Housing in the Last Year: No    Number of Places Lived in the Last Year: 1    Unstable Housing in the Last Year: No     Review of Systems   Constitutional: Negative for chills, diaphoresis, fatigue and fever. "   HENT: Negative for hearing loss and sore throat.    Eyes: Negative for visual disturbance.   Respiratory: Negative for cough and shortness of breath.    Cardiovascular: Negative for chest pain and leg swelling.   Gastrointestinal: Negative for abdominal pain, diarrhea, nausea and vomiting.   Genitourinary: Negative for difficulty urinating and flank pain.   Musculoskeletal: Negative for back pain.   Skin: Positive for wound. Negative for rash.   Neurological: Negative for dizziness, weakness and headaches.   Psychiatric/Behavioral: Negative for agitation and confusion.     Objective:     Vital Signs (Most Recent):  Temp: 98.4 °F (36.9 °C) (01/19/22 1218)  Pulse: 88 (01/19/22 1218)  Resp: 18 (01/19/22 1218)  BP: (!) 172/82 (01/19/22 1218)  SpO2: 99 % (01/19/22 1218) Vital Signs (24h Range):  Temp:  [98 °F (36.7 °C)-98.5 °F (36.9 °C)] 98.4 °F (36.9 °C)  Pulse:  [88-99] 88  Resp:  [18] 18  SpO2:  [97 %-100 %] 99 %  BP: (133-191)/(63-96) 172/82     Weight: 76.2 kg (167 lb 15.9 oz)  Body mass index is 27.96 kg/m².    Estimated Creatinine Clearance: 59 mL/min (based on SCr of 1.1 mg/dL).    Physical Exam  Vitals and nursing note reviewed.   Constitutional:       General: She is not in acute distress.     Appearance: She is obese. She is not ill-appearing.   HENT:      Head: Normocephalic and atraumatic.      Mouth/Throat:      Mouth: Mucous membranes are moist.   Eyes:      Extraocular Movements: Extraocular movements intact.      Pupils: Pupils are equal, round, and reactive to light.   Cardiovascular:      Rate and Rhythm: Normal rate and regular rhythm.      Pulses: Normal pulses.      Heart sounds: Normal heart sounds.   Pulmonary:      Effort: Pulmonary effort is normal.   Abdominal:      General: Bowel sounds are normal.      Palpations: Abdomen is soft.   Musculoskeletal:         General: No swelling.      Cervical back: Normal range of motion and neck supple.      Right Lower Extremity: Right leg is amputated  below knee.      Left Lower Extremity: Left leg is amputated below knee.   Feet:      Right foot:      Skin integrity: Erythema and warmth present.      Comments: S/p right BKA 12/28.  Staples intact. Wound dehisced, primary to lateral surface.  Serous colored drainage noted throughout bandages with small amount of active drainage from wound.  Eschar noted primarily to lateral surface. Mild surrounding erythema.  Warmth to touch. See images below  Left BKA at baseline, uncomplicated.  No open wounds.  Skin:     General: Skin is warm and dry.      Capillary Refill: Capillary refill takes less than 2 seconds.      Comments: unstageable sacral decubitus   Neurological:      General: No focal deficit present.      Mental Status: She is alert and oriented to person, place, and time.   Psychiatric:         Mood and Affect: Mood normal.         Behavior: Behavior normal. Behavior is cooperative.         Significant Labs: All pertinent labs within the past 24 hours have been reviewed.    Significant Imaging: I have reviewed all pertinent imaging results/findings within the past 24 hours.

## 2022-01-19 NOTE — CONSULTS
[facility] Surg  Infectious Disease  Consult Note    Patient Name: Suyapa Connelly  MRN: [id number]  Admission Date: 1/17/2022  Hospital Length of Stay: 0 days  Attending Physician: Obinna Marr DO  Primary Care Provider: Magen Christensen MD     Isolation Status: Contact    Patient information was obtained from patient and past medical records.      Inpatient consult to Infectious Diseases  Consult performed by: Feliciano Espino MD  Consult ordered by: Shanice Duvall PA-C  Reason for consult: wound infection        Assessment/Plan:     * Infected wound  55 y/o female with PMHx CKD3, DMII, GERD, HLD, HTN, osteomyelitis, PVD, depression, and anxiety presents to ED with  with continue concern for wound infection.       -await final cxs  -continue vanco and cefepime          Thank you for your consult. I will follow-up with patient. Please contact us if you have any additional questions.    Feliciano Espino MD  Infectious Disease  [place] - Med Surg    Subjective:     Principal Problem: Infected wound    HPI: 55 y/o female with PMHx CKD3, DMII, GERD, HLD, HTN, osteomyelitis, PVD, depression, and anxiety presents to ED with  with continue concern for wound infection.  Patient underwent right BKA by Dr. Rojas on 12/28 then discharged to rehab facility on 1/10.  Rehab facility staff became concerned for possible wound infection and patient was sent back to ED on 1/11.  Lab work was obtained, and images of wound were reviewed with Dr. Rojas, who recommended continued outpatient management.  At discharge patient was prescribed Augmentin, which she has continued taking as instructed.   at bedside is concern for progressively worsening appearance of wound, along with continued drainage.  Patient reporting worsened pain today described as sharp, worse with touch, radiating towards right knee, severity 8/10.  No fevers, chills, nausea, vomiting, chest pain, cough, shortness of breath, abdominal pain,  urinary or bowel complications      ID is consulted for infection of BKA site with wound cxs growing presumptive pseudomonas        Past Medical History:   Diagnosis Date    Anxiety     Chronic pain syndrome     CKD (chronic kidney disease), stage III     Depression     Diabetes mellitus     type 2    Diabetes mellitus, type 2     GERD (gastroesophageal reflux disease)     Hyperemesis 3/23/2021    Hyperlipemia     Hypertension     Hypokalemia 3/23/2021    Myocardial infarction 2010    minor-caused by stress per pt.    Osteomyelitis     Osteomyelitis of left foot 4/30/2021    PVD (peripheral vascular disease)     Vaginal delivery     x1       Past Surgical History:   Procedure Laterality Date    ABOVE-KNEE AMPUTATION Left 5/18/2021    Procedure: AMPUTATION, ABOVE KNEE;  Surgeon: Teddy Huber MD;  Location: [facility] OR [address] [place] [address];  Service: Vascular;  Laterality: Left;    Angiogram - Right Extremity Right 7/9/15    angiogram-left leg  10/6/15    ANGIOGRAPHY OF LOWER EXTREMITY Left 4/29/2021    Procedure: ANGIOGRAM, LOWER EXTREMITY;  Surgeon: Teddy Huber MD;  Location: [facility] OR [address] [place] [address];  Service: Vascular;  Laterality: Left;    BELOW KNEE AMPUTATION OF LOWER EXTREMITY Right 12/28/2021    Procedure: AMPUTATION, BELOW KNEE;  Surgeon: Kaitlyn Rojas MD;  Location: [facility] OR;  Service: General;  Laterality: Right;    CATHETERIZATION OF BOTH LEFT AND RIGHT HEART N/A 12/18/2019    Procedure: CATHETERIZATION, HEART, BOTH LEFT AND RIGHT;  Surgeon: Que Fernando III, MD;  Location: [facility] CATH LAB;  Service: Cardiology;  Laterality: N/A;    CORONARY ANGIOGRAPHY N/A 12/18/2019    Procedure: ANGIOGRAM, CORONARY ARTERY;  Surgeon: Que Fernando III, MD;  Location: [facility] CATH LAB;  Service: Cardiology;  Laterality: N/A;    CORONARY ANGIOGRAPHY INCLUDING BYPASS GRAFTS WITH CATHETERIZATION OF LEFT HEART N/A 7/28/2020    Procedure: ANGIOGRAM, CORONARY, INCLUDING BYPASS GRAFT, WITH LEFT HEART  CATHETERIZATION, 9 am;  Surgeon: Rachel Easley MD;  Location: [facility] CATH LAB;  Service: Cardiology;  Laterality: N/A;    CORONARY ARTERY BYPASS GRAFT (CABG) N/A 1/14/2020    Procedure: CORONARY ARTERY BYPASS GRAFT (CABG) x 1     Off Pump;  Surgeon: Huang Altamirano MD;  Location: [facility] OR [address] [place] [address];  Service: Cardiovascular;  Laterality: N/A;    CREATION OF FEMORAL-TIBIAL ARTERY BYPASS Left 4/29/2021    Procedure: CREATION, BYPASS, ARTERIAL, FEMORAL TO ANTERIOR TIBIAL;  Surgeon: Teddy Huber MD;  Location: [facility] OR [address] [place] [address];  Service: Vascular;  Laterality: Left;    CREATION OF FEMOROPOPLITEAL ARTERIAL BYPASS USING GRAFT Left 8/18/2020    Procedure: CREATION, BYPASS, ARTERIAL, FEMORAL TO POPLITEAL, USING GRAFT, LEFT LOWER EXTREMITY;  Surgeon: Tdedy Huber MD;  Location: [facility];  Service: Vascular;  Laterality: Left;  REQUEST 7:15 A.M. START----COVID NEGATIVE ON 8/17  1ST CASE STARTKENYA PER LEANA ON 8/7/2020 @ 942AM-  RN PREOP 8/12/2020   T/S-----CLEARED BY CARDS-------PENDING INSURANCE    DEBRIDEMENT OF FOOT Left 9/8/2020    Procedure: DEBRIDEMENT, FOOT;  Surgeon: Rosio Mayes DPM;  Location: [facility];  Service: Podiatry;  Laterality: Left;  request neoxx .   RN Pre Op 9-4-2020, Covid negative on 9/5/20. C A    DEBRIDEMENT OF FOOT  3/4/2021    Procedure: DEBRIDEMENT, FOOT;  Surgeon: Teddy Huber MD;  Location: [facility] OR;  Service: Vascular;;    DEBRIDEMENT OF FOOT Left 3/9/2021    Procedure: DEBRIDEMENT, FOOT, bone biopsy;  Surgeon: Rosio Mayes DPM;  Location: [facility] OR;  Service: Podiatry;  Laterality: Left;  Request neoxx---COVID IN AM  REQUESTING NOON START  RN Phone Pre op.On Blood thinners Plavix and Eliquis.  Covid am of surgery. C A    DEBRIDEMENT OF FOOT Left 5/4/2021    Procedure: DEBRIDEMENT, FOOT;  Surgeon: Farooq Morley DPM;  Location: [facility] OR 2ND FLR;  Service: Podiatry;  Laterality: Left;    INSERTION OF TUNNELED CENTRAL VENOUS HEMODIALYSIS CATHETER N/A  1/27/2020    Procedure: Insertion, Catheter, Central Venous, Hemodialysis;  Surgeon: ESTEBAN Gomez III, MD;  Location: [facility] CATH LAB;  Service: Peripheral Vascular;  Laterality: N/A;    PERCUTANEOUS TRANSLUMINAL ANGIOPLASTY N/A 3/4/2021    Procedure: PTA (ANGIOPLASTY, PERCUTANEOUS, TRANSLUMINAL);  Surgeon: Teddy Huber MD;  Location: [facility] OR;  Service: Vascular;  Laterality: N/A;    REMOVAL OF ARTERIOVENOUS GRAFT Left 5/27/2021    Procedure: REMOVAL, GRAFT, LEFT LOWER EXTREMITY, WOUND EXPLORATION;  Surgeon: Teddy Huber MD;  Location: [facility] OR 2ND FLR;  Service: Vascular;  Laterality: Left;    REMOVAL OF NAIL OF DIGIT Left 3/9/2021    Procedure: REMOVAL, NAIL, DIGIT;  Surgeon: Rosio Mayes DPM;  Location: [facility] OR;  Service: Podiatry;  Laterality: Left;    THROMBECTOMY Left 3/4/2021    Procedure: THROMBECTOMY, LEFT LOWER EXTREMITY BYPASS GRAFT, ANGIOGRAM, POSSIBLE INTERVENTION, POSSIBLE LEFT LOWER EXTREMITY BYPASS;  Surgeon: Teddy Huber MD;  Location: [facility] OR;  Service: Vascular;  Laterality: Left;    THROMBECTOMY Left 4/29/2021    Procedure: GRAFT THROMBECTOMY, LEFT LOWER EXTREMITY;  Surgeon: Teddy Huber MD;  Location: [facility] OR 2ND FLR;  Service: Vascular;  Laterality: Left;  14.5 min  1179.85 mGy  341.01 Gycm2  240 ml dye    THROMBECTOMY  10/22/2021    Procedure: THROMBECTOMY;  Surgeon: Saad Arenas MD;  Location: [facility] CATH LAB/EP;  Service: Cardiology;;       Review of patient's allergies indicates:   Allergen Reactions    Ciprofloxacin Itching    Contrast media      Kidney injury    Iodine      Kidney injury       Medications:  Medications Prior to Admission   Medication Sig    acetaminophen (TYLENOL) 500 MG tablet Take 2 tablets (1,000 mg total) by mouth every 8 (eight) hours as needed for Pain.    allopurinoL (ZYLOPRIM) 100 MG tablet Take 1 tablet (100 mg total) by mouth once daily.    amoxicillin-clavulanate 875-125mg (AUGMENTIN) 875-125 mg per tablet Take 1  tablet by mouth 2 (two) times daily.    apixaban (ELIQUIS) 5 mg Tab Take 1 tablet (5 mg total) by mouth 2 (two) times daily.    atorvastatin (LIPITOR) 80 MG tablet Take 1 tablet (80 mg total) by mouth every evening.    blood-glucose meter,continuous (DEXCOM G6 ) Misc Use to check blood sugars 4 times/day    blood-glucose sensor (DEXCOM G6 SENSOR) Radha Apply one sensor to skin every 10 days    blood-glucose transmitter (DEXCOM G6 TRANSMITTER) Radha Replace transmitter every 3 months to use with dexcom sensor    clopidogreL (PLAVIX) 75 mg tablet Take 1 tablet (75 mg total) by mouth once daily.    furosemide (LASIX) 40 MG tablet Take 1 tablet (40 mg total) by mouth once daily.    HYDROcodone-acetaminophen (NORCO) 5-325 mg per tablet Take 1 tablet by mouth every 4 (four) hours as needed for Pain.    insulin aspart U-100 (NOVOLOG FLEXPEN U-100 INSULIN) 100 unit/mL (3 mL) InPn pen Inject 5 Units into the skin 3 (three) times daily with meals. If not eating, ok to hold    insulin detemir U-100 (LEVEMIR FLEXTOUCH) 100 unit/mL (3 mL) SubQ InPn pen Inject 8 Units into the skin once daily. (Patient taking differently: Inject 8 Units into the skin every evening.)    magnesium oxide (MAG-OX) 400 mg (241.3 mg magnesium) tablet Take 1 tablet (400 mg total) by mouth 2 (two) times daily.    melatonin (MELATIN) 3 mg tablet Take 2 tablets (6 mg total) by mouth nightly as needed for Insomnia.    mirtazapine (REMERON) 7.5 MG Tab Take 2 tablets (15 mg total) by mouth nightly.    multivit/folic acid/vit K1 (ONE-A-DAY WOMEN'S 50 PLUS ORAL) Take 1 tablet by mouth Daily.    pregabalin (LYRICA) 50 MG capsule Take 3 capsules (150 mg total) by mouth 3 (three) times daily.    sertraline (ZOLOFT) 50 MG tablet Take 1 tablet (50 mg total) by mouth once daily.    sodium bicarbonate 650 MG tablet Take 1 tablet (650 mg total) by mouth 2 (two) times daily.     Antibiotics (From admission, onward)            Start     Stop Route  "Frequency Ordered    01/19/22 2100  cefepime in dextrose 5 % 1 gram/50 mL IVPB 1 g         -- IV Every 8 hours (non-standard times) 01/19/22 1322    01/18/22 2100  vancomycin 1.5 g in dextrose 5 % 250 mL IVPB (ready to mix)         -- IV Every 24 hours (non-standard times) 01/17/22 2020 01/17/22 2021  vancomycin - pharmacy to dose  (vancomycin IVPB)        "And" Linked Group Details    -- IV pharmacy to manage frequency 01/17/22 1922        Antifungals (From admission, onward)            None        Antivirals (From admission, onward)    None           Immunization History   Administered Date(s) Administered    COVID-19, MRNA, LN-S, PF (Pfizer) (Purple Cap) 03/09/2021, 03/30/2021       Family History     Problem Relation (Age of Onset)    Diabetes Mother, Father, Paternal Grandmother    Heart disease Maternal Grandmother    No Known Problems Maternal Grandfather, Paternal Grandfather        Social History     Socioeconomic History    Marital status:    Occupational History    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      Social Determinants of Health     Financial Resource Strain: High Risk    Difficulty of Paying Living Expenses: Hard   Food Insecurity: No Food Insecurity    Worried About Running Out of Food in the Last Year: Never true    Ran Out of Food in the Last Year: Never true   Transportation Needs: No Transportation Needs    Lack of Transportation (Medical): No    Lack of Transportation (Non-Medical): No   Stress: Stress Concern Present    Feeling of Stress : Rather much   Housing Stability: Low Risk     Unable to Pay for Housing in the Last Year: No    Number of Places Lived in the Last Year: 1    Unstable " Housing in the Last Year: No     Review of Systems   Constitutional: Negative for chills, diaphoresis, fatigue and fever.   HENT: Negative for hearing loss and sore throat.    Eyes: Negative for visual disturbance.   Respiratory: Negative for cough and shortness of breath.    Cardiovascular: Negative for chest pain and leg swelling.   Gastrointestinal: Negative for abdominal pain, diarrhea, nausea and vomiting.   Genitourinary: Negative for difficulty urinating and flank pain.   Musculoskeletal: Negative for back pain.   Skin: Positive for wound. Negative for rash.   Neurological: Negative for dizziness, weakness and headaches.   Psychiatric/Behavioral: Negative for agitation and confusion.     Objective:     Vital Signs (Most Recent):  Temp: 98.4 °F (36.9 °C) (01/19/22 1218)  Pulse: 88 (01/19/22 1218)  Resp: 18 (01/19/22 1218)  BP: (!) 172/82 (01/19/22 1218)  SpO2: 99 % (01/19/22 1218) Vital Signs (24h Range):  Temp:  [98 °F (36.7 °C)-98.5 °F (36.9 °C)] 98.4 °F (36.9 °C)  Pulse:  [88-99] 88  Resp:  [18] 18  SpO2:  [97 %-100 %] 99 %  BP: (133-191)/(63-96) 172/82     Weight: 76.2 kg (167 lb 15.9 oz)  Body mass index is 27.96 kg/m².    Estimated Creatinine Clearance: 59 mL/min (based on SCr of 1.1 mg/dL).    Physical Exam  Vitals and nursing note reviewed.   Constitutional:       General: She is not in acute distress.     Appearance: She is obese. She is not ill-appearing.   HENT:      Head: Normocephalic and atraumatic.      Mouth/Throat:      Mouth: Mucous membranes are moist.   Eyes:      Extraocular Movements: Extraocular movements intact.      Pupils: Pupils are equal, round, and reactive to light.   Cardiovascular:      Rate and Rhythm: Normal rate and regular rhythm.      Pulses: Normal pulses.      Heart sounds: Normal heart sounds.   Pulmonary:      Effort: Pulmonary effort is normal.   Abdominal:      General: Bowel sounds are normal.      Palpations: Abdomen is soft.   Musculoskeletal:         General: No  swelling.      Cervical back: Normal range of motion and neck supple.      Right Lower Extremity: Right leg is amputated below knee.      Left Lower Extremity: Left leg is amputated below knee.   Feet:      Right foot:      Skin integrity: Erythema and warmth present.      Comments: S/p right BKA 12/28.  Staples intact. Wound dehisced, primary to lateral surface.  Serous colored drainage noted throughout bandages with small amount of active drainage from wound.  Eschar noted primarily to lateral surface. Mild surrounding erythema.  Warmth to touch. See images below  Left BKA at baseline, uncomplicated.  No open wounds.  Skin:     General: Skin is warm and dry.      Capillary Refill: Capillary refill takes less than 2 seconds.      Comments: unstageable sacral decubitus   Neurological:      General: No focal deficit present.      Mental Status: She is alert and oriented to person, place, and time.   Psychiatric:         Mood and Affect: Mood normal.         Behavior: Behavior normal. Behavior is cooperative.         Significant Labs: All pertinent labs within the past 24 hours have been reviewed.    Significant Imaging: I have reviewed all pertinent imaging results/findings within the past 24 hours.

## 2022-01-19 NOTE — ASSESSMENT & PLAN NOTE
Wound Care assistance appreciated  Recommended waffle overlay and triad ointment to L buttocks BID and prn incontinent episode.

## 2022-01-19 NOTE — PROGRESS NOTES
"Surgery follow up  BP (!) 172/82 (Patient Position: Lying)   Pulse 88   Temp 98.4 °F (36.9 °C) (Oral)   Resp 18   Ht 5' 5" (1.651 m)   Wt 76.2 kg (167 lb 15.9 oz)   LMP 09/30/2015 (Exact Date)   SpO2 99%   BMI 27.96 kg/m²   I/O last 3 completed shifts:  In: 360 [P.O.:360]  Out: 0   No intake/output data recorded.      Recent Results (from the past 336 hour(s))   CBC Auto Differential    Collection Time: 01/19/22  4:45 AM   Result Value Ref Range    WBC 7.83 3.90 - 12.70 K/uL    Hemoglobin 8.2 (L) 12.0 - 16.0 g/dL    Hematocrit 26.5 (L) 37.0 - 48.5 %    Platelets 353 150 - 450 K/uL   CBC with Automated Differential    Collection Time: 01/18/22  5:19 AM   Result Value Ref Range    WBC 8.68 3.90 - 12.70 K/uL    Hemoglobin 8.2 (L) 12.0 - 16.0 g/dL    Hematocrit 26.1 (L) 37.0 - 48.5 %    Platelets 357 150 - 450 K/uL   CBC auto differential    Collection Time: 01/17/22  3:47 PM   Result Value Ref Range    WBC 10.66 3.90 - 12.70 K/uL    Hemoglobin 8.7 (L) 12.0 - 16.0 g/dL    Hematocrit 27.4 (L) 37.0 - 48.5 %    Platelets 404 150 - 450 K/uL     Recent Results (from the past 336 hour(s))   Basic Metabolic Panel    Collection Time: 01/19/22  4:45 AM   Result Value Ref Range    Sodium 137 136 - 145 mmol/L    Potassium 3.5 3.5 - 5.1 mmol/L    Chloride 105 95 - 110 mmol/L    CO2 20 (L) 23 - 29 mmol/L    BUN 24 (H) 6 - 20 mg/dL    Creatinine 1.1 0.5 - 1.4 mg/dL    Calcium 7.9 (L) 8.7 - 10.5 mg/dL    Anion Gap 12 8 - 16 mmol/L   Basic metabolic panel    Collection Time: 01/10/22  8:17 AM   Result Value Ref Range    Sodium 138 136 - 145 mmol/L    Potassium 4.3 3.5 - 5.1 mmol/L    Chloride 107 95 - 110 mmol/L    CO2 24 23 - 29 mmol/L    BUN 33 (H) 6 - 20 mg/dL    Creatinine 0.9 0.5 - 1.4 mg/dL    Calcium 8.1 (L) 8.7 - 10.5 mg/dL    Anion Gap 7 (L) 8 - 16 mmol/L   Basic metabolic panel    Collection Time: 01/07/22 12:51 PM   Result Value Ref Range    Sodium 139 136 - 145 mmol/L    Potassium 3.9 3.5 - 5.1 mmol/L    Chloride " 108 95 - 110 mmol/L    CO2 25 23 - 29 mmol/L    BUN 25 (H) 6 - 20 mg/dL    Creatinine 0.9 0.5 - 1.4 mg/dL    Calcium 7.9 (L) 8.7 - 10.5 mg/dL    Anion Gap 6 (L) 8 - 16 mmol/L   sleeping comfortably, dressing dry and intact , on PT/OT

## 2022-01-20 ENCOUNTER — PATIENT OUTREACH (OUTPATIENT)
Dept: ADMINISTRATIVE | Facility: OTHER | Age: 57
End: 2022-01-20
Payer: MEDICARE

## 2022-01-20 LAB
ANION GAP SERPL CALC-SCNC: 9 MMOL/L (ref 8–16)
BACTERIA BLD CULT: ABNORMAL
BASOPHILS # BLD AUTO: 0.05 K/UL (ref 0–0.2)
BASOPHILS NFR BLD: 0.7 % (ref 0–1.9)
BUN SERPL-MCNC: 30 MG/DL (ref 6–20)
CALCIUM SERPL-MCNC: 8.4 MG/DL (ref 8.7–10.5)
CHLORIDE SERPL-SCNC: 106 MMOL/L (ref 95–110)
CO2 SERPL-SCNC: 23 MMOL/L (ref 23–29)
CREAT SERPL-MCNC: 1 MG/DL (ref 0.5–1.4)
DIFFERENTIAL METHOD: ABNORMAL
EOSINOPHIL # BLD AUTO: 0.3 K/UL (ref 0–0.5)
EOSINOPHIL NFR BLD: 3.6 % (ref 0–8)
ERYTHROCYTE [DISTWIDTH] IN BLOOD BY AUTOMATED COUNT: 14.6 % (ref 11.5–14.5)
EST. GFR  (AFRICAN AMERICAN): >60 ML/MIN/1.73 M^2
EST. GFR  (NON AFRICAN AMERICAN): >60 ML/MIN/1.73 M^2
GLUCOSE SERPL-MCNC: 185 MG/DL (ref 70–110)
HCT VFR BLD AUTO: 27.3 % (ref 37–48.5)
HGB BLD-MCNC: 8.5 G/DL (ref 12–16)
IMM GRANULOCYTES # BLD AUTO: 0.02 K/UL (ref 0–0.04)
IMM GRANULOCYTES NFR BLD AUTO: 0.3 % (ref 0–0.5)
LYMPHOCYTES # BLD AUTO: 1.6 K/UL (ref 1–4.8)
LYMPHOCYTES NFR BLD: 21.1 % (ref 18–48)
MCH RBC QN AUTO: 29.8 PG (ref 27–31)
MCHC RBC AUTO-ENTMCNC: 31.1 G/DL (ref 32–36)
MCV RBC AUTO: 96 FL (ref 82–98)
MONOCYTES # BLD AUTO: 0.8 K/UL (ref 0.3–1)
MONOCYTES NFR BLD: 10.1 % (ref 4–15)
NEUTROPHILS # BLD AUTO: 4.8 K/UL (ref 1.8–7.7)
NEUTROPHILS NFR BLD: 64.2 % (ref 38–73)
NRBC BLD-RTO: 0 /100 WBC
PLATELET # BLD AUTO: 333 K/UL (ref 150–450)
PMV BLD AUTO: 11 FL (ref 9.2–12.9)
POCT GLUCOSE: 175 MG/DL (ref 70–110)
POCT GLUCOSE: 198 MG/DL (ref 70–110)
POCT GLUCOSE: 299 MG/DL (ref 70–110)
POTASSIUM SERPL-SCNC: 3.8 MMOL/L (ref 3.5–5.1)
RBC # BLD AUTO: 2.85 M/UL (ref 4–5.4)
SODIUM SERPL-SCNC: 138 MMOL/L (ref 136–145)
VANCOMYCIN SERPL-MCNC: 23.5 UG/ML
WBC # BLD AUTO: 7.52 K/UL (ref 3.9–12.7)

## 2022-01-20 PROCEDURE — 36415 COLL VENOUS BLD VENIPUNCTURE: CPT | Mod: HCNC | Performed by: INTERNAL MEDICINE

## 2022-01-20 PROCEDURE — 27000207 HC ISOLATION: Mod: HCNC

## 2022-01-20 PROCEDURE — 63600175 PHARM REV CODE 636 W HCPCS: Mod: HCNC | Performed by: PHYSICIAN ASSISTANT

## 2022-01-20 PROCEDURE — 97530 THERAPEUTIC ACTIVITIES: CPT | Mod: HCNC

## 2022-01-20 PROCEDURE — 11000001 HC ACUTE MED/SURG PRIVATE ROOM: Mod: HCNC

## 2022-01-20 PROCEDURE — 97542 WHEELCHAIR MNGMENT TRAINING: CPT | Mod: HCNC

## 2022-01-20 PROCEDURE — 87186 SC STD MICRODIL/AGAR DIL: CPT | Mod: 59,HCNC | Performed by: SURGERY

## 2022-01-20 PROCEDURE — 94760 N-INVAS EAR/PLS OXIMETRY 1: CPT | Mod: HCNC

## 2022-01-20 PROCEDURE — 87075 CULTR BACTERIA EXCEPT BLOOD: CPT | Mod: HCNC | Performed by: SURGERY

## 2022-01-20 PROCEDURE — 80202 ASSAY OF VANCOMYCIN: CPT | Mod: HCNC | Performed by: INTERNAL MEDICINE

## 2022-01-20 PROCEDURE — 36415 COLL VENOUS BLD VENIPUNCTURE: CPT | Mod: HCNC | Performed by: PHYSICIAN ASSISTANT

## 2022-01-20 PROCEDURE — 99232 SBSQ HOSP IP/OBS MODERATE 35: CPT | Mod: HCNC,,, | Performed by: INTERNAL MEDICINE

## 2022-01-20 PROCEDURE — 25000003 PHARM REV CODE 250: Mod: HCNC

## 2022-01-20 PROCEDURE — 85025 COMPLETE CBC W/AUTO DIFF WBC: CPT | Mod: HCNC | Performed by: PHYSICIAN ASSISTANT

## 2022-01-20 PROCEDURE — 94761 N-INVAS EAR/PLS OXIMETRY MLT: CPT | Mod: HCNC

## 2022-01-20 PROCEDURE — 80048 BASIC METABOLIC PNL TOTAL CA: CPT | Mod: HCNC | Performed by: PHYSICIAN ASSISTANT

## 2022-01-20 PROCEDURE — 87077 CULTURE AEROBIC IDENTIFY: CPT | Mod: 59,HCNC | Performed by: SURGERY

## 2022-01-20 PROCEDURE — 99232 PR SUBSEQUENT HOSPITAL CARE,LEVL II: ICD-10-PCS | Mod: HCNC,,, | Performed by: INTERNAL MEDICINE

## 2022-01-20 PROCEDURE — 87070 CULTURE OTHR SPECIMN AEROBIC: CPT | Mod: HCNC | Performed by: SURGERY

## 2022-01-20 RX ORDER — INSULIN ASPART 100 [IU]/ML
3 INJECTION, SOLUTION INTRAVENOUS; SUBCUTANEOUS
Status: DISCONTINUED | OUTPATIENT
Start: 2022-01-20 | End: 2022-01-21

## 2022-01-20 RX ADMIN — PREGABALIN 150 MG: 75 CAPSULE ORAL at 08:01

## 2022-01-20 RX ADMIN — HYDROCODONE BITARTRATE AND ACETAMINOPHEN 1 TABLET: 5; 325 TABLET ORAL at 10:01

## 2022-01-20 RX ADMIN — MIRTAZAPINE 15 MG: 7.5 TABLET ORAL at 09:01

## 2022-01-20 RX ADMIN — APIXABAN 5 MG: 5 TABLET, FILM COATED ORAL at 09:01

## 2022-01-20 RX ADMIN — HYDROCODONE BITARTRATE AND ACETAMINOPHEN 1 TABLET: 5; 325 TABLET ORAL at 02:01

## 2022-01-20 RX ADMIN — CEFEPIME HYDROCHLORIDE 1 G: 1 INJECTION, SOLUTION INTRAVENOUS at 12:01

## 2022-01-20 RX ADMIN — INSULIN ASPART 3 UNITS: 100 INJECTION, SOLUTION INTRAVENOUS; SUBCUTANEOUS at 04:01

## 2022-01-20 RX ADMIN — SODIUM BICARBONATE 650 MG: 650 TABLET ORAL at 08:01

## 2022-01-20 RX ADMIN — SODIUM BICARBONATE 650 MG: 650 TABLET ORAL at 09:01

## 2022-01-20 RX ADMIN — FUROSEMIDE 40 MG: 40 TABLET ORAL at 08:01

## 2022-01-20 RX ADMIN — CEFEPIME HYDROCHLORIDE 1 G: 1 INJECTION, SOLUTION INTRAVENOUS at 05:01

## 2022-01-20 RX ADMIN — ALLOPURINOL 100 MG: 100 TABLET ORAL at 08:01

## 2022-01-20 RX ADMIN — COLLAGENASE SANTYL: 250 OINTMENT TOPICAL at 02:01

## 2022-01-20 RX ADMIN — CLOPIDOGREL 75 MG: 75 TABLET, FILM COATED ORAL at 08:01

## 2022-01-20 RX ADMIN — INSULIN ASPART 3 UNITS: 100 INJECTION, SOLUTION INTRAVENOUS; SUBCUTANEOUS at 10:01

## 2022-01-20 RX ADMIN — INSULIN ASPART 1 UNITS: 100 INJECTION, SOLUTION INTRAVENOUS; SUBCUTANEOUS at 09:01

## 2022-01-20 RX ADMIN — INSULIN ASPART 3 UNITS: 100 INJECTION, SOLUTION INTRAVENOUS; SUBCUTANEOUS at 12:01

## 2022-01-20 RX ADMIN — ATORVASTATIN CALCIUM 80 MG: 40 TABLET, FILM COATED ORAL at 09:01

## 2022-01-20 RX ADMIN — CEFEPIME HYDROCHLORIDE 1 G: 1 INJECTION, SOLUTION INTRAVENOUS at 09:01

## 2022-01-20 RX ADMIN — HYDROCODONE BITARTRATE AND ACETAMINOPHEN 1 TABLET: 5; 325 TABLET ORAL at 06:01

## 2022-01-20 RX ADMIN — MELATONIN TAB 3 MG 6 MG: 3 TAB at 09:01

## 2022-01-20 RX ADMIN — SERTRALINE HYDROCHLORIDE 50 MG: 50 TABLET ORAL at 08:01

## 2022-01-20 RX ADMIN — PREGABALIN 150 MG: 75 CAPSULE ORAL at 09:01

## 2022-01-20 RX ADMIN — PREGABALIN 150 MG: 75 CAPSULE ORAL at 02:01

## 2022-01-20 RX ADMIN — APIXABAN 5 MG: 5 TABLET, FILM COATED ORAL at 08:01

## 2022-01-20 NOTE — PROGRESS NOTES
[place] [facility] Surg  Infectious Disease  Progress Note    Patient Name: Suyapa Connelly  MRN: [id number]  Admission Date: 1/17/2022  Length of Stay: 0 days  Attending Physician: Obinna Marr DO  Primary Care Provider: Magen Christensen MD    Isolation Status: Contact  Assessment/Plan:      * Infected wound  57 y/o female with PMHx CKD3, DMII, GERD, HLD, HTN, osteomyelitis, PVD, depression, and anxiety presents to ED with  with continue concern for wound infection.       -await final cxs - new GNR on culture plus pseudomonas  -continue vanco and cefepime  -may be able to treat with po cipro if all gnrs sensitive              Thank you for your consult. I will follow-up with patient. Please contact us if you have any additional questions.    Feliciano Espino MD  Infectious Disease  [place] - Med Surg    Subjective:     Principal Problem:Infected wound    HPI: 57 y/o female with PMHx CKD3, DMII, GERD, HLD, HTN, osteomyelitis, PVD, depression, and anxiety presents to ED with  with continue concern for wound infection.  Patient underwent right BKA by Dr. Rojas on 12/28 then discharged to rehab facility on 1/10.  Rehab facility staff became concerned for possible wound infection and patient was sent back to ED on 1/11.  Lab work was obtained, and images of wound were reviewed with Dr. Rojas, who recommended continued outpatient management.  At discharge patient was prescribed Augmentin, which she has continued taking as instructed.   at bedside is concern for progressively worsening appearance of wound, along with continued drainage.  Patient reporting worsened pain today described as sharp, worse with touch, radiating towards right knee, severity 8/10.  No fevers, chills, nausea, vomiting, chest pain, cough, shortness of breath, abdominal pain, urinary or bowel complications      ID is consulted for infection of BKA site with wound cxs growing presumptive pseudomonas      Interval History: No acute  events    Review of Systems   Constitutional: Negative for chills, diaphoresis, fatigue and fever.   HENT: Negative for hearing loss and sore throat.    Eyes: Negative for visual disturbance.   Respiratory: Negative for cough and shortness of breath.    Cardiovascular: Negative for chest pain and leg swelling.   Gastrointestinal: Negative for abdominal pain, diarrhea, nausea and vomiting.   Genitourinary: Negative for difficulty urinating and flank pain.   Musculoskeletal: Negative for back pain.   Skin: Positive for wound. Negative for rash.   Neurological: Negative for dizziness, weakness and headaches.   Psychiatric/Behavioral: Negative for agitation and confusion.     Objective:     Vital Signs (Most Recent):  Temp: 97.8 °F (36.6 °C) (01/20/22 1200)  Pulse: 85 (01/20/22 1200)  Resp: 18 (01/20/22 1200)  BP: (!) 147/88 (01/20/22 1200)  SpO2: 100 % (01/20/22 1200) Vital Signs (24h Range):  Temp:  [97.2 °F (36.2 °C)-98.6 °F (37 °C)] 97.8 °F (36.6 °C)  Pulse:  [85-98] 85  Resp:  [16-18] 18  SpO2:  [97 %-100 %] 100 %  BP: (147-189)/(67-88) 147/88     Weight: 76.2 kg (167 lb 15.9 oz)  Body mass index is 27.96 kg/m².    Estimated Creatinine Clearance: 64.9 mL/min (based on SCr of 1 mg/dL).    Physical Exam  Vitals and nursing note reviewed.   Constitutional:       General: She is not in acute distress.     Appearance: She is obese. She is not ill-appearing.   HENT:      Head: Normocephalic and atraumatic.      Mouth/Throat:      Mouth: Mucous membranes are moist.   Eyes:      Extraocular Movements: Extraocular movements intact.      Pupils: Pupils are equal, round, and reactive to light.   Cardiovascular:      Rate and Rhythm: Normal rate and regular rhythm.      Pulses: Normal pulses.      Heart sounds: Normal heart sounds.   Pulmonary:      Effort: Pulmonary effort is normal.   Abdominal:      General: Bowel sounds are normal.      Palpations: Abdomen is soft.   Musculoskeletal:         General: No swelling.       Cervical back: Normal range of motion and neck supple.      Right Lower Extremity: Right leg is amputated below knee.      Left Lower Extremity: Left leg is amputated below knee.   Feet:      Right foot:      Skin integrity: Erythema and warmth present.      Comments: S/p right BKA 12/28.  Staples intact. Wound dehisced, primary to lateral surface.  Serous colored drainage noted throughout bandages with small amount of active drainage from wound.  Eschar noted primarily to lateral surface. Mild surrounding erythema.  Warmth to touch. See images below  Left BKA at baseline, uncomplicated.  No open wounds.  Skin:     General: Skin is warm and dry.      Capillary Refill: Capillary refill takes less than 2 seconds.      Comments: unstageable sacral decubitus   Neurological:      General: No focal deficit present.      Mental Status: She is alert and oriented to person, place, and time.   Psychiatric:         Mood and Affect: Mood normal.         Behavior: Behavior normal. Behavior is cooperative.         Significant Labs: All pertinent labs within the past 24 hours have been reviewed.    Significant Imaging: I have reviewed all pertinent imaging results/findings within the past 24 hours.

## 2022-01-20 NOTE — PLAN OF CARE
PT A/OX4. Plan of care reviewed with patient. Pt verbalized understanding. Pt medicated per MAR, Wound care and dressing change completed per orders. Pt on waffle mattress. Safety precautions maintained. Instructed to call for assistance. Call light in reach. Bed alarm set.    Pt once again asked to quite down. Manic behavior continues. Pt pacing back and forth. Will continue to monitor.

## 2022-01-20 NOTE — PROGRESS NOTES
VANCOMYCIN DOSING BY PHARMACY PROGRESS NOTE    Suyapa Connelly is a 56 y.o. female who has been consulted for vancomycin dosing.    Indication: SSTI  Goal trough: 10-15 mcg/mL    Labs and vitals:  Weight: 76.2 kg  SCr: 1.1 (1/19)  CrCl: 59 mL/min (1/19)  Vancomycin level (s): 29.6 mcg/mL on 1/19@1928    Assessment:  Renal function remains stable. 22.5 hours between last dose and level.       Plan:  Will HOLD further doses until random level is less than 20 mcg/mL. Will obtain a random level on 1/20 @0800 to help guide further dosing.      Pharmacy will continue to follow and monitor vancomycin. Thank you for allowing us to participate in this patient's care.     LETICIA Le, PharmD, [facility]  [phone]

## 2022-01-20 NOTE — ASSESSMENT & PLAN NOTE
55 y/o female with PMHx CKD3, DMII, GERD, HLD, HTN, osteomyelitis, PVD, depression, and anxiety presents to ED with  with continue concern for wound infection.       -await final cxs - new GNR on culture plus pseudomonas  -continue vanco and cefepime  -may be able to treat with po cipro if all gnrs sensitive

## 2022-01-20 NOTE — PROGRESS NOTES
"Surgery follow up  BP (!) 147/88 (Patient Position: Lying)   Pulse 85   Temp 97.8 °F (36.6 °C) (Oral)   Resp 18   Ht 5' 5" (1.651 m)   Wt 76.2 kg (167 lb 15.9 oz)   LMP 09/30/2015 (Exact Date)   SpO2 100%   BMI 27.96 kg/m²   I/O last 3 completed shifts:  In: 1343.5 [P.O.:755; IV Piggyback:588.5]  Out: 825 [Urine:825]  No intake/output data recorded.      Recent Results (from the past 336 hour(s))   CBC Auto Differential    Collection Time: 01/20/22  5:08 AM   Result Value Ref Range    WBC 7.52 3.90 - 12.70 K/uL    Hemoglobin 8.5 (L) 12.0 - 16.0 g/dL    Hematocrit 27.3 (L) 37.0 - 48.5 %    Platelets 333 150 - 450 K/uL   CBC Auto Differential    Collection Time: 01/19/22  4:45 AM   Result Value Ref Range    WBC 7.83 3.90 - 12.70 K/uL    Hemoglobin 8.2 (L) 12.0 - 16.0 g/dL    Hematocrit 26.5 (L) 37.0 - 48.5 %    Platelets 353 150 - 450 K/uL   CBC with Automated Differential    Collection Time: 01/18/22  5:19 AM   Result Value Ref Range    WBC 8.68 3.90 - 12.70 K/uL    Hemoglobin 8.2 (L) 12.0 - 16.0 g/dL    Hematocrit 26.1 (L) 37.0 - 48.5 %    Platelets 357 150 - 450 K/uL   wound examined , continue present treatment  Will re check cultures , may need debridement at a later date.    "

## 2022-01-20 NOTE — PROGRESS NOTES
Pharmacokinetic Assessment Follow Up: IV Vancomycin    Vancomycin serum concentration assessment(s):    The random level was drawn correctly and can be used to guide therapy at this time. The measurement is above the desired definitive target range of 10 to 15 mcg/mL.    Vancomycin Regimen Plan:    Re-dose when the random level is less than 15 mcg/mL, next level to be drawn at 0400 on 1/21    Drug levels (last 3 results):  Recent Labs   Lab Result Units 01/19/22  1928 01/20/22  0508   Vancomycin, Random ug/mL  --  23.5   Vancomycin-Trough ug/mL 29.6*  --        Pharmacy will continue to follow and monitor vancomycin.    Please contact pharmacy at extension [phone] for questions regarding this assessment.    Thank you for the consult,   Teofilo Mora       Patient brief summary:  Suyapa Connelly is a 56 y.o. female initiated on antimicrobial therapy with IV Vancomycin for treatment of skin & soft tissue infection    The patient's current regimen is vancomycin dose by level    Drug Allergies:   Review of patient's allergies indicates:   Allergen Reactions    Ciprofloxacin Itching    Contrast media      Kidney injury    Iodine      Kidney injury       Actual Body Weight:   76.2kg    Renal Function:   Estimated Creatinine Clearance: 64.9 mL/min (based on SCr of 1 mg/dL).,     Dialysis Method (if applicable):      CBC (last 72 hours):  Recent Labs   Lab Result Units 01/18/22  0519 01/19/22 0445 01/20/22  0508   WBC K/uL 8.68 7.83 7.52   Hemoglobin g/dL 8.2* 8.2* 8.5*   Hematocrit % 26.1* 26.5* 27.3*   Platelets K/uL 357 353 333   Gran % % 64.8 62.3 64.2   Lymph % % 23.3 24.5 21.1   Mono % % 8.1 8.9 10.1   Eosinophil % % 3.0 3.4 3.6   Basophil % % 0.6 0.6 0.7   Differential Method  Automated Automated Automated       Metabolic Panel (last 72 hours):  Recent Labs   Lab Result Units 01/18/22  0519 01/19/22 0445 01/20/22  0508   Sodium mmol/L 137 137 138   Potassium mmol/L 3.5 3.5 3.8   Chloride mmol/L 106 105 106   CO2  mmol/L 20* 20* 23   Glucose mg/dL 176* 206* 185*   BUN mg/dL 23* 24* 30*   Creatinine mg/dL 1.1 1.1 1.0   Albumin g/dL 1.6*  --   --    Total Bilirubin mg/dL 0.2  --   --    Alkaline Phosphatase U/L 100  --   --    AST U/L 14  --   --    ALT U/L 13  --   --        Vancomycin Administrations:  vancomycin given in the last 96 hours                     vancomycin 1.5 g in dextrose 5 % 250 mL IVPB (ready to mix) (mg) 1,500 mg New Bag 01/18/22 2056    vancomycin (VANCOCIN) 1,750 mg in dextrose 5 % 500 mL IVPB (mg) 1,750 mg New Bag 01/17/22 2248                    Microbiologic Results:  Microbiology Results (last 7 days)       Procedure Component Value Units Date/Time    Blood culture [[id number]] Collected: 01/19/22 0848    Order Status: Completed Specimen: Blood from Antecubital, Left Arm Updated: 01/20/22 1612     Blood Culture, Routine No Growth to date      No Growth to date    Blood culture [[id number]] Collected: 01/19/22 0848    Order Status: Completed Specimen: Blood from Antecubital, Right Arm Updated: 01/20/22 1612     Blood Culture, Routine No Growth to date      No Growth to date    Narrative:      BC x 2, 30 min apart    Culture, Anaerobe [[id number]] Collected: 01/20/22 1452    Order Status: Sent Specimen: Wound from Leg, Right Updated: 01/20/22 1525    Aerobic culture [[id number]] Collected: 01/20/22 1451    Order Status: Sent Specimen: Wound from Leg, Right Updated: 01/20/22 1524    Blood culture #2 **CANNOT BE ORDERED STAT** [[id number]]  (Abnormal) Collected: 01/17/22 1548    Order Status: Completed Specimen: Blood from Peripheral, Hand, Right Updated: 01/20/22 1201     Blood Culture, Routine Gram stain salvatore bottle: Gram positive cocci in clusters resembling Staph       Results called to and read back by: Tita Tavarez RN 01/18/2022        19:36      Gram stain aer bottle: Gram positive cocci in clusters resembling Staph       Positive results previously called 01/19/2022  04:26      COAGULASE-NEGATIVE  STAPHYLOCOCCUS SPECIES  Organism is a probable contaminant      Aerobic culture (Specify Source) **CANNOT BE ORDERED AS STAT** [[id number]]  (Abnormal)  (Susceptibility) Collected: 01/17/22 1627    Order Status: Completed Specimen: Wound from Knee, Right Updated: 01/20/22 1022     Aerobic Bacterial Culture PSEUDOMONAS AERUGINOSA  Many        GRAM NEGATIVE YSABEL  Moderate  Identification and susceptibility pending      Blood culture #1 **CANNOT BE ORDERED STAT** [[id number]] Collected: 01/17/22 1548    Order Status: Completed Specimen: Blood from Peripheral, Hand, Right Updated: 01/19/22 2012     Blood Culture, Routine No Growth to date      No Growth to date      No Growth to date

## 2022-01-20 NOTE — ASSESSMENT & PLAN NOTE
Hemoglobin A1C   Date Value Ref Range Status   12/03/2021 9.6 (H) 4.0 - 5.6 % Final     Comment:     ADA Screening Guidelines:  5.7-6.4%  Consistent with prediabetes  >or=6.5%  Consistent with diabetes    High levels of fetal hemoglobin interfere with the HbA1C  assay. Heterozygous hemoglobin variants (HbS, HgC, etc)do  not significantly interfere with this assay.   However, presence of multiple variants may affect accuracy.                                       Patient's FSGs are uncontrolled due to hyperglycemia on current medication regimen.  - home regimen includes detemir, aspart  - hold home oral medications  - increased scheduled doses: detemir 10U qhs, aspart 2U tid plus scale will increase to 3U tid. Controlled.  - LDSSI with ACCUcheck ACHS  - Diabetic diet  -Hypoglycemia protocol PRN

## 2022-01-20 NOTE — PLAN OF CARE
Progressing toward goals cont POC    Problem: Occupational Therapy Goal  Goal: Occupational Therapy Goal  Description: Goals to be met by: 2/18     Patient will increase functional independence with ADLs by performing:    UE Dressing with Stand-by Assistance.  LE Dressing with Minimal Assistance.  Grooming while seated with Modified [place].  Toilet transfer to bedside commode with Moderate Assistance.  Upper extremity exercise program x10 reps per handout, with independence.    Outcome: Ongoing, Progressing

## 2022-01-20 NOTE — PT/OT/SLP PROGRESS
Occupational Therapy   Treatment    Name: Suyapa Connelly  MRN: [id number]  Admitting Diagnosis:  Infected wound       Recommendations:     Discharge Recommendations: rehabilitation facility  Discharge Equipment Recommendations:  other (see comments) (TBD)  Barriers to discharge:  None    Assessment:     Suyapa Connelly is a 56 y.o. female with a medical diagnosis of Infected wound.  She presents with performance deficits affecting function are weakness,impaired endurance,impaired self care skills,impaired sensation,impaired balance,gait instability,impaired functional mobilty,decreased lower extremity function,decreased upper extremity function,pain,decreased safety awareness,impaired skin,decreased ROM.     Rehab Prognosis:  Good; patient would benefit from acute skilled OT services to address these deficits and reach maximum level of function.       Plan:     Patient to be seen 5 x/week to address the above listed problems via self-care/home management,therapeutic activities,therapeutic exercises  · Plan of Care Expires: 02/18/22  · Plan of Care Reviewed with: patient    Subjective     Pain/Comfort:  · Pain Rating 1: 8/10  · Location - Side 1: Right  · Location - Orientation 1: lower  · Location 1: leg  · Pain Addressed 1: Pre-medicate for activity,Reposition,Distraction,Cessation of Activity,Nurse notified  · Pain Rating Post-Intervention 1: 10/10    Objective:     Communicated with: nurse prior to session.  Patient found HOB elevated with bed alarm,PICC line upon OT entry to room.    General Precautions: Standard, fall   Orthopedic Precautions:    Braces:    Respiratory Status: Room air     Occupational Performance:     Bed Mobility:    · Patient completed Rolling/Turning to Right with stand by assistance  · Patient completed Scooting/Bridging with stand by assistance in bed  · Patient completed Supine to Sit with stand by assistance  · Patient completed Sit to Supine with stand by assistance     Functional  Mobility/Transfers:  · Patient completed Bed <> Chair Transfer using Beazy Board technique with min A to chair, max a of 2 back to bed with Beazy Board   · Functional Mobility: pushed self in WC mod I, slow pace      [facility] 6 Click ADL: 17    Treatment & Education:  Pt up to WC via Beazy Board as above.  Educated pt on pressure relief tech of arm chair push up, lateral wt shift, hook arm on back of chair and also frequency of pressure relief tech.    Patient left HOB elevated with all lines intact, call button in reach, bed alarm on and nurse notifiedEducation:      GOALS:   Multidisciplinary Problems     Occupational Therapy Goals        Problem: Occupational Therapy Goal    Goal Priority Disciplines Outcome Interventions   Occupational Therapy Goal     OT, PT/OT Ongoing, Progressing    Description: Goals to be met by: 2/18     Patient will increase functional independence with ADLs by performing:    UE Dressing with Stand-by Assistance.  LE Dressing with Minimal Assistance.  Grooming while seated with Modified [place].  Toilet transfer to bedside commode with Moderate Assistance.  Upper extremity exercise program x10 reps per handout, with independence.                     Time Tracking:     OT Date of Treatment: 01/20/22  OT Start Time: 0925  OT Stop Time: 1000  OT Total Time (min): 35 min    Billable Minutes:Therapeutic Activity 15               1/20/2022

## 2022-01-20 NOTE — SUBJECTIVE & OBJECTIVE
Interval History:  no acute events overnight, no new complaints.  Reviewed updated cultures and insulin doses with the patient.    Review of Systems   Constitutional: Negative for chills, diaphoresis, fatigue and fever.   HENT: Negative for hearing loss and sore throat.    Eyes: Negative for visual disturbance.   Respiratory: Negative for cough and shortness of breath.    Cardiovascular: Negative for chest pain and leg swelling.   Gastrointestinal: Negative for abdominal pain, diarrhea, nausea and vomiting.   Genitourinary: Negative for difficulty urinating and flank pain.   Musculoskeletal: Negative for back pain.   Skin: Positive for wound. Negative for rash.   Neurological: Negative for dizziness, weakness and headaches.   Psychiatric/Behavioral: Negative for agitation and confusion.     Objective:     Vital Signs (Most Recent):  Temp: 98.5 °F (36.9 °C) (01/20/22 1557)  Pulse: 93 (01/20/22 1557)  Resp: 18 (01/20/22 1557)  BP: (!) 154/65 (01/20/22 1557)  SpO2: 100 % (01/20/22 1557) Vital Signs (24h Range):  Temp:  [97.2 °F (36.2 °C)-98.6 °F (37 °C)] 98.5 °F (36.9 °C)  Pulse:  [85-98] 93  Resp:  [16-18] 18  SpO2:  [97 %-100 %] 100 %  BP: (147-189)/(65-88) 154/65     Weight: 76.2 kg (167 lb 15.9 oz)  Body mass index is 27.96 kg/m².    Intake/Output Summary (Last 24 hours) at 1/20/2022 1630  Last data filed at 1/20/2022 0436  Gross per 24 hour   Intake 1343.53 ml   Output 825 ml   Net 518.53 ml      Physical Exam  Vitals and nursing note reviewed.   Constitutional:       General: She is not in acute distress.     Appearance: She is obese. She is not ill-appearing.   HENT:      Head: Normocephalic and atraumatic.      Mouth/Throat:      Mouth: Mucous membranes are moist.   Eyes:      Extraocular Movements: Extraocular movements intact.      Pupils: Pupils are equal, round, and reactive to light.   Cardiovascular:      Rate and Rhythm: Normal rate and regular rhythm.      Pulses: Normal pulses.      Heart sounds:  Normal heart sounds.   Pulmonary:      Effort: Pulmonary effort is normal.   Abdominal:      General: Bowel sounds are normal.      Palpations: Abdomen is soft.   Musculoskeletal:         General: No swelling.      Cervical back: Normal range of motion and neck supple.      Right Lower Extremity: Right leg is amputated below knee.      Left Lower Extremity: Left leg is amputated below knee.   Feet:      Right foot:      Skin integrity: Erythema and warmth present.      Comments: S/p right BKA 12/28.  Staples intact. Wound dehisced, primary to lateral surface.  Serous colored drainage noted throughout bandages with small amount of active drainage from wound.  Eschar noted primarily to lateral surface. Mild surrounding erythema.  Warmth to touch. See images below  Left BKA at baseline, uncomplicated.  No open wounds.  Skin:     General: Skin is warm and dry.      Capillary Refill: Capillary refill takes less than 2 seconds.      Comments: unstageable sacral decubitus   Neurological:      General: No focal deficit present.      Mental Status: She is alert and oriented to person, place, and time.   Psychiatric:         Mood and Affect: Mood normal.         Behavior: Behavior normal. Behavior is cooperative.         Significant Labs: All pertinent labs within the past 24 hours have been reviewed.    Significant Imaging: I have reviewed all pertinent imaging results/findings within the past 24 hours.

## 2022-01-20 NOTE — ASSESSMENT & PLAN NOTE
S/p right bka with Dr. Rojas on 12/28/21  Presents to ED with concerns for wound infection  BKA site with dehisced wound  Received zosyn x1  Vancomycin and cefazolin initiated, cultures pseudomonas.  Cefazolin changed to cefepime and Vanc d/c'd.  General surgery consulted for possible debridement: check cultures , apply santyl , xeroform , 4 x 4 , kerlix , and ace bandage daily, iv antibiotics  ID consulted for recommendations on antibiotic therapy  PT/OT: rehab

## 2022-01-20 NOTE — PROGRESS NOTES
[facility] Medicine  Progress Note    Patient Name: Suyapa Connelly  MRN: [id number]  Patient Class: IP- Inpatient   Admission Date: 1/17/2022  Length of Stay: 0 days  Attending Physician: Obinna Marr DO  Primary Care Provider: Magen Christensen MD        Subjective:     Principal Problem:Infected wound        HPI:  Suyapa Connelly is a 57 y/o female with PMHx CKD3, DMII, GERD, HLD, HTN, osteomyelitis, PVD, depression, and anxiety presents to ED with  with continue concern for wound infection.  Patient underwent right BKA by Dr. Rojas on 12/28 then discharged to rehab facility on 1/10.  Rehab facility staff became concerned for possible wound infection and patient was sent back to ED on 1/11.  Lab work was obtained, and images of wound were reviewed with Dr. Rojas, who recommended continued outpatient management.  At discharge patient was prescribed Augmentin, which she has continued taking as instructed.   at bedside is concern for progressively worsening appearance of wound, along with continued drainage.  Patient reporting worsened pain today described as sharp, worse with touch, radiating towards right knee, severity 8/10.  No fevers, chills, nausea, vomiting, chest pain, cough, shortness of breath, abdominal pain, urinary or bowel complications.  She does admit to not taking her BP medication today.  No other acute complaints at this time. Will admit to hospital medicine for observation services. General surgery consulted.       Overview/Hospital Course:  Pt placed in observation for evaluation of wound infection.  Pt s/p R BKA with Dr. Rojas on 12/28/21.  Vanc and cefazolin initiated.  Wound culture presumptive pseudomonas species cefazolin discontinued and changed to cefepime.  UC candida and BC 1 of 2 bottles coag-neg staph suspected contaminant.  Gen Surg consulted and recommended santyl, xeroform, 4 x 4, kerlix and ace bandage daily.  Wound culture pseudomonas  sensitive to cefepime.    PT/OT recommended rehab facility.  WC consulted and unstageable sacral decub.  Recommended waffle overlay and triad ointment to L buttocks BID and prn incontinent episode.      Interval History:  no acute events overnight, no new complaints.  Reviewed updated cultures and insulin doses with the patient.    Review of Systems   Constitutional: Negative for chills, diaphoresis, fatigue and fever.   HENT: Negative for hearing loss and sore throat.    Eyes: Negative for visual disturbance.   Respiratory: Negative for cough and shortness of breath.    Cardiovascular: Negative for chest pain and leg swelling.   Gastrointestinal: Negative for abdominal pain, diarrhea, nausea and vomiting.   Genitourinary: Negative for difficulty urinating and flank pain.   Musculoskeletal: Negative for back pain.   Skin: Positive for wound. Negative for rash.   Neurological: Negative for dizziness, weakness and headaches.   Psychiatric/Behavioral: Negative for agitation and confusion.     Objective:     Vital Signs (Most Recent):  Temp: 98.5 °F (36.9 °C) (01/20/22 1557)  Pulse: 93 (01/20/22 1557)  Resp: 18 (01/20/22 1557)  BP: (!) 154/65 (01/20/22 1557)  SpO2: 100 % (01/20/22 1557) Vital Signs (24h Range):  Temp:  [97.2 °F (36.2 °C)-98.6 °F (37 °C)] 98.5 °F (36.9 °C)  Pulse:  [85-98] 93  Resp:  [16-18] 18  SpO2:  [97 %-100 %] 100 %  BP: (147-189)/(65-88) 154/65     Weight: 76.2 kg (167 lb 15.9 oz)  Body mass index is 27.96 kg/m².    Intake/Output Summary (Last 24 hours) at 1/20/2022 1630  Last data filed at 1/20/2022 0436  Gross per 24 hour   Intake 1343.53 ml   Output 825 ml   Net 518.53 ml      Physical Exam  Vitals and nursing note reviewed.   Constitutional:       General: She is not in acute distress.     Appearance: She is obese. She is not ill-appearing.   HENT:      Head: Normocephalic and atraumatic.      Mouth/Throat:      Mouth: Mucous membranes are moist.   Eyes:      Extraocular Movements: Extraocular  movements intact.      Pupils: Pupils are equal, round, and reactive to light.   Cardiovascular:      Rate and Rhythm: Normal rate and regular rhythm.      Pulses: Normal pulses.      Heart sounds: Normal heart sounds.   Pulmonary:      Effort: Pulmonary effort is normal.   Abdominal:      General: Bowel sounds are normal.      Palpations: Abdomen is soft.   Musculoskeletal:         General: No swelling.      Cervical back: Normal range of motion and neck supple.      Right Lower Extremity: Right leg is amputated below knee.      Left Lower Extremity: Left leg is amputated below knee.   Feet:      Right foot:      Skin integrity: Erythema and warmth present.      Comments: S/p right BKA 12/28.  Staples intact. Wound dehisced, primary to lateral surface.  Serous colored drainage noted throughout bandages with small amount of active drainage from wound.  Eschar noted primarily to lateral surface. Mild surrounding erythema.  Warmth to touch. See images below  Left BKA at baseline, uncomplicated.  No open wounds.  Skin:     General: Skin is warm and dry.      Capillary Refill: Capillary refill takes less than 2 seconds.      Comments: unstageable sacral decubitus   Neurological:      General: No focal deficit present.      Mental Status: She is alert and oriented to person, place, and time.   Psychiatric:         Mood and Affect: Mood normal.         Behavior: Behavior normal. Behavior is cooperative.         Significant Labs: All pertinent labs within the past 24 hours have been reviewed.    Significant Imaging: I have reviewed all pertinent imaging results/findings within the past 24 hours.      Assessment/Plan:      * Infected wound  S/p right bka with Dr. Rojas on 12/28/21  Presents to ED with concerns for wound infection  BKA site with dehisced wound  Received zosyn x1  Vancomycin and cefazolin initiated, cultures pseudomonas.  Cefazolin changed to cefepime and Vanc d/c'd.  General surgery consulted for possible  debridement: check cultures , apply santyl , xeroform , 4 x 4 , kerlix , and ace bandage daily, iv antibiotics  ID consulted for recommendations on antibiotic therapy  PT/OT: rehab      Decubitus ulcer of sacral region, unstageable  Wound Care assistance appreciated  Recommended waffle overlay and triad ointment to L buttocks BID and prn incontinent episode.    S/P BKA (below knee amputation) unilateral, right  See above      Phantom limb syndrome with pain  Resume home lyrica  Norco q4hr prn     Type 2 diabetes mellitus with diabetic nephropathy, with long-term current use of insulin  Hemoglobin A1C   Date Value Ref Range Status   12/03/2021 9.6 (H) 4.0 - 5.6 % Final     Comment:     ADA Screening Guidelines:  5.7-6.4%  Consistent with prediabetes  >or=6.5%  Consistent with diabetes    High levels of fetal hemoglobin interfere with the HbA1C  assay. Heterozygous hemoglobin variants (HbS, HgC, etc)do  not significantly interfere with this assay.   However, presence of multiple variants may affect accuracy.                                       Patient's FSGs are uncontrolled due to hyperglycemia on current medication regimen.  - home regimen includes detemir, aspart  - hold home oral medications  - increased scheduled doses: detemir 10U qhs, aspart 2U tid plus scale will increase to 3U tid. Controlled.  - LDSSI with ACCUcheck ACHS  - Diabetic diet  -Hypoglycemia protocol PRN        Paroxysmal atrial fibrillation  Patient with paroxymal afib  Resumed home OAC, rate controlled  Monitor on telemetry          Mixed hyperlipidemia  Last LDL was   Lab Results   Component Value Date    LDLCALC 177.6 (H) 12/03/2021      Patient is chronically on statin.will continue for now.   Monitor clinically.  Lipid panel in am        MDD (major depressive disorder), recurrent episode, moderate  Resume zoloft      Peripheral vascular disease  Resume statin       Chronic anemia  Stable and at baseline        VTE Risk Mitigation (From  admission, onward)         Ordered     apixaban tablet 5 mg  2 times daily         01/17/22 1920     IP VTE HIGH RISK PATIENT  Once         01/17/22 1658     Place sequential compression device  Until discontinued         01/17/22 1658                Discharge Planning   DEVAN:      Code Status: Full Code   Is the patient medically ready for discharge?:     Reason for patient still in hospital (select all that apply): Patient trending condition and Pending disposition  Discharge Plan A: Rehab                  Shanice Duvall PA-C  [facility]   [facility]

## 2022-01-20 NOTE — PLAN OF CARE
Medications given per mar. Plan of care reviewed with patient. Blood glucose checked as ordered. Trough level drawn for Vancomycin and dose held as trough level was elevated.  Blood glucose checked as ordered. Incontinence care provided and Triad cream place to left buttock prn.  Spouse remains at the bed side and very involved in patient's care. Safety maintained, call light in reach, bed alarm in use.

## 2022-01-20 NOTE — PROGRESS NOTES
IP Liaison - Initial Visit Note    Patient: Suyapa Connelly  MRN:  [id number]  Date of Service:  1/20/2022  Completed by:  JENNIFER Lazo    Reason for Visit   Patient presents with    IP Liaison Initial Visit       Pt recently discharged from [facility] on 1/10/2022. RSW contacted pt via room phone in order to review SDOH questionnaire and liaison assessment. RSW notified pt about [employer] benefits. RSW provided pt with multiple resources during pt last hospital visit. Pt still has access to resources provided. Per pt, pt is not in need of any additional resources at this time.    The following were addressed during this visit:  - Review SDOH Questions   - Complete patient assessment   - Review and discuss options to provide financial support   - Review and discuss options for reducing daily stress   - Complete initial visit with patient        Patient Summary     IP Liaison Patient Assessment    General  Level of Caregiver support: Caregiver currently provides assistance  Have you had to make a decision between paying for any of the following in the last 2 months?: None  Transportation means: Family  Employment status: Disabled  Assessments  Was the PHQ Depression Screening completed this visit?: No  Was the CAROLIN-7 Screening completed this visit?: No         JENNIFER Lazo

## 2022-01-20 NOTE — PLAN OF CARE
Problem: Physical Therapy Goal  Goal: Physical Therapy Goal  Description: Goals to be met by: 22     Patient will increase functional independence with mobility by performin. Supine <> sit with Stand-by Assistance  2. Bed to chair transfer with Minimal Assistance using scoot transfer or slideboard/beasy board  3. Wheelchair propulsion x 50 feet with Stand-by Assistance using bilateral upper extremities MET 2022    Updated  3. Wheelchair propulsion x 150 feet with Stand-by Assistance using bilateral upper extremities    Outcome: Ongoing, Progressing     Pt making progress as she completed beasy board transfers from bed<>w/c this date. Pt required min A to transfer from bed>w/c to the left and max A x 2 to transfer from w/c>bed to the left. Pt propelled w/c ~50 ft x 2 with SBA.   Despite patient's co-morbidities, patient was living in community setting functioning at min A level for ADLs, and mobility prior to this event.  There is an expectation of returning to prior level of function to maintain independence thus avoiding readmission.  Patient's clinical condition meets full Inpatient Rehab (IPR) criteria; including the ability to actively participate in 3 hours of therapy.

## 2022-01-20 NOTE — SUBJECTIVE & OBJECTIVE
Interval History: No acute events    Review of Systems   Constitutional: Negative for chills, diaphoresis, fatigue and fever.   HENT: Negative for hearing loss and sore throat.    Eyes: Negative for visual disturbance.   Respiratory: Negative for cough and shortness of breath.    Cardiovascular: Negative for chest pain and leg swelling.   Gastrointestinal: Negative for abdominal pain, diarrhea, nausea and vomiting.   Genitourinary: Negative for difficulty urinating and flank pain.   Musculoskeletal: Negative for back pain.   Skin: Positive for wound. Negative for rash.   Neurological: Negative for dizziness, weakness and headaches.   Psychiatric/Behavioral: Negative for agitation and confusion.     Objective:     Vital Signs (Most Recent):  Temp: 97.8 °F (36.6 °C) (01/20/22 1200)  Pulse: 85 (01/20/22 1200)  Resp: 18 (01/20/22 1200)  BP: (!) 147/88 (01/20/22 1200)  SpO2: 100 % (01/20/22 1200) Vital Signs (24h Range):  Temp:  [97.2 °F (36.2 °C)-98.6 °F (37 °C)] 97.8 °F (36.6 °C)  Pulse:  [85-98] 85  Resp:  [16-18] 18  SpO2:  [97 %-100 %] 100 %  BP: (147-189)/(67-88) 147/88     Weight: 76.2 kg (167 lb 15.9 oz)  Body mass index is 27.96 kg/m².    Estimated Creatinine Clearance: 64.9 mL/min (based on SCr of 1 mg/dL).    Physical Exam  Vitals and nursing note reviewed.   Constitutional:       General: She is not in acute distress.     Appearance: She is obese. She is not ill-appearing.   HENT:      Head: Normocephalic and atraumatic.      Mouth/Throat:      Mouth: Mucous membranes are moist.   Eyes:      Extraocular Movements: Extraocular movements intact.      Pupils: Pupils are equal, round, and reactive to light.   Cardiovascular:      Rate and Rhythm: Normal rate and regular rhythm.      Pulses: Normal pulses.      Heart sounds: Normal heart sounds.   Pulmonary:      Effort: Pulmonary effort is normal.   Abdominal:      General: Bowel sounds are normal.      Palpations: Abdomen is soft.   Musculoskeletal:          General: No swelling.      Cervical back: Normal range of motion and neck supple.      Right Lower Extremity: Right leg is amputated below knee.      Left Lower Extremity: Left leg is amputated below knee.   Feet:      Right foot:      Skin integrity: Erythema and warmth present.      Comments: S/p right BKA 12/28.  Staples intact. Wound dehisced, primary to lateral surface.  Serous colored drainage noted throughout bandages with small amount of active drainage from wound.  Eschar noted primarily to lateral surface. Mild surrounding erythema.  Warmth to touch. See images below  Left BKA at baseline, uncomplicated.  No open wounds.  Skin:     General: Skin is warm and dry.      Capillary Refill: Capillary refill takes less than 2 seconds.      Comments: unstageable sacral decubitus   Neurological:      General: No focal deficit present.      Mental Status: She is alert and oriented to person, place, and time.   Psychiatric:         Mood and Affect: Mood normal.         Behavior: Behavior normal. Behavior is cooperative.         Significant Labs: All pertinent labs within the past 24 hours have been reviewed.    Significant Imaging: I have reviewed all pertinent imaging results/findings within the past 24 hours.

## 2022-01-21 LAB
ANION GAP SERPL CALC-SCNC: 10 MMOL/L (ref 8–16)
BACTERIA SPEC AEROBE CULT: ABNORMAL
BACTERIA SPEC AEROBE CULT: ABNORMAL
BASOPHILS # BLD AUTO: 0.06 K/UL (ref 0–0.2)
BASOPHILS NFR BLD: 0.8 % (ref 0–1.9)
BUN SERPL-MCNC: 34 MG/DL (ref 6–20)
CALCIUM SERPL-MCNC: 8.6 MG/DL (ref 8.7–10.5)
CHLORIDE SERPL-SCNC: 105 MMOL/L (ref 95–110)
CO2 SERPL-SCNC: 20 MMOL/L (ref 23–29)
CREAT SERPL-MCNC: 1.1 MG/DL (ref 0.5–1.4)
DIFFERENTIAL METHOD: ABNORMAL
EOSINOPHIL # BLD AUTO: 0.3 K/UL (ref 0–0.5)
EOSINOPHIL NFR BLD: 4.1 % (ref 0–8)
ERYTHROCYTE [DISTWIDTH] IN BLOOD BY AUTOMATED COUNT: 14.6 % (ref 11.5–14.5)
EST. GFR  (AFRICAN AMERICAN): >60 ML/MIN/1.73 M^2
EST. GFR  (NON AFRICAN AMERICAN): 57 ML/MIN/1.73 M^2
GLUCOSE SERPL-MCNC: 201 MG/DL (ref 70–110)
HCT VFR BLD AUTO: 25.2 % (ref 37–48.5)
HGB BLD-MCNC: 7.8 G/DL (ref 12–16)
IMM GRANULOCYTES # BLD AUTO: 0.03 K/UL (ref 0–0.04)
IMM GRANULOCYTES NFR BLD AUTO: 0.4 % (ref 0–0.5)
LYMPHOCYTES # BLD AUTO: 1.7 K/UL (ref 1–4.8)
LYMPHOCYTES NFR BLD: 23.1 % (ref 18–48)
MAGNESIUM SERPL-MCNC: 1.7 MG/DL (ref 1.6–2.6)
MCH RBC QN AUTO: 29.9 PG (ref 27–31)
MCHC RBC AUTO-ENTMCNC: 31 G/DL (ref 32–36)
MCV RBC AUTO: 97 FL (ref 82–98)
MONOCYTES # BLD AUTO: 0.8 K/UL (ref 0.3–1)
MONOCYTES NFR BLD: 11 % (ref 4–15)
NEUTROPHILS # BLD AUTO: 4.6 K/UL (ref 1.8–7.7)
NEUTROPHILS NFR BLD: 60.6 % (ref 38–73)
NRBC BLD-RTO: 0 /100 WBC
PLATELET # BLD AUTO: 318 K/UL (ref 150–450)
PMV BLD AUTO: 11.2 FL (ref 9.2–12.9)
POCT GLUCOSE: 177 MG/DL (ref 70–110)
POCT GLUCOSE: 208 MG/DL (ref 70–110)
POCT GLUCOSE: 264 MG/DL (ref 70–110)
POCT GLUCOSE: 295 MG/DL (ref 70–110)
POTASSIUM SERPL-SCNC: 3.5 MMOL/L (ref 3.5–5.1)
RBC # BLD AUTO: 2.61 M/UL (ref 4–5.4)
SODIUM SERPL-SCNC: 135 MMOL/L (ref 136–145)
VANCOMYCIN SERPL-MCNC: 18.1 UG/ML
WBC # BLD AUTO: 7.53 K/UL (ref 3.9–12.7)

## 2022-01-21 PROCEDURE — 36415 COLL VENOUS BLD VENIPUNCTURE: CPT | Mod: HCNC | Performed by: INTERNAL MEDICINE

## 2022-01-21 PROCEDURE — 94761 N-INVAS EAR/PLS OXIMETRY MLT: CPT | Mod: HCNC

## 2022-01-21 PROCEDURE — 27000207 HC ISOLATION: Mod: HCNC

## 2022-01-21 PROCEDURE — 25000003 PHARM REV CODE 250: Mod: HCNC | Performed by: INTERNAL MEDICINE

## 2022-01-21 PROCEDURE — 25000003 PHARM REV CODE 250: Mod: HCNC

## 2022-01-21 PROCEDURE — 63600175 PHARM REV CODE 636 W HCPCS: Mod: HCNC | Performed by: INTERNAL MEDICINE

## 2022-01-21 PROCEDURE — 83735 ASSAY OF MAGNESIUM: CPT | Mod: HCNC | Performed by: PHYSICIAN ASSISTANT

## 2022-01-21 PROCEDURE — 11000001 HC ACUTE MED/SURG PRIVATE ROOM: Mod: HCNC

## 2022-01-21 PROCEDURE — 25000003 PHARM REV CODE 250: Mod: HCNC | Performed by: PHYSICIAN ASSISTANT

## 2022-01-21 PROCEDURE — 85025 COMPLETE CBC W/AUTO DIFF WBC: CPT | Mod: HCNC | Performed by: PHYSICIAN ASSISTANT

## 2022-01-21 PROCEDURE — 97110 THERAPEUTIC EXERCISES: CPT | Mod: HCNC

## 2022-01-21 PROCEDURE — 97530 THERAPEUTIC ACTIVITIES: CPT | Mod: HCNC

## 2022-01-21 PROCEDURE — 63600175 PHARM REV CODE 636 W HCPCS: Mod: HCNC | Performed by: PHYSICIAN ASSISTANT

## 2022-01-21 PROCEDURE — 80048 BASIC METABOLIC PNL TOTAL CA: CPT | Mod: HCNC | Performed by: PHYSICIAN ASSISTANT

## 2022-01-21 PROCEDURE — 80202 ASSAY OF VANCOMYCIN: CPT | Mod: HCNC | Performed by: INTERNAL MEDICINE

## 2022-01-21 RX ORDER — INSULIN ASPART 100 [IU]/ML
5 INJECTION, SOLUTION INTRAVENOUS; SUBCUTANEOUS
Status: DISCONTINUED | OUTPATIENT
Start: 2022-01-21 | End: 2022-01-30 | Stop reason: HOSPADM

## 2022-01-21 RX ORDER — POTASSIUM CHLORIDE 20 MEQ/1
40 TABLET, EXTENDED RELEASE ORAL ONCE
Status: COMPLETED | OUTPATIENT
Start: 2022-01-21 | End: 2022-01-21

## 2022-01-21 RX ORDER — LANOLIN ALCOHOL/MO/W.PET/CERES
800 CREAM (GRAM) TOPICAL 2 TIMES DAILY
Status: COMPLETED | OUTPATIENT
Start: 2022-01-21 | End: 2022-01-22

## 2022-01-21 RX ORDER — SODIUM CHLORIDE 9 MG/ML
INJECTION, SOLUTION INTRAVENOUS
Status: DISCONTINUED | OUTPATIENT
Start: 2022-01-21 | End: 2022-01-30 | Stop reason: HOSPADM

## 2022-01-21 RX ADMIN — INSULIN ASPART 1 UNITS: 100 INJECTION, SOLUTION INTRAVENOUS; SUBCUTANEOUS at 08:01

## 2022-01-21 RX ADMIN — POTASSIUM CHLORIDE 40 MEQ: 1500 TABLET, EXTENDED RELEASE ORAL at 01:01

## 2022-01-21 RX ADMIN — SODIUM BICARBONATE 650 MG: 650 TABLET ORAL at 08:01

## 2022-01-21 RX ADMIN — SODIUM CHLORIDE: 0.9 INJECTION, SOLUTION INTRAVENOUS at 12:01

## 2022-01-21 RX ADMIN — INSULIN ASPART 5 UNITS: 100 INJECTION, SOLUTION INTRAVENOUS; SUBCUTANEOUS at 05:01

## 2022-01-21 RX ADMIN — APIXABAN 5 MG: 5 TABLET, FILM COATED ORAL at 08:01

## 2022-01-21 RX ADMIN — CEFEPIME HYDROCHLORIDE 1 G: 1 INJECTION, SOLUTION INTRAVENOUS at 01:01

## 2022-01-21 RX ADMIN — PREGABALIN 150 MG: 75 CAPSULE ORAL at 04:01

## 2022-01-21 RX ADMIN — SODIUM BICARBONATE 650 MG: 650 TABLET ORAL at 09:01

## 2022-01-21 RX ADMIN — HYDROCODONE BITARTRATE AND ACETAMINOPHEN 1 TABLET: 5; 325 TABLET ORAL at 12:01

## 2022-01-21 RX ADMIN — CEFEPIME HYDROCHLORIDE 1 G: 1 INJECTION, SOLUTION INTRAVENOUS at 05:01

## 2022-01-21 RX ADMIN — CEFEPIME HYDROCHLORIDE 1 G: 1 INJECTION, SOLUTION INTRAVENOUS at 08:01

## 2022-01-21 RX ADMIN — HYDROCODONE BITARTRATE AND ACETAMINOPHEN 1 TABLET: 5; 325 TABLET ORAL at 06:01

## 2022-01-21 RX ADMIN — COLLAGENASE SANTYL: 250 OINTMENT TOPICAL at 04:01

## 2022-01-21 RX ADMIN — ATORVASTATIN CALCIUM 80 MG: 40 TABLET, FILM COATED ORAL at 08:01

## 2022-01-21 RX ADMIN — ALLOPURINOL 100 MG: 100 TABLET ORAL at 09:01

## 2022-01-21 RX ADMIN — Medication 800 MG: at 01:01

## 2022-01-21 RX ADMIN — INSULIN ASPART 2 UNITS: 100 INJECTION, SOLUTION INTRAVENOUS; SUBCUTANEOUS at 06:01

## 2022-01-21 RX ADMIN — HYDROCODONE BITARTRATE AND ACETAMINOPHEN 1 TABLET: 5; 325 TABLET ORAL at 08:01

## 2022-01-21 RX ADMIN — HYDROCODONE BITARTRATE AND ACETAMINOPHEN 1 TABLET: 5; 325 TABLET ORAL at 04:01

## 2022-01-21 RX ADMIN — APIXABAN 5 MG: 5 TABLET, FILM COATED ORAL at 09:01

## 2022-01-21 RX ADMIN — CLOPIDOGREL 75 MG: 75 TABLET, FILM COATED ORAL at 09:01

## 2022-01-21 RX ADMIN — MEROPENEM 1 G: 1 INJECTION, POWDER, FOR SOLUTION INTRAVENOUS at 10:01

## 2022-01-21 RX ADMIN — SERTRALINE HYDROCHLORIDE 50 MG: 50 TABLET ORAL at 09:01

## 2022-01-21 RX ADMIN — MELATONIN TAB 3 MG 6 MG: 3 TAB at 08:01

## 2022-01-21 RX ADMIN — Medication 800 MG: at 08:01

## 2022-01-21 RX ADMIN — VANCOMYCIN HYDROCHLORIDE 500 MG: 500 INJECTION, POWDER, LYOPHILIZED, FOR SOLUTION INTRAVENOUS at 12:01

## 2022-01-21 RX ADMIN — PREGABALIN 150 MG: 75 CAPSULE ORAL at 08:01

## 2022-01-21 RX ADMIN — PREGABALIN 150 MG: 75 CAPSULE ORAL at 09:01

## 2022-01-21 RX ADMIN — FUROSEMIDE 40 MG: 40 TABLET ORAL at 09:01

## 2022-01-21 RX ADMIN — INSULIN ASPART 3 UNITS: 100 INJECTION, SOLUTION INTRAVENOUS; SUBCUTANEOUS at 12:01

## 2022-01-21 RX ADMIN — MIRTAZAPINE 15 MG: 7.5 TABLET ORAL at 08:01

## 2022-01-21 RX ADMIN — INSULIN ASPART 5 UNITS: 100 INJECTION, SOLUTION INTRAVENOUS; SUBCUTANEOUS at 12:01

## 2022-01-21 NOTE — PROGRESS NOTES
"[facility] - Med Surg  Adult Nutrition  Progress Note    SUMMARY       Recommendations    Recommendation:   1. Encourage intake of meals & supplements as tolerated.   2. Monitor weight/labs.   3. RD to continue to follow to monitor po intake    Goals:   Pt will tolerate diet with at least 50-75% intake at meals by RD follow up  Nutrition Goal Status: progressing towards goal  Communication of RD Recs: reviewed with RN    Assessment and Plan  * Infected wound  Contributing Nutrition Diagnosis  Increased energy/protein needs    Related to (etiology):   Wound healing    Signs and Symptoms (as evidenced by):   Infected amputation stump    Interventions:  Collaboration with other providers  Commercial beverage  Increased protein diet    Nutrition Diagnosis Status:   Continues      Malnutrition Assessment  Subcutaneous Fat Loss (Final Summary): well nourished  Muscle Loss Evaluation (Final Summary): well nourished       Reason for Assessment  Reason For Assessment: RD follow-up  Diagnosis:  (infected wound)  Relevant Medical History: GERD, HTN, hyperlipemia, DM, MI, PVD, CKD, heart cath, CABG, thrombectomy, L AKA, R BKA  General Information Comments: Pt on 2000 ADA diet with beneprotein & Boost Glucose Control. Noted pt with good intake at recent meals. Carlos 15- R leg incision, L buttocks wound. L AKA. R BKA. NFPE completed 1/18-nourished. Weight stable.  Nutrition Discharge Planning: pt to d/c on ADA diet    Nutrition Risk Screen  Nutrition Risk Screen: large or nonhealing wound, burn or pressure injury    Nutrition/Diet History  Food Preferences: no Roman Catholic or cultural food prefs identified  Spiritual, Cultural Beliefs, Hinduism Practices, Values that Affect Care: no  Factors Affecting Nutritional Intake: depression    Anthropometrics  Temp: 97.6 °F (36.4 °C)  Height Method: Stated  Height: 5' 5" (165.1 cm)  Height (inches): 65 in  Weight Method: Bed Scale  Weight: 76.2 kg (167 lb 15.9 oz)  Weight (lb): 167.99 " lb  Ideal Body Weight (IBW), Female: 125 lb  % Ideal Body Weight, Female (lb): 134.39 %  BMI (Calculated): 28  BMI Grade: 25 - 29.9 - overweight  Amputation %: 5.9,16  Total Amputation %: 21.9  Amputation Ideal Body Weight (IBW), Female (lb): 103.1 lb  Amputee BMI (kg/m2): 35.89 kg/m2     Lab/Procedures/Meds  Pertinent Labs Reviewed: reviewed  Pertinent Labs Comments: Na 135L, BUN 34H, Glu 201H, Ca 8.6L, Alb 1.6L  Pertinent Medications Reviewed: reviewed  Pertinent Medications Comments: Lasix, insulin, vancomycin    Estimated/Assessed Needs  Weight Used For Calorie Calculations: 76.2 kg (167 lb 15.9 oz)  Energy Calorie Requirements (kcal): 1348-6131 (25-30kcal/kg with amputation %)  Energy Need Method: Kcal/kg  Protein Requirements: 72g (1.2g/kg with amputation %)  Weight Used For Protein Calculations: 76.2 kg (167 lb 15.9 oz)  Estimated Fluid Requirement Method: RDA Method  RDA Method (mL): 1488     Nutrition Prescription Ordered  Current Diet Order: 2000 ADA  Oral Nutrition Supplement: beneprotein, Boost Glucose    Evaluation of Received Nutrient/Fluid Intake  I/O: 436/400  Energy Calories Required: meeting needs  Protein Required: meeting needs  Fluid Required: meeting needs  Comments: LBM 1/19  % Intake of Estimated Energy Needs: 75 - 100 %  % Meal Intake: 75 - 100 %    Nutrition Risk  Level of Risk/Frequency of Follow-up:  (2xweekly)     Monitor and Evaluation  Food and Nutrient Intake: food and beverage intake  Food and Nutrient Adminstration: diet order  Physical Activity and Function: nutrition-related ADLs and IADLs  Anthropometric Measurements: weight  Biochemical Data, Medical Tests and Procedures: electrolyte and renal panel  Nutrition-Focused Physical Findings: overall appearance     Nutrition Follow-Up  RD Follow-up?: Yes

## 2022-01-21 NOTE — SUBJECTIVE & OBJECTIVE
Interval History:  no acute events overnight, no new complaints    Review of Systems   Constitutional: Negative for chills, diaphoresis, fatigue and fever.   HENT: Negative for hearing loss and sore throat.    Eyes: Negative for visual disturbance.   Respiratory: Negative for cough and shortness of breath.    Cardiovascular: Negative for chest pain and leg swelling.   Gastrointestinal: Negative for abdominal pain, diarrhea, nausea and vomiting.   Genitourinary: Negative for difficulty urinating and flank pain.   Musculoskeletal: Negative for back pain.   Skin: Positive for wound. Negative for rash.   Neurological: Negative for dizziness, weakness and headaches.   Psychiatric/Behavioral: Negative for agitation and confusion.     Objective:     Vital Signs (Most Recent):  Temp: 98.3 °F (36.8 °C) (01/21/22 1127)  Pulse: 92 (01/21/22 1127)  Resp: 20 (01/21/22 1127)  BP: (!) 142/64 (01/21/22 1127)  SpO2: 99 % (01/21/22 1148) Vital Signs (24h Range):  Temp:  [96.2 °F (35.7 °C)-98.5 °F (36.9 °C)] 98.3 °F (36.8 °C)  Pulse:  [84-94] 92  Resp:  [16-20] 20  SpO2:  [97 %-100 %] 99 %  BP: (124-179)/(59-88) 142/64     Weight: 76.2 kg (167 lb 15.9 oz)  Body mass index is 27.96 kg/m².    Intake/Output Summary (Last 24 hours) at 1/21/2022 1152  Last data filed at 1/20/2022 2343  Gross per 24 hour   Intake 436.22 ml   Output 400 ml   Net 36.22 ml      Physical Exam  Vitals and nursing note reviewed.   Constitutional:       General: She is not in acute distress.     Appearance: She is obese. She is not ill-appearing.   HENT:      Head: Normocephalic and atraumatic.      Mouth/Throat:      Mouth: Mucous membranes are moist.   Eyes:      Extraocular Movements: Extraocular movements intact.      Pupils: Pupils are equal, round, and reactive to light.   Cardiovascular:      Rate and Rhythm: Normal rate and regular rhythm.      Pulses: Normal pulses.      Heart sounds: Normal heart sounds.   Pulmonary:      Effort: Pulmonary effort is  normal.   Abdominal:      General: Bowel sounds are normal.      Palpations: Abdomen is soft.   Musculoskeletal:         General: No swelling.      Cervical back: Normal range of motion and neck supple.      Right Lower Extremity: Right leg is amputated below knee.      Left Lower Extremity: Left leg is amputated below knee.   Feet:      Right foot:      Skin integrity: No erythema or warmth.      Comments: S/p right BKA 12/28.  Staples intact. Wound dehisced, primary to lateral surface.  Eschar noted primarily to lateral surface. Mild surrounding erythema.    Left BKA at baseline, uncomplicated.  No open wounds.  Skin:     General: Skin is warm and dry.      Capillary Refill: Capillary refill takes less than 2 seconds.      Comments: unstageable sacral decubitus   Neurological:      General: No focal deficit present.      Mental Status: She is alert and oriented to person, place, and time.   Psychiatric:         Mood and Affect: Mood normal.         Behavior: Behavior normal. Behavior is cooperative.         Significant Labs: All pertinent labs within the past 24 hours have been reviewed.    Significant Imaging: I have reviewed all pertinent imaging results/findings within the past 24 hours.

## 2022-01-21 NOTE — PROGRESS NOTES
"Surgery follow up  BP (!) 142/64   Pulse 92   Temp 98.3 °F (36.8 °C)   Resp 15   Ht 5' 5" (1.651 m)   Wt 76.2 kg (167 lb 15.9 oz)   LMP 09/30/2015 (Exact Date)   SpO2 99%   BMI 27.96 kg/m²   I/O last 3 completed shifts:  In: 1241.2 [P.O.:1045; IV Piggyback:196.2]  Out: 1225 [Urine:1225]  I/O this shift:  In: 221.2 [P.O.:120; I.V.:1.2; IV Piggyback:100]  Out: -       Recent Results (from the past 336 hour(s))   CBC Auto Differential    Collection Time: 01/21/22  4:42 AM   Result Value Ref Range    WBC 7.53 3.90 - 12.70 K/uL    Hemoglobin 7.8 (L) 12.0 - 16.0 g/dL    Hematocrit 25.2 (L) 37.0 - 48.5 %    Platelets 318 150 - 450 K/uL   CBC Auto Differential    Collection Time: 01/20/22  5:08 AM   Result Value Ref Range    WBC 7.52 3.90 - 12.70 K/uL    Hemoglobin 8.5 (L) 12.0 - 16.0 g/dL    Hematocrit 27.3 (L) 37.0 - 48.5 %    Platelets 333 150 - 450 K/uL   CBC Auto Differential    Collection Time: 01/19/22  4:45 AM   Result Value Ref Range    WBC 7.83 3.90 - 12.70 K/uL    Hemoglobin 8.2 (L) 12.0 - 16.0 g/dL    Hematocrit 26.5 (L) 37.0 - 48.5 %    Platelets 353 150 - 450 K/uL   eating better , on PT/ot  Wound unchanged will evaluate Sunday again, continue on local treatment and iv antibiotics.  "

## 2022-01-21 NOTE — ASSESSMENT & PLAN NOTE
Last LDL was   Lab Results   Component Value Date    LDLCALC 177.6 (H) 12/03/2021      Patient is chronically on statin.will continue for now.

## 2022-01-21 NOTE — PT/OT/SLP PROGRESS
Physical Therapy      Patient Name:  Suyapa Connelly   MRN:  [id number]    Patient not seen today secondary to  (Pt soiled in AM and asleep from pain medication in PM). Will follow-up over wknd.

## 2022-01-21 NOTE — ASSESSMENT & PLAN NOTE
Hemoglobin A1C   Date Value Ref Range Status   12/03/2021 9.6 (H) 4.0 - 5.6 % Final     Comment:     ADA Screening Guidelines:  5.7-6.4%  Consistent with prediabetes  >or=6.5%  Consistent with diabetes    High levels of fetal hemoglobin interfere with the HbA1C  assay. Heterozygous hemoglobin variants (HbS, HgC, etc)do  not significantly interfere with this assay.   However, presence of multiple variants may affect accuracy.                                       Patient's FSGs are uncontrolled due to hyperglycemia on current medication regimen.  - home regimen includes detemir, aspart  - hold home oral medications  - increased scheduled doses: detemir 10U qhs, aspart 2U tid plus scale will increase to 5U tid. Controlled.  - LDSSI with ACCUcheck ACHS  - Diabetic diet  -Hypoglycemia protocol PRN

## 2022-01-21 NOTE — PLAN OF CARE
Patient aao x 3, plan of care reviewed and patient verbalized understanding. Medications given per MAR. Contact precautions maintained. Blood glucose checks completed and insulin given per sliding scale. Incontinences care provided, Triad cream placed to buttocks as ordered. Patient weight shifted in bed using pillows. Safety maintained, call light in reach, bed alarm in use.

## 2022-01-21 NOTE — PLAN OF CARE
Recommendation:   1. Encourage intake of meals & supplements as tolerated.   2. Monitor weight/labs.   3. RD to continue to follow to monitor po intake    Goals:   Pt will tolerate diet with at least 50-75% intake at meals by RD follow up  Nutrition Goal Status: progressing towards goal

## 2022-01-21 NOTE — PLAN OF CARE
Progressing toward goals  Cont POC    Problem: Occupational Therapy Goal  Goal: Occupational Therapy Goal  Description: Goals to be met by: 2/18     Patient will increase functional independence with ADLs by performing:    UE Dressing with Stand-by Assistance.  LE Dressing with Minimal Assistance.  Grooming while seated with Modified [place].  Toilet transfer to bedside commode with Moderate Assistance.  Upper extremity exercise program x10 reps per handout, with independence.    Outcome: Ongoing, Progressing

## 2022-01-21 NOTE — PROGRESS NOTES
[facility]  Progress Note    Patient Name: Suyapa Connelly  MRN: [id number]  Patient Class: IP- Inpatient   Admission Date: 1/17/2022  Length of Stay: 1 days  Attending Physician: Obinna Marr DO  Primary Care Provider: Magen Christensen MD        Subjective:     Principal Problem:Infected wound        HPI:  Suyapa Connelly is a 55 y/o female with PMHx CKD3, DMII, GERD, HLD, HTN, osteomyelitis, PVD, depression, and anxiety presents to ED with  with continue concern for wound infection.  Patient underwent right BKA by Dr. Rojas on 12/28 then discharged to rehab facility on 1/10.  Rehab facility staff became concerned for possible wound infection and patient was sent back to ED on 1/11.  Lab work was obtained, and images of wound were reviewed with Dr. Rojas, who recommended continued outpatient management.  At discharge patient was prescribed Augmentin, which she has continued taking as instructed.   at bedside is concern for progressively worsening appearance of wound, along with continued drainage.  Patient reporting worsened pain today described as sharp, worse with touch, radiating towards right knee, severity 8/10.  No fevers, chills, nausea, vomiting, chest pain, cough, shortness of breath, abdominal pain, urinary or bowel complications.  She does admit to not taking her BP medication today.  No other acute complaints at this time. Will admit to hospital medicine for observation services. General surgery consulted.       Overview/Hospital Course:  Pt placed in observation for evaluation of wound infection.  Pt s/p R BKA with Dr. Rojas on 12/28/21.  Vanc and cefazolin initiated.  Wound culture presumptive pseudomonas species cefazolin discontinued and changed to cefepime.  UC candida and BC 1 of 2 bottles coag-neg staph suspected contaminant.  Gen Surg consulted and recommended santyl, xeroform, 4 x 4, kerlix and ace bandage daily.  Wound culture pseudomonas  sensitive to cefepime, GNR with I&S pending.    PT/OT recommended rehab facility.  WC consulted and unstageable sacral decub.  Recommended waffle overlay and triad ointment to L buttocks BID and prn incontinent episode.      Interval History:  no acute events overnight, no new complaints    Review of Systems   Constitutional: Negative for chills, diaphoresis, fatigue and fever.   HENT: Negative for hearing loss and sore throat.    Eyes: Negative for visual disturbance.   Respiratory: Negative for cough and shortness of breath.    Cardiovascular: Negative for chest pain and leg swelling.   Gastrointestinal: Negative for abdominal pain, diarrhea, nausea and vomiting.   Genitourinary: Negative for difficulty urinating and flank pain.   Musculoskeletal: Negative for back pain.   Skin: Positive for wound. Negative for rash.   Neurological: Negative for dizziness, weakness and headaches.   Psychiatric/Behavioral: Negative for agitation and confusion.     Objective:     Vital Signs (Most Recent):  Temp: 98.3 °F (36.8 °C) (01/21/22 1127)  Pulse: 92 (01/21/22 1127)  Resp: 20 (01/21/22 1127)  BP: (!) 142/64 (01/21/22 1127)  SpO2: 99 % (01/21/22 1148) Vital Signs (24h Range):  Temp:  [96.2 °F (35.7 °C)-98.5 °F (36.9 °C)] 98.3 °F (36.8 °C)  Pulse:  [84-94] 92  Resp:  [16-20] 20  SpO2:  [97 %-100 %] 99 %  BP: (124-179)/(59-88) 142/64     Weight: 76.2 kg (167 lb 15.9 oz)  Body mass index is 27.96 kg/m².    Intake/Output Summary (Last 24 hours) at 1/21/2022 1152  Last data filed at 1/20/2022 2343  Gross per 24 hour   Intake 436.22 ml   Output 400 ml   Net 36.22 ml      Physical Exam  Vitals and nursing note reviewed.   Constitutional:       General: She is not in acute distress.     Appearance: She is obese. She is not ill-appearing.   HENT:      Head: Normocephalic and atraumatic.      Mouth/Throat:      Mouth: Mucous membranes are moist.   Eyes:      Extraocular Movements: Extraocular movements intact.      Pupils: Pupils are  equal, round, and reactive to light.   Cardiovascular:      Rate and Rhythm: Normal rate and regular rhythm.      Pulses: Normal pulses.      Heart sounds: Normal heart sounds.   Pulmonary:      Effort: Pulmonary effort is normal.   Abdominal:      General: Bowel sounds are normal.      Palpations: Abdomen is soft.   Musculoskeletal:         General: No swelling.      Cervical back: Normal range of motion and neck supple.      Right Lower Extremity: Right leg is amputated below knee.      Left Lower Extremity: Left leg is amputated below knee.   Feet:      Right foot:      Skin integrity: No erythema or warmth.      Comments: S/p right BKA 12/28.  Staples intact. Wound dehisced, primary to lateral surface.  Eschar noted primarily to lateral surface. Mild surrounding erythema.    Left BKA at baseline, uncomplicated.  No open wounds.  Skin:     General: Skin is warm and dry.      Capillary Refill: Capillary refill takes less than 2 seconds.      Comments: unstageable sacral decubitus   Neurological:      General: No focal deficit present.      Mental Status: She is alert and oriented to person, place, and time.   Psychiatric:         Mood and Affect: Mood normal.         Behavior: Behavior normal. Behavior is cooperative.         Significant Labs: All pertinent labs within the past 24 hours have been reviewed.    Significant Imaging: I have reviewed all pertinent imaging results/findings within the past 24 hours.      Assessment/Plan:      * Infected wound  S/p right bka with Dr. Rojas on 12/28/21  Presents to ED with concerns for wound infection  BKA site with dehisced wound  Received zosyn x1  Vancomycin and cefazolin initiated, cultures pseudomonas.  Cefazolin changed to cefepime and Vanc.  General surgery consulted for possible debridement: check cultures , apply santyl , xeroform , 4 x 4 , kerlix , and ace bandage daily, iv antibiotics  ID consulted for recommendations on antibiotic therapy: vanc, cefepime.   Await final cultures.  PT/OT: rehab      Decubitus ulcer of sacral region, unstageable  Wound Care assistance appreciated  Recommended waffle overlay and triad ointment to L buttocks BID and prn incontinent episode.    S/P BKA (below knee amputation) unilateral, right  See above      Phantom limb syndrome with pain  Resume home lyrica  Norco q4hr prn     Type 2 diabetes mellitus with diabetic nephropathy, with long-term current use of insulin  Hemoglobin A1C   Date Value Ref Range Status   12/03/2021 9.6 (H) 4.0 - 5.6 % Final     Comment:     ADA Screening Guidelines:  5.7-6.4%  Consistent with prediabetes  >or=6.5%  Consistent with diabetes    High levels of fetal hemoglobin interfere with the HbA1C  assay. Heterozygous hemoglobin variants (HbS, HgC, etc)do  not significantly interfere with this assay.   However, presence of multiple variants may affect accuracy.                                       Patient's FSGs are uncontrolled due to hyperglycemia on current medication regimen.  - home regimen includes detemir, aspart  - hold home oral medications  - increased scheduled doses: detemir 10U qhs, aspart 2U tid plus scale will increase to 5U tid. Controlled.  - LDSSI with ACCUcheck ACHS  - Diabetic diet  -Hypoglycemia protocol PRN        Paroxysmal atrial fibrillation  Patient with paroxymal afib  Resumed home OAC, rate controlled  Monitor on telemetry          Mixed hyperlipidemia  Last LDL was   Lab Results   Component Value Date    LDLCALC 177.6 (H) 12/03/2021      Patient is chronically on statin.will continue for now.             MDD (major depressive disorder), recurrent episode, moderate  Resumed zoloft, stable      Peripheral vascular disease  Resume statin       Chronic anemia  Stable and at baseline        VTE Risk Mitigation (From admission, onward)         Ordered     apixaban tablet 5 mg  2 times daily         01/17/22 1920     IP VTE HIGH RISK PATIENT  Once         01/17/22 1658     Place sequential  compression device  Until discontinued         01/17/22 1650                Discharge Planning   DEVAN:      Code Status: Full Code   Is the patient medically ready for discharge?:     Reason for patient still in hospital (select all that apply): Patient trending condition, Laboratory test and Consult recommendations  Discharge Plan A: Rehab                  Shanice Duvall PA-C  Department of Hospital Medicine   [facility]

## 2022-01-21 NOTE — ASSESSMENT & PLAN NOTE
S/p right bka with Dr. Rojas on 12/28/21  Presents to ED with concerns for wound infection  BKA site with dehisced wound  Received zosyn x1  Vancomycin and cefazolin initiated, cultures pseudomonas.  Cefazolin changed to cefepime and Vanc.  General surgery consulted for possible debridement: check cultures , apply santyl , xeroform , 4 x 4 , kerlix , and ace bandage daily, iv antibiotics  ID consulted for recommendations on antibiotic therapy: vanc, cefepime.  Await final cultures.  PT/OT: rehab

## 2022-01-21 NOTE — PROGRESS NOTES
Pharmacokinetic Assessment Follow Up: IV Vancomycin    Vancomycin serum concentration assessment(s):    The random level was drawn correctly and can be used to guide therapy at this time. The measurement is within the desired definitive target range of 10 to 15 mcg/mL.    Vancomycin Regimen Plan:    Vancomycin 500mg IV x1  Re-dose when the random level is less than 15 mcg/mL, next level to be drawn at 1100 on 1/22    Drug levels (last 3 results):  Recent Labs   Lab Result Units 01/19/22  1928 01/20/22  0508 01/21/22  0442   Vancomycin, Random ug/mL  --  23.5 18.1   Vancomycin-Trough ug/mL 29.6*  --   --        Pharmacy will continue to follow and monitor vancomycin.    Please contact pharmacy at extension [phone] for questions regarding this assessment.    Thank you for the consult,   eTofilo Mora       Patient brief summary:  Suyapa Connelly is a 56 y.o. female initiated on antimicrobial therapy with IV Vancomycin for treatment of skin & soft tissue infection    The patient's current regimen is dose by level    Drug Allergies:   Review of patient's allergies indicates:   Allergen Reactions    Ciprofloxacin Itching    Contrast media      Kidney injury    Iodine      Kidney injury       Actual Body Weight:   76.2kg    Renal Function:   Estimated Creatinine Clearance: 59 mL/min (based on SCr of 1.1 mg/dL).,     Dialysis Method (if applicable):      CBC (last 72 hours):  Recent Labs   Lab Result Units 01/19/22 0445 01/20/22  0508 01/21/22  0442   WBC K/uL 7.83 7.52 7.53   Hemoglobin g/dL 8.2* 8.5* 7.8*   Hematocrit % 26.5* 27.3* 25.2*   Platelets K/uL 353 333 318   Gran % % 62.3 64.2 60.6   Lymph % % 24.5 21.1 23.1   Mono % % 8.9 10.1 11.0   Eosinophil % % 3.4 3.6 4.1   Basophil % % 0.6 0.7 0.8   Differential Method  Automated Automated Automated       Metabolic Panel (last 72 hours):  Recent Labs   Lab Result Units 01/19/22 0445 01/20/22  0508 01/21/22  0442   Sodium mmol/L 137 138 135*   Potassium mmol/L 3.5 3.8 3.5    Chloride mmol/L 105 106 105   CO2 mmol/L 20* 23 20*   Glucose mg/dL 206* 185* 201*   BUN mg/dL 24* 30* 34*   Creatinine mg/dL 1.1 1.0 1.1   Magnesium mg/dL  --   --  1.7       Vancomycin Administrations:  vancomycin given in the last 96 hours                     vancomycin 1.5 g in dextrose 5 % 250 mL IVPB (ready to mix) (mg) 1,500 mg New Bag 01/18/22 2056    vancomycin (VANCOCIN) 1,750 mg in dextrose 5 % 500 mL IVPB (mg) 1,750 mg New Bag 01/17/22 2248                    Microbiologic Results:  Microbiology Results (last 7 days)       Procedure Component Value Units Date/Time    Aerobic culture [[id number]] Collected: 01/20/22 1451    Order Status: Completed Specimen: Wound from Leg, Right Updated: 01/21/22 0725     Aerobic Bacterial Culture No growth    Blood culture #1 **CANNOT BE ORDERED STAT** [[id number]] Collected: 01/17/22 7758    Order Status: Completed Specimen: Blood from Peripheral, Hand, Right Updated: 01/20/22 2012     Blood Culture, Routine No Growth to date      No Growth to date      No Growth to date      No Growth to date    Culture, Anaerobe [[id number]] Collected: 01/20/22 1452    Order Status: Sent Specimen: Wound from Leg, Right Updated: 01/20/22 1932    Blood culture [[id number]] Collected: 01/19/22 0848    Order Status: Completed Specimen: Blood from Antecubital, Left Arm Updated: 01/20/22 1612     Blood Culture, Routine No Growth to date      No Growth to date    Blood culture [[id number]] Collected: 01/19/22 0848    Order Status: Completed Specimen: Blood from Antecubital, Right Arm Updated: 01/20/22 1612     Blood Culture, Routine No Growth to date      No Growth to date    Narrative:      BC x 2, 30 min apart    Blood culture #2 **CANNOT BE ORDERED STAT** [[id number]]  (Abnormal) Collected: 01/17/22 1548    Order Status: Completed Specimen: Blood from Peripheral, Hand, Right Updated: 01/20/22 1201     Blood Culture, Routine Gram stain salvatore bottle: Gram positive cocci in clusters resembling  Staph       Results called to and read back by: Tita Tavarez RN 01/18/2022        19:36      Gram stain aer bottle: Gram positive cocci in clusters resembling Staph       Positive results previously called 01/19/2022  04:26      COAGULASE-NEGATIVE STAPHYLOCOCCUS SPECIES  Organism is a probable contaminant      Aerobic culture (Specify Source) **CANNOT BE ORDERED AS STAT** [[id number]]  (Abnormal)  (Susceptibility) Collected: 01/17/22 1627    Order Status: Completed Specimen: Wound from Knee, Right Updated: 01/20/22 1022     Aerobic Bacterial Culture PSEUDOMONAS AERUGINOSA  Many        GRAM NEGATIVE YSABEL  Moderate  Identification and susceptibility pending

## 2022-01-21 NOTE — PT/OT/SLP PROGRESS
Occupational Therapy   Treatment    Name: Suyapa Connelly  MRN: [id number]  Admitting Diagnosis:  Infected wound       Recommendations:     Discharge Recommendations: rehabilitation facility  Discharge Equipment Recommendations:  other (see comments) (TBD)  Barriers to discharge:  None    Assessment:     Suyapa Connelly is a 56 y.o. female with a medical diagnosis of Infected wound.  She presents with performance deficits affecting function are weakness,impaired endurance,impaired self care skills,impaired functional mobilty,gait instability,impaired balance,decreased lower extremity function,decreased upper extremity function,pain,decreased safety awareness,decreased ROM,impaired skin.     Rehab Prognosis:  Good; patient would benefit from acute skilled OT services to address these deficits and reach maximum level of function.       Plan:     Patient to be seen 5 x/week to address the above listed problems via self-care/home management,therapeutic activities,therapeutic exercises  · Plan of Care Expires: 02/18/22  · Plan of Care Reviewed with: patient    Subjective     Pain/Comfort:  · Pain Rating 1: 6/10  · Location - Side 1: Right  · Location - Orientation 1: lower  · Location 1: leg  · Pain Addressed 1: Nurse notified,Reposition,Distraction,Cessation of Activity  · Pain Rating Post-Intervention 1: 6/10    Objective:     Communicated with: nurse prior to session.  Patient found HOB elevated with bed alarm,PICC line upon OT entry to room.    General Precautions: Standard, fall,contact   Orthopedic Precautions:    Braces:    Respiratory Status: Room air     Occupational Performance:     Bed Mobility:    · Patient completed Scooting/Bridging with stand by assistance  · Patient completed Supine to Sit with stand by assistance  · Patient completed Sit to Supine with stand by assistance     Functional Mobility/Transfers:  · Patient completed Bed <> Chair Transfer using Beazy board technique with minimum assistance  with slide board and min-mod A back to bed  [facility] 6 Click ADL: 17    Treatment & Education:  Pt moved to  via Beazy Board min A. Performed BUE strengthening w/red Tband. Performed wt shift L, R, forward SBA and min cues. Performed slide board tf back to bed min-mod A and min cues for tech    Patient left HOB elevated with all lines intact, call button in reach, bed alarm on and nurse notifiedEducation:      GOALS:   Multidisciplinary Problems     Occupational Therapy Goals        Problem: Occupational Therapy Goal    Goal Priority Disciplines Outcome Interventions   Occupational Therapy Goal     OT, PT/OT Ongoing, Progressing    Description: Goals to be met by: 2/18     Patient will increase functional independence with ADLs by performing:    UE Dressing with Stand-by Assistance.  LE Dressing with Minimal Assistance.  Grooming while seated with Modified [place].  Toilet transfer to bedside commode with Moderate Assistance.  Upper extremity exercise program x10 reps per handout, with independence.                     Time Tracking:     OT Date of Treatment: 01/21/22  OT Start Time: 1413  OT Stop Time: 1453  OT Total Time (min): 40 min    Billable Minutes:Therapeutic Activity 25  Therapeutic Exercise 15               1/21/2022

## 2022-01-22 LAB
ANION GAP SERPL CALC-SCNC: 8 MMOL/L (ref 8–16)
BACTERIA BLD CULT: NORMAL
BASOPHILS # BLD AUTO: 0.09 K/UL (ref 0–0.2)
BASOPHILS NFR BLD: 1.2 % (ref 0–1.9)
BUN SERPL-MCNC: 46 MG/DL (ref 6–20)
CALCIUM SERPL-MCNC: 8.2 MG/DL (ref 8.7–10.5)
CHLORIDE SERPL-SCNC: 105 MMOL/L (ref 95–110)
CO2 SERPL-SCNC: 23 MMOL/L (ref 23–29)
CREAT SERPL-MCNC: 1.3 MG/DL (ref 0.5–1.4)
DIFFERENTIAL METHOD: ABNORMAL
EOSINOPHIL # BLD AUTO: 0.3 K/UL (ref 0–0.5)
EOSINOPHIL NFR BLD: 3.6 % (ref 0–8)
ERYTHROCYTE [DISTWIDTH] IN BLOOD BY AUTOMATED COUNT: 14.8 % (ref 11.5–14.5)
EST. GFR  (AFRICAN AMERICAN): 53 ML/MIN/1.73 M^2
EST. GFR  (NON AFRICAN AMERICAN): 46 ML/MIN/1.73 M^2
GLUCOSE SERPL-MCNC: 249 MG/DL (ref 70–110)
HCT VFR BLD AUTO: 27.1 % (ref 37–48.5)
HGB BLD-MCNC: 8.1 G/DL (ref 12–16)
IMM GRANULOCYTES # BLD AUTO: 0.03 K/UL (ref 0–0.04)
IMM GRANULOCYTES NFR BLD AUTO: 0.4 % (ref 0–0.5)
LYMPHOCYTES # BLD AUTO: 1.7 K/UL (ref 1–4.8)
LYMPHOCYTES NFR BLD: 23.1 % (ref 18–48)
MAGNESIUM SERPL-MCNC: 1.9 MG/DL (ref 1.6–2.6)
MCH RBC QN AUTO: 30.3 PG (ref 27–31)
MCHC RBC AUTO-ENTMCNC: 29.9 G/DL (ref 32–36)
MCV RBC AUTO: 102 FL (ref 82–98)
MONOCYTES # BLD AUTO: 0.9 K/UL (ref 0.3–1)
MONOCYTES NFR BLD: 12 % (ref 4–15)
NEUTROPHILS # BLD AUTO: 4.4 K/UL (ref 1.8–7.7)
NEUTROPHILS NFR BLD: 59.7 % (ref 38–73)
NRBC BLD-RTO: 0 /100 WBC
PLATELET # BLD AUTO: 268 K/UL (ref 150–450)
PMV BLD AUTO: 11.5 FL (ref 9.2–12.9)
POCT GLUCOSE: 194 MG/DL (ref 70–110)
POCT GLUCOSE: 203 MG/DL (ref 70–110)
POCT GLUCOSE: 271 MG/DL (ref 70–110)
POCT GLUCOSE: 322 MG/DL (ref 70–110)
POTASSIUM SERPL-SCNC: 4.6 MMOL/L (ref 3.5–5.1)
RBC # BLD AUTO: 2.67 M/UL (ref 4–5.4)
SODIUM SERPL-SCNC: 136 MMOL/L (ref 136–145)
VANCOMYCIN SERPL-MCNC: 17.7 UG/ML
WBC # BLD AUTO: 7.4 K/UL (ref 3.9–12.7)

## 2022-01-22 PROCEDURE — 80048 BASIC METABOLIC PNL TOTAL CA: CPT | Mod: HCNC | Performed by: PHYSICIAN ASSISTANT

## 2022-01-22 PROCEDURE — 36415 COLL VENOUS BLD VENIPUNCTURE: CPT | Mod: HCNC | Performed by: PHYSICIAN ASSISTANT

## 2022-01-22 PROCEDURE — 27000207 HC ISOLATION: Mod: HCNC

## 2022-01-22 PROCEDURE — 80202 ASSAY OF VANCOMYCIN: CPT | Mod: HCNC | Performed by: INTERNAL MEDICINE

## 2022-01-22 PROCEDURE — 85025 COMPLETE CBC W/AUTO DIFF WBC: CPT | Mod: HCNC | Performed by: PHYSICIAN ASSISTANT

## 2022-01-22 PROCEDURE — 63600175 PHARM REV CODE 636 W HCPCS: Mod: HCNC | Performed by: INTERNAL MEDICINE

## 2022-01-22 PROCEDURE — 94761 N-INVAS EAR/PLS OXIMETRY MLT: CPT | Mod: HCNC

## 2022-01-22 PROCEDURE — 11000001 HC ACUTE MED/SURG PRIVATE ROOM: Mod: HCNC

## 2022-01-22 PROCEDURE — 25000003 PHARM REV CODE 250: Mod: HCNC | Performed by: PHYSICIAN ASSISTANT

## 2022-01-22 PROCEDURE — 83735 ASSAY OF MAGNESIUM: CPT | Mod: HCNC | Performed by: PHYSICIAN ASSISTANT

## 2022-01-22 PROCEDURE — 25000003 PHARM REV CODE 250: Mod: HCNC

## 2022-01-22 PROCEDURE — 36415 COLL VENOUS BLD VENIPUNCTURE: CPT | Mod: HCNC | Performed by: INTERNAL MEDICINE

## 2022-01-22 PROCEDURE — 97542 WHEELCHAIR MNGMENT TRAINING: CPT | Mod: HCNC,CQ

## 2022-01-22 PROCEDURE — 97530 THERAPEUTIC ACTIVITIES: CPT | Mod: HCNC,CQ

## 2022-01-22 PROCEDURE — 25000003 PHARM REV CODE 250: Mod: HCNC | Performed by: INTERNAL MEDICINE

## 2022-01-22 RX ADMIN — Medication 800 MG: at 08:01

## 2022-01-22 RX ADMIN — INSULIN ASPART 5 UNITS: 100 INJECTION, SOLUTION INTRAVENOUS; SUBCUTANEOUS at 11:01

## 2022-01-22 RX ADMIN — PREGABALIN 150 MG: 75 CAPSULE ORAL at 09:01

## 2022-01-22 RX ADMIN — PREGABALIN 150 MG: 75 CAPSULE ORAL at 08:01

## 2022-01-22 RX ADMIN — ATORVASTATIN CALCIUM 80 MG: 40 TABLET, FILM COATED ORAL at 08:01

## 2022-01-22 RX ADMIN — INSULIN ASPART 5 UNITS: 100 INJECTION, SOLUTION INTRAVENOUS; SUBCUTANEOUS at 09:01

## 2022-01-22 RX ADMIN — MEROPENEM 1 G: 1 INJECTION, POWDER, FOR SOLUTION INTRAVENOUS at 11:01

## 2022-01-22 RX ADMIN — Medication 800 MG: at 09:01

## 2022-01-22 RX ADMIN — MELATONIN TAB 3 MG 6 MG: 3 TAB at 08:01

## 2022-01-22 RX ADMIN — SODIUM BICARBONATE 650 MG: 650 TABLET ORAL at 08:01

## 2022-01-22 RX ADMIN — MEROPENEM 1 G: 1 INJECTION, POWDER, FOR SOLUTION INTRAVENOUS at 04:01

## 2022-01-22 RX ADMIN — SERTRALINE HYDROCHLORIDE 50 MG: 50 TABLET ORAL at 09:01

## 2022-01-22 RX ADMIN — ALLOPURINOL 100 MG: 100 TABLET ORAL at 09:01

## 2022-01-22 RX ADMIN — HYDROCODONE BITARTRATE AND ACETAMINOPHEN 1 TABLET: 5; 325 TABLET ORAL at 11:01

## 2022-01-22 RX ADMIN — APIXABAN 5 MG: 5 TABLET, FILM COATED ORAL at 09:01

## 2022-01-22 RX ADMIN — HYDROCODONE BITARTRATE AND ACETAMINOPHEN 1 TABLET: 5; 325 TABLET ORAL at 07:01

## 2022-01-22 RX ADMIN — APIXABAN 5 MG: 5 TABLET, FILM COATED ORAL at 08:01

## 2022-01-22 RX ADMIN — INSULIN ASPART 5 UNITS: 100 INJECTION, SOLUTION INTRAVENOUS; SUBCUTANEOUS at 04:01

## 2022-01-22 RX ADMIN — CLOPIDOGREL 75 MG: 75 TABLET, FILM COATED ORAL at 09:01

## 2022-01-22 RX ADMIN — INSULIN ASPART 2 UNITS: 100 INJECTION, SOLUTION INTRAVENOUS; SUBCUTANEOUS at 06:01

## 2022-01-22 RX ADMIN — MEROPENEM 1 G: 1 INJECTION, POWDER, FOR SOLUTION INTRAVENOUS at 09:01

## 2022-01-22 RX ADMIN — MIRTAZAPINE 15 MG: 7.5 TABLET ORAL at 08:01

## 2022-01-22 RX ADMIN — COLLAGENASE SANTYL: 250 OINTMENT TOPICAL at 09:01

## 2022-01-22 RX ADMIN — HYDROCODONE BITARTRATE AND ACETAMINOPHEN 1 TABLET: 5; 325 TABLET ORAL at 04:01

## 2022-01-22 RX ADMIN — SODIUM BICARBONATE 650 MG: 650 TABLET ORAL at 09:01

## 2022-01-22 RX ADMIN — PREGABALIN 150 MG: 75 CAPSULE ORAL at 04:01

## 2022-01-22 RX ADMIN — FUROSEMIDE 40 MG: 40 TABLET ORAL at 09:01

## 2022-01-22 NOTE — NURSING
Patient AAOx4. C/o pain to RLE. No c/o n/v. Medications administered per orders. R midline in place. RA. Turn q2hrs. Diabetic diet 2000 arya. BG, LABS and VS monitored. Instructed to call for assistance. Safety maintained. Will continue to monitor.

## 2022-01-22 NOTE — ASSESSMENT & PLAN NOTE
55 y/o female with PMHx CKD3, DMII, GERD, HLD, HTN, osteomyelitis, PVD, depression, and anxiety presents to ED with  with continue concern for wound infection.       -await final cxs - new GNR on culture - is Klebsiella ESBL  plus pseudomonas  -continue vanco for now  -change cefepime to meropenem to cover the Klebsiella ESBL and pseudomonas

## 2022-01-22 NOTE — PLAN OF CARE
Problem: Physical Therapy Goal  Goal: Physical Therapy Goal  Description: Goals to be met by: 22     Patient will increase functional independence with mobility by performin. Supine <> sit with Stand-by Assistance  2. Bed to chair transfer with Minimal Assistance using scoot transfer or slideboard/beasy board  3. Wheelchair propulsion x 50 feet with Stand-by Assistance using bilateral upper extremities MET 2022    Updated  3. Wheelchair propulsion x 150 feet with Stand-by Assistance using bilateral upper extremities    Outcome: Ongoing, Progressing   Pt continues to work toward all goals. Able to perform wheelchair propulsion by 2 trials ~60 ft and ~70 ft using BUE's requiring SBA. Able to transfer from EOB to W/C requiring max A x 1 using slide board and W/C to bed requiring max A x 2 people using slide board. Both transfers performed to pt's left side.

## 2022-01-22 NOTE — PROGRESS NOTES
"Surgery follow up  BP (!) 145/65 (Patient Position: Sitting)   Pulse 101   Temp 98.7 °F (37.1 °C) (Oral)   Resp 17   Ht 5' 5" (1.651 m)   Wt 76.2 kg (167 lb 15.9 oz)   LMP 09/30/2015 (Exact Date)   SpO2 100%   BMI 27.96 kg/m²   Recent Results (from the past 336 hour(s))   CBC Auto Differential    Collection Time: 01/22/22  3:35 AM   Result Value Ref Range    WBC 7.40 3.90 - 12.70 K/uL    Hemoglobin 8.1 (L) 12.0 - 16.0 g/dL    Hematocrit 27.1 (L) 37.0 - 48.5 %    Platelets 268 150 - 450 K/uL   CBC Auto Differential    Collection Time: 01/21/22  4:42 AM   Result Value Ref Range    WBC 7.53 3.90 - 12.70 K/uL    Hemoglobin 7.8 (L) 12.0 - 16.0 g/dL    Hematocrit 25.2 (L) 37.0 - 48.5 %    Platelets 318 150 - 450 K/uL   CBC Auto Differential    Collection Time: 01/20/22  5:08 AM   Result Value Ref Range    WBC 7.52 3.90 - 12.70 K/uL    Hemoglobin 8.5 (L) 12.0 - 16.0 g/dL    Hematocrit 27.3 (L) 37.0 - 48.5 %    Platelets 333 150 - 450 K/uL     Recent Results (from the past 336 hour(s))   Basic Metabolic Panel    Collection Time: 01/22/22  3:35 AM   Result Value Ref Range    Sodium 136 136 - 145 mmol/L    Potassium 4.6 3.5 - 5.1 mmol/L    Chloride 105 95 - 110 mmol/L    CO2 23 23 - 29 mmol/L    BUN 46 (H) 6 - 20 mg/dL    Creatinine 1.3 0.5 - 1.4 mg/dL    Calcium 8.2 (L) 8.7 - 10.5 mg/dL    Anion Gap 8 8 - 16 mmol/L   Basic Metabolic Panel    Collection Time: 01/21/22  4:42 AM   Result Value Ref Range    Sodium 135 (L) 136 - 145 mmol/L    Potassium 3.5 3.5 - 5.1 mmol/L    Chloride 105 95 - 110 mmol/L    CO2 20 (L) 23 - 29 mmol/L    BUN 34 (H) 6 - 20 mg/dL    Creatinine 1.1 0.5 - 1.4 mg/dL    Calcium 8.6 (L) 8.7 - 10.5 mg/dL    Anion Gap 10 8 - 16 mmol/L   Basic Metabolic Panel    Collection Time: 01/20/22  5:08 AM   Result Value Ref Range    Sodium 138 136 - 145 mmol/L    Potassium 3.8 3.5 - 5.1 mmol/L    Chloride 106 95 - 110 mmol/L    CO2 23 23 - 29 mmol/L    BUN 30 (H) 6 - 20 mg/dL    Creatinine 1.0 0.5 - 1.4 mg/dL "    Calcium 8.4 (L) 8.7 - 10.5 mg/dL    Anion Gap 9 8 - 16 mmol/L   dressing changed , wound open medial end , needs debridement and revision , will do on Monday.

## 2022-01-22 NOTE — PROGRESS NOTES
[facility] Surg  Infectious Disease  Progress Note    Patient Name: Suyapa Connelly  MRN: [id number]  Admission Date: 1/17/2022  Length of Stay: 1 days  Attending Physician: Obinna Marr DO  Primary Care Provider: Magen Christensen MD    Isolation Status: Contact  Assessment/Plan:      * Infected wound  57 y/o female with PMHx CKD3, DMII, GERD, HLD, HTN, osteomyelitis, PVD, depression, and anxiety presents to ED with  with continue concern for wound infection.       -await final cxs - new GNR on culture - is Klebsiella ESBL  plus pseudomonas  -continue vanco for now  -change cefepime to meropenem to cover the Klebsiella ESBL and pseudomonas          Anticipated Disposition: unclear at this point    Thank you for your consult. I will follow-up with patient. Please contact us if you have any additional questions.[phone]    Geno Sethi MD  Infectious Disease  [facility] Surg    Subjective:     Principal Problem:Infected wound    HPI: 57 y/o female with PMHx CKD3, DMII, GERD, HLD, HTN, osteomyelitis, PVD, depression, and anxiety presents to ED with  with continue concern for wound infection.  Patient underwent right BKA by Dr. Rojas on 12/28 then discharged to rehab facility on 1/10.  Rehab facility staff became concerned for possible wound infection and patient was sent back to ED on 1/11.  Lab work was obtained, and images of wound were reviewed with Dr. Rojas, who recommended continued outpatient management.  At discharge patient was prescribed Augmentin, which she has continued taking as instructed.   at bedside is concern for progressively worsening appearance of wound, along with continued drainage.  Patient reporting worsened pain today described as sharp, worse with touch, radiating towards right knee, severity 8/10.  No fevers, chills, nausea, vomiting, chest pain, cough, shortness of breath, abdominal pain, urinary or bowel complications      ID is consulted 1/19/22 for  "infection of BKA site with wound cxs growing presumptive pseudomonas  1/20/22 - new gram negative favio on culture in addition to the pseudomonas; continue vanco and cefepime  1/21/22 - the right knee culture from 1/17 positive for pseudomonas and Klebsiella ESBL - need to change cefepime to meropenem and stop vancomycin    Interval History: patient with no complaints today    Review of Systems   Respiratory: Negative for shortness of breath.    Gastrointestinal: Negative for diarrhea, nausea and vomiting.     Antibiotics (From admission, onward)            Start     Stop Route Frequency Ordered    01/19/22 2100  cefepime in dextrose 5 % 1 gram/50 mL IVPB 1 g         -- IV Every 8 hours (non-standard times) 01/19/22 1322    01/17/22 2021  vancomycin - pharmacy to dose  (vancomycin IVPB)        "And" Linked Group Details    -- IV pharmacy to manage frequency 01/17/22 1922          Objective:     Vital Signs (Most Recent):  Temp: 97.1 °F (36.2 °C) (01/21/22 1951)  Pulse: 101 (01/21/22 1951)  Resp: 20 (01/21/22 2011)  BP: 135/63 (01/21/22 1951)  SpO2: 99 % (01/21/22 1951) Vital Signs (24h Range):  Temp:  [96.2 °F (35.7 °C)-98.4 °F (36.9 °C)] 97.1 °F (36.2 °C)  Pulse:  [] 101  Resp:  [14-20] 20  SpO2:  [97 %-100 %] 99 %  BP: (124-157)/(59-72) 135/63     Weight: 76.2 kg (167 lb 15.9 oz)  Body mass index is 27.96 kg/m².    Estimated Creatinine Clearance: 59 mL/min (based on SCr of 1.1 mg/dL).    Physical Exam  Cardiovascular:      Heart sounds: Normal heart sounds.   Pulmonary:      Breath sounds: Normal breath sounds.   Abdominal:      General: Bowel sounds are normal. There is no distension.      Palpations: Abdomen is soft.   Musculoskeletal:      Right lower leg: No edema.      Left lower leg: No edema.      Comments: Right bka bandaged. Left AKA healed well         Significant Labs:   Blood Culture:   Recent Labs   Lab 12/30/21  1412 01/02/22  1938 01/17/22  1548 01/19/22  0848   [place] No growth after 5 days.  " No growth after 5 days. No growth after 5 days.  No growth after 5 days. No Growth to date  No Growth to date  No Growth to date  No Growth to date  No Growth to date  Gram stain salvatore bottle: Gram positive cocci in clusters resembling Staph   Results called to and read back by: Tita Tavarez RN 01/18/2022    19:36  Gram stain aer bottle: Gram positive cocci in clusters resembling Staph   Positive results previously called 01/19/2022  04:26  COAGULASE-NEGATIVE STAPHYLOCOCCUS SPECIES  Organism is a probable contaminant  * No Growth to date  No Growth to date  No Growth to date  No Growth to date  No Growth to date  No Growth to date     CBC:   Recent Labs   Lab 01/20/22  0508 01/21/22  0442   WBC 7.52 7.53   HGB 8.5* 7.8*   HCT 27.3* 25.2*    318     CMP:   Recent Labs   Lab 01/20/22  0508 01/21/22  0442    135*   K 3.8 3.5    105   CO2 23 20*   * 201*   BUN 30* 34*   CREATININE 1.0 1.1   CALCIUM 8.4* 8.6*   ANIONGAP 9 10   EGFRNONAA >60 57*       Urine Culture:   Recent Labs   Lab 12/21/21  2204 01/03/22  1724   LABURIN No growth CANDIDA GLABRATA  > 100,000 cfu/ml  Treatment of asymptomatic candiduria is not recommended (except for   specific populations). Candida isolated in the urine typically   represents colonization. If an indwelling urinary catheter is present  it should be removed or replaced.  *     Urine Studies:   Recent Labs   Lab 01/03/22  1724   COLORU Yellow   APPEARANCEUA Cloudy*   PHUR 6.0   SPECGRAV 1.020   PROTEINUA 3+*   GLUCUA Trace*   KETONESU Negative   BILIRUBINUA Negative   OCCULTUA 2+*   NITRITE Negative   UROBILINOGEN Negative   LEUKOCYTESUR 3+*   RBCUA 12*   WBCUA >100*   BACTERIA Moderate*   SQUAMEPITHEL 14   HYALINECASTS 0     Wound Culture:   Recent Labs   Lab 10/14/21  1108 01/17/22  1627 01/20/22  1451   LABAERO KLEBSIELLA PNEUMONIAE ESBL  Moderate  * PSEUDOMONAS AERUGINOSA  Many  *  KLEBSIELLA PNEUMONIAE ESBL  Moderate  * No growth        Significant Imaging: no new images

## 2022-01-22 NOTE — SUBJECTIVE & OBJECTIVE
"Interval History: patient with no complaints today    Review of Systems   Respiratory: Negative for shortness of breath.    Gastrointestinal: Negative for diarrhea, nausea and vomiting.     Antibiotics (From admission, onward)            Start     Stop Route Frequency Ordered    01/19/22 2100  cefepime in dextrose 5 % 1 gram/50 mL IVPB 1 g         -- IV Every 8 hours (non-standard times) 01/19/22 1322    01/17/22 2021  vancomycin - pharmacy to dose  (vancomycin IVPB)        "And" Linked Group Details    -- IV pharmacy to manage frequency 01/17/22 1922          Objective:     Vital Signs (Most Recent):  Temp: 97.1 °F (36.2 °C) (01/21/22 1951)  Pulse: 101 (01/21/22 1951)  Resp: 20 (01/21/22 2011)  BP: 135/63 (01/21/22 1951)  SpO2: 99 % (01/21/22 1951) Vital Signs (24h Range):  Temp:  [96.2 °F (35.7 °C)-98.4 °F (36.9 °C)] 97.1 °F (36.2 °C)  Pulse:  [] 101  Resp:  [14-20] 20  SpO2:  [97 %-100 %] 99 %  BP: (124-157)/(59-72) 135/63     Weight: 76.2 kg (167 lb 15.9 oz)  Body mass index is 27.96 kg/m².    Estimated Creatinine Clearance: 59 mL/min (based on SCr of 1.1 mg/dL).    Physical Exam  Cardiovascular:      Heart sounds: Normal heart sounds.   Pulmonary:      Breath sounds: Normal breath sounds.   Abdominal:      General: Bowel sounds are normal. There is no distension.      Palpations: Abdomen is soft.   Musculoskeletal:      Right lower leg: No edema.      Left lower leg: No edema.      Comments: Right bka bandaged. Left AKA healed well         Significant Labs:   Blood Culture:   Recent Labs   Lab 12/30/21  1412 01/02/22  1938 01/17/22  1548 01/19/22  0848   LABBLOO No growth after 5 days.  No growth after 5 days. No growth after 5 days.  No growth after 5 days. No Growth to date  No Growth to date  No Growth to date  No Growth to date  No Growth to date  Gram stain salvatore bottle: Gram positive cocci in clusters resembling Staph   Results called to and read back by: Tita Tavarez RN 01/18/2022    " 19:36  Gram stain aer bottle: Gram positive cocci in clusters resembling Staph   Positive results previously called 01/19/2022  04:26  COAGULASE-NEGATIVE STAPHYLOCOCCUS SPECIES  Organism is a probable contaminant  * No Growth to date  No Growth to date  No Growth to date  No Growth to date  No Growth to date  No Growth to date     CBC:   Recent Labs   Lab 01/20/22  0508 01/21/22  0442   WBC 7.52 7.53   HGB 8.5* 7.8*   HCT 27.3* 25.2*    318     CMP:   Recent Labs   Lab 01/20/22  0508 01/21/22  0442    135*   K 3.8 3.5    105   CO2 23 20*   * 201*   BUN 30* 34*   CREATININE 1.0 1.1   CALCIUM 8.4* 8.6*   ANIONGAP 9 10   EGFRNONAA >60 57*       Urine Culture:   Recent Labs   Lab 12/21/21  2204 01/03/22  1724   LABURIN No growth CANDIDA GLABRATA  > 100,000 cfu/ml  Treatment of asymptomatic candiduria is not recommended (except for   specific populations). Candida isolated in the urine typically   represents colonization. If an indwelling urinary catheter is present  it should be removed or replaced.  *     Urine Studies:   Recent Labs   Lab 01/03/22  1724   COLORU Yellow   APPEARANCEUA Cloudy*   PHUR 6.0   SPECGRAV 1.020   PROTEINUA 3+*   GLUCUA Trace*   KETONESU Negative   BILIRUBINUA Negative   OCCULTUA 2+*   NITRITE Negative   UROBILINOGEN Negative   LEUKOCYTESUR 3+*   RBCUA 12*   WBCUA >100*   BACTERIA Moderate*   SQUAMEPITHEL 14   HYALINECASTS 0     Wound Culture:   Recent Labs   Lab 10/14/21  1108 01/17/22  1627 01/20/22  1451   LABAERO KLEBSIELLA PNEUMONIAE ESBL  Moderate  * PSEUDOMONAS AERUGINOSA  Many  *  KLEBSIELLA PNEUMONIAE ESBL  Moderate  * No growth       Significant Imaging: no new images

## 2022-01-22 NOTE — PROGRESS NOTES
[facility] Medicine  Progress Note    Patient Name: Suyapa Connelly  MRN: [id number]  Patient Class: IP- Inpatient   Admission Date: 1/17/2022  Length of Stay: 2 days  Attending Physician: Jose Carlos Mar MD  Primary Care Provider: Magen Christensen MD        Subjective:     Principal Problem:Infected wound       HPI:  Suyapa Connelly is a 55 y/o female with PMHx CKD3, DMII, GERD, HLD, HTN, osteomyelitis, PVD, depression, and anxiety presents to ED with  with continue concern for wound infection.  Patient underwent right BKA by Dr. Rojas on 12/28 then discharged to rehab facility on 1/10.  Rehab facility staff became concerned for possible wound infection and patient was sent back to ED on 1/11.  Lab work was obtained, and images of wound were reviewed with Dr. Rojas, who recommended continued outpatient management.  At discharge patient was prescribed Augmentin, which she has continued taking as instructed.   at bedside is concern for progressively worsening appearance of wound, along with continued drainage.  Patient reporting worsened pain today described as sharp, worse with touch, radiating towards right knee, severity 8/10.  No fevers, chills, nausea, vomiting, chest pain, cough, shortness of breath, abdominal pain, urinary or bowel complications.  She does admit to not taking her BP medication today.  No other acute complaints at this time. Will admit to hospital medicine for observation services. General surgery consulted.         Overview/Hospital Course:  Pt placed in observation for evaluation of wound infection.  Pt s/p R BKA with Dr. Rojas on 12/28/21.  Vanc and cefazolin initiated.  Wound culture presumptive pseudomonas species cefazolin discontinued and changed to cefepime.  UC candida and BC 1 of 2 bottles coag-neg staph suspected contaminant.  Gen Surg consulted and recommended santyl, xeroform, 4 x 4, kerlix and ace bandage daily.  Wound culture pseudomonas  sensitive to cefepime. GNR with I&S pending.     PT/OT recommended rehab facility.  WC consulted and unstageable sacral decub.  Recommended waffle overlay and triad ointment to L buttocks BID and prn incontinent episode.      Interval History: no acute event overnight. Appreciate surgery and ID consult. Cefepime switched to meropenem yday.    Review of Systems     Constitutional: Negative for chills, diaphoresis, fatigue and fever.   HENT: Negative for hearing loss and sore throat.    Eyes: Negative for visual disturbance.   Respiratory: Negative for cough and shortness of breath.    Cardiovascular: Negative for chest pain and leg swelling.   Gastrointestinal: Negative for abdominal pain, diarrhea, nausea and vomiting.   Genitourinary: Negative for difficulty urinating and flank pain.   Musculoskeletal: Negative for back pain.   Skin: Positive for wound. Negative for rash.   Neurological: Negative for dizziness, weakness and headaches.   Psychiatric/Behavioral: Negative for agitation and confusion.       Objective:     Vital Signs (Most Recent):  Temp: 97.7 °F (36.5 °C) (01/22/22 0742)  Pulse: 97 (01/22/22 0742)  Resp: 18 (01/22/22 0742)  BP: (!) 148/65 (01/22/22 0742)  SpO2: 98 % (01/22/22 0453) Vital Signs (24h Range):  Temp:  [96.4 °F (35.8 °C)-98.3 °F (36.8 °C)] 97.7 °F (36.5 °C)  Pulse:  [] 97  Resp:  [14-20] 18  SpO2:  [98 %-100 %] 98 %  BP: (135-157)/(63-72) 148/65     Weight: 76.2 kg (167 lb 15.9 oz)  Body mass index is 27.96 kg/m².    Intake/Output Summary (Last 24 hours) at 1/22/2022 0906  Last data filed at 1/22/2022 0407  Gross per 24 hour   Intake 433.36 ml   Output --   Net 433.36 ml      Physical Exam    Vitals and nursing note reviewed.   Constitutional:       General: She is not in acute distress.     Appearance: She is obese. She is not ill-appearing.   HENT:      Head: Normocephalic and atraumatic.      Mouth/Throat:      Mouth: Mucous membranes are moist.   Eyes:      Extraocular Movements:  Extraocular movements intact.      Pupils: Pupils are equal, round, and reactive to light.   Cardiovascular:      Rate and Rhythm: Normal rate and regular rhythm.      Pulses: Normal pulses.      Heart sounds: Normal heart sounds.   Pulmonary:      Effort: Pulmonary effort is normal.   Abdominal:      General: Bowel sounds are normal.      Palpations: Abdomen is soft.   Musculoskeletal:         General: No swelling.      Cervical back: Normal range of motion and neck supple.      Right Lower Extremity: Right leg is amputated below knee.      Left Lower Extremity: Left leg is amputated below knee.   Feet:      Right foot:      Skin integrity: No erythema or warmth.      Comments: S/p right BKA 12/28.  Staples intact. Wound dehisced, primary to lateral surface.  Eschar noted primarily to lateral surface. Mild surrounding erythema.    Left BKA at baseline, uncomplicated.  No open wounds.  Skin:     General: Skin is warm and dry.      Capillary Refill: Capillary refill takes less than 2 seconds.      Comments: unstageable sacral decubitus   Neurological:      General: No focal deficit present.      Mental Status: She is alert and oriented to person, place, and time.   Psychiatric:         Mood and Affect: Mood normal.         Behavior: Behavior normal. Behavior is cooperative.          Significant Labs:   Lab Results   Component Value Date    WBC 7.40 01/22/2022    HGB 8.1 (L) 01/22/2022    HCT 27.1 (L) 01/22/2022     (H) 01/22/2022     01/22/2022       CMP  Sodium   Date Value Ref Range Status   01/22/2022 136 136 - 145 mmol/L Final     Potassium   Date Value Ref Range Status   01/22/2022 4.6 3.5 - 5.1 mmol/L Final     Chloride   Date Value Ref Range Status   01/22/2022 105 95 - 110 mmol/L Final     CO2   Date Value Ref Range Status   01/22/2022 23 23 - 29 mmol/L Final     Glucose   Date Value Ref Range Status   01/22/2022 249 (H) 70 - 110 mg/dL Final     BUN   Date Value Ref Range Status   01/22/2022 46  (H) 6 - 20 mg/dL Final     Creatinine   Date Value Ref Range Status   01/22/2022 1.3 0.5 - 1.4 mg/dL Final     Calcium   Date Value Ref Range Status   01/22/2022 8.2 (L) 8.7 - 10.5 mg/dL Final     Total Protein   Date Value Ref Range Status   01/18/2022 6.0 6.0 - 8.4 g/dL Final     Albumin   Date Value Ref Range Status   01/18/2022 1.6 (L) 3.5 - 5.2 g/dL Final     Total Bilirubin   Date Value Ref Range Status   01/18/2022 0.2 0.1 - 1.0 mg/dL Final     Comment:     For infants and newborns, interpretation of results should be based  on gestational age, weight and in agreement with clinical  observations.    Premature Infant recommended reference ranges:  Up to 24 hours.............<8.0 mg/dL  Up to 48 hours............<12.0 mg/dL  3-5 days..................<15.0 mg/dL  6-29 days.................<15.0 mg/dL       Alkaline Phosphatase   Date Value Ref Range Status   01/18/2022 100 55 - 135 U/L Final     AST   Date Value Ref Range Status   01/18/2022 14 10 - 40 U/L Final     ALT   Date Value Ref Range Status   01/18/2022 13 10 - 44 U/L Final     Anion Gap   Date Value Ref Range Status   01/22/2022 8 8 - 16 mmol/L Final     eGFR if    Date Value Ref Range Status   01/22/2022 53 (A) >60 mL/min/1.73 m^2 Final     eGFR if non    Date Value Ref Range Status   01/22/2022 46 (A) >60 mL/min/1.73 m^2 Final     Comment:     Calculation used to obtain the estimated glomerular filtration  rate (eGFR) is the CKD-EPI equation.            Significant Imaging:     Assessment/Plan:      Active Diagnoses:    Diagnosis Date Noted POA    PRINCIPAL PROBLEM:  Infected wound [T14.8XXA, L08.9] 01/17/2022 Yes    Decubitus ulcer of sacral region, unstageable [L89.150] 01/18/2022 Unknown    S/P BKA (below knee amputation) unilateral, right [Z89.511] 12/30/2021 Not Applicable    Phantom limb syndrome with pain [G54.6] 07/12/2021 Yes    Chronic anemia [D64.9] 03/23/2021 Yes    Type 2 diabetes mellitus with  diabetic nephropathy, with long-term current use of insulin [E11.21, Z79.4] 02/24/2021 Not Applicable    Paroxysmal atrial fibrillation [I48.0] 01/28/2020 Yes     Chronic    Mixed hyperlipidemia [E78.2] 12/13/2019 Yes     Chronic    MDD (major depressive disorder), recurrent episode, moderate [F33.1] 05/07/2018 Yes     Chronic    Peripheral vascular disease [I73.9] 10/06/2015 Yes      Problems Resolved During this Admission:     VTE Risk Mitigation (From admission, onward)         Ordered     apixaban tablet 5 mg  2 times daily         01/17/22 1920     IP VTE HIGH RISK PATIENT  Once         01/17/22 1658     Place sequential compression device  Until discontinued         01/17/22 1658               * Infected wound  S/p right bka with Dr. Rojas on 12/28/21  Presents to ED with concerns for wound infection  BKA site with dehisced wound  Received zosyn x1  Vancomycin and cefazolin initiated, cultures pseudomonas.  Cefazolin changed to cefepime and Vanc.  General surgery consulted for possible debridement: check cultures , apply santyl , xeroform , 4 x 4 , kerlix , and ace bandage daily, iv antibiotics  ID consulted for recommendations on antibiotic therapy: vanc, meropenem  Await final cultures.  PT/OT: rehab        Decubitus ulcer of sacral region, unstageable  Wound Care assistance appreciated  Recommended waffle overlay and triad ointment to L buttocks BID and prn incontinent episode.     S/P BKA (below knee amputation) unilateral, right  See above        Phantom limb syndrome with pain  Resume home lyrica  Norco q4hr prn      Type 2 diabetes mellitus with diabetic nephropathy, with long-term current use of insulin          Hemoglobin A1C   Date Value Ref Range Status   12/03/2021 9.6 (H) 4.0 - 5.6 % Final       Comment:       ADA Screening Guidelines:  5.7-6.4%  Consistent with prediabetes  >or=6.5%  Consistent with diabetes     High levels of fetal hemoglobin interfere with the HbA1C  assay. Heterozygous  hemoglobin variants (HbS, HgC, etc)do  not significantly interfere with this assay.   However, presence of multiple variants may affect accuracy.                                                             Patient's FSGs are uncontrolled due to hyperglycemia on current medication regimen.  - home regimen includes detemir, aspart  - hold home oral medications  - increased scheduled doses: detemir 10U qhs, aspart 2U tid plus scale will increase to 5U tid. Controlled.  - LDSSI with ACCUcheck ACHS  - Diabetic diet  -Hypoglycemia protocol PRN           Paroxysmal atrial fibrillation  Patient with paroxymal afib  Resumed home OAC, rate controlled  Monitor on telemetry              Mixed hyperlipidemia  Last LDL was         Lab Results   Component Value Date     LDLCALC 177.6 (H) 12/03/2021      Patient is chronically on statin.will continue for now.                  MDD (major depressive disorder), recurrent episode, moderate  Resumed zoloft, stable        Peripheral vascular disease  Resume statin         Chronic anemia  Stable and at baseline            Jose Carlos Mar MD  Department of Hospital Medicine   [facility] Surg

## 2022-01-22 NOTE — PT/OT/SLP PROGRESS
Physical Therapy Treatment    Patient Name:  Suyapa Connelly   MRN:  [id number]    Recommendations:     Discharge Recommendations:  rehabilitation facility   Discharge Equipment Recommendations:  (TBD)   Barriers to discharge: Increased burden of care    Assessment:     Suyapa Connelly is a 56 y.o. female admitted with a medical diagnosis of Infected wound.  She presents with the following impairments/functional limitations:  weakness,impaired endurance,impaired self care skills,impaired functional mobilty,impaired balance,decreased coordination,decreased upper extremity function,decreased lower extremity function,decreased safety awareness,pain,decreased ROM,impaired coordination,impaired skin,edema. Pt able to perform W/C propulsion using bilateral UE's requiring SBA 2 trials ~60 ft and ~75 ft. Performed transfer from EOB to W/C using slide board requiring max A by 1 and W/C back to bed requiring max A by 2 people. Would benefit from continued PT services to increase pt's out of bed therapeutic activity and exercises.    Rehab Prognosis: Good; patient would benefit from acute skilled PT services to address these deficits and reach maximum level of function.    Recent Surgery: * No surgery found *      Plan:     During this hospitalization, patient to be seen 5 x/week to address the identified rehab impairments via therapeutic activities,therapeutic exercises,neuromuscular re-education,wheelchair management/training and progress toward the following goals:    · Plan of Care Expires:  02/18/22    Subjective     Chief Complaint: None Expressed  Patient/Family Comments/goals: None Expressed  Pain/Comfort:  · Pain Rating 1:  (Did not rate)  · Location - Side 1: Left  · Location - Orientation 1: lower  · Location 1: leg  · Pain Addressed 1: Distraction,Cessation of Activity,Reposition  · Pain Rating Post-Intervention 1:  (Did not rate)      Objective:     Communicated with nurse prior to session.  Patient found supine  with bed alarm,PICC line upon PT entry to room.     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       AM-PAC 6 CLICK MOBILITY  Turning over in bed (including adjusting bedclothes, sheets and blankets)?: 3  Sitting down on and standing up from a chair with arms (e.g., wheelchair, bedside commode, etc.): 1  Moving from lying on back to sitting on the side of the bed?: 3  Moving to and from a bed to a chair (including a wheelchair)?: 2  Need to walk in hospital room?: 1  Climbing 3-5 steps with a railing?: 1  Basic Mobility Total Score: 11       Therapeutic Activities and Exercises:   Pt performed scooting along EOB using BUE's and requiring SBA. Transferred from EOB to W/C using wooden slide board (pt requested to use this instead of beasy board) going to the left side requiring max A of 1. Transferred from W/C back to EOB using wooden slide board and going to the left again requiring max A of 2 people as pt stated she felt nauseated and dizzy. Nurse called to room with pt's BP being taken and registering normal. Once pt back in bed nausea and dizziness subsided. While in W/C pt able to perform W/C propulsion ~60 ft and ~75 ft using BUE's requiring SBA. Pt pushed by PTA when resting and sat in front of window by elevators ~5-8 minutes. Also, performed small triceps pushups with PTA positioned sitting in front of  pt for safety. Pt only able to slightly lift buttocks off of chair seat.    Patient left supine with all lines intact, call button in reach, bed alarm on,   nurse notified and  nurse present..    GOALS:   Multidisciplinary Problems     Physical Therapy Goals        Problem: Physical Therapy Goal    Goal Priority Disciplines Outcome Goal Variances Interventions   Physical Therapy Goal     PT, PT/OT Ongoing, Progressing     Description: Goals to be met by: 22     Patient will increase functional independence with mobility by performin. Supine <> sit with Stand-by Assistance  2. Bed to chair transfer with Minimal Assistance using scoot transfer or slideboard/beasy board  3. Wheelchair propulsion x 50 feet with Stand-by Assistance using bilateral upper extremities MET 2022    Updated  3. Wheelchair propulsion x 150 feet with Stand-by Assistance using bilateral upper extremities                     Time Tracking:     PT Received On: 22  PT Start Time: 1015     PT Stop Time: 1120  PT Total Time (min): 65 min     Billable Minutes: Therapeutic Activity   30 and Train/Wheelchair Management  35    Treatment Type: Treatment  PT/PTA: PTA     PTA Visit Number: 1     2022

## 2022-01-22 NOTE — PLAN OF CARE
Pt. AAOx4. No c/o N/V during shift. C/O R stump phantom pain. PRN norco administered. Contact precautions maintained. Wound care completed per orders. BS monitored. Call light in reach, Pt. Instructed to call for assistance.   Problem: Adult Inpatient Plan of Care  Goal: Plan of Care Review  Outcome: Ongoing, Progressing  Goal: Patient-Specific Goal (Individualized)  Outcome: Ongoing, Progressing  Goal: Absence of Hospital-Acquired Illness or Injury  Outcome: Ongoing, Progressing  Goal: Optimal Comfort and Wellbeing  Outcome: Ongoing, Progressing  Goal: Readiness for Transition of Care  Outcome: Ongoing, Progressing     Problem: Diabetes Comorbidity  Goal: Blood Glucose Level Within Targeted Range  Outcome: Ongoing, Progressing     Problem: Fluid and Electrolyte Imbalance (Acute Kidney Injury/Impairment)  Goal: Fluid and Electrolyte Balance  Outcome: Ongoing, Progressing     Problem: Oral Intake Inadequate (Acute Kidney Injury/Impairment)  Goal: Optimal Nutrition Intake  Outcome: Ongoing, Progressing     Problem: Renal Function Impairment (Acute Kidney Injury/Impairment)  Goal: Effective Renal Function  Outcome: Ongoing, Progressing     Problem: Infection  Goal: Absence of Infection Signs and Symptoms  Outcome: Ongoing, Progressing     Problem: Impaired Wound Healing  Goal: Optimal Wound Healing  Outcome: Ongoing, Progressing     Problem: Fall Injury Risk  Goal: Absence of Fall and Fall-Related Injury  Outcome: Ongoing, Progressing     Problem: Skin Injury Risk Increased  Goal: Skin Health and Integrity  Outcome: Ongoing, Progressing

## 2022-01-22 NOTE — PROGRESS NOTES
Pharmacokinetic Assessment Follow Up: IV Vancomycin    Vancomycin serum concentration assessment(s):    The random level was drawn correctly and can be used to guide therapy at this time. The measurement is above the desired definitive target range of 10 to 15 mcg/mL.    Vancomycin Regimen Plan:    Re-dose when the random level is less than 15 mcg/mL, next level to be drawn at 0400 on 2-23-22    Drug levels (last 3 results):  Recent Labs   Lab Result Units 01/19/22  1928 01/20/22  0508 01/21/22  0442 01/22/22  1140   Vancomycin, Random ug/mL  --  23.5 18.1 17.7   Vancomycin-Trough ug/mL 29.6*  --   --   --        Pharmacy will continue to follow and monitor vancomycin.    Please contact pharmacy at extension [phone] for questions regarding this assessment.    Thank you for the consult,   Teddy Wilkinson       Patient brief summary:  Suyapa Connelly is a 56 y.o. female initiated on antimicrobial therapy with IV Vancomycin for treatment of skin & soft tissue infection    The patient's current regimen is Vancomycin pulse dosing    Drug Allergies:   Review of patient's allergies indicates:   Allergen Reactions    Ciprofloxacin Itching    Contrast media      Kidney injury    Iodine      Kidney injury       Actual Body Weight:   76.2 kg    Renal Function:   Estimated Creatinine Clearance: 49.9 mL/min (based on SCr of 1.3 mg/dL).,     Dialysis Method (if applicable):  N/A    CBC (last 72 hours):  Recent Labs   Lab Result Units 01/20/22  0508 01/21/22  0442 01/22/22  0335   WBC K/uL 7.52 7.53 7.40   Hemoglobin g/dL 8.5* 7.8* 8.1*   Hematocrit % 27.3* 25.2* 27.1*   Platelets K/uL 333 318 268   Gran % % 64.2 60.6 59.7   Lymph % % 21.1 23.1 23.1   Mono % % 10.1 11.0 12.0   Eosinophil % % 3.6 4.1 3.6   Basophil % % 0.7 0.8 1.2   Differential Method  Automated Automated Automated       Metabolic Panel (last 72 hours):  Recent Labs   Lab Result Units 01/20/22  0508 01/21/22  0442 01/22/22  0335   Sodium mmol/L 138 135* 136    Potassium mmol/L 3.8 3.5 4.6   Chloride mmol/L 106 105 105   CO2 mmol/L 23 20* 23   Glucose mg/dL 185* 201* 249*   BUN mg/dL 30* 34* 46*   Creatinine mg/dL 1.0 1.1 1.3   Magnesium mg/dL  --  1.7 1.9       Vancomycin Administrations:  vancomycin given in the last 96 hours                     vancomycin 500 mg in dextrose 5 % 100 mL IVPB (ready to mix system) (mg) 500 mg New Bag 01/21/22 1215    vancomycin 1.5 g in dextrose 5 % 250 mL IVPB (ready to mix) (mg) 1,500 mg New Bag 01/18/22 2056                    Microbiologic Results:  Microbiology Results (last 7 days)       Procedure Component Value Units Date/Time    Aerobic culture [[id number]]  (Abnormal) Collected: 01/20/22 1451    Order Status: Completed Specimen: Wound from Leg, Right Updated: 01/22/22 1042     Aerobic Bacterial Culture PRESUMPTIVE PSEUDOMONAS SPECIES  Few  Identification and susceptibility pending        GRAM NEGATIVE YSABEL  Few  Identification and susceptibility pending      Blood culture #1 **CANNOT BE ORDERED STAT** [[id number]] Collected: 01/17/22 1548    Order Status: Completed Specimen: Blood from Peripheral, Hand, Right Updated: 01/21/22 2012     Blood Culture, Routine No Growth to date      No Growth to date      No Growth to date      No Growth to date      No Growth to date    Blood culture [[id number]] Collected: 01/19/22 0848    Order Status: Completed Specimen: Blood from Antecubital, Right Arm Updated: 01/21/22 1612     Blood Culture, Routine No Growth to date      No Growth to date      No Growth to date    Narrative:      BC x 2, 30 min apart    Blood culture [[id number]] Collected: 01/19/22 0848    Order Status: Completed Specimen: Blood from Antecubital, Left Arm Updated: 01/21/22 1612     Blood Culture, Routine No Growth to date      No Growth to date      No Growth to date    Aerobic culture (Specify Source) **CANNOT BE ORDERED AS STAT** [[id number]]  (Abnormal)  (Susceptibility) Collected: 01/17/22 1627    Order Status: Completed  Specimen: Wound from Knee, Right Updated: 01/21/22 1049     Aerobic Bacterial Culture PSEUDOMONAS AERUGINOSA  Many        KLEBSIELLA PNEUMONIAE ESBL  Moderate      Culture, Anaerobe [[id number]] Collected: 01/20/22 1452    Order Status: Sent Specimen: Wound from Leg, Right Updated: 01/20/22 1932    Blood culture #2 **CANNOT BE ORDERED STAT** [[id number]]  (Abnormal) Collected: 01/17/22 1548    Order Status: Completed Specimen: Blood from Peripheral, Hand, Right Updated: 01/20/22 1201     Blood Culture, Routine Gram stain salvatore bottle: Gram positive cocci in clusters resembling Staph       Results called to and read back by: Tita Tavarez RN 01/18/2022        19:36      Gram stain aer bottle: Gram positive cocci in clusters resembling Staph       Positive results previously called 01/19/2022  04:26      COAGULASE-NEGATIVE STAPHYLOCOCCUS SPECIES  Organism is a probable contaminant

## 2022-01-23 ENCOUNTER — ANESTHESIA EVENT (OUTPATIENT)
Dept: SURGERY | Facility: HOSPITAL | Age: 57
DRG: 493 | End: 2022-01-23
Payer: MEDICARE

## 2022-01-23 LAB
BACTERIA SPEC AEROBE CULT: ABNORMAL
BACTERIA SPEC AEROBE CULT: ABNORMAL
POCT GLUCOSE: 152 MG/DL (ref 70–110)
POCT GLUCOSE: 169 MG/DL (ref 70–110)
POCT GLUCOSE: 236 MG/DL (ref 70–110)
POCT GLUCOSE: 288 MG/DL (ref 70–110)
VANCOMYCIN SERPL-MCNC: 13 UG/ML

## 2022-01-23 PROCEDURE — 25000003 PHARM REV CODE 250: Mod: HCNC | Performed by: INTERNAL MEDICINE

## 2022-01-23 PROCEDURE — 25000003 PHARM REV CODE 250: Mod: HCNC | Performed by: FAMILY MEDICINE

## 2022-01-23 PROCEDURE — 27000207 HC ISOLATION: Mod: HCNC

## 2022-01-23 PROCEDURE — 63600175 PHARM REV CODE 636 W HCPCS: Mod: HCNC | Performed by: FAMILY MEDICINE

## 2022-01-23 PROCEDURE — 36415 COLL VENOUS BLD VENIPUNCTURE: CPT | Mod: HCNC | Performed by: FAMILY MEDICINE

## 2022-01-23 PROCEDURE — 63600175 PHARM REV CODE 636 W HCPCS: Mod: HCNC | Performed by: INTERNAL MEDICINE

## 2022-01-23 PROCEDURE — 25000003 PHARM REV CODE 250: Mod: HCNC | Performed by: SURGERY

## 2022-01-23 PROCEDURE — 94761 N-INVAS EAR/PLS OXIMETRY MLT: CPT | Mod: HCNC

## 2022-01-23 PROCEDURE — 25000003 PHARM REV CODE 250: Mod: HCNC

## 2022-01-23 PROCEDURE — 80202 ASSAY OF VANCOMYCIN: CPT | Mod: HCNC | Performed by: FAMILY MEDICINE

## 2022-01-23 PROCEDURE — 11000001 HC ACUTE MED/SURG PRIVATE ROOM: Mod: HCNC

## 2022-01-23 RX ORDER — FUROSEMIDE 40 MG/1
40 TABLET ORAL 2 TIMES DAILY
Status: DISCONTINUED | OUTPATIENT
Start: 2022-01-23 | End: 2022-01-30 | Stop reason: HOSPADM

## 2022-01-23 RX ORDER — MUPIROCIN 20 MG/G
OINTMENT TOPICAL
Status: DISCONTINUED | OUTPATIENT
Start: 2022-01-23 | End: 2022-01-24

## 2022-01-23 RX ORDER — SODIUM CHLORIDE 9 MG/ML
INJECTION, SOLUTION INTRAVENOUS CONTINUOUS
Status: DISCONTINUED | OUTPATIENT
Start: 2022-01-23 | End: 2022-01-24

## 2022-01-23 RX ADMIN — MELATONIN TAB 3 MG 6 MG: 3 TAB at 08:01

## 2022-01-23 RX ADMIN — HYDROCODONE BITARTRATE AND ACETAMINOPHEN 1 TABLET: 5; 325 TABLET ORAL at 08:01

## 2022-01-23 RX ADMIN — INSULIN ASPART 1 UNITS: 100 INJECTION, SOLUTION INTRAVENOUS; SUBCUTANEOUS at 08:01

## 2022-01-23 RX ADMIN — FUROSEMIDE 40 MG: 40 TABLET ORAL at 09:01

## 2022-01-23 RX ADMIN — FUROSEMIDE 40 MG: 40 TABLET ORAL at 05:01

## 2022-01-23 RX ADMIN — MEROPENEM 1 G: 1 INJECTION, POWDER, FOR SOLUTION INTRAVENOUS at 11:01

## 2022-01-23 RX ADMIN — SODIUM CHLORIDE: 0.9 INJECTION, SOLUTION INTRAVENOUS at 11:01

## 2022-01-23 RX ADMIN — SODIUM BICARBONATE 650 MG: 650 TABLET ORAL at 08:01

## 2022-01-23 RX ADMIN — MEROPENEM 1 G: 1 INJECTION, POWDER, FOR SOLUTION INTRAVENOUS at 05:01

## 2022-01-23 RX ADMIN — PREGABALIN 150 MG: 75 CAPSULE ORAL at 05:01

## 2022-01-23 RX ADMIN — CLOPIDOGREL 75 MG: 75 TABLET, FILM COATED ORAL at 09:01

## 2022-01-23 RX ADMIN — ATORVASTATIN CALCIUM 80 MG: 40 TABLET, FILM COATED ORAL at 08:01

## 2022-01-23 RX ADMIN — ALLOPURINOL 100 MG: 100 TABLET ORAL at 09:01

## 2022-01-23 RX ADMIN — APIXABAN 5 MG: 5 TABLET, FILM COATED ORAL at 09:01

## 2022-01-23 RX ADMIN — SERTRALINE HYDROCHLORIDE 50 MG: 50 TABLET ORAL at 09:01

## 2022-01-23 RX ADMIN — COLLAGENASE SANTYL: 250 OINTMENT TOPICAL at 09:01

## 2022-01-23 RX ADMIN — VANCOMYCIN HYDROCHLORIDE 500 MG: 500 INJECTION, POWDER, LYOPHILIZED, FOR SOLUTION INTRAVENOUS at 06:01

## 2022-01-23 RX ADMIN — SODIUM BICARBONATE 650 MG: 650 TABLET ORAL at 09:01

## 2022-01-23 RX ADMIN — MIRTAZAPINE 15 MG: 7.5 TABLET ORAL at 08:01

## 2022-01-23 RX ADMIN — APIXABAN 5 MG: 5 TABLET, FILM COATED ORAL at 08:01

## 2022-01-23 RX ADMIN — PREGABALIN 150 MG: 75 CAPSULE ORAL at 09:01

## 2022-01-23 RX ADMIN — HYDROCODONE BITARTRATE AND ACETAMINOPHEN 1 TABLET: 5; 325 TABLET ORAL at 09:01

## 2022-01-23 RX ADMIN — HYDROCODONE BITARTRATE AND ACETAMINOPHEN 1 TABLET: 5; 325 TABLET ORAL at 04:01

## 2022-01-23 RX ADMIN — INSULIN ASPART 5 UNITS: 100 INJECTION, SOLUTION INTRAVENOUS; SUBCUTANEOUS at 11:01

## 2022-01-23 RX ADMIN — PREGABALIN 150 MG: 75 CAPSULE ORAL at 08:01

## 2022-01-23 RX ADMIN — INSULIN ASPART 5 UNITS: 100 INJECTION, SOLUTION INTRAVENOUS; SUBCUTANEOUS at 09:01

## 2022-01-23 RX ADMIN — MEROPENEM 1 G: 1 INJECTION, POWDER, FOR SOLUTION INTRAVENOUS at 09:01

## 2022-01-23 NOTE — PROGRESS NOTES
Pharmacokinetic Assessment Follow Up: IV Vancomycin    Vancomycin serum concentration assessment(s):    The random level was drawn correctly and can be used to guide therapy at this time. The measurement is within the desired definitive target range of 10 to 15 mcg/mL.    Vancomycin Regimen Plan:    Re-dose when the random level is less than 15 mcg/mL, next level to be drawn at 1/24/22 on 0400    Drug levels (last 3 results):  Recent Labs   Lab Result Units 01/21/22  0442 01/22/22  1140 01/23/22  0325   Vancomycin, Random ug/mL 18.1 17.7 13.0       Pharmacy will continue to follow and monitor vancomycin.    Please contact pharmacy at extension [phone] for questions regarding this assessment.    Thank you for the consult,   cM Mtz       Patient brief summary:  Suyapa Connelly is a 56 y.o. female initiated on antimicrobial therapy with IV Vancomycin for treatment of skin & soft tissue infection    The patient's current regimen is pulse dosing vanco 500mg     Drug Allergies:   Review of patient's allergies indicates:   Allergen Reactions    Ciprofloxacin Itching    Contrast media      Kidney injury    Iodine      Kidney injury       Actual Body Weight:   76kg    Renal Function:   Estimated Creatinine Clearance: 49.9 mL/min (based on SCr of 1.3 mg/dL).,     Dialysis Method (if applicable):  N/A    CBC (last 72 hours):  Recent Labs   Lab Result Units 01/21/22  0442 01/22/22  0335   WBC K/uL 7.53 7.40   Hemoglobin g/dL 7.8* 8.1*   Hematocrit % 25.2* 27.1*   Platelets K/uL 318 268   Gran % % 60.6 59.7   Lymph % % 23.1 23.1   Mono % % 11.0 12.0   Eosinophil % % 4.1 3.6   Basophil % % 0.8 1.2   Differential Method  Automated Automated       Metabolic Panel (last 72 hours):  Recent Labs   Lab Result Units 01/21/22  0442 01/22/22  0335   Sodium mmol/L 135* 136   Potassium mmol/L 3.5 4.6   Chloride mmol/L 105 105   CO2 mmol/L 20* 23   Glucose mg/dL 201* 249*   BUN mg/dL 34* 46*   Creatinine mg/dL 1.1 1.3   Magnesium  mg/dL 1.7 1.9       Vancomycin Administrations:  vancomycin given in the last 96 hours                     vancomycin 500 mg in dextrose 5 % 100 mL IVPB (ready to mix system) (mg) 500 mg New Bag 01/21/22 1215                    Microbiologic Results:  Microbiology Results (last 7 days)       Procedure Component Value Units Date/Time    Blood culture #1 **CANNOT BE ORDERED STAT** [[id number]] Collected: 01/17/22 1548    Order Status: Completed Specimen: Blood from Peripheral, Hand, Right Updated: 01/22/22 2012     Blood Culture, Routine No growth after 5 days.    Blood culture [[id number]] Collected: 01/19/22 0848    Order Status: Completed Specimen: Blood from Antecubital, Right Arm Updated: 01/22/22 1612     Blood Culture, Routine No Growth to date      No Growth to date      No Growth to date      No Growth to date    Narrative:      BC x 2, 30 min apart    Blood culture [[id number]] Collected: 01/19/22 0848    Order Status: Completed Specimen: Blood from Antecubital, Left Arm Updated: 01/22/22 1612     Blood Culture, Routine No Growth to date      No Growth to date      No Growth to date      No Growth to date    Aerobic culture [[id number]]  (Abnormal) Collected: 01/20/22 1451    Order Status: Completed Specimen: Wound from Leg, Right Updated: 01/22/22 1042     Aerobic Bacterial Culture PRESUMPTIVE PSEUDOMONAS SPECIES  Few  Identification and susceptibility pending        GRAM NEGATIVE YSABEL  Few  Identification and susceptibility pending      Aerobic culture (Specify Source) **CANNOT BE ORDERED AS STAT** [[id number]]  (Abnormal)  (Susceptibility) Collected: 01/17/22 1627    Order Status: Completed Specimen: Wound from Knee, Right Updated: 01/21/22 1049     Aerobic Bacterial Culture PSEUDOMONAS AERUGINOSA  Many        KLEBSIELLA PNEUMONIAE ESBL  Moderate      Culture, Anaerobe [[id number]] Collected: 01/20/22 1452    Order Status: Sent Specimen: Wound from Leg, Right Updated: 01/20/22 1932    Blood culture #2 **CANNOT  BE ORDERED STAT** [[id number]]  (Abnormal) Collected: 01/17/22 1548    Order Status: Completed Specimen: Blood from Peripheral, Hand, Right Updated: 01/20/22 1201     Blood Culture, Routine Gram stain salvatore bottle: Gram positive cocci in clusters resembling Staph       Results called to and read back by: Tita Tavarez RN 01/18/2022        19:36      Gram stain aer bottle: Gram positive cocci in clusters resembling Staph       Positive results previously called 01/19/2022  04:26      COAGULASE-NEGATIVE STAPHYLOCOCCUS SPECIES  Organism is a probable contaminant

## 2022-01-23 NOTE — PROGRESS NOTES
[facility] Medicine  Progress Note    Patient Name: Suyapa Connelly  MRN: [id number]  Patient Class: IP- Inpatient   Admission Date: 1/17/2022  Length of Stay: 3 days  Attending Physician: Jose Carlos Mar MD  Primary Care Provider: Magen Christensen MD        Subjective:     Principal Problem:Infected wound       HPI:  Suyapa Connelly is a 57 y/o female with PMHx CKD3, DMII, GERD, HLD, HTN, osteomyelitis, PVD, depression, and anxiety presents to ED with  with continue concern for wound infection.  Patient underwent right BKA by Dr. Rojas on 12/28 then discharged to rehab facility on 1/10.  Rehab facility staff became concerned for possible wound infection and patient was sent back to ED on 1/11.  Lab work was obtained, and images of wound were reviewed with Dr. Rojas, who recommended continued outpatient management.  At discharge patient was prescribed Augmentin, which she has continued taking as instructed.   at bedside is concern for progressively worsening appearance of wound, along with continued drainage.  Patient reporting worsened pain today described as sharp, worse with touch, radiating towards right knee, severity 8/10.  No fevers, chills, nausea, vomiting, chest pain, cough, shortness of breath, abdominal pain, urinary or bowel complications.  She does admit to not taking her BP medication today.  No other acute complaints at this time. Will admit to hospital medicine for observation services. General surgery consulted.         Overview/Hospital Course:  Pt placed in observation for evaluation of wound infection.  Pt s/p R BKA with Dr. Rojas on 12/28/21.  Vanc and cefazolin initiated.  Wound culture presumptive pseudomonas species cefazolin discontinued and changed to cefepime.  UC candida and BC 1 of 2 bottles coag-neg staph suspected contaminant.  Gen Surg consulted and recommended santyl, xeroform, 4 x 4, kerlix and ace bandage daily.  Wound culture pseudomonas  sensitive to cefepime. GNR with I&S pending.     PT/OT recommended rehab facility.  WC consulted and unstageable sacral decub.  Recommended waffle overlay and triad ointment to L buttocks BID and prn incontinent episode.       Interval History: no acute event overnight. BP fluctuating, initiate lasix BID, continue meropenem and vanc - prelim culture positive for GNR and pseudo.    Review of Systems       Constitutional: Negative for chills, diaphoresis, fatigue and fever.   HENT: Negative for hearing loss and sore throat.    Eyes: Negative for visual disturbance.   Respiratory: Negative for cough and shortness of breath.    Cardiovascular: Negative for chest pain and leg swelling.   Gastrointestinal: Negative for abdominal pain, diarrhea, nausea and vomiting.   Genitourinary: Negative for difficulty urinating and flank pain.   Musculoskeletal: Negative for back pain.   Skin: Positive for wound. Negative for rash.   Neurological: Negative for dizziness, weakness and headaches.   Psychiatric/Behavioral: Negative for agitation and confusion.     Objective:     Vital Signs (Most Recent):  Temp: 98.3 °F (36.8 °C) (01/23/22 0353)  Pulse: 90 (01/23/22 0353)  Resp: 15 (01/23/22 0402)  BP: (!) 175/81 (01/23/22 0353)  SpO2: 100 % (01/23/22 0353) Vital Signs (24h Range):  Temp:  [98.2 °F (36.8 °C)-98.7 °F (37.1 °C)] 98.3 °F (36.8 °C)  Pulse:  [] 90  Resp:  [15-18] 15  SpO2:  [99 %-100 %] 100 %  BP: (139-175)/(64-81) 175/81     Weight: 76.2 kg (167 lb 15.9 oz)  Body mass index is 27.96 kg/m².    Intake/Output Summary (Last 24 hours) at 1/23/2022 0806  Last data filed at 1/22/2022 1746  Gross per 24 hour   Intake 186.59 ml   Output --   Net 186.59 ml      Physical Exam    Constitutional:       General: She is not in acute distress.     Appearance: She is obese. She is not ill-appearing.   HENT:      Head: Normocephalic and atraumatic.      Mouth/Throat:      Mouth: Mucous membranes are moist.   Eyes:      Extraocular  Movements: Extraocular movements intact.      Pupils: Pupils are equal, round, and reactive to light.   Cardiovascular:      Rate and Rhythm: Normal rate and regular rhythm.      Pulses: Normal pulses.      Heart sounds: Normal heart sounds.   Pulmonary:      Effort: Pulmonary effort is normal.   Abdominal:      General: Bowel sounds are normal.      Palpations: Abdomen is soft.   Musculoskeletal:         General: No swelling.      Cervical back: Normal range of motion and neck supple.      Right Lower Extremity: Right leg is amputated below knee.      Left Lower Extremity: Left leg is amputated below knee.   Feet:      Right foot:      Skin integrity: No erythema or warmth.      Comments: S/p right BKA 12/28.  Staples intact. Wound dehisced, primary to lateral surface.  Eschar noted primarily to lateral surface. Mild surrounding erythema.    Left BKA at baseline, uncomplicated.  No open wounds.  Skin:     General: Skin is warm and dry.      Capillary Refill: Capillary refill takes less than 2 seconds.      Comments: unstageable sacral decubitus   Neurological:      General: No focal deficit present.      Mental Status: She is alert and oriented to person, place, and time.   Psychiatric:         Mood and Affect: Mood normal.         Behavior: Behavior normal. Behavior is cooperative.              Significant Labs:   Lab Results   Component Value Date    WBC 7.40 01/22/2022    HGB 8.1 (L) 01/22/2022    HCT 27.1 (L) 01/22/2022     (H) 01/22/2022     01/22/2022       CMP  Sodium   Date Value Ref Range Status   01/22/2022 136 136 - 145 mmol/L Final     Potassium   Date Value Ref Range Status   01/22/2022 4.6 3.5 - 5.1 mmol/L Final     Chloride   Date Value Ref Range Status   01/22/2022 105 95 - 110 mmol/L Final     CO2   Date Value Ref Range Status   01/22/2022 23 23 - 29 mmol/L Final     Glucose   Date Value Ref Range Status   01/22/2022 249 (H) 70 - 110 mg/dL Final     BUN   Date Value Ref Range Status    01/22/2022 46 (H) 6 - 20 mg/dL Final     Creatinine   Date Value Ref Range Status   01/22/2022 1.3 0.5 - 1.4 mg/dL Final     Calcium   Date Value Ref Range Status   01/22/2022 8.2 (L) 8.7 - 10.5 mg/dL Final     Total Protein   Date Value Ref Range Status   01/18/2022 6.0 6.0 - 8.4 g/dL Final     Albumin   Date Value Ref Range Status   01/18/2022 1.6 (L) 3.5 - 5.2 g/dL Final     Total Bilirubin   Date Value Ref Range Status   01/18/2022 0.2 0.1 - 1.0 mg/dL Final     Comment:     For infants and newborns, interpretation of results should be based  on gestational age, weight and in agreement with clinical  observations.    Premature Infant recommended reference ranges:  Up to 24 hours.............<8.0 mg/dL  Up to 48 hours............<12.0 mg/dL  3-5 days..................<15.0 mg/dL  6-29 days.................<15.0 mg/dL       Alkaline Phosphatase   Date Value Ref Range Status   01/18/2022 100 55 - 135 U/L Final     AST   Date Value Ref Range Status   01/18/2022 14 10 - 40 U/L Final     ALT   Date Value Ref Range Status   01/18/2022 13 10 - 44 U/L Final     Anion Gap   Date Value Ref Range Status   01/22/2022 8 8 - 16 mmol/L Final     eGFR if    Date Value Ref Range Status   01/22/2022 53 (A) >60 mL/min/1.73 m^2 Final     eGFR if non    Date Value Ref Range Status   01/22/2022 46 (A) >60 mL/min/1.73 m^2 Final     Comment:     Calculation used to obtain the estimated glomerular filtration  rate (eGFR) is the CKD-EPI equation.            Significant Imaging:     Assessment/Plan:      Active Diagnoses:    Diagnosis Date Noted POA    PRINCIPAL PROBLEM:  Infected wound [T14.8XXA, L08.9] 01/17/2022 Yes    Decubitus ulcer of sacral region, unstageable [L89.150] 01/18/2022 Unknown    S/P BKA (below knee amputation) unilateral, right [Z89.511] 12/30/2021 Not Applicable    Phantom limb syndrome with pain [G54.6] 07/12/2021 Yes    Chronic anemia [D64.9] 03/23/2021 Yes    Type 2 diabetes  mellitus with diabetic nephropathy, with long-term current use of insulin [E11.21, Z79.4] 02/24/2021 Not Applicable    Paroxysmal atrial fibrillation [I48.0] 01/28/2020 Yes     Chronic    Mixed hyperlipidemia [E78.2] 12/13/2019 Yes     Chronic    MDD (major depressive disorder), recurrent episode, moderate [F33.1] 05/07/2018 Yes     Chronic    Peripheral vascular disease [I73.9] 10/06/2015 Yes      Problems Resolved During this Admission:     VTE Risk Mitigation (From admission, onward)         Ordered     apixaban tablet 5 mg  2 times daily         01/17/22 1920     IP VTE HIGH RISK PATIENT  Once         01/17/22 1658     Place sequential compression device  Until discontinued         01/17/22 1658                        * Infected wound  S/p right bka with Dr. Rojas on 12/28/21  Presents to ED with concerns for wound infection  BKA site with dehisced wound  Received zosyn x1  Vancomycin and cefazolin initiated, cultures pseudomonas.  Cefazolin changed to cefepime and Vanc.  General surgery consulted for possible debridement: check cultures , apply santyl , xeroform , 4 x 4 , kerlix , and ace bandage daily, iv antibiotics  ID consulted for recommendations on antibiotic therapy: vanc, meropenem  Await final cultures. Prelim pseudomonas and GNR  PT/OT: rehab        Decubitus ulcer of sacral region, unstageable  Wound Care assistance appreciated  Recommended waffle overlay and triad ointment to L buttocks BID and prn incontinent episode.     S/P BKA (below knee amputation) unilateral, right  See above        Phantom limb syndrome with pain  Resume home lyrica  Norco q4hr prn      Type 2 diabetes mellitus with diabetic nephropathy, with long-term current use of insulin                Hemoglobin A1C   Date Value Ref Range Status   12/03/2021 9.6 (H) 4.0 - 5.6 % Final       Comment:       ADA Screening Guidelines:  5.7-6.4%  Consistent with prediabetes  >or=6.5%  Consistent with diabetes     High levels of fetal  hemoglobin interfere with the HbA1C  assay. Heterozygous hemoglobin variants (HbS, HgC, etc)do  not significantly interfere with this assay.   However, presence of multiple variants may affect accuracy.                                                             Patient's FSGs are uncontrolled due to hyperglycemia on current medication regimen.  - home regimen includes detemir, aspart  - hold home oral medications  - increased scheduled doses: detemir 10U qhs, aspart 2U tid plus scale will increase to 5U tid. Controlled.  - LDSSI with ACCUcheck ACHS  - Diabetic diet  -Hypoglycemia protocol PRN           Paroxysmal atrial fibrillation  Patient with paroxymal afib  Resumed home OAC, rate controlled  Monitor on telemetry              Mixed hyperlipidemia  Last LDL was             Lab Results   Component Value Date     LDLCALC 177.6 (H) 12/03/2021      Patient is chronically on statin.will continue for now.                  MDD (major depressive disorder), recurrent episode, moderate  Resumed zoloft, stable        Peripheral vascular disease  Resume statin         Chronic anemia  Stable and at baseline           Jose Carlos Mar MD  Department of Hospital Medicine   [facility]

## 2022-01-23 NOTE — PROGRESS NOTES
"Surgery follow up  BP (!) 126/57 (Patient Position: Lying)   Pulse 94   Temp 98.1 °F (36.7 °C) (Oral)   Resp 18   Ht 5' 5" (1.651 m)   Wt 76.2 kg (167 lb 15.9 oz)   LMP 09/30/2015 (Exact Date)   SpO2 100%   BMI 27.96 kg/m²   I/O last 3 completed shifts:  In: 338.2 [I.V.:10; IV Piggyback:328.2]  Out: -   No intake/output data recorded.      Recent Results (from the past 336 hour(s))   CBC Auto Differential    Collection Time: 01/22/22  3:35 AM   Result Value Ref Range    WBC 7.40 3.90 - 12.70 K/uL    Hemoglobin 8.1 (L) 12.0 - 16.0 g/dL    Hematocrit 27.1 (L) 37.0 - 48.5 %    Platelets 268 150 - 450 K/uL   CBC Auto Differential    Collection Time: 01/21/22  4:42 AM   Result Value Ref Range    WBC 7.53 3.90 - 12.70 K/uL    Hemoglobin 7.8 (L) 12.0 - 16.0 g/dL    Hematocrit 25.2 (L) 37.0 - 48.5 %    Platelets 318 150 - 450 K/uL   CBC Auto Differential    Collection Time: 01/20/22  5:08 AM   Result Value Ref Range    WBC 7.52 3.90 - 12.70 K/uL    Hemoglobin 8.5 (L) 12.0 - 16.0 g/dL    Hematocrit 27.3 (L) 37.0 - 48.5 %    Platelets 333 150 - 450 K/uL   drainage present , for debridement right BKA stump in am  "

## 2022-01-23 NOTE — PLAN OF CARE
Pt. AAOx4. No c/o N/V during shift. C/O R stump phantom pain. PRN norco administered. Contact precautions maintained. Wound care completed per orders. BS monitored. Call light in reach, Pt. Instructed to call for assistance.     Problem: Adult Inpatient Plan of Care  Goal: Plan of Care Review  Outcome: Ongoing, Progressing  Goal: Patient-Specific Goal (Individualized)  Outcome: Ongoing, Progressing  Goal: Absence of Hospital-Acquired Illness or Injury  Outcome: Ongoing, Progressing  Goal: Optimal Comfort and Wellbeing  Outcome: Ongoing, Progressing  Goal: Readiness for Transition of Care  Outcome: Ongoing, Progressing     Problem: Diabetes Comorbidity  Goal: Blood Glucose Level Within Targeted Range  Outcome: Ongoing, Progressing     Problem: Fluid and Electrolyte Imbalance (Acute Kidney Injury/Impairment)  Goal: Fluid and Electrolyte Balance  Outcome: Ongoing, Progressing     Problem: Oral Intake Inadequate (Acute Kidney Injury/Impairment)  Goal: Optimal Nutrition Intake  Outcome: Ongoing, Progressing     Problem: Renal Function Impairment (Acute Kidney Injury/Impairment)  Goal: Effective Renal Function  Outcome: Ongoing, Progressing     Problem: Infection  Goal: Absence of Infection Signs and Symptoms  Outcome: Ongoing, Progressing     Problem: Impaired Wound Healing  Goal: Optimal Wound Healing  Outcome: Ongoing, Progressing     Problem: Fall Injury Risk  Goal: Absence of Fall and Fall-Related Injury  Outcome: Ongoing, Progressing     Problem: Skin Injury Risk Increased  Goal: Skin Health and Integrity  Outcome: Ongoing, Progressing     Problem: Hypertension Comorbidity  Goal: Blood Pressure in Desired Range  Outcome: Ongoing, Progressing

## 2022-01-23 NOTE — ASSESSMENT & PLAN NOTE
55 y/o female with PMHx CKD3, DMII, GERD, HLD, HTN, osteomyelitis, PVD, depression, and anxiety presents to ED with  with continue concern for wound infection of right BKA wound. BC 1/17 - 1 of 2 sets with coag negative staph - likely contaminant. Right knee wound 1/17 positive for pseudomonas and Klebsiella ESBL. Right leg wound culture positive for pseudomonas and GNR.       -await final cxs - continue meropenem   -could stop vancomycin if no gram positives isolated from 1/20 cultures  surgery plans further debridement soon

## 2022-01-23 NOTE — NURSING
"Pt's  is very hostile and agitated. Pt's  is screaming at the whole staff. He complains about the way his wife is being changed and cleaned. He says his wife has been dirty for 3-4 days. He also states the PCT did not wipe and change her brief properly. The pt. did not have a problem with the way she was changed and told the PCT she did an awesome job. The  started yelling at the pt. telling her, "Every time he's correcting the workers she has a problem with it." He also stated, " she needs to stop defending them." The PCT told the pt. to notify the RN or Pct when she has to urinate so we can come and change her promptly. The pt's  hollered at pct stating, "Do you think she doesn't know when they hell she pisses on herself." PCT left out of pt's room and returned to the nursing station. Five min. later the pt's  returned to the nursing station a second time screaming and requesting to speak with a charge nurse. He talked with the charge nurse telling her no one came to check to see how the patient was changed. RN went to the pt's room with the PCT. RN helped PCT change the pt. finding out the pt. was cleaned but still went through with fully cleaning the pt. again. The  stood over PCT and RN screaming and Male RN came into room to assist. The pt. moved back to the corner of the room while male RN waited until the RN and PCT finished. Around 0330am RN and PCT returned to change the pt. again. The pt's  was still hostile stating, " and  people are treated way better than this." He screamed we are the reason she had the pressure ulcer and both bilateral amputations and he repeatedly screamed at PCT and RN. PCT was very frightened to tears. Security was called alerting them about the 's behavior and we may have to call a possible code white. Pt.'s  ended up leaving 30 min later, stating he will return back. Charge nurse alerted the nursing staff " for safety measures. SOS was documented.

## 2022-01-23 NOTE — PLAN OF CARE
Pt. AAox4, administered all medications per max. Pt. administered PRN Norco for pain. Pt. turned Q2 and repositioned frequently. RN and PCT changed pt.'s brief X3 during the shift. IV ABX infusing. Safety precautions maintained. Will continue to monitor.

## 2022-01-23 NOTE — SUBJECTIVE & OBJECTIVE
"Interval History: Patient complained of nausea but no vomiting. She stated that surgery is planned for Monday.     Review of Systems   Respiratory: Negative for shortness of breath.    Gastrointestinal: Positive for nausea. Negative for diarrhea and vomiting.     Antibiotics (From admission, onward)            Start     Stop Route Frequency Ordered    01/21/22 2330  meropenem 1 g in sodium chloride 0.9 % 100 mL IVPB (ready to mix system)         -- IV Every 8 hours (non-standard times) 01/21/22 2222 01/17/22 2021  vancomycin - pharmacy to dose  (vancomycin IVPB)        "And" Linked Group Details    -- IV pharmacy to manage frequency 01/17/22 1922        Objective:     Vital Signs (Most Recent):  Temp: 98.7 °F (37.1 °C) (01/22/22 1625)  Pulse: 101 (01/22/22 1625)  Resp: 18 (01/22/22 1656)  BP: (!) 145/65 (01/22/22 1625)  SpO2: 100 % (01/22/22 0742) Vital Signs (24h Range):  Temp:  [96.4 °F (35.8 °C)-98.7 °F (37.1 °C)] 98.7 °F (37.1 °C)  Pulse:  [] 101  Resp:  [17-20] 18  SpO2:  [98 %-100 %] 100 %  BP: (135-148)/(63-67) 145/65     Weight: 76.2 kg (167 lb 15.9 oz)  Body mass index is 27.96 kg/m².    Estimated Creatinine Clearance: 49.9 mL/min (based on SCr of 1.3 mg/dL).    Physical Exam  Cardiovascular:      Heart sounds: Normal heart sounds.   Pulmonary:      Breath sounds: Normal breath sounds.   Abdominal:      General: Bowel sounds are normal. There is no distension.      Palpations: Abdomen is soft.      Tenderness: There is no abdominal tenderness.   Musculoskeletal:      Right lower leg: No edema.      Left lower leg: No edema.      Comments: Right BKA wound wrapped and left AKA scar well healed with no lesions noted         Significant Labs:   Blood Culture:   Recent Labs   Lab 12/30/21  1412 01/02/22  1938 01/17/22  1548 01/19/22  0848   LABBLOO No growth after 5 days.  No growth after 5 days. No growth after 5 days.  No growth after 5 days. No Growth to date  No Growth to date  No Growth to date "  No Growth to date  No Growth to date  Gram stain salvatore bottle: Gram positive cocci in clusters resembling Staph   Results called to and read back by: Tita Tavarez RN 01/18/2022    19:36  Gram stain aer bottle: Gram positive cocci in clusters resembling Staph   Positive results previously called 01/19/2022  04:26  COAGULASE-NEGATIVE STAPHYLOCOCCUS SPECIES  Organism is a probable contaminant  * No Growth to date  No Growth to date  No Growth to date  No Growth to date  No Growth to date  No Growth to date  No Growth to date  No Growth to date     CBC:   Recent Labs   Lab 01/21/22  0442 01/22/22  0335   WBC 7.53 7.40   HGB 7.8* 8.1*   HCT 25.2* 27.1*    268     CMP:   Recent Labs   Lab 01/21/22 0442 01/22/22  0335   * 136   K 3.5 4.6    105   CO2 20* 23   * 249*   BUN 34* 46*   CREATININE 1.1 1.3   CALCIUM 8.6* 8.2*   ANIONGAP 10 8   EGFRNONAA 57* 46*       Urine Culture:   Recent Labs   Lab 12/21/21  2204 01/03/22  1724   LABURIN No growth CANDIDA GLABRATA  > 100,000 cfu/ml  Treatment of asymptomatic candiduria is not recommended (except for   specific populations). Candida isolated in the urine typically   represents colonization. If an indwelling urinary catheter is present  it should be removed or replaced.  *     Urine Studies:   Recent Labs   Lab 01/03/22  1724   COLORU Yellow   APPEARANCEUA Cloudy*   PHUR 6.0   SPECGRAV 1.020   PROTEINUA 3+*   GLUCUA Trace*   KETONESU Negative   BILIRUBINUA Negative   OCCULTUA 2+*   NITRITE Negative   UROBILINOGEN Negative   LEUKOCYTESUR 3+*   RBCUA 12*   WBCUA >100*   BACTERIA Moderate*   SQUAMEPITHEL 14   HYALINECASTS 0     Wound Culture:   Recent Labs   Lab 10/14/21  1108 01/17/22  1627 01/20/22  1451   LABAERO KLEBSIELLA PNEUMONIAE ESBL  Moderate  * PSEUDOMONAS AERUGINOSA  Many  *  KLEBSIELLA PNEUMONIAE ESBL  Moderate  * PRESUMPTIVE PSEUDOMONAS SPECIES  Few  Identification and susceptibility pending  *  GRAM NEGATIVE  YSABEL  Few  Identification and susceptibility pending  *       Significant Imaging: no new images since 1/3

## 2022-01-23 NOTE — PROGRESS NOTES
[facility] Surg  Infectious Disease  Progress Note    Patient Name: Suyapa Connelly  MRN: [id number]  Admission Date: 1/17/2022  Length of Stay: 2 days  Attending Physician: Jose Carlos Mar MD  Primary Care Provider: Magen Christensen MD    Isolation Status: Contact  Assessment/Plan:      * Infected wound  55 y/o female with PMHx CKD3, DMII, GERD, HLD, HTN, osteomyelitis, PVD, depression, and anxiety presents to ED with  with continue concern for wound infection of right BKA wound. BC 1/17 - 1 of 2 sets with coag negative staph - likely contaminant. Right knee wound 1/17 positive for pseudomonas and Klebsiella ESBL. Right leg wound culture positive for pseudomonas and GNR.       -await final cxs - continue meropenem   -could stop vancomycin if no gram positives isolated from 1/20 cultures  surgery plans further debridement soon            Anticipated Disposition: unclear    Thank you for your consult. I will follow-up with patient. Please contact us if you have any additional questions.[phone]    Geno Sethi MD  Infectious Disease  [place] - Med Surg    Subjective:     Principal Problem:Infected wound    HPI: 55 y/o female with PMHx CKD3, DMII, GERD, HLD, HTN, osteomyelitis, PVD, depression, and anxiety presents to ED with  with continue concern for wound infection.  Patient underwent right BKA by Dr. Rojas on 12/28 then discharged to rehab facility on 1/10.  Rehab facility staff became concerned for possible wound infection and patient was sent back to ED on 1/11.  Lab work was obtained, and images of wound were reviewed with Dr. Rojas, who recommended continued outpatient management.  At discharge patient was prescribed Augmentin, which she has continued taking as instructed.   at bedside is concern for progressively worsening appearance of wound, along with continued drainage.  Patient reporting worsened pain today described as sharp, worse with touch, radiating towards right knee,  "severity 8/10.  No fevers, chills, nausea, vomiting, chest pain, cough, shortness of breath, abdominal pain, urinary or bowel complications      ID is consulted 1/19/22 for infection of BKA site with wound cxs growing presumptive pseudomonas  1/20/22 - new gram negative favio on culture in addition to the pseudomonas; continue vanco and cefepime  1/21/22 - the right knee culture from 1/17 positive for pseudomonas and Klebsiella ESBL - changed cefepime to meropenem and continued vancomycin  1/22/22 - right leg wound from 1/20/22 positive for pseudomonas and GNR    Interval History: Patient complained of nausea but no vomiting. She stated that surgery is planned for Monday.     Review of Systems   Respiratory: Negative for shortness of breath.    Gastrointestinal: Positive for nausea. Negative for diarrhea and vomiting.     Antibiotics (From admission, onward)            Start     Stop Route Frequency Ordered    01/21/22 2330  meropenem 1 g in sodium chloride 0.9 % 100 mL IVPB (ready to mix system)         -- IV Every 8 hours (non-standard times) 01/21/22 2222 01/17/22 2021  vancomycin - pharmacy to dose  (vancomycin IVPB)        "And" Linked Group Details    -- IV pharmacy to manage frequency 01/17/22 1922        Objective:     Vital Signs (Most Recent):  Temp: 98.7 °F (37.1 °C) (01/22/22 1625)  Pulse: 101 (01/22/22 1625)  Resp: 18 (01/22/22 1656)  BP: (!) 145/65 (01/22/22 1625)  SpO2: 100 % (01/22/22 0742) Vital Signs (24h Range):  Temp:  [96.4 °F (35.8 °C)-98.7 °F (37.1 °C)] 98.7 °F (37.1 °C)  Pulse:  [] 101  Resp:  [17-20] 18  SpO2:  [98 %-100 %] 100 %  BP: (135-148)/(63-67) 145/65     Weight: 76.2 kg (167 lb 15.9 oz)  Body mass index is 27.96 kg/m².    Estimated Creatinine Clearance: 49.9 mL/min (based on SCr of 1.3 mg/dL).    Physical Exam  Cardiovascular:      Heart sounds: Normal heart sounds.   Pulmonary:      Breath sounds: Normal breath sounds.   Abdominal:      General: Bowel sounds are normal. " There is no distension.      Palpations: Abdomen is soft.      Tenderness: There is no abdominal tenderness.   Musculoskeletal:      Right lower leg: No edema.      Left lower leg: No edema.      Comments: Right BKA wound wrapped and left AKA scar well healed with no lesions noted         Significant Labs:   Blood Culture:   Recent Labs   Lab 12/30/21  1412 01/02/22  1938 01/17/22  1548 01/19/22  0848   LABBLOO No growth after 5 days.  No growth after 5 days. No growth after 5 days.  No growth after 5 days. No Growth to date  No Growth to date  No Growth to date  No Growth to date  No Growth to date  Gram stain salvatore bottle: Gram positive cocci in clusters resembling Staph   Results called to and read back by: Tita Tavarez RN 01/18/2022    19:36  Gram stain aer bottle: Gram positive cocci in clusters resembling Staph   Positive results previously called 01/19/2022  04:26  COAGULASE-NEGATIVE STAPHYLOCOCCUS SPECIES  Organism is a probable contaminant  * No Growth to date  No Growth to date  No Growth to date  No Growth to date  No Growth to date  No Growth to date  No Growth to date  No Growth to date     CBC:   Recent Labs   Lab 01/21/22  0442 01/22/22  0335   WBC 7.53 7.40   HGB 7.8* 8.1*   HCT 25.2* 27.1*    268     CMP:   Recent Labs   Lab 01/21/22  0442 01/22/22  0335   * 136   K 3.5 4.6    105   CO2 20* 23   * 249*   BUN 34* 46*   CREATININE 1.1 1.3   CALCIUM 8.6* 8.2*   ANIONGAP 10 8   EGFRNONAA 57* 46*       Urine Culture:   Recent Labs   Lab 12/21/21  2204 01/03/22  1724   LABURIN No growth CANDIDA GLABRATA  > 100,000 cfu/ml  Treatment of asymptomatic candiduria is not recommended (except for   specific populations). Candida isolated in the urine typically   represents colonization. If an indwelling urinary catheter is present  it should be removed or replaced.  *     Urine Studies:   Recent Labs   Lab 01/03/22  1724   COLORU Yellow   APPEARANCEUA Cloudy*    PHUR 6.0   SPECGRAV 1.020   PROTEINUA 3+*   GLUCUA Trace*   KETONESU Negative   BILIRUBINUA Negative   OCCULTUA 2+*   NITRITE Negative   UROBILINOGEN Negative   LEUKOCYTESUR 3+*   RBCUA 12*   WBCUA >100*   BACTERIA Moderate*   SQUAMEPITHEL 14   HYALINECASTS 0     Wound Culture:   Recent Labs   Lab 10/14/21  1108 01/17/22  1627 01/20/22  1451   LABAERO KLEBSIELLA PNEUMONIAE ESBL  Moderate  * PSEUDOMONAS AERUGINOSA  Many  *  KLEBSIELLA PNEUMONIAE ESBL  Moderate  * PRESUMPTIVE PSEUDOMONAS SPECIES  Few  Identification and susceptibility pending  *  GRAM NEGATIVE YSABEL  Few  Identification and susceptibility pending  *       Significant Imaging: no new images since 1/3

## 2022-01-24 ENCOUNTER — PATIENT OUTREACH (OUTPATIENT)
Dept: ADMINISTRATIVE | Facility: OTHER | Age: 57
End: 2022-01-24
Payer: MEDICARE

## 2022-01-24 ENCOUNTER — ANESTHESIA (OUTPATIENT)
Dept: SURGERY | Facility: HOSPITAL | Age: 57
DRG: 493 | End: 2022-01-24
Payer: MEDICARE

## 2022-01-24 LAB
BACTERIA BLD CULT: NORMAL
BACTERIA BLD CULT: NORMAL
POCT GLUCOSE: 175 MG/DL (ref 70–110)
POCT GLUCOSE: 227 MG/DL (ref 70–110)
POCT GLUCOSE: 229 MG/DL (ref 70–110)
POCT GLUCOSE: 362 MG/DL (ref 70–110)
SARS-COV-2 RDRP RESP QL NAA+PROBE: NEGATIVE

## 2022-01-24 PROCEDURE — 36000707: Mod: HCNC | Performed by: SURGERY

## 2022-01-24 PROCEDURE — 27000207 HC ISOLATION: Mod: HCNC

## 2022-01-24 PROCEDURE — 37000009 HC ANESTHESIA EA ADD 15 MINS: Mod: HCNC | Performed by: SURGERY

## 2022-01-24 PROCEDURE — 63600175 PHARM REV CODE 636 W HCPCS: Mod: HCNC

## 2022-01-24 PROCEDURE — 25000003 PHARM REV CODE 250: Mod: HCNC | Performed by: SURGERY

## 2022-01-24 PROCEDURE — 11000001 HC ACUTE MED/SURG PRIVATE ROOM: Mod: HCNC

## 2022-01-24 PROCEDURE — 27201423 OPTIME MED/SURG SUP & DEVICES STERILE SUPPLY: Mod: HCNC | Performed by: SURGERY

## 2022-01-24 PROCEDURE — 63600175 PHARM REV CODE 636 W HCPCS: Mod: HCNC | Performed by: FAMILY MEDICINE

## 2022-01-24 PROCEDURE — 88304 PR  SURG PATH,LEVEL III: ICD-10-PCS | Mod: 26,HCNC,, | Performed by: STUDENT IN AN ORGANIZED HEALTH CARE EDUCATION/TRAINING PROGRAM

## 2022-01-24 PROCEDURE — 36000706: Mod: HCNC | Performed by: SURGERY

## 2022-01-24 PROCEDURE — 63600175 PHARM REV CODE 636 W HCPCS: Mod: HCNC | Performed by: ANESTHESIOLOGY

## 2022-01-24 PROCEDURE — 63600175 PHARM REV CODE 636 W HCPCS: Mod: HCNC | Performed by: NURSE ANESTHETIST, CERTIFIED REGISTERED

## 2022-01-24 PROCEDURE — 88304 TISSUE EXAM BY PATHOLOGIST: CPT | Mod: 26,HCNC,, | Performed by: STUDENT IN AN ORGANIZED HEALTH CARE EDUCATION/TRAINING PROGRAM

## 2022-01-24 PROCEDURE — 37000008 HC ANESTHESIA 1ST 15 MINUTES: Mod: HCNC | Performed by: SURGERY

## 2022-01-24 PROCEDURE — 88304 TISSUE EXAM BY PATHOLOGIST: CPT | Mod: HCNC | Performed by: STUDENT IN AN ORGANIZED HEALTH CARE EDUCATION/TRAINING PROGRAM

## 2022-01-24 PROCEDURE — 94761 N-INVAS EAR/PLS OXIMETRY MLT: CPT | Mod: HCNC

## 2022-01-24 PROCEDURE — 63600175 PHARM REV CODE 636 W HCPCS: Mod: HCNC | Performed by: SURGERY

## 2022-01-24 PROCEDURE — 71000039 HC RECOVERY, EACH ADD'L HOUR: Mod: HCNC | Performed by: SURGERY

## 2022-01-24 PROCEDURE — C1729 CATH, DRAINAGE: HCPCS | Mod: HCNC | Performed by: SURGERY

## 2022-01-24 PROCEDURE — U0002 COVID-19 LAB TEST NON-CDC: HCPCS | Mod: HCNC | Performed by: FAMILY MEDICINE

## 2022-01-24 PROCEDURE — 25000003 PHARM REV CODE 250: Mod: HCNC | Performed by: NURSE ANESTHETIST, CERTIFIED REGISTERED

## 2022-01-24 PROCEDURE — 71000033 HC RECOVERY, INTIAL HOUR: Mod: HCNC | Performed by: SURGERY

## 2022-01-24 PROCEDURE — 25000003 PHARM REV CODE 250: Mod: HCNC

## 2022-01-24 RX ORDER — HYDROMORPHONE HYDROCHLORIDE 2 MG/ML
INJECTION, SOLUTION INTRAMUSCULAR; INTRAVENOUS; SUBCUTANEOUS
Status: COMPLETED
Start: 2022-01-24 | End: 2022-01-24

## 2022-01-24 RX ORDER — CEFAZOLIN SODIUM 1 G/3ML
INJECTION, POWDER, FOR SOLUTION INTRAMUSCULAR; INTRAVENOUS
Status: DISCONTINUED | OUTPATIENT
Start: 2022-01-24 | End: 2022-01-24

## 2022-01-24 RX ORDER — HYDROMORPHONE HYDROCHLORIDE 1 MG/ML
1 INJECTION, SOLUTION INTRAMUSCULAR; INTRAVENOUS; SUBCUTANEOUS ONCE
Status: COMPLETED | OUTPATIENT
Start: 2022-01-24 | End: 2022-01-24

## 2022-01-24 RX ORDER — GABAPENTIN 100 MG/1
100 CAPSULE ORAL 3 TIMES DAILY
Status: DISCONTINUED | OUTPATIENT
Start: 2022-01-24 | End: 2022-01-24

## 2022-01-24 RX ORDER — ONDANSETRON 2 MG/ML
INJECTION INTRAMUSCULAR; INTRAVENOUS
Status: DISCONTINUED | OUTPATIENT
Start: 2022-01-24 | End: 2022-01-24

## 2022-01-24 RX ORDER — SODIUM CHLORIDE, SODIUM LACTATE, POTASSIUM CHLORIDE, CALCIUM CHLORIDE 600; 310; 30; 20 MG/100ML; MG/100ML; MG/100ML; MG/100ML
INJECTION, SOLUTION INTRAVENOUS CONTINUOUS PRN
Status: DISCONTINUED | OUTPATIENT
Start: 2022-01-24 | End: 2022-01-24

## 2022-01-24 RX ORDER — PROPOFOL 10 MG/ML
VIAL (ML) INTRAVENOUS CONTINUOUS PRN
Status: DISCONTINUED | OUTPATIENT
Start: 2022-01-24 | End: 2022-01-24

## 2022-01-24 RX ORDER — FENTANYL CITRATE 50 UG/ML
INJECTION, SOLUTION INTRAMUSCULAR; INTRAVENOUS
Status: DISCONTINUED | OUTPATIENT
Start: 2022-01-24 | End: 2022-01-24

## 2022-01-24 RX ORDER — SODIUM CHLORIDE 0.9 % (FLUSH) 0.9 %
10 SYRINGE (ML) INJECTION
Status: DISCONTINUED | OUTPATIENT
Start: 2022-01-24 | End: 2022-01-30 | Stop reason: HOSPADM

## 2022-01-24 RX ORDER — HYDROMORPHONE HYDROCHLORIDE 2 MG/ML
0.5 INJECTION, SOLUTION INTRAMUSCULAR; INTRAVENOUS; SUBCUTANEOUS EVERY 5 MIN PRN
Status: DISCONTINUED | OUTPATIENT
Start: 2022-01-24 | End: 2022-01-25

## 2022-01-24 RX ORDER — LIDOCAINE HCL/PF 100 MG/5ML
SYRINGE (ML) INTRAVENOUS
Status: DISCONTINUED | OUTPATIENT
Start: 2022-01-24 | End: 2022-01-24

## 2022-01-24 RX ORDER — MIDAZOLAM HYDROCHLORIDE 1 MG/ML
INJECTION INTRAMUSCULAR; INTRAVENOUS
Status: DISCONTINUED | OUTPATIENT
Start: 2022-01-24 | End: 2022-01-24

## 2022-01-24 RX ORDER — LIDOCAINE HYDROCHLORIDE 10 MG/ML
INJECTION, SOLUTION EPIDURAL; INFILTRATION; INTRACAUDAL; PERINEURAL
Status: DISCONTINUED | OUTPATIENT
Start: 2022-01-24 | End: 2022-01-24 | Stop reason: HOSPADM

## 2022-01-24 RX ORDER — HYDROMORPHONE HYDROCHLORIDE 1 MG/ML
0.5 INJECTION, SOLUTION INTRAMUSCULAR; INTRAVENOUS; SUBCUTANEOUS EVERY 4 HOURS PRN
Status: DISCONTINUED | OUTPATIENT
Start: 2022-01-24 | End: 2022-01-25

## 2022-01-24 RX ORDER — SCOLOPAMINE TRANSDERMAL SYSTEM 1 MG/1
1 PATCH, EXTENDED RELEASE TRANSDERMAL
Status: DISCONTINUED | OUTPATIENT
Start: 2022-01-24 | End: 2022-01-30 | Stop reason: HOSPADM

## 2022-01-24 RX ORDER — ONDANSETRON 2 MG/ML
4 INJECTION INTRAMUSCULAR; INTRAVENOUS ONCE AS NEEDED
Status: DISCONTINUED | OUTPATIENT
Start: 2022-01-24 | End: 2022-01-25

## 2022-01-24 RX ADMIN — ACETAMINOPHEN 650 MG: 325 TABLET ORAL at 04:01

## 2022-01-24 RX ADMIN — PREGABALIN 150 MG: 75 CAPSULE ORAL at 09:01

## 2022-01-24 RX ADMIN — HYDROMORPHONE HYDROCHLORIDE 0.5 MG: 2 INJECTION, SOLUTION INTRAMUSCULAR; INTRAVENOUS; SUBCUTANEOUS at 11:01

## 2022-01-24 RX ADMIN — MEROPENEM 1 G: 1 INJECTION, POWDER, FOR SOLUTION INTRAVENOUS at 10:01

## 2022-01-24 RX ADMIN — INSULIN ASPART 5 UNITS: 100 INJECTION, SOLUTION INTRAVENOUS; SUBCUTANEOUS at 05:01

## 2022-01-24 RX ADMIN — ATORVASTATIN CALCIUM 80 MG: 40 TABLET, FILM COATED ORAL at 09:01

## 2022-01-24 RX ADMIN — FENTANYL CITRATE 50 MCG: 50 INJECTION, SOLUTION INTRAMUSCULAR; INTRAVENOUS at 10:01

## 2022-01-24 RX ADMIN — HYDROMORPHONE HYDROCHLORIDE 0.5 MG: 1 INJECTION, SOLUTION INTRAMUSCULAR; INTRAVENOUS; SUBCUTANEOUS at 05:01

## 2022-01-24 RX ADMIN — PROPOFOL 100 MCG/KG/MIN: 10 INJECTION, EMULSION INTRAVENOUS at 10:01

## 2022-01-24 RX ADMIN — FUROSEMIDE 40 MG: 40 TABLET ORAL at 12:01

## 2022-01-24 RX ADMIN — ALLOPURINOL 100 MG: 100 TABLET ORAL at 12:01

## 2022-01-24 RX ADMIN — PREGABALIN 150 MG: 75 CAPSULE ORAL at 04:01

## 2022-01-24 RX ADMIN — MEROPENEM 1 G: 1 INJECTION, POWDER, FOR SOLUTION INTRAVENOUS at 04:01

## 2022-01-24 RX ADMIN — SODIUM BICARBONATE 650 MG: 650 TABLET ORAL at 09:01

## 2022-01-24 RX ADMIN — HYDROMORPHONE HYDROCHLORIDE 1 MG: 1 INJECTION, SOLUTION INTRAMUSCULAR; INTRAVENOUS; SUBCUTANEOUS at 09:01

## 2022-01-24 RX ADMIN — APIXABAN 5 MG: 5 TABLET, FILM COATED ORAL at 09:01

## 2022-01-24 RX ADMIN — MIDAZOLAM HYDROCHLORIDE 2 MG: 1 INJECTION, SOLUTION INTRAMUSCULAR; INTRAVENOUS at 10:01

## 2022-01-24 RX ADMIN — FUROSEMIDE 40 MG: 40 TABLET ORAL at 05:01

## 2022-01-24 RX ADMIN — INSULIN ASPART 2 UNITS: 100 INJECTION, SOLUTION INTRAVENOUS; SUBCUTANEOUS at 05:01

## 2022-01-24 RX ADMIN — PREGABALIN 150 MG: 75 CAPSULE ORAL at 12:01

## 2022-01-24 RX ADMIN — INSULIN ASPART 5 UNITS: 100 INJECTION, SOLUTION INTRAVENOUS; SUBCUTANEOUS at 09:01

## 2022-01-24 RX ADMIN — MELATONIN TAB 3 MG 6 MG: 3 TAB at 09:01

## 2022-01-24 RX ADMIN — SCOPALAMINE 1 PATCH: 1 PATCH, EXTENDED RELEASE TRANSDERMAL at 12:01

## 2022-01-24 RX ADMIN — SODIUM CHLORIDE, SODIUM LACTATE, POTASSIUM CHLORIDE, AND CALCIUM CHLORIDE: .6; .31; .03; .02 INJECTION, SOLUTION INTRAVENOUS at 10:01

## 2022-01-24 RX ADMIN — ONDANSETRON 4 MG: 2 INJECTION, SOLUTION INTRAMUSCULAR; INTRAVENOUS at 10:01

## 2022-01-24 RX ADMIN — SODIUM BICARBONATE 650 MG: 650 TABLET ORAL at 12:01

## 2022-01-24 RX ADMIN — SERTRALINE HYDROCHLORIDE 50 MG: 50 TABLET ORAL at 12:01

## 2022-01-24 RX ADMIN — HYDROCODONE BITARTRATE AND ACETAMINOPHEN 1 TABLET: 5; 325 TABLET ORAL at 05:01

## 2022-01-24 RX ADMIN — LIDOCAINE HYDROCHLORIDE 50 MG: 20 INJECTION, SOLUTION INTRAVENOUS at 10:01

## 2022-01-24 RX ADMIN — HYDROCODONE BITARTRATE AND ACETAMINOPHEN 1 TABLET: 5; 325 TABLET ORAL at 01:01

## 2022-01-24 RX ADMIN — CEFAZOLIN 2 G: 330 INJECTION, POWDER, FOR SOLUTION INTRAMUSCULAR; INTRAVENOUS at 10:01

## 2022-01-24 RX ADMIN — MIRTAZAPINE 15 MG: 7.5 TABLET ORAL at 09:01

## 2022-01-24 NOTE — SUBJECTIVE & OBJECTIVE
Interval History: Patient doing well. Going for right leg BKA stump revision tomorrow. Cultures from right knee 1/17 and 1/20 positive for pseudomonas and klebsiella ESBL. Stopped vanco today.     Review of Systems   Respiratory: Negative for shortness of breath.    Gastrointestinal: Negative for diarrhea, nausea and vomiting.     Antibiotics (From admission, onward)            Start     Stop Route Frequency Ordered    01/21/22 2330  meropenem 1 g in sodium chloride 0.9 % 100 mL IVPB (ready to mix system)         -- IV Every 8 hours (non-standard times) 01/21/22 2222        Objective:     Vital Signs (Most Recent):  Temp: 98.6 °F (37 °C) (01/23/22 1952)  Pulse: 102 (01/23/22 2005)  Resp: 18 (01/23/22 2049)  BP: 128/62 (01/23/22 1952)  SpO2: 98 % (01/23/22 2005) Vital Signs (24h Range):  Temp:  [98.1 °F (36.7 °C)-98.8 °F (37.1 °C)] 98.6 °F (37 °C)  Pulse:  [] 102  Resp:  [15-18] 18  SpO2:  [95 %-100 %] 98 %  BP: (126-175)/(57-81) 128/62     Weight: 76.2 kg (167 lb 15.9 oz)  Body mass index is 27.96 kg/m².    Estimated Creatinine Clearance: 49.9 mL/min (based on SCr of 1.3 mg/dL).    Physical Exam  Cardiovascular:      Heart sounds: Normal heart sounds.   Pulmonary:      Breath sounds: Normal breath sounds.   Abdominal:      General: Bowel sounds are normal. There is no distension.      Palpations: Abdomen is soft.      Tenderness: There is no abdominal tenderness.   Musculoskeletal:      Right lower leg: No edema.      Left lower leg: No edema.      Comments: Left AKA well healed; right bka wound bandaged         Significant Labs:   Blood Culture:   Recent Labs   Lab 12/30/21  1412 01/02/22  1938 01/17/22  1548 01/19/22  0848   LABBLOO No growth after 5 days.  No growth after 5 days. No growth after 5 days.  No growth after 5 days. No growth after 5 days.  Gram stain salvatore bottle: Gram positive cocci in clusters resembling Staph   Results called to and read back by: Tita Tavarez RN 01/18/2022    19:36   Gram stain aer bottle: Gram positive cocci in clusters resembling Staph   Positive results previously called 01/19/2022  04:26  COAGULASE-NEGATIVE STAPHYLOCOCCUS SPECIES  Organism is a probable contaminant  * No Growth to date  No Growth to date  No Growth to date  No Growth to date  No Growth to date  No Growth to date  No Growth to date  No Growth to date  No Growth to date  No Growth to date     CBC:   Recent Labs   Lab 01/22/22  0335   WBC 7.40   HGB 8.1*   HCT 27.1*        CMP:   Recent Labs   Lab 01/22/22  0335      K 4.6      CO2 23   *   BUN 46*   CREATININE 1.3   CALCIUM 8.2*   ANIONGAP 8   EGFRNONAA 46*     Urine Culture:   Recent Labs   Lab 12/21/21  2204 01/03/22  1724   LABURIN No growth CANDIDA GLABRATA  > 100,000 cfu/ml  Treatment of asymptomatic candiduria is not recommended (except for   specific populations). Candida isolated in the urine typically   represents colonization. If an indwelling urinary catheter is present  it should be removed or replaced.  *     Urine Studies:   Recent Labs   Lab 01/03/22  1724   COLORU Yellow   APPEARANCEUA Cloudy*   PHUR 6.0   SPECGRAV 1.020   PROTEINUA 3+*   GLUCUA Trace*   KETONESU Negative   BILIRUBINUA Negative   OCCULTUA 2+*   NITRITE Negative   UROBILINOGEN Negative   LEUKOCYTESUR 3+*   RBCUA 12*   WBCUA >100*   BACTERIA Moderate*   SQUAMEPITHEL 14   HYALINECASTS 0     Wound Culture:   Recent Labs   Lab 10/14/21  1108 01/17/22  1627 01/20/22  1451   LABAERO KLEBSIELLA PNEUMONIAE ESBL  Moderate  * PSEUDOMONAS AERUGINOSA  Many  *  KLEBSIELLA PNEUMONIAE ESBL  Moderate  * PSEUDOMONAS AERUGINOSA  Few  *  KLEBSIELLA PNEUMONIAE ESBL  Few  *       Significant Imaging: no new images

## 2022-01-24 NOTE — SUBJECTIVE & OBJECTIVE
Interval History:  no acute events overnight, plan for debridement right BKA today  Appreciate ID recs continue meropenem pending final culture DC vancomycin      Review of Systems   Constitutional: Negative for chills, diaphoresis, fatigue and fever.   HENT: Negative for hearing loss and sore throat.    Respiratory: Negative for cough and shortness of breath.    Cardiovascular: Negative for chest pain and leg swelling.   Gastrointestinal: Negative for abdominal pain, diarrhea, nausea and vomiting.   Genitourinary: Negative for difficulty urinating and flank pain.   Musculoskeletal: Negative for back pain.   Skin: Positive for wound. Negative for rash.   Neurological: Negative for dizziness, weakness and headaches.   Psychiatric/Behavioral: Negative for agitation and confusion.     Objective:     Vital Signs (Most Recent):  Temp: 97.9 °F (36.6 °C) (01/24/22 1215)  Pulse: 94 (01/24/22 1215)  Resp: 18 (01/24/22 1215)  BP: 135/71 (01/24/22 1215)  SpO2: 97 % (01/24/22 1215) Vital Signs (24h Range):  Temp:  [97.9 °F (36.6 °C)-98.8 °F (37.1 °C)] 97.9 °F (36.6 °C)  Pulse:  [] 94  Resp:  [16-20] 18  SpO2:  [97 %-100 %] 97 %  BP: (128-164)/(58-79) 135/71     Weight: 76.2 kg (167 lb 15.9 oz)  Body mass index is 27.96 kg/m².    Intake/Output Summary (Last 24 hours) at 1/24/2022 1240  Last data filed at 1/24/2022 0601  Gross per 24 hour   Intake 893.31 ml   Output --   Net 893.31 ml      Physical Exam  Vitals and nursing note reviewed.   Constitutional:       General: She is not in acute distress.     Appearance: She is obese. She is not ill-appearing.   HENT:      Head: Normocephalic and atraumatic.   Cardiovascular:      Rate and Rhythm: Normal rate and regular rhythm.      Pulses: Normal pulses.      Heart sounds: Normal heart sounds.   Pulmonary:      Effort: Pulmonary effort is normal.   Abdominal:      General: Bowel sounds are normal.      Palpations: Abdomen is soft.   Musculoskeletal:         General: No  swelling.      Cervical back: Normal range of motion and neck supple.      Right Lower Extremity: Right leg is amputated below knee.      Left Lower Extremity: Left leg is amputated below knee.   Feet:      Right foot:      Skin integrity: No erythema or warmth.      Comments: S/p right BKA 12/28.   Wound dressing intact  Skin:     General: Skin is warm and dry.      Capillary Refill: Capillary refill takes less than 2 seconds.      Comments: unstageable sacral decubitus   Neurological:      General: No focal deficit present.      Mental Status: She is alert and oriented to person, place, and time.   Psychiatric:         Mood and Affect: Mood normal.         Behavior: Behavior normal. Behavior is cooperative.         Significant Labs: All pertinent labs within the past 24 hours have been reviewed.    Significant Imaging: I have reviewed all pertinent imaging results/findings within the past 24 hours.

## 2022-01-24 NOTE — ANESTHESIA POSTPROCEDURE EVALUATION
Anesthesia Post Evaluation    Patient: Suyapa Connelly    Procedure(s) Performed: Procedure(s) (LRB):  REVISION-STUMP (Right)    Final Anesthesia Type: MAC      Patient location during evaluation: OPS  Patient participation: Yes- Able to Participate  Level of consciousness: awake and alert and oriented  Post-procedure vital signs: reviewed and stable  Pain management: adequate  Airway patency: patent    PONV status at discharge: No PONV  Anesthetic complications: no      Cardiovascular status: blood pressure returned to baseline  Respiratory status: unassisted  Hydration status: euvolemic  Follow-up not needed.          Vitals Value Taken Time   /57 01/24/22 1222   Temp 36.6 °C (97.9 °F) 01/24/22 1215   Pulse 96 01/24/22 1222   Resp 11 01/24/22 1222   SpO2 98 % 01/24/22 1222   Vitals shown include unvalidated device data.      Event Time   Out of Recovery 01/24/2022 12:15:00         Pain/Shannan Score: Pain Rating Prior to Med Admin: 9 (1/24/2022 11:45 AM)  Pain Rating Post Med Admin: 6 (1/24/2022 12:15 PM)  Shannan Score: 9 (1/24/2022 12:15 PM)

## 2022-01-24 NOTE — TRANSFER OF CARE
"Anesthesia Transfer of Care Note    Patient: Suyapa Connelly    Procedure(s) Performed: Procedure(s) (LRB):  REVISION-STUMP (Right)    Patient location: PACU    Anesthesia Type: MAC    Transport from OR: Transported from OR on room air with adequate spontaneous ventilation    Post pain: adequate analgesia    Post assessment: no apparent anesthetic complications    Post vital signs: stable    Level of consciousness: sedated    Nausea/Vomiting: no nausea/vomiting    Complications: none    Transfer of care protocol was followed      Last vitals:   Visit Vitals  /63   Pulse 90   Temp 36.6 °C (97.9 °F)   Resp 18   Ht 5' 5" (1.651 m)   Wt 76.2 kg (167 lb 15.9 oz)   LMP 09/30/2015 (Exact Date)   SpO2 100%   BMI 27.96 kg/m²     "

## 2022-01-24 NOTE — PT/OT/SLP PROGRESS
Occupational Therapy      Patient Name:  Suyapa Connelly   MRN:  [id number]    Patient not seen today secondary to Other (Comment) (Patient scheduled for BKA residual limb revision. Will follow-up next avail date.).     1/24/2022

## 2022-01-24 NOTE — ASSESSMENT & PLAN NOTE
S/p right bka with Dr. Rojas on 12/28/21  Presents to ED with concerns for wound infection  BKA site with dehisced wound  Received zosyn x1  Vancomycin and cefazolin initiated, cultures pseudomonas.  Cefazolin changed to cefepime and Vanc.  General surgery consulted for possible debridement: check cultures , apply santyl , xeroform , 4 x 4 , kerlix , and ace bandage daily, iv antibiotics  ID consulted for recommendations on antibiotic therapy: vanc, cefepime.  Await final cultures. DC vancomycin on and   continue meropenem 1/24 pending final culture   PT/OT: rehab

## 2022-01-24 NOTE — PLAN OF CARE
Signed out from pacu per Dr Barnett. Report called to Nay Bender/5A. Transported to room 510 via bed,sr upx4

## 2022-01-24 NOTE — PLAN OF CARE
Pt AAOx4, complains of moderate stump pain. Pt is NPO after midnight for anticipated procedure. Weight shifting assistance provided every 2 hours. Blood glucose checks continued. Safety maintained, bed alarm on - will cont to monitor.

## 2022-01-24 NOTE — NURSING
Patient AAOx4. C/o pain to RLE. No c/o n/v. Medications administered per orders. R midline in place.  Turn q2hrs.BG, LABS and VS monitored. Instructed to call for assistance.  at bedside.Safety maintained. Will continue to monitor

## 2022-01-24 NOTE — PROGRESS NOTES
[facility] Surg  Infectious Disease  Progress Note    Patient Name: Suyapa Connelly  MRN: [id number]  Admission Date: 1/17/2022  Length of Stay: 3 days  Attending Physician: Jose Carlos Mar MD  Primary Care Provider: Magen Christensen MD    Isolation Status: Contact  Assessment/Plan:      * Infected wound  57 y/o female with PMHx CKD3, DMII, GERD, HLD, HTN, osteomyelitis, PVD, depression, and anxiety presents to ED with  with continue concern for wound infection of right BKA wound. BC 1/17 - 1 of 2 sets with coag negative staph - likely contaminant. Right knee wound 1/17 positive for pseudomonas and Klebsiella ESBL. Right leg wound culture positive for pseudomonas and GNR.       -await final cxs - continue meropenem   -stop vanco today  -surgery plans further debridement soon            Anticipated Disposition: await further debridement     Thank you for your consult. I will follow-up with patient. Please contact us if you have any additional questions.[phone]    Geno Sethi MD  Infectious Disease  [place] [facility] Surg    Subjective:     Principal Problem:Infected wound    HPI: 57 y/o female with PMHx CKD3, DMII, GERD, HLD, HTN, osteomyelitis, PVD, depression, and anxiety presents to ED with  with continue concern for wound infection.  Patient underwent right BKA by Dr. Rojas on 12/28 then discharged to rehab facility on 1/10.  Rehab facility staff became concerned for possible wound infection and patient was sent back to ED on 1/11.  Lab work was obtained, and images of wound were reviewed with Dr. Rojas, who recommended continued outpatient management.  At discharge patient was prescribed Augmentin, which she has continued taking as instructed.   at bedside is concern for progressively worsening appearance of wound, along with continued drainage.  Patient reporting worsened pain today described as sharp, worse with touch, radiating towards right knee, severity 8/10.  No fevers, chills,  nausea, vomiting, chest pain, cough, shortness of breath, abdominal pain, urinary or bowel complications      ID is consulted 1/19/22 for infection of BKA site with wound cxs growing presumptive pseudomonas  1/20/22 - new gram negative favio on culture in addition to the pseudomonas; continue vanco and cefepime  1/21/22 - the right knee culture from 1/17 positive for pseudomonas and Klebsiella ESBL - changed cefepime to meropenem and continued vancomycin  1/22/22 - right leg wound from 1/20/22 positive for pseudomonas and GNR  1/23/22 - right leg wound from 1/20/22 positive for pseudomonas and klebsiella ESBL; vancomycin stopped    Interval History: Patient doing well. Going for right leg BKA stump revision tomorrow. Cultures from right knee 1/17 and 1/20 positive for pseudomonas and klebsiella ESBL. Stopped vanco today.     Review of Systems   Respiratory: Negative for shortness of breath.    Gastrointestinal: Negative for diarrhea, nausea and vomiting.     Antibiotics (From admission, onward)            Start     Stop Route Frequency Ordered    01/21/22 9710  meropenem 1 g in sodium chloride 0.9 % 100 mL IVPB (ready to mix system)         -- IV Every 8 hours (non-standard times) 01/21/22 2222        Objective:     Vital Signs (Most Recent):  Temp: 98.6 °F (37 °C) (01/23/22 1952)  Pulse: 102 (01/23/22 2005)  Resp: 18 (01/23/22 2049)  BP: 128/62 (01/23/22 1952)  SpO2: 98 % (01/23/22 2005) Vital Signs (24h Range):  Temp:  [98.1 °F (36.7 °C)-98.8 °F (37.1 °C)] 98.6 °F (37 °C)  Pulse:  [] 102  Resp:  [15-18] 18  SpO2:  [95 %-100 %] 98 %  BP: (126-175)/(57-81) 128/62     Weight: 76.2 kg (167 lb 15.9 oz)  Body mass index is 27.96 kg/m².    Estimated Creatinine Clearance: 49.9 mL/min (based on SCr of 1.3 mg/dL).    Physical Exam  Cardiovascular:      Heart sounds: Normal heart sounds.   Pulmonary:      Breath sounds: Normal breath sounds.   Abdominal:      General: Bowel sounds are normal. There is no distension.       Palpations: Abdomen is soft.      Tenderness: There is no abdominal tenderness.   Musculoskeletal:      Right lower leg: No edema.      Left lower leg: No edema.      Comments: Left AKA well healed; right bka wound bandaged         Significant Labs:   Blood Culture:   Recent Labs   Lab 12/30/21  1412 01/02/22  1938 01/17/22  1548 01/19/22  0848   LABBLOO No growth after 5 days.  No growth after 5 days. No growth after 5 days.  No growth after 5 days. No growth after 5 days.  Gram stain salvatore bottle: Gram positive cocci in clusters resembling Staph   Results called to and read back by: Tita Tavarez RN 01/18/2022    19:36  Gram stain aer bottle: Gram positive cocci in clusters resembling Staph   Positive results previously called 01/19/2022  04:26  COAGULASE-NEGATIVE STAPHYLOCOCCUS SPECIES  Organism is a probable contaminant  * No Growth to date  No Growth to date  No Growth to date  No Growth to date  No Growth to date  No Growth to date  No Growth to date  No Growth to date  No Growth to date  No Growth to date     CBC:   Recent Labs   Lab 01/22/22  0335   WBC 7.40   HGB 8.1*   HCT 27.1*        CMP:   Recent Labs   Lab 01/22/22  0335      K 4.6      CO2 23   *   BUN 46*   CREATININE 1.3   CALCIUM 8.2*   ANIONGAP 8   EGFRNONAA 46*     Urine Culture:   Recent Labs   Lab 12/21/21  2204 01/03/22  1724   LABURIN No growth CANDIDA GLABRATA  > 100,000 cfu/ml  Treatment of asymptomatic candiduria is not recommended (except for   specific populations). Candida isolated in the urine typically   represents colonization. If an indwelling urinary catheter is present  it should be removed or replaced.  *     Urine Studies:   Recent Labs   Lab 01/03/22  1724   COLORU Yellow   APPEARANCEUA Cloudy*   PHUR 6.0   SPECGRAV 1.020   PROTEINUA 3+*   GLUCUA Trace*   KETONESU Negative   BILIRUBINUA Negative   OCCULTUA 2+*   NITRITE Negative   UROBILINOGEN Negative   LEUKOCYTESUR 3+*   RBCUA 12*    WBCUA >100*   BACTERIA Moderate*   SQUAMEPITHEL 14   HYALINECASTS 0     Wound Culture:   Recent Labs   Lab 10/14/21  1108 01/17/22  1627 01/20/22  1451   LABAERO KLEBSIELLA PNEUMONIAE ESBL  Moderate  * PSEUDOMONAS AERUGINOSA  Many  *  KLEBSIELLA PNEUMONIAE ESBL  Moderate  * PSEUDOMONAS AERUGINOSA  Few  *  KLEBSIELLA PNEUMONIAE ESBL  Few  *       Significant Imaging: no new images

## 2022-01-24 NOTE — ANESTHESIA PREPROCEDURE EVALUATION
01/23/2022          Patient Name: Suyapa Connelly  YOB: 1965  MRN: [id number]    SUBJECTIVE:     01/23/2022    Suyapa Connelly is a 56 y.o. female w/ a significant PMHx of HTN, HLD, CAD s/p CABG +PCI , CHF, pAFib, PVD,IDDM, CRI,  thrombosis of femoro-popliteal bypass graft (4/29) s/p L above the knee amputation by vascular surgery on 5/18 and right knee BKA 12/28/21 now with infection scheduled for stump revision     OBJECTIVE:     Vital Signs Range (Last 24H):  Temp:  [36.7 °C (98.1 °F)-37.1 °C (98.8 °F)]   Pulse:  []   Resp:  [15-18]   BP: (126-175)/(57-81)   SpO2:  [95 %-100 %]       Significant Labs:  Lab Results   Component Value Date    WBC 7.40 01/22/2022    HGB 8.1 (L) 01/22/2022    HCT 27.1 (L) 01/22/2022     01/22/2022    CHOL 266 (H) 12/03/2021    TRIG 167 (H) 12/03/2021    HDL 55 12/03/2021    ALT 13 01/18/2022    AST 14 01/18/2022     01/22/2022    K 4.6 01/22/2022     01/22/2022    CREATININE 1.3 01/22/2022    BUN 46 (H) 01/22/2022    CO2 23 01/22/2022    TSH 2.989 07/04/2021    INR 1.4 (H) 06/17/2021    HGBA1C 9.6 (H) 12/03/2021       Diagnostic Studies: No relevant studies.    EKG:   Results for orders placed or performed during the hospital encounter of 12/21/21   EKG 12-lead    Collection Time: 12/30/21 10:38 AM    Narrative    Test Reason : R94.31,    Vent. Rate : 107 BPM     Atrial Rate : 107 BPM     P-R Int : 128 ms          QRS Dur : 082 ms      QT Int : 342 ms       P-R-T Axes : 033 025 089 degrees     QTc Int : 456 ms    Sinus tachycardia with Premature atrial complexes  Nonspecific T wave abnormality  Abnormal ECG  When compared with ECG of 26-DEC-2021 17:45,  Premature atrial complexes are now Present  Confirmed by Martin HAYDEN, Liseth ([id number]) on 1/2/2022 4:36:24 PM    Referred By: NESHA   SELF           Confirmed By:Liseth  "Martin HAYDEN       2D ECHO:  TTE:  Results for orders placed or performed during the hospital encounter of 06/17/21   Echo Saline Bubble? No   Result Value Ref Range    BSA 2.07 m2    TDI SEPTAL 0.10 m/s    LV LATERAL E/E' RATIO 7.31 m/s    LV SEPTAL E/E' RATIO 9.50 m/s    LA WIDTH 3.40 cm    TDI LATERAL 0.13 m/s    LVIDd 4.63 3.5 - 6.0 cm    IVS 0.92 0.6 - 1.1 cm    Posterior Wall 0.91 0.6 - 1.1 cm    LVIDs 3.47 2.1 - 4.0 cm    FS 25 28 - 44 %    LA volume 38.97 cm3    Sinus 2.64 cm    STJ 2.45 cm    Ascending aorta 3.13 cm    LV mass 142.32 g    LA size 3.42 cm    RVDD 3.34 cm    TAPSE 1.04 cm    RV S' 10.81 cm/s    Left Ventricle Relative Wall Thickness 0.39 cm    AV mean gradient 5 mmHg    AV valve area 2.05 cm2    AV Velocity Ratio 0.57     AV index (prosthetic) 0.72     MV valve area p 1/2 method 8.34 cm2    E/A ratio 1.76     Mean e' 0.12 m/s    E wave deceleration time 90.93 msec    MV "A" wave duration 6.57 msec    Pulm vein S/D ratio 1.12     LVOT diameter 1.91 cm    LVOT area 2.9 cm2    LVOT peak ty 0.87 m/s    LVOT peak VTI 19.98 cm    Ao peak ty 1.53 m/s    Ao VTI 27.88 cm    LVOT stroke volume 57.22 cm3    AV peak gradient 9 mmHg    E/E' ratio 8.26 m/s    MV Peak E Ty 0.95 m/s    TR Max Ty 2.63 m/s    MV stenosis pressure 1/2 time 26.37 ms    MV Peak A Ty 0.54 m/s    PV Peak S Ty 0.48 m/s    PV Peak D Ty 0.43 m/s    LV Systolic Volume 49.74 mL    LV Systolic Volume Index 24.9 mL/m2    LV Diastolic Volume 99.06 mL    LV Diastolic Volume Index 49.53 mL/m2    LA Volume Index 19.5 mL/m2    LV Mass Index 71 g/m2    RA Major Axis 4.25 cm    Left Atrium Minor Axis 3.85 cm    Left Atrium Major Axis 4.04 cm    Triscuspid Valve Regurgitation Peak Gradient 28 mmHg    LA Volume Index (Mod) 13.4 mL/m2    LA volume (mod) 26.75 cm3    RA Width 3.44 cm    Right Atrial Pressure (from IVC) 3 mmHg    EF 55 %    TV rest pulmonary artery pressure 31 mmHg    Narrative    · The estimated ejection fraction is 55%.  · " The left ventricle is normal in size with normal systolic function.  · Normal left ventricular diastolic function.  · Normal right ventricular size with normal right ventricular systolic   function.  · Mild tricuspid regurgitation.  · The estimated PA systolic pressure is 31 mmHg.  · Normal central venous pressure (3 mmHg).          KATE:  No results found for this or any previous visit.    ASSESSMENT/PLAN:         Anesthesia Evaluation    I have reviewed the NPO Status.   I have reviewed the Medications.     Review of Systems  Anesthesia Hx:  Hx of Anesthetic complications PONV History of prior surgery of interest to airway management or planning: Previous anesthesia: General Denies Family Hx of Anesthesia complications.  Denies Personal Hx of Anesthesia complications.   Social:  Former Smoker    Hematology/Oncology:         -- Anemia:   Cardiovascular:   Hypertension Past MI CAD  CABG/stent  hyperlipidemia Bailee mets, no chest pain with wheelchair activity or at rest endorsed   Pulmonary:  Pulmonary Normal    Renal/:   Chronic Renal Disease (ROSLYN on CKD), CRI, ARF    Hepatic/GI:   GERD    Neurological:  Neurology Normal AMS   Endocrine:   Diabetes, poorly controlled, type 2    Psych:   Psychiatric History anxiety          Physical Exam  General:  Well nourished    Airway/Jaw/Neck:  Airway Findings: Mouth Opening: Normal Tongue: Normal  General Airway Assessment: Adult  Mallampati: II  TM Distance: Normal, at least 6 cm  Jaw/Neck Findings:  Neck ROM: Normal ROM      Dental:  Dental Findings: In tact, Periodontal disease, Severe        Mental Status:  Mental Status Findings:  Confusion, Anxious         Anesthesia Plan  Type of Anesthesia, risks & benefits discussed:  Anesthesia Type:  general, regional, MAC    Patient's Preference:   Plan Factors:          Intra-op Monitoring Plan: standard ASA monitors  Intra-op Monitoring Plan Comments:   Post Op Pain Control Plan: IV/PO Opioids PRN and multimodal analgesia  Post Op  Pain Control Plan Comments:     Induction:   IV  Beta Blocker:  Patient is on a Beta-Blocker and has received one dose within the past 24 hours (No further documentation required).       Informed Consent:    ASA Score: 4     Day of Surgery Review of History & Physical:        Anesthesia Plan Notes: Needs updated H&P and consent prior to surgery         Ready For Surgery From Anesthesia Perspective.

## 2022-01-24 NOTE — PROGRESS NOTES
Wound care follow up:  Attempt for wound assessment of L buttocks wound however pt had just returned from surgery- spoke with nurse Inez who reports she just cleaned up and changed patient, applying Triad ointment over L buttocks wound and appropriately added Triad ointment to perineum for moisture skin breakdown.   Spouse at bedside stating he was present during care Inez provided and stated he was pleased with her care and adding Triad ointment to perineum.  Spouse requesting assessment of R BKA.  Explained to him that removal of the dressing applied in surgery today would not be appropriate at this time due to interruption of hemostasis. He verbalized understanding. Agreed to assessment of buttocks tomorrow.   Discussed wound care for R BKA with Dr. Rojas who provided wound care orders to d/c santyl and beginning tomorrow to have nursing apply xeroform dressing and abd pad to incision, securing with kerlix and lite ace.

## 2022-01-24 NOTE — NURSING
Mrs. Dale's  stated he observed blood on patient sacral area and requested charge nurse to check. Patient sacral area was inspected. No blood present. She has a healead stage 2 sacral wound and an approximately 1/2 by 1/2 inch dry blister over the upper sacrococcygeal area with no blood or any other drain. Patient stated normal sensibility on the sacral area to report for incontinent episode and being able to turn by herself to the left and ride. Fely, nurse assigned to patient care, and house supervisor Pushpa present in the room. Patient has being instructed to turn every 2 hours, call for assistance, and report any changes. Pt. Verbalized understanding.

## 2022-01-24 NOTE — PLAN OF CARE
chart reviewed - cx are pending   TN communicated with [facility] per Secure Chat to ascertain that pt is being evaluated for admission.         01/24/22 1643   Post-Acute Status   Post-Acute Authorization Placement   Post-Acute Placement Status Referrals Sent

## 2022-01-24 NOTE — OP NOTE
[facility] [facility] Surg  Brief Operative Note    SUMMARY     Surgery Date: 1/24/2022     Surgeon(s) and Role:     * Kaitlyn Rojas MD - Primary    Assisting Surgeon: None    Pre-op Diagnosis:  S/P BKA (below knee amputation) unilateral, right [Z89.511]    Post-op Diagnosis:  Post-Op Diagnosis Codes:     * S/P BKA (below knee amputation) unilateral, right [Z89.511]    Procedure(s) (LRB):  REVISION-STUMP (Right)  Below knee Amputation  Anesthesia: General/MAC    Operative Findings:  A revision amputation stump right below-knee done under MAC anesthesia patient tolerated well no intraop complication estimated blood loss 50 cc specimen removed skin subcutaneous tissue muscle fascia and bone patient tolerated well sent to recovery room in stable condition.  Procedure after was satisfactory IV sedation patient in supine position right leg knee and BK amputation stump was prepped and draped in a normal sterile manner using ChloraPrep time-out was called site was confirmed area of the stump which was infected was infiltrated using 1% xylocaine solution elliptical incision was made completed excising the infected area almost the whole length of the incision was taken down deep subcu tissue subcutaneous bleeders were clamped bovied infected subcutaneous tissue muscle fascia was then excised bleeders were clamped bovied the stump of fibula and tibia was exposed with the help of electric saw the the tips were removed wound was thoroughly irrigated antibiotic solution hemostasis was maintained wound was then approximated using interrupted 0 Vicryl for the fascia skin was closed using interrupted 2 nylon suture sterile gauze dressings applied instrument counts sponge count counts correct at dilated well estimated blood loss was 50 cc specimen removed was into the skin subcutaneous tissue muscle fascia and bone sent to recovery room in stable condition.  Estimated Blood Loss: 50 cc    Estimated Blood Loss has been documented.          Specimens:   Specimen (24h ago, onward)            None          [id number]

## 2022-01-24 NOTE — ASSESSMENT & PLAN NOTE
55 y/o female with PMHx CKD3, DMII, GERD, HLD, HTN, osteomyelitis, PVD, depression, and anxiety presents to ED with  with continue concern for wound infection of right BKA wound. BC 1/17 - 1 of 2 sets with coag negative staph - likely contaminant. Right knee wound 1/17 positive for pseudomonas and Klebsiella ESBL. Right leg wound culture positive for pseudomonas and GNR.       -await final cxs - continue meropenem   -stop vanco today  -surgery plans further debridement soon

## 2022-01-24 NOTE — PROGRESS NOTES
[facility] Medicine  Progress Note    Patient Name: Suyapa Connelly  MRN: [id number]  Patient Class: IP- Inpatient   Admission Date: 1/17/2022  Length of Stay: 4 days  Attending Physician: Tawnya Montes*  Primary Care Provider: Magen Christensen MD        Subjective:     Principal Problem:Infected wound        HPI:  Suyapa Connelly is a 57 y/o female with PMHx CKD3, DMII, GERD, HLD, HTN, osteomyelitis, PVD, depression, and anxiety presents to ED with  with continue concern for wound infection.  Patient underwent right BKA by Dr. Rojas on 12/28 then discharged to rehab facility on 1/10.  Rehab facility staff became concerned for possible wound infection and patient was sent back to ED on 1/11.  Lab work was obtained, and images of wound were reviewed with Dr. Rojas, who recommended continued outpatient management.  At discharge patient was prescribed Augmentin, which she has continued taking as instructed.   at bedside is concern for progressively worsening appearance of wound, along with continued drainage.  Patient reporting worsened pain today described as sharp, worse with touch, radiating towards right knee, severity 8/10.  No fevers, chills, nausea, vomiting, chest pain, cough, shortness of breath, abdominal pain, urinary or bowel complications.  She does admit to not taking her BP medication today.  No other acute complaints at this time. Will admit to hospital medicine for observation services. General surgery consulted.       Overview/Hospital Course:  Pt placed in observation for evaluation of wound infection.  Pt s/p R BKA with Dr. Rojas on 12/28/21.  Vanc and cefazolin initiated.  Wound culture presumptive pseudomonas species cefazolin discontinued and changed to cefepime.  UC candida and BC 1 of 2 bottles coag-neg staph suspected contaminant.  Gen Surg consulted and recommended santyl, xeroform, 4 x 4, kerlix and ace bandage daily.  Wound culture pseudomonas  sensitive to cefepime, GNR with I&S pending.    PT/OT recommended rehab facility.  WC consulted and unstageable sacral decub.  Recommended waffle overlay and triad ointment to L buttocks BID and prn incontinent episode.      Interval History:  no acute events overnight, plan for debridement right BKA today  Appreciate ID recs continue meropenem pending final culture DC vancomycin      Review of Systems   Constitutional: Negative for chills, diaphoresis, fatigue and fever.   HENT: Negative for hearing loss and sore throat.    Respiratory: Negative for cough and shortness of breath.    Cardiovascular: Negative for chest pain and leg swelling.   Gastrointestinal: Negative for abdominal pain, diarrhea, nausea and vomiting.   Genitourinary: Negative for difficulty urinating and flank pain.   Musculoskeletal: Negative for back pain.   Skin: Positive for wound. Negative for rash.   Neurological: Negative for dizziness, weakness and headaches.   Psychiatric/Behavioral: Negative for agitation and confusion.     Objective:     Vital Signs (Most Recent):  Temp: 97.9 °F (36.6 °C) (01/24/22 1215)  Pulse: 94 (01/24/22 1215)  Resp: 18 (01/24/22 1215)  BP: 135/71 (01/24/22 1215)  SpO2: 97 % (01/24/22 1215) Vital Signs (24h Range):  Temp:  [97.9 °F (36.6 °C)-98.8 °F (37.1 °C)] 97.9 °F (36.6 °C)  Pulse:  [] 94  Resp:  [16-20] 18  SpO2:  [97 %-100 %] 97 %  BP: (128-164)/(58-79) 135/71     Weight: 76.2 kg (167 lb 15.9 oz)  Body mass index is 27.96 kg/m².    Intake/Output Summary (Last 24 hours) at 1/24/2022 1240  Last data filed at 1/24/2022 0601  Gross per 24 hour   Intake 893.31 ml   Output --   Net 893.31 ml      Physical Exam  Vitals and nursing note reviewed.   Constitutional:       General: She is not in acute distress.     Appearance: She is obese. She is not ill-appearing.   HENT:      Head: Normocephalic and atraumatic.   Cardiovascular:      Rate and Rhythm: Normal rate and regular rhythm.      Pulses: Normal pulses.       Heart sounds: Normal heart sounds.   Pulmonary:      Effort: Pulmonary effort is normal.   Abdominal:      General: Bowel sounds are normal.      Palpations: Abdomen is soft.   Musculoskeletal:         General: No swelling.      Cervical back: Normal range of motion and neck supple.      Right Lower Extremity: Right leg is amputated below knee.      Left Lower Extremity: Left leg is amputated below knee.   Feet:      Right foot:      Skin integrity: No erythema or warmth.      Comments: S/p right BKA 12/28.   Wound dressing intact  Skin:     General: Skin is warm and dry.      Capillary Refill: Capillary refill takes less than 2 seconds.      Comments: unstageable sacral decubitus   Neurological:      General: No focal deficit present.      Mental Status: She is alert and oriented to person, place, and time.   Psychiatric:         Mood and Affect: Mood normal.         Behavior: Behavior normal. Behavior is cooperative.         Significant Labs: All pertinent labs within the past 24 hours have been reviewed.    Significant Imaging: I have reviewed all pertinent imaging results/findings within the past 24 hours.      Assessment/Plan:      * Infected wound  S/p right bka with Dr. Rojas on 12/28/21  Presents to ED with concerns for wound infection  BKA site with dehisced wound  Received zosyn x1  Vancomycin and cefazolin initiated, cultures pseudomonas.  Cefazolin changed to cefepime and Vanc.  General surgery consulted for possible debridement: check cultures , apply santyl , xeroform , 4 x 4 , kerlix , and ace bandage daily, iv antibiotics  ID consulted for recommendations on antibiotic therapy: vanc, cefepime.  Await final cultures. DC vancomycin on and   continue meropenem 1/24 pending final culture   PT/OT: rehab      Decubitus ulcer of sacral region, unstageable  Wound Care assistance appreciated  Recommended waffle overlay and triad ointment to L buttocks BID and prn incontinent episode.    S/P BKA (below  knee amputation) unilateral, right  See above      Phantom limb syndrome with pain  Resume home lyrica  Norco q4hr prn     Chronic anemia  Stable and at baseline      Type 2 diabetes mellitus with diabetic nephropathy, with long-term current use of insulin  Hemoglobin A1C   Date Value Ref Range Status   12/03/2021 9.6 (H) 4.0 - 5.6 % Final     Comment:     ADA Screening Guidelines:  5.7-6.4%  Consistent with prediabetes  >or=6.5%  Consistent with diabetes    High levels of fetal hemoglobin interfere with the HbA1C  assay. Heterozygous hemoglobin variants (HbS, HgC, etc)do  not significantly interfere with this assay.   However, presence of multiple variants may affect accuracy.                                       Patient's FSGs are uncontrolled due to hyperglycemia on current medication regimen.  - home regimen includes detemir, aspart  - hold home oral medications  - increased scheduled doses: detemir 10U qhs, aspart 2U tid plus scale will increase to 5U tid. Controlled.  - LDSSI with ACCUcheck ACHS  - Diabetic diet  -Hypoglycemia protocol PRN        Paroxysmal atrial fibrillation  Patient with paroxymal afib  Resumed home OAC, rate controlled  Monitor on telemetry          Mixed hyperlipidemia  Last LDL was   Lab Results   Component Value Date    LDLCALC 177.6 (H) 12/03/2021      Patient is chronically on statin.will continue for now.             MDD (major depressive disorder), recurrent episode, moderate  Resumed zoloft, stable      Peripheral vascular disease  Resume statin         VTE Risk Mitigation (From admission, onward)         Ordered     apixaban tablet 5 mg  2 times daily         01/17/22 1920     IP VTE HIGH RISK PATIENT  Once         01/17/22 1658     Place sequential compression device  Until discontinued         01/17/22 1658                Discharge Planning   DEVAN:      Code Status: Full Code   Is the patient medically ready for discharge?:     Reason for patient still in hospital (select all  that apply): Patient trending condition  Discharge Plan A: Rehab            Tawnya N MD Simon  Department of [facility] Medicine   [facility] Surg

## 2022-01-24 NOTE — PROGRESS NOTES
KEISHAW contacted pt via room phone to discuss resources, discharge, and additional social barriers to care. Per pt, pt  has [employer] OTC information, pt will look at packet when she gets home. Pt did not identify any additional social barriers to care at this time. Per pt, pt is not in need of resources at this time.    The following were addressed during this visit:  - Complete follow-up visit with patient    JENNIFER Lazo

## 2022-01-24 NOTE — PHYSICIAN QUERY
PT Name: Suyapa Connelly  MR #: [id number]     DOCUMENTATION CLARIFICATION   Amira Nicholas RN, CCDS    [email or web address]    Documentation Excellence  This form is a permanent document in the medical record.     Query Date: January 24, 2022  By submitting this query, we are merely seeking further clarification of documentation.    Please utilize your independent clinical judgment when addressing the question(s) below.    The Medical Record contains the following:   Indicators   Supporting Clinical Findings Location in Medical Record    Non-blanchable erythema/redness     x Ulcer/Injury/Skin Breakdown Superfical wound noted to sacrum, uncomplicated   Currently, no drainage, appearing to heal well.     unstageable sacral decubitus  Ed md      Hosp PN 1/18-24    Deep Tissue Injury     x Wound care   consult L upper buttocks- unstageable pressure injury- present on admit- full thickness- slough- pink edges- scant amount of serosanguinous drainage- no odor     Wound Care CN  1/18    x Acute/Chronic Illness PMHx   CKD3, DMII, GERD, HLD, HTN, osteomyelitis, PVD, depression, and anxiety   L BKA; R BKA   H&P   x Medication  /Treatment Recommendations discussed with pt, nurse and MD team:  - Maintain pressure injury prevention interventions to include waffle overlay  - Triad ointment to L buttocks BID and prn incontinent episode Wound Care CN 1/18    Other       The clinical guidelines noted are only a system guideline. It does not replace the providers clinical judgment.    Per the National Pressure Injury Advisory Panel:   A pressure injury is localized damage to the skin and underlying soft tissue usually over a bony prominence or related to a medical or other device. The injury can present as intact skin or an open ulcer and may be painful. The injury occurs as a result of intense and/or prolonged pressure or pressure in combination with shear. The tolerance of soft tissue for pressure and shear may also be affected  by microclimate, nutrition, perfusion, co-morbidities and condition of the soft tissue.       Stage 1 Pressure Injury:  Intact skin with a localized area of non-blanchable erythema, which may appear differently in darkly pigmented skin. Color changes do not include purple or maroon discoloration; these may indicate deep tissue pressure injury.    Stage 2 Pressure Injury:  Partial-thickness loss of skin with exposed dermis. The wound bed is viable, pink or red, moist, and may also present as an intact or ruptured serum-filled blister.    Stage 3 Pressure Injury:  Full-thickness loss of skin, in which adipose (fat) is visible in the ulcer and granulation tissue and epibole (rolled wound edges) are often present. Slough and/or eschar may be visible. Undermining and tunneling may occur.    Stage 4 Pressure Injury:  Full-thickness skin and tissue loss with exposed or directly palpable fascia, muscle, tendon, ligament, cartilage or bone in the ulcer. Slough and/or eschar may be visible. Epibole (rolled edges), undermining and/or tunneling often occur.    Unstageable Pressure Injury:  Full-thickness skin and tissue loss in which the extent of tissue damage within the ulcer cannot be confirmed because it is obscured by slough or eschar. If slough or eschar is removed, a Stage 3 or Stage 4 pressure injury will be revealed.    Deep Tissue Pressure Injury:  Intact or non-intact skin with localized area of persistent non-blanchable deep red, maroon, purple discoloration or epidermal separation revealing a dark wound bed or blood filled blister. This injury results from intense and/or prolonged pressure and shear forces at the bone-muscle interface. The wound may evolve rapidly to reveal the actual extent of tissue injury, or may resolve without tissue loss. If necrotic tissue, subcutaneous tissue, granulation tissue, fascia, muscle or other underlying structures are visible, this indicates a full thickness pressure injury  "(Unstageable, Stage 3 or Stage 4). Do not use DTPI to describe vascular, traumatic, neuropathic, or dermatologic conditions.     Provider, please provide the integumentary diagnosis related to the documentation of                        "Unstageable pressure injury" - specify location & Present on admit status.      [   ] Pressure Injury/Decubitus Ulcer, Unstageable         Sacral Area  & Present on admit      [ x  ] Pressure Injury/Decubitus Ulcer, Unstageable        L buttock area  & Present on admit      [   ] Pressure Injury/Decubitus Ulcer, Unstageable    - specify location & laterality: _________________    - Specify Present on admit status:    [   ] Yes (Y)                          [   ] No (N)                             [   ] Documentation insufficient to determine if condition is POA (U)  [  ] Clinically Undetermined (W)     [   ] Other Integumentary Diagnosis (please specify):______________   [  ] Clinically Undetermined     Please document in your progress notes daily for the duration of treatment until resolved and include in your discharge summary.    Reference:  EMILY Vega., CALVIN Manjarrez., Goldberg, M., EDGAR Kirkland., EDGAR Arias., & CONCEPCIÓN Wagner. (2016). Revised National Pressure Ulcer Advisory Panel Pressure Injury Staging System: Revised Pressure Injury Staging System. J Wound Ostomy Continence Nurs, 43(6), 585-597. doi:10.1097/won.[id number]  Form No.22065  "

## 2022-01-25 LAB
BACTERIA SPEC ANAEROBE CULT: NORMAL
POCT GLUCOSE: 121 MG/DL (ref 70–110)
POCT GLUCOSE: 145 MG/DL (ref 70–110)
POCT GLUCOSE: 200 MG/DL (ref 70–110)
POCT GLUCOSE: 99 MG/DL (ref 70–110)

## 2022-01-25 PROCEDURE — 63600175 PHARM REV CODE 636 W HCPCS: Mod: HCNC | Performed by: SURGERY

## 2022-01-25 PROCEDURE — 25000003 PHARM REV CODE 250: Mod: HCNC | Performed by: SURGERY

## 2022-01-25 PROCEDURE — 25000003 PHARM REV CODE 250: Mod: HCNC

## 2022-01-25 PROCEDURE — 25000003 PHARM REV CODE 250: Mod: HCNC | Performed by: FAMILY MEDICINE

## 2022-01-25 PROCEDURE — 97535 SELF CARE MNGMENT TRAINING: CPT | Mod: HCNC

## 2022-01-25 PROCEDURE — 27000207 HC ISOLATION: Mod: HCNC

## 2022-01-25 PROCEDURE — 63600175 PHARM REV CODE 636 W HCPCS: Mod: HCNC | Performed by: FAMILY MEDICINE

## 2022-01-25 PROCEDURE — 11000001 HC ACUTE MED/SURG PRIVATE ROOM: Mod: HCNC

## 2022-01-25 PROCEDURE — 97164 PT RE-EVAL EST PLAN CARE: CPT | Mod: HCNC

## 2022-01-25 PROCEDURE — 97168 OT RE-EVAL EST PLAN CARE: CPT | Mod: HCNC

## 2022-01-25 PROCEDURE — 94761 N-INVAS EAR/PLS OXIMETRY MLT: CPT | Mod: HCNC

## 2022-01-25 PROCEDURE — 97530 THERAPEUTIC ACTIVITIES: CPT | Mod: HCNC

## 2022-01-25 RX ORDER — HYDROMORPHONE HYDROCHLORIDE 1 MG/ML
0.5 INJECTION, SOLUTION INTRAMUSCULAR; INTRAVENOUS; SUBCUTANEOUS EVERY 8 HOURS PRN
Status: DISCONTINUED | OUTPATIENT
Start: 2022-01-25 | End: 2022-01-30 | Stop reason: HOSPADM

## 2022-01-25 RX ORDER — OXYCODONE AND ACETAMINOPHEN 10; 325 MG/1; MG/1
1 TABLET ORAL EVERY 6 HOURS PRN
Status: DISCONTINUED | OUTPATIENT
Start: 2022-01-25 | End: 2022-01-30 | Stop reason: HOSPADM

## 2022-01-25 RX ADMIN — MIRTAZAPINE 15 MG: 7.5 TABLET ORAL at 10:01

## 2022-01-25 RX ADMIN — PREGABALIN 150 MG: 75 CAPSULE ORAL at 02:01

## 2022-01-25 RX ADMIN — HYDROMORPHONE HYDROCHLORIDE 0.5 MG: 1 INJECTION, SOLUTION INTRAMUSCULAR; INTRAVENOUS; SUBCUTANEOUS at 08:01

## 2022-01-25 RX ADMIN — OXYCODONE AND ACETAMINOPHEN 1 TABLET: 10; 325 TABLET ORAL at 03:01

## 2022-01-25 RX ADMIN — ATORVASTATIN CALCIUM 80 MG: 40 TABLET, FILM COATED ORAL at 10:01

## 2022-01-25 RX ADMIN — SODIUM BICARBONATE 650 MG: 650 TABLET ORAL at 08:01

## 2022-01-25 RX ADMIN — FUROSEMIDE 40 MG: 40 TABLET ORAL at 05:01

## 2022-01-25 RX ADMIN — SODIUM CHLORIDE 5 ML/HR: 0.9 INJECTION, SOLUTION INTRAVENOUS at 02:01

## 2022-01-25 RX ADMIN — MEROPENEM 1 G: 1 INJECTION, POWDER, FOR SOLUTION INTRAVENOUS at 06:01

## 2022-01-25 RX ADMIN — CLOPIDOGREL 75 MG: 75 TABLET, FILM COATED ORAL at 08:01

## 2022-01-25 RX ADMIN — PREGABALIN 150 MG: 75 CAPSULE ORAL at 10:01

## 2022-01-25 RX ADMIN — APIXABAN 5 MG: 5 TABLET, FILM COATED ORAL at 08:01

## 2022-01-25 RX ADMIN — PREGABALIN 150 MG: 75 CAPSULE ORAL at 08:01

## 2022-01-25 RX ADMIN — APIXABAN 5 MG: 5 TABLET, FILM COATED ORAL at 10:01

## 2022-01-25 RX ADMIN — INSULIN ASPART 5 UNITS: 100 INJECTION, SOLUTION INTRAVENOUS; SUBCUTANEOUS at 05:01

## 2022-01-25 RX ADMIN — SERTRALINE HYDROCHLORIDE 50 MG: 50 TABLET ORAL at 08:01

## 2022-01-25 RX ADMIN — MEROPENEM 1 G: 1 INJECTION, POWDER, FOR SOLUTION INTRAVENOUS at 02:01

## 2022-01-25 RX ADMIN — FUROSEMIDE 40 MG: 40 TABLET ORAL at 08:01

## 2022-01-25 RX ADMIN — INSULIN ASPART 5 UNITS: 100 INJECTION, SOLUTION INTRAVENOUS; SUBCUTANEOUS at 12:01

## 2022-01-25 RX ADMIN — HYDROMORPHONE HYDROCHLORIDE 0.5 MG: 1 INJECTION, SOLUTION INTRAMUSCULAR; INTRAVENOUS; SUBCUTANEOUS at 12:01

## 2022-01-25 RX ADMIN — ALLOPURINOL 100 MG: 100 TABLET ORAL at 08:01

## 2022-01-25 RX ADMIN — SODIUM BICARBONATE 650 MG: 650 TABLET ORAL at 10:01

## 2022-01-25 RX ADMIN — OXYCODONE AND ACETAMINOPHEN 1 TABLET: 10; 325 TABLET ORAL at 10:01

## 2022-01-25 RX ADMIN — INSULIN ASPART 5 UNITS: 100 INJECTION, SOLUTION INTRAVENOUS; SUBCUTANEOUS at 08:01

## 2022-01-25 NOTE — SUBJECTIVE & OBJECTIVE
Interval History:  Awake and alert, acute events overnight, s/p debridement right BKA 1/24  Appreciate ID recs continue meropenem pending final culture DC vancomycin per surgery  Wound mild drainage continue daily dressing change with xeroform , 4 x 4 Kerlix, Ace bandage    Review of Systems   Constitutional: Negative for chills, diaphoresis, fatigue and fever.   HENT: Negative for hearing loss and sore throat.    Respiratory: Negative for cough and shortness of breath.    Cardiovascular: Negative for chest pain and leg swelling.   Gastrointestinal: Negative for abdominal pain, diarrhea, nausea and vomiting.   Genitourinary: Negative for difficulty urinating and flank pain.   Musculoskeletal: Negative for back pain.   Skin: Positive for wound. Negative for rash.   Neurological: Negative for dizziness, weakness and headaches.   Psychiatric/Behavioral: Negative for agitation and confusion.     Objective:     Vital Signs (Most Recent):  Temp: 98.9 °F (37.2 °C) (01/25/22 1208)  Pulse: 104 (01/25/22 1208)  Resp: 20 (01/25/22 1208)  BP: 123/76 (01/25/22 1208)  SpO2: 99 % (01/25/22 1208) Vital Signs (24h Range):  Temp:  [97.8 °F (36.6 °C)-98.9 °F (37.2 °C)] 98.9 °F (37.2 °C)  Pulse:  [104-112] 104  Resp:  [16-20] 20  SpO2:  [97 %-100 %] 99 %  BP: (109-149)/(46-80) 123/76     Weight: 76.2 kg (167 lb 15.9 oz)  Body mass index is 27.96 kg/m².    Intake/Output Summary (Last 24 hours) at 1/25/2022 1453  Last data filed at 1/25/2022 1300  Gross per 24 hour   Intake 357 ml   Output 1 ml   Net 356 ml      Physical Exam  Vitals and nursing note reviewed.   Constitutional:       General: She is not in acute distress.     Appearance: She is obese. She is not ill-appearing.   HENT:      Head: Normocephalic and atraumatic.   Cardiovascular:      Rate and Rhythm: Normal rate and regular rhythm.      Pulses: Normal pulses.      Heart sounds: Normal heart sounds.   Pulmonary:      Effort: Pulmonary effort is normal.   Abdominal:       General: Bowel sounds are normal.      Palpations: Abdomen is soft.   Musculoskeletal:         General: No swelling.      Cervical back: Normal range of motion and neck supple.      Right Lower Extremity: Right leg is amputated below knee.      Left Lower Extremity: Left leg is amputated below knee.   Feet:      Right foot:      Skin integrity: No erythema or warmth.      Comments: S/p right BKA 12/28.   Wound dressing intact  Skin:     General: Skin is warm and dry.      Capillary Refill: Capillary refill takes less than 2 seconds.   Neurological:      General: No focal deficit present.      Mental Status: She is alert and oriented to person, place, and time.   Psychiatric:         Mood and Affect: Mood normal.         Behavior: Behavior normal. Behavior is cooperative.         Significant Labs: All pertinent labs within the past 24 hours have been reviewed.    Significant Imaging: I have reviewed all pertinent imaging results/findings within the past 24 hours.

## 2022-01-25 NOTE — ASSESSMENT & PLAN NOTE
55 y/o female with PMHx CKD3, DMII, GERD, HLD, HTN, osteomyelitis, PVD, depression, and anxiety presents to ED with  with continue concern for wound infection of right BKA wound. BC 1/17 - 1 of 2 sets with coag negative staph - likely contaminant. Right knee wound 1/17 positive for pseudomonas and Klebsiella ESBL. Right leg wound culture 1/20 positive for pseudomonas and Klebsiella ESBL      -await final cxs - continue meropenem   -surgery revised right stump 1/24/22 but no new cultures obtained - will check with surgery about duration of antibitoics

## 2022-01-25 NOTE — ASSESSMENT & PLAN NOTE
S/p right bka with Dr. Rojas on 12/28/21  Presents to ED with concerns for wound infection  BKA site with dehisced wound  Received zosyn x1  Vancomycin and cefazolin initiated, cultures pseudomonas.  Cefazolin changed to cefepime and Vanc.  General surgery consulted for possible debridement: check cultures , apply santyl , xeroform , 4 x 4 , kerlix , and ace bandage daily, iv antibiotics  ID consulted for recommendations on antibiotic therapy: vanc, cefepime.  Await final cultures. DC vancomycin on 1/24 and continue meropenem 1/24 pending final culture   PT/OT: rehab

## 2022-01-25 NOTE — PLAN OF CARE
Problem: Occupational Therapy Goal  Goal: Occupational Therapy Goal  Description: Goals to be met by: 2/18     Patient will increase functional independence with ADLs by performing:    UE Dressing with Stand-by Assistance.  LE Dressing with Minimal Assistance.  Grooming while seated with Modified [place].  Toilet transfer to bedside commode with Moderate Assistance.  Upper extremity exercise program x10 reps per handout, with independence.    Outcome: Ongoing, Progressing   OT re-eval completed 2/2 R BKA revision 1/24/2022. OT goals remain appropriate, continue with POC.  IPR recommended.

## 2022-01-25 NOTE — PROGRESS NOTES
Wound care follow up:    L upper buttocks- shallow stage 3 pressure injury- present on admit- healing with increased granulation tissue 3x2.5x0.1cm- recommend to continue with Triad ointment BID and prn incontinent episode      Bilateral gluteal folds- healing partial thickness skin breakdown related to moisture- recommend to continue with Triad ointment BID and prn incontinent episode.        R BKA- s/p day #1 of surgical debridement by Dr. Rojas- sutured incision- oozing blood with strike through drainage- removed several layers of dressings- 8 abd pads, 4 kerlix and 5 ace wraps- notified Dr. Rojas of assessment and oozing of blood- Dr. Rojas provided verbal telephone orders to apply xeroform, gauze, kerlix and ace. Dressing applied and notified nursing.

## 2022-01-25 NOTE — PLAN OF CARE
Pt received all medications per MAR. Pt wound care performed. Dressing monitored. Pt blood glucose monitored. Pt turned q2. Pt vitals are stable and safety maintained. Will continue to monitor.

## 2022-01-25 NOTE — PROGRESS NOTES
"Surgery follow up  /76   Pulse 104   Temp 98.9 °F (37.2 °C)   Resp 20   Ht 5' 5" (1.651 m)   Wt 76.2 kg (167 lb 15.9 oz)   LMP 09/30/2015 (Exact Date)   SpO2 99%   BMI 27.96 kg/m²   I/O last 3 completed shifts:  In: 747.8 [I.V.:597; IV Piggyback:150.8]  Out: 1 [Urine:1]  No intake/output data recorded.    Wound mild drainage continue pena dressing chnages with xeroform , 4 x 4 Kerlix ,  Ace bandage.  "

## 2022-01-25 NOTE — NURSING
Drainage from the incision soaked bed and padding. Dressing and bed linen change. Patient complaining of pain on sacrum. Mepilex applied to sacrum. Patient turned on left side. Will continue to monitor.

## 2022-01-25 NOTE — SUBJECTIVE & OBJECTIVE
Interval History: Patient had right BKA stump revised today in OR. Having post op pain.     Review of Systems   Respiratory: Negative for shortness of breath.    Gastrointestinal: Negative for diarrhea, nausea and vomiting.   Musculoskeletal:        Pain at right BKA stump site     Antibiotics (From admission, onward)            Start     Stop Route Frequency Ordered    01/21/22 2330  meropenem 1 g in sodium chloride 0.9 % 100 mL IVPB (ready to mix system)         -- IV Every 8 hours (non-standard times) 01/21/22 2222        Objective:     Vital Signs (Most Recent):  Temp: 98.3 °F (36.8 °C) (01/24/22 1931)  Pulse: (!) 112 (01/24/22 1931)  Resp: 16 (01/24/22 2101)  BP: (!) 110/46 (01/24/22 1931)  SpO2: 99 % (01/24/22 1954) Vital Signs (24h Range):  Temp:  [97.9 °F (36.6 °C)-98.6 °F (37 °C)] 98.3 °F (36.8 °C)  Pulse:  [] 112  Resp:  [16-20] 16  SpO2:  [97 %-100 %] 99 %  BP: (110-164)/(46-79) 110/46     Weight: 76.2 kg (167 lb 15.9 oz)  Body mass index is 27.96 kg/m².    Estimated Creatinine Clearance: 49.9 mL/min (based on SCr of 1.3 mg/dL).    Physical Exam  Cardiovascular:      Heart sounds: Normal heart sounds.   Pulmonary:      Breath sounds: Normal breath sounds.   Abdominal:      General: Bowel sounds are normal. There is no distension.      Palpations: Abdomen is soft.   Musculoskeletal:      Right lower leg: No edema.      Left lower leg: No edema.      Comments: Right bka stum bandaged. Left aka site with no lesions noted         Significant Labs:   Blood Culture:   Recent Labs   Lab 12/30/21  1412 01/02/22  1938 01/17/22  1548 01/19/22  0848   LABBLOO No growth after 5 days.  No growth after 5 days. No growth after 5 days.  No growth after 5 days. No growth after 5 days.  Gram stain salvatore bottle: Gram positive cocci in clusters resembling Staph   Results called to and read back by: Tita Tavarez RN 01/18/2022    19:36  Gram stain aer bottle: Gram positive cocci in clusters resembling Staph    Positive results previously called 01/19/2022  04:26  COAGULASE-NEGATIVE STAPHYLOCOCCUS SPECIES  Organism is a probable contaminant  * No growth after 5 days.  No growth after 5 days.       Urine Culture:   Recent Labs   Lab 12/21/21  2204 01/03/22  1724   LABURIN No growth CANDIDA GLABRATA  > 100,000 cfu/ml  Treatment of asymptomatic candiduria is not recommended (except for   specific populations). Candida isolated in the urine typically   represents colonization. If an indwelling urinary catheter is present  it should be removed or replaced.  *     Urine Studies:   Recent Labs   Lab 01/03/22  1724   COLORU Yellow   APPEARANCEUA Cloudy*   PHUR 6.0   SPECGRAV 1.020   PROTEINUA 3+*   GLUCUA Trace*   KETONESU Negative   BILIRUBINUA Negative   OCCULTUA 2+*   NITRITE Negative   UROBILINOGEN Negative   LEUKOCYTESUR 3+*   RBCUA 12*   WBCUA >100*   BACTERIA Moderate*   SQUAMEPITHEL 14   HYALINECASTS 0     Wound Culture:   Recent Labs   Lab 10/14/21  1108 01/17/22  1627 01/20/22  1451   LABAERO KLEBSIELLA PNEUMONIAE ESBL  Moderate  * PSEUDOMONAS AERUGINOSA  Many  *  KLEBSIELLA PNEUMONIAE ESBL  Moderate  * PSEUDOMONAS AERUGINOSA  Few  *  KLEBSIELLA PNEUMONIAE ESBL  Few  *       Significant Imaging: no new images since 1/3/22

## 2022-01-25 NOTE — PLAN OF CARE
Pt is awake and alert but has been confused for most of shift. Pt R stump wound with large amount of bleeding seen. Drsg changed by wound care nurse. Pt complains of pain to R stump. Dilaudid and now percocet given to patient. Pt receiving IV antibx as ordered.

## 2022-01-25 NOTE — PT/OT/SLP RE-EVAL
Occupational Therapy   Re-evaluation and tx w/ PT    Name: Suyapa Connelly  MRN: [id number]  Admitting Diagnosis:  Infected wound  Recent Surgery: Procedure(s) (LRB):  REVISION-STUMP (Right) 1 Day Post-Op  The primary encounter diagnosis was S/P BKA (below knee amputation) unilateral, right. Diagnoses of Visit for wound check, Status post below knee amputation, unspecified laterality, Chest pain, Infected wound, and Decubitus ulcer of sacral region, unstageable were also pertinent to this visit.    Recommendations:     Discharge Recommendations: rehabilitation facility  Discharge Equipment Recommendations:   none  Barriers to discharge:  None    Assessment:     Suyapa Connelly is a 56 y.o. female with a medical diagnosis of Infected wound.  She presents with Performance deficits affecting function are weakness,impaired endurance,impaired sensation,impaired self care skills,impaired functional mobilty,gait instability,impaired balance,decreased coordination,decreased upper extremity function,decreased lower extremity function,decreased safety awareness,pain,decreased ROM,impaired fine motor,impaired skin,edema,impaired coordination.    OT re-eval completed 2/2 R BKA revision 1/24/2022. OT goals remain appropriate, continue with POC.  IPR recommended.    Rehab Prognosis:  fair; patient would benefit from acute skilled OT services to address these deficits and reach maximum level of function.       Plan:     Patient to be seen 5 x/week to address the above listed problems via self-care/home management,therapeutic activities,therapeutic exercises  · Plan of Care Expires: 02/18/22  · Plan of Care Reviewed with: patient    Subjective     Chief Complaint: RLE pain  Patient/Family stated goals: my leg hurts.  Communicated with: nurse prior to session.  Pain/Comfort:  · Pain Rating 1: 8/10  · Location - Side 1: Right  · Location - Orientation 1: lower  · Location 1: leg (residual limb)  · Pain Addressed 1: Pre-medicate for  activity,Reposition,Distraction,Cessation of Activity  · Pain Rating Post-Intervention 1:  (did not rate)    Objective:     Communicated with: nurse prior to session.  Patient found HOB elevated with: bed alarm,PICC line upon OT entry to room.    General Precautions: Standard, contact,diabetic,fall   Orthopedic Precautions: (R BKA, L AKA (old))   Braces: N/A  Respiratory Status: Room air    Occupational Performance:    Bed Mobility:    · Patient completed Rolling/Turning to Right with minimum assistance, with side rail and inceased time due to RLE pain  · Patient completed Scooting/Bridging with maximal assistance and 2 persons  · Patient completed Supine to Sit with moderate assistance, jenise pad used to support RLE  to assist off bed 2/2 pain, pt tearful and grimacing.  · Patient completed Sit to Supine with minimum assistance and with side rail, jenise pad used to support RLE  to assist onto 2/2 pain, pt tearful and grimacing.    Functional Mobility/Transfers:  NT    Activities of Daily Living:  · Grooming: minimum assistance to comb hair seated suported EOB, increased posterior LOB, drowsy from pain meds.     Cognitive/Visual Perceptual:  Cognitive/Psychosocial Skills:     -       Oriented to: Person, Place, Time and Situation   -       Follows Commands/attention:Inattentive, Follows one-step commands and drowsy from painmeds  -       Communication: clear/fluent and low voice quality  -       Memory: NT  -       Safety awareness/insight to disability: impaired   -       Mood/Affect/Coping skills/emotional control: Cooperative, Flat affect and drowsy  Visual/Perceptual:      -Intact grossly    Physical Exam:  Balance:    -       sitting:  sttaic: min A 2/2 posterior LOB  dynamic;  poor: poor anterior WS, lateral; WS with BUE support with SBA  Postural examination/scapula alignment:    -       Rounded shoulders  -       Forward head  -       Posterior pelvic tilt  Skin integrity: RLE residual limb with ace wrap,  bloody drainage on ace wrap and bed pad; wound care arrived at end of tx session to change dressing.  Sacral breakdown,wound care assessed during session.  Sensation:    -       Impaired  neuropathy/numbness B hands/fingers  Dominant hand:    -       right  Upper Extremity Range of Motion:     -       Right Upper Extremity: WFL  -       Left Upper Extremity: WFL  Upper Extremity Strength:    -       Right Upper Extremity: grossly 3+/5-4-/5  -       Left Upper Extremity: Deficits: same as above   Strength:    -       Right Upper Extremity: Deficits: fair plus  -       Left Upper Extremity: Deficits: fair plus  Fine Motor Coordination:    -       Impaired  Left hand, manipulation of objects poor and Right hand, manipulation of objects poor    AMPAC 6 Click:  AMPAC Total Score: 15    Treatment & Education:  Education:  Role of OT and POC  PT worked on sitting balance trn on EOB with CGA-min A 2/2 posterior LOB seated with RLE supported on chair with pillow and BUE on bed. OT worked on g/hygiene seated EOB, min A for hair, SBA brushed teeth and wash face, pt drowsy, needed tc and vc to fully attended.     Patient left HOB elevated with all lines intact, call button in reach, bed alarm on and PT and wound care present    GOALS:   Multidisciplinary Problems     Occupational Therapy Goals        Problem: Occupational Therapy Goal    Goal Priority Disciplines Outcome Interventions   Occupational Therapy Goal     OT, PT/OT Ongoing, Progressing    Description: Goals to be met by: 2/18     Patient will increase functional independence with ADLs by performing:    UE Dressing with Stand-by Assistance.  LE Dressing with Minimal Assistance.  Grooming while seated with Modified [place].  Toilet transfer to bedside commode with Moderate Assistance.  Upper extremity exercise program x10 reps per handout, with independence.                     History:     Past Medical History:   Diagnosis Date    Anxiety     Chronic pain  syndrome     CKD (chronic kidney disease), stage III     Depression     Diabetes mellitus     type 2    Diabetes mellitus, type 2     GERD (gastroesophageal reflux disease)     Hyperemesis 3/23/2021    Hyperlipemia     Hypertension     Hypokalemia 3/23/2021    Myocardial infarction 2010    minor-caused by stress per pt.    Osteomyelitis     Osteomyelitis of left foot 4/30/2021    PVD (peripheral vascular disease)     Vaginal delivery     x1       Past Surgical History:   Procedure Laterality Date    ABOVE-KNEE AMPUTATION Left 5/18/2021    Procedure: AMPUTATION, ABOVE KNEE;  Surgeon: Teddy Huber MD;  Location: [facility] OR [address] [place] [address];  Service: Vascular;  Laterality: Left;    Angiogram - Right Extremity Right 7/9/15    angiogram-left leg  10/6/15    ANGIOGRAPHY OF LOWER EXTREMITY Left 4/29/2021    Procedure: ANGIOGRAM, LOWER EXTREMITY;  Surgeon: Teddy Huber MD;  Location: [facility] OR [address] [place] [address];  Service: Vascular;  Laterality: Left;    BELOW KNEE AMPUTATION OF LOWER EXTREMITY Right 12/28/2021    Procedure: AMPUTATION, BELOW KNEE;  Surgeon: Kaitlyn Rojas MD;  Location: [facility] OR;  Service: General;  Laterality: Right;    CATHETERIZATION OF BOTH LEFT AND RIGHT HEART N/A 12/18/2019    Procedure: CATHETERIZATION, HEART, BOTH LEFT AND RIGHT;  Surgeon: Que Fernando III, MD;  Location: [facility] CATH LAB;  Service: Cardiology;  Laterality: N/A;    CORONARY ANGIOGRAPHY N/A 12/18/2019    Procedure: ANGIOGRAM, CORONARY ARTERY;  Surgeon: Que Fernando III, MD;  Location: [facility] CATH LAB;  Service: Cardiology;  Laterality: N/A;    CORONARY ANGIOGRAPHY INCLUDING BYPASS GRAFTS WITH CATHETERIZATION OF LEFT HEART N/A 7/28/2020    Procedure: ANGIOGRAM, CORONARY, INCLUDING BYPASS GRAFT, WITH LEFT HEART CATHETERIZATION, 9 am;  Surgeon: Rachel Easley MD;  Location: [facility] CATH LAB;  Service: Cardiology;  Laterality: N/A;    CORONARY ARTERY BYPASS GRAFT (CABG) N/A 1/14/2020    Procedure: CORONARY  ARTERY BYPASS GRAFT (CABG) x 1     Off Pump;  Surgeon: Huang Altamirano MD;  Location: [address];  Service: Cardiovascular;  Laterality: N/A;    CREATION OF FEMORAL-TIBIAL ARTERY BYPASS Left 4/29/2021    Procedure: CREATION, BYPASS, ARTERIAL, FEMORAL TO ANTERIOR TIBIAL;  Surgeon: Teddy Huber MD;  Location: [address];  Service: Vascular;  Laterality: Left;    CREATION OF FEMOROPOPLITEAL ARTERIAL BYPASS USING GRAFT Left 8/18/2020    Procedure: CREATION, BYPASS, ARTERIAL, FEMORAL TO POPLITEAL, USING GRAFT, LEFT LOWER EXTREMITY;  Surgeon: Teddy Huber MD;  Location: [facility];  Service: Vascular;  Laterality: Left;  REQUEST 7:15 A.M. START----COVID NEGATIVE ON 8/17 1ST CASE STARTE PER LEANA ON 8/7/2020 @ 942AM-  RN PREOP 8/12/2020   T/S-----CLEARED BY CARDS-------PENDING INSURANCE    DEBRIDEMENT OF FOOT Left 9/8/2020    Procedure: DEBRIDEMENT, FOOT;  Surgeon: Rosio Mayes DPM;  Location: [facility];  Service: Podiatry;  Laterality: Left;  request neoxx .   RN Pre Op 9-4-2020, Covid negative on 9/5/20. C A    DEBRIDEMENT OF FOOT  3/4/2021    Procedure: DEBRIDEMENT, FOOT;  Surgeon: Teddy Huber MD;  Location: [facility];  Service: Vascular;;    DEBRIDEMENT OF FOOT Left 3/9/2021    Procedure: DEBRIDEMENT, FOOT, bone biopsy;  Surgeon: Rosio Mayes DPM;  Location: [facility] OR;  Service: Podiatry;  Laterality: Left;  Request neoxx---COVID IN AM  REQUESTING NOON START  RN Phone Pre op.On Blood thinners Plavix and Eliquis.  Covid am of surgery. C A    DEBRIDEMENT OF FOOT Left 5/4/2021    Procedure: DEBRIDEMENT, FOOT;  Surgeon: Farooq Morley DPM;  Location: [address];  Service: Podiatry;  Laterality: Left;    INSERTION OF TUNNELED CENTRAL VENOUS HEMODIALYSIS CATHETER N/A 1/27/2020    Procedure: Insertion, Catheter, Central Venous, Hemodialysis;  Surgeon: ESTEBAN Gomez III, MD;  Location: [facility] CATH LAB;  Service: Peripheral Vascular;  Laterality: N/A;    PERCUTANEOUS  TRANSLUMINAL ANGIOPLASTY N/A 3/4/2021    Procedure: PTA (ANGIOPLASTY, PERCUTANEOUS, TRANSLUMINAL);  Surgeon: Teddy Huber MD;  Location: [facility] OR;  Service: Vascular;  Laterality: N/A;    REMOVAL OF ARTERIOVENOUS GRAFT Left 5/27/2021    Procedure: REMOVAL, GRAFT, LEFT LOWER EXTREMITY, WOUND EXPLORATION;  Surgeon: Teddy Huber MD;  Location: [facility] OR 2ND FLR;  Service: Vascular;  Laterality: Left;    REMOVAL OF NAIL OF DIGIT Left 3/9/2021    Procedure: REMOVAL, NAIL, DIGIT;  Surgeon: Rosio Mayes DPM;  Location: [facility] OR;  Service: Podiatry;  Laterality: Left;    THROMBECTOMY Left 3/4/2021    Procedure: THROMBECTOMY, LEFT LOWER EXTREMITY BYPASS GRAFT, ANGIOGRAM, POSSIBLE INTERVENTION, POSSIBLE LEFT LOWER EXTREMITY BYPASS;  Surgeon: Teddy Huber MD;  Location: [facility] OR;  Service: Vascular;  Laterality: Left;    THROMBECTOMY Left 4/29/2021    Procedure: GRAFT THROMBECTOMY, LEFT LOWER EXTREMITY;  Surgeon: Teddy Huber MD;  Location: [facility] OR 2ND FLR;  Service: Vascular;  Laterality: Left;  14.5 min  1179.85 mGy  341.01 Gycm2  240 ml dye    THROMBECTOMY  10/22/2021    Procedure: THROMBECTOMY;  Surgeon: Saad Arenas MD;  Location: [facility] CATH LAB/EP;  Service: Cardiology;;       Time Tracking:     OT Date of Treatment: 01/25/22  OT Start Time: 1310  OT Stop Time: 1348  OT Total Time (min): 38 min    Billable Minutes:Re-eval 10  Self Care/Home Management 14  Total Time 24    1/25/2022

## 2022-01-25 NOTE — PROGRESS NOTES
[facility] Medicine  Progress Note    Patient Name: Suyapa Connelly  MRN: [id number]  Patient Class: IP- Inpatient   Admission Date: 1/17/2022  Length of Stay: 5 days  Attending Physician: Tawnya Montes*  Primary Care Provider: Magen Christensen MD        Subjective:     Principal Problem:Infected wound      HPI:  Suyapa Connelly is a 55 y/o female with PMHx CKD3, DMII, GERD, HLD, HTN, osteomyelitis, PVD, depression, and anxiety presents to ED with  with continue concern for wound infection.  Patient underwent right BKA by Dr. Rojas on 12/28 then discharged to rehab facility on 1/10.  Rehab facility staff became concerned for possible wound infection and patient was sent back to ED on 1/11.  Lab work was obtained, and images of wound were reviewed with Dr. Rojas, who recommended continued outpatient management.  At discharge patient was prescribed Augmentin, which she has continued taking as instructed.   at bedside is concern for progressively worsening appearance of wound, along with continued drainage.  Patient reporting worsened pain today described as sharp, worse with touch, radiating towards right knee, severity 8/10.  No fevers, chills, nausea, vomiting, chest pain, cough, shortness of breath, abdominal pain, urinary or bowel complications.  She does admit to not taking her BP medication today.  No other acute complaints at this time. Will admit to hospital medicine for observation services. General surgery consulted.       Overview/Hospital Course:  Pt placed in observation for evaluation of wound infection.  Pt s/p R BKA with Dr. Rojas on 12/28/21.  Vanc and cefazolin initiated.  Wound culture presumptive pseudomonas species cefazolin discontinued and changed to cefepime.  UC candida and BC 1 of 2 bottles coag-neg staph suspected contaminant.  Gen Surg consulted and recommended santyl, xeroform, 4 x 4, kerlix and ace bandage daily.  Wound culture pseudomonas  sensitive to cefepime, GNR with I&S pending.    PT/OT recommended rehab facility.  WC consulted and unstageable sacral decub.  Recommended waffle overlay and triad ointment to L buttocks BID and prn incontinent episode.      Interval History:  Awake and alert, acute events overnight, s/p debridement right BKA 1/24  Appreciate ID recs continue meropenem pending final culture DC vancomycin per surgery  Wound mild drainage continue daily dressing change with xeroform , 4 x 4 Kerlix, Ace bandage    Review of Systems   Constitutional: Negative for chills, diaphoresis, fatigue and fever.   HENT: Negative for hearing loss and sore throat.    Respiratory: Negative for cough and shortness of breath.    Cardiovascular: Negative for chest pain and leg swelling.   Gastrointestinal: Negative for abdominal pain, diarrhea, nausea and vomiting.   Genitourinary: Negative for difficulty urinating and flank pain.   Musculoskeletal: Negative for back pain.   Skin: Positive for wound. Negative for rash.   Neurological: Negative for dizziness, weakness and headaches.   Psychiatric/Behavioral: Negative for agitation and confusion.     Objective:     Vital Signs (Most Recent):  Temp: 98.9 °F (37.2 °C) (01/25/22 1208)  Pulse: 104 (01/25/22 1208)  Resp: 20 (01/25/22 1208)  BP: 123/76 (01/25/22 1208)  SpO2: 99 % (01/25/22 1208) Vital Signs (24h Range):  Temp:  [97.8 °F (36.6 °C)-98.9 °F (37.2 °C)] 98.9 °F (37.2 °C)  Pulse:  [104-112] 104  Resp:  [16-20] 20  SpO2:  [97 %-100 %] 99 %  BP: (109-149)/(46-80) 123/76     Weight: 76.2 kg (167 lb 15.9 oz)  Body mass index is 27.96 kg/m².    Intake/Output Summary (Last 24 hours) at 1/25/2022 1453  Last data filed at 1/25/2022 1300  Gross per 24 hour   Intake 357 ml   Output 1 ml   Net 356 ml      Physical Exam  Vitals and nursing note reviewed.   Constitutional:       General: She is not in acute distress.     Appearance: She is obese. She is not ill-appearing.   HENT:      Head: Normocephalic and  atraumatic.   Cardiovascular:      Rate and Rhythm: Normal rate and regular rhythm.      Pulses: Normal pulses.      Heart sounds: Normal heart sounds.   Pulmonary:      Effort: Pulmonary effort is normal.   Abdominal:      General: Bowel sounds are normal.      Palpations: Abdomen is soft.   Musculoskeletal:         General: No swelling.      Cervical back: Normal range of motion and neck supple.      Right Lower Extremity: Right leg is amputated below knee.      Left Lower Extremity: Left leg is amputated below knee.   Feet:      Right foot:      Skin integrity: No erythema or warmth.      Comments: S/p right BKA 12/28.   Wound dressing intact  Skin:     General: Skin is warm and dry.      Capillary Refill: Capillary refill takes less than 2 seconds.   Neurological:      General: No focal deficit present.      Mental Status: She is alert and oriented to person, place, and time.   Psychiatric:         Mood and Affect: Mood normal.         Behavior: Behavior normal. Behavior is cooperative.         Significant Labs: All pertinent labs within the past 24 hours have been reviewed.    Significant Imaging: I have reviewed all pertinent imaging results/findings within the past 24 hours.      Assessment/Plan:      * Infected wound  S/p right bka with Dr. Rojas on 12/28/21  Presents to ED with concerns for wound infection  BKA site with dehisced wound  Received zosyn x1  Vancomycin and cefazolin initiated, cultures pseudomonas.  Cefazolin changed to cefepime and Vanc.  General surgery consulted for possible debridement: check cultures , apply santyl , xeroform , 4 x 4 , kerlix , and ace bandage daily, iv antibiotics  ID consulted for recommendations on antibiotic therapy: vanc, cefepime.  Await final cultures. DC vancomycin on 1/24 and continue meropenem 1/24 pending final culture   PT/OT: rehab      Decubitus ulcer of sacral region, unstageable  Wound Care assistance appreciated  Recommended waffle overlay and triad  ointment to L buttocks BID and prn incontinent episode.    S/P BKA (below knee amputation) unilateral, right  See above      Phantom limb syndrome with pain  Resume home lyrica  Norco q4hr prn     Chronic anemia  Stable and at baseline      Type 2 diabetes mellitus with diabetic nephropathy, with long-term current use of insulin  Hemoglobin A1C   Date Value Ref Range Status   12/03/2021 9.6 (H) 4.0 - 5.6 % Final     Comment:     ADA Screening Guidelines:  5.7-6.4%  Consistent with prediabetes  >or=6.5%  Consistent with diabetes    High levels of fetal hemoglobin interfere with the HbA1C  assay. Heterozygous hemoglobin variants (HbS, HgC, etc)do  not significantly interfere with this assay.   However, presence of multiple variants may affect accuracy.                                       Patient's FSGs are uncontrolled due to hyperglycemia on current medication regimen.  - home regimen includes detemir, aspart  - hold home oral medications  - increased scheduled doses: detemir 10U qhs, aspart 2U tid plus scale will increase to 5U tid. Controlled.  - LDSSI with ACCUcheck ACHS  - Diabetic diet  -Hypoglycemia protocol PRN        Paroxysmal atrial fibrillation  Patient with paroxymal afib  Resumed home OAC, rate controlled  Monitor on telemetry          Mixed hyperlipidemia  Last LDL was   Lab Results   Component Value Date    LDLCALC 177.6 (H) 12/03/2021      Patient is chronically on statin.will continue for now.             MDD (major depressive disorder), recurrent episode, moderate  Resumed zoloft, stable      Peripheral vascular disease  Resume statin         VTE Risk Mitigation (From admission, onward)         Ordered     apixaban tablet 5 mg  2 times daily         01/17/22 1920     IP VTE HIGH RISK PATIENT  Once         01/17/22 1658     Place sequential compression device  Until discontinued         01/17/22 1658                Discharge Planning   DEVAN:      Code Status: Full Code   Is the patient medically  ready for discharge?:     Reason for patient still in hospital (select all that apply): Patient trending condition  Discharge Plan A: Rehab          Tawnya Montes MD  Department of Hospital Medicine   [facility]

## 2022-01-25 NOTE — PROGRESS NOTES
[facility] Surg  Infectious Disease  Progress Note    Patient Name: Suyapa Connelly  MRN: [id number]  Admission Date: 1/17/2022  Length of Stay: 4 days  Attending Physician: Tawnya Montes*  Primary Care Provider: Magen Christensen MD    Isolation Status: Contact  Assessment/Plan:      * Infected wound  57 y/o female with PMHx CKD3, DMII, GERD, HLD, HTN, osteomyelitis, PVD, depression, and anxiety presents to ED with  with continue concern for wound infection of right BKA wound. BC 1/17 - 1 of 2 sets with coag negative staph - likely contaminant. Right knee wound 1/17 positive for pseudomonas and Klebsiella ESBL. Right leg wound culture 1/20 positive for pseudomonas and Klebsiella ESBL      -await final cxs - continue meropenem   -surgery revised right stump 1/24/22 but no new cultures obtained - will check with surgery about duration of antibitoics            Anticipated Disposition: unclear at this point    Thank you for your consult. I will follow-up with patient. Please contact us if you have any additional questions.[phone]    Geno Sethi MD  Infectious Disease  [facility] - Med Surg    Subjective:     Principal Problem:Infected wound    HPI: 57 y/o female with PMHx CKD3, DMII, GERD, HLD, HTN, osteomyelitis, PVD, depression, and anxiety presents to ED with  with continue concern for wound infection.  Patient underwent right BKA by Dr. Rojas on 12/28 then discharged to rehab facility on 1/10.  Rehab facility staff became concerned for possible wound infection and patient was sent back to ED on 1/11.  Lab work was obtained, and images of wound were reviewed with Dr. Rojas, who recommended continued outpatient management.  At discharge patient was prescribed Augmentin, which she has continued taking as instructed.   at bedside is concern for progressively worsening appearance of wound, along with continued drainage.  Patient reporting worsened pain today described as sharp, worse  with touch, radiating towards right knee, severity 8/10.  No fevers, chills, nausea, vomiting, chest pain, cough, shortness of breath, abdominal pain, urinary or bowel complications      ID is consulted 1/19/22 for infection of BKA site with wound cxs growing presumptive pseudomonas  1/20/22 - new gram negative favio on culture in addition to the pseudomonas; continue vanco and cefepime  1/21/22 - the right knee culture from 1/17 positive for pseudomonas and Klebsiella ESBL - changed cefepime to meropenem and continued vancomycin  1/22/22 - right leg wound from 1/20/22 positive for pseudomonas and GNR  1/23/22 - right leg wound from 1/20/22 positive for pseudomonas and klebsiella ESBL; vancomycin stopped  1/24/22 - right BKA stump revision in OR    Interval History: Patient had right BKA stump revised today in OR. Having post op pain.     Review of Systems   Respiratory: Negative for shortness of breath.    Gastrointestinal: Negative for diarrhea, nausea and vomiting.   Musculoskeletal:        Pain at right BKA stump site     Antibiotics (From admission, onward)            Start     Stop Route Frequency Ordered    01/21/22 2330  meropenem 1 g in sodium chloride 0.9 % 100 mL IVPB (ready to mix system)         -- IV Every 8 hours (non-standard times) 01/21/22 2222        Objective:     Vital Signs (Most Recent):  Temp: 98.3 °F (36.8 °C) (01/24/22 1931)  Pulse: (!) 112 (01/24/22 1931)  Resp: 16 (01/24/22 2101)  BP: (!) 110/46 (01/24/22 1931)  SpO2: 99 % (01/24/22 1954) Vital Signs (24h Range):  Temp:  [97.9 °F (36.6 °C)-98.6 °F (37 °C)] 98.3 °F (36.8 °C)  Pulse:  [] 112  Resp:  [16-20] 16  SpO2:  [97 %-100 %] 99 %  BP: (110-164)/(46-79) 110/46     Weight: 76.2 kg (167 lb 15.9 oz)  Body mass index is 27.96 kg/m².    Estimated Creatinine Clearance: 49.9 mL/min (based on SCr of 1.3 mg/dL).    Physical Exam  Cardiovascular:      Heart sounds: Normal heart sounds.   Pulmonary:      Breath sounds: Normal breath sounds.    Abdominal:      General: Bowel sounds are normal. There is no distension.      Palpations: Abdomen is soft.   Musculoskeletal:      Right lower leg: No edema.      Left lower leg: No edema.      Comments: Right bka stum bandaged. Left aka site with no lesions noted         Significant Labs:   Blood Culture:   Recent Labs   Lab 12/30/21  1412 01/02/22  1938 01/17/22  1548 01/19/22  0848   LABBLOO No growth after 5 days.  No growth after 5 days. No growth after 5 days.  No growth after 5 days. No growth after 5 days.  Gram stain salvatore bottle: Gram positive cocci in clusters resembling Staph   Results called to and read back by: Tita Tavarez RN 01/18/2022    19:36  Gram stain aer bottle: Gram positive cocci in clusters resembling Staph   Positive results previously called 01/19/2022  04:26  COAGULASE-NEGATIVE STAPHYLOCOCCUS SPECIES  Organism is a probable contaminant  * No growth after 5 days.  No growth after 5 days.       Urine Culture:   Recent Labs   Lab 12/21/21  2204 01/03/22  1724   LABURIN No growth CANDIDA GLABRATA  > 100,000 cfu/ml  Treatment of asymptomatic candiduria is not recommended (except for   specific populations). Candida isolated in the urine typically   represents colonization. If an indwelling urinary catheter is present  it should be removed or replaced.  *     Urine Studies:   Recent Labs   Lab 01/03/22  1724   COLORU Yellow   APPEARANCEUA Cloudy*   PHUR 6.0   SPECGRAV 1.020   PROTEINUA 3+*   GLUCUA Trace*   KETONESU Negative   BILIRUBINUA Negative   OCCULTUA 2+*   NITRITE Negative   UROBILINOGEN Negative   LEUKOCYTESUR 3+*   RBCUA 12*   WBCUA >100*   BACTERIA Moderate*   SQUAMEPITHEL 14   HYALINECASTS 0     Wound Culture:   Recent Labs   Lab 10/14/21  1108 01/17/22  1627 01/20/22  1451   LABAERO KLEBSIELLA PNEUMONIAE ESBL  Moderate  * PSEUDOMONAS AERUGINOSA  Many  *  KLEBSIELLA PNEUMONIAE ESBL  Moderate  * PSEUDOMONAS AERUGINOSA  Few  *  KLEBSIELLA PNEUMONIAE ESBL  Few  *        Significant Imaging: no new images since 1/3/22

## 2022-01-26 ENCOUNTER — PATIENT MESSAGE (OUTPATIENT)
Dept: ADMINISTRATIVE | Facility: HOSPITAL | Age: 57
End: 2022-01-26
Payer: MEDICARE

## 2022-01-26 LAB
ABO + RH BLD: NORMAL
ALBUMIN SERPL BCP-MCNC: 1.8 G/DL (ref 3.5–5.2)
ALP SERPL-CCNC: 124 U/L (ref 55–135)
ALT SERPL W/O P-5'-P-CCNC: 19 U/L (ref 10–44)
ANION GAP SERPL CALC-SCNC: 9 MMOL/L (ref 8–16)
AST SERPL-CCNC: 41 U/L (ref 10–40)
BASOPHILS # BLD AUTO: 0.08 K/UL (ref 0–0.2)
BASOPHILS NFR BLD: 0.9 % (ref 0–1.9)
BILIRUB SERPL-MCNC: 0.2 MG/DL (ref 0.1–1)
BLD GP AB SCN CELLS X3 SERPL QL: NORMAL
BLD PROD TYP BPU: NORMAL
BLOOD UNIT EXPIRATION DATE: NORMAL
BLOOD UNIT TYPE CODE: 5100
BLOOD UNIT TYPE: NORMAL
BUN SERPL-MCNC: 61 MG/DL (ref 6–20)
CALCIUM SERPL-MCNC: 8.8 MG/DL (ref 8.7–10.5)
CHLORIDE SERPL-SCNC: 104 MMOL/L (ref 95–110)
CO2 SERPL-SCNC: 26 MMOL/L (ref 23–29)
CODING SYSTEM: NORMAL
CREAT SERPL-MCNC: 1.4 MG/DL (ref 0.5–1.4)
DIFFERENTIAL METHOD: ABNORMAL
DISPENSE STATUS: NORMAL
EOSINOPHIL # BLD AUTO: 0.2 K/UL (ref 0–0.5)
EOSINOPHIL NFR BLD: 2.1 % (ref 0–8)
ERYTHROCYTE [DISTWIDTH] IN BLOOD BY AUTOMATED COUNT: 15 % (ref 11.5–14.5)
EST. GFR  (AFRICAN AMERICAN): 49 ML/MIN/1.73 M^2
EST. GFR  (NON AFRICAN AMERICAN): 42 ML/MIN/1.73 M^2
GLUCOSE SERPL-MCNC: 113 MG/DL (ref 70–110)
HCT VFR BLD AUTO: 20.9 % (ref 37–48.5)
HGB BLD-MCNC: 6.2 G/DL (ref 12–16)
IMM GRANULOCYTES # BLD AUTO: 0.04 K/UL (ref 0–0.04)
IMM GRANULOCYTES NFR BLD AUTO: 0.4 % (ref 0–0.5)
LYMPHOCYTES # BLD AUTO: 1.7 K/UL (ref 1–4.8)
LYMPHOCYTES NFR BLD: 19 % (ref 18–48)
MCH RBC QN AUTO: 29.8 PG (ref 27–31)
MCHC RBC AUTO-ENTMCNC: 29.7 G/DL (ref 32–36)
MCV RBC AUTO: 101 FL (ref 82–98)
MONOCYTES # BLD AUTO: 1 K/UL (ref 0.3–1)
MONOCYTES NFR BLD: 10.6 % (ref 4–15)
NEUTROPHILS # BLD AUTO: 6.2 K/UL (ref 1.8–7.7)
NEUTROPHILS NFR BLD: 67 % (ref 38–73)
NRBC BLD-RTO: 0 /100 WBC
NUM UNITS TRANS PACKED RBC: NORMAL
PLATELET # BLD AUTO: 339 K/UL (ref 150–450)
PMV BLD AUTO: 11.5 FL (ref 9.2–12.9)
POCT GLUCOSE: 126 MG/DL (ref 70–110)
POCT GLUCOSE: 167 MG/DL (ref 70–110)
POCT GLUCOSE: 218 MG/DL (ref 70–110)
POCT GLUCOSE: 240 MG/DL (ref 70–110)
POTASSIUM SERPL-SCNC: 4.9 MMOL/L (ref 3.5–5.1)
PROT SERPL-MCNC: 5.9 G/DL (ref 6–8.4)
RBC # BLD AUTO: 2.08 M/UL (ref 4–5.4)
SODIUM SERPL-SCNC: 139 MMOL/L (ref 136–145)
WBC # BLD AUTO: 9.17 K/UL (ref 3.9–12.7)

## 2022-01-26 PROCEDURE — 63600175 PHARM REV CODE 636 W HCPCS: Mod: HCNC | Performed by: FAMILY MEDICINE

## 2022-01-26 PROCEDURE — 36410 VNPNXR 3YR/> PHY/QHP DX/THER: CPT | Mod: HCNC

## 2022-01-26 PROCEDURE — 25000003 PHARM REV CODE 250: Mod: HCNC

## 2022-01-26 PROCEDURE — 25000003 PHARM REV CODE 250: Mod: HCNC | Performed by: SURGERY

## 2022-01-26 PROCEDURE — 86901 BLOOD TYPING SEROLOGIC RH(D): CPT | Mod: HCNC | Performed by: FAMILY MEDICINE

## 2022-01-26 PROCEDURE — 97530 THERAPEUTIC ACTIVITIES: CPT | Mod: HCNC

## 2022-01-26 PROCEDURE — 86920 COMPATIBILITY TEST SPIN: CPT | Mod: HCNC | Performed by: FAMILY MEDICINE

## 2022-01-26 PROCEDURE — 36415 COLL VENOUS BLD VENIPUNCTURE: CPT | Mod: HCNC | Performed by: FAMILY MEDICINE

## 2022-01-26 PROCEDURE — 86900 BLOOD TYPING SEROLOGIC ABO: CPT | Mod: HCNC | Performed by: FAMILY MEDICINE

## 2022-01-26 PROCEDURE — 94761 N-INVAS EAR/PLS OXIMETRY MLT: CPT | Mod: HCNC

## 2022-01-26 PROCEDURE — 36430 TRANSFUSION BLD/BLD COMPNT: CPT | Mod: HCNC

## 2022-01-26 PROCEDURE — 85025 COMPLETE CBC W/AUTO DIFF WBC: CPT | Mod: HCNC | Performed by: FAMILY MEDICINE

## 2022-01-26 PROCEDURE — 63600175 PHARM REV CODE 636 W HCPCS: Mod: HCNC | Performed by: SURGERY

## 2022-01-26 PROCEDURE — 80053 COMPREHEN METABOLIC PANEL: CPT | Mod: HCNC | Performed by: FAMILY MEDICINE

## 2022-01-26 PROCEDURE — 25000003 PHARM REV CODE 250: Mod: HCNC | Performed by: FAMILY MEDICINE

## 2022-01-26 PROCEDURE — 11000001 HC ACUTE MED/SURG PRIVATE ROOM: Mod: HCNC

## 2022-01-26 PROCEDURE — 97110 THERAPEUTIC EXERCISES: CPT | Mod: HCNC

## 2022-01-26 PROCEDURE — 27000207 HC ISOLATION: Mod: HCNC

## 2022-01-26 PROCEDURE — P9016 RBC LEUKOCYTES REDUCED: HCPCS | Mod: HCNC | Performed by: FAMILY MEDICINE

## 2022-01-26 RX ORDER — HYDROCODONE BITARTRATE AND ACETAMINOPHEN 500; 5 MG/1; MG/1
TABLET ORAL
Status: DISCONTINUED | OUTPATIENT
Start: 2022-01-26 | End: 2022-01-30 | Stop reason: HOSPADM

## 2022-01-26 RX ADMIN — OXYCODONE AND ACETAMINOPHEN 1 TABLET: 10; 325 TABLET ORAL at 08:01

## 2022-01-26 RX ADMIN — MIRTAZAPINE 15 MG: 7.5 TABLET ORAL at 08:01

## 2022-01-26 RX ADMIN — FUROSEMIDE 40 MG: 40 TABLET ORAL at 05:01

## 2022-01-26 RX ADMIN — FUROSEMIDE 40 MG: 40 TABLET ORAL at 08:01

## 2022-01-26 RX ADMIN — SERTRALINE HYDROCHLORIDE 50 MG: 50 TABLET ORAL at 08:01

## 2022-01-26 RX ADMIN — INSULIN ASPART 5 UNITS: 100 INJECTION, SOLUTION INTRAVENOUS; SUBCUTANEOUS at 05:01

## 2022-01-26 RX ADMIN — OXYCODONE AND ACETAMINOPHEN 1 TABLET: 10; 325 TABLET ORAL at 01:01

## 2022-01-26 RX ADMIN — MEROPENEM 1 G: 1 INJECTION, POWDER, FOR SOLUTION INTRAVENOUS at 10:01

## 2022-01-26 RX ADMIN — PREGABALIN 150 MG: 75 CAPSULE ORAL at 03:01

## 2022-01-26 RX ADMIN — SODIUM BICARBONATE 650 MG: 650 TABLET ORAL at 08:01

## 2022-01-26 RX ADMIN — INSULIN ASPART 5 UNITS: 100 INJECTION, SOLUTION INTRAVENOUS; SUBCUTANEOUS at 12:01

## 2022-01-26 RX ADMIN — MEROPENEM 1 G: 1 INJECTION, POWDER, FOR SOLUTION INTRAVENOUS at 07:01

## 2022-01-26 RX ADMIN — HYDROMORPHONE HYDROCHLORIDE 0.5 MG: 1 INJECTION, SOLUTION INTRAMUSCULAR; INTRAVENOUS; SUBCUTANEOUS at 03:01

## 2022-01-26 RX ADMIN — MEROPENEM 1 G: 1 INJECTION, POWDER, FOR SOLUTION INTRAVENOUS at 12:01

## 2022-01-26 RX ADMIN — APIXABAN 5 MG: 5 TABLET, FILM COATED ORAL at 08:01

## 2022-01-26 RX ADMIN — CLOPIDOGREL 75 MG: 75 TABLET, FILM COATED ORAL at 08:01

## 2022-01-26 RX ADMIN — ALLOPURINOL 100 MG: 100 TABLET ORAL at 08:01

## 2022-01-26 RX ADMIN — INSULIN ASPART 2 UNITS: 100 INJECTION, SOLUTION INTRAVENOUS; SUBCUTANEOUS at 05:01

## 2022-01-26 RX ADMIN — PREGABALIN 150 MG: 75 CAPSULE ORAL at 08:01

## 2022-01-26 RX ADMIN — ATORVASTATIN CALCIUM 80 MG: 40 TABLET, FILM COATED ORAL at 08:01

## 2022-01-26 RX ADMIN — INSULIN ASPART 2 UNITS: 100 INJECTION, SOLUTION INTRAVENOUS; SUBCUTANEOUS at 12:01

## 2022-01-26 NOTE — PLAN OF CARE
Problem: Physical Therapy Goal  Goal: Physical Therapy Goal  Description: Goals to be met by: 22     Patient will increase functional independence with mobility by performin. Supine <> sit with Stand-by Assistance  2. Bed to chair transfer with Minimal Assistance using scoot transfer or slideboard/beasy board  3. Wheelchair propulsion x 50 feet with Stand-by Assistance using bilateral upper extremities MET 2022    Updated  3. Wheelchair propulsion x 150 feet with Stand-by Assistance using bilateral upper extremities    Outcome: Ongoing, Progressing  PT re-eval performed following R BKA revision 2022; PT goals remain appropriate; cont with POC; recommend IPR.

## 2022-01-26 NOTE — PROGRESS NOTES
[facility] Medicine  Progress Note    Patient Name: Suyapa Connelly  MRN: [id number]  Patient Class: IP- Inpatient   Admission Date: 1/17/2022  Length of Stay: 6 days  Attending Physician: Tawnya Montes*  Primary Care Provider: Magen Christensen MD        Subjective:     Principal Problem:Infected wound        HPI:  Suyapa Connelly is a 57 y/o female with PMHx CKD3, DMII, GERD, HLD, HTN, osteomyelitis, PVD, depression, and anxiety presents to ED with  with continue concern for wound infection.  Patient underwent right BKA by Dr. Rojas on 12/28 then discharged to rehab facility on 1/10.  Rehab facility staff became concerned for possible wound infection and patient was sent back to ED on 1/11.  Lab work was obtained, and images of wound were reviewed with Dr. Rojas, who recommended continued outpatient management.  At discharge patient was prescribed Augmentin, which she has continued taking as instructed.   at bedside is concern for progressively worsening appearance of wound, along with continued drainage.  Patient reporting worsened pain today described as sharp, worse with touch, radiating towards right knee, severity 8/10.  No fevers, chills, nausea, vomiting, chest pain, cough, shortness of breath, abdominal pain, urinary or bowel complications.  She does admit to not taking her BP medication today.  No other acute complaints at this time. Will admit to hospital medicine for observation services. General surgery consulted.       Overview/Hospital Course:  Pt placed in observation for evaluation of wound infection.  Pt s/p R BKA with Dr. Rojas on 12/28/21.  Vanc and cefazolin initiated.  Wound culture presumptive pseudomonas species cefazolin discontinued and changed to cefepime.  UC candida and BC 1 of 2 bottles coag-neg staph suspected contaminant.  Gen Surg consulted and recommended santyl, xeroform, 4 x 4, kerlix and ace bandage daily.  Wound culture pseudomonas  sensitive to cefepime, GNR with I&S pending.    PT/OT recommended rehab facility.  WC consulted and unstageable sacral decub.  Recommended waffle overlay and triad ointment to L buttocks BID and prn incontinent episode.      Interval History:  Awake and alert, acute events overnight, s/p debridement right BKA 1/24  Appreciate ID recs continue  IV antibiotics x 2 weeks of past the R BKA stump revision surgery on 1/24/22. So stop date  February 7, 2022.   Wound mild drainage continue daily dressing change with xeroform , 4 x 4 Kerlix, Ace bandage    Review of Systems   Constitutional: Negative for chills, diaphoresis, fatigue and fever.   HENT: Negative for hearing loss and sore throat.    Respiratory: Negative for cough and shortness of breath.    Cardiovascular: Negative for chest pain and leg swelling.   Gastrointestinal: Negative for abdominal pain, diarrhea, nausea and vomiting.   Genitourinary: Negative for difficulty urinating and flank pain.   Musculoskeletal: Negative for back pain.   Skin: Positive for wound. Negative for rash.   Neurological: Negative for dizziness, weakness and headaches.   Psychiatric/Behavioral: Negative for agitation and confusion.     Objective:     Vital Signs (Most Recent):  Temp: 98.3 °F (36.8 °C) (01/26/22 0438)  Pulse: 109 (01/26/22 0438)  Resp: 18 (01/26/22 0438)  BP: (!) 116/57 (01/26/22 0438)  SpO2: 99 % (01/26/22 0438) Vital Signs (24h Range):  Temp:  [98.2 °F (36.8 °C)-98.9 °F (37.2 °C)] 98.3 °F (36.8 °C)  Pulse:  [104-112] 109  Resp:  [16-20] 18  SpO2:  [92 %-100 %] 99 %  BP: (116-147)/(55-80) 116/57     Weight: 78.1 kg (172 lb 2.9 oz)  Body mass index is 28.65 kg/m².    Intake/Output Summary (Last 24 hours) at 1/26/2022 0715  Last data filed at 1/25/2022 1300  Gross per 24 hour   Intake 357 ml   Output --   Net 357 ml      Physical Exam  Vitals and nursing note reviewed.   Constitutional:       General: She is not in acute distress.     Appearance: She is obese. She is not  ill-appearing.   HENT:      Head: Normocephalic and atraumatic.   Cardiovascular:      Rate and Rhythm: Normal rate and regular rhythm.      Pulses: Normal pulses.      Heart sounds: Normal heart sounds.   Pulmonary:      Effort: Pulmonary effort is normal.   Abdominal:      General: Bowel sounds are normal.      Palpations: Abdomen is soft.   Musculoskeletal:         General: No swelling.      Cervical back: Normal range of motion and neck supple.      Right Lower Extremity: Right leg is amputated below knee.      Left Lower Extremity: Left leg is amputated below knee.   Feet:      Right foot:      Skin integrity: No erythema or warmth.      Comments: S/p right BKA 12/28.   Wound dressing intact  Skin:     General: Skin is warm and dry.      Capillary Refill: Capillary refill takes less than 2 seconds.   Neurological:      General: No focal deficit present.      Mental Status: She is alert and oriented to person, place, and time.   Psychiatric:         Mood and Affect: Mood normal.         Behavior: Behavior normal. Behavior is cooperative.         Significant Labs: All pertinent labs within the past 24 hours have been reviewed.    Significant Imaging: I have reviewed all pertinent imaging results/findings within the past 24 hours.      Assessment/Plan:      * Infected wound  S/p right bka with Dr. Rojas on 12/28/21  Presents to ED with concerns for wound infection  BKA site with dehisced wound  Received zosyn x1  Vancomycin and cefazolin initiated, cultures pseudomonas.  Cefazolin changed to cefepime and Vanc.  General surgery consulted for possible debridement: check cultures , apply santyl , xeroform , 4 x 4 , kerlix , and ace bandage daily, iv antibiotics  ID consulted for recommendations on antibiotic therapy: vanc, cefepime.  Await final cultures. DC vancomycin on 1/24 and continue meropenem 1/24 pending final culture  plan 2 weeks of IV antibiotics past the R BKA stump revision surgery on 1/24/22. So stop  date February 7, 2022.   Case Management trying to place patient   Need to get cbc and cmp at least weekly while on antibiotics  If needed can fax labs to Dr. Espino at [phone]   PT/OT: rehab      Decubitus ulcer of sacral region, unstageable  Wound Care assistance appreciated  Recommended waffle overlay and triad ointment to L buttocks BID and prn incontinent episode.    S/P BKA (below knee amputation) unilateral, right  See above      Phantom limb syndrome with pain  Resume home lyrica  Norco q4hr prn     Chronic anemia  Stable and at baseline      Type 2 diabetes mellitus with diabetic nephropathy, with long-term current use of insulin  Hemoglobin A1C   Date Value Ref Range Status   12/03/2021 9.6 (H) 4.0 - 5.6 % Final     Comment:     ADA Screening Guidelines:  5.7-6.4%  Consistent with prediabetes  >or=6.5%  Consistent with diabetes    High levels of fetal hemoglobin interfere with the HbA1C  assay. Heterozygous hemoglobin variants (HbS, HgC, etc)do  not significantly interfere with this assay.   However, presence of multiple variants may affect accuracy.                                       Patient's FSGs are uncontrolled due to hyperglycemia on current medication regimen.  - home regimen includes detemir, aspart  - hold home oral medications  - increased scheduled doses: detemir 10U qhs, aspart 2U tid plus scale will increase to 5U tid. Controlled.  - LDSSI with ACCUcheck ACHS  - Diabetic diet  -Hypoglycemia protocol PRN        Paroxysmal atrial fibrillation  Patient with paroxymal afib  Resumed home OAC, rate controlled  Monitor on telemetry          Mixed hyperlipidemia  Last LDL was   Lab Results   Component Value Date    LDLCALC 177.6 (H) 12/03/2021      Patient is chronically on statin.will continue for now.             MDD (major depressive disorder), recurrent episode, moderate  Resumed zoloft, stable      Peripheral vascular disease  Resume statin         VTE Risk Mitigation (From admission, onward)          Ordered     apixaban tablet 5 mg  2 times daily         01/17/22 1920     IP VTE HIGH RISK PATIENT  Once         01/17/22 1658     Place sequential compression device  Until discontinued         01/17/22 1658                Discharge Planning   DEVAN:      Code Status: Full Code   Is the patient medically ready for discharge?:     Reason for patient still in hospital (select all that apply): Pending disposition  Discharge Plan A: Rehab                  Tawnya Montes MD  Department of Hospital Medicine   [facility] Surg

## 2022-01-26 NOTE — SUBJECTIVE & OBJECTIVE
Interval History:  Awake and alert, acute events overnight, s/p debridement right BKA 1/24  Appreciate ID recs continue  IV antibiotics x 2 weeks of past the R BKA stump revision surgery on 1/24/22. So stop date  February 7, 2022.   Wound mild drainage continue daily dressing change with xeroform , 4 x 4 Kerlix, Ace bandage    Review of Systems   Constitutional: Negative for chills, diaphoresis, fatigue and fever.   HENT: Negative for hearing loss and sore throat.    Respiratory: Negative for cough and shortness of breath.    Cardiovascular: Negative for chest pain and leg swelling.   Gastrointestinal: Negative for abdominal pain, diarrhea, nausea and vomiting.   Genitourinary: Negative for difficulty urinating and flank pain.   Musculoskeletal: Negative for back pain.   Skin: Positive for wound. Negative for rash.   Neurological: Negative for dizziness, weakness and headaches.   Psychiatric/Behavioral: Negative for agitation and confusion.     Objective:     Vital Signs (Most Recent):  Temp: 98.3 °F (36.8 °C) (01/26/22 0438)  Pulse: 109 (01/26/22 0438)  Resp: 18 (01/26/22 0438)  BP: (!) 116/57 (01/26/22 0438)  SpO2: 99 % (01/26/22 0438) Vital Signs (24h Range):  Temp:  [98.2 °F (36.8 °C)-98.9 °F (37.2 °C)] 98.3 °F (36.8 °C)  Pulse:  [104-112] 109  Resp:  [16-20] 18  SpO2:  [92 %-100 %] 99 %  BP: (116-147)/(55-80) 116/57     Weight: 78.1 kg (172 lb 2.9 oz)  Body mass index is 28.65 kg/m².    Intake/Output Summary (Last 24 hours) at 1/26/2022 0715  Last data filed at 1/25/2022 1300  Gross per 24 hour   Intake 357 ml   Output --   Net 357 ml      Physical Exam  Vitals and nursing note reviewed.   Constitutional:       General: She is not in acute distress.     Appearance: She is obese. She is not ill-appearing.   HENT:      Head: Normocephalic and atraumatic.   Cardiovascular:      Rate and Rhythm: Normal rate and regular rhythm.      Pulses: Normal pulses.      Heart sounds: Normal heart sounds.   Pulmonary:       Effort: Pulmonary effort is normal.   Abdominal:      General: Bowel sounds are normal.      Palpations: Abdomen is soft.   Musculoskeletal:         General: No swelling.      Cervical back: Normal range of motion and neck supple.      Right Lower Extremity: Right leg is amputated below knee.      Left Lower Extremity: Left leg is amputated below knee.   Feet:      Right foot:      Skin integrity: No erythema or warmth.      Comments: S/p right BKA 12/28.   Wound dressing intact  Skin:     General: Skin is warm and dry.      Capillary Refill: Capillary refill takes less than 2 seconds.   Neurological:      General: No focal deficit present.      Mental Status: She is alert and oriented to person, place, and time.   Psychiatric:         Mood and Affect: Mood normal.         Behavior: Behavior normal. Behavior is cooperative.         Significant Labs: All pertinent labs within the past 24 hours have been reviewed.    Significant Imaging: I have reviewed all pertinent imaging results/findings within the past 24 hours.

## 2022-01-26 NOTE — PROGRESS NOTES
Pharmacist Renal Dose Adjustment Note    Suyapa Connelly is a 56 y.o. female being treated with the medication meropenem    Patient Data:    Vital Signs (Most Recent):  Temp: 98.3 °F (36.8 °C) (01/26/22 1158)  Pulse: 100 (01/26/22 1158)  Resp: 18 (01/26/22 1158)  BP: 132/60 (01/26/22 1158)  SpO2: 98 % (01/26/22 1225) Vital Signs (72h Range):  Temp:  [97.8 °F (36.6 °C)-99 °F (37.2 °C)]   Pulse:  []   Resp:  [16-20]   BP: (109-164)/(46-80)   SpO2:  [92 %-100 %]      Recent Labs   Lab 01/21/22  0442 01/22/22  0335 01/26/22  0732   CREATININE 1.1 1.3 1.4     Serum creatinine: 1.4 mg/dL 01/26/22 0732  Estimated creatinine clearance: 46.9 mL/min    Medication:Meropenem dose: 1gh frequency q 8 hours will be changed to medication:meropenem dose:1g frequency:q 12 hours    Pharmacist's Name: Earnestine Olivares  Pharmacist's Extension: [phone]

## 2022-01-26 NOTE — PROGRESS NOTES
"Surgery follow up  BP (!) 122/53 (Patient Position: Sitting)   Pulse (!) 116   Temp 98.2 °F (36.8 °C) (Oral)   Resp 17   Ht 5' 5" (1.651 m)   Wt 78.1 kg (172 lb 2.9 oz)   LMP 09/30/2015 (Exact Date)   SpO2 98%   BMI 28.65 kg/m²   I/O last 3 completed shifts:  In: 357 [P.O.:357]  Out: 1 [Urine:1]  No intake/output data recorded.      Recent Results (from the past 336 hour(s))   CBC Auto Differential    Collection Time: 01/26/22  7:32 AM   Result Value Ref Range    WBC 9.17 3.90 - 12.70 K/uL    Hemoglobin 6.2 (L) 12.0 - 16.0 g/dL    Hematocrit 20.9 (L) 37.0 - 48.5 %    Platelets 339 150 - 450 K/uL   CBC Auto Differential    Collection Time: 01/22/22  3:35 AM   Result Value Ref Range    WBC 7.40 3.90 - 12.70 K/uL    Hemoglobin 8.1 (L) 12.0 - 16.0 g/dL    Hematocrit 27.1 (L) 37.0 - 48.5 %    Platelets 268 150 - 450 K/uL   CBC Auto Differential    Collection Time: 01/21/22  4:42 AM   Result Value Ref Range    WBC 7.53 3.90 - 12.70 K/uL    Hemoglobin 7.8 (L) 12.0 - 16.0 g/dL    Hematocrit 25.2 (L) 37.0 - 48.5 %    Platelets 318 150 - 450 K/uL   stump still oozing , hbg/hct low , will transfuse 2 units packed terrell.    "

## 2022-01-26 NOTE — ASSESSMENT & PLAN NOTE
55 y/o female with PMHx CKD3, DMII, GERD, HLD, HTN, osteomyelitis, PVD, depression, and anxiety presents to ED with  with continue concern for wound infection of right BKA wound. BC 1/17 - 1 of 2 sets with coag negative staph - likely contaminant. Right knee wound 1/17 positive for pseudomonas and Klebsiella ESBL. Right leg wound culture 1/20 positive for pseudomonas and Klebsiella ESBL. Surgery revised right stump 1/24/22 but no new cultures obtained.     Final ID Plan:   Discussed with Surgery - plan 2 weeks of IV antibiotics past the R BKA stump revision surgery on 1/24/22. So stop date is planned for February 7, 2022.   Case Management trying to place patient   Need to get cbc and cmp at least weekly while on antibiotics  If needed can fax labs to Dr. Espino at [phone]  No need for [facility] follow up     [facility] will sign off - call if questions [phone].

## 2022-01-26 NOTE — PLAN OF CARE
"chart reviewed - case discussed in AM MD meeting   pt stable for transfer to [facility]    TN and MD communicated with [facility] nurse today per Secure Chat   Vicki has submitted for auth - pending "ok" -   prior to surgery pt told TN she wants to d/c  to [facility]   1/24 - s/p  revision amputation stump right below-knee  s/p L aka  - May, 2021      Future Appointments   Date Time Provider Department Center   4/4/2022  2:30 PM Fawad Jameson MD [facility]        01/26/22 9289   Post-Acute Status   Post-Acute Authorization Other  (IP[facility])   Post-Acute Placement Status Pending payor review/awaiting authorization (if required)   Discharge Delays (!) Post-Acute Set-up   Discharge Plan   Discharge Plan A Rehab     "

## 2022-01-26 NOTE — SUBJECTIVE & OBJECTIVE
Interval History: Patient with severe pain of right BKA stump with shooting pains. No other complaints.     Review of Systems   Respiratory: Negative for shortness of breath.    Gastrointestinal: Negative for diarrhea, nausea and vomiting.   Musculoskeletal:        Severe pain in right leg BKA stump     Antibiotics (From admission, onward)            Start     Stop Route Frequency Ordered    01/21/22 2330  meropenem 1 g in sodium chloride 0.9 % 100 mL IVPB (ready to mix system)         -- IV Every 8 hours (non-standard times) 01/21/22 2222        Objective:     Vital Signs (Most Recent):  Temp: 98.7 °F (37.1 °C) (01/25/22 2348)  Pulse: (!) 111 (01/25/22 2348)  Resp: 20 (01/25/22 2348)  BP: (!) 122/59 (01/25/22 2348)  SpO2: (!) 92 % (01/25/22 2348) Vital Signs (24h Range):  Temp:  [97.8 °F (36.6 °C)-98.9 °F (37.2 °C)] 98.7 °F (37.1 °C)  Pulse:  [104-112] 111  Resp:  [16-20] 20  SpO2:  [92 %-100 %] 92 %  BP: (109-147)/(54-80) 122/59     Weight: 78.1 kg (172 lb 2.9 oz)  Body mass index is 28.65 kg/m².    Estimated Creatinine Clearance: 50.5 mL/min (based on SCr of 1.3 mg/dL).    Physical Exam  Cardiovascular:      Heart sounds: Normal heart sounds.   Pulmonary:      Breath sounds: Normal breath sounds.   Abdominal:      General: Bowel sounds are normal. There is no distension.      Palpations: Abdomen is soft.      Tenderness: There is no abdominal tenderness.   Musculoskeletal:      Right lower leg: No edema.      Left lower leg: No edema.      Comments: Right leg BKA stump bandaged; left AKA stump well healed with no lesions         Significant Labs:   Blood Culture:   Recent Labs   Lab 12/30/21  1412 01/02/22  1938 01/17/22  1548 01/19/22  0848   LABBLOO No growth after 5 days.  No growth after 5 days. No growth after 5 days.  No growth after 5 days. No growth after 5 days.  Gram stain salvatore bottle: Gram positive cocci in clusters resembling Staph   Results called to and read back by: Tita Tavarez RN  01/18/2022    19:36  Gram stain aer bottle: Gram positive cocci in clusters resembling Staph   Positive results previously called 01/19/2022  04:26  COAGULASE-NEGATIVE STAPHYLOCOCCUS SPECIES  Organism is a probable contaminant  * No growth after 5 days.  No growth after 5 days.     Lab Results   Component Value Date    WBC 7.40 01/22/2022    HGB 8.1 (L) 01/22/2022    HCT 27.1 (L) 01/22/2022     (H) 01/22/2022     01/22/2022     BMP  Lab Results   Component Value Date     01/22/2022    K 4.6 01/22/2022     01/22/2022    CO2 23 01/22/2022    BUN 46 (H) 01/22/2022    CREATININE 1.3 01/22/2022    CALCIUM 8.2 (L) 01/22/2022    ANIONGAP 8 01/22/2022    ESTGFRAFRICA 53 (A) 01/22/2022    EGFRNONAA 46 (A) 01/22/2022     Urine Culture:   Recent Labs   Lab 12/21/21  2204 01/03/22  1724   LABURIN No growth CANDIDA GLABRATA  > 100,000 cfu/ml  Treatment of asymptomatic candiduria is not recommended (except for   specific populations). Candida isolated in the urine typically   represents colonization. If an indwelling urinary catheter is present  it should be removed or replaced.  *     Urine Studies:   Recent Labs   Lab 01/03/22  1724   COLORU Yellow   APPEARANCEUA Cloudy*   PHUR 6.0   SPECGRAV 1.020   PROTEINUA 3+*   GLUCUA Trace*   KETONESU Negative   BILIRUBINUA Negative   OCCULTUA 2+*   NITRITE Negative   UROBILINOGEN Negative   LEUKOCYTESUR 3+*   RBCUA 12*   WBCUA >100*   BACTERIA Moderate*   SQUAMEPITHEL 14   HYALINECASTS 0     Wound Culture:   Recent Labs   Lab 10/14/21  1108 01/17/22  1627 01/20/22  1451   LABAERO KLEBSIELLA PNEUMONIAE ESBL  Moderate  * PSEUDOMONAS AERUGINOSA  Many  *  KLEBSIELLA PNEUMONIAE ESBL  Moderate  * PSEUDOMONAS AERUGINOSA  Few  *  KLEBSIELLA PNEUMONIAE ESBL  Few  *       Significant Imaging: no recent images

## 2022-01-26 NOTE — ASSESSMENT & PLAN NOTE
S/p right bka with Dr. Rojas on 12/28/21  Presents to ED with concerns for wound infection  BKA site with dehisced wound  Received zosyn x1  Vancomycin and cefazolin initiated, cultures pseudomonas.  Cefazolin changed to cefepime and Vanc.  General surgery consulted for possible debridement: check cultures , apply santyl , xeroform , 4 x 4 , kerlix , and ace bandage daily, iv antibiotics  ID consulted for recommendations on antibiotic therapy: vanc, cefepime.  Await final cultures. DC vancomycin on 1/24 and continue meropenem 1/24 pending final culture  plan 2 weeks of IV antibiotics past the R BKA stump revision surgery on 1/24/22. So stop date February 7, 2022.   Case Management trying to place patient   Need to get cbc and cmp at least weekly while on antibiotics  If needed can fax labs to Dr. Espino at [phone]   PT/OT: rehab

## 2022-01-26 NOTE — PROGRESS NOTES
PICC nurse in hospital, waiting for midline exchange for this patient, then will hang blood so no interruption in blood administration

## 2022-01-26 NOTE — PT/OT/SLP PROGRESS
Occupational Therapy   Treatment    Name: Suyapa Connelly  MRN: [id number]  Admitting Diagnosis:  Infected wound  2 Days Post-Op  Pre-op Diagnosis: S/P BKA (below knee amputation) unilateral, right [Z89.511] s/p Procedure(s):  REVISION-STUMP   The primary encounter diagnosis was S/P BKA (below knee amputation) unilateral, right. Diagnoses of Visit for wound check, Status post below knee amputation, unspecified laterality, Chest pain, Infected wound, and Decubitus ulcer of sacral region, unstageable were also pertinent to this visit.    Recommendations:     Discharge Recommendations: rehabilitation facility  Discharge Equipment Recommendations:  other (see comments) (TBD)  Barriers to discharge:  None    Assessment:     Suyapa Connelly is a 56 y.o. female with a medical diagnosis of Infected wound.  She presents with Performance deficits affecting function are weakness,impaired endurance,impaired self care skills,impaired functional mobilty,impaired balance,decreased coordination,decreased upper extremity function,decreased lower extremity function,decreased safety awareness,impaired sensation,pain,decreased ROM,impaired skin,edema,orthopedic precautions.     Rehab Prognosis:  Fair; patient would benefit from acute skilled OT services to address these deficits and reach maximum level of function.       Plan:     Patient to be seen 5 x/week to address the above listed problems via self-care/home management,therapeutic activities,therapeutic exercises  · Plan of Care Expires: 02/18/22  · Plan of Care Reviewed with: patient    Subjective     Pain/Comfort:  · Pain Rating 1:  (pt crying in pain)  · Location - Side 1: Right  · Location - Orientation 1: lower  · Location 1: leg (residual limb/R lateral aspect mostly)  · Pain Addressed 1: Pre-medicate for activity,Reposition,Distraction,Cessation of Activity,Nurse notified (deep breathing pain managament tech)  · Pain Rating Post-Intervention 1:  (not rated, still in  tears)    Objective:     Communicated with: nurse prior to session.  Patient found seated EOB with PT/wound care nurse/PT student with bed alarm,PICC line upon OT entry to room.    General Precautions: Standard, contact,diabetic,fall   Orthopedic Precautions: (R BKA, L AKA (old))   Braces: N/A  Respiratory Status: Room air     Occupational Performance:     Bed Mobility:    · Patient completed Sit to Supine with minimum assistance, with side rail and incrteasedd time,  due to guarded /RLE pain; RLE lifted woith pilow and jenise pad support.       [facility] 6 Click ADL: 15    Treatment & Education:  OT worked on UE resistive tband ex for  strengthening and posture while PT worked on trunk control and sitting balance. Pt performed 10 x 2 sets rows/lat pulls with vc for upright posture, emphasis on squeezing shld blades together,  Tucking chin, lifting chest with good-fair follow through. Pt. motivated but tearful/crying intermittingly because of pain in RLE and current medical condition (amputations). OT provided emotional support and deep breathing encouragement. Pt. was issued light resistive tputty for hand strengthening for gross , demonstrated indep return while seated EOB. See PT note for sitting balance and positioning on EOB. Pt issued built up foam handle for feeding utensil to increase  and control with feeding.    Patient left HOB elevated with all lines intact, call button in reach and PT and PT student presentEducation:      GOALS:   Multidisciplinary Problems     Occupational Therapy Goals        Problem: Occupational Therapy Goal    Goal Priority Disciplines Outcome Interventions   Occupational Therapy Goal     OT, PT/OT Ongoing, Progressing    Description: Goals to be met by: 2/18     Patient will increase functional independence with ADLs by performing:    UE Dressing with Stand-by Assistance.  LE Dressing with Minimal Assistance.  Grooming while seated with Modified [place].  Toilet transfer  to bedside commode with Moderate Assistance.  Upper extremity exercise program x10 reps per handout, with independence.                     Time Tracking:     OT Date of Treatment: 01/26/22  OT Start Time: 1437  OT Stop Time: 1506  OT Total Time (min): 29 min cotx with PT    Billable Minutes:Therapeutic Exercise 14  Total Time 14    OT/MACKENZIE: OT          1/26/2022

## 2022-01-26 NOTE — PROGRESS NOTES
[facility] Surg  Infectious Disease  Progress Note    Patient Name: Suyapa Connelly  MRN: [id number]  Admission Date: 1/17/2022  Length of Stay: 6 days  Attending Physician: Tawnya Montes*  Primary Care Provider: Magen Christensen MD    Isolation Status: Contact  Assessment/Plan:      * Infected wound  57 y/o female with PMHx CKD3, DMII, GERD, HLD, HTN, osteomyelitis, PVD, depression, and anxiety presents to ED with  with continue concern for wound infection of right BKA wound. BC 1/17 - 1 of 2 sets with coag negative staph - likely contaminant. Right knee wound 1/17 positive for pseudomonas and Klebsiella ESBL. Right leg wound culture 1/20 positive for pseudomonas and Klebsiella ESBL. Surgery revised right stump 1/24/22 but no new cultures obtained.     Final ID Plan:   Discussed with Surgery - plan 2 weeks of IV antibiotics past the R BKA stump revision surgery on 1/24/22. So stop date is planned for February 7, 2022.   Case Management trying to place patient   Need to get cbc and cmp at least weekly while on antibiotics  If needed can fax labs to Dr. Espino at [phone]  No need for [facility] follow up     [facility] will sign off - call if questions [phone].                   Anticipated Disposition: post acute care placement    Thank you for your consult. I will sign off. Please contact us if you have any additional questions.[phone]    Geno Sethi MD  Infectious Disease  [facility] Surg    Subjective:     Principal Problem:Infected wound    HPI: 57 y/o female with PMHx CKD3, DMII, GERD, HLD, HTN, osteomyelitis, PVD, depression, and anxiety presents to ED with  with continue concern for wound infection.  Patient underwent right BKA by Dr. Rojas on 12/28 then discharged to rehab facility on 1/10.  Rehab facility staff became concerned for possible wound infection and patient was sent back to ED on 1/11.  Lab work was obtained, and images of wound were reviewed with   Bob, who recommended continued outpatient management.  At discharge patient was prescribed Augmentin, which she has continued taking as instructed.   at bedside is concern for progressively worsening appearance of wound, along with continued drainage.  Patient reporting worsened pain today described as sharp, worse with touch, radiating towards right knee, severity 8/10.  No fevers, chills, nausea, vomiting, chest pain, cough, shortness of breath, abdominal pain, urinary or bowel complications      ID is consulted 1/19/22 for infection of BKA site with wound cxs growing presumptive pseudomonas  1/20/22 - new gram negative favio on culture in addition to the pseudomonas; continue vanco and cefepime  1/21/22 - the right knee culture from 1/17 positive for pseudomonas and Klebsiella ESBL - changed cefepime to meropenem and continued vancomycin  1/22/22 - right leg wound from 1/20/22 positive for pseudomonas and GNR  1/23/22 - right leg wound from 1/20/22 positive for pseudomonas and klebsiella ESBL; vancomycin stopped  1/24/22 - right BKA stump revision in OR - no cultures taken    Interval History: Patient with severe pain of right BKA stump with shooting pains. No other complaints.     Review of Systems   Respiratory: Negative for shortness of breath.    Gastrointestinal: Negative for diarrhea, nausea and vomiting.   Musculoskeletal:        Severe pain in right leg BKA stump     Antibiotics (From admission, onward)            Start     Stop Route Frequency Ordered    01/21/22 2330  meropenem 1 g in sodium chloride 0.9 % 100 mL IVPB (ready to mix system)         -- IV Every 8 hours (non-standard times) 01/21/22 2222        Objective:     Vital Signs (Most Recent):  Temp: 98.7 °F (37.1 °C) (01/25/22 2348)  Pulse: (!) 111 (01/25/22 2348)  Resp: 20 (01/25/22 2348)  BP: (!) 122/59 (01/25/22 2348)  SpO2: (!) 92 % (01/25/22 2348) Vital Signs (24h Range):  Temp:  [97.8 °F (36.6 °C)-98.9 °F (37.2 °C)] 98.7 °F (37.1  °C)  Pulse:  [104-112] 111  Resp:  [16-20] 20  SpO2:  [92 %-100 %] 92 %  BP: (109-147)/(54-80) 122/59     Weight: 78.1 kg (172 lb 2.9 oz)  Body mass index is 28.65 kg/m².    Estimated Creatinine Clearance: 50.5 mL/min (based on SCr of 1.3 mg/dL).    Physical Exam  Cardiovascular:      Heart sounds: Normal heart sounds.   Pulmonary:      Breath sounds: Normal breath sounds.   Abdominal:      General: Bowel sounds are normal. There is no distension.      Palpations: Abdomen is soft.      Tenderness: There is no abdominal tenderness.   Musculoskeletal:      Right lower leg: No edema.      Left lower leg: No edema.      Comments: Right leg BKA stump bandaged; left AKA stump well healed with no lesions         Significant Labs:   Blood Culture:   Recent Labs   Lab 12/30/21  1412 01/02/22  1938 01/17/22  1548 01/19/22  0848   LABBLOO No growth after 5 days.  No growth after 5 days. No growth after 5 days.  No growth after 5 days. No growth after 5 days.  Gram stain salvatore bottle: Gram positive cocci in clusters resembling Staph   Results called to and read back by: Tita Tavarez RN 01/18/2022    19:36  Gram stain aer bottle: Gram positive cocci in clusters resembling Staph   Positive results previously called 01/19/2022  04:26  COAGULASE-NEGATIVE STAPHYLOCOCCUS SPECIES  Organism is a probable contaminant  * No growth after 5 days.  No growth after 5 days.     Lab Results   Component Value Date    WBC 7.40 01/22/2022    HGB 8.1 (L) 01/22/2022    HCT 27.1 (L) 01/22/2022     (H) 01/22/2022     01/22/2022     BMP  Lab Results   Component Value Date     01/22/2022    K 4.6 01/22/2022     01/22/2022    CO2 23 01/22/2022    BUN 46 (H) 01/22/2022    CREATININE 1.3 01/22/2022    CALCIUM 8.2 (L) 01/22/2022    ANIONGAP 8 01/22/2022    ESTGFRAFRICA 53 (A) 01/22/2022    EGFRNONAA 46 (A) 01/22/2022     Urine Culture:   Recent Labs   Lab 12/21/21  2204 01/03/22  1724   LABURIN No growth CANDIDA  GLABRATA  > 100,000 cfu/ml  Treatment of asymptomatic candiduria is not recommended (except for   specific populations). Candida isolated in the urine typically   represents colonization. If an indwelling urinary catheter is present  it should be removed or replaced.  *     Urine Studies:   Recent Labs   Lab 01/03/22  1724   COLORU Yellow   APPEARANCEUA Cloudy*   PHUR 6.0   SPECGRAV 1.020   PROTEINUA 3+*   GLUCUA Trace*   KETONESU Negative   BILIRUBINUA Negative   OCCULTUA 2+*   NITRITE Negative   UROBILINOGEN Negative   LEUKOCYTESUR 3+*   RBCUA 12*   WBCUA >100*   BACTERIA Moderate*   SQUAMEPITHEL 14   HYALINECASTS 0     Wound Culture:   Recent Labs   Lab 10/14/21  1108 01/17/22  1627 01/20/22  1451   LABAERO KLEBSIELLA PNEUMONIAE ESBL  Moderate  * PSEUDOMONAS AERUGINOSA  Many  *  KLEBSIELLA PNEUMONIAE ESBL  Moderate  * PSEUDOMONAS AERUGINOSA  Few  *  KLEBSIELLA PNEUMONIAE ESBL  Few  *       Significant Imaging: no recent images

## 2022-01-26 NOTE — PROGRESS NOTES
"[place] - Med Surg  Adult Nutrition  Progress Note    SUMMARY       Recommendations    Recommendation:   1. Encourage intake of meals & supplements as tolerated.   2. Monitor weight/labs.   3. RD to continue to follow to monitor po intake    Goals:   Pt will tolerate diet with at least 50-75% intake at meals by RD follow up  Nutrition Goal Status: progressing towards goal  Communication of RD Recs: reviewed with RN    Assessment and Plan  * Infected wound  Contributing Nutrition Diagnosis  Increased energy/protein needs    Related to (etiology):   Wound healing    Signs and Symptoms (as evidenced by):   Infected amputation stump    Interventions:  Collaboration with other providers  Commercial beverage  Increased protein diet    Nutrition Diagnosis Status:   Continues      Malnutrition Assessment  Subcutaneous Fat Loss (Final Summary): well nourished  Muscle Loss Evaluation (Final Summary): well nourished       Reason for Assessment  Reason For Assessment: RD follow-up  Diagnosis:  (infected wound)  Relevant Medical History: GERD, HTN, hyperlipemia, DM, MI, PVD, CKD, heart cath, CABG, thrombectomy, L AKA, R BKA  General Information Comments: Pt on 2000 ADA diet with beneprotein and Boost Glucose.Pt with 25-50% intake at recent meals. Carlos 17- R stump wound, L buttocks wound, coccyx wound. s/p stump revision 1/24. L AKA, R BKA. NFPE completed 1/18-nourished. Weight stable.  Nutrition Discharge Planning: pt to d/c on ADA diet    Nutrition Risk Screen  Nutrition Risk Screen: large or nonhealing wound, burn or pressure injury    Nutrition/Diet History  Food Preferences: no Adventist or cultural food prefs identified  Spiritual, Cultural Beliefs, Yazidi Practices, Values that Affect Care: no  Factors Affecting Nutritional Intake: depression    Anthropometrics  Temp: 98.2 °F (36.8 °C)  Height Method: Stated  Height: 5' 5" (165.1 cm)  Height (inches): 65 in  Weight Method: Bed Scale  Weight: 76.2 kg (167 lb 15.9 " oz)  Weight (lb): 167.99 lb  Ideal Body Weight (IBW), Female: 125 lb  % Ideal Body Weight, Female (lb): 134.39 %  BMI (Calculated): 28  BMI Grade: 25 - 29.9 - overweight  Amputation %: 5.9,16  Total Amputation %: 21.9  Amputation Ideal Body Weight (IBW), Female (lb): 103.1 lb  Amputee BMI (kg/m2): 35.89 kg/m2     Lab/Procedures/Meds  Pertinent Labs Reviewed: reviewed  Pertinent Labs Comments: BUN 46H, Glu 249H, Ca 8.2L, Alb 1.6L  Pertinent Medications Reviewed: reviewed  Pertinent Medications Comments: Lasix, insulin    Estimated/Assessed Needs  Weight Used For Calorie Calculations: 76.2 kg (167 lb 15.9 oz)  Energy Calorie Requirements (kcal): 2621-6787 (25-30kcal/kg with amputation %)  Energy Need Method: Kcal/kg  Protein Requirements: 72g (1.2g/kg with amputation %)  Weight Used For Protein Calculations: 76.2 kg (167 lb 15.9 oz)  Estimated Fluid Requirement Method: RDA Method  RDA Method (mL): 1488     Nutrition Prescription Ordered  Current Diet Order: 2000 ADA  Oral Nutrition Supplement: beneprotein, Boost Glucose    Evaluation of Received Nutrient/Fluid Intake  I/O: 229/1  Energy Calories Required: meeting needs  Protein Required: meeting needs  Fluid Required: meeting needs  Comments: LBM 1/24  % Intake of Estimated Energy Needs: 25 - 50 %  % Meal Intake: 25 - 50 %    Nutrition Risk  Level of Risk/Frequency of Follow-up:  (2xweekly)     Monitor and Evaluation  Food and Nutrient Intake: food and beverage intake  Food and Nutrient Adminstration: diet order  Physical Activity and Function: nutrition-related ADLs and IADLs  Anthropometric Measurements: weight  Biochemical Data, Medical Tests and Procedures: electrolyte and renal panel  Nutrition-Focused Physical Findings: overall appearance     Nutrition Follow-Up  RD Follow-up?: Yes

## 2022-01-26 NOTE — PROGRESS NOTES
Wound care follow up:  Wound dressing to R BKA changed as directed after pt premedicated for procedural pain by nurse. Appreciate PT assistance. Notified Dr. Rojas of continued strike through drainage to R BKA dressing and continued oozing of blood from incision site.

## 2022-01-26 NOTE — PLAN OF CARE
Problem: Occupational Therapy Goal  Goal: Occupational Therapy Goal  Description: Goals to be met by: 2/18     Patient will increase functional independence with ADLs by performing:    UE Dressing with Stand-by Assistance.  LE Dressing with Minimal Assistance.  Grooming while seated with Modified [place].  Toilet transfer to bedside commode with Moderate Assistance.  Upper extremity exercise program x10 reps per handout, with independence.    Outcome: Ongoing, Progressing

## 2022-01-26 NOTE — PT/OT/SLP RE-EVAL
Physical Therapy Re-evaluation/Treatment    Patient Name:  Suyapa Connelly   MRN:  [id number]    Recommendations:     Discharge Recommendations:  rehabilitation facility   Discharge Equipment Recommendations:  (TBD)   Barriers to discharge: None    Assessment:     Suyapa Connelly is a 56 y.o. female admitted with a medical diagnosis of Infected wound.  She presents with the following impairments/functional limitations:  weakness,impaired endurance,impaired sensation,impaired self care skills,impaired functional mobilty,gait instability,impaired balance,decreased lower extremity function,decreased upper extremity function,decreased coordination,decreased safety awareness,pain,decreased ROM,impaired fine motor,impaired coordination,impaired skin,edema PT re-eval performed following R BKA revision 1/24/2022; PT goals remain appropriate; cont with POC; recommend IPR..    Rehab Prognosis:  Fair; patient would benefit from acute skilled PT services to address these deficits and reach maximum level of function.      Recent Surgery: Procedure(s) (LRB):  REVISION-STUMP (Right) 1 Day Post-Op    Plan:     During this hospitalization, patient to be seen 6 x/week to address the above listed problems via therapeutic activities,therapeutic exercises,neuromuscular re-education,wheelchair management/training  · Plan of Care Expires:  02/25/22   Plan of Care Reviewed with: patient    Subjective     Communicated with nurse prior to session.  Patient found HOB elevated with bed alarm,PICC line upon PT entry to room, agreeable to evaluation.      Chief Complaint: RLE pain  Patient comments/goals: my leg hurts  Pain/Comfort:  · Pain Rating 1: 8/10  · Location - Side 1: Right  · Location - Orientation 1: lower  · Location 1: leg (residual limb)  · Pain Addressed 1: Pre-medicate for activity,Reposition,Distraction,Cessation of Activity  · Pain Rating Post-Intervention 1:  (did not rate)    Patients cultural, spiritual, Jain  conflicts given the current situation: no      Objective:     Patient found with: bed alarm,PICC line     General Precautions: Standard, contact,diabetic,fall   Orthopedic Precautions: (R BKA, (old) L AKA)   Braces: N/A  Respiratory Status: Room air    Exams:  · Cognitive Exam:  Patient is oriented to Person, Place, Time, Situation and follows one step commands and patient drowsy from pain meds  · Gross Motor Coordination:  Impaired 2/2 pain  · Postural Exam:  Patient presented with the following abnormalities:    · -       Rounded shoulders  · -       Forward head  · -       Posterior pelvic tilt  · Skin Integrity/Edema:      · -       Skin integrity: R residual limb with bloody drainage outside of ace wrap and on jenise pad; wound care nurse assessed sacral breakdown during session and assisted wound care nurse with holding up residual limb while changing dressing  · RLE ROM: passively hip fl~65*, knee fl~30*, hip abd~30*  · RLE Strength: pt unable to actively flex hip to lift R residual limb off the bed, trace quad, hip abd/add~2-, hip ext 2-  · LLE ROM: WFL actively  · LLE Strength: 4- grossly    Functional Mobility:  · Bed Mobility:     · Rolling Right: minimum assistance and use of side rail, increased time 2/2 RLE pain  · Scooting: maximal assistance, of 2 persons to HOB with pt assisting by pulling with UEs on headboard  · Supine to Sit: moderate assistance and used jenise pad used to support RLE to assist of bed 2/2 pain- pt tearful/grimacing  · Sit to Supine: minimum assistance and use of side rail, jenise pad used to support RLE to assist onto bed 2/2 pain-pt tearful grimacing    AM-PAC 6 CLICK MOBILITY  Total Score:9       Therapeutic Activities and Exercises:   PT worked on sitting balance at EOB with CG/Sid 2/2 posterior LOB while seated with RLE supported on chair with pillow and BUE on bed while OT worked on oral, face and hair care; pt able to resist challenges to upright sitting; VCs for correcting  posture in leaning forward at hip along with head and chest lift; supine AAROM to RLE using jenise pad for support; educated on GS and performed and QS with trace contraction; assisted wound care nurse with holding RLE during limb dressing change.    Patient left HOB elevated with all lines intact, call button in reach, bed alarm on and nurse notified.    GOALS:   Multidisciplinary Problems     Physical Therapy Goals        Problem: Physical Therapy Goal    Goal Priority Disciplines Outcome Goal Variances Interventions   Physical Therapy Goal     PT, PT/OT Ongoing, Progressing     Description: Goals to be met by: 22     Patient will increase functional independence with mobility by performin. Supine <> sit with Stand-by Assistance  2. Bed to chair transfer with Minimal Assistance using scoot transfer or slideboard/beasy board  3. Wheelchair propulsion x 50 feet with Stand-by Assistance using bilateral upper extremities MET 2022    Updated  3. Wheelchair propulsion x 150 feet with Stand-by Assistance using bilateral upper extremities                     History:     Past Medical History:   Diagnosis Date    Anxiety     Chronic pain syndrome     CKD (chronic kidney disease), stage III     Depression     Diabetes mellitus     type 2    Diabetes mellitus, type 2     GERD (gastroesophageal reflux disease)     Hyperemesis 3/23/2021    Hyperlipemia     Hypertension     Hypokalemia 3/23/2021    Myocardial infarction 2010    minor-caused by stress per pt.    Osteomyelitis     Osteomyelitis of left foot 2021    PVD (peripheral vascular disease)     Vaginal delivery     x1       Past Surgical History:   Procedure Laterality Date    ABOVE-KNEE AMPUTATION Left 2021    Procedure: AMPUTATION, ABOVE KNEE;  Surgeon: Teddy Huber MD;  Location: [facility] OR [address] [place] [address];  Service: Vascular;  Laterality: Left;    Angiogram - Right Extremity Right 7/9/15    angiogram-left leg  10/6/15     ANGIOGRAPHY OF LOWER EXTREMITY Left 4/29/2021    Procedure: ANGIOGRAM, LOWER EXTREMITY;  Surgeon: Teddy Huber MD;  [facility];  Service: Vascular;  Laterality: Left;    BELOW KNEE AMPUTATION OF LOWER EXTREMITY Right 12/28/2021    Procedure: AMPUTATION, BELOW KNEE;  Surgeon: Kaitlyn Rojas MD;  Location: [facility] OR;  Service: General;  Laterality: Right;    CATHETERIZATION OF BOTH LEFT AND RIGHT HEART N/A 12/18/2019    Procedure: CATHETERIZATION, HEART, BOTH LEFT AND RIGHT;  Surgeon: Que Fernando III, MD;  Location: [facility] CATH LAB;  Service: Cardiology;  Laterality: N/A;    CORONARY ANGIOGRAPHY N/A 12/18/2019    Procedure: ANGIOGRAM, CORONARY ARTERY;  Surgeon: Que Fernando III, MD;  Location: [facility] CATH LAB;  Service: Cardiology;  Laterality: N/A;    CORONARY ANGIOGRAPHY INCLUDING BYPASS GRAFTS WITH CATHETERIZATION OF LEFT HEART N/A 7/28/2020    Procedure: ANGIOGRAM, CORONARY, INCLUDING BYPASS GRAFT, WITH LEFT HEART CATHETERIZATION, 9 am;  Surgeon: Rachel Easley MD;  Location: [facility] CATH LAB;  Service: Cardiology;  Laterality: N/A;    CORONARY ARTERY BYPASS GRAFT (CABG) N/A 1/14/2020    Procedure: CORONARY ARTERY BYPASS GRAFT (CABG) x 1     Off Pump;  Surgeon: Huang Altamirano MD;  Location: [address];  Service: Cardiovascular;  Laterality: N/A;    CREATION OF FEMORAL-TIBIAL ARTERY BYPASS Left 4/29/2021    Procedure: CREATION, BYPASS, ARTERIAL, FEMORAL TO ANTERIOR TIBIAL;  Surgeon: Teddy Huber MD;  [address];  Service: Vascular;  Laterality: Left;    CREATION OF FEMOROPOPLITEAL ARTERIAL BYPASS USING GRAFT Left 8/18/2020    Procedure: CREATION, BYPASS, ARTERIAL, FEMORAL TO POPLITEAL, USING GRAFT, LEFT LOWER EXTREMITY;  Surgeon: Teddy Huber MD;  Location: [facility];  Service: Vascular;  Laterality: Left;  REQUEST 7:15 A.M. START----COVID NEGATIVE ON 8/17  1ST CASE STARTE PER LEANA ON 8/7/2020 @ 942AM-LO  RN PREOP 8/12/2020    T/S-----CLEARED BY CARDS-------PENDING INSURANCE    DEBRIDEMENT OF FOOT Left 9/8/2020    Procedure: DEBRIDEMENT, FOOT;  Surgeon: Rosio Mayes DPM;  Location: [facility] OR;  Service: Podiatry;  Laterality: Left;  request neoxx .   RN Pre Op 9-4-2020, Covid negative on 9/5/20. C A    DEBRIDEMENT OF FOOT  3/4/2021    Procedure: DEBRIDEMENT, FOOT;  Surgeon: Teddy Huber MD;  Location: [facility] OR;  Service: Vascular;;    DEBRIDEMENT OF FOOT Left 3/9/2021    Procedure: DEBRIDEMENT, FOOT, bone biopsy;  Surgeon: Rosio Mayes DPM;  Location: [facility] OR;  Service: Podiatry;  Laterality: Left;  Request neoxx---COVID IN AM  REQUESTING NOON START  RN Phone Pre op.On Blood thinners Plavix and Eliquis.  Covid am of surgery. C A    DEBRIDEMENT OF FOOT Left 5/4/2021    Procedure: DEBRIDEMENT, FOOT;  Surgeon: Farooq Morley DPM;  Location: [facility] OR 2ND FLR;  Service: Podiatry;  Laterality: Left;    INSERTION OF TUNNELED CENTRAL VENOUS HEMODIALYSIS CATHETER N/A 1/27/2020    Procedure: Insertion, Catheter, Central Venous, Hemodialysis;  Surgeon: ESTEBAN Gomez III, MD;  Location: [facility] CATH LAB;  Service: Peripheral Vascular;  Laterality: N/A;    PERCUTANEOUS TRANSLUMINAL ANGIOPLASTY N/A 3/4/2021    Procedure: PTA (ANGIOPLASTY, PERCUTANEOUS, TRANSLUMINAL);  Surgeon: Teddy Huber MD;  Location: [facility] OR;  Service: Vascular;  Laterality: N/A;    REMOVAL OF ARTERIOVENOUS GRAFT Left 5/27/2021    Procedure: REMOVAL, GRAFT, LEFT LOWER EXTREMITY, WOUND EXPLORATION;  Surgeon: Teddy Huber MD;  Location: [facility] OR 2ND FLR;  Service: Vascular;  Laterality: Left;    REMOVAL OF NAIL OF DIGIT Left 3/9/2021    Procedure: REMOVAL, NAIL, DIGIT;  Surgeon: Rosio Mayes DPM;  Location: [facility] OR;  Service: Podiatry;  Laterality: Left;    THROMBECTOMY Left 3/4/2021    Procedure: THROMBECTOMY, LEFT LOWER EXTREMITY BYPASS GRAFT, ANGIOGRAM, POSSIBLE INTERVENTION, POSSIBLE LEFT LOWER EXTREMITY BYPASS;  Surgeon: Teddy BLANCA  MD Cecile;  Location: [facility] OR;  Service: Vascular;  Laterality: Left;    THROMBECTOMY Left 4/29/2021    Procedure: GRAFT THROMBECTOMY, LEFT LOWER EXTREMITY;  Surgeon: Teddy Huber MD;  Location: [facility] OR [address] [place] [address];  Service: Vascular;  Laterality: Left;  14.5 min  1179.85 mGy  341.01 Gycm2  240 ml dye    THROMBECTOMY  10/22/2021    Procedure: THROMBECTOMY;  Surgeon: Saad Arenas MD;  Location: [facility] CATH LAB/EP;  Service: Cardiology;;       Time Tracking:     PT Received On: 01/25/22  PT Start Time: 1335     PT Stop Time: 1412  PT Total Time (min): 37 min overlap with OT    Billable Minutes: Re-eval 10  and Therapeutic Activity 15      01/25/2022

## 2022-01-27 ENCOUNTER — PATIENT OUTREACH (OUTPATIENT)
Dept: ADMINISTRATIVE | Facility: OTHER | Age: 57
End: 2022-01-27
Payer: MEDICARE

## 2022-01-27 LAB
BASOPHILS # BLD AUTO: 0.09 K/UL (ref 0–0.2)
BASOPHILS NFR BLD: 0.8 % (ref 0–1.9)
DIFFERENTIAL METHOD: ABNORMAL
EOSINOPHIL # BLD AUTO: 0.2 K/UL (ref 0–0.5)
EOSINOPHIL NFR BLD: 2.2 % (ref 0–8)
ERYTHROCYTE [DISTWIDTH] IN BLOOD BY AUTOMATED COUNT: 15.8 % (ref 11.5–14.5)
HCT VFR BLD AUTO: 24 % (ref 37–48.5)
HGB BLD-MCNC: 7.6 G/DL (ref 12–16)
IMM GRANULOCYTES # BLD AUTO: 0.02 K/UL (ref 0–0.04)
IMM GRANULOCYTES NFR BLD AUTO: 0.2 % (ref 0–0.5)
LYMPHOCYTES # BLD AUTO: 1.4 K/UL (ref 1–4.8)
LYMPHOCYTES NFR BLD: 13.2 % (ref 18–48)
MCH RBC QN AUTO: 30.3 PG (ref 27–31)
MCHC RBC AUTO-ENTMCNC: 31.7 G/DL (ref 32–36)
MCV RBC AUTO: 96 FL (ref 82–98)
MONOCYTES # BLD AUTO: 1.1 K/UL (ref 0.3–1)
MONOCYTES NFR BLD: 10.1 % (ref 4–15)
NEUTROPHILS # BLD AUTO: 7.9 K/UL (ref 1.8–7.7)
NEUTROPHILS NFR BLD: 73.5 % (ref 38–73)
NRBC BLD-RTO: 0 /100 WBC
PLATELET # BLD AUTO: 346 K/UL (ref 150–450)
PMV BLD AUTO: 10.9 FL (ref 9.2–12.9)
POCT GLUCOSE: 113 MG/DL (ref 70–110)
POCT GLUCOSE: 164 MG/DL (ref 70–110)
POCT GLUCOSE: 305 MG/DL (ref 70–110)
POCT GLUCOSE: 98 MG/DL (ref 70–110)
RBC # BLD AUTO: 2.51 M/UL (ref 4–5.4)
WBC # BLD AUTO: 10.77 K/UL (ref 3.9–12.7)

## 2022-01-27 PROCEDURE — 94761 N-INVAS EAR/PLS OXIMETRY MLT: CPT | Mod: HCNC

## 2022-01-27 PROCEDURE — 27000207 HC ISOLATION: Mod: HCNC

## 2022-01-27 PROCEDURE — 11000001 HC ACUTE MED/SURG PRIVATE ROOM: Mod: HCNC

## 2022-01-27 PROCEDURE — 97110 THERAPEUTIC EXERCISES: CPT | Mod: HCNC

## 2022-01-27 PROCEDURE — 25000003 PHARM REV CODE 250: Mod: HCNC

## 2022-01-27 PROCEDURE — 25000003 PHARM REV CODE 250: Mod: HCNC | Performed by: SURGERY

## 2022-01-27 PROCEDURE — 97530 THERAPEUTIC ACTIVITIES: CPT | Mod: HCNC

## 2022-01-27 PROCEDURE — 36415 COLL VENOUS BLD VENIPUNCTURE: CPT | Mod: HCNC | Performed by: FAMILY MEDICINE

## 2022-01-27 PROCEDURE — 63600175 PHARM REV CODE 636 W HCPCS: Mod: HCNC | Performed by: FAMILY MEDICINE

## 2022-01-27 PROCEDURE — 25000003 PHARM REV CODE 250: Mod: HCNC | Performed by: FAMILY MEDICINE

## 2022-01-27 PROCEDURE — 85025 COMPLETE CBC W/AUTO DIFF WBC: CPT | Mod: HCNC | Performed by: FAMILY MEDICINE

## 2022-01-27 RX ADMIN — FUROSEMIDE 40 MG: 40 TABLET ORAL at 08:01

## 2022-01-27 RX ADMIN — CLOPIDOGREL 75 MG: 75 TABLET, FILM COATED ORAL at 08:01

## 2022-01-27 RX ADMIN — APIXABAN 5 MG: 5 TABLET, FILM COATED ORAL at 08:01

## 2022-01-27 RX ADMIN — OXYCODONE AND ACETAMINOPHEN 1 TABLET: 10; 325 TABLET ORAL at 03:01

## 2022-01-27 RX ADMIN — MEROPENEM 1 G: 1 INJECTION, POWDER, FOR SOLUTION INTRAVENOUS at 08:01

## 2022-01-27 RX ADMIN — OXYCODONE AND ACETAMINOPHEN 1 TABLET: 10; 325 TABLET ORAL at 12:01

## 2022-01-27 RX ADMIN — OXYCODONE AND ACETAMINOPHEN 1 TABLET: 10; 325 TABLET ORAL at 08:01

## 2022-01-27 RX ADMIN — INSULIN ASPART 4 UNITS: 100 INJECTION, SOLUTION INTRAVENOUS; SUBCUTANEOUS at 08:01

## 2022-01-27 RX ADMIN — HYDROMORPHONE HYDROCHLORIDE 0.5 MG: 1 INJECTION, SOLUTION INTRAMUSCULAR; INTRAVENOUS; SUBCUTANEOUS at 08:01

## 2022-01-27 RX ADMIN — MIRTAZAPINE 15 MG: 7.5 TABLET ORAL at 08:01

## 2022-01-27 RX ADMIN — PREGABALIN 150 MG: 75 CAPSULE ORAL at 04:01

## 2022-01-27 RX ADMIN — SCOPALAMINE 1 PATCH: 1 PATCH, EXTENDED RELEASE TRANSDERMAL at 08:01

## 2022-01-27 RX ADMIN — PREGABALIN 150 MG: 75 CAPSULE ORAL at 08:01

## 2022-01-27 RX ADMIN — SODIUM BICARBONATE 650 MG: 650 TABLET ORAL at 08:01

## 2022-01-27 RX ADMIN — FUROSEMIDE 40 MG: 40 TABLET ORAL at 04:01

## 2022-01-27 RX ADMIN — INSULIN ASPART 5 UNITS: 100 INJECTION, SOLUTION INTRAVENOUS; SUBCUTANEOUS at 04:01

## 2022-01-27 RX ADMIN — ATORVASTATIN CALCIUM 80 MG: 40 TABLET, FILM COATED ORAL at 08:01

## 2022-01-27 RX ADMIN — ALLOPURINOL 100 MG: 100 TABLET ORAL at 08:01

## 2022-01-27 RX ADMIN — SERTRALINE HYDROCHLORIDE 50 MG: 50 TABLET ORAL at 08:01

## 2022-01-27 RX ADMIN — INSULIN ASPART 5 UNITS: 100 INJECTION, SOLUTION INTRAVENOUS; SUBCUTANEOUS at 12:01

## 2022-01-27 NOTE — SUBJECTIVE & OBJECTIVE
Interval History:  Awake and alert, acute events overnight, s/p debridement right BKA 1/24  Appreciate ID recs continue  IV antibiotics x 2 weeks of past the R BKA stump revision surgery on 1/24/22. (stop date  February 7, 2022.   S/p 1U prbc - post transfusion pending    Review of Systems   Constitutional: Negative for chills, diaphoresis, fatigue and fever.   HENT: Negative for hearing loss and sore throat.    Respiratory: Negative for cough and shortness of breath.    Cardiovascular: Negative for chest pain and leg swelling.   Gastrointestinal: Negative for abdominal pain, diarrhea, nausea and vomiting.   Genitourinary: Negative for difficulty urinating and flank pain.   Musculoskeletal: Negative for back pain.   Skin: Positive for wound. Negative for rash.   Neurological: Negative for dizziness, weakness and headaches.   Psychiatric/Behavioral: Negative for agitation and confusion.     Objective:     Vital Signs (Most Recent):  Temp: 98 °F (36.7 °C) (01/27/22 1149)  Pulse: 100 (01/27/22 1149)  Resp: 18 (01/27/22 1253)  BP: (!) 110/51 (01/27/22 1149)  SpO2: 96 % (01/27/22 0833) Vital Signs (24h Range):  Temp:  [98 °F (36.7 °C)-98.7 °F (37.1 °C)] 98 °F (36.7 °C)  Pulse:  [] 100  Resp:  [16-20] 18  SpO2:  [96 %-100 %] 96 %  BP: (107-140)/(50-93) 110/51     Weight: 70.5 kg (155 lb 6.8 oz)  Body mass index is 25.86 kg/m².    Intake/Output Summary (Last 24 hours) at 1/27/2022 1420  Last data filed at 1/26/2022 2130  Gross per 24 hour   Intake 347.5 ml   Output --   Net 347.5 ml      Physical Exam  Vitals and nursing note reviewed.   Constitutional:       General: She is not in acute distress.     Appearance: She is obese. She is not ill-appearing.   HENT:      Head: Normocephalic and atraumatic.   Cardiovascular:      Rate and Rhythm: Normal rate and regular rhythm.      Pulses: Normal pulses.      Heart sounds: Normal heart sounds.   Pulmonary:      Effort: Pulmonary effort is normal.   Abdominal:      General:  Bowel sounds are normal.      Palpations: Abdomen is soft.   Musculoskeletal:         General: No swelling.      Cervical back: Normal range of motion and neck supple.      Right Lower Extremity: Right leg is amputated below knee.      Left Lower Extremity: Left leg is amputated below knee.   Feet:      Right foot:      Skin integrity: No erythema or warmth.      Comments: S/p right BKA 12/28.   Wound dressing intact  Skin:     General: Skin is warm and dry.      Capillary Refill: Capillary refill takes less than 2 seconds.   Neurological:      General: No focal deficit present.      Mental Status: She is alert and oriented to person, place, and time.   Psychiatric:         Mood and Affect: Mood normal.         Behavior: Behavior normal. Behavior is cooperative.         Significant Labs: All pertinent labs within the past 24 hours have been reviewed.    Significant Imaging: I have reviewed all pertinent imaging results/findings within the past 24 hours.

## 2022-01-27 NOTE — NURSING
Pt. tolerated blood transfusion well and received 1 unit of blood. Administered scheduled meds per MAR. Brief changed 3x during shift. While the Rn and PCT were changing and cleaning the pt., the spouse came to observe the cleaning techniques and began to spread the pt.'s labia un-gloved, with dirty hands. RN educated spouse that the pt. is on contact precautions and gloves should be worn at all times to help lower the risk for infections. Safety precautions maintained.

## 2022-01-27 NOTE — PT/OT/SLP PROGRESS
Physical Therapy Treatment    Patient Name:  Suyapa Connelly   MRN:  [id number]    Recommendations:     Discharge Recommendations:  rehabilitation facility   Discharge Equipment Recommendations:  (TBD)   Barriers to discharge: None    Assessment:     Suyapa Connelly is a 56 y.o. female admitted with a medical diagnosis of Infected wound.  She presents with the following impairments/functional limitations:  weakness,impaired endurance,impaired self care skills,impaired functional mobilty,gait instability,impaired balance,decreased lower extremity function,decreased upper extremity function,decreased coordination,decreased safety awareness,impaired sensation,pain,decreased ROM,impaired skin,edema,orthopedic precautions Patient progressing toward goals; tolerated increased time sitting EOB.    Rehab Prognosis: Fair; patient would benefit from acute skilled PT services to address these deficits and reach maximum level of function.    Recent Surgery: Procedure(s) (LRB):  REVISION-STUMP (Right) 2 Days Post-Op    Plan:     During this hospitalization, patient to be seen 6 x/week to address the identified rehab impairments via therapeutic activities,therapeutic exercises,neuromuscular re-education,wheelchair management/training and progress toward the following goals:    · Plan of Care Expires:  02/25/22    Subjective     Pain/Comfort:  Pain Rating 1:  (pt crying in pain)  Location - Side 1: Right  Location - Orientation 1: lower  Location 1: leg (Residual limb-lateral aspect mainly)  Pain Addressed 1: Pre-medicate for activity,Reposition,Distraction,Cessation of Activity (deep breathing management/ attempt at imagery)  Pain Rating Post-Intervention 1:  (not rated but pt in tears)      Objective:     Communicated with nurse prior to session.  Patient found HOB elevated with bed alarm,PICC line upon PT entry to room.     General Precautions: Standard, contact,diabetic,fall   Orthopedic Precautions: (R BKA, (old) L AKA)    Braces: N/A  Respiratory Status: Room air     Functional Mobility:  · Bed Mobility:     · Rolling Right: minimum assistance and use of side rail with increased time and VCs for technique  · Scooting: maximal assistance, of 2 persons and pt using BUEs to pull on side rails then headboard; scooting to EOB with mod/maxA  · Supine to Sit: moderate assistance and use of pillow to support R residual limb 2/2 pain with increased time and use of side rail  · Sit to Supine: minimum assistance and use of side rail, support for R residual limb with pillow 2/2 pain       AM-PAC 6 CLICK MOBILITY  Turning over in bed (including adjusting bedclothes, sheets and blankets)?: 3  Sitting down on and standing up from a chair with arms (e.g., wheelchair, bedside commode, etc.): 1  Moving from lying on back to sitting on the side of the bed?: 2  Moving to and from a bed to a chair (including a wheelchair)?: 1  Need to walk in hospital room?: 1  Climbing 3-5 steps with a railing?: 1  Basic Mobility Total Score: 9       Therapeutic Activities and Exercises:   Assisted pt with holding RLE for wound care nurse to change dressing; pt transferred sup to sit EOB with modA, use of side rail and increased time to complete; R residual limb supported on pillow in chair; pt performed seated postural exercises/sittng balance- UE reaches to varying targets; VCs/TCs for head/chest lift and forward fl at hips when slightly leaning posteriorly, UE ex with OT and supine RLE AAROM ex while PT worked on trunk control/sit balance with VCs/TCs for maintaining midline and to counteract FHP, round shlds and increased trunk fl- pt motivated but tearful and crying intermittently 2/2 pain though pt had been pre medicated; provided emotional support and alternative pt management with deep pursed lip breathing; see OT notes for details of UE ex; pt returned to supine as described above and transferred to [facility] as described above; pt assisted with hip fl and knee ext  of R residual limb with support of pillow x 6 reps, QS x 5, hip abd/add x2, GS x 3; performed ex slowly and with VCs for breathing technique; positioned pt for comfort.    Patient left HOB elevated with all lines intact, call button in reach, bed alarm on and nurse notified..    GOALS:   Multidisciplinary Problems     Physical Therapy Goals        Problem: Physical Therapy Goal    Goal Priority Disciplines Outcome Goal Variances Interventions   Physical Therapy Goal     PT, PT/OT Ongoing, Progressing     Description: Goals to be met by: 22     Patient will increase functional independence with mobility by performin. Supine <> sit with Stand-by Assistance  2. Bed to chair transfer with Minimal Assistance using scoot transfer or slideboard/beasy board  3. Wheelchair propulsion x 50 feet with Stand-by Assistance using bilateral upper extremities MET 2022    Updated  3. Wheelchair propulsion x 150 feet with Stand-by Assistance using bilateral upper extremities                     Time Tracking:     PT Received On: 22  PT Start Time: 1350     PT Stop Time: 1510  PT Total Time (min): 80 min overlap with PT (assisted wound care nurse ~20 min)    Billable Minutes: Therapeutic Activity 35 and Therapeutic Exercise 11       PT/PTA: PT     PTA Visit Number: 0     2022

## 2022-01-27 NOTE — PLAN OF CARE
Problem: Occupational Therapy Goal  Goal: Occupational Therapy Goal  Description: Goals to be met by: 2/18     Patient will increase functional independence with ADLs by performing:    UE Dressing with Stand-by Assistance.  LE Dressing with Minimal Assistance.  Grooming while seated with [place].  Toilet transfer to bedside commode with Moderate Assistance.  Upper extremity exercise program x10 reps per handout, with independence.    Outcome: Ongoing, Progressing

## 2022-01-27 NOTE — PT/OT/SLP PROGRESS
Occupational Therapy   Co-Treatment w/ PT    Name: Suyapa Connelly  MRN: [id number]  Admitting Diagnosis:  Infected wound  3 Days Post-Op  Pre-op Diagnosis: S/P BKA (below knee amputation) unilateral, right [Z89.511] s/p Procedure(s):  REVISION-STUMP   The primary encounter diagnosis was S/P BKA (below knee amputation) unilateral, right. Diagnoses of Visit for wound check, Status post below knee amputation, unspecified laterality, Chest pain, Infected wound, and Decubitus ulcer of sacral region, unstageable were also pertinent to this visit.    Recommendations:     Discharge Recommendations: rehabilitation facility  Discharge Equipment Recommendations:  other (see comments) (TBD)  Barriers to discharge:  None    Assessment:     Suyapa Connelly is a 56 y.o. female with a medical diagnosis of Infected wound.  She presents with Performance deficits affecting function are weakness,impaired endurance,impaired self care skills,impaired functional mobilty,gait instability,impaired balance,impaired cognition,decreased coordination,decreased upper extremity function,decreased lower extremity function,decreased safety awareness,pain,decreased ROM,impaired skin,edema,orthopedic precautions.     Pt. progressing with OOB tranfser to wc, max A x 2  using Beasy Board/wooden slide board. Pt. sat up in wc 1.5 hrs, propelled using BUE 80' x 2 with SBA and vc. Pt. limited by RLE pain, weakness, some confusion and drowsiness from pain meds. Continue with OT POC. s.     Rehab Prognosis:  Good and Fair; patient would benefit from acute skilled OT services to address these deficits and reach maximum level of function.       Plan:     Patient to be seen 5 x/week to address the above listed problems via self-care/home management,therapeutic activities,therapeutic exercises  · Plan of Care Expires: 02/18/22  · Plan of Care Reviewed with: patient    Subjective     Pain/Comfort:  Pain Rating 1:  (not raed, facial grimacing, gently rubing  /stroking  RLE, crying intermittingly in pain)  Location - Side 1: Right  Location - Orientation 1: lower  Location 1: leg (residual limb, also endorses phantom pain in limb)  Pain Addressed 1: Pre-medicate for activity,Reposition,Distraction,Cessation of Activity,Nurse notified  Pain Rating Post-Intervention 1:  (pt intermittent, did not rate)    Objective:     Communicated with: nurse prior to session.  Patient found HOB elevated with bed alarm,PICC line upon OT entry to room.    General Precautions: Standard, fall,contact,diabetic   Orthopedic Precautions: ((R) BKA, L (AKA) old)   Braces: N/A  Respiratory Status: Room air     Occupational Performance:     Bed Mobility:    · Patient completed Rolling/Turning to Left with  minimum assistance, with side rail and for RLE assist, increased time and effort  · Patient completed Scooting/Bridging with minimum assistance  · Patient completed Supine to Sit with minimum assistance, with side rail and RLE asist; CGA at trunk, increased time and effort; limited by RLE pain  · Patient completed Sit to Supine with minimum assistance and for RLE assist     Functional Mobility/Transfers:  · Patient completed Bed <> Chair Transfer using beasy board to wc, wooden slide board wc>bed (unleveled surface) technique with maximal assistance and of 2 persons with slide board  · Functional Mobility: Pt. propelled wc 80' x 2 with SBA, vc to increase attention to avoid running into objects on left side, make wider turns when approaching door entrance and turing corners in hallway; 2 rest breaks needed, vc not to use tires to propel just wc rims.    Activities of Daily Living:  · Grooming: pt already brushed teeth and washed face prior to OT visit.   · Toileting:   no urge to urinate.      [facility] 6 Click ADL: 15    Treatment & Education:  Therapy goals for today explained to pt., pt. motivated to participate though affect flat, mood down, pt crying intertermitttingly because of RLE pain.  Pt.  assisted with bed>wc Max A x 2 person assist using Beasy board and draw sheet and 3 scoots.  Max A x 2 to reposition hips to midline in wc and to adjust pillow cushion under buttocks - pt performed wc push up using BUE to assist and able to un-weight buttocks but not clear off chair seat. Pt. remained sitting [facility] to eat lunch, using built up foam handle on utensil. OT returned ~30 min later to continue with tx  Pt. performed BUE strenthening ex.10 reps x 4 sets using mod resistive tband ex: rows with emphasis on scapula squeezes, maintaining form with elbows flexed and shld adducted for 10 x 2 sets; elbows flexed and shld abducted  when pulling tband 10 reps x 2 sets and triceps/scpula protraction/shld flexion 10 x 2 sets from wc level (tband anchored to back of wc. Pt. needed vc's to redirect 2/2 increased drowsiness and some confusion from pain meds. Pt. propelled wc using BUE in room, hallway to window to get some sun and enjoy the view 80' x 2 trials; pt. took 2 brief rests and needed vc to increase attention to obstacles, widening turns to pass through door and turn corners. Pt. returned to bed Max Ax 2 using wooden slide board, sit>supine min A fro RLE, slow, increased time 2/2 pain.      Patient left left sidelying with all lines intact, call button in reach and bed alarm onEducation:      GOALS:   Multidisciplinary Problems     Occupational Therapy Goals        Problem: Occupational Therapy Goal    Goal Priority Disciplines Outcome Interventions   Occupational Therapy Goal     OT, PT/OT Ongoing, Progressing    Description: Goals to be met by: 2/18     Patient will increase functional independence with ADLs by performing:    UE Dressing with Stand-by Assistance.  LE Dressing with Minimal Assistance.  Grooming while seated with Modified [place].  Toilet transfer to bedside commode with Moderate Assistance.  Upper extremity exercise program x10 reps per handout, with independence.                     Time  Tracking:     OT Date of Treatment: 01/27/22  OT Start Time: 1218 (1327)  OT Stop Time: 1300 (1419)  OT Total Time (min): 42 min + 52=94 cotx with PT    Billable Minutes:Therapeutic Activity 25  Therapeutic Exercise 22  Total Time 47    OT/MACKENZIE: OT          1/27/2022

## 2022-01-27 NOTE — PROGRESS NOTES
[facility] Medicine  Progress Note    Patient Name: Suyapa Connelly  MRN: [id number]  Patient Class: IP- Inpatient   Admission Date: 1/17/2022  Length of Stay: 7 days  Attending Physician: Tawnya Montes*  Primary Care Provider: Magen Christensen MD        Subjective:     Principal Problem:Infected wound        HPI:  Suyapa Connelly is a 55 y/o female with PMHx CKD3, DMII, GERD, HLD, HTN, osteomyelitis, PVD, depression, and anxiety presents to ED with  with continue concern for wound infection.  Patient underwent right BKA by Dr. Rojas on 12/28 then discharged to rehab facility on 1/10.  Rehab facility staff became concerned for possible wound infection and patient was sent back to ED on 1/11.  Lab work was obtained, and images of wound were reviewed with Dr. Rojas, who recommended continued outpatient management.  At discharge patient was prescribed Augmentin, which she has continued taking as instructed.   at bedside is concern for progressively worsening appearance of wound, along with continued drainage.  Patient reporting worsened pain today described as sharp, worse with touch, radiating towards right knee, severity 8/10.  No fevers, chills, nausea, vomiting, chest pain, cough, shortness of breath, abdominal pain, urinary or bowel complications.  She does admit to not taking her BP medication today.  No other acute complaints at this time. Will admit to hospital medicine for observation services. General surgery consulted.       Overview/Hospital Course:  Pt placed in observation for evaluation of wound infection.  Pt s/p R BKA with Dr. Rojas on 12/28/21.  Vanc and cefazolin initiated.  Wound culture presumptive pseudomonas species cefazolin discontinued and changed to cefepime.  UC candida and BC 1 of 2 bottles coag-neg staph suspected contaminant.  Gen Surg consulted and recommended santyl, xeroform, 4 x 4, kerlix and ace bandage daily.  Wound culture pseudomonas  sensitive to cefepime, GNR with I&S pending.    PT/OT recommended rehab facility.  WC consulted and unstageable sacral decub.  Recommended waffle overlay and triad ointment to L buttocks BID and prn incontinent episode.      Interval History:  Awake and alert, acute events overnight, s/p debridement right BKA 1/24  Appreciate ID recs continue  IV antibiotics x 2 weeks of past the R BKA stump revision surgery on 1/24/22. (stop date  February 7, 2022.   S/p 1U prbc - post transfusion pending    Review of Systems   Constitutional: Negative for chills, diaphoresis, fatigue and fever.   HENT: Negative for hearing loss and sore throat.    Respiratory: Negative for cough and shortness of breath.    Cardiovascular: Negative for chest pain and leg swelling.   Gastrointestinal: Negative for abdominal pain, diarrhea, nausea and vomiting.   Genitourinary: Negative for difficulty urinating and flank pain.   Musculoskeletal: Negative for back pain.   Skin: Positive for wound. Negative for rash.   Neurological: Negative for dizziness, weakness and headaches.   Psychiatric/Behavioral: Negative for agitation and confusion.     Objective:     Vital Signs (Most Recent):  Temp: 98 °F (36.7 °C) (01/27/22 1149)  Pulse: 100 (01/27/22 1149)  Resp: 18 (01/27/22 1253)  BP: (!) 110/51 (01/27/22 1149)  SpO2: 96 % (01/27/22 0833) Vital Signs (24h Range):  Temp:  [98 °F (36.7 °C)-98.7 °F (37.1 °C)] 98 °F (36.7 °C)  Pulse:  [] 100  Resp:  [16-20] 18  SpO2:  [96 %-100 %] 96 %  BP: (107-140)/(50-93) 110/51     Weight: 70.5 kg (155 lb 6.8 oz)  Body mass index is 25.86 kg/m².    Intake/Output Summary (Last 24 hours) at 1/27/2022 1420  Last data filed at 1/26/2022 2130  Gross per 24 hour   Intake 347.5 ml   Output --   Net 347.5 ml      Physical Exam  Vitals and nursing note reviewed.   Constitutional:       General: She is not in acute distress.     Appearance: She is obese. She is not ill-appearing.   HENT:      Head: Normocephalic and  atraumatic.   Cardiovascular:      Rate and Rhythm: Normal rate and regular rhythm.      Pulses: Normal pulses.      Heart sounds: Normal heart sounds.   Pulmonary:      Effort: Pulmonary effort is normal.   Abdominal:      General: Bowel sounds are normal.      Palpations: Abdomen is soft.   Musculoskeletal:         General: No swelling.      Cervical back: Normal range of motion and neck supple.      Right Lower Extremity: Right leg is amputated below knee.      Left Lower Extremity: Left leg is amputated below knee.   Feet:      Right foot:      Skin integrity: No erythema or warmth.      Comments: S/p right BKA 12/28.   Wound dressing intact  Skin:     General: Skin is warm and dry.      Capillary Refill: Capillary refill takes less than 2 seconds.   Neurological:      General: No focal deficit present.      Mental Status: She is alert and oriented to person, place, and time.   Psychiatric:         Mood and Affect: Mood normal.         Behavior: Behavior normal. Behavior is cooperative.         Significant Labs: All pertinent labs within the past 24 hours have been reviewed.    Significant Imaging: I have reviewed all pertinent imaging results/findings within the past 24 hours.      Assessment/Plan:      * Infected wound  S/p right bka with Dr. Rojas on 12/28/21  Presents to ED with concerns for wound infection  BKA site with dehisced wound  Received zosyn x1  Vancomycin and cefazolin initiated, cultures pseudomonas.  Cefazolin changed to cefepime and Vanc.  General surgery consulted for possible debridement: check cultures , apply santyl , xeroform , 4 x 4 , kerlix , and ace bandage daily, iv antibiotics  ID consulted for recommendations on antibiotic therapy: vanc, cefepime.  Await final cultures. DC vancomycin on 1/24 and continue meropenem 1/24 pending final culture  plan 2 weeks of IV antibiotics past the R BKA stump revision surgery on 1/24/22. So stop date February 7, 2022.   Case Management trying to  place patient   Need to get cbc and cmp at least weekly while on antibiotics  If needed can fax labs to Dr. Espino at [phone]   PT/OT: rehab      Decubitus ulcer of sacral region, unstageable  Wound Care assistance appreciated  Recommended waffle overlay and triad ointment to L buttocks BID and prn incontinent episode.    S/P BKA (below knee amputation) unilateral, right  See above      Phantom limb syndrome with pain  Resume home lyrica  Norco q4hr prn     Chronic anemia  Stable and at baseline      Type 2 diabetes mellitus with diabetic nephropathy, with long-term current use of insulin  Hemoglobin A1C   Date Value Ref Range Status   12/03/2021 9.6 (H) 4.0 - 5.6 % Final     Comment:     ADA Screening Guidelines:  5.7-6.4%  Consistent with prediabetes  >or=6.5%  Consistent with diabetes    High levels of fetal hemoglobin interfere with the HbA1C  assay. Heterozygous hemoglobin variants (HbS, HgC, etc)do  not significantly interfere with this assay.   However, presence of multiple variants may affect accuracy.                                       Patient's FSGs are uncontrolled due to hyperglycemia on current medication regimen.  - home regimen includes detemir, aspart  - hold home oral medications  - increased scheduled doses: detemir 10U qhs, aspart 2U tid plus scale will increase to 5U tid. Controlled.  - LDSSI with ACCUcheck ACHS  - Diabetic diet  -Hypoglycemia protocol PRN        Paroxysmal atrial fibrillation  Patient with paroxymal afib  Resumed home OAC, rate controlled  Monitor on telemetry          Mixed hyperlipidemia  Last LDL was   Lab Results   Component Value Date    LDLCALC 177.6 (H) 12/03/2021      Patient is chronically on statin.will continue for now.             MDD (major depressive disorder), recurrent episode, moderate  Resumed zoloft, stable      Peripheral vascular disease  Resume statin         VTE Risk Mitigation (From admission, onward)         Ordered     apixaban tablet 5 mg  2 times  daily         01/17/22 1920     IP VTE HIGH RISK PATIENT  Once         01/17/22 1658     Place sequential compression device  Until discontinued         01/17/22 1658                Discharge Planning   DEVAN:      Code Status: Full Code   Is the patient medically ready for discharge?:     Reason for patient still in hospital (select all that apply): Pending disposition  Discharge Plan A: Rehab   Discharge Delays: (!) Post-Acute Set-up              Tawnya Montes MD  Department of Hospital Medicine   [facility]

## 2022-01-27 NOTE — DISCHARGE SUMMARY
[facility] Medicine  Discharge Summary      Patient Name: Suyapa Connelly  MRN: [id number]  Patient Class: IP- Inpatient  Admission Date: 12/21/2021  Hospital Length of Stay: 20 days  Discharge Date and Time: 1/10/2022  2:51 PM  Attending Physician: No att. providers found   Discharging Provider: Ruben Flood MD  Primary Care Provider: Magen Christensen MD      HPI:   Suyapa Connelly is a 55 yo female with a pmh of DM2, PVD, osteomyelitis, right AKA, CKD3, CAD s/p CABG, afib on eliquis. She presented with right foot pain x 1 month. She has an ulcer to her right heel. Presented with dressing to right heel which was removed and had purulent foul smelling drainage noted. The dressing was not changed in 2 months since she last saw Dr. Askew on 10/22/21 for arthrectomy, thrombectomy, and PTA with DCB to right lower extremity. Had not gone to follow up appts. She now has what appears to be dry gangrene to right toes with bone exposure. She has not been compliant with medications and states she had not taken eliquis in over a month. She states she has not been able to stand due to right leg weakness since her left AKA in May 2021. She also has a pressure wound to the lateral right lower leg. She takes her 's percocet for pain, which was helping some.       Procedure(s) (LRB):  AMPUTATION, BELOW KNEE (Right)      Hospital Course:   Patient admitted with osteomyelitis of RLE and gangrene, ID and podiatry consulted and patient was started on IV vancomycin and zosyn. Podiatry recommended general surgery consult for BKA, which was done. Patient continued to have leukocytosis so remains on IV antibiotics. PT/OT recommending rehab. ID re-consulted on 1/2 due to persistent leukocytosis and repeat blood cultures, CXR, and U/A ordered. Continuing IV zosyn because patient is allergic to ciprofloxacin - appreciate ID recs.De-escalated to PO augmentin on 1/3/22 and did well, meds adjusted to better  neuropathic pain control (increased lyrica). Wound care and surgery followed for wound care recommendations and patient was discharged to rehab in stable condition.        Goals of Care Treatment Preferences:  Code Status: Full Code      Consults:   Consults (From admission, onward)        Status Ordering Provider     Inpatient consult to Infectious Diseases  Once        Provider:  (Not yet assigned)    Completed RADHA GUTHRIE     Inpatient consult to Registered Dietitian/Nutritionist  Once        Provider:  (Not yet assigned)    Completed RADHA GUTHRIE     Inpatient consult to Psychiatry  Once        Provider:  Adalberto Gaston MD    Completed PHILIPPE PURCELL     Inpatient virtual consult to Hospital Medicine  Once        Provider:  (Not yet assigned)    Completed BARBARA SOMERS     Inpatient consult to Infectious Diseases  Once        Provider:  (Not yet assigned)    Completed PHILIPPE PURCELL     Inpatient consult to Podiatry  Once        Provider:  Humberto August DPM    Completed PHILIPPE PURCELL     Cardiology-[facility]  Once        Provider:  Saad Arenas MD    Completed PHILIPPE PURCELL          No new Assessment & Plan notes have been filed under this hospital service since the last note was generated.  Service: Hospital Medicine    Final Active Diagnoses:    Diagnosis Date Noted POA    PRINCIPAL PROBLEM:  S/P BKA (below knee amputation) unilateral, right [Z89.511] 12/30/2021 Not Applicable    Osteomyelitis of right foot [M86.9] 12/22/2021 Yes    Cellulitis [L03.90] 12/21/2021 Yes    Diabetic ulcer of right foot associated with type 2 diabetes mellitus [E11.621, L97.519] 12/21/2021 Yes    Stage 3 chronic kidney disease due to type 2 diabetes mellitus [E11.22, N18.30] 07/01/2021 Yes    Chronic anemia [D64.9] 03/23/2021 Yes    Critical lower limb ischemia [I70.229] 08/18/2020 Yes    Paroxysmal atrial fibrillation [I48.0] 01/28/2020 Yes     Chronic    Type 2  diabetes mellitus with hyperglycemia, with long-term current use of insulin [E11.65, Z79.4] 01/02/2020 Not Applicable    Chronic diastolic heart failure [I50.32] 12/08/2019 Yes    MDD (major depressive disorder), recurrent episode, moderate [F33.1] 05/07/2018 Yes     Chronic    Essential hypertension [I10] 05/05/2018 Yes     Chronic    Gangrene [I96] 07/06/2015 Yes      Problems Resolved During this Admission:    Diagnosis Date Noted Date Resolved POA    Hypokalemia [E87.6] 03/23/2021 01/14/2022 No       Discharged Condition: stable    Disposition: Rehab Facility    Follow Up:    Patient Instructions:   No discharge procedures on file.    Significant Diagnostic Studies: Microbiology:   Blood Culture   Lab Results   Component Value Date    LABBLOO No growth after 5 days. 01/19/2022    LABBLOO No growth after 5 days. 01/19/2022       Pending Diagnostic Studies:     None         Medications:  Reconciled Home Medications:      Medication List      START taking these medications    sertraline 50 MG tablet  Commonly known as: ZOLOFT  Take 1 tablet (50 mg total) by mouth once daily.        CHANGE how you take these medications    furosemide 40 MG tablet  Commonly known as: LASIX  Take 1 tablet (40 mg total) by mouth once daily.  What changed: when to take this     insulin detemir U-100 100 unit/mL (3 mL) Inpn pen  Commonly known as: Levemir FLEXTOUCH  Inject 8 Units into the skin once daily.  What changed: when to take this     mirtazapine 7.5 MG Tab  Commonly known as: REMERON  Take 2 tablets (15 mg total) by mouth nightly.  What changed:   · how much to take  · when to take this     pregabalin 50 MG capsule  Commonly known as: LYRICA  Take 3 capsules (150 mg total) by mouth 3 (three) times daily.  What changed: how much to take        CONTINUE taking these medications    acetaminophen 500 MG tablet  Commonly known as: TYLENOL  Take 2 tablets (1,000 mg total) by mouth every 8 (eight) hours as needed for Pain.      allopurinoL 100 MG tablet  Commonly known as: ZYLOPRIM  Take 1 tablet (100 mg total) by mouth once daily.     apixaban 5 mg Tab  Commonly known as: ELIQUIS  Take 1 tablet (5 mg total) by mouth 2 (two) times daily.     atorvastatin 80 MG tablet  Commonly known as: LIPITOR  Take 1 tablet (80 mg total) by mouth every evening.     clopidogreL 75 mg tablet  Commonly known as: PLAVIX  Take 1 tablet (75 mg total) by mouth once daily.     DEXCOM G6  Misc  Generic drug: blood-glucose meter,continuous  Use to check blood sugars 4 times/day     DEXCOM G6 SENSOR Radha  Generic drug: blood-glucose sensor  Apply one sensor to skin every 10 days     DEXCOM G6 TRANSMITTER Radha  Generic drug: blood-glucose transmitter  Replace transmitter every 3 months to use with dexcom sensor     insulin aspart U-100 100 unit/mL (3 mL) Inpn pen  Commonly known as: NovoLOG Flexpen U-100 Insulin  Inject 5 Units into the skin 3 (three) times daily with meals. If not eating, ok to hold     magnesium oxide 400 mg (241.3 mg magnesium) tablet  Commonly known as: MAG-OX  Take 1 tablet (400 mg total) by mouth 2 (two) times daily.     melatonin 3 mg tablet  Commonly known as: MELATIN  Take 2 tablets (6 mg total) by mouth nightly as needed for Insomnia.     sodium bicarbonate 650 MG tablet  Take 1 tablet (650 mg total) by mouth 2 (two) times daily.        STOP taking these medications    EScitalopram oxalate 10 MG tablet  Commonly known as: LEXAPRO            Indwelling Lines/Drains at time of discharge:   Lines/Drains/Airways     Drain            Female External Urinary Catheter 12/25/21 1300 32 days                Time spent on the discharge of patient: > 35 minutes         The attending portion of this evaluation, treatment, and documentation was performed per Ruben Flood MD via Telemedicine AudioVisual using the secure [employer] software platform with 2 way audio/video. The provider was located off-site and the patient is located in the  hospitals. The aforementioned video software was utilized to document the relevant history and physical exam    Ruben Flood MD  Department of [facility] Medicine  [facility]

## 2022-01-27 NOTE — CONSULTS
[facility]  Hospital Medicine  Telemedicine Consult Note    Thank you for your consult to [facility]. We have reviewed the patient chart. This patient does not meet criteria for [facility] service at this time due to Patient has a condition likely to benefit from in-depth physical exam, or palpation / auscultation, or serial abdominal exams, or patient is in afib with rvr and having acute blood loss anemia requiring a blood transfusion yesterday.  Needs further work up of blood loss and treatment of afib with rvr.  Please re-consult this evening once more medically stable.. Will hand back to In-house service..      Zackary Dye MD  Department of Hospital Medicine   [facility] Surg

## 2022-01-27 NOTE — PROGRESS NOTES
[facility]           Pharmacy        Current Drug Shortage     Due to national backorder and [facility] is critically low on inventory of Dextrose 50% (D50) Syringes and Vials, pharmacy has automatically switched from D50% to D10% IVPB at the equivalent dose until resolution of the shortage per P&T approved protocol. At this time, D10% bags are floor stock and can be pulled from this supply when needed.              Earnestine Olivares, PharmD  [phone]

## 2022-01-27 NOTE — PLAN OF CARE
Problem: Physical Therapy Goal  Goal: Physical Therapy Goal  Description: Goals to be met by: 22     Patient will increase functional independence with mobility by performin. Supine <> sit with Stand-by Assistance  2. Bed to chair transfer with Minimal Assistance using scoot transfer or slideboard/beasy board  3. Wheelchair propulsion x 50 feet with Stand-by Assistance using bilateral upper extremities MET 2022    Updated  3. Wheelchair propulsion x 150 feet with Stand-by Assistance using bilateral upper extremities    Outcome: Ongoing, Progressing   Assisted pt with holding RLE for wound care nurse to change dressing; pt transferred sup to sit EOB with modA, use of side rail and increased time to complete; R residual limb supported on pillow in chair; pt performed lean seated postural exercises, UE ex with OT and supine RLE AAROM ex- pt tearful and crying intermittently 2/2 pain though pt had been pre medicated; will cont with POC.

## 2022-01-27 NOTE — PROGRESS NOTES
01/27/2022 @ 2:00 PM- KEISHAW contacted pt via room phone to discuss discharge and additional social barriers to care. Pt did not identify any additional social barriers to care at this time. Per pt, pt is not in need of resources at this time. Pt working with therapy at this time. RSW will follow up with patient at a later time.      IP Liaison - Final Visit Note    Patient: Suyapa Connelly  MRN:  [id number]  Date of Service:  2/1/2022  Completed by:  JENNIFER Lazo    Reason for Visit   Patient presents with    IP Liaison Chart Review       Patient Summary     Discharge Date: 1/29/2022  Discharge telephone number/address: [phone] / [facility]  Follow up provider: Kaitlyn Rojas MD  Follow up appointments: 2/15/2022 @ 1:45pm  Home Health agency & telephone number: n/a  DME ordered &  name: n/a  Assigned OPCM RN/SW: n/a  Report sent to follow up team (PCP/OPCM) via in basket message: n/a  Community Resources arranged including agency name & contact info: [employer] summary of benefits      JENNIFER Lazo

## 2022-01-28 LAB
POCT GLUCOSE: 112 MG/DL (ref 70–110)
POCT GLUCOSE: 122 MG/DL (ref 70–110)
POCT GLUCOSE: 72 MG/DL (ref 70–110)
POCT GLUCOSE: 89 MG/DL (ref 70–110)

## 2022-01-28 PROCEDURE — 27000207 HC ISOLATION: Mod: HCNC

## 2022-01-28 PROCEDURE — 97530 THERAPEUTIC ACTIVITIES: CPT | Mod: HCNC

## 2022-01-28 PROCEDURE — 97110 THERAPEUTIC EXERCISES: CPT | Mod: HCNC

## 2022-01-28 PROCEDURE — 25000003 PHARM REV CODE 250: Mod: HCNC

## 2022-01-28 PROCEDURE — 11000001 HC ACUTE MED/SURG PRIVATE ROOM: Mod: HCNC

## 2022-01-28 PROCEDURE — 94761 N-INVAS EAR/PLS OXIMETRY MLT: CPT | Mod: HCNC

## 2022-01-28 PROCEDURE — 25000003 PHARM REV CODE 250: Mod: HCNC | Performed by: SURGERY

## 2022-01-28 PROCEDURE — 25000003 PHARM REV CODE 250: Mod: HCNC | Performed by: FAMILY MEDICINE

## 2022-01-28 PROCEDURE — 63600175 PHARM REV CODE 636 W HCPCS: Mod: HCNC | Performed by: FAMILY MEDICINE

## 2022-01-28 RX ADMIN — APIXABAN 5 MG: 5 TABLET, FILM COATED ORAL at 08:01

## 2022-01-28 RX ADMIN — PREGABALIN 150 MG: 75 CAPSULE ORAL at 08:01

## 2022-01-28 RX ADMIN — OXYCODONE AND ACETAMINOPHEN 1 TABLET: 10; 325 TABLET ORAL at 03:01

## 2022-01-28 RX ADMIN — FUROSEMIDE 40 MG: 40 TABLET ORAL at 05:01

## 2022-01-28 RX ADMIN — FUROSEMIDE 40 MG: 40 TABLET ORAL at 08:01

## 2022-01-28 RX ADMIN — SODIUM BICARBONATE 650 MG: 650 TABLET ORAL at 09:01

## 2022-01-28 RX ADMIN — PREGABALIN 150 MG: 75 CAPSULE ORAL at 09:01

## 2022-01-28 RX ADMIN — SODIUM BICARBONATE 650 MG: 650 TABLET ORAL at 08:01

## 2022-01-28 RX ADMIN — CLOPIDOGREL 75 MG: 75 TABLET, FILM COATED ORAL at 08:01

## 2022-01-28 RX ADMIN — INSULIN ASPART 5 UNITS: 100 INJECTION, SOLUTION INTRAVENOUS; SUBCUTANEOUS at 05:01

## 2022-01-28 RX ADMIN — SERTRALINE HYDROCHLORIDE 50 MG: 50 TABLET ORAL at 08:01

## 2022-01-28 RX ADMIN — ALLOPURINOL 100 MG: 100 TABLET ORAL at 08:01

## 2022-01-28 RX ADMIN — PREGABALIN 150 MG: 75 CAPSULE ORAL at 03:01

## 2022-01-28 RX ADMIN — MEROPENEM 1 G: 1 INJECTION, POWDER, FOR SOLUTION INTRAVENOUS at 09:01

## 2022-01-28 RX ADMIN — OXYCODONE AND ACETAMINOPHEN 1 TABLET: 10; 325 TABLET ORAL at 08:01

## 2022-01-28 RX ADMIN — MEROPENEM 1 G: 1 INJECTION, POWDER, FOR SOLUTION INTRAVENOUS at 08:01

## 2022-01-28 RX ADMIN — MIRTAZAPINE 15 MG: 7.5 TABLET ORAL at 09:01

## 2022-01-28 RX ADMIN — ATORVASTATIN CALCIUM 80 MG: 40 TABLET, FILM COATED ORAL at 09:01

## 2022-01-28 RX ADMIN — APIXABAN 5 MG: 5 TABLET, FILM COATED ORAL at 09:01

## 2022-01-28 RX ADMIN — HYDROMORPHONE HYDROCHLORIDE 0.5 MG: 1 INJECTION, SOLUTION INTRAMUSCULAR; INTRAVENOUS; SUBCUTANEOUS at 05:01

## 2022-01-28 NOTE — PLAN OF CARE
[facility]    FACILITY TRANSFER ORDERS      Patient Name: Suyapa Connelly  YOB: 1965    PCP: Magen Christensen MD   PCP Address: [address] [place] [address]  PCP Phone Number: [phone]  PCP Fax: [phone]    Encounter Date: 01/28/2022    Admit to: [facility]    Vital Signs:  Routine    Diagnoses:   Active Hospital Problems    Diagnosis  POA    *Infected wound [T14.8XXA, L08.9]  Yes    Decubitus ulcer of sacral region, unstageable [L89.150]  Yes    S/P BKA (below knee amputation) unilateral, right [Z89.511]  Not Applicable    Phantom limb syndrome with pain [G54.6]  Yes    Chronic anemia [D64.9]  Yes     Formatting of this note might be different from the original.  Last Assessment & Plan:   Formatting of this note might be different from the original.  Chronic disease - MCV & MCH normal; monitor - denies acute blood loss, no indication for transfusion at this time. Plan to transfuse if <7 with active bleeding      Type 2 diabetes mellitus with diabetic nephropathy, with long-term current use of insulin [E11.21, Z79.4]  Not Applicable    Paroxysmal atrial fibrillation [I48.0]  Yes     Chronic    Mixed hyperlipidemia [E78.2]  Yes     Chronic    MDD (major depressive disorder), recurrent episode, moderate [F33.1]  Yes     Chronic    Peripheral vascular disease [I73.9]  Yes      Resolved Hospital Problems   No resolved problems to display.       Allergies:  Review of patient's allergies indicates:   Allergen Reactions    Ciprofloxacin Itching    Contrast media      Kidney injury    Iodine      Kidney injury       Diet: cardiac diet and diabetic diet: 2000 calorie    Activities: Activity as tolerated    Nursing: per facility protocol     Labs: CBC, CMP, ESR and CRP weekly      CONSULTS:    Physical Therapy to evaluate and treat.  and Occupational Therapy to evaluate and treat.    RUE Midline access present     WOUND CARE ORDERS  Yes: Surgical Wound:   Location: Right BKA    Consult ET nurse        Apply the following to wound:   Other: apply santyl , xeroform , 4 x 4 , kerlix , and ace bandage daily (frequency)    Medications: Review discharge medications with patient and family and provide education.      Current Discharge Medication List      START taking these medications    Details   MEROPENEM 1 G/100 ML NS, READY TO MIX SYSTEM, Inject 100 mLs (1 g total) into the vein every 12 (twelve) hours. for 10 days  Qty: 2000 mL, Refills: 0 (End date 2/7/22)      oxyCODONE-acetaminophen (PERCOCET)  mg per tablet Take 1 tablet by mouth every 6 (six) hours as needed.  Qty: 20 tablet, Refills: 0    Comments: Quantity prescribed more than 7 day supply? Yes, quantity medically necessary         CONTINUE these medications which have CHANGED    Details   insulin detemir U-100 (LEVEMIR FLEXTOUCH) 100 unit/mL (3 mL) SubQ InPn pen Inject 12 Units into the skin every evening.  Qty: 20 mL, Refills: 0         CONTINUE these medications which have NOT CHANGED    Details   acetaminophen (TYLENOL) 500 MG tablet Take 2 tablets (1,000 mg total) by mouth every 8 (eight) hours as needed for Pain.  Refills: 0      allopurinoL (ZYLOPRIM) 100 MG tablet Take 1 tablet (100 mg total) by mouth once daily.  Qty: 30 tablet, Refills: 5      apixaban (ELIQUIS) 5 mg Tab Take 1 tablet (5 mg total) by mouth 2 (two) times daily.  Qty: 180 tablet, Refills: 3      atorvastatin (LIPITOR) 80 MG tablet Take 1 tablet (80 mg total) by mouth every evening.  Qty: 90 tablet, Refills: 3      blood-glucose meter,continuous (DEXCOM G6 ) Misc Use to check blood sugars 4 times/day  Qty: 1 each, Refills: 0    Associated Diagnoses: Type 2 diabetes mellitus with diabetic nephropathy, with long-term current use of insulin      blood-glucose sensor (DEXCOM G6 SENSOR) Radha Apply one sensor to skin every 10 days  Qty: 3 each, Refills: 11    Associated Diagnoses: Type 2 diabetes mellitus with diabetic  nephropathy, with long-term current use of insulin      blood-glucose transmitter (DEXCOM G6 TRANSMITTER) Radha Replace transmitter every 3 months to use with dexcom sensor  Qty: 1 each, Refills: 3    Associated Diagnoses: Type 2 diabetes mellitus with diabetic nephropathy, with long-term current use of insulin      clopidogreL (PLAVIX) 75 mg tablet Take 1 tablet (75 mg total) by mouth once daily.  Qty: 90 tablet, Refills: 3      furosemide (LASIX) 40 MG tablet Take 1 tablet (40 mg total) by mouth once daily.      HYDROcodone-acetaminophen (NORCO) 5-325 mg per tablet Take 1 tablet by mouth every 4 (four) hours as needed for Pain.  Qty: 12 tablet, Refills: 0    Comments: Quantity prescribed more than 7 day supply? No      insulin aspart U-100 (NOVOLOG FLEXPEN U-100 INSULIN) 100 unit/mL (3 mL) InPn pen Inject 5 Units into the skin 3 (three) times daily with meals. If not eating, ok to hold  Qty: 1 Box, Refills: 3    Associated Diagnoses: Uncontrolled type 2 diabetes mellitus with hyperglycemia; Type 2 diabetes mellitus with hyperglycemia, with long-term current use of insulin      magnesium oxide (MAG-OX) 400 mg (241.3 mg magnesium) tablet Take 1 tablet (400 mg total) by mouth 2 (two) times daily.  Refills: 0      melatonin (MELATIN) 3 mg tablet Take 2 tablets (6 mg total) by mouth nightly as needed for Insomnia.  Refills: 0      mirtazapine (REMERON) 7.5 MG Tab Take 2 tablets (15 mg total) by mouth nightly.  Qty: 60 tablet, Refills: 11      multivit/folic acid/vit K1 (ONE-A-DAY WOMEN'S 50 PLUS ORAL) Take 1 tablet by mouth Daily.      pregabalin (LYRICA) 50 MG capsule Take 3 capsules (150 mg total) by mouth 3 (three) times daily.  Qty: 90 capsule, Refills: 0    Associated Diagnoses: Type 2 diabetes mellitus with diabetic nephropathy, with long-term current use of insulin      sertraline (ZOLOFT) 50 MG tablet Take 1 tablet (50 mg total) by mouth once daily.  Qty: 30 tablet, Refills: 11      sodium bicarbonate 650 MG  tablet Take 1 tablet (650 mg total) by mouth 2 (two) times daily.  Qty: 60 tablet, Refills: 11         STOP taking these medications       amoxicillin-clavulanate 875-125mg (AUGMENTIN) 875-125 mg per tablet Comments:   Reason for Stopping:                  Immunizations Administered as of 1/28/2022     Name Date Dose VIS Date Route Exp Date    COVID-19, MRNA, LN-S, PF (Pfizer) (Purple Cap) 3/30/2021 11:24 AM 0.3 mL 12/12/2020 Intramuscular 7/31/2021    Site: Right deltoid     Given By: Margoth Alfredo     : [employer]     Lot: [id number]     COVID-19, MRNA, LN-S, PF (Pfizer) (Purple Cap) 3/9/2021  2:50 PM 0.3 mL 12/12/2020 Intramuscular 6/30/2021    Site: Right deltoid     Given By: Amira Berrios RN     : [employer]     Lot: [id number]           This patient has had both covid vaccinations    Some patients may experience side effects after vaccination.  These may include fever, headache, muscle or joint aches.  Most symptoms resolve with 24-48 hours and do not require urgent medical evaluation unless they persist for more than 72 hours or symptoms are concerning for an unrelated medical condition.          _________________________________  Tawnya Montes MD  01/28/2022

## 2022-01-28 NOTE — PLAN OF CARE
pt for d/c tomorrow - Sat 1/29 to [facility]  per Vicki     f/u apt made with Surgeon - 2 wks (no apts available in wound care until late Feb.)           01/28/22 1600   Post-Acute Status   Post-Acute Authorization Placement   Post-Acute Placement Status Pending medical clearance/testing   Hospital Resources/Appts/Education Provided Appointments scheduled by Navigator/Coordinator   Discharge Delays None known at this time   Discharge Plan   Discharge Plan A Rehab

## 2022-01-28 NOTE — PT/OT/SLP PROGRESS
Physical Therapy Treatment    Patient Name:  Suyapa Connelly   MRN:  [id number]    Recommendations:     Discharge Recommendations:  rehabilitation facility   Discharge Equipment Recommendations:  (TBD)   Barriers to discharge: None    Assessment:     Suyapa Connelly is a 56 y.o. female admitted with a medical diagnosis of Infected wound.  She presents with the following impairments/functional limitations:  weakness,impaired endurance,impaired self care skills,impaired functional mobilty,gait instability,impaired balance,decreased lower extremity function,decreased upper extremity function,impaired cognition,decreased coordination,pain,decreased ROM,impaired skin,orthopedic precautions Patient progressed to OOB transfer to w/c using beasy board/wooden slide board with maxA x 2; sat up ~1/5 hrs and propelled w/c~2 x 80ft with SBA and VCs for technique; pt with RLE pain/weakness; some confusion and drowsiness from pain meds; cont with POC.     Rehab Prognosis: fair to good; patient would benefit from acute skilled PT services to address these deficits and reach maximum level of function.    Recent Surgery: Procedure(s) (LRB):  REVISION-STUMP (Right) 3 Days Post-Op    Plan:     During this hospitalization, patient to be seen 6 x/week to address the identified rehab impairments via therapeutic activities,therapeutic exercises,neuromuscular re-education,wheelchair management/training and progress toward the following goals:    · Plan of Care Expires:  02/25/22    Subjective     Pain/Comfort:  · Pain Rating 1:  (not rated, pt grimacing and gently rubbing/stroking residual limb; crying intermittently in pain)  · Location - Side 1: Right  · Location - Orientation 1: lower  · Location 1: leg (residual limb with phantom pain)  · Pain Addressed 1: Pre-medicate for activity,Reposition,Distraction  · Pain Rating Post-Intervention 1:  (did not rate; pain intermittent)      Objective:     Communicated with nurse prior to session.   Patient found HOB elevated with bed alarm,PICC line upon PT entry to room.     General Precautions: Standard, fall,contact,diabetic   Orthopedic Precautions: ((R) BKA, (L) AKA (old))   Braces: N/A  Respiratory Status: Room air     Functional Mobility:  · Bed Mobility:     · Rolling Left:  minimum assistance and with side rail and for RLE assist, increased time/effort  · Scooting: minimum assistance  · Supine to Sit: minimum assistance and with side rail and RLE assist; CGA at trunk, increased time and effort; limited by RLE pain  · Sit to Supine: minimum assistance and for RLE assist  · Transfers:     · Bed to Chair: use of beasy board to w/c, wooden slide board w/c to bed (2/2 going from low to higher surface) with maxA of 2 persons with slide board  · Wheelchair Propulsion: Pt propelled self in w/c ~80ft x 2 with SBA, VCs to avoid objects on L side, make wider turns on approaching doorway or around corners; 2 rest breaks required; VCs to use outer rims on wheels to propel w/c; 2 brief rests taken     AM-PAC 6 CLICK MOBILITY  Turning over in bed (including adjusting bedclothes, sheets and blankets)?: 3  Sitting down on and standing up from a chair with arms (e.g., wheelchair, bedside commode, etc.): 1  Moving from lying on back to sitting on the side of the bed?: 2  Moving to and from a bed to a chair (including a wheelchair)?: 2  Need to walk in hospital room?: 1  Climbing 3-5 steps with a railing?: 1  Basic Mobility Total Score: 10       Therapeutic Activities and Exercises:   Patient continues to be motivated to participate though intermittently crying 2/2 pain; pt performed bed mobility to EOB as above with increased time/effort; pt performed TKEs with assist 3 x 10 reps with tapping facilitation/eccentric holds- notable atrophy/, reverse sit ups 2 x 10 with assist and VCs for head lift and trunk ext; bed to w/c maxA x 2person assist using beasy board and drawsheet and 3 scoots; maxA x 2 to reposition hips to  midline in w/c to adjust pillow cushion under buttocks- pt performed push up using BUEs to unweight buttocks to clear off chair; pt sat up to eat lunch; returned to find pt wheeled to pollock with OT; pt wheeled back to room as described above; returned to bed with maxA x2 using wooden slide board, sit to sup with Sid for RLE with slow movement with increased time 2/2 pain; had some confusion and drowsiness throughout 2/2 pain meds.    Patient left left sidelying with all lines intact, call button in reach and bed alarm on..    GOALS:   Multidisciplinary Problems     Physical Therapy Goals        Problem: Physical Therapy Goal    Goal Priority Disciplines Outcome Goal Variances Interventions   Physical Therapy Goal     PT, PT/OT Ongoing, Progressing     Description: Goals to be met by: 22     Patient will increase functional independence with mobility by performin. Supine <> sit with Stand-by Assistance  2. Bed to chair transfer with Minimal Assistance using scoot transfer or slideboard/beasy board  3. Wheelchair propulsion x 50 feet with Stand-by Assistance using bilateral upper extremities MET 2022    Updated  3. Wheelchair propulsion x 150 feet with Stand-by Assistance using bilateral upper extremities                     Time Tracking:     PT Received On: 22  PT Start Time: 1327 (1218)     PT Stop Time: 1419 (1300)  PT Total Time (min): 52 min + 42= 94 with OT    Billable Minutes: Therapeutic Activity 25 and Therapeutic Exercise 22       PT/PTA: PT     PTA Visit Number: 0     2022

## 2022-01-28 NOTE — PROGRESS NOTES
"Surgery follow up  BP (!) 132/58 (BP Location: Left arm, Patient Position: Lying)   Pulse 98   Temp 98.6 °F (37 °C) (Oral)   Resp 18   Ht 5' 5" (1.651 m)   Wt 70.5 kg (155 lb 6.8 oz)   LMP 09/30/2015 (Exact Date)   SpO2 100%   BMI 25.86 kg/m²   I/O last 3 completed shifts:  In: 447.5 [I.V.:20.8; Blood:326.7; IV Piggyback:100]  Out: 1 [Stool:1]  No intake/output data recorded.  Recent Results (from the past 336 hour(s))   CBC Auto Differential    Collection Time: 01/27/22  2:57 PM   Result Value Ref Range    WBC 10.77 3.90 - 12.70 K/uL    Hemoglobin 7.6 (L) 12.0 - 16.0 g/dL    Hematocrit 24.0 (L) 37.0 - 48.5 %    Platelets 346 150 - 450 K/uL   CBC Auto Differential    Collection Time: 01/26/22  7:32 AM   Result Value Ref Range    WBC 9.17 3.90 - 12.70 K/uL    Hemoglobin 6.2 (L) 12.0 - 16.0 g/dL    Hematocrit 20.9 (L) 37.0 - 48.5 %    Platelets 339 150 - 450 K/uL   CBC Auto Differential    Collection Time: 01/22/22  3:35 AM   Result Value Ref Range    WBC 7.40 3.90 - 12.70 K/uL    Hemoglobin 8.1 (L) 12.0 - 16.0 g/dL    Hematocrit 27.1 (L) 37.0 - 48.5 %    Platelets 268 150 - 450 K/uL   wound better , continue present treatment.    "

## 2022-01-28 NOTE — PLAN OF CARE
per Vicki with [facility] - pt for d/c to [facility] tomorrow, Sat 1/29 -     pt choice form signed   [place] called and spoke with pt's  Randell [phone] - he was informed that pt is due for transfer in am.      f/u apt made with Surgeon (no apts available at [facility] until late Feb).       Sat [place] - please arrange transport - MD to place orders   - per Vicki - [place] is working        Future Appointments   Date Time Provider Department Center   2/15/2022  1:45 PM Kaitlyn Rojas MD [facility]   2/24/2022 10:00 AM Kaitlyn Rojas MD [facility] WOUND [facility]   4/4/2022  2:30 PM Fawad Jameson MD [facility]        01/28/22 1636   Post-Acute Status   Post-Acute Authorization Placement   Post-Acute Placement Status Pending medical clearance/testing   Discharge Plan   Discharge Plan A Rehab

## 2022-01-28 NOTE — PT/OT/SLP PROGRESS
Occupational Therapy   Treatment    Name: Suyapa Connelly  MRN: [id number]  Admitting Diagnosis:  Infected wound  4 Days Post-Op    Recommendations:     Discharge Recommendations: rehabilitation facility  Discharge Equipment Recommendations:  other (see comments) (TBD)  Barriers to discharge:  None    Assessment:     Suyapa Connelly is a 56 y.o. female with a medical diagnosis of Infected wound.  She presents with performance deficits affecting function are weakness,impaired functional mobilty,impaired endurance,gait instability,impaired self care skills,impaired balance,decreased upper extremity function,decreased lower extremity function,pain,impaired skin,edema,impaired cardiopulmonary response to activity.     Rehab Prognosis:  Good; patient would benefit from acute skilled OT services to address these deficits and reach maximum level of function.       Plan:     Patient to be seen 5 x/week to address the above listed problems via self-care/home management,therapeutic exercises,therapeutic activities  · Plan of Care Expires: 02/28/22  · Plan of Care Reviewed with: patient    Subjective     Pain/Comfort:  · Pain Rating 1: other (see comments) (did not rate, however crying and yelling out)  · Location - Side 1: Right  · Location - Orientation 1: lower  · Location 1: leg  · Pain Addressed 1: Pre-medicate for activity,Nurse notified,Reposition,Distraction,Cessation of Activity  · Pain Rating Post-Intervention 1: other (see comments) (didnot rate)    Objective:     Communicated with: nurse prior to session.  Patient found HOB elevated with bed alarm,PICC line upon OT entry to room.    General Precautions: Standard, fall,contact,diabetic   Orthopedic Precautions: ((R) BKA, L (AKA) old)   Braces:    Respiratory Status: Room air     Occupational Performance:     Bed Mobility:    · Patient completed Rolling/Turning to Left with  maximal assistance, total assistance and 2 persons  · Patient completed Scooting/Bridging  with maximal assistance, total assistance and 2 persons  · Patient completed Supine to Sit with maximal assistance, total assistance and 2 persons  · Patient completed Sit to Supine with maximal assistance, total assistance and 2 persons     AMPAC 6 Click ADL: 12    Treatment & Education:  Pt drowsy, difficult to arouse; nurse presented to room and gave pt juice. Pt with positive response however she continued with drowsiness and decreased alertness and command following. Pt performed reaching L and R with focus on forward wt shift and midline sitting as pt had posterior lean in sitting and difficulty maintaining alertness.  REturned to supine and up to HOB for lunch    Patient left HOB elevated with all lines intact, call button in reach, bed alarm on and nurse notifiedEducation:      GOALS:   Multidisciplinary Problems     Occupational Therapy Goals        Problem: Occupational Therapy Goal    Goal Priority Disciplines Outcome Interventions   Occupational Therapy Goal     OT, PT/OT Ongoing, Progressing    Description: Goals to be met by: 2/18     Patient will increase functional independence with ADLs by performing:    UE Dressing with Stand-by Assistance.  LE Dressing with Minimal Assistance.  Grooming while seated with Modified [place].  Toilet transfer to bedside commode with Moderate Assistance.  Upper extremity exercise program x10 reps per handout, with independence.                     Time Tracking:     OT Date of Treatment: 01/28/22  OT Start Time: 1145  OT Stop Time: 1223  OT Total Time (min): 38 min    Billable Minutes:Therapeutic Activity 15               1/28/2022

## 2022-01-28 NOTE — PROGRESS NOTES
[facility] Medicine  Progress Note    Patient Name: Suyapa Connelly  MRN: [id number]  Patient Class: IP- Inpatient   Admission Date: 1/17/2022  Length of Stay: 8 days  Attending Physician: Tawnya Montes*  Primary Care Provider: Magen Christensen MD        Subjective:     Principal Problem:Infected wound        HPI:  Suyapa Connelly is a 57 y/o female with PMHx CKD3, DMII, GERD, HLD, HTN, osteomyelitis, PVD, depression, and anxiety presents to ED with  with continue concern for wound infection.  Patient underwent right BKA by Dr. Rojas on 12/28 then discharged to rehab facility on 1/10.  Rehab facility staff became concerned for possible wound infection and patient was sent back to ED on 1/11.  Lab work was obtained, and images of wound were reviewed with Dr. Rojas, who recommended continued outpatient management.  At discharge patient was prescribed Augmentin, which she has continued taking as instructed.   at bedside is concern for progressively worsening appearance of wound, along with continued drainage.  Patient reporting worsened pain today described as sharp, worse with touch, radiating towards right knee, severity 8/10.  No fevers, chills, nausea, vomiting, chest pain, cough, shortness of breath, abdominal pain, urinary or bowel complications.  She does admit to not taking her BP medication today.  No other acute complaints at this time. Will admit to hospital medicine for observation services. General surgery consulted.       Overview/Hospital Course:  Pt placed in observation for evaluation of wound infection.  Pt s/p R BKA with Dr. Rojas on 12/28/21.  Vanc and cefazolin initiated.  Wound culture presumptive pseudomonas species cefazolin discontinued and changed to cefepime.  UC candida and BC 1 of 2 bottles coag-neg staph suspected contaminant.  Gen Surg consulted and recommended santyl, xeroform, 4 x 4, kerlix and ace bandage daily.  Wound culture pseudomonas  sensitive to cefepime, GNR with I&S pending.    PT/OT recommended rehab facility.  WC consulted and unstageable sacral decub.  Recommended waffle overlay and triad ointment to L buttocks BID and prn incontinent episode.      Interval History:  Awake and alert, no acute events overnight, s/p debridement right BKA 1/24  Appreciate ID recs continue  IV antibiotics x 2 weeks of past the R BKA stump revision surgery on 1/24/22. (stop date  February 7, 2022.   S/p 1U prbc -H/H stable  Medically ready for discharge  Awaiting placement    Review of Systems   Constitutional: Negative for chills, diaphoresis, fatigue and fever.   HENT: Negative for hearing loss and sore throat.    Respiratory: Negative for cough and shortness of breath.    Cardiovascular: Negative for chest pain and leg swelling.   Gastrointestinal: Negative for abdominal pain, diarrhea, nausea and vomiting.   Genitourinary: Negative for difficulty urinating and flank pain.   Musculoskeletal: Negative for back pain.   Skin: Positive for wound. Negative for rash.   Neurological: Negative for dizziness, weakness and headaches.   Psychiatric/Behavioral: Negative for agitation and confusion.     Objective:     Vital Signs (Most Recent):  Temp: 98.2 °F (36.8 °C) (01/28/22 1112)  Pulse: 91 (01/28/22 1112)  Resp: 18 (01/28/22 1112)  BP: (!) 113/55 (01/28/22 1112)  SpO2: 100 % (01/28/22 0844) Vital Signs (24h Range):  Temp:  [97 °F (36.1 °C)-98.6 °F (37 °C)] 98.2 °F (36.8 °C)  Pulse:  [] 91  Resp:  [18-20] 18  SpO2:  [100 %] 100 %  BP: (113-133)/(55-79) 113/55     Weight: 73.4 kg (161 lb 13.1 oz)  Body mass index is 26.93 kg/m².    Intake/Output Summary (Last 24 hours) at 1/28/2022 1319  Last data filed at 1/27/2022 2014  Gross per 24 hour   Intake 100 ml   Output 1 ml   Net 99 ml      Physical Exam  Vitals and nursing note reviewed.   Constitutional:       General: She is not in acute distress.     Appearance: She is obese. She is not ill-appearing.   HENT:       Head: Normocephalic and atraumatic.   Cardiovascular:      Rate and Rhythm: Normal rate and regular rhythm.      Pulses: Normal pulses.      Heart sounds: Normal heart sounds.   Pulmonary:      Effort: Pulmonary effort is normal.   Abdominal:      General: Bowel sounds are normal.      Palpations: Abdomen is soft.   Musculoskeletal:         General: No swelling.      Cervical back: Normal range of motion and neck supple.      Right Lower Extremity: Right leg is amputated below knee.      Left Lower Extremity: Left leg is amputated below knee.   Feet:      Right foot:      Skin integrity: No erythema or warmth.      Comments: S/p right BKA 12/28.   Wound dressing intact  Skin:     General: Skin is warm and dry.      Capillary Refill: Capillary refill takes less than 2 seconds.   Neurological:      General: No focal deficit present.      Mental Status: She is alert and oriented to person, place, and time.   Psychiatric:         Mood and Affect: Mood normal.         Behavior: Behavior normal. Behavior is cooperative.         Significant Labs: All pertinent labs within the past 24 hours have been reviewed.    Significant Imaging: I have reviewed all pertinent imaging results/findings within the past 24 hours.      Assessment/Plan:      * Infected wound  S/p right bka with Dr. Rojas on 12/28/21  Presents to ED with concerns for wound infection  BKA site with dehisced wound  Received zosyn x1  Vancomycin and cefazolin initiated, cultures pseudomonas.  Cefazolin changed to cefepime and Vanc.  General surgery consulted for possible debridement: check cultures , apply santyl , xeroform , 4 x 4 , kerlix , and ace bandage daily, iv antibiotics  ID consulted for recommendations on antibiotic therapy: vanc, cefepime.  Await final cultures. DC vancomycin on 1/24 and continue meropenem 1/24 pending final culture  plan 2 weeks of IV antibiotics past the R BKA stump revision surgery on 1/24/22. So stop date February 7, 2022.    Case Management trying to place patient   Need to get cbc and cmp at least weekly while on antibiotics  If needed can fax labs to Dr. Espino at [phone]   PT/OT: rehab      Decubitus ulcer of sacral region, unstageable  Wound Care assistance appreciated  Recommended waffle overlay and triad ointment to L buttocks BID and prn incontinent episode.    S/P BKA (below knee amputation) unilateral, right  See above      Phantom limb syndrome with pain  Resume home lyrica  Norco q4hr prn     Chronic anemia  Stable and at baseline      Type 2 diabetes mellitus with diabetic nephropathy, with long-term current use of insulin  Hemoglobin A1C   Date Value Ref Range Status   12/03/2021 9.6 (H) 4.0 - 5.6 % Final     Comment:     ADA Screening Guidelines:  5.7-6.4%  Consistent with prediabetes  >or=6.5%  Consistent with diabetes    High levels of fetal hemoglobin interfere with the HbA1C  assay. Heterozygous hemoglobin variants (HbS, HgC, etc)do  not significantly interfere with this assay.   However, presence of multiple variants may affect accuracy.                                       Patient's FSGs are uncontrolled due to hyperglycemia on current medication regimen.  - home regimen includes detemir, aspart  - hold home oral medications  - increased scheduled doses: detemir 10U qhs, aspart 2U tid plus scale will increase to 5U tid. Controlled.  - LDSSI with ACCUcheck ACHS  - Diabetic diet  -Hypoglycemia protocol PRN        Paroxysmal atrial fibrillation  Patient with paroxymal afib  Resumed home OAC, rate controlled  Monitor on telemetry          Mixed hyperlipidemia  Last LDL was   Lab Results   Component Value Date    LDLCALC 177.6 (H) 12/03/2021      Patient is chronically on statin.will continue for now.             MDD (major depressive disorder), recurrent episode, moderate  Resumed zoloft, stable      Peripheral vascular disease  Resume statin         VTE Risk Mitigation (From admission, onward)         Ordered      apixaban tablet 5 mg  2 times daily         01/17/22 1920     IP VTE HIGH RISK PATIENT  Once         01/17/22 1658     Place sequential compression device  Until discontinued         01/17/22 1658                Discharge Planning   DEVAN:      Code Status: Full Code   Is the patient medically ready for discharge?:     Reason for patient still in hospital (select all that apply): Pending disposition  Discharge Plan A: Rehab   Discharge Delays: (!) Post-Acute Set-up              Tawnya Montes MD  Department of Hospital Medicine   [facility]

## 2022-01-28 NOTE — PLAN OF CARE
Pt very drowsy-limiting her ability to participate in therapy today  Continue POC      Problem: Occupational Therapy Goal  Goal: Occupational Therapy Goal  Description: Goals to be met by: 2/18     Patient will increase functional independence with ADLs by performing:    UE Dressing with Stand-by Assistance.  LE Dressing with Minimal Assistance.  Grooming while seated with Modified [place].  Toilet transfer to bedside commode with Moderate Assistance.  Upper extremity exercise program x10 reps per handout, with independence.    Outcome: Ongoing, Progressing

## 2022-01-28 NOTE — PLAN OF CARE
Problem: Physical Therapy Goal  Goal: Physical Therapy Goal  Description: Goals to be met by: 22     Patient will increase functional independence with mobility by performin. Supine <> sit with Stand-by Assistance  2. Bed to chair transfer with Minimal Assistance using scoot transfer or slideboard/beasy board  3. Wheelchair propulsion x 50 feet with Stand-by Assistance using bilateral upper extremities MET 2022    Updated  3. Wheelchair propulsion x 150 feet with Stand-by Assistance using bilateral upper extremities    Outcome: Ongoing, Progressing   Patient progressed to OOB transfer to w/c using beasy board/wooden slide board with maxA x 2; sat up ~1/5 hrs and propelled w/c~2 x 80ft with SBA and VCs for technique; pt with RLE pain/weakness; some confusion and drowsiness from pain meds; cont with POC.

## 2022-01-28 NOTE — PROGRESS NOTES
"per Secure Chat note by Surgeon Dr. RAUL Rojas re:  wound care at [facility]:       "continue same wound care as being provided inpatient here, cleanse with wound cleanser and redress with xeroform , 4 x 4 , Kerlix and ace bandage, would like to see her in wound care in 2 weeks."      "

## 2022-01-28 NOTE — SUBJECTIVE & OBJECTIVE
Interval History:  Awake and alert, no acute events overnight, s/p debridement right BKA 1/24  Appreciate ID recs continue  IV antibiotics x 2 weeks of past the R BKA stump revision surgery on 1/24/22. (stop date  February 7, 2022.   S/p 1U prbc -H/H stable  Medically ready for discharge  Awaiting placement    Review of Systems   Constitutional: Negative for chills, diaphoresis, fatigue and fever.   HENT: Negative for hearing loss and sore throat.    Respiratory: Negative for cough and shortness of breath.    Cardiovascular: Negative for chest pain and leg swelling.   Gastrointestinal: Negative for abdominal pain, diarrhea, nausea and vomiting.   Genitourinary: Negative for difficulty urinating and flank pain.   Musculoskeletal: Negative for back pain.   Skin: Positive for wound. Negative for rash.   Neurological: Negative for dizziness, weakness and headaches.   Psychiatric/Behavioral: Negative for agitation and confusion.     Objective:     Vital Signs (Most Recent):  Temp: 98.2 °F (36.8 °C) (01/28/22 1112)  Pulse: 91 (01/28/22 1112)  Resp: 18 (01/28/22 1112)  BP: (!) 113/55 (01/28/22 1112)  SpO2: 100 % (01/28/22 0844) Vital Signs (24h Range):  Temp:  [97 °F (36.1 °C)-98.6 °F (37 °C)] 98.2 °F (36.8 °C)  Pulse:  [] 91  Resp:  [18-20] 18  SpO2:  [100 %] 100 %  BP: (113-133)/(55-79) 113/55     Weight: 73.4 kg (161 lb 13.1 oz)  Body mass index is 26.93 kg/m².    Intake/Output Summary (Last 24 hours) at 1/28/2022 1319  Last data filed at 1/27/2022 2014  Gross per 24 hour   Intake 100 ml   Output 1 ml   Net 99 ml      Physical Exam  Vitals and nursing note reviewed.   Constitutional:       General: She is not in acute distress.     Appearance: She is obese. She is not ill-appearing.   HENT:      Head: Normocephalic and atraumatic.   Cardiovascular:      Rate and Rhythm: Normal rate and regular rhythm.      Pulses: Normal pulses.      Heart sounds: Normal heart sounds.   Pulmonary:      Effort: Pulmonary effort is  normal.   Abdominal:      General: Bowel sounds are normal.      Palpations: Abdomen is soft.   Musculoskeletal:         General: No swelling.      Cervical back: Normal range of motion and neck supple.      Right Lower Extremity: Right leg is amputated below knee.      Left Lower Extremity: Left leg is amputated below knee.   Feet:      Right foot:      Skin integrity: No erythema or warmth.      Comments: S/p right BKA 12/28.   Wound dressing intact  Skin:     General: Skin is warm and dry.      Capillary Refill: Capillary refill takes less than 2 seconds.   Neurological:      General: No focal deficit present.      Mental Status: She is alert and oriented to person, place, and time.   Psychiatric:         Mood and Affect: Mood normal.         Behavior: Behavior normal. Behavior is cooperative.         Significant Labs: All pertinent labs within the past 24 hours have been reviewed.    Significant Imaging: I have reviewed all pertinent imaging results/findings within the past 24 hours.

## 2022-01-29 VITALS
HEART RATE: 91 BPM | WEIGHT: 170.88 LBS | RESPIRATION RATE: 18 BRPM | OXYGEN SATURATION: 100 % | BODY MASS INDEX: 28.47 KG/M2 | DIASTOLIC BLOOD PRESSURE: 68 MMHG | SYSTOLIC BLOOD PRESSURE: 144 MMHG | HEIGHT: 65 IN | TEMPERATURE: 99 F

## 2022-01-29 LAB
POCT GLUCOSE: 104 MG/DL (ref 70–110)
POCT GLUCOSE: 113 MG/DL (ref 70–110)
POCT GLUCOSE: 121 MG/DL (ref 70–110)
POCT GLUCOSE: 133 MG/DL (ref 70–110)
POCT GLUCOSE: 55 MG/DL (ref 70–110)

## 2022-01-29 PROCEDURE — 25000003 PHARM REV CODE 250: Mod: HCNC | Performed by: SURGERY

## 2022-01-29 PROCEDURE — 25000003 PHARM REV CODE 250: Mod: HCNC

## 2022-01-29 PROCEDURE — 63600175 PHARM REV CODE 636 W HCPCS: Mod: HCNC | Performed by: FAMILY MEDICINE

## 2022-01-29 PROCEDURE — 94761 N-INVAS EAR/PLS OXIMETRY MLT: CPT | Mod: HCNC

## 2022-01-29 PROCEDURE — 25000003 PHARM REV CODE 250: Mod: HCNC | Performed by: FAMILY MEDICINE

## 2022-01-29 RX ORDER — OXYCODONE AND ACETAMINOPHEN 10; 325 MG/1; MG/1
1 TABLET ORAL EVERY 6 HOURS PRN
Qty: 20 TABLET | Refills: 0 | Status: ON HOLD
Start: 2022-01-29 | End: 2022-03-16 | Stop reason: HOSPADM

## 2022-01-29 RX ADMIN — APIXABAN 5 MG: 5 TABLET, FILM COATED ORAL at 08:01

## 2022-01-29 RX ADMIN — FUROSEMIDE 40 MG: 40 TABLET ORAL at 10:01

## 2022-01-29 RX ADMIN — OXYCODONE AND ACETAMINOPHEN 1 TABLET: 10; 325 TABLET ORAL at 12:01

## 2022-01-29 RX ADMIN — PREGABALIN 150 MG: 75 CAPSULE ORAL at 08:01

## 2022-01-29 RX ADMIN — SODIUM BICARBONATE 650 MG: 650 TABLET ORAL at 08:01

## 2022-01-29 RX ADMIN — FUROSEMIDE 40 MG: 40 TABLET ORAL at 04:01

## 2022-01-29 RX ADMIN — PREGABALIN 150 MG: 75 CAPSULE ORAL at 04:01

## 2022-01-29 RX ADMIN — PREGABALIN 150 MG: 75 CAPSULE ORAL at 10:01

## 2022-01-29 RX ADMIN — SERTRALINE HYDROCHLORIDE 50 MG: 50 TABLET ORAL at 10:01

## 2022-01-29 RX ADMIN — CLOPIDOGREL 75 MG: 75 TABLET, FILM COATED ORAL at 10:01

## 2022-01-29 RX ADMIN — ATORVASTATIN CALCIUM 80 MG: 40 TABLET, FILM COATED ORAL at 08:01

## 2022-01-29 RX ADMIN — MEROPENEM 1 G: 1 INJECTION, POWDER, FOR SOLUTION INTRAVENOUS at 08:01

## 2022-01-29 RX ADMIN — APIXABAN 5 MG: 5 TABLET, FILM COATED ORAL at 10:01

## 2022-01-29 RX ADMIN — INSULIN ASPART 5 UNITS: 100 INJECTION, SOLUTION INTRAVENOUS; SUBCUTANEOUS at 01:01

## 2022-01-29 RX ADMIN — Medication 16 G: at 04:01

## 2022-01-29 RX ADMIN — MIRTAZAPINE 15 MG: 7.5 TABLET ORAL at 08:01

## 2022-01-29 RX ADMIN — OXYCODONE AND ACETAMINOPHEN 1 TABLET: 10; 325 TABLET ORAL at 04:01

## 2022-01-29 RX ADMIN — ALLOPURINOL 100 MG: 100 TABLET ORAL at 10:01

## 2022-01-29 RX ADMIN — INSULIN ASPART 5 UNITS: 100 INJECTION, SOLUTION INTRAVENOUS; SUBCUTANEOUS at 04:01

## 2022-01-29 RX ADMIN — MEROPENEM 1 G: 1 INJECTION, POWDER, FOR SOLUTION INTRAVENOUS at 10:01

## 2022-01-29 RX ADMIN — SODIUM BICARBONATE 650 MG: 650 TABLET ORAL at 10:01

## 2022-01-29 NOTE — DISCHARGE SUMMARY
[facility] Medicine  Discharge Summary      Patient Name: Suyapa Connelly  MRN: [id number]  Patient Class: IP- Inpatient  Admission Date: 1/17/2022  Hospital Length of Stay: 9 days  Discharge Date and Time: 1/29/2022  9:40 PM  Attending Physician: Tawnya Montes*   Discharging Provider: Tawnya Montes MD  Primary Care Provider: Magen Christensen MD      HPI:   Suyapa Connelly is a 57 y/o female with PMHx CKD3, DMII, GERD, HLD, HTN, osteomyelitis, PVD, depression, and anxiety presents to ED with  with continue concern for wound infection.  Patient underwent right BKA by Dr. Roajs on 12/28 then discharged to rehab facility on 1/10.  Rehab facility staff became concerned for possible wound infection and patient was sent back to ED on 1/11.  Lab work was obtained, and images of wound were reviewed with Dr. Rojas, who recommended continued outpatient management.  At discharge patient was prescribed Augmentin, which she has continued taking as instructed.   at bedside is concern for progressively worsening appearance of wound, along with continued drainage.  Patient reporting worsened pain today described as sharp, worse with touch, radiating towards right knee, severity 8/10.  No fevers, chills, nausea, vomiting, chest pain, cough, shortness of breath, abdominal pain, urinary or bowel complications.  She does admit to not taking her BP medication today.  No other acute complaints at this time. Will admit to hospital medicine for observation services. General surgery consulted.       Procedure(s) (LRB):  REVISION-STUMP (Right)      Hospital Course:   Pt placed in observation for evaluation of wound infection.  Pt s/p R BKA with Dr. Rojas on 12/28/21.  Vanc and cefazolin initiated.  Wound culture presumptive pseudomonas species cefazolin discontinued and changed to cefepime.  UC candida and BC 1 of 2 bottles coag-neg staph suspected contaminant.  Gen Surg consulted and  recommended santyl, xeroform, 4 x 4, kerlix and ace bandage daily.  Wound culture pseudomonas sensitive to cefepime, GNR with I&S pending.    PT/OT recommended rehab facility.  WC consulted and unstageable sacral decub.  Recommended waffle overlay and triad ointment to L buttocks BID and prn incontinent episode.       Goals of Care Treatment Preferences:  Code Status: Full Code      Consults:   Consults (From admission, onward)          Status Ordering Provider     Inpatient consult to Registered Dietitian/Nutritionist  Once        Provider:  (Not yet assigned)    Completed INNOCENT-ITUAH CELIA N.     Inpatient virtual consult to Hospital Medicine  Once        Provider:  (Not yet assigned)    Completed INNOCENT-ITUAH CELIA N.     Inpatient consult to PICC team ([facility])  Once        Provider:  (Not yet assigned)    Completed INNOCENT-ITUAH CELIA N.     Inpatient consult to Social Work  Once        Provider:  (Not yet assigned)    Acknowledged INNGHANSHYAM SCHUMACHEREKIMBERLY MARTIN     Inpatient consult to Infectious Diseases  Once        Provider:  (Not yet assigned)    Completed DAVID GAO     Inpatient consult to General surgery  Once        Provider:  Kaitlyn Sierra MD    Acknowledged KAITLYN SIERRA            * Infected wound  S/p right bka with Dr. Sierra on 12/28/21  Presents to ED with concerns for wound infection  BKA site with dehisced wound  Received zosyn x1  Vancomycin and cefazolin initiated, cultures pseudomonas.  Cefazolin changed to cefepime and Vanc.  General surgery consulted for possible debridement: check cultures , apply santyl , xeroform , 4 x 4 , kerlix , and ace bandage daily, iv antibiotics  ID consulted for recommendations on antibiotic therapy: vanc, cefepime.  Await final cultures. DC vancomycin on 1/24 and continue meropenem 1/24 pending final culture  plan 2 weeks of IV antibiotics past the R BKA stump revision surgery on 1/24/22. So stop date February 7, 2022.   Case Management  trying to place patient   Need to get cbc and cmp at least weekly while on antibiotics  If needed can fax labs to Dr. Espino at [phone]   PT/OT: rehab      Decubitus ulcer of sacral region, unstageable  Wound Care assistance appreciated  Recommended waffle overlay and triad ointment to L buttocks BID and prn incontinent episode.    Phantom limb syndrome with pain  Resume home lyrica  Norco q4hr prn     Chronic anemia  Stable and at baseline      Type 2 diabetes mellitus with diabetic nephropathy, with long-term current use of insulin  Hemoglobin A1C   Date Value Ref Range Status   12/03/2021 9.6 (H) 4.0 - 5.6 % Final     Comment:     ADA Screening Guidelines:  5.7-6.4%  Consistent with prediabetes  >or=6.5%  Consistent with diabetes    High levels of fetal hemoglobin interfere with the HbA1C  assay. Heterozygous hemoglobin variants (HbS, HgC, etc)do  not significantly interfere with this assay.   However, presence of multiple variants may affect accuracy.                                       Patient's FSGs are uncontrolled due to hyperglycemia on current medication regimen.  - home regimen includes detemir, aspart  - hold home oral medications  - increased scheduled doses: detemir 10U qhs, aspart 2U tid plus scale will increase to 5U tid. Controlled.  - LDSSI with ACCUcheck ACHS  - Diabetic diet  -Hypoglycemia protocol PRN        Paroxysmal atrial fibrillation  Patient with paroxymal afib  Resumed home OAC, rate controlled  Monitor on telemetry          Mixed hyperlipidemia  Last LDL was   Lab Results   Component Value Date    LDLCALC 177.6 (H) 12/03/2021      Patient is chronically on statin.will continue for now.             MDD (major depressive disorder), recurrent episode, moderate  Resumed zoloft, stable      Peripheral vascular disease  Resume statin         Final Active Diagnoses:    Diagnosis Date Noted POA    PRINCIPAL PROBLEM:  Infected wound [T14.8XXA, L08.9] 01/17/2022 Yes    Decubitus ulcer of sacral  region, unstageable [L89.150] 01/18/2022 Yes    S/P BKA (below knee amputation) unilateral, right [Z89.511] 12/30/2021 Not Applicable    Phantom limb syndrome with pain [G54.6] 07/12/2021 Yes    Chronic anemia [D64.9] 03/23/2021 Yes    Type 2 diabetes mellitus with diabetic nephropathy, with long-term current use of insulin [E11.21, Z79.4] 02/24/2021 Not Applicable    Paroxysmal atrial fibrillation [I48.0] 01/28/2020 Yes     Chronic    Mixed hyperlipidemia [E78.2] 12/13/2019 Yes     Chronic    MDD (major depressive disorder), recurrent episode, moderate [F33.1] 05/07/2018 Yes     Chronic    Peripheral vascular disease [I73.9] 10/06/2015 Yes      Problems Resolved During this Admission:       Discharged Condition: stable    Disposition: Rehab Facility    Follow Up:   Follow-up Information       Kaitlyn Rojas MD On 2/10/2022.    Specialties: General Surgery, Surgery  Why: 1:45 pm      - please report to MD office   Contact information:  [address]  [address] [place] [address]  [phone]               [facility].    Why: Rehab  Contact information:  [address] [address] [facility]  [facility] [id number] [phone]                         Patient Instructions:      Diet Cardiac     Diet diabetic     Activity as tolerated         Pending Diagnostic Studies:       Procedure Component Value Units Date/Time    Specimen to Pathology, Surgery General Surgery [[id number]] Collected: 01/24/22 1122    Order Status: Sent Lab Status: In process Updated: 01/24/22 1150           Medications:  Reconciled Home Medications:      Medication List        START taking these medications      MEROPENEM 1 G/100 ML NS (READY TO MIX SYSTEM)  Inject 100 mLs (1 g total) into the vein every 12 (twelve) hours. for 10 days     oxyCODONE-acetaminophen  mg per tablet  Commonly known as: PERCOCET  Take 1 tablet by mouth every 6 (six) hours as needed.            CHANGE how you take these  medications      insulin detemir U-100 100 unit/mL (3 mL) Inpn pen  Commonly known as: Levemir FLEXTOUCH  Inject 12 Units into the skin every evening.  What changed:   how much to take  when to take this            CONTINUE taking these medications      acetaminophen 500 MG tablet  Commonly known as: TYLENOL  Take 2 tablets (1,000 mg total) by mouth every 8 (eight) hours as needed for Pain.     allopurinoL 100 MG tablet  Commonly known as: ZYLOPRIM  Take 1 tablet (100 mg total) by mouth once daily.     apixaban 5 mg Tab  Commonly known as: ELIQUIS  Take 1 tablet (5 mg total) by mouth 2 (two) times daily.     atorvastatin 80 MG tablet  Commonly known as: LIPITOR  Take 1 tablet (80 mg total) by mouth every evening.     clopidogreL 75 mg tablet  Commonly known as: PLAVIX  Take 1 tablet (75 mg total) by mouth once daily.     DEXCOM G6  Misc  Generic drug: blood-glucose meter,continuous  Use to check blood sugars 4 times/day     DEXCOM G6 SENSOR Radha  Generic drug: blood-glucose sensor  Apply one sensor to skin every 10 days     DEXCOM G6 TRANSMITTER Radha  Generic drug: blood-glucose transmitter  Replace transmitter every 3 months to use with dexcom sensor     furosemide 40 MG tablet  Commonly known as: LASIX  Take 1 tablet (40 mg total) by mouth once daily.     HYDROcodone-acetaminophen 5-325 mg per tablet  Commonly known as: NORCO  Take 1 tablet by mouth every 4 (four) hours as needed for Pain.     insulin aspart U-100 100 unit/mL (3 mL) Inpn pen  Commonly known as: NovoLOG Flexpen U-100 Insulin  Inject 5 Units into the skin 3 (three) times daily with meals. If not eating, ok to hold     magnesium oxide 400 mg (241.3 mg magnesium) tablet  Commonly known as: MAG-OX  Take 1 tablet (400 mg total) by mouth 2 (two) times daily.     melatonin 3 mg tablet  Commonly known as: MELATIN  Take 2 tablets (6 mg total) by mouth nightly as needed for Insomnia.     mirtazapine 7.5 MG Tab  Commonly known as: REMERON  Take 2  tablets (15 mg total) by mouth nightly.     ONE-A-DAY WOMEN'S 50 PLUS ORAL  Take 1 tablet by mouth Daily.     pregabalin 50 MG capsule  Commonly known as: LYRICA  Take 3 capsules (150 mg total) by mouth 3 (three) times daily.     sertraline 50 MG tablet  Commonly known as: ZOLOFT  Take 1 tablet (50 mg total) by mouth once daily.     sodium bicarbonate 650 MG tablet  Take 1 tablet (650 mg total) by mouth 2 (two) times daily.            STOP taking these medications      amoxicillin-clavulanate 875-125mg 875-125 mg per tablet  Commonly known as: AUGMENTIN              Indwelling Lines/Drains at time of discharge:   Lines/Drains/Airways       Drain              Female External Urinary Catheter 12/25/21 1300 34 days                    Time spent on the discharge of patient: 35 minutes         Tawnya Montes MD  Department of Hospital Medicine  [facility]

## 2022-01-29 NOTE — PSYCH EVALUATION
Discharge orders noted. TN contacted Graciela BHATIA weekend on call liaison, Jose [phone]. Per jose, he will speak with [facility] MD at 10:00 am and let TN know if pt is good for transfer.    Janeth Rivera, RN Transitional Navigator  [phone]

## 2022-01-29 NOTE — PROGRESS NOTES
"Surgery follow up  /66 (Patient Position: Lying)   Pulse 94   Temp (!) 18 °F (-7.8 °C) (Oral)   Resp 18   Ht 5' 5" (1.651 m)   Wt 73.4 kg (161 lb 13.1 oz)   LMP 09/30/2015 (Exact Date)   SpO2 100%   BMI 26.93 kg/m²   I/O last 3 completed shifts:  In: 426.7 [Blood:326.7; IV Piggyback:100]  Out: 1 [Stool:1]  No intake/output data recorded.  Recent Results (from the past 336 hour(s))   CBC Auto Differential    Collection Time: 01/27/22  2:57 PM   Result Value Ref Range    WBC 10.77 3.90 - 12.70 K/uL    Hemoglobin 7.6 (L) 12.0 - 16.0 g/dL    Hematocrit 24.0 (L) 37.0 - 48.5 %    Platelets 346 150 - 450 K/uL   CBC Auto Differential    Collection Time: 01/26/22  7:32 AM   Result Value Ref Range    WBC 9.17 3.90 - 12.70 K/uL    Hemoglobin 6.2 (L) 12.0 - 16.0 g/dL    Hematocrit 20.9 (L) 37.0 - 48.5 %    Platelets 339 150 - 450 K/uL   CBC Auto Differential    Collection Time: 01/22/22  3:35 AM   Result Value Ref Range    WBC 7.40 3.90 - 12.70 K/uL    Hemoglobin 8.1 (L) 12.0 - 16.0 g/dL    Hematocrit 27.1 (L) 37.0 - 48.5 %    Platelets 268 150 - 450 K/uL     Wound better minimal drainage , continue present treatment will follow in office post discharge  "

## 2022-01-29 NOTE — PROGRESS NOTES
[facility] Medicine  Progress Note    Patient Name: Suyapa Connelly  MRN: [id number]  Patient Class: IP- Inpatient   Admission Date: 1/17/2022  Length of Stay: 9 days  Attending Physician: Tawnya Montes*  Primary Care Provider: Magen Christensen MD        Subjective:     Principal Problem:Infected wound        HPI:  Suyapa Connelly is a 57 y/o female with PMHx CKD3, DMII, GERD, HLD, HTN, osteomyelitis, PVD, depression, and anxiety presents to ED with  with continue concern for wound infection.  Patient underwent right BKA by Dr. Rojas on 12/28 then discharged to rehab facility on 1/10.  Rehab facility staff became concerned for possible wound infection and patient was sent back to ED on 1/11.  Lab work was obtained, and images of wound were reviewed with Dr. Rojas, who recommended continued outpatient management.  At discharge patient was prescribed Augmentin, which she has continued taking as instructed.   at bedside is concern for progressively worsening appearance of wound, along with continued drainage.  Patient reporting worsened pain today described as sharp, worse with touch, radiating towards right knee, severity 8/10.  No fevers, chills, nausea, vomiting, chest pain, cough, shortness of breath, abdominal pain, urinary or bowel complications.  She does admit to not taking her BP medication today.  No other acute complaints at this time. Will admit to hospital medicine for observation services. General surgery consulted.       Overview/Hospital Course:  Pt placed in observation for evaluation of wound infection.  Pt s/p R BKA with Dr. Rojas on 12/28/21.  Vanc and cefazolin initiated.  Wound culture presumptive pseudomonas species cefazolin discontinued and changed to cefepime.  UC candida and BC 1 of 2 bottles coag-neg staph suspected contaminant.  Gen Surg consulted and recommended santyl, xeroform, 4 x 4, kerlix and ace bandage daily.  Wound culture pseudomonas  sensitive to cefepime, GNR with I&S pending.    PT/OT recommended rehab facility.  WC consulted and unstageable sacral decub.  Recommended waffle overlay and triad ointment to L buttocks BID and prn incontinent episode.      Interval History:  Awake and alert, no acute events overnight, s/p debridement right BKA 1/24  Appreciate ID recs continue  IV antibiotics x 2 weeks of past the R BKA stump revision surgery on 1/24/22. (stop date  February 7, 2022.   H/H stable  Medically ready for discharge  Awaiting placement    Review of Systems   Constitutional: Negative for chills, diaphoresis, fatigue and fever.   HENT: Negative for hearing loss and sore throat.    Respiratory: Negative for cough and shortness of breath.    Cardiovascular: Negative for chest pain and leg swelling.   Gastrointestinal: Negative for abdominal pain, diarrhea, nausea and vomiting.   Genitourinary: Negative for difficulty urinating and flank pain.   Musculoskeletal: Negative for back pain.   Skin: Positive for wound. Negative for rash.   Neurological: Negative for dizziness, weakness and headaches.   Psychiatric/Behavioral: Negative for agitation and confusion.     Objective:     Vital Signs (Most Recent):  Temp: 98.2 °F (36.8 °C) (01/29/22 0721)  Pulse: 98 (01/29/22 0721)  Resp: 19 (01/29/22 0721)  BP: 136/69 (01/29/22 0721)  SpO2: (!) 94 % (01/29/22 0803) Vital Signs (24h Range):  Temp:  [98.2 °F (36.8 °C)-99.1 °F (37.3 °C)] 98.2 °F (36.8 °C)  Pulse:  [89-99] 98  Resp:  [18-20] 19  SpO2:  [91 %-100 %] 94 %  BP: (110-136)/(53-80) 136/69     Weight: 77.5 kg (170 lb 13.7 oz)  Body mass index is 28.43 kg/m².    Intake/Output Summary (Last 24 hours) at 1/29/2022 1135  Last data filed at 1/29/2022 0800  Gross per 24 hour   Intake 240 ml   Output --   Net 240 ml      Physical Exam  Vitals and nursing note reviewed.   Constitutional:       General: She is not in acute distress.     Appearance: She is obese. She is not ill-appearing.   HENT:      Head:  Normocephalic and atraumatic.   Cardiovascular:      Rate and Rhythm: Normal rate and regular rhythm.      Pulses: Normal pulses.      Heart sounds: Normal heart sounds.   Pulmonary:      Effort: Pulmonary effort is normal.   Abdominal:      General: Bowel sounds are normal.      Palpations: Abdomen is soft.   Musculoskeletal:         General: No swelling.      Cervical back: Normal range of motion and neck supple.      Right Lower Extremity: Right leg is amputated below knee.      Left Lower Extremity: Left leg is amputated below knee.   Feet:      Right foot:      Skin integrity: No erythema or warmth.      Comments: S/p right BKA 12/28.   Wound dressing intact  Skin:     General: Skin is warm and dry.      Capillary Refill: Capillary refill takes less than 2 seconds.   Neurological:      General: No focal deficit present.      Mental Status: She is alert and oriented to person, place, and time.   Psychiatric:         Mood and Affect: Mood normal.         Behavior: Behavior normal. Behavior is cooperative.         Significant Labs: All pertinent labs within the past 24 hours have been reviewed.    Significant Imaging: I have reviewed all pertinent imaging results/findings within the past 24 hours.      Assessment/Plan:      * Infected wound  S/p right bka with Dr. Rojas on 12/28/21  Presents to ED with concerns for wound infection  BKA site with dehisced wound  Received zosyn x1  Vancomycin and cefazolin initiated, cultures pseudomonas.  Cefazolin changed to cefepime and Vanc.  General surgery consulted for possible debridement: check cultures , apply santyl , xeroform , 4 x 4 , kerlix , and ace bandage daily, iv antibiotics  ID consulted for recommendations on antibiotic therapy: vanc, cefepime.  Await final cultures. DC vancomycin on 1/24 and continue meropenem 1/24 pending final culture  plan 2 weeks of IV antibiotics past the R BKA stump revision surgery on 1/24/22. So stop date February 7, 2022.   Case  Management trying to place patient   Need to get cbc and cmp at least weekly while on antibiotics  If needed can fax labs to Dr. Espino at [phone]   PT/OT: rehab      Decubitus ulcer of sacral region, unstageable  Wound Care assistance appreciated  Recommended waffle overlay and triad ointment to L buttocks BID and prn incontinent episode.    S/P BKA (below knee amputation) unilateral, right  See above      Phantom limb syndrome with pain  Resume home lyrica  Norco q4hr prn     Chronic anemia  Stable and at baseline      Type 2 diabetes mellitus with diabetic nephropathy, with long-term current use of insulin  Hemoglobin A1C   Date Value Ref Range Status   12/03/2021 9.6 (H) 4.0 - 5.6 % Final     Comment:     ADA Screening Guidelines:  5.7-6.4%  Consistent with prediabetes  >or=6.5%  Consistent with diabetes    High levels of fetal hemoglobin interfere with the HbA1C  assay. Heterozygous hemoglobin variants (HbS, HgC, etc)do  not significantly interfere with this assay.   However, presence of multiple variants may affect accuracy.                                       Patient's FSGs are uncontrolled due to hyperglycemia on current medication regimen.  - home regimen includes detemir, aspart  - hold home oral medications  - increased scheduled doses: detemir 10U qhs, aspart 2U tid plus scale will increase to 5U tid. Controlled.  - LDSSI with ACCUcheck ACHS  - Diabetic diet  -Hypoglycemia protocol PRN        Paroxysmal atrial fibrillation  Patient with paroxymal afib  Resumed home OAC, rate controlled  Monitor on telemetry          Mixed hyperlipidemia  Last LDL was   Lab Results   Component Value Date    LDLCALC 177.6 (H) 12/03/2021      Patient is chronically on statin.will continue for now.             MDD (major depressive disorder), recurrent episode, moderate  Resumed zoloft, stable      Peripheral vascular disease  Resume statin         VTE Risk Mitigation (From admission, onward)         Ordered     apixaban  tablet 5 mg  2 times daily         01/17/22 1920     IP VTE HIGH RISK PATIENT  Once         01/17/22 1658     Place sequential compression device  Until discontinued         01/17/22 1658                Discharge Planning   DEVAN: 1/29/2022     Code Status: Full Code   Is the patient medically ready for discharge?:     Reason for patient still in hospital (select all that apply): Patient trending condition  Discharge Plan A: Rehab   Discharge Delays: None known at this time        Tawnya Montes MD  Department of Hospital Medicine   [facility] Surg

## 2022-01-29 NOTE — PLAN OF CARE
Floor nurse to call report to [facility] -[phone], Transfer packet at nurses station with pt's blue chart. Stretcher transport order placed with [facility], TN left VM for pt's , Randell cosby [phone] to call TN back regarding pt's d/c.    Future Appointments   Date Time Provider Department Center   2/15/2022  1:45 PM [facility]   2/24/2022 10:00 AM Kaitlyn Rojas MD [facility] W[facility]   4/4/2022  2:30 PM Fawad Jameson MD [facility] [place]       Pt's nurse will go over medications/signs and symptoms prior to discharge       01/29/22 1510   Final Note   Assessment Type Final Discharge Note   Anticipated Discharge Disposition Rehab  ([facility])   What phone number can be called within the next 1-3 days to see how you are doing after discharge? [phone]   Hospital Resources/Appts/Education Provided Appointments scheduled and added to AVS   Post-Acute Status   Post-Acute Authorization Other   Post-Acute Placement Status Set-up Complete/Auth obtained  ([facility])   Discharge Delays (!) [facility] Arranged Transportation     Janeth Rivera, RN Transitional Navigator  [phone]

## 2022-01-29 NOTE — ASSESSMENT & PLAN NOTE
S/p right bka with Dr. Rojas on 12/28/21  Presents to ED with concerns for wound infection  BKA site with dehisced wound  Received zosyn x1  Vancomycin and cefazolin initiated, cultures pseudomonas.  Cefazolin changed to cefepime and Vanc.  General surgery consulted for possible debridement: check cultures , apply santyl , xeroform , 4 x 4 , kerlix , and ace bandage daily, iv antibiotics  ID consulted for recommendations on antibiotic therapy: vanc, cefepime.  Await final cultures. DC vancomycin on 1/24 and continue meropenem 1/24 pending final culture  plan 2 weeks of IV antibiotics past the R BKA stump revision surgery on 1/24/22. So stop date February 7, 2022.   Case Management trying to place patient   Need to get cbc and cmp at least weekly while on antibiotics  If needed can fax labs to Dr. Espino at [phone]   PT/OT: rehab

## 2022-01-29 NOTE — PROGRESS NOTES
"Surgery follow up  /69   Pulse 98   Temp 98.2 °F (36.8 °C)   Resp 19   Ht 5' 5" (1.651 m)   Wt 77.5 kg (170 lb 13.7 oz)   LMP 09/30/2015 (Exact Date)   SpO2 (!) 94%   BMI 28.43 kg/m²   I/O last 3 completed shifts:  In: 100 [IV Piggyback:100]  Out: -   I/O this shift:  In: 240 [P.O.:240]  Out: -       Recent Results (from the past 336 hour(s))   CBC Auto Differential    Collection Time: 01/27/22  2:57 PM   Result Value Ref Range    WBC 10.77 3.90 - 12.70 K/uL    Hemoglobin 7.6 (L) 12.0 - 16.0 g/dL    Hematocrit 24.0 (L) 37.0 - 48.5 %    Platelets 346 150 - 450 K/uL   CBC Auto Differential    Collection Time: 01/26/22  7:32 AM   Result Value Ref Range    WBC 9.17 3.90 - 12.70 K/uL    Hemoglobin 6.2 (L) 12.0 - 16.0 g/dL    Hematocrit 20.9 (L) 37.0 - 48.5 %    Platelets 339 150 - 450 K/uL   CBC Auto Differential    Collection Time: 01/22/22  3:35 AM   Result Value Ref Range    WBC 7.40 3.90 - 12.70 K/uL    Hemoglobin 8.1 (L) 12.0 - 16.0 g/dL    Hematocrit 27.1 (L) 37.0 - 48.5 %    Platelets 268 150 - 450 K/uL     Recent Results (from the past 336 hour(s))   Basic Metabolic Panel    Collection Time: 01/22/22  3:35 AM   Result Value Ref Range    Sodium 136 136 - 145 mmol/L    Potassium 4.6 3.5 - 5.1 mmol/L    Chloride 105 95 - 110 mmol/L    CO2 23 23 - 29 mmol/L    BUN 46 (H) 6 - 20 mg/dL    Creatinine 1.3 0.5 - 1.4 mg/dL    Calcium 8.2 (L) 8.7 - 10.5 mg/dL    Anion Gap 8 8 - 16 mmol/L   Basic Metabolic Panel    Collection Time: 01/21/22  4:42 AM   Result Value Ref Range    Sodium 135 (L) 136 - 145 mmol/L    Potassium 3.5 3.5 - 5.1 mmol/L    Chloride 105 95 - 110 mmol/L    CO2 20 (L) 23 - 29 mmol/L    BUN 34 (H) 6 - 20 mg/dL    Creatinine 1.1 0.5 - 1.4 mg/dL    Calcium 8.6 (L) 8.7 - 10.5 mg/dL    Anion Gap 10 8 - 16 mmol/L   Basic Metabolic Panel    Collection Time: 01/20/22  5:08 AM   Result Value Ref Range    Sodium 138 136 - 145 mmol/L    Potassium 3.8 3.5 - 5.1 mmol/L    Chloride 106 95 - 110 mmol/L    " CO2 23 23 - 29 mmol/L    BUN 30 (H) 6 - 20 mg/dL    Creatinine 1.0 0.5 - 1.4 mg/dL    Calcium 8.4 (L) 8.7 - 10.5 mg/dL    Anion Gap 9 8 - 16 mmol/L   doing better on iv antibiotics  And local wound care for transfer to Rehab center today.

## 2022-01-29 NOTE — PT/OT/SLP PROGRESS
Physical Therapy      Patient Name:  Suyapa Connelly   MRN:  [id number]    Patient not seen today secondary to Other (Comment) (Pt awaiting transport to [facility]

## 2022-01-29 NOTE — PROGRESS NOTES
"[facility] - Med Surg  Adult Nutrition  Progress Note    SUMMARY       Recommendations    Recommendation:   1. Encourage intake of meals & supplements as tolerated.  2. Monitor weight/labs.   3. RD to continue to follow to monitor po intake    Goals:   Pt will tolerate diet with at least 50-75% intake at meals by RD follow up  Nutrition Goal Status: progressing towards goal  Communication of RD Recs: reviewed with RN    Assessment and Plan  * Infected wound  Contributing Nutrition Diagnosis  Increased energy/protein needs    Related to (etiology):   Wound healing    Signs and Symptoms (as evidenced by):   Infected amputation stump    Interventions:  Collaboration with other providers  Commercial beverage  Increased protein diet    Nutrition Diagnosis Status:   Continues      Malnutrition Assessment  Subcutaneous Fat Loss (Final Summary): well nourished  Muscle Loss Evaluation (Final Summary): well nourished       Reason for Assessment  Reason For Assessment: RD follow-up  Diagnosis:  (infected wound)  Relevant Medical History: GERD, HTN, hyperlipemia, DM, MI, PVD, CKD, heart cath, CABG, thrombectomy, L AKA, R BKA  General Information Comments: Pt on 2000cal ADA diet with beneprotein and Boost Glucose. Pt with fair intake at recent meals. Carlos 15- R knee/leg incision, L buttocks wound. s/p debridement 1/24. Noted plan for d/c to rehab. NFPE completed 1/18-nourished. No weight loss noted since admit.  Nutrition Discharge Planning: pt to d/c on ADA diet    Nutrition Risk Screen  Nutrition Risk Screen: large or nonhealing wound, burn or pressure injury    Nutrition/Diet History  Food Preferences: no Christianity or cultural food prefs identified  Spiritual, Cultural Beliefs, Zoroastrian Practices, Values that Affect Care: no  Factors Affecting Nutritional Intake: depression    Anthropometrics  Temp: 98.2 °F (36.8 °C)  Height Method: Stated  Height: 5' 5" (165.1 cm)  Height (inches): 65 in  Weight Method: Bed Scale  Weight: " 73.4 kg (161 lb 13.1 oz)  Weight (lb): 161.82 lb  Ideal Body Weight (IBW), Female: 125 lb  % Ideal Body Weight, Female (lb): 129.46 %  BMI (Calculated): 26.9  BMI Grade: 25 - 29.9 - overweight  Amputation %: 5.9,16  Total Amputation %: 21.9  Amputation Ideal Body Weight (IBW), Female (lb): 103.1 lb  Amputee BMI (kg/m2): 35.89 kg/m2     Lab/Procedures/Meds  Pertinent Labs Reviewed: reviewed  Pertinent Labs Comments: BUN 61H, Glu 113H, Alb 1.8L  Pertinent Medications Reviewed: reviewed  Pertinent Medications Comments: Lasix, insulin    Estimated/Assessed Needs  Weight Used For Calorie Calculations: 73.4 kg (161 lb 13.1 oz)  Energy Calorie Requirements (kcal): 6114-8714 (25-30 kcal/kg with amputation %)  Energy Need Method: Kcal/kg  Protein Requirements: 69g (1.2g/kg with amputation %)  Weight Used For Protein Calculations: 73.4 kg (161 lb 13.1 oz)  Estimated Fluid Requirement Method: RDA Method  RDA Method (mL): 1434     Nutrition Prescription Ordered  Current Diet Order: 2000cal ADA  Oral Nutrition Supplement: beneprotein, Boost Glucose    Evaluation of Received Nutrient/Fluid Intake  I/O: 100/1  Energy Calories Required: meeting needs  Protein Required: meeting needs  Fluid Required: meeting needs  Comments: LBM 1/27  % Intake of Estimated Energy Needs: 25 - 50 %  % Meal Intake: 25 - 50 %    Nutrition Risk  Level of Risk/Frequency of Follow-up:  (2xweekly)     Monitor and Evaluation  Food and Nutrient Intake: food and beverage intake  Food and Nutrient Adminstration: diet order  Physical Activity and Function: nutrition-related ADLs and IADLs  Anthropometric Measurements: weight  Biochemical Data, Medical Tests and Procedures: electrolyte and renal panel  Nutrition-Focused Physical Findings: overall appearance     Nutrition Follow-Up    RD Follow-up?: Yes

## 2022-01-29 NOTE — SUBJECTIVE & OBJECTIVE
Interval History:  Awake and alert, no acute events overnight, s/p debridement right BKA 1/24  Appreciate ID recs continue  IV antibiotics x 2 weeks of past the R BKA stump revision surgery on 1/24/22. (stop date  February 7, 2022.   H/H stable  Medically ready for discharge  Awaiting placement    Review of Systems   Constitutional: Negative for chills, diaphoresis, fatigue and fever.   HENT: Negative for hearing loss and sore throat.    Respiratory: Negative for cough and shortness of breath.    Cardiovascular: Negative for chest pain and leg swelling.   Gastrointestinal: Negative for abdominal pain, diarrhea, nausea and vomiting.   Genitourinary: Negative for difficulty urinating and flank pain.   Musculoskeletal: Negative for back pain.   Skin: Positive for wound. Negative for rash.   Neurological: Negative for dizziness, weakness and headaches.   Psychiatric/Behavioral: Negative for agitation and confusion.     Objective:     Vital Signs (Most Recent):  Temp: 98.2 °F (36.8 °C) (01/29/22 0721)  Pulse: 98 (01/29/22 0721)  Resp: 19 (01/29/22 0721)  BP: 136/69 (01/29/22 0721)  SpO2: (!) 94 % (01/29/22 0803) Vital Signs (24h Range):  Temp:  [98.2 °F (36.8 °C)-99.1 °F (37.3 °C)] 98.2 °F (36.8 °C)  Pulse:  [89-99] 98  Resp:  [18-20] 19  SpO2:  [91 %-100 %] 94 %  BP: (110-136)/(53-80) 136/69     Weight: 77.5 kg (170 lb 13.7 oz)  Body mass index is 28.43 kg/m².    Intake/Output Summary (Last 24 hours) at 1/29/2022 1135  Last data filed at 1/29/2022 0800  Gross per 24 hour   Intake 240 ml   Output --   Net 240 ml      Physical Exam  Vitals and nursing note reviewed.   Constitutional:       General: She is not in acute distress.     Appearance: She is obese. She is not ill-appearing.   HENT:      Head: Normocephalic and atraumatic.   Cardiovascular:      Rate and Rhythm: Normal rate and regular rhythm.      Pulses: Normal pulses.      Heart sounds: Normal heart sounds.   Pulmonary:      Effort: Pulmonary effort is normal.    Abdominal:      General: Bowel sounds are normal.      Palpations: Abdomen is soft.   Musculoskeletal:         General: No swelling.      Cervical back: Normal range of motion and neck supple.      Right Lower Extremity: Right leg is amputated below knee.      Left Lower Extremity: Left leg is amputated below knee.   Feet:      Right foot:      Skin integrity: No erythema or warmth.      Comments: S/p right BKA 12/28.   Wound dressing intact  Skin:     General: Skin is warm and dry.      Capillary Refill: Capillary refill takes less than 2 seconds.   Neurological:      General: No focal deficit present.      Mental Status: She is alert and oriented to person, place, and time.   Psychiatric:         Mood and Affect: Mood normal.         Behavior: Behavior normal. Behavior is cooperative.         Significant Labs: All pertinent labs within the past 24 hours have been reviewed.    Significant Imaging: I have reviewed all pertinent imaging results/findings within the past 24 hours.

## 2022-01-29 NOTE — PT/OT/SLP PROGRESS
Physical Therapy Treatment    Patient Name:  Suyapa Connelly   MRN:  [id number]    Recommendations:     Discharge Recommendations:  rehabilitation facility   Discharge Equipment Recommendations:  (TBD)   Barriers to discharge: None    Assessment:     Suyapa Connelly is a 56 y.o. female admitted with a medical diagnosis of Infected wound.  She presents with the following impairments/functional limitations:  weakness,impaired endurance,impaired self care skills,decreased lower extremity function,decreased upper extremity function,impaired balance,gait instability,impaired functional mobilty,pain,impaired skin,decreased ROM,edema,impaired cardiopulmonary response to activity Patient seen earlier in AM for LE ex and then later in AM for mobility; limited mobility/participation 2/2 increased drowsiness; will cont with POC..    Rehab Prognosis: Good; patient would benefit from acute skilled PT services to address these deficits and reach maximum level of function.    Recent Surgery: Procedure(s) (LRB):  REVISION-STUMP (Right) 4 Days Post-Op    Plan:     During this hospitalization, patient to be seen 6 x/week to address the identified rehab impairments via therapeutic activities,therapeutic exercises,neuromuscular re-education and progress toward the following goals:    · Plan of Care Expires:  02/25/22    Subjective     Pain/Comfort:  · Pain Rating 1:  (did not rate, crying intermittently, at times yelling)  · Location - Side 1: Right  · Location - Orientation 1: lower  · Location 1: leg  · Pain Addressed 1: Pre-medicate for activity,Reposition,Distraction,Cessation of Activity,Nurse notified, light stoking/tapping to residual limb  · Pain Rating Post-Intervention 1:  (pt did not rate)      Objective:     Communicated with nurse prior to session.  Patient found HOB elevated with bed alarm,PICC line upon PT entry to room.     General Precautions: Standard, fall,contact,diabetic   Orthopedic Precautions: ((R) BKA, (L) AKA  (old))   Braces: N/A  Respiratory Status: Room air     Functional Mobility:  · Bed Mobility: in  PM  · Rolling Left:  maximal assistance and of 2 persons  · Rolling Right: contact guard assistance and minimal assistance with use of side rail in AM  · Scooting: maximal assistance, total assistance, of 2 persons   · Supine to Sit: maximal assistance, total assistance and of 2 persons  · Sit to Supine: maximal assistance, total assistance and of 2 persons      AM-PAC 6 CLICK MOBILITY  Turning over in bed (including adjusting bedclothes, sheets and blankets)?: 3  Sitting down on and standing up from a chair with arms (e.g., wheelchair, bedside commode, etc.): 1  Moving from lying on back to sitting on the side of the bed?: 2  Moving to and from a bed to a chair (including a wheelchair)?: 2  Need to walk in hospital room?: 1  Climbing 3-5 steps with a railing?: 1  Basic Mobility Total Score: 10       Therapeutic Activities and Exercises:   3810-7302 Patient drowsy from pain meds and required increased VCs/stimulation for continued participation; tolerated BLE AAROM ex 2x 10 reps with tapping facilitation where appropriate- hip fl/ext, hip abd/add, knee fl/ext (RLE), QS, GS; laid pt flat for stretching of hip fl- placing roll under R and L hip to increase hip ext statically 2-3 minutes; pt rolled to R side with use of side rail with CG/Sid; propped with pillows to position on R side.  [id number]:  Patient with continued drowsiness, difficult to arouse; nurse came to room to give pt juice; seemed to help, but pt still with drowsiness, decreased alertness and command following; pt transitioned to EOB with max/total A x 2; pt propped self with BUEs behind pt; pt performed reaching activity with L and R with focus on forward wt shift, midline sitting and trunk control; pt had posterior leaning in sitting, difficulty maintaining alertness/drifting asleep and needed increased VCs to maintain wakefulness; returned to supine and  HOB elevated for lunch.           Patient left HOB elevated with all lines intact, call button in reach, bed alarm on and nurse notified..    GOALS:   Multidisciplinary Problems     Physical Therapy Goals        Problem: Physical Therapy Goal    Goal Priority Disciplines Outcome Goal Variances Interventions   Physical Therapy Goal     PT, PT/OT Ongoing, Progressing     Description: Goals to be met by: 22     Patient will increase functional independence with mobility by performin. Supine <> sit with Stand-by Assistance  2. Bed to chair transfer with Minimal Assistance using scoot transfer or slideboard/beasy board  3. Wheelchair propulsion x 50 feet with Stand-by Assistance using bilateral upper extremities MET 2022    Updated  3. Wheelchair propulsion x 150 feet with Stand-by Assistance using bilateral upper extremities                     Time Tracking:     PT Received On: 22  PT Start Time: 1145 (0958)     PT Stop Time: 1223 (1043)  PT Total Time (min): 38 min in pm; 45 min in AM    Billable Minutes: Therapeutic Exercise 45 in AM; Therapeutic Activity 23 in PM    Treatment Type: Treatment  PT/PTA: PT     PTA Visit Number: 0     2022

## 2022-01-29 NOTE — PLAN OF CARE
Problem: Physical Therapy Goal  Goal: Physical Therapy Goal  Description: Goals to be met by: 22     Patient will increase functional independence with mobility by performin. Supine <> sit with Stand-by Assistance  2. Bed to chair transfer with Minimal Assistance using scoot transfer or slideboard/beasy board  3. Wheelchair propulsion x 50 feet with Stand-by Assistance using bilateral upper extremities MET 2022    Updated  3. Wheelchair propulsion x 150 feet with Stand-by Assistance using bilateral upper extremities    Outcome: Ongoing, Progressing   Patient seen earlier in AM for LE ex and then later in AM for mobility; limited mobility/participation 2/2 increased drowsiness; will cont with POC.

## 2022-01-30 NOTE — PROGRESS NOTES
Pt left safely via stretcher and ambulance. Midline CDI. Night time medications and Meropenem given. Belongings and waffle mattress with pt.

## 2022-01-31 ENCOUNTER — HOSPITAL ENCOUNTER (INPATIENT)
Facility: HOSPITAL | Age: 57
LOS: 9 days | Discharge: NURSING, REHAB OR LONG-TERM CARE | DRG: 862 | End: 2022-02-10
Attending: EMERGENCY MEDICINE | Admitting: STUDENT IN AN ORGANIZED HEALTH CARE EDUCATION/TRAINING PROGRAM
Payer: MEDICARE

## 2022-01-31 ENCOUNTER — HOSPITAL ENCOUNTER (OUTPATIENT)
Dept: RADIOLOGY | Facility: HOSPITAL | Age: 57
Discharge: HOME | End: 2022-01-31
Attending: FAMILY MEDICINE
Payer: MEDICARE

## 2022-01-31 DIAGNOSIS — R65.20 SEVERE SEPSIS: ICD-10-CM

## 2022-01-31 DIAGNOSIS — A41.9 SEPSIS WITH ENCEPHALOPATHY WITHOUT SEPTIC SHOCK, DUE TO UNSPECIFIED ORGANISM: Primary | ICD-10-CM

## 2022-01-31 DIAGNOSIS — Z89.511 S/P BKA (BELOW KNEE AMPUTATION) UNILATERAL, RIGHT: ICD-10-CM

## 2022-01-31 DIAGNOSIS — E87.5 HYPERKALEMIA: ICD-10-CM

## 2022-01-31 DIAGNOSIS — R65.20 SEPSIS WITH ENCEPHALOPATHY WITHOUT SEPTIC SHOCK, DUE TO UNSPECIFIED ORGANISM: Primary | ICD-10-CM

## 2022-01-31 DIAGNOSIS — R41.82 ALTERED MENTAL STATUS: ICD-10-CM

## 2022-01-31 DIAGNOSIS — N17.9 AKI (ACUTE KIDNEY INJURY): ICD-10-CM

## 2022-01-31 DIAGNOSIS — G93.41 SEPSIS WITH ENCEPHALOPATHY WITHOUT SEPTIC SHOCK, DUE TO UNSPECIFIED ORGANISM: Primary | ICD-10-CM

## 2022-01-31 DIAGNOSIS — N18.4 CKD (CHRONIC KIDNEY DISEASE), STAGE IV: ICD-10-CM

## 2022-01-31 DIAGNOSIS — E88.09 HYPOALBUMINEMIA: ICD-10-CM

## 2022-01-31 DIAGNOSIS — A41.9 SEVERE SEPSIS: ICD-10-CM

## 2022-01-31 PROBLEM — S78.119A AMPUTATION ABOVE KNEE: Status: RESOLVED | Noted: 2021-06-08 | Resolved: 2022-01-31

## 2022-01-31 PROBLEM — I10 ESSENTIAL HYPERTENSION: Chronic | Status: RESOLVED | Noted: 2018-05-05 | Resolved: 2022-01-31

## 2022-01-31 PROBLEM — G25.3 MYOCLONUS: Status: ACTIVE | Noted: 2022-01-31

## 2022-01-31 LAB
ABO + RH BLD: NORMAL
ALBUMIN SERPL BCP-MCNC: 1.9 G/DL (ref 3.5–5.2)
ALP SERPL-CCNC: 117 U/L (ref 55–135)
ALT SERPL W/O P-5'-P-CCNC: 15 U/L (ref 10–44)
ANION GAP SERPL CALC-SCNC: 12 MMOL/L (ref 8–16)
ANION GAP SERPL CALC-SCNC: 13 MMOL/L (ref 8–16)
AST SERPL-CCNC: 33 U/L (ref 10–40)
BACTERIA #/AREA URNS AUTO: ABNORMAL /HPF
BASOPHILS # BLD AUTO: 0.08 K/UL (ref 0–0.2)
BASOPHILS NFR BLD: 0.5 % (ref 0–1.9)
BILIRUB SERPL-MCNC: 0.3 MG/DL (ref 0.1–1)
BILIRUB UR QL STRIP: NEGATIVE
BLD GP AB SCN CELLS X3 SERPL QL: NORMAL
BUN SERPL-MCNC: 62 MG/DL (ref 6–20)
BUN SERPL-MCNC: 64 MG/DL (ref 6–20)
CALCIUM SERPL-MCNC: 9.1 MG/DL (ref 8.7–10.5)
CALCIUM SERPL-MCNC: 9.7 MG/DL (ref 8.7–10.5)
CHLORIDE SERPL-SCNC: 100 MMOL/L (ref 95–110)
CHLORIDE SERPL-SCNC: 103 MMOL/L (ref 95–110)
CLARITY UR REFRACT.AUTO: ABNORMAL
CO2 SERPL-SCNC: 24 MMOL/L (ref 23–29)
CO2 SERPL-SCNC: 26 MMOL/L (ref 23–29)
COLOR UR AUTO: YELLOW
CREAT SERPL-MCNC: 1.9 MG/DL (ref 0.5–1.4)
CREAT SERPL-MCNC: 2.1 MG/DL (ref 0.5–1.4)
CREAT UR-MCNC: 63 MG/DL (ref 15–325)
CRP SERPL-MCNC: 122.5 MG/L (ref 0–8.2)
CTP QC/QA: YES
DIFFERENTIAL METHOD: ABNORMAL
EOSINOPHIL # BLD AUTO: 0.1 K/UL (ref 0–0.5)
EOSINOPHIL NFR BLD: 1 % (ref 0–8)
ERYTHROCYTE [DISTWIDTH] IN BLOOD BY AUTOMATED COUNT: 14 % (ref 11.5–14.5)
ERYTHROCYTE [SEDIMENTATION RATE] IN BLOOD BY WESTERGREN METHOD: 38 MM/HR (ref 0–36)
EST. GFR  (AFRICAN AMERICAN): 29.9 ML/MIN/1.73 M^2
EST. GFR  (AFRICAN AMERICAN): 33.7 ML/MIN/1.73 M^2
EST. GFR  (NON AFRICAN AMERICAN): 25.9 ML/MIN/1.73 M^2
EST. GFR  (NON AFRICAN AMERICAN): 29.3 ML/MIN/1.73 M^2
GLUCOSE SERPL-MCNC: 79 MG/DL (ref 70–110)
GLUCOSE SERPL-MCNC: 86 MG/DL (ref 70–110)
GLUCOSE UR QL STRIP: ABNORMAL
HCT VFR BLD AUTO: 22.2 % (ref 37–48.5)
HGB BLD-MCNC: 6.6 G/DL (ref 12–16)
HGB UR QL STRIP: NEGATIVE
HYALINE CASTS UR QL AUTO: 0 /LPF
IMM GRANULOCYTES # BLD AUTO: 0.05 K/UL (ref 0–0.04)
IMM GRANULOCYTES NFR BLD AUTO: 0.3 % (ref 0–0.5)
KETONES UR QL STRIP: ABNORMAL
LACTATE SERPL-SCNC: 0.7 MMOL/L (ref 0.5–2.2)
LACTATE SERPL-SCNC: 1 MMOL/L (ref 0.5–2.2)
LEUKOCYTE ESTERASE UR QL STRIP: ABNORMAL
LYMPHOCYTES # BLD AUTO: 1.7 K/UL (ref 1–4.8)
LYMPHOCYTES NFR BLD: 11.7 % (ref 18–48)
MAGNESIUM SERPL-MCNC: 2.7 MG/DL (ref 1.6–2.6)
MCH RBC QN AUTO: 30.3 PG (ref 27–31)
MCHC RBC AUTO-ENTMCNC: 29.7 G/DL (ref 32–36)
MCV RBC AUTO: 102 FL (ref 82–98)
MICROSCOPIC COMMENT: ABNORMAL
MONOCYTES # BLD AUTO: 1.2 K/UL (ref 0.3–1)
MONOCYTES NFR BLD: 7.9 % (ref 4–15)
NEUTROPHILS # BLD AUTO: 11.6 K/UL (ref 1.8–7.7)
NEUTROPHILS NFR BLD: 78.6 % (ref 38–73)
NITRITE UR QL STRIP: NEGATIVE
NRBC BLD-RTO: 0 /100 WBC
PH UR STRIP: 6 [PH] (ref 5–8)
PHOSPHATE SERPL-MCNC: 7.1 MG/DL (ref 2.7–4.5)
PLATELET # BLD AUTO: 386 K/UL (ref 150–450)
PMV BLD AUTO: 11.9 FL (ref 9.2–12.9)
POCT GLUCOSE: 87 MG/DL (ref 70–110)
POTASSIUM SERPL-SCNC: 4.9 MMOL/L (ref 3.5–5.1)
POTASSIUM SERPL-SCNC: 5.4 MMOL/L (ref 3.5–5.1)
PROCALCITONIN SERPL IA-MCNC: 0.38 NG/ML
PROT SERPL-MCNC: 8.1 G/DL (ref 6–8.4)
PROT UR QL STRIP: ABNORMAL
RBC # BLD AUTO: 2.18 M/UL (ref 4–5.4)
RBC #/AREA URNS AUTO: 6 /HPF (ref 0–4)
SARS-COV-2 RDRP RESP QL NAA+PROBE: NEGATIVE
SODIUM SERPL-SCNC: 139 MMOL/L (ref 136–145)
SODIUM SERPL-SCNC: 139 MMOL/L (ref 136–145)
SODIUM UR-SCNC: 69 MMOL/L (ref 20–250)
SP GR UR STRIP: 1.01 (ref 1–1.03)
SQUAMOUS #/AREA URNS AUTO: 6 /HPF
TROPONIN I SERPL DL<=0.01 NG/ML-MCNC: 0.01 NG/ML (ref 0–0.03)
URN SPEC COLLECT METH UR: ABNORMAL
UUN UR-MCNC: 368 MG/DL (ref 140–1050)
WBC # BLD AUTO: 14.72 K/UL (ref 3.9–12.7)
WBC #/AREA URNS AUTO: 53 /HPF (ref 0–5)

## 2022-01-31 PROCEDURE — 82962 GLUCOSE BLOOD TEST: CPT | Mod: HCNC

## 2022-01-31 PROCEDURE — 84540 ASSAY OF URINE/UREA-N: CPT | Mod: HCNC | Performed by: PHYSICIAN ASSISTANT

## 2022-01-31 PROCEDURE — 86850 RBC ANTIBODY SCREEN: CPT | Mod: HCNC | Performed by: PHYSICIAN ASSISTANT

## 2022-01-31 PROCEDURE — 84300 ASSAY OF URINE SODIUM: CPT | Mod: HCNC | Performed by: PHYSICIAN ASSISTANT

## 2022-01-31 PROCEDURE — 63600175 PHARM REV CODE 636 W HCPCS: Mod: HCNC | Performed by: STUDENT IN AN ORGANIZED HEALTH CARE EDUCATION/TRAINING PROGRAM

## 2022-01-31 PROCEDURE — 93005 ELECTROCARDIOGRAM TRACING: CPT | Mod: HCNC

## 2022-01-31 PROCEDURE — 96361 HYDRATE IV INFUSION ADD-ON: CPT | Mod: HCNC

## 2022-01-31 PROCEDURE — 93010 ELECTROCARDIOGRAM REPORT: CPT | Mod: HCNC,,, | Performed by: INTERNAL MEDICINE

## 2022-01-31 PROCEDURE — 81001 URINALYSIS AUTO W/SCOPE: CPT | Mod: HCNC | Performed by: PHYSICIAN ASSISTANT

## 2022-01-31 PROCEDURE — 84484 ASSAY OF TROPONIN QUANT: CPT | Mod: HCNC | Performed by: PHYSICIAN ASSISTANT

## 2022-01-31 PROCEDURE — 87070 CULTURE OTHR SPECIMN AEROBIC: CPT | Mod: HCNC | Performed by: STUDENT IN AN ORGANIZED HEALTH CARE EDUCATION/TRAINING PROGRAM

## 2022-01-31 PROCEDURE — 82570 ASSAY OF URINE CREATININE: CPT | Mod: HCNC | Performed by: PHYSICIAN ASSISTANT

## 2022-01-31 PROCEDURE — 85025 COMPLETE CBC W/AUTO DIFF WBC: CPT | Mod: HCNC | Performed by: PHYSICIAN ASSISTANT

## 2022-01-31 PROCEDURE — 99223 1ST HOSP IP/OBS HIGH 75: CPT | Mod: AI,HCNC,GC, | Performed by: STUDENT IN AN ORGANIZED HEALTH CARE EDUCATION/TRAINING PROGRAM

## 2022-01-31 PROCEDURE — 84100 ASSAY OF PHOSPHORUS: CPT | Mod: HCNC | Performed by: PHYSICIAN ASSISTANT

## 2022-01-31 PROCEDURE — 80053 COMPREHEN METABOLIC PANEL: CPT | Mod: HCNC | Performed by: PHYSICIAN ASSISTANT

## 2022-01-31 PROCEDURE — U0002 COVID-19 LAB TEST NON-CDC: HCPCS | Mod: HCNC | Performed by: PHYSICIAN ASSISTANT

## 2022-01-31 PROCEDURE — 83735 ASSAY OF MAGNESIUM: CPT | Mod: HCNC | Performed by: PHYSICIAN ASSISTANT

## 2022-01-31 PROCEDURE — 25000003 PHARM REV CODE 250: Mod: HCNC | Performed by: STUDENT IN AN ORGANIZED HEALTH CARE EDUCATION/TRAINING PROGRAM

## 2022-01-31 PROCEDURE — 94761 N-INVAS EAR/PLS OXIMETRY MLT: CPT | Mod: HCNC

## 2022-01-31 PROCEDURE — 83605 ASSAY OF LACTIC ACID: CPT | Mod: HCNC | Performed by: PHYSICIAN ASSISTANT

## 2022-01-31 PROCEDURE — 63600175 PHARM REV CODE 636 W HCPCS: Mod: HCNC | Performed by: PHYSICIAN ASSISTANT

## 2022-01-31 PROCEDURE — 93010 ELECTROCARDIOGRAM REPORT: CPT | Mod: HCNC,76,, | Performed by: INTERNAL MEDICINE

## 2022-01-31 PROCEDURE — 84145 PROCALCITONIN (PCT): CPT | Mod: HCNC | Performed by: EMERGENCY MEDICINE

## 2022-01-31 PROCEDURE — 99285 PR EMERGENCY DEPT VISIT,LEVEL V: ICD-10-PCS | Mod: HCNC,CS,, | Performed by: PHYSICIAN ASSISTANT

## 2022-01-31 PROCEDURE — P9021 RED BLOOD CELLS UNIT: HCPCS | Mod: HCNC | Performed by: PHYSICIAN ASSISTANT

## 2022-01-31 PROCEDURE — 85652 RBC SED RATE AUTOMATED: CPT | Mod: HCNC | Performed by: PHYSICIAN ASSISTANT

## 2022-01-31 PROCEDURE — 93010 RHYTHM STRIP: ICD-10-PCS | Mod: HCNC,,, | Performed by: INTERNAL MEDICINE

## 2022-01-31 PROCEDURE — 96375 TX/PRO/DX INJ NEW DRUG ADDON: CPT | Mod: HCNC

## 2022-01-31 PROCEDURE — G0378 HOSPITAL OBSERVATION PER HR: HCPCS | Mod: HCNC

## 2022-01-31 PROCEDURE — 25000242 PHARM REV CODE 250 ALT 637 W/ HCPCS: Mod: HCNC | Performed by: STUDENT IN AN ORGANIZED HEALTH CARE EDUCATION/TRAINING PROGRAM

## 2022-01-31 PROCEDURE — 99223 PR INITIAL HOSPITAL CARE,LEVL III: ICD-10-PCS | Mod: AI,HCNC,GC, | Performed by: STUDENT IN AN ORGANIZED HEALTH CARE EDUCATION/TRAINING PROGRAM

## 2022-01-31 PROCEDURE — 71045 X-RAY EXAM CHEST 1 VIEW: CPT | Mod: 26,HCNC,76, | Performed by: INTERNAL MEDICINE

## 2022-01-31 PROCEDURE — 87040 BLOOD CULTURE FOR BACTERIA: CPT | Mod: 59,HCNC | Performed by: PHYSICIAN ASSISTANT

## 2022-01-31 PROCEDURE — 71045 XR CHEST 1 VIEW: ICD-10-PCS | Mod: 26,HCNC,76, | Performed by: INTERNAL MEDICINE

## 2022-01-31 PROCEDURE — 80048 BASIC METABOLIC PNL TOTAL CA: CPT | Mod: HCNC | Performed by: STUDENT IN AN ORGANIZED HEALTH CARE EDUCATION/TRAINING PROGRAM

## 2022-01-31 PROCEDURE — 87086 URINE CULTURE/COLONY COUNT: CPT | Mod: HCNC | Performed by: PHYSICIAN ASSISTANT

## 2022-01-31 PROCEDURE — 86140 C-REACTIVE PROTEIN: CPT | Mod: HCNC | Performed by: PHYSICIAN ASSISTANT

## 2022-01-31 PROCEDURE — 25000003 PHARM REV CODE 250: Mod: HCNC | Performed by: PHYSICIAN ASSISTANT

## 2022-01-31 PROCEDURE — 94640 AIRWAY INHALATION TREATMENT: CPT | Mod: HCNC

## 2022-01-31 PROCEDURE — 99285 EMERGENCY DEPT VISIT HI MDM: CPT | Mod: HCNC,CS,, | Performed by: PHYSICIAN ASSISTANT

## 2022-01-31 PROCEDURE — 96365 THER/PROPH/DIAG IV INF INIT: CPT | Mod: HCNC

## 2022-01-31 PROCEDURE — 86920 COMPATIBILITY TEST SPIN: CPT | Mod: HCNC | Performed by: PHYSICIAN ASSISTANT

## 2022-01-31 PROCEDURE — 96366 THER/PROPH/DIAG IV INF ADDON: CPT | Mod: HCNC

## 2022-01-31 PROCEDURE — 27000221 HC OXYGEN, UP TO 24 HOURS: Mod: HCNC

## 2022-01-31 RX ORDER — HYDROCODONE BITARTRATE AND ACETAMINOPHEN 500; 5 MG/1; MG/1
TABLET ORAL
Status: DISCONTINUED | OUTPATIENT
Start: 2022-01-31 | End: 2022-02-01

## 2022-01-31 RX ORDER — ALBUTEROL SULFATE 2.5 MG/.5ML
2.5 SOLUTION RESPIRATORY (INHALATION) EVERY 4 HOURS PRN
Status: DISCONTINUED | OUTPATIENT
Start: 2022-01-31 | End: 2022-02-10 | Stop reason: HOSPADM

## 2022-01-31 RX ORDER — CEFEPIME HYDROCHLORIDE 1 G/50ML
2 INJECTION, SOLUTION INTRAVENOUS
Status: DISCONTINUED | OUTPATIENT
Start: 2022-01-31 | End: 2022-01-31

## 2022-01-31 RX ORDER — TALC
6 POWDER (GRAM) TOPICAL NIGHTLY PRN
Status: CANCELLED | OUTPATIENT
Start: 2022-01-31

## 2022-01-31 RX ORDER — INSULIN ASPART 100 [IU]/ML
1-10 INJECTION, SOLUTION INTRAVENOUS; SUBCUTANEOUS EVERY 6 HOURS PRN
Status: DISCONTINUED | OUTPATIENT
Start: 2022-01-31 | End: 2022-02-01

## 2022-01-31 RX ORDER — GLUCAGON 1 MG
1 KIT INJECTION
Status: DISCONTINUED | OUTPATIENT
Start: 2022-01-31 | End: 2022-01-31

## 2022-01-31 RX ORDER — ONDANSETRON 8 MG/1
8 TABLET, ORALLY DISINTEGRATING ORAL EVERY 8 HOURS PRN
Status: DISCONTINUED | OUTPATIENT
Start: 2022-01-31 | End: 2022-02-10 | Stop reason: HOSPADM

## 2022-01-31 RX ORDER — POLYETHYLENE GLYCOL 3350 17 G/17G
17 POWDER, FOR SOLUTION ORAL DAILY
Status: DISCONTINUED | OUTPATIENT
Start: 2022-02-01 | End: 2022-02-10 | Stop reason: HOSPADM

## 2022-01-31 RX ORDER — ALBUTEROL SULFATE 2.5 MG/.5ML
10 SOLUTION RESPIRATORY (INHALATION) ONCE
Status: COMPLETED | OUTPATIENT
Start: 2022-01-31 | End: 2022-01-31

## 2022-01-31 RX ORDER — TALC
6 POWDER (GRAM) TOPICAL NIGHTLY PRN
Status: DISCONTINUED | OUTPATIENT
Start: 2022-01-31 | End: 2022-02-10 | Stop reason: HOSPADM

## 2022-01-31 RX ORDER — MORPHINE SULFATE 2 MG/ML
2 INJECTION, SOLUTION INTRAMUSCULAR; INTRAVENOUS EVERY 4 HOURS PRN
Status: DISCONTINUED | OUTPATIENT
Start: 2022-01-31 | End: 2022-02-04

## 2022-01-31 RX ORDER — GLUCAGON 1 MG
1 KIT INJECTION
Status: DISCONTINUED | OUTPATIENT
Start: 2022-01-31 | End: 2022-02-01

## 2022-01-31 RX ORDER — SODIUM CHLORIDE 0.9 % (FLUSH) 0.9 %
10 SYRINGE (ML) INJECTION
Status: CANCELLED | OUTPATIENT
Start: 2022-01-31

## 2022-01-31 RX ORDER — IBUPROFEN 200 MG
24 TABLET ORAL
Status: DISCONTINUED | OUTPATIENT
Start: 2022-01-31 | End: 2022-02-01

## 2022-01-31 RX ORDER — MORPHINE SULFATE 4 MG/ML
4 INJECTION, SOLUTION INTRAMUSCULAR; INTRAVENOUS EVERY 6 HOURS PRN
Status: DISCONTINUED | OUTPATIENT
Start: 2022-01-31 | End: 2022-02-04

## 2022-01-31 RX ORDER — ACETAMINOPHEN 325 MG/1
650 TABLET ORAL EVERY 4 HOURS PRN
Status: DISCONTINUED | OUTPATIENT
Start: 2022-01-31 | End: 2022-02-10 | Stop reason: HOSPADM

## 2022-01-31 RX ORDER — ATORVASTATIN CALCIUM 20 MG/1
80 TABLET, FILM COATED ORAL NIGHTLY
Status: DISCONTINUED | OUTPATIENT
Start: 2022-01-31 | End: 2022-02-10 | Stop reason: HOSPADM

## 2022-01-31 RX ORDER — IBUPROFEN 200 MG
16 TABLET ORAL
Status: DISCONTINUED | OUTPATIENT
Start: 2022-01-31 | End: 2022-02-01

## 2022-01-31 RX ORDER — SODIUM CHLORIDE 0.9 % (FLUSH) 0.9 %
10 SYRINGE (ML) INJECTION EVERY 12 HOURS PRN
Status: DISCONTINUED | OUTPATIENT
Start: 2022-01-31 | End: 2022-02-10 | Stop reason: HOSPADM

## 2022-01-31 RX ORDER — NALOXONE HCL 0.4 MG/ML
0.02 VIAL (ML) INJECTION
Status: DISCONTINUED | OUTPATIENT
Start: 2022-01-31 | End: 2022-02-10 | Stop reason: HOSPADM

## 2022-01-31 RX ORDER — OXYCODONE AND ACETAMINOPHEN 5; 325 MG/1; MG/1
1 TABLET ORAL
Status: COMPLETED | OUTPATIENT
Start: 2022-01-31 | End: 2022-01-31

## 2022-01-31 RX ORDER — HYDROCODONE BITARTRATE AND ACETAMINOPHEN 5; 325 MG/1; MG/1
1 TABLET ORAL EVERY 6 HOURS PRN
Status: DISCONTINUED | OUTPATIENT
Start: 2022-01-31 | End: 2022-02-04

## 2022-01-31 RX ADMIN — ALBUTEROL SULFATE 10 MG: 2.5 SOLUTION RESPIRATORY (INHALATION) at 08:01

## 2022-01-31 RX ADMIN — OXYCODONE HYDROCHLORIDE AND ACETAMINOPHEN 1 TABLET: 5; 325 TABLET ORAL at 05:01

## 2022-01-31 RX ADMIN — SODIUM CHLORIDE, SODIUM LACTATE, POTASSIUM CHLORIDE, AND CALCIUM CHLORIDE 1000 ML: .6; .31; .03; .02 INJECTION, SOLUTION INTRAVENOUS at 04:01

## 2022-01-31 RX ADMIN — SODIUM ZIRCONIUM CYCLOSILICATE 10 G: 10 POWDER, FOR SUSPENSION ORAL at 09:01

## 2022-01-31 RX ADMIN — ATORVASTATIN CALCIUM 80 MG: 40 TABLET, FILM COATED ORAL at 09:01

## 2022-01-31 RX ADMIN — AZITHROMYCIN MONOHYDRATE 500 MG: 500 INJECTION, POWDER, LYOPHILIZED, FOR SOLUTION INTRAVENOUS at 09:01

## 2022-01-31 RX ADMIN — MORPHINE SULFATE 4 MG: 4 INJECTION INTRAVENOUS at 08:01

## 2022-01-31 NOTE — ED NOTES
Patient identifiers verified and correct for Suyapa Connelly  C/C: PT came from [facility] with reports of pt becoming more lethargic than normal. Pt is at baseline neuro. PT is at the rehab after Right BKA. Pt is on Vanco PICC line on the right upper arm  APPEARANCE: awake and alert in no acute distress.  SKIN: warm, dry. Amputated R extremity below the knee. Some drainage at site.   MUSCULOSKELETAL: Patient moving all extremities spontaneously, no obvious swelling or deformities noted.  RESPIRATORY: . Respirations unlabored.   CARDIAC:, 2+ distal pulses; no peripheral edema  ABDOMEN: soft, non-tender, and non-distended, Denies N/V  : voids spontaneously, denies difficulty  Neurologic: oriented X2, does not follow commands equal strength in all extremities; denies numbness/tingling. Denies dizziness

## 2022-01-31 NOTE — ED NOTES
"[facility] calls RN and states that pt was "very confused this morning and more lethargic than normal." Per rehab, pt is normally AOx3 and able to follow commands. Rehab states they were concerned for sepsis due to elevated WBC and ROSLYN. MD notified.   "

## 2022-01-31 NOTE — ED TRIAGE NOTES
PT came from [facility] with reports of pt becoming more lethargic than normal. Pt is at baseline neuro. PT is at the rehab after Right BKA. Pt is on Vanco PICC line on the right upper arm

## 2022-01-31 NOTE — Clinical Note
Is this patient a high probability for COVID-19?: No   Diagnosis: Altered mental status [780.97.ICD-9-CM]   Future Attending Provider: MELVIN CHERY [[id number]]   Is the patient being sent to ED Observation?: No   Admitting Provider:: MELVIN CHERY [[id number]]

## 2022-02-01 ENCOUNTER — HOSPITAL ENCOUNTER (OUTPATIENT)
Dept: RADIOLOGY | Facility: HOSPITAL | Age: 57
Discharge: HOME | DRG: 862 | End: 2022-02-01
Payer: MEDICARE

## 2022-02-01 LAB
ALBUMIN SERPL BCP-MCNC: 1.9 G/DL (ref 3.5–5.2)
ALP SERPL-CCNC: 111 U/L (ref 55–135)
ALT SERPL W/O P-5'-P-CCNC: 14 U/L (ref 10–44)
ANION GAP SERPL CALC-SCNC: 11 MMOL/L (ref 8–16)
AST SERPL-CCNC: 26 U/L (ref 10–40)
BASOPHILS # BLD AUTO: 0.1 K/UL (ref 0–0.2)
BASOPHILS NFR BLD: 0.7 % (ref 0–1.9)
BILIRUB SERPL-MCNC: 0.6 MG/DL (ref 0.1–1)
BUN SERPL-MCNC: 58 MG/DL (ref 6–20)
CALCIUM SERPL-MCNC: 9.4 MG/DL (ref 8.7–10.5)
CHLORIDE SERPL-SCNC: 100 MMOL/L (ref 95–110)
CO2 SERPL-SCNC: 28 MMOL/L (ref 23–29)
CREAT SERPL-MCNC: 1.8 MG/DL (ref 0.5–1.4)
DIFFERENTIAL METHOD: ABNORMAL
EOSINOPHIL # BLD AUTO: 0.2 K/UL (ref 0–0.5)
EOSINOPHIL NFR BLD: 1.6 % (ref 0–8)
ERYTHROCYTE [DISTWIDTH] IN BLOOD BY AUTOMATED COUNT: 14.1 % (ref 11.5–14.5)
EST. GFR  (AFRICAN AMERICAN): 36 ML/MIN/1.73 M^2
EST. GFR  (NON AFRICAN AMERICAN): 31.2 ML/MIN/1.73 M^2
GLUCOSE SERPL-MCNC: 93 MG/DL (ref 70–110)
HCT VFR BLD AUTO: 23.3 % (ref 37–48.5)
HGB BLD-MCNC: 7.1 G/DL (ref 12–16)
IMM GRANULOCYTES # BLD AUTO: 0.05 K/UL (ref 0–0.04)
IMM GRANULOCYTES NFR BLD AUTO: 0.3 % (ref 0–0.5)
LYMPHOCYTES # BLD AUTO: 1.9 K/UL (ref 1–4.8)
LYMPHOCYTES NFR BLD: 12.9 % (ref 18–48)
MAGNESIUM SERPL-MCNC: 2.7 MG/DL (ref 1.6–2.6)
MCH RBC QN AUTO: 30 PG (ref 27–31)
MCHC RBC AUTO-ENTMCNC: 30.5 G/DL (ref 32–36)
MCV RBC AUTO: 98 FL (ref 82–98)
MONOCYTES # BLD AUTO: 1.1 K/UL (ref 0.3–1)
MONOCYTES NFR BLD: 7.5 % (ref 4–15)
NEUTROPHILS # BLD AUTO: 11.6 K/UL (ref 1.8–7.7)
NEUTROPHILS NFR BLD: 77 % (ref 38–73)
NRBC BLD-RTO: 0 /100 WBC
PHOSPHATE SERPL-MCNC: 6.2 MG/DL (ref 2.7–4.5)
PLATELET # BLD AUTO: 382 K/UL (ref 150–450)
PMV BLD AUTO: 11 FL (ref 9.2–12.9)
POCT GLUCOSE: 103 MG/DL (ref 70–110)
POCT GLUCOSE: 115 MG/DL (ref 70–110)
POCT GLUCOSE: 162 MG/DL (ref 70–110)
POCT GLUCOSE: 164 MG/DL (ref 70–110)
POCT GLUCOSE: 175 MG/DL (ref 70–110)
POTASSIUM SERPL-SCNC: 4.7 MMOL/L (ref 3.5–5.1)
POTASSIUM SERPL-SCNC: 5.1 MMOL/L (ref 3.5–5.1)
PROT SERPL-MCNC: 7.4 G/DL (ref 6–8.4)
RBC # BLD AUTO: 2.37 M/UL (ref 4–5.4)
SODIUM SERPL-SCNC: 139 MMOL/L (ref 136–145)
VANCOMYCIN SERPL-MCNC: 11.3 UG/ML
WBC # BLD AUTO: 15 K/UL (ref 3.9–12.7)

## 2022-02-01 PROCEDURE — 76775 US EXAM ABDO BACK WALL LIM: CPT | Mod: 26,HCNC,, | Performed by: RADIOLOGY

## 2022-02-01 PROCEDURE — 92610 EVALUATE SWALLOWING FUNCTION: CPT | Mod: HCNC

## 2022-02-01 PROCEDURE — 99285 EMERGENCY DEPT VISIT HI MDM: CPT | Mod: 25,HCNC

## 2022-02-01 PROCEDURE — 84132 ASSAY OF SERUM POTASSIUM: CPT | Mod: HCNC | Performed by: STUDENT IN AN ORGANIZED HEALTH CARE EDUCATION/TRAINING PROGRAM

## 2022-02-01 PROCEDURE — 84100 ASSAY OF PHOSPHORUS: CPT | Mod: HCNC | Performed by: STUDENT IN AN ORGANIZED HEALTH CARE EDUCATION/TRAINING PROGRAM

## 2022-02-01 PROCEDURE — 99221 1ST HOSP IP/OBS SF/LOW 40: CPT | Mod: HCNC,,, | Performed by: SURGERY

## 2022-02-01 PROCEDURE — 11000001 HC ACUTE MED/SURG PRIVATE ROOM: Mod: HCNC

## 2022-02-01 PROCEDURE — 99499 NO LOS: ICD-10-PCS | Mod: HCNC,,, | Performed by: PHYSICIAN ASSISTANT

## 2022-02-01 PROCEDURE — 82962 GLUCOSE BLOOD TEST: CPT | Mod: HCNC

## 2022-02-01 PROCEDURE — 25000003 PHARM REV CODE 250: Mod: HCNC | Performed by: STUDENT IN AN ORGANIZED HEALTH CARE EDUCATION/TRAINING PROGRAM

## 2022-02-01 PROCEDURE — 63600175 PHARM REV CODE 636 W HCPCS: Mod: HCNC | Performed by: STUDENT IN AN ORGANIZED HEALTH CARE EDUCATION/TRAINING PROGRAM

## 2022-02-01 PROCEDURE — 80202 ASSAY OF VANCOMYCIN: CPT | Mod: HCNC | Performed by: STUDENT IN AN ORGANIZED HEALTH CARE EDUCATION/TRAINING PROGRAM

## 2022-02-01 PROCEDURE — 99499 UNLISTED E&M SERVICE: CPT | Mod: HCNC,,, | Performed by: PHYSICIAN ASSISTANT

## 2022-02-01 PROCEDURE — 96367 TX/PROPH/DG ADDL SEQ IV INF: CPT | Mod: HCNC

## 2022-02-01 PROCEDURE — 99233 PR SUBSEQUENT HOSPITAL CARE,LEVL III: ICD-10-PCS | Mod: HCNC,GC,, | Performed by: STUDENT IN AN ORGANIZED HEALTH CARE EDUCATION/TRAINING PROGRAM

## 2022-02-01 PROCEDURE — 99223 PR INITIAL HOSPITAL CARE,LEVL III: ICD-10-PCS | Mod: HCNC,,, | Performed by: INTERNAL MEDICINE

## 2022-02-01 PROCEDURE — 25000003 PHARM REV CODE 250: Mod: HCNC

## 2022-02-01 PROCEDURE — 99233 SBSQ HOSP IP/OBS HIGH 50: CPT | Mod: HCNC,GC,, | Performed by: STUDENT IN AN ORGANIZED HEALTH CARE EDUCATION/TRAINING PROGRAM

## 2022-02-01 PROCEDURE — 96366 THER/PROPH/DIAG IV INF ADDON: CPT | Mod: HCNC

## 2022-02-01 PROCEDURE — 85025 COMPLETE CBC W/AUTO DIFF WBC: CPT | Mod: HCNC | Performed by: STUDENT IN AN ORGANIZED HEALTH CARE EDUCATION/TRAINING PROGRAM

## 2022-02-01 PROCEDURE — 80053 COMPREHEN METABOLIC PANEL: CPT | Mod: HCNC | Performed by: STUDENT IN AN ORGANIZED HEALTH CARE EDUCATION/TRAINING PROGRAM

## 2022-02-01 PROCEDURE — 96372 THER/PROPH/DIAG INJ SC/IM: CPT | Mod: HCNC

## 2022-02-01 PROCEDURE — 63600175 PHARM REV CODE 636 W HCPCS: Mod: HCNC

## 2022-02-01 PROCEDURE — 83735 ASSAY OF MAGNESIUM: CPT | Mod: HCNC | Performed by: STUDENT IN AN ORGANIZED HEALTH CARE EDUCATION/TRAINING PROGRAM

## 2022-02-01 PROCEDURE — C9399 UNCLASSIFIED DRUGS OR BIOLOG: HCPCS | Mod: HCNC

## 2022-02-01 PROCEDURE — 99223 1ST HOSP IP/OBS HIGH 75: CPT | Mod: HCNC,,, | Performed by: INTERNAL MEDICINE

## 2022-02-01 PROCEDURE — 76775 US ABDOMEN LIMITED_KIDNEY: ICD-10-PCS | Mod: 26,HCNC,, | Performed by: RADIOLOGY

## 2022-02-01 PROCEDURE — 99221 PR INITIAL HOSPITAL CARE,LEVL I: ICD-10-PCS | Mod: HCNC,,, | Performed by: SURGERY

## 2022-02-01 RX ORDER — INSULIN ASPART 100 [IU]/ML
1-10 INJECTION, SOLUTION INTRAVENOUS; SUBCUTANEOUS
Status: DISCONTINUED | OUTPATIENT
Start: 2022-02-01 | End: 2022-02-10 | Stop reason: HOSPADM

## 2022-02-01 RX ORDER — AZITHROMYCIN 250 MG/1
250 TABLET, FILM COATED ORAL DAILY
Status: DISCONTINUED | OUTPATIENT
Start: 2022-02-02 | End: 2022-02-02

## 2022-02-01 RX ORDER — VANCOMYCIN HCL IN 5 % DEXTROSE 1G/250ML
15 PLASTIC BAG, INJECTION (ML) INTRAVENOUS ONCE
Status: COMPLETED | OUTPATIENT
Start: 2022-02-01 | End: 2022-02-01

## 2022-02-01 RX ORDER — IBUPROFEN 200 MG
16 TABLET ORAL
Status: DISCONTINUED | OUTPATIENT
Start: 2022-02-01 | End: 2022-02-10 | Stop reason: HOSPADM

## 2022-02-01 RX ORDER — IBUPROFEN 200 MG
24 TABLET ORAL
Status: DISCONTINUED | OUTPATIENT
Start: 2022-02-01 | End: 2022-02-10 | Stop reason: HOSPADM

## 2022-02-01 RX ORDER — HYDRALAZINE HYDROCHLORIDE 25 MG/1
25 TABLET, FILM COATED ORAL EVERY 8 HOURS PRN
Status: DISCONTINUED | OUTPATIENT
Start: 2022-02-01 | End: 2022-02-10 | Stop reason: HOSPADM

## 2022-02-01 RX ORDER — GLUCAGON 1 MG
1 KIT INJECTION
Status: DISCONTINUED | OUTPATIENT
Start: 2022-02-01 | End: 2022-02-10 | Stop reason: HOSPADM

## 2022-02-01 RX ADMIN — ATORVASTATIN CALCIUM 80 MG: 40 TABLET, FILM COATED ORAL at 08:02

## 2022-02-01 RX ADMIN — Medication 6 MG: at 10:02

## 2022-02-01 RX ADMIN — VANCOMYCIN HYDROCHLORIDE 1000 MG: 1 INJECTION, POWDER, LYOPHILIZED, FOR SOLUTION INTRAVENOUS at 02:02

## 2022-02-01 RX ADMIN — MEROPENEM 2 G: 1 INJECTION INTRAVENOUS at 12:02

## 2022-02-01 RX ADMIN — AZITHROMYCIN MONOHYDRATE 250 MG: 500 INJECTION, POWDER, LYOPHILIZED, FOR SOLUTION INTRAVENOUS at 10:02

## 2022-02-01 RX ADMIN — INSULIN ASPART 2 UNITS: 100 INJECTION, SOLUTION INTRAVENOUS; SUBCUTANEOUS at 12:02

## 2022-02-01 RX ADMIN — SODIUM CHLORIDE, SODIUM LACTATE, POTASSIUM CHLORIDE, AND CALCIUM CHLORIDE 500 ML: .6; .31; .03; .02 INJECTION, SOLUTION INTRAVENOUS at 01:02

## 2022-02-01 RX ADMIN — INSULIN DETEMIR 10 UNITS: 100 INJECTION, SOLUTION SUBCUTANEOUS at 10:02

## 2022-02-01 RX ADMIN — POLYETHYLENE GLYCOL 3350 17 G: 17 POWDER, FOR SOLUTION ORAL at 10:02

## 2022-02-01 RX ADMIN — MORPHINE SULFATE 2 MG: 2 INJECTION, SOLUTION INTRAMUSCULAR; INTRAVENOUS at 08:02

## 2022-02-01 NOTE — HPI
57 y/o female with DMII, HLD, CKD, PAD, L AKA 5/2021 presents to ED from [facility] with confusion and bleeding and drainage from her surgical stump site. She was admitted at [facility] 12/2021 for gangrene of her right foot s/p R BKA with Dr. Rojas on 12/28. She was discharged to rehab on 1/10. She was readmitted to [facility] for infection of her R BKA stump site and underwent I&D and revision with Dr. Rojas on 1/24. No surgical cultures obtained. Prior wound cultures +ESBL Klebsiella and Pseudomonas. She was seen by [facility] and was discharged on two week course of merpoenem, EOC 2/8/22.     Pt remained afebrile,stable, no leukocytosis. Pt is currently on vanc/meropenem/azithromycin. MRI findings negative for osteomyelitis. Ill defined fluid collection near stump site. Sacral wound appears stable. No cough on examination. Pt reports twitching and weakness.

## 2022-02-01 NOTE — PLAN OF CARE
Problem: Adult Inpatient Plan of Care  Goal: Plan of Care Review  Outcome: Ongoing, Progressing  Goal: Patient-Specific Goal (Individualized)  Outcome: Ongoing, Progressing  Goal: Absence of Hospital-Acquired Illness or Injury  Outcome: Ongoing, Progressing  Goal: Optimal Comfort and Wellbeing  Outcome: Ongoing, Progressing  Goal: Readiness for Transition of Care  Outcome: Ongoing, Progressing     AAOx2. VS stable. No falls or injuries. Bed low, wheels locked, side rails up x2, call light within reach. Pt reports no current needs. Will continue to monitor.

## 2022-02-01 NOTE — PT/OT/SLP EVAL
Speech Language Pathology Evaluation/Discharge  Bedside Swallow    Patient Name:  Suyapa Connelly   MRN:  [id number]  Admitting Diagnosis: Severe sepsis    Recommendations:                 General Recommendations:  Follow-up not indicated  Diet recommendations:  Regular, Thin   Aspiration Precautions: 1 bite/sip at a time, Alternating bites/sips, Feed only when awake/alert, HOB to 90 degrees, Meds whole 1 at a time, Monitor for s/s of aspiration, Small bites/sips and Standard aspiration precautions   General Precautions: Standard, aspiration,fall  Communication strategies:  go to room if call light pushed    History:     Past Medical History:   Diagnosis Date    Anxiety     Chronic pain syndrome     CKD (chronic kidney disease), stage III     Depression     Diabetes mellitus     type 2    Diabetes mellitus, type 2     GERD (gastroesophageal reflux disease)     Hyperemesis 3/23/2021    Hyperlipemia     Hypertension     Hypokalemia 3/23/2021    Myocardial infarction 2010    minor-caused by stress per pt.    Osteomyelitis     Osteomyelitis of left foot 4/30/2021    PVD (peripheral vascular disease)     Vaginal delivery     x1       Past Surgical History:   Procedure Laterality Date    ABOVE-KNEE AMPUTATION Left 5/18/2021    Procedure: AMPUTATION, ABOVE KNEE;  Surgeon: Teddy Huber MD;  Location: [address];  Service: Vascular;  Laterality: Left;    Angiogram - Right Extremity Right 7/9/15    angiogram-left leg  10/6/15    ANGIOGRAPHY OF LOWER EXTREMITY Left 4/29/2021    Procedure: ANGIOGRAM, LOWER EXTREMITY;  Surgeon: Teddy Huber MD;  Location: [address];  Service: Vascular;  Laterality: Left;    BELOW KNEE AMPUTATION OF LOWER EXTREMITY Right 12/28/2021    Procedure: AMPUTATION, BELOW KNEE;  Surgeon: Kaitlyn Rojas MD;  Location: [facility];  Service: General;  Laterality: Right;    CATHETERIZATION OF BOTH LEFT AND RIGHT HEART N/A 12/18/2019    Procedure:  CATHETERIZATION, HEART, BOTH LEFT AND RIGHT;  Surgeon: Que Fernando III, MD;  Location: [facility] CATH LAB;  Service: Cardiology;  Laterality: N/A;    CORONARY ANGIOGRAPHY N/A 12/18/2019    Procedure: ANGIOGRAM, CORONARY ARTERY;  Surgeon: Que Fernando III, MD;  Location: [facility] CATH LAB;  Service: Cardiology;  Laterality: N/A;    CORONARY ANGIOGRAPHY INCLUDING BYPASS GRAFTS WITH CATHETERIZATION OF LEFT HEART N/A 7/28/2020    Procedure: ANGIOGRAM, CORONARY, INCLUDING BYPASS GRAFT, WITH LEFT HEART CATHETERIZATION, 9 am;  Surgeon: Rachel Easley MD;  Location: [facility] CATH LAB;  Service: Cardiology;  Laterality: N/A;    CORONARY ARTERY BYPASS GRAFT (CABG) N/A 1/14/2020    Procedure: CORONARY ARTERY BYPASS GRAFT (CABG) x 1     Off Pump;  Surgeon: Huang Altamirano MD;  Location: [facility] OR [address] [place] [address];  Service: Cardiovascular;  Laterality: N/A;    CREATION OF FEMORAL-TIBIAL ARTERY BYPASS Left 4/29/2021    Procedure: CREATION, BYPASS, ARTERIAL, FEMORAL TO ANTERIOR TIBIAL;  Surgeon: Teddy Huber MD;  Location: [facility] OR [address] [place] [address];  Service: Vascular;  Laterality: Left;    CREATION OF FEMOROPOPLITEAL ARTERIAL BYPASS USING GRAFT Left 8/18/2020    Procedure: CREATION, BYPASS, ARTERIAL, FEMORAL TO POPLITEAL, USING GRAFT, LEFT LOWER EXTREMITY;  Surgeon: Teddy Huber MD;  Location: [facility];  Service: Vascular;  Laterality: Left;  REQUEST 7:15 A.M. START----COVID NEGATIVE ON 8/17  1ST CASE JASWANT PER LEANA ON 8/7/2020 @ 942AM-LO  RN PREOP 8/12/2020   T/S-----CLEARED BY CARDS-------PENDING INSURANCE    DEBRIDEMENT OF FOOT Left 9/8/2020    Procedure: DEBRIDEMENT, FOOT;  Surgeon: Rosio Mayes DPM;  Location: [facility] OR;  Service: Podiatry;  Laterality: Left;  request neoxx .   RN Pre Op 9-4-2020, Covid negative on 9/5/20. C A    DEBRIDEMENT OF FOOT  3/4/2021    Procedure: DEBRIDEMENT, FOOT;  Surgeon: Teddy Huber MD;  Location: [facility] OR;  Service: Vascular;;    DEBRIDEMENT OF FOOT Left 3/9/2021     Procedure: DEBRIDEMENT, FOOT, bone biopsy;  Surgeon: Rosio Mayes DPM;  Location: [facility] OR;  Service: Podiatry;  Laterality: Left;  Request neoxx---COVID IN AM  REQUESTING NOON START  RN Phone Pre op.On Blood thinners Plavix and Eliquis.  Covid am of surgery. C A    DEBRIDEMENT OF FOOT Left 5/4/2021    Procedure: DEBRIDEMENT, FOOT;  Surgeon: Farooq Morley DPM;  Location: [facility] OR 2ND FLR;  Service: Podiatry;  Laterality: Left;    INSERTION OF TUNNELED CENTRAL VENOUS HEMODIALYSIS CATHETER N/A 1/27/2020    Procedure: Insertion, Catheter, Central Venous, Hemodialysis;  Surgeon: ESTEBAN Gomez III, MD;  Location: [facility] CATH LAB;  Service: Peripheral Vascular;  Laterality: N/A;    PERCUTANEOUS TRANSLUMINAL ANGIOPLASTY N/A 3/4/2021    Procedure: PTA (ANGIOPLASTY, PERCUTANEOUS, TRANSLUMINAL);  Surgeon: Teddy Huber MD;  Location: [facility] OR;  Service: Vascular;  Laterality: N/A;    REMOVAL OF ARTERIOVENOUS GRAFT Left 5/27/2021    Procedure: REMOVAL, GRAFT, LEFT LOWER EXTREMITY, WOUND EXPLORATION;  Surgeon: Teddy Huber MD;  Location: [facility] OR [facility] FLR;  Service: Vascular;  Laterality: Left;    REMOVAL OF NAIL OF DIGIT Left 3/9/2021    Procedure: REMOVAL, NAIL, DIGIT;  Surgeon: Rosio Mayes DPM;  Location: [facility] OR;  Service: Podiatry;  Laterality: Left;    THROMBECTOMY Left 3/4/2021    Procedure: THROMBECTOMY, LEFT LOWER EXTREMITY BYPASS GRAFT, ANGIOGRAM, POSSIBLE INTERVENTION, POSSIBLE LEFT LOWER EXTREMITY BYPASS;  Surgeon: Teddy Huber MD;  Location: [facility] OR;  Service: Vascular;  Laterality: Left;    THROMBECTOMY Left 4/29/2021    Procedure: GRAFT THROMBECTOMY, LEFT LOWER EXTREMITY;  Surgeon: Teddy Huber MD;  Location: [facility] OR 2ND FLR;  Service: Vascular;  Laterality: Left;  14.5 min  1179.85 mGy  341.01 Gycm2  240 ml dye    THROMBECTOMY  10/22/2021    Procedure: THROMBECTOMY;  Surgeon: Saad Arenas MD;  Location: [facility] CATH LAB/EP;  Service: Cardiology;;     HPI: Suyapa  "Godwin is a 57 y/o female with PMHx CKD3, DMII, GERD, HLD, HTN, osteomyelitis, PVD, depression, and anxiety presents to ED with  for concerns of increased confusion from her rehab facility.  at bedside is concern for progressively worsening appearance of RLE wound, along with continued drainage, and worsening mental status of his wife which has progressed through this week. Patient emotionally labile and responds yes or no to most questions. She reports increased pain in her R leg with nausea over the past week. She also reports constipation without abdominal pain. She denies dysuria. Patient denies fevers, chills, vomiting, chest pain, cough, and shortness of breath.      Patient underwent right BKA by Dr. Rojas on 12/28 then discharged to rehab facility on 1/10. Since then she has recurrent hospital visits for the wound, requiring revisions and debridements; the last resulted in a pseudomonas positive wound culture resistant to cefepime 1/20. [facility] RN stated that patient was "very confused this morning and more lethargic than normal." Per rehab, pt is normally AOx3 and able to follow commands. Rehab states they were concerned for sepsis due to elevated WBC and ROSLYN. Per , her last known normal was 1 weeks ago.     She was admitted to hospital medicine for severe sepsis.        Prior Intubation HX:  None during this admission    Modified Barium Swallow: none on file    Chest X-Rays: 1/31/22:   FINDINGS:  Surgical changes are again seen within the chest.  The cardiac size is stable.  Lung fields demonstrate increased interstitial markings similar to prior exams.  There is left basilar atelectasis.  There are additional opacities at each lung base concerning for developing consolidation.    Prior diet: currently NPO; previous diet regular/thins    Subjective     "I can't get it." Pt displayed some confusion when she thought the wire from the pulse ox on her finger was the tubing from " her nasal cannula.      Pain/Comfort:  · Pain Rating 1: 0/10    Respiratory Status: Nasal cannula, flow 2 L/min    Objective:     Oral Musculature Evaluation  · Oral Musculature: WFL  · Dentition:  (missing molars)  · Secretion Management: adequate  · Mucosal Quality: dry  · Mandibular Strength and Mobility: WFL  · Oral Labial Strength and Mobility: WFL  · Lingual Strength and Mobility: WFL  · Buccal Strength and Mobility: WFL  · Volitional Cough: strong  · Volitional Swallow: elicited  · Voice Prior to PO Intake: dry, clear    Bedside Swallow Eval:   Consistencies Assessed:  · Thin liquids tsp x 1, cup sip x 1, straw sips x 3  · Solids 1/4 cracker x 1     Oral Phase:   · WFL, though mild oral residue 2/2 dry mucosa    Pharyngeal Phase:   · no overt clinical signs/symptoms of aspiration  · no overt clinical signs/symptoms of pharyngeal dysphagia    Compensatory Strategies  · liquid wash to clear mild oral residue 2/2 dry mucosa    Treatment: Education was provided to pt regarding role of SLP, purpose of swallowing assessment, ruling out aspiration, diet recommendations, standard aspiration precautions, and no need for further SLP services at this time.  Pt demonstrated understanding of education provided, but will benefit from continued reinforcement 2/2 mild confusion.    Assessment:     Suyapa Connelly is a 56 y.o. female.  She presents with functional oral and pharyngeal swallowing abilities.  Pt appears safe to resume regular consistency diet and thin liquids.  Standard aspiration precautions should be followed.  If mental status should become further altered and concerns for increased risks of aspiration arise, please re-consult SLP services.     Goals:   Multidisciplinary Problems     SLP Goals     Not on file                Plan:     · Patient to be seen:      · Plan of Care expires:     · Plan of Care reviewed with:  patient   · SLP Follow-Up:  No       Discharge recommendations:   (no further skilled SLP  services warranted at this time)     Time Tracking:     SLP Treatment Date:   02/01/22  Speech Start Time:  0845  Speech Stop Time:  0854     Speech Total Time (min):  9 min    Billable Minutes: Eval Swallow and Oral Function 9    02/01/2022

## 2022-02-01 NOTE — ASSESSMENT & PLAN NOTE
Last A1C 12/21 was 9.6    Plan  - patient is NPO  - MDSSI q6 PRN with POCT glu q6  - detemir 10 QD  - increase basal and add bolus regimen per requirements

## 2022-02-01 NOTE — ED NOTES
VS's stable , offers  No c/o's at this time , side rails up x2, call bell in reach. Bed in low position and brake engaged.IV sites asymptomatic. Skin w/d, respirations even and un;labored. Will continue to monitoir On portable tele box.

## 2022-02-01 NOTE — ASSESSMENT & PLAN NOTE
HPatient with acute kidney injury likely d/t IVVD/Dehydration Which is currently worsening. Labs reviewed- Renal function/electrolytes with Estimated Creatinine Clearance: 27.2 mL/min (A) (based on SCr of 2.1 mg/dL (H)). according to latest data. Monitor urine output and serial BMP and adjust therapy as needed. Avoid nephrotoxins and renally dose meds for GFR listed above.     Plan  - strict I/Os  - renal lytes pending  - renally dose meds  - avoid nephrotoxic agents

## 2022-02-01 NOTE — MED STUDENT ASSESSMENT & PLAN
"Severe sepsis   Elevated WBC, AMS, recent hx of RLE wound infxn (1/11)  - continue vancomycin, meropenem & azithromycin  - f/u wound, resp & urine cx  - ID consulted  - Gen surg consulted    Altered mental state   Potentially 2/2 to sepsis; pt more alert after administration of abx  Ddx overdose of pregabalin 2/2 to ROSLYN (where clinical improvement could be attributed to holding pregabalin)  CT head shows "no acute intracranial process"  - delirium precautions    ROSLYN (acute kidney injury)   Increased BUN & Cr, likely 2/2 to dehydration  - strict I/Os  - serial BMPs  - avoid nephrotoxins  - renally dose all medications    Myoclonus  Likely 2/2 to increased serum levels of pregabalin due to ROSLYN  - continue holding pregabalin    S/P BKA, right   Surgery 12/28/21  Complicated by wound infection  - PRN morphine 4mg for pain  - wound care consulted  - gen surg consulted    Acute blood loss anemia   S/p tfx 1u PRBC (Hb = 6.6 on admission)  - transfuse goal >7 Hb    Type 2 diabetes mellitus with diabetic nephropathy, with long-term current use of insulin   - diabetic diet  - insulin detemir   - SSI with POCT glucose TIDWM & QHS    CAROLIN (generalized anxiety disorder)   Hold medication during acute episode of AMS  - consider restarting home medication as MS improves    Mixed hyperlipidemia   - continue home medication (atorvastatin)    Moderate malnutrition  Likely 2/2 to chronic illness  - consult nutrition    Impaired functional mobility and endurance   immobility 2/2 to s/p right BKA commplicated by sacral wound  - PT/OT consulted    Decubitus ulcer of sacral region, unstageable   immobility 2/2 to s/p right BKA commplicated by sacral wound  - wound care consulted        "

## 2022-02-01 NOTE — H&P
"[facility]  History & Physical    Patient Name: Suyapa Connelly  MRN: [id number]  Patient Class: OP- Observation  Admission Date: 1/31/2022  Attending Physician: Jacinto Eugene MD   Primary Care Provider: Magen Christenesn MD         Patient information was obtained from spouse/SO, EMS personnel, nursing home and ER records.     Subjective:     Principal Problem:Infected wound    Chief Complaint:   Chief Complaint   Patient presents with    Fatigue     PT came from [facility] with reports of pt becoming more lethargic than normal. Pt is at baseline neuro. PT is at the rehab after Right BKA. Pt is on Vanco PICC line on the right upper arm.         HPI: Suyapa Connelly is a 57 y/o female with PMHx CKD3, DMII, GERD, HLD, HTN, osteomyelitis, PVD, depression, and anxiety presents to ED with  for concerns of increased confusion from her rehab facility.  at bedside is concern for progressively worsening appearance of RLE wound, along with continued drainage, and worsening mental status of his wife which has progressed through this week. Patient emotionally labile and responds yes or no to most questions. She reports increased pain in her R leg with nausea over the past week. She also reports constipation without abdominal pain. She denies dysuria. Patient denies fevers, chills, vomiting, chest pain, cough, and shortness of breath.     Patient underwent right BKA by Dr. Rojas on 12/28 then discharged to rehab facility on 1/10. Since then she has recurrent hospital visits for the wound, requiring revisions and debridements; the last resulted in a pseudomonas positive wound culture resistant to cefepime 1/20. [facility] RN stated that patient was "very confused this morning and more lethargic than normal." Per rehab, pt is normally AOx3 and able to follow commands. Rehab states they were concerned for sepsis due to elevated WBC and ROSLYN. Per , her last known " normal was 1 weeks ago.    She was admitted to hospital medicine for severe sepsis.       Past Medical History:   Diagnosis Date    Anxiety     Chronic pain syndrome     CKD (chronic kidney disease), stage III     Depression     Diabetes mellitus     type 2    Diabetes mellitus, type 2     GERD (gastroesophageal reflux disease)     Hyperemesis 3/23/2021    Hyperlipemia     Hypertension     Hypokalemia 3/23/2021    Myocardial infarction 2010    minor-caused by stress per pt.    Osteomyelitis     Osteomyelitis of left foot 4/30/2021    PVD (peripheral vascular disease)     Vaginal delivery     x1       Past Surgical History:   Procedure Laterality Date    ABOVE-KNEE AMPUTATION Left 5/18/2021    Procedure: AMPUTATION, ABOVE KNEE;  Surgeon: Teddy Huber MD;  Location: [facility] OR [address] [place] [address];  Service: Vascular;  Laterality: Left;    Angiogram - Right Extremity Right 7/9/15    angiogram-left leg  10/6/15    ANGIOGRAPHY OF LOWER EXTREMITY Left 4/29/2021    Procedure: ANGIOGRAM, LOWER EXTREMITY;  Surgeon: Teddy Huber MD;  Location: [facility] OR [address] [place] [address];  Service: Vascular;  Laterality: Left;    BELOW KNEE AMPUTATION OF LOWER EXTREMITY Right 12/28/2021    Procedure: AMPUTATION, BELOW KNEE;  Surgeon: Kaitlyn Rojas MD;  Location: [facility] OR;  Service: General;  Laterality: Right;    CATHETERIZATION OF BOTH LEFT AND RIGHT HEART N/A 12/18/2019    Procedure: CATHETERIZATION, HEART, BOTH LEFT AND RIGHT;  Surgeon: Que Fernando III, MD;  Location: [facility] CATH LAB;  Service: Cardiology;  Laterality: N/A;    CORONARY ANGIOGRAPHY N/A 12/18/2019    Procedure: ANGIOGRAM, CORONARY ARTERY;  Surgeon: Que Fernando III, MD;  Location: [facility] CATH LAB;  Service: Cardiology;  Laterality: N/A;    CORONARY ANGIOGRAPHY INCLUDING BYPASS GRAFTS WITH CATHETERIZATION OF LEFT HEART N/A 7/28/2020    Procedure: ANGIOGRAM, CORONARY, INCLUDING BYPASS GRAFT, WITH LEFT HEART CATHETERIZATION, 9 am;  Surgeon: Rachel VELIZ  MD Tracee;  Location: [facility] CATH LAB;  Service: Cardiology;  Laterality: N/A;    CORONARY ARTERY BYPASS GRAFT (CABG) N/A 1/14/2020    Procedure: CORONARY ARTERY BYPASS GRAFT (CABG) x 1     Off Pump;  Surgeon: Huang Altamirano MD;  Location: [facility] OR [address] [place] [address];  Service: Cardiovascular;  Laterality: N/A;    CREATION OF FEMORAL-TIBIAL ARTERY BYPASS Left 4/29/2021    Procedure: CREATION, BYPASS, ARTERIAL, FEMORAL TO ANTERIOR TIBIAL;  Surgeon: Teddy Huber MD;  [facility];  Service: Vascular;  Laterality: Left;    CREATION OF FEMOROPOPLITEAL ARTERIAL BYPASS USING GRAFT Left 8/18/2020    Procedure: CREATION, BYPASS, ARTERIAL, FEMORAL TO POPLITEAL, USING GRAFT, LEFT LOWER EXTREMITY;  Surgeon: Teddy Huber MD;  Location: [facility] OR;  Service: Vascular;  Laterality: Left;  REQUEST 7:15 A.M. START----COVID NEGATIVE ON 8/17 1ST CASE JASWANT PER LEANA ON 8/7/2020 @ 942AM-  RN PREOP 8/12/2020   T/S-----CLEARED BY CARDS-------PENDING INSURANCE    DEBRIDEMENT OF FOOT Left 9/8/2020    Procedure: DEBRIDEMENT, FOOT;  Surgeon: Rosio Mayes DPM;  Location: [facility] OR;  Service: Podiatry;  Laterality: Left;  request neoxx .   RN Pre Op 9-4-2020, Covid negative on 9/5/20. C A    DEBRIDEMENT OF FOOT  3/4/2021    Procedure: DEBRIDEMENT, FOOT;  Surgeon: Teddy Huber MD;  Location: [facility] OR;  Service: Vascular;;    DEBRIDEMENT OF FOOT Left 3/9/2021    Procedure: DEBRIDEMENT, FOOT, bone biopsy;  Surgeon: Rosio Mayes DPM;  Location: [facility] OR;  Service: Podiatry;  Laterality: Left;  Request neoxx---COVID IN AM  REQUESTING NOON START  RN Phone Pre op.On Blood thinners Plavix and Eliquis.  Covid am of surgery. C A    DEBRIDEMENT OF FOOT Left 5/4/2021    Procedure: DEBRIDEMENT, FOOT;  Surgeon: Farooq Morley DPM;  [facility];  Service: Podiatry;  Laterality: Left;    INSERTION OF TUNNELED CENTRAL VENOUS HEMODIALYSIS CATHETER N/A 1/27/2020    Procedure: Insertion, Catheter,  Central Venous, Hemodialysis;  Surgeon: ESTEBAN Gomez III, MD;  Location: [facility] CATH LAB;  Service: Peripheral Vascular;  Laterality: N/A;    PERCUTANEOUS TRANSLUMINAL ANGIOPLASTY N/A 3/4/2021    Procedure: PTA (ANGIOPLASTY, PERCUTANEOUS, TRANSLUMINAL);  Surgeon: Teddy Huber MD;  Location: [facility] OR;  Service: Vascular;  Laterality: N/A;    REMOVAL OF ARTERIOVENOUS GRAFT Left 5/27/2021    Procedure: REMOVAL, GRAFT, LEFT LOWER EXTREMITY, WOUND EXPLORATION;  Surgeon: Teddy Huber MD;  Location: [facility] OR 2ND FLR;  Service: Vascular;  Laterality: Left;    REMOVAL OF NAIL OF DIGIT Left 3/9/2021    Procedure: REMOVAL, NAIL, DIGIT;  Surgeon: Rosio Mayes DPM;  Location: [facility] OR;  Service: Podiatry;  Laterality: Left;    THROMBECTOMY Left 3/4/2021    Procedure: THROMBECTOMY, LEFT LOWER EXTREMITY BYPASS GRAFT, ANGIOGRAM, POSSIBLE INTERVENTION, POSSIBLE LEFT LOWER EXTREMITY BYPASS;  Surgeon: Teddy Huber MD;  Location: [facility] OR;  Service: Vascular;  Laterality: Left;    THROMBECTOMY Left 4/29/2021    Procedure: GRAFT THROMBECTOMY, LEFT LOWER EXTREMITY;  Surgeon: Teddy Huber MD;  Location: [facility] OR 2ND FLR;  Service: Vascular;  Laterality: Left;  14.5 min  1179.85 mGy  341.01 Gycm2  240 ml dye    THROMBECTOMY  10/22/2021    Procedure: THROMBECTOMY;  Surgeon: Saad Arenas MD;  Location: [facility] CATH LAB/EP;  Service: Cardiology;;       Review of patient's allergies indicates:   Allergen Reactions    Ciprofloxacin Itching    Contrast media      Kidney injury    Iodine      Kidney injury       Current Facility-Administered Medications on File Prior to Encounter   Medication    lactated ringers infusion    [DISCONTINUED] acetaminophen tablet    [DISCONTINUED] allopurinoL tablet    [DISCONTINUED] aluminum-magnesium hydroxide-simethicone 200-200-20 mg/5 mL suspension    [DISCONTINUED] apixaban tablet    [DISCONTINUED] atorvastatin tablet    [DISCONTINUED] bisacodyL suppository     [DISCONTINUED] clopidogreL tablet    [DISCONTINUED] dextrose 40 % gel    [DISCONTINUED] furosemide tablet    [DISCONTINUED] GENERIC EXTERNAL MEDICATION    [DISCONTINUED] GENERIC EXTERNAL MEDICATION    [DISCONTINUED] GENERIC EXTERNAL MEDICATION    [DISCONTINUED] GENERIC EXTERNAL MEDICATION    [DISCONTINUED] GENERIC EXTERNAL MEDICATION    [DISCONTINUED] GENERIC EXTERNAL MEDICATION    [DISCONTINUED] GENERIC EXTERNAL MEDICATION    [DISCONTINUED] glucagon SolR    [DISCONTINUED] insulin glargine injection    [DISCONTINUED] insulin lispro injection    [DISCONTINUED] lactated ringers infusion    [DISCONTINUED] magnesium hydroxide 400 mg/5 ml suspension    [DISCONTINUED] melatonin tablet    [DISCONTINUED] mirtazapine tablet    [DISCONTINUED] oxyCODONE-acetaminophen  mg per tablet    [DISCONTINUED] pregabalin capsule    [DISCONTINUED] senna-docusate 8.6-50 mg per tablet    [DISCONTINUED] sertraline tablet    [DISCONTINUED] simethicone chewable tablet    [DISCONTINUED] sodium bicarbonate tablet    [DISCONTINUED] vancomycin (VANCOCIN) 750 mg in dextrose 5 % 150 mL IVPB     Current Outpatient Medications on File Prior to Encounter   Medication Sig    acetaminophen (TYLENOL) 500 MG tablet Take 2 tablets (1,000 mg total) by mouth every 8 (eight) hours as needed for Pain.    allopurinoL (ZYLOPRIM) 100 MG tablet Take 1 tablet (100 mg total) by mouth once daily.    apixaban (ELIQUIS) 5 mg Tab Take 1 tablet (5 mg total) by mouth 2 (two) times daily.    atorvastatin (LIPITOR) 80 MG tablet Take 1 tablet (80 mg total) by mouth every evening.    blood-glucose meter,continuous (DEXCOM G6 ) Misc Use to check blood sugars 4 times/day    blood-glucose sensor (DEXCOM G6 SENSOR) Radha Apply one sensor to skin every 10 days    blood-glucose transmitter (DEXCOM G6 TRANSMITTER) Radha Replace transmitter every 3 months to use with dexcom sensor    clopidogreL (PLAVIX) 75 mg tablet Take 1 tablet (75 mg  total) by mouth once daily.    furosemide (LASIX) 40 MG tablet Take 1 tablet (40 mg total) by mouth once daily.    HYDROcodone-acetaminophen (NORCO) 5-325 mg per tablet Take 1 tablet by mouth every 4 (four) hours as needed for Pain.    insulin aspart U-100 (NOVOLOG FLEXPEN U-100 INSULIN) 100 unit/mL (3 mL) InPn pen Inject 5 Units into the skin 3 (three) times daily with meals. If not eating, ok to hold    insulin detemir U-100 (LEVEMIR FLEXTOUCH) 100 unit/mL (3 mL) SubQ InPn pen Inject 12 Units into the skin every evening.    magnesium oxide (MAG-OX) 400 mg (241.3 mg magnesium) tablet Take 1 tablet (400 mg total) by mouth 2 (two) times daily.    melatonin (MELATIN) 3 mg tablet Take 2 tablets (6 mg total) by mouth nightly as needed for Insomnia.    MEROPENEM 1 G/100 ML NS, READY TO MIX SYSTEM, Inject 100 mLs (1 g total) into the vein every 12 (twelve) hours. for 10 days    mirtazapine (REMERON) 7.5 MG Tab Take 2 tablets (15 mg total) by mouth nightly.    multivit/folic acid/vit K1 (ONE-A-DAY WOMEN'S 50 PLUS ORAL) Take 1 tablet by mouth Daily.    oxyCODONE-acetaminophen (PERCOCET)  mg per tablet Take 1 tablet by mouth every 6 (six) hours as needed.    pregabalin (LYRICA) 50 MG capsule Take 3 capsules (150 mg total) by mouth 3 (three) times daily.    sertraline (ZOLOFT) 50 MG tablet Take 1 tablet (50 mg total) by mouth once daily.    sodium bicarbonate 650 MG tablet Take 1 tablet (650 mg total) by mouth 2 (two) times daily.    [DISCONTINUED] lancing device Misc 1 Device by Misc.(Non-Drug; Combo Route) route 2 (two) times daily with meals.     Family History     Problem Relation (Age of Onset)    Diabetes Mother, Father, Paternal Grandmother    Heart disease Maternal Grandmother    No Known Problems Maternal Grandfather, Paternal Grandfather        Tobacco Use    Smoking status: Former Smoker    Smokeless tobacco: Never Used   Substance and Sexual Activity    Alcohol use: No    Drug use: Yes      Types: Marijuana     Comment: occassional    Sexual activity: Yes     Partners: Male     Review of Systems   Unable to perform ROS: Mental status change     Objective:     Vital Signs (Most Recent):  Temp: 98.5 °F (36.9 °C) (01/31/22 1954)  Pulse: 102 (01/31/22 2013)  Resp: (!) 22 (01/31/22 2013)  BP: (!) 97/54 (01/31/22 1954)  SpO2: 96 % (01/31/22 2013) Vital Signs (24h Range):  Temp:  [98 °F (36.7 °C)-99.8 °F (37.7 °C)] 98.5 °F (36.9 °C)  Pulse:  [] 102  Resp:  [15-22] 22  SpO2:  [94 %-100 %] 96 %  BP: ()/(54-73) 97/54     Weight: 67.6 kg (149 lb 0.5 oz)  Body mass index is 24.8 kg/m².    Physical Exam  Vitals and nursing note reviewed.   Constitutional:       General: She is in acute distress.      Appearance: She is ill-appearing and toxic-appearing.   HENT:      Head: Normocephalic and atraumatic.      Nose: No rhinorrhea.      Mouth/Throat:      Mouth: Mucous membranes are moist.   Cardiovascular:      Rate and Rhythm: Regular rhythm. Tachycardia present.      Heart sounds: Normal heart sounds. No murmur heard.      Pulmonary:      Effort: Pulmonary effort is normal. No respiratory distress.      Breath sounds: Normal breath sounds. No wheezing.   Abdominal:      General: Abdomen is flat. Bowel sounds are normal.      Palpations: Abdomen is soft.      Tenderness: There is no abdominal tenderness.   Musculoskeletal:         General: Tenderness, deformity and signs of injury present.   Skin:     Findings: Lesion present.   Neurological:      Mental Status: She is disoriented.   Psychiatric:      Comments: Emotionally labile                       Significant Labs:   CBC:   Recent Labs   Lab 01/31/22  1613   WBC 14.72*   HGB 6.6*   HCT 22.2*        CMP:   Recent Labs   Lab 01/31/22  1613      K 5.4*      CO2 26   GLU 79   BUN 64*   CREATININE 2.1*   CALCIUM 9.7   PROT 8.1   ALBUMIN 1.9*   BILITOT 0.3   ALKPHOS 117   AST 33   ALT 15   ANIONGAP 13   EGFRNONAA 25.9*       Significant  Imaging: I have reviewed all pertinent imaging results/findings within the past 24 hours.    Assessment/Plan:     * Infected wound  Ms. Connelly is a 57 yo female s/p r BKA and hx of Pseudomonas infection at the site of surgery presenting with AMS, leukocytosis, and elevated procalc  - likely sepsis due to wound infection/OM  - ESR and CRP slightly elevated  - Procalc of 0.38    Plan  - wound care consulted  - blood cx x2, wound cx, resp cx  - urine cx in process to r/o other source  - CBC daily to trend WCC  - cefepime and vanc given hx of Pseudomonal infection, azithro to cover possible PNA  - ID consulted, appreciate recs  -MRI Sacrum and R leg to r/o OM  - Gen Surg consult given repeated wound infection s/p BKA      Myoclonus  Patient has myoclonal jerks on exam  - likely in setting of pregabalin build with ROSLYN    Plan  - holding home pregabalin  - neuro checks      ROSLYN (acute kidney injury)  HPatient with acute kidney injury likely d/t IVVD/Dehydration Which is currently worsening. Labs reviewed- Renal function/electrolytes with Estimated Creatinine Clearance: 27.2 mL/min (A) (based on SCr of 2.1 mg/dL (H)). according to latest data. Monitor urine output and serial BMP and adjust therapy as needed. Avoid nephrotoxins and renally dose meds for GFR listed above.     Plan  - strict I/Os  - renal lytes pending  - renally dose meds  - avoid nephrotoxic agents      Altered mental state  Likely in setting of infection    Plan  - neuro checks  - delirium precautions  - CT Head to r/o other causes      Decubitus ulcer of sacral region, unstageable  Wound care consulted, appreciate recs    MRI Sacrum W Out contrast (in setting of ROSLYN) ordered    S/P BKA (below knee amputation) unilateral, right  Patient had recent BKA at [facility] with recent debridement last week  - patient has active drainage from wound with continued pain and complications    Plan  - morphine 4 mg q4 PRN for severe pain  - Wound Care consulted,  appreciate recs  - Gen Surg consulted, appreciate recs      Hypoalbuminemia  Boost glucose control TIDWM once patient is cleared for diet      Impaired functional mobility and endurance  PT/OT consulted, appreciate recs      Type 2 diabetes mellitus with diabetic nephropathy, with long-term current use of insulin  Last A1C 12/21 was 9.6    Plan  - patient is NPO  - MDSSI q6 PRN with POCT glu q6  - detemir 10 QD  - increase basal and add bolus regimen per requirements      Acute blood loss anemia  Patient has Hgb of 6.6 on admission    Plan  - transfuse 1U pRBC  - CBC daily, transfuse Hgb < 7      Mixed hyperlipidemia  Patient on atorvastatin at home    Plan  - continue home med once MS improves      CAROLIN (generalized anxiety disorder)  Patient on melatonin, remeron, zoloft     Plan  - consider resuming as patient's MS improves      Moderate malnutrition  Boost TIDWM once MS improves         VTE Risk Mitigation (From admission, onward)         Ordered     IP VTE HIGH RISK PATIENT  Once         01/31/22 1930     Place sequential compression device  Until discontinued         01/31/22 1930                   Kp Fair MD  Department of Hospital Medicine   [facility] - [facility] Dept

## 2022-02-01 NOTE — CONSULTS
[facility] Surg  General Surgery  Consult Note    Patient Name: Suyapa Connelly  MRN: [id number]  Code Status: Prior  Admission Date: 1/31/2022  Hospital Length of Stay: 0 days  Attending Physician: Jacinto Eugene MD  Primary Care Provider: Magen Christensen MD    Patient information was obtained from patient and past medical records.     Inpatient consult to General Surgery  Consult performed by: Mauricio Harding MD  Consult ordered by: Tracy Blair MD        Subjective:     Principal Problem: Severe sepsis    History of Present Illness: Suyapa Connelly is a 56 y.o. female with PMHx of CKD III, DM, GERD, HLD, HTN, PAD with extensive history of vascular interventions ultimately with L AKA in May 2021 and now R BKA in December 2021 with stump revision on 1/24/22 who was sent here from rehab facility with confusion yesterday. Difficult to obtain clear history from patient, however, she does note ongoing pain at the R BKA stump site. Upon presentation here, noted to have drainage from the stump as well. WBC elevated at 15, though afebrile. MRI obtained which demonstrates edema at R BKA stump site with small fluid collection around distal tibia. Her dressing was changed today, has had bleeding from site afterward.   She underwent stump revision due to evidence of infection, culture positive for Pseudomonas. She is currently on Vanc, Meropenem, and Azithromycin.       Current Facility-Administered Medications on File Prior to Encounter   Medication    [DISCONTINUED] GENERIC EXTERNAL MEDICATION    [DISCONTINUED] GENERIC EXTERNAL MEDICATION    [DISCONTINUED] GENERIC EXTERNAL MEDICATION    [DISCONTINUED] lactated ringers infusion     Current Outpatient Medications on File Prior to Encounter   Medication Sig    acetaminophen (TYLENOL) 500 MG tablet Take 2 tablets (1,000 mg total) by mouth every 8 (eight) hours as needed for Pain.    allopurinoL (ZYLOPRIM) 100 MG tablet Take 1 tablet (100 mg total) by mouth  once daily.    apixaban (ELIQUIS) 5 mg Tab Take 1 tablet (5 mg total) by mouth 2 (two) times daily.    atorvastatin (LIPITOR) 80 MG tablet Take 1 tablet (80 mg total) by mouth every evening.    blood-glucose meter,continuous (DEXCOM G6 ) Misc Use to check blood sugars 4 times/day    blood-glucose sensor (DEXCOM G6 SENSOR) Radha Apply one sensor to skin every 10 days    blood-glucose transmitter (DEXCOM G6 TRANSMITTER) Radha Replace transmitter every 3 months to use with dexcom sensor    clopidogreL (PLAVIX) 75 mg tablet Take 1 tablet (75 mg total) by mouth once daily.    furosemide (LASIX) 40 MG tablet Take 1 tablet (40 mg total) by mouth once daily.    HYDROcodone-acetaminophen (NORCO) 5-325 mg per tablet Take 1 tablet by mouth every 4 (four) hours as needed for Pain.    insulin aspart U-100 (NOVOLOG FLEXPEN U-100 INSULIN) 100 unit/mL (3 mL) InPn pen Inject 5 Units into the skin 3 (three) times daily with meals. If not eating, ok to hold    insulin detemir U-100 (LEVEMIR FLEXTOUCH) 100 unit/mL (3 mL) SubQ InPn pen Inject 12 Units into the skin every evening.    magnesium oxide (MAG-OX) 400 mg (241.3 mg magnesium) tablet Take 1 tablet (400 mg total) by mouth 2 (two) times daily.    melatonin (MELATIN) 3 mg tablet Take 2 tablets (6 mg total) by mouth nightly as needed for Insomnia.    MEROPENEM 1 G/100 ML NS, READY TO MIX SYSTEM, Inject 100 mLs (1 g total) into the vein every 12 (twelve) hours. for 10 days    mirtazapine (REMERON) 7.5 MG Tab Take 2 tablets (15 mg total) by mouth nightly.    multivit/folic acid/vit K1 (ONE-A-DAY WOMEN'S 50 PLUS ORAL) Take 1 tablet by mouth Daily.    oxyCODONE-acetaminophen (PERCOCET)  mg per tablet Take 1 tablet by mouth every 6 (six) hours as needed.    pregabalin (LYRICA) 50 MG capsule Take 3 capsules (150 mg total) by mouth 3 (three) times daily.    sertraline (ZOLOFT) 50 MG tablet Take 1 tablet (50 mg total) by mouth once daily.    sodium bicarbonate  650 MG tablet Take 1 tablet (650 mg total) by mouth 2 (two) times daily.    [DISCONTINUED] lancing device Misc 1 Device by Misc.(Non-Drug; Combo Route) route 2 (two) times daily with meals.       Review of patient's allergies indicates:   Allergen Reactions    Ciprofloxacin Itching    Contrast media      Kidney injury    Iodine      Kidney injury       Past Medical History:   Diagnosis Date    Anxiety     Chronic pain syndrome     CKD (chronic kidney disease), stage III     Depression     Diabetes mellitus     type 2    Diabetes mellitus, type 2     GERD (gastroesophageal reflux disease)     Hyperemesis 3/23/2021    Hyperlipemia     Hypertension     Hypokalemia 3/23/2021    Myocardial infarction 2010    minor-caused by stress per pt.    Osteomyelitis     Osteomyelitis of left foot 4/30/2021    PVD (peripheral vascular disease)     Vaginal delivery     x1     Past Surgical History:   Procedure Laterality Date    ABOVE-KNEE AMPUTATION Left 5/18/2021    Procedure: AMPUTATION, ABOVE KNEE;  Surgeon: Teddy Huber MD;  Location: [facility] OR [address] [place] [address];  Service: Vascular;  Laterality: Left;    Angiogram - Right Extremity Right 7/9/15    angiogram-left leg  10/6/15    ANGIOGRAPHY OF LOWER EXTREMITY Left 4/29/2021    Procedure: ANGIOGRAM, LOWER EXTREMITY;  Surgeon: Teddy Huber MD;  Location: [address];  Service: Vascular;  Laterality: Left;    BELOW KNEE AMPUTATION OF LOWER EXTREMITY Right 12/28/2021    Procedure: AMPUTATION, BELOW KNEE;  Surgeon: Kaitlyn Rojas MD;  Location: [facility] OR;  Service: General;  Laterality: Right;    CATHETERIZATION OF BOTH LEFT AND RIGHT HEART N/A 12/18/2019    Procedure: CATHETERIZATION, HEART, BOTH LEFT AND RIGHT;  Surgeon: Que Fernando III, MD;  Location: [facility] CATH LAB;  Service: Cardiology;  Laterality: N/A;    CORONARY ANGIOGRAPHY N/A 12/18/2019    Procedure: ANGIOGRAM, CORONARY ARTERY;  Surgeon: Que Fernando III, MD;  Location:  [facility] CATH LAB;  Service: Cardiology;  Laterality: N/A;    CORONARY ANGIOGRAPHY INCLUDING BYPASS GRAFTS WITH CATHETERIZATION OF LEFT HEART N/A 7/28/2020    Procedure: ANGIOGRAM, CORONARY, INCLUDING BYPASS GRAFT, WITH LEFT HEART CATHETERIZATION, 9 am;  Surgeon: Rachel Easley MD;  Location: [facility] CATH LAB;  Service: Cardiology;  Laterality: N/A;    CORONARY ARTERY BYPASS GRAFT (CABG) N/A 1/14/2020    Procedure: CORONARY ARTERY BYPASS GRAFT (CABG) x 1     Off Pump;  Surgeon: Huang Altamirano MD;  Location: [facility] OR [address] [place] [address];  Service: Cardiovascular;  Laterality: N/A;    CREATION OF FEMORAL-TIBIAL ARTERY BYPASS Left 4/29/2021    Procedure: CREATION, BYPASS, ARTERIAL, FEMORAL TO ANTERIOR TIBIAL;  Surgeon: Teddy Huber MD;  Location: [facility] OR [address] [place] [address];  Service: Vascular;  Laterality: Left;    CREATION OF FEMOROPOPLITEAL ARTERIAL BYPASS USING GRAFT Left 8/18/2020    Procedure: CREATION, BYPASS, ARTERIAL, FEMORAL TO POPLITEAL, USING GRAFT, LEFT LOWER EXTREMITY;  Surgeon: Teddy Huber MD;  Location: [facility];  Service: Vascular;  Laterality: Left;  REQUEST 7:15 A.M. START----COVID NEGATIVE ON 8/17 1ST CASE JASWANT PER LEANA ON 8/7/2020 @ 942AM-LO  RN PREOP 8/12/2020   T/S-----CLEARED BY CARDS-------PENDING INSURANCE    DEBRIDEMENT OF FOOT Left 9/8/2020    Procedure: DEBRIDEMENT, FOOT;  Surgeon: Rosio Mayes DPM;  Location: [facility] OR;  Service: Podiatry;  Laterality: Left;  request neoxx .   RN Pre Op 9-4-2020, Covid negative on 9/5/20. C A    DEBRIDEMENT OF FOOT  3/4/2021    Procedure: DEBRIDEMENT, FOOT;  Surgeon: Teddy Huber MD;  Location: [facility] OR;  Service: Vascular;;    DEBRIDEMENT OF FOOT Left 3/9/2021    Procedure: DEBRIDEMENT, FOOT, bone biopsy;  Surgeon: Rosio Mayes DPM;  Location: [facility] OR;  Service: Podiatry;  Laterality: Left;  Request neoxx---COVID IN AM  REQUESTING NOON START  RN Phone Pre op.On Blood thinners Plavix and Eliquis.  Covid am of surgery. C A    DEBRIDEMENT OF  FOOT Left 5/4/2021    Procedure: DEBRIDEMENT, FOOT;  Surgeon: Farooq Morley DPM;  Location: [facility] OR 2ND FLR;  Service: Podiatry;  Laterality: Left;    INSERTION OF TUNNELED CENTRAL VENOUS HEMODIALYSIS CATHETER N/A 1/27/2020    Procedure: Insertion, Catheter, Central Venous, Hemodialysis;  Surgeon: ESTEBAN Gomez III, MD;  Location: [facility] CATH LAB;  Service: Peripheral Vascular;  Laterality: N/A;    PERCUTANEOUS TRANSLUMINAL ANGIOPLASTY N/A 3/4/2021    Procedure: PTA (ANGIOPLASTY, PERCUTANEOUS, TRANSLUMINAL);  Surgeon: Teddy Huber MD;  Location: [facility] OR;  Service: Vascular;  Laterality: N/A;    REMOVAL OF ARTERIOVENOUS GRAFT Left 5/27/2021    Procedure: REMOVAL, GRAFT, LEFT LOWER EXTREMITY, WOUND EXPLORATION;  Surgeon: Teddy Huber MD;  [facility];  Service: Vascular;  Laterality: Left;    REMOVAL OF NAIL OF DIGIT Left 3/9/2021    Procedure: REMOVAL, NAIL, DIGIT;  Surgeon: Rosio Mayes DPM;  Location: [facility] OR;  Service: Podiatry;  Laterality: Left;    THROMBECTOMY Left 3/4/2021    Procedure: THROMBECTOMY, LEFT LOWER EXTREMITY BYPASS GRAFT, ANGIOGRAM, POSSIBLE INTERVENTION, POSSIBLE LEFT LOWER EXTREMITY BYPASS;  Surgeon: Teddy Huber MD;  Location: [facility] OR;  Service: Vascular;  Laterality: Left;    THROMBECTOMY Left 4/29/2021    Procedure: GRAFT THROMBECTOMY, LEFT LOWER EXTREMITY;  Surgeon: Teddy Huber MD;  [address]  Service: Vascular;  Laterality: Left;  14.5 min  1179.85 mGy  341.01 Gycm2  240 ml dye    THROMBECTOMY  10/22/2021    Procedure: THROMBECTOMY;  Surgeon: Saad Arenas MD;  Location: [facility] CATH LAB/EP;  Service: Cardiology;;     Family History     Problem Relation (Age of Onset)    Diabetes Mother, Father, Paternal Grandmother    Heart disease Maternal Grandmother    No Known Problems Maternal Grandfather, Paternal Grandfather        Tobacco Use    Smoking status: Former Smoker    Smokeless tobacco: Never Used   Substance  and Sexual Activity    Alcohol use: No    Drug use: Yes     Types: Marijuana     Comment: occassional    Sexual activity: Yes     Partners: Male     Review of Systems   Constitutional: Negative for chills and fever.   HENT: Negative for hearing loss and voice change.    Eyes: Negative for discharge and redness.   Respiratory: Negative for shortness of breath.    Cardiovascular: Negative for chest pain.   Gastrointestinal: Negative for abdominal pain, diarrhea and vomiting.   Musculoskeletal: Positive for gait problem.        L AKA  R BKA   Skin: Negative for pallor.   Neurological: Negative for dizziness and weakness.   Psychiatric/Behavioral: Positive for confusion.     Objective:     Vital Signs (Most Recent):  Temp: 98 °F (36.7 °C) (02/01/22 1608)  Pulse: 98 (02/01/22 1608)  Resp: 18 (02/01/22 1608)  BP: (!) 112/53 (02/01/22 1608)  SpO2: (!) 92 % (02/01/22 1608) Vital Signs (24h Range):  Temp:  [98 °F (36.7 °C)-99.6 °F (37.6 °C)] 98 °F (36.7 °C)  Pulse:  [] 98  Resp:  [13-22] 18  SpO2:  [92 %-100 %] 92 %  BP: ()/(47-97) 112/53     Weight: 67.6 kg (149 lb 0.5 oz)  Body mass index is 24.8 kg/m².    Physical Exam  Vitals reviewed.   Constitutional:       General: She is not in acute distress.     Appearance: She is well-developed and well-nourished.   HENT:      Head: Normocephalic and atraumatic.      Mouth/Throat:      Mouth: Oropharynx is clear and moist.   Eyes:      General:         Right eye: No discharge.         Left eye: No discharge.      Extraocular Movements: EOM normal.      Conjunctiva/sclera: Conjunctivae normal.   Cardiovascular:      Rate and Rhythm: Normal rate and regular rhythm.   Pulmonary:      Effort: Pulmonary effort is normal. No respiratory distress.   Abdominal:      General: There is no distension.      Palpations: Abdomen is soft.   Musculoskeletal:         General: Deformity present.      Cervical back: Normal range of motion.      Comments: S/p L AKA  S/p R BKA, slow  ooze from medial aspect of wound, mild wound dehiscence but incisions in place, no evidence of purulence or surrounding erythema   Skin:     General: Skin is warm and dry.   Neurological:      Mental Status: She is alert and oriented to person, place, and time.   Psychiatric:         Mood and Affect: Mood and affect normal.         Behavior: Behavior normal.         Significant Labs:  I have reviewed all pertinent lab results within the past 24 hours.  CBC:   Recent Labs   Lab 02/01/22 0428   WBC 15.00*   RBC 2.37*   HGB 7.1*   HCT 23.3*      MCV 98   MCH 30.0   MCHC 30.5*     CMP:   Recent Labs   Lab 02/01/22 0428   GLU 93   CALCIUM 9.4   ALBUMIN 1.9*   PROT 7.4      K 4.7   CO2 28      BUN 58*   CREATININE 1.8*   ALKPHOS 111   ALT 14   AST 26   BILITOT 0.6       Significant Diagnostics:  I have reviewed all pertinent imaging results/findings within the past 24 hours.     MRI Tib/Fib:  FINDINGS:  Please note the study is incomplete and was prematurely terminated secondary to patient discomfort.     There is postsurgical change of recent below the knee right lower extremity amputation.  There is soft tissue edema about the distal tibial and fibular stumps presumably postoperative in etiology.  There is small volume of relatively ill-defined fluid about the distal tibial stump posteriorly.  There is minimal marrow edema at the resection margins of the distal tibia and fibula without definite MRI evidence to suggest osteomyelitis.     Visualized portions of the right knee demonstrate no acute abnormalities.  There is a small joint effusion present.     Impression:     Limited examination, incompletely and prematurity terminated secondary to patient discomfort.     Postsurgical change of right below-the-knee amputation with mild soft tissue edema and small volume ill-defined fluid about the distal tibial resection stump.  No discrete well-defined collection appreciated on this limited exam, however  repeat assessment as warranted.  No definite MRI evidence to suggest osteomyelitis.    Assessment/Plan:     S/P BKA (below knee amputation) unilateral, right  56 y.o. female with PMHx of CKD III, DM, GERD, HLD, HTN, PAD with extensive history of vascular interventions ultimately with L AKA in May 2021 and now R BKA in December 2021 with stump revision on 1/24/22. General surgery consulted to evaluate wound as source of elevated WBC.     - No evidence of purulent drainage or surrounding erythema. Stump is mild to moderately swollen with oozing noted on exam. MRI of R stump shows edema and small fluid collection (can be expected with Pt on POD8) adjacent to tibia, negative for osteomyelitis. We will continue to follow and evaluate her wound for signs of infection, no surgical intervention recommended at this time.   - Remainder of care per primary  - Please call with questions      VTE Risk Mitigation (From admission, onward)         Ordered     IP VTE HIGH RISK PATIENT  Once         01/31/22 1930     Place sequential compression device  Until discontinued         01/31/22 1930                  Mauricio Harding MD  General Surgery  [facility]

## 2022-02-01 NOTE — PROGRESS NOTES
Pharmacokinetic Assessment Follow Up: IV Vancomycin    Vancomycin serum concentration assessment(s):    Vancomycin concentration = 11.3 mcg/mL    Vancomycin Regimen Plan:  1. Vancomycin 1000 mg IV x 1 today  2. Obtain concentration in AM on 2/2  3. Goal trough = 15-20 mcg/mL    Thank you for the consult, will continue to follow  Adalberto Cummings Pharm.D., BCPS  [id number]       Patient brief summary:  Suyapa Connelly is a 56 y.o. female initiated on antimicrobial therapy with IV Vancomycin for treatment of sepsis 2/2 BKA wound    Drug levels (last 3 results):  Recent Labs   Lab Result Units 02/01/22  1000   Vancomycin, Random ug/mL 11.3     Drug Allergies:   Review of patient's allergies indicates:   Allergen Reactions    Ciprofloxacin Itching    Contrast media      Kidney injury    Iodine      Kidney injury       Actual Body Weight:   67.6 kg    Renal Function:   Estimated Creatinine Clearance: 31.8 mL/min (A) (based on SCr of 1.8 mg/dL (H)).,       CBC (last 72 hours):  Recent Labs   Lab Result Units 01/31/22  1613 02/01/22  0428   WBC K/uL 14.72* 15.00*   Hemoglobin g/dL 6.6* 7.1*   Hematocrit % 22.2* 23.3*   Platelets K/uL 386 382   Gran % % 78.6* 77.0*   Lymph % % 11.7* 12.9*   Mono % % 7.9 7.5   Eosinophil % % 1.0 1.6   Basophil % % 0.5 0.7   Differential Method  Automated Automated       Metabolic Panel (last 72 hours):  Recent Labs   Lab Result Units 01/31/22  1613 01/31/22  1751 01/31/22  2126 02/01/22  0039 02/01/22  0428   Sodium mmol/L 139  --  139  --  139   Sodium, Urine mmol/L  --  69  --   --   --    Potassium mmol/L 5.4*  --  4.9 5.1 4.7   Chloride mmol/L 100  --  103  --  100   CO2 mmol/L 26  --  24  --  28   Glucose mg/dL 79  --  86  --  93   Glucose, UA   --  1+*  --   --   --    BUN mg/dL 64*  --  62*  --  58*   Creatinine mg/dL 2.1*  --  1.9*  --  1.8*   Creatinine, Urine mg/dL  --  63.0  --   --   --    Albumin g/dL 1.9*  --   --   --  1.9*   Total Bilirubin mg/dL 0.3  --   --   --  0.6   Alkaline  Phosphatase U/L 117  --   --   --  111   AST U/L 33  --   --   --  26   ALT U/L 15  --   --   --  14   Magnesium mg/dL 2.7*  --   --   --  2.7*   Phosphorus mg/dL 7.1*  --   --   --  6.2*       Vancomycin Administrations:  vancomycin given in the last 96 hours      No antibiotic orders with administrations found.                Microbiologic Results:  Microbiology Results (last 7 days)     Procedure Component Value Units Date/Time    Blood culture x two cultures. Draw prior to antibiotics. [[id number]] Collected: 01/31/22 1613    Order Status: Completed Specimen: Blood from Peripheral, Wrist, Right Updated: 02/01/22 0115     Blood Culture, Routine No Growth to date    Narrative:      Aerobic and anaerobic    Blood culture x two cultures. Draw prior to antibiotics. [[id number]] Collected: 01/31/22 1614    Order Status: Completed Specimen: Blood from Peripheral, Hand, Right Updated: 02/01/22 0115     Blood Culture, Routine No Growth to date    Narrative:      Aerobic and anaerobic    Aerobic culture [[id number]] Collected: 01/31/22 2145    Order Status: Sent Specimen: Wound from Leg, Left Updated: 01/31/22 2153    Culture, Respiratory with Gram Stain [[id number]]     Order Status: No result Specimen: Respiratory     Urine culture [[id number]] Collected: 01/31/22 1751    Order Status: No result Specimen: Urine Updated: 01/31/22 1826

## 2022-02-01 NOTE — ED NOTES
Pt changed and mepilex dressing placed on sacral wound. Pt provided with fresh linens and wedge pillow replaced so patient is laying on right side.

## 2022-02-01 NOTE — PROGRESS NOTES
Pharmacokinetic Initial Assessment: IV Vancomycin    Assessment/Plan:    ROSLYN, baseline Scr ~0.9-1. 0    Initiate intravenous vancomycin with loading dose of 1750 mg once with subsequent doses when random concentrations are less than 20 mcg/mL  Desired empiric serum trough concentration is 10 to 20 mcg/mL  Draw vancomycin random level on 2/1 at 1000. (12 hrs post dose)  Pharmacy will continue to follow and monitor vancomycin.      Please contact pharmacy at extension [phone] with any questions regarding this assessment.     Thank you for the consult,   Courtney Navarro       Patient brief summary:  Suyapa Connelly is a 56 y.o. female initiated on antimicrobial therapy with IV Vancomycin for treatment of suspected skin & soft tissue infection    Drug Allergies:   Review of patient's allergies indicates:   Allergen Reactions    Ciprofloxacin Itching    Contrast media      Kidney injury    Iodine      Kidney injury       Actual Body Weight:   67.6 kg    Renal Function:   Estimated Creatinine Clearance: 27.2 mL/min (A) (based on SCr of 2.1 mg/dL (H)).,     Dialysis Method (if applicable):  N/A    CBC (last 72 hours):  Recent Labs   Lab Result Units 01/31/22  1613   WBC K/uL 14.72*   Hemoglobin g/dL 6.6*   Hematocrit % 22.2*   Platelets K/uL 386   Gran % % 78.6*   Lymph % % 11.7*   Mono % % 7.9   Eosinophil % % 1.0   Basophil % % 0.5   Differential Method  Automated       Metabolic Panel (last 72 hours):  Recent Labs   Lab Result Units 01/31/22  1613 01/31/22  1751   Sodium mmol/L 139  --    Potassium mmol/L 5.4*  --    Chloride mmol/L 100  --    CO2 mmol/L 26  --    Glucose mg/dL 79  --    Glucose, UA   --  1+*   BUN mg/dL 64*  --    Creatinine mg/dL 2.1*  --    Albumin g/dL 1.9*  --    Total Bilirubin mg/dL 0.3  --    Alkaline Phosphatase U/L 117  --    AST U/L 33  --    ALT U/L 15  --    Magnesium mg/dL 2.7*  --    Phosphorus mg/dL 7.1*  --        Drug levels (last 3 results):  No results for input(s):  VANCOMYCINRA, VANCOMYCINPE, VANCOMYCINTR in the last 72 hours.    Microbiologic Results:  Microbiology Results (last 7 days)     Procedure Component Value Units Date/Time    Culture, Respiratory with Gram Stain [[id number]]     Order Status: No result Specimen: Respiratory     Aerobic culture [[id number]]     Order Status: No result Specimen: Wound     Urine culture [[id number]] Collected: 01/31/22 1751    Order Status: No result Specimen: Urine Updated: 01/31/22 1826    Blood culture x two cultures. Draw prior to antibiotics. [[id number]] Collected: 01/31/22 1614    Order Status: Sent Specimen: Blood from Peripheral, Hand, Right Updated: 01/31/22 1635    Blood culture x two cultures. Draw prior to antibiotics. [[id number]] Collected: 01/31/22 1613    Order Status: Sent Specimen: Blood from Peripheral, Wrist, Right Updated: 01/31/22 1635

## 2022-02-01 NOTE — CONSULTS
[facility] - Emergency Dept  Infectious Disease  Consult Note    Patient Name: Suyapa Connelly  MRN: [id number]  Admission Date: 1/31/2022  Hospital Length of Stay: 0 days  Attending Physician: Jacinto Eugene MD  Primary Care Provider: Magen Christensen MD     Isolation Status: No active isolations    Inpatient consult to Infectious Diseases  Consult performed by: Toby Mora PA-C  Consult ordered by: Tracy Blair MD        Assessment/Plan:     S/P BKA (below knee amputation) unilateral, right  57 y/o female with DMII, HLD, CKD, PAD, L AKA 5/2021, R BKA 12/28/21 followed by stump revision 1/24/22 presents to ED from [facility] with confusion and bleeding and drainage from her R BKA stump site. She has been on IV meropenem for surgical stump site infection EOC 2/7/22     Pt remained afebrile,stable, no leukocytosis. Pt is currently on vanc/meropenem/azithromycin. MRI findings negative for osteomyelitis. Ill defined fluid collection near stump site. Sacral wound appears stable. No cough on examination. Pt reports twitching and weakness.     Recommendations  1. Continue vancomycin. Trough goal 15-20. Continue meropenem for now  2. Continue azithromycin for now. If procal negative will d/c azithromycin  3. Recommend gen surgery evaluation. Fluid collection noted on imaging. infected hematoma? If plans for revision recommend repeat cultures to further guide abx therapy.   4. Continue course of therapy.     Seen and discussed with ID staff. Will follow with you       Thank you for your consult. I will follow-up with patient. Please contact us if you have any additional questions.    Toby Mora PA-C  Infectious Disease  [facility] - [facility] Dept    Subjective:     Principal Problem: Severe sepsis    HPI: 57 y/o female with DMII, HLD, CKD, PAD, L AKA 5/2021 presents to ED from [facility] with confusion and bleeding and drainage from her surgical stump site. She was admitted at [facility] 12/2021 for  gangrene of her right foot s/p R BKA with Dr. Rojas on 12/28. She was discharged to rehab on 1/10. She was readmitted to [facility] for infection of her R BKA stump site and underwent I&D and revision with Dr. Rojas on 1/24. No surgical cultures obtained. Prior wound cultures +ESBL Klebsiella and Pseudomonas. She was seen by [facility] ID and was discharged on two week course of merpoenem, EOC 2/8/22.     Pt remained afebrile,stable, no leukocytosis. Pt is currently on vanc/meropenem/azithromycin. MRI findings negative for osteomyelitis. Ill defined fluid collection near stump site. Sacral wound appears stable. No cough on examination. Pt reports twitching and weakness.       Past Medical History:   Diagnosis Date    Anxiety     Chronic pain syndrome     CKD (chronic kidney disease), stage III     Depression     Diabetes mellitus     type 2    Diabetes mellitus, type 2     GERD (gastroesophageal reflux disease)     Hyperemesis 3/23/2021    Hyperlipemia     Hypertension     Hypokalemia 3/23/2021    Myocardial infarction 2010    minor-caused by stress per pt.    Osteomyelitis     Osteomyelitis of left foot 4/30/2021    PVD (peripheral vascular disease)     Vaginal delivery     x1       Past Surgical History:   Procedure Laterality Date    ABOVE-KNEE AMPUTATION Left 5/18/2021    Procedure: AMPUTATION, ABOVE KNEE;  Surgeon: Teddy Huber MD;  Location: [facility] OR [address] [place] [address];  Service: Vascular;  Laterality: Left;    Angiogram - Right Extremity Right 7/9/15    angiogram-left leg  10/6/15    ANGIOGRAPHY OF LOWER EXTREMITY Left 4/29/2021    Procedure: ANGIOGRAM, LOWER EXTREMITY;  Surgeon: Teddy Huber MD;  Location: [address];  Service: Vascular;  Laterality: Left;    BELOW KNEE AMPUTATION OF LOWER EXTREMITY Right 12/28/2021    Procedure: AMPUTATION, BELOW KNEE;  Surgeon: Kaitlyn Rojas MD;  Location: [facility] OR;  Service: General;  Laterality: Right;    CATHETERIZATION OF BOTH LEFT  AND RIGHT HEART N/A 12/18/2019    Procedure: CATHETERIZATION, HEART, BOTH LEFT AND RIGHT;  Surgeon: Que Fernando III, MD;  Location: [facility] CATH LAB;  Service: Cardiology;  Laterality: N/A;    CORONARY ANGIOGRAPHY N/A 12/18/2019    Procedure: ANGIOGRAM, CORONARY ARTERY;  Surgeon: Que Fernando III, MD;  Location: [facility] CATH LAB;  Service: Cardiology;  Laterality: N/A;    CORONARY ANGIOGRAPHY INCLUDING BYPASS GRAFTS WITH CATHETERIZATION OF LEFT HEART N/A 7/28/2020    Procedure: ANGIOGRAM, CORONARY, INCLUDING BYPASS GRAFT, WITH LEFT HEART CATHETERIZATION, 9 am;  Surgeon: Rachel Easley MD;  Location: [facility] CATH LAB;  Service: Cardiology;  Laterality: N/A;    CORONARY ARTERY BYPASS GRAFT (CABG) N/A 1/14/2020    Procedure: CORONARY ARTERY BYPASS GRAFT (CABG) x 1     Off Pump;  Surgeon: Huang Altamirano MD;  Location: [facility] OR [address] [place] [address];  Service: Cardiovascular;  Laterality: N/A;    CREATION OF FEMORAL-TIBIAL ARTERY BYPASS Left 4/29/2021    Procedure: CREATION, BYPASS, ARTERIAL, FEMORAL TO ANTERIOR TIBIAL;  Surgeon: Teddy Huber MD;  Location: [facility] OR [address] [place] [address];  Service: Vascular;  Laterality: Left;    CREATION OF FEMOROPOPLITEAL ARTERIAL BYPASS USING GRAFT Left 8/18/2020    Procedure: CREATION, BYPASS, ARTERIAL, FEMORAL TO POPLITEAL, USING GRAFT, LEFT LOWER EXTREMITY;  Surgeon: Teddy Huber MD;  Location: [facility];  Service: Vascular;  Laterality: Left;  REQUEST 7:15 A.M. START----COVID NEGATIVE ON 8/17  1ST CASE STARTE PER LEANA ON 8/7/2020 @ 942AM-  RN PREOP 8/12/2020   T/S-----CLEARED BY CARDS-------PENDING INSURANCE    DEBRIDEMENT OF FOOT Left 9/8/2020    Procedure: DEBRIDEMENT, FOOT;  Surgeon: Rosio Mayes DPM;  Location: [facility] OR;  Service: Podiatry;  Laterality: Left;  request neoxx .   RN Pre Op 9-4-2020, Covid negative on 9/5/20. C A    DEBRIDEMENT OF FOOT  3/4/2021    Procedure: DEBRIDEMENT, FOOT;  Surgeon: Teddy Huber MD;  Location: [facility] OR;  Service: Vascular;;     DEBRIDEMENT OF FOOT Left 3/9/2021    Procedure: DEBRIDEMENT, FOOT, bone biopsy;  Surgeon: Rosio Mayes DPM;  Location: [facility] OR;  Service: Podiatry;  Laterality: Left;  Request neoxx---COVID IN AM  REQUESTING NOON START  RN Phone Pre op.On Blood thinners Plavix and Eliquis.  Covid am of surgery. C A    DEBRIDEMENT OF FOOT Left 5/4/2021    Procedure: DEBRIDEMENT, FOOT;  Surgeon: Farooq Morley DPM;  Location: [facility] OR 2ND FLR;  Service: Podiatry;  Laterality: Left;    INSERTION OF TUNNELED CENTRAL VENOUS HEMODIALYSIS CATHETER N/A 1/27/2020    Procedure: Insertion, Catheter, Central Venous, Hemodialysis;  Surgeon: ESTEBAN Gomez III, MD;  Location: [facility] CATH LAB;  Service: Peripheral Vascular;  Laterality: N/A;    PERCUTANEOUS TRANSLUMINAL ANGIOPLASTY N/A 3/4/2021    Procedure: PTA (ANGIOPLASTY, PERCUTANEOUS, TRANSLUMINAL);  Surgeon: Teddy Huber MD;  Location: [facility] OR;  Service: Vascular;  Laterality: N/A;    REMOVAL OF ARTERIOVENOUS GRAFT Left 5/27/2021    Procedure: REMOVAL, GRAFT, LEFT LOWER EXTREMITY, WOUND EXPLORATION;  Surgeon: Teddy Huber MD;  Location: [facility] OR [facility] FLR;  Service: Vascular;  Laterality: Left;    REMOVAL OF NAIL OF DIGIT Left 3/9/2021    Procedure: REMOVAL, NAIL, DIGIT;  Surgeon: Rosio Mayes DPM;  Location: [facility] OR;  Service: Podiatry;  Laterality: Left;    THROMBECTOMY Left 3/4/2021    Procedure: THROMBECTOMY, LEFT LOWER EXTREMITY BYPASS GRAFT, ANGIOGRAM, POSSIBLE INTERVENTION, POSSIBLE LEFT LOWER EXTREMITY BYPASS;  Surgeon: Teddy Huber MD;  Location: [facility] OR;  Service: Vascular;  Laterality: Left;    THROMBECTOMY Left 4/29/2021    Procedure: GRAFT THROMBECTOMY, LEFT LOWER EXTREMITY;  Surgeon: Teddy Huber MD;  Location: [facility] OR 2ND FLR;  Service: Vascular;  Laterality: Left;  14.5 min  1179.85 mGy  341.01 Gycm2  240 ml dye    THROMBECTOMY  10/22/2021    Procedure: THROMBECTOMY;  Surgeon: Saad Arenas MD;  Location: [facility] CATH LAB/EP;   "Service: Cardiology;;       Review of patient's allergies indicates:   Allergen Reactions    Ciprofloxacin Itching    Contrast media      Kidney injury    Iodine      Kidney injury       Medications:  (Not in a hospital admission)    Antibiotics (From admission, onward)            Start     Stop Route Frequency Ordered    02/02/22 0900  azithromycin tablet 250 mg         02/05 0859 Oral Daily 02/01/22 1052    02/01/22 1530  vancomycin in dextrose 5 % 1 gram/250 mL IVPB 1,000 mg         -- IV Once 02/01/22 1421    01/31/22 2330  meropenem (MERREM) 2 g in sodium chloride 0.9% 100 mL IVPB         -- IV Every 12 hours (non-standard times) 01/31/22 2225 01/31/22 2059  vancomycin - pharmacy to dose  (vancomycin IVPB)        "And" Linked Group Details    -- IV pharmacy to manage frequency 01/31/22 2003        Antifungals (From admission, onward)            None        Antivirals (From admission, onward)    None           Immunization History   Administered Date(s) Administered    COVID-19, MRNA, LN-S, PF (Pfizer) (Purple Cap) 03/09/2021, 03/30/2021       Family History     Problem Relation (Age of Onset)    Diabetes Mother, Father, Paternal Grandmother    Heart disease Maternal Grandmother    No Known Problems Maternal Grandfather, Paternal Grandfather        Social History     Socioeconomic History    Marital status:    Occupational History    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      Social Determinants of Health     Financial Resource Strain: High Risk    Difficulty of Paying Living Expenses: Hard   Food Insecurity: No Food Insecurity    Worried About Running Out of Food in the Last Year: Never true    Ran Out of Food " in the Last Year: Never true   Transportation Needs: No Transportation Needs    Lack of Transportation (Medical): No    Lack of Transportation (Non-Medical): No   Stress: Stress Concern Present    Feeling of Stress : To some extent   Social Connections: Unknown    Frequency of Communication with Friends and Family: More than three times a week    Frequency of Social Gatherings with Friends and Family: Three times a week    Marital Status:    Housing Stability: Low Risk     Unable to Pay for Housing in the Last Year: No    Number of Places Lived in the Last Year: 1    Unstable Housing in the Last Year: No     Review of Systems   Unable to perform ROS: Mental status change   Constitutional: Positive for fatigue. Negative for appetite change, chills, diaphoresis and fever.   Respiratory: Negative for cough and shortness of breath.    Cardiovascular: Negative for chest pain.   Gastrointestinal: Negative for abdominal pain and vomiting.   Genitourinary: Negative for dysuria.   Musculoskeletal: Negative for back pain.   Skin: Positive for wound. Negative for color change, pallor and rash.   Neurological: Positive for tremors and weakness.     Objective:     Vital Signs (Most Recent):  Temp: 98.4 °F (36.9 °C) (02/01/22 1241)  Pulse: 102 (02/01/22 1241)  Resp: 16 (02/01/22 1241)  BP: (!) 142/67 (02/01/22 1241)  SpO2: 100 % (02/01/22 1241) Vital Signs (24h Range):  Temp:  [98 °F (36.7 °C)-99.6 °F (37.6 °C)] 98.4 °F (36.9 °C)  Pulse:  [] 102  Resp:  [13-22] 16  SpO2:  [93 %-100 %] 100 %  BP: ()/(47-97) 142/67     Weight: 67.6 kg (149 lb 0.5 oz)  Body mass index is 24.8 kg/m².    Estimated Creatinine Clearance: 31.8 mL/min (A) (based on SCr of 1.8 mg/dL (H)).    Physical Exam  Vitals and nursing note reviewed.   Constitutional:       General: She is not in acute distress.     Appearance: She is well-developed and well-nourished. She is not diaphoretic.   HENT:      Head: Normocephalic and  atraumatic.   Eyes:      Extraocular Movements: EOM normal.      Pupils: Pupils are equal, round, and reactive to light.   Cardiovascular:      Rate and Rhythm: Normal rate and regular rhythm.      Pulses: Intact distal pulses.      Heart sounds: Normal heart sounds. No murmur heard.  No friction rub. No gallop.    Pulmonary:      Effort: Pulmonary effort is normal. No respiratory distress.      Breath sounds: Normal breath sounds. No wheezing or rales.   Chest:      Chest wall: No tenderness.   Abdominal:      General: Bowel sounds are normal. There is no distension.      Palpations: Abdomen is soft. There is no mass.      Tenderness: There is no abdominal tenderness. There is no guarding or rebound.      Hernia: No hernia is present.   Musculoskeletal:         General: Signs of injury present. No tenderness, deformity or edema.      Cervical back: Normal range of motion and neck supple.   Skin:     General: Skin is warm and dry.      Coloration: Skin is not pale.      Findings: No erythema.      Comments: Bleeding from surgical stump site   R AKA site c/d/i   Well healed   Neurological:      Mental Status: She is alert and oriented to person, place, and time.      Cranial Nerves: No cranial nerve deficit.      Coordination: Coordination normal.   Psychiatric:         Mood and Affect: Mood and affect normal.         Behavior: Behavior normal.         Thought Content: Thought content normal.         Significant Labs: All pertinent labs within the past 24 hours have been reviewed.    Significant Imaging: I have reviewed all pertinent imaging results/findings within the past 24 hours.

## 2022-02-01 NOTE — HOSPITAL COURSE
Patient was admitted to hospital 1/31 for AMS and concern about worsening appearance of RLE wound. Patient was started on antibiotics (cefepime, vancomycin & azithromycin), blood, wound, resp & urine samples were collected for culture due to concern for sepsis and ID and Gen Surg (s/p BKA on 12/28) were consulted. Pregabalin and mirtazapine withheld due to myoclonus, likely 2/2 to ROSLYN leading to increased serum concentrations. Patient's mental status slowly improving throughout hospitalization, likely AMS due to both pregabalin intox and sepsis. Azithromycin and vanc discontinued and continuing with meropenem. ID consulted and meropenem 7day course completed during admission. Holding pregabalin and mirtazapine due to return of myoclonus and AMS. Patient can re address should pain worsen with PCP. Nephro consulted for persistent ROSLYN (Cr between 1.5-1.7), states this is new baseline for CKD, but Cr has dropped to 1.2 on 2/10. Right BKA wound healing well. Medically ready for discharge to rehab.

## 2022-02-01 NOTE — ED NOTES
Blood transfusion stopped on pump by previous RN, before this RN took over. VS taken, pt assessed.      LOC: The patient is awake, alert and aware of environment with an appropriate affect, the patient is oriented x 3 and speaking appropriately. Pt lethargic but arousable   APPEARANCE: Patient appears comfortable and in no acute distress, patient is clean and well groomed.  SKIN: The skin is warm and dry, color consistent with ethnicity. Bruising noted in L AC. Midline noted in R arm   MUSCULOSKELETAL: Patient moving upper extremities spontaneously, no swelling noted. Bilateral leg amputation noted.   RESPIRATORY: Airway is open and patent, respirations are spontaneous, patient has a normal effort and rate, no accessory muscle use noted. Pt on 2 L NC  CARDIAC: Patient has a normal rate and regular rhythm, no edema noted, capillary refill < 3 seconds.   GASTRO: Soft and non tender to palpation, no distention noted.   : Pt denies any pain or frequency with urination.  NEURO: Pt opens eyes spontaneously, behavior appropriate to situation, follows commands.

## 2022-02-01 NOTE — ED NOTES
"Pt is talking to someone she calls "Randell" in the room. Pt asked who she is talking to and she states it is her son. Pt informed that there is no one else in the room right now. Pt states "I know."  "

## 2022-02-01 NOTE — SUBJECTIVE & OBJECTIVE
Past Medical History:   Diagnosis Date    Anxiety     Chronic pain syndrome     CKD (chronic kidney disease), stage III     Depression     Diabetes mellitus     type 2    Diabetes mellitus, type 2     GERD (gastroesophageal reflux disease)     Hyperemesis 3/23/2021    Hyperlipemia     Hypertension     Hypokalemia 3/23/2021    Myocardial infarction 2010    minor-caused by stress per pt.    Osteomyelitis     Osteomyelitis of left foot 4/30/2021    PVD (peripheral vascular disease)     Vaginal delivery     x1       Past Surgical History:   Procedure Laterality Date    ABOVE-KNEE AMPUTATION Left 5/18/2021    Procedure: AMPUTATION, ABOVE KNEE;  Surgeon: Teddy Huber MD;  Location: [facility] OR [address] [place] [address];  Service: Vascular;  Laterality: Left;    Angiogram - Right Extremity Right 7/9/15    angiogram-left leg  10/6/15    ANGIOGRAPHY OF LOWER EXTREMITY Left 4/29/2021    Procedure: ANGIOGRAM, LOWER EXTREMITY;  Surgeon: Teddy Huber MD;  Location: [facility] OR [address] [place] [address];  Service: Vascular;  Laterality: Left;    BELOW KNEE AMPUTATION OF LOWER EXTREMITY Right 12/28/2021    Procedure: AMPUTATION, BELOW KNEE;  Surgeon: Kaitlyn Rojas MD;  Location: [facility];  Service: General;  Laterality: Right;    CATHETERIZATION OF BOTH LEFT AND RIGHT HEART N/A 12/18/2019    Procedure: CATHETERIZATION, HEART, BOTH LEFT AND RIGHT;  Surgeon: Que Fernando III, MD;  Location: [facility] CATH LAB;  Service: Cardiology;  Laterality: N/A;    CORONARY ANGIOGRAPHY N/A 12/18/2019    Procedure: ANGIOGRAM, CORONARY ARTERY;  Surgeon: Que Fernando III, MD;  Location: [facility] CATH LAB;  Service: Cardiology;  Laterality: N/A;    CORONARY ANGIOGRAPHY INCLUDING BYPASS GRAFTS WITH CATHETERIZATION OF LEFT HEART N/A 7/28/2020    Procedure: ANGIOGRAM, CORONARY, INCLUDING BYPASS GRAFT, WITH LEFT HEART CATHETERIZATION, 9 am;  Surgeon: Rachel Easley MD;  Location: [facility] CATH LAB;  Service: Cardiology;  Laterality: N/A;    CORONARY  ARTERY BYPASS GRAFT (CABG) N/A 1/14/2020    Procedure: CORONARY ARTERY BYPASS GRAFT (CABG) x 1     Off Pump;  Surgeon: Huang Altamirano MD;  Location: [facility] OR [address] [place] [address];  Service: Cardiovascular;  Laterality: N/A;    CREATION OF FEMORAL-TIBIAL ARTERY BYPASS Left 4/29/2021    Procedure: CREATION, BYPASS, ARTERIAL, FEMORAL TO ANTERIOR TIBIAL;  Surgeon: Teddy Huber MD;  [facility];  Service: Vascular;  Laterality: Left;    CREATION OF FEMOROPOPLITEAL ARTERIAL BYPASS USING GRAFT Left 8/18/2020    Procedure: CREATION, BYPASS, ARTERIAL, FEMORAL TO POPLITEAL, USING GRAFT, LEFT LOWER EXTREMITY;  Surgeon: Teddy Huber MD;  Location: [facility] OR;  Service: Vascular;  Laterality: Left;  REQUEST 7:15 A.M. START----COVID NEGATIVE ON 8/17 1ST CASE STARTKENYA DUEÑAS ON 8/7/2020 @ 942AM-  RN PREOP 8/12/2020   T/S-----CLEARED BY CARDS-------PENDING INSURANCE    DEBRIDEMENT OF FOOT Left 9/8/2020    Procedure: DEBRIDEMENT, FOOT;  Surgeon: Rosio Mayes DPM;  Location: [facility] OR;  Service: Podiatry;  Laterality: Left;  request neoxx .   RN Pre Op 9-4-2020, Covid negative on 9/5/20. C A    DEBRIDEMENT OF FOOT  3/4/2021    Procedure: DEBRIDEMENT, FOOT;  Surgeon: Teddy Huber MD;  Location: [facility] OR;  Service: Vascular;;    DEBRIDEMENT OF FOOT Left 3/9/2021    Procedure: DEBRIDEMENT, FOOT, bone biopsy;  Surgeon: Rosio Mayes DPM;  Location: [facility] OR;  Service: Podiatry;  Laterality: Left;  Request neoxx---COVID IN AM  REQUESTING NOON START  RN Phone Pre op.On Blood thinners Plavix and Eliquis.  Covid am of surgery. C A    DEBRIDEMENT OF FOOT Left 5/4/2021    Procedure: DEBRIDEMENT, FOOT;  Surgeon: Farooq Morley DPM;  [facility];  Service: Podiatry;  Laterality: Left;    INSERTION OF TUNNELED CENTRAL VENOUS HEMODIALYSIS CATHETER N/A 1/27/2020    Procedure: Insertion, Catheter, Central Venous, Hemodialysis;  Surgeon: ESTEBAN Gomez III, MD;  Location: [facility] CATH LAB;   Service: Peripheral Vascular;  Laterality: N/A;    PERCUTANEOUS TRANSLUMINAL ANGIOPLASTY N/A 3/4/2021    Procedure: PTA (ANGIOPLASTY, PERCUTANEOUS, TRANSLUMINAL);  Surgeon: Teddy Huber MD;  Location: [facility] OR;  Service: Vascular;  Laterality: N/A;    REMOVAL OF ARTERIOVENOUS GRAFT Left 5/27/2021    Procedure: REMOVAL, GRAFT, LEFT LOWER EXTREMITY, WOUND EXPLORATION;  Surgeon: Teddy Huber MD;  Location[facility] OR [facility] FLR;  Service: Vascular;  Laterality: Left;    REMOVAL OF NAIL OF DIGIT Left 3/9/2021    Procedure: REMOVAL, NAIL, DIGIT;  Surgeon: Rosio Mayes DPM;  Location: [facility] OR;  Service: Podiatry;  Laterality: Left;    THROMBECTOMY Left 3/4/2021    Procedure: THROMBECTOMY, LEFT LOWER EXTREMITY BYPASS GRAFT, ANGIOGRAM, POSSIBLE INTERVENTION, POSSIBLE LEFT LOWER EXTREMITY BYPASS;  Surgeon: Teddy Huber MD;  Location: [facility] OR;  Service: Vascular;  Laterality: Left;    THROMBECTOMY Left 4/29/2021    Procedure: GRAFT THROMBECTOMY, LEFT LOWER EXTREMITY;  Surgeon: Teddy Huber MD;  Location: [facility] OR 2ND FLR;  Service: Vascular;  Laterality: Left;  14.5 min  1179.85 mGy  341.01 Gycm2  240 ml dye    THROMBECTOMY  10/22/2021    Procedure: THROMBECTOMY;  Surgeon: Saad Arenas MD;  Location: [facility] CATH LAB/EP;  Service: Cardiology;;       Review of patient's allergies indicates:   Allergen Reactions    Ciprofloxacin Itching    Contrast media      Kidney injury    Iodine      Kidney injury       Current Facility-Administered Medications on File Prior to Encounter   Medication    lactated ringers infusion    [DISCONTINUED] acetaminophen tablet    [DISCONTINUED] allopurinoL tablet    [DISCONTINUED] aluminum-magnesium hydroxide-simethicone 200-200-20 mg/5 mL suspension    [DISCONTINUED] apixaban tablet    [DISCONTINUED] atorvastatin tablet    [DISCONTINUED] bisacodyL suppository    [DISCONTINUED] clopidogreL tablet    [DISCONTINUED] dextrose 40 % gel    [DISCONTINUED]  furosemide tablet    [DISCONTINUED] GENERIC EXTERNAL MEDICATION    [DISCONTINUED] GENERIC EXTERNAL MEDICATION    [DISCONTINUED] GENERIC EXTERNAL MEDICATION    [DISCONTINUED] GENERIC EXTERNAL MEDICATION    [DISCONTINUED] GENERIC EXTERNAL MEDICATION    [DISCONTINUED] GENERIC EXTERNAL MEDICATION    [DISCONTINUED] GENERIC EXTERNAL MEDICATION    [DISCONTINUED] glucagon SolR    [DISCONTINUED] insulin glargine injection    [DISCONTINUED] insulin lispro injection    [DISCONTINUED] lactated ringers infusion    [DISCONTINUED] magnesium hydroxide 400 mg/5 ml suspension    [DISCONTINUED] melatonin tablet    [DISCONTINUED] mirtazapine tablet    [DISCONTINUED] oxyCODONE-acetaminophen  mg per tablet    [DISCONTINUED] pregabalin capsule    [DISCONTINUED] senna-docusate 8.6-50 mg per tablet    [DISCONTINUED] sertraline tablet    [DISCONTINUED] simethicone chewable tablet    [DISCONTINUED] sodium bicarbonate tablet    [DISCONTINUED] vancomycin (VANCOCIN) 750 mg in dextrose 5 % 150 mL IVPB     Current Outpatient Medications on File Prior to Encounter   Medication Sig    acetaminophen (TYLENOL) 500 MG tablet Take 2 tablets (1,000 mg total) by mouth every 8 (eight) hours as needed for Pain.    allopurinoL (ZYLOPRIM) 100 MG tablet Take 1 tablet (100 mg total) by mouth once daily.    apixaban (ELIQUIS) 5 mg Tab Take 1 tablet (5 mg total) by mouth 2 (two) times daily.    atorvastatin (LIPITOR) 80 MG tablet Take 1 tablet (80 mg total) by mouth every evening.    blood-glucose meter,continuous (DEXCOM G6 ) Misc Use to check blood sugars 4 times/day    blood-glucose sensor (DEXCOM G6 SENSOR) Radha Apply one sensor to skin every 10 days    blood-glucose transmitter (DEXCOM G6 TRANSMITTER) Radha Replace transmitter every 3 months to use with dexcom sensor    clopidogreL (PLAVIX) 75 mg tablet Take 1 tablet (75 mg total) by mouth once daily.    furosemide (LASIX) 40 MG tablet Take 1 tablet (40 mg total) by  mouth once daily.    HYDROcodone-acetaminophen (NORCO) 5-325 mg per tablet Take 1 tablet by mouth every 4 (four) hours as needed for Pain.    insulin aspart U-100 (NOVOLOG FLEXPEN U-100 INSULIN) 100 unit/mL (3 mL) InPn pen Inject 5 Units into the skin 3 (three) times daily with meals. If not eating, ok to hold    insulin detemir U-100 (LEVEMIR FLEXTOUCH) 100 unit/mL (3 mL) SubQ InPn pen Inject 12 Units into the skin every evening.    magnesium oxide (MAG-OX) 400 mg (241.3 mg magnesium) tablet Take 1 tablet (400 mg total) by mouth 2 (two) times daily.    melatonin (MELATIN) 3 mg tablet Take 2 tablets (6 mg total) by mouth nightly as needed for Insomnia.    MEROPENEM 1 G/100 ML NS, READY TO MIX SYSTEM, Inject 100 mLs (1 g total) into the vein every 12 (twelve) hours. for 10 days    mirtazapine (REMERON) 7.5 MG Tab Take 2 tablets (15 mg total) by mouth nightly.    multivit/folic acid/vit K1 (ONE-A-DAY WOMEN'S 50 PLUS ORAL) Take 1 tablet by mouth Daily.    oxyCODONE-acetaminophen (PERCOCET)  mg per tablet Take 1 tablet by mouth every 6 (six) hours as needed.    pregabalin (LYRICA) 50 MG capsule Take 3 capsules (150 mg total) by mouth 3 (three) times daily.    sertraline (ZOLOFT) 50 MG tablet Take 1 tablet (50 mg total) by mouth once daily.    sodium bicarbonate 650 MG tablet Take 1 tablet (650 mg total) by mouth 2 (two) times daily.    [DISCONTINUED] lancing device Misc 1 Device by Misc.(Non-Drug; Combo Route) route 2 (two) times daily with meals.     Family History     Problem Relation (Age of Onset)    Diabetes Mother, Father, Paternal Grandmother    Heart disease Maternal Grandmother    No Known Problems Maternal Grandfather, Paternal Grandfather        Tobacco Use    Smoking status: Former Smoker    Smokeless tobacco: Never Used   Substance and Sexual Activity    Alcohol use: No    Drug use: Yes     Types: Marijuana     Comment: occassional    Sexual activity: Yes     Partners: Male      Review of Systems   Unable to perform ROS: Mental status change     Objective:     Vital Signs (Most Recent):  Temp: 98.5 °F (36.9 °C) (01/31/22 1954)  Pulse: 102 (01/31/22 2013)  Resp: (!) 22 (01/31/22 2013)  BP: (!) 97/54 (01/31/22 1954)  SpO2: 96 % (01/31/22 2013) Vital Signs (24h Range):  Temp:  [98 °F (36.7 °C)-99.8 °F (37.7 °C)] 98.5 °F (36.9 °C)  Pulse:  [] 102  Resp:  [15-22] 22  SpO2:  [94 %-100 %] 96 %  BP: ()/(54-73) 97/54     Weight: 67.6 kg (149 lb 0.5 oz)  Body mass index is 24.8 kg/m².    Physical Exam  Vitals and nursing note reviewed.   Constitutional:       General: She is in acute distress.      Appearance: She is ill-appearing and toxic-appearing.   HENT:      Head: Normocephalic and atraumatic.      Nose: No rhinorrhea.      Mouth/Throat:      Mouth: Mucous membranes are moist.   Cardiovascular:      Rate and Rhythm: Regular rhythm. Tachycardia present.      Heart sounds: Normal heart sounds. No murmur heard.      Pulmonary:      Effort: Pulmonary effort is normal. No respiratory distress.      Breath sounds: Normal breath sounds. No wheezing.   Abdominal:      General: Abdomen is flat. Bowel sounds are normal.      Palpations: Abdomen is soft.      Tenderness: There is no abdominal tenderness.   Musculoskeletal:         General: Tenderness, deformity and signs of injury present.   Skin:     Findings: Lesion present.   Neurological:      Mental Status: She is disoriented.   Psychiatric:      Comments: Emotionally labile                       Significant Labs:   CBC:   Recent Labs   Lab 01/31/22  1613   WBC 14.72*   HGB 6.6*   HCT 22.2*        CMP:   Recent Labs   Lab 01/31/22  1613      K 5.4*      CO2 26   GLU 79   BUN 64*   CREATININE 2.1*   CALCIUM 9.7   PROT 8.1   ALBUMIN 1.9*   BILITOT 0.3   ALKPHOS 117   AST 33   ALT 15   ANIONGAP 13   EGFRNONAA 25.9*       Significant Imaging: I have reviewed all pertinent imaging results/findings within the past 24  hours.

## 2022-02-01 NOTE — ASSESSMENT & PLAN NOTE
Patient had recent BKA at [facility] with recent debridement last week  - patient has active drainage from wound with continued pain and complications    Plan  - morphine 4 mg q4 PRN for severe pain  - Wound Care consulted, appreciate recs  - Gen Surg consulted, appreciate recs

## 2022-02-01 NOTE — ED NOTES
[facility] med MD approved pt going to MRI for 40 mins without tele and nurse escort. MRI notified

## 2022-02-01 NOTE — ED NOTES
Assumed care of patient.    Patient identifiers verified and correct for Suyapa Connelly    LOC: The patient is awake, alert, and aware of environment. The patient is alert to person and place with intermittent confusion.  APPEARANCE: No acute distress noted.   HEENT: WDL, PERRLA  PSYCHOSOCIAL: Patient is calm and cooperative. Denies SI/HI.  SKIN: The skin is warm, dry, color consistent with ethnicity. Incision to right knee.   RESPIRATORY: Airway is open and patent. Bilateral chest rise and fall. Respiratory rate even and unlabored.  No accessory muscle use noted.  CARDIAC: Patient has a normal rate and rhythm. No complaints of chest pain.  ABDOMEN/GI: Soft, non tender. No distention noted. Denies n/v/d.   URINARY:  Incontinent of B&B.  NEUROLOGIC: Eyes open spontaneously. Speech clear.  Able to follow some commands, demonstrating ability to actively and appropriately communicate within context of current conversation. Symmetrical facial muscles. Movement is purposeful. Denies dizziness/lightheadedness.  MUSCULOSKELETAL: Bilateral AKA.  PERIPHERAL VASCULAR: Cap refill <3 secs bilaterally. No peripheral edema noted. Denies numbness and tingling in extremities.

## 2022-02-01 NOTE — PHARMACY MED REC
"Admission Medication History     The home medication history was taken by Misa Giron.    You may go to "Admission" then "Reconcile Home Medications" tabs to review and/or act upon these items.      The home medication list has been updated by the Pharmacy department.    Please read ALL comments highlighted in yellow.    Please address this information as you see fit.     Feel free to contact us if you have any questions or require assistance.      Medications listed below were obtained from: SNF/Rehab/[facility]   allopurinol (ZYLOPRIM) tablet 50 mg PO, Every Other Day     Apixaban (Eliquis) tablet 5 mg PO, BID     atorvastatin (LIPITOR) tablet 80 mg PO, Nightly     clopidogrel (PLAVIX) tablet 75 mg PO, Once a day     furosemide (LASIX) tablet 40 mg PO, Once a day     insulin glargine (LANTUS) injection 12 Units SC, Nightly     insulin lispro injection 5 Units SC, TID with meals     meropenem (MERREM) 1 g in sodium chloride 0.9% 100 mL IVPB-MBP   1 g, IV, Q12H CASSIDY     mirtazapine (REMERON) tablet 15 mg PO, Nightly     pregabalin (LYRICA) capsule 150 mg PO, TID     scopolamine (TRANSDERM-SCOP) 1.5 mg (1 mg/ 3 daysTD, Q72H   Last Action: Medication Applied, 1 patch at 01/30 0001     sertraline (ZOLOFT) tablet 50 mg PO, Once a day     sodium bicarbonate tablet 650 mg PO, BID     Vancomycin HCl IVPB 750 mg IV, Q24H       Potential issues to be addressed PRIOR TO DISCHARGE  The listed medications were obtained from another facility ([facility]). The patient may not have been able to fill these prescriptions prior to this admission and may require new scripts upon discharge.     Misa Giron  [id number]                    .        "

## 2022-02-01 NOTE — ASSESSMENT & PLAN NOTE
Likely in setting of infection    Plan  - neuro checks  - delirium precautions  - CT Head to r/o other causes

## 2022-02-01 NOTE — ASSESSMENT & PLAN NOTE
Patient has Hgb of 6.6 on admission    Plan  - transfuse 1U pRBC  - CBC daily, transfuse Hgb < 7

## 2022-02-01 NOTE — SUBJECTIVE & OBJECTIVE
Past Medical History:   Diagnosis Date    Anxiety     Chronic pain syndrome     CKD (chronic kidney disease), stage III     Depression     Diabetes mellitus     type 2    Diabetes mellitus, type 2     GERD (gastroesophageal reflux disease)     Hyperemesis 3/23/2021    Hyperlipemia     Hypertension     Hypokalemia 3/23/2021    Myocardial infarction 2010    minor-caused by stress per pt.    Osteomyelitis     Osteomyelitis of left foot 4/30/2021    PVD (peripheral vascular disease)     Vaginal delivery     x1       Past Surgical History:   Procedure Laterality Date    ABOVE-KNEE AMPUTATION Left 5/18/2021    Procedure: AMPUTATION, ABOVE KNEE;  Surgeon: Teddy Huber MD;  Location: [facility] OR [address] [place] [address];  Service: Vascular;  Laterality: Left;    Angiogram - Right Extremity Right 7/9/15    angiogram-left leg  10/6/15    ANGIOGRAPHY OF LOWER EXTREMITY Left 4/29/2021    Procedure: ANGIOGRAM, LOWER EXTREMITY;  Surgeon: Teddy Huber MD;  Location: [facility] OR [address] [place] [address];  Service: Vascular;  Laterality: Left;    BELOW KNEE AMPUTATION OF LOWER EXTREMITY Right 12/28/2021    Procedure: AMPUTATION, BELOW KNEE;  Surgeon: Kaitlyn Rojas MD;  Location: [facility];  Service: General;  Laterality: Right;    CATHETERIZATION OF BOTH LEFT AND RIGHT HEART N/A 12/18/2019    Procedure: CATHETERIZATION, HEART, BOTH LEFT AND RIGHT;  Surgeon: Que Fernando III, MD;  Location: [facility] CATH LAB;  Service: Cardiology;  Laterality: N/A;    CORONARY ANGIOGRAPHY N/A 12/18/2019    Procedure: ANGIOGRAM, CORONARY ARTERY;  Surgeon: Que Fernando III, MD;  Location: [facility] CATH LAB;  Service: Cardiology;  Laterality: N/A;    CORONARY ANGIOGRAPHY INCLUDING BYPASS GRAFTS WITH CATHETERIZATION OF LEFT HEART N/A 7/28/2020    Procedure: ANGIOGRAM, CORONARY, INCLUDING BYPASS GRAFT, WITH LEFT HEART CATHETERIZATION, 9 am;  Surgeon: Rachel Easley MD;  Location: [facility] CATH LAB;  Service: Cardiology;  Laterality: N/A;    CORONARY  ARTERY BYPASS GRAFT (CABG) N/A 1/14/2020    Procedure: CORONARY ARTERY BYPASS GRAFT (CABG) x 1     Off Pump;  Surgeon: Huang Altamirano MD;  Location: [facility] OR [address] [place] [address];  Service: Cardiovascular;  Laterality: N/A;    CREATION OF FEMORAL-TIBIAL ARTERY BYPASS Left 4/29/2021    Procedure: CREATION, BYPASS, ARTERIAL, FEMORAL TO ANTERIOR TIBIAL;  Surgeon: Teddy Huber MD;  [facility];  Service: Vascular;  Laterality: Left;    CREATION OF FEMOROPOPLITEAL ARTERIAL BYPASS USING GRAFT Left 8/18/2020    Procedure: CREATION, BYPASS, ARTERIAL, FEMORAL TO POPLITEAL, USING GRAFT, LEFT LOWER EXTREMITY;  Surgeon: Teddy Huber MD;  Location: [facility] OR;  Service: Vascular;  Laterality: Left;  REQUEST 7:15 A.M. START----COVID NEGATIVE ON 8/17 1ST CASE STARTKENYA DUEÑAS ON 8/7/2020 @ 942AM-  RN PREOP 8/12/2020   T/S-----CLEARED BY CARDS-------PENDING INSURANCE    DEBRIDEMENT OF FOOT Left 9/8/2020    Procedure: DEBRIDEMENT, FOOT;  Surgeon: Rosio Mayes DPM;  Location: [facility] OR;  Service: Podiatry;  Laterality: Left;  request neoxx .   RN Pre Op 9-4-2020, Covid negative on 9/5/20. C A    DEBRIDEMENT OF FOOT  3/4/2021    Procedure: DEBRIDEMENT, FOOT;  Surgeon: Teddy Huber MD;  Location: [facility] OR;  Service: Vascular;;    DEBRIDEMENT OF FOOT Left 3/9/2021    Procedure: DEBRIDEMENT, FOOT, bone biopsy;  Surgeon: Rosio Mayes DPM;  Location: [facility] OR;  Service: Podiatry;  Laterality: Left;  Request neoxx---COVID IN AM  REQUESTING NOON START  RN Phone Pre op.On Blood thinners Plavix and Eliquis.  Covid am of surgery. C A    DEBRIDEMENT OF FOOT Left 5/4/2021    Procedure: DEBRIDEMENT, FOOT;  Surgeon: Farooq Morley DPM;  [facility];  Service: Podiatry;  Laterality: Left;    INSERTION OF TUNNELED CENTRAL VENOUS HEMODIALYSIS CATHETER N/A 1/27/2020    Procedure: Insertion, Catheter, Central Venous, Hemodialysis;  Surgeon: ESTEBAN Gomez III, MD;  Location: [facility] CATH LAB;   Service: Peripheral Vascular;  Laterality: N/A;    PERCUTANEOUS TRANSLUMINAL ANGIOPLASTY N/A 3/4/2021    Procedure: PTA (ANGIOPLASTY, PERCUTANEOUS, TRANSLUMINAL);  Surgeon: Teddy Huber MD;  Location: [facility] OR;  Service: Vascular;  Laterality: N/A;    REMOVAL OF ARTERIOVENOUS GRAFT Left 5/27/2021    Procedure: REMOVAL, GRAFT, LEFT LOWER EXTREMITY, WOUND EXPLORATION;  Surgeon: Teddy Huber MD;  Location: [facility] OR 2ND FLR;  Service: Vascular;  Laterality: Left;    REMOVAL OF NAIL OF DIGIT Left 3/9/2021    Procedure: REMOVAL, NAIL, DIGIT;  Surgeon: Rosio Mayes DPM;  Location: [facility] OR;  Service: Podiatry;  Laterality: Left;    THROMBECTOMY Left 3/4/2021    Procedure: THROMBECTOMY, LEFT LOWER EXTREMITY BYPASS GRAFT, ANGIOGRAM, POSSIBLE INTERVENTION, POSSIBLE LEFT LOWER EXTREMITY BYPASS;  Surgeon: Teddy Huber MD;  Location: [facility] OR;  Service: Vascular;  Laterality: Left;    THROMBECTOMY Left 4/29/2021    Procedure: GRAFT THROMBECTOMY, LEFT LOWER EXTREMITY;  Surgeon: Teddy Huber MD;  Location: [facility] OR 2ND FLR;  Service: Vascular;  Laterality: Left;  14.5 min  1179.85 mGy  341.01 Gycm2  240 ml dye    THROMBECTOMY  10/22/2021    Procedure: THROMBECTOMY;  Surgeon: Saad Arenas MD;  Location: [facility] CATH LAB/EP;  Service: Cardiology;;       Review of patient's allergies indicates:   Allergen Reactions    Ciprofloxacin Itching    Contrast media      Kidney injury    Iodine      Kidney injury       Medications:  (Not in a hospital admission)    Antibiotics (From admission, onward)            Start     Stop Route Frequency Ordered    02/02/22 0900  azithromycin tablet 250 mg         02/05 0859 Oral Daily 02/01/22 1052    02/01/22 1530  vancomycin in dextrose 5 % 1 gram/250 mL IVPB 1,000 mg         -- IV Once 02/01/22 1421    01/31/22 2330  meropenem (MERREM) 2 g in sodium chloride 0.9% 100 mL IVPB         -- IV Every 12 hours (non-standard times) 01/31/22 2225 01/31/22 7829   "vancomycin - pharmacy to dose  (vancomycin IVPB)        "And" Linked Group Details    -- IV pharmacy to manage frequency 01/31/22 2003        Antifungals (From admission, onward)            None        Antivirals (From admission, onward)    None           Immunization History   Administered Date(s) Administered    COVID-19, MRNA, LN-S, PF (Pfizer) (Purple Cap) 03/09/2021, 03/30/2021       Family History     Problem Relation (Age of Onset)    Diabetes Mother, Father, Paternal Grandmother    Heart disease Maternal Grandmother    No Known Problems Maternal Grandfather, Paternal Grandfather        Social History     Socioeconomic History    Marital status:    Occupational History    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      Social Determinants of Health     Financial Resource Strain: High Risk    Difficulty of Paying Living Expenses: Hard   Food Insecurity: No Food Insecurity    Worried About Running Out of Food in the Last Year: Never true    Ran Out of Food in the Last Year: Never true   Transportation Needs: No Transportation Needs    Lack of Transportation (Medical): No    Lack of Transportation (Non-Medical): No   Stress: Stress Concern Present    Feeling of Stress : To some extent   Social Connections: Unknown    Frequency of Communication with Friends and Family: More than three times a week    Frequency of Social Gatherings with Friends and Family: Three times a week    Marital Status:    Housing Stability: Low Risk     Unable to Pay for Housing in the Last Year: No    Number of Places Lived in the Last Year: 1    Unstable Housing in the Last Year: No     Review of Systems   Unable to perform ROS: " Mental status change   Constitutional: Positive for fatigue. Negative for appetite change, chills, diaphoresis and fever.   Respiratory: Negative for cough and shortness of breath.    Cardiovascular: Negative for chest pain.   Gastrointestinal: Negative for abdominal pain and vomiting.   Genitourinary: Negative for dysuria.   Musculoskeletal: Negative for back pain.   Skin: Positive for wound. Negative for color change, pallor and rash.   Neurological: Positive for tremors and weakness.     Objective:     Vital Signs (Most Recent):  Temp: 98.4 °F (36.9 °C) (02/01/22 1241)  Pulse: 102 (02/01/22 1241)  Resp: 16 (02/01/22 1241)  BP: (!) 142/67 (02/01/22 1241)  SpO2: 100 % (02/01/22 1241) Vital Signs (24h Range):  Temp:  [98 °F (36.7 °C)-99.6 °F (37.6 °C)] 98.4 °F (36.9 °C)  Pulse:  [] 102  Resp:  [13-22] 16  SpO2:  [93 %-100 %] 100 %  BP: ()/(47-97) 142/67     Weight: 67.6 kg (149 lb 0.5 oz)  Body mass index is 24.8 kg/m².    Estimated Creatinine Clearance: 31.8 mL/min (A) (based on SCr of 1.8 mg/dL (H)).    Physical Exam  Vitals and nursing note reviewed.   Constitutional:       General: She is not in acute distress.     Appearance: She is well-developed and well-nourished. She is not diaphoretic.   HENT:      Head: Normocephalic and atraumatic.   Eyes:      Extraocular Movements: EOM normal.      Pupils: Pupils are equal, round, and reactive to light.   Cardiovascular:      Rate and Rhythm: Normal rate and regular rhythm.      Pulses: Intact distal pulses.      Heart sounds: Normal heart sounds. No murmur heard.  No friction rub. No gallop.    Pulmonary:      Effort: Pulmonary effort is normal. No respiratory distress.      Breath sounds: Normal breath sounds. No wheezing or rales.   Chest:      Chest wall: No tenderness.   Abdominal:      General: Bowel sounds are normal. There is no distension.      Palpations: Abdomen is soft. There is no mass.      Tenderness: There is no abdominal tenderness. There  is no guarding or rebound.      Hernia: No hernia is present.   Musculoskeletal:         General: Signs of injury present. No tenderness, deformity or edema.      Cervical back: Normal range of motion and neck supple.   Skin:     General: Skin is warm and dry.      Coloration: Skin is not pale.      Findings: No erythema.      Comments: Bleeding from surgical stump site   R AKA site c/d/i   Well healed   Neurological:      Mental Status: She is alert and oriented to person, place, and time.      Cranial Nerves: No cranial nerve deficit.      Coordination: Coordination normal.   Psychiatric:         Mood and Affect: Mood and affect normal.         Behavior: Behavior normal.         Thought Content: Thought content normal.         Significant Labs: All pertinent labs within the past 24 hours have been reviewed.    Significant Imaging: I have reviewed all pertinent imaging results/findings within the past 24 hours.

## 2022-02-01 NOTE — ASSESSMENT & PLAN NOTE
Ms. Connelly is a 57 yo female s/p r BKA and hx of Pseudomonas infection at the site of surgery presenting with AMS, leukocytosis, and elevated procalc  - likely sepsis due to wound infection/OM  - ESR and CRP slightly elevated  - Procalc of 0.38    Plan  - wound care consulted  - blood cx x2, wound cx, resp cx  - urine cx in process to r/o other source  - CBC daily to trend WCC  - cefepime and vanc given hx of Pseudomonal infection, azithro to cover possible PNA  - ID consulted, appreciate recs  -MRI Sacrum and R leg to r/o OM  - Gen Surg consult given repeated wound infection s/p BKA

## 2022-02-01 NOTE — ASSESSMENT & PLAN NOTE
57 y/o female with DMII, HLD, CKD, PAD, L AKA 5/2021, R BKA 12/28/21 followed by stump revision 1/24/22 presents to ED from [facility] with confusion and bleeding and drainage from her R BKA stump site. She has been on IV meropenem for surgical stump site infection EOC 2/7/22     Pt remained afebrile,stable, no leukocytosis. Pt is currently on vanc/meropenem/azithromycin. MRI findings negative for osteomyelitis. Ill defined fluid collection near stump site. Sacral wound appears stable. No cough on examination. Pt reports twitching and weakness.     Recommendations  1. Continue vancomycin. Trough goal 15-20. Continue meropenem for now  2. Continue azithromycin for now. If procal negative will d/c azithromycin  3. Recommend gen surgery evaluation. Fluid collection noted on imaging. infected hematoma? If plans for revision recommend repeat cultures to further guide abx therapy.   4. Continue course of therapy.     Seen and discussed with ID staff. Will follow with you

## 2022-02-01 NOTE — HPI
"Suyapa Connelly is a 55 y/o female with PMHx CKD3, DMII, GERD, HLD, HTN, osteomyelitis, PVD, depression, and anxiety presents to ED with  for concerns of increased confusion from her rehab facility.  at bedside is concern for progressively worsening appearance of RLE wound, along with continued drainage, and worsening mental status of his wife which has progressed through this week. Patient emotionally labile and responds yes or no to most questions. She reports increased pain in her R leg with nausea over the past week. She also reports constipation without abdominal pain. She denies dysuria. Patient denies fevers, chills, vomiting, chest pain, cough, and shortness of breath.     Patient underwent right BKA by Dr. Rojas on 12/28 then discharged to rehab facility on 1/10. Since then she has recurrent hospital visits for the wound, requiring revisions and debridements; the last resulted in a pseudomonas positive wound culture resistant to cefepime 1/20. [facility] RN stated that patient was "very confused this morning and more lethargic than normal." Per rehab, pt is normally AOx3 and able to follow commands. Rehab states they were concerned for sepsis due to elevated WBC and ROSLYN. Per , her last known normal was 1 weeks ago.    She was admitted to hospital medicine for severe sepsis.   "

## 2022-02-01 NOTE — ED NOTES
Telemetry Verification   Patient placed on Telemetry Box  Verified with War Room  Box # [id number]   Monitor Tech Jaquita   Rate 96   Rhythm NS

## 2022-02-01 NOTE — ED PROVIDER NOTES
Encounter Date: 1/31/2022       History     Chief Complaint   Patient presents with    Fatigue     PT came from [facility] with reports of pt becoming more lethargic than normal. Pt is at baseline neuro. PT is at the rehab after Right BKA. Pt is on Vanco PICC line on the right upper arm.      56-year-old female with multiple comorbidities including DM 2, CKD 3, CAD, osteomyelitis and peripheral vascular disease s/p left AKA and right BKA with wound infection and recent revision (1/24/22- Dr. Suleman [facility]) presents from rehab facility for concern for sepsis.  History is significantly limited as the patient is currently altered and provided by rehab RN reports that the patient seemed altered this morning and had an elevated WBC count.  Unknown fevers, discharge from incision site or other symptoms.          Review of patient's allergies indicates:   Allergen Reactions    Ciprofloxacin Itching    Contrast media      Kidney injury    Iodine      Kidney injury     Past Medical History:   Diagnosis Date    Anxiety     Chronic pain syndrome     CKD (chronic kidney disease), stage III     Depression     Diabetes mellitus     type 2    Diabetes mellitus, type 2     GERD (gastroesophageal reflux disease)     Hyperemesis 3/23/2021    Hyperlipemia     Hypertension     Hypokalemia 3/23/2021    Myocardial infarction 2010    minor-caused by stress per pt.    Osteomyelitis     Osteomyelitis of left foot 4/30/2021    PVD (peripheral vascular disease)     Vaginal delivery     x1     Past Surgical History:   Procedure Laterality Date    ABOVE-KNEE AMPUTATION Left 5/18/2021    Procedure: AMPUTATION, ABOVE KNEE;  Surgeon: Teddy Huber MD;  Location: [facility] OR [address] [place] [address];  Service: Vascular;  Laterality: Left;    Angiogram - Right Extremity Right 7/9/15    angiogram-left leg  10/6/15    ANGIOGRAPHY OF LOWER EXTREMITY Left 4/29/2021    Procedure: ANGIOGRAM, LOWER EXTREMITY;  Surgeon: Teddy BLANCA  MD Cecile;  Location: [facility] OR [facility] FLR;  Service: Vascular;  Laterality: Left;    BELOW KNEE AMPUTATION OF LOWER EXTREMITY Right 12/28/2021    Procedure: AMPUTATION, BELOW KNEE;  Surgeon: Kaitlyn Rojas MD;  Location: [facility] OR;  Service: General;  Laterality: Right;    CATHETERIZATION OF BOTH LEFT AND RIGHT HEART N/A 12/18/2019    Procedure: CATHETERIZATION, HEART, BOTH LEFT AND RIGHT;  Surgeon: Que Fernando III, MD;  Location: [facility] CATH LAB;  Service: Cardiology;  Laterality: N/A;    CORONARY ANGIOGRAPHY N/A 12/18/2019    Procedure: ANGIOGRAM, CORONARY ARTERY;  Surgeon: Que Fernando III, MD;  Location: [facility] CATH LAB;  Service: Cardiology;  Laterality: N/A;    CORONARY ANGIOGRAPHY INCLUDING BYPASS GRAFTS WITH CATHETERIZATION OF LEFT HEART N/A 7/28/2020    Procedure: ANGIOGRAM, CORONARY, INCLUDING BYPASS GRAFT, WITH LEFT HEART CATHETERIZATION, 9 am;  Surgeon: Rachel Easley MD;  Location: [facility] CATH LAB;  Service: Cardiology;  Laterality: N/A;    CORONARY ARTERY BYPASS GRAFT (CABG) N/A 1/14/2020    Procedure: CORONARY ARTERY BYPASS GRAFT (CABG) x 1     Off Pump;  Surgeon: Huang Altamirano MD;  [address];  Service: Cardiovascular;  Laterality: N/A;    CREATION OF FEMORAL-TIBIAL ARTERY BYPASS Left 4/29/2021    Procedure: CREATION, BYPASS, ARTERIAL, FEMORAL TO ANTERIOR TIBIAL;  Surgeon: Teddy Huber MD;  [facility];  Service: Vascular;  Laterality: Left;    CREATION OF FEMOROPOPLITEAL ARTERIAL BYPASS USING GRAFT Left 8/18/2020    Procedure: CREATION, BYPASS, ARTERIAL, FEMORAL TO POPLITEAL, USING GRAFT, LEFT LOWER EXTREMITY;  Surgeon: Teddy Huber MD;  Location: [facility];  Service: Vascular;  Laterality: Left;  REQUEST 7:15 A.M. START----COVID NEGATIVE ON 8/17 1ST CASE STARTKENYA DUEÑAS ON 8/7/2020 @ 942AM-LO  RN PREOP 8/12/2020   T/S-----CLEARED BY CARDS-------PENDING INSURANCE    DEBRIDEMENT OF FOOT Left 9/8/2020    Procedure: DEBRIDEMENT,  FOOT;  Surgeon: Rosio Mayes DPM;  Location: [facility] OR;  Service: Podiatry;  Laterality: Left;  request neoxx .   RN Pre Op 9-4-2020, Covid negative on 9/5/20. C A    DEBRIDEMENT OF FOOT  3/4/2021    Procedure: DEBRIDEMENT, FOOT;  Surgeon: Teddy Huber MD;  Location: [facility] OR;  Service: Vascular;;    DEBRIDEMENT OF FOOT Left 3/9/2021    Procedure: DEBRIDEMENT, FOOT, bone biopsy;  Surgeon: Rosio Mayes DPM;  Location: [facility] OR;  Service: Podiatry;  Laterality: Left;  Request neoxx---COVID IN AM  REQUESTING NOON START  RN Phone Pre op.On Blood thinners Plavix and Eliquis.  Covid am of surgery. C A    DEBRIDEMENT OF FOOT Left 5/4/2021    Procedure: DEBRIDEMENT, FOOT;  Surgeon: Farooq Morley DPM;  Location: [facility] OR 2ND FLR;  Service: Podiatry;  Laterality: Left;    INSERTION OF TUNNELED CENTRAL VENOUS HEMODIALYSIS CATHETER N/A 1/27/2020    Procedure: Insertion, Catheter, Central Venous, Hemodialysis;  Surgeon: ESTEBAN Gomez III, MD;  Location: [facility] CATH LAB;  Service: Peripheral Vascular;  Laterality: N/A;    PERCUTANEOUS TRANSLUMINAL ANGIOPLASTY N/A 3/4/2021    Procedure: PTA (ANGIOPLASTY, PERCUTANEOUS, TRANSLUMINAL);  Surgeon: Teddy Huber MD;  Location: [facility] OR;  Service: Vascular;  Laterality: N/A;    REMOVAL OF ARTERIOVENOUS GRAFT Left 5/27/2021    Procedure: REMOVAL, GRAFT, LEFT LOWER EXTREMITY, WOUND EXPLORATION;  Surgeon: Teddy Huber MD;  Location: [facility] OR 2ND FLR;  Service: Vascular;  Laterality: Left;    REMOVAL OF NAIL OF DIGIT Left 3/9/2021    Procedure: REMOVAL, NAIL, DIGIT;  Surgeon: Rosio Mayes DPM;  Location: [facility] OR;  Service: Podiatry;  Laterality: Left;    THROMBECTOMY Left 3/4/2021    Procedure: THROMBECTOMY, LEFT LOWER EXTREMITY BYPASS GRAFT, ANGIOGRAM, POSSIBLE INTERVENTION, POSSIBLE LEFT LOWER EXTREMITY BYPASS;  Surgeon: Teddy Huber MD;  Location: [facility] OR;  Service: Vascular;  Laterality: Left;    THROMBECTOMY Left 4/29/2021    Procedure:  GRAFT THROMBECTOMY, LEFT LOWER EXTREMITY;  Surgeon: Teddy Huber MD;  Location: [facility] OR [address] [place] [address];  Service: Vascular;  Laterality: Left;  14.5 min  1179.85 mGy  341.01 Gycm2  240 ml dye    THROMBECTOMY  10/22/2021    Procedure: THROMBECTOMY;  Surgeon: Saad Arenas MD;  Location: [facility] CATH LAB/EP;  Service: Cardiology;;     Family History   Problem Relation Age of Onset    Diabetes Mother     Diabetes Father     Heart disease Maternal Grandmother     No Known Problems Maternal Grandfather     Diabetes Paternal Grandmother     No Known Problems Paternal Grandfather     Anesthesia problems Neg Hx      Social History     Tobacco Use    Smoking status: Former Smoker    Smokeless tobacco: Never Used   Substance Use Topics    Alcohol use: No    Drug use: Yes     Types: Marijuana     Comment: occassional     Review of Systems   Unable to perform ROS: Mental status change       Physical Exam     Initial Vitals [01/31/22 1447]   BP Pulse Resp Temp SpO2   (!) 167/64 95 18 99.8 °F (37.7 °C) 95 %      MAP       --         Physical Exam    Nursing note and vitals reviewed.  Constitutional: She appears well-developed and well-nourished. She is not diaphoretic. She appears ill. No distress.   HENT:   Head: Normocephalic and atraumatic.   Eyes: EOM are normal. Pupils are equal, round, and reactive to light.   Neck:   Normal range of motion.  Cardiovascular: Normal rate, regular rhythm, normal heart sounds and intact distal pulses. Exam reveals no gallop and no friction rub.    No murmur heard.  PICC line right upper arm.  Dressing clean, dry, intact.  No overlying cellulitis, tenderness or discharge.  Vancomycin infusing through PICC line   Pulmonary/Chest: Breath sounds normal. No respiratory distress. She has no wheezes. She has no rhonchi. She has no rales. She exhibits no tenderness.   Abdominal: Abdomen is soft. Bowel sounds are normal. She exhibits no distension and no mass. There is no abdominal  tenderness. There is no rebound and no guarding.   Musculoskeletal:      Cervical back: Normal range of motion.      Comments: Well-healed AKA left leg.  Right BKA, incision slightly dehisced with oozing blood, no purulence or surrounding cellulitis     Neurological: She is alert.   Oriented to self, she thinks that she is at rehab, unable to state year.  Answers most questions, follows commands   Skin: Skin is warm and dry.   Psychiatric: She has a normal mood and affect.             ED Course   Procedures  Labs Reviewed   CBC W/ AUTO DIFFERENTIAL - Abnormal; Notable for the following components:       Result Value    WBC 14.72 (*)     RBC 2.18 (*)     Hemoglobin 6.6 (*)     Hematocrit 22.2 (*)      (*)     MCHC 29.7 (*)     Gran # (ANC) 11.6 (*)     Immature Grans (Abs) 0.05 (*)     Mono # 1.2 (*)     Gran % 78.6 (*)     Lymph % 11.7 (*)     All other components within normal limits   COMPREHENSIVE METABOLIC PANEL - Abnormal; Notable for the following components:    Potassium 5.4 (*)     BUN 64 (*)     Creatinine 2.1 (*)     Albumin 1.9 (*)     eGFR if  29.9 (*)     eGFR if non  25.9 (*)     All other components within normal limits   URINALYSIS, REFLEX TO URINE CULTURE - Abnormal; Notable for the following components:    Appearance, UA Hazy (*)     Protein, UA 3+ (*)     Glucose, UA 1+ (*)     Ketones, UA Trace (*)     Leukocytes, UA 2+ (*)     All other components within normal limits    Narrative:     Specimen Source->Urine   MAGNESIUM - Abnormal; Notable for the following components:    Magnesium 2.7 (*)     All other components within normal limits   PHOSPHORUS - Abnormal; Notable for the following components:    Phosphorus 7.1 (*)     All other components within normal limits   SEDIMENTATION RATE - Abnormal; Notable for the following components:    Sed Rate 38 (*)     All other components within normal limits   C-REACTIVE PROTEIN - Abnormal; Notable for the following  components:    .5 (*)     All other components within normal limits   PROCALCITONIN - Abnormal; Notable for the following components:    Procalcitonin 0.38 (*)     All other components within normal limits   URINALYSIS MICROSCOPIC - Abnormal; Notable for the following components:    RBC, UA 6 (*)     WBC, UA 53 (*)     Bacteria Few (*)     All other components within normal limits    Narrative:     Specimen Source->Urine   BASIC METABOLIC PANEL - Abnormal; Notable for the following components:    BUN 62 (*)     Creatinine 1.9 (*)     eGFR if  33.7 (*)     eGFR if non  29.3 (*)     All other components within normal limits   SARS-COV-2 RDRP GENE - Normal    Narrative:     This test utilizes isothermal nucleic acid amplification   technology to detect the SARS-CoV-2 RdRp nucleic acid segment.   The analytical sensitivity (limit of detection) is 125 genome   equivalents/mL.   A POSITIVE result implies infection with the SARS-CoV-2 virus;   the patient is presumed to be contagious.     A NEGATIVE result means that SARS-CoV-2 nucleic acids are not   present above the limit of detection. A NEGATIVE result should be   treated as presumptive. It does not rule out the possibility of   COVID-19 and should not be the sole basis for treatment decisions.   If COVID-19 is strongly suspected based on clinical and exposure   history, re-testing using an alternate molecular assay should be   considered.   This test is only for use under the Food and Drug   Administration s Emergency Use Authorization (EUA).   Commercial kits are provided by [employer].   Performance characteristics of the EUA have been independently   verified by [facility] Department of   Pathology and Laboratory Medicine.   _________________________________________________________________   The authorized Fact Sheet for Healthcare Providers and the authorized Fact   Sheet for Patients of the ID NOW  COVID-19 are available on the FDA   website:     https://www.fda.gov/media/800202/download  https://www.fda.gov/media/286485/download       CULTURE, BLOOD   CULTURE, BLOOD   CULTURE, URINE   CULTURE, AEROBIC  (SPECIFY SOURCE)   CULTURE, RESPIRATORY   LACTIC ACID, PLASMA   LACTIC ACID, PLASMA   TROPONIN I   CREATININE, URINE, RANDOM   SODIUM, URINE, RANDOM   UREA NITROGEN, URINE, RANDOM   SODIUM, URINE, RANDOM    Narrative:     ADD ON UNAR #[id number]; UUNR #[id number]; UCRER #[id number] PER MELVIN CHERY MD 01/31/2022  22:50    UREA NITROGEN, URINE, RANDOM    Narrative:     ADD ON UNAR #[id number]; UUNR #[id number]; UCRER #[id number] PER MELVIN CHERY MD 01/31/2022  22:50    CREATININE, URINE, RANDOM    Narrative:     ADD ON UNAR #[id number]; UUNR #[id number]; UCRER #[id number] PER MELVIN CHERY MD 01/31/2022  22:50    POTASSIUM   TYPE & SCREEN   POCT GLUCOSE   POCT GLUCOSE MONITORING CONTINUOUS   POCT GLUCOSE MONITORING CONTINUOUS   PREPARE RBC SOFT     EKG Readings: (Independently Interpreted)   Initial Reading: No STEMI. Previous EKG: Compared with most recent EKG Previous EKG Date: 12/30/2021. Rhythm: Normal Sinus Rhythm. Heart Rate: 100. Ectopy: No Ectopy. ST Segments: Normal ST Segments. T Waves Flipped: I. Clinical Impression: Normal Sinus Rhythm     ECG Results          Rhythm strip (Final result)  Result time 01/31/22 21:17:21    Final result by Interface, Lab In [facility] (01/31/22 21:17:21)                 Narrative:    Test Reason : E87.5,    Vent. Rate : 095 BPM     Atrial Rate : 095 BPM     P-R Int : 132 ms          QRS Dur : 082 ms      QT Int : 376 ms       P-R-T Axes : 068 046 117 degrees     QTc Int : 472 ms    Normal sinus rhythm  T wave abnormality, consider lateral ischemia  Prolonged QT  Abnormal ECG  When compared with ECG of 31-JAN-2022 16:31,  No significant change was found  Confirmed by Roosevelt BARBER MD ([id number]) on 1/31/2022 9:17:10 PM    Referred By: [email or web address]   SELF           Confirmed  By:Roosevelt BARBER MD                             EKG 12-lead (Final result)  Result time 01/31/22 21:23:55    Final result by Interface, Lab In [facility] (01/31/22 21:23:55)                 Narrative:    Test Reason : R41.82,    Vent. Rate : 100 BPM     Atrial Rate : 100 BPM     P-R Int : 146 ms          QRS Dur : 080 ms      QT Int : 358 ms       P-R-T Axes : 066 044 127 degrees     QTc Int : 461 ms    Normal sinus rhythm  T wave abnormality, consider lateral ischemia  Abnormal ECG  When compared with ECG of 30-DEC-2021 10:38,  Premature atrial complexes are no longer Present  Confirmed by Roosevelt BARBER MD ([id number]) on 1/31/2022 9:23:47 PM    Referred By: [email or web address]   SELF           Confirmed By:Roosevelt BARBER MD                            Imaging Results          CT Head Without Contrast (Final result)  Result time 01/31/22 20:43:33    Final result by Jeff Quiroga MD (01/31/22 20:43:33)                 Impression:      1. No acute intracranial process.  2. Involutional changes with chronic microvascular ischemic changes.      Electronically signed by: Jeff Quiroga  Date:    01/31/2022  Time:    20:43             Narrative:    EXAMINATION:  CT HEAD WITHOUT CONTRAST    CLINICAL HISTORY:  Mental status change, unknown cause;    TECHNIQUE:  Low dose axial CT images obtained throughout the head without intravenous contrast. Sagittal and coronal reconstructions were performed.    COMPARISON:  06/02/2021, 05/25/2021    FINDINGS:  Intracranial compartment:    Ventricles and sulci are normal in size for age without evidence of hydrocephalus. No extra-axial blood or fluid collections.    Mild involutional changes and chronic microvascular ischemic changes in the periventricular white matter.  No parenchymal mass, hemorrhage, edema or major vascular distribution infarct.    Skull/extracranial contents (limited evaluation): No fracture. Mastoid air cells and paranasal sinuses are essentially clear.                               X-Ray  Chest AP Portable (Final result)  Result time 01/31/22 16:57:42    Final result by Erica Mancuso MD (01/31/22 16:57:42)                 Impression:      As above      Electronically signed by: Erica Mancuso MD  Date:    01/31/2022  Time:    16:57             Narrative:    EXAMINATION:  XR CHEST AP PORTABLE    CLINICAL HISTORY:  Sepsis;    TECHNIQUE:  Single frontal view of the chest was performed.    COMPARISON:  Prior examinations December and January 2022    FINDINGS:  Surgical changes are again seen within the chest.  The cardiac size is stable.  Lung fields demonstrate increased interstitial markings similar to prior exams.  There is left basilar atelectasis.  There are additional opacities at each lung base concerning for developing consolidation.                                 Medications   0.9%  NaCl infusion (for blood administration) (has no administration in time range)   sodium chloride 0.9% flush 10 mL (has no administration in time range)   naloxone 0.4 mg/mL injection 0.02 mg (has no administration in time range)   glucose chewable tablet 16 g (has no administration in time range)   glucose chewable tablet 24 g (has no administration in time range)   acetaminophen tablet 650 mg (has no administration in time range)   HYDROcodone-acetaminophen 5-325 mg per tablet 1 tablet (has no administration in time range)   polyethylene glycol packet 17 g (has no administration in time range)   ondansetron disintegrating tablet 8 mg (has no administration in time range)   melatonin tablet 6 mg (has no administration in time range)   albuterol sulfate nebulizer solution 2.5 mg (has no administration in time range)   vancomycin - pharmacy to dose (has no administration in time range)   azithromycin (ZITHROMAX) 250 mg in dextrose 5 % 250 mL IVPB (has no administration in time range)   atorvastatin tablet 80 mg (80 mg Oral Given 1/31/22 2114)   morphine injection 4 mg (4 mg Intravenous Given 1/31/22 2047)    morphine injection 2 mg (has no administration in time range)   dextrose 50% injection 12.5 g (has no administration in time range)   glucagon (human recombinant) injection 1 mg (has no administration in time range)   insulin aspart U-100 pen 1-10 Units (has no administration in time range)   insulin detemir U-100 pen 10 Units (has no administration in time range)   lactated ringers bolus 1,000 mL (0 mLs Intravenous Paused 1/31/22 2132)   vancomycin 1.75 g in 5 % dextrose 500 mL IVPB (has no administration in time range)   meropenem (MERREM) 2 g in sodium chloride 0.9% 100 mL IVPB (has no administration in time range)   lactated ringers bolus 1,000 mL (0 mLs Intravenous Stopped 1/31/22 1920)   oxyCODONE-acetaminophen 5-325 mg per tablet 1 tablet (1 tablet Oral Given 1/31/22 1736)   sodium zirconium cyclosilicate packet 10 g (10 g Oral Given 1/31/22 2114)   albuterol sulfate nebulizer solution 10 mg (10 mg Nebulization Given 1/31/22 2013)   azithromycin 500 mg in dextrose 5 % 250 mL IVPB (ready to mix system) (0 mg Intravenous Stopped 1/31/22 2307)     Medical Decision Making:   History:   I obtained history from: someone other than patient.       <> Summary of History: I discussed this patient with her  who reports that she has been confused for about a week.  Old Medical Records: I decided to obtain old medical records.  Old Records Summarized: records from previous admission(s).  Initial Assessment:   56-year-old female presenting from rehab facility for altered mental status and concern for sepsis.  Hypertensive 167/64 with otherwise normal vitals.  She is alert but somewhat disoriented and appears ill.  Her incision site from her recently cared stump revision repair does not appear grossly infected.  Differential Diagnosis:   Sepsis  Dehydration  Electrolyte derangement  Her incision is not grossly appear infected  I am concerned about infection given that she has been on IV meropenem at rehab.  Of  "note, however patient arrived to this facility with vancomycin infusing which is not currently on her med list  Independently Interpreted Test(s):   I have ordered and independently interpreted X-rays - see summary below.       <> Summary of X-Ray Reading(s): No focal consolidation  I have ordered and independently interpreted EKG Reading(s) - see prior notes  Clinical Tests:   Lab Tests: Ordered and Reviewed  Radiological Study: Ordered and Reviewed  Medical Tests: Ordered and Reviewed  Sepsis Perfusion Assessment: "I attest a sepsis perfusion exam was performed within 6 hours of sepsis, severe sepsis, or septic shock presentation, following fluid resuscitation."    Sepsis Perfusion Assessment Complete: 1/31/2022 9:49 PM    ED Management:  Will check labs, give fluids, EKG, chest x-ray and reassess.  Will defer antibiotic treatment at this time given that patient already on broad-spectrum antibiotics.    Labs are notable ROSLYN as well as leukocytosis and elevated inflammatory markers.  Patient will be admitted to Hospital Medicine for further management.  Patient comfortable with admission.  I discussed this patient with my supervising physician.             ED Course as of 01/31/22 2351 Mon Jan 31, 2022   1712 Creatinine(!): 2.1  ROSLYN, increased from 1.4 4 days ago [CC]   1713 WBC(!): 14.72  Leukocytosis [CC]   1713 Hemoglobin(!): 6.6  Anemia, worse when compared to prior [CC]   1714 Lactate, Lorenzo: 0.7 [CC]   1715 Troponin I: 0.014 [CC]   1807 CRP(!): 122.5 [CC]   1842 Procalcitonin(!): 0.38 [CC]      ED Course User Index  [CC] Kelsy Garcia PA-C    Sepsis Perfusion Assessment: "I attest a sepsis perfusion exam was performed within 6 hours of sepsis, severe sepsis, or septic shock presentation, following fluid resuscitation."        Clinical Impression:   Final diagnoses:  [R41.82] Altered mental status  [N17.9] ROSLYN (acute kidney injury)  [A41.9, R65.20, G93.40] Sepsis with encephalopathy without septic " shock, due to unspecified organism (Primary)          ED Disposition Condition    Observation               Kelsy Garcia PA-C  01/31/22 0842

## 2022-02-01 NOTE — SUBJECTIVE & OBJECTIVE
Current Facility-Administered Medications on File Prior to Encounter   Medication    [DISCONTINUED] GENERIC EXTERNAL MEDICATION    [DISCONTINUED] GENERIC EXTERNAL MEDICATION    [DISCONTINUED] GENERIC EXTERNAL MEDICATION    [DISCONTINUED] lactated ringers infusion     Current Outpatient Medications on File Prior to Encounter   Medication Sig    acetaminophen (TYLENOL) 500 MG tablet Take 2 tablets (1,000 mg total) by mouth every 8 (eight) hours as needed for Pain.    allopurinoL (ZYLOPRIM) 100 MG tablet Take 1 tablet (100 mg total) by mouth once daily.    apixaban (ELIQUIS) 5 mg Tab Take 1 tablet (5 mg total) by mouth 2 (two) times daily.    atorvastatin (LIPITOR) 80 MG tablet Take 1 tablet (80 mg total) by mouth every evening.    blood-glucose meter,continuous (DEXCOM G6 ) Misc Use to check blood sugars 4 times/day    blood-glucose sensor (DEXCOM G6 SENSOR) Radha Apply one sensor to skin every 10 days    blood-glucose transmitter (DEXCOM G6 TRANSMITTER) Radha Replace transmitter every 3 months to use with dexcom sensor    clopidogreL (PLAVIX) 75 mg tablet Take 1 tablet (75 mg total) by mouth once daily.    furosemide (LASIX) 40 MG tablet Take 1 tablet (40 mg total) by mouth once daily.    HYDROcodone-acetaminophen (NORCO) 5-325 mg per tablet Take 1 tablet by mouth every 4 (four) hours as needed for Pain.    insulin aspart U-100 (NOVOLOG FLEXPEN U-100 INSULIN) 100 unit/mL (3 mL) InPn pen Inject 5 Units into the skin 3 (three) times daily with meals. If not eating, ok to hold    insulin detemir U-100 (LEVEMIR FLEXTOUCH) 100 unit/mL (3 mL) SubQ InPn pen Inject 12 Units into the skin every evening.    magnesium oxide (MAG-OX) 400 mg (241.3 mg magnesium) tablet Take 1 tablet (400 mg total) by mouth 2 (two) times daily.    melatonin (MELATIN) 3 mg tablet Take 2 tablets (6 mg total) by mouth nightly as needed for Insomnia.    MEROPENEM 1 G/100 ML NS, READY TO MIX SYSTEM, Inject 100 mLs (1 g total)  into the vein every 12 (twelve) hours. for 10 days    mirtazapine (REMERON) 7.5 MG Tab Take 2 tablets (15 mg total) by mouth nightly.    multivit/folic acid/vit K1 (ONE-A-DAY WOMEN'S 50 PLUS ORAL) Take 1 tablet by mouth Daily.    oxyCODONE-acetaminophen (PERCOCET)  mg per tablet Take 1 tablet by mouth every 6 (six) hours as needed.    pregabalin (LYRICA) 50 MG capsule Take 3 capsules (150 mg total) by mouth 3 (three) times daily.    sertraline (ZOLOFT) 50 MG tablet Take 1 tablet (50 mg total) by mouth once daily.    sodium bicarbonate 650 MG tablet Take 1 tablet (650 mg total) by mouth 2 (two) times daily.    [DISCONTINUED] lancing device Misc 1 Device by Misc.(Non-Drug; Combo Route) route 2 (two) times daily with meals.       Review of patient's allergies indicates:   Allergen Reactions    Ciprofloxacin Itching    Contrast media      Kidney injury    Iodine      Kidney injury       Past Medical History:   Diagnosis Date    Anxiety     Chronic pain syndrome     CKD (chronic kidney disease), stage III     Depression     Diabetes mellitus     type 2    Diabetes mellitus, type 2     GERD (gastroesophageal reflux disease)     Hyperemesis 3/23/2021    Hyperlipemia     Hypertension     Hypokalemia 3/23/2021    Myocardial infarction 2010    minor-caused by stress per pt.    Osteomyelitis     Osteomyelitis of left foot 4/30/2021    PVD (peripheral vascular disease)     Vaginal delivery     x1     Past Surgical History:   Procedure Laterality Date    ABOVE-KNEE AMPUTATION Left 5/18/2021    Procedure: AMPUTATION, ABOVE KNEE;  Surgeon: Teddy Huber MD;  Location: [facility] OR [address] [place] [address];  Service: Vascular;  Laterality: Left;    Angiogram - Right Extremity Right 7/9/15    angiogram-left leg  10/6/15    ANGIOGRAPHY OF LOWER EXTREMITY Left 4/29/2021    Procedure: ANGIOGRAM, LOWER EXTREMITY;  Surgeon: Teddy Huber MD;  Location: [facility] OR [address] [place] [address];  Service: Vascular;  Laterality: Left;     BELOW KNEE AMPUTATION OF LOWER EXTREMITY Right 12/28/2021    Procedure: AMPUTATION, BELOW KNEE;  Surgeon: Kaitlyn Rojas MD;  Location: [facility];  Service: General;  Laterality: Right;    CATHETERIZATION OF BOTH LEFT AND RIGHT HEART N/A 12/18/2019    Procedure: CATHETERIZATION, HEART, BOTH LEFT AND RIGHT;  Surgeon: Que Fernando III, MD;  Location: [facility] CATH LAB;  Service: Cardiology;  Laterality: N/A;    CORONARY ANGIOGRAPHY N/A 12/18/2019    Procedure: ANGIOGRAM, CORONARY ARTERY;  Surgeon: Que Fernando III, MD;  Location: [facility] CATH LAB;  Service: Cardiology;  Laterality: N/A;    CORONARY ANGIOGRAPHY INCLUDING BYPASS GRAFTS WITH CATHETERIZATION OF LEFT HEART N/A 7/28/2020    Procedure: ANGIOGRAM, CORONARY, INCLUDING BYPASS GRAFT, WITH LEFT HEART CATHETERIZATION, 9 am;  Surgeon: Rachel Easley MD;  Location: [facility] CATH LAB;  Service: Cardiology;  Laterality: N/A;    CORONARY ARTERY BYPASS GRAFT (CABG) N/A 1/14/2020    Procedure: CORONARY ARTERY BYPASS GRAFT (CABG) x 1     Off Pump;  Surgeon: Huang Altamirano MD;  Location: [facility] OR [address] [place] [address];  Service: Cardiovascular;  Laterality: N/A;    CREATION OF FEMORAL-TIBIAL ARTERY BYPASS Left 4/29/2021    Procedure: CREATION, BYPASS, ARTERIAL, FEMORAL TO ANTERIOR TIBIAL;  Surgeon: Teddy Huber MD;  Location: [facility] OR [address] [place] [address];  Service: Vascular;  Laterality: Left;    CREATION OF FEMOROPOPLITEAL ARTERIAL BYPASS USING GRAFT Left 8/18/2020    Procedure: CREATION, BYPASS, ARTERIAL, FEMORAL TO POPLITEAL, USING GRAFT, LEFT LOWER EXTREMITY;  Surgeon: Teddy Huber MD;  Location: [facility];  Service: Vascular;  Laterality: Left;  REQUEST 7:15 A.M. START----COVID NEGATIVE ON 8/17  1ST CASE STARTE PER LEANA ON 8/7/2020 @ 942AM-LO  RN PREOP 8/12/2020   T/S-----CLEARED BY CARDS-------PENDING INSURANCE    DEBRIDEMENT OF FOOT Left 9/8/2020    Procedure: DEBRIDEMENT, FOOT;  Surgeon: Rosio Mayes DPM;  Location: [facility];  Service: Podiatry;   Laterality: Left;  request neoxx .   RN Pre Op 9-4-2020, Covid negative on 9/5/20. C A    DEBRIDEMENT OF FOOT  3/4/2021    Procedure: DEBRIDEMENT, FOOT;  Surgeon: Teddy Huber MD;  Location: [facility] OR;  Service: Vascular;;    DEBRIDEMENT OF FOOT Left 3/9/2021    Procedure: DEBRIDEMENT, FOOT, bone biopsy;  Surgeon: Rosio Mayes DPM;  Location: [facility] OR;  Service: Podiatry;  Laterality: Left;  Request neoxx---COVID IN AM  REQUESTING NOON START  RN Phone Pre op.On Blood thinners Plavix and Eliquis.  Covid am of surgery. C A    DEBRIDEMENT OF FOOT Left 5/4/2021    Procedure: DEBRIDEMENT, FOOT;  Surgeon: Farooq Morley DPM;  [facility];  Service: Podiatry;  Laterality: Left;    INSERTION OF TUNNELED CENTRAL VENOUS HEMODIALYSIS CATHETER N/A 1/27/2020    Procedure: Insertion, Catheter, Central Venous, Hemodialysis;  Surgeon: ESTEBAN Gomez III, MD;  Location: [facility] CATH LAB;  Service: Peripheral Vascular;  Laterality: N/A;    PERCUTANEOUS TRANSLUMINAL ANGIOPLASTY N/A 3/4/2021    Procedure: PTA (ANGIOPLASTY, PERCUTANEOUS, TRANSLUMINAL);  Surgeon: Teddy Huber MD;  Location: [facility] OR;  Service: Vascular;  Laterality: N/A;    REMOVAL OF ARTERIOVENOUS GRAFT Left 5/27/2021    Procedure: REMOVAL, GRAFT, LEFT LOWER EXTREMITY, WOUND EXPLORATION;  Surgeon: Teddy Huber MD;  [facility];  Service: Vascular;  Laterality: Left;    REMOVAL OF NAIL OF DIGIT Left 3/9/2021    Procedure: REMOVAL, NAIL, DIGIT;  Surgeon: Rosio Mayes DPM;  Location: [facility] OR;  Service: Podiatry;  Laterality: Left;    THROMBECTOMY Left 3/4/2021    Procedure: THROMBECTOMY, LEFT LOWER EXTREMITY BYPASS GRAFT, ANGIOGRAM, POSSIBLE INTERVENTION, POSSIBLE LEFT LOWER EXTREMITY BYPASS;  Surgeon: Teddy Huber MD;  Location: [facility] OR;  Service: Vascular;  Laterality: Left;    THROMBECTOMY Left 4/29/2021    Procedure: GRAFT THROMBECTOMY, LEFT LOWER EXTREMITY;  Surgeon: Teddy Huber MD;   [facility];  Service: Vascular;  Laterality: Left;  14.5 min  1179.85 mGy  341.01 Gycm2  240 ml dye    THROMBECTOMY  10/22/2021    Procedure: THROMBECTOMY;  Surgeon: Saad Arenas MD;  Location: [facility] CATH LAB/EP;  Service: Cardiology;;     Family History     Problem Relation (Age of Onset)    Diabetes Mother, Father, Paternal Grandmother    Heart disease Maternal Grandmother    No Known Problems Maternal Grandfather, Paternal Grandfather        Tobacco Use    Smoking status: Former Smoker    Smokeless tobacco: Never Used   Substance and Sexual Activity    Alcohol use: No    Drug use: Yes     Types: Marijuana     Comment: occassional    Sexual activity: Yes     Partners: Male     Review of Systems   Constitutional: Negative for chills and fever.   HENT: Negative for hearing loss and voice change.    Eyes: Negative for discharge and redness.   Respiratory: Negative for shortness of breath.    Cardiovascular: Negative for chest pain.   Gastrointestinal: Negative for abdominal pain, diarrhea and vomiting.   Musculoskeletal: Positive for gait problem.        L AKA  R BKA   Skin: Negative for pallor.   Neurological: Negative for dizziness and weakness.   Psychiatric/Behavioral: Positive for confusion.     Objective:     Vital Signs (Most Recent):  Temp: 98 °F (36.7 °C) (02/01/22 1608)  Pulse: 98 (02/01/22 1608)  Resp: 18 (02/01/22 1608)  BP: (!) 112/53 (02/01/22 1608)  SpO2: (!) 92 % (02/01/22 1608) Vital Signs (24h Range):  Temp:  [98 °F (36.7 °C)-99.6 °F (37.6 °C)] 98 °F (36.7 °C)  Pulse:  [] 98  Resp:  [13-22] 18  SpO2:  [92 %-100 %] 92 %  BP: ()/(47-97) 112/53     Weight: 67.6 kg (149 lb 0.5 oz)  Body mass index is 24.8 kg/m².    Physical Exam  Vitals reviewed.   Constitutional:       General: She is not in acute distress.     Appearance: She is well-developed and well-nourished.   HENT:      Head: Normocephalic and atraumatic.      Mouth/Throat:      Mouth: Oropharynx is clear and  moist.   Eyes:      General:         Right eye: No discharge.         Left eye: No discharge.      Extraocular Movements: EOM normal.      Conjunctiva/sclera: Conjunctivae normal.   Cardiovascular:      Rate and Rhythm: Normal rate and regular rhythm.   Pulmonary:      Effort: Pulmonary effort is normal. No respiratory distress.   Abdominal:      General: There is no distension.      Palpations: Abdomen is soft.   Musculoskeletal:         General: Deformity present.      Cervical back: Normal range of motion.      Comments: S/p L AKA  S/p R BKA, slow ooze from medial aspect of wound, mild wound dehiscence but incisions in place, no evidence of purulence or surrounding erythema   Skin:     General: Skin is warm and dry.   Neurological:      Mental Status: She is alert and oriented to person, place, and time.   Psychiatric:         Mood and Affect: Mood and affect normal.         Behavior: Behavior normal.         Significant Labs:  I have reviewed all pertinent lab results within the past 24 hours.  CBC:   Recent Labs   Lab 02/01/22  0428   WBC 15.00*   RBC 2.37*   HGB 7.1*   HCT 23.3*      MCV 98   MCH 30.0   MCHC 30.5*     CMP:   Recent Labs   Lab 02/01/22  0428   GLU 93   CALCIUM 9.4   ALBUMIN 1.9*   PROT 7.4      K 4.7   CO2 28      BUN 58*   CREATININE 1.8*   ALKPHOS 111   ALT 14   AST 26   BILITOT 0.6       Significant Diagnostics:  I have reviewed all pertinent imaging results/findings within the past 24 hours.     MRI Tib/Fib:  FINDINGS:  Please note the study is incomplete and was prematurely terminated secondary to patient discomfort.     There is postsurgical change of recent below the knee right lower extremity amputation.  There is soft tissue edema about the distal tibial and fibular stumps presumably postoperative in etiology.  There is small volume of relatively ill-defined fluid about the distal tibial stump posteriorly.  There is minimal marrow edema at the resection margins of  the distal tibia and fibula without definite MRI evidence to suggest osteomyelitis.     Visualized portions of the right knee demonstrate no acute abnormalities.  There is a small joint effusion present.     Impression:     Limited examination, incompletely and prematurity terminated secondary to patient discomfort.     Postsurgical change of right below-the-knee amputation with mild soft tissue edema and small volume ill-defined fluid about the distal tibial resection stump.  No discrete well-defined collection appreciated on this limited exam, however repeat assessment as warranted.  No definite MRI evidence to suggest osteomyelitis.

## 2022-02-01 NOTE — ASSESSMENT & PLAN NOTE
Patient has myoclonal jerks on exam  - likely in setting of pregabalin build with ROSLYN    Plan  - holding home pregabalin  - neuro checks

## 2022-02-01 NOTE — ASSESSMENT & PLAN NOTE
56 y.o. female with PMHx of CKD III, DM, GERD, HLD, HTN, PAD with extensive history of vascular interventions ultimately with L AKA in May 2021 and now R BKA in December 2021 with stump revision on 1/24/22. General surgery consulted to evaluate wound as source of elevated WBC.     - No evidence of purulent drainage or surrounding erythema. Stump is mild to moderately swollen with oozing noted on exam. MRI of R stump shows edema and small fluid collection (can be expected with Pt on POD8) adjacent to tibia, negative for osteomyelitis. We will continue to follow and evaluate her wound for signs of infection, no surgical intervention recommended at this time.   - Remainder of care per primary  - Please call with questions

## 2022-02-01 NOTE — HPI
Suyapa Connelly is a 56 y.o. female with PMHx of CKD III, DM, GERD, HLD, HTN, PAD with extensive history of vascular interventions ultimately with L AKA in May 2021 and now R BKA in December 2021 with stump revision on 1/24/22 who was sent here from rehab facility with confusion yesterday. Difficult to obtain clear history from patient, however, she does note ongoing pain at the R BKA stump site. Upon presentation here, noted to have drainage from the stump as well. WBC elevated at 15, though afebrile. MRI obtained which demonstrates edema at R BKA stump site with small fluid collection around distal tibia. Her dressing was changed today, has had bleeding from site afterward.   She underwent stump revision due to evidence of infection, culture positive for Pseudomonas. She is currently on Vanc, Meropenem, and Azithromycin.

## 2022-02-02 LAB
ALBUMIN SERPL BCP-MCNC: 1.8 G/DL (ref 3.5–5.2)
ALP SERPL-CCNC: 100 U/L (ref 55–135)
ALT SERPL W/O P-5'-P-CCNC: 13 U/L (ref 10–44)
ANION GAP SERPL CALC-SCNC: 9 MMOL/L (ref 8–16)
AST SERPL-CCNC: 29 U/L (ref 10–40)
BACTERIA UR CULT: NORMAL
BASOPHILS # BLD AUTO: 0.1 K/UL (ref 0–0.2)
BASOPHILS NFR BLD: 1.1 % (ref 0–1.9)
BILIRUB SERPL-MCNC: 0.4 MG/DL (ref 0.1–1)
BUN SERPL-MCNC: 52 MG/DL (ref 6–20)
CALCIUM SERPL-MCNC: 9.4 MG/DL (ref 8.7–10.5)
CHLORIDE SERPL-SCNC: 100 MMOL/L (ref 95–110)
CO2 SERPL-SCNC: 28 MMOL/L (ref 23–29)
CREAT SERPL-MCNC: 1.5 MG/DL (ref 0.5–1.4)
DIFFERENTIAL METHOD: ABNORMAL
EOSINOPHIL # BLD AUTO: 0.4 K/UL (ref 0–0.5)
EOSINOPHIL NFR BLD: 4.3 % (ref 0–8)
ERYTHROCYTE [DISTWIDTH] IN BLOOD BY AUTOMATED COUNT: 13.9 % (ref 11.5–14.5)
EST. GFR  (AFRICAN AMERICAN): 44.9 ML/MIN/1.73 M^2
EST. GFR  (NON AFRICAN AMERICAN): 38.9 ML/MIN/1.73 M^2
GLUCOSE SERPL-MCNC: 86 MG/DL (ref 70–110)
HCT VFR BLD AUTO: 22.5 % (ref 37–48.5)
HGB BLD-MCNC: 7.1 G/DL (ref 12–16)
IMM GRANULOCYTES # BLD AUTO: 0.03 K/UL (ref 0–0.04)
IMM GRANULOCYTES NFR BLD AUTO: 0.3 % (ref 0–0.5)
LYMPHOCYTES # BLD AUTO: 1.8 K/UL (ref 1–4.8)
LYMPHOCYTES NFR BLD: 19.8 % (ref 18–48)
MAGNESIUM SERPL-MCNC: 2.6 MG/DL (ref 1.6–2.6)
MCH RBC QN AUTO: 31 PG (ref 27–31)
MCHC RBC AUTO-ENTMCNC: 31.6 G/DL (ref 32–36)
MCV RBC AUTO: 98 FL (ref 82–98)
MONOCYTES # BLD AUTO: 0.8 K/UL (ref 0.3–1)
MONOCYTES NFR BLD: 8.9 % (ref 4–15)
NEUTROPHILS # BLD AUTO: 5.9 K/UL (ref 1.8–7.7)
NEUTROPHILS NFR BLD: 65.6 % (ref 38–73)
NRBC BLD-RTO: 0 /100 WBC
PHOSPHATE SERPL-MCNC: 4.9 MG/DL (ref 2.7–4.5)
PLATELET # BLD AUTO: 408 K/UL (ref 150–450)
PMV BLD AUTO: 11.9 FL (ref 9.2–12.9)
POCT GLUCOSE: 103 MG/DL (ref 70–110)
POCT GLUCOSE: 110 MG/DL (ref 70–110)
POCT GLUCOSE: 137 MG/DL (ref 70–110)
POCT GLUCOSE: 155 MG/DL (ref 70–110)
POTASSIUM SERPL-SCNC: 4.5 MMOL/L (ref 3.5–5.1)
PROCALCITONIN SERPL IA-MCNC: 0.29 NG/ML
PROT SERPL-MCNC: 7.2 G/DL (ref 6–8.4)
RBC # BLD AUTO: 2.29 M/UL (ref 4–5.4)
SODIUM SERPL-SCNC: 137 MMOL/L (ref 136–145)
VANCOMYCIN SERPL-MCNC: 21.3 UG/ML
WBC # BLD AUTO: 8.98 K/UL (ref 3.9–12.7)

## 2022-02-02 PROCEDURE — 84100 ASSAY OF PHOSPHORUS: CPT | Mod: HCNC | Performed by: STUDENT IN AN ORGANIZED HEALTH CARE EDUCATION/TRAINING PROGRAM

## 2022-02-02 PROCEDURE — 25000003 PHARM REV CODE 250: Mod: HCNC | Performed by: STUDENT IN AN ORGANIZED HEALTH CARE EDUCATION/TRAINING PROGRAM

## 2022-02-02 PROCEDURE — 99233 PR SUBSEQUENT HOSPITAL CARE,LEVL III: ICD-10-PCS | Mod: HCNC,GC,, | Performed by: STUDENT IN AN ORGANIZED HEALTH CARE EDUCATION/TRAINING PROGRAM

## 2022-02-02 PROCEDURE — 99233 SBSQ HOSP IP/OBS HIGH 50: CPT | Mod: HCNC,GC,, | Performed by: STUDENT IN AN ORGANIZED HEALTH CARE EDUCATION/TRAINING PROGRAM

## 2022-02-02 PROCEDURE — 63600175 PHARM REV CODE 636 W HCPCS: Mod: HCNC

## 2022-02-02 PROCEDURE — 63700000 PHARM REV CODE 250 ALT 637 W/O HCPCS: Mod: HCNC | Performed by: STUDENT IN AN ORGANIZED HEALTH CARE EDUCATION/TRAINING PROGRAM

## 2022-02-02 PROCEDURE — 99233 PR SUBSEQUENT HOSPITAL CARE,LEVL III: ICD-10-PCS | Mod: HCNC,,, | Performed by: PHYSICIAN ASSISTANT

## 2022-02-02 PROCEDURE — 27000207 HC ISOLATION: Mod: HCNC

## 2022-02-02 PROCEDURE — 63600175 PHARM REV CODE 636 W HCPCS: Mod: HCNC | Performed by: STUDENT IN AN ORGANIZED HEALTH CARE EDUCATION/TRAINING PROGRAM

## 2022-02-02 PROCEDURE — 80202 ASSAY OF VANCOMYCIN: CPT | Mod: HCNC | Performed by: STUDENT IN AN ORGANIZED HEALTH CARE EDUCATION/TRAINING PROGRAM

## 2022-02-02 PROCEDURE — 85025 COMPLETE CBC W/AUTO DIFF WBC: CPT | Mod: HCNC | Performed by: STUDENT IN AN ORGANIZED HEALTH CARE EDUCATION/TRAINING PROGRAM

## 2022-02-02 PROCEDURE — 99233 SBSQ HOSP IP/OBS HIGH 50: CPT | Mod: HCNC,,, | Performed by: PHYSICIAN ASSISTANT

## 2022-02-02 PROCEDURE — 83735 ASSAY OF MAGNESIUM: CPT | Mod: HCNC | Performed by: STUDENT IN AN ORGANIZED HEALTH CARE EDUCATION/TRAINING PROGRAM

## 2022-02-02 PROCEDURE — 11000001 HC ACUTE MED/SURG PRIVATE ROOM: Mod: HCNC

## 2022-02-02 PROCEDURE — 80053 COMPREHEN METABOLIC PANEL: CPT | Mod: HCNC | Performed by: STUDENT IN AN ORGANIZED HEALTH CARE EDUCATION/TRAINING PROGRAM

## 2022-02-02 PROCEDURE — 84145 PROCALCITONIN (PCT): CPT | Mod: HCNC | Performed by: STUDENT IN AN ORGANIZED HEALTH CARE EDUCATION/TRAINING PROGRAM

## 2022-02-02 RX ORDER — INSULIN ASPART 100 [IU]/ML
5 INJECTION, SOLUTION INTRAVENOUS; SUBCUTANEOUS
Status: DISCONTINUED | OUTPATIENT
Start: 2022-02-02 | End: 2022-02-10 | Stop reason: HOSPADM

## 2022-02-02 RX ORDER — SENNOSIDES 8.6 MG/1
8.6 TABLET ORAL DAILY
Status: DISCONTINUED | OUTPATIENT
Start: 2022-02-02 | End: 2022-02-10 | Stop reason: HOSPADM

## 2022-02-02 RX ORDER — SERTRALINE HYDROCHLORIDE 50 MG/1
50 TABLET, FILM COATED ORAL DAILY
Status: DISCONTINUED | OUTPATIENT
Start: 2022-02-02 | End: 2022-02-10 | Stop reason: HOSPADM

## 2022-02-02 RX ORDER — HEPARIN SODIUM 5000 [USP'U]/ML
5000 INJECTION, SOLUTION INTRAVENOUS; SUBCUTANEOUS EVERY 8 HOURS
Status: COMPLETED | OUTPATIENT
Start: 2022-02-02 | End: 2022-02-03

## 2022-02-02 RX ORDER — PREGABALIN 75 MG/1
75 CAPSULE ORAL 2 TIMES DAILY
Status: DISCONTINUED | OUTPATIENT
Start: 2022-02-02 | End: 2022-02-04

## 2022-02-02 RX ADMIN — PREGABALIN 75 MG: 75 CAPSULE ORAL at 08:02

## 2022-02-02 RX ADMIN — MORPHINE SULFATE 4 MG: 4 INJECTION INTRAVENOUS at 05:02

## 2022-02-02 RX ADMIN — INSULIN ASPART 5 UNITS: 100 INJECTION, SOLUTION INTRAVENOUS; SUBCUTANEOUS at 04:02

## 2022-02-02 RX ADMIN — AZITHROMYCIN DIHYDRATE 250 MG: 250 TABLET, FILM COATED ORAL at 09:02

## 2022-02-02 RX ADMIN — MORPHINE SULFATE 2 MG: 2 INJECTION, SOLUTION INTRAMUSCULAR; INTRAVENOUS at 06:02

## 2022-02-02 RX ADMIN — SERTRALINE HYDROCHLORIDE 50 MG: 50 TABLET ORAL at 02:02

## 2022-02-02 RX ADMIN — INSULIN DETEMIR 10 UNITS: 100 INJECTION, SOLUTION SUBCUTANEOUS at 09:02

## 2022-02-02 RX ADMIN — Medication 6 MG: at 09:02

## 2022-02-02 RX ADMIN — SENNOSIDES 8.6 MG: 8.6 TABLET ORAL at 02:02

## 2022-02-02 RX ADMIN — INSULIN ASPART 2 UNITS: 100 INJECTION, SOLUTION INTRAVENOUS; SUBCUTANEOUS at 12:02

## 2022-02-02 RX ADMIN — MEROPENEM 2 G: 1 INJECTION INTRAVENOUS at 12:02

## 2022-02-02 RX ADMIN — HYDROCODONE BITARTRATE AND ACETAMINOPHEN 1 TABLET: 5; 325 TABLET ORAL at 08:02

## 2022-02-02 RX ADMIN — POLYETHYLENE GLYCOL 3350 17 G: 17 POWDER, FOR SOLUTION ORAL at 08:02

## 2022-02-02 RX ADMIN — MEROPENEM 2 G: 1 INJECTION INTRAVENOUS at 10:02

## 2022-02-02 RX ADMIN — HYDROCODONE BITARTRATE AND ACETAMINOPHEN 1 TABLET: 5; 325 TABLET ORAL at 10:02

## 2022-02-02 RX ADMIN — MORPHINE SULFATE 2 MG: 2 INJECTION, SOLUTION INTRAMUSCULAR; INTRAVENOUS at 12:02

## 2022-02-02 RX ADMIN — MORPHINE SULFATE 2 MG: 2 INJECTION, SOLUTION INTRAMUSCULAR; INTRAVENOUS at 09:02

## 2022-02-02 RX ADMIN — ATORVASTATIN CALCIUM 80 MG: 40 TABLET, FILM COATED ORAL at 08:02

## 2022-02-02 RX ADMIN — HEPARIN SODIUM 5000 UNITS: 5000 INJECTION INTRAVENOUS; SUBCUTANEOUS at 09:02

## 2022-02-02 NOTE — ASSESSMENT & PLAN NOTE
HPatient with acute kidney injury likely d/t IVVD/Dehydration Which is currently worsening. Labs reviewed- Renal function/electrolytes with Estimated Creatinine Clearance: 31.8 mL/min (A) (based on SCr of 1.8 mg/dL (H)). according to latest data. Monitor urine output and serial BMP and adjust therapy as needed. Avoid nephrotoxins and renally dose meds for GFR listed above.     Plan  - strict I/Os  - renal lytes pending  - renally dose meds  - avoid nephrotoxic agents

## 2022-02-02 NOTE — PROGRESS NOTES
[facility] Surg  General Surgery  Progress Note    Subjective:     History of Present Illness:  Suyapa Connelly is a 56 y.o. female with PMHx of CKD III, DM, GERD, HLD, HTN, PAD with extensive history of vascular interventions ultimately with L AKA in May 2021 and now R BKA in December 2021 with stump revision on 1/24/22 who was sent here from rehab facility with confusion yesterday. Difficult to obtain clear history from patient, however, she does note ongoing pain at the R BKA stump site. Upon presentation here, noted to have drainage from the stump as well. WBC elevated at 15, though afebrile. MRI obtained which demonstrates edema at R BKA stump site with small fluid collection around distal tibia. Her dressing was changed today, has had bleeding from site afterward.   She underwent stump revision due to evidence of infection, culture positive for Pseudomonas. She is currently on Vanc, Meropenem, and Azithromycin.       Post-Op Info:  * No surgery found *         Interval History: NAEON. Afebrile. HDS. Reports feeling better this morning. Pain is controlled with current regimen. No new symptoms or concerns this morning. All questions answered.   WBC 15 > 8    Medications:  Continuous Infusions:  Scheduled Meds:   atorvastatin  80 mg Oral QHS    azithromycin  250 mg Oral Daily    insulin detemir U-100  10 Units Subcutaneous QHS    meropenem (MERREM) IVPB  2 g Intravenous Q12H    polyethylene glycol  17 g Oral Daily     PRN Meds:acetaminophen, albuterol sulfate, dextrose 50%, dextrose 50%, glucagon (human recombinant), glucose, glucose, hydrALAZINE, HYDROcodone-acetaminophen, insulin aspart U-100, melatonin, morphine, morphine, naloxone, ondansetron, sodium chloride 0.9%, Pharmacy to dose Vancomycin consult **AND** vancomycin - pharmacy to dose     Review of patient's allergies indicates:   Allergen Reactions    Ciprofloxacin Itching    Contrast media      Kidney injury    Iodine      Kidney injury      Objective:     Vital Signs (Most Recent):  Temp: 97 °F (36.1 °C) (02/02/22 0820)  Pulse: 95 (02/02/22 0820)  Resp: 18 (02/02/22 0820)  BP: (!) 115/53 (02/02/22 0820)  SpO2: 100 % (02/02/22 0820) Vital Signs (24h Range):  Temp:  [97 °F (36.1 °C)-98.4 °F (36.9 °C)] 97 °F (36.1 °C)  Pulse:  [] 95  Resp:  [16-18] 18  SpO2:  [92 %-100 %] 100 %  BP: (110-142)/(52-67) 115/53     Weight: 67.6 kg (149 lb 0.5 oz)  Body mass index is 24.8 kg/m².    Intake/Output - Last 3 Shifts       01/31 0700  02/01 0659 02/01 0700  02/02 0659 02/02 0700  02/03 0659    P.O.  240     Blood 602.1      IV Piggyback 1350 350     Total Intake(mL/kg) 1952.1 (28.9) 590 (8.7)     Net +1952.1 +590            Stool Occurrence  1 x           Physical Exam  Vitals and nursing note reviewed.   Constitutional:       General: She is not in acute distress.     Appearance: She is not diaphoretic.      Comments: Room air   HENT:      Head: Normocephalic and atraumatic.      Mouth/Throat:      Mouth: Mucous membranes are moist.      Pharynx: Oropharynx is clear.   Eyes:      Extraocular Movements: Extraocular movements intact.      Conjunctiva/sclera: Conjunctivae normal.   Cardiovascular:      Rate and Rhythm: Normal rate.   Pulmonary:      Effort: Pulmonary effort is normal. No respiratory distress.   Musculoskeletal:         General: No deformity.      Comments: L AKA  R BKA, hemostatic with old blood on dressing, mild wound dehiscence but incisions in place, no evidence of purulence or surrounding erythema    Skin:     General: Skin is warm and dry.   Neurological:      Mental Status: She is alert and oriented to person, place, and time.         Significant Labs:  I have reviewed all pertinent lab results within the past 24 hours.  CBC:   Recent Labs   Lab 02/02/22 0612   WBC 8.98   RBC 2.29*   HGB 7.1*   HCT 22.5*      MCV 98   MCH 31.0   MCHC 31.6*     CMP:   Recent Labs   Lab 02/02/22  0612   GLU 86   CALCIUM 9.4   ALBUMIN 1.8*   PROT  7.2      K 4.5   CO2 28      BUN 52*   CREATININE 1.5*   ALKPHOS 100   ALT 13   AST 29   BILITOT 0.4       Significant Diagnostics:  I have reviewed all pertinent imaging results/findings within the past 24 hours.    Assessment/Plan:     S/P BKA (below knee amputation) unilateral, right  56 y.o. female with PMHx of CKD III, DM, GERD, HLD, HTN, PAD with extensive history of vascular interventions ultimately with L AKA in May 2021 and now R BKA in December 2021 with stump revision on 1/24/22. General surgery consulted to evaluate wound as source of elevated WBC.     - No evidence of purulent drainage or surrounding erythema. Stump is mild to moderately swollen with oozing noted on exam. MRI of R stump shows edema and small fluid collection (can be expected with Pt on POD8) adjacent to tibia, negative for osteomyelitis. We will continue to follow and evaluate her wound for signs of infection, no surgical intervention recommended at this time.   - Continue local wound care  - Remainder of care per primary team  - Please contact general surgery with any questions, concerns, or clinical status changes          Buzz Goetz PA-C  General Surgery  [facility] - Med Surg

## 2022-02-02 NOTE — ASSESSMENT & PLAN NOTE
Ms. Connelly is a 55 yo female s/p r BKA and hx of Pseudomonas infection at the site of surgery presenting with AMS, leukocytosis, and elevated procalc  - likely sepsis due to wound infection/OM  - ESR and CRP slightly elevated  - Procalc of 0.38    Plan  - wound care consulted  - blood cx x2, wound cx, resp cx  - urine cx in process to r/o other source  - CBC daily to trend WCC  - meropenem given hx of Pseudomonal infection, azithro to cover possible PNA  - ID consulted, appreciate recs  -MRI Sacrum and R leg to r/o OM  - Gen Surg following given repeated wound infection s/p BKA

## 2022-02-02 NOTE — SUBJECTIVE & OBJECTIVE
Interval History: NAEON. Afebrile. Tachycardic up to 108. On 2LNC for oxygen support. Patient was reporting feeling well and denied any issues except of right knee oozing. However, upon re-visit she was disoriented. Delirium precautions placed. SLP performed successful and diet started. Cefepime changed to Meropenem. Azithromycin added.     Review of Systems   Unable to perform ROS: Mental status change   Constitutional: Negative for chills and fever.   HENT: Negative for congestion and rhinorrhea.    Eyes: Negative for pain and visual disturbance.   Respiratory: Negative for chest tightness and shortness of breath.    Cardiovascular: Negative for chest pain and palpitations.   Gastrointestinal: Negative for abdominal distention, abdominal pain, constipation, diarrhea, nausea and vomiting.   Genitourinary: Negative for difficulty urinating and dysuria.   Musculoskeletal: Negative for back pain and neck pain.   Skin: Positive for wound (Right BKA surgical scar oozing). Negative for rash.   Neurological: Negative for dizziness and headaches.   Psychiatric/Behavioral: Negative for confusion and sleep disturbance.     Objective:     Vital Signs (Most Recent):  Temp: 98 °F (36.7 °C) (02/01/22 1608)  Pulse: 105 (02/01/22 1737)  Resp: 18 (02/01/22 1608)  BP: (!) 112/53 (02/01/22 1608)  SpO2: (!) 92 % (02/01/22 1608) Vital Signs (24h Range):  Temp:  [98 °F (36.7 °C)-98.6 °F (37 °C)] 98 °F (36.7 °C)  Pulse:  [] 105  Resp:  [13-22] 18  SpO2:  [92 %-100 %] 92 %  BP: ()/(47-97) 112/53     Weight: 67.6 kg (149 lb 0.5 oz)  Body mass index is 24.8 kg/m².    Intake/Output Summary (Last 24 hours) at 2/1/2022 1846  Last data filed at 2/1/2022 1333  Gross per 24 hour   Intake 2302.08 ml   Output --   Net 2302.08 ml      Physical Exam  Vitals and nursing note reviewed.   Constitutional:       General: She is not in acute distress.     Appearance: Normal appearance. She is not ill-appearing or diaphoretic.       Interventions: Nasal cannula in place.      Comments: 2LNC   HENT:      Head: Normocephalic and atraumatic.      Right Ear: External ear normal.      Left Ear: External ear normal.      Nose: Nose normal. No congestion or rhinorrhea.      Mouth/Throat:      Mouth: Mucous membranes are dry.      Pharynx: Oropharynx is clear.   Eyes:      General: No scleral icterus.     Extraocular Movements: Extraocular movements intact.      Pupils: Pupils are equal, round, and reactive to light.   Cardiovascular:      Rate and Rhythm: Regular rhythm. Tachycardia present.      Pulses: Normal pulses.      Heart sounds: Normal heart sounds.   Pulmonary:      Effort: Pulmonary effort is normal.      Breath sounds: Normal breath sounds. No wheezing, rhonchi or rales.   Abdominal:      General: Bowel sounds are normal. There is no distension.      Palpations: Abdomen is soft.      Tenderness: There is no abdominal tenderness. There is no right CVA tenderness or left CVA tenderness.   Musculoskeletal:         General: Normal range of motion.      Cervical back: Normal range of motion and neck supple. No tenderness.      Right Lower Extremity: Right leg is amputated below knee.      Left Lower Extremity: Left leg is amputated below knee.   Lymphadenopathy:      Cervical: No cervical adenopathy.   Skin:     General: Skin is warm and dry.      Findings: Lesion (Right knee bandaged ) and rash present. Rash is scaling.   Neurological:      Mental Status: She is alert. She is disoriented.   Psychiatric:         Mood and Affect: Mood normal.         Behavior: Behavior normal.         Thought Content: Thought content normal.         Judgment: Judgment normal.         Significant Labs: All pertinent labs within the past 24 hours have been reviewed.    Significant Imaging: I have reviewed all pertinent imaging results/findings within the past 24 hours.

## 2022-02-02 NOTE — MED STUDENT SUBJECTIVE & OBJECTIVE
"Medical Student Subjective & Objective     Principal Problem: Severe sepsis    Chief Complaint:   Chief Complaint   Patient presents with    Fatigue     PT came from [facility] with reports of pt becoming more lethargic than normal. Pt is at baseline neuro. PT is at the rehab after Right BKA. Pt is on Vanco PICC line on the right upper arm.        HPI: Suyapa Connelly is a 57 y/o female with PMHx CKD3, DMII, GERD, HLD, HTN, osteomyelitis, PVD, depression, and anxiety presents to ED with  for concerns of increased confusion from her rehab facility.  at bedside is concern for progressively worsening appearance of RLE wound, along with continued drainage, and worsening mental status of his wife which has progressed through this week. Patient emotionally labile and responds yes or no to most questions. She reports increased pain in her R leg with nausea over the past week. She also reports constipation without abdominal pain. She denies dysuria. Patient denies fevers, chills, vomiting, chest pain, cough, and shortness of breath.     Patient underwent right BKA by Dr. Rojas on 12/28 then discharged to rehab facility on 1/10. Since then she has recurrent hospital visits for the wound, requiring revisions and debridements; the last resulted in a pseudomonas positive wound culture resistant to cefepime 1/20. [facility] RN stated that patient was "very confused this morning and more lethargic than normal." Per rehab, pt is normally AOx3 and able to follow commands. Rehab states they were concerned for sepsis due to elevated WBC and ROSLYN. Per , her last known normal was 1 weeks ago.    She was admitted to hospital medicine for severe sepsis.       Hospital Course: Patient was admitted to hospital 1/31 for AMS and concern about worsening appearance of RLE wound. Patient was started on antibiotics (cefepime, vancomycin & azithromycin), blood, wound, resp & urine samples were collected for culture " due to concern for sepsis and ID and Gen Surg (s/p BKA on 12/28) were consulted. Pregabalin was withheld due to patient's myoclonus, likely 2/2 to ROSLYN leading to increased serum concentrations. Overnight there were JUAN. On the morning of 2/1, patient's mental status had improved slightly. Cefepime was switched to meropenem.      Interval History: NAEO. Patient was AOx4 upon examination in the morning but experienced a change in MS later in the morning. Delirium precautions in place.     Past Medical History:   Diagnosis Date    Anxiety     Chronic pain syndrome     CKD (chronic kidney disease), stage III     Depression     Diabetes mellitus     type 2    Diabetes mellitus, type 2     GERD (gastroesophageal reflux disease)     Hyperemesis 3/23/2021    Hyperlipemia     Hypertension     Hypokalemia 3/23/2021    Myocardial infarction 2010    minor-caused by stress per pt.    Osteomyelitis     Osteomyelitis of left foot 4/30/2021    PVD (peripheral vascular disease)     Vaginal delivery     x1       Past Surgical History:   Procedure Laterality Date    ABOVE-KNEE AMPUTATION Left 5/18/2021    Procedure: AMPUTATION, ABOVE KNEE;  Surgeon: Teddy Huber MD;  Location: [facility] OR [address] [place] [address];  Service: Vascular;  Laterality: Left;    Angiogram - Right Extremity Right 7/9/15    angiogram-left leg  10/6/15    ANGIOGRAPHY OF LOWER EXTREMITY Left 4/29/2021    Procedure: ANGIOGRAM, LOWER EXTREMITY;  Surgeon: Teddy Huber MD;  Location: [address];  Service: Vascular;  Laterality: Left;    BELOW KNEE AMPUTATION OF LOWER EXTREMITY Right 12/28/2021    Procedure: AMPUTATION, BELOW KNEE;  Surgeon: Kaitlyn Rojas MD;  Location: [facility] OR;  Service: General;  Laterality: Right;    CATHETERIZATION OF BOTH LEFT AND RIGHT HEART N/A 12/18/2019    Procedure: CATHETERIZATION, HEART, BOTH LEFT AND RIGHT;  Surgeon: Que Fernando III, MD;  Location: [facility] CATH LAB;  Service: Cardiology;  Laterality: N/A;     CORONARY ANGIOGRAPHY N/A 12/18/2019    Procedure: ANGIOGRAM, CORONARY ARTERY;  Surgeon: Que Fernando III, MD;  Location: [facility] CATH LAB;  Service: Cardiology;  Laterality: N/A;    CORONARY ANGIOGRAPHY INCLUDING BYPASS GRAFTS WITH CATHETERIZATION OF LEFT HEART N/A 7/28/2020    Procedure: ANGIOGRAM, CORONARY, INCLUDING BYPASS GRAFT, WITH LEFT HEART CATHETERIZATION, 9 am;  Surgeon: Rachel Easley MD;  Location: [facility] CATH LAB;  Service: Cardiology;  Laterality: N/A;    CORONARY ARTERY BYPASS GRAFT (CABG) N/A 1/14/2020    Procedure: CORONARY ARTERY BYPASS GRAFT (CABG) x 1     Off Pump;  Surgeon: Huang Altamirano MD;  Location: [facility] OR [address] [place] [address];  Service: Cardiovascular;  Laterality: N/A;    CREATION OF FEMORAL-TIBIAL ARTERY BYPASS Left 4/29/2021    Procedure: CREATION, BYPASS, ARTERIAL, FEMORAL TO ANTERIOR TIBIAL;  Surgeon: Teddy Huber MD;  [facility];  Service: Vascular;  Laterality: Left;    CREATION OF FEMOROPOPLITEAL ARTERIAL BYPASS USING GRAFT Left 8/18/2020    Procedure: CREATION, BYPASS, ARTERIAL, FEMORAL TO POPLITEAL, USING GRAFT, LEFT LOWER EXTREMITY;  Surgeon: Teddy Huber MD;  Location: [facility] OR;  Service: Vascular;  Laterality: Left;  REQUEST 7:15 A.M. START----COVID NEGATIVE ON 8/17  1ST CASE JASWANT PER LEANA ON 8/7/2020 @ 942AM-LO  RN PREOP 8/12/2020   T/S-----CLEARED BY CARDS-------PENDING INSURANCE    DEBRIDEMENT OF FOOT Left 9/8/2020    Procedure: DEBRIDEMENT, FOOT;  Surgeon: Rosio Mayes DPM;  Location: [facility] OR;  Service: Podiatry;  Laterality: Left;  request neoxx .   RN Pre Op 9-4-2020, Covid negative on 9/5/20. C A    DEBRIDEMENT OF FOOT  3/4/2021    Procedure: DEBRIDEMENT, FOOT;  Surgeon: Teddy Huber MD;  Location: [facility] OR;  Service: Vascular;;    DEBRIDEMENT OF FOOT Left 3/9/2021    Procedure: DEBRIDEMENT, FOOT, bone biopsy;  Surgeon: Rosio Mayes DPM;  Location: [facility] OR;  Service: Podiatry;  Laterality: Left;  Request  neoxx---COVID IN AM  REQUESTING NOON START  RN Phone Pre op.On Blood thinners Plavix and Eliquis.  Covid am of surgery. C A    DEBRIDEMENT OF FOOT Left 5/4/2021    Procedure: DEBRIDEMENT, FOOT;  Surgeon: Farooq Morley DPM;  Location: [facility] OR 2ND FLR;  Service: Podiatry;  Laterality: Left;    INSERTION OF TUNNELED CENTRAL VENOUS HEMODIALYSIS CATHETER N/A 1/27/2020    Procedure: Insertion, Catheter, Central Venous, Hemodialysis;  Surgeon: ESTEBAN Gomez III, MD;  Location: [facility] CATH LAB;  Service: Peripheral Vascular;  Laterality: N/A;    PERCUTANEOUS TRANSLUMINAL ANGIOPLASTY N/A 3/4/2021    Procedure: PTA (ANGIOPLASTY, PERCUTANEOUS, TRANSLUMINAL);  Surgeon: Teddy Huber MD;  Location: [facility] OR;  Service: Vascular;  Laterality: N/A;    REMOVAL OF ARTERIOVENOUS GRAFT Left 5/27/2021    Procedure: REMOVAL, GRAFT, LEFT LOWER EXTREMITY, WOUND EXPLORATION;  Surgeon: Teddy Huber MD;  Location: [address] FLR;  Service: Vascular;  Laterality: Left;    REMOVAL OF NAIL OF DIGIT Left 3/9/2021    Procedure: REMOVAL, NAIL, DIGIT;  Surgeon: Rosio Mayes DPM;  Location: [facility] OR;  Service: Podiatry;  Laterality: Left;    THROMBECTOMY Left 3/4/2021    Procedure: THROMBECTOMY, LEFT LOWER EXTREMITY BYPASS GRAFT, ANGIOGRAM, POSSIBLE INTERVENTION, POSSIBLE LEFT LOWER EXTREMITY BYPASS;  Surgeon: Teddy Huber MD;  Location: [facility] OR;  Service: Vascular;  Laterality: Left;    THROMBECTOMY Left 4/29/2021    Procedure: GRAFT THROMBECTOMY, LEFT LOWER EXTREMITY;  Surgeon: Teddy Huber MD;  Location: [facility] 2ND FLR;  Service: Vascular;  Laterality: Left;  14.5 min  1179.85 mGy  341.01 Gycm2  240 ml dye    THROMBECTOMY  10/22/2021    Procedure: THROMBECTOMY;  Surgeon: Saad Arenas MD;  Location: [facility] CATH LAB/EP;  Service: Cardiology;;       Review of patient's allergies indicates:   Allergen Reactions    Ciprofloxacin Itching    Contrast media      Kidney injury    Iodine      Kidney injury        Current Facility-Administered Medications on File Prior to Encounter   Medication    [DISCONTINUED] GENERIC EXTERNAL MEDICATION    [DISCONTINUED] GENERIC EXTERNAL MEDICATION    [DISCONTINUED] GENERIC EXTERNAL MEDICATION    [DISCONTINUED] lactated ringers infusion     Current Outpatient Medications on File Prior to Encounter   Medication Sig    acetaminophen (TYLENOL) 500 MG tablet Take 2 tablets (1,000 mg total) by mouth every 8 (eight) hours as needed for Pain.    allopurinoL (ZYLOPRIM) 100 MG tablet Take 1 tablet (100 mg total) by mouth once daily.    apixaban (ELIQUIS) 5 mg Tab Take 1 tablet (5 mg total) by mouth 2 (two) times daily.    atorvastatin (LIPITOR) 80 MG tablet Take 1 tablet (80 mg total) by mouth every evening.    blood-glucose meter,continuous (DEXCOM G6 ) Misc Use to check blood sugars 4 times/day    blood-glucose sensor (DEXCOM G6 SENSOR) Radha Apply one sensor to skin every 10 days    blood-glucose transmitter (DEXCOM G6 TRANSMITTER) Radha Replace transmitter every 3 months to use with dexcom sensor    clopidogreL (PLAVIX) 75 mg tablet Take 1 tablet (75 mg total) by mouth once daily.    furosemide (LASIX) 40 MG tablet Take 1 tablet (40 mg total) by mouth once daily.    HYDROcodone-acetaminophen (NORCO) 5-325 mg per tablet Take 1 tablet by mouth every 4 (four) hours as needed for Pain.    insulin aspart U-100 (NOVOLOG FLEXPEN U-100 INSULIN) 100 unit/mL (3 mL) InPn pen Inject 5 Units into the skin 3 (three) times daily with meals. If not eating, ok to hold    insulin detemir U-100 (LEVEMIR FLEXTOUCH) 100 unit/mL (3 mL) SubQ InPn pen Inject 12 Units into the skin every evening.    magnesium oxide (MAG-OX) 400 mg (241.3 mg magnesium) tablet Take 1 tablet (400 mg total) by mouth 2 (two) times daily.    melatonin (MELATIN) 3 mg tablet Take 2 tablets (6 mg total) by mouth nightly as needed for Insomnia.    MEROPENEM 1 G/100 ML NS, READY TO MIX SYSTEM, Inject 100 mLs (1 g  total) into the vein every 12 (twelve) hours. for 10 days    mirtazapine (REMERON) 7.5 MG Tab Take 2 tablets (15 mg total) by mouth nightly.    multivit/folic acid/vit K1 (ONE-A-DAY WOMEN'S 50 PLUS ORAL) Take 1 tablet by mouth Daily.    oxyCODONE-acetaminophen (PERCOCET)  mg per tablet Take 1 tablet by mouth every 6 (six) hours as needed.    pregabalin (LYRICA) 50 MG capsule Take 3 capsules (150 mg total) by mouth 3 (three) times daily.    sertraline (ZOLOFT) 50 MG tablet Take 1 tablet (50 mg total) by mouth once daily.    sodium bicarbonate 650 MG tablet Take 1 tablet (650 mg total) by mouth 2 (two) times daily.    [DISCONTINUED] lancing device Misc 1 Device by Misc.(Non-Drug; Combo Route) route 2 (two) times daily with meals.     Family History     Problem Relation (Age of Onset)    Diabetes Mother, Father, Paternal Grandmother    Heart disease Maternal Grandmother    No Known Problems Maternal Grandfather, Paternal Grandfather        Tobacco Use    Smoking status: Former Smoker    Smokeless tobacco: Never Used   Substance and Sexual Activity    Alcohol use: No    Drug use: Yes     Types: Marijuana     Comment: occassional    Sexual activity: Yes     Partners: Male     Review of Systems   Constitutional: Positive for fatigue. Negative for chills and fever.   HENT: Negative for rhinorrhea and sneezing.    Respiratory: Negative for cough, shortness of breath and wheezing.    Cardiovascular: Negative for chest pain and palpitations.   Gastrointestinal: Negative for abdominal distention, abdominal pain, constipation, diarrhea, nausea and vomiting.   Genitourinary: Negative for dysuria, frequency and hematuria.   Skin: Positive for wound.   Neurological: Negative for numbness and headaches.   Psychiatric/Behavioral: Positive for agitation and confusion.     Objective:     Vital Signs (Most Recent):  Temp: 98 °F (36.7 °C) (02/01/22 1608)  Pulse: 106 (02/01/22 1908)  Resp: 18 (02/01/22 1608)  BP: (!)  112/53 (02/01/22 1608)  SpO2: (!) 92 % (02/01/22 1608) Vital Signs (24h Range):  Temp:  [98 °F (36.7 °C)-98.6 °F (37 °C)] 98 °F (36.7 °C)  Pulse:  [] 106  Resp:  [13-22] 18  SpO2:  [92 %-100 %] 92 %  BP: ()/(47-97) 112/53     Weight: 67.6 kg (149 lb 0.5 oz)  Body mass index is 24.8 kg/m².    Intake/Output Summary (Last 24 hours) at 2/1/2022 2005  Last data filed at 2/1/2022 1800  Gross per 24 hour   Intake 1542.08 ml   Output --   Net 1542.08 ml      Physical Exam  Vitals and nursing note reviewed.   Constitutional:       Appearance: Normal appearance. She is ill-appearing.   Cardiovascular:      Rate and Rhythm: Regular rhythm. Tachycardia present.      Pulses: Normal pulses.      Heart sounds: No murmur heard.  No friction rub. No gallop.    Pulmonary:      Effort: Pulmonary effort is normal. No respiratory distress.      Breath sounds: Normal breath sounds. No wheezing.   Abdominal:      General: Abdomen is flat. Bowel sounds are normal. There is no distension.      Palpations: Abdomen is soft.      Tenderness: There is no abdominal tenderness.   Musculoskeletal:         General: Tenderness and deformity present.   Skin:     General: Skin is warm and dry.      Capillary Refill: Capillary refill takes less than 2 seconds.      Findings: Erythema and lesion present.   Neurological:      General: No focal deficit present.      Mental Status: She is alert.       Results for orders placed or performed during the hospital encounter of 01/31/22 (from the past 2160 hour(s))   CT Head Without Contrast    Impression    1. No acute intracranial process.  2. Involutional changes with chronic microvascular ischemic changes.      Electronically signed by: Jeff Sotelo  Date:    01/31/2022  Time:    20:43     Hemoglobin A1C   Date Value Ref Range Status   12/03/2021 9.6 (H) 4.0 - 5.6 % Final     Comment:     ADA Screening Guidelines:  5.7-6.4%  Consistent with prediabetes  >or=6.5%  Consistent with  diabetes    High levels of fetal hemoglobin interfere with the HbA1C  assay. Heterozygous hemoglobin variants (HbS, HgC, etc)do  not significantly interfere with this assay.   However, presence of multiple variants may affect accuracy.     06/18/2021 5.9 (H) 4.0 - 5.6 % Final     Comment:     ADA Screening Guidelines:  5.7-6.4%  Consistent with prediabetes  >or=6.5%  Consistent with diabetes    High levels of fetal hemoglobin interfere with the HbA1C  assay. Heterozygous hemoglobin variants (HbS, HgC, etc)do  not significantly interfere with this assay.   However, presence of multiple variants may affect accuracy.     04/30/2021 6.1 (H) 4.0 - 5.6 % Final     Comment:     ADA Screening Guidelines:  5.7-6.4%  Consistent with prediabetes  >or=6.5%  Consistent with diabetes    High levels of fetal hemoglobin interfere with the HbA1C  assay. Heterozygous hemoglobin variants (HbS, HgC, etc)do  not significantly interfere with this assay.   However, presence of multiple variants may affect accuracy.         Significant Labs: All pertinent labs within the past 24 hours have been reviewed.    Significant Imaging: I have reviewed all pertinent imaging results/findings within the past 24 hours.    Medical Student Subjective & Objective

## 2022-02-02 NOTE — PLAN OF CARE
Reached out to [facility] to see if patient can return once medically stable for DC. Waiting on reply. Will continue to follow.    [place]ammy Will, [facility] [place]  [phone]

## 2022-02-02 NOTE — SUBJECTIVE & OBJECTIVE
Interval History: \  [facility]  General surgery following, no acute intervention  Remains stable      Review of Systems   Constitutional: Positive for fatigue. Negative for appetite change, chills, diaphoresis and fever.   Respiratory: Negative for cough and shortness of breath.    Cardiovascular: Negative for chest pain.   Gastrointestinal: Negative for abdominal pain and vomiting.   Genitourinary: Negative for dysuria.   Musculoskeletal: Negative for back pain.   Skin: Positive for wound. Negative for color change, pallor and rash.   Neurological: Positive for tremors and weakness.   Psychiatric/Behavioral: Positive for decreased concentration.     Objective:     Vital Signs (Most Recent):  Temp: 98.1 °F (36.7 °C) (02/02/22 1129)  Pulse: 107 (02/02/22 1129)  Resp: 20 (02/02/22 1129)  BP: (!) 151/70 (02/02/22 1129)  SpO2: 100 % (02/02/22 0820) Vital Signs (24h Range):  Temp:  [97 °F (36.1 °C)-98.1 °F (36.7 °C)] 98.1 °F (36.7 °C)  Pulse:  [] 107  Resp:  [16-20] 20  SpO2:  [92 %-100 %] 100 %  BP: (110-151)/(52-70) 151/70     Weight: 67.6 kg (149 lb 0.5 oz)  Body mass index is 24.8 kg/m².    Estimated Creatinine Clearance: 38.1 mL/min (A) (based on SCr of 1.5 mg/dL (H)).    Physical Exam  Vitals and nursing note reviewed.   Constitutional:       General: She is not in acute distress.     Appearance: She is well-developed. She is not diaphoretic.   HENT:      Head: Normocephalic and atraumatic.   Eyes:      Pupils: Pupils are equal, round, and reactive to light.   Cardiovascular:      Rate and Rhythm: Normal rate and regular rhythm.      Heart sounds: Normal heart sounds. No murmur heard.  No friction rub. No gallop.    Pulmonary:      Effort: Pulmonary effort is normal. No respiratory distress.      Breath sounds: Normal breath sounds. No wheezing or rales.   Chest:      Chest wall: No tenderness.   Abdominal:      General: Bowel sounds are normal. There is no distension.      Palpations: Abdomen is soft. There is  no mass.      Tenderness: There is no abdominal tenderness. There is no guarding or rebound.      Hernia: No hernia is present.   Musculoskeletal:         General: Signs of injury present. No tenderness or deformity.      Cervical back: Normal range of motion and neck supple.   Skin:     General: Skin is warm and dry.      Coloration: Skin is not pale.      Findings: No erythema.      Comments: Right bka stump well dressed  No bleeding or strick through  l aka well healed     Neurological:      Mental Status: She is alert and oriented to person, place, and time.      Cranial Nerves: No cranial nerve deficit.      Coordination: Coordination normal.   Psychiatric:         Behavior: Behavior normal.         Thought Content: Thought content normal.         Significant Labs: All pertinent labs within the past 24 hours have been reviewed.    Significant Imaging: I have reviewed all pertinent imaging results/findings within the past 24 hours.

## 2022-02-02 NOTE — PROGRESS NOTES
[facility]  Infectious Disease  Progress Note    Patient Name: Suyapa Connelly  MRN: [id number]  Admission Date: 1/31/2022  Length of Stay: 1 days  Attending Physician: Jacinto Eugene MD  Primary Care Provider: Magen Christensen MD    Isolation Status: Contact  Assessment/Plan:      S/P BKA (below knee amputation) unilateral, right  55 y/o female with DMII, HLD, CKD, PAD, L AKA 5/2021, R BKA 12/28/21 followed by stump revision 1/24/22 presents to ED from [facility] with confusion and bleeding and drainage from her R BKA stump site. She has been on IV meropenem for surgical stump site infection EOC 2/7/22     Pt remained afebrile,stable, no leukocytosis. MRI findings negative for osteomyelitis. Ill defined fluid collection near stump site. Sacral wound appears stable. Seen by general surgery - no intervention at this time. Continue local wound care      Recommendations  1. D/c vancomycin and azithromycin at this time  2. Continue meropenem [id number]  3. No acute intervention per surgery. Continue local wound care  4. Continue meropenem as anticipated EOC 2/7/22 with weekly cbc cmp esr crp sent to [facility]  Dr. Espino at [phone]        Discussed with ID staff      Thank you for your consult. I will sign off. Please contact us if you have any additional questions.    Toby Mora PA-C  Infectious Disease  [facility] S[facility]    Subjective:     Principal Problem:Severe sepsis    HPI: 55 y/o female with DMII, HLD, CKD, PAD, L AKA 5/2021 presents to ED from [facility] with confusion and bleeding and drainage from her surgical stump site. She was admitted at [facility] 12/2021 for gangrene of her right foot s/p R BKA with Dr. Rojas on 12/28. She was discharged to rehab on 1/10. She was readmitted to [facility] for infection of her R BKA stump site and underwent I&D and revision with Dr. Rojas on 1/24. No surgical cultures obtained. Prior wound cultures +ESBL Klebsiella and Pseudomonas. She  was seen by [facility] and was discharged on two week course of merpoenem, EOC 2/8/22.     Pt remained afebrile,stable, no leukocytosis. Pt is currently on vanc/meropenem/azithromycin. MRI findings negative for osteomyelitis. Ill defined fluid collection near stump site. Sacral wound appears stable. No cough on examination. Pt reports twitching and weakness.     Interval History: \  NAEON  General surgery following, no acute intervention  Remains stable      Review of Systems   Constitutional: Positive for fatigue. Negative for appetite change, chills, diaphoresis and fever.   Respiratory: Negative for cough and shortness of breath.    Cardiovascular: Negative for chest pain.   Gastrointestinal: Negative for abdominal pain and vomiting.   Genitourinary: Negative for dysuria.   Musculoskeletal: Negative for back pain.   Skin: Positive for wound. Negative for color change, pallor and rash.   Neurological: Positive for tremors and weakness.   Psychiatric/Behavioral: Positive for decreased concentration.     Objective:     Vital Signs (Most Recent):  Temp: 98.1 °F (36.7 °C) (02/02/22 1129)  Pulse: 107 (02/02/22 1129)  Resp: 20 (02/02/22 1129)  BP: (!) 151/70 (02/02/22 1129)  SpO2: 100 % (02/02/22 0820) Vital Signs (24h Range):  Temp:  [97 °F (36.1 °C)-98.1 °F (36.7 °C)] 98.1 °F (36.7 °C)  Pulse:  [] 107  Resp:  [16-20] 20  SpO2:  [92 %-100 %] 100 %  BP: (110-151)/(52-70) 151/70     Weight: 67.6 kg (149 lb 0.5 oz)  Body mass index is 24.8 kg/m².    Estimated Creatinine Clearance: 38.1 mL/min (A) (based on SCr of 1.5 mg/dL (H)).    Physical Exam  Vitals and nursing note reviewed.   Constitutional:       General: She is not in acute distress.     Appearance: She is well-developed. She is not diaphoretic.   HENT:      Head: Normocephalic and atraumatic.   Eyes:      Pupils: Pupils are equal, round, and reactive to light.   Cardiovascular:      Rate and Rhythm: Normal rate and regular rhythm.      Heart sounds: Normal  heart sounds. No murmur heard.  No friction rub. No gallop.    Pulmonary:      Effort: Pulmonary effort is normal. No respiratory distress.      Breath sounds: Normal breath sounds. No wheezing or rales.   Chest:      Chest wall: No tenderness.   Abdominal:      General: Bowel sounds are normal. There is no distension.      Palpations: Abdomen is soft. There is no mass.      Tenderness: There is no abdominal tenderness. There is no guarding or rebound.      Hernia: No hernia is present.   Musculoskeletal:         General: Signs of injury present. No tenderness or deformity.      Cervical back: Normal range of motion and neck supple.   Skin:     General: Skin is warm and dry.      Coloration: Skin is not pale.      Findings: No erythema.      Comments: Right bka stump well dressed  No bleeding or strick through  l aka well healed     Neurological:      Mental Status: She is alert and oriented to person, place, and time.      Cranial Nerves: No cranial nerve deficit.      Coordination: Coordination normal.   Psychiatric:         Behavior: Behavior normal.         Thought Content: Thought content normal.         Significant Labs: All pertinent labs within the past 24 hours have been reviewed.    Significant Imaging: I have reviewed all pertinent imaging results/findings within the past 24 hours.

## 2022-02-02 NOTE — ASSESSMENT & PLAN NOTE
Lab Results   Component Value Date    HGBA1C 9.6 (H) 12/03/2021     Plan  - Diabetic diet  - MDSSI PRN with POCT glu TIDWM + QHS  - detemir 10 QD  - increase basal and add bolus regimen per requirements

## 2022-02-02 NOTE — PROGRESS NOTES
"[facility] Medicine  Progress Note    Patient Name: Suyapa Connelly  MRN: [id number]  Patient Class: IP- Inpatient   Admission Date: 1/31/2022  Length of Stay: 0 days  Attending Physician: Jacinto Eugene MD  Primary Care Provider: Magen Christensen MD        Subjective:     Principal Problem:Severe sepsis        HPI:  Suyapa Connelly is a 57 y/o female with PMHx CKD3, DMII, GERD, HLD, HTN, osteomyelitis, PVD, depression, and anxiety presents to ED with  for concerns of increased confusion from her rehab facility.  at bedside is concern for progressively worsening appearance of RLE wound, along with continued drainage, and worsening mental status of his wife which has progressed through this week. Patient emotionally labile and responds yes or no to most questions. She reports increased pain in her R leg with nausea over the past week. She also reports constipation without abdominal pain. She denies dysuria. Patient denies fevers, chills, vomiting, chest pain, cough, and shortness of breath.     Patient underwent right BKA by Dr. Rojas on 12/28 then discharged to rehab facility on 1/10. Since then she has recurrent hospital visits for the wound, requiring revisions and debridements; the last resulted in a pseudomonas positive wound culture resistant to cefepime 1/20. [facility] RN stated that patient was "very confused this morning and more lethargic than normal." Per rehab, pt is normally AOx3 and able to follow commands. Rehab states they were concerned for sepsis due to elevated WBC and ROSLYN. Per , her last known normal was 1 weeks ago.    She was admitted to hospital medicine for severe sepsis.       Overview/Hospital Course:  Patient was admitted to hospital 1/31 for AMS and concern about worsening appearance of RLE wound. Patient was started on antibiotics (cefepime, vancomycin & azithromycin), blood, wound, resp & urine samples were collected for culture due to " concern for sepsis and ID and Gen Surg (s/p BKA on 12/28) were consulted. Pregabalin was withheld due to patient's myoclonus, likely 2/2 to ROSLYN leading to increased serum concentrations. Overnight there were JUAN. On the morning of 2/1, patient's mental status had improved slightly. Cefepime was switched to meropenem.      Interval History: NAEON. Afebrile. Tachycardic up to 108. On 2LNC for oxygen support. Patient was reporting feeling well and denied any issues except of right knee oozing. However, upon re-visit she was disoriented. Delirium precautions placed. SLP performed successful and diet started. Cefepime changed to Meropenem. Azithromycin added.     Review of Systems   Unable to perform ROS: Mental status change   Constitutional: Negative for chills and fever.   HENT: Negative for congestion and rhinorrhea.    Eyes: Negative for pain and visual disturbance.   Respiratory: Negative for chest tightness and shortness of breath.    Cardiovascular: Negative for chest pain and palpitations.   Gastrointestinal: Negative for abdominal distention, abdominal pain, constipation, diarrhea, nausea and vomiting.   Genitourinary: Negative for difficulty urinating and dysuria.   Musculoskeletal: Negative for back pain and neck pain.   Skin: Positive for wound (Right BKA surgical scar oozing). Negative for rash.   Neurological: Negative for dizziness and headaches.   Psychiatric/Behavioral: Negative for confusion and sleep disturbance.     Objective:     Vital Signs (Most Recent):  Temp: 98 °F (36.7 °C) (02/01/22 1608)  Pulse: 105 (02/01/22 1737)  Resp: 18 (02/01/22 1608)  BP: (!) 112/53 (02/01/22 1608)  SpO2: (!) 92 % (02/01/22 1608) Vital Signs (24h Range):  Temp:  [98 °F (36.7 °C)-98.6 °F (37 °C)] 98 °F (36.7 °C)  Pulse:  [] 105  Resp:  [13-22] 18  SpO2:  [92 %-100 %] 92 %  BP: ()/(47-97) 112/53     Weight: 67.6 kg (149 lb 0.5 oz)  Body mass index is 24.8 kg/m².    Intake/Output Summary (Last 24 hours) at  2/1/2022 1846  Last data filed at 2/1/2022 1333  Gross per 24 hour   Intake 2302.08 ml   Output --   Net 2302.08 ml      Physical Exam  Vitals and nursing note reviewed.   Constitutional:       General: She is not in acute distress.     Appearance: Normal appearance. She is not ill-appearing or diaphoretic.      Interventions: Nasal cannula in place.      Comments: 2LNC   HENT:      Head: Normocephalic and atraumatic.      Right Ear: External ear normal.      Left Ear: External ear normal.      Nose: Nose normal. No congestion or rhinorrhea.      Mouth/Throat:      Mouth: Mucous membranes are dry.      Pharynx: Oropharynx is clear.   Eyes:      General: No scleral icterus.     Extraocular Movements: Extraocular movements intact.      Pupils: Pupils are equal, round, and reactive to light.   Cardiovascular:      Rate and Rhythm: Regular rhythm. Tachycardia present.      Pulses: Normal pulses.      Heart sounds: Normal heart sounds.   Pulmonary:      Effort: Pulmonary effort is normal.      Breath sounds: Normal breath sounds. No wheezing, rhonchi or rales.   Abdominal:      General: Bowel sounds are normal. There is no distension.      Palpations: Abdomen is soft.      Tenderness: There is no abdominal tenderness. There is no right CVA tenderness or left CVA tenderness.   Musculoskeletal:         General: Normal range of motion.      Cervical back: Normal range of motion and neck supple. No tenderness.      Right Lower Extremity: Right leg is amputated below knee.      Left Lower Extremity: Left leg is amputated below knee.   Lymphadenopathy:      Cervical: No cervical adenopathy.   Skin:     General: Skin is warm and dry.      Findings: Lesion (Right knee bandaged ) and rash present. Rash is scaling.   Neurological:      Mental Status: She is alert. She is disoriented.   Psychiatric:         Mood and Affect: Mood normal.         Behavior: Behavior normal.         Thought Content: Thought content normal.          Judgment: Judgment normal.         Significant Labs: All pertinent labs within the past 24 hours have been reviewed.    Significant Imaging: I have reviewed all pertinent imaging results/findings within the past 24 hours.      Assessment/Plan:      * Severe sepsis  Ms. Connelly is a 57 yo female s/p r BKA and hx of Pseudomonas infection at the site of surgery presenting with AMS, leukocytosis, and elevated procalc  - likely sepsis due to wound infection/OM  - ESR and CRP slightly elevated  - Procalc of 0.38    Plan  - wound care consulted  - blood cx x2, wound cx, resp cx  - urine cx in process to r/o other source  - CBC daily to trend WCC  - meropenem given hx of Pseudomonal infection, azithro to cover possible PNA  - ID consulted, appreciate recs  -MRI Sacrum and R leg to r/o OM  - Gen Surg following given repeated wound infection s/p BKA      Myoclonus  Patient has myoclonal jerks on exam  - likely in setting of pregabalin build with ROSLYN    Plan  - holding home pregabalin  - neuro checks      ROSLYN (acute kidney injury)  HPatient with acute kidney injury likely d/t IVVD/Dehydration Which is currently worsening. Labs reviewed- Renal function/electrolytes with Estimated Creatinine Clearance: 31.8 mL/min (A) (based on SCr of 1.8 mg/dL (H)). according to latest data. Monitor urine output and serial BMP and adjust therapy as needed. Avoid nephrotoxins and renally dose meds for GFR listed above.     Plan  - strict I/Os  - renal lytes pending  - renally dose meds  - avoid nephrotoxic agents      Altered mental state  Likely in setting of infection    Plan  - neuro checks  - delirium precautions  - CT Head to r/o other causes      Decubitus ulcer of sacral region, unstageable  Wound care consulted, appreciate recs    MRI Sacrum W Out contrast (in setting of ROSLYN) ordered    S/P BKA (below knee amputation) unilateral, right  Patient had recent BKA at [facility] with recent debridement last week  - patient has active drainage  from wound with continued pain and complications    Plan  - morphine 4 mg q4 PRN for severe pain  - Wound Care consulted, appreciate recs  - Gen Surg will continue to follow and evaluate her wound for signs of infection, no surgical intervention recommended at this time.   - ID recommend to continue with meropenem    Hypoalbuminemia  Boost glucose control TIDWM     Impaired functional mobility and endurance  PT/OT consulted, appreciate recs      Type 2 diabetes mellitus with diabetic nephropathy, with long-term current use of insulin  Lab Results   Component Value Date    HGBA1C 9.6 (H) 12/03/2021     Plan  - Diabetic diet  - MDSSI PRN with POCT glu TIDWM + QHS  - detemir 10 QD  - increase basal and add bolus regimen per requirements      Acute blood loss anemia  Patient has Hgb of 6.6 on admission    Plan  - transfuse 1U pRBC  - CBC daily, transfuse Hgb < 7      Mixed hyperlipidemia  Patient on atorvastatin at home    Plan  - continue home med once MS improves      CAROLIN (generalized anxiety disorder)  Patient on melatonin, remeron, zoloft     Plan  - consider resuming as patient's MS improves      Moderate malnutrition  Boost TIDWM once MS improves         VTE Risk Mitigation (From admission, onward)         Ordered     IP VTE HIGH RISK PATIENT  Once         01/31/22 1930     Place sequential compression device  Until discontinued         01/31/22 1930                Discharge Planning   DEVAN: 2/4/2022     Code Status: Prior   Is the patient medically ready for discharge?: No    Reason for patient still in hospital (select all that apply): Patient unstable, Patient trending condition, Laboratory test, Treatment, Consult recommendations and PT / OT recommendations                     Dominik Manley MD  Department of Hospital Medicine   [facility]

## 2022-02-02 NOTE — SUBJECTIVE & OBJECTIVE
Interval History: NAEON. Afebrile. HDS. Reports feeling better this morning. Pain is controlled with current regimen. No new symptoms or concerns this morning. All questions answered.   WBC 15 > 8    Medications:  Continuous Infusions:  Scheduled Meds:   atorvastatin  80 mg Oral QHS    azithromycin  250 mg Oral Daily    insulin detemir U-100  10 Units Subcutaneous QHS    meropenem (MERREM) IVPB  2 g Intravenous Q12H    polyethylene glycol  17 g Oral Daily     PRN Meds:acetaminophen, albuterol sulfate, dextrose 50%, dextrose 50%, glucagon (human recombinant), glucose, glucose, hydrALAZINE, HYDROcodone-acetaminophen, insulin aspart U-100, melatonin, morphine, morphine, naloxone, ondansetron, sodium chloride 0.9%, Pharmacy to dose Vancomycin consult **AND** vancomycin - pharmacy to dose     Review of patient's allergies indicates:   Allergen Reactions    Ciprofloxacin Itching    Contrast media      Kidney injury    Iodine      Kidney injury     Objective:     Vital Signs (Most Recent):  Temp: 97 °F (36.1 °C) (02/02/22 0820)  Pulse: 95 (02/02/22 0820)  Resp: 18 (02/02/22 0820)  BP: (!) 115/53 (02/02/22 0820)  SpO2: 100 % (02/02/22 0820) Vital Signs (24h Range):  Temp:  [97 °F (36.1 °C)-98.4 °F (36.9 °C)] 97 °F (36.1 °C)  Pulse:  [] 95  Resp:  [16-18] 18  SpO2:  [92 %-100 %] 100 %  BP: (110-142)/(52-67) 115/53     Weight: 67.6 kg (149 lb 0.5 oz)  Body mass index is 24.8 kg/m².    Intake/Output - Last 3 Shifts       01/31 0700  02/01 0659 02/01 0700  02/02 0659 02/02 0700  02/03 0659    P.O.  240     Blood 602.1      IV Piggyback 1350 350     Total Intake(mL/kg) 1952.1 (28.9) 590 (8.7)     Net +1952.1 +590            Stool Occurrence  1 x           Physical Exam  Vitals and nursing note reviewed.   Constitutional:       General: She is not in acute distress.     Appearance: She is not diaphoretic.      Comments: Room air   HENT:      Head: Normocephalic and atraumatic.      Mouth/Throat:      Mouth: Mucous  membranes are moist.      Pharynx: Oropharynx is clear.   Eyes:      Extraocular Movements: Extraocular movements intact.      Conjunctiva/sclera: Conjunctivae normal.   Cardiovascular:      Rate and Rhythm: Normal rate.   Pulmonary:      Effort: Pulmonary effort is normal. No respiratory distress.   Musculoskeletal:         General: No deformity.      Comments: EDGAR ZAMUDIO BKA, hemostatic with old blood on dressing, mild wound dehiscence but incisions in place, no evidence of purulence or surrounding erythema    Skin:     General: Skin is warm and dry.   Neurological:      Mental Status: She is alert and oriented to person, place, and time.         Significant Labs:  I have reviewed all pertinent lab results within the past 24 hours.  CBC:   Recent Labs   Lab 02/02/22 0612   WBC 8.98   RBC 2.29*   HGB 7.1*   HCT 22.5*      MCV 98   MCH 31.0   MCHC 31.6*     CMP:   Recent Labs   Lab 02/02/22 0612   GLU 86   CALCIUM 9.4   ALBUMIN 1.8*   PROT 7.2      K 4.5   CO2 28      BUN 52*   CREATININE 1.5*   ALKPHOS 100   ALT 13   AST 29   BILITOT 0.4       Significant Diagnostics:  I have reviewed all pertinent imaging results/findings within the past 24 hours.

## 2022-02-02 NOTE — ASSESSMENT & PLAN NOTE
57 y/o female with DMII, HLD, CKD, PAD, L AKA 5/2021, R BKA 12/28/21 followed by stump revision 1/24/22 presents to ED from [facility] with confusion and bleeding and drainage from her R BKA stump site. She has been on IV meropenem for surgical stump site infection EOC 2/7/22     Pt remained afebrile,stable, no leukocytosis. MRI findings negative for osteomyelitis. Ill defined fluid collection near stump site. Sacral wound appears stable. Seen by general surgery - no intervention at this time. Continue local wound care      Recommendations  1. D/c vancomycin and azithromycin at this time  2. Continue meropenem [id number]  3. No acute intervention per surgery. Continue local wound care  4. Continue meropenem as anticipated EOC 2/7/22 with weekly cbc cmp esr crp sent to [facility]  Dr. Espino at [phone]        Discussed with ID staff

## 2022-02-02 NOTE — MEDICAL/APP STUDENT
"[facility] Surg  History & Physical    Patient Name: Suyapa Connelly  MRN: [id number]  Admission Date: 1/31/2022  Attending Physician: Jacinto Eugene MD   Primary Care Provider: Magen Christensen MD    Patient information was obtained from patient, spouse/SO and ER records.     Subjective:     Principal Problem: Severe sepsis    Chief Complaint:   Chief Complaint   Patient presents with    Fatigue     PT came from [facility] with reports of pt becoming more lethargic than normal. Pt is at baseline neuro. PT is at the rehab after Right BKA. Pt is on Vanco PICC line on the right upper arm.        HPI: Suyapa Connelly is a 55 y/o female with PMHx CKD3, DMII, GERD, HLD, HTN, osteomyelitis, PVD, depression, and anxiety presents to ED with  for concerns of increased confusion from her rehab facility.  at bedside is concern for progressively worsening appearance of RLE wound, along with continued drainage, and worsening mental status of his wife which has progressed through this week. Patient emotionally labile and responds yes or no to most questions. She reports increased pain in her R leg with nausea over the past week. She also reports constipation without abdominal pain. She denies dysuria. Patient denies fevers, chills, vomiting, chest pain, cough, and shortness of breath.     Patient underwent right BKA by Dr. Rojas on 12/28 then discharged to rehab facility on 1/10. Since then she has recurrent hospital visits for the wound, requiring revisions and debridements; the last resulted in a pseudomonas positive wound culture resistant to cefepime 1/20. [facility] RN stated that patient was "very confused this morning and more lethargic than normal." Per rehab, pt is normally AOx3 and able to follow commands. Rehab states they were concerned for sepsis due to elevated WBC and ROSLYN. Per , her last known normal was 1 weeks ago.    She was admitted to hospital medicine for severe sepsis. "       Hospital Course: Patient was admitted to hospital 1/31 for AMS and concern about worsening appearance of RLE wound. Patient was started on antibiotics (cefepime, vancomycin & azithromycin), blood, wound, resp & urine samples were collected for culture due to concern for sepsis and ID and Gen Surg (s/p BKA on 12/28) were consulted. Pregabalin was withheld due to patient's myoclonus, likely 2/2 to ROSLYN leading to increased serum concentrations. Overnight there were JUAN. On the morning of 2/1, patient's mental status had improved slightly. Cefepime was switched to meropenem.      Interval History: NAEO. Patient was AOx4 upon examination in the morning but experienced a change in MS later in the morning. Delirium precautions in place.     Past Medical History:   Diagnosis Date    Anxiety     Chronic pain syndrome     CKD (chronic kidney disease), stage III     Depression     Diabetes mellitus     type 2    Diabetes mellitus, type 2     GERD (gastroesophageal reflux disease)     Hyperemesis 3/23/2021    Hyperlipemia     Hypertension     Hypokalemia 3/23/2021    Myocardial infarction 2010    minor-caused by stress per pt.    Osteomyelitis     Osteomyelitis of left foot 4/30/2021    PVD (peripheral vascular disease)     Vaginal delivery     x1       Past Surgical History:   Procedure Laterality Date    ABOVE-KNEE AMPUTATION Left 5/18/2021    Procedure: AMPUTATION, ABOVE KNEE;  Surgeon: Teddy Huber MD;  Location: [facility] OR [address] [place] [address];  Service: Vascular;  Laterality: Left;    Angiogram - Right Extremity Right 7/9/15    angiogram-left leg  10/6/15    ANGIOGRAPHY OF LOWER EXTREMITY Left 4/29/2021    Procedure: ANGIOGRAM, LOWER EXTREMITY;  Surgeon: Teddy Huber MD;  Location: [facility] OR [address] [place] [address];  Service: Vascular;  Laterality: Left;    BELOW KNEE AMPUTATION OF LOWER EXTREMITY Right 12/28/2021    Procedure: AMPUTATION, BELOW KNEE;  Surgeon: Kaitlyn Rojas MD;  Location: [facility] OR;   Service: General;  Laterality: Right;    CATHETERIZATION OF BOTH LEFT AND RIGHT HEART N/A 12/18/2019    Procedure: CATHETERIZATION, HEART, BOTH LEFT AND RIGHT;  Surgeon: Que Fernando III, MD;  Location: [facility] CATH LAB;  Service: Cardiology;  Laterality: N/A;    CORONARY ANGIOGRAPHY N/A 12/18/2019    Procedure: ANGIOGRAM, CORONARY ARTERY;  Surgeon: Que Fernando III, MD;  Location: [facility] CATH LAB;  Service: Cardiology;  Laterality: N/A;    CORONARY ANGIOGRAPHY INCLUDING BYPASS GRAFTS WITH CATHETERIZATION OF LEFT HEART N/A 7/28/2020    Procedure: ANGIOGRAM, CORONARY, INCLUDING BYPASS GRAFT, WITH LEFT HEART CATHETERIZATION, 9 am;  Surgeon: Rachel Easley MD;  Location: [facility] CATH LAB;  Service: Cardiology;  Laterality: N/A;    CORONARY ARTERY BYPASS GRAFT (CABG) N/A 1/14/2020    Procedure: CORONARY ARTERY BYPASS GRAFT (CABG) x 1     Off Pump;  Surgeon: Huang Altamirano MD;  Location: [facility] OR [address] [place] [address];  Service: Cardiovascular;  Laterality: N/A;    CREATION OF FEMORAL-TIBIAL ARTERY BYPASS Left 4/29/2021    Procedure: CREATION, BYPASS, ARTERIAL, FEMORAL TO ANTERIOR TIBIAL;  Surgeon: Teddy Huber MD;  [facility];  Service: Vascular;  Laterality: Left;    CREATION OF FEMOROPOPLITEAL ARTERIAL BYPASS USING GRAFT Left 8/18/2020    Procedure: CREATION, BYPASS, ARTERIAL, FEMORAL TO POPLITEAL, USING GRAFT, LEFT LOWER EXTREMITY;  Surgeon: Teddy Huber MD;  Location: [facility];  Service: Vascular;  Laterality: Left;  REQUEST 7:15 A.M. START----COVID NEGATIVE ON 8/17  1ST CASE STARTE PER LEANA ON 8/7/2020 @ 942AM-LO  RN PREOP 8/12/2020   T/S-----CLEARED BY CARDS-------PENDING INSURANCE    DEBRIDEMENT OF FOOT Left 9/8/2020    Procedure: DEBRIDEMENT, FOOT;  Surgeon: Rosio Mayes DPM;  Location: [facility];  Service: Podiatry;  Laterality: Left;  request neoxx .   RN Pre Op 9-4-2020, Covid negative on 9/5/20. C A    DEBRIDEMENT OF FOOT  3/4/2021    Procedure: DEBRIDEMENT, FOOT;   Surgeon: Teddy Huber MD;  Location: [facility];  Service: Vascular;;    DEBRIDEMENT OF FOOT Left 3/9/2021    Procedure: DEBRIDEMENT, FOOT, bone biopsy;  Surgeon: Rosio Mayes DPM;  Location: [facility] OR;  Service: Podiatry;  Laterality: Left;  Request neoxx---COVID IN AM  REQUESTING NOON START  RN Phone Pre op.On Blood thinners Plavix and Eliquis.  Covid am of surgery. C A    DEBRIDEMENT OF FOOT Left 5/4/2021    Procedure: DEBRIDEMENT, FOOT;  Surgeon: Farooq Morley DPM;  [facility];  Service: Podiatry;  Laterality: Left;    INSERTION OF TUNNELED CENTRAL VENOUS HEMODIALYSIS CATHETER N/A 1/27/2020    Procedure: Insertion, Catheter, Central Venous, Hemodialysis;  Surgeon: ESTEBAN Gomez III, MD;  Location: [facility] CATH LAB;  Service: Peripheral Vascular;  Laterality: N/A;    PERCUTANEOUS TRANSLUMINAL ANGIOPLASTY N/A 3/4/2021    Procedure: PTA (ANGIOPLASTY, PERCUTANEOUS, TRANSLUMINAL);  Surgeon: Teddy Huber MD;  Location: [facility] OR;  Service: Vascular;  Laterality: N/A;    REMOVAL OF ARTERIOVENOUS GRAFT Left 5/27/2021    Procedure: REMOVAL, GRAFT, LEFT LOWER EXTREMITY, WOUND EXPLORATION;  Surgeon: Teddy Huber MD;  [facility];  Service: Vascular;  Laterality: Left;    REMOVAL OF NAIL OF DIGIT Left 3/9/2021    Procedure: REMOVAL, NAIL, DIGIT;  Surgeon: Rosio Mayes DPM;  Location: [facility] OR;  Service: Podiatry;  Laterality: Left;    THROMBECTOMY Left 3/4/2021    Procedure: THROMBECTOMY, LEFT LOWER EXTREMITY BYPASS GRAFT, ANGIOGRAM, POSSIBLE INTERVENTION, POSSIBLE LEFT LOWER EXTREMITY BYPASS;  Surgeon: Teddy Huber MD;  Location: [facility] OR;  Service: Vascular;  Laterality: Left;    THROMBECTOMY Left 4/29/2021    Procedure: GRAFT THROMBECTOMY, LEFT LOWER EXTREMITY;  Surgeon: Teddy Huber MD;  [facility];  Service: Vascular;  Laterality: Left;  14.5 min  1179.85 mGy  341.01 Gycm2  240 ml dye    THROMBECTOMY  10/22/2021     Procedure: THROMBECTOMY;  Surgeon: Saad Arenas MD;  Location: [facility] CATH LAB/EP;  Service: Cardiology;;       Review of patient's allergies indicates:   Allergen Reactions    Ciprofloxacin Itching    Contrast media      Kidney injury    Iodine      Kidney injury       Current Facility-Administered Medications on File Prior to Encounter   Medication    [DISCONTINUED] GENERIC EXTERNAL MEDICATION    [DISCONTINUED] GENERIC EXTERNAL MEDICATION    [DISCONTINUED] GENERIC EXTERNAL MEDICATION    [DISCONTINUED] lactated ringers infusion     Current Outpatient Medications on File Prior to Encounter   Medication Sig    acetaminophen (TYLENOL) 500 MG tablet Take 2 tablets (1,000 mg total) by mouth every 8 (eight) hours as needed for Pain.    allopurinoL (ZYLOPRIM) 100 MG tablet Take 1 tablet (100 mg total) by mouth once daily.    apixaban (ELIQUIS) 5 mg Tab Take 1 tablet (5 mg total) by mouth 2 (two) times daily.    atorvastatin (LIPITOR) 80 MG tablet Take 1 tablet (80 mg total) by mouth every evening.    blood-glucose meter,continuous (DEXCOM G6 ) Misc Use to check blood sugars 4 times/day    blood-glucose sensor (DEXCOM G6 SENSOR) Radha Apply one sensor to skin every 10 days    blood-glucose transmitter (DEXCOM G6 TRANSMITTER) Radha Replace transmitter every 3 months to use with dexcom sensor    clopidogreL (PLAVIX) 75 mg tablet Take 1 tablet (75 mg total) by mouth once daily.    furosemide (LASIX) 40 MG tablet Take 1 tablet (40 mg total) by mouth once daily.    HYDROcodone-acetaminophen (NORCO) 5-325 mg per tablet Take 1 tablet by mouth every 4 (four) hours as needed for Pain.    insulin aspart U-100 (NOVOLOG FLEXPEN U-100 INSULIN) 100 unit/mL (3 mL) InPn pen Inject 5 Units into the skin 3 (three) times daily with meals. If not eating, ok to hold    insulin detemir U-100 (LEVEMIR FLEXTOUCH) 100 unit/mL (3 mL) SubQ InPn pen Inject 12 Units into the skin every evening.    magnesium oxide (MAG-OX)  400 mg (241.3 mg magnesium) tablet Take 1 tablet (400 mg total) by mouth 2 (two) times daily.    melatonin (MELATIN) 3 mg tablet Take 2 tablets (6 mg total) by mouth nightly as needed for Insomnia.    MEROPENEM 1 G/100 ML NS, READY TO MIX SYSTEM, Inject 100 mLs (1 g total) into the vein every 12 (twelve) hours. for 10 days    mirtazapine (REMERON) 7.5 MG Tab Take 2 tablets (15 mg total) by mouth nightly.    multivit/folic acid/vit K1 (ONE-A-DAY WOMEN'S 50 PLUS ORAL) Take 1 tablet by mouth Daily.    oxyCODONE-acetaminophen (PERCOCET)  mg per tablet Take 1 tablet by mouth every 6 (six) hours as needed.    pregabalin (LYRICA) 50 MG capsule Take 3 capsules (150 mg total) by mouth 3 (three) times daily.    sertraline (ZOLOFT) 50 MG tablet Take 1 tablet (50 mg total) by mouth once daily.    sodium bicarbonate 650 MG tablet Take 1 tablet (650 mg total) by mouth 2 (two) times daily.    [DISCONTINUED] lancing device Misc 1 Device by Misc.(Non-Drug; Combo Route) route 2 (two) times daily with meals.     Family History     Problem Relation (Age of Onset)    Diabetes Mother, Father, Paternal Grandmother    Heart disease Maternal Grandmother    No Known Problems Maternal Grandfather, Paternal Grandfather        Tobacco Use    Smoking status: Former Smoker    Smokeless tobacco: Never Used   Substance and Sexual Activity    Alcohol use: No    Drug use: Yes     Types: Marijuana     Comment: occassional    Sexual activity: Yes     Partners: Male     Review of Systems   Constitutional: Positive for fatigue. Negative for chills and fever.   HENT: Negative for rhinorrhea and sneezing.    Respiratory: Negative for cough, shortness of breath and wheezing.    Cardiovascular: Negative for chest pain and palpitations.   Gastrointestinal: Negative for abdominal distention, abdominal pain, constipation, diarrhea, nausea and vomiting.   Genitourinary: Negative for dysuria, frequency and hematuria.   Skin: Positive for  wound.   Neurological: Negative for numbness and headaches.   Psychiatric/Behavioral: Positive for agitation and confusion.     Objective:     Vital Signs (Most Recent):  Temp: 98 °F (36.7 °C) (02/01/22 1608)  Pulse: 106 (02/01/22 1908)  Resp: 18 (02/01/22 1608)  BP: (!) 112/53 (02/01/22 1608)  SpO2: (!) 92 % (02/01/22 1608) Vital Signs (24h Range):  Temp:  [98 °F (36.7 °C)-98.6 °F (37 °C)] 98 °F (36.7 °C)  Pulse:  [] 106  Resp:  [13-22] 18  SpO2:  [92 %-100 %] 92 %  BP: ()/(47-97) 112/53     Weight: 67.6 kg (149 lb 0.5 oz)  Body mass index is 24.8 kg/m².    Intake/Output Summary (Last 24 hours) at 2/1/2022 2005  Last data filed at 2/1/2022 1800  Gross per 24 hour   Intake 1542.08 ml   Output --   Net 1542.08 ml      Physical Exam  Vitals and nursing note reviewed.   Constitutional:       Appearance: Normal appearance. She is ill-appearing.    Cardiovascular:      Rate and Rhythm: Regular rhythm. Tachycardia present.      Pulses: Normal pulses.      Heart sounds: No murmur heard.  No friction rub. No gallop.    Pulmonary:      Effort: Pulmonary effort is normal. No respiratory distress.      Breath sounds: Normal breath sounds. No wheezing.   Abdominal:      General: Abdomen is flat. Bowel sounds are normal. There is no distension.      Palpations: Abdomen is soft.      Tenderness: There is no abdominal tenderness.   Musculoskeletal:         General: Tenderness and deformity present.   Skin:     General: Skin is warm and dry.      Capillary Refill: Capillary refill takes less than 2 seconds.      Findings: Erythema and lesion present.   Neurological:      General: No focal deficit present.      Mental Status: She is alert.       Results for orders placed or performed during the hospital encounter of 01/31/22 (from the past 2160 hour(s))   CT Head Without Contrast    Impression    1. No acute intracranial process.  2. Involutional changes with chronic microvascular ischemic changes.      Electronically  "signed by: Jeff Sotelo  Date:    01/31/2022  Time:    20:43     Hemoglobin A1C   Date Value Ref Range Status   12/03/2021 9.6 (H) 4.0 - 5.6 % Final     Comment:     ADA Screening Guidelines:  5.7-6.4%  Consistent with prediabetes  >or=6.5%  Consistent with diabetes    High levels of fetal hemoglobin interfere with the HbA1C  assay. Heterozygous hemoglobin variants (HbS, HgC, etc)do  not significantly interfere with this assay.   However, presence of multiple variants may affect accuracy.     06/18/2021 5.9 (H) 4.0 - 5.6 % Final     Comment:     ADA Screening Guidelines:  5.7-6.4%  Consistent with prediabetes  >or=6.5%  Consistent with diabetes    High levels of fetal hemoglobin interfere with the HbA1C  assay. Heterozygous hemoglobin variants (HbS, HgC, etc)do  not significantly interfere with this assay.   However, presence of multiple variants may affect accuracy.     04/30/2021 6.1 (H) 4.0 - 5.6 % Final     Comment:     ADA Screening Guidelines:  5.7-6.4%  Consistent with prediabetes  >or=6.5%  Consistent with diabetes    High levels of fetal hemoglobin interfere with the HbA1C  assay. Heterozygous hemoglobin variants (HbS, HgC, etc)do  not significantly interfere with this assay.   However, presence of multiple variants may affect accuracy.         Significant Labs: All pertinent labs within the past 24 hours have been reviewed.    Significant Imaging: I have reviewed all pertinent imaging results/findings within the past 24 hours.        Assessment/Plan:     Severe sepsis   Elevated WBC, AMS, recent hx of RLE wound infxn (1/11)  - continue vancomycin, meropenem & azithromycin  - f/u wound, resp & urine cx  - ID consulted  - Gen surg consulted    Altered mental state   Potentially 2/2 to sepsis; pt more alert after administration of abx  Ddx overdose of pregabalin 2/2 to ROSLYN (where clinical improvement could be attributed to holding pregabalin)  CT head shows "no acute intracranial process"  - delirium " precautions    ROSLYN (acute kidney injury)   Increased BUN & Cr, likely 2/2 to dehydration  - strict I/Os  - serial BMPs  - avoid nephrotoxins  - renally dose all medications    Myoclonus  Likely 2/2 to increased serum levels of pregabalin due to ROSLYN  - continue holding pregabalin    S/P BKA, right   Surgery 12/28/21  Complicated by wound infection  - PRN morphine 4mg for pain  - wound care consulted  - gen surg consulted    Acute blood loss anemia   S/p tfx 1u PRBC (Hb = 6.6 on admission)  - transfuse goal >7 Hb    Type 2 diabetes mellitus with diabetic nephropathy, with long-term current use of insulin   - diabetic diet  - insulin detemir   - SSI with POCT glucose TIDWM & QHS    CAROLIN (generalized anxiety disorder)   Hold medication during acute episode of AMS  - consider restarting home medication as MS improves    Mixed hyperlipidemia   - continue home medication (atorvastatin)    Moderate malnutrition  Likely 2/2 to chronic illness  - consult nutrition    Impaired functional mobility and endurance   immobility 2/2 to s/p right BKA commplicated by sacral wound  - PT/OT consulted    Decubitus ulcer of sacral region, unstageable   immobility 2/2 to s/p right BKA commplicated by sacral wound  - wound care consulted            Active Diagnoses:    Diagnosis Date Noted POA    PRINCIPAL PROBLEM:  Severe sepsis [A41.9, R65.20] 01/17/2022 Yes    Altered mental state [R41.82] 01/31/2022 Yes    ROSLYN (acute kidney injury) [N17.9] 01/31/2022 Yes    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] 01/31/2022 Yes    Decubitus ulcer of sacral region, unstageable [L89.150] 01/18/2022 Yes    S/P BKA (below knee amputation) unilateral, right [Z89.511] 12/30/2021 Not Applicable    Hypoalbuminemia [E88.09] 06/19/2021 Yes    Impaired functional mobility and endurance [Z74.09] 05/06/2021 Yes    Type 2 diabetes mellitus with diabetic nephropathy, with long-term current use of insulin [E11.21, Z79.4] 02/24/2021 Not Applicable    Acute blood loss anemia [D62]  01/27/2020 Yes    Mixed hyperlipidemia [E78.2] 12/13/2019 Yes     Chronic    CAROLIN (generalized anxiety disorder) [F41.1] 05/07/2018 Yes     Chronic    Moderate malnutrition [E44.0] 05/05/2018 Yes      Problems Resolved During this Admission:    Diagnosis Date Noted Date Resolved POA    Amputation above knee [S78.119A] 06/08/2021 01/31/2022 Yes    Essential hypertension [I10] 05/05/2018 01/31/2022 Yes     Chronic     VTE Risk Mitigation (From admission, onward)         Ordered     IP VTE HIGH RISK PATIENT  Once         01/31/22 1930     Place sequential compression device  Until discontinued         01/31/22 1930                  Delmy Morales [facility] - Med Surg

## 2022-02-02 NOTE — PROGRESS NOTES
Pharmacokinetic Assessment Follow Up: IV Vancomycin    Vancomycin serum concentration assessment(s):    Vancomycin concentration = 21.3 mcg/mL. Roughly a 15 hour level. ROSLYN improving.    Vancomycin Regimen Plan:  1. Vancomycin 750 mg IV x 1 today at 1500  2. Obtain concentration @ 1400 on 2/3/22  3. Goal trough = 15-20 mcg/mL    Thank you for the consult, will continue to follow  Trell DiasD., BCPS  [id number]       Patient brief summary:  Suyapa Connelly is a 56 y.o. female initiated on antimicrobial therapy with IV Vancomycin for treatment of sepsis 2/2 BKA wound    Drug levels (last 3 results):  Recent Labs   Lab Result Units 02/01/22  1000 02/02/22  0612   Vancomycin, Random ug/mL 11.3 21.3     Drug Allergies:   Review of patient's allergies indicates:   Allergen Reactions    Ciprofloxacin Itching    Contrast media      Kidney injury    Iodine      Kidney injury       Actual Body Weight:   67.6 kg    Renal Function:   Estimated Creatinine Clearance: 38.1 mL/min (A) (based on SCr of 1.5 mg/dL (H)).,       CBC (last 72 hours):  Recent Labs   Lab Result Units 01/31/22  1613 02/01/22  0428 02/02/22  0612   WBC K/uL 14.72* 15.00* 8.98   Hemoglobin g/dL 6.6* 7.1* 7.1*   Hematocrit % 22.2* 23.3* 22.5*   Platelets K/uL 386 382 408   Gran % % 78.6* 77.0* 65.6   Lymph % % 11.7* 12.9* 19.8   Mono % % 7.9 7.5 8.9   Eosinophil % % 1.0 1.6 4.3   Basophil % % 0.5 0.7 1.1   Differential Method  Automated Automated Automated       Metabolic Panel (last 72 hours):  Recent Labs   Lab Result Units 01/31/22  1613 01/31/22  1751 01/31/22  2126 02/01/22  0039 02/01/22  0428 02/02/22  0612   Sodium mmol/L 139  --  139  --  139 137   Sodium, Urine mmol/L  --  69  --   --   --   --    Potassium mmol/L 5.4*  --  4.9 5.1 4.7 4.5   Chloride mmol/L 100  --  103  --  100 100   CO2 mmol/L 26  --  24  --  28 28   Glucose mg/dL 79  --  86  --  93 86   Glucose, UA   --  1+*  --   --   --   --    BUN mg/dL 64*  --  62*  --  58* 52*    Creatinine mg/dL 2.1*  --  1.9*  --  1.8* 1.5*   Creatinine, Urine mg/dL  --  63.0  --   --   --   --    Albumin g/dL 1.9*  --   --   --  1.9* 1.8*   Total Bilirubin mg/dL 0.3  --   --   --  0.6 0.4   Alkaline Phosphatase U/L 117  --   --   --  111 100   AST U/L 33  --   --   --  26 29   ALT U/L 15  --   --   --  14 13   Magnesium mg/dL 2.7*  --   --   --  2.7* 2.6   Phosphorus mg/dL 7.1*  --   --   --  6.2* 4.9*       Vancomycin Administrations:  vancomycin given in the last 96 hours      No antibiotic orders with administrations found.                Microbiologic Results:  Microbiology Results (last 7 days)     Procedure Component Value Units Date/Time    Aerobic culture [[id number]] Collected: 01/31/22 2145    Order Status: Completed Specimen: Wound from Leg, Left Updated: 02/02/22 0747     Aerobic Bacterial Culture No significant isolate to date    Narrative:      R BKA    Urine culture [[id number]] Collected: 01/31/22 1751    Order Status: Completed Specimen: Urine Updated: 02/02/22 0202     Urine Culture, Routine No significant growth    Narrative:      ADD ON UNAR #[id number]; [facility] #[id number]; [facility]CRER #[id number] PER MELVIN CHERY MD 01/31/2022  22:50     Blood culture x two cultures. Draw prior to antibiotics. [[id number]] Collected: 01/31/22 1613    Order Status: Completed Specimen: Blood from Peripheral, Wrist, Right Updated: 02/01/22 1812     Blood Culture, Routine No Growth to date      No Growth to date    Narrative:      Aerobic and anaerobic    Blood culture x two cultures. Draw prior to antibiotics. [[id number]] Collected: 01/31/22 1614    Order Status: Completed Specimen: Blood from Peripheral, Hand, Right Updated: 02/01/22 1812     Blood Culture, Routine No Growth to date      No Growth to date    Narrative:      Aerobic and anaerobic    Culture, Respiratory with Gram Stain [[id number]]     Order Status: No result Specimen: Respiratory

## 2022-02-02 NOTE — ASSESSMENT & PLAN NOTE
56 y.o. female with PMHx of CKD III, DM, GERD, HLD, HTN, PAD with extensive history of vascular interventions ultimately with L AKA in May 2021 and now R BKA in December 2021 with stump revision on 1/24/22. General surgery consulted to evaluate wound as source of elevated WBC.     - No evidence of purulent drainage or surrounding erythema. Stump is mild to moderately swollen with oozing noted on exam. MRI of R stump shows edema and small fluid collection (can be expected with Pt on POD8) adjacent to tibia, negative for osteomyelitis. We will continue to follow and evaluate her wound for signs of infection, no surgical intervention recommended at this time.   - Continue local wound care  - Remainder of care per primary team  - Please contact general surgery with any questions, concerns, or clinical status changes

## 2022-02-02 NOTE — PLAN OF CARE
Problem: Adult Inpatient Plan of Care  Goal: Plan of Care Review  Outcome: Ongoing, Progressing  Goal: Patient-Specific Goal (Individualized)  Outcome: Ongoing, Progressing  Goal: Absence of Hospital-Acquired Illness or Injury  Outcome: Ongoing, Progressing  Goal: Optimal Comfort and Wellbeing  Outcome: Ongoing, Progressing  Goal: Readiness for Transition of Care  Outcome: Ongoing, Progressing     AAOx3. VS stable. No falls or injuries. Bed low, wheels locked, side rails up x2, call light within reach. Family at bedside. Will continue to monitor.

## 2022-02-02 NOTE — PLAN OF CARE
[facility] - Med Surg  Initial Discharge Assessment       Primary Care Provider: Magen Christensen MD    Admission Diagnosis: Hyperkalemia [E87.5]  Altered mental status [R41.82]  ROSLYN (acute kidney injury) [N17.9]  Sepsis with encephalopathy without septic shock, due to unspecified organism [A41.9, R65.20, G93.40]    Admission Date: 1/31/2022  Expected Discharge Date: 2/4/2022    Discharge Barriers Identified: None    Payor: HU[employer] MANAGED MEDICARE / Plan: [employer] MEDICARE HMO / Product Type: Capitation /     Extended Emergency Contact Information  Primary Emergency Contact: Randell Connelly  Address: [address] [place] [address]  Home Phone: [phone]  Relation: Spouse  Secondary Emergency Contact: Cornelia Connelly  Address: [address] [place] [address]  Home Phone: [phone]  Relation: Daughter  Mother: Tea Sal   [place]  Mobile Phone: [phone]    Discharge Plan A: Home Health  Discharge Plan B: Home with family      [id number] [place], [place] - [facility] [address] [facility] AT [facility] [address] [place] [address] [address]  [facility] [address]  Phone: [phone] Fax: [phone]      Initial Assessment (most recent)     Adult Discharge Assessment - 02/02/22 1137        Discharge Assessment    Assessment Type Discharge Planning Assessment     Confirmed/corrected address, phone number and insurance Yes     Confirmed Demographics Correct on Facesheet   Per patient, changed address from [address] [place] [address], to [address] [place] [id number]    Source of Information patient     Does patient/caregiver understand observation status Yes     Communicated DEVAN with patient/caregiver Date not available/Unable to determine     Reason For Admission Severe sepsis     Lives With spouse     Facility Arrived From: Home     Do you expect to return to your current living  situation? Yes     Do you have help at home or someone to help you manage your care at home? Yes     Who are your caregiver(s) and their phone number(s)? Randell Connelly (spouse) [phone]     Prior to hospitilization cognitive status: Alert/Oriented     Current cognitive status: Alert/Oriented     Walking or Climbing Stairs Difficulty ambulation difficulty, requires equipment     Mobility Management Patient has BKA and utilizes wheelchair     Dressing/Bathing Difficulty bathing difficulty, assistance 1 person;dressing difficulty, assistance 1 person     Dressing/Bathing Management Spouse assists in bathing and dressing     Home Accessibility wheelchair accessible     Equipment Currently Used at Home wheelchair     Readmission within 30 days? Yes     Patient currently being followed by outpatient case management? No     Do you currently have service(s) that help you manage your care at home? No     Do you take prescription medications? Yes     Do you have prescription coverage? Yes     Coverage [employer] Managed Medicare     Do you have any problems affording any of your prescribed medications? No     Is the patient taking medications as prescribed? yes     Who is going to help you get home at discharge? Randell Connelly (spouse) [phone]     How do you get to doctors appointments? family or friend will provide     Are you on dialysis? No     Do you take coumadin? No     Discharge Plan A Home Health     Discharge Plan B Home with family     DME Needed Upon Discharge  other (see comments)   TBD    Discharge Plan discussed with: Patient     Discharge Barriers Identified None        Relationship/Environment    Name(s) of Who Lives With Patient Randell Connelly (spouse) [phone]                  SW met with patient in room for Discharge Planning Assessment.  Patient was able to answer questions.  Per patient, she lives with Randell Connelly (spouse) [phone] in a single [place] residence.   Per patient, she was  semi-independent with ADLS and used wheelchair and spouse for ambulation.  Patient will have assistance from Randell Connelly (spouse) [phone] upon discharge.  All questions addressed.  Assigned SW/CM will follow for needs.Patient's address changed to [address] [place] [address]. Patient came from [facility].    Tammy Will, [facility]  [phone]

## 2022-02-02 NOTE — PLAN OF CARE
Problem: Adult Inpatient Plan of Care  Goal: Plan of Care Review  Outcome: Ongoing, Progressing     Problem: Adult Inpatient Plan of Care  Goal: Patient-Specific Goal (Individualized)  Outcome: Ongoing, Progressing     Problem: Adult Inpatient Plan of Care  Goal: Absence of Hospital-Acquired Illness or Injury  Outcome: Ongoing, Progressing     Problem: Adult Inpatient Plan of Care  Goal: Optimal Comfort and Wellbeing  Outcome: Ongoing, Progressing     Problem: Diabetes Comorbidity  Goal: Blood Glucose Level Within Targeted Range  Outcome: Ongoing, Progressing     Problem: Adjustment to Illness (Sepsis/Septic Shock)  Goal: Optimal Coping  Outcome: Ongoing, Progressing

## 2022-02-02 NOTE — ASSESSMENT & PLAN NOTE
Patient had recent BKA at [facility] with recent debridement last week  - patient has active drainage from wound with continued pain and complications    Plan  - morphine 4 mg q4 PRN for severe pain  - Wound Care consulted, appreciate recs  - Gen Surg will continue to follow and evaluate her wound for signs of infection, no surgical intervention recommended at this time.   - ID recommend to continue with meropenem

## 2022-02-03 LAB
ALBUMIN SERPL BCP-MCNC: 1.6 G/DL (ref 3.5–5.2)
ALP SERPL-CCNC: 100 U/L (ref 55–135)
ALT SERPL W/O P-5'-P-CCNC: 17 U/L (ref 10–44)
ANION GAP SERPL CALC-SCNC: 7 MMOL/L (ref 8–16)
AST SERPL-CCNC: 32 U/L (ref 10–40)
BACTERIA SPEC AEROBE CULT: NORMAL
BASOPHILS # BLD AUTO: 0.08 K/UL (ref 0–0.2)
BASOPHILS # BLD AUTO: 0.1 K/UL (ref 0–0.2)
BASOPHILS NFR BLD: 1.1 % (ref 0–1.9)
BASOPHILS NFR BLD: 1.1 % (ref 0–1.9)
BILIRUB SERPL-MCNC: 0.3 MG/DL (ref 0.1–1)
BUN SERPL-MCNC: 51 MG/DL (ref 6–20)
CALCIUM SERPL-MCNC: 9 MG/DL (ref 8.7–10.5)
CHLORIDE SERPL-SCNC: 100 MMOL/L (ref 95–110)
CO2 SERPL-SCNC: 30 MMOL/L (ref 23–29)
CREAT SERPL-MCNC: 1.5 MG/DL (ref 0.5–1.4)
CRP SERPL-MCNC: 83.7 MG/L (ref 0–8.2)
DIFFERENTIAL METHOD: ABNORMAL
DIFFERENTIAL METHOD: ABNORMAL
EOSINOPHIL # BLD AUTO: 0.4 K/UL (ref 0–0.5)
EOSINOPHIL # BLD AUTO: 0.4 K/UL (ref 0–0.5)
EOSINOPHIL NFR BLD: 4.4 % (ref 0–8)
EOSINOPHIL NFR BLD: 4.7 % (ref 0–8)
ERYTHROCYTE [DISTWIDTH] IN BLOOD BY AUTOMATED COUNT: 13.5 % (ref 11.5–14.5)
ERYTHROCYTE [DISTWIDTH] IN BLOOD BY AUTOMATED COUNT: 13.6 % (ref 11.5–14.5)
ERYTHROCYTE [SEDIMENTATION RATE] IN BLOOD BY WESTERGREN METHOD: 32 MM/HR (ref 0–36)
EST. GFR  (AFRICAN AMERICAN): 44.9 ML/MIN/1.73 M^2
EST. GFR  (NON AFRICAN AMERICAN): 38.9 ML/MIN/1.73 M^2
GLUCOSE SERPL-MCNC: 94 MG/DL (ref 70–110)
HCT VFR BLD AUTO: 20.8 % (ref 37–48.5)
HCT VFR BLD AUTO: 22.5 % (ref 37–48.5)
HGB BLD-MCNC: 6.4 G/DL (ref 12–16)
HGB BLD-MCNC: 7.4 G/DL (ref 12–16)
IMM GRANULOCYTES # BLD AUTO: 0.02 K/UL (ref 0–0.04)
IMM GRANULOCYTES # BLD AUTO: 0.13 K/UL (ref 0–0.04)
IMM GRANULOCYTES NFR BLD AUTO: 0.3 % (ref 0–0.5)
IMM GRANULOCYTES NFR BLD AUTO: 1.4 % (ref 0–0.5)
LYMPHOCYTES # BLD AUTO: 1.8 K/UL (ref 1–4.8)
LYMPHOCYTES # BLD AUTO: 2.2 K/UL (ref 1–4.8)
LYMPHOCYTES NFR BLD: 23.7 % (ref 18–48)
LYMPHOCYTES NFR BLD: 24.3 % (ref 18–48)
MAGNESIUM SERPL-MCNC: 2.4 MG/DL (ref 1.6–2.6)
MCH RBC QN AUTO: 29.9 PG (ref 27–31)
MCH RBC QN AUTO: 30.3 PG (ref 27–31)
MCHC RBC AUTO-ENTMCNC: 30.8 G/DL (ref 32–36)
MCHC RBC AUTO-ENTMCNC: 32.9 G/DL (ref 32–36)
MCV RBC AUTO: 92 FL (ref 82–98)
MCV RBC AUTO: 97 FL (ref 82–98)
MONOCYTES # BLD AUTO: 0.8 K/UL (ref 0.3–1)
MONOCYTES # BLD AUTO: 0.8 K/UL (ref 0.3–1)
MONOCYTES NFR BLD: 10.3 % (ref 4–15)
MONOCYTES NFR BLD: 8.6 % (ref 4–15)
NEUTROPHILS # BLD AUTO: 4.4 K/UL (ref 1.8–7.7)
NEUTROPHILS # BLD AUTO: 5.7 K/UL (ref 1.8–7.7)
NEUTROPHILS NFR BLD: 59.3 % (ref 38–73)
NEUTROPHILS NFR BLD: 60.8 % (ref 38–73)
NRBC BLD-RTO: 0 /100 WBC
NRBC BLD-RTO: 0 /100 WBC
PHOSPHATE SERPL-MCNC: 4.9 MG/DL (ref 2.7–4.5)
PLATELET # BLD AUTO: 391 K/UL (ref 150–450)
PLATELET # BLD AUTO: 442 K/UL (ref 150–450)
PMV BLD AUTO: 11.8 FL (ref 9.2–12.9)
PMV BLD AUTO: 12.2 FL (ref 9.2–12.9)
POCT GLUCOSE: 134 MG/DL (ref 70–110)
POCT GLUCOSE: 142 MG/DL (ref 70–110)
POCT GLUCOSE: 146 MG/DL (ref 70–110)
POCT GLUCOSE: 96 MG/DL (ref 70–110)
POTASSIUM SERPL-SCNC: 4.6 MMOL/L (ref 3.5–5.1)
PREALB SERPL-MCNC: 10 MG/DL (ref 20–43)
PROT SERPL-MCNC: 6.8 G/DL (ref 6–8.4)
RBC # BLD AUTO: 2.14 M/UL (ref 4–5.4)
RBC # BLD AUTO: 2.44 M/UL (ref 4–5.4)
SODIUM SERPL-SCNC: 137 MMOL/L (ref 136–145)
WBC # BLD AUTO: 7.44 K/UL (ref 3.9–12.7)
WBC # BLD AUTO: 9.4 K/UL (ref 3.9–12.7)

## 2022-02-03 PROCEDURE — 36415 COLL VENOUS BLD VENIPUNCTURE: CPT | Mod: HCNC

## 2022-02-03 PROCEDURE — 99233 PR SUBSEQUENT HOSPITAL CARE,LEVL III: ICD-10-PCS | Mod: HCNC,GC,, | Performed by: STUDENT IN AN ORGANIZED HEALTH CARE EDUCATION/TRAINING PROGRAM

## 2022-02-03 PROCEDURE — 85652 RBC SED RATE AUTOMATED: CPT | Mod: HCNC | Performed by: STUDENT IN AN ORGANIZED HEALTH CARE EDUCATION/TRAINING PROGRAM

## 2022-02-03 PROCEDURE — 27000207 HC ISOLATION: Mod: HCNC

## 2022-02-03 PROCEDURE — 99233 PR SUBSEQUENT HOSPITAL CARE,LEVL III: ICD-10-PCS | Mod: HCNC,,, | Performed by: HOSPITALIST

## 2022-02-03 PROCEDURE — 25000003 PHARM REV CODE 250: Mod: HCNC | Performed by: STUDENT IN AN ORGANIZED HEALTH CARE EDUCATION/TRAINING PROGRAM

## 2022-02-03 PROCEDURE — 97530 THERAPEUTIC ACTIVITIES: CPT | Mod: HCNC

## 2022-02-03 PROCEDURE — 83735 ASSAY OF MAGNESIUM: CPT | Mod: HCNC | Performed by: STUDENT IN AN ORGANIZED HEALTH CARE EDUCATION/TRAINING PROGRAM

## 2022-02-03 PROCEDURE — 63600175 PHARM REV CODE 636 W HCPCS: Mod: HCNC | Performed by: STUDENT IN AN ORGANIZED HEALTH CARE EDUCATION/TRAINING PROGRAM

## 2022-02-03 PROCEDURE — 97535 SELF CARE MNGMENT TRAINING: CPT | Mod: HCNC

## 2022-02-03 PROCEDURE — 86140 C-REACTIVE PROTEIN: CPT | Mod: HCNC | Performed by: STUDENT IN AN ORGANIZED HEALTH CARE EDUCATION/TRAINING PROGRAM

## 2022-02-03 PROCEDURE — 11000001 HC ACUTE MED/SURG PRIVATE ROOM: Mod: HCNC

## 2022-02-03 PROCEDURE — 80053 COMPREHEN METABOLIC PANEL: CPT | Mod: HCNC | Performed by: STUDENT IN AN ORGANIZED HEALTH CARE EDUCATION/TRAINING PROGRAM

## 2022-02-03 PROCEDURE — 85025 COMPLETE CBC W/AUTO DIFF WBC: CPT | Mod: 91,HCNC | Performed by: STUDENT IN AN ORGANIZED HEALTH CARE EDUCATION/TRAINING PROGRAM

## 2022-02-03 PROCEDURE — 97161 PT EVAL LOW COMPLEX 20 MIN: CPT | Mod: HCNC

## 2022-02-03 PROCEDURE — 97165 OT EVAL LOW COMPLEX 30 MIN: CPT | Mod: HCNC

## 2022-02-03 PROCEDURE — 84134 ASSAY OF PREALBUMIN: CPT | Mod: HCNC

## 2022-02-03 PROCEDURE — 99233 SBSQ HOSP IP/OBS HIGH 50: CPT | Mod: HCNC,GC,, | Performed by: STUDENT IN AN ORGANIZED HEALTH CARE EDUCATION/TRAINING PROGRAM

## 2022-02-03 PROCEDURE — 36415 COLL VENOUS BLD VENIPUNCTURE: CPT | Mod: HCNC | Performed by: STUDENT IN AN ORGANIZED HEALTH CARE EDUCATION/TRAINING PROGRAM

## 2022-02-03 PROCEDURE — 99233 SBSQ HOSP IP/OBS HIGH 50: CPT | Mod: HCNC,,, | Performed by: HOSPITALIST

## 2022-02-03 PROCEDURE — 85025 COMPLETE CBC W/AUTO DIFF WBC: CPT | Mod: HCNC | Performed by: STUDENT IN AN ORGANIZED HEALTH CARE EDUCATION/TRAINING PROGRAM

## 2022-02-03 PROCEDURE — 84100 ASSAY OF PHOSPHORUS: CPT | Mod: HCNC | Performed by: STUDENT IN AN ORGANIZED HEALTH CARE EDUCATION/TRAINING PROGRAM

## 2022-02-03 RX ORDER — ONDANSETRON 8 MG/1
8 TABLET, ORALLY DISINTEGRATING ORAL EVERY 8 HOURS PRN
Qty: 30 TABLET | Refills: 0 | Status: SHIPPED | OUTPATIENT
Start: 2022-02-03 | End: 2023-04-21

## 2022-02-03 RX ORDER — FUROSEMIDE 40 MG/1
40 TABLET ORAL DAILY
Status: ON HOLD
Start: 2022-02-03 | End: 2022-03-27 | Stop reason: HOSPADM

## 2022-02-03 RX ORDER — SODIUM BICARBONATE 650 MG/1
650 TABLET ORAL 2 TIMES DAILY
Qty: 60 TABLET | Refills: 11 | Status: ON HOLD
Start: 2022-02-03 | End: 2022-08-18 | Stop reason: HOSPADM

## 2022-02-03 RX ORDER — MIRTAZAPINE 15 MG/1
15 TABLET, FILM COATED ORAL NIGHTLY
Status: DISCONTINUED | OUTPATIENT
Start: 2022-02-03 | End: 2022-02-03

## 2022-02-03 RX ORDER — PREGABALIN 75 MG/1
75 CAPSULE ORAL 2 TIMES DAILY
Qty: 60 CAPSULE | Refills: 2 | Status: SHIPPED | OUTPATIENT
Start: 2022-02-03 | End: 2022-02-07

## 2022-02-03 RX ORDER — CLOPIDOGREL BISULFATE 75 MG/1
75 TABLET ORAL DAILY
Status: DISCONTINUED | OUTPATIENT
Start: 2022-02-03 | End: 2022-02-04

## 2022-02-03 RX ORDER — MIRTAZAPINE 7.5 MG/1
7.5 TABLET, FILM COATED ORAL NIGHTLY
Status: DISCONTINUED | OUTPATIENT
Start: 2022-02-03 | End: 2022-02-04

## 2022-02-03 RX ADMIN — PREGABALIN 75 MG: 75 CAPSULE ORAL at 09:02

## 2022-02-03 RX ADMIN — MORPHINE SULFATE 2 MG: 2 INJECTION, SOLUTION INTRAMUSCULAR; INTRAVENOUS at 09:02

## 2022-02-03 RX ADMIN — SENNOSIDES 8.6 MG: 8.6 TABLET ORAL at 10:02

## 2022-02-03 RX ADMIN — ATORVASTATIN CALCIUM 80 MG: 40 TABLET, FILM COATED ORAL at 09:02

## 2022-02-03 RX ADMIN — MORPHINE SULFATE 2 MG: 2 INJECTION, SOLUTION INTRAMUSCULAR; INTRAVENOUS at 10:02

## 2022-02-03 RX ADMIN — MEROPENEM 2 G: 1 INJECTION INTRAVENOUS at 12:02

## 2022-02-03 RX ADMIN — CLOPIDOGREL 75 MG: 75 TABLET, FILM COATED ORAL at 01:02

## 2022-02-03 RX ADMIN — SERTRALINE HYDROCHLORIDE 50 MG: 50 TABLET ORAL at 10:02

## 2022-02-03 RX ADMIN — APIXABAN 5 MG: 5 TABLET, FILM COATED ORAL at 01:02

## 2022-02-03 RX ADMIN — INSULIN DETEMIR 10 UNITS: 100 INJECTION, SOLUTION SUBCUTANEOUS at 09:02

## 2022-02-03 RX ADMIN — INSULIN ASPART 5 UNITS: 100 INJECTION, SOLUTION INTRAVENOUS; SUBCUTANEOUS at 04:02

## 2022-02-03 RX ADMIN — INSULIN ASPART 5 UNITS: 100 INJECTION, SOLUTION INTRAVENOUS; SUBCUTANEOUS at 08:02

## 2022-02-03 RX ADMIN — APIXABAN 5 MG: 5 TABLET, FILM COATED ORAL at 09:02

## 2022-02-03 RX ADMIN — MIRTAZAPINE 7.5 MG: 7.5 TABLET, FILM COATED ORAL at 09:02

## 2022-02-03 RX ADMIN — HYDROCODONE BITARTRATE AND ACETAMINOPHEN 1 TABLET: 5; 325 TABLET ORAL at 03:02

## 2022-02-03 RX ADMIN — PREGABALIN 75 MG: 75 CAPSULE ORAL at 10:02

## 2022-02-03 RX ADMIN — MORPHINE SULFATE 2 MG: 2 INJECTION, SOLUTION INTRAMUSCULAR; INTRAVENOUS at 02:02

## 2022-02-03 RX ADMIN — HEPARIN SODIUM 5000 UNITS: 5000 INJECTION INTRAVENOUS; SUBCUTANEOUS at 06:02

## 2022-02-03 RX ADMIN — HYDROCODONE BITARTRATE AND ACETAMINOPHEN 1 TABLET: 5; 325 TABLET ORAL at 06:02

## 2022-02-03 RX ADMIN — INSULIN ASPART 5 UNITS: 100 INJECTION, SOLUTION INTRAVENOUS; SUBCUTANEOUS at 11:02

## 2022-02-03 NOTE — ASSESSMENT & PLAN NOTE
Wound care consulted, appreciate recs    MRI Sacrum W Out contrast (in setting of ROSLYN) without signs of osteomyelitis.

## 2022-02-03 NOTE — ASSESSMENT & PLAN NOTE
Patient had recent BKA at [facility] with recent debridement last week  - patient has active drainage from wound with continued pain and complications    Plan  - morphine 4 mg q4 PRN for severe pain  - Wound Care consulted, appreciate recs  - Gen Surg states no surgical intervention recommended at this time.   - ID recommend to continue with meropenem through 2/7

## 2022-02-03 NOTE — SUBJECTIVE & OBJECTIVE
Interval History: Symptoms:  Improved.   Diet:  Adequate intake.    Activity level:  Impaired due to weakness.  Impaired due to pain.  Pain: Poorly controlled, requiring pain medication.      Review of Systems   Constitutional: Negative for chills and fever.   HENT: Negative for congestion and rhinorrhea.    Eyes: Negative for pain and visual disturbance.   Respiratory: Negative for chest tightness and shortness of breath.    Cardiovascular: Negative for chest pain and palpitations.   Gastrointestinal: Positive for constipation. Negative for abdominal distention, abdominal pain, diarrhea, nausea and vomiting.   Genitourinary: Negative for difficulty urinating and dysuria.   Musculoskeletal: Negative for back pain and neck pain.        Right BKA wound pain   Skin: Negative for rash and wound.   Neurological: Negative for dizziness and headaches.   Psychiatric/Behavioral: Negative for confusion and sleep disturbance.     Objective:     Vital Signs (Most Recent):  Temp: 98.2 °F (36.8 °C) (02/03/22 0830)  Pulse: 96 (02/03/22 1149)  Resp: 16 (02/03/22 1058)  BP: 115/68 (02/03/22 0830)  SpO2: 97 % (02/03/22 0830) Vital Signs (24h Range):  Temp:  [97.2 °F (36.2 °C)-98.2 °F (36.8 °C)] 98.2 °F (36.8 °C)  Pulse:  [] 96  Resp:  [16-19] 16  SpO2:  [95 %-98 %] 97 %  BP: (114-169)/(56-83) 115/68     Weight: 67.6 kg (149 lb 0.5 oz)  Body mass index is 24.8 kg/m².  No intake or output data in the 24 hours ending 02/03/22 1312   Physical Exam  Vitals and nursing note reviewed.   Constitutional:       General: She is not in acute distress.     Appearance: Normal appearance. She is not ill-appearing or diaphoretic.   HENT:      Head: Normocephalic and atraumatic.      Right Ear: External ear normal.      Left Ear: External ear normal.      Nose: Nose normal. No congestion or rhinorrhea.      Mouth/Throat:      Mouth: Mucous membranes are moist.      Pharynx: Oropharynx is clear.   Eyes:      General: No scleral icterus.      Extraocular Movements: Extraocular movements intact.      Pupils: Pupils are equal, round, and reactive to light.   Cardiovascular:      Rate and Rhythm: Normal rate and regular rhythm.      Pulses: Normal pulses.      Heart sounds: Normal heart sounds.   Pulmonary:      Effort: Pulmonary effort is normal.      Breath sounds: Normal breath sounds. No wheezing, rhonchi or rales.   Abdominal:      General: Bowel sounds are normal. There is no distension.      Palpations: Abdomen is soft.      Tenderness: There is no abdominal tenderness. There is no right CVA tenderness or left CVA tenderness.   Musculoskeletal:         General: Normal range of motion.      Cervical back: Normal range of motion and neck supple. No tenderness.      Comments: Bilateral BKA, wound with slight blood draining on R   Lymphadenopathy:      Cervical: No cervical adenopathy.   Skin:     General: Skin is warm and dry.      Findings: No rash.      Nails: There is clubbing.   Neurological:      Mental Status: She is alert and oriented to person, place, and time.   Psychiatric:         Mood and Affect: Mood normal.         Behavior: Behavior normal.         Thought Content: Thought content normal.         Judgment: Judgment normal.         Significant Labs:   All pertinent labs within the past 24 hours have been reviewed.  CBC:   Recent Labs   Lab 02/02/22  0612 02/03/22  0508 02/03/22  0923   WBC 8.98 7.44 9.40   HGB 7.1* 6.4* 7.4*   HCT 22.5* 20.8* 22.5*    391 442     CMP:   Recent Labs   Lab 02/02/22  0612 02/03/22  0507    137   K 4.5 4.6    100   CO2 28 30*   GLU 86 94   BUN 52* 51*   CREATININE 1.5* 1.5*   CALCIUM 9.4 9.0   PROT 7.2 6.8   ALBUMIN 1.8* 1.6*   BILITOT 0.4 0.3   ALKPHOS 100 100   AST 29 32   ALT 13 17   ANIONGAP 9 7*   EGFRNONAA 38.9* 38.9*     Magnesium:   Recent Labs   Lab 02/02/22  0612 02/03/22  0507   MG 2.6 2.4       Significant Imaging: I have reviewed all pertinent imaging results/findings within the  past 24 hours.

## 2022-02-03 NOTE — ASSESSMENT & PLAN NOTE
56 y.o. female with PMHx of CKD III, DM, GERD, HLD, HTN, PAD with extensive history of vascular interventions ultimately with L AKA in May 2021 and now R BKA in December 2021 with stump revision on 1/24/22. General surgery consulted to evaluate wound as source of elevated WBC.     - No evidence of purulent drainage or surrounding erythema. Stump is mild to moderately swollen, hemostatic on exam. MRI of R stump shows edema and small fluid collection (can be expected within this post op period) adjacent to tibia, negative for osteomyelitis]  - No signs of infection on exam  - No acute surgical intervention   - Transfuse for Hgb <7  - Continue local wound care, daily dressing changes. Consider wound care consult.   - Remainder of care per primary team  - The team will sign off  - Please contact general surgery with any questions, concerns, clinical status changes, or signs of infection develop

## 2022-02-03 NOTE — SUBJECTIVE & OBJECTIVE
Interval History: NAEON. Afebrile and VSS. Patient reports constipation and right BKA pain. She denies CP, SOB, n/v/d/c, fever, and chills. Nutritionist consulted for diet education. Increased insulin per patient history. Added senna tabs for constipation.  Restarted pregabalin at half home dose and sertraline at home dose. De-escalated antibiotics to only meropenem, d/c vanc and azithro.    Review of Systems   Constitutional: Negative for chills and fever.   HENT: Negative for congestion and rhinorrhea.    Eyes: Negative for pain and visual disturbance.   Respiratory: Negative for chest tightness and shortness of breath.    Cardiovascular: Negative for chest pain and palpitations.   Gastrointestinal: Positive for constipation. Negative for abdominal distention, abdominal pain, diarrhea, nausea and vomiting.   Genitourinary: Negative for difficulty urinating and dysuria.   Musculoskeletal: Negative for back pain and neck pain.        Right BKA wound pain   Skin: Negative for rash and wound.   Neurological: Negative for dizziness and headaches.   Psychiatric/Behavioral: Negative for confusion and sleep disturbance.     Objective:     Vital Signs (Most Recent):  Temp: 97.7 °F (36.5 °C) (02/02/22 1938)  Pulse: 100 (02/02/22 1938)  Resp: 17 (02/02/22 2154)  BP: 132/69 (02/02/22 1938)  SpO2: 98 % (02/02/22 1938) Vital Signs (24h Range):  Temp:  [97 °F (36.1 °C)-98.1 °F (36.7 °C)] 97.7 °F (36.5 °C)  Pulse:  [] 100  Resp:  [16-20] 17  SpO2:  [93 %-100 %] 98 %  BP: (115-169)/(53-83) 132/69     Weight: 67.6 kg (149 lb 0.5 oz)  Body mass index is 24.8 kg/m².    Intake/Output Summary (Last 24 hours) at 2/2/2022 2159  Last data filed at 2/2/2022 1200  Gross per 24 hour   Intake 480 ml   Output --   Net 480 ml      Physical Exam  Vitals and nursing note reviewed.   Constitutional:       General: She is not in acute distress.     Appearance: Normal appearance. She is not ill-appearing or diaphoretic.   HENT:      Head:  Normocephalic and atraumatic.      Right Ear: External ear normal.      Left Ear: External ear normal.      Nose: Nose normal. No congestion or rhinorrhea.      Mouth/Throat:      Mouth: Mucous membranes are moist.      Pharynx: Oropharynx is clear.   Eyes:      General: No scleral icterus.     Extraocular Movements: Extraocular movements intact.      Pupils: Pupils are equal, round, and reactive to light.   Cardiovascular:      Rate and Rhythm: Normal rate and regular rhythm.      Pulses: Normal pulses.      Heart sounds: Normal heart sounds.   Pulmonary:      Effort: Pulmonary effort is normal.      Breath sounds: Normal breath sounds. No wheezing, rhonchi or rales.   Abdominal:      General: Bowel sounds are normal. There is no distension.      Palpations: Abdomen is soft.      Tenderness: There is no abdominal tenderness. There is no right CVA tenderness or left CVA tenderness.   Musculoskeletal:         General: Normal range of motion.      Cervical back: Normal range of motion and neck supple. No tenderness.      Comments: Bilateral BKA   Lymphadenopathy:      Cervical: No cervical adenopathy.   Skin:     General: Skin is warm and dry.      Findings: Rash present. Rash is scaling.      Nails: There is clubbing.   Neurological:      Mental Status: She is alert and oriented to person, place, and time.   Psychiatric:         Mood and Affect: Mood normal.         Behavior: Behavior normal.         Thought Content: Thought content normal.         Judgment: Judgment normal.         Significant Labs: All pertinent labs within the past 24 hours have been reviewed.    Significant Imaging: I have reviewed all pertinent imaging results/findings within the past 24 hours.

## 2022-02-03 NOTE — ASSESSMENT & PLAN NOTE
Ms. Connelly is a 55 yo female s/p r BKA and hx of Pseudomonas infection at the site of surgery presenting with AMS, leukocytosis, and elevated procalc  - likely sepsis due to wound infection/OM  - ESR and CRP slightly elevated  - Procalc of 0.38    Plan  - wound care consulted  - blood cx x2, wound cx, resp cx  - urine cx in process to r/o other source  - Inflammatory markers pending ESR/CRP   - CBC daily to trend WCC  - meropenem given hx of Pseudomonal infection  - ID consulted, appreciate recs  -MRI Sacrum and R leg without evidence of OM  - Gen Surg following given repeated wound infection s/p BKA

## 2022-02-03 NOTE — ASSESSMENT & PLAN NOTE
Patient on melatonin, remeron, zoloft     Plan  - restart zoloft 50mg  - PRN melatonin  - restart remeron evening of 2/3

## 2022-02-03 NOTE — ASSESSMENT & PLAN NOTE
Patient has myoclonal jerks on exam  - likely in setting of pregabalin build with ROSLYN  - improving with holding meds    Plan  - Since ROSLYN resolving, restarted half-home dose pregabalin  - follow up with PCP  - neuro checks  - delirium precautions

## 2022-02-03 NOTE — PROGRESS NOTES
"[facility] Medicine  Progress Note    Patient Name: Suyapa Connelly  MRN: [id number]  Patient Class: IP- Inpatient   Admission Date: 1/31/2022  Length of Stay: 2 days  Attending Physician: Flavia Delgado MD  Primary Care Provider: Magen Christensen MD        Subjective:     Principal Problem:Severe sepsis        HPI:  Suyapa Connelly is a 55 y/o female with PMHx CKD3, DMII, GERD, HLD, HTN, osteomyelitis, PVD, depression, and anxiety presents to ED with  for concerns of increased confusion from her rehab facility.  at bedside is concern for progressively worsening appearance of RLE wound, along with continued drainage, and worsening mental status of his wife which has progressed through this week. Patient emotionally labile and responds yes or no to most questions. She reports increased pain in her R leg with nausea over the past week. She also reports constipation without abdominal pain. She denies dysuria. Patient denies fevers, chills, vomiting, chest pain, cough, and shortness of breath.     Patient underwent right BKA by Dr. Rojas on 12/28 then discharged to rehab facility on 1/10. Since then she has recurrent hospital visits for the wound, requiring revisions and debridements; the last resulted in a pseudomonas positive wound culture resistant to cefepime 1/20. [facility] rehab RN stated that patient was "very confused this morning and more lethargic than normal." Per rehab, pt is normally AOx3 and able to follow commands. Rehab states they were concerned for sepsis due to elevated WBC and ROSLYN. Per , her last known normal was 1 weeks ago.    She was admitted to hospital medicine for severe sepsis.       Overview/Hospital Course:  Patient was admitted to hospital 1/31 for AMS and concern about worsening appearance of RLE wound. Patient was started on antibiotics (cefepime, vancomycin & azithromycin), blood, wound, resp & urine samples were collected for culture due to " concern for sepsis and ID and Gen Surg (s/p BKA on 12/28) were consulted. Pregabalin was withheld due to patient's myoclonus, likely 2/2 to ROSLYN leading to increased serum concentrations. Patient's mental status slowly improving throughout hospitalization, likely AMS due to both pregabalin intox and sepsis. ROSLYN resolved with IVF and abx. Increasing limb pain, pregabalin restarted at low dose. Azithromycin and vanc discontinued and continuing with meropenem. ID with final recs to continue through the 7th with meropenem, patient medically ready to return to rehab on 2/3, awaiting acceptance.       Interval History: Symptoms:  Improved.   Diet:  Adequate intake.    Activity level:  Impaired due to weakness.  Impaired due to pain.  Pain: Poorly controlled, requiring pain medication.      Review of Systems   Constitutional: Negative for chills and fever.   HENT: Negative for congestion and rhinorrhea.    Eyes: Negative for pain and visual disturbance.   Respiratory: Negative for chest tightness and shortness of breath.    Cardiovascular: Negative for chest pain and palpitations.   Gastrointestinal: Positive for constipation. Negative for abdominal distention, abdominal pain, diarrhea, nausea and vomiting.   Genitourinary: Negative for difficulty urinating and dysuria.   Musculoskeletal: Negative for back pain and neck pain.        Right BKA wound pain   Skin: Negative for rash and wound.   Neurological: Negative for dizziness and headaches.   Psychiatric/Behavioral: Negative for confusion and sleep disturbance.     Objective:     Vital Signs (Most Recent):  Temp: 98.2 °F (36.8 °C) (02/03/22 0830)  Pulse: 96 (02/03/22 1149)  Resp: 16 (02/03/22 1058)  BP: 115/68 (02/03/22 0830)  SpO2: 97 % (02/03/22 0830) Vital Signs (24h Range):  Temp:  [97.2 °F (36.2 °C)-98.2 °F (36.8 °C)] 98.2 °F (36.8 °C)  Pulse:  [] 96  Resp:  [16-19] 16  SpO2:  [95 %-98 %] 97 %  BP: (114-169)/(56-83) 115/68     Weight: 67.6 kg (149 lb 0.5  oz)  Body mass index is 24.8 kg/m².  No intake or output data in the 24 hours ending 02/03/22 1312   Physical Exam  Vitals and nursing note reviewed.   Constitutional:       General: She is not in acute distress.     Appearance: Normal appearance. She is not ill-appearing or diaphoretic.   HENT:      Head: Normocephalic and atraumatic.      Right Ear: External ear normal.      Left Ear: External ear normal.      Nose: Nose normal. No congestion or rhinorrhea.      Mouth/Throat:      Mouth: Mucous membranes are moist.      Pharynx: Oropharynx is clear.   Eyes:      General: No scleral icterus.     Extraocular Movements: Extraocular movements intact.      Pupils: Pupils are equal, round, and reactive to light.   Cardiovascular:      Rate and Rhythm: Normal rate and regular rhythm.      Pulses: Normal pulses.      Heart sounds: Normal heart sounds.   Pulmonary:      Effort: Pulmonary effort is normal.      Breath sounds: Normal breath sounds. No wheezing, rhonchi or rales.   Abdominal:      General: Bowel sounds are normal. There is no distension.      Palpations: Abdomen is soft.      Tenderness: There is no abdominal tenderness. There is no right CVA tenderness or left CVA tenderness.   Musculoskeletal:         General: Normal range of motion.      Cervical back: Normal range of motion and neck supple. No tenderness.      Comments: Bilateral BKA, wound with slight blood draining on R   Lymphadenopathy:      Cervical: No cervical adenopathy.   Skin:     General: Skin is warm and dry.      Findings: No rash.      Nails: There is clubbing.   Neurological:      Mental Status: She is alert and oriented to person, place, and time.   Psychiatric:         Mood and Affect: Mood normal.         Behavior: Behavior normal.         Thought Content: Thought content normal.         Judgment: Judgment normal.         Significant Labs:   All pertinent labs within the past 24 hours have been reviewed.  CBC:   Recent Labs   Lab  02/02/22  0612 02/03/22  0508 02/03/22  0923   WBC 8.98 7.44 9.40   HGB 7.1* 6.4* 7.4*   HCT 22.5* 20.8* 22.5*    391 442     CMP:   Recent Labs   Lab 02/02/22  0612 02/03/22  0507    137   K 4.5 4.6    100   CO2 28 30*   GLU 86 94   BUN 52* 51*   CREATININE 1.5* 1.5*   CALCIUM 9.4 9.0   PROT 7.2 6.8   ALBUMIN 1.8* 1.6*   BILITOT 0.4 0.3   ALKPHOS 100 100   AST 29 32   ALT 13 17   ANIONGAP 9 7*   EGFRNONAA 38.9* 38.9*     Magnesium:   Recent Labs   Lab 02/02/22  0612 02/03/22  0507   MG 2.6 2.4       Significant Imaging: I have reviewed all pertinent imaging results/findings within the past 24 hours.           Assessment/Plan:      * Severe sepsis  Ms. Connelly is a 55 yo female s/p r BKA and hx of Pseudomonas infection at the site of surgery presenting with AMS, leukocytosis, and elevated procalc  - likely sepsis due to wound infection/OM  - ESR and CRP slightly elevated  - Procalc of 0.38  -MRI Sacrum and R leg without evidence of OM  - Gen Surg following given repeated wound infection s/p BKA, no surgical intervention, signing off  - blood cx x2, wound cx, resp cx NGTD  - urine cx NGTD to r/o other source     Plan  - wound care consulted  - Inflammatory markers elevated, confirming likely source is wound  - CBC daily to trend WCC  - meropenem 2g q12 until 2/7 per ID recs        Myoclonus  Patient has myoclonal jerks on exam  - likely in setting of pregabalin build with ROSLYN  - improving with holding meds    Plan  - Since ROSLYN resolving, restarted half-home dose pregabalin  - follow up with PCP  - neuro checks  - delirium precautions      ROSLYN (acute kidney injury)  HPatient with acute kidney injury likely d/t IVVD/Dehydration Which is currently worsening. Labs reviewed- Renal function/electrolytes with Estimated Creatinine Clearance: 38.1 mL/min (A) (based on SCr of 1.5 mg/dL (H)). according to latest data. Monitor urine output and serial BMP and adjust therapy as needed. Avoid nephrotoxins and  renally dose meds for GFR listed above.   - improved with IVF, likely pre renal    Plan  - strict I/Os  - renally dose meds  - avoid nephrotoxic agents  - RESOLVED      Altered mental state  Likely in setting of infection  - CTH negative    Plan  - neuro checks  - delirium precautions      Decubitus ulcer of sacral region, unstageable  Wound care consulted, appreciate recs    MRI Sacrum W Out contrast (in setting of ROSLYN) without signs of osteomyelitis.     S/P BKA (below knee amputation) unilateral, right  Patient had recent BKA at [facility] with recent debridement last week  - patient has active drainage from wound with continued pain and complications    Plan  - morphine 4 mg q4 PRN for severe pain  - Wound Care consulted, appreciate recs  - Gen Surg states no surgical intervention recommended at this time.   - ID recommend to continue with meropenem through 2/7    Hypoalbuminemia  Boost glucose control TIDWM     Impaired functional mobility and endurance  PT/OT consulted, recommend returning to Rehab, awaiting acceptance    Type 2 diabetes mellitus with diabetic nephropathy, with long-term current use of insulin  Lab Results   Component Value Date    HGBA1C 9.6 (H) 12/03/2021     Plan  - Diabetic diet  - MDSSI PRN with POCT glu TIDWM + QHS  - detemir 10 QD  - Aspart 5 TIDWM  - increase basal and add bolus regimen per requirements      Acute blood loss anemia  Patient has Hgb of 6.6 on admission    Plan  - transfuse 1U pRBC  - CBC daily, transfuse Hgb < 7      Mixed hyperlipidemia  Patient on atorvastatin 80mg at home    Plan  - continue home med     CAROLIN (generalized anxiety disorder)  Patient on melatonin, remeron, zoloft     Plan  - restart zoloft 50mg  - PRN melatonin  - restart remeron evening of 2/3    Moderate malnutrition  Boost TIDWM      VTE Risk Mitigation (From admission, onward)           Ordered     apixaban tablet 5 mg  2 times daily         02/03/22 1134     IP VTE HIGH RISK PATIENT  Once          01/31/22 1930     Place sequential compression device  Until discontinued         01/31/22 1930                    Discharge Planning   DEVAN: 2/3/2022     Code Status: Prior   Is the patient medically ready for discharge?: No    Reason for patient still in hospital (select all that apply): Pending disposition  Discharge Plan A: Rehab   Discharge Delays: None known at this time    Tracy Blair MD  Department of Hospital Medicine   [facility]                      02/03/2022                             STAFF PHYSICIAN NOTE                                   Attending Attestation for Rounds with Resident  I have reviewed and concur with the resident's history, physical, assessment, and plan.  I have personally interviewed and examined the patient at bedside and agree with the resident's findings.           57 yo with hx of bilat BKA, T2DM, depression, neuropathic pain presenting with recurrent sepsis likely due to wound infection (stump vs sacral) and PNA.  Long discussion w pt and her  about her care and welfare. [facility] meropen[facility]7. [facility].                               Flavia Delgado MD  Senior Hospitalist  [id number], [phone]

## 2022-02-03 NOTE — PT/OT/SLP EVAL
"Physical Therapy Co-Evaluation    Patient Name:  Suyapa Connelly   MRN:  [id number]    Recommendations:     Discharge Recommendations:  rehabilitation facility   Discharge Equipment Recommendations: none   Barriers to discharge: Decreased caregiver support    Assessment:       Suyapa Connelly is a 56 y.o. female admitted with a medical diagnosis of Severe sepsis s/p recent R BKA (hx of L AKA).  She presents with the following impairments/functional limitations:  weakness,impaired endurance,impaired self care skills,impaired functional mobilty,gait instability,decreased lower extremity function,pain,impaired balance,decreased ROM.      Patient demonstrates decreased independence secondary to pain and weakness.  Patient's pain limiting overall tolerance; however patient tolerated transferring from bed to drop arm bedside commode with min A and tolerated sitting on commode ~ 5 min to change bed.  Patient demonstrates overall decreased balance and functional mobility requiring further skilled PT in an acute care and inpatient rehab setting to increase independence and home safety.      Rehab Prognosis: Good; patient would benefit from acute skilled PT services 4 x/week to address these deficits and reach maximum level of function.  Patient is most appropriate to go to rehabilitation facility .  Recent Surgery: * No surgery found *      Plan:     During this hospitalization, patient to be seen 4 x/week to address the identified rehab impairments via gait training,therapeutic activities,therapeutic exercises,neuromuscular re-education and progress toward the following goals:    · Plan of Care Expires:  03/05/22    Subjective     Subjective:"I am hurting but I will try"  Pain/Comfort:  · Pain Rating 1: 7/10  · Location - Side 1: Right  · Location - Orientation 1: generalized  · Location 1: leg  · Pain Addressed 1: Pre-medicate for activity,Reposition,Cessation of Activity  · Pain Rating Post-Intervention 1: " 9/10    Patients cultural, spiritual, Lutheran conflicts given the current situation: no    Living Environment:  Prior level of function: Patient has been in and out of hospital multiple times past few weeks with infection.  Before R BKA, patient had been tx to wheelchair and commode with supervision from spouse.  Spouse reports back problems with plan for upcoming back sx.  Residence: lives with their spouse 1-story house/ trailer, ramped, tub-shower   Support available upon discharge: family  Equipment owned: wheelchair,hospital bed,bath bench,bedside commode  Objective:     Communicated with nursing prior to session.  Patient found supine with telemetry  upon PT entry to room.    General Precautions: Standard, fall   Orthopedic Precautions:N/A   Braces: N/A     Exams:  · RLE ROM: ~ 75% of functional range  · RLE Strength: grossly decreased secondary to pain  · LLE ROM: WFL  · LLE Strength: hip strength WFL  · Cognitive Exam:  Patient is oriented to Person, Place, Time and Situation      Functional Mobility:  · Bed Mobility:     · Supine to Sit: contact guard assistance   · Sit to Supine: minimum assistance for LE management  · Transfers:     · Bed to Chair: minimum assistance with none for scooting  using  Scooting  · Balance:   · Sitting: FAIR+: Maintains balance through MINIMAL excursions of active trunk motion    Therapeutic Activities and Exercises:   Patient require min A for weight shifting and assistance to scoot from bedside commode to bed.  Patient was with greatly elevated pain limiting overall independence and tolerance.    Patient Education:    Patient educated on assistive device use, bed mobility training, Fall risk, home safety, Home exercise program, plan of care and transfer training by explanation.  Patient was receptive to education and needs reinforcement.     AM-PAC 6 CLICK MOBILITY  Total Score:12     Patient left supine with all lines intact, call button in reach and spouse  present.    GOALS:   Multidisciplinary Problems     Physical Therapy Goals        Problem: Physical Therapy Goal    Goal Priority Disciplines Outcome Goal Variances Interventions   Physical Therapy Goal     PT, PT/OT Ongoing, Progressing     Description: Goals to be met by: 22    Patient will increase functional independence with mobility by performin. Supine to sit with Set-up [place]  2. Sit to supine with Set-up [place]  3. Patient will demonstrate independence with a home exercise program  4. Bed to chair transfer with Stand-by Assistance using No Assistive Device  5. Wheelchair propulsion x250 feet with Stand-by Assistance using bilateral uppper extremities                   History:     Past Medical History:   Diagnosis Date    Anxiety     Chronic pain syndrome     CKD (chronic kidney disease), stage III     Depression     Diabetes mellitus     type 2    Diabetes mellitus, type 2     GERD (gastroesophageal reflux disease)     Hyperemesis 3/23/2021    Hyperlipemia     Hypertension     Hypokalemia 3/23/2021    Myocardial infarction     minor-caused by stress per pt.    Osteomyelitis     Osteomyelitis of left foot 2021    PVD (peripheral vascular disease)     Vaginal delivery     x1       Past Surgical History:   Procedure Laterality Date    ABOVE-KNEE AMPUTATION Left 2021    Procedure: AMPUTATION, ABOVE KNEE;  Surgeon: Teddy Huber MD;  Location: [address];  Service: Vascular;  Laterality: Left;    Angiogram - Right Extremity Right 7/9/15    angiogram-left leg  10/6/15    ANGIOGRAPHY OF LOWER EXTREMITY Left 2021    Procedure: ANGIOGRAM, LOWER EXTREMITY;  Surgeon: Teddy Huber MD;  Location: [address];  Service: Vascular;  Laterality: Left;    BELOW KNEE AMPUTATION OF LOWER EXTREMITY Right 2021    Procedure: AMPUTATION, BELOW KNEE;  Surgeon: Kaitlyn Rojas MD;  Location: [facility];  Service: General;  Laterality:  Right;    CATHETERIZATION OF BOTH LEFT AND RIGHT HEART N/A 12/18/2019    Procedure: CATHETERIZATION, HEART, BOTH LEFT AND RIGHT;  Surgeon: Que Fernando III, MD;  Location: [facility] CATH LAB;  Service: Cardiology;  Laterality: N/A;    CORONARY ANGIOGRAPHY N/A 12/18/2019    Procedure: ANGIOGRAM, CORONARY ARTERY;  Surgeon: Que Fernando III, MD;  Location: [facility] CATH LAB;  Service: Cardiology;  Laterality: N/A;    CORONARY ANGIOGRAPHY INCLUDING BYPASS GRAFTS WITH CATHETERIZATION OF LEFT HEART N/A 7/28/2020    Procedure: ANGIOGRAM, CORONARY, INCLUDING BYPASS GRAFT, WITH LEFT HEART CATHETERIZATION, 9 am;  Surgeon: Rachel Easley MD;  Location: [facility] CATH LAB;  Service: Cardiology;  Laterality: N/A;    CORONARY ARTERY BYPASS GRAFT (CABG) N/A 1/14/2020    Procedure: CORONARY ARTERY BYPASS GRAFT (CABG) x 1     Off Pump;  Surgeon: Huang Altamirano MD;  Location: [facility] OR [address] [place] [address];  Service: Cardiovascular;  Laterality: N/A;    CREATION OF FEMORAL-TIBIAL ARTERY BYPASS Left 4/29/2021    Procedure: CREATION, BYPASS, ARTERIAL, FEMORAL TO ANTERIOR TIBIAL;  Surgeon: Teddy Huber MD;  Location: [facility] OR [address] [place] [address];  Service: Vascular;  Laterality: Left;    CREATION OF FEMOROPOPLITEAL ARTERIAL BYPASS USING GRAFT Left 8/18/2020    Procedure: CREATION, BYPASS, ARTERIAL, FEMORAL TO POPLITEAL, USING GRAFT, LEFT LOWER EXTREMITY;  Surgeon: Teddy Huber MD;  Location: [facility];  Service: Vascular;  Laterality: Left;  REQUEST 7:15 A.M. START----COVID NEGATIVE ON 8/17  1ST CASE STARTE PER LEANA ON 8/7/2020 @ 942AM-LO  RN PREOP 8/12/2020   T/S-----CLEARED BY CARDS-------PENDING INSURANCE    DEBRIDEMENT OF FOOT Left 9/8/2020    Procedure: DEBRIDEMENT, FOOT;  Surgeon: Rosio Mayes DPM;  Location: [facility] OR;  Service: Podiatry;  Laterality: Left;  request neoxx .   RN Pre Op 9-4-2020, Covid negative on 9/5/20. C A    DEBRIDEMENT OF FOOT  3/4/2021    Procedure: DEBRIDEMENT, FOOT;  Surgeon: Teddy Huber MD;   Location: [facility] OR;  Service: Vascular;;    DEBRIDEMENT OF FOOT Left 3/9/2021    Procedure: DEBRIDEMENT, FOOT, bone biopsy;  Surgeon: Rosio Mayes DPM;  Location: [facility] OR;  Service: Podiatry;  Laterality: Left;  Request neoxx---COVID IN AM  REQUESTING NOON START  RN Phone Pre op.On Blood thinners Plavix and Eliquis.  Covid am of surgery. C A    DEBRIDEMENT OF FOOT Left 5/4/2021    Procedure: DEBRIDEMENT, FOOT;  Surgeon: Farooq Morley DPM;  Location: [facility] OR 2ND FLR;  Service: Podiatry;  Laterality: Left;    INSERTION OF TUNNELED CENTRAL VENOUS HEMODIALYSIS CATHETER N/A 1/27/2020    Procedure: Insertion, Catheter, Central Venous, Hemodialysis;  Surgeon: ESTEBAN Gomez III, MD;  Location: [facility] CATH LAB;  Service: Peripheral Vascular;  Laterality: N/A;    PERCUTANEOUS TRANSLUMINAL ANGIOPLASTY N/A 3/4/2021    Procedure: PTA (ANGIOPLASTY, PERCUTANEOUS, TRANSLUMINAL);  Surgeon: Teddy Huber MD;  Location: [facility] OR;  Service: Vascular;  Laterality: N/A;    REMOVAL OF ARTERIOVENOUS GRAFT Left 5/27/2021    Procedure: REMOVAL, GRAFT, LEFT LOWER EXTREMITY, WOUND EXPLORATION;  Surgeon: Teddy Huber MD;  Location: [facility] OR [facility] FLR;  Service: Vascular;  Laterality: Left;    REMOVAL OF NAIL OF DIGIT Left 3/9/2021    Procedure: REMOVAL, NAIL, DIGIT;  Surgeon: Rosio Mayes DPM;  Location: [facility] OR;  Service: Podiatry;  Laterality: Left;    THROMBECTOMY Left 3/4/2021    Procedure: THROMBECTOMY, LEFT LOWER EXTREMITY BYPASS GRAFT, ANGIOGRAM, POSSIBLE INTERVENTION, POSSIBLE LEFT LOWER EXTREMITY BYPASS;  Surgeon: Teddy Huber MD;  Location: [facility] OR;  Service: Vascular;  Laterality: Left;    THROMBECTOMY Left 4/29/2021    Procedure: GRAFT THROMBECTOMY, LEFT LOWER EXTREMITY;  Surgeon: Teddy Huber MD;  Location: [facility] OR 2ND FLR;  Service: Vascular;  Laterality: Left;  14.5 min  1179.85 mGy  341.01 Gycm2  240 ml dye    THROMBECTOMY  10/22/2021    Procedure: THROMBECTOMY;  Surgeon: Saad BEEBE  MD Dagoberto;  Location: [facility] CATH LAB/EP;  Service: Cardiology;;       Time Tracking:     PT Received On: 02/03/22  PT Start Time: 1426     PT Stop Time: 1507  PT Total Time (min): 41 min     Billable Minutes: Evaluation 10 min Therapeutic Activity 31 min      Huang Sahu, PT  02/03/2022

## 2022-02-03 NOTE — PROGRESS NOTES
Andrew Andrade - [facility] Surg  General Surgery  Progress Note    Subjective:     History of Present Illness:  Suyapa Connelly is a 56 y.o. female with PMHx of CKD III, DM, GERD, HLD, HTN, PAD with extensive history of vascular interventions ultimately with L AKA in May 2021 and now R BKA in December 2021 with stump revision on 1/24/22 who was sent here from rehab facility with confusion yesterday. Difficult to obtain clear history from patient, however, she does note ongoing pain at the R BKA stump site. Upon presentation here, noted to have drainage from the stump as well. WBC elevated at 15, though afebrile. MRI obtained which demonstrates edema at R BKA stump site with small fluid collection around distal tibia. Her dressing was changed today, has had bleeding from site afterward.   She underwent stump revision due to evidence of infection, culture positive for Pseudomonas. She is currently on Vanc, Meropenem, and Azithromycin.       Post-Op Info:  * No surgery found *         Interval History: NAEON. Afebrile. HDS. Reports feeling better this morning. Dressing changed at bedside. Pain is controlled with current regimen. No new symptoms or concerns this morning. All questions answered.   Hgb 6.4   > 83    Medications:  Continuous Infusions:  Scheduled Meds:   atorvastatin  80 mg Oral QHS    insulin aspart U-100  5 Units Subcutaneous TIDWM    insulin detemir U-100  10 Units Subcutaneous QHS    meropenem (MERREM) IVPB  2 g Intravenous Q12H    polyethylene glycol  17 g Oral Daily    pregabalin  75 mg Oral BID    senna  8.6 mg Oral Daily    sertraline  50 mg Oral Daily     PRN Meds:acetaminophen, albuterol sulfate, dextrose 50%, dextrose 50%, glucagon (human recombinant), glucose, glucose, hydrALAZINE, HYDROcodone-acetaminophen, insulin aspart U-100, melatonin, morphine, morphine, naloxone, ondansetron, sodium chloride 0.9%     Review of patient's allergies indicates:   Allergen Reactions    Ciprofloxacin  Itching    Contrast media      Kidney injury    Iodine      Kidney injury     Objective:     Vital Signs (Most Recent):  Temp: 97.2 °F (36.2 °C) (02/03/22 0349)  Pulse: 93 (02/03/22 0712)  Resp: 17 (02/03/22 0640)  BP: (!) 114/56 (02/03/22 0349)  SpO2: 97 % (02/03/22 0349) Vital Signs (24h Range):  Temp:  [97 °F (36.1 °C)-98.1 °F (36.7 °C)] 97.2 °F (36.2 °C)  Pulse:  [] 93  Resp:  [16-20] 17  SpO2:  [95 %-100 %] 97 %  BP: (114-169)/(53-83) 114/56     Weight: 67.6 kg (149 lb 0.5 oz)  Body mass index is 24.8 kg/m².    Intake/Output - Last 3 Shifts       02/01 0700  02/02 0659 02/02 0700 02/03 0659 02/03 0700  02/04 0659    P.O. 240 480     Blood       IV Piggyback 350      Total Intake(mL/kg) 590 (8.7) 480 (7.1)     Net +590 +480            Stool Occurrence 1 x            Physical Exam  Vitals and nursing note reviewed.   Constitutional:       General: She is not in acute distress.     Appearance: She is not diaphoretic.      Comments: Room air   HENT:      Head: Normocephalic and atraumatic.      Mouth/Throat:      Mouth: Mucous membranes are moist.      Pharynx: Oropharynx is clear.   Eyes:      Extraocular Movements: Extraocular movements intact.      Conjunctiva/sclera: Conjunctivae normal.   Cardiovascular:      Rate and Rhythm: Normal rate.   Pulmonary:      Effort: Pulmonary effort is normal. No respiratory distress.   Musculoskeletal:         General: No deformity.      Comments: L AKA  R BKA, hemostatic with old blood on dressing, mild wound dehiscence but incisions in place, no evidence of purulence or surrounding erythema. TTP improved from previous  Skin:     General: Skin is warm and dry.   Neurological:      Mental Status: She is alert and oriented to person, place, and time.     Significant Labs:  I have reviewed all pertinent lab results within the past 24 hours.  CBC:   Recent Labs   Lab 02/03/22  0508   WBC 7.44   RBC 2.14*   HGB 6.4*   HCT 20.8*      MCV 97   MCH 29.9   MCHC 30.8*      CMP:   Recent Labs   Lab 02/03/22  0507   GLU 94   CALCIUM 9.0   ALBUMIN 1.6*   PROT 6.8      K 4.6   CO2 30*      BUN 51*   CREATININE 1.5*   ALKPHOS 100   ALT 17   AST 32   BILITOT 0.3       Significant Diagnostics:  I have reviewed all pertinent imaging results/findings within the past 24 hours.    Assessment/Plan:     S/P BKA (below knee amputation) unilateral, right  56 y.o. female with PMHx of CKD III, DM, GERD, HLD, HTN, PAD with extensive history of vascular interventions ultimately with L AKA in May 2021 and now R BKA in December 2021 with stump revision on 1/24/22. General surgery consulted to evaluate wound as source of elevated WBC.     - No evidence of purulent drainage or surrounding erythema. Stump is mild to moderately swollen, hemostatic on exam. MRI of R stump shows edema and small fluid collection (can be expected within this post op period) adjacent to tibia, negative for osteomyelitis]  - No signs of infection on exam  - No acute surgical intervention   - Transfuse for Hgb <7  - Continue local wound care, daily dressing changes. Consider wound care consult.   - Remainder of care per primary team  - The team will sign off  - Please contact general surgery with any questions, concerns, clinical status changes, or signs of infection develop           Buzz Goetz PA-C  General Surgery  [facility] - Med Surg

## 2022-02-03 NOTE — PLAN OF CARE
Problem: Adult Inpatient Plan of Care  Goal: Plan of Care Review  Outcome: Ongoing, Progressing  Goal: Patient-Specific Goal (Individualized)  Outcome: Ongoing, Progressing  Goal: Absence of Hospital-Acquired Illness or Injury  Outcome: Ongoing, Progressing  Goal: Optimal Comfort and Wellbeing  Outcome: Ongoing, Progressing  Goal: Readiness for Transition of Care  Outcome: Ongoing, Progressing     Problem: Diabetes Comorbidity  Goal: Blood Glucose Level Within Targeted Range  Outcome: Ongoing, Progressing     Problem: Adjustment to Illness (Sepsis/Septic Shock)  Goal: Optimal Coping  Outcome: Ongoing, Progressing

## 2022-02-03 NOTE — CONSULTS
Nutrition-Related Diabetes Education      Time Spent: 20 minutes    Learners:  Patient and family    Current HbA1c:   9.6  Is patient aware of their A1c and their goal A1c?       __X_____ yes    Home diabetes medication(s): insulin    Nutrition Education with handouts: verbal, declined written materials. Patient spouse indicates that last year she got her A1c down below 6 even drinking regular cola.    Comments:Patient asked for this consult, but rather than wanting diet education, she wanted to make sure that she would receive boost glucose TID, for protein for wound healing.   We spoke of diet for constipation, fiber and fluid.  We spoke about elevated glucose impairs wound healing. We discussed protein sources in addition to eggs and boost glucose. Patient is capable of calling the dietary service for her menu needs.   Discussed protein food available on unit for HS snacks as well.          Barriers to Learning: None  Follow up: weekly    Please consult as needed.  Thank you!

## 2022-02-03 NOTE — ASSESSMENT & PLAN NOTE
Ms. Connelly is a 57 yo female s/p r BKA and hx of Pseudomonas infection at the site of surgery presenting with AMS, leukocytosis, and elevated procalc  - likely sepsis due to wound infection/OM  - ESR and CRP slightly elevated  - Procalc of 0.38  -MRI Sacrum and R leg without evidence of OM  - Gen Surg following given repeated wound infection s/p BKA, no surgical intervention, signing off  - blood cx x2, wound cx, resp cx NGTD  - urine cx NGTD to r/o other source     Plan  - wound care consulted  - Inflammatory markers elevated, confirming likely source is wound  - CBC daily to trend WCC  - meropenem 2g q12 until 2/7 per ID recs

## 2022-02-03 NOTE — ASSESSMENT & PLAN NOTE
HPatient with acute kidney injury likely d/t IVVD/Dehydration Which is currently worsening. Labs reviewed- Renal function/electrolytes with Estimated Creatinine Clearance: 38.1 mL/min (A) (based on SCr of 1.5 mg/dL (H)). according to latest data. Monitor urine output and serial BMP and adjust therapy as needed. Avoid nephrotoxins and renally dose meds for GFR listed above.     Plan  - strict I/Os  - renal lytes pending  - renally dose meds  - avoid nephrotoxic agents

## 2022-02-03 NOTE — PLAN OF CARE
Problem: Physical Therapy Goal  Goal: Physical Therapy Goal  Description: Goals to be met by: 22    Patient will increase functional independence with mobility by performin. Supine to sit with Set-up [place]  2. Sit to supine with Set-up [place]  3. Patient will demonstrate independence with a home exercise program  4. Bed to chair transfer with Stand-by Assistance using No Assistive Device  5. Wheelchair propulsion x250 feet with Stand-by Assistance using bilateral uppper extremities  Outcome: Ongoing, Progressing     PT evaluation completed, goals established and plan of care reviewed with patient.  Patient demonstrates decreased independence secondary to pain and weakness.  Patient's pain limiting overall tolerance; however patient tolerated transferring from bed to drop arm bedside commode with min A and tolerated sitting on commode ~ 5 min to change bed.  Patient demonstrates overall decreased balance and functional mobility requiring further skilled PT in an acute care and inpatient rehab setting to increase independence and home safety.      Huang Sahu, PT, DPT  2/3/2022

## 2022-02-03 NOTE — ASSESSMENT & PLAN NOTE
Patient on melatonin, remeron, zoloft     Plan  - restart zoloft 50mg  - PRN melatonin  - Consider restarting remeron if ROSLYN continues to improve

## 2022-02-03 NOTE — ASSESSMENT & PLAN NOTE
Patient has myoclonal jerks on exam  - likely in setting of pregabalin build with ROSLYN    Plan  - Since ROSLYN resolving, restart half-home dose pregabalin  - neuro checks

## 2022-02-03 NOTE — PLAN OF CARE
Plan for rehab. [facility] will sign off. Thank you for referring.     [facility]  Devin Kelly Rn, Clinical Educator  Cell [phone]  Office [phone]  Fax [phone]

## 2022-02-03 NOTE — PROGRESS NOTES
Andrew Andrdae - [facility] Surg  Wound Care    Patient Name:  Suyapa Connelly   MRN:  [id number]  Date: 2/3/2022  Diagnosis: Severe sepsis    History:     Past Medical History:   Diagnosis Date    Anxiety     Chronic pain syndrome     CKD (chronic kidney disease), stage III     Depression     Diabetes mellitus     type 2    Diabetes mellitus, type 2     GERD (gastroesophageal reflux disease)     Hyperemesis 3/23/2021    Hyperlipemia     Hypertension     Hypokalemia 3/23/2021    Myocardial infarction 2010    minor-caused by stress per pt.   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      Social Determinants of Health     Financial Resource Strain: High Risk    Difficulty of Paying Living Expenses: Hard   Food Insecurity: No Food Insecurity    Worried About Running Out of Food in the Last Year: Never true    Ran Out of Food in the Last Year: Never true   Transportation Needs: No Transportation Needs    Lack of Transportation (Medical): No    Lack of Transportation (Non-Medical): No   Stress: Stress Concern Present    Feeling of Stress : To some extent   Social Connections: Unknown    Frequency of Communication with Friends and Family: More than three times a week    Frequency of Social Gatherings with Friends and Family: Three times a week    Marital Status:    Housing Stability: Low Risk     Unable to Pay for Housing in the Last Year: No    Number of Places Lived in the Last Year: 1    Unstable  Housing in the Last Year: No       Precautions:     Allergies as of 01/31/2022 - Reviewed 01/31/2022   Allergen Reaction Noted    Ciprofloxacin Itching 07/18/2020    Contrast media  01/10/2020    Iodine  01/10/2020       [facility] Assessment Details/Treatment   Wound care consulted for buttocks and R BKA per MD  The left buttocks has a MASD/IAD wound with pink scar tissue with tan slough in the wound beds  No erythema, no drainage.  The right BKA incision has intact sutures, red/moist tissue with scant to small amount of serosanguinous drainage. She reports much pain when the stump is moved.   Discussed/demonstated wound care, verbalized understanding    Plan:  Left buttock- cleanse with cleansing cloths, apply Triad ointment BID/prn cleansing  Right BKA incision- cleanse with Vashe wound solution, pat dry, apply Aquacel Ag rope hydrofiber dressing over incision, cover with a telfa island dressing, ABD dressing to the posterior stump and secure with flexinet every M-W-F.    Wale overlay    Nursing to continue care, pressure prevention measures  Wound care will follow up prn  Recommendations made to primary team per secure chat for above plan . Orders placed.      02/03/22 1140        Incision/Site 01/24/22 1200 Right Knee   Date First Assessed/Time First Assessed: 01/24/22 1200   Side: Right  Location: Knee   Wound Image    Incision WDL ex   Dressing Appearance Intact;Moist drainage   Drainage Amount Scant   Drainage Characteristics/Odor Serosanguineous   Appearance Red;Moist;Sutures intact   Periwound Area Intact;Dry;Ecchymotic   Wound Edges Open   Wound Length (cm) 1.3 cm   Wound Width (cm) 15 cm   Wound Depth (cm) 0.6 cm   Wound Volume (cm^3) 11.7 cm^3   Wound Surface Area (cm^2) 19.5 cm^2   Care Cleansed with:;Antimicrobial agent   Dressing Applied;Hydrofiber;Silver;Gauze;Island/border;Absorptive Pad;Tubular bandage   Dressing Change Due 02/04/22        Altered Skin Integrity 02/03/22 1140 Left midline Coccyx #1  Moisture associated dermatitis   Date First Assessed/Time First Assessed: (c) 02/03/22 1140   Altered Skin Integrity Present on Admission: yes  Side: Left  Orientation: midline  Location: Coccyx  Wound Number: #1  Is this injury device related?: (c) Other (see comments)  Primary Woun...   Wound Image    Dressing Appearance Open to air   Drainage Amount None   Appearance Intact;Pink;Tan;Slough;Moist;Dry   Tissue loss description Full thickness   Periwound Area Intact;Dry;Pink;Scar tissue   Wound Edges Irregular;Open   Care Cleansed with:;Sterile normal saline;Applied:;Skin Barrier       02/03/2022

## 2022-02-03 NOTE — PLAN OF CARE
Eval and POC  Problem: Occupational Therapy Goal  Goal: Occupational Therapy Goal  Description: Goals to be met by: 03/01/22    Patient will increase functional independence with ADLs by performing:    LE Dressing with Stand-by Assistance.  Toileting from bedside commode with Set-up Assistance for hygiene and clothing management.   Sliding board transfers with min (A) to drop arm BSC  Outcome: Ongoing, Progressing

## 2022-02-03 NOTE — PLAN OF CARE
02/03/22 1251   Post-Acute Status   Post-Acute Authorization Placement   Post-Acute Placement Status Referrals Sent   Coverage [employer] Managed Medicare   Discharge Delays None known at this time   Discharge Plan   Discharge Plan A Rehab   Discharge Plan B Rehab     Patient was admitted from [facility] and is now ready for DC. Sent referral to [facility] to see about bed availability.    Tammy Will, [facility] [place]  [phone]

## 2022-02-03 NOTE — PROGRESS NOTES
"[facility]  Progress Note    Patient Name: Suyapa Connelly  MRN: [id number]  Patient Class: IP- Inpatient   Admission Date: 1/31/2022  Length of Stay: 1 days  Attending Physician: Jacinto Eugene MD  Primary Care Provider: Magen Christensen MD        Subjective:     Principal Problem:Severe sepsis        HPI:  Suyapa Connelly is a 57 y/o female with PMHx CKD3, DMII, GERD, HLD, HTN, osteomyelitis, PVD, depression, and anxiety presents to ED with  for concerns of increased confusion from her rehab facility.  at bedside is concern for progressively worsening appearance of RLE wound, along with continued drainage, and worsening mental status of his wife which has progressed through this week. Patient emotionally labile and responds yes or no to most questions. She reports increased pain in her R leg with nausea over the past week. She also reports constipation without abdominal pain. She denies dysuria. Patient denies fevers, chills, vomiting, chest pain, cough, and shortness of breath.     Patient underwent right BKA by Dr. Rojas on 12/28 then discharged to rehab facility on 1/10. Since then she has recurrent hospital visits for the wound, requiring revisions and debridements; the last resulted in a pseudomonas positive wound culture resistant to cefepime 1/20. [facility] rehab RN stated that patient was "very confused this morning and more lethargic than normal." Per rehab, pt is normally AOx3 and able to follow commands. Rehab states they were concerned for sepsis due to elevated WBC and ROSLYN. Per , her last known normal was 1 weeks ago.    She was admitted to hospital medicine for severe sepsis.       Overview/Hospital Course:  Patient was admitted to hospital 1/31 for AMS and concern about worsening appearance of RLE wound. Patient was started on antibiotics (cefepime, vancomycin & azithromycin), blood, wound, resp & urine samples were collected for culture due to " concern for sepsis and ID and Gen Surg (s/p BKA on 12/28) were consulted. Pregabalin was withheld due to patient's myoclonus, likely 2/2 to ROSLYN leading to increased serum concentrations. Overnight there were JUAN. On the morning of 2/1, patient's mental status had improved slightly. Cefepime was switched to meropenem. Late morning, patient exhibited signs of delirium during encounter with medical team. ROSLYN improving. Increasing limb pain, pregabalin restarted. Nutrition consulted for diabetes education. Azithromycin and vanc discontinued and continuing with meropenem. ID with final recs to continue through the 7th with meropenem.      Interval History: NAEON. Afebrile and VSS. Patient reports constipation and right BKA pain. She denies CP, SOB, n/v/d/c, fever, and chills. Nutritionist consulted for diet education. Increased insulin per patient history. Added senna tabs for constipation.  Restarted pregabalin at half home dose and sertraline at home dose. De-escalated antibiotics to only meropenem, d/c vanc and azithro.    Review of Systems   Constitutional: Negative for chills and fever.   HENT: Negative for congestion and rhinorrhea.    Eyes: Negative for pain and visual disturbance.   Respiratory: Negative for chest tightness and shortness of breath.    Cardiovascular: Negative for chest pain and palpitations.   Gastrointestinal: Positive for constipation. Negative for abdominal distention, abdominal pain, diarrhea, nausea and vomiting.   Genitourinary: Negative for difficulty urinating and dysuria.   Musculoskeletal: Negative for back pain and neck pain.        Right BKA wound pain   Skin: Negative for rash and wound.   Neurological: Negative for dizziness and headaches.   Psychiatric/Behavioral: Negative for confusion and sleep disturbance.     Objective:     Vital Signs (Most Recent):  Temp: 97.7 °F (36.5 °C) (02/02/22 1938)  Pulse: 100 (02/02/22 1938)  Resp: 17 (02/02/22 2154)  BP: 132/69 (02/02/22 1938)  SpO2:  98 % (02/02/22 1938) Vital Signs (24h Range):  Temp:  [97 °F (36.1 °C)-98.1 °F (36.7 °C)] 97.7 °F (36.5 °C)  Pulse:  [] 100  Resp:  [16-20] 17  SpO2:  [93 %-100 %] 98 %  BP: (115-169)/(53-83) 132/69     Weight: 67.6 kg (149 lb 0.5 oz)  Body mass index is 24.8 kg/m².    Intake/Output Summary (Last 24 hours) at 2/2/2022 2159  Last data filed at 2/2/2022 1200  Gross per 24 hour   Intake 480 ml   Output --   Net 480 ml      Physical Exam  Vitals and nursing note reviewed.   Constitutional:       General: She is not in acute distress.     Appearance: Normal appearance. She is not ill-appearing or diaphoretic.   HENT:      Head: Normocephalic and atraumatic.      Right Ear: External ear normal.      Left Ear: External ear normal.      Nose: Nose normal. No congestion or rhinorrhea.      Mouth/Throat:      Mouth: Mucous membranes are moist.      Pharynx: Oropharynx is clear.   Eyes:      General: No scleral icterus.     Extraocular Movements: Extraocular movements intact.      Pupils: Pupils are equal, round, and reactive to light.   Cardiovascular:      Rate and Rhythm: Normal rate and regular rhythm.      Pulses: Normal pulses.      Heart sounds: Normal heart sounds.   Pulmonary:      Effort: Pulmonary effort is normal.      Breath sounds: Normal breath sounds. No wheezing, rhonchi or rales.   Abdominal:      General: Bowel sounds are normal. There is no distension.      Palpations: Abdomen is soft.      Tenderness: There is no abdominal tenderness. There is no right CVA tenderness or left CVA tenderness.   Musculoskeletal:         General: Normal range of motion.      Cervical back: Normal range of motion and neck supple. No tenderness.      Comments: Bilateral BKA   Lymphadenopathy:      Cervical: No cervical adenopathy.   Skin:     General: Skin is warm and dry.      Findings: Rash present. Rash is scaling.      Nails: There is clubbing.   Neurological:      Mental Status: She is alert and oriented to person,  place, and time.   Psychiatric:         Mood and Affect: Mood normal.         Behavior: Behavior normal.         Thought Content: Thought content normal.         Judgment: Judgment normal.         Significant Labs: All pertinent labs within the past 24 hours have been reviewed.    Significant Imaging: I have reviewed all pertinent imaging results/findings within the past 24 hours.      Assessment/Plan:      * Severe sepsis  Ms. Connelly is a 55 yo female s/p r BKA and hx of Pseudomonas infection at the site of surgery presenting with AMS, leukocytosis, and elevated procalc  - likely sepsis due to wound infection/OM  - ESR and CRP slightly elevated  - Procalc of 0.38    Plan  - wound care consulted  - blood cx x2, wound cx, resp cx  - urine cx in process to r/o other source  - Inflammatory markers pending ESR/CRP   - CBC daily to trend WCC  - meropenem given hx of Pseudomonal infection  - ID consulted, appreciate recs  -MRI Sacrum and R leg without evidence of OM  - Gen Surg following given repeated wound infection s/p BKA      Myoclonus  Patient has myoclonal jerks on exam  - likely in setting of pregabalin build with ROSLYN    Plan  - Since ROSLYN resolving, restart half-home dose pregabalin  - neuro checks       ROSLYN (acute kidney injury)  HPatient with acute kidney injury likely d/t IVVD/Dehydration Which is currently worsening. Labs reviewed- Renal function/electrolytes with Estimated Creatinine Clearance: 38.1 mL/min (A) (based on SCr of 1.5 mg/dL (H)). according to latest data. Monitor urine output and serial BMP and adjust therapy as needed. Avoid nephrotoxins and renally dose meds for GFR listed above.     Plan  - strict I/Os  - renal lytes pending  - renally dose meds  - avoid nephrotoxic agents      Altered mental state  Likely in setting of infection    Plan  - neuro checks  - delirium precautions  - CT Head to r/o other causes      Decubitus ulcer of sacral region, unstageable  Wound care consulted, appreciate  recs    MRI Sacrum W Out contrast (in setting of ROSLYN) without signs of osteomyelitis.     S/P BKA (below knee amputation) unilateral, right  Patient had recent BKA at [facility] with recent debridement last week  - patient has active drainage from wound with continued pain and complications    Plan  - morphine 4 mg q4 PRN for severe pain  - Wound Care consulted, appreciate recs  - Gen Surg will continue to follow and evaluate her wound for signs of infection, no surgical intervention recommended at this time.   - ID recommend to continue with meropenem    Hypoalbuminemia  Boost glucose control TIDWM   - Followup prealbumin    Impaired functional mobility and endurance  PT/OT consulted, appreciate recs      Type 2 diabetes mellitus with diabetic nephropathy, with long-term current use of insulin  Lab Results   Component Value Date    HGBA1C 9.6 (H) 12/03/2021     Plan  - Diabetic diet  - MDSSI PRN with POCT glu TIDWM + QHS  - detemir 10 QD  - Aspart 5 TIDWM  - increase basal and add bolus regimen per requirements      Acute blood loss anemia  Patient has Hgb of 6.6 on admission    Plan  - transfuse 1U pRBC  - CBC daily, transfuse Hgb < 7      Mixed hyperlipidemia  Patient on atorvastatin 80mg at home    Plan  - continue home med     CAROLIN (generalized anxiety disorder)  Patient on melatonin, remeron, zoloft     Plan  - restart zoloft 50mg  - PRN melatonin  - Consider restarting remeron if ROSLYN continues to improve    Moderate malnutrition  Boost TIDWM      VTE Risk Mitigation (From admission, onward)         Ordered     heparin (porcine) injection 5,000 Units  Every 8 hours         02/02/22 1439     IP VTE HIGH RISK PATIENT  Once         01/31/22 1930     Place sequential compression device  Until discontinued         01/31/22 1930                Discharge Planning   DEVAN: 2/4/2022     Code Status: Prior   Is the patient medically ready for discharge?: No    Reason for patient still in hospital (select all that apply):  Patient trending condition, Laboratory test, Treatment and PT / OT recommendations  Discharge Plan A: Home Health                  Dominik Manley MD  Department of Hospital Medicine   [facility]

## 2022-02-03 NOTE — ASSESSMENT & PLAN NOTE
Patient had recent BKA at [facility] with recent debridement last week  - patient has active drainage from wound with continued pain and complications    Plan  - morphine 4 mg q4 PRN for severe pain  - Wound Care consulted, appreciate recs  - Gen Surg will continue to follow and evaluate her wound for signs of infection, no surgical intervention recommended at this time.   - ID recommend to continue with meropenem

## 2022-02-03 NOTE — ASSESSMENT & PLAN NOTE
HPatient with acute kidney injury likely d/t IVVD/Dehydration Which is currently worsening. Labs reviewed- Renal function/electrolytes with Estimated Creatinine Clearance: 38.1 mL/min (A) (based on SCr of 1.5 mg/dL (H)). according to latest data. Monitor urine output and serial BMP and adjust therapy as needed. Avoid nephrotoxins and renally dose meds for GFR listed above.   - improved with IVF, likely pre renal    Plan  - strict I/Os  - renally dose meds  - avoid nephrotoxic agents  - RESOLVED

## 2022-02-03 NOTE — ASSESSMENT & PLAN NOTE
Lab Results   Component Value Date    HGBA1C 9.6 (H) 12/03/2021     Plan  - Diabetic diet  - MDSSI PRN with POCT glu TIDWM + QHS  - detemir 10 QD  - Aspart 5 TIDWM  - increase basal and add bolus regimen per requirements

## 2022-02-03 NOTE — SUBJECTIVE & OBJECTIVE
Interval History: NAEON. Afebrile. HDS. Reports feeling better this morning. Dressing changed at bedside. Pain is controlled with current regimen. No new symptoms or concerns this morning. All questions answered.   Hgb 6.4   > 83    Medications:  Continuous Infusions:  Scheduled Meds:   atorvastatin  80 mg Oral QHS    insulin aspart U-100  5 Units Subcutaneous TIDWM    insulin detemir U-100  10 Units Subcutaneous QHS    meropenem (MERREM) IVPB  2 g Intravenous Q12H    polyethylene glycol  17 g Oral Daily    pregabalin  75 mg Oral BID    senna  8.6 mg Oral Daily    sertraline  50 mg Oral Daily     PRN Meds:acetaminophen, albuterol sulfate, dextrose 50%, dextrose 50%, glucagon (human recombinant), glucose, glucose, hydrALAZINE, HYDROcodone-acetaminophen, insulin aspart U-100, melatonin, morphine, morphine, naloxone, ondansetron, sodium chloride 0.9%     Review of patient's allergies indicates:   Allergen Reactions    Ciprofloxacin Itching    Contrast media      Kidney injury    Iodine      Kidney injury     Objective:     Vital Signs (Most Recent):  Temp: 97.2 °F (36.2 °C) (02/03/22 0349)  Pulse: 93 (02/03/22 0712)  Resp: 17 (02/03/22 0640)  BP: (!) 114/56 (02/03/22 0349)  SpO2: 97 % (02/03/22 0349) Vital Signs (24h Range):  Temp:  [97 °F (36.1 °C)-98.1 °F (36.7 °C)] 97.2 °F (36.2 °C)  Pulse:  [] 93  Resp:  [16-20] 17  SpO2:  [95 %-100 %] 97 %  BP: (114-169)/(53-83) 114/56     Weight: 67.6 kg (149 lb 0.5 oz)  Body mass index is 24.8 kg/m².    Intake/Output - Last 3 Shifts       02/01 0700  02/02 0659 02/02 0700  02/03 0659 02/03 0700  02/04 0659    P.O. 240 480     Blood       IV Piggyback 350      Total Intake(mL/kg) 590 (8.7) 480 (7.1)     Net +590 +480            Stool Occurrence 1 x            Physical Exam  Vitals and nursing note reviewed.   Constitutional:       General: She is not in acute distress.     Appearance: She is not diaphoretic.      Comments: Room air   HENT:      Head:  Normocephalic and atraumatic.      Mouth/Throat:      Mouth: Mucous membranes are moist.      Pharynx: Oropharynx is clear.   Eyes:      Extraocular Movements: Extraocular movements intact.      Conjunctiva/sclera: Conjunctivae normal.   Cardiovascular:      Rate and Rhythm: Normal rate.   Pulmonary:      Effort: Pulmonary effort is normal. No respiratory distress.   Musculoskeletal:         General: No deformity.      Comments: L AKA  R BKA, hemostatic with old blood on dressing, mild wound dehiscence but incisions in place, no evidence of purulence or surrounding erythema. TTP improved from previous  Skin:     General: Skin is warm and dry.   Neurological:      Mental Status: She is alert and oriented to person, place, and time.     Significant Labs:  I have reviewed all pertinent lab results within the past 24 hours.  CBC:   Recent Labs   Lab 02/03/22  0508   WBC 7.44   RBC 2.14*   HGB 6.4*   HCT 20.8*      MCV 97   MCH 29.9   MCHC 30.8*     CMP:   Recent Labs   Lab 02/03/22  0507   GLU 94   CALCIUM 9.0   ALBUMIN 1.6*   PROT 6.8      K 4.6   CO2 30*      BUN 51*   CREATININE 1.5*   ALKPHOS 100   ALT 17   AST 32   BILITOT 0.3       Significant Diagnostics:  I have reviewed all pertinent imaging results/findings within the past 24 hours.

## 2022-02-03 NOTE — CARE UPDATE
Full note to follow    56 sherry jama female with DM2, CKD4, HTN, HLD, GERD, sacral decub, PVD s/p Bilateral AKA most recently right AKA on 12/28 with revision 1/24/22 and recent admission for presumed surgical stump site infection on IV abx at rehab who presents for acute encephalopathy and ROSLYN with concern for sepsis.  MRI stump reviewed.  Her mental status and myoclonic jerks have significantly improved.  Overall I suspect her the majority of he presentation was 2/2 lyrica buildup given ROSLYN.  Appreciate ID and GS recs.  Discussed diet and plan of care extensively with family.    Jacinto Eugene M.D.  [facility]  Pager [phone]

## 2022-02-03 NOTE — PLAN OF CARE
[facility]    FACILITY TRANSFER ORDERS      Patient Name: Suyapa Connelly  YOB: 1965    PCP: Magen Christensen MD   PCP Address: [address] [place] [address]  PCP Phone Number: [phone]  PCP Fax: [phone]    Encounter Date: 02/10/2022    Admit to: Rehab    Vital Signs:  Routine    Diagnoses:   Active Hospital Problems    Diagnosis  POA    *Severe sepsis [A41.9, R65.20]  Yes    Altered mental state [R41.82]  Yes    ROSLYN (acute kidney injury) [N17.9]  Yes    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]  Yes    Decubitus ulcer of sacral region, unstageable [L89.150]  Yes    S/P BKA (below knee amputation) unilateral, right [Z89.511]  Not Applicable    Hypoalbuminemia [E88.09]  Yes    Impaired functional mobility and endurance [Z74.09]  Yes    Type 2 diabetes mellitus with diabetic nephropathy, with long-term current use of insulin [E11.21, Z79.4]  Not Applicable    Acute blood loss anemia [D62]  Yes    Mixed hyperlipidemia [E78.2]  Yes     Chronic    CAROLIN (generalized anxiety disorder) [F41.1]  Yes     Chronic    Moderate malnutrition [E44.0]  Yes      Resolved Hospital Problems    Diagnosis Date Resolved POA    Amputation above knee [S78.119A] 01/31/2022 Yes    Essential hypertension [I10] 01/31/2022 Yes     Chronic       Allergies:  Review of patient's allergies indicates:   Allergen Reactions    Ciprofloxacin Itching    Contrast media      Kidney injury    Iodine      Kidney injury       Diet: diabetic diet: 2000 calorie    Activities: Activity as tolerated       Labs: weekly cbc cmp esr crp sent to [facility]  Dr. Espino at [phone]    CONSULTS:    Physical Therapy to evaluate and treat.  and Occupational Therapy to evaluate and treat.    MISCELLANEOUS CARE:  Routine Skin for Bedridden Patients: Apply moisture barrier cream to all skin folds and wet areas in perineal area daily and after baths and all bowel movements. and Diabetes Care:   SN to perform and educate Diabetic  management with blood glucose monitoring:     Administer (drug and dose): Meropenem 2g q12 hours  Last dose given: 2/3/22                         Home dose due: evening of 2/3/22  STOP DATE: 2/7/22     weekly cbc cmp esr crp sent to [facility]  Dr. Espino at [phone]     Scrub the Hub: Prior to accessing the line, always perform a 30 second alcohol scrub  Each lumen of the central line is to be flushed at least daily with 10 mL Normal Saline and 3 mL Heparin flush (10 units/mL)  Skilled Nurse (SN) may draw blood from IV access  Blood Draw Procedure:              - Aspirate at least 5 mL of blood              - Discard              - Obtain specimen              - Change injection cap              - Flush with 20 mL Normal Saline followed by a                 3-5 mL Heparin flush (10 units/mL)  Central :              - Sterile dressing changes are done weekly and as needed.              - Use chlor-hexadine scrub to cleanse site, apply Biopatch to insertion site,                 apply securement device dressing              - Injection caps are changed weekly and after EVERY lab draw.              - If sterile gauze is under dressing to control oozing,                 dressing change must be performed every 24 hours until gauze is not needed.      WOUND CARE ORDERS  Left buttock- cleanse with cleansing cloths, apply Triad ointment BID/prn cleansing     Right BKA incision- cleanse with Vashe wound solution, pat dry, apply Aquacel Ag rope hydrofiber dressing over incision, cover with a telfa island dressing, ABD dressing to the posterior stump and secure with flexinet every M-W-F.     Waffle overlay        Medications: Review discharge medications with patient and family and provide education.      Current Discharge Medication List      START taking these medications    Details   carvediloL (COREG) 3.125 MG tablet Take 1 tablet (3.125 mg total) by mouth 2 (two) times daily.  Qty: 60 tablet, Refills: 11     Comments: .      ondansetron (ZOFRAN-ODT) 8 MG TbDL Dissolve 1 tablet (8 mg total) by mouth every 8 (eight) hours as needed.  Qty: 30 tablet, Refills: 0      rivaroxaban (XARELTO) 2.5 mg Tab Take 1 tablet (2.5 mg total) by mouth 2 (two) times daily with meals.  Qty: 30 tablet, Refills: 11         CONTINUE these medications which have CHANGED    Details   furosemide (LASIX) 40 MG tablet Take 1 tablet (40 mg total) by mouth once daily. Please hold until follow up with PCP      sodium bicarbonate 650 MG tablet Take 1 tablet (650 mg total) by mouth 2 (two) times daily. Please hold until follow up with Primary Care Provider  Qty: 60 tablet, Refills: 11         CONTINUE these medications which have NOT CHANGED    Details   acetaminophen (TYLENOL) 500 MG tablet Take 2 tablets (1,000 mg total) by mouth every 8 (eight) hours as needed for Pain.  Refills: 0      allopurinoL (ZYLOPRIM) 100 MG tablet Take 1 tablet (100 mg total) by mouth once daily.  Qty: 30 tablet, Refills: 5      atorvastatin (LIPITOR) 80 MG tablet Take 1 tablet (80 mg total) by mouth every evening.  Qty: 90 tablet, Refills: 3      blood-glucose meter,continuous (DEXCOM G6 ) Misc Use to check blood sugars 4 times/day  Qty: 1 each, Refills: 0    Associated Diagnoses: Type 2 diabetes mellitus with diabetic nephropathy, with long-term current use of insulin      blood-glucose sensor (DEXCOM G6 SENSOR) Radha Apply one sensor to skin every 10 days  Qty: 3 each, Refills: 11    Associated Diagnoses: Type 2 diabetes mellitus with diabetic nephropathy, with long-term current use of insulin      blood-glucose transmitter (DEXCOM G6 TRANSMITTER) Radha Replace transmitter every 3 months to use with dexcom sensor  Qty: 1 each, Refills: 3    Associated Diagnoses: Type 2 diabetes mellitus with diabetic nephropathy, with long-term current use of insulin      clopidogreL (PLAVIX) 75 mg tablet Take 1 tablet (75 mg total) by mouth once daily.  Qty: 90 tablet, Refills: 3       HYDROcodone-acetaminophen (NORCO) 5-325 mg per tablet Take 1 tablet by mouth every 4 (four) hours as needed for Pain.  Qty: 12 tablet, Refills: 0    Comments: Quantity prescribed more than 7 day supply? No      insulin aspart U-100 (NOVOLOG FLEXPEN U-100 INSULIN) 100 unit/mL (3 mL) InPn pen Inject 5 Units into the skin 3 (three) times daily with meals. If not eating, ok to hold  Qty: 1 Box, Refills: 3    Associated Diagnoses: Uncontrolled type 2 diabetes mellitus with hyperglycemia; Type 2 diabetes mellitus with hyperglycemia, with long-term current use of insulin      insulin detemir U-100 (LEVEMIR FLEXTOUCH) 100 unit/mL (3 mL) SubQ InPn pen Inject 12 Units into the skin every evening.  Qty: 20 mL, Refills: 0      magnesium oxide (MAG-OX) 400 mg (241.3 mg magnesium) tablet Take 1 tablet (400 mg total) by mouth 2 (two) times daily.  Refills: 0      melatonin (MELATIN) 3 mg tablet Take 2 tablets (6 mg total) by mouth nightly as needed for Insomnia.  Refills: 0      mirtazapine (REMERON) 7.5 MG Tab Take 2 tablets (15 mg total) by mouth nightly.  Qty: 60 tablet, Refills: 11      multivit/folic acid/vit K1 (ONE-A-DAY WOMEN'S 50 PLUS ORAL) Take 1 tablet by mouth Daily.      oxyCODONE-acetaminophen (PERCOCET)  mg per tablet Take 1 tablet by mouth every 6 (six) hours as needed.  Qty: 20 tablet, Refills: 0    Comments: Quantity prescribed more than 7 day supply? Yes, quantity medically necessary      sertraline (ZOLOFT) 50 MG tablet Take 1 tablet (50 mg total) by mouth once daily.  Qty: 30 tablet, Refills: 11         STOP taking these medications       apixaban (ELIQUIS) 5 mg Tab Comments:   Reason for Stopping:         MEROPENEM 1 G/100 ML NS, READY TO MIX SYSTEM, Comments:   Reason for Stopping:         pregabalin (LYRICA) 50 MG capsule Comments:   Reason for Stopping:                  Immunizations Administered as of 2/4/2022     Name Date Dose VIS Date Route Exp Date    COVID-19, MRNA, LN-S, PF (Pfizer)  (Purple Cap) 3/30/2021 11:24 AM 0.3 mL 12/12/2020 Intramuscular 7/31/2021    Site: Right deltoid     Given By: Margoth Alfredo     : [employer]     Lot: [id number]     COVID-19, MRNA, LN-S, PF (Pfizer) (Purple Cap) 3/9/2021  2:50 PM 0.3 mL 12/12/2020 Intramuscular 6/30/2021    Site: Right deltoid     Given By: Amira Berrios RN     : [employer]     Lot: [id number]           This patient has had both covid vaccinations    Some patients may experience side effects after vaccination.  These may include fever, headache, muscle or joint aches.  Most symptoms resolve with 24-48 hours and do not require urgent medical evaluation unless they persist for more than 72 hours or symptoms are concerning for an unrelated medical condition.          _________________________________  Tracy Blair MD  02/08/2022

## 2022-02-03 NOTE — PT/OT/SLP EVAL
"Occupational Therapy   Evaluation    Name: Suypaa Connelly  MRN: [id number]  Admitting Diagnosis:  Severe sepsis  Recent Surgery: * No surgery found *      Recommendations:     Discharge Recommendations: rehabilitation facility  Discharge Equipment Recommendations:  none  Barriers to discharge:  None,Other (Comment) (return to rehab)    Assessment:     Suyapa Connelly is a 56 y.o. female with a medical diagnosis of Severe sepsis.  She presents with tearful mood. Performance deficits affecting function: weakness,impaired cardiopulmonary response to activity,pain,decreased lower extremity function,impaired self care skills,impaired balance,impaired endurance. The patient provided PLOF and home set up information. She was in rehab prior to this admission and desires to return to rehab. She is tearful and reports feeling sad about latest amputation. UE strength and rom WNL. UE dressing with set up, balance in long sit fair plus. Bed mobility SBA.     Rehab Prognosis: Good; patient would benefit from acute skilled OT services to address these deficits and reach maximum level of function.       Plan:     Patient to be seen 3 x/week to address the above listed problems via self-care/home management,therapeutic activities,therapeutic exercises  · Plan of Care Expires: 03/01/22  · Plan of Care Reviewed with: patient    Subjective     Chief Complaint: "Pain in right LE after amputation." tearful, sad about  This amputation and set back.   Patient/Family Comments/goals: "I want to return to rehab."    Occupational Profile:  Living Environment: Lives with spouse in single story home with ramp at entrance. She has walk in shower with bath bench. Home is wc accessible. She states she has not stood since lost of LLE in May 2019. She does not have prosthetic for LLE. Spouse drives and manages shopping. He has back pain and on disability.   Equipment Used at Home:  wheelchair,hospital bed,hip kit,bath bench (ramp)  Assistance upon " Discharge: spouse can not provide physical lifting assistance.     Pain/Comfort:  · Pain Rating 1: 3/10  · Location - Side 1: Right  · Location 1: leg  · Pain Addressed 1: Pre-medicate for activity,Nurse notified  · Pain Rating Post-Intervention 1: 3/10    Patients cultural, spiritual, Lutheran conflicts given the current situation: no    Objective:     Communicated with: RN prior to session.  Patient found HOB elevated with telemetry upon OT entry to room.    General Precautions: Standard, aspiration,fall   Orthopedic Precautions:N/A   Braces: N/A  Respiratory Status: Room air    Occupational Performance:    Bed Mobility:    · Patient completed Rolling/Turning to Left with  independence  · Patient completed Rolling/Turning to Right with independence  · Patient completed Supine to Sit with independence and long sit  · Patient completed Sit to Supine with independence    ·     Activities of Daily Living:  · Feeding:  independence    · Grooming: independence    · Upper Body Dressing: stand by assistance with gown in long sit  · Lower Body Dressing: moderate assistance as per patient report    Cognitive/Visual Perceptual:  Cognitive/Psychosocial Skills:     -       Oriented to: Person, Place, Time and Situation   -       Communication: clear/fluent  -       Memory: patient reports STM deficits recently  -       Safety awareness/insight to disability: intact     Physical Exam:  Upper Extremity Range of Motion:     -       Right Upper Extremity: WNL  -       Left Upper Extremity: WNL  Upper Extremity Strength:    -       Right Upper Extremity: WFL  -       Left Upper Extremity: WFL    AMPAC 6 Click ADL:  AMPAC Total Score: 17    Treatment & Education:  Education on POC, patient in agreement.   Education:    Patient left HOB elevated with all lines intact, call button in reach and RN present    GOALS:   Multidisciplinary Problems     Occupational Therapy Goals        Problem: Occupational Therapy Goal    Goal Priority  Disciplines Outcome Interventions   Occupational Therapy Goal     OT, PT/OT Ongoing, Progressing    Description: Goals to be met by: 03/01/22    Patient will increase functional independence with ADLs by performing:    LE Dressing with Stand-by Assistance.  Toileting from bedside commode with Set-up Assistance for hygiene and clothing management.   Sliding board transfers with min (A) to drop arm [facility]                   History:     Past Medical History:   Diagnosis Date    Anxiety     Chronic pain syndrome     CKD (chronic kidney disease), stage III     Depression     Diabetes mellitus     type 2    Diabetes mellitus, type 2     GERD (gastroesophageal reflux disease)     Hyperemesis 3/23/2021    Hyperlipemia     Hypertension     Hypokalemia 3/23/2021    Myocardial infarction 2010    minor-caused by stress per pt.    Osteomyelitis     Osteomyelitis of left foot 4/30/2021    PVD (peripheral vascular disease)     Vaginal delivery     x1       Past Surgical History:   Procedure Laterality Date    ABOVE-KNEE AMPUTATION Left 5/18/2021    Procedure: AMPUTATION, ABOVE KNEE;  Surgeon: Teddy Huber MD;  Location: [facility] OR [address] [place] [address];  Service: Vascular;  Laterality: Left;    Angiogram - Right Extremity Right 7/9/15    angiogram-left leg  10/6/15    ANGIOGRAPHY OF LOWER EXTREMITY Left 4/29/2021    Procedure: ANGIOGRAM, LOWER EXTREMITY;  Surgeon: Teddy Huber MD;  Location: [address];  Service: Vascular;  Laterality: Left;    BELOW KNEE AMPUTATION OF LOWER EXTREMITY Right 12/28/2021    Procedure: AMPUTATION, BELOW KNEE;  Surgeon: Kaitlyn Rojas MD;  Location: [facility] OR;  Service: General;  Laterality: Right;    CATHETERIZATION OF BOTH LEFT AND RIGHT HEART N/A 12/18/2019    Procedure: CATHETERIZATION, HEART, BOTH LEFT AND RIGHT;  Surgeon: Que Fernando III, MD;  Location: [facility] CATH LAB;  Service: Cardiology;  Laterality: N/A;    CORONARY ANGIOGRAPHY N/A 12/18/2019    Procedure:  ANGIOGRAM, CORONARY ARTERY;  Surgeon: Que Fernando III, MD;  Location: [facility] CATH LAB;  Service: Cardiology;  Laterality: N/A;    CORONARY ANGIOGRAPHY INCLUDING BYPASS GRAFTS WITH CATHETERIZATION OF LEFT HEART N/A 7/28/2020    Procedure: ANGIOGRAM, CORONARY, INCLUDING BYPASS GRAFT, WITH LEFT HEART CATHETERIZATION, 9 am;  Surgeon: Rachel Easley MD;  Location: [facility] CATH LAB;  Service: Cardiology;  Laterality: N/A;    CORONARY ARTERY BYPASS GRAFT (CABG) N/A 1/14/2020    Procedure: CORONARY ARTERY BYPASS GRAFT (CABG) x 1     Off Pump;  Surgeon: Huang Altamirano MD;  Location: [facility] OR [address] [place] [address];  Service: Cardiovascular;  Laterality: N/A;    CREATION OF FEMORAL-TIBIAL ARTERY BYPASS Left 4/29/2021    Procedure: CREATION, BYPASS, ARTERIAL, FEMORAL TO ANTERIOR TIBIAL;  Surgeon: Teddy Huber MD;  Location: [facility] OR [address] [place] [address];  Service: Vascular;  Laterality: Left;    CREATION OF FEMOROPOPLITEAL ARTERIAL BYPASS USING GRAFT Left 8/18/2020    Procedure: CREATION, BYPASS, ARTERIAL, FEMORAL TO POPLITEAL, USING GRAFT, LEFT LOWER EXTREMITY;  Surgeon: Teddy Huber MD;  Location: [facility];  Service: Vascular;  Laterality: Left;  REQUEST 7:15 A.M. START----COVID NEGATIVE ON 8/17 1ST CASE STARTKENYA PER LEANA ON 8/7/2020 @ 942AM-LO  RN PREOP 8/12/2020   T/S-----CLEARED BY CARDS-------PENDING INSURANCE    DEBRIDEMENT OF FOOT Left 9/8/2020    Procedure: DEBRIDEMENT, FOOT;  Surgeon: Rosio Mayes DPM;  Location: [facility] OR;  Service: Podiatry;  Laterality: Left;  request neoxx .   RN Pre Op 9-4-2020, Covid negative on 9/5/20. C A    DEBRIDEMENT OF FOOT  3/4/2021    Procedure: DEBRIDEMENT, FOOT;  Surgeon: Teddy Huber MD;  Location: [facility] OR;  Service: Vascular;;    DEBRIDEMENT OF FOOT Left 3/9/2021    Procedure: DEBRIDEMENT, FOOT, bone biopsy;  Surgeon: Rosio Mayes DPM;  Location: [facility] OR;  Service: Podiatry;  Laterality: Left;  Request neoxx---COVID IN AM  REQUESTING NOON START  RN Phone Pre op.On  Blood thinners Plavix and Eliquis.  Covid am of surgery. C A    DEBRIDEMENT OF FOOT Left 5/4/2021    Procedure: DEBRIDEMENT, FOOT;  Surgeon: Farooq Morley DPM;  [facility];  Service: Podiatry;  Laterality: Left;    INSERTION OF TUNNELED CENTRAL VENOUS HEMODIALYSIS CATHETER N/A 1/27/2020    Procedure: Insertion, Catheter, Central Venous, Hemodialysis;  Surgeon: ESTEBAN Gomez III, MD;  Location: [facility] CATH LAB;  Service: Peripheral Vascular;  Laterality: N/A;    PERCUTANEOUS TRANSLUMINAL ANGIOPLASTY N/A 3/4/2021    Procedure: PTA (ANGIOPLASTY, PERCUTANEOUS, TRANSLUMINAL);  Surgeon: Teddy Huber MD;  Location: [facility] OR;  Service: Vascular;  Laterality: N/A;    REMOVAL OF ARTERIOVENOUS GRAFT Left 5/27/2021    Procedure: REMOVAL, GRAFT, LEFT LOWER EXTREMITY, WOUND EXPLORATION;  Surgeon: Teddy Huber MD;  [facility];  Service: Vascular;  Laterality: Left;    REMOVAL OF NAIL OF DIGIT Left 3/9/2021    Procedure: REMOVAL, NAIL, DIGIT;  Surgeon: Rosio Mayes DPM;  Location: [facility] OR;  Service: Podiatry;  Laterality: Left;    THROMBECTOMY Left 3/4/2021    Procedure: THROMBECTOMY, LEFT LOWER EXTREMITY BYPASS GRAFT, ANGIOGRAM, POSSIBLE INTERVENTION, POSSIBLE LEFT LOWER EXTREMITY BYPASS;  Surgeon: Teddy Huber MD;  Location: [facility] OR;  Service: Vascular;  Laterality: Left;    THROMBECTOMY Left 4/29/2021    Procedure: GRAFT THROMBECTOMY, LEFT LOWER EXTREMITY;  Surgeon: Teddy Huber MD;  [address]  Service: Vascular;  Laterality: Left;  14.5 min  1179.85 mGy  341.01 Gycm2  240 ml dye    THROMBECTOMY  10/22/2021    Procedure: THROMBECTOMY;  Surgeon: Saad Arenas MD;  Location: [facility] CATH LAB/EP;  Service: Cardiology;;       Time Tracking:     OT Date of Treatment: 02/03/22  OT Start Time: 1020  OT Stop Time: 1044  OT Total Time (min): 24 min    Billable Minutes:Evaluation 5  Self Care/Home Management 19    2/3/2022

## 2022-02-04 LAB
ABO + RH BLD: NORMAL
ALBUMIN SERPL BCP-MCNC: 1.7 G/DL (ref 3.5–5.2)
ALP SERPL-CCNC: 92 U/L (ref 55–135)
ALT SERPL W/O P-5'-P-CCNC: 13 U/L (ref 10–44)
ANION GAP SERPL CALC-SCNC: 9 MMOL/L (ref 8–16)
AST SERPL-CCNC: 23 U/L (ref 10–40)
BASOPHILS # BLD AUTO: 0.06 K/UL (ref 0–0.2)
BASOPHILS # BLD AUTO: 0.07 K/UL (ref 0–0.2)
BASOPHILS # BLD AUTO: 0.09 K/UL (ref 0–0.2)
BASOPHILS NFR BLD: 0.8 % (ref 0–1.9)
BASOPHILS NFR BLD: 1 % (ref 0–1.9)
BASOPHILS NFR BLD: 1.1 % (ref 0–1.9)
BILIRUB SERPL-MCNC: 0.2 MG/DL (ref 0.1–1)
BLD GP AB SCN CELLS X3 SERPL QL: NORMAL
BLD PROD TYP BPU: NORMAL
BLOOD UNIT EXPIRATION DATE: NORMAL
BLOOD UNIT TYPE CODE: 5100
BLOOD UNIT TYPE: NORMAL
BUN SERPL-MCNC: 53 MG/DL (ref 6–20)
CALCIUM SERPL-MCNC: 8.9 MG/DL (ref 8.7–10.5)
CHLORIDE SERPL-SCNC: 103 MMOL/L (ref 95–110)
CO2 SERPL-SCNC: 26 MMOL/L (ref 23–29)
CODING SYSTEM: NORMAL
CREAT SERPL-MCNC: 1.4 MG/DL (ref 0.5–1.4)
DIFFERENTIAL METHOD: ABNORMAL
DISPENSE STATUS: NORMAL
EOSINOPHIL # BLD AUTO: 0.4 K/UL (ref 0–0.5)
EOSINOPHIL # BLD AUTO: 0.4 K/UL (ref 0–0.5)
EOSINOPHIL # BLD AUTO: 0.5 K/UL (ref 0–0.5)
EOSINOPHIL NFR BLD: 5.4 % (ref 0–8)
EOSINOPHIL NFR BLD: 5.6 % (ref 0–8)
EOSINOPHIL NFR BLD: 5.6 % (ref 0–8)
ERYTHROCYTE [DISTWIDTH] IN BLOOD BY AUTOMATED COUNT: 13.3 % (ref 11.5–14.5)
ERYTHROCYTE [DISTWIDTH] IN BLOOD BY AUTOMATED COUNT: 13.5 % (ref 11.5–14.5)
ERYTHROCYTE [DISTWIDTH] IN BLOOD BY AUTOMATED COUNT: 13.5 % (ref 11.5–14.5)
EST. GFR  (AFRICAN AMERICAN): 48.8 ML/MIN/1.73 M^2
EST. GFR  (NON AFRICAN AMERICAN): 42.3 ML/MIN/1.73 M^2
FERRITIN SERPL-MCNC: 1103 NG/ML (ref 20–300)
FOLATE SERPL-MCNC: 9.9 NG/ML (ref 4–24)
GLUCOSE SERPL-MCNC: 77 MG/DL (ref 70–110)
HCT VFR BLD AUTO: 21.6 % (ref 37–48.5)
HCT VFR BLD AUTO: 22.1 % (ref 37–48.5)
HCT VFR BLD AUTO: 22.2 % (ref 37–48.5)
HGB BLD-MCNC: 6.5 G/DL (ref 12–16)
HGB BLD-MCNC: 6.8 G/DL (ref 12–16)
HGB BLD-MCNC: 6.8 G/DL (ref 12–16)
IMM GRANULOCYTES # BLD AUTO: 0.03 K/UL (ref 0–0.04)
IMM GRANULOCYTES # BLD AUTO: 0.03 K/UL (ref 0–0.04)
IMM GRANULOCYTES # BLD AUTO: 0.04 K/UL (ref 0–0.04)
IMM GRANULOCYTES NFR BLD AUTO: 0.4 % (ref 0–0.5)
IMM GRANULOCYTES NFR BLD AUTO: 0.4 % (ref 0–0.5)
IMM GRANULOCYTES NFR BLD AUTO: 0.5 % (ref 0–0.5)
IRON SERPL-MCNC: 40 UG/DL (ref 30–160)
LYMPHOCYTES # BLD AUTO: 1.6 K/UL (ref 1–4.8)
LYMPHOCYTES # BLD AUTO: 1.6 K/UL (ref 1–4.8)
LYMPHOCYTES # BLD AUTO: 1.7 K/UL (ref 1–4.8)
LYMPHOCYTES NFR BLD: 19.3 % (ref 18–48)
LYMPHOCYTES NFR BLD: 21.8 % (ref 18–48)
LYMPHOCYTES NFR BLD: 23.3 % (ref 18–48)
MAGNESIUM SERPL-MCNC: 2.3 MG/DL (ref 1.6–2.6)
MCH RBC QN AUTO: 29.6 PG (ref 27–31)
MCH RBC QN AUTO: 30.1 PG (ref 27–31)
MCH RBC QN AUTO: 30.4 PG (ref 27–31)
MCHC RBC AUTO-ENTMCNC: 30.1 G/DL (ref 32–36)
MCHC RBC AUTO-ENTMCNC: 30.6 G/DL (ref 32–36)
MCHC RBC AUTO-ENTMCNC: 30.8 G/DL (ref 32–36)
MCV RBC AUTO: 100 FL (ref 82–98)
MCV RBC AUTO: 96 FL (ref 82–98)
MCV RBC AUTO: 99 FL (ref 82–98)
MONOCYTES # BLD AUTO: 0.7 K/UL (ref 0.3–1)
MONOCYTES # BLD AUTO: 0.7 K/UL (ref 0.3–1)
MONOCYTES # BLD AUTO: 0.8 K/UL (ref 0.3–1)
MONOCYTES NFR BLD: 10.1 % (ref 4–15)
MONOCYTES NFR BLD: 9 % (ref 4–15)
MONOCYTES NFR BLD: 9.3 % (ref 4–15)
NEUTROPHILS # BLD AUTO: 4.4 K/UL (ref 1.8–7.7)
NEUTROPHILS # BLD AUTO: 4.7 K/UL (ref 1.8–7.7)
NEUTROPHILS # BLD AUTO: 5.3 K/UL (ref 1.8–7.7)
NEUTROPHILS NFR BLD: 59.7 % (ref 38–73)
NEUTROPHILS NFR BLD: 62.4 % (ref 38–73)
NEUTROPHILS NFR BLD: 64.3 % (ref 38–73)
NRBC BLD-RTO: 0 /100 WBC
PHOSPHATE SERPL-MCNC: 5 MG/DL (ref 2.7–4.5)
PLATELET # BLD AUTO: 406 K/UL (ref 150–450)
PLATELET # BLD AUTO: 407 K/UL (ref 150–450)
PLATELET # BLD AUTO: 428 K/UL (ref 150–450)
PMV BLD AUTO: 11.5 FL (ref 9.2–12.9)
PMV BLD AUTO: 11.5 FL (ref 9.2–12.9)
PMV BLD AUTO: 11.8 FL (ref 9.2–12.9)
POCT GLUCOSE: 100 MG/DL (ref 70–110)
POCT GLUCOSE: 116 MG/DL (ref 70–110)
POCT GLUCOSE: 89 MG/DL (ref 70–110)
POTASSIUM SERPL-SCNC: 4.8 MMOL/L (ref 3.5–5.1)
PROT SERPL-MCNC: 6.8 G/DL (ref 6–8.4)
RBC # BLD AUTO: 2.16 M/UL (ref 4–5.4)
RBC # BLD AUTO: 2.24 M/UL (ref 4–5.4)
RBC # BLD AUTO: 2.3 M/UL (ref 4–5.4)
RETICS/RBC NFR AUTO: 2 % (ref 0.5–2.5)
SATURATED IRON: 24 % (ref 20–50)
SODIUM SERPL-SCNC: 138 MMOL/L (ref 136–145)
TOTAL IRON BINDING CAPACITY: 167 UG/DL (ref 250–450)
TRANS ERYTHROCYTES VOL PATIENT: NORMAL ML
TRANSFERRIN SERPL-MCNC: 113 MG/DL (ref 200–375)
WBC # BLD AUTO: 7.35 K/UL (ref 3.9–12.7)
WBC # BLD AUTO: 7.52 K/UL (ref 3.9–12.7)
WBC # BLD AUTO: 8.17 K/UL (ref 3.9–12.7)

## 2022-02-04 PROCEDURE — 99222 1ST HOSP IP/OBS MODERATE 55: CPT | Mod: HCNC,,, | Performed by: NURSE PRACTITIONER

## 2022-02-04 PROCEDURE — 99233 SBSQ HOSP IP/OBS HIGH 50: CPT | Mod: HCNC,,, | Performed by: HOSPITALIST

## 2022-02-04 PROCEDURE — 86850 RBC ANTIBODY SCREEN: CPT | Mod: HCNC

## 2022-02-04 PROCEDURE — 82941 ASSAY OF GASTRIN: CPT | Mod: HCNC

## 2022-02-04 PROCEDURE — 99222 PR INITIAL HOSPITAL CARE,LEVL II: ICD-10-PCS | Mod: HCNC,,, | Performed by: NURSE PRACTITIONER

## 2022-02-04 PROCEDURE — 11000001 HC ACUTE MED/SURG PRIVATE ROOM: Mod: HCNC

## 2022-02-04 PROCEDURE — 97530 THERAPEUTIC ACTIVITIES: CPT | Mod: HCNC,CQ

## 2022-02-04 PROCEDURE — 86340 INTRINSIC FACTOR ANTIBODY: CPT | Mod: HCNC

## 2022-02-04 PROCEDURE — 27000207 HC ISOLATION: Mod: HCNC

## 2022-02-04 PROCEDURE — 86920 COMPATIBILITY TEST SPIN: CPT | Mod: HCNC

## 2022-02-04 PROCEDURE — 80053 COMPREHEN METABOLIC PANEL: CPT | Mod: HCNC | Performed by: STUDENT IN AN ORGANIZED HEALTH CARE EDUCATION/TRAINING PROGRAM

## 2022-02-04 PROCEDURE — 85025 COMPLETE CBC W/AUTO DIFF WBC: CPT | Mod: 91,HCNC

## 2022-02-04 PROCEDURE — 63600175 PHARM REV CODE 636 W HCPCS: Mod: HCNC | Performed by: STUDENT IN AN ORGANIZED HEALTH CARE EDUCATION/TRAINING PROGRAM

## 2022-02-04 PROCEDURE — P9021 RED BLOOD CELLS UNIT: HCPCS | Mod: HCNC

## 2022-02-04 PROCEDURE — 85025 COMPLETE CBC W/AUTO DIFF WBC: CPT | Mod: HCNC | Performed by: STUDENT IN AN ORGANIZED HEALTH CARE EDUCATION/TRAINING PROGRAM

## 2022-02-04 PROCEDURE — 36415 COLL VENOUS BLD VENIPUNCTURE: CPT | Mod: HCNC | Performed by: STUDENT IN AN ORGANIZED HEALTH CARE EDUCATION/TRAINING PROGRAM

## 2022-02-04 PROCEDURE — 97530 THERAPEUTIC ACTIVITIES: CPT | Mod: HCNC

## 2022-02-04 PROCEDURE — 82728 ASSAY OF FERRITIN: CPT | Mod: HCNC

## 2022-02-04 PROCEDURE — 99233 PR SUBSEQUENT HOSPITAL CARE,LEVL III: ICD-10-PCS | Mod: HCNC,,, | Performed by: HOSPITALIST

## 2022-02-04 PROCEDURE — 25000003 PHARM REV CODE 250: Mod: HCNC | Performed by: STUDENT IN AN ORGANIZED HEALTH CARE EDUCATION/TRAINING PROGRAM

## 2022-02-04 PROCEDURE — 82607 VITAMIN B-12: CPT | Mod: HCNC

## 2022-02-04 PROCEDURE — 82746 ASSAY OF FOLIC ACID SERUM: CPT | Mod: HCNC

## 2022-02-04 PROCEDURE — 83735 ASSAY OF MAGNESIUM: CPT | Mod: HCNC | Performed by: STUDENT IN AN ORGANIZED HEALTH CARE EDUCATION/TRAINING PROGRAM

## 2022-02-04 PROCEDURE — 83921 ORGANIC ACID SINGLE QUANT: CPT | Mod: HCNC

## 2022-02-04 PROCEDURE — 84100 ASSAY OF PHOSPHORUS: CPT | Mod: HCNC | Performed by: STUDENT IN AN ORGANIZED HEALTH CARE EDUCATION/TRAINING PROGRAM

## 2022-02-04 PROCEDURE — 25000003 PHARM REV CODE 250: Mod: HCNC

## 2022-02-04 PROCEDURE — 94761 N-INVAS EAR/PLS OXIMETRY MLT: CPT | Mod: HCNC

## 2022-02-04 PROCEDURE — 36415 COLL VENOUS BLD VENIPUNCTURE: CPT | Mod: HCNC

## 2022-02-04 PROCEDURE — 84466 ASSAY OF TRANSFERRIN: CPT | Mod: HCNC

## 2022-02-04 PROCEDURE — 85045 AUTOMATED RETICULOCYTE COUNT: CPT | Mod: HCNC

## 2022-02-04 PROCEDURE — 97535 SELF CARE MNGMENT TRAINING: CPT | Mod: HCNC

## 2022-02-04 PROCEDURE — C9399 UNCLASSIFIED DRUGS OR BIOLOG: HCPCS | Mod: HCNC

## 2022-02-04 RX ORDER — MORPHINE SULFATE 15 MG/1
15 TABLET ORAL EVERY 4 HOURS PRN
Status: DISCONTINUED | OUTPATIENT
Start: 2022-02-04 | End: 2022-02-07

## 2022-02-04 RX ORDER — CLOPIDOGREL BISULFATE 75 MG/1
75 TABLET ORAL DAILY
Status: DISCONTINUED | OUTPATIENT
Start: 2022-02-05 | End: 2022-02-10 | Stop reason: HOSPADM

## 2022-02-04 RX ORDER — HYDROCODONE BITARTRATE AND ACETAMINOPHEN 5; 325 MG/1; MG/1
1 TABLET ORAL EVERY 6 HOURS PRN
Status: DISCONTINUED | OUTPATIENT
Start: 2022-02-04 | End: 2022-02-10

## 2022-02-04 RX ORDER — HYDROCODONE BITARTRATE AND ACETAMINOPHEN 500; 5 MG/1; MG/1
TABLET ORAL
Status: DISCONTINUED | OUTPATIENT
Start: 2022-02-04 | End: 2022-02-07

## 2022-02-04 RX ADMIN — INSULIN DETEMIR 10 UNITS: 100 INJECTION, SOLUTION SUBCUTANEOUS at 10:02

## 2022-02-04 RX ADMIN — MEROPENEM 2 G: 1 INJECTION INTRAVENOUS at 12:02

## 2022-02-04 RX ADMIN — SERTRALINE HYDROCHLORIDE 50 MG: 50 TABLET ORAL at 09:02

## 2022-02-04 RX ADMIN — PREGABALIN 75 MG: 75 CAPSULE ORAL at 09:02

## 2022-02-04 RX ADMIN — MORPHINE SULFATE 15 MG: 15 TABLET ORAL at 02:02

## 2022-02-04 RX ADMIN — MORPHINE SULFATE 2 MG: 2 INJECTION, SOLUTION INTRAMUSCULAR; INTRAVENOUS at 04:02

## 2022-02-04 RX ADMIN — RIVAROXABAN 2.5 MG: 2.5 TABLET, FILM COATED ORAL at 05:02

## 2022-02-04 RX ADMIN — INSULIN ASPART 5 UNITS: 100 INJECTION, SOLUTION INTRAVENOUS; SUBCUTANEOUS at 11:02

## 2022-02-04 RX ADMIN — ATORVASTATIN CALCIUM 80 MG: 40 TABLET, FILM COATED ORAL at 10:02

## 2022-02-04 RX ADMIN — MORPHINE SULFATE 15 MG: 15 TABLET ORAL at 10:02

## 2022-02-04 RX ADMIN — SENNOSIDES 8.6 MG: 8.6 TABLET ORAL at 09:02

## 2022-02-04 RX ADMIN — INSULIN ASPART 5 UNITS: 100 INJECTION, SOLUTION INTRAVENOUS; SUBCUTANEOUS at 08:02

## 2022-02-04 RX ADMIN — HYDROCODONE BITARTRATE AND ACETAMINOPHEN 1 TABLET: 5; 325 TABLET ORAL at 09:02

## 2022-02-04 NOTE — ASSESSMENT & PLAN NOTE
- H and H low with Hemoglobin 6.5-6.8. HM to transfuse 1 unit.  -Monitor CBC after transfusion is complete and ensure H and H is stable for 24 hours before rehab transfer.  -Recommend check for Occult blood stool.  - Recommend iron studies blood work.

## 2022-02-04 NOTE — ASSESSMENT & PLAN NOTE
Patient has myoclonal jerks on exam  - likely in setting of pregabalin build with ROSLYN  - improving with holding meds    Plan  - Hold mirtazipine and lyrica; restart mirtazipine first  - follow up with PCP  - neuro checks  - delirium precautions

## 2022-02-04 NOTE — HOSPITAL COURSE
PT- 02/03    Functional Mobility:  Bed Mobility:     Supine to Sit: contact guard assistance   Sit to Supine: minimum assistance for LE management  Transfers:     Bed to Chair: minimum assistance with none for scooting  using  Scooting  Balance:   Sitting: FAIR+: Maintains balance through MINIMAL excursions of active trunk motion    OT- 02/03    Bed Mobility:    Patient completed Rolling/Turning to Left with  independence  Patient completed Rolling/Turning to Right with independence  Patient completed Supine to Sit with independence and long sit  Patient completed Sit to Supine with independence           Activities of Daily Living:  Feeding:  independence    Grooming: independence    Upper Body Dressing: stand by assistance with gown in long sit  Lower Body Dressing: moderate assistance as per patient report    2/9/22: Participated w/ OT. Bed mobility SV. All adl's SV.

## 2022-02-04 NOTE — SUBJECTIVE & OBJECTIVE
Past Medical History:   Diagnosis Date    Anxiety     Chronic pain syndrome     CKD (chronic kidney disease), stage III     Depression     Diabetes mellitus     type 2    Diabetes mellitus, type 2     GERD (gastroesophageal reflux disease)     Hyperemesis 3/23/2021    Hyperlipemia     Hypertension     Hypokalemia 3/23/2021    Myocardial infarction 2010    minor-caused by stress per pt.    Osteomyelitis     Osteomyelitis of left foot 4/30/2021    PVD (peripheral vascular disease)     Vaginal delivery     x1     Past Surgical History:   Procedure Laterality Date    ABOVE-KNEE AMPUTATION Left 5/18/2021    Procedure: AMPUTATION, ABOVE KNEE;  Surgeon: Teddy Huber MD;  Location: [facility] OR [address] [place] [address];  Service: Vascular;  Laterality: Left;    Angiogram - Right Extremity Right 7/9/15    angiogram-left leg  10/6/15    ANGIOGRAPHY OF LOWER EXTREMITY Left 4/29/2021    Procedure: ANGIOGRAM, LOWER EXTREMITY;  Surgeon: Teddy Huber MD;  Location: [facility] OR [address] [place] [address];  Service: Vascular;  Laterality: Left;    BELOW KNEE AMPUTATION OF LOWER EXTREMITY Right 12/28/2021    Procedure: AMPUTATION, BELOW KNEE;  Surgeon: Kaitlyn Rojas MD;  Location: [facility];  Service: General;  Laterality: Right;    CATHETERIZATION OF BOTH LEFT AND RIGHT HEART N/A 12/18/2019    Procedure: CATHETERIZATION, HEART, BOTH LEFT AND RIGHT;  Surgeon: Que Fernando III, MD;  Location: [facility] CATH LAB;  Service: Cardiology;  Laterality: N/A;    CORONARY ANGIOGRAPHY N/A 12/18/2019    Procedure: ANGIOGRAM, CORONARY ARTERY;  Surgeon: Que Fernando III, MD;  Location: [facility] CATH LAB;  Service: Cardiology;  Laterality: N/A;    CORONARY ANGIOGRAPHY INCLUDING BYPASS GRAFTS WITH CATHETERIZATION OF LEFT HEART N/A 7/28/2020    Procedure: ANGIOGRAM, CORONARY, INCLUDING BYPASS GRAFT, WITH LEFT HEART CATHETERIZATION, 9 am;  Surgeon: Rachel Easley MD;  Location: [facility] CATH LAB;  Service: Cardiology;  Laterality: N/A;    CORONARY  ARTERY BYPASS GRAFT (CABG) N/A 1/14/2020    Procedure: CORONARY ARTERY BYPASS GRAFT (CABG) x 1     Off Pump;  Surgeon: Huang Altamirano MD;  Location: [facility] OR [address] [place] [address];  Service: Cardiovascular;  Laterality: N/A;    CREATION OF FEMORAL-TIBIAL ARTERY BYPASS Left 4/29/2021    Procedure: CREATION, BYPASS, ARTERIAL, FEMORAL TO ANTERIOR TIBIAL;  Surgeon: Teddy Huber MD;  [facility];  Service: Vascular;  Laterality: Left;    CREATION OF FEMOROPOPLITEAL ARTERIAL BYPASS USING GRAFT Left 8/18/2020    Procedure: CREATION, BYPASS, ARTERIAL, FEMORAL TO POPLITEAL, USING GRAFT, LEFT LOWER EXTREMITY;  Surgeon: Teddy Huber MD;  Location: [facility] OR;  Service: Vascular;  Laterality: Left;  REQUEST 7:15 A.M. START----COVID NEGATIVE ON 8/17 1ST CASE STARTKENYA DUEÑAS ON 8/7/2020 @ 942AM-  RN PREOP 8/12/2020   T/S-----CLEARED BY CARDS-------PENDING INSURANCE    DEBRIDEMENT OF FOOT Left 9/8/2020    Procedure: DEBRIDEMENT, FOOT;  Surgeon: Rosio Mayes DPM;  Location: [facility] OR;  Service: Podiatry;  Laterality: Left;  request neoxx .   RN Pre Op 9-4-2020, Covid negative on 9/5/20. C A    DEBRIDEMENT OF FOOT  3/4/2021    Procedure: DEBRIDEMENT, FOOT;  Surgeon: Teddy Huber MD;  Location: [facility] OR;  Service: Vascular;;    DEBRIDEMENT OF FOOT Left 3/9/2021    Procedure: DEBRIDEMENT, FOOT, bone biopsy;  Surgeon: Rosio Mayes DPM;  Location: [facility] OR;  Service: Podiatry;  Laterality: Left;  Request neoxx---COVID IN AM  REQUESTING NOON START  RN Phone Pre op.On Blood thinners Plavix and Eliquis.  Covid am of surgery. C A    DEBRIDEMENT OF FOOT Left 5/4/2021    Procedure: DEBRIDEMENT, FOOT;  Surgeon: Farooq Morley DPM;  [facility];  Service: Podiatry;  Laterality: Left;    INSERTION OF TUNNELED CENTRAL VENOUS HEMODIALYSIS CATHETER N/A 1/27/2020    Procedure: Insertion, Catheter, Central Venous, Hemodialysis;  Surgeon: ESTEBAN Gomez III, MD;  Location: [facility] CATH LAB;   Service: Peripheral Vascular;  Laterality: N/A;    PERCUTANEOUS TRANSLUMINAL ANGIOPLASTY N/A 3/4/2021    Procedure: PTA (ANGIOPLASTY, PERCUTANEOUS, TRANSLUMINAL);  Surgeon: Teddy Huber MD;  Location: [facility] OR;  Service: Vascular;  Laterality: N/A;    REMOVAL OF ARTERIOVENOUS GRAFT Left 5/27/2021    Procedure: REMOVAL, GRAFT, LEFT LOWER EXTREMITY, WOUND EXPLORATION;  Surgeon: Teddy Huber MD;  Location: [facility] OR 2ND FLR;  Service: Vascular;  Laterality: Left;    REMOVAL OF NAIL OF DIGIT Left 3/9/2021    Procedure: REMOVAL, NAIL, DIGIT;  Surgeon: Rosio Mayes DPM;  Location: [facility] OR;  Service: Podiatry;  Laterality: Left;    THROMBECTOMY Left 3/4/2021    Procedure: THROMBECTOMY, LEFT LOWER EXTREMITY BYPASS GRAFT, ANGIOGRAM, POSSIBLE INTERVENTION, POSSIBLE LEFT LOWER EXTREMITY BYPASS;  Surgeon: Teddy Huber MD;  Location: [facility] OR;  Service: Vascular;  Laterality: Left;    THROMBECTOMY Left 4/29/2021    Procedure: GRAFT THROMBECTOMY, LEFT LOWER EXTREMITY;  Surgeon: Teddy Huber MD;  Location: [facility] OR 2ND FLR;  Service: Vascular;  Laterality: Left;  14.5 min  1179.85 mGy  341.01 Gycm2  240 ml dye    THROMBECTOMY  10/22/2021    Procedure: THROMBECTOMY;  Surgeon: Saad Arenas MD;  Location: [facility] CATH LAB/EP;  Service: Cardiology;;     Review of patient's allergies indicates:   Allergen Reactions    Ciprofloxacin Itching    Contrast media      Kidney injury    Iodine      Kidney injury       Scheduled Medications:    atorvastatin  80 mg Oral QHS    insulin aspart U-100  5 Units Subcutaneous TIDWM    insulin detemir U-100  10 Units Subcutaneous QHS    meropenem (MERREM) IVPB  2 g Intravenous Q12H    polyethylene glycol  17 g Oral Daily    senna  8.6 mg Oral Daily    sertraline  50 mg Oral Daily       PRN Medications: sodium chloride, acetaminophen, albuterol sulfate, dextrose 50%, dextrose 50%, glucagon (human recombinant), glucose, glucose, hydrALAZINE,  HYDROcodone-acetaminophen, insulin aspart U-100, melatonin, morphine, naloxone, ondansetron, sodium chloride 0.9%    Family History     Problem Relation (Age of Onset)    Diabetes Mother, Father, Paternal Grandmother    Heart disease Maternal Grandmother    No Known Problems Maternal Grandfather, Paternal Grandfather        Tobacco Use    Smoking status: Former Smoker    Smokeless tobacco: Never Used   Substance and Sexual Activity    Alcohol use: No    Drug use: Yes     Types: Marijuana     Comment: occassional    Sexual activity: Yes     Partners: Male     Review of Systems   Constitutional: Positive for activity change.   HENT: Negative.    Eyes: Negative.    Respiratory: Negative.    Cardiovascular: Negative.    Gastrointestinal: Negative.    Endocrine: Negative.    Genitourinary: Negative.    Musculoskeletal: Positive for gait problem.   Skin: Positive for wound.        LBKA wound with DSG intact.    Neurological: Positive for weakness.   Psychiatric/Behavioral: Negative.      Objective:     Vital Signs (Most Recent):  Temp: 97.7 °F (36.5 °C) (02/04/22 0936)  Pulse: 105 (02/04/22 0936)  Resp: 18 (02/04/22 0936)  BP: 123/60 (02/04/22 0936)  SpO2: 95 % (02/04/22 0936)    Vital Signs (24h Range):  Temp:  [97.6 °F (36.4 °C)-98.4 °F (36.9 °C)] 97.7 °F (36.5 °C)  Pulse:  [] 105  Resp:  [16-19] 18  SpO2:  [94 %-98 %] 95 %  BP: (123-174)/(60-82) 123/60     Body mass index is 24.8 kg/m².    Physical Exam  Vitals and nursing note reviewed.   Constitutional:       Appearance: Normal appearance.   HENT:      Head: Atraumatic.   Pulmonary:      Effort: Pulmonary effort is normal.   Abdominal:      Palpations: Abdomen is soft.   Musculoskeletal:      Cervical back: Normal range of motion.   Skin:     General: Skin is warm and dry.   Neurological:      General: No focal deficit present.      Mental Status: She is alert and oriented to person, place, and time.      GCS: GCS eye subscore is 4. GCS verbal subscore is  5. GCS motor subscore is 6.      Motor: Weakness present.      Coordination: Coordination abnormal. Heel to Bonilla Test abnormal.      Gait: Gait abnormal.   Psychiatric:         Mood and Affect: Mood normal.         Behavior: Behavior normal.       NEUROLOGICAL EXAMINATION:     MENTAL STATUS   Oriented to person, place, and time.       Diagnostic Results: Labs reviewed.

## 2022-02-04 NOTE — HPI
"Suyapa Connelly is a 57 y/o female with PMHx CKD3, DMII, GERD, HLD, HTN, osteomyelitis, PVD, depression, and anxiety presents to ED with  for concerns of increased confusion from her rehab facility.  at bedside is concern for progressively worsening appearance of RLE wound, along with continued drainage, and worsening mental status of his wife which has progressed through this week. Patient emotionally labile and responds yes or no to most questions. She reports increased pain in her R leg with nausea over the past week. She also reports constipation without abdominal pain. She denies dysuria. Patient denies fevers, chills, vomiting, chest pain, cough, and shortness of breath.      Patient underwent right BKA by Dr. Rojas on 12/28 then discharged to rehab facility on 1/10. Since then she has recurrent hospital visits for the wound, requiring revisions and debridements; the last resulted in a pseudomonas positive wound culture resistant to cefepime 1/20. [facility] rehab RN stated that patient was "very confused this morning and more lethargic than normal." Per rehab, pt is normally AOx3 and able to follow commands. Rehab states they were concerned for sepsis due to elevated WBC and ROSLYN. Per , her last known normal was 1 weeks ago. Patient was admitted to hospital 1/31 for AMS and concern about worsening appearance of RLE wound. Patient was started on antibiotics (cefepime, vancomycin & azithromycin), blood, wound, resp & urine samples were collected for culture due to concern for sepsis and ID and Gen Surg (s/p BKA on 12/28) were consulted. Pregabalin was withheld due to patient's myoclonus, likely 2/2 to ROSLYN leading to increased serum concentrations. Patient's mental status slowly improving throughout hospitalization, likely AMS due to both pregabalin intox and sepsis. ROSLYN resolved with IVF and abx. Increasing limb pain, pregabalin restarted at low dose. Azithromycin and vanc discontinued and " continuing with meropenem. ID with final recs to continue through the 7th with meropenem. PM & R was consulted to evaluate pt's return to [facility].     Functional History: Prior level of function: Patient has been in and out of hospital multiple times past few weeks with infection.  Before R BKA, patient had been tx to wheelchair and commode with supervision from spouse.   DME: wheelchair,hospital bed,bath bench,bedside commode

## 2022-02-04 NOTE — ASSESSMENT & PLAN NOTE
HPatient with acute kidney injury likely d/t IVVD/Dehydration Which is currently worsening. Labs reviewed- Renal function/electrolytes with Estimated Creatinine Clearance: 40.9 mL/min (based on SCr of 1.4 mg/dL). according to latest data. Monitor urine output and serial BMP and adjust therapy as needed. Avoid nephrotoxins and renally dose meds for GFR listed above.   - improved with IVF, likely pre renal    Plan  - strict I/Os  - renally dose meds  - avoid nephrotoxic agents  - RESOLVED

## 2022-02-04 NOTE — PT/OT/SLP PROGRESS
"Physical Therapy Treatment    Patient Name:  Suyapa Connelly   MRN:  [id number]    Recommendations:     Discharge Recommendations:  rehabilitation facility   Discharge Equipment Recommendations: none   Barriers to discharge: Decreased caregiver support    Assessment:     Suyapa Connelly is a 56 y.o. female admitted with a medical diagnosis of Severe sepsis.  She presents with the following impairments/functional limitations:  impaired self care skills,impaired functional mobilty.    Patient eager to work with therapy. She transferred to EOB with CGA. Scooted EOB>Drop arm av bedside commode with CGA/Min a and Mod/Max a back to EOB. Will continue PT per pOC.     Rehab Prognosis: Good; patient would benefit from acute skilled PT services to address these deficits and reach maximum level of function.    Recent Surgery: * No surgery found *      Plan:     During this hospitalization, patient to be seen 4 x/week to address the identified rehab impairments via gait training,therapeutic activities,therapeutic exercises and progress toward the following goals:    · Plan of Care Expires:  03/05/22    Subjective     Chief Complaint: pain RLE stump  Patient/Family Comments/goals: "I just realized that I will not have privacy when I need to use the bathroom, I cannot pee, I feel like I have to but I can't"  Pain/Comfort:  · Pain Rating 1: 3/10  · Location - Side 1: Right  · Location - Orientation 1: generalized  · Location 1: leg  · Pain Addressed 1: Pre-medicate for activity      Objective:     Communicated with Nurse prior to session.  Patient found supine with telemetry upon PT entry to room.     General Precautions: Standard, fall   Orthopedic Precautions:N/A   Braces: N/A  Respiratory Status: Room air     Functional Mobility:  · Bed Mobility:     · Scooting: minimum assistance  · Supine to Sit: contact guard assistance  · Sit to Supine: contact guard assistance  · Transfers:     · Bed to bedside commode: minimum " assistance with  no AD  using  Scooting method without slide board, bedside commode>eob with Mod/Max a. Patient did have bm and was able to perform rico care on her own      AM-PAC 6 CLICK MOBILITY  Turning over in bed (including adjusting bedclothes, sheets and blankets)?: 3  Sitting down on and standing up from a chair with arms (e.g., wheelchair, bedside commode, etc.): 1  Moving from lying on back to sitting on the side of the bed?: 3  Moving to and from a bed to a chair (including a wheelchair)?: 3  Need to walk in hospital room?: 1  Climbing 3-5 steps with a railing?: 1  Basic Mobility Total Score: 12       Therapeutic Activities and Exercises:   -white board updated  -Patient tearful and spouse very supportive  -increased time as patient having difficulty urinating and BM took some time    Patient left supine with all lines intact, call button in reach and family and MD present..    GOALS:   Multidisciplinary Problems     Physical Therapy Goals        Problem: Physical Therapy Goal    Goal Priority Disciplines Outcome Goal Variances Interventions   Physical Therapy Goal     PT, PT/OT Ongoing, Progressing     Description: Goals to be met by: 22    Patient will increase functional independence with mobility by performin. Supine to sit with Set-up Dillon  2. Sit to supine with Set-up Dillon  3. Patient will demonstrate independence with a home exercise program  4. Bed to chair transfer with Stand-by Assistance using No Assistive Device  5. Wheelchair propulsion x250 feet with Stand-by Assistance using bilateral uppper extremities                   Time Tracking:     PT Received On: 22  PT Start Time: 1518     PT Stop Time: 1610  PT Total Time (min): 52 min     Billable Minutes: Therapeutic Activity 52    Treatment Type: Treatment  PT/PTA: PTA     PTA Visit Number: 2022

## 2022-02-04 NOTE — PLAN OF CARE
Pt is A+Ox4. Expresses concerns on her life once at home after her surgery. Pt continuously c/o of pain between 6-10. Morphine and oxy are q 6. Once pt receives pain medication pain is between 3 and 4.

## 2022-02-04 NOTE — ASSESSMENT & PLAN NOTE
Patient on melatonin, remeron, zoloft     Plan  - restart zoloft 50mg  - PRN melatonin  - Hold remeron 2/4 due to myoclonus, will restart this first before lyrica

## 2022-02-04 NOTE — ASSESSMENT & PLAN NOTE
Patient has Hgb of 6.6 on admission    Plan  - transfuse 1U pRBC  - Transfuse another 1u pRBC on 2/4.   - Anemia workup with iron, TIBC, b12 panel and FOBT. No apparent sources of bleeding.  - discontinue clopidogrel and apixaban  - CBC daily, transfuse Hgb < 7

## 2022-02-04 NOTE — PT/OT/SLP PROGRESS
"Occupational Therapy   Treatment    Name: Suyapa Connelly  MRN: [id number]  Admitting Diagnosis:  Severe sepsis       Recommendations:     Discharge Recommendations: rehabilitation facility  Discharge Equipment Recommendations:  none  Barriers to discharge:  None    Assessment:     Suyapa Connelly is a 56 y.o. female with a medical diagnosis of Severe sepsis.  She presents with spouse and mother in law in room. Performance deficits affecting function are weakness,impaired functional mobilty,impaired endurance,impaired cardiopulmonary response to activity,pain,impaired balance,impaired sensation,impaired self care skills,decreased lower extremity function. Question answered about discharge recommendations and current functional level. Patient reports decreased pain, she completed bed mobility and long sit with no assist nor increase in pain. UE arom in all planes for grossly 10 minutes.     Rehab Prognosis:  Good; patient would benefit from acute skilled OT services to address these deficits and reach maximum level of function.       Plan:     Patient to be seen 3 x/week to address the above listed problems via self-care/home management,therapeutic activities,therapeutic exercises  · Plan of Care Expires: 03/01/22  · Plan of Care Reviewed with: patient,spouse    Subjective   "My pain is better today, I am looking forward to return to rehab."  Pain/Comfort:  · Pain Rating 1: 3/10  · Location - Side 1: Right  · Location 1: leg  · Pain Addressed 1: Pre-medicate for activity  · Pain Rating Post-Intervention 1: 3/10    Objective:     Communicated with: RN prior to session.  Patient found Head of bed elevated spouse and mother in law in room with telemetry upon OT entry to room.    General Precautions: Standard, fall   Orthopedic Precautions:N/A   Braces: N/A  Respiratory Status: Room air     Occupational Performance:     Bed Mobility:    · Patient completed Rolling/Turning to Left with  independence  · Patient completed " Rolling/Turning to Right with independence  · Patient completed Supine to Sit with independence       ·     Activities of Daily Living:  · Feeding:  independence head of bed elevated  · Grooming: independence seated in bed      AMPAC 6 Click ADL: 19    Treatment & Education:  Education on discharge recommendations    Patient left HOB elevated with all lines intact, call button in reach and family presentEducation:      GOALS:   Multidisciplinary Problems     Occupational Therapy Goals        Problem: Occupational Therapy Goal    Goal Priority Disciplines Outcome Interventions   Occupational Therapy Goal     OT, PT/OT Ongoing, Progressing    Description: Goals to be met by: 03/01/22    Patient will increase functional independence with ADLs by performing:    LE Dressing with Stand-by Assistance.  Toileting from bedside commode with Set-up Assistance for hygiene and clothing management.   Sliding board transfers with min (A) to drop arm BSC                   Time Tracking:     OT Date of Treatment: 02/04/22  OT Start Time: 1151  OT Stop Time: 1214  OT Total Time (min): 23 min    Billable Minutes:Self Care/Home Management 12  Therapeutic Activity 11    OT/MACKENZIE: OT          2/4/2022

## 2022-02-04 NOTE — PLAN OF CARE
02/04/22 1507   Discharge Reassessment   Assessment Type Discharge Planning Reassessment   Did the patient's condition or plan change since previous assessment? No   Discharge Plan discussed with: Patient   Communicated DEVAN with patient/caregiver Date not available/Unable to determine   Discharge Plan A Rehab   Discharge Plan B Rehab   DME Needed Upon Discharge  other (see comments)  (TBD)   Discharge Barriers Identified None   Why the patient remains in the hospital Requires continued medical care   Post-Acute Status   Post-Acute Authorization Placement   Post-Acute Placement Status Referrals Sent   Coverage [employer]   Discharge Delays None known at this time     Patient not medically  stable for DC to rehab. Will continue to follow.    Tammy Will, IKER  [phone]

## 2022-02-04 NOTE — PROGRESS NOTES
"[facility] Medicine  Progress Note    Patient Name: Suyapa Connelly  MRN: [id number]  Patient Class: IP- Inpatient   Admission Date: 1/31/2022  Length of Stay: 3 days  Attending Physician: Flavia Delgado MD  Primary Care Provider: Magen Christensen MD        Subjective:     Principal Problem:Severe sepsis        HPI:  Suyapa Connelly is a 55 y/o female with PMHx CKD3, DMII, GERD, HLD, HTN, osteomyelitis, PVD, depression, and anxiety presents to ED with  for concerns of increased confusion from her rehab facility.  at bedside is concern for progressively worsening appearance of RLE wound, along with continued drainage, and worsening mental status of his wife which has progressed through this week. Patient emotionally labile and responds yes or no to most questions. She reports increased pain in her R leg with nausea over the past week. She also reports constipation without abdominal pain. She denies dysuria. Patient denies fevers, chills, vomiting, chest pain, cough, and shortness of breath.     Patient underwent right BKA by Dr. Rojas on 12/28 then discharged to rehab facility on 1/10. Since then she has recurrent hospital visits for the wound, requiring revisions and debridements; the last resulted in a pseudomonas positive wound culture resistant to cefepime 1/20. [facility] rehab RN stated that patient was "very confused this morning and more lethargic than normal." Per rehab, pt is normally AOx3 and able to follow commands. Rehab states they were concerned for sepsis due to elevated WBC and ROSLYN. Per , her last known normal was 1 weeks ago.    She was admitted to hospital medicine for severe sepsis.       Overview/Hospital Course:  Patient was admitted to hospital 1/31 for AMS and concern about worsening appearance of RLE wound. Patient was started on antibiotics (cefepime, vancomycin & azithromycin), blood, wound, resp & urine samples were collected for culture due to " concern for sepsis and ID and Gen Surg (s/p BKA on 12/28) were consulted. Pregabalin was withheld due to patient's myoclonus, likely 2/2 to ROSLYN leading to increased serum concentrations. Patient's mental status slowly improving throughout hospitalization, likely AMS due to both pregabalin intox and sepsis. ROSLYN resolved with IVF and abx. Increasing limb pain, pregabalin restarted at low dose. Azithromycin and vanc discontinued and continuing with meropenem. ID with final recs to continue through the 7th with meropenem, patient medically ready to return to rehab once Hgb stabilizes and after transfusion. Holding pregabalin and mirtazapine due to return of myoclonus.      Interval History: Symptoms:  Improved.   Diet:  Adequate intake.    Activity level:  Impaired due to weakness.  Impaired due to pain.  Pain: Poorly controlled, requiring oral pain medication.      Review of Systems   Constitutional: Negative for chills and fever.   HENT: Negative for congestion and rhinorrhea.    Eyes: Negative for pain and visual disturbance.   Respiratory: Negative for chest tightness and shortness of breath.    Cardiovascular: Negative for chest pain and palpitations.   Gastrointestinal: Positive for constipation. Negative for abdominal distention, abdominal pain, diarrhea, nausea and vomiting.   Genitourinary: Negative for difficulty urinating and dysuria.   Musculoskeletal: Negative for back pain and neck pain.        Right BKA wound pain   Skin: Negative for rash and wound.   Neurological: Negative for dizziness and headaches.   Psychiatric/Behavioral: Negative for confusion and sleep disturbance.     Objective:     Vital Signs (Most Recent):  Temp: 97.7 °F (36.5 °C) (02/04/22 0936)  Pulse: 105 (02/04/22 0936)  Resp: 18 (02/04/22 0936)  BP: 123/60 (02/04/22 0936)  SpO2: 95 % (02/04/22 0936) Vital Signs (24h Range):  Temp:  [97.6 °F (36.4 °C)-98.4 °F (36.9 °C)] 97.7 °F (36.5 °C)  Pulse:  [] 105  Resp:  [16-19] 18  SpO2:   [94 %-98 %] 95 %  BP: (123-174)/(60-82) 123/60     Weight: 67.6 kg (149 lb 0.5 oz)  Body mass index is 24.8 kg/m².  No intake or output data in the 24 hours ending 02/04/22 1317   Physical Exam  Vitals and nursing note reviewed.   Constitutional:       General: She is not in acute distress.     Appearance: Normal appearance. She is not ill-appearing or diaphoretic.   HENT:      Head: Normocephalic and atraumatic.      Right Ear: External ear normal.      Left Ear: External ear normal.      Nose: Nose normal. No congestion or rhinorrhea.      Mouth/Throat:      Mouth: Mucous membranes are moist.      Pharynx: Oropharynx is clear.   Eyes:      General: No scleral icterus.     Extraocular Movements: Extraocular movements intact.      Pupils: Pupils are equal, round, and reactive to light.   Cardiovascular:      Rate and Rhythm: Normal rate and regular rhythm.      Pulses: Normal pulses.      Heart sounds: Normal heart sounds.   Pulmonary:      Effort: Pulmonary effort is normal.      Breath sounds: Normal breath sounds. No wheezing, rhonchi or rales.   Abdominal:      General: Bowel sounds are normal. There is no distension.      Palpations: Abdomen is soft.      Tenderness: There is no abdominal tenderness. There is no right CVA tenderness or left CVA tenderness.   Musculoskeletal:         General: Normal range of motion.      Cervical back: Normal range of motion and neck supple. No tenderness.      Comments: Bilateral BKA, wound with slight blood draining on R   Lymphadenopathy:      Cervical: No cervical adenopathy.   Skin:     General: Skin is warm and dry.      Findings: No rash.      Nails: There is clubbing.   Neurological:      Mental Status: She is alert and oriented to person, place, and time.      Comments: Myoclonic jerks noticed in upper extremities and abdomen.   Psychiatric:         Mood and Affect: Mood normal.         Behavior: Behavior is slowed.         Thought Content: Thought content normal.          Judgment: Judgment normal.      Comments:           Significant Labs:   All pertinent labs within the past 24 hours have been reviewed.  CBC:   Recent Labs   Lab 02/03/22  0923 02/04/22  0251 02/04/22  0740   WBC 9.40 7.35 7.52   HGB 7.4* 6.5* 6.8*   HCT 22.5* 21.6* 22.1*    406 407     CMP:   Recent Labs   Lab 02/03/22  0507 02/04/22  0251    138   K 4.6 4.8    103   CO2 30* 26   GLU 94 77   BUN 51* 53*   CREATININE 1.5* 1.4   CALCIUM 9.0 8.9   PROT 6.8 6.8   ALBUMIN 1.6* 1.7*   BILITOT 0.3 0.2   ALKPHOS 100 92   AST 32 23   ALT 17 13   ANIONGAP 7* 9   EGFRNONAA 38.9* 42.3*     Magnesium:   Recent Labs   Lab 02/03/22  0507 02/04/22  0251   MG 2.4 2.3       Significant Imaging: I have reviewed all pertinent imaging results/findings within the past 24 hours.           Assessment/Plan:      * Severe sepsis  Ms. Connelly is a 55 yo female s/p r BKA and hx of Pseudomonas infection at the site of surgery presenting with AMS, leukocytosis, and elevated procalc  - likely sepsis due to wound infection/OM  - ESR and CRP slightly elevated  - Procalc of 0.38  -MRI Sacrum and R leg without evidence of OM  - Gen Surg following given repeated wound infection s/p BKA, no surgical intervention, signing off  - blood cx x2, wound cx, resp cx NGTD  - urine cx NGTD to r/o other source     Plan  - wound care consulted  - Inflammatory markers elevated, confirming likely source is wound  - CBC daily to trend WCC  - meropenem 2g q12 until 2/7 per ID recs        Myoclonus  Patient has myoclonal jerks on exam  - likely in setting of pregabalin build with ROSLYN  - improving with holding meds    Plan  - Hold mirtazipine and lyrica; restart mirtazipine first  - follow up with PCP  - neuro checks  - delirium precautions      ROSLYN (acute kidney injury)  HPatient with acute kidney injury likely d/t IVVD/Dehydration Which is currently worsening. Labs reviewed- Renal function/electrolytes with Estimated Creatinine Clearance: 40.9  mL/min (based on SCr of 1.4 mg/dL). according to latest data. Monitor urine output and serial BMP and adjust therapy as needed. Avoid nephrotoxins and renally dose meds for GFR listed above.   - improved with IVF, likely pre renal    Plan  - strict I/Os  - renally dose meds  - avoid nephrotoxic agents  - RESOLVED      Altered mental state  Likely in setting of infection  - CTH negative    Plan  - neuro checks  - delirium precautions      Decubitus ulcer of sacral region, unstageable  MRI Sacrum W Out contrast (in setting of ROSLYN) without signs of osteomyelitis.    - Wound care consulted, appreciate recs  - Cleanse with cleaning cloths, apply Triad ointment BID/prn cleansing    S/P BKA (below knee amputation) unilateral, right  Patient had recent BKA at [facility] with recent debridement last week  - patient has active drainage from wound with continued pain and complications    Plan  - morphine 15 mg PO q4 PRN for severe pain  - Norco 5mg PRN q6 for moderate pain  - Wound Care consulted, appreciate recs  - Gen Surg states no surgical intervention recommended at this time.   - ID recommend to continue with meropenem through 2/7    Hypoalbuminemia  Boost glucose control TIDWM     Impaired functional mobility and endurance  PT/OT consulted, recommend returning to Rehab, awaiting acceptance    Type 2 diabetes mellitus with diabetic nephropathy, with long-term current use of insulin  Lab Results   Component Value Date    HGBA1C 9.6 (H) 12/03/2021     Plan  - Regular diet due to patient request  - MDSSI PRN with POCT glu TIDWM + QHS  - detemir 10 QD  - Aspart 5 TIDWM  - increase basal and add bolus regimen per requirements      Acute blood loss anemia  Patient has Hgb of 6.6 on admission    Plan  - transfuse 1U pRBC  - Transfuse another 1u pRBC on 2/4.   - Anemia workup with iron, TIBC, b12 panel and FOBT. No apparent sources of bleeding.  - discontinue clopidogrel and apixaban  - CBC daily, transfuse Hgb < 7      Mixed  hyperlipidemia  Patient on atorvastatin 80mg at home    Plan  - continue home med     CAROLIN (generalized anxiety disorder)  Patient on melatonin, remeron, zoloft     Plan  - restart zoloft 50mg  - PRN melatonin  - Hold remeron 2/4 due to myoclonus, will restart this first before lyrica    Moderate malnutrition  Boost TIDWM      VTE Risk Mitigation (From admission, onward)           Ordered     IP VTE HIGH RISK PATIENT  Once         01/31/22 1930     Place sequential compression device  Until discontinued         01/31/22 1930                    Discharge Planning   DEVAN: 2/4/2022     Code Status: Prior   Is the patient medically ready for discharge?: Yes    Reason for patient still in hospital (select all that apply): Patient new problem, Patient trending condition, Laboratory test, Treatment and Consult recommendations  Discharge Plan A: Rehab   Discharge Delays: None known at this time      Dominik Manley MD  Department of Hospital Medicine   [facility] - Med Surg                      02/04/2022                             STAFF PHYSICIAN NOTE                                   Attending Attestation for Rounds with Resident  I have reviewed and concur with the resident's history, physical, assessment, and plan.  I have personally interviewed and examined the patient at bedside and agree with the resident's findings.                        55 yo with hx of bilat BKA, T2DM, depression, neuropathic pain presenting with recurrent sepsis likely due to wound infection (stump vs sacral) and PNA.  Long discussion w pt and her  about her care and welfare. On meropenem until 2/7. [facility]DC. Rehab evaluation. Transfuse one unit.                  Flavia Delgado MD  Senior Hospitalist  [id number], [phone]

## 2022-02-04 NOTE — ASSESSMENT & PLAN NOTE
Lab Results   Component Value Date    HGBA1C 9.6 (H) 12/03/2021     Plan  - Regular diet due to patient request  - MDSSI PRN with POCT glu TIDWM + QHS  - detemir 10 QD  - Aspart 5 TIDWM  - increase basal and add bolus regimen per requirements

## 2022-02-04 NOTE — CONSULTS
Inpatient consult to Physical Medicine Rehab  Consult performed by: Maryann Perez NP  Consult ordered by: Tracy Blair MD        Consult received.  Reviewed patient history and current admission.  PM&R following.  Maryann Perez NP  Physical Medicine & Rehabilitation   02/04/2022

## 2022-02-04 NOTE — PLAN OF CARE
Problem: Adult Inpatient Plan of Care  Goal: Plan of Care Review  Outcome: Ongoing, Progressing  Goal: Patient-Specific Goal (Individualized)  Outcome: Ongoing, Progressing  Goal: Absence of Hospital-Acquired Illness or Injury  Outcome: Ongoing, Progressing  Goal: Optimal Comfort and Wellbeing  Outcome: Ongoing, Progressing     Problem: Diabetes Comorbidity  Goal: Blood Glucose Level Within Targeted Range  Outcome: Ongoing, Progressing     Problem: Adjustment to Illness (Sepsis/Septic Shock)  Goal: Optimal Coping  Outcome: Ongoing, Progressing     Problem: Fluid and Electrolyte Imbalance (Acute Kidney Injury/Impairment)  Goal: Fluid and Electrolyte Balance  Outcome: Ongoing, Progressing   Patient aox4, patient medicated for pain throughout shift, patient verbalized decrease of pain. Will continue to medicate patient as ordered

## 2022-02-04 NOTE — SUBJECTIVE & OBJECTIVE
Interval History: Symptoms:  Improved.   Diet:  Adequate intake.    Activity level:  Impaired due to weakness.  Impaired due to pain.  Pain: Poorly controlled, requiring oral pain medication.      Review of Systems   Constitutional: Negative for chills and fever.   HENT: Negative for congestion and rhinorrhea.    Eyes: Negative for pain and visual disturbance.   Respiratory: Negative for chest tightness and shortness of breath.    Cardiovascular: Negative for chest pain and palpitations.   Gastrointestinal: Positive for constipation. Negative for abdominal distention, abdominal pain, diarrhea, nausea and vomiting.   Genitourinary: Negative for difficulty urinating and dysuria.   Musculoskeletal: Negative for back pain and neck pain.        Right BKA wound pain   Skin: Negative for rash and wound.   Neurological: Negative for dizziness and headaches.   Psychiatric/Behavioral: Negative for confusion and sleep disturbance.     Objective:     Vital Signs (Most Recent):  Temp: 97.7 °F (36.5 °C) (02/04/22 0936)  Pulse: 105 (02/04/22 0936)  Resp: 18 (02/04/22 0936)  BP: 123/60 (02/04/22 0936)  SpO2: 95 % (02/04/22 0936) Vital Signs (24h Range):  Temp:  [97.6 °F (36.4 °C)-98.4 °F (36.9 °C)] 97.7 °F (36.5 °C)  Pulse:  [] 105  Resp:  [16-19] 18  SpO2:  [94 %-98 %] 95 %  BP: (123-174)/(60-82) 123/60     Weight: 67.6 kg (149 lb 0.5 oz)  Body mass index is 24.8 kg/m².  No intake or output data in the 24 hours ending 02/04/22 1317   Physical Exam  Vitals and nursing note reviewed.   Constitutional:       General: She is not in acute distress.     Appearance: Normal appearance. She is not ill-appearing or diaphoretic.   HENT:      Head: Normocephalic and atraumatic.      Right Ear: External ear normal.      Left Ear: External ear normal.      Nose: Nose normal. No congestion or rhinorrhea.      Mouth/Throat:      Mouth: Mucous membranes are moist.      Pharynx: Oropharynx is clear.   Eyes:      General: No scleral icterus.      Extraocular Movements: Extraocular movements intact.      Pupils: Pupils are equal, round, and reactive to light.   Cardiovascular:      Rate and Rhythm: Normal rate and regular rhythm.      Pulses: Normal pulses.      Heart sounds: Normal heart sounds.   Pulmonary:      Effort: Pulmonary effort is normal.      Breath sounds: Normal breath sounds. No wheezing, rhonchi or rales.   Abdominal:      General: Bowel sounds are normal. There is no distension.      Palpations: Abdomen is soft.      Tenderness: There is no abdominal tenderness. There is no right CVA tenderness or left CVA tenderness.   Musculoskeletal:         General: Normal range of motion.      Cervical back: Normal range of motion and neck supple. No tenderness.      Comments: Bilateral BKA, wound with slight blood draining on R   Lymphadenopathy:      Cervical: No cervical adenopathy.   Skin:     General: Skin is warm and dry.      Findings: No rash.      Nails: There is clubbing.   Neurological:      Mental Status: She is alert and oriented to person, place, and time.      Comments: Myoclonic jerks noticed in upper extremities and abdomen.   Psychiatric:         Mood and Affect: Mood normal.         Behavior: Behavior is slowed.         Thought Content: Thought content normal.         Judgment: Judgment normal.      Comments:           Significant Labs:   All pertinent labs within the past 24 hours have been reviewed.  CBC:   Recent Labs   Lab 02/03/22  0923 02/04/22  0251 02/04/22  0740   WBC 9.40 7.35 7.52   HGB 7.4* 6.5* 6.8*   HCT 22.5* 21.6* 22.1*    406 407     CMP:   Recent Labs   Lab 02/03/22  0507 02/04/22  0251    138   K 4.6 4.8    103   CO2 30* 26   GLU 94 77   BUN 51* 53*   CREATININE 1.5* 1.4   CALCIUM 9.0 8.9   PROT 6.8 6.8   ALBUMIN 1.6* 1.7*   BILITOT 0.3 0.2   ALKPHOS 100 92   AST 32 23   ALT 17 13   ANIONGAP 7* 9   EGFRNONAA 38.9* 42.3*     Magnesium:   Recent Labs   Lab 02/03/22  0507 02/04/22  0251   MG 2.4 2.3        Significant Imaging: I have reviewed all pertinent imaging results/findings within the past 24 hours.

## 2022-02-04 NOTE — ASSESSMENT & PLAN NOTE
Patient has Hgb of 6.6 on admission    Plan  - transfuse 1U pRBC  - Transfuse another 1u pRBC on 2/4.   - Anemia workup with iron, TIBC, b12 panel and FOBT. No apparent sources of bleeding.  - discontinue apixaban, and start xarelto 2.5mg BID + plavix 75 mg  - CBC daily, transfuse Hgb < 7

## 2022-02-04 NOTE — CONSULTS
"[facility] Surg  Physical Medicine & Rehab  Consult Note    Patient Name: Suyapa Connelly  MRN: [id number]  Admission Date: 1/31/2022  Hospital Length of Stay: 3 days  Attending Physician:  Flavia Delgado MD  Consults  Subjective:     Principal Problem: Severe sepsis    HPI: Suyapa Connelly is a 57 y/o female with PMHx CKD3, DMII, GERD, HLD, HTN, osteomyelitis, PVD, depression, and anxiety presents to ED with  for concerns of increased confusion from her rehab facility.  at bedside is concern for progressively worsening appearance of RLE wound, along with continued drainage, and worsening mental status of his wife which has progressed through this week. Patient emotionally labile and responds yes or no to most questions. She reports increased pain in her R leg with nausea over the past week. She also reports constipation without abdominal pain. She denies dysuria. Patient denies fevers, chills, vomiting, chest pain, cough, and shortness of breath.      Patient underwent right BKA by Dr. Rojas on 12/28 then discharged to rehab facility on 1/10. Since then she has recurrent hospital visits for the wound, requiring revisions and debridements; the last resulted in a pseudomonas positive wound culture resistant to cefepime 1/20. [facility] rehab RN stated that patient was "very confused this morning and more lethargic than normal." Per rehab, pt is normally AOx3 and able to follow commands. Rehab states they were concerned for sepsis due to elevated WBC and ROSLYN. Per , her last known normal was 1 weeks ago. Patient was admitted to hospital 1/31 for AMS and concern about worsening appearance of RLE wound. Patient was started on antibiotics (cefepime, vancomycin & azithromycin), blood, wound, resp & urine samples were collected for culture due to concern for sepsis and ID and Gen Surg (s/p BKA on 12/28) were consulted. Pregabalin was withheld due to patient's myoclonus, likely 2/2 to ROSLYN leading to " increased serum concentrations. Patient's mental status slowly improving throughout hospitalization, likely AMS due to both pregabalin intox and sepsis. ROSLYN resolved with IVF and abx. Increasing limb pain, pregabalin restarted at low dose. Azithromycin and vanc discontinued and continuing with meropenem. ID with final recs to continue through the 7th with meropenem. PM & R was consulted to evaluate pt's return to [facility].     Functional History: Prior level of function: Patient has been in and out of hospital multiple times past few weeks with infection.  Before R BKA, patient had been tx to wheelchair and commode with supervision from spouse.   DME: wheelchair,hospital bed,bath bench,bedside commode      Hospital Course: PT- 02/03    Functional Mobility:  · Bed Mobility:     · Supine to Sit: contact guard assistance   · Sit to Supine: minimum assistance for LE management  · Transfers:     · Bed to Chair: minimum assistance with none for scooting  using  Scooting  1. Balance:   ? Sitting: FAIR+: Maintains balance through MINIMAL excursions of active trunk motion    OT- 02/03    Bed Mobility:    · Patient completed Rolling/Turning to Left with  independence  · Patient completed Rolling/Turning to Right with independence  · Patient completed Supine to Sit with independence and long sit  · Patient completed Sit to Supine with independence     ·       Activities of Daily Living:  · Feeding:  independence    · Grooming: independence    · Upper Body Dressing: stand by assistance with gown in long sit  · Lower Body Dressing: moderate assistance as per patient report         Past Medical History:   Diagnosis Date    Anxiety     Chronic pain syndrome     CKD (chronic kidney disease), stage III     Depression     Diabetes mellitus     type 2    Diabetes mellitus, type 2     GERD (gastroesophageal reflux disease)     Hyperemesis 3/23/2021    Hyperlipemia     Hypertension     Hypokalemia 3/23/2021    Myocardial  infarction 2010    minor-caused by stress per pt.    Osteomyelitis     Osteomyelitis of left foot 4/30/2021    PVD (peripheral vascular disease)     Vaginal delivery     x1     Past Surgical History:   Procedure Laterality Date    ABOVE-KNEE AMPUTATION Left 5/18/2021    Procedure: AMPUTATION, ABOVE KNEE;  Surgeon: Teddy Huber MD;  Location: [address];  Service: Vascular;  Laterality: Left;    Angiogram - Right Extremity Right 7/9/15    angiogram-left leg  10/6/15    ANGIOGRAPHY OF LOWER EXTREMITY Left 4/29/2021    Procedure: ANGIOGRAM, LOWER EXTREMITY;  Surgeon: Teddy Huber MD;  Location: [address];  Service: Vascular;  Laterality: Left;    BELOW KNEE AMPUTATION OF LOWER EXTREMITY Right 12/28/2021    Procedure: AMPUTATION, BELOW KNEE;  Surgeon: Kaitlyn Rojas MD;  Location: [facility];  Service: General;  Laterality: Right;    CATHETERIZATION OF BOTH LEFT AND RIGHT HEART N/A 12/18/2019    Procedure: CATHETERIZATION, HEART, BOTH LEFT AND RIGHT;  Surgeon: Que Fernando III, MD;  Location: [facility] CATH LAB;  Service: Cardiology;  Laterality: N/A;    CORONARY ANGIOGRAPHY N/A 12/18/2019    Procedure: ANGIOGRAM, CORONARY ARTERY;  Surgeon: Que Fernando III, MD;  Location: [facility] CATH LAB;  Service: Cardiology;  Laterality: N/A;    CORONARY ANGIOGRAPHY INCLUDING BYPASS GRAFTS WITH CATHETERIZATION OF LEFT HEART N/A 7/28/2020    Procedure: ANGIOGRAM, CORONARY, INCLUDING BYPASS GRAFT, WITH LEFT HEART CATHETERIZATION, 9 am;  Surgeon: Rachel Easley MD;  Location: [facility] CATH LAB;  Service: Cardiology;  Laterality: N/A;    CORONARY ARTERY BYPASS GRAFT (CABG) N/A 1/14/2020    Procedure: CORONARY ARTERY BYPASS GRAFT (CABG) x 1     Off Pump;  Surgeon: Huang Altamirano MD;  Location: [address];  Service: Cardiovascular;  Laterality: N/A;    CREATION OF FEMORAL-TIBIAL ARTERY BYPASS Left 4/29/2021    Procedure: CREATION, BYPASS, ARTERIAL, FEMORAL TO ANTERIOR TIBIAL;   Surgeon: Teddy Huber MD;  [facility];  Service: Vascular;  Laterality: Left;    CREATION OF FEMOROPOPLITEAL ARTERIAL BYPASS USING GRAFT Left 8/18/2020    Procedure: CREATION, BYPASS, ARTERIAL, FEMORAL TO POPLITEAL, USING GRAFT, LEFT LOWER EXTREMITY;  Surgeon: Teddy Huber MD;  Location: [facility];  Service: Vascular;  Laterality: Left;  REQUEST 7:15 A.M. START----COVID NEGATIVE ON 8/17  1ST CASE STARTE PER LEANA ON 8/7/2020 @ 942AM-  RN PREOP 8/12/2020   T/S-----CLEARED BY CARDS-------PENDING INSURANCE    DEBRIDEMENT OF FOOT Left 9/8/2020    Procedure: DEBRIDEMENT, FOOT;  Surgeon: Rosio Mayes DPM;  Location: [facility];  Service: Podiatry;  Laterality: Left;  request neoxx .   RN Pre Op 9-4-2020, Covid negative on 9/5/20. C A    DEBRIDEMENT OF FOOT  3/4/2021    Procedure: DEBRIDEMENT, FOOT;  Surgeon: Teddy Huber MD;  Location: [facility];  Service: Vascular;;    DEBRIDEMENT OF FOOT Left 3/9/2021    Procedure: DEBRIDEMENT, FOOT, bone biopsy;  Surgeon: Rosio Mayes DPM;  Location: [facility] OR;  Service: Podiatry;  Laterality: Left;  Request neoxx---COVID IN AM  REQUESTING NOON START  RN Phone Pre op.On Blood thinners Plavix and Eliquis.  Covid am of surgery. C A    DEBRIDEMENT OF FOOT Left 5/4/2021    Procedure: DEBRIDEMENT, FOOT;  Surgeon: Farooq Morley DPM;  [facility];  Service: Podiatry;  Laterality: Left;    INSERTION OF TUNNELED CENTRAL VENOUS HEMODIALYSIS CATHETER N/A 1/27/2020    Procedure: Insertion, Catheter, Central Venous, Hemodialysis;  Surgeon: ESTEBAN Gomez III, MD;  Location: [facility] CATH LAB;  Service: Peripheral Vascular;  Laterality: N/A;    PERCUTANEOUS TRANSLUMINAL ANGIOPLASTY N/A 3/4/2021    Procedure: PTA (ANGIOPLASTY, PERCUTANEOUS, TRANSLUMINAL);  Surgeon: Teddy Huber MD;  Location: [facility] OR;  Service: Vascular;  Laterality: N/A;    REMOVAL OF ARTERIOVENOUS GRAFT Left 5/27/2021    Procedure: REMOVAL, GRAFT, LEFT LOWER  EXTREMITY, WOUND EXPLORATION;  Surgeon: Teddy Huber MD;  Location: [facility] OR 2ND FLR;  Service: Vascular;  Laterality: Left;    REMOVAL OF NAIL OF DIGIT Left 3/9/2021    Procedure: REMOVAL, NAIL, DIGIT;  Surgeon: Rosio Mayes DPM;  Location: [facility] OR;  Service: Podiatry;  Laterality: Left;    THROMBECTOMY Left 3/4/2021    Procedure: THROMBECTOMY, LEFT LOWER EXTREMITY BYPASS GRAFT, ANGIOGRAM, POSSIBLE INTERVENTION, POSSIBLE LEFT LOWER EXTREMITY BYPASS;  Surgeon: Teddy Huber MD;  Location: [facility] OR;  Service: Vascular;  Laterality: Left;    THROMBECTOMY Left 4/29/2021    Procedure: GRAFT THROMBECTOMY, LEFT LOWER EXTREMITY;  Surgeon: Teddy Huber MD;  Location: [facility] OR [facility] FLR;  Service: Vascular;  Laterality: Left;  14.5 min  1179.85 mGy  341.01 Gycm2  240 ml dye    THROMBECTOMY  10/22/2021    Procedure: THROMBECTOMY;  Surgeon: Saad Arenas MD;  Location: [facility] CATH LAB/EP;  Service: Cardiology;;     Review of patient's allergies indicates:   Allergen Reactions    Ciprofloxacin Itching    Contrast media      Kidney injury    Iodine      Kidney injury       Scheduled Medications:    atorvastatin  80 mg Oral QHS    insulin aspart U-100  5 Units Subcutaneous TIDWM    insulin detemir U-100  10 Units Subcutaneous QHS    meropenem (MERREM) IVPB  2 g Intravenous Q12H    polyethylene glycol  17 g Oral Daily    senna  8.6 mg Oral Daily    sertraline  50 mg Oral Daily       PRN Medications: sodium chloride, acetaminophen, albuterol sulfate, dextrose 50%, dextrose 50%, glucagon (human recombinant), glucose, glucose, hydrALAZINE, HYDROcodone-acetaminophen, insulin aspart U-100, melatonin, morphine, naloxone, ondansetron, sodium chloride 0.9%    Family History     Problem Relation (Age of Onset)    Diabetes Mother, Father, Paternal Grandmother    Heart disease Maternal Grandmother    No Known Problems Maternal Grandfather, Paternal Grandfather        Tobacco Use    Smoking status: Former  Smoker    Smokeless tobacco: Never Used   Substance and Sexual Activity    Alcohol use: No    Drug use: Yes     Types: Marijuana     Comment: occassional    Sexual activity: Yes     Partners: Male     Review of Systems   Constitutional: Positive for activity change.   HENT: Negative.    Eyes: Negative.    Respiratory: Negative.    Cardiovascular: Negative.    Gastrointestinal: Negative.    Endocrine: Negative.    Genitourinary: Negative.    Musculoskeletal: Positive for gait problem.   Skin: Positive for wound.        LBKA wound with DSG intact.    Neurological: Positive for weakness.   Psychiatric/Behavioral: Negative.      Objective:     Vital Signs (Most Recent):  Temp: 97.7 °F (36.5 °C) (02/04/22 0936)  Pulse: 105 (02/04/22 0936)  Resp: 18 (02/04/22 0936)  BP: 123/60 (02/04/22 0936)  SpO2: 95 % (02/04/22 0936)    Vital Signs (24h Range):  Temp:  [97.6 °F (36.4 °C)-98.4 °F (36.9 °C)] 97.7 °F (36.5 °C)  Pulse:  [] 105  Resp:  [16-19] 18  SpO2:  [94 %-98 %] 95 %  BP: (123-174)/(60-82) 123/60     Body mass index is 24.8 kg/m².    Physical Exam  Vitals and nursing note reviewed.   Constitutional:       Appearance: Normal appearance.   HENT:      Head: Atraumatic.   Pulmonary:      Effort: Pulmonary effort is normal.   Abdominal:      Palpations: Abdomen is soft.   Musculoskeletal:      Cervical back: Normal range of motion.   Skin:     General: Skin is warm and dry.   Neurological:      General: No focal deficit present.      Mental Status: She is alert and oriented to person, place, and time.      GCS: GCS eye subscore is 4. GCS verbal subscore is 5. GCS motor subscore is 6.      Motor: Weakness present.      Coordination: Coordination abnormal. Heel to Bonilla Test abnormal.      Gait: Gait abnormal.   Psychiatric:         Mood and Affect: Mood normal.         Behavior: Behavior normal.       NEUROLOGICAL EXAMINATION:     MENTAL STATUS   Oriented to person, place, and time.       Diagnostic Results: Labs  reviewed.     Assessment/Plan:     * Severe sepsis    -Wound infection to Right BKA.  -Per general surgery, no interventions recommended.  -Wound care.  -Meropenem per ID till 02/07.      Altered mental state  -Resolved. Monitor.     Impaired functional mobility and endurance  See hospital course for functional status.    Recommendations  -  Encourage mobility, OOB in chair, and early ambulation as appropriate  -  PT/OT evaluate and treat  -  Pain management  -  DVT prophylaxis if appropriate   -  Monitor for and prevent skin breakdown and pressure ulcers  · Early mobility, repositioning/weight shifting every 20-30 minutes when sitting, turn patient every 2 hours, proper mattress/overlay and chair cushioning, pressure relief/heel protector boots      Acute blood loss anemia  - H and H low with Hemoglobin 6.5-6.8. HM to transfuse 1 unit.  -Monitor CBC after transfusion is complete and ensure H and H is stable for 24 hours before rehab transfer.  -Recommend check for Occult blood stool.  - Recommend iron studies blood work.     PM&R Recommendation:     At this time, the PM&R team has reviewed this patient's ongoing medical case including inpatient diagnosis, medical history, clinical examination, labs, vitals, current social and functional history. We will continue to follow pt for returning to [facility] once medically stable. Current barriers include H and H stability, pain control without requiring IV Morphine for 24 hours.     Thank you for your consult.     Maryann Perez NP  Department of Physical Medicine & Rehab  [facility] - Med Surg

## 2022-02-04 NOTE — ASSESSMENT & PLAN NOTE
MRI Sacrum W Out contrast (in setting of ROSLYN) without signs of osteomyelitis.    - Wound care consulted, appreciate recs  - Cleanse with cleaning cloths, apply Triad ointment BID/prn cleansing

## 2022-02-04 NOTE — ASSESSMENT & PLAN NOTE
-Wound infection to Right BKA.  -Per general surgery, no interventions recommended.  -Wound care.  -Meropenem per ID till 02/07.

## 2022-02-04 NOTE — ASSESSMENT & PLAN NOTE
Patient had recent BKA at [facility] with recent debridement last week  - patient has active drainage from wound with continued pain and complications    Plan  - morphine 15 mg PO q4 PRN for severe pain  - Norco 5mg PRN q6 for moderate pain  - Wound Care consulted, appreciate recs  - Gen Surg states no surgical intervention recommended at this time.   - ID recommend to continue with meropenem through 2/7

## 2022-02-05 LAB
ALBUMIN SERPL BCP-MCNC: 1.7 G/DL (ref 3.5–5.2)
ALP SERPL-CCNC: 83 U/L (ref 55–135)
ALT SERPL W/O P-5'-P-CCNC: 11 U/L (ref 10–44)
ANION GAP SERPL CALC-SCNC: 7 MMOL/L (ref 8–16)
AST SERPL-CCNC: 18 U/L (ref 10–40)
BACTERIA BLD CULT: NORMAL
BACTERIA BLD CULT: NORMAL
BASOPHILS # BLD AUTO: 0.05 K/UL (ref 0–0.2)
BASOPHILS NFR BLD: 0.7 % (ref 0–1.9)
BILIRUB SERPL-MCNC: 0.2 MG/DL (ref 0.1–1)
BUN SERPL-MCNC: 47 MG/DL (ref 6–20)
CALCIUM SERPL-MCNC: 9.4 MG/DL (ref 8.7–10.5)
CHLORIDE SERPL-SCNC: 102 MMOL/L (ref 95–110)
CO2 SERPL-SCNC: 26 MMOL/L (ref 23–29)
CREAT SERPL-MCNC: 1.5 MG/DL (ref 0.5–1.4)
DIFFERENTIAL METHOD: ABNORMAL
EOSINOPHIL # BLD AUTO: 0.4 K/UL (ref 0–0.5)
EOSINOPHIL NFR BLD: 4.8 % (ref 0–8)
ERYTHROCYTE [DISTWIDTH] IN BLOOD BY AUTOMATED COUNT: 16 % (ref 11.5–14.5)
EST. GFR  (AFRICAN AMERICAN): 44.9 ML/MIN/1.73 M^2
EST. GFR  (NON AFRICAN AMERICAN): 38.9 ML/MIN/1.73 M^2
GLUCOSE SERPL-MCNC: 121 MG/DL (ref 70–110)
HCT VFR BLD AUTO: 25.9 % (ref 37–48.5)
HGB BLD-MCNC: 7.9 G/DL (ref 12–16)
IMM GRANULOCYTES # BLD AUTO: 0.03 K/UL (ref 0–0.04)
IMM GRANULOCYTES NFR BLD AUTO: 0.4 % (ref 0–0.5)
LYMPHOCYTES # BLD AUTO: 1.5 K/UL (ref 1–4.8)
LYMPHOCYTES NFR BLD: 20 % (ref 18–48)
MAGNESIUM SERPL-MCNC: 2.3 MG/DL (ref 1.6–2.6)
MCH RBC QN AUTO: 29.4 PG (ref 27–31)
MCHC RBC AUTO-ENTMCNC: 30.5 G/DL (ref 32–36)
MCV RBC AUTO: 96 FL (ref 82–98)
MONOCYTES # BLD AUTO: 0.8 K/UL (ref 0.3–1)
MONOCYTES NFR BLD: 10.6 % (ref 4–15)
NEUTROPHILS # BLD AUTO: 4.8 K/UL (ref 1.8–7.7)
NEUTROPHILS NFR BLD: 63.5 % (ref 38–73)
NRBC BLD-RTO: 0 /100 WBC
PHOSPHATE SERPL-MCNC: 4.8 MG/DL (ref 2.7–4.5)
PLATELET # BLD AUTO: 346 K/UL (ref 150–450)
PMV BLD AUTO: 11.4 FL (ref 9.2–12.9)
POCT GLUCOSE: 137 MG/DL (ref 70–110)
POCT GLUCOSE: 163 MG/DL (ref 70–110)
POCT GLUCOSE: 180 MG/DL (ref 70–110)
POCT GLUCOSE: 91 MG/DL (ref 70–110)
POTASSIUM SERPL-SCNC: 4.3 MMOL/L (ref 3.5–5.1)
PROT SERPL-MCNC: 6.8 G/DL (ref 6–8.4)
RBC # BLD AUTO: 2.69 M/UL (ref 4–5.4)
SODIUM SERPL-SCNC: 135 MMOL/L (ref 136–145)
WBC # BLD AUTO: 7.55 K/UL (ref 3.9–12.7)

## 2022-02-05 PROCEDURE — 11000001 HC ACUTE MED/SURG PRIVATE ROOM: Mod: HCNC

## 2022-02-05 PROCEDURE — 99232 SBSQ HOSP IP/OBS MODERATE 35: CPT | Mod: HCNC,,, | Performed by: HOSPITALIST

## 2022-02-05 PROCEDURE — 85025 COMPLETE CBC W/AUTO DIFF WBC: CPT | Mod: HCNC | Performed by: STUDENT IN AN ORGANIZED HEALTH CARE EDUCATION/TRAINING PROGRAM

## 2022-02-05 PROCEDURE — 36415 COLL VENOUS BLD VENIPUNCTURE: CPT | Mod: HCNC | Performed by: STUDENT IN AN ORGANIZED HEALTH CARE EDUCATION/TRAINING PROGRAM

## 2022-02-05 PROCEDURE — 25000003 PHARM REV CODE 250: Mod: HCNC

## 2022-02-05 PROCEDURE — 83735 ASSAY OF MAGNESIUM: CPT | Mod: HCNC | Performed by: STUDENT IN AN ORGANIZED HEALTH CARE EDUCATION/TRAINING PROGRAM

## 2022-02-05 PROCEDURE — 36430 TRANSFUSION BLD/BLD COMPNT: CPT | Mod: HCNC

## 2022-02-05 PROCEDURE — 99232 PR SUBSEQUENT HOSPITAL CARE,LEVL II: ICD-10-PCS | Mod: HCNC,,, | Performed by: HOSPITALIST

## 2022-02-05 PROCEDURE — 84100 ASSAY OF PHOSPHORUS: CPT | Mod: HCNC | Performed by: STUDENT IN AN ORGANIZED HEALTH CARE EDUCATION/TRAINING PROGRAM

## 2022-02-05 PROCEDURE — 25000003 PHARM REV CODE 250: Mod: HCNC | Performed by: STUDENT IN AN ORGANIZED HEALTH CARE EDUCATION/TRAINING PROGRAM

## 2022-02-05 PROCEDURE — 63600175 PHARM REV CODE 636 W HCPCS: Mod: HCNC | Performed by: STUDENT IN AN ORGANIZED HEALTH CARE EDUCATION/TRAINING PROGRAM

## 2022-02-05 PROCEDURE — 80053 COMPREHEN METABOLIC PANEL: CPT | Mod: HCNC | Performed by: STUDENT IN AN ORGANIZED HEALTH CARE EDUCATION/TRAINING PROGRAM

## 2022-02-05 PROCEDURE — 27000207 HC ISOLATION: Mod: HCNC

## 2022-02-05 RX ORDER — LISINOPRIL 20 MG/1
20 TABLET ORAL DAILY
Status: DISCONTINUED | OUTPATIENT
Start: 2022-02-05 | End: 2022-02-05

## 2022-02-05 RX ORDER — CARVEDILOL 3.12 MG/1
3.12 TABLET ORAL 2 TIMES DAILY
Status: DISCONTINUED | OUTPATIENT
Start: 2022-02-05 | End: 2022-02-10 | Stop reason: HOSPADM

## 2022-02-05 RX ORDER — MORPHINE SULFATE 15 MG/1
15 TABLET ORAL EVERY 4 HOURS PRN
Qty: 40 TABLET | Refills: 0
Start: 2022-02-05 | End: 2022-02-08 | Stop reason: HOSPADM

## 2022-02-05 RX ORDER — MIRTAZAPINE 7.5 MG/1
7.5 TABLET, FILM COATED ORAL NIGHTLY
Status: DISCONTINUED | OUTPATIENT
Start: 2022-02-05 | End: 2022-02-06

## 2022-02-05 RX ORDER — PREGABALIN 50 MG/1
50 CAPSULE ORAL 2 TIMES DAILY
Status: DISCONTINUED | OUTPATIENT
Start: 2022-02-06 | End: 2022-02-06

## 2022-02-05 RX ADMIN — HYDROCODONE BITARTRATE AND ACETAMINOPHEN 1 TABLET: 5; 325 TABLET ORAL at 01:02

## 2022-02-05 RX ADMIN — MORPHINE SULFATE 15 MG: 15 TABLET ORAL at 06:02

## 2022-02-05 RX ADMIN — CARVEDILOL 3.12 MG: 3.12 TABLET, FILM COATED ORAL at 11:02

## 2022-02-05 RX ADMIN — RIVAROXABAN 2.5 MG: 2.5 TABLET, FILM COATED ORAL at 04:02

## 2022-02-05 RX ADMIN — HYDROCODONE BITARTRATE AND ACETAMINOPHEN 1 TABLET: 5; 325 TABLET ORAL at 09:02

## 2022-02-05 RX ADMIN — SENNOSIDES 8.6 MG: 8.6 TABLET ORAL at 09:02

## 2022-02-05 RX ADMIN — CLOPIDOGREL 75 MG: 75 TABLET, FILM COATED ORAL at 09:02

## 2022-02-05 RX ADMIN — RIVAROXABAN 2.5 MG: 2.5 TABLET, FILM COATED ORAL at 10:02

## 2022-02-05 RX ADMIN — MEROPENEM 2 G: 1 INJECTION INTRAVENOUS at 05:02

## 2022-02-05 RX ADMIN — MEROPENEM 2 G: 1 INJECTION INTRAVENOUS at 06:02

## 2022-02-05 RX ADMIN — CARVEDILOL 3.12 MG: 3.12 TABLET, FILM COATED ORAL at 09:02

## 2022-02-05 RX ADMIN — ATORVASTATIN CALCIUM 80 MG: 40 TABLET, FILM COATED ORAL at 09:02

## 2022-02-05 RX ADMIN — SERTRALINE HYDROCHLORIDE 50 MG: 50 TABLET ORAL at 09:02

## 2022-02-05 RX ADMIN — MORPHINE SULFATE 15 MG: 15 TABLET ORAL at 04:02

## 2022-02-05 RX ADMIN — MIRTAZAPINE 7.5 MG: 7.5 TABLET, FILM COATED ORAL at 09:02

## 2022-02-05 RX ADMIN — INSULIN DETEMIR 10 UNITS: 100 INJECTION, SOLUTION SUBCUTANEOUS at 09:02

## 2022-02-05 NOTE — ASSESSMENT & PLAN NOTE
Patient has myoclonal jerks on exam  - likely in setting of pregabalin build with ROSLYN  - improving with holding meds    Plan  - restart reduced lyrica 50 mg BID;   - restart mirtazipine  qhs  - follow up with PCP  - neuro checks  - delirium precautions

## 2022-02-05 NOTE — ASSESSMENT & PLAN NOTE
Chronic disease - MCV & MCH normal; denies acute blood loss, no indication for transfusion at this time.   Elevated TIBC, ferritin  Decreased transferrin    - Plan to transfuse if <7 with active bleeding

## 2022-02-05 NOTE — ASSESSMENT & PLAN NOTE
Ms. Connelly is a 55 yo female s/p r BKA and hx of Pseudomonas infection at the site of surgery presenting with AMS, leukocytosis, and elevated procalc  - likely sepsis due to wound infection/OM  - ESR and CRP slightly elevated  - Procalc of 0.38  -MRI Sacrum and R leg without evidence of OM  - Gen Surg following given repeated wound infection s/p BKA, no surgical intervention, signing off  - blood cx x2, wound cx, resp cx NGTD  - urine cx NGTD to r/o other source     Plan  - wound care consulted  - Inflammatory markers elevated, confirming likely source is wound  - CBC daily to trend WCC  - meropenem 2g q12 until 2/7 per ID recs

## 2022-02-05 NOTE — PROGRESS NOTES
"[facility] Medicine  Progress Note    Patient Name: Suyapa Connelly  MRN: [id number]  Patient Class: IP- Inpatient   Admission Date: 1/31/2022  Length of Stay: 4 days  Attending Physician: Flavia Delgado MD  Primary Care Provider: Magen Christensen MD        Subjective:     Principal Problem:Severe sepsis        HPI:  Suyapa Connelly is a 55 y/o female with PMHx CKD3, DMII, GERD, HLD, HTN, osteomyelitis, PVD, depression, and anxiety presents to ED with  for concerns of increased confusion from her rehab facility.  at bedside is concern for progressively worsening appearance of RLE wound, along with continued drainage, and worsening mental status of his wife which has progressed through this week. Patient emotionally labile and responds yes or no to most questions. She reports increased pain in her R leg with nausea over the past week. She also reports constipation without abdominal pain. She denies dysuria. Patient denies fevers, chills, vomiting, chest pain, cough, and shortness of breath.     Patient underwent right BKA by Dr. Rojas on 12/28 then discharged to rehab facility on 1/10. Since then she has recurrent hospital visits for the wound, requiring revisions and debridements; the last resulted in a pseudomonas positive wound culture resistant to cefepime 1/20. [facility] rehab RN stated that patient was "very confused this morning and more lethargic than normal." Per rehab, pt is normally AOx3 and able to follow commands. Rehab states they were concerned for sepsis due to elevated WBC and ROSLYN. Per , her last known normal was 1 weeks ago.    She was admitted to hospital medicine for severe sepsis.       Overview/Hospital Course:  Patient was admitted to hospital 1/31 for AMS and concern about worsening appearance of RLE wound. Patient was started on antibiotics (cefepime, vancomycin & azithromycin), blood, wound, resp & urine samples were collected for culture due to " concern for sepsis and ID and Gen Surg (s/p BKA on 12/28) were consulted. Pregabalin was withheld due to patient's myoclonus, likely 2/2 to ROSLYN leading to increased serum concentrations. Patient's mental status slowly improving throughout hospitalization, likely AMS due to both pregabalin intox and sepsis. ROSLYN resolved with IVF and abx. Increasing limb pain, pregabalin restarted at low dose. Azithromycin and vanc discontinued and continuing with meropenem. ID with final recs to continue through the 7th with meropenem, patient medically ready to return to rehab once Hgb stabilizes and after transfusion. Holding pregabalin and mirtazapine due to return of myoclonus. Improved cognition today.      Interval History: NAEON. Afebrile. Patient hypertensive with highs of 158/96. Patient seen in bed eating breakfast. She was in good spirits and oriented today. Reports good appetite and ready for rehab. Denies v/n/d/c, back/neck pain. Leg pain is well controlled. Transfused 1upRBC overnight.    Review of Systems   Constitutional: Negative for chills and fever.   HENT: Negative for congestion and rhinorrhea.    Eyes: Negative for pain and visual disturbance.   Respiratory: Negative for chest tightness and shortness of breath.    Cardiovascular: Negative for chest pain, palpitations and leg swelling.   Gastrointestinal: Negative for abdominal distention, abdominal pain, constipation, diarrhea, nausea and vomiting.   Genitourinary: Negative for difficulty urinating and dysuria.   Musculoskeletal: Negative for back pain and neck pain.   Skin: Negative for rash and wound.   Neurological: Negative for dizziness and headaches.   Psychiatric/Behavioral: Negative for confusion and sleep disturbance.     Objective:     Vital Signs (Most Recent):  Temp: 97.2 °F (36.2 °C) (02/05/22 0811)  Pulse: 94 (02/05/22 0811)  Resp: 18 (02/05/22 0647)  BP: (!) 151/78 (02/05/22 0811)  SpO2: 100 % (02/05/22 0811) Vital Signs (24h Range):  Temp:  [97.2  °F (36.2 °C)-98.4 °F (36.9 °C)] 97.2 °F (36.2 °C)  Pulse:  [] 94  Resp:  [14-20] 18  SpO2:  [92 %-100 %] 100 %  BP: (110-158)/(60-96) 151/78     Weight: 67.6 kg (149 lb 0.5 oz)  Body mass index is 24.8 kg/m².    Intake/Output Summary (Last 24 hours) at 2/5/2022 0930  Last data filed at 2/5/2022 0639  Gross per 24 hour   Intake 999.58 ml   Output --   Net 999.58 ml      Physical Exam  Vitals and nursing note reviewed.   Constitutional:       General: She is not in acute distress.     Appearance: Normal appearance. She is not ill-appearing or diaphoretic.   HENT:      Head: Normocephalic and atraumatic.      Right Ear: External ear normal.      Left Ear: External ear normal.      Nose: Nose normal. No congestion or rhinorrhea.      Mouth/Throat:      Mouth: Mucous membranes are moist.      Pharynx: Oropharynx is clear.   Eyes:      General: No scleral icterus.     Extraocular Movements: Extraocular movements intact.      Pupils: Pupils are equal, round, and reactive to light.   Cardiovascular:      Rate and Rhythm: Normal rate and regular rhythm.      Pulses: Normal pulses.      Heart sounds: Normal heart sounds.   Pulmonary:      Effort: Pulmonary effort is normal.      Breath sounds: Normal breath sounds. No wheezing, rhonchi or rales.   Abdominal:      General: Bowel sounds are normal. There is no distension.      Palpations: Abdomen is soft.      Tenderness: There is no abdominal tenderness. There is no right CVA tenderness or left CVA tenderness.   Musculoskeletal:         General: Normal range of motion.      Cervical back: Normal range of motion and neck supple. No tenderness.      Comments: R. BKA + L. AKA, R.BKA bandaged and can tolerate touch without pain.    Lymphadenopathy:      Cervical: No cervical adenopathy.   Skin:     General: Skin is warm and dry.   Neurological:      Mental Status: She is alert and oriented to person, place, and time.   Psychiatric:         Mood and Affect: Mood normal.          Behavior: Behavior normal.         Thought Content: Thought content normal.         Judgment: Judgment normal.         Significant Labs: All pertinent labs within the past 24 hours have been reviewed.    Significant Imaging: I have reviewed all pertinent imaging results/findings within the past 24 hours.      Assessment/Plan:      * Severe sepsis  Ms. Connelly is a 57 yo female s/p r BKA and hx of Pseudomonas infection at the site of surgery presenting with AMS, leukocytosis, and elevated procalc  - likely sepsis due to wound infection/OM  - ESR and CRP slightly elevated  - Procalc of 0.38  -MRI Sacrum and R leg without evidence of OM  - Gen Surg following given repeated wound infection s/p BKA, no surgical intervention, signing off  - blood cx x2, wound cx, resp cx NGTD  - urine cx NGTD to r/o other source     Plan  - wound care consulted  - Inflammatory markers elevated, confirming likely source is wound  - CBC daily to trend WCC  - meropenem 2g q12 until 2/7 per ID recs        Myoclonus  Patient has myoclonal jerks on exam  - likely in setting of pregabalin build with ROSLYN  - improving with holding meds    Plan  - restart reduced lyrica 50 mg BID;   - restart mirtazipine  qhs  - follow up with PCP  - neuro checks  - delirium precautions      ROSLYN (acute kidney injury)  HPatient with acute kidney injury likely d/t IVVD/Dehydration Which is currently worsening. Labs reviewed- Renal function/electrolytes with Estimated Creatinine Clearance: 38.1 mL/min (A) (based on SCr of 1.5 mg/dL (H)). according to latest data. Monitor urine output and serial BMP and adjust therapy as needed. Avoid nephrotoxins and renally dose meds for GFR listed above.   - improved with IVF, likely pre renal    Plan  - strict I/Os  - renally dose meds  - avoid nephrotoxic agents  - RESOLVED      Altered mental state  Likely in setting of infection  - CTH negative    Plan  - neuro checks  - delirium precautions      Decubitus ulcer of sacral  region, unstageable  MRI Sacrum W Out contrast (in setting of ROSLYN) without signs of osteomyelitis.    - Wound care consulted, appreciate recs  - Cleanse with cleaning cloths, apply Triad ointment BID/prn cleansing    S/P BKA (below knee amputation) unilateral, right  Patient had recent BKA at [facility] with recent debridement last week  - patient has active drainage from wound with continued pain and complications    Plan  - morphine 15 mg PO q4 PRN for severe pain  - Norco 5mg PRN q6 for moderate pain  - Wound Care consulted, appreciate recs  - Gen Surg states no surgical intervention recommended at this time.   - ID recommend to continue with meropenem through 2/7    Hypoalbuminemia  - Boost glucose control TIDWM   - Heme/onc outpatient referral    Impaired functional mobility and endurance  PT/OT consulted, recommend returning to Rehab, awaiting acceptance    Chronic anemia    Chronic disease - MCV & MCH normal; denies acute blood loss, no indication for transfusion at this time.   Elevated TIBC, ferritin  Decreased transferrin    - Plan to transfuse if <7 with active bleeding    Type 2 diabetes mellitus with diabetic nephropathy, with long-term current use of insulin  Lab Results   Component Value Date    HGBA1C 9.6 (H) 12/03/2021     Plan  - Regular diet due to patient request  - MDSSI PRN with POCT glu TIDWM + QHS  - detemir 10 QD  - Aspart 5 TIDWM  - increase basal and add bolus regimen per requirements      Acute blood loss anemia  Patient has Hgb of 6.6 on admission    Plan  - transfuse 1U pRBC  - Transfuse another 1u pRBC on 2/4.   - Anemia workup with iron, TIBC, b12 panel and FOBT. No apparent sources of bleeding.  - discontinue apixaban, and start xarelto 2.5mg BID + plavix 75 mg  - CBC daily, transfuse Hgb < 7      Mixed hyperlipidemia  Patient on atorvastatin 80mg at home    Plan  - continue home med     CAROLIN (generalized anxiety disorder)  Patient on melatonin, remeron, zoloft     Plan  - restart  zoloft 50mg  - PRN melatonin  - Hold remeron 2/4 due to myoclonus, will restart this first before lyrica    Essential hypertension  History of hypertension, without home meds currently.    -restart carvedilol 3.125 bid   - hold home valsartan since ROSLYN  -Q4 vitals to monitor and titrate as needed    Moderate malnutrition  Boost TIDWM    VTE Risk Mitigation (From admission, onward)           Ordered     IP VTE HIGH RISK PATIENT  Once         01/31/22 1930     Place sequential compression device  Until discontinued         01/31/22 1930                    Discharge Planning   DEVAN: 2/7/2022     Code Status: Prior   Is the patient medically ready for discharge?: Yes    Reason for patient still in hospital (select all that apply): Patient trending condition, Treatment and Pending disposition  Discharge Plan A: Rehab   Discharge Delays: None known at this time      Dominik Manley MD  Department of Hospital Medicine   [facility] - Med Surg                      02/05/2022                             STAFF PHYSICIAN NOTE                                   Attending Attestation for Rounds with Resident  I have reviewed and concur with the resident's history, physical, assessment, and plan.  I have personally interviewed and examined the patient at bedside and agree with the resident's findings.      57 yo with hx of bilat BKA, T2DM, depression, neuropathic pain presenting with recurrent sepsis likely due to wound infection (stump vs sacral) and PNA.  Long discussion w pt and her  about her care and welfare. [facility]                              Flavia Delgado MD  Senior Hospitalist  [id number], [phone]

## 2022-02-05 NOTE — ASSESSMENT & PLAN NOTE
Patient had recent BKA at [facility] with recent debridement last week  - patient has active drainage from wound with continued pain and complications    Plan  - morphine 15 mg PO q4 PRN for severe pain  - Norco 5mg PRN q6 for moderate pain  - Wound Care consulted, appreciate recs  - Gen Surg states no surgical intervention recommended at this time.   - ID recommend to continue with meropenem through 2/7

## 2022-02-05 NOTE — ASSESSMENT & PLAN NOTE
History of hypertension, without home meds currently.    -restart carvedilol 3.125 bid   - hold home valsartan since RSOLYN  -Q4 vitals to monitor and titrate as needed

## 2022-02-05 NOTE — SUBJECTIVE & OBJECTIVE
Interval History: NAEON. Afebrile. Patient hypertensive with highs of 158/96. Patient seen in bed eating breakfast. She was in good spirits and oriented today. Reports good appetite and ready for rehab. Denies v/n/d/c, back/neck pain. Leg pain is well controlled. Transfused 1upRBC overnight.    Review of Systems   Constitutional: Negative for chills and fever.   HENT: Negative for congestion and rhinorrhea.    Eyes: Negative for pain and visual disturbance.   Respiratory: Negative for chest tightness and shortness of breath.    Cardiovascular: Negative for chest pain, palpitations and leg swelling.   Gastrointestinal: Negative for abdominal distention, abdominal pain, constipation, diarrhea, nausea and vomiting.   Genitourinary: Negative for difficulty urinating and dysuria.   Musculoskeletal: Negative for back pain and neck pain.   Skin: Negative for rash and wound.   Neurological: Negative for dizziness and headaches.   Psychiatric/Behavioral: Negative for confusion and sleep disturbance.     Objective:     Vital Signs (Most Recent):  Temp: 97.2 °F (36.2 °C) (02/05/22 0811)  Pulse: 94 (02/05/22 0811)  Resp: 18 (02/05/22 0647)  BP: (!) 151/78 (02/05/22 0811)  SpO2: 100 % (02/05/22 0811) Vital Signs (24h Range):  Temp:  [97.2 °F (36.2 °C)-98.4 °F (36.9 °C)] 97.2 °F (36.2 °C)  Pulse:  [] 94  Resp:  [14-20] 18  SpO2:  [92 %-100 %] 100 %  BP: (110-158)/(60-96) 151/78     Weight: 67.6 kg (149 lb 0.5 oz)  Body mass index is 24.8 kg/m².    Intake/Output Summary (Last 24 hours) at 2/5/2022 0930  Last data filed at 2/5/2022 0639  Gross per 24 hour   Intake 999.58 ml   Output --   Net 999.58 ml      Physical Exam  Vitals and nursing note reviewed.   Constitutional:       General: She is not in acute distress.     Appearance: Normal appearance. She is not ill-appearing or diaphoretic.   HENT:      Head: Normocephalic and atraumatic.      Right Ear: External ear normal.      Left Ear: External ear normal.      Nose: Nose  normal. No congestion or rhinorrhea.      Mouth/Throat:      Mouth: Mucous membranes are moist.      Pharynx: Oropharynx is clear.   Eyes:      General: No scleral icterus.     Extraocular Movements: Extraocular movements intact.      Pupils: Pupils are equal, round, and reactive to light.   Cardiovascular:      Rate and Rhythm: Normal rate and regular rhythm.      Pulses: Normal pulses.      Heart sounds: Normal heart sounds.   Pulmonary:      Effort: Pulmonary effort is normal.      Breath sounds: Normal breath sounds. No wheezing, rhonchi or rales.   Abdominal:      General: Bowel sounds are normal. There is no distension.      Palpations: Abdomen is soft.      Tenderness: There is no abdominal tenderness. There is no right CVA tenderness or left CVA tenderness.   Musculoskeletal:         General: Normal range of motion.      Cervical back: Normal range of motion and neck supple. No tenderness.      Comments: R. BKA + L. AKA, R.BKA bandaged and can tolerate touch without pain.    Lymphadenopathy:      Cervical: No cervical adenopathy.   Skin:     General: Skin is warm and dry.   Neurological:      Mental Status: She is alert and oriented to person, place, and time.   Psychiatric:         Mood and Affect: Mood normal.         Behavior: Behavior normal.         Thought Content: Thought content normal.         Judgment: Judgment normal.         Significant Labs: All pertinent labs within the past 24 hours have been reviewed.    Significant Imaging: I have reviewed all pertinent imaging results/findings within the past 24 hours.

## 2022-02-05 NOTE — PLAN OF CARE
Problem: Adult Inpatient Plan of Care  Goal: Plan of Care Review  Outcome: Ongoing, Progressing  Goal: Patient-Specific Goal (Individualized)  Outcome: Ongoing, Progressing  Goal: Absence of Hospital-Acquired Illness or Injury  Outcome: Ongoing, Progressing  Goal: Optimal Comfort and Wellbeing  Outcome: Ongoing, Progressing  Goal: Readiness for Transition of Care  Outcome: Ongoing, Progressing   ALert and orient times 4. Complain of level 10 pain to Lt stump. MS intermittent relief 15 MG was given and 1.5 hours later lortab was give within 1 hours and receiving second pain med Pt went to sleep. Pt stated visitor at bedside is her . Stayed with patient this entire shift. She did recieve 1 unit of blood on this shift and tolerated without any problem . No fall VSS

## 2022-02-06 LAB
ALBUMIN SERPL BCP-MCNC: 1.8 G/DL (ref 3.5–5.2)
ALP SERPL-CCNC: 92 U/L (ref 55–135)
ALT SERPL W/O P-5'-P-CCNC: 12 U/L (ref 10–44)
ANION GAP SERPL CALC-SCNC: 8 MMOL/L (ref 8–16)
AST SERPL-CCNC: 18 U/L (ref 10–40)
BASOPHILS # BLD AUTO: 0.09 K/UL (ref 0–0.2)
BASOPHILS NFR BLD: 0.9 % (ref 0–1.9)
BILIRUB SERPL-MCNC: 0.2 MG/DL (ref 0.1–1)
BUN SERPL-MCNC: 60 MG/DL (ref 6–20)
CALCIUM SERPL-MCNC: 10 MG/DL (ref 8.7–10.5)
CHLORIDE SERPL-SCNC: 104 MMOL/L (ref 95–110)
CO2 SERPL-SCNC: 25 MMOL/L (ref 23–29)
CREAT SERPL-MCNC: 1.7 MG/DL (ref 0.5–1.4)
CREAT UR-MCNC: 83 MG/DL (ref 15–325)
CREAT UR-MCNC: 83 MG/DL (ref 15–325)
DIFFERENTIAL METHOD: ABNORMAL
EOSINOPHIL # BLD AUTO: 0.5 K/UL (ref 0–0.5)
EOSINOPHIL NFR BLD: 5 % (ref 0–8)
ERYTHROCYTE [DISTWIDTH] IN BLOOD BY AUTOMATED COUNT: 15.9 % (ref 11.5–14.5)
EST. GFR  (AFRICAN AMERICAN): 38.6 ML/MIN/1.73 M^2
EST. GFR  (NON AFRICAN AMERICAN): 33.5 ML/MIN/1.73 M^2
GLUCOSE SERPL-MCNC: 85 MG/DL (ref 70–110)
HCT VFR BLD AUTO: 26.1 % (ref 37–48.5)
HGB BLD-MCNC: 8.2 G/DL (ref 12–16)
IMM GRANULOCYTES # BLD AUTO: 0.05 K/UL (ref 0–0.04)
IMM GRANULOCYTES NFR BLD AUTO: 0.5 % (ref 0–0.5)
LYMPHOCYTES # BLD AUTO: 1.9 K/UL (ref 1–4.8)
LYMPHOCYTES NFR BLD: 19.7 % (ref 18–48)
MAGNESIUM SERPL-MCNC: 2.5 MG/DL (ref 1.6–2.6)
MCH RBC QN AUTO: 30.1 PG (ref 27–31)
MCHC RBC AUTO-ENTMCNC: 31.4 G/DL (ref 32–36)
MCV RBC AUTO: 96 FL (ref 82–98)
MONOCYTES # BLD AUTO: 1.2 K/UL (ref 0.3–1)
MONOCYTES NFR BLD: 12.2 % (ref 4–15)
NEUTROPHILS # BLD AUTO: 6.1 K/UL (ref 1.8–7.7)
NEUTROPHILS NFR BLD: 61.7 % (ref 38–73)
NRBC BLD-RTO: 0 /100 WBC
OSMOLALITY UR: 296 MOSM/KG (ref 50–1200)
PHOSPHATE SERPL-MCNC: 6.7 MG/DL (ref 2.7–4.5)
PLATELET # BLD AUTO: 393 K/UL (ref 150–450)
PMV BLD AUTO: 11.7 FL (ref 9.2–12.9)
POCT GLUCOSE: 105 MG/DL (ref 70–110)
POCT GLUCOSE: 147 MG/DL (ref 70–110)
POCT GLUCOSE: 158 MG/DL (ref 70–110)
POCT GLUCOSE: 250 MG/DL (ref 70–110)
POTASSIUM SERPL-SCNC: 4.8 MMOL/L (ref 3.5–5.1)
PROT SERPL-MCNC: 7.2 G/DL (ref 6–8.4)
PROT UR-MCNC: 464 MG/DL (ref 0–15)
PROT/CREAT UR: 5.59 MG/G{CREAT} (ref 0–0.2)
RBC # BLD AUTO: 2.72 M/UL (ref 4–5.4)
SODIUM SERPL-SCNC: 137 MMOL/L (ref 136–145)
SODIUM UR-SCNC: 43 MMOL/L (ref 20–250)
UUN UR-MCNC: 344 MG/DL (ref 140–1050)
WBC # BLD AUTO: 9.87 K/UL (ref 3.9–12.7)

## 2022-02-06 PROCEDURE — 63600175 PHARM REV CODE 636 W HCPCS: Mod: HCNC | Performed by: STUDENT IN AN ORGANIZED HEALTH CARE EDUCATION/TRAINING PROGRAM

## 2022-02-06 PROCEDURE — 99233 PR SUBSEQUENT HOSPITAL CARE,LEVL III: ICD-10-PCS | Mod: HCNC,,, | Performed by: HOSPITALIST

## 2022-02-06 PROCEDURE — 84540 ASSAY OF URINE/UREA-N: CPT | Mod: HCNC

## 2022-02-06 PROCEDURE — 84100 ASSAY OF PHOSPHORUS: CPT | Mod: HCNC | Performed by: STUDENT IN AN ORGANIZED HEALTH CARE EDUCATION/TRAINING PROGRAM

## 2022-02-06 PROCEDURE — 11000001 HC ACUTE MED/SURG PRIVATE ROOM: Mod: HCNC

## 2022-02-06 PROCEDURE — 63600175 PHARM REV CODE 636 W HCPCS: Mod: HCNC

## 2022-02-06 PROCEDURE — 84156 ASSAY OF PROTEIN URINE: CPT | Mod: HCNC

## 2022-02-06 PROCEDURE — 84300 ASSAY OF URINE SODIUM: CPT | Mod: HCNC

## 2022-02-06 PROCEDURE — 83735 ASSAY OF MAGNESIUM: CPT | Mod: HCNC | Performed by: STUDENT IN AN ORGANIZED HEALTH CARE EDUCATION/TRAINING PROGRAM

## 2022-02-06 PROCEDURE — 99233 SBSQ HOSP IP/OBS HIGH 50: CPT | Mod: HCNC,,, | Performed by: HOSPITALIST

## 2022-02-06 PROCEDURE — 27000207 HC ISOLATION: Mod: HCNC

## 2022-02-06 PROCEDURE — 36415 COLL VENOUS BLD VENIPUNCTURE: CPT | Mod: HCNC | Performed by: STUDENT IN AN ORGANIZED HEALTH CARE EDUCATION/TRAINING PROGRAM

## 2022-02-06 PROCEDURE — 83935 ASSAY OF URINE OSMOLALITY: CPT | Mod: HCNC

## 2022-02-06 PROCEDURE — 80053 COMPREHEN METABOLIC PANEL: CPT | Mod: HCNC | Performed by: STUDENT IN AN ORGANIZED HEALTH CARE EDUCATION/TRAINING PROGRAM

## 2022-02-06 PROCEDURE — 25000003 PHARM REV CODE 250: Mod: HCNC | Performed by: STUDENT IN AN ORGANIZED HEALTH CARE EDUCATION/TRAINING PROGRAM

## 2022-02-06 PROCEDURE — 25000003 PHARM REV CODE 250: Mod: HCNC

## 2022-02-06 PROCEDURE — 85025 COMPLETE CBC W/AUTO DIFF WBC: CPT | Mod: HCNC | Performed by: STUDENT IN AN ORGANIZED HEALTH CARE EDUCATION/TRAINING PROGRAM

## 2022-02-06 RX ORDER — CARVEDILOL 3.12 MG/1
3.12 TABLET ORAL 2 TIMES DAILY
Qty: 60 TABLET | Refills: 11 | Status: SHIPPED | OUTPATIENT
Start: 2022-02-06 | End: 2022-05-03

## 2022-02-06 RX ORDER — SODIUM CHLORIDE, SODIUM LACTATE, POTASSIUM CHLORIDE, CALCIUM CHLORIDE 600; 310; 30; 20 MG/100ML; MG/100ML; MG/100ML; MG/100ML
INJECTION, SOLUTION INTRAVENOUS CONTINUOUS
Status: ACTIVE | OUTPATIENT
Start: 2022-02-06 | End: 2022-02-07

## 2022-02-06 RX ADMIN — RIVAROXABAN 2.5 MG: 2.5 TABLET, FILM COATED ORAL at 07:02

## 2022-02-06 RX ADMIN — MORPHINE SULFATE 15 MG: 15 TABLET ORAL at 10:02

## 2022-02-06 RX ADMIN — INSULIN DETEMIR 10 UNITS: 100 INJECTION, SOLUTION SUBCUTANEOUS at 09:02

## 2022-02-06 RX ADMIN — ATORVASTATIN CALCIUM 80 MG: 40 TABLET, FILM COATED ORAL at 09:02

## 2022-02-06 RX ADMIN — SENNOSIDES 8.6 MG: 8.6 TABLET ORAL at 09:02

## 2022-02-06 RX ADMIN — CLOPIDOGREL 75 MG: 75 TABLET, FILM COATED ORAL at 09:02

## 2022-02-06 RX ADMIN — INSULIN ASPART 5 UNITS: 100 INJECTION, SOLUTION INTRAVENOUS; SUBCUTANEOUS at 07:02

## 2022-02-06 RX ADMIN — CARVEDILOL 3.12 MG: 3.12 TABLET, FILM COATED ORAL at 09:02

## 2022-02-06 RX ADMIN — MEROPENEM 2 G: 1 INJECTION INTRAVENOUS at 07:02

## 2022-02-06 RX ADMIN — SODIUM CHLORIDE, SODIUM LACTATE, POTASSIUM CHLORIDE, AND CALCIUM CHLORIDE: .6; .31; .03; .02 INJECTION, SOLUTION INTRAVENOUS at 03:02

## 2022-02-06 RX ADMIN — SERTRALINE HYDROCHLORIDE 50 MG: 50 TABLET ORAL at 09:02

## 2022-02-06 RX ADMIN — RIVAROXABAN 2.5 MG: 2.5 TABLET, FILM COATED ORAL at 09:02

## 2022-02-06 RX ADMIN — INSULIN ASPART 2 UNITS: 100 INJECTION, SOLUTION INTRAVENOUS; SUBCUTANEOUS at 09:02

## 2022-02-06 RX ADMIN — MORPHINE SULFATE 15 MG: 15 TABLET ORAL at 03:02

## 2022-02-06 RX ADMIN — SODIUM CHLORIDE, SODIUM LACTATE, POTASSIUM CHLORIDE, AND CALCIUM CHLORIDE 1000 ML: .6; .31; .03; .02 INJECTION, SOLUTION INTRAVENOUS at 09:02

## 2022-02-06 RX ADMIN — HYDROCODONE BITARTRATE AND ACETAMINOPHEN 1 TABLET: 5; 325 TABLET ORAL at 07:02

## 2022-02-06 NOTE — PROGRESS NOTES
Alert and orient times 4. Has been drowsy most of this shift. Answer and follow simple request appropriately.  concern that pat had not voided much this shift. He changed her at 2200 lst PM. I check Bladder scan at 0600 results 616 ML. Md on call page Orders was placed in computer at 0644. Pt voided at 0715 post residual bladder scan performed result was 33 Ml. I went ahead after performing incontinent care and performed in and out cath to obtain urine sample. Result was 100 Ml cloudy yellow Color urine. Pt also had an incontinent BM moderate amount form brown stool that was changed with urine incontinent. I informed Day shift RN Francisca all these finding and asked that the call MD to see if Liter bolus is still needed. . Urine sample for lab was collect and to be sent to lab .

## 2022-02-06 NOTE — ASSESSMENT & PLAN NOTE
History of hypertension, without home meds currently.    -restart carvedilol 3.125 bid   - hold home valsartan since ROSLYN  -Q4 vitals to monitor and titrate as needed

## 2022-02-06 NOTE — ASSESSMENT & PLAN NOTE
HPatient with acute kidney injury likely d/t IVVD/Dehydration Which is currently worsening. Labs reviewed- Renal function/electrolytes with Estimated Creatinine Clearance: 33.6 mL/min (A) (based on SCr of 1.7 mg/dL (H)). according to latest data. Monitor urine output and serial BMP and adjust therapy as needed. Avoid nephrotoxins and renally dose meds for GFR listed above.   - improved with IVF, likely pre renal    Plan  - strict I/Os  - renally dose meds  - avoid nephrotoxic agents  - discontinue mirtazipine and hold lyrica

## 2022-02-06 NOTE — ASSESSMENT & PLAN NOTE
Patient has Hgb of 6.6 on admission  Stable     Plan  - transfuse 1U pRBC  - Transfuse another 1u pRBC on 2/4.   - Anemia workup with iron, TIBC, b12 panel and FOBT. No apparent sources of bleeding.  - discontinue apixaban, and start xarelto 2.5mg BID + plavix 75 mg  - CBC daily, transfuse Hgb < 7

## 2022-02-06 NOTE — SUBJECTIVE & OBJECTIVE
Interval History: NAEON. Afebrile. VSS. Urine retention and extreme drowsiness noted by night nurse that resolved before breakfast without intervention. Patient seen in bed eating breakfast. Reports good appetite and ready for rehab. Denies v/n/d/c, back/neck pain. Leg pain is well controlled. Discontinued mirtazipine.     Review of Systems   Constitutional: Negative for chills and fever.   HENT: Negative for congestion and rhinorrhea.    Eyes: Negative for pain and visual disturbance.   Respiratory: Negative for chest tightness and shortness of breath.    Cardiovascular: Negative for chest pain, palpitations and leg swelling.   Gastrointestinal: Negative for abdominal distention, abdominal pain, constipation, diarrhea, nausea and vomiting.   Genitourinary: Negative for difficulty urinating and dysuria.   Musculoskeletal: Negative for back pain and neck pain.   Skin: Negative for rash and wound.   Neurological: Negative for dizziness and headaches.   Psychiatric/Behavioral: Negative for confusion and sleep disturbance.     Objective:     Vital Signs (Most Recent):  Temp: 97.6 °F (36.4 °C) (02/06/22 1512)  Pulse: 84 (02/06/22 1512)  Resp: 16 (02/06/22 1512)  BP: 110/65 (02/06/22 1512)  SpO2: 98 % (02/06/22 1512) Vital Signs (24h Range):  Temp:  [97.6 °F (36.4 °C)-98.9 °F (37.2 °C)] 97.6 °F (36.4 °C)  Pulse:  [84-93] 84  Resp:  [16-18] 16  SpO2:  [92 %-100 %] 98 %  BP: (101-157)/(62-86) 110/65     Weight: 67.6 kg (149 lb 0.5 oz)  Body mass index is 24.8 kg/m².    Intake/Output Summary (Last 24 hours) at 2/6/2022 1529  Last data filed at 2/6/2022 1300  Gross per 24 hour   Intake 360 ml   Output --   Net 360 ml      Physical Exam  Vitals and nursing note reviewed.   Constitutional:       General: She is not in acute distress.     Appearance: Normal appearance. She is not ill-appearing or diaphoretic.   HENT:      Head: Normocephalic and atraumatic.      Right Ear: External ear normal.      Left Ear: External ear normal.       Nose: Nose normal. No congestion or rhinorrhea.      Mouth/Throat:      Mouth: Mucous membranes are moist.      Pharynx: Oropharynx is clear.   Eyes:      General: No scleral icterus.     Extraocular Movements: Extraocular movements intact.      Pupils: Pupils are equal, round, and reactive to light.   Cardiovascular:      Rate and Rhythm: Normal rate and regular rhythm.      Pulses: Normal pulses.      Heart sounds: Normal heart sounds.   Pulmonary:      Effort: Pulmonary effort is normal.      Breath sounds: Normal breath sounds. No wheezing, rhonchi or rales.   Abdominal:      General: Bowel sounds are normal. There is no distension.      Palpations: Abdomen is soft.      Tenderness: There is no abdominal tenderness. There is no right CVA tenderness or left CVA tenderness.   Musculoskeletal:         General: Normal range of motion.      Cervical back: Normal range of motion and neck supple. No tenderness.      Comments: R. BKA + L. AKA, R.BKA bandaged and can tolerate touch without pain.    Lymphadenopathy:      Cervical: No cervical adenopathy.   Skin:     General: Skin is warm and dry.   Neurological:      Mental Status: She is alert and oriented to person, place, and time.   Psychiatric:         Mood and Affect: Mood normal.         Behavior: Behavior normal.         Thought Content: Thought content normal.         Judgment: Judgment normal.         Significant Labs: All pertinent labs within the past 24 hours have been reviewed.    Significant Imaging: I have reviewed all pertinent imaging results/findings within the past 24 hours.

## 2022-02-06 NOTE — PLAN OF CARE
Problem: Adult Inpatient Plan of Care  Goal: Plan of Care Review  Outcome: Ongoing, Progressing  Goal: Patient-Specific Goal (Individualized)  Outcome: Ongoing, Progressing  Goal: Absence of Hospital-Acquired Illness or Injury  Outcome: Ongoing, Progressing  Goal: Optimal Comfort and Wellbeing  Outcome: Ongoing, Progressing  Goal: Readiness for Transition of Care  Outcome: Ongoing, Progressing     Problem: Diabetes Comorbidity  Goal: Blood Glucose Level Within Targeted Range  Outcome: Ongoing, Progressing     Problem: Adjustment to Illness (Sepsis/Septic Shock)  Goal: Optimal Coping  Outcome: Ongoing, Progressing     Problem: Bleeding (Sepsis/Septic Shock)  Goal: Absence of Bleeding  Outcome: Ongoing, Progressing     Problem: Glycemic Control Impaired (Sepsis/Septic Shock)  Goal: Blood Glucose Level Within Desired Range  Outcome: Ongoing, Progressing     Problem: Infection Progression (Sepsis/Septic Shock)  Goal: Absence of Infection Signs and Symptoms  Outcome: Ongoing, Progressing     Problem: Nutrition Impaired (Sepsis/Septic Shock)  Goal: Optimal Nutrition Intake  Outcome: Ongoing, Progressing     Problem: Fluid and Electrolyte Imbalance (Acute Kidney Injury/Impairment)  Goal: Fluid and Electrolyte Balance  Outcome: Ongoing, Progressing     Problem: Oral Intake Inadequate (Acute Kidney Injury/Impairment)  Goal: Optimal Nutrition Intake  Outcome: Ongoing, Progressing     Problem: Renal Function Impairment (Acute Kidney Injury/Impairment)  Goal: Effective Renal Function  Outcome: Ongoing, Progressing     Problem: Infection  Goal: Absence of Infection Signs and Symptoms  Outcome: Ongoing, Progressing     Problem: Impaired Wound Healing  Goal: Optimal Wound Healing  Outcome: Ongoing, Progressing     Problem: Skin Injury Risk Increased  Goal: Skin Health and Integrity  Outcome: Ongoing, Progressing     Problem: Fall Injury Risk  Goal: Absence of Fall and Fall-Related Injury  Outcome: Ongoing, Progressing   Pt is  A+Ox4. Possible discharge 2/6. Pain 6/10 @0958. Administered prn Hydrocodone. Pain level 4/10@ 1045am. Pain level 7/10 @ 1650. Administered prn morphine.  Pain level 4/10 @1750. BS 91. Pt wanted insulin held today d/t decreased po intake.

## 2022-02-06 NOTE — ASSESSMENT & PLAN NOTE
Patient on melatonin, remeron, zoloft     Plan  - restart zoloft 50mg  - PRN melatonin  - Discontinue remeron 2/2 to Urinary retention and myoclonus,

## 2022-02-06 NOTE — ASSESSMENT & PLAN NOTE
Patient has myoclonal jerks on exam  - likely in setting of pregabalin build with ROSLYN  - improving with holding meds    Plan  - Discontinue lyrica 50 mg BID;   - Discontinue mirtazipine  qhs  - follow up with PCP  - neuro checks  - delirium precautions

## 2022-02-06 NOTE — PROGRESS NOTES
"[facility]  Progress Note    Patient Name: Suyapa Connelly  MRN: [id number]  Patient Class: IP- Inpatient   Admission Date: 1/31/2022  Length of Stay: 5 days  Attending Physician: Flavia Delgado MD  Primary Care Provider: Magen Christensen MD        Subjective:     Principal Problem:Severe sepsis        HPI:  Suyapa Connelly is a 55 y/o female with PMHx CKD3, DMII, GERD, HLD, HTN, osteomyelitis, PVD, depression, and anxiety presents to ED with  for concerns of increased confusion from her rehab facility.  at bedside is concern for progressively worsening appearance of RLE wound, along with continued drainage, and worsening mental status of his wife which has progressed through this week. Patient emotionally labile and responds yes or no to most questions. She reports increased pain in her R leg with nausea over the past week. She also reports constipation without abdominal pain. She denies dysuria. Patient denies fevers, chills, vomiting, chest pain, cough, and shortness of breath.     Patient underwent right BKA by Dr. Rojas on 12/28 then discharged to rehab facility on 1/10. Since then she has recurrent hospital visits for the wound, requiring revisions and debridements; the last resulted in a pseudomonas positive wound culture resistant to cefepime 1/20. [facility] rehab RN stated that patient was "very confused this morning and more lethargic than normal." Per rehab, pt is normally AOx3 and able to follow commands. Rehab states they were concerned for sepsis due to elevated WBC and ROSLYN. Per , her last known normal was 1 weeks ago.    She was admitted to hospital medicine for severe sepsis.       Overview/Hospital Course:  Patient was admitted to hospital 1/31 for AMS and concern about worsening appearance of RLE wound. Patient was started on antibiotics (cefepime, vancomycin & azithromycin), blood, wound, resp & urine samples were collected for culture due to " concern for sepsis and ID and Gen Surg (s/p BKA on 12/28) were consulted. Pregabalin was withheld due to patient's myoclonus, likely 2/2 to ROSLYN leading to increased serum concentrations. Patient's mental status slowly improving throughout hospitalization, likely AMS due to both pregabalin intox and sepsis. ROSLYN resolved with IVF and abx. Increasing limb pain, pregabalin restarted at low dose. Azithromycin and vanc discontinued and continuing with meropenem. ID with final recs to continue through the 7th with meropenem, patient medically ready to return to rehab once Hgb stabilizes and after transfusion. Holding pregabalin and mirtazapine due to return of myoclonus. Improved cognition today.      Interval History: NAEON. Afebrile. VSS. Urine retention and extreme drowsiness noted by night nurse that resolved before breakfast without intervention. Patient seen in bed eating breakfast. Reports good appetite and ready for rehab. Denies v/n/d/c, back/neck pain. Leg pain is well controlled. Discontinued mirtazipine.     Review of Systems   Constitutional: Negative for chills and fever.   HENT: Negative for congestion and rhinorrhea.    Eyes: Negative for pain and visual disturbance.   Respiratory: Negative for chest tightness and shortness of breath.    Cardiovascular: Negative for chest pain, palpitations and leg swelling.   Gastrointestinal: Negative for abdominal distention, abdominal pain, constipation, diarrhea, nausea and vomiting.   Genitourinary: Negative for difficulty urinating and dysuria.   Musculoskeletal: Negative for back pain and neck pain.   Skin: Negative for rash and wound.   Neurological: Negative for dizziness and headaches.   Psychiatric/Behavioral: Negative for confusion and sleep disturbance.     Objective:     Vital Signs (Most Recent):  Temp: 97.6 °F (36.4 °C) (02/06/22 1512)  Pulse: 84 (02/06/22 1512)  Resp: 16 (02/06/22 1512)  BP: 110/65 (02/06/22 1512)  SpO2: 98 % (02/06/22 1512) Vital Signs  (24h Range):  Temp:  [97.6 °F (36.4 °C)-98.9 °F (37.2 °C)] 97.6 °F (36.4 °C)  Pulse:  [84-93] 84  Resp:  [16-18] 16  SpO2:  [92 %-100 %] 98 %  BP: (101-157)/(62-86) 110/65     Weight: 67.6 kg (149 lb 0.5 oz)  Body mass index is 24.8 kg/m².    Intake/Output Summary (Last 24 hours) at 2/6/2022 1529  Last data filed at 2/6/2022 1300  Gross per 24 hour   Intake 360 ml   Output --   Net 360 ml      Physical Exam  Vitals and nursing note reviewed.   Constitutional:       General: She is not in acute distress.     Appearance: Normal appearance. She is not ill-appearing or diaphoretic.   HENT:      Head: Normocephalic and atraumatic.      Right Ear: External ear normal.      Left Ear: External ear normal.      Nose: Nose normal. No congestion or rhinorrhea.      Mouth/Throat:      Mouth: Mucous membranes are moist.      Pharynx: Oropharynx is clear.   Eyes:      General: No scleral icterus.     Extraocular Movements: Extraocular movements intact.      Pupils: Pupils are equal, round, and reactive to light.   Cardiovascular:      Rate and Rhythm: Normal rate and regular rhythm.      Pulses: Normal pulses.      Heart sounds: Normal heart sounds.   Pulmonary:      Effort: Pulmonary effort is normal.      Breath sounds: Normal breath sounds. No wheezing, rhonchi or rales.   Abdominal:      General: Bowel sounds are normal. There is no distension.      Palpations: Abdomen is soft.      Tenderness: There is no abdominal tenderness. There is no right CVA tenderness or left CVA tenderness.   Musculoskeletal:         General: Normal range of motion.      Cervical back: Normal range of motion and neck supple. No tenderness.      Comments: R. BKA + L. AKA, R.BKA bandaged and can tolerate touch without pain.    Lymphadenopathy:      Cervical: No cervical adenopathy.   Skin:     General: Skin is warm and dry.   Neurological:      Mental Status: She is alert and oriented to person, place, and time.   Psychiatric:         Mood and Affect:  Mood normal.         Behavior: Behavior normal.         Thought Content: Thought content normal.         Judgment: Judgment normal.         Significant Labs: All pertinent labs within the past 24 hours have been reviewed.    Significant Imaging: I have reviewed all pertinent imaging results/findings within the past 24 hours.      Assessment/Plan:      * Severe sepsis  Ms. Connelly is a 57 yo female s/p r BKA and hx of Pseudomonas infection at the site of surgery presenting with AMS, leukocytosis, and elevated procalc  - likely sepsis due to wound infection/OM  - ESR and CRP slightly elevated  - Procalc of 0.38  -MRI Sacrum and R leg without evidence of OM  - Gen Surg following given repeated wound infection s/p BKA, no surgical intervention, signing off  - blood cx x2, wound cx, resp cx NGTD  - urine cx NGTD to r/o other source     Plan  - wound care consulted  - Inflammatory markers elevated, confirming likely source is wound  - CBC daily to trend WCC  - meropenem 2g q12 until 2/7 per ID recs        Myoclonus  Patient has myoclonal jerks on exam  - likely in setting of pregabalin build with ROSLYN  - improving with holding meds    Plan  - Discontinue lyrica 50 mg BID;   - Discontinue mirtazipine  qhs  - follow up with PCP  - neuro checks  - delirium precautions      ROSLYN (acute kidney injury)  HPatient with acute kidney injury likely d/t IVVD/Dehydration Which is currently worsening. Labs reviewed- Renal function/electrolytes with Estimated Creatinine Clearance: 33.6 mL/min (A) (based on SCr of 1.7 mg/dL (H)). according to latest data. Monitor urine output and serial BMP and adjust therapy as needed. Avoid nephrotoxins and renally dose meds for GFR listed above.   - improved with IVF, likely pre renal    Plan  - strict I/Os  - renally dose meds  - avoid nephrotoxic agents  - discontinue mirtazipine and hold lyrica      Altered mental state  Likely in setting of infection  - CTH negative    Plan  - neuro checks  -  delirium precautions      Decubitus ulcer of sacral region, unstageable  MRI Sacrum W Out contrast (in setting of ROSLYN) without signs of osteomyelitis.    - Wound care consulted, appreciate recs  - Cleanse with cleaning cloths, apply Triad ointment BID/prn cleansing    S/P BKA (below knee amputation) unilateral, right  Patient had recent BKA at [facility] with recent debridement last week  - patient has active drainage from wound with continued pain and complications    Plan  - morphine 15 mg PO q4 PRN for severe pain  - Norco 5mg PRN q6 for moderate pain  - Wound Care consulted, appreciate recs  - Gen Surg states no surgical intervention recommended at this time.   - ID recommend to continue with meropenem through 2/7    Hypoalbuminemia  - Boost glucose control TIDWM   - Heme/onc outpatient referral    Impaired functional mobility and endurance  PT/OT consulted, recommend returning to Rehab, awaiting acceptance    Chronic anemia    Chronic disease - MCV & MCH normal; denies acute blood loss, no indication for transfusion at this time.   Elevated TIBC, ferritin  Decreased transferrin    - Plan to transfuse if <7 with active bleeding    Type 2 diabetes mellitus with diabetic nephropathy, with long-term current use of insulin  Lab Results   Component Value Date    HGBA1C 9.6 (H) 12/03/2021     Plan  - Regular diet due to patient request  - MDSSI PRN with POCT glu TIDWM + QHS  - detemir 10 QD  - Aspart 5 TIDWM  - increase basal and add bolus regimen per requirements      Acute blood loss anemia  Patient has Hgb of 6.6 on admission  Stable     Plan  - transfuse 1U pRBC  - Transfuse another 1u pRBC on 2/4.   - Anemia workup with iron, TIBC, b12 panel and FOBT. No apparent sources of bleeding.  - discontinue apixaban, and start xarelto 2.5mg BID + plavix 75 mg  - CBC daily, transfuse Hgb < 7      Mixed hyperlipidemia  Patient on atorvastatin 80mg at home    Plan  - continue home med     CAROLIN (generalized anxiety  disorder)  Patient on melatonin, remeron, zoloft     Plan  - restart zoloft 50mg  - PRN melatonin  - Discontinue remeron 2/2 to Urinary retention and myoclonus,       Essential hypertension  History of hypertension, without home meds currently.    -restart carvedilol 3.125 bid   - hold home valsartan since ROSLYN  -Q4 vitals to monitor and titrate as needed    Moderate malnutrition  Boost TIDWM      VTE Risk Mitigation (From admission, onward)           Ordered     IP VTE HIGH RISK PATIENT  Once         01/31/22 1930                    Discharge Planning   DEVAN: 2/7/2022     Code Status: Prior   Is the patient medically ready for discharge?: Yes    Reason for patient still in hospital (select all that apply): Patient trending condition, Laboratory test, Treatment and Pending disposition  Discharge Plan A: Rehab   Discharge Delays: None known at this time    Dominik Manley MD  Department of Hospital Medicine   [facility] - Med Surg                      02/06/2022                             STAFF PHYSICIAN NOTE                                   Attending Attestation for Rounds with Resident  I have reviewed and concur with the resident's history, physical, assessment, and plan.  I have personally interviewed and examined the patient at bedside and agree with the resident's findings.                55 yo with hx of bilat BKA, T2DM, depression, neuropathic pain presenting with recurrent sepsis likely due to wound infection (stump vs sacral) and PNA.  Long discussion w pt and her  about her care and welfare. On meropenem until 2/7. Acute urinary retention, ROSLYN on CKD4. Cr 1.5->1.7.                            Flavia Delgado MD  Senior Hospitalist  [id number], [phone]

## 2022-02-06 NOTE — ASSESSMENT & PLAN NOTE
Patient had recent BKA at [facility] with recent debridement last week  - patient has active drainage from wound with continued pain and complications    Plan  - morphine 15 mg PO q4 PRN for severe pain  - Norco 5mg PRN q6 for moderate pain  - Wound Care consulted, appreciate recs  - Gen Surg states no surgical intervention recommended at this time.   - ID recommend to continue with meropenem through 2/7

## 2022-02-07 PROBLEM — L30.8 DERMATITIS ASSOCIATED WITH MOISTURE: Status: ACTIVE | Noted: 2022-02-07

## 2022-02-07 LAB
ALBUMIN SERPL BCP-MCNC: 1.7 G/DL (ref 3.5–5.2)
ALP SERPL-CCNC: 83 U/L (ref 55–135)
ALT SERPL W/O P-5'-P-CCNC: 10 U/L (ref 10–44)
ANION GAP SERPL CALC-SCNC: 8 MMOL/L (ref 8–16)
ANION GAP SERPL CALC-SCNC: 9 MMOL/L (ref 8–16)
AST SERPL-CCNC: 16 U/L (ref 10–40)
BASOPHILS # BLD AUTO: 0.07 K/UL (ref 0–0.2)
BASOPHILS # BLD AUTO: 0.07 K/UL (ref 0–0.2)
BASOPHILS NFR BLD: 0.7 % (ref 0–1.9)
BASOPHILS NFR BLD: 0.8 % (ref 0–1.9)
BILIRUB SERPL-MCNC: 0.2 MG/DL (ref 0.1–1)
BUN SERPL-MCNC: 62 MG/DL (ref 6–20)
BUN SERPL-MCNC: 65 MG/DL (ref 6–20)
CALCIUM SERPL-MCNC: 9.4 MG/DL (ref 8.7–10.5)
CALCIUM SERPL-MCNC: 9.4 MG/DL (ref 8.7–10.5)
CHLORIDE SERPL-SCNC: 101 MMOL/L (ref 95–110)
CHLORIDE SERPL-SCNC: 104 MMOL/L (ref 95–110)
CO2 SERPL-SCNC: 25 MMOL/L (ref 23–29)
CO2 SERPL-SCNC: 25 MMOL/L (ref 23–29)
CREAT SERPL-MCNC: 1.8 MG/DL (ref 0.5–1.4)
CREAT SERPL-MCNC: 1.8 MG/DL (ref 0.5–1.4)
DIFFERENTIAL METHOD: ABNORMAL
DIFFERENTIAL METHOD: ABNORMAL
EOSINOPHIL # BLD AUTO: 0.5 K/UL (ref 0–0.5)
EOSINOPHIL # BLD AUTO: 0.6 K/UL (ref 0–0.5)
EOSINOPHIL NFR BLD: 5.4 % (ref 0–8)
EOSINOPHIL NFR BLD: 6.7 % (ref 0–8)
ERYTHROCYTE [DISTWIDTH] IN BLOOD BY AUTOMATED COUNT: 15.6 % (ref 11.5–14.5)
ERYTHROCYTE [DISTWIDTH] IN BLOOD BY AUTOMATED COUNT: 15.7 % (ref 11.5–14.5)
EST. GFR  (AFRICAN AMERICAN): 36 ML/MIN/1.73 M^2
EST. GFR  (AFRICAN AMERICAN): 36 ML/MIN/1.73 M^2
EST. GFR  (NON AFRICAN AMERICAN): 31.2 ML/MIN/1.73 M^2
EST. GFR  (NON AFRICAN AMERICAN): 31.2 ML/MIN/1.73 M^2
GLUCOSE SERPL-MCNC: 107 MG/DL (ref 70–110)
GLUCOSE SERPL-MCNC: 81 MG/DL (ref 70–110)
HCT VFR BLD AUTO: 23.8 % (ref 37–48.5)
HCT VFR BLD AUTO: 28.4 % (ref 37–48.5)
HGB BLD-MCNC: 7.4 G/DL (ref 12–16)
HGB BLD-MCNC: 8.7 G/DL (ref 12–16)
IMM GRANULOCYTES # BLD AUTO: 0.03 K/UL (ref 0–0.04)
IMM GRANULOCYTES # BLD AUTO: 0.06 K/UL (ref 0–0.04)
IMM GRANULOCYTES NFR BLD AUTO: 0.4 % (ref 0–0.5)
IMM GRANULOCYTES NFR BLD AUTO: 0.6 % (ref 0–0.5)
LYMPHOCYTES # BLD AUTO: 1.7 K/UL (ref 1–4.8)
LYMPHOCYTES # BLD AUTO: 2.2 K/UL (ref 1–4.8)
LYMPHOCYTES NFR BLD: 20.1 % (ref 18–48)
LYMPHOCYTES NFR BLD: 21.7 % (ref 18–48)
MAGNESIUM SERPL-MCNC: 2.4 MG/DL (ref 1.6–2.6)
MCH RBC QN AUTO: 29.6 PG (ref 27–31)
MCH RBC QN AUTO: 29.6 PG (ref 27–31)
MCHC RBC AUTO-ENTMCNC: 30.6 G/DL (ref 32–36)
MCHC RBC AUTO-ENTMCNC: 31.1 G/DL (ref 32–36)
MCV RBC AUTO: 95 FL (ref 82–98)
MCV RBC AUTO: 97 FL (ref 82–98)
MONOCYTES # BLD AUTO: 1.1 K/UL (ref 0.3–1)
MONOCYTES # BLD AUTO: 1.2 K/UL (ref 0.3–1)
MONOCYTES NFR BLD: 12 % (ref 4–15)
MONOCYTES NFR BLD: 13 % (ref 4–15)
NEUTROPHILS # BLD AUTO: 4.9 K/UL (ref 1.8–7.7)
NEUTROPHILS # BLD AUTO: 5.9 K/UL (ref 1.8–7.7)
NEUTROPHILS NFR BLD: 59 % (ref 38–73)
NEUTROPHILS NFR BLD: 59.6 % (ref 38–73)
NRBC BLD-RTO: 0 /100 WBC
NRBC BLD-RTO: 0 /100 WBC
PHOSPHATE SERPL-MCNC: 6.2 MG/DL (ref 2.7–4.5)
PLATELET # BLD AUTO: 358 K/UL (ref 150–450)
PLATELET # BLD AUTO: 436 K/UL (ref 150–450)
PMV BLD AUTO: 11.3 FL (ref 9.2–12.9)
PMV BLD AUTO: 11.7 FL (ref 9.2–12.9)
POCT GLUCOSE: 129 MG/DL (ref 70–110)
POCT GLUCOSE: 150 MG/DL (ref 70–110)
POCT GLUCOSE: 155 MG/DL (ref 70–110)
POCT GLUCOSE: 73 MG/DL (ref 70–110)
POTASSIUM SERPL-SCNC: 4.6 MMOL/L (ref 3.5–5.1)
POTASSIUM SERPL-SCNC: 4.7 MMOL/L (ref 3.5–5.1)
PROT SERPL-MCNC: 6.7 G/DL (ref 6–8.4)
RBC # BLD AUTO: 2.5 M/UL (ref 4–5.4)
RBC # BLD AUTO: 2.94 M/UL (ref 4–5.4)
SODIUM SERPL-SCNC: 135 MMOL/L (ref 136–145)
SODIUM SERPL-SCNC: 137 MMOL/L (ref 136–145)
VIT B12 SERPL-MCNC: 361 NG/L (ref 180–914)
WBC # BLD AUTO: 8.25 K/UL (ref 3.9–12.7)
WBC # BLD AUTO: 9.93 K/UL (ref 3.9–12.7)

## 2022-02-07 PROCEDURE — 25000003 PHARM REV CODE 250: Mod: HCNC | Performed by: HOSPITALIST

## 2022-02-07 PROCEDURE — 36415 COLL VENOUS BLD VENIPUNCTURE: CPT | Mod: HCNC | Performed by: STUDENT IN AN ORGANIZED HEALTH CARE EDUCATION/TRAINING PROGRAM

## 2022-02-07 PROCEDURE — 27000207 HC ISOLATION: Mod: HCNC

## 2022-02-07 PROCEDURE — 25000003 PHARM REV CODE 250: Mod: HCNC | Performed by: STUDENT IN AN ORGANIZED HEALTH CARE EDUCATION/TRAINING PROGRAM

## 2022-02-07 PROCEDURE — 85025 COMPLETE CBC W/AUTO DIFF WBC: CPT | Mod: 91,HCNC | Performed by: STUDENT IN AN ORGANIZED HEALTH CARE EDUCATION/TRAINING PROGRAM

## 2022-02-07 PROCEDURE — 99233 SBSQ HOSP IP/OBS HIGH 50: CPT | Mod: HCNC,,, | Performed by: HOSPITALIST

## 2022-02-07 PROCEDURE — 94761 N-INVAS EAR/PLS OXIMETRY MLT: CPT | Mod: HCNC

## 2022-02-07 PROCEDURE — 11000001 HC ACUTE MED/SURG PRIVATE ROOM: Mod: HCNC

## 2022-02-07 PROCEDURE — 99233 PR SUBSEQUENT HOSPITAL CARE,LEVL III: ICD-10-PCS | Mod: HCNC,,, | Performed by: HOSPITALIST

## 2022-02-07 PROCEDURE — 84100 ASSAY OF PHOSPHORUS: CPT | Mod: HCNC | Performed by: STUDENT IN AN ORGANIZED HEALTH CARE EDUCATION/TRAINING PROGRAM

## 2022-02-07 PROCEDURE — 85025 COMPLETE CBC W/AUTO DIFF WBC: CPT | Mod: HCNC | Performed by: STUDENT IN AN ORGANIZED HEALTH CARE EDUCATION/TRAINING PROGRAM

## 2022-02-07 PROCEDURE — 25000003 PHARM REV CODE 250: Mod: HCNC

## 2022-02-07 PROCEDURE — 80048 BASIC METABOLIC PNL TOTAL CA: CPT | Mod: HCNC | Performed by: STUDENT IN AN ORGANIZED HEALTH CARE EDUCATION/TRAINING PROGRAM

## 2022-02-07 PROCEDURE — 63600175 PHARM REV CODE 636 W HCPCS: Mod: HCNC | Performed by: HOSPITALIST

## 2022-02-07 PROCEDURE — 83735 ASSAY OF MAGNESIUM: CPT | Mod: HCNC | Performed by: STUDENT IN AN ORGANIZED HEALTH CARE EDUCATION/TRAINING PROGRAM

## 2022-02-07 PROCEDURE — 99232 SBSQ HOSP IP/OBS MODERATE 35: CPT | Mod: HCNC,,, | Performed by: NURSE PRACTITIONER

## 2022-02-07 PROCEDURE — 80053 COMPREHEN METABOLIC PANEL: CPT | Mod: HCNC | Performed by: STUDENT IN AN ORGANIZED HEALTH CARE EDUCATION/TRAINING PROGRAM

## 2022-02-07 PROCEDURE — 99232 PR SUBSEQUENT HOSPITAL CARE,LEVL II: ICD-10-PCS | Mod: HCNC,,, | Performed by: NURSE PRACTITIONER

## 2022-02-07 RX ORDER — PREGABALIN 75 MG/1
75 CAPSULE ORAL 2 TIMES DAILY
Qty: 60 CAPSULE | Refills: 2 | Status: SHIPPED | OUTPATIENT
Start: 2022-02-07 | End: 2022-02-07 | Stop reason: HOSPADM

## 2022-02-07 RX ORDER — MORPHINE SULFATE 20 MG/ML
10 SOLUTION ORAL EVERY 4 HOURS PRN
Status: DISCONTINUED | OUTPATIENT
Start: 2022-02-07 | End: 2022-02-07

## 2022-02-07 RX ADMIN — HYDROCODONE BITARTRATE AND ACETAMINOPHEN 1 TABLET: 5; 325 TABLET ORAL at 10:02

## 2022-02-07 RX ADMIN — ATORVASTATIN CALCIUM 80 MG: 40 TABLET, FILM COATED ORAL at 09:02

## 2022-02-07 RX ADMIN — INSULIN DETEMIR 10 UNITS: 100 INJECTION, SOLUTION SUBCUTANEOUS at 09:02

## 2022-02-07 RX ADMIN — RIVAROXABAN 2.5 MG: 2.5 TABLET, FILM COATED ORAL at 05:02

## 2022-02-07 RX ADMIN — CARVEDILOL 3.12 MG: 3.12 TABLET, FILM COATED ORAL at 08:02

## 2022-02-07 RX ADMIN — SERTRALINE HYDROCHLORIDE 50 MG: 50 TABLET ORAL at 08:02

## 2022-02-07 RX ADMIN — HYDROCODONE BITARTRATE AND ACETAMINOPHEN 1 TABLET: 5; 325 TABLET ORAL at 08:02

## 2022-02-07 RX ADMIN — SENNOSIDES 8.6 MG: 8.6 TABLET ORAL at 08:02

## 2022-02-07 RX ADMIN — MEROPENEM 2 G: 1 INJECTION INTRAVENOUS at 08:02

## 2022-02-07 RX ADMIN — CLOPIDOGREL 75 MG: 75 TABLET, FILM COATED ORAL at 08:02

## 2022-02-07 RX ADMIN — MEROPENEM 2 G: 1 INJECTION INTRAVENOUS at 09:02

## 2022-02-07 RX ADMIN — RIVAROXABAN 2.5 MG: 2.5 TABLET, FILM COATED ORAL at 08:02

## 2022-02-07 RX ADMIN — CARVEDILOL 3.12 MG: 3.12 TABLET, FILM COATED ORAL at 09:02

## 2022-02-07 NOTE — HPI
Suyapa Connelly is a 56 year old female with PMHx of CKD3, DMII, GERD, HLD, HTN, osteomyelitis, PVD, depression, and anxiety presents to ED with  for concerns of increased confusion from her rehab facility.  at bedside is concerned for progressively worsening appearance of RLE wound, along with continued drainage, and worsening mental status of his wife which has progressed through this week. Wound care consulted for evaluation of sacral wound.

## 2022-02-07 NOTE — PLAN OF CARE
Problem: Adult Inpatient Plan of Care  Goal: Plan of Care Review  Outcome: Ongoing, Progressing  Goal: Absence of Hospital-Acquired Illness or Injury  Outcome: Ongoing, Progressing  Goal: Optimal Comfort and Wellbeing  Outcome: Ongoing, Progressing     Problem: Glycemic Control Impaired (Sepsis/Septic Shock)  Goal: Blood Glucose Level Within Desired Range  Outcome: Ongoing, Progressing   Alert and orient 4/ times 4.Pt was monitor every 2 hours and PRN this shift. Continue to receive IV antibiotic. LR was discontinued per MD orders. BG at HS was 250 . Sliding scale given as ordered. Reposition every 2 hours. Incontinent care performed.  at bedside this shift assisting with care. Complain once of pain to Rt stump.VSS.

## 2022-02-07 NOTE — CONSULTS
Andrew jose luis - [facility] Surg  Nephrology  Consult Note    Patient Name: Suyapa Connelly  MRN: [id number]  Admission Date: 1/31/2022  Hospital Length of Stay: 6 days  Attending Provider: Flavia Delgado MD   Primary Care Physician: Magen Christensen MD  Principal Problem:Severe sepsis    Inpatient consult to Nephrology  Consult performed by: Rolando Rivas MD  Consult ordered by: Tracy Blair MD  Reason for consult: roslyn  Assessment/Recommendations: No intervention needed        Subjective:     HPI: 56 yea rold female with DM2, CKD4, HTN, HLD, GERD, sacral decub, PVD s/p Bilateral AKA most recently right AKA on 12/28 with revision 1/24/22 and recent admission  for presumed surgical stump site infection on IV abx  at rehab who presented for acute encephalopathy and ROSLYN with concern for sepsis.    Nephrology consulted for ROSLYN      Past Medical History:   Diagnosis Date    Anxiety     Chronic pain syndrome     CKD (chronic kidney disease), stage III     Depression     Diabetes mellitus     type 2    Diabetes mellitus, type 2     GERD (gastroesophageal reflux disease)     Hyperemesis 3/23/2021    Hyperlipemia     Hypertension     Hypokalemia 3/23/2021    Myocardial infarction 2010    minor-caused by stress per pt.    Osteomyelitis     Osteomyelitis of left foot 4/30/2021    PVD (peripheral vascular disease)     Vaginal delivery     x1       Past Surgical History:   Procedure Laterality Date    ABOVE-KNEE AMPUTATION Left 5/18/2021    Procedure: AMPUTATION, ABOVE KNEE;  Surgeon: Teddy Huber MD;  Location: [address];  Service: Vascular;  Laterality: Left;    Angiogram - Right Extremity Right 7/9/15    angiogram-left leg  10/6/15    ANGIOGRAPHY OF LOWER EXTREMITY Left 4/29/2021    Procedure: ANGIOGRAM, LOWER EXTREMITY;  Surgeon: Teddy Huber MD;  Location: [facility] OR [address] [place] [address];  Service: Vascular;  Laterality: Left;    BELOW KNEE AMPUTATION OF LOWER EXTREMITY Right 12/28/2021    Procedure:  AMPUTATION, BELOW KNEE;  Surgeon: Kaitlyn Rojas MD;  Location: [facility] OR;  Service: General;  Laterality: Right;    CATHETERIZATION OF BOTH LEFT AND RIGHT HEART N/A 12/18/2019    Procedure: CATHETERIZATION, HEART, BOTH LEFT AND RIGHT;  Surgeon: Que Fernando III, MD;  Location: [facility] CATH LAB;  Service: Cardiology;  Laterality: N/A;    CORONARY ANGIOGRAPHY N/A 12/18/2019    Procedure: ANGIOGRAM, CORONARY ARTERY;  Surgeon: Que Fernando III, MD;  Location: [facility] CATH LAB;  Service: Cardiology;  Laterality: N/A;    CORONARY ANGIOGRAPHY INCLUDING BYPASS GRAFTS WITH CATHETERIZATION OF LEFT HEART N/A 7/28/2020    Procedure: ANGIOGRAM, CORONARY, INCLUDING BYPASS GRAFT, WITH LEFT HEART CATHETERIZATION, 9 am;  Surgeon: Rachel Easley MD;  Location: [facility] CATH LAB;  Service: Cardiology;  Laterality: N/A;    CORONARY ARTERY BYPASS GRAFT (CABG) N/A 1/14/2020    Procedure: CORONARY ARTERY BYPASS GRAFT (CABG) x 1     Off Pump;  Surgeon: Huang Altamirano MD;  Location: [address];  Service: Cardiovascular;  Laterality: N/A;    CREATION OF FEMORAL-TIBIAL ARTERY BYPASS Left 4/29/2021    Procedure: CREATION, BYPASS, ARTERIAL, FEMORAL TO ANTERIOR TIBIAL;  Surgeon: Teddy Huber MD;  Location: [address];  Service: Vascular;  Laterality: Left;    CREATION OF FEMOROPOPLITEAL ARTERIAL BYPASS USING GRAFT Left 8/18/2020    Procedure: CREATION, BYPASS, ARTERIAL, FEMORAL TO POPLITEAL, USING GRAFT, LEFT LOWER EXTREMITY;  Surgeon: Teddy Huber MD;  Location: [facility];  Service: Vascular;  Laterality: Left;  REQUEST 7:15 A.M. START----COVID NEGATIVE ON 8/17  1ST CASE STARTE PER LEANA ON 8/7/2020 @ 942AM-LO  RN PREOP 8/12/2020   T/S-----CLEARED BY CARDS-------PENDING INSURANCE    DEBRIDEMENT OF FOOT Left 9/8/2020    Procedure: DEBRIDEMENT, FOOT;  Surgeon: Rosio Mayes DPM;  Location: [facility];  Service: Podiatry;  Laterality: Left;  request neoxx .   RN Pre Op 9-4-2020, Covid negative on  9/5/20. C A    DEBRIDEMENT OF FOOT  3/4/2021    Procedure: DEBRIDEMENT, FOOT;  Surgeon: Teddy Huber MD;  Location: [facility] OR;  Service: Vascular;;    DEBRIDEMENT OF FOOT Left 3/9/2021    Procedure: DEBRIDEMENT, FOOT, bone biopsy;  Surgeon: Rosio Mayes DPM;  Location: [facility] OR;  Service: Podiatry;  Laterality: Left;  Request neoxx---COVID IN AM  REQUESTING NOON START  RN Phone Pre op.On Blood thinners Plavix and Eliquis.  Covid am of surgery. C A    DEBRIDEMENT OF FOOT Left 5/4/2021    Procedure: DEBRIDEMENT, FOOT;  Surgeon: Farooq Morley DPM;  [facility];  Service: Podiatry;  Laterality: Left;    INSERTION OF TUNNELED CENTRAL VENOUS HEMODIALYSIS CATHETER N/A 1/27/2020    Procedure: Insertion, Catheter, Central Venous, Hemodialysis;  Surgeon: ESTEBAN Gomez III, MD;  Location: [facility] CATH LAB;  Service: Peripheral Vascular;  Laterality: N/A;    PERCUTANEOUS TRANSLUMINAL ANGIOPLASTY N/A 3/4/2021    Procedure: PTA (ANGIOPLASTY, PERCUTANEOUS, TRANSLUMINAL);  Surgeon: Teddy Huber MD;  Location: [facility] OR;  Service: Vascular;  Laterality: N/A;    REMOVAL OF ARTERIOVENOUS GRAFT Left 5/27/2021    Procedure: REMOVAL, GRAFT, LEFT LOWER EXTREMITY, WOUND EXPLORATION;  Surgeon: Teddy Huber MD;  [facility];  Service: Vascular;  Laterality: Left;    REMOVAL OF NAIL OF DIGIT Left 3/9/2021    Procedure: REMOVAL, NAIL, DIGIT;  Surgeon: Rosio Mayes DPM;  Location: [facility] OR;  Service: Podiatry;  Laterality: Left;    THROMBECTOMY Left 3/4/2021    Procedure: THROMBECTOMY, LEFT LOWER EXTREMITY BYPASS GRAFT, ANGIOGRAM, POSSIBLE INTERVENTION, POSSIBLE LEFT LOWER EXTREMITY BYPASS;  Surgeon: Teddy Huber MD;  Location: [facility] OR;  Service: Vascular;  Laterality: Left;    THROMBECTOMY Left 4/29/2021    Procedure: GRAFT THROMBECTOMY, LEFT LOWER EXTREMITY;  Surgeon: Teddy Huber MD;  [facility];  Service: Vascular;  Laterality: Left;  14.5  min  1179.85 mGy  341.01 Gycm2  240 ml dye    THROMBECTOMY  10/22/2021    Procedure: THROMBECTOMY;  Surgeon: Saad Arenas MD;  Location: [facility] CATH LAB/EP;  Service: Cardiology;;       Review of patient's allergies indicates:   Allergen Reactions    Ciprofloxacin Itching    Contrast media      Kidney injury    Iodine      Kidney injury     Current Facility-Administered Medications   Medication Frequency    acetaminophen tablet 650 mg Q4H PRN    albuterol sulfate nebulizer solution 2.5 mg Q4H PRN    atorvastatin tablet 80 mg QHS    carvediloL tablet 3.125 mg BID    clopidogreL tablet 75 mg Daily    COVID-19 vac, aden(Pfizer)(PF) (Pfizer COVID-19) 30 mcg/0.3 mL injection 0.3 mL vaccine x 1 dose    dextrose 10% bolus 125 mL PRN    dextrose 10% bolus 250 mL PRN    glucagon (human recombinant) injection 1 mg PRN    glucose chewable tablet 16 g PRN    glucose chewable tablet 24 g PRN    hydrALAZINE tablet 25 mg Q8H PRN    HYDROcodone-acetaminophen 5-325 mg per tablet 1 tablet Q6H PRN    insulin aspart U-100 pen 1-10 Units QID (AC + HS) PRN    insulin aspart U-100 pen 5 Units TIDWM    insulin detemir U-100 pen 10 Units QHS    lactated ringers bolus 1,000 mL Once    melatonin tablet 6 mg Nightly PRN    naloxone 0.4 mg/mL injection 0.02 mg PRN    ondansetron disintegrating tablet 8 mg Q8H PRN    polyethylene glycol packet 17 g Daily    rivaroxaban tablet 2.5 mg BID WM    senna tablet 8.6 mg Daily    sertraline tablet 50 mg Daily    sodium chloride 0.9% flush 10 mL Q12H PRN     Family History     Problem Relation (Age of Onset)    Diabetes Mother, Father, Paternal Grandmother    Heart disease Maternal Grandmother    No Known Problems Maternal Grandfather, Paternal Grandfather        Tobacco Use    Smoking status: Former Smoker    Smokeless tobacco: Never Used   Substance and Sexual Activity    Alcohol use: No    Drug use: Yes     Types: Marijuana     Comment: occassional    Sexual activity:  Yes     Partners: Male     Review of Systems   Constitutional: Negative.    HENT: Negative.    Eyes: Negative.    Respiratory: Negative.    Cardiovascular: Negative.    Gastrointestinal: Negative.    Endocrine: Negative.    Genitourinary: Negative.    Musculoskeletal: Negative.    Skin: Negative.    Neurological: Negative.    Psychiatric/Behavioral: Positive for confusion.     Objective:     Vital Signs (Most Recent):  Temp: 97.3 °F (36.3 °C) (02/07/22 1149)  Pulse: 90 (02/07/22 1149)  Resp: 16 (02/07/22 1149)  BP: 117/66 (02/07/22 1149)  SpO2: 97 % (02/07/22 1251)  O2 Device (Oxygen Therapy): room air (02/07/22 1251) Vital Signs (24h Range):  Temp:  [97.1 °F (36.2 °C)-98.5 °F (36.9 °C)] 97.3 °F (36.3 °C)  Pulse:  [80-99] 90  Resp:  [14-18] 16  SpO2:  [92 %-97 %] 97 %  BP: (106-152)/(57-82) 117/66     Weight: 67.6 kg (149 lb 0.5 oz) (02/01/22 1608)  Body mass index is 24.8 kg/m².  Body surface area is 1.76 meters squared.    I/O last 3 completed shifts:  In: 1460 [P.O.:960; I.V.:400; IV Piggyback:100]  Out: -     Physical Exam  Constitutional:       General: She is not in acute distress.     Appearance: She is obese.   HENT:      Mouth/Throat:      Mouth: Mucous membranes are moist.   Eyes:      General: No scleral icterus.     Extraocular Movements: Extraocular movements intact.      Pupils: Pupils are equal, round, and reactive to light.   Cardiovascular:      Rate and Rhythm: Normal rate and regular rhythm.   Pulmonary:      Effort: Pulmonary effort is normal. No respiratory distress.   Abdominal:      General: There is no distension.      Palpations: Abdomen is soft.   Musculoskeletal:         General: Deformity (BL LE amputations) present.   Skin:     Coloration: Skin is not jaundiced.   Neurological:      General: No focal deficit present.      Mental Status: She is alert.         Significant Labs:  All labs within the past 24 hours have been reviewed.    Significant Imaging:  Labs:  Reviewed    Assessment/Plan:     ROSLYN (acute kidney injury)  -non oliguric likely ischemic ATN secondary to hypotension during RLE amputations  -BL sCr 1-1.3 with repeated prior ROSLYN  -BL U Dipst Prot + since 2015 with a1c > 13 since 2015  -admit creat 2.1  -UA 3+ prot, 6 RBC, 53 WBC  -UPCR 5.6 most likely secondary to diabetic kidney disease  -currently she has ROSLYN that is unresolved from admission and may be developing a new baseline  -not volume overloaded, no severe acidosis, no severe electrolyte abnormalities, no signs of uremia  -no need for diuresis or IV fluids  -no intervention from a nephrology standpoint    S/P BKA (below knee amputation) unilateral, right  -per primary          Rolando Rivas MD  Nephrology  [facility] Surg

## 2022-02-07 NOTE — PLAN OF CARE
Forwarded updated PT/OT notes, progress notes, and PMR consult to [facility], via [facility]. Also forwarded Facility Transfer orders.    [facility] to review patient this morning.    Tammy Will [facility] [place] [facility]  [phone]

## 2022-02-07 NOTE — SUBJECTIVE & OBJECTIVE
Past Medical History:   Diagnosis Date    Anxiety     Chronic pain syndrome     CKD (chronic kidney disease), stage III     Depression     Diabetes mellitus     type 2    Diabetes mellitus, type 2     GERD (gastroesophageal reflux disease)     Hyperemesis 3/23/2021    Hyperlipemia     Hypertension     Hypokalemia 3/23/2021    Myocardial infarction 2010    minor-caused by stress per pt.    Osteomyelitis     Osteomyelitis of left foot 4/30/2021    PVD (peripheral vascular disease)     Vaginal delivery     x1       Past Surgical History:   Procedure Laterality Date    ABOVE-KNEE AMPUTATION Left 5/18/2021    Procedure: AMPUTATION, ABOVE KNEE;  Surgeon: Teddy Huber MD;  Location: [facility] OR [address] [place] [address];  Service: Vascular;  Laterality: Left;    Angiogram - Right Extremity Right 7/9/15    angiogram-left leg  10/6/15    ANGIOGRAPHY OF LOWER EXTREMITY Left 4/29/2021    Procedure: ANGIOGRAM, LOWER EXTREMITY;  Surgeon: Teddy Huber MD;  Location: [facility] OR [address] [place] [address];  Service: Vascular;  Laterality: Left;    BELOW KNEE AMPUTATION OF LOWER EXTREMITY Right 12/28/2021    Procedure: AMPUTATION, BELOW KNEE;  Surgeon: Kaitlyn Rojas MD;  Location: [facility];  Service: General;  Laterality: Right;    CATHETERIZATION OF BOTH LEFT AND RIGHT HEART N/A 12/18/2019    Procedure: CATHETERIZATION, HEART, BOTH LEFT AND RIGHT;  Surgeon: Que Fernando III, MD;  Location: [facility] CATH LAB;  Service: Cardiology;  Laterality: N/A;    CORONARY ANGIOGRAPHY N/A 12/18/2019    Procedure: ANGIOGRAM, CORONARY ARTERY;  Surgeon: Que Fernando III, MD;  Location: [facility] CATH LAB;  Service: Cardiology;  Laterality: N/A;    CORONARY ANGIOGRAPHY INCLUDING BYPASS GRAFTS WITH CATHETERIZATION OF LEFT HEART N/A 7/28/2020    Procedure: ANGIOGRAM, CORONARY, INCLUDING BYPASS GRAFT, WITH LEFT HEART CATHETERIZATION, 9 am;  Surgeon: Rachel Easley MD;  Location: [facility] CATH LAB;  Service: Cardiology;  Laterality: N/A;    CORONARY  ARTERY BYPASS GRAFT (CABG) N/A 1/14/2020    Procedure: CORONARY ARTERY BYPASS GRAFT (CABG) x 1     Off Pump;  Surgeon: Huang Altamirano MD;  Location: [facility] OR [address] [place] [address];  Service: Cardiovascular;  Laterality: N/A;    CREATION OF FEMORAL-TIBIAL ARTERY BYPASS Left 4/29/2021    Procedure: CREATION, BYPASS, ARTERIAL, FEMORAL TO ANTERIOR TIBIAL;  Surgeon: Teddy Huber MD;  [facility];  Service: Vascular;  Laterality: Left;    CREATION OF FEMOROPOPLITEAL ARTERIAL BYPASS USING GRAFT Left 8/18/2020    Procedure: CREATION, BYPASS, ARTERIAL, FEMORAL TO POPLITEAL, USING GRAFT, LEFT LOWER EXTREMITY;  Surgeon: Teddy Huber MD;  Location: [facility] OR;  Service: Vascular;  Laterality: Left;  REQUEST 7:15 A.M. START----COVID NEGATIVE ON 8/17 1ST CASE STARTKENYA DUEÑAS ON 8/7/2020 @ 942AM-  RN PREOP 8/12/2020   T/S-----CLEARED BY CARDS-------PENDING INSURANCE    DEBRIDEMENT OF FOOT Left 9/8/2020    Procedure: DEBRIDEMENT, FOOT;  Surgeon: Rosio Mayes DPM;  Location: [facility] OR;  Service: Podiatry;  Laterality: Left;  request neoxx .   RN Pre Op 9-4-2020, Covid negative on 9/5/20. C A    DEBRIDEMENT OF FOOT  3/4/2021    Procedure: DEBRIDEMENT, FOOT;  Surgeon: Teddy Huber MD;  Location: [facility] OR;  Service: Vascular;;    DEBRIDEMENT OF FOOT Left 3/9/2021    Procedure: DEBRIDEMENT, FOOT, bone biopsy;  Surgeon: Rosio Mayes DPM;  Location: [facility] OR;  Service: Podiatry;  Laterality: Left;  Request neoxx---COVID IN AM  REQUESTING NOON START  RN Phone Pre op.On Blood thinners Plavix and Eliquis.  Covid am of surgery. C A    DEBRIDEMENT OF FOOT Left 5/4/2021    Procedure: DEBRIDEMENT, FOOT;  Surgeon: Farooq Morley DPM;  [facility];  Service: Podiatry;  Laterality: Left;    INSERTION OF TUNNELED CENTRAL VENOUS HEMODIALYSIS CATHETER N/A 1/27/2020    Procedure: Insertion, Catheter, Central Venous, Hemodialysis;  Surgeon: ESTEBAN Gomez III, MD;  Location: [facility] CATH LAB;   Service: Peripheral Vascular;  Laterality: N/A;    PERCUTANEOUS TRANSLUMINAL ANGIOPLASTY N/A 3/4/2021    Procedure: PTA (ANGIOPLASTY, PERCUTANEOUS, TRANSLUMINAL);  Surgeon: Teddy Huber MD;  Location: [facility] OR;  Service: Vascular;  Laterality: N/A;    REMOVAL OF ARTERIOVENOUS GRAFT Left 5/27/2021    Procedure: REMOVAL, GRAFT, LEFT LOWER EXTREMITY, WOUND EXPLORATION;  Surgeon: Teddy Huber MD;  Location: [facility] OR 2ND FLR;  Service: Vascular;  Laterality: Left;    REMOVAL OF NAIL OF DIGIT Left 3/9/2021    Procedure: REMOVAL, NAIL, DIGIT;  Surgeon: Rosio Mayes DPM;  Location: [facility] OR;  Service: Podiatry;  Laterality: Left;    THROMBECTOMY Left 3/4/2021    Procedure: THROMBECTOMY, LEFT LOWER EXTREMITY BYPASS GRAFT, ANGIOGRAM, POSSIBLE INTERVENTION, POSSIBLE LEFT LOWER EXTREMITY BYPASS;  Surgeon: Teddy Huber MD;  Location: [facility] OR;  Service: Vascular;  Laterality: Left;    THROMBECTOMY Left 4/29/2021    Procedure: GRAFT THROMBECTOMY, LEFT LOWER EXTREMITY;  Surgeon: Teddy Huber MD;  Location: [facility] OR 2ND FLR;  Service: Vascular;  Laterality: Left;  14.5 min  1179.85 mGy  341.01 Gycm2  240 ml dye    THROMBECTOMY  10/22/2021    Procedure: THROMBECTOMY;  Surgeon: Saad Arenas MD;  Location: [facility] CATH LAB/EP;  Service: Cardiology;;       Review of patient's allergies indicates:   Allergen Reactions    Ciprofloxacin Itching    Contrast media      Kidney injury    Iodine      Kidney injury     Current Facility-Administered Medications   Medication Frequency    acetaminophen tablet 650 mg Q4H PRN    albuterol sulfate nebulizer solution 2.5 mg Q4H PRN    atorvastatin tablet 80 mg QHS    carvediloL tablet 3.125 mg BID    clopidogreL tablet 75 mg Daily    COVID-19 vac, aden(Pfizer)(PF) (Pfizer COVID-19) 30 mcg/0.3 mL injection 0.3 mL vaccine x 1 dose    dextrose 10% bolus 125 mL PRN    dextrose 10% bolus 250 mL PRN    glucagon (human recombinant) injection 1 mg PRN     glucose chewable tablet 16 g PRN    glucose chewable tablet 24 g PRN    hydrALAZINE tablet 25 mg Q8H PRN    HYDROcodone-acetaminophen 5-325 mg per tablet 1 tablet Q6H PRN    insulin aspart U-100 pen 1-10 Units QID (AC + HS) PRN    insulin aspart U-100 pen 5 Units TIDWM    insulin detemir U-100 pen 10 Units QHS    lactated ringers bolus 1,000 mL Once    melatonin tablet 6 mg Nightly PRN    naloxone 0.4 mg/mL injection 0.02 mg PRN    ondansetron disintegrating tablet 8 mg Q8H PRN    polyethylene glycol packet 17 g Daily    rivaroxaban tablet 2.5 mg BID WM    senna tablet 8.6 mg Daily    sertraline tablet 50 mg Daily    sodium chloride 0.9% flush 10 mL Q12H PRN     Family History     Problem Relation (Age of Onset)    Diabetes Mother, Father, Paternal Grandmother    Heart disease Maternal Grandmother    No Known Problems Maternal Grandfather, Paternal Grandfather        Tobacco Use    Smoking status: Former Smoker    Smokeless tobacco: Never Used   Substance and Sexual Activity    Alcohol use: No    Drug use: Yes     Types: Marijuana     Comment: occassional    Sexual activity: Yes     Partners: Male     Review of Systems   Constitutional: Negative.    HENT: Negative.    Eyes: Negative.    Respiratory: Negative.    Cardiovascular: Negative.    Gastrointestinal: Negative.    Endocrine: Negative.    Genitourinary: Negative.    Musculoskeletal: Negative.    Skin: Negative.    Neurological: Negative.    Psychiatric/Behavioral: Positive for confusion.     Objective:     Vital Signs (Most Recent):  Temp: 97.3 °F (36.3 °C) (02/07/22 1149)  Pulse: 90 (02/07/22 1149)  Resp: 16 (02/07/22 1149)  BP: 117/66 (02/07/22 1149)  SpO2: 97 % (02/07/22 1251)  O2 Device (Oxygen Therapy): room air (02/07/22 1251) Vital Signs (24h Range):  Temp:  [97.1 °F (36.2 °C)-98.5 °F (36.9 °C)] 97.3 °F (36.3 °C)  Pulse:  [80-99] 90  Resp:  [14-18] 16  SpO2:  [92 %-97 %] 97 %  BP: (106-152)/(57-82) 117/66     Weight: 67.6 kg (149 lb  0.5 oz) (02/01/22 1608)  Body mass index is 24.8 kg/m².  Body surface area is 1.76 meters squared.    I/O last 3 completed shifts:  In: 1460 [P.O.:960; I.V.:400; IV Piggyback:100]  Out: -     Physical Exam  Constitutional:       General: She is not in acute distress.     Appearance: She is obese.   HENT:      Mouth/Throat:      Mouth: Mucous membranes are moist.   Eyes:      General: No scleral icterus.     Extraocular Movements: Extraocular movements intact.      Pupils: Pupils are equal, round, and reactive to light.   Cardiovascular:      Rate and Rhythm: Normal rate and regular rhythm.   Pulmonary:      Effort: Pulmonary effort is normal. No respiratory distress.   Abdominal:      General: There is no distension.      Palpations: Abdomen is soft.   Musculoskeletal:         General: Deformity (BL LE amputations) present.   Skin:     Coloration: Skin is not jaundiced.   Neurological:      General: No focal deficit present.      Mental Status: She is alert.         Significant Labs:  All labs within the past 24 hours have been reviewed.    Significant Imaging:  Labs: Reviewed

## 2022-02-07 NOTE — CONSULTS
[facility] - Med Surg  Skin Integrity AVA  Consult Note    Patient Name: Suyapa Connelly  MRN: [id number]  Admission Date: 1/31/2022  Hospital Length of Stay: 6 days  Attending Physician: Flavia Delgado MD  Primary Care Provider: Magen Christensen MD     Consults  Subjective:     History of Present Illness:  Suyapa Connelly is a 56 year old female with PMHx of CKD3, DMII, GERD, HLD, HTN, osteomyelitis, PVD, depression, and anxiety presents to ED with  for concerns of increased confusion from her rehab facility.  at bedside is concerned for progressively worsening appearance of RLE wound, along with continued drainage, and worsening mental status of his wife which has progressed through this week. Wound care consulted for evaluation of sacral wound.       Scheduled Meds:   atorvastatin  80 mg Oral QHS    carvediloL  3.125 mg Oral BID    clopidogreL  75 mg Oral Daily    insulin aspart U-100  5 Units Subcutaneous TIDWM    insulin detemir U-100  10 Units Subcutaneous QHS    lactated ringers  1,000 mL Intravenous Once    polyethylene glycol  17 g Oral Daily    rivaroxaban  2.5 mg Oral BID WM    senna  8.6 mg Oral Daily    sertraline  50 mg Oral Daily     Continuous Infusions:  PRN Meds:acetaminophen, albuterol sulfate, sars-cov-2 (covid-19), dextrose 10%, dextrose 10%, glucagon (human recombinant), glucose, glucose, hydrALAZINE, HYDROcodone-acetaminophen, insulin aspart U-100, melatonin, naloxone, ondansetron, sodium chloride 0.9%    Review of patient's allergies indicates:   Allergen Reactions    Ciprofloxacin Itching    Contrast media      Kidney injury    Iodine      Kidney injury        Past Medical History:   Diagnosis Date    Anxiety     Chronic pain syndrome     CKD (chronic kidney disease), stage III     Depression     Diabetes mellitus     type 2    Diabetes mellitus, type 2     GERD (gastroesophageal reflux disease)     Hyperemesis 3/23/2021    Hyperlipemia     Hypertension      Hypokalemia 3/23/2021    Myocardial infarction 2010    minor-caused by stress per pt.    Osteomyelitis     Osteomyelitis of left foot 4/30/2021    PVD (peripheral vascular disease)     Vaginal delivery     x1     Past Surgical History:   Procedure Laterality Date    ABOVE-KNEE AMPUTATION Left 5/18/2021    Procedure: AMPUTATION, ABOVE KNEE;  Surgeon: Teddy Huber MD;  Location: [address];  Service: Vascular;  Laterality: Left;    Angiogram - Right Extremity Right 7/9/15    angiogram-left leg  10/6/15    ANGIOGRAPHY OF LOWER EXTREMITY Left 4/29/2021    Procedure: ANGIOGRAM, LOWER EXTREMITY;  Surgeon: Teddy Huber MD;  Location: [address];  Service: Vascular;  Laterality: Left;    BELOW KNEE AMPUTATION OF LOWER EXTREMITY Right 12/28/2021    Procedure: AMPUTATION, BELOW KNEE;  Surgeon: Kaitlyn Rojas MD;  Location: [facility];  Service: General;  Laterality: Right;    CATHETERIZATION OF BOTH LEFT AND RIGHT HEART N/A 12/18/2019    Procedure: CATHETERIZATION, HEART, BOTH LEFT AND RIGHT;  Surgeon: Que Fernando III, MD;  Location: [facility] CATH LAB;  Service: Cardiology;  Laterality: N/A;    CORONARY ANGIOGRAPHY N/A 12/18/2019    Procedure: ANGIOGRAM, CORONARY ARTERY;  Surgeon: Que Fernando III, MD;  Location: [facility] CATH LAB;  Service: Cardiology;  Laterality: N/A;    CORONARY ANGIOGRAPHY INCLUDING BYPASS GRAFTS WITH CATHETERIZATION OF LEFT HEART N/A 7/28/2020    Procedure: ANGIOGRAM, CORONARY, INCLUDING BYPASS GRAFT, WITH LEFT HEART CATHETERIZATION, 9 am;  Surgeon: Rachel Easley MD;  Location: [facility] CATH LAB;  Service: Cardiology;  Laterality: N/A;    CORONARY ARTERY BYPASS GRAFT (CABG) N/A 1/14/2020    Procedure: CORONARY ARTERY BYPASS GRAFT (CABG) x 1     Off Pump;  Surgeon: Huang Altamirano MD;  Location: [address];  Service: Cardiovascular;  Laterality: N/A;    CREATION OF FEMORAL-TIBIAL ARTERY BYPASS Left 4/29/2021    Procedure: CREATION, BYPASS,  ARTERIAL, FEMORAL TO ANTERIOR TIBIAL;  Surgeon: Teddy Huber MD;  [facility];  Service: Vascular;  Laterality: Left;    CREATION OF FEMOROPOPLITEAL ARTERIAL BYPASS USING GRAFT Left 8/18/2020    Procedure: CREATION, BYPASS, ARTERIAL, FEMORAL TO POPLITEAL, USING GRAFT, LEFT LOWER EXTREMITY;  Surgeon: Teddy Huber MD;  Location: [facility];  Service: Vascular;  Laterality: Left;  REQUEST 7:15 A.M. START----COVID NEGATIVE ON 8/17  1ST CASE STARTE PER LEANA ON 8/7/2020 @ 942AM-LO  RN PREOP 8/12/2020   T/S-----CLEARED BY CARDS-------PENDING INSURANCE    DEBRIDEMENT OF FOOT Left 9/8/2020    Procedure: DEBRIDEMENT, FOOT;  Surgeon: Rosio Mayes DPM;  Location: [facility];  Service: Podiatry;  Laterality: Left;  request neoxx .   RN Pre Op 9-4-2020, Covid negative on 9/5/20. C A    DEBRIDEMENT OF FOOT  3/4/2021    Procedure: DEBRIDEMENT, FOOT;  Surgeon: Teddy Huber MD;  Location: [facility];  Service: Vascular;;    DEBRIDEMENT OF FOOT Left 3/9/2021    Procedure: DEBRIDEMENT, FOOT, bone biopsy;  Surgeon: Rosio Mayes DPM;  Location: [facility] OR;  Service: Podiatry;  Laterality: Left;  Request neoxx---COVID IN AM  REQUESTING NOON START  RN Phone Pre op.On Blood thinners Plavix and Eliquis.  Covid am of surgery. C A    DEBRIDEMENT OF FOOT Left 5/4/2021    Procedure: DEBRIDEMENT, FOOT;  Surgeon: Farooq Morley DPM;  [facility];  Service: Podiatry;  Laterality: Left;    INSERTION OF TUNNELED CENTRAL VENOUS HEMODIALYSIS CATHETER N/A 1/27/2020    Procedure: Insertion, Catheter, Central Venous, Hemodialysis;  Surgeon: ESTEBAN Gomez III, MD;  Location: [facility] CATH LAB;  Service: Peripheral Vascular;  Laterality: N/A;    PERCUTANEOUS TRANSLUMINAL ANGIOPLASTY N/A 3/4/2021    Procedure: PTA (ANGIOPLASTY, PERCUTANEOUS, TRANSLUMINAL);  Surgeon: Teddy Huber MD;  Location: [facility] OR;  Service: Vascular;  Laterality: N/A;    REMOVAL OF ARTERIOVENOUS GRAFT Left 5/27/2021     Procedure: REMOVAL, GRAFT, LEFT LOWER EXTREMITY, WOUND EXPLORATION;  Surgeon: Teddy Huber MD;  Location: [facility] OR 2ND FLR;  Service: Vascular;  Laterality: Left;    REMOVAL OF NAIL OF DIGIT Left 3/9/2021    Procedure: REMOVAL, NAIL, DIGIT;  Surgeon: Rosio Mayes DPM;  Location: [facility] OR;  Service: Podiatry;  Laterality: Left;    THROMBECTOMY Left 3/4/2021    Procedure: THROMBECTOMY, LEFT LOWER EXTREMITY BYPASS GRAFT, ANGIOGRAM, POSSIBLE INTERVENTION, POSSIBLE LEFT LOWER EXTREMITY BYPASS;  Surgeon: Teddy Huber MD;  Location: [facility] OR;  Service: Vascular;  Laterality: Left;    THROMBECTOMY Left 4/29/2021    Procedure: GRAFT THROMBECTOMY, LEFT LOWER EXTREMITY;  Surgeon: Teddy Huber MD;  Location: [facility] OR 2ND FLR;  Service: Vascular;  Laterality: Left;  14.5 min  1179.85 mGy  341.01 Gycm2  240 ml dye    THROMBECTOMY  10/22/2021    Procedure: THROMBECTOMY;  Surgeon: Saad Arenas MD;  Location: [facility] CATH LAB/EP;  Service: Cardiology;;       Family History     Problem Relation (Age of Onset)    Diabetes Mother, Father, Paternal Grandmother    Heart disease Maternal Grandmother    No Known Problems Maternal Grandfather, Paternal Grandfather        Tobacco Use    Smoking status: Former Smoker    Smokeless tobacco: Never Used   Substance and Sexual Activity    Alcohol use: No    Drug use: Yes     Types: Marijuana     Comment: occassional    Sexual activity: Yes     Partners: Male     Review of Systems   Skin: Positive for wound.       Objective:     Vital Signs (Most Recent):  Temp: 97.3 °F (36.3 °C) (02/07/22 1149)  Pulse: 90 (02/07/22 1149)  Resp: 16 (02/07/22 1149)  BP: 117/66 (02/07/22 1149)  SpO2: 97 % (02/07/22 1251) Vital Signs (24h Range):  Temp:  [97.1 °F (36.2 °C)-98.5 °F (36.9 °C)] 97.3 °F (36.3 °C)  Pulse:  [80-99] 90  Resp:  [14-18] 16  SpO2:  [92 %-98 %] 97 %  BP: (106-152)/(57-82) 117/66     Weight: 67.6 kg (149 lb 0.5 oz)  Body mass index is 24.8 kg/m².  Physical  Exam  Constitutional:       Appearance: Normal appearance.   Skin:     General: Skin is warm and dry.      Findings: Lesion present.   Neurological:      Mental Status: She is alert.         Laboratory:  All pertinent labs reviewed within the last 24 hours.    Diagnostic Results:  None      Assessment/Plan:         AVA Skin Integrity Evaluation    Skin Integrity AVA evaluation of patient as part of the comprehensive skin care team.   She has been admitted for 7 days. Skin injury was noted on 1/18/22. POA yes. PT and RD are involved in her care.               Dermatitis associated with moisture  - wound evaluation consistent with moisture associated dermatitis.  - wound previously documented as decubitus ulcer of sacral region, unstageable.  - pt is incontinent and wound appears to be related to moisture.  - continue Triad bid/prn cleaning.  - waffle overlay mattress in place. Inflated per RN at bedside.  - pt encouraged to turn q2h.  - nursing to maintain pressure injury prevention measures.         Thank you for your consult. I will follow-up with patient. Please contact us if you have any additional questions.         Magda Coleman NP  Skin Integrity AVA  [facility] - Med Surg

## 2022-02-07 NOTE — SUBJECTIVE & OBJECTIVE
Scheduled Meds:   atorvastatin  80 mg Oral QHS    carvediloL  3.125 mg Oral BID    clopidogreL  75 mg Oral Daily    insulin aspart U-100  5 Units Subcutaneous TIDWM    insulin detemir U-100  10 Units Subcutaneous QHS    lactated ringers  1,000 mL Intravenous Once    polyethylene glycol  17 g Oral Daily    rivaroxaban  2.5 mg Oral BID WM    senna  8.6 mg Oral Daily    sertraline  50 mg Oral Daily     Continuous Infusions:  PRN Meds:acetaminophen, albuterol sulfate, sars-cov-2 (covid-19), dextrose 10%, dextrose 10%, glucagon (human recombinant), glucose, glucose, hydrALAZINE, HYDROcodone-acetaminophen, insulin aspart U-100, melatonin, naloxone, ondansetron, sodium chloride 0.9%    Review of patient's allergies indicates:   Allergen Reactions    Ciprofloxacin Itching    Contrast media      Kidney injury    Iodine      Kidney injury        Past Medical History:   Diagnosis Date    Anxiety     Chronic pain syndrome     CKD (chronic kidney disease), stage III     Depression     Diabetes mellitus     type 2    Diabetes mellitus, type 2     GERD (gastroesophageal reflux disease)     Hyperemesis 3/23/2021    Hyperlipemia     Hypertension     Hypokalemia 3/23/2021    Myocardial infarction 2010    minor-caused by stress per pt.    Osteomyelitis     Osteomyelitis of left foot 4/30/2021    PVD (peripheral vascular disease)     Vaginal delivery     x1     Past Surgical History:   Procedure Laterality Date    ABOVE-KNEE AMPUTATION Left 5/18/2021    Procedure: AMPUTATION, ABOVE KNEE;  Surgeon: Teddy Huber MD;  Location: [facility] OR [address] [place] [address];  Service: Vascular;  Laterality: Left;    Angiogram - Right Extremity Right 7/9/15    angiogram-left leg  10/6/15    ANGIOGRAPHY OF LOWER EXTREMITY Left 4/29/2021    Procedure: ANGIOGRAM, LOWER EXTREMITY;  Surgeon: Teddy Huber MD;  Location: [facility] OR [address] [place] [address];  Service: Vascular;  Laterality: Left;    BELOW KNEE AMPUTATION OF LOWER EXTREMITY Right  12/28/2021    Procedure: AMPUTATION, BELOW KNEE;  Surgeon: Kaitlyn Rojas MD;  Location: [facility] OR;  Service: General;  Laterality: Right;    CATHETERIZATION OF BOTH LEFT AND RIGHT HEART N/A 12/18/2019    Procedure: CATHETERIZATION, HEART, BOTH LEFT AND RIGHT;  Surgeon: Que Fernando III, MD;  Location: [facility] CATH LAB;  Service: Cardiology;  Laterality: N/A;    CORONARY ANGIOGRAPHY N/A 12/18/2019    Procedure: ANGIOGRAM, CORONARY ARTERY;  Surgeon: Que Fernando III, MD;  Location: [facility] CATH LAB;  Service: Cardiology;  Laterality: N/A;    CORONARY ANGIOGRAPHY INCLUDING BYPASS GRAFTS WITH CATHETERIZATION OF LEFT HEART N/A 7/28/2020    Procedure: ANGIOGRAM, CORONARY, INCLUDING BYPASS GRAFT, WITH LEFT HEART CATHETERIZATION, 9 am;  Surgeon: Rachel Easley MD;  Location: [facility] CATH LAB;  Service: Cardiology;  Laterality: N/A;    CORONARY ARTERY BYPASS GRAFT (CABG) N/A 1/14/2020    Procedure: CORONARY ARTERY BYPASS GRAFT (CABG) x 1     Off Pump;  Surgeon: Huang Altamirano MD;  Location: [facility] OR [address] [place] [address];  Service: Cardiovascular;  Laterality: N/A;    CREATION OF FEMORAL-TIBIAL ARTERY BYPASS Left 4/29/2021    Procedure: CREATION, BYPASS, ARTERIAL, FEMORAL TO ANTERIOR TIBIAL;  Surgeon: Teddy Huber MD;  Location: [facility] OR [address] [place] [address];  Service: Vascular;  Laterality: Left;    CREATION OF FEMOROPOPLITEAL ARTERIAL BYPASS USING GRAFT Left 8/18/2020    Procedure: CREATION, BYPASS, ARTERIAL, FEMORAL TO POPLITEAL, USING GRAFT, LEFT LOWER EXTREMITY;  Surgeon: Teddy Huber MD;  Location: [facility];  Service: Vascular;  Laterality: Left;  REQUEST 7:15 A.M. START----COVID NEGATIVE ON 8/17  1ST CASE STARTE PER LEANA ON 8/7/2020 @ 942AM-LO  RN PREOP 8/12/2020   T/S-----CLEARED BY CARDS-------PENDING INSURANCE    DEBRIDEMENT OF FOOT Left 9/8/2020    Procedure: DEBRIDEMENT, FOOT;  Surgeon: Rosio Mayes DPM;  Location: [facility];  Service: Podiatry;  Laterality: Left;  request neoxx .   RN Pre Op 9-4-2020,  Covid negative on 9/5/20. C A    DEBRIDEMENT OF FOOT  3/4/2021    Procedure: DEBRIDEMENT, FOOT;  Surgeon: Teddy Huber MD;  Location: [facility] OR;  Service: Vascular;;    DEBRIDEMENT OF FOOT Left 3/9/2021    Procedure: DEBRIDEMENT, FOOT, bone biopsy;  Surgeon: Rosio Mayes DPM;  Location: [facility] OR;  Service: Podiatry;  Laterality: Left;  Request neoxx---COVID IN AM  REQUESTING NOON START  RN Phone Pre op.On Blood thinners Plavix and Eliquis.  Covid am of surgery. C A    DEBRIDEMENT OF FOOT Left 5/4/2021    Procedure: DEBRIDEMENT, FOOT;  Surgeon: Farooq Morley DPM;  Location: [facility] OR [address] [place] [address];  Service: Podiatry;  Laterality: Left;    INSERTION OF TUNNELED CENTRAL VENOUS HEMODIALYSIS CATHETER N/A 1/27/2020    Procedure: Insertion, Catheter, Central Venous, Hemodialysis;  Surgeon: ESTEBAN Gomez III, MD;  Location: [facility] CATH LAB;  Service: Peripheral Vascular;  Laterality: N/A;    PERCUTANEOUS TRANSLUMINAL ANGIOPLASTY N/A 3/4/2021    Procedure: PTA (ANGIOPLASTY, PERCUTANEOUS, TRANSLUMINAL);  Surgeon: Teddy Huber MD;  Location: [facility] OR;  Service: Vascular;  Laterality: N/A;    REMOVAL OF ARTERIOVENOUS GRAFT Left 5/27/2021    Procedure: REMOVAL, GRAFT, LEFT LOWER EXTREMITY, WOUND EXPLORATION;  Surgeon: Teddy Huber MD;  [facility];  Service: Vascular;  Laterality: Left;    REMOVAL OF NAIL OF DIGIT Left 3/9/2021    Procedure: REMOVAL, NAIL, DIGIT;  Surgeon: Rosio Mayes DPM;  Location: [facility] OR;  Service: Podiatry;  Laterality: Left;    THROMBECTOMY Left 3/4/2021    Procedure: THROMBECTOMY, LEFT LOWER EXTREMITY BYPASS GRAFT, ANGIOGRAM, POSSIBLE INTERVENTION, POSSIBLE LEFT LOWER EXTREMITY BYPASS;  Surgeon: Teddy Huber MD;  Location: [facility] OR;  Service: Vascular;  Laterality: Left;    THROMBECTOMY Left 4/29/2021    Procedure: GRAFT THROMBECTOMY, LEFT LOWER EXTREMITY;  Surgeon: Teddy Huber MD;  [facility];  Service: Vascular;   Laterality: Left;  14.5 min  1179.85 mGy  341.01 Gycm2  240 ml dye    THROMBECTOMY  10/22/2021    Procedure: THROMBECTOMY;  Surgeon: Saad Arenas MD;  Location: [facility] CATH LAB/EP;  Service: Cardiology;;       Family History     Problem Relation (Age of Onset)    Diabetes Mother, Father, Paternal Grandmother    Heart disease Maternal Grandmother    No Known Problems Maternal Grandfather, Paternal Grandfather        Tobacco Use    Smoking status: Former Smoker    Smokeless tobacco: Never Used   Substance and Sexual Activity    Alcohol use: No    Drug use: Yes     Types: Marijuana     Comment: occassional    Sexual activity: Yes     Partners: Male     Review of Systems   Skin: Positive for wound.       Objective:     Vital Signs (Most Recent):  Temp: 97.3 °F (36.3 °C) (02/07/22 1149)  Pulse: 90 (02/07/22 1149)  Resp: 16 (02/07/22 1149)  BP: 117/66 (02/07/22 1149)  SpO2: 97 % (02/07/22 1251) Vital Signs (24h Range):  Temp:  [97.1 °F (36.2 °C)-98.5 °F (36.9 °C)] 97.3 °F (36.3 °C)  Pulse:  [80-99] 90  Resp:  [14-18] 16  SpO2:  [92 %-98 %] 97 %  BP: (106-152)/(57-82) 117/66     Weight: 67.6 kg (149 lb 0.5 oz)  Body mass index is 24.8 kg/m².  Physical Exam  Constitutional:       Appearance: Normal appearance.   Skin:     General: Skin is warm and dry.      Findings: Lesion present.   Neurological:      Mental Status: She is alert.         Laboratory:  All pertinent labs reviewed within the last 24 hours.    Diagnostic Results:  None

## 2022-02-07 NOTE — PROGRESS NOTES
PM&R consult follow up.  Please see original consult for detailed note.      PM&R Recommendation:     At this time, the PM&R team has reviewed this patient's ongoing medical case including inpatient diagnosis, medical history, clinical examination, labs, vitals, current social and functional history to provide the post-acute recommendation as follows:     RECOMMENDATIONS: inpatient rehabilitation due to fair to good potential for improvement with therapies and need of physician oversight for management of ongoing active medical issues once medically stable.          We will continue to follow.    SAMMY Burton, [facility]-BC  Physical Medicine & Rehabilitation   02/07/2022

## 2022-02-07 NOTE — ASSESSMENT & PLAN NOTE
- wound evaluation consistent with moisture associated dermatitis.  - wound previously documented as decubitus ulcer of sacral region, unstageable.  - pt is incontinent and wound appears to be related to moisture.  - continue Triad bid/prn cleaning.  - waffle overlay mattress in place. Inflated per RN at bedside.  - pt encouraged to turn q2h.  - nursing to maintain pressure injury prevention measures.

## 2022-02-07 NOTE — ASSESSMENT & PLAN NOTE
-non oliguric likely ischemic ATN secondary to hypotension during RLE amputations  -BL sCr 1-1.3 with repeated prior ROSLYN  -BL U Dipst Prot + since 2015 with a1c > 13 since 2015  -admit creat 2.1  -UA 3+ prot, 6 RBC, 53 WBC  -UPCR 5.6 most likely secondary to diabetic kidney disease  -currently she has ROSLYN that is unresolved from admission and may be developing a new baseline  -not volume overloaded, no severe acidosis, no severe electrolyte abnormalities, no signs of uremia  -no need for diuresis or IV fluids  -no intervention from a nephrology standpoint

## 2022-02-07 NOTE — HPI
56 sherry jama female with DM2, CKD4, HTN, HLD, GERD, sacral decub, PVD s/p Bilateral AKA most recently right AKA on 12/28 with revision 1/24/22 and recent admission  for presumed surgical stump site infection on IV abx  at rehab who presented for acute encephalopathy and ROSLYN with concern for sepsis.    Nephrology consulted for ROSLYN

## 2022-02-08 LAB
ALBUMIN SERPL BCP-MCNC: 1.7 G/DL (ref 3.5–5.2)
ALP SERPL-CCNC: 109 U/L (ref 55–135)
ALT SERPL W/O P-5'-P-CCNC: 17 U/L (ref 10–44)
ANION GAP SERPL CALC-SCNC: 8 MMOL/L (ref 8–16)
AST SERPL-CCNC: 37 U/L (ref 10–40)
BASOPHILS # BLD AUTO: 0.05 K/UL (ref 0–0.2)
BASOPHILS NFR BLD: 0.5 % (ref 0–1.9)
BILIRUB SERPL-MCNC: 0.2 MG/DL (ref 0.1–1)
BUN SERPL-MCNC: 65 MG/DL (ref 6–20)
CALCIUM SERPL-MCNC: 9.2 MG/DL (ref 8.7–10.5)
CHLORIDE SERPL-SCNC: 106 MMOL/L (ref 95–110)
CO2 SERPL-SCNC: 24 MMOL/L (ref 23–29)
CREAT SERPL-MCNC: 1.7 MG/DL (ref 0.5–1.4)
DIFFERENTIAL METHOD: ABNORMAL
EOSINOPHIL # BLD AUTO: 0.4 K/UL (ref 0–0.5)
EOSINOPHIL NFR BLD: 4.3 % (ref 0–8)
ERYTHROCYTE [DISTWIDTH] IN BLOOD BY AUTOMATED COUNT: 15.9 % (ref 11.5–14.5)
EST. GFR  (AFRICAN AMERICAN): 38.6 ML/MIN/1.73 M^2
EST. GFR  (NON AFRICAN AMERICAN): 33.5 ML/MIN/1.73 M^2
GLUCOSE SERPL-MCNC: 97 MG/DL (ref 70–110)
HCT VFR BLD AUTO: 23.8 % (ref 37–48.5)
HGB BLD-MCNC: 7.5 G/DL (ref 12–16)
IMM GRANULOCYTES # BLD AUTO: 0.06 K/UL (ref 0–0.04)
IMM GRANULOCYTES NFR BLD AUTO: 0.6 % (ref 0–0.5)
LYMPHOCYTES # BLD AUTO: 1.6 K/UL (ref 1–4.8)
LYMPHOCYTES NFR BLD: 15.3 % (ref 18–48)
MAGNESIUM SERPL-MCNC: 2.4 MG/DL (ref 1.6–2.6)
MCH RBC QN AUTO: 30.1 PG (ref 27–31)
MCHC RBC AUTO-ENTMCNC: 31.5 G/DL (ref 32–36)
MCV RBC AUTO: 96 FL (ref 82–98)
MONOCYTES # BLD AUTO: 1.2 K/UL (ref 0.3–1)
MONOCYTES NFR BLD: 12.1 % (ref 4–15)
NEUTROPHILS # BLD AUTO: 6.9 K/UL (ref 1.8–7.7)
NEUTROPHILS NFR BLD: 67.2 % (ref 38–73)
NRBC BLD-RTO: 0 /100 WBC
PHOSPHATE SERPL-MCNC: 5.3 MG/DL (ref 2.7–4.5)
PLATELET # BLD AUTO: 368 K/UL (ref 150–450)
PMV BLD AUTO: 12 FL (ref 9.2–12.9)
POCT GLUCOSE: 102 MG/DL (ref 70–110)
POCT GLUCOSE: 115 MG/DL (ref 70–110)
POCT GLUCOSE: 125 MG/DL (ref 70–110)
POCT GLUCOSE: 155 MG/DL (ref 70–110)
POTASSIUM SERPL-SCNC: 4.7 MMOL/L (ref 3.5–5.1)
PROT SERPL-MCNC: 6.7 G/DL (ref 6–8.4)
RBC # BLD AUTO: 2.49 M/UL (ref 4–5.4)
SODIUM SERPL-SCNC: 138 MMOL/L (ref 136–145)
WBC # BLD AUTO: 10.22 K/UL (ref 3.9–12.7)

## 2022-02-08 PROCEDURE — 25000003 PHARM REV CODE 250: Mod: HCNC

## 2022-02-08 PROCEDURE — 85025 COMPLETE CBC W/AUTO DIFF WBC: CPT | Mod: HCNC | Performed by: STUDENT IN AN ORGANIZED HEALTH CARE EDUCATION/TRAINING PROGRAM

## 2022-02-08 PROCEDURE — 27000207 HC ISOLATION: Mod: HCNC

## 2022-02-08 PROCEDURE — 97530 THERAPEUTIC ACTIVITIES: CPT | Mod: HCNC,CQ

## 2022-02-08 PROCEDURE — 83735 ASSAY OF MAGNESIUM: CPT | Mod: HCNC | Performed by: STUDENT IN AN ORGANIZED HEALTH CARE EDUCATION/TRAINING PROGRAM

## 2022-02-08 PROCEDURE — 36415 COLL VENOUS BLD VENIPUNCTURE: CPT | Mod: HCNC | Performed by: STUDENT IN AN ORGANIZED HEALTH CARE EDUCATION/TRAINING PROGRAM

## 2022-02-08 PROCEDURE — 1111F DSCHRG MED/CURRENT MED MERGE: CPT | Mod: HCNC,CPTII,, | Performed by: HOSPITALIST

## 2022-02-08 PROCEDURE — 99232 SBSQ HOSP IP/OBS MODERATE 35: CPT | Mod: HCNC,,, | Performed by: INTERNAL MEDICINE

## 2022-02-08 PROCEDURE — 25000003 PHARM REV CODE 250: Mod: HCNC | Performed by: STUDENT IN AN ORGANIZED HEALTH CARE EDUCATION/TRAINING PROGRAM

## 2022-02-08 PROCEDURE — 1111F PR DISCHARGE MEDS RECONCILED W/ CURRENT OUTPATIENT MED LIST: ICD-10-PCS | Mod: HCNC,CPTII,, | Performed by: HOSPITALIST

## 2022-02-08 PROCEDURE — 84100 ASSAY OF PHOSPHORUS: CPT | Mod: HCNC | Performed by: STUDENT IN AN ORGANIZED HEALTH CARE EDUCATION/TRAINING PROGRAM

## 2022-02-08 PROCEDURE — 99239 PR HOSPITAL DISCHARGE DAY,>30 MIN: ICD-10-PCS | Mod: HCNC,,, | Performed by: HOSPITALIST

## 2022-02-08 PROCEDURE — 99232 PR SUBSEQUENT HOSPITAL CARE,LEVL II: ICD-10-PCS | Mod: HCNC,,, | Performed by: INTERNAL MEDICINE

## 2022-02-08 PROCEDURE — 80053 COMPREHEN METABOLIC PANEL: CPT | Mod: HCNC | Performed by: STUDENT IN AN ORGANIZED HEALTH CARE EDUCATION/TRAINING PROGRAM

## 2022-02-08 PROCEDURE — 99239 HOSP IP/OBS DSCHRG MGMT >30: CPT | Mod: HCNC,,, | Performed by: HOSPITALIST

## 2022-02-08 PROCEDURE — 11000001 HC ACUTE MED/SURG PRIVATE ROOM: Mod: HCNC

## 2022-02-08 RX ORDER — INSULIN ASPART 100 [IU]/ML
5 INJECTION, SOLUTION INTRAVENOUS; SUBCUTANEOUS
Status: CANCELLED | OUTPATIENT
Start: 2022-02-08

## 2022-02-08 RX ORDER — NALOXONE HCL 0.4 MG/ML
0.02 VIAL (ML) INJECTION
Status: CANCELLED | OUTPATIENT
Start: 2022-02-08

## 2022-02-08 RX ORDER — ATORVASTATIN CALCIUM 20 MG/1
80 TABLET, FILM COATED ORAL NIGHTLY
Status: CANCELLED | OUTPATIENT
Start: 2022-02-08

## 2022-02-08 RX ORDER — INSULIN ASPART 100 [IU]/ML
1-10 INJECTION, SOLUTION INTRAVENOUS; SUBCUTANEOUS
Status: CANCELLED | OUTPATIENT
Start: 2022-02-08

## 2022-02-08 RX ORDER — HYDROCODONE BITARTRATE AND ACETAMINOPHEN 5; 325 MG/1; MG/1
1 TABLET ORAL EVERY 6 HOURS PRN
Status: CANCELLED | OUTPATIENT
Start: 2022-02-08

## 2022-02-08 RX ORDER — ONDANSETRON 8 MG/1
8 TABLET, ORALLY DISINTEGRATING ORAL EVERY 8 HOURS PRN
Status: CANCELLED | OUTPATIENT
Start: 2022-02-08

## 2022-02-08 RX ORDER — SERTRALINE HYDROCHLORIDE 50 MG/1
50 TABLET, FILM COATED ORAL DAILY
Status: CANCELLED | OUTPATIENT
Start: 2022-02-09

## 2022-02-08 RX ORDER — SODIUM CHLORIDE 0.9 % (FLUSH) 0.9 %
10 SYRINGE (ML) INJECTION EVERY 12 HOURS PRN
Status: CANCELLED | OUTPATIENT
Start: 2022-02-08

## 2022-02-08 RX ORDER — IBUPROFEN 200 MG
16 TABLET ORAL
Status: CANCELLED | OUTPATIENT
Start: 2022-02-08

## 2022-02-08 RX ORDER — ACETAMINOPHEN 325 MG/1
650 TABLET ORAL EVERY 4 HOURS PRN
Status: CANCELLED | OUTPATIENT
Start: 2022-02-08

## 2022-02-08 RX ORDER — GLUCAGON 1 MG
1 KIT INJECTION
Status: CANCELLED | OUTPATIENT
Start: 2022-02-08

## 2022-02-08 RX ORDER — CLOPIDOGREL BISULFATE 75 MG/1
75 TABLET ORAL DAILY
Status: CANCELLED | OUTPATIENT
Start: 2022-02-09

## 2022-02-08 RX ORDER — CARVEDILOL 3.12 MG/1
3.12 TABLET ORAL 2 TIMES DAILY
Status: CANCELLED | OUTPATIENT
Start: 2022-02-08

## 2022-02-08 RX ORDER — TALC
6 POWDER (GRAM) TOPICAL NIGHTLY PRN
Status: CANCELLED | OUTPATIENT
Start: 2022-02-08

## 2022-02-08 RX ORDER — IBUPROFEN 200 MG
24 TABLET ORAL
Status: CANCELLED | OUTPATIENT
Start: 2022-02-08

## 2022-02-08 RX ORDER — ALBUTEROL SULFATE 2.5 MG/.5ML
2.5 SOLUTION RESPIRATORY (INHALATION) EVERY 4 HOURS PRN
Status: CANCELLED | OUTPATIENT
Start: 2022-02-08

## 2022-02-08 RX ORDER — SENNOSIDES 8.6 MG/1
8.6 TABLET ORAL DAILY
Status: CANCELLED | OUTPATIENT
Start: 2022-02-09

## 2022-02-08 RX ORDER — POLYETHYLENE GLYCOL 3350 17 G/17G
17 POWDER, FOR SOLUTION ORAL DAILY
Status: CANCELLED | OUTPATIENT
Start: 2022-02-09

## 2022-02-08 RX ADMIN — HYDROCODONE BITARTRATE AND ACETAMINOPHEN 1 TABLET: 5; 325 TABLET ORAL at 09:02

## 2022-02-08 RX ADMIN — RIVAROXABAN 2.5 MG: 2.5 TABLET, FILM COATED ORAL at 05:02

## 2022-02-08 RX ADMIN — SENNOSIDES 8.6 MG: 8.6 TABLET ORAL at 09:02

## 2022-02-08 RX ADMIN — RIVAROXABAN 2.5 MG: 2.5 TABLET, FILM COATED ORAL at 09:02

## 2022-02-08 RX ADMIN — CARVEDILOL 3.12 MG: 3.12 TABLET, FILM COATED ORAL at 09:02

## 2022-02-08 RX ADMIN — ATORVASTATIN CALCIUM 80 MG: 40 TABLET, FILM COATED ORAL at 09:02

## 2022-02-08 RX ADMIN — SERTRALINE HYDROCHLORIDE 50 MG: 50 TABLET ORAL at 09:02

## 2022-02-08 RX ADMIN — CLOPIDOGREL 75 MG: 75 TABLET, FILM COATED ORAL at 09:02

## 2022-02-08 NOTE — PROGRESS NOTES
"[facility] Medicine  Progress Note    Patient Name: Suyapa Connelly  MRN: [id number]  Patient Class: IP- Inpatient   Admission Date: 1/31/2022  Length of Stay: 6 days  Attending Physician: Flavia Delgado MD  Primary Care Provider: Magen Christensen MD        Subjective:     Principal Problem:Severe sepsis        HPI:  Suyapa Connelly is a 55 y/o female with PMHx CKD3, DMII, GERD, HLD, HTN, osteomyelitis, PVD, depression, and anxiety presents to ED with  for concerns of increased confusion from her rehab facility.  at bedside is concern for progressively worsening appearance of RLE wound, along with continued drainage, and worsening mental status of his wife which has progressed through this week. Patient emotionally labile and responds yes or no to most questions. She reports increased pain in her R leg with nausea over the past week. She also reports constipation without abdominal pain. She denies dysuria. Patient denies fevers, chills, vomiting, chest pain, cough, and shortness of breath.     Patient underwent right BKA by Dr. Rojas on 12/28 then discharged to rehab facility on 1/10. Since then she has recurrent hospital visits for the wound, requiring revisions and debridements; the last resulted in a pseudomonas positive wound culture resistant to cefepime 1/20. [facility] rehab RN stated that patient was "very confused this morning and more lethargic than normal." Per rehab, pt is normally AOx3 and able to follow commands. Rehab states they were concerned for sepsis due to elevated WBC and ROSLYN. Per , her last known normal was 1 weeks ago.    She was admitted to hospital medicine for severe sepsis.       Overview/Hospital Course:  Patient was admitted to hospital 1/31 for AMS and concern about worsening appearance of RLE wound. Patient was started on antibiotics (cefepime, vancomycin & azithromycin), blood, wound, resp & urine samples were collected for culture due to " concern for sepsis and ID and Gen Surg (s/p BKA on 12/28) were consulted. Pregabalin was withheld due to patient's myoclonus, likely 2/2 to ROSLYN leading to increased serum concentrations. Patient's mental status slowly improving throughout hospitalization, likely AMS due to both pregabalin intox and sepsis. ROSLYN resolved with IVF and abx. Increasing limb pain, pregabalin restarted at low dose. Azithromycin and vanc discontinued and continuing with meropenem. ID with final recs to continue through the 7th with meropenem, patient medically ready to return to rehab once Hgb stabilizes and after transfusion. Holding pregabalin and mirtazapine due to return of myoclonus. Improved cognition today. Nephro consulted for persistent ROSLYN. Recommend no intervention and consider possible new elevated baseline. Right BKA wound healing well. Will likely discharge to rehab.      Interval History: NAEON. Afebrile. VSS. Patient seen resting comfortably in bed with  at bedside. Reports good appetite and ready for rehab. Denies v/n/d/c, back/neck pain. Leg pain is well controlled. Patient is concerned that the oral morphine is contributing to ROSLYN, tried to educate patient that it is metabolized by the liver. She was skeptical still. Eating well and blood glucose is well controlled. Right knee wound is healing well with wound margins with granulation formation and pink borders without purulence. Patient denies n/v/d/c, CP, SOB.     Review of Systems   Constitutional: Negative for chills and fever.   HENT: Negative for congestion and rhinorrhea.    Eyes: Negative for pain and visual disturbance.   Respiratory: Negative for chest tightness and shortness of breath.    Cardiovascular: Negative for chest pain, palpitations and leg swelling.   Gastrointestinal: Negative for abdominal distention, abdominal pain, constipation, diarrhea, nausea and vomiting.   Genitourinary: Negative for difficulty urinating and dysuria.   Musculoskeletal:  Negative for arthralgias, back pain and neck pain.   Skin: Negative for rash and wound.   Neurological: Negative for dizziness and headaches.   Psychiatric/Behavioral: Negative for confusion and sleep disturbance.     Objective:     Vital Signs (Most Recent):  Temp: 97.6 °F (36.4 °C) (02/07/22 1558)  Pulse: 91 (02/07/22 1558)  Resp: 16 (02/07/22 1558)  BP: (!) 111/56 (02/07/22 1558)  SpO2: 96 % (02/07/22 1558) Vital Signs (24h Range):  Temp:  [97.1 °F (36.2 °C)-98.5 °F (36.9 °C)] 97.6 °F (36.4 °C)  Pulse:  [80-99] 91  Resp:  [14-18] 16  SpO2:  [92 %-97 %] 96 %  BP: (106-152)/(56-82) 111/56     Weight: 67.6 kg (149 lb 0.5 oz)  Body mass index is 24.8 kg/m².    Intake/Output Summary (Last 24 hours) at 2/7/2022 1807  Last data filed at 2/7/2022 0100  Gross per 24 hour   Intake 740 ml   Output --   Net 740 ml      Physical Exam  Vitals and nursing note reviewed.   Constitutional:       General: She is not in acute distress.     Appearance: Normal appearance. She is not ill-appearing or diaphoretic.   HENT:      Head: Normocephalic and atraumatic.      Right Ear: External ear normal.      Left Ear: External ear normal.      Nose: Nose normal. No congestion or rhinorrhea.      Mouth/Throat:      Mouth: Mucous membranes are moist.      Pharynx: Oropharynx is clear.   Eyes:      General: No scleral icterus.     Extraocular Movements: Extraocular movements intact.      Pupils: Pupils are equal, round, and reactive to light.   Cardiovascular:      Rate and Rhythm: Normal rate and regular rhythm.      Pulses: Normal pulses.      Heart sounds: Normal heart sounds.   Pulmonary:      Effort: Pulmonary effort is normal.      Breath sounds: Normal breath sounds. No wheezing, rhonchi or rales.   Abdominal:      General: Bowel sounds are normal. There is no distension.      Palpations: Abdomen is soft.      Tenderness: There is no abdominal tenderness. There is no right CVA tenderness or left CVA tenderness.   Musculoskeletal:          General: Normal range of motion.      Cervical back: Normal range of motion and neck supple. No tenderness.      Comments: R. BKA + L. AKA, R.BKA bandaged and can tolerate touch without pain.    Lymphadenopathy:      Cervical: No cervical adenopathy.   Skin:     General: Skin is warm and dry.   Neurological:      Mental Status: She is alert and oriented to person, place, and time.   Psychiatric:         Mood and Affect: Mood normal.         Behavior: Behavior normal.         Thought Content: Thought content normal.         Judgment: Judgment normal.         Significant Labs: All pertinent labs within the past 24 hours have been reviewed.    Significant Imaging: I have reviewed all pertinent imaging results/findings within the past 24 hours.      Assessment/Plan:      * Severe sepsis  Ms. Connelly is a 57 yo female s/p r BKA and hx of Pseudomonas infection at the site of surgery presenting with AMS, leukocytosis, and elevated procalc  - likely sepsis due to wound infection/OM  - ESR and CRP slightly elevated  - Procalc of 0.38  -MRI Sacrum and R leg without evidence of OM  - Gen Surg following given repeated wound infection s/p BKA, no surgical intervention, signing off  - blood cx x2, wound cx, resp cx NGTD  - urine cx NGTD to r/o other source     Plan  - wound care consulted  - Inflammatory markers elevated, confirming likely source is wound  - CBC daily to trend WCC  - Completed meropenem 2g q12 until 2/7 per ID recs        Dermatitis associated with moisture    Wound care following for sacral wound:  - wound evaluation consistent with moisture associated dermatitis.  - wound previously documented as decubitus ulcer of sacral region, unstageable.  - pt is incontinent and wound appears to be related to moisture.  - continue Triad bid/prn cleaning.  - waffle overlay mattress in place. Inflated per RN at bedside.  - pt encouraged to turn q2h.  - nursing to maintain pressure injury prevention measures.      Myoclonus  Patient has myoclonal jerks on exam  - likely in setting of pregabalin build with ROSLYN  - improving with holding meds    Plan  - Discontinue lyrica 50 mg BID;   - Discontinue mirtazipine  qhs  - follow up with PCP  - neuro checks  - delirium precautions      ROSLYN (acute kidney injury)  HPatient with acute kidney injury likely d/t IVVD/Dehydration Which is currently worsening. Labs reviewed- Renal function/electrolytes with Estimated Creatinine Clearance: 31.8 mL/min (A) (based on SCr of 1.8 mg/dL (H)). according to latest data. Monitor urine output and serial BMP and adjust therapy as needed. Avoid nephrotoxins and renally dose meds for GFR listed above.   - improved with IVF, likely pre renal    Plan  - strict I/Os  - renally dose meds  - avoid nephrotoxic agents  - discontinue mirtazipine and hold lyrica  - Nephro consulted, recs following:   -UA 3+ prot, 6 RBC, 53 WBC  -UPCR 5.6 most likely secondary to diabetic kidney disease  -currently she has ROSLYN that is unresolved from admission and may be developing a new baseline  -not volume overloaded, no severe acidosis, no severe electrolyte abnormalities, no signs of uremia  -no need for diuresis or IV fluids  -no intervention from a nephrology standpoint      Altered mental state  Likely in setting of infection  - CTH negative    Plan  - neuro checks  - delirium precautions      S/P BKA (below knee amputation) unilateral, right  Patient had recent BKA at [facility] with recent debridement last week  - patient has active drainage from wound with continued pain and complications    Plan  - Norco 5mg PRN q6 for severe pain  - Wound Care consulted, appreciate recs  - Gen Surg states no surgical intervention recommended at this time.   - ID recommend to continue with meropenem through 2/7, completed    Hypoalbuminemia  - Boost glucose control TIDWM   - Heme/onc outpatient referral    Impaired functional mobility and endurance  PT/OT consulted, recommend  returning to Rehab, awaiting acceptance    Chronic anemia    Chronic disease - MCV & MCH normal; denies acute blood loss, no indication for transfusion at this time.   Elevated TIBC, ferritin  Decreased transferrin    - Plan to transfuse if <7 with active bleeding    Type 2 diabetes mellitus with diabetic nephropathy, with long-term current use of insulin  Lab Results   Component Value Date    HGBA1C 9.6 (H) 12/03/2021     Plan  - Regular diet due to patient request  - MDSSI PRN with POCT glu TIDWM + QHS  - detemir 10 QD  - Aspart 5 TIDWM  - increase basal and add bolus regimen per requirements  - Consider reducing insulin dosing if Cr continues to climb due to possible poor insulin clearance      Acute blood loss anemia  Patient has Hgb of 6.6 on admission  Stable     Plan  - transfuse 1U pRBC  - Transfuse another 1u pRBC on 2/4.   - Anemia workup with iron, TIBC, b12 panel and FOBT. No apparent sources of bleeding.  - discontinue apixaban, and start xarelto 2.5mg BID + plavix 75 mg  - CBC daily, transfuse Hgb < 7      Mixed hyperlipidemia  Patient on atorvastatin 80mg at home    Plan  - continue home med     CAROLIN (generalized anxiety disorder)  Patient on melatonin, remeron, zoloft     Plan  - restart zoloft 50mg  - PRN melatonin  - Discontinue remeron 2/2 to Urinary retention and myoclonus,       Essential hypertension  History of hypertension, without home meds currently.    -restart carvedilol 3.125 bid   - hold home valsartan since ROSLYN  -Q4 vitals to monitor and titrate as needed    Moderate malnutrition  Boost TIDWM      VTE Risk Mitigation (From admission, onward)           Ordered     IP VTE HIGH RISK PATIENT  Once         01/31/22 1930                    Discharge Planning   DEVNA: 2/8/2022     Code Status: Prior   Is the patient medically ready for discharge?: Yes    Reason for patient still in hospital (select all that apply): Patient trending condition, Consult recommendations and Pending  disposition  Discharge Plan A: Rehab   Discharge Delays: None known at this time              Dominik Manley MD  Department of Hospital Medicine   [facility] Surg                      02/08/2022                             STAFF PHYSICIAN NOTE                                   Attending Attestation for Rounds with Resident  I have reviewed and concur with the resident's history, physical, assessment, and plan.  I have personally interviewed and examined the patient at bedside and agree with the resident's findings.                                  55 yo with hx of bilat BKA, T2DM, depression, neuropathic pain presenting with recurrent sepsis likely due to wound infection (stump vs sacral) and PNA.  Long discussion w pt and her  about her care and welfare. Finished meropenem 2/7. Acute urinary retention, ROSLYN on CKD4. Cr 1.5->1.7 -> 1.8, Nephro consulted. Post void bladder scan. This may be a new baseline.            Flavia Delgado MD  Senior Hospitalist  [id number], [phone]

## 2022-02-08 NOTE — ASSESSMENT & PLAN NOTE
Ms. Connelly is a 57 yo female s/p r BKA and hx of Pseudomonas infection at the site of surgery presenting with AMS, leukocytosis, and elevated procalc  - likely sepsis due to wound infection/OM  - ESR and CRP slightly elevated  - Procalc of 0.38  -MRI Sacrum and R leg without evidence of OM  - Gen Surg following given repeated wound infection s/p BKA, no surgical intervention, signing off  - blood cx x2, wound cx, resp cx NGTD  - urine cx NGTD to r/o other source     Plan  - wound care consulted  - Inflammatory markers elevated, confirming likely source is wound  - CBC daily to trend WCC  - Completed meropenem 2g q12 until 2/7 per ID recs

## 2022-02-08 NOTE — NURSING
"Care rounds completed. Pt sitting in bed in no acute distress. Pt had a large BM and required assistance with changing. Upon nurse entering the room pt family () was completing the change. When asked for assistance by nurse, nurse notes redness to pts vagina and attempted to apply barrier cream. per the pt family member " this is not what I want on her" and refused to allow the nurse to place the barrier cream for the redness noted on pt vagina. Nurse assisted family with placing a mepilex on pt sacrum for reduction of pressure injury. Pt bed in lowest positon. Call light and personal items are within reach. Will continue to monitor.     "

## 2022-02-08 NOTE — SUBJECTIVE & OBJECTIVE
Interval History: NAEON. Afebrile. VSS. Patient seen resting comfortably in bed with  at bedside. Reports good appetite and ready for rehab. Denies v/n/d/c, back/neck pain. Leg pain is well controlled. Patient is concerned that the oral morphine is contributing to ROSLYN, tried to educate patient that it is metabolized by the liver. She was skeptical still. Eating well and blood glucose is well controlled. Right knee wound is healing well with wound margins with granulation formation and pink borders without purulence. Patient denies n/v/d/c, CP, SOB.     Review of Systems   Constitutional: Negative for chills and fever.   HENT: Negative for congestion and rhinorrhea.    Eyes: Negative for pain and visual disturbance.   Respiratory: Negative for chest tightness and shortness of breath.    Cardiovascular: Negative for chest pain, palpitations and leg swelling.   Gastrointestinal: Negative for abdominal distention, abdominal pain, constipation, diarrhea, nausea and vomiting.   Genitourinary: Negative for difficulty urinating and dysuria.   Musculoskeletal: Negative for arthralgias, back pain and neck pain.   Skin: Negative for rash and wound.   Neurological: Negative for dizziness and headaches.   Psychiatric/Behavioral: Negative for confusion and sleep disturbance.     Objective:     Vital Signs (Most Recent):  Temp: 97.6 °F (36.4 °C) (02/07/22 1558)  Pulse: 91 (02/07/22 1558)  Resp: 16 (02/07/22 1558)  BP: (!) 111/56 (02/07/22 1558)  SpO2: 96 % (02/07/22 1558) Vital Signs (24h Range):  Temp:  [97.1 °F (36.2 °C)-98.5 °F (36.9 °C)] 97.6 °F (36.4 °C)  Pulse:  [80-99] 91  Resp:  [14-18] 16  SpO2:  [92 %-97 %] 96 %  BP: (106-152)/(56-82) 111/56     Weight: 67.6 kg (149 lb 0.5 oz)  Body mass index is 24.8 kg/m².    Intake/Output Summary (Last 24 hours) at 2/7/2022 1807  Last data filed at 2/7/2022 0100  Gross per 24 hour   Intake 740 ml   Output --   Net 740 ml      Physical Exam  Vitals and nursing note reviewed.    Constitutional:       General: She is not in acute distress.     Appearance: Normal appearance. She is not ill-appearing or diaphoretic.   HENT:      Head: Normocephalic and atraumatic.      Right Ear: External ear normal.      Left Ear: External ear normal.      Nose: Nose normal. No congestion or rhinorrhea.      Mouth/Throat:      Mouth: Mucous membranes are moist.      Pharynx: Oropharynx is clear.   Eyes:      General: No scleral icterus.     Extraocular Movements: Extraocular movements intact.      Pupils: Pupils are equal, round, and reactive to light.   Cardiovascular:      Rate and Rhythm: Normal rate and regular rhythm.      Pulses: Normal pulses.      Heart sounds: Normal heart sounds.   Pulmonary:      Effort: Pulmonary effort is normal.      Breath sounds: Normal breath sounds. No wheezing, rhonchi or rales.   Abdominal:      General: Bowel sounds are normal. There is no distension.      Palpations: Abdomen is soft.      Tenderness: There is no abdominal tenderness. There is no right CVA tenderness or left CVA tenderness.   Musculoskeletal:         General: Normal range of motion.      Cervical back: Normal range of motion and neck supple. No tenderness.      Comments: R. BKA + L. AKA, R.BKA bandaged and can tolerate touch without pain.    Lymphadenopathy:      Cervical: No cervical adenopathy.   Skin:     General: Skin is warm and dry.   Neurological:      Mental Status: She is alert and oriented to person, place, and time.   Psychiatric:         Mood and Affect: Mood normal.         Behavior: Behavior normal.         Thought Content: Thought content normal.         Judgment: Judgment normal.         Significant Labs: All pertinent labs within the past 24 hours have been reviewed.    Significant Imaging: I have reviewed all pertinent imaging results/findings within the past 24 hours.

## 2022-02-08 NOTE — ASSESSMENT & PLAN NOTE
Lab Results   Component Value Date    HGBA1C 9.6 (H) 12/03/2021     Plan  - Regular diet due to patient request  - MDSSI PRN with POCT glu TIDWM + QHS  - detemir 10 QD  - Aspart 5 TIDWM  - increase basal and add bolus regimen per requirements  - Consider reducing insulin dosing if Cr continues to climb due to possible poor insulin clearance

## 2022-02-08 NOTE — PT/OT/SLP PROGRESS
"Physical Therapy Treatment    Patient Name:  Suyapa Connelly   MRN:  [id number]    Recommendations:     Discharge Recommendations:  rehabilitation facility   Discharge Equipment Recommendations: none   Barriers to discharge: Decreased caregiver support    Assessment:     Suyapa Connelly is a 56 y.o. female admitted with a medical diagnosis of Severe sepsis.  She presents with the following impairments/functional limitations:  impaired self care skills,impaired functional mobilty.  Patient eager to sit up EOB and get off her back. She required Min a to transfer to EOB. Sat EOB with GOOD sitting balance dyn reaching performed and able to maintain Good sitting balance with Moderate challenges. Returned to supine with CGA/Min a.  Rolled left<>right  With CGA for Clean up as patient with soiled diaper Able to adjust self higher up in bed toward HOB with SBA. Will continue PT per POC.     Rehab Prognosis: Good; patient would benefit from acute skilled PT services to address these deficits and reach maximum level of function.    Recent Surgery: * No surgery found *      Plan:     During this hospitalization, patient to be seen 4 x/week to address the identified rehab impairments via therapeutic activities,therapeutic exercises and progress toward the following goals:    · Plan of Care Expires:  03/05/22    Subjective     Chief Complaint: none  Patient/Family Comments/goals: "I was sleeping well but I'm ready to sit up".   Pain/Comfort:  · Pain Rating 1: 0/10      Objective:     Communicated with Nurse prior to session.  Patient found supine with telemetry upon PT entry to room.     General Precautions: Standard, fall   Orthopedic Precautions:N/A   Braces: N/A  Respiratory Status: Room air     Functional Mobility:  · Bed Mobility:     · Rolling Left:  stand by assistance  · Rolling Right: stand by assistance  · Scooting: minimum assistance  · Supine to Sit: minimum assistance  · Sit to Supine: contact guard " assistance      AM-PAC 6 CLICK MOBILITY  Turning over in bed (including adjusting bedclothes, sheets and blankets)?: 3  Sitting down on and standing up from a chair with arms (e.g., wheelchair, bedside commode, etc.): 1  Moving from lying on back to sitting on the side of the bed?: 3  Moving to and from a bed to a chair (including a wheelchair)?: 3  Need to walk in hospital room?: 1  Climbing 3-5 steps with a railing?: 1  Basic Mobility Total Score: 12       Therapeutic Activities and Exercises:   -white board updated  -Educ patient on benefit of EOB sitting throughout the day when Her spouse is present to assist and supervise    Patient left supine with all lines intact, call button in reach and PCT present..    GOALS:   Multidisciplinary Problems     Physical Therapy Goals        Problem: Physical Therapy Goal    Goal Priority Disciplines Outcome Goal Variances Interventions   Physical Therapy Goal     PT, PT/OT Ongoing, Progressing     Description: Goals to be met by: 22    Patient will increase functional independence with mobility by performin. Supine to sit with Set-up [place]  2. Sit to supine with Set-up [place]  3. Patient will demonstrate independence with a home exercise program  4. Bed to chair transfer with Stand-by Assistance using No Assistive Device  5. Wheelchair propulsion x250 feet with Stand-by Assistance using bilateral uppper extremities                   Time Tracking:     PT Received On: 22  PT Start Time: 1553     PT Stop Time: 1620  PT Total Time (min): 27 min     Billable Minutes: Therapeutic Activity 27    Treatment Type: Treatment  PT/PTA: PTA     PTA Visit Number: 2     2022

## 2022-02-08 NOTE — ASSESSMENT & PLAN NOTE
-non oliguric likely ischemic ATN secondary to hypotension during RLE amputations  -BL sCr 1-1.3 with repeated prior ROSLYN  -BL U Dipst Prot + since 2015 with a1c > 13 since 2015  -admit creat 2.1  -UA 3+ prot, 6 RBC, 53 WBC  -UPCR 5.6 most likely secondary to diabetic kidney disease  -SPEP, SFLC, IF without paraproteinemia  -currently she has ROSLYN that is unresolved from admission and may be developing a new baseline  -not volume overloaded, no severe acidosis, no severe electrolyte abnormalities, no signs of uremia  -no need for diuresis or IV fluids  -no intervention from a nephrology standpoint

## 2022-02-08 NOTE — SUBJECTIVE & OBJECTIVE
Interval History: patient feels good today and said she was ready to go to rehab    Review of patient's allergies indicates:   Allergen Reactions    Ciprofloxacin Itching    Contrast media      Kidney injury    Iodine      Kidney injury     Current Facility-Administered Medications   Medication Frequency    acetaminophen tablet 650 mg Q4H PRN    albuterol sulfate nebulizer solution 2.5 mg Q4H PRN    atorvastatin tablet 80 mg QHS    carvediloL tablet 3.125 mg BID    clopidogreL tablet 75 mg Daily    COVID-19 vac, aden(Pfizer)(PF) (Pfizer COVID-19) 30 mcg/0.3 mL injection 0.3 mL vaccine x 1 dose    dextrose 10% bolus 125 mL PRN    dextrose 10% bolus 250 mL PRN    glucagon (human recombinant) injection 1 mg PRN    glucose chewable tablet 16 g PRN    glucose chewable tablet 24 g PRN    hydrALAZINE tablet 25 mg Q8H PRN    HYDROcodone-acetaminophen 5-325 mg per tablet 1 tablet Q6H PRN    insulin aspart U-100 pen 1-10 Units QID (AC + HS) PRN    insulin aspart U-100 pen 5 Units TIDWM    insulin detemir U-100 pen 10 Units QHS    lactated ringers bolus 1,000 mL Once    melatonin tablet 6 mg Nightly PRN    naloxone 0.4 mg/mL injection 0.02 mg PRN    ondansetron disintegrating tablet 8 mg Q8H PRN    polyethylene glycol packet 17 g Daily    rivaroxaban tablet 2.5 mg BID WM    senna tablet 8.6 mg Daily    sertraline tablet 50 mg Daily    sodium chloride 0.9% flush 10 mL Q12H PRN       Objective:     Vital Signs (Most Recent):  Temp: 96.7 °F (35.9 °C) (02/08/22 1623)  Pulse: 92 (02/08/22 1623)  Resp: 19 (02/08/22 1623)  BP: (!) 184/86 (02/08/22 1623)  SpO2: 99 % (02/08/22 1623)  O2 Device (Oxygen Therapy): room air (02/07/22 1251) Vital Signs (24h Range):  Temp:  [96.4 °F (35.8 °C)-98.6 °F (37 °C)] 96.7 °F (35.9 °C)  Pulse:  [] 92  Resp:  [14-19] 19  SpO2:  [95 %-99 %] 99 %  BP: (106-184)/(55-86) 184/86     Weight: 67.6 kg (149 lb 0.5 oz) (02/01/22 1608)  Body mass index is 24.8 kg/m².  Body  surface area is 1.76 meters squared.    I/O last 3 completed shifts:  In: 1580 [P.O.:1080; I.V.:400; IV Piggyback:100]  Out: -     Physical Exam  Constitutional:       General: She is not in acute distress.     Appearance: She is obese.   HENT:      Mouth/Throat:      Mouth: Mucous membranes are moist.   Eyes:      General: No scleral icterus.     Extraocular Movements: Extraocular movements intact.      Pupils: Pupils are equal, round, and reactive to light.   Cardiovascular:      Rate and Rhythm: Normal rate and regular rhythm.   Pulmonary:      Effort: Pulmonary effort is normal. No respiratory distress.   Abdominal:      General: There is no distension.      Palpations: Abdomen is soft.   Musculoskeletal:         General: Deformity (BL LE amputations) present.   Skin:     Coloration: Skin is not jaundiced.   Neurological:      General: No focal deficit present.      Mental Status: She is alert.         Significant Labs:  All labs within the past 24 hours have been reviewed.     Significant Imaging:  Labs: Reviewed

## 2022-02-08 NOTE — CONSULTS
[facility] Medicine  Telemedicine Consult Note    Patient Name: Suyapa Connelly  MRN: [id number]  Admission Date: 1/31/2022  Hospital Length of Stay: 7 days  Attending Physician: Flavia Delgado MD   Primary Care Provider: Magen Christensen MD     Thank you for your consult to [facility]. We have reviewed the patient chart. This patient does not meet criteria for [facility] service at this time due to patient discharged, DEVAN tomorrow if not today. Will hand back to In-house service..        Aurora Feliciano MD  Department of Hospital Medicine   [facility]

## 2022-02-08 NOTE — PROGRESS NOTES
[facility]  Nephrology  Progress Note    Patient Name: Suyapa Connelly  MRN: [id number]  Admission Date: 1/31/2022  Hospital Length of Stay: 7 days  Attending Provider: Flavia Delgado MD   Primary Care Physician: Magen Christensen MD  Principal Problem:Severe sepsis    Subjective:     HPI: 56 yea rold female with DM2, CKD4, HTN, HLD, GERD, sacral decub, PVD s/p Bilateral AKA most recently right AKA on 12/28 with revision 1/24/22 and recent admission  for presumed surgical stump site infection on IV abx  at rehab who presented for acute encephalopathy and ROSLYN with concern for sepsis.    Nephrology consulted for ROSLYN      Interval History: patient feels good today and said she was ready to go to rehab    Review of patient's allergies indicates:   Allergen Reactions    Ciprofloxacin Itching    Contrast media      Kidney injury    Iodine      Kidney injury     Current Facility-Administered Medications   Medication Frequency    acetaminophen tablet 650 mg Q4H PRN    albuterol sulfate nebulizer solution 2.5 mg Q4H PRN    atorvastatin tablet 80 mg QHS    carvediloL tablet 3.125 mg BID    clopidogreL tablet 75 mg Daily    COVID-19 vac, aden(Pfizer)(PF) (Pfizer COVID-19) 30 mcg/0.3 mL injection 0.3 mL vaccine x 1 dose    dextrose 10% bolus 125 mL PRN    dextrose 10% bolus 250 mL PRN    glucagon (human recombinant) injection 1 mg PRN    glucose chewable tablet 16 g PRN    glucose chewable tablet 24 g PRN    hydrALAZINE tablet 25 mg Q8H PRN    HYDROcodone-acetaminophen 5-325 mg per tablet 1 tablet Q6H PRN    insulin aspart U-100 pen 1-10 Units QID (AC + HS) PRN    insulin aspart U-100 pen 5 Units TIDWM    insulin detemir U-100 pen 10 Units QHS    lactated ringers bolus 1,000 mL Once    melatonin tablet 6 mg Nightly PRN    naloxone 0.4 mg/mL injection 0.02 mg PRN    ondansetron disintegrating tablet 8 mg Q8H PRN    polyethylene glycol packet 17 g Daily    rivaroxaban tablet 2.5 mg BID WM     senna tablet 8.6 mg Daily    sertraline tablet 50 mg Daily    sodium chloride 0.9% flush 10 mL Q12H PRN       Objective:     Vital Signs (Most Recent):  Temp: 96.7 °F (35.9 °C) (02/08/22 1623)  Pulse: 92 (02/08/22 1623)  Resp: 19 (02/08/22 1623)  BP: (!) 184/86 (02/08/22 1623)  SpO2: 99 % (02/08/22 1623)  O2 Device (Oxygen Therapy): room air (02/07/22 1251) Vital Signs (24h Range):  Temp:  [96.4 °F (35.8 °C)-98.6 °F (37 °C)] 96.7 °F (35.9 °C)  Pulse:  [] 92  Resp:  [14-19] 19  SpO2:  [95 %-99 %] 99 %  BP: (106-184)/(55-86) 184/86     Weight: 67.6 kg (149 lb 0.5 oz) (02/01/22 1608)  Body mass index is 24.8 kg/m².  Body surface area is 1.76 meters squared.    I/O last 3 completed shifts:  In: 1580 [P.O.:1080; I.V.:400; IV Piggyback:100]  Out: -     Physical Exam  Constitutional:       General: She is not in acute distress.     Appearance: She is obese.   HENT:      Mouth/Throat:      Mouth: Mucous membranes are moist.   Eyes:      General: No scleral icterus.     Extraocular Movements: Extraocular movements intact.      Pupils: Pupils are equal, round, and reactive to light.   Cardiovascular:      Rate and Rhythm: Normal rate and regular rhythm.   Pulmonary:      Effort: Pulmonary effort is normal. No respiratory distress.   Abdominal:      General: There is no distension.      Palpations: Abdomen is soft.   Musculoskeletal:         General: Deformity (BL LE amputations) present.   Skin:     Coloration: Skin is not jaundiced.   Neurological:      General: No focal deficit present.      Mental Status: She is alert.         Significant Labs:  All labs within the past 24 hours have been reviewed.     Significant Imaging:  Labs: Reviewed    Assessment/Plan:     ROSLYN (acute kidney injury)  -non oliguric likely ischemic ATN secondary to hypotension during RLE amputations  -BL sCr 1-1.3 with repeated prior ROSLYN  -BL U Dipst Prot + since 2015 with a1c > 13 since 2015  -admit creat 2.1  -UA 3+ prot, 6 RBC, 53 WBC  -UPCR  5.6 most likely secondary to diabetic kidney disease  -SPEP, SFLC, IF without paraproteinemia  -currently she has ROSLYN that is unresolved from admission and may be developing a new baseline  -not volume overloaded, no severe acidosis, no severe electrolyte abnormalities, no signs of uremia  -no need for diuresis or IV fluids  -no intervention from a nephrology standpoint    S/P BKA (below knee amputation) unilateral, right  -per primary    Amputation above knee-resolved as of 1/31/2022  -with revision  -per primary          Rolando Rivas MD  Nephrology  [facility]

## 2022-02-08 NOTE — DISCHARGE SUMMARY
"[facility] Medicine  Discharge Summary      Patient Name: Suyapa Connelly  MRN: [id number]  Patient Class: IP- Inpatient  Admission Date: 1/31/2022  Hospital Length of Stay: 7 days  Discharge Date and Time:  02/08/2022 3:18 PM  Attending Physician: Flavia Delgado MD   Discharging Provider: Tracy Blair MD  Primary Care Provider: Magen Christensen MD  [facility] Medicine Team: [facility] 2 Tracy Blair MD    HPI:   Suyapa Connelly is a 55 y/o female with PMHx CKD3, DMII, GERD, HLD, HTN, osteomyelitis, PVD, depression, and anxiety presents to ED with  for concerns of increased confusion from her rehab facility.  at bedside is concern for progressively worsening appearance of RLE wound, along with continued drainage, and worsening mental status of his wife which has progressed through this week. Patient emotionally labile and responds yes or no to most questions. She reports increased pain in her R leg with nausea over the past week. She also reports constipation without abdominal pain. She denies dysuria. Patient denies fevers, chills, vomiting, chest pain, cough, and shortness of breath.     Patient underwent right BKA by Dr. Rojas on 12/28 then discharged to rehab facility on 1/10. Since then she has recurrent hospital visits for the wound, requiring revisions and debridements; the last resulted in a pseudomonas positive wound culture resistant to cefepime 1/20. [facility] rehab RN stated that patient was "very confused this morning and more lethargic than normal." Per rehab, pt is normally AOx3 and able to follow commands. Rehab states they were concerned for sepsis due to elevated WBC and ROSLYN. Per , her last known normal was 1 weeks ago.    She was admitted to hospital medicine for severe sepsis.       * No surgery found *      Hospital Course:   Patient was admitted to hospital 1/31 for AMS and concern about worsening appearance of RLE wound. Patient was started on " antibiotics (cefepime, vancomycin & azithromycin), blood, wound, resp & urine samples were collected for culture due to concern for sepsis and ID and Gen Surg (s/p BKA on 12/28) were consulted. Pregabalin was withheld due to patient's myoclonus, likely 2/2 to ROSLYN leading to increased serum concentrations. Patient's mental status slowly improving throughout hospitalization, likely AMS due to both pregabalin intox and sepsis. Azithromycin and vanc discontinued and continuing with meropenem. ID with final recs to continue through the 7th with meropenem, completed during admission. Holding pregabalin and mirtazapine due to return of myoclonus and AMS. Patient can re address should pain worsen with PCP. Nephro consulted for persistent ROSLYN (Cr between 1.5-1.7), states this is new baseline for CKD. Right BKA wound healing well. Medically ready for discharge to rehab on 2/7.       Goals of Care Treatment Preferences:  Code Status: Full Code      Consults:   Consults (From admission, onward)        Status Ordering Provider     Inpatient consult to Nephrology  Once        Provider:  (Not yet assigned)    Completed RADHA TADEO     Inpatient consult to Physical Medicine Rehab  Once        Provider:  (Not yet assigned)    Completed RADHA TADEO     Inpatient consult to Registered Dietitian/Nutritionist  Once        Provider:  (Not yet assigned)    Completed RADHA TADEO     Inpatient consult to General Surgery  Once        Provider:  (Not yet assigned)    Completed RADHA TADEO     Inpatient consult to Infectious Diseases  Once        Provider:  (Not yet assigned)    Completed RADHA TADEO            Final Active Diagnoses:    Diagnosis Date Noted POA    PRINCIPAL PROBLEM:  Severe sepsis [A41.9, R65.20] 01/17/2022 Yes    Dermatitis associated with moisture [L30.8] 02/07/2022 Yes    Altered mental state [R41.82] 01/31/2022 Yes    ROSLYN (acute kidney injury) [N17.9] 01/31/2022 Yes    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] 01/31/2022 Yes     S/P BKA (below knee amputation) unilateral, right [Z89.511] 12/30/2021 Not Applicable    Hypoalbuminemia [E88.09] 06/19/2021 Yes    Impaired functional mobility and endurance [Z74.09] 05/06/2021 Yes    Chronic anemia [D64.9] 03/23/2021 Yes    Type 2 diabetes mellitus with diabetic nephropathy, with long-term current use of insulin [E11.21, Z79.4] 02/24/2021 Not Applicable    Acute blood loss anemia [D62] 01/27/2020 Yes    Mixed hyperlipidemia [E78.2] 12/13/2019 Yes     Chronic    CAROLIN (generalized anxiety disorder) [F41.1] 05/07/2018 Yes     Chronic    Essential hypertension [I10] 05/05/2018 Yes    Moderate malnutrition [E44.0] 05/05/2018 Yes      Problems Resolved During this Admission:    Diagnosis Date Noted Date Resolved POA    Amputation above knee [S78.119A] 06/08/2021 01/31/2022 Yes       Discharged Condition: good    Disposition: Rehab Facility    Follow Up:    Patient Instructions:      Ambulatory referral/consult to Hematology / Oncology   Standing Status: Future   Referral Priority: Routine Referral Type: Consultation   Referral Reason: Specialty Services Required   Requested Specialty: Hematology and Oncology     Ambulatory referral/consult to Nephrology   Standing Status: Future   Referral Priority: Routine Referral Type: Consultation   Referral Reason: Specialty Services Required   Requested Specialty: Nephrology   Number of Visits Requested: 1       Significant Diagnostic Studies: Labs:   CMP   Recent Labs   Lab 02/07/22  0607 02/07/22  0953 02/08/22  0415    135* 138   K 4.6 4.7 4.7    101 106   CO2 25 25 24   GLU 81 107 97   BUN 65* 62* 65*   CREATININE 1.8* 1.8* 1.7*   CALCIUM 9.4 9.4 9.2   PROT 6.7  --  6.7   ALBUMIN 1.7*  --  1.7*   BILITOT 0.2  --  0.2   ALKPHOS 83  --  109   AST 16  --  37   ALT 10  --  17   ANIONGAP 8 9 8   ESTGFRAFRICA 36.0* 36.0* 38.6*   EGFRNONAA 31.2* 31.2* 33.5*    and CBC   Recent Labs   Lab 02/07/22  0607 02/07/22  0607 02/07/22  0910 02/07/22  0910  02/08/22  0415   WBC 8.25  --  9.93  --  10.22   HGB 7.4*  --  8.7*  --  7.5*   HCT 23.8*   < > 28.4*   < > 23.8*     --  436  --  368    < > = values in this interval not displayed.     Microbiology:   Blood Culture   Lab Results   Component Value Date    LABBLOO No growth after 5 days. 01/31/2022    and Wound Culture: negative    Pending Diagnostic Studies:     None         Medications:  Reconciled Home Medications:      Medication List      START taking these medications    carvediloL 3.125 MG tablet  Commonly known as: COREG  Take 1 tablet (3.125 mg total) by mouth 2 (two) times daily.     ondansetron 8 MG Tbdl  Commonly known as: ZOFRAN-ODT  Dissolve 1 tablet (8 mg total) by mouth every 8 (eight) hours as needed.     XARELTO 2.5 mg Tab  Generic drug: rivaroxaban  Take 1 tablet (2.5 mg total) by mouth 2 (two) times daily with meals.        CHANGE how you take these medications    furosemide 40 MG tablet  Commonly known as: LASIX  Take 1 tablet (40 mg total) by mouth once daily. Please hold until follow up with PCP  What changed: additional instructions     sodium bicarbonate 650 MG tablet  Take 1 tablet (650 mg total) by mouth 2 (two) times daily. Please hold until follow up with Primary Care Provider  What changed: additional instructions        CONTINUE taking these medications    acetaminophen 500 MG tablet  Commonly known as: TYLENOL  Take 2 tablets (1,000 mg total) by mouth every 8 (eight) hours as needed for Pain.     allopurinoL 100 MG tablet  Commonly known as: ZYLOPRIM  Take 1 tablet (100 mg total) by mouth once daily.     atorvastatin 80 MG tablet  Commonly known as: LIPITOR  Take 1 tablet (80 mg total) by mouth every evening.     clopidogreL 75 mg tablet  Commonly known as: PLAVIX  Take 1 tablet (75 mg total) by mouth once daily.     DEXCOM G6  Misc  Generic drug: blood-glucose meter,continuous  Use to check blood sugars 4 times/day     DEXCOM G6 SENSOR Radha  Generic drug: blood-glucose  sensor  Apply one sensor to skin every 10 days     DEXCOM G6 TRANSMITTER Radha  Generic drug: blood-glucose transmitter  Replace transmitter every 3 months to use with dexcom sensor     HYDROcodone-acetaminophen 5-325 mg per tablet  Commonly known as: NORCO  Take 1 tablet by mouth every 4 (four) hours as needed for Pain.     insulin aspart U-100 100 unit/mL (3 mL) Inpn pen  Commonly known as: NovoLOG Flexpen U-100 Insulin  Inject 5 Units into the skin 3 (three) times daily with meals. If not eating, ok to hold     insulin detemir U-100 100 unit/mL (3 mL) Inpn pen  Commonly known as: Levemir FLEXTOUCH  Inject 12 Units into the skin every evening.     magnesium oxide 400 mg (241.3 mg magnesium) tablet  Commonly known as: MAG-OX  Take 1 tablet (400 mg total) by mouth 2 (two) times daily.     melatonin 3 mg tablet  Commonly known as: MELATIN  Take 2 tablets (6 mg total) by mouth nightly as needed for Insomnia.     mirtazapine 7.5 MG Tab  Commonly known as: REMERON  Take 2 tablets (15 mg total) by mouth nightly.     ONE-A-DAY WOMEN'S 50 PLUS ORAL  Take 1 tablet by mouth Daily.     oxyCODONE-acetaminophen  mg per tablet  Commonly known as: PERCOCET  Take 1 tablet by mouth every 6 (six) hours as needed.     sertraline 50 MG tablet  Commonly known as: ZOLOFT  Take 1 tablet (50 mg total) by mouth once daily.        STOP taking these medications    apixaban 5 mg Tab  Commonly known as: ELIQUIS     MEROPENEM 1 G/100 ML NS (READY TO MIX SYSTEM)     pregabalin 50 MG capsule  Commonly known as: LYRICA            Indwelling Lines/Drains at time of discharge:   Lines/Drains/Airways     Drain            Female External Urinary Catheter 01/31/22 1711 7 days                Time spent on the discharge of patient: 150 minutes         Tracy Blair MD  Department of Hospital Medicine  [facility]

## 2022-02-08 NOTE — ASSESSMENT & PLAN NOTE
Wound care following for sacral wound:  - wound evaluation consistent with moisture associated dermatitis.  - wound previously documented as decubitus ulcer of sacral region, unstageable.  - pt is incontinent and wound appears to be related to moisture.  - continue Triad bid/prn cleaning.  - waffle overlay mattress in place. Inflated per RN at bedside.  - pt encouraged to turn q2h.  - nursing to maintain pressure injury prevention measures.

## 2022-02-08 NOTE — PLAN OF CARE
02/08/22 1443   Discharge Reassessment   Assessment Type Discharge Planning Reassessment   Did the patient's condition or plan change since previous assessment? No   Discharge Plan discussed with: Patient   Communicated DEVAN with patient/caregiver Yes   Discharge Plan A Rehab   Discharge Plan B Rehab   DME Needed Upon Discharge  other (see comments)  (TBD)   Discharge Barriers Identified None   Why the patient remains in the hospital Requires continued medical care  (Waiting on re-acceptance from [facility])   Post-Acute Status   Post-Acute Authorization Placement   Post-Acute Placement Status Pending medical clearance/testing   Coverage [employer]   Discharge Delays None known at this time     Waiting to see if [facility] will [facility] patient back for in-patient services.    Tammy Will, [facility] [place]  [phone]

## 2022-02-08 NOTE — PROGRESS NOTES
"[facility] Medicine  Progress Note    Patient Name: Suyapa Connelly  MRN: [id number]  Patient Class: IP- Inpatient   Admission Date: 1/31/2022  Length of Stay: 7 days  Attending Physician: Flavia Delgado MD  Primary Care Provider: Magen Christensen MD        Subjective:     Principal Problem:Severe sepsis        HPI:  Suyapa Connelly is a 55 y/o female with PMHx CKD3, DMII, GERD, HLD, HTN, osteomyelitis, PVD, depression, and anxiety presents to ED with  for concerns of increased confusion from her rehab facility.  at bedside is concern for progressively worsening appearance of RLE wound, along with continued drainage, and worsening mental status of his wife which has progressed through this week. Patient emotionally labile and responds yes or no to most questions. She reports increased pain in her R leg with nausea over the past week. She also reports constipation without abdominal pain. She denies dysuria. Patient denies fevers, chills, vomiting, chest pain, cough, and shortness of breath.     Patient underwent right BKA by Dr. Rojas on 12/28 then discharged to rehab facility on 1/10. Since then she has recurrent hospital visits for the wound, requiring revisions and debridements; the last resulted in a pseudomonas positive wound culture resistant to cefepime 1/20. [facility] rehab RN stated that patient was "very confused this morning and more lethargic than normal." Per rehab, pt is normally AOx3 and able to follow commands. Rehab states they were concerned for sepsis due to elevated WBC and ROSLYN. Per , her last known normal was 1 weeks ago.    She was admitted to hospital medicine for severe sepsis.       Overview/Hospital Course:  Patient was admitted to hospital 1/31 for AMS and concern about worsening appearance of RLE wound. Patient was started on antibiotics (cefepime, vancomycin & azithromycin), blood, wound, resp & urine samples were collected for culture due to " concern for sepsis and ID and Gen Surg (s/p BKA on 12/28) were consulted. Pregabalin was withheld due to patient's myoclonus, likely 2/2 to ROSLYN leading to increased serum concentrations. Patient's mental status slowly improving throughout hospitalization, likely AMS due to both pregabalin intox and sepsis. Azithromycin and vanc discontinued and continuing with meropenem. ID with final recs to continue through the 7th with meropenem, completed during admission. Holding pregabalin and mirtazapine due to return of myoclonus and AMS. Patient can re address should pain worsen with PCP. Nephro consulted for persistent ROSLYN (Cr between 1.5-1.7), states this is new baseline for CKD. Right BKA wound healing well. Medically ready for discharge to rehab on 2/7.      Interval History: Symptoms:  Improved.   Diet:  Adequate intake.    Activity level:  Impaired due to weakness. Returning to normal.  Pain:  No pain.      Review of Systems   Constitutional: Negative for chills and fever.   HENT: Negative for congestion and rhinorrhea.    Eyes: Negative for pain and visual disturbance.   Respiratory: Negative for chest tightness and shortness of breath.    Cardiovascular: Negative for chest pain, palpitations and leg swelling.   Gastrointestinal: Negative for abdominal distention, abdominal pain, constipation, diarrhea, nausea and vomiting.   Genitourinary: Negative for difficulty urinating and dysuria.   Musculoskeletal: Negative for arthralgias, back pain and neck pain.   Skin: Positive for wound (improved). Negative for rash.   Neurological: Negative for dizziness and headaches.   Psychiatric/Behavioral: Negative for confusion and sleep disturbance.     Objective:     Vital Signs (Most Recent):  Temp: 96.4 °F (35.8 °C) (02/08/22 1214)  Pulse: 94 (02/08/22 1214)  Resp: 19 (02/08/22 1214)  BP: (!) 149/73 (02/08/22 1214)  SpO2: 96 % (02/08/22 1214) Vital Signs (24h Range):  Temp:  [96.4 °F (35.8 °C)-98.6 °F (37 °C)] 96.4 °F (35.8  °C)  Pulse:  [] 94  Resp:  [14-19] 19  SpO2:  [95 %-96 %] 96 %  BP: (106-169)/(55-79) 149/73     Weight: 67.6 kg (149 lb 0.5 oz)  Body mass index is 24.8 kg/m².    Intake/Output Summary (Last 24 hours) at 2/8/2022 1511  Last data filed at 2/7/2022 1800  Gross per 24 hour   Intake 360 ml   Output --   Net 360 ml      Physical Exam  Vitals and nursing note reviewed.   Constitutional:       General: She is not in acute distress.     Appearance: Normal appearance. She is not ill-appearing or diaphoretic.   HENT:      Head: Normocephalic and atraumatic.      Right Ear: External ear normal.      Left Ear: External ear normal.      Nose: Nose normal. No congestion or rhinorrhea.      Mouth/Throat:      Mouth: Mucous membranes are moist.      Pharynx: Oropharynx is clear.   Eyes:      General: No scleral icterus.     Extraocular Movements: Extraocular movements intact.      Pupils: Pupils are equal, round, and reactive to light.   Cardiovascular:      Rate and Rhythm: Normal rate and regular rhythm.      Pulses: Normal pulses.      Heart sounds: Normal heart sounds.   Pulmonary:      Effort: Pulmonary effort is normal.      Breath sounds: Normal breath sounds. No wheezing, rhonchi or rales.   Abdominal:      General: Bowel sounds are normal. There is no distension.      Palpations: Abdomen is soft.      Tenderness: There is no abdominal tenderness. There is no right CVA tenderness or left CVA tenderness.   Musculoskeletal:         General: Normal range of motion.      Cervical back: Normal range of motion and neck supple. No tenderness.      Comments: R. BKA + L. AKA, R.BKA bandaged and can tolerate touch without pain.    Lymphadenopathy:      Cervical: No cervical adenopathy.   Skin:     General: Skin is warm and dry.   Neurological:      Mental Status: She is alert and oriented to person, place, and time.   Psychiatric:         Mood and Affect: Mood normal.         Behavior: Behavior normal.         Thought Content:  Thought content normal.         Judgment: Judgment normal.         Significant Labs:   All pertinent labs within the past 24 hours have been reviewed.  CBC:   Recent Labs   Lab 02/07/22  0607 02/07/22  0910 02/08/22  0415   WBC 8.25 9.93 10.22   HGB 7.4* 8.7* 7.5*   HCT 23.8* 28.4* 23.8*    436 368     CMP:   Recent Labs   Lab 02/07/22  0607 02/07/22  0953 02/08/22  0415    135* 138   K 4.6 4.7 4.7    101 106   CO2 25 25 24   GLU 81 107 97   BUN 65* 62* 65*   CREATININE 1.8* 1.8* 1.7*   CALCIUM 9.4 9.4 9.2   PROT 6.7  --  6.7   ALBUMIN 1.7*  --  1.7*   BILITOT 0.2  --  0.2   ALKPHOS 83  --  109   AST 16  --  37   ALT 10  --  17   ANIONGAP 8 9 8   EGFRNONAA 31.2* 31.2* 33.5*     POCT Glucose:   Recent Labs   Lab 02/07/22 2037 02/08/22  0729 02/08/22  1212   POCTGLUCOSE 155* 102 155*       Significant Imaging: I have reviewed all pertinent imaging results/findings within the past 24 hours.           Assessment/Plan:      * Severe sepsis  Ms. Connelly is a 57 yo female s/p r BKA and hx of Pseudomonas infection at the site of surgery presenting with AMS, leukocytosis, and elevated procalc  - likely sepsis due to wound infection/OM  - ESR and CRP slightly elevated  - Procalc of 0.38  -MRI Sacrum and R leg without evidence of OM  - Gen Surg following given repeated wound infection s/p BKA, no surgical intervention, signing off  - blood cx x2, wound cx, resp cx NGTD  - urine cx NGTD to r/o other source     Plan  - wound care consulted, continue to appreciate recs  - Inflammatory markers elevated, confirming likely source is wound  - CBC daily to trend WCC  - Completed meropenem 2g q12 until 2/7 per ID recs        Dermatitis associated with moisture    Wound care following for sacral wound:  - wound evaluation consistent with moisture associated dermatitis.  - wound previously documented as decubitus ulcer of sacral region, unstageable.  - pt is incontinent and wound appears to be related to moisture.  -  continue Triad bid/prn cleaning.  - waffle overlay mattress in place. Inflated per RN at bedside.  - pt encouraged to turn q2h.  - nursing to maintain pressure injury prevention measures.     Myoclonus  Patient has myoclonal jerks on exam  - likely in setting of pregabalin build with ROSLYN  - improving with holding meds    Plan  - Discontinue lyrica 50 mg BID;   - Discontinue mirtazipine  qhs  - follow up with PCP  - neuro checks  - delirium precautions      ROSLYN (acute kidney injury)  HPatient with acute kidney injury likely d/t IVVD/Dehydration Which is currently worsening. Labs reviewed- Renal function/electrolytes with Estimated Creatinine Clearance: 33.6 mL/min (A) (based on SCr of 1.7 mg/dL (H)). according to latest data. Monitor urine output and serial BMP and adjust therapy as needed. Avoid nephrotoxins and renally dose meds for GFR listed above.   - improved with IVF, likely pre renal    Plan  - strict I/Os  - renally dose meds  - avoid nephrotoxic agents  - discontinue mirtazipine and hold lyrica  - Nephro consulted, recs following:   -UA 3+ prot, 6 RBC, 53 WBC  -UPCR 5.6 most likely secondary to diabetic kidney disease  - Cr 1.5-1.7, Nephrology consulted, states this is likely new baseline and to follow up with PCP  -not volume overloaded, no severe acidosis, no severe electrolyte abnormalities, no signs of uremia  -no need for diuresis or IV fluids  -no intervention from a nephrology standpoint      Altered mental state  Likely in setting of infection  - CTH negative    Plan  - neuro checks  - delirium precautions      S/P BKA (below knee amputation) unilateral, right  Patient had recent BKA at [facility] with recent debridement last week  - patient has active drainage from wound with continued pain and complications    Plan  - Norco 5mg PRN q6 for severe pain  - Wound Care consulted, continue to appreciate recs  - Gen Surg states no surgical intervention recommended at this time.   - ID recommend to continue  with meropenem through 2/7, completed    Hypoalbuminemia  - Boost glucose control TIDWM   - Heme/onc outpatient referral    Impaired functional mobility and endurance  PT/OT consulted, recommend returning to Rehab, awaiting acceptance    Chronic anemia    Chronic disease - MCV & MCH normal; denies acute blood loss, no indication for transfusion at this time.   Elevated TIBC, ferritin  Decreased transferrin    - Plan to transfuse if <7 with active bleeding    Type 2 diabetes mellitus with diabetic nephropathy, with long-term current use of insulin  Lab Results   Component Value Date    HGBA1C 9.6 (H) 12/03/2021     Plan  - Regular diet due to patient request  - MDSSI PRN with POCT glu TIDWM + QHS  - detemir 10 QD  - Aspart 5 TIDWM  - increase basal and add bolus regimen per requirements  - Consider reducing insulin dosing if Cr continues to climb due to possible poor insulin clearance      Acute blood loss anemia  Patient has Hgb of 6.6 on admission  Stable     Plan  - transfuse 1U pRBC  - Transfuse another 1u pRBC on 2/4.   - Anemia workup with iron, TIBC, b12 panel and FOBT. No apparent sources of bleeding.  - discontinue apixaban, and start xarelto 2.5mg BID + plavix 75 mg  - CBC daily, transfuse Hgb < 7      Mixed hyperlipidemia  Patient on atorvastatin 80mg at home    Plan  - continue home med     CAROLIN (generalized anxiety disorder)  Patient on melatonin, remeron, zoloft     Plan  - restart zoloft 50mg  - PRN melatonin  - Discontinue remeron 2/2 to Urinary retention and myoclonus,       Essential hypertension  History of hypertension, without home meds currently.    -restart carvedilol 3.125 bid   - hold home valsartan since ROSLYN, can resume once it is clear new baseline is 1.5-1.7, follow up with PCP  -Q4 vitals to monitor and titrate as needed    Moderate malnutrition  Boost TIDWM      VTE Risk Mitigation (From admission, onward)           Ordered     IP VTE HIGH RISK PATIENT  Once         01/31/22 1930                     Discharge Planning   DEVAN: 2/9/2022     Code Status: Prior   Is the patient medically ready for discharge?: Yes    Reason for patient still in hospital (select all that apply): Pending disposition  Discharge Plan A: Rehab   Discharge Delays: None known at this time      Tracy Blair MD  Department of Hospital Medicine   [facility]                      02/08/2022                             STAFF PHYSICIAN NOTE                                   Attending Attestation for Rounds with Resident  I have reviewed and concur with the resident's history, physical, assessment, and plan.  I have personally interviewed and examined the patient at bedside and agree with the resident's findings.                                       57 yo with hx of bilat BKA, T2DM, depression, neuropathic pain presenting with recurrent sepsis likely due to wound infection (stump vs sacral) and PNA.  Long discussion w pt and her  about her care and welfare. Finished meropenem 2/7. Wounds photographed. MRDC to Rehab.     Flavia Delgado MD  Senior Hospitalist  [id number], [phone]

## 2022-02-08 NOTE — SUBJECTIVE & OBJECTIVE
Interval History: Symptoms:  Improved.   Diet:  Adequate intake.    Activity level:  Impaired due to weakness. Returning to normal.  Pain:  No pain.      Review of Systems   Constitutional: Negative for chills and fever.   HENT: Negative for congestion and rhinorrhea.    Eyes: Negative for pain and visual disturbance.   Respiratory: Negative for chest tightness and shortness of breath.    Cardiovascular: Negative for chest pain, palpitations and leg swelling.   Gastrointestinal: Negative for abdominal distention, abdominal pain, constipation, diarrhea, nausea and vomiting.   Genitourinary: Negative for difficulty urinating and dysuria.   Musculoskeletal: Negative for arthralgias, back pain and neck pain.   Skin: Positive for wound (improved). Negative for rash.   Neurological: Negative for dizziness and headaches.   Psychiatric/Behavioral: Negative for confusion and sleep disturbance.     Objective:     Vital Signs (Most Recent):  Temp: 96.4 °F (35.8 °C) (02/08/22 1214)  Pulse: 94 (02/08/22 1214)  Resp: 19 (02/08/22 1214)  BP: (!) 149/73 (02/08/22 1214)  SpO2: 96 % (02/08/22 1214) Vital Signs (24h Range):  Temp:  [96.4 °F (35.8 °C)-98.6 °F (37 °C)] 96.4 °F (35.8 °C)  Pulse:  [] 94  Resp:  [14-19] 19  SpO2:  [95 %-96 %] 96 %  BP: (106-169)/(55-79) 149/73     Weight: 67.6 kg (149 lb 0.5 oz)  Body mass index is 24.8 kg/m².    Intake/Output Summary (Last 24 hours) at 2/8/2022 1511  Last data filed at 2/7/2022 1800  Gross per 24 hour   Intake 360 ml   Output --   Net 360 ml      Physical Exam  Vitals and nursing note reviewed.   Constitutional:       General: She is not in acute distress.     Appearance: Normal appearance. She is not ill-appearing or diaphoretic.   HENT:      Head: Normocephalic and atraumatic.      Right Ear: External ear normal.      Left Ear: External ear normal.      Nose: Nose normal. No congestion or rhinorrhea.      Mouth/Throat:      Mouth: Mucous membranes are moist.      Pharynx:  Oropharynx is clear.   Eyes:      General: No scleral icterus.     Extraocular Movements: Extraocular movements intact.      Pupils: Pupils are equal, round, and reactive to light.   Cardiovascular:      Rate and Rhythm: Normal rate and regular rhythm.      Pulses: Normal pulses.      Heart sounds: Normal heart sounds.   Pulmonary:      Effort: Pulmonary effort is normal.      Breath sounds: Normal breath sounds. No wheezing, rhonchi or rales.   Abdominal:      General: Bowel sounds are normal. There is no distension.      Palpations: Abdomen is soft.      Tenderness: There is no abdominal tenderness. There is no right CVA tenderness or left CVA tenderness.   Musculoskeletal:         General: Normal range of motion.      Cervical back: Normal range of motion and neck supple. No tenderness.      Comments: R. BKA + L. AKA, R.BKA bandaged and can tolerate touch without pain.    Lymphadenopathy:      Cervical: No cervical adenopathy.   Skin:     General: Skin is warm and dry.   Neurological:      Mental Status: She is alert and oriented to person, place, and time.   Psychiatric:         Mood and Affect: Mood normal.         Behavior: Behavior normal.         Thought Content: Thought content normal.         Judgment: Judgment normal.         Significant Labs:   All pertinent labs within the past 24 hours have been reviewed.  CBC:   Recent Labs   Lab 02/07/22  0607 02/07/22  0910 02/08/22  0415   WBC 8.25 9.93 10.22   HGB 7.4* 8.7* 7.5*   HCT 23.8* 28.4* 23.8*    436 368     CMP:   Recent Labs   Lab 02/07/22  0607 02/07/22  0953 02/08/22  0415    135* 138   K 4.6 4.7 4.7    101 106   CO2 25 25 24   GLU 81 107 97   BUN 65* 62* 65*   CREATININE 1.8* 1.8* 1.7*   CALCIUM 9.4 9.4 9.2   PROT 6.7  --  6.7   ALBUMIN 1.7*  --  1.7*   BILITOT 0.2  --  0.2   ALKPHOS 83  --  109   AST 16  --  37   ALT 10  --  17   ANIONGAP 8 9 8   EGFRNONAA 31.2* 31.2* 33.5*     POCT Glucose:   Recent Labs   Lab 02/07/22 2037  02/08/22  0729 02/08/22  1212   POCTGLUCOSE 155* 102 155*       Significant Imaging: I have reviewed all pertinent imaging results/findings within the past 24 hours.

## 2022-02-08 NOTE — ASSESSMENT & PLAN NOTE
HPatient with acute kidney injury likely d/t IVVD/Dehydration Which is currently worsening. Labs reviewed- Renal function/electrolytes with Estimated Creatinine Clearance: 31.8 mL/min (A) (based on SCr of 1.8 mg/dL (H)). according to latest data. Monitor urine output and serial BMP and adjust therapy as needed. Avoid nephrotoxins and renally dose meds for GFR listed above.   - improved with IVF, likely pre renal    Plan  - strict I/Os  - renally dose meds  - avoid nephrotoxic agents  - discontinue mirtazipine and hold lyrica  - Nephro consulted, recs following:   -UA 3+ prot, 6 RBC, 53 WBC  -UPCR 5.6 most likely secondary to diabetic kidney disease  -currently she has ROSLYN that is unresolved from admission and may be developing a new baseline  -not volume overloaded, no severe acidosis, no severe electrolyte abnormalities, no signs of uremia  -no need for diuresis or IV fluids  -no intervention from a nephrology standpoint

## 2022-02-08 NOTE — ASSESSMENT & PLAN NOTE
Ms. Connelly is a 57 yo female s/p r BKA and hx of Pseudomonas infection at the site of surgery presenting with AMS, leukocytosis, and elevated procalc  - likely sepsis due to wound infection/OM  - ESR and CRP slightly elevated  - Procalc of 0.38  -MRI Sacrum and R leg without evidence of OM  - Gen Surg following given repeated wound infection s/p BKA, no surgical intervention, signing off  - blood cx x2, wound cx, resp cx NGTD  - urine cx NGTD to r/o other source     Plan  - wound care consulted, continue to appreciate recs  - Inflammatory markers elevated, confirming likely source is wound  - CBC daily to trend WCC  - Completed meropenem 2g q12 until 2/7 per ID recs

## 2022-02-08 NOTE — ASSESSMENT & PLAN NOTE
HPatient with acute kidney injury likely d/t IVVD/Dehydration Which is currently worsening. Labs reviewed- Renal function/electrolytes with Estimated Creatinine Clearance: 33.6 mL/min (A) (based on SCr of 1.7 mg/dL (H)). according to latest data. Monitor urine output and serial BMP and adjust therapy as needed. Avoid nephrotoxins and renally dose meds for GFR listed above.   - improved with IVF, likely pre renal    Plan  - strict I/Os  - renally dose meds  - avoid nephrotoxic agents  - discontinue mirtazipine and hold lyrica  - Nephro consulted, recs following:   -UA 3+ prot, 6 RBC, 53 WBC  -UPCR 5.6 most likely secondary to diabetic kidney disease  - Cr 1.5-1.7, Nephrology consulted, states this is likely new baseline and to follow up with PCP  -not volume overloaded, no severe acidosis, no severe electrolyte abnormalities, no signs of uremia  -no need for diuresis or IV fluids  -no intervention from a nephrology standpoint

## 2022-02-08 NOTE — ASSESSMENT & PLAN NOTE
History of hypertension, without home meds currently.    -restart carvedilol 3.125 bid   - hold home valsartan since ROSLYN, can resume once it is clear new baseline is 1.5-1.7, follow up with PCP  -Q4 vitals to monitor and titrate as needed

## 2022-02-08 NOTE — PT/OT/SLP PROGRESS
Occupational Therapy      Patient Name:  Suyapa Connelly   MRN:  [id number]    Patient not seen today secondary to per chart, pt anticipating d/c from the hospital.  Worked with PT.   Will follow-up as scheduled per OT POC if pt remains in-house.    2/8/2022

## 2022-02-08 NOTE — ASSESSMENT & PLAN NOTE
Patient had recent BKA at [facility] with recent debridement last week  - patient has active drainage from wound with continued pain and complications    Plan  - Norco 5mg PRN q6 for severe pain  - Wound Care consulted, appreciate recs  - Gen Surg states no surgical intervention recommended at this time.   - ID recommend to continue with meropenem through 2/7, completed

## 2022-02-08 NOTE — ASSESSMENT & PLAN NOTE
Patient had recent BKA at [facility] with recent debridement last week  - patient has active drainage from wound with continued pain and complications    Plan  - Norco 5mg PRN q6 for severe pain  - Wound Care consulted, continue to appreciate recs  - Gen Surg states no surgical intervention recommended at this time.   - ID recommend to continue with meropenem through 2/7, completed

## 2022-02-08 NOTE — PLAN OF CARE
Pt is AAOx4,  at bedside. Pain control x1 and pt tolerated it. Bladder scan x 1, and pt voided spontaneously x2, and bm x1. No s/s of distress and POC reviewed with pt and family.   Problem: Diabetes Comorbidity  Goal: Blood Glucose Level Within Targeted Range  Outcome: Ongoing, Progressing     Problem: Oral Intake Inadequate (Acute Kidney Injury/Impairment)  Goal: Optimal Nutrition Intake  Outcome: Ongoing, Progressing     Problem: Infection  Goal: Absence of Infection Signs and Symptoms  Outcome: Ongoing, Progressing     Problem: Impaired Wound Healing  Goal: Optimal Wound Healing  Outcome: Ongoing, Progressing     Problem: Fall Injury Risk  Goal: Absence of Fall and Fall-Related Injury  Outcome: Ongoing, Progressing

## 2022-02-08 NOTE — NURSING
Pt in bed resting. No complaints of pain. Pt was expected to leave today but the creatinine goal of 1.5 has not been met. Explained plan of care to pt and pt family who was in the room and they verbalized understanding. Discussed pain management plan with patient and educated pt and family on side effects of analgesics used for pain management. Wound care performed on on sacral ulcer and right bka wound. Pt tolerated wound care well. Nurse noted brown formed stool upon check and change at 1500 and patient received a bed bath today. Pt denies any further questions or concerns at this time. IV fluids currently running to pt R sided midline. Pt settled and resting. Bed is in lowest and locked position will call light and personal belongings within reach.

## 2022-02-09 LAB
ALBUMIN SERPL BCP-MCNC: 1.7 G/DL (ref 3.5–5.2)
ALP SERPL-CCNC: 91 U/L (ref 55–135)
ALT SERPL W/O P-5'-P-CCNC: 15 U/L (ref 10–44)
ANION GAP SERPL CALC-SCNC: 8 MMOL/L (ref 8–16)
ANNOTATION COMMENT IMP: NORMAL
AST SERPL-CCNC: 28 U/L (ref 10–40)
BASOPHILS # BLD AUTO: 0.06 K/UL (ref 0–0.2)
BASOPHILS NFR BLD: 0.7 % (ref 0–1.9)
BILIRUB SERPL-MCNC: 0.3 MG/DL (ref 0.1–1)
BUN SERPL-MCNC: 61 MG/DL (ref 6–20)
CALCIUM SERPL-MCNC: 9.5 MG/DL (ref 8.7–10.5)
CHLORIDE SERPL-SCNC: 107 MMOL/L (ref 95–110)
CO2 SERPL-SCNC: 24 MMOL/L (ref 23–29)
CREAT SERPL-MCNC: 1.4 MG/DL (ref 0.5–1.4)
DIFFERENTIAL METHOD: ABNORMAL
EOSINOPHIL # BLD AUTO: 0.4 K/UL (ref 0–0.5)
EOSINOPHIL NFR BLD: 4.5 % (ref 0–8)
ERYTHROCYTE [DISTWIDTH] IN BLOOD BY AUTOMATED COUNT: 15.8 % (ref 11.5–14.5)
EST. GFR  (AFRICAN AMERICAN): 48.8 ML/MIN/1.73 M^2
EST. GFR  (NON AFRICAN AMERICAN): 42.3 ML/MIN/1.73 M^2
GASTRIN P FAST SERPL-MCNC: 36 PG/ML
GLUCOSE SERPL-MCNC: 112 MG/DL (ref 70–110)
HCT VFR BLD AUTO: 25 % (ref 37–48.5)
HGB BLD-MCNC: 7.6 G/DL (ref 12–16)
IF BLOCK AB SER QL: NEGATIVE
IMM GRANULOCYTES # BLD AUTO: 0.02 K/UL (ref 0–0.04)
IMM GRANULOCYTES NFR BLD AUTO: 0.2 % (ref 0–0.5)
LYMPHOCYTES # BLD AUTO: 1.5 K/UL (ref 1–4.8)
LYMPHOCYTES NFR BLD: 18 % (ref 18–48)
MAGNESIUM SERPL-MCNC: 2.3 MG/DL (ref 1.6–2.6)
MCH RBC QN AUTO: 29.6 PG (ref 27–31)
MCHC RBC AUTO-ENTMCNC: 30.4 G/DL (ref 32–36)
MCV RBC AUTO: 97 FL (ref 82–98)
METHYLMALONATE SERPL-SCNC: 0.62 NMOL/ML
MONOCYTES # BLD AUTO: 1.1 K/UL (ref 0.3–1)
MONOCYTES NFR BLD: 13.3 % (ref 4–15)
NEUTROPHILS # BLD AUTO: 5.4 K/UL (ref 1.8–7.7)
NEUTROPHILS NFR BLD: 63.3 % (ref 38–73)
NRBC BLD-RTO: 0 /100 WBC
PHOSPHATE SERPL-MCNC: 4.8 MG/DL (ref 2.7–4.5)
PLATELET # BLD AUTO: 381 K/UL (ref 150–450)
PMV BLD AUTO: 11.9 FL (ref 9.2–12.9)
POCT GLUCOSE: 102 MG/DL (ref 70–110)
POCT GLUCOSE: 144 MG/DL (ref 70–110)
POCT GLUCOSE: 234 MG/DL (ref 70–110)
POCT GLUCOSE: 86 MG/DL (ref 70–110)
POTASSIUM SERPL-SCNC: 4.5 MMOL/L (ref 3.5–5.1)
PROT SERPL-MCNC: 6.8 G/DL (ref 6–8.4)
RBC # BLD AUTO: 2.57 M/UL (ref 4–5.4)
SODIUM SERPL-SCNC: 139 MMOL/L (ref 136–145)
WBC # BLD AUTO: 8.5 K/UL (ref 3.9–12.7)

## 2022-02-09 PROCEDURE — 84100 ASSAY OF PHOSPHORUS: CPT | Mod: HCNC | Performed by: STUDENT IN AN ORGANIZED HEALTH CARE EDUCATION/TRAINING PROGRAM

## 2022-02-09 PROCEDURE — 99232 SBSQ HOSP IP/OBS MODERATE 35: CPT | Mod: HCNC,,, | Performed by: INTERNAL MEDICINE

## 2022-02-09 PROCEDURE — 97530 THERAPEUTIC ACTIVITIES: CPT | Mod: HCNC

## 2022-02-09 PROCEDURE — 80053 COMPREHEN METABOLIC PANEL: CPT | Mod: HCNC | Performed by: STUDENT IN AN ORGANIZED HEALTH CARE EDUCATION/TRAINING PROGRAM

## 2022-02-09 PROCEDURE — 63600175 PHARM REV CODE 636 W HCPCS: Mod: HCNC | Performed by: STUDENT IN AN ORGANIZED HEALTH CARE EDUCATION/TRAINING PROGRAM

## 2022-02-09 PROCEDURE — 27000207 HC ISOLATION: Mod: HCNC

## 2022-02-09 PROCEDURE — 99232 PR SUBSEQUENT HOSPITAL CARE,LEVL II: ICD-10-PCS | Mod: HCNC,,, | Performed by: NURSE PRACTITIONER

## 2022-02-09 PROCEDURE — 36415 COLL VENOUS BLD VENIPUNCTURE: CPT | Mod: HCNC | Performed by: STUDENT IN AN ORGANIZED HEALTH CARE EDUCATION/TRAINING PROGRAM

## 2022-02-09 PROCEDURE — 99232 PR SUBSEQUENT HOSPITAL CARE,LEVL II: ICD-10-PCS | Mod: HCNC,,, | Performed by: INTERNAL MEDICINE

## 2022-02-09 PROCEDURE — 99232 SBSQ HOSP IP/OBS MODERATE 35: CPT | Mod: HCNC,,, | Performed by: NURSE PRACTITIONER

## 2022-02-09 PROCEDURE — 25000003 PHARM REV CODE 250: Mod: HCNC | Performed by: STUDENT IN AN ORGANIZED HEALTH CARE EDUCATION/TRAINING PROGRAM

## 2022-02-09 PROCEDURE — 83735 ASSAY OF MAGNESIUM: CPT | Mod: HCNC | Performed by: STUDENT IN AN ORGANIZED HEALTH CARE EDUCATION/TRAINING PROGRAM

## 2022-02-09 PROCEDURE — 11000001 HC ACUTE MED/SURG PRIVATE ROOM: Mod: HCNC

## 2022-02-09 PROCEDURE — 25000003 PHARM REV CODE 250: Mod: HCNC

## 2022-02-09 PROCEDURE — 99232 SBSQ HOSP IP/OBS MODERATE 35: CPT | Mod: HCNC,,, | Performed by: HOSPITALIST

## 2022-02-09 PROCEDURE — 99232 PR SUBSEQUENT HOSPITAL CARE,LEVL II: ICD-10-PCS | Mod: HCNC,,, | Performed by: HOSPITALIST

## 2022-02-09 PROCEDURE — 85025 COMPLETE CBC W/AUTO DIFF WBC: CPT | Mod: HCNC | Performed by: STUDENT IN AN ORGANIZED HEALTH CARE EDUCATION/TRAINING PROGRAM

## 2022-02-09 PROCEDURE — 97535 SELF CARE MNGMENT TRAINING: CPT | Mod: HCNC

## 2022-02-09 RX ADMIN — RIVAROXABAN 2.5 MG: 2.5 TABLET, FILM COATED ORAL at 08:02

## 2022-02-09 RX ADMIN — RIVAROXABAN 2.5 MG: 2.5 TABLET, FILM COATED ORAL at 05:02

## 2022-02-09 RX ADMIN — INSULIN DETEMIR 10 UNITS: 100 INJECTION, SOLUTION SUBCUTANEOUS at 08:02

## 2022-02-09 RX ADMIN — INSULIN ASPART 5 UNITS: 100 INJECTION, SOLUTION INTRAVENOUS; SUBCUTANEOUS at 05:02

## 2022-02-09 RX ADMIN — CARVEDILOL 3.12 MG: 3.12 TABLET, FILM COATED ORAL at 08:02

## 2022-02-09 RX ADMIN — CLOPIDOGREL 75 MG: 75 TABLET, FILM COATED ORAL at 08:02

## 2022-02-09 RX ADMIN — INSULIN ASPART 4 UNITS: 100 INJECTION, SOLUTION INTRAVENOUS; SUBCUTANEOUS at 12:02

## 2022-02-09 RX ADMIN — POLYETHYLENE GLYCOL 3350 17 G: 17 POWDER, FOR SOLUTION ORAL at 08:02

## 2022-02-09 RX ADMIN — INSULIN ASPART 5 UNITS: 100 INJECTION, SOLUTION INTRAVENOUS; SUBCUTANEOUS at 12:02

## 2022-02-09 RX ADMIN — SENNOSIDES 8.6 MG: 8.6 TABLET ORAL at 08:02

## 2022-02-09 RX ADMIN — SERTRALINE HYDROCHLORIDE 50 MG: 50 TABLET ORAL at 08:02

## 2022-02-09 RX ADMIN — ATORVASTATIN CALCIUM 80 MG: 40 TABLET, FILM COATED ORAL at 08:02

## 2022-02-09 NOTE — PT/OT/SLP PROGRESS
"Occupational Therapy   Treatment    Name: Suyapa Connelly  MRN: [id number]  Admitting Diagnosis:  Severe sepsis       Recommendations:     Discharge Recommendations: rehabilitation facility  Discharge Equipment Recommendations:  none  Barriers to discharge:  None    Assessment:     Suyapa Connelly is a 56 y.o. female with a medical diagnosis of Severe sepsis.  She presents tearful with worries about feeling "not like myself, I dont know where I go from here." Performance deficits affecting function are impaired self care skills,impaired functional mobilty.  The patient completed bed mobility with rolling to each side, long sit and EOB sit with supervion using bed railings, Grooming sitting at EOB with set up. Encouraged patient to set goals and determine what skills will make her feel more powerful in her daily life.     Rehab Prognosis:  Good; patient would benefit from acute skilled OT services to address these deficits and reach maximum level of function.       Plan:     Patient to be seen 3 x/week to address the above listed problems via self-care/home management,therapeutic activities,sensory integration  · Plan of Care Expires: 03/01/22  · Plan of Care Reviewed with: patient    Subjective   "not like myself, I dont know where I go from here."   Pain/Comfort:  · Pain Rating 1: 0/10  · Pain Rating Post-Intervention 1: 0/10    Objective:     Communicated with: RN prior to session.  Patient found HOB elevated with telemetry upon OT entry to room.    General Precautions: Standard, fall   Orthopedic Precautions:N/A   Braces: N/A  Respiratory Status: Room air     Occupational Performance:     Bed Mobility:    · Patient completed Rolling/Turning to Left with  supervision  · Patient completed Rolling/Turning to Right with supervision  · Patient completed Scooting/Bridging with supervision  · Patient completed Supine to Sit with supervision  · Patient completed Sit to Supine with supervision       Activities of Daily " Living:  · Feeding:  independence    · Grooming: supervision  set up at EOB  · Upper Body Dressing: supervision set up at EOB  · Lower Body Dressing: supervision set up in long sit      AMPAC 6 Click ADL: 20    Treatment & Education:  Education on goal setting and self reflection to improve mood and increase feeling of power in her life    Patient left sitting edge of bed with call button in reachEducation:      GOALS:   Multidisciplinary Problems     Occupational Therapy Goals        Problem: Occupational Therapy Goal    Goal Priority Disciplines Outcome Interventions   Occupational Therapy Goal     OT, PT/OT Ongoing, Progressing    Description: Goals to be met by: 03/01/22    Patient will increase functional independence with ADLs by performing:    LE Dressing with Stand-by Assistance.  Toileting from bedside commode with Set-up Assistance for hygiene and clothing management.   Sliding board transfers with min (A) to drop arm C                   Time Tracking:     OT Date of Treatment: 02/09/22  OT Start Time: 1045  OT Stop Time: 1117  OT Total Time (min): 32 min    Billable Minutes:Self Care/Home Management 15  Therapeutic Activity 17    OT/MACKENZIE: OT          2/9/2022

## 2022-02-09 NOTE — SUBJECTIVE & OBJECTIVE
Interval History: improvement in serum creatinine again    Review of patient's allergies indicates:   Allergen Reactions    Ciprofloxacin Itching    Contrast media      Kidney injury    Iodine      Kidney injury     Current Facility-Administered Medications   Medication Frequency    acetaminophen tablet 650 mg Q4H PRN    albuterol sulfate nebulizer solution 2.5 mg Q4H PRN    atorvastatin tablet 80 mg QHS    carvediloL tablet 3.125 mg BID    clopidogreL tablet 75 mg Daily    COVID-19 vac, aden(Pfizer)(PF) (Pfizer COVID-19) 30 mcg/0.3 mL injection 0.3 mL vaccine x 1 dose    dextrose 10% bolus 125 mL PRN    dextrose 10% bolus 250 mL PRN    glucagon (human recombinant) injection 1 mg PRN    glucose chewable tablet 16 g PRN    glucose chewable tablet 24 g PRN    hydrALAZINE tablet 25 mg Q8H PRN    HYDROcodone-acetaminophen 5-325 mg per tablet 1 tablet Q6H PRN    insulin aspart U-100 pen 1-10 Units QID (AC + HS) PRN    insulin aspart U-100 pen 5 Units TIDWM    insulin detemir U-100 pen 10 Units QHS    melatonin tablet 6 mg Nightly PRN    naloxone 0.4 mg/mL injection 0.02 mg PRN    ondansetron disintegrating tablet 8 mg Q8H PRN    polyethylene glycol packet 17 g Daily    rivaroxaban tablet 2.5 mg BID WM    senna tablet 8.6 mg Daily    sertraline tablet 50 mg Daily    sodium chloride 0.9% flush 10 mL Q12H PRN       Objective:     Vital Signs (Most Recent):  Temp: 98.3 °F (36.8 °C) (02/09/22 1113)  Pulse: 85 (02/09/22 1113)  Resp: 19 (02/09/22 1113)  BP: (!) 152/72 (02/09/22 1113)  SpO2: (!) 93 % (02/09/22 1113)  O2 Device (Oxygen Therapy): room air (02/07/22 1251) Vital Signs (24h Range):  Temp:  [96.7 °F (35.9 °C)-98.3 °F (36.8 °C)] 98.3 °F (36.8 °C)  Pulse:  [85-92] 85  Resp:  [16-19] 19  SpO2:  [93 %-99 %] 93 %  BP: (118-184)/(57-86) 152/72     Weight: 67.6 kg (149 lb 0.5 oz) (02/01/22 1608)  Body mass index is 24.8 kg/m².  Body surface area is 1.76 meters squared.    I/O last 3 completed  shifts:  In: 360 [P.O.:360]  Out: -     Physical Exam  Constitutional:       General: She is not in acute distress.     Appearance: She is obese.   HENT:      Mouth/Throat:      Mouth: Mucous membranes are moist.   Eyes:      General: No scleral icterus.     Extraocular Movements: Extraocular movements intact.      Pupils: Pupils are equal, round, and reactive to light.   Cardiovascular:      Rate and Rhythm: Normal rate and regular rhythm.   Pulmonary:      Effort: Pulmonary effort is normal. No respiratory distress.   Abdominal:      General: There is no distension.      Palpations: Abdomen is soft.   Musculoskeletal:         General: Deformity (BL LE amputations) present.   Skin:     Coloration: Skin is not jaundiced.   Neurological:      General: No focal deficit present.      Mental Status: She is alert.         Significant Labs:  All labs within the past 24 hours have been reviewed.     Significant Imaging:  Labs: Reviewed

## 2022-02-09 NOTE — PLAN OF CARE
CM supervisor escalated pre-auth # [id number] to [facility] Managed Medicare for [facility].    [facility]  Case   Ext [phone]

## 2022-02-09 NOTE — PLAN OF CARE
Left VM with [facility] Lucretia Mujica [phone] Ext. [phone], to see if they have authorized patient for [facility].     Also met with patient at bedside. Dr. Delgado was present. Reported to the patient that I was waiting on auth from [facility] her her to go to [facility].      Tammy Will, [facility]  [phone]

## 2022-02-09 NOTE — PHYSICIAN QUERY
PT Name: Suyapa Connelly  MR #: [id number]    DOCUMENTATION CLARIFICATION     CDS/: DIONISIO Lim, RN, CDS               Contact information:[email or web address]   This form is a permanent document in the medical record.     Query Date: February 9, 2022    By submitting this query, we are merely seeking further clarification of documentation. Please utilize your independent clinical judgment when addressing the question(s) below.    The Medical Record contains the following:   Indicators   Supporting Clinical Findings Location in Medical Record   X AMS, Confusion,  LOC, etc.   Presents for acute encephalopathy and ROSLYN with concern for sepsis.   notes last known normal was one week prior and on exam she appears to have myoclonic jerks       I suspect her worsening mentation and jerks are 2/2 a build up of her pregabalin complicated by ROSLYN      Altered mental status-Likely in setting of infection     Increased confusion     Patient's mental status slowly improving throughout hospitalization, likely AMS due to both pregabalin intox and sepsis    H&P, Dr. Fair/Dr. Eugene, 1/31                       , Dr. Delgado, 2/3   X Acute/Chronic Illness  Sepsis due to wound infection, SP BKA, ROSLYN, Type 2 diabetes mellitus with diabetic nephropathy, with long-term current use of insulin, PMHx CKD3, DMII, GERD, HLD, HTN, osteomyelitis, PVD, depression, and anxiety     H&P, Dr. Fair/Dr. Eugene, 1/31   X Radiology Findings  CT head impression:  1. No acute intracranial process.  2. Involutional changes with chronic microvascular ischemic changes.    CT 1/31    Electrolyte Imbalance      Medication     X Treatment          Cefepime and vanc given hx of Pseudomonal infection   holding home pregabalin     Pregabalin was withheld due to patient's myoclonus, likely 2/2 to ROSLYN leading to increased serum concentrations.     ROSLYN resolved with IVF and abx. Increasing limb pain, pregabalin restarted at low dose.  Azithromycin and vanc discontinued and continuing with meropenem. ID with final recs to continue through the 7th with meropenem      H&P, Dr. Fair/Dr. Eugene, 1/31        , Dr. Delgado, 2/3    Other       The noted clinical guidelines are only system guidelines and do not replace the providers clinical judgment.    The National [place] of Neurologic Disorders and Stroke (NINDS) of the NIH describes encephalopathy as any diffuse disease of the brain that alters brain function or structure.    Provider, please specify the Type(s)  of Encephalopathy associated with above clinical findings.    [  x ] Metabolic Encephalopathy - Due to electrolyte imbalance, metabolic derangements, or infectious processes, includes Septic Encephalopathy, Uremic Encephalopathy   [   ] Toxic Encephalopathy - Due to drugs, chemicals, or other toxic substances   [   ] Encephalopathy, unspecified      [   ] Other Encephalopathy (please specify): ____________________   [   ] Other neurological condition- Includes Post-ictal altered mental status (please specify condition): __________   [   ]  Clinically Undetermined           Please document in your progress notes daily for the duration of treatment until resolved, and include in your discharge summary.    References:  JOSE ALFREDO Lion RN, CCDS. (2018, June 9). Notes from the Instructor: Encephalopathy tips. Retrieved October 22, 2020, from [email or web address].[email or web address]/articles/[email or web address]-instructor-encephalopathy-tips    ICD-9-CM Coding Clinic First Quarter 2013, Effective with discharges: October 21, 2013 [facility] [employer] § Seizure with encephalopathy due to postictal state (2013).    ICD-10-CM/[employer] Integrated Codebook (Version V.20.8.10.0) [Computer software]. (2020). Retrieved October 21, 2020.    National [place] of Neurological Disorders and Stroke. (2019, March 27). Retrieved October 22, 2020, from  https://www.ninds.nih.gov/Disorders/All-Disorders/Zlnilbdsnfqkxm-Otgzeeslsjj-Amiw    Form No. 35989

## 2022-02-09 NOTE — PLAN OF CARE
Problem: Adult Inpatient Plan of Care  Goal: Plan of Care Review  Outcome: Ongoing, Progressing  Goal: Patient-Specific Goal (Individualized)  Outcome: Ongoing, Progressing  Goal: Absence of Hospital-Acquired Illness or Injury  Outcome: Ongoing, Progressing  Goal: Optimal Comfort and Wellbeing  Outcome: Ongoing, Progressing   AAOx4. VS stable. No falls or injuries. Bed low, wheels locked, side rails up x2, call light within reach. Pt reports no current needs. Will continue to monitor.

## 2022-02-09 NOTE — ASSESSMENT & PLAN NOTE
HPatient with acute kidney injury likely d/t IVVD/Dehydration Which is currently worsening. Labs reviewed- Renal function/electrolytes with Estimated Creatinine Clearance: 40.9 mL/min (based on SCr of 1.4 mg/dL). according to latest data. Monitor urine output and serial BMP and adjust therapy as needed. Avoid nephrotoxins and renally dose meds for GFR listed above.   - improved with IVF, likely pre renal    Plan  - strict I/Os  - renally dose meds  - avoid nephrotoxic agents  - discontinue mirtazipine and hold lyrica  - Nephro consulted, recs following:   -UA 3+ prot, 6 RBC, 53 WBC  -UPCR 5.6 most likely secondary to diabetic kidney disease  - Cr 1.5-1.7, Nephrology consulted, states this is likely new baseline and to follow up with PCP  -not volume overloaded, no severe acidosis, no severe electrolyte abnormalities, no signs of uremia  -no need for diuresis or IV fluids  -no intervention from a nephrology standpoint    -Resolving, near her baseline.

## 2022-02-09 NOTE — PROGRESS NOTES
[facility]  Physical Medicine & Rehab  Progress Note    Patient Name: Suyapa Connelly  MRN: [id number]  Admission Date: 1/31/2022  Length of Stay: 8 days  Attending Physician: Flavia Delgado MD    Subjective:     Principal Problem:Severe sepsis    Hospital Course:   PT- 02/03    Functional Mobility:  · Bed Mobility:     · Supine to Sit: contact guard assistance   · Sit to Supine: minimum assistance for LE management  · Transfers:     · Bed to Chair: minimum assistance with none for scooting  using  Scooting  1. Balance:   ? Sitting: FAIR+: Maintains balance through MINIMAL excursions of active trunk motion    OT- 02/03    Bed Mobility:    · Patient completed Rolling/Turning to Left with  independence  · Patient completed Rolling/Turning to Right with independence  · Patient completed Supine to Sit with independence and long sit  · Patient completed Sit to Supine with independence     ·       Activities of Daily Living:  · Feeding:  independence    · Grooming: independence    · Upper Body Dressing: stand by assistance with gown in long sit  · Lower Body Dressing: moderate assistance as per patient report    2/9/22: Participated w/ OT. Bed mobility SV. All adl's SV.          Interval History 2/9/2022:  Patient is seen for follow-up PM&R evaluation and recommendations: Participating with therapy. Pain controlled.     HPI, Past Medical, Family, and Social History remains the same as documented in the initial encounter.    Scheduled Medications:    atorvastatin  80 mg Oral QHS    carvediloL  3.125 mg Oral BID    clopidogreL  75 mg Oral Daily    insulin aspart U-100  5 Units Subcutaneous TIDWM    insulin detemir U-100  10 Units Subcutaneous QHS    polyethylene glycol  17 g Oral Daily    rivaroxaban  2.5 mg Oral BID WM    senna  8.6 mg Oral Daily    sertraline  50 mg Oral Daily       Diagnostic Results:   Labs: Reviewed   EKG: Reviewed   CT: Reviewed    PRN Medications: acetaminophen, albuterol sulfate,  sars-cov-2 (covid-19), dextrose 10%, dextrose 10%, glucagon (human recombinant), glucose, glucose, hydrALAZINE, HYDROcodone-acetaminophen, insulin aspart U-100, melatonin, naloxone, ondansetron, sodium chloride 0.9%    Review of Systems   Constitutional: Positive for activity change.   HENT: Negative.    Eyes: Negative.    Respiratory: Negative.    Cardiovascular: Negative.    Gastrointestinal: Negative.    Endocrine: Negative.    Genitourinary: Negative.    Musculoskeletal: Positive for gait problem.   Skin: Positive for wound.   Neurological: Positive for weakness.   Psychiatric/Behavioral: Negative.      Objective:     Vital Signs (Most Recent):  Temp: 98.3 °F (36.8 °C) (02/09/22 1113)  Pulse: 85 (02/09/22 1113)  Resp: 19 (02/09/22 1113)  BP: (!) 152/72 (02/09/22 1113)  SpO2: (!) 93 % (02/09/22 1113)    Vital Signs (24h Range):  Temp:  [96.4 °F (35.8 °C)-98.3 °F (36.8 °C)] 98.3 °F (36.8 °C)  Pulse:  [85-94] 85  Resp:  [16-19] 19  SpO2:  [93 %-99 %] 93 %  BP: (118-184)/(57-86) 152/72     Physical Exam  Vitals and nursing note reviewed.   Constitutional:       Appearance: Normal appearance.   HENT:      Head: Atraumatic.   Pulmonary:      Effort: Pulmonary effort is normal.   Abdominal:      Palpations: Abdomen is soft.   Musculoskeletal:      Cervical back: Normal range of motion.   R BKA with dressing in place and L AKA   Decreased mobility   Skin:     General: Skin is warm and dry.   Neurological:    Following commands  Psychiatric:         Mood and Affect: Mood normal.         Behavior: Behavior normal.         Assessment/Plan:      * Severe sepsis    -Wound infection to Right BKA.  -Per general surgery, no interventions recommended.  -Wound care.  -Meropenem per ID till 02/07.      Altered mental state  -Resolved. Monitor.     Impaired functional mobility and endurance  See hospital course for functional status.    Recommendations  -  Encourage mobility, OOB in chair, and early ambulation as appropriate  -   PT/OT evaluate and treat  -  Pain management  -  DVT prophylaxis if appropriate   -  Monitor for and prevent skin breakdown and pressure ulcers  · Early mobility, repositioning/weight shifting every 20-30 minutes when sitting, turn patient every 2 hours, proper mattress/overlay and chair cushioning, pressure relief/heel protector boots      Acute blood loss anemia  - transfused this admission  - stable    PM&R Recommendation:     At this time, the PM&R team has reviewed this patient's ongoing medical case including inpatient diagnosis, medical history, clinical examination, labs, vitals, current social and functional history to provide the post-acute recommendation as follows:     RECOMMENDATIONS: inpatient rehabilitation due to good motivation/participation with therapies and good potential for recovery.    MEDICAL STABILITY:     At this time, no barriers for post-acute rehab admission.     I will sign off with the transfer of the patient to [facility] liaison who will continue to follow going forward until admission to [facility].     Genny Berger NP  Department of Physical Medicine & Rehab   [facility]

## 2022-02-09 NOTE — PLAN OF CARE
Problem: Adjustment to Illness (Sepsis/Septic Shock)  Goal: Optimal Coping  Outcome: Ongoing, Progressing     Problem: Infection Progression (Sepsis/Septic Shock)  Goal: Absence of Infection Signs and Symptoms  Outcome: Ongoing, Progressing     Problem: Fluid and Electrolyte Imbalance (Acute Kidney Injury/Impairment)  Goal: Fluid and Electrolyte Balance  Outcome: Ongoing, Progressing     Problem: Renal Function Impairment (Acute Kidney Injury/Impairment)  Goal: Effective Renal Function  Outcome: Ongoing, Progressing     Problem: Infection  Goal: Absence of Infection Signs and Symptoms  Outcome: Ongoing, Progressing     Problem: Impaired Wound Healing  Goal: Optimal Wound Healing  Outcome: Ongoing, Progressing     Problem: Skin Injury Risk Increased  Goal: Skin Health and Integrity  Outcome: Ongoing, Progressing     Problem: Fall Injury Risk  Goal: Absence of Fall and Fall-Related Injury  Outcome: Ongoing, Progressing

## 2022-02-09 NOTE — PROGRESS NOTES
"[facility] Medicine  Progress Note    Patient Name: Suyapa Connelly  MRN: [id number]  Patient Class: IP- Inpatient   Admission Date: 1/31/2022  Length of Stay: 8 days  Attending Physician: Flavia Delgado MD  Primary Care Provider: Magen Christensen MD        Subjective:     Principal Problem:Severe sepsis        HPI:  Suyapa Connelly is a 55 y/o female with PMHx CKD3, DMII, GERD, HLD, HTN, osteomyelitis, PVD, depression, and anxiety presents to ED with  for concerns of increased confusion from her rehab facility.  at bedside is concern for progressively worsening appearance of RLE wound, along with continued drainage, and worsening mental status of his wife which has progressed through this week. Patient emotionally labile and responds yes or no to most questions. She reports increased pain in her R leg with nausea over the past week. She also reports constipation without abdominal pain. She denies dysuria. Patient denies fevers, chills, vomiting, chest pain, cough, and shortness of breath.     Patient underwent right BKA by Dr. Rojas on 12/28 then discharged to rehab facility on 1/10. Since then she has recurrent hospital visits for the wound, requiring revisions and debridements; the last resulted in a pseudomonas positive wound culture resistant to cefepime 1/20. [facility] rehab RN stated that patient was "very confused this morning and more lethargic than normal." Per rehab, pt is normally AOx3 and able to follow commands. Rehab states they were concerned for sepsis due to elevated WBC and ROSLYN. Per , her last known normal was 1 weeks ago.    She was admitted to hospital medicine for severe sepsis.       Overview/Hospital Course:  Patient was admitted to hospital 1/31 for AMS and concern about worsening appearance of RLE wound. Patient was started on antibiotics (cefepime, vancomycin & azithromycin), blood, wound, resp & urine samples were collected for culture due to " concern for sepsis and ID and Gen Surg (s/p BKA on 12/28) were consulted. Pregabalin was withheld due to patient's myoclonus, likely 2/2 to ROSLYN leading to increased serum concentrations. Patient's mental status slowly improving throughout hospitalization, likely AMS due to both pregabalin intox and sepsis. Azithromycin and vanc discontinued and continuing with meropenem. ID with final recs to continue through the 7th with meropenem, completed during admission. Holding pregabalin and mirtazapine due to return of myoclonus and AMS. Patient can re address should pain worsen with PCP. Nephro consulted for persistent ROSLYN (Cr between 1.5-1.7), states this is new baseline for CKD. Right BKA wound healing well. Medically ready for discharge to rehab on 2/7.      Interval History: Symptoms:  Improved.   Diet:  Adequate intake.    Activity level:  Impaired due to weakness. Returning to normal.  Pain:  No pain.      Review of Systems   Constitutional: Negative for chills and fever.   HENT: Negative for congestion and rhinorrhea.    Eyes: Negative for pain and visual disturbance.   Respiratory: Negative for chest tightness and shortness of breath.    Cardiovascular: Negative for chest pain, palpitations and leg swelling.   Gastrointestinal: Negative for abdominal distention, abdominal pain, constipation, diarrhea, nausea and vomiting.   Genitourinary: Negative for difficulty urinating and dysuria.   Musculoskeletal: Negative for arthralgias, back pain and neck pain.   Skin: Positive for wound (improved). Negative for rash.   Neurological: Negative for dizziness and headaches.   Psychiatric/Behavioral: Negative for confusion and sleep disturbance.     Objective:     Vital Signs (Most Recent):  Temp: 98.3 °F (36.8 °C) (02/09/22 1113)  Pulse: 85 (02/09/22 1113)  Resp: 19 (02/09/22 1113)  BP: (!) 152/72 (02/09/22 1113)  SpO2: (!) 93 % (02/09/22 1113) Vital Signs (24h Range):  Temp:  [96.7 °F (35.9 °C)-98.3 °F (36.8 °C)] 98.3 °F  (36.8 °C)  Pulse:  [85-92] 85  Resp:  [16-19] 19  SpO2:  [93 %-99 %] 93 %  BP: (118-184)/(57-86) 152/72     Weight: 67.6 kg (149 lb 0.5 oz)  Body mass index is 24.8 kg/m².    Intake/Output Summary (Last 24 hours) at 2/9/2022 1522  Last data filed at 2/9/2022 1303  Gross per 24 hour   Intake 590 ml   Output --   Net 590 ml      Physical Exam  Vitals and nursing note reviewed.   Constitutional:       General: She is not in acute distress.     Appearance: Normal appearance. She is not ill-appearing or diaphoretic.   HENT:      Head: Normocephalic and atraumatic.      Right Ear: External ear normal.      Left Ear: External ear normal.      Nose: Nose normal. No congestion or rhinorrhea.      Mouth/Throat:      Mouth: Mucous membranes are moist.      Pharynx: Oropharynx is clear.   Eyes:      General: No scleral icterus.     Extraocular Movements: Extraocular movements intact.      Pupils: Pupils are equal, round, and reactive to light.   Cardiovascular:      Rate and Rhythm: Normal rate and regular rhythm.      Pulses: Normal pulses.      Heart sounds: Normal heart sounds.   Pulmonary:      Effort: Pulmonary effort is normal.      Breath sounds: Normal breath sounds. No wheezing, rhonchi or rales.   Abdominal:      General: Bowel sounds are normal. There is no distension.      Palpations: Abdomen is soft.      Tenderness: There is no abdominal tenderness. There is no right CVA tenderness or left CVA tenderness.   Musculoskeletal:         General: Normal range of motion.      Cervical back: Normal range of motion and neck supple. No tenderness.      Comments: R. BKA + L. AKA, R.BKA bandaged and can tolerate touch without pain.    Lymphadenopathy:      Cervical: No cervical adenopathy.   Skin:     General: Skin is warm and dry.   Neurological:      Mental Status: She is alert and oriented to person, place, and time.   Psychiatric:         Mood and Affect: Mood normal.         Behavior: Behavior normal.         Thought  Content: Thought content normal.         Judgment: Judgment normal.         Significant Labs:   All pertinent labs within the past 24 hours have been reviewed.  CBC:   Recent Labs   Lab 02/08/22 0415 02/09/22 0225   WBC 10.22 8.50   HGB 7.5* 7.6*   HCT 23.8* 25.0*    381     CMP:   Recent Labs   Lab 02/08/22 0415 02/09/22 0225    139   K 4.7 4.5    107   CO2 24 24   GLU 97 112*   BUN 65* 61*   CREATININE 1.7* 1.4   CALCIUM 9.2 9.5   PROT 6.7 6.8   ALBUMIN 1.7* 1.7*   BILITOT 0.2 0.3   ALKPHOS 109 91   AST 37 28   ALT 17 15   ANIONGAP 8 8   EGFRNONAA 33.5* 42.3*     POCT Glucose:   Recent Labs   Lab 02/08/22 2123 02/09/22  0712 02/09/22  1111   POCTGLUCOSE 115* 86 234*       Significant Imaging: I have reviewed all pertinent imaging results/findings within the past 24 hours.           Assessment/Plan:      * Severe sepsis  Ms. Connelly is a 57 yo female s/p r BKA and hx of Pseudomonas infection at the site of surgery presenting with AMS, leukocytosis, and elevated procalc  - likely sepsis due to wound infection/OM  - ESR and CRP slightly elevated  - Procalc of 0.38  -MRI Sacrum and R leg without evidence of OM  - Gen Surg following given repeated wound infection s/p BKA, no surgical intervention, signing off  - blood cx x2, wound cx, resp cx NGTD  - urine cx NGTD to r/o other source     Plan  - wound care consulted, continue to appreciate recs  - Inflammatory markers elevated, confirming likely source is wound  - CBC daily to trend WCC  - Completed meropenem 2g q12 until 2/7 per ID recs        Dermatitis associated with moisture    Wound care following for sacral wound:  - wound evaluation consistent with moisture associated dermatitis.  - wound previously documented as decubitus ulcer of sacral region, unstageable.  - pt is incontinent and wound appears to be related to moisture.  - continue Triad bid/prn cleaning.  - waffle overlay mattress in place. Inflated per RN at bedside.  - pt  encouraged to turn q2h.  - nursing to maintain pressure injury prevention measures.     Myoclonus  Patient has myoclonal jerks on exam  - likely in setting of pregabalin build with ROSLYN  - improving with holding meds    Plan  - Discontinue lyrica 50 mg BID;   - Discontinue mirtazipine  qhs  - follow up with PCP  - neuro checks  - delirium precautions      ROSLYN (acute kidney injury)  HPatient with acute kidney injury likely d/t IVVD/Dehydration Which is currently worsening. Labs reviewed- Renal function/electrolytes with Estimated Creatinine Clearance: 40.9 mL/min (based on SCr of 1.4 mg/dL). according to latest data. Monitor urine output and serial BMP and adjust therapy as needed. Avoid nephrotoxins and renally dose meds for GFR listed above.   - improved with IVF, likely pre renal    Plan  - strict I/Os  - renally dose meds  - avoid nephrotoxic agents  - discontinue mirtazipine and hold lyrica  - Nephro consulted, recs following:   -UA 3+ prot, 6 RBC, 53 WBC  -UPCR 5.6 most likely secondary to diabetic kidney disease  - Cr 1.5-1.7, Nephrology consulted, states this is likely new baseline and to follow up with PCP  -not volume overloaded, no severe acidosis, no severe electrolyte abnormalities, no signs of uremia  -no need for diuresis or IV fluids  -no intervention from a nephrology standpoint    -Resolving, near her baseline.       Altered mental state  Likely in setting of infection  - CTH negative    Plan  - neuro checks  - delirium precautions      S/P BKA (below knee amputation) unilateral, right  Patient had recent BKA at [facility] with recent debridement last week  - patient has active drainage from wound with continued pain and complications    Plan  - Norco 5mg PRN q6 for severe pain  - Wound Care consulted, continue to appreciate recs  - Gen Surg states no surgical intervention recommended at this time.   - ID recommend to continue with meropenem through 2/7, completed    Hypoalbuminemia  - Boost glucose  control TIDWM   - Heme/onc outpatient referral    Impaired functional mobility and endurance  PT/OT consulted, recommend returning to Rehab, awaiting acceptance    Chronic anemia    Chronic disease - MCV & MCH normal; denies acute blood loss, no indication for transfusion at this time.   Elevated TIBC, ferritin  Decreased transferrin    - Plan to transfuse if <7 with active bleeding    Type 2 diabetes mellitus with diabetic nephropathy, with long-term current use of insulin  Lab Results   Component Value Date    HGBA1C 9.6 (H) 12/03/2021     Plan  - Regular diet due to patient request  - MDSSI PRN with POCT glu TIDWM + QHS  - detemir 10 QD  - Aspart 5 TIDWM  - increase basal and add bolus regimen per requirements  - Consider reducing insulin dosing if Cr continues to climb due to possible poor insulin clearance      Acute blood loss anemia  Patient has Hgb of 6.6 on admission  Stable     Plan  - transfuse 1U pRBC  - Transfuse another 1u pRBC on 2/4.   - Anemia workup with iron, TIBC, b12 panel and FOBT. No apparent sources of bleeding.  - discontinue apixaban, and start xarelto 2.5mg BID + plavix 75 mg  - CBC daily, transfuse Hgb < 7      Mixed hyperlipidemia  Patient on atorvastatin 80mg at home    Plan  - continue home med     CAROLIN (generalized anxiety disorder)  Patient on melatonin, remeron, zoloft     Plan  - restart zoloft 50mg  - PRN melatonin  - Discontinue remeron 2/2 to Urinary retention and myoclonus,       Essential hypertension  History of hypertension, without home meds currently.    -restart carvedilol 3.125 bid   - hold home valsartan since ROSLYN, can resume once it is clear new baseline is 1.5-1.7, follow up with PCP  -Q4 vitals to monitor and titrate as needed    Moderate malnutrition  Boost TIDWM    VTE Risk Mitigation (From admission, onward)           Ordered     IP VTE HIGH RISK PATIENT  Once         01/31/22 1930                    Discharge Planning   DEVAN: 2/9/2022     Code Status: Full Code    Is the patient medically ready for discharge?: Yes    Reason for patient still in hospital (select all that apply): Pending disposition  Discharge Plan A: Rehab   Discharge Delays: None known at this time      Dominik Manley MD  Department of Hospital Medicine   [facility]                      02/09/2022                             STAFF PHYSICIAN NOTE                                   Attending Attestation for Rounds with Resident  I have reviewed and concur with the resident's history, physical, assessment, and plan.  I have personally interviewed and examined the patient at bedside and agree with the resident's findings.        55 yo with hx of bilat BKA, T2DM, depression, neuropathic pain presenting with recurrent sepsis likely due to wound infection (stump vs sacral) and PNA.  Long discussion w pt and her  about her care and welfare. Finished meropenem 2/7. Wounds photographed. MRDC to Rehab. Need auth.                                   Flavia Delgado MD  Senior Hospitalist  [id number], [phone]

## 2022-02-09 NOTE — SUBJECTIVE & OBJECTIVE
Interval History: Symptoms:  Improved.   Diet:  Adequate intake.    Activity level:  Impaired due to weakness. Returning to normal.  Pain:  No pain.      Review of Systems   Constitutional: Negative for chills and fever.   HENT: Negative for congestion and rhinorrhea.    Eyes: Negative for pain and visual disturbance.   Respiratory: Negative for chest tightness and shortness of breath.    Cardiovascular: Negative for chest pain, palpitations and leg swelling.   Gastrointestinal: Negative for abdominal distention, abdominal pain, constipation, diarrhea, nausea and vomiting.   Genitourinary: Negative for difficulty urinating and dysuria.   Musculoskeletal: Negative for arthralgias, back pain and neck pain.   Skin: Positive for wound (improved). Negative for rash.   Neurological: Negative for dizziness and headaches.   Psychiatric/Behavioral: Negative for confusion and sleep disturbance.     Objective:     Vital Signs (Most Recent):  Temp: 98.3 °F (36.8 °C) (02/09/22 1113)  Pulse: 85 (02/09/22 1113)  Resp: 19 (02/09/22 1113)  BP: (!) 152/72 (02/09/22 1113)  SpO2: (!) 93 % (02/09/22 1113) Vital Signs (24h Range):  Temp:  [96.7 °F (35.9 °C)-98.3 °F (36.8 °C)] 98.3 °F (36.8 °C)  Pulse:  [85-92] 85  Resp:  [16-19] 19  SpO2:  [93 %-99 %] 93 %  BP: (118-184)/(57-86) 152/72     Weight: 67.6 kg (149 lb 0.5 oz)  Body mass index is 24.8 kg/m².    Intake/Output Summary (Last 24 hours) at 2/9/2022 1522  Last data filed at 2/9/2022 1303  Gross per 24 hour   Intake 590 ml   Output --   Net 590 ml      Physical Exam  Vitals and nursing note reviewed.   Constitutional:       General: She is not in acute distress.     Appearance: Normal appearance. She is not ill-appearing or diaphoretic.   HENT:      Head: Normocephalic and atraumatic.      Right Ear: External ear normal.      Left Ear: External ear normal.      Nose: Nose normal. No congestion or rhinorrhea.      Mouth/Throat:      Mouth: Mucous membranes are moist.      Pharynx:  Oropharynx is clear.   Eyes:      General: No scleral icterus.     Extraocular Movements: Extraocular movements intact.      Pupils: Pupils are equal, round, and reactive to light.   Cardiovascular:      Rate and Rhythm: Normal rate and regular rhythm.      Pulses: Normal pulses.      Heart sounds: Normal heart sounds.   Pulmonary:      Effort: Pulmonary effort is normal.      Breath sounds: Normal breath sounds. No wheezing, rhonchi or rales.   Abdominal:      General: Bowel sounds are normal. There is no distension.      Palpations: Abdomen is soft.      Tenderness: There is no abdominal tenderness. There is no right CVA tenderness or left CVA tenderness.   Musculoskeletal:         General: Normal range of motion.      Cervical back: Normal range of motion and neck supple. No tenderness.      Comments: R. BKA + L. AKA, R.BKA bandaged and can tolerate touch without pain.    Lymphadenopathy:      Cervical: No cervical adenopathy.   Skin:     General: Skin is warm and dry.   Neurological:      Mental Status: She is alert and oriented to person, place, and time.   Psychiatric:         Mood and Affect: Mood normal.         Behavior: Behavior normal.         Thought Content: Thought content normal.         Judgment: Judgment normal.         Significant Labs:   All pertinent labs within the past 24 hours have been reviewed.  CBC:   Recent Labs   Lab 02/08/22 0415 02/09/22 0225   WBC 10.22 8.50   HGB 7.5* 7.6*   HCT 23.8* 25.0*    381     CMP:   Recent Labs   Lab 02/08/22 0415 02/09/22 0225    139   K 4.7 4.5    107   CO2 24 24   GLU 97 112*   BUN 65* 61*   CREATININE 1.7* 1.4   CALCIUM 9.2 9.5   PROT 6.7 6.8   ALBUMIN 1.7* 1.7*   BILITOT 0.2 0.3   ALKPHOS 109 91   AST 37 28   ALT 17 15   ANIONGAP 8 8   EGFRNONAA 33.5* 42.3*     POCT Glucose:   Recent Labs   Lab 02/08/22 2123 02/09/22  0712 02/09/22  1111   POCTGLUCOSE 115* 86 234*       Significant Imaging: I have reviewed all pertinent imaging  results/findings within the past 24 hours.

## 2022-02-09 NOTE — PROGRESS NOTES
[facility] - [facility]  Nephrology  Progress Note    Patient Name: Suyapa Connelly  MRN: [id number]  Admission Date: 1/31/2022  Hospital Length of Stay: 8 days  Attending Provider: Flavia Delgado MD   Primary Care Physician: Magen Christensen MD  Principal Problem:Severe sepsis    Subjective:     HPI: 56 yea rold female with DM2, CKD4, HTN, HLD, GERD, sacral decub, PVD s/p Bilateral AKA most recently right AKA on 12/28 with revision 1/24/22 and recent admission  for presumed surgical stump site infection on IV abx  at rehab who presented for acute encephalopathy and ROSLYN with concern for sepsis.    Nephrology consulted for ROSLYN      Interval History: improvement in serum creatinine again    Review of patient's allergies indicates:   Allergen Reactions    Ciprofloxacin Itching    Contrast media      Kidney injury    Iodine      Kidney injury     Current Facility-Administered Medications   Medication Frequency    acetaminophen tablet 650 mg Q4H PRN    albuterol sulfate nebulizer solution 2.5 mg Q4H PRN    atorvastatin tablet 80 mg QHS    carvediloL tablet 3.125 mg BID    clopidogreL tablet 75 mg Daily    COVID-19 vac, aden(Pfizer)(PF) (Pfizer COVID-19) 30 mcg/0.3 mL injection 0.3 mL vaccine x 1 dose    dextrose 10% bolus 125 mL PRN    dextrose 10% bolus 250 mL PRN    glucagon (human recombinant) injection 1 mg PRN    glucose chewable tablet 16 g PRN    glucose chewable tablet 24 g PRN    hydrALAZINE tablet 25 mg Q8H PRN    HYDROcodone-acetaminophen 5-325 mg per tablet 1 tablet Q6H PRN    insulin aspart U-100 pen 1-10 Units QID (AC + HS) PRN    insulin aspart U-100 pen 5 Units TIDWM    insulin detemir U-100 pen 10 Units QHS    melatonin tablet 6 mg Nightly PRN    naloxone 0.4 mg/mL injection 0.02 mg PRN    ondansetron disintegrating tablet 8 mg Q8H PRN    polyethylene glycol packet 17 g Daily    rivaroxaban tablet 2.5 mg BID WM    senna tablet 8.6 mg Daily    sertraline tablet 50 mg Daily    sodium  chloride 0.9% flush 10 mL Q12H PRN       Objective:     Vital Signs (Most Recent):  Temp: 98.3 °F (36.8 °C) (02/09/22 1113)  Pulse: 85 (02/09/22 1113)  Resp: 19 (02/09/22 1113)  BP: (!) 152/72 (02/09/22 1113)  SpO2: (!) 93 % (02/09/22 1113)  O2 Device (Oxygen Therapy): room air (02/07/22 1251) Vital Signs (24h Range):  Temp:  [96.7 °F (35.9 °C)-98.3 °F (36.8 °C)] 98.3 °F (36.8 °C)  Pulse:  [85-92] 85  Resp:  [16-19] 19  SpO2:  [93 %-99 %] 93 %  BP: (118-184)/(57-86) 152/72     Weight: 67.6 kg (149 lb 0.5 oz) (02/01/22 1608)  Body mass index is 24.8 kg/m².  Body surface area is 1.76 meters squared.    I/O last 3 completed shifts:  In: 360 [P.O.:360]  Out: -     Physical Exam  Constitutional:       General: She is not in acute distress.     Appearance: She is obese.   HENT:      Mouth/Throat:      Mouth: Mucous membranes are moist.   Eyes:      General: No scleral icterus.     Extraocular Movements: Extraocular movements intact.      Pupils: Pupils are equal, round, and reactive to light.   Cardiovascular:      Rate and Rhythm: Normal rate and regular rhythm.   Pulmonary:      Effort: Pulmonary effort is normal. No respiratory distress.   Abdominal:      General: There is no distension.      Palpations: Abdomen is soft.   Musculoskeletal:         General: Deformity (BL LE amputations) present.   Skin:     Coloration: Skin is not jaundiced.   Neurological:      General: No focal deficit present.      Mental Status: She is alert.         Significant Labs:  All labs within the past 24 hours have been reviewed.     Significant Imaging:  Labs: Reviewed    Assessment/Plan:     ROSLYN (acute kidney injury)  -non oliguric likely ischemic ATN secondary to hypotension during RLE amputations  -BL sCr 1-1.3 with repeated prior ROSLYN  -BL U Dipst Prot + since 2015 with a1c > 13 since 2015  -admit creat 2.1  -UA 3+ prot, 6 RBC, 53 WBC  -UPCR 5.6 most likely secondary to diabetic kidney disease  -SPEP, SFLC, IF without  paraproteinemia  -currently she has ROSLYN that is unresolved from admission and may be developing a new baseline near her old baseline  -not volume overloaded, no severe acidosis, no severe electrolyte abnormalities, no signs of uremia  -no need for diuresis or IV fluids  -nephrology singing off, please call with questions or concerns    S/P BKA (below knee amputation) unilateral, right  -per primary          Rolando Rivas MD  Nephrology  [facility]

## 2022-02-09 NOTE — ASSESSMENT & PLAN NOTE
-non oliguric likely ischemic ATN secondary to hypotension during RLE amputations  -BL sCr 1-1.3 with repeated prior ROSLYN  -BL U Dipst Prot + since 2015 with a1c > 13 since 2015  -admit creat 2.1  -UA 3+ prot, 6 RBC, 53 WBC  -UPCR 5.6 most likely secondary to diabetic kidney disease  -SPEP, SFLC, IF without paraproteinemia  -currently she has ROSLYN that is unresolved from admission and may be developing a new baseline near her old baseline  -not volume overloaded, no severe acidosis, no severe electrolyte abnormalities, no signs of uremia  -no need for diuresis or IV fluids  -nephrology singing off, please call with questions or concerns

## 2022-02-10 VITALS
WEIGHT: 149.06 LBS | OXYGEN SATURATION: 95 % | SYSTOLIC BLOOD PRESSURE: 146 MMHG | BODY MASS INDEX: 24.83 KG/M2 | RESPIRATION RATE: 16 BRPM | HEART RATE: 88 BPM | TEMPERATURE: 98 F | DIASTOLIC BLOOD PRESSURE: 78 MMHG | HEIGHT: 65 IN

## 2022-02-10 LAB
ALBUMIN SERPL BCP-MCNC: 1.9 G/DL (ref 3.5–5.2)
ALP SERPL-CCNC: 115 U/L (ref 55–135)
ALT SERPL W/O P-5'-P-CCNC: 39 U/L (ref 10–44)
ANION GAP SERPL CALC-SCNC: 4 MMOL/L (ref 8–16)
AST SERPL-CCNC: 82 U/L (ref 10–40)
BASOPHILS # BLD AUTO: 0.06 K/UL (ref 0–0.2)
BASOPHILS NFR BLD: 0.7 % (ref 0–1.9)
BILIRUB SERPL-MCNC: 0.2 MG/DL (ref 0.1–1)
BUN SERPL-MCNC: 46 MG/DL (ref 6–20)
CALCIUM SERPL-MCNC: 10 MG/DL (ref 8.7–10.5)
CHLORIDE SERPL-SCNC: 108 MMOL/L (ref 95–110)
CO2 SERPL-SCNC: 27 MMOL/L (ref 23–29)
CREAT SERPL-MCNC: 1.2 MG/DL (ref 0.5–1.4)
DIFFERENTIAL METHOD: ABNORMAL
EOSINOPHIL # BLD AUTO: 0.3 K/UL (ref 0–0.5)
EOSINOPHIL NFR BLD: 3.8 % (ref 0–8)
ERYTHROCYTE [DISTWIDTH] IN BLOOD BY AUTOMATED COUNT: 15.7 % (ref 11.5–14.5)
EST. GFR  (AFRICAN AMERICAN): 58.8 ML/MIN/1.73 M^2
EST. GFR  (NON AFRICAN AMERICAN): 51 ML/MIN/1.73 M^2
GLUCOSE SERPL-MCNC: 89 MG/DL (ref 70–110)
HCT VFR BLD AUTO: 26.3 % (ref 37–48.5)
HGB BLD-MCNC: 8 G/DL (ref 12–16)
IMM GRANULOCYTES # BLD AUTO: 0.02 K/UL (ref 0–0.04)
IMM GRANULOCYTES NFR BLD AUTO: 0.2 % (ref 0–0.5)
LYMPHOCYTES # BLD AUTO: 1.9 K/UL (ref 1–4.8)
LYMPHOCYTES NFR BLD: 22.4 % (ref 18–48)
MAGNESIUM SERPL-MCNC: 2 MG/DL (ref 1.6–2.6)
MCH RBC QN AUTO: 29.1 PG (ref 27–31)
MCHC RBC AUTO-ENTMCNC: 30.4 G/DL (ref 32–36)
MCV RBC AUTO: 96 FL (ref 82–98)
MONOCYTES # BLD AUTO: 1 K/UL (ref 0.3–1)
MONOCYTES NFR BLD: 12.1 % (ref 4–15)
NEUTROPHILS # BLD AUTO: 5.1 K/UL (ref 1.8–7.7)
NEUTROPHILS NFR BLD: 60.8 % (ref 38–73)
NRBC BLD-RTO: 0 /100 WBC
PHOSPHATE SERPL-MCNC: 4 MG/DL (ref 2.7–4.5)
PLATELET # BLD AUTO: 415 K/UL (ref 150–450)
PMV BLD AUTO: 11.4 FL (ref 9.2–12.9)
POCT GLUCOSE: 136 MG/DL (ref 70–110)
POCT GLUCOSE: 90 MG/DL (ref 70–110)
POTASSIUM SERPL-SCNC: 4.2 MMOL/L (ref 3.5–5.1)
PROT SERPL-MCNC: 7.4 G/DL (ref 6–8.4)
RBC # BLD AUTO: 2.75 M/UL (ref 4–5.4)
SODIUM SERPL-SCNC: 139 MMOL/L (ref 136–145)
WBC # BLD AUTO: 8.41 K/UL (ref 3.9–12.7)

## 2022-02-10 PROCEDURE — 99239 PR HOSPITAL DISCHARGE DAY,>30 MIN: ICD-10-PCS | Mod: HCNC,GC,, | Performed by: STUDENT IN AN ORGANIZED HEALTH CARE EDUCATION/TRAINING PROGRAM

## 2022-02-10 PROCEDURE — 1111F DSCHRG MED/CURRENT MED MERGE: CPT | Mod: HCNC,CPTII,GC, | Performed by: STUDENT IN AN ORGANIZED HEALTH CARE EDUCATION/TRAINING PROGRAM

## 2022-02-10 PROCEDURE — 36415 COLL VENOUS BLD VENIPUNCTURE: CPT | Mod: HCNC | Performed by: STUDENT IN AN ORGANIZED HEALTH CARE EDUCATION/TRAINING PROGRAM

## 2022-02-10 PROCEDURE — 85025 COMPLETE CBC W/AUTO DIFF WBC: CPT | Mod: HCNC | Performed by: STUDENT IN AN ORGANIZED HEALTH CARE EDUCATION/TRAINING PROGRAM

## 2022-02-10 PROCEDURE — 80053 COMPREHEN METABOLIC PANEL: CPT | Mod: HCNC | Performed by: STUDENT IN AN ORGANIZED HEALTH CARE EDUCATION/TRAINING PROGRAM

## 2022-02-10 PROCEDURE — 25000003 PHARM REV CODE 250: Mod: HCNC

## 2022-02-10 PROCEDURE — 1111F PR DISCHARGE MEDS RECONCILED W/ CURRENT OUTPATIENT MED LIST: ICD-10-PCS | Mod: HCNC,CPTII,GC, | Performed by: STUDENT IN AN ORGANIZED HEALTH CARE EDUCATION/TRAINING PROGRAM

## 2022-02-10 PROCEDURE — 84100 ASSAY OF PHOSPHORUS: CPT | Mod: HCNC | Performed by: STUDENT IN AN ORGANIZED HEALTH CARE EDUCATION/TRAINING PROGRAM

## 2022-02-10 PROCEDURE — 83735 ASSAY OF MAGNESIUM: CPT | Mod: HCNC | Performed by: STUDENT IN AN ORGANIZED HEALTH CARE EDUCATION/TRAINING PROGRAM

## 2022-02-10 PROCEDURE — 99239 HOSP IP/OBS DSCHRG MGMT >30: CPT | Mod: HCNC,GC,, | Performed by: STUDENT IN AN ORGANIZED HEALTH CARE EDUCATION/TRAINING PROGRAM

## 2022-02-10 PROCEDURE — 25000003 PHARM REV CODE 250: Mod: HCNC | Performed by: STUDENT IN AN ORGANIZED HEALTH CARE EDUCATION/TRAINING PROGRAM

## 2022-02-10 PROCEDURE — 97542 WHEELCHAIR MNGMENT TRAINING: CPT | Mod: HCNC,CQ

## 2022-02-10 PROCEDURE — 97530 THERAPEUTIC ACTIVITIES: CPT | Mod: HCNC,CQ

## 2022-02-10 RX ORDER — OXYCODONE AND ACETAMINOPHEN 10; 325 MG/1; MG/1
1 TABLET ORAL EVERY 4 HOURS PRN
Status: CANCELLED | OUTPATIENT
Start: 2022-02-10

## 2022-02-10 RX ORDER — OXYCODONE HYDROCHLORIDE 10 MG/1
10 TABLET ORAL ONCE
Status: COMPLETED | OUTPATIENT
Start: 2022-02-10 | End: 2022-02-10

## 2022-02-10 RX ORDER — OXYCODONE AND ACETAMINOPHEN 10; 325 MG/1; MG/1
1 TABLET ORAL EVERY 4 HOURS PRN
Status: DISCONTINUED | OUTPATIENT
Start: 2022-02-10 | End: 2022-02-10 | Stop reason: HOSPADM

## 2022-02-10 RX ADMIN — INSULIN ASPART 5 UNITS: 100 INJECTION, SOLUTION INTRAVENOUS; SUBCUTANEOUS at 08:02

## 2022-02-10 RX ADMIN — SERTRALINE HYDROCHLORIDE 50 MG: 50 TABLET ORAL at 08:02

## 2022-02-10 RX ADMIN — RIVAROXABAN 2.5 MG: 2.5 TABLET, FILM COATED ORAL at 08:02

## 2022-02-10 RX ADMIN — OXYCODONE HYDROCHLORIDE 10 MG: 10 TABLET ORAL at 02:02

## 2022-02-10 RX ADMIN — CLOPIDOGREL 75 MG: 75 TABLET, FILM COATED ORAL at 08:02

## 2022-02-10 RX ADMIN — CARVEDILOL 3.12 MG: 3.12 TABLET, FILM COATED ORAL at 08:02

## 2022-02-10 RX ADMIN — INSULIN ASPART 5 UNITS: 100 INJECTION, SOLUTION INTRAVENOUS; SUBCUTANEOUS at 12:02

## 2022-02-10 NOTE — ASSESSMENT & PLAN NOTE
HPatient with acute kidney injury likely d/t IVVD/Dehydration Which is currently worsening. Labs reviewed- Renal function/electrolytes with Estimated Creatinine Clearance: 47.7 mL/min (based on SCr of 1.2 mg/dL). according to latest data. Monitor urine output and serial BMP and adjust therapy as needed. Avoid nephrotoxins and renally dose meds for GFR listed above.   - improved with IVF, likely pre renal    Plan  - strict I/Os  - renally dose meds  - avoid nephrotoxic agents  - discontinue mirtazipine and hold lyrica  - Nephro consulted, recs following:   -UA 3+ prot, 6 RBC, 53 WBC  -UPCR 5.6 most likely secondary to diabetic kidney disease  - Cr 1.5-1.7, Nephrology consulted, states this is likely new baseline and to follow up with PCP  -not volume overloaded, no severe acidosis, no severe electrolyte abnormalities, no signs of uremia  -no need for diuresis or IV fluids  -no intervention from a nephrology standpoint    -Resolving, near her baseline.

## 2022-02-10 NOTE — PLAN OF CARE
[facility] - Med Surg  Discharge Final Note    Primary Care Provider: Magen Christensen MD  Expected Discharge Date: 2/10/2022    Patient medically ready for discharge to [facility], [address] [address].  Nurse can call report to [phone].  Transportation yes per ambulance.   Is family/patient aware of discharge yes - patient.  Hospital follow up scheduled, n/a.  Final Discharge Note (most recent)       Final Note - 02/10/22 1234          Final Note    Assessment Type Final Discharge Note (P)      Anticipated Discharge Disposition Rehab Facility (P)      What phone number can be called within the next 1-3 days to see how you are doing after discharge? [phone] (P)         Post-Acute Status    Post-Acute Authorization Placement (P)      Post-Acute Placement Status Set-up Complete/Auth obtained (P)      Coverage [employer] Medicare (P)      Discharge Delays None known at this time (P)                      Important Message from Medicare  Important Message from Medicare regarding Discharge Appeal Rights: Explained to patient/caregiver,Signed/date by patient/caregiver,Other (comments) (verbal)   Date IMM was signed: 02/08/22  Time IMM was signed: 1005  Referral Info (most recent)       Referral Info    No documentation.                    Future Appointments   Date Time Provider Department Center   2/11/2022  9:00 AM Magen Christensen MD [facility] [address] [facility]   2/15/2022  1:45 PM Kaitlyn Rojas MD [facility]   2/24/2022 10:00 AM Kaitlyn Rojas MD [facility] W[facility]   4/4/2022  2:30 PM Fawad Jameson MD [facility] ENDODIA [facility]EN[facility]  Case Management  Ext: [phone]  02/10/2022

## 2022-02-10 NOTE — PT/OT/SLP PROGRESS
"Physical Therapy Treatment    Patient Name:  Suyapa Connelly   MRN:  [id number]    Recommendations:     Discharge Recommendations:  rehabilitation facility   Discharge Equipment Recommendations: none   Barriers to discharge: Decreased caregiver support    Assessment:     Suyapa Connelly is a 56 y.o. female admitted with a medical diagnosis of Severe sepsis.  She presents with the following impairments/functional limitations:  impaired self care skills,impaired functional mobilty,pain.    Patient eager to working with therapy and getting up out of bed. She required SBA with use of bedrails to transfer to EOB. She was able to Back onto wheelchair scooting with CGA, scooted forward from w/c to bed with Mod a as bed was slightly higher than wheelchair. She propelled self in wheelchair with S in pollock through obstacles, maneuvering turns in tight areas all with S. Rolled left<>right with S with use of rails. Will continue PT per POC.     Rehab Prognosis: Fair; patient would benefit from acute skilled PT services to address these deficits and reach maximum level of function.    Recent Surgery: * No surgery found *      Plan:     During this hospitalization, patient to be seen 4 x/week to address the identified rehab impairments via therapeutic activities,therapeutic exercises and progress toward the following goals:    · Plan of Care Expires:  03/05/22    Subjective     Chief Complaint: pain at times to R stump  Patient/Family Comments/goals: "I can't believe this is my life now".   Pain/Comfort:  · Pain Rating 1: 5/10  · Location - Side 1: Right  · Location - Orientation 1: generalized  · Location 1:  (residual limb)  · Pain Addressed 1: Pre-medicate for activity      Objective:     Communicated with Nurse prior to session.  Patient found supine with telemetry upon PT entry to room.     General Precautions: Standard, fall   Orthopedic Precautions:N/A   Braces: N/A  Respiratory Status: Room air     Functional " Mobility:  · Bed Mobility:     · Rolling Left:  modified independence and use of rails  · Rolling Right: modified independence and with use of rails  · Scooting: on level surface or from high surface to lower surface CGA and from low surface to slightly higher surface Mod a.   · Supine to Sit: stand by assistance  · Sit to Supine: contact guard assistance  · Transfers:     · Bed to Chair: contact guard assistance and moderate assistance with  no AD  using  Scoot Pivot and anteriorly/posteriorly      AM-PAC 6 CLICK MOBILITY  Turning over in bed (including adjusting bedclothes, sheets and blankets)?: 3  Sitting down on and standing up from a chair with arms (e.g., wheelchair, bedside commode, etc.): 1  Moving from lying on back to sitting on the side of the bed?: 3  Moving to and from a bed to a chair (including a wheelchair)?: 2  Need to walk in hospital room?: 1  Climbing 3-5 steps with a railing?: 1  Basic Mobility Total Score: 11       Therapeutic Activities and Exercises:   -white board updated  -educ patient on benefit of OOB activity  -excellent progress today, will continue to progress with aggressive PT/OT in IPR    Patient left supine with all lines intact and call button in reach..    GOALS:   Multidisciplinary Problems     Physical Therapy Goals        Problem: Physical Therapy Goal    Goal Priority Disciplines Outcome Goal Variances Interventions   Physical Therapy Goal     PT, PT/OT Ongoing, Progressing     Description: Goals to be met by: 22    Patient will increase functional independence with mobility by performin. Supine to sit with Set-up [place]  2. Sit to supine with Set-up [place]  3. Patient will demonstrate independence with a home exercise program  4. Bed to chair transfer with Stand-by Assistance using No Assistive Device  5. Wheelchair propulsion x250 feet with Stand-by Assistance using bilateral uppper extremities                   Time Tracking:     PT Received On:  02/10/22  PT Start Time: 1043     PT Stop Time: 1126  PT Total Time (min): 43 min     Billable Minutes: Therapeutic Activity 23 and Train/Wheelchair Management 20    Treatment Type: Treatment  PT/PTA: PTA     PTA Visit Number: 3     02/10/2022

## 2022-02-10 NOTE — NURSING
0723 Pt in bed resting with eyes closed, resp even and unlabored, no s/s distress/ discomfort, arouses to verbal stimuli, vitals done, communication board updated, bed in low locked position call light in reach    0838 Pt sitting up finished with breakfast, scheduled meds given, assisted with incont care for urine and smear of BM, pt able to turn and help move up in bed, dressing to right BKA intact , had some dried old serosanguinous drainage noted       1110 Pt to go to rehab today awaiting transport to be set up and a number to call report    1235 transport set up for 1530, will call report around 1500 per CM request    1405 Pt  and mother-in-law at bedside requesting pain medication, pt reports pain 10/10, not time for PRN yet will message doctor, updated family of time for transport and plans for discharge    1411 New order to dc Hydrocodone and will take Oxycodone IR once available from pharmacy, has Percocet prn Q 4 also ordered    1500 attempted to call report to nurse at [facility] that is receiving patient Gina stated will call back as not ready for report    1557 Gina returned call report given still awaiting transport    1629 [place] here, Midline removed, pt transferred to stretcher x4 assist,  at bedside gathered belongings

## 2022-02-10 NOTE — PLAN OF CARE
Pt to discharge to rehab today, pain management, worked with therapy was up in chair, no falls  Problem: Adult Inpatient Plan of Care  Goal: Plan of Care Review  Outcome: Met  Goal: Patient-Specific Goal (Individualized)  Outcome: Met  Goal: Absence of Hospital-Acquired Illness or Injury  Outcome: Met  Goal: Optimal Comfort and Wellbeing  Outcome: Met  Goal: Readiness for Transition of Care  Outcome: Met     Problem: Diabetes Comorbidity  Goal: Blood Glucose Level Within Targeted Range  Outcome: Met     Problem: Adjustment to Illness (Sepsis/Septic Shock)  Goal: Optimal Coping  Outcome: Met     Problem: Bleeding (Sepsis/Septic Shock)  Goal: Absence of Bleeding  Outcome: Met     Problem: Glycemic Control Impaired (Sepsis/Septic Shock)  Goal: Blood Glucose Level Within Desired Range  Outcome: Met     Problem: Infection Progression (Sepsis/Septic Shock)  Goal: Absence of Infection Signs and Symptoms  Outcome: Met     Problem: Nutrition Impaired (Sepsis/Septic Shock)  Goal: Optimal Nutrition Intake  Outcome: Met     Problem: Fluid and Electrolyte Imbalance (Acute Kidney Injury/Impairment)  Goal: Fluid and Electrolyte Balance  Outcome: Met     Problem: Oral Intake Inadequate (Acute Kidney Injury/Impairment)  Goal: Optimal Nutrition Intake  Outcome: Met     Problem: Renal Function Impairment (Acute Kidney Injury/Impairment)  Goal: Effective Renal Function  Outcome: Met     Problem: Infection  Goal: Absence of Infection Signs and Symptoms  Outcome: Met     Problem: Impaired Wound Healing  Goal: Optimal Wound Healing  Outcome: Met     Problem: Skin Injury Risk Increased  Goal: Skin Health and Integrity  Outcome: Met     Problem: Fall Injury Risk  Goal: Absence of Fall and Fall-Related Injury  Outcome: Met

## 2022-02-10 NOTE — DISCHARGE SUMMARY
"[facility] Medicine  Discharge Summary      Patient Name: Suyapa Connelly  MRN: [id number]  Patient Class: IP- Inpatient  Admission Date: 1/31/2022  Hospital Length of Stay: 9 days  Discharge Date and Time:  02/10/2022 4:42 PM  Attending Physician: Jacinto Eugene MD   Discharging Provider: Dominik Manley MD  Primary Care Provider: Magen Christensen MD  [facility] Medicine Team: [facility] 2 Dominik Manley MD    HPI:   Suyapa Connelly is a 57 y/o female with PMHx CKD3, DMII, GERD, HLD, HTN, osteomyelitis, PVD, depression, and anxiety presents to ED with  for concerns of increased confusion from her rehab facility.  at bedside is concern for progressively worsening appearance of RLE wound, along with continued drainage, and worsening mental status of his wife which has progressed through this week. Patient emotionally labile and responds yes or no to most questions. She reports increased pain in her R leg with nausea over the past week. She also reports constipation without abdominal pain. She denies dysuria. Patient denies fevers, chills, vomiting, chest pain, cough, and shortness of breath.     Patient underwent right BKA by Dr. Rojas on 12/28 then discharged to rehab facility on 1/10. Since then she has recurrent hospital visits for the wound, requiring revisions and debridements; the last resulted in a pseudomonas positive wound culture resistant to cefepime 1/20. [facility] RN stated that patient was "very confused this morning and more lethargic than normal." Per rehab, pt is normally AOx3 and able to follow commands. Rehab states they were concerned for sepsis due to elevated WBC and ROSLYN. Per , her last known normal was 1 weeks ago.    She was admitted to hospital medicine for severe sepsis.       * No surgery found *      Hospital Course:   Patient was admitted to hospital 1/31 for AMS and concern about worsening appearance of RLE wound. Patient was started " on antibiotics (cefepime, vancomycin & azithromycin), blood, wound, resp & urine samples were collected for culture due to concern for sepsis and ID and Gen Surg (s/p BKA on 12/28) were consulted. Pregabalin and mirtazapine withheld due to myoclonus, likely 2/2 to ROSLYN leading to increased serum concentrations. Patient's mental status slowly improving throughout hospitalization, likely AMS due to both pregabalin intox and sepsis. Azithromycin and vanc discontinued and continuing with meropenem. ID consulted and meropenem 7day course completed during admission. Holding pregabalin and mirtazapine due to return of myoclonus and AMS. Patient can re address should pain worsen with PCP. Nephro consulted for persistent ROSLYN (Cr between 1.5-1.7), states this is new baseline for CKD, but Cr has dropped to 1.2 on 2/10. Right BKA wound healing well. Medically ready for discharge to rehab.       Goals of Care Treatment Preferences:  Code Status: Full Code      Consults:   Consults (From admission, onward)        Status Ordering Provider     Inpatient virtual consult to Hospital Medicine  Once        Provider:  (Not yet assigned)    Completed MASLOV, RADHA     Inpatient consult to Nephrology  Once        Provider:  (Not yet assigned)    Completed MASLOV RADHA     Inpatient consult to Physical Medicine Rehab  Once        Provider:  (Not yet assigned)    Completed MASCARIEV RADHA     Inpatient consult to Registered Dietitian/Nutritionist  Once        Provider:  (Not yet assigned)    Completed MASLOV, RADHA     Inpatient consult to General Surgery  Once        Provider:  (Not yet assigned)    Completed MASLOV RADHA     Inpatient consult to Infectious Diseases  Once        Provider:  (Not yet assigned)    Completed CARLYLE RADHA          Impaired functional mobility and endurance  PT/OT consulted, recommend returning to Rehab, awaiting acceptance    Chronic anemia    Chronic disease - MCV & MCH normal; denies acute blood loss, no  indication for transfusion at this time.   Elevated TIBC, ferritin  Decreased transferrin    - Plan to transfuse if <7 with active bleeding    Type 2 diabetes mellitus with diabetic nephropathy, with long-term current use of insulin  Lab Results   Component Value Date    HGBA1C 9.6 (H) 12/03/2021     Plan  - Regular diet due to patient request  - MDSSI PRN with POCT glu TIDWM + QHS  - detemir 10 QD  - Aspart 5 TIDWM  - increase basal and add bolus regimen per requirements  - Consider reducing insulin dosing if Cr continues to climb due to possible poor insulin clearance      Acute blood loss anemia  Patient has Hgb of 6.6 on admission  Stable     Plan  - transfuse 1U pRBC  - Transfuse another 1u pRBC on 2/4.   - Anemia workup with iron, TIBC, b12 panel and FOBT. No apparent sources of bleeding.  - discontinue apixaban, and start xarelto 2.5mg BID + plavix 75 mg  - CBC daily, transfuse Hgb < 7      Mixed hyperlipidemia  Patient on atorvastatin 80mg at home    Plan  - continue home med     CAROLIN (generalized anxiety disorder)  Patient on melatonin, remeron, zoloft     Plan  - restart zoloft 50mg  - PRN melatonin  - Discontinue remeron 2/2 to Urinary retention and myoclonus      Essential hypertension  History of hypertension, without home meds currently.    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] 01/31/2022 Yes    S/P BKA (below knee amputation) unilateral, right [Z89.511] 12/30/2021 Not Applicable    Hypoalbuminemia [E88.09]  06/19/2021 Yes    Impaired functional mobility and endurance [Z74.09] 05/06/2021 Yes    Chronic anemia [D64.9] 03/23/2021 Yes    Type 2 diabetes mellitus with diabetic nephropathy, with long-term current use of insulin [E11.21, Z79.4] 02/24/2021 Not Applicable    Acute blood loss anemia [D62] 01/27/2020 Yes    Mixed hyperlipidemia [E78.2] 12/13/2019 Yes     Chronic    CAROLIN (generalized anxiety disorder) [F41.1] 05/07/2018 Yes     Chronic    Essential hypertension [I10] 05/05/2018 Yes    Moderate malnutrition [E44.0] 05/05/2018 Yes      Problems Resolved During this Admission:    Diagnosis Date Noted Date Resolved POA    Amputation above knee [S78.119A] 06/08/2021 01/31/2022 Yes       Discharged Condition: stable    Disposition: Rehab Facility    Follow Up:    Patient Instructions:      Ambulatory referral/consult to Hematology / Oncology   Standing Status: Future   Referral Priority: Routine Referral Type: Consultation   Referral Reason: Specialty Services Required   Requested Specialty: Hematology and Oncology     Ambulatory referral/consult to Nephrology   Standing Status: Future   Referral Priority: Routine Referral Type: Consultation   Referral Reason: Specialty Services Required   Requested Specialty: Nephrology   Number of Visits Requested: 1     Ambulatory referral/consult to Wound Clinic   Standing Status: Future   Referral Priority: Routine Referral Type: Consultation   Referral Reason: Specialty Services Required   Requested Specialty: Wound Care   Number of Visits Requested: 1       Significant Diagnostic Studies: Labs:   CMP   Recent Labs   Lab 02/09/22  0225 02/10/22  0430    139   K 4.5 4.2    108   CO2 24 27   * 89   BUN 61* 46*   CREATININE 1.4 1.2   CALCIUM 9.5 10.0   PROT 6.8 7.4   ALBUMIN 1.7* 1.9*   BILITOT 0.3 0.2   ALKPHOS 91 115   AST 28 82*   ALT 15 39   ANIONGAP 8 4*   ESTGFRAFRICA 48.8* 58.8*   EGFRNONAA 42.3* 51.0*   , CBC   Recent Labs   Lab 02/09/22 0225  02/09/22  0225 02/10/22  0430   WBC 8.50  --  8.41   HGB 7.6*  --  8.0*   HCT 25.0*   < > 26.3*     --  415    < > = values in this interval not displayed.    and All labs within the past 24 hours have been reviewed  Microbiology:   Blood Culture   Lab Results   Component Value Date    LABBLOO No growth after 5 days. 01/31/2022    and Wound Culture: positive      Pending Diagnostic Studies:     None         Medications:  Reconciled Home Medications:      Medication List      START taking these medications    carvediloL 3.125 MG tablet  Commonly known as: COREG  Take 1 tablet (3.125 mg total) by mouth 2 (two) times daily.     ondansetron 8 MG Tbdl  Commonly known as: ZOFRAN-ODT  Dissolve 1 tablet (8 mg total) by mouth every 8 (eight) hours as needed.     XARELTO 2.5 mg Tab  Generic drug: rivaroxaban  Take 1 tablet (2.5 mg total) by mouth 2 (two) times daily with meals.        CHANGE how you take these medications    furosemide 40 MG tablet  Commonly known as: LASIX  Take 1 tablet (40 mg total) by mouth once daily. Please hold until follow up with PCP  What changed: additional instructions     sodium bicarbonate 650 MG tablet  Take 1 tablet (650 mg total) by mouth 2 (two) times daily. Please hold until follow up with Primary Care Provider  What changed: additional instructions        CONTINUE taking these medications    acetaminophen 500 MG tablet  Commonly known as: TYLENOL  Take 2 tablets (1,000 mg total) by mouth every 8 (eight) hours as needed for Pain.     allopurinoL 100 MG tablet  Commonly known as: ZYLOPRIM  Take 1 tablet (100 mg total) by mouth once daily.     atorvastatin 80 MG tablet  Commonly known as: LIPITOR  Take 1 tablet (80 mg total) by mouth every evening.     clopidogreL 75 mg tablet  Commonly known as: PLAVIX  Take 1 tablet (75 mg total) by mouth once daily.     DEXCOM G6  Misc  Generic drug: blood-glucose meter,continuous  Use to check blood sugars 4 times/day     DEXCOM G6 SENSOR  Radha  Generic drug: blood-glucose sensor  Apply one sensor to skin every 10 days     DEXCOM G6 TRANSMITTER Radha  Generic drug: blood-glucose transmitter  Replace transmitter every 3 months to use with dexcom sensor     HYDROcodone-acetaminophen 5-325 mg per tablet  Commonly known as: NORCO  Take 1 tablet by mouth every 4 (four) hours as needed for Pain.     insulin aspart U-100 100 unit/mL (3 mL) Inpn pen  Commonly known as: NovoLOG Flexpen U-100 Insulin  Inject 5 Units into the skin 3 (three) times daily with meals. If not eating, ok to hold     insulin detemir U-100 100 unit/mL (3 mL) Inpn pen  Commonly known as: Levemir FLEXTOUCH  Inject 12 Units into the skin every evening.     magnesium oxide 400 mg (241.3 mg magnesium) tablet  Commonly known as: MAG-OX  Take 1 tablet (400 mg total) by mouth 2 (two) times daily.     melatonin 3 mg tablet  Commonly known as: MELATIN  Take 2 tablets (6 mg total) by mouth nightly as needed for Insomnia.     mirtazapine 7.5 MG Tab  Commonly known as: REMERON  Take 2 tablets (15 mg total) by mouth nightly.     ONE-A-DAY WOMEN'S 50 PLUS ORAL  Take 1 tablet by mouth Daily.     oxyCODONE-acetaminophen  mg per tablet  Commonly known as: PERCOCET  Take 1 tablet by mouth every 6 (six) hours as needed.     sertraline 50 MG tablet  Commonly known as: ZOLOFT  Take 1 tablet (50 mg total) by mouth once daily.        STOP taking these medications    apixaban 5 mg Tab  Commonly known as: ELIQUIS     MEROPENEM 1 G/100 ML NS (READY TO MIX SYSTEM)     pregabalin 50 MG capsule  Commonly known as: LYRICA            Indwelling Lines/Drains at time of discharge:   Lines/Drains/Airways     None                 Time spent on the discharge of patient: 35 minutes         Dominik Manley MD  Department of Hospital Medicine  [facility]

## 2022-02-10 NOTE — PROGRESS NOTES
"[facility] Medicine  Progress Note    Patient Name: Suyapa Connelly  MRN: [id number]  Patient Class: IP- Inpatient   Admission Date: 1/31/2022  Length of Stay: 9 days  Attending Physician: Jacinto Eugene MD  Primary Care Provider: Magen Christensen MD        Subjective:     Principal Problem:Severe sepsis        HPI:  Suyapa Connelly is a 55 y/o female with PMHx CKD3, DMII, GERD, HLD, HTN, osteomyelitis, PVD, depression, and anxiety presents to ED with  for concerns of increased confusion from her rehab facility.  at bedside is concern for progressively worsening appearance of RLE wound, along with continued drainage, and worsening mental status of his wife which has progressed through this week. Patient emotionally labile and responds yes or no to most questions. She reports increased pain in her R leg with nausea over the past week. She also reports constipation without abdominal pain. She denies dysuria. Patient denies fevers, chills, vomiting, chest pain, cough, and shortness of breath.     Patient underwent right BKA by Dr. Rojas on 12/28 then discharged to rehab facility on 1/10. Since then she has recurrent hospital visits for the wound, requiring revisions and debridements; the last resulted in a pseudomonas positive wound culture resistant to cefepime 1/20. [facility] RN stated that patient was "very confused this morning and more lethargic than normal." Per rehab, pt is normally AOx3 and able to follow commands. Rehab states they were concerned for sepsis due to elevated WBC and ROSLYN. Per , her last known normal was 1 weeks ago.    She was admitted to hospital medicine for severe sepsis.       Overview/Hospital Course:  Patient was admitted to hospital 1/31 for AMS and concern about worsening appearance of RLE wound. Patient was started on antibiotics (cefepime, vancomycin & azithromycin), blood, wound, resp & urine samples were collected for culture due to " concern for sepsis and ID and Gen Surg (s/p BKA on 12/28) were consulted. Pregabalin and mirtazapine withheld due to myoclonus, likely 2/2 to ROSLYN leading to increased serum concentrations. Patient's mental status slowly improving throughout hospitalization, likely AMS due to both pregabalin intox and sepsis. Azithromycin and vanc discontinued and continuing with meropenem. ID consulted and meropenem 7day course completed during admission. Holding pregabalin and mirtazapine due to return of myoclonus and AMS. Patient can re address should pain worsen with PCP. Nephro consulted for persistent ROSLYN (Cr between 1.5-1.7), states this is new baseline for CKD, but Cr has dropped to 1.2 on 2/10. Right BKA wound healing well. Medically ready for discharge to rehab.      Interval History: Symptoms:  Stable.   Diet:  Adequate intake.    Activity level:  Impaired due to weakness. Working with OT/PT  Pain:  Pain minimal.      Review of Systems   Constitutional: Negative for chills and fever.   HENT: Negative for congestion and rhinorrhea.    Eyes: Negative for pain and visual disturbance.   Respiratory: Negative for chest tightness and shortness of breath.    Cardiovascular: Negative for chest pain, palpitations and leg swelling.   Gastrointestinal: Negative for abdominal distention, abdominal pain, constipation, diarrhea, nausea and vomiting.   Genitourinary: Negative for difficulty urinating and dysuria.   Musculoskeletal: Negative for arthralgias, back pain and neck pain.   Skin: Positive for wound (improved). Negative for rash.   Neurological: Negative for dizziness and headaches.   Psychiatric/Behavioral: Negative for confusion and sleep disturbance.     Objective:     Vital Signs (Most Recent):  Temp: 98.1 °F (36.7 °C) (02/10/22 1055)  Pulse: 88 (02/10/22 1055)  Resp: 16 (02/10/22 1416)  BP: (!) 146/78 (02/10/22 1055)  SpO2: 95 % (02/10/22 1055) Vital Signs (24h Range):  Temp:  [98.1 °F (36.7 °C)-98.5 °F (36.9 °C)] 98.1 °F  (36.7 °C)  Pulse:  [86-89] 88  Resp:  [16-18] 16  SpO2:  [94 %-97 %] 95 %  BP: (122-149)/(58-78) 146/78     Weight: 67.6 kg (149 lb 0.5 oz)  Body mass index is 24.8 kg/m².    Intake/Output Summary (Last 24 hours) at 2/10/2022 1618  Last data filed at 2/9/2022 2000  Gross per 24 hour   Intake 200 ml   Output --   Net 200 ml      Physical Exam  Vitals and nursing note reviewed.   Constitutional:       General: She is not in acute distress.     Appearance: Normal appearance. She is not ill-appearing or diaphoretic.   HENT:      Head: Normocephalic and atraumatic.      Right Ear: External ear normal.      Left Ear: External ear normal.      Nose: Nose normal. No congestion or rhinorrhea.      Mouth/Throat:      Mouth: Mucous membranes are moist.      Pharynx: Oropharynx is clear.   Eyes:      General: No scleral icterus.     Extraocular Movements: Extraocular movements intact.      Pupils: Pupils are equal, round, and reactive to light.   Cardiovascular:      Rate and Rhythm: Normal rate and regular rhythm.      Pulses: Normal pulses.      Heart sounds: Normal heart sounds.   Pulmonary:      Effort: Pulmonary effort is normal.      Breath sounds: Normal breath sounds. No wheezing, rhonchi or rales.   Abdominal:      General: Bowel sounds are normal. There is no distension.      Palpations: Abdomen is soft.      Tenderness: There is no abdominal tenderness. There is no right CVA tenderness or left CVA tenderness.   Musculoskeletal:         General: Normal range of motion.      Cervical back: Normal range of motion and neck supple. No tenderness.      Comments: R. BKA + L. AKA, R.BKA bandaged and can tolerate touch without pain.    Lymphadenopathy:      Cervical: No cervical adenopathy.   Skin:     General: Skin is warm and dry.   Neurological:      Mental Status: She is alert and oriented to person, place, and time.   Psychiatric:         Mood and Affect: Mood normal.         Behavior: Behavior normal.         Thought  Content: Thought content normal.         Judgment: Judgment normal.         Significant Labs:   All pertinent labs within the past 24 hours have been reviewed.  CBC:   Recent Labs   Lab 02/09/22  0225 02/10/22  0430   WBC 8.50 8.41   HGB 7.6* 8.0*   HCT 25.0* 26.3*    415     CMP:   Recent Labs   Lab 02/09/22  0225 02/10/22  0430    139   K 4.5 4.2    108   CO2 24 27   * 89   BUN 61* 46*   CREATININE 1.4 1.2   CALCIUM 9.5 10.0   PROT 6.8 7.4   ALBUMIN 1.7* 1.9*   BILITOT 0.3 0.2   ALKPHOS 91 115   AST 28 82*   ALT 15 39   ANIONGAP 8 4*   EGFRNONAA 42.3* 51.0*     POCT Glucose:   Recent Labs   Lab 02/09/22  2007 02/10/22  0724 02/10/22  1056   POCTGLUCOSE 144* 90 136*       Significant Imaging: I have reviewed all pertinent imaging results/findings within the past 24 hours.           Assessment/Plan:      * Severe sepsis  Ms. Connelly is a 55 yo female s/p r BKA and hx of Pseudomonas infection at the site of surgery presenting with AMS, leukocytosis, and elevated procalc  - likely sepsis due to wound infection/OM  - ESR and CRP slightly elevated  - Procalc of 0.38  -MRI Sacrum and R leg without evidence of OM  - Gen Surg following given repeated wound infection s/p BKA, no surgical intervention, signing off  - blood cx x2, wound cx, resp cx NGTD  - urine cx NGTD to r/o other source     Plan  - wound care consulted, continue to appreciate recs  - Inflammatory markers elevated, confirming likely source is wound  - CBC daily to trend WCC  - Completed meropenem 2g q12 until 2/7 per ID recs        Dermatitis associated with moisture    Wound care following for sacral wound:  - wound evaluation consistent with moisture associated dermatitis.  - wound previously documented as decubitus ulcer of sacral region, unstageable.  - pt is incontinent and wound appears to be related to moisture.  - continue Triad bid/prn cleaning.  - waffle overlay mattress in place. Inflated per RN at bedside.  - pt  encouraged to turn q2h.  - nursing to maintain pressure injury prevention measures.     Myoclonus  Patient has myoclonal jerks on exam  - likely in setting of pregabalin build with ROSLYN  - improving with holding meds    Plan  - Discontinue lyrica 50 mg BID;   - Discontinue mirtazipine  qhs  - follow up with PCP  - neuro checks  - delirium precautions      ROSLYN (acute kidney injury)  HPatient with acute kidney injury likely d/t IVVD/Dehydration Which is currently worsening. Labs reviewed- Renal function/electrolytes with Estimated Creatinine Clearance: 47.7 mL/min (based on SCr of 1.2 mg/dL). according to latest data. Monitor urine output and serial BMP and adjust therapy as needed. Avoid nephrotoxins and renally dose meds for GFR listed above.   - improved with IVF, likely pre renal    Plan  - strict I/Os  - renally dose meds  - avoid nephrotoxic agents  - discontinue mirtazipine and hold lyrica  - Nephro consulted, recs following:   -UA 3+ prot, 6 RBC, 53 WBC  -UPCR 5.6 most likely secondary to diabetic kidney disease  - Cr 1.5-1.7, Nephrology consulted, states this is likely new baseline and to follow up with PCP  -not volume overloaded, no severe acidosis, no severe electrolyte abnormalities, no signs of uremia  -no need for diuresis or IV fluids  -no intervention from a nephrology standpoint    -Resolving, near her baseline.       Altered mental state  Likely in setting of infection  - CTH negative    Plan  - neuro checks  - delirium precautions      S/P BKA (below knee amputation) unilateral, right  Patient had recent BKA at [facility] with recent debridement last week  - patient has active drainage from wound with continued pain and complications    Plan  - Norco 5mg PRN q6 for severe pain  - Wound Care consulted, continue to appreciate recs  - Gen Surg states no surgical intervention recommended at this time.   - ID recommend to continue with meropenem through 2/7, completed    Hypoalbuminemia  - Boost glucose  control TIDWM   - Heme/onc outpatient referral    Impaired functional mobility and endurance  PT/OT consulted, recommend returning to Rehab, awaiting acceptance    Chronic anemia    Chronic disease - MCV & MCH normal; denies acute blood loss, no indication for transfusion at this time.   Elevated TIBC, ferritin  Decreased transferrin    - Plan to transfuse if <7 with active bleeding    Type 2 diabetes mellitus with diabetic nephropathy, with long-term current use of insulin  Lab Results   Component Value Date    HGBA1C 9.6 (H) 12/03/2021     Plan  - Regular diet due to patient request  - MDSSI PRN with POCT glu TIDWM + QHS  - detemir 10 QD  - Aspart 5 TIDWM  - increase basal and add bolus regimen per requirements  - Consider reducing insulin dosing if Cr continues to climb due to possible poor insulin clearance      Acute blood loss anemia  Patient has Hgb of 6.6 on admission  Stable     Plan  - transfuse 1U pRBC  - Transfuse another 1u pRBC on 2/4.   - Anemia workup with iron, TIBC, b12 panel and FOBT. No apparent sources of bleeding.  - discontinue apixaban, and start xarelto 2.5mg BID + plavix 75 mg  - CBC daily, transfuse Hgb < 7      Mixed hyperlipidemia  Patient on atorvastatin 80mg at home    Plan  - continue home med     CAROLIN (generalized anxiety disorder)  Patient on melatonin, remeron, zoloft     Plan  - restart zoloft 50mg  - PRN melatonin  - Discontinue remeron 2/2 to Urinary retention and myoclonus,       Essential hypertension  History of hypertension, without home meds currently.    -restart carvedilol 3.125 bid   - hold home valsartan since ROSLYN, can resume once it is clear new baseline is 1.5-1.7, follow up with PCP  -Q4 vitals to monitor and titrate as needed    Moderate malnutrition  Boost TIDWM      VTE Risk Mitigation (From admission, onward)         Ordered     IP VTE HIGH RISK PATIENT  Once         01/31/22 1930                Discharge Planning   DEVAN: 2/10/2022     Code Status: Full Code   Is  the patient medically ready for discharge?: Yes    Reason for patient still in hospital (select all that apply): Pending disposition  Discharge Plan A: Rehab   Discharge Delays: None known at this time              Dominik Manley MD  Department of Hospital Medicine   [facility] Surg

## 2022-02-10 NOTE — ASSESSMENT & PLAN NOTE
Patient had recent BKA at [facility] with recent debridement last week  - patient has active drainage from wound with continued pain and complications    Plan  - Norco 5mg PRN q6 for severe pain  - Wound Care consulted, continue to appreciate recs  - Gen Surg states no surgical intervention recommended at this time.   - ID recommend to continue with meropenem through 2/7, completed

## 2022-02-10 NOTE — SUBJECTIVE & OBJECTIVE
Interval History: Symptoms:  Stable.   Diet:  Adequate intake.    Activity level:  Impaired due to weakness. Working with OT/PT  Pain:  Pain minimal.      Review of Systems   Constitutional: Negative for chills and fever.   HENT: Negative for congestion and rhinorrhea.    Eyes: Negative for pain and visual disturbance.   Respiratory: Negative for chest tightness and shortness of breath.    Cardiovascular: Negative for chest pain, palpitations and leg swelling.   Gastrointestinal: Negative for abdominal distention, abdominal pain, constipation, diarrhea, nausea and vomiting.   Genitourinary: Negative for difficulty urinating and dysuria.   Musculoskeletal: Negative for arthralgias, back pain and neck pain.   Skin: Positive for wound (improved). Negative for rash.   Neurological: Negative for dizziness and headaches.   Psychiatric/Behavioral: Negative for confusion and sleep disturbance.     Objective:     Vital Signs (Most Recent):  Temp: 98.1 °F (36.7 °C) (02/10/22 1055)  Pulse: 88 (02/10/22 1055)  Resp: 16 (02/10/22 1416)  BP: (!) 146/78 (02/10/22 1055)  SpO2: 95 % (02/10/22 1055) Vital Signs (24h Range):  Temp:  [98.1 °F (36.7 °C)-98.5 °F (36.9 °C)] 98.1 °F (36.7 °C)  Pulse:  [86-89] 88  Resp:  [16-18] 16  SpO2:  [94 %-97 %] 95 %  BP: (122-149)/(58-78) 146/78     Weight: 67.6 kg (149 lb 0.5 oz)  Body mass index is 24.8 kg/m².    Intake/Output Summary (Last 24 hours) at 2/10/2022 1618  Last data filed at 2/9/2022 2000  Gross per 24 hour   Intake 200 ml   Output --   Net 200 ml      Physical Exam  Vitals and nursing note reviewed.   Constitutional:       General: She is not in acute distress.     Appearance: Normal appearance. She is not ill-appearing or diaphoretic.   HENT:      Head: Normocephalic and atraumatic.      Right Ear: External ear normal.      Left Ear: External ear normal.      Nose: Nose normal. No congestion or rhinorrhea.      Mouth/Throat:      Mouth: Mucous membranes are moist.      Pharynx:  Oropharynx is clear.   Eyes:      General: No scleral icterus.     Extraocular Movements: Extraocular movements intact.      Pupils: Pupils are equal, round, and reactive to light.   Cardiovascular:      Rate and Rhythm: Normal rate and regular rhythm.      Pulses: Normal pulses.      Heart sounds: Normal heart sounds.   Pulmonary:      Effort: Pulmonary effort is normal.      Breath sounds: Normal breath sounds. No wheezing, rhonchi or rales.   Abdominal:      General: Bowel sounds are normal. There is no distension.      Palpations: Abdomen is soft.      Tenderness: There is no abdominal tenderness. There is no right CVA tenderness or left CVA tenderness.   Musculoskeletal:         General: Normal range of motion.      Cervical back: Normal range of motion and neck supple. No tenderness.      Comments: R. BKA + L. AKA, R.BKA bandaged and can tolerate touch without pain.    Lymphadenopathy:      Cervical: No cervical adenopathy.   Skin:     General: Skin is warm and dry.   Neurological:      Mental Status: She is alert and oriented to person, place, and time.   Psychiatric:         Mood and Affect: Mood normal.         Behavior: Behavior normal.         Thought Content: Thought content normal.         Judgment: Judgment normal.         Significant Labs:   All pertinent labs within the past 24 hours have been reviewed.  CBC:   Recent Labs   Lab 02/09/22  0225 02/10/22  0430   WBC 8.50 8.41   HGB 7.6* 8.0*   HCT 25.0* 26.3*    415     CMP:   Recent Labs   Lab 02/09/22  0225 02/10/22  0430    139   K 4.5 4.2    108   CO2 24 27   * 89   BUN 61* 46*   CREATININE 1.4 1.2   CALCIUM 9.5 10.0   PROT 6.8 7.4   ALBUMIN 1.7* 1.9*   BILITOT 0.3 0.2   ALKPHOS 91 115   AST 28 82*   ALT 15 39   ANIONGAP 8 4*   EGFRNONAA 42.3* 51.0*     POCT Glucose:   Recent Labs   Lab 02/09/22  2007 02/10/22  0724 02/10/22  1056   POCTGLUCOSE 144* 90 136*       Significant Imaging: I have reviewed all pertinent imaging  results/findings within the past 24 hours.

## 2022-02-10 NOTE — ASSESSMENT & PLAN NOTE
Patient on melatonin, remeron, zoloft     Plan  - restart zoloft 50mg  - PRN melatonin  - Discontinue remeron 2/2 to Urinary retention and myoclonus

## 2022-02-10 NOTE — PLAN OF CARE
Left another  for [employer] , Lucretia Mujica ([phone], ext. [phone]) to see if they have authorized rehab.     Also emailed Ya Fleming, Loly Andrade and LETICIA Barney with [employer] to see if they had any updates on auth.    11:07 AM  Received confirmation from Santiago at [facility] that they finally received auth from [employer]. Waiting on instruction to set up transport.    12:33 PM  Scheduled transport through [facility] for 3:30 PM, to [facility] located at [address] [place] [id number]      Tammy Will, [facility]  [phone]

## 2022-02-11 ENCOUNTER — TELEPHONE (OUTPATIENT)
Dept: PHYSICAL MEDICINE AND REHAB | Facility: CLINIC | Age: 57
End: 2022-02-11
Payer: MEDICARE

## 2022-02-11 NOTE — TELEPHONE ENCOUNTER
----- Message from Teddy Lockett sent at 2/11/2022 11:09 AM CST -----  Regarding: Pt's  would like a arya back      The Pt's (Randell Diaz) is a Pt of Dr. Ritter and would like a call back about the Pt needing a motorized chair also.  The caller states that the Pt just got her 2nd leg amputated and is at the [facility] on [facility] for the next 5 days.    The caller was requesting a call back to have info on how to go about getting the power chair for when the Pt leaves the skilled nursing facility.    Ph # [phone] (Randell Diaz- Pt's )

## 2022-02-11 NOTE — TELEPHONE ENCOUNTER
----- Message from Teddy Lockett sent at 2/11/2022 11:09 AM CST -----  Regarding: Pt's  would like a arya back      The Pt's (Randell Diaz) is a Pt of Dr. Ritter and would like a call back about the Pt needing a motorized chair also.  The caller states that the Pt just got her 2nd leg amputated and is at the [facility] on [facility] for the next 5 days.    The caller was requesting a call back to have info on how to go about getting the power chair for when the Pt leaves the skilled nursing facility.    Ph # [phone] (Randell Diaz- Pt's )

## 2022-02-15 LAB
COMMENT: NORMAL
FINAL PATHOLOGIC DIAGNOSIS: NORMAL
GROSS: NORMAL
Lab: NORMAL

## 2022-03-11 ENCOUNTER — HOSPITAL ENCOUNTER (INPATIENT)
Facility: HOSPITAL | Age: 57
LOS: 2 days | Discharge: HOME | DRG: 565 | End: 2022-03-16
Attending: EMERGENCY MEDICINE | Admitting: STUDENT IN AN ORGANIZED HEALTH CARE EDUCATION/TRAINING PROGRAM
Payer: MEDICARE

## 2022-03-11 DIAGNOSIS — E11.21 TYPE 2 DIABETES MELLITUS WITH DIABETIC NEPHROPATHY, WITH LONG-TERM CURRENT USE OF INSULIN: ICD-10-CM

## 2022-03-11 DIAGNOSIS — Z89.612 STATUS POST ABOVE KNEE AMPUTATION, LEFT: ICD-10-CM

## 2022-03-11 DIAGNOSIS — T87.43 INFECTION OF AMPUTATION STUMP OF RIGHT LOWER EXTREMITY: Primary | ICD-10-CM

## 2022-03-11 DIAGNOSIS — R55 SYNCOPE: ICD-10-CM

## 2022-03-11 DIAGNOSIS — Z79.4 TYPE 2 DIABETES MELLITUS WITH DIABETIC NEPHROPATHY, WITH LONG-TERM CURRENT USE OF INSULIN: ICD-10-CM

## 2022-03-11 DIAGNOSIS — Z89.511 S/P BKA (BELOW KNEE AMPUTATION) UNILATERAL, RIGHT: ICD-10-CM

## 2022-03-11 DIAGNOSIS — M86.9 OSTEOMYELITIS: ICD-10-CM

## 2022-03-11 DIAGNOSIS — F43.23 ADJUSTMENT DISORDER WITH MIXED ANXIETY AND DEPRESSED MOOD: ICD-10-CM

## 2022-03-11 DIAGNOSIS — R07.9 CHEST PAIN: ICD-10-CM

## 2022-03-11 LAB
ALBUMIN SERPL BCP-MCNC: 2.6 G/DL (ref 3.5–5.2)
ALP SERPL-CCNC: 126 U/L (ref 55–135)
ALT SERPL W/O P-5'-P-CCNC: 6 U/L (ref 10–44)
ANION GAP SERPL CALC-SCNC: 13 MMOL/L (ref 8–16)
AST SERPL-CCNC: 11 U/L (ref 10–40)
BILIRUB SERPL-MCNC: 0.2 MG/DL (ref 0.1–1)
BUN SERPL-MCNC: 61 MG/DL (ref 6–20)
CALCIUM SERPL-MCNC: 9.1 MG/DL (ref 8.7–10.5)
CHLORIDE SERPL-SCNC: 105 MMOL/L (ref 95–110)
CO2 SERPL-SCNC: 18 MMOL/L (ref 23–29)
CREAT SERPL-MCNC: 2.1 MG/DL (ref 0.5–1.4)
CRP SERPL-MCNC: 24.3 MG/L (ref 0–8.2)
ERYTHROCYTE [SEDIMENTATION RATE] IN BLOOD BY WESTERGREN METHOD: 95 MM/HR (ref 0–36)
EST. GFR  (AFRICAN AMERICAN): 29.9 ML/MIN/1.73 M^2
EST. GFR  (NON AFRICAN AMERICAN): 25.9 ML/MIN/1.73 M^2
GLUCOSE SERPL-MCNC: 220 MG/DL (ref 70–110)
LACTATE SERPL-SCNC: 1.2 MMOL/L (ref 0.5–2.2)
POTASSIUM SERPL-SCNC: 5.1 MMOL/L (ref 3.5–5.1)
PROT SERPL-MCNC: 8.2 G/DL (ref 6–8.4)
SODIUM SERPL-SCNC: 136 MMOL/L (ref 136–145)
TROPONIN I SERPL DL<=0.01 NG/ML-MCNC: 0.01 NG/ML (ref 0–0.03)

## 2022-03-11 PROCEDURE — 99285 EMERGENCY DEPT VISIT HI MDM: CPT | Mod: CS,,, | Performed by: EMERGENCY MEDICINE

## 2022-03-11 PROCEDURE — 84484 ASSAY OF TROPONIN QUANT: CPT | Performed by: STUDENT IN AN ORGANIZED HEALTH CARE EDUCATION/TRAINING PROGRAM

## 2022-03-11 PROCEDURE — 83880 ASSAY OF NATRIURETIC PEPTIDE: CPT | Performed by: STUDENT IN AN ORGANIZED HEALTH CARE EDUCATION/TRAINING PROGRAM

## 2022-03-11 PROCEDURE — 96375 TX/PRO/DX INJ NEW DRUG ADDON: CPT

## 2022-03-11 PROCEDURE — 93010 ELECTROCARDIOGRAM REPORT: CPT | Mod: ,,, | Performed by: INTERNAL MEDICINE

## 2022-03-11 PROCEDURE — 93010 EKG 12-LEAD: ICD-10-PCS | Mod: ,,, | Performed by: INTERNAL MEDICINE

## 2022-03-11 PROCEDURE — 85652 RBC SED RATE AUTOMATED: CPT | Performed by: STUDENT IN AN ORGANIZED HEALTH CARE EDUCATION/TRAINING PROGRAM

## 2022-03-11 PROCEDURE — 99285 PR EMERGENCY DEPT VISIT,LEVEL V: ICD-10-PCS | Mod: CS,,, | Performed by: EMERGENCY MEDICINE

## 2022-03-11 PROCEDURE — 86140 C-REACTIVE PROTEIN: CPT | Performed by: STUDENT IN AN ORGANIZED HEALTH CARE EDUCATION/TRAINING PROGRAM

## 2022-03-11 PROCEDURE — 93005 ELECTROCARDIOGRAM TRACING: CPT

## 2022-03-11 PROCEDURE — 85025 COMPLETE CBC W/AUTO DIFF WBC: CPT | Performed by: STUDENT IN AN ORGANIZED HEALTH CARE EDUCATION/TRAINING PROGRAM

## 2022-03-11 PROCEDURE — 83605 ASSAY OF LACTIC ACID: CPT | Performed by: STUDENT IN AN ORGANIZED HEALTH CARE EDUCATION/TRAINING PROGRAM

## 2022-03-11 PROCEDURE — 63600175 PHARM REV CODE 636 W HCPCS: Performed by: STUDENT IN AN ORGANIZED HEALTH CARE EDUCATION/TRAINING PROGRAM

## 2022-03-11 PROCEDURE — 99285 EMERGENCY DEPT VISIT HI MDM: CPT | Mod: 25

## 2022-03-11 PROCEDURE — 80053 COMPREHEN METABOLIC PANEL: CPT | Performed by: STUDENT IN AN ORGANIZED HEALTH CARE EDUCATION/TRAINING PROGRAM

## 2022-03-11 RX ORDER — ASPIRIN 325 MG
325 TABLET ORAL
Status: DISCONTINUED | OUTPATIENT
Start: 2022-03-11 | End: 2022-03-11

## 2022-03-11 RX ORDER — ONDANSETRON 2 MG/ML
4 INJECTION INTRAMUSCULAR; INTRAVENOUS
Status: COMPLETED | OUTPATIENT
Start: 2022-03-11 | End: 2022-03-11

## 2022-03-11 RX ADMIN — ONDANSETRON 4 MG: 2 INJECTION INTRAMUSCULAR; INTRAVENOUS at 10:03

## 2022-03-12 PROBLEM — T87.43 INFECTION OF AMPUTATION STUMP OF RIGHT LOWER EXTREMITY: Status: ACTIVE | Noted: 2022-03-12

## 2022-03-12 LAB
ALBUMIN SERPL BCP-MCNC: 2 G/DL (ref 3.5–5.2)
ALP SERPL-CCNC: 89 U/L (ref 55–135)
ALT SERPL W/O P-5'-P-CCNC: 6 U/L (ref 10–44)
ANION GAP SERPL CALC-SCNC: 10 MMOL/L (ref 8–16)
AST SERPL-CCNC: 10 U/L (ref 10–40)
BACTERIA #/AREA URNS AUTO: ABNORMAL /HPF
BASOPHILS # BLD AUTO: 0.03 K/UL (ref 0–0.2)
BASOPHILS # BLD AUTO: 0.07 K/UL (ref 0–0.2)
BASOPHILS NFR BLD: 0.3 % (ref 0–1.9)
BASOPHILS NFR BLD: 0.7 % (ref 0–1.9)
BILIRUB SERPL-MCNC: 0.2 MG/DL (ref 0.1–1)
BILIRUB UR QL STRIP: NEGATIVE
BNP SERPL-MCNC: 51 PG/ML (ref 0–99)
BUN SERPL-MCNC: 66 MG/DL (ref 6–20)
CALCIUM SERPL-MCNC: 8.8 MG/DL (ref 8.7–10.5)
CHLORIDE SERPL-SCNC: 109 MMOL/L (ref 95–110)
CLARITY UR REFRACT.AUTO: ABNORMAL
CO2 SERPL-SCNC: 17 MMOL/L (ref 23–29)
COLOR UR AUTO: YELLOW
CREAT SERPL-MCNC: 2.1 MG/DL (ref 0.5–1.4)
CTP QC/QA: YES
D DIMER PPP IA.FEU-MCNC: 0.62 MG/L FEU
DIFFERENTIAL METHOD: ABNORMAL
DIFFERENTIAL METHOD: ABNORMAL
EOSINOPHIL # BLD AUTO: 0.2 K/UL (ref 0–0.5)
EOSINOPHIL # BLD AUTO: 0.3 K/UL (ref 0–0.5)
EOSINOPHIL NFR BLD: 2.8 % (ref 0–8)
EOSINOPHIL NFR BLD: 3.2 % (ref 0–8)
ERYTHROCYTE [DISTWIDTH] IN BLOOD BY AUTOMATED COUNT: 16.7 % (ref 11.5–14.5)
ERYTHROCYTE [DISTWIDTH] IN BLOOD BY AUTOMATED COUNT: 17.1 % (ref 11.5–14.5)
EST. GFR  (AFRICAN AMERICAN): 29.9 ML/MIN/1.73 M^2
EST. GFR  (NON AFRICAN AMERICAN): 25.9 ML/MIN/1.73 M^2
GLUCOSE SERPL-MCNC: 94 MG/DL (ref 70–110)
GLUCOSE UR QL STRIP: NEGATIVE
HCT VFR BLD AUTO: 27.5 % (ref 37–48.5)
HCT VFR BLD AUTO: 32.8 % (ref 37–48.5)
HGB BLD-MCNC: 10.3 G/DL (ref 12–16)
HGB BLD-MCNC: 8.4 G/DL (ref 12–16)
HGB UR QL STRIP: ABNORMAL
HYALINE CASTS UR QL AUTO: 0 /LPF
IMM GRANULOCYTES # BLD AUTO: 0.02 K/UL (ref 0–0.04)
IMM GRANULOCYTES # BLD AUTO: 0.03 K/UL (ref 0–0.04)
IMM GRANULOCYTES NFR BLD AUTO: 0.2 % (ref 0–0.5)
IMM GRANULOCYTES NFR BLD AUTO: 0.3 % (ref 0–0.5)
KETONES UR QL STRIP: NEGATIVE
LEUKOCYTE ESTERASE UR QL STRIP: ABNORMAL
LYMPHOCYTES # BLD AUTO: 1.6 K/UL (ref 1–4.8)
LYMPHOCYTES # BLD AUTO: 2.5 K/UL (ref 1–4.8)
LYMPHOCYTES NFR BLD: 17.9 % (ref 18–48)
LYMPHOCYTES NFR BLD: 24.9 % (ref 18–48)
MAGNESIUM SERPL-MCNC: 2.1 MG/DL (ref 1.6–2.6)
MCH RBC QN AUTO: 29.9 PG (ref 27–31)
MCH RBC QN AUTO: 30.3 PG (ref 27–31)
MCHC RBC AUTO-ENTMCNC: 30.5 G/DL (ref 32–36)
MCHC RBC AUTO-ENTMCNC: 31.4 G/DL (ref 32–36)
MCV RBC AUTO: 95 FL (ref 82–98)
MCV RBC AUTO: 99 FL (ref 82–98)
MICROSCOPIC COMMENT: ABNORMAL
MONOCYTES # BLD AUTO: 0.7 K/UL (ref 0.3–1)
MONOCYTES # BLD AUTO: 0.8 K/UL (ref 0.3–1)
MONOCYTES NFR BLD: 7.5 % (ref 4–15)
MONOCYTES NFR BLD: 8.4 % (ref 4–15)
NEUTROPHILS # BLD AUTO: 6.1 K/UL (ref 1.8–7.7)
NEUTROPHILS # BLD AUTO: 6.3 K/UL (ref 1.8–7.7)
NEUTROPHILS NFR BLD: 63.5 % (ref 38–73)
NEUTROPHILS NFR BLD: 70.3 % (ref 38–73)
NITRITE UR QL STRIP: NEGATIVE
NRBC BLD-RTO: 0 /100 WBC
NRBC BLD-RTO: 0 /100 WBC
PH UR STRIP: 6 [PH] (ref 5–8)
PHOSPHATE SERPL-MCNC: 5 MG/DL (ref 2.7–4.5)
PLATELET # BLD AUTO: 361 K/UL (ref 150–450)
PLATELET # BLD AUTO: 426 K/UL (ref 150–450)
PMV BLD AUTO: 10.7 FL (ref 9.2–12.9)
PMV BLD AUTO: 10.9 FL (ref 9.2–12.9)
POCT GLUCOSE: 100 MG/DL (ref 70–110)
POCT GLUCOSE: 132 MG/DL (ref 70–110)
POCT GLUCOSE: 138 MG/DL (ref 70–110)
POCT GLUCOSE: 62 MG/DL (ref 70–110)
POTASSIUM SERPL-SCNC: 5.1 MMOL/L (ref 3.5–5.1)
PROT SERPL-MCNC: 6.4 G/DL (ref 6–8.4)
PROT UR QL STRIP: ABNORMAL
RBC # BLD AUTO: 2.77 M/UL (ref 4–5.4)
RBC # BLD AUTO: 3.45 M/UL (ref 4–5.4)
RBC #/AREA URNS AUTO: 89 /HPF (ref 0–4)
SARS-COV-2 RDRP RESP QL NAA+PROBE: NEGATIVE
SODIUM SERPL-SCNC: 136 MMOL/L (ref 136–145)
SP GR UR STRIP: 1.01 (ref 1–1.03)
SQUAMOUS #/AREA URNS AUTO: 1 /HPF
TROPONIN I SERPL DL<=0.01 NG/ML-MCNC: 0.01 NG/ML (ref 0–0.03)
URN SPEC COLLECT METH UR: ABNORMAL
WBC # BLD AUTO: 8.68 K/UL (ref 3.9–12.7)
WBC # BLD AUTO: 9.94 K/UL (ref 3.9–12.7)
WBC #/AREA URNS AUTO: >100 /HPF (ref 0–5)
WBC CLUMPS UR QL AUTO: ABNORMAL

## 2022-03-12 PROCEDURE — 99220 PR INITIAL OBSERVATION CARE,LEVL III: ICD-10-PCS | Mod: ,,, | Performed by: INTERNAL MEDICINE

## 2022-03-12 PROCEDURE — 63600175 PHARM REV CODE 636 W HCPCS: Performed by: EMERGENCY MEDICINE

## 2022-03-12 PROCEDURE — G0378 HOSPITAL OBSERVATION PER HR: HCPCS

## 2022-03-12 PROCEDURE — 96367 TX/PROPH/DG ADDL SEQ IV INF: CPT

## 2022-03-12 PROCEDURE — 94761 N-INVAS EAR/PLS OXIMETRY MLT: CPT

## 2022-03-12 PROCEDURE — 96372 THER/PROPH/DIAG INJ SC/IM: CPT | Performed by: INTERNAL MEDICINE

## 2022-03-12 PROCEDURE — 81001 URINALYSIS AUTO W/SCOPE: CPT | Performed by: STUDENT IN AN ORGANIZED HEALTH CARE EDUCATION/TRAINING PROGRAM

## 2022-03-12 PROCEDURE — 25000003 PHARM REV CODE 250: Performed by: STUDENT IN AN ORGANIZED HEALTH CARE EDUCATION/TRAINING PROGRAM

## 2022-03-12 PROCEDURE — 85379 FIBRIN DEGRADATION QUANT: CPT | Performed by: EMERGENCY MEDICINE

## 2022-03-12 PROCEDURE — 83735 ASSAY OF MAGNESIUM: CPT | Performed by: INTERNAL MEDICINE

## 2022-03-12 PROCEDURE — 85025 COMPLETE CBC W/AUTO DIFF WBC: CPT | Performed by: INTERNAL MEDICINE

## 2022-03-12 PROCEDURE — 82962 GLUCOSE BLOOD TEST: CPT

## 2022-03-12 PROCEDURE — C9399 UNCLASSIFIED DRUGS OR BIOLOG: HCPCS | Performed by: INTERNAL MEDICINE

## 2022-03-12 PROCEDURE — 25000003 PHARM REV CODE 250: Performed by: EMERGENCY MEDICINE

## 2022-03-12 PROCEDURE — 87186 SC STD MICRODIL/AGAR DIL: CPT | Performed by: STUDENT IN AN ORGANIZED HEALTH CARE EDUCATION/TRAINING PROGRAM

## 2022-03-12 PROCEDURE — 96365 THER/PROPH/DIAG IV INF INIT: CPT

## 2022-03-12 PROCEDURE — 99220 PR INITIAL OBSERVATION CARE,LEVL III: CPT | Mod: ,,, | Performed by: INTERNAL MEDICINE

## 2022-03-12 PROCEDURE — 99214 PR OFFICE/OUTPT VISIT, EST, LEVL IV, 30-39 MIN: ICD-10-PCS | Mod: GC,,, | Performed by: STUDENT IN AN ORGANIZED HEALTH CARE EDUCATION/TRAINING PROGRAM

## 2022-03-12 PROCEDURE — 25000003 PHARM REV CODE 250: Performed by: INTERNAL MEDICINE

## 2022-03-12 PROCEDURE — 84100 ASSAY OF PHOSPHORUS: CPT | Performed by: INTERNAL MEDICINE

## 2022-03-12 PROCEDURE — 96366 THER/PROPH/DIAG IV INF ADDON: CPT

## 2022-03-12 PROCEDURE — 99214 OFFICE O/P EST MOD 30 MIN: CPT | Mod: GC,,, | Performed by: STUDENT IN AN ORGANIZED HEALTH CARE EDUCATION/TRAINING PROGRAM

## 2022-03-12 PROCEDURE — 87088 URINE BACTERIA CULTURE: CPT | Performed by: STUDENT IN AN ORGANIZED HEALTH CARE EDUCATION/TRAINING PROGRAM

## 2022-03-12 PROCEDURE — U0002 COVID-19 LAB TEST NON-CDC: HCPCS | Performed by: EMERGENCY MEDICINE

## 2022-03-12 PROCEDURE — 63600175 PHARM REV CODE 636 W HCPCS: Performed by: STUDENT IN AN ORGANIZED HEALTH CARE EDUCATION/TRAINING PROGRAM

## 2022-03-12 PROCEDURE — 80053 COMPREHEN METABOLIC PANEL: CPT | Performed by: INTERNAL MEDICINE

## 2022-03-12 PROCEDURE — 84484 ASSAY OF TROPONIN QUANT: CPT | Performed by: STUDENT IN AN ORGANIZED HEALTH CARE EDUCATION/TRAINING PROGRAM

## 2022-03-12 PROCEDURE — 87086 URINE CULTURE/COLONY COUNT: CPT | Performed by: STUDENT IN AN ORGANIZED HEALTH CARE EDUCATION/TRAINING PROGRAM

## 2022-03-12 PROCEDURE — 63600175 PHARM REV CODE 636 W HCPCS: Performed by: INTERNAL MEDICINE

## 2022-03-12 PROCEDURE — 87077 CULTURE AEROBIC IDENTIFY: CPT | Performed by: STUDENT IN AN ORGANIZED HEALTH CARE EDUCATION/TRAINING PROGRAM

## 2022-03-12 PROCEDURE — 87040 BLOOD CULTURE FOR BACTERIA: CPT | Mod: 59 | Performed by: EMERGENCY MEDICINE

## 2022-03-12 RX ORDER — TALC
6 POWDER (GRAM) TOPICAL NIGHTLY PRN
Status: DISCONTINUED | OUTPATIENT
Start: 2022-03-12 | End: 2022-03-16 | Stop reason: HOSPADM

## 2022-03-12 RX ORDER — PROCHLORPERAZINE EDISYLATE 5 MG/ML
5 INJECTION INTRAMUSCULAR; INTRAVENOUS EVERY 6 HOURS PRN
Status: DISCONTINUED | OUTPATIENT
Start: 2022-03-12 | End: 2022-03-16 | Stop reason: HOSPADM

## 2022-03-12 RX ORDER — IBUPROFEN 200 MG
24 TABLET ORAL
Status: DISCONTINUED | OUTPATIENT
Start: 2022-03-12 | End: 2022-03-16 | Stop reason: HOSPADM

## 2022-03-12 RX ORDER — CLOPIDOGREL BISULFATE 75 MG/1
75 TABLET ORAL DAILY
Status: DISCONTINUED | OUTPATIENT
Start: 2022-03-12 | End: 2022-03-16 | Stop reason: HOSPADM

## 2022-03-12 RX ORDER — NALOXONE HCL 0.4 MG/ML
0.02 VIAL (ML) INJECTION
Status: DISCONTINUED | OUTPATIENT
Start: 2022-03-12 | End: 2022-03-16 | Stop reason: HOSPADM

## 2022-03-12 RX ORDER — SODIUM CHLORIDE 9 MG/ML
INJECTION, SOLUTION INTRAVENOUS CONTINUOUS
Status: ACTIVE | OUTPATIENT
Start: 2022-03-12 | End: 2022-03-12

## 2022-03-12 RX ORDER — SODIUM CHLORIDE 0.9 % (FLUSH) 0.9 %
10 SYRINGE (ML) INJECTION
Status: DISCONTINUED | OUTPATIENT
Start: 2022-03-12 | End: 2022-03-16 | Stop reason: HOSPADM

## 2022-03-12 RX ORDER — INSULIN ASPART 100 [IU]/ML
4 INJECTION, SOLUTION INTRAVENOUS; SUBCUTANEOUS
Status: DISCONTINUED | OUTPATIENT
Start: 2022-03-12 | End: 2022-03-16 | Stop reason: HOSPADM

## 2022-03-12 RX ORDER — FUROSEMIDE 40 MG/1
40 TABLET ORAL DAILY
Status: DISCONTINUED | OUTPATIENT
Start: 2022-03-12 | End: 2022-03-16 | Stop reason: HOSPADM

## 2022-03-12 RX ORDER — MIRTAZAPINE 15 MG/1
15 TABLET, FILM COATED ORAL NIGHTLY
Status: DISCONTINUED | OUTPATIENT
Start: 2022-03-13 | End: 2022-03-16 | Stop reason: HOSPADM

## 2022-03-12 RX ORDER — SIMETHICONE 80 MG
1 TABLET,CHEWABLE ORAL 4 TIMES DAILY PRN
Status: DISCONTINUED | OUTPATIENT
Start: 2022-03-12 | End: 2022-03-16 | Stop reason: HOSPADM

## 2022-03-12 RX ORDER — ALLOPURINOL 100 MG/1
100 TABLET ORAL DAILY
Status: DISCONTINUED | OUTPATIENT
Start: 2022-03-12 | End: 2022-03-16 | Stop reason: HOSPADM

## 2022-03-12 RX ORDER — ATORVASTATIN CALCIUM 20 MG/1
80 TABLET, FILM COATED ORAL NIGHTLY
Status: DISCONTINUED | OUTPATIENT
Start: 2022-03-12 | End: 2022-03-16 | Stop reason: HOSPADM

## 2022-03-12 RX ORDER — GLUCAGON 1 MG
1 KIT INJECTION
Status: DISCONTINUED | OUTPATIENT
Start: 2022-03-12 | End: 2022-03-16 | Stop reason: HOSPADM

## 2022-03-12 RX ORDER — VANCOMYCIN HCL IN 5 % DEXTROSE 1G/250ML
15 PLASTIC BAG, INJECTION (ML) INTRAVENOUS
Status: COMPLETED | OUTPATIENT
Start: 2022-03-12 | End: 2022-03-12

## 2022-03-12 RX ORDER — ONDANSETRON 4 MG/1
4 TABLET, ORALLY DISINTEGRATING ORAL EVERY 8 HOURS PRN
Status: DISCONTINUED | OUTPATIENT
Start: 2022-03-12 | End: 2022-03-16 | Stop reason: HOSPADM

## 2022-03-12 RX ORDER — CARVEDILOL 3.12 MG/1
3.12 TABLET ORAL 2 TIMES DAILY
Status: DISCONTINUED | OUTPATIENT
Start: 2022-03-12 | End: 2022-03-16 | Stop reason: HOSPADM

## 2022-03-12 RX ORDER — POLYETHYLENE GLYCOL 3350 17 G/17G
17 POWDER, FOR SOLUTION ORAL 2 TIMES DAILY PRN
Status: DISCONTINUED | OUTPATIENT
Start: 2022-03-12 | End: 2022-03-16 | Stop reason: HOSPADM

## 2022-03-12 RX ORDER — HYDROCODONE BITARTRATE AND ACETAMINOPHEN 5; 325 MG/1; MG/1
1 TABLET ORAL EVERY 4 HOURS PRN
Status: DISCONTINUED | OUTPATIENT
Start: 2022-03-12 | End: 2022-03-16 | Stop reason: HOSPADM

## 2022-03-12 RX ORDER — IPRATROPIUM BROMIDE AND ALBUTEROL SULFATE 2.5; .5 MG/3ML; MG/3ML
3 SOLUTION RESPIRATORY (INHALATION) EVERY 6 HOURS PRN
Status: DISCONTINUED | OUTPATIENT
Start: 2022-03-12 | End: 2022-03-16 | Stop reason: HOSPADM

## 2022-03-12 RX ORDER — IBUPROFEN 200 MG
16 TABLET ORAL
Status: DISCONTINUED | OUTPATIENT
Start: 2022-03-12 | End: 2022-03-16 | Stop reason: HOSPADM

## 2022-03-12 RX ORDER — ACETAMINOPHEN 325 MG/1
650 TABLET ORAL EVERY 8 HOURS PRN
Status: DISCONTINUED | OUTPATIENT
Start: 2022-03-12 | End: 2022-03-16 | Stop reason: HOSPADM

## 2022-03-12 RX ORDER — INSULIN ASPART 100 [IU]/ML
0-5 INJECTION, SOLUTION INTRAVENOUS; SUBCUTANEOUS
Status: DISCONTINUED | OUTPATIENT
Start: 2022-03-12 | End: 2022-03-16 | Stop reason: HOSPADM

## 2022-03-12 RX ORDER — SERTRALINE HYDROCHLORIDE 50 MG/1
50 TABLET, FILM COATED ORAL DAILY
Status: DISCONTINUED | OUTPATIENT
Start: 2022-03-12 | End: 2022-03-16 | Stop reason: HOSPADM

## 2022-03-12 RX ORDER — SODIUM BICARBONATE 650 MG/1
650 TABLET ORAL 2 TIMES DAILY
Status: DISCONTINUED | OUTPATIENT
Start: 2022-03-12 | End: 2022-03-16 | Stop reason: HOSPADM

## 2022-03-12 RX ADMIN — PIPERACILLIN AND TAZOBACTAM 4.5 G: 4; .5 INJECTION, POWDER, LYOPHILIZED, FOR SOLUTION INTRAVENOUS; PARENTERAL at 03:03

## 2022-03-12 RX ADMIN — INSULIN DETEMIR 9 UNITS: 100 INJECTION, SOLUTION SUBCUTANEOUS at 05:03

## 2022-03-12 RX ADMIN — INSULIN ASPART 4 UNITS: 100 INJECTION, SOLUTION INTRAVENOUS; SUBCUTANEOUS at 05:03

## 2022-03-12 RX ADMIN — SODIUM CHLORIDE 1000 ML: 0.9 INJECTION, SOLUTION INTRAVENOUS at 01:03

## 2022-03-12 RX ADMIN — RIVAROXABAN 2.5 MG: 2.5 TABLET, FILM COATED ORAL at 09:03

## 2022-03-12 RX ADMIN — DEXTROSE 125 ML: 10 SOLUTION INTRAVENOUS at 11:03

## 2022-03-12 RX ADMIN — ALLOPURINOL 100 MG: 100 TABLET ORAL at 08:03

## 2022-03-12 RX ADMIN — INSULIN DETEMIR 9 UNITS: 100 INJECTION, SOLUTION SUBCUTANEOUS at 08:03

## 2022-03-12 RX ADMIN — ATORVASTATIN CALCIUM 80 MG: 40 TABLET, FILM COATED ORAL at 05:03

## 2022-03-12 RX ADMIN — SODIUM BICARBONATE 650 MG TABLET 650 MG: at 08:03

## 2022-03-12 RX ADMIN — FUROSEMIDE 40 MG: 40 TABLET ORAL at 08:03

## 2022-03-12 RX ADMIN — CARVEDILOL 3.12 MG: 3.12 TABLET, FILM COATED ORAL at 08:03

## 2022-03-12 RX ADMIN — SERTRALINE HYDROCHLORIDE 50 MG: 50 TABLET ORAL at 08:03

## 2022-03-12 RX ADMIN — CLOPIDOGREL 75 MG: 75 TABLET, FILM COATED ORAL at 08:03

## 2022-03-12 RX ADMIN — SODIUM CHLORIDE: 0.9 INJECTION, SOLUTION INTRAVENOUS at 06:03

## 2022-03-12 RX ADMIN — RIVAROXABAN 2.5 MG: 2.5 TABLET, FILM COATED ORAL at 06:03

## 2022-03-12 RX ADMIN — VANCOMYCIN HYDROCHLORIDE 1000 MG: 1 INJECTION, POWDER, LYOPHILIZED, FOR SOLUTION INTRAVENOUS at 01:03

## 2022-03-12 RX ADMIN — HYDROCODONE BITARTRATE AND ACETAMINOPHEN 1 TABLET: 5; 325 TABLET ORAL at 08:03

## 2022-03-12 RX ADMIN — ATORVASTATIN CALCIUM 80 MG: 40 TABLET, FILM COATED ORAL at 08:03

## 2022-03-12 RX ADMIN — PIPERACILLIN AND TAZOBACTAM 4.5 G: 4; .5 INJECTION, POWDER, LYOPHILIZED, FOR SOLUTION INTRAVENOUS; PARENTERAL at 11:03

## 2022-03-12 NOTE — H&P
"[facility] - Emergency Dept  [facility]  History & Physical    Patient Name: Suyapa Connelly  MRN: [id number]  Patient Class: OP- Observation  Admission Date: 3/11/2022  Attending Physician: Hitesh Quijano MD   Primary Care Provider: Magen Christensen MD         Patient information was obtained from patient, past medical records and ER records.     Subjective:     Principal Problem:Infection of amputation stump of right lower extremity    Chief Complaint:   Chief Complaint   Patient presents with    Loss of Consciousness     Per Pt.'s  she "passed out and went limp" in bed around 3 hours ago. Pt is AAOx4, GCS of 15 at this time. Pt c/o of nausea at this time. PMH of CABG.         HPI: 55 yo female with CAD s/p CABG, CKD3, DM2, osteomyelitis with previous left AKA and newer Right BKA (12/2021), and recent hospitalization for sepsis and wound infection presenting for nausea, syncope, vomitting. Symptoms resolved about 15 seconds and returned to baseline afterwards. She ultimately denies any current symptom; however she does report that she has been having trouble following up about her BKA and cleaning it at home. There is some purulent drainage from the site of her BKA however.       Past Medical History:   Diagnosis Date    Anxiety     Chronic pain syndrome     CKD (chronic kidney disease), stage III     Depression     Diabetes mellitus     type 2    Diabetes mellitus, type 2     GERD (gastroesophageal reflux disease)     Hyperemesis 3/23/2021    Hyperlipemia     Hypertension     Hypokalemia 3/23/2021    Myocardial infarction 2010    minor-caused by stress per pt.    Osteomyelitis     Osteomyelitis of left foot 4/30/2021    PVD (peripheral vascular disease)     Vaginal delivery     x1       Past Surgical History:   Procedure Laterality Date    ABOVE-KNEE AMPUTATION Left 5/18/2021    Procedure: AMPUTATION, ABOVE KNEE;  Surgeon: Teddy Huber MD;  Location: [facility] OR [address] [place] [address];  Service: Vascular;  " Laterality: Left;    Angiogram - Right Extremity Right 7/9/15    angiogram-left leg  10/6/15    ANGIOGRAPHY OF LOWER EXTREMITY Left 4/29/2021    Procedure: ANGIOGRAM, LOWER EXTREMITY;  Surgeon: Teddy Huber MD;  Location: [address];  Service: Vascular;  Laterality: Left;    BELOW KNEE AMPUTATION OF LOWER EXTREMITY Right 12/28/2021    Procedure: AMPUTATION, BELOW KNEE;  Surgeon: Kaitlyn Rojas MD;  Location: [facility];  Service: General;  Laterality: Right;    CATHETERIZATION OF BOTH LEFT AND RIGHT HEART N/A 12/18/2019    Procedure: CATHETERIZATION, HEART, BOTH LEFT AND RIGHT;  Surgeon: Que Fernando III, MD;  Location: [facility] CATH LAB;  Service: Cardiology;  Laterality: N/A;    CORONARY ANGIOGRAPHY N/A 12/18/2019    Procedure: ANGIOGRAM, CORONARY ARTERY;  Surgeon: Que Fernando III, MD;  Location: [facility] CATH LAB;  Service: Cardiology;  Laterality: N/A;    CORONARY ANGIOGRAPHY INCLUDING BYPASS GRAFTS WITH CATHETERIZATION OF LEFT HEART N/A 7/28/2020    Procedure: ANGIOGRAM, CORONARY, INCLUDING BYPASS GRAFT, WITH LEFT HEART CATHETERIZATION, 9 am;  Surgeon: Rachel Easley MD;  Location: [facility] CATH LAB;  Service: Cardiology;  Laterality: N/A;    CORONARY ARTERY BYPASS GRAFT (CABG) N/A 1/14/2020    Procedure: CORONARY ARTERY BYPASS GRAFT (CABG) x 1     Off Pump;  Surgeon: Huang Altamirano MD;  Location: [address];  Service: Cardiovascular;  Laterality: N/A;    CREATION OF FEMORAL-TIBIAL ARTERY BYPASS Left 4/29/2021    Procedure: CREATION, BYPASS, ARTERIAL, FEMORAL TO ANTERIOR TIBIAL;  Surgeon: Teddy Huber MD;  Location: [address];  Service: Vascular;  Laterality: Left;    CREATION OF FEMOROPOPLITEAL ARTERIAL BYPASS USING GRAFT Left 8/18/2020    Procedure: CREATION, BYPASS, ARTERIAL, FEMORAL TO POPLITEAL, USING GRAFT, LEFT LOWER EXTREMITY;  Surgeon: Teddy Huber MD;  Location: [facility];  Service: Vascular;  Laterality: Left;  REQUEST 7:15 A.M. START----COVID NEGATIVE  ON 8/17  1ST CASE STARTKENYA DUEÑAS ON 8/7/2020 @ 942AM-LO  RN PREOP 8/12/2020   T/S-----CLEARED BY CARDS-------PENDING INSURANCE    DEBRIDEMENT OF FOOT Left 9/8/2020    Procedure: DEBRIDEMENT, FOOT;  Surgeon: Rosio Mayes DPM;  Location: [facility] OR;  Service: Podiatry;  Laterality: Left;  request neoxx .   RN Pre Op 9-4-2020, Covid negative on 9/5/20. C A    DEBRIDEMENT OF FOOT  3/4/2021    Procedure: DEBRIDEMENT, FOOT;  Surgeon: Teddy Huber MD;  Location: [facility] OR;  Service: Vascular;;    DEBRIDEMENT OF FOOT Left 3/9/2021    Procedure: DEBRIDEMENT, FOOT, bone biopsy;  Surgeon: Rosio Mayes DPM;  Location: [facility] OR;  Service: Podiatry;  Laterality: Left;  Request neoxx---COVID IN AM  REQUESTING NOON START  RN Phone Pre op.On Blood thinners Plavix and Eliquis.  Covid am of surgery. C A    DEBRIDEMENT OF FOOT Left 5/4/2021    Procedure: DEBRIDEMENT, FOOT;  Surgeon: Farooq Morley DPM;  Location: [facility] OR 2ND FLR;  Service: Podiatry;  Laterality: Left;    INSERTION OF TUNNELED CENTRAL VENOUS HEMODIALYSIS CATHETER N/A 1/27/2020    Procedure: Insertion, Catheter, Central Venous, Hemodialysis;  Surgeon: ESTEBAN Gomez III, MD;  Location: [facility] CATH LAB;  Service: Peripheral Vascular;  Laterality: N/A;    PERCUTANEOUS TRANSLUMINAL ANGIOPLASTY N/A 3/4/2021    Procedure: PTA (ANGIOPLASTY, PERCUTANEOUS, TRANSLUMINAL);  Surgeon: Teddy Huber MD;  Location: [facility] OR;  Service: Vascular;  Laterality: N/A;    REMOVAL OF ARTERIOVENOUS GRAFT Left 5/27/2021    Procedure: REMOVAL, GRAFT, LEFT LOWER EXTREMITY, WOUND EXPLORATION;  Surgeon: Teddy Huber MD;  Location: [facility] OR 2ND FLR;  Service: Vascular;  Laterality: Left;    REMOVAL OF NAIL OF DIGIT Left 3/9/2021    Procedure: REMOVAL, NAIL, DIGIT;  Surgeon: Rosio Mayes DPM;  Location: [facility] OR;  Service: Podiatry;  Laterality: Left;    THROMBECTOMY Left 3/4/2021    Procedure: THROMBECTOMY, LEFT LOWER EXTREMITY BYPASS GRAFT, ANGIOGRAM, POSSIBLE  INTERVENTION, POSSIBLE LEFT LOWER EXTREMITY BYPASS;  Surgeon: Teddy Huber MD;  Location: [facility] OR;  Service: Vascular;  Laterality: Left;    THROMBECTOMY Left 4/29/2021    Procedure: GRAFT THROMBECTOMY, LEFT LOWER EXTREMITY;  Surgeon: Teddy Huber MD;  Location: [facility] OR [facility] FLR;  Service: Vascular;  Laterality: Left;  14.5 min  1179.85 mGy  341.01 Gycm2  240 ml dye    THROMBECTOMY  10/22/2021    Procedure: THROMBECTOMY;  Surgeon: Saad Arenas MD;  Location: [facility] CATH LAB/EP;  Service: Cardiology;;       Review of patient's allergies indicates:   Allergen Reactions    Ciprofloxacin Itching    Contrast media      Kidney injury    Iodine      Kidney injury       Current Facility-Administered Medications on File Prior to Encounter   Medication    [DISCONTINUED] GENERIC EXTERNAL MEDICATION    [DISCONTINUED] GENERIC EXTERNAL MEDICATION     Current Outpatient Medications on File Prior to Encounter   Medication Sig    acetaminophen (TYLENOL) 500 MG tablet Take 2 tablets (1,000 mg total) by mouth every 8 (eight) hours as needed for Pain.    allopurinoL (ZYLOPRIM) 100 MG tablet Take 1 tablet (100 mg total) by mouth once daily.    atorvastatin (LIPITOR) 80 MG tablet Take 1 tablet (80 mg total) by mouth every evening.    blood-glucose meter,continuous (DEXCOM G6 ) Misc Use to check blood sugars 4 times/day    blood-glucose sensor (DEXCOM G6 SENSOR) Radha Apply one sensor to skin every 10 days    blood-glucose transmitter (DEXCOM G6 TRANSMITTER) Radha Replace transmitter every 3 months to use with dexcom sensor    carvediloL (COREG) 3.125 MG tablet Take 1 tablet (3.125 mg total) by mouth 2 (two) times daily.    clopidogreL (PLAVIX) 75 mg tablet Take 1 tablet (75 mg total) by mouth once daily.    furosemide (LASIX) 40 MG tablet Take 1 tablet (40 mg total) by mouth once daily. Please hold until follow up with PCP    HYDROcodone-acetaminophen (NORCO) 5-325 mg per tablet Take 1 tablet by mouth every 4 (four)  hours as needed for Pain.    insulin aspart U-100 (NOVOLOG FLEXPEN U-100 INSULIN) 100 unit/mL (3 mL) InPn pen Inject 5 Units into the skin 3 (three) times daily with meals. If not eating, ok to hold    insulin detemir U-100 (LEVEMIR FLEXTOUCH) 100 unit/mL (3 mL) SubQ InPn pen Inject 12 Units into the skin every evening.    magnesium oxide (MAG-OX) 400 mg (241.3 mg magnesium) tablet Take 1 tablet (400 mg total) by mouth 2 (two) times daily.    melatonin (MELATIN) 3 mg tablet Take 2 tablets (6 mg total) by mouth nightly as needed for Insomnia.    mirtazapine (REMERON) 7.5 MG Tab Take 2 tablets (15 mg total) by mouth nightly.    multivit/folic acid/vit K1 (ONE-A-DAY WOMEN'S 50 PLUS ORAL) Take 1 tablet by mouth Daily.    ondansetron (ZOFRAN-ODT) 8 MG TbDL Dissolve 1 tablet (8 mg total) by mouth every 8 (eight) hours as needed.    oxyCODONE-acetaminophen (PERCOCET)  mg per tablet Take 1 tablet by mouth every 6 (six) hours as needed.    rivaroxaban (XARELTO) 2.5 mg Tab Take 1 tablet (2.5 mg total) by mouth 2 (two) times daily with meals.    sertraline (ZOLOFT) 50 MG tablet Take 1 tablet (50 mg total) by mouth once daily.    sodium bicarbonate 650 MG tablet Take 1 tablet (650 mg total) by mouth 2 (two) times daily. Please hold until follow up with Primary Care Provider    [DISCONTINUED] lancing device Misc 1 Device by Misc.(Non-Drug; Combo Route) route 2 (two) times daily with meals.     Family History       Problem Relation (Age of Onset)    Diabetes Mother, Father, Paternal Grandmother    Heart disease Maternal Grandmother    No Known Problems Maternal Grandfather, Paternal Grandfather          Tobacco Use    Smoking status: Former Smoker    Smokeless tobacco: Never Used   Substance and Sexual Activity    Alcohol use: No    Drug use: Yes     Types: Marijuana     Comment: occassional    Sexual activity: Yes     Partners: Male     Review of Systems   Constitutional:  Negative for activity change, appetite change,  chills and fever.   HENT:  Negative for congestion, hearing loss and rhinorrhea.    Eyes:  Negative for discharge, itching and visual disturbance.   Respiratory:  Negative for apnea, cough and shortness of breath.    Cardiovascular:  Negative for chest pain, palpitations and leg swelling.   Gastrointestinal:  Positive for nausea and vomiting. Negative for abdominal distention, abdominal pain, constipation and diarrhea.   Endocrine: Negative for cold intolerance and heat intolerance.   Genitourinary:  Negative for dysuria and hematuria.   Musculoskeletal:  Negative for back pain, neck pain and neck stiffness.   Skin:  Negative for rash and wound.   Neurological:  Positive for syncope. Negative for dizziness, seizures, light-headedness and headaches.   Psychiatric/Behavioral:  Negative for agitation, confusion and suicidal ideas.    Objective:     Vital Signs (Most Recent):  Temp: 97.9 °F (36.6 °C) (03/11/22 2028)  Pulse: 95 (03/11/22 2200)  Resp: 16 (03/11/22 2200)  BP: (!) 127/55 (03/11/22 2200)  SpO2: 99 % (03/11/22 2200)   Vital Signs (24h Range):  Temp:  [97.9 °F (36.6 °C)] 97.9 °F (36.6 °C)  Pulse:  [70-95] 95  Resp:  [16] 16  SpO2:  [99 %-100 %] 99 %  BP: (123-127)/(55-62) 127/55        There is no height or weight on file to calculate BMI.    Physical Exam  Vitals reviewed.   Constitutional:       General: She is not in acute distress.     Appearance: She is well-developed.   HENT:      Head: Normocephalic and atraumatic.      Nose: Nose normal. No rhinorrhea.      Mouth/Throat:      Mouth: Mucous membranes are moist.   Eyes:      General: No scleral icterus.        Right eye: No discharge.         Left eye: No discharge.      Pupils: Pupils are equal, round, and reactive to light.   Neck:      Vascular: No JVD.   Cardiovascular:      Rate and Rhythm: Normal rate and regular rhythm.      Heart sounds: Normal heart sounds. No murmur heard.    No friction rub.   Pulmonary:      Effort: Pulmonary effort is  normal. No respiratory distress.      Breath sounds: Normal breath sounds. No wheezing.   Abdominal:      General: Bowel sounds are normal. There is no distension.      Palpations: Abdomen is soft.      Tenderness: There is no abdominal tenderness.   Musculoskeletal:         General: No deformity. Normal range of motion.      Cervical back: Normal range of motion and neck supple.      Comments: Left AKA, Right BKA   Skin:     General: Skin is warm and dry.   Neurological:      General: No focal deficit present.      Mental Status: She is alert and oriented to person, place, and time.   Psychiatric:         Mood and Affect: Mood normal.         Behavior: Behavior normal.         CRANIAL NERVES     CN III, IV, VI   Pupils are equal, round, and reactive to light.     Significant Labs: All pertinent labs within the past 24 hours have been reviewed.  CBC:   Recent Labs   Lab 03/11/22  2239   WBC 9.94   HGB 10.3*   HCT 32.8*        CMP:   Recent Labs   Lab 03/11/22  2239      K 5.1      CO2 18*   *   BUN 61*   CREATININE 2.1*   CALCIUM 9.1   PROT 8.2   ALBUMIN 2.6*   BILITOT 0.2   ALKPHOS 126   AST 11   ALT 6*   ANIONGAP 13   EGFRNONAA 25.9*       Significant Imaging: I have reviewed all pertinent imaging results/findings within the past 24 hours.  Assessment/Plan:     * Infection of amputation stump of right lower extremity  Acute infection  Will continue broad spectrum abx currently with MDRO in cultures previously  Wound care consult  Surgical consult      Class 2 severe obesity due to excess calories with serious comorbidity in adult  Morbid obesity complicates all aspects of disease management from diagnostic modalities to treatment. Weight loss encouraged and health benefits explained to patient.         Stage 3 chronic kidney disease  Likely elevated above baseline, will give small bolus of IVFs, follow creatinine      Paroxysmal atrial fibrillation  Patient with Paroxysmal (<7 days)  atrial fibrillation which is controlled currently with home medications. Patient is currently in atrial fibrillation.[id number] Score: 3. Anticoagulation indicated. Anticoagulation done with xarelto.        Coronary atherosclerosis  Chronic, controlled, continue home meds    Type 2 diabetes mellitus with hyperglycemia, with long-term current use of insulin  Chronic, controlled, continue home insulin dosages at reduced dosages      Mixed hyperlipidemia  Chronic, controlled, continue statin      CAROLIN (generalized anxiety disorder)  Continue home medications  Chronic, controlled      Essential hypertension  Chronic, controlled, continue home meds      Peripheral vascular disease  Chronic, s/p bilateral amputations      VTE Risk Mitigation (From admission, onward)           Ordered     IP VTE HIGH RISK PATIENT  Once         03/12/22 0326     Place sequential compression device  Until discontinued         03/12/22 0326     Reason for No Pharmacological VTE Prophylaxis  Once        Question:  Reasons:  Answer:  Already adequately anticoagulated on oral Anticoagulants    03/12/22 0326                       Dav Mao MD  Department of Hospital Medicine   [facility] - Emergency Dept

## 2022-03-12 NOTE — ED TRIAGE NOTES
"Suyapa Connelly, an 56 y.o. female presents to the ED from home after possible syncopal episode. Per pt's , pt "passed out and went limp" in bed around 3 hrs ago. Pt currently AAOx4, GCS 15. C/o of nausea, no vomiting. PMH of CABG and multiple other surgeries.       Chief Complaint   Patient presents with    Loss of Consciousness     Per Pt.'s  she "passed out and went limp" in bed around 3 hours ago. Pt is AAOx4, GCS of 15 at this time. Pt c/o of nausea at this time. PMH of CABG.      Review of patient's allergies indicates:   Allergen Reactions    Ciprofloxacin Itching    Contrast media      Kidney injury    Iodine      Kidney injury     Past Medical History:   Diagnosis Date    Anxiety     Chronic pain syndrome     CKD (chronic kidney disease), stage III     Depression     Diabetes mellitus     type 2    Diabetes mellitus, type 2     GERD (gastroesophageal reflux disease)     Hyperemesis 3/23/2021    Hyperlipemia     Hypertension     Hypokalemia 3/23/2021    Myocardial infarction 2010    minor-caused by stress per pt.    Osteomyelitis     Osteomyelitis of left foot 4/30/2021    PVD (peripheral vascular disease)     Vaginal delivery     x1        LOC: The patient is awake, alert and aware of environment with an appropriate affect, the patient is oriented x 3 and speaking appropriately.   APPEARANCE: Patient appears comfortable and in no acute distress, patient is clean and well groomed.  SKIN: The skin is warm and dry, color consistent with ethnicity.   MUSCULOSKELETAL: Patient moving all upper extremities spontaneously, no swelling noted. Bilateral leg amputations.  RESPIRATORY: Airway is open and patent, respirations are spontaneous, patient has a normal effort and rate, no accessory muscle use noted.  CARDIAC: Patient has a normal rate and regular rhythm, no edema noted, capillary refill < 3 seconds.   GASTRO: Soft and non tender to palpation, no distention noted.   : Pt " denies any pain or frequency with urination.  NEURO: Pt opens eyes spontaneously, behavior appropriate to situation, follows commands.

## 2022-03-12 NOTE — HPI
Suyapa Connelly is 55 y/o female with Hx of CAD s/p CABG, DM2 c/b DM foot/osteo s/p left AKA and right BKA 12/2021 with stump revision 1/22 c/b wound infection (ESBL klebsiella/pseudomonas) s/p meropenem x 2 weeks with ETD 2/7 ( CRP 2/3 ~83), recent admission for AMS and myoclonus 2/2 ROSLYN and medication SE (pregabalin/ mirtazapine) - Now presented with episode of syncope last few secs per her  - patient now back to baseline.    Patient and her  report that she was feeling nauseous (chronic nausea) and she became unresponsive for few sec with eyes opened no jerky movements. She denies any fever, chills, abdominal pain, dysuria or drainage from stump - she denies pain in the stump and  been doing her dressing.    Patient did not tolerate cipro 2/2 to itching but did tolerate levofloxacin.    ID consulted for concern for stump infection.

## 2022-03-12 NOTE — ASSESSMENT & PLAN NOTE
55 y/o female with Hx of CAD s/p CABG, DM2 c/b DM foot/osteo s/p left AKA and right BKA 12/2021 with stump revision 1/22 c/b wound infection (ESBL klebsiella/pseudomonas) s/p meropenem x 2 weeks with ETD 2/7 ( CRP 2/3 ~83), recent admission for AMS and myoclonus 2/2 ROSLYN and medication SE (pregabalin/ mirtazapine)- Now presenting after syncope episode - She has been afebrile - wbc ~9 - CRP ~24 (decreased). She was started on vanc/zosyn    Recommendations:    1. At this time would hold off Abx as presentation less concerning for infected stump.  2. Agree with MRI to evaluate for chronic osteo- if there is a concern for bone infection on images, will need bone biopsy to further direct Abx.  3. Wound care consult.  4. Will follow up cultures.  5. Syncope w/u per primary.

## 2022-03-12 NOTE — ASSESSMENT & PLAN NOTE
Patient with Paroxysmal (<7 days) atrial fibrillation which is controlled currently with home medications. Patient is currently in atrial fibrillation.[id number] Score: 3. Anticoagulation indicated. Anticoagulation done with xarelto.

## 2022-03-12 NOTE — SUBJECTIVE & OBJECTIVE
Past Medical History:   Diagnosis Date    Anxiety     Chronic pain syndrome     CKD (chronic kidney disease), stage III     Depression     Diabetes mellitus     type 2    Diabetes mellitus, type 2     GERD (gastroesophageal reflux disease)     Hyperemesis 3/23/2021    Hyperlipemia     Hypertension     Hypokalemia 3/23/2021    Myocardial infarction 2010    minor-caused by stress per pt.    Osteomyelitis     Osteomyelitis of left foot 4/30/2021    PVD (peripheral vascular disease)     Vaginal delivery     x1       Past Surgical History:   Procedure Laterality Date    ABOVE-KNEE AMPUTATION Left 5/18/2021    Procedure: AMPUTATION, ABOVE KNEE;  Surgeon: Teddy Huber MD;  Location: [facility] OR [address] [place] [address];  Service: Vascular;  Laterality: Left;    Angiogram - Right Extremity Right 7/9/15    angiogram-left leg  10/6/15    ANGIOGRAPHY OF LOWER EXTREMITY Left 4/29/2021    Procedure: ANGIOGRAM, LOWER EXTREMITY;  Surgeon: Teddy Huber MD;  Location: [facility] OR [address] [place] [address];  Service: Vascular;  Laterality: Left;    BELOW KNEE AMPUTATION OF LOWER EXTREMITY Right 12/28/2021    Procedure: AMPUTATION, BELOW KNEE;  Surgeon: Kaitlyn Rojas MD;  Location: [facility];  Service: General;  Laterality: Right;    CATHETERIZATION OF BOTH LEFT AND RIGHT HEART N/A 12/18/2019    Procedure: CATHETERIZATION, HEART, BOTH LEFT AND RIGHT;  Surgeon: Que Fernando III, MD;  Location: [facility] CATH LAB;  Service: Cardiology;  Laterality: N/A;    CORONARY ANGIOGRAPHY N/A 12/18/2019    Procedure: ANGIOGRAM, CORONARY ARTERY;  Surgeon: Que Fernando III, MD;  Location: [facility] CATH LAB;  Service: Cardiology;  Laterality: N/A;    CORONARY ANGIOGRAPHY INCLUDING BYPASS GRAFTS WITH CATHETERIZATION OF LEFT HEART N/A 7/28/2020    Procedure: ANGIOGRAM, CORONARY, INCLUDING BYPASS GRAFT, WITH LEFT HEART CATHETERIZATION, 9 am;  Surgeon: Rachel Easley MD;  Location: [facility] CATH LAB;  Service: Cardiology;  Laterality: N/A;    CORONARY ARTERY BYPASS GRAFT  (CABG) N/A 1/14/2020    Procedure: CORONARY ARTERY BYPASS GRAFT (CABG) x 1     Off Pump;  Surgeon: Huang Altamirano MD;  Location: [facility] OR [address] [place] [address];  Service: Cardiovascular;  Laterality: N/A;    CREATION OF FEMORAL-TIBIAL ARTERY BYPASS Left 4/29/2021    Procedure: CREATION, BYPASS, ARTERIAL, FEMORAL TO ANTERIOR TIBIAL;  Surgeon: Teddy Huber MD;  [facility];  Service: Vascular;  Laterality: Left;    CREATION OF FEMOROPOPLITEAL ARTERIAL BYPASS USING GRAFT Left 8/18/2020    Procedure: CREATION, BYPASS, ARTERIAL, FEMORAL TO POPLITEAL, USING GRAFT, LEFT LOWER EXTREMITY;  Surgeon: Teddy Huber MD;  Location: [facility] OR;  Service: Vascular;  Laterality: Left;  REQUEST 7:15 A.M. START----COVID NEGATIVE ON 8/17 1ST CASE STARTE PER LEANA ON 8/7/2020 @ 942AM-  RN PREOP 8/12/2020   T/S-----CLEARED BY CARDS-------PENDING INSURANCE    DEBRIDEMENT OF FOOT Left 9/8/2020    Procedure: DEBRIDEMENT, FOOT;  Surgeon: Rosio Mayes DPM;  Location: [facility] OR;  Service: Podiatry;  Laterality: Left;  request neoxx .   RN Pre Op 9-4-2020, Covid negative on 9/5/20. C A    DEBRIDEMENT OF FOOT  3/4/2021    Procedure: DEBRIDEMENT, FOOT;  Surgeon: Teddy Huber MD;  Location: [facility] OR;  Service: Vascular;;    DEBRIDEMENT OF FOOT Left 3/9/2021    Procedure: DEBRIDEMENT, FOOT, bone biopsy;  Surgeon: Rosio Mayes DPM;  Location: [facility] OR;  Service: Podiatry;  Laterality: Left;  Request neoxx---COVID IN AM  REQUESTING NOON START  RN Phone Pre op.On Blood thinners Plavix and Eliquis.  Covid am of surgery. C A    DEBRIDEMENT OF FOOT Left 5/4/2021    Procedure: DEBRIDEMENT, FOOT;  Surgeon: Farooq Morley DPM;  [facility];  Service: Podiatry;  Laterality: Left;    INSERTION OF TUNNELED CENTRAL VENOUS HEMODIALYSIS CATHETER N/A 1/27/2020    Procedure: Insertion, Catheter, Central Venous, Hemodialysis;  Surgeon: ESTEBAN Gomez III, MD;  Location: [facility] CATH LAB;  Service: Peripheral Vascular;   Laterality: N/A;    PERCUTANEOUS TRANSLUMINAL ANGIOPLASTY N/A 3/4/2021    Procedure: PTA (ANGIOPLASTY, PERCUTANEOUS, TRANSLUMINAL);  Surgeon: Teddy Huber MD;  Location: [facility] OR;  Service: Vascular;  Laterality: N/A;    REMOVAL OF ARTERIOVENOUS GRAFT Left 5/27/2021    Procedure: REMOVAL, GRAFT, LEFT LOWER EXTREMITY, WOUND EXPLORATION;  Surgeon: Teddy Huber MD;  Location: [facility] OR 2ND FLR;  Service: Vascular;  Laterality: Left;    REMOVAL OF NAIL OF DIGIT Left 3/9/2021    Procedure: REMOVAL, NAIL, DIGIT;  Surgeon: Rosio Mayes DPM;  Location: [facility] OR;  Service: Podiatry;  Laterality: Left;    THROMBECTOMY Left 3/4/2021    Procedure: THROMBECTOMY, LEFT LOWER EXTREMITY BYPASS GRAFT, ANGIOGRAM, POSSIBLE INTERVENTION, POSSIBLE LEFT LOWER EXTREMITY BYPASS;  Surgeon: Teddy Huber MD;  Location: [facility] OR;  Service: Vascular;  Laterality: Left;    THROMBECTOMY Left 4/29/2021    Procedure: GRAFT THROMBECTOMY, LEFT LOWER EXTREMITY;  Surgeon: Teddy Huber MD;  Location: [facility] OR 2ND FLR;  Service: Vascular;  Laterality: Left;  14.5 min  1179.85 mGy  341.01 Gycm2  240 ml dye    THROMBECTOMY  10/22/2021    Procedure: THROMBECTOMY;  Surgeon: Saad Arenas MD;  Location: [facility] CATH LAB/EP;  Service: Cardiology;;       Review of patient's allergies indicates:   Allergen Reactions    Ciprofloxacin Itching    Contrast media      Kidney injury    Iodine      Kidney injury       Current Facility-Administered Medications on File Prior to Encounter   Medication    [DISCONTINUED] GENERIC EXTERNAL MEDICATION    [DISCONTINUED] GENERIC EXTERNAL MEDICATION     Current Outpatient Medications on File Prior to Encounter   Medication Sig    acetaminophen (TYLENOL) 500 MG tablet Take 2 tablets (1,000 mg total) by mouth every 8 (eight) hours as needed for Pain.    allopurinoL (ZYLOPRIM) 100 MG tablet Take 1 tablet (100 mg total) by mouth once daily.    atorvastatin (LIPITOR) 80 MG tablet Take 1 tablet (80 mg total)  by mouth every evening.    blood-glucose meter,continuous (DEXCOM G6 ) Misc Use to check blood sugars 4 times/day    blood-glucose sensor (DEXCOM G6 SENSOR) Radha Apply one sensor to skin every 10 days    blood-glucose transmitter (DEXCOM G6 TRANSMITTER) Radha Replace transmitter every 3 months to use with dexcom sensor    carvediloL (COREG) 3.125 MG tablet Take 1 tablet (3.125 mg total) by mouth 2 (two) times daily.    clopidogreL (PLAVIX) 75 mg tablet Take 1 tablet (75 mg total) by mouth once daily.    furosemide (LASIX) 40 MG tablet Take 1 tablet (40 mg total) by mouth once daily. Please hold until follow up with PCP    HYDROcodone-acetaminophen (NORCO) 5-325 mg per tablet Take 1 tablet by mouth every 4 (four) hours as needed for Pain.    insulin aspart U-100 (NOVOLOG FLEXPEN U-100 INSULIN) 100 unit/mL (3 mL) InPn pen Inject 5 Units into the skin 3 (three) times daily with meals. If not eating, ok to hold    insulin detemir U-100 (LEVEMIR FLEXTOUCH) 100 unit/mL (3 mL) SubQ InPn pen Inject 12 Units into the skin every evening.    magnesium oxide (MAG-OX) 400 mg (241.3 mg magnesium) tablet Take 1 tablet (400 mg total) by mouth 2 (two) times daily.    melatonin (MELATIN) 3 mg tablet Take 2 tablets (6 mg total) by mouth nightly as needed for Insomnia.    mirtazapine (REMERON) 7.5 MG Tab Take 2 tablets (15 mg total) by mouth nightly.    multivit/folic acid/vit K1 (ONE-A-DAY WOMEN'S 50 PLUS ORAL) Take 1 tablet by mouth Daily.    ondansetron (ZOFRAN-ODT) 8 MG TbDL Dissolve 1 tablet (8 mg total) by mouth every 8 (eight) hours as needed.    oxyCODONE-acetaminophen (PERCOCET)  mg per tablet Take 1 tablet by mouth every 6 (six) hours as needed.    rivaroxaban (XARELTO) 2.5 mg Tab Take 1 tablet (2.5 mg total) by mouth 2 (two) times daily with meals.    sertraline (ZOLOFT) 50 MG tablet Take 1 tablet (50 mg total) by mouth once daily.    sodium bicarbonate 650 MG tablet Take 1 tablet (650 mg total) by mouth 2 (two)  times daily. Please hold until follow up with Primary Care Provider    [DISCONTINUED] lancing device Misc 1 Device by Misc.(Non-Drug; Combo Route) route 2 (two) times daily with meals.     Family History       Problem Relation (Age of Onset)    Diabetes Mother, Father, Paternal Grandmother    Heart disease Maternal Grandmother    No Known Problems Maternal Grandfather, Paternal Grandfather          Tobacco Use    Smoking status: Former Smoker    Smokeless tobacco: Never Used   Substance and Sexual Activity    Alcohol use: No    Drug use: Yes     Types: Marijuana     Comment: occassional    Sexual activity: Yes     Partners: Male     Review of Systems   Constitutional:  Negative for activity change, appetite change, chills and fever.   HENT:  Negative for congestion, hearing loss and rhinorrhea.    Eyes:  Negative for discharge, itching and visual disturbance.   Respiratory:  Negative for apnea, cough and shortness of breath.    Cardiovascular:  Negative for chest pain, palpitations and leg swelling.   Gastrointestinal:  Positive for nausea and vomiting. Negative for abdominal distention, abdominal pain, constipation and diarrhea.   Endocrine: Negative for cold intolerance and heat intolerance.   Genitourinary:  Negative for dysuria and hematuria.   Musculoskeletal:  Negative for back pain, neck pain and neck stiffness.   Skin:  Negative for rash and wound.   Neurological:  Positive for syncope. Negative for dizziness, seizures, light-headedness and headaches.   Psychiatric/Behavioral:  Negative for agitation, confusion and suicidal ideas.    Objective:     Vital Signs (Most Recent):  Temp: 97.9 °F (36.6 °C) (03/11/22 2028)  Pulse: 95 (03/11/22 2200)  Resp: 16 (03/11/22 2200)  BP: (!) 127/55 (03/11/22 2200)  SpO2: 99 % (03/11/22 2200)   Vital Signs (24h Range):  Temp:  [97.9 °F (36.6 °C)] 97.9 °F (36.6 °C)  Pulse:  [70-95] 95  Resp:  [16] 16  SpO2:  [99 %-100 %] 99 %  BP: (123-127)/(55-62) 127/55        There is no  height or weight on file to calculate BMI.    Physical Exam  Vitals reviewed.   Constitutional:       General: She is not in acute distress.     Appearance: She is well-developed.   HENT:      Head: Normocephalic and atraumatic.      Nose: Nose normal. No rhinorrhea.      Mouth/Throat:      Mouth: Mucous membranes are moist.   Eyes:      General: No scleral icterus.        Right eye: No discharge.         Left eye: No discharge.      Pupils: Pupils are equal, round, and reactive to light.   Neck:      Vascular: No JVD.   Cardiovascular:      Rate and Rhythm: Normal rate and regular rhythm.      Heart sounds: Normal heart sounds. No murmur heard.    No friction rub.   Pulmonary:      Effort: Pulmonary effort is normal. No respiratory distress.      Breath sounds: Normal breath sounds. No wheezing.   Abdominal:      General: Bowel sounds are normal. There is no distension.      Palpations: Abdomen is soft.      Tenderness: There is no abdominal tenderness.   Musculoskeletal:         General: No deformity. Normal range of motion.      Cervical back: Normal range of motion and neck supple.      Comments: Left AKA, Right BKA   Skin:     General: Skin is warm and dry.   Neurological:      General: No focal deficit present.      Mental Status: She is alert and oriented to person, place, and time.   Psychiatric:         Mood and Affect: Mood normal.         Behavior: Behavior normal.         CRANIAL NERVES     CN III, IV, VI   Pupils are equal, round, and reactive to light.     Significant Labs: All pertinent labs within the past 24 hours have been reviewed.  CBC:   Recent Labs   Lab 03/11/22  2239   WBC 9.94   HGB 10.3*   HCT 32.8*        CMP:   Recent Labs   Lab 03/11/22  2239      K 5.1      CO2 18*   *   BUN 61*   CREATININE 2.1*   CALCIUM 9.1   PROT 8.2   ALBUMIN 2.6*   BILITOT 0.2   ALKPHOS 126   AST 11   ALT 6*   ANIONGAP 13   EGFRNONAA 25.9*       Significant Imaging: I have reviewed all  pertinent imaging results/findings within the past 24 hours.

## 2022-03-12 NOTE — CONSULTS
[facility] - Surgery  Infectious Disease  Consult Note    Patient Name: Suyapa Connelly  MRN: [id number]  Admission Date: 3/11/2022  Hospital Length of Stay: 0 days  Attending Physician: Hitesh Quijano MD  Primary Care Provider: Magen Christensen MD     Isolation Status: No active isolations    Patient information was obtained from patient, spouse/SO, past medical records and ER records.      Inpatient consult to Infectious Diseases  Consult performed by: Carline Schwarz MD  Consult ordered by: Dav Mao MD        Assessment/Plan:     * Infection of amputation stump of right lower extremity  57 y/o female with Hx of CAD s/p CABG, DM2 c/b DM foot/osteo s/p left AKA and right BKA 12/2021 with stump revision 1/22 c/b wound infection (ESBL klebsiella/pseudomonas) s/p meropenem x 2 weeks with ETD 2/7 ( CRP 2/3 ~83), recent admission for AMS and myoclonus 2/2 ROSLYN and medication SE (pregabalin/ mirtazapine)- Now presenting after syncope episode - She has been afebrile - wbc ~9 - CRP ~24 (decreased). She was started on vanc/zosyn    Recommendations:    1. At this time would hold off Abx as presentation less concerning for infected stump.  2. Agree with MRI to evaluate for chronic osteo- if there is a concern for bone infection on images, will need bone biopsy to further direct Abx.  3. Wound care consult.  4. Will follow up cultures.  5. Syncope w/u per primary.        Thank you for your consult. I will follow-up with patient. Please contact us if you have any additional questions.    Carline Schwarz MD  Infectious Disease  [facility] - Surgery    Subjective:     Principal Problem: Infection of amputation stump of right lower extremity    HPI: Suyapa Connelly is 57 y/o female with Hx of CAD s/p CABG, DM2 c/b DM foot/osteo s/p left AKA and right BKA 12/2021 with stump revision 1/22 c/b wound infection (ESBL klebsiella/pseudomonas) s/p meropenem x 2 weeks with ETD 2/7 ( CRP 2/3 ~83), recent admission for AMS  and myoclonus 2/2 ROSLYN and medication SE (pregabalin/ mirtazapine) - Now presented with episode of syncope last few secs per her  - patient now back to baseline.    Patient and her  report that she was feeling nauseous (chronic nausea) and she became unresponsive for few secs with eyes opened no jerky movements. She denies any fever, chills, abdominal pain, dysuria or drainage from stump - she denies pain in the stump and  been doing her dressing.    ID consulted for concern for stump infection.    Patient did not tolerate cipro 2/2 to itching but did tolerate levofloxacin.      Past Medical History:   Diagnosis Date    Anxiety     Chronic pain syndrome     CKD (chronic kidney disease), stage III     Depression     Diabetes mellitus     type 2    Diabetes mellitus, type 2     GERD (gastroesophageal reflux disease)     Hyperemesis 3/23/2021    Hyperlipemia     Hypertension     Hypokalemia 3/23/2021    Myocardial infarction 2010    minor-caused by stress per pt.    Osteomyelitis     Osteomyelitis of left foot 4/30/2021    PVD (peripheral vascular disease)     Vaginal delivery     x1       Past Surgical History:   Procedure Laterality Date    ABOVE-KNEE AMPUTATION Left 5/18/2021    Procedure: AMPUTATION, ABOVE KNEE;  Surgeon: Teddy Huber MD;  Location: [facility] OR [address] [place] [address];  Service: Vascular;  Laterality: Left;    Angiogram - Right Extremity Right 7/9/15    angiogram-left leg  10/6/15    ANGIOGRAPHY OF LOWER EXTREMITY Left 4/29/2021    Procedure: ANGIOGRAM, LOWER EXTREMITY;  Surgeon: Teddy Huber MD;  Location: [address];  Service: Vascular;  Laterality: Left;    BELOW KNEE AMPUTATION OF LOWER EXTREMITY Right 12/28/2021    Procedure: AMPUTATION, BELOW KNEE;  Surgeon: Kaitlyn Rojas MD;  Location: [facility] OR;  Service: General;  Laterality: Right;    CATHETERIZATION OF BOTH LEFT AND RIGHT HEART N/A 12/18/2019    Procedure: CATHETERIZATION, HEART, BOTH LEFT  AND RIGHT;  Surgeon: Que Fernando III, MD;  Location: [facility] CATH LAB;  Service: Cardiology;  Laterality: N/A;    CORONARY ANGIOGRAPHY N/A 12/18/2019    Procedure: ANGIOGRAM, CORONARY ARTERY;  Surgeon: Que Fernando III, MD;  Location: [facility] CATH LAB;  Service: Cardiology;  Laterality: N/A;    CORONARY ANGIOGRAPHY INCLUDING BYPASS GRAFTS WITH CATHETERIZATION OF LEFT HEART N/A 7/28/2020    Procedure: ANGIOGRAM, CORONARY, INCLUDING BYPASS GRAFT, WITH LEFT HEART CATHETERIZATION, 9 am;  Surgeon: Rachel Easley MD;  Location: [facility] CATH LAB;  Service: Cardiology;  Laterality: N/A;    CORONARY ARTERY BYPASS GRAFT (CABG) N/A 1/14/2020    Procedure: CORONARY ARTERY BYPASS GRAFT (CABG) x 1     Off Pump;  Surgeon: Huang Altamirano MD;  Location: [facility] OR [address] [place] [address];  Service: Cardiovascular;  Laterality: N/A;    CREATION OF FEMORAL-TIBIAL ARTERY BYPASS Left 4/29/2021    Procedure: CREATION, BYPASS, ARTERIAL, FEMORAL TO ANTERIOR TIBIAL;  Surgeon: Teddy Huber MD;  [facility];  Service: Vascular;  Laterality: Left;    CREATION OF FEMOROPOPLITEAL ARTERIAL BYPASS USING GRAFT Left 8/18/2020    Procedure: CREATION, BYPASS, ARTERIAL, FEMORAL TO POPLITEAL, USING GRAFT, LEFT LOWER EXTREMITY;  Surgeon: Teddy Huber MD;  Location: [facility];  Service: Vascular;  Laterality: Left;  REQUEST 7:15 A.M. START----COVID NEGATIVE ON 8/17  1ST CASE STARTE PER LEANA ON 8/7/2020 @ 942AM-LO  RN PREOP 8/12/2020   T/S-----CLEARED BY CARDS-------PENDING INSURANCE    DEBRIDEMENT OF FOOT Left 9/8/2020    Procedure: DEBRIDEMENT, FOOT;  Surgeon: Rosio Mayes DPM;  Location: [facility] OR;  Service: Podiatry;  Laterality: Left;  request neoxx .   RN Pre Op 9-4-2020, Covid negative on 9/5/20. C A    DEBRIDEMENT OF FOOT  3/4/2021    Procedure: DEBRIDEMENT, FOOT;  Surgeon: Teddy Huber MD;  Location: [facility] OR;  Service: Vascular;;    DEBRIDEMENT OF FOOT Left 3/9/2021    Procedure: DEBRIDEMENT, FOOT, bone  biopsy;  Surgeon: Rosio Mayes DPM;  Location: [facility] OR;  Service: Podiatry;  Laterality: Left;  Request neoxx---COVID IN AM  REQUESTING NOON START  RN Phone Pre op.On Blood thinners Plavix and Eliquis.  Covid am of surgery. C A    DEBRIDEMENT OF FOOT Left 5/4/2021    Procedure: DEBRIDEMENT, FOOT;  Surgeon: Farooq Morley DPM;  Location: [facility] OR 2ND FLR;  Service: Podiatry;  Laterality: Left;    INSERTION OF TUNNELED CENTRAL VENOUS HEMODIALYSIS CATHETER N/A 1/27/2020    Procedure: Insertion, Catheter, Central Venous, Hemodialysis;  Surgeon: ESTEBAN Gomez III, MD;  Location: [facility] CATH LAB;  Service: Peripheral Vascular;  Laterality: N/A;    PERCUTANEOUS TRANSLUMINAL ANGIOPLASTY N/A 3/4/2021    Procedure: PTA (ANGIOPLASTY, PERCUTANEOUS, TRANSLUMINAL);  Surgeon: Teddy Huber MD;  Location: [facility] OR;  Service: Vascular;  Laterality: N/A;    REMOVAL OF ARTERIOVENOUS GRAFT Left 5/27/2021    Procedure: REMOVAL, GRAFT, LEFT LOWER EXTREMITY, WOUND EXPLORATION;  Surgeon: Teddy Huber MD;  [facility];  Service: Vascular;  Laterality: Left;    REMOVAL OF NAIL OF DIGIT Left 3/9/2021    Procedure: REMOVAL, NAIL, DIGIT;  Surgeon: Rosio Mayes DPM;  Location: [facility] OR;  Service: Podiatry;  Laterality: Left;    THROMBECTOMY Left 3/4/2021    Procedure: THROMBECTOMY, LEFT LOWER EXTREMITY BYPASS GRAFT, ANGIOGRAM, POSSIBLE INTERVENTION, POSSIBLE LEFT LOWER EXTREMITY BYPASS;  Surgeon: Teddy Huber MD;  Location: [facility] OR;  Service: Vascular;  Laterality: Left;    THROMBECTOMY Left 4/29/2021    Procedure: GRAFT THROMBECTOMY, LEFT LOWER EXTREMITY;  Surgeon: Teddy Huber MD;  Location: [facility] OR 2ND FLR;  Service: Vascular;  Laterality: Left;  14.5 min  1179.85 mGy  341.01 Gycm2  240 ml dye    THROMBECTOMY  10/22/2021    Procedure: THROMBECTOMY;  Surgeon: Saad Arenas MD;  Location: [facility] CATH LAB/EP;  Service: Cardiology;;       Review of patient's allergies indicates:    Allergen Reactions    Ciprofloxacin Itching    Contrast media      Kidney injury    Iodine      Kidney injury       Medications:  Medications Prior to Admission   Medication Sig    acetaminophen (TYLENOL) 500 MG tablet Take 2 tablets (1,000 mg total) by mouth every 8 (eight) hours as needed for Pain.    allopurinoL (ZYLOPRIM) 100 MG tablet Take 1 tablet (100 mg total) by mouth once daily.    atorvastatin (LIPITOR) 80 MG tablet Take 1 tablet (80 mg total) by mouth every evening.    blood-glucose meter,continuous (DEXCOM G6 ) Misc Use to check blood sugars 4 times/day    blood-glucose sensor (DEXCOM G6 SENSOR) Radha Apply one sensor to skin every 10 days    blood-glucose transmitter (DEXCOM G6 TRANSMITTER) Radha Replace transmitter every 3 months to use with dexcom sensor    carvediloL (COREG) 3.125 MG tablet Take 1 tablet (3.125 mg total) by mouth 2 (two) times daily.    clopidogreL (PLAVIX) 75 mg tablet Take 1 tablet (75 mg total) by mouth once daily.    furosemide (LASIX) 40 MG tablet Take 1 tablet (40 mg total) by mouth once daily. Please hold until follow up with PCP    HYDROcodone-acetaminophen (NORCO) 5-325 mg per tablet Take 1 tablet by mouth every 4 (four) hours as needed for Pain.    insulin aspart U-100 (NOVOLOG FLEXPEN U-100 INSULIN) 100 unit/mL (3 mL) InPn pen Inject 5 Units into the skin 3 (three) times daily with meals. If not eating, ok to hold    insulin detemir U-100 (LEVEMIR FLEXTOUCH) 100 unit/mL (3 mL) SubQ InPn pen Inject 12 Units into the skin every evening.    magnesium oxide (MAG-OX) 400 mg (241.3 mg magnesium) tablet Take 1 tablet (400 mg total) by mouth 2 (two) times daily.    melatonin (MELATIN) 3 mg tablet Take 2 tablets (6 mg total) by mouth nightly as needed for Insomnia.    mirtazapine (REMERON) 7.5 MG Tab Take 2 tablets (15 mg total) by mouth nightly.    multivit/folic acid/vit K1 (ONE-A-DAY WOMEN'S 50 PLUS ORAL) Take 1 tablet by mouth Daily.    ondansetron  (ZOFRAN-ODT) 8 MG TbDL Dissolve 1 tablet (8 mg total) by mouth every 8 (eight) hours as needed.    oxyCODONE-acetaminophen (PERCOCET)  mg per tablet Take 1 tablet by mouth every 6 (six) hours as needed.    rivaroxaban (XARELTO) 2.5 mg Tab Take 1 tablet (2.5 mg total) by mouth 2 (two) times daily with meals.    sertraline (ZOLOFT) 50 MG tablet Take 1 tablet (50 mg total) by mouth once daily.    sodium bicarbonate 650 MG tablet Take 1 tablet (650 mg total) by mouth 2 (two) times daily. Please hold until follow up with Primary Care Provider     Antibiotics (From admission, onward)                None          Antifungals (From admission, onward)                None          Antivirals (From admission, onward)      None             Immunization History   Administered Date(s) Administered    COVID-19, MRNA, LN-S, PF (Pfizer) (Purple Cap) 03/09/2021, 03/30/2021       Family History       Problem Relation (Age of Onset)    Diabetes Mother, Father, Paternal Grandmother    Heart disease Maternal Grandmother    No Known Problems Maternal Grandfather, Paternal Grandfather          Social History     Socioeconomic History    Marital status:    Occupational History    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      Social Determinants of Health     Financial Resource Strain: High Risk    Difficulty of Paying Living Expenses: Hard   Food Insecurity: No Food Insecurity    Worried About Running Out of Food in the Last Year: Never true    Ran Out of Food in the Last Year: Never true   Transportation Needs: No Transportation Needs    Lack of Transportation (Medical): No    Lack of Transportation (Non-Medical): No   Stress: Stress  Concern Present    Feeling of Stress : To some extent   Social Connections: Unknown    Frequency of Communication with Friends and Family: More than three times a week    Frequency of Social Gatherings with Friends and Family: Three times a week    Marital Status:    Housing Stability: Low Risk     Unable to Pay for Housing in the Last Year: No    Number of Places Lived in the Last Year: 1    Unstable Housing in the Last Year: No     Review of Systems   Constitutional:  Positive for activity change. Negative for appetite change, chills and fever.   HENT:  Negative for congestion.    Eyes:  Negative for pain and itching.   Respiratory:  Negative for cough, chest tightness and shortness of breath.    Cardiovascular:  Negative for palpitations and leg swelling.   Gastrointestinal:  Positive for nausea. Negative for abdominal pain.   Endocrine: Negative for polyphagia and polyuria.   Genitourinary:  Negative for dysuria and frequency.   Musculoskeletal:  Negative for back pain.   Skin:  Positive for wound.   Neurological:  Positive for syncope. Negative for numbness and headaches.   Psychiatric/Behavioral:  Negative for agitation and behavioral problems.    Objective:     Vital Signs (Most Recent):  Temp: 96.6 °F (35.9 °C) (03/12/22 1040)  Pulse: 78 (03/12/22 1040)  Resp: 15 (03/12/22 1040)  BP: (!) 148/72 (03/12/22 1040)  SpO2: 99 % (03/12/22 1040)   Vital Signs (24h Range):  Temp:  [96.6 °F (35.9 °C)-98.1 °F (36.7 °C)] 96.6 °F (35.9 °C)  Pulse:  [70-96] 78  Resp:  [13-20] 15  SpO2:  [99 %-100 %] 99 %  BP: (123-169)/(55-81) 148/72     Weight: 65.3 kg (144 lb)  Body mass index is 23.96 kg/m².    Estimated Creatinine Clearance: 27.2 mL/min (A) (based on SCr of 2.1 mg/dL (H)).    Physical Exam  Constitutional:       Appearance: Normal appearance.   HENT:      Head: Normocephalic and atraumatic.   Cardiovascular:      Rate and Rhythm: Normal rate.   Pulmonary:      Effort: Pulmonary effort is normal.    Abdominal:      General: Abdomen is flat.      Palpations: Abdomen is soft.   Musculoskeletal:      Comments: Left AKA- Right BKA with open wound    Skin:     General: Skin is warm and dry.   Neurological:      Mental Status: She is alert and oriented to person, place, and time.   Psychiatric:         Behavior: Behavior normal.         Significant Labs: All pertinent labs within the past 24 hours have been reviewed.    Significant Imaging: I have reviewed all pertinent imaging results/findings within the past 24 hours.

## 2022-03-12 NOTE — PROGRESS NOTES
Pharmacokinetic Initial Assessment: IV Vancomycin    Assessment/Plan:    Initiate intravenous vancomycin with loading dose of 1000 mg once with subsequent doses when random concentrations are less than 20 mcg/mL  Desired empiric serum trough concentration is 15 to 20 mcg/mL  Draw vancomycin random level on 3/12 at 2300.  Pharmacy will continue to follow and monitor vancomycin.      Please contact pharmacy at extension [phone] with any questions regarding this assessment.     Thank you for the consult,   Ruma Polanco       Patient brief summary:  Suyapa Connelly is a 56 y.o. female initiated on antimicrobial therapy with IV Vancomycin for treatment of suspected SSTI    Drug Allergies:   Review of patient's allergies indicates:   Allergen Reactions    Ciprofloxacin Itching    Contrast media      Kidney injury    Iodine      Kidney injury       Renal Function:   CrCl cannot be calculated (Unknown ideal weight.).,     Dialysis Method (if applicable):  N/A    CBC (last 72 hours):  Recent Labs   Lab Result Units 03/11/22  2239   WBC K/uL 9.94   Hemoglobin g/dL 10.3*   Hematocrit % 32.8*   Platelets K/uL 426   Gran % % 63.5   Lymph % % 24.9   Mono % % 7.5   Eosinophil % % 3.2   Basophil % % 0.7   Differential Method  Automated       Metabolic Panel (last 72 hours):  Recent Labs   Lab Result Units 03/11/22  2239   Sodium mmol/L 136   Potassium mmol/L 5.1   Chloride mmol/L 105   CO2 mmol/L 18*   Glucose mg/dL 220*   BUN mg/dL 61*   Creatinine mg/dL 2.1*   Albumin g/dL 2.6*   Total Bilirubin mg/dL 0.2   Alkaline Phosphatase U/L 126   AST U/L 11   ALT U/L 6*       Drug levels (last 3 results):  No results for input(s): VANCOMYCINRA, VANCOMYCINPE, VANCOMYCINTR in the last 72 hours.    Microbiologic Results:  Microbiology Results (last 7 days)     Procedure Component Value Units Date/Time    Blood culture #1 **CANNOT BE ORDERED STAT** [[id number]] Collected: 03/12/22 0050    Order Status: Sent Specimen: Blood from  Peripheral, Antecubital, Left Updated: 03/12/22 0058    Blood culture #2 **CANNOT BE ORDERED STAT** [[id number]] Collected: 03/12/22 0050    Order Status: Sent Specimen: Blood from Peripheral, Antecubital, Right Updated: 03/12/22 0058

## 2022-03-12 NOTE — HPI
57 yo female with CAD s/p CABG, CKD3, DM2, osteomyelitis with previous left AKA and newer Right BKA (12/2021), and recent hospitalization for sepsis and wound infection presenting for nausea, syncope, vomitting. Symptoms resolved about 15 seconds and returned to baseline afterwards. She ultimately denies any current symptom; however she does report that she has been having trouble following up about her BKA and cleaning it at home. There is some purulent drainage from the site of her BKA however.

## 2022-03-12 NOTE — PLAN OF CARE
Pt is AAOx4. VSS. No falls/injury as pt calls for assistance when needed. No s/s of skin breakdown as pt positions with assistance x 2. Pain being monitored and controled with PRN and scheduled meds. Antibiotics administered per order. Bed in lowest position. Call light within reach. Will continue to monitor.

## 2022-03-12 NOTE — PLAN OF CARE
Hospital Medicine Plan of Care Note    Admission H&P dated earlier this morning reviewed, and agree with assessment and plan as documented. Pt seen and examined this morning on rounds, PEE.             Continue vanc and zosyn. ID and surgery consulted, appreciate assistance. Wound care also to follow. Awaiting further recommendations and continuing current plan.    Hitesh Quijano MD  Attending Physician  Department of Hospital Medicine  Epic secure chat preferred, or Spect[employer]Link ext. [phone]  3/12/2022

## 2022-03-12 NOTE — ED NOTES
Pt is sleeping. NAD noted, VSS. BP cuff and pulse ox alarms are set. Bed low and locked, side rails up x2. Call light within reach of pt. Family member at bedside updated on POC. Will continue to monitor.

## 2022-03-12 NOTE — ASSESSMENT & PLAN NOTE
Morbid obesity complicates all aspects of disease management from diagnostic modalities to treatment. Weight loss encouraged and health benefits explained to patient.

## 2022-03-12 NOTE — HPI
57 yo F with multiple medical co-morbidities and an extensive past surgical history most significant for prior L AKA and recent R BKA with subsequent revision for poor healing ([place]- Jan 2022) both secondary to PAD. Patient is currently admitted to the hospital medicine service with osteomyelitis of the R BKA stump with break down of the wound/poor healing. ID is following for antibiotic recommendations. Vascular Surgery was consulted for evaluation of the extremity for possible revision to AKA given the ongoing infection and poor healing. Patient would like to have procedure with our vascular team rather than following up in [place] with her previous surgeon.

## 2022-03-12 NOTE — ED PROVIDER NOTES
"Encounter Date: 3/11/2022       History     Chief Complaint   Patient presents with    Loss of Consciousness     Per Pt.'s  she "passed out and went limp" in bed around 3 hours ago. Pt is AAOx4, GCS of 15 at this time. Pt c/o of nausea at this time. PMH of CABG.      HPI   56 year old female history of CAD s/p CABG, CKD3, DMII, osteomyelitis with left AKA and more recently right BKA in 12/2021, multiple wound infections, hospitalized recently for sepsis in jan 2022. Dishcarged to rehab, now back home. Today  reports she was sitting in bed, stated she felt nausea,  brought her something to throw up in, then she subsequently passed out, was out for approx 15 seconds before coming to. Denies any preceding symptoms other than n/v. She currently denies any chest pain, trouble breathing, fevers, chills, abdominal pain, numbness, weakness, changes in vision. States they recently moved and she has beeen unable to follow up with wound care regarding her recent BKA, has been cleaning it at home. Endorses some pain to the BKA, some purulent drainage.     Review of patient's allergies indicates:   Allergen Reactions    Ciprofloxacin Itching    Contrast media      Kidney injury    Iodine      Kidney injury     Past Medical History:   Diagnosis Date    Anxiety     Chronic pain syndrome     CKD (chronic kidney disease), stage III     Depression     Diabetes mellitus     type 2    Diabetes mellitus, type 2     GERD (gastroesophageal reflux disease)     Hyperemesis 3/23/2021    Hyperlipemia     Hypertension     Hypokalemia 3/23/2021    Myocardial infarction 2010    minor-caused by stress per pt.    Osteomyelitis     Osteomyelitis of left foot 4/30/2021    PVD (peripheral vascular disease)     Vaginal delivery     x1     Past Surgical History:   Procedure Laterality Date    ABOVE-KNEE AMPUTATION Left 5/18/2021    Procedure: AMPUTATION, ABOVE KNEE;  Surgeon: Teddy Huber MD;  [facility];  Service: Vascular;  Laterality: Left;    Angiogram - Right Extremity Right 7/9/15    angiogram-left leg  10/6/15    ANGIOGRAPHY OF LOWER EXTREMITY Left 4/29/2021    Procedure: ANGIOGRAM, LOWER EXTREMITY;  Surgeon: Teddy Huber MD;  Location: [facility] OR [address] [place] [address];  Service: Vascular;  Laterality: Left;    BELOW KNEE AMPUTATION OF LOWER EXTREMITY Right 12/28/2021    Procedure: AMPUTATION, BELOW KNEE;  Surgeon: Kaitlyn Rojas MD;  Location: [facility] OR;  Service: General;  Laterality: Right;    CATHETERIZATION OF BOTH LEFT AND RIGHT HEART N/A 12/18/2019    Procedure: CATHETERIZATION, HEART, BOTH LEFT AND RIGHT;  Surgeon: Que Fernando III, MD;  Location: [facility] CATH LAB;  Service: Cardiology;  Laterality: N/A;    CORONARY ANGIOGRAPHY N/A 12/18/2019    Procedure: ANGIOGRAM, CORONARY ARTERY;  Surgeon: Que Fernando III, MD;  Location: [facility] CATH LAB;  Service: Cardiology;  Laterality: N/A;    CORONARY ANGIOGRAPHY INCLUDING BYPASS GRAFTS WITH CATHETERIZATION OF LEFT HEART N/A 7/28/2020    Procedure: ANGIOGRAM, CORONARY, INCLUDING BYPASS GRAFT, WITH LEFT HEART CATHETERIZATION, 9 am;  Surgeon: Rachel Easley MD;  Location: [facility] CATH LAB;  Service: Cardiology;  Laterality: N/A;    CORONARY ARTERY BYPASS GRAFT (CABG) N/A 1/14/2020    Procedure: CORONARY ARTERY BYPASS GRAFT (CABG) x 1     Off Pump;  Surgeon: Huang Altamirano MD;  Location: [address];  Service: Cardiovascular;  Laterality: N/A;    CREATION OF FEMORAL-TIBIAL ARTERY BYPASS Left 4/29/2021    Procedure: CREATION, BYPASS, ARTERIAL, FEMORAL TO ANTERIOR TIBIAL;  Surgeon: Teddy Huber MD;  Location: [address];  Service: Vascular;  Laterality: Left;    CREATION OF FEMOROPOPLITEAL ARTERIAL BYPASS USING GRAFT Left 8/18/2020    Procedure: CREATION, BYPASS, ARTERIAL, FEMORAL TO POPLITEAL, USING GRAFT, LEFT LOWER EXTREMITY;  Surgeon: Teddy Huber MD;  Location: [facility] OR;  Service: Vascular;  Laterality:  Left;  REQUEST 7:15 A.M. START----COVID NEGATIVE ON 8/17  1ST CASE STARTE PER LEANA ON 8/7/2020 @ 942AM-LO  RN PREOP 8/12/2020   T/S-----CLEARED BY CARDS-------PENDING INSURANCE    DEBRIDEMENT OF FOOT Left 9/8/2020    Procedure: DEBRIDEMENT, FOOT;  Surgeon: Rosio Mayes DPM;  Location: [facility] OR;  Service: Podiatry;  Laterality: Left;  request neoxx .   RN Pre Op 9-4-2020, Covid negative on 9/5/20. C A    DEBRIDEMENT OF FOOT  3/4/2021    Procedure: DEBRIDEMENT, FOOT;  Surgeon: Teddy Huber MD;  Location: [facility] OR;  Service: Vascular;;    DEBRIDEMENT OF FOOT Left 3/9/2021    Procedure: DEBRIDEMENT, FOOT, bone biopsy;  Surgeon: Rosio Mayes DPM;  Location: [facility] OR;  Service: Podiatry;  Laterality: Left;  Request neoxx---COVID IN AM  REQUESTING NOON START  RN Phone Pre op.On Blood thinners Plavix and Eliquis.  Covid am of surgery. C A    DEBRIDEMENT OF FOOT Left 5/4/2021    Procedure: DEBRIDEMENT, FOOT;  Surgeon: Farooq Morley DPM;  [facility];  Service: Podiatry;  Laterality: Left;    INSERTION OF TUNNELED CENTRAL VENOUS HEMODIALYSIS CATHETER N/A 1/27/2020    Procedure: Insertion, Catheter, Central Venous, Hemodialysis;  Surgeon: ESTEBAN Gomez III, MD;  Location: [facility] CATH LAB;  Service: Peripheral Vascular;  Laterality: N/A;    PERCUTANEOUS TRANSLUMINAL ANGIOPLASTY N/A 3/4/2021    Procedure: PTA (ANGIOPLASTY, PERCUTANEOUS, TRANSLUMINAL);  Surgeon: Teddy Huber MD;  Location: [facility] OR;  Service: Vascular;  Laterality: N/A;    REMOVAL OF ARTERIOVENOUS GRAFT Left 5/27/2021    Procedure: REMOVAL, GRAFT, LEFT LOWER EXTREMITY, WOUND EXPLORATION;  Surgeon: Teddy Huber MD;  Location: [facility] OR 2ND FLR;  Service: Vascular;  Laterality: Left;    REMOVAL OF NAIL OF DIGIT Left 3/9/2021    Procedure: REMOVAL, NAIL, DIGIT;  Surgeon: Rosio Mayes DPM;  Location: [facility] OR;  Service: Podiatry;  Laterality: Left;    THROMBECTOMY Left 3/4/2021    Procedure: THROMBECTOMY,  LEFT LOWER EXTREMITY BYPASS GRAFT, ANGIOGRAM, POSSIBLE INTERVENTION, POSSIBLE LEFT LOWER EXTREMITY BYPASS;  Surgeon: Teddy Huber MD;  Location: [facility] OR;  Service: Vascular;  Laterality: Left;    THROMBECTOMY Left 4/29/2021    Procedure: GRAFT THROMBECTOMY, LEFT LOWER EXTREMITY;  Surgeon: Teddy Huber MD;  Location: [facility] OR 2ND FLR;  Service: Vascular;  Laterality: Left;  14.5 min  1179.85 mGy  341.01 Gycm2  240 ml dye    THROMBECTOMY  10/22/2021    Procedure: THROMBECTOMY;  Surgeon: Saad Arenas MD;  [facility] C[facility] LAB[facility]EP;  Service: Cardiology;;     Family History   Problem Relation Age of Onset    Diabetes Mother     Diabetes Father     Heart disease Maternal Grandmother     No Known Problems Maternal Grandfather     Diabetes Paternal Grandmother     No Known Problems Paternal Grandfather     Anesthesia problems Neg Hx      Social History     Tobacco Use    Smoking status: Former Smoker    Smokeless tobacco: Never Used   Substance Use Topics    Alcohol use: No    Drug use: Yes     Types: Marijuana     Comment: occassional     Review of Systems   Constitutional: Negative for fever.   HENT: Negative for sore throat.    Respiratory: Negative for shortness of breath.    Cardiovascular: Negative for chest pain.   Gastrointestinal: Positive for nausea and vomiting. Negative for abdominal pain.   Genitourinary: Negative for dysuria.   Musculoskeletal: Negative for back pain.   Skin: Negative for rash.   Neurological: Negative for weakness.   Hematological: Does not bruise/bleed easily.       Physical Exam     Initial Vitals [03/11/22 2028]   BP Pulse Resp Temp SpO2   123/62 70 16 97.9 °F (36.6 °C) 100 %      MAP       --         Physical Exam    Nursing note and vitals reviewed.  Constitutional:   Chronically ill appearing   HENT:   Head: Normocephalic and atraumatic.   Eyes: EOM are normal. Pupils are equal, round, and reactive to light.   Neck:   Normal range of  motion.  Cardiovascular: Normal rate and regular rhythm.   Pulmonary/Chest: Breath sounds normal. No respiratory distress. She has no wheezes.   Abdominal: Abdomen is soft. She exhibits no distension. There is no abdominal tenderness. There is no rebound and no guarding.   Musculoskeletal:         General: No tenderness or edema.      Cervical back: Normal range of motion.      Comments: Left AKA healed  Right BKA - loosely approximated sutures with granulation tissue, some purulence, no tenderness, no crepitus, no foul smell, no warmth or induration      Neurological: She is alert and oriented to person, place, and time.   Skin: Skin is warm. Capillary refill takes less than 2 seconds.   Psychiatric: She has a normal mood and affect.             ED Course   Procedures  Labs Reviewed   CBC W/ AUTO DIFFERENTIAL - Abnormal; Notable for the following components:       Result Value    RBC 3.45 (*)     Hemoglobin 10.3 (*)     Hematocrit 32.8 (*)     MCHC 31.4 (*)     RDW 16.7 (*)     All other components within normal limits   COMPREHENSIVE METABOLIC PANEL - Abnormal; Notable for the following components:    CO2 18 (*)     Glucose 220 (*)     BUN 61 (*)     Creatinine 2.1 (*)     Albumin 2.6 (*)     ALT 6 (*)     eGFR if  29.9 (*)     eGFR if non  25.9 (*)     All other components within normal limits   SEDIMENTATION RATE - Abnormal; Notable for the following components:    Sed Rate 95 (*)     All other components within normal limits   C-REACTIVE PROTEIN - Abnormal; Notable for the following components:    CRP 24.3 (*)     All other components within normal limits   D DIMER, QUANTITATIVE - Abnormal; Notable for the following components:    D-Dimer 0.62 (*)     All other components within normal limits   CULTURE, BLOOD   CULTURE, BLOOD   TROPONIN I   B-TYPE NATRIURETIC PEPTIDE   LACTIC ACID, PLASMA   TROPONIN I   URINALYSIS, REFLEX TO URINE CULTURE   COMPREHENSIVE METABOLIC PANEL    MAGNESIUM   PHOSPHORUS   CBC W/ AUTO DIFFERENTIAL   SARS-COV-2 RDRP GENE          Imaging Results          X-Ray Knee 1 or 2 View Right (Final result)  Result time 03/12/22 00:47:34   Procedure changed from X-Ray Knee 3 View Right     Final result by Jovan Persaud MD (03/12/22 00:47:34)                 Impression:      Status post right below-knee amputation.  The margins of the tibia and fibula at the site of amputation appear sharp with no bone destruction identified.  No gas in the soft tissues or radiopaque foreign body identified at the stump.      Electronically signed by: Jovan Persaud MD  Date:    03/12/2022  Time:    00:47             Narrative:    EXAMINATION:  XR KNEE 1 OR 2 VIEW RIGHT    CLINICAL HISTORY:  Osteomyelitis;  Osteomyelitis, unspecified    TECHNIQUE:  AP and lateral views of right knee    COMPARISON:  None.    FINDINGS:  Status post right below-knee amputation.  The margins of the tibia and fibula at the site of amputation appear sharp with no bone destruction identified.  No gas in the soft tissues or radiopaque foreign body identified at the stump.  No acute fracture or dislocation.  No suprapatellar effusion identified.  Moderate degenerative narrowing of the medial tibiofemoral compartment.  Vascular stent identified.  Vascular calcifications identified.                               X-Ray Chest AP Portable (Final result)  Result time 03/11/22 23:16:29    Final result by Monik Lara MD (03/11/22 23:16:29)                 Impression:      Please see above.      Electronically signed by: Monik Lara MD  Date:    03/11/2022  Time:    23:16             Narrative:    EXAMINATION:  XR CHEST AP PORTABLE    CLINICAL HISTORY:  Chest Pain;    TECHNIQUE:  Single frontal view of the chest was performed.    COMPARISON:  01/31/2022    FINDINGS:  Cardiac monitoring leads overlie the chest.  There is postoperative change of prior median sternotomy.  Allowing for differences in patient  positioning/technique, the cardiomediastinal silhouette is unchanged in size and configuration.  The lungs are symmetrically expanded with coarse interstitial attenuation, unchanged.  There is a linear opacity in the left lower lung zone suggestive of atelectasis and/or scarring, also similar to prior study.  There is no evidence of large volume of pleural fluid or pneumothorax.  Osseous structures are intact.  There is slight gaseous distention of the stomach.                                 Medications   allopurinoL tablet 100 mg (has no administration in time range)   atorvastatin tablet 80 mg (has no administration in time range)   carvediloL tablet 3.125 mg (has no administration in time range)   clopidogreL tablet 75 mg (has no administration in time range)   furosemide tablet 40 mg (has no administration in time range)   HYDROcodone-acetaminophen 5-325 mg per tablet 1 tablet (has no administration in time range)   insulin detemir U-100 pen 9 Units (has no administration in time range)   insulin aspart U-100 pen 4 Units (has no administration in time range)   mirtazapine tablet 15 mg (has no administration in time range)   rivaroxaban tablet 2.5 mg (has no administration in time range)   sertraline tablet 50 mg (has no administration in time range)   sodium bicarbonate tablet 650 mg (has no administration in time range)   sodium chloride 0.9% flush 10 mL (has no administration in time range)   albuterol-ipratropium 2.5 mg-0.5 mg/3 mL nebulizer solution 3 mL (has no administration in time range)   melatonin tablet 6 mg (has no administration in time range)   ondansetron disintegrating tablet 4 mg (has no administration in time range)   prochlorperazine injection Soln 5 mg (has no administration in time range)   polyethylene glycol packet 17 g (has no administration in time range)   acetaminophen tablet 650 mg (has no administration in time range)   simethicone chewable tablet 80 mg (has no administration in  time range)   naloxone 0.4 mg/mL injection 0.02 mg (has no administration in time range)   glucose chewable tablet 16 g (has no administration in time range)   glucose chewable tablet 24 g (has no administration in time range)   dextrose 10% bolus 125 mL (has no administration in time range)   dextrose 10% bolus 250 mL (has no administration in time range)   glucagon (human recombinant) injection 1 mg (has no administration in time range)   insulin aspart U-100 pen 0-5 Units (has no administration in time range)   piperacillin-tazobactam 4.5 g in sodium chloride 0.9% 100 mL IVPB (ready to mix system) (has no administration in time range)   vancomycin - pharmacy to dose (has no administration in time range)   0.9%  NaCl infusion (has no administration in time range)   ondansetron injection 4 mg (4 mg Intravenous Given 3/11/22 2240)   sodium chloride 0.9% bolus 1,000 mL (0 mLs Intravenous Stopped 3/12/22 0237)   vancomycin in dextrose 5 % 1 gram/250 mL IVPB 1,000 mg (0 mg/kg × 65.3 kg (Order-Specific) Intravenous Stopped 3/12/22 0308)   piperacillin-tazobactam 4.5 g in sodium chloride 0.9% 100 mL IVPB (ready to mix system) (0 g Intravenous Stopped 3/12/22 0350)     Medical Decision Making:   Initial Assessment:    56 year old female hx of CABG, hx of osteo, BKA, AKA, DMII, CKD who presents with syncopal episode. No chest pain, no shortness of breath. Right BKA in December complicated by infection. On presentation she's afebrile, she is nontoxic appearing, no shock index. On exam shes neurologically intact with no focal deficits, lungs CTAB, belly soft, ntnd, right leg with some purulence but no overt signs of infection.   Differential Diagnosis:   ACS, electrolyte derangment, sepsis, osteo, UTI, PNA, arrythmia  ED Management:  CBC, CMP, lactic, trop, BNP, ESR, CRP, UA  CXR, right knee xray, EKG  Dispo pending workup but anticipate admission            Attending Attestation:   Physician Attestation Statement for  Resident:  As the supervising MD   Physician Attestation Statement: I have personally seen and examined this patient.   I agree with the above history. -:   As the supervising MD I agree with the above PE.    As the supervising MD I agree with the above treatment, course, plan, and disposition.   -: 55 yo with syncope    Patient reports she was lying on the couch when she started feeling nauseous vomited and loss consciousness.  No seizure-like activity   reports similar episode when she had a heart attack before    Patient with recent right lower extremity BKA in December 2021. Denies any fever denies any chest pain, shortness of breath, abdominal pain, difficulty with urination, diarrhea, cough recently    Patient currently comfortable in the emergency department    DDX:  Arrhythmia versus ACS versus infectious etiology.  Based on the physical exam possible infection of the right stump  Patient with ROSLYN  Also elevated D-dimer, unable to obtain CTA secondary to ROSLYN, will obtain lower extremity DVT  Admitted to Hospital Medicine for further management                  ED Course as of 03/12/22 0404   Fri Mar 11, 2022   2350 CRP(!): 24.3 [BH]   2350 Sed Rate(!): 95 [BH]   2357 Troponin I: 0.006 [BH]   Sat Mar 12, 2022   0148 Inflammatory markers elevated. Ddimer elevated. Patient started on vanc and zosyn. Cr elevated. 1 L bolus. Will obtain DVT U/S. Will hold off on CTA PE right now with Cr. Patient will be admitted. Consider ventilation/perfusion scan inpatient.  [BH]   0150 D-Dimer(!): 0.62 [BH]   0150 Creatinine(!): 2.1 [BH]   0151 BUN(!): 61 [BH]      ED Course User Index  [BH] Deann Teixeira MD             Clinical Impression:   Final diagnoses:  [R07.9] Chest pain  [M86.9] Osteomyelitis  [R55] Syncope          ED Disposition Condition    Observation               Deann Teixeira MD  Resident  03/12/22 [id number]       Suzie Duckworth MD  03/12/22 [id number]

## 2022-03-12 NOTE — SUBJECTIVE & OBJECTIVE
Past Medical History:   Diagnosis Date    Anxiety     Chronic pain syndrome     CKD (chronic kidney disease), stage III     Depression     Diabetes mellitus     type 2    Diabetes mellitus, type 2     GERD (gastroesophageal reflux disease)     Hyperemesis 3/23/2021    Hyperlipemia     Hypertension     Hypokalemia 3/23/2021    Myocardial infarction 2010    minor-caused by stress per pt.    Osteomyelitis     Osteomyelitis of left foot 4/30/2021    PVD (peripheral vascular disease)     Vaginal delivery     x1       Past Surgical History:   Procedure Laterality Date    ABOVE-KNEE AMPUTATION Left 5/18/2021    Procedure: AMPUTATION, ABOVE KNEE;  Surgeon: Teddy Huber MD;  Location: [facility] OR [address] [place] [address];  Service: Vascular;  Laterality: Left;    Angiogram - Right Extremity Right 7/9/15    angiogram-left leg  10/6/15    ANGIOGRAPHY OF LOWER EXTREMITY Left 4/29/2021    Procedure: ANGIOGRAM, LOWER EXTREMITY;  Surgeon: Teddy Huber MD;  Location: [facility] OR [address] [place] [address];  Service: Vascular;  Laterality: Left;    BELOW KNEE AMPUTATION OF LOWER EXTREMITY Right 12/28/2021    Procedure: AMPUTATION, BELOW KNEE;  Surgeon: Kaitlyn Rojas MD;  Location: [facility];  Service: General;  Laterality: Right;    CATHETERIZATION OF BOTH LEFT AND RIGHT HEART N/A 12/18/2019    Procedure: CATHETERIZATION, HEART, BOTH LEFT AND RIGHT;  Surgeon: Que Fernando III, MD;  Location: [facility] CATH LAB;  Service: Cardiology;  Laterality: N/A;    CORONARY ANGIOGRAPHY N/A 12/18/2019    Procedure: ANGIOGRAM, CORONARY ARTERY;  Surgeon: Que Fernando III, MD;  Location: [facility] CATH LAB;  Service: Cardiology;  Laterality: N/A;    CORONARY ANGIOGRAPHY INCLUDING BYPASS GRAFTS WITH CATHETERIZATION OF LEFT HEART N/A 7/28/2020    Procedure: ANGIOGRAM, CORONARY, INCLUDING BYPASS GRAFT, WITH LEFT HEART CATHETERIZATION, 9 am;  Surgeon: Rachel Easley MD;  Location: [facility] CATH LAB;  Service: Cardiology;  Laterality: N/A;    CORONARY ARTERY BYPASS GRAFT  (CABG) N/A 1/14/2020    Procedure: CORONARY ARTERY BYPASS GRAFT (CABG) x 1     Off Pump;  Surgeon: Huang Altamirano MD;  Location: [facility] OR [address] [place] [address];  Service: Cardiovascular;  Laterality: N/A;    CREATION OF FEMORAL-TIBIAL ARTERY BYPASS Left 4/29/2021    Procedure: CREATION, BYPASS, ARTERIAL, FEMORAL TO ANTERIOR TIBIAL;  Surgeon: Teddy Huber MD;  [facility];  Service: Vascular;  Laterality: Left;    CREATION OF FEMOROPOPLITEAL ARTERIAL BYPASS USING GRAFT Left 8/18/2020    Procedure: CREATION, BYPASS, ARTERIAL, FEMORAL TO POPLITEAL, USING GRAFT, LEFT LOWER EXTREMITY;  Surgeon: Teddy Huber MD;  Location: [facility] OR;  Service: Vascular;  Laterality: Left;  REQUEST 7:15 A.M. START----COVID NEGATIVE ON 8/17 1ST CASE [facility] PER LEANA ON 8/7/2020 @ 942AM-  RN PREOP 8/12/2020   T/S-----CLEARED BY CARDS-------PENDING INSURANCE    DEBRIDEMENT OF FOOT Left 9/8/2020    Procedure: DEBRIDEMENT, FOOT;  Surgeon: Rosio Mayes DPM;  Location: [facility] OR;  Service: Podiatry;  Laterality: Left;  request neoxx .   RN Pre Op 9-4-2020, Covid negative on 9/5/20. C A    DEBRIDEMENT OF FOOT  3/4/2021    Procedure: DEBRIDEMENT, FOOT;  Surgeon: Teddy Huber MD;  Location: [facility] OR;  Service: Vascular;;    DEBRIDEMENT OF FOOT Left 3/9/2021    Procedure: DEBRIDEMENT, FOOT, bone biopsy;  Surgeon: Rosio Mayes DPM;  Location: [facility] OR;  Service: Podiatry;  Laterality: Left;  Request neoxx---COVID IN AM  REQUESTING NOON START  RN Phone Pre op.On Blood thinners Plavix and Eliquis.  Covid am of surgery. C A    DEBRIDEMENT OF FOOT Left 5/4/2021    Procedure: DEBRIDEMENT, FOOT;  Surgeon: Farooq Morley DPM;  [facility];  Service: Podiatry;  Laterality: Left;    INSERTION OF TUNNELED CENTRAL VENOUS HEMODIALYSIS CATHETER N/A 1/27/2020    Procedure: Insertion, Catheter, Central Venous, Hemodialysis;  Surgeon: ESTEBAN Gomez III, MD;  Location: [facility] CATH LAB;  Service: Peripheral Vascular;   Laterality: N/A;    PERCUTANEOUS TRANSLUMINAL ANGIOPLASTY N/A 3/4/2021    Procedure: PTA (ANGIOPLASTY, PERCUTANEOUS, TRANSLUMINAL);  Surgeon: Teddy Huber MD;  Location: [facility] OR;  Service: Vascular;  Laterality: N/A;    REMOVAL OF ARTERIOVENOUS GRAFT Left 5/27/2021    Procedure: REMOVAL, GRAFT, LEFT LOWER EXTREMITY, WOUND EXPLORATION;  Surgeon: Teddy Huber MD;  Location: [facility] OR 2ND FLR;  Service: Vascular;  Laterality: Left;    REMOVAL OF NAIL OF DIGIT Left 3/9/2021    Procedure: REMOVAL, NAIL, DIGIT;  Surgeon: Rosio Mayes DPM;  Location: [facility] OR;  Service: Podiatry;  Laterality: Left;    THROMBECTOMY Left 3/4/2021    Procedure: THROMBECTOMY, LEFT LOWER EXTREMITY BYPASS GRAFT, ANGIOGRAM, POSSIBLE INTERVENTION, POSSIBLE LEFT LOWER EXTREMITY BYPASS;  Surgeon: Teddy Huber MD;  Location: [facility] OR;  Service: Vascular;  Laterality: Left;    THROMBECTOMY Left 4/29/2021    Procedure: GRAFT THROMBECTOMY, LEFT LOWER EXTREMITY;  Surgeon: Teddy Huber MD;  Location: [facility] OR 2ND FLR;  Service: Vascular;  Laterality: Left;  14.5 min  1179.85 mGy  341.01 Gycm2  240 ml dye    THROMBECTOMY  10/22/2021    Procedure: THROMBECTOMY;  Surgeon: Saad Arenas MD;  Location: [facility] CATH LAB/EP;  Service: Cardiology;;       Review of patient's allergies indicates:   Allergen Reactions    Ciprofloxacin Itching    Contrast media      Kidney injury    Iodine      Kidney injury       Medications:  Medications Prior to Admission   Medication Sig    acetaminophen (TYLENOL) 500 MG tablet Take 2 tablets (1,000 mg total) by mouth every 8 (eight) hours as needed for Pain.    allopurinoL (ZYLOPRIM) 100 MG tablet Take 1 tablet (100 mg total) by mouth once daily.    atorvastatin (LIPITOR) 80 MG tablet Take 1 tablet (80 mg total) by mouth every evening.    blood-glucose meter,continuous (DEXCOM G6 ) Misc Use to check blood sugars 4 times/day    blood-glucose sensor (DEXCOM G6 SENSOR) Radha Apply one sensor to  skin every 10 days    blood-glucose transmitter (DEXCOM G6 TRANSMITTER) Rdaha Replace transmitter every 3 months to use with dexcom sensor    carvediloL (COREG) 3.125 MG tablet Take 1 tablet (3.125 mg total) by mouth 2 (two) times daily.    clopidogreL (PLAVIX) 75 mg tablet Take 1 tablet (75 mg total) by mouth once daily.    furosemide (LASIX) 40 MG tablet Take 1 tablet (40 mg total) by mouth once daily. Please hold until follow up with PCP    HYDROcodone-acetaminophen (NORCO) 5-325 mg per tablet Take 1 tablet by mouth every 4 (four) hours as needed for Pain.    insulin aspart U-100 (NOVOLOG FLEXPEN U-100 INSULIN) 100 unit/mL (3 mL) InPn pen Inject 5 Units into the skin 3 (three) times daily with meals. If not eating, ok to hold    insulin detemir U-100 (LEVEMIR FLEXTOUCH) 100 unit/mL (3 mL) SubQ InPn pen Inject 12 Units into the skin every evening.    magnesium oxide (MAG-OX) 400 mg (241.3 mg magnesium) tablet Take 1 tablet (400 mg total) by mouth 2 (two) times daily.    melatonin (MELATIN) 3 mg tablet Take 2 tablets (6 mg total) by mouth nightly as needed for Insomnia.    mirtazapine (REMERON) 7.5 MG Tab Take 2 tablets (15 mg total) by mouth nightly.    multivit/folic acid/vit K1 (ONE-A-DAY WOMEN'S 50 PLUS ORAL) Take 1 tablet by mouth Daily.    ondansetron (ZOFRAN-ODT) 8 MG TbDL Dissolve 1 tablet (8 mg total) by mouth every 8 (eight) hours as needed.    oxyCODONE-acetaminophen (PERCOCET)  mg per tablet Take 1 tablet by mouth every 6 (six) hours as needed.    rivaroxaban (XARELTO) 2.5 mg Tab Take 1 tablet (2.5 mg total) by mouth 2 (two) times daily with meals.    sertraline (ZOLOFT) 50 MG tablet Take 1 tablet (50 mg total) by mouth once daily.    sodium bicarbonate 650 MG tablet Take 1 tablet (650 mg total) by mouth 2 (two) times daily. Please hold until follow up with Primary Care Provider     Antibiotics (From admission, onward)                None          Antifungals (From admission, onward)                 None          Antivirals (From admission, onward)      None             Immunization History   Administered Date(s) Administered    COVID-19, MRNA, LN-S, PF (Pfizer) (Purple Cap) 03/09/2021, 03/30/2021       Family History       Problem Relation (Age of Onset)    Diabetes Mother, Father, Paternal Grandmother    Heart disease Maternal Grandmother    No Known Problems Maternal Grandfather, Paternal Grandfather          Social History     Socioeconomic History    Marital status:    Occupational History    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      Social Determinants of Health     Financial Resource Strain: High Risk    Difficulty of Paying Living Expenses: Hard   Food Insecurity: No Food Insecurity    Worried About Running Out of Food in the Last Year: Never true    Ran Out of Food in the Last Year: Never true   Transportation Needs: No Transportation Needs    Lack of Transportation (Medical): No    Lack of Transportation (Non-Medical): No   Stress: Stress Concern Present    Feeling of Stress : To some extent   Social Connections: Unknown    Frequency of Communication with Friends and Family: More than three times a week    Frequency of Social Gatherings with Friends and Family: Three times a week    Marital Status:    Housing Stability: Low Risk     Unable to Pay for Housing in the Last Year: No    Number of Places Lived in the Last Year: 1    Unstable Housing in the Last Year: No     Review of Systems   Constitutional:  Positive for activity change. Negative for appetite change, chills and fever.   HENT:  Negative for congestion.    Eyes:  Negative for pain and itching.   Respiratory:  Negative for cough, chest tightness and  shortness of breath.    Cardiovascular:  Negative for palpitations and leg swelling.   Gastrointestinal:  Positive for nausea. Negative for abdominal pain.   Endocrine: Negative for polyphagia and polyuria.   Genitourinary:  Negative for dysuria and frequency.   Musculoskeletal:  Negative for back pain.   Skin:  Positive for wound.   Neurological:  Positive for syncope. Negative for numbness and headaches.   Psychiatric/Behavioral:  Negative for agitation and behavioral problems.    Objective:     Vital Signs (Most Recent):  Temp: 96.6 °F (35.9 °C) (03/12/22 1040)  Pulse: 78 (03/12/22 1040)  Resp: 15 (03/12/22 1040)  BP: (!) 148/72 (03/12/22 1040)  SpO2: 99 % (03/12/22 1040)   Vital Signs (24h Range):  Temp:  [96.6 °F (35.9 °C)-98.1 °F (36.7 °C)] 96.6 °F (35.9 °C)  Pulse:  [70-96] 78  Resp:  [13-20] 15  SpO2:  [99 %-100 %] 99 %  BP: (123-169)/(55-81) 148/72     Weight: 65.3 kg (144 lb)  Body mass index is 23.96 kg/m².    Estimated Creatinine Clearance: 27.2 mL/min (A) (based on SCr of 2.1 mg/dL (H)).    Physical Exam  Constitutional:       Appearance: Normal appearance.   HENT:      Head: Normocephalic and atraumatic.   Cardiovascular:      Rate and Rhythm: Normal rate.   Pulmonary:      Effort: Pulmonary effort is normal.   Abdominal:      General: Abdomen is flat.      Palpations: Abdomen is soft.   Musculoskeletal:      Comments: Left AKA- Right BKA with open wound    Skin:     General: Skin is warm and dry.   Neurological:      Mental Status: She is alert and oriented to person, place, and time.   Psychiatric:         Behavior: Behavior normal.         Significant Labs: All pertinent labs within the past 24 hours have been reviewed.    Significant Imaging: I have reviewed all pertinent imaging results/findings within the past 24 hours.

## 2022-03-12 NOTE — ASSESSMENT & PLAN NOTE
Acute infection  Will continue broad spectrum abx currently with MDRO in cultures previously  Wound care consult  Surgical consult

## 2022-03-12 NOTE — PROGRESS NOTES
VANCOMYCIN DOSING BY PHARMACY DISCONTINUATION NOTE    Suyapa Connelly is a 56 y.o. female who had been consulted for vancomycin dosing.    The pharmacy consult for vancomycin dosing has been discontinued.     Vancomycin Dosing by Pharmacy Consult will sign-off. Please reconsult if necessary. Thank you for allowing us to participate in this patient's care.       Jessika Patel, PharmD  [phone]

## 2022-03-12 NOTE — CONSULTS
Consulted for a patient with nonhealing BKA which was performed at [facility]. Since there is no emergent indication for surgery we recommend that the patient follow up with the vascular surgeon who performed the BKA.

## 2022-03-13 LAB
ALBUMIN SERPL BCP-MCNC: 2.1 G/DL (ref 3.5–5.2)
ALP SERPL-CCNC: 91 U/L (ref 55–135)
ALT SERPL W/O P-5'-P-CCNC: 7 U/L (ref 10–44)
ANION GAP SERPL CALC-SCNC: 11 MMOL/L (ref 8–16)
AST SERPL-CCNC: 9 U/L (ref 10–40)
BASOPHILS # BLD AUTO: 0.05 K/UL (ref 0–0.2)
BASOPHILS NFR BLD: 0.7 % (ref 0–1.9)
BILIRUB SERPL-MCNC: 0.2 MG/DL (ref 0.1–1)
BUN SERPL-MCNC: 49 MG/DL (ref 6–20)
CALCIUM SERPL-MCNC: 9 MG/DL (ref 8.7–10.5)
CHLORIDE SERPL-SCNC: 109 MMOL/L (ref 95–110)
CO2 SERPL-SCNC: 20 MMOL/L (ref 23–29)
CREAT SERPL-MCNC: 1.8 MG/DL (ref 0.5–1.4)
DIFFERENTIAL METHOD: ABNORMAL
EOSINOPHIL # BLD AUTO: 0.3 K/UL (ref 0–0.5)
EOSINOPHIL NFR BLD: 5.1 % (ref 0–8)
ERYTHROCYTE [DISTWIDTH] IN BLOOD BY AUTOMATED COUNT: 17.1 % (ref 11.5–14.5)
EST. GFR  (AFRICAN AMERICAN): 36 ML/MIN/1.73 M^2
EST. GFR  (NON AFRICAN AMERICAN): 31.2 ML/MIN/1.73 M^2
GLUCOSE SERPL-MCNC: 176 MG/DL (ref 70–110)
HCT VFR BLD AUTO: 31.3 % (ref 37–48.5)
HGB BLD-MCNC: 10.2 G/DL (ref 12–16)
IMM GRANULOCYTES # BLD AUTO: 0.02 K/UL (ref 0–0.04)
IMM GRANULOCYTES NFR BLD AUTO: 0.3 % (ref 0–0.5)
LYMPHOCYTES # BLD AUTO: 1.6 K/UL (ref 1–4.8)
LYMPHOCYTES NFR BLD: 24.1 % (ref 18–48)
MAGNESIUM SERPL-MCNC: 2 MG/DL (ref 1.6–2.6)
MCH RBC QN AUTO: 29.9 PG (ref 27–31)
MCHC RBC AUTO-ENTMCNC: 32.6 G/DL (ref 32–36)
MCV RBC AUTO: 92 FL (ref 82–98)
MONOCYTES # BLD AUTO: 0.7 K/UL (ref 0.3–1)
MONOCYTES NFR BLD: 11.1 % (ref 4–15)
NEUTROPHILS # BLD AUTO: 3.9 K/UL (ref 1.8–7.7)
NEUTROPHILS NFR BLD: 58.7 % (ref 38–73)
NRBC BLD-RTO: 0 /100 WBC
PHOSPHATE SERPL-MCNC: 3.9 MG/DL (ref 2.7–4.5)
PLATELET # BLD AUTO: 366 K/UL (ref 150–450)
PMV BLD AUTO: 10.9 FL (ref 9.2–12.9)
POCT GLUCOSE: 140 MG/DL (ref 70–110)
POCT GLUCOSE: 190 MG/DL (ref 70–110)
POCT GLUCOSE: 211 MG/DL (ref 70–110)
POCT GLUCOSE: 239 MG/DL (ref 70–110)
POTASSIUM SERPL-SCNC: 4.6 MMOL/L (ref 3.5–5.1)
PROT SERPL-MCNC: 7.1 G/DL (ref 6–8.4)
RBC # BLD AUTO: 3.41 M/UL (ref 4–5.4)
SODIUM SERPL-SCNC: 140 MMOL/L (ref 136–145)
WBC # BLD AUTO: 6.69 K/UL (ref 3.9–12.7)

## 2022-03-13 PROCEDURE — 83735 ASSAY OF MAGNESIUM: CPT | Performed by: STUDENT IN AN ORGANIZED HEALTH CARE EDUCATION/TRAINING PROGRAM

## 2022-03-13 PROCEDURE — G0378 HOSPITAL OBSERVATION PER HR: HCPCS

## 2022-03-13 PROCEDURE — C9399 UNCLASSIFIED DRUGS OR BIOLOG: HCPCS | Performed by: INTERNAL MEDICINE

## 2022-03-13 PROCEDURE — 94761 N-INVAS EAR/PLS OXIMETRY MLT: CPT

## 2022-03-13 PROCEDURE — 63600175 PHARM REV CODE 636 W HCPCS: Performed by: INTERNAL MEDICINE

## 2022-03-13 PROCEDURE — 99226 PR SUBSEQUENT OBSERVATION CARE,LEVEL III: ICD-10-PCS | Mod: CS,,, | Performed by: STUDENT IN AN ORGANIZED HEALTH CARE EDUCATION/TRAINING PROGRAM

## 2022-03-13 PROCEDURE — 36415 COLL VENOUS BLD VENIPUNCTURE: CPT | Performed by: STUDENT IN AN ORGANIZED HEALTH CARE EDUCATION/TRAINING PROGRAM

## 2022-03-13 PROCEDURE — 84100 ASSAY OF PHOSPHORUS: CPT | Performed by: STUDENT IN AN ORGANIZED HEALTH CARE EDUCATION/TRAINING PROGRAM

## 2022-03-13 PROCEDURE — 25000003 PHARM REV CODE 250: Performed by: INTERNAL MEDICINE

## 2022-03-13 PROCEDURE — 80053 COMPREHEN METABOLIC PANEL: CPT | Performed by: STUDENT IN AN ORGANIZED HEALTH CARE EDUCATION/TRAINING PROGRAM

## 2022-03-13 PROCEDURE — 25000003 PHARM REV CODE 250: Performed by: STUDENT IN AN ORGANIZED HEALTH CARE EDUCATION/TRAINING PROGRAM

## 2022-03-13 PROCEDURE — 87040 BLOOD CULTURE FOR BACTERIA: CPT | Performed by: STUDENT IN AN ORGANIZED HEALTH CARE EDUCATION/TRAINING PROGRAM

## 2022-03-13 PROCEDURE — 85025 COMPLETE CBC W/AUTO DIFF WBC: CPT | Performed by: STUDENT IN AN ORGANIZED HEALTH CARE EDUCATION/TRAINING PROGRAM

## 2022-03-13 PROCEDURE — 96372 THER/PROPH/DIAG INJ SC/IM: CPT | Performed by: INTERNAL MEDICINE

## 2022-03-13 PROCEDURE — 99226 PR SUBSEQUENT OBSERVATION CARE,LEVEL III: CPT | Mod: CS,,, | Performed by: STUDENT IN AN ORGANIZED HEALTH CARE EDUCATION/TRAINING PROGRAM

## 2022-03-13 RX ORDER — NIFEDIPINE 30 MG/1
30 TABLET, EXTENDED RELEASE ORAL DAILY
Status: DISCONTINUED | OUTPATIENT
Start: 2022-03-13 | End: 2022-03-16 | Stop reason: HOSPADM

## 2022-03-13 RX ADMIN — SODIUM BICARBONATE 650 MG TABLET 650 MG: at 10:03

## 2022-03-13 RX ADMIN — RIVAROXABAN 2.5 MG: 2.5 TABLET, FILM COATED ORAL at 04:03

## 2022-03-13 RX ADMIN — CLOPIDOGREL 75 MG: 75 TABLET, FILM COATED ORAL at 10:03

## 2022-03-13 RX ADMIN — NIFEDIPINE 30 MG: 30 TABLET, FILM COATED, EXTENDED RELEASE ORAL at 02:03

## 2022-03-13 RX ADMIN — MIRTAZAPINE 15 MG: 15 TABLET, FILM COATED ORAL at 10:03

## 2022-03-13 RX ADMIN — ATORVASTATIN CALCIUM 80 MG: 40 TABLET, FILM COATED ORAL at 10:03

## 2022-03-13 RX ADMIN — ALLOPURINOL 100 MG: 100 TABLET ORAL at 10:03

## 2022-03-13 RX ADMIN — INSULIN ASPART 4 UNITS: 100 INJECTION, SOLUTION INTRAVENOUS; SUBCUTANEOUS at 11:03

## 2022-03-13 RX ADMIN — FUROSEMIDE 40 MG: 40 TABLET ORAL at 10:03

## 2022-03-13 RX ADMIN — INSULIN ASPART 2 UNITS: 100 INJECTION, SOLUTION INTRAVENOUS; SUBCUTANEOUS at 04:03

## 2022-03-13 RX ADMIN — HYDROCODONE BITARTRATE AND ACETAMINOPHEN 1 TABLET: 5; 325 TABLET ORAL at 07:03

## 2022-03-13 RX ADMIN — INSULIN DETEMIR 9 UNITS: 100 INJECTION, SOLUTION SUBCUTANEOUS at 10:03

## 2022-03-13 RX ADMIN — CARVEDILOL 3.12 MG: 3.12 TABLET, FILM COATED ORAL at 10:03

## 2022-03-13 RX ADMIN — RIVAROXABAN 2.5 MG: 2.5 TABLET, FILM COATED ORAL at 07:03

## 2022-03-13 RX ADMIN — INSULIN ASPART 4 UNITS: 100 INJECTION, SOLUTION INTRAVENOUS; SUBCUTANEOUS at 07:03

## 2022-03-13 RX ADMIN — INSULIN ASPART 2 UNITS: 100 INJECTION, SOLUTION INTRAVENOUS; SUBCUTANEOUS at 11:03

## 2022-03-13 RX ADMIN — SERTRALINE HYDROCHLORIDE 50 MG: 50 TABLET ORAL at 10:03

## 2022-03-13 RX ADMIN — INSULIN ASPART 4 UNITS: 100 INJECTION, SOLUTION INTRAVENOUS; SUBCUTANEOUS at 04:03

## 2022-03-13 NOTE — PLAN OF CARE
Chart checked, pt off the floor during rounds -    No fevers documented overnight. MRI pending. Ucx with GNR, though pt asymptomatic per discussion yesterday and today at bedside with . Blcx with GPC 1:4 - possible contaminant.       Recommendations:  -follow up MRI - if evidence of OM or fluid collection, recommend drainage/bone biopsy with cultures/path  -suspect GPC in blood to be a contaminant as pt remains afebrile, without leukocytosis and HDS   -recommend 2 sets of blcx - ordered  -suspect GNR in urine to be colonizer given that pt is asymptomatic    Above d/w primary team.       Genny Kenyon MD  Infectious Disease  [facility] - Surgery

## 2022-03-13 NOTE — SUBJECTIVE & OBJECTIVE
Interval History: No fevers documented overnight. MRI pending. Blcx with 1:4 GPC and urine cx with GNR  <49k CFU.       Review of Systems  Objective:     Vital Signs (Most Recent):  Temp: 97.2 °F (36.2 °C) (03/13/22 0733)  Pulse: 91 (03/13/22 0733)  Resp: 16 (03/13/22 0713)  BP: (!) 183/81 (03/13/22 0713)  SpO2: 98 % (03/13/22 0733)   Vital Signs (24h Range):  Temp:  [96.6 °F (35.9 °C)-97.2 °F (36.2 °C)] 97.2 °F (36.2 °C)  Pulse:  [77-97] 91  Resp:  [15-18] 16  SpO2:  [91 %-100 %] 98 %  BP: (100-199)/(54-86) 183/81     Weight: 65.3 kg (144 lb)  Body mass index is 23.96 kg/m².    Estimated Creatinine Clearance: 31.8 mL/min (A) (based on SCr of 1.8 mg/dL (H)).    Physical Exam    Significant Labs:   Microbiology Results (last 7 days)       Procedure Component Value Units Date/Time    Urine culture [[id number]]  (Abnormal) Collected: 03/12/22 0835    Order Status: Completed Specimen: Urine Updated: 03/13/22 0732     Urine Culture, Routine GRAM NEGATIVE YSABEL  10,000 - 49,999 cfu/ml  Identification and susceptibility pending      Narrative:      Specimen Source->Urine    Blood culture #1 **CANNOT BE ORDERED STAT** [[id number]] Collected: 03/12/22 0050    Order Status: Completed Specimen: Blood from Peripheral, Antecubital, Left Updated: 03/13/22 0613     Blood Culture, Routine No Growth to date      No Growth to date    Blood culture #2 **CANNOT BE ORDERED STAT** [[id number]] Collected: 03/12/22 0050    Order Status: Completed Specimen: Blood from Peripheral, Antecubital, Right Updated: 03/13/22 0323     Blood Culture, Routine Gram stain salvatore bottle: Gram positive cocci in clusters resembling Staph       Results called to and read back by:Monet Sanchez RN 03/13/2022  03:22            Significant Imaging: I have reviewed all pertinent imaging results/findings within the past 24 hours.

## 2022-03-13 NOTE — HOSPITAL COURSE
Pt admitted to  team G. ID consulted, recommended discontinuing abx given no leukocytosis and that she remained afebrile. Vascular surgery consulted and recommended that pt follow-up with her vascular team at [facility]. MRI RLE obtained, which showed mild increased abnormal medullary signal within the distal 1 cm of the tibial stump with associated cortical indistinctness, findings that are concerning for early osteomyelitis; stable medullary edema within the distal 1 cm of the fibular stump with grossly preserved cortical margins, possibly reactive; persistent soft tissue edema about the tibial and fibular stumps with ill-defined fluid, possibly sterile or infectious in nature (no focal drainable fluid collection); prominent nonspecific myositis throughout the visualized anterior compartment of the thigh, sartorius and to a lesser extent the gracilis; abnormal signal intensity within the visualized superficial femoral and popliteal arteries presumably related to known thrombosis.    UCx with GNRs and Bcx with GPCs in 1/4 bottles: ID aware, still recommending to hold abx while cultures result. Pt and  reported that they do not wish to continue following with vascular at [facility]. Discussed with [facility] vascular fellow (Aundrea) who stated he would schedule and outpatient appointment with Dr. Florez in the [facility] vascular clinic.    BCx great coag-negative staph, deemed likely contaminant. ID recommended proceeding with inpatient bone biopsy. IR felt that surgical services might be more appropriate, and vascular still deferring for outpatient management. ID to consider 6 week of IV abx course. On 3/15, vascular made decision to proceed with right AKA on 3/18.    Pt was cleared to return as an outpatient for AKA, and ID stated the pt did not need IV abx due to HDS and absence of current fever or leukocytosis. Pt deemed appropriate for discharge. Plan discussed with pt, who was agreeable and amenable; medications were  discussed and reviewed, outpatient follow-up scheduled, ER precautions were given, all questions were answered to the pt's satisfaction, and Mrs. Connelly was subsequently discharged.

## 2022-03-13 NOTE — SUBJECTIVE & OBJECTIVE
Interval History:  Pt seen and examined this morning on rounds. PEE. Remains grossly asymptomatic. Updated on lab findings and discussions with ID and vascular. Care plan reviewed. Otherwise, doing well and with no further complaints at this time.      Objective:     Vital Signs (Most Recent):  Temp: 97.9 °F (36.6 °C) (03/13/22 1144)  Pulse: 93 (03/13/22 1144)  Resp: 18 (03/13/22 1144)  BP: (!) 178/79 (03/13/22 1144)  SpO2: 100 % (03/13/22 1144)   Vital Signs (24h Range):  Temp:  [96.7 °F (35.9 °C)-97.9 °F (36.6 °C)] 97.9 °F (36.6 °C)  Pulse:  [77-97] 93  Resp:  [15-18] 18  SpO2:  [91 %-100 %] 100 %  BP: (100-199)/(54-86) 178/79     Weight: 65.3 kg (144 lb)  Body mass index is 23.96 kg/m².    Intake/Output Summary (Last 24 hours) at 3/13/2022 1401  Last data filed at 3/13/2022 1056  Gross per 24 hour   Intake 692.71 ml   Output 2350 ml   Net -1657.29 ml        Physical Exam  Gen: in NAD, appears stated age  Neuro: AAOx4, CN2-12 grossly intact BL; motor, sensory, and strength grossly intact BL  HEENT: NTNC, EOMI, PERRLA, MMM; no thyromegaly or lymphadenopathy; no JVD appreciated  CVS: RRR, no m/r/g; S1/S2 auscultated with no S3 or S4; capillary refill < 2 sec  Resp: lungs CTAB, no w/r/r; no belabored breathing or accessory muscle use appreciated   Abd: BS+ in all 4 quadrants; NTND, soft to palpation; no organomegaly appreciated   Extrem: pulses full, equal, and regular over all 4 extremities; s/p right BKA and s/p right AKA; see image of RLE below        Significant Labs: All pertinent labs within the past 24 hours have been reviewed.    Significant Imaging: I have reviewed all pertinent imaging results/findings within the past 24 hours.

## 2022-03-13 NOTE — PROGRESS NOTES
[facility] Medicine  Progress Note    Patient Name: Suyapa Connelly  MRN: [id number]  Patient Class: OP- Observation   Admission Date: 3/11/2022  Length of Stay: 0 days  Attending Physician: Hitesh Quijano MD  Primary Care Provider: Magen Christensen MD        Subjective:     Principal Problem:Infection of amputation stump of right lower extremity        HPI:  55 yo female with CAD s/p CABG, CKD3, DM2, osteomyelitis with previous left AKA and newer Right BKA (12/2021), and recent hospitalization for sepsis and wound infection presenting for nausea, syncope, vomitting. Symptoms resolved about 15 seconds and returned to baseline afterwards. She ultimately denies any current symptom; however she does report that she has been having trouble following up about her BKA and cleaning it at home. There is some purulent drainage from the site of her BKA however.         Overview/Hospital Course:  Pt admitted to  team DAYNE QUINONES consulted, recommended discontinuing abx given no leukocytosis and that she remained afebrile. Vascular surgery consulted and recommended that pt follow-up with her vascular team at [place]. MRI RLE obtained, which showed mild increased abnormal medullary signal within the distal 1 cm of the tibial stump with associated cortical indistinctness, findings that are concerning for early osteomyelitis; stable medullary edema within the distal 1 cm of the fibular stump with grossly preserved cortical margins, possibly reactive; persistent soft tissue edema about the tibial and fibular stumps with ill-defined fluid, possibly sterile or infectious in nature (no focal drainable fluid collection); prominent nonspecific myositis throughout the visualized anterior compartment of the thigh, sartorius and to a lesser extent the gracilis; abnormal signal intensity within the visualized superficial femoral and popliteal arteries presumably related to known thrombosis.    UCx with GNRs and Bcx with GPCs in  1/4 bottles: ID aware, still recommending to hold abx while cultures result. Pt and  reported that they do not wish to continue following with vascular at [place]. Discussed with [facility] vascular fellow (Aundrea) who stated he would schedule and outpatient appointment with Dr. Florez in the [facility] vascular clinic.        Interval History:  Pt seen and examined this morning on rounds. PEE. Remains grossly asymptomatic. Updated on lab findings and discussions with ID and vascular. Care plan reviewed. Otherwise, doing well and with no further complaints at this time.      Objective:     Vital Signs (Most Recent):  Temp: 97.9 °F (36.6 °C) (03/13/22 1144)  Pulse: 93 (03/13/22 1144)  Resp: 18 (03/13/22 1144)  BP: (!) 178/79 (03/13/22 1144)  SpO2: 100 % (03/13/22 1144)   Vital Signs (24h Range):  Temp:  [96.7 °F (35.9 °C)-97.9 °F (36.6 °C)] 97.9 °F (36.6 °C)  Pulse:  [77-97] 93  Resp:  [15-18] 18  SpO2:  [91 %-100 %] 100 %  BP: (100-199)/(54-86) 178/79     Weight: 65.3 kg (144 lb)  Body mass index is 23.96 kg/m².    Intake/Output Summary (Last 24 hours) at 3/13/2022 1401  Last data filed at 3/13/2022 1056  Gross per 24 hour   Intake 692.71 ml   Output 2350 ml   Net -1657.29 ml        Physical Exam  Gen: in NAD, appears stated age  Neuro: AAOx4, CN2-12 grossly intact BL; motor, sensory, and strength grossly intact BL  HEENT: NTNC, EOMI, PERRLA, MMM; no thyromegaly or lymphadenopathy; no JVD appreciated  CVS: RRR, no m/r/g; S1/S2 auscultated with no S3 or S4; capillary refill < 2 sec  Resp: lungs CTAB, no w/r/r; no belabored breathing or accessory muscle use appreciated   Abd: BS+ in all 4 quadrants; NTND, soft to palpation; no organomegaly appreciated   Extrem: pulses full, equal, and regular over all 4 extremities; s/p right BKA and s/p right AKA; see image of RLE below        Significant Labs: All pertinent labs within the past 24 hours have been reviewed.    Significant Imaging: I have reviewed all pertinent imaging  results/findings within the past 24 hours.      Assessment/Plan:      * Infection of amputation stump of right lower extremity  - Interval history and physical exam findings as described above  - MRI findings as documented  - Afebrile, no leukocytosis  - ID recommended holding abx at this time  - Awaiting culture results  - Vascular recommending outpatient management  - Will continue to montior      Class 2 severe obesity due to excess calories with serious comorbidity in adult  Morbid obesity complicates all aspects of disease management from diagnostic modalities to treatment. Weight loss encouraged and health benefits explained to patient.         Stage 3 chronic kidney disease  Likely elevated above baseline, will give small bolus of IVFs, follow creatinine      Paroxysmal atrial fibrillation  Patient with Paroxysmal (<7 days) atrial fibrillation which is controlled currently with home medications. Patient is currently in atrial fibrillation.[id number] Score: 3. Anticoagulation indicated. Anticoagulation done with xarelto.        Coronary atherosclerosis  Chronic, controlled, continue home meds    Type 2 diabetes mellitus with hyperglycemia, with long-term current use of insulin  Chronic, controlled, continue home insulin dosages at reduced dosages      Mixed hyperlipidemia  Chronic, controlled, continue statin      CAROLIN (generalized anxiety disorder)  Continue home medications  Chronic, controlled      Essential hypertension  Chronic, controlled, continue home meds      Peripheral vascular disease  Chronic, s/p bilateral amputations        VTE Risk Mitigation (From admission, onward)         Ordered     IP VTE HIGH RISK PATIENT  Once         03/12/22 0326     Place sequential compression device  Until discontinued         03/12/22 0326     Reason for No Pharmacological VTE Prophylaxis  Once        Question:  Reasons:  Answer:  Already adequately anticoagulated on oral Anticoagulants    03/12/22 0326                 Discharge Planning   DEVAN: 3/14/2022     Code Status: Full Code   Is the patient medically ready for discharge?: No    Reason for patient still in hospital (select all that apply): Patient trending condition             Hitesh Quijano MD  Attending Physician  Department of Hospital Medicine  Epic secure chat preferred, or SpectraLink ext. [phone]  3/13/2022

## 2022-03-13 NOTE — PLAN OF CARE
Pt is AAOx4. VSS. No falls/injury as pt calls for assistance when needed. Pt has a healing ulcer on her buttocks. Pt positions with assistance x 2. Pain being monitored and controled with PRN and scheduled meds. Pt went to MRI for imaging. MD recommended ID to reassess the pt.. MD suspects early signs of infection from MRI imaging. Bed in lowest position. Call light within reach. Will continue to monitor.

## 2022-03-13 NOTE — ASSESSMENT & PLAN NOTE
- Interval history and physical exam findings as described above  - MRI findings as documented  - Afebrile, no leukocytosis  - ID recommended holding abx at this time  - Awaiting culture results  - Vascular recommending outpatient management  - Will continue to montior

## 2022-03-14 LAB
ALBUMIN SERPL BCP-MCNC: 2 G/DL (ref 3.5–5.2)
ALP SERPL-CCNC: 99 U/L (ref 55–135)
ALT SERPL W/O P-5'-P-CCNC: 5 U/L (ref 10–44)
ANION GAP SERPL CALC-SCNC: 10 MMOL/L (ref 8–16)
AST SERPL-CCNC: 9 U/L (ref 10–40)
BACTERIA UR CULT: ABNORMAL
BASOPHILS # BLD AUTO: 0.04 K/UL (ref 0–0.2)
BASOPHILS NFR BLD: 0.6 % (ref 0–1.9)
BILIRUB SERPL-MCNC: 0.1 MG/DL (ref 0.1–1)
BUN SERPL-MCNC: 43 MG/DL (ref 6–20)
CALCIUM SERPL-MCNC: 8.9 MG/DL (ref 8.7–10.5)
CHLORIDE SERPL-SCNC: 104 MMOL/L (ref 95–110)
CO2 SERPL-SCNC: 20 MMOL/L (ref 23–29)
CREAT SERPL-MCNC: 1.9 MG/DL (ref 0.5–1.4)
DIFFERENTIAL METHOD: ABNORMAL
EOSINOPHIL # BLD AUTO: 0.3 K/UL (ref 0–0.5)
EOSINOPHIL NFR BLD: 4 % (ref 0–8)
ERYTHROCYTE [DISTWIDTH] IN BLOOD BY AUTOMATED COUNT: 16.8 % (ref 11.5–14.5)
EST. GFR  (AFRICAN AMERICAN): 33.7 ML/MIN/1.73 M^2
EST. GFR  (NON AFRICAN AMERICAN): 29.3 ML/MIN/1.73 M^2
GLUCOSE SERPL-MCNC: 345 MG/DL (ref 70–110)
HCT VFR BLD AUTO: 28.5 % (ref 37–48.5)
HGB BLD-MCNC: 9.2 G/DL (ref 12–16)
IMM GRANULOCYTES # BLD AUTO: 0.02 K/UL (ref 0–0.04)
IMM GRANULOCYTES NFR BLD AUTO: 0.3 % (ref 0–0.5)
LYMPHOCYTES # BLD AUTO: 1.8 K/UL (ref 1–4.8)
LYMPHOCYTES NFR BLD: 27 % (ref 18–48)
MAGNESIUM SERPL-MCNC: 1.8 MG/DL (ref 1.6–2.6)
MCH RBC QN AUTO: 31 PG (ref 27–31)
MCHC RBC AUTO-ENTMCNC: 32.3 G/DL (ref 32–36)
MCV RBC AUTO: 96 FL (ref 82–98)
MONOCYTES # BLD AUTO: 0.7 K/UL (ref 0.3–1)
MONOCYTES NFR BLD: 10 % (ref 4–15)
NEUTROPHILS # BLD AUTO: 3.9 K/UL (ref 1.8–7.7)
NEUTROPHILS NFR BLD: 58.1 % (ref 38–73)
NRBC BLD-RTO: 0 /100 WBC
PHOSPHATE SERPL-MCNC: 3.7 MG/DL (ref 2.7–4.5)
PLATELET # BLD AUTO: 392 K/UL (ref 150–450)
PMV BLD AUTO: 10.9 FL (ref 9.2–12.9)
POCT GLUCOSE: 142 MG/DL (ref 70–110)
POCT GLUCOSE: 148 MG/DL (ref 70–110)
POCT GLUCOSE: 164 MG/DL (ref 70–110)
POCT GLUCOSE: 344 MG/DL (ref 70–110)
POTASSIUM SERPL-SCNC: 4.6 MMOL/L (ref 3.5–5.1)
PROT SERPL-MCNC: 6.7 G/DL (ref 6–8.4)
RBC # BLD AUTO: 2.97 M/UL (ref 4–5.4)
SODIUM SERPL-SCNC: 134 MMOL/L (ref 136–145)
WBC # BLD AUTO: 6.78 K/UL (ref 3.9–12.7)

## 2022-03-14 PROCEDURE — 80053 COMPREHEN METABOLIC PANEL: CPT | Performed by: STUDENT IN AN ORGANIZED HEALTH CARE EDUCATION/TRAINING PROGRAM

## 2022-03-14 PROCEDURE — 25000003 PHARM REV CODE 250: Performed by: STUDENT IN AN ORGANIZED HEALTH CARE EDUCATION/TRAINING PROGRAM

## 2022-03-14 PROCEDURE — 25000003 PHARM REV CODE 250: Performed by: INTERNAL MEDICINE

## 2022-03-14 PROCEDURE — 99233 PR SUBSEQUENT HOSPITAL CARE,LEVL III: ICD-10-PCS | Mod: GC,,, | Performed by: INTERNAL MEDICINE

## 2022-03-14 PROCEDURE — 84100 ASSAY OF PHOSPHORUS: CPT | Performed by: STUDENT IN AN ORGANIZED HEALTH CARE EDUCATION/TRAINING PROGRAM

## 2022-03-14 PROCEDURE — 85025 COMPLETE CBC W/AUTO DIFF WBC: CPT | Performed by: STUDENT IN AN ORGANIZED HEALTH CARE EDUCATION/TRAINING PROGRAM

## 2022-03-14 PROCEDURE — 99233 PR SUBSEQUENT HOSPITAL CARE,LEVL III: ICD-10-PCS | Mod: ,,, | Performed by: STUDENT IN AN ORGANIZED HEALTH CARE EDUCATION/TRAINING PROGRAM

## 2022-03-14 PROCEDURE — 11000001 HC ACUTE MED/SURG PRIVATE ROOM

## 2022-03-14 PROCEDURE — 83735 ASSAY OF MAGNESIUM: CPT | Performed by: STUDENT IN AN ORGANIZED HEALTH CARE EDUCATION/TRAINING PROGRAM

## 2022-03-14 PROCEDURE — 99233 SBSQ HOSP IP/OBS HIGH 50: CPT | Mod: ,,, | Performed by: STUDENT IN AN ORGANIZED HEALTH CARE EDUCATION/TRAINING PROGRAM

## 2022-03-14 PROCEDURE — 63600175 PHARM REV CODE 636 W HCPCS: Performed by: STUDENT IN AN ORGANIZED HEALTH CARE EDUCATION/TRAINING PROGRAM

## 2022-03-14 PROCEDURE — 36415 COLL VENOUS BLD VENIPUNCTURE: CPT | Performed by: STUDENT IN AN ORGANIZED HEALTH CARE EDUCATION/TRAINING PROGRAM

## 2022-03-14 PROCEDURE — 99233 SBSQ HOSP IP/OBS HIGH 50: CPT | Mod: GC,,, | Performed by: INTERNAL MEDICINE

## 2022-03-14 RX ADMIN — ALLOPURINOL 100 MG: 100 TABLET ORAL at 08:03

## 2022-03-14 RX ADMIN — CARVEDILOL 3.12 MG: 3.12 TABLET, FILM COATED ORAL at 08:03

## 2022-03-14 RX ADMIN — INSULIN ASPART 4 UNITS: 100 INJECTION, SOLUTION INTRAVENOUS; SUBCUTANEOUS at 07:03

## 2022-03-14 RX ADMIN — INSULIN ASPART 4 UNITS: 100 INJECTION, SOLUTION INTRAVENOUS; SUBCUTANEOUS at 08:03

## 2022-03-14 RX ADMIN — INSULIN ASPART 4 UNITS: 100 INJECTION, SOLUTION INTRAVENOUS; SUBCUTANEOUS at 01:03

## 2022-03-14 RX ADMIN — INSULIN DETEMIR 9 UNITS: 100 INJECTION, SOLUTION SUBCUTANEOUS at 09:03

## 2022-03-14 RX ADMIN — INSULIN ASPART 4 UNITS: 100 INJECTION, SOLUTION INTRAVENOUS; SUBCUTANEOUS at 05:03

## 2022-03-14 RX ADMIN — FUROSEMIDE 40 MG: 40 TABLET ORAL at 08:03

## 2022-03-14 RX ADMIN — ATORVASTATIN CALCIUM 80 MG: 40 TABLET, FILM COATED ORAL at 09:03

## 2022-03-14 RX ADMIN — HYDROCODONE BITARTRATE AND ACETAMINOPHEN 1 TABLET: 5; 325 TABLET ORAL at 08:03

## 2022-03-14 RX ADMIN — SODIUM BICARBONATE 650 MG TABLET 650 MG: at 09:03

## 2022-03-14 RX ADMIN — HYDROCODONE BITARTRATE AND ACETAMINOPHEN 1 TABLET: 5; 325 TABLET ORAL at 01:03

## 2022-03-14 RX ADMIN — MEROPENEM 2 G: 1 INJECTION INTRAVENOUS at 09:03

## 2022-03-14 RX ADMIN — RIVAROXABAN 2.5 MG: 2.5 TABLET, FILM COATED ORAL at 08:03

## 2022-03-14 RX ADMIN — RIVAROXABAN 2.5 MG: 2.5 TABLET, FILM COATED ORAL at 05:03

## 2022-03-14 RX ADMIN — SODIUM BICARBONATE 650 MG TABLET 650 MG: at 08:03

## 2022-03-14 RX ADMIN — SERTRALINE HYDROCHLORIDE 50 MG: 50 TABLET ORAL at 08:03

## 2022-03-14 RX ADMIN — CLOPIDOGREL 75 MG: 75 TABLET, FILM COATED ORAL at 08:03

## 2022-03-14 RX ADMIN — CARVEDILOL 3.12 MG: 3.12 TABLET, FILM COATED ORAL at 09:03

## 2022-03-14 RX ADMIN — NIFEDIPINE 30 MG: 30 TABLET, FILM COATED, EXTENDED RELEASE ORAL at 08:03

## 2022-03-14 RX ADMIN — MIRTAZAPINE 15 MG: 15 TABLET, FILM COATED ORAL at 09:03

## 2022-03-14 NOTE — PLAN OF CARE
[facility] - Surgery  Initial Discharge Assessment       Primary Care Provider: Magen Christensen MD    Admission Diagnosis: Syncope [R55]  Osteomyelitis [M86.9]  Chest pain [R07.9]    Admission Date: 3/11/2022  Expected Discharge Date: 3/14/2022         Payor: HU[employer] MANAGED MEDICARE / Plan: HU[employer] MEDICARE HMO / Product Type: Capitation /     Extended Emergency Contact Information  Primary Emergency Contact: Randell Connelly  Address: [address] [place] [address]  Home Phone: [phone]  Relation: Spouse  Secondary Emergency Contact: Cornelia Connelly  Address: [address] [place] [address] [id number] [place] [address]  Home Phone: [phone]  Relation: Daughter  Mother: Tea Sal   [place]  Mobile Phone: [phone]    Discharge Plan A: Home with family  Discharge Plan B: Home Health, Home with family      [id number] [place] - [facility] [facility] [address]  [facility] [place] [address]  Phone: [phone] Fax: [phone]      Initial Assessment (most recent)     Adult Discharge Assessment - 03/14/22 1006        Discharge Assessment    Assessment Type Discharge Planning Assessment     Confirmed/corrected address, phone number and insurance Yes     Confirmed Demographics Correct on Facesheet     Source of Information patient;family   spouse    Communicated DEVAN with patient/caregiver Yes     Lives With spouse     Do you expect to return to your current living situation? Yes     Do you have help at home or someone to help you manage your care at home? Yes     Who are your caregiver(s) and their phone number(s)? spouse     Prior to hospitilization cognitive status: Alert/Oriented     Current cognitive status: Alert/Oriented     Home Layout Able to live on 1st floor     Equipment Currently Used at Home wheelchair;glucometer     Do you currently have service(s) that help you  manage your care at home? No     Do you take prescription medications? Yes     Do you have prescription coverage? Yes     Do you have any problems affording any of your prescribed medications? No     Is the patient taking medications as prescribed? yes     How do you get to doctors appointments? family or friend will provide     Are you on dialysis? No     Do you take coumadin? No     Discharge Plan A Home with family     Discharge Plan B Home Health;Home with family     DME Needed Upon Discharge  none     Discharge Plan discussed with: Spouse/sig other;Patient               Patient lives with spouse in a single story home with three entry steps. Patient  is primary care giver and will provide help/assistance post hospital discharge. Patient expects to be discharge without post acute services. Her  will provide transportation home.

## 2022-03-14 NOTE — PROGRESS NOTES
[facility] - Surgery  Infectious Disease  Progress Note    Patient Name: Suyapa Connelly  MRN: [id number]  Admission Date: 3/11/2022  Length of Stay: 0 days  Attending Physician: Hitesh Quijano MD  Primary Care Provider: Magen Christensen MD    Isolation Status: No active isolations  Assessment/Plan:      * Infection of amputation stump of right lower extremity  55 y/o female with Hx of CAD s/p CABG, DM2 c/b DM foot/osteo s/p left AKA and right BKA 12/2021 with stump revision 1/22 c/b wound infection (ESBL klebsiella/pseudomonas) s/p meropenem x 2 weeks with ETD 2/7 ( CRP 2/3 ~83), recent admission for AMS and myoclonus 2/2 ROSLYN and medication SE (pregabalin/ mirtazapine)- Now presenting after syncope episode - She has been afebrile - wbc ~9 - CRP ~24 (decreased). She was started on vanc/zosyn- Abx been on hold to optimize culture data- MRI with early osteo- per primary IR recommend against bone biopsy at this time given open wound and risk of introducing infection.     Recommendations:    1.Given unable to obtain bone cultures to direct antibiotics - will start empiric meropenem (renally dosed) based on previous cultures anticipate 6 weeks course.  2.Recommend further discussion with vascular surgery about surgical plan.  3. Suspect GPC in blood contaminant.  4. Suspect GNR in Ucx colonization as patient been asymptomatic.            Thank you for your consult. I will follow-up with patient. Please contact us if you have any additional questions.    Carline Schwarz MD  Infectious Disease  [facility] - Surgery    Subjective:     Principal Problem:Infection of amputation stump of right lower extremity    HPI: Suyapa Connelly is 55 y/o female with Hx of CAD s/p CABG, DM2 c/b DM foot/osteo s/p left AKA and right BKA 12/2021 with stump revision 1/22 c/b wound infection (ESBL klebsiella/pseudomonas) s/p meropenem x 2 weeks with ETD 2/7 ( CRP 2/3 ~83), recent admission for AMS and myoclonus 2/2 ROSLYN and medication SE  (pregabalin/ mirtazapine) - Now presented with episode of syncope last few secs per her  - patient now back to baseline.    Patient and her  report that she was feeling nauseous (chronic nausea) and she became unresponsive for few sec with eyes opened no jerky movements. She denies any fever, chills, abdominal pain, dysuria or drainage from stump - she denies pain in the stump and  been doing her dressing.    Patient did not tolerate cipro 2/2 to itching but did tolerate levofloxacin.    ID consulted for concern for stump infection.    Interval History: no acute events noted.      Review of Systems   Constitutional:  Positive for activity change. Negative for appetite change, chills and fever.   HENT:  Negative for congestion.    Eyes:  Negative for pain and itching.   Respiratory:  Negative for cough, chest tightness and shortness of breath.    Cardiovascular:  Negative for palpitations and leg swelling.   Gastrointestinal:  Negative for abdominal pain and nausea.   Endocrine: Negative for polyphagia and polyuria.   Genitourinary:  Negative for dysuria and frequency.   Musculoskeletal:  Negative for back pain.   Neurological:  Negative for numbness and headaches.   Psychiatric/Behavioral:  Negative for agitation and behavioral problems.    Objective:     Vital Signs (Most Recent):  Temp: 97.6 °F (36.4 °C) (03/14/22 0519)  Pulse: 102 (03/14/22 0519)  Resp: 16 (03/14/22 0853)  BP: (!) 147/68 (03/14/22 0854)  SpO2: 98 % (03/14/22 0519)   Vital Signs (24h Range):  Temp:  [97.3 °F (36.3 °C)-98.1 °F (36.7 °C)] 97.6 °F (36.4 °C)  Pulse:  [] 102  Resp:  [15-18] 16  SpO2:  [97 %-100 %] 98 %  BP: (134-178)/(58-82) 147/68     Weight: 65.3 kg (144 lb)  Body mass index is 23.96 kg/m².    Estimated Creatinine Clearance: 30.1 mL/min (A) (based on SCr of 1.9 mg/dL (H)).    Physical Exam  Constitutional:       Appearance: Normal appearance.   HENT:      Head: Normocephalic and atraumatic.    Cardiovascular:      Rate and Rhythm: Normal rate.   Pulmonary:      Effort: Pulmonary effort is normal.   Abdominal:      General: Abdomen is flat.      Palpations: Abdomen is soft.   Musculoskeletal:      Comments: Left AKA and right bka with open wound   Skin:     General: Skin is warm and dry.   Neurological:      Mental Status: She is alert and oriented to person, place, and time.   Psychiatric:         Behavior: Behavior normal.       Significant Labs:   Microbiology Results (last 7 days)       Procedure Component Value Units Date/Time    Blood culture #2 **CANNOT BE ORDERED STAT** [[id number]]  (Abnormal) Collected: 03/12/22 0050    Order Status: Completed Specimen: Blood from Peripheral, Antecubital, Right Updated: 03/14/22 0824     Blood Culture, Routine Gram stain salvatore bottle: Gram positive cocci in clusters resembling Staph       Results called to and read back by:Monet Sanchez RN 03/13/2022  03:22      COAGULASE-NEGATIVE STAPHYLOCOCCUS SPECIES  Organism is a probable contaminant      Blood culture #1 **CANNOT BE ORDERED STAT** [[id number]] Collected: 03/12/22 0050    Order Status: Completed Specimen: Blood from Peripheral, Antecubital, Left Updated: 03/14/22 0613     Blood Culture, Routine No Growth to date      No Growth to date      No Growth to date    Blood culture [[id number]] Collected: 03/13/22 1237    Order Status: Completed Specimen: Blood Updated: 03/13/22 1915     Blood Culture, Routine No Growth to date    Blood culture [[id number]] Collected: 03/13/22 1237    Order Status: Completed Specimen: Blood Updated: 03/13/22 1915     Blood Culture, Routine No Growth to date    Urine culture [[id number]]  (Abnormal) Collected: 03/12/22 0835    Order Status: Completed Specimen: Urine Updated: 03/13/22 0732     Urine Culture, Routine GRAM NEGATIVE YSABEL  10,000 - 49,999 cfu/ml  Identification and susceptibility pending      Narrative:      Specimen Source->Urine            Significant Imaging: I have reviewed  all pertinent imaging results/findings within the past 24 hours.

## 2022-03-14 NOTE — PROGRESS NOTES
[facility] Medicine  Progress Note    Patient Name: Suyapa Connelly  MRN: [id number]  Patient Class: OP- Observation   Admission Date: 3/11/2022  Length of Stay: 0 days  Attending Physician: Hitesh Quijano MD  Primary Care Provider: Magen Christensen MD        Subjective:     Principal Problem:Infection of amputation stump of right lower extremity        HPI:  55 yo female with CAD s/p CABG, CKD3, DM2, osteomyelitis with previous left AKA and newer Right BKA (12/2021), and recent hospitalization for sepsis and wound infection presenting for nausea, syncope, vomitting. Symptoms resolved about 15 seconds and returned to baseline afterwards. She ultimately denies any current symptom; however she does report that she has been having trouble following up about her BKA and cleaning it at home. There is some purulent drainage from the site of her BKA however.         Overview/Hospital Course:  Pt admitted to  team DAYNE QUINONES consulted, recommended discontinuing abx given no leukocytosis and that she remained afebrile. Vascular surgery consulted and recommended that pt follow-up with her vascular team at [place]. MRI RLE obtained, which showed mild increased abnormal medullary signal within the distal 1 cm of the tibial stump with associated cortical indistinctness, findings that are concerning for early osteomyelitis; stable medullary edema within the distal 1 cm of the fibular stump with grossly preserved cortical margins, possibly reactive; persistent soft tissue edema about the tibial and fibular stumps with ill-defined fluid, possibly sterile or infectious in nature (no focal drainable fluid collection); prominent nonspecific myositis throughout the visualized anterior compartment of the thigh, sartorius and to a lesser extent the gracilis; abnormal signal intensity within the visualized superficial femoral and popliteal arteries presumably related to known thrombosis.    UCx with GNRs and Bcx with GPCs in  1/4 bottles: ID aware, still recommending to hold abx while cultures result. Pt and  reported that they do not wish to continue following with vascular at [place]. Discussed with [facility] vascular fellow (Aundrea) who stated he would schedule and outpatient appointment with Dr. Florez in the [facility].    BCx great coag-negative staph, deemed likely contaminant. ID recommended proceeding with inpatient bone biopsy. IR felt that surgical services might be more appropriate, and vascular still deferring for outpatient management. ID to consider 6 week of IV abx course.        Interval History:  Pt seen and examined this morning on rounds. NAKENYAON. Remains grossly asymptomatic. Discussed ongoing discussions with ID for best approach; both pt and  very understanding and in no rush to discharge pending dispo. Care plan reviewed. Otherwise, doing well and with no further complaints at this time.      Objective:     Vital Signs (Most Recent):  Temp: 98.5 °F (36.9 °C) (03/14/22 1150)  Pulse: 89 (03/14/22 1150)  Resp: 16 (03/14/22 1347)  BP: (!) 123/55 (03/14/22 1150)  SpO2: 96 % (03/14/22 1150)   Vital Signs (24h Range):  Temp:  [97.3 °F (36.3 °C)-98.5 °F (36.9 °C)] 98.5 °F (36.9 °C)  Pulse:  [] 89  Resp:  [16-20] 16  SpO2:  [96 %-99 %] 96 %  BP: (123-147)/(55-70) 123/55     Weight: 65.3 kg (144 lb)  Body mass index is 23.96 kg/m².    Intake/Output Summary (Last 24 hours) at 3/14/2022 1451  Last data filed at 3/14/2022 0500  Gross per 24 hour   Intake 240 ml   Output 1350 ml   Net -1110 ml        Physical Exam  Gen: in NAD, appears stated age  Neuro: AAOx4, CN2-12 grossly intact BL; motor, sensory, and strength grossly intact BL  HEENT: NTNC, EOMI, PERRLA, MMM; no thyromegaly or lymphadenopathy; no JVD appreciated  CVS: RRR, no m/r/g; S1/S2 auscultated with no S3 or S4; capillary refill < 2 sec  Resp: lungs CTAB, no w/r/r; no belabored breathing or accessory muscle use appreciated   Abd: BS+ in all 4  quadrants; NTND, soft to palpation; no organomegaly appreciated   Extrem: pulses full, equal, and regular over all 4 extremities; s/p right BKA and s/p right AKA; see image of RLE below          Significant Labs: All pertinent labs within the past 24 hours have been reviewed.    Significant Imaging: I have reviewed all pertinent imaging results/findings within the past 24 hours.      Assessment/Plan:      * Infection of amputation stump of right lower extremity  - Interval history and physical exam findings as described above  - MRI findings as documented  - Afebrile, no leukocytosis  - BCx with likely contaminant   - ID to provide final recommendations  - Vascular recommending outpatient management  - Will continue to montior      Class 2 severe obesity due to excess calories with serious comorbidity in adult  Morbid obesity complicates all aspects of disease management from diagnostic modalities to treatment. Weight loss encouraged and health benefits explained to patient.         Stage 3 chronic kidney disease  Likely elevated above baseline, will give small bolus of IVFs, follow creatinine      Paroxysmal atrial fibrillation  Patient with Paroxysmal (<7 days) atrial fibrillation which is controlled currently with home medications. Patient is currently in atrial fibrillation.[id number] Score: 3. Anticoagulation indicated. Anticoagulation done with xarelto.        Coronary atherosclerosis  Chronic, controlled, continue home meds    Type 2 diabetes mellitus with hyperglycemia, with long-term current use of insulin  Chronic, controlled, continue home insulin dosages at reduced dosages      Mixed hyperlipidemia  Chronic, controlled, continue statin      CAROLIN (generalized anxiety disorder)  Continue home medications  Chronic, controlled      Essential hypertension  Chronic, controlled, continue home meds      Peripheral vascular disease  Chronic, s/p bilateral amputations        VTE Risk Mitigation (From admission,  onward)         Ordered     IP VTE HIGH RISK PATIENT  Once         03/12/22 0326     Place sequential compression device  Until discontinued         03/12/22 0326     Reason for No Pharmacological VTE Prophylaxis  Once        Question:  Reasons:  Answer:  Already adequately anticoagulated on oral Anticoagulants    03/12/22 0326                Discharge Planning   DEVAN: 3/15/2022     Code Status: Full Code   Is the patient medically ready for discharge?: No    Reason for patient still in hospital (select all that apply): Patient trending condition  Discharge Plan A: Home with family            Hitesh Quijano MD  Attending Physician  Department of Hospital Medicine  Epic secure chat preferred, or SpectraLink ext. [phone]  3/14/2022

## 2022-03-14 NOTE — PROGRESS NOTES
[facility] - Surgery  Wound Care    Patient Name:  Suyapa Connelly   MRN:  [id number]  Date: 3/14/2022  Diagnosis: Infection of amputation stump of right lower extremity    History:     Past Medical History:   Diagnosis Date    Anxiety     Chronic pain syndrome     CKD (chronic kidney disease), stage III     Depression     Diabetes mellitus     type 2    Diabetes mellitus, type 2     GERD (gastroesophageal reflux disease)     Hyperemesis 3/23/2021    Hyperlipemia     Hypertension     Hypokalemia 3/23/2021    Myocardial infarction 2010    minor-caused by stress per pt.    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      Social Determinants of Health     Financial Resource Strain: High Risk    Difficulty of Paying Living Expenses: Hard   Food Insecurity: No Food Insecurity    Worried About Running Out of Food in the Last Year: Never true    Ran Out of Food in the Last Year: Never true   Transportation Needs: No Transportation Needs    Lack of Transportation (Medical): No    Lack of Transportation (Non-Medical): No   Stress: Stress Concern Present    Feeling of Stress : To some extent   Social Connections: Unknown    Frequency of Communication with Friends and Family: More than three times a week    Frequency of Social Gatherings with Friends and Family: Three times a week    Marital Status:    Housing Stability: Low Risk     Unable to Pay for Housing in the Last Year: No    Number of Places Lived in the Last Year: 1    Unstable  Housing in the Last Year: No       Precautions:     Allergies as of 03/11/2022 - Reviewed 03/11/2022   Allergen Reaction Noted    Ciprofloxacin Itching 07/18/2020    Contrast media  01/10/2020    Iodine  01/10/2020       [facility] Assessment Details/Treatment     Wound care consult from MD completed for assessment of RBKA incision      03/14/22 0956        Incision/Site 01/24/22 1200 Right Knee   Date First Assessed/Time First Assessed: 01/24/22 1200   Side: Right  Location: Knee   Wound Image    Incision WDL ex   Dressing Appearance Open to air;Dry;No dressing   Drainage Amount None   Drainage Characteristics/Odor No odor   Appearance Intact;Eschar;Dry   Care Cleansed with:;Wound cleanser   Dressing Applied;Honey;Island/border     Wound consult from MD for assessment of RBKA incision.Recommendations made to primary team to cleanse area with sea-clen wound cleanser, pat dry, apply medihoney directly to non-adherent side of island dressing. Cover incision with the medihoney side to the wound bed. RN and MD aware. Orders placed. Nursing to continue skin prevention interventions. Re-consult wound care as needed.     03/14/2022

## 2022-03-14 NOTE — ASSESSMENT & PLAN NOTE
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 She was started on vanc/zosyn- Abx been on hold to optimize culture data- MRI with early osteo- per primary IR recommend against bone biopsy at this time given open wound and risk of introducing infection.     Recommendations:    1.Given unable to obtain bone cultures to direct antibiotics - will start empiric meropenem (renally dosed) based on previous cultures anticipate 6 weeks course.  2.Recommend further discussion with vascular surgery about surgical plan.  3. Suspect GPC in blood contaminant.  4. Suspect GNR in Ucx colonization as patient been asymptomatic.

## 2022-03-14 NOTE — PLAN OF CARE
Problem: Adult Inpatient Plan of Care  Goal: Plan of Care Review  Outcome: Ongoing, Progressing  Goal: Patient-Specific Goal (Individualized)  Outcome: Ongoing, Progressing  Goal: Absence of Hospital-Acquired Illness or Injury  Outcome: Ongoing, Progressing  Goal: Optimal Comfort and Wellbeing  Outcome: Ongoing, Progressing  Goal: Readiness for Transition of Care  Outcome: Ongoing, Progressing     Problem: Diabetes Comorbidity  Goal: Blood Glucose Level Within Targeted Range  Outcome: Ongoing, Progressing     Problem: Adjustment to Illness (Sepsis/Septic Shock)  Goal: Optimal Coping  Outcome: Ongoing, Progressing     Problem: Bleeding (Sepsis/Septic Shock)  Goal: Absence of Bleeding  Outcome: Ongoing, Progressing     Problem: Glycemic Control Impaired (Sepsis/Septic Shock)  Goal: Blood Glucose Level Within Desired Range  Outcome: Ongoing, Progressing     Problem: Infection Progression (Sepsis/Septic Shock)  Goal: Absence of Infection Signs and Symptoms  Outcome: Ongoing, Progressing     Problem: Nutrition Impaired (Sepsis/Septic Shock)  Goal: Optimal Nutrition Intake  Outcome: Ongoing, Progressing     Problem: Fluid and Electrolyte Imbalance (Acute Kidney Injury/Impairment)  Goal: Fluid and Electrolyte Balance  Outcome: Ongoing, Progressing     Problem: Oral Intake Inadequate (Acute Kidney Injury/Impairment)  Goal: Optimal Nutrition Intake  Outcome: Ongoing, Progressing     Problem: Renal Function Impairment (Acute Kidney Injury/Impairment)  Goal: Effective Renal Function  Outcome: Ongoing, Progressing     Problem: Infection  Goal: Absence of Infection Signs and Symptoms  Outcome: Ongoing, Progressing     Problem: Impaired Wound Healing  Goal: Optimal Wound Healing  Outcome: Ongoing, Progressing     Problem: Skin Injury Risk Increased  Goal: Skin Health and Integrity  Outcome: Ongoing, Progressing     Problem: Fall Injury Risk  Goal: Absence of Fall and Fall-Related Injury  Outcome: Ongoing, Progressing

## 2022-03-14 NOTE — ASSESSMENT & PLAN NOTE
- Interval history and physical exam findings as described above  - MRI findings as documented  - Afebrile, no leukocytosis  - BCx with likely contaminant   - ID to provide final recommendations  - Vascular recommending outpatient management  - Will continue to montior

## 2022-03-14 NOTE — SUBJECTIVE & OBJECTIVE
Interval History: no acute events noted.      Review of Systems   Constitutional:  Positive for activity change. Negative for appetite change, chills and fever.   HENT:  Negative for congestion.    Eyes:  Negative for pain and itching.   Respiratory:  Negative for cough, chest tightness and shortness of breath.    Cardiovascular:  Negative for palpitations and leg swelling.   Gastrointestinal:  Negative for abdominal pain and nausea.   Endocrine: Negative for polyphagia and polyuria.   Genitourinary:  Negative for dysuria and frequency.   Musculoskeletal:  Negative for back pain.   Neurological:  Negative for numbness and headaches.   Psychiatric/Behavioral:  Negative for agitation and behavioral problems.    Objective:     Vital Signs (Most Recent):  Temp: 97.6 °F (36.4 °C) (03/14/22 0519)  Pulse: 102 (03/14/22 0519)  Resp: 16 (03/14/22 0853)  BP: (!) 147/68 (03/14/22 0854)  SpO2: 98 % (03/14/22 0519)   Vital Signs (24h Range):  Temp:  [97.3 °F (36.3 °C)-98.1 °F (36.7 °C)] 97.6 °F (36.4 °C)  Pulse:  [] 102  Resp:  [15-18] 16  SpO2:  [97 %-100 %] 98 %  BP: (134-178)/(58-82) 147/68     Weight: 65.3 kg (144 lb)  Body mass index is 23.96 kg/m².    Estimated Creatinine Clearance: 30.1 mL/min (A) (based on SCr of 1.9 mg/dL (H)).    Physical Exam  Constitutional:       Appearance: Normal appearance.   HENT:      Head: Normocephalic and atraumatic.   Cardiovascular:      Rate and Rhythm: Normal rate.   Pulmonary:      Effort: Pulmonary effort is normal.   Abdominal:      General: Abdomen is flat.      Palpations: Abdomen is soft.   Musculoskeletal:      Comments: Left AKA and right bka with open wound   Skin:     General: Skin is warm and dry.   Neurological:      Mental Status: She is alert and oriented to person, place, and time.   Psychiatric:         Behavior: Behavior normal.       Significant Labs:   Microbiology Results (last 7 days)       Procedure Component Value Units Date/Time    Blood culture #2 **CANNOT  BE ORDERED STAT** [[id number]]  (Abnormal) Collected: 03/12/22 0050    Order Status: Completed Specimen: Blood from Peripheral, Antecubital, Right Updated: 03/14/22 0824     Blood Culture, Routine Gram stain salvatore bottle: Gram positive cocci in clusters resembling Staph       Results called to and read back by:Monet Sanchez RN 03/13/2022  03:22      COAGULASE-NEGATIVE STAPHYLOCOCCUS SPECIES  Organism is a probable contaminant      Blood culture #1 **CANNOT BE ORDERED STAT** [[id number]] Collected: 03/12/22 0050    Order Status: Completed Specimen: Blood from Peripheral, Antecubital, Left Updated: 03/14/22 0613     Blood Culture, Routine No Growth to date      No Growth to date      No Growth to date    Blood culture [[id number]] Collected: 03/13/22 1237    Order Status: Completed Specimen: Blood Updated: 03/13/22 1915     Blood Culture, Routine No Growth to date    Blood culture [[id number]] Collected: 03/13/22 1237    Order Status: Completed Specimen: Blood Updated: 03/13/22 1915     Blood Culture, Routine No Growth to date    Urine culture [[id number]]  (Abnormal) Collected: 03/12/22 0835    Order Status: Completed Specimen: Urine Updated: 03/13/22 0732     Urine Culture, Routine GRAM NEGATIVE YSABEL  10,000 - 49,999 cfu/ml  Identification and susceptibility pending      Narrative:      Specimen Source->Urine            Significant Imaging: I have reviewed all pertinent imaging results/findings within the past 24 hours.

## 2022-03-14 NOTE — SUBJECTIVE & OBJECTIVE
Interval History:  Pt seen and examined this morning on rounds. PEE. Remains grossly asymptomatic. Discussed ongoing discussions with ID for best approach; both pt and  very understanding and in no rush to discharge pending dispo. Care plan reviewed. Otherwise, doing well and with no further complaints at this time.      Objective:     Vital Signs (Most Recent):  Temp: 98.5 °F (36.9 °C) (03/14/22 1150)  Pulse: 89 (03/14/22 1150)  Resp: 16 (03/14/22 1347)  BP: (!) 123/55 (03/14/22 1150)  SpO2: 96 % (03/14/22 1150)   Vital Signs (24h Range):  Temp:  [97.3 °F (36.3 °C)-98.5 °F (36.9 °C)] 98.5 °F (36.9 °C)  Pulse:  [] 89  Resp:  [16-20] 16  SpO2:  [96 %-99 %] 96 %  BP: (123-147)/(55-70) 123/55     Weight: 65.3 kg (144 lb)  Body mass index is 23.96 kg/m².    Intake/Output Summary (Last 24 hours) at 3/14/2022 1451  Last data filed at 3/14/2022 0500  Gross per 24 hour   Intake 240 ml   Output 1350 ml   Net -1110 ml        Physical Exam  Gen: in NAD, appears stated age  Neuro: AAOx4, CN2-12 grossly intact BL; motor, sensory, and strength grossly intact BL  HEENT: NTNC, EOMI, PERRLA, MMM; no thyromegaly or lymphadenopathy; no JVD appreciated  CVS: RRR, no m/r/g; S1/S2 auscultated with no S3 or S4; capillary refill < 2 sec  Resp: lungs CTAB, no w/r/r; no belabored breathing or accessory muscle use appreciated   Abd: BS+ in all 4 quadrants; NTND, soft to palpation; no organomegaly appreciated   Extrem: pulses full, equal, and regular over all 4 extremities; s/p right BKA and s/p right AKA; see image of RLE below          Significant Labs: All pertinent labs within the past 24 hours have been reviewed.    Significant Imaging: I have reviewed all pertinent imaging results/findings within the past 24 hours.

## 2022-03-15 ENCOUNTER — PATIENT MESSAGE (OUTPATIENT)
Dept: INFECTIOUS DISEASES | Facility: CLINIC | Age: 57
End: 2022-03-15
Payer: MEDICARE

## 2022-03-15 PROBLEM — N39.0 URINARY TRACT INFECTION DUE TO ESBL KLEBSIELLA: Status: ACTIVE | Noted: 2022-03-15

## 2022-03-15 PROBLEM — B96.89 URINARY TRACT INFECTION DUE TO ESBL KLEBSIELLA: Status: ACTIVE | Noted: 2022-03-15

## 2022-03-15 LAB
ALBUMIN SERPL BCP-MCNC: 2.2 G/DL (ref 3.5–5.2)
ALP SERPL-CCNC: 94 U/L (ref 55–135)
ALT SERPL W/O P-5'-P-CCNC: 6 U/L (ref 10–44)
ANION GAP SERPL CALC-SCNC: 11 MMOL/L (ref 8–16)
AST SERPL-CCNC: 11 U/L (ref 10–40)
BACTERIA BLD CULT: ABNORMAL
BASOPHILS # BLD AUTO: 0.06 K/UL (ref 0–0.2)
BASOPHILS NFR BLD: 0.8 % (ref 0–1.9)
BILIRUB SERPL-MCNC: 0.3 MG/DL (ref 0.1–1)
BUN SERPL-MCNC: 47 MG/DL (ref 6–20)
CALCIUM SERPL-MCNC: 9.5 MG/DL (ref 8.7–10.5)
CHLORIDE SERPL-SCNC: 104 MMOL/L (ref 95–110)
CO2 SERPL-SCNC: 21 MMOL/L (ref 23–29)
CREAT SERPL-MCNC: 1.6 MG/DL (ref 0.5–1.4)
DIFFERENTIAL METHOD: ABNORMAL
EOSINOPHIL # BLD AUTO: 0.2 K/UL (ref 0–0.5)
EOSINOPHIL NFR BLD: 2.6 % (ref 0–8)
ERYTHROCYTE [DISTWIDTH] IN BLOOD BY AUTOMATED COUNT: 16.5 % (ref 11.5–14.5)
EST. GFR  (AFRICAN AMERICAN): 41.5 ML/MIN/1.73 M^2
EST. GFR  (NON AFRICAN AMERICAN): 36 ML/MIN/1.73 M^2
GLUCOSE SERPL-MCNC: 186 MG/DL (ref 70–110)
HCT VFR BLD AUTO: 30.4 % (ref 37–48.5)
HGB BLD-MCNC: 9.6 G/DL (ref 12–16)
IMM GRANULOCYTES # BLD AUTO: 0.02 K/UL (ref 0–0.04)
IMM GRANULOCYTES NFR BLD AUTO: 0.3 % (ref 0–0.5)
INR PPP: 1.1 (ref 0.8–1.2)
LYMPHOCYTES # BLD AUTO: 1.5 K/UL (ref 1–4.8)
LYMPHOCYTES NFR BLD: 20.8 % (ref 18–48)
MCH RBC QN AUTO: 30 PG (ref 27–31)
MCHC RBC AUTO-ENTMCNC: 31.6 G/DL (ref 32–36)
MCV RBC AUTO: 95 FL (ref 82–98)
MONOCYTES # BLD AUTO: 0.7 K/UL (ref 0.3–1)
MONOCYTES NFR BLD: 10.1 % (ref 4–15)
NEUTROPHILS # BLD AUTO: 4.8 K/UL (ref 1.8–7.7)
NEUTROPHILS NFR BLD: 65.4 % (ref 38–73)
NRBC BLD-RTO: 0 /100 WBC
PLATELET # BLD AUTO: 405 K/UL (ref 150–450)
PMV BLD AUTO: 10.6 FL (ref 9.2–12.9)
POCT GLUCOSE: 180 MG/DL (ref 70–110)
POCT GLUCOSE: 198 MG/DL (ref 70–110)
POCT GLUCOSE: 205 MG/DL (ref 70–110)
POCT GLUCOSE: 357 MG/DL (ref 70–110)
POTASSIUM SERPL-SCNC: 5.1 MMOL/L (ref 3.5–5.1)
PROT SERPL-MCNC: 7.2 G/DL (ref 6–8.4)
PROTHROMBIN TIME: 10.9 SEC (ref 9–12.5)
RBC # BLD AUTO: 3.2 M/UL (ref 4–5.4)
SODIUM SERPL-SCNC: 136 MMOL/L (ref 136–145)
WBC # BLD AUTO: 7.34 K/UL (ref 3.9–12.7)

## 2022-03-15 PROCEDURE — 36415 COLL VENOUS BLD VENIPUNCTURE: CPT | Performed by: STUDENT IN AN ORGANIZED HEALTH CARE EDUCATION/TRAINING PROGRAM

## 2022-03-15 PROCEDURE — 99223 1ST HOSP IP/OBS HIGH 75: CPT | Mod: GC,,, | Performed by: SURGERY

## 2022-03-15 PROCEDURE — 99233 SBSQ HOSP IP/OBS HIGH 50: CPT | Mod: ,,, | Performed by: STUDENT IN AN ORGANIZED HEALTH CARE EDUCATION/TRAINING PROGRAM

## 2022-03-15 PROCEDURE — 63600175 PHARM REV CODE 636 W HCPCS: Performed by: STUDENT IN AN ORGANIZED HEALTH CARE EDUCATION/TRAINING PROGRAM

## 2022-03-15 PROCEDURE — 99233 SBSQ HOSP IP/OBS HIGH 50: CPT | Mod: GC,,, | Performed by: INTERNAL MEDICINE

## 2022-03-15 PROCEDURE — 99223 PR INITIAL HOSPITAL CARE,LEVL III: ICD-10-PCS | Mod: GC,,, | Performed by: SURGERY

## 2022-03-15 PROCEDURE — 25000003 PHARM REV CODE 250: Performed by: INTERNAL MEDICINE

## 2022-03-15 PROCEDURE — 25000003 PHARM REV CODE 250: Performed by: STUDENT IN AN ORGANIZED HEALTH CARE EDUCATION/TRAINING PROGRAM

## 2022-03-15 PROCEDURE — 11000001 HC ACUTE MED/SURG PRIVATE ROOM

## 2022-03-15 PROCEDURE — 99233 PR SUBSEQUENT HOSPITAL CARE,LEVL III: ICD-10-PCS | Mod: ,,, | Performed by: STUDENT IN AN ORGANIZED HEALTH CARE EDUCATION/TRAINING PROGRAM

## 2022-03-15 PROCEDURE — 80053 COMPREHEN METABOLIC PANEL: CPT | Performed by: STUDENT IN AN ORGANIZED HEALTH CARE EDUCATION/TRAINING PROGRAM

## 2022-03-15 PROCEDURE — 85025 COMPLETE CBC W/AUTO DIFF WBC: CPT | Performed by: STUDENT IN AN ORGANIZED HEALTH CARE EDUCATION/TRAINING PROGRAM

## 2022-03-15 PROCEDURE — 99233 PR SUBSEQUENT HOSPITAL CARE,LEVL III: ICD-10-PCS | Mod: GC,,, | Performed by: INTERNAL MEDICINE

## 2022-03-15 PROCEDURE — 85610 PROTHROMBIN TIME: CPT | Performed by: STUDENT IN AN ORGANIZED HEALTH CARE EDUCATION/TRAINING PROGRAM

## 2022-03-15 PROCEDURE — 27000207 HC ISOLATION

## 2022-03-15 RX ADMIN — INSULIN ASPART 4 UNITS: 100 INJECTION, SOLUTION INTRAVENOUS; SUBCUTANEOUS at 05:03

## 2022-03-15 RX ADMIN — RIVAROXABAN 2.5 MG: 2.5 TABLET, FILM COATED ORAL at 09:03

## 2022-03-15 RX ADMIN — ALLOPURINOL 100 MG: 100 TABLET ORAL at 09:03

## 2022-03-15 RX ADMIN — SODIUM BICARBONATE 650 MG TABLET 650 MG: at 08:03

## 2022-03-15 RX ADMIN — INSULIN ASPART 4 UNITS: 100 INJECTION, SOLUTION INTRAVENOUS; SUBCUTANEOUS at 09:03

## 2022-03-15 RX ADMIN — CARVEDILOL 3.12 MG: 3.12 TABLET, FILM COATED ORAL at 08:03

## 2022-03-15 RX ADMIN — CARVEDILOL 3.12 MG: 3.12 TABLET, FILM COATED ORAL at 09:03

## 2022-03-15 RX ADMIN — ATORVASTATIN CALCIUM 80 MG: 40 TABLET, FILM COATED ORAL at 08:03

## 2022-03-15 RX ADMIN — INSULIN ASPART 4 UNITS: 100 INJECTION, SOLUTION INTRAVENOUS; SUBCUTANEOUS at 01:03

## 2022-03-15 RX ADMIN — SERTRALINE HYDROCHLORIDE 50 MG: 50 TABLET ORAL at 09:03

## 2022-03-15 RX ADMIN — SODIUM BICARBONATE 650 MG TABLET 650 MG: at 09:03

## 2022-03-15 RX ADMIN — NIFEDIPINE 30 MG: 30 TABLET, FILM COATED, EXTENDED RELEASE ORAL at 09:03

## 2022-03-15 RX ADMIN — CLOPIDOGREL 75 MG: 75 TABLET, FILM COATED ORAL at 09:03

## 2022-03-15 RX ADMIN — MEROPENEM 2 G: 1 INJECTION INTRAVENOUS at 01:03

## 2022-03-15 RX ADMIN — HYDROCODONE BITARTRATE AND ACETAMINOPHEN 1 TABLET: 5; 325 TABLET ORAL at 09:03

## 2022-03-15 RX ADMIN — INSULIN DETEMIR 9 UNITS: 100 INJECTION, SOLUTION SUBCUTANEOUS at 08:03

## 2022-03-15 RX ADMIN — MIRTAZAPINE 15 MG: 15 TABLET, FILM COATED ORAL at 08:03

## 2022-03-15 RX ADMIN — INSULIN ASPART 2 UNITS: 100 INJECTION, SOLUTION INTRAVENOUS; SUBCUTANEOUS at 09:03

## 2022-03-15 RX ADMIN — FUROSEMIDE 40 MG: 40 TABLET ORAL at 09:03

## 2022-03-15 NOTE — H&P
Vascular and Interventional Radiology History & Physical    Date:  3/15/2022      History of Present Illness:  Suyapa Connelly is a 56 y.o. female w/ b/l leg amputations w/ osteomyelitis. IR consulted for tunneled PICC placement for IV antibiotics.     Past Medical History:  Past Medical History:   Diagnosis Date    Anxiety     Chronic pain syndrome     CKD (chronic kidney disease), stage III     Depression     Diabetes mellitus     type 2    Diabetes mellitus, type 2     GERD (gastroesophageal reflux disease)     Hyperemesis 3/23/2021    Hyperlipemia     Hypertension     Hypokalemia 3/23/2021    Myocardial infarction 2010    minor-caused by stress per pt.    Osteomyelitis     Osteomyelitis of left foot 4/30/2021    PVD (peripheral vascular disease)     Vaginal delivery     x1       Past Surgical History:  Past Surgical History:   Procedure Laterality Date    ABOVE-KNEE AMPUTATION Left 5/18/2021    Procedure: AMPUTATION, ABOVE KNEE;  Surgeon: Teddy Huber MD;  Location: [facility] OR [address] [place] [address];  Service: Vascular;  Laterality: Left;    Angiogram - Right Extremity Right 7/9/15    angiogram-left leg  10/6/15    ANGIOGRAPHY OF LOWER EXTREMITY Left 4/29/2021    Procedure: ANGIOGRAM, LOWER EXTREMITY;  Surgeon: Teddy Huber MD;  Location: [facility] OR [address] [place] [address];  Service: Vascular;  Laterality: Left;    BELOW KNEE AMPUTATION OF LOWER EXTREMITY Right 12/28/2021    Procedure: AMPUTATION, BELOW KNEE;  Surgeon: Kaitlyn Rojas MD;  Location: [facility];  Service: General;  Laterality: Right;    CATHETERIZATION OF BOTH LEFT AND RIGHT HEART N/A 12/18/2019    Procedure: CATHETERIZATION, HEART, BOTH LEFT AND RIGHT;  Surgeon: Que Fernando III, MD;  [facility];  Service: Cardiology;  Laterality: N/A;    CORONARY ANGIOGRAPHY N/A 12/18/2019    Procedure: ANGIOGRAM, CORONARY ARTERY;  Surgeon: Que Fernando III, MD;  [facility];  Service: Cardiology;  Laterality: N/A;     CORONARY ANGIOGRAPHY INCLUDING BYPASS GRAFTS WITH CATHETERIZATION OF LEFT HEART N/A 7/28/2020    Procedure: ANGIOGRAM, CORONARY, INCLUDING BYPASS GRAFT, WITH LEFT HEART CATHETERIZATION, 9 am;  Surgeon: Rachel Easley MD;  Location: [facility] CATH LAB;  Service: Cardiology;  Laterality: N/A;    CORONARY ARTERY BYPASS GRAFT (CABG) N/A 1/14/2020    Procedure: CORONARY ARTERY BYPASS GRAFT (CABG) x 1     Off Pump;  Surgeon: Huang Altamirano MD;  Location: [facility] OR [address] [place] [address];  Service: Cardiovascular;  Laterality: N/A;    CREATION OF FEMORAL-TIBIAL ARTERY BYPASS Left 4/29/2021    Procedure: CREATION, BYPASS, ARTERIAL, FEMORAL TO ANTERIOR TIBIAL;  Surgeon: Teddy Huber MD;  Location: [facility] OR [address] [place] [address];  Service: Vascular;  Laterality: Left;    CREATION OF FEMOROPOPLITEAL ARTERIAL BYPASS USING GRAFT Left 8/18/2020    Procedure: CREATION, BYPASS, ARTERIAL, FEMORAL TO POPLITEAL, USING GRAFT, LEFT LOWER EXTREMITY;  Surgeon: Teddy Huber MD;  Location: [facility] OR;  Service: Vascular;  Laterality: Left;  REQUEST 7:15 A.M. START----COVID NEGATIVE ON 8/17  1ST CASE STARTKENYA DUEÑAS ON 8/7/2020 @ 942AM-LO  RN PREOP 8/12/2020   T/S-----CLEARED BY CARDS-------PENDING INSURANCE    DEBRIDEMENT OF FOOT Left 9/8/2020    Procedure: DEBRIDEMENT, FOOT;  Surgeon: Rosio Mayes DPM;  Location: [facility] OR;  Service: Podiatry;  Laterality: Left;  request neoxx .   RN Pre Op 9-4-2020, Covid negative on 9/5/20. C A    DEBRIDEMENT OF FOOT  3/4/2021    Procedure: DEBRIDEMENT, FOOT;  Surgeon: Teddy Huber MD;  Location: [facility] OR;  Service: Vascular;;    DEBRIDEMENT OF FOOT Left 3/9/2021    Procedure: DEBRIDEMENT, FOOT, bone biopsy;  Surgeon: Rosio Mayes DPM;  Location: [facility] OR;  Service: Podiatry;  Laterality: Left;  Request neoxx---COVID IN AM  REQUESTING NOON START  RN Phone Pre op.On Blood thinners Plavix and Eliquis.  Covid am of surgery. C A    DEBRIDEMENT OF FOOT Left 5/4/2021    Procedure: DEBRIDEMENT, FOOT;   Surgeon: Farooq Morley DPM;  [facility];  Service: Podiatry;  Laterality: Left;    INSERTION OF TUNNELED CENTRAL VENOUS HEMODIALYSIS CATHETER N/A 1/27/2020    Procedure: Insertion, Catheter, Central Venous, Hemodialysis;  Surgeon: ESTEBAN Gomez III, MD;  Location: [facility] CATH LAB;  Service: Peripheral Vascular;  Laterality: N/A;    PERCUTANEOUS TRANSLUMINAL ANGIOPLASTY N/A 3/4/2021    Procedure: PTA (ANGIOPLASTY, PERCUTANEOUS, TRANSLUMINAL);  Surgeon: Teddy Huber MD;  Location: [facility] OR;  Service: Vascular;  Laterality: N/A;    REMOVAL OF ARTERIOVENOUS GRAFT Left 5/27/2021    Procedure: REMOVAL, GRAFT, LEFT LOWER EXTREMITY, WOUND EXPLORATION;  Surgeon: Teddy Huber MD;  [address];  Service: Vascular;  Laterality: Left;    REMOVAL OF NAIL OF DIGIT Left 3/9/2021    Procedure: REMOVAL, NAIL, DIGIT;  Surgeon: Rosio Mayes DPM;  Location: [facility] OR;  Service: Podiatry;  Laterality: Left;    THROMBECTOMY Left 3/4/2021    Procedure: THROMBECTOMY, LEFT LOWER EXTREMITY BYPASS GRAFT, ANGIOGRAM, POSSIBLE INTERVENTION, POSSIBLE LEFT LOWER EXTREMITY BYPASS;  Surgeon: Teddy Huber MD;  Location: [facility] OR;  Service: Vascular;  Laterality: Left;    THROMBECTOMY Left 4/29/2021    Procedure: GRAFT THROMBECTOMY, LEFT LOWER EXTREMITY;  Surgeon: Teddy Huber MD;  [address];  Service: Vascular;  Laterality: Left;  14.5 min  1179.85 mGy  341.01 Gycm2  240 ml dye    THROMBECTOMY  10/22/2021    Procedure: THROMBECTOMY;  Surgeon: Saad Arenas MD;  Location: [facility] CATH LAB/EP;  Service: Cardiology;;        Sedation History:    Denies any adverse reactions.  Denies problems laying flat.    Social History:  Social History     Tobacco Use    Smoking status: Former Smoker    Smokeless tobacco: Never Used   Substance Use Topics    Alcohol use: No    Drug use: Yes     Types: Marijuana     Comment: occassional        Home Medications:   Prior to  Admission medications    Medication Sig Start Date End Date Taking? Authorizing Provider   acetaminophen (TYLENOL) 500 MG tablet Take 2 tablets (1,000 mg total) by mouth every 8 (eight) hours as needed for Pain. 6/25/21   Ruma Schulte MD   allopurinoL (ZYLOPRIM) 100 MG tablet Take 1 tablet (100 mg total) by mouth once daily. 7/31/20   Hitesh Mason MD   atorvastatin (LIPITOR) 80 MG tablet Take 1 tablet (80 mg total) by mouth every evening. 6/8/21 6/8/22  NONI Hanna MD   blood-glucose meter,continuous (DEXCOM G6 ) Misc Use to check blood sugars 4 times/day 12/3/21   Fawad Jameson MD   blood-glucose sensor (DEXCOM G6 SENSOR) Radha Apply one sensor to skin every 10 days 12/3/21   Fawad Jameson MD   blood-glucose transmitter (DEXCOM G6 TRANSMITTER) Radha Replace transmitter every 3 months to use with dexcom sensor 12/3/21   Fawad Jameson MD   carvediloL (COREG) 3.125 MG tablet Take 1 tablet (3.125 mg total) by mouth 2 (two) times daily. 2/6/22 2/6/23  Tracy Blair MD   clopidogreL (PLAVIX) 75 mg tablet Take 1 tablet (75 mg total) by mouth once daily. 10/22/21   Saad Arenas MD   furosemide (LASIX) 40 MG tablet Take 1 tablet (40 mg total) by mouth once daily. Please hold until follow up with PCP 2/3/22   Tracy Blair MD   HYDROcodone-acetaminophen (NORCO) 5-325 mg per tablet Take 1 tablet by mouth every 4 (four) hours as needed for Pain. 1/11/22   Guy J. Lefort, MD   insulin aspart U-100 (NOVOLOG FLEXPEN U-100 INSULIN) 100 unit/mL (3 mL) InPn pen Inject 5 Units into the skin 3 (three) times daily with meals. If not eating, ok to hold 4/13/21   Maria Del Rosario Moreira NP   insulin detemir U-100 (LEVEMIR FLEXTOUCH) 100 unit/mL (3 mL) SubQ InPn pen Inject 12 Units into the skin every evening. 1/29/22 1/29/23  Tawnya Montes MD   magnesium oxide (MAG-OX) 400 mg (241.3 mg magnesium) tablet Take 1 tablet (400 mg total) by mouth 2 (two) times daily. 6/25/21   Ruma Schulte MD    melatonin (MELATIN) 3 mg tablet Take 2 tablets (6 mg total) by mouth nightly as needed for Insomnia. 6/25/21   Ruma Schulte MD   mirtazapine (REMERON) 7.5 MG Tab Take 2 tablets (15 mg total) by mouth nightly. 1/10/22 1/10/23  Ruben Flood MD   multivit/folic acid/vit K1 (ONE-A-DAY WOMEN'S 50 PLUS ORAL) Take 1 tablet by mouth Daily.    Historical Provider   ondansetron (ZOFRAN-ODT) 8 MG TbDL Dissolve 1 tablet (8 mg total) by mouth every 8 (eight) hours as needed. 2/3/22   Tracy Blair MD   oxyCODONE-acetaminophen (PERCOCET)  mg per tablet Take 1 tablet by mouth every 6 (six) hours as needed. 1/29/22   Tawnya Montes MD   rivaroxaban (XARELTO) 2.5 mg Tab Take 1 tablet (2.5 mg total) by mouth 2 (two) times daily with meals. 2/5/22   Tracy Blair MD   sertraline (ZOLOFT) 50 MG tablet Take 1 tablet (50 mg total) by mouth once daily. 1/11/22 1/11/23  Ruben Flood MD   sodium bicarbonate 650 MG tablet Take 1 tablet (650 mg total) by mouth 2 (two) times daily. Please hold until follow up with Primary Care Provider 2/3/22 2/3/23  Tracy Blair MD   lancing device Misc 1 Device by Misc.(Non-Drug; Combo Route) route 2 (two) times daily with meals. 5/7/18 2/24/21  Rosales Barotn MD       Inpatient Medications:    Current Facility-Administered Medications:     acetaminophen tablet 650 mg, 650 mg, Oral, Q8H PRN, Dav Mao MD    albuterol-ipratropium 2.5 mg-0.5 mg/3 mL nebulizer solution 3 mL, 3 mL, Nebulization, Q6H PRN, Dav Mao MD    allopurinoL tablet 100 mg, 100 mg, Oral, Daily, Dav Mao MD, 100 mg at 03/15/22 0912    atorvastatin tablet 80 mg, 80 mg, Oral, QHS, Dav Mao MD, 80 mg at 03/14/22 2146    carvediloL tablet 3.125 mg, 3.125 mg, Oral, BID, Dav Mao MD, 3.125 mg at 03/15/22 0912    clopidogreL tablet 75 mg, 75 mg, Oral, Daily, Dav Mao MD, 75 mg at 03/15/22 0911    dextrose 10% bolus 125 mL, 12.5 g, Intravenous, PRN, Dav Mao MD, Stopped at  03/12/22 1202    dextrose 10% bolus 250 mL, 25 g, Intravenous, PRN, Dav Mao MD    furosemide tablet 40 mg, 40 mg, Oral, Daily, Dav Mao MD, 40 mg at 03/15/22 0911    glucagon (human recombinant) injection 1 mg, 1 mg, Intramuscular, PRN, Dav Mao MD    glucose chewable tablet 16 g, 16 g, Oral, PRN, Dav Mao MD    glucose chewable tablet 24 g, 24 g, Oral, PRN, Dav Mao MD    HYDROcodone-acetaminophen 5-325 mg per tablet 1 tablet, 1 tablet, Oral, Q4H PRN, Dav Mao MD, 1 tablet at 03/15/22 0912    insulin aspart U-100 pen 0-5 Units, 0-5 Units, Subcutaneous, QID (AC + HS) PRN, Dav Mao MD, 2 Units at 03/15/22 0915    insulin aspart U-100 pen 4 Units, 4 Units, Subcutaneous, TID WM, Dav Mao MD, 4 Units at 03/15/22 0914    insulin detemir U-100 pen 9 Units, 9 Units, Subcutaneous, QHS, Dav Mao MD, 9 Units at 03/14/22 2154    melatonin tablet 6 mg, 6 mg, Oral, Nightly PRN, Dav Mao MD    meropenem (MERREM) 2 g in sodium chloride 0.9% 100 mL IVPB, 2 g, Intravenous, Q12H, Carline Schwarz MD, Last Rate: 100 mL/hr at 03/14/22 2151, 2 g at 03/14/22 2151    mirtazapine tablet 15 mg, 15 mg, Oral, Nightly, Dav Mao MD, 15 mg at 03/14/22 2146    naloxone 0.4 mg/mL injection 0.02 mg, 0.02 mg, Intravenous, PRN, Dav Mao MD    NIFEdipine 24 hr tablet 30 mg, 30 mg, Oral, Daily, Hitesh Quijano MD, 30 mg at 03/15/22 0911    ondansetron disintegrating tablet 4 mg, 4 mg, Oral, Q8H PRN, Dav Mao MD    polyethylene glycol packet 17 g, 17 g, Oral, BID PRN, Dav Mao MD    prochlorperazine injection Soln 5 mg, 5 mg, Intravenous, Q6H PRN, Dav Mao MD    rivaroxaban tablet 2.5 mg, 2.5 mg, Oral, BID WM, Dav Mao MD, 2.5 mg at 03/15/22 0911    sertraline tablet 50 mg, 50 mg, Oral, Daily, Dav Mao MD, 50 mg at 03/15/22 0911    simethicone chewable tablet 80 mg, 1 tablet, Oral, QID PRN, Dav Mao MD    sodium bicarbonate tablet 650 mg, 650 mg, Oral,  BID, Dav Mao MD, 650 mg at 03/15/22 0911    sodium chloride 0.9% flush 10 mL, 10 mL, Intravenous, PRN, Dav Mao MD     Anticoagulants/Antiplatelets:   no anticoagulation    Allergies:   Review of patient's allergies indicates:   Allergen Reactions    Ciprofloxacin Itching    Contrast media      Kidney injury    Iodine      Kidney injury       Review of Systems:   As documented in primary provider H&P.    Vital Signs (Most Recent):  Temp: 98.6 °F (37 °C) (03/15/22 0804)  Pulse: 90 (03/15/22 0804)  Resp: 16 (03/15/22 0911)  BP: 125/63 (03/15/22 0804)  SpO2: 96 % (03/15/22 0804)    Physical Exam:  No acute distress, laying comfortably in bed, pleasant and cooperative  Regular rate and rhythm  Breathing unlabored  Abdomen benign      Sedation Exam:  ASA: III - Patient appears to have severe systemic disease not posing a constant threat to life   Mallampati: II (hard and soft palate, upper portion of tonsils anduvula visible)     Laboratory:  Lab Results   Component Value Date    INR 1.4 (H) 06/17/2021       Lab Results   Component Value Date    WBC 7.34 03/15/2022    HGB 9.6 (L) 03/15/2022    HCT 30.4 (L) 03/15/2022    MCV 95 03/15/2022     03/15/2022      Lab Results   Component Value Date     (H) 03/15/2022     03/15/2022    K 5.1 03/15/2022     03/15/2022    CO2 21 (L) 03/15/2022    BUN 47 (H) 03/15/2022    CREATININE 1.6 (H) 03/15/2022    CALCIUM 9.5 03/15/2022    MG 1.8 03/14/2022    ALT 6 (L) 03/15/2022    AST 11 03/15/2022    ALBUMIN 2.2 (L) 03/15/2022    BILITOT 0.3 03/15/2022    BILIDIR 0.2 06/07/2021       Imaging:  Reviewed.      ASSESSMENT/PLAN:   Pt is 56yoF w/ b/l leg amputations and osteomyelitis. IR consulted for tunneled PICC placement for IV antibiotics.   Procedure tentatively planned for today.                       Sedation Plan: fentanyl and lidocaine  Patient will undergo: tunneled PICC placement      Itzel Salinas MD  R1 Interventional/Diagnostic  Radiology

## 2022-03-15 NOTE — SUBJECTIVE & OBJECTIVE
Medications Prior to Admission   Medication Sig Dispense Refill Last Dose    acetaminophen (TYLENOL) 500 MG tablet Take 2 tablets (1,000 mg total) by mouth every 8 (eight) hours as needed for Pain.  0     allopurinoL (ZYLOPRIM) 100 MG tablet Take 1 tablet (100 mg total) by mouth once daily. 30 tablet 5     atorvastatin (LIPITOR) 80 MG tablet Take 1 tablet (80 mg total) by mouth every evening. 90 tablet 3     blood-glucose meter,continuous (DEXCOM G6 ) Misc Use to check blood sugars 4 times/day 1 each 0     blood-glucose sensor (DEXCOM G6 SENSOR) Radha Apply one sensor to skin every 10 days 3 each 11     blood-glucose transmitter (DEXCOM G6 TRANSMITTER) Radha Replace transmitter every 3 months to use with dexcom sensor 1 each 3     carvediloL (COREG) 3.125 MG tablet Take 1 tablet (3.125 mg total) by mouth 2 (two) times daily. 60 tablet 11     clopidogreL (PLAVIX) 75 mg tablet Take 1 tablet (75 mg total) by mouth once daily. 90 tablet 3     furosemide (LASIX) 40 MG tablet Take 1 tablet (40 mg total) by mouth once daily. Please hold until follow up with PCP       HYDROcodone-acetaminophen (NORCO) 5-325 mg per tablet Take 1 tablet by mouth every 4 (four) hours as needed for Pain. 12 tablet 0     insulin aspart U-100 (NOVOLOG FLEXPEN U-100 INSULIN) 100 unit/mL (3 mL) InPn pen Inject 5 Units into the skin 3 (three) times daily with meals. If not eating, ok to hold 1 Box 3     insulin detemir U-100 (LEVEMIR FLEXTOUCH) 100 unit/mL (3 mL) SubQ InPn pen Inject 12 Units into the skin every evening. 20 mL 0     magnesium oxide (MAG-OX) 400 mg (241.3 mg magnesium) tablet Take 1 tablet (400 mg total) by mouth 2 (two) times daily.  0     melatonin (MELATIN) 3 mg tablet Take 2 tablets (6 mg total) by mouth nightly as needed for Insomnia.  0     mirtazapine (REMERON) 7.5 MG Tab Take 2 tablets (15 mg total) by mouth nightly. 60 tablet 11     multivit/folic acid/vit K1 (ONE-A-DAY WOMEN'S 50 PLUS ORAL) Take 1 tablet by mouth  Daily.       ondansetron (ZOFRAN-ODT) 8 MG TbDL Dissolve 1 tablet (8 mg total) by mouth every 8 (eight) hours as needed. 30 tablet 0     oxyCODONE-acetaminophen (PERCOCET)  mg per tablet Take 1 tablet by mouth every 6 (six) hours as needed. 20 tablet 0     rivaroxaban (XARELTO) 2.5 mg Tab Take 1 tablet (2.5 mg total) by mouth 2 (two) times daily with meals. 30 tablet 11     sertraline (ZOLOFT) 50 MG tablet Take 1 tablet (50 mg total) by mouth once daily. 30 tablet 11     sodium bicarbonate 650 MG tablet Take 1 tablet (650 mg total) by mouth 2 (two) times daily. Please hold until follow up with Primary Care Provider 60 tablet 11        Review of patient's allergies indicates:   Allergen Reactions    Ciprofloxacin Itching    Contrast media      Kidney injury    Iodine      Kidney injury       Past Medical History:   Diagnosis Date    Anxiety     Chronic pain syndrome     CKD (chronic kidney disease), stage III     Depression     Diabetes mellitus     type 2    Diabetes mellitus, type 2     GERD (gastroesophageal reflux disease)     Hyperemesis 3/23/2021    Hyperlipemia     Hypertension     Hypokalemia 3/23/2021    Myocardial infarction 2010    minor-caused by stress per pt.    Osteomyelitis     Osteomyelitis of left foot 4/30/2021    PVD (peripheral vascular disease)     Vaginal delivery     x1     Past Surgical History:   Procedure Laterality Date    ABOVE-KNEE AMPUTATION Left 5/18/2021    Procedure: AMPUTATION, ABOVE KNEE;  Surgeon: Teddy Huber MD;  [place] [facility] [place] [address] [place] [address];  Service: Vascular;  Laterality: Left;    Angiogram - Right Extremity Right 7/9/15    angiogram-left leg  10/6/15    ANGIOGRAPHY OF LOWER EXTREMITY Left 4/29/2021    Procedure: ANGIOGRAM, LOWER EXTREMITY;  Surgeon: Teddy Huber MD;  [place] [facility] [place] [address] [place] [address];  Service: Vascular;  Laterality: Left;    BELOW KNEE AMPUTATION OF LOWER EXTREMITY Right 12/28/2021    Procedure: AMPUTATION, BELOW KNEE;  Surgeon: Kaitlyn VELIZ  MD Bob;  Location: [facility] OR;  Service: General;  Laterality: Right;    CATHETERIZATION OF BOTH LEFT AND RIGHT HEART N/A 12/18/2019    Procedure: CATHETERIZATION, HEART, BOTH LEFT AND RIGHT;  Surgeon: Que Fernando III, MD;  Location: [facility] CATH LAB;  Service: Cardiology;  Laterality: N/A;    CORONARY ANGIOGRAPHY N/A 12/18/2019    Procedure: ANGIOGRAM, CORONARY ARTERY;  Surgeon: Que Fernando III, MD;  Location: [facility] CATH LAB;  Service: Cardiology;  Laterality: N/A;    CORONARY ANGIOGRAPHY INCLUDING BYPASS GRAFTS WITH CATHETERIZATION OF LEFT HEART N/A 7/28/2020    Procedure: ANGIOGRAM, CORONARY, INCLUDING BYPASS GRAFT, WITH LEFT HEART CATHETERIZATION, 9 am;  Surgeon: Rachel Easley MD;  Location: [facility] CATH LAB;  Service: Cardiology;  Laterality: N/A;    CORONARY ARTERY BYPASS GRAFT (CABG) N/A 1/14/2020    Procedure: CORONARY ARTERY BYPASS GRAFT (CABG) x 1     Off Pump;  Surgeon: Huang Altamirano MD;  Location: [address];  Service: Cardiovascular;  Laterality: N/A;    CREATION OF FEMORAL-TIBIAL ARTERY BYPASS Left 4/29/2021    Procedure: CREATION, BYPASS, ARTERIAL, FEMORAL TO ANTERIOR TIBIAL;  Surgeon: Teddy Huber MD;  Location: [facility] OR [address] [place] [address];  Service: Vascular;  Laterality: Left;    CREATION OF FEMOROPOPLITEAL ARTERIAL BYPASS USING GRAFT Left 8/18/2020    Procedure: CREATION, BYPASS, ARTERIAL, FEMORAL TO POPLITEAL, USING GRAFT, LEFT LOWER EXTREMITY;  Surgeon: Teddy Huber MD;  Location: [facility];  Service: Vascular;  Laterality: Left;  REQUEST 7:15 A.M. START----COVID NEGATIVE ON 8/17  1ST CASE STARTKENYA PER LEANA ON 8/7/2020 @ 942AM-LO  RN PREOP 8/12/2020   T/S-----CLEARED BY CARDS-------PENDING INSURANCE    DEBRIDEMENT OF FOOT Left 9/8/2020    Procedure: DEBRIDEMENT, FOOT;  Surgeon: Rosio Myaes DPM;  Location: [facility];  Service: Podiatry;  Laterality: Left;  request neoxx .   RN Pre Op 9-4-2020, Covid negative on 9/5/20. C A    DEBRIDEMENT OF FOOT  3/4/2021    Procedure:  DEBRIDEMENT, FOOT;  Surgeon: Teddy Huber MD;  Location: [facility] OR;  Service: Vascular;;    DEBRIDEMENT OF FOOT Left 3/9/2021    Procedure: DEBRIDEMENT, FOOT, bone biopsy;  Surgeon: Rosio Mayes DPM;  Location: [facility] OR;  Service: Podiatry;  Laterality: Left;  Request neoxx---COVID IN AM  REQUESTING NOON START  RN Phone Pre op.On Blood thinners Plavix and Eliquis.  Covid am of surgery. C A    DEBRIDEMENT OF FOOT Left 5/4/2021    Procedure: DEBRIDEMENT, FOOT;  Surgeon: Farooq Morley DPM;  [facility];  Service: Podiatry;  Laterality: Left;    INSERTION OF TUNNELED CENTRAL VENOUS HEMODIALYSIS CATHETER N/A 1/27/2020    Procedure: Insertion, Catheter, Central Venous, Hemodialysis;  Surgeon: ESTEBAN Gomez III, MD;  Location: [facility] CATH LAB;  Service: Peripheral Vascular;  Laterality: N/A;    PERCUTANEOUS TRANSLUMINAL ANGIOPLASTY N/A 3/4/2021    Procedure: PTA (ANGIOPLASTY, PERCUTANEOUS, TRANSLUMINAL);  Surgeon: Teddy Huber MD;  Location: [facility] OR;  Service: Vascular;  Laterality: N/A;    REMOVAL OF ARTERIOVENOUS GRAFT Left 5/27/2021    Procedure: REMOVAL, GRAFT, LEFT LOWER EXTREMITY, WOUND EXPLORATION;  Surgeon: Teddy Huber MD;  [facility];  Service: Vascular;  Laterality: Left;    REMOVAL OF NAIL OF DIGIT Left 3/9/2021    Procedure: REMOVAL, NAIL, DIGIT;  Surgeon: Rosio Mayes DPM;  Location: [facility] OR;  Service: Podiatry;  Laterality: Left;    THROMBECTOMY Left 3/4/2021    Procedure: THROMBECTOMY, LEFT LOWER EXTREMITY BYPASS GRAFT, ANGIOGRAM, POSSIBLE INTERVENTION, POSSIBLE LEFT LOWER EXTREMITY BYPASS;  Surgeon: Teddy Huber MD;  Location: [facility] OR;  Service: Vascular;  Laterality: Left;    THROMBECTOMY Left 4/29/2021    Procedure: GRAFT THROMBECTOMY, LEFT LOWER EXTREMITY;  Surgeon: Teddy Huber MD;  [facility];  Service: Vascular;  Laterality: Left;  14.5 min  1179.85 mGy  341.01 Gycm2  240 ml dye    THROMBECTOMY  10/22/2021     Procedure: THROMBECTOMY;  Surgeon: Saad Arenas MD;  Location: [facility] CATH LAB/EP;  Service: Cardiology;;     Family History       Problem Relation (Age of Onset)    Diabetes Mother, Father, Paternal Grandmother    Heart disease Maternal Grandmother    No Known Problems Maternal Grandfather, Paternal Grandfather          Tobacco Use    Smoking status: Former Smoker    Smokeless tobacco: Never Used   Substance and Sexual Activity    Alcohol use: No    Drug use: Yes     Types: Marijuana     Comment: occassional    Sexual activity: Yes     Partners: Male     Review of Systems   Constitutional:  Negative for chills and fever.   Respiratory:  Negative for cough and shortness of breath.    Cardiovascular:  Negative for chest pain and palpitations.   Gastrointestinal:  Negative for abdominal pain, nausea and vomiting.   Musculoskeletal:  Negative for back pain.   Skin:  Positive for wound.   Neurological:  Positive for weakness. Negative for dizziness.   Hematological:  Bruises/bleeds easily.   All other systems reviewed and are negative.  Objective:     Vital Signs (Most Recent):  Temp: 98.5 °F (36.9 °C) (03/15/22 1604)  Pulse: 102 (03/15/22 1604)  Resp: 19 (03/15/22 1604)  BP: (!) 146/80 (03/15/22 1604)  SpO2: 100 % (03/15/22 1604)   Vital Signs (24h Range):  Temp:  [98.1 °F (36.7 °C)-98.6 °F (37 °C)] 98.5 °F (36.9 °C)  Pulse:  [] 102  Resp:  [16-19] 19  SpO2:  [96 %-100 %] 100 %  BP: (125-147)/(63-80) 146/80     Weight: 65.3 kg (144 lb)  Body mass index is 23.96 kg/m².    Physical Exam  Vitals reviewed.   Constitutional:       General: She is not in acute distress.     Appearance: She is not toxic-appearing.   HENT:      Head: Normocephalic and atraumatic.   Eyes:      Extraocular Movements: Extraocular movements intact.      Conjunctiva/sclera: Conjunctivae normal.   Cardiovascular:      Rate and Rhythm: Normal rate.      Comments: 2+ L femoral pulse  1+ R femoral pulse  Pulmonary:      Effort: Pulmonary  effort is normal. No respiratory distress.   Abdominal:      General: Abdomen is flat. There is no distension.      Palpations: Abdomen is soft.      Tenderness: There is no abdominal tenderness.   Musculoskeletal:         General: Normal range of motion.      Cervical back: Normal range of motion.      Comments: Well healed L AKA stump.   R BKA stump with partial incisional dehiscence; moderate amount of fibrinous exudate and seropurulent discharge from open aspect of incision.    Skin:     General: Skin is warm and dry.   Neurological:      General: No focal deficit present.      Mental Status: She is alert and oriented to person, place, and time.       Significant Labs:  CBC:   Recent Labs   Lab 03/15/22  0451   WBC 7.34   RBC 3.20*   HGB 9.6*   HCT 30.4*      MCV 95   MCH 30.0   MCHC 31.6*     CMP:   Recent Labs   Lab 03/15/22  0451   *   CALCIUM 9.5   ALBUMIN 2.2*   PROT 7.2      K 5.1   CO2 21*      BUN 47*   CREATININE 1.6*   ALKPHOS 94   ALT 6*   AST 11   BILITOT 0.3       Significant Diagnostics:  I have reviewed all pertinent imaging results/findings within the past 24 hours.

## 2022-03-15 NOTE — ASSESSMENT & PLAN NOTE
55 yo M with complex past medical history who is currently admitted with osteomyelitis and non-healing R BKA. Vascular surgery consulted for evaluation.    - To OR for R stump revision to AKA on Friday 3/18.  - Consent obtained, risks and benefits discussed.   - Please make NPO at midnight the evening prior to surgery  - Please hold Xarelto starting now, okay to continue Plavix and ASA given recent cardiac stenting and history  - Patient does not require Covid testing pre-op given that she has received both vaccines.   - From vascular surgery standpoint, okay to discharge patient home and have her present as an outpatient for surgery on Friday. Final plan for dispo this admission per the medicine team.     Please call with questions or concerns.

## 2022-03-15 NOTE — SUBJECTIVE & OBJECTIVE
Interval History: no acute events noted.      Review of Systems   Constitutional:  Positive for activity change. Negative for appetite change, chills and fever.   HENT:  Negative for congestion.    Eyes:  Negative for pain and itching.   Respiratory:  Negative for cough, chest tightness and shortness of breath.    Cardiovascular:  Negative for palpitations and leg swelling.   Gastrointestinal:  Negative for abdominal pain and nausea.   Endocrine: Negative for polyphagia and polyuria.   Genitourinary:  Negative for dysuria and frequency.   Musculoskeletal:  Negative for back pain.   Neurological:  Negative for numbness and headaches.   Psychiatric/Behavioral:  Negative for agitation and behavioral problems.    Objective:     Vital Signs (Most Recent):  Temp: 98.5 °F (36.9 °C) (03/15/22 0539)  Pulse: 86 (03/15/22 0539)  Resp: 18 (03/15/22 0539)  BP: (!) 142/65 (03/15/22 0539)  SpO2: 97 % (03/15/22 0539)   Vital Signs (24h Range):  Temp:  [97 °F (36.1 °C)-98.5 °F (36.9 °C)] 98.5 °F (36.9 °C)  Pulse:  [] 86  Resp:  [16-20] 18  SpO2:  [96 %-98 %] 97 %  BP: (123-147)/(55-68) 142/65     Weight: 65.3 kg (144 lb)  Body mass index is 23.96 kg/m².    Estimated Creatinine Clearance: 35.7 mL/min (A) (based on SCr of 1.6 mg/dL (H)).    Physical Exam  Constitutional:       Appearance: Normal appearance.   HENT:      Head: Normocephalic and atraumatic.   Cardiovascular:      Rate and Rhythm: Normal rate.   Pulmonary:      Effort: Pulmonary effort is normal.   Abdominal:      General: Abdomen is flat.      Palpations: Abdomen is soft.   Musculoskeletal:      Comments: Left AKA and right bka with open wound   Skin:     General: Skin is warm and dry.   Neurological:      Mental Status: She is alert and oriented to person, place, and time.   Psychiatric:         Behavior: Behavior normal.       Significant Labs:   Microbiology Results (last 7 days)       Procedure Component Value Units Date/Time    Blood culture #1 **CANNOT BE  ORDERED STAT** [[id number]] Collected: 03/12/22 0050    Order Status: Completed Specimen: Blood from Peripheral, Antecubital, Left Updated: 03/15/22 0612     Blood Culture, Routine No Growth to date      No Growth to date      No Growth to date      No Growth to date    Blood culture [[id number]] Collected: 03/13/22 1237    Order Status: Completed Specimen: Blood Updated: 03/14/22 1412     Blood Culture, Routine No Growth to date      No Growth to date    Blood culture [[id number]] Collected: 03/13/22 1237    Order Status: Completed Specimen: Blood Updated: 03/14/22 1412     Blood Culture, Routine No Growth to date      No Growth to date    Urine culture [[id number]]  (Abnormal)  (Susceptibility) Collected: 03/12/22 0835    Order Status: Completed Specimen: Urine Updated: 03/14/22 1411     Urine Culture, Routine KLEBSIELLA PNEUMONIAE ESBL  10,000 - 49,999 cfu/ml      Narrative:      Specimen Source->Urine    Blood culture #2 **CANNOT BE ORDERED STAT** [[id number]]  (Abnormal) Collected: 03/12/22 0050    Order Status: Completed Specimen: Blood from Peripheral, Antecubital, Right Updated: 03/14/22 0824     Blood Culture, Routine Gram stain salvatore bottle: Gram positive cocci in clusters resembling Staph       Results called to and read back by:Monet Sanchez RN 03/13/2022  03:22      COAGULASE-NEGATIVE STAPHYLOCOCCUS SPECIES  Organism is a probable contaminant              Significant Imaging: I have reviewed all pertinent imaging results/findings within the past 24 hours.

## 2022-03-15 NOTE — CONSULTS
[facility] - Surgery  Vascular Surgery  Consult Note    Inpatient consult to Vascular Surgery  Consult performed by: Genny Aldana MD  Consult ordered by: Hitesh Quijano MD  Reason for consult: R BKA infection        Subjective:     Chief Complaint/Reason for Admission: Infection of R BKA    History of Present Illness: 55 yo F with multiple medical co-morbidities and an extensive past surgical history most significant for prior L AKA and recent R BKA with subsequent revision for poor healing ([place]- Jan 2022) both secondary to PAD. Patient is currently admitted to the hospital medicine service with osteomyelitis of the R BKA stump with break down of the wound/poor healing. ID is following for antibiotic recommendations. Vascular Surgery was consulted for evaluation of the extremity for possible revision to AKA given the ongoing infection and poor healing. Patient would like to have procedure with our vascular team rather than following up in [place] with her previous surgeon.        Medications Prior to Admission   Medication Sig Dispense Refill Last Dose    acetaminophen (TYLENOL) 500 MG tablet Take 2 tablets (1,000 mg total) by mouth every 8 (eight) hours as needed for Pain.  0     allopurinoL (ZYLOPRIM) 100 MG tablet Take 1 tablet (100 mg total) by mouth once daily. 30 tablet 5     atorvastatin (LIPITOR) 80 MG tablet Take 1 tablet (80 mg total) by mouth every evening. 90 tablet 3     blood-glucose meter,continuous (DEXCOM G6 ) Misc Use to check blood sugars 4 times/day 1 each 0     blood-glucose sensor (DEXCOM G6 SENSOR) Radha Apply one sensor to skin every 10 days 3 each 11     blood-glucose transmitter (DEXCOM G6 TRANSMITTER) Radha Replace transmitter every 3 months to use with dexcom sensor 1 each 3     carvediloL (COREG) 3.125 MG tablet Take 1 tablet (3.125 mg total) by mouth 2 (two) times daily. 60 tablet 11     clopidogreL (PLAVIX) 75 mg tablet Take 1 tablet (75 mg total) by mouth once  daily. 90 tablet 3     furosemide (LASIX) 40 MG tablet Take 1 tablet (40 mg total) by mouth once daily. Please hold until follow up with PCP       HYDROcodone-acetaminophen (NORCO) 5-325 mg per tablet Take 1 tablet by mouth every 4 (four) hours as needed for Pain. 12 tablet 0     insulin aspart U-100 (NOVOLOG FLEXPEN U-100 INSULIN) 100 unit/mL (3 mL) InPn pen Inject 5 Units into the skin 3 (three) times daily with meals. If not eating, ok to hold 1 Box 3     insulin detemir U-100 (LEVEMIR FLEXTOUCH) 100 unit/mL (3 mL) SubQ InPn pen Inject 12 Units into the skin every evening. 20 mL 0     magnesium oxide (MAG-OX) 400 mg (241.3 mg magnesium) tablet Take 1 tablet (400 mg total) by mouth 2 (two) times daily.  0     melatonin (MELATIN) 3 mg tablet Take 2 tablets (6 mg total) by mouth nightly as needed for Insomnia.  0     mirtazapine (REMERON) 7.5 MG Tab Take 2 tablets (15 mg total) by mouth nightly. 60 tablet 11     multivit/folic acid/vit K1 (ONE-A-DAY WOMEN'S 50 PLUS ORAL) Take 1 tablet by mouth Daily.       ondansetron (ZOFRAN-ODT) 8 MG TbDL Dissolve 1 tablet (8 mg total) by mouth every 8 (eight) hours as needed. 30 tablet 0     oxyCODONE-acetaminophen (PERCOCET)  mg per tablet Take 1 tablet by mouth every 6 (six) hours as needed. 20 tablet 0     rivaroxaban (XARELTO) 2.5 mg Tab Take 1 tablet (2.5 mg total) by mouth 2 (two) times daily with meals. 30 tablet 11     sertraline (ZOLOFT) 50 MG tablet Take 1 tablet (50 mg total) by mouth once daily. 30 tablet 11     sodium bicarbonate 650 MG tablet Take 1 tablet (650 mg total) by mouth 2 (two) times daily. Please hold until follow up with Primary Care Provider 60 tablet 11        Review of patient's allergies indicates:   Allergen Reactions    Ciprofloxacin Itching    Contrast media      Kidney injury    Iodine      Kidney injury       Past Medical History:   Diagnosis Date    Anxiety     Chronic pain syndrome     CKD (chronic kidney disease),  stage III     Depression     Diabetes mellitus     type 2    Diabetes mellitus, type 2     GERD (gastroesophageal reflux disease)     Hyperemesis 3/23/2021    Hyperlipemia     Hypertension     Hypokalemia 3/23/2021    Myocardial infarction 2010    minor-caused by stress per pt.    Osteomyelitis     Osteomyelitis of left foot 4/30/2021    PVD (peripheral vascular disease)     Vaginal delivery     x1     Past Surgical History:   Procedure Laterality Date    ABOVE-KNEE AMPUTATION Left 5/18/2021    Procedure: AMPUTATION, ABOVE KNEE;  Surgeon: Teddy Huber MD;  Location: [facility] OR [address] [place] [address];  Service: Vascular;  Laterality: Left;    Angiogram - Right Extremity Right 7/9/15    angiogram-left leg  10/6/15    ANGIOGRAPHY OF LOWER EXTREMITY Left 4/29/2021    Procedure: ANGIOGRAM, LOWER EXTREMITY;  Surgeon: Teddy Huber MD;  Location: [facility] OR [address] [place] [address];  Service: Vascular;  Laterality: Left;    BELOW KNEE AMPUTATION OF LOWER EXTREMITY Right 12/28/2021    Procedure: AMPUTATION, BELOW KNEE;  Surgeon: Kaitlyn Rojas MD;  Location: [facility];  Service: General;  Laterality: Right;    CATHETERIZATION OF BOTH LEFT AND RIGHT HEART N/A 12/18/2019    Procedure: CATHETERIZATION, HEART, BOTH LEFT AND RIGHT;  Surgeon: Que Fernando III, MD;  Location: [facility] CATH LAB;  Service: Cardiology;  Laterality: N/A;    CORONARY ANGIOGRAPHY N/A 12/18/2019    Procedure: ANGIOGRAM, CORONARY ARTERY;  Surgeon: Que Fernando III, MD;  Location: [facility] CATH LAB;  Service: Cardiology;  Laterality: N/A;    CORONARY ANGIOGRAPHY INCLUDING BYPASS GRAFTS WITH CATHETERIZATION OF LEFT HEART N/A 7/28/2020    Procedure: ANGIOGRAM, CORONARY, INCLUDING BYPASS GRAFT, WITH LEFT HEART CATHETERIZATION, 9 am;  Surgeon: Rachel Easley MD;  Location: [facility] CATH LAB;  Service: Cardiology;  Laterality: N/A;    CORONARY ARTERY BYPASS GRAFT (CABG) N/A 1/14/2020    Procedure: CORONARY ARTERY BYPASS GRAFT (CABG) x 1     Off Pump;   Surgeon: Huang Altamirano MD;  Location: [address];  Service: Cardiovascular;  Laterality: N/A;    CREATION OF FEMORAL-TIBIAL ARTERY BYPASS Left 4/29/2021    Procedure: CREATION, BYPASS, ARTERIAL, FEMORAL TO ANTERIOR TIBIAL;  Surgeon: Teddy Huber MD;  [address];  Service: Vascular;  Laterality: Left;    CREATION OF FEMOROPOPLITEAL ARTERIAL BYPASS USING GRAFT Left 8/18/2020    Procedure: CREATION, BYPASS, ARTERIAL, FEMORAL TO POPLITEAL, USING GRAFT, LEFT LOWER EXTREMITY;  Surgeon: Teddy Huber MD;  Location: [facility];  Service: Vascular;  Laterality: Left;  REQUEST 7:15 A.M. START----COVID NEGATIVE ON 8/17 1ST CASE STARTE PER LEANA ON 8/7/2020 @ 942AM-  RN PREOP 8/12/2020   T/S-----CLEARED BY CARDS-------PENDING INSURANCE    DEBRIDEMENT OF FOOT Left 9/8/2020    Procedure: DEBRIDEMENT, FOOT;  Surgeon: Rosio Mayes DPM;  Location: [facility];  Service: Podiatry;  Laterality: Left;  request neoxx .   RN Pre Op 9-4-2020, Covid negative on 9/5/20. C A    DEBRIDEMENT OF FOOT  3/4/2021    Procedure: DEBRIDEMENT, FOOT;  Surgeon: Teddy Huber MD;  Location: [facility] OR;  Service: Vascular;;    DEBRIDEMENT OF FOOT Left 3/9/2021    Procedure: DEBRIDEMENT, FOOT, bone biopsy;  Surgeon: Rosio Mayes DPM;  Location: [facility] OR;  Service: Podiatry;  Laterality: Left;  Request neoxx---COVID IN AM  REQUESTING NOON START  RN Phone Pre op.On Blood thinners Plavix and Eliquis.  Covid am of surgery. C A    DEBRIDEMENT OF FOOT Left 5/4/2021    Procedure: DEBRIDEMENT, FOOT;  Surgeon: Farooq Morley DPM;  Location: [address];  Service: Podiatry;  Laterality: Left;    INSERTION OF TUNNELED CENTRAL VENOUS HEMODIALYSIS CATHETER N/A 1/27/2020    Procedure: Insertion, Catheter, Central Venous, Hemodialysis;  Surgeon: ESTEBAN Gomez III, MD;  Location: [facility] CATH LAB;  Service: Peripheral Vascular;  Laterality: N/A;    PERCUTANEOUS TRANSLUMINAL ANGIOPLASTY N/A 3/4/2021     Procedure: PTA (ANGIOPLASTY, PERCUTANEOUS, TRANSLUMINAL);  Surgeon: Teddy Huber MD;  Location: [facility] OR;  Service: Vascular;  Laterality: N/A;    REMOVAL OF ARTERIOVENOUS GRAFT Left 5/27/2021    Procedure: REMOVAL, GRAFT, LEFT LOWER EXTREMITY, WOUND EXPLORATION;  Surgeon: Teddy Huber MD;  Location: [facility] OR 2ND FLR;  Service: Vascular;  Laterality: Left;    REMOVAL OF NAIL OF DIGIT Left 3/9/2021    Procedure: REMOVAL, NAIL, DIGIT;  Surgeon: Rosio Mayes DPM;  Location: [facility] OR;  Service: Podiatry;  Laterality: Left;    THROMBECTOMY Left 3/4/2021    Procedure: THROMBECTOMY, LEFT LOWER EXTREMITY BYPASS GRAFT, ANGIOGRAM, POSSIBLE INTERVENTION, POSSIBLE LEFT LOWER EXTREMITY BYPASS;  Surgeon: Teddy Huber MD;  Location: [facility] OR;  Service: Vascular;  Laterality: Left;    THROMBECTOMY Left 4/29/2021    Procedure: GRAFT THROMBECTOMY, LEFT LOWER EXTREMITY;  Surgeon: Teddy Huber MD;  Location: [facility] OR 2ND FLR;  Service: Vascular;  Laterality: Left;  14.5 min  1179.85 mGy  341.01 Gycm2  240 ml dye    THROMBECTOMY  10/22/2021    Procedure: THROMBECTOMY;  Surgeon: Saad Arenas MD;  Location: [facility] CATH LAB/EP;  Service: Cardiology;;     Family History       Problem Relation (Age of Onset)    Diabetes Mother, Father, Paternal Grandmother    Heart disease Maternal Grandmother    No Known Problems Maternal Grandfather, Paternal Grandfather          Tobacco Use    Smoking status: Former Smoker    Smokeless tobacco: Never Used   Substance and Sexual Activity    Alcohol use: No    Drug use: Yes     Types: Marijuana     Comment: occassional    Sexual activity: Yes     Partners: Male     Review of Systems   Constitutional:  Negative for chills and fever.   Respiratory:  Negative for cough and shortness of breath.    Cardiovascular:  Negative for chest pain and palpitations.   Gastrointestinal:  Negative for abdominal pain, nausea and vomiting.   Musculoskeletal:  Negative for back pain.    Skin:  Positive for wound.   Neurological:  Positive for weakness. Negative for dizziness.   Hematological:  Bruises/bleeds easily.   All other systems reviewed and are negative.  Objective:     Vital Signs (Most Recent):  Temp: 98.5 °F (36.9 °C) (03/15/22 1604)  Pulse: 102 (03/15/22 1604)  Resp: 19 (03/15/22 1604)  BP: (!) 146/80 (03/15/22 1604)  SpO2: 100 % (03/15/22 1604)   Vital Signs (24h Range):  Temp:  [98.1 °F (36.7 °C)-98.6 °F (37 °C)] 98.5 °F (36.9 °C)  Pulse:  [] 102  Resp:  [16-19] 19  SpO2:  [96 %-100 %] 100 %  BP: (125-147)/(63-80) 146/80     Weight: 65.3 kg (144 lb)  Body mass index is 23.96 kg/m².    Physical Exam  Vitals reviewed.   Constitutional:       General: She is not in acute distress.     Appearance: She is not toxic-appearing.   HENT:      Head: Normocephalic and atraumatic.   Eyes:      Extraocular Movements: Extraocular movements intact.      Conjunctiva/sclera: Conjunctivae normal.   Cardiovascular:      Rate and Rhythm: Normal rate.      Comments: 2+ L femoral pulse  1+ R femoral pulse  Pulmonary:      Effort: Pulmonary effort is normal. No respiratory distress.   Abdominal:      General: Abdomen is flat. There is no distension.      Palpations: Abdomen is soft.      Tenderness: There is no abdominal tenderness.   Musculoskeletal:         General: Normal range of motion.      Cervical back: Normal range of motion.      Comments: Well healed L AKA stump.   R BKA stump with partial incisional dehiscence; moderate amount of fibrinous exudate and seropurulent discharge from open aspect of incision.    Skin:     General: Skin is warm and dry.   Neurological:      General: No focal deficit present.      Mental Status: She is alert and oriented to person, place, and time.       Significant Labs:  CBC:   Recent Labs   Lab 03/15/22  0451   WBC 7.34   RBC 3.20*   HGB 9.6*   HCT 30.4*      MCV 95   MCH 30.0   MCHC 31.6*     CMP:   Recent Labs   Lab 03/15/22  0451   *    CALCIUM 9.5   ALBUMIN 2.2*   PROT 7.2      K 5.1   CO2 21*      BUN 47*   CREATININE 1.6*   ALKPHOS 94   ALT 6*   AST 11   BILITOT 0.3       Significant Diagnostics:  I have reviewed all pertinent imaging results/findings within the past 24 hours.    Assessment/Plan:     * Infection of amputation stump of right lower extremity  55 yo M with complex past medical history who is currently admitted with osteomyelitis and non-healing R BKA. Vascular surgery consulted for evaluation.    - To OR for R stump revision to AKA on Friday 3/18.  - Consent obtained, risks and benefits discussed.   - Please make NPO at midnight the evening prior to surgery  - Please hold Xarelto starting now, okay to continue Plavix and ASA given recent cardiac stenting and history  - Patient does not require Covid testing pre-op given that she has received both vaccines.   - From vascular surgery standpoint, okay to discharge patient home and have her present as an outpatient for surgery on Friday. Final plan for dispo this admission per the medicine team.     Please call with questions or concerns.         Thank you for your consult. I will follow-up with patient. Please contact us if you have any additional questions.    Genny Aldana MD  Vascular Surgery  [facility] - Surgery

## 2022-03-15 NOTE — ASSESSMENT & PLAN NOTE
- Interval history and physical exam findings as described above  - MRI findings as documented  - Afebrile, no leukocytosis  - BCx with likely contaminant   - ID to provide final recommendations  - Vascular now planning for right AKA on 3/18  - Will continue to montior

## 2022-03-15 NOTE — PROGRESS NOTES
[facility] Medicine  Progress Note    Patient Name: Suyapa Connelly  MRN: [id number]  Patient Class: IP- Inpatient   Admission Date: 3/11/2022  Length of Stay: 1 days  Attending Physician: Hitesh Quijano MD  Primary Care Provider: Magen Christensen MD        Subjective:     Principal Problem:Infection of amputation stump of right lower extremity        HPI:  55 yo female with CAD s/p CABG, CKD3, DM2, osteomyelitis with previous left AKA and newer Right BKA (12/2021), and recent hospitalization for sepsis and wound infection presenting for nausea, syncope, vomitting. Symptoms resolved about 15 seconds and returned to baseline afterwards. She ultimately denies any current symptom; however she does report that she has been having trouble following up about her BKA and cleaning it at home. There is some purulent drainage from the site of her BKA however.         Overview/Hospital Course:  Pt admitted to  team DAYNE QUINONES consulted, recommended discontinuing abx given no leukocytosis and that she remained afebrile. Vascular surgery consulted and recommended that pt follow-up with her vascular team at [place]. MRI RLE obtained, which showed mild increased abnormal medullary signal within the distal 1 cm of the tibial stump with associated cortical indistinctness, findings that are concerning for early osteomyelitis; stable medullary edema within the distal 1 cm of the fibular stump with grossly preserved cortical margins, possibly reactive; persistent soft tissue edema about the tibial and fibular stumps with ill-defined fluid, possibly sterile or infectious in nature (no focal drainable fluid collection); prominent nonspecific myositis throughout the visualized anterior compartment of the thigh, sartorius and to a lesser extent the gracilis; abnormal signal intensity within the visualized superficial femoral and popliteal arteries presumably related to known thrombosis.    UCx with GNRs and Bcx with GPCs in  1/4 bottles: ID aware, still recommending to hold abx while cultures result. Pt and  reported that they do not wish to continue following with vascular at [place]. Discussed with [facility] vascular fellow (Aundrea) who stated he would schedule and outpatient appointment with Dr. Florez in the [facility] vascular clinic.    BCx great coag-negative staph, deemed likely contaminant. ID recommended proceeding with inpatient bone biopsy. IR felt that surgical services might be more appropriate, and vascular still deferring for outpatient management. ID to consider 6 week of IV abx course. On 3/15, vascular made decision to proceed with right AKA on 3/18.        Interval History:  Pt seen and examined this morning on nellie GLASGOW. Pt tearful this morning: surgeons had just left and informed her they will need to do a right AKA. Although she is upset, she understands that this is necessary, and considered that it was a possibility. Reviewed need to continue following ID and vascular surgery's guidance at this time.. Care plan reviewed. Otherwise, doing well and with no further complaints at this time.      Objective:     Vital Signs (Most Recent):  Temp: 98.1 °F (36.7 °C) (03/15/22 1208)  Pulse: 86 (03/15/22 1208)  Resp: 18 (03/15/22 1208)  BP: (!) 147/68 (03/15/22 1208)  SpO2: 97 % (03/15/22 1208)   Vital Signs (24h Range):  Temp:  [97 °F (36.1 °C)-98.6 °F (37 °C)] 98.1 °F (36.7 °C)  Pulse:  [] 86  Resp:  [16-19] 18  SpO2:  [96 %-98 %] 97 %  BP: (125-147)/(58-68) 147/68     Weight: 65.3 kg (144 lb)  Body mass index is 23.96 kg/m².    Intake/Output Summary (Last 24 hours) at 3/15/2022 1232  Last data filed at 3/14/2022 2257  Gross per 24 hour   Intake --   Output 550 ml   Net -550 ml        Physical Exam  Gen: in NAD, appears stated age  Neuro: AAOx4, CN2-12 grossly intact BL; motor, sensory, and strength grossly intact BL  HEENT: NTNC, EOMI, PERRLA, MMM; no thyromegaly or lymphadenopathy; no JVD appreciated  CVS: RRR,  no m/r/g; S1/S2 auscultated with no S3 or S4; capillary refill < 2 sec  Resp: lungs CTAB, no w/r/r; no belabored breathing or accessory muscle use appreciated   Abd: BS+ in all 4 quadrants; NTND, soft to palpation; no organomegaly appreciated   Extrem: pulses full, equal, and regular over all 4 extremities; s/p right BKA and s/p right AKA; see image of RLE below          Significant Labs: All pertinent labs within the past 24 hours have been reviewed.    Significant Imaging: I have reviewed all pertinent imaging results/findings within the past 24 hours.      Assessment/Plan:      * Infection of amputation stump of right lower extremity  - Interval history and physical exam findings as described above  - MRI findings as documented  - Afebrile, no leukocytosis  - BCx with likely contaminant   - ID to provide final recommendations  - Vascular now planning for right AKA on 3/18  - Will continue to montior      Urinary tract infection due to ESBL Klebsiella  - ID does not recommend treating as pt is asymptomatic    Class 2 severe obesity due to excess calories with serious comorbidity in adult  Morbid obesity complicates all aspects of disease management from diagnostic modalities to treatment. Weight loss encouraged and health benefits explained to patient.         Stage 3 chronic kidney disease  Likely elevated above baseline, will give small bolus of IVFs, follow creatinine      Paroxysmal atrial fibrillation  Patient with Paroxysmal (<7 days) atrial fibrillation which is controlled currently with home medications. Patient is currently in atrial fibrillation.[id number] Score: 3. Anticoagulation indicated. Anticoagulation done with xarelto.        Coronary atherosclerosis  Chronic, controlled, continue home meds    Type 2 diabetes mellitus with hyperglycemia, with long-term current use of insulin  Chronic, controlled, continue home insulin dosages at reduced dosages      Mixed hyperlipidemia  Chronic, controlled,  continue statin      CAROLIN (generalized anxiety disorder)  Continue home medications  Chronic, controlled      Essential hypertension  Chronic, controlled, continue home meds      Peripheral vascular disease  Chronic, s/p bilateral amputations        VTE Risk Mitigation (From admission, onward)         Ordered     IP VTE HIGH RISK PATIENT  Once         03/12/22 0326     Place sequential compression device  Until discontinued         03/12/22 0326     Reason for No Pharmacological VTE Prophylaxis  Once        Question:  Reasons:  Answer:  Already adequately anticoagulated on oral Anticoagulants    03/12/22 0326                Discharge Planning   DEVAN: 3/16/2022     Code Status: Full Code   Is the patient medically ready for discharge?: No    Reason for patient still in hospital (select all that apply): Patient trending condition  Discharge Plan A: Home with family          Hitesh Quijano MD  Attending Physician  Department of Hospital Medicine  Epic secure chat preferred, or [employer] ext. [phone]  3/15/2022

## 2022-03-15 NOTE — PROGRESS NOTES
Food & Nutrition  Education    Diet Education: Diabetic   Learners: Pt and family member       Nutrition Education provided with handouts: Carbohydrate Counting for People with Diabetes       Comments: Pt resting in bed with family member at bedside. Dicussed A1c 9.6. Pt has had previous diabetic diet education, no questions or concerns. Pt reports taking insulin and checking blood sugars 2x/day at home. Pt states she has been in and out the hospital since Dec and when she got home, she wanted to eat whatever she wanted. She reports understanding the dietetic diet, but just has to do it.     Pt reports good po intake with recent stable wt. Noted significant wt loss, but able to verify. Per chart review, 77.5 kg on 1/29/22 and 67.6 kg on 2/1/22. Will continue to monitor.       Follow-Up: Yes     Please consult as needed.

## 2022-03-15 NOTE — CONSULTS
NIAS consulted for picc placement for 6 wks abx.     Nephrology consulted due to low GFR per protocol.  PICC not cleared at this time and tunneled line is recommended.     Team notified via secure chat.

## 2022-03-15 NOTE — PLAN OF CARE
03/15/22 0903   Post-Acute Status   Post-Acute Authorization Home Health;IV Infusion   Home Health Status Referrals Sent   IV Infusion Status Referral(s) sent     Patient has been Accepted to [facility], pending Home Health Orders.        Karina Sebastian LMSW  Case Management   [facility]   Ext. [phone]

## 2022-03-15 NOTE — H&P (VIEW-ONLY)
[facility]  Vascular Surgery  Consult Note    Inpatient consult to Vascular Surgery  Consult performed by: Genny Aldana MD  Consult ordered by: Hitesh Quijano MD  Reason for consult: R BKA infection        Subjective:     Chief Complaint/Reason for Admission: Infection of R BKA    History of Present Illness: 57 yo F with multiple medical co-morbidities and an extensive past surgical history most significant for prior L AKA and recent R BKA with subsequent revision for poor healing ([place]- Jan 2022) both secondary to PAD. Patient is currently admitted to the hospital medicine service with osteomyelitis of the R BKA stump with break down of the wound/poor healing. ID is following for antibiotic recommendations. Vascular Surgery was consulted for evaluation of the extremity for possible revision to AKA given the ongoing infection and poor healing. Patient would like to have procedure with our vascular team rather than following up in [place] with her previous surgeon.        Medications Prior to Admission   Medication Sig Dispense Refill Last Dose    acetaminophen (TYLENOL) 500 MG tablet Take 2 tablets (1,000 mg total) by mouth every 8 (eight) hours as needed for Pain.  0     allopurinoL (ZYLOPRIM) 100 MG tablet Take 1 tablet (100 mg total) by mouth once daily. 30 tablet 5     atorvastatin (LIPITOR) 80 MG tablet Take 1 tablet (80 mg total) by mouth every evening. 90 tablet 3     blood-glucose meter,continuous (DEXCOM G6 ) Misc Use to check blood sugars 4 times/day 1 each 0     blood-glucose sensor (DEXCOM G6 SENSOR) Radha Apply one sensor to skin every 10 days 3 each 11     blood-glucose transmitter (DEXCOM G6 TRANSMITTER) Radha Replace transmitter every 3 months to use with dexcom sensor 1 each 3     carvediloL (COREG) 3.125 MG tablet Take 1 tablet (3.125 mg total) by mouth 2 (two) times daily. 60 tablet 11     clopidogreL (PLAVIX) 75 mg tablet Take 1 tablet (75 mg total) by mouth once  daily. 90 tablet 3     furosemide (LASIX) 40 MG tablet Take 1 tablet (40 mg total) by mouth once daily. Please hold until follow up with PCP       HYDROcodone-acetaminophen (NORCO) 5-325 mg per tablet Take 1 tablet by mouth every 4 (four) hours as needed for Pain. 12 tablet 0     insulin aspart U-100 (NOVOLOG FLEXPEN U-100 INSULIN) 100 unit/mL (3 mL) InPn pen Inject 5 Units into the skin 3 (three) times daily with meals. If not eating, ok to hold 1 Box 3     insulin detemir U-100 (LEVEMIR FLEXTOUCH) 100 unit/mL (3 mL) SubQ InPn pen Inject 12 Units into the skin every evening. 20 mL 0     magnesium oxide (MAG-OX) 400 mg (241.3 mg magnesium) tablet Take 1 tablet (400 mg total) by mouth 2 (two) times daily.  0     melatonin (MELATIN) 3 mg tablet Take 2 tablets (6 mg total) by mouth nightly as needed for Insomnia.  0     mirtazapine (REMERON) 7.5 MG Tab Take 2 tablets (15 mg total) by mouth nightly. 60 tablet 11     multivit/folic acid/vit K1 (ONE-A-DAY WOMEN'S 50 PLUS ORAL) Take 1 tablet by mouth Daily.       ondansetron (ZOFRAN-ODT) 8 MG TbDL Dissolve 1 tablet (8 mg total) by mouth every 8 (eight) hours as needed. 30 tablet 0     oxyCODONE-acetaminophen (PERCOCET)  mg per tablet Take 1 tablet by mouth every 6 (six) hours as needed. 20 tablet 0     rivaroxaban (XARELTO) 2.5 mg Tab Take 1 tablet (2.5 mg total) by mouth 2 (two) times daily with meals. 30 tablet 11     sertraline (ZOLOFT) 50 MG tablet Take 1 tablet (50 mg total) by mouth once daily. 30 tablet 11     sodium bicarbonate 650 MG tablet Take 1 tablet (650 mg total) by mouth 2 (two) times daily. Please hold until follow up with Primary Care Provider 60 tablet 11        Review of patient's allergies indicates:   Allergen Reactions    Ciprofloxacin Itching    Contrast media      Kidney injury    Iodine      Kidney injury       Past Medical History:   Diagnosis Date    Anxiety     Chronic pain syndrome     CKD (chronic kidney disease),  stage III     Depression     Diabetes mellitus     type 2    Diabetes mellitus, type 2     GERD (gastroesophageal reflux disease)     Hyperemesis 3/23/2021    Hyperlipemia     Hypertension     Hypokalemia 3/23/2021    Myocardial infarction 2010    minor-caused by stress per pt.    Osteomyelitis     Osteomyelitis of left foot 4/30/2021    PVD (peripheral vascular disease)     Vaginal delivery     x1     Past Surgical History:   Procedure Laterality Date    ABOVE-KNEE AMPUTATION Left 5/18/2021    Procedure: AMPUTATION, ABOVE KNEE;  Surgeon: Teddy Huber MD;  Location: [facility] OR [address] [place] [address];  Service: Vascular;  Laterality: Left;    Angiogram - Right Extremity Right 7/9/15    angiogram-left leg  10/6/15    ANGIOGRAPHY OF LOWER EXTREMITY Left 4/29/2021    Procedure: ANGIOGRAM, LOWER EXTREMITY;  Surgeon: Teddy Huber MD;  Location: [facility] OR [address] [place] [address];  Service: Vascular;  Laterality: Left;    BELOW KNEE AMPUTATION OF LOWER EXTREMITY Right 12/28/2021    Procedure: AMPUTATION, BELOW KNEE;  Surgeon: Kaitlyn Rojas MD;  Location: [facility];  Service: General;  Laterality: Right;    CATHETERIZATION OF BOTH LEFT AND RIGHT HEART N/A 12/18/2019    Procedure: CATHETERIZATION, HEART, BOTH LEFT AND RIGHT;  Surgeon: Que Fernando III, MD;  Location: [facility] CATH LAB;  Service: Cardiology;  Laterality: N/A;    CORONARY ANGIOGRAPHY N/A 12/18/2019    Procedure: ANGIOGRAM, CORONARY ARTERY;  Surgeon: Que Fernando III, MD;  Location: [facility] CATH LAB;  Service: Cardiology;  Laterality: N/A;    CORONARY ANGIOGRAPHY INCLUDING BYPASS GRAFTS WITH CATHETERIZATION OF LEFT HEART N/A 7/28/2020    Procedure: ANGIOGRAM, CORONARY, INCLUDING BYPASS GRAFT, WITH LEFT HEART CATHETERIZATION, 9 am;  Surgeon: Rachel Easley MD;  Location: [facility] CATH LAB;  Service: Cardiology;  Laterality: N/A;    CORONARY ARTERY BYPASS GRAFT (CABG) N/A 1/14/2020    Procedure: CORONARY ARTERY BYPASS GRAFT (CABG) x 1     Off Pump;   Surgeon: Huang Altamirano MD;  Location: [address];  Service: Cardiovascular;  Laterality: N/A;    CREATION OF FEMORAL-TIBIAL ARTERY BYPASS Left 4/29/2021    Procedure: CREATION, BYPASS, ARTERIAL, FEMORAL TO ANTERIOR TIBIAL;  Surgeon: Teddy Huber MD;  [address];  Service: Vascular;  Laterality: Left;    CREATION OF FEMOROPOPLITEAL ARTERIAL BYPASS USING GRAFT Left 8/18/2020    Procedure: CREATION, BYPASS, ARTERIAL, FEMORAL TO POPLITEAL, USING GRAFT, LEFT LOWER EXTREMITY;  Surgeon: Teddy Huber MD;  Location: [facility];  Service: Vascular;  Laterality: Left;  REQUEST 7:15 A.M. START----COVID NEGATIVE ON 8/17 1ST CASE STARTE PER LEANA ON 8/7/2020 @ 942AM-  RN PREOP 8/12/2020   T/S-----CLEARED BY CARDS-------PENDING INSURANCE    DEBRIDEMENT OF FOOT Left 9/8/2020    Procedure: DEBRIDEMENT, FOOT;  Surgeon: Rosio Mayes DPM;  Location: [facility];  Service: Podiatry;  Laterality: Left;  request neoxx .   RN Pre Op 9-4-2020, Covid negative on 9/5/20. C A    DEBRIDEMENT OF FOOT  3/4/2021    Procedure: DEBRIDEMENT, FOOT;  Surgeon: Teddy Huber MD;  Location: [facility] OR;  Service: Vascular;;    DEBRIDEMENT OF FOOT Left 3/9/2021    Procedure: DEBRIDEMENT, FOOT, bone biopsy;  Surgeon: Rosio Mayes DPM;  Location: [facility] OR;  Service: Podiatry;  Laterality: Left;  Request neoxx---COVID IN AM  REQUESTING NOON START  RN Phone Pre op.On Blood thinners Plavix and Eliquis.  Covid am of surgery. C A    DEBRIDEMENT OF FOOT Left 5/4/2021    Procedure: DEBRIDEMENT, FOOT;  Surgeon: Farooq Morley DPM;  Location: [address];  Service: Podiatry;  Laterality: Left;    INSERTION OF TUNNELED CENTRAL VENOUS HEMODIALYSIS CATHETER N/A 1/27/2020    Procedure: Insertion, Catheter, Central Venous, Hemodialysis;  Surgeon: ESTEBAN Gomez III, MD;  Location: [facility] CATH LAB;  Service: Peripheral Vascular;  Laterality: N/A;    PERCUTANEOUS TRANSLUMINAL ANGIOPLASTY N/A 3/4/2021     Procedure: PTA (ANGIOPLASTY, PERCUTANEOUS, TRANSLUMINAL);  Surgeon: Teddy Huber MD;  Location: [facility] OR;  Service: Vascular;  Laterality: N/A;    REMOVAL OF ARTERIOVENOUS GRAFT Left 5/27/2021    Procedure: REMOVAL, GRAFT, LEFT LOWER EXTREMITY, WOUND EXPLORATION;  Surgeon: Teddy Huber MD;  Location: [facility] OR 2ND FLR;  Service: Vascular;  Laterality: Left;    REMOVAL OF NAIL OF DIGIT Left 3/9/2021    Procedure: REMOVAL, NAIL, DIGIT;  Surgeon: Rosio Mayes DPM;  Location: [facility] OR;  Service: Podiatry;  Laterality: Left;    THROMBECTOMY Left 3/4/2021    Procedure: THROMBECTOMY, LEFT LOWER EXTREMITY BYPASS GRAFT, ANGIOGRAM, POSSIBLE INTERVENTION, POSSIBLE LEFT LOWER EXTREMITY BYPASS;  Surgeon: Teddy Huber MD;  Location: [facility] OR;  Service: Vascular;  Laterality: Left;    THROMBECTOMY Left 4/29/2021    Procedure: GRAFT THROMBECTOMY, LEFT LOWER EXTREMITY;  Surgeon: Teddy Huber MD;  Location: [facility] OR 2ND FLR;  Service: Vascular;  Laterality: Left;  14.5 min  1179.85 mGy  341.01 Gycm2  240 ml dye    THROMBECTOMY  10/22/2021    Procedure: THROMBECTOMY;  Surgeon: Saad Arenas MD;  Location: [facility] CATH LAB/EP;  Service: Cardiology;;     Family History       Problem Relation (Age of Onset)    Diabetes Mother, Father, Paternal Grandmother    Heart disease Maternal Grandmother    No Known Problems Maternal Grandfather, Paternal Grandfather          Tobacco Use    Smoking status: Former Smoker    Smokeless tobacco: Never Used   Substance and Sexual Activity    Alcohol use: No    Drug use: Yes     Types: Marijuana     Comment: occassional    Sexual activity: Yes     Partners: Male     Review of Systems   Constitutional:  Negative for chills and fever.   Respiratory:  Negative for cough and shortness of breath.    Cardiovascular:  Negative for chest pain and palpitations.   Gastrointestinal:  Negative for abdominal pain, nausea and vomiting.   Musculoskeletal:  Negative for back pain.    Skin:  Positive for wound.   Neurological:  Positive for weakness. Negative for dizziness.   Hematological:  Bruises/bleeds easily.   All other systems reviewed and are negative.  Objective:     Vital Signs (Most Recent):  Temp: 98.5 °F (36.9 °C) (03/15/22 1604)  Pulse: 102 (03/15/22 1604)  Resp: 19 (03/15/22 1604)  BP: (!) 146/80 (03/15/22 1604)  SpO2: 100 % (03/15/22 1604)   Vital Signs (24h Range):  Temp:  [98.1 °F (36.7 °C)-98.6 °F (37 °C)] 98.5 °F (36.9 °C)  Pulse:  [] 102  Resp:  [16-19] 19  SpO2:  [96 %-100 %] 100 %  BP: (125-147)/(63-80) 146/80     Weight: 65.3 kg (144 lb)  Body mass index is 23.96 kg/m².    Physical Exam  Vitals reviewed.   Constitutional:       General: She is not in acute distress.     Appearance: She is not toxic-appearing.   HENT:      Head: Normocephalic and atraumatic.   Eyes:      Extraocular Movements: Extraocular movements intact.      Conjunctiva/sclera: Conjunctivae normal.   Cardiovascular:      Rate and Rhythm: Normal rate.      Comments: 2+ L femoral pulse  1+ R femoral pulse  Pulmonary:      Effort: Pulmonary effort is normal. No respiratory distress.   Abdominal:      General: Abdomen is flat. There is no distension.      Palpations: Abdomen is soft.      Tenderness: There is no abdominal tenderness.   Musculoskeletal:         General: Normal range of motion.      Cervical back: Normal range of motion.      Comments: Well healed L AKA stump.   R BKA stump with partial incisional dehiscence; moderate amount of fibrinous exudate and seropurulent discharge from open aspect of incision.    Skin:     General: Skin is warm and dry.   Neurological:      General: No focal deficit present.      Mental Status: She is alert and oriented to person, place, and time.       Significant Labs:  CBC:   Recent Labs   Lab 03/15/22  0451   WBC 7.34   RBC 3.20*   HGB 9.6*   HCT 30.4*      MCV 95   MCH 30.0   MCHC 31.6*     CMP:   Recent Labs   Lab 03/15/22  0451   *    CALCIUM 9.5   ALBUMIN 2.2*   PROT 7.2      K 5.1   CO2 21*      BUN 47*   CREATININE 1.6*   ALKPHOS 94   ALT 6*   AST 11   BILITOT 0.3       Significant Diagnostics:  I have reviewed all pertinent imaging results/findings within the past 24 hours.    Assessment/Plan:     * Infection of amputation stump of right lower extremity  57 yo M with complex past medical history who is currently admitted with osteomyelitis and non-healing R BKA. Vascular surgery consulted for evaluation.    - To OR for R stump revision to AKA on Friday 3/18.  - Consent obtained, risks and benefits discussed.   - Please make NPO at midnight the evening prior to surgery  - Please hold Xarelto starting now, okay to continue Plavix and ASA given recent cardiac stenting and history  - Patient does not require Covid testing pre-op given that she has received both vaccines.   - From vascular surgery standpoint, okay to discharge patient home and have her present as an outpatient for surgery on Friday. Final plan for dispo this admission per the medicine team.     Please call with questions or concerns.         Thank you for your consult. I will follow-up with patient. Please contact us if you have any additional questions.    Genny Aldana MD  Vascular Surgery  [facility]

## 2022-03-15 NOTE — PLAN OF CARE
Pt AAOx4. Vital signs as charted. Safety measures in place. No c/o pain or nausea. PIV intact, abx administered. Voiding without difficulty. BG monitored. Weight shifting encouraged. Pt resting throughout night. No falls or injuries at this time.

## 2022-03-15 NOTE — ASSESSMENT & PLAN NOTE
55 y/o female with Hx of CAD s/p CABG, DM2 c/b DM foot/osteo s/p left AKA and right BKA 12/2021 with stump revision 1/22 c/b wound infection (ESBL klebsiella/pseudomonas) s/p meropenem x 2 weeks with ETD 2/7 ( CRP 2/3 ~83), recent admission for AMS and myoclonus 2/2 ROSLYN and medication SE (pregabalin/ mirtazapine)- Now presenting after syncope episode - She has been afebrile - wbc ~9 - CRP ~24 (decreased). She was started on vanc/zosyn- Abx been on hold to optimize culture data- MRI with early osteo- per primary IR recommend against bone biopsy at this time given open wound and risk of introducing infection. Suspect ESBL klebsiella in Ucx colonization as patient been asymptomatic.    Recommendations:    1. Per Vascular surgery plan for AKA on 3/18- will discontinue Abx for now to increase yield of culture results  - recommend to start IV meropenem post surgery pending surgical OR cultures and path- if negative and source control achieved no need for further Abx > if culture or path positive please re-consult ID.    2. Appreciate primary/vascular teams.

## 2022-03-15 NOTE — SUBJECTIVE & OBJECTIVE
Interval History:  Pt seen and examined this morning on nellie GLASGOW. Pt tearful this morning: surgeons had just left and informed her they will need to do a right AKA. Although she is upset, she understands that this is necessary, and considered that it was a possibility. Reviewed need to continue following ID and vascular surgery's guidance at this time.. Care plan reviewed. Otherwise, doing well and with no further complaints at this time.      Objective:     Vital Signs (Most Recent):  Temp: 98.1 °F (36.7 °C) (03/15/22 1208)  Pulse: 86 (03/15/22 1208)  Resp: 18 (03/15/22 1208)  BP: (!) 147/68 (03/15/22 1208)  SpO2: 97 % (03/15/22 1208)   Vital Signs (24h Range):  Temp:  [97 °F (36.1 °C)-98.6 °F (37 °C)] 98.1 °F (36.7 °C)  Pulse:  [] 86  Resp:  [16-19] 18  SpO2:  [96 %-98 %] 97 %  BP: (125-147)/(58-68) 147/68     Weight: 65.3 kg (144 lb)  Body mass index is 23.96 kg/m².    Intake/Output Summary (Last 24 hours) at 3/15/2022 1232  Last data filed at 3/14/2022 2257  Gross per 24 hour   Intake --   Output 550 ml   Net -550 ml        Physical Exam  Gen: in NAD, appears stated age  Neuro: AAOx4, CN2-12 grossly intact BL; motor, sensory, and strength grossly intact BL  HEENT: NTNC, EOMI, PERRLA, MMM; no thyromegaly or lymphadenopathy; no JVD appreciated  CVS: RRR, no m/r/g; S1/S2 auscultated with no S3 or S4; capillary refill < 2 sec  Resp: lungs CTAB, no w/r/r; no belabored breathing or accessory muscle use appreciated   Abd: BS+ in all 4 quadrants; NTND, soft to palpation; no organomegaly appreciated   Extrem: pulses full, equal, and regular over all 4 extremities; s/p right BKA and s/p right AKA; see image of RLE below          Significant Labs: All pertinent labs within the past 24 hours have been reviewed.    Significant Imaging: I have reviewed all pertinent imaging results/findings within the past 24 hours.

## 2022-03-15 NOTE — PLAN OF CARE
ID stating we can hold off on tunneled line for abx. IR made aware.    Hitesh Quijano MD  Attending Physician  Department of Hospital Medicine  Epic secure chat preferred, or [employer]Link ext. [phone]  3/15/2022

## 2022-03-15 NOTE — PROGRESS NOTES
[facility] - Surgery  Infectious Disease  Progress Note    Patient Name: Suyapa Connelly  MRN: [id number]  Admission Date: 3/11/2022  Length of Stay: 1 days  Attending Physician: Hitesh Quijano MD  Primary Care Provider: Magen Christensen MD    Isolation Status: Contact  Assessment/Plan:      * Infection of amputation stump of right lower extremity  57 y/o female with Hx of CAD s/p CABG, DM2 c/b DM foot/osteo s/p left AKA and right BKA 12/2021 with stump revision 1/22 c/b wound infection (ESBL klebsiella/pseudomonas) s/p meropenem x 2 weeks with ETD 2/7 ( CRP 2/3 ~83), recent admission for AMS and myoclonus 2/2 ROSLYN and medication SE (pregabalin/ mirtazapine)- Now presenting after syncope episode - She has been afebrile - wbc ~9 - CRP ~24 (decreased). She was started on vanc/zosyn- Abx been on hold to optimize culture data- MRI with early osteo- per primary IR recommend against bone biopsy at this time given open wound and risk of introducing infection. Suspect ESBL klebsiella in Ucx colonization as patient been asymptomatic.    Recommendations:    1. Per Vascular surgery plan for AKA on 3/18- will discontinue Abx for now to increase yield of culture results  - recommend to start IV meropenem post surgery pending surgical OR cultures and path- if negative and source control achieved no need for further Abx > if culture or path positive please re-consult ID.    2. Appreciate primary/vascular teams.          Thank you for your consult. I will sign off. Please contact us if you have any additional questions.    Carline Schwarz MD  Infectious Disease  [facility] - Surgery    Subjective:     Principal Problem:Infection of amputation stump of right lower extremity    HPI: Suyapa Connelly is 57 y/o female with Hx of CAD s/p CABG, DM2 c/b DM foot/osteo s/p left AKA and right BKA 12/2021 with stump revision 1/22 c/b wound infection (ESBL klebsiella/pseudomonas) s/p meropenem x 2 weeks with ETD 2/7 ( CRP 2/3 ~83), recent  admission for AMS and myoclonus 2/2 ROSLYN and medication SE (pregabalin/ mirtazapine) - Now presented with episode of syncope last few secs per her  - patient now back to baseline.    Patient and her  report that she was feeling nauseous (chronic nausea) and she became unresponsive for few sec with eyes opened no jerky movements. She denies any fever, chills, abdominal pain, dysuria or drainage from stump - she denies pain in the stump and  been doing her dressing.    Patient did not tolerate cipro 2/2 to itching but did tolerate levofloxacin.    ID consulted for concern for stump infection.    Interval History: no acute events noted.      Review of Systems   Constitutional:  Positive for activity change. Negative for appetite change, chills and fever.   HENT:  Negative for congestion.    Eyes:  Negative for pain and itching.   Respiratory:  Negative for cough, chest tightness and shortness of breath.    Cardiovascular:  Negative for palpitations and leg swelling.   Gastrointestinal:  Negative for abdominal pain and nausea.   Endocrine: Negative for polyphagia and polyuria.   Genitourinary:  Negative for dysuria and frequency.   Musculoskeletal:  Negative for back pain.   Neurological:  Negative for numbness and headaches.   Psychiatric/Behavioral:  Negative for agitation and behavioral problems.    Objective:     Vital Signs (Most Recent):  Temp: 98.5 °F (36.9 °C) (03/15/22 0539)  Pulse: 86 (03/15/22 0539)  Resp: 18 (03/15/22 0539)  BP: (!) 142/65 (03/15/22 0539)  SpO2: 97 % (03/15/22 0539)   Vital Signs (24h Range):  Temp:  [97 °F (36.1 °C)-98.5 °F (36.9 °C)] 98.5 °F (36.9 °C)  Pulse:  [] 86  Resp:  [16-20] 18  SpO2:  [96 %-98 %] 97 %  BP: (123-147)/(55-68) 142/65     Weight: 65.3 kg (144 lb)  Body mass index is 23.96 kg/m².    Estimated Creatinine Clearance: 35.7 mL/min (A) (based on SCr of 1.6 mg/dL (H)).    Physical Exam  Constitutional:       Appearance: Normal appearance.   HENT:       Head: Normocephalic and atraumatic.   Cardiovascular:      Rate and Rhythm: Normal rate.   Pulmonary:      Effort: Pulmonary effort is normal.   Abdominal:      General: Abdomen is flat.      Palpations: Abdomen is soft.   Musculoskeletal:      Comments: Left AKA and right bka with open wound   Skin:     General: Skin is warm and dry.   Neurological:      Mental Status: She is alert and oriented to person, place, and time.   Psychiatric:         Behavior: Behavior normal.       Significant Labs:   Microbiology Results (last 7 days)       Procedure Component Value Units Date/Time    Blood culture #1 **CANNOT BE ORDERED STAT** [[id number]] Collected: 03/12/22 0050    Order Status: Completed Specimen: Blood from Peripheral, Antecubital, Left Updated: 03/15/22 0612     Blood Culture, Routine No Growth to date      No Growth to date      No Growth to date      No Growth to date    Blood culture [[id number]] Collected: 03/13/22 1237    Order Status: Completed Specimen: Blood Updated: 03/14/22 1412     Blood Culture, Routine No Growth to date      No Growth to date    Blood culture [[id number]] Collected: 03/13/22 1237    Order Status: Completed Specimen: Blood Updated: 03/14/22 1412     Blood Culture, Routine No Growth to date      No Growth to date    Urine culture [[id number]]  (Abnormal)  (Susceptibility) Collected: 03/12/22 0835    Order Status: Completed Specimen: Urine Updated: 03/14/22 1411     Urine Culture, Routine KLEBSIELLA PNEUMONIAE ESBL  10,000 - 49,999 cfu/ml      Narrative:      Specimen Source->Urine    Blood culture #2 **CANNOT BE ORDERED STAT** [[id number]]  (Abnormal) Collected: 03/12/22 0050    Order Status: Completed Specimen: Blood from Peripheral, Antecubital, Right Updated: 03/14/22 0824     Blood Culture, Routine Gram stain salvatore bottle: Gram positive cocci in clusters resembling Staph       Results called to and read back by:Monet Sanchez RN 03/13/2022  03:22      COAGULASE-NEGATIVE STAPHYLOCOCCUS  SPECIES  Organism is a probable contaminant              Significant Imaging: I have reviewed all pertinent imaging results/findings within the past 24 hours.

## 2022-03-16 ENCOUNTER — PATIENT MESSAGE (OUTPATIENT)
Dept: ADMINISTRATIVE | Facility: HOSPITAL | Age: 57
End: 2022-03-16
Payer: MEDICARE

## 2022-03-16 VITALS
HEART RATE: 99 BPM | DIASTOLIC BLOOD PRESSURE: 72 MMHG | OXYGEN SATURATION: 98 % | HEIGHT: 65 IN | RESPIRATION RATE: 16 BRPM | TEMPERATURE: 97 F | BODY MASS INDEX: 23.99 KG/M2 | SYSTOLIC BLOOD PRESSURE: 142 MMHG | WEIGHT: 144 LBS

## 2022-03-16 LAB
ALBUMIN SERPL BCP-MCNC: 2.2 G/DL (ref 3.5–5.2)
ALP SERPL-CCNC: 99 U/L (ref 55–135)
ALT SERPL W/O P-5'-P-CCNC: 7 U/L (ref 10–44)
ANION GAP SERPL CALC-SCNC: 9 MMOL/L (ref 8–16)
AST SERPL-CCNC: 8 U/L (ref 10–40)
BASOPHILS # BLD AUTO: 0.05 K/UL (ref 0–0.2)
BASOPHILS NFR BLD: 1 % (ref 0–1.9)
BILIRUB SERPL-MCNC: 0.1 MG/DL (ref 0.1–1)
BUN SERPL-MCNC: 46 MG/DL (ref 6–20)
CALCIUM SERPL-MCNC: 9.6 MG/DL (ref 8.7–10.5)
CHLORIDE SERPL-SCNC: 104 MMOL/L (ref 95–110)
CO2 SERPL-SCNC: 23 MMOL/L (ref 23–29)
CREAT SERPL-MCNC: 1.8 MG/DL (ref 0.5–1.4)
DIFFERENTIAL METHOD: ABNORMAL
EOSINOPHIL # BLD AUTO: 0.2 K/UL (ref 0–0.5)
EOSINOPHIL NFR BLD: 3.9 % (ref 0–8)
ERYTHROCYTE [DISTWIDTH] IN BLOOD BY AUTOMATED COUNT: 16.4 % (ref 11.5–14.5)
EST. GFR  (AFRICAN AMERICAN): 36 ML/MIN/1.73 M^2
EST. GFR  (NON AFRICAN AMERICAN): 31.2 ML/MIN/1.73 M^2
GLUCOSE SERPL-MCNC: 256 MG/DL (ref 70–110)
HCT VFR BLD AUTO: 37.4 % (ref 37–48.5)
HGB BLD-MCNC: 11.9 G/DL (ref 12–16)
IMM GRANULOCYTES # BLD AUTO: 0.02 K/UL (ref 0–0.04)
IMM GRANULOCYTES NFR BLD AUTO: 0.4 % (ref 0–0.5)
LYMPHOCYTES # BLD AUTO: 1.3 K/UL (ref 1–4.8)
LYMPHOCYTES NFR BLD: 26.3 % (ref 18–48)
MCH RBC QN AUTO: 29.7 PG (ref 27–31)
MCHC RBC AUTO-ENTMCNC: 31.8 G/DL (ref 32–36)
MCV RBC AUTO: 93 FL (ref 82–98)
MONOCYTES # BLD AUTO: 0.6 K/UL (ref 0.3–1)
MONOCYTES NFR BLD: 11.6 % (ref 4–15)
NEUTROPHILS # BLD AUTO: 2.8 K/UL (ref 1.8–7.7)
NEUTROPHILS NFR BLD: 56.8 % (ref 38–73)
NRBC BLD-RTO: 0 /100 WBC
PLATELET # BLD AUTO: 328 K/UL (ref 150–450)
PMV BLD AUTO: 10.1 FL (ref 9.2–12.9)
POCT GLUCOSE: 256 MG/DL (ref 70–110)
POTASSIUM SERPL-SCNC: 4.5 MMOL/L (ref 3.5–5.1)
PROT SERPL-MCNC: 7.5 G/DL (ref 6–8.4)
RBC # BLD AUTO: 4.01 M/UL (ref 4–5.4)
SODIUM SERPL-SCNC: 136 MMOL/L (ref 136–145)
WBC # BLD AUTO: 4.9 K/UL (ref 3.9–12.7)

## 2022-03-16 PROCEDURE — 80053 COMPREHEN METABOLIC PANEL: CPT | Performed by: STUDENT IN AN ORGANIZED HEALTH CARE EDUCATION/TRAINING PROGRAM

## 2022-03-16 PROCEDURE — 25000003 PHARM REV CODE 250: Performed by: INTERNAL MEDICINE

## 2022-03-16 PROCEDURE — 99239 HOSP IP/OBS DSCHRG MGMT >30: CPT | Mod: ,,, | Performed by: STUDENT IN AN ORGANIZED HEALTH CARE EDUCATION/TRAINING PROGRAM

## 2022-03-16 PROCEDURE — 85025 COMPLETE CBC W/AUTO DIFF WBC: CPT | Performed by: STUDENT IN AN ORGANIZED HEALTH CARE EDUCATION/TRAINING PROGRAM

## 2022-03-16 PROCEDURE — 36415 COLL VENOUS BLD VENIPUNCTURE: CPT | Performed by: STUDENT IN AN ORGANIZED HEALTH CARE EDUCATION/TRAINING PROGRAM

## 2022-03-16 PROCEDURE — 1111F PR DISCHARGE MEDS RECONCILED W/ CURRENT OUTPATIENT MED LIST: ICD-10-PCS | Mod: CPTII,,, | Performed by: STUDENT IN AN ORGANIZED HEALTH CARE EDUCATION/TRAINING PROGRAM

## 2022-03-16 PROCEDURE — 25000003 PHARM REV CODE 250: Performed by: STUDENT IN AN ORGANIZED HEALTH CARE EDUCATION/TRAINING PROGRAM

## 2022-03-16 PROCEDURE — 1111F DSCHRG MED/CURRENT MED MERGE: CPT | Mod: CPTII,,, | Performed by: STUDENT IN AN ORGANIZED HEALTH CARE EDUCATION/TRAINING PROGRAM

## 2022-03-16 PROCEDURE — 99239 PR HOSPITAL DISCHARGE DAY,>30 MIN: ICD-10-PCS | Mod: ,,, | Performed by: STUDENT IN AN ORGANIZED HEALTH CARE EDUCATION/TRAINING PROGRAM

## 2022-03-16 RX ORDER — NIFEDIPINE 30 MG/1
30 TABLET, EXTENDED RELEASE ORAL DAILY
Qty: 30 TABLET | Refills: 2 | Status: ON HOLD | OUTPATIENT
Start: 2022-03-17 | End: 2023-05-02 | Stop reason: CLARIF

## 2022-03-16 RX ORDER — HYDROCODONE BITARTRATE AND ACETAMINOPHEN 10; 325 MG/1; MG/1
1 TABLET ORAL EVERY 6 HOURS PRN
Qty: 8 TABLET | Refills: 0 | Status: SHIPPED | OUTPATIENT
Start: 2022-03-16 | End: 2022-03-18

## 2022-03-16 RX ADMIN — CLOPIDOGREL 75 MG: 75 TABLET, FILM COATED ORAL at 08:03

## 2022-03-16 RX ADMIN — INSULIN ASPART 4 UNITS: 100 INJECTION, SOLUTION INTRAVENOUS; SUBCUTANEOUS at 09:03

## 2022-03-16 RX ADMIN — NIFEDIPINE 30 MG: 30 TABLET, FILM COATED, EXTENDED RELEASE ORAL at 08:03

## 2022-03-16 RX ADMIN — CARVEDILOL 3.12 MG: 3.12 TABLET, FILM COATED ORAL at 08:03

## 2022-03-16 RX ADMIN — FUROSEMIDE 40 MG: 40 TABLET ORAL at 08:03

## 2022-03-16 RX ADMIN — HYDROCODONE BITARTRATE AND ACETAMINOPHEN 1 TABLET: 5; 325 TABLET ORAL at 09:03

## 2022-03-16 RX ADMIN — SERTRALINE HYDROCHLORIDE 50 MG: 50 TABLET ORAL at 08:03

## 2022-03-16 RX ADMIN — ALLOPURINOL 100 MG: 100 TABLET ORAL at 08:03

## 2022-03-16 RX ADMIN — SODIUM BICARBONATE 650 MG TABLET 650 MG: at 08:03

## 2022-03-16 NOTE — PLAN OF CARE
EDUARD cancelled referrals to [facility].      Karina Sebastian, EDUARD  Case Management   [facility]   Ext. [phone]

## 2022-03-16 NOTE — NURSING
Patient taken off floor by transport via wheelchair.  present, all belongings taken. VSS, no outward s/s of distress, IV removed from R-AC, Prescriptions brought to bedside, patient verbalized understanding of discharge. No questions or concerns noted. Refer to flowsheet and charts for more information.

## 2022-03-16 NOTE — PLAN OF CARE
Andrew Andrade - Surgery  Discharge Final Note    Primary Care Provider: Magen Christensen MD    Expected Discharge Date: 3/16/2022    Final Discharge Note (most recent)     Final Note - 03/16/22 1242        Final Note    Assessment Type Final Discharge Note     Anticipated Discharge Disposition Home or Self Care     What phone number can be called within the next 1-3 days to see how you are doing after discharge? [phone]     Hospital Resources/Appts/Education Provided Provided patient/caregiver with written discharge plan information;Appointments scheduled by Navigator/Coordinator               Patient to discharge home without  services and return to hospital for surgery 3/18/22.  Discussed plan with patient and her spouse and all in agreement.    Future Appointments   Date Time Provider Department Center   3/22/2022  1:00 PM Carline Schwarz MD [facility] ID [facility]   4/4/2022  2:30 PM Fawad Jameson MD [facility] ENDODIA [facility]   4/7/2022 10:00 AM Kaitlyn Rojas MD [facility] WOUND [facility]        Important Message from Medicare  Important Message from Medicare regarding Discharge Appeal Rights: Explained to patient/caregiver, Signed/date by patient/caregiver, Other (comments) (verbal)     Date IMM was signed: 03/15/22  Time IMM was signed: 0923    Contact Info     Magen Christensen MD   Specialty: Family Medicine   Relationship: PCP - General    [address] [place] [id number]   Phone: [phone]       Next Steps: Schedule an appointment as soon as possible for a visit

## 2022-03-16 NOTE — DISCHARGE SUMMARY
Andrew Andrade - Surgery  [facility] Medicine  Discharge Summary      Patient Name: Suyapa Connelly  MRN: [id number]  Patient Class: IP- Inpatient  Admission Date: 3/11/2022  Hospital Length of Stay: 2 days  Discharge Date and Time:  03/16/2022 10:48 AM  Attending Physician: Hitesh Quijano MD   Discharging Provider: Hitesh Quijano MD  Primary Care Provider: Magen Christensen MD  [facility] Medicine Team: [facility] MED  Hitesh Quijano MD    HPI:   55 yo female with CAD s/p CABG, CKD3, DM2, osteomyelitis with previous left AKA and newer Right BKA (12/2021), and recent hospitalization for sepsis and wound infection presenting for nausea, syncope, vomitting. Symptoms resolved about 15 seconds and returned to baseline afterwards. She ultimately denies any current symptom; however she does report that she has been having trouble following up about her BKA and cleaning it at home. There is some purulent drainage from the site of her BKA however.         Procedure(s) (LRB):  AMPUTATION, ABOVE KNEE (Right)      Hospital Course:   Pt admitted to  team G. ID consulted, recommended discontinuing abx given no leukocytosis and that she remained afebrile. Vascular surgery consulted and recommended that pt follow-up with her vascular team at [place]. MRI RLE obtained, which showed mild increased abnormal medullary signal within the distal 1 cm of the tibial stump with associated cortical indistinctness, findings that are concerning for early osteomyelitis; stable medullary edema within the distal 1 cm of the fibular stump with grossly preserved cortical margins, possibly reactive; persistent soft tissue edema about the tibial and fibular stumps with ill-defined fluid, possibly sterile or infectious in nature (no focal drainable fluid collection); prominent nonspecific myositis throughout the visualized anterior compartment of the thigh, sartorius and to a lesser extent the gracilis; abnormal signal intensity within the visualized  superficial femoral and popliteal arteries presumably related to known thrombosis.    UCx with GNRs and Bcx with GPCs in 1/4 bottles: ID aware, still recommending to hold abx while cultures result. Pt and  reported that they do not wish to continue following with vascular at [place]. Discussed with [facility] vascular fellow (Aundrea) who stated he would schedule and outpatient appointment with Dr. Florez in the [facility] vascular clinic.    BCx great coag-negative staph, deemed likely contaminant. ID recommended proceeding with inpatient bone biopsy. IR felt that surgical services might be more appropriate, and vascular still deferring for outpatient management. ID to consider 6 week of IV abx course. On 3/15, vascular made decision to proceed with right AKA on 3/18.    Pt was cleared to return as an outpatient for AKA, and ID stated the pt did not need IV abx due to HDS and absence of current fever or leukocytosis. ID stated not to treat ESBL Klebsiella found in urine cultures because pt was asymptomatic; she may follow in their clinic. Xarelto held pre-op before surgery. Pt deemed appropriate for discharge. Plan discussed with pt, who was agreeable and amenable; medications were discussed and reviewed, outpatient follow-up scheduled, ER precautions were given, all questions were answered to the pt's satisfaction, and Mrs. Connelly was subsequently discharged.         Goals of Care Treatment Preferences:  Code Status: Full Code      Consults:   Consults (From admission, onward)        Status Ordering Provider     Inpatient consult to Interventional Radiology  Once        Provider:  (Not yet assigned)    Completed ELSA LINDER     Inpatient consult to Vascular Surgery  Once        Provider:  (Not yet assigned)    Completed ELSA LINDER     [facility]  Once        Provider:  (Not yet assigned)    Completed ELSA LINDER     Inpatient consult to Vascular Surgery  Once        Provider:   (Not yet assigned)    Completed ELSA LINDER     Inpatient consult to Infectious Diseases  Once        Provider:  (Not yet assigned)    Completed MICHELLE ESPARZA          Final Active Diagnoses:    Diagnosis Date Noted POA    PRINCIPAL PROBLEM:  Infection of amputation stump of right lower extremity [T87.43] 03/12/2022 Yes    Urinary tract infection due to ESBL Klebsiella [N39.0, B96.89] 03/15/2022 Yes    Class 2 severe obesity due to excess calories with serious comorbidity in adult [E66.01] 05/24/2021 Yes    Stage 3 chronic kidney disease [N18.30] 07/18/2020 Yes     Chronic    Paroxysmal atrial fibrillation [I48.0] 01/28/2020 Yes     Chronic    Type 2 diabetes mellitus with hyperglycemia, with long-term current use of insulin [E11.65, Z79.4] 01/02/2020 Not Applicable    Coronary atherosclerosis [I25.10] 01/02/2020 Yes    Mixed hyperlipidemia [E78.2] 12/13/2019 Yes     Chronic    CAROLIN (generalized anxiety disorder) [F41.1] 05/07/2018 Yes     Chronic    Essential hypertension [I10] 05/05/2018 Yes    Peripheral vascular disease [I73.9] 10/06/2015 Yes    Acute-on-chronic renal failure [N17.9, N18.9] 07/07/2015 Yes      Problems Resolved During this Admission:       Discharged Condition: stable    Disposition: Home or Self Care    Follow Up:   Follow-up Information     Magen Christensen MD. Schedule an appointment as soon as possible for a visit.    Specialty: Family Medicine  Contact information:  [address] [place] [id number]  [phone]                       Patient Instructions:      Ambulatory referral/consult to Outpatient Case Management   Referral Priority: Routine Referral Type: Consultation   Referral Reason: Specialty Services Required   Number of Visits Requested: 1     Ambulatory referral/consult to Vascular Surgery   Standing Status: Future   Referral Priority: Routine Referral Type: Consultation   Referral Reason: Specialty Services Required   Requested Specialty: Vascular  Surgery   Number of Visits Requested: 1     Ambulatory referral/consult to Infectious Disease   Standing Status: Future   Referral Priority: Routine Referral Type: Consultation   Referral Reason: Specialty Services Required   Requested Specialty: Infectious Diseases   Number of Visits Requested: 1     Ambulatory referral/consult to Family Practice   Standing Status: Future   Referral Priority: Routine Referral Type: Consultation   Referral Reason: Specialty Services Required   Requested Specialty: Family Medicine   Number of Visits Requested: 1     Diet diabetic     Diet Cardiac     Diet renal     Notify your health care provider if you experience any of the following:  increased confusion or weakness     Notify your health care provider if you experience any of the following:  persistent dizziness, light-headedness, or visual disturbances     Notify your health care provider if you experience any of the following:  worsening rash     Notify your health care provider if you experience any of the following:  severe persistent headache     Notify your health care provider if you experience any of the following:  difficulty breathing or increased cough     Notify your health care provider if you experience any of the following:  redness, tenderness, or signs of infection (pain, swelling, redness, odor or green/yellow discharge around incision site)     Notify your health care provider if you experience any of the following:  severe uncontrolled pain     Notify your health care provider if you experience any of the following:  persistent nausea and vomiting or diarrhea     Notify your health care provider if you experience any of the following:  temperature >100.4     Activity as tolerated       Significant Diagnostic Studies: Labs: All labs within the past 24 hours have been reviewed    Pending Diagnostic Studies:     None         Medications:  Reconciled Home Medications:      Medication List      START taking these  medications    HYDROcodone-acetaminophen  mg per tablet  Commonly known as: NORCO  Take 1 tablet by mouth every 6 (six) hours as needed for Pain.  Replaces: HYDROcodone-acetaminophen 5-325 mg per tablet     NIFEdipine 30 MG (OSM) 24 hr tablet  Commonly known as: PROCARDIA-XL  Take 1 tablet (30 mg total) by mouth once daily.  Start taking on: March 17, 2022        CONTINUE taking these medications    acetaminophen 500 MG tablet  Commonly known as: TYLENOL  Take 2 tablets (1,000 mg total) by mouth every 8 (eight) hours as needed for Pain.     allopurinoL 100 MG tablet  Commonly known as: ZYLOPRIM  Take 1 tablet (100 mg total) by mouth once daily.     atorvastatin 80 MG tablet  Commonly known as: LIPITOR  Take 1 tablet (80 mg total) by mouth every evening.     carvediloL 3.125 MG tablet  Commonly known as: COREG  Take 1 tablet (3.125 mg total) by mouth 2 (two) times daily.     clopidogreL 75 mg tablet  Commonly known as: PLAVIX  Take 1 tablet (75 mg total) by mouth once daily.     DEXCOM G6  Misc  Generic drug: blood-glucose meter,continuous  Use to check blood sugars 4 times/day     DEXCOM G6 SENSOR Radha  Generic drug: blood-glucose sensor  Apply one sensor to skin every 10 days     DEXCOM G6 TRANSMITTER Radha  Generic drug: blood-glucose transmitter  Replace transmitter every 3 months to use with dexcom sensor     furosemide 40 MG tablet  Commonly known as: LASIX  Take 1 tablet (40 mg total) by mouth once daily. Please hold until follow up with PCP     insulin aspart U-100 100 unit/mL (3 mL) Inpn pen  Commonly known as: NovoLOG Flexpen U-100 Insulin  Inject 5 Units into the skin 3 (three) times daily with meals. If not eating, ok to hold     insulin detemir U-100 100 unit/mL (3 mL) Inpn pen  Commonly known as: Levemir FLEXTOUCH  Inject 12 Units into the skin every evening.     magnesium oxide 400 mg (241.3 mg magnesium) tablet  Commonly known as: MAG-OX  Take 1 tablet (400 mg total) by mouth 2 (two)  times daily.     melatonin 3 mg tablet  Commonly known as: MELATIN  Take 2 tablets (6 mg total) by mouth nightly as needed for Insomnia.     mirtazapine 7.5 MG Tab  Commonly known as: REMERON  Take 2 tablets (15 mg total) by mouth nightly.     ondansetron 8 MG Tbdl  Commonly known as: ZOFRAN-ODT  Dissolve 1 tablet (8 mg total) by mouth every 8 (eight) hours as needed.     ONE-A-DAY WOMEN'S 50 PLUS ORAL  Take 1 tablet by mouth Daily.     sertraline 50 MG tablet  Commonly known as: ZOLOFT  Take 1 tablet (50 mg total) by mouth once daily.     sodium bicarbonate 650 MG tablet  Take 1 tablet (650 mg total) by mouth 2 (two) times daily. Please hold until follow up with Primary Care Provider        STOP taking these medications    HYDROcodone-acetaminophen 5-325 mg per tablet  Commonly known as: NORCO  Replaced by: HYDROcodone-acetaminophen  mg per tablet     oxyCODONE-acetaminophen  mg per tablet  Commonly known as: PERCOCET     XARELTO 2.5 mg Tab  Generic drug: rivaroxaban            Indwelling Lines/Drains at time of discharge:   Lines/Drains/Airways     None                 Time spent on the discharge of patient: 40 minutes         Hitesh Quijano MD  Attending Physician  Department of Hospital Medicine  Epic secure chat preferred, or SpectraLink ext. [phone]  3/16/2022

## 2022-03-16 NOTE — PLAN OF CARE
Problem: Adult Inpatient Plan of Care  Goal: Plan of Care Review  Outcome: Met  Goal: Patient-Specific Goal (Individualized)  Outcome: Met  Goal: Absence of Hospital-Acquired Illness or Injury  Outcome: Met  Goal: Optimal Comfort and Wellbeing  Outcome: Met  Goal: Readiness for Transition of Care  Outcome: Met     Problem: Diabetes Comorbidity  Goal: Blood Glucose Level Within Targeted Range  Outcome: Met     Problem: Adjustment to Illness (Sepsis/Septic Shock)  Goal: Optimal Coping  Outcome: Met     Problem: Bleeding (Sepsis/Septic Shock)  Goal: Absence of Bleeding  Outcome: Met     Problem: Glycemic Control Impaired (Sepsis/Septic Shock)  Goal: Blood Glucose Level Within Desired Range  Outcome: Met     Problem: Infection Progression (Sepsis/Septic Shock)  Goal: Absence of Infection Signs and Symptoms  Outcome: Met     Problem: Nutrition Impaired (Sepsis/Septic Shock)  Goal: Optimal Nutrition Intake  Outcome: Met     Problem: Fluid and Electrolyte Imbalance (Acute Kidney Injury/Impairment)  Goal: Fluid and Electrolyte Balance  Outcome: Met     Problem: Oral Intake Inadequate (Acute Kidney Injury/Impairment)  Goal: Optimal Nutrition Intake  Outcome: Met     Problem: Renal Function Impairment (Acute Kidney Injury/Impairment)  Goal: Effective Renal Function  Outcome: Met     Problem: Infection  Goal: Absence of Infection Signs and Symptoms  Outcome: Met     Problem: Impaired Wound Healing  Goal: Optimal Wound Healing  Outcome: Met     Problem: Skin Injury Risk Increased  Goal: Skin Health and Integrity  Outcome: Met     Problem: Fall Injury Risk  Goal: Absence of Fall and Fall-Related Injury  Outcome: Met     Problem: Adult Inpatient Plan of Care  Goal: Plan of Care Review  Outcome: Met  Goal: Patient-Specific Goal (Individualized)  Outcome: Met  Goal: Absence of Hospital-Acquired Illness or Injury  Outcome: Met  Goal: Optimal Comfort and Wellbeing  Outcome: Met  Goal: Readiness for Transition of Care  Outcome:  Transferred from [facility]. Decompensated Schizophrenia. w/ disorganized thought process, paranoia, Tenriism preoccupations    -psych rec continuing Seroquel 50 mg q4 PRN, Ativan 1mg PRN (for severe agitation) and Zyprexa 2.5 mg IM q6 PRN for agitation and holding Ativan if not having any effect due to deliriogenic properties that may be worsening agitation. No Haldol due to Parkinson's disease  - agitated at times, now calm and cooperative   - per psych discussion with Dr. Chung at [facility], patient has been manic, labile, tearful and hyperreligious while at [facility]  - continue depakote 750mg qhs and Cogentin 0.5mg BID  - c/w Risperdal 1.5mg BID    - c/w Ativan 1 mg PO BID per psych to avoid withdrawal  - decrease cogentin to 0.25mg BID  - psych rec continuing to hold trazodone and gabapentin due to recent AMS and to avoid polypharmacy    - pt will need inpatient psychiatric treatment for further stabilization per psych. Met     Problem: Diabetes Comorbidity  Goal: Blood Glucose Level Within Targeted Range  Outcome: Met     Problem: Adjustment to Illness (Sepsis/Septic Shock)  Goal: Optimal Coping  Outcome: Met     Problem: Bleeding (Sepsis/Septic Shock)  Goal: Absence of Bleeding  Outcome: Met     Problem: Glycemic Control Impaired (Sepsis/Septic Shock)  Goal: Blood Glucose Level Within Desired Range  Outcome: Met     Problem: Infection Progression (Sepsis/Septic Shock)  Goal: Absence of Infection Signs and Symptoms  Outcome: Met     Problem: Nutrition Impaired (Sepsis/Septic Shock)  Goal: Optimal Nutrition Intake  Outcome: Met     Problem: Fluid and Electrolyte Imbalance (Acute Kidney Injury/Impairment)  Goal: Fluid and Electrolyte Balance  Outcome: Met     Problem: Oral Intake Inadequate (Acute Kidney Injury/Impairment)  Goal: Optimal Nutrition Intake  Outcome: Met     Problem: Renal Function Impairment (Acute Kidney Injury/Impairment)  Goal: Effective Renal Function  Outcome: Met     Problem: Infection  Goal: Absence of Infection Signs and Symptoms  Outcome: Met     Problem: Impaired Wound Healing  Goal: Optimal Wound Healing  Outcome: Met     Problem: Skin Injury Risk Increased  Goal: Skin Health and Integrity  Outcome: Met     Problem: Fall Injury Risk  Goal: Absence of Fall and Fall-Related Injury  Outcome: Met      Transferred from [facility]. Decompensated Schizophrenia. w/ disorganized thought process, paranoia, [employer] preoccupations    -psych rec continuing Seroquel 50 mg q4 PRN, Ativan 1mg PRN (for severe agitation) and Zyprexa 2.5 mg IM q6 PRN for agitation and holding Ativan if not having any effect due to deliriogenic properties that may be worsening agitation. No Haldol due to Parkinson's disease  - agitated at times, now calm and cooperative   - per psych discussion with Dr. Chung at [facility], patient has been manic, labile, tearful and hyperreligious while at [facility]  - continue depakote 750mg qhs and Cogentin 0.5mg BID  - c/w Risperdal 1.5mg BID    - c/w Ativan 1 mg PO BID per psych to avoid withdrawal  - decrease cogentin to 0.25mg BID  - psych rec continuing to hold trazodone and gabapentin due to recent AMS and to avoid polypharmacy    - pt will need inpatient psychiatric treatment for further stabilization per psych. going [facility] on Monday Dec 6 per [facility]

## 2022-03-17 ENCOUNTER — TELEPHONE (OUTPATIENT)
Dept: VASCULAR SURGERY | Facility: CLINIC | Age: 57
End: 2022-03-17
Payer: MEDICARE

## 2022-03-17 LAB — BACTERIA BLD CULT: NORMAL

## 2022-03-17 NOTE — TELEPHONE ENCOUNTER
Spoke with the pt and informed her of the time of arrival for [facility] in 11am [facility].Pt also informed not to eat or drink anything after midnight and she verbalized understanding of information received.

## 2022-03-18 ENCOUNTER — ANESTHESIA (OUTPATIENT)
Dept: SURGERY | Facility: HOSPITAL | Age: 57
DRG: 475 | End: 2022-03-18
Payer: MEDICARE

## 2022-03-18 ENCOUNTER — HOSPITAL ENCOUNTER (INPATIENT)
Facility: HOSPITAL | Age: 57
LOS: 4 days | Discharge: HOME HEALTH | DRG: 475 | End: 2022-03-22
Attending: SURGERY | Admitting: SURGERY
Payer: MEDICARE

## 2022-03-18 ENCOUNTER — ANESTHESIA EVENT (OUTPATIENT)
Dept: SURGERY | Facility: HOSPITAL | Age: 57
DRG: 475 | End: 2022-03-18
Payer: MEDICARE

## 2022-03-18 DIAGNOSIS — E11.65 TYPE 2 DIABETES MELLITUS WITH HYPERGLYCEMIA, WITH LONG-TERM CURRENT USE OF INSULIN: ICD-10-CM

## 2022-03-18 DIAGNOSIS — Z79.4 TYPE 2 DIABETES MELLITUS WITH HYPERGLYCEMIA, WITH LONG-TERM CURRENT USE OF INSULIN: ICD-10-CM

## 2022-03-18 DIAGNOSIS — I73.9 PVD (PERIPHERAL VASCULAR DISEASE): ICD-10-CM

## 2022-03-18 DIAGNOSIS — T14.8XXA SURGICAL WOUND PRESENT: Primary | ICD-10-CM

## 2022-03-18 DIAGNOSIS — Z97.8 USES SELF-APPLIED CONTINUOUS GLUCOSE MONITORING DEVICE: ICD-10-CM

## 2022-03-18 DIAGNOSIS — I73.9 PERIPHERAL VASCULAR DISEASE: ICD-10-CM

## 2022-03-18 DIAGNOSIS — T87.40 INFECTION OF BELOW KNEE AMPUTATION STUMP: ICD-10-CM

## 2022-03-18 DIAGNOSIS — E55.9 VITAMIN D DEFICIENCY: ICD-10-CM

## 2022-03-18 LAB
ABO + RH BLD: NORMAL
ANION GAP SERPL CALC-SCNC: 8 MMOL/L (ref 8–16)
BACTERIA BLD CULT: NORMAL
BACTERIA BLD CULT: NORMAL
BASOPHILS # BLD AUTO: 0.05 K/UL (ref 0–0.2)
BASOPHILS NFR BLD: 0.6 % (ref 0–1.9)
BLD GP AB SCN CELLS X3 SERPL QL: NORMAL
BLD PROD TYP BPU: NORMAL
BLOOD UNIT EXPIRATION DATE: NORMAL
BLOOD UNIT TYPE CODE: 5100
BLOOD UNIT TYPE: NORMAL
BUN SERPL-MCNC: 60 MG/DL (ref 6–20)
CALCIUM SERPL-MCNC: 8.6 MG/DL (ref 8.7–10.5)
CHLORIDE SERPL-SCNC: 107 MMOL/L (ref 95–110)
CO2 SERPL-SCNC: 21 MMOL/L (ref 23–29)
CODING SYSTEM: NORMAL
CREAT SERPL-MCNC: 2.3 MG/DL (ref 0.5–1.4)
DIFFERENTIAL METHOD: ABNORMAL
DISPENSE STATUS: NORMAL
EOSINOPHIL # BLD AUTO: 0.5 K/UL (ref 0–0.5)
EOSINOPHIL NFR BLD: 5.6 % (ref 0–8)
ERYTHROCYTE [DISTWIDTH] IN BLOOD BY AUTOMATED COUNT: 15.7 % (ref 11.5–14.5)
ERYTHROCYTE [DISTWIDTH] IN BLOOD BY AUTOMATED COUNT: 16.5 % (ref 11.5–14.5)
EST. GFR  (AFRICAN AMERICAN): 26.8 ML/MIN/1.73 M^2
EST. GFR  (NON AFRICAN AMERICAN): 23.2 ML/MIN/1.73 M^2
ESTIMATED AVG GLUCOSE: 154 MG/DL (ref 68–131)
GLUCOSE SERPL-MCNC: 245 MG/DL (ref 70–110)
HBA1C MFR BLD: 7 % (ref 4–5.6)
HCT VFR BLD AUTO: 20.3 % (ref 37–48.5)
HCT VFR BLD AUTO: 28.2 % (ref 37–48.5)
HGB BLD-MCNC: 6.5 G/DL (ref 12–16)
HGB BLD-MCNC: 8.7 G/DL (ref 12–16)
IMM GRANULOCYTES # BLD AUTO: 0.02 K/UL (ref 0–0.04)
IMM GRANULOCYTES NFR BLD AUTO: 0.2 % (ref 0–0.5)
LYMPHOCYTES # BLD AUTO: 1.9 K/UL (ref 1–4.8)
LYMPHOCYTES NFR BLD: 23.9 % (ref 18–48)
MCH RBC QN AUTO: 29.9 PG (ref 27–31)
MCH RBC QN AUTO: 31.1 PG (ref 27–31)
MCHC RBC AUTO-ENTMCNC: 30.9 G/DL (ref 32–36)
MCHC RBC AUTO-ENTMCNC: 32 G/DL (ref 32–36)
MCV RBC AUTO: 97 FL (ref 82–98)
MCV RBC AUTO: 97 FL (ref 82–98)
MONOCYTES # BLD AUTO: 0.8 K/UL (ref 0.3–1)
MONOCYTES NFR BLD: 10 % (ref 4–15)
NEUTROPHILS # BLD AUTO: 4.8 K/UL (ref 1.8–7.7)
NEUTROPHILS NFR BLD: 59.7 % (ref 38–73)
NRBC BLD-RTO: 0 /100 WBC
PLATELET # BLD AUTO: 383 K/UL (ref 150–450)
PLATELET # BLD AUTO: 445 K/UL (ref 150–450)
PMV BLD AUTO: 10.6 FL (ref 9.2–12.9)
PMV BLD AUTO: 11 FL (ref 9.2–12.9)
POCT GLUCOSE: 181 MG/DL (ref 70–110)
POCT GLUCOSE: >500 MG/DL (ref 70–110)
POTASSIUM SERPL-SCNC: 5.8 MMOL/L (ref 3.5–5.1)
RBC # BLD AUTO: 2.09 M/UL (ref 4–5.4)
RBC # BLD AUTO: 2.91 M/UL (ref 4–5.4)
SODIUM SERPL-SCNC: 136 MMOL/L (ref 136–145)
TRANS ERYTHROCYTES VOL PATIENT: NORMAL ML
WBC # BLD AUTO: 12.67 K/UL (ref 3.9–12.7)
WBC # BLD AUTO: 8.03 K/UL (ref 3.9–12.7)

## 2022-03-18 PROCEDURE — 25000003 PHARM REV CODE 250: Performed by: STUDENT IN AN ORGANIZED HEALTH CARE EDUCATION/TRAINING PROGRAM

## 2022-03-18 PROCEDURE — 27590 AMPUTATE LEG AT THIGH: CPT | Mod: RT,,, | Performed by: SURGERY

## 2022-03-18 PROCEDURE — 85025 COMPLETE CBC W/AUTO DIFF WBC: CPT | Performed by: STUDENT IN AN ORGANIZED HEALTH CARE EDUCATION/TRAINING PROGRAM

## 2022-03-18 PROCEDURE — 88304 TISSUE EXAM BY PATHOLOGIST: CPT | Performed by: PATHOLOGY

## 2022-03-18 PROCEDURE — 64445 NJX AA&/STRD SCIATIC NRV IMG: CPT | Mod: 59,RT,, | Performed by: ANESTHESIOLOGY

## 2022-03-18 PROCEDURE — 36415 COLL VENOUS BLD VENIPUNCTURE: CPT

## 2022-03-18 PROCEDURE — D9220A PRA ANESTHESIA: Mod: CRNA,,, | Performed by: NURSE ANESTHETIST, CERTIFIED REGISTERED

## 2022-03-18 PROCEDURE — 63600175 PHARM REV CODE 636 W HCPCS: Performed by: STUDENT IN AN ORGANIZED HEALTH CARE EDUCATION/TRAINING PROGRAM

## 2022-03-18 PROCEDURE — 37000008 HC ANESTHESIA 1ST 15 MINUTES: Performed by: SURGERY

## 2022-03-18 PROCEDURE — P9021 RED BLOOD CELLS UNIT: HCPCS | Performed by: SURGERY

## 2022-03-18 PROCEDURE — P9021 RED BLOOD CELLS UNIT: HCPCS

## 2022-03-18 PROCEDURE — 80048 BASIC METABOLIC PNL TOTAL CA: CPT | Mod: 91 | Performed by: STUDENT IN AN ORGANIZED HEALTH CARE EDUCATION/TRAINING PROGRAM

## 2022-03-18 PROCEDURE — 71000016 HC POSTOP RECOV ADDL HR: Performed by: SURGERY

## 2022-03-18 PROCEDURE — 37000009 HC ANESTHESIA EA ADD 15 MINS: Performed by: SURGERY

## 2022-03-18 PROCEDURE — 71000033 HC RECOVERY, INTIAL HOUR: Performed by: SURGERY

## 2022-03-18 PROCEDURE — 88304 TISSUE EXAM BY PATHOLOGIST: CPT | Mod: 26,,, | Performed by: PATHOLOGY

## 2022-03-18 PROCEDURE — 86900 BLOOD TYPING SEROLOGIC ABO: CPT | Performed by: STUDENT IN AN ORGANIZED HEALTH CARE EDUCATION/TRAINING PROGRAM

## 2022-03-18 PROCEDURE — 88311 DECALCIFY TISSUE: CPT | Performed by: PATHOLOGY

## 2022-03-18 PROCEDURE — 88305 TISSUE EXAM BY PATHOLOGIST: ICD-10-PCS | Mod: 26,,, | Performed by: PATHOLOGY

## 2022-03-18 PROCEDURE — 27000221 HC OXYGEN, UP TO 24 HOURS

## 2022-03-18 PROCEDURE — 64448 NJX AA&/STRD FEM NRV NFS IMG: CPT | Performed by: STUDENT IN AN ORGANIZED HEALTH CARE EDUCATION/TRAINING PROGRAM

## 2022-03-18 PROCEDURE — 76942 ECHO GUIDE FOR BIOPSY: CPT | Mod: 26,,, | Performed by: ANESTHESIOLOGY

## 2022-03-18 PROCEDURE — D9220A PRA ANESTHESIA: Mod: ANES,,, | Performed by: ANESTHESIOLOGY

## 2022-03-18 PROCEDURE — 80048 BASIC METABOLIC PNL TOTAL CA: CPT

## 2022-03-18 PROCEDURE — 94761 N-INVAS EAR/PLS OXIMETRY MLT: CPT

## 2022-03-18 PROCEDURE — 20600001 HC STEP DOWN PRIVATE ROOM

## 2022-03-18 PROCEDURE — 86850 RBC ANTIBODY SCREEN: CPT | Performed by: STUDENT IN AN ORGANIZED HEALTH CARE EDUCATION/TRAINING PROGRAM

## 2022-03-18 PROCEDURE — 36000710: Performed by: SURGERY

## 2022-03-18 PROCEDURE — 88304 PR  SURG PATH,LEVEL III: ICD-10-PCS | Mod: 26,,, | Performed by: PATHOLOGY

## 2022-03-18 PROCEDURE — 76942 FEMORAL NERVE CATHETER: ICD-10-PCS | Mod: 26,,, | Performed by: ANESTHESIOLOGY

## 2022-03-18 PROCEDURE — 25000003 PHARM REV CODE 250: Performed by: NURSE ANESTHETIST, CERTIFIED REGISTERED

## 2022-03-18 PROCEDURE — C1751 CATH, INF, PER/CENT/MIDLINE: HCPCS

## 2022-03-18 PROCEDURE — 36000711: Performed by: SURGERY

## 2022-03-18 PROCEDURE — 64448 FEMORAL NERVE CATHETER: ICD-10-PCS | Mod: 59,RT,, | Performed by: ANESTHESIOLOGY

## 2022-03-18 PROCEDURE — C9399 UNCLASSIFIED DRUGS OR BIOLOG: HCPCS | Performed by: STUDENT IN AN ORGANIZED HEALTH CARE EDUCATION/TRAINING PROGRAM

## 2022-03-18 PROCEDURE — 27590 PR AMPUTATE THIGH,THRU FEMUR: ICD-10-PCS | Mod: RT,,, | Performed by: SURGERY

## 2022-03-18 PROCEDURE — 86920 COMPATIBILITY TEST SPIN: CPT

## 2022-03-18 PROCEDURE — 82962 GLUCOSE BLOOD TEST: CPT | Performed by: SURGERY

## 2022-03-18 PROCEDURE — 86920 COMPATIBILITY TEST SPIN: CPT | Performed by: SURGERY

## 2022-03-18 PROCEDURE — 83036 HEMOGLOBIN GLYCOSYLATED A1C: CPT

## 2022-03-18 PROCEDURE — 64445 NJX AA&/STRD SCIATIC NRV IMG: CPT | Performed by: STUDENT IN AN ORGANIZED HEALTH CARE EDUCATION/TRAINING PROGRAM

## 2022-03-18 PROCEDURE — 63600175 PHARM REV CODE 636 W HCPCS: Performed by: NURSE ANESTHETIST, CERTIFIED REGISTERED

## 2022-03-18 PROCEDURE — 25000003 PHARM REV CODE 250: Performed by: ANESTHESIOLOGY

## 2022-03-18 PROCEDURE — 64445 SCIATIC NERVE SINGLE INJECTION BLOCK: ICD-10-PCS | Mod: 59,RT,, | Performed by: ANESTHESIOLOGY

## 2022-03-18 PROCEDURE — 94799 UNLISTED PULMONARY SVC/PX: CPT

## 2022-03-18 PROCEDURE — D9220A PRA ANESTHESIA: ICD-10-PCS | Mod: CRNA,,, | Performed by: NURSE ANESTHETIST, CERTIFIED REGISTERED

## 2022-03-18 PROCEDURE — 88311 DECALCIFY TISSUE: CPT | Mod: 26,,, | Performed by: PATHOLOGY

## 2022-03-18 PROCEDURE — D9220A PRA ANESTHESIA: ICD-10-PCS | Mod: ANES,,, | Performed by: ANESTHESIOLOGY

## 2022-03-18 PROCEDURE — 85027 COMPLETE CBC AUTOMATED: CPT | Performed by: STUDENT IN AN ORGANIZED HEALTH CARE EDUCATION/TRAINING PROGRAM

## 2022-03-18 PROCEDURE — C1729 CATH, DRAINAGE: HCPCS | Performed by: SURGERY

## 2022-03-18 PROCEDURE — 71000015 HC POSTOP RECOV 1ST HR: Performed by: SURGERY

## 2022-03-18 PROCEDURE — 88305 TISSUE EXAM BY PATHOLOGIST: CPT | Mod: 26,,, | Performed by: PATHOLOGY

## 2022-03-18 PROCEDURE — 88311 PR  DECALCIFY TISSUE: ICD-10-PCS | Mod: 26,,, | Performed by: PATHOLOGY

## 2022-03-18 PROCEDURE — 88305 TISSUE EXAM BY PATHOLOGIST: CPT | Performed by: PATHOLOGY

## 2022-03-18 PROCEDURE — 63600175 PHARM REV CODE 636 W HCPCS

## 2022-03-18 PROCEDURE — 64448 NJX AA&/STRD FEM NRV NFS IMG: CPT | Mod: 59,RT,, | Performed by: ANESTHESIOLOGY

## 2022-03-18 PROCEDURE — 27201423 OPTIME MED/SURG SUP & DEVICES STERILE SUPPLY: Performed by: SURGERY

## 2022-03-18 RX ORDER — GLUCAGON 1 MG
1 KIT INJECTION
Status: DISCONTINUED | OUTPATIENT
Start: 2022-03-18 | End: 2022-03-18

## 2022-03-18 RX ORDER — ACETAMINOPHEN 10 MG/ML
INJECTION, SOLUTION INTRAVENOUS
Status: DISCONTINUED | OUTPATIENT
Start: 2022-03-18 | End: 2022-03-18

## 2022-03-18 RX ORDER — ACETAMINOPHEN 500 MG
1000 TABLET ORAL EVERY 8 HOURS
Status: DISCONTINUED | OUTPATIENT
Start: 2022-03-18 | End: 2022-03-22 | Stop reason: HOSPADM

## 2022-03-18 RX ORDER — CLINDAMYCIN PHOSPHATE 900 MG/50ML
900 INJECTION, SOLUTION INTRAVENOUS
Status: COMPLETED | OUTPATIENT
Start: 2022-03-18 | End: 2022-03-18

## 2022-03-18 RX ORDER — NIFEDIPINE 30 MG/1
30 TABLET, EXTENDED RELEASE ORAL DAILY
Status: DISCONTINUED | OUTPATIENT
Start: 2022-03-19 | End: 2022-03-22 | Stop reason: HOSPADM

## 2022-03-18 RX ORDER — INSULIN ASPART 100 [IU]/ML
5 INJECTION, SOLUTION INTRAVENOUS; SUBCUTANEOUS
Status: DISCONTINUED | OUTPATIENT
Start: 2022-03-18 | End: 2022-03-18

## 2022-03-18 RX ORDER — LANOLIN ALCOHOL/MO/W.PET/CERES
400 CREAM (GRAM) TOPICAL 2 TIMES DAILY
Status: DISCONTINUED | OUTPATIENT
Start: 2022-03-18 | End: 2022-03-22 | Stop reason: HOSPADM

## 2022-03-18 RX ORDER — IBUPROFEN 200 MG
16 TABLET ORAL
Status: DISCONTINUED | OUTPATIENT
Start: 2022-03-18 | End: 2022-03-18

## 2022-03-18 RX ORDER — MIDAZOLAM HYDROCHLORIDE 1 MG/ML
INJECTION INTRAMUSCULAR; INTRAVENOUS
Status: COMPLETED
Start: 2022-03-18 | End: 2022-03-18

## 2022-03-18 RX ORDER — DEXAMETHASONE SODIUM PHOSPHATE 4 MG/ML
INJECTION, SOLUTION INTRA-ARTICULAR; INTRALESIONAL; INTRAMUSCULAR; INTRAVENOUS; SOFT TISSUE
Status: DISCONTINUED | OUTPATIENT
Start: 2022-03-18 | End: 2022-03-18

## 2022-03-18 RX ORDER — INSULIN ASPART 100 [IU]/ML
0-5 INJECTION, SOLUTION INTRAVENOUS; SUBCUTANEOUS
Status: DISCONTINUED | OUTPATIENT
Start: 2022-03-18 | End: 2022-03-18

## 2022-03-18 RX ORDER — SODIUM CHLORIDE 9 MG/ML
INJECTION, SOLUTION INTRAVENOUS CONTINUOUS
Status: DISCONTINUED | OUTPATIENT
Start: 2022-03-18 | End: 2022-03-19

## 2022-03-18 RX ORDER — ALLOPURINOL 100 MG/1
100 TABLET ORAL DAILY
Status: DISCONTINUED | OUTPATIENT
Start: 2022-03-19 | End: 2022-03-22 | Stop reason: HOSPADM

## 2022-03-18 RX ORDER — HEPARIN SODIUM 5000 [USP'U]/ML
5000 INJECTION, SOLUTION INTRAVENOUS; SUBCUTANEOUS EVERY 8 HOURS
Status: DISCONTINUED | OUTPATIENT
Start: 2022-03-18 | End: 2022-03-22 | Stop reason: HOSPADM

## 2022-03-18 RX ORDER — SODIUM BICARBONATE 650 MG/1
650 TABLET ORAL 2 TIMES DAILY
Status: DISCONTINUED | OUTPATIENT
Start: 2022-03-18 | End: 2022-03-22 | Stop reason: HOSPADM

## 2022-03-18 RX ORDER — SERTRALINE HYDROCHLORIDE 50 MG/1
50 TABLET, FILM COATED ORAL DAILY
Status: DISCONTINUED | OUTPATIENT
Start: 2022-03-19 | End: 2022-03-22 | Stop reason: HOSPADM

## 2022-03-18 RX ORDER — BUPIVACAINE HYDROCHLORIDE AND EPINEPHRINE 5; 5 MG/ML; UG/ML
INJECTION, SOLUTION EPIDURAL; INTRACAUDAL; PERINEURAL
Status: COMPLETED | OUTPATIENT
Start: 2022-03-18 | End: 2022-03-18

## 2022-03-18 RX ORDER — IBUPROFEN 200 MG
24 TABLET ORAL
Status: DISCONTINUED | OUTPATIENT
Start: 2022-03-18 | End: 2022-03-18

## 2022-03-18 RX ORDER — SODIUM CHLORIDE 0.9 % (FLUSH) 0.9 %
3 SYRINGE (ML) INJECTION
Status: DISCONTINUED | OUTPATIENT
Start: 2022-03-18 | End: 2022-03-22 | Stop reason: HOSPADM

## 2022-03-18 RX ORDER — FENTANYL CITRATE 50 UG/ML
25-200 INJECTION, SOLUTION INTRAMUSCULAR; INTRAVENOUS EVERY 5 MIN PRN
Status: DISCONTINUED | OUTPATIENT
Start: 2022-03-18 | End: 2022-03-18 | Stop reason: HOSPADM

## 2022-03-18 RX ORDER — PROPOFOL 10 MG/ML
VIAL (ML) INTRAVENOUS
Status: DISCONTINUED | OUTPATIENT
Start: 2022-03-18 | End: 2022-03-18

## 2022-03-18 RX ORDER — ROPIVACAINE HYDROCHLORIDE 2 MG/ML
INJECTION, SOLUTION EPIDURAL; INFILTRATION; PERINEURAL CONTINUOUS
Status: DISCONTINUED | OUTPATIENT
Start: 2022-03-18 | End: 2022-03-21

## 2022-03-18 RX ORDER — FUROSEMIDE 40 MG/1
40 TABLET ORAL DAILY
Status: DISCONTINUED | OUTPATIENT
Start: 2022-03-19 | End: 2022-03-22 | Stop reason: HOSPADM

## 2022-03-18 RX ORDER — CEFAZOLIN SODIUM/D5W 2 G/100 ML
2 PLASTIC BAG, INJECTION (ML) INTRAVENOUS
Status: COMPLETED | OUTPATIENT
Start: 2022-03-18 | End: 2022-03-19

## 2022-03-18 RX ORDER — ONDANSETRON 2 MG/ML
INJECTION INTRAMUSCULAR; INTRAVENOUS
Status: DISCONTINUED | OUTPATIENT
Start: 2022-03-18 | End: 2022-03-18

## 2022-03-18 RX ORDER — CARVEDILOL 3.12 MG/1
3.12 TABLET ORAL 2 TIMES DAILY
Status: DISCONTINUED | OUTPATIENT
Start: 2022-03-18 | End: 2022-03-22 | Stop reason: HOSPADM

## 2022-03-18 RX ORDER — HYDROMORPHONE HYDROCHLORIDE 1 MG/ML
0.2 INJECTION, SOLUTION INTRAMUSCULAR; INTRAVENOUS; SUBCUTANEOUS EVERY 5 MIN PRN
Status: DISCONTINUED | OUTPATIENT
Start: 2022-03-18 | End: 2022-03-18 | Stop reason: HOSPADM

## 2022-03-18 RX ORDER — OXYCODONE HYDROCHLORIDE 10 MG/1
10 TABLET ORAL EVERY 4 HOURS PRN
Status: DISCONTINUED | OUTPATIENT
Start: 2022-03-18 | End: 2022-03-20

## 2022-03-18 RX ORDER — ATORVASTATIN CALCIUM 20 MG/1
80 TABLET, FILM COATED ORAL NIGHTLY
Status: DISCONTINUED | OUTPATIENT
Start: 2022-03-18 | End: 2022-03-22 | Stop reason: HOSPADM

## 2022-03-18 RX ORDER — PHENYLEPHRINE HCL IN 0.9% NACL 1 MG/10 ML
SYRINGE (ML) INTRAVENOUS
Status: DISCONTINUED | OUTPATIENT
Start: 2022-03-18 | End: 2022-03-18

## 2022-03-18 RX ORDER — MIRTAZAPINE 15 MG/1
15 TABLET, FILM COATED ORAL NIGHTLY
Status: DISCONTINUED | OUTPATIENT
Start: 2022-03-18 | End: 2022-03-22 | Stop reason: HOSPADM

## 2022-03-18 RX ORDER — MIDAZOLAM HYDROCHLORIDE 1 MG/ML
.5-4 INJECTION INTRAMUSCULAR; INTRAVENOUS
Status: DISCONTINUED | OUTPATIENT
Start: 2022-03-18 | End: 2022-03-18 | Stop reason: HOSPADM

## 2022-03-18 RX ORDER — SODIUM CHLORIDE 9 MG/ML
INJECTION, SOLUTION INTRAVENOUS CONTINUOUS
Status: DISCONTINUED | OUTPATIENT
Start: 2022-03-18 | End: 2022-03-22

## 2022-03-18 RX ORDER — FENTANYL CITRATE 50 UG/ML
INJECTION, SOLUTION INTRAMUSCULAR; INTRAVENOUS
Status: COMPLETED
Start: 2022-03-18 | End: 2022-03-18

## 2022-03-18 RX ORDER — LIDOCAINE HYDROCHLORIDE 20 MG/ML
INJECTION INTRAVENOUS
Status: DISCONTINUED | OUTPATIENT
Start: 2022-03-18 | End: 2022-03-18

## 2022-03-18 RX ORDER — CEFAZOLIN SODIUM 2 G/50ML
2 SOLUTION INTRAVENOUS
Status: DISCONTINUED | OUTPATIENT
Start: 2022-03-18 | End: 2022-03-18

## 2022-03-18 RX ORDER — HYDROMORPHONE HYDROCHLORIDE 1 MG/ML
1 INJECTION, SOLUTION INTRAMUSCULAR; INTRAVENOUS; SUBCUTANEOUS EVERY 4 HOURS PRN
Status: DISCONTINUED | OUTPATIENT
Start: 2022-03-18 | End: 2022-03-20

## 2022-03-18 RX ORDER — HYDROCODONE BITARTRATE AND ACETAMINOPHEN 500; 5 MG/1; MG/1
TABLET ORAL
Status: DISCONTINUED | OUTPATIENT
Start: 2022-03-18 | End: 2022-03-22 | Stop reason: HOSPADM

## 2022-03-18 RX ORDER — ONDANSETRON 2 MG/ML
4 INJECTION INTRAMUSCULAR; INTRAVENOUS EVERY 12 HOURS PRN
Status: DISCONTINUED | OUTPATIENT
Start: 2022-03-18 | End: 2022-03-22 | Stop reason: HOSPADM

## 2022-03-18 RX ORDER — TALC
6 POWDER (GRAM) TOPICAL NIGHTLY PRN
Status: DISCONTINUED | OUTPATIENT
Start: 2022-03-18 | End: 2022-03-22 | Stop reason: HOSPADM

## 2022-03-18 RX ADMIN — LIDOCAINE HYDROCHLORIDE 60 MG: 20 INJECTION, SOLUTION INTRAVENOUS at 02:03

## 2022-03-18 RX ADMIN — MIDAZOLAM 2 MG: 1 INJECTION INTRAMUSCULAR; INTRAVENOUS at 01:03

## 2022-03-18 RX ADMIN — Medication 100 MCG: at 02:03

## 2022-03-18 RX ADMIN — Medication 100 MCG: at 04:03

## 2022-03-18 RX ADMIN — SODIUM CHLORIDE: 0.9 INJECTION, SOLUTION INTRAVENOUS at 05:03

## 2022-03-18 RX ADMIN — ROPIVACAINE HYDROCHLORIDE 200 ML: 2 INJECTION, SOLUTION EPIDURAL; INFILTRATION at 06:03

## 2022-03-18 RX ADMIN — Medication 200 MCG: at 03:03

## 2022-03-18 RX ADMIN — MIRTAZAPINE 15 MG: 15 TABLET, FILM COATED ORAL at 09:03

## 2022-03-18 RX ADMIN — Medication 100 MCG: at 03:03

## 2022-03-18 RX ADMIN — SODIUM BICARBONATE 650 MG: 650 TABLET ORAL at 09:03

## 2022-03-18 RX ADMIN — CARVEDILOL 3.12 MG: 3.12 TABLET, FILM COATED ORAL at 09:03

## 2022-03-18 RX ADMIN — ONDANSETRON HYDROCHLORIDE 4 MG: 2 INJECTION INTRAMUSCULAR; INTRAVENOUS at 02:03

## 2022-03-18 RX ADMIN — ATORVASTATIN CALCIUM 80 MG: 20 TABLET, FILM COATED ORAL at 09:03

## 2022-03-18 RX ADMIN — INSULIN DETEMIR 12 UNITS: 100 INJECTION, SOLUTION SUBCUTANEOUS at 10:03

## 2022-03-18 RX ADMIN — PROPOFOL 110 MG: 10 INJECTION, EMULSION INTRAVENOUS at 02:03

## 2022-03-18 RX ADMIN — HEPARIN SODIUM 5000 UNITS: 5000 INJECTION INTRAVENOUS; SUBCUTANEOUS at 09:03

## 2022-03-18 RX ADMIN — SODIUM CHLORIDE: 0.9 INJECTION, SOLUTION INTRAVENOUS at 02:03

## 2022-03-18 RX ADMIN — FENTANYL CITRATE 100 MCG: 50 INJECTION, SOLUTION INTRAMUSCULAR; INTRAVENOUS at 01:03

## 2022-03-18 RX ADMIN — Medication 400 MG: at 09:03

## 2022-03-18 RX ADMIN — ACETAMINOPHEN 1000 MG: 10 INJECTION INTRAVENOUS at 02:03

## 2022-03-18 RX ADMIN — Medication 6 MG: at 09:03

## 2022-03-18 RX ADMIN — CLINDAMYCIN PHOSPHATE 900 MG: 900 INJECTION INTRAVENOUS at 02:03

## 2022-03-18 RX ADMIN — FENTANYL CITRATE 100 MCG: 50 INJECTION INTRAMUSCULAR; INTRAVENOUS at 01:03

## 2022-03-18 RX ADMIN — DEXAMETHASONE SODIUM PHOSPHATE 4 MG: 4 INJECTION, SOLUTION INTRAMUSCULAR; INTRAVENOUS at 02:03

## 2022-03-18 RX ADMIN — ACETAMINOPHEN 1000 MG: 500 TABLET ORAL at 09:03

## 2022-03-18 RX ADMIN — BUPIVACAINE HYDROCHLORIDE AND EPINEPHRINE BITARTRATE 25 ML: 5; .005 INJECTION, SOLUTION EPIDURAL; INTRACAUDAL; PERINEURAL at 01:03

## 2022-03-18 RX ADMIN — BUPIVACAINE HYDROCHLORIDE AND EPINEPHRINE 20 ML: 5; 5 INJECTION, SOLUTION EPIDURAL; INTRACAUDAL; PERINEURAL at 01:03

## 2022-03-18 RX ADMIN — INSULIN ASPART 3 UNITS: 100 INJECTION, SOLUTION INTRAVENOUS; SUBCUTANEOUS at 10:03

## 2022-03-18 RX ADMIN — OXYCODONE HYDROCHLORIDE 10 MG: 10 TABLET ORAL at 11:03

## 2022-03-18 NOTE — BRIEF OP NOTE
Andrew Andrade - Surgery ([facility])  Brief Operative Note    SUMMARY     Surgery Date: 3/18/2022     Surgeon(s) and Role:     * DAYNE Florez II, MD - Primary     * Hazel Stone MD - Resident - Assisting     * Rizwan Myles MD - Fellow        Pre-op Diagnosis:  S/P BKA (below knee amputation) unilateral, right [Z89.511]    Post-op Diagnosis:  Post-Op Diagnosis Codes:     * S/P BKA (below knee amputation) unilateral, right [Z89.511]    Procedure(s) (LRB):  AMPUTATION, ABOVE KNEE (Right)    Anesthesia: Choice    Operative Findings: Very oozy and edematous tissue.  15 drain left.      Estimated Blood Loss: 800 mL    Estimated Blood Loss has been documented.         Specimens:   Specimen (24h ago, onward)             Start     Ordered    03/18/22 1521  Specimen to Pathology, Surgery Other (Vascular Surgery)  Once        Comments: Pre-op Diagnosis: S/P BKA (below knee amputation) unilateral, right [Z89.511]    Procedure(s):  AMPUTATION, ABOVE KNEE     Number of specimens: 1    Name of specimens: 1. Right Leg Amputation -permanent   References:    Click here for ordering Quick Tip   Question:  Procedure Type:  Answer:  Other  Comment:  Vascular Surgery    03/18/22 1529                [id number]

## 2022-03-18 NOTE — ANESTHESIA PROCEDURE NOTES
Sciatic Nerve Single Injection Block    Patient location during procedure: pre-op   Block not for primary anesthetic.  Reason for block: at surgeon's request and post-op pain management   Post-op Pain Location: R leg   Start time: 3/18/2022 1:23 PM  Timeout: 3/18/2022 1:20 PM   End time: 3/18/2022 1:43 PM    Staffing  Authorizing Provider: Marco Darling MD  Performing Provider: Tiff Tracy MD    Preanesthetic Checklist  Completed: patient identified, IV checked, site marked, risks and benefits discussed, surgical consent, monitors and equipment checked, pre-op evaluation and timeout performed  Peripheral Block  Prep: ChloraPrep  Patient monitoring: heart rate, cardiac monitor, continuous pulse ox, continuous capnometry and frequent blood pressure checks  Block type: sciatic  Laterality: right  Injection technique: single shot  Needle  Needle type: Tuohy   Needle gauge: 17 G  Needle length: 3.5 in  Needle localization: nerve stimulator, anatomical landmarks and ultrasound guidance   -ultrasound image captured on disc.  Assessment  Injection assessment: negative aspiration and negative parasthesia  Paresthesia pain: none  Heart rate change: no  Slow fractionated injection: yes  Pain Tolerance: comfortable throughout block and no complaints  Medications:    Medications: bupivacaine 0.5%-EPINEPHrine 1:200,000 injection - Perineural   25 mL - 3/18/2022 1:40:00 PM    Additional Notes  Challenging block. Attempted Labat approach, but unable to elicit twitch with nerve stimulator. Converted to single shot subgluteal.  VSS.  [facility] RN monitoring vitals throughout procedure.  Patient tolerated procedure well.

## 2022-03-18 NOTE — ANESTHESIA PROCEDURE NOTES
Femoral Nerve Catheter    Patient location during procedure: pre-op   Block not for primary anesthetic.  Reason for block: at surgeon's request and post-op pain management   Post-op Pain Location: R leg   Start time: 3/18/2022 1:47 PM  Timeout: 3/18/2022 1:20 PM   End time: 3/18/2022 1:55 PM    Staffing  Authorizing Provider: Marco Darling MD  Performing Provider: Tiff Tracy MD    Preanesthetic Checklist  Completed: patient identified, IV checked, site marked, risks and benefits discussed, surgical consent, monitors and equipment checked, pre-op evaluation and timeout performed  Peripheral Block  Patient position: supine  Prep: ChloraPrep and site prepped and draped  Patient monitoring: heart rate, cardiac monitor, continuous pulse ox, continuous capnometry and frequent blood pressure checks  Block type: femoral  Laterality: right  Injection technique: continuous  Needle  Needle type: Tuohy   Needle gauge: 17 G  Needle length: 3.5 in  Needle localization: anatomical landmarks and ultrasound guidance  Catheter type: spring wound  Catheter size: 19 G  Test dose: lidocaine 1.5% with Epi 1-to-200,000 and negative   -ultrasound image captured on disc.  Assessment  Injection assessment: negative aspiration, negative parasthesia and local visualized surrounding nerve  Paresthesia pain: none  Heart rate change: no  Slow fractionated injection: yes  Pain Tolerance: comfortable throughout block and no complaints  Medications:    Medications: bupivacaine 0.5%-EPINEPHrine 1:200,000 injection - Perineural   20 mL - 3/18/2022 1:52:00 PM    Additional Notes  VSS.  [facility] RN monitoring vitals throughout procedure.  Patient tolerated procedure well.

## 2022-03-18 NOTE — ANESTHESIA PROCEDURE NOTES
Intubation    Date/Time: 3/18/2022 2:19 PM  Performed by: George Hood CRNA  Authorized by: George Modi MD     Intubation:     Induction:  Intravenous    Intubated:  Postinduction    Mask Ventilation:  Easy mask    Attempts:  1    Attempted By:  CRNA    Difficult Airway Encountered?: No      Complications:  None    Airway Device:  Supraglottic airway/LMA    Airway Device Size:  3.5    Inflation Amount (mL):  5    Secured at:  The lips    Placement Verified By:  Capnometry    Complicating Factors:  None    Findings Post-Intubation:  BS equal bilateral and atraumatic/condition of teeth unchanged

## 2022-03-18 NOTE — ANESTHESIA PREPROCEDURE EVALUATION
03/18/2022  Suyapa Connelly is a 56 y.o., female.      Pre-op Assessment    I have reviewed the Patient Summary Reports.          Review of Systems  Anesthesia Hx:  No problems with previous Anesthesia    Social:  Non-Smoker    Hematology/Oncology:  Hematology Normal   Oncology Normal     EENT/Dental:EENT/Dental Normal   Cardiovascular:   Hypertension Past MI CAD  CABG/stent  CHF    Pulmonary:  Pulmonary Normal    Renal/:   Chronic Renal Disease, CRI    Hepatic/GI:   GERD    Musculoskeletal:  Musculoskeletal Normal    Neurological:   Neuromuscular Disease,    Endocrine:   Diabetes, type 2    Dermatological:  Skin Normal    Psych:   Psychiatric History          Physical Exam  General: Alert and Oriented    Airway:  Mallampati: II / II  Mouth Opening: Normal  TM Distance: Normal  Tongue: Normal  Neck ROM: Normal ROM    Dental:  Intact    Chest/Lungs:  Clear to auscultation, Normal Respiratory Rate    Heart:  Rate: Normal  Rhythm: Regular Rhythm  Sounds: Normal        Anesthesia Plan  Type of Anesthesia, risks & benefits discussed:    Anesthesia Type: Gen ETT, MAC  Intra-op Monitoring Plan: Standard ASA Monitors  Post Op Pain Control Plan: multimodal analgesia  Airway Plan: Direct  Informed Consent: Informed consent signed with the Patient and all parties understand the risks and agree with anesthesia plan.  All questions answered.   ASA Score: 3    Ready For Surgery From Anesthesia Perspective.     .

## 2022-03-18 NOTE — TRANSFER OF CARE
Anesthesia Transfer of Care Note    Patient: Suyapa Connelly    Procedure(s) Performed: Procedure(s) (LRB):  AMPUTATION, ABOVE KNEE (Right)    Patient location: PACU    Transport from OR: Transported from OR on 6-10 L/min O2 by face mask with adequate spontaneous ventilation    Post pain: adequate analgesia    Post assessment: no apparent anesthetic complications    Post vital signs: stable    Level of consciousness: sedated    Nausea/Vomiting: no nausea/vomiting    Complications: none    Transfer of care protocol was followed      Last vitals:   Visit Vitals  BP (!) 115/56   Pulse 82   Temp 36.6 °C (97.9 °F) (Temporal)   Resp 15   Wt 65.3 kg (144 lb)   LMP 09/30/2015 (Exact Date)   SpO2 100%   Breastfeeding No   BMI 23.96 kg/m²

## 2022-03-19 PROBLEM — T14.8XXA SURGICAL WOUND PRESENT: Status: ACTIVE | Noted: 2022-03-19

## 2022-03-19 PROBLEM — T87.40 INFECTION OF BELOW KNEE AMPUTATION STUMP: Status: ACTIVE | Noted: 2022-03-12

## 2022-03-19 LAB
ALLENS TEST: ABNORMAL
ANION GAP SERPL CALC-SCNC: 10 MMOL/L (ref 8–16)
ANION GAP SERPL CALC-SCNC: 7 MMOL/L (ref 8–16)
ANION GAP SERPL CALC-SCNC: 9 MMOL/L (ref 8–16)
ANION GAP SERPL CALC-SCNC: 9 MMOL/L (ref 8–16)
BASOPHILS # BLD AUTO: 0.04 K/UL (ref 0–0.2)
BASOPHILS NFR BLD: 0.4 % (ref 0–1.9)
BUN SERPL-MCNC: 53 MG/DL (ref 6–20)
BUN SERPL-MCNC: 55 MG/DL (ref 6–20)
BUN SERPL-MCNC: 58 MG/DL (ref 6–20)
BUN SERPL-MCNC: 58 MG/DL (ref 6–20)
CALCIUM SERPL-MCNC: 8 MG/DL (ref 8.7–10.5)
CALCIUM SERPL-MCNC: 8 MG/DL (ref 8.7–10.5)
CALCIUM SERPL-MCNC: 8.1 MG/DL (ref 8.7–10.5)
CALCIUM SERPL-MCNC: 8.4 MG/DL (ref 8.7–10.5)
CHLORIDE SERPL-SCNC: 100 MMOL/L (ref 95–110)
CHLORIDE SERPL-SCNC: 102 MMOL/L (ref 95–110)
CHLORIDE SERPL-SCNC: 103 MMOL/L (ref 95–110)
CHLORIDE SERPL-SCNC: 106 MMOL/L (ref 95–110)
CO2 SERPL-SCNC: 19 MMOL/L (ref 23–29)
CO2 SERPL-SCNC: 20 MMOL/L (ref 23–29)
CO2 SERPL-SCNC: 21 MMOL/L (ref 23–29)
CO2 SERPL-SCNC: 23 MMOL/L (ref 23–29)
CREAT SERPL-MCNC: 1.9 MG/DL (ref 0.5–1.4)
CREAT SERPL-MCNC: 1.9 MG/DL (ref 0.5–1.4)
CREAT SERPL-MCNC: 2.3 MG/DL (ref 0.5–1.4)
CREAT SERPL-MCNC: 2.4 MG/DL (ref 0.5–1.4)
DIFFERENTIAL METHOD: ABNORMAL
EOSINOPHIL # BLD AUTO: 0 K/UL (ref 0–0.5)
EOSINOPHIL NFR BLD: 0.4 % (ref 0–8)
ERYTHROCYTE [DISTWIDTH] IN BLOOD BY AUTOMATED COUNT: 15 % (ref 11.5–14.5)
ERYTHROCYTE [DISTWIDTH] IN BLOOD BY AUTOMATED COUNT: 15.1 % (ref 11.5–14.5)
EST. GFR  (AFRICAN AMERICAN): 25.4 ML/MIN/1.73 M^2
EST. GFR  (AFRICAN AMERICAN): 26.8 ML/MIN/1.73 M^2
EST. GFR  (AFRICAN AMERICAN): 33.7 ML/MIN/1.73 M^2
EST. GFR  (AFRICAN AMERICAN): 33.7 ML/MIN/1.73 M^2
EST. GFR  (NON AFRICAN AMERICAN): 22.1 ML/MIN/1.73 M^2
EST. GFR  (NON AFRICAN AMERICAN): 23.2 ML/MIN/1.73 M^2
EST. GFR  (NON AFRICAN AMERICAN): 29.3 ML/MIN/1.73 M^2
EST. GFR  (NON AFRICAN AMERICAN): 29.3 ML/MIN/1.73 M^2
GLUCOSE SERPL-MCNC: 100 MG/DL (ref 70–110)
GLUCOSE SERPL-MCNC: 133 MG/DL (ref 70–110)
GLUCOSE SERPL-MCNC: 444 MG/DL (ref 70–110)
GLUCOSE SERPL-MCNC: 662 MG/DL (ref 70–110)
HCT VFR BLD AUTO: 27.4 % (ref 37–48.5)
HCT VFR BLD AUTO: 28.3 % (ref 37–48.5)
HGB BLD-MCNC: 8.7 G/DL (ref 12–16)
HGB BLD-MCNC: 9.1 G/DL (ref 12–16)
IMM GRANULOCYTES # BLD AUTO: 0.03 K/UL (ref 0–0.04)
IMM GRANULOCYTES NFR BLD AUTO: 0.3 % (ref 0–0.5)
LYMPHOCYTES # BLD AUTO: 1.4 K/UL (ref 1–4.8)
LYMPHOCYTES NFR BLD: 15.7 % (ref 18–48)
MCH RBC QN AUTO: 30.2 PG (ref 27–31)
MCH RBC QN AUTO: 30.4 PG (ref 27–31)
MCHC RBC AUTO-ENTMCNC: 31.8 G/DL (ref 32–36)
MCHC RBC AUTO-ENTMCNC: 32.2 G/DL (ref 32–36)
MCV RBC AUTO: 95 FL (ref 82–98)
MCV RBC AUTO: 95 FL (ref 82–98)
MONOCYTES # BLD AUTO: 1.1 K/UL (ref 0.3–1)
MONOCYTES NFR BLD: 12.1 % (ref 4–15)
NEUTROPHILS # BLD AUTO: 6.4 K/UL (ref 1.8–7.7)
NEUTROPHILS NFR BLD: 71.1 % (ref 38–73)
NRBC BLD-RTO: 0 /100 WBC
PLATELET # BLD AUTO: 263 K/UL (ref 150–450)
PLATELET # BLD AUTO: 293 K/UL (ref 150–450)
PMV BLD AUTO: 10.8 FL (ref 9.2–12.9)
PMV BLD AUTO: 10.8 FL (ref 9.2–12.9)
POCT GLUCOSE: 113 MG/DL (ref 70–110)
POCT GLUCOSE: 167 MG/DL (ref 70–110)
POCT GLUCOSE: 184 MG/DL (ref 70–110)
POCT GLUCOSE: 223 MG/DL (ref 70–110)
POCT GLUCOSE: 257 MG/DL (ref 70–110)
POCT GLUCOSE: 313 MG/DL (ref 70–110)
POCT GLUCOSE: 372 MG/DL (ref 70–110)
POCT GLUCOSE: 462 MG/DL (ref 70–110)
POCT GLUCOSE: 90 MG/DL (ref 70–110)
POCT GLUCOSE: 95 MG/DL (ref 70–110)
POCT GLUCOSE: 97 MG/DL (ref 70–110)
POCT GLUCOSE: >500 MG/DL (ref 70–110)
POTASSIUM BLD-SCNC: 6.1 MMOL/L (ref 3.5–5.1)
POTASSIUM SERPL-SCNC: 4.6 MMOL/L (ref 3.5–5.1)
POTASSIUM SERPL-SCNC: 4.6 MMOL/L (ref 3.5–5.1)
POTASSIUM SERPL-SCNC: 4.9 MMOL/L (ref 3.5–5.1)
POTASSIUM SERPL-SCNC: 4.9 MMOL/L (ref 3.5–5.1)
POTASSIUM SERPL-SCNC: 5.6 MMOL/L (ref 3.5–5.1)
POTASSIUM SERPL-SCNC: 7 MMOL/L (ref 3.5–5.1)
RBC # BLD AUTO: 2.88 M/UL (ref 4–5.4)
RBC # BLD AUTO: 2.99 M/UL (ref 4–5.4)
SAMPLE: ABNORMAL
SITE: ABNORMAL
SODIUM SERPL-SCNC: 130 MMOL/L (ref 136–145)
SODIUM SERPL-SCNC: 130 MMOL/L (ref 136–145)
SODIUM SERPL-SCNC: 133 MMOL/L (ref 136–145)
SODIUM SERPL-SCNC: 136 MMOL/L (ref 136–145)
WBC # BLD AUTO: 8.98 K/UL (ref 3.9–12.7)
WBC # BLD AUTO: 9.03 K/UL (ref 3.9–12.7)

## 2022-03-19 PROCEDURE — 25000003 PHARM REV CODE 250: Performed by: STUDENT IN AN ORGANIZED HEALTH CARE EDUCATION/TRAINING PROGRAM

## 2022-03-19 PROCEDURE — 25000242 PHARM REV CODE 250 ALT 637 W/ HCPCS

## 2022-03-19 PROCEDURE — 85027 COMPLETE CBC AUTOMATED: CPT | Performed by: STUDENT IN AN ORGANIZED HEALTH CARE EDUCATION/TRAINING PROGRAM

## 2022-03-19 PROCEDURE — 99900035 HC TECH TIME PER 15 MIN (STAT)

## 2022-03-19 PROCEDURE — 80048 BASIC METABOLIC PNL TOTAL CA: CPT | Mod: 91

## 2022-03-19 PROCEDURE — 85025 COMPLETE CBC W/AUTO DIFF WBC: CPT

## 2022-03-19 PROCEDURE — C9399 UNCLASSIFIED DRUGS OR BIOLOG: HCPCS | Performed by: NURSE PRACTITIONER

## 2022-03-19 PROCEDURE — 94761 N-INVAS EAR/PLS OXIMETRY MLT: CPT

## 2022-03-19 PROCEDURE — 99231 PR SUBSEQUENT HOSPITAL CARE,LEVL I: ICD-10-PCS | Mod: ,,, | Performed by: ANESTHESIOLOGY

## 2022-03-19 PROCEDURE — 84132 ASSAY OF SERUM POTASSIUM: CPT

## 2022-03-19 PROCEDURE — 80048 BASIC METABOLIC PNL TOTAL CA: CPT | Performed by: STUDENT IN AN ORGANIZED HEALTH CARE EDUCATION/TRAINING PROGRAM

## 2022-03-19 PROCEDURE — 25000003 PHARM REV CODE 250: Performed by: NURSE PRACTITIONER

## 2022-03-19 PROCEDURE — 94799 UNLISTED PULMONARY SVC/PX: CPT

## 2022-03-19 PROCEDURE — 99222 PR INITIAL HOSPITAL CARE,LEVL II: ICD-10-PCS | Mod: ,,, | Performed by: NURSE PRACTITIONER

## 2022-03-19 PROCEDURE — 25000003 PHARM REV CODE 250: Performed by: SURGERY

## 2022-03-19 PROCEDURE — 63600175 PHARM REV CODE 636 W HCPCS

## 2022-03-19 PROCEDURE — 94640 AIRWAY INHALATION TREATMENT: CPT

## 2022-03-19 PROCEDURE — 25000003 PHARM REV CODE 250

## 2022-03-19 PROCEDURE — 99222 1ST HOSP IP/OBS MODERATE 55: CPT | Mod: ,,, | Performed by: NURSE PRACTITIONER

## 2022-03-19 PROCEDURE — 20600001 HC STEP DOWN PRIVATE ROOM

## 2022-03-19 PROCEDURE — 63600175 PHARM REV CODE 636 W HCPCS: Performed by: STUDENT IN AN ORGANIZED HEALTH CARE EDUCATION/TRAINING PROGRAM

## 2022-03-19 PROCEDURE — 99231 SBSQ HOSP IP/OBS SF/LOW 25: CPT | Mod: ,,, | Performed by: ANESTHESIOLOGY

## 2022-03-19 PROCEDURE — 63600175 PHARM REV CODE 636 W HCPCS: Performed by: SURGERY

## 2022-03-19 PROCEDURE — 63600175 PHARM REV CODE 636 W HCPCS: Performed by: NURSE PRACTITIONER

## 2022-03-19 RX ORDER — GLUCAGON 1 MG
1 KIT INJECTION
Status: DISCONTINUED | OUTPATIENT
Start: 2022-03-19 | End: 2022-03-22 | Stop reason: HOSPADM

## 2022-03-19 RX ORDER — AMITRIPTYLINE HYDROCHLORIDE 10 MG/1
10 TABLET, FILM COATED ORAL NIGHTLY PRN
Status: DISCONTINUED | OUTPATIENT
Start: 2022-03-19 | End: 2022-03-22 | Stop reason: HOSPADM

## 2022-03-19 RX ORDER — INSULIN ASPART 100 [IU]/ML
1-10 INJECTION, SOLUTION INTRAVENOUS; SUBCUTANEOUS
Status: DISCONTINUED | OUTPATIENT
Start: 2022-03-19 | End: 2022-03-19

## 2022-03-19 RX ORDER — IBUPROFEN 200 MG
24 TABLET ORAL
Status: DISCONTINUED | OUTPATIENT
Start: 2022-03-19 | End: 2022-03-19

## 2022-03-19 RX ORDER — CLOPIDOGREL BISULFATE 75 MG/1
75 TABLET ORAL DAILY
Status: DISCONTINUED | OUTPATIENT
Start: 2022-03-19 | End: 2022-03-22 | Stop reason: HOSPADM

## 2022-03-19 RX ORDER — IBUPROFEN 200 MG
16 TABLET ORAL
Status: DISCONTINUED | OUTPATIENT
Start: 2022-03-19 | End: 2022-03-19

## 2022-03-19 RX ORDER — PREGABALIN 75 MG/1
75 CAPSULE ORAL NIGHTLY
Status: DISCONTINUED | OUTPATIENT
Start: 2022-03-19 | End: 2022-03-21

## 2022-03-19 RX ORDER — IBUPROFEN 200 MG
24 TABLET ORAL
Status: DISCONTINUED | OUTPATIENT
Start: 2022-03-19 | End: 2022-03-22 | Stop reason: HOSPADM

## 2022-03-19 RX ORDER — ALBUTEROL SULFATE 2.5 MG/.5ML
10 SOLUTION RESPIRATORY (INHALATION) ONCE
Status: COMPLETED | OUTPATIENT
Start: 2022-03-19 | End: 2022-03-19

## 2022-03-19 RX ORDER — INSULIN ASPART 100 [IU]/ML
0-5 INJECTION, SOLUTION INTRAVENOUS; SUBCUTANEOUS
Status: DISCONTINUED | OUTPATIENT
Start: 2022-03-19 | End: 2022-03-22 | Stop reason: HOSPADM

## 2022-03-19 RX ORDER — INSULIN ASPART 100 [IU]/ML
5 INJECTION, SOLUTION INTRAVENOUS; SUBCUTANEOUS
Status: DISCONTINUED | OUTPATIENT
Start: 2022-03-19 | End: 2022-03-20

## 2022-03-19 RX ORDER — IBUPROFEN 200 MG
16 TABLET ORAL
Status: DISCONTINUED | OUTPATIENT
Start: 2022-03-19 | End: 2022-03-22 | Stop reason: HOSPADM

## 2022-03-19 RX ORDER — GLUCAGON 1 MG
1 KIT INJECTION
Status: DISCONTINUED | OUTPATIENT
Start: 2022-03-19 | End: 2022-03-19

## 2022-03-19 RX ADMIN — DEXTROSE 2 G: 50 INJECTION, SOLUTION INTRAVENOUS at 02:03

## 2022-03-19 RX ADMIN — INSULIN ASPART 5 UNITS: 100 INJECTION, SOLUTION INTRAVENOUS; SUBCUTANEOUS at 04:03

## 2022-03-19 RX ADMIN — SODIUM CHLORIDE: 0.9 INJECTION, SOLUTION INTRAVENOUS at 06:03

## 2022-03-19 RX ADMIN — DEXTROSE 2 G: 50 INJECTION, SOLUTION INTRAVENOUS at 10:03

## 2022-03-19 RX ADMIN — ALLOPURINOL 100 MG: 100 TABLET ORAL at 09:03

## 2022-03-19 RX ADMIN — SODIUM BICARBONATE 650 MG: 650 TABLET ORAL at 09:03

## 2022-03-19 RX ADMIN — ACETAMINOPHEN 1000 MG: 500 TABLET ORAL at 09:03

## 2022-03-19 RX ADMIN — OXYCODONE HYDROCHLORIDE 10 MG: 10 TABLET ORAL at 04:03

## 2022-03-19 RX ADMIN — Medication 400 MG: at 09:03

## 2022-03-19 RX ADMIN — SERTRALINE HYDROCHLORIDE 50 MG: 50 TABLET ORAL at 09:03

## 2022-03-19 RX ADMIN — OXYCODONE HYDROCHLORIDE 10 MG: 10 TABLET ORAL at 09:03

## 2022-03-19 RX ADMIN — INSULIN DETEMIR 12 UNITS: 100 INJECTION, SOLUTION SUBCUTANEOUS at 09:03

## 2022-03-19 RX ADMIN — ALBUTEROL SULFATE 10 MG: 2.5 SOLUTION RESPIRATORY (INHALATION) at 01:03

## 2022-03-19 RX ADMIN — HYDROMORPHONE HYDROCHLORIDE 1 MG: 1 INJECTION, SOLUTION INTRAMUSCULAR; INTRAVENOUS; SUBCUTANEOUS at 05:03

## 2022-03-19 RX ADMIN — ACETAMINOPHEN 1000 MG: 500 TABLET ORAL at 04:03

## 2022-03-19 RX ADMIN — HEPARIN SODIUM 5000 UNITS: 5000 INJECTION INTRAVENOUS; SUBCUTANEOUS at 09:03

## 2022-03-19 RX ADMIN — INSULIN HUMAN 1 UNITS/HR: 1 INJECTION, SOLUTION INTRAVENOUS at 12:03

## 2022-03-19 RX ADMIN — INSULIN ASPART 5 UNITS: 100 INJECTION, SOLUTION INTRAVENOUS; SUBCUTANEOUS at 12:03

## 2022-03-19 RX ADMIN — CARVEDILOL 3.12 MG: 3.12 TABLET, FILM COATED ORAL at 09:03

## 2022-03-19 RX ADMIN — CALCIUM GLUCONATE 1 G: 94 INJECTION, SOLUTION INTRAVENOUS at 12:03

## 2022-03-19 RX ADMIN — INSULIN ASPART 5 UNITS: 100 INJECTION, SOLUTION INTRAVENOUS; SUBCUTANEOUS at 01:03

## 2022-03-19 RX ADMIN — HEPARIN SODIUM 5000 UNITS: 5000 INJECTION INTRAVENOUS; SUBCUTANEOUS at 05:03

## 2022-03-19 RX ADMIN — ATORVASTATIN CALCIUM 80 MG: 20 TABLET, FILM COATED ORAL at 09:03

## 2022-03-19 RX ADMIN — INSULIN ASPART 5 UNITS: 100 INJECTION, SOLUTION INTRAVENOUS; SUBCUTANEOUS at 11:03

## 2022-03-19 RX ADMIN — FUROSEMIDE 40 MG: 40 TABLET ORAL at 09:03

## 2022-03-19 RX ADMIN — ACETAMINOPHEN 1000 MG: 500 TABLET ORAL at 05:03

## 2022-03-19 RX ADMIN — HEPARIN SODIUM 5000 UNITS: 5000 INJECTION INTRAVENOUS; SUBCUTANEOUS at 04:03

## 2022-03-19 RX ADMIN — AMITRIPTYLINE HYDROCHLORIDE 10 MG: 10 TABLET, FILM COATED ORAL at 09:03

## 2022-03-19 RX ADMIN — NIFEDIPINE 30 MG: 30 TABLET, FILM COATED, EXTENDED RELEASE ORAL at 09:03

## 2022-03-19 RX ADMIN — OXYCODONE HYDROCHLORIDE 10 MG: 10 TABLET ORAL at 03:03

## 2022-03-19 RX ADMIN — SODIUM CHLORIDE: 0.9 INJECTION, SOLUTION INTRAVENOUS at 09:03

## 2022-03-19 RX ADMIN — SODIUM ZIRCONIUM CYCLOSILICATE 10 G: 5 POWDER, FOR SUSPENSION ORAL at 02:03

## 2022-03-19 RX ADMIN — PREGABALIN 75 MG: 75 CAPSULE ORAL at 09:03

## 2022-03-19 RX ADMIN — MIRTAZAPINE 15 MG: 15 TABLET, FILM COATED ORAL at 09:03

## 2022-03-19 RX ADMIN — CLOPIDOGREL 75 MG: 75 TABLET, FILM COATED ORAL at 10:03

## 2022-03-19 NOTE — ASSESSMENT & PLAN NOTE
Endocrinology consulted for BG management.   BG goal 140-180    20% reduction in most recent regimen started by Dr. Trino Valdez Flex Pen 12 units daily  - Novolog (aspart) insulin 5 Units SQ TIDWM and prn for BG excursions /50 SSI  - BG checks AC/HS      ** Please notify Endocrine for any change and/or advance in diet**  ** Please call Endocrine for any BG related issues **    Discharge Planning:   TBD. Please notify endocrinology prior to discharge.

## 2022-03-19 NOTE — ANESTHESIA POST-OP PAIN MANAGEMENT
Acute Pain Service Progress Note    Suyapa Connelly is a 56 y.o., female, [id number].    Surgery:  AMPUTATION, ABOVE KNEE (Right Leg Upper)    Post Op Day #: 1    Catheter type: perineural  femoral    Infusion type: Ropivacaine 0.2%  10 mL/hr basal w/ 5 mL/q30m DB    Problem List:  There are no hospital problems to display for this patient.      Subjective:     General appearance of alert, oriented, no complaints   Pain with rest: 1    Faces   Pain with movement: 1    Faces   Side Effects    1. Pruritis No    2. Nausea No    3. Motor Blockade No, 0=Ability to raise lower extremities off bed    4. Sedation No, 1=awake and alert    Objective:   Catheter site clean, dry, intact      Vitals   Vitals:    03/19/22 0936   BP: (!) 124/56   Pulse: 90   Resp: 16   Temp:         Labs    Admission on 03/18/2022   Component Date Value Ref Range Status    Group & Rh 03/18/2022 O POS   Final    Indirect Octaviano 03/18/2022 NEG   Final    WBC 03/18/2022 8.03  3.90 - 12.70 K/uL Final    RBC 03/18/2022 2.91 (A) 4.00 - 5.40 M/uL Final    Hemoglobin 03/18/2022 8.7 (A) 12.0 - 16.0 g/dL Final    Hematocrit 03/18/2022 28.2 (A) 37.0 - 48.5 % Final    MCV 03/18/2022 97  82 - 98 fL Final    MCH 03/18/2022 29.9  27.0 - 31.0 pg Final    MCHC 03/18/2022 30.9 (A) 32.0 - 36.0 g/dL Final    RDW 03/18/2022 16.5 (A) 11.5 - 14.5 % Final    Platelets 03/18/2022 445  150 - 450 K/uL Final    MPV 03/18/2022 10.6  9.2 - 12.9 fL Final    Immature Granulocytes 03/18/2022 0.2  0.0 - 0.5 % Final    Gran # (ANC) 03/18/2022 4.8  1.8 - 7.7 K/uL Final    Immature Grans (Abs) 03/18/2022 0.02  0.00 - 0.04 K/uL Final    Lymph # 03/18/2022 1.9  1.0 - 4.8 K/uL Final    Mono # 03/18/2022 0.8  0.3 - 1.0 K/uL Final    Eos # 03/18/2022 0.5  0.0 - 0.5 K/uL Final    Baso # 03/18/2022 0.05  0.00 - 0.20 K/uL Final    nRBC 03/18/2022 0  0 /100 WBC Final    Gran % 03/18/2022 59.7  38.0 - 73.0 % Final    Lymph % 03/18/2022 23.9  18.0 - 48.0 % Final    Mono %  03/18/2022 10.0  4.0 - 15.0 % Final    Eosinophil % 03/18/2022 5.6  0.0 - 8.0 % Final    Basophil % 03/18/2022 0.6  0.0 - 1.9 % Final    Differential Method 03/18/2022 Automated   Final    POCT Glucose 03/18/2022 181 (A) 70 - 110 mg/dL Final    UNIT NUMBER 03/18/2022 [id number]   Final    Product Code 03/18/2022 [id number]   Final    DISPENSE STATUS 03/18/2022 TRANSFUSED   Final    CODING SYSTEM 03/18/2022 [id number]   Final    Unit Blood Type Code 03/18/2022 5100   Final    Unit Blood Type 03/18/2022 O POS   Final    Unit Expiration 03/18/2022 [id number]   Final    Sodium 03/18/2022 136  136 - 145 mmol/L Final    Potassium 03/18/2022 5.8 (A) 3.5 - 5.1 mmol/L Final    Chloride 03/18/2022 107  95 - 110 mmol/L Final    CO2 03/18/2022 21 (A) 23 - 29 mmol/L Final    Glucose 03/18/2022 245 (A) 70 - 110 mg/dL Final    BUN 03/18/2022 60 (A) 6 - 20 mg/dL Final    Creatinine 03/18/2022 2.3 (A) 0.5 - 1.4 mg/dL Final    Calcium 03/18/2022 8.6 (A) 8.7 - 10.5 mg/dL Final    Anion Gap 03/18/2022 8  8 - 16 mmol/L Final    eGFR if  03/18/2022 26.8 (A) >60 mL/min/1.73 m^2 Final    eGFR if non  03/18/2022 23.2 (A) >60 mL/min/1.73 m^2 Final    WBC 03/18/2022 12.67  3.90 - 12.70 K/uL Final    RBC 03/18/2022 2.09 (A) 4.00 - 5.40 M/uL Final    Hemoglobin 03/18/2022 6.5 (A) 12.0 - 16.0 g/dL Final    Hematocrit 03/18/2022 20.3 (A) 37.0 - 48.5 % Final    MCV 03/18/2022 97  82 - 98 fL Final    MCH 03/18/2022 31.1 (A) 27.0 - 31.0 pg Final    MCHC 03/18/2022 32.0  32.0 - 36.0 g/dL Final    RDW 03/18/2022 15.7 (A) 11.5 - 14.5 % Final    Platelets 03/18/2022 383  150 - 450 K/uL Final    MPV 03/18/2022 11.0  9.2 - 12.9 fL Final    Hemoglobin A1C 03/18/2022 7.0 (A) 4.0 - 5.6 % Final    Estimated Avg Glucose 03/18/2022 154 (A) 68 - 131 mg/dL Final    UNIT NUMBER 03/18/2022 [id number]   Final    Product Code 03/18/2022 [id number]   Final    DISPENSE STATUS 03/18/2022 TRANSFUSED    Final    CODING SYSTEM 03/18/2022 [id number]   Final    Unit Blood Type Code 03/18/2022 5100   Final    Unit Blood Type 03/18/2022 O POS   Final    Unit Expiration 03/18/2022 [id number]   Final    UNIT NUMBER 03/18/2022 [id number]   Final    Product Code 03/18/2022 [id number]   Final    DISPENSE STATUS 03/18/2022 TRANSFUSED   Final    CODING SYSTEM 03/18/2022 [id number]   Final    Unit Blood Type Code 03/18/2022 5100   Final    Unit Blood Type 03/18/2022 O POS   Final    Unit Expiration 03/18/2022 [id number]   Final    Sodium 03/18/2022 130 (A) 136 - 145 mmol/L Final    Potassium 03/18/2022 7.0 (A) 3.5 - 5.1 mmol/L Final    Chloride 03/18/2022 102  95 - 110 mmol/L Final    CO2 03/18/2022 19 (A) 23 - 29 mmol/L Final    Glucose 03/18/2022 662 (A) 70 - 110 mg/dL Final    BUN 03/18/2022 55 (A) 6 - 20 mg/dL Final    Creatinine 03/18/2022 2.3 (A) 0.5 - 1.4 mg/dL Final    Calcium 03/18/2022 8.0 (A) 8.7 - 10.5 mg/dL Final    Anion Gap 03/18/2022 9  8 - 16 mmol/L Final    eGFR if  03/18/2022 26.8 (A) >60 mL/min/1.73 m^2 Final    eGFR if non  03/18/2022 23.2 (A) >60 mL/min/1.73 m^2 Final    WBC 03/19/2022 8.98  3.90 - 12.70 K/uL Final    RBC 03/19/2022 2.88 (A) 4.00 - 5.40 M/uL Final    Hemoglobin 03/19/2022 8.7 (A) 12.0 - 16.0 g/dL Final    Hematocrit 03/19/2022 27.4 (A) 37.0 - 48.5 % Final    MCV 03/19/2022 95  82 - 98 fL Final    MCH 03/19/2022 30.2  27.0 - 31.0 pg Final    MCHC 03/19/2022 31.8 (A) 32.0 - 36.0 g/dL Final    RDW 03/19/2022 15.0 (A) 11.5 - 14.5 % Final    Platelets 03/19/2022 263  150 - 450 K/uL Final    MPV 03/19/2022 10.8  9.2 - 12.9 fL Final    Immature Granulocytes 03/19/2022 0.3  0.0 - 0.5 % Final    Gran # (ANC) 03/19/2022 6.4  1.8 - 7.7 K/uL Final    Immature Grans (Abs) 03/19/2022 0.03  0.00 - 0.04 K/uL Final    Lymph # 03/19/2022 1.4  1.0 - 4.8 K/uL Final    Mono # 03/19/2022 1.1 (A) 0.3 - 1.0 K/uL Final    Eos # 03/19/2022 0.0   0.0 - 0.5 K/uL Final    Baso # 03/19/2022 0.04  0.00 - 0.20 K/uL Final    nRBC 03/19/2022 0  0 /100 WBC Final    Gran % 03/19/2022 71.1  38.0 - 73.0 % Final    Lymph % 03/19/2022 15.7 (A) 18.0 - 48.0 % Final    Mono % 03/19/2022 12.1  4.0 - 15.0 % Final    Eosinophil % 03/19/2022 0.4  0.0 - 8.0 % Final    Basophil % 03/19/2022 0.4  0.0 - 1.9 % Final    Differential Method 03/19/2022 Automated   Final    POCT Glucose 03/18/2022 >500 (A) 70 - 110 mg/dL Final    POCT Glucose 03/18/2022 >500 (A) 70 - 110 mg/dL Final    Potassium 03/19/2022 4.9  3.5 - 5.1 mmol/L Final    Sodium 03/19/2022 136  136 - 145 mmol/L Final    Potassium 03/19/2022 4.9  3.5 - 5.1 mmol/L Final    Chloride 03/19/2022 106  95 - 110 mmol/L Final    CO2 03/19/2022 21 (A) 23 - 29 mmol/L Final    Glucose 03/19/2022 133 (A) 70 - 110 mg/dL Final    BUN 03/19/2022 58 (A) 6 - 20 mg/dL Final    Creatinine 03/19/2022 1.9 (A) 0.5 - 1.4 mg/dL Final    Calcium 03/19/2022 8.1 (A) 8.7 - 10.5 mg/dL Final    Anion Gap 03/19/2022 9  8 - 16 mmol/L Final    eGFR if  03/19/2022 33.7 (A) >60 mL/min/1.73 m^2 Final    eGFR if non African American 03/19/2022 29.3 (A) >60 mL/min/1.73 m^2 Final    POCT Glucose 03/18/2022 >500 (A) 70 - 110 mg/dL Final    POCT Glucose 03/19/2022 >500 (A) 70 - 110 mg/dL Final    Sodium 03/19/2022 130 (A) 136 - 145 mmol/L Final    Potassium 03/19/2022 5.6 (A) 3.5 - 5.1 mmol/L Final    Chloride 03/19/2022 100  95 - 110 mmol/L Final    CO2 03/19/2022 20 (A) 23 - 29 mmol/L Final    Glucose 03/19/2022 444 (A) 70 - 110 mg/dL Final    BUN 03/19/2022 58 (A) 6 - 20 mg/dL Final    Creatinine 03/19/2022 2.4 (A) 0.5 - 1.4 mg/dL Final    Calcium 03/19/2022 8.0 (A) 8.7 - 10.5 mg/dL Final    Anion Gap 03/19/2022 10  8 - 16 mmol/L Final    eGFR if  03/19/2022 25.4 (A) >60 mL/min/1.73 m^2 Final    eGFR if non African American 03/19/2022 22.1 (A) >60 mL/min/1.73 m^2 Final    WBC 03/19/2022 9.03   3.90 - 12.70 K/uL Final    RBC 03/19/2022 2.99 (A) 4.00 - 5.40 M/uL Final    Hemoglobin 03/19/2022 9.1 (A) 12.0 - 16.0 g/dL Final    Hematocrit 03/19/2022 28.3 (A) 37.0 - 48.5 % Final    MCV 03/19/2022 95  82 - 98 fL Final    MCH 03/19/2022 30.4  27.0 - 31.0 pg Final    MCHC 03/19/2022 32.2  32.0 - 36.0 g/dL Final    RDW 03/19/2022 15.1 (A) 11.5 - 14.5 % Final    Platelets 03/19/2022 293  150 - 450 K/uL Final    MPV 03/19/2022 10.8  9.2 - 12.9 fL Final    POCT Glucose 03/19/2022 >500 (A) 70 - 110 mg/dL Final    POC Potassium 03/19/2022 6.1 (A) 3.5 - 5.1 mmol/L Final    Sample 03/19/2022 VENOUS   Final    Site 03/19/2022 Other   Final    Allens Test 03/19/2022 N/A   Final    POCT Glucose 03/19/2022 >500 (A) 70 - 110 mg/dL Final    POCT Glucose 03/19/2022 462 (A) 70 - 110 mg/dL Final    POCT Glucose 03/18/2022 223 (A) 70 - 110 mg/dL Final    POCT Glucose 03/19/2022 372 (A) 70 - 110 mg/dL Final    POCT Glucose 03/19/2022 313 (A) 70 - 110 mg/dL Final    POCT Glucose 03/19/2022 257 (A) 70 - 110 mg/dL Final    POCT Glucose 03/19/2022 167 (A) 70 - 110 mg/dL Final    POCT Glucose 03/19/2022 97  70 - 110 mg/dL Final    POCT Glucose 03/19/2022 90  70 - 110 mg/dL Final        Meds   Current Facility-Administered Medications   Medication Dose Route Frequency Provider Last Rate Last Admin    0.9%  NaCl infusion (for blood administration)   Intravenous Q24H PRN Godwin Ramires MD        0.9%  NaCl infusion   Intravenous Continuous Rizwan Myles  mL/hr at 03/19/22 0921 New Bag at 03/19/22 0921    acetaminophen tablet 1,000 mg  1,000 mg Oral Q8H Rizwan Myles MD   1,000 mg at 03/19/22 0556    allopurinoL tablet 100 mg  100 mg Oral Daily Rizwan Myles MD   100 mg at 03/19/22 0940    amitriptyline tablet 10 mg  10 mg Oral Nightly PRN David Kelley MD        atorvastatin tablet 80 mg  80 mg Oral QHS Rizwan Myles MD   80 mg at 03/18/22 2152    carvediloL tablet 3.125 mg  3.125 mg  Oral BID Rizwan Myles MD   3.125 mg at 03/19/22 0941    ceFAZolin 2 gram in dextrose 5% 100 mL IVPB (premix)  2 g Intravenous Q8H G. Zbigniew Florez II, MD   Stopped at 03/19/22 0323    dextrose 10% bolus 125 mL  12.5 g Intravenous PRN GRICEL. Zbigniew Florez II, MD        dextrose 10% bolus 250 mL  25 g Intravenous PRN GRICEL. Zbigniew Florez II, MD        furosemide tablet 40 mg  40 mg Oral Daily Rizwan Myles MD   40 mg at 03/19/22 0942    glucagon (human recombinant) injection 1 mg  1 mg Intramuscular PRN Jacinto Brownlee DNP, FNP        glucose chewable tablet 16 g  16 g Oral PRN Jacinto Brownlee DNP, FNP        glucose chewable tablet 24 g  24 g Oral PRN Jacinto Brownlee DNP, FNP        heparin (porcine) injection 5,000 Units  5,000 Units Subcutaneous Q8H Rizwan Myles MD   5,000 Units at 03/19/22 0558    HYDROmorphone injection 1 mg  1 mg Intravenous Q4H PRN Rizwan Myles MD        insulin aspart U-100 pen 0-5 Units  0-5 Units Subcutaneous QID (AC + HS) PRN Jacinto Brownlee DNP, FNP        insulin aspart U-100 pen 5 Units  5 Units Subcutaneous TIDWM Jacinto Brownlee DNP, FNP        insulin detemir U-100 pen 12 Units  12 Units Subcutaneous Daily Jacinto Brownlee DNP, FNP   12 Units at 03/19/22 0943    magnesium oxide tablet 400 mg  400 mg Oral BID Rizwan Myles MD   400 mg at 03/19/22 0942    melatonin tablet 6 mg  6 mg Oral Nightly PRN Rizwan Myles MD   6 mg at 03/18/22 2152    mirtazapine tablet 15 mg  15 mg Oral Nightly Rizwan Myles MD   15 mg at 03/18/22 2152    NIFEdipine 24 hr tablet 30 mg  30 mg Oral Daily Rizwan Myles MD   30 mg at 03/19/22 0942    ondansetron injection 4 mg  4 mg Intravenous Q12H PRN Rizwan Myles MD        oxyCODONE immediate release tablet Tab 10 mg  10 mg Oral Q4H PRN Rizwan Myles MD   10 mg at 03/19/22 0359    pregabalin capsule 75 mg  75 mg Oral QHS David Kelley MD        ROPIvacaine (PF) 2 mg/ml (0.2%) solution    Perineural Continuous Sofia Iyer MD   200 mL at 03/18/22 1806    sertraline tablet 50 mg  50 mg Oral Daily Rizwan Myles MD   50 mg at 03/19/22 0943    sodium bicarbonate tablet 650 mg  650 mg Oral BID Rizwan Myles MD   650 mg at 03/19/22 0943    sodium chloride 0.9% flush 3 mL  3 mL Intravenous PRN Rizwan Myles MD           Assessment:     Pain control adequate    Plan:   - Continue femoral PNC at current rate   - Continue scheduled Tylenol   - Continue scheduled oxycodone 10mg PRN   - Add Lyrica 75mg QHS   - Add PRN elavil 10 mg nightly    David Kelley MD PGY-4  Anesthesiology  [facility], [place]

## 2022-03-19 NOTE — HPI
Reason for Consult: Management of T2DM, Hyperglycemia      Surgical Procedure and Date: AMPUTATION, ABOVE KNEE (Right)     Diabetes diagnosis year: 2006     Home Diabetes Medications:   Lantus 10 units nightly   Novolog 5 units before meals - (give this 10-15 minutes before you start eating)  Sliding Scale (second box on the log)  If your blood sugar is:  150-199             give extra 1 units of insulin to yourself.  200-249                             2 units  250-299                              3 units  300-349                             4 units  350-399                             5 units  >400                                  6 units        How often checking glucose at home? 1-3 x day   BG readings on regimen: 100's  Hypoglycemia on the regimen?  No  Missed doses on regimen?  No     Diabetes Complications include:     Hyperglycemia, Foot ulcer   and Other skin ulcer     Complicating diabetes co morbidities:   CKD, PVD, Osteomyelitis, HTN, HLD    Hemoglobin A1C   Date Value Ref Range Status   03/18/2022 7.0 (H) 4.0 - 5.6 % Final     Comment:     ADA Screening Guidelines:  5.7-6.4%  Consistent with prediabetes  >or=6.5%  Consistent with diabetes    High levels of fetal hemoglobin interfere with the HbA1C  assay. Heterozygous hemoglobin variants (HbS, HgC, etc)do  not significantly interfere with this assay.   However, presence of multiple variants may affect accuracy.     12/03/2021 9.6 (H) 4.0 - 5.6 % Final     Comment:     ADA Screening Guidelines:  5.7-6.4%  Consistent with prediabetes  >or=6.5%  Consistent with diabetes    High levels of fetal hemoglobin interfere with the HbA1C  assay. Heterozygous hemoglobin variants (HbS, HgC, etc)do  not significantly interfere with this assay.   However, presence of multiple variants may affect accuracy.     06/18/2021 5.9 (H) 4.0 - 5.6 % Final     Comment:     ADA Screening Guidelines:  5.7-6.4%  Consistent with prediabetes  >or=6.5%  Consistent with diabetes    High  levels of fetal hemoglobin interfere with the HbA1C  assay. Heterozygous hemoglobin variants (HbS, HgC, etc)do  not significantly interfere with this assay.   However, presence of multiple variants may affect accuracy.             HPI: 57 yo F with multiple medical co-morbidities and an extensive past surgical history most significant for prior L AKA and recent R BKA with subsequent revision for poor healing ([place]- Jan 2022) both secondary to PAD. Patient is currently admitted to the hospital medicine service with osteomyelitis of the R BKA stump with break down of the wound/poor healing. ID is following for antibiotic recommendations. Vascular Surgery was consulted for evaluation of the extremity for possible revision to AKA given the ongoing infection and poor healing. Patient would like to have procedure with our vascular team rather than following up in [place] with her previous surgeon.  Endocrine consulted for hyperglycemia and type 2 diabetes.

## 2022-03-19 NOTE — ASSESSMENT & PLAN NOTE
Infection may elevate BG readings  On IV antibiotics  Managed per primary team  Avoid hypoglycemia

## 2022-03-19 NOTE — CONSULTS
[facility] - [facility]  Endocrinology  Diabetes Consult Note    Consult Requested by: DAYNE Florez II, MD   Reason for admit: <principal problem not specified>    HISTORY OF PRESENT ILLNESS:  Reason for Consult: Management of T2DM, Hyperglycemia      Surgical Procedure and Date: AMPUTATION, ABOVE KNEE (Right)     Diabetes diagnosis year: 2006     Home Diabetes Medications:   Lantus 18 units nightly   Novolog 6 units before meals - (give this 10-15 minutes before you start eating)  Sliding Scale (second box on the log)  If your blood sugar is:  150-199             give extra 1 units of insulin to yourself.  200-249                             2 units  250-299                              3 units  300-349                             4 units  350-399                             5 units  >400                                  6 units        How often checking glucose at home? 1-3 x day   BG readings on regimen: 100's  Hypoglycemia on the regimen?  No  Missed doses on regimen?  No     Diabetes Complications include:     Hyperglycemia, Foot ulcer   and Other skin ulcer     Complicating diabetes co morbidities:   CKD, PVD, Osteomyelitis, HTN, HLD    Hemoglobin A1C   Date Value Ref Range Status   03/18/2022 7.0 (H) 4.0 - 5.6 % Final     Comment:     ADA Screening Guidelines:  5.7-6.4%  Consistent with prediabetes  >or=6.5%  Consistent with diabetes    High levels of fetal hemoglobin interfere with the HbA1C  assay. Heterozygous hemoglobin variants (HbS, HgC, etc)do  not significantly interfere with this assay.   However, presence of multiple variants may affect accuracy.     12/03/2021 9.6 (H) 4.0 - 5.6 % Final     Comment:     ADA Screening Guidelines:  5.7-6.4%  Consistent with prediabetes  >or=6.5%  Consistent with diabetes    High levels of fetal hemoglobin interfere with the HbA1C  assay. Heterozygous hemoglobin variants (HbS, HgC, etc)do  not significantly interfere with this assay.   However, presence of multiple  variants may affect accuracy.     2021 5.9 (H) 4.0 - 5.6 % Final     Comment:     ADA Screening Guidelines:  5.7-6.4%  Consistent with prediabetes  >or=6.5%  Consistent with diabetes    High levels of fetal hemoglobin interfere with the HbA1C  assay. Heterozygous hemoglobin variants (HbS, HgC, etc)do  not significantly interfere with this assay.   However, presence of multiple variants may affect accuracy.             HPI: 57 yo F with multiple medical co-morbidities and an extensive past surgical history most significant for prior L AKA and recent R BKA with subsequent revision for poor healing (- 2022) both secondary to PAD. Patient is currently admitted to the hospital medicine service with osteomyelitis of the R BKA stump with break down of the wound/poor healing. ID is following for antibiotic recommendations. Vascular Surgery was consulted for evaluation of the extremity for possible revision to AKA given the ongoing infection and poor healing. Patient would like to have procedure with our vascular team rather than following up in [place] with her previous surgeon.  Endocrine consulted for hyperglycemia and type 2 diabetes.       Interval HPI:   Overnight events: No acute events overnight. Patient on the [facility] in room 1023/1023 A. Blood glucose stable. BG at goal on current insulin regimen (SSI, prandial, and basal insulin ). Steroid use- None. 1 Day Post-Op  Renal function- Abnormal - Creatinine 1.9   Vasopressors-  None       Diet diabetic [facility]; 2000 Calorie; Isolation Tray - Regular [place]     Eatin%  Nausea: No  Hypoglycemia and intervention: No  Fever: No  TPN and/or TF: No      PMH, PSH, FH, SH updated and reviewed     ROS:  Review of Systems    Constitutional: Positive for weight changes.  Eyes: Negative for visual disturbance.  Respiratory: Negative for cough.   Cardiovascular: Negative for chest pain.  Gastrointestinal: Negative for nausea.  Endocrine: Negative for  polyuria, polydipsia.  Musculoskeletal: Negative for back pain.  Skin: Negative for rash.  Neurological: Negative for syncope.  Psychiatric/Behavioral: Negative for depression.    Current Medications and/or Treatments Impacting Glycemic Control  Immunotherapy:    Immunosuppressants       None          Steroids:   Hormones (From admission, onward)                Start     Stop Route Frequency Ordered    03/18/22 1741  melatonin tablet 6 mg         -- Oral Nightly PRN 03/18/22 1647          Pressors:    Autonomic Drugs (From admission, onward)                None          Hyperglycemia/Diabetes Medications:   Antihyperglycemics (From admission, onward)                Start     Stop Route Frequency Ordered    03/19/22 1130  insulin aspart U-100 pen 5 Units         -- SubQ 3 times daily with meals 03/19/22 0918    03/19/22 1030  insulin detemir U-100 pen 12 Units         -- SubQ Daily 03/19/22 0918    03/19/22 1018  insulin aspart U-100 pen 0-5 Units         -- SubQ Before meals & nightly PRN 03/19/22 0918             PHYSICAL EXAMINATION:  Vitals:    03/19/22 1128   BP: 123/60   Pulse: 88   Resp: 18   Temp: 97.6 °F (36.4 °C)     Body mass index is 23.96 kg/m².    Physical Exam  Constitutional: Well developed, well nourished, NAD.  ENT: External ears no masses with nose patent; normal hearing.  Neck: Supple; trachea midline.  Cardiovascular: Normal heart sounds, no LE edema. DP +2 bilaterally.  Lungs: Normal effort; lungs anterior bilaterally clear to auscultation.  Abdomen: Soft, no masses, no hernias.  MS: No clubbing or cyanosis of nails noted; unable to assess gait.  Skin: No rashes, lesions, or ulcers; no nodules. Injection sites are ok. No lipo hypertropthy or atrophy.  Psychiatric: Good judgement and insight; normal mood and affect.  Neurological: Cranial nerves are grossly intact.   Foot: Nails in good condition, BLE amputations noted.        Labs Reviewed and Include   Recent Labs   Lab 03/19/22  1206       CALCIUM 8.4*   *   K 4.6  4.6   CO2 23      BUN 53*   CREATININE 1.9*     Lab Results   Component Value Date    WBC 8.98 03/19/2022    HGB 8.7 (L) 03/19/2022    HCT 27.4 (L) 03/19/2022    MCV 95 03/19/2022     03/19/2022     No results for input(s): TSH, FREET4 in the last 168 hours.  Lab Results   Component Value Date    HGBA1C 7.0 (H) 03/18/2022       Nutritional status:   Body mass index is 23.96 kg/m².  Lab Results   Component Value Date    ALBUMIN 2.2 (L) 03/16/2022    ALBUMIN 2.2 (L) 03/15/2022    ALBUMIN 2.0 (L) 03/14/2022     Lab Results   Component Value Date    PREALBUMIN 10 (L) 02/03/2022    PREALBUMIN 8 (L) 06/07/2021    PREALBUMIN 6 (L) 05/31/2021       Estimated Creatinine Clearance: 30.1 mL/min (A) (based on SCr of 1.9 mg/dL (H)).    Accu-Checks  Recent Labs     03/19/22  0144 03/19/22  0246 03/19/22  0348 03/19/22  0447 03/19/22  0555 03/19/22  0652 03/19/22  0756 03/19/22  0907 03/19/22  0927 03/19/22  1126   POCTGLUCOSE >500* >500* 462* 372* 313* 257* 167* 97 90 113*        ASSESSMENT and PLAN    Type 2 diabetes mellitus with hyperglycemia, with long-term current use of insulin  Endocrinology consulted for BG management.   BG goal 140-180    20% reduction in most recent regimen started by Dr. Trion Valdez Flex Pen 12 units daily  - Novolog (aspart) insulin 5 Units SQ TIDWM and prn for BG excursions /50 SSI  - BG checks AC/HS      ** Please notify Endocrine for any change and/or advance in diet**  ** Please call Endocrine for any BG related issues **    Discharge Planning:   TBD. Please notify endocrinology prior to discharge.        Infection of below knee amputation stump  Infection may elevate BG readings  On IV antibiotics  Managed per primary team  Avoid hypoglycemia        Surgical wound present  Optimize BG control to improve wound healing            Plan discussed with patient, family, and RN at bedside.        Jacinto Brownlee DNP,  KODAKP  Endocrinology  [facility]

## 2022-03-19 NOTE — CARE UPDATE
BG goal 140 -1 80     Consult acknowledged. Expect full consult note to follow. Chart reviewed, and orders placed.     - Start intensive IV insulin infusion protocol. Initial rate starting at 1 unit/hr.   - BG monitoring every 1 hour.     2:31 AM  - Discontinue Moderate Dose SQ Insulin Correction Scale.  - Insulin stacking noted. Patient not to receive SQ insulin correction while on intensive IV insulin infusion protocol.     ** Please call Endocrine for any BG related issues **  ** Please notify Endocrine for any change and/or advance in diet**  Lab Results   Component Value Date    HGBA1C 7.0 (H) 03/18/2022     Discharge Planning:   TBD. Please notify endocrinology prior to discharge.

## 2022-03-19 NOTE — SUBJECTIVE & OBJECTIVE
Interval HPI:   Overnight events: No acute events overnight. Patient on the [facility] in room 1023/1023 A. Blood glucose stable. BG at goal on current insulin regimen (SSI, prandial, and basal insulin ). Steroid use- None. 1 Day Post-Op  Renal function- Abnormal - Creatinine 1.9   Vasopressors-  None       Diet diabetic [facility]; 2000 Calorie; Isolation Tray - Regular [place]     Eatin%  Nausea: No  Hypoglycemia and intervention: No  Fever: No  TPN and/or TF: No      PMH, PSH, FH, SH updated and reviewed     ROS:  Review of Systems    Constitutional: Positive for weight changes.  Eyes: Negative for visual disturbance.  Respiratory: Negative for cough.   Cardiovascular: Negative for chest pain.  Gastrointestinal: Negative for nausea.  Endocrine: Negative for polyuria, polydipsia.  Musculoskeletal: Negative for back pain.  Skin: Negative for rash.  Neurological: Negative for syncope.  Psychiatric/Behavioral: Negative for depression.    Current Medications and/or Treatments Impacting Glycemic Control  Immunotherapy:    Immunosuppressants       None          Steroids:   Hormones (From admission, onward)                Start     Stop Route Frequency Ordered    22 1741  melatonin tablet 6 mg         -- Oral Nightly PRN 22 1647          Pressors:    Autonomic Drugs (From admission, onward)                None          Hyperglycemia/Diabetes Medications:   Antihyperglycemics (From admission, onward)                Start     Stop Route Frequency Ordered    22 1130  insulin aspart U-100 pen 5 Units         -- SubQ 3 times daily with meals 22 0918    22 1030  insulin detemir U-100 pen 12 Units         -- SubQ Daily 22 0918    22 1018  insulin aspart U-100 pen 0-5 Units         -- SubQ Before meals & nightly PRN 22 0918             PHYSICAL EXAMINATION:  Vitals:    22 1128   BP: 123/60   Pulse: 88   Resp: 18   Temp: 97.6 °F (36.4 °C)     Body mass index is 23.96  kg/m².    Physical Exam  Constitutional: Well developed, well nourished, NAD.  ENT: External ears no masses with nose patent; normal hearing.  Neck: Supple; trachea midline.  Cardiovascular: Normal heart sounds, no LE edema. DP +2 bilaterally.  Lungs: Normal effort; lungs anterior bilaterally clear to auscultation.  Abdomen: Soft, no masses, no hernias.  MS: No clubbing or cyanosis of nails noted; unable to assess gait.  Skin: No rashes, lesions, or ulcers; no nodules. Injection sites are ok. No lipo hypertropthy or atrophy.  Psychiatric: Good judgement and insight; normal mood and affect.  Neurological: Cranial nerves are grossly intact.   Foot: Nails in good condition, BLE amputations noted.

## 2022-03-19 NOTE — PLAN OF CARE
Plan of care reviewed with pt. Verbalized understanding. Pt AAOx4. VS stable on RA. Pain managed with PRN medicine.     - RLE incision w/ gauze and ace wrap. CDI.  - RLE DALIA intact putting out sanguinous drainage.  - Pt received 2 units PRBCs. H&H currently 9.1 & 28.3.   - Pt hyperkalemic and hyperglycemic overnight. Pt started on insulin w/ q1hr accucecks. (See previous.) BS and potassium down trending. Q4hr potassium labs done.  - Pt voids per purewick, adequate output.   - MIVF infusing per MAR.     Pt CLD no sugar diet. No complaints of nausea. Frequent rounds made for pt safety. Bed low and locked, call light in reach. Will continue to manage POC.           Problem: Infection  Goal: Absence of Infection Signs and Symptoms  Outcome: Ongoing, Progressing     Problem: Adjustment to Illness (Sepsis/Septic Shock)  Goal: Optimal Coping  Outcome: Ongoing, Progressing     Problem: Bleeding (Sepsis/Septic Shock)  Goal: Absence of Bleeding  Outcome: Ongoing, Progressing     Problem: Glycemic Control Impaired (Sepsis/Septic Shock)  Goal: Blood Glucose Level Within Desired Range  Outcome: Ongoing, Progressing     Problem: Infection Progression (Sepsis/Septic Shock)  Goal: Absence of Infection Signs and Symptoms  Outcome: Ongoing, Progressing     Problem: Nutrition Impaired (Sepsis/Septic Shock)  Goal: Optimal Nutrition Intake  Outcome: Ongoing, Progressing     Problem: Fluid and Electrolyte Imbalance (Acute Kidney Injury/Impairment)  Goal: Fluid and Electrolyte Balance  Outcome: Ongoing, Progressing     Problem: Oral Intake Inadequate (Acute Kidney Injury/Impairment)  Goal: Optimal Nutrition Intake  Outcome: Ongoing, Progressing     Problem: Renal Function Impairment (Acute Kidney Injury/Impairment)  Goal: Effective Renal Function  Outcome: Ongoing, Progressing     Problem: Adult Inpatient Plan of Care  Goal: Plan of Care Review  Outcome: Ongoing, Progressing  Goal: Patient-Specific Goal (Individualized)  Outcome: Ongoing,  Progressing  Goal: Absence of Hospital-Acquired Illness or Injury  Outcome: Ongoing, Progressing  Goal: Optimal Comfort and Wellbeing  Outcome: Ongoing, Progressing  Goal: Readiness for Transition of Care  Outcome: Ongoing, Progressing     Problem: Diabetes Comorbidity  Goal: Blood Glucose Level Within Targeted Range  Outcome: Ongoing, Progressing     Problem: Impaired Wound Healing  Goal: Optimal Wound Healing  Outcome: Ongoing, Progressing     Problem: Fall Injury Risk  Goal: Absence of Fall and Fall-Related Injury  Outcome: Ongoing, Progressing     Problem: Skin Injury Risk Increased  Goal: Skin Health and Integrity  Outcome: Ongoing, Progressing

## 2022-03-19 NOTE — ASSESSMENT & PLAN NOTE
56 year old woman with complex medical history.  Now s/p revision RLE BKA to AKA    -Continue insulin drip and bariatric liquids.  Endocrine following.  Appreciate management.  Glucose under much better control  -Hyperkalemia resolving with insulin drip  -Continue pain management by acute pain service with perineural catheters.  Pain well controlled.  -PT/OT for dispo eval, family and patient wanting to be discharged home if possible as they have completed two stints in rehab  -daily cbc

## 2022-03-19 NOTE — PLAN OF CARE
POC reviewed with patient who verbalized understanding. VSS on RA. AAOX4. Remains free of falls and injury. Frequent weight shift encouraged with assistance provided as needed    - R AKA w/gauze and ACE wrap, CDI  - R leg DALIA drain  - R midline catheter  - ropivacaine drip in place, pain controlled with PRN medications per MAR  - Purewick in place  - mIVF  - Blood glucose being monitored AC/HS with scheduled insulin    Tolerating diet, denies nausea. Telemetry being monitored running NSR. Educated on IS use. Patient denies chest pain & SOB. No acute events. No distress noted. Bed in lowest position, call light within reach, frequent rounds made for safety.     WCTM       Problem: Infection  Goal: Absence of Infection Signs and Symptoms  Outcome: Ongoing, Progressing     Problem: Diabetes Comorbidity  Goal: Blood Glucose Level Within Targeted Range  Outcome: Ongoing, Progressing     Problem: Impaired Wound Healing  Goal: Optimal Wound Healing  Outcome: Ongoing, Progressing     Problem: Fall Injury Risk  Goal: Absence of Fall and Fall-Related Injury  Outcome: Ongoing, Progressing     Problem: Skin Injury Risk Increased  Goal: Skin Health and Integrity  Outcome: Ongoing, Progressing

## 2022-03-19 NOTE — PROGRESS NOTES
Pt H&H resulted as 6.5 and 20.3. Potassium resulted as 5.8. MD on call notified and ordered 2 units of PRBCs and a redraw of potassium to confirm hyperkalemia.    Update 2200  During nightly accucecks BS resulted as >500 on two separate finger sticks. MD on call notified.    Update 2300  Potassium resulted as 7.0 and glucose resulted as 662. MD on call notified. We prepared to shift patient as well as consulted endocrine. Endocrine decided to not shift patient as he wanted to start insulin drip. Calcium gluconate and sodium zirconium given to help lower potassium along with insulin. Issues w/ maintaining IV access overnight. Pt would benefit from a more secure IV access rather than peripheral IV.     Update 0100  Pt currently on insulin drip doing q1 hour accucecks. BS still reading >500 on accucecks. ISTAT for potassium resulted as 6.1. Will redraw labs to confirm stable H&H, potassium, and glucose. Pt resting comfortable and in no distress. All VSS on RA.

## 2022-03-19 NOTE — CARE UPDATE
Originally paged Hbg of 6.5 and  a K of 5.8. 2U of PRBC's ordered and repeat BMP obtained. Labs confirmed hyperkalemia with a K of 7 and also noted glucose 662. Calcium was ordered, she was put on tele and Endocrine was consulted who started pt on IV insulin infusion protocol. We had trouble gaining additional access to run all the appropriate medications initially and anesthesia was consulted for line placement.  During this time she was given additional insulin to aid in potassium shift. She was also given Albuterol and sodium zirconium. Anesthesia was unable to get additional access but PRBCs finished infusing and allowed for medications to be run approprietly. She is now on insulin infusion protocol and recent  istat showed K of 6.1 down from 7.0. Repeats labs to be drawn in a couple hours to assess response to blood transfusion and monitor potassium. Pt denies any complaints besides some pain at the amputation site.. She has a dressing in place from amputation that is not saturated with blood. Drain with only minimal bloody output since arriving on floor. Will follow-up am labs.     Godwin Ramires MD  General Surgery  [phone]    Updated 5:45am: Am labs K down to 5.6. Glucose down trending but still at 444. Hbg responded appropriately to blood transfusion. She continues to be on insulin drip per protocol.

## 2022-03-19 NOTE — SUBJECTIVE & OBJECTIVE
Medications:  Continuous Infusions:   sodium chloride 0.9% 100 mL/hr at 03/19/22 0921    ROPIvacaine (PF) 2 mg/ml (0.2%)       Scheduled Meds:   acetaminophen  1,000 mg Oral Q8H    allopurinoL  100 mg Oral Daily    atorvastatin  80 mg Oral QHS    carvediloL  3.125 mg Oral BID    ceFAZolin (ANCEF) IVPB  2 g Intravenous Q8H    furosemide  40 mg Oral Daily    heparin (porcine)  5,000 Units Subcutaneous Q8H    insulin aspart U-100  5 Units Subcutaneous TIDWM    insulin detemir U-100  12 Units Subcutaneous Daily    magnesium oxide  400 mg Oral BID    mirtazapine  15 mg Oral Nightly    NIFEdipine  30 mg Oral Daily    pregabalin  75 mg Oral QHS    sertraline  50 mg Oral Daily    sodium bicarbonate  650 mg Oral BID     PRN Meds:sodium chloride, amitriptyline, dextrose 10%, dextrose 10%, glucagon (human recombinant), glucose, glucose, HYDROmorphone, insulin aspart U-100, melatonin, ondansetron, oxyCODONE, sodium chloride 0.9%     Objective:     Vital Signs (Most Recent):  Temp: 98.6 °F (37 °C) (03/19/22 0706)  Pulse: 90 (03/19/22 0936)  Resp: 16 (03/19/22 0936)  BP: (!) 124/56 (03/19/22 0936)  SpO2: 98 % (03/19/22 0706)   Vital Signs (24h Range):  Temp:  [97.2 °F (36.2 °C)-98.6 °F (37 °C)] 98.6 °F (37 °C)  Pulse:  [74-97] 90  Resp:  [10-25] 16  SpO2:  [96 %-100 %] 98 %  BP: ()/(48-75) 124/56         Physical Exam  Constitutional:       General: She is not in acute distress.     Appearance: She is obese.   HENT:      Head: Normocephalic.      Mouth/Throat:      Mouth: Mucous membranes are moist.   Eyes:      Pupils: Pupils are equal, round, and reactive to light.   Cardiovascular:      Rate and Rhythm: Normal rate and regular rhythm.   Abdominal:      General: There is no distension.      Tenderness: There is no abdominal tenderness.   Musculoskeletal:      Comments: RLE amputation site with dressings CDI.  Drain sanguenous, low output.   Neurological:      Mental Status: She is alert.       Significant Labs:  BMP:    Recent Labs   Lab 03/19/22 0822   *      K 4.9  4.9      CO2 21*   BUN 58*   CREATININE 1.9*   CALCIUM 8.1*     CBC:   Recent Labs   Lab 03/19/22 0822   WBC 8.98   RBC 2.88*   HGB 8.7*   HCT 27.4*      MCV 95   MCH 30.2   MCHC 31.8*       Significant Diagnostics:  none

## 2022-03-19 NOTE — PROGRESS NOTES
[facility]  Vascular Surgery  Progress Note    Patient Name: Suyapa Connelly  MRN: [id number]  Admission Date: 3/18/2022  Primary Care Provider: Magen Christensen MD    Subjective:     Interval History: Hyperglycemia with hyperkalemia overnight.  Endocrine consulted stat and patient improved on insulin drip.  Additional transfusion overnight, Hg now stable.  Low drain output.    Post-Op Info:  Procedure(s) (LRB):  AMPUTATION, ABOVE KNEE (Right)   1 Day Post-Op       Medications:  Continuous Infusions:   sodium chloride 0.9% 100 mL/hr at 03/19/22 0921    ROPIvacaine (PF) 2 mg/ml (0.2%)       Scheduled Meds:   acetaminophen  1,000 mg Oral Q8H    allopurinoL  100 mg Oral Daily    atorvastatin  80 mg Oral QHS    carvediloL  3.125 mg Oral BID    ceFAZolin (ANCEF) IVPB  2 g Intravenous Q8H    furosemide  40 mg Oral Daily    heparin (porcine)  5,000 Units Subcutaneous Q8H    insulin aspart U-100  5 Units Subcutaneous TIDWM    insulin detemir U-100  12 Units Subcutaneous Daily    magnesium oxide  400 mg Oral BID    mirtazapine  15 mg Oral Nightly    NIFEdipine  30 mg Oral Daily    pregabalin  75 mg Oral QHS    sertraline  50 mg Oral Daily    sodium bicarbonate  650 mg Oral BID     PRN Meds:sodium chloride, amitriptyline, dextrose 10%, dextrose 10%, glucagon (human recombinant), glucose, glucose, HYDROmorphone, insulin aspart U-100, melatonin, ondansetron, oxyCODONE, sodium chloride 0.9%     Objective:     Vital Signs (Most Recent):  Temp: 98.6 °F (37 °C) (03/19/22 0706)  Pulse: 90 (03/19/22 0936)  Resp: 16 (03/19/22 0936)  BP: (!) 124/56 (03/19/22 0936)  SpO2: 98 % (03/19/22 0706)   Vital Signs (24h Range):  Temp:  [97.2 °F (36.2 °C)-98.6 °F (37 °C)] 98.6 °F (37 °C)  Pulse:  [74-97] 90  Resp:  [10-25] 16  SpO2:  [96 %-100 %] 98 %  BP: ()/(48-75) 124/56         Physical Exam  Constitutional:       General: She is not in acute distress.     Appearance: She is obese.   HENT:      Head: Normocephalic.       Mouth/Throat:      Mouth: Mucous membranes are moist.   Eyes:      Pupils: Pupils are equal, round, and reactive to light.   Cardiovascular:      Rate and Rhythm: Normal rate and regular rhythm.   Abdominal:      General: There is no distension.      Tenderness: There is no abdominal tenderness.   Musculoskeletal:      Comments: RLE amputation site with dressings CDI.  Drain sanguenous, low output.   Neurological:      Mental Status: She is alert.       Significant Labs:  BMP:   Recent Labs   Lab 03/19/22  0822   *      K 4.9  4.9      CO2 21*   BUN 58*   CREATININE 1.9*   CALCIUM 8.1*     CBC:   Recent Labs   Lab 03/19/22  0822   WBC 8.98   RBC 2.88*   HGB 8.7*   HCT 27.4*      MCV 95   MCH 30.2   MCHC 31.8*       Significant Diagnostics:  none    Assessment/Plan:     Infection of below knee amputation stump  56 year old woman with complex medical history.  Now s/p revision RLE BKA to AKA    -Continue insulin drip and bariatric liquids.  Endocrine following.  Appreciate management.  Glucose under much better control  -Hyperkalemia resolving with insulin drip  -Continue pain management by acute pain service with perineural catheters.  Pain well controlled.  -PT/OT for dispo eval, family and patient wanting to be discharged home if possible as they have completed two stints in rehab  -daily cbc        Rizwan Myles MD  Vascular Surgery  [facility]

## 2022-03-20 PROBLEM — Z97.8 USES SELF-APPLIED CONTINUOUS GLUCOSE MONITORING DEVICE: Status: ACTIVE | Noted: 2022-03-20

## 2022-03-20 LAB
ANION GAP SERPL CALC-SCNC: 7 MMOL/L (ref 8–16)
BUN SERPL-MCNC: 51 MG/DL (ref 6–20)
CALCIUM SERPL-MCNC: 8.3 MG/DL (ref 8.7–10.5)
CHLORIDE SERPL-SCNC: 109 MMOL/L (ref 95–110)
CO2 SERPL-SCNC: 23 MMOL/L (ref 23–29)
CREAT SERPL-MCNC: 2 MG/DL (ref 0.5–1.4)
EST. GFR  (AFRICAN AMERICAN): 31.7 ML/MIN/1.73 M^2
EST. GFR  (NON AFRICAN AMERICAN): 27.5 ML/MIN/1.73 M^2
GLUCOSE SERPL-MCNC: 108 MG/DL (ref 70–110)
POCT GLUCOSE: 107 MG/DL (ref 70–110)
POCT GLUCOSE: 121 MG/DL (ref 70–110)
POCT GLUCOSE: 122 MG/DL (ref 70–110)
POCT GLUCOSE: 133 MG/DL (ref 70–110)
POCT GLUCOSE: 153 MG/DL (ref 70–110)
POCT GLUCOSE: 173 MG/DL (ref 70–110)
POCT GLUCOSE: 57 MG/DL (ref 70–110)
POCT GLUCOSE: 79 MG/DL (ref 70–110)
POTASSIUM SERPL-SCNC: 4.9 MMOL/L (ref 3.5–5.1)
SODIUM SERPL-SCNC: 139 MMOL/L (ref 136–145)

## 2022-03-20 PROCEDURE — 25000003 PHARM REV CODE 250: Performed by: STUDENT IN AN ORGANIZED HEALTH CARE EDUCATION/TRAINING PROGRAM

## 2022-03-20 PROCEDURE — 99232 PR SUBSEQUENT HOSPITAL CARE,LEVL II: ICD-10-PCS | Mod: ,,, | Performed by: NURSE PRACTITIONER

## 2022-03-20 PROCEDURE — 99900035 HC TECH TIME PER 15 MIN (STAT)

## 2022-03-20 PROCEDURE — 97161 PT EVAL LOW COMPLEX 20 MIN: CPT

## 2022-03-20 PROCEDURE — 97530 THERAPEUTIC ACTIVITIES: CPT

## 2022-03-20 PROCEDURE — 25000003 PHARM REV CODE 250: Performed by: NURSE PRACTITIONER

## 2022-03-20 PROCEDURE — 80048 BASIC METABOLIC PNL TOTAL CA: CPT | Performed by: STUDENT IN AN ORGANIZED HEALTH CARE EDUCATION/TRAINING PROGRAM

## 2022-03-20 PROCEDURE — 99232 SBSQ HOSP IP/OBS MODERATE 35: CPT | Mod: ,,, | Performed by: NURSE PRACTITIONER

## 2022-03-20 PROCEDURE — 20600001 HC STEP DOWN PRIVATE ROOM

## 2022-03-20 PROCEDURE — 63600175 PHARM REV CODE 636 W HCPCS: Performed by: STUDENT IN AN ORGANIZED HEALTH CARE EDUCATION/TRAINING PROGRAM

## 2022-03-20 PROCEDURE — 97165 OT EVAL LOW COMPLEX 30 MIN: CPT

## 2022-03-20 PROCEDURE — 99231 PR SUBSEQUENT HOSPITAL CARE,LEVL I: ICD-10-PCS | Mod: ,,, | Performed by: ANESTHESIOLOGY

## 2022-03-20 PROCEDURE — 99231 SBSQ HOSP IP/OBS SF/LOW 25: CPT | Mod: ,,, | Performed by: ANESTHESIOLOGY

## 2022-03-20 PROCEDURE — 97535 SELF CARE MNGMENT TRAINING: CPT

## 2022-03-20 PROCEDURE — 94799 UNLISTED PULMONARY SVC/PX: CPT

## 2022-03-20 RX ORDER — OXYCODONE HYDROCHLORIDE 10 MG/1
10 TABLET ORAL EVERY 4 HOURS PRN
Status: DISCONTINUED | OUTPATIENT
Start: 2022-03-20 | End: 2022-03-22 | Stop reason: HOSPADM

## 2022-03-20 RX ORDER — OXYCODONE HYDROCHLORIDE 5 MG/1
5 TABLET ORAL EVERY 4 HOURS PRN
Status: DISCONTINUED | OUTPATIENT
Start: 2022-03-20 | End: 2022-03-22 | Stop reason: HOSPADM

## 2022-03-20 RX ORDER — INSULIN ASPART 100 [IU]/ML
3 INJECTION, SOLUTION INTRAVENOUS; SUBCUTANEOUS
Status: DISCONTINUED | OUTPATIENT
Start: 2022-03-20 | End: 2022-03-22 | Stop reason: HOSPADM

## 2022-03-20 RX ADMIN — SODIUM BICARBONATE 650 MG: 650 TABLET ORAL at 09:03

## 2022-03-20 RX ADMIN — FUROSEMIDE 40 MG: 40 TABLET ORAL at 10:03

## 2022-03-20 RX ADMIN — Medication 400 MG: at 10:03

## 2022-03-20 RX ADMIN — ALLOPURINOL 100 MG: 100 TABLET ORAL at 10:03

## 2022-03-20 RX ADMIN — OXYCODONE HYDROCHLORIDE 10 MG: 10 TABLET ORAL at 01:03

## 2022-03-20 RX ADMIN — INSULIN ASPART 3 UNITS: 100 INJECTION, SOLUTION INTRAVENOUS; SUBCUTANEOUS at 12:03

## 2022-03-20 RX ADMIN — ACETAMINOPHEN 1000 MG: 500 TABLET ORAL at 09:03

## 2022-03-20 RX ADMIN — CARVEDILOL 3.12 MG: 3.12 TABLET, FILM COATED ORAL at 09:03

## 2022-03-20 RX ADMIN — HEPARIN SODIUM 5000 UNITS: 5000 INJECTION INTRAVENOUS; SUBCUTANEOUS at 09:03

## 2022-03-20 RX ADMIN — HEPARIN SODIUM 5000 UNITS: 5000 INJECTION INTRAVENOUS; SUBCUTANEOUS at 06:03

## 2022-03-20 RX ADMIN — OXYCODONE HYDROCHLORIDE 10 MG: 10 TABLET ORAL at 08:03

## 2022-03-20 RX ADMIN — ACETAMINOPHEN 1000 MG: 500 TABLET ORAL at 06:03

## 2022-03-20 RX ADMIN — Medication 16 G: at 12:03

## 2022-03-20 RX ADMIN — OXYCODONE HYDROCHLORIDE 10 MG: 10 TABLET ORAL at 06:03

## 2022-03-20 RX ADMIN — CLOPIDOGREL 75 MG: 75 TABLET, FILM COATED ORAL at 10:03

## 2022-03-20 RX ADMIN — OXYCODONE HYDROCHLORIDE 10 MG: 10 TABLET ORAL at 09:03

## 2022-03-20 RX ADMIN — ATORVASTATIN CALCIUM 80 MG: 20 TABLET, FILM COATED ORAL at 09:03

## 2022-03-20 RX ADMIN — CARVEDILOL 3.12 MG: 3.12 TABLET, FILM COATED ORAL at 10:03

## 2022-03-20 RX ADMIN — AMITRIPTYLINE HYDROCHLORIDE 10 MG: 10 TABLET, FILM COATED ORAL at 09:03

## 2022-03-20 RX ADMIN — INSULIN ASPART 3 UNITS: 100 INJECTION, SOLUTION INTRAVENOUS; SUBCUTANEOUS at 05:03

## 2022-03-20 RX ADMIN — SODIUM BICARBONATE 650 MG: 650 TABLET ORAL at 10:03

## 2022-03-20 RX ADMIN — PREGABALIN 75 MG: 75 CAPSULE ORAL at 09:03

## 2022-03-20 RX ADMIN — MIRTAZAPINE 15 MG: 15 TABLET, FILM COATED ORAL at 09:03

## 2022-03-20 RX ADMIN — Medication 400 MG: at 09:03

## 2022-03-20 RX ADMIN — SERTRALINE HYDROCHLORIDE 50 MG: 50 TABLET ORAL at 10:03

## 2022-03-20 RX ADMIN — SODIUM CHLORIDE: 0.9 INJECTION, SOLUTION INTRAVENOUS at 02:03

## 2022-03-20 RX ADMIN — NIFEDIPINE 30 MG: 30 TABLET, FILM COATED, EXTENDED RELEASE ORAL at 10:03

## 2022-03-20 RX ADMIN — ROPIVACAINE HYDROCHLORIDE 200 ML: 2 INJECTION, SOLUTION EPIDURAL; INFILTRATION at 09:03

## 2022-03-20 RX ADMIN — INSULIN ASPART 5 UNITS: 100 INJECTION, SOLUTION INTRAVENOUS; SUBCUTANEOUS at 07:03

## 2022-03-20 RX ADMIN — HEPARIN SODIUM 5000 UNITS: 5000 INJECTION INTRAVENOUS; SUBCUTANEOUS at 01:03

## 2022-03-20 RX ADMIN — ACETAMINOPHEN 1000 MG: 500 TABLET ORAL at 01:03

## 2022-03-20 NOTE — ASSESSMENT & PLAN NOTE
Endocrinology consulted for BG management.   BG goal 140-180    20% reduction in most recent regimen started by Dr. Trino Baxter D/JOSE ALBERTO Levemir Flex Pen 12 units daily (Patient's BG 54 at midnight; treated for hypoglycemia and still awoke with FBG below goal)  - Novolog (aspart) insulin 3 Units SQ TIDWM and prn for BG excursions /50 SSI (30% reduction due to prandial BG below goal).   - BG checks AC/HS      ** Please notify Endocrine for any change and/or advance in diet**  ** Please call Endocrine for any BG related issues **    Discharge Planning:   TBD. Please notify endocrinology prior to discharge.

## 2022-03-20 NOTE — SUBJECTIVE & OBJECTIVE
Medications:  Continuous Infusions:   sodium chloride 0.9% 100 mL/hr at 03/19/22 1825    ROPIvacaine (PF) 2 mg/ml (0.2%)       Scheduled Meds:   acetaminophen  1,000 mg Oral Q8H    allopurinoL  100 mg Oral Daily    atorvastatin  80 mg Oral QHS    carvediloL  3.125 mg Oral BID    clopidogreL  75 mg Oral Daily    furosemide  40 mg Oral Daily    heparin (porcine)  5,000 Units Subcutaneous Q8H    insulin aspart U-100  5 Units Subcutaneous TIDWM    insulin detemir U-100  12 Units Subcutaneous Daily    magnesium oxide  400 mg Oral BID    mirtazapine  15 mg Oral Nightly    NIFEdipine  30 mg Oral Daily    pregabalin  75 mg Oral QHS    sertraline  50 mg Oral Daily    sodium bicarbonate  650 mg Oral BID     PRN Meds:sodium chloride, amitriptyline, dextrose 10%, dextrose 10%, glucagon (human recombinant), glucose, glucose, insulin aspart U-100, melatonin, ondansetron, oxyCODONE, oxyCODONE, sodium chloride 0.9%     Objective:     Vital Signs (Most Recent):  Temp: 98.2 °F (36.8 °C) (03/20/22 0752)  Pulse: 97 (03/20/22 0752)  Resp: 16 (03/20/22 0811)  BP: 138/62 (03/20/22 0752)  SpO2: 97 % (03/20/22 0752) Vital Signs (24h Range):  Temp:  [97.4 °F (36.3 °C)-98.5 °F (36.9 °C)] 98.2 °F (36.8 °C)  Pulse:  [88-97] 97  Resp:  [15-19] 16  SpO2:  [92 %-98 %] 97 %  BP: (116-138)/(55-73) 138/62     Date 03/20/22 0700 - 03/21/22 0659   Shift 1140-6016 4786-7228 2900-8921 24 Hour Total   INTAKE   Shift Total(mL/kg)       OUTPUT   Urine(mL/kg/hr) 200   200   Shift Total(mL/kg) 200(3.1)   200(3.1)   Weight (kg) 65.3 65.3 65.3 65.3       Physical Exam  Constitutional:       General: She is not in acute distress.     Appearance: She is obese.   HENT:      Head: Normocephalic.      Mouth/Throat:      Mouth: Mucous membranes are moist.   Eyes:      Pupils: Pupils are equal, round, and reactive to light.   Cardiovascular:      Rate and Rhythm: Normal rate and regular rhythm.   Abdominal:      General: There is no distension.      Tenderness:  There is no abdominal tenderness.   Musculoskeletal:      Comments: RLE amputation site with dressings CDI.  Drain sanguenous, low output.   Neurological:      Mental Status: She is alert.       Significant Labs:  CBC:   Recent Labs   Lab 03/19/22  0822   WBC 8.98   RBC 2.88*   HGB 8.7*   HCT 27.4*      MCV 95   MCH 30.2   MCHC 31.8*     CMP:   Recent Labs   Lab 03/20/22  0431      CALCIUM 8.3*      K 4.9   CO2 23      BUN 51*   CREATININE 2.0*       Significant Diagnostics:  I have reviewed all pertinent imaging results/findings within the past 24 hours.

## 2022-03-20 NOTE — ANESTHESIA POST-OP PAIN MANAGEMENT
Acute Pain Service Progress Note    Suyapa Connelly is a 56 y.o., female, [id number].    Surgery:  AMPUTATION, ABOVE KNEE (Right Leg Upper)    Post Op Day #: 2    Catheter type: perineural  femoral    Infusion type: Ropivacaine 0.2%  10 mL/hr basal w/ 5 mL/q30m DB    Problem List:    Active Hospital Problems    Diagnosis  POA    Surgical wound present [T14.8XXA]  Unknown    Infection of below knee amputation stump [T87.40]  Yes    Type 2 diabetes mellitus with hyperglycemia, with long-term current use of insulin [E11.65, Z79.4]  Not Applicable      Resolved Hospital Problems   No resolved problems to display.       Subjective:     General appearance of alert, oriented, no complaints   Pain with rest: 1    Faces   Pain with movement: 5    Faces   Side Effects    1. Pruritis No    2. Nausea No    3. Motor Blockade No, 0=Ability to raise lower extremities off bed    4. Sedation No, 1=awake and alert    Objective:   Catheter site clean, dry, intact      Vitals   Vitals:    03/20/22 0610   BP:    Pulse:    Resp: 17   Temp:         Labs    Admission on 03/18/2022   Component Date Value Ref Range Status    Group & Rh 03/18/2022 O POS   Final    Indirect Octaviano 03/18/2022 NEG   Final    WBC 03/18/2022 8.03  3.90 - 12.70 K/uL Final    RBC 03/18/2022 2.91 (A) 4.00 - 5.40 M/uL Final    Hemoglobin 03/18/2022 8.7 (A) 12.0 - 16.0 g/dL Final    Hematocrit 03/18/2022 28.2 (A) 37.0 - 48.5 % Final    MCV 03/18/2022 97  82 - 98 fL Final    MCH 03/18/2022 29.9  27.0 - 31.0 pg Final    MCHC 03/18/2022 30.9 (A) 32.0 - 36.0 g/dL Final    RDW 03/18/2022 16.5 (A) 11.5 - 14.5 % Final    Platelets 03/18/2022 445  150 - 450 K/uL Final    MPV 03/18/2022 10.6  9.2 - 12.9 fL Final    Immature Granulocytes 03/18/2022 0.2  0.0 - 0.5 % Final    Gran # (ANC) 03/18/2022 4.8  1.8 - 7.7 K/uL Final    Immature Grans (Abs) 03/18/2022 0.02  0.00 - 0.04 K/uL Final    Lymph # 03/18/2022 1.9  1.0 - 4.8 K/uL Final    Mono # 03/18/2022 0.8   0.3 - 1.0 K/uL Final    Eos # 03/18/2022 0.5  0.0 - 0.5 K/uL Final    Baso # 03/18/2022 0.05  0.00 - 0.20 K/uL Final    nRBC 03/18/2022 0  0 /100 WBC Final    Gran % 03/18/2022 59.7  38.0 - 73.0 % Final    Lymph % 03/18/2022 23.9  18.0 - 48.0 % Final    Mono % 03/18/2022 10.0  4.0 - 15.0 % Final    Eosinophil % 03/18/2022 5.6  0.0 - 8.0 % Final    Basophil % 03/18/2022 0.6  0.0 - 1.9 % Final    Differential Method 03/18/2022 Automated   Final    POCT Glucose 03/18/2022 181 (A) 70 - 110 mg/dL Final    UNIT NUMBER 03/18/2022 [id number]   Final    Product Code 03/18/2022 [id number]   Final    DISPENSE STATUS 03/18/2022 TRANSFUSED   Final    CODING SYSTEM 03/18/2022 [id number]   Final    Unit Blood Type Code 03/18/2022 5100   Final    Unit Blood Type 03/18/2022 O POS   Final    Unit Expiration 03/18/2022 [id number]   Final    Sodium 03/18/2022 136  136 - 145 mmol/L Final    Potassium 03/18/2022 5.8 (A) 3.5 - 5.1 mmol/L Final    Chloride 03/18/2022 107  95 - 110 mmol/L Final    CO2 03/18/2022 21 (A) 23 - 29 mmol/L Final    Glucose 03/18/2022 245 (A) 70 - 110 mg/dL Final    BUN 03/18/2022 60 (A) 6 - 20 mg/dL Final    Creatinine 03/18/2022 2.3 (A) 0.5 - 1.4 mg/dL Final    Calcium 03/18/2022 8.6 (A) 8.7 - 10.5 mg/dL Final    Anion Gap 03/18/2022 8  8 - 16 mmol/L Final    eGFR if  03/18/2022 26.8 (A) >60 mL/min/1.73 m^2 Final    eGFR if non  03/18/2022 23.2 (A) >60 mL/min/1.73 m^2 Final    WBC 03/18/2022 12.67  3.90 - 12.70 K/uL Final    RBC 03/18/2022 2.09 (A) 4.00 - 5.40 M/uL Final    Hemoglobin 03/18/2022 6.5 (A) 12.0 - 16.0 g/dL Final    Hematocrit 03/18/2022 20.3 (A) 37.0 - 48.5 % Final    MCV 03/18/2022 97  82 - 98 fL Final    MCH 03/18/2022 31.1 (A) 27.0 - 31.0 pg Final    MCHC 03/18/2022 32.0  32.0 - 36.0 g/dL Final    RDW 03/18/2022 15.7 (A) 11.5 - 14.5 % Final    Platelets 03/18/2022 383  150 - 450 K/uL Final    MPV 03/18/2022 11.0  9.2 - 12.9 fL  Final    Hemoglobin A1C 03/18/2022 7.0 (A) 4.0 - 5.6 % Final    Estimated Avg Glucose 03/18/2022 154 (A) 68 - 131 mg/dL Final    UNIT NUMBER 03/18/2022 [id number]   Final    Product Code 03/18/2022 [id number]   Final    DISPENSE STATUS 03/18/2022 TRANSFUSED   Final    CODING SYSTEM 03/18/2022 [id number]   Final    Unit Blood Type Code 03/18/2022 5100   Final    Unit Blood Type 03/18/2022 O POS   Final    Unit Expiration 03/18/2022 [id number]   Final    UNIT NUMBER 03/18/2022 [id number]   Final    Product Code 03/18/2022 [id number]   Final    DISPENSE STATUS 03/18/2022 TRANSFUSED   Final    CODING SYSTEM 03/18/2022 [id number]   Final    Unit Blood Type Code 03/18/2022 5100   Final    Unit Blood Type 03/18/2022 O POS   Final    Unit Expiration 03/18/2022 [id number]   Final    Sodium 03/18/2022 130 (A) 136 - 145 mmol/L Final    Potassium 03/18/2022 7.0 (A) 3.5 - 5.1 mmol/L Final    Chloride 03/18/2022 102  95 - 110 mmol/L Final    CO2 03/18/2022 19 (A) 23 - 29 mmol/L Final    Glucose 03/18/2022 662 (A) 70 - 110 mg/dL Final    BUN 03/18/2022 55 (A) 6 - 20 mg/dL Final    Creatinine 03/18/2022 2.3 (A) 0.5 - 1.4 mg/dL Final    Calcium 03/18/2022 8.0 (A) 8.7 - 10.5 mg/dL Final    Anion Gap 03/18/2022 9  8 - 16 mmol/L Final    eGFR if  03/18/2022 26.8 (A) >60 mL/min/1.73 m^2 Final    eGFR if non  03/18/2022 23.2 (A) >60 mL/min/1.73 m^2 Final    WBC 03/19/2022 8.98  3.90 - 12.70 K/uL Final    RBC 03/19/2022 2.88 (A) 4.00 - 5.40 M/uL Final    Hemoglobin 03/19/2022 8.7 (A) 12.0 - 16.0 g/dL Final    Hematocrit 03/19/2022 27.4 (A) 37.0 - 48.5 % Final    MCV 03/19/2022 95  82 - 98 fL Final    MCH 03/19/2022 30.2  27.0 - 31.0 pg Final    MCHC 03/19/2022 31.8 (A) 32.0 - 36.0 g/dL Final    RDW 03/19/2022 15.0 (A) 11.5 - 14.5 % Final    Platelets 03/19/2022 263  150 - 450 K/uL Final    MPV 03/19/2022 10.8  9.2 - 12.9 fL Final    Immature Granulocytes 03/19/2022 0.3   0.0 - 0.5 % Final    Gran # (ANC) 03/19/2022 6.4  1.8 - 7.7 K/uL Final    Immature Grans (Abs) 03/19/2022 0.03  0.00 - 0.04 K/uL Final    Lymph # 03/19/2022 1.4  1.0 - 4.8 K/uL Final    Mono # 03/19/2022 1.1 (A) 0.3 - 1.0 K/uL Final    Eos # 03/19/2022 0.0  0.0 - 0.5 K/uL Final    Baso # 03/19/2022 0.04  0.00 - 0.20 K/uL Final    nRBC 03/19/2022 0  0 /100 WBC Final    Gran % 03/19/2022 71.1  38.0 - 73.0 % Final    Lymph % 03/19/2022 15.7 (A) 18.0 - 48.0 % Final    Mono % 03/19/2022 12.1  4.0 - 15.0 % Final    Eosinophil % 03/19/2022 0.4  0.0 - 8.0 % Final    Basophil % 03/19/2022 0.4  0.0 - 1.9 % Final    Differential Method 03/19/2022 Automated   Final    POCT Glucose 03/18/2022 >500 (A) 70 - 110 mg/dL Final    POCT Glucose 03/18/2022 >500 (A) 70 - 110 mg/dL Final    Potassium 03/19/2022 4.9  3.5 - 5.1 mmol/L Final    Sodium 03/19/2022 136  136 - 145 mmol/L Final    Potassium 03/19/2022 4.9  3.5 - 5.1 mmol/L Final    Chloride 03/19/2022 106  95 - 110 mmol/L Final    CO2 03/19/2022 21 (A) 23 - 29 mmol/L Final    Glucose 03/19/2022 133 (A) 70 - 110 mg/dL Final    BUN 03/19/2022 58 (A) 6 - 20 mg/dL Final    Creatinine 03/19/2022 1.9 (A) 0.5 - 1.4 mg/dL Final    Calcium 03/19/2022 8.1 (A) 8.7 - 10.5 mg/dL Final    Anion Gap 03/19/2022 9  8 - 16 mmol/L Final    eGFR if  03/19/2022 33.7 (A) >60 mL/min/1.73 m^2 Final    eGFR if non African American 03/19/2022 29.3 (A) >60 mL/min/1.73 m^2 Final    POCT Glucose 03/18/2022 >500 (A) 70 - 110 mg/dL Final    POCT Glucose 03/19/2022 >500 (A) 70 - 110 mg/dL Final    Sodium 03/19/2022 130 (A) 136 - 145 mmol/L Final    Potassium 03/19/2022 5.6 (A) 3.5 - 5.1 mmol/L Final    Chloride 03/19/2022 100  95 - 110 mmol/L Final    CO2 03/19/2022 20 (A) 23 - 29 mmol/L Final    Glucose 03/19/2022 444 (A) 70 - 110 mg/dL Final    BUN 03/19/2022 58 (A) 6 - 20 mg/dL Final    Creatinine 03/19/2022 2.4 (A) 0.5 - 1.4 mg/dL Final    Calcium  03/19/2022 8.0 (A) 8.7 - 10.5 mg/dL Final    Anion Gap 03/19/2022 10  8 - 16 mmol/L Final    eGFR if  03/19/2022 25.4 (A) >60 mL/min/1.73 m^2 Final    eGFR if non African American 03/19/2022 22.1 (A) >60 mL/min/1.73 m^2 Final    WBC 03/19/2022 9.03  3.90 - 12.70 K/uL Final    RBC 03/19/2022 2.99 (A) 4.00 - 5.40 M/uL Final    Hemoglobin 03/19/2022 9.1 (A) 12.0 - 16.0 g/dL Final    Hematocrit 03/19/2022 28.3 (A) 37.0 - 48.5 % Final    MCV 03/19/2022 95  82 - 98 fL Final    MCH 03/19/2022 30.4  27.0 - 31.0 pg Final    MCHC 03/19/2022 32.2  32.0 - 36.0 g/dL Final    RDW 03/19/2022 15.1 (A) 11.5 - 14.5 % Final    Platelets 03/19/2022 293  150 - 450 K/uL Final    MPV 03/19/2022 10.8  9.2 - 12.9 fL Final    POCT Glucose 03/19/2022 >500 (A) 70 - 110 mg/dL Final    POC Potassium 03/19/2022 6.1 (A) 3.5 - 5.1 mmol/L Final    Sample 03/19/2022 VENOUS   Final    Site 03/19/2022 Other   Final    Allens Test 03/19/2022 N/A   Final    POCT Glucose 03/19/2022 >500 (A) 70 - 110 mg/dL Final    POCT Glucose 03/19/2022 462 (A) 70 - 110 mg/dL Final    POCT Glucose 03/18/2022 223 (A) 70 - 110 mg/dL Final    POCT Glucose 03/19/2022 372 (A) 70 - 110 mg/dL Final    POCT Glucose 03/19/2022 313 (A) 70 - 110 mg/dL Final    POCT Glucose 03/19/2022 257 (A) 70 - 110 mg/dL Final    POCT Glucose 03/19/2022 167 (A) 70 - 110 mg/dL Final    POCT Glucose 03/19/2022 97  70 - 110 mg/dL Final    POCT Glucose 03/19/2022 90  70 - 110 mg/dL Final    Potassium 03/19/2022 4.6  3.5 - 5.1 mmol/L Final    Sodium 03/19/2022 133 (A) 136 - 145 mmol/L Final    Potassium 03/19/2022 4.6  3.5 - 5.1 mmol/L Final    Chloride 03/19/2022 103  95 - 110 mmol/L Final    CO2 03/19/2022 23  23 - 29 mmol/L Final    Glucose 03/19/2022 100  70 - 110 mg/dL Final    BUN 03/19/2022 53 (A) 6 - 20 mg/dL Final    Creatinine 03/19/2022 1.9 (A) 0.5 - 1.4 mg/dL Final    Calcium 03/19/2022 8.4 (A) 8.7 - 10.5 mg/dL Final    Anion Gap  03/19/2022 7 (A) 8 - 16 mmol/L Final    eGFR if  03/19/2022 33.7 (A) >60 mL/min/1.73 m^2 Final    eGFR if non African American 03/19/2022 29.3 (A) >60 mL/min/1.73 m^2 Final    POCT Glucose 03/19/2022 113 (A) 70 - 110 mg/dL Final    POCT Glucose 03/19/2022 95  70 - 110 mg/dL Final    POCT Glucose 03/19/2022 184 (A) 70 - 110 mg/dL Final    POCT Glucose 03/20/2022 57 (A) 70 - 110 mg/dL Final    POCT Glucose 03/20/2022 79  70 - 110 mg/dL Final    Sodium 03/20/2022 139  136 - 145 mmol/L Final    Potassium 03/20/2022 4.9  3.5 - 5.1 mmol/L Final    Chloride 03/20/2022 109  95 - 110 mmol/L Final    CO2 03/20/2022 23  23 - 29 mmol/L Final    Glucose 03/20/2022 108  70 - 110 mg/dL Final    BUN 03/20/2022 51 (A) 6 - 20 mg/dL Final    Creatinine 03/20/2022 2.0 (A) 0.5 - 1.4 mg/dL Final    Calcium 03/20/2022 8.3 (A) 8.7 - 10.5 mg/dL Final    Anion Gap 03/20/2022 7 (A) 8 - 16 mmol/L Final    eGFR if  03/20/2022 31.7 (A) >60 mL/min/1.73 m^2 Final    eGFR if non African American 03/20/2022 27.5 (A) >60 mL/min/1.73 m^2 Final    POCT Glucose 03/20/2022 121 (A) 70 - 110 mg/dL Final        Meds   Current Facility-Administered Medications   Medication Dose Route Frequency Provider Last Rate Last Admin    0.9%  NaCl infusion (for blood administration)   Intravenous Q24H PRN Godwin Ramires MD        0.9%  NaCl infusion   Intravenous Continuous Rizwan Myles  mL/hr at 03/19/22 1825 Rate Verify at 03/19/22 1825    acetaminophen tablet 1,000 mg  1,000 mg Oral Q8H Rizwan Myles MD   1,000 mg at 03/20/22 0610    allopurinoL tablet 100 mg  100 mg Oral Daily Rizwan Myles MD   100 mg at 03/19/22 0940    amitriptyline tablet 10 mg  10 mg Oral Nightly PRN David Kelley MD   10 mg at 03/19/22 2107    atorvastatin tablet 80 mg  80 mg Oral QHS Rizwan Myles MD   80 mg at 03/19/22 2106    carvediloL tablet 3.125 mg  3.125 mg Oral BID Rizwan Myles MD   3.125 mg at  03/19/22 2106    clopidogreL tablet 75 mg  75 mg Oral Daily Genny Aldana MD   75 mg at 03/19/22 1050    dextrose 10% bolus 125 mL  12.5 g Intravenous PRN DAYNE Florez II, MD        dextrose 10% bolus 250 mL  25 g Intravenous PRN DAYNE Florez II, MD        furosemide tablet 40 mg  40 mg Oral Daily Rizwan Myles MD   40 mg at 03/19/22 0942    glucagon (human recombinant) injection 1 mg  1 mg Intramuscular PRN Jacinto Brownlee DNP, FNP        glucose chewable tablet 16 g  16 g Oral PRN Jacinto Brownlee DNP, FNP   16 g at 03/20/22 0039    glucose chewable tablet 24 g  24 g Oral PRN Jacinto Brownlee DNP, FNP        heparin (porcine) injection 5,000 Units  5,000 Units Subcutaneous Q8H Rizwan Myles MD   5,000 Units at 03/20/22 0610    insulin aspart U-100 pen 0-5 Units  0-5 Units Subcutaneous QID (AC + HS) PRN Jacinto Brownlee DNP, FNP        insulin aspart U-100 pen 5 Units  5 Units Subcutaneous TIDWM Jacinto Brownlee DNP, FNP   5 Units at 03/19/22 1600    insulin detemir U-100 pen 12 Units  12 Units Subcutaneous Daily Jacinto Brownlee DNP, FNP   12 Units at 03/19/22 0943    magnesium oxide tablet 400 mg  400 mg Oral BID Rizwan Myles MD   400 mg at 03/19/22 2106    melatonin tablet 6 mg  6 mg Oral Nightly PRN Rizwan Myles MD   6 mg at 03/18/22 2152    mirtazapine tablet 15 mg  15 mg Oral Nightly Rizwan Myles MD   15 mg at 03/19/22 2106    NIFEdipine 24 hr tablet 30 mg  30 mg Oral Daily Rizwan Myles MD   30 mg at 03/19/22 0942    ondansetron injection 4 mg  4 mg Intravenous Q12H PRN Rizwan Myles MD        oxyCODONE immediate release tablet 5 mg  5 mg Oral Q4H PRN David Kelley MD        oxyCODONE immediate release tablet Tab 10 mg  10 mg Oral Q4H PRN David Kelley MD        pregabalin capsule 75 mg  75 mg Oral QHS David Kelley MD   75 mg at 03/19/22 2106    ROPIvacaine (PF) 2 mg/ml (0.2%) solution   Perineural Continuous Sofia C  MD Jaylon   200 mL at 03/18/22 1806    sertraline tablet 50 mg  50 mg Oral Daily Rizwan Myles MD   50 mg at 03/19/22 0943    sodium bicarbonate tablet 650 mg  650 mg Oral BID Rizwan Myles MD   650 mg at 03/19/22 2106    sodium chloride 0.9% flush 3 mL  3 mL Intravenous PRN Rizwan Myles MD           Assessment:     Pain control adequate    Plan:   - Continue femoral PNC at current rate   - Continue scheduled Tylenol, Lyrica   - Continue PRN oxycodone 5 and 10mg, d/c PRN dilaudid   - Continue PRN elavil 10mg nightly    David Kelley MD PGY-4  Anesthesiology  [facility], [facility]

## 2022-03-20 NOTE — OP NOTE
[facility]  Surgery Department  Operative Note    SUMMARY     Surgery Date: 3/18/2022      Surgeon(s) and Role:     * DAYNE Florez II, MD - Primary     * Hazel Stone MD - Resident - Assisting     * Rizwan Myles MD - Fellow           Pre-op Diagnosis:  S/P BKA (below knee amputation) unilateral, right [Z89.511]     Post-op Diagnosis:  Post-Op Diagnosis Codes:     * S/P BKA (below knee amputation) unilateral, right [Z89.511]     Procedure(s) (LRB):  AMPUTATION, ABOVE KNEE (Right)     Anesthesia: Choice     Operative Findings: Very oozy and edematous tissue.  15 drain left.       Estimated Blood Loss: 800 mL    Description of Technical Procedures:   After informed consent was obtained the patient was brought to the operating room and laid supine on the operating table.  Preoperative regional block was performed.  LMA was inserted and general anesthesia was induced.  RLE was prepped with betadine prep and a stockinet was placed over her BKA stump with breakdown.  Draped in standard fashion.  Timeout was performed.    [place] incision was planned using half the circumference of the thigh as a guide.  Skin was divided sharply with a 10 blade and soft tissue and fascia divided with electrocautery.  Of note there was a good amount of ooze due to her plavix, as well as overally fatty replacement of muscle tissue and edema from malnutrition and disuse.  There were several geniculate collaterals that were ligated during muscle division.  Femur was cleared anteriorly.  Popliteal artery and adjacent veins were sharply cleared, clamped, and divided.  These were double suture ligated proximally. There was an occluded arterial stent that was divided.  Femur was circumferentially cleared and soft tissue was peeled back beyond incision apex with a periosteal elevator.  Femur was then divided and rasped with a power saw.   Remainder of the leg was amputated with an amputation knife.  Several collateral vessels were  identified  And clamped quickly.  These were individually tied.  Remainder of posterior flap was rendered hemostatic with suture ligature and electrocautery.  Flaps were irrigated with 1L saline.  Sciatic nerve identified, cleared, put on tension, and high ligated with silk. A 15 fluted drain was inserted thru the skin and tied in place.  Fascial edges were then reapproximated witn 2-0 vicryl suture so that a finger could not be passed into the leg.  Skin was reapproximated with loose 3-0 nylon vertical mattress retention sutures.  Skin was closed with staples.  Antibiotic ointment and Vaseline gauze applied.  Sterile compressive dressing also applied.  The patient tolerated the procedure well.  1U PRBC transfused during case for blood loss with preoperative anemia.  Taken to PACU in good condition     Implants: * No implants in log *    Specimens:   Specimen (24h ago, onward)            None                  Condition: Good    Disposition: PACU - hemodynamically stable.

## 2022-03-20 NOTE — SUBJECTIVE & OBJECTIVE
"Interval HPI:   Overnight events: Hypoglycemic overnight. Patient on the [facility] in room 1023/1023 A. Blood glucose stable. BG at, above, and below goal on current insulin regimen (SSI, prandial, and basal insulin ). Steroid use- None. 2 Days Post-Op  Renal function- Abnormal - Creatinine 2.0   Vasopressors-  None       Diet diabetic [facility]; 2000 Calorie; Isolation Tray - Regular [place]     Eatin%  Nausea: No  Hypoglycemia and intervention: Yes; BG 54; responded to hypoglycemia tx. Insulin adjustments made this AM.   Fever: No  TPN and/or TF: No    BP (!) 134/58 (BP Location: Left arm, Patient Position: Lying)   Pulse 97   Temp 98.2 °F (36.8 °C) (Oral)   Resp 16   Ht 5' 5" (1.651 m)   Wt 65.3 kg (144 lb)   LMP 2015 (Exact Date)   SpO2 97%   Breastfeeding No   BMI 23.96 kg/m²     Labs Reviewed and Include    Recent Labs   Lab 22  0431      CALCIUM 8.3*      K 4.9   CO2 23      BUN 51*   CREATININE 2.0*     Lab Results   Component Value Date    WBC 8.98 2022    HGB 8.7 (L) 2022    HCT 27.4 (L) 2022    MCV 95 2022     2022     No results for input(s): TSH, FREET4 in the last 168 hours.  Lab Results   Component Value Date    HGBA1C 7.0 (H) 2022       Nutritional status:   Body mass index is 23.96 kg/m².  Lab Results   Component Value Date    ALBUMIN 2.2 (L) 2022    ALBUMIN 2.2 (L) 03/15/2022    ALBUMIN 2.0 (L) 2022     Lab Results   Component Value Date    PREALBUMIN 10 (L) 2022    PREALBUMIN 8 (L) 2021    PREALBUMIN 6 (L) 2021       Estimated Creatinine Clearance: 28.6 mL/min (A) (based on SCr of 2 mg/dL (H)).    Accu-Checks  Recent Labs     22  0756 22  0907 22  0927 22  1126 22  1534 22  2104 22  0034 22  0056 22  0207 22  0742   POCTGLUCOSE 167* 97 90 113* 95 184* 57* 79 121* 107       Current Medications and/or Treatments Impacting " Glycemic Control  Immunotherapy:    Immunosuppressants       None          Steroids:   Hormones (From admission, onward)                Start     Stop Route Frequency Ordered    03/18/22 1741  melatonin tablet 6 mg         -- Oral Nightly PRN 03/18/22 1647          Pressors:    Autonomic Drugs (From admission, onward)                None          Hyperglycemia/Diabetes Medications:   Antihyperglycemics (From admission, onward)                Start     Stop Route Frequency Ordered    03/20/22 1130  insulin aspart U-100 pen 3 Units         -- SubQ 3 times daily with meals 03/20/22 0939    03/19/22 1018  insulin aspart U-100 pen 0-5 Units         -- SubQ Before meals & nightly PRN 03/19/22 0918

## 2022-03-20 NOTE — PROGRESS NOTES
[facility]  Vascular Surgery  Progress Note    Patient Name: Suyapa Connelly  MRN: [id number]  Admission Date: 3/18/2022  Primary Care Provider: Magen Christensen MD    Subjective:     Interval History: No acute events. Pain controlled. Diet advanced following better control of blood sugars. Tolerating well without issues. Minimal output from drain since starting Plavix.     Post-Op Info:  Procedure(s) (LRB):  AMPUTATION, ABOVE KNEE (Right)   2 Days Post-Op       Medications:  Continuous Infusions:   sodium chloride 0.9% 100 mL/hr at 03/19/22 1825    ROPIvacaine (PF) 2 mg/ml (0.2%)       Scheduled Meds:   acetaminophen  1,000 mg Oral Q8H    allopurinoL  100 mg Oral Daily    atorvastatin  80 mg Oral QHS    carvediloL  3.125 mg Oral BID    clopidogreL  75 mg Oral Daily    furosemide  40 mg Oral Daily    heparin (porcine)  5,000 Units Subcutaneous Q8H    insulin aspart U-100  5 Units Subcutaneous TIDWM    insulin detemir U-100  12 Units Subcutaneous Daily    magnesium oxide  400 mg Oral BID    mirtazapine  15 mg Oral Nightly    NIFEdipine  30 mg Oral Daily    pregabalin  75 mg Oral QHS    sertraline  50 mg Oral Daily    sodium bicarbonate  650 mg Oral BID     PRN Meds:sodium chloride, amitriptyline, dextrose 10%, dextrose 10%, glucagon (human recombinant), glucose, glucose, insulin aspart U-100, melatonin, ondansetron, oxyCODONE, oxyCODONE, sodium chloride 0.9%     Objective:     Vital Signs (Most Recent):  Temp: 98.2 °F (36.8 °C) (03/20/22 0752)  Pulse: 97 (03/20/22 0752)  Resp: 16 (03/20/22 0811)  BP: 138/62 (03/20/22 0752)  SpO2: 97 % (03/20/22 0752) Vital Signs (24h Range):  Temp:  [97.4 °F (36.3 °C)-98.5 °F (36.9 °C)] 98.2 °F (36.8 °C)  Pulse:  [88-97] 97  Resp:  [15-19] 16  SpO2:  [92 %-98 %] 97 %  BP: (116-138)/(55-73) 138/62     Date 03/20/22 0700 - 03/21/22 0659   Shift 7629-2055 7829-5490 8840-5164 24 Hour Total   INTAKE   Shift Total(mL/kg)       OUTPUT   Urine(mL/kg/hr) 200   200   Shift  Total(mL/kg) 200(3.1)   200(3.1)   Weight (kg) 65.3 65.3 65.3 65.3       Physical Exam  Constitutional:       General: She is not in acute distress.     Appearance: She is obese.   HENT:      Head: Normocephalic.      Mouth/Throat:      Mouth: Mucous membranes are moist.   Eyes:      Pupils: Pupils are equal, round, and reactive to light.   Cardiovascular:      Rate and Rhythm: Normal rate and regular rhythm.   Abdominal:      General: There is no distension.      Tenderness: There is no abdominal tenderness.   Musculoskeletal:      Comments: RLE amputation site with dressings CDI.  Drain sanguenous, low output.   Neurological:      Mental Status: She is alert.       Significant Labs:  CBC:   Recent Labs   Lab 03/19/22  0822   WBC 8.98   RBC 2.88*   HGB 8.7*   HCT 27.4*      MCV 95   MCH 30.2   MCHC 31.8*     CMP:   Recent Labs   Lab 03/20/22  0431      CALCIUM 8.3*      K 4.9   CO2 23      BUN 51*   CREATININE 2.0*       Significant Diagnostics:  I have reviewed all pertinent imaging results/findings within the past 24 hours.    Assessment/Plan:     Infection of below knee amputation stump  56 year old woman with complex medical history.  Now s/p revision RLE BKA to AKA    - Diabetic diet, Boost Glucose Control added   - Dressing and drain removed today, healing well. Re-wrapped with kerlix and ace wrap.  -Endocrine following for insulin management.  Appreciate management.  Glucose under much better control  -Hyperkalemia resolved  -Continue pain management by acute pain service with perineural catheters.  Pain well controlled.  -Home meds reordered, still holding Xarelto  -PT/OT for dispo eval, family and patient wanting to be discharged home if possible as they have completed two stints in rehab            Genny Aldana MD  Vascular Surgery  [facility]

## 2022-03-20 NOTE — PLAN OF CARE
POC reviewed with patient who verbalized understanding. VSS on RA. AAOX4. Remains free of falls and injury. Frequent weight shift encouraged with assistance provided as needed. Pt up to side of bed with PT/OT     - R AKA w/gauze and ACE wrap, CDI  - R leg DALIA drain removed by MD bedside  - R midline catheter  - ropivacaine drip in place, pain moderately controlled with PRN medications per MAR  - Purewick in place  - mIVF  - Blood glucose being monitored AC/HS with scheduled insulin     Tolerating diet, denies nausea. Telemetry being monitored running NSR. Educated on IS use. Patient denies chest pain & SOB. No acute events. No distress noted. Bed in lowest position, call light within reach, frequent rounds made for safety.      WC         Problem: Infection  Goal: Absence of Infection Signs and Symptoms  Outcome: Ongoing, Progressing     Problem: Diabetes Comorbidity  Goal: Blood Glucose Level Within Targeted Range  Outcome: Ongoing, Progressing     Problem: Impaired Wound Healing  Goal: Optimal Wound Healing  Outcome: Ongoing, Progressing     Problem: Fall Injury Risk  Goal: Absence of Fall and Fall-Related Injury  Outcome: Ongoing, Progressing     Problem: Skin Injury Risk Increased  Goal: Skin Health and Integrity  Outcome: Ongoing, Progressing

## 2022-03-20 NOTE — PLAN OF CARE
Problem: Physical Therapy Goal  Goal: Physical Therapy Goal  Description: Goals to be met by: 3/30/22    Patient will increase functional independence with mobility by performin. Bed to chair transfer with Stand-by Assistance using Slideboard if needed.     Outcome: Ongoing, Progressing   Evaluation completed and goals appropriate. 3/20/2022

## 2022-03-20 NOTE — PROGRESS NOTES
Patient complaining of 9/10 pain. Received oxy at 0610 and not due again until 1010. Patient pushing button for ropivacaine bolus with some relief to 8/10 pain.   Anesthesia on-call notified. Dr. Kelley ordered 1 x early administration of oxy 10. .   WCTM

## 2022-03-20 NOTE — PROGRESS NOTES
[facility]  Endocrinology  Progress Note    Admit Date: 3/18/2022     Reason for Consult: Management of T2DM, Hyperglycemia      Surgical Procedure and Date: AMPUTATION, ABOVE KNEE (Right)     Diabetes diagnosis year: 2006     Home Diabetes Medications:   Lantus 18 units nightly   Novolog 6 units before meals - (give this 10-15 minutes before you start eating)  Sliding Scale (second box on the log)  If your blood sugar is:  150-199             give extra 1 units of insulin to yourself.  200-249                             2 units  250-299                              3 units  300-349                             4 units  350-399                             5 units  >400                                  6 units        How often checking glucose at home? 1-3 x day   BG readings on regimen: 100's  Hypoglycemia on the regimen?  No  Missed doses on regimen?  No     Diabetes Complications include:     Hyperglycemia, Foot ulcer   and Other skin ulcer     Complicating diabetes co morbidities:   CKD, PVD, Osteomyelitis, HTN, HLD    Hemoglobin A1C   Date Value Ref Range Status   03/18/2022 7.0 (H) 4.0 - 5.6 % Final     Comment:     ADA Screening Guidelines:  5.7-6.4%  Consistent with prediabetes  >or=6.5%  Consistent with diabetes    High levels of fetal hemoglobin interfere with the HbA1C  assay. Heterozygous hemoglobin variants (HbS, HgC, etc)do  not significantly interfere with this assay.   However, presence of multiple variants may affect accuracy.     12/03/2021 9.6 (H) 4.0 - 5.6 % Final     Comment:     ADA Screening Guidelines:  5.7-6.4%  Consistent with prediabetes  >or=6.5%  Consistent with diabetes    High levels of fetal hemoglobin interfere with the HbA1C  assay. Heterozygous hemoglobin variants (HbS, HgC, etc)do  not significantly interfere with this assay.   However, presence of multiple variants may affect accuracy.     06/18/2021 5.9 (H) 4.0 - 5.6 % Final     Comment:     ADA Screening  "Guidelines:  5.7-6.4%  Consistent with prediabetes  >or=6.5%  Consistent with diabetes    High levels of fetal hemoglobin interfere with the HbA1C  assay. Heterozygous hemoglobin variants (HbS, HgC, etc)do  not significantly interfere with this assay.   However, presence of multiple variants may affect accuracy.             HPI: 57 yo F with multiple medical co-morbidities and an extensive past surgical history most significant for prior L AKA and recent R BKA with subsequent revision for poor healing (- 2022) both secondary to PAD. Patient is currently admitted to the hospital medicine service with osteomyelitis of the R BKA stump with break down of the wound/poor healing. ID is following for antibiotic recommendations. Vascular Surgery was consulted for evaluation of the extremity for possible revision to AKA given the ongoing infection and poor healing. Patient would like to have procedure with our vascular team rather than following up in [place] with her previous surgeon.  Endocrine consulted for hyperglycemia and type 2 diabetes.       Interval HPI:   Overnight events: Hypoglycemic overnight. Patient on the [facility] in room 1023/1023 A. Blood glucose stable. BG at, above, and below goal on current insulin regimen (SSI, prandial, and basal insulin ). Steroid use- None. 2 Days Post-Op  Renal function- Abnormal - Creatinine 2.0   Vasopressors-  None       Diet diabetic [facility]; 2000 Calorie; Isolation Tray - Regular [place]     Eatin%  Nausea: No  Hypoglycemia and intervention: Yes; BG 54; responded to hypoglycemia tx. Insulin adjustments made this AM.   Fever: No  TPN and/or TF: No    BP (!) 134/58 (BP Location: Left arm, Patient Position: Lying)   Pulse 97   Temp 98.2 °F (36.8 °C) (Oral)   Resp 16   Ht 5' 5" (1.651 m)   Wt 65.3 kg (144 lb)   LMP 2015 (Exact Date)   SpO2 97%   Breastfeeding No   BMI 23.96 kg/m²     Labs Reviewed and Include    Recent Labs   Lab 22  0431 "      CALCIUM 8.3*      K 4.9   CO2 23      BUN 51*   CREATININE 2.0*     Lab Results   Component Value Date    WBC 8.98 03/19/2022    HGB 8.7 (L) 03/19/2022    HCT 27.4 (L) 03/19/2022    MCV 95 03/19/2022     03/19/2022     No results for input(s): TSH, FREET4 in the last 168 hours.  Lab Results   Component Value Date    HGBA1C 7.0 (H) 03/18/2022       Nutritional status:   Body mass index is 23.96 kg/m².  Lab Results   Component Value Date    ALBUMIN 2.2 (L) 03/16/2022    ALBUMIN 2.2 (L) 03/15/2022    ALBUMIN 2.0 (L) 03/14/2022     Lab Results   Component Value Date    PREALBUMIN 10 (L) 02/03/2022    PREALBUMIN 8 (L) 06/07/2021    PREALBUMIN 6 (L) 05/31/2021       Estimated Creatinine Clearance: 28.6 mL/min (A) (based on SCr of 2 mg/dL (H)).    Accu-Checks  Recent Labs     03/19/22  0756 03/19/22  0907 03/19/22  0927 03/19/22  1126 03/19/22  1534 03/19/22  2104 03/20/22  0034 03/20/22  0056 03/20/22  0207 03/20/22  0742   POCTGLUCOSE 167* 97 90 113* 95 184* 57* 79 121* 107       Current Medications and/or Treatments Impacting Glycemic Control  Immunotherapy:    Immunosuppressants       None          Steroids:   Hormones (From admission, onward)                Start     Stop Route Frequency Ordered    03/18/22 1741  melatonin tablet 6 mg         -- Oral Nightly PRN 03/18/22 1647          Pressors:    Autonomic Drugs (From admission, onward)                None          Hyperglycemia/Diabetes Medications:   Antihyperglycemics (From admission, onward)                Start     Stop Route Frequency Ordered    03/20/22 1130  insulin aspart U-100 pen 3 Units         -- SubQ 3 times daily with meals 03/20/22 0939    03/19/22 1018  insulin aspart U-100 pen 0-5 Units         -- SubQ Before meals & nightly PRN 03/19/22 0918            ASSESSMENT and PLAN    Type 2 diabetes mellitus with hyperglycemia, with long-term current use of insulin  Endocrinology consulted for BG management.   BG goal  140-180    20% reduction in most recent regimen started by Dr. Jameson    - NAY/JOSE ALBERTO Levemir Flex Pen 12 units daily (Patient's BG 54 at midnight; treated for hypoglycemia and still awoke with FBG below goal)  - Novolog (aspart) insulin 3 Units SQ TIDWM and prn for BG excursions /50 SSI (30% reduction due to prandial BG below goal).   - BG checks AC/HS      ** Please notify Endocrine for any change and/or advance in diet**  ** Please call Endocrine for any BG related issues **    Discharge Planning:   TBD. Please notify endocrinology prior to discharge.        Infection of below knee amputation stump  Infection may elevate BG readings  On IV antibiotics  Managed per primary team  Avoid hypoglycemia        Surgical wound present  Optimize BG control to improve wound healing        Uses self-applied continuous glucose monitoring device  - Will resume today  Patient may continue to wear Dexcom CGM, but must have a finger stick BG check AC/HS.   Treatment decision inpatient must be confirmed via fingerstick.   Always confirm hypo and hyperglycemia with finger sticks.              Jacinto Brownlee, DNP, FNP  Endocrinology  [facility]

## 2022-03-20 NOTE — NURSING
Pt complained that she thought her sugar was low. Checked BG it was 57, glucose tablets administered.

## 2022-03-20 NOTE — ASSESSMENT & PLAN NOTE
- Will resume today  Patient may continue to wear Dexcom CGM, but must have a finger stick BG check AC/HS.   Treatment decision inpatient must be confirmed via fingerstick.   Always confirm hypo and hyperglycemia with finger sticks.

## 2022-03-20 NOTE — PT/OT/SLP EVAL
"Occupational Therapy   Evaluation and Discharge Note    Name: Syuapa Connelly  MRN: [id number]  Admitting Diagnosis:  <principal problem not specified>   Recent Surgery: Procedure(s) (LRB):  AMPUTATION, ABOVE KNEE (Right) 2 Days Post-Op    Recommendations:     Discharge Recommendations: home  Discharge Equipment Recommendations:  none  Barriers to discharge:       Assessment:     Suyapa Connelly is a 56 y.o. female with a medical diagnosis of <principal problem not specified>. At this time, patient is functioning at their prior level of function and does not require further acute OT services.     Plan:     During this hospitalization, patient does not require further acute OT services.  Please re-consult if situation changes.    · Plan of Care Reviewed with: patient, spouse    Subjective     Chief Complaint: "I don't want to have to go back to rehab."  Patient/Family Comments/goals: to go home    Occupational Profile:  Living Environment: lives with spouse in [facility] with no NASIR  Previous level of function: Mod (I) with ADL and mobility using w/c  Roles and Routines: caretaker to self; enjoys traveling with   Equipment Used at home:  wheelchair, slide board, bedside commode  Assistance upon Discharge: spouse    Pain/Comfort:  · Pain Rating 1: 5/10  · Location 1:  (residual limb and phantom pain)  · Pain Addressed 1: Distraction  · Pain Rating Post-Intervention 1: 5/10    Patients cultural, spiritual, Shinto conflicts given the current situation: no    Objective:     Communicated with: RN prior to session.  Patient found supine with telemetry, pulse ox (continuous), PCA, peripheral IV, PureWick upon OT entry to room.    General Precautions: Standard, fall   Orthopedic Precautions:    Braces:    Respiratory Status: Room air     Occupational Performance:    Bed Mobility:    · Patient completed Supine to Sit with supervision  · Patient completed Sit to Supine with supervision    Functional " Mobility/Transfers:  · Scooting along EOB with Supervision  · Transfers NT 2* pt's w/c not at bedside and declining t/f to chair 2* residual limb pain    Activities of Daily Living:  · Feeding:  independence    · Grooming: independence    · Upper Body Dressing: independence    · Lower Body Dressing: modified independence    · Toileting: modified independence and bed level for pericare      Cognitive/Visual Perceptual:  Oriented to: Person, Place, Time and Situation  Follows Commands/attention: Follows multistep  commands  Communication: clear/fluent  Memory:  No Deficits noted  Safety awareness/insight to disability: intact  Coping skills/emotional control: Appropriate to situation      Physical Exam:  Postural examination/scapula alignment:    -       No postural abnormalities identified  Sensation:    -       Intact  Upper Extremity Range of Motion:     -       Right Upper Extremity: WFL  -       Left Upper Extremity: WFL  Upper Extremity Strength:    -       Right Upper Extremity: WFL  -       Left Upper Extremity: WFL   Strength:    -       Right Upper Extremity: WFL  -       Left Upper Extremity: WFL  Fine Motor Coordination:    -       Intact  Gross motor coordination:   WFL      AMPAC 6 Click ADL:  AMPAC Total Score: 24    Treatment & Education:  Pt ed on OT POC  Pt's  agreeable to bring w/c to hospital room and assist pt OOB  Education:    Patient left HOB elevated with all lines intact, call button in reach, RN notified and spouse present    GOALS:   Multidisciplinary Problems     Occupational Therapy Goals     Not on file          Multidisciplinary Problems (Resolved)        Problem: Occupational Therapy Goal    Goal Priority Disciplines Outcome Interventions   Occupational Therapy Goal   (Resolved)     OT, PT/OT Met                    History:     Past Medical History:   Diagnosis Date    Anxiety     Chronic pain syndrome     CKD (chronic kidney disease), stage III     Depression      Diabetes mellitus     type 2    Diabetes mellitus, type 2     GERD (gastroesophageal reflux disease)     Hyperemesis 3/23/2021    Hyperlipemia     Hypertension     Hypokalemia 3/23/2021    Myocardial infarction 2010    minor-caused by stress per pt.    Osteomyelitis     Osteomyelitis of left foot 4/30/2021    PVD (peripheral vascular disease)     Vaginal delivery     x1       Past Surgical History:   Procedure Laterality Date    ABOVE-KNEE AMPUTATION Left 5/18/2021    Procedure: AMPUTATION, ABOVE KNEE;  Surgeon: Teddy Huber MD;  Location: [facility] OR [address] [place] [address];  Service: Vascular;  Laterality: Left;    Angiogram - Right Extremity Right 7/9/15    angiogram-left leg  10/6/15    ANGIOGRAPHY OF LOWER EXTREMITY Left 4/29/2021    Procedure: ANGIOGRAM, LOWER EXTREMITY;  Surgeon: Teddy Huber MD;  Location: [facility] OR [address] [place] [address];  Service: Vascular;  Laterality: Left;    BELOW KNEE AMPUTATION OF LOWER EXTREMITY Right 12/28/2021    Procedure: AMPUTATION, BELOW KNEE;  Surgeon: Kaitlyn Rojas MD;  Location: [facility];  Service: General;  Laterality: Right;    CATHETERIZATION OF BOTH LEFT AND RIGHT HEART N/A 12/18/2019    Procedure: CATHETERIZATION, HEART, BOTH LEFT AND RIGHT;  Surgeon: Que Fernando III, MD;  Location: [facility] CATH LAB;  Service: Cardiology;  Laterality: N/A;    CORONARY ANGIOGRAPHY N/A 12/18/2019    Procedure: ANGIOGRAM, CORONARY ARTERY;  Surgeon: Que Fernando III, MD;  Location: [facility] CATH LAB;  Service: Cardiology;  Laterality: N/A;    CORONARY ANGIOGRAPHY INCLUDING BYPASS GRAFTS WITH CATHETERIZATION OF LEFT HEART N/A 7/28/2020    Procedure: ANGIOGRAM, CORONARY, INCLUDING BYPASS GRAFT, WITH LEFT HEART CATHETERIZATION, 9 am;  Surgeon: Rachel Easley MD;  Location: [facility] CATH LAB;  Service: Cardiology;  Laterality: N/A;    CORONARY ARTERY BYPASS GRAFT (CABG) N/A 1/14/2020    Procedure: CORONARY ARTERY BYPASS GRAFT (CABG) x 1     Off Pump;  Surgeon: Huang Altamirano MD;   [facility];  Service: Cardiovascular;  Laterality: N/A;    CREATION OF FEMORAL-TIBIAL ARTERY BYPASS Left 4/29/2021    Procedure: CREATION, BYPASS, ARTERIAL, FEMORAL TO ANTERIOR TIBIAL;  Surgeon: Teddy Huber MD;  [facility];  Service: Vascular;  Laterality: Left;    CREATION OF FEMOROPOPLITEAL ARTERIAL BYPASS USING GRAFT Left 8/18/2020    Procedure: CREATION, BYPASS, ARTERIAL, FEMORAL TO POPLITEAL, USING GRAFT, LEFT LOWER EXTREMITY;  Surgeon: Teddy Huber MD;  Location: [facility] OR;  Service: Vascular;  Laterality: Left;  REQUEST 7:15 A.M. START----COVID NEGATIVE ON 8/17  1ST CASE STARTE PER LEANA ON 8/7/2020 @ 942AM-  RN PREOP 8/12/2020   T/S-----CLEARED BY CARDS-------PENDING INSURANCE    DEBRIDEMENT OF FOOT Left 9/8/2020    Procedure: DEBRIDEMENT, FOOT;  Surgeon: Rosio Mayes DPM;  Location: [facility] OR;  Service: Podiatry;  Laterality: Left;  request neoxx .   RN Pre Op 9-4-2020, Covid negative on 9/5/20. C A    DEBRIDEMENT OF FOOT  3/4/2021    Procedure: DEBRIDEMENT, FOOT;  Surgeon: Teddy Huber MD;  Location: [facility] OR;  Service: Vascular;;    DEBRIDEMENT OF FOOT Left 3/9/2021    Procedure: DEBRIDEMENT, FOOT, bone biopsy;  Surgeon: Rosio Mayes DPM;  Location: [facility] OR;  Service: Podiatry;  Laterality: Left;  Request neoxx---COVID IN AM  REQUESTING NOON START  RN Phone Pre op.On Blood thinners Plavix and Eliquis.  Covid am of surgery. C A    DEBRIDEMENT OF FOOT Left 5/4/2021    Procedure: DEBRIDEMENT, FOOT;  Surgeon: Farooq Morley DPM;  [facility];  Service: Podiatry;  Laterality: Left;    INSERTION OF TUNNELED CENTRAL VENOUS HEMODIALYSIS CATHETER N/A 1/27/2020    Procedure: Insertion, Catheter, Central Venous, Hemodialysis;  Surgeon: ESTEBAN Gomez III, MD;  Location: [facility] CATH LAB;  Service: Peripheral Vascular;  Laterality: N/A;    PERCUTANEOUS TRANSLUMINAL ANGIOPLASTY N/A 3/4/2021    Procedure: PTA (ANGIOPLASTY,  PERCUTANEOUS, TRANSLUMINAL);  Surgeon: Teddy Huber MD;  Location: [facility] OR;  Service: Vascular;  Laterality: N/A;    REMOVAL OF ARTERIOVENOUS GRAFT Left 5/27/2021    Procedure: REMOVAL, GRAFT, LEFT LOWER EXTREMITY, WOUND EXPLORATION;  Surgeon: Teddy Huber MD;  Location: [facility] OR 2ND FLR;  Service: Vascular;  Laterality: Left;    REMOVAL OF NAIL OF DIGIT Left 3/9/2021    Procedure: REMOVAL, NAIL, DIGIT;  Surgeon: Rosio Mayes DPM;  Location: [facility] OR;  Service: Podiatry;  Laterality: Left;    THROMBECTOMY Left 3/4/2021    Procedure: THROMBECTOMY, LEFT LOWER EXTREMITY BYPASS GRAFT, ANGIOGRAM, POSSIBLE INTERVENTION, POSSIBLE LEFT LOWER EXTREMITY BYPASS;  Surgeon: Teddy Huber MD;  Location: [facility] OR;  Service: Vascular;  Laterality: Left;    THROMBECTOMY Left 4/29/2021    Procedure: GRAFT THROMBECTOMY, LEFT LOWER EXTREMITY;  Surgeon: Teddy Huber MD;  Location: [facility] OR 2ND FLR;  Service: Vascular;  Laterality: Left;  14.5 min  1179.85 mGy  341.01 Gycm2  240 ml dye    THROMBECTOMY  10/22/2021    Procedure: THROMBECTOMY;  Surgeon: Saad Arenas MD;  Location: [facility] CATH LAB/EP;  Service: Cardiology;;       Time Tracking:     OT Date of Treatment: 03/20/22  OT Start Time: 1017  OT Stop Time: 1035  OT Total Time (min): 18 min    Billable Minutes:Evaluation 8  Self Care/Home Management 10    3/20/2022

## 2022-03-20 NOTE — ASSESSMENT & PLAN NOTE
56 year old woman with complex medical history.  Now s/p revision RLE BKA to AKA    - Diabetic diet, Boost Glucose Control added   - Dressing and drain removed today, healing well. Re-wrapped with kerlix and ace wrap.  -Endocrine following for insulin management.  Appreciate management.  Glucose under much better control  -Hyperkalemia resolved  -Continue pain management by acute pain service with perineural catheters.  Pain well controlled.  -Home meds reordered, still holding Xarelto  -PT/OT for dispo eval, family and patient wanting to be discharged home if possible as they have completed two stints in rehab

## 2022-03-20 NOTE — PLAN OF CARE
Problem: Occupational Therapy Goal  Goal: Occupational Therapy Goal  Outcome: Met   OT eval completed; no goals established as pt is performing ADL at baseline. ANNY Foster

## 2022-03-20 NOTE — PT/OT/SLP EVAL
Physical Therapy Co-Evaluation and co-treatment with OT    Patient Name:  Suyapa Connelly   MRN:  [id number]    Recommendations:     Discharge Recommendations:   (home no needs)   Discharge Equipment Recommendations: none   Barriers to discharge: None    Assessment:     Suyapa Connelly is a 56 y.o. female admitted with a medical diagnosis of infection residual limb RLE.  She presents with the following impairments/functional limitations:  impaired functional mobilty pt tolerated treatment well and will benefit from a few more sessions of PT  to address transfers from bed to/from w/c. Pt will be able to discharge home with no needs when medically stable. Pt has support system in place. Pt is s/p revision R AKA 3/18/22.    Rehab Prognosis: Good; patient would benefit from acute skilled PT services to address these deficits and reach maximum level of function.    Recent Surgery: Procedure(s) (LRB):  AMPUTATION, ABOVE KNEE (Right) 2 Days Post-Op    Plan:     During this hospitalization, patient to be seen 3 x/week to address the identified rehab impairments via therapeutic activities and progress toward the following goals:    · Plan of Care Expires:  04/18/22    Subjective     Chief Complaint: pt c/o slight pain during treatment.   Patient/Family Comments/goals:  To return home and take care of herself.   Pain/Comfort:  · Pain Rating 1: 5/10 (R residual limb)  · Pain Addressed 1: Distraction  · Pain Rating Post-Intervention 1: 5/10 (R residual limb)    Patients cultural, spiritual, Restorationism conflicts given the current situation: no    Living Environment:  Pt lives with her  in [facility].   Prior to admission, patients level of function was modified independent using w/c for mobility. .  Equipment used at home: bedside commode, wheelchair, slide board.  DME owned (not currently used): none.  Upon discharge, patient will have assistance from  who is her caregiver.    Objective:     Communicated with  nurse prior to session.  Patient found supine with telemetry, blood pressure cuff, PureWick, PCEA, peripheral IV  upon PT entry to room.    General Precautions: Standard, fall   Orthopedic Precautions:    Braces:    Respiratory Status: Room air    Exams:  · Cognitive Exam:  Patient is oriented to Person, Place, Time and Situation  · RLE ROM: WFL  · LLE ROM: WFL    Functional Mobility:  · Bed Mobility:     · Rolling Left:  supervision  · Scooting: supervision to Left side  · Supine to Sit: supervision  · Sit to Supine: supervision  ·   · Balance: pt sat on EOB with supervision and performed ADLS with OT.     Transfers: pt did not have w/c present for transfers.     Due to pt complex medical condition, the skill of 2 licensed therapists is needed to maximize treatment session and progression towards goals.     Therapeutic Activities and Exercises:   pt and  received verbal instructions in role of PT and POC. Both verbally expressed understanding of such.     AM-PAC 6 CLICK MOBILITY  Total Score:12     Patient left supine with all lines intact, call button in reach and  present.    GOALS:   Multidisciplinary Problems     Physical Therapy Goals        Problem: Physical Therapy Goal    Goal Priority Disciplines Outcome Goal Variances Interventions   Physical Therapy Goal     PT, PT/OT Ongoing, Progressing     Description: Goals to be met by: 3/30/22    Patient will increase functional independence with mobility by performin. Bed to chair transfer with Stand-by Assistance using Slideboard if needed.                      History:     Past Medical History:   Diagnosis Date    Anxiety     Chronic pain syndrome     CKD (chronic kidney disease), stage III     Depression     Diabetes mellitus     type 2    Diabetes mellitus, type 2     GERD (gastroesophageal reflux disease)     Hyperemesis 3/23/2021    Hyperlipemia     Hypertension     Hypokalemia 3/23/2021    Myocardial infarction      minor-caused by stress per pt.    Osteomyelitis     Osteomyelitis of left foot 4/30/2021    PVD (peripheral vascular disease)     Vaginal delivery     x1       Past Surgical History:   Procedure Laterality Date    ABOVE-KNEE AMPUTATION Left 5/18/2021    Procedure: AMPUTATION, ABOVE KNEE;  Surgeon: Teddy Huber MD;  Location: [address];  Service: Vascular;  Laterality: Left;    Angiogram - Right Extremity Right 7/9/15    angiogram-left leg  10/6/15    ANGIOGRAPHY OF LOWER EXTREMITY Left 4/29/2021    Procedure: ANGIOGRAM, LOWER EXTREMITY;  Surgeon: Teddy Huber MD;  Location: [address];  Service: Vascular;  Laterality: Left;    BELOW KNEE AMPUTATION OF LOWER EXTREMITY Right 12/28/2021    Procedure: AMPUTATION, BELOW KNEE;  Surgeon: Kaitlyn Rojas MD;  Location: [facility];  Service: General;  Laterality: Right;    CATHETERIZATION OF BOTH LEFT AND RIGHT HEART N/A 12/18/2019    Procedure: CATHETERIZATION, HEART, BOTH LEFT AND RIGHT;  Surgeon: Que Fernando III, MD;  Location: [facility] CATH LAB;  Service: Cardiology;  Laterality: N/A;    CORONARY ANGIOGRAPHY N/A 12/18/2019    Procedure: ANGIOGRAM, CORONARY ARTERY;  Surgeon: Que Fernando III, MD;  Location: [facility] CATH LAB;  Service: Cardiology;  Laterality: N/A;    CORONARY ANGIOGRAPHY INCLUDING BYPASS GRAFTS WITH CATHETERIZATION OF LEFT HEART N/A 7/28/2020    Procedure: ANGIOGRAM, CORONARY, INCLUDING BYPASS GRAFT, WITH LEFT HEART CATHETERIZATION, 9 am;  Surgeon: Rachel Easley MD;  Location: [facility] CATH LAB;  Service: Cardiology;  Laterality: N/A;    CORONARY ARTERY BYPASS GRAFT (CABG) N/A 1/14/2020    Procedure: CORONARY ARTERY BYPASS GRAFT (CABG) x 1     Off Pump;  Surgeon: Huang Altamirano MD;  Location: [address];  Service: Cardiovascular;  Laterality: N/A;    CREATION OF FEMORAL-TIBIAL ARTERY BYPASS Left 4/29/2021    Procedure: CREATION, BYPASS, ARTERIAL, FEMORAL TO ANTERIOR TIBIAL;  Surgeon: Teddy BLANCA  MD Cecile;  [facility];  Service: Vascular;  Laterality: Left;    CREATION OF FEMOROPOPLITEAL ARTERIAL BYPASS USING GRAFT Left 8/18/2020    Procedure: CREATION, BYPASS, ARTERIAL, FEMORAL TO POPLITEAL, USING GRAFT, LEFT LOWER EXTREMITY;  Surgeon: Teddy Huber MD;  Location: [facility];  Service: Vascular;  Laterality: Left;  REQUEST 7:15 A.M. START----COVID NEGATIVE ON 8/17  1ST CASE STARTE PER LEANA ON 8/7/2020 @ 942AM-  RN PREOP 8/12/2020   T/S-----CLEARED BY CARDS-------PENDING INSURANCE    DEBRIDEMENT OF FOOT Left 9/8/2020    Procedure: DEBRIDEMENT, FOOT;  Surgeon: Rosio Mayes DPM;  Location: [facility];  Service: Podiatry;  Laterality: Left;  request neoxx .   RN Pre Op 9-4-2020, Covid negative on 9/5/20. C A    DEBRIDEMENT OF FOOT  3/4/2021    Procedure: DEBRIDEMENT, FOOT;  Surgeon: Teddy Huber MD;  Location: [facility];  Service: Vascular;;    DEBRIDEMENT OF FOOT Left 3/9/2021    Procedure: DEBRIDEMENT, FOOT, bone biopsy;  Surgeon: Rosio Mayes DPM;  Location: [facility] OR;  Service: Podiatry;  Laterality: Left;  Request neoxx---COVID IN AM  REQUESTING NOON START  RN Phone Pre op.On Blood thinners Plavix and Eliquis.  Covid am of surgery. C A    DEBRIDEMENT OF FOOT Left 5/4/2021    Procedure: DEBRIDEMENT, FOOT;  Surgeon: Farooq Morley DPM;  Location: [facility] 2ND FLR;  Service: Podiatry;  Laterality: Left;    INSERTION OF TUNNELED CENTRAL VENOUS HEMODIALYSIS CATHETER N/A 1/27/2020    Procedure: Insertion, Catheter, Central Venous, Hemodialysis;  Surgeon: ESTEBAN Gomez III, MD;  Location: [facility] CATH LAB;  Service: Peripheral Vascular;  Laterality: N/A;    PERCUTANEOUS TRANSLUMINAL ANGIOPLASTY N/A 3/4/2021    Procedure: PTA (ANGIOPLASTY, PERCUTANEOUS, TRANSLUMINAL);  Surgeon: Teddy Huber MD;  Location: [facility] OR;  Service: Vascular;  Laterality: N/A;    REMOVAL OF ARTERIOVENOUS GRAFT Left 5/27/2021    Procedure: REMOVAL, GRAFT, LEFT LOWER EXTREMITY, WOUND  EXPLORATION;  Surgeon: Teddy Huber MD;  Location: [facility] OR 2ND FLR;  Service: Vascular;  Laterality: Left;    REMOVAL OF NAIL OF DIGIT Left 3/9/2021    Procedure: REMOVAL, NAIL, DIGIT;  Surgeon: Rosio Mayes DPM;  Location: [facility] OR;  Service: Podiatry;  Laterality: Left;    THROMBECTOMY Left 3/4/2021    Procedure: THROMBECTOMY, LEFT LOWER EXTREMITY BYPASS GRAFT, ANGIOGRAM, POSSIBLE INTERVENTION, POSSIBLE LEFT LOWER EXTREMITY BYPASS;  Surgeon: Teddy Huber MD;  Location: [facility] OR;  Service: Vascular;  Laterality: Left;    THROMBECTOMY Left 4/29/2021    Procedure: GRAFT THROMBECTOMY, LEFT LOWER EXTREMITY;  Surgeon: Teddy Huber MD;  Location: [facility] OR [facility] FLR;  Service: Vascular;  Laterality: Left;  14.5 min  1179.85 mGy  341.01 Gycm2  240 ml dye    THROMBECTOMY  10/22/2021    Procedure: THROMBECTOMY;  Surgeon: Saad Arenas MD;  Location: [facility] CATH LAB/EP;  Service: Cardiology;;       Time Tracking:     PT Received On: 03/20/22  PT Start Time: 1017     PT Stop Time: 1034  PT Total Time (min): 17 min     Billable Minutes: Evaluation 8 min and Therapeutic Activity 9 min      03/20/2022

## 2022-03-20 NOTE — PLAN OF CARE
Pt is A&O x4. VS remained stable throughout the shift. Pt is on diabetic diet and tolerating well. Pain controlled with PRN meds. AKA incision covered with gauze and ace wrap CDI. ACCU check ACHS, no need to cover, sugar dropped had to give glucose tabs. Pt resting comfortably, bed in low position, call light within reach, WCTM.    Problem: Adjustment to Illness (Sepsis/Septic Shock)  Goal: Optimal Coping  Outcome: Ongoing, Progressing     Problem: Glycemic Control Impaired (Sepsis/Septic Shock)  Goal: Blood Glucose Level Within Desired Range  Outcome: Ongoing, Progressing     Problem: Adult Inpatient Plan of Care  Goal: Plan of Care Review  Outcome: Ongoing, Progressing     Problem: Adult Inpatient Plan of Care  Goal: Patient-Specific Goal (Individualized)  Outcome: Ongoing, Progressing     Problem: Diabetes Comorbidity  Goal: Blood Glucose Level Within Targeted Range  Outcome: Ongoing, Progressing     Problem: Fall Injury Risk  Goal: Absence of Fall and Fall-Related Injury  Outcome: Ongoing, Progressing

## 2022-03-21 ENCOUNTER — ANESTHESIA (OUTPATIENT)
Dept: SURGERY | Facility: HOSPITAL | Age: 57
End: 2022-03-21

## 2022-03-21 ENCOUNTER — ANESTHESIA EVENT (OUTPATIENT)
Dept: SURGERY | Facility: HOSPITAL | Age: 57
End: 2022-03-21

## 2022-03-21 LAB
ANION GAP SERPL CALC-SCNC: 6 MMOL/L (ref 8–16)
BASOPHILS # BLD AUTO: 0.06 K/UL (ref 0–0.2)
BASOPHILS NFR BLD: 0.8 % (ref 0–1.9)
BUN SERPL-MCNC: 37 MG/DL (ref 6–20)
CALCIUM SERPL-MCNC: 8.4 MG/DL (ref 8.7–10.5)
CHLORIDE SERPL-SCNC: 110 MMOL/L (ref 95–110)
CO2 SERPL-SCNC: 24 MMOL/L (ref 23–29)
CREAT SERPL-MCNC: 1.6 MG/DL (ref 0.5–1.4)
DIFFERENTIAL METHOD: ABNORMAL
EOSINOPHIL # BLD AUTO: 0.3 K/UL (ref 0–0.5)
EOSINOPHIL NFR BLD: 3.6 % (ref 0–8)
ERYTHROCYTE [DISTWIDTH] IN BLOOD BY AUTOMATED COUNT: 15.4 % (ref 11.5–14.5)
EST. GFR  (AFRICAN AMERICAN): 41.5 ML/MIN/1.73 M^2
EST. GFR  (NON AFRICAN AMERICAN): 36 ML/MIN/1.73 M^2
GLUCOSE SERPL-MCNC: 114 MG/DL (ref 70–110)
HCT VFR BLD AUTO: 26.4 % (ref 37–48.5)
HGB BLD-MCNC: 8.5 G/DL (ref 12–16)
IMM GRANULOCYTES # BLD AUTO: 0.03 K/UL (ref 0–0.04)
IMM GRANULOCYTES NFR BLD AUTO: 0.4 % (ref 0–0.5)
LYMPHOCYTES # BLD AUTO: 1.7 K/UL (ref 1–4.8)
LYMPHOCYTES NFR BLD: 21.4 % (ref 18–48)
MAGNESIUM SERPL-MCNC: 1.9 MG/DL (ref 1.6–2.6)
MCH RBC QN AUTO: 30.7 PG (ref 27–31)
MCHC RBC AUTO-ENTMCNC: 32.2 G/DL (ref 32–36)
MCV RBC AUTO: 95 FL (ref 82–98)
MONOCYTES # BLD AUTO: 1.1 K/UL (ref 0.3–1)
MONOCYTES NFR BLD: 13.8 % (ref 4–15)
NEUTROPHILS # BLD AUTO: 4.7 K/UL (ref 1.8–7.7)
NEUTROPHILS NFR BLD: 60 % (ref 38–73)
NRBC BLD-RTO: 0 /100 WBC
PHOSPHATE SERPL-MCNC: 2.7 MG/DL (ref 2.7–4.5)
PLATELET # BLD AUTO: 291 K/UL (ref 150–450)
PMV BLD AUTO: 10.6 FL (ref 9.2–12.9)
POCT GLUCOSE: 136 MG/DL (ref 70–110)
POCT GLUCOSE: 154 MG/DL (ref 70–110)
POCT GLUCOSE: 166 MG/DL (ref 70–110)
POCT GLUCOSE: 173 MG/DL (ref 70–110)
POCT GLUCOSE: 214 MG/DL (ref 70–110)
POTASSIUM SERPL-SCNC: 4.7 MMOL/L (ref 3.5–5.1)
RBC # BLD AUTO: 2.77 M/UL (ref 4–5.4)
SODIUM SERPL-SCNC: 140 MMOL/L (ref 136–145)
WBC # BLD AUTO: 7.88 K/UL (ref 3.9–12.7)

## 2022-03-21 PROCEDURE — 99231 PR SUBSEQUENT HOSPITAL CARE,LEVL I: ICD-10-PCS | Mod: ,,, | Performed by: ANESTHESIOLOGY

## 2022-03-21 PROCEDURE — 84100 ASSAY OF PHOSPHORUS: CPT | Performed by: STUDENT IN AN ORGANIZED HEALTH CARE EDUCATION/TRAINING PROGRAM

## 2022-03-21 PROCEDURE — 99231 SBSQ HOSP IP/OBS SF/LOW 25: CPT | Mod: ,,, | Performed by: ANESTHESIOLOGY

## 2022-03-21 PROCEDURE — 20600001 HC STEP DOWN PRIVATE ROOM

## 2022-03-21 PROCEDURE — C9399 UNCLASSIFIED DRUGS OR BIOLOG: HCPCS | Performed by: NURSE PRACTITIONER

## 2022-03-21 PROCEDURE — 94761 N-INVAS EAR/PLS OXIMETRY MLT: CPT

## 2022-03-21 PROCEDURE — 25000003 PHARM REV CODE 250: Performed by: STUDENT IN AN ORGANIZED HEALTH CARE EDUCATION/TRAINING PROGRAM

## 2022-03-21 PROCEDURE — 63600175 PHARM REV CODE 636 W HCPCS: Performed by: STUDENT IN AN ORGANIZED HEALTH CARE EDUCATION/TRAINING PROGRAM

## 2022-03-21 PROCEDURE — 85025 COMPLETE CBC W/AUTO DIFF WBC: CPT | Performed by: STUDENT IN AN ORGANIZED HEALTH CARE EDUCATION/TRAINING PROGRAM

## 2022-03-21 PROCEDURE — 99900035 HC TECH TIME PER 15 MIN (STAT)

## 2022-03-21 PROCEDURE — 94799 UNLISTED PULMONARY SVC/PX: CPT

## 2022-03-21 PROCEDURE — 63600175 PHARM REV CODE 636 W HCPCS: Performed by: NURSE PRACTITIONER

## 2022-03-21 PROCEDURE — 99232 PR SUBSEQUENT HOSPITAL CARE,LEVL II: ICD-10-PCS | Mod: ,,, | Performed by: NURSE PRACTITIONER

## 2022-03-21 PROCEDURE — 99232 SBSQ HOSP IP/OBS MODERATE 35: CPT | Mod: ,,, | Performed by: NURSE PRACTITIONER

## 2022-03-21 PROCEDURE — C1751 CATH, INF, PER/CENT/MIDLINE: HCPCS

## 2022-03-21 PROCEDURE — 25000003 PHARM REV CODE 250: Performed by: NURSE PRACTITIONER

## 2022-03-21 PROCEDURE — 83735 ASSAY OF MAGNESIUM: CPT | Performed by: STUDENT IN AN ORGANIZED HEALTH CARE EDUCATION/TRAINING PROGRAM

## 2022-03-21 PROCEDURE — 80048 BASIC METABOLIC PNL TOTAL CA: CPT | Performed by: STUDENT IN AN ORGANIZED HEALTH CARE EDUCATION/TRAINING PROGRAM

## 2022-03-21 RX ORDER — PREGABALIN 75 MG/1
75 CAPSULE ORAL 2 TIMES DAILY
Status: DISCONTINUED | OUTPATIENT
Start: 2022-03-21 | End: 2022-03-21

## 2022-03-21 RX ORDER — PREGABALIN 75 MG/1
75 CAPSULE ORAL 3 TIMES DAILY
Status: DISCONTINUED | OUTPATIENT
Start: 2022-03-21 | End: 2022-03-22 | Stop reason: HOSPADM

## 2022-03-21 RX ADMIN — HEPARIN SODIUM 5000 UNITS: 5000 INJECTION INTRAVENOUS; SUBCUTANEOUS at 09:03

## 2022-03-21 RX ADMIN — CLOPIDOGREL 75 MG: 75 TABLET, FILM COATED ORAL at 08:03

## 2022-03-21 RX ADMIN — ACETAMINOPHEN 1000 MG: 500 TABLET ORAL at 09:03

## 2022-03-21 RX ADMIN — ACETAMINOPHEN 1000 MG: 500 TABLET ORAL at 01:03

## 2022-03-21 RX ADMIN — INSULIN ASPART 2 UNITS: 100 INJECTION, SOLUTION INTRAVENOUS; SUBCUTANEOUS at 04:03

## 2022-03-21 RX ADMIN — CARVEDILOL 3.12 MG: 3.12 TABLET, FILM COATED ORAL at 09:03

## 2022-03-21 RX ADMIN — ALLOPURINOL 100 MG: 100 TABLET ORAL at 08:03

## 2022-03-21 RX ADMIN — PREGABALIN 75 MG: 75 CAPSULE ORAL at 08:03

## 2022-03-21 RX ADMIN — NIFEDIPINE 30 MG: 30 TABLET, FILM COATED, EXTENDED RELEASE ORAL at 08:03

## 2022-03-21 RX ADMIN — SODIUM CHLORIDE: 0.9 INJECTION, SOLUTION INTRAVENOUS at 09:03

## 2022-03-21 RX ADMIN — PREGABALIN 75 MG: 75 CAPSULE ORAL at 03:03

## 2022-03-21 RX ADMIN — OXYCODONE HYDROCHLORIDE 10 MG: 10 TABLET ORAL at 01:03

## 2022-03-21 RX ADMIN — OXYCODONE HYDROCHLORIDE 10 MG: 10 TABLET ORAL at 09:03

## 2022-03-21 RX ADMIN — Medication 400 MG: at 08:03

## 2022-03-21 RX ADMIN — ACETAMINOPHEN 1000 MG: 500 TABLET ORAL at 05:03

## 2022-03-21 RX ADMIN — INSULIN DETEMIR 6 UNITS: 100 INJECTION, SOLUTION SUBCUTANEOUS at 09:03

## 2022-03-21 RX ADMIN — HEPARIN SODIUM 5000 UNITS: 5000 INJECTION INTRAVENOUS; SUBCUTANEOUS at 01:03

## 2022-03-21 RX ADMIN — INSULIN ASPART 3 UNITS: 100 INJECTION, SOLUTION INTRAVENOUS; SUBCUTANEOUS at 04:03

## 2022-03-21 RX ADMIN — PREGABALIN 75 MG: 75 CAPSULE ORAL at 09:03

## 2022-03-21 RX ADMIN — AMITRIPTYLINE HYDROCHLORIDE 10 MG: 10 TABLET, FILM COATED ORAL at 09:03

## 2022-03-21 RX ADMIN — MIRTAZAPINE 15 MG: 15 TABLET, FILM COATED ORAL at 09:03

## 2022-03-21 RX ADMIN — SODIUM BICARBONATE 650 MG: 650 TABLET ORAL at 08:03

## 2022-03-21 RX ADMIN — SODIUM BICARBONATE 650 MG: 650 TABLET ORAL at 09:03

## 2022-03-21 RX ADMIN — INSULIN ASPART 3 UNITS: 100 INJECTION, SOLUTION INTRAVENOUS; SUBCUTANEOUS at 01:03

## 2022-03-21 RX ADMIN — CARVEDILOL 3.12 MG: 3.12 TABLET, FILM COATED ORAL at 08:03

## 2022-03-21 RX ADMIN — Medication 400 MG: at 09:03

## 2022-03-21 RX ADMIN — ATORVASTATIN CALCIUM 80 MG: 20 TABLET, FILM COATED ORAL at 09:03

## 2022-03-21 RX ADMIN — INSULIN ASPART 3 UNITS: 100 INJECTION, SOLUTION INTRAVENOUS; SUBCUTANEOUS at 08:03

## 2022-03-21 RX ADMIN — FUROSEMIDE 40 MG: 40 TABLET ORAL at 08:03

## 2022-03-21 RX ADMIN — HEPARIN SODIUM 5000 UNITS: 5000 INJECTION INTRAVENOUS; SUBCUTANEOUS at 05:03

## 2022-03-21 RX ADMIN — SERTRALINE HYDROCHLORIDE 50 MG: 50 TABLET ORAL at 08:03

## 2022-03-21 RX ADMIN — OXYCODONE HYDROCHLORIDE 10 MG: 10 TABLET ORAL at 05:03

## 2022-03-21 NOTE — ASSESSMENT & PLAN NOTE
56 year old woman with complex medical history.  Now s/p revision RLE BKA to AKA    - Diabetic diet, Boost Glucose Control added   - healing well re-wrapped with kerlix and ace wrap.  -Endocrine following for insulin management.  Appreciate management.  Glucose under much better control  -Hyperkalemia resolved  -Will discuss with acute pain service to remove perineural catheters today.  Transition to PO meds today.  -Home meds reordered, still holding Xarelto  -PT/OT for dispo eval, family and patient wanting to be discharged home if possible as they have completed two stints in rehab  - will order stump protector and sock from [facility] today    Dispo: possible d/c tomorrow if tolerating transition to PO meds

## 2022-03-21 NOTE — ANESTHESIA POSTPROCEDURE EVALUATION
Anesthesia Post Evaluation    Patient: Suyapa Connelly    Procedure(s) Performed: Procedure(s) (LRB):  AMPUTATION, ABOVE KNEE (Right)    Final Anesthesia Type: general      Patient location during evaluation: PACU  Patient participation: Yes- Able to Participate  Level of consciousness: awake and alert  Post-procedure vital signs: reviewed and stable  Pain management: adequate  Airway patency: patent    PONV status at discharge: No PONV  Anesthetic complications: no      Cardiovascular status: blood pressure returned to baseline  Respiratory status: unassisted  Hydration status: euvolemic  Follow-up not needed.          Vitals Value Taken Time   /83 03/21/22 0841   Temp 36.9 °C (98.5 °F) 03/21/22 0719   Pulse 101 03/21/22 0846   Resp 32 03/21/22 0846   SpO2 99 % 03/21/22 0719   Vitals shown include unvalidated device data.      Event Time   Out of Recovery 17:05:00         Pain/Shannan Score: Pain Rating Prior to Med Admin: 7 (3/21/2022  5:06 AM)  Pain Rating Post Med Admin: 5 (3/21/2022  5:38 AM)

## 2022-03-21 NOTE — PROGRESS NOTES
[facility]  Endocrinology  Progress Note    Admit Date: 3/18/2022     Reason for Consult: Management of T2DM, Hyperglycemia      Surgical Procedure and Date: AMPUTATION, ABOVE KNEE (Right)     Diabetes diagnosis year: 2006     Home Diabetes Medications:   Lantus 18 units nightly   Novolog 6 units before meals - (give this 10-15 minutes before you start eating)  Sliding Scale (second box on the log)  If your blood sugar is:  150-199             give extra 1 units of insulin to yourself.  200-249                             2 units  250-299                              3 units  300-349                             4 units  350-399                             5 units  >400                                  6 units        How often checking glucose at home? 1-3 x day   BG readings on regimen: 100's  Hypoglycemia on the regimen?  No  Missed doses on regimen?  No     Diabetes Complications include:     Hyperglycemia, Foot ulcer   and Other skin ulcer     Complicating diabetes co morbidities:   CKD, PVD, Osteomyelitis, HTN, HLD    Hemoglobin A1C   Date Value Ref Range Status   03/18/2022 7.0 (H) 4.0 - 5.6 % Final     Comment:     ADA Screening Guidelines:  5.7-6.4%  Consistent with prediabetes  >or=6.5%  Consistent with diabetes    High levels of fetal hemoglobin interfere with the HbA1C  assay. Heterozygous hemoglobin variants (HbS, HgC, etc)do  not significantly interfere with this assay.   However, presence of multiple variants may affect accuracy.     12/03/2021 9.6 (H) 4.0 - 5.6 % Final     Comment:     ADA Screening Guidelines:  5.7-6.4%  Consistent with prediabetes  >or=6.5%  Consistent with diabetes    High levels of fetal hemoglobin interfere with the HbA1C  assay. Heterozygous hemoglobin variants (HbS, HgC, etc)do  not significantly interfere with this assay.   However, presence of multiple variants may affect accuracy.     06/18/2021 5.9 (H) 4.0 - 5.6 % Final     Comment:     ADA Screening  "Guidelines:  5.7-6.4%  Consistent with prediabetes  >or=6.5%  Consistent with diabetes    High levels of fetal hemoglobin interfere with the HbA1C  assay. Heterozygous hemoglobin variants (HbS, HgC, etc)do  not significantly interfere with this assay.   However, presence of multiple variants may affect accuracy.             HPI: 57 yo F with multiple medical co-morbidities and an extensive past surgical history most significant for prior L AKA and recent R BKA with subsequent revision for poor healing (- 2022) both secondary to PAD. Patient is currently admitted to the hospital medicine service with osteomyelitis of the R BKA stump with break down of the wound/poor healing. ID is following for antibiotic recommendations. Vascular Surgery was consulted for evaluation of the extremity for possible revision to AKA given the ongoing infection and poor healing. Patient would like to have procedure with our vascular team rather than following up in [place] with her previous surgeon.  Endocrine consulted for hyperglycemia and type 2 diabetes.       Interval HPI:   Overnight events: No acute events overnight. Patient on the [facility] in room 1023/1023 A. Blood glucose stable. BG at goal on current insulin regimen (SSI and prandial insulin). Steroid use- None. 3 Days Post-Op  Renal function- Abnormal- Creatinine 1.6  Vasopressors-  None       Diet diabetic [facility]; 2000 Calorie; Isolation Tray - Regular [place]     Eatin%  Nausea: No  Hypoglycemia and intervention: No  Fever: No  TPN and/or TF: No      BP (!) 182/83   Pulse 86   Temp 98.5 °F (36.9 °C) (Oral)   Resp 15   Ht 5' 5" (1.651 m)   Wt 65.3 kg (144 lb)   LMP 2015 (Exact Date)   SpO2 99%   Breastfeeding No   BMI 23.96 kg/m²     Labs Reviewed and Include    Recent Labs   Lab 22  0504   *   CALCIUM 8.4*      K 4.7   CO2 24      BUN 37*   CREATININE 1.6*     Lab Results   Component Value Date    WBC 7.88 " 03/21/2022    HGB 8.5 (L) 03/21/2022    HCT 26.4 (L) 03/21/2022    MCV 95 03/21/2022     03/21/2022     No results for input(s): TSH, FREET4 in the last 168 hours.  Lab Results   Component Value Date    HGBA1C 7.0 (H) 03/18/2022       Nutritional status:   Body mass index is 23.96 kg/m².  Lab Results   Component Value Date    ALBUMIN 2.2 (L) 03/16/2022    ALBUMIN 2.2 (L) 03/15/2022    ALBUMIN 2.0 (L) 03/14/2022     Lab Results   Component Value Date    PREALBUMIN 10 (L) 02/03/2022    PREALBUMIN 8 (L) 06/07/2021    PREALBUMIN 6 (L) 05/31/2021       Estimated Creatinine Clearance: 35.7 mL/min (A) (based on SCr of 1.6 mg/dL (H)).    Accu-Checks  Recent Labs     03/20/22  0034 03/20/22  0056 03/20/22  0207 03/20/22  0742 03/20/22  1217 03/20/22  1547 03/20/22  1648 03/20/22  2111 03/20/22  2251 03/21/22  0733   POCTGLUCOSE 57* 79 121* 107 173* 153* 133* 122* 154* 136*       Current Medications and/or Treatments Impacting Glycemic Control  Immunotherapy:    Immunosuppressants       None          Steroids:   Hormones (From admission, onward)                Start     Stop Route Frequency Ordered    03/18/22 1741  melatonin tablet 6 mg         -- Oral Nightly PRN 03/18/22 1647          Pressors:    Autonomic Drugs (From admission, onward)                None          Hyperglycemia/Diabetes Medications:   Antihyperglycemics (From admission, onward)                Start     Stop Route Frequency Ordered    03/20/22 1130  insulin aspart U-100 pen 3 Units         -- SubQ 3 times daily with meals 03/20/22 0939    03/19/22 1018  insulin aspart U-100 pen 0-5 Units         -- SubQ Before meals & nightly PRN 03/19/22 0918            ASSESSMENT and PLAN    Type 2 diabetes mellitus with hyperglycemia, with long-term current use of insulin  Endocrinology consulted for BG management.   BG goal 140-180    20% reduction in most recent regimen started by Dr. Jameson    - Novolog (aspart) insulin 3 Units SQ TIDWM and prn for BG  excursions /50 SSI   - BG checks AC/HS      ** Please notify Endocrine for any change and/or advance in diet**  ** Please call Endocrine for any BG related issues **    Discharge Planning:   TBD. Please notify endocrinology prior to discharge.        Infection of below knee amputation stump  Infection may elevate BG readings  On IV antibiotics  Managed per primary team  Avoid hypoglycemia        Surgical wound present  Optimize BG control to improve wound healing        Uses self-applied continuous glucose monitoring device  - Will resume today  Patient may continue to wear Dexcom CGM, but must have a finger stick BG check AC/HS.   Treatment decision inpatient must be confirmed via fingerstick.   Always confirm hypo and hyperglycemia with finger sticks.              Jacinto Brownlee, DNP, FNP  Endocrinology  [facility]

## 2022-03-21 NOTE — PLAN OF CARE
POC reviewed with pt and spouse, verbalized understanding. AAOx4. VSS on RA.   -Tolerating Regular diet, denies n/v.   -0 BM during shift. Purewick in placel, adequate UOP.   -Patient is a bilat AKA frequent weight shift encourage, pillows and wedge in use for positioning.  -Rt leg incision, unable to view dressing in place.   Blood sugar checks AC/HS  with sliding scale to cover.  Pain managed with PRN meds. Bed in lowest position, Call light within reach. WCTM.

## 2022-03-21 NOTE — PLAN OF CARE
Pt is A&O x4. VS remained stable throughout the shift. Pt is on diabetic diet and tolerating well. Pain controlled with PRN meds and ropivacaine drip. AKA incision covered with gauze and ace wrap CDI. ACCU check ACHS, no need to cover.. Pt resting comfortably, bed in low position, call light within reach, WCTM.    Problem: Adjustment to Illness (Sepsis/Septic Shock)  Goal: Optimal Coping  Outcome: Ongoing, Progressing     Problem: Glycemic Control Impaired (Sepsis/Septic Shock)  Goal: Blood Glucose Level Within Desired Range  Outcome: Ongoing, Progressing     Problem: Adult Inpatient Plan of Care  Goal: Plan of Care Review  Outcome: Ongoing, Progressing     Problem: Adult Inpatient Plan of Care  Goal: Patient-Specific Goal (Individualized)  Outcome: Ongoing, Progressing     Problem: Diabetes Comorbidity  Goal: Blood Glucose Level Within Targeted Range  Outcome: Ongoing, Progressing     Problem: Fall Injury Risk  Goal: Absence of Fall and Fall-Related Injury  Outcome: Ongoing, Progressing

## 2022-03-21 NOTE — SUBJECTIVE & OBJECTIVE
NAEO. Doing well. Dressing removed today, slight amount of ss drainage from staple line. Pain controlled well on current regimen.  Medications:  Continuous Infusions:   sodium chloride 0.9% 100 mL/hr at 03/20/22 1401    ROPIvacaine (PF) 2 mg/ml (0.2%)       Scheduled Meds:   acetaminophen  1,000 mg Oral Q8H    allopurinoL  100 mg Oral Daily    atorvastatin  80 mg Oral QHS    carvediloL  3.125 mg Oral BID    clopidogreL  75 mg Oral Daily    furosemide  40 mg Oral Daily    heparin (porcine)  5,000 Units Subcutaneous Q8H    insulin aspart U-100  3 Units Subcutaneous TIDWM    magnesium oxide  400 mg Oral BID    mirtazapine  15 mg Oral Nightly    NIFEdipine  30 mg Oral Daily    pregabalin  75 mg Oral QHS    sertraline  50 mg Oral Daily    sodium bicarbonate  650 mg Oral BID     PRN Meds:sodium chloride, amitriptyline, dextrose 10%, dextrose 10%, glucagon (human recombinant), glucose, glucose, insulin aspart U-100, melatonin, ondansetron, oxyCODONE, oxyCODONE, sodium chloride 0.9%     Objective:     Vital Signs (Most Recent):  Temp: 99 °F (37.2 °C) (03/21/22 0445)  Pulse: 98 (03/21/22 0445)  Resp: 16 (03/21/22 0505)  BP: (!) 166/79 (03/21/22 0445)  SpO2: 98 % (03/21/22 0445)   Vital Signs (24h Range):  Temp:  [98.2 °F (36.8 °C)-99.1 °F (37.3 °C)] 99 °F (37.2 °C)  Pulse:  [93-99] 98  Resp:  [15-20] 16  SpO2:  [94 %-98 %] 98 %  BP: (129-170)/(58-85) 166/79         Physical Exam  Constitutional:       General: She is not in acute distress.     Appearance: She is obese.   HENT:      Head: Normocephalic.      Mouth/Throat:      Mouth: Mucous membranes are moist.   Eyes:      Pupils: Pupils are equal, round, and reactive to light.   Cardiovascular:      Rate and Rhythm: Normal rate and regular rhythm.   Abdominal:      General: There is no distension.      Tenderness: There is no abdominal tenderness.   Musculoskeletal:      Comments: RLE amputation site with dressings CDI. Small amount of ss drainage from staple line.     Neurological:      Mental Status: She is alert.       Significant Labs:  CBC:   Recent Labs   Lab 03/21/22  0504   WBC 7.88   RBC 2.77*   HGB 8.5*   HCT 26.4*      MCV 95   MCH 30.7   MCHC 32.2     CMP:   Recent Labs   Lab 03/21/22  0504   *   CALCIUM 8.4*      K 4.7   CO2 24      BUN 37*   CREATININE 1.6*       Significant Diagnostics:  I have reviewed all pertinent imaging results/findings within the past 24 hours.

## 2022-03-21 NOTE — PROGRESS NOTES
[facility]  Vascular Surgery  Progress Note    Patient Name: Suyapa Connelly  MRN: [id number]  Admission Date: 3/18/2022  Primary Care Provider: Magne Christensen MD    Subjective:       Post-Op Info:  Procedure(s) (LRB):  AMPUTATION, ABOVE KNEE (Right)   3 Days Post-Op     Interval History:  NAEO. Doing well. Dressing removed today, slight amount of ss drainage from staple line. Pain controlled well on current regimen.  Medications:  Continuous Infusions:   sodium chloride 0.9% 100 mL/hr at 03/20/22 1401    ROPIvacaine (PF) 2 mg/ml (0.2%)       Scheduled Meds:   acetaminophen  1,000 mg Oral Q8H    allopurinoL  100 mg Oral Daily    atorvastatin  80 mg Oral QHS    carvediloL  3.125 mg Oral BID    clopidogreL  75 mg Oral Daily    furosemide  40 mg Oral Daily    heparin (porcine)  5,000 Units Subcutaneous Q8H    insulin aspart U-100  3 Units Subcutaneous TIDWM    magnesium oxide  400 mg Oral BID    mirtazapine  15 mg Oral Nightly    NIFEdipine  30 mg Oral Daily    pregabalin  75 mg Oral QHS    sertraline  50 mg Oral Daily    sodium bicarbonate  650 mg Oral BID     PRN Meds:sodium chloride, amitriptyline, dextrose 10%, dextrose 10%, glucagon (human recombinant), glucose, glucose, insulin aspart U-100, melatonin, ondansetron, oxyCODONE, oxyCODONE, sodium chloride 0.9%     Objective:     Vital Signs (Most Recent):  Temp: 99 °F (37.2 °C) (03/21/22 0445)  Pulse: 98 (03/21/22 0445)  Resp: 16 (03/21/22 0505)  BP: (!) 166/79 (03/21/22 0445)  SpO2: 98 % (03/21/22 0445)   Vital Signs (24h Range):  Temp:  [98.2 °F (36.8 °C)-99.1 °F (37.3 °C)] 99 °F (37.2 °C)  Pulse:  [93-99] 98  Resp:  [15-20] 16  SpO2:  [94 %-98 %] 98 %  BP: (129-170)/(58-85) 166/79         Physical Exam  Constitutional:       General: She is not in acute distress.     Appearance: She is obese.   HENT:      Head: Normocephalic.      Mouth/Throat:      Mouth: Mucous membranes are moist.   Eyes:      Pupils: Pupils are equal, round, and reactive  to light.   Cardiovascular:      Rate and Rhythm: Normal rate and regular rhythm.   Abdominal:      General: There is no distension.      Tenderness: There is no abdominal tenderness.   Musculoskeletal:      Comments: RLE amputation site with dressings CDI. Small amount of ss drainage from staple line.    Neurological:      Mental Status: She is alert.       Significant Labs:  CBC:   Recent Labs   Lab 03/21/22  0504   WBC 7.88   RBC 2.77*   HGB 8.5*   HCT 26.4*      MCV 95   MCH 30.7   MCHC 32.2     CMP:   Recent Labs   Lab 03/21/22  0504   *   CALCIUM 8.4*      K 4.7   CO2 24      BUN 37*   CREATININE 1.6*       Significant Diagnostics:  I have reviewed all pertinent imaging results/findings within the past 24 hours.    Assessment/Plan:     Infection of below knee amputation stump  56 year old woman with complex medical history.  Now s/p revision RLE BKA to AKA    - Diabetic diet, Boost Glucose Control added   - healing well re-wrapped with kerlix and ace wrap.  -Endocrine following for insulin management.  Appreciate management.  Glucose under much better control  -Hyperkalemia resolved  -Will discuss with acute pain service to remove perineural catheters today.  Transition to PO meds today.  -Home meds reordered, still holding Xarelto  -PT/OT for dispo eval, family and patient wanting to be discharged home if possible as they have completed two stints in rehab  - will order stump protector and sock from [facility] today    Dispo: possible d/c tomorrow if tolerating transition to PO meds            Catrachito Macias MD  Vascular Surgery  [facility]

## 2022-03-21 NOTE — ASSESSMENT & PLAN NOTE
Endocrinology consulted for BG management.   BG goal 140-180    20% reduction in most recent regimen started by Dr. Jameson    - Novolog (aspart) insulin 3 Units SQ TIDWM and prn for BG excursions /50 SSI   - BG checks AC/HS      ** Please notify Endocrine for any change and/or advance in diet**  ** Please call Endocrine for any BG related issues **    Discharge Planning:   TBD. Please notify endocrinology prior to discharge.

## 2022-03-21 NOTE — ANESTHESIA POST-OP PAIN MANAGEMENT
Acute Pain Service Progress Note    Suyapa Connelly is a 56 y.o., female, [id number].    Surgery:  AMPUTATION, ABOVE KNEE (Right Leg Upper)    Post Op Day #: 3    Catheter type: perineural  femoral    Infusion type: Ropivacaine 0.2%  10 mL/hr basal w/ 5 mL/q30m DB    Problem List:    Active Hospital Problems    Diagnosis  POA    Uses self-applied continuous glucose monitoring device [Z97.8]  Unknown    Surgical wound present [T14.8XXA]  Unknown    Infection of below knee amputation stump [T87.40]  Yes    Type 2 diabetes mellitus with hyperglycemia, with long-term current use of insulin [E11.65, Z79.4]  Not Applicable      Resolved Hospital Problems   No resolved problems to display.       Subjective:     General appearance of alert, oriented, no complaints   Pain with rest: 1    Faces   Pain with movement: 2    Faces   Side Effects    1. Pruritis No    2. Nausea No    3. Motor Blockade No, 0=Ability to raise lower extremities off bed    4. Sedation No, 1=awake and alert    Objective:   Catheter site clean, dry, intact      Vitals   Vitals:    03/21/22 0719   BP: (!) 182/83   Pulse: 91   Resp: 18   Temp: 36.9 °C (98.5 °F)        Labs    No results displayed because visit has over 200 results.           Meds   Current Facility-Administered Medications   Medication Dose Route Frequency Provider Last Rate Last Admin    0.9%  NaCl infusion (for blood administration)   Intravenous Q24H PRN Godwin Ramires MD        0.9%  NaCl infusion   Intravenous Continuous Rizwan Myles  mL/hr at 03/20/22 1401 New Bag at 03/20/22 1401    acetaminophen tablet 1,000 mg  1,000 mg Oral Q8H Rizwan Myles MD   1,000 mg at 03/21/22 0506    allopurinoL tablet 100 mg  100 mg Oral Daily Rizwan Myles MD   100 mg at 03/20/22 1001    amitriptyline tablet 10 mg  10 mg Oral Nightly PRN David Kelley MD   10 mg at 03/20/22 2126    atorvastatin tablet 80 mg  80 mg Oral QHS Rizwan Myles MD   80 mg at 03/20/22 2113     carvediloL tablet 3.125 mg  3.125 mg Oral BID Rizwan Myles MD   3.125 mg at 03/20/22 2114    clopidogreL tablet 75 mg  75 mg Oral Daily Genny Aldana MD   75 mg at 03/20/22 1002    dextrose 10% bolus 125 mL  12.5 g Intravenous PRN DAYNE Florez II, MD        dextrose 10% bolus 250 mL  25 g Intravenous PRN DAYNE Florez II, MD        furosemide tablet 40 mg  40 mg Oral Daily Rizwan Myles MD   40 mg at 03/20/22 1003    glucagon (human recombinant) injection 1 mg  1 mg Intramuscular PRN Jacinto Brownlee DNP, ADRIAN        glucose chewable tablet 16 g  16 g Oral PRN Jacinto Brownlee DNP, FNP   16 g at 03/20/22 0039    glucose chewable tablet 24 g  24 g Oral PRN Jacinto Brownlee DNP, FNP        heparin (porcine) injection 5,000 Units  5,000 Units Subcutaneous Q8H Rizwan Myels MD   5,000 Units at 03/21/22 0505    insulin aspart U-100 pen 0-5 Units  0-5 Units Subcutaneous QID (AC + HS) PRN Jacinto Brownlee DNP, FNP        insulin aspart U-100 pen 3 Units  3 Units Subcutaneous TIDWM Jacinto Brownlee DNP, FNP   3 Units at 03/20/22 1715    magnesium oxide tablet 400 mg  400 mg Oral BID Rizwan Myles MD   400 mg at 03/20/22 2113    melatonin tablet 6 mg  6 mg Oral Nightly PRN Rizwan Myles MD   6 mg at 03/18/22 2152    mirtazapine tablet 15 mg  15 mg Oral Nightly Rizwan Myles MD   15 mg at 03/20/22 2113    NIFEdipine 24 hr tablet 30 mg  30 mg Oral Daily Rizwan Myles MD   30 mg at 03/20/22 1003    ondansetron injection 4 mg  4 mg Intravenous Q12H PRN Rizwan Myles MD        oxyCODONE immediate release tablet 5 mg  5 mg Oral Q4H PRN David Kelley MD        oxyCODONE immediate release tablet Tab 10 mg  10 mg Oral Q4H PRN David Kelley MD   10 mg at 03/21/22 0505    pregabalin capsule 75 mg  75 mg Oral BID Yony Salinas MD        ROPIvacaine (PF) 2 mg/ml (0.2%) solution   Perineural Continuous Sofia Iyer MD   200 mL at  03/20/22 0953    sertraline tablet 50 mg  50 mg Oral Daily Rizwan Myles MD   50 mg at 03/20/22 1002    sodium bicarbonate tablet 650 mg  650 mg Oral BID Rizwan Myles MD   650 mg at 03/20/22 2113    sodium chloride 0.9% flush 3 mL  3 mL Intravenous PRN Rizwan Myles MD           Assessment:     Pain control adequate    Plan:   - Paused PNC this morning, possible d/c in afternoon per patient pending PT/OT recs   - Continue scheduled Tylenol, Lyrica - increased to 75mg po BID for phantom pain    - Continue PRN oxycodone 5 and 10mg   - Continue PRN elavil 10mg nightly   - Pt expressed some symptoms of depression given her now double amputated status, she would like to speak with psychiatry, primary team notified to consult.       Yoyn Salinas MD  Resident Physician, PGY4  Department of Anesthesiology

## 2022-03-21 NOTE — SUBJECTIVE & OBJECTIVE
"Interval HPI:   Overnight events: No acute events overnight. Patient on the GISSU in room 1023/1023 A. Blood glucose stable. BG at goal on current insulin regimen (SSI and prandial insulin). Steroid use- None. 3 Days Post-Op  Renal function- Abnormal- Creatinine 1.6  Vasopressors-  None       Diet diabetic [facility]; 2000 Calorie; Isolation Tray - Regular [place]     Eatin%  Nausea: No  Hypoglycemia and intervention: No  Fever: No  TPN and/or TF: No      BP (!) 182/83   Pulse 86   Temp 98.5 °F (36.9 °C) (Oral)   Resp 15   Ht 5' 5" (1.651 m)   Wt 65.3 kg (144 lb)   LMP 2015 (Exact Date)   SpO2 99%   Breastfeeding No   BMI 23.96 kg/m²     Labs Reviewed and Include    Recent Labs   Lab 22  0504   *   CALCIUM 8.4*      K 4.7   CO2 24      BUN 37*   CREATININE 1.6*     Lab Results   Component Value Date    WBC 7.88 2022    HGB 8.5 (L) 2022    HCT 26.4 (L) 2022    MCV 95 2022     2022     No results for input(s): TSH, FREET4 in the last 168 hours.  Lab Results   Component Value Date    HGBA1C 7.0 (H) 2022       Nutritional status:   Body mass index is 23.96 kg/m².  Lab Results   Component Value Date    ALBUMIN 2.2 (L) 2022    ALBUMIN 2.2 (L) 03/15/2022    ALBUMIN 2.0 (L) 2022     Lab Results   Component Value Date    PREALBUMIN 10 (L) 2022    PREALBUMIN 8 (L) 2021    PREALBUMIN 6 (L) 2021       Estimated Creatinine Clearance: 35.7 mL/min (A) (based on SCr of 1.6 mg/dL (H)).    Accu-Checks  Recent Labs     22  0034 22  0056 22  0207 22  0742 22  1217 22  1547 22  1648 221 22  2251 22  0733   POCTGLUCOSE 57* 79 121* 107 173* 153* 133* 122* 154* 136*       Current Medications and/or Treatments Impacting Glycemic Control  Immunotherapy:    Immunosuppressants       None          Steroids:   Hormones (From admission, onward)                " Start     Stop Route Frequency Ordered    03/18/22 1741  melatonin tablet 6 mg         -- Oral Nightly PRN 03/18/22 1647          Pressors:    Autonomic Drugs (From admission, onward)                None          Hyperglycemia/Diabetes Medications:   Antihyperglycemics (From admission, onward)                Start     Stop Route Frequency Ordered    03/20/22 1130  insulin aspart U-100 pen 3 Units         -- SubQ 3 times daily with meals 03/20/22 0939    03/19/22 1018  insulin aspart U-100 pen 0-5 Units         -- SubQ Before meals & nightly PRN 03/19/22 0918

## 2022-03-21 NOTE — CONSULTS
[facility]  Wound Care    Patient Name:  Suyapa Connelly   MRN:  [id number]  Date: 3/21/2022  Diagnosis: <principal problem not specified>    History:     Past Medical History:   Diagnosis Date    Anxiety     Chronic pain syndrome     CKD (chronic kidney disease), stage III     Depression     Diabetes mellitus     type 2    Diabetes mellitus, type 2     GERD (gastroesophageal reflux disease)     Hyperemesis 3/23/2021    Hyperlipemia     Hypertension     Hypokalemia 3/23/2021    Myocardial infarction 2010    minor-caused by stress per pt.   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      Social Determinants of Health     Financial Resource Strain: High Risk    Difficulty of Paying Living Expenses: Hard   Food Insecurity: No Food Insecurity    Worried About Running Out of Food in the Last Year: Never true    Ran Out of Food in the Last Year: Never true   Transportation Needs: No Transportation Needs    Lack of Transportation (Medical): No    Lack of Transportation (Non-Medical): No   Stress: Stress Concern Present    Feeling of Stress : To some extent   Social Connections: Unknown    Frequency of Communication with Friends and Family: More than three times a week    Frequency of Social Gatherings with Friends and Family: Three times a week    Marital Status:    Housing Stability: Low Risk     Unable to Pay for Housing in the Last Year: No    Number of Places Lived in the Last Year: 1     Unstable Housing in the Last Year: No       Precautions:     Allergies as of 03/18/2022 - Reviewed 03/18/2022   Allergen Reaction Noted    Ciprofloxacin Itching 07/18/2020    Contrast media  01/10/2020    Iodine  01/10/2020       WO Assessment Details/Treatment     Wound care consult received from RN for assessment of coccyx. Patient known to wound care team from previous admissions. Patient reports that she has been managing affected area at home with moisture barrier paste/ moisture barrier ointment. Upon assessment to coccyx noted healing skin injury previously documented as an unstageable pressure injury and complicated by moisture. No open area noted at this time.     Recommend continuing with moisture barrier ointment BID to prevent any new skin breakdown. Nursing and MD notified. Orders placed. Nursing to maintain pressure injury prevention interventions to include waffle overlay. Wound care to assist with pt prn.       03/21/2022

## 2022-03-21 NOTE — PROGRESS NOTES
Pt resting comfortably. Right femoral PNC infusion has been paused. Dressing CDI.  Catheter discontinued, tip intact.  Pt tolerated well.  Educated regarding continued pain management.  Understanding verbalized.

## 2022-03-22 ENCOUNTER — PATIENT OUTREACH (OUTPATIENT)
Dept: ADMINISTRATIVE | Facility: OTHER | Age: 57
End: 2022-03-22
Payer: MEDICARE

## 2022-03-22 VITALS
RESPIRATION RATE: 15 BRPM | OXYGEN SATURATION: 100 % | HEART RATE: 80 BPM | SYSTOLIC BLOOD PRESSURE: 140 MMHG | DIASTOLIC BLOOD PRESSURE: 67 MMHG | WEIGHT: 144 LBS | TEMPERATURE: 98 F | HEIGHT: 65 IN | BODY MASS INDEX: 23.99 KG/M2

## 2022-03-22 LAB
ANION GAP SERPL CALC-SCNC: 6 MMOL/L (ref 8–16)
BASOPHILS # BLD AUTO: 0.07 K/UL (ref 0–0.2)
BASOPHILS NFR BLD: 0.8 % (ref 0–1.9)
BUN SERPL-MCNC: 36 MG/DL (ref 6–20)
CALCIUM SERPL-MCNC: 8.8 MG/DL (ref 8.7–10.5)
CHLORIDE SERPL-SCNC: 107 MMOL/L (ref 95–110)
CO2 SERPL-SCNC: 23 MMOL/L (ref 23–29)
CREAT SERPL-MCNC: 1.5 MG/DL (ref 0.5–1.4)
DIFFERENTIAL METHOD: ABNORMAL
EOSINOPHIL # BLD AUTO: 0.4 K/UL (ref 0–0.5)
EOSINOPHIL NFR BLD: 5.2 % (ref 0–8)
ERYTHROCYTE [DISTWIDTH] IN BLOOD BY AUTOMATED COUNT: 15.2 % (ref 11.5–14.5)
EST. GFR  (AFRICAN AMERICAN): 44.9 ML/MIN/1.73 M^2
EST. GFR  (NON AFRICAN AMERICAN): 38.9 ML/MIN/1.73 M^2
GLUCOSE SERPL-MCNC: 108 MG/DL (ref 70–110)
HCT VFR BLD AUTO: 28.2 % (ref 37–48.5)
HGB BLD-MCNC: 8.8 G/DL (ref 12–16)
IMM GRANULOCYTES # BLD AUTO: 0.04 K/UL (ref 0–0.04)
IMM GRANULOCYTES NFR BLD AUTO: 0.5 % (ref 0–0.5)
LYMPHOCYTES # BLD AUTO: 2.2 K/UL (ref 1–4.8)
LYMPHOCYTES NFR BLD: 25.6 % (ref 18–48)
MAGNESIUM SERPL-MCNC: 1.9 MG/DL (ref 1.6–2.6)
MCH RBC QN AUTO: 30.3 PG (ref 27–31)
MCHC RBC AUTO-ENTMCNC: 31.2 G/DL (ref 32–36)
MCV RBC AUTO: 97 FL (ref 82–98)
MONOCYTES # BLD AUTO: 1 K/UL (ref 0.3–1)
MONOCYTES NFR BLD: 12.1 % (ref 4–15)
NEUTROPHILS # BLD AUTO: 4.7 K/UL (ref 1.8–7.7)
NEUTROPHILS NFR BLD: 55.8 % (ref 38–73)
NRBC BLD-RTO: 0 /100 WBC
PHOSPHATE SERPL-MCNC: 3.3 MG/DL (ref 2.7–4.5)
PLATELET # BLD AUTO: 348 K/UL (ref 150–450)
PMV BLD AUTO: 10.4 FL (ref 9.2–12.9)
POCT GLUCOSE: 134 MG/DL (ref 70–110)
POCT GLUCOSE: 158 MG/DL (ref 70–110)
POTASSIUM SERPL-SCNC: 4.8 MMOL/L (ref 3.5–5.1)
RBC # BLD AUTO: 2.9 M/UL (ref 4–5.4)
SODIUM SERPL-SCNC: 136 MMOL/L (ref 136–145)
WBC # BLD AUTO: 8.44 K/UL (ref 3.9–12.7)

## 2022-03-22 PROCEDURE — 99232 SBSQ HOSP IP/OBS MODERATE 35: CPT | Mod: ,,, | Performed by: NURSE PRACTITIONER

## 2022-03-22 PROCEDURE — 97530 THERAPEUTIC ACTIVITIES: CPT | Mod: CQ

## 2022-03-22 PROCEDURE — 90834 PR PSYCHOTHERAPY W/PATIENT, 45 MIN: ICD-10-PCS | Mod: ,,, | Performed by: STUDENT IN AN ORGANIZED HEALTH CARE EDUCATION/TRAINING PROGRAM

## 2022-03-22 PROCEDURE — 84100 ASSAY OF PHOSPHORUS: CPT | Performed by: STUDENT IN AN ORGANIZED HEALTH CARE EDUCATION/TRAINING PROGRAM

## 2022-03-22 PROCEDURE — 63600175 PHARM REV CODE 636 W HCPCS: Performed by: STUDENT IN AN ORGANIZED HEALTH CARE EDUCATION/TRAINING PROGRAM

## 2022-03-22 PROCEDURE — 25000003 PHARM REV CODE 250: Performed by: STUDENT IN AN ORGANIZED HEALTH CARE EDUCATION/TRAINING PROGRAM

## 2022-03-22 PROCEDURE — 80048 BASIC METABOLIC PNL TOTAL CA: CPT | Performed by: STUDENT IN AN ORGANIZED HEALTH CARE EDUCATION/TRAINING PROGRAM

## 2022-03-22 PROCEDURE — 90834 PSYTX W PT 45 MINUTES: CPT | Mod: ,,, | Performed by: STUDENT IN AN ORGANIZED HEALTH CARE EDUCATION/TRAINING PROGRAM

## 2022-03-22 PROCEDURE — 85025 COMPLETE CBC W/AUTO DIFF WBC: CPT | Performed by: STUDENT IN AN ORGANIZED HEALTH CARE EDUCATION/TRAINING PROGRAM

## 2022-03-22 PROCEDURE — 83735 ASSAY OF MAGNESIUM: CPT | Performed by: STUDENT IN AN ORGANIZED HEALTH CARE EDUCATION/TRAINING PROGRAM

## 2022-03-22 PROCEDURE — 99232 PR SUBSEQUENT HOSPITAL CARE,LEVL II: ICD-10-PCS | Mod: ,,, | Performed by: NURSE PRACTITIONER

## 2022-03-22 RX ORDER — AMITRIPTYLINE HYDROCHLORIDE 10 MG/1
10 TABLET, FILM COATED ORAL NIGHTLY PRN
Qty: 30 TABLET | Refills: 12 | Status: ON HOLD | OUTPATIENT
Start: 2022-03-22 | End: 2023-05-02 | Stop reason: CLARIF

## 2022-03-22 RX ORDER — PREGABALIN 75 MG/1
75 CAPSULE ORAL 3 TIMES DAILY
Qty: 90 CAPSULE | Refills: 6 | Status: ON HOLD | OUTPATIENT
Start: 2022-03-22 | End: 2023-05-02 | Stop reason: CLARIF

## 2022-03-22 RX ORDER — OXYCODONE HYDROCHLORIDE 5 MG/1
5 TABLET ORAL EVERY 6 HOURS PRN
Qty: 28 TABLET | Refills: 0 | Status: SHIPPED | OUTPATIENT
Start: 2022-03-22 | End: 2023-04-21

## 2022-03-22 RX ADMIN — OXYCODONE HYDROCHLORIDE 10 MG: 10 TABLET ORAL at 05:03

## 2022-03-22 RX ADMIN — ALLOPURINOL 100 MG: 100 TABLET ORAL at 08:03

## 2022-03-22 RX ADMIN — NIFEDIPINE 30 MG: 30 TABLET, FILM COATED, EXTENDED RELEASE ORAL at 08:03

## 2022-03-22 RX ADMIN — SODIUM BICARBONATE 650 MG: 650 TABLET ORAL at 08:03

## 2022-03-22 RX ADMIN — Medication 400 MG: at 08:03

## 2022-03-22 RX ADMIN — CLOPIDOGREL 75 MG: 75 TABLET, FILM COATED ORAL at 08:03

## 2022-03-22 RX ADMIN — FUROSEMIDE 40 MG: 40 TABLET ORAL at 08:03

## 2022-03-22 RX ADMIN — INSULIN ASPART 3 UNITS: 100 INJECTION, SOLUTION INTRAVENOUS; SUBCUTANEOUS at 12:03

## 2022-03-22 RX ADMIN — SERTRALINE HYDROCHLORIDE 50 MG: 50 TABLET ORAL at 08:03

## 2022-03-22 RX ADMIN — CARVEDILOL 3.12 MG: 3.12 TABLET, FILM COATED ORAL at 08:03

## 2022-03-22 RX ADMIN — HEPARIN SODIUM 5000 UNITS: 5000 INJECTION INTRAVENOUS; SUBCUTANEOUS at 05:03

## 2022-03-22 RX ADMIN — OXYCODONE HYDROCHLORIDE 10 MG: 10 TABLET ORAL at 11:03

## 2022-03-22 RX ADMIN — PREGABALIN 75 MG: 75 CAPSULE ORAL at 08:03

## 2022-03-22 RX ADMIN — INSULIN ASPART 3 UNITS: 100 INJECTION, SOLUTION INTRAVENOUS; SUBCUTANEOUS at 08:03

## 2022-03-22 RX ADMIN — ACETAMINOPHEN 1000 MG: 500 TABLET ORAL at 05:03

## 2022-03-22 NOTE — SUBJECTIVE & OBJECTIVE
Medications:  Continuous Infusions:  Scheduled Meds:   acetaminophen  1,000 mg Oral Q8H    allopurinoL  100 mg Oral Daily    atorvastatin  80 mg Oral QHS    carvediloL  3.125 mg Oral BID    clopidogreL  75 mg Oral Daily    furosemide  40 mg Oral Daily    heparin (porcine)  5,000 Units Subcutaneous Q8H    insulin aspart U-100  3 Units Subcutaneous TIDWM    insulin detemir U-100  6 Units Subcutaneous QHS    magnesium oxide  400 mg Oral BID    mirtazapine  15 mg Oral Nightly    NIFEdipine  30 mg Oral Daily    pregabalin  75 mg Oral TID    sertraline  50 mg Oral Daily    sodium bicarbonate  650 mg Oral BID     PRN Meds:sodium chloride, amitriptyline, dextrose 10%, dextrose 10%, glucagon (human recombinant), glucose, glucose, insulin aspart U-100, melatonin, ondansetron, oxyCODONE, oxyCODONE, sodium chloride 0.9%     Objective:     Vital Signs (Most Recent):  Temp: 98.1 °F (36.7 °C) (03/22/22 0422)  Pulse: 82 (03/22/22 0422)  Resp: 16 (03/22/22 0500)  BP: (!) 143/69 (03/22/22 0422)  SpO2: 100 % (03/22/22 0422)   Vital Signs (24h Range):  Temp:  [98.1 °F (36.7 °C)-98.8 °F (37.1 °C)] 98.1 °F (36.7 °C)  Pulse:  [] 82  Resp:  [15-24] 16  SpO2:  [97 %-100 %] 100 %  BP: (123-182)/(66-87) 143/69         Physical Exam  Constitutional:       General: She is not in acute distress.     Appearance: She is obese.   HENT:      Head: Normocephalic.      Mouth/Throat:      Mouth: Mucous membranes are moist.   Eyes:      Pupils: Pupils are equal, round, and reactive to light.   Cardiovascular:      Rate and Rhythm: Normal rate and regular rhythm.   Abdominal:      General: There is no distension.      Tenderness: There is no abdominal tenderness.   Musculoskeletal:      Comments: RLE amputation site with dressings CDI. Minimal ss drainage from staple line.    Neurological:      Mental Status: She is alert.       Significant Labs:  CBC:   Recent Labs   Lab 03/22/22  0459   WBC 8.44   RBC 2.90*   HGB 8.8*   HCT 28.2*       MCV 97   MCH 30.3   MCHC 31.2*     CMP:   Recent Labs   Lab 03/22/22  0459      CALCIUM 8.8      K 4.8   CO2 23      BUN 36*   CREATININE 1.5*     All pertinent labs from the last 24 hours have been reviewed.    Significant Diagnostics:  I have reviewed all pertinent imaging results/findings within the past 24 hours.

## 2022-03-22 NOTE — PLAN OF CARE
Pt is A&O x4. VS remained stable throughout the shift. Pt is on diabetic diet and tolerating well. Pain controlled with PRN meds. AKA incision covered with gauze and ace wrap CDI. ACCU check ACHS, no need to cover. Purwick in place, adequate UOP. Pt resting comfortably, bed in low position, call light within reach, WCTM.    Problem: Adjustment to Illness (Sepsis/Septic Shock)  Goal: Optimal Coping  Outcome: Ongoing, Progressing     Problem: Glycemic Control Impaired (Sepsis/Septic Shock)  Goal: Blood Glucose Level Within Desired Range  Outcome: Ongoing, Progressing     Problem: Adult Inpatient Plan of Care  Goal: Plan of Care Review  Outcome: Ongoing, Progressing     Problem: Adult Inpatient Plan of Care  Goal: Patient-Specific Goal (Individualized)  Outcome: Ongoing, Progressing     Problem: Diabetes Comorbidity  Goal: Blood Glucose Level Within Targeted Range  Outcome: Ongoing, Progressing     Problem: Fall Injury Risk  Goal: Absence of Fall and Fall-Related Injury  Outcome: Ongoing, Progressing

## 2022-03-22 NOTE — ASSESSMENT & PLAN NOTE
56 year old woman with complex medical history.  Now s/p revision RLE BKA to AKA    - Diabetic diet, Boost Glucose Control added   - healing well re-wrapped with kerlix and ace wrap.  -Endocrine following for insulin management.  Appreciate management.  Glucose under much better control  -Hyperkalemia resolved  - PO pain medications  -Home meds reordered, still holding Xarelto  -PT/OT for dispo eval, family and patient wanting to be discharged home if possible as they have completed two stints in rehab  - Stump protector and sock ordered.     Dispo: possible d/c today if receives all equipment from [facility]

## 2022-03-22 NOTE — PROGRESS NOTES
Andrew Andrade - [facility]  Endocrinology  Progress Note    Admit Date: 3/18/2022     Reason for Consult: Management of T2DM, Hyperglycemia      Surgical Procedure and Date: AMPUTATION, ABOVE KNEE (Right)     Diabetes diagnosis year: 2006     Home Diabetes Medications:   Lantus 10 units nightly   Novolog 5 units before meals - (give this 10-15 minutes before you start eating)  Sliding Scale (second box on the log)  If your blood sugar is:  150-199             give extra 1 units of insulin to yourself.  200-249                             2 units  250-299                              3 units  300-349                             4 units  350-399                             5 units  >400                                  6 units        How often checking glucose at home? 1-3 x day   BG readings on regimen: 100's  Hypoglycemia on the regimen?  No  Missed doses on regimen?  No     Diabetes Complications include:     Hyperglycemia, Foot ulcer   and Other skin ulcer     Complicating diabetes co morbidities:   CKD, PVD, Osteomyelitis, HTN, HLD    Hemoglobin A1C   Date Value Ref Range Status   03/18/2022 7.0 (H) 4.0 - 5.6 % Final     Comment:     ADA Screening Guidelines:  5.7-6.4%  Consistent with prediabetes  >or=6.5%  Consistent with diabetes    High levels of fetal hemoglobin interfere with the HbA1C  assay. Heterozygous hemoglobin variants (HbS, HgC, etc)do  not significantly interfere with this assay.   However, presence of multiple variants may affect accuracy.     12/03/2021 9.6 (H) 4.0 - 5.6 % Final     Comment:     ADA Screening Guidelines:  5.7-6.4%  Consistent with prediabetes  >or=6.5%  Consistent with diabetes    High levels of fetal hemoglobin interfere with the HbA1C  assay. Heterozygous hemoglobin variants (HbS, HgC, etc)do  not significantly interfere with this assay.   However, presence of multiple variants may affect accuracy.     06/18/2021 5.9 (H) 4.0 - 5.6 % Final     Comment:     ADA Screening  "Guidelines:  5.7-6.4%  Consistent with prediabetes  >or=6.5%  Consistent with diabetes    High levels of fetal hemoglobin interfere with the HbA1C  assay. Heterozygous hemoglobin variants (HbS, HgC, etc)do  not significantly interfere with this assay.   However, presence of multiple variants may affect accuracy.             HPI: 55 yo F with multiple medical co-morbidities and an extensive past surgical history most significant for prior L AKA and recent R BKA with subsequent revision for poor healing ([place]- Jan 2022) both secondary to PAD. Patient is currently admitted to the hospital medicine service with osteomyelitis of the R BKA stump with break down of the wound/poor healing. ID is following for antibiotic recommendations. Vascular Surgery was consulted for evaluation of the extremity for possible revision to AKA given the ongoing infection and poor healing. Patient would like to have procedure with our vascular team rather than following up in [place] with her previous surgeon.  Endocrine consulted for hyperglycemia and type 2 diabetes.       Interval HPI:   Overnight events:    BG stable while on current SQ insulin regimen. Patient is scheduled for discharged today. POC and discharge recommendations discussed with patient at bedside.   Diet diabetic [facility]; 2000 Calorie; Isolation Tray - Regular [place]  4 Days Post-Op    Eating:   <25% - 100%  Nausea: No  Hypoglycemia and intervention: No  Fever: No  TPN and/or TF: No  If yes, type of TF/TPN and rate: None    BP (!) 140/67 (Patient Position: Lying)   Pulse 80   Temp 97.7 °F (36.5 °C) (Oral)   Resp 15   Ht 5' 5" (1.651 m)   Wt 65.3 kg (144 lb)   LMP 09/30/2015 (Exact Date)   SpO2 100%   Breastfeeding No   BMI 23.96 kg/m²     Labs Reviewed and Include    Recent Labs   Lab 03/22/22  0459      CALCIUM 8.8      K 4.8   CO2 23      BUN 36*   CREATININE 1.5*     Lab Results   Component Value Date    WBC 8.44 03/22/2022    HGB " 8.8 (L) 03/22/2022    HCT 28.2 (L) 03/22/2022    MCV 97 03/22/2022     03/22/2022     No results for input(s): TSH, FREET4 in the last 168 hours.  Lab Results   Component Value Date    HGBA1C 7.0 (H) 03/18/2022       Nutritional status:   Body mass index is 23.96 kg/m².  Lab Results   Component Value Date    ALBUMIN 2.2 (L) 03/16/2022    ALBUMIN 2.2 (L) 03/15/2022    ALBUMIN 2.0 (L) 03/14/2022     Lab Results   Component Value Date    PREALBUMIN 10 (L) 02/03/2022    PREALBUMIN 8 (L) 06/07/2021    PREALBUMIN 6 (L) 05/31/2021       Estimated Creatinine Clearance: 38.1 mL/min (A) (based on SCr of 1.5 mg/dL (H)).    Accu-Checks  Recent Labs     03/20/22  1547 03/20/22  1648 03/20/22  2111 03/20/22  2251 03/21/22  0733 03/21/22  1232 03/21/22  1630 03/21/22  2140 03/22/22  0734 03/22/22  1156   POCTGLUCOSE 153* 133* 122* 154* 136* 166* 214* 173* 134* 158*       Current Medications and/or Treatments Impacting Glycemic Control  Immunotherapy:    Immunosuppressants       None          Steroids:   Hormones (From admission, onward)                Start     Stop Route Frequency Ordered    03/18/22 1741  melatonin tablet 6 mg         -- Oral Nightly PRN 03/18/22 1647          Pressors:    Autonomic Drugs (From admission, onward)                None          Hyperglycemia/Diabetes Medications:   Antihyperglycemics (From admission, onward)                Start     Stop Route Frequency Ordered    03/21/22 2100  insulin detemir U-100 pen 6 Units         -- SubQ Nightly 03/21/22 1816    03/20/22 1130  insulin aspart U-100 pen 3 Units         -- SubQ 3 times daily with meals 03/20/22 0939    03/19/22 1018  insulin aspart U-100 pen 0-5 Units         -- SubQ Before meals & nightly PRN 03/19/22 0918            ASSESSMENT and PLAN    * Infection of below knee amputation stump  Infection may elevate BG readings  On IV antibiotics  Managed per primary team  Avoid hypoglycemia        Type 2 diabetes mellitus with hyperglycemia, with  "long-term current use of insulin  Endocrinology consulted for BG management.   BG goal 140-180      - Novolog (aspart) insulin 3 Units SQ TIDWM and prn for BG excursions /50 SSI   - BG checks AC/HS      ** Please notify Endocrine for any change and/or advance in diet**  ** Please call Endocrine for any BG related issues **    Discharge Planning:   - PRN refill of Insurance preferred diabetes testing supplies.  - PRN refill of Ultra-Fine Junie Pen Needles 4mm x 32 G (5/32" x 0.23mm).   - Reduce Home Lantus to 6 units HS  - continue Novolog 5 units TIDWM with correction.   - Patient is to follow-up with Dr. Jameson with [facility] end[facility] outpatient clinic.             Surgical wound present  Optimize BG control to improve wound healing              Cody Jameson, NP  Endocrinology  [facility]  "

## 2022-03-22 NOTE — PLAN OF CARE
Patient d/c home, d/c instructions given to the patient and her , both verbalized understanding. Education given, refer to clinical reference for education. IVs removed, tip intact. Telemetry d/c. Home meds delivered to bedside. Pt  will transport her to car with personal wheelchair.     Problem: Glycemic Control Impaired (Sepsis/Septic Shock)  Goal: Blood Glucose Level Within Desired Range  Outcome: Ongoing, Progressing     Problem: Infection  Goal: Absence of Infection Signs and Symptoms  Outcome: Met     Problem: Bleeding (Sepsis/Septic Shock)  Goal: Absence of Bleeding  Outcome: Met

## 2022-03-22 NOTE — PLAN OF CARE
CRYSTAL CONTACTED [facility] RE THE STUMP PROTECTOR AND SOCK [facility] IS WAITING FOR THE ORDER TO ARRIVE   CRYSTAL MET WITH THE PT AND HER FAMILY TO GIVE THE ABOVE INFORMATION AND THE THERE ARE 2 SOCKS IN THE WITH THE STUMP PROTECTOR CRYSTAL EXPLAINED THAT THE PT CAN NOT BE DISCHARGED WITHOUT THE ABOVE EQUIPMENT  CM WILL CONTINUE TO FOLLOW

## 2022-03-22 NOTE — DISCHARGE SUMMARY
[facility]  Vascular Surgery  Discharge Summary      Patient Name: Suyapa Connelly  MRN: [id number]  Admission Date: 3/18/2022  Hospital Length of Stay: 4 days  Discharge Date and Time:  03/22/2022 1:07 PM  Attending Physician: DAYNE Florez II, MD   Discharging Provider: Heriberto Mcgee MD  Primary Care Provider: Magen Christensen MD    HPI:   yo F with multiple medical co-morbidities and an extensive past surgical history most significant for prior L AKA and recent R BKA with subsequent revision for poor healing ([place]- Jan 2022) both secondary to PAD. Patient is currently admitted to the hospital medicine service with osteomyelitis of the R BKA stump with break down of the wound/poor healing. ID is following for antibiotic recommendations. Vascular Surgery was consulted for evaluation of the extremity for possible revision to AKA given the ongoing infection and poor healing. Patient would like to have procedure with our vascular team rather than following up in [place] with her previous surgeon.        Procedure(s) (LRB):  AMPUTATION, ABOVE KNEE (Right)     Hospital Course: 3/18: Revision RLE BKA to AKA      Goals of Care Treatment Preferences:  Code Status: Full Code      Consults:   Consults (From admission, onward)        Status Ordering Provider     Inpatient consult to Social Work  Once        Provider:  (Not yet assigned)    Acknowledged HERIBERTO MCGEE     Inpatient consult to Psychology  Once        Provider:  (Not yet assigned)    Completed HERIBERTO MCGEE     Inpatient consult to Endocrinology  Once        Provider:  (Not yet assigned)    CORA Riojas              Pending Diagnostic Studies:     Procedure Component Value Units Date/Time    Basic metabolic panel [[id number]] Collected: 03/19/22 0822    Order Status: Sent Lab Status: In process Updated: 03/19/22 0823    Specimen: Blood     Basic metabolic panel [[id number]] Collected: 03/19/22 0822    Order Status: Sent Lab Status: In  process Updated: 03/19/22 0823    Specimen: Blood     Specimen to Pathology, Surgery Other (Vascular Surgery) [[id number]] Collected: 03/18/22 1529    Order Status: Sent Lab Status: In process Updated: 03/18/22 1609        Final Active Diagnoses:    Diagnosis Date Noted POA    PRINCIPAL PROBLEM:  Infection of below knee amputation stump [T87.40] 03/12/2022 Yes    Uses self-applied continuous glucose monitoring device [Z97.8] 03/20/2022 Unknown    Surgical wound present [T14.8XXA] 03/19/2022 Unknown    Type 2 diabetes mellitus with hyperglycemia, with long-term current use of insulin [E11.65, Z79.4] 01/02/2020 Not Applicable      Problems Resolved During this Admission:      Discharged Condition: good    Disposition: Home-Health Care [facility]    Follow Up:   Follow-up Information     Magen Christensen MD Follow up on 3/28/2022.    Specialty: Family Medicine  Why: F/U appointment 8:00 am  With the following    Rush Bueno MD  Monday Mar 28, 2022 8:00 AM  Please arrive approximately 15 minutes before your scheduled appointment time and ensure that you have a valid government issued ID and your insurance card. ePre-Check is available and completion prior to your arrival will assist with a quicker registration process.  Contact information:  [address] [place] [address] [phone]                         Patient Instructions:      Ambulatory referral/consult to Home Health   Standing Status: Future   Referral Priority: Routine Referral Type: Home Health Care   Referral Reason: Specialty Services Required   Requested Specialty: Home Health Services   Number of Visits Requested: 1     Medications:  Reconciled Home Medications:      Medication List      START taking these medications    amitriptyline 10 MG tablet  Commonly known as: ELAVIL  Take 1 tablet (10 mg total) by mouth nightly as needed for Pain.     oxyCODONE 5 MG immediate release tablet  Commonly known as: ROXICODONE  Take 1 tablet (5 mg total)  by mouth every 6 (six) hours as needed for Pain.     pregabalin 75 MG capsule  Commonly known as: LYRICA  Take 1 capsule (75 mg total) by mouth 3 (three) times daily.        CONTINUE taking these medications    acetaminophen 500 MG tablet  Commonly known as: TYLENOL  Take 2 tablets (1,000 mg total) by mouth every 8 (eight) hours as needed for Pain.     allopurinoL 100 MG tablet  Commonly known as: ZYLOPRIM  Take 1 tablet (100 mg total) by mouth once daily.     atorvastatin 80 MG tablet  Commonly known as: LIPITOR  Take 1 tablet (80 mg total) by mouth every evening.     carvediloL 3.125 MG tablet  Commonly known as: COREG  Take 1 tablet (3.125 mg total) by mouth 2 (two) times daily.     clopidogreL 75 mg tablet  Commonly known as: PLAVIX  Take 1 tablet (75 mg total) by mouth once daily.     DEXCOM G6  Misc  Generic drug: blood-glucose meter,continuous  Use to check blood sugars 4 times/day     DEXCOM G6 SENSOR Radha  Generic drug: blood-glucose sensor  Apply one sensor to skin every 10 days     DEXCOM G6 TRANSMITTER Radha  Generic drug: blood-glucose transmitter  Replace transmitter every 3 months to use with dexcom sensor     furosemide 40 MG tablet  Commonly known as: LASIX  Take 1 tablet (40 mg total) by mouth once daily. Please hold until follow up with PCP     magnesium oxide 400 mg (241.3 mg magnesium) tablet  Commonly known as: MAG-OX  Take 1 tablet (400 mg total) by mouth 2 (two) times daily.     melatonin 3 mg tablet  Commonly known as: MELATIN  Take 2 tablets (6 mg total) by mouth nightly as needed for Insomnia.     mirtazapine 7.5 MG Tab  Commonly known as: REMERON  Take 2 tablets (15 mg total) by mouth nightly.     NIFEdipine 30 MG (OSM) 24 hr tablet  Commonly known as: PROCARDIA-XL  Take 1 tablet (30 mg total) by mouth once daily.     ondansetron 8 MG Tbdl  Commonly known as: ZOFRAN-ODT  Dissolve 1 tablet (8 mg total) by mouth every 8 (eight) hours as needed.     ONE-A-DAY WOMEN'S 50 PLUS  ORAL  Take 1 tablet by mouth Daily.     sertraline 50 MG tablet  Commonly known as: ZOLOFT  Take 1 tablet (50 mg total) by mouth once daily.     sodium bicarbonate 650 MG tablet  Take 1 tablet (650 mg total) by mouth 2 (two) times daily. Please hold until follow up with Primary Care Provider        ASK your doctor about these medications    HYDROcodone-acetaminophen  mg per tablet  Commonly known as: NORCO  Take 1 tablet by mouth every 6 (six) hours as needed for Pain.  Ask about: Should I take this medication?     insulin aspart U-100 100 unit/mL (3 mL) Inpn pen  Commonly known as: NovoLOG Flexpen U-100 Insulin  Inject 5 Units into the skin 3 (three) times daily with meals. If not eating, ok to hold     insulin detemir U-100 100 unit/mL (3 mL) Inpn pen  Commonly known as: Levemir FLEXTOUCH  Inject 12 Units into the skin every evening.     XARELTO 2.5 mg Tab  Generic drug: rivaroxaban  Take 1 tablet (2.5 mg total) by mouth 2 (two) times daily with meals.            Stan Leary MD  Vascular Surgery  [facility]

## 2022-03-22 NOTE — PLAN OF CARE
03/22/22 1423   Post-Acute Status   Post-Acute Authorization Home Health   Home Health Status Referrals Sent    sent HH orders to [facility].    Janessa Montoya LMSW  [facility]  [id number]

## 2022-03-22 NOTE — PROGRESS NOTES
Health Maintenance Due   Topic Date Due    Cervical Cancer Screening  Never done    Pneumococcal Vaccines (Age 0-64) (1 of 4 - PCV13) Never done    Eye Exam  Never done    TETANUS VACCINE  Never done    Sign Pain Contract  Never done    Complete Opioid Risk Tool  Never done    Mammogram  Never done    Colorectal Cancer Screening  Never done    Diabetes Urine Screening  01/05/2021    COVID-19 Vaccine (3 - Booster for Pfizer series) 08/30/2021     Updates were requested from care everywhere.  Chart was reviewed for overdue Proactive [facility] Encounters (MANDY) topics (CRS, Breast Cancer Screening, Eye exam)  Health Maintenance has been updated.  LINKS immunization registry triggered.  Immunizations were reconciled.

## 2022-03-22 NOTE — HPI
yo F with multiple medical co-morbidities and an extensive past surgical history most significant for prior L AKA and recent R BKA with subsequent revision for poor healing ([place]- Jan 2022) both secondary to PAD. Patient is currently admitted to the hospital medicine service with osteomyelitis of the R BKA stump with break down of the wound/poor healing. ID is following for antibiotic recommendations. Vascular Surgery was consulted for evaluation of the extremity for possible revision to AKA given the ongoing infection and poor healing. Patient would like to have procedure with our vascular team rather than following up in [place] with her previous surgeon.

## 2022-03-22 NOTE — SUBJECTIVE & OBJECTIVE
"Interval HPI:   Overnight events:    BG stable while on current SQ insulin regimen. Patient is scheduled for discharged today. POC and discharge recommendations discussed with patient at bedside.   Diet diabetic [facility]; 2000 Calorie; Isolation Tray - Regular [place]  4 Days Post-Op    Eating:   <25% - 100%  Nausea: No  Hypoglycemia and intervention: No  Fever: No  TPN and/or TF: No  If yes, type of TF/TPN and rate: None    BP (!) 140/67 (Patient Position: Lying)   Pulse 80   Temp 97.7 °F (36.5 °C) (Oral)   Resp 15   Ht 5' 5" (1.651 m)   Wt 65.3 kg (144 lb)   LMP 09/30/2015 (Exact Date)   SpO2 100%   Breastfeeding No   BMI 23.96 kg/m²     Labs Reviewed and Include    Recent Labs   Lab 03/22/22  0459      CALCIUM 8.8      K 4.8   CO2 23      BUN 36*   CREATININE 1.5*     Lab Results   Component Value Date    WBC 8.44 03/22/2022    HGB 8.8 (L) 03/22/2022    HCT 28.2 (L) 03/22/2022    MCV 97 03/22/2022     03/22/2022     No results for input(s): TSH, FREET4 in the last 168 hours.  Lab Results   Component Value Date    HGBA1C 7.0 (H) 03/18/2022       Nutritional status:   Body mass index is 23.96 kg/m².  Lab Results   Component Value Date    ALBUMIN 2.2 (L) 03/16/2022    ALBUMIN 2.2 (L) 03/15/2022    ALBUMIN 2.0 (L) 03/14/2022     Lab Results   Component Value Date    PREALBUMIN 10 (L) 02/03/2022    PREALBUMIN 8 (L) 06/07/2021    PREALBUMIN 6 (L) 05/31/2021       Estimated Creatinine Clearance: 38.1 mL/min (A) (based on SCr of 1.5 mg/dL (H)).    Accu-Checks  Recent Labs     03/20/22  1547 03/20/22  1648 03/20/22  2111 03/20/22  2251 03/21/22  0733 03/21/22  1232 03/21/22  1630 03/21/22  2140 03/22/22  0734 03/22/22  1156   POCTGLUCOSE 153* 133* 122* 154* 136* 166* 214* 173* 134* 158*       Current Medications and/or Treatments Impacting Glycemic Control  Immunotherapy:    Immunosuppressants       None          Steroids:   Hormones (From admission, onward)                Start    "  Stop Route Frequency Ordered    03/18/22 1741  melatonin tablet 6 mg         -- Oral Nightly PRN 03/18/22 1647          Pressors:    Autonomic Drugs (From admission, onward)                None          Hyperglycemia/Diabetes Medications:   Antihyperglycemics (From admission, onward)                Start     Stop Route Frequency Ordered    03/21/22 2100  insulin detemir U-100 pen 6 Units         -- SubQ Nightly 03/21/22 1816    03/20/22 1130  insulin aspart U-100 pen 3 Units         -- SubQ 3 times daily with meals 03/20/22 0939    03/19/22 1018  insulin aspart U-100 pen 0-5 Units         -- SubQ Before meals & nightly PRN 03/19/22 0918

## 2022-03-22 NOTE — PLAN OF CARE
CM MET WITH THE PT AND HER  THEY LIVE IN A 1 STORY HOUSE SHE IS NOT ON DIALYSIS AND DOES NOT TAKE COUMADIN SHE HAS TRANSPORTATION HOME  PHARMACY UNKNOWN AT THIS TIME   PCP CARMELA CHRISTENSEN LAST APPT 02/22  POA/SPOUSE SUHAS   [facility]  Initial Discharge Assessment       Primary Care Provider: Magen Christensen MD    Admission Diagnosis: S/P BKA (below knee amputation) unilateral, right [Z89.511]  PVD (peripheral vascular disease) [I73.9]    Admission Date: 3/18/2022  Expected Discharge Date: 3/24/2022    Discharge Barriers Identified: None    Payor: [employer] MEDICARE / Plan: [employer] MEDICARE HMO / Product Type: Capitation /     Extended Emergency Contact Information  Primary Emergency Contact: Suhas Connelly  Address: [address] [place], [place] [id number] [facility]  Home Phone: [phone]  Relation: Spouse  Secondary Emergency Contact: Cornelia Connelly  Address: [address] [place] [address]  Home Phone: [phone]  Relation: Daughter  Mother: Tea Sal   [place]  Mobile Phone: [phone]    Discharge Plan A: Home with family, Home  Discharge Plan B: Home Health, Home with family, Home      [id number] - [place], [place] - [facility] [place] [address] AT [facility] [address] [place] [address]  [facility] [place] [address]  Phone: [phone] Fax: [phone]      Initial Assessment (most recent)     Adult Discharge Assessment - 03/22/22 1256        Discharge Assessment    Confirmed/corrected address, phone number and insurance Yes     Confirmed Demographics Correct on Facesheet     Source of Information patient;family     When was your last doctors appointment? 02/15/22     Communicated DEVAN with patient/caregiver Date not available/Unable to determine     Lives With spouse     Do you expect to return to your current living situation? Yes     Do you have help at home or someone to  help you manage your care at home? Yes     Prior to hospitilization cognitive status: Alert/Oriented     Current cognitive status: Alert/Oriented     Walking or Climbing Stairs Difficulty ambulation difficulty, assistance 1 person     Mobility Management WHEELCHAIR     Dressing/Bathing Difficulty bathing difficulty, assistance 1 person     Home Layout Able to live on 1st floor     Equipment Currently Used at Home wheelchair     Readmission within 30 days? Yes     Patient currently being followed by outpatient case management? No     Do you currently have service(s) that help you manage your care at home? No     Do you take prescription medications? Yes     Do you have prescription coverage? Yes     Do you have any problems affording any of your prescribed medications? No     Is the patient taking medications as prescribed? yes     Who is going to help you get home at discharge? SPOUSE SUHAS      How do you get to doctors appointments? family or friend will provide     Are you on dialysis? No     Do you take coumadin? No   XARELTO    Discharge Plan A Home with family;Home     Discharge Plan B Home Health;Home with family;Home     DME Needed Upon Discharge  none     Discharge Plan discussed with: Spouse/sig other;Patient     Discharge Barriers Identified None

## 2022-03-22 NOTE — PT/OT/SLP PROGRESS
"Physical Therapy Treatment    Patient Name:  Suyapa Connelly   MRN:  [id number]  Admitting Diagnosis: Infection of below knee amputation stump  Recent Surgery: Procedure(s) (LRB):  AMPUTATION, ABOVE KNEE (Right) 4 Days Post-Op    Recommendations:     Discharge Recommendations:  home   Discharge Equipment Recommendations: none   Barriers to discharge: Decreased caregiver support    Plan:     During this hospitalization, patient to be seen 3 x/week to address the above listed problems via therapeutic activities  · Plan of Care Expires:  04/18/22   Plan of Care Reviewed with: patient, spouse    This Plan of care has been discussed with the patient who was involved in its development and understands and is in agreement with the identified goals and treatment plan    Subjective     Communicated with nurse (Anais) prior to session.     Patient comments: "Yeah, I do pretty well getting into the w/c"  Pain/Comfort:  · Pain Rating 1: 0/10  · Pain Rating Post-Intervention 1: 0/10    Objective:     Patient found with: telemetry  purewick  Patient found sup in bed upon PT entry to room, agreeable to treatment.   present in the room.    RESPIRATORY STATUS:   room air    General Precautions: Standard, fall   Orthopedic Precautions:RLE non weight bearing   Braces: N/A       BED MOBILITY (vc's for hand placement sequencing of task):        Rolling to the R:  Mod I with bedrail.       Rolling to the L:  Mod I with bedrail.        Sup > sit at the EOB:  Mod I with bedrail.       Sit > sup:  Not performed 2* pt left seated up in the chair.        Pt dons mesh underwear while in bed prior to getting OOB to w/c                SITTING AT THE EDGE OF THE BED    Assistance Level Required: SBA/close sup for safety with B UE support   Postural deviations noted: none     TRANSFERS  (vc's for hand placement, sequencing of task and safety)   Patient completed Bed <> pt's personal w/c Transfer using scoot transfer,approach backward  " technique with SBA with no assistive device   Patient completed Chair <> bed not performed 2* pt was left seated up in the chair     EDUCATION  Patient provided with daily orientation and goals of this PT session. They were educated to call for assistance and to transfer with hospital staff only.  Also, pt was educated on the effects of prolonged immobility and the importance of performing OOB activity and exercises to promote healing and reduce recovery time      Whiteboard updated with correct mobility information. RN/PCT notified.  Pt safe to transfer OOB/BTB with RN/PCT and : Use no AD with scoot transfer.    Patient left up in chair, with  call button in reach, nurse notified and  present    AM-PAC 6 CLICK MOBILITY  Turning over in bed (including adjusting bedclothes, sheets and blankets)?: 4  Sitting down on and standing up from a chair with arms (e.g., wheelchair, bedside commode, etc.): 1  Moving from lying on back to sitting on the side of the bed?: 4  Moving to and from a bed to a chair (including a wheelchair)?: 3  Need to walk in hospital room?: 1  Climbing 3-5 steps with a railing?: 1  Basic Mobility Total Score: 14     Assessment:     Suyapa Connelly is a 56 y.o. female admitted with a medical diagnosis of Infection of below knee amputation stump.  She presents with the following impairments/functional limitations:  impaired functional mobilty. requiring light assistance and verbal cues for transfer to prevent falls due to weakness.   Pt remains motivated to participate in PT session and will cont to benefit from skilled PT intervention.    Rehab Prognosis:  Good; patient would benefit from acute skilled PT services to address these deficits and reach maximum level of function.      GOALS:   Multidisciplinary Problems     Physical Therapy Goals        Problem: Physical Therapy Goal    Goal Priority Disciplines Outcome Goal Variances Interventions   Physical Therapy Goal     PT, PT/OT  Ongoing, Progressing     Description: Goals to be met by: 3/30/22    Patient will increase functional independence with mobility by performin. Bed to chair transfer with Stand-by Assistance using Slideboard if needed.                      Time Tracking:     PT Received On: 22  PT Start Time: 1120     PT Stop Time: 1150  PT Total Time (min): 30 min     Billable Minutes: Therapeutic Activity 30    Treatment Type: Treatment  PT/PTA: PTA     PTA Visit Number: 1       LONI Wilcox.  Pager [phone]    3/22/2022    .

## 2022-03-22 NOTE — ASSESSMENT & PLAN NOTE
"Endocrinology consulted for BG management.   BG goal 140-180      - Novolog (aspart) insulin 3 Units SQ TIDWM and prn for BG excursions /50 SSI   - BG checks AC/HS      ** Please notify Endocrine for any change and/or advance in diet**  ** Please call Endocrine for any BG related issues **    Discharge Planning:   - PRN refill of Insurance preferred diabetes testing supplies.  - PRN refill of Ultra-Fine Junie Pen Needles 4mm x 32 G (5/32" x 0.23mm).   - Reduce Home Lantus to 6 units HS  - continue Novolog 5 units TIDWM with correction.   - Patient is to follow-up with Dr. Jameson with [facility] [place].           "

## 2022-03-22 NOTE — PROGRESS NOTES
[facility] - [facility]  Vascular Surgery  Progress Note    Patient Name: Suyapa Connelly  MRN: [id number]  Admission Date: 3/18/2022  Primary Care Provider: Magen Christensen MD    Subjective:     Interval History: NAEO. Tolerated transition to PO medications. Measured yesterday for stump protector and sock.     Post-Op Info:  Procedure(s) (LRB):  AMPUTATION, ABOVE KNEE (Right)   4 Days Post-Op       Medications:  Continuous Infusions:  Scheduled Meds:   acetaminophen  1,000 mg Oral Q8H    allopurinoL  100 mg Oral Daily    atorvastatin  80 mg Oral QHS    carvediloL  3.125 mg Oral BID    clopidogreL  75 mg Oral Daily    furosemide  40 mg Oral Daily    heparin (porcine)  5,000 Units Subcutaneous Q8H    insulin aspart U-100  3 Units Subcutaneous TIDWM    insulin detemir U-100  6 Units Subcutaneous QHS    magnesium oxide  400 mg Oral BID    mirtazapine  15 mg Oral Nightly    NIFEdipine  30 mg Oral Daily    pregabalin  75 mg Oral TID    sertraline  50 mg Oral Daily    sodium bicarbonate  650 mg Oral BID     PRN Meds:sodium chloride, amitriptyline, dextrose 10%, dextrose 10%, glucagon (human recombinant), glucose, glucose, insulin aspart U-100, melatonin, ondansetron, oxyCODONE, oxyCODONE, sodium chloride 0.9%     Objective:     Vital Signs (Most Recent):  Temp: 98.1 °F (36.7 °C) (03/22/22 0422)  Pulse: 82 (03/22/22 0422)  Resp: 16 (03/22/22 0500)  BP: (!) 143/69 (03/22/22 0422)  SpO2: 100 % (03/22/22 0422)   Vital Signs (24h Range):  Temp:  [98.1 °F (36.7 °C)-98.8 °F (37.1 °C)] 98.1 °F (36.7 °C)  Pulse:  [] 82  Resp:  [15-24] 16  SpO2:  [97 %-100 %] 100 %  BP: (123-182)/(66-87) 143/69         Physical Exam  Constitutional:       General: She is not in acute distress.     Appearance: She is obese.   HENT:      Head: Normocephalic.      Mouth/Throat:      Mouth: Mucous membranes are moist.   Eyes:      Pupils: Pupils are equal, round, and reactive to light.   Cardiovascular:      Rate and Rhythm: Normal rate  and regular rhythm.   Abdominal:      General: There is no distension.      Tenderness: There is no abdominal tenderness.   Musculoskeletal:      Comments: RLE amputation site with dressings CDI. Minimal ss drainage from staple line.    Neurological:      Mental Status: She is alert.       Significant Labs:  CBC:   Recent Labs   Lab 03/22/22  0459   WBC 8.44   RBC 2.90*   HGB 8.8*   HCT 28.2*      MCV 97   MCH 30.3   MCHC 31.2*     CMP:   Recent Labs   Lab 03/22/22 0459      CALCIUM 8.8      K 4.8   CO2 23      BUN 36*   CREATININE 1.5*     All pertinent labs from the last 24 hours have been reviewed.    Significant Diagnostics:  I have reviewed all pertinent imaging results/findings within the past 24 hours.    Assessment/Plan:     Infection of below knee amputation stump  56 year old woman with complex medical history.  Now s/p revision RLE BKA to AKA    - Diabetic diet, Boost Glucose Control added   - healing well re-wrapped with kerlix and ace wrap.  -Endocrine following for insulin management.  Appreciate management.  Glucose under much better control  -Hyperkalemia resolved  - PO pain medications  -Home meds reordered, still holding Xarelto  -PT/OT for dispo eval, family and patient wanting to be discharged home if possible as they have completed two stints in rehab  - Stump protector and sock ordered.     Dispo: possible d/c today if receives all equipment from [facility]             Catrachito Macias MD  Vascular Surgery  [facility]

## 2022-03-23 ENCOUNTER — PATIENT OUTREACH (OUTPATIENT)
Dept: ADMINISTRATIVE | Facility: CLINIC | Age: 57
End: 2022-03-23
Payer: MEDICARE

## 2022-03-23 PROCEDURE — G0180 MD CERTIFICATION HHA PATIENT: HCPCS | Mod: ,,, | Performed by: SURGERY

## 2022-03-23 PROCEDURE — G0180 PR HOME HEALTH MD CERTIFICATION: ICD-10-PCS | Mod: ,,, | Performed by: SURGERY

## 2022-03-24 ENCOUNTER — PATIENT OUTREACH (OUTPATIENT)
Dept: ADMINISTRATIVE | Facility: CLINIC | Age: 57
End: 2022-03-24
Payer: MEDICARE

## 2022-03-24 NOTE — PROGRESS NOTES
Received a Day 2 escalation from patient. Contacted patient via telephone and she stated she pressed in error no needs at this time. Understanding verbalized.

## 2022-03-25 ENCOUNTER — TELEPHONE (OUTPATIENT)
Dept: INTERNAL MEDICINE | Facility: CLINIC | Age: 57
End: 2022-03-25
Payer: MEDICARE

## 2022-03-25 RX ORDER — FUROSEMIDE 40 MG/1
40 TABLET ORAL DAILY
Start: 2022-03-25

## 2022-03-25 NOTE — TELEPHONE ENCOUNTER
Pt  states pt has been on laxis since hospital visit. He also said that pt isnt taking in many fluids and is not using the bathroom frequently. Pt  wants to know if med can be refilled since she out of them and they were helping pt.

## 2022-03-25 NOTE — TELEPHONE ENCOUNTER
Patients HH nurse Lloyd called from [facility] in regards to patient visit today.After evaluation today patient with noted pitting edema to right AKA stump. Patient also noted with bilateral lower lobe crackles upon auscultation. I see that patient has a follow up appointment with Dr Bueno on Monday.Did let HH nurse that patient has an upcoming appt as well as she needs to be seen for refill of lasix medication. I believe the  called before the HH nurse evaluated patient.Spoke to  Randell. Patient is not urinating a lot but is also not drinking a lot. Patient has a long history with chronic kidney disease and renal failure.PCP aware.Please advise.

## 2022-03-25 NOTE — TELEPHONE ENCOUNTER
No new care gaps identified.  Powered by [employer] by [employer]. Reference number: [id number].   3/25/2022 11:26:14 AM CDT

## 2022-03-25 NOTE — TELEPHONE ENCOUNTER
Has appt w/ Dr. Bueno for discharge follow up on Monday. Needs appointment before further fills of this med.

## 2022-03-25 NOTE — TELEPHONE ENCOUNTER
----- Message from Sandra Conteh sent at 3/25/2022 11:01 AM CDT -----  Contact: [phone]  Pts  is calling he states the pt had her leg amutated and he states she is filling up with fluid and he states she needs the medication please advise and give return call please give return call so they know this has been done he states she is barely even urinating    Requesting an RX refill or new RX.  Is this a refill or new RX: refill  RX name and strength (copy/paste from chart):  furosemide (LASIX) 40 MG tablet  Is this a 30 day or 90 day RX: 30  Pharmacy name and phone # (copy/paste from chart):   [id number] - [place]  [facility]  [address]  [place] [place] [address]  Phone: [phone] Fax: [phone]      The doctors have asked that we provide their patients with the following 2 reminders -- prescription refills can take up to 72 hours, and a friendly reminder that in the future you can use your [facility] account to request refills:yes

## 2022-03-25 NOTE — TELEPHONE ENCOUNTER
Called and spoke to patient and . Informed them both that they need to be seen at the ED/ER for an evaluation related to crackles in the lower lobes. Patient verbalized understanding with read back.

## 2022-03-26 ENCOUNTER — HOSPITAL ENCOUNTER (OUTPATIENT)
Facility: HOSPITAL | Age: 57
Discharge: HOME | End: 2022-03-27
Attending: EMERGENCY MEDICINE | Admitting: INTERNAL MEDICINE
Payer: MEDICARE

## 2022-03-26 DIAGNOSIS — M79.89 RIGHT LEG SWELLING: ICD-10-CM

## 2022-03-26 DIAGNOSIS — N17.9 AKI (ACUTE KIDNEY INJURY): Primary | ICD-10-CM

## 2022-03-26 DIAGNOSIS — R07.9 CHEST PAIN: ICD-10-CM

## 2022-03-26 DIAGNOSIS — R60.0 BILATERAL LOWER EXTREMITY EDEMA: ICD-10-CM

## 2022-03-26 DIAGNOSIS — M79.89 LEG SWELLING: ICD-10-CM

## 2022-03-26 PROBLEM — E87.5 HYPERKALEMIA: Status: ACTIVE | Noted: 2022-03-26

## 2022-03-26 PROBLEM — Z89.611 S/P AKA (ABOVE KNEE AMPUTATION), RIGHT: Status: ACTIVE | Noted: 2022-03-26

## 2022-03-26 PROBLEM — Z89.612: Status: ACTIVE | Noted: 2022-03-26

## 2022-03-26 LAB
ALBUMIN SERPL BCP-MCNC: 2 G/DL (ref 3.5–5.2)
ALP SERPL-CCNC: 162 U/L (ref 55–135)
ALT SERPL W/O P-5'-P-CCNC: 14 U/L (ref 10–44)
ANION GAP SERPL CALC-SCNC: 11 MMOL/L (ref 8–16)
AST SERPL-CCNC: 22 U/L (ref 10–40)
BACTERIA #/AREA URNS AUTO: ABNORMAL /HPF
BASOPHILS # BLD AUTO: 0.04 K/UL (ref 0–0.2)
BASOPHILS NFR BLD: 0.5 % (ref 0–1.9)
BILIRUB SERPL-MCNC: 0.3 MG/DL (ref 0.1–1)
BILIRUB UR QL STRIP: NEGATIVE
BNP SERPL-MCNC: 222 PG/ML (ref 0–99)
BUN SERPL-MCNC: 73 MG/DL (ref 6–20)
CALCIUM SERPL-MCNC: 9.5 MG/DL (ref 8.7–10.5)
CHLORIDE SERPL-SCNC: 107 MMOL/L (ref 95–110)
CLARITY UR REFRACT.AUTO: ABNORMAL
CO2 SERPL-SCNC: 17 MMOL/L (ref 23–29)
COLOR UR AUTO: YELLOW
CREAT SERPL-MCNC: 2.5 MG/DL (ref 0.5–1.4)
DIFFERENTIAL METHOD: ABNORMAL
EOSINOPHIL # BLD AUTO: 0.3 K/UL (ref 0–0.5)
EOSINOPHIL NFR BLD: 3.3 % (ref 0–8)
ERYTHROCYTE [DISTWIDTH] IN BLOOD BY AUTOMATED COUNT: 15.2 % (ref 11.5–14.5)
EST. GFR  (AFRICAN AMERICAN): 24.2 ML/MIN/1.73 M^2
EST. GFR  (NON AFRICAN AMERICAN): 21 ML/MIN/1.73 M^2
ESTIMATED AVG GLUCOSE: 151 MG/DL (ref 68–131)
GLUCOSE SERPL-MCNC: 168 MG/DL (ref 70–110)
GLUCOSE UR QL STRIP: NEGATIVE
HBA1C MFR BLD: 6.9 % (ref 4–5.6)
HCT VFR BLD AUTO: 27.3 % (ref 37–48.5)
HGB BLD-MCNC: 8.1 G/DL (ref 12–16)
HGB UR QL STRIP: ABNORMAL
HYALINE CASTS UR QL AUTO: 0 /LPF
IMM GRANULOCYTES # BLD AUTO: 0.02 K/UL (ref 0–0.04)
IMM GRANULOCYTES NFR BLD AUTO: 0.2 % (ref 0–0.5)
KETONES UR QL STRIP: NEGATIVE
LACTATE SERPL-SCNC: 0.6 MMOL/L (ref 0.5–2.2)
LEUKOCYTE ESTERASE UR QL STRIP: ABNORMAL
LYMPHOCYTES # BLD AUTO: 1.2 K/UL (ref 1–4.8)
LYMPHOCYTES NFR BLD: 14.2 % (ref 18–48)
MAGNESIUM SERPL-MCNC: 2.7 MG/DL (ref 1.6–2.6)
MCH RBC QN AUTO: 30.3 PG (ref 27–31)
MCHC RBC AUTO-ENTMCNC: 29.7 G/DL (ref 32–36)
MCV RBC AUTO: 102 FL (ref 82–98)
MICROSCOPIC COMMENT: ABNORMAL
MONOCYTES # BLD AUTO: 1.2 K/UL (ref 0.3–1)
MONOCYTES NFR BLD: 14.5 % (ref 4–15)
NEUTROPHILS # BLD AUTO: 5.5 K/UL (ref 1.8–7.7)
NEUTROPHILS NFR BLD: 67.3 % (ref 38–73)
NITRITE UR QL STRIP: NEGATIVE
NRBC BLD-RTO: 0 /100 WBC
PH UR STRIP: 6 [PH] (ref 5–8)
PLATELET # BLD AUTO: 408 K/UL (ref 150–450)
PMV BLD AUTO: 10.4 FL (ref 9.2–12.9)
POCT GLUCOSE: 133 MG/DL (ref 70–110)
POCT GLUCOSE: 171 MG/DL (ref 70–110)
POCT GLUCOSE: 180 MG/DL (ref 70–110)
POCT GLUCOSE: 256 MG/DL (ref 70–110)
POTASSIUM SERPL-SCNC: 5.8 MMOL/L (ref 3.5–5.1)
POTASSIUM SERPL-SCNC: 5.8 MMOL/L (ref 3.5–5.1)
PROT SERPL-MCNC: 7.3 G/DL (ref 6–8.4)
PROT UR QL STRIP: ABNORMAL
RBC # BLD AUTO: 2.67 M/UL (ref 4–5.4)
RBC #/AREA URNS AUTO: 45 /HPF (ref 0–4)
SODIUM SERPL-SCNC: 135 MMOL/L (ref 136–145)
SP GR UR STRIP: 1.01 (ref 1–1.03)
SQUAMOUS #/AREA URNS AUTO: 1 /HPF
URN SPEC COLLECT METH UR: ABNORMAL
WBC # BLD AUTO: 8.15 K/UL (ref 3.9–12.7)
WBC #/AREA URNS AUTO: >100 /HPF (ref 0–5)

## 2022-03-26 PROCEDURE — 83605 ASSAY OF LACTIC ACID: CPT | Performed by: EMERGENCY MEDICINE

## 2022-03-26 PROCEDURE — C9399 UNCLASSIFIED DRUGS OR BIOLOG: HCPCS | Performed by: PHYSICIAN ASSISTANT

## 2022-03-26 PROCEDURE — G0378 HOSPITAL OBSERVATION PER HR: HCPCS

## 2022-03-26 PROCEDURE — 80053 COMPREHEN METABOLIC PANEL: CPT | Performed by: EMERGENCY MEDICINE

## 2022-03-26 PROCEDURE — 96374 THER/PROPH/DIAG INJ IV PUSH: CPT

## 2022-03-26 PROCEDURE — 84132 ASSAY OF SERUM POTASSIUM: CPT | Performed by: PHYSICIAN ASSISTANT

## 2022-03-26 PROCEDURE — 93010 EKG 12-LEAD: ICD-10-PCS | Mod: ,,, | Performed by: INTERNAL MEDICINE

## 2022-03-26 PROCEDURE — 99285 EMERGENCY DEPT VISIT HI MDM: CPT | Mod: ,,, | Performed by: EMERGENCY MEDICINE

## 2022-03-26 PROCEDURE — 63600175 PHARM REV CODE 636 W HCPCS: Performed by: PHYSICIAN ASSISTANT

## 2022-03-26 PROCEDURE — 93005 ELECTROCARDIOGRAM TRACING: CPT

## 2022-03-26 PROCEDURE — 93010 ELECTROCARDIOGRAM REPORT: CPT | Mod: ,,, | Performed by: INTERNAL MEDICINE

## 2022-03-26 PROCEDURE — 99285 EMERGENCY DEPT VISIT HI MDM: CPT | Mod: 25

## 2022-03-26 PROCEDURE — 83735 ASSAY OF MAGNESIUM: CPT | Performed by: PHYSICIAN ASSISTANT

## 2022-03-26 PROCEDURE — 99285 PR EMERGENCY DEPT VISIT,LEVEL V: ICD-10-PCS | Mod: ,,, | Performed by: EMERGENCY MEDICINE

## 2022-03-26 PROCEDURE — 83880 ASSAY OF NATRIURETIC PEPTIDE: CPT | Performed by: EMERGENCY MEDICINE

## 2022-03-26 PROCEDURE — 99220 PR INITIAL OBSERVATION CARE,LEVL III: CPT | Mod: ,,, | Performed by: PHYSICIAN ASSISTANT

## 2022-03-26 PROCEDURE — 94761 N-INVAS EAR/PLS OXIMETRY MLT: CPT

## 2022-03-26 PROCEDURE — 85025 COMPLETE CBC W/AUTO DIFF WBC: CPT | Performed by: EMERGENCY MEDICINE

## 2022-03-26 PROCEDURE — 25000003 PHARM REV CODE 250: Performed by: PHYSICIAN ASSISTANT

## 2022-03-26 PROCEDURE — 36415 COLL VENOUS BLD VENIPUNCTURE: CPT | Performed by: PHYSICIAN ASSISTANT

## 2022-03-26 PROCEDURE — 83036 HEMOGLOBIN GLYCOSYLATED A1C: CPT | Performed by: PHYSICIAN ASSISTANT

## 2022-03-26 PROCEDURE — 81001 URINALYSIS AUTO W/SCOPE: CPT | Performed by: EMERGENCY MEDICINE

## 2022-03-26 PROCEDURE — 96372 THER/PROPH/DIAG INJ SC/IM: CPT | Mod: 59 | Performed by: PHYSICIAN ASSISTANT

## 2022-03-26 PROCEDURE — 87086 URINE CULTURE/COLONY COUNT: CPT | Performed by: EMERGENCY MEDICINE

## 2022-03-26 PROCEDURE — 99220 PR INITIAL OBSERVATION CARE,LEVL III: ICD-10-PCS | Mod: ,,, | Performed by: PHYSICIAN ASSISTANT

## 2022-03-26 RX ORDER — INSULIN ASPART 100 [IU]/ML
0-5 INJECTION, SOLUTION INTRAVENOUS; SUBCUTANEOUS
Status: DISCONTINUED | OUTPATIENT
Start: 2022-03-26 | End: 2022-03-27 | Stop reason: HOSPADM

## 2022-03-26 RX ORDER — SIMETHICONE 80 MG
1 TABLET,CHEWABLE ORAL 4 TIMES DAILY PRN
Status: DISCONTINUED | OUTPATIENT
Start: 2022-03-26 | End: 2022-03-27 | Stop reason: HOSPADM

## 2022-03-26 RX ORDER — NALOXONE HCL 0.4 MG/ML
0.02 VIAL (ML) INJECTION
Status: DISCONTINUED | OUTPATIENT
Start: 2022-03-26 | End: 2022-03-27 | Stop reason: HOSPADM

## 2022-03-26 RX ORDER — FUROSEMIDE 10 MG/ML
40 INJECTION INTRAMUSCULAR; INTRAVENOUS DAILY
Status: DISCONTINUED | OUTPATIENT
Start: 2022-03-26 | End: 2022-03-27 | Stop reason: HOSPADM

## 2022-03-26 RX ORDER — NIFEDIPINE 30 MG/1
30 TABLET, EXTENDED RELEASE ORAL DAILY
Status: DISCONTINUED | OUTPATIENT
Start: 2022-03-26 | End: 2022-03-27 | Stop reason: HOSPADM

## 2022-03-26 RX ORDER — PREGABALIN 75 MG/1
75 CAPSULE ORAL 3 TIMES DAILY
Status: DISCONTINUED | OUTPATIENT
Start: 2022-03-26 | End: 2022-03-26

## 2022-03-26 RX ORDER — CLOPIDOGREL BISULFATE 75 MG/1
75 TABLET ORAL DAILY
Status: DISCONTINUED | OUTPATIENT
Start: 2022-03-26 | End: 2022-03-27 | Stop reason: HOSPADM

## 2022-03-26 RX ORDER — IPRATROPIUM BROMIDE AND ALBUTEROL SULFATE 2.5; .5 MG/3ML; MG/3ML
3 SOLUTION RESPIRATORY (INHALATION) EVERY 4 HOURS PRN
Status: DISCONTINUED | OUTPATIENT
Start: 2022-03-26 | End: 2022-03-27 | Stop reason: HOSPADM

## 2022-03-26 RX ORDER — ALLOPURINOL 100 MG/1
100 TABLET ORAL DAILY
Status: DISCONTINUED | OUTPATIENT
Start: 2022-03-26 | End: 2022-03-27 | Stop reason: HOSPADM

## 2022-03-26 RX ORDER — TALC
6 POWDER (GRAM) TOPICAL NIGHTLY PRN
Status: DISCONTINUED | OUTPATIENT
Start: 2022-03-26 | End: 2022-03-27 | Stop reason: HOSPADM

## 2022-03-26 RX ORDER — ACETAMINOPHEN 500 MG
1000 TABLET ORAL EVERY 8 HOURS PRN
Status: DISCONTINUED | OUTPATIENT
Start: 2022-03-26 | End: 2022-03-27 | Stop reason: HOSPADM

## 2022-03-26 RX ORDER — SODIUM CHLORIDE 0.9 % (FLUSH) 0.9 %
10 SYRINGE (ML) INJECTION
Status: DISCONTINUED | OUTPATIENT
Start: 2022-03-26 | End: 2022-03-27 | Stop reason: HOSPADM

## 2022-03-26 RX ORDER — IBUPROFEN 200 MG
16 TABLET ORAL
Status: DISCONTINUED | OUTPATIENT
Start: 2022-03-26 | End: 2022-03-27 | Stop reason: HOSPADM

## 2022-03-26 RX ORDER — IBUPROFEN 200 MG
24 TABLET ORAL
Status: DISCONTINUED | OUTPATIENT
Start: 2022-03-26 | End: 2022-03-27 | Stop reason: HOSPADM

## 2022-03-26 RX ORDER — MIRTAZAPINE 15 MG/1
15 TABLET, FILM COATED ORAL NIGHTLY
Status: DISCONTINUED | OUTPATIENT
Start: 2022-03-26 | End: 2022-03-27 | Stop reason: HOSPADM

## 2022-03-26 RX ORDER — ACETAMINOPHEN 325 MG/1
650 TABLET ORAL EVERY 4 HOURS PRN
Status: DISCONTINUED | OUTPATIENT
Start: 2022-03-26 | End: 2022-03-27 | Stop reason: HOSPADM

## 2022-03-26 RX ORDER — GLUCAGON 1 MG
1 KIT INJECTION
Status: DISCONTINUED | OUTPATIENT
Start: 2022-03-26 | End: 2022-03-27 | Stop reason: HOSPADM

## 2022-03-26 RX ORDER — BISACODYL 10 MG
10 SUPPOSITORY, RECTAL RECTAL DAILY PRN
Status: DISCONTINUED | OUTPATIENT
Start: 2022-03-26 | End: 2022-03-27 | Stop reason: HOSPADM

## 2022-03-26 RX ORDER — ONDANSETRON 8 MG/1
8 TABLET, ORALLY DISINTEGRATING ORAL EVERY 8 HOURS PRN
Status: DISCONTINUED | OUTPATIENT
Start: 2022-03-26 | End: 2022-03-27 | Stop reason: HOSPADM

## 2022-03-26 RX ORDER — PROCHLORPERAZINE EDISYLATE 5 MG/ML
5 INJECTION INTRAMUSCULAR; INTRAVENOUS EVERY 6 HOURS PRN
Status: DISCONTINUED | OUTPATIENT
Start: 2022-03-26 | End: 2022-03-27 | Stop reason: HOSPADM

## 2022-03-26 RX ORDER — SODIUM BICARBONATE 650 MG/1
650 TABLET ORAL 2 TIMES DAILY
Status: DISCONTINUED | OUTPATIENT
Start: 2022-03-26 | End: 2022-03-27 | Stop reason: HOSPADM

## 2022-03-26 RX ORDER — MAG HYDROX/ALUMINUM HYD/SIMETH 200-200-20
30 SUSPENSION, ORAL (FINAL DOSE FORM) ORAL 4 TIMES DAILY PRN
Status: DISCONTINUED | OUTPATIENT
Start: 2022-03-26 | End: 2022-03-27 | Stop reason: HOSPADM

## 2022-03-26 RX ORDER — ATORVASTATIN CALCIUM 40 MG/1
80 TABLET, FILM COATED ORAL NIGHTLY
Status: DISCONTINUED | OUTPATIENT
Start: 2022-03-26 | End: 2022-03-27 | Stop reason: HOSPADM

## 2022-03-26 RX ORDER — POLYETHYLENE GLYCOL 3350 17 G/17G
17 POWDER, FOR SOLUTION ORAL DAILY
Status: DISCONTINUED | OUTPATIENT
Start: 2022-03-26 | End: 2022-03-27 | Stop reason: HOSPADM

## 2022-03-26 RX ORDER — CARVEDILOL 3.12 MG/1
3.12 TABLET ORAL 2 TIMES DAILY
Status: DISCONTINUED | OUTPATIENT
Start: 2022-03-26 | End: 2022-03-27 | Stop reason: HOSPADM

## 2022-03-26 RX ORDER — PREGABALIN 75 MG/1
75 CAPSULE ORAL 2 TIMES DAILY
Status: DISCONTINUED | OUTPATIENT
Start: 2022-03-26 | End: 2022-03-27 | Stop reason: HOSPADM

## 2022-03-26 RX ORDER — AMITRIPTYLINE HYDROCHLORIDE 10 MG/1
10 TABLET, FILM COATED ORAL NIGHTLY PRN
Status: DISCONTINUED | OUTPATIENT
Start: 2022-03-26 | End: 2022-03-27 | Stop reason: HOSPADM

## 2022-03-26 RX ORDER — OXYCODONE HYDROCHLORIDE 5 MG/1
5 TABLET ORAL EVERY 6 HOURS PRN
Status: DISCONTINUED | OUTPATIENT
Start: 2022-03-26 | End: 2022-03-27 | Stop reason: HOSPADM

## 2022-03-26 RX ORDER — SERTRALINE HYDROCHLORIDE 50 MG/1
50 TABLET, FILM COATED ORAL DAILY
Status: DISCONTINUED | OUTPATIENT
Start: 2022-03-26 | End: 2022-03-27 | Stop reason: HOSPADM

## 2022-03-26 RX ADMIN — PREGABALIN 75 MG: 75 CAPSULE ORAL at 09:03

## 2022-03-26 RX ADMIN — ATORVASTATIN CALCIUM 80 MG: 40 TABLET, FILM COATED ORAL at 09:03

## 2022-03-26 RX ADMIN — INSULIN ASPART 1 UNITS: 100 INJECTION, SOLUTION INTRAVENOUS; SUBCUTANEOUS at 09:03

## 2022-03-26 RX ADMIN — OXYCODONE 5 MG: 5 TABLET ORAL at 09:03

## 2022-03-26 RX ADMIN — AMITRIPTYLINE HYDROCHLORIDE 10 MG: 10 TABLET, FILM COATED ORAL at 09:03

## 2022-03-26 RX ADMIN — SODIUM BICARBONATE 650 MG TABLET 650 MG: at 09:03

## 2022-03-26 RX ADMIN — CARVEDILOL 3.12 MG: 3.12 TABLET, FILM COATED ORAL at 08:03

## 2022-03-26 RX ADMIN — ALLOPURINOL 100 MG: 100 TABLET ORAL at 08:03

## 2022-03-26 RX ADMIN — FUROSEMIDE 40 MG: 10 INJECTION, SOLUTION INTRAMUSCULAR; INTRAVENOUS at 09:03

## 2022-03-26 RX ADMIN — CARVEDILOL 3.12 MG: 3.12 TABLET, FILM COATED ORAL at 09:03

## 2022-03-26 RX ADMIN — OXYCODONE 5 MG: 5 TABLET ORAL at 08:03

## 2022-03-26 RX ADMIN — INSULIN DETEMIR 5 UNITS: 100 INJECTION, SOLUTION SUBCUTANEOUS at 01:03

## 2022-03-26 RX ADMIN — SODIUM BICARBONATE 650 MG TABLET 650 MG: at 08:03

## 2022-03-26 RX ADMIN — CLOPIDOGREL 75 MG: 75 TABLET, FILM COATED ORAL at 08:03

## 2022-03-26 RX ADMIN — NIFEDIPINE 30 MG: 30 TABLET, FILM COATED, EXTENDED RELEASE ORAL at 08:03

## 2022-03-26 RX ADMIN — SERTRALINE HYDROCHLORIDE 50 MG: 50 TABLET ORAL at 08:03

## 2022-03-26 RX ADMIN — MIRTAZAPINE 15 MG: 15 TABLET, FILM COATED ORAL at 09:03

## 2022-03-26 NOTE — PROVIDER PROGRESS NOTES - EMERGENCY DEPT.
Encounter Date: 3/26/2022    ED Physician Progress Notes         EKG - STEMI Decision  Initial Reading: No STEMI present.

## 2022-03-26 NOTE — ASSESSMENT & PLAN NOTE
Cr BL wanders, 1.8-2.0  Cr 2.5 on admission  Suspect CRS given she has been without lasix and has significant stump swelling  K 5.8 on admission, Lasix IVP given, will repeat K+ this afternoon  Strict I/Os, daily wts

## 2022-03-26 NOTE — ED NOTES
Pt is sleeping,  resting at bedside. NAD noted, VSS. BP cuff and pulse ox alarms are set. Bed low and locked, side rails up x2. Call light within reach of pt. Will continue to monitor.

## 2022-03-26 NOTE — ASSESSMENT & PLAN NOTE
Patient has hx afib, on coreg  She had been on eliquis, unsure why it was discontinued. Perhaps she was instructed to hold it for surgery and never resumed? Will try to clarify with her. Ok to resume per vasc surg

## 2022-03-26 NOTE — H&P
[facility] ([address] Medicine  History & Physical    Patient Name: Suyapa Connelly  MRN: [id number]  Patient Class: OP- Observation  Admission Date: 3/26/2022  Attending Physician: Ruma Schulte MD   Primary Care Provider: Magen Christensen MD         Patient information was obtained from patient, past medical records and ER records.     Subjective:     Principal Problem:Acute on chronic diastolic CHF (congestive heart failure)    Chief Complaint:   Chief Complaint   Patient presents with    Leg Swelling     Pt has bilateral leg swelling. Pt was supposed to be a transfer here last night for vascular sugery but decided to cancel and come here pov        HPI: Suyapa Connelly is a 56F with CAD s/p CABG, afib, CKD III, T2DM, osteo with steomyelitis with previous left AKA and newer Right BKA (12/2021), subsequent revision for poor healing secondary to PAD (Jan 2022), revised RAKA 3/18 who presents for swelling of her stumps. She says that after her last admission she was supposed to be discharged with Lasix, but she never received a script. No drainage or redness to the stump, just swelling particularly at her most recently revised stump (R). She does not have CP or SOB, no cough. The pain of her stumps are at baseline.     Of note she went to an OSH for this complaint and ultimately left AMA while waiting for transfer here to be evaluated by vasc surg. There her CXR showed concern for atelectasis vs pnemonia    Afebrile without leukocytosis, Cr 2.5, (BL ~1.8-2.0) and K+ 5.8; . Plain films of stump reviewed by vascular, no concerns for infection, well appearing      Past Medical History:   Diagnosis Date    Anxiety     Chronic pain syndrome     CKD (chronic kidney disease), stage III     Depression     Diabetes mellitus     type 2    Diabetes mellitus, type 2     GERD (gastroesophageal reflux disease)     Hyperemesis 3/23/2021    Hyperlipemia     Hypertension      Hypokalemia 3/23/2021    Myocardial infarction 2010    minor-caused by stress per pt.    Osteomyelitis     Osteomyelitis of left foot 4/30/2021    PVD (peripheral vascular disease)     Vaginal delivery     x1       Past Surgical History:   Procedure Laterality Date    ABOVE-KNEE AMPUTATION Left 5/18/2021    Procedure: AMPUTATION, ABOVE KNEE;  Surgeon: Teddy Huber MD;  Location: [address];  Service: Vascular;  Laterality: Left;    ABOVE-KNEE AMPUTATION Right 3/18/2022    Procedure: AMPUTATION, ABOVE KNEE;  Surgeon: DAYNE Florez II, MD;  Location: [facility] OR [address] [place] [address];  Service: Vascular;  Laterality: Right;    Angiogram - Right Extremity Right 7/9/15    angiogram-left leg  10/6/15    ANGIOGRAPHY OF LOWER EXTREMITY Left 4/29/2021    Procedure: ANGIOGRAM, LOWER EXTREMITY;  Surgeon: Teddy Huber MD;  Location: [address];  Service: Vascular;  Laterality: Left;    BELOW KNEE AMPUTATION OF LOWER EXTREMITY Right 12/28/2021    Procedure: AMPUTATION, BELOW KNEE;  Surgeon: Kaitlyn Rojas MD;  Location: [facility];  Service: General;  Laterality: Right;    CATHETERIZATION OF BOTH LEFT AND RIGHT HEART N/A 12/18/2019    Procedure: CATHETERIZATION, HEART, BOTH LEFT AND RIGHT;  Surgeon: Que Fernando III, MD;  Location: [facility] CATH LAB;  Service: Cardiology;  Laterality: N/A;    CORONARY ANGIOGRAPHY N/A 12/18/2019    Procedure: ANGIOGRAM, CORONARY ARTERY;  Surgeon: Que Fernando III, MD;  Location: [facility] CATH LAB;  Service: Cardiology;  Laterality: N/A;    CORONARY ANGIOGRAPHY INCLUDING BYPASS GRAFTS WITH CATHETERIZATION OF LEFT HEART N/A 7/28/2020    Procedure: ANGIOGRAM, CORONARY, INCLUDING BYPASS GRAFT, WITH LEFT HEART CATHETERIZATION, 9 am;  Surgeon: Rachel Easley MD;  Location: [facility] CATH LAB;  Service: Cardiology;  Laterality: N/A;    CORONARY ARTERY BYPASS GRAFT (CABG) N/A 1/14/2020    Procedure: CORONARY ARTERY BYPASS GRAFT (CABG) x 1     Off Pump;  Surgeon: Huang CARTER  MD Adarsh;  [address]  Service: Cardiovascular;  Laterality: N/A;    CREATION OF FEMORAL-TIBIAL ARTERY BYPASS Left 4/29/2021    Procedure: CREATION, BYPASS, ARTERIAL, FEMORAL TO ANTERIOR TIBIAL;  Surgeon: Teddy Huber MD;  [facility];  Service: Vascular;  Laterality: Left;    CREATION OF FEMOROPOPLITEAL ARTERIAL BYPASS USING GRAFT Left 8/18/2020    Procedure: CREATION, BYPASS, ARTERIAL, FEMORAL TO POPLITEAL, USING GRAFT, LEFT LOWER EXTREMITY;  Surgeon: Teddy Huber MD;  Location: [facility];  Service: Vascular;  Laterality: Left;  REQUEST 7:15 A.M. START----COVID NEGATIVE ON 8/17 1ST CASE STARTE PER LEANA ON 8/7/2020 @ 942AM-LO  RN PREOP 8/12/2020   T/S-----CLEARED BY CARDS-------PENDING INSURANCE    DEBRIDEMENT OF FOOT Left 9/8/2020    Procedure: DEBRIDEMENT, FOOT;  Surgeon: Rosio Mayes DPM;  Location: [facility] OR;  Service: Podiatry;  Laterality: Left;  request neoxx .   RN Pre Op 9-4-2020, Covid negative on 9/5/20. C A    DEBRIDEMENT OF FOOT  3/4/2021    Procedure: DEBRIDEMENT, FOOT;  Surgeon: Teddy Huber MD;  Location: [facility] OR;  Service: Vascular;;    DEBRIDEMENT OF FOOT Left 3/9/2021    Procedure: DEBRIDEMENT, FOOT, bone biopsy;  Surgeon: Rosio Mayes DPM;  Location: [facility] OR;  Service: Podiatry;  Laterality: Left;  Request neoxx---COVID IN AM  REQUESTING NOON START  RN Phone Pre op.On Blood thinners Plavix and Eliquis.  Covid am of surgery. C A    DEBRIDEMENT OF FOOT Left 5/4/2021    Procedure: DEBRIDEMENT, FOOT;  Surgeon: Farooq Morley DPM;  Location: [address];  Service: Podiatry;  Laterality: Left;    INSERTION OF TUNNELED CENTRAL VENOUS HEMODIALYSIS CATHETER N/A 1/27/2020    Procedure: Insertion, Catheter, Central Venous, Hemodialysis;  Surgeon: ESTEBAN Gomez III, MD;  Location: [facility] CATH LAB;  Service: Peripheral Vascular;  Laterality: N/A;    PERCUTANEOUS TRANSLUMINAL ANGIOPLASTY N/A 3/4/2021    Procedure: PTA  (ANGIOPLASTY, PERCUTANEOUS, TRANSLUMINAL);  Surgeon: Teddy Huber MD;  Location: [facility] OR;  Service: Vascular;  Laterality: N/A;    REMOVAL OF ARTERIOVENOUS GRAFT Left 5/27/2021    Procedure: REMOVAL, GRAFT, LEFT LOWER EXTREMITY, WOUND EXPLORATION;  Surgeon: Teddy Huber MD;  Location: [facility] OR 2ND FLR;  Service: Vascular;  Laterality: Left;    REMOVAL OF NAIL OF DIGIT Left 3/9/2021    Procedure: REMOVAL, NAIL, DIGIT;  Surgeon: Rosio Mayes DPM;  Location: [facility] OR;  Service: Podiatry;  Laterality: Left;    THROMBECTOMY Left 3/4/2021    Procedure: THROMBECTOMY, LEFT LOWER EXTREMITY BYPASS GRAFT, ANGIOGRAM, POSSIBLE INTERVENTION, POSSIBLE LEFT LOWER EXTREMITY BYPASS;  Surgeon: Teddy Huber MD;  Location: [facility] OR;  Service: Vascular;  Laterality: Left;    THROMBECTOMY Left 4/29/2021    Procedure: GRAFT THROMBECTOMY, LEFT LOWER EXTREMITY;  Surgeon: Teddy Huber MD;  Location: [facility] OR 2ND FLR;  Service: Vascular;  Laterality: Left;  14.5 min  1179.85 mGy  341.01 Gycm2  240 ml dye    THROMBECTOMY  10/22/2021    Procedure: THROMBECTOMY;  Surgeon: Saad Arenas MD;  Location: [facility] CATH LAB/EP;  Service: Cardiology;;       Review of patient's allergies indicates:   Allergen Reactions    Ciprofloxacin Itching    Contrast media      Kidney injury    Iodine      Kidney injury       No current facility-administered medications on file prior to encounter.     Current Outpatient Medications on File Prior to Encounter   Medication Sig    acetaminophen (TYLENOL) 500 MG tablet Take 2 tablets (1,000 mg total) by mouth every 8 (eight) hours as needed for Pain.    allopurinoL (ZYLOPRIM) 100 MG tablet Take 1 tablet (100 mg total) by mouth once daily.    amitriptyline (ELAVIL) 10 MG tablet Take 1 tablet (10 mg total) by mouth nightly as needed for Pain.    atorvastatin (LIPITOR) 80 MG tablet Take 1 tablet (80 mg total) by mouth every evening.    blood-glucose meter,continuous (DEXCOM G6  ) Misc Use to check blood sugars 4 times/day    blood-glucose sensor (DEXCOM G6 SENSOR) Radha Apply one sensor to skin every 10 days    blood-glucose transmitter (DEXCOM G6 TRANSMITTER) Radha Replace transmitter every 3 months to use with dexcom sensor    carvediloL (COREG) 3.125 MG tablet Take 1 tablet (3.125 mg total) by mouth 2 (two) times daily.    clopidogreL (PLAVIX) 75 mg tablet Take 1 tablet (75 mg total) by mouth once daily.    furosemide (LASIX) 40 MG tablet Take 1 tablet (40 mg total) by mouth once daily. Please hold until follow up with PCP    insulin aspart U-100 (NOVOLOG FLEXPEN U-100 INSULIN) 100 unit/mL (3 mL) InPn pen Inject 5 Units into the skin 3 (three) times daily with meals. If not eating, ok to hold    insulin detemir U-100 (LEVEMIR FLEXTOUCH) 100 unit/mL (3 mL) SubQ InPn pen Inject 12 Units into the skin every evening.    magnesium oxide (MAG-OX) 400 mg (241.3 mg magnesium) tablet Take 1 tablet (400 mg total) by mouth 2 (two) times daily.    melatonin (MELATIN) 3 mg tablet Take 2 tablets (6 mg total) by mouth nightly as needed for Insomnia.    mirtazapine (REMERON) 7.5 MG Tab Take 2 tablets (15 mg total) by mouth nightly.    multivit/folic acid/vit K1 (ONE-A-DAY WOMEN'S 50 PLUS ORAL) Take 1 tablet by mouth Daily.    NIFEdipine (PROCARDIA-XL) 30 MG (OSM) 24 hr tablet Take 1 tablet (30 mg total) by mouth once daily.    ondansetron (ZOFRAN-ODT) 8 MG TbDL Dissolve 1 tablet (8 mg total) by mouth every 8 (eight) hours as needed.    oxyCODONE (ROXICODONE) 5 MG immediate release tablet Take 1 tablet (5 mg total) by mouth every 6 (six) hours as needed for Pain.    pregabalin (LYRICA) 75 MG capsule Take 1 capsule (75 mg total) by mouth 3 (three) times daily.    sertraline (ZOLOFT) 50 MG tablet Take 1 tablet (50 mg total) by mouth once daily.    sodium bicarbonate 650 MG tablet Take 1 tablet (650 mg total) by mouth 2 (two) times daily. Please hold until follow up with Primary Care  Provider    [DISCONTINUED] lancing device Misc 1 Device by Misc.(Non-Drug; Combo Route) route 2 (two) times daily with meals.    [DISCONTINUED] rivaroxaban (XARELTO) 2.5 mg Tab Take 1 tablet (2.5 mg total) by mouth 2 (two) times daily with meals.     Family History       Problem Relation (Age of Onset)    Diabetes Mother, Father, Paternal Grandmother    Heart disease Maternal Grandmother    No Known Problems Maternal Grandfather, Paternal Grandfather          Tobacco Use    Smoking status: Former Smoker    Smokeless tobacco: Never Used   Substance and Sexual Activity    Alcohol use: No    Drug use: Yes     Types: Marijuana     Comment: occassional    Sexual activity: Yes     Partners: Male     Review of Systems   Constitutional:  Negative for fatigue and fever.   Respiratory:  Negative for cough, chest tightness and shortness of breath.    Cardiovascular:  Positive for leg swelling. Negative for chest pain.   Gastrointestinal:  Negative for abdominal pain, diarrhea, nausea and vomiting.   Genitourinary:  Negative for difficulty urinating.   Musculoskeletal:  Positive for arthralgias and gait problem.   Skin:  Positive for wound (surgical site). Negative for color change.   Neurological:  Negative for syncope and headaches.   Psychiatric/Behavioral:  Negative for agitation and confusion.    Objective:     Vital Signs (Most Recent):  Temp: 98.6 °F (37 °C) (03/26/22 1201)  Pulse: 75 (03/26/22 1201)  Resp: 17 (03/26/22 1201)  BP: 116/76 (03/26/22 1201)  SpO2: 95 % (03/26/22 1201) Vital Signs (24h Range):  Temp:  [98 °F (36.7 °C)-98.6 °F (37 °C)] 98.6 °F (37 °C)  Pulse:  [75-96] 75  Resp:  [15-20] 17  SpO2:  [95 %-100 %] 95 %  BP: (112-140)/(53-76) 116/76        There is no height or weight on file to calculate BMI.    Physical Exam  Constitutional:       General: She is not in acute distress.     Appearance: She is not ill-appearing.   HENT:      Head: Normocephalic and atraumatic.   Eyes:      Extraocular  Movements: Extraocular movements intact.   Cardiovascular:      Rate and Rhythm: Normal rate and regular rhythm.   Pulmonary:      Effort: Pulmonary effort is normal. No respiratory distress.      Breath sounds: No wheezing.   Abdominal:      General: Abdomen is flat. Bowel sounds are normal. There is no distension.      Palpations: Abdomen is soft.   Musculoskeletal:      Right lower leg: Edema present.      Left lower leg: Edema present.      Comments: Bilateral AKA, surgical incision appears well healed, no drainage   Skin:     General: Skin is warm and dry.   Neurological:      General: No focal deficit present.      Mental Status: She is alert.   Psychiatric:         Mood and Affect: Mood normal.         Behavior: Behavior normal.           Significant Labs: All pertinent labs within the past 24 hours have been reviewed.    Significant Imaging: I have reviewed all pertinent imaging results/findings within the past 24 hours.    Assessment/Plan:     * Acute on chronic diastolic CHF (congestive heart failure)  Patient here with swelling of her stump, no signs of infection. Unfortunately did not have her script for lasix 40mg PO, suspect hypervolemia and ROSLYN (see below) related to CHF    - maintaining sats ORA  -   - CXR pending  - will start CHF pathway: diuresis with lasix 40mg IVP daily  - cardiac, fluid restricted diet  - strict I/os, daily wts  - will need prescription for lasix at dc, optimally sent for bedside delivery so she has medication prior to dc      Acute-on-chronic renal failure  Cr BL wanders, 1.8-2.0  Cr 2.5 on admission  Suspect CRS given she has been without lasix and has significant stump swelling  K 5.8 on admission, Lasix IVP given, will repeat K+ this afternoon  Strict I/Os, daily wts      Status post above-knee amputation of both lower extremities  Extensive surgical history, has had bilateral AKA with multiple revisions from complications of osteomyelitis, PAD  Recently underwent R  AKA 3/18/22  Per Vascu surg: Right AKA healing well with no evidence of infection. XR showing minimal air likely post-surgical.  -Well-healing AKA stump. She may follow up outpatient in 2 weeks for staples/sutures removal with Dr. Florez. If she gets admitted we will follow while inpatient.        Coronary atherosclerosis  S/p CABG  Continue home ASA and plavix, lipitor 80      Paroxysmal atrial fibrillation  Patient has hx afib, on coreg  She had been on eliquis, unsure why it was discontinued. Perhaps she was instructed to hold it for surgery and never resumed? Will try to clarify with her. Ok to resume per vasc surg      Type 2 diabetes mellitus with diabetic nephropathy, with long-term current use of insulin  Lab Results   Component Value Date    HGBA1C 6.9 (H) 03/26/2022     Diabetic diet, low dose SSI  Accuchecks ACHS      Chronic anemia  Stable and at BL ~8      Adjustment disorder with mixed anxiety and depressed mood  Ci=ontinue home sertraline and mirtazipine        VTE Risk Mitigation (From admission, onward)         Ordered     IP VTE HIGH RISK PATIENT  Once         03/26/22 1053     Place sequential compression device  Until discontinued         03/26/22 0823                   Cecilia Ricardo PA-C  Department of Hospital Medicine   [facility] - Telemetry Stepdown ([place]-[facility])

## 2022-03-26 NOTE — ASSESSMENT & PLAN NOTE
Patient here with swelling of her stump, no signs of infection. Unfortunately did not have her script for lasix 40mg PO, suspect hypervolemia and ROSLYN (see below) related to CHF    - maintaining sats ORA  -   - CXR pending  - will start CHF pathway: diuresis with lasix 40mg IVP daily  - cardiac, fluid restricted diet  - strict I/os, daily wts  - will need prescription for lasix at dc, optimally sent for bedside delivery so she has medication prior to dc

## 2022-03-26 NOTE — SUBJECTIVE & OBJECTIVE
Past Medical History:   Diagnosis Date    Anxiety     Chronic pain syndrome     CKD (chronic kidney disease), stage III     Depression     Diabetes mellitus     type 2    Diabetes mellitus, type 2     GERD (gastroesophageal reflux disease)     Hyperemesis 3/23/2021    Hyperlipemia     Hypertension     Hypokalemia 3/23/2021    Myocardial infarction 2010    minor-caused by stress per pt.    Osteomyelitis     Osteomyelitis of left foot 4/30/2021    PVD (peripheral vascular disease)     Vaginal delivery     x1       Past Surgical History:   Procedure Laterality Date    ABOVE-KNEE AMPUTATION Left 5/18/2021    Procedure: AMPUTATION, ABOVE KNEE;  Surgeon: Teddy Huber MD;  Location: [facility] OR [address] [place] [address];  Service: Vascular;  Laterality: Left;    ABOVE-KNEE AMPUTATION Right 3/18/2022    Procedure: AMPUTATION, ABOVE KNEE;  Surgeon: DAYNE Florez II, MD;  Location: [facility] OR [address] [place] [address];  Service: Vascular;  Laterality: Right;    Angiogram - Right Extremity Right 7/9/15    angiogram-left leg  10/6/15    ANGIOGRAPHY OF LOWER EXTREMITY Left 4/29/2021    Procedure: ANGIOGRAM, LOWER EXTREMITY;  Surgeon: Teddy Huber MD;  Location: [address];  Service: Vascular;  Laterality: Left;    BELOW KNEE AMPUTATION OF LOWER EXTREMITY Right 12/28/2021    Procedure: AMPUTATION, BELOW KNEE;  Surgeon: Kaitlyn Rojas MD;  Location: [facility];  Service: General;  Laterality: Right;    CATHETERIZATION OF BOTH LEFT AND RIGHT HEART N/A 12/18/2019    Procedure: CATHETERIZATION, HEART, BOTH LEFT AND RIGHT;  Surgeon: Que Fernando III, MD;  Location: [facility] CATH LAB;  Service: Cardiology;  Laterality: N/A;    CORONARY ANGIOGRAPHY N/A 12/18/2019    Procedure: ANGIOGRAM, CORONARY ARTERY;  Surgeon: Que Fernando III, MD;  Location: [facility] CATH LAB;  Service: Cardiology;  Laterality: N/A;    CORONARY ANGIOGRAPHY INCLUDING BYPASS GRAFTS WITH CATHETERIZATION OF LEFT HEART N/A 7/28/2020    Procedure: ANGIOGRAM, CORONARY, INCLUDING  BYPASS GRAFT, WITH LEFT HEART CATHETERIZATION, 9 am;  Surgeon: Rachel Easley MD;  Location: [facility] CATH LAB;  Service: Cardiology;  Laterality: N/A;    CORONARY ARTERY BYPASS GRAFT (CABG) N/A 1/14/2020    Procedure: CORONARY ARTERY BYPASS GRAFT (CABG) x 1     Off Pump;  Surgeon: Huang Altamirano MD;  Location: [facility] OR [address] [place] [address];  Service: Cardiovascular;  Laterality: N/A;    CREATION OF FEMORAL-TIBIAL ARTERY BYPASS Left 4/29/2021    Procedure: CREATION, BYPASS, ARTERIAL, FEMORAL TO ANTERIOR TIBIAL;  Surgeon: Teddy Huber MD;  [facility];  Service: Vascular;  Laterality: Left;    CREATION OF FEMOROPOPLITEAL ARTERIAL BYPASS USING GRAFT Left 8/18/2020    Procedure: CREATION, BYPASS, ARTERIAL, FEMORAL TO POPLITEAL, USING GRAFT, LEFT LOWER EXTREMITY;  Surgeon: Teddy Huber MD;  Location: [facility];  Service: Vascular;  Laterality: Left;  REQUEST 7:15 A.M. START----COVID NEGATIVE ON 8/17  1ST CASE STARTE PER LEANA ON 8/7/2020 @ 942AM-  RN PREOP 8/12/2020   T/S-----CLEARED BY CARDS-------PENDING INSURANCE    DEBRIDEMENT OF FOOT Left 9/8/2020    Procedure: DEBRIDEMENT, FOOT;  Surgeon: Rosio Mayes DPM;  Location: [facility];  Service: Podiatry;  Laterality: Left;  request neoxx .   RN Pre Op 9-4-2020, Covid negative on 9/5/20. C A    DEBRIDEMENT OF FOOT  3/4/2021    Procedure: DEBRIDEMENT, FOOT;  Surgeon: Teddy Huber MD;  Location: [facility] OR;  Service: Vascular;;    DEBRIDEMENT OF FOOT Left 3/9/2021    Procedure: DEBRIDEMENT, FOOT, bone biopsy;  Surgeon: Rosio Mayes DPM;  Location: [facility] OR;  Service: Podiatry;  Laterality: Left;  Request neoxx---COVID IN AM  REQUESTING NOON START  RN Phone Pre op.On Blood thinners Plavix and Eliquis.  Covid am of surgery. C A    DEBRIDEMENT OF FOOT Left 5/4/2021    Procedure: DEBRIDEMENT, FOOT;  Surgeon: Farooq Morley DPM;  [address]  Service: Podiatry;  Laterality: Left;    INSERTION OF TUNNELED CENTRAL VENOUS HEMODIALYSIS  CATHETER N/A 1/27/2020    Procedure: Insertion, Catheter, Central Venous, Hemodialysis;  Surgeon: ESTEBAN Gomez III, MD;  Location: [facility] CATH LAB;  Service: Peripheral Vascular;  Laterality: N/A;    PERCUTANEOUS TRANSLUMINAL ANGIOPLASTY N/A 3/4/2021    Procedure: PTA (ANGIOPLASTY, PERCUTANEOUS, TRANSLUMINAL);  Surgeon: Teddy Huber MD;  Location: [facility] OR;  Service: Vascular;  Laterality: N/A;    REMOVAL OF ARTERIOVENOUS GRAFT Left 5/27/2021    Procedure: REMOVAL, GRAFT, LEFT LOWER EXTREMITY, WOUND EXPLORATION;  Surgeon: Teddy Huber MD;  Location: [facility] OR 2ND FLR;  Service: Vascular;  Laterality: Left;    REMOVAL OF NAIL OF DIGIT Left 3/9/2021    Procedure: REMOVAL, NAIL, DIGIT;  Surgeon: Rosio Mayes DPM;  Location: [facility] OR;  Service: Podiatry;  Laterality: Left;    THROMBECTOMY Left 3/4/2021    Procedure: THROMBECTOMY, LEFT LOWER EXTREMITY BYPASS GRAFT, ANGIOGRAM, POSSIBLE INTERVENTION, POSSIBLE LEFT LOWER EXTREMITY BYPASS;  Surgeon: Teddy Huber MD;  Location: [facility] OR;  Service: Vascular;  Laterality: Left;    THROMBECTOMY Left 4/29/2021    Procedure: GRAFT THROMBECTOMY, LEFT LOWER EXTREMITY;  Surgeon: Teddy Huber MD;  Location: [facility] OR 2ND FLR;  Service: Vascular;  Laterality: Left;  14.5 min  1179.85 mGy  341.01 Gycm2  240 ml dye    THROMBECTOMY  10/22/2021    Procedure: THROMBECTOMY;  Surgeon: Saad Arenas MD;  Location: [facility] CATH LAB/EP;  Service: Cardiology;;       Review of patient's allergies indicates:   Allergen Reactions    Ciprofloxacin Itching    Contrast media      Kidney injury    Iodine      Kidney injury       No current facility-administered medications on file prior to encounter.     Current Outpatient Medications on File Prior to Encounter   Medication Sig    acetaminophen (TYLENOL) 500 MG tablet Take 2 tablets (1,000 mg total) by mouth every 8 (eight) hours as needed for Pain.    allopurinoL (ZYLOPRIM) 100 MG tablet Take 1 tablet (100 mg total) by  mouth once daily.    amitriptyline (ELAVIL) 10 MG tablet Take 1 tablet (10 mg total) by mouth nightly as needed for Pain.    atorvastatin (LIPITOR) 80 MG tablet Take 1 tablet (80 mg total) by mouth every evening.    blood-glucose meter,continuous (DEXCOM G6 ) Misc Use to check blood sugars 4 times/day    blood-glucose sensor (DEXCOM G6 SENSOR) Radha Apply one sensor to skin every 10 days    blood-glucose transmitter (DEXCOM G6 TRANSMITTER) Radha Replace transmitter every 3 months to use with dexcom sensor    carvediloL (COREG) 3.125 MG tablet Take 1 tablet (3.125 mg total) by mouth 2 (two) times daily.    clopidogreL (PLAVIX) 75 mg tablet Take 1 tablet (75 mg total) by mouth once daily.    furosemide (LASIX) 40 MG tablet Take 1 tablet (40 mg total) by mouth once daily. Please hold until follow up with PCP    insulin aspart U-100 (NOVOLOG FLEXPEN U-100 INSULIN) 100 unit/mL (3 mL) InPn pen Inject 5 Units into the skin 3 (three) times daily with meals. If not eating, ok to hold    insulin detemir U-100 (LEVEMIR FLEXTOUCH) 100 unit/mL (3 mL) SubQ InPn pen Inject 12 Units into the skin every evening.    magnesium oxide (MAG-OX) 400 mg (241.3 mg magnesium) tablet Take 1 tablet (400 mg total) by mouth 2 (two) times daily.    melatonin (MELATIN) 3 mg tablet Take 2 tablets (6 mg total) by mouth nightly as needed for Insomnia.    mirtazapine (REMERON) 7.5 MG Tab Take 2 tablets (15 mg total) by mouth nightly.    multivit/folic acid/vit K1 (ONE-A-DAY WOMEN'S 50 PLUS ORAL) Take 1 tablet by mouth Daily.    NIFEdipine (PROCARDIA-XL) 30 MG (OSM) 24 hr tablet Take 1 tablet (30 mg total) by mouth once daily.    ondansetron (ZOFRAN-ODT) 8 MG TbDL Dissolve 1 tablet (8 mg total) by mouth every 8 (eight) hours as needed.    oxyCODONE (ROXICODONE) 5 MG immediate release tablet Take 1 tablet (5 mg total) by mouth every 6 (six) hours as needed for Pain.    pregabalin (LYRICA) 75 MG capsule Take 1 capsule (75 mg total) by mouth 3  (three) times daily.    sertraline (ZOLOFT) 50 MG tablet Take 1 tablet (50 mg total) by mouth once daily.    sodium bicarbonate 650 MG tablet Take 1 tablet (650 mg total) by mouth 2 (two) times daily. Please hold until follow up with Primary Care Provider    [DISCONTINUED] lancing device Misc 1 Device by Misc.(Non-Drug; Combo Route) route 2 (two) times daily with meals.    [DISCONTINUED] rivaroxaban (XARELTO) 2.5 mg Tab Take 1 tablet (2.5 mg total) by mouth 2 (two) times daily with meals.     Family History       Problem Relation (Age of Onset)    Diabetes Mother, Father, Paternal Grandmother    Heart disease Maternal Grandmother    No Known Problems Maternal Grandfather, Paternal Grandfather          Tobacco Use    Smoking status: Former Smoker    Smokeless tobacco: Never Used   Substance and Sexual Activity    Alcohol use: No    Drug use: Yes     Types: Marijuana     Comment: occassional    Sexual activity: Yes     Partners: Male     Review of Systems   Constitutional:  Negative for fatigue and fever.   Respiratory:  Negative for cough, chest tightness and shortness of breath.    Cardiovascular:  Positive for leg swelling. Negative for chest pain.   Gastrointestinal:  Negative for abdominal pain, diarrhea, nausea and vomiting.   Genitourinary:  Negative for difficulty urinating.   Musculoskeletal:  Positive for arthralgias and gait problem.   Skin:  Positive for wound (surgical site). Negative for color change.   Neurological:  Negative for syncope and headaches.   Psychiatric/Behavioral:  Negative for agitation and confusion.    Objective:     Vital Signs (Most Recent):  Temp: 98.6 °F (37 °C) (03/26/22 1201)  Pulse: 75 (03/26/22 1201)  Resp: 17 (03/26/22 1201)  BP: 116/76 (03/26/22 1201)  SpO2: 95 % (03/26/22 1201) Vital Signs (24h Range):  Temp:  [98 °F (36.7 °C)-98.6 °F (37 °C)] 98.6 °F (37 °C)  Pulse:  [75-96] 75  Resp:  [15-20] 17  SpO2:  [95 %-100 %] 95 %  BP: (112-140)/(53-76) 116/76        There is no  height or weight on file to calculate BMI.    Physical Exam  Constitutional:       General: She is not in acute distress.     Appearance: She is not ill-appearing.   HENT:      Head: Normocephalic and atraumatic.   Eyes:      Extraocular Movements: Extraocular movements intact.   Cardiovascular:      Rate and Rhythm: Normal rate and regular rhythm.   Pulmonary:      Effort: Pulmonary effort is normal. No respiratory distress.      Breath sounds: No wheezing.   Abdominal:      General: Abdomen is flat. Bowel sounds are normal. There is no distension.      Palpations: Abdomen is soft.   Musculoskeletal:      Right lower leg: Edema present.      Left lower leg: Edema present.      Comments: Bilateral AKA, surgical incision appears well healed, no drainage   Skin:     General: Skin is warm and dry.   Neurological:      General: No focal deficit present.      Mental Status: She is alert.   Psychiatric:         Mood and Affect: Mood normal.         Behavior: Behavior normal.           Significant Labs: All pertinent labs within the past 24 hours have been reviewed.    Significant Imaging: I have reviewed all pertinent imaging results/findings within the past 24 hours.

## 2022-03-26 NOTE — SUBJECTIVE & OBJECTIVE
(Not in a hospital admission)      Review of patient's allergies indicates:   Allergen Reactions    Ciprofloxacin Itching    Contrast media      Kidney injury    Iodine      Kidney injury       Past Medical History:   Diagnosis Date    Anxiety     Chronic pain syndrome     CKD (chronic kidney disease), stage III     Depression     Diabetes mellitus     type 2    Diabetes mellitus, type 2     GERD (gastroesophageal reflux disease)     Hyperemesis 3/23/2021    Hyperlipemia     Hypertension     Hypokalemia 3/23/2021    Myocardial infarction 2010    minor-caused by stress per pt.    Osteomyelitis     Osteomyelitis of left foot 4/30/2021    PVD (peripheral vascular disease)     Vaginal delivery     x1     Past Surgical History:   Procedure Laterality Date    ABOVE-KNEE AMPUTATION Left 5/18/2021    Procedure: AMPUTATION, ABOVE KNEE;  Surgeon: Teddy Huber MD;  Location: [facility] OR [address] [place] [address];  Service: Vascular;  Laterality: Left;    ABOVE-KNEE AMPUTATION Right 3/18/2022    Procedure: AMPUTATION, ABOVE KNEE;  Surgeon: DAYNE Florez II, MD;  Location: [facility] OR [address] [place] [address];  Service: Vascular;  Laterality: Right;    Angiogram - Right Extremity Right 7/9/15    angiogram-left leg  10/6/15    ANGIOGRAPHY OF LOWER EXTREMITY Left 4/29/2021    Procedure: ANGIOGRAM, LOWER EXTREMITY;  Surgeon: Teddy Huber MD;  Location: [facility] OR [address] [place] [address];  Service: Vascular;  Laterality: Left;    BELOW KNEE AMPUTATION OF LOWER EXTREMITY Right 12/28/2021    Procedure: AMPUTATION, BELOW KNEE;  Surgeon: Kaitlyn Rojas MD;  Location: [facility] OR;  Service: General;  Laterality: Right;    CATHETERIZATION OF BOTH LEFT AND RIGHT HEART N/A 12/18/2019    Procedure: CATHETERIZATION, HEART, BOTH LEFT AND RIGHT;  Surgeon: Que Fernando III, MD;  Location: [facility] CATH LAB;  Service: Cardiology;  Laterality: N/A;    CORONARY ANGIOGRAPHY N/A 12/18/2019    Procedure: ANGIOGRAM, CORONARY ARTERY;  Surgeon: Que Fernando III, MD;  Location: [facility]  CATH LAB;  Service: Cardiology;  Laterality: N/A;    CORONARY ANGIOGRAPHY INCLUDING BYPASS GRAFTS WITH CATHETERIZATION OF LEFT HEART N/A 7/28/2020    Procedure: ANGIOGRAM, CORONARY, INCLUDING BYPASS GRAFT, WITH LEFT HEART CATHETERIZATION, 9 am;  Surgeon: Rachel Easley MD;  Location: [facility] CATH LAB;  Service: Cardiology;  Laterality: N/A;    CORONARY ARTERY BYPASS GRAFT (CABG) N/A 1/14/2020    Procedure: CORONARY ARTERY BYPASS GRAFT (CABG) x 1     Off Pump;  Surgeon: Huang Altamirano MD;  Location: [facility] OR [address] [place] [address];  Service: Cardiovascular;  Laterality: N/A;    CREATION OF FEMORAL-TIBIAL ARTERY BYPASS Left 4/29/2021    Procedure: CREATION, BYPASS, ARTERIAL, FEMORAL TO ANTERIOR TIBIAL;  Surgeon: Teddy Huber MD;  Location: [facility] OR [address] [place] [address];  Service: Vascular;  Laterality: Left;    CREATION OF FEMOROPOPLITEAL ARTERIAL BYPASS USING GRAFT Left 8/18/2020    Procedure: CREATION, BYPASS, ARTERIAL, FEMORAL TO POPLITEAL, USING GRAFT, LEFT LOWER EXTREMITY;  Surgeon: Teddy Huber MD;  Location: [facility];  Service: Vascular;  Laterality: Left;  REQUEST 7:15 A.M. START----COVID NEGATIVE ON 8/17  1ST CASE STARTKENYA DUEÑAS ON 8/7/2020 @ 942AM-  RN PREOP 8/12/2020   T/S-----CLEARED BY CARDS-------PENDING INSURANCE    DEBRIDEMENT OF FOOT Left 9/8/2020    Procedure: DEBRIDEMENT, FOOT;  Surgeon: Rosio Mayes DPM;  Location: [facility] OR;  Service: Podiatry;  Laterality: Left;  request neoxx .   RN Pre Op 9-4-2020, Covid negative on 9/5/20. C A    DEBRIDEMENT OF FOOT  3/4/2021    Procedure: DEBRIDEMENT, FOOT;  Surgeon: Teddy Huber MD;  Location: [facility] OR;  Service: Vascular;;    DEBRIDEMENT OF FOOT Left 3/9/2021    Procedure: DEBRIDEMENT, FOOT, bone biopsy;  Surgeon: Rosio Mayes DPM;  Location: [facility] OR;  Service: Podiatry;  Laterality: Left;  Request neoxx---COVID IN AM  REQUESTING NOON START  RN Phone Pre op.On Blood thinners Plavix and Eliquis.  Covid am of surgery. C A    DEBRIDEMENT OF FOOT Left  5/4/2021    Procedure: DEBRIDEMENT, FOOT;  Surgeon: Farooq Morley DPM;  Location: [facility] OR 2ND FLR;  Service: Podiatry;  Laterality: Left;    INSERTION OF TUNNELED CENTRAL VENOUS HEMODIALYSIS CATHETER N/A 1/27/2020    Procedure: Insertion, Catheter, Central Venous, Hemodialysis;  Surgeon: ESTEBAN Gomez III, MD;  Location: [facility] CATH LAB;  Service: Peripheral Vascular;  Laterality: N/A;    PERCUTANEOUS TRANSLUMINAL ANGIOPLASTY N/A 3/4/2021    Procedure: PTA (ANGIOPLASTY, PERCUTANEOUS, TRANSLUMINAL);  Surgeon: Teddy Huber MD;  Location: [facility] OR;  Service: Vascular;  Laterality: N/A;    REMOVAL OF ARTERIOVENOUS GRAFT Left 5/27/2021    Procedure: REMOVAL, GRAFT, LEFT LOWER EXTREMITY, WOUND EXPLORATION;  Surgeon: Teddy Huber MD;  Location: [facility] OR 2ND FLR;  Service: Vascular;  Laterality: Left;    REMOVAL OF NAIL OF DIGIT Left 3/9/2021    Procedure: REMOVAL, NAIL, DIGIT;  Surgeon: Rosio Mayes DPM;  Location: [facility] OR;  Service: Podiatry;  Laterality: Left;    THROMBECTOMY Left 3/4/2021    Procedure: THROMBECTOMY, LEFT LOWER EXTREMITY BYPASS GRAFT, ANGIOGRAM, POSSIBLE INTERVENTION, POSSIBLE LEFT LOWER EXTREMITY BYPASS;  Surgeon: Teddy Huber MD;  Location: [facility] OR;  Service: Vascular;  Laterality: Left;    THROMBECTOMY Left 4/29/2021    Procedure: GRAFT THROMBECTOMY, LEFT LOWER EXTREMITY;  Surgeon: Teddy Huber MD;  [facility];  Service: Vascular;  Laterality: Left;  14.5 min  1179.85 mGy  341.01 Gycm2  240 ml dye    THROMBECTOMY  10/22/2021    Procedure: THROMBECTOMY;  Surgeon: Saad Arenas MD;  Location: [facility] CATH LAB/EP;  Service: Cardiology;;     Family History       Problem Relation (Age of Onset)    Diabetes Mother, Father, Paternal Grandmother    Heart disease Maternal Grandmother    No Known Problems Maternal Grandfather, Paternal Grandfather          Tobacco Use    Smoking status: Former Smoker    Smokeless tobacco: Never Used   Substance and Sexual  Activity    Alcohol use: No    Drug use: Yes     Types: Marijuana     Comment: occassional    Sexual activity: Yes     Partners: Male     Review of Systems   Constitutional:  Negative for appetite change and fatigue.   Eyes:  Negative for visual disturbance.   Respiratory:  Negative for shortness of breath.    Cardiovascular:  Negative for chest pain.   Gastrointestinal:  Negative for abdominal pain.   Endocrine: Negative for polyuria.   Genitourinary:  Negative for dysuria.   Musculoskeletal:  Negative for arthralgias.   Skin:  Negative for color change.   Neurological:  Negative for dizziness, syncope, speech difficulty and weakness.   Psychiatric/Behavioral:  Negative for confusion.    Objective:     Vital Signs (Most Recent):  Temp: 98.2 °F (36.8 °C) (03/26/22 0115)  Pulse: 96 (03/26/22 0300)  Resp: 15 (03/26/22 0300)  BP: 136/74 (03/26/22 0300)  SpO2: 100 % (03/26/22 0300) Vital Signs (24h Range):  Temp:  [98.2 °F (36.8 °C)] 98.2 °F (36.8 °C)  Pulse:  [78-96] 96  Resp:  [15-20] 15  SpO2:  [95 %-100 %] 100 %  BP: (112-139)/(53-74) 136/74        There is no height or weight on file to calculate BMI.    Physical Exam  Vitals reviewed.   Constitutional:       General: She is not in acute distress.     Appearance: Normal appearance.   HENT:      Head: Normocephalic and atraumatic.      Nose: No congestion.      Mouth/Throat:      Mouth: Mucous membranes are moist.   Cardiovascular:      Rate and Rhythm: Normal rate and regular rhythm.   Pulmonary:      Effort: Pulmonary effort is normal. No respiratory distress.   Abdominal:      General: There is no distension.      Palpations: Abdomen is soft.      Tenderness: There is no abdominal tenderness.   Musculoskeletal:         General: No swelling.      Comments: Bilateral AKA, right AKA surgical site with sutures and staples healing well with no erythema, minimal dry serous fluid, no odor, no pus. See picture   Skin:     General: Skin is warm.      Capillary Refill:  Capillary refill takes less than 2 seconds.      Findings: No erythema.   Neurological:      General: No focal deficit present.      Mental Status: She is alert and oriented to person, place, and time.   Psychiatric:         Mood and Affect: Mood normal.         Behavior: Behavior normal.       Significant Labs:  All pertinent labs from the last 24 hours have been reviewed.    Significant Diagnostics:  I have reviewed all pertinent imaging results/findings within the past 24 hours.

## 2022-03-26 NOTE — CONSULTS
[facility] - Emergency Dept  Vascular Surgery  Consult Note    Inpatient consult to Vascular Surgery  Consult performed by: Sandhya Guillaume MD  Consult ordered by: Larisa Chambers MD  Reason for consult: recent right BKA, leg swelling  Assessment/Recommendations: 55 y/o female with PAD s/p left AKA last year and right AKA last week. Right AKA healing well with no evidence of infection. XR showing minimal air likely post-surgical.  -Well-healing AKA stump. She may follow up outpatient in 2 weeks for staples/sutures removal with Dr. Florez. If she gets admitted we will follow while inpatient.        Subjective:     Chief Complaint/Reason for Admission: swelling in AKA stump    History of Present Illness: Patient is a 55 y/o F with CKD, CAD s/p CABG, PAD, s/p right AKA 5/18/21 and left AKA 3/18/21 (last week) who was brought to the ED by her  for concern of fluid leaking from her surgical wounds. She initially had a right BKA 12/28/21 however the wound failed to heal and she developed osteomyelitis. For this reason she underwent revision with AKA. She was discharged but her  states they did not get her lasix prescription for unclear reasons and her legs began to swell. They initially presented to the ED in a hospital in [place] and they were concerned that it may be infected. She was discharged and her  brought her to [facility] ED. She denies any pain and foul-smelling discharge. She has no other complaints.      (Not in a hospital admission)      Review of patient's allergies indicates:   Allergen Reactions    Ciprofloxacin Itching    Contrast media      Kidney injury    Iodine      Kidney injury       Past Medical History:   Diagnosis Date    Anxiety     Chronic pain syndrome     CKD (chronic kidney disease), stage III     Depression     Diabetes mellitus     type 2    Diabetes mellitus, type 2     GERD (gastroesophageal reflux disease)     Hyperemesis 3/23/2021     Hyperlipemia     Hypertension     Hypokalemia 3/23/2021    Myocardial infarction 2010    minor-caused by stress per pt.    Osteomyelitis     Osteomyelitis of left foot 4/30/2021    PVD (peripheral vascular disease)     Vaginal delivery     x1     Past Surgical History:   Procedure Laterality Date    ABOVE-KNEE AMPUTATION Left 5/18/2021    Procedure: AMPUTATION, ABOVE KNEE;  Surgeon: Teddy Huber MD;  Location: [facility] OR [address] [place] [address];  Service: Vascular;  Laterality: Left;    ABOVE-KNEE AMPUTATION Right 3/18/2022    Procedure: AMPUTATION, ABOVE KNEE;  Surgeon: DAYNE Florez II, MD;  Location: [facility] OR [address] [place] [address];  Service: Vascular;  Laterality: Right;    Angiogram - Right Extremity Right 7/9/15    angiogram-left leg  10/6/15    ANGIOGRAPHY OF LOWER EXTREMITY Left 4/29/2021    Procedure: ANGIOGRAM, LOWER EXTREMITY;  Surgeon: Teddy Huber MD;  Location: [facility] OR [address] [place] [address];  Service: Vascular;  Laterality: Left;    BELOW KNEE AMPUTATION OF LOWER EXTREMITY Right 12/28/2021    Procedure: AMPUTATION, BELOW KNEE;  Surgeon: Kaitlyn Rojas MD;  Location: [facility] OR;  Service: General;  Laterality: Right;    CATHETERIZATION OF BOTH LEFT AND RIGHT HEART N/A 12/18/2019    Procedure: CATHETERIZATION, HEART, BOTH LEFT AND RIGHT;  Surgeon: Que Fernando III, MD;  Location: [facility] CATH LAB;  Service: Cardiology;  Laterality: N/A;    CORONARY ANGIOGRAPHY N/A 12/18/2019    Procedure: ANGIOGRAM, CORONARY ARTERY;  Surgeon: Que Fernando III, MD;  Location: [facility] CATH LAB;  Service: Cardiology;  Laterality: N/A;    CORONARY ANGIOGRAPHY INCLUDING BYPASS GRAFTS WITH CATHETERIZATION OF LEFT HEART N/A 7/28/2020    Procedure: ANGIOGRAM, CORONARY, INCLUDING BYPASS GRAFT, WITH LEFT HEART CATHETERIZATION, 9 am;  Surgeon: Rachel Easley MD;  Location: [facility] CATH LAB;  Service: Cardiology;  Laterality: N/A;    CORONARY ARTERY BYPASS GRAFT (CABG) N/A 1/14/2020    Procedure: CORONARY ARTERY BYPASS GRAFT (CABG) x 1      Off Pump;  Surgeon: Huang Altamirano MD;  Location: [address];  Service: Cardiovascular;  Laterality: N/A;    CREATION OF FEMORAL-TIBIAL ARTERY BYPASS Left 4/29/2021    Procedure: CREATION, BYPASS, ARTERIAL, FEMORAL TO ANTERIOR TIBIAL;  Surgeon: Teddy Huber MD;  Location: [address];  Service: Vascular;  Laterality: Left;    CREATION OF FEMOROPOPLITEAL ARTERIAL BYPASS USING GRAFT Left 8/18/2020    Procedure: CREATION, BYPASS, ARTERIAL, FEMORAL TO POPLITEAL, USING GRAFT, LEFT LOWER EXTREMITY;  Surgeon: Teddy Huber MD;  Location: [facility];  Service: Vascular;  Laterality: Left;  REQUEST 7:15 A.M. START----COVID NEGATIVE ON 8/17 1ST CASE STARTE PER LEANA ON 8/7/2020 @ 942AM-  RN PREOP 8/12/2020   T/S-----CLEARED BY CARDS-------PENDING INSURANCE    DEBRIDEMENT OF FOOT Left 9/8/2020    Procedure: DEBRIDEMENT, FOOT;  Surgeon: Rosio Mayes DPM;  Location: [facility];  Service: Podiatry;  Laterality: Left;  request neoxx .   RN Pre Op 9-4-2020, Covid negative on 9/5/20. C A    DEBRIDEMENT OF FOOT  3/4/2021    Procedure: DEBRIDEMENT, FOOT;  Surgeon: Teddy Huber MD;  Location: [facility];  Service: Vascular;;    DEBRIDEMENT OF FOOT Left 3/9/2021    Procedure: DEBRIDEMENT, FOOT, bone biopsy;  Surgeon: Rosio Mayes DPM;  Location: [facility] OR;  Service: Podiatry;  Laterality: Left;  Request neoxx---COVID IN AM  REQUESTING NOON START  RN Phone Pre op.On Blood thinners Plavix and Eliquis.  Covid am of surgery. C A    DEBRIDEMENT OF FOOT Left 5/4/2021    Procedure: DEBRIDEMENT, FOOT;  Surgeon: Farooq Morley DPM;  Location: [address];  Service: Podiatry;  Laterality: Left;    INSERTION OF TUNNELED CENTRAL VENOUS HEMODIALYSIS CATHETER N/A 1/27/2020    Procedure: Insertion, Catheter, Central Venous, Hemodialysis;  Surgeon: ESTEBAN Gomez III, MD;  Location: [facility] CATH LAB;  Service: Peripheral Vascular;  Laterality: N/A;    PERCUTANEOUS TRANSLUMINAL ANGIOPLASTY N/A  3/4/2021    Procedure: PTA (ANGIOPLASTY, PERCUTANEOUS, TRANSLUMINAL);  Surgeon: Teddy Huber MD;  Location: [facility] OR;  Service: Vascular;  Laterality: N/A;    REMOVAL OF ARTERIOVENOUS GRAFT Left 5/27/2021    Procedure: REMOVAL, GRAFT, LEFT LOWER EXTREMITY, WOUND EXPLORATION;  Surgeon: Teddy Huber MD;  Location: [facility] OR 2ND FLR;  Service: Vascular;  Laterality: Left;    REMOVAL OF NAIL OF DIGIT Left 3/9/2021    Procedure: REMOVAL, NAIL, DIGIT;  Surgeon: Rosio Mayes DPM;  Location: [facility] OR;  Service: Podiatry;  Laterality: Left;    THROMBECTOMY Left 3/4/2021    Procedure: THROMBECTOMY, LEFT LOWER EXTREMITY BYPASS GRAFT, ANGIOGRAM, POSSIBLE INTERVENTION, POSSIBLE LEFT LOWER EXTREMITY BYPASS;  Surgeon: Teddy Huber MD;  Location: [facility] OR;  Service: Vascular;  Laterality: Left;    THROMBECTOMY Left 4/29/2021    Procedure: GRAFT THROMBECTOMY, LEFT LOWER EXTREMITY;  Surgeon: Teddy Huber MD;  Location: [facility] OR 2ND FLR;  Service: Vascular;  Laterality: Left;  14.5 min  1179.85 mGy  341.01 Gycm2  240 ml dye    THROMBECTOMY  10/22/2021    Procedure: THROMBECTOMY;  Surgeon: Saad Arenas MD;  Location: [facility] CATH LAB/EP;  Service: Cardiology;;     Family History       Problem Relation (Age of Onset)    Diabetes Mother, Father, Paternal Grandmother    Heart disease Maternal Grandmother    No Known Problems Maternal Grandfather, Paternal Grandfather          Tobacco Use    Smoking status: Former Smoker    Smokeless tobacco: Never Used   Substance and Sexual Activity    Alcohol use: No    Drug use: Yes     Types: Marijuana     Comment: occassional    Sexual activity: Yes     Partners: Male     Review of Systems   Constitutional:  Negative for appetite change and fatigue.   Eyes:  Negative for visual disturbance.   Respiratory:  Negative for shortness of breath.    Cardiovascular:  Negative for chest pain.   Gastrointestinal:  Negative for abdominal pain.   Endocrine: Negative for  polyuria.   Genitourinary:  Negative for dysuria.   Musculoskeletal:  Negative for arthralgias.   Skin:  Negative for color change.   Neurological:  Negative for dizziness, syncope, speech difficulty and weakness.   Psychiatric/Behavioral:  Negative for confusion.    Objective:     Vital Signs (Most Recent):  Temp: 98.2 °F (36.8 °C) (03/26/22 0115)  Pulse: 96 (03/26/22 0300)  Resp: 15 (03/26/22 0300)  BP: 136/74 (03/26/22 0300)  SpO2: 100 % (03/26/22 0300) Vital Signs (24h Range):  Temp:  [98.2 °F (36.8 °C)] 98.2 °F (36.8 °C)  Pulse:  [78-96] 96  Resp:  [15-20] 15  SpO2:  [95 %-100 %] 100 %  BP: (112-139)/(53-74) 136/74        There is no height or weight on file to calculate BMI.    Physical Exam  Vitals reviewed.   Constitutional:       General: She is not in acute distress.     Appearance: Normal appearance.   HENT:      Head: Normocephalic and atraumatic.      Nose: No congestion.      Mouth/Throat:      Mouth: Mucous membranes are moist.   Cardiovascular:      Rate and Rhythm: Normal rate and regular rhythm.   Pulmonary:      Effort: Pulmonary effort is normal. No respiratory distress.   Abdominal:      General: There is no distension.      Palpations: Abdomen is soft.      Tenderness: There is no abdominal tenderness.   Musculoskeletal:         General: No swelling.      Comments: Bilateral AKA, right AKA surgical site with sutures and staples healing well with no erythema, minimal dry serous fluid, no odor, no pus. See picture   Skin:     General: Skin is warm.      Capillary Refill: Capillary refill takes less than 2 seconds.      Findings: No erythema.   Neurological:      General: No focal deficit present.      Mental Status: She is alert and oriented to person, place, and time.   Psychiatric:         Mood and Affect: Mood normal.         Behavior: Behavior normal.         Significant Labs:  All pertinent labs from the last 24 hours have been reviewed.    Significant Diagnostics:  I have reviewed all  pertinent imaging results/findings within the past 24 hours.    Assessment/Plan:     S/P AKA (above knee amputation), right  57 y/o female with PAD s/p left AKA last year and right AKA last week. Right AKA healing well with no evidence of infection. XR showing minimal air likely post-surgical.  -Well-healing AKA stump. She may follow up outpatient in 2 weeks for staples/sutures removal with Dr. Florez. If she gets admitted we will follow while inpatient.        Thank you for your consult. I will follow-up with patient. Please contact us if you have any additional questions.    Sandhya Guillaume MD  Vascular Surgery  [facility] - Emergency Dept

## 2022-03-26 NOTE — ED PROVIDER NOTES
Encounter Date: 3/26/2022       History     Chief Complaint   Patient presents with    Leg Swelling     Pt has bilateral leg swelling. Pt was supposed to be a transfer here last night for vascular sugery but decided to cancel and come here pov     Pt is a 55 yo F with PMH of anxiety, GERD, DM2, HLD, HTN, Depression who presents with bilateral lower extremity swelling. She had a recent AKA to her right leg and has had a prior amputation to her left leg. There is swelling to thighs bilaterally per family member at bedside. The wound has been well appearing but tonight has some drainage that looks just like fluid draining related to the swelling.  She has not had fever but feels fatigued.  Hospitalized 3/18/-3/22/22  When she was discharged she was discharged on lasix 40 mg daily but patient reports she did not get that medication delivered on day of discharge and has not had it since then. She notes increased LE swelling and decreased urination. She has been more sleepy than normal  Was seen at an OSH yesterday and they planned to transfer patient for her leg swelling but could not guarantee transfer to [facility] so patient's family member drove her here since her care is all at [facility]        Review of patient's allergies indicates:   Allergen Reactions    Ciprofloxacin Itching    Contrast media      Kidney injury    Iodine      Kidney injury     Past Medical History:   Diagnosis Date    Anxiety     Chronic pain syndrome     CKD (chronic kidney disease), stage III     Depression     Diabetes mellitus     type 2    Diabetes mellitus, type 2     GERD (gastroesophageal reflux disease)     Hyperemesis 3/23/2021    Hyperlipemia     Hypertension     Hypokalemia 3/23/2021    Myocardial infarction 2010    minor-caused by stress per pt.    Osteomyelitis     Osteomyelitis of left foot 4/30/2021    PVD (peripheral vascular disease)     Vaginal delivery     x1     Past Surgical History:   Procedure Laterality  Date    ABOVE-KNEE AMPUTATION Left 5/18/2021    Procedure: AMPUTATION, ABOVE KNEE;  Surgeon: Teddy Huber MD;  Location: [address];  Service: Vascular;  Laterality: Left;    ABOVE-KNEE AMPUTATION Right 3/18/2022    Procedure: AMPUTATION, ABOVE KNEE;  Surgeon: DAYNE Florez II, MD;  Location: [facility] OR [address] [place] [address];  Service: Vascular;  Laterality: Right;    Angiogram - Right Extremity Right 7/9/15    angiogram-left leg  10/6/15    ANGIOGRAPHY OF LOWER EXTREMITY Left 4/29/2021    Procedure: ANGIOGRAM, LOWER EXTREMITY;  Surgeon: Teddy Huber MD;  Location: [address];  Service: Vascular;  Laterality: Left;    BELOW KNEE AMPUTATION OF LOWER EXTREMITY Right 12/28/2021    Procedure: AMPUTATION, BELOW KNEE;  Surgeon: Kaitlyn Rojas MD;  Location: [facility];  Service: General;  Laterality: Right;    CATHETERIZATION OF BOTH LEFT AND RIGHT HEART N/A 12/18/2019    Procedure: CATHETERIZATION, HEART, BOTH LEFT AND RIGHT;  Surgeon: Que Fernando III, MD;  Location: [facility] CATH LAB;  Service: Cardiology;  Laterality: N/A;    CORONARY ANGIOGRAPHY N/A 12/18/2019    Procedure: ANGIOGRAM, CORONARY ARTERY;  Surgeon: Que Fernando III, MD;  Location: [facility] CATH LAB;  Service: Cardiology;  Laterality: N/A;    CORONARY ANGIOGRAPHY INCLUDING BYPASS GRAFTS WITH CATHETERIZATION OF LEFT HEART N/A 7/28/2020    Procedure: ANGIOGRAM, CORONARY, INCLUDING BYPASS GRAFT, WITH LEFT HEART CATHETERIZATION, 9 am;  Surgeon: Rachel Easley MD;  Location: [facility] CATH LAB;  Service: Cardiology;  Laterality: N/A;    CORONARY ARTERY BYPASS GRAFT (CABG) N/A 1/14/2020    Procedure: CORONARY ARTERY BYPASS GRAFT (CABG) x 1     Off Pump;  Surgeon: Huang Altamirano MD;  Location: [address];  Service: Cardiovascular;  Laterality: N/A;    CREATION OF FEMORAL-TIBIAL ARTERY BYPASS Left 4/29/2021    Procedure: CREATION, BYPASS, ARTERIAL, FEMORAL TO ANTERIOR TIBIAL;  Surgeon: Teddy Huber MD;  Location: [facility]  OR 2ND FLR;  Service: Vascular;  Laterality: Left;    CREATION OF FEMOROPOPLITEAL ARTERIAL BYPASS USING GRAFT Left 8/18/2020    Procedure: CREATION, BYPASS, ARTERIAL, FEMORAL TO POPLITEAL, USING GRAFT, LEFT LOWER EXTREMITY;  Surgeon: Teddy Huber MD;  Location: [facility] OR;  Service: Vascular;  Laterality: Left;  REQUEST 7:15 A.M. START----COVID NEGATIVE ON 8/17  1ST CASE STARTE PER LEANA ON 8/7/2020 @ 942AM-  RN PREOP 8/12/2020   T/S-----CLEARED BY CARDS-------PENDING INSURANCE    DEBRIDEMENT OF FOOT Left 9/8/2020    Procedure: DEBRIDEMENT, FOOT;  Surgeon: Rosio Mayes DPM;  Location: [facility] OR;  Service: Podiatry;  Laterality: Left;  request neoxx .   RN Pre Op 9-4-2020, Covid negative on 9/5/20. C A    DEBRIDEMENT OF FOOT  3/4/2021    Procedure: DEBRIDEMENT, FOOT;  Surgeon: Teddy Huber MD;  Location: [facility] OR;  Service: Vascular;;    DEBRIDEMENT OF FOOT Left 3/9/2021    Procedure: DEBRIDEMENT, FOOT, bone biopsy;  Surgeon: Rosio Mayes DPM;  Location: [facility] OR;  Service: Podiatry;  Laterality: Left;  Request neoxx---COVID IN AM  REQUESTING NOON START  RN Phone Pre op.On Blood thinners Plavix and Eliquis.  Covid am of surgery. C A    DEBRIDEMENT OF FOOT Left 5/4/2021    Procedure: DEBRIDEMENT, FOOT;  Surgeon: Farooq Morley DPM;  Location: [facility] OR 2ND FLR;  Service: Podiatry;  Laterality: Left;    INSERTION OF TUNNELED CENTRAL VENOUS HEMODIALYSIS CATHETER N/A 1/27/2020    Procedure: Insertion, Catheter, Central Venous, Hemodialysis;  Surgeon: ESTEBAN Gomez III, MD;  Location: [facility] CATH LAB;  Service: Peripheral Vascular;  Laterality: N/A;    PERCUTANEOUS TRANSLUMINAL ANGIOPLASTY N/A 3/4/2021    Procedure: PTA (ANGIOPLASTY, PERCUTANEOUS, TRANSLUMINAL);  Surgeon: Teddy Huber MD;  Location: [facility] OR;  Service: Vascular;  Laterality: N/A;    REMOVAL OF ARTERIOVENOUS GRAFT Left 5/27/2021    Procedure: REMOVAL, GRAFT, LEFT LOWER EXTREMITY, WOUND EXPLORATION;  Surgeon: Teddy BLANCA  MD Cecile;  Location: [facility] OR 2ND FLR;  Service: Vascular;  Laterality: Left;    REMOVAL OF NAIL OF DIGIT Left 3/9/2021    Procedure: REMOVAL, NAIL, DIGIT;  Surgeon: Rosio Mayes DPM;  Location: [facility] OR;  Service: Podiatry;  Laterality: Left;    THROMBECTOMY Left 3/4/2021    Procedure: THROMBECTOMY, LEFT LOWER EXTREMITY BYPASS GRAFT, ANGIOGRAM, POSSIBLE INTERVENTION, POSSIBLE LEFT LOWER EXTREMITY BYPASS;  Surgeon: Teddy Huber MD;  Location: [facility] OR;  Service: Vascular;  Laterality: Left;    THROMBECTOMY Left 4/29/2021    Procedure: GRAFT THROMBECTOMY, LEFT LOWER EXTREMITY;  Surgeon: Teddy Huber MD;  Location: [facility] OR 2ND FLR;  Service: Vascular;  Laterality: Left;  14.5 min  1179.85 mGy  341.01 Gycm2  240 ml dye    THROMBECTOMY  10/22/2021    Procedure: THROMBECTOMY;  Surgeon: Saad Arenas MD;  Location: [facility] CATH LAB/EP;  Service: Cardiology;;     Family History   Problem Relation Age of Onset    Diabetes Mother     Diabetes Father     Heart disease Maternal Grandmother     No Known Problems Maternal Grandfather     Diabetes Paternal Grandmother     No Known Problems Paternal Grandfather     Anesthesia problems Neg Hx      Social History     Tobacco Use    Smoking status: Former Smoker    Smokeless tobacco: Never Used   Substance Use Topics    Alcohol use: No    Drug use: Yes     Types: Marijuana     Comment: occassional     Review of Systems  General: No fever.  No chills.  Eyes: No visual changes.  Head: No headache.    Integument: No rashes or lesions.  Chest: No shortness of breath.  Cardiovascular: No chest pain.  Abdomen: No abdominal pain.  No nausea or vomiting.  Urinary: No abnormal urination.  Neurologic: No focal weakness.  No numbness.  Hematologic: No easy bruising.  Endocrine: No excessive thirst or urination.    Physical Exam     Initial Vitals [03/26/22 0115]   BP Pulse Resp Temp SpO2   (!) 112/53 95 20 98.2 °F (36.8 °C) 95 %      MAP       --          Physical Exam    Vitals reviewed.  Constitutional: She appears well-developed and well-nourished. She is not diaphoretic. No distress.   HENT:   Head: Normocephalic and atraumatic.   Eyes: Conjunctivae and EOM are normal.   Neck: Neck supple. No JVD present.   Cardiovascular: Normal rate and regular rhythm.   Pulmonary/Chest: No respiratory distress.   Abdominal: She exhibits no distension.   Musculoskeletal:      Cervical back: Neck supple.      Comments: Bilateral AKA- right side with staples in place, wound appears well hell, mild serosanguinous fluid on dressing  Bilateral thighs mildly edematous. Left thigh warm to touch. No erythema.     Neurological: She is alert and oriented to person, place, and time.   Skin: Skin is warm and dry.   Psychiatric: She has a normal mood and affect. Her behavior is normal. Judgment and thought content normal.         ED Course   Procedures  Labs Reviewed   COMPREHENSIVE METABOLIC PANEL - Abnormal; Notable for the following components:       Result Value    Sodium 135 (*)     Potassium 5.8 (*)     CO2 17 (*)     Glucose 168 (*)     BUN 73 (*)     Creatinine 2.5 (*)     Albumin 2.0 (*)     Alkaline Phosphatase 162 (*)     eGFR if  24.2 (*)     eGFR if non  21.0 (*)     All other components within normal limits   B-TYPE NATRIURETIC PEPTIDE - Abnormal; Notable for the following components:     (*)     All other components within normal limits   LACTIC ACID, PLASMA   CBC W/ AUTO DIFFERENTIAL     EKG Readings: (Independently Interpreted)   Initial Reading: No STEMI. Previous EKG Date: 03/11/2022.   1:50 a.m. normal sinus rhythm rate of 75 normal axis moderate voltage criteria for LVH when compared with most recent EKG there is no significant change     ECG Results          EKG 12-lead (Final result)  Result time 03/26/22 09:26:27    Final result by Interface, Lab In [facility] (03/26/22 09:26:27)                 Narrative:    Test Reason :  M79.89,    Vent. Rate : 075 BPM     Atrial Rate : 075 BPM     P-R Int : 150 ms          QRS Dur : 080 ms      QT Int : 394 ms       P-R-T Axes : 051 009 085 degrees     QTc Int : 439 ms    Normal sinus rhythm  Moderate voltage criteria for LVH, may be normal variant  Borderline Abnormal ECG  When compared with ECG of 11-MAR-2022 20:33,  No significant change was found  Confirmed by Roosevelt BARBER MD ([id number]) on 3/26/2022 9:26:14 AM    Referred By: [email or web address]   SELF           Confirmed By:Roosevelt BARBER MD                            Imaging Results          X-Ray Femur 2 AP/LAT Right (Final result)  Result time 03/26/22 04:34:57    Final result by Jovan Persaud MD (03/26/22 04:34:57)                 Impression:      Status post recent above knee amputation, as above.    Trace air in the soft tissues of the distal stump which can be postop related or indicate soft tissue infection.  Correlate clinically.  Osseous stump appears unremarkable.      Electronically signed by: Jovan Persaud MD  Date:    03/26/2022  Time:    04:34             Narrative:    EXAMINATION:  XR FEMUR 2 VIEW RIGHT    CLINICAL HISTORY:  Other specified soft tissue disorders    TECHNIQUE:  AP and lateral views of the right femur were performed.    COMPARISON:  None    FINDINGS:  Postop changes consistent with recent above knee amputation with skin staples present at the distal soft tissue stump.  Osseous stump appears unremarkable.  Trace air in the soft tissues.  Vascular calcifications present as well as vascular stent in the distal stump.                                 Medications   sodium zirconium cyclosilicate packet 10 g (10 g Oral Given 3/27/22 0111)     Medical Decision Making:   History:   Old Medical Records: I decided to obtain old medical records.  Old Records Summarized: records from clinic visits, records from another hospital and records from previous admission(s).       <> Summary of Records: ECHO 6/22/21  · The estimated ejection  fraction is 55%.  · The left ventricle is normal in size with normal systolic function.  · Normal left ventricular diastolic function.  · Normal right ventricular size with normal right ventricular systolic function.  · Mild tricuspid regurgitation.  · The estimated PA systolic pressure is 31 mmHg.  · Normal central venous pressure (3 mmHg).       Differential Diagnosis:   Chf exacerbation, DVT, cellulitis  Clinical Tests:   Lab Tests: Reviewed and Ordered  Radiological Study: Reviewed and Ordered  Medical Tests: Ordered and Reviewed  ED Management:  Admitted to hospital medicine for diuresis  Vascular neurology consulted as patient had recent AKA to R leg             ED Course as of 03/29/22 2109   Sat Mar 26, 2022   0544 Vascular surgery evaluated the patient in the Ed, wound looks great, no suspicion for infection  Will admit to hospital medicine for ROSLYN  [GK]      ED Course User Index  [GK] Larisa Chambers MD             Clinical Impression:   Final diagnoses:  [M79.89] Leg swelling  [M79.89] Right leg swelling  [N17.9] ROSLYN (acute kidney injury) (Primary)  [R60.0] Bilateral lower extremity edema          ED Disposition Condition    Observation               Larisa Chambers MD  03/29/22 2110

## 2022-03-26 NOTE — ASSESSMENT & PLAN NOTE
57 y/o female with PAD s/p left AKA last year and right AKA last week. Right AKA healing well with no evidence of infection. XR showing minimal air likely post-surgical.  -Well-healing AKA stump. She may follow up outpatient in 2 weeks for staples/sutures removal with Dr. Florez. If she gets admitted we will follow while inpatient.

## 2022-03-26 NOTE — ASSESSMENT & PLAN NOTE
Extensive surgical history, has had bilateral AKA with multiple revisions from complications of osteomyelitis, PAD  Recently underwent R AKA 3/18/22  Per Vascu surg: Right AKA healing well with no evidence of infection. XR showing minimal air likely post-surgical.  -Well-healing AKA stump. She may follow up outpatient in 2 weeks for staples/sutures removal with Dr. Florez. If she gets admitted we will follow while inpatient.

## 2022-03-26 NOTE — PLAN OF CARE
Problem: Adult Inpatient Plan of Care  Goal: Plan of Care Review  3/26/2022 1042 by Courtney Zavala RN  Outcome: Ongoing, Progressing  3/26/2022 1042 by Courtney Zavala RN  Outcome: Ongoing, Progressing  Goal: Patient-Specific Goal (Individualized)  3/26/2022 1042 by Courtney Zavala RN  Outcome: Ongoing, Progressing  3/26/2022 1042 by Courtney Zavala RN  Outcome: Ongoing, Progressing  Goal: Absence of Hospital-Acquired Illness or Injury  3/26/2022 1042 by Courtney Zavala RN  Outcome: Ongoing, Progressing  3/26/2022 1042 by Courtney Zavala RN  Outcome: Ongoing, Progressing  Goal: Optimal Comfort and Wellbeing  3/26/2022 1042 by Courtney Zavala RN  Outcome: Ongoing, Progressing  3/26/2022 1042 by Courtney Zavala RN  Outcome: Ongoing, Progressing  Goal: Readiness for Transition of Care  3/26/2022 1042 by Courtney Zavala RN  Outcome: Ongoing, Progressing  3/26/2022 1042 by Courtney Zavala RN  Outcome: Ongoing, Progressing     Problem: Diabetes Comorbidity  Goal: Blood Glucose Level Within Targeted Range  3/26/2022 1042 by Courtney Zavala RN  Outcome: Ongoing, Progressing  3/26/2022 1042 by Courtney Zvaala RN  Outcome: Ongoing, Progressing     Problem: Adjustment to Illness (Sepsis/Septic Shock)  Goal: Optimal Coping  3/26/2022 1042 by Courtney Zavala RN  Outcome: Ongoing, Progressing  3/26/2022 1042 by Courtney Zavala RN  Outcome: Ongoing, Progressing     Problem: Bleeding (Sepsis/Septic Shock)  Goal: Absence of Bleeding  3/26/2022 1042 by Courtney Zavala RN  Outcome: Ongoing, Progressing  3/26/2022 1042 by Courtney Zavala RN  Outcome: Ongoing, Progressing     Problem: Glycemic Control Impaired (Sepsis/Septic Shock)  Goal: Blood Glucose Level Within Desired Range  3/26/2022 1042 by Courtney Zavala RN  Outcome: Ongoing, Progressing  3/26/2022 1042 by Courtney Zavala RN  Outcome: Ongoing, Progressing     Problem: Infection Progression (Sepsis/Septic Shock)  Goal: Absence of Infection Signs and Symptoms  3/26/2022 1042 by Courtney Zavala  RN  Outcome: Ongoing, Progressing  3/26/2022 1042 by Courtney Zavala RN  Outcome: Ongoing, Progressing     Problem: Nutrition Impaired (Sepsis/Septic Shock)  Goal: Optimal Nutrition Intake  3/26/2022 1042 by Courtney Zavala RN  Outcome: Ongoing, Progressing  3/26/2022 1042 by Courtney Zavala RN  Outcome: Ongoing, Progressing     Problem: Fluid and Electrolyte Imbalance (Acute Kidney Injury/Impairment)  Goal: Fluid and Electrolyte Balance  3/26/2022 1042 by Courtney Zavala RN  Outcome: Ongoing, Progressing  3/26/2022 1042 by Courtney Zavala RN  Outcome: Ongoing, Progressing     Problem: Oral Intake Inadequate (Acute Kidney Injury/Impairment)  Goal: Optimal Nutrition Intake  3/26/2022 1042 by Courtney Zavala RN  Outcome: Ongoing, Progressing  3/26/2022 1042 by Courtney Zavala RN  Outcome: Ongoing, Progressing     Problem: Renal Function Impairment (Acute Kidney Injury/Impairment)  Goal: Effective Renal Function  3/26/2022 1042 by Courtney Zavala RN  Outcome: Ongoing, Progressing  3/26/2022 1042 by Courtney Zavala RN  Outcome: Ongoing, Progressing     Problem: Impaired Wound Healing  Goal: Optimal Wound Healing  3/26/2022 1042 by Courtney Zavala RN  Outcome: Ongoing, Progressing  3/26/2022 1042 by Courtney Zavala RN  Outcome: Ongoing, Progressing     Problem: Skin Injury Risk Increased  Goal: Skin Health and Integrity  3/26/2022 1042 by Courtney Zavala RN  Outcome: Ongoing, Progressing  3/26/2022 1042 by Courtney Zavala RN  Outcome: Ongoing, Progressing

## 2022-03-26 NOTE — HPI
Suyapa Connelly is a 56F with CAD s/p CABG, afib, CKD III, T2DM, osteo with steomyelitis with previous left AKA and newer Right BKA (12/2021), subsequent revision for poor healing secondary to PAD (Jan 2022), revised RAKA 3/18 who presents for swelling of her stumps. She says that after her last admission she was supposed to be discharged with Lasix, but she never received a script. No drainage or redness to the stump, just swelling particularly at her most recently revised stump (R). She does not have CP or SOB, no cough. The pain of her stumps are at baseline.     Of note she went to an OSH for this complaint and ultimately left AMA while waiting for transfer here to be evaluated by vasc surg. There her CXR showed concern for atelectasis vs pnemonia    Afebrile without leukocytosis, Cr 2.5, (BL ~1.8-2.0) and K+ 5.8; . Plain films of stump reviewed by vascular, no concerns for infection, well appearing

## 2022-03-26 NOTE — HPI
Patient is a 57 y/o F with CKD, CAD s/p CABG, PAD, s/p right AKA 5/18/21 and left AKA 3/18/21 (last week) who was brought to the ED by her  for concern of fluid leaking from her surgical wounds. She initially had a right BKA 12/28/21 however the wound failed to heal and she developed osteomyelitis. For this reason she underwent revision with AKA. She was discharged but her  states they did not get her lasix prescription for unclear reasons and her legs began to swell. They initially presented to the ED in a hospital in [place] and they were concerned that it may be infected. She was discharged and her  brought her to [facility] ED. She denies any pain and foul-smelling discharge. She has no other complaints.

## 2022-03-26 NOTE — ASSESSMENT & PLAN NOTE
Lab Results   Component Value Date    HGBA1C 6.9 (H) 03/26/2022     Diabetic diet, low dose SSI  Accuchecks ANEUDY

## 2022-03-27 VITALS
SYSTOLIC BLOOD PRESSURE: 148 MMHG | RESPIRATION RATE: 17 BRPM | DIASTOLIC BLOOD PRESSURE: 72 MMHG | HEART RATE: 94 BPM | OXYGEN SATURATION: 100 % | TEMPERATURE: 97 F

## 2022-03-27 PROBLEM — N18.30 STAGE 3 CHRONIC KIDNEY DISEASE DUE TO TYPE 2 DIABETES MELLITUS: Status: RESOLVED | Noted: 2021-07-01 | Resolved: 2022-03-27

## 2022-03-27 PROBLEM — I50.32 CHRONIC DIASTOLIC HEART FAILURE: Status: RESOLVED | Noted: 2019-12-08 | Resolved: 2022-03-27

## 2022-03-27 PROBLEM — E87.20 ACIDOSIS: Status: ACTIVE | Noted: 2022-03-27

## 2022-03-27 PROBLEM — E87.20 ACIDOSIS: Status: RESOLVED | Noted: 2022-03-27 | Resolved: 2022-03-27

## 2022-03-27 PROBLEM — D64.9 ANEMIA: Status: ACTIVE | Noted: 2020-01-27

## 2022-03-27 PROBLEM — L03.90 CELLULITIS: Status: RESOLVED | Noted: 2021-12-21 | Resolved: 2022-03-27

## 2022-03-27 PROBLEM — E11.22 STAGE 3 CHRONIC KIDNEY DISEASE DUE TO TYPE 2 DIABETES MELLITUS: Status: RESOLVED | Noted: 2021-07-01 | Resolved: 2022-03-27

## 2022-03-27 LAB
ANION GAP SERPL CALC-SCNC: 7 MMOL/L (ref 8–16)
BACTERIA UR CULT: NORMAL
BUN SERPL-MCNC: 69 MG/DL (ref 6–20)
CALCIUM SERPL-MCNC: 9.3 MG/DL (ref 8.7–10.5)
CHLORIDE SERPL-SCNC: 103 MMOL/L (ref 95–110)
CO2 SERPL-SCNC: 23 MMOL/L (ref 23–29)
CREAT SERPL-MCNC: 2.1 MG/DL (ref 0.5–1.4)
EST. GFR  (AFRICAN AMERICAN): 29.9 ML/MIN/1.73 M^2
EST. GFR  (NON AFRICAN AMERICAN): 25.9 ML/MIN/1.73 M^2
GLUCOSE SERPL-MCNC: 114 MG/DL (ref 70–110)
POCT GLUCOSE: 211 MG/DL (ref 70–110)
POCT GLUCOSE: 91 MG/DL (ref 70–110)
POTASSIUM SERPL-SCNC: 5.6 MMOL/L (ref 3.5–5.1)
SODIUM SERPL-SCNC: 133 MMOL/L (ref 136–145)

## 2022-03-27 PROCEDURE — 99217 PR OBSERVATION CARE DISCHARGE: CPT | Mod: ,,, | Performed by: PHYSICIAN ASSISTANT

## 2022-03-27 PROCEDURE — G0378 HOSPITAL OBSERVATION PER HR: HCPCS

## 2022-03-27 PROCEDURE — 99217 PR OBSERVATION CARE DISCHARGE: ICD-10-PCS | Mod: ,,, | Performed by: PHYSICIAN ASSISTANT

## 2022-03-27 PROCEDURE — 94761 N-INVAS EAR/PLS OXIMETRY MLT: CPT

## 2022-03-27 PROCEDURE — 25000003 PHARM REV CODE 250: Performed by: PHYSICIAN ASSISTANT

## 2022-03-27 PROCEDURE — 80048 BASIC METABOLIC PNL TOTAL CA: CPT | Performed by: PHYSICIAN ASSISTANT

## 2022-03-27 PROCEDURE — 36415 COLL VENOUS BLD VENIPUNCTURE: CPT | Performed by: PHYSICIAN ASSISTANT

## 2022-03-27 PROCEDURE — 96376 TX/PRO/DX INJ SAME DRUG ADON: CPT

## 2022-03-27 PROCEDURE — 63600175 PHARM REV CODE 636 W HCPCS: Performed by: PHYSICIAN ASSISTANT

## 2022-03-27 RX ORDER — FUROSEMIDE 40 MG/1
40 TABLET ORAL DAILY
Qty: 30 TABLET | Refills: 2 | Status: SHIPPED | OUTPATIENT
Start: 2022-03-27 | End: 2022-05-21

## 2022-03-27 RX ORDER — ALBUTEROL SULFATE 2.5 MG/.5ML
10 SOLUTION RESPIRATORY (INHALATION) ONCE
Status: DISCONTINUED | OUTPATIENT
Start: 2022-03-27 | End: 2022-03-27

## 2022-03-27 RX ADMIN — INSULIN ASPART 2 UNITS: 100 INJECTION, SOLUTION INTRAVENOUS; SUBCUTANEOUS at 01:03

## 2022-03-27 RX ADMIN — NIFEDIPINE 30 MG: 30 TABLET, FILM COATED, EXTENDED RELEASE ORAL at 09:03

## 2022-03-27 RX ADMIN — CLOPIDOGREL 75 MG: 75 TABLET, FILM COATED ORAL at 09:03

## 2022-03-27 RX ADMIN — SERTRALINE HYDROCHLORIDE 50 MG: 50 TABLET ORAL at 09:03

## 2022-03-27 RX ADMIN — PREGABALIN 75 MG: 75 CAPSULE ORAL at 09:03

## 2022-03-27 RX ADMIN — ALLOPURINOL 100 MG: 100 TABLET ORAL at 09:03

## 2022-03-27 RX ADMIN — FUROSEMIDE 40 MG: 10 INJECTION, SOLUTION INTRAMUSCULAR; INTRAVENOUS at 09:03

## 2022-03-27 RX ADMIN — SODIUM BICARBONATE 650 MG TABLET 650 MG: at 09:03

## 2022-03-27 RX ADMIN — INSULIN DETEMIR 5 UNITS: 100 INJECTION, SOLUTION SUBCUTANEOUS at 09:03

## 2022-03-27 RX ADMIN — SODIUM ZIRCONIUM CYCLOSILICATE 10 G: 10 POWDER, FOR SUSPENSION ORAL at 01:03

## 2022-03-27 RX ADMIN — CARVEDILOL 3.12 MG: 3.12 TABLET, FILM COATED ORAL at 09:03

## 2022-03-27 NOTE — DISCHARGE INSTRUCTIONS
Please call if you have any questions or concerns!!   VOICEMAIL  1. Caller Name: Lizy Saleem                        Call Back Number: [phone] (home)       2. Message: Patient called and left a message on why she was prescribed DIFLUCAN. I have called her and spoke with her about Dr. RENALDO Stiles message to her. She also stated she wanted to cancel her upcoming appointment.     3. Patient approves office to leave a detailed voicemail/Go message: N\A

## 2022-03-27 NOTE — PLAN OF CARE
Problem: Adult Inpatient Plan of Care  Goal: Plan of Care Review  Outcome: Met  Goal: Patient-Specific Goal (Individualized)  Outcome: Met  Goal: Absence of Hospital-Acquired Illness or Injury  Outcome: Met  Goal: Optimal Comfort and Wellbeing  Outcome: Met  Goal: Readiness for Transition of Care  Outcome: Met     Problem: Diabetes Comorbidity  Goal: Blood Glucose Level Within Targeted Range  Outcome: Met     Problem: Adjustment to Illness (Sepsis/Septic Shock)  Goal: Optimal Coping  Outcome: Met     Problem: Bleeding (Sepsis/Septic Shock)  Goal: Absence of Bleeding  Outcome: Met     Problem: Glycemic Control Impaired (Sepsis/Septic Shock)  Goal: Blood Glucose Level Within Desired Range  Outcome: Met     Problem: Infection Progression (Sepsis/Septic Shock)  Goal: Absence of Infection Signs and Symptoms  Outcome: Met     Problem: Nutrition Impaired (Sepsis/Septic Shock)  Goal: Optimal Nutrition Intake  Outcome: Met     Problem: Fluid and Electrolyte Imbalance (Acute Kidney Injury/Impairment)  Goal: Fluid and Electrolyte Balance  Outcome: Met     Problem: Oral Intake Inadequate (Acute Kidney Injury/Impairment)  Goal: Optimal Nutrition Intake  Outcome: Met     Problem: Renal Function Impairment (Acute Kidney Injury/Impairment)  Goal: Effective Renal Function  Outcome: Met     Problem: Impaired Wound Healing  Goal: Optimal Wound Healing  Outcome: Met     Problem: Skin Injury Risk Increased  Goal: Skin Health and Integrity  Outcome: Met     Problem: Infection  Goal: Absence of Infection Signs and Symptoms  Outcome: Met     Problem: Fall Injury Risk  Goal: Absence of Fall and Fall-Related Injury  Outcome: Met

## 2022-03-27 NOTE — NURSING
Discharge instructions/education provided to patient and . Pt and  verbalize understanding. IV removed. Pt tolerated well.

## 2022-03-27 NOTE — CONSULTS
Food & Nutrition  Education      Diet Education: Heart Failure Nutrition Therapy    Time Spent: 10 minutes  Learners: Patient and spouse       Nutrition Education provided with handouts: Heart Failure Nutrition Therapy handout provided detailing how to limit salt/fat intake and abide by fluid restriction. Emphasized importance of diet adherence. Pt verbalized understanding. All questions answered.      Comments: Pt and CG very involved in education. Report following Low Na diet at home that is limited in red meats and fried foods. High F/V consumption. Wt stable. Denies any significant wt changes. No indicators of malnutrition.      All questions and concerns answered. Dietitian's contact information provided.       Follow-Up: Yes     Please Re-consult as needed.  Thanks!    Rubina Vasques, MS, RD, LDN  Ext: [phone]

## 2022-03-27 NOTE — PLAN OF CARE
Patient A x 4 ,   at bedside,  movement encouraged .  Family request glucerna  three times daily.  RT leg pain managed with medicine and repositioning. Slept without distress    Problem: Nutrition Impaired (Sepsis/Septic Shock)  Goal: Optimal Nutrition Intake  Outcome: Ongoing, Progressing     Problem: Glycemic Control Impaired (Sepsis/Septic Shock)  Goal: Blood Glucose Level Within Desired Range  Outcome: Ongoing, Progressing

## 2022-03-28 NOTE — DISCHARGE SUMMARY
[facility] [address]  [facility]  Discharge Summary      Patient Name: Suyapa Connelly  MRN: [id number]  Patient Class: OP- Observation  Admission Date: 3/26/2022  Hospital Length of Stay: 0 days  Discharge Date and Time: 3/27/2022  2:34 PM  Attending Physician: No att. providers found   Discharging Provider: Cecilia Ricardo PA-C  Primary Care Provider: Magen Christensen MD  [facility]: [facility] Cecilia Ricardo PA-C    HPI:   Suyapa Connelly is a 56F with CAD s/p CABG, afib, CKD III, T2DM, osteo with steomyelitis with previous left AKA and newer Right BKA (12/2021), subsequent revision for poor healing secondary to PAD (Jan 2022), revised RAKA 3/18 who presents for swelling of her stumps. She says that after her last admission she was supposed to be discharged with Lasix, but she never received a script. No drainage or redness to the stump, just swelling particularly at her most recently revised stump (R). She does not have CP or SOB, no cough. The pain of her stumps are at baseline.     Of note she went to an OSH for this complaint and ultimately left AMA while waiting for transfer here to be evaluated by vasc surg. There her CXR showed concern for atelectasis vs pnemonia    Afebrile without leukocytosis, Cr 2.5, (BL ~1.8-2.0) and K+ 5.8; . Plain films of stump reviewed by vascular, no concerns for infection, well appearing      * No surgery found *      Hospital Course:   Patient admitted for edema of her stump. Vascular surgery evaluated, appears well healed, no evidence of infection. Unfortunately there was an issue with her lasix previously and she did not get her prescription. Diuresed well with lasix 40mg IVP, net neg ~2L at dc. She reports improvement in her ROM after diuresis,  agrees she is lighter and he is able to care for her more easily than on admission. Stable for dc, lasix sent to bedside prior to dc. Return precautions discussed, no  further questions.        Goals of Care Treatment Preferences:  Code Status: Full Code      Consults:   Consults (From admission, onward)        Status Ordering Provider     Inpatient consult to Registered Dietitian/Nutritionist  Once        Provider:  (Not yet assigned)    Completed CEE MCFARLANE     Inpatient consult to Vascular Surgery  Once        Provider:  (Not yet assigned)    Completed REJI SANTOS          * Acute on chronic diastolic CHF (congestive heart failure)  Patient here with swelling of her stump, no signs of infection. Unfortunately did not have her script for lasix 40mg PO, suspect hypervolemia and ROSLYN (see below) related to CHF    - maintaining sats ORA  -   - CXR with atelectasis, lower suspicion for pneumonia given maintaining sats ORA, afebrile without WBC, no cough  - will start CHF pathway: diuresis with lasix 40mg IVP daily  - net -~2L  - cardiac, fluid restricted diet; instructed on dietary precautions   - strict I/os, daily wts  -  prescription for lasix sent to bedside at dc      Paroxysmal atrial fibrillation  Patient has hx afib, on coreg  She had been on eliquis, unsure why it was discontinued. Perhaps she was instructed to hold it for surgery and never resumed? Ok to resume per vasc surg      Type 2 diabetes mellitus with diabetic nephropathy, with long-term current use of insulin  Lab Results   Component Value Date    HGBA1C 6.9 (H) 03/26/2022     Diabetic diet, low dose SSI  Accuchecks ACHS      Chronic anemia  Stable and at BL ~8      Adjustment disorder with mixed anxiety and depressed mood  Ci=ontinue home sertraline and mirtazipine        Final Active Diagnoses:    Diagnosis Date Noted POA    PRINCIPAL PROBLEM:  Acute on chronic diastolic CHF (congestive heart failure) [I50.33] 12/08/2019 Yes    Acute renal failure superimposed on stage 3 chronic kidney disease [N17.9, N18.30] 07/07/2015 Yes    Status post above-knee amputation of both lower  extremities [Z89.611, Z89.612] 03/26/2022 Not Applicable    Coronary atherosclerosis [I25.10] 01/02/2020 Yes    Paroxysmal atrial fibrillation [I48.0] 01/28/2020 Yes     Chronic    Type 2 diabetes mellitus with diabetic nephropathy, with long-term current use of insulin [E11.21, Z79.4] 02/24/2021 Not Applicable    Chronic anemia [D64.9] 03/23/2021 Yes    Adjustment disorder with mixed anxiety and depressed mood [F43.23] 06/16/2021 Yes    Leg swelling [M79.89] 03/26/2022 Yes    Hyperkalemia [E87.5] 03/26/2022 Yes    ROSLYN (acute kidney injury) [N17.9] 01/31/2022 Yes    Class 2 severe obesity due to excess calories with serious comorbidity in adult [E66.01] 05/24/2021 Yes    Stage 3 chronic kidney disease [N18.30] 07/18/2020 Yes     Chronic    S/P CABG x 1 [Z95.1] 01/27/2020 Not Applicable    Anemia [D64.9] 01/27/2020 Yes    Mixed hyperlipidemia [E78.2] 12/13/2019 Yes     Chronic      Problems Resolved During this Admission:    Diagnosis Date Noted Date Resolved POA    Acidosis [E87.2] 03/27/2022 03/27/2022 Yes    Stage 3 chronic kidney disease due to type 2 diabetes mellitus [E11.22, N18.30] 07/01/2021 03/27/2022 Yes       Discharged Condition: stable    Disposition: Home or Self Care    Follow Up:    Patient Instructions:      Ambulatory referral/consult to Cardiology   Standing Status: Future   Referral Priority: Routine Referral Type: Consultation   Referral Reason: Specialty Services Required   Requested Specialty: Cardiology   Number of Visits Requested: 1     Call MD for:  temperature >100.4     Call MD for:  persistent nausea and vomiting or diarrhea     Call MD for:  severe uncontrolled pain     Call MD for:  redness, tenderness, or signs of infection (pain, swelling, redness, odor or green/yellow discharge around incision site)     Call MD for:  difficulty breathing or increased cough     Call MD for:  severe persistent headache     Call MD for:  worsening rash     Call MD for:  persistent  dizziness, light-headedness, or visual disturbances     Call MD for:  increased confusion or weakness     Activity as tolerated       Significant Diagnostic Studies: Radiology: cxr: increased left lower lung zone airspace opacities  Femur xray:    Trace air in the soft tissues of the distal stump which can be postop related or indicate soft tissue infection.  Correlate clinically.  Osseous stump appears unremarkable.          Pending Diagnostic Studies:     Procedure Component Value Units Date/Time    CBC auto differential [[id number]] Collected: 03/26/22 0307    Order Status: Sent Lab Status: In process Updated: 03/26/22 0307    Specimen: Blood          Medications:  Reconciled Home Medications:      Medication List      CHANGE how you take these medications    furosemide 40 MG tablet  Commonly known as: LASIX  Take 1 tablet (40 mg total) by mouth once daily.  What changed: additional instructions        CONTINUE taking these medications    acetaminophen 500 MG tablet  Commonly known as: TYLENOL  Take 2 tablets (1,000 mg total) by mouth every 8 (eight) hours as needed for Pain.     allopurinoL 100 MG tablet  Commonly known as: ZYLOPRIM  Take 1 tablet (100 mg total) by mouth once daily.     amitriptyline 10 MG tablet  Commonly known as: ELAVIL  Take 1 tablet (10 mg total) by mouth nightly as needed for Pain.     atorvastatin 80 MG tablet  Commonly known as: LIPITOR  Take 1 tablet (80 mg total) by mouth every evening.     carvediloL 3.125 MG tablet  Commonly known as: COREG  Take 1 tablet (3.125 mg total) by mouth 2 (two) times daily.     clopidogreL 75 mg tablet  Commonly known as: PLAVIX  Take 1 tablet (75 mg total) by mouth once daily.     DEXCOM G6  Misc  Generic drug: blood-glucose meter,continuous  Use to check blood sugars 4 times/day     DEXCOM G6 SENSOR Radha  Generic drug: blood-glucose sensor  Apply one sensor to skin every 10 days     DEXCOM G6 TRANSMITTER Radha  Generic drug: blood-glucose  transmitter  Replace transmitter every 3 months to use with dexcom sensor     insulin aspart U-100 100 unit/mL (3 mL) Inpn pen  Commonly known as: NovoLOG Flexpen U-100 Insulin  Inject 5 Units into the skin 3 (three) times daily with meals. If not eating, ok to hold     insulin detemir U-100 100 unit/mL (3 mL) Inpn pen  Commonly known as: Levemir FLEXTOUCH  Inject 12 Units into the skin every evening.     magnesium oxide 400 mg (241.3 mg magnesium) tablet  Commonly known as: MAG-OX  Take 1 tablet (400 mg total) by mouth 2 (two) times daily.     melatonin 3 mg tablet  Commonly known as: MELATIN  Take 2 tablets (6 mg total) by mouth nightly as needed for Insomnia.     mirtazapine 7.5 MG Tab  Commonly known as: REMERON  Take 2 tablets (15 mg total) by mouth nightly.     NIFEdipine 30 MG (OSM) 24 hr tablet  Commonly known as: PROCARDIA-XL  Take 1 tablet (30 mg total) by mouth once daily.     ondansetron 8 MG Tbdl  Commonly known as: ZOFRAN-ODT  Dissolve 1 tablet (8 mg total) by mouth every 8 (eight) hours as needed.     ONE-A-DAY WOMEN'S 50 PLUS ORAL  Take 1 tablet by mouth Daily.     oxyCODONE 5 MG immediate release tablet  Commonly known as: ROXICODONE  Take 1 tablet (5 mg total) by mouth every 6 (six) hours as needed for Pain.     pregabalin 75 MG capsule  Commonly known as: LYRICA  Take 1 capsule (75 mg total) by mouth 3 (three) times daily.     sertraline 50 MG tablet  Commonly known as: ZOLOFT  Take 1 tablet (50 mg total) by mouth once daily.     sodium bicarbonate 650 MG tablet  Take 1 tablet (650 mg total) by mouth 2 (two) times daily. Please hold until follow up with Primary Care Provider        STOP taking these medications    XARELTO 2.5 mg Tab  Generic drug: rivaroxaban            Indwelling Lines/Drains at time of discharge:   Lines/Drains/Airways     None                 Time spent on the discharge of patient: >35 minutes         Cecilia Ricardo PA-C  Department of Hospital Medicine  [facility] - Telemetry  Stepdown ([place]-7)

## 2022-03-28 NOTE — ASSESSMENT & PLAN NOTE
Patient here with swelling of her stump, no signs of infection. Unfortunately did not have her script for lasix 40mg PO, suspect hypervolemia and ROSLYN (see below) related to CHF    - maintaining sats ORA  -   - CXR with atelectasis, lower suspicion for pneumonia given maintaining sats ORA, afebrile without WBC, no cough  - will start CHF pathway: diuresis with lasix 40mg IVP daily  - net -~2L  - cardiac, fluid restricted diet; instructed on dietary precautions   - strict I/os, daily wts  -  prescription for lasix sent to bedside at dc

## 2022-03-28 NOTE — HOSPITAL COURSE
Patient admitted for edema of her stump. Vascular surgery evaluated, appears well healed, no evidence of infection. Unfortunately there was an issue with her lasix previously and she did not get her prescription. Diuresed well with lasix 40mg IVP, net neg ~2L at dc. She reports improvement in her ROM after diuresis,  agrees she is lighter and he is able to care for her more easily than on admission. Stable for dc, lasix sent to bedside prior to dc. Return precautions discussed, no further questions.

## 2022-03-28 NOTE — ASSESSMENT & PLAN NOTE
Patient has hx afib, on coreg  She had been on eliquis, unsure why it was discontinued. Perhaps she was instructed to hold it for surgery and never resumed? Ok to resume per vasc surg

## 2022-03-29 ENCOUNTER — EXTERNAL HOME HEALTH (OUTPATIENT)
Dept: HOME HEALTH SERVICES | Facility: HOSPITAL | Age: 57
End: 2022-03-29
Payer: MEDICARE

## 2022-03-29 NOTE — PHYSICIAN QUERY
PT Name: Suyapa Connelly  MR #: [id number]     DOCUMENTATION CLARIFICATION      CDS/: DIONISIO Patterson, RN, CCDS              Contact information: [email or web address]  This form is a permanent document in the medical record.     Query Date: March 29, 2022    By submitting this query, we are merely seeking further clarification of documentation to reflect the severity of illness of your patient. Please utilize your independent clinical judgment when addressing the question(s) below.    The Medical Record contains the following:     Indicators   Supporting Clinical Findings Location in Medical Record   X Documentation/History of condition extensive past surgical history most significant for prior L AKA and recent R BKA with subsequent revision for poor healing ([place]- Jan 2022) both secondary to PAD.    Patient is currently admitted to the hospital medicine service with osteomyelitis of the R BKA stump with break down of the wound/poor healing.  Vascular Surgery was consulted for evaluation of the extremity for possible revision to AKA given the ongoing infection and poor healing H & P: Dr. Florez 3/15    Pathology/Culture      Lab Value(s)     X Radiology Findings Mild increased abnormal medullary signal within the distal 1 cm of the tibial stump with associated cortical indistinctness, findings that are concerning for early osteomyelitis   MRI: 3/13    Treatment/Medication     X Other 57yo F with non-healing BKA. No obvious infection. She has known PAD and contralateral AKA. Recommend AKA to improve chances of future ambulation and improve healing    H & P: Dr. Florez 3/15      Provider, please further specify the diagnosis of Osteomyelitis.  [   ] Acute   [   ] Chronic   [   ] Subacute   [   ] Acute on chronic   [   ] Other clarification (please specify): _______I did not order the MRI. I did not treat the patient for any form of osteomyelitis. I treated the patient for a non-healing below-knee  amputation._____   [   ] Clinically undetermined     Please document in your progress notes daily for the duration of treatment until resolved, and include in your discharge summary.    Form No. [id number]

## 2022-03-29 NOTE — PHYSICIAN QUERY
PT Name: Suyapa Connelly  MR #: [id number]     Documentation Clarification      CDS/: DIONISIO Patterson, RN, CCDS               Contact information: [email or web address]    This form is a permanent document in the medical record.     Query Date: March 29, 2022    By submitting this query, we are merely seeking further clarification of documentation. Please utilize your independent clinical judgment when addressing the question(s) below.    The Medical Record reflects the following:    Supporting Clinical Findings Location in Medical Record   Post-op Diagnosis:  Post-Op Diagnosis Codes:     * S/P BKA (below knee amputation) unilateral, right    [place] incision was planned using half the circumference of the thigh as a guide.  Skin was divided sharply with a 10 blade and soft tissue and fascia divided with electrocautery    There were several geniculate collaterals that were ligated during muscle division.  Femur was cleared anteriorly.  Popliteal artery and adjacent veins were sharply cleared, clamped, and divided.  These were double suture ligated proximally     Femur was circumferentially cleared and soft tissue was peeled back beyond incision apex with a periosteal elevator.  Femur was then divided and rasped with a power saw.   Remainder of the leg was amputated with an amputation knife. Op Note: Dr. Florez                                                                                Provider, please clarify amputation site associated with above clinical findings.    [   ] High   [ x  ] Mid   [   ] Low   [   ] Other (please specify): ____________   [  ] Clinically undetermined

## 2022-03-30 ENCOUNTER — OUTPATIENT CASE MANAGEMENT (OUTPATIENT)
Dept: ADMINISTRATIVE | Facility: OTHER | Age: 57
End: 2022-03-30
Payer: MEDICARE

## 2022-03-30 ENCOUNTER — DOCUMENTATION ONLY (OUTPATIENT)
Dept: CARDIOLOGY | Facility: CLINIC | Age: 57
End: 2022-03-30
Payer: MEDICARE

## 2022-03-30 LAB
FINAL PATHOLOGIC DIAGNOSIS: NORMAL
GROSS: NORMAL

## 2022-03-30 NOTE — LETTER
April 1, 2022    Suyapa Connelly  [address] [place] [address]             [facility] [address] [place] [address] Dear Ms Connelly:    Welcome to [facility]s Complex Care Management Program.  It was a pleasure talking with you today.  My name is Tracy Coleman RN CCM. I look forward to working with you as your .  My goal is to help you function at the healthiest and highest level possible.  You can contact me directly at [phone].    As an [facility] patient with [employer], some of the services we may be able to provide include:      Development of an individualized care plan with a Registered Nurse    Connection with a    Connection with available resources and services     Coordinate communication among your care team members    Provide coaching and education    Help you understand your doctors treatment plan   Help you obtain information about your insurance coverage.     All services provided by [facility]s Complex Care Managers and other care team members are coordinated with and communicated to your primary care team.    As part of your enrollment, you will be receiving education materials and more information about these services in your [facility] account, by phone or through the mail.  If you do not wish to participate or receive information, please contact our office at [phone].      Sincerely,        Tracy Coleman RN CCM   [facility]   Out-patient RN Complex Care Manager

## 2022-03-30 NOTE — PROGRESS NOTES
Outpatient Care Management  Initial Patient Assessment    Patient: Suyapa Connelly  MRN: [id number]  Date of Service: 03/30/2022  Completed by: Tracy Coleman RN  Referral Date: 03/13/2022  Program:     Reason for Visit   Patient presents with    OPCM Chart Review    OPCM Enrollment Call    OPCM RN First Assessment Attempt     03/30/22    Initial Assessment     03/31/22    Initial Screen     04/01/22    Nursing Assessment     04/01/22    PHQ-9     04/01/22    Plan Of Care     04/01/22       Brief Summary:  Suyapa Connelly was referred by hospital  for s/p right BKA, s/p Left AKA, anxiety and depression. . Patient qualifies for program based on High Risk Score 95.9%  Active problem list, medical, surgical and social history reviewed. Active comorbidities include  Areas of need identified by patient include  s/p right BKA, s/p Left AKA, anxiety,A fib, CHF, anemia  diabetes. depression  Complex care plan created with patient/caregiver input. By next encounter, patient agrees to go to wound care appt on 04/07/22.   03/30/22-Attempt assessment with  patient for outpatient case management. No answer. Left message requesting call back. RN OPCM 1'st assessment attempt.   03/31/22-Called and spoke to  Randell. Ms Dale is a double amputee. She has a Dexcom 109-151. She has home health with a nurse. Will order him mom's meals. He is asking about PT/OT and he will talk to home health  nurse about these services. He is asking about getting pads, wipes and briefs. Updated him to call the [facility] to see if they would supply these.  Ordered him mom's meals and will be delivered on April 6, 2022. He is able to afford all her medications.He is asking about [employer] benefits.  Will mail him OTC catalog.  Will follow up with them to complete assessment.    04/01/22-Called and assessment done on speaker phone with Ms Dale and , Mr. Mejias.Updated them that mom's meals would be delivered on 04/06/22.   states that [employer] called them two hours after they spoke to RN yesterday.  She had BKA right on 12/28/21. She had a AKA right on 03/18/22. On 05/18/21 had AKA left. She was in the bed during assessment. She was in rehab for 8 or 10 days on [facility]. Rehab  ordered her a customized w/c that will be delivered in a month. She is wondering why she can not get a scooter. She is on a diabetic/low sodium diet. She has [facility] nurse that came yesterday for wound care. She has an appt on 04/07/22 to have her staples removed.   CM ACTION PLAN:  Follow up in two weeks  Mail pill box, glucerna coupons and foods high in iron, how to prevent surgical site infection   Refer to  for [employer] benefits, ramp at house, glasses, life alert, w/c follow up with possible scooter, depression (PHQ=7), [facility] bills and financial assistance.   Message to PCP, PHQ=7-taking elavil - declines counseling     Assessment Documentation     OPCM Initial Assessment    Involvement of Care  Do I have permission to speak with other family members about your care?: Yes (Comment: - Randell)  Assessment completed by: Spouse, Patient  Identified Areas of Need  Advanced Care Planning: Yes (Comment: refer to )  Housing: no  Medication Adherence: No  *Active medication list was reviewed and reconciled with patient and/or caregiver:   Nutrition: no  Lab Adherence: no  Depression: Yes  Cognitive/Behavioral Health: no  Communication: no  Health Literacy: no  Fall risk?: No  Equipment/Supplies/Services: no          Problem List and History     Problems Addressed This Visit    Atrial Fibrillation: Not identified by patient as current problem  Depression: Identified as current problem  Heart Failure: Not identified by patient as current problem  Hypertension: Not identified by patient as current problem  Diabetes: Not identified by patient as current problem  Anemia: Not identified by patient as current problem  Chronic Kidney  Disease: Not identified by patient as current problem  Hyperlipidemia: Not identified by patient as current problem  UTI: Not identified by patient as current problem  Heart Disease: Not identified by patient as current problem         Reviewed medical and social history with patient and/or caregiver. A complex care plan was discussed and completed today, with input from patient and/or caregiver.    Patient Instructions     Instructions were provided via the [facility] patient resources and are available for the patient to view on the patient portal, if active.    Follow up in 14 days around 04/14/2022    Todays OPCM Self-Management Care Plan was developed with the patients/caregivers input and was based on identified barriers from todays assessment.  Goals were written today with the patient/caregiver and the patient has agreed to work towards these goals to improve his/her overall well-being. Patient verbalized understanding of the care plan, goals, and all of today's instructions. Encouraged patient/caregiver to communicate with his/her physician and health care team about health conditions and the treatment plan.  Provided my contact information today and encouraged patient/caregiver to call me with any questions as needed.

## 2022-03-30 NOTE — PROGRESS NOTES
Heart Failure Transitional Care Clinic (HFTCC) Team notified of pt referral via [facility] Failure [facility] (automated inbasket notification) .    Patient screened today by provider and RN for enrollment to program.      Pt was deemed not a candidate for enrollment at this time related to patient primary presenting complaint would not benefit from Heart Failure Transitional Care Program.  - Pt presented to ER with stump swelling but does not appear to be CHF related.  Last echo reviewed as well.      Pt will require additional follow up planning: pt has f/u scheduled with PCP, woundcare and endocrine. Also enrolled in OPCM    If pt status, diagnosis, or treatment plan changes , please place AMB referral to Heart Failure Transitional Care Clinic ([id number]).

## 2022-04-01 DIAGNOSIS — I73.9 PERIPHERAL ARTERY DISEASE: Primary | ICD-10-CM

## 2022-04-01 DIAGNOSIS — I70.229 CRITICAL LOWER LIMB ISCHEMIA: ICD-10-CM

## 2022-04-12 ENCOUNTER — OUTPATIENT CASE MANAGEMENT (OUTPATIENT)
Dept: ADMINISTRATIVE | Facility: OTHER | Age: 57
End: 2022-04-12
Payer: MEDICARE

## 2022-04-12 NOTE — LETTER
April 14, 2022    Suyapa Connelly  [address] [place] [address]             [facility] [address] [place] [address] Dear Mrs. Connelly:        I am writing from the Outpatient Care Management Department at [facility].  I received a referral from Tracy Coleman RN to contact you regarding any needs you may have.  I was unable to reach you by phone.   Please contact me if you would like to discuss any needs you may have.    I can be reached at [phone] Monday thru Friday from 8:00am to 4:30pm.  [facility] also has a program with a nurse available 24/7 to answer questions or provide medical advice.  [facility] can be reached at [phone].      If you have any questions or concerns, please don't hesitate to call.      Sincerely,  Aurora Phillip, DEVONW

## 2022-04-14 ENCOUNTER — OUTPATIENT CASE MANAGEMENT (OUTPATIENT)
Dept: ADMINISTRATIVE | Facility: OTHER | Age: 57
End: 2022-04-14
Payer: MEDICARE

## 2022-04-14 NOTE — LETTER
April 22, 2022    Suyapa Connelly  [address] [place] [address]             [facility] [address] [place] [address] Dear Ms Connelly:    I work with [facility] Outpatient Case Management Department. I have been unsuccessful at reaching you to follow-up to see how you have been doing. Please call me back at your earliest convenience to discuss your health care needs.      I can be reached at [phone]  from 8:00AM to 4:30 PM on Monday thru Friday. [facility] is a program offered through [facility] where a nurse is available 24/7 to answer questions or provide medical advice, their number is [phone].    Thanks,      Tracy Coleman RN [facility]   Outpatient Case Management

## 2022-04-14 NOTE — PROGRESS NOTES
2nd attempt to complete Social Work Assessment for OPCM; LCSW will mail a letter with contact information requesting a return call.  OPCM RN notified.

## 2022-04-14 NOTE — PROGRESS NOTES
04/14/22- Attempt to follow up with patient for outpatient case management. No answer. Left message requesting call back.  RN OPCM 1'st follow up attempt.   04/22/22-Attempt follow up with patient for outpatient case management. No answer. RN OPCM 2'nd follow up attempt. Letter Mailed.   05/06/22-Attempt to follow up with patient for outpatient case management. No answer. Left message requesting call back. RN OPCM 3'rd follow up attempt. OPCM Case Closure and dis-enroll from OPCM.

## 2022-04-22 ENCOUNTER — OFFICE VISIT (OUTPATIENT)
Dept: VASCULAR SURGERY | Facility: CLINIC | Age: 57
End: 2022-04-22
Attending: SURGERY
Payer: MEDICARE

## 2022-04-22 VITALS — HEART RATE: 93 BPM | SYSTOLIC BLOOD PRESSURE: 125 MMHG | DIASTOLIC BLOOD PRESSURE: 60 MMHG | TEMPERATURE: 98 F

## 2022-04-22 DIAGNOSIS — Z89.611 S/P AKA (ABOVE KNEE AMPUTATION) UNILATERAL, RIGHT: Primary | ICD-10-CM

## 2022-04-22 PROCEDURE — 99024 PR POST-OP FOLLOW-UP VISIT: ICD-10-PCS | Mod: S$GLB,,, | Performed by: SURGERY

## 2022-04-22 PROCEDURE — 4010F ACE/ARB THERAPY RXD/TAKEN: CPT | Mod: CPTII,S$GLB,, | Performed by: SURGERY

## 2022-04-22 PROCEDURE — 99499 RISK ADDL DX/OHS AUDIT: ICD-10-PCS | Mod: HCNC,S$GLB,, | Performed by: SURGERY

## 2022-04-22 PROCEDURE — 99499 UNLISTED E&M SERVICE: CPT | Mod: HCNC,S$GLB,, | Performed by: SURGERY

## 2022-04-22 PROCEDURE — 3074F SYST BP LT 130 MM HG: CPT | Mod: CPTII,S$GLB,, | Performed by: SURGERY

## 2022-04-22 PROCEDURE — 99024 POSTOP FOLLOW-UP VISIT: CPT | Mod: S$GLB,,, | Performed by: SURGERY

## 2022-04-22 PROCEDURE — 1159F PR MEDICATION LIST DOCUMENTED IN MEDICAL RECORD: ICD-10-PCS | Mod: CPTII,S$GLB,, | Performed by: SURGERY

## 2022-04-22 PROCEDURE — 99999 PR PBB SHADOW E&M-EST. PATIENT-LVL III: CPT | Mod: PBBFAC,,, | Performed by: SURGERY

## 2022-04-22 PROCEDURE — 3078F PR MOST RECENT DIASTOLIC BLOOD PRESSURE < 80 MM HG: ICD-10-PCS | Mod: CPTII,S$GLB,, | Performed by: SURGERY

## 2022-04-22 PROCEDURE — 4010F PR ACE/ARB THEARPY RXD/TAKEN: ICD-10-PCS | Mod: CPTII,S$GLB,, | Performed by: SURGERY

## 2022-04-22 PROCEDURE — 3044F PR MOST RECENT HEMOGLOBIN A1C LEVEL <7.0%: ICD-10-PCS | Mod: CPTII,S$GLB,, | Performed by: SURGERY

## 2022-04-22 PROCEDURE — 99999 PR PBB SHADOW E&M-EST. PATIENT-LVL III: ICD-10-PCS | Mod: PBBFAC,,, | Performed by: SURGERY

## 2022-04-22 PROCEDURE — 3044F HG A1C LEVEL LT 7.0%: CPT | Mod: CPTII,S$GLB,, | Performed by: SURGERY

## 2022-04-22 PROCEDURE — 3074F PR MOST RECENT SYSTOLIC BLOOD PRESSURE < 130 MM HG: ICD-10-PCS | Mod: CPTII,S$GLB,, | Performed by: SURGERY

## 2022-04-22 PROCEDURE — 1159F MED LIST DOCD IN RCRD: CPT | Mod: CPTII,S$GLB,, | Performed by: SURGERY

## 2022-04-22 PROCEDURE — 3078F DIAST BP <80 MM HG: CPT | Mod: CPTII,S$GLB,, | Performed by: SURGERY

## 2022-04-22 NOTE — PROGRESS NOTES
VASCULAR SURGERY NOTE    Patient ID: Suyapa Connelly is a 56 y.o. female.    I. HISTORY     Chief Complaint: right AKA    HPI: Suyapa Connelly is a 56 y.o. female who is here today for post-op appointment. PMHx CKD, DM, HTN, HLD, MI, L AKA and recent R BKA with subsequent revision for poor healing ([place]- Jan 2022) both secondary to PAD. admitted to hospital for osteomyelitis of R BKA stump with break down and poor wound healing, now s/p right AKA with me 3/18/22. She presents today for routine post op wound check. Right AKA stump has been healing well, pt has kept it dressed with island dressing, she has minimal pain at stump site. She has no fever, chills, or aches.        Past Medical History:   Diagnosis Date    Anxiety     Chronic pain syndrome     CKD (chronic kidney disease), stage III     Depression     Diabetes mellitus     type 2    Diabetes mellitus, type 2     GERD (gastroesophageal reflux disease)     Hyperemesis 3/23/2021    Hyperlipemia     Hypertension     Hypokalemia 3/23/2021    Myocardial infarction 2010    minor-caused by stress per pt.    Osteomyelitis     Osteomyelitis of left foot 4/30/2021    PVD (peripheral vascular disease)     Vaginal delivery     x1        Past Surgical History:   Procedure Laterality Date    ABOVE-KNEE AMPUTATION Left 5/18/2021    Procedure: AMPUTATION, ABOVE KNEE;  Surgeon: Teddy Huber MD;  Location: [address];  Service: Vascular;  Laterality: Left;    ABOVE-KNEE AMPUTATION Right 3/18/2022    Procedure: AMPUTATION, ABOVE KNEE;  Surgeon: DAYNE Florez II, MD;  Location: [facility] OR [address] [place] [address];  Service: Vascular;  Laterality: Right;    Angiogram - Right Extremity Right 7/9/15    angiogram-left leg  10/6/15    ANGIOGRAPHY OF LOWER EXTREMITY Left 4/29/2021    Procedure: ANGIOGRAM, LOWER EXTREMITY;  Surgeon: Teddy Huber MD;  Location: [facility] OR [address] [place] [address];  Service: Vascular;  Laterality: Left;    BELOW KNEE AMPUTATION OF LOWER  EXTREMITY Right 12/28/2021    Procedure: AMPUTATION, BELOW KNEE;  Surgeon: Kaitlyn Rojas MD;  Location: [facility] OR;  Service: General;  Laterality: Right;    CATHETERIZATION OF BOTH LEFT AND RIGHT HEART N/A 12/18/2019    Procedure: CATHETERIZATION, HEART, BOTH LEFT AND RIGHT;  Surgeon: Que Fernando III, MD;  Location: [facility] CATH LAB;  Service: Cardiology;  Laterality: N/A;    CORONARY ANGIOGRAPHY N/A 12/18/2019    Procedure: ANGIOGRAM, CORONARY ARTERY;  Surgeon: Que Fernando III, MD;  Location: [facility] CATH LAB;  Service: Cardiology;  Laterality: N/A;    CORONARY ANGIOGRAPHY INCLUDING BYPASS GRAFTS WITH CATHETERIZATION OF LEFT HEART N/A 7/28/2020    Procedure: ANGIOGRAM, CORONARY, INCLUDING BYPASS GRAFT, WITH LEFT HEART CATHETERIZATION, 9 am;  Surgeon: Rachel Easley MD;  Location: [facility] CATH LAB;  Service: Cardiology;  Laterality: N/A;    CORONARY ARTERY BYPASS GRAFT (CABG) N/A 1/14/2020    Procedure: CORONARY ARTERY BYPASS GRAFT (CABG) x 1     Off Pump;  Surgeon: Huang Altamirano MD;  Location: [facility] OR [address] [place] [address];  Service: Cardiovascular;  Laterality: N/A;    CREATION OF FEMORAL-TIBIAL ARTERY BYPASS Left 4/29/2021    Procedure: CREATION, BYPASS, ARTERIAL, FEMORAL TO ANTERIOR TIBIAL;  Surgeon: Teddy Huber MD;  Location: [facility] OR [address] [place] [address];  Service: Vascular;  Laterality: Left;    CREATION OF FEMOROPOPLITEAL ARTERIAL BYPASS USING GRAFT Left 8/18/2020    Procedure: CREATION, BYPASS, ARTERIAL, FEMORAL TO POPLITEAL, USING GRAFT, LEFT LOWER EXTREMITY;  Surgeon: Teddy Huber MD;  Location: [facility];  Service: Vascular;  Laterality: Left;  REQUEST 7:15 A.M. START----COVID NEGATIVE ON 8/17  1ST CASE STARTE PER LEANA ON 8/7/2020 @ 942AM-LO  RN PREOP 8/12/2020   T/S-----CLEARED BY CARDS-------PENDING INSURANCE    DEBRIDEMENT OF FOOT Left 9/8/2020    Procedure: DEBRIDEMENT, FOOT;  Surgeon: Rosio Mayes DPM;  Location: [facility];  Service: Podiatry;  Laterality: Left;  request neoxx .   RN  Pre Op 9-4-2020, Covid negative on 9/5/20. C A    DEBRIDEMENT OF FOOT  3/4/2021    Procedure: DEBRIDEMENT, FOOT;  Surgeon: Teddy Huber MD;  Location: [facility] OR;  Service: Vascular;;    DEBRIDEMENT OF FOOT Left 3/9/2021    Procedure: DEBRIDEMENT, FOOT, bone biopsy;  Surgeon: Rosio Mayes DPM;  Location: [facility] OR;  Service: Podiatry;  Laterality: Left;  Request neoxx---COVID IN AM  REQUESTING NOON START  RN Phone Pre op.On Blood thinners Plavix and Eliquis.  Covid am of surgery. C A    DEBRIDEMENT OF FOOT Left 5/4/2021    Procedure: DEBRIDEMENT, FOOT;  Surgeon: Farooq Morley DPM;  [facility];  Service: Podiatry;  Laterality: Left;    INSERTION OF TUNNELED CENTRAL VENOUS HEMODIALYSIS CATHETER N/A 1/27/2020    Procedure: Insertion, Catheter, Central Venous, Hemodialysis;  Surgeon: ESTEBAN Gomez III, MD;  Location: [facility] CATH LAB;  Service: Peripheral Vascular;  Laterality: N/A;    PERCUTANEOUS TRANSLUMINAL ANGIOPLASTY N/A 3/4/2021    Procedure: PTA (ANGIOPLASTY, PERCUTANEOUS, TRANSLUMINAL);  Surgeon: Teddy Huber MD;  Location: [facility] OR;  Service: Vascular;  Laterality: N/A;    REMOVAL OF ARTERIOVENOUS GRAFT Left 5/27/2021    Procedure: REMOVAL, GRAFT, LEFT LOWER EXTREMITY, WOUND EXPLORATION;  Surgeon: Teddy Huber MD;  [facility];  Service: Vascular;  Laterality: Left;    REMOVAL OF NAIL OF DIGIT Left 3/9/2021    Procedure: REMOVAL, NAIL, DIGIT;  Surgeon: Rosio Mayes DPM;  Location: [facility] OR;  Service: Podiatry;  Laterality: Left;    THROMBECTOMY Left 3/4/2021    Procedure: THROMBECTOMY, LEFT LOWER EXTREMITY BYPASS GRAFT, ANGIOGRAM, POSSIBLE INTERVENTION, POSSIBLE LEFT LOWER EXTREMITY BYPASS;  Surgeon: Teddy Huber MD;  Location: [facility] OR;  Service: Vascular;  Laterality: Left;    THROMBECTOMY Left 4/29/2021    Procedure: GRAFT THROMBECTOMY, LEFT LOWER EXTREMITY;  Surgeon: Teddy Huber MD;  [facility];  Service:  Vascular;  Laterality: Left;  14.5 min  1179.85 mGy  341.01 Gycm2  240 ml dye    THROMBECTOMY  10/22/2021    Procedure: THROMBECTOMY;  Surgeon: Saad Arenas MD;  Location: [facility] CATH LAB/EP;  Service: Cardiology;;       Social History     Occupational History    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 PHYSICAL EXAM     Physical Exam  Constitutional:       Appearance: Normal appearance.   HENT:      Head: Atraumatic.   Eyes:      Pupils: Pupils are equal, round, and reactive to light.   Cardiovascular:      Rate and Rhythm: Normal rate.   Pulmonary:      Effort: Pulmonary effort is normal.   Abdominal:      Palpations: Abdomen is soft.   Musculoskeletal:      Cervical back: Normal range of motion.      Comments: S/p R AKA and L AKA  R AKA stump with sutures and stables in place, soft, mildly tender, no breakdown, no drainage.    Neurological:      General: No focal deficit present.           III. ASSESSMENT & PLAN (MEDICAL DECISION MAKING)       Imaging Results: (I have personally reviewed all images and provided interpretation below)         Assessment/Diagnosis and Plan:    1. S/P AKA (above knee amputation) unilateral, right        56 y.o. female s/p R AKA. Staples and sutures removed in clinic today.    - Reinforced need for cleaning wound and showering daily  - Follow up in 2 months for final wound check     DAYNE Florez II, MD, [facility]  Vascular Surgery  [facility] [place]

## 2022-04-25 NOTE — PROGRESS NOTES
3rd Attempt to complete SW Assessment for Outpatient Care Management. No answer and no voicemail. LCSW will close case and notified OPCM RN.

## 2022-04-29 ENCOUNTER — PATIENT OUTREACH (OUTPATIENT)
Dept: ADMINISTRATIVE | Facility: OTHER | Age: 57
End: 2022-04-29
Payer: MEDICARE

## 2022-04-29 DIAGNOSIS — Z12.31 BREAST CANCER SCREENING BY MAMMOGRAM: ICD-10-CM

## 2022-04-29 DIAGNOSIS — Z12.11 SCREENING FOR COLON CANCER: Primary | ICD-10-CM

## 2022-04-29 NOTE — PROGRESS NOTES
Health Maintenance Due   Topic Date Due    Cervical Cancer Screening  Never done    Pneumococcal Vaccines (Age 0-64) (1 - PCV) Never done    Eye Exam  Never done    TETANUS VACCINE  Never done    Sign Pain Contract  Never done    Complete Opioid Risk Tool  Never done    Mammogram  Never done    Shingles Vaccine (1 of 2) Never done    Colorectal Cancer Screening  Never done    Diabetes Urine Screening  01/05/2021    COVID-19 Vaccine (3 - Booster for Pfizer series) 08/30/2021     Updates were requested from care everywhere.  Chart was reviewed for overdue Proactive [facility] Encounters ([facility]AND[facility]) topics (CRS, Breast Cancer Screening, Eye exam)  Health Maintenance has been updated.  LINKS immunization registry triggered.  Immunizations were reconciled.

## 2022-05-04 ENCOUNTER — TELEPHONE (OUTPATIENT)
Dept: VASCULAR SURGERY | Facility: CLINIC | Age: 57
End: 2022-05-04
Payer: MEDICARE

## 2022-05-04 NOTE — TELEPHONE ENCOUNTER
Pt was called 3 times to help  reschedule appt. Pt didn't answer. Voice mail wassent set up.    ----- Message from Hilary Aquino sent at 5/4/2022  8:40 AM CDT -----  Pt would like to be called back regarding rescheduling appts    Pt can be reached at [phone]

## 2022-05-06 DIAGNOSIS — E11.65 TYPE 2 DIABETES MELLITUS WITH HYPERGLYCEMIA, WITH LONG-TERM CURRENT USE OF INSULIN: ICD-10-CM

## 2022-05-06 DIAGNOSIS — Z79.4 TYPE 2 DIABETES MELLITUS WITH HYPERGLYCEMIA, WITH LONG-TERM CURRENT USE OF INSULIN: ICD-10-CM

## 2022-05-06 DIAGNOSIS — E11.65 UNCONTROLLED TYPE 2 DIABETES MELLITUS WITH HYPERGLYCEMIA: ICD-10-CM

## 2022-05-06 RX ORDER — INSULIN ASPART 100 [IU]/ML
INJECTION, SOLUTION INTRAVENOUS; SUBCUTANEOUS
Qty: 15 ML | Refills: 0 | Status: SHIPPED | OUTPATIENT
Start: 2022-05-06 | End: 2022-06-15

## 2022-05-06 NOTE — TELEPHONE ENCOUNTER
----- Message from Mary Mcbride sent at 5/6/2022 12:29 PM CDT -----  Regarding: Prescription  Contact: [phone]  Calling to get a prescription sent to [facility]'s pharmacy for Rx insulin aspart U-100 (NOVOLOG) 100 unit/mL (3 mL) InPn pen. Please call to confirm.    [facility]  [place], [place]  [phone]

## 2022-05-17 DIAGNOSIS — Z79.4 TYPE 2 DIABETES MELLITUS WITH DIABETIC NEPHROPATHY, WITH LONG-TERM CURRENT USE OF INSULIN: ICD-10-CM

## 2022-05-17 DIAGNOSIS — E11.21 TYPE 2 DIABETES MELLITUS WITH DIABETIC NEPHROPATHY, WITH LONG-TERM CURRENT USE OF INSULIN: ICD-10-CM

## 2022-05-17 RX ORDER — BLOOD-GLUCOSE TRANSMITTER
EACH MISCELLANEOUS
Qty: 1 EACH | Refills: 3 | Status: SHIPPED | OUTPATIENT
Start: 2022-05-17 | End: 2023-04-21

## 2022-05-17 RX ORDER — BLOOD-GLUCOSE,RECEIVER,CONT
EACH MISCELLANEOUS
Qty: 1 EACH | Refills: 0 | Status: SHIPPED | OUTPATIENT
Start: 2022-05-17 | End: 2023-04-21

## 2022-05-17 RX ORDER — BLOOD-GLUCOSE SENSOR
EACH MISCELLANEOUS
Qty: 3 EACH | Refills: 11 | Status: SHIPPED | OUTPATIENT
Start: 2022-05-17 | End: 2023-04-21

## 2022-05-21 ENCOUNTER — NURSE TRIAGE (OUTPATIENT)
Dept: ADMINISTRATIVE | Facility: CLINIC | Age: 57
End: 2022-05-21
Payer: MEDICARE

## 2022-05-21 RX ORDER — FUROSEMIDE 40 MG/1
40 TABLET ORAL DAILY
Qty: 30 TABLET | Refills: 0 | Status: SHIPPED | OUTPATIENT
Start: 2022-05-21 | End: 2022-08-19

## 2022-05-21 NOTE — TELEPHONE ENCOUNTER
requesting refill on lasix rx. Pt completely out of medication.     Notified Dr. Hall, on call via phone. Per Nathaniel to escribe 1mth supply to pharmacy & f/u with pcp regarding refills.     Order placed, confirmed receipt received by pharmacy. Pt then updated.     Reason for Disposition   [1] Prescription refill request for ESSENTIAL medicine (i.e., likelihood of harm to patient if not taken) AND [2] triager unable to refill per department policy    Protocols used: MEDICATION REFILL AND RENEWAL CALL-A-AH

## 2022-05-21 NOTE — TELEPHONE ENCOUNTER
F/u call requested regarding rx refill. Unable to reach at number listed for callback.  left with 800# to call if assistance needed.     Reason for Disposition   Message left on unidentified voice mail.  Phone number verified.    Protocols used: NO CONTACT OR DUPLICATE CONTACT CALL-A-ADELAIDA

## 2022-05-30 ENCOUNTER — PATIENT MESSAGE (OUTPATIENT)
Dept: ADMINISTRATIVE | Facility: HOSPITAL | Age: 57
End: 2022-05-30
Payer: MEDICARE

## 2022-06-03 ENCOUNTER — TELEPHONE (OUTPATIENT)
Dept: INTERNAL MEDICINE | Facility: CLINIC | Age: 57
End: 2022-06-03
Payer: MEDICARE

## 2022-06-03 NOTE — TELEPHONE ENCOUNTER
----- Message from Mary Mcbride sent at 6/3/2022  1:16 PM CDT -----  Regarding: Medication review  Contact: [phone]  Calling to speak with provider regarding insulin medication review and refills needed of other medication. Please call and discuss.    [facility] [place] [place] [address]  [address]  [facility] [address]  Phone: [phone] Fax: [phone]

## 2022-07-18 ENCOUNTER — OFFICE VISIT (OUTPATIENT)
Dept: VASCULAR SURGERY | Facility: CLINIC | Age: 57
End: 2022-07-18
Attending: SURGERY
Payer: MEDICARE

## 2022-07-18 VITALS — SYSTOLIC BLOOD PRESSURE: 171 MMHG | DIASTOLIC BLOOD PRESSURE: 67 MMHG | TEMPERATURE: 99 F | HEART RATE: 97 BPM

## 2022-07-18 DIAGNOSIS — Z89.611 S/P AKA (ABOVE KNEE AMPUTATION) UNILATERAL, RIGHT: Primary | ICD-10-CM

## 2022-07-18 PROCEDURE — 1159F PR MEDICATION LIST DOCUMENTED IN MEDICAL RECORD: ICD-10-PCS | Mod: CPTII,S$GLB,, | Performed by: SURGERY

## 2022-07-18 PROCEDURE — 99999 PR PBB SHADOW E&M-EST. PATIENT-LVL III: CPT | Mod: PBBFAC,,, | Performed by: SURGERY

## 2022-07-18 PROCEDURE — 99212 PR OFFICE/OUTPT VISIT, EST, LEVL II, 10-19 MIN: ICD-10-PCS | Mod: S$GLB,,, | Performed by: SURGERY

## 2022-07-18 PROCEDURE — 1159F MED LIST DOCD IN RCRD: CPT | Mod: CPTII,S$GLB,, | Performed by: SURGERY

## 2022-07-18 PROCEDURE — 3077F PR MOST RECENT SYSTOLIC BLOOD PRESSURE >= 140 MM HG: ICD-10-PCS | Mod: CPTII,S$GLB,, | Performed by: SURGERY

## 2022-07-18 PROCEDURE — 4010F PR ACE/ARB THEARPY RXD/TAKEN: ICD-10-PCS | Mod: CPTII,S$GLB,, | Performed by: SURGERY

## 2022-07-18 PROCEDURE — 99999 PR PBB SHADOW E&M-EST. PATIENT-LVL III: ICD-10-PCS | Mod: PBBFAC,,, | Performed by: SURGERY

## 2022-07-18 PROCEDURE — 3044F HG A1C LEVEL LT 7.0%: CPT | Mod: CPTII,S$GLB,, | Performed by: SURGERY

## 2022-07-18 PROCEDURE — 3078F PR MOST RECENT DIASTOLIC BLOOD PRESSURE < 80 MM HG: ICD-10-PCS | Mod: CPTII,S$GLB,, | Performed by: SURGERY

## 2022-07-18 PROCEDURE — 3044F PR MOST RECENT HEMOGLOBIN A1C LEVEL <7.0%: ICD-10-PCS | Mod: CPTII,S$GLB,, | Performed by: SURGERY

## 2022-07-18 PROCEDURE — 4010F ACE/ARB THERAPY RXD/TAKEN: CPT | Mod: CPTII,S$GLB,, | Performed by: SURGERY

## 2022-07-18 PROCEDURE — 3077F SYST BP >= 140 MM HG: CPT | Mod: CPTII,S$GLB,, | Performed by: SURGERY

## 2022-07-18 PROCEDURE — 99212 OFFICE O/P EST SF 10 MIN: CPT | Mod: S$GLB,,, | Performed by: SURGERY

## 2022-07-18 PROCEDURE — 3078F DIAST BP <80 MM HG: CPT | Mod: CPTII,S$GLB,, | Performed by: SURGERY

## 2022-07-18 NOTE — PROGRESS NOTES
VASCULAR SURGERY NOTE    Patient ID: Suyapa Connelly is a 56 y.o. female.    I. HISTORY     Chief Complaint: right AKA    HPI: Suyapa Connelly is a 56 y.o. female who is here today for post-op appointment. PMHx CKD, DM, HTN, HLD, MI, L AKA and recent R BKA with subsequent revision for poor healing ([place]- Jan 2022) both secondary to PAD. admitted to hospital for osteomyelitis of R BKA stump with break down and poor wound healing, now s/p right AKA with me 3/18/22. She returns today for final woundcheck before approval for prosthetic fitting. She does still have some neuropathic pain on the medial and posterior side of her right AKA stump. Otherwise she does not have any complaints. Patient has continued to improve functional capacity significantly. She can care for herself independently in her home getting from bed to commode and prepare food and so forth.      Past Medical History:   Diagnosis Date    Anxiety     Chronic pain syndrome     CKD (chronic kidney disease), stage III     Depression     Diabetes mellitus     type 2    Diabetes mellitus, type 2     GERD (gastroesophageal reflux disease)     Hyperemesis 3/23/2021    Hyperlipemia     Hypertension     Hypokalemia 3/23/2021    Myocardial infarction 2010    minor-caused by stress per pt.    Osteomyelitis     Osteomyelitis of left foot 4/30/2021    PVD (peripheral vascular disease)     Vaginal delivery     x1        Past Surgical History:   Procedure Laterality Date    ABOVE-KNEE AMPUTATION Left 5/18/2021    Procedure: AMPUTATION, ABOVE KNEE;  Surgeon: Teddy Huber MD;  Location: [facility] OR [address] [place] [address];  Service: Vascular;  Laterality: Left;    ABOVE-KNEE AMPUTATION Right 3/18/2022    Procedure: AMPUTATION, ABOVE KNEE;  Surgeon: DAYNE Florez II, MD;  Location: [facility] OR [address] [place] [address];  Service: Vascular;  Laterality: Right;    Angiogram - Right Extremity Right 7/9/15    angiogram-left leg  10/6/15    ANGIOGRAPHY OF LOWER EXTREMITY Left  4/29/2021    Procedure: ANGIOGRAM, LOWER EXTREMITY;  Surgeon: Teddy Huber MD;  Location: [facility] OR 2ND FLR;  Service: Vascular;  Laterality: Left;    BELOW KNEE AMPUTATION OF LOWER EXTREMITY Right 12/28/2021    Procedure: AMPUTATION, BELOW KNEE;  Surgeon: Kaitlyn Rojas MD;  Location: [facility] OR;  Service: General;  Laterality: Right;    CATHETERIZATION OF BOTH LEFT AND RIGHT HEART N/A 12/18/2019    Procedure: CATHETERIZATION, HEART, BOTH LEFT AND RIGHT;  Surgeon: Que Fernando III, MD;  Location: [facility] CATH LAB;  Service: Cardiology;  Laterality: N/A;    CORONARY ANGIOGRAPHY N/A 12/18/2019    Procedure: ANGIOGRAM, CORONARY ARTERY;  Surgeon: Que Fernando III, MD;  Location: [facility] CATH LAB;  Service: Cardiology;  Laterality: N/A;    CORONARY ANGIOGRAPHY INCLUDING BYPASS GRAFTS WITH CATHETERIZATION OF LEFT HEART N/A 7/28/2020    Procedure: ANGIOGRAM, CORONARY, INCLUDING BYPASS GRAFT, WITH LEFT HEART CATHETERIZATION, 9 am;  Surgeon: Rachel Easley MD;  Location: [facility] CATH LAB;  Service: Cardiology;  Laterality: N/A;    CORONARY ARTERY BYPASS GRAFT (CABG) N/A 1/14/2020    Procedure: CORONARY ARTERY BYPASS GRAFT (CABG) x 1     Off Pump;  Surgeon: Huang Altamirano MD;  [facility];  Service: Cardiovascular;  Laterality: N/A;    CREATION OF FEMORAL-TIBIAL ARTERY BYPASS Left 4/29/2021    Procedure: CREATION, BYPASS, ARTERIAL, FEMORAL TO ANTERIOR TIBIAL;  Surgeon: Teddy Huber MD;  [facility];  Service: Vascular;  Laterality: Left;    CREATION OF FEMOROPOPLITEAL ARTERIAL BYPASS USING GRAFT Left 8/18/2020    Procedure: CREATION, BYPASS, ARTERIAL, FEMORAL TO POPLITEAL, USING GRAFT, LEFT LOWER EXTREMITY;  Surgeon: Teddy Huber MD;  Location: [facility];  Service: Vascular;  Laterality: Left;  REQUEST 7:15 A.M. START----COVID NEGATIVE ON 8/17  1ST CASE STARTE PER LEANA ON 8/7/2020 @ 942AM-LO  RN PREOP 8/12/2020   T/S-----CLEARED BY CARDS-------PENDING  INSURANCE    DEBRIDEMENT OF FOOT Left 9/8/2020    Procedure: DEBRIDEMENT, FOOT;  Surgeon: Rosio Mayes DPM;  Location: [facility] OR;  Service: Podiatry;  Laterality: Left;  request neoxx .   RN Pre Op 9-4-2020, Covid negative on 9/5/20. C A    DEBRIDEMENT OF FOOT  3/4/2021    Procedure: DEBRIDEMENT, FOOT;  Surgeon: Teddy Huber MD;  Location: [facility] OR;  Service: Vascular;;    DEBRIDEMENT OF FOOT Left 3/9/2021    Procedure: DEBRIDEMENT, FOOT, bone biopsy;  Surgeon: Rosio Mayes DPM;  Location: [facility] OR;  Service: Podiatry;  Laterality: Left;  Request neoxx---COVID IN AM  REQUESTING NOON START  RN Phone Pre op.On Blood thinners Plavix and Eliquis.  Covid am of surgery. C A    DEBRIDEMENT OF FOOT Left 5/4/2021    Procedure: DEBRIDEMENT, FOOT;  Surgeon: Farooq Morley DPM;  Location: [facility] OR 2ND FLR;  Service: Podiatry;  Laterality: Left;    INSERTION OF TUNNELED CENTRAL VENOUS HEMODIALYSIS CATHETER N/A 1/27/2020    Procedure: Insertion, Catheter, Central Venous, Hemodialysis;  Surgeon: ESTEBAN Gomez III, MD;  Location: [facility] CATH LAB;  Service: Peripheral Vascular;  Laterality: N/A;    PERCUTANEOUS TRANSLUMINAL ANGIOPLASTY N/A 3/4/2021    Procedure: PTA (ANGIOPLASTY, PERCUTANEOUS, TRANSLUMINAL);  Surgeon: Teddy Huber MD;  Location: [facility] OR;  Service: Vascular;  Laterality: N/A;    REMOVAL OF ARTERIOVENOUS GRAFT Left 5/27/2021    Procedure: REMOVAL, GRAFT, LEFT LOWER EXTREMITY, WOUND EXPLORATION;  Surgeon: Teddy Huber MD;  Location: [facility] OR 2ND FLR;  Service: Vascular;  Laterality: Left;    REMOVAL OF NAIL OF DIGIT Left 3/9/2021    Procedure: REMOVAL, NAIL, DIGIT;  Surgeon: Rosio Mayes DPM;  Location: [facility] OR;  Service: Podiatry;  Laterality: Left;    THROMBECTOMY Left 3/4/2021    Procedure: THROMBECTOMY, LEFT LOWER EXTREMITY BYPASS GRAFT, ANGIOGRAM, POSSIBLE INTERVENTION, POSSIBLE LEFT LOWER EXTREMITY BYPASS;  Surgeon: Teddy Huber MD;  Location: [facility] OR;  Service:  Vascular;  Laterality: Left;    THROMBECTOMY Left 4/29/2021    Procedure: GRAFT THROMBECTOMY, LEFT LOWER EXTREMITY;  Surgeon: Teddy Huber MD;  Location: [facility] OR [address] [place] [address];  Service: Vascular;  Laterality: Left;  14.5 min  1179.85 mGy  341.01 Gycm2  240 ml dye    THROMBECTOMY  10/22/2021    Procedure: THROMBECTOMY;  Surgeon: Saad Arenas MD;  Location: [facility] CATH LAB/EP;  Service: Cardiology;;       Social History     Occupational History    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   HENT: Negative for ear pain and nosebleeds.    Eyes: Negative for discharge and pain.   Cardiovascular: Negative for chest pain and palpitations.   Respiratory: Negative for cough, shortness of breath and wheezing.    Endocrine: Negative for cold intolerance, heat intolerance and polyphagia.   Hematologic/Lymphatic: Negative for adenopathy. Does not bruise/bleed easily.   Skin: Negative for itching and rash.   Musculoskeletal: Negative for joint swelling and muscle cramps.   Gastrointestinal: Negative for abdominal pain, diarrhea, nausea and vomiting.   Genitourinary: Negative for dysuria and flank pain.   Neurological: Negative for numbness and seizures.         II. PHYSICAL EXAM     Physical Exam  Constitutional:       General: She is not in acute distress.     Appearance: Normal appearance. She is not ill-appearing or diaphoretic.   HENT:      Head: Normocephalic and atraumatic.   Eyes:      General: No scleral icterus.        Right eye: No discharge.         Left eye: No discharge.      Extraocular Movements: Extraocular movements intact.      Conjunctiva/sclera: Conjunctivae normal.   Cardiovascular:      Pulses:  Normal pulses.   Pulmonary:      Effort: Pulmonary effort is normal. No respiratory distress.   Musculoskeletal:         General: Normal range of motion.      Comments: Bilateral AKA stump scars well-healed, stumps equal in length, warm, minimal edema   Skin:     General: Skin is warm and dry.      Coloration: Skin is not jaundiced or pale.      Findings: No erythema or rash.   Neurological:      General: No focal deficit present.      Mental Status: She is alert and oriented to person, place, and time.   Psychiatric:         Mood and Affect: Mood normal.         Behavior: Behavior normal.            III. ASSESSMENT & PLAN (MEDICAL DECISION MAKING)       Imaging Results: (I have personally reviewed all images and provided interpretation below)         Assessment/Diagnosis and Plan:    1. S/P AKA (above knee amputation) unilateral, right        56 y.o. female s/p R AKA. Ok to apply stump  to bilateral AKA stumps to prepare for prosthetics.    -For neuropathic stump pain she already takes cabapentin. Recommend f/u with PCP Dr. Christensen for alternative options (lyrica) and/or pain management    -Apply AKA stump shrinkers to prepare for prosthetic fitting    DAYNE Florez II, MD, [facility]  Vascular Surgery  [facility] [place]

## 2022-08-11 PROBLEM — R41.0 CONFUSION: Status: ACTIVE | Noted: 2022-08-11

## 2022-08-11 PROBLEM — R53.81 DEBILITY: Status: ACTIVE | Noted: 2022-02-12

## 2022-08-13 ENCOUNTER — HOSPITAL ENCOUNTER (INPATIENT)
Facility: HOSPITAL | Age: 57
LOS: 5 days | Discharge: HOME HEALTH | DRG: 871 | End: 2022-08-18
Attending: EMERGENCY MEDICINE | Admitting: EMERGENCY MEDICINE
Payer: MEDICARE

## 2022-08-13 DIAGNOSIS — Z45.2 ENCOUNTER FOR CENTRAL LINE PLACEMENT: ICD-10-CM

## 2022-08-13 DIAGNOSIS — A41.9 SEVERE SEPSIS: ICD-10-CM

## 2022-08-13 DIAGNOSIS — R65.20 SEVERE SEPSIS: ICD-10-CM

## 2022-08-13 DIAGNOSIS — R41.0 CONFUSION: ICD-10-CM

## 2022-08-13 DIAGNOSIS — R07.9 CHEST PAIN: ICD-10-CM

## 2022-08-13 DIAGNOSIS — F05 CONFUSION AFTER A SEIZURE: ICD-10-CM

## 2022-08-13 PROBLEM — G91.0 COMMUNICATING HYDROCEPHALUS: Status: ACTIVE | Noted: 2022-08-13

## 2022-08-13 LAB
ALBUMIN SERPL BCP-MCNC: 2.1 G/DL (ref 3.5–5.2)
ALP SERPL-CCNC: 91 U/L (ref 55–135)
ALT SERPL W/O P-5'-P-CCNC: 7 U/L (ref 10–44)
ANION GAP SERPL CALC-SCNC: 11 MMOL/L (ref 8–16)
AST SERPL-CCNC: 10 U/L (ref 10–40)
BACTERIA #/AREA URNS HPF: ABNORMAL /HPF
BASOPHILS # BLD AUTO: 0.04 K/UL (ref 0–0.2)
BASOPHILS NFR BLD: 0.4 % (ref 0–1.9)
BILIRUB SERPL-MCNC: 0.3 MG/DL (ref 0.1–1)
BILIRUB UR QL STRIP: ABNORMAL
BUN SERPL-MCNC: 42 MG/DL (ref 6–20)
CA-I BLDV-SCNC: 1.16 MMOL/L (ref 1.06–1.42)
CALCIUM SERPL-MCNC: 8.3 MG/DL (ref 8.7–10.5)
CHLORIDE SERPL-SCNC: 104 MMOL/L (ref 95–110)
CLARITY UR: ABNORMAL
CO2 SERPL-SCNC: 17 MMOL/L (ref 23–29)
COLOR UR: YELLOW
CREAT SERPL-MCNC: 3.3 MG/DL (ref 0.5–1.4)
DIFFERENTIAL METHOD: ABNORMAL
EOSINOPHIL # BLD AUTO: 0.1 K/UL (ref 0–0.5)
EOSINOPHIL NFR BLD: 1.1 % (ref 0–8)
ERYTHROCYTE [DISTWIDTH] IN BLOOD BY AUTOMATED COUNT: 13.7 % (ref 11.5–14.5)
EST. GFR  (NO RACE VARIABLE): 16 ML/MIN/1.73 M^2
GLUCOSE SERPL-MCNC: 111 MG/DL (ref 70–110)
GLUCOSE UR QL STRIP: NEGATIVE
HCT VFR BLD AUTO: 30.5 % (ref 37–48.5)
HGB BLD-MCNC: 10.5 G/DL (ref 12–16)
HGB UR QL STRIP: ABNORMAL
HYALINE CASTS #/AREA URNS LPF: 0 /LPF
IMM GRANULOCYTES # BLD AUTO: 0.07 K/UL (ref 0–0.04)
IMM GRANULOCYTES NFR BLD AUTO: 0.7 % (ref 0–0.5)
KETONES UR QL STRIP: NEGATIVE
LACTATE SERPL-SCNC: 1.2 MMOL/L (ref 0.5–2.2)
LACTATE SERPL-SCNC: 1.4 MMOL/L (ref 0.5–2.2)
LEUKOCYTE ESTERASE UR QL STRIP: ABNORMAL
LYMPHOCYTES # BLD AUTO: 1.1 K/UL (ref 1–4.8)
LYMPHOCYTES NFR BLD: 10.1 % (ref 18–48)
MCH RBC QN AUTO: 30.3 PG (ref 27–31)
MCHC RBC AUTO-ENTMCNC: 34.4 G/DL (ref 32–36)
MCV RBC AUTO: 88 FL (ref 82–98)
MICROSCOPIC COMMENT: ABNORMAL
MONOCYTES # BLD AUTO: 0.8 K/UL (ref 0.3–1)
MONOCYTES NFR BLD: 7.6 % (ref 4–15)
NEUTROPHILS # BLD AUTO: 8.5 K/UL (ref 1.8–7.7)
NEUTROPHILS NFR BLD: 80.1 % (ref 38–73)
NITRITE UR QL STRIP: NEGATIVE
NRBC BLD-RTO: 0 /100 WBC
PH UR STRIP: 6 [PH] (ref 5–8)
PLATELET # BLD AUTO: 307 K/UL (ref 150–450)
PMV BLD AUTO: 11.2 FL (ref 9.2–12.9)
POCT GLUCOSE: 135 MG/DL (ref 70–110)
POCT GLUCOSE: 253 MG/DL (ref 70–110)
POTASSIUM SERPL-SCNC: 4 MMOL/L (ref 3.5–5.1)
PROT SERPL-MCNC: 5.9 G/DL (ref 6–8.4)
PROT UR QL STRIP: ABNORMAL
RBC # BLD AUTO: 3.46 M/UL (ref 4–5.4)
RBC #/AREA URNS HPF: 12 /HPF (ref 0–4)
SODIUM SERPL-SCNC: 132 MMOL/L (ref 136–145)
SP GR UR STRIP: 1.02 (ref 1–1.03)
SQUAMOUS #/AREA URNS HPF: 1 /HPF
TSH SERPL DL<=0.005 MIU/L-ACNC: 0.89 UIU/ML (ref 0.4–4)
URN SPEC COLLECT METH UR: ABNORMAL
UROBILINOGEN UR STRIP-ACNC: NEGATIVE EU/DL
WBC # BLD AUTO: 10.64 K/UL (ref 3.9–12.7)
WBC #/AREA URNS HPF: >100 /HPF (ref 0–5)
YEAST URNS QL MICRO: ABNORMAL

## 2022-08-13 PROCEDURE — 83930 ASSAY OF BLOOD OSMOLALITY: CPT | Performed by: NURSE PRACTITIONER

## 2022-08-13 PROCEDURE — 83605 ASSAY OF LACTIC ACID: CPT | Performed by: NURSE PRACTITIONER

## 2022-08-13 PROCEDURE — 36415 COLL VENOUS BLD VENIPUNCTURE: CPT | Performed by: EMERGENCY MEDICINE

## 2022-08-13 PROCEDURE — 87086 URINE CULTURE/COLONY COUNT: CPT | Performed by: EMERGENCY MEDICINE

## 2022-08-13 PROCEDURE — 85025 COMPLETE CBC W/AUTO DIFF WBC: CPT | Performed by: EMERGENCY MEDICINE

## 2022-08-13 PROCEDURE — 82330 ASSAY OF CALCIUM: CPT | Performed by: NURSE PRACTITIONER

## 2022-08-13 PROCEDURE — 81000 URINALYSIS NONAUTO W/SCOPE: CPT | Performed by: EMERGENCY MEDICINE

## 2022-08-13 PROCEDURE — 83605 ASSAY OF LACTIC ACID: CPT | Mod: 91 | Performed by: EMERGENCY MEDICINE

## 2022-08-13 PROCEDURE — 84443 ASSAY THYROID STIM HORMONE: CPT | Performed by: NURSE PRACTITIONER

## 2022-08-13 PROCEDURE — 80053 COMPREHEN METABOLIC PANEL: CPT | Performed by: EMERGENCY MEDICINE

## 2022-08-13 PROCEDURE — 25000003 PHARM REV CODE 250: Performed by: NURSE PRACTITIONER

## 2022-08-13 PROCEDURE — 36415 COLL VENOUS BLD VENIPUNCTURE: CPT | Performed by: NURSE PRACTITIONER

## 2022-08-13 PROCEDURE — 12000002 HC ACUTE/MED SURGE SEMI-PRIVATE ROOM

## 2022-08-13 PROCEDURE — 63600175 PHARM REV CODE 636 W HCPCS: Performed by: EMERGENCY MEDICINE

## 2022-08-13 PROCEDURE — 25000003 PHARM REV CODE 250: Performed by: EMERGENCY MEDICINE

## 2022-08-13 RX ORDER — SODIUM,POTASSIUM PHOSPHATES 280-250MG
2 POWDER IN PACKET (EA) ORAL
Status: DISCONTINUED | OUTPATIENT
Start: 2022-08-13 | End: 2022-08-18 | Stop reason: HOSPADM

## 2022-08-13 RX ORDER — INSULIN GLARGINE 100 [IU]/ML
8 INJECTION, SOLUTION SUBCUTANEOUS NIGHTLY
Status: ON HOLD | COMMUNITY
End: 2023-05-02 | Stop reason: CLARIF

## 2022-08-13 RX ORDER — SODIUM CHLORIDE 9 MG/ML
INJECTION, SOLUTION INTRAVENOUS CONTINUOUS
Status: DISCONTINUED | OUTPATIENT
Start: 2022-08-13 | End: 2022-08-18

## 2022-08-13 RX ORDER — ASPIRIN 81 MG/1
81 TABLET ORAL DAILY
Status: ON HOLD | COMMUNITY
End: 2023-05-02 | Stop reason: HOSPADM

## 2022-08-13 RX ORDER — IBUPROFEN 200 MG
16 TABLET ORAL
Status: DISCONTINUED | OUTPATIENT
Start: 2022-08-13 | End: 2022-08-18 | Stop reason: HOSPADM

## 2022-08-13 RX ORDER — INSULIN ASPART 100 [IU]/ML
1-10 INJECTION, SOLUTION INTRAVENOUS; SUBCUTANEOUS
Status: DISCONTINUED | OUTPATIENT
Start: 2022-08-14 | End: 2022-08-13

## 2022-08-13 RX ORDER — LANOLIN ALCOHOL/MO/W.PET/CERES
800 CREAM (GRAM) TOPICAL
Status: DISCONTINUED | OUTPATIENT
Start: 2022-08-13 | End: 2022-08-18 | Stop reason: HOSPADM

## 2022-08-13 RX ORDER — HEPARIN SODIUM 5000 [USP'U]/ML
5000 INJECTION, SOLUTION INTRAVENOUS; SUBCUTANEOUS EVERY 8 HOURS
Status: DISCONTINUED | OUTPATIENT
Start: 2022-08-13 | End: 2022-08-14

## 2022-08-13 RX ORDER — IBUPROFEN 200 MG
24 TABLET ORAL
Status: DISCONTINUED | OUTPATIENT
Start: 2022-08-13 | End: 2022-08-18 | Stop reason: HOSPADM

## 2022-08-13 RX ORDER — TALC
9 POWDER (GRAM) TOPICAL NIGHTLY PRN
Status: DISCONTINUED | OUTPATIENT
Start: 2022-08-13 | End: 2022-08-18 | Stop reason: HOSPADM

## 2022-08-13 RX ORDER — SIMETHICONE 80 MG
1 TABLET,CHEWABLE ORAL 4 TIMES DAILY PRN
Status: DISCONTINUED | OUTPATIENT
Start: 2022-08-13 | End: 2022-08-18 | Stop reason: HOSPADM

## 2022-08-13 RX ORDER — SODIUM CHLORIDE 0.9 % (FLUSH) 0.9 %
3 SYRINGE (ML) INJECTION EVERY 12 HOURS PRN
Status: DISCONTINUED | OUTPATIENT
Start: 2022-08-13 | End: 2022-08-18 | Stop reason: HOSPADM

## 2022-08-13 RX ORDER — MAG HYDROX/ALUMINUM HYD/SIMETH 200-200-20
30 SUSPENSION, ORAL (FINAL DOSE FORM) ORAL 4 TIMES DAILY PRN
Status: DISCONTINUED | OUTPATIENT
Start: 2022-08-13 | End: 2022-08-18 | Stop reason: HOSPADM

## 2022-08-13 RX ORDER — GLUCAGON 1 MG
1 KIT INJECTION
Status: DISCONTINUED | OUTPATIENT
Start: 2022-08-13 | End: 2022-08-13

## 2022-08-13 RX ORDER — ACETAMINOPHEN 325 MG/1
650 TABLET ORAL EVERY 4 HOURS PRN
Status: DISCONTINUED | OUTPATIENT
Start: 2022-08-13 | End: 2022-08-18 | Stop reason: HOSPADM

## 2022-08-13 RX ORDER — IBUPROFEN 200 MG
24 TABLET ORAL
Status: DISCONTINUED | OUTPATIENT
Start: 2022-08-14 | End: 2022-08-13

## 2022-08-13 RX ORDER — IBUPROFEN 200 MG
16 TABLET ORAL
Status: DISCONTINUED | OUTPATIENT
Start: 2022-08-14 | End: 2022-08-13

## 2022-08-13 RX ORDER — GLUCAGON 1 MG
1 KIT INJECTION
Status: DISCONTINUED | OUTPATIENT
Start: 2022-08-14 | End: 2022-08-13

## 2022-08-13 RX ORDER — NALOXONE HCL 0.4 MG/ML
0.02 VIAL (ML) INJECTION
Status: DISCONTINUED | OUTPATIENT
Start: 2022-08-13 | End: 2022-08-18 | Stop reason: HOSPADM

## 2022-08-13 RX ORDER — SODIUM CHLORIDE 9 MG/ML
INJECTION, SOLUTION INTRAVENOUS
Status: COMPLETED | OUTPATIENT
Start: 2022-08-13 | End: 2022-08-13

## 2022-08-13 RX ORDER — POLYETHYLENE GLYCOL 3350 17 G/17G
17 POWDER, FOR SOLUTION ORAL DAILY
Status: DISCONTINUED | OUTPATIENT
Start: 2022-08-14 | End: 2022-08-18 | Stop reason: HOSPADM

## 2022-08-13 RX ORDER — ACETAMINOPHEN 325 MG/1
650 TABLET ORAL EVERY 8 HOURS PRN
Status: DISCONTINUED | OUTPATIENT
Start: 2022-08-13 | End: 2022-08-18 | Stop reason: HOSPADM

## 2022-08-13 RX ORDER — ONDANSETRON 2 MG/ML
8 INJECTION INTRAMUSCULAR; INTRAVENOUS EVERY 6 HOURS PRN
Status: DISCONTINUED | OUTPATIENT
Start: 2022-08-13 | End: 2022-08-18 | Stop reason: HOSPADM

## 2022-08-13 RX ADMIN — PIPERACILLIN AND TAZOBACTAM 2.25 G: 2; .25 INJECTION, POWDER, LYOPHILIZED, FOR SOLUTION INTRAVENOUS; PARENTERAL at 09:08

## 2022-08-13 RX ADMIN — SODIUM CHLORIDE: 0.9 INJECTION, SOLUTION INTRAVENOUS at 11:08

## 2022-08-13 RX ADMIN — SODIUM CHLORIDE: 0.9 INJECTION, SOLUTION INTRAVENOUS at 05:08

## 2022-08-13 RX ADMIN — SODIUM CHLORIDE, SODIUM LACTATE, POTASSIUM CHLORIDE, AND CALCIUM CHLORIDE 1000 ML: .6; .31; .03; .02 INJECTION, SOLUTION INTRAVENOUS at 08:08

## 2022-08-13 NOTE — ED PROVIDER NOTES
Encounter Date: 8/13/2022    SCRIBE #1 NOTE: I, Millie Gil, am scribing for, and in the presence of, Max Curtis MD.       History     Chief Complaint   Patient presents with    Fatigue     Pt lethargic not answering question. Sternal rub done pt response.     Time seen by provider: 5:30 PM on 08/13/2022    Suyapa Connelly is a 56 y.o. female who presents to the ED with an onset of fatigue and confusion and is unresponsive upon arrival. The patient had a blood pressure of 97/41 in the ED. The  states that patient was having multiple episodes of vomiting starting 9 days ago as she has had episodically in the past on a chronic basis. This stopped 5 days ago, but patient began having confusion with difficulty speaking until ultimately becoming nonverbal. She was brought to the ED by her  on 08/10 and left AMA on 08/11. The  states they had a bad visit and he was struggling to manage taking care of both his mother and his wife. Although the patient was not at 100%, she was responsive and doing better so the  took her home where he felt she would receive better care. He brought her back today because she began drooling and became unconscious again pta. The patient's  denies any other symptoms at this time. The patient has a PMHx of GERD, HTN, HLD, diabetes, MI, chronic pain syndrome, osteomyelitis, PVD, CKD, hypokalemia, and hyperemesis. The patient has a PSHx of angiogram, heart catheterization, coronary angiography, CABG, insertion of tunneled central venous hemodialysis catheter, thrombectomy, percutaneous transluminal angioplasty, and AKA.      The history is provided by the spouse.     Review of patient's allergies indicates:   Allergen Reactions    Ciprofloxacin Itching    Contrast media      Kidney injury    Iodine      Kidney injury     Past Medical History:   Diagnosis Date    Anxiety     Chronic pain syndrome     CKD (chronic kidney disease), stage III      Depression     Diabetes mellitus     type 2    Diabetes mellitus, type 2     GERD (gastroesophageal reflux disease)     Hyperemesis 3/23/2021    Hyperlipemia     Hypertension     Hypokalemia 3/23/2021    Myocardial infarction 2010    minor-caused by stress per pt.    Osteomyelitis     Osteomyelitis of left foot 4/30/2021    PVD (peripheral vascular disease)     Vaginal delivery     x1     Past Surgical History:   Procedure Laterality Date    ABOVE-KNEE AMPUTATION Left 5/18/2021    Procedure: AMPUTATION, ABOVE KNEE;  Surgeon: Teddy Huber MD;  [facility];  Service: Vascular;  Laterality: Left;    ABOVE-KNEE AMPUTATION Right 3/18/2022    Procedure: AMPUTATION, ABOVE KNEE;  Surgeon: DAYNE Florez II, MD;  Location: [facility] OR [address] [place] [address];  Service: Vascular;  Laterality: Right;    Angiogram - Right Extremity Right 7/9/15    angiogram-left leg  10/6/15    ANGIOGRAPHY OF LOWER EXTREMITY Left 4/29/2021    Procedure: ANGIOGRAM, LOWER EXTREMITY;  Surgeon: Teddy Huber MD;  Location: [facility] OR [address] [place] [address];  Service: Vascular;  Laterality: Left;    BELOW KNEE AMPUTATION OF LOWER EXTREMITY Right 12/28/2021    Procedure: AMPUTATION, BELOW KNEE;  Surgeon: Kaitlyn Rojas MD;  Location: [facility] OR;  Service: General;  Laterality: Right;    CATHETERIZATION OF BOTH LEFT AND RIGHT HEART N/A 12/18/2019    Procedure: CATHETERIZATION, HEART, BOTH LEFT AND RIGHT;  Surgeon: Que Fernando III, MD;  Location: [facility] CATH LAB;  Service: Cardiology;  Laterality: N/A;    CORONARY ANGIOGRAPHY N/A 12/18/2019    Procedure: ANGIOGRAM, CORONARY ARTERY;  Surgeon: Que Fernando III, MD;  Location: [facility] CATH LAB;  Service: Cardiology;  Laterality: N/A;    CORONARY ANGIOGRAPHY INCLUDING BYPASS GRAFTS WITH CATHETERIZATION OF LEFT HEART N/A 7/28/2020    Procedure: ANGIOGRAM, CORONARY, INCLUDING BYPASS GRAFT, WITH LEFT HEART CATHETERIZATION, 9 am;  Surgeon: Rachel Easley MD;  Location: [facility] CATH  LAB;  Service: Cardiology;  Laterality: N/A;    CORONARY ARTERY BYPASS GRAFT (CABG) N/A 1/14/2020    Procedure: CORONARY ARTERY BYPASS GRAFT (CABG) x 1     Off Pump;  Surgeon: Huang Altamirano MD;  Location: [facility] OR [address] [place] [address];  Service: Cardiovascular;  Laterality: N/A;    CREATION OF FEMORAL-TIBIAL ARTERY BYPASS Left 4/29/2021    Procedure: CREATION, BYPASS, ARTERIAL, FEMORAL TO ANTERIOR TIBIAL;  Surgeon: Teddy Huber MD;  [facility];  Service: Vascular;  Laterality: Left;    CREATION OF FEMOROPOPLITEAL ARTERIAL BYPASS USING GRAFT Left 8/18/2020    Procedure: CREATION, BYPASS, ARTERIAL, FEMORAL TO POPLITEAL, USING GRAFT, LEFT LOWER EXTREMITY;  Surgeon: Teddy Huber MD;  Location: [facility] OR;  Service: Vascular;  Laterality: Left;  REQUEST 7:15 A.M. START----COVID NEGATIVE ON 8/17 1ST CASE STARTKENYA PER LEANA ON 8/7/2020 @ 942AM-  RN PREOP 8/12/2020   T/S-----CLEARED BY CARDS-------PENDING INSURANCE    DEBRIDEMENT OF FOOT Left 9/8/2020    Procedure: DEBRIDEMENT, FOOT;  Surgeon: Rosio Mayes DPM;  Location: [facility] OR;  Service: Podiatry;  Laterality: Left;  request neoxx .   RN Pre Op 9-4-2020, Covid negative on 9/5/20. C A    DEBRIDEMENT OF FOOT  3/4/2021    Procedure: DEBRIDEMENT, FOOT;  Surgeon: Teddy Huber MD;  Location: [facility] OR;  Service: Vascular;;    DEBRIDEMENT OF FOOT Left 3/9/2021    Procedure: DEBRIDEMENT, FOOT, bone biopsy;  Surgeon: Rosio Mayes DPM;  Location: [facility] OR;  Service: Podiatry;  Laterality: Left;  Request neoxx---COVID IN AM  REQUESTING NOON START  RN Phone Pre op.On Blood thinners Plavix and Eliquis.  Covid am of surgery. C A    DEBRIDEMENT OF FOOT Left 5/4/2021    Procedure: DEBRIDEMENT, FOOT;  Surgeon: Farooq Morley DPM;  [facility];  Service: Podiatry;  Laterality: Left;    INSERTION OF TUNNELED CENTRAL VENOUS HEMODIALYSIS CATHETER N/A 1/27/2020    Procedure: Insertion, Catheter, Central Venous, Hemodialysis;   Surgeon: ESTEBAN Gomez III, MD;  Location: [facility] CATH LAB;  Service: Peripheral Vascular;  Laterality: N/A;    PERCUTANEOUS TRANSLUMINAL ANGIOPLASTY N/A 3/4/2021    Procedure: PTA (ANGIOPLASTY, PERCUTANEOUS, TRANSLUMINAL);  Surgeon: Teddy Huber MD;  Location: [facility] OR;  Service: Vascular;  Laterality: N/A;    REMOVAL OF ARTERIOVENOUS GRAFT Left 5/27/2021    Procedure: REMOVAL, GRAFT, LEFT LOWER EXTREMITY, WOUND EXPLORATION;  Surgeon: Teddy Huber MD;  Location: [facility] OR 2ND FLR;  Service: Vascular;  Laterality: Left;    REMOVAL OF NAIL OF DIGIT Left 3/9/2021    Procedure: REMOVAL, NAIL, DIGIT;  Surgeon: Rosio Mayes DPM;  Location: [facility] OR;  Service: Podiatry;  Laterality: Left;    THROMBECTOMY Left 3/4/2021    Procedure: THROMBECTOMY, LEFT LOWER EXTREMITY BYPASS GRAFT, ANGIOGRAM, POSSIBLE INTERVENTION, POSSIBLE LEFT LOWER EXTREMITY BYPASS;  Surgeon: Teddy Huber MD;  Location: [facility] OR;  Service: Vascular;  Laterality: Left;    THROMBECTOMY Left 4/29/2021    Procedure: GRAFT THROMBECTOMY, LEFT LOWER EXTREMITY;  Surgeon: Teddy Huber MD;  Location: [facility] OR 2ND FLR;  Service: Vascular;  Laterality: Left;  14.5 min  1179.85 mGy  341.01 Gycm2  240 ml dye    THROMBECTOMY  10/22/2021    Procedure: THROMBECTOMY;  Surgeon: Saad Arenas MD;  Location: [facility] CATH LAB/EP;  Service: Cardiology;;     Family History   Problem Relation Age of Onset    Diabetes Mother     Diabetes Father     Heart disease Maternal Grandmother     No Known Problems Maternal Grandfather     Diabetes Paternal Grandmother     No Known Problems Paternal Grandfather     Anesthesia problems Neg Hx      Social History     Tobacco Use    Smoking status: Former Smoker    Smokeless tobacco: Never Used   Substance Use Topics    Alcohol use: No    Drug use: Yes     Types: Marijuana     Comment: occassional     Review of Systems   Constitutional: Positive for fatigue. Negative for fever.   Respiratory:  Negative for shortness of breath.    Genitourinary: Negative for flank pain.   Musculoskeletal: Negative for gait problem.   Neurological: Positive for syncope. Negative for weakness.   Psychiatric/Behavioral: Positive for confusion.       Physical Exam     Initial Vitals   BP Pulse Resp Temp SpO2   08/13/22 1738 08/13/22 1736 08/13/22 1736 08/13/22 1736 08/13/22 1738   (!) 97/41 81 16 98.1 °F (36.7 °C) 100 %      MAP       --                Physical Exam    Nursing note and vitals reviewed.  Constitutional: She appears well-developed and well-nourished.   HENT:   Head: Normocephalic and atraumatic.   Eyes: Conjunctivae are normal.   Neck: Neck supple.   Normal range of motion.  Cardiovascular: Normal rate, regular rhythm and normal heart sounds. Exam reveals no gallop and no friction rub.    No murmur heard.  Pulmonary/Chest: Effort normal and breath sounds normal. No respiratory distress. She has no wheezes. She has no rhonchi. She has no rales.   Abdominal: Abdomen is soft. She exhibits no distension. There is no abdominal tenderness.   Musculoskeletal:         General: Normal range of motion.      Cervical back: Normal range of motion and neck supple.     Neurological: She is alert.   The patient is only oriented to person.   Skin: Skin is warm and dry. No erythema.   Psychiatric: She has a normal mood and affect.         ED Course   Central Line    Date/Time: 8/13/2022 8:36 PM  Performed by: Max Curtis III, MD  Authorized by: Max Curtis III, MD     Location procedure was performed:  [facility] EMERGENCY DEPARTMENT  Indications:  Med administration  Anesthesia:  Local infiltration  Local anesthetic:  Lidocaine 1% without epinephrine  Anesthetic total (ml):  3  Preparation:  Skin prepped with ChloraPrep  Skin prep agent dried: Skin prep agent completely dried prior to procedure    Sterile barriers: All five maximal sterile barriers used - gloves, gown, cap, mask and large sterile sheet    Hand hygiene: Hand  hygiene performed immediately prior to central venous catheter insertion    Location:  Left internal jugular  Catheter type:  Triple lumen  Catheter size:  8.5 Fr  Ultrasound guidance: Yes    Vessel Caliber:  Medium   patent  Comprressibility:  Normal  Needle advanced into vessel with real time ultrasound guidance.    Steril sheath on probe.    Sterile gel used.  Manometry: No    Number of attempts:  1  Securement:  Line sutured, chlorhexidine patch, sterile dressing applied and blood return through all ports  XRay:  Placement verified by x-ray  Adverse Events:  None  Critical Care    Date/Time: 8/13/2022 8:38 PM  Performed by: Max Curtis III, MD  Authorized by: Max Curtis III, MD   Direct patient critical care time: 120 minutes  Total critical care time (exclusive of procedural time) : 120 minutes  Critical care was necessary to treat or prevent imminent or life-threatening deterioration of the following conditions: CNS failure or compromise and sepsis.  Critical care was time spent personally by me on the following activities: review of old charts, pulse oximetry, ordering and review of laboratory studies, obtaining history from patient or surrogate, evaluation of patient's response to treatment, development of treatment plan with patient or surrogate, discussions with primary provider, examination of patient, ordering and performing treatments and interventions, ordering and review of radiographic studies and re-evaluation of patient's condition.  Subsequent provider of critical care: I assumed direction of critical care for this patient from another provider of my specialty.        Labs Reviewed   CBC W/ AUTO DIFFERENTIAL - Abnormal; Notable for the following components:       Result Value    RBC 3.46 (*)     Hemoglobin 10.5 (*)     Hematocrit 30.5 (*)     Immature Granulocytes 0.7 (*)     Gran # (ANC) 8.5 (*)     Immature Grans (Abs) 0.07 (*)     Gran % 80.1 (*)     Lymph % 10.1 (*)     All other  components within normal limits   COMPREHENSIVE METABOLIC PANEL - Abnormal; Notable for the following components:    Sodium 132 (*)     CO2 17 (*)     Glucose 111 (*)     BUN 42 (*)     Creatinine 3.3 (*)     Calcium 8.3 (*)     Total Protein 5.9 (*)     Albumin 2.1 (*)     ALT 7 (*)     eGFR 16 (*)     All other components within normal limits   URINALYSIS, REFLEX TO URINE CULTURE - Abnormal; Notable for the following components:    Appearance, UA Cloudy (*)     Protein, UA 2+ (*)     Bilirubin (UA) 1+ (*)     Occult Blood UA 3+ (*)     Leukocytes, UA 3+ (*)     All other components within normal limits    Narrative:     Specimen Source->Urine   URINALYSIS MICROSCOPIC - Abnormal; Notable for the following components:    RBC, UA 12 (*)     WBC, UA >100 (*)     Bacteria Many (*)     Yeast, UA Many (*)     All other components within normal limits    Narrative:     Specimen Source->Urine   POCT GLUCOSE - Abnormal; Notable for the following components:    POCT Glucose 135 (*)     All other components within normal limits   CULTURE, URINE   LACTIC ACID, PLASMA   TSH   LACTIC ACID, PLASMA   CALCIUM, IONIZED   OSMOLALITY, SERUM          Imaging Results          X-Ray Chest AP Portable (Final result)  Result time 08/13/22 21:09:14    Final result by Rizwan Valdez DO (08/13/22 21:09:14)                 Impression:      Left internal jugular central venous catheter placement.      Electronically signed by: Rizwan Valdez  Date:    08/13/2022  Time:    21:09             Narrative:    EXAMINATION:  XR CHEST AP PORTABLE    CLINICAL HISTORY:  Encounter for adjustment and management of vascular access device    TECHNIQUE:  Single frontal view of the chest was performed.    COMPARISON:  08/13/2022 taken at 17:57.    FINDINGS:  There is a left internal jugular central venous catheter terminating high SVC.    The lungs are well expanded.  There is mild subsegmental atelectasis in the left lung base.  The lungs are otherwise  clear.  The pleural spaces are clear.    The cardiac silhouette is enlarged, unchanged.  There are atherosclerotic calcifications of the aortic arch.    Visualized osseous structures are intact.  There are median sternotomy wires.                               CT Head Without Contrast (In process)                X-Ray Chest AP Portable (In process)                  Medications   lactated ringers bolus 1,000 mL (1,000 mLs Intravenous New Bag 8/13/22 2059)   sodium chloride 0.9% flush 3 mL (has no administration in time range)   melatonin tablet 9 mg (has no administration in time range)   ondansetron injection 8 mg (has no administration in time range)   polyethylene glycol packet 17 g (has no administration in time range)   acetaminophen tablet 650 mg (has no administration in time range)   simethicone chewable tablet 80 mg (has no administration in time range)   aluminum-magnesium hydroxide-simethicone 200-200-20 mg/5 mL suspension 30 mL (has no administration in time range)   acetaminophen tablet 650 mg (has no administration in time range)   naloxone 0.4 mg/mL injection 0.02 mg (has no administration in time range)   potassium bicarbonate disintegrating tablet 50 mEq (has no administration in time range)   potassium bicarbonate disintegrating tablet 35 mEq (has no administration in time range)   potassium bicarbonate disintegrating tablet 60 mEq (has no administration in time range)   magnesium oxide tablet 800 mg (has no administration in time range)   magnesium oxide tablet 800 mg (has no administration in time range)   potassium, sodium phosphates 280-160-250 mg packet 2 packet (has no administration in time range)   potassium, sodium phosphates 280-160-250 mg packet 2 packet (has no administration in time range)   potassium, sodium phosphates 280-160-250 mg packet 2 packet (has no administration in time range)   glucose chewable tablet 16 g (has no administration in time range)   glucose chewable tablet 24 g  (has no administration in time range)   dextrose 50% injection 12.5 g (has no administration in time range)   dextrose 50% injection 25 g (has no administration in time range)   glucagon (human recombinant) injection 1 mg (has no administration in time range)   heparin (porcine) injection 5,000 Units (has no administration in time range)   0.9%  NaCl infusion (has no administration in time range)   0.9%  NaCl infusion ( Intravenous Stopped 8/13/22 1815)   piperacillin-tazobactam 2.25 g in dextrose 5 % 50 mL IVPB (ready to mix system) (2.25 g Intravenous New Bag 8/13/22 2107)     Medical Decision Making:   History:   Old Medical Records: I decided to obtain old medical records.  Independently Interpreted Test(s):   I have ordered and independently interpreted X-rays - see prior notes.  I have ordered and independently interpreted EKG Reading(s) - see prior notes  Clinical Tests:   Lab Tests: Ordered and Reviewed  Radiological Study: Ordered and Reviewed  Medical Tests: Ordered and Reviewed  ED Management:  56-year-old female presents with altered mental status.  CT reveals slight worsening of her hydrocephalus.  I discussed the case with Dr. Sheppard who feels that the patient can be admitted this facility but does not feel that immediate intervention is necessary.  She developed transient hypotension with associated pyuria with sepsis likely.  After hydration she has marked improvement in mentation.  She will be admitted for IV fluids, antibiotics.  Central line is placed initially with the intention of starting vasoactive medication.  Should she developed hypotension she will be started on Levophed.       APC / Resident Notes:   I, Dr. Max Curtis III, personally performed the services described in this documentation. All medical record entries made by the scribe were at my direction and in my presence.  I have reviewed the chart and agree that the record reflects my personal performance and is accurate and complete      Scribe Attestation:   Scribe #1: I performed the above scribed service and the documentation accurately describes the services I performed. I attest to the accuracy of the note.                 Clinical Impression:   Final diagnoses:  [R41.0] Confusion  [R41.0] Confusion  [F05] Confusion after a seizure  [Z45.2] Encounter for central line placement  [A41.9, R65.20] Severe sepsis          ED Disposition Condition    Admit               Max Curtis III, MD  08/13/22 6111

## 2022-08-13 NOTE — ED NOTES
Pt awake, alert and oriented x 2 at this time, talking with spouse at bedside. Pt denies complaint or pain at this time.

## 2022-08-14 LAB
ALBUMIN SERPL BCP-MCNC: 2 G/DL (ref 3.5–5.2)
ALP SERPL-CCNC: 110 U/L (ref 55–135)
ALT SERPL W/O P-5'-P-CCNC: 8 U/L (ref 10–44)
ANION GAP SERPL CALC-SCNC: 12 MMOL/L (ref 8–16)
AST SERPL-CCNC: 11 U/L (ref 10–40)
BASOPHILS # BLD AUTO: 0.04 K/UL (ref 0–0.2)
BASOPHILS NFR BLD: 0.4 % (ref 0–1.9)
BILIRUB SERPL-MCNC: 0.2 MG/DL (ref 0.1–1)
BUN SERPL-MCNC: 41 MG/DL (ref 6–20)
CALCIUM SERPL-MCNC: 7.9 MG/DL (ref 8.7–10.5)
CHLORIDE SERPL-SCNC: 106 MMOL/L (ref 95–110)
CO2 SERPL-SCNC: 14 MMOL/L (ref 23–29)
CREAT SERPL-MCNC: 3.1 MG/DL (ref 0.5–1.4)
DIFFERENTIAL METHOD: ABNORMAL
EOSINOPHIL # BLD AUTO: 0.2 K/UL (ref 0–0.5)
EOSINOPHIL NFR BLD: 1.6 % (ref 0–8)
ERYTHROCYTE [DISTWIDTH] IN BLOOD BY AUTOMATED COUNT: 14 % (ref 11.5–14.5)
EST. GFR  (NO RACE VARIABLE): 17 ML/MIN/1.73 M^2
FOLATE SERPL-MCNC: 5.7 NG/ML (ref 4–24)
GLUCOSE SERPL-MCNC: 253 MG/DL (ref 70–110)
GLUCOSE SERPL-MCNC: 363 MG/DL (ref 70–110)
HCT VFR BLD AUTO: 31.7 % (ref 37–48.5)
HGB BLD-MCNC: 10.3 G/DL (ref 12–16)
IMM GRANULOCYTES # BLD AUTO: 0.06 K/UL (ref 0–0.04)
IMM GRANULOCYTES NFR BLD AUTO: 0.5 % (ref 0–0.5)
LYMPHOCYTES # BLD AUTO: 1.3 K/UL (ref 1–4.8)
LYMPHOCYTES NFR BLD: 11.4 % (ref 18–48)
MAGNESIUM SERPL-MCNC: 1.7 MG/DL (ref 1.6–2.6)
MCH RBC QN AUTO: 29.6 PG (ref 27–31)
MCHC RBC AUTO-ENTMCNC: 32.5 G/DL (ref 32–36)
MCV RBC AUTO: 91 FL (ref 82–98)
MONOCYTES # BLD AUTO: 0.8 K/UL (ref 0.3–1)
MONOCYTES NFR BLD: 7.2 % (ref 4–15)
NEUTROPHILS # BLD AUTO: 8.8 K/UL (ref 1.8–7.7)
NEUTROPHILS NFR BLD: 78.9 % (ref 38–73)
NRBC BLD-RTO: 0 /100 WBC
OSMOLALITY SERPL: 298 MOSM/KG (ref 275–295)
PHOSPHATE SERPL-MCNC: 4 MG/DL (ref 2.7–4.5)
PLATELET # BLD AUTO: 286 K/UL (ref 150–450)
PMV BLD AUTO: 10.9 FL (ref 9.2–12.9)
POCT GLUCOSE: 322 MG/DL (ref 70–110)
POCT GLUCOSE: 374 MG/DL (ref 70–110)
POTASSIUM SERPL-SCNC: 4.3 MMOL/L (ref 3.5–5.1)
PROT SERPL-MCNC: 6.1 G/DL (ref 6–8.4)
RBC # BLD AUTO: 3.48 M/UL (ref 4–5.4)
SODIUM SERPL-SCNC: 132 MMOL/L (ref 136–145)
VIT B12 SERPL-MCNC: 413 PG/ML (ref 210–950)
WBC # BLD AUTO: 11.18 K/UL (ref 3.9–12.7)

## 2022-08-14 PROCEDURE — 36415 COLL VENOUS BLD VENIPUNCTURE: CPT | Performed by: INTERNAL MEDICINE

## 2022-08-14 PROCEDURE — 25000003 PHARM REV CODE 250: Performed by: NURSE PRACTITIONER

## 2022-08-14 PROCEDURE — 63600175 PHARM REV CODE 636 W HCPCS: Performed by: NURSE PRACTITIONER

## 2022-08-14 PROCEDURE — 82607 VITAMIN B-12: CPT | Performed by: INTERNAL MEDICINE

## 2022-08-14 PROCEDURE — 82542 COL CHROMOTOGRAPHY QUAL/QUAN: CPT | Performed by: INTERNAL MEDICINE

## 2022-08-14 PROCEDURE — 84425 ASSAY OF VITAMIN B-1: CPT | Performed by: INTERNAL MEDICINE

## 2022-08-14 PROCEDURE — 85025 COMPLETE CBC W/AUTO DIFF WBC: CPT | Performed by: NURSE PRACTITIONER

## 2022-08-14 PROCEDURE — 84207 ASSAY OF VITAMIN B-6: CPT | Performed by: INTERNAL MEDICINE

## 2022-08-14 PROCEDURE — 82746 ASSAY OF FOLIC ACID SERUM: CPT | Performed by: INTERNAL MEDICINE

## 2022-08-14 PROCEDURE — 36415 COLL VENOUS BLD VENIPUNCTURE: CPT | Performed by: NURSE PRACTITIONER

## 2022-08-14 PROCEDURE — C9399 UNCLASSIFIED DRUGS OR BIOLOG: HCPCS | Performed by: NURSE PRACTITIONER

## 2022-08-14 PROCEDURE — 12000002 HC ACUTE/MED SURGE SEMI-PRIVATE ROOM

## 2022-08-14 PROCEDURE — 83735 ASSAY OF MAGNESIUM: CPT | Performed by: NURSE PRACTITIONER

## 2022-08-14 PROCEDURE — 80053 COMPREHEN METABOLIC PANEL: CPT | Performed by: NURSE PRACTITIONER

## 2022-08-14 PROCEDURE — 84100 ASSAY OF PHOSPHORUS: CPT | Performed by: NURSE PRACTITIONER

## 2022-08-14 RX ORDER — PANTOPRAZOLE SODIUM 40 MG/1
40 TABLET, DELAYED RELEASE ORAL DAILY
Status: DISCONTINUED | OUTPATIENT
Start: 2022-08-14 | End: 2022-08-18 | Stop reason: HOSPADM

## 2022-08-14 RX ORDER — CARVEDILOL 3.12 MG/1
3.12 TABLET ORAL 2 TIMES DAILY
Status: DISCONTINUED | OUTPATIENT
Start: 2022-08-14 | End: 2022-08-18 | Stop reason: HOSPADM

## 2022-08-14 RX ORDER — FUROSEMIDE 40 MG/1
40 TABLET ORAL DAILY
Status: DISCONTINUED | OUTPATIENT
Start: 2022-08-14 | End: 2022-08-14

## 2022-08-14 RX ORDER — CLOPIDOGREL BISULFATE 75 MG/1
75 TABLET ORAL DAILY
Status: DISCONTINUED | OUTPATIENT
Start: 2022-08-14 | End: 2022-08-18 | Stop reason: HOSPADM

## 2022-08-14 RX ORDER — MEROPENEM AND SODIUM CHLORIDE 500 MG/50ML
500 INJECTION, SOLUTION INTRAVENOUS
Status: DISCONTINUED | OUTPATIENT
Start: 2022-08-14 | End: 2022-08-18 | Stop reason: HOSPADM

## 2022-08-14 RX ORDER — ASPIRIN 81 MG/1
81 TABLET ORAL DAILY
Status: DISCONTINUED | OUTPATIENT
Start: 2022-08-14 | End: 2022-08-18 | Stop reason: HOSPADM

## 2022-08-14 RX ORDER — ATORVASTATIN CALCIUM 40 MG/1
80 TABLET, FILM COATED ORAL NIGHTLY
Status: DISCONTINUED | OUTPATIENT
Start: 2022-08-14 | End: 2022-08-18 | Stop reason: HOSPADM

## 2022-08-14 RX ORDER — AMITRIPTYLINE HYDROCHLORIDE 10 MG/1
10 TABLET, FILM COATED ORAL NIGHTLY PRN
Status: DISCONTINUED | OUTPATIENT
Start: 2022-08-14 | End: 2022-08-18 | Stop reason: HOSPADM

## 2022-08-14 RX ORDER — OXYCODONE HYDROCHLORIDE 5 MG/1
5 TABLET ORAL EVERY 6 HOURS PRN
Status: DISCONTINUED | OUTPATIENT
Start: 2022-08-14 | End: 2022-08-18 | Stop reason: HOSPADM

## 2022-08-14 RX ORDER — SERTRALINE HYDROCHLORIDE 50 MG/1
50 TABLET, FILM COATED ORAL DAILY
Status: DISCONTINUED | OUTPATIENT
Start: 2022-08-14 | End: 2022-08-18 | Stop reason: HOSPADM

## 2022-08-14 RX ORDER — NIFEDIPINE 30 MG/1
30 TABLET, EXTENDED RELEASE ORAL DAILY
Status: DISCONTINUED | OUTPATIENT
Start: 2022-08-14 | End: 2022-08-18 | Stop reason: HOSPADM

## 2022-08-14 RX ORDER — INSULIN ASPART 100 [IU]/ML
1-10 INJECTION, SOLUTION INTRAVENOUS; SUBCUTANEOUS
Status: DISCONTINUED | OUTPATIENT
Start: 2022-08-14 | End: 2022-08-18 | Stop reason: HOSPADM

## 2022-08-14 RX ORDER — PREGABALIN 75 MG/1
75 CAPSULE ORAL 3 TIMES DAILY
Status: DISCONTINUED | OUTPATIENT
Start: 2022-08-14 | End: 2022-08-18 | Stop reason: HOSPADM

## 2022-08-14 RX ORDER — ALLOPURINOL 100 MG/1
100 TABLET ORAL DAILY
Status: DISCONTINUED | OUTPATIENT
Start: 2022-08-14 | End: 2022-08-18 | Stop reason: HOSPADM

## 2022-08-14 RX ORDER — LANOLIN ALCOHOL/MO/W.PET/CERES
400 CREAM (GRAM) TOPICAL 2 TIMES DAILY
Status: DISCONTINUED | OUTPATIENT
Start: 2022-08-14 | End: 2022-08-18 | Stop reason: HOSPADM

## 2022-08-14 RX ADMIN — CLOPIDOGREL BISULFATE 75 MG: 75 TABLET, FILM COATED ORAL at 10:08

## 2022-08-14 RX ADMIN — INSULIN ASPART 8 UNITS: 100 INJECTION, SOLUTION INTRAVENOUS; SUBCUTANEOUS at 04:08

## 2022-08-14 RX ADMIN — MEROPENEM AND SODIUM CHLORIDE 500 MG: 500 INJECTION, SOLUTION INTRAVENOUS at 04:08

## 2022-08-14 RX ADMIN — INSULIN ASPART 3 UNITS: 100 INJECTION, SOLUTION INTRAVENOUS; SUBCUTANEOUS at 08:08

## 2022-08-14 RX ADMIN — PREGABALIN 75 MG: 75 CAPSULE ORAL at 10:08

## 2022-08-14 RX ADMIN — PANTOPRAZOLE SODIUM 40 MG: 40 TABLET, DELAYED RELEASE ORAL at 10:08

## 2022-08-14 RX ADMIN — INSULIN ASPART 3 UNITS: 100 INJECTION, SOLUTION INTRAVENOUS; SUBCUTANEOUS at 12:08

## 2022-08-14 RX ADMIN — SERTRALINE HYDROCHLORIDE 50 MG: 50 TABLET ORAL at 10:08

## 2022-08-14 RX ADMIN — ATORVASTATIN CALCIUM 80 MG: 40 TABLET, FILM COATED ORAL at 12:08

## 2022-08-14 RX ADMIN — RIVAROXABAN 2.5 MG: 2.5 TABLET, FILM COATED ORAL at 08:08

## 2022-08-14 RX ADMIN — PREGABALIN 75 MG: 75 CAPSULE ORAL at 02:08

## 2022-08-14 RX ADMIN — ATORVASTATIN CALCIUM 80 MG: 40 TABLET, FILM COATED ORAL at 08:08

## 2022-08-14 RX ADMIN — CARVEDILOL 3.12 MG: 3.12 TABLET, FILM COATED ORAL at 10:08

## 2022-08-14 RX ADMIN — INSULIN DETEMIR 12 UNITS: 100 INJECTION, SOLUTION SUBCUTANEOUS at 12:08

## 2022-08-14 RX ADMIN — Medication 400 MG: at 10:08

## 2022-08-14 RX ADMIN — Medication 400 MG: at 08:08

## 2022-08-14 RX ADMIN — INSULIN ASPART 6 UNITS: 100 INJECTION, SOLUTION INTRAVENOUS; SUBCUTANEOUS at 10:08

## 2022-08-14 RX ADMIN — ALLOPURINOL 100 MG: 100 TABLET ORAL at 10:08

## 2022-08-14 RX ADMIN — INSULIN ASPART 10 UNITS: 100 INJECTION, SOLUTION INTRAVENOUS; SUBCUTANEOUS at 07:08

## 2022-08-14 RX ADMIN — NIFEDIPINE 30 MG: 30 TABLET, FILM COATED, EXTENDED RELEASE ORAL at 10:08

## 2022-08-14 RX ADMIN — RIVAROXABAN 2.5 MG: 2.5 TABLET, FILM COATED ORAL at 12:08

## 2022-08-14 RX ADMIN — PREGABALIN 75 MG: 75 CAPSULE ORAL at 08:08

## 2022-08-14 RX ADMIN — ASPIRIN 81 MG: 81 TABLET, COATED ORAL at 04:08

## 2022-08-14 RX ADMIN — SODIUM CHLORIDE: 0.9 INJECTION, SOLUTION INTRAVENOUS at 08:08

## 2022-08-14 RX ADMIN — Medication 400 MG: at 12:08

## 2022-08-14 RX ADMIN — POLYETHYLENE GLYCOL 3350 17 G: 17 POWDER, FOR SOLUTION ORAL at 10:08

## 2022-08-14 NOTE — ASSESSMENT & PLAN NOTE
This patient does have evidence of infective focus  My overall impression is sepsis. Vital signs were reviewed and noted in progress note.  Antibiotics given-   Antibiotics (From admission, onward)            Start     Stop Route Frequency Ordered    08/14/22 0115  meropenem-0.9% sodium chloride 1 g/50 mL IVPB         -- IV Every 8 hours (non-standard times) 08/14/22 0002        Cultures were taken-   Microbiology Results (last 7 days)     Procedure Component Value Units Date/Time    Urine culture [[id number]] Collected: 08/13/22 1855    Order Status: No result Specimen: Urine Updated: 08/13/22 1929        Latest lactate reviewed, they are-  Recent Labs   Lab 08/13/22  1747 08/13/22  2204   LACTATE 1.2 1.4       Organ dysfunction indicated by Acute kidney injury and Encephalopathy   Source- urine    Source control Achieved by- antibiotics

## 2022-08-14 NOTE — ASSESSMENT & PLAN NOTE
Patient with known CAD s/p CABG, which is controlled Will continue ASA, Plavix and Statin and monitor for S/Sx of angina/ACS. Continue to monitor on telemetry.

## 2022-08-14 NOTE — ASSESSMENT & PLAN NOTE
Body mass index is 25.79 kg/m². Morbid obesity complicates all aspects of disease management from diagnostic modalities to treatment. Weight loss encouraged and health benefits explained to patient.

## 2022-08-14 NOTE — ASSESSMENT & PLAN NOTE
Patient's FSGs are controlled on current medication regimen.  Last A1c reviewed-   Lab Results   Component Value Date    HGBA1C 6.9 (H) 03/26/2022     Most recent fingerstick glucose reviewed-   Recent Labs   Lab 08/13/22  1725 08/13/22  2347   POCTGLUCOSE 135* 253*     Current correctional scale  Medium  Maintain anti-hyperglycemic dose as follows-   Antihyperglycemics (From admission, onward)            Start     Stop Route Frequency Ordered    08/14/22 0100  insulin detemir U-100 pen 12 Units         -- SubQ Nightly 08/14/22 0001        Hold Oral hypoglycemics while patient is in the hospital.

## 2022-08-14 NOTE — SUBJECTIVE & OBJECTIVE
Past Medical History:   Diagnosis Date    Anxiety     Chronic pain syndrome     CKD (chronic kidney disease), stage III     Depression     Diabetes mellitus     type 2    Diabetes mellitus, type 2     GERD (gastroesophageal reflux disease)     Hyperemesis 3/23/2021    Hyperlipemia     Hypertension     Hypokalemia 3/23/2021    Myocardial infarction 2010    minor-caused by stress per pt.    Osteomyelitis     Osteomyelitis of left foot 4/30/2021    PVD (peripheral vascular disease)     Vaginal delivery     x1       Past Surgical History:   Procedure Laterality Date    ABOVE-KNEE AMPUTATION Left 5/18/2021    Procedure: AMPUTATION, ABOVE KNEE;  Surgeon: Teddy Huber MD;  Location: [facility] OR [address] [place] [address];  Service: Vascular;  Laterality: Left;    ABOVE-KNEE AMPUTATION Right 3/18/2022    Procedure: AMPUTATION, ABOVE KNEE;  Surgeon: DAYNE Florez II, MD;  Location: [facility] OR [address] [place] [address];  Service: Vascular;  Laterality: Right;    Angiogram - Right Extremity Right 7/9/15    angiogram-left leg  10/6/15    ANGIOGRAPHY OF LOWER EXTREMITY Left 4/29/2021    Procedure: ANGIOGRAM, LOWER EXTREMITY;  Surgeon: Teddy Huber MD;  Location: [address];  Service: Vascular;  Laterality: Left;    BELOW KNEE AMPUTATION OF LOWER EXTREMITY Right 12/28/2021    Procedure: AMPUTATION, BELOW KNEE;  Surgeon: Kaitlyn Rojas MD;  Location: [facility];  Service: General;  Laterality: Right;    CATHETERIZATION OF BOTH LEFT AND RIGHT HEART N/A 12/18/2019    Procedure: CATHETERIZATION, HEART, BOTH LEFT AND RIGHT;  Surgeon: Que Fernando III, MD;  Location: [facility] CATH LAB;  Service: Cardiology;  Laterality: N/A;    CORONARY ANGIOGRAPHY N/A 12/18/2019    Procedure: ANGIOGRAM, CORONARY ARTERY;  Surgeon: Que Fernando III, MD;  Location: [facility] CATH LAB;  Service: Cardiology;  Laterality: N/A;    CORONARY ANGIOGRAPHY INCLUDING BYPASS GRAFTS WITH CATHETERIZATION OF LEFT HEART N/A 7/28/2020    Procedure: ANGIOGRAM, CORONARY, INCLUDING  BYPASS GRAFT, WITH LEFT HEART CATHETERIZATION, 9 am;  Surgeon: Rachel Easley MD;  Location: [facility] CATH LAB;  Service: Cardiology;  Laterality: N/A;    CORONARY ARTERY BYPASS GRAFT (CABG) N/A 1/14/2020    Procedure: CORONARY ARTERY BYPASS GRAFT (CABG) x 1     Off Pump;  Surgeon: Huang Altamirano MD;  Location: [facility] OR [address] [place] [address];  Service: Cardiovascular;  Laterality: N/A;    CREATION OF FEMORAL-TIBIAL ARTERY BYPASS Left 4/29/2021    Procedure: CREATION, BYPASS, ARTERIAL, FEMORAL TO ANTERIOR TIBIAL;  Surgeon: Teddy Huber MD;  [facility];  Service: Vascular;  Laterality: Left;    CREATION OF FEMOROPOPLITEAL ARTERIAL BYPASS USING GRAFT Left 8/18/2020    Procedure: CREATION, BYPASS, ARTERIAL, FEMORAL TO POPLITEAL, USING GRAFT, LEFT LOWER EXTREMITY;  Surgeon: Teddy Huber MD;  Location: [facility];  Service: Vascular;  Laterality: Left;  REQUEST 7:15 A.M. START----COVID NEGATIVE ON 8/17  1ST CASE STARTE PER LEANA ON 8/7/2020 @ 942AM-  RN PREOP 8/12/2020   T/S-----CLEARED BY CARDS-------PENDING INSURANCE    DEBRIDEMENT OF FOOT Left 9/8/2020    Procedure: DEBRIDEMENT, FOOT;  Surgeon: Rosio Mayes DPM;  Location: [facility];  Service: Podiatry;  Laterality: Left;  request neoxx .   RN Pre Op 9-4-2020, Covid negative on 9/5/20. C A    DEBRIDEMENT OF FOOT  3/4/2021    Procedure: DEBRIDEMENT, FOOT;  Surgeon: Teddy Huber MD;  Location: [facility] OR;  Service: Vascular;;    DEBRIDEMENT OF FOOT Left 3/9/2021    Procedure: DEBRIDEMENT, FOOT, bone biopsy;  Surgeon: Rosio Mayes DPM;  Location: [facility] OR;  Service: Podiatry;  Laterality: Left;  Request neoxx---COVID IN AM  REQUESTING NOON START  RN Phone Pre op.On Blood thinners Plavix and Eliquis.  Covid am of surgery. C A    DEBRIDEMENT OF FOOT Left 5/4/2021    Procedure: DEBRIDEMENT, FOOT;  Surgeon: Farooq Morley DPM;  [address];  Service: Podiatry;  Laterality: Left;    INSERTION OF TUNNELED CENTRAL VENOUS HEMODIALYSIS  CATHETER N/A 1/27/2020    Procedure: Insertion, Catheter, Central Venous, Hemodialysis;  Surgeon: ESTEBAN Gomez III, MD;  Location: [facility] CATH LAB;  Service: Peripheral Vascular;  Laterality: N/A;    PERCUTANEOUS TRANSLUMINAL ANGIOPLASTY N/A 3/4/2021    Procedure: PTA (ANGIOPLASTY, PERCUTANEOUS, TRANSLUMINAL);  Surgeon: Teddy Huber MD;  Location: [facility] OR;  Service: Vascular;  Laterality: N/A;    REMOVAL OF ARTERIOVENOUS GRAFT Left 5/27/2021    Procedure: REMOVAL, GRAFT, LEFT LOWER EXTREMITY, WOUND EXPLORATION;  Surgeon: Teddy Huber MD;  Location: [facility] OR 2ND FLR;  Service: Vascular;  Laterality: Left;    REMOVAL OF NAIL OF DIGIT Left 3/9/2021    Procedure: REMOVAL, NAIL, DIGIT;  Surgeon: Rosio Mayes DPM;  Location: [facility] OR;  Service: Podiatry;  Laterality: Left;    THROMBECTOMY Left 3/4/2021    Procedure: THROMBECTOMY, LEFT LOWER EXTREMITY BYPASS GRAFT, ANGIOGRAM, POSSIBLE INTERVENTION, POSSIBLE LEFT LOWER EXTREMITY BYPASS;  Surgeon: Teddy Huber MD;  Location: [facility] OR;  Service: Vascular;  Laterality: Left;    THROMBECTOMY Left 4/29/2021    Procedure: GRAFT THROMBECTOMY, LEFT LOWER EXTREMITY;  Surgeon: Teddy Huber MD;  Location: [facility] OR 2ND FLR;  Service: Vascular;  Laterality: Left;  14.5 min  1179.85 mGy  341.01 Gycm2  240 ml dye    THROMBECTOMY  10/22/2021    Procedure: THROMBECTOMY;  Surgeon: Saad Arenas MD;  Location: [facility] CATH LAB/EP;  Service: Cardiology;;       Review of patient's allergies indicates:   Allergen Reactions    Ciprofloxacin Itching    Contrast media      Kidney injury    Iodine      Kidney injury       No current facility-administered medications on file prior to encounter.     Current Outpatient Medications on File Prior to Encounter   Medication Sig    acetaminophen (TYLENOL) 500 MG tablet Take 2 tablets (1,000 mg total) by mouth every 8 (eight) hours as needed for Pain.    albuterol (PROVENTIL) 2.5 mg /3 mL (0.083 %) nebulizer solution INHALE  3MLS EVERY 4 HOURS AS NEEDED FOR SHORTNESS OF BREATH FOR UP TO 30 DAYS    allopurinoL (ZYLOPRIM) 100 MG tablet Take 1 tablet (100 mg total) by mouth once daily.    amitriptyline (ELAVIL) 10 MG tablet Take 1 tablet (10 mg total) by mouth nightly as needed for Pain.    atorvastatin (LIPITOR) 80 MG tablet TAKE 1 TABLET(80 MG) BY MOUTH EVERY NIGHT    blood-glucose meter,continuous (DEXCOM G6 ) Misc Use to check blood sugars 4 times/day    blood-glucose sensor (DEXCOM G6 SENSOR) Radha Apply one sensor to skin every 10 days    blood-glucose transmitter (DEXCOM G6 TRANSMITTER) Radha Replace transmitter every 3 months to use with dexcom sensor    carvediloL (COREG) 3.125 MG tablet TAKE 1 TABLET(3.125 MG) BY MOUTH TWICE DAILY WITH BREAKFAST AND DINNER    clopidogreL (PLAVIX) 75 mg tablet TAKE 1 TABLET(75 MG) BY MOUTH DAILY    furosemide (LASIX) 40 MG tablet Take 1 tablet (40 mg total) by mouth once daily.    insulin detemir U-100 (LEVEMIR FLEXTOUCH) 100 unit/mL (3 mL) SubQ InPn pen Inject 12 Units into the skin every evening.    magnesium oxide (MAG-OX) 400 mg (241.3 mg magnesium) tablet Take 1 tablet (400 mg total) by mouth 2 (two) times daily.    melatonin (MELATIN) 3 mg tablet Take 2 tablets (6 mg total) by mouth nightly as needed for Insomnia.    multivit/folic acid/vit K1 (ONE-A-DAY WOMEN'S 50 PLUS ORAL) Take 1 tablet by mouth Daily.    NIFEdipine (PROCARDIA-XL) 30 MG (OSM) 24 hr tablet Take 1 tablet (30 mg total) by mouth once daily.    NOVOLOG FLEXPEN U-100 INSULIN 100 unit/mL (3 mL) InPn pen INJECT 5 UNITS INTO SKIN THREE TIMES DAILY WITH MEALS PLUS SLIDING SCALE FOR MAX DAILY DOSE OF 25 UNITS, HOLD IF NOT EATING (Patient taking differently: 8 Units. INJECT 8 UNITS INTO SKIN THREE TIMES DAILY WITH MEALS PLUS SLIDING SCALE FOR MAX DAILY DOSE OF 25 UNITS, HOLD IF NOT EATING)    ondansetron (ZOFRAN-ODT) 8 MG TbDL Dissolve 1 tablet (8 mg total) by mouth every 8 (eight) hours as needed.    oxyCODONE (ROXICODONE) 5  "MG immediate release tablet Take 1 tablet (5 mg total) by mouth every 6 (six) hours as needed for Pain.    pantoprazole (PROTONIX) 40 MG tablet TAKE 1 TABLET(40 MG) BY MOUTH DAILY    pregabalin (LYRICA) 75 MG capsule Take 1 capsule (75 mg total) by mouth 3 (three) times daily.    sertraline (ZOLOFT) 50 MG tablet TAKE 1 TABLET(50 MG) BY MOUTH DAILY    sodium bicarbonate 650 MG tablet Take 1 tablet (650 mg total) by mouth 2 (two) times daily. Please hold until follow up with Primary Care Provider    [DISCONTINUED] lancing device Misc 1 Device by Misc.(Non-Drug; Combo Route) route 2 (two) times daily with meals.     Family History       Problem Relation (Age of Onset)    Diabetes Mother, Father, Paternal Grandmother    Heart disease Maternal Grandmother    No Known Problems Maternal Grandfather, Paternal Grandfather          Tobacco Use    Smoking status: Former Smoker    Smokeless tobacco: Never Used   Substance and Sexual Activity    Alcohol use: No    Drug use: Yes     Types: Marijuana     Comment: occassional    Sexual activity: Yes     Partners: Male     Review of Systems   Constitutional:  Positive for fatigue. Negative for chills and fever.   HENT:  Negative for congestion and sore throat.    Eyes:  Negative for visual disturbance.   Respiratory:  Negative for cough and shortness of breath.    Cardiovascular:  Negative for chest pain and palpitations.   Gastrointestinal:  Negative for abdominal pain, nausea and vomiting.   Endocrine: Negative for cold intolerance and heat intolerance.   Genitourinary:  Negative for dysuria and hematuria.   Musculoskeletal:  Negative for arthralgias and myalgias.   Skin:  Negative for pallor and rash.   Neurological:  Negative for tremors and seizures.   Hematological:  Negative for adenopathy. Does not bruise/bleed easily.   Psychiatric/Behavioral:  Positive for confusion ("i felt like my brain was messed up, like i was there but i wasnt there") and decreased concentration. " Negative for hallucinations.    All other systems reviewed and are negative.  Objective:     Vital Signs (Most Recent):  Temp: 98.1 °F (36.7 °C) (08/13/22 1736)  Pulse: 89 (08/13/22 2253)  Resp: 16 (08/13/22 1736)  BP: (!) 109/54 (08/13/22 2253)  SpO2: 100 % (08/13/22 2253)   Vital Signs (24h Range):  Temp:  [98.1 °F (36.7 °C)] 98.1 °F (36.7 °C)  Pulse:  [80-92] 89  Resp:  [16] 16  SpO2:  [98 %-100 %] 100 %  BP: ()/(35-63) 109/54     Weight: 70.3 kg (155 lb)  Body mass index is 25.79 kg/m².    Physical Exam  Vitals and nursing note reviewed.   Constitutional:       General: She is awake. She is not in acute distress.     Appearance: She is well-developed. She is obese. She is ill-appearing (chronically ill).   HENT:      Head: Normocephalic and atraumatic.      Mouth/Throat:      Lips: Pink.      Mouth: Mucous membranes are moist.   Eyes:      Conjunctiva/sclera: Conjunctivae normal.      Pupils: Pupils are equal, round, and reactive to light.   Neck:      Thyroid: No thyromegaly.      Vascular: No JVD.   Cardiovascular:      Rate and Rhythm: Normal rate and regular rhythm.      Heart sounds: Normal heart sounds, S1 normal and S2 normal. No murmur heard.    No friction rub. No gallop.   Pulmonary:      Effort: Pulmonary effort is normal.      Breath sounds: Normal breath sounds.   Abdominal:      General: Abdomen is protuberant. Bowel sounds are normal. There is no distension.      Palpations: Abdomen is soft. There is no mass.      Tenderness: There is no abdominal tenderness.   Musculoskeletal:         General: Normal range of motion.      Cervical back: Full passive range of motion without pain, normal range of motion and neck supple.      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      Left Lower Extremity: Left leg is amputated above knee.   Skin:     General: Skin is warm and dry.      Capillary Refill: Capillary refill takes 2 to 3 seconds.    Neurological:      General: No focal deficit present.      Mental Status: She is alert and oriented to person, place, and time.      Cranial Nerves: No cranial nerve deficit.      Sensory: Sensation is intact.      Motor: Motor function is intact.      Comments: Oriented to person, place and situation, unable to tell birthdate  Converses well, no dysarthria and no aphasia  Moves all extremities well with equal strength   Psychiatric:         Behavior: Behavior normal. Behavior is cooperative.         CRANIAL NERVES     CN III, IV, VI   Pupils are equal, round, and reactive to light.     Significant Labs: All pertinent labs within the past 24 hours have been reviewed.  CBC:   Recent Labs   Lab 08/13/22  1747   WBC 10.64   HGB 10.5*   HCT 30.5*        CMP:   Recent Labs   Lab 08/13/22  1747   *   K 4.0      CO2 17*   *   BUN 42*   CREATININE 3.3*   CALCIUM 8.3*   PROT 5.9*   ALBUMIN 2.1*   BILITOT 0.3   ALKPHOS 91   AST 10   ALT 7*   ANIONGAP 11       Lactic Acid:   Recent Labs   Lab 08/13/22  1747 08/13/22  2204   LACTATE 1.2 1.4     POCT Glucose:   Recent Labs   Lab 08/13/22  1725   POCTGLUCOSE 135*       TSH:   Recent Labs   Lab 08/13/22  2204   TSH 0.885     Urine Studies:   Recent Labs   Lab 08/13/22  1855   COLORU Yellow   APPEARANCEUA Cloudy*   PHUR 6.0   SPECGRAV 1.025   PROTEINUA 2+*   GLUCUA Negative   KETONESU Negative   BILIRUBINUA 1+*   OCCULTUA 3+*   NITRITE Negative   UROBILINOGEN Negative   LEUKOCYTESUR 3+*   RBCUA 12*   WBCUA >100*   BACTERIA Many*   SQUAMEPITHEL 1   HYALINECASTS 0       Significant Imaging: I have reviewed all pertinent imaging results/findings within the past 24 hours.    CXR: Left IJ TLC placement, no acute cardiopulmonary abnormality    CT head: IMPRESSION:  1. Moderate ventriculomegaly involving the lateral ventricles and 3rd ventricle demonstrating mild  progression since the oldest comparison 05/25/2021, disproportionate to the degree of  peripheral  sulcal prominence and demonstrating increased apical sulcal effacement bilaterally. 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 Consider further assessment with LP and opening pressure, and neurologic or  neurosurgical consult. Nuclear medicine cisternography may be helpful in further assessment.  2. Gradually increasing periventricular white matter hypodensities are nonspecific and might  represent progression of microvascular ischemia in this age group, however in the setting of  hydronephrosis this raises concern for an element of transependymal fluid migration.  3. There are small chronic subependymal nodules in the anterior which are unchanged, favoring  benign lesions such as subependymal hamartomas. Correlate clinically for tuberous sclerosis.  4. Chronic densely calcified dural-based 14 mm lesion in the left middle cranial fossa is unchanged,  consistent with densely calcified meningioma or incidental dural ossification without significant mass  effect.  5. These findings initiated a results reporting process. An addendum will be issued at the time of  clinician notification.

## 2022-08-14 NOTE — ASSESSMENT & PLAN NOTE
Nutrition consulted. Most recent weight and BMI monitored-                         Measurements:  Wt Readings from Last 1 Encounters:   08/13/22 70.3 kg (155 lb)   Body mass index is 25.79 kg/m².    Recommendations:      Patient has been screened and assessed by RD. RD will follow patient.

## 2022-08-14 NOTE — PLAN OF CARE
Problem: Infection  Goal: Absence of Infection Signs and Symptoms  Outcome: Ongoing, Progressing  Patient continues abx treatment as ordered.     Problem: Adult Inpatient Plan of Care  Goal: Plan of Care Review  Outcome: Ongoing, Progressing  Plan of care reviewed with patient     Problem: Adult Inpatient Plan of Care  Goal: Patient-Specific Goal (Individualized)  Outcome: Ongoing, Progressing     Problem: Diabetes Comorbidity  Goal: Blood Glucose Level Within Targeted Range  Outcome: Ongoing, Progressing   Patient continues glucose monitoring as ordered.

## 2022-08-14 NOTE — PLAN OF CARE
Patient progressing towards discharge.    Patient had no acute events noted. IV fluids continued per MD order.  Bed alarm in place.  Fall precautions in place.  IV antibiotics per MD order.  Patient remains oriented with periods of confusion.  Discussed POC with patient and family with verbalization of understanding.    Will continue to monitor.

## 2022-08-14 NOTE — CONSULTS
[facility]  Neurology  Consult Note        PATIENT NAME: Suyapa Connelly  MRN: [id number]  CSN: [id number]      TODAY'S DATE: 08/14/2022  ADMIT DATE: 8/13/2022                            CONSULTING PROVIDER: Emanuel Arthur MD  CONSULT REQUESTED BY: Patricia Orozco MD      Reason for consult: Encephalopathy       History obtained from chart review and the patient.    HPI per EMR: Suyapa Connelly is a 56 y.o. female with a history of HTN, DM, CKD, CAD and past surgical history of bilateral AKA due to PAD and CABG, who presented to the ED with confusion and unresponsiveness. She was admitted three days ago for confusion with a CT notable for hydrocephalus, but her  signed her out AMA as he felt she had improved enough to take her home and no longer wanted her to be in the hospital, despite her persistent confusion. She was responsive and communicating more than she was on initial admit, so he said he felt he could care for her better at home. They went to [facility] today, where she started drooling and became confused and unresponsive. She denied any complaints prior. She denies complaint at this time.      Upon arrival to the ED, she was minimally responsive. Staff performed a sternal rub, which woke her up quickly. She was hypotensive and received IV boluses, which improved her blood pressure and mentation. She is currently awake, alert and oriented to self, place and situation.    Neurology consult:  Patient was seen and examined by me.  She is alert and oriented x3 except she does not know the month.  She is able to follow commands appropriately.  Patient's  is at bedside and states that she has had intermittent episodes of confusion and slow to respond in the last few days.    Patient was recently admitted to the hospital for encephalopathy on 08/10/2022 and Neurology was consulted at that time due to CT findings of bilateral parieto-occipital hypodensities.  MRI brain was done  which revealed parieto-occipital periventricular hyperintensities noted on FLAIR images which were chronic and no significant vasogenic edema was noted and imaging findings was not consistent with PRES.  Patient left AMA during that admission and now presents back with encephalopathy with decreased responsiveness.    Patient denies any headache, vision loss, speech deficits, unilateral weakness or sensory changes.    PREVIOUS MEDICAL HISTORY:  Past Medical History:   Diagnosis Date    Anxiety     Chronic pain syndrome     CKD (chronic kidney disease), stage III     Depression     Diabetes mellitus     type 2    Diabetes mellitus, type 2     GERD (gastroesophageal reflux disease)     Hyperemesis 3/23/2021    Hyperlipemia     Hypertension     Hypokalemia 3/23/2021    Myocardial infarction 2010    minor-caused by stress per pt.    Osteomyelitis     Osteomyelitis of left foot 4/30/2021    PVD (peripheral vascular disease)     Vaginal delivery     x1     PREVIOUS SURGICAL HISTORY:  Past Surgical History:   Procedure Laterality Date    ABOVE-KNEE AMPUTATION Left 5/18/2021    Procedure: AMPUTATION, ABOVE KNEE;  Surgeon: Teddy Huber MD;  Location: [facility] OR [address] [place] [address];  Service: Vascular;  Laterality: Left;    ABOVE-KNEE AMPUTATION Right 3/18/2022    Procedure: AMPUTATION, ABOVE KNEE;  Surgeon: DAYNE Florez II, MD;  Location: [facility] OR [address] [place] [address];  Service: Vascular;  Laterality: Right;    Angiogram - Right Extremity Right 7/9/15    angiogram-left leg  10/6/15    ANGIOGRAPHY OF LOWER EXTREMITY Left 4/29/2021    Procedure: ANGIOGRAM, LOWER EXTREMITY;  Surgeon: Teddy Huber MD;  Location: [address];  Service: Vascular;  Laterality: Left;    BELOW KNEE AMPUTATION OF LOWER EXTREMITY Right 12/28/2021    Procedure: AMPUTATION, BELOW KNEE;  Surgeon: Kaitlyn Rojas MD;  Location: [facility];  Service: General;  Laterality: Right;    CATHETERIZATION OF BOTH LEFT AND RIGHT HEART N/A  12/18/2019    Procedure: CATHETERIZATION, HEART, BOTH LEFT AND RIGHT;  Surgeon: Que Fernando III, MD;  Location: [facility] CATH LAB;  Service: Cardiology;  Laterality: N/A;    CORONARY ANGIOGRAPHY N/A 12/18/2019    Procedure: ANGIOGRAM, CORONARY ARTERY;  Surgeon: Que Fernando III, MD;  Location: [facility] CATH LAB;  Service: Cardiology;  Laterality: N/A;    CORONARY ANGIOGRAPHY INCLUDING BYPASS GRAFTS WITH CATHETERIZATION OF LEFT HEART N/A 7/28/2020    Procedure: ANGIOGRAM, CORONARY, INCLUDING BYPASS GRAFT, WITH LEFT HEART CATHETERIZATION, 9 am;  Surgeon: Rachel Easley MD;  Location: [facility] CATH LAB;  Service: Cardiology;  Laterality: N/A;    CORONARY ARTERY BYPASS GRAFT (CABG) N/A 1/14/2020    Procedure: CORONARY ARTERY BYPASS GRAFT (CABG) x 1     Off Pump;  Surgeon: Huang Altamirano MD;  Location: [facility] OR [address] [place] [address];  Service: Cardiovascular;  Laterality: N/A;    CREATION OF FEMORAL-TIBIAL ARTERY BYPASS Left 4/29/2021    Procedure: CREATION, BYPASS, ARTERIAL, FEMORAL TO ANTERIOR TIBIAL;  Surgeon: Teddy Huber MD;  Location: [facility] OR [address] [place] [address];  Service: Vascular;  Laterality: Left;    CREATION OF FEMOROPOPLITEAL ARTERIAL BYPASS USING GRAFT Left 8/18/2020    Procedure: CREATION, BYPASS, ARTERIAL, FEMORAL TO POPLITEAL, USING GRAFT, LEFT LOWER EXTREMITY;  Surgeon: Teddy Huber MD;  Location: [facility];  Service: Vascular;  Laterality: Left;  REQUEST 7:15 A.M. START----COVID NEGATIVE ON 8/17  1ST CASE STARTE PER LEANA ON 8/7/2020 @ 942AM-LO  RN PREOP 8/12/2020   T/S-----CLEARED BY CARDS-------PENDING INSURANCE    DEBRIDEMENT OF FOOT Left 9/8/2020    Procedure: DEBRIDEMENT, FOOT;  Surgeon: Rosio Mayes DPM;  Location: [facility] OR;  Service: Podiatry;  Laterality: Left;  request neoxx .   RN Pre Op 9-4-2020, Covid negative on 9/5/20. C A    DEBRIDEMENT OF FOOT  3/4/2021    Procedure: DEBRIDEMENT, FOOT;  Surgeon: Teddy Huber MD;  Location: [facility] OR;  Service: Vascular;;    DEBRIDEMENT OF  FOOT Left 3/9/2021    Procedure: DEBRIDEMENT, FOOT, bone biopsy;  Surgeon: Rosio Mayes DPM;  Location: [facility] OR;  Service: Podiatry;  Laterality: Left;  Request neoxx---COVID IN AM  REQUESTING NOON START  RN Phone Pre op.On Blood thinners Plavix and Eliquis.  Covid am of surgery. C A    DEBRIDEMENT OF FOOT Left 5/4/2021    Procedure: DEBRIDEMENT, FOOT;  Surgeon: Farooq Morley DPM;  Location: [facility] OR 2ND FLR;  Service: Podiatry;  Laterality: Left;    INSERTION OF TUNNELED CENTRAL VENOUS HEMODIALYSIS CATHETER N/A 1/27/2020    Procedure: Insertion, Catheter, Central Venous, Hemodialysis;  Surgeon: ESTEBAN Gomez III, MD;  Location: [facility] CATH LAB;  Service: Peripheral Vascular;  Laterality: N/A;    PERCUTANEOUS TRANSLUMINAL ANGIOPLASTY N/A 3/4/2021    Procedure: PTA (ANGIOPLASTY, PERCUTANEOUS, TRANSLUMINAL);  Surgeon: Teddy Huber MD;  Location: [facility] OR;  Service: Vascular;  Laterality: N/A;    REMOVAL OF ARTERIOVENOUS GRAFT Left 5/27/2021    Procedure: REMOVAL, GRAFT, LEFT LOWER EXTREMITY, WOUND EXPLORATION;  Surgeon: Teddy Huber MD;  Location: [facility] OR [facility] FLR;  Service: Vascular;  Laterality: Left;    REMOVAL OF NAIL OF DIGIT Left 3/9/2021    Procedure: REMOVAL, NAIL, DIGIT;  Surgeon: Rosio Mayes DPM;  Location: [facility] OR;  Service: Podiatry;  Laterality: Left;    THROMBECTOMY Left 3/4/2021    Procedure: THROMBECTOMY, LEFT LOWER EXTREMITY BYPASS GRAFT, ANGIOGRAM, POSSIBLE INTERVENTION, POSSIBLE LEFT LOWER EXTREMITY BYPASS;  Surgeon: Teddy Huber MD;  Location: [facility] OR;  Service: Vascular;  Laterality: Left;    THROMBECTOMY Left 4/29/2021    Procedure: GRAFT THROMBECTOMY, LEFT LOWER EXTREMITY;  Surgeon: Teddy Huber MD;  Location: [facility] OR 2ND FLR;  Service: Vascular;  Laterality: Left;  14.5 min  1179.85 mGy  341.01 Gycm2  240 ml dye    THROMBECTOMY  10/22/2021    Procedure: THROMBECTOMY;  Surgeon: Saad Arenas MD;  Location: [facility] CATH LAB/EP;  Service:  Cardiology;;     FAMILY MEDICAL HISTORY:  Family History   Problem Relation Age of Onset    Diabetes Mother     Diabetes Father     Heart disease Maternal Grandmother     No Known Problems Maternal Grandfather     Diabetes Paternal Grandmother     No Known Problems Paternal Grandfather     Anesthesia problems Neg Hx      SOCIAL HISTORY:  Social History     Tobacco Use    Smoking status: Former Smoker    Smokeless tobacco: Never Used   Substance Use Topics    Alcohol use: No    Drug use: Yes     Types: Marijuana     Comment: occassional     ALLERGIES:  Review of patient's allergies indicates:   Allergen Reactions    Ciprofloxacin Itching    Contrast media      Kidney injury    Iodine      Kidney injury     HOME MEDICATIONS:  Prior to Admission medications    Medication Sig Start Date End Date Taking? Authorizing Provider   acetaminophen (TYLENOL) 500 MG tablet Take 2 tablets (1,000 mg total) by mouth every 8 (eight) hours as needed for Pain. 6/25/21   Ruma Schulte MD   albuterol (PROVENTIL) 2.5 mg /3 mL (0.083 %) nebulizer solution INHALE 3MLS EVERY 4 HOURS AS NEEDED FOR SHORTNESS OF BREATH FOR UP TO 30 DAYS 5/3/22   Ritika South DO   allopurinoL (ZYLOPRIM) 100 MG tablet Take 1 tablet (100 mg total) by mouth once daily. 7/31/20   Hitesh Mason MD   amitriptyline (ELAVIL) 10 MG tablet Take 1 tablet (10 mg total) by mouth nightly as needed for Pain. 3/22/22 3/22/23  Catrachito Macias MD   aspirin (ECOTRIN) 81 MG EC tablet Take 81 mg by mouth once daily at 6am.    Historical Provider   atorvastatin (LIPITOR) 80 MG tablet TAKE 1 TABLET(80 MG) BY MOUTH EVERY NIGHT 5/3/22   Ritika South DO   blood-glucose meter,continuous (DEXCOM G6 ) Misc Use to check blood sugars 4 times/day 5/17/22   Tasha Loco MD   blood-glucose sensor (DEXCOM G6 SENSOR) Radha Apply one sensor to skin every 10 days 5/17/22   Tasha Loco MD   blood-glucose transmitter (DEXCOM G6 TRANSMITTER) Radha Replace  transmitter every 3 months to use with dexcom sensor 5/17/22   Tasha Loco MD   carvediloL (COREG) 3.125 MG tablet TAKE 1 TABLET(3.125 MG) BY MOUTH TWICE DAILY WITH BREAKFAST AND DINNER 5/3/22   Ritika South DO   clopidogreL (PLAVIX) 75 mg tablet TAKE 1 TABLET(75 MG) BY MOUTH DAILY 5/3/22   Ritika South,    furosemide (LASIX) 40 MG tablet Take 1 tablet (40 mg total) by mouth once daily. 5/21/22 5/21/23  Sue Hall MD   insulin detemir U-100 (LEVEMIR FLEXTOUCH) 100 unit/mL (3 mL) SubQ InPn pen Inject 12 Units into the skin every evening. 1/29/22 1/29/23  Tawnya Montes MD   insulin glargine (LANTUS) 100 unit/mL injection Inject 8 Units into the skin every evening.    Historical Provider   magnesium oxide (MAG-OX) 400 mg (241.3 mg magnesium) tablet Take 1 tablet (400 mg total) by mouth 2 (two) times daily. 6/25/21   Ruma Schulte MD   melatonin (MELATIN) 3 mg tablet Take 2 tablets (6 mg total) by mouth nightly as needed for Insomnia. 6/25/21   Ruma Schulte MD   multivit/folic acid/vit K1 (ONE-A-DAY WOMEN'S 50 PLUS ORAL) Take 1 tablet by mouth Daily.    Historical Provider   NIFEdipine (PROCARDIA-XL) 30 MG (OSM) 24 hr tablet Take 1 tablet (30 mg total) by mouth once daily. 3/17/22 3/17/23  Hitesh Quijano MD   NOVOLOG FLEXPEN U-100 INSULIN 100 unit/mL (3 mL) InPn pen INJECT 5 UNITS INTO SKIN THREE TIMES DAILY WITH MEALS PLUS SLIDING SCALE FOR MAX DAILY DOSE OF 25 UNITS, HOLD IF NOT EATING  Patient taking differently: 8 Units. INJECT 8 UNITS INTO SKIN THREE TIMES DAILY WITH MEALS PLUS SLIDING SCALE FOR MAX DAILY DOSE OF 25 UNITS, HOLD IF NOT EATING 6/15/22   Fawad Jameson MD   ondansetron (ZOFRAN-ODT) 8 MG TbDL Dissolve 1 tablet (8 mg total) by mouth every 8 (eight) hours as needed. 2/3/22   Tracy Blair MD   oxyCODONE (ROXICODONE) 5 MG immediate release tablet Take 1 tablet (5 mg total) by mouth every 6 (six) hours as needed for Pain. 3/22/22   Catrachito Macias MD    pantoprazole (PROTONIX) 40 MG tablet TAKE 1 TABLET(40 MG) BY MOUTH DAILY 5/3/22   Ritika South DO   pregabalin (LYRICA) 75 MG capsule Take 1 capsule (75 mg total) by mouth 3 (three) times daily. 3/22/22 9/20/22  Catrachito Macias MD   rivaroxaban (XARELTO) 10 mg Tab Take 2.5 mg by mouth nightly.    Historical Provider   sertraline (ZOLOFT) 50 MG tablet TAKE 1 TABLET(50 MG) BY MOUTH DAILY 5/3/22   Ritika South DO   sodium bicarbonate 650 MG tablet Take 1 tablet (650 mg total) by mouth 2 (two) times daily. Please hold until follow up with Primary Care Provider 2/3/22 2/3/23  Tracy Blair MD   lancing device Misc 1 Device by Misc.(Non-Drug; Combo Route) route 2 (two) times daily with meals. 5/7/18 2/24/21  Rosales Barton MD     CURRENT SCHEDULED MEDICATIONS:   allopurinoL  100 mg Oral Daily    aspirin  81 mg Oral Daily    atorvastatin  80 mg Oral QHS    carvediloL  3.125 mg Oral BID    clopidogreL  75 mg Oral Daily    insulin detemir U-100  12 Units Subcutaneous QHS    magnesium oxide  400 mg Oral BID    meropenem (MERREM) IVPB  500 mg Intravenous Q12H    NIFEdipine  30 mg Oral Daily    pantoprazole  40 mg Oral Daily    polyethylene glycol  17 g Oral Daily    pregabalin  75 mg Oral TID    rivaroxaban  2.5 mg Oral Nightly    sertraline  50 mg Oral Daily     CURRENT INFUSIONS:   sodium chloride 0.9% 100 mL/hr at 08/14/22 0300     CURRENT PRN MEDICATIONS:  acetaminophen, acetaminophen, aluminum-magnesium hydroxide-simethicone, amitriptyline, dextrose 10%, dextrose 10%, dextrose 50%, dextrose 50%, glucose, glucose, insulin aspart U-100, magnesium oxide, magnesium oxide, melatonin, naloxone, ondansetron, oxyCODONE, potassium bicarbonate, potassium bicarbonate, potassium bicarbonate, potassium, sodium phosphates, potassium, sodium phosphates, potassium, sodium phosphates, simethicone, sodium chloride 0.9%    REVIEW OF SYSTEMS:  Please refer to the HPI for all pertinent positive and negative findings. A  "comprehensive review of all other systems was negative.       PHYSICAL EXAM:  Patient Vitals for the past 24 hrs:   BP Temp Temp src Pulse Resp SpO2 Height Weight   08/14/22 0745 139/69 98.6 °F (37 °C) -- 94 18 99 % -- --   08/14/22 0308 (!) 110/56 97 °F (36.1 °C) Tympanic 88 18 (!) 93 % -- --   08/13/22 2333 (!) 118/55 98.1 °F (36.7 °C) Oral 82 18 98 % 5' 5" (1.651 m) 71.8 kg (158 lb 4.6 oz)   08/13/22 2253 (!) 109/54 -- -- 89 -- 100 % -- --   08/13/22 2243 (!) 108/51 -- -- 89 -- 100 % -- --   08/13/22 2223 (!) 103/54 -- -- 92 -- 98 % -- --   08/13/22 2207 (!) 116/55 -- -- 92 -- 100 % -- --   08/13/22 2140 (!) 84/46 -- -- 86 -- 100 % -- --   08/13/22 2113 116/63 -- -- 87 -- 100 % -- --   08/13/22 2043 (!) 110/57 -- -- 84 -- 100 % -- --   08/13/22 2032 (!) 91/50 -- -- 81 -- 100 % -- --   08/13/22 2013 (!) 86/51 -- -- 82 -- 100 % -- --   08/13/22 2003 (!) 82/44 -- -- 83 -- 100 % -- --   08/13/22 1953 (!) 72/37 -- -- 82 -- 99 % -- --   08/13/22 1944 (!) 79/43 -- -- 84 -- 100 % -- --   08/13/22 1942 (!) 75/36 -- -- 82 -- 100 % -- --   08/13/22 1941 (!) 74/35 -- -- 81 -- 100 % -- --   08/13/22 1915 (!) 90/50 -- -- 83 -- 100 % -- --   08/13/22 1909 (!) 95/35 -- -- 82 -- 100 % -- --   08/13/22 1832 (!) 100/50 -- -- 83 -- 100 % -- --   08/13/22 1819 -- -- -- 83 -- 100 % -- --   08/13/22 1817 (!) 105/59 -- -- 83 -- -- -- --   08/13/22 1753 (!) 98/54 -- -- 86 -- 100 % -- --   08/13/22 1741 (!) 92/46 -- -- 80 -- 100 % -- --   08/13/22 1738 (!) 97/41 -- -- 82 -- 100 % -- --   08/13/22 1736 -- 98.1 °F (36.7 °C) -- 81 16 -- -- 70.3 kg (155 lb)     GENERAL APPEARANCE: Alert, well-developed, well-nourished female in no acute distress.  HEENT: Normocephalic and atraumatic. PERRL. Oropharynx unremarkable.  PULM: Normal respiratory effort. No accessory muscle use.  CV: RRR.  ABDOMEN: Soft, nontender.  EXTREMITIES:  Bilateral lower extremity AKA  SKIN: Clear; no rashes, lesions or skin breaks in exposed areas.     NEURO:  MENTAL " STATUS: Alert and oriented to self , place and time except for month.  She is able to follow commands appropriately and provide some history.  Affect labile.     CRANIAL NERVES:  CN I: Not tested.  CN II: Fundoscopic exam deferred.  CN III, IV, VI: Pupils equal, round and reactive to light.  Extraocular movements full and intact.  CN V: Facial sensation normal.  CN VII: Facial asymmetry absent.  CN VIII: Hearing grossly normal and equal bilaterally.  No skew deviation or pathologic nystagmus.  CN IX, X: Palate elevates symmetrically. Speech/articulation is clear without dysarthria.  CN XI: Shoulder shrug and chin rotation equal with good strength.  CN XII: Tongue protrusion midline.     MOTOR:  Bulk normal. Tone normal and symmetric throughout.  Abnormal movements absent.  Tremor: none present.  Strength 5/5 throughout.     REFLEXES:  2+ bilateral upper extremities.     SENSATION:grossly intact throughout.  COORDINATION: normal finger-to-nose.  STATION: could not be tested.  GAIT: could not be tested.       Labs:  Recent Labs   Lab 08/11/22 0536 08/13/22 1747 08/14/22  0617   * 132* 132*   K 3.1* 4.0 4.3    104 106   CO2 15* 17* 14*   BUN 30* 42* 41*   CREATININE 2.0* 3.3* 3.1*   * 111* 363*   CALCIUM 8.4* 8.3* 7.9*   PHOS 2.8  --  4.0   MG 1.8  --  1.7     Recent Labs   Lab 08/11/22 0536 08/13/22 1747 08/14/22  0617   WBC 10.61 10.64 11.18   HGB 11.2* 10.5* 10.3*   HCT 33.0* 30.5* 31.7*    307 286     Recent Labs   Lab 08/11/22 0536 08/13/22 1747 08/14/22  0617   ALBUMIN 2.2* 2.1* 2.0*   PROT 6.1 5.9* 6.1   BILITOT 0.4 0.3 0.2   ALKPHOS 109 91 110   ALT 6* 7* 8*   AST 8* 10 11     Lab Results   Component Value Date    INR 1.1 03/15/2022     Lab Results   Component Value Date    TRIG 167 (H) 12/03/2021    HDL 55 12/03/2021    CHOLHDL 20.7 12/03/2021     Lab Results   Component Value Date    HGBA1C 6.9 (H) 03/26/2022     No results found for: PROTEINCSF, GLUCCSF, WBCCSF    Imaging:  I  have reviewed and interpreted the pertinent imaging and lab results.      CT Head Without Contrast  Narrative: EXAMINATION:  CT HEAD WITHOUT CONTRAST    CLINICAL HISTORY:  Mental status change, unknown cause; Disorientation, unspecified    TECHNIQUE:  Low dose axial images were obtained through the head.  Coronal and sagittal reformations were also performed. Contrast was not administered.    COMPARISON:  None.    FINDINGS:  There is cerebral atrophy with ventricular dilatation and diffuse periventricular white matter changes.  There is no evidence acute intracranial hemorrhage.  The osseous structures are unremarkable.    There is a meningioma adjacent to the left temporal lobe.  Impression: There is no evidence acute intracranial process.  There is cerebral atrophy with ventricular dilatation and periventricular white matter changes.  The degree of ventricular dilatation is similar to what was seen on MRI from 08/11/2022.    Electronically signed by: Leann Cortez MD  Date:    08/14/2022  Time:    07:08  X-Ray Chest AP Portable  Narrative: EXAMINATION:  XR CHEST AP PORTABLE    CLINICAL HISTORY:  Disorientation, unspecified    TECHNIQUE:  Single frontal view of the chest was performed.    COMPARISON:  03/26/2022    FINDINGS:  There are linear regions of atelectasis seen within bilateral lower lung zones increased from what was seen on 03/26/2022.  Impression: There are linear regions of atelectasis and bilateral lower lung zones.    Electronically signed by: Leann Cortez MD  Date:    08/14/2022  Time:    07:00         ASSESSMENT & PLAN:    Encephalopathy      Plan:  · Etiology of encephalopathy is unknown at this time.  Patient was hypotensive on admission which could be the possible cause versus metabolic encephalopathy.  · MRI brain was done showed hyperintensity in bilateral parieto-occipital periventricular distribution and appearance similar to leukodystrophy.  MRI findings have not changed in the last  2 years.  · Recommend outpatient workup for leukodystrophies with genetic testing and very long chain fatty acid testing  · Currently patient mental status has improved as per her  at bedside, patient is almost back to normal mental status.  · Neuro checks per unit protocol.  · Continue with aspirin and Lipitor for secondary stroke prevention.  · Will follow as needed.  Please call with any questions      Thank you kindly for including us in the care of this patient. Please do not hesitate to contact us with any questions.        45 minutes of care time has been spent evaluating with the patient. Time includes chart review not limited to diagnostic imaging, labs, and vitals, patient assessment, discussion with family and nursing, current order evaluations, and new order entries.       Emanuel Arthur MD  Neurology/vascular Neurology  Date of Service: 08/14/2022  9:42 AM        Please note: This note was transcribed using voice recognition software. Because of this technology there are often uinintended grammatical, spelling, and other transcription errors. Please disregard these errors.

## 2022-08-14 NOTE — NURSING
Nurses Note -- 4 Eyes      8/14/2022   1:00 AM      Skin assessed during: Admit      [x] No Pressure Injuries Present    [x]Prevention Measures Documented      [] Yes- Altered Skin Integrity Present or Discovered   [] LDA Added if Not in Epic (Describe Wound)   [] New Altered Skin Integrity was Present on Admit and Documented in LDA   [] Wound Image Taken    Wound Care Consulted? No    Attending Nurse:  Aneudy Garcia RN     Second RN/Staff Member:  Brandi Garcia RN

## 2022-08-14 NOTE — ASSESSMENT & PLAN NOTE
Patient with acute kidney injury likely d/t Pre-renal azotemia. ROSLYN is currently stable. Labs reviewed- Renal function/electrolytes with Estimated Creatinine Clearance: 18.9 mL/min (A) (based on SCr of 3.3 mg/dL (H)). according to latest data. Monitor urine output and serial BMP and adjust therapy as needed. Avoid nephrotoxins and renally dose meds for GFR listed above.

## 2022-08-14 NOTE — ASSESSMENT & PLAN NOTE
Follow urine culture  Meropenem from zosyn due to kidney injury and hx of ESBL and MDRO, historical culture showed sensitivity

## 2022-08-14 NOTE — HPI
Suyapa Connelly is a 56 year old female with a past medical history of HTN, DM, CKD, CAD and past surgical history of bilateral AKA due to PAD and CABG, who presented to the ED with confusion and unresponsiveness. She was admitted three days ago for confusion with a CT notable for hydrocephalus, but her  signed her out AMA as he felt she had improved enough to take her home and no longer wanted her to be in the hospital, despite her persistent confusion. She was responsive and communicating more than she was on initial admit, so he said he felt he could care for her better at home. They went to [facility] today, where she started drooling and became confused and unresponsive. She denied any complaints prior. She denies complaint at this time.     Upon arrival to the ED, she was minimally responsive. Staff performed a sternal rub, which woke her up quickly. She was hypotensive and received IV boluses, which improved her blood pressure and mentation. She is currently awake, alert and oriented to self, place and situation. She is not correct with dates. Dr. Arthur with Neurology was consulted by the ED, and did not recommend emergent treatment. He did say there was a possibility of the patient having a lumbar puncture at some point tomorrow. Urinalysis was positive for infection. Her lactic acid level was within normal limits. She has an acute on chronic kidney injury. CBC shows chronic anemia. Hospital Medicine consulted for admission and further management.

## 2022-08-14 NOTE — ASSESSMENT & PLAN NOTE
Chronic, controlled.  Latest blood pressure and vitals reviewed-   Temp:  [98.1 °F (36.7 °C)]   Pulse:  [80-92]   Resp:  [16-18]   BP: ()/(35-63)   SpO2:  [98 %-100 %] .   Home meds for hypertension were reviewed and noted below.   Hypertension Medications             carvediloL (COREG) 3.125 MG tablet TAKE 1 TABLET(3.125 MG) BY MOUTH TWICE DAILY WITH BREAKFAST AND DINNER    furosemide (LASIX) 40 MG tablet Take 1 tablet (40 mg total) by mouth once daily.    NIFEdipine (PROCARDIA-XL) 30 MG (OSM) 24 hr tablet Take 1 tablet (30 mg total) by mouth once daily.          While in the hospital, will manage blood pressure as follows; Continue home antihypertensive regimen, hold losartan    Will utilize p.r.n. blood pressure medication only if patient's blood pressure greater than  180/110 and she develops symptoms such as worsening chest pain or shortness of breath.

## 2022-08-14 NOTE — ASSESSMENT & PLAN NOTE
Patient with Paroxysmal (<7 days) atrial fibrillation which is controlled currently with Beta Blocker and Calcium Channel Blocker. Patient is currently in sinus rhythm.[id number] Score: 3. HASBLED Score: Unable to calculate. Anticoagulation indicated. Anticoagulation done with xarelto.

## 2022-08-14 NOTE — NURSING
Patient arrived via stretcher at this time. Central line noted to left neck.  IV noted to right neck.  IV noted to right hand.  Patient is oriented at this time but does not always make sense with comments.  Patient has trouble using her phone.  Attempted to call  to update of admission, but no one answered.  Patient spoke with her mother on her cell phone.  Patient oriented to room, call light and fall precautions.  Bed alarm in place at this time.

## 2022-08-14 NOTE — PROGRESS NOTES
Pharmacist Renal Dose Adjustment Note    Suyapa Connelly is a 56 y.o. female being treated with the medication MEREM    Patient Data:    Vital Signs (Most Recent):  Temp: 98.1 °F (36.7 °C) (08/13/22 2333)  Pulse: 82 (08/13/22 2333)  Resp: 18 (08/13/22 2333)  BP: (!) 118/55 (08/13/22 2333)  SpO2: 98 % (08/13/22 2333)   Vital Signs (72h Range):  Temp:  [98.1 °F (36.7 °C)]   Pulse:  [80-92]   Resp:  [16-18]   BP: ()/(35-63)   SpO2:  [98 %-100 %]      Recent Labs   Lab 08/10/22  2309 08/11/22  0536 08/13/22  1747   CREATININE 2.5* 2.0* 3.3*     Serum creatinine: 3.3 mg/dL (H) 08/13/22 1747  Estimated creatinine clearance: 18.9 mL/min (A)    Medication:MEREM dose: 1G frequency Q8H will be changed to medication:MEREM dose:500MG frequency:Q12H    Pharmacist's Name: Arpan Braden  Pharmacist's Extension: [phone]

## 2022-08-14 NOTE — ASSESSMENT & PLAN NOTE
Patient is chronically on statin.will continue for now. Monitor clinically. Last LDL was   Lab Results   Component Value Date    LDLCALC 177.6 (H) 12/03/2021

## 2022-08-14 NOTE — ASSESSMENT & PLAN NOTE
Patient's anemia is currently controlled. Has not received any PRBCs to date.. Etiology likely d/t chronic disease  Current CBC reviewed-   Lab Results   Component Value Date    HGB 10.5 (L) 08/13/2022    HCT 30.5 (L) 08/13/2022     Monitor serial CBC and transfuse if patient becomes hemodynamically unstable, symptomatic or H/H drops below 7/21.

## 2022-08-15 LAB
ALBUMIN SERPL BCP-MCNC: 1.9 G/DL (ref 3.5–5.2)
ALP SERPL-CCNC: 99 U/L (ref 55–135)
ALT SERPL W/O P-5'-P-CCNC: 8 U/L (ref 10–44)
ANION GAP SERPL CALC-SCNC: 9 MMOL/L (ref 8–16)
AST SERPL-CCNC: 10 U/L (ref 10–40)
BACTERIA UR CULT: NO GROWTH
BASOPHILS # BLD AUTO: 0.03 K/UL (ref 0–0.2)
BASOPHILS NFR BLD: 0.3 % (ref 0–1.9)
BILIRUB SERPL-MCNC: 0.3 MG/DL (ref 0.1–1)
BUN SERPL-MCNC: 41 MG/DL (ref 6–20)
CALCIUM SERPL-MCNC: 7.6 MG/DL (ref 8.7–10.5)
CHLORIDE SERPL-SCNC: 109 MMOL/L (ref 95–110)
CO2 SERPL-SCNC: 14 MMOL/L (ref 23–29)
CREAT SERPL-MCNC: 2.7 MG/DL (ref 0.5–1.4)
DIFFERENTIAL METHOD: ABNORMAL
EOSINOPHIL # BLD AUTO: 0.2 K/UL (ref 0–0.5)
EOSINOPHIL NFR BLD: 2.2 % (ref 0–8)
ERYTHROCYTE [DISTWIDTH] IN BLOOD BY AUTOMATED COUNT: 14.2 % (ref 11.5–14.5)
EST. GFR  (NO RACE VARIABLE): 20 ML/MIN/1.73 M^2
GLUCOSE SERPL-MCNC: 167 MG/DL (ref 70–110)
HCT VFR BLD AUTO: 26.2 % (ref 37–48.5)
HGB BLD-MCNC: 8.8 G/DL (ref 12–16)
IMM GRANULOCYTES # BLD AUTO: 0.09 K/UL (ref 0–0.04)
IMM GRANULOCYTES NFR BLD AUTO: 1 % (ref 0–0.5)
LYMPHOCYTES # BLD AUTO: 1 K/UL (ref 1–4.8)
LYMPHOCYTES NFR BLD: 10.8 % (ref 18–48)
MAGNESIUM SERPL-MCNC: 1.6 MG/DL (ref 1.6–2.6)
MCH RBC QN AUTO: 30.1 PG (ref 27–31)
MCHC RBC AUTO-ENTMCNC: 33.6 G/DL (ref 32–36)
MCV RBC AUTO: 90 FL (ref 82–98)
MONOCYTES # BLD AUTO: 1 K/UL (ref 0.3–1)
MONOCYTES NFR BLD: 10.8 % (ref 4–15)
NEUTROPHILS # BLD AUTO: 6.6 K/UL (ref 1.8–7.7)
NEUTROPHILS NFR BLD: 74.9 % (ref 38–73)
NRBC BLD-RTO: 0 /100 WBC
PHOSPHATE SERPL-MCNC: 3.8 MG/DL (ref 2.7–4.5)
PLATELET # BLD AUTO: 237 K/UL (ref 150–450)
PMV BLD AUTO: 11.4 FL (ref 9.2–12.9)
POCT GLUCOSE: 173 MG/DL (ref 70–110)
POCT GLUCOSE: 207 MG/DL (ref 70–110)
POCT GLUCOSE: 266 MG/DL (ref 70–110)
POCT GLUCOSE: 276 MG/DL (ref 70–110)
POCT GLUCOSE: 347 MG/DL (ref 70–110)
POTASSIUM SERPL-SCNC: 4.1 MMOL/L (ref 3.5–5.1)
PROT SERPL-MCNC: 5.5 G/DL (ref 6–8.4)
RBC # BLD AUTO: 2.92 M/UL (ref 4–5.4)
SODIUM SERPL-SCNC: 132 MMOL/L (ref 136–145)
WBC # BLD AUTO: 8.8 K/UL (ref 3.9–12.7)

## 2022-08-15 PROCEDURE — C9399 UNCLASSIFIED DRUGS OR BIOLOG: HCPCS | Performed by: STUDENT IN AN ORGANIZED HEALTH CARE EDUCATION/TRAINING PROGRAM

## 2022-08-15 PROCEDURE — 83735 ASSAY OF MAGNESIUM: CPT | Performed by: NURSE PRACTITIONER

## 2022-08-15 PROCEDURE — 25000003 PHARM REV CODE 250: Performed by: STUDENT IN AN ORGANIZED HEALTH CARE EDUCATION/TRAINING PROGRAM

## 2022-08-15 PROCEDURE — 36415 COLL VENOUS BLD VENIPUNCTURE: CPT | Performed by: NURSE PRACTITIONER

## 2022-08-15 PROCEDURE — 25000003 PHARM REV CODE 250: Performed by: NURSE PRACTITIONER

## 2022-08-15 PROCEDURE — 80053 COMPREHEN METABOLIC PANEL: CPT | Performed by: NURSE PRACTITIONER

## 2022-08-15 PROCEDURE — 84100 ASSAY OF PHOSPHORUS: CPT | Performed by: NURSE PRACTITIONER

## 2022-08-15 PROCEDURE — 85025 COMPLETE CBC W/AUTO DIFF WBC: CPT | Performed by: NURSE PRACTITIONER

## 2022-08-15 PROCEDURE — 63600175 PHARM REV CODE 636 W HCPCS: Performed by: NURSE PRACTITIONER

## 2022-08-15 PROCEDURE — 12000002 HC ACUTE/MED SURGE SEMI-PRIVATE ROOM

## 2022-08-15 PROCEDURE — 87086 URINE CULTURE/COLONY COUNT: CPT | Performed by: INTERNAL MEDICINE

## 2022-08-15 RX ADMIN — PREGABALIN 75 MG: 75 CAPSULE ORAL at 09:08

## 2022-08-15 RX ADMIN — MEROPENEM AND SODIUM CHLORIDE 500 MG: 500 INJECTION, SOLUTION INTRAVENOUS at 04:08

## 2022-08-15 RX ADMIN — INSULIN ASPART 2 UNITS: 100 INJECTION, SOLUTION INTRAVENOUS; SUBCUTANEOUS at 08:08

## 2022-08-15 RX ADMIN — INSULIN ASPART 2 UNITS: 100 INJECTION, SOLUTION INTRAVENOUS; SUBCUTANEOUS at 04:08

## 2022-08-15 RX ADMIN — INSULIN ASPART 8 UNITS: 100 INJECTION, SOLUTION INTRAVENOUS; SUBCUTANEOUS at 07:08

## 2022-08-15 RX ADMIN — SERTRALINE HYDROCHLORIDE 50 MG: 50 TABLET ORAL at 09:08

## 2022-08-15 RX ADMIN — INSULIN DETEMIR 16 UNITS: 100 INJECTION, SOLUTION SUBCUTANEOUS at 08:08

## 2022-08-15 RX ADMIN — CARVEDILOL 3.12 MG: 3.12 TABLET, FILM COATED ORAL at 08:08

## 2022-08-15 RX ADMIN — Medication 400 MG: at 09:08

## 2022-08-15 RX ADMIN — CLOPIDOGREL BISULFATE 75 MG: 75 TABLET, FILM COATED ORAL at 09:08

## 2022-08-15 RX ADMIN — RIVAROXABAN 2.5 MG: 2.5 TABLET, FILM COATED ORAL at 08:08

## 2022-08-15 RX ADMIN — Medication 400 MG: at 08:08

## 2022-08-15 RX ADMIN — NIFEDIPINE 30 MG: 30 TABLET, FILM COATED, EXTENDED RELEASE ORAL at 09:08

## 2022-08-15 RX ADMIN — POLYETHYLENE GLYCOL 3350 17 G: 17 POWDER, FOR SOLUTION ORAL at 09:08

## 2022-08-15 RX ADMIN — PREGABALIN 75 MG: 75 CAPSULE ORAL at 04:08

## 2022-08-15 RX ADMIN — PANTOPRAZOLE SODIUM 40 MG: 40 TABLET, DELAYED RELEASE ORAL at 09:08

## 2022-08-15 RX ADMIN — INSULIN ASPART 6 UNITS: 100 INJECTION, SOLUTION INTRAVENOUS; SUBCUTANEOUS at 11:08

## 2022-08-15 RX ADMIN — ATORVASTATIN CALCIUM 80 MG: 40 TABLET, FILM COATED ORAL at 08:08

## 2022-08-15 RX ADMIN — PREGABALIN 75 MG: 75 CAPSULE ORAL at 08:08

## 2022-08-15 RX ADMIN — SODIUM CHLORIDE: 0.9 INJECTION, SOLUTION INTRAVENOUS at 04:08

## 2022-08-15 RX ADMIN — ALLOPURINOL 100 MG: 100 TABLET ORAL at 09:08

## 2022-08-15 RX ADMIN — ASPIRIN 81 MG: 81 TABLET, COATED ORAL at 04:08

## 2022-08-15 RX ADMIN — CARVEDILOL 3.12 MG: 3.12 TABLET, FILM COATED ORAL at 09:08

## 2022-08-15 NOTE — ASSESSMENT & PLAN NOTE
Chronic, controlled.  Latest blood pressure and vitals reviewed-   Temp:  [97.8 °F (36.6 °C)-99.9 °F (37.7 °C)]   Pulse:  []   Resp:  [16-20]   BP: (100-130)/(56-80)   SpO2:  [97 %-100 %] .   Home meds for hypertension were reviewed and noted below.   Hypertension Medications             carvediloL (COREG) 3.125 MG tablet TAKE 1 TABLET(3.125 MG) BY MOUTH TWICE DAILY WITH BREAKFAST AND DINNER    furosemide (LASIX) 40 MG tablet Take 1 tablet (40 mg total) by mouth once daily.    NIFEdipine (PROCARDIA-XL) 30 MG (OSM) 24 hr tablet Take 1 tablet (30 mg total) by mouth once daily.          While in the hospital, will manage blood pressure as follows; Continue home antihypertensive regimen, hold losartan    Will utilize p.r.n. blood pressure medication only if patient's blood pressure greater than  180/110 and she develops symptoms such as worsening chest pain or shortness of breath.

## 2022-08-15 NOTE — PLAN OF CARE
Patient progressing towards discharge.    Patient had no acute events noted. Patient has periods of confusion still but remains oriented to person, place and sometimes situation.  Family remains at bedside.  IV Fluids and IV Antibiotics continued overnight per MD order.  Patient voiding via pure wick overnight.  Discussed POC with patient and family with verbalization of understanding.    Will continue to monitor.

## 2022-08-15 NOTE — PT/OT/SLP PROGRESS
Physical Therapy      Patient Name:  Suyapa Connelly   MRN:  [id number]    Patient not seen today secondary to patient sleeping and spouse requests PT evaluation deferred until tomorrow morning. Will follow-up 08/16/22.         quit 35 years ago

## 2022-08-15 NOTE — ASSESSMENT & PLAN NOTE
Patient's anemia is currently controlled. Has not received any PRBCs to date.. Etiology likely d/t chronic disease  Current CBC reviewed-   Lab Results   Component Value Date    HGB 8.8 (L) 08/15/2022    HCT 26.2 (L) 08/15/2022     Monitor serial CBC and transfuse if patient becomes hemodynamically unstable, symptomatic or H/H drops below 7/21.

## 2022-08-15 NOTE — PROGRESS NOTES
[facility] Hospital Medicine  Progress Note    Patient Name: Suyapa Connelly  MRN: [id number]  Patient Class: IP- Inpatient   Admission Date: 8/13/2022  Length of Stay: 2 days  Attending Physician: Caty Otero MD  Primary Care Provider: Magen Christensen MD        Subjective:     Principal Problem:Severe sepsis        HPI:   Suyapa Connelly is a 56 year old female with a past medical history of HTN, DM, CKD, CAD and past surgical history of bilateral AKA due to PAD and CABG, who presented to the ED with confusion and unresponsiveness. She was admitted three days ago for confusion with a CT notable for hydrocephalus, but her  signed her out AMA as he felt she had improved enough to take her home and no longer wanted her to be in the hospital, despite her persistent confusion. She was responsive and communicating more than she was on initial admit, so he said he felt he could care for her better at home. They went to [facility] today, where she started drooling and became confused and unresponsive. She denied any complaints prior. She denies complaint at this time.     Upon arrival to the ED, she was minimally responsive. Staff performed a sternal rub, which woke her up quickly. She was hypotensive and received IV boluses, which improved her blood pressure and mentation. She is currently awake, alert and oriented to self, place and situation. She is not correct with dates. Dr. Arthur with Neurology was consulted by the ED, and did not recommend emergent treatment. He did say there was a possibility of the patient having a lumbar puncture at some point tomorrow. Urinalysis was positive for infection. Her lactic acid level was within normal limits. She has an acute on chronic kidney injury. CBC shows chronic anemia. Hospital Medicine consulted for admission and further management.          Overview/Hospital Course:  No notes on file    Interval History: T-max 99.9° F. mental status improved to  "baseline.  Patient answering questions appropriately.  Denies any focal subjective complaints.  No abdominal pain or urinary difficulties reported.  Patient denies any new focal subjective complaints.    Review of Systems   Constitutional:  Positive for fatigue. Negative for chills and fever.   HENT:  Negative for congestion and sore throat.    Eyes:  Negative for visual disturbance.   Respiratory:  Negative for cough and shortness of breath.    Cardiovascular:  Negative for chest pain and palpitations.   Gastrointestinal:  Negative for abdominal pain, nausea and vomiting.   Endocrine: Negative for cold intolerance and heat intolerance.   Genitourinary:  Negative for dysuria and hematuria.   Musculoskeletal:  Negative for arthralgias and myalgias.   Skin:  Negative for pallor and rash.   Neurological:  Negative for tremors and seizures.   Hematological:  Negative for adenopathy. Does not bruise/bleed easily.   Psychiatric/Behavioral:  Positive for confusion ("i felt like my brain was messed up, like i was there but i wasnt there") and decreased concentration. Negative for hallucinations.    All other systems reviewed and are negative.  Objective:     Vital Signs (Most Recent):  Temp: 98.4 °F (36.9 °C) (08/15/22 0741)  Pulse: 96 (08/15/22 0741)  Resp: 18 (08/15/22 0741)  BP: (!) 123/56 (08/15/22 0741)  SpO2: 100 % (08/15/22 0741)   Vital Signs (24h Range):  Temp:  [97.8 °F (36.6 °C)-99.9 °F (37.7 °C)] 98.4 °F (36.9 °C)  Pulse:  [] 96  Resp:  [16-20] 18  SpO2:  [97 %-100 %] 100 %  BP: (100-130)/(56-80) 123/56     Weight: 71.8 kg (158 lb 4.6 oz)  Body mass index is 26.34 kg/m².    Intake/Output Summary (Last 24 hours) at 8/15/2022 0856  Last data filed at 8/15/2022 0500  Gross per 24 hour   Intake 2600.47 ml   Output 800 ml   Net 1800.47 ml      Physical Exam  Vitals and nursing note reviewed.   Constitutional:       General: She is awake. She is not in acute distress.     Appearance: She is well-developed. She " is obese. She is ill-appearing (chronically ill).   HENT:      Head: Normocephalic and atraumatic.      Mouth/Throat:      Lips: Pink.      Mouth: Mucous membranes are moist.   Eyes:      Conjunctiva/sclera: Conjunctivae normal.      Pupils: Pupils are equal, round, and reactive to light.   Neck:      Thyroid: No thyromegaly.      Vascular: No JVD.   Cardiovascular:      Rate and Rhythm: Normal rate and regular rhythm.      Heart sounds: Normal heart sounds, S1 normal and S2 normal. No murmur heard.    No friction rub. No gallop.   Pulmonary:      Effort: Pulmonary effort is normal.      Breath sounds: Normal breath sounds.   Abdominal:      General: Abdomen is protuberant. Bowel sounds are normal. There is no distension.      Palpations: Abdomen is soft. There is no mass.      Tenderness: There is no abdominal tenderness.   Musculoskeletal:         General: Normal range of motion.      Cervical back: Full passive range of motion without pain, normal range of motion and neck supple.      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      Left Lower Extremity: Left leg is amputated above knee.   Skin:     General: Skin is warm and dry.      Capillary Refill: Capillary refill takes 2 to 3 seconds.   Neurological:      General: No focal deficit present.      Mental Status: She is alert and oriented to person, place, and time.      Cranial Nerves: No cranial nerve deficit.      Sensory: Sensation is intact.      Motor: Motor function is intact.      Comments: Oriented to person, place and situation, unable to tell birthdate  Converses well, no dysarthria and no aphasia  Moves all extremities well with equal strength   Psychiatric:         Behavior: Behavior normal. Behavior is cooperative.       Significant Labs: All pertinent labs within the past 24 hours have been reviewed.  CBC:   Recent Labs   Lab 08/13/22  1747 08/14/22  0617 08/15/22  0402   WBC 10.64 11.18  8.80   HGB 10.5* 10.3* 8.8*   HCT 30.5* 31.7* 26.2*    286 237     CMP:   Recent Labs   Lab 08/13/22  1747 08/14/22  0617 08/15/22  0402   * 132* 132*   K 4.0 4.3 4.1    106 109   CO2 17* 14* 14*   * 363* 167*   BUN 42* 41* 41*   CREATININE 3.3* 3.1* 2.7*   CALCIUM 8.3* 7.9* 7.6*   PROT 5.9* 6.1 5.5*   ALBUMIN 2.1* 2.0* 1.9*   BILITOT 0.3 0.2 0.3   ALKPHOS 91 110 99   AST 10 11 10   ALT 7* 8* 8*   ANIONGAP 11 12 9     TSH:   Recent Labs   Lab 08/13/22  2204   TSH 0.885     Urine Culture:   Recent Labs   Lab 08/13/22 1855   LABURIN No growth     Urine Studies:   Recent Labs   Lab 08/13/22 1855   COLORU Yellow   APPEARANCEUA Cloudy*   PHUR 6.0   SPECGRAV 1.025   PROTEINUA 2+*   GLUCUA Negative   KETONESU Negative   BILIRUBINUA 1+*   OCCULTUA 3+*   NITRITE Negative   UROBILINOGEN Negative   LEUKOCYTESUR 3+*   RBCUA 12*   WBCUA >100*   BACTERIA Many*   SQUAMEPITHEL 1   HYALINECASTS 0     Microbiology Results (last 7 days)       Procedure Component Value Units Date/Time    Urine culture [[id number]] Collected: 08/13/22 1855    Order Status: Completed Specimen: Urine Updated: 08/15/22 0115     Urine Culture, Routine No growth    Narrative:      Specimen Source->Urine          Significant Imaging:   CT Head (08-10-22):  1. Suspected vasogenic edema in the bilateral parietooccipital lobes which can be seen with PRES.  Recommend clinical correlation and consider further evaluation with MRI.  2. Senescent changes and chronic microvascular ischemic changes.    MRI Brain:  The study is mildly motion degraded.  There is however no acute abnormality and little if any change compared to a prior brain MRI dated 06/02/2021.  The ventricles are mildly prominent but without definite change.  There is no midline shift.  There is at least moderate white matter change.  These findings however were present on the comparison MRI and without any significant or definite change.  Findings do not strongly suggest  PRES.  There is no hemorrhage, mass or acute infarction.    MRA Brain:  Mild irregularity of the left greater than right cavernous internal carotid arteries without significant atherosclerotic narrowing.  No other high-grade stenosis, large vessel occlusion or aneurysm.    MRA Neck:   Mild narrowing at bilateral carotid bifurcations with less than 50% stenosis.  Otherwise, unremarkable MRA of the neck vasculature.    CXR: There are linear regions of atelectasis and bilateral lower lung zones.    CT Head:  There is no evidence acute intracranial process.  There is cerebral atrophy with ventricular dilatation and periventricular white matter changes.  The degree of ventricular dilatation is similar to what was seen on MRI from 08/11/2022.    CXR: Left internal jugular central venous catheter placement.        Assessment/Plan:      * Severe sepsis  This patient does have evidence of infective focus  My overall impression is sepsis. Vital signs were reviewed and noted in progress note.  Antibiotics given-   Antibiotics (From admission, onward)            Start     Stop Route Frequency Ordered    08/14/22 0500  meropenem-0.9% sodium chloride 500 mg/50 mL IVPB         -- IV Every 12 hours (non-standard times) 08/14/22 0002        Cultures were taken-   Microbiology Results (last 7 days)     Procedure Component Value Units Date/Time    Urine culture [[id number]] Collected: 08/13/22 1855    Order Status: Completed Specimen: Urine Updated: 08/15/22 0115     Urine Culture, Routine No growth    Narrative:      Specimen Source->Urine        Latest lactate reviewed, they are-  Recent Labs   Lab 08/13/22  1747 08/13/22  2204   LACTATE 1.2 1.4       Organ dysfunction indicated by Acute kidney injury and Encephalopathy   Source- urine    Source control Achieved by- antibiotics      Communicating hydrocephalus  Consult Neuro-Dr Arthur    No emergent intervention   May perform LP  Seen on CT of head:  IMPRESSION:  1. Moderate  ventriculomegaly involving the lateral ventricles and 3rd ventricle demonstrating mild  progression since the oldest comparison 05/25/2021, disproportionate to the degree of peripheral  sulcal prominence and demonstrating increased apical sulcal effacement bilaterally. 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 Consider further assessment with LP and opening pressure, and neurologic or  neurosurgical consult. Nuclear medicine cisternography may be helpful in further assessment.  2. Gradually increasing periventricular white matter hypodensities are nonspecific and might  represent progression of microvascular ischemia in this age group, however in the setting of  hydronephrosis this raises concern for an element of transependymal fluid migration.  3. There are small chronic subependymal nodules in the anterior which are unchanged, favoring  benign lesions such as subependymal hamartomas. Correlate clinically for tuberous sclerosis.  4. Chronic densely calcified dural-based 14 mm lesion in the left middle cranial fossa is unchanged,  consistent with densely calcified meningioma or incidental dural ossification without significant mass  effect.  5. These findings initiated a results reporting process. An addendum will be issued at the time of  clinician notification.    Confusion  Transient, had episode of altered mental status that resolved with IV fluid hydration    Status post above-knee amputation of both lower extremities  Noted  Fall precautions      UTI (urinary tract infection)  Follow urine culture  Meropenem from zosyn due to kidney injury and hx of ESBL and MDRO, historical culture showed sensitivity      Acute renal failure superimposed on stage 3 chronic kidney disease    Patient with acute kidney injury likely d/t Pre-renal azotemia. ROSLYN is currently stable. Labs reviewed- Renal function/electrolytes with Estimated  Creatinine Clearance: 23.4 mL/min (A) (based on SCr of 2.7 mg/dL (H)). according to latest data. Monitor urine output and serial BMP and adjust therapy as needed. Avoid nephrotoxins and renally dose meds for GFR listed above.       Class 2 severe obesity due to excess calories with serious comorbidity in adult  Body mass index is 26.34 kg/m². Morbid obesity complicates all aspects of disease management from diagnostic modalities to treatment. Weight loss encouraged and health benefits explained to patient.         Impaired functional mobility and endurance  PT/OT consulted  Turn q2h  Assist with ADLs      Hyponatremia  Na on admit 132  Gentle IVF rehydration    Paroxysmal atrial fibrillation  Patient with Paroxysmal (<7 days) atrial fibrillation which is controlled currently with Beta Blocker and Calcium Channel Blocker. Patient is currently in sinus rhythm.[id number] Score: 3. HASBLED Score: Unable to calculate. Anticoagulation indicated. Anticoagulation done with xarelto.        Anemia  Patient's anemia is currently controlled. Has not received any PRBCs to date.. Etiology likely d/t chronic disease  Current CBC reviewed-   Lab Results   Component Value Date    HGB 8.8 (L) 08/15/2022    HCT 26.2 (L) 08/15/2022     Monitor serial CBC and transfuse if patient becomes hemodynamically unstable, symptomatic or H/H drops below 7/21.         CAD (coronary artery disease)    Patient with known CAD s/p CABG, which is controlled Will continue ASA, Plavix and Statin and monitor for S/Sx of angina/ACS. Continue to monitor on telemetry.       Type 2 diabetes mellitus with hyperglycemia, with long-term current use of insulin  Patient's FSGs are controlled on current medication regimen.  Last A1c reviewed-   Lab Results   Component Value Date    HGBA1C 6.9 (H) 03/26/2022     Most recent fingerstick glucose reviewed-   Recent Labs   Lab 08/14/22  1007 08/14/22  1542 08/15/22  0723   POCTGLUCOSE 266* 322* 347*     Current  correctional scale  Medium  Maintain anti-hyperglycemic dose as follows-   Antihyperglycemics (From admission, onward)            Start     Stop Route Frequency Ordered    08/14/22 2100  insulin detemir U-100 pen 16 Units         -- SubQ Nightly 08/14/22 1531    08/14/22 0111  insulin aspart U-100 pen 1-10 Units         -- SubQ Before meals & nightly PRN 08/14/22 0011        Hold Oral hypoglycemics while patient is in the hospital.    Mixed hyperlipidemia   Patient is chronically on statin.will continue for now. Monitor clinically. Last LDL was   Lab Results   Component Value Date    LDLCALC 177.6 (H) 12/03/2021            CAROLIN (generalized anxiety disorder)  Noted, chronic      Essential hypertension  Chronic, controlled.  Latest blood pressure and vitals reviewed-   Temp:  [97.8 °F (36.6 °C)-99.9 °F (37.7 °C)]   Pulse:  []   Resp:  [16-20]   BP: (100-130)/(56-80)   SpO2:  [97 %-100 %] .   Home meds for hypertension were reviewed and noted below.   Hypertension Medications             carvediloL (COREG) 3.125 MG tablet TAKE 1 TABLET(3.125 MG) BY MOUTH TWICE DAILY WITH BREAKFAST AND DINNER    furosemide (LASIX) 40 MG tablet Take 1 tablet (40 mg total) by mouth once daily.    NIFEdipine (PROCARDIA-XL) 30 MG (OSM) 24 hr tablet Take 1 tablet (30 mg total) by mouth once daily.          While in the hospital, will manage blood pressure as follows; Continue home antihypertensive regimen, hold losartan    Will utilize p.r.n. blood pressure medication only if patient's blood pressure greater than  180/110 and she develops symptoms such as worsening chest pain or shortness of breath.        Moderate malnutrition  Nutrition consulted. Most recent weight and BMI monitored-                         Measurements:  Wt Readings from Last 1 Encounters:   08/13/22 71.8 kg (158 lb 4.6 oz)   Body mass index is 26.34 kg/m².    Recommendations:      Patient has been screened and assessed by RD. RD will follow patient.      Start  physical therapy.  Discussed with Dr. Arthur. Patient needs further outpatient work by neurology for evaluation of leukodystrophy.   VTE Risk Mitigation (From admission, onward)         Ordered     IP VTE HIGH RISK PATIENT  Once         08/13/22 2128                Discharge Planning   DEVAN:  8/16/22     Code Status: Full Code   Is the patient medically ready for discharge?:     Reason for patient still in hospital (select all that apply): Patient trending condition and Consult recommendations  Discharge Plan A: Home with family                  Caty Otero MD  Department of Hospital Medicine   [facility]

## 2022-08-15 NOTE — ASSESSMENT & PLAN NOTE
Nutrition consulted. Most recent weight and BMI monitored-                         Measurements:  Wt Readings from Last 1 Encounters:   08/13/22 71.8 kg (158 lb 4.6 oz)   Body mass index is 26.34 kg/m².    Recommendations:      Patient has been screened and assessed by RD. RD will follow patient.

## 2022-08-15 NOTE — ASSESSMENT & PLAN NOTE
Patient with Paroxysmal (<7 days) atrial fibrillation which is controlled currently with Beta Blocker and Calcium Channel Blocker. Patient is currently in sinus rhythm.[id number] Score: 3. HASBLED Score: Unable to calculate. Anticoagulation indicated. Anticoagulation done with xarelto.

## 2022-08-15 NOTE — PLAN OF CARE
[facility]  Initial Discharge Assessment       Primary Care Provider: Magen Christensen MD    Admission Diagnosis: Confusion [R41.0]  Encounter for central line placement [Z45.2]  Chest pain [R07.9]  Confusion after a seizure [F05]  Severe sepsis [A41.9, R65.20]    Admission Date: 8/13/2022  Expected Discharge Date:  Assessment completed with pt and spouse Randell Connelly [phone] at bedside. Pt confirmed home address and denied HH . Pt uses a home wheelchair. Pharmacy of choice is [facility] in [place]. Pts PCP is Dr. Christensen and she has [employer] medicare and Medicaid. Pts discharge plan is home with family and her spouse will provide transport. CM following.     Discharge Barriers Identified: None    Payor: [employer]$ [employer] MEDICARE / Plan: [employer] MEDICARE HMO / Product Type: Capitation /     Extended Emergency Contact Information  Primary Emergency Contact: Randell Connelly  Address: [address] [place] [address]  Home Phone: [phone]  Relation: Spouse  Secondary Emergency Contact: GodwinCamdonavan  Address: [address] [place] [address]  Home Phone: [phone]  Relation: Daughter  Mother: Tea Sal   [place]  Mobile Phone: [phone]    Discharge Plan A: Home with family  Discharge Plan B: Home with family, Home Health      [id number] - [place], [place] - [address]  [address]  [place] [address]  Phone: [phone] Fax: [phone]      Initial Assessment (most recent)     Adult Discharge Assessment - 08/15/22 0847        Discharge Assessment    Assessment Type Discharge Planning Assessment     Confirmed/corrected address, phone number and insurance Yes     Confirmed Demographics Correct on Facesheet     Source of Information patient;family     Communicated DEVAN with patient/caregiver Yes     Reason For  Admission Confusion     Lives With spouse     Facility Arrived From: home     Do you expect to return to your current living situation? Yes     Do you have help at home or someone to help you manage your care at home? Yes     Who are your caregiver(s) and their phone number(s)? Randell Connelly [phone]     Prior to hospitilization cognitive status: Alert/Oriented     Current cognitive status: Alert/Oriented     Walking or Climbing Stairs Difficulty ambulation difficulty, requires equipment     Mobility Management wheelchair     Dressing/Bathing Difficulty none     Home Accessibility wheelchair accessible     Home Layout Able to live on 1st floor     Equipment Currently Used at Home wheelchair     Readmission within 30 days? No     Patient currently being followed by outpatient case management? No     Do you currently have service(s) that help you manage your care at home? No     Do you take prescription medications? Yes     Do you have prescription coverage? Yes     Do you have any problems affording any of your prescribed medications? No     Is the patient taking medications as prescribed? yes     Who is going to help you get home at discharge? Randell Connelly [phone]     How do you get to doctors appointments? family or friend will provide     Are you on dialysis? No     Do you take coumadin? No     Discharge Plan A Home with family     Discharge Plan B Home with family;Home Health     DME Needed Upon Discharge  none     Discharge Plan discussed with: Spouse/sig other;Patient     Name(s) and Number(s) Randell Connelly [phone]     Discharge Barriers Identified None        Relationship/Environment    Name(s) of Who Lives With Patient Randell Godwin [phone]

## 2022-08-15 NOTE — ASSESSMENT & PLAN NOTE
This patient does have evidence of infective focus  My overall impression is sepsis. Vital signs were reviewed and noted in progress note.  Antibiotics given-   Antibiotics (From admission, onward)            Start     Stop Route Frequency Ordered    08/14/22 0500  meropenem-0.9% sodium chloride 500 mg/50 mL IVPB         -- IV Every 12 hours (non-standard times) 08/14/22 0002        Cultures were taken-   Microbiology Results (last 7 days)     Procedure Component Value Units Date/Time    Urine culture [[id number]] Collected: 08/13/22 1855    Order Status: Completed Specimen: Urine Updated: 08/15/22 0115     Urine Culture, Routine No growth    Narrative:      Specimen Source->Urine        Latest lactate reviewed, they are-  Recent Labs   Lab 08/13/22  1747 08/13/22  2204   LACTATE 1.2 1.4       Organ dysfunction indicated by Acute kidney injury and Encephalopathy   Source- urine    Source control Achieved by- antibiotics

## 2022-08-15 NOTE — ASSESSMENT & PLAN NOTE
Patient with acute kidney injury likely d/t Pre-renal azotemia. ROSLYN is currently stable. Labs reviewed- Renal function/electrolytes with Estimated Creatinine Clearance: 23.4 mL/min (A) (based on SCr of 2.7 mg/dL (H)). according to latest data. Monitor urine output and serial BMP and adjust therapy as needed. Avoid nephrotoxins and renally dose meds for GFR listed above.

## 2022-08-15 NOTE — ASSESSMENT & PLAN NOTE
Body mass index is 26.34 kg/m². Morbid obesity complicates all aspects of disease management from diagnostic modalities to treatment. Weight loss encouraged and health benefits explained to patient.

## 2022-08-15 NOTE — SUBJECTIVE & OBJECTIVE
"Interval History: T-max 99.9° F. mental status improved to baseline.  Patient answering questions appropriately.  Denies any focal subjective complaints.  No abdominal pain or urinary difficulties reported.  Patient denies any new focal subjective complaints.    Review of Systems   Constitutional:  Positive for fatigue. Negative for chills and fever.   HENT:  Negative for congestion and sore throat.    Eyes:  Negative for visual disturbance.   Respiratory:  Negative for cough and shortness of breath.    Cardiovascular:  Negative for chest pain and palpitations.   Gastrointestinal:  Negative for abdominal pain, nausea and vomiting.   Endocrine: Negative for cold intolerance and heat intolerance.   Genitourinary:  Negative for dysuria and hematuria.   Musculoskeletal:  Negative for arthralgias and myalgias.   Skin:  Negative for pallor and rash.   Neurological:  Negative for tremors and seizures.   Hematological:  Negative for adenopathy. Does not bruise/bleed easily.   Psychiatric/Behavioral:  Positive for confusion ("i felt like my brain was messed up, like i was there but i wasnt there") and decreased concentration. Negative for hallucinations.    All other systems reviewed and are negative.  Objective:     Vital Signs (Most Recent):  Temp: 98.4 °F (36.9 °C) (08/15/22 0741)  Pulse: 96 (08/15/22 0741)  Resp: 18 (08/15/22 0741)  BP: (!) 123/56 (08/15/22 0741)  SpO2: 100 % (08/15/22 0741)   Vital Signs (24h Range):  Temp:  [97.8 °F (36.6 °C)-99.9 °F (37.7 °C)] 98.4 °F (36.9 °C)  Pulse:  [] 96  Resp:  [16-20] 18  SpO2:  [97 %-100 %] 100 %  BP: (100-130)/(56-80) 123/56     Weight: 71.8 kg (158 lb 4.6 oz)  Body mass index is 26.34 kg/m².    Intake/Output Summary (Last 24 hours) at 8/15/2022 0856  Last data filed at 8/15/2022 0500  Gross per 24 hour   Intake 2600.47 ml   Output 800 ml   Net 1800.47 ml      Physical Exam  Vitals and nursing note reviewed.   Constitutional:       General: She is awake. She is not in " acute distress.     Appearance: She is well-developed. She is obese. She is ill-appearing (chronically ill).   HENT:      Head: Normocephalic and atraumatic.      Mouth/Throat:      Lips: Pink.      Mouth: Mucous membranes are moist.   Eyes:      Conjunctiva/sclera: Conjunctivae normal.      Pupils: Pupils are equal, round, and reactive to light.   Neck:      Thyroid: No thyromegaly.      Vascular: No JVD.   Cardiovascular:      Rate and Rhythm: Normal rate and regular rhythm.      Heart sounds: Normal heart sounds, S1 normal and S2 normal. No murmur heard.    No friction rub. No gallop.   Pulmonary:      Effort: Pulmonary effort is normal.      Breath sounds: Normal breath sounds.   Abdominal:      General: Abdomen is protuberant. Bowel sounds are normal. There is no distension.      Palpations: Abdomen is soft. There is no mass.      Tenderness: There is no abdominal tenderness.   Musculoskeletal:         General: Normal range of motion.      Cervical back: Full passive range of motion without pain, normal range of motion and neck supple.      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      Left Lower Extremity: Left leg is amputated above knee.   Skin:     General: Skin is warm and dry.      Capillary Refill: Capillary refill takes 2 to 3 seconds.   Neurological:      General: No focal deficit present.      Mental Status: She is alert and oriented to person, place, and time.      Cranial Nerves: No cranial nerve deficit.      Sensory: Sensation is intact.      Motor: Motor function is intact.      Comments: Oriented to person, place and situation, unable to tell birthdate  Converses well, no dysarthria and no aphasia  Moves all extremities well with equal strength   Psychiatric:         Behavior: Behavior normal. Behavior is cooperative.       Significant Labs: All pertinent labs within the past 24 hours have been reviewed.  CBC:   Recent Labs   Lab  08/13/22  1747 08/14/22  0617 08/15/22  0402   WBC 10.64 11.18 8.80   HGB 10.5* 10.3* 8.8*   HCT 30.5* 31.7* 26.2*    286 237     CMP:   Recent Labs   Lab 08/13/22  1747 08/14/22  0617 08/15/22  0402   * 132* 132*   K 4.0 4.3 4.1    106 109   CO2 17* 14* 14*   * 363* 167*   BUN 42* 41* 41*   CREATININE 3.3* 3.1* 2.7*   CALCIUM 8.3* 7.9* 7.6*   PROT 5.9* 6.1 5.5*   ALBUMIN 2.1* 2.0* 1.9*   BILITOT 0.3 0.2 0.3   ALKPHOS 91 110 99   AST 10 11 10   ALT 7* 8* 8*   ANIONGAP 11 12 9     TSH:   Recent Labs   Lab 08/13/22  2204   TSH 0.885     Urine Culture:   Recent Labs   Lab 08/13/22 1855   LABURIN No growth     Urine Studies:   Recent Labs   Lab 08/13/22 1855   COLORU Yellow   APPEARANCEUA Cloudy*   PHUR 6.0   SPECGRAV 1.025   PROTEINUA 2+*   GLUCUA Negative   KETONESU Negative   BILIRUBINUA 1+*   OCCULTUA 3+*   NITRITE Negative   UROBILINOGEN Negative   LEUKOCYTESUR 3+*   RBCUA 12*   WBCUA >100*   BACTERIA Many*   SQUAMEPITHEL 1   HYALINECASTS 0     Microbiology Results (last 7 days)       Procedure Component Value Units Date/Time    Urine culture [[id number]] Collected: 08/13/22 1855    Order Status: Completed Specimen: Urine Updated: 08/15/22 0115     Urine Culture, Routine No growth    Narrative:      Specimen Source->Urine          Significant Imaging:   CT Head (08-10-22):  1. Suspected vasogenic edema in the bilateral parietooccipital lobes which can be seen with PRES.  Recommend clinical correlation and consider further evaluation with MRI.  2. Senescent changes and chronic microvascular ischemic changes.    MRI Brain:  The study is mildly motion degraded.  There is however no acute abnormality and little if any change compared to a prior brain MRI dated 06/02/2021.  The ventricles are mildly prominent but without definite change.  There is no midline shift.  There is at least moderate white matter change.  These findings however were present on the comparison MRI and without any  significant or definite change.  Findings do not strongly suggest PRES.  There is no hemorrhage, mass or acute infarction.    MRA Brain:  Mild irregularity of the left greater than right cavernous internal carotid arteries without significant atherosclerotic narrowing.  No other high-grade stenosis, large vessel occlusion or aneurysm.    MRA Neck:   Mild narrowing at bilateral carotid bifurcations with less than 50% stenosis.  Otherwise, unremarkable MRA of the neck vasculature.    CXR: There are linear regions of atelectasis and bilateral lower lung zones.    CT Head:  There is no evidence acute intracranial process.  There is cerebral atrophy with ventricular dilatation and periventricular white matter changes.  The degree of ventricular dilatation is similar to what was seen on MRI from 08/11/2022.    CXR: Left internal jugular central venous catheter placement.

## 2022-08-15 NOTE — ASSESSMENT & PLAN NOTE
Consult Neuro-Dr Arthur    No emergent intervention   May perform LP  Seen on CT of head:  IMPRESSION:  1. Moderate ventriculomegaly involving the lateral ventricles and 3rd ventricle demonstrating mild  progression since the oldest comparison 05/25/2021, disproportionate to the degree of peripheral  sulcal prominence and demonstrating increased apical sulcal effacement bilaterally. 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 Consider further assessment with LP and opening pressure, and neurologic or  neurosurgical consult. Nuclear medicine cisternography may be helpful in further assessment.  2. Gradually increasing periventricular white matter hypodensities are nonspecific and might  represent progression of microvascular ischemia in this age group, however in the setting of  hydronephrosis this raises concern for an element of transependymal fluid migration.  3. There are small chronic subependymal nodules in the anterior which are unchanged, favoring  benign lesions such as subependymal hamartomas. Correlate clinically for tuberous sclerosis.  4. Chronic densely calcified dural-based 14 mm lesion in the left middle cranial fossa is unchanged,  consistent with densely calcified meningioma or incidental dural ossification without significant mass  effect.  5. These findings initiated a results reporting process. An addendum will be issued at the time of  clinician notification.

## 2022-08-15 NOTE — PLAN OF CARE
Problem: Infection  Goal: Absence of Infection Signs and Symptoms  8/15/2022 1852 by Dariusz Mccord RN  Outcome: Ongoing, Progressing  8/15/2022 1852 by Dariusz Mccord RN  Outcome: Ongoing, Progressing   Patient continues IV abx as ordered.  Problem: Adult Inpatient Plan of Care  Goal: Plan of Care Review  8/15/2022 1852 by Dariusz Mccord RN  Outcome: Ongoing, Progressing  8/15/2022 1852 by Dariusz Mccord RN  Outcome: Ongoing, Progressing   Patient plan of care reviewed.  Problem: Adult Inpatient Plan of Care  Goal: Patient-Specific Goal (Individualized)  8/15/2022 1852 by Dariusz Mccord RN  Outcome: Ongoing, Progressing  8/15/2022 1852 by Dariusz Mccord RN  Outcome: Ongoing, Progressing     Problem: Adult Inpatient Plan of Care  Goal: Optimal Comfort and Wellbeing  8/15/2022 1852 by Dariusz Mccord RN  Outcome: Ongoing, Progressing  8/15/2022 1852 by Dariusz Mccord RN  Outcome: Ongoing, Progressing   Patient voices no complaints.

## 2022-08-15 NOTE — ASSESSMENT & PLAN NOTE
Patient's FSGs are controlled on current medication regimen.  Last A1c reviewed-   Lab Results   Component Value Date    HGBA1C 6.9 (H) 03/26/2022     Most recent fingerstick glucose reviewed-   Recent Labs   Lab 08/14/22  1007 08/14/22  1542 08/15/22  0723   POCTGLUCOSE 266* 322* 347*     Current correctional scale  Medium  Maintain anti-hyperglycemic dose as follows-   Antihyperglycemics (From admission, onward)            Start     Stop Route Frequency Ordered    08/14/22 2100  insulin detemir U-100 pen 16 Units         -- SubQ Nightly 08/14/22 1531    08/14/22 0111  insulin aspart U-100 pen 1-10 Units         -- SubQ Before meals & nightly PRN 08/14/22 0011        Hold Oral hypoglycemics while patient is in the hospital.

## 2022-08-16 PROBLEM — E87.20 METABOLIC ACIDOSIS: Status: ACTIVE | Noted: 2022-08-16

## 2022-08-16 LAB
ALBUMIN SERPL BCP-MCNC: 1.7 G/DL (ref 3.5–5.2)
ALLENS TEST: ABNORMAL
ALP SERPL-CCNC: 92 U/L (ref 55–135)
ALT SERPL W/O P-5'-P-CCNC: 9 U/L (ref 10–44)
ANION GAP SERPL CALC-SCNC: 8 MMOL/L (ref 8–16)
AST SERPL-CCNC: 10 U/L (ref 10–40)
BACTERIA UR CULT: NO GROWTH
BASOPHILS # BLD AUTO: 0.04 K/UL (ref 0–0.2)
BASOPHILS NFR BLD: 0.5 % (ref 0–1.9)
BILIRUB SERPL-MCNC: 0.2 MG/DL (ref 0.1–1)
BUN SERPL-MCNC: 41 MG/DL (ref 6–20)
CALCIUM SERPL-MCNC: 8.1 MG/DL (ref 8.7–10.5)
CHLORIDE SERPL-SCNC: 112 MMOL/L (ref 95–110)
CO2 SERPL-SCNC: 14 MMOL/L (ref 23–29)
CREAT SERPL-MCNC: 2.5 MG/DL (ref 0.5–1.4)
DELSYS: ABNORMAL
DIFFERENTIAL METHOD: ABNORMAL
EOSINOPHIL # BLD AUTO: 0.2 K/UL (ref 0–0.5)
EOSINOPHIL NFR BLD: 2.7 % (ref 0–8)
ERYTHROCYTE [DISTWIDTH] IN BLOOD BY AUTOMATED COUNT: 14.6 % (ref 11.5–14.5)
EST. GFR  (NO RACE VARIABLE): 22 ML/MIN/1.73 M^2
GLUCOSE SERPL-MCNC: 119 MG/DL (ref 70–110)
HCO3 UR-SCNC: 14.7 MMOL/L (ref 24–28)
HCT VFR BLD AUTO: 25.5 % (ref 37–48.5)
HGB BLD-MCNC: 8.4 G/DL (ref 12–16)
IMM GRANULOCYTES # BLD AUTO: 0.07 K/UL (ref 0–0.04)
IMM GRANULOCYTES NFR BLD AUTO: 1 % (ref 0–0.5)
LYMPHOCYTES # BLD AUTO: 1 K/UL (ref 1–4.8)
LYMPHOCYTES NFR BLD: 14.1 % (ref 18–48)
MAGNESIUM SERPL-MCNC: 1.6 MG/DL (ref 1.6–2.6)
MCH RBC QN AUTO: 30.3 PG (ref 27–31)
MCHC RBC AUTO-ENTMCNC: 32.9 G/DL (ref 32–36)
MCV RBC AUTO: 92 FL (ref 82–98)
MODE: ABNORMAL
MONOCYTES # BLD AUTO: 0.9 K/UL (ref 0.3–1)
MONOCYTES NFR BLD: 12.5 % (ref 4–15)
NEUTROPHILS # BLD AUTO: 5.1 K/UL (ref 1.8–7.7)
NEUTROPHILS NFR BLD: 69.2 % (ref 38–73)
NRBC BLD-RTO: 0 /100 WBC
PCO2 BLDA: 35.9 MMHG (ref 35–45)
PH SMN: 7.22 [PH] (ref 7.35–7.45)
PHOSPHATE SERPL-MCNC: 4.6 MG/DL (ref 2.7–4.5)
PLATELET # BLD AUTO: 257 K/UL (ref 150–450)
PMV BLD AUTO: 10.8 FL (ref 9.2–12.9)
PO2 BLDA: 37 MMHG (ref 40–60)
POC BE: -13 MMOL/L
POC SATURATED O2: 60 % (ref 95–100)
POC TCO2: 16 MMOL/L (ref 24–29)
POCT GLUCOSE: 174 MG/DL (ref 70–110)
POCT GLUCOSE: 213 MG/DL (ref 70–110)
POCT GLUCOSE: 268 MG/DL (ref 70–110)
POCT GLUCOSE: 78 MG/DL (ref 70–110)
POTASSIUM SERPL-SCNC: 4.5 MMOL/L (ref 3.5–5.1)
PROT SERPL-MCNC: 5.4 G/DL (ref 6–8.4)
RBC # BLD AUTO: 2.77 M/UL (ref 4–5.4)
SAMPLE: ABNORMAL
SITE: ABNORMAL
SODIUM SERPL-SCNC: 134 MMOL/L (ref 136–145)
WBC # BLD AUTO: 7.35 K/UL (ref 3.9–12.7)

## 2022-08-16 PROCEDURE — 63600175 PHARM REV CODE 636 W HCPCS: Performed by: NURSE PRACTITIONER

## 2022-08-16 PROCEDURE — 80053 COMPREHEN METABOLIC PANEL: CPT | Performed by: NURSE PRACTITIONER

## 2022-08-16 PROCEDURE — 83735 ASSAY OF MAGNESIUM: CPT | Performed by: NURSE PRACTITIONER

## 2022-08-16 PROCEDURE — 85025 COMPLETE CBC W/AUTO DIFF WBC: CPT | Performed by: NURSE PRACTITIONER

## 2022-08-16 PROCEDURE — 12000002 HC ACUTE/MED SURGE SEMI-PRIVATE ROOM

## 2022-08-16 PROCEDURE — 51798 US URINE CAPACITY MEASURE: CPT

## 2022-08-16 PROCEDURE — 84100 ASSAY OF PHOSPHORUS: CPT | Performed by: NURSE PRACTITIONER

## 2022-08-16 PROCEDURE — 36415 COLL VENOUS BLD VENIPUNCTURE: CPT | Performed by: NURSE PRACTITIONER

## 2022-08-16 PROCEDURE — 97542 WHEELCHAIR MNGMENT TRAINING: CPT

## 2022-08-16 PROCEDURE — 99900035 HC TECH TIME PER 15 MIN (STAT)

## 2022-08-16 PROCEDURE — 25000003 PHARM REV CODE 250: Performed by: NURSE PRACTITIONER

## 2022-08-16 PROCEDURE — 97161 PT EVAL LOW COMPLEX 20 MIN: CPT

## 2022-08-16 PROCEDURE — 82803 BLOOD GASES ANY COMBINATION: CPT

## 2022-08-16 RX ADMIN — PANTOPRAZOLE SODIUM 40 MG: 40 TABLET, DELAYED RELEASE ORAL at 08:08

## 2022-08-16 RX ADMIN — MEROPENEM AND SODIUM CHLORIDE 500 MG: 500 INJECTION, SOLUTION INTRAVENOUS at 05:08

## 2022-08-16 RX ADMIN — NIFEDIPINE 30 MG: 30 TABLET, FILM COATED, EXTENDED RELEASE ORAL at 08:08

## 2022-08-16 RX ADMIN — INSULIN ASPART 2 UNITS: 100 INJECTION, SOLUTION INTRAVENOUS; SUBCUTANEOUS at 05:08

## 2022-08-16 RX ADMIN — RIVAROXABAN 2.5 MG: 2.5 TABLET, FILM COATED ORAL at 09:08

## 2022-08-16 RX ADMIN — CARVEDILOL 3.12 MG: 3.12 TABLET, FILM COATED ORAL at 09:08

## 2022-08-16 RX ADMIN — SERTRALINE HYDROCHLORIDE 50 MG: 50 TABLET ORAL at 08:08

## 2022-08-16 RX ADMIN — INSULIN ASPART 4 UNITS: 100 INJECTION, SOLUTION INTRAVENOUS; SUBCUTANEOUS at 12:08

## 2022-08-16 RX ADMIN — ALLOPURINOL 100 MG: 100 TABLET ORAL at 08:08

## 2022-08-16 RX ADMIN — INSULIN ASPART 3 UNITS: 100 INJECTION, SOLUTION INTRAVENOUS; SUBCUTANEOUS at 09:08

## 2022-08-16 RX ADMIN — INSULIN DETEMIR 16 UNITS: 100 INJECTION, SOLUTION SUBCUTANEOUS at 09:08

## 2022-08-16 RX ADMIN — PREGABALIN 75 MG: 75 CAPSULE ORAL at 05:08

## 2022-08-16 RX ADMIN — Medication 400 MG: at 08:08

## 2022-08-16 RX ADMIN — POLYETHYLENE GLYCOL 3350 17 G: 17 POWDER, FOR SOLUTION ORAL at 08:08

## 2022-08-16 RX ADMIN — ATORVASTATIN CALCIUM 80 MG: 40 TABLET, FILM COATED ORAL at 09:08

## 2022-08-16 RX ADMIN — PREGABALIN 75 MG: 75 CAPSULE ORAL at 08:08

## 2022-08-16 RX ADMIN — CARVEDILOL 3.12 MG: 3.12 TABLET, FILM COATED ORAL at 08:08

## 2022-08-16 RX ADMIN — ASPIRIN 81 MG: 81 TABLET, COATED ORAL at 05:08

## 2022-08-16 RX ADMIN — PREGABALIN 75 MG: 75 CAPSULE ORAL at 09:08

## 2022-08-16 RX ADMIN — Medication 400 MG: at 09:08

## 2022-08-16 RX ADMIN — CLOPIDOGREL BISULFATE 75 MG: 75 TABLET, FILM COATED ORAL at 08:08

## 2022-08-16 NOTE — ASSESSMENT & PLAN NOTE
Patient appears to have acute kidney injury on chronic kidney disease stage 3 with significant underlying metabolic acidosis.  Patient has never been evaluated by nephrologist.  Will consult Nephrology for opinion and recommendations.  Patient may require sodium bicarbonate supplementation.  Follow [facility].

## 2022-08-16 NOTE — PLAN OF CARE
Plan of care reviewed with patient. Oriented to person & place. .L. IJ & R EJ intact.  IV fluids infusing as per orders. SR on telemetry. HS bg 207, covered with ss as per orders. Remains free from falls/injury. Instructed to call for assistance as needed,verbalized understanding. Call light in reach, bed alarm on .

## 2022-08-16 NOTE — PROGRESS NOTES
[facility] Hospital Medicine  Progress Note    Patient Name: Suyapa Connelly  MRN: [id number]  Patient Class: IP- Inpatient   Admission Date: 8/13/2022  Length of Stay: 3 days  Attending Physician: Caty Otero MD  Primary Care Provider: aMgen Christensen MD        Subjective:     Principal Problem:Severe sepsis        HPI:   Suyapa Connelly is a 56 year old female with a past medical history of HTN, DM, CKD, CAD and past surgical history of bilateral AKA due to PAD and CABG, who presented to the ED with confusion and unresponsiveness. She was admitted three days ago for confusion with a CT notable for hydrocephalus, but her  signed her out AMA as he felt she had improved enough to take her home and no longer wanted her to be in the hospital, despite her persistent confusion. She was responsive and communicating more than she was on initial admit, so he said he felt he could care for her better at home. They went to [facility] today, where she started drooling and became confused and unresponsive. She denied any complaints prior. She denies complaint at this time.     Upon arrival to the ED, she was minimally responsive. Staff performed a sternal rub, which woke her up quickly. She was hypotensive and received IV boluses, which improved her blood pressure and mentation. She is currently awake, alert and oriented to self, place and situation. She is not correct with dates. Dr. Arthur with Neurology was consulted by the ED, and did not recommend emergent treatment. He did say there was a possibility of the patient having a lumbar puncture at some point tomorrow. Urinalysis was positive for infection. Her lactic acid level was within normal limits. She has an acute on chronic kidney injury. CBC shows chronic anemia. Hospital Medicine consulted for admission and further management.          Overview/Hospital Course:  No notes on file    Interval History: T-max 99.1° F. Mental status improved but  "appears lethargic as well.  Patient answering questions appropriately.  Denies any focal subjective complaints.  No abdominal pain or urinary difficulties reported.  Patient denies any new focal subjective complaints.    Review of Systems   Constitutional:  Positive for fatigue. Negative for chills and fever.   HENT:  Negative for congestion and sore throat.    Eyes:  Negative for visual disturbance.   Respiratory:  Negative for cough and shortness of breath.    Cardiovascular:  Negative for chest pain and palpitations.   Gastrointestinal:  Negative for abdominal pain, nausea and vomiting.   Endocrine: Negative for cold intolerance and heat intolerance.   Genitourinary:  Negative for dysuria and hematuria.   Musculoskeletal:  Negative for arthralgias and myalgias.   Skin:  Negative for pallor and rash.   Neurological:  Negative for tremors and seizures.   Hematological:  Negative for adenopathy. Does not bruise/bleed easily.   Psychiatric/Behavioral:  Positive for confusion ("i felt like my brain was messed up, like i was there but i wasnt there") and decreased concentration. Negative for hallucinations.    All other systems reviewed and are negative.  Objective:     Vital Signs (Most Recent):  Temp: 98.6 °F (37 °C) (08/16/22 0940)  Pulse: 92 (08/16/22 0940)  Resp: 18 (08/16/22 0940)  BP: 125/73 (08/16/22 0940)  SpO2: 100 % (08/16/22 0940)   Vital Signs (24h Range):  Temp:  [97.5 °F (36.4 °C)-99.1 °F (37.3 °C)] 98.6 °F (37 °C)  Pulse:  [84-94] 92  Resp:  [16-19] 18  SpO2:  [98 %-100 %] 100 %  BP: (102-142)/(55-86) 125/73     Weight: 71.8 kg (158 lb 4.6 oz)  Body mass index is 26.34 kg/m².    Intake/Output Summary (Last 24 hours) at 8/16/2022 1106  Last data filed at 8/15/2022 1919  Gross per 24 hour   Intake 1370.86 ml   Output 300 ml   Net 1070.86 ml        Physical Exam  Vitals and nursing note reviewed.   Constitutional:       General: She is awake. She is not in acute distress.     Appearance: She is " well-developed. She is obese. She is ill-appearing (chronically ill).   HENT:      Head: Normocephalic and atraumatic.      Mouth/Throat:      Lips: Pink.      Mouth: Mucous membranes are moist.   Eyes:      Conjunctiva/sclera: Conjunctivae normal.      Pupils: Pupils are equal, round, and reactive to light.   Neck:      Thyroid: No thyromegaly.      Vascular: No JVD.   Cardiovascular:      Rate and Rhythm: Normal rate and regular rhythm.      Heart sounds: Normal heart sounds, S1 normal and S2 normal. No murmur heard.    No friction rub. No gallop.   Pulmonary:      Effort: Pulmonary effort is normal.      Breath sounds: Normal breath sounds.   Abdominal:      General: Abdomen is protuberant. Bowel sounds are normal. There is no distension.      Palpations: Abdomen is soft. There is no mass.      Tenderness: There is no abdominal tenderness.   Musculoskeletal:         General: Normal range of motion.      Cervical back: Full passive range of motion without pain, normal range of motion and neck supple.      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      Left Lower Extremity: Left leg is amputated above knee.   Skin:     General: Skin is warm and dry.      Capillary Refill: Capillary refill takes 2 to 3 seconds.   Neurological:      General: No focal deficit present.      Mental Status: She is alert and oriented to person, place, and time.      Cranial Nerves: No cranial nerve deficit.      Sensory: Sensation is intact.      Motor: Motor function is intact.      Comments: Oriented to person, place and situation, unable to tell birthdate  Converses well, no dysarthria and no aphasia  Moves all extremities well with equal strength   Psychiatric:         Behavior: Behavior normal. Behavior is cooperative.       Significant Labs: All pertinent labs within the past 24 hours have been reviewed.  CBC:   Recent Labs   Lab 08/15/22  0402 08/16/22  0413   WBC 8.80  7.35   HGB 8.8* 8.4*   HCT 26.2* 25.5*    257       CMP:   Recent Labs   Lab 08/15/22  0402 08/16/22  0413   * 134*   K 4.1 4.5    112*   CO2 14* 14*   * 119*   BUN 41* 41*   CREATININE 2.7* 2.5*   CALCIUM 7.6* 8.1*   PROT 5.5* 5.4*   ALBUMIN 1.9* 1.7*   BILITOT 0.3 0.2   ALKPHOS 99 92   AST 10 10   ALT 8* 9*   ANIONGAP 9 8       TSH:   Recent Labs   Lab 08/13/22  2204   TSH 0.885       Urine Culture:   No results for input(s): LABURIN in the last 48 hours.    Urine Studies:   No results for input(s): COLORU, APPEARANCEUA, PHUR, SPECGRAV, PROTEINUA, GLUCUA, KETONESU, BILIRUBINUA, OCCULTUA, NITRITE, UROBILINOGEN, LEUKOCYTESUR, RBCUA, WBCUA, BACTERIA, SQUAMEPITHEL, HYALINECASTS in the last 48 hours.    Invalid input(s): [facility]    Microbiology Results (last 7 days)       Procedure Component Value Units Date/Time    Urine Culture High Risk [[id number]] Collected: 08/15/22 1650    Order Status: Sent Specimen: Urine Updated: 08/15/22 2339    Urine culture [[id number]] Collected: 08/13/22 1855    Order Status: Completed Specimen: Urine Updated: 08/15/22 0115     Urine Culture, Routine No growth    Narrative:      Specimen Source->Urine          Significant Imaging:   CT Head (08-10-22):  1. Suspected vasogenic edema in the bilateral parietooccipital lobes which can be seen with PRES.  Recommend clinical correlation and consider further evaluation with MRI.  2. Senescent changes and chronic microvascular ischemic changes.    MRI Brain:  The study is mildly motion degraded.  There is however no acute abnormality and little if any change compared to a prior brain MRI dated 06/02/2021.  The ventricles are mildly prominent but without definite change.  There is no midline shift.  There is at least moderate white matter change.  These findings however were present on the comparison MRI and without any significant or definite change.  Findings do not strongly suggest PRES.  There is no hemorrhage, mass  or acute infarction.    MRA Brain:  Mild irregularity of the left greater than right cavernous internal carotid arteries without significant atherosclerotic narrowing.  No other high-grade stenosis, large vessel occlusion or aneurysm.    MRA Neck:   Mild narrowing at bilateral carotid bifurcations with less than 50% stenosis.  Otherwise, unremarkable MRA of the neck vasculature.    CXR: There are linear regions of atelectasis and bilateral lower lung zones.    CT Head:  There is no evidence acute intracranial process.  There is cerebral atrophy with ventricular dilatation and periventricular white matter changes.  The degree of ventricular dilatation is similar to what was seen on MRI from 08/11/2022.    CXR: Left internal jugular central venous catheter placement.        Assessment/Plan:      * Severe sepsis  This patient does have evidence of infective focus  My overall impression is sepsis. Vital signs were reviewed and noted in progress note.  Antibiotics given-   Antibiotics (From admission, onward)            Start     Stop Route Frequency Ordered    08/14/22 0500  meropenem-0.9% sodium chloride 500 mg/50 mL IVPB         -- IV Every 12 hours (non-standard times) 08/14/22 0002        Cultures were taken-   Microbiology Results (last 7 days)     Procedure Component Value Units Date/Time    Urine culture [[id number]] Collected: 08/13/22 1855    Order Status: Completed Specimen: Urine Updated: 08/15/22 0115     Urine Culture, Routine No growth    Narrative:      Specimen Source->Urine        Latest lactate reviewed, they are-  Recent Labs   Lab 08/13/22  1747 08/13/22  2204   LACTATE 1.2 1.4       Organ dysfunction indicated by Acute kidney injury and Encephalopathy   Source- urine    Source control Achieved by- antibiotics      Metabolic acidosis  Patient appears to have acute kidney injury on chronic kidney disease stage 3 with significant underlying metabolic acidosis.  Patient has never been evaluated by  nephrologist.  Will consult Nephrology for opinion and recommendations.  Patient may require sodium bicarbonate supplementation.  Follow VBG.      Communicating hydrocephalus  Consult Neuro-Dr Arthur    No emergent intervention   May perform LP  Seen on CT of head:  IMPRESSION:  1. Moderate ventriculomegaly involving the lateral ventricles and 3rd ventricle demonstrating mild  progression since the oldest comparison 05/25/2021, disproportionate to the degree of peripheral  sulcal prominence and demonstrating increased apical sulcal effacement bilaterally. 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 Consider further assessment with LP and opening pressure, and neurologic or  neurosurgical consult. Nuclear medicine cisternography may be helpful in further assessment.  2. Gradually increasing periventricular white matter hypodensities are nonspecific and might  represent progression of microvascular ischemia in this age group, however in the setting of  hydronephrosis this raises concern for an element of transependymal fluid migration.  3. There are small chronic subependymal nodules in the anterior which are unchanged, favoring  benign lesions such as subependymal hamartomas. Correlate clinically for tuberous sclerosis.  4. Chronic densely calcified dural-based 14 mm lesion in the left middle cranial fossa is unchanged,  consistent with densely calcified meningioma or incidental dural ossification without significant mass  effect.  5. These findings initiated a results reporting process. An addendum will be issued at the time of  clinician notification.    Confusion  Transient, had episode of altered mental status that resolved with IV fluid hydration    Status post above-knee amputation of both lower extremities  Noted  Fall precautions      UTI (urinary tract infection)  Follow urine culture  Meropenem from zosyn due to kidney injury  and hx of ESBL and MDRO, historical culture showed sensitivity      Acute renal failure superimposed on stage 3 chronic kidney disease    Patient with acute kidney injury likely d/t Pre-renal azotemia. ROSLYN is currently stable. Labs reviewed- Renal function/electrolytes with Estimated Creatinine Clearance: 23.4 mL/min (A) (based on SCr of 2.7 mg/dL (H)). according to latest data. Monitor urine output and serial BMP and adjust therapy as needed. Avoid nephrotoxins and renally dose meds for GFR listed above.       Class 2 severe obesity due to excess calories with serious comorbidity in adult  Body mass index is 26.34 kg/m². Morbid obesity complicates all aspects of disease management from diagnostic modalities to treatment. Weight loss encouraged and health benefits explained to patient.         Impaired functional mobility and endurance  PT/OT consulted  Turn q2h  Assist with ADLs      Hyponatremia  Na on admit 132  Gentle IVF rehydration    Paroxysmal atrial fibrillation  Patient with Paroxysmal (<7 days) atrial fibrillation which is controlled currently with Beta Blocker and Calcium Channel Blocker. Patient is currently in sinus rhythm.[id number] Score: 3. HASBLED Score: Unable to calculate. Anticoagulation indicated. Anticoagulation done with xarelto.        Anemia  Patient's anemia is currently controlled. Has not received any PRBCs to date.. Etiology likely d/t chronic disease  Current CBC reviewed-   Lab Results   Component Value Date    HGB 8.8 (L) 08/15/2022    HCT 26.2 (L) 08/15/2022     Monitor serial CBC and transfuse if patient becomes hemodynamically unstable, symptomatic or H/H drops below 7/21.         CAD (coronary artery disease)    Patient with known CAD s/p CABG, which is controlled Will continue ASA, Plavix and Statin and monitor for S/Sx of angina/ACS. Continue to monitor on telemetry.       Type 2 diabetes mellitus with hyperglycemia, with long-term current use of insulin  Patient's FSGs are  controlled on current medication regimen.  Last A1c reviewed-   Lab Results   Component Value Date    HGBA1C 6.9 (H) 03/26/2022     Most recent fingerstick glucose reviewed-   Recent Labs   Lab 08/14/22  1007 08/14/22  1542 08/15/22  0723   POCTGLUCOSE 266* 322* 347*     Current correctional scale  Medium  Maintain anti-hyperglycemic dose as follows-   Antihyperglycemics (From admission, onward)            Start     Stop Route Frequency Ordered    08/14/22 2100  insulin detemir U-100 pen 16 Units         -- SubQ Nightly 08/14/22 1531    08/14/22 0111  insulin aspart U-100 pen 1-10 Units         -- SubQ Before meals & nightly PRN 08/14/22 0011        Hold Oral hypoglycemics while patient is in the hospital.    Mixed hyperlipidemia   Patient is chronically on statin.will continue for now. Monitor clinically. Last LDL was   Lab Results   Component Value Date    LDLCALC 177.6 (H) 12/03/2021            CAROLIN (generalized anxiety disorder)  Noted, chronic      Essential hypertension  Chronic, controlled.  Latest blood pressure and vitals reviewed-   Temp:  [97.8 °F (36.6 °C)-99.9 °F (37.7 °C)]   Pulse:  []   Resp:  [16-20]   BP: (100-130)/(56-80)   SpO2:  [97 %-100 %] .   Home meds for hypertension were reviewed and noted below.   Hypertension Medications             carvediloL (COREG) 3.125 MG tablet TAKE 1 TABLET(3.125 MG) BY MOUTH TWICE DAILY WITH BREAKFAST AND DINNER    furosemide (LASIX) 40 MG tablet Take 1 tablet (40 mg total) by mouth once daily.    NIFEdipine (PROCARDIA-XL) 30 MG (OSM) 24 hr tablet Take 1 tablet (30 mg total) by mouth once daily.          While in the hospital, will manage blood pressure as follows; Continue home antihypertensive regimen, hold losartan    Will utilize p.r.n. blood pressure medication only if patient's blood pressure greater than  180/110 and she develops symptoms such as worsening chest pain or shortness of breath.        Moderate malnutrition  Nutrition consulted. Most recent  weight and BMI monitored-                         Measurements:  Wt Readings from Last 1 Encounters:   08/13/22 71.8 kg (158 lb 4.6 oz)   Body mass index is 26.34 kg/m².    Recommendations:      Patient has been screened and assessed by RD. RD will follow patient.        VTE Risk Mitigation (From admission, onward)         Ordered     IP VTE HIGH RISK PATIENT  Once         08/13/22 2128                Discharge Planning   DEVAN: 8/16/2022     Code Status: Full Code   Is the patient medically ready for discharge?:     Reason for patient still in hospital (select all that apply): Patient trending condition and Consult recommendations  Discharge Plan A: Home with family                  Caty Otero MD  Department of Hospital Medicine   [facility]

## 2022-08-16 NOTE — PLAN OF CARE
Problem: Physical Therapy  Goal: Physical Therapy Goal  Description: Goals to be met by: 2022     Patient will increase functional independence with mobility by performin. Supine to sit with Modified [place]  2. Bed to chair transfer with Contact Guard Assistance using No Assistive Device anterior and posterior scooting  3. Wheelchair propulsion x250 feet with Modified Buckner using bilateral uppper extremities    Outcome: Ongoing, Progressing   PT eval and treat. Pt alert and following directions. Pt able to transfer to own wheelchair via anterior and posterior scooting. Pt propelled wheelchair at hallways min assist.

## 2022-08-16 NOTE — PT/OT/SLP EVAL
Physical Therapy Evaluation    Patient Name:  Suyapa Connelly   MRN:  [id number]    Recommendations:     Discharge Recommendations:  home health PT   Discharge Equipment Recommendations: none   Barriers to discharge: None    Assessment:     Suyapa Connelly is a 56 y.o. female admitted with a medical diagnosis of Severe sepsis.  She presents with the following impairments/functional limitations:  weakness, impaired endurance, impaired functional mobility, impaired cognition, decreased lower extremity function, decreased safety awareness, impaired cardiopulmonary response to activity .    Pt seen supine in bed and agreeable to PT. Pt with hx of bilateral AKA  And functional at wheelchair level. Pt requiring min assist to complete bed to wheelchair via anterior and posterior scooting. Pt taken to hallways and propelled w/c 250ft with min assist utilizing bilateral UE's.  Pt to benefit from HHPT.    Rehab Prognosis: Fair; patient would benefit from acute skilled PT services to address these deficits and reach maximum level of function.    Recent Surgery: * No surgery found *      Plan:     During this hospitalization, patient to be seen 6 x/week to address the identified rehab impairments via therapeutic activities, therapeutic exercises, wheelchair management/training and progress toward the following goals:    · Plan of Care Expires:  08/30/22    Subjective   Pt able to name hospital/city and date  Stated spouse went home to care for his mother    Chief Complaint: none  Patient/Family Comments/goals: get back home  Pain/Comfort:  · Pain Rating 1: 0/10    Patients cultural, spiritual, Taoist conflicts given the current situation:      Living Environment:  Home with spouse/her mother and his mother    Prior to admission, patients level of function was functioning at wheelchair level.  Equipment used at home: wheelchair.  DME owned (not currently used): none.  Upon discharge, patient will have assistance from  spouse.    Objective:     Communicated with nurse Dasilva prior to session.  Patient found HOB elevated with telemetry, peripheral IV  upon PT entry to room.    General Precautions: Standard, fall   Orthopedic Precautions:N/A (hx of bilateral AKA)   Braces: N/A  Respiratory Status: Room air    Exams:  · RLE ROM: WFL except AKA  · RLE Strength: WFL except AKA  · LLE ROM: WFL except AKA  · LLE Strength: WFL except AKA    Functional Mobility:  · Bed Mobility:     · Scooting: minimum assistance  · Supine to Sit: minimum assistance  · Sit to Supine: minimum assistance  · Transfers:     · Bed to Chair: minimum assistance with  no AD  using  via anterior and posterior scooting  · Wheelchair Propulsion:  Pt propelled Standard wheelchair x 250 feet on Level tile with  Bilateral upper extremity with Minimal Assistance.     Therapeutic Activities and Exercises:   Patient was educated on the importance of OOB activity and functional mobility to negate negative effects of prolonged bed rest during hospitalization, safe transfers and ambulation, and D/C planning   Pt worked on bed to wheelchair transfers via anterior and posterior scooting technique    AM-PAC 6 CLICK MOBILITY  Total Score:12     Patient left HOB elevated with all lines intact, call button in reach, bed alarm on and nurse Dariusz notified.    GOALS:   Multidisciplinary Problems     Physical Therapy Goals        Problem: Physical Therapy    Goal Priority Disciplines Outcome Goal Variances Interventions   Physical Therapy Goal     PT, PT/OT Ongoing, Progressing     Description: Goals to be met by: 2022     Patient will increase functional independence with mobility by performin. Supine to sit with Modified [place]  2. Bed to chair transfer with Contact Guard Assistance using No Assistive Device anterior and posterior scooting  3. Wheelchair propulsion x250 feet with Modified [place] using bilateral uppper extremities                     History:      Past Medical History:   Diagnosis Date    Anxiety     Chronic pain syndrome     CKD (chronic kidney disease), stage III     Depression     Diabetes mellitus     type 2    Diabetes mellitus, type 2     GERD (gastroesophageal reflux disease)     Hyperemesis 3/23/2021    Hyperlipemia     Hypertension     Hypokalemia 3/23/2021    Myocardial infarction 2010    minor-caused by stress per pt.    Osteomyelitis     Osteomyelitis of left foot 4/30/2021    PVD (peripheral vascular disease)     Vaginal delivery     x1       Past Surgical History:   Procedure Laterality Date    ABOVE-KNEE AMPUTATION Left 5/18/2021    Procedure: AMPUTATION, ABOVE KNEE;  Surgeon: Teddy Huber MD;  Location: [facility] OR [address] [place] [address];  Service: Vascular;  Laterality: Left;    ABOVE-KNEE AMPUTATION Right 3/18/2022    Procedure: AMPUTATION, ABOVE KNEE;  Surgeon: DAYNE Florez II, MD;  Location: [facility] OR [address] [place] [address];  Service: Vascular;  Laterality: Right;    Angiogram - Right Extremity Right 7/9/15    angiogram-left leg  10/6/15    ANGIOGRAPHY OF LOWER EXTREMITY Left 4/29/2021    Procedure: ANGIOGRAM, LOWER EXTREMITY;  Surgeon: Teddy Huber MD;  Location: [address];  Service: Vascular;  Laterality: Left;    BELOW KNEE AMPUTATION OF LOWER EXTREMITY Right 12/28/2021    Procedure: AMPUTATION, BELOW KNEE;  Surgeon: Kaitlyn Rojas MD;  Location: [facility];  Service: General;  Laterality: Right;    CATHETERIZATION OF BOTH LEFT AND RIGHT HEART N/A 12/18/2019    Procedure: CATHETERIZATION, HEART, BOTH LEFT AND RIGHT;  Surgeon: Que Fernando III, MD;  Location: [facility] CATH LAB;  Service: Cardiology;  Laterality: N/A;    CORONARY ANGIOGRAPHY N/A 12/18/2019    Procedure: ANGIOGRAM, CORONARY ARTERY;  Surgeon: Que Fernando III, MD;  Location: [facility] CATH LAB;  Service: Cardiology;  Laterality: N/A;    CORONARY ANGIOGRAPHY INCLUDING BYPASS GRAFTS WITH CATHETERIZATION OF LEFT HEART N/A 7/28/2020    Procedure:  ANGIOGRAM, CORONARY, INCLUDING BYPASS GRAFT, WITH LEFT HEART CATHETERIZATION, 9 am;  Surgeon: Rachel Easley MD;  Location: [facility] CATH LAB;  Service: Cardiology;  Laterality: N/A;    CORONARY ARTERY BYPASS GRAFT (CABG) N/A 1/14/2020    Procedure: CORONARY ARTERY BYPASS GRAFT (CABG) x 1     Off Pump;  Surgeon: Huang Altamirano MD;  Location: [facility] OR [address] [place] [address];  Service: Cardiovascular;  Laterality: N/A;    CREATION OF FEMORAL-TIBIAL ARTERY BYPASS Left 4/29/2021    Procedure: CREATION, BYPASS, ARTERIAL, FEMORAL TO ANTERIOR TIBIAL;  Surgeon: Teddy Huber MD;  [facility];  Service: Vascular;  Laterality: Left;    CREATION OF FEMOROPOPLITEAL ARTERIAL BYPASS USING GRAFT Left 8/18/2020    Procedure: CREATION, BYPASS, ARTERIAL, FEMORAL TO POPLITEAL, USING GRAFT, LEFT LOWER EXTREMITY;  Surgeon: Teddy Huber MD;  Location: [facility];  Service: Vascular;  Laterality: Left;  REQUEST 7:15 A.M. START----COVID NEGATIVE ON 8/17 1ST CASE STARTE PER LEANA ON 8/7/2020 @ 942AM-LO  RN PREOP 8/12/2020   T/S-----CLEARED BY CARDS-------PENDING INSURANCE    DEBRIDEMENT OF FOOT Left 9/8/2020    Procedure: DEBRIDEMENT, FOOT;  Surgeon: Rosio Mayes DPM;  Location: [facility];  Service: Podiatry;  Laterality: Left;  request neoxx .   RN Pre Op 9-4-2020, Covid negative on 9/5/20. C A    DEBRIDEMENT OF FOOT  3/4/2021    Procedure: DEBRIDEMENT, FOOT;  Surgeon: Teddy Huber MD;  Location: [facility] OR;  Service: Vascular;;    DEBRIDEMENT OF FOOT Left 3/9/2021    Procedure: DEBRIDEMENT, FOOT, bone biopsy;  Surgeon: Rosio Mayes DPM;  Location: [facility] OR;  Service: Podiatry;  Laterality: Left;  Request neoxx---COVID IN AM  REQUESTING NOON START  RN Phone Pre op.On Blood thinners Plavix and Eliquis.  Covid am of surgery. C A    DEBRIDEMENT OF FOOT Left 5/4/2021    Procedure: DEBRIDEMENT, FOOT;  Surgeon: Farooq Morley DPM;  Location: [facility] OR [address] [place] [address];  Service: Podiatry;  Laterality: Left;    INSERTION  OF TUNNELED CENTRAL VENOUS HEMODIALYSIS CATHETER N/A 1/27/2020    Procedure: Insertion, Catheter, Central Venous, Hemodialysis;  Surgeon: ESTEBAN Gomez III, MD;  Location: [facility] CATH LAB;  Service: Peripheral Vascular;  Laterality: N/A;    PERCUTANEOUS TRANSLUMINAL ANGIOPLASTY N/A 3/4/2021    Procedure: PTA (ANGIOPLASTY, PERCUTANEOUS, TRANSLUMINAL);  Surgeon: Teddy Huber MD;  Location: [facility] OR;  Service: Vascular;  Laterality: N/A;    REMOVAL OF ARTERIOVENOUS GRAFT Left 5/27/2021    Procedure: REMOVAL, GRAFT, LEFT LOWER EXTREMITY, WOUND EXPLORATION;  Surgeon: Teddy Huber MD;  Location: [facility] OR 2ND FLR;  Service: Vascular;  Laterality: Left;    REMOVAL OF NAIL OF DIGIT Left 3/9/2021    Procedure: REMOVAL, NAIL, DIGIT;  Surgeon: Rosio Mayes DPM;  Location: [facility] OR;  Service: Podiatry;  Laterality: Left;    THROMBECTOMY Left 3/4/2021    Procedure: THROMBECTOMY, LEFT LOWER EXTREMITY BYPASS GRAFT, ANGIOGRAM, POSSIBLE INTERVENTION, POSSIBLE LEFT LOWER EXTREMITY BYPASS;  Surgeon: Teddy Huber MD;  Location: [facility] OR;  Service: Vascular;  Laterality: Left;    THROMBECTOMY Left 4/29/2021    Procedure: GRAFT THROMBECTOMY, LEFT LOWER EXTREMITY;  Surgeon: Teddy Huber MD;  Location: [facility] OR 2ND FLR;  Service: Vascular;  Laterality: Left;  14.5 min  1179.85 mGy  341.01 Gycm2  240 ml dye    THROMBECTOMY  10/22/2021    Procedure: THROMBECTOMY;  Surgeon: Saad Arenas MD;  Location: [facility] CATH LAB/EP;  Service: Cardiology;;       Time Tracking:     PT Received On: 08/16/22  PT Start Time: 1102     PT Stop Time: 1123  PT Total Time (min): 21 min     Billable Minutes: Evaluation 10 and Train/Wheelchair Management 11      08/16/2022

## 2022-08-16 NOTE — PLAN OF CARE
Problem: Infection  Goal: Absence of Infection Signs and Symptoms  Outcome: Ongoing, Progressing   Patient continues IV abx as ordered and is currently tolerating well.  Problem: Adult Inpatient Plan of Care  Goal: Plan of Care Review  Outcome: Ongoing, Progressing  Goal: Patient-Specific Goal (Individualized)  Outcome: Ongoing, Progressing  Plan of care reviewed with patient. Patient compliant with care and follows hospital safety measures, but doesn't follow diabetic diet.  Goal: Absence of Hospital-Acquired Illness or Injury  Outcome: Ongoing, Progressing  Goal: Optimal Comfort and Wellbeing  Outcome: Ongoing, Progressing   Patient denies any pain or discomfort at this time.  Problem: Nutrition Impaired (Sepsis/Septic Shock)  Goal: Optimal Nutrition Intake  Outcome: Ongoing, Progressing   Patient has adequate nutrition eats 75%-100% of meals and is able to feed self.  Problem: Fluid and Electrolyte Imbalance (Acute Kidney Injury/Impairment)  Goal: Fluid and Electrolyte Balance  Outcome: Ongoing, Progressing   Patient continues IV abx as ordered.

## 2022-08-16 NOTE — SUBJECTIVE & OBJECTIVE
"Interval History: T-max 99.1° F. Mental status improved to baseline.  Patient answering questions appropriately.  Denies any focal subjective complaints.  No abdominal pain or urinary difficulties reported.  Patient denies any new focal subjective complaints.    Review of Systems   Constitutional:  Positive for fatigue. Negative for chills and fever.   HENT:  Negative for congestion and sore throat.    Eyes:  Negative for visual disturbance.   Respiratory:  Negative for cough and shortness of breath.    Cardiovascular:  Negative for chest pain and palpitations.   Gastrointestinal:  Negative for abdominal pain, nausea and vomiting.   Endocrine: Negative for cold intolerance and heat intolerance.   Genitourinary:  Negative for dysuria and hematuria.   Musculoskeletal:  Negative for arthralgias and myalgias.   Skin:  Negative for pallor and rash.   Neurological:  Negative for tremors and seizures.   Hematological:  Negative for adenopathy. Does not bruise/bleed easily.   Psychiatric/Behavioral:  Positive for confusion ("i felt like my brain was messed up, like i was there but i wasnt there") and decreased concentration. Negative for hallucinations.    All other systems reviewed and are negative.  Objective:     Vital Signs (Most Recent):  Temp: 98.6 °F (37 °C) (08/16/22 0940)  Pulse: 92 (08/16/22 0940)  Resp: 18 (08/16/22 0940)  BP: 125/73 (08/16/22 0940)  SpO2: 100 % (08/16/22 0940)   Vital Signs (24h Range):  Temp:  [97.5 °F (36.4 °C)-99.1 °F (37.3 °C)] 98.6 °F (37 °C)  Pulse:  [84-94] 92  Resp:  [16-19] 18  SpO2:  [98 %-100 %] 100 %  BP: (102-142)/(55-86) 125/73     Weight: 71.8 kg (158 lb 4.6 oz)  Body mass index is 26.34 kg/m².    Intake/Output Summary (Last 24 hours) at 8/16/2022 1106  Last data filed at 8/15/2022 1919  Gross per 24 hour   Intake 1370.86 ml   Output 300 ml   Net 1070.86 ml        Physical Exam  Vitals and nursing note reviewed.   Constitutional:       General: She is awake. She is not in acute " distress.     Appearance: She is well-developed. She is obese. She is ill-appearing (chronically ill).   HENT:      Head: Normocephalic and atraumatic.      Mouth/Throat:      Lips: Pink.      Mouth: Mucous membranes are moist.   Eyes:      Conjunctiva/sclera: Conjunctivae normal.      Pupils: Pupils are equal, round, and reactive to light.   Neck:      Thyroid: No thyromegaly.      Vascular: No JVD.   Cardiovascular:      Rate and Rhythm: Normal rate and regular rhythm.      Heart sounds: Normal heart sounds, S1 normal and S2 normal. No murmur heard.    No friction rub. No gallop.   Pulmonary:      Effort: Pulmonary effort is normal.      Breath sounds: Normal breath sounds.   Abdominal:      General: Abdomen is protuberant. Bowel sounds are normal. There is no distension.      Palpations: Abdomen is soft. There is no mass.      Tenderness: There is no abdominal tenderness.   Musculoskeletal:         General: Normal range of motion.      Cervical back: Full passive range of motion without pain, normal range of motion and neck supple.      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      Left Lower Extremity: Left leg is amputated above knee.   Skin:     General: Skin is warm and dry.      Capillary Refill: Capillary refill takes 2 to 3 seconds.   Neurological:      General: No focal deficit present.      Mental Status: She is alert and oriented to person, place, and time.      Cranial Nerves: No cranial nerve deficit.      Sensory: Sensation is intact.      Motor: Motor function is intact.      Comments: Oriented to person, place and situation, unable to tell birthdate  Converses well, no dysarthria and no aphasia  Moves all extremities well with equal strength   Psychiatric:         Behavior: Behavior normal. Behavior is cooperative.       Significant Labs: All pertinent labs within the past 24 hours have been reviewed.  CBC:   Recent Labs   Lab  08/15/22  0402 08/16/22  0413   WBC 8.80 7.35   HGB 8.8* 8.4*   HCT 26.2* 25.5*    257       CMP:   Recent Labs   Lab 08/15/22  0402 08/16/22  0413   * 134*   K 4.1 4.5    112*   CO2 14* 14*   * 119*   BUN 41* 41*   CREATININE 2.7* 2.5*   CALCIUM 7.6* 8.1*   PROT 5.5* 5.4*   ALBUMIN 1.9* 1.7*   BILITOT 0.3 0.2   ALKPHOS 99 92   AST 10 10   ALT 8* 9*   ANIONGAP 9 8       TSH:   Recent Labs   Lab 08/13/22  2204   TSH 0.885       Urine Culture:   No results for input(s): LABURIN in the last 48 hours.    Urine Studies:   No results for input(s): COLORU, APPEARANCEUA, PHUR, SPECGRAV, PROTEINUA, GLUCUA, KETONESU, BILIRUBINUA, OCCULTUA, NITRITE, UROBILINOGEN, LEUKOCYTESUR, RBCUA, WBCUA, BACTERIA, SQUAMEPITHEL, HYALINECASTS in the last 48 hours.    Invalid input(s): WRIGHTSUR    Microbiology Results (last 7 days)       Procedure Component Value Units Date/Time    Urine Culture High Risk [[id number]] Collected: 08/15/22 1650    Order Status: Sent Specimen: Urine Updated: 08/15/22 2339    Urine culture [[id number]] Collected: 08/13/22 1855    Order Status: Completed Specimen: Urine Updated: 08/15/22 0115     Urine Culture, Routine No growth    Narrative:      Specimen Source->Urine          Significant Imaging:   CT Head (08-10-22):  1. Suspected vasogenic edema in the bilateral parietooccipital lobes which can be seen with PRES.  Recommend clinical correlation and consider further evaluation with MRI.  2. Senescent changes and chronic microvascular ischemic changes.    MRI Brain:  The study is mildly motion degraded.  There is however no acute abnormality and little if any change compared to a prior brain MRI dated 06/02/2021.  The ventricles are mildly prominent but without definite change.  There is no midline shift.  There is at least moderate white matter change.  These findings however were present on the comparison MRI and without any significant or definite change.  Findings do not strongly  suggest PRES.  There is no hemorrhage, mass or acute infarction.    MRA Brain:  Mild irregularity of the left greater than right cavernous internal carotid arteries without significant atherosclerotic narrowing.  No other high-grade stenosis, large vessel occlusion or aneurysm.    MRA Neck:   Mild narrowing at bilateral carotid bifurcations with less than 50% stenosis.  Otherwise, unremarkable MRA of the neck vasculature.    CXR: There are linear regions of atelectasis and bilateral lower lung zones.    CT Head:  There is no evidence acute intracranial process.  There is cerebral atrophy with ventricular dilatation and periventricular white matter changes.  The degree of ventricular dilatation is similar to what was seen on MRI from 08/11/2022.    CXR: Left internal jugular central venous catheter placement.

## 2022-08-16 NOTE — CONSULTS
Nephrology Consult Note        Patient Name: Suyapa Connelly  MRN: [id number]    Patient Class: IP- Inpatient   Admission Date: 8/13/2022  Length of Stay: 3 days  Date of Service: 8/16/2022    Attending Physician: Caty Otero MD  Primary Care Provider: Magen Christensen MD    Reason for Consult: roslyn/acidosis/hyponatremia/hyperglycemia/dm2/ckd3/anemia/htn    SUBJECTIVE:     HPI: 56F with HTN, DM, CKD stage 3, bilateral AKA due to PAD, CABG, presented with confusion and unresponsiveness on 8/13. She was admitted on 8/10 for confusion with a CT notable for hydrocephalus, but her  signed her out AMA as he felt she had improved enough to take her home and no longer wanted her to be in the hospital, despite her persistent confusion. She was responsive and communicating more than she was on initial admit, so he felt he could care for her better at home.     They went to [facility] on 8/13, where she started drooling and became confused and unresponsive.      Upon arrival to the ED, she was minimally responsive. Staff performed a sternal rub, which woke her up quickly. She was hypotensive and received IV boluses, which improved her blood pressure and mentation.    Labs were pertinent for hyponatremia, acidosis, ROSLYN, hypoalbuminemia. She has chronic, probably diabetic, proteinuria and occasional hematuria and yeast in urine. On imaging in 5/2021 had bilateral retroperitoneal hematomas, but no renal obstruction. Renal is consulted for co-management of acidosis and roslyn.     Since 2019 she had repeated episodes of SHERRELL, which seem to be recovering to baseline renal function around 1 - most recent event in 3/2022. Brief episodes of hyponatremia and uncontrolled acidosis seem to coincide with the ROSLYN episodes as well as uncontrolled BGs. Lactate is currently normal.    Past Medical History:   Diagnosis Date    Anxiety     Chronic pain syndrome     CKD (chronic kidney disease), stage III     Depression     Diabetes  mellitus     type 2    Diabetes mellitus, type 2     GERD (gastroesophageal reflux disease)     Hyperemesis 3/23/2021    Hyperlipemia     Hypertension     Hypokalemia 3/23/2021    Myocardial infarction 2010    minor-caused by stress per pt.    Osteomyelitis     Osteomyelitis of left foot 4/30/2021    PVD (peripheral vascular disease)     Vaginal delivery     x1     Past Surgical History:   Procedure Laterality Date    ABOVE-KNEE AMPUTATION Left 5/18/2021    Procedure: AMPUTATION, ABOVE KNEE;  Surgeon: Teddy Huber MD;  [facility];  Service: Vascular;  Laterality: Left;    ABOVE-KNEE AMPUTATION Right 3/18/2022    Procedure: AMPUTATION, ABOVE KNEE;  Surgeon: DAYNE Florez II, MD;  [facility];  Service: Vascular;  Laterality: Right;    Angiogram - Right Extremity Right 7/9/15    angiogram-left leg  10/6/15    ANGIOGRAPHY OF LOWER EXTREMITY Left 4/29/2021    Procedure: ANGIOGRAM, LOWER EXTREMITY;  Surgeon: Teddy Huber MD;  Location: [facility] OR [address] [place] [address];  Service: Vascular;  Laterality: Left;    BELOW KNEE AMPUTATION OF LOWER EXTREMITY Right 12/28/2021    Procedure: AMPUTATION, BELOW KNEE;  Surgeon: Kaitlyn Rojas MD;  Location: [facility] OR;  Service: General;  Laterality: Right;    CATHETERIZATION OF BOTH LEFT AND RIGHT HEART N/A 12/18/2019    Procedure: CATHETERIZATION, HEART, BOTH LEFT AND RIGHT;  Surgeon: Que Fernando III, MD;  Location: [facility] CATH LAB;  Service: Cardiology;  Laterality: N/A;    CORONARY ANGIOGRAPHY N/A 12/18/2019    Procedure: ANGIOGRAM, CORONARY ARTERY;  Surgeon: Que Fernando III, MD;  Location: [facility] CATH LAB;  Service: Cardiology;  Laterality: N/A;    CORONARY ANGIOGRAPHY INCLUDING BYPASS GRAFTS WITH CATHETERIZATION OF LEFT HEART N/A 7/28/2020    Procedure: ANGIOGRAM, CORONARY, INCLUDING BYPASS GRAFT, WITH LEFT HEART CATHETERIZATION, 9 am;  Surgeon: Rachel Easley MD;  Location: [facility] CATH LAB;  Service: Cardiology;   Laterality: N/A;    CORONARY ARTERY BYPASS GRAFT (CABG) N/A 1/14/2020    Procedure: CORONARY ARTERY BYPASS GRAFT (CABG) x 1     Off Pump;  Surgeon: Huang Altamirano MD;  [facility];  Service: Cardiovascular;  Laterality: N/A;    CREATION OF FEMORAL-TIBIAL ARTERY BYPASS Left 4/29/2021    Procedure: CREATION, BYPASS, ARTERIAL, FEMORAL TO ANTERIOR TIBIAL;  Surgeon: Teddy Huber MD;  [facility];  Service: Vascular;  Laterality: Left;    CREATION OF FEMOROPOPLITEAL ARTERIAL BYPASS USING GRAFT Left 8/18/2020    Procedure: CREATION, BYPASS, ARTERIAL, FEMORAL TO POPLITEAL, USING GRAFT, LEFT LOWER EXTREMITY;  Surgeon: Teddy Huber MD;  Location: [facility] OR;  Service: Vascular;  Laterality: Left;  REQUEST 7:15 A.M. START----COVID NEGATIVE ON 8/17 1ST CASE STARTE PER LEANA ON 8/7/2020 @ 942AM-  RN PREOP 8/12/2020   T/S-----CLEARED BY CARDS-------PENDING INSURANCE    DEBRIDEMENT OF FOOT Left 9/8/2020    Procedure: DEBRIDEMENT, FOOT;  Surgeon: Rosio Mayes DPM;  Location: [facility] OR;  Service: Podiatry;  Laterality: Left;  request neoxx .   RN Pre Op 9-4-2020, Covid negative on 9/5/20. C A    DEBRIDEMENT OF FOOT  3/4/2021    Procedure: DEBRIDEMENT, FOOT;  Surgeon: Teddy Huber MD;  Location: [facility] OR;  Service: Vascular;;    DEBRIDEMENT OF FOOT Left 3/9/2021    Procedure: DEBRIDEMENT, FOOT, bone biopsy;  Surgeon: Rosio Mayes DPM;  Location: [facility] OR;  Service: Podiatry;  Laterality: Left;  Request neoxx---COVID IN AM  REQUESTING NOON START  RN Phone Pre op.On Blood thinners Plavix and Eliquis.  Covid am of surgery. C A    DEBRIDEMENT OF FOOT Left 5/4/2021    Procedure: DEBRIDEMENT, FOOT;  Surgeon: Farooq Morley DPM;  [facility];  Service: Podiatry;  Laterality: Left;    INSERTION OF TUNNELED CENTRAL VENOUS HEMODIALYSIS CATHETER N/A 1/27/2020    Procedure: Insertion, Catheter, Central Venous, Hemodialysis;  Surgeon: ESTEBAN Gomez III, MD;   Location: [facility] CATH LAB;  Service: Peripheral Vascular;  Laterality: N/A;    PERCUTANEOUS TRANSLUMINAL ANGIOPLASTY N/A 3/4/2021    Procedure: PTA (ANGIOPLASTY, PERCUTANEOUS, TRANSLUMINAL);  Surgeon: Teddy Huber MD;  Location: [facility] OR;  Service: Vascular;  Laterality: N/A;    REMOVAL OF ARTERIOVENOUS GRAFT Left 5/27/2021    Procedure: REMOVAL, GRAFT, LEFT LOWER EXTREMITY, WOUND EXPLORATION;  Surgeon: Teddy Huber MD;  Location: [facility] OR 2ND FLR;  Service: Vascular;  Laterality: Left;    REMOVAL OF NAIL OF DIGIT Left 3/9/2021    Procedure: REMOVAL, NAIL, DIGIT;  Surgeon: Rosio Mayes DPM;  Location: [facility] OR;  Service: Podiatry;  Laterality: Left;    THROMBECTOMY Left 3/4/2021    Procedure: THROMBECTOMY, LEFT LOWER EXTREMITY BYPASS GRAFT, ANGIOGRAM, POSSIBLE INTERVENTION, POSSIBLE LEFT LOWER EXTREMITY BYPASS;  Surgeon: Teddy Huber MD;  Location: [facility] OR;  Service: Vascular;  Laterality: Left;    THROMBECTOMY Left 4/29/2021    Procedure: GRAFT THROMBECTOMY, LEFT LOWER EXTREMITY;  Surgeon: Teddy Huber MD;  Location: [facility] OR 2ND FLR;  Service: Vascular;  Laterality: Left;  14.5 min  1179.85 mGy  341.01 Gycm2  240 ml dye    THROMBECTOMY  10/22/2021    Procedure: THROMBECTOMY;  Surgeon: Saad Arenas MD;  Location: [facility] CATH LAB/EP;  Service: Cardiology;;     Family History   Problem Relation Age of Onset    Diabetes Mother     Diabetes Father     Heart disease Maternal Grandmother     No Known Problems Maternal Grandfather     Diabetes Paternal Grandmother     No Known Problems Paternal Grandfather     Anesthesia problems Neg Hx      Social History     Tobacco Use    Smoking status: Former Smoker    Smokeless tobacco: Never Used   Substance Use Topics    Alcohol use: No    Drug use: Yes     Types: Marijuana     Comment: occassional       Review of patient's allergies indicates:   Allergen Reactions    Ciprofloxacin Itching    Contrast media      Kidney injury     Iodine      Kidney injury       Outpatient meds:  No current facility-administered medications on file prior to encounter.     Current Outpatient Medications on File Prior to Encounter   Medication Sig Dispense Refill    acetaminophen (TYLENOL) 500 MG tablet Take 2 tablets (1,000 mg total) by mouth every 8 (eight) hours as needed for Pain.  0    albuterol (PROVENTIL) 2.5 mg /3 mL (0.083 %) nebulizer solution INHALE 3MLS EVERY 4 HOURS AS NEEDED FOR SHORTNESS OF BREATH FOR UP TO 30 DAYS 180 mL 0    allopurinoL (ZYLOPRIM) 100 MG tablet Take 1 tablet (100 mg total) by mouth once daily. 30 tablet 5    amitriptyline (ELAVIL) 10 MG tablet Take 1 tablet (10 mg total) by mouth nightly as needed for Pain. 30 tablet 12    aspirin (ECOTRIN) 81 MG EC tablet Take 81 mg by mouth once daily at 6am.      atorvastatin (LIPITOR) 80 MG tablet TAKE 1 TABLET(80 MG) BY MOUTH EVERY NIGHT 90 tablet 3    blood-glucose meter,continuous (DEXCOM G6 ) Misc Use to check blood sugars 4 times/day 1 each 0    blood-glucose sensor (DEXCOM G6 SENSOR) Radha Apply one sensor to skin every 10 days 3 each 11    blood-glucose transmitter (DEXCOM G6 TRANSMITTER) Radha Replace transmitter every 3 months to use with dexcom sensor 1 each 3    carvediloL (COREG) 3.125 MG tablet TAKE 1 TABLET(3.125 MG) BY MOUTH TWICE DAILY WITH BREAKFAST AND DINNER 180 tablet 0    clopidogreL (PLAVIX) 75 mg tablet TAKE 1 TABLET(75 MG) BY MOUTH DAILY 90 tablet 3    furosemide (LASIX) 40 MG tablet Take 1 tablet (40 mg total) by mouth once daily. 30 tablet 0    insulin detemir U-100 (LEVEMIR FLEXTOUCH) 100 unit/mL (3 mL) SubQ InPn pen Inject 12 Units into the skin every evening. 20 mL 0    insulin glargine (LANTUS) 100 unit/mL injection Inject 8 Units into the skin every evening.      magnesium oxide (MAG-OX) 400 mg (241.3 mg magnesium) tablet Take 1 tablet (400 mg total) by mouth 2 (two) times daily.  0    melatonin (MELATIN) 3 mg tablet Take 2 tablets (6 mg  total) by mouth nightly as needed for Insomnia.  0    multivit/folic acid/vit K1 (ONE-A-DAY WOMEN'S 50 PLUS ORAL) Take 1 tablet by mouth Daily.      NIFEdipine (PROCARDIA-XL) 30 MG (OSM) 24 hr tablet Take 1 tablet (30 mg total) by mouth once daily. 30 tablet 2    NOVOLOG FLEXPEN U-100 INSULIN 100 unit/mL (3 mL) InPn pen INJECT 5 UNITS INTO SKIN THREE TIMES DAILY WITH MEALS PLUS SLIDING SCALE FOR MAX DAILY DOSE OF 25 UNITS, HOLD IF NOT EATING (Patient taking differently: 8 Units. INJECT 8 UNITS INTO SKIN THREE TIMES DAILY WITH MEALS PLUS SLIDING SCALE FOR MAX DAILY DOSE OF 25 UNITS, HOLD IF NOT EATING) 15 mL 0    ondansetron (ZOFRAN-ODT) 8 MG TbDL Dissolve 1 tablet (8 mg total) by mouth every 8 (eight) hours as needed. 30 tablet 0    oxyCODONE (ROXICODONE) 5 MG immediate release tablet Take 1 tablet (5 mg total) by mouth every 6 (six) hours as needed for Pain. 28 tablet 0    pantoprazole (PROTONIX) 40 MG tablet TAKE 1 TABLET(40 MG) BY MOUTH DAILY 90 tablet 0    pregabalin (LYRICA) 75 MG capsule Take 1 capsule (75 mg total) by mouth 3 (three) times daily. 90 capsule 6    rivaroxaban (XARELTO) 10 mg Tab Take 2.5 mg by mouth nightly.      sertraline (ZOLOFT) 50 MG tablet TAKE 1 TABLET(50 MG) BY MOUTH DAILY 30 tablet 11    sodium bicarbonate 650 MG tablet Take 1 tablet (650 mg total) by mouth 2 (two) times daily. Please hold until follow up with Primary Care Provider 60 tablet 11    [DISCONTINUED] lancing device Misc 1 Device by Misc.(Non-Drug; Combo Route) route 2 (two) times daily with meals. 1 each 0       Scheduled meds:   allopurinoL  100 mg Oral Daily    aspirin  81 mg Oral Daily    atorvastatin  80 mg Oral QHS    carvediloL  3.125 mg Oral BID    clopidogreL  75 mg Oral Daily    insulin detemir U-100  16 Units Subcutaneous QHS    magnesium oxide  400 mg Oral BID    meropenem (MERREM) IVPB  500 mg Intravenous Q12H    NIFEdipine  30 mg Oral Daily    pantoprazole  40 mg Oral Daily    polyethylene  glycol  17 g Oral Daily    pregabalin  75 mg Oral TID    rivaroxaban  2.5 mg Oral Nightly    sertraline  50 mg Oral Daily       Infusions:   sodium chloride 0.9% 100 mL/hr at 08/15/22 1919       PRN meds:  acetaminophen, acetaminophen, aluminum-magnesium hydroxide-simethicone, amitriptyline, dextrose 10%, dextrose 10%, glucose, glucose, insulin aspart U-100, magnesium oxide, magnesium oxide, melatonin, naloxone, ondansetron, oxyCODONE, potassium bicarbonate, potassium bicarbonate, potassium bicarbonate, potassium, sodium phosphates, potassium, sodium phosphates, potassium, sodium phosphates, simethicone, sodium chloride 0.9%    Review of Systems:  Review of Systems   Constitutional: Negative for chills, fever, malaise/fatigue and weight loss.   HENT: Negative for hearing loss and nosebleeds.    Eyes: Negative for blurred vision, double vision and photophobia.   Respiratory: Negative for cough, shortness of breath and wheezing.    Cardiovascular: Negative for chest pain, palpitations and leg swelling.   Gastrointestinal: Negative for abdominal pain, constipation, diarrhea, heartburn, nausea and vomiting.   Genitourinary: Negative for dysuria, frequency and urgency.   Musculoskeletal: Negative for falls, joint pain and myalgias.   Skin: Negative for itching and rash.   Neurological: Negative for dizziness, speech change, focal weakness, loss of consciousness and headaches.   Endo/Heme/Allergies: Does not bruise/bleed easily.   Psychiatric/Behavioral: Negative for depression and substance abuse. The patient is not nervous/anxious.      OBJECTIVE:     Vital Signs and IO (Last 24H):  Temp:  [97.5 °F (36.4 °C)-99.1 °F (37.3 °C)]   Pulse:  [84-94]   Resp:  [16-19]   BP: (102-142)/(55-86)   SpO2:  [98 %-100 %]   I/O last 3 completed shifts:  In: 3971.3 [P.O.:180; I.V.:3594.3; IV Piggyback:197.1]  Out: 600 [Urine:600]    Wt Readings from Last 5 Encounters:   08/13/22 71.8 kg (158 lb 4.6 oz)   08/11/22 68.1 kg (150 lb 2.1  oz)   03/18/22 65.3 kg (144 lb)   03/12/22 65.3 kg (144 lb)   02/01/22 67.6 kg (149 lb 0.5 oz)     Physical Exam:  Physical Exam  Constitutional:       Appearance: She is well-developed. She is not diaphoretic.   HENT:      Head: Normocephalic and atraumatic.   Eyes:      General: No scleral icterus.     Pupils: Pupils are equal, round, and reactive to light.   Cardiovascular:      Rate and Rhythm: Normal rate and regular rhythm.   Pulmonary:      Effort: Pulmonary effort is normal. No respiratory distress.      Breath sounds: No stridor.   Abdominal:      General: There is no distension.      Palpations: Abdomen is soft.   Musculoskeletal:         General: No deformity. Normal range of motion.      Cervical back: Neck supple.   Skin:     General: Skin is warm and dry.      Findings: No erythema or rash.   Neurological:      Mental Status: She is alert and oriented to person, place, and time.      Cranial Nerves: No cranial nerve deficit.   Psychiatric:         Behavior: Behavior normal.       Body mass index is 26.34 kg/m².    Laboratory:  Recent Labs   Lab 08/14/22  0617 08/15/22  0402 08/16/22  0413   * 132* 134*   K 4.3 4.1 4.5    109 112*   CO2 14* 14* 14*   BUN 41* 41* 41*   CREATININE 3.1* 2.7* 2.5*   * 167* 119*       Recent Labs   Lab 08/14/22  0617 08/15/22  0402 08/16/22  0413   CALCIUM 7.9* 7.6* 8.1*   ALBUMIN 2.0* 1.9* 1.7*   PHOS 4.0 3.8 4.6*   MG 1.7 1.6 1.6       Recent Labs   Lab 07/22/20  0453   PTH, Intact 77.8 H       Recent Labs   Lab 08/13/22  1725 08/13/22  2347 08/14/22  0747 08/14/22  1007 08/14/22  1542 08/15/22  0723 08/15/22  1125 08/15/22  1646 08/15/22  2036 08/16/22  0723   POCTGLUCOSE 135* 253* 374* 266* 322* 347* 276* 173* 207* 78       Recent Labs   Lab 12/03/21  1730 03/18/22  1944 03/26/22  1133   Hemoglobin A1C 9.6 H 7.0 H 6.9 H       Recent Labs   Lab 08/14/22  0617 08/15/22  0402 08/16/22  0413   WBC 11.18 8.80 7.35   HGB 10.3* 8.8* 8.4*   HCT 31.7* 26.2*  25.5*    237 257   MCV 91 90 92   MCHC 32.5 33.6 32.9   MONO 7.2  0.8 10.8  1.0 12.5  0.9       Recent Labs   Lab 08/14/22  0617 08/15/22  0402 08/16/22  0413   BILITOT 0.2 0.3 0.2   PROT 6.1 5.5* 5.4*   ALBUMIN 2.0* 1.9* 1.7*   ALKPHOS 110 99 92   ALT 8* 8* 9*   AST 11 10 10       Recent Labs   Lab 01/03/22  1724 01/31/22  1751 03/26/22  0749 08/10/22  1909 08/13/22  1855   Color, UA Yellow   < > Yellow Yellow Yellow   Appearance, UA Cloudy A   < > Cloudy A Clear Cloudy A   pH, UA 6.0   < > 6.0 6.0 6.0   Specific [place], UA 1.020   < > 1.010 1.010 1.025   Protein, UA 3+ A   < > 2+ A 2+ A 2+ A   Glucose, UA Trace A   < > Negative 4+ A Negative   Ketones, UA Negative   < > Negative Negative Negative   Urobilinogen, UA Negative  --   --  Negative Negative   Bilirubin (UA) Negative   < > Negative Negative 1+ A   Occult Blood UA 2+ A   < > 1+ A 1+ A 3+ A   Nitrite, UA Negative   < > Negative Negative Negative   RBC, UA 12 H   < > 45 H 1 12 H   WBC, UA >100 H   < > >100 H 2 >100 H   Bacteria Moderate A   < > None Occasional Many A   Hyaline Casts, UA 0   < > 0 7 A 0    < > = values in this interval not displayed.       Recent Labs   Lab 04/30/21  0842 05/15/21  1310 07/04/21  1457 10/22/21  1214 03/19/22  0211 08/10/22  2108   POC PH 7.448 7.308 L  --   --   --  7.357   POC PCO2 26.2 LL 37.4  --   --   --  35.3   POC HCO3 18.1 L 18.7 L  --   --   --  19.8 L   POC PO2 80 45  --   --   --  39 LL   POC SATURATED O2 97 77 L  --   --   --  72 L   POC BE -6 -8  --   --   --  -6   Sample ARTERIAL VENOUS   < > ARTERIAL VENOUS CAPILLARY    < > = values in this interval not displayed.       Microbiology Results (last 7 days)     Procedure Component Value Units Date/Time    Urine Culture High Risk [[id number]] Collected: 08/15/22 1650    Order Status: Sent Specimen: Urine Updated: 08/15/22 2339    Urine culture [[id number]] Collected: 08/13/22 1855    Order Status: Completed Specimen: Urine Updated: 08/15/22 0115     Urine  Culture, Routine No growth    Narrative:      Specimen Source->Urine        ASSESSMENT/PLAN:     ROSLYN  Acidosis due to ROSLYN and uncontrolled DM2  Hyponatremia due to ROSLYN and uncontrolled DM2  Hyperglycemia with poorly controlled DM2  CKD stage 3 with diabetic proteinuria, baseline sCr around 1, probably < 1  No NSAIDs, ACEI/ARB, IV contrast or other nephrotoxins.  Keep MAP > 60, SBP > 100.  Dose meds for GFR < 30 ml/min.  Renal diet - low K, low phos.  Treat hyperglycemia.    - Obtain VBG to clarify diagnosis and confirm if compensation is adequate.  If Ph < 7.3, would consider addition of oral bicarb vs monitoring for resolution of hyperglycemia and ROSLYN as source of acidosis.  - If acidosis persists beyond AK and hyperglycemia, would consider further work-up as outpatient.  - Obtain renal US to confirm  patency.    Anemia of CKD  Hgb and HCT are acceptable. Monitor.  Will provide NAEEM and/or IV iron PRN.    HTN  BP seem controlled.   Tolerate asymptomatic HTN up to -160.  Continue home meds.  Low sodium diet.    Thank you for allowing us to participate in the care of your patient!   We will follow the patient and provide recommendations as needed.    Patient care time was spent personally by me on the following activities:   · Obtaining a history.  · Examination of patient.  · Providing medical care at the patients bedside.  · Developing a treatment plan with patient or surrogate and bedside caregivers.  · Ordering and reviewing laboratory studies, radiographic studies, pulse oximetry.  · Ordering and performing treatments and interventions.  · Evaluation of patient's response to treatment.  · Discussions with consultants while on the unit and immediately available to the patient.  · Re-evaluation of the patient's condition.  · Documentation in the medical record.     Ricky Oliveira MD    [facility]  [address] [place] [address]  [place] [address] [id number]    [phone] - tel  [phone] - fax    8/16/2022

## 2022-08-17 LAB
ALBUMIN SERPL BCP-MCNC: 1.8 G/DL (ref 3.5–5.2)
ALP SERPL-CCNC: 103 U/L (ref 55–135)
ALT SERPL W/O P-5'-P-CCNC: 6 U/L (ref 10–44)
ANION GAP SERPL CALC-SCNC: 9 MMOL/L (ref 8–16)
AST SERPL-CCNC: 9 U/L (ref 10–40)
BASOPHILS # BLD AUTO: 0.02 K/UL (ref 0–0.2)
BASOPHILS NFR BLD: 0.3 % (ref 0–1.9)
BILIRUB SERPL-MCNC: 0.2 MG/DL (ref 0.1–1)
BUN SERPL-MCNC: 44 MG/DL (ref 6–20)
CALCIUM SERPL-MCNC: 7.9 MG/DL (ref 8.7–10.5)
CHLORIDE SERPL-SCNC: 111 MMOL/L (ref 95–110)
CO2 SERPL-SCNC: 14 MMOL/L (ref 23–29)
CREAT SERPL-MCNC: 2.4 MG/DL (ref 0.5–1.4)
DIFFERENTIAL METHOD: ABNORMAL
EOSINOPHIL # BLD AUTO: 0.2 K/UL (ref 0–0.5)
EOSINOPHIL NFR BLD: 2.6 % (ref 0–8)
ERYTHROCYTE [DISTWIDTH] IN BLOOD BY AUTOMATED COUNT: 14.7 % (ref 11.5–14.5)
EST. GFR  (NO RACE VARIABLE): 23 ML/MIN/1.73 M^2
GLUCOSE SERPL-MCNC: 161 MG/DL (ref 70–110)
HCT VFR BLD AUTO: 26.3 % (ref 37–48.5)
HGB BLD-MCNC: 8.7 G/DL (ref 12–16)
IMM GRANULOCYTES # BLD AUTO: 0.05 K/UL (ref 0–0.04)
IMM GRANULOCYTES NFR BLD AUTO: 0.9 % (ref 0–0.5)
LYMPHOCYTES # BLD AUTO: 1.1 K/UL (ref 1–4.8)
LYMPHOCYTES NFR BLD: 19.3 % (ref 18–48)
MAGNESIUM SERPL-MCNC: 1.7 MG/DL (ref 1.6–2.6)
MCH RBC QN AUTO: 30.2 PG (ref 27–31)
MCHC RBC AUTO-ENTMCNC: 33.1 G/DL (ref 32–36)
MCV RBC AUTO: 91 FL (ref 82–98)
MONOCYTES # BLD AUTO: 0.9 K/UL (ref 0.3–1)
MONOCYTES NFR BLD: 14.8 % (ref 4–15)
NEUTROPHILS # BLD AUTO: 3.6 K/UL (ref 1.8–7.7)
NEUTROPHILS NFR BLD: 62.1 % (ref 38–73)
NRBC BLD-RTO: 0 /100 WBC
PHOSPHATE SERPL-MCNC: 3.9 MG/DL (ref 2.7–4.5)
PLATELET # BLD AUTO: 271 K/UL (ref 150–450)
PMV BLD AUTO: 10.6 FL (ref 9.2–12.9)
POCT GLUCOSE: 129 MG/DL (ref 70–110)
POCT GLUCOSE: 235 MG/DL (ref 70–110)
POCT GLUCOSE: 242 MG/DL (ref 70–110)
POCT GLUCOSE: 267 MG/DL (ref 70–110)
POTASSIUM SERPL-SCNC: 4.8 MMOL/L (ref 3.5–5.1)
PROT SERPL-MCNC: 5.6 G/DL (ref 6–8.4)
RBC # BLD AUTO: 2.88 M/UL (ref 4–5.4)
SODIUM SERPL-SCNC: 134 MMOL/L (ref 136–145)
WBC # BLD AUTO: 5.86 K/UL (ref 3.9–12.7)

## 2022-08-17 PROCEDURE — 12000002 HC ACUTE/MED SURGE SEMI-PRIVATE ROOM

## 2022-08-17 PROCEDURE — 25000003 PHARM REV CODE 250: Performed by: NURSE PRACTITIONER

## 2022-08-17 PROCEDURE — 63600175 PHARM REV CODE 636 W HCPCS: Performed by: NURSE PRACTITIONER

## 2022-08-17 PROCEDURE — 97110 THERAPEUTIC EXERCISES: CPT | Mod: CQ

## 2022-08-17 PROCEDURE — 84100 ASSAY OF PHOSPHORUS: CPT | Performed by: INTERNAL MEDICINE

## 2022-08-17 PROCEDURE — 85025 COMPLETE CBC W/AUTO DIFF WBC: CPT | Performed by: INTERNAL MEDICINE

## 2022-08-17 PROCEDURE — 80053 COMPREHEN METABOLIC PANEL: CPT | Performed by: INTERNAL MEDICINE

## 2022-08-17 PROCEDURE — 36415 COLL VENOUS BLD VENIPUNCTURE: CPT | Performed by: INTERNAL MEDICINE

## 2022-08-17 PROCEDURE — 83735 ASSAY OF MAGNESIUM: CPT | Performed by: INTERNAL MEDICINE

## 2022-08-17 PROCEDURE — 25000003 PHARM REV CODE 250: Performed by: INTERNAL MEDICINE

## 2022-08-17 RX ORDER — SODIUM BICARBONATE 650 MG/1
1300 TABLET ORAL 3 TIMES DAILY
Status: DISCONTINUED | OUTPATIENT
Start: 2022-08-17 | End: 2022-08-18 | Stop reason: HOSPADM

## 2022-08-17 RX ADMIN — CARVEDILOL 3.12 MG: 3.12 TABLET, FILM COATED ORAL at 08:08

## 2022-08-17 RX ADMIN — CLOPIDOGREL BISULFATE 75 MG: 75 TABLET, FILM COATED ORAL at 08:08

## 2022-08-17 RX ADMIN — NIFEDIPINE 30 MG: 30 TABLET, FILM COATED, EXTENDED RELEASE ORAL at 08:08

## 2022-08-17 RX ADMIN — PANTOPRAZOLE SODIUM 40 MG: 40 TABLET, DELAYED RELEASE ORAL at 08:08

## 2022-08-17 RX ADMIN — PREGABALIN 75 MG: 75 CAPSULE ORAL at 09:08

## 2022-08-17 RX ADMIN — SODIUM CHLORIDE: 0.9 INJECTION, SOLUTION INTRAVENOUS at 05:08

## 2022-08-17 RX ADMIN — INSULIN ASPART 2 UNITS: 100 INJECTION, SOLUTION INTRAVENOUS; SUBCUTANEOUS at 09:08

## 2022-08-17 RX ADMIN — SODIUM BICARBONATE 650 MG TABLET 1300 MG: at 11:08

## 2022-08-17 RX ADMIN — RIVAROXABAN 2.5 MG: 2.5 TABLET, FILM COATED ORAL at 09:08

## 2022-08-17 RX ADMIN — MEROPENEM AND SODIUM CHLORIDE 500 MG: 500 INJECTION, SOLUTION INTRAVENOUS at 05:08

## 2022-08-17 RX ADMIN — PREGABALIN 75 MG: 75 CAPSULE ORAL at 02:08

## 2022-08-17 RX ADMIN — ATORVASTATIN CALCIUM 80 MG: 40 TABLET, FILM COATED ORAL at 09:08

## 2022-08-17 RX ADMIN — ASPIRIN 81 MG: 81 TABLET, COATED ORAL at 05:08

## 2022-08-17 RX ADMIN — INSULIN ASPART 6 UNITS: 100 INJECTION, SOLUTION INTRAVENOUS; SUBCUTANEOUS at 08:08

## 2022-08-17 RX ADMIN — SODIUM BICARBONATE 650 MG TABLET 1300 MG: at 02:08

## 2022-08-17 RX ADMIN — SERTRALINE HYDROCHLORIDE 50 MG: 50 TABLET ORAL at 08:08

## 2022-08-17 RX ADMIN — Medication 400 MG: at 09:08

## 2022-08-17 RX ADMIN — INSULIN ASPART 4 UNITS: 100 INJECTION, SOLUTION INTRAVENOUS; SUBCUTANEOUS at 05:08

## 2022-08-17 RX ADMIN — SODIUM BICARBONATE 650 MG TABLET 1300 MG: at 09:08

## 2022-08-17 RX ADMIN — INSULIN DETEMIR 16 UNITS: 100 INJECTION, SOLUTION SUBCUTANEOUS at 09:08

## 2022-08-17 RX ADMIN — CARVEDILOL 3.12 MG: 3.12 TABLET, FILM COATED ORAL at 09:08

## 2022-08-17 RX ADMIN — Medication 400 MG: at 08:08

## 2022-08-17 RX ADMIN — PREGABALIN 75 MG: 75 CAPSULE ORAL at 08:08

## 2022-08-17 RX ADMIN — ALLOPURINOL 100 MG: 100 TABLET ORAL at 08:08

## 2022-08-17 NOTE — PROGRESS NOTES
[facility] Hospital Medicine  Progress Note    Patient Name: Suyapa Connelly  MRN: [id number]  Patient Class: IP- Inpatient   Admission Date: 8/13/2022  Length of Stay: 4 days  Attending Physician: Caty Otero MD  Primary Care Provider: Magen Christensen MD        Subjective:     Principal Problem:Severe sepsis        HPI:   Suyapa Connelly is a 56 year old female with a past medical history of HTN, DM, CKD, CAD and past surgical history of bilateral AKA due to PAD and CABG, who presented to the ED with confusion and unresponsiveness. She was admitted three days ago for confusion with a CT notable for hydrocephalus, but her  signed her out AMA as he felt she had improved enough to take her home and no longer wanted her to be in the hospital, despite her persistent confusion. She was responsive and communicating more than she was on initial admit, so he said he felt he could care for her better at home. They went to [facility] today, where she started drooling and became confused and unresponsive. She denied any complaints prior. She denies complaint at this time.     Upon arrival to the ED, she was minimally responsive. Staff performed a sternal rub, which woke her up quickly. She was hypotensive and received IV boluses, which improved her blood pressure and mentation. She is currently awake, alert and oriented to self, place and situation. She is not correct with dates. Dr. Arthur with Neurology was consulted by the ED, and did not recommend emergent treatment. He did say there was a possibility of the patient having a lumbar puncture at some point tomorrow. Urinalysis was positive for infection. Her lactic acid level was within normal limits. She has an acute on chronic kidney injury. CBC shows chronic anemia. Hospital Medicine consulted for admission and further management.          Overview/Hospital Course:  No notes on file    Interval History: Afebrile. Mental status improved to  "baseline.  Patient's  is at bedside. Patient answering questions appropriately.  Denies any focal subjective complaints.  No abdominal pain or urinary difficulties reported.  Patient denies any new focal subjective complaints.    Review of Systems   Constitutional:  Positive for fatigue. Negative for chills and fever.   HENT:  Negative for congestion and sore throat.    Eyes:  Negative for visual disturbance.   Respiratory:  Negative for cough and shortness of breath.    Cardiovascular:  Negative for chest pain and palpitations.   Gastrointestinal:  Negative for abdominal pain, nausea and vomiting.   Endocrine: Negative for cold intolerance and heat intolerance.   Genitourinary:  Negative for dysuria and hematuria.   Musculoskeletal:  Negative for arthralgias and myalgias.   Skin:  Negative for pallor and rash.   Neurological:  Negative for tremors and seizures.   Hematological:  Negative for adenopathy. Does not bruise/bleed easily.   Psychiatric/Behavioral:  Positive for confusion ("i felt like my brain was messed up, like i was there but i wasnt there") and decreased concentration. Negative for hallucinations.    All other systems reviewed and are negative.  Objective:     Vital Signs (Most Recent):  Temp: 98.3 °F (36.8 °C) (08/17/22 0824)  Pulse: 92 (08/17/22 0824)  Resp: 15 (08/17/22 0824)  BP: (!) 121/59 (08/17/22 0824)  SpO2: 99 % (08/17/22 0824)   Vital Signs (24h Range):  Temp:  [98 °F (36.7 °C)-98.8 °F (37.1 °C)] 98.3 °F (36.8 °C)  Pulse:  [80-97] 92  Resp:  [15-20] 15  SpO2:  [97 %-100 %] 99 %  BP: (104-127)/(55-73) 121/59     Weight: 71.8 kg (158 lb 4.6 oz)  Body mass index is 26.34 kg/m².    Intake/Output Summary (Last 24 hours) at 8/17/2022 0918  Last data filed at 8/17/2022 0600  Gross per 24 hour   Intake 550 ml   Output 600 ml   Net -50 ml        Physical Exam  Vitals and nursing note reviewed.   Constitutional:       General: She is awake. She is not in acute distress.     Appearance: She is " well-developed. She is obese. She is ill-appearing (chronically ill).   HENT:      Head: Normocephalic and atraumatic.      Mouth/Throat:      Lips: Pink.      Mouth: Mucous membranes are moist.   Eyes:      Conjunctiva/sclera: Conjunctivae normal.      Pupils: Pupils are equal, round, and reactive to light.   Neck:      Thyroid: No thyromegaly.      Vascular: No JVD.   Cardiovascular:      Rate and Rhythm: Normal rate and regular rhythm.      Heart sounds: Normal heart sounds, S1 normal and S2 normal. No murmur heard.    No friction rub. No gallop.   Pulmonary:      Effort: Pulmonary effort is normal.      Breath sounds: Normal breath sounds.   Abdominal:      General: Abdomen is protuberant. Bowel sounds are normal. There is no distension.      Palpations: Abdomen is soft. There is no mass.      Tenderness: There is no abdominal tenderness.   Musculoskeletal:         General: Normal range of motion.      Cervical back: Full passive range of motion without pain, normal range of motion and neck supple.      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      Left Lower Extremity: Left leg is amputated above knee.   Skin:     General: Skin is warm and dry.      Capillary Refill: Capillary refill takes 2 to 3 seconds.   Neurological:      General: No focal deficit present.      Mental Status: She is alert and oriented to person, place, and time.      Cranial Nerves: No cranial nerve deficit.      Sensory: Sensation is intact.      Motor: Motor function is intact.      Comments: Oriented to person, place and situation, unable to tell birthdate  Converses well, no dysarthria and no aphasia  Moves all extremities well with equal strength   Psychiatric:         Behavior: Behavior normal. Behavior is cooperative.       Significant Labs: All pertinent labs within the past 24 hours have been reviewed.  CBC:   Recent Labs   Lab 08/16/22  0413   WBC 7.35   HGB 8.4*   HCT  25.5*          CMP:   Recent Labs   Lab 08/16/22  0413   *   K 4.5   *   CO2 14*   *   BUN 41*   CREATININE 2.5*   CALCIUM 8.1*   PROT 5.4*   ALBUMIN 1.7*   BILITOT 0.2   ALKPHOS 92   AST 10   ALT 9*   ANIONGAP 8       TSH:   Recent Labs   Lab 08/13/22  2204   TSH 0.885       Urine Culture:   Recent Labs   Lab 08/15/22  1650   LABURIN No growth       Urine Studies:   No results for input(s): COLORU, APPEARANCEUA, PHUR, SPECGRAV, PROTEINUA, GLUCUA, KETONESU, BILIRUBINUA, OCCULTUA, NITRITE, UROBILINOGEN, LEUKOCYTESUR, RBCUA, WBCUA, BACTERIA, SQUAMEPITHEL, HYALINECASTS in the last 48 hours.    Invalid input(s): WRIGHTSUR    Microbiology Results (last 7 days)       Procedure Component Value Units Date/Time    Urine Culture High Risk [[id number]] Collected: 08/15/22 1650    Order Status: Completed Specimen: Urine Updated: 08/16/22 2221     Urine Culture, Routine No growth    Narrative:      Indicated criteria for high risk culture:->Other  Other (specify):->severe sepsis    Urine culture [[id number]] Collected: 08/13/22 1855    Order Status: Completed Specimen: Urine Updated: 08/15/22 0115     Urine Culture, Routine No growth    Narrative:      Specimen Source->Urine          Significant Imaging:   CT Head (08-10-22):  1. Suspected vasogenic edema in the bilateral parietooccipital lobes which can be seen with PRES.  Recommend clinical correlation and consider further evaluation with MRI.  2. Senescent changes and chronic microvascular ischemic changes.    MRI Brain:  The study is mildly motion degraded.  There is however no acute abnormality and little if any change compared to a prior brain MRI dated 06/02/2021.  The ventricles are mildly prominent but without definite change.  There is no midline shift.  There is at least moderate white matter change.  These findings however were present on the comparison MRI and without any significant or definite change.  Findings do not strongly suggest PRES.   There is no hemorrhage, mass or acute infarction.    MRA Brain:  Mild irregularity of the left greater than right cavernous internal carotid arteries without significant atherosclerotic narrowing.  No other high-grade stenosis, large vessel occlusion or aneurysm.    MRA Neck:   Mild narrowing at bilateral carotid bifurcations with less than 50% stenosis.  Otherwise, unremarkable MRA of the neck vasculature.    CXR: There are linear regions of atelectasis and bilateral lower lung zones.    CT Head:  There is no evidence acute intracranial process.  There is cerebral atrophy with ventricular dilatation and periventricular white matter changes.  The degree of ventricular dilatation is similar to what was seen on MRI from 08/11/2022.    CXR: Left internal jugular central venous catheter placement.        Assessment/Plan:      * Severe sepsis  This patient does have evidence of infective focus  My overall impression is sepsis. Vital signs were reviewed and noted in progress note.  Antibiotics given-   Antibiotics (From admission, onward)            Start     Stop Route Frequency Ordered    08/14/22 0500  meropenem-0.9% sodium chloride 500 mg/50 mL IVPB         -- IV Every 12 hours (non-standard times) 08/14/22 0002        Cultures were taken-   Microbiology Results (last 7 days)     Procedure Component Value Units Date/Time    Urine culture [[id number]] Collected: 08/13/22 1855    Order Status: Completed Specimen: Urine Updated: 08/15/22 0115     Urine Culture, Routine No growth    Narrative:      Specimen Source->Urine        Latest lactate reviewed, they are-  Recent Labs   Lab 08/13/22  1747 08/13/22  2204   LACTATE 1.2 1.4       Organ dysfunction indicated by Acute kidney injury and Encephalopathy   Source- urine    Source control Achieved by- antibiotics      Metabolic acidosis  Patient appears to have acute kidney injury on chronic kidney disease stage 3 with significant underlying metabolic acidosis.  Patient has  never been evaluated by nephrologist.  Continue oral sodium bicarbonate supplementation.  Follow Nephrology recommendations.      Communicating hydrocephalus  Consult Neuro-Dr Arthur    No emergent intervention   May perform LP  Seen on CT of head:  IMPRESSION:  1. Moderate ventriculomegaly involving the lateral ventricles and 3rd ventricle demonstrating mild  progression since the oldest comparison 05/25/2021, disproportionate to the degree of peripheral  sulcal prominence and demonstrating increased apical sulcal effacement bilaterally. 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 Consider further assessment with LP and opening pressure, and neurologic or  neurosurgical consult. Nuclear medicine cisternography may be helpful in further assessment.  2. Gradually increasing periventricular white matter hypodensities are nonspecific and might  represent progression of microvascular ischemia in this age group, however in the setting of  hydronephrosis this raises concern for an element of transependymal fluid migration.  3. There are small chronic subependymal nodules in the anterior which are unchanged, favoring  benign lesions such as subependymal hamartomas. Correlate clinically for tuberous sclerosis.  4. Chronic densely calcified dural-based 14 mm lesion in the left middle cranial fossa is unchanged,  consistent with densely calcified meningioma or incidental dural ossification without significant mass  effect.  5. These findings initiated a results reporting process. An addendum will be issued at the time of  clinician notification.    Confusion  Transient, had episode of altered mental status that resolved with IV fluid hydration    Status post above-knee amputation of both lower extremities  Noted  Fall precautions      UTI (urinary tract infection)  Follow urine culture  Meropenem from zosyn due to kidney injury and hx of ESBL and  MDRO, historical culture showed sensitivity      Acute renal failure superimposed on stage 3 chronic kidney disease    Patient with acute kidney injury likely d/t Pre-renal azotemia. ROSLYN is currently stable. Labs reviewed- Renal function/electrolytes with Estimated Creatinine Clearance: 26.3 mL/min (A) (based on SCr of 2.4 mg/dL (H)). according to latest data. Monitor urine output and serial BMP and adjust therapy as needed. Avoid nephrotoxins and renally dose meds for GFR listed above.  Follow Dr. Oliveira recommendations.  Patient is currently on oral sodium bicarbonate supplementation.      Class 2 severe obesity due to excess calories with serious comorbidity in adult  Body mass index is 26.34 kg/m². Morbid obesity complicates all aspects of disease management from diagnostic modalities to treatment. Weight loss encouraged and health benefits explained to patient.         Impaired functional mobility and endurance  PT/OT consulted  Turn q2h  Assist with ADLs      Hyponatremia  Na on admit 132  Gentle IVF rehydration    Paroxysmal atrial fibrillation  Patient with Paroxysmal (<7 days) atrial fibrillation which is controlled currently with Beta Blocker and Calcium Channel Blocker. Patient is currently in sinus rhythm.[id number] Score: 3. HASBLED Score: Unable to calculate. Anticoagulation indicated. Anticoagulation done with xarelto.        Anemia  Patient's anemia is currently controlled. Has not received any PRBCs to date.. Etiology likely d/t chronic disease  Current CBC reviewed-   Lab Results   Component Value Date    HGB 8.8 (L) 08/15/2022    HCT 26.2 (L) 08/15/2022     Monitor serial CBC and transfuse if patient becomes hemodynamically unstable, symptomatic or H/H drops below 7/21.         CAD (coronary artery disease)    Patient with known CAD s/p CABG, which is controlled Will continue ASA, Plavix and Statin and monitor for S/Sx of angina/ACS. Continue to monitor on telemetry.       Type 2 diabetes  mellitus with hyperglycemia, with long-term current use of insulin  Patient's FSGs are controlled on current medication regimen.  Last A1c reviewed-   Lab Results   Component Value Date    HGBA1C 6.9 (H) 03/26/2022     Most recent fingerstick glucose reviewed-   Recent Labs   Lab 08/14/22  1007 08/14/22  1542 08/15/22  0723   POCTGLUCOSE 266* 322* 347*     Current correctional scale  Medium  Maintain anti-hyperglycemic dose as follows-   Antihyperglycemics (From admission, onward)            Start     Stop Route Frequency Ordered    08/14/22 2100  insulin detemir U-100 pen 16 Units         -- SubQ Nightly 08/14/22 1531    08/14/22 0111  insulin aspart U-100 pen 1-10 Units         -- SubQ Before meals & nightly PRN 08/14/22 0011        Hold Oral hypoglycemics while patient is in the hospital.    Mixed hyperlipidemia   Patient is chronically on statin.will continue for now. Monitor clinically. Last LDL was   Lab Results   Component Value Date    LDLCALC 177.6 (H) 12/03/2021            CAROLIN (generalized anxiety disorder)  Noted, chronic      Essential hypertension  Chronic, controlled.  Latest blood pressure and vitals reviewed-   Temp:  [97.8 °F (36.6 °C)-99.9 °F (37.7 °C)]   Pulse:  []   Resp:  [16-20]   BP: (100-130)/(56-80)   SpO2:  [97 %-100 %] .   Home meds for hypertension were reviewed and noted below.   Hypertension Medications             carvediloL (COREG) 3.125 MG tablet TAKE 1 TABLET(3.125 MG) BY MOUTH TWICE DAILY WITH BREAKFAST AND DINNER    furosemide (LASIX) 40 MG tablet Take 1 tablet (40 mg total) by mouth once daily.    NIFEdipine (PROCARDIA-XL) 30 MG (OSM) 24 hr tablet Take 1 tablet (30 mg total) by mouth once daily.          While in the hospital, will manage blood pressure as follows; Continue home antihypertensive regimen, hold losartan    Will utilize p.r.n. blood pressure medication only if patient's blood pressure greater than  180/110 and she develops symptoms such as worsening chest pain  or shortness of breath.        Moderate malnutrition  Nutrition consulted. Most recent weight and BMI monitored-                         Measurements:  Wt Readings from Last 1 Encounters:   08/13/22 71.8 kg (158 lb 4.6 oz)   Body mass index is 26.34 kg/m².    Recommendations:      Patient has been screened and assessed by RD. RD will follow patient.      Discussed with patient's .  Expected DC home soon once cleared by Nephrology team.  VTE Risk Mitigation (From admission, onward)         Ordered     IP VTE HIGH RISK PATIENT  Once         08/13/22 2128              Discharge Planning   DEVAN: 8/17/2022     Code Status: Full Code   Is the patient medically ready for discharge?:     Reason for patient still in hospital (select all that apply): Patient trending condition and Consult recommendations  Discharge Plan A: Home with family                  Caty Otero MD  Department of Hospital Medicine   [facility]

## 2022-08-17 NOTE — PLAN OF CARE
Problem: Physical Therapy  Goal: Physical Therapy Goal  Description: Goals to be met by: 2022     Patient will increase functional independence with mobility by performin. Supine to sit with Modified [place]  2. Bed to chair transfer with Contact Guard Assistance using No Assistive Device anterior and posterior scooting  3. Wheelchair propulsion x250 feet with Modified [place] using bilateral uppper extremities    Outcome: Ongoing, Progressing     Posterior scoot TF bed>w/c with CGA. W/c mobility x250' with SBA, BUEs, and 2 rest breaks due to fatigue and mild shoulder pain. Will continue to progress patient toward physical therapy goals.

## 2022-08-17 NOTE — PT/OT/SLP PROGRESS
"Physical Therapy Treatment    Patient Name:  Suyapa Connelly   MRN:  [id number]    Recommendations:     Discharge Recommendations:  home health PT   Discharge Equipment Recommendations: none   Barriers to discharge: None    Assessment:     Suyapa Connelly is a 56 y.o. female admitted with a medical diagnosis of Severe sepsis.  She presents with the following impairments/functional limitations:  weakness, impaired endurance, impaired self care skills, impaired functional mobility, impaired cognition, pain, impaired cardiopulmonary response to activity. Pt lightly sleeping and easily roused, pleasant, agreeable to TF to w/c and perform w/c mobility. Reports no pain at rest.  present and encouraging.  Performed posterior scoot TF to w/c with CGA, then propelled w/c with BUEs x 250' with SBA and 2 rest breaks due to fatigue and c/o mild shoulder pain. HR 97 bpm during final rest break.   Remains agreeable to HHPT following discharge.  Per , "the only thing I usually have to help her with is getting in and out of the car."   Remained up in w/c with call light, nurse aware,  present, and fall prevention reviewed.     Rehab Prognosis: Good; patient would benefit from acute skilled PT services to address these deficits and reach maximum level of function.    Recent Surgery: * No surgery found *      Plan:     During this hospitalization, patient to be seen 6 x/week to address the identified rehab impairments via therapeutic activities, therapeutic exercises, wheelchair management/training and progress toward the following goals:    · Plan of Care Expires:  08/30/22    Subjective     Chief Complaint: mild shoulder pain, fatigue with extended w/c mobility  Patient/Family Comments/goals: go home and get some therapy with HHPT  Pain/Comfort:  · Pain Rating 1: other (see comments) (unrated mild shoulder pain during w/c mobility)  · Location - Side 1: Bilateral  · Location 1: shoulder  · Pain Addressed 1: " Cessation of Activity, Distraction  · Pain Rating Post-Intervention 1: 0/10      Objective:     Communicated with nurse Dominguez prior to session.  Patient found supine with telemetry, peripheral IV, PureWick upon PT entry to room.     General Precautions: Standard, fall   Orthopedic Precautions:N/A   Braces: N/A  Respiratory Status: Room air     Functional Mobility:  · Bed Mobility:     · Supine to Sit: stand by assistance  · Transfers:     · Bed to Chair: contact guard assistance with  no AD  using  Posterior Scoot, and with therapist stabilizing w/c      AM-PAC 6 CLICK MOBILITY          Therapeutic Activities and Exercises:       Patient left up in chair with all lines intact, call button in reach, nurse notified and  present..    GOALS:   Multidisciplinary Problems     Physical Therapy Goals        Problem: Physical Therapy    Goal Priority Disciplines Outcome Goal Variances Interventions   Physical Therapy Goal     PT, PT/OT Ongoing, Progressing     Description: Goals to be met by: 2022     Patient will increase functional independence with mobility by performin. Supine to sit with Modified [place]  2. Bed to chair transfer with Contact Guard Assistance using No Assistive Device anterior and posterior scooting  3. Wheelchair propulsion x250 feet with Modified [place] using bilateral uppper extremities                     Time Tracking:     PT Received On: 22  PT Start Time: 0953     PT Stop Time: 1014  PT Total Time (min): 21 min     Billable Minutes: Therapeutic Exercise 21    Treatment Type: Treatment  PT/PTA: PTA     PTA Visit Number: 1     2022

## 2022-08-17 NOTE — PLAN OF CARE
POC reviewed with pt and spouse. Patient is alert and oriented x 4. Continuous cardiac monitoring, TELE # 706 -NSR. A-febrile throughout the shift.  Lt IJ infusing NS @100 cc/hr and Rt EJ saline locked. Meds given per MAR. Blood glucose monitored. Aspiration and Fall precautions. Neuro checks as ordered. Repositions self w assist to wc. Purposeful hourly/q2hr rounding done during shift to promote patient safety. NAD noted. Safety maintained with side rails up x3, bed wheels locked, bed in lowest positioned, call light in reach. Patient educated to call for assistance with ambulation if needed. Pt remains free of falls. Will continue to monitor.

## 2022-08-17 NOTE — SUBJECTIVE & OBJECTIVE
"Interval History: Afebrile. Mental status improved to baseline.  Patient's  is at bedside. Patient answering questions appropriately.  Denies any focal subjective complaints.  No abdominal pain or urinary difficulties reported.  Patient denies any new focal subjective complaints.    Review of Systems   Constitutional:  Positive for fatigue. Negative for chills and fever.   HENT:  Negative for congestion and sore throat.    Eyes:  Negative for visual disturbance.   Respiratory:  Negative for cough and shortness of breath.    Cardiovascular:  Negative for chest pain and palpitations.   Gastrointestinal:  Negative for abdominal pain, nausea and vomiting.   Endocrine: Negative for cold intolerance and heat intolerance.   Genitourinary:  Negative for dysuria and hematuria.   Musculoskeletal:  Negative for arthralgias and myalgias.   Skin:  Negative for pallor and rash.   Neurological:  Negative for tremors and seizures.   Hematological:  Negative for adenopathy. Does not bruise/bleed easily.   Psychiatric/Behavioral:  Positive for confusion ("i felt like my brain was messed up, like i was there but i wasnt there") and decreased concentration. Negative for hallucinations.    All other systems reviewed and are negative.  Objective:     Vital Signs (Most Recent):  Temp: 98.3 °F (36.8 °C) (08/17/22 0824)  Pulse: 92 (08/17/22 0824)  Resp: 15 (08/17/22 0824)  BP: (!) 121/59 (08/17/22 0824)  SpO2: 99 % (08/17/22 0824)   Vital Signs (24h Range):  Temp:  [98 °F (36.7 °C)-98.8 °F (37.1 °C)] 98.3 °F (36.8 °C)  Pulse:  [80-97] 92  Resp:  [15-20] 15  SpO2:  [97 %-100 %] 99 %  BP: (104-127)/(55-73) 121/59     Weight: 71.8 kg (158 lb 4.6 oz)  Body mass index is 26.34 kg/m².    Intake/Output Summary (Last 24 hours) at 8/17/2022 0918  Last data filed at 8/17/2022 0600  Gross per 24 hour   Intake 550 ml   Output 600 ml   Net -50 ml        Physical Exam  Vitals and nursing note reviewed.   Constitutional:       General: She is " awake. She is not in acute distress.     Appearance: She is well-developed. She is obese. She is ill-appearing (chronically ill).   HENT:      Head: Normocephalic and atraumatic.      Mouth/Throat:      Lips: Pink.      Mouth: Mucous membranes are moist.   Eyes:      Conjunctiva/sclera: Conjunctivae normal.      Pupils: Pupils are equal, round, and reactive to light.   Neck:      Thyroid: No thyromegaly.      Vascular: No JVD.   Cardiovascular:      Rate and Rhythm: Normal rate and regular rhythm.      Heart sounds: Normal heart sounds, S1 normal and S2 normal. No murmur heard.    No friction rub. No gallop.   Pulmonary:      Effort: Pulmonary effort is normal.      Breath sounds: Normal breath sounds.   Abdominal:      General: Abdomen is protuberant. Bowel sounds are normal. There is no distension.      Palpations: Abdomen is soft. There is no mass.      Tenderness: There is no abdominal tenderness.   Musculoskeletal:         General: Normal range of motion.      Cervical back: Full passive range of motion without pain, normal range of motion and neck supple.      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      Left Lower Extremity: Left leg is amputated above knee.   Skin:     General: Skin is warm and dry.      Capillary Refill: Capillary refill takes 2 to 3 seconds.   Neurological:      General: No focal deficit present.      Mental Status: She is alert and oriented to person, place, and time.      Cranial Nerves: No cranial nerve deficit.      Sensory: Sensation is intact.      Motor: Motor function is intact.      Comments: Oriented to person, place and situation, unable to tell birthdate  Converses well, no dysarthria and no aphasia  Moves all extremities well with equal strength   Psychiatric:         Behavior: Behavior normal. Behavior is cooperative.       Significant Labs: All pertinent labs within the past 24 hours have been reviewed.  CBC:    Recent Labs   Lab 08/16/22  0413   WBC 7.35   HGB 8.4*   HCT 25.5*          CMP:   Recent Labs   Lab 08/16/22  0413   *   K 4.5   *   CO2 14*   *   BUN 41*   CREATININE 2.5*   CALCIUM 8.1*   PROT 5.4*   ALBUMIN 1.7*   BILITOT 0.2   ALKPHOS 92   AST 10   ALT 9*   ANIONGAP 8       TSH:   Recent Labs   Lab 08/13/22  2204   TSH 0.885       Urine Culture:   Recent Labs   Lab 08/15/22  1650   LABURIN No growth       Urine Studies:   No results for input(s): COLORU, APPEARANCEUA, PHUR, SPECGRAV, PROTEINUA, GLUCUA, KETONESU, BILIRUBINUA, OCCULTUA, NITRITE, UROBILINOGEN, LEUKOCYTESUR, RBCUA, WBCUA, BACTERIA, SQUAMEPITHEL, HYALINECASTS in the last 48 hours.    Invalid input(s): WRIGHTSUR    Microbiology Results (last 7 days)       Procedure Component Value Units Date/Time    Urine Culture High Risk [[id number]] Collected: 08/15/22 1650    Order Status: Completed Specimen: Urine Updated: 08/16/22 2221     Urine Culture, Routine No growth    Narrative:      Indicated criteria for high risk culture:->Other  Other (specify):->severe sepsis    Urine culture [[id number]] Collected: 08/13/22 1855    Order Status: Completed Specimen: Urine Updated: 08/15/22 0115     Urine Culture, Routine No growth    Narrative:      Specimen Source->Urine          Significant Imaging:   CT Head (08-10-22):  1. Suspected vasogenic edema in the bilateral parietooccipital lobes which can be seen with PRES.  Recommend clinical correlation and consider further evaluation with MRI.  2. Senescent changes and chronic microvascular ischemic changes.    MRI Brain:  The study is mildly motion degraded.  There is however no acute abnormality and little if any change compared to a prior brain MRI dated 06/02/2021.  The ventricles are mildly prominent but without definite change.  There is no midline shift.  There is at least moderate white matter change.  These findings however were present on the comparison MRI and without any  significant or definite change.  Findings do not strongly suggest PRES.  There is no hemorrhage, mass or acute infarction.    MRA Brain:  Mild irregularity of the left greater than right cavernous internal carotid arteries without significant atherosclerotic narrowing.  No other high-grade stenosis, large vessel occlusion or aneurysm.    MRA Neck:   Mild narrowing at bilateral carotid bifurcations with less than 50% stenosis.  Otherwise, unremarkable MRA of the neck vasculature.    CXR: There are linear regions of atelectasis and bilateral lower lung zones.    CT Head:  There is no evidence acute intracranial process.  There is cerebral atrophy with ventricular dilatation and periventricular white matter changes.  The degree of ventricular dilatation is similar to what was seen on MRI from 08/11/2022.    CXR: Left internal jugular central venous catheter placement.

## 2022-08-17 NOTE — ASSESSMENT & PLAN NOTE
Patient appears to have acute kidney injury on chronic kidney disease stage 3 with significant underlying metabolic acidosis.  Patient has never been evaluated by nephrologist.  Continue oral sodium bicarbonate supplementation.  Follow Nephrology recommendations.

## 2022-08-17 NOTE — ASSESSMENT & PLAN NOTE
Patient with acute kidney injury likely d/t Pre-renal azotemia. ROSLYN is currently stable. Labs reviewed- Renal function/electrolytes with Estimated Creatinine Clearance: 26.3 mL/min (A) (based on SCr of 2.4 mg/dL (H)). according to latest data. Monitor urine output and serial BMP and adjust therapy as needed. Avoid nephrotoxins and renally dose meds for GFR listed above.  Follow Dr. Oliveira recommendations.  Patient is currently on oral sodium bicarbonate supplementation.

## 2022-08-17 NOTE — PROGRESS NOTES
Nephrology Consult Note        Patient Name: Suyapa Connelly  MRN: [id number]    Patient Class: IP- Inpatient   Admission Date: 8/13/2022  Length of Stay: 4 days  Date of Service: 8/17/2022    Attending Physician: Caty Otero MD  Primary Care Provider: Magen Christensen MD    Reason for Consult: roslyn/acidosis/hyponatremia/hyperglycemia/dm2/ckd3/anemia/htn    SUBJECTIVE:     HPI: 56F with HTN, DM, CKD stage 3, bilateral AKA due to PAD, CABG, presented with confusion and unresponsiveness on 8/13. She was admitted on 8/10 for confusion with a CT notable for hydrocephalus, but her  signed her out AMA as he felt she had improved enough to take her home and no longer wanted her to be in the hospital, despite her persistent confusion. She was responsive and communicating more than she was on initial admit, so he felt he could care for her better at home.     They went to [facility] on 8/13, where she started drooling and became confused and unresponsive.      Upon arrival to the ED, she was minimally responsive. Staff performed a sternal rub, which woke her up quickly. She was hypotensive and received IV boluses, which improved her blood pressure and mentation.    Labs were pertinent for hyponatremia, acidosis, ROSLYN, hypoalbuminemia. She has chronic, probably diabetic, proteinuria and occasional hematuria and yeast in urine. On imaging in 5/2021 had bilateral retroperitoneal hematomas, but no renal obstruction. Renal is consulted for co-management of acidosis and roslyn.     Since 2019 she had repeated episodes of SHERRELL, which seem to be recovering to baseline renal function around 1 - most recent event in 3/2022. Brief episodes of hyponatremia and uncontrolled acidosis seem to coincide with the ROSLYN episodes as well as uncontrolled BGs. Lactate is currently normal.    Past Medical History:   Diagnosis Date    Anxiety     Chronic pain syndrome     CKD (chronic kidney disease), stage III     Depression     Diabetes  mellitus     type 2    Diabetes mellitus, type 2     GERD (gastroesophageal reflux disease)     Hyperemesis 3/23/2021    Hyperlipemia     Hypertension     Hypokalemia 3/23/2021    Myocardial infarction 2010    minor-caused by stress per pt.    Osteomyelitis     Osteomyelitis of left foot 4/30/2021    PVD (peripheral vascular disease)     Vaginal delivery     x1     Past Surgical History:   Procedure Laterality Date    ABOVE-KNEE AMPUTATION Left 5/18/2021    Procedure: AMPUTATION, ABOVE KNEE;  Surgeon: Teddy Huber MD;  [facility];  Service: Vascular;  Laterality: Left;    ABOVE-KNEE AMPUTATION Right 3/18/2022    Procedure: AMPUTATION, ABOVE KNEE;  Surgeon: DAYNE Florez II, MD;  [facility];  Service: Vascular;  Laterality: Right;    Angiogram - Right Extremity Right 7/9/15    angiogram-left leg  10/6/15    ANGIOGRAPHY OF LOWER EXTREMITY Left 4/29/2021    Procedure: ANGIOGRAM, LOWER EXTREMITY;  Surgeon: Teddy Huber MD;  Location: [facility] OR [address] [place] [address];  Service: Vascular;  Laterality: Left;    BELOW KNEE AMPUTATION OF LOWER EXTREMITY Right 12/28/2021    Procedure: AMPUTATION, BELOW KNEE;  Surgeon: Kaitlyn Rojas MD;  Location: [facility] OR;  Service: General;  Laterality: Right;    CATHETERIZATION OF BOTH LEFT AND RIGHT HEART N/A 12/18/2019    Procedure: CATHETERIZATION, HEART, BOTH LEFT AND RIGHT;  Surgeon: Que Fernando III, MD;  Location: [facility] CATH LAB;  Service: Cardiology;  Laterality: N/A;    CORONARY ANGIOGRAPHY N/A 12/18/2019    Procedure: ANGIOGRAM, CORONARY ARTERY;  Surgeon: Que Fernando III, MD;  Location: [facility] CATH LAB;  Service: Cardiology;  Laterality: N/A;    CORONARY ANGIOGRAPHY INCLUDING BYPASS GRAFTS WITH CATHETERIZATION OF LEFT HEART N/A 7/28/2020    Procedure: ANGIOGRAM, CORONARY, INCLUDING BYPASS GRAFT, WITH LEFT HEART CATHETERIZATION, 9 am;  Surgeon: Rachel Easley MD;  Location: [facility] CATH LAB;  Service: Cardiology;   Laterality: N/A;    CORONARY ARTERY BYPASS GRAFT (CABG) N/A 1/14/2020    Procedure: CORONARY ARTERY BYPASS GRAFT (CABG) x 1     Off Pump;  Surgeon: Huang Altamirano MD;  [facility];  Service: Cardiovascular;  Laterality: N/A;    CREATION OF FEMORAL-TIBIAL ARTERY BYPASS Left 4/29/2021    Procedure: CREATION, BYPASS, ARTERIAL, FEMORAL TO ANTERIOR TIBIAL;  Surgeon: Teddy Huber MD;  [facility];  Service: Vascular;  Laterality: Left;    CREATION OF FEMOROPOPLITEAL ARTERIAL BYPASS USING GRAFT Left 8/18/2020    Procedure: CREATION, BYPASS, ARTERIAL, FEMORAL TO POPLITEAL, USING GRAFT, LEFT LOWER EXTREMITY;  Surgeon: Teddy Huber MD;  Location: [facility] OR;  Service: Vascular;  Laterality: Left;  REQUEST 7:15 A.M. START----COVID NEGATIVE ON 8/17 1ST CASE STARTE PER LEANA ON 8/7/2020 @ 942AM-  RN PREOP 8/12/2020   T/S-----CLEARED BY CARDS-------PENDING INSURANCE    DEBRIDEMENT OF FOOT Left 9/8/2020    Procedure: DEBRIDEMENT, FOOT;  Surgeon: Rosio Mayes DPM;  Location: [facility] OR;  Service: Podiatry;  Laterality: Left;  request neoxx .   RN Pre Op 9-4-2020, Covid negative on 9/5/20. C A    DEBRIDEMENT OF FOOT  3/4/2021    Procedure: DEBRIDEMENT, FOOT;  Surgeon: Teddy Huber MD;  Location: [facility] OR;  Service: Vascular;;    DEBRIDEMENT OF FOOT Left 3/9/2021    Procedure: DEBRIDEMENT, FOOT, bone biopsy;  Surgeon: Rosio Mayes DPM;  Location: [facility] OR;  Service: Podiatry;  Laterality: Left;  Request neoxx---COVID IN AM  REQUESTING NOON START  RN Phone Pre op.On Blood thinners Plavix and Eliquis.  Covid am of surgery. C A    DEBRIDEMENT OF FOOT Left 5/4/2021    Procedure: DEBRIDEMENT, FOOT;  Surgeon: Farooq Morley DPM;  [facility];  Service: Podiatry;  Laterality: Left;    INSERTION OF TUNNELED CENTRAL VENOUS HEMODIALYSIS CATHETER N/A 1/27/2020    Procedure: Insertion, Catheter, Central Venous, Hemodialysis;  Surgeon: ESTEBAN Gomez III, MD;   Location: [facility] CATH LAB;  Service: Peripheral Vascular;  Laterality: N/A;    PERCUTANEOUS TRANSLUMINAL ANGIOPLASTY N/A 3/4/2021    Procedure: PTA (ANGIOPLASTY, PERCUTANEOUS, TRANSLUMINAL);  Surgeon: Teddy Huber MD;  Location: [facility] OR;  Service: Vascular;  Laterality: N/A;    REMOVAL OF ARTERIOVENOUS GRAFT Left 5/27/2021    Procedure: REMOVAL, GRAFT, LEFT LOWER EXTREMITY, WOUND EXPLORATION;  Surgeon: Teddy Huber MD;  Location: [facility] OR 2ND FLR;  Service: Vascular;  Laterality: Left;    REMOVAL OF NAIL OF DIGIT Left 3/9/2021    Procedure: REMOVAL, NAIL, DIGIT;  Surgeon: Rosio Mayes DPM;  Location: [facility] OR;  Service: Podiatry;  Laterality: Left;    THROMBECTOMY Left 3/4/2021    Procedure: THROMBECTOMY, LEFT LOWER EXTREMITY BYPASS GRAFT, ANGIOGRAM, POSSIBLE INTERVENTION, POSSIBLE LEFT LOWER EXTREMITY BYPASS;  Surgeon: Teddy Huber MD;  Location: [facility] OR;  Service: Vascular;  Laterality: Left;    THROMBECTOMY Left 4/29/2021    Procedure: GRAFT THROMBECTOMY, LEFT LOWER EXTREMITY;  Surgeon: Teddy Huber MD;  Location: [facility] OR 2ND FLR;  Service: Vascular;  Laterality: Left;  14.5 min  1179.85 mGy  341.01 Gycm2  240 ml dye    THROMBECTOMY  10/22/2021    Procedure: THROMBECTOMY;  Surgeon: Saad Arenas MD;  Location: [facility] CATH LAB/EP;  Service: Cardiology;;     Family History   Problem Relation Age of Onset    Diabetes Mother     Diabetes Father     Heart disease Maternal Grandmother     No Known Problems Maternal Grandfather     Diabetes Paternal Grandmother     No Known Problems Paternal Grandfather     Anesthesia problems Neg Hx      Social History     Tobacco Use    Smoking status: Former Smoker    Smokeless tobacco: Never Used   Substance Use Topics    Alcohol use: No    Drug use: Yes     Types: Marijuana     Comment: occassional       Review of patient's allergies indicates:   Allergen Reactions    Ciprofloxacin Itching    Contrast media      Kidney injury     Iodine      Kidney injury       Outpatient meds:  No current facility-administered medications on file prior to encounter.     Current Outpatient Medications on File Prior to Encounter   Medication Sig Dispense Refill    acetaminophen (TYLENOL) 500 MG tablet Take 2 tablets (1,000 mg total) by mouth every 8 (eight) hours as needed for Pain.  0    albuterol (PROVENTIL) 2.5 mg /3 mL (0.083 %) nebulizer solution INHALE 3MLS EVERY 4 HOURS AS NEEDED FOR SHORTNESS OF BREATH FOR UP TO 30 DAYS 180 mL 0    allopurinoL (ZYLOPRIM) 100 MG tablet Take 1 tablet (100 mg total) by mouth once daily. 30 tablet 5    amitriptyline (ELAVIL) 10 MG tablet Take 1 tablet (10 mg total) by mouth nightly as needed for Pain. 30 tablet 12    aspirin (ECOTRIN) 81 MG EC tablet Take 81 mg by mouth once daily at 6am.      atorvastatin (LIPITOR) 80 MG tablet TAKE 1 TABLET(80 MG) BY MOUTH EVERY NIGHT 90 tablet 3    blood-glucose meter,continuous (DEXCOM G6 ) Misc Use to check blood sugars 4 times/day 1 each 0    blood-glucose sensor (DEXCOM G6 SENSOR) Radha Apply one sensor to skin every 10 days 3 each 11    blood-glucose transmitter (DEXCOM G6 TRANSMITTER) Radha Replace transmitter every 3 months to use with dexcom sensor 1 each 3    carvediloL (COREG) 3.125 MG tablet TAKE 1 TABLET(3.125 MG) BY MOUTH TWICE DAILY WITH BREAKFAST AND DINNER 180 tablet 0    clopidogreL (PLAVIX) 75 mg tablet TAKE 1 TABLET(75 MG) BY MOUTH DAILY 90 tablet 3    furosemide (LASIX) 40 MG tablet Take 1 tablet (40 mg total) by mouth once daily. 30 tablet 0    insulin detemir U-100 (LEVEMIR FLEXTOUCH) 100 unit/mL (3 mL) SubQ InPn pen Inject 12 Units into the skin every evening. 20 mL 0    insulin glargine (LANTUS) 100 unit/mL injection Inject 8 Units into the skin every evening.      magnesium oxide (MAG-OX) 400 mg (241.3 mg magnesium) tablet Take 1 tablet (400 mg total) by mouth 2 (two) times daily.  0    melatonin (MELATIN) 3 mg tablet Take 2 tablets (6 mg  total) by mouth nightly as needed for Insomnia.  0    multivit/folic acid/vit K1 (ONE-A-DAY WOMEN'S 50 PLUS ORAL) Take 1 tablet by mouth Daily.      NIFEdipine (PROCARDIA-XL) 30 MG (OSM) 24 hr tablet Take 1 tablet (30 mg total) by mouth once daily. 30 tablet 2    NOVOLOG FLEXPEN U-100 INSULIN 100 unit/mL (3 mL) InPn pen INJECT 5 UNITS INTO SKIN THREE TIMES DAILY WITH MEALS PLUS SLIDING SCALE FOR MAX DAILY DOSE OF 25 UNITS, HOLD IF NOT EATING (Patient taking differently: 8 Units. INJECT 8 UNITS INTO SKIN THREE TIMES DAILY WITH MEALS PLUS SLIDING SCALE FOR MAX DAILY DOSE OF 25 UNITS, HOLD IF NOT EATING) 15 mL 0    ondansetron (ZOFRAN-ODT) 8 MG TbDL Dissolve 1 tablet (8 mg total) by mouth every 8 (eight) hours as needed. 30 tablet 0    oxyCODONE (ROXICODONE) 5 MG immediate release tablet Take 1 tablet (5 mg total) by mouth every 6 (six) hours as needed for Pain. 28 tablet 0    pantoprazole (PROTONIX) 40 MG tablet TAKE 1 TABLET(40 MG) BY MOUTH DAILY 90 tablet 0    pregabalin (LYRICA) 75 MG capsule Take 1 capsule (75 mg total) by mouth 3 (three) times daily. 90 capsule 6    rivaroxaban (XARELTO) 10 mg Tab Take 2.5 mg by mouth nightly.      sertraline (ZOLOFT) 50 MG tablet TAKE 1 TABLET(50 MG) BY MOUTH DAILY 30 tablet 11    sodium bicarbonate 650 MG tablet Take 1 tablet (650 mg total) by mouth 2 (two) times daily. Please hold until follow up with Primary Care Provider 60 tablet 11    [DISCONTINUED] lancing device Misc 1 Device by Misc.(Non-Drug; Combo Route) route 2 (two) times daily with meals. 1 each 0       Scheduled meds:   allopurinoL  100 mg Oral Daily    aspirin  81 mg Oral Daily    atorvastatin  80 mg Oral QHS    carvediloL  3.125 mg Oral BID    clopidogreL  75 mg Oral Daily    insulin detemir U-100  16 Units Subcutaneous QHS    magnesium oxide  400 mg Oral BID    meropenem (MERREM) IVPB  500 mg Intravenous Q12H    NIFEdipine  30 mg Oral Daily    pantoprazole  40 mg Oral Daily    polyethylene  glycol  17 g Oral Daily    pregabalin  75 mg Oral TID    rivaroxaban  2.5 mg Oral Nightly    sertraline  50 mg Oral Daily       Infusions:   sodium chloride 0.9% 100 mL/hr at 08/17/22 0531       PRN meds:  acetaminophen, acetaminophen, aluminum-magnesium hydroxide-simethicone, amitriptyline, dextrose 10%, dextrose 10%, glucose, glucose, insulin aspart U-100, magnesium oxide, magnesium oxide, melatonin, naloxone, ondansetron, oxyCODONE, potassium bicarbonate, potassium bicarbonate, potassium bicarbonate, potassium, sodium phosphates, potassium, sodium phosphates, potassium, sodium phosphates, simethicone, sodium chloride 0.9%    Review of Systems:    OBJECTIVE:     Vital Signs and IO (Last 24H):  Temp:  [98 °F (36.7 °C)-98.8 °F (37.1 °C)]   Pulse:  [80-97]   Resp:  [15-20]   BP: (104-127)/(55-73)   SpO2:  [97 %-100 %]   I/O last 3 completed shifts:  In: 590 [P.O.:550; I.V.:40]  Out: 600 [Urine:600]    Wt Readings from Last 5 Encounters:   08/13/22 71.8 kg (158 lb 4.6 oz)   08/11/22 68.1 kg (150 lb 2.1 oz)   03/18/22 65.3 kg (144 lb)   03/12/22 65.3 kg (144 lb)   02/01/22 67.6 kg (149 lb 0.5 oz)     Physical Exam:  Physical Exam  Constitutional:       Appearance: She is well-developed. She is not diaphoretic.   HENT:      Head: Normocephalic and atraumatic.   Eyes:      General: No scleral icterus.     Pupils: Pupils are equal, round, and reactive to light.   Cardiovascular:      Rate and Rhythm: Normal rate and regular rhythm.   Pulmonary:      Effort: Pulmonary effort is normal. No respiratory distress.      Breath sounds: No stridor.   Abdominal:      General: There is no distension.      Palpations: Abdomen is soft.   Musculoskeletal:         General: No deformity. Normal range of motion.      Cervical back: Neck supple.   Skin:     General: Skin is warm and dry.      Findings: No erythema or rash.   Neurological:      Mental Status: She is alert and oriented to person, place, and time.      Cranial Nerves: No  cranial nerve deficit.   Psychiatric:         Behavior: Behavior normal.         Body mass index is 26.34 kg/m².    Laboratory:  Recent Labs   Lab 08/14/22  0617 08/15/22  0402 08/16/22  0413   * 132* 134*   K 4.3 4.1 4.5    109 112*   CO2 14* 14* 14*   BUN 41* 41* 41*   CREATININE 3.1* 2.7* 2.5*   * 167* 119*       Recent Labs   Lab 08/14/22  0617 08/15/22  0402 08/16/22  0413   CALCIUM 7.9* 7.6* 8.1*   ALBUMIN 2.0* 1.9* 1.7*   PHOS 4.0 3.8 4.6*   MG 1.7 1.6 1.6       Recent Labs   Lab 07/22/20  0453   PTH, Intact 77.8 H       Recent Labs   Lab 08/14/22  1542 08/15/22  0723 08/15/22  1125 08/15/22  1646 08/15/22  2036 08/16/22  0723 08/16/22  1137 08/16/22  1638 08/16/22  2138 08/17/22  0756   POCTGLUCOSE 322* 347* 276* 173* 207* 78 213* 174* 268* 267*       Recent Labs   Lab 12/03/21  1730 03/18/22  1944 03/26/22  1133   Hemoglobin A1C 9.6 H 7.0 H 6.9 H       Recent Labs   Lab 08/14/22  0617 08/15/22  0402 08/16/22  0413   WBC 11.18 8.80 7.35   HGB 10.3* 8.8* 8.4*   HCT 31.7* 26.2* 25.5*    237 257   MCV 91 90 92   MCHC 32.5 33.6 32.9   MONO 7.2  0.8 10.8  1.0 12.5  0.9       Recent Labs   Lab 08/14/22  0617 08/15/22  0402 08/16/22  0413   BILITOT 0.2 0.3 0.2   PROT 6.1 5.5* 5.4*   ALBUMIN 2.0* 1.9* 1.7*   ALKPHOS 110 99 92   ALT 8* 8* 9*   AST 11 10 10       Recent Labs   Lab 01/03/22  1724 01/31/22  1751 03/26/22  0749 08/10/22  1909 08/13/22  1855   Color, UA Yellow   < > Yellow Yellow Yellow   Appearance, UA Cloudy A   < > Cloudy A Clear Cloudy A   pH, UA 6.0   < > 6.0 6.0 6.0   Specific [place], UA 1.020   < > 1.010 1.010 1.025   Protein, UA 3+ A   < > 2+ A 2+ A 2+ A   Glucose, UA Trace A   < > Negative 4+ A Negative   Ketones, UA Negative   < > Negative Negative Negative   Urobilinogen, UA Negative  --   --  Negative Negative   Bilirubin (UA) Negative   < > Negative Negative 1+ A   Occult Blood UA 2+ A   < > 1+ A 1+ A 3+ A   Nitrite, UA Negative   < > Negative Negative Negative    RBC, UA 12 H   < > 45 H 1 12 H   WBC, UA >100 H   < > >100 H 2 >100 H   Bacteria Moderate A   < > None Occasional Many A   Hyaline Casts, UA 0   < > 0 7 A 0    < > = values in this interval not displayed.       Recent Labs   Lab 05/15/21  1310 07/04/21  1457 03/19/22  0211 08/10/22  2108 08/16/22  1052   POC PH 7.308 L  --   --  7.357 7.222 L   POC PCO2 37.4  --   --  35.3 35.9   POC HCO3 18.7 L  --   --  19.8 L 14.7 L   POC PO2 45  --   --  39 LL 37 L   POC SATURATED O2 77 L  --   --  72 L 60 L   POC BE -8  --   --  -6 -13   Sample VENOUS   < > VENOUS CAPILLARY VENOUS    < > = values in this interval not displayed.       Microbiology Results (last 7 days)     Procedure Component Value Units Date/Time    Urine Culture High Risk [[id number]] Collected: 08/15/22 1650    Order Status: Completed Specimen: Urine Updated: 08/16/22 2221     Urine Culture, Routine No growth    Narrative:      Indicated criteria for high risk culture:->Other  Other (specify):->severe sepsis    Urine culture [[id number]] Collected: 08/13/22 1855    Order Status: Completed Specimen: Urine Updated: 08/15/22 0115     Urine Culture, Routine No growth    Narrative:      Specimen Source->Urine        ASSESSMENT/PLAN:     ROSLYN  Acidosis due to ROSLYN and uncontrolled DM2  Hyponatremia due to ROSLYN and uncontrolled DM2  Hyperglycemia with poorly controlled DM2  CKD stage 3 with diabetic proteinuria, baseline sCr around 1, probably < 1  No NSAIDs, ACEI/ARB, IV contrast or other nephrotoxins.  Keep MAP > 60, SBP > 100.  Dose meds for GFR < 30 ml/min.  Renal diet - low K, low phos.  Treat hyperglycemia.    - VBG confirm acidosis with inadequate compensation. Will add oral bicarb  and monitor.  - If acidosis persists beyond AK and hyperglycemia, would consider further work-up as outpatient.  - Renal US confirms  patency.    Anemia of CKD  Hgb and HCT are acceptable. Monitor.  Will provide NAEEM and/or IV iron PRN.    HTN  BP seem controlled.   Tolerate  asymptomatic HTN up to -160.  Continue home meds.  Low sodium diet.    Thank you for allowing us to participate in the care of your patient!   We will follow the patient and provide recommendations as needed.    Patient care time was spent personally by me on the following activities:   · Obtaining a history.  · Examination of patient.  · Providing medical care at the patients bedside.  · Developing a treatment plan with patient or surrogate and bedside caregivers.  · Ordering and reviewing laboratory studies, radiographic studies, pulse oximetry.  · Ordering and performing treatments and interventions.  · Evaluation of patient's response to treatment.  · Discussions with consultants while on the unit and immediately available to the patient.  · Re-evaluation of the patient's condition.  · Documentation in the medical record.     Ricky Oliveira MD    [facility]  [address] [place] [address] [place] [address]    [phone] - tel  [phone] - fax    8/17/2022

## 2022-08-17 NOTE — PLAN OF CARE
POC/Meds reviewed, pt verbalized understanding. Vitals stable.  Afebrile.  Tele In place-[id number].  blood glucose monitored. Repositions self. Hourly/Q2hr rounding performed, safety maintained. Bed in lowest position, wheels locked, SR up x2, call light in easy reach. No  complaints at this time.

## 2022-08-18 VITALS
TEMPERATURE: 98 F | BODY MASS INDEX: 26.38 KG/M2 | RESPIRATION RATE: 18 BRPM | HEIGHT: 65 IN | HEART RATE: 87 BPM | DIASTOLIC BLOOD PRESSURE: 79 MMHG | WEIGHT: 158.31 LBS | SYSTOLIC BLOOD PRESSURE: 163 MMHG | OXYGEN SATURATION: 100 %

## 2022-08-18 LAB
A-LINOLENATE SERPL-SCNC: 66 NMOL/ML (ref 50–130)
AA SERPL-SCNC: 1432 NMOL/ML (ref 520–1490)
ALBUMIN SERPL BCP-MCNC: 1.8 G/DL (ref 3.5–5.2)
ALP SERPL-CCNC: 103 U/L (ref 55–135)
ALT SERPL W/O P-5'-P-CCNC: 9 U/L (ref 10–44)
ANION GAP SERPL CALC-SCNC: 8 MMOL/L (ref 8–16)
ARACHIDATE SERPL-SCNC: 30 NMOL/ML (ref 50–90)
AST SERPL-CCNC: 13 U/L (ref 10–40)
BASOPHILS # BLD AUTO: 0.04 K/UL (ref 0–0.2)
BASOPHILS NFR BLD: 0.6 % (ref 0–1.9)
BILIRUB SERPL-MCNC: 0.2 MG/DL (ref 0.1–1)
BUN SERPL-MCNC: 44 MG/DL (ref 6–20)
CALCIUM SERPL-MCNC: 8.3 MG/DL (ref 8.7–10.5)
CHLORIDE SERPL-SCNC: 112 MMOL/L (ref 95–110)
CLINICAL BIOCHEMIST REVIEW: ABNORMAL
CO2 SERPL-SCNC: 16 MMOL/L (ref 23–29)
CREAT SERPL-MCNC: 2 MG/DL (ref 0.5–1.4)
DECANOATE SERPL-SCNC: 4 NMOL/ML (ref 2–18)
DECENOATE SERPL-SCNC: 2.2 NMOL/ML (ref 1.8–5)
DHA SERPL-SCNC: 115 NMOL/ML (ref 30–250)
DIFFERENTIAL METHOD: ABNORMAL
DOCOSAPENTAENATE W6 SERPL-SCNC: 28 NMOL/ML (ref 10–70)
DOCOSATETRAENOATE SERPL-SCNC: 44 NMOL/ML (ref 10–80)
DOCOSENOATE SERPL-SCNC: 5 NMOL/ML (ref 4–13)
DOSOSENOIC ACID, C22:0: 46.8 NMOL/ML (ref 0–96.3)
DPA SERPL-SCNC: 49 NMOL/ML (ref 20–210)
EOSINOPHIL # BLD AUTO: 0.2 K/UL (ref 0–0.5)
EOSINOPHIL NFR BLD: 3.3 % (ref 0–8)
EPA SERPL-SCNC: 35 NMOL/ML (ref 14–100)
ERYTHROCYTE [DISTWIDTH] IN BLOOD BY AUTOMATED COUNT: 14.7 % (ref 11.5–14.5)
EST. GFR  (NO RACE VARIABLE): 29 ML/MIN/1.73 M^2
FA SERPL-SCNC: 11 MMOL/L (ref 7.3–16.8)
G-LINOLENATE SERPL-SCNC: 55 NMOL/ML (ref 16–150)
GLUCOSE SERPL-MCNC: 81 MG/DL (ref 70–110)
HCT VFR BLD AUTO: 26.1 % (ref 37–48.5)
HEXADECADIENOATE SERPL-SCNC: 11 NMOL/ML (ref 10–48)
HEXADECENOATE SERPL-SCNC: 39 NMOL/ML (ref 25–105)
HGB BLD-MCNC: 8.3 G/DL (ref 12–16)
HOMO-G LINOLENATE SERPL-SCNC: 77 NMOL/ML (ref 50–250)
IMM GRANULOCYTES # BLD AUTO: 0.06 K/UL (ref 0–0.04)
IMM GRANULOCYTES NFR BLD AUTO: 0.9 % (ref 0–0.5)
LAURATE SERPL-SCNC: 12 NMOL/ML (ref 6–90)
LAUROLEATE SERPL-SCNC: 1.7 NMOL/ML (ref 1.4–6.6)
LINOLEATE SERPL-SCNC: 2915 NMOL/ML (ref 2270–3850)
LYMPHOCYTES # BLD AUTO: 1.5 K/UL (ref 1–4.8)
LYMPHOCYTES NFR BLD: 24.2 % (ref 18–48)
MAGNESIUM SERPL-MCNC: 1.8 MG/DL (ref 1.6–2.6)
MCH RBC QN AUTO: 29.3 PG (ref 27–31)
MCHC RBC AUTO-ENTMCNC: 31.8 G/DL (ref 32–36)
MCV RBC AUTO: 92 FL (ref 82–98)
MEAD ACID SERPL-SCNC: 31 NMOL/ML (ref 7–30)
MONOCYTES # BLD AUTO: 1 K/UL (ref 0.3–1)
MONOCYTES NFR BLD: 15.9 % (ref 4–15)
MONOUNSAT FA SERPL-SCNC: 2.7 MMOL/L (ref 1.3–5.8)
MYRISTATE SERPL-SCNC: 105 NMOL/ML (ref 30–450)
MYRISTOLEATE SERPL-SCNC: 6 NMOL/ML (ref 3–64)
NERVONATE SERPL-SCNC: 95 NMOL/ML (ref 60–100)
NEUTROPHILS # BLD AUTO: 3.5 K/UL (ref 1.8–7.7)
NEUTROPHILS NFR BLD: 55.1 % (ref 38–73)
NRBC BLD-RTO: 0 /100 WBC
OCTADECANOATE SERPL-SCNC: 879 NMOL/ML (ref 590–1170)
OCTANOATE SERPL-SCNC: 8 NMOL/ML (ref 8–47)
OLEATE SERPL-SCNC: 2215 NMOL/ML (ref 650–3500)
PALMITATE SERPL-SCNC: 2292 NMOL/ML (ref 1480–3730)
PALMITOLEATE SERPL-SCNC: 132 NMOL/ML (ref 110–1130)
PHOSPHATE SERPL-MCNC: 3.8 MG/DL (ref 2.7–4.5)
PHYTANATE SERPL-SCNC: 0.59 NMOL/ML (ref 0–9.88)
PLATELET # BLD AUTO: 270 K/UL (ref 150–450)
PMV BLD AUTO: 10.2 FL (ref 9.2–12.9)
POCT GLUCOSE: 162 MG/DL (ref 70–110)
POCT GLUCOSE: 93 MG/DL (ref 70–110)
POLYUNSAT FA SERPL-SCNC: 4.8 MMOL/L (ref 3.2–5.8)
POTASSIUM SERPL-SCNC: 4.7 MMOL/L (ref 3.5–5.1)
PRISTANATE SERPL-SCNC: 0.06 NMOL/ML (ref 0–2.98)
PROT SERPL-MCNC: 5.7 G/DL (ref 6–8.4)
RBC # BLD AUTO: 2.83 M/UL (ref 4–5.4)
SAT FA SERPL-SCNC: 3.4 MMOL/L (ref 2.5–5.5)
SODIUM SERPL-SCNC: 136 MMOL/L (ref 136–145)
TDECADIENOATE SERPL-SCNC: 1 NMOL/ML (ref 0.8–5)
TRIENOATE/AA SERPL-SRTO: 0.02 {RATIO} (ref 0.01–0.04)
VACCENATE SERPL-SCNC: 195 NMOL/ML (ref 280–740)
VIT B1 BLD-MCNC: 44 UG/L (ref 38–122)
VLCFA C24:0 SERPL-SCNC: 36.7 NMOL/ML (ref 0–91.4)
VLCFA C26:0 SERPL-SCNC: 0.45 NMOL/ML (ref 0–1.3)
VLCFA C26:1 SERPL-SCNC: 0.4 NMOL/ML (ref 0.3–0.7)
W3 FA SERPL-SCNC: 0.3 MMOL/L (ref 0.2–0.5)
W6 FA SERPL-SCNC: 4.6 MMOL/L (ref 3–5.4)
WBC # BLD AUTO: 6.37 K/UL (ref 3.9–12.7)

## 2022-08-18 PROCEDURE — 80053 COMPREHEN METABOLIC PANEL: CPT | Performed by: INTERNAL MEDICINE

## 2022-08-18 PROCEDURE — 36415 COLL VENOUS BLD VENIPUNCTURE: CPT | Performed by: INTERNAL MEDICINE

## 2022-08-18 PROCEDURE — 63600175 PHARM REV CODE 636 W HCPCS: Performed by: NURSE PRACTITIONER

## 2022-08-18 PROCEDURE — 97530 THERAPEUTIC ACTIVITIES: CPT | Mod: CQ

## 2022-08-18 PROCEDURE — 83735 ASSAY OF MAGNESIUM: CPT | Performed by: INTERNAL MEDICINE

## 2022-08-18 PROCEDURE — 84100 ASSAY OF PHOSPHORUS: CPT | Performed by: INTERNAL MEDICINE

## 2022-08-18 PROCEDURE — 25000003 PHARM REV CODE 250: Performed by: NURSE PRACTITIONER

## 2022-08-18 PROCEDURE — 25000003 PHARM REV CODE 250: Performed by: INTERNAL MEDICINE

## 2022-08-18 PROCEDURE — 85025 COMPLETE CBC W/AUTO DIFF WBC: CPT | Performed by: INTERNAL MEDICINE

## 2022-08-18 RX ORDER — SODIUM BICARBONATE 650 MG/1
1300 TABLET ORAL 3 TIMES DAILY
Qty: 180 TABLET | Refills: 0 | Status: ON HOLD | OUTPATIENT
Start: 2022-08-18 | End: 2023-05-02 | Stop reason: CLARIF

## 2022-08-18 RX ORDER — FUROSEMIDE 40 MG/1
40 TABLET ORAL DAILY
Status: DISCONTINUED | OUTPATIENT
Start: 2022-08-18 | End: 2022-08-18 | Stop reason: HOSPADM

## 2022-08-18 RX ADMIN — SODIUM BICARBONATE 650 MG TABLET 1300 MG: at 09:08

## 2022-08-18 RX ADMIN — MEROPENEM AND SODIUM CHLORIDE 500 MG: 500 INJECTION, SOLUTION INTRAVENOUS at 05:08

## 2022-08-18 RX ADMIN — INSULIN ASPART 6 UNITS: 100 INJECTION, SOLUTION INTRAVENOUS; SUBCUTANEOUS at 04:08

## 2022-08-18 RX ADMIN — PREGABALIN 75 MG: 75 CAPSULE ORAL at 02:08

## 2022-08-18 RX ADMIN — ASPIRIN 81 MG: 81 TABLET, COATED ORAL at 05:08

## 2022-08-18 RX ADMIN — CLOPIDOGREL BISULFATE 75 MG: 75 TABLET, FILM COATED ORAL at 09:08

## 2022-08-18 RX ADMIN — SERTRALINE HYDROCHLORIDE 50 MG: 50 TABLET ORAL at 09:08

## 2022-08-18 RX ADMIN — PREGABALIN 75 MG: 75 CAPSULE ORAL at 09:08

## 2022-08-18 RX ADMIN — ALLOPURINOL 100 MG: 100 TABLET ORAL at 09:08

## 2022-08-18 RX ADMIN — MEROPENEM AND SODIUM CHLORIDE 500 MG: 500 INJECTION, SOLUTION INTRAVENOUS at 02:08

## 2022-08-18 RX ADMIN — PANTOPRAZOLE SODIUM 40 MG: 40 TABLET, DELAYED RELEASE ORAL at 09:08

## 2022-08-18 RX ADMIN — Medication 400 MG: at 09:08

## 2022-08-18 RX ADMIN — NIFEDIPINE 30 MG: 30 TABLET, FILM COATED, EXTENDED RELEASE ORAL at 09:08

## 2022-08-18 RX ADMIN — CARVEDILOL 3.12 MG: 3.12 TABLET, FILM COATED ORAL at 09:08

## 2022-08-18 RX ADMIN — SODIUM CHLORIDE: 0.9 INJECTION, SOLUTION INTRAVENOUS at 05:08

## 2022-08-18 RX ADMIN — FUROSEMIDE 40 MG: 40 TABLET ORAL at 02:08

## 2022-08-18 RX ADMIN — SODIUM BICARBONATE 650 MG TABLET 1300 MG: at 02:08

## 2022-08-18 NOTE — PLAN OF CARE
[facility] in [place] per family request. Updated AVS    Apt scheduled with neuro. Updated AVS

## 2022-08-18 NOTE — DISCHARGE INSTRUCTIONS
Wound care  Clean perineal area with soap and water and dry. Apply Triad hydrophilic wound dressing to the area twice a day until healed.

## 2022-08-18 NOTE — PROGRESS NOTES
[facility] Hospital Medicine  Progress Note    Patient Name: Suyapa Connelly  MRN: [id number]  Patient Class: IP- Inpatient   Admission Date: 8/13/2022  Length of Stay: 5 days  Attending Physician: Caty Otero MD  Primary Care Provider: Magen Christensen MD        Subjective:     Principal Problem:Severe sepsis        HPI:   Suyapa Connelly is a 56 year old female with a past medical history of HTN, DM, CKD, CAD and past surgical history of bilateral AKA due to PAD and CABG, who presented to the ED with confusion and unresponsiveness. She was admitted three days ago for confusion with a CT notable for hydrocephalus, but her  signed her out AMA as he felt she had improved enough to take her home and no longer wanted her to be in the hospital, despite her persistent confusion. She was responsive and communicating more than she was on initial admit, so he said he felt he could care for her better at home. They went to [facility] today, where she started drooling and became confused and unresponsive. She denied any complaints prior. She denies complaint at this time.     Upon arrival to the ED, she was minimally responsive. Staff performed a sternal rub, which woke her up quickly. She was hypotensive and received IV boluses, which improved her blood pressure and mentation. She is currently awake, alert and oriented to self, place and situation. She is not correct with dates. Dr. Arthur with Neurology was consulted by the ED, and did not recommend emergent treatment. He did say there was a possibility of the patient having a lumbar puncture at some point tomorrow. Urinalysis was positive for infection. Her lactic acid level was within normal limits. She has an acute on chronic kidney injury. CBC shows chronic anemia. Hospital Medicine consulted for admission and further management.          Overview/Hospital Course:  No notes on file    Interval History: Afebrile. Mental status improved to  "baseline.  Patient answering questions appropriately.  Denies any focal subjective complaints.  No abdominal pain or urinary difficulties reported.  Patient denies any new focal subjective complaints.    Review of Systems   Constitutional:  Positive for fatigue. Negative for chills and fever.   HENT:  Negative for congestion and sore throat.    Eyes:  Negative for visual disturbance.   Respiratory:  Negative for cough and shortness of breath.    Cardiovascular:  Negative for chest pain and palpitations.   Gastrointestinal:  Negative for abdominal pain, nausea and vomiting.   Endocrine: Negative for cold intolerance and heat intolerance.   Genitourinary:  Negative for dysuria and hematuria.   Musculoskeletal:  Negative for arthralgias and myalgias.   Skin:  Negative for pallor and rash.   Neurological:  Negative for tremors and seizures.   Hematological:  Negative for adenopathy. Does not bruise/bleed easily.   Psychiatric/Behavioral:  Positive for confusion ("i felt like my brain was messed up, like i was there but i wasnt there") and decreased concentration. Negative for hallucinations.    All other systems reviewed and are negative.  Objective:     Vital Signs (Most Recent):  Temp: 97.3 °F (36.3 °C) (08/18/22 0745)  Pulse: 85 (08/18/22 0745)  Resp: 17 (08/18/22 0745)  BP: (!) 109/57 (08/18/22 0745)  SpO2: 100 % (08/18/22 0745)   Vital Signs (24h Range):  Temp:  [97.3 °F (36.3 °C)-99.3 °F (37.4 °C)] 97.3 °F (36.3 °C)  Pulse:  [84-91] 85  Resp:  [17-20] 17  SpO2:  [95 %-100 %] 100 %  BP: ()/(49-71) 109/57     Weight: 71.8 kg (158 lb 4.6 oz)  Body mass index is 26.34 kg/m².    Intake/Output Summary (Last 24 hours) at 8/18/2022 0840  Last data filed at 8/17/2022 1400  Gross per 24 hour   Intake 120 ml   Output --   Net 120 ml        Physical Exam  Vitals and nursing note reviewed.   Constitutional:       General: She is awake. She is not in acute distress.     Appearance: She is well-developed. She is obese. She " is ill-appearing (chronically ill).   HENT:      Head: Normocephalic and atraumatic.      Mouth/Throat:      Lips: Pink.      Mouth: Mucous membranes are moist.   Eyes:      Conjunctiva/sclera: Conjunctivae normal.      Pupils: Pupils are equal, round, and reactive to light.   Neck:      Thyroid: No thyromegaly.      Vascular: No JVD.   Cardiovascular:      Rate and Rhythm: Normal rate and regular rhythm.      Heart sounds: Normal heart sounds, S1 normal and S2 normal. No murmur heard.    No friction rub. No gallop.   Pulmonary:      Effort: Pulmonary effort is normal.      Breath sounds: Normal breath sounds.   Abdominal:      General: Abdomen is protuberant. Bowel sounds are normal. There is no distension.      Palpations: Abdomen is soft. There is no mass.      Tenderness: There is no abdominal tenderness.   Musculoskeletal:         General: Normal range of motion.      Cervical back: Full passive range of motion without pain, normal range of motion and neck supple.      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      Left Lower Extremity: Left leg is amputated above knee.   Skin:     General: Skin is warm and dry.      Capillary Refill: Capillary refill takes 2 to 3 seconds.   Neurological:      General: No focal deficit present.      Mental Status: She is alert and oriented to person, place, and time.      Cranial Nerves: No cranial nerve deficit.      Sensory: Sensation is intact.      Motor: Motor function is intact.      Comments: Oriented to person, place and situation, unable to tell birthdate  Converses well, no dysarthria and no aphasia  Moves all extremities well with equal strength   Psychiatric:         Behavior: Behavior normal. Behavior is cooperative.       Significant Labs: All pertinent labs within the past 24 hours have been reviewed.  CBC:   Recent Labs   Lab 08/17/22  0936 08/18/22  0528   WBC 5.86 6.37   HGB 8.7* 8.3*   HCT 26.3*  26.1*    270       CMP:   Recent Labs   Lab 08/17/22  0936 08/18/22  0528   * 136   K 4.8 4.7   * 112*   CO2 14* 16*   * 81   BUN 44* 44*   CREATININE 2.4* 2.0*   CALCIUM 7.9* 8.3*   PROT 5.6* 5.7*   ALBUMIN 1.8* 1.8*   BILITOT 0.2 0.2   ALKPHOS 103 103   AST 9* 13   ALT 6* 9*   ANIONGAP 9 8       TSH:   Recent Labs   Lab 08/13/22  2204   TSH 0.885       Urine Culture:   No results for input(s): LABURIN in the last 48 hours.    Urine Studies:   No results for input(s): COLORU, APPEARANCEUA, PHUR, SPECGRAV, PROTEINUA, GLUCUA, KETONESU, BILIRUBINUA, OCCULTUA, NITRITE, UROBILINOGEN, LEUKOCYTESUR, RBCUA, WBCUA, BACTERIA, SQUAMEPITHEL, HYALINECASTS in the last 48 hours.    Invalid input(s): [facility]    Microbiology Results (last 7 days)       Procedure Component Value Units Date/Time    Urine Culture High Risk [[id number]] Collected: 08/15/22 1650    Order Status: Completed Specimen: Urine Updated: 08/16/22 2221     Urine Culture, Routine No growth    Narrative:      Indicated criteria for high risk culture:->Other  Other (specify):->severe sepsis    Urine culture [[id number]] Collected: 08/13/22 1855    Order Status: Completed Specimen: Urine Updated: 08/15/22 0115     Urine Culture, Routine No growth    Narrative:      Specimen Source->Urine          Significant Imaging:   CT Head (08-10-22):  1. Suspected vasogenic edema in the bilateral parietooccipital lobes which can be seen with PRES.  Recommend clinical correlation and consider further evaluation with MRI.  2. Senescent changes and chronic microvascular ischemic changes.    MRI Brain:  The study is mildly motion degraded.  There is however no acute abnormality and little if any change compared to a prior brain MRI dated 06/02/2021.  The ventricles are mildly prominent but without definite change.  There is no midline shift.  There is at least moderate white matter change.  These findings however were present on the comparison MRI and  without any significant or definite change.  Findings do not strongly suggest PRES.  There is no hemorrhage, mass or acute infarction.    MRA Brain:  Mild irregularity of the left greater than right cavernous internal carotid arteries without significant atherosclerotic narrowing.  No other high-grade stenosis, large vessel occlusion or aneurysm.    MRA Neck:   Mild narrowing at bilateral carotid bifurcations with less than 50% stenosis.  Otherwise, unremarkable MRA of the neck vasculature.    CXR: There are linear regions of atelectasis and bilateral lower lung zones.    CT Head:  There is no evidence acute intracranial process.  There is cerebral atrophy with ventricular dilatation and periventricular white matter changes.  The degree of ventricular dilatation is similar to what was seen on MRI from 08/11/2022.    CXR: Left internal jugular central venous catheter placement.        Assessment/Plan:      * Severe sepsis  This patient does have evidence of infective focus  My overall impression is sepsis. Vital signs were reviewed and noted in progress note.  Antibiotics given-   Antibiotics (From admission, onward)            Start     Stop Route Frequency Ordered    08/14/22 0500  meropenem-0.9% sodium chloride 500 mg/50 mL IVPB         -- IV Every 12 hours (non-standard times) 08/14/22 0002        Cultures were taken-   Microbiology Results (last 7 days)     Procedure Component Value Units Date/Time    Urine culture [[id number]] Collected: 08/13/22 1855    Order Status: Completed Specimen: Urine Updated: 08/15/22 0115     Urine Culture, Routine No growth    Narrative:      Specimen Source->Urine        Latest lactate reviewed, they are-  Recent Labs   Lab 08/13/22  1747 08/13/22  2204   LACTATE 1.2 1.4       Organ dysfunction indicated by Acute kidney injury and Encephalopathy   Source- urine    Source control Achieved by- antibiotics      Metabolic acidosis  Patient appears to have acute kidney injury on chronic  kidney disease stage 3 with significant underlying metabolic acidosis.  Patient has never been evaluated by nephrologist.  Continue oral sodium bicarbonate supplementation.  Follow Nephrology recommendations.      Communicating hydrocephalus  Consult Neuro-Dr Arthur    No emergent intervention   May perform LP  Seen on CT of head:  IMPRESSION:  1. Moderate ventriculomegaly involving the lateral ventricles and 3rd ventricle demonstrating mild  progression since the oldest comparison 05/25/2021, disproportionate to the degree of peripheral  sulcal prominence and demonstrating increased apical sulcal effacement bilaterally. 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 Consider further assessment with LP and opening pressure, and neurologic or  neurosurgical consult. Nuclear medicine cisternography may be helpful in further assessment.  2. Gradually increasing periventricular white matter hypodensities are nonspecific and might  represent progression of microvascular ischemia in this age group, however in the setting of  hydronephrosis this raises concern for an element of transependymal fluid migration.  3. There are small chronic subependymal nodules in the anterior which are unchanged, favoring  benign lesions such as subependymal hamartomas. Correlate clinically for tuberous sclerosis.  4. Chronic densely calcified dural-based 14 mm lesion in the left middle cranial fossa is unchanged,  consistent with densely calcified meningioma or incidental dural ossification without significant mass  effect.  5. These findings initiated a results reporting process. An addendum will be issued at the time of  clinician notification.    Confusion  Transient, had episode of altered mental status that resolved with IV fluid hydration    Status post above-knee amputation of both lower extremities  Noted  Fall precautions      UTI (urinary tract  infection)  Follow urine culture  Meropenem from zosyn due to kidney injury and hx of ESBL and MDRO, historical culture showed sensitivity      Acute renal failure superimposed on stage 3 chronic kidney disease    Patient with acute kidney injury likely d/t Pre-renal azotemia. ROSLYN is currently stable. Labs reviewed- Renal function/electrolytes with Estimated Creatinine Clearance: 26.3 mL/min (A) (based on SCr of 2.4 mg/dL (H)). according to latest data. Monitor urine output and serial BMP and adjust therapy as needed. Avoid nephrotoxins and renally dose meds for GFR listed above.  Follow Dr. Oliveira recommendations.  Patient is currently on oral sodium bicarbonate supplementation.      Class 2 severe obesity due to excess calories with serious comorbidity in adult  Body mass index is 26.34 kg/m². Morbid obesity complicates all aspects of disease management from diagnostic modalities to treatment. Weight loss encouraged and health benefits explained to patient.         Impaired functional mobility and endurance  PT/OT consulted  Turn q2h  Assist with ADLs      Hyponatremia  Na on admit 132  Gentle IVF rehydration    Paroxysmal atrial fibrillation  Patient with Paroxysmal (<7 days) atrial fibrillation which is controlled currently with Beta Blocker and Calcium Channel Blocker. Patient is currently in sinus rhythm.[id number] Score: 3. HASBLED Score: Unable to calculate. Anticoagulation indicated. Anticoagulation done with xarelto.        Anemia  Patient's anemia is currently controlled. Has not received any PRBCs to date.. Etiology likely d/t chronic disease  Current CBC reviewed-   Lab Results   Component Value Date    HGB 8.8 (L) 08/15/2022    HCT 26.2 (L) 08/15/2022     Monitor serial CBC and transfuse if patient becomes hemodynamically unstable, symptomatic or H/H drops below 7/21.         CAD (coronary artery disease)    Patient with known CAD s/p CABG, which is controlled Will continue ASA, Plavix and Statin and  monitor for S/Sx of angina/ACS. Continue to monitor on telemetry.       Type 2 diabetes mellitus with hyperglycemia, with long-term current use of insulin  Patient's FSGs are controlled on current medication regimen.  Last A1c reviewed-   Lab Results   Component Value Date    HGBA1C 6.9 (H) 03/26/2022     Most recent fingerstick glucose reviewed-   Recent Labs   Lab 08/14/22  1007 08/14/22  1542 08/15/22  0723   POCTGLUCOSE 266* 322* 347*     Current correctional scale  Medium  Maintain anti-hyperglycemic dose as follows-   Antihyperglycemics (From admission, onward)            Start     Stop Route Frequency Ordered    08/14/22 2100  insulin detemir U-100 pen 16 Units         -- SubQ Nightly 08/14/22 1531    08/14/22 0111  insulin aspart U-100 pen 1-10 Units         -- SubQ Before meals & nightly PRN 08/14/22 0011        Hold Oral hypoglycemics while patient is in the hospital.    Mixed hyperlipidemia   Patient is chronically on statin.will continue for now. Monitor clinically. Last LDL was   Lab Results   Component Value Date    LDLCALC 177.6 (H) 12/03/2021            CAROLIN (generalized anxiety disorder)  Noted, chronic      Essential hypertension  Chronic, controlled.  Latest blood pressure and vitals reviewed-   Temp:  [97.8 °F (36.6 °C)-99.9 °F (37.7 °C)]   Pulse:  []   Resp:  [16-20]   BP: (100-130)/(56-80)   SpO2:  [97 %-100 %] .   Home meds for hypertension were reviewed and noted below.   Hypertension Medications             carvediloL (COREG) 3.125 MG tablet TAKE 1 TABLET(3.125 MG) BY MOUTH TWICE DAILY WITH BREAKFAST AND DINNER    furosemide (LASIX) 40 MG tablet Take 1 tablet (40 mg total) by mouth once daily.    NIFEdipine (PROCARDIA-XL) 30 MG (OSM) 24 hr tablet Take 1 tablet (30 mg total) by mouth once daily.          While in the hospital, will manage blood pressure as follows; Continue home antihypertensive regimen, hold losartan    Will utilize p.r.n. blood pressure medication only if patient's  blood pressure greater than  180/110 and she develops symptoms such as worsening chest pain or shortness of breath.        Moderate malnutrition  Nutrition consulted. Most recent weight and BMI monitored-                         Measurements:  Wt Readings from Last 1 Encounters:   08/13/22 71.8 kg (158 lb 4.6 oz)   Body mass index is 26.34 kg/m².    Recommendations:      Patient has been screened and assessed by RD. RD will follow patient.      Disposition: DC home once cleared by Nephrology team.  VTE Risk Mitigation (From admission, onward)         Ordered     IP VTE HIGH RISK PATIENT  Once         08/13/22 2128                Discharge Planning   DEVAN: 8/18/2022     Code Status: Full Code   Is the patient medically ready for discharge?:     Reason for patient still in hospital (select all that apply): Patient trending condition and Consult recommendations  Discharge Plan A: Home with family                  Caty Otero MD  Department of Hospital Medicine   [facility]

## 2022-08-18 NOTE — PT/OT/SLP PROGRESS
Physical Therapy Treatment    Patient Name:  Suyapa Connelly   MRN:  [id number]    Recommendations:     Discharge Recommendations:  home health PT   Discharge Equipment Recommendations: none   Barriers to discharge: None    Assessment:     Suyapa Connelly is a 56 y.o. female admitted with a medical diagnosis of Severe sepsis.  She presents with the following impairments/functional limitations:  weakness, impaired endurance, impaired self care skills, impaired functional mobility, pain . Awake, alert , supine in bed.  Agreed to mobilize. Transferred bed > w/c via posterior scoot and CGA. Propelled w/c ~ 200' with SBA with several rests due to B shoulder discomfort.    Rehab Prognosis: Good; patient would benefit from acute skilled PT services to address these deficits and reach maximum level of function.    Recent Surgery: * No surgery found *      Plan:     During this hospitalization, patient to be seen 6 x/week to address the identified rehab impairments via therapeutic activities, therapeutic exercises, wheelchair management/training and progress toward the following goals:    · Plan of Care Expires:  08/30/22    Subjective     Chief Complaint: B shoulder discomfort.  Patient/Family Comments/goals: to return home with spouse  Pain/Comfort:  · Pain Rating 1: other (see comments) (did not rate)  · Location - Side 1: Bilateral  · Location - Orientation 1: generalized  · Location 1: shoulder  · Pain Addressed 1: Reposition, Cessation of Activity, Nurse notified      Objective:     Communicated with nurse Dominguez prior to session.  Patient found supine with bed alarm, telemetry, peripheral IV upon PT entry to room.     General Precautions: Standard, fall   Orthopedic Precautions: (B AKA)   Braces: N/A  Respiratory Status: Room air     Functional Mobility:  · Bed Mobility:     · Rolling Left:  modified independence  · Rolling Right: modified independence  · Supine to Sit: contact guard assistance      AM-PAC 6 CLICK  MOBILITY          Therapeutic Activities and Exercises:   Transferred EOB, donned extra gown.    Scooted in bed with S.   Posterior scoot into w/c.   Propelled chair in hallway with  SBA.     Patient left up in chair with all lines intact, call button in reach and nurse Angelica notified..    GOALS:   Multidisciplinary Problems     Physical Therapy Goals        Problem: Physical Therapy    Goal Priority Disciplines Outcome Goal Variances Interventions   Physical Therapy Goal     PT, PT/OT Ongoing, Progressing     Description: Goals to be met by: 2022     Patient will increase functional independence with mobility by performin. Supine to sit with Modified [place]  2. Bed to chair transfer with Contact Guard Assistance using No Assistive Device anterior and posterior scooting  3. Wheelchair propulsion x250 feet with Modified [place] using bilateral uppper extremities                     Time Tracking:     PT Received On: 22  PT Start Time: 923     PT Stop Time: 946  PT Total Time (min): 23 min     Billable Minutes: Therapeutic Activity 23min    Treatment Type: Treatment  PT/PTA: PTA     PTA Visit Number: 2     2022

## 2022-08-18 NOTE — PLAN OF CARE
Pt clear for DC from case management standpoint. Discharging to home with Corazon in Home      08/18/22 1529   Final Note   Assessment Type Final Discharge Note   Anticipated Discharge Disposition Home-Health   Hospital Resources/Appts/Education Provided Appointments scheduled and added to AVS

## 2022-08-18 NOTE — PLAN OF CARE
POC/Meds reviewed, pt verbalized understanding. Vitals stable.  Afebrile.  Tele In place-[id number]. ns infusing at 100. blood glucose monitored. Prn s/s given. Repositions self. Hourly/Q2hr rounding performed, safety maintained. Bed in lowest position, wheels locked, SR up x2, call light in easy reach. No  complaints at this time.

## 2022-08-18 NOTE — DISCHARGE SUMMARY
[facility] Hospital Medicine  Discharge Summary      Patient Name: Suyapa Connelly  MRN: [id number]  Patient Class: IP- Inpatient  Admission Date: 8/13/2022  Hospital Length of Stay: 5 days  Discharge Date and Time:  08/18/2022 12:48 PM  Attending Physician: Caty Otero MD   Discharging Provider: Caty Otero MD  Primary Care Provider: Magen Christensen MD      HPI:    Suyapa Connelly is a 56 year old female with a past medical history of HTN, DM, CKD, CAD and past surgical history of bilateral AKA due to PAD and CABG, who presented to the ED with confusion and unresponsiveness. She was admitted three days ago for confusion with a CT notable for hydrocephalus, but her  signed her out AMA as he felt she had improved enough to take her home and no longer wanted her to be in the hospital, despite her persistent confusion. She was responsive and communicating more than she was on initial admit, so he said he felt he could care for her better at home. They went to [facility] today, where she started drooling and became confused and unresponsive. She denied any complaints prior. She denies complaint at this time.     Upon arrival to the ED, she was minimally responsive. Staff performed a sternal rub, which woke her up quickly. She was hypotensive and received IV boluses, which improved her blood pressure and mentation. She is currently awake, alert and oriented to self, place and situation. She is not correct with dates. Dr. Arthur with Neurology was consulted by the ED, and did not recommend emergent treatment. He did say there was a possibility of the patient having a lumbar puncture at some point tomorrow. Urinalysis was positive for infection. Her lactic acid level was within normal limits. She has an acute on chronic kidney injury. CBC shows chronic anemia. Hospital Medicine consulted for admission and further management.          * No surgery found *      Hospital Course:   And was admitted  to medicine telemetry service.  Patient was initiated on intravenous broad-spectrum antibiotic therapy for urinary tract infection.  Urine cultures remain negative.  Patient was evaluated by neurologist.  Neurological workup was negative for any acute intracranial process or seizure activity as listed below.  Patient's mental status improved.  Patient worked with PT and OT.  A nephrologist was consulted regarding persisting metabolic acidosis and chronic kidney disease stage 3.  Sodium bicarbonate supplementation dose was increased while transiently diuretic therapy was held.  Patient's symptoms have improved.  Patient feels ready to go home.  Home health services are being arranged for surveillance of renal panel and acid-base balance.  Patient also encouraged to improve oral protein intake.  Patient to continue close follow-up with providers including neurologist and nephrologist as outpatient.  Patient to undergo outpatient neurology workup for leukodystrophy.  Discharge plan of care reviewed with the patient who voiced understanding.    Goals of Care Treatment Preferences:  Code Status: Full Code      Consults:   Consults (From admission, onward)        Status Ordering Provider     Inpatient consult to Nephrology  Once        Provider:  Ricky Oliveira MD    Completed THERESA CURTIS consult to case management  Once        Provider:  (Not yet assigned)    Completed JOLIE SALES     Inpatient consult to Neurology  Once        Provider:  Emanuel Arthur MD    Completed KARINA DUPONT        Microbiology Results (last 7 days)     Procedure Component Value Units Date/Time    Urine Culture High Risk [[id number]] Collected: 08/15/22 1650    Order Status: Completed Specimen: Urine Updated: 08/16/22 2221     Urine Culture, Routine No growth    Narrative:      Indicated criteria for high risk culture:->Other  Other (specify):->severe sepsis    Urine culture [[id number]] Collected: 08/13/22 1855    Order Status:  Completed Specimen: Urine Updated: 08/15/22 0115     Urine Culture, Routine No growth    Narrative:      Specimen Source->Urine        CT Head (08-10-22):  1. Suspected vasogenic edema in the bilateral parietooccipital lobes which can be seen with PRES.  Recommend clinical correlation and consider further evaluation with MRI.  2. Senescent changes and chronic microvascular ischemic changes.     MRI Brain:  The study is mildly motion degraded.  There is however no acute abnormality and little if any change compared to a prior brain MRI dated 06/02/2021.  The ventricles are mildly prominent but without definite change.  There is no midline shift.  There is at least moderate white matter change.  These findings however were present on the comparison MRI and without any significant or definite change.  Findings do not strongly suggest PRES.  There is no hemorrhage, mass or acute infarction.     MRA Brain:  Mild irregularity of the left greater than right cavernous internal carotid arteries without significant atherosclerotic narrowing.  No other high-grade stenosis, large vessel occlusion or aneurysm.     MRA Neck:   Mild narrowing at bilateral carotid bifurcations with less than 50% stenosis.  Otherwise, unremarkable MRA of the neck vasculature.     CXR: There are linear regions of atelectasis and bilateral lower lung zones.     CT Head:  There is no evidence acute intracranial process.  There is cerebral atrophy with ventricular dilatation and periventricular white matter changes.  The degree of ventricular dilatation is similar to what was seen on MRI from 08/11/2022.     CXR: Left internal jugular central venous catheter placement.    Final Active Diagnoses:    Diagnosis Date Noted POA    PRINCIPAL PROBLEM:  Severe sepsis [A41.9, R65.20] 01/17/2022 Yes    Metabolic acidosis [E87.2] 08/16/2022 Yes    Communicating hydrocephalus [G91.0] 08/13/2022 Yes    Confusion [R41.0] 08/11/2022 Yes    Status post above-knee  amputation of both lower extremities [Z89.611, Z89.612] 03/26/2022 Not Applicable    UTI (urinary tract infection) [N39.0] 03/15/2022 Yes    Class 2 severe obesity due to excess calories with serious comorbidity in adult [E66.01] 05/24/2021 Yes    Impaired functional mobility and endurance [Z74.09] 05/06/2021 Yes    Hyponatremia [E87.1] 07/22/2020 Yes    Paroxysmal atrial fibrillation [I48.0] 01/28/2020 Yes     Chronic    Anemia [D64.9] 01/27/2020 Yes    Type 2 diabetes mellitus with hyperglycemia, with long-term current use of insulin [E11.65, Z79.4] 01/02/2020 Not Applicable    CAD (coronary artery disease) [I25.10] 01/02/2020 Yes    Mixed hyperlipidemia [E78.2] 12/13/2019 Yes     Chronic    CAROLIN (generalized anxiety disorder) [F41.1] 05/07/2018 Yes     Chronic    Moderate malnutrition [E44.0] 05/05/2018 Yes    Essential hypertension [I10] 05/05/2018 Yes    Acute renal failure superimposed on stage 3 chronic kidney disease [N17.9, N18.30] 07/07/2015 Yes      Problems Resolved During this Admission:       Discharged Condition: good    Disposition: Home or Self Care    Follow Up:   Follow-up Information     Magen Christensen MD Follow up in 1 week(s).    Specialty: Family Medicine  Contact information:  [address] [facility] [address]  [phone]             Ricky Oliveira MD Follow up in 2 week(s).    Specialty: Nephrology  Contact information:  [address] [facility] [address]  [phone]             Emanuel Arthur MD Follow up in 2 week(s).    Specialty: Neurology  Contact information:  [address] [facility] [address]  [phone]                       Patient Instructions:      COMPREHENSIVE METABOLIC PANEL   Standing Status: Future Standing Exp. Date: 10/17/23     CBC W/ AUTO DIFFERENTIAL   Standing Status: Future Standing Exp. Date: 10/17/23     Ambulatory referral/consult to Outpatient Case Management   Referral Priority: Routine Referral  Type: Consultation   Referral Reason: Specialty Services Required   Number of Visits Requested: 1     Ambulatory referral/consult to Home Health   Standing Status: Future   Referral Priority: Routine Referral Type: Home Health Care   Referral Reason: Specialty Services Required   Requested Specialty: Home Health Services   Number of Visits Requested: 1     Diet Cardiac     Diet diabetic   Order Comments: Glucerna can with meals     Diet renal     Notify your health care provider if you experience any of the following:  temperature >100.4     Notify your health care provider if you experience any of the following:  persistent nausea and vomiting or diarrhea     Notify your health care provider if you experience any of the following:  severe uncontrolled pain     Notify your health care provider if you experience any of the following:  persistent dizziness, light-headedness, or visual disturbances     Notify your health care provider if you experience any of the following:  increased confusion or weakness     Activity as tolerated   Order Comments: Fall precautions       Significant Diagnostic Studies: Labs:   CMP   Recent Labs   Lab 08/17/22  0936 08/18/22  0528   * 136   K 4.8 4.7   * 112*   CO2 14* 16*   * 81   BUN 44* 44*   CREATININE 2.4* 2.0*   CALCIUM 7.9* 8.3*   PROT 5.6* 5.7*   ALBUMIN 1.8* 1.8*   BILITOT 0.2 0.2   ALKPHOS 103 103   AST 9* 13   ALT 6* 9*   ANIONGAP 9 8   , CBC   Recent Labs   Lab 08/17/22  0936 08/18/22  0528   WBC 5.86 6.37   HGB 8.7* 8.3*   HCT 26.3* 26.1*    270    and INR   Lab Results   Component Value Date    INR 1.1 03/15/2022    INR 1.4 (H) 06/17/2021    INR 1.2 05/18/2021       Pending Diagnostic Studies:     Procedure Component Value Units Date/Time    Vitamin B6 [[id number]] Collected: 08/14/22 1549    Order Status: Sent Lab Status: In process Updated: 08/14/22 4409    Specimen: Blood          Medications:  Reconciled Home Medications:      Medication List       CHANGE how you take these medications    NovoLOG Flexpen U-100 Insulin 100 unit/mL (3 mL) Inpn pen  Generic drug: insulin aspart U-100  INJECT 5 UNITS INTO SKIN THREE TIMES DAILY WITH MEALS PLUS SLIDING SCALE FOR MAX DAILY DOSE OF 25 UNITS, HOLD IF NOT EATING  What changed:   · how much to take  · additional instructions     sodium bicarbonate 650 MG tablet  Take 2 tablets (1,300 mg total) by mouth 3 (three) times daily.  What changed:   · how much to take  · when to take this  · additional instructions        CONTINUE taking these medications    acetaminophen 500 MG tablet  Commonly known as: TYLENOL  Take 2 tablets (1,000 mg total) by mouth every 8 (eight) hours as needed for Pain.     albuterol 2.5 mg /3 mL (0.083 %) nebulizer solution  Commonly known as: PROVENTIL  INHALE 3MLS EVERY 4 HOURS AS NEEDED FOR SHORTNESS OF BREATH FOR UP TO 30 DAYS     allopurinoL 100 MG tablet  Commonly known as: ZYLOPRIM  Take 1 tablet (100 mg total) by mouth once daily.     amitriptyline 10 MG tablet  Commonly known as: ELAVIL  Take 1 tablet (10 mg total) by mouth nightly as needed for Pain.     aspirin 81 MG EC tablet  Commonly known as: ECOTRIN  Take 81 mg by mouth once daily at 6am.     atorvastatin 80 MG tablet  Commonly known as: LIPITOR  TAKE 1 TABLET(80 MG) BY MOUTH EVERY NIGHT     carvediloL 3.125 MG tablet  Commonly known as: COREG  TAKE 1 TABLET(3.125 MG) BY MOUTH TWICE DAILY WITH BREAKFAST AND DINNER     clopidogreL 75 mg tablet  Commonly known as: PLAVIX  TAKE 1 TABLET(75 MG) BY MOUTH DAILY     DEXCOM G6  Misc  Generic drug: blood-glucose meter,continuous  Use to check blood sugars 4 times/day     DEXCOM G6 SENSOR Radha  Generic drug: blood-glucose sensor  Apply one sensor to skin every 10 days     DEXCOM G6 TRANSMITTER Radha  Generic drug: blood-glucose transmitter  Replace transmitter every 3 months to use with dexcom sensor     furosemide 40 MG tablet  Commonly known as: LASIX  Take 1 tablet (40 mg total)  by mouth once daily.     insulin detemir U-100 100 unit/mL (3 mL) Inpn pen  Commonly known as: Levemir FLEXTOUCH  Inject 12 Units into the skin every evening.     insulin glargine 100 unit/mL injection  Commonly known as: Lantus  Inject 8 Units into the skin every evening.     magnesium oxide 400 mg (241.3 mg magnesium) tablet  Commonly known as: MAG-OX  Take 1 tablet (400 mg total) by mouth 2 (two) times daily.     melatonin 3 mg tablet  Commonly known as: MELATIN  Take 2 tablets (6 mg total) by mouth nightly as needed for Insomnia.     NIFEdipine 30 MG (OSM) 24 hr tablet  Commonly known as: PROCARDIA-XL  Take 1 tablet (30 mg total) by mouth once daily.     ondansetron 8 MG Tbdl  Commonly known as: ZOFRAN-ODT  Dissolve 1 tablet (8 mg total) by mouth every 8 (eight) hours as needed.     ONE-A-DAY WOMEN'S 50 PLUS ORAL  Take 1 tablet by mouth Daily.     oxyCODONE 5 MG immediate release tablet  Commonly known as: ROXICODONE  Take 1 tablet (5 mg total) by mouth every 6 (six) hours as needed for Pain.     pantoprazole 40 MG tablet  Commonly known as: PROTONIX  TAKE 1 TABLET(40 MG) BY MOUTH DAILY     pregabalin 75 MG capsule  Commonly known as: LYRICA  Take 1 capsule (75 mg total) by mouth 3 (three) times daily.     rivaroxaban 10 mg Tab  Commonly known as: XARELTO  Take 2.5 mg by mouth nightly.     sertraline 50 MG tablet  Commonly known as: ZOLOFT  TAKE 1 TABLET(50 MG) BY MOUTH DAILY            Indwelling Lines/Drains at time of discharge:   Lines/Drains/Airways     Central Venous Catheter Line  Duration           Percutaneous Central Line Insertion/Assessment - Triple Lumen  08/13/22 2036 left internal jugular 4 days                Time spent on the discharge of patient: 32 minutes         Caty Otero MD  Department of Hospital Medicine  [facility]

## 2022-08-18 NOTE — CONSULTS
Consulted for wound to left labia wound shearing noted. Wound measures 2cm x 0.2cm x 0.1cm. Cleaned area with wipes and dried. Applied Triad hydrophilic wound dressing ointment to the area and left open to air. Scant amount of bloody drainage noted from this area. Plan of care discussed with the patient and patients spouse regarding skin integrity. Patients spouse reports he will buy some Triad for home. Zinc oxide tube in room which may be used as well to this site until Triad is available. Patients spouse verbalized understanding regarding wound care for home.

## 2022-08-18 NOTE — PLAN OF CARE
Spoke with pt's spouse regarding HH. He would like to use [facility]- states they have used this company in past and were very happy with them. Spouse also requesting new PCP with [facility] in [place]    HH orders sent to [facility].       08/18/22 6367   Post-Acute Status   Post-Acute Authorization Home Health   Home Health Status Referrals Sent   Patient choice form signed by patient/caregiver List from [facility]

## 2022-08-18 NOTE — PROGRESS NOTES
Nephrology Consult Note        Patient Name: Suyapa Connelly  MRN: [id number]    Patient Class: IP- Inpatient   Admission Date: 8/13/2022  Length of Stay: 5 days  Date of Service: 8/18/2022    Attending Physician: Caty Otero MD  Primary Care Provider: Magen Christensen MD    Reason for Consult: roslyn/acidosis/hyponatremia/hyperglycemia/dm2/ckd3/anemia/htn    SUBJECTIVE:     HPI: 56F with HTN, DM, CKD stage 3, bilateral AKA due to PAD, CABG, presented with confusion and unresponsiveness on 8/13. She was admitted on 8/10 for confusion with a CT notable for hydrocephalus, but her  signed her out AMA as he felt she had improved enough to take her home and no longer wanted her to be in the hospital, despite her persistent confusion. She was responsive and communicating more than she was on initial admit, so he felt he could care for her better at home.     They went to [facility] on 8/13, where she started drooling and became confused and unresponsive.      Upon arrival to the ED, she was minimally responsive. Staff performed a sternal rub, which woke her up quickly. She was hypotensive and received IV boluses, which improved her blood pressure and mentation.    Labs were pertinent for hyponatremia, acidosis, ROSLYN, hypoalbuminemia. She has chronic, probably diabetic, proteinuria and occasional hematuria and yeast in urine. On imaging in 5/2021 had bilateral retroperitoneal hematomas, but no renal obstruction. Renal is consulted for co-management of acidosis and roslyn.     Since 2019 she had repeated episodes of SHERRELL, which seem to be recovering to baseline renal function around 1 - most recent event in 3/2022. Brief episodes of hyponatremia and uncontrolled acidosis seem to coincide with the ROSLYN episodes as well as uncontrolled BGs. Lactate is currently normal.    8/1 VSS. Spoke to  yesterday. Will resume home dose lasix today, stop IVF. Can be dc and f/u with me as outpatient.    Past Medical History:    Diagnosis Date    Anxiety     Chronic pain syndrome     CKD (chronic kidney disease), stage III     Depression     Diabetes mellitus     type 2    Diabetes mellitus, type 2     GERD (gastroesophageal reflux disease)     Hyperemesis 3/23/2021    Hyperlipemia     Hypertension     Hypokalemia 3/23/2021    Myocardial infarction 2010    minor-caused by stress per pt.    Osteomyelitis     Osteomyelitis of left foot 4/30/2021    PVD (peripheral vascular disease)     Vaginal delivery     x1     Past Surgical History:   Procedure Laterality Date    ABOVE-KNEE AMPUTATION Left 5/18/2021    Procedure: AMPUTATION, ABOVE KNEE;  Surgeon: Teddy Huber MD;  Location: [facility] OR [address] [place] [address];  Service: Vascular;  Laterality: Left;    ABOVE-KNEE AMPUTATION Right 3/18/2022    Procedure: AMPUTATION, ABOVE KNEE;  Surgeon: DAYNE Florez II, MD;  Location: [facility] OR [address] [place] [address];  Service: Vascular;  Laterality: Right;    Angiogram - Right Extremity Right 7/9/15    angiogram-left leg  10/6/15    ANGIOGRAPHY OF LOWER EXTREMITY Left 4/29/2021    Procedure: ANGIOGRAM, LOWER EXTREMITY;  Surgeon: Teddy Huber MD;  Location: [address];  Service: Vascular;  Laterality: Left;    BELOW KNEE AMPUTATION OF LOWER EXTREMITY Right 12/28/2021    Procedure: AMPUTATION, BELOW KNEE;  Surgeon: Kaitlyn Rojas MD;  Location: [facility];  Service: General;  Laterality: Right;    CATHETERIZATION OF BOTH LEFT AND RIGHT HEART N/A 12/18/2019    Procedure: CATHETERIZATION, HEART, BOTH LEFT AND RIGHT;  Surgeon: Que Fernando III, MD;  Location: [facility] CATH LAB;  Service: Cardiology;  Laterality: N/A;    CORONARY ANGIOGRAPHY N/A 12/18/2019    Procedure: ANGIOGRAM, CORONARY ARTERY;  Surgeon: Que Fernando III, MD;  Location: [facility] CATH LAB;  Service: Cardiology;  Laterality: N/A;    CORONARY ANGIOGRAPHY INCLUDING BYPASS GRAFTS WITH CATHETERIZATION OF LEFT HEART N/A 7/28/2020    Procedure: ANGIOGRAM, CORONARY, INCLUDING  BYPASS GRAFT, WITH LEFT HEART CATHETERIZATION, 9 am;  Surgeon: Rachel Easley MD;  Location: [facility] CATH LAB;  Service: Cardiology;  Laterality: N/A;    CORONARY ARTERY BYPASS GRAFT (CABG) N/A 1/14/2020    Procedure: CORONARY ARTERY BYPASS GRAFT (CABG) x 1     Off Pump;  Surgeon: Huang Altamirano MD;  Location: [facility] OR [address] [place] [address];  Service: Cardiovascular;  Laterality: N/A;    CREATION OF FEMORAL-TIBIAL ARTERY BYPASS Left 4/29/2021    Procedure: CREATION, BYPASS, ARTERIAL, FEMORAL TO ANTERIOR TIBIAL;  Surgeon: Teddy Huber MD;  [facility];  Service: Vascular;  Laterality: Left;    CREATION OF FEMOROPOPLITEAL ARTERIAL BYPASS USING GRAFT Left 8/18/2020    Procedure: CREATION, BYPASS, ARTERIAL, FEMORAL TO POPLITEAL, USING GRAFT, LEFT LOWER EXTREMITY;  Surgeon: Teddy Huber MD;  Location: [facility];  Service: Vascular;  Laterality: Left;  REQUEST 7:15 A.M. START----COVID NEGATIVE ON 8/17  1ST CASE STARTE PER LEANA ON 8/7/2020 @ 942AM-  RN PREOP 8/12/2020   T/S-----CLEARED BY CARDS-------PENDING INSURANCE    DEBRIDEMENT OF FOOT Left 9/8/2020    Procedure: DEBRIDEMENT, FOOT;  Surgeon: Rosio Mayes DPM;  Location: [facility];  Service: Podiatry;  Laterality: Left;  request neoxx .   RN Pre Op 9-4-2020, Covid negative on 9/5/20. C A    DEBRIDEMENT OF FOOT  3/4/2021    Procedure: DEBRIDEMENT, FOOT;  Surgeon: Teddy Huber MD;  Location: [facility] OR;  Service: Vascular;;    DEBRIDEMENT OF FOOT Left 3/9/2021    Procedure: DEBRIDEMENT, FOOT, bone biopsy;  Surgeon: Rosio Mayes DPM;  Location: [facility] OR;  Service: Podiatry;  Laterality: Left;  Request neoxx---COVID IN AM  REQUESTING NOON START  RN Phone Pre op.On Blood thinners Plavix and Eliquis.  Covid am of surgery. C A    DEBRIDEMENT OF FOOT Left 5/4/2021    Procedure: DEBRIDEMENT, FOOT;  Surgeon: Farooq Morley DPM;  [address];  Service: Podiatry;  Laterality: Left;    INSERTION OF TUNNELED CENTRAL VENOUS  HEMODIALYSIS CATHETER N/A 1/27/2020    Procedure: Insertion, Catheter, Central Venous, Hemodialysis;  Surgeon: ESTEBAN Gomez III, MD;  Location: [facility] CATH LAB;  Service: Peripheral Vascular;  Laterality: N/A;    PERCUTANEOUS TRANSLUMINAL ANGIOPLASTY N/A 3/4/2021    Procedure: PTA (ANGIOPLASTY, PERCUTANEOUS, TRANSLUMINAL);  Surgeon: Teddy Huber MD;  Location: [facility] OR;  Service: Vascular;  Laterality: N/A;    REMOVAL OF ARTERIOVENOUS GRAFT Left 5/27/2021    Procedure: REMOVAL, GRAFT, LEFT LOWER EXTREMITY, WOUND EXPLORATION;  Surgeon: Teddy Huber MD;  Location: [facility] OR 2ND FLR;  Service: Vascular;  Laterality: Left;    REMOVAL OF NAIL OF DIGIT Left 3/9/2021    Procedure: REMOVAL, NAIL, DIGIT;  Surgeon: Rosio Mayes DPM;  Location: [facility] OR;  Service: Podiatry;  Laterality: Left;    THROMBECTOMY Left 3/4/2021    Procedure: THROMBECTOMY, LEFT LOWER EXTREMITY BYPASS GRAFT, ANGIOGRAM, POSSIBLE INTERVENTION, POSSIBLE LEFT LOWER EXTREMITY BYPASS;  Surgeon: Teddy Huber MD;  Location: [facility] OR;  Service: Vascular;  Laterality: Left;    THROMBECTOMY Left 4/29/2021    Procedure: GRAFT THROMBECTOMY, LEFT LOWER EXTREMITY;  Surgeon: Teddy Huber MD;  Location: [facility] OR 2ND FLR;  Service: Vascular;  Laterality: Left;  14.5 min  1179.85 mGy  341.01 Gycm2  240 ml dye    THROMBECTOMY  10/22/2021    Procedure: THROMBECTOMY;  Surgeon: Saad Arenas MD;  Location: [facility] CATH LAB/EP;  Service: Cardiology;;     Family History   Problem Relation Age of Onset    Diabetes Mother     Diabetes Father     Heart disease Maternal Grandmother     No Known Problems Maternal Grandfather     Diabetes Paternal Grandmother     No Known Problems Paternal Grandfather     Anesthesia problems Neg Hx      Social History     Tobacco Use    Smoking status: Former Smoker    Smokeless tobacco: Never Used   Substance Use Topics    Alcohol use: No    Drug use: Yes     Types: Marijuana     Comment: occassional        Review of patient's allergies indicates:   Allergen Reactions    Ciprofloxacin Itching    Contrast media      Kidney injury    Iodine      Kidney injury       Outpatient meds:  No current facility-administered medications on file prior to encounter.     Current Outpatient Medications on File Prior to Encounter   Medication Sig Dispense Refill    acetaminophen (TYLENOL) 500 MG tablet Take 2 tablets (1,000 mg total) by mouth every 8 (eight) hours as needed for Pain.  0    albuterol (PROVENTIL) 2.5 mg /3 mL (0.083 %) nebulizer solution INHALE 3MLS EVERY 4 HOURS AS NEEDED FOR SHORTNESS OF BREATH FOR UP TO 30 DAYS 180 mL 0    allopurinoL (ZYLOPRIM) 100 MG tablet Take 1 tablet (100 mg total) by mouth once daily. 30 tablet 5    amitriptyline (ELAVIL) 10 MG tablet Take 1 tablet (10 mg total) by mouth nightly as needed for Pain. 30 tablet 12    aspirin (ECOTRIN) 81 MG EC tablet Take 81 mg by mouth once daily at 6am.      atorvastatin (LIPITOR) 80 MG tablet TAKE 1 TABLET(80 MG) BY MOUTH EVERY NIGHT 90 tablet 3    blood-glucose meter,continuous (DEXCOM G6 ) Misc Use to check blood sugars 4 times/day 1 each 0    blood-glucose sensor (DEXCOM G6 SENSOR) Radha Apply one sensor to skin every 10 days 3 each 11    blood-glucose transmitter (DEXCOM G6 TRANSMITTER) Radha Replace transmitter every 3 months to use with dexcom sensor 1 each 3    carvediloL (COREG) 3.125 MG tablet TAKE 1 TABLET(3.125 MG) BY MOUTH TWICE DAILY WITH BREAKFAST AND DINNER 180 tablet 0    clopidogreL (PLAVIX) 75 mg tablet TAKE 1 TABLET(75 MG) BY MOUTH DAILY 90 tablet 3    furosemide (LASIX) 40 MG tablet Take 1 tablet (40 mg total) by mouth once daily. 30 tablet 0    insulin detemir U-100 (LEVEMIR FLEXTOUCH) 100 unit/mL (3 mL) SubQ InPn pen Inject 12 Units into the skin every evening. 20 mL 0    insulin glargine (LANTUS) 100 unit/mL injection Inject 8 Units into the skin every evening.      magnesium oxide (MAG-OX) 400 mg (241.3 mg  magnesium) tablet Take 1 tablet (400 mg total) by mouth 2 (two) times daily.  0    melatonin (MELATIN) 3 mg tablet Take 2 tablets (6 mg total) by mouth nightly as needed for Insomnia.  0    multivit/folic acid/vit K1 (ONE-A-DAY WOMEN'S 50 PLUS ORAL) Take 1 tablet by mouth Daily.      NIFEdipine (PROCARDIA-XL) 30 MG (OSM) 24 hr tablet Take 1 tablet (30 mg total) by mouth once daily. 30 tablet 2    NOVOLOG FLEXPEN U-100 INSULIN 100 unit/mL (3 mL) InPn pen INJECT 5 UNITS INTO SKIN THREE TIMES DAILY WITH MEALS PLUS SLIDING SCALE FOR MAX DAILY DOSE OF 25 UNITS, HOLD IF NOT EATING (Patient taking differently: 8 Units. INJECT 8 UNITS INTO SKIN THREE TIMES DAILY WITH MEALS PLUS SLIDING SCALE FOR MAX DAILY DOSE OF 25 UNITS, HOLD IF NOT EATING) 15 mL 0    ondansetron (ZOFRAN-ODT) 8 MG TbDL Dissolve 1 tablet (8 mg total) by mouth every 8 (eight) hours as needed. 30 tablet 0    oxyCODONE (ROXICODONE) 5 MG immediate release tablet Take 1 tablet (5 mg total) by mouth every 6 (six) hours as needed for Pain. 28 tablet 0    pantoprazole (PROTONIX) 40 MG tablet TAKE 1 TABLET(40 MG) BY MOUTH DAILY 90 tablet 0    pregabalin (LYRICA) 75 MG capsule Take 1 capsule (75 mg total) by mouth 3 (three) times daily. 90 capsule 6    rivaroxaban (XARELTO) 10 mg Tab Take 2.5 mg by mouth nightly.      sertraline (ZOLOFT) 50 MG tablet TAKE 1 TABLET(50 MG) BY MOUTH DAILY 30 tablet 11    [DISCONTINUED] lancing device Misc 1 Device by Misc.(Non-Drug; Combo Route) route 2 (two) times daily with meals. 1 each 0    [DISCONTINUED] sodium bicarbonate 650 MG tablet Take 1 tablet (650 mg total) by mouth 2 (two) times daily. Please hold until follow up with Primary Care Provider 60 tablet 11       Scheduled meds:   allopurinoL  100 mg Oral Daily    aspirin  81 mg Oral Daily    atorvastatin  80 mg Oral QHS    carvediloL  3.125 mg Oral BID    clopidogreL  75 mg Oral Daily    insulin detemir U-100  16 Units Subcutaneous QHS    magnesium oxide   400 mg Oral BID    meropenem (MERREM) IVPB  500 mg Intravenous Q12H    NIFEdipine  30 mg Oral Daily    pantoprazole  40 mg Oral Daily    polyethylene glycol  17 g Oral Daily    pregabalin  75 mg Oral TID    rivaroxaban  2.5 mg Oral Nightly    sertraline  50 mg Oral Daily    sodium bicarbonate  1,300 mg Oral TID       Infusions:   sodium chloride 0.9% 100 mL/hr at 08/18/22 0533       PRN meds:  acetaminophen, acetaminophen, aluminum-magnesium hydroxide-simethicone, amitriptyline, dextrose 10%, dextrose 10%, glucose, glucose, insulin aspart U-100, magnesium oxide, magnesium oxide, melatonin, naloxone, ondansetron, oxyCODONE, potassium bicarbonate, potassium bicarbonate, potassium bicarbonate, potassium, sodium phosphates, potassium, sodium phosphates, potassium, sodium phosphates, simethicone, sodium chloride 0.9%    Review of Systems:    OBJECTIVE:     Vital Signs and IO (Last 24H):  Temp:  [97.3 °F (36.3 °C)-99.3 °F (37.4 °C)]   Pulse:  [84-91]   Resp:  [17-20]   BP: ()/(49-71)   SpO2:  [95 %-100 %]   I/O last 3 completed shifts:  In: 300 [P.O.:300]  Out: 600 [Urine:600]    Wt Readings from Last 5 Encounters:   08/13/22 71.8 kg (158 lb 4.6 oz)   08/11/22 68.1 kg (150 lb 2.1 oz)   03/18/22 65.3 kg (144 lb)   03/12/22 65.3 kg (144 lb)   02/01/22 67.6 kg (149 lb 0.5 oz)     Physical Exam:  Physical Exam  Constitutional:       Appearance: She is well-developed. She is not diaphoretic.   HENT:      Head: Normocephalic and atraumatic.   Eyes:      General: No scleral icterus.     Pupils: Pupils are equal, round, and reactive to light.   Cardiovascular:      Rate and Rhythm: Normal rate and regular rhythm.   Pulmonary:      Effort: Pulmonary effort is normal. No respiratory distress.      Breath sounds: No stridor.   Abdominal:      General: There is no distension.      Palpations: Abdomen is soft.   Musculoskeletal:         General: Swelling present. No deformity. Normal range of motion.      Cervical back:  Neck supple.   Skin:     General: Skin is warm and dry.      Findings: No erythema or rash.   Neurological:      Mental Status: She is alert and oriented to person, place, and time.      Cranial Nerves: No cranial nerve deficit.   Psychiatric:         Behavior: Behavior normal.         Body mass index is 26.34 kg/m².    Laboratory:  Recent Labs   Lab 08/16/22 0413 08/17/22  0936 08/18/22  0528   * 134* 136   K 4.5 4.8 4.7   * 111* 112*   CO2 14* 14* 16*   BUN 41* 44* 44*   CREATININE 2.5* 2.4* 2.0*   * 161* 81       Recent Labs   Lab 08/16/22 0413 08/17/22  0936 08/18/22  0528   CALCIUM 8.1* 7.9* 8.3*   ALBUMIN 1.7* 1.8* 1.8*   PHOS 4.6* 3.9 3.8   MG 1.6 1.7 1.8       Recent Labs   Lab 07/22/20  0453   PTH, Intact 77.8 H       Recent Labs   Lab 08/16/22  0723 08/16/22  1137 08/16/22  1638 08/16/22  2138 08/17/22  0756 08/17/22  1129 08/17/22  1642 08/17/22  2131 08/18/22  0814 08/18/22  1142   POCTGLUCOSE 78 213* 174* 268* 267* 129* 235* 242* 93 162*       Recent Labs   Lab 12/03/21  1730 03/18/22  1944 03/26/22  1133   Hemoglobin A1C 9.6 H 7.0 H 6.9 H       Recent Labs   Lab 08/16/22 0413 08/17/22  0936 08/18/22  0528   WBC 7.35 5.86 6.37   HGB 8.4* 8.7* 8.3*   HCT 25.5* 26.3* 26.1*    271 270   MCV 92 91 92   MCHC 32.9 33.1 31.8*   MONO 12.5  0.9 14.8  0.9 15.9*  1.0       Recent Labs   Lab 08/16/22 0413 08/17/22  0936 08/18/22  0528   BILITOT 0.2 0.2 0.2   PROT 5.4* 5.6* 5.7*   ALBUMIN 1.7* 1.8* 1.8*   ALKPHOS 92 103 103   ALT 9* 6* 9*   AST 10 9* 13       Recent Labs   Lab 01/03/22  1724 01/31/22  1751 03/26/22  0749 08/10/22  1909 08/13/22  1855   Color, UA Yellow   < > Yellow Yellow Yellow   Appearance, UA Cloudy A   < > Cloudy A Clear Cloudy A   pH, UA 6.0   < > 6.0 6.0 6.0   Specific [place], UA 1.020   < > 1.010 1.010 1.025   Protein, UA 3+ A   < > 2+ A 2+ A 2+ A   Glucose, UA Trace A   < > Negative 4+ A Negative   Ketones, UA Negative   < > Negative Negative Negative    Urobilinogen, UA Negative  --   --  Negative Negative   Bilirubin (UA) Negative   < > Negative Negative 1+ A   Occult Blood UA 2+ A   < > 1+ A 1+ A 3+ A   Nitrite, UA Negative   < > Negative Negative Negative   RBC, UA 12 H   < > 45 H 1 12 H   WBC, UA >100 H   < > >100 H 2 >100 H   Bacteria Moderate A   < > None Occasional Many A   Hyaline Casts, UA 0   < > 0 7 A 0    < > = values in this interval not displayed.       Recent Labs   Lab 05/15/21  1310 07/04/21  1457 03/19/22  0211 08/10/22  2108 08/16/22  1052   POC PH 7.308 L  --   --  7.357 7.222 L   POC PCO2 37.4  --   --  35.3 35.9   POC HCO3 18.7 L  --   --  19.8 L 14.7 L   POC PO2 45  --   --  39 LL 37 L   POC SATURATED O2 77 L  --   --  72 L 60 L   POC BE -8  --   --  -6 -13   Sample VENOUS   < > VENOUS CAPILLARY VENOUS    < > = values in this interval not displayed.       Microbiology Results (last 7 days)     Procedure Component Value Units Date/Time    Urine Culture High Risk [[id number]] Collected: 08/15/22 1650    Order Status: Completed Specimen: Urine Updated: 08/16/22 2221     Urine Culture, Routine No growth    Narrative:      Indicated criteria for high risk culture:->Other  Other (specify):->severe sepsis    Urine culture [[id number]] Collected: 08/13/22 1855    Order Status: Completed Specimen: Urine Updated: 08/15/22 0115     Urine Culture, Routine No growth    Narrative:      Specimen Source->Urine        ASSESSMENT/PLAN:     ROSLYN  Acidosis due to ROSLYN and uncontrolled DM2  Hyponatremia due to ROSLYN and uncontrolled DM2  Hyperglycemia with poorly controlled DM2  CKD stage 3 with diabetic proteinuria, baseline sCr around 1, probably < 1  No NSAIDs, ACEI/ARB, IV contrast or other nephrotoxins.  Keep MAP > 60, SBP > 100.  Dose meds for GFR < 30 ml/min.  Renal diet - low K, low phos.  Treat hyperglycemia.  Resume home dose lasix, stop IVF.    - VBG confirm acidosis with inadequate compensation. Added oral bicarb.  - If acidosis persists beyond AK and  hyperglycemia, would consider further work-up as outpatient.  - Renal US confirms  patency.    Anemia of CKD  Hgb and HCT are acceptable. Monitor.  Will provide NAEEM and/or IV iron PRN.    HTN  BP seem controlled.   Tolerate asymptomatic HTN up to -160.  Continue home meds.  Low sodium diet.    Thank you for allowing us to participate in the care of your patient!   We will follow the patient and provide recommendations as needed.    Patient care time was spent personally by me on the following activities:   · Obtaining a history.  · Examination of patient.  · Providing medical care at the patients bedside.  · Developing a treatment plan with patient or surrogate and bedside caregivers.  · Ordering and reviewing laboratory studies, radiographic studies, pulse oximetry.  · Ordering and performing treatments and interventions.  · Evaluation of patient's response to treatment.  · Discussions with consultants while on the unit and immediately available to the patient.  · Re-evaluation of the patient's condition.  · Documentation in the medical record.     Ricky Oliveira MD    [facility]  [address] [place] [address]  [place] [address]    [phone] - tel  [phone] - fax    8/18/2022

## 2022-08-18 NOTE — PLAN OF CARE
Problem: Physical Therapy  Goal: Physical Therapy Goal  Description: Goals to be met by: 2022     Patient will increase functional independence with mobility by performin. Supine to sit with Modified [place]  2. Bed to chair transfer with Contact Guard Assistance using No Assistive Device anterior and posterior scooting  3. Wheelchair propulsion x250 feet with Modified [place] using bilateral uppper extremities    Outcome: Ongoing, Progressing   Therapeutic activity : bed mobility , w/c transfers, w/c propulsion.

## 2022-08-18 NOTE — PLAN OF CARE
Pt accepted by [facility]         08/18/22 1528   Post-Acute Status   Post-Acute Authorization Home Health   Home Health Status Set-up Complete/Auth obtained

## 2022-08-18 NOTE — PLAN OF CARE
[facility] home care denied due to being at [facility] .  order sent to [facility]       08/18/22 5653   Post-Acute Status   Post-Acute Authorization Home Health   Home Health Status Referrals Sent

## 2022-08-18 NOTE — SUBJECTIVE & OBJECTIVE
"Interval History: Afebrile. Mental status improved to baseline.  Patient answering questions appropriately.  Denies any focal subjective complaints.  No abdominal pain or urinary difficulties reported.  Patient denies any new focal subjective complaints.    Review of Systems   Constitutional:  Positive for fatigue. Negative for chills and fever.   HENT:  Negative for congestion and sore throat.    Eyes:  Negative for visual disturbance.   Respiratory:  Negative for cough and shortness of breath.    Cardiovascular:  Negative for chest pain and palpitations.   Gastrointestinal:  Negative for abdominal pain, nausea and vomiting.   Endocrine: Negative for cold intolerance and heat intolerance.   Genitourinary:  Negative for dysuria and hematuria.   Musculoskeletal:  Negative for arthralgias and myalgias.   Skin:  Negative for pallor and rash.   Neurological:  Negative for tremors and seizures.   Hematological:  Negative for adenopathy. Does not bruise/bleed easily.   Psychiatric/Behavioral:  Positive for confusion ("i felt like my brain was messed up, like i was there but i wasnt there") and decreased concentration. Negative for hallucinations.    All other systems reviewed and are negative.  Objective:     Vital Signs (Most Recent):  Temp: 97.3 °F (36.3 °C) (08/18/22 0745)  Pulse: 85 (08/18/22 0745)  Resp: 17 (08/18/22 0745)  BP: (!) 109/57 (08/18/22 0745)  SpO2: 100 % (08/18/22 0745)   Vital Signs (24h Range):  Temp:  [97.3 °F (36.3 °C)-99.3 °F (37.4 °C)] 97.3 °F (36.3 °C)  Pulse:  [84-91] 85  Resp:  [17-20] 17  SpO2:  [95 %-100 %] 100 %  BP: ()/(49-71) 109/57     Weight: 71.8 kg (158 lb 4.6 oz)  Body mass index is 26.34 kg/m².    Intake/Output Summary (Last 24 hours) at 8/18/2022 0840  Last data filed at 8/17/2022 1400  Gross per 24 hour   Intake 120 ml   Output --   Net 120 ml        Physical Exam  Vitals and nursing note reviewed.   Constitutional:       General: She is awake. She is not in acute distress.    "  Appearance: She is well-developed. She is obese. She is ill-appearing (chronically ill).   HENT:      Head: Normocephalic and atraumatic.      Mouth/Throat:      Lips: Pink.      Mouth: Mucous membranes are moist.   Eyes:      Conjunctiva/sclera: Conjunctivae normal.      Pupils: Pupils are equal, round, and reactive to light.   Neck:      Thyroid: No thyromegaly.      Vascular: No JVD.   Cardiovascular:      Rate and Rhythm: Normal rate and regular rhythm.      Heart sounds: Normal heart sounds, S1 normal and S2 normal. No murmur heard.    No friction rub. No gallop.   Pulmonary:      Effort: Pulmonary effort is normal.      Breath sounds: Normal breath sounds.   Abdominal:      General: Abdomen is protuberant. Bowel sounds are normal. There is no distension.      Palpations: Abdomen is soft. There is no mass.      Tenderness: There is no abdominal tenderness.   Musculoskeletal:         General: Normal range of motion.      Cervical back: Full passive range of motion without pain, normal range of motion and neck supple.      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      Left Lower Extremity: Left leg is amputated above knee.   Skin:     General: Skin is warm and dry.      Capillary Refill: Capillary refill takes 2 to 3 seconds.   Neurological:      General: No focal deficit present.      Mental Status: She is alert and oriented to person, place, and time.      Cranial Nerves: No cranial nerve deficit.      Sensory: Sensation is intact.      Motor: Motor function is intact.      Comments: Oriented to person, place and situation, unable to tell birthdate  Converses well, no dysarthria and no aphasia  Moves all extremities well with equal strength   Psychiatric:         Behavior: Behavior normal. Behavior is cooperative.       Significant Labs: All pertinent labs within the past 24 hours have been reviewed.  CBC:   Recent Labs   Lab 08/17/22  0985  08/18/22  0528   WBC 5.86 6.37   HGB 8.7* 8.3*   HCT 26.3* 26.1*    270       CMP:   Recent Labs   Lab 08/17/22  0936 08/18/22  0528   * 136   K 4.8 4.7   * 112*   CO2 14* 16*   * 81   BUN 44* 44*   CREATININE 2.4* 2.0*   CALCIUM 7.9* 8.3*   PROT 5.6* 5.7*   ALBUMIN 1.8* 1.8*   BILITOT 0.2 0.2   ALKPHOS 103 103   AST 9* 13   ALT 6* 9*   ANIONGAP 9 8       TSH:   Recent Labs   Lab 08/13/22  2204   TSH 0.885       Urine Culture:   No results for input(s): LABURIN in the last 48 hours.    Urine Studies:   No results for input(s): COLORU, APPEARANCEUA, PHUR, SPECGRAV, PROTEINUA, GLUCUA, KETONESU, BILIRUBINUA, OCCULTUA, NITRITE, UROBILINOGEN, LEUKOCYTESUR, RBCUA, WBCUA, BACTERIA, SQUAMEPITHEL, HYALINECASTS in the last 48 hours.    Invalid input(s): [facility]    Microbiology Results (last 7 days)       Procedure Component Value Units Date/Time    Urine Culture High Risk [[id number]] Collected: 08/15/22 1650    Order Status: Completed Specimen: Urine Updated: 08/16/22 2221     Urine Culture, Routine No growth    Narrative:      Indicated criteria for high risk culture:->Other  Other (specify):->severe sepsis    Urine culture [[id number]] Collected: 08/13/22 1855    Order Status: Completed Specimen: Urine Updated: 08/15/22 0115     Urine Culture, Routine No growth    Narrative:      Specimen Source->Urine          Significant Imaging:   CT Head (08-10-22):  1. Suspected vasogenic edema in the bilateral parietooccipital lobes which can be seen with PRES.  Recommend clinical correlation and consider further evaluation with MRI.  2. Senescent changes and chronic microvascular ischemic changes.    MRI Brain:  The study is mildly motion degraded.  There is however no acute abnormality and little if any change compared to a prior brain MRI dated 06/02/2021.  The ventricles are mildly prominent but without definite change.  There is no midline shift.  There is at least moderate white matter change.  These  findings however were present on the comparison MRI and without any significant or definite change.  Findings do not strongly suggest PRES.  There is no hemorrhage, mass or acute infarction.    MRA Brain:  Mild irregularity of the left greater than right cavernous internal carotid arteries without significant atherosclerotic narrowing.  No other high-grade stenosis, large vessel occlusion or aneurysm.    MRA Neck:   Mild narrowing at bilateral carotid bifurcations with less than 50% stenosis.  Otherwise, unremarkable MRA of the neck vasculature.    CXR: There are linear regions of atelectasis and bilateral lower lung zones.    CT Head:  There is no evidence acute intracranial process.  There is cerebral atrophy with ventricular dilatation and periventricular white matter changes.  The degree of ventricular dilatation is similar to what was seen on MRI from 08/11/2022.    CXR: Left internal jugular central venous catheter placement.

## 2022-08-19 ENCOUNTER — PATIENT OUTREACH (OUTPATIENT)
Dept: ADMINISTRATIVE | Facility: CLINIC | Age: 57
End: 2022-08-19
Payer: MEDICARE

## 2022-08-19 LAB — PYRIDOXAL SERPL-MCNC: <2 UG/L (ref 5–50)

## 2022-08-19 RX ORDER — FUROSEMIDE 40 MG/1
40 TABLET ORAL DAILY
Qty: 30 TABLET | Refills: 0 | OUTPATIENT
Start: 2022-08-19 | End: 2023-08-19

## 2022-08-19 NOTE — PROGRESS NOTES
C3 nurse attempted to contact Suyapa Connelly for a TCC post hospital discharge follow up call. No answer. No voicemail available. The patient has a scheduled HOSFU appointment with Farzaneh Shaikh on 8/22/22 @ 1100.

## 2022-08-19 NOTE — PROGRESS NOTES
2nd Attempt made to reach patient for TCC call. Left voicemail please call [phone] leave first name, last name, and  for Earnestine to return call.

## 2022-08-19 NOTE — TELEPHONE ENCOUNTER
No new care gaps identified.  [facility] Embedded Care Gaps. Reference number: [id number]. 8/19/2022   2:56:32 PM CDT

## 2022-08-19 NOTE — TELEPHONE ENCOUNTER
----- Message from Rajeshmony Weston sent at 8/19/2022  2:37 PM CDT -----  Contact: self [phone]  Requesting an RX refill or new RX.  Is this a refill or new RX: new  RX name and strength (copy/paste from chart):furosemide tablet 40 mg       Is this a 30 day or 90 day RX:   Pharmacy name and phone # (copy/paste from chart):    [facility] - [place], [place] - [address] [facility] [address] [place] [address] [address]  [facility] [address]  Phone: [phone] Fax: [phone]      The doctors have asked that we provide their patients with the following 2 reminders -- prescription refills can take up to 72 hours, and a friendly reminder that in the future you can use your [facility] account to request refills: yes    Pt out of meds .    Please call and advise

## 2022-08-23 NOTE — PLAN OF CARE
08/23/2022 1500    Called pyridoxine 50mg daily #30 called into pt's pharmacy.  Notified pt's spouse by phone      [id number] - [place] - [address] [facility] [address]  [address]  [facility] [place] [address]  Phone: [phone] Fax: [phone]    [facility] - [place], [place] - [address] [facility] [address]  [address] [place] [address] [address] [facility] [address]  Phone: [phone] Fax: [phone]

## 2022-08-24 ENCOUNTER — TELEPHONE (OUTPATIENT)
Dept: MEDSURG UNIT | Facility: HOSPITAL | Age: 57
End: 2022-08-24
Payer: MEDICARE

## 2022-09-01 LAB — POCT GLUCOSE: 252 MG/DL (ref 70–110)

## 2022-09-08 RX ORDER — FUROSEMIDE 40 MG/1
40 TABLET ORAL DAILY
Qty: 30 TABLET | Refills: 0 | Status: SHIPPED | OUTPATIENT
Start: 2022-09-08 | End: 2022-09-08

## 2022-09-08 NOTE — TELEPHONE ENCOUNTER
----- Message from Kiet Moses sent at 9/8/2022  8:03 AM CDT -----  Contact: [phone]  Requesting an RX refill or new RX.  Is this a refill or new RX: refill  RX name and strength (copy/paste from chart):  furosemide (LASIX) 40 MG tablet  Is this a 30 day or 90 day RX:   Pharmacy name and phone # (copy/paste from chart):    [facility] #[id number] - [address] [address]  Phone: [phone] Fax: [phone]      The doctors have asked that we provide their patients with the following 2 reminders -- prescription refills can take up to 72 hours, and a friendly reminder that in the future you can use your [facility] account to request refills: call back

## 2022-09-08 NOTE — TELEPHONE ENCOUNTER
No new care gaps identified.  [facility] Embedded Care Gaps. Reference number: [id number]. 9/08/2022   8:15:25 AM CDT

## 2022-10-07 ENCOUNTER — OUTPATIENT CASE MANAGEMENT (OUTPATIENT)
Dept: ADMINISTRATIVE | Facility: OTHER | Age: 57
End: 2022-10-07
Payer: MEDICARE

## 2022-10-10 ENCOUNTER — PATIENT MESSAGE (OUTPATIENT)
Dept: ADMINISTRATIVE | Facility: HOSPITAL | Age: 57
End: 2022-10-10
Payer: MEDICARE

## 2022-10-13 ENCOUNTER — PATIENT MESSAGE (OUTPATIENT)
Dept: ADMINISTRATIVE | Facility: OTHER | Age: 57
End: 2022-10-13
Payer: MEDICARE

## 2022-10-18 DIAGNOSIS — E11.65 UNCONTROLLED TYPE 2 DIABETES MELLITUS WITH HYPERGLYCEMIA: ICD-10-CM

## 2022-10-18 DIAGNOSIS — E11.65 TYPE 2 DIABETES MELLITUS WITH HYPERGLYCEMIA, WITH LONG-TERM CURRENT USE OF INSULIN: ICD-10-CM

## 2022-10-18 DIAGNOSIS — Z79.4 TYPE 2 DIABETES MELLITUS WITH HYPERGLYCEMIA, WITH LONG-TERM CURRENT USE OF INSULIN: ICD-10-CM

## 2022-10-18 NOTE — TELEPHONE ENCOUNTER
Please see the attached refill request.    Patient has not been seen in endocrine clinic for more than a year and it seems like somebody may have discontinued some of her insulin during a previous admission so not sure if this refill is appropriate or not. Verito can you please reach out to the patient's if she wants to schedule a follow-up visit with anyone in the endocrine department?      Fawad Jameson MD

## 2022-10-19 RX ORDER — INSULIN GLARGINE 100 [IU]/ML
INJECTION, SOLUTION SUBCUTANEOUS
Qty: 15 ML | OUTPATIENT
Start: 2022-10-19

## 2022-10-29 NOTE — CONSULTS
[facility]  Psychology  Consult Note    Patient Name: Suyapa Connelly  MRN: [id number]   Patient Class: IP- Inpatient  Admission Date: 3/18/2022  Hospital Length of Stay: 4 days  Attending Physician: DAYNE Florez II, MD  Primary Care Provider: Magen Christensen MD    Consults  History of Present Illness: 57 yo F with multiple medical co-morbidities and an extensive past surgical history most significant for prior L AKA and recent R BKA with subsequent revision for poor healing.     Symptoms  · Mood: Depressed mood, anhedonia, feelings of worthlessness. Denied suicidal ideation.  · Anxiety: Mild-moderate anxiety related to worries about health, recovery, functional impairments. Also noted worries that she is a burden to her .  · Pain: Rated current pain as 2-3/10. Denied concerns regarding pain management.  · Appetite: No concerns.  · Sleep: No concerns.    Psychotherapeutics (From admission, onward)            Start     Stop Route Frequency Ordered    03/19/22 1047  amitriptyline tablet 10 mg         -- Oral Nightly PRN 03/19/22 0947    03/19/22 0900  sertraline tablet 50 mg         -- Oral Daily 03/18/22 1647    03/18/22 2100  mirtazapine tablet 15 mg         -- Oral Nightly 03/18/22 1647        Intervention: Engaged patient in Motivational Interviewing intervention focused on recovery from amputation. Elicited description of health values and recovery goals. Patient expressed a strong desire to regain sense of independence and return to regular activities in the house (cleaning, cooking, etc.). Reviewed course of recovery thus far. Elicited description of past examples in which patient effectively coped with medical stress. Provided education regarding psychological adjustment to amputation. Provided info for [employer]. Reviewed benefits of counseling/psychotherapy. Assisted patient in developing a plan to access counseling services.    Mental Status Exam  General Appearance:  unremarkable,  age appropriate, good grooming and hygiene, dressed in hospital gown   Speech: Normal rate, volume, and prosody      Level of Cooperation: cooperative      Thought Processes: normal and logical   Mood: euthymic      Thought Content: normal, no suicidality, no homicidality, delusions, or paranoia   Affect: congruent and appropriate   Orientation: Oriented x 4   Memory: No deficits noted.   Attention & Concentration: intact   Fund of General Knowledge: intact and appropriate to age and level of education   Abstract Reasoning: No deficits noted.   Judgment & Insight: good   Language: intact   Behavioral Observations: Laying with head of bed elevated. Good eye contact. No signs of psychomotor agitation or retardation.       Diagnostic Impression - Plan:     Active Diagnoses:    Diagnosis Date Noted POA    PRINCIPAL PROBLEM:  Infection of below knee amputation stump [T87.40] 03/12/2022 Yes    Uses self-applied continuous glucose monitoring device [Z97.8] 03/20/2022 Unknown    Surgical wound present [T14.8XXA] 03/19/2022 Unknown    Type 2 diabetes mellitus with hyperglycemia, with long-term current use of insulin [E11.65, Z79.4] 01/02/2020 Not Applicable      Problems Resolved During this Admission:     Impression: 57 yo F with multiple medical co-morbidities and an extensive past surgical history most significant for prior L AKA and recent R BKA with subsequent revision for poor healing. Psychology consulted to assess depression. Patient has a history of anxiety and depression. She endorsed moderate anxiety and depressive symptoms. Motivation for recovery and rehab therapies is good, however, patient is doubtful that she will make progress. She described feeling discouraged. Social support is good. No concerns regarding discharge from a mental health standpoint. Overall, patient appears to be coping within normal limits.    Plan: Psychology will continue to follow.     Recommendation: Outpatient follow-up with  Psychology/counseling.       Thank you for the opportunity to participate in this patient's care.    Length of Service: 40 minutes    Zack Walters, PhD  Psychology  [facility]   No

## 2022-11-09 ENCOUNTER — PES CALL (OUTPATIENT)
Dept: ADMINISTRATIVE | Facility: CLINIC | Age: 57
End: 2022-11-09
Payer: MEDICARE

## 2022-11-14 PROBLEM — A41.9 SEVERE SEPSIS: Status: RESOLVED | Noted: 2022-01-17 | Resolved: 2022-11-14

## 2022-11-14 PROBLEM — N39.0 UTI (URINARY TRACT INFECTION): Status: RESOLVED | Noted: 2022-03-15 | Resolved: 2022-11-14

## 2022-11-14 PROBLEM — R65.20 SEVERE SEPSIS: Status: RESOLVED | Noted: 2022-01-17 | Resolved: 2022-11-14

## 2023-02-07 DIAGNOSIS — Z00.00 ENCOUNTER FOR MEDICARE ANNUAL WELLNESS EXAM: ICD-10-CM

## 2023-02-09 DIAGNOSIS — Z00.00 ENCOUNTER FOR MEDICARE ANNUAL WELLNESS EXAM: ICD-10-CM

## 2023-02-24 ENCOUNTER — TELEPHONE (OUTPATIENT)
Dept: FAMILY MEDICINE | Facility: CLINIC | Age: 58
End: 2023-02-24
Payer: MEDICARE

## 2023-02-24 ENCOUNTER — PATIENT MESSAGE (OUTPATIENT)
Dept: FAMILY MEDICINE | Facility: CLINIC | Age: 58
End: 2023-02-24
Payer: MEDICARE

## 2023-03-30 ENCOUNTER — PATIENT OUTREACH (OUTPATIENT)
Dept: ADMINISTRATIVE | Facility: HOSPITAL | Age: 58
End: 2023-03-30
Payer: MEDICARE

## 2023-03-30 DIAGNOSIS — E11.9 TYPE 2 DIABETES MELLITUS WITHOUT COMPLICATION: ICD-10-CM

## 2023-04-04 ENCOUNTER — PATIENT MESSAGE (OUTPATIENT)
Dept: ADMINISTRATIVE | Facility: HOSPITAL | Age: 58
End: 2023-04-04
Payer: MEDICARE

## 2023-04-21 ENCOUNTER — OFFICE VISIT (OUTPATIENT)
Dept: INTERNAL MEDICINE | Facility: CLINIC | Age: 58
End: 2023-04-21
Payer: MEDICARE

## 2023-04-21 VITALS — HEART RATE: 95 BPM | SYSTOLIC BLOOD PRESSURE: 156 MMHG | OXYGEN SATURATION: 99 % | DIASTOLIC BLOOD PRESSURE: 98 MMHG

## 2023-04-21 DIAGNOSIS — Z89.611 STATUS POST ABOVE-KNEE AMPUTATION OF BOTH LOWER EXTREMITIES: ICD-10-CM

## 2023-04-21 DIAGNOSIS — Z89.612 STATUS POST ABOVE-KNEE AMPUTATION OF BOTH LOWER EXTREMITIES: ICD-10-CM

## 2023-04-21 DIAGNOSIS — I48.0 PAROXYSMAL ATRIAL FIBRILLATION: ICD-10-CM

## 2023-04-21 DIAGNOSIS — M79.89 LEG SWELLING: Primary | ICD-10-CM

## 2023-04-21 DIAGNOSIS — N18.4 CKD (CHRONIC KIDNEY DISEASE), STAGE IV: ICD-10-CM

## 2023-04-21 PROBLEM — F33.1 MDD (MAJOR DEPRESSIVE DISORDER), RECURRENT EPISODE, MODERATE: Status: RESOLVED | Noted: 2018-05-07 | Resolved: 2023-04-21

## 2023-04-21 PROBLEM — G91.0 COMMUNICATING HYDROCEPHALUS: Status: RESOLVED | Noted: 2022-08-13 | Resolved: 2023-04-21

## 2023-04-21 PROBLEM — E11.21 TYPE 2 DIABETES MELLITUS WITH DIABETIC NEPHROPATHY, WITH LONG-TERM CURRENT USE OF INSULIN: Status: RESOLVED | Noted: 2021-02-24 | Resolved: 2023-04-21

## 2023-04-21 PROBLEM — Z79.4 TYPE 2 DIABETES MELLITUS WITH DIABETIC NEPHROPATHY, WITH LONG-TERM CURRENT USE OF INSULIN: Status: RESOLVED | Noted: 2021-02-24 | Resolved: 2023-04-21

## 2023-04-21 PROBLEM — E11.621 DIABETIC ULCER OF LEFT FOOT ASSOCIATED WITH TYPE 2 DIABETES MELLITUS, WITH FAT LAYER EXPOSED: Status: RESOLVED | Noted: 2021-08-05 | Resolved: 2023-04-21

## 2023-04-21 PROBLEM — E11.621 DIABETIC ULCER OF RIGHT FOOT ASSOCIATED WITH TYPE 2 DIABETES MELLITUS: Status: RESOLVED | Noted: 2021-12-21 | Resolved: 2023-04-21

## 2023-04-21 PROBLEM — E11.65 TYPE 2 DIABETES MELLITUS WITH HYPERGLYCEMIA, WITH LONG-TERM CURRENT USE OF INSULIN: Status: RESOLVED | Noted: 2020-01-02 | Resolved: 2023-04-21

## 2023-04-21 PROBLEM — F41.1 GAD (GENERALIZED ANXIETY DISORDER): Status: ACTIVE | Noted: 2018-05-07

## 2023-04-21 PROBLEM — F33.1 MDD (MAJOR DEPRESSIVE DISORDER), RECURRENT EPISODE, MODERATE: Status: ACTIVE | Noted: 2018-05-07

## 2023-04-21 PROBLEM — E11.65 UNCONTROLLED TYPE 2 DIABETES MELLITUS WITH HYPERGLYCEMIA: Status: RESOLVED | Noted: 2021-02-24 | Resolved: 2023-04-21

## 2023-04-21 PROBLEM — L97.522 DIABETIC ULCER OF LEFT FOOT ASSOCIATED WITH TYPE 2 DIABETES MELLITUS, WITH FAT LAYER EXPOSED: Status: RESOLVED | Noted: 2021-08-05 | Resolved: 2023-04-21

## 2023-04-21 PROBLEM — E11.622 DIABETIC ULCER OF LEFT LOWER LEG WITH FAT LAYER EXPOSED: Status: RESOLVED | Noted: 2021-08-05 | Resolved: 2023-04-21

## 2023-04-21 PROBLEM — T87.40 INFECTION OF BELOW KNEE AMPUTATION STUMP: Status: RESOLVED | Noted: 2022-03-12 | Resolved: 2023-04-21

## 2023-04-21 PROBLEM — S78.111A: Status: ACTIVE | Noted: 2022-01-29

## 2023-04-21 PROBLEM — L97.519 DIABETIC ULCER OF RIGHT FOOT ASSOCIATED WITH TYPE 2 DIABETES MELLITUS: Status: RESOLVED | Noted: 2021-12-21 | Resolved: 2023-04-21

## 2023-04-21 PROBLEM — Z79.4 TYPE 2 DIABETES MELLITUS WITH HYPERGLYCEMIA, WITH LONG-TERM CURRENT USE OF INSULIN: Status: RESOLVED | Noted: 2020-01-02 | Resolved: 2023-04-21

## 2023-04-21 PROBLEM — L97.922 DIABETIC ULCER OF LEFT LOWER LEG WITH FAT LAYER EXPOSED: Status: RESOLVED | Noted: 2021-08-05 | Resolved: 2023-04-21

## 2023-04-21 PROBLEM — I50.33 ACUTE ON CHRONIC DIASTOLIC CHF (CONGESTIVE HEART FAILURE): Status: RESOLVED | Noted: 2019-12-08 | Resolved: 2023-04-21

## 2023-04-21 PROCEDURE — 99499 UNLISTED E&M SERVICE: CPT | Mod: HCNC,S$GLB,, | Performed by: FAMILY MEDICINE

## 2023-04-21 PROCEDURE — 3080F DIAST BP >= 90 MM HG: CPT | Mod: HCNC,CPTII,S$GLB, | Performed by: FAMILY MEDICINE

## 2023-04-21 PROCEDURE — 4010F ACE/ARB THERAPY RXD/TAKEN: CPT | Mod: HCNC,CPTII,S$GLB, | Performed by: FAMILY MEDICINE

## 2023-04-21 PROCEDURE — 4010F PR ACE/ARB THEARPY RXD/TAKEN: ICD-10-PCS | Mod: HCNC,CPTII,S$GLB, | Performed by: FAMILY MEDICINE

## 2023-04-21 PROCEDURE — 3077F PR MOST RECENT SYSTOLIC BLOOD PRESSURE >= 140 MM HG: ICD-10-PCS | Mod: HCNC,CPTII,S$GLB, | Performed by: FAMILY MEDICINE

## 2023-04-21 PROCEDURE — 99215 OFFICE O/P EST HI 40 MIN: CPT | Mod: HCNC,S$GLB,, | Performed by: FAMILY MEDICINE

## 2023-04-21 PROCEDURE — 99215 PR OFFICE/OUTPT VISIT, EST, LEVL V, 40-54 MIN: ICD-10-PCS | Mod: HCNC,S$GLB,, | Performed by: FAMILY MEDICINE

## 2023-04-21 PROCEDURE — 3080F PR MOST RECENT DIASTOLIC BLOOD PRESSURE >= 90 MM HG: ICD-10-PCS | Mod: HCNC,CPTII,S$GLB, | Performed by: FAMILY MEDICINE

## 2023-04-21 PROCEDURE — 99999 PR PBB SHADOW E&M-EST. PATIENT-LVL IV: CPT | Mod: PBBFAC,HCNC,, | Performed by: FAMILY MEDICINE

## 2023-04-21 PROCEDURE — 99999 PR PBB SHADOW E&M-EST. PATIENT-LVL IV: ICD-10-PCS | Mod: PBBFAC,HCNC,, | Performed by: FAMILY MEDICINE

## 2023-04-21 PROCEDURE — 1159F PR MEDICATION LIST DOCUMENTED IN MEDICAL RECORD: ICD-10-PCS | Mod: HCNC,CPTII,S$GLB, | Performed by: FAMILY MEDICINE

## 2023-04-21 PROCEDURE — 99499 RISK ADDL DX/OHS AUDIT: ICD-10-PCS | Mod: HCNC,S$GLB,, | Performed by: FAMILY MEDICINE

## 2023-04-21 PROCEDURE — 1159F MED LIST DOCD IN RCRD: CPT | Mod: HCNC,CPTII,S$GLB, | Performed by: FAMILY MEDICINE

## 2023-04-21 PROCEDURE — 3077F SYST BP >= 140 MM HG: CPT | Mod: HCNC,CPTII,S$GLB, | Performed by: FAMILY MEDICINE

## 2023-04-21 RX ORDER — HYDROCODONE BITARTRATE AND ACETAMINOPHEN 10; 300 MG/1; MG/1
TABLET ORAL EVERY 6 HOURS PRN
COMMUNITY
End: 2023-04-21

## 2023-04-21 RX ORDER — DOCUSATE SODIUM 100 MG/1
100 CAPSULE, LIQUID FILLED ORAL 2 TIMES DAILY
Status: ON HOLD | COMMUNITY
End: 2023-05-02 | Stop reason: CLARIF

## 2023-04-21 RX ORDER — LOSARTAN POTASSIUM 25 MG/1
12.5 TABLET ORAL NIGHTLY
Status: ON HOLD | COMMUNITY
End: 2023-05-02 | Stop reason: CLARIF

## 2023-04-21 NOTE — PROGRESS NOTES
Subjective:       Patient ID: Suyapa Connelly is a 57 y.o. female.    Chief Complaint: Annual Exam    HPI    Suyapa Connelly is a 57 y.o. female PMHx CKD, DM, HTN, HLD, CAD, S/p Lt AKA and Rt AKA s/t PAD for follow up. Have not seen in >2yrs.    Here w/ . 's mother here as well in wheelchair and h/o dementia.    Coming back to get reestablished. Has not been seen by any doctors since last [facility] encounter which was hospitalization in 8/2022. I have not personally seen her since early 2021.  and patient state that they were living in [place] for a time and now back in [facility], moving multiple times, and having a tough time of it. Running out of pt's meds and not sure what she needs to be on. Only has below meds and running low. She has been feeling more sob, chest pains on occasion, certainly a depressed mood as well which she admits to. More fluid in thighs than she has been used to    Using purewick catheter at home  No signs of ulcers or wounds per , bathing pt twice daily.     Plavix, Lasix, Insulin    Review of Systems   Constitutional:  Negative for chills and fever.   Respiratory:  Positive for shortness of breath.    Cardiovascular:  Positive for chest pain and leg swelling.   Gastrointestinal:  Negative for abdominal pain.   Psychiatric/Behavioral:  Positive for dysphoric mood.        Past Medical History:   Diagnosis Date    Anxiety     Chronic pain syndrome     CKD (chronic kidney disease), stage III     Depression     Diabetes mellitus     type 2    Diabetes mellitus, type 2     GERD (gastroesophageal reflux disease)     Hyperemesis 3/23/2021    Hyperlipemia     Hypertension     Hypokalemia 3/23/2021    Infection of below knee amputation stump 3/12/2022    Myocardial infarction 2010    minor-caused by stress per pt.    Osteomyelitis     Osteomyelitis of left foot 4/30/2021    PVD (peripheral vascular disease)     Ulcer of left foot     Vaginal delivery     x1        Prior  to Admission medications    Medication Sig Start Date End Date Taking? Authorizing Provider   acetaminophen (TYLENOL) 500 MG tablet Take 2 tablets (1,000 mg total) by mouth every 8 (eight) hours as needed for Pain. 6/25/21   Ruma Schulte MD   albuterol (PROVENTIL) 2.5 mg /3 mL (0.083 %) nebulizer solution INHALE 3MLS EVERY 4 HOURS AS NEEDED FOR SHORTNESS OF BREATH FOR UP TO 30 DAYS 5/3/22   Ritika South DO   allopurinoL (ZYLOPRIM) 100 MG tablet Take 1 tablet (100 mg total) by mouth once daily. 7/31/20   Hitesh Mason MD   amitriptyline (ELAVIL) 10 MG tablet Take 1 tablet (10 mg total) by mouth nightly as needed for Pain. 3/22/22 3/22/23  Catrachito Macias MD   aspirin (ECOTRIN) 81 MG EC tablet Take 81 mg by mouth once daily at 6am.    Historical Provider   atorvastatin (LIPITOR) 80 MG tablet TAKE 1 TABLET(80 MG) BY MOUTH EVERY NIGHT 5/3/22   Ritika South DO   blood-glucose meter,continuous (DEXCOM G6 ) Misc Use to check blood sugars 4 times/day 5/17/22   Tasha Loco MD   blood-glucose sensor (DEXCOM G6 SENSOR) Radha Apply one sensor to skin every 10 days 5/17/22   Tasha Loco MD   blood-glucose transmitter (DEXCOM G6 TRANSMITTER) Radha Replace transmitter every 3 months to use with dexcom sensor 5/17/22   Tasha Loco MD   carvediloL (COREG) 3.125 MG tablet TAKE 1 TABLET(3.125 MG) BY MOUTH TWICE DAILY WITH BREAKFAST AND DINNER 5/3/22   Ritika South DO   clopidogreL (PLAVIX) 75 mg tablet TAKE 1 TABLET(75 MG) BY MOUTH DAILY 5/3/22   Ritika South DO   furosemide (LASIX) 40 MG tablet TAKE 1 TABLET(40 MG) BY MOUTH EVERY DAY 3/10/23   Magen Christensen MD   insulin detemir U-100 (LEVEMIR FLEXTOUCH) 100 unit/mL (3 mL) SubQ InPn pen Inject 12 Units into the skin every evening. 1/29/22 1/29/23  Tawnya Montes MD   insulin glargine (LANTUS) 100 unit/mL injection Inject 8 Units into the skin every evening.    Historical Provider   magnesium oxide (MAG-OX) 400 mg (241.3 mg magnesium)  tablet Take 1 tablet (400 mg total) by mouth 2 (two) times daily. 6/25/21   Ruma Schulte MD   melatonin (MELATIN) 3 mg tablet Take 2 tablets (6 mg total) by mouth nightly as needed for Insomnia. 6/25/21   Ruma Schulte MD   multivit/folic acid/vit K1 (ONE-A-DAY WOMEN'S 50 PLUS ORAL) Take 1 tablet by mouth Daily.    Historical Provider   NIFEdipine (PROCARDIA-XL) 30 MG (OSM) 24 hr tablet Take 1 tablet (30 mg total) by mouth once daily. 3/17/22 3/17/23  Hitesh Quijano MD   NOVOLOG FLEXPEN U-100 INSULIN 100 unit/mL (3 mL) InPn pen INJECT 5 UNITS INTO SKIN THREE TIMES DAILY WITH MEALS PLUS SLIDING SCALE FOR MAX DAILY DOSE OF 25 UNITS, HOLD IF NOT EATING  Patient taking differently: 8 Units. INJECT 8 UNITS INTO SKIN THREE TIMES DAILY WITH MEALS PLUS SLIDING SCALE FOR MAX DAILY DOSE OF 25 UNITS, HOLD IF NOT EATING 6/15/22   Fawad Jameson MD   ondansetron (ZOFRAN-ODT) 8 MG TbDL Dissolve 1 tablet (8 mg total) by mouth every 8 (eight) hours as needed. 2/3/22   Tracy Blair MD   oxyCODONE (ROXICODONE) 5 MG immediate release tablet Take 1 tablet (5 mg total) by mouth every 6 (six) hours as needed for Pain. 3/22/22   Catrachito Macias MD   pantoprazole (PROTONIX) 40 MG tablet TAKE 1 TABLET(40 MG) BY MOUTH DAILY 5/3/22   Ritika South DO   pregabalin (LYRICA) 75 MG capsule Take 1 capsule (75 mg total) by mouth 3 (three) times daily. 3/22/22 9/20/22  Catrachito Macias MD   rivaroxaban (XARELTO) 10 mg Tab Take 2.5 mg by mouth nightly.    Historical Provider   sertraline (ZOLOFT) 50 MG tablet TAKE 1 TABLET(50 MG) BY MOUTH DAILY 5/3/22   Ritika South DO   sodium bicarbonate 650 MG tablet Take 2 tablets (1,300 mg total) by mouth 3 (three) times daily. 8/18/22 8/18/23  Caty Otero MD   lancing device Misc 1 Device by Misc.(Non-Drug; Combo Route) route 2 (two) times daily with meals. 5/7/18 2/24/21  Rosales Barton MD        Past medical history, surgical history, and family medical history reviewed and updated as  appropriate.    Medications and allergies reviewed.     Objective:          Vitals:    04/21/23 1515   BP: (!) 156/98   BP Location: Left arm   Patient Position: Sitting   Pulse: 95   SpO2: 99%     There is no height or weight on file to calculate BMI.  Physical Exam  Vitals and nursing note reviewed.   Constitutional:       General: She is not in acute distress.     Appearance: She is not diaphoretic.   Cardiovascular:      Rate and Rhythm: Tachycardia present.      Heart sounds: Murmur heard.   Pulmonary:      Effort: Pulmonary effort is normal. No respiratory distress.      Breath sounds: Normal breath sounds.   Musculoskeletal:      Right lower leg: Edema present.      Left lower leg: Edema present.   Neurological:      Mental Status: She is alert.   Psychiatric:         Mood and Affect: Mood normal.         Behavior: Behavior normal.       Lab Results   Component Value Date    WBC 6.37 08/18/2022    HGB 8.3 (L) 08/18/2022    HCT 26.1 (L) 08/18/2022     08/18/2022    CHOL 266 (H) 12/03/2021    TRIG 167 (H) 12/03/2021    HDL 55 12/03/2021    ALT 9 (L) 08/18/2022    AST 13 08/18/2022     08/18/2022    K 4.7 08/18/2022     (H) 08/18/2022    CREATININE 2.0 (H) 08/18/2022    BUN 44 (H) 08/18/2022    CO2 16 (L) 08/18/2022    TSH 0.885 08/13/2022    INR 1.1 03/15/2022    HGBA1C 6.9 (H) 03/26/2022       Assessment:       1. Leg swelling    2. CKD (chronic kidney disease), stage IV    3. Paroxysmal atrial fibrillation    4. Status post above-knee amputation of both lower extremities          Plan:   1. Leg swelling  -     Refer to Emergency Dept.    2. CKD (chronic kidney disease), stage IV  -     Refer to Emergency Dept.    3. Paroxysmal atrial fibrillation    4. Status post above-knee amputation of both lower extremities        Not unstable necessarily. My main worry is not recent encounters with any medical team. Social issues certainly. Would be ideal to get labs done, potentially catheter and iv  diuretics for fluids buildup and get on intial and stable regimen per hospital team and specialists if necessary and then back into outpt world for follow up. Not sure she would follow up strictly outpt if tried to place referrals and ordered for labs myself. This is because of past history. Pt and  voiced themselves that want to go to ED and I agree that this would be best given what is going on.    No follow-ups on file.    Magen Christensen MD  Family Medicine / Primary Care  [facility]  4/21/2023

## 2023-04-25 ENCOUNTER — PATIENT MESSAGE (OUTPATIENT)
Dept: ADMINISTRATIVE | Facility: HOSPITAL | Age: 58
End: 2023-04-25
Payer: MEDICARE

## 2023-04-26 ENCOUNTER — PATIENT MESSAGE (OUTPATIENT)
Dept: ADMINISTRATIVE | Facility: HOSPITAL | Age: 58
End: 2023-04-26
Payer: MEDICARE

## 2023-05-01 ENCOUNTER — HOSPITAL ENCOUNTER (INPATIENT)
Facility: HOSPITAL | Age: 58
LOS: 26 days | Discharge: HOME HEALTH | DRG: 286 | End: 2023-05-29
Attending: EMERGENCY MEDICINE | Admitting: EMERGENCY MEDICINE
Payer: MEDICARE

## 2023-05-01 DIAGNOSIS — E88.09 HYPOALBUMINEMIA: ICD-10-CM

## 2023-05-01 DIAGNOSIS — I25.10 CORONARY ARTERY DISEASE, UNSPECIFIED VESSEL OR LESION TYPE, UNSPECIFIED WHETHER ANGINA PRESENT, UNSPECIFIED WHETHER NATIVE OR TRANSPLANTED HEART: ICD-10-CM

## 2023-05-01 DIAGNOSIS — I50.9 CHF (CONGESTIVE HEART FAILURE): ICD-10-CM

## 2023-05-01 DIAGNOSIS — S78.111A ABOVE-KNEE AMPUTATION OF RIGHT LOWER EXTREMITY: ICD-10-CM

## 2023-05-01 DIAGNOSIS — I50.43 ACUTE ON CHRONIC COMBINED SYSTOLIC AND DIASTOLIC HEART FAILURE: ICD-10-CM

## 2023-05-01 DIAGNOSIS — E83.9 CHRONIC KIDNEY DISEASE-MINERAL AND BONE DISORDER: ICD-10-CM

## 2023-05-01 DIAGNOSIS — M79.2 PERIPHERAL NEUROPATHIC PAIN: ICD-10-CM

## 2023-05-01 DIAGNOSIS — J96.01 ACUTE HYPOXEMIC RESPIRATORY FAILURE: ICD-10-CM

## 2023-05-01 DIAGNOSIS — N18.4 CKD (CHRONIC KIDNEY DISEASE), STAGE IV: ICD-10-CM

## 2023-05-01 DIAGNOSIS — N17.9 AKI (ACUTE KIDNEY INJURY): ICD-10-CM

## 2023-05-01 DIAGNOSIS — I50.42 CHRONIC COMBINED SYSTOLIC AND DIASTOLIC HEART FAILURE: ICD-10-CM

## 2023-05-01 DIAGNOSIS — F33.0 MDD (MAJOR DEPRESSIVE DISORDER), RECURRENT EPISODE, MILD: ICD-10-CM

## 2023-05-01 DIAGNOSIS — N19 UREMIA: ICD-10-CM

## 2023-05-01 DIAGNOSIS — I10 ESSENTIAL HYPERTENSION: ICD-10-CM

## 2023-05-01 DIAGNOSIS — Z99.2 DEPENDENT ON HEMODIALYSIS: ICD-10-CM

## 2023-05-01 DIAGNOSIS — R53.81 DEBILITY: ICD-10-CM

## 2023-05-01 DIAGNOSIS — Z95.1 S/P CABG X 1: ICD-10-CM

## 2023-05-01 DIAGNOSIS — F41.1 GAD (GENERALIZED ANXIETY DISORDER): ICD-10-CM

## 2023-05-01 DIAGNOSIS — E83.51 HYPOCALCEMIA: ICD-10-CM

## 2023-05-01 DIAGNOSIS — Z89.611 STATUS POST ABOVE-KNEE AMPUTATION OF BOTH LOWER EXTREMITIES: ICD-10-CM

## 2023-05-01 DIAGNOSIS — F43.22 ADJUSTMENT DISORDER WITH ANXIETY: ICD-10-CM

## 2023-05-01 DIAGNOSIS — Z74.09 IMPAIRED FUNCTIONAL MOBILITY AND ENDURANCE: ICD-10-CM

## 2023-05-01 DIAGNOSIS — R07.9 CHEST PAIN: ICD-10-CM

## 2023-05-01 DIAGNOSIS — M79.89 LEG SWELLING: ICD-10-CM

## 2023-05-01 DIAGNOSIS — I48.0 PAROXYSMAL ATRIAL FIBRILLATION: ICD-10-CM

## 2023-05-01 DIAGNOSIS — I50.9 CONGESTIVE HEART FAILURE, UNSPECIFIED HF CHRONICITY, UNSPECIFIED HEART FAILURE TYPE: ICD-10-CM

## 2023-05-01 DIAGNOSIS — M89.9 CHRONIC KIDNEY DISEASE-MINERAL AND BONE DISORDER: ICD-10-CM

## 2023-05-01 DIAGNOSIS — Z89.612 STATUS POST ABOVE KNEE AMPUTATION, LEFT: Primary | ICD-10-CM

## 2023-05-01 DIAGNOSIS — N18.9 CHRONIC KIDNEY DISEASE-MINERAL AND BONE DISORDER: ICD-10-CM

## 2023-05-01 DIAGNOSIS — Z89.612 STATUS POST ABOVE-KNEE AMPUTATION OF BOTH LOWER EXTREMITIES: ICD-10-CM

## 2023-05-01 LAB
ALBUMIN SERPL BCP-MCNC: 2.1 G/DL (ref 3.5–5.2)
ALBUMIN SERPL BCP-MCNC: 2.2 G/DL (ref 3.5–5.2)
ALP SERPL-CCNC: 233 U/L (ref 55–135)
ALP SERPL-CCNC: 253 U/L (ref 55–135)
ALT SERPL W/O P-5'-P-CCNC: 42 U/L (ref 10–44)
ALT SERPL W/O P-5'-P-CCNC: 47 U/L (ref 10–44)
ANION GAP SERPL CALC-SCNC: 10 MMOL/L (ref 8–16)
ANION GAP SERPL CALC-SCNC: 12 MMOL/L (ref 8–16)
AST SERPL-CCNC: 42 U/L (ref 10–40)
AST SERPL-CCNC: 45 U/L (ref 10–40)
BASOPHILS # BLD AUTO: 0.07 K/UL (ref 0–0.2)
BASOPHILS NFR BLD: 1.4 % (ref 0–1.9)
BILIRUB SERPL-MCNC: 0.3 MG/DL (ref 0.1–1)
BILIRUB SERPL-MCNC: 0.3 MG/DL (ref 0.1–1)
BNP SERPL-MCNC: 1063 PG/ML (ref 0–99)
BUN SERPL-MCNC: 57 MG/DL (ref 6–20)
BUN SERPL-MCNC: 58 MG/DL (ref 6–20)
CALCIUM SERPL-MCNC: 6.9 MG/DL (ref 8.7–10.5)
CALCIUM SERPL-MCNC: 7.3 MG/DL (ref 8.7–10.5)
CHLORIDE SERPL-SCNC: 109 MMOL/L (ref 95–110)
CHLORIDE SERPL-SCNC: 110 MMOL/L (ref 95–110)
CO2 SERPL-SCNC: 18 MMOL/L (ref 23–29)
CO2 SERPL-SCNC: 19 MMOL/L (ref 23–29)
CREAT SERPL-MCNC: 3.9 MG/DL (ref 0.5–1.4)
CREAT SERPL-MCNC: 4 MG/DL (ref 0.5–1.4)
DIFFERENTIAL METHOD: ABNORMAL
EOSINOPHIL # BLD AUTO: 0.1 K/UL (ref 0–0.5)
EOSINOPHIL NFR BLD: 2.7 % (ref 0–8)
ERYTHROCYTE [DISTWIDTH] IN BLOOD BY AUTOMATED COUNT: 15.6 % (ref 11.5–14.5)
EST. GFR  (NO RACE VARIABLE): 12.5 ML/MIN/1.73 M^2
EST. GFR  (NO RACE VARIABLE): 12.9 ML/MIN/1.73 M^2
GLUCOSE SERPL-MCNC: 169 MG/DL (ref 70–110)
GLUCOSE SERPL-MCNC: 174 MG/DL (ref 70–110)
HCT VFR BLD AUTO: 32.4 % (ref 37–48.5)
HCV AB SERPL QL IA: NORMAL
HGB BLD-MCNC: 9.9 G/DL (ref 12–16)
HIV 1+2 AB+HIV1 P24 AG SERPL QL IA: NORMAL
IMM GRANULOCYTES # BLD AUTO: 0.01 K/UL (ref 0–0.04)
IMM GRANULOCYTES NFR BLD AUTO: 0.2 % (ref 0–0.5)
LYMPHOCYTES # BLD AUTO: 0.9 K/UL (ref 1–4.8)
LYMPHOCYTES NFR BLD: 17 % (ref 18–48)
MCH RBC QN AUTO: 30.6 PG (ref 27–31)
MCHC RBC AUTO-ENTMCNC: 30.6 G/DL (ref 32–36)
MCV RBC AUTO: 100 FL (ref 82–98)
MONOCYTES # BLD AUTO: 0.6 K/UL (ref 0.3–1)
MONOCYTES NFR BLD: 12 % (ref 4–15)
NEUTROPHILS # BLD AUTO: 3.5 K/UL (ref 1.8–7.7)
NEUTROPHILS NFR BLD: 66.7 % (ref 38–73)
NRBC BLD-RTO: 0 /100 WBC
PLATELET # BLD AUTO: 304 K/UL (ref 150–450)
PMV BLD AUTO: 10.7 FL (ref 9.2–12.9)
POC CARDIAC TROPONIN I: 0.03 NG/ML (ref 0–0.08)
POC CARDIAC TROPONIN I: 0.04 NG/ML (ref 0–0.08)
POCT GLUCOSE: 146 MG/DL (ref 70–110)
POCT GLUCOSE: 173 MG/DL (ref 70–110)
POTASSIUM SERPL-SCNC: 4.2 MMOL/L (ref 3.5–5.1)
POTASSIUM SERPL-SCNC: 4.2 MMOL/L (ref 3.5–5.1)
PROT SERPL-MCNC: 6.1 G/DL (ref 6–8.4)
PROT SERPL-MCNC: 6.6 G/DL (ref 6–8.4)
RBC # BLD AUTO: 3.24 M/UL (ref 4–5.4)
SAMPLE: NORMAL
SAMPLE: NORMAL
SODIUM SERPL-SCNC: 138 MMOL/L (ref 136–145)
SODIUM SERPL-SCNC: 140 MMOL/L (ref 136–145)
TROPONIN I SERPL DL<=0.01 NG/ML-MCNC: 0.05 NG/ML (ref 0–0.03)
TROPONIN I SERPL DL<=0.01 NG/ML-MCNC: 0.05 NG/ML (ref 0–0.03)
WBC # BLD AUTO: 5.18 K/UL (ref 3.9–12.7)

## 2023-05-01 PROCEDURE — 80053 COMPREHEN METABOLIC PANEL: CPT | Mod: 91,HCNC | Performed by: STUDENT IN AN ORGANIZED HEALTH CARE EDUCATION/TRAINING PROGRAM

## 2023-05-01 PROCEDURE — 84484 ASSAY OF TROPONIN QUANT: CPT | Mod: 91,HCNC | Performed by: EMERGENCY MEDICINE

## 2023-05-01 PROCEDURE — 99285 EMERGENCY DEPT VISIT HI MDM: CPT | Mod: 25

## 2023-05-01 PROCEDURE — 83880 ASSAY OF NATRIURETIC PEPTIDE: CPT | Mod: HCNC | Performed by: EMERGENCY MEDICINE

## 2023-05-01 PROCEDURE — 25000003 PHARM REV CODE 250: Mod: HCNC

## 2023-05-01 PROCEDURE — 36415 COLL VENOUS BLD VENIPUNCTURE: CPT | Mod: HCNC | Performed by: STUDENT IN AN ORGANIZED HEALTH CARE EDUCATION/TRAINING PROGRAM

## 2023-05-01 PROCEDURE — 93005 ELECTROCARDIOGRAM TRACING: CPT | Mod: HCNC

## 2023-05-01 PROCEDURE — 25000003 PHARM REV CODE 250: Mod: HCNC | Performed by: STUDENT IN AN ORGANIZED HEALTH CARE EDUCATION/TRAINING PROGRAM

## 2023-05-01 PROCEDURE — 96372 THER/PROPH/DIAG INJ SC/IM: CPT | Performed by: STUDENT IN AN ORGANIZED HEALTH CARE EDUCATION/TRAINING PROGRAM

## 2023-05-01 PROCEDURE — 63600175 PHARM REV CODE 636 W HCPCS: Mod: HCNC

## 2023-05-01 PROCEDURE — 99285 PR EMERGENCY DEPT VISIT,LEVEL V: ICD-10-PCS | Mod: ,,, | Performed by: EMERGENCY MEDICINE

## 2023-05-01 PROCEDURE — 86803 HEPATITIS C AB TEST: CPT | Mod: HCNC | Performed by: PHYSICIAN ASSISTANT

## 2023-05-01 PROCEDURE — 93010 ELECTROCARDIOGRAM REPORT: CPT | Mod: ,,, | Performed by: INTERNAL MEDICINE

## 2023-05-01 PROCEDURE — G0378 HOSPITAL OBSERVATION PER HR: HCPCS | Mod: HCNC

## 2023-05-01 PROCEDURE — 87389 HIV-1 AG W/HIV-1&-2 AB AG IA: CPT | Mod: HCNC | Performed by: PHYSICIAN ASSISTANT

## 2023-05-01 PROCEDURE — 99285 EMERGENCY DEPT VISIT HI MDM: CPT | Mod: ,,, | Performed by: EMERGENCY MEDICINE

## 2023-05-01 PROCEDURE — 94761 N-INVAS EAR/PLS OXIMETRY MLT: CPT | Mod: HCNC

## 2023-05-01 PROCEDURE — 80053 COMPREHEN METABOLIC PANEL: CPT | Mod: HCNC | Performed by: EMERGENCY MEDICINE

## 2023-05-01 PROCEDURE — 85025 COMPLETE CBC W/AUTO DIFF WBC: CPT | Mod: HCNC | Performed by: EMERGENCY MEDICINE

## 2023-05-01 PROCEDURE — 93010 EKG 12-LEAD: ICD-10-PCS | Mod: ,,, | Performed by: INTERNAL MEDICINE

## 2023-05-01 PROCEDURE — 96374 THER/PROPH/DIAG INJ IV PUSH: CPT

## 2023-05-01 RX ORDER — SODIUM BICARBONATE 650 MG/1
1300 TABLET ORAL 3 TIMES DAILY
Status: DISCONTINUED | OUTPATIENT
Start: 2023-05-01 | End: 2023-05-11

## 2023-05-01 RX ORDER — CLOPIDOGREL BISULFATE 75 MG/1
75 TABLET ORAL DAILY
Status: DISCONTINUED | OUTPATIENT
Start: 2023-05-02 | End: 2023-05-21

## 2023-05-01 RX ORDER — SERTRALINE HYDROCHLORIDE 50 MG/1
50 TABLET, FILM COATED ORAL DAILY
Status: DISCONTINUED | OUTPATIENT
Start: 2023-05-02 | End: 2023-05-29 | Stop reason: HOSPADM

## 2023-05-01 RX ORDER — DEXTROSE 40 %
15 GEL (GRAM) ORAL
Status: DISCONTINUED | OUTPATIENT
Start: 2023-05-01 | End: 2023-05-29 | Stop reason: HOSPADM

## 2023-05-01 RX ORDER — INSULIN ASPART 100 [IU]/ML
3 INJECTION, SOLUTION INTRAVENOUS; SUBCUTANEOUS
Status: DISCONTINUED | OUTPATIENT
Start: 2023-05-02 | End: 2023-05-12

## 2023-05-01 RX ORDER — IBUPROFEN 200 MG
24 TABLET ORAL
Status: DISCONTINUED | OUTPATIENT
Start: 2023-05-01 | End: 2023-05-01 | Stop reason: SDUPTHER

## 2023-05-01 RX ORDER — PANTOPRAZOLE SODIUM 40 MG/1
40 TABLET, DELAYED RELEASE ORAL DAILY
Status: DISCONTINUED | OUTPATIENT
Start: 2023-05-02 | End: 2023-05-18

## 2023-05-01 RX ORDER — DEXTROSE 40 %
15 GEL (GRAM) ORAL
Status: DISCONTINUED | OUTPATIENT
Start: 2023-05-01 | End: 2023-05-02

## 2023-05-01 RX ORDER — ATORVASTATIN CALCIUM 40 MG/1
80 TABLET, FILM COATED ORAL DAILY
Status: DISCONTINUED | OUTPATIENT
Start: 2023-05-02 | End: 2023-05-29 | Stop reason: HOSPADM

## 2023-05-01 RX ORDER — DOCUSATE SODIUM 100 MG/1
100 CAPSULE, LIQUID FILLED ORAL 2 TIMES DAILY
Status: DISCONTINUED | OUTPATIENT
Start: 2023-05-01 | End: 2023-05-15

## 2023-05-01 RX ORDER — SODIUM CHLORIDE 0.9 % (FLUSH) 0.9 %
10 SYRINGE (ML) INJECTION EVERY 12 HOURS PRN
Status: DISCONTINUED | OUTPATIENT
Start: 2023-05-01 | End: 2023-05-24

## 2023-05-01 RX ORDER — FUROSEMIDE 10 MG/ML
40 INJECTION INTRAMUSCULAR; INTRAVENOUS
Status: DISCONTINUED | OUTPATIENT
Start: 2023-05-02 | End: 2023-05-02

## 2023-05-01 RX ORDER — CALCIUM CARBONATE 200(500)MG
500 TABLET,CHEWABLE ORAL
Status: COMPLETED | OUTPATIENT
Start: 2023-05-01 | End: 2023-05-01

## 2023-05-01 RX ORDER — NALOXONE HCL 0.4 MG/ML
0.02 VIAL (ML) INJECTION
Status: DISCONTINUED | OUTPATIENT
Start: 2023-05-01 | End: 2023-05-29 | Stop reason: HOSPADM

## 2023-05-01 RX ORDER — GLUCAGON 1 MG
1 KIT INJECTION
Status: DISCONTINUED | OUTPATIENT
Start: 2023-05-01 | End: 2023-05-02

## 2023-05-01 RX ORDER — CARVEDILOL 3.12 MG/1
3.12 TABLET ORAL 2 TIMES DAILY WITH MEALS
Status: DISCONTINUED | OUTPATIENT
Start: 2023-05-01 | End: 2023-05-11

## 2023-05-01 RX ORDER — INSULIN ASPART 100 [IU]/ML
0-5 INJECTION, SOLUTION INTRAVENOUS; SUBCUTANEOUS
Status: DISCONTINUED | OUTPATIENT
Start: 2023-05-01 | End: 2023-05-29 | Stop reason: HOSPADM

## 2023-05-01 RX ORDER — GLUCAGON 1 MG
1 KIT INJECTION
Status: DISCONTINUED | OUTPATIENT
Start: 2023-05-01 | End: 2023-05-29 | Stop reason: HOSPADM

## 2023-05-01 RX ORDER — DEXTROSE 40 %
30 GEL (GRAM) ORAL
Status: DISCONTINUED | OUTPATIENT
Start: 2023-05-01 | End: 2023-05-29 | Stop reason: HOSPADM

## 2023-05-01 RX ORDER — FUROSEMIDE 10 MG/ML
80 INJECTION INTRAMUSCULAR; INTRAVENOUS
Status: COMPLETED | OUTPATIENT
Start: 2023-05-01 | End: 2023-05-01

## 2023-05-01 RX ORDER — ASPIRIN 81 MG/1
81 TABLET ORAL DAILY
Status: DISCONTINUED | OUTPATIENT
Start: 2023-05-02 | End: 2023-05-11

## 2023-05-01 RX ADMIN — CALCIUM CARBONATE (ANTACID) CHEW TAB 500 MG 500 MG: 500 CHEW TAB at 05:05

## 2023-05-01 RX ADMIN — RIVAROXABAN 2.5 MG: 2.5 TABLET, FILM COATED ORAL at 09:05

## 2023-05-01 RX ADMIN — SODIUM BICARBONATE 1300 MG: 650 TABLET ORAL at 09:05

## 2023-05-01 RX ADMIN — FUROSEMIDE 80 MG: 10 INJECTION, SOLUTION INTRAMUSCULAR; INTRAVENOUS at 05:05

## 2023-05-01 RX ADMIN — CARVEDILOL 3.12 MG: 3.12 TABLET, FILM COATED ORAL at 09:05

## 2023-05-01 RX ADMIN — DOCUSATE SODIUM 100 MG: 100 CAPSULE, LIQUID FILLED ORAL at 09:05

## 2023-05-01 RX ADMIN — INSULIN DETEMIR 8 UNITS: 100 INJECTION, SOLUTION SUBCUTANEOUS at 09:05

## 2023-05-01 NOTE — PROVIDER PROGRESS NOTES - EMERGENCY DEPT.
Medical Screening Exam Completed    HPI:   Hx CAD s/p CBG, bilateral LE amputations. Here with worsening SOB, concern for fluid retention/anasarca, and 9/10 chest pain.     Vitals:    10/27/19 2247   BP: 132/70   Pulse: 80   Resp: 16   Temp: 97.6 °F (36.4 °C)     Focused Exam:   VSS. NAD. Conversant. Normal WOB.     Plan:   EKG, labs, CXR.     Further management per primary team.     Janessa Kirkland MD  Emergency Medicine

## 2023-05-01 NOTE — ASSESSMENT & PLAN NOTE
History of CAD with retrosternal chest pain. EKG negative to ST changes. Nonspecific T wave inversions.     -Cardiac tele  -EKG prn  -Trend troponins to peak

## 2023-05-01 NOTE — Clinical Note
The neck was prepped. The site was prepped with ChloraPrep. The patient was draped. The patient was positioned supine.

## 2023-05-01 NOTE — ASSESSMENT & PLAN NOTE
Last Echo 6/21 showing     The estimated ejection fraction is 55%.   The left ventricle is normal in size with normal systolic function.   Normal left ventricular diastolic function.   Normal right ventricular size with normal right ventricular systolic function.   Mild tricuspid regurgitation.   The estimated PA systolic pressure is 31 mmHg.   Normal central venous pressure (3 mmHg).       -Repeat Echo Cardiogram  -Trend troponins to peak  -Aggressive IV Diuresis  -Qq4 BMPs with electrolyte repletion as needed

## 2023-05-01 NOTE — PROVIDER PROGRESS NOTES - EMERGENCY DEPT.
Encounter Date: 5/1/2023    ED Physician Progress Notes         EKG - STEMI Decision  Initial Reading: No STEMI present.

## 2023-05-01 NOTE — HPI
57 y.o. female with past medical history of CAD s/p CABG, CKD, DM, HTN, HLD, s/p bilateral AKA who presnets with chest pain, difficulty breathing, leg swelling.     Per ED She has been unable to access any of her medications for the last 3 months. Over the last few months she has noted worsening shortness of breath, particulatly orthopnea, and leg swelling. In the last two weeks she endorses associated abdominal pain and swelling, reduced appetite, and pleuritic chest pain with deep inspiration. She endorses occasional lighthededness with changes in position. Last week she had an episode where she was unable to breathe for about two minutes which was very upsetting for her and her , but she recovered and did not seek care at that time. Today, she complains of significant difficulty breathing, swelling in her legs and abdomen, and reduced appetite.     No headaches, visual changes, URI symptoms, palpitations, nausea, vomiting, diarrhea, blood in her stool, dysuria, hematuria, numbness or weakness in her extremities. She does endorse occasional paresthesias associated with her chronic phantom limb pain. She additionally has had increased difficulties with depression recently, denies suicidal or homicidal ideation, but has had considerable stress and low mood.      Of note, the patient saw a new primary care provider 4/21 for similar concerns who recommended she proceed to the ED at that time, but they were unable due to financial and geographic concerns. She lives two hours away from [facility] and her  cares for her and elderly mother with dementia. She has had decreased access to medical care over the last year, and unable to access most of her prescriptions. She had a 90 day supply of four medications (lasix, statin, clopidogrel, sertraline) but these were accidentally lost and she has been unable to afford a replacement. They present today for evaluation and to reestablish care and access to her  prescriptions.     Labs on admission significant for an elevated BNP, hypocalcemia which corrected is 8.3, significant elevation in Creatinine and a slight elevation of troponin    CXR  Cardiomegaly with pulmonary interstitial edema, suggestive of pulmonary edema secondary to CHF.

## 2023-05-01 NOTE — MEDICAL/APP STUDENT
[facility] Medicine Student   History and Physical  [facility]  05/01/2023  6:13 PM    SUBJECTIVE:     Chief Complaint:  Chest pain    History of Present Illness:  Ms. Suyapa Connelly is a 57 y.o. female with a relevant medical history of  CAD s/p CABG, CKD, DM, HTN, HLD, s/p bilateral AKA who presents with chest pain, difficulty breathing, leg swelling. Brought in by  who claims patient has not had access to her meds for 6 months,  has been giving her his meds. Her current chest pain is left-sided and is nonradiating.  For the past 3 weeks, she has had increased swelling up to her chest and a dry cough. She has also had nausea and vomiting for past 3 weeks that occurs after eating, she eats 1 meal per day. She has R sided breast and abdominal pain, which is likely due to fluid accumulation. She normally sleeps with 2 pillows but has had to sit up multiple times throughout the night due to SOB. Given 1 dose of Lasix 80 mg IV and calcium carbonate 500 mg po in the ED.     History is positive for orthopnea and PND. Her last BM was yesterday and is regular. No headache, visual changes, or weakness. Tachycardic and hypertensive on admission.      Review of Systems   Constitutional: Negative.  Negative for fever.   Eyes:  Negative for blurred vision and double vision.   Respiratory:  Positive for cough and shortness of breath.    Cardiovascular:  Positive for chest pain, orthopnea, leg swelling and PND.   Gastrointestinal:  Positive for abdominal pain, nausea and vomiting. Negative for constipation and diarrhea.   Neurological:  Positive for headaches. Negative for weakness.       HISTORY:     Past Medical History:   Diagnosis Date    Anxiety     Chronic pain syndrome     CKD (chronic kidney disease), stage III     Depression     Diabetes mellitus     type 2    Diabetes mellitus, type 2     GERD (gastroesophageal reflux disease)     Hyperemesis 3/23/2021    Hyperlipemia     Hypertension     Hypokalemia  3/23/2021    Infection of below knee amputation stump 3/12/2022    Myocardial infarction 2010    minor-caused by stress per pt.    Osteomyelitis     Osteomyelitis of left foot 4/30/2021    PVD (peripheral vascular disease)     Ulcer of left foot     Vaginal delivery     x1       Past Surgical History:   Procedure Laterality Date    ABOVE-KNEE AMPUTATION Left 5/18/2021    Procedure: AMPUTATION, ABOVE KNEE;  Surgeon: Teddy Huber MD;  Location: [address];  Service: Vascular;  Laterality: Left;    ABOVE-KNEE AMPUTATION Right 3/18/2022    Procedure: AMPUTATION, ABOVE KNEE;  Surgeon: ADYNE Florez II, MD;  Location: [facility] OR [address] [place] [address];  Service: Vascular;  Laterality: Right;    Angiogram - Right Extremity Right 7/9/15    angiogram-left leg  10/6/15    ANGIOGRAPHY OF LOWER EXTREMITY Left 4/29/2021    Procedure: ANGIOGRAM, LOWER EXTREMITY;  Surgeon: Teddy Huber MD;  Location: [facility] OR [address] [place] [address];  Service: Vascular;  Laterality: Left;    BELOW KNEE AMPUTATION OF LOWER EXTREMITY Right 12/28/2021    Procedure: AMPUTATION, BELOW KNEE;  Surgeon: Kaitlyn Rojas MD;  Location: [facility] OR;  Service: General;  Laterality: Right;    CATHETERIZATION OF BOTH LEFT AND RIGHT HEART N/A 12/18/2019    Procedure: CATHETERIZATION, HEART, BOTH LEFT AND RIGHT;  Surgeon: Que Fernando III, MD;  Location: [facility] CATH LAB;  Service: Cardiology;  Laterality: N/A;    CORONARY ANGIOGRAPHY N/A 12/18/2019    Procedure: ANGIOGRAM, CORONARY ARTERY;  Surgeon: Que Fernando III, MD;  Location: [facility] CATH LAB;  Service: Cardiology;  Laterality: N/A;    CORONARY ANGIOGRAPHY INCLUDING BYPASS GRAFTS WITH CATHETERIZATION OF LEFT HEART N/A 7/28/2020    Procedure: ANGIOGRAM, CORONARY, INCLUDING BYPASS GRAFT, WITH LEFT HEART CATHETERIZATION, 9 am;  Surgeon: Rachel Easley MD;  Location: [facility] CATH LAB;  Service: Cardiology;  Laterality: N/A;    CORONARY ARTERY BYPASS GRAFT (CABG) N/A 1/14/2020    Procedure: CORONARY ARTERY BYPASS  GRAFT (CABG) x 1     Off Pump;  Surgeon: Huagn Altamirano MD;  Location: [address];  Service: Cardiovascular;  Laterality: N/A;    CREATION OF FEMORAL-TIBIAL ARTERY BYPASS Left 4/29/2021    Procedure: CREATION, BYPASS, ARTERIAL, FEMORAL TO ANTERIOR TIBIAL;  Surgeon: Teddy Huber MD;  Location: [address];  Service: Vascular;  Laterality: Left;    CREATION OF FEMOROPOPLITEAL ARTERIAL BYPASS USING GRAFT Left 8/18/2020    Procedure: CREATION, BYPASS, ARTERIAL, FEMORAL TO POPLITEAL, USING GRAFT, LEFT LOWER EXTREMITY;  Surgeon: Teddy Huber MD;  Location: [facility];  Service: Vascular;  Laterality: Left;  REQUEST 7:15 A.M. START----COVID NEGATIVE ON 8/17 1ST CASE STARTE PER LEANA ON 8/7/2020 @ 942AM-  RN PREOP 8/12/2020   T/S-----CLEARED BY CARDS-------PENDING INSURANCE    DEBRIDEMENT OF FOOT Left 9/8/2020    Procedure: DEBRIDEMENT, FOOT;  Surgeon: Rosio Mayes DPM;  Location: [facility];  Service: Podiatry;  Laterality: Left;  request neoxx .   RN Pre Op 9-4-2020, Covid negative on 9/5/20. C A    DEBRIDEMENT OF FOOT  3/4/2021    Procedure: DEBRIDEMENT, FOOT;  Surgeon: Teddy Huber MD;  Location: [facility] OR;  Service: Vascular;;    DEBRIDEMENT OF FOOT Left 3/9/2021    Procedure: DEBRIDEMENT, FOOT, bone biopsy;  Surgeon: Rosio Mayes DPM;  Location: [facility] OR;  Service: Podiatry;  Laterality: Left;  Request neoxx---COVID IN AM  REQUESTING NOON START  RN Phone Pre op.On Blood thinners Plavix and Eliquis.  Covid am of surgery. C A    DEBRIDEMENT OF FOOT Left 5/4/2021    Procedure: DEBRIDEMENT, FOOT;  Surgeon: Farooq Morley DPM;  Location: [address];  Service: Podiatry;  Laterality: Left;    INSERTION OF TUNNELED CENTRAL VENOUS HEMODIALYSIS CATHETER N/A 1/27/2020    Procedure: Insertion, Catheter, Central Venous, Hemodialysis;  Surgeon: ESTEBAN Gomez III, MD;  Location: [facility] CATH LAB;  Service: Peripheral Vascular;  Laterality: N/A;    PERCUTANEOUS TRANSLUMINAL ANGIOPLASTY  N/A 3/4/2021    Procedure: PTA (ANGIOPLASTY, PERCUTANEOUS, TRANSLUMINAL);  Surgeon: Teddy Huber MD;  Location: [facility] OR;  Service: Vascular;  Laterality: N/A;    REMOVAL OF ARTERIOVENOUS GRAFT Left 5/27/2021    Procedure: REMOVAL, GRAFT, LEFT LOWER EXTREMITY, WOUND EXPLORATION;  Surgeon: Teddy Huber MD;  Location: [facility] OR 2ND FLR;  Service: Vascular;  Laterality: Left;    REMOVAL OF NAIL OF DIGIT Left 3/9/2021    Procedure: REMOVAL, NAIL, DIGIT;  Surgeon: Rosio Mayes DPM;  Location: [facility] OR;  Service: Podiatry;  Laterality: Left;    THROMBECTOMY Left 3/4/2021    Procedure: THROMBECTOMY, LEFT LOWER EXTREMITY BYPASS GRAFT, ANGIOGRAM, POSSIBLE INTERVENTION, POSSIBLE LEFT LOWER EXTREMITY BYPASS;  Surgeon: Teddy Huber MD;  Location: [facility] OR;  Service: Vascular;  Laterality: Left;    THROMBECTOMY Left 4/29/2021    Procedure: GRAFT THROMBECTOMY, LEFT LOWER EXTREMITY;  Surgeon: Teddy Huber MD;  Location: [facility] OR 2ND FLR;  Service: Vascular;  Laterality: Left;  14.5 min  1179.85 mGy  341.01 Gycm2  240 ml dye    THROMBECTOMY  10/22/2021    Procedure: THROMBECTOMY;  Surgeon: Saad Arenas MD;  Location: [facility] CATH LAB/EP;  Service: Cardiology;;       Family History   Problem Relation Age of Onset    Diabetes Mother     Diabetes Father     Heart disease Maternal Grandmother     No Known Problems Maternal Grandfather     Diabetes Paternal Grandmother     No Known Problems Paternal Grandfather     Anesthesia problems Neg Hx        Social History     Socioeconomic History    Marital status:    Occupational History    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          MEDICATIONS & ALLERGIES:     No current  facility-administered medications on file prior to encounter.     Current Outpatient Medications on File Prior to Encounter   Medication Sig Dispense Refill    atorvastatin (LIPITOR) 80 MG tablet TAKE 1 TABLET(80 MG) BY MOUTH EVERY NIGHT 90 tablet 3    clopidogreL (PLAVIX) 75 mg tablet TAKE 1 TABLET(75 MG) BY MOUTH DAILY 90 tablet 3    furosemide (LASIX) 40 MG tablet TAKE 1 TABLET(40 MG) BY MOUTH EVERY DAY 90 tablet 0    NOVOLOG FLEXPEN U-100 INSULIN 100 unit/mL (3 mL) InPn pen INJECT 5 UNITS INTO SKIN THREE TIMES DAILY WITH MEALS PLUS SLIDING SCALE FOR MAX DAILY DOSE OF 25 UNITS, HOLD IF NOT EATING (Patient taking differently: 8 Units. INJECT 8 UNITS INTO SKIN THREE TIMES DAILY WITH MEALS PLUS SLIDING SCALE FOR MAX DAILY DOSE OF 25 UNITS, HOLD IF NOT EATING) 15 mL 0    pantoprazole (PROTONIX) 40 MG tablet TAKE 1 TABLET(40 MG) BY MOUTH DAILY 90 tablet 0    sertraline (ZOLOFT) 50 MG tablet TAKE 1 TABLET(50 MG) BY MOUTH DAILY 30 tablet 11    allopurinoL (ZYLOPRIM) 100 MG tablet Take 1 tablet (100 mg total) by mouth once daily. 30 tablet 5    amitriptyline (ELAVIL) 10 MG tablet Take 1 tablet (10 mg total) by mouth nightly as needed for Pain. 30 tablet 12    aspirin (ECOTRIN) 81 MG EC tablet Take 81 mg by mouth once daily at 6am.      carvediloL (COREG) 3.125 MG tablet TAKE 1 TABLET(3.125 MG) BY MOUTH TWICE DAILY WITH BREAKFAST AND DINNER 180 tablet 0    docusate sodium (COLACE) 100 MG capsule Take 100 mg by mouth 2 (two) times daily.      insulin detemir U-100 (LEVEMIR FLEXTOUCH) 100 unit/mL (3 mL) SubQ InPn pen Inject 12 Units into the skin every evening. 20 mL 0    insulin glargine (LANTUS) 100 unit/mL injection Inject 8 Units into the skin every evening.      losartan (COZAAR) 25 MG tablet Take 12.5 mg by mouth every evening.      melatonin (MELATIN) 3 mg tablet Take 2 tablets (6 mg total) by mouth nightly as needed for Insomnia.  0    multivit-min-FA-lycopen-lutein 0.4 mg-300 mcg- 250 mcg Tab Take 1 tablet by mouth  once daily.      multivit/folic acid/vit K1 (ONE-A-DAY WOMEN'S 50 PLUS ORAL) Take 1 tablet by mouth Daily.      NIFEdipine (PROCARDIA-XL) 30 MG (OSM) 24 hr tablet Take 1 tablet (30 mg total) by mouth once daily. 30 tablet 2    pregabalin (LYRICA) 75 MG capsule Take 1 capsule (75 mg total) by mouth 3 (three) times daily. 90 capsule 6    rivaroxaban (XARELTO) 10 mg Tab Take 2.5 mg by mouth nightly.      sodium bicarbonate 650 MG tablet Take 2 tablets (1,300 mg total) by mouth 3 (three) times daily. 180 tablet 0    [DISCONTINUED] lancing device Misc 1 Device by Misc.(Non-Drug; Combo Route) route 2 (two) times daily with meals. 1 each 0       Review of patient's allergies indicates:   Allergen Reactions    Ciprofloxacin Itching    Contrast media      Kidney injury    Iodine      Kidney injury       OBJECTIVE:     Vital Signs Recent:  Temp: 98.7 °F (37.1 °C) (05/01/23 1716)  Pulse: 101 (05/01/23 1731)  Resp: 18 (05/01/23 1731)  BP: (!) 162/92 (05/01/23 1731)  SpO2: 100 % (05/01/23 1731)  Oxygen Documentation:                Device (Oxygen Therapy): room air         Vital Signs Range (Last 24H):  Temp:  [98.7 °F (37.1 °C)-99.1 °F (37.3 °C)]   Pulse:  []   Resp:  [18-20]   BP: (143-162)/(76-92)   SpO2:  [99 %-100 %]        Weight:  Body mass index is 33.12 kg/m².  Wt Readings from Last 3 Encounters:   05/01/23 90.3 kg (199 lb)   08/13/22 71.8 kg (158 lb 4.6 oz)   08/11/22 68.1 kg (150 lb 2.1 oz)        Physical Exam  Constitutional:       Appearance: Normal appearance. She is obese.   HENT:      Head: Normocephalic and atraumatic.   Eyes:      Extraocular Movements: Extraocular movements intact.      Conjunctiva/sclera: Conjunctivae normal.      Pupils: Pupils are equal, round, and reactive to light.   Cardiovascular:      Rate and Rhythm: Rhythm irregular.      Pulses: Normal pulses.      Comments: Tricuspid regurgitation   Pulmonary:      Breath sounds: Normal breath sounds and air entry.   Chest:   Breasts:      Right: Swelling present.   Abdominal:      General: Bowel sounds are normal.      Comments: R sided swelling   Neurological:      Mental Status: She is alert.        Sodium (mmol/L)   Date Value   05/01/2023 140   08/18/2022 136   08/17/2022 134 (L)     Potassium (mmol/L)   Date Value   05/01/2023 4.2   08/18/2022 4.7   08/17/2022 4.8     Chloride (mmol/L)   Date Value   05/01/2023 110   08/18/2022 112 (H)   08/17/2022 111 (H)     CO2 (mmol/L)   Date Value   05/01/2023 18 (L)   08/18/2022 16 (L)   08/17/2022 14 (L)     BUN (mg/dL)   Date Value   05/01/2023 57 (H)   08/18/2022 44 (H)   08/17/2022 44 (H)     Creatinine (mg/dL)   Date Value   05/01/2023 3.9 (H)   08/18/2022 2.0 (H)   08/17/2022 2.4 (H)     Glucose (mg/dL)   Date Value   05/01/2023 174 (H)   08/18/2022 81   08/17/2022 161 (H)     Calcium (mg/dL)   Date Value   05/01/2023 6.9 (LL)   08/18/2022 8.3 (L)   08/17/2022 7.9 (L)     Magnesium (mg/dL)   Date Value   08/18/2022 1.8   08/17/2022 1.7   08/16/2022 1.6     Phosphorus (mg/dL)   Date Value   08/18/2022 3.8   08/17/2022 3.9   08/16/2022 4.6 (H)     Alkaline Phosphatase (U/L)   Date Value   05/01/2023 233 (H)   08/18/2022 103   08/17/2022 103     ALT (U/L)   Date Value   05/01/2023 42   08/18/2022 9 (L)   08/17/2022 6 (L)     AST (U/L)   Date Value   05/01/2023 45 (H)   08/18/2022 13   08/17/2022 9 (L)     Albumin (g/dL)   Date Value   05/01/2023 2.1 (L)   08/18/2022 1.8 (L)   08/17/2022 1.8 (L)     Total Protein (g/dL)   Date Value   05/01/2023 6.1   08/18/2022 5.7 (L)   08/17/2022 5.6 (L)     Total Bilirubin (mg/dL)   Date Value   05/01/2023 0.3   08/18/2022 0.2   08/17/2022 0.2     INR (no units)   Date Value   03/15/2022 1.1   06/17/2021 1.4 (H)   05/18/2021 1.2       WBC (K/uL)   Date Value   05/01/2023 5.18   08/18/2022 6.37   08/17/2022 5.86     Hemoglobin (g/dL)   Date Value   05/01/2023 9.9 (L)   08/18/2022 8.3 (L)   08/17/2022 8.7 (L)     Platelets (K/uL)   Date Value   05/01/2023 304    08/18/2022 270   08/17/2022 271       Diagnostic Results:   X-Ray Chest AP Portable  Narrative: EXAMINATION:  XR CHEST AP PORTABLE    CLINICAL HISTORY:  Chest Pain.    TECHNIQUE:  Single frontal view of the chest was performed.    COMPARISON:  08/13/2022.    FINDINGS:  There are postoperative changes of median sternotomy.  The sternal wires are intact.  There has been interval removal of the previous left-sided IJ catheter.  Monitoring EKG leads are present.    The trachea is unremarkable.  The cardiomediastinal silhouette is enlarged.  There is no evidence of free air beneath the hemidiaphragms.  There are no pleural effusions.  There is no evidence of a pneumothorax.  There is no evidence of pneumomediastinum.  There is pulmonary interstitial edema.  There are degenerative changes in the osseous structures.  Impression: Cardiomegaly with pulmonary interstitial edema, suggestive of pulmonary edema secondary to CHF.    Electronically signed by: Chele Kumar MD  Date:    05/01/2023  Time:    17:20       ASSESSMENT -- PLAN:   Ms. Suyapa Connelly is a 57 y.o. female with a relevant medical history of CAD s/p CABG, CKD, DM, HTN, HLD, s/p bilateral AKA who is being treated for Acute on chronic combined systolic and diastolic heart failure. This is likely an acute exacerbation of her CHF. Contributing symptoms include SOB, orthopnea, PND, swelling. Her chest XR shows pulmonary edema likely secondary to CHF. BNP is elevated, troponin is mildly elevated at 0.05. ACS should be ruled out on background of her PMH and risk factors. EKG not done yet.       Active Hospital Problems    Diagnosis  POA    *Acute on chronic combined systolic and diastolic heart failure [I50.43]  Yes    Acute hypoxemic respiratory failure [J96.01]  Yes    ROSLYN (acute kidney injury) [N17.9]  Unknown    CKD (chronic kidney disease), stage IV [N18.4]  Yes    Uses self-applied continuous glucose monitoring device [Z97.8]  Yes    Dermatitis associated  with moisture [L30.8]  Yes    Above-knee amputation of right lower extremity [S78.111A]  Yes    Status post above knee amputation, left [Z89.612]  Not Applicable    Hypoalbuminemia [E88.09]  Yes    Impaired functional mobility and endurance [Z74.09]  Yes    Paroxysmal atrial fibrillation [I48.0]  Yes    S/P CABG x 1 [Z95.1]  Not Applicable    Anemia [D64.9]  Yes    CAD (coronary artery disease) [I25.10]  Yes     Formatting of this note might be different from the original.  Last Assessment & Plan:   Formatting of this note might be different from the original.  -- Optimize glucose control.        CAROLIN (generalized anxiety disorder) [F41.1]  Yes    Essential hypertension [I10]  Yes    PAD (peripheral artery disease) [I73.9]  Yes      Resolved Hospital Problems   No resolved problems to display.        Acute on chronic combined systolic and diastolic heart failure  Chest xr showed pulmonary edema. BNP 1063, troponin 0.05. Clinical signs and symptoms of fluid overload  PLAN:  -trend troponins  -EKG  -Lasix 40 mg IV bid, Carvedilol  -fluid restriction    ROSLYN  CKD (chronic kidney disease), stage IV  Cr 3.9,  PLAN:  -Hold losartan  -Monitor renal functions    Hypertension   -Restart home carvedilol, lasix    Paroxysmal atrial fibrillation  - Rivaroxaban tablet 2.5 mg    CAD   -clopidogrel 75  -aspirin 81    CAROLIN  -sertraline    Uses self-applied continuous glucose monitoring device  Lab Results   Component Value Date    HGBA1C 6.9 (H) 03/26/2022    Insulin aspart 3 units tid and determir 8 units nightly    VTE Risk Mitigation (From admission, onward)      None             Discharge planning:   DEVAN:     Code Status: FULL  Is the patient medically ready for discharge? no    Reason for patient still in hospital (select all that apply): Treatment and trending patient condition  Discharge Plan A: Home  Discharge Delays:

## 2023-05-01 NOTE — SUBJECTIVE & OBJECTIVE
Past Medical History:   Diagnosis Date    Anxiety     Chronic pain syndrome     CKD (chronic kidney disease), stage III     Depression     Diabetes mellitus     type 2    Diabetes mellitus, type 2     GERD (gastroesophageal reflux disease)     Hyperemesis 3/23/2021    Hyperlipemia     Hypertension     Hypokalemia 3/23/2021    Infection of below knee amputation stump 3/12/2022    Myocardial infarction 2010    minor-caused by stress per pt.    Osteomyelitis     Osteomyelitis of left foot 4/30/2021    PVD (peripheral vascular disease)     Ulcer of left foot     Vaginal delivery     x1       Past Surgical History:   Procedure Laterality Date    ABOVE-KNEE AMPUTATION Left 5/18/2021    Procedure: AMPUTATION, ABOVE KNEE;  Surgeon: Teddy Huber MD;  Location: [facility] OR [address] [place] [address];  Service: Vascular;  Laterality: Left;    ABOVE-KNEE AMPUTATION Right 3/18/2022    Procedure: AMPUTATION, ABOVE KNEE;  Surgeon: DAYNE Florez II, MD;  Location: [facility] OR [address] [place] [address];  Service: Vascular;  Laterality: Right;    Angiogram - Right Extremity Right 7/9/15    angiogram-left leg  10/6/15    ANGIOGRAPHY OF LOWER EXTREMITY Left 4/29/2021    Procedure: ANGIOGRAM, LOWER EXTREMITY;  Surgeon: Teddy Huber MD;  Location: [facility] OR [address] [place] [address];  Service: Vascular;  Laterality: Left;    BELOW KNEE AMPUTATION OF LOWER EXTREMITY Right 12/28/2021    Procedure: AMPUTATION, BELOW KNEE;  Surgeon: Kaitlyn Rojas MD;  Location: [facility];  Service: General;  Laterality: Right;    CATHETERIZATION OF BOTH LEFT AND RIGHT HEART N/A 12/18/2019    Procedure: CATHETERIZATION, HEART, BOTH LEFT AND RIGHT;  Surgeon: Que Fernando III, MD;  Location: [facility] CATH LAB;  Service: Cardiology;  Laterality: N/A;    CORONARY ANGIOGRAPHY N/A 12/18/2019    Procedure: ANGIOGRAM, CORONARY ARTERY;  Surgeon: Que Fernando III, MD;  Location: [facility] CATH LAB;  Service: Cardiology;  Laterality: N/A;    CORONARY ANGIOGRAPHY INCLUDING BYPASS GRAFTS WITH CATHETERIZATION  OF LEFT HEART N/A 7/28/2020    Procedure: ANGIOGRAM, CORONARY, INCLUDING BYPASS GRAFT, WITH LEFT HEART CATHETERIZATION, 9 am;  Surgeon: Rachel Easley MD;  Location: [facility] CATH LAB;  Service: Cardiology;  Laterality: N/A;    CORONARY ARTERY BYPASS GRAFT (CABG) N/A 1/14/2020    Procedure: CORONARY ARTERY BYPASS GRAFT (CABG) x 1     Off Pump;  Surgeon: Huang Altamirano MD;  Location: [facility] OR [address] [place] [address];  Service: Cardiovascular;  Laterality: N/A;    CREATION OF FEMORAL-TIBIAL ARTERY BYPASS Left 4/29/2021    Procedure: CREATION, BYPASS, ARTERIAL, FEMORAL TO ANTERIOR TIBIAL;  Surgeon: Teddy Huber MD;  [facility];  Service: Vascular;  Laterality: Left;    CREATION OF FEMOROPOPLITEAL ARTERIAL BYPASS USING GRAFT Left 8/18/2020    Procedure: CREATION, BYPASS, ARTERIAL, FEMORAL TO POPLITEAL, USING GRAFT, LEFT LOWER EXTREMITY;  Surgeon: Teddy Huber MD;  Location: [facility] OR;  Service: Vascular;  Laterality: Left;  REQUEST 7:15 A.M. START----COVID NEGATIVE ON 8/17  1ST CASE STARTE PER LEANA ON 8/7/2020 @ 942AM-  RN PREOP 8/12/2020   T/S-----CLEARED BY CARDS-------PENDING INSURANCE    DEBRIDEMENT OF FOOT Left 9/8/2020    Procedure: DEBRIDEMENT, FOOT;  Surgeon: Rosio Mayes DPM;  Location: [facility] OR;  Service: Podiatry;  Laterality: Left;  request neoxx .   RN Pre Op 9-4-2020, Covid negative on 9/5/20. C A    DEBRIDEMENT OF FOOT  3/4/2021    Procedure: DEBRIDEMENT, FOOT;  Surgeon: Teddy Huber MD;  Location: [facility] OR;  Service: Vascular;;    DEBRIDEMENT OF FOOT Left 3/9/2021    Procedure: DEBRIDEMENT, FOOT, bone biopsy;  Surgeon: Rosio Mayes DPM;  Location: [facility] OR;  Service: Podiatry;  Laterality: Left;  Request neoxx---COVID IN AM  REQUESTING NOON START  RN Phone Pre op.On Blood thinners Plavix and Eliquis.  Covid am of surgery. C A    DEBRIDEMENT OF FOOT Left 5/4/2021    Procedure: DEBRIDEMENT, FOOT;  Surgeon: Farooq Morley DPM;  Location: [facility] OR [address] [place] [address];  Service:  Podiatry;  Laterality: Left;    INSERTION OF TUNNELED CENTRAL VENOUS HEMODIALYSIS CATHETER N/A 1/27/2020    Procedure: Insertion, Catheter, Central Venous, Hemodialysis;  Surgeon: ESTEBAN Gomez III, MD;  Location: [facility] CATH LAB;  Service: Peripheral Vascular;  Laterality: N/A;    PERCUTANEOUS TRANSLUMINAL ANGIOPLASTY N/A 3/4/2021    Procedure: PTA (ANGIOPLASTY, PERCUTANEOUS, TRANSLUMINAL);  Surgeon: Teddy Huber MD;  Location: [facility] OR;  Service: Vascular;  Laterality: N/A;    REMOVAL OF ARTERIOVENOUS GRAFT Left 5/27/2021    Procedure: REMOVAL, GRAFT, LEFT LOWER EXTREMITY, WOUND EXPLORATION;  Surgeon: Teddy Huber MD;  Location: [facility] OR 2ND FLR;  Service: Vascular;  Laterality: Left;    REMOVAL OF NAIL OF DIGIT Left 3/9/2021    Procedure: REMOVAL, NAIL, DIGIT;  Surgeon: Rosio Mayes DPM;  Location: [facility] OR;  Service: Podiatry;  Laterality: Left;    THROMBECTOMY Left 3/4/2021    Procedure: THROMBECTOMY, LEFT LOWER EXTREMITY BYPASS GRAFT, ANGIOGRAM, POSSIBLE INTERVENTION, POSSIBLE LEFT LOWER EXTREMITY BYPASS;  Surgeon: Teddy Huber MD;  Location: [facility] OR;  Service: Vascular;  Laterality: Left;    THROMBECTOMY Left 4/29/2021    Procedure: GRAFT THROMBECTOMY, LEFT LOWER EXTREMITY;  Surgeon: Teddy Huber MD;  Location: [facility] OR 2ND FLR;  Service: Vascular;  Laterality: Left;  14.5 min  1179.85 mGy  341.01 Gycm2  240 ml dye    THROMBECTOMY  10/22/2021    Procedure: THROMBECTOMY;  Surgeon: Saad Arenas MD;  Location: [facility] CATH LAB/EP;  Service: Cardiology;;       Review of patient's allergies indicates:   Allergen Reactions    Ciprofloxacin Itching    Contrast media      Kidney injury    Iodine      Kidney injury       No current facility-administered medications on file prior to encounter.     Current Outpatient Medications on File Prior to Encounter   Medication Sig    atorvastatin (LIPITOR) 80 MG tablet TAKE 1 TABLET(80 MG) BY MOUTH EVERY NIGHT    clopidogreL (PLAVIX) 75 mg tablet  TAKE 1 TABLET(75 MG) BY MOUTH DAILY    furosemide (LASIX) 40 MG tablet TAKE 1 TABLET(40 MG) BY MOUTH EVERY DAY    NOVOLOG FLEXPEN U-100 INSULIN 100 unit/mL (3 mL) InPn pen INJECT 5 UNITS INTO SKIN THREE TIMES DAILY WITH MEALS PLUS SLIDING SCALE FOR MAX DAILY DOSE OF 25 UNITS, HOLD IF NOT EATING (Patient taking differently: 8 Units. INJECT 8 UNITS INTO SKIN THREE TIMES DAILY WITH MEALS PLUS SLIDING SCALE FOR MAX DAILY DOSE OF 25 UNITS, HOLD IF NOT EATING)    pantoprazole (PROTONIX) 40 MG tablet TAKE 1 TABLET(40 MG) BY MOUTH DAILY    sertraline (ZOLOFT) 50 MG tablet TAKE 1 TABLET(50 MG) BY MOUTH DAILY    allopurinoL (ZYLOPRIM) 100 MG tablet Take 1 tablet (100 mg total) by mouth once daily.    amitriptyline (ELAVIL) 10 MG tablet Take 1 tablet (10 mg total) by mouth nightly as needed for Pain.    aspirin (ECOTRIN) 81 MG EC tablet Take 81 mg by mouth once daily at 6am.    carvediloL (COREG) 3.125 MG tablet TAKE 1 TABLET(3.125 MG) BY MOUTH TWICE DAILY WITH BREAKFAST AND DINNER    docusate sodium (COLACE) 100 MG capsule Take 100 mg by mouth 2 (two) times daily.    insulin detemir U-100 (LEVEMIR FLEXTOUCH) 100 unit/mL (3 mL) SubQ InPn pen Inject 12 Units into the skin every evening.    insulin glargine (LANTUS) 100 unit/mL injection Inject 8 Units into the skin every evening.    losartan (COZAAR) 25 MG tablet Take 12.5 mg by mouth every evening.    melatonin (MELATIN) 3 mg tablet Take 2 tablets (6 mg total) by mouth nightly as needed for Insomnia.    multivit-min-FA-lycopen-lutein 0.4 mg-300 mcg- 250 mcg Tab Take 1 tablet by mouth once daily.    multivit/folic acid/vit K1 (ONE-A-DAY WOMEN'S 50 PLUS ORAL) Take 1 tablet by mouth Daily.    NIFEdipine (PROCARDIA-XL) 30 MG (OSM) 24 hr tablet Take 1 tablet (30 mg total) by mouth once daily.    pregabalin (LYRICA) 75 MG capsule Take 1 capsule (75 mg total) by mouth 3 (three) times daily.    rivaroxaban (XARELTO) 10 mg Tab Take 2.5 mg by mouth nightly.    sodium bicarbonate 650 MG  tablet Take 2 tablets (1,300 mg total) by mouth 3 (three) times daily.    [DISCONTINUED] lancing device Misc 1 Device by Misc.(Non-Drug; Combo Route) route 2 (two) times daily with meals.     Family History       Problem Relation (Age of Onset)    Diabetes Mother, Father, Paternal Grandmother    Heart disease Maternal Grandmother    No Known Problems Maternal Grandfather, Paternal Grandfather          Tobacco Use    Smoking status: Former    Smokeless tobacco: Never   Substance and Sexual Activity    Alcohol use: No    Drug use: Yes     Types: Marijuana     Comment: occassional    Sexual activity: Yes     Partners: Male     Review of Systems   Constitutional:  Negative for chills, fatigue and fever.   HENT:  Negative for congestion, dental problem, facial swelling, postnasal drip, rhinorrhea, sore throat and trouble swallowing.    Eyes:  Negative for photophobia and pain.   Respiratory:  Positive for chest tightness and shortness of breath. Negative for cough, choking and wheezing.    Cardiovascular:  Positive for chest pain (pleuritic).   Gastrointestinal:  Negative for abdominal distention, abdominal pain, diarrhea, nausea and vomiting.   Endocrine: Negative.    Genitourinary:  Negative for dysuria, flank pain, frequency and hematuria.   Musculoskeletal:  Negative for back pain.   Skin:  Negative for pallor and rash.   Allergic/Immunologic: Negative.    Neurological:  Negative for dizziness, syncope, weakness, numbness and headaches.   Psychiatric/Behavioral:  The patient is not nervous/anxious.    Objective:     Vital Signs (Most Recent):  Temp: 98.7 °F (37.1 °C) (05/01/23 1716)  Pulse: 101 (05/01/23 1731)  Resp: 18 (05/01/23 1731)  BP: (!) 162/92 (05/01/23 1731)  SpO2: 100 % (05/01/23 1731)   Vital Signs (24h Range):  Temp:  [98.7 °F (37.1 °C)-99.1 °F (37.3 °C)] 98.7 °F (37.1 °C)  Pulse:  [] 101  Resp:  [18-20] 18  SpO2:  [99 %-100 %] 100 %  BP: (143-162)/(76-92) 162/92     Weight: 90.3 kg (199 lb)  Body  mass index is 33.12 kg/m².    Physical Exam  Vitals and nursing note reviewed.   Constitutional:       General: She is not in acute distress.     Appearance: Normal appearance. She is not ill-appearing or diaphoretic.   HENT:      Head: Normocephalic and atraumatic.      Right Ear: External ear normal.      Left Ear: External ear normal.      Nose: Nose normal.      Mouth/Throat:      Mouth: Mucous membranes are moist.      Pharynx: Oropharynx is clear.   Eyes:      General: No scleral icterus.     Extraocular Movements: Extraocular movements intact.      Conjunctiva/sclera: Conjunctivae normal.      Pupils: Pupils are equal, round, and reactive to light.   Cardiovascular:      Rate and Rhythm: Normal rate and regular rhythm.      Pulses: Normal pulses.      Heart sounds: Normal heart sounds. No murmur heard.  Pulmonary:      Effort: Pulmonary effort is normal. No respiratory distress.      Breath sounds: Normal breath sounds. No stridor. No wheezing or rhonchi.   Chest:      Chest wall: No tenderness.   Abdominal:      General: Abdomen is flat. There is no distension.      Palpations: Abdomen is soft.      Tenderness: There is no abdominal tenderness. There is no guarding or rebound.   Musculoskeletal:         General: No swelling or tenderness. Normal range of motion.      Cervical back: Normal range of motion and neck supple. No rigidity or tenderness.   Skin:     General: Skin is warm and dry.      Capillary Refill: Capillary refill takes less than 2 seconds.      Coloration: Skin is not jaundiced.      Findings: No erythema.   Neurological:      General: No focal deficit present.      Mental Status: She is alert and oriented to person, place, and time.      Cranial Nerves: No cranial nerve deficit.      Motor: No weakness.   Psychiatric:         Mood and Affect: Mood normal.         Behavior: Behavior normal.        Significant Labs: All pertinent labs within the past 24 hours have been reviewed.    Significant  Imaging: I have reviewed all pertinent imaging results/findings within the past 24 hours.

## 2023-05-01 NOTE — ASSESSMENT & PLAN NOTE
Large body habitus with dermatitis to folds. Will monitor for infection.    -Zinc based powder  -Topical abx if indicated

## 2023-05-01 NOTE — Clinical Note
The PA catheter was repositioned to the right pulmonary artery. Hemodynamics were performed. O2 saturation was measured at 66%.

## 2023-05-01 NOTE — Clinical Note
CATH BLUE FLEXTIP TL 7FR Central line catheter inserted through the sheath in the left IJ. Catheter sutured in place.

## 2023-05-01 NOTE — ED PROVIDER NOTES
Encounter Date: 5/1/2023       History     Chief Complaint   Patient presents with    Chest Pain     Hx cabg, max amputee     [address] [place] HPI: This patient is a 57 y.o. female with past medical history of CAD s/p CABG, CKD, DM, HTN, HLD, s/p bilateral AKA who presnets with chest pain, difficulty breathing, leg swelling. She has been unable to access any of her medications for the last 3 months. Over the last few months she has noted worsening shortness of breath, particulatly orthopnea, and leg swelling. In the last two weeks she endorses associated abdominal pain and swelling, reduced appetite, and pleuritic chest pain with deep inspiration. She endorses occasional lighthededness with changes in position. Last week she had an episode where she was unable to breathe for about two minutes which was very upsetting for her and her , but she recovered and did not seek care at that time. Today, she complains of significant difficulty breathing, swelling in her legs and abdomen, and reduced appetite. No headaches, visual changes, URI symptoms, palpitations, nausea, vomiting, diarrhea, blood in her stool, dysuria, hematuria, numbness or weakness in her extremities. She does endorse occasional paresthesias associated with her chronic phantom limb pain. She additionally has had increased difficulties with depression recently, denies suicidal or homicidal ideation, but has had considerable stress and low mood.     Of note, the patient saw a new primary care provider 4/21 for similar concerns who recommended she proceed to the ED at that time, but they were unable due to financial and geographic concerns. She lives two hours away from [facility] and her  cares for her and elderly mother with dementia. She has had decreased access to medical care over the last year, and unable to access most of her prescriptions. She had a 90 day supply of four medications (lasix, statin, clopidogrel, sertraline) but these were  accidentally lost and she has been unable to afford a replacement. They present today for evaluation and to reestablish care and access to her prescriptions.       The history is provided by the patient and the spouse.   Review of patient's allergies indicates:   Allergen Reactions    Ciprofloxacin Itching    Contrast media      Kidney injury    Iodine      Kidney injury     Past Medical History:   Diagnosis Date    Anxiety     Chronic pain syndrome     CKD (chronic kidney disease), stage III     Depression     Diabetes mellitus     type 2    Diabetes mellitus, type 2     GERD (gastroesophageal reflux disease)     Hyperemesis 3/23/2021    Hyperlipemia     Hypertension     Hypokalemia 3/23/2021    Infection of below knee amputation stump 3/12/2022    Myocardial infarction 2010    minor-caused by stress per pt.    Osteomyelitis     Osteomyelitis of left foot 4/30/2021    PVD (peripheral vascular disease)     Ulcer of left foot     Vaginal delivery     x1     Past Surgical History:   Procedure Laterality Date    ABOVE-KNEE AMPUTATION Left 5/18/2021    Procedure: AMPUTATION, ABOVE KNEE;  Surgeon: Teddy Huber MD;  Location: [address];  Service: Vascular;  Laterality: Left;    ABOVE-KNEE AMPUTATION Right 3/18/2022    Procedure: AMPUTATION, ABOVE KNEE;  Surgeon: DAYNE Florez II, MD;  Location: [facility] OR [address] [place] [address];  Service: Vascular;  Laterality: Right;    Angiogram - Right Extremity Right 7/9/15    angiogram-left leg  10/6/15    ANGIOGRAPHY OF LOWER EXTREMITY Left 4/29/2021    Procedure: ANGIOGRAM, LOWER EXTREMITY;  Surgeon: Teddy Huber MD;  Location: [address];  Service: Vascular;  Laterality: Left;    BELOW KNEE AMPUTATION OF LOWER EXTREMITY Right 12/28/2021    Procedure: AMPUTATION, BELOW KNEE;  Surgeon: Kaitlyn Rojas MD;  Location: [facility];  Service: General;  Laterality: Right;    CATHETERIZATION OF BOTH LEFT AND RIGHT HEART N/A 12/18/2019    Procedure: CATHETERIZATION, HEART,  BOTH LEFT AND RIGHT;  Surgeon: Que Fernando III, MD;  Location: [facility] CATH LAB;  Service: Cardiology;  Laterality: N/A;    CORONARY ANGIOGRAPHY N/A 12/18/2019    Procedure: ANGIOGRAM, CORONARY ARTERY;  Surgeon: Que Fernando III, MD;  Location: [facility] CATH LAB;  Service: Cardiology;  Laterality: N/A;    CORONARY ANGIOGRAPHY INCLUDING BYPASS GRAFTS WITH CATHETERIZATION OF LEFT HEART N/A 7/28/2020    Procedure: ANGIOGRAM, CORONARY, INCLUDING BYPASS GRAFT, WITH LEFT HEART CATHETERIZATION, 9 am;  Surgeon: Rachel Easley MD;  Location: [facility] CATH LAB;  Service: Cardiology;  Laterality: N/A;    CORONARY ARTERY BYPASS GRAFT (CABG) N/A 1/14/2020    Procedure: CORONARY ARTERY BYPASS GRAFT (CABG) x 1     Off Pump;  Surgeon: Huang Altamirano MD;  Location: [facility] OR [address] [place] [address];  Service: Cardiovascular;  Laterality: N/A;    CREATION OF FEMORAL-TIBIAL ARTERY BYPASS Left 4/29/2021    Procedure: CREATION, BYPASS, ARTERIAL, FEMORAL TO ANTERIOR TIBIAL;  Surgeon: Teddy Huber MD;  [facility];  Service: Vascular;  Laterality: Left;    CREATION OF FEMOROPOPLITEAL ARTERIAL BYPASS USING GRAFT Left 8/18/2020    Procedure: CREATION, BYPASS, ARTERIAL, FEMORAL TO POPLITEAL, USING GRAFT, LEFT LOWER EXTREMITY;  Surgeon: Teddy Huber MD;  Location: [facility];  Service: Vascular;  Laterality: Left;  REQUEST 7:15 A.M. START----COVID NEGATIVE ON 8/17  1ST CASE STARTKENYA PER LEANA ON 8/7/2020 @ 942AM-  RN PREOP 8/12/2020   T/S-----CLEARED BY CARDS-------PENDING INSURANCE    DEBRIDEMENT OF FOOT Left 9/8/2020    Procedure: DEBRIDEMENT, FOOT;  Surgeon: Rosio Mayes DPM;  Location: [facility] OR;  Service: Podiatry;  Laterality: Left;  request neoxx .   RN Pre Op 9-4-2020, Covid negative on 9/5/20. C A    DEBRIDEMENT OF FOOT  3/4/2021    Procedure: DEBRIDEMENT, FOOT;  Surgeon: Teddy Huber MD;  Location: [facility] OR;  Service: Vascular;;    DEBRIDEMENT OF FOOT Left 3/9/2021    Procedure: DEBRIDEMENT, FOOT, bone  biopsy;  Surgeon: Rosio Mayes DPM;  Location: [facility] OR;  Service: Podiatry;  Laterality: Left;  Request neoxx---COVID IN AM  REQUESTING NOON START  RN Phone Pre op.On Blood thinners Plavix and Eliquis.  Covid am of surgery. C A    DEBRIDEMENT OF FOOT Left 5/4/2021    Procedure: DEBRIDEMENT, FOOT;  Surgeon: Farooq Morley DPM;  Location: [facility] OR [facility];  Service: Podiatry;  Laterality: Left;    INSERTION OF TUNNELED CENTRAL VENOUS HEMODIALYSIS CATHETER N/A 1/27/2020    Procedure: Insertion, Catheter, Central Venous, Hemodialysis;  Surgeon: ESTEBAN Gomez III, MD;  Location: [facility] CATH LAB;  Service: Peripheral Vascular;  Laterality: N/A;    PERCUTANEOUS TRANSLUMINAL ANGIOPLASTY N/A 3/4/2021    Procedure: PTA (ANGIOPLASTY, PERCUTANEOUS, TRANSLUMINAL);  Surgeon: Teddy Huber MD;  Location: [facility] OR;  Service: Vascular;  Laterality: N/A;    REMOVAL OF ARTERIOVENOUS GRAFT Left 5/27/2021    Procedure: REMOVAL, GRAFT, LEFT LOWER EXTREMITY, WOUND EXPLORATION;  Surgeon: Teddy Huber MD;  [facility];  Service: Vascular;  Laterality: Left;    REMOVAL OF NAIL OF DIGIT Left 3/9/2021    Procedure: REMOVAL, NAIL, DIGIT;  Surgeon: Rosio Mayes DPM;  Location: [facility] OR;  Service: Podiatry;  Laterality: Left;    THROMBECTOMY Left 3/4/2021    Procedure: THROMBECTOMY, LEFT LOWER EXTREMITY BYPASS GRAFT, ANGIOGRAM, POSSIBLE INTERVENTION, POSSIBLE LEFT LOWER EXTREMITY BYPASS;  Surgeon: Teddy Huber MD;  Location: [facility] OR;  Service: Vascular;  Laterality: Left;    THROMBECTOMY Left 4/29/2021    Procedure: GRAFT THROMBECTOMY, LEFT LOWER EXTREMITY;  Surgeon: Teddy Huber MD;  Location: [facility] OR 2ND FLR;  Service: Vascular;  Laterality: Left;  14.5 min  1179.85 mGy  341.01 Gycm2  240 ml dye    THROMBECTOMY  10/22/2021    Procedure: THROMBECTOMY;  Surgeon: Saad Arenas MD;  Location: [facility] CATH LAB/EP;  Service: Cardiology;;     Family History   Problem Relation Age of Onset     Diabetes Mother     Diabetes Father     Heart disease Maternal Grandmother     No Known Problems Maternal Grandfather     Diabetes Paternal Grandmother     No Known Problems Paternal Grandfather     Anesthesia problems Neg Hx      Social History     Tobacco Use    Smoking status: Former    Smokeless tobacco: Never   Substance Use Topics    Alcohol use: No    Drug use: Yes     Types: Marijuana     Comment: occassional     Review of Systems   Constitutional:  Positive for appetite change. Negative for chills and fever.   HENT:  Negative for rhinorrhea and sore throat.    Respiratory:  Positive for shortness of breath. Negative for cough.    Cardiovascular:  Positive for chest pain. Negative for palpitations.   Gastrointestinal:  Positive for abdominal pain and nausea. Negative for vomiting.   Genitourinary:  Negative for dysuria and urgency.   Musculoskeletal:  Positive for joint swelling.   Skin:  Negative for color change.   Neurological:  Positive for light-headedness. Negative for headaches.   Psychiatric/Behavioral:  Negative for agitation and confusion. The patient is nervous/anxious.      Physical Exam     Initial Vitals [05/01/23 1537]   BP Pulse Resp Temp SpO2   (!) 143/86 102 20 99.1 °F (37.3 °C) 99 %      MAP       --         Physical Exam    Constitutional: She is not diaphoretic. No distress.   HENT:   Head: Normocephalic and atraumatic.   Eyes: Conjunctivae and EOM are normal. Pupils are equal, round, and reactive to light.   Neck: Neck supple.   Normal range of motion.  Cardiovascular:  Normal rate and regular rhythm.           Pulmonary/Chest: She has rales.   Abdominal: There is abdominal tenderness. There is no rebound and no guarding.   Musculoskeletal:         General: Tenderness and edema present.      Cervical back: Normal range of motion and neck supple.     Neurological: She is alert and oriented to person, place, and time.   Skin: Skin is warm and dry.   Psychiatric: She is slowed. She  exhibits a depressed mood.       ED Course   Procedures  Labs Reviewed   CBC W/ AUTO DIFFERENTIAL - Abnormal; Notable for the following components:       Result Value    RBC 3.24 (*)     Hemoglobin 9.9 (*)     Hematocrit 32.4 (*)      (*)     MCHC 30.6 (*)     RDW 15.6 (*)     Lymph # 0.9 (*)     Lymph % 17.0 (*)     All other components within normal limits    Narrative:     Release to patient->Immediate   COMPREHENSIVE METABOLIC PANEL - Abnormal; Notable for the following components:    CO2 18 (*)     Glucose 174 (*)     BUN 57 (*)     Creatinine 3.9 (*)     Calcium 6.9 (*)     Albumin 2.1 (*)     Alkaline Phosphatase 233 (*)     AST 45 (*)     eGFR 12.9 (*)     All other components within normal limits    Narrative:     Release to patient->Immediate   TROPONIN I - Abnormal; Notable for the following components:    Troponin I 0.051 (*)     All other components within normal limits    Narrative:     Release to patient->Immediate   B-TYPE NATRIURETIC PEPTIDE - Abnormal; Notable for the following components:    BNP 1,063 (*)     All other components within normal limits    Narrative:     Release to patient->Immediate   POCT GLUCOSE - Abnormal; Notable for the following components:    POCT Glucose 146 (*)     All other components within normal limits   HIV 1 / 2 ANTIBODY    Narrative:     Release to patient->Immediate   HEPATITIS C ANTIBODY    Narrative:     Release to patient->Immediate   TROPONIN ISTAT   TROPONIN I   POCT TROPONIN   POCT GLUCOSE MONITORING CONTINUOUS          Imaging Results              X-Ray Chest AP Portable (Final result)  Result time 05/01/23 17:20:10      Final result by Chele Kumar MD (05/01/23 17:20:10)                   Impression:      Cardiomegaly with pulmonary interstitial edema, suggestive of pulmonary edema secondary to CHF.      Electronically signed by: Chele Kumar MD  Date:    05/01/2023  Time:    17:20               Narrative:    EXAMINATION:  XR CHEST AP  PORTABLE    CLINICAL HISTORY:  Chest Pain.    TECHNIQUE:  Single frontal view of the chest was performed.    COMPARISON:  08/13/2022.    FINDINGS:  There are postoperative changes of median sternotomy.  The sternal wires are intact.  There has been interval removal of the previous left-sided IJ catheter.  Monitoring EKG leads are present.    The trachea is unremarkable.  The cardiomediastinal silhouette is enlarged.  There is no evidence of free air beneath the hemidiaphragms.  There are no pleural effusions.  There is no evidence of a pneumothorax.  There is no evidence of pneumomediastinum.  There is pulmonary interstitial edema.  There are degenerative changes in the osseous structures.                                       Medications   allopurinoL tablet 100 mg (has no administration in time range)   aspirin EC tablet 81 mg (has no administration in time range)   atorvastatin tablet 80 mg (has no administration in time range)   carvediloL tablet 3.125 mg (has no administration in time range)   clopidogreL tablet 75 mg (has no administration in time range)   docusate sodium capsule 100 mg (has no administration in time range)   insulin detemir U-100 (Levemir) pen 8 Units (has no administration in time range)   losartan split tablet 12.5 mg (has no administration in time range)   pantoprazole EC tablet 40 mg (has no administration in time range)   rivaroxaban tablet 2.5 mg (has no administration in time range)   sertraline tablet 50 mg (has no administration in time range)   dextrose 40 % gel 15,000 mg (has no administration in time range)   dextrose 40 % gel 30,000 mg (has no administration in time range)   dextrose 10% bolus 125 mL 125 mL (has no administration in time range)   dextrose 10% bolus 250 mL 250 mL (has no administration in time range)   glucagon (human recombinant) injection 1 mg (has no administration in time range)   insulin aspart U-100 pen 3 Units (has no administration in time range)   insulin  aspart U-100 pen 0-5 Units (has no administration in time range)   sodium bicarbonate tablet 1,300 mg (has no administration in time range)   furosemide injection 40 mg (has no administration in time range)   sodium chloride 0.9% flush 10 mL (has no administration in time range)   naloxone 0.4 mg/mL injection 0.02 mg (has no administration in time range)   dextrose 40 % gel 15,000 mg (has no administration in time range)   glucagon (human recombinant) injection 1 mg (has no administration in time range)   furosemide injection 80 mg (80 mg Intravenous Given 5/1/23 1712)   calcium carbonate 200 mg calcium (500 mg) chewable tablet 500 mg (500 mg Oral Given 5/1/23 1715)     Medical Decision Making:   Initial Assessment:   Patient with jvd, peripheral edema, rales on auscultation with increasing sob in setting of ischemic heart disease 2/2 non compliance indicative for chf exacerbation. Has diffused abdominal pain, elevated troponin but attribute this to fluid overload as opposed to ACS, other etiologies.  Differential Diagnosis:   CHF exacerbation, ACS, anasarca, ROSLYN on CKD  Clinical Tests:   Lab Tests: Ordered  Radiological Study: Ordered  Medical Tests: Ordered  ED Management:  BNP > 1000 and physical exam findings point towards CHF exacerbation. Will diurese with IV lasix 80 and admit to hospital medicine.  Other:   I have discussed this case with another health care provider.       <> Summary of the Discussion: Discussed case and plan with supervising physician                        Clinical Impression:   Final diagnoses:  [R07.9] Chest pain  [I50.9] CHF (congestive heart failure)        ED Disposition Condition    Observation                 Lisa Vick MD  Resident  05/01/23 [id number]

## 2023-05-01 NOTE — H&P
[facility] - Emergency Dept  [facility] Medicine  History & Physical    Patient Name: Suyapa Connelly  MRN: [id number]  Patient Class: OP- Observation  Admission Date: 5/1/2023  Attending Physician: Flavia Delgado MD   Primary Care Provider: Magen Christensen MD         Patient information was obtained from patient, past medical records and ER records.     Subjective:     Principal Problem:Acute on chronic combined systolic and diastolic heart failure    Chief Complaint:   Chief Complaint   Patient presents with    Chest Pain     Hx cabg, max amputee        HPI: 57 y.o. female with past medical history of CAD s/p CABG, CKD, DM, HTN, HLD, s/p bilateral AKA who presnets with chest pain, difficulty breathing, leg swelling.     Per ED She has been unable to access any of her medications for the last 3 months. Over the last few months she has noted worsening shortness of breath, particulatly orthopnea, and leg swelling. In the last two weeks she endorses associated abdominal pain and swelling, reduced appetite, and pleuritic chest pain with deep inspiration. She endorses occasional lighthededness with changes in position. Last week she had an episode where she was unable to breathe for about two minutes which was very upsetting for her and her , but she recovered and did not seek care at that time. Today, she complains of significant difficulty breathing, swelling in her legs and abdomen, and reduced appetite.     No headaches, visual changes, URI symptoms, palpitations, nausea, vomiting, diarrhea, blood in her stool, dysuria, hematuria, numbness or weakness in her extremities. She does endorse occasional paresthesias associated with her chronic phantom limb pain. She additionally has had increased difficulties with depression recently, denies suicidal or homicidal ideation, but has had considerable stress and low mood.      Of note, the patient saw a new primary care provider 4/21 for similar concerns who recommended  she proceed to the ED at that time, but they were unable due to financial and geographic concerns. She lives two hours away from [facility] and her  cares for her and elderly mother with dementia. She has had decreased access to medical care over the last year, and unable to access most of her prescriptions. She had a 90 day supply of four medications (lasix, statin, clopidogrel, sertraline) but these were accidentally lost and she has been unable to afford a replacement. They present today for evaluation and to reestablish care and access to her prescriptions.     Labs on admission significant for an elevated BNP, hypocalcemia which corrected is 8.3, significant elevation in Creatinine and a slight elevation of troponin    CXR  Cardiomegaly with pulmonary interstitial edema, suggestive of pulmonary edema secondary to CHF.      Past Medical History:   Diagnosis Date    Anxiety     Chronic pain syndrome     CKD (chronic kidney disease), stage III     Depression     Diabetes mellitus     type 2    Diabetes mellitus, type 2     GERD (gastroesophageal reflux disease)     Hyperemesis 3/23/2021    Hyperlipemia     Hypertension     Hypokalemia 3/23/2021    Infection of below knee amputation stump 3/12/2022    Myocardial infarction 2010    minor-caused by stress per pt.    Osteomyelitis     Osteomyelitis of left foot 4/30/2021    PVD (peripheral vascular disease)     Ulcer of left foot     Vaginal delivery     x1       Past Surgical History:   Procedure Laterality Date    ABOVE-KNEE AMPUTATION Left 5/18/2021    Procedure: AMPUTATION, ABOVE KNEE;  Surgeon: Teddy Huber MD;  Location: [facility] OR [address] [place] [address];  Service: Vascular;  Laterality: Left;    ABOVE-KNEE AMPUTATION Right 3/18/2022    Procedure: AMPUTATION, ABOVE KNEE;  Surgeon: DAYNE Florez II, MD;  Location: [facility] OR [address] [place] [address];  Service: Vascular;  Laterality: Right;    Angiogram - Right Extremity Right 7/9/15    angiogram-left leg  10/6/15    ANGIOGRAPHY OF LOWER  EXTREMITY Left 4/29/2021    Procedure: ANGIOGRAM, LOWER EXTREMITY;  Surgeon: Teddy Huber MD;  [facility];  Service: Vascular;  Laterality: Left;    BELOW KNEE AMPUTATION OF LOWER EXTREMITY Right 12/28/2021    Procedure: AMPUTATION, BELOW KNEE;  Surgeon: Kaitlyn Rojas MD;  Location: [facility] OR;  Service: General;  Laterality: Right;    CATHETERIZATION OF BOTH LEFT AND RIGHT HEART N/A 12/18/2019    Procedure: CATHETERIZATION, HEART, BOTH LEFT AND RIGHT;  Surgeon: Que Fernando III, MD;  Location: [facility] CATH LAB;  Service: Cardiology;  Laterality: N/A;    CORONARY ANGIOGRAPHY N/A 12/18/2019    Procedure: ANGIOGRAM, CORONARY ARTERY;  Surgeon: Que Fernando III, MD;  Location: [facility] CATH LAB;  Service: Cardiology;  Laterality: N/A;    CORONARY ANGIOGRAPHY INCLUDING BYPASS GRAFTS WITH CATHETERIZATION OF LEFT HEART N/A 7/28/2020    Procedure: ANGIOGRAM, CORONARY, INCLUDING BYPASS GRAFT, WITH LEFT HEART CATHETERIZATION, 9 am;  Surgeon: Rachel Easley MD;  Location: [facility] CATH LAB;  Service: Cardiology;  Laterality: N/A;    CORONARY ARTERY BYPASS GRAFT (CABG) N/A 1/14/2020    Procedure: CORONARY ARTERY BYPASS GRAFT (CABG) x 1     Off Pump;  Surgeon: Huang Altamirano MD;  [facility];  Service: Cardiovascular;  Laterality: N/A;    CREATION OF FEMORAL-TIBIAL ARTERY BYPASS Left 4/29/2021    Procedure: CREATION, BYPASS, ARTERIAL, FEMORAL TO ANTERIOR TIBIAL;  Surgeon: Teddy Huber MD;  [facility];  Service: Vascular;  Laterality: Left;    CREATION OF FEMOROPOPLITEAL ARTERIAL BYPASS USING GRAFT Left 8/18/2020    Procedure: CREATION, BYPASS, ARTERIAL, FEMORAL TO POPLITEAL, USING GRAFT, LEFT LOWER EXTREMITY;  Surgeon: Teddy Huber MD;  Location: [facility];  Service: Vascular;  Laterality: Left;  REQUEST 7:15 A.M. START----COVID NEGATIVE ON 8/17  1ST CASE STARTE PER LEANA ON 8/7/2020 @ 942AM-LO  RN PREOP 8/12/2020   T/S-----CLEARED BY  CARDS-------PENDING INSURANCE    DEBRIDEMENT OF FOOT Left 9/8/2020    Procedure: DEBRIDEMENT, FOOT;  Surgeon: Rosio Mayes DPM;  Location: [facility] OR;  Service: Podiatry;  Laterality: Left;  request neoxx .   RN Pre Op 9-4-2020, Covid negative on 9/5/20. C A    DEBRIDEMENT OF FOOT  3/4/2021    Procedure: DEBRIDEMENT, FOOT;  Surgeon: Teddy Huber MD;  Location: [facility] OR;  Service: Vascular;;    DEBRIDEMENT OF FOOT Left 3/9/2021    Procedure: DEBRIDEMENT, FOOT, bone biopsy;  Surgeon: Rosio Mayes DPM;  Location: [facility] OR;  Service: Podiatry;  Laterality: Left;  Request neoxx---COVID IN AM  REQUESTING NOON START  RN Phone Pre op.On Blood thinners Plavix and Eliquis.  Covid am of surgery. C A    DEBRIDEMENT OF FOOT Left 5/4/2021    Procedure: DEBRIDEMENT, FOOT;  Surgeon: Farooq Morley DPM;  [facility];  Service: Podiatry;  Laterality: Left;    INSERTION OF TUNNELED CENTRAL VENOUS HEMODIALYSIS CATHETER N/A 1/27/2020    Procedure: Insertion, Catheter, Central Venous, Hemodialysis;  Surgeon: ESTEBAN Gomez III, MD;  Location: [facility] CATH LAB;  Service: Peripheral Vascular;  Laterality: N/A;    PERCUTANEOUS TRANSLUMINAL ANGIOPLASTY N/A 3/4/2021    Procedure: PTA (ANGIOPLASTY, PERCUTANEOUS, TRANSLUMINAL);  Surgeon: Teddy Huber MD;  Location: [facility] OR;  Service: Vascular;  Laterality: N/A;    REMOVAL OF ARTERIOVENOUS GRAFT Left 5/27/2021    Procedure: REMOVAL, GRAFT, LEFT LOWER EXTREMITY, WOUND EXPLORATION;  Surgeon: Teddy Huber MD;  Location: [facility] OR 2ND FLR;  Service: Vascular;  Laterality: Left;    REMOVAL OF NAIL OF DIGIT Left 3/9/2021    Procedure: REMOVAL, NAIL, DIGIT;  Surgeon: Rosio Mayes DPM;  Location: [facility] OR;  Service: Podiatry;  Laterality: Left;    THROMBECTOMY Left 3/4/2021    Procedure: THROMBECTOMY, LEFT LOWER EXTREMITY BYPASS GRAFT, ANGIOGRAM, POSSIBLE INTERVENTION, POSSIBLE LEFT LOWER EXTREMITY BYPASS;  Surgeon: Teddy Huber MD;  Location: [facility] OR;   Service: Vascular;  Laterality: Left;    THROMBECTOMY Left 4/29/2021    Procedure: GRAFT THROMBECTOMY, LEFT LOWER EXTREMITY;  Surgeon: Teddy Huber MD;  Location: [facility] OR [address] [place] [address];  Service: Vascular;  Laterality: Left;  14.5 min  1179.85 mGy  341.01 Gycm2  240 ml dye    THROMBECTOMY  10/22/2021    Procedure: THROMBECTOMY;  Surgeon: Saad Arenas MD;  Location: [facility] CATH LAB/EP;  Service: Cardiology;;       Review of patient's allergies indicates:   Allergen Reactions    Ciprofloxacin Itching    Contrast media      Kidney injury    Iodine      Kidney injury       No current facility-administered medications on file prior to encounter.     Current Outpatient Medications on File Prior to Encounter   Medication Sig    atorvastatin (LIPITOR) 80 MG tablet TAKE 1 TABLET(80 MG) BY MOUTH EVERY NIGHT    clopidogreL (PLAVIX) 75 mg tablet TAKE 1 TABLET(75 MG) BY MOUTH DAILY    furosemide (LASIX) 40 MG tablet TAKE 1 TABLET(40 MG) BY MOUTH EVERY DAY    NOVOLOG FLEXPEN U-100 INSULIN 100 unit/mL (3 mL) InPn pen INJECT 5 UNITS INTO SKIN THREE TIMES DAILY WITH MEALS PLUS SLIDING SCALE FOR MAX DAILY DOSE OF 25 UNITS, HOLD IF NOT EATING (Patient taking differently: 8 Units. INJECT 8 UNITS INTO SKIN THREE TIMES DAILY WITH MEALS PLUS SLIDING SCALE FOR MAX DAILY DOSE OF 25 UNITS, HOLD IF NOT EATING)    pantoprazole (PROTONIX) 40 MG tablet TAKE 1 TABLET(40 MG) BY MOUTH DAILY    sertraline (ZOLOFT) 50 MG tablet TAKE 1 TABLET(50 MG) BY MOUTH DAILY    allopurinoL (ZYLOPRIM) 100 MG tablet Take 1 tablet (100 mg total) by mouth once daily.    amitriptyline (ELAVIL) 10 MG tablet Take 1 tablet (10 mg total) by mouth nightly as needed for Pain.    aspirin (ECOTRIN) 81 MG EC tablet Take 81 mg by mouth once daily at 6am.    carvediloL (COREG) 3.125 MG tablet TAKE 1 TABLET(3.125 MG) BY MOUTH TWICE DAILY WITH BREAKFAST AND DINNER    docusate sodium (COLACE) 100 MG capsule Take 100 mg by mouth 2 (two) times daily.    insulin detemir U-100  (LEVEMIR FLEXTOUCH) 100 unit/mL (3 mL) SubQ InPn pen Inject 12 Units into the skin every evening.    insulin glargine (LANTUS) 100 unit/mL injection Inject 8 Units into the skin every evening.    losartan (COZAAR) 25 MG tablet Take 12.5 mg by mouth every evening.    melatonin (MELATIN) 3 mg tablet Take 2 tablets (6 mg total) by mouth nightly as needed for Insomnia.    multivit-min-FA-lycopen-lutein 0.4 mg-300 mcg- 250 mcg Tab Take 1 tablet by mouth once daily.    multivit/folic acid/vit K1 (ONE-A-DAY WOMEN'S 50 PLUS ORAL) Take 1 tablet by mouth Daily.    NIFEdipine (PROCARDIA-XL) 30 MG (OSM) 24 hr tablet Take 1 tablet (30 mg total) by mouth once daily.    pregabalin (LYRICA) 75 MG capsule Take 1 capsule (75 mg total) by mouth 3 (three) times daily.    rivaroxaban (XARELTO) 10 mg Tab Take 2.5 mg by mouth nightly.    sodium bicarbonate 650 MG tablet Take 2 tablets (1,300 mg total) by mouth 3 (three) times daily.    [DISCONTINUED] lancing device Misc 1 Device by Misc.(Non-Drug; Combo Route) route 2 (two) times daily with meals.     Family History       Problem Relation (Age of Onset)    Diabetes Mother, Father, Paternal Grandmother    Heart disease Maternal Grandmother    No Known Problems Maternal Grandfather, Paternal Grandfather          Tobacco Use    Smoking status: Former    Smokeless tobacco: Never   Substance and Sexual Activity    Alcohol use: No    Drug use: Yes     Types: Marijuana     Comment: occassional    Sexual activity: Yes     Partners: Male     Review of Systems   Constitutional:  Negative for chills, fatigue and fever.   HENT:  Negative for congestion, dental problem, facial swelling, postnasal drip, rhinorrhea, sore throat and trouble swallowing.    Eyes:  Negative for photophobia and pain.   Respiratory:  Positive for chest tightness and shortness of breath. Negative for cough, choking and wheezing.    Cardiovascular:  Positive for chest pain (pleuritic).   Gastrointestinal:  Negative for  abdominal distention, abdominal pain, diarrhea, nausea and vomiting.   Endocrine: Negative.    Genitourinary:  Negative for dysuria, flank pain, frequency and hematuria.   Musculoskeletal:  Negative for back pain.   Skin:  Negative for pallor and rash.   Allergic/Immunologic: Negative.    Neurological:  Negative for dizziness, syncope, weakness, numbness and headaches.   Psychiatric/Behavioral:  The patient is not nervous/anxious.    Objective:     Vital Signs (Most Recent):  Temp: 98.7 °F (37.1 °C) (05/01/23 1716)  Pulse: 101 (05/01/23 1731)  Resp: 18 (05/01/23 1731)  BP: (!) 162/92 (05/01/23 1731)  SpO2: 100 % (05/01/23 1731)   Vital Signs (24h Range):  Temp:  [98.7 °F (37.1 °C)-99.1 °F (37.3 °C)] 98.7 °F (37.1 °C)  Pulse:  [] 101  Resp:  [18-20] 18  SpO2:  [99 %-100 %] 100 %  BP: (143-162)/(76-92) 162/92     Weight: 90.3 kg (199 lb)  Body mass index is 33.12 kg/m².    Physical Exam  Vitals and nursing note reviewed.   Constitutional:       General: She is not in acute distress.     Appearance: Normal appearance. She is not ill-appearing or diaphoretic.   HENT:      Head: Normocephalic and atraumatic.      Right Ear: External ear normal.      Left Ear: External ear normal.      Nose: Nose normal.      Mouth/Throat:      Mouth: Mucous membranes are moist.      Pharynx: Oropharynx is clear.   Eyes:      General: No scleral icterus.     Extraocular Movements: Extraocular movements intact.      Conjunctiva/sclera: Conjunctivae normal.      Pupils: Pupils are equal, round, and reactive to light.   Cardiovascular:      Rate and Rhythm: Normal rate and regular rhythm.      Pulses: Normal pulses.      Heart sounds: Normal heart sounds. Murmur present.  Pulmonary:      Effort: Pulmonary effort is normal. No respiratory distress.      Breath sounds: Normal breath sounds. No stridor. No wheezing or rhonchi. No rales.  Chest:      Chest wall: No tenderness.   Abdominal:      General: Abdomen is flat. There is no  distension.      Palpations: Abdomen is soft.      Tenderness: There is no abdominal tenderness. There is no guarding or rebound.   Musculoskeletal:         General: No swelling or tenderness. Normal range of motion.      Cervical back: Normal range of motion and neck supple. No rigidity or tenderness.       2+ pitting edema to sternum  Skin:     General: Skin is warm and dry.      Capillary Refill: Capillary refill takes less than 2 seconds.      Coloration: Skin is not jaundiced.      Findings: No erythema.   Neurological:      General: No focal deficit present.      Mental Status: She is alert and oriented to person, place, and time.      Cranial Nerves: No cranial nerve deficit.      Motor: No weakness.   Psychiatric:         Mood and Affect: Mood normal.         Behavior: Behavior normal.        Significant Labs: All pertinent labs within the past 24 hours have been reviewed.    Significant Imaging: I have reviewed all pertinent imaging results/findings within the past 24 hours.    Assessment/Plan:     * Acute on chronic combined systolic and diastolic heart failure  Last Echo 6/21 showing    The estimated ejection fraction is 55%.  The left ventricle is normal in size with normal systolic function.  Normal left ventricular diastolic function.  Normal right ventricular size with normal right ventricular systolic function.  Mild tricuspid regurgitation.  The estimated PA systolic pressure is 31 mmHg.  Normal central venous pressure (3 mmHg).       -Repeat Echo Cardiogram  -Trend troponins to peak  -Aggressive IV Diuresis  -Qq4 BMPs with electrolyte repletion as needed      ROSLYN (acute kidney injury)  Pt with increased Cr to 3.9 from 2.0 baseline. Possibly due to renovascular congestion from volume overload.     -Kerns  -Strict Is/Os  -Avoid nephrotoxic agents  -Lasix 40 IV BID  -Daily CMPs        Acute hypoxemic respiratory failure  See Acute on chronic CHF    CKD (chronic kidney disease), stage IV  History of  CKD. See ROSLYN.      Above-knee amputation of right lower extremity  Prior history consistent with exam      Uses self-applied continuous glucose monitoring device  POCT glucose and daily CMPs    -Restart insulin for hyperglycemia      Dermatitis associated with moisture  Large body habitus with dermatitis to folds. Will monitor for infection.    -Zinc based powder  -Topical abx if indicated      Status post above knee amputation, left  History of.      Hypoalbuminemia  Pt with poor PO intake reported. Pt eats one meal a day and has associated nausea and vomiting. Will try antiemetics before feeds to improve intake. Will monitor daily intake and calorie replacement.     -Calorie count  -Diabetic/Renal diet  -Antiemetics before meals; Zofran 4 IV PRN  -Monitor Qtc for prolongation with antiemetics      Impaired functional mobility and endurance  PT/OT for bedbound patient      Paroxysmal atrial fibrillation  Continue home rate control  NSR on EKG        Anemia  Asymptomatic. Higher than 8 months ago. No signs of acute bleed at this time.     -CBC daily  -Transfuse if <7        S/P CABG x 1  History of. Sternal scar well healed.       CAD (coronary artery disease)  History of CAD with retrosternal chest pain. EKG negative to ST changes. Nonspecific T wave inversions.     -Cardiac tele  -EKG prn  -Trend troponins to peak        CAROLIN (generalized anxiety disorder)  Continue home anxiety medications      Essential hypertension  Continue home antihypertensives      PAD (peripheral artery disease)  History of bilateral AKA.           VTE Risk Mitigation (From admission, onward)      None               Onur Bueno MD  Department of Hospital Medicine  [facility] - Emergency Dept                      05/02/2023                             STAFF PHYSICIAN NOTE                                   Attending Attestation for Rounds with Resident  I have reviewed and concur with the resident's history, physical, assessment, and plan.   I have personally interviewed and examined the patient at bedside and agree with the resident's findings.              57 y.o. female with past medical history of CAD s/p CABG, CKD, DM, HTN, HLD, s/p bilateral AKA who presnets with chest pain, difficulty breathing, leg swelling. Diruesing fort CHF exacerbation       Flavia Delgado MD  Senior Hospitalist  Department of Hospital Medicine  [facility] [address], [phone]

## 2023-05-01 NOTE — ASSESSMENT & PLAN NOTE
Pt with increased Cr to 3.9 from 2.0 baseline. Possibly due to renovascular congestion from volume overload.     -Kerns  -Strict Is/Os  -Avoid nephrotoxic agents  -Lasix 40 IV BID  -Daily CMPs

## 2023-05-01 NOTE — ASSESSMENT & PLAN NOTE
Pt with poor PO intake reported. Pt eats one meal a day and has associated nausea and vomiting. Will try antiemetics before feeds to improve intake. Will monitor daily intake and calorie replacement.     -Calorie count  -Diabetic/Renal diet  -Antiemetics before meals; Zofran 4 IV PRN  -Monitor Qtc for prolongation with antiemetics

## 2023-05-01 NOTE — ASSESSMENT & PLAN NOTE
Asymptomatic. Higher than 8 months ago. No signs of acute bleed at this time.     -CBC daily  -Transfuse if <7

## 2023-05-02 ENCOUNTER — TELEPHONE (OUTPATIENT)
Dept: ENDOCRINOLOGY | Facility: CLINIC | Age: 58
End: 2023-05-02
Payer: MEDICARE

## 2023-05-02 LAB
ALBUMIN SERPL BCP-MCNC: 2 G/DL (ref 3.5–5.2)
ALBUMIN SERPL BCP-MCNC: 2.2 G/DL (ref 3.5–5.2)
ALP SERPL-CCNC: 218 U/L (ref 55–135)
ALP SERPL-CCNC: 223 U/L (ref 55–135)
ALT SERPL W/O P-5'-P-CCNC: 36 U/L (ref 10–44)
ALT SERPL W/O P-5'-P-CCNC: 42 U/L (ref 10–44)
ANION GAP SERPL CALC-SCNC: 10 MMOL/L (ref 8–16)
ANION GAP SERPL CALC-SCNC: 11 MMOL/L (ref 8–16)
ASCENDING AORTA: 3.34 CM
AST SERPL-CCNC: 27 U/L (ref 10–40)
AST SERPL-CCNC: 35 U/L (ref 10–40)
AV INDEX (PROSTH): 0.56
AV MEAN GRADIENT: 3 MMHG
AV PEAK GRADIENT: 6 MMHG
AV VALVE AREA: 1.73 CM2
AV VELOCITY RATIO: 0.64
BASOPHILS # BLD AUTO: 0.08 K/UL (ref 0–0.2)
BASOPHILS NFR BLD: 1.5 % (ref 0–1.9)
BILIRUB SERPL-MCNC: 0.3 MG/DL (ref 0.1–1)
BILIRUB SERPL-MCNC: 0.4 MG/DL (ref 0.1–1)
BSA FOR ECHO PROCEDURE: 2.09 M2
BUN SERPL-MCNC: 56 MG/DL (ref 6–20)
BUN SERPL-MCNC: 61 MG/DL (ref 6–20)
CALCIUM SERPL-MCNC: 7.1 MG/DL (ref 8.7–10.5)
CALCIUM SERPL-MCNC: 7.4 MG/DL (ref 8.7–10.5)
CHLORIDE SERPL-SCNC: 109 MMOL/L (ref 95–110)
CHLORIDE SERPL-SCNC: 110 MMOL/L (ref 95–110)
CO2 SERPL-SCNC: 18 MMOL/L (ref 23–29)
CO2 SERPL-SCNC: 19 MMOL/L (ref 23–29)
CREAT SERPL-MCNC: 3.6 MG/DL (ref 0.5–1.4)
CREAT SERPL-MCNC: 3.8 MG/DL (ref 0.5–1.4)
CV ECHO LV RWT: 0.28 CM
DIFFERENTIAL METHOD: ABNORMAL
DOP CALC AO PEAK VEL: 1.21 M/S
DOP CALC AO VTI: 24.23 CM
DOP CALC LVOT AREA: 3.1 CM2
DOP CALC LVOT DIAMETER: 1.98 CM
DOP CALC LVOT PEAK VEL: 0.77 M/S
DOP CALC LVOT STROKE VOLUME: 41.95 CM3
DOP CALCLVOT PEAK VEL VTI: 13.63 CM
E WAVE DECELERATION TIME: 131.42 MSEC
E/A RATIO: 1.31
E/E' RATIO: 11.64 M/S
ECHO LV POSTERIOR WALL: 0.76 CM (ref 0.6–1.1)
EJECTION FRACTION: 15 %
EOSINOPHIL # BLD AUTO: 0.2 K/UL (ref 0–0.5)
EOSINOPHIL NFR BLD: 3.7 % (ref 0–8)
ERYTHROCYTE [DISTWIDTH] IN BLOOD BY AUTOMATED COUNT: 15.4 % (ref 11.5–14.5)
EST. GFR  (NO RACE VARIABLE): 13.3 ML/MIN/1.73 M^2
EST. GFR  (NO RACE VARIABLE): 14.2 ML/MIN/1.73 M^2
ESTIMATED AVG GLUCOSE: 131 MG/DL (ref 68–131)
FRACTIONAL SHORTENING: 12 % (ref 28–44)
GLUCOSE SERPL-MCNC: 120 MG/DL (ref 70–110)
GLUCOSE SERPL-MCNC: 81 MG/DL (ref 70–110)
HBA1C MFR BLD: 6.2 % (ref 4–5.6)
HCT VFR BLD AUTO: 33.8 % (ref 37–48.5)
HGB BLD-MCNC: 9.9 G/DL (ref 12–16)
IMM GRANULOCYTES # BLD AUTO: 0.01 K/UL (ref 0–0.04)
IMM GRANULOCYTES NFR BLD AUTO: 0.2 % (ref 0–0.5)
INTERVENTRICULAR SEPTUM: 0.89 CM (ref 0.6–1.1)
LA MAJOR: 6.5 CM
LA MINOR: 5.59 CM
LA WIDTH: 4.6 CM
LEFT ATRIUM SIZE: 4.33 CM
LEFT ATRIUM VOLUME INDEX MOD: 35.6 ML/M2
LEFT ATRIUM VOLUME INDEX: 50.4 ML/M2
LEFT ATRIUM VOLUME MOD: 72.01 CM3
LEFT ATRIUM VOLUME: 101.76 CM3
LEFT INTERNAL DIMENSION IN SYSTOLE: 4.79 CM (ref 2.1–4)
LEFT VENTRICLE DIASTOLIC VOLUME INDEX: 70.94 ML/M2
LEFT VENTRICLE DIASTOLIC VOLUME: 143.29 ML
LEFT VENTRICLE MASS INDEX: 81 G/M2
LEFT VENTRICLE SYSTOLIC VOLUME INDEX: 53 ML/M2
LEFT VENTRICLE SYSTOLIC VOLUME: 107.1 ML
LEFT VENTRICULAR INTERNAL DIMENSION IN DIASTOLE: 5.43 CM (ref 3.5–6)
LEFT VENTRICULAR MASS: 162.67 G
LV LATERAL E/E' RATIO: 10.67 M/S
LV SEPTAL E/E' RATIO: 12.8 M/S
LYMPHOCYTES # BLD AUTO: 1.3 K/UL (ref 1–4.8)
LYMPHOCYTES NFR BLD: 23 % (ref 18–48)
MAGNESIUM SERPL-MCNC: 1.8 MG/DL (ref 1.6–2.6)
MCH RBC QN AUTO: 29.7 PG (ref 27–31)
MCHC RBC AUTO-ENTMCNC: 29.3 G/DL (ref 32–36)
MCV RBC AUTO: 102 FL (ref 82–98)
MONOCYTES # BLD AUTO: 0.7 K/UL (ref 0.3–1)
MONOCYTES NFR BLD: 12.1 % (ref 4–15)
MV A" WAVE DURATION": 16.56 MSEC
MV PEAK A VEL: 0.49 M/S
MV PEAK E VEL: 0.64 M/S
MV STENOSIS PRESSURE HALF TIME: 38.11 MS
MV VALVE AREA P 1/2 METHOD: 5.77 CM2
NEUTROPHILS # BLD AUTO: 3.3 K/UL (ref 1.8–7.7)
NEUTROPHILS NFR BLD: 59.5 % (ref 38–73)
NRBC BLD-RTO: 0 /100 WBC
PHOSPHATE SERPL-MCNC: 6.5 MG/DL (ref 2.7–4.5)
PISA TR MAX VEL: 2.65 M/S
PLATELET # BLD AUTO: 297 K/UL (ref 150–450)
PMV BLD AUTO: 10.5 FL (ref 9.2–12.9)
POCT GLUCOSE: 120 MG/DL (ref 70–110)
POCT GLUCOSE: 127 MG/DL (ref 70–110)
POCT GLUCOSE: 137 MG/DL (ref 70–110)
POCT GLUCOSE: 74 MG/DL (ref 70–110)
POTASSIUM SERPL-SCNC: 4.3 MMOL/L (ref 3.5–5.1)
POTASSIUM SERPL-SCNC: 4.5 MMOL/L (ref 3.5–5.1)
PROT SERPL-MCNC: 6 G/DL (ref 6–8.4)
PROT SERPL-MCNC: 6.2 G/DL (ref 6–8.4)
PULM VEIN S/D RATIO: 0.79
PV PEAK D VEL: 0.34 M/S
PV PEAK S VEL: 0.27 M/S
RA MAJOR: 5.14 CM
RA PRESSURE: 15 MMHG
RA WIDTH: 3.91 CM
RBC # BLD AUTO: 3.33 M/UL (ref 4–5.4)
RIGHT VENTRICULAR END-DIASTOLIC DIMENSION: 4.13 CM
RV TISSUE DOPPLER FREE WALL SYSTOLIC VELOCITY 1 (APICAL 4 CHAMBER VIEW): 5.92 CM/S
SINUS: 3.01 CM
SODIUM SERPL-SCNC: 138 MMOL/L (ref 136–145)
SODIUM SERPL-SCNC: 139 MMOL/L (ref 136–145)
STJ: 2.58 CM
TDI LATERAL: 0.06 M/S
TDI SEPTAL: 0.05 M/S
TDI: 0.06 M/S
TR MAX PG: 28 MMHG
TRICUSPID ANNULAR PLANE SYSTOLIC EXCURSION: 1.31 CM
TV REST PULMONARY ARTERY PRESSURE: 43 MMHG
WBC # BLD AUTO: 5.47 K/UL (ref 3.9–12.7)

## 2023-05-02 PROCEDURE — 25500020 PHARM REV CODE 255: Mod: HCNC | Performed by: HOSPITALIST

## 2023-05-02 PROCEDURE — 25000003 PHARM REV CODE 250: Mod: HCNC

## 2023-05-02 PROCEDURE — 96372 THER/PROPH/DIAG INJ SC/IM: CPT | Performed by: STUDENT IN AN ORGANIZED HEALTH CARE EDUCATION/TRAINING PROGRAM

## 2023-05-02 PROCEDURE — 99223 PR INITIAL HOSPITAL CARE,LEVL III: ICD-10-PCS | Mod: HCNC,,, | Performed by: HOSPITALIST

## 2023-05-02 PROCEDURE — 99232 PR SUBSEQUENT HOSPITAL CARE,LEVL II: ICD-10-PCS | Mod: HCNC,,, | Performed by: INTERNAL MEDICINE

## 2023-05-02 PROCEDURE — 99223 1ST HOSP IP/OBS HIGH 75: CPT | Mod: HCNC,,, | Performed by: HOSPITALIST

## 2023-05-02 PROCEDURE — 80053 COMPREHEN METABOLIC PANEL: CPT | Mod: 91,HCNC | Performed by: STUDENT IN AN ORGANIZED HEALTH CARE EDUCATION/TRAINING PROGRAM

## 2023-05-02 PROCEDURE — 63600175 PHARM REV CODE 636 W HCPCS: Mod: HCNC | Performed by: STUDENT IN AN ORGANIZED HEALTH CARE EDUCATION/TRAINING PROGRAM

## 2023-05-02 PROCEDURE — 25000003 PHARM REV CODE 250: Mod: HCNC | Performed by: STUDENT IN AN ORGANIZED HEALTH CARE EDUCATION/TRAINING PROGRAM

## 2023-05-02 PROCEDURE — 36415 COLL VENOUS BLD VENIPUNCTURE: CPT | Mod: HCNC | Performed by: STUDENT IN AN ORGANIZED HEALTH CARE EDUCATION/TRAINING PROGRAM

## 2023-05-02 PROCEDURE — 85025 COMPLETE CBC W/AUTO DIFF WBC: CPT | Mod: HCNC | Performed by: STUDENT IN AN ORGANIZED HEALTH CARE EDUCATION/TRAINING PROGRAM

## 2023-05-02 PROCEDURE — 84100 ASSAY OF PHOSPHORUS: CPT | Mod: HCNC | Performed by: STUDENT IN AN ORGANIZED HEALTH CARE EDUCATION/TRAINING PROGRAM

## 2023-05-02 PROCEDURE — 83036 HEMOGLOBIN GLYCOSYLATED A1C: CPT | Mod: HCNC | Performed by: STUDENT IN AN ORGANIZED HEALTH CARE EDUCATION/TRAINING PROGRAM

## 2023-05-02 PROCEDURE — 83735 ASSAY OF MAGNESIUM: CPT | Mod: HCNC | Performed by: STUDENT IN AN ORGANIZED HEALTH CARE EDUCATION/TRAINING PROGRAM

## 2023-05-02 PROCEDURE — 99232 SBSQ HOSP IP/OBS MODERATE 35: CPT | Mod: HCNC,,, | Performed by: INTERNAL MEDICINE

## 2023-05-02 PROCEDURE — G0378 HOSPITAL OBSERVATION PER HR: HCPCS | Mod: HCNC

## 2023-05-02 RX ORDER — ASPIRIN 81 MG/1
81 TABLET ORAL DAILY
Qty: 90 TABLET | Refills: 3 | Status: SHIPPED | OUTPATIENT
Start: 2023-05-03 | End: 2023-05-02 | Stop reason: HOSPADM

## 2023-05-02 RX ORDER — FUROSEMIDE 20 MG/1
80 TABLET ORAL
Status: DISCONTINUED | OUTPATIENT
Start: 2023-05-02 | End: 2023-05-03

## 2023-05-02 RX ORDER — SODIUM BICARBONATE 650 MG/1
1300 TABLET ORAL 3 TIMES DAILY
Qty: 180 TABLET | Refills: 11 | Status: SHIPPED | OUTPATIENT
Start: 2023-05-02 | End: 2023-05-25 | Stop reason: HOSPADM

## 2023-05-02 RX ORDER — CLOPIDOGREL BISULFATE 75 MG/1
75 TABLET ORAL DAILY
Qty: 90 TABLET | Refills: 3 | Status: SHIPPED | OUTPATIENT
Start: 2023-05-03 | End: 2023-12-26 | Stop reason: SDUPTHER

## 2023-05-02 RX ORDER — TALC
6 POWDER (GRAM) TOPICAL NIGHTLY PRN
Status: DISCONTINUED | OUTPATIENT
Start: 2023-05-02 | End: 2023-05-29 | Stop reason: HOSPADM

## 2023-05-02 RX ORDER — SERTRALINE HYDROCHLORIDE 50 MG/1
50 TABLET, FILM COATED ORAL DAILY
Qty: 90 TABLET | Refills: 3 | Status: SHIPPED | OUTPATIENT
Start: 2023-05-03 | End: 2023-12-26

## 2023-05-02 RX ORDER — ATORVASTATIN CALCIUM 80 MG/1
80 TABLET, FILM COATED ORAL DAILY
Qty: 90 TABLET | Refills: 3 | Status: SHIPPED | OUTPATIENT
Start: 2023-05-03 | End: 2023-12-26 | Stop reason: SDUPTHER

## 2023-05-02 RX ORDER — FUROSEMIDE 20 MG/1
40 TABLET ORAL DAILY
Status: DISCONTINUED | OUTPATIENT
Start: 2023-05-02 | End: 2023-05-02

## 2023-05-02 RX ADMIN — ASPIRIN 81 MG: 81 TABLET, COATED ORAL at 09:05

## 2023-05-02 RX ADMIN — DOCUSATE SODIUM 100 MG: 100 CAPSULE, LIQUID FILLED ORAL at 08:05

## 2023-05-02 RX ADMIN — FUROSEMIDE 40 MG: 20 TABLET ORAL at 09:05

## 2023-05-02 RX ADMIN — CARVEDILOL 3.12 MG: 3.12 TABLET, FILM COATED ORAL at 09:05

## 2023-05-02 RX ADMIN — SODIUM BICARBONATE 1300 MG: 650 TABLET ORAL at 04:05

## 2023-05-02 RX ADMIN — SERTRALINE HYDROCHLORIDE 50 MG: 50 TABLET ORAL at 09:05

## 2023-05-02 RX ADMIN — ATORVASTATIN CALCIUM 80 MG: 40 TABLET, FILM COATED ORAL at 09:05

## 2023-05-02 RX ADMIN — INSULIN DETEMIR 8 UNITS: 100 INJECTION, SOLUTION SUBCUTANEOUS at 08:05

## 2023-05-02 RX ADMIN — INSULIN ASPART 3 UNITS: 100 INJECTION, SOLUTION INTRAVENOUS; SUBCUTANEOUS at 12:05

## 2023-05-02 RX ADMIN — DOCUSATE SODIUM 100 MG: 100 CAPSULE, LIQUID FILLED ORAL at 09:05

## 2023-05-02 RX ADMIN — Medication 6 MG: at 12:05

## 2023-05-02 RX ADMIN — CARVEDILOL 3.12 MG: 3.12 TABLET, FILM COATED ORAL at 04:05

## 2023-05-02 RX ADMIN — HUMAN ALBUMIN MICROSPHERES AND PERFLUTREN 0.11 MG: 10; .22 INJECTION, SOLUTION INTRAVENOUS at 09:05

## 2023-05-02 RX ADMIN — ALLOPURINOL 100 MG: 100 TABLET ORAL at 09:05

## 2023-05-02 RX ADMIN — SODIUM BICARBONATE 1300 MG: 650 TABLET ORAL at 09:05

## 2023-05-02 RX ADMIN — SODIUM BICARBONATE 1300 MG: 650 TABLET ORAL at 08:05

## 2023-05-02 RX ADMIN — FUROSEMIDE 80 MG: 20 TABLET ORAL at 08:05

## 2023-05-02 RX ADMIN — INSULIN ASPART 3 UNITS: 100 INJECTION, SOLUTION INTRAVENOUS; SUBCUTANEOUS at 04:05

## 2023-05-02 RX ADMIN — PANTOPRAZOLE SODIUM 40 MG: 40 TABLET, DELAYED RELEASE ORAL at 09:05

## 2023-05-02 RX ADMIN — FUROSEMIDE 40 MG: 10 INJECTION, SOLUTION INTRAMUSCULAR; INTRAVENOUS at 05:05

## 2023-05-02 RX ADMIN — CLOPIDOGREL BISULFATE 75 MG: 75 TABLET ORAL at 09:05

## 2023-05-02 NOTE — SUBJECTIVE & OBJECTIVE
Past Medical History:   Diagnosis Date    Anxiety     Chronic pain syndrome     CKD (chronic kidney disease), stage III     Depression     Diabetes mellitus     type 2    Diabetes mellitus, type 2     GERD (gastroesophageal reflux disease)     Hyperemesis 3/23/2021    Hyperlipemia     Hypertension     Hypokalemia 3/23/2021    Infection of below knee amputation stump 3/12/2022    Myocardial infarction 2010    minor-caused by stress per pt.    Osteomyelitis     Osteomyelitis of left foot 4/30/2021    PVD (peripheral vascular disease)     Ulcer of left foot     Vaginal delivery     x1       Past Surgical History:   Procedure Laterality Date    ABOVE-KNEE AMPUTATION Left 5/18/2021    Procedure: AMPUTATION, ABOVE KNEE;  Surgeon: Teddy Huber MD;  Location: [facility] OR [address] [place] [address];  Service: Vascular;  Laterality: Left;    ABOVE-KNEE AMPUTATION Right 3/18/2022    Procedure: AMPUTATION, ABOVE KNEE;  Surgeon: DAYNE Florez II, MD;  Location: [facility] OR [address] [place] [address];  Service: Vascular;  Laterality: Right;    Angiogram - Right Extremity Right 7/9/15    angiogram-left leg  10/6/15    ANGIOGRAPHY OF LOWER EXTREMITY Left 4/29/2021    Procedure: ANGIOGRAM, LOWER EXTREMITY;  Surgeon: Teddy Huber MD;  Location: [facility] OR [address] [place] [address];  Service: Vascular;  Laterality: Left;    BELOW KNEE AMPUTATION OF LOWER EXTREMITY Right 12/28/2021    Procedure: AMPUTATION, BELOW KNEE;  Surgeon: Kaitlyn Rojas MD;  Location: [facility];  Service: General;  Laterality: Right;    CATHETERIZATION OF BOTH LEFT AND RIGHT HEART N/A 12/18/2019    Procedure: CATHETERIZATION, HEART, BOTH LEFT AND RIGHT;  Surgeon: Que Fernando III, MD;  Location: [facility] CATH LAB;  Service: Cardiology;  Laterality: N/A;    CORONARY ANGIOGRAPHY N/A 12/18/2019    Procedure: ANGIOGRAM, CORONARY ARTERY;  Surgeon: Que Fernando III, MD;  Location: [facility] CATH LAB;  Service: Cardiology;  Laterality: N/A;    CORONARY ANGIOGRAPHY INCLUDING BYPASS GRAFTS WITH CATHETERIZATION  OF LEFT HEART N/A 7/28/2020    Procedure: ANGIOGRAM, CORONARY, INCLUDING BYPASS GRAFT, WITH LEFT HEART CATHETERIZATION, 9 am;  Surgeon: Rachel Easley MD;  Location: [facility] CATH LAB;  Service: Cardiology;  Laterality: N/A;    CORONARY ARTERY BYPASS GRAFT (CABG) N/A 1/14/2020    Procedure: CORONARY ARTERY BYPASS GRAFT (CABG) x 1     Off Pump;  Surgeon: Huang Altamirano MD;  Location: [facility] OR [address] [place] [address];  Service: Cardiovascular;  Laterality: N/A;    CREATION OF FEMORAL-TIBIAL ARTERY BYPASS Left 4/29/2021    Procedure: CREATION, BYPASS, ARTERIAL, FEMORAL TO ANTERIOR TIBIAL;  Surgeon: Teddy Huber MD;  [facility];  Service: Vascular;  Laterality: Left;    CREATION OF FEMOROPOPLITEAL ARTERIAL BYPASS USING GRAFT Left 8/18/2020    Procedure: CREATION, BYPASS, ARTERIAL, FEMORAL TO POPLITEAL, USING GRAFT, LEFT LOWER EXTREMITY;  Surgeon: Teddy Huber MD;  Location: [facility] OR;  Service: Vascular;  Laterality: Left;  REQUEST 7:15 A.M. START----COVID NEGATIVE ON 8/17  1ST CASE STARTE PER LEANA ON 8/7/2020 @ 942AM-  RN PREOP 8/12/2020   T/S-----CLEARED BY CARDS-------PENDING INSURANCE    DEBRIDEMENT OF FOOT Left 9/8/2020    Procedure: DEBRIDEMENT, FOOT;  Surgeon: Rosio Mayes DPM;  Location: [facility] OR;  Service: Podiatry;  Laterality: Left;  request neoxx .   RN Pre Op 9-4-2020, Covid negative on 9/5/20. C A    DEBRIDEMENT OF FOOT  3/4/2021    Procedure: DEBRIDEMENT, FOOT;  Surgeon: Teddy Huber MD;  Location: [facility] OR;  Service: Vascular;;    DEBRIDEMENT OF FOOT Left 3/9/2021    Procedure: DEBRIDEMENT, FOOT, bone biopsy;  Surgeon: Rosio Mayes DPM;  Location: [facility] OR;  Service: Podiatry;  Laterality: Left;  Request neoxx---COVID IN AM  REQUESTING NOON START  RN Phone Pre op.On Blood thinners Plavix and Eliquis.  Covid am of surgery. C A    DEBRIDEMENT OF FOOT Left 5/4/2021    Procedure: DEBRIDEMENT, FOOT;  Surgeon: Farooq Morley DPM;  Location: [facility] OR [address] [place] [address];  Service:  Podiatry;  Laterality: Left;    INSERTION OF TUNNELED CENTRAL VENOUS HEMODIALYSIS CATHETER N/A 1/27/2020    Procedure: Insertion, Catheter, Central Venous, Hemodialysis;  Surgeon: ESTEBAN Gomez III, MD;  Location: [facility] CATH LAB;  Service: Peripheral Vascular;  Laterality: N/A;    PERCUTANEOUS TRANSLUMINAL ANGIOPLASTY N/A 3/4/2021    Procedure: PTA (ANGIOPLASTY, PERCUTANEOUS, TRANSLUMINAL);  Surgeon: Teddy Huber MD;  Location: [facility] OR;  Service: Vascular;  Laterality: N/A;    REMOVAL OF ARTERIOVENOUS GRAFT Left 5/27/2021    Procedure: REMOVAL, GRAFT, LEFT LOWER EXTREMITY, WOUND EXPLORATION;  Surgeon: Teddy Huber MD;  Location: [facility] OR 2ND FLR;  Service: Vascular;  Laterality: Left;    REMOVAL OF NAIL OF DIGIT Left 3/9/2021    Procedure: REMOVAL, NAIL, DIGIT;  Surgeon: Rosio Mayes DPM;  Location: [facility] OR;  Service: Podiatry;  Laterality: Left;    THROMBECTOMY Left 3/4/2021    Procedure: THROMBECTOMY, LEFT LOWER EXTREMITY BYPASS GRAFT, ANGIOGRAM, POSSIBLE INTERVENTION, POSSIBLE LEFT LOWER EXTREMITY BYPASS;  Surgeon: Teddy Huber MD;  Location: [facility] OR;  Service: Vascular;  Laterality: Left;    THROMBECTOMY Left 4/29/2021    Procedure: GRAFT THROMBECTOMY, LEFT LOWER EXTREMITY;  Surgeon: Teddy Huber MD;  Location: [facility] OR 2ND FLR;  Service: Vascular;  Laterality: Left;  14.5 min  1179.85 mGy  341.01 Gycm2  240 ml dye    THROMBECTOMY  10/22/2021    Procedure: THROMBECTOMY;  Surgeon: Saad Arenas MD;  Location: [facility] CATH LAB/EP;  Service: Cardiology;;       Review of patient's allergies indicates:   Allergen Reactions    Ciprofloxacin Itching    Contrast media      Kidney injury    Iodine      Kidney injury       No current facility-administered medications on file prior to encounter.     Current Outpatient Medications on File Prior to Encounter   Medication Sig    furosemide (LASIX) 40 MG tablet TAKE 1 TABLET(40 MG) BY MOUTH EVERY DAY    [DISCONTINUED] atorvastatin (LIPITOR) 80  MG tablet TAKE 1 TABLET(80 MG) BY MOUTH EVERY NIGHT    [DISCONTINUED] clopidogreL (PLAVIX) 75 mg tablet TAKE 1 TABLET(75 MG) BY MOUTH DAILY    [DISCONTINUED] NOVOLOG FLEXPEN U-100 INSULIN 100 unit/mL (3 mL) InPn pen INJECT 5 UNITS INTO SKIN THREE TIMES DAILY WITH MEALS PLUS SLIDING SCALE FOR MAX DAILY DOSE OF 25 UNITS, HOLD IF NOT EATING (Patient taking differently: 8 Units. INJECT 8 UNITS INTO SKIN THREE TIMES DAILY WITH MEALS PLUS SLIDING SCALE FOR MAX DAILY DOSE OF 25 UNITS, HOLD IF NOT EATING)    [DISCONTINUED] pantoprazole (PROTONIX) 40 MG tablet TAKE 1 TABLET(40 MG) BY MOUTH DAILY    [DISCONTINUED] sertraline (ZOLOFT) 50 MG tablet TAKE 1 TABLET(50 MG) BY MOUTH DAILY    [DISCONTINUED] allopurinoL (ZYLOPRIM) 100 MG tablet Take 1 tablet (100 mg total) by mouth once daily.    [DISCONTINUED] amitriptyline (ELAVIL) 10 MG tablet Take 1 tablet (10 mg total) by mouth nightly as needed for Pain.    [DISCONTINUED] aspirin (ECOTRIN) 81 MG EC tablet Take 81 mg by mouth once daily at 6am.    [DISCONTINUED] carvediloL (COREG) 3.125 MG tablet TAKE 1 TABLET(3.125 MG) BY MOUTH TWICE DAILY WITH BREAKFAST AND DINNER    [DISCONTINUED] docusate sodium (COLACE) 100 MG capsule Take 100 mg by mouth 2 (two) times daily.    [DISCONTINUED] insulin detemir U-100 (LEVEMIR FLEXTOUCH) 100 unit/mL (3 mL) SubQ InPn pen Inject 12 Units into the skin every evening.    [DISCONTINUED] insulin glargine (LANTUS) 100 unit/mL injection Inject 8 Units into the skin every evening.    [DISCONTINUED] lancing device Misc 1 Device by Misc.(Non-Drug; Combo Route) route 2 (two) times daily with meals.    [DISCONTINUED] losartan (COZAAR) 25 MG tablet Take 12.5 mg by mouth every evening.    [DISCONTINUED] melatonin (MELATIN) 3 mg tablet Take 2 tablets (6 mg total) by mouth nightly as needed for Insomnia.    [DISCONTINUED] multivit-min-FA-lycopen-lutein 0.4 mg-300 mcg- 250 mcg Tab Take 1 tablet by mouth once daily.    [DISCONTINUED] multivit/folic acid/vit K1  (ONE-A-DAY WOMEN'S 50 PLUS ORAL) Take 1 tablet by mouth Daily.    [DISCONTINUED] NIFEdipine (PROCARDIA-XL) 30 MG (OSM) 24 hr tablet Take 1 tablet (30 mg total) by mouth once daily.    [DISCONTINUED] pregabalin (LYRICA) 75 MG capsule Take 1 capsule (75 mg total) by mouth 3 (three) times daily.    [DISCONTINUED] rivaroxaban (XARELTO) 10 mg Tab Take 2.5 mg by mouth nightly.    [DISCONTINUED] sodium bicarbonate 650 MG tablet Take 2 tablets (1,300 mg total) by mouth 3 (three) times daily.     Family History       Problem Relation (Age of Onset)    Diabetes Mother, Father, Paternal Grandmother    Heart disease Maternal Grandmother    No Known Problems Maternal Grandfather, Paternal Grandfather          Tobacco Use    Smoking status: Former    Smokeless tobacco: Never   Substance and Sexual Activity    Alcohol use: No    Drug use: Yes     Types: Marijuana     Comment: occassional    Sexual activity: Yes     Partners: Male     Review of Systems   Cardiovascular:  Positive for chest pain, dyspnea on exertion and orthopnea. Negative for palpitations and syncope.   Respiratory:  Positive for shortness of breath. Negative for cough, hemoptysis, sputum production and wheezing.    Objective:     Vital Signs (Most Recent):  Temp: 97.7 °F (36.5 °C) (05/02/23 1215)  Pulse: 78 (05/02/23 1215)  Resp: 20 (05/02/23 1215)  BP: (!) 147/58 (05/02/23 1215)  SpO2: 97 % (05/02/23 1215)   Vital Signs (24h Range):  Temp:  [97.4 °F (36.3 °C)-99.1 °F (37.3 °C)] 97.7 °F (36.5 °C)  Pulse:  [] 78  Resp:  [18-20] 20  SpO2:  [97 %-100 %] 97 %  BP: (138-175)/(58-97) 147/58     Weight: 95.3 kg (210 lb)  Body mass index is 34.95 kg/m².    SpO2: 97 %         Intake/Output Summary (Last 24 hours) at 5/2/2023 1422  Last data filed at 5/2/2023 0612  Gross per 24 hour   Intake 200 ml   Output 900 ml   Net -700 ml       Lines/Drains/Airways       Peripheral Intravenous Line  Duration                  External Jugular IV 08/13/22 1735 261 days          Peripheral IV - Single Lumen 05/01/23 1604 20 G Posterior;Right Hand <1 day                    Physical Exam  Vitals reviewed.   Constitutional:       General: She is not in acute distress.     Appearance: She is obese. She is not ill-appearing or toxic-appearing.   HENT:      Mouth/Throat:      Mouth: Mucous membranes are moist.      Pharynx: No oropharyngeal exudate.   Neck:      Comments: JVD elevated  Cardiovascular:      Rate and Rhythm: Normal rate and regular rhythm.   Pulmonary:      Effort: Pulmonary effort is normal.      Comments: Bilateral crackles noted on exam  Abdominal:      General: Bowel sounds are normal. There is distension.      Palpations: Abdomen is soft.      Tenderness: There is no abdominal tenderness. There is no guarding.   Musculoskeletal:      Right lower leg: Edema present.      Left lower leg: Edema present.      Comments: Patient with bilateral AKA. Edema of thighs   Skin:     General: Skin is warm.      Coloration: Skin is not jaundiced.      Findings: No bruising.   Neurological:      General: No focal deficit present.      Mental Status: She is alert and oriented to person, place, and time. Mental status is at baseline.   Psychiatric:         Mood and Affect: Mood is depressed.       Significant Labs: All pertinent lab results from the last 24 hours have been reviewed.    Significant Imaging: Echocardiogram: Transthoracic echo (TTE) complete (Cupid Only):   Results for orders placed or performed during the hospital encounter of 05/01/23   Echo   Result Value Ref Range    Ascending aorta 3.34 cm    STJ 2.58 cm    AV mean gradient 3 mmHg    Ao peak ty 1.21 m/s    Ao VTI 24.23 cm    IVS 0.89 0.6 - 1.1 cm    LA size 4.33 cm    Left Atrium Major Axis 6.50 cm    Left Atrium Minor Axis 5.59 cm    LVIDd 5.43 3.5 - 6.0 cm    LVIDs 4.79 (A) 2.1 - 4.0 cm    LVOT diameter 1.98 cm    LVOT peak VTI 13.63 cm    Posterior Wall 0.76 0.6 - 1.1 cm    MV Peak A Ty 0.49 m/s    E wave deceleration  "time 131.42 msec    MV Peak E Ty 0.64 m/s    PV Peak D Ty 0.34 m/s    PV Peak S Ty 0.27 m/s    RA Major Axis 5.14 cm    RA Width 3.91 cm    RVDD 4.13 cm    Sinus 3.01 cm    TAPSE 1.31 cm    TR Max Ty 2.65 m/s    TDI LATERAL 0.06 m/s    TDI SEPTAL 0.05 m/s    LA WIDTH 4.60 cm    MV stenosis pressure 1/2 time 38.11 ms    LV Diastolic Volume 143.29 mL    LV Systolic Volume 107.10 mL    RV S' 5.92 cm/s    LVOT peak ty 0.77 m/s    LA volume (mod) 72.01 cm3    MV "A" wave duration 16.56 msec    LV LATERAL E/E' RATIO 10.67 m/s    LV SEPTAL E/E' RATIO 12.80 m/s    FS 12 %    LA volume 101.76 cm3    LV mass 162.67 g    Left Ventricle Relative Wall Thickness 0.28 cm    AV valve area 1.73 cm2    AV Velocity Ratio 0.64     AV index (prosthetic) 0.56     MV valve area p 1/2 method 5.77 cm2    E/A ratio 1.31     Mean e' 0.06 m/s    Pulm vein S/D ratio 0.79     LVOT area 3.1 cm2    LVOT stroke volume 41.95 cm3    AV peak gradient 6 mmHg    E/E' ratio 11.64 m/s    LV Systolic Volume Index 53.0 mL/m2    LV Diastolic Volume Index 70.94 mL/m2    LA Volume Index 50.4 mL/m2    LV Mass Index 81 g/m2    Triscuspid Valve Regurgitation Peak Gradient 28 mmHg    LA Volume Index (Mod) 35.6 mL/m2    BSA 2.09 m2    Right Atrial Pressure (from IVC) 15 mmHg    EF 15 %    TV rest pulmonary artery pressure 43 mmHg    Narrative    · The left ventricle is mildly enlarged with severely decreased systolic   function. The estimated ejection fraction is 15%.  · There is severe left ventricular global hypokinesis.  · Normal right ventricular size with low normal right ventricular systolic   function.  · Grade I left ventricular diastolic dysfunction.  · Biatrial enlargement.  · Mild-to-moderate mitral regurgitation.  · Severe tricuspid regurgitation.  · The estimated PA systolic pressure is 43 mmHg.  · Elevated central venous pressure (15 mmHg).        "

## 2023-05-02 NOTE — ASSESSMENT & PLAN NOTE
Asymptomatic. Higher than 8 months ago. No signs of acute bleed at this time. Haptoglobin elevated.    -CBC daily  -Transfuse if <7

## 2023-05-02 NOTE — SUBJECTIVE & OBJECTIVE
Interval History: Pt feeling much better this morning after night of diuresis. ECHO this morning showing acute drop in EF to 15% from 55%. No chest pain noted.     Review of Systems   Constitutional:  Negative for chills, fatigue and fever.   HENT:  Negative for congestion, dental problem, facial swelling, postnasal drip, rhinorrhea, sore throat and trouble swallowing.    Eyes:  Negative for photophobia and pain.   Respiratory:  Negative for cough, choking, chest tightness, shortness of breath and wheezing.    Cardiovascular:  Positive for leg swelling. Negative for chest pain.   Gastrointestinal:  Negative for abdominal distention, abdominal pain, diarrhea, nausea and vomiting.   Endocrine: Negative.    Genitourinary:  Negative for dysuria, flank pain, frequency and hematuria.   Musculoskeletal:  Negative for back pain.   Skin:  Negative for pallor and rash.   Allergic/Immunologic: Negative.    Neurological:  Negative for dizziness, syncope, weakness, numbness and headaches.   Psychiatric/Behavioral:  The patient is not nervous/anxious.    Objective:     Vital Signs (Most Recent):  Temp: 97.7 °F (36.5 °C) (05/02/23 1215)  Pulse: 78 (05/02/23 1215)  Resp: 20 (05/02/23 1215)  BP: (!) 147/58 (05/02/23 1215)  SpO2: 97 % (05/02/23 1215)   Vital Signs (24h Range):  Temp:  [97.4 °F (36.3 °C)-99.1 °F (37.3 °C)] 97.7 °F (36.5 °C)  Pulse:  [] 78  Resp:  [18-20] 20  SpO2:  [97 %-100 %] 97 %  BP: (138-175)/(58-97) 147/58     Weight: 95.3 kg (210 lb)  Body mass index is 34.95 kg/m².    Intake/Output Summary (Last 24 hours) at 5/2/2023 1235  Last data filed at 5/2/2023 0612  Gross per 24 hour   Intake 200 ml   Output 900 ml   Net -700 ml      Physical Exam  Vitals and nursing note reviewed.   Constitutional:       General: She is not in acute distress.     Appearance: Normal appearance. She is obese. She is not ill-appearing or diaphoretic.   HENT:      Head: Normocephalic and atraumatic.      Right Ear: External ear  normal.      Left Ear: External ear normal.      Nose: Nose normal.      Mouth/Throat:      Mouth: Mucous membranes are moist.      Pharynx: Oropharynx is clear.   Eyes:      General: No scleral icterus.     Extraocular Movements: Extraocular movements intact.      Conjunctiva/sclera: Conjunctivae normal.      Pupils: Pupils are equal, round, and reactive to light.   Cardiovascular:      Rate and Rhythm: Normal rate and regular rhythm.      Pulses: Normal pulses.      Heart sounds: Normal heart sounds. No murmur heard.  Pulmonary:      Effort: Pulmonary effort is normal. No respiratory distress.      Breath sounds: Normal breath sounds. No stridor. No wheezing or rhonchi.   Chest:      Chest wall: No tenderness.   Abdominal:      General: Abdomen is flat. There is no distension.      Palpations: Abdomen is soft.      Tenderness: There is no abdominal tenderness. There is no guarding or rebound.   Musculoskeletal:         General: Swelling present. No tenderness. Normal range of motion.      Cervical back: Normal range of motion and neck supple. No rigidity or tenderness.      Right lower leg: Edema present.      Left lower leg: Edema present.      Comments: Trigger finger R middle finger   Skin:     General: Skin is warm and dry.      Capillary Refill: Capillary refill takes less than 2 seconds.      Coloration: Skin is not jaundiced.      Findings: No erythema.   Neurological:      General: No focal deficit present.      Mental Status: She is alert and oriented to person, place, and time.      Cranial Nerves: No cranial nerve deficit.      Motor: No weakness.   Psychiatric:         Mood and Affect: Mood normal.         Behavior: Behavior normal.       Significant Labs: All pertinent labs within the past 24 hours have been reviewed.    Significant Imaging: I have reviewed all pertinent imaging results/findings within the past 24 hours.

## 2023-05-02 NOTE — CONSULTS
[facility] ([place] [address])  Cardiology  Consult Note    Patient Name: Suyapa Connelly  MRN: [id number]  Admission Date: 5/1/2023  Hospital Length of Stay: 0 days  Code Status: Full Code   Attending Provider: Flavia Delgado MD   Consulting Provider: Omar Miller MD  Primary Care Physician: Magen Christensen MD  Principal Problem:Acute on chronic combined systolic and diastolic heart failure    Patient information was obtained from patient.     Inpatient consult to Cardiology  Consult performed by: Omar Miller MD  Consult ordered by: Chandrakant Oakley MD        Subjective:     Chief Complaint:  SOB     HPI:   Ms. Suyapa Connelly is a 56 year old female with MI 2012 s/p CABG x1 2020 with Dr. Altamirano, CAD s/p PCI to mid circ with  RCA 2020, pAfib, CKD, DM, HTN, HLD, s/p bilateral AKA 2/2 PAD 2021. [place] [facility] [place] chest pain, SOB, and abdominal/lower extremity swelling x 1 month. Patient describes chest pain as sharp, non radiating, located on left chest wall. Pain is not associated with exertion, mostly occurs at rest and resolves in a few minutes. She does describe a 2 minute episode over 2 weeks ago where she felt as if she could not catch her breath. Denies chest pain or palpitations at that time.    She has not been able to take majority of home medication in past 3 months due to mother-in-law (with dementia) accidentally threw them out. Per patient, she was able to only recently refill Lasix 40mg daily, Plavix, and Sertraline. She currently lives 2 hours away, not able to ambulate without assistance, and all care is at [facility]. She is unable to describe prior MI's but she states that she does not recall them ever being painful.    ED workup revealed BNP 1K, Troponin peak 0.05, CXR with pulmonary edema. EKG with sinus tach and ST abnormalities. TTE obtained which showed newly depressed EF of 15% (previously 55% 2021), cardiology consulted for evaluation.       Past  Medical History:   Diagnosis Date    Anxiety     Chronic pain syndrome     CKD (chronic kidney disease), stage III     Depression     Diabetes mellitus     type 2    Diabetes mellitus, type 2     GERD (gastroesophageal reflux disease)     Hyperemesis 3/23/2021    Hyperlipemia     Hypertension     Hypokalemia 3/23/2021    Infection of below knee amputation stump 3/12/2022    Myocardial infarction 2010    minor-caused by stress per pt.    Osteomyelitis     Osteomyelitis of left foot 4/30/2021    PVD (peripheral vascular disease)     Ulcer of left foot     Vaginal delivery     x1       Past Surgical History:   Procedure Laterality Date    ABOVE-KNEE AMPUTATION Left 5/18/2021    Procedure: AMPUTATION, ABOVE KNEE;  Surgeon: Teddy Huber MD;  Location: [address];  Service: Vascular;  Laterality: Left;    ABOVE-KNEE AMPUTATION Right 3/18/2022    Procedure: AMPUTATION, ABOVE KNEE;  Surgeon: DAYNE Florez II, MD;  Location: [facility] OR [address] [place] [address];  Service: Vascular;  Laterality: Right;    Angiogram - Right Extremity Right 7/9/15    angiogram-left leg  10/6/15    ANGIOGRAPHY OF LOWER EXTREMITY Left 4/29/2021    Procedure: ANGIOGRAM, LOWER EXTREMITY;  Surgeon: Teddy Huber MD;  Location: [facility] OR [address] [place] [address];  Service: Vascular;  Laterality: Left;    BELOW KNEE AMPUTATION OF LOWER EXTREMITY Right 12/28/2021    Procedure: AMPUTATION, BELOW KNEE;  Surgeon: Kaitlyn Rojas MD;  Location: [facility];  Service: General;  Laterality: Right;    CATHETERIZATION OF BOTH LEFT AND RIGHT HEART N/A 12/18/2019    Procedure: CATHETERIZATION, HEART, BOTH LEFT AND RIGHT;  Surgeon: Que Fernando III, MD;  Location: [facility] CATH LAB;  Service: Cardiology;  Laterality: N/A;    CORONARY ANGIOGRAPHY N/A 12/18/2019    Procedure: ANGIOGRAM, CORONARY ARTERY;  Surgeon: Que Fernando III, MD;  Location: [facility] CATH LAB;  Service: Cardiology;  Laterality: N/A;    CORONARY ANGIOGRAPHY INCLUDING BYPASS  GRAFTS WITH CATHETERIZATION OF LEFT HEART N/A 7/28/2020    Procedure: ANGIOGRAM, CORONARY, INCLUDING BYPASS GRAFT, WITH LEFT HEART CATHETERIZATION, 9 am;  Surgeon: Rachel Easley MD;  Location: [facility] CATH LAB;  Service: Cardiology;  Laterality: N/A;    CORONARY ARTERY BYPASS GRAFT (CABG) N/A 1/14/2020    Procedure: CORONARY ARTERY BYPASS GRAFT (CABG) x 1     Off Pump;  Surgeon: Huang Altamirano MD;  Location: [facility] OR [address] [place] [address];  Service: Cardiovascular;  Laterality: N/A;    CREATION OF FEMORAL-TIBIAL ARTERY BYPASS Left 4/29/2021    Procedure: CREATION, BYPASS, ARTERIAL, FEMORAL TO ANTERIOR TIBIAL;  Surgeon: Teddy Huber MD;  [facility];  Service: Vascular;  Laterality: Left;    CREATION OF FEMOROPOPLITEAL ARTERIAL BYPASS USING GRAFT Left 8/18/2020    Procedure: CREATION, BYPASS, ARTERIAL, FEMORAL TO POPLITEAL, USING GRAFT, LEFT LOWER EXTREMITY;  Surgeon: Teddy Huber MD;  Location: [facility] OR;  Service: Vascular;  Laterality: Left;  REQUEST 7:15 A.M. START----COVID NEGATIVE ON 8/17  1ST CASE STARTKENYA DUEÑAS ON 8/7/2020 @ 942AM-LO  RN PREOP 8/12/2020   T/S-----CLEARED BY CARDS-------PENDING INSURANCE    DEBRIDEMENT OF FOOT Left 9/8/2020    Procedure: DEBRIDEMENT, FOOT;  Surgeon: Rosio Mayes DPM;  Location: [facility] OR;  Service: Podiatry;  Laterality: Left;  request neoxx .   RN Pre Op 9-4-2020, Covid negative on 9/5/20. C A    DEBRIDEMENT OF FOOT  3/4/2021    Procedure: DEBRIDEMENT, FOOT;  Surgeon: Teddy Huber MD;  Location: [facility] OR;  Service: Vascular;;    DEBRIDEMENT OF FOOT Left 3/9/2021    Procedure: DEBRIDEMENT, FOOT, bone biopsy;  Surgeon: Rosio Mayes DPM;  Location: [facility] OR;  Service: Podiatry;  Laterality: Left;  Request neoxx---COVID IN AM  REQUESTING NOON START  RN Phone Pre op.On Blood thinners Plavix and Eliquis.  Covid am of surgery. C A    DEBRIDEMENT OF FOOT Left 5/4/2021    Procedure: DEBRIDEMENT, FOOT;  Surgeon: Farooq Morley DPM;  Location:  [facility] OR 2ND FLR;  Service: Podiatry;  Laterality: Left;    INSERTION OF TUNNELED CENTRAL VENOUS HEMODIALYSIS CATHETER N/A 1/27/2020    Procedure: Insertion, Catheter, Central Venous, Hemodialysis;  Surgeon: ESTEBAN Gomez III, MD;  Location: [facility] CATH LAB;  Service: Peripheral Vascular;  Laterality: N/A;    PERCUTANEOUS TRANSLUMINAL ANGIOPLASTY N/A 3/4/2021    Procedure: PTA (ANGIOPLASTY, PERCUTANEOUS, TRANSLUMINAL);  Surgeon: Teddy Huber MD;  Location: [facility] OR;  Service: Vascular;  Laterality: N/A;    REMOVAL OF ARTERIOVENOUS GRAFT Left 5/27/2021    Procedure: REMOVAL, GRAFT, LEFT LOWER EXTREMITY, WOUND EXPLORATION;  Surgeon: Teddy Huber MD;  Location: [facility] OR 2ND FLR;  Service: Vascular;  Laterality: Left;    REMOVAL OF NAIL OF DIGIT Left 3/9/2021    Procedure: REMOVAL, NAIL, DIGIT;  Surgeon: Rosio Mayes DPM;  Location: [facility] OR;  Service: Podiatry;  Laterality: Left;    THROMBECTOMY Left 3/4/2021    Procedure: THROMBECTOMY, LEFT LOWER EXTREMITY BYPASS GRAFT, ANGIOGRAM, POSSIBLE INTERVENTION, POSSIBLE LEFT LOWER EXTREMITY BYPASS;  Surgeon: Teddy Huber MD;  Location: [facility] OR;  Service: Vascular;  Laterality: Left;    THROMBECTOMY Left 4/29/2021    Procedure: GRAFT THROMBECTOMY, LEFT LOWER EXTREMITY;  Surgeon: Teddy Huber MD;  Location: [facility] OR 2ND FLR;  Service: Vascular;  Laterality: Left;  14.5 min  1179.85 mGy  341.01 Gycm2  240 ml dye    THROMBECTOMY  10/22/2021    Procedure: THROMBECTOMY;  Surgeon: Saad Arenas MD;  Location: [facility] CATH LAB/EP;  Service: Cardiology;;       Review of patient's allergies indicates:   Allergen Reactions    Ciprofloxacin Itching    Contrast media      Kidney injury    Iodine      Kidney injury       No current facility-administered medications on file prior to encounter.     Current Outpatient Medications on File Prior to Encounter   Medication Sig    furosemide (LASIX) 40 MG tablet TAKE 1 TABLET(40 MG) BY MOUTH EVERY DAY     [DISCONTINUED] atorvastatin (LIPITOR) 80 MG tablet TAKE 1 TABLET(80 MG) BY MOUTH EVERY NIGHT    [DISCONTINUED] clopidogreL (PLAVIX) 75 mg tablet TAKE 1 TABLET(75 MG) BY MOUTH DAILY    [DISCONTINUED] NOVOLOG FLEXPEN U-100 INSULIN 100 unit/mL (3 mL) InPn pen INJECT 5 UNITS INTO SKIN THREE TIMES DAILY WITH MEALS PLUS SLIDING SCALE FOR MAX DAILY DOSE OF 25 UNITS, HOLD IF NOT EATING (Patient taking differently: 8 Units. INJECT 8 UNITS INTO SKIN THREE TIMES DAILY WITH MEALS PLUS SLIDING SCALE FOR MAX DAILY DOSE OF 25 UNITS, HOLD IF NOT EATING)    [DISCONTINUED] pantoprazole (PROTONIX) 40 MG tablet TAKE 1 TABLET(40 MG) BY MOUTH DAILY    [DISCONTINUED] sertraline (ZOLOFT) 50 MG tablet TAKE 1 TABLET(50 MG) BY MOUTH DAILY    [DISCONTINUED] allopurinoL (ZYLOPRIM) 100 MG tablet Take 1 tablet (100 mg total) by mouth once daily.    [DISCONTINUED] amitriptyline (ELAVIL) 10 MG tablet Take 1 tablet (10 mg total) by mouth nightly as needed for Pain.    [DISCONTINUED] aspirin (ECOTRIN) 81 MG EC tablet Take 81 mg by mouth once daily at 6am.    [DISCONTINUED] carvediloL (COREG) 3.125 MG tablet TAKE 1 TABLET(3.125 MG) BY MOUTH TWICE DAILY WITH BREAKFAST AND DINNER    [DISCONTINUED] docusate sodium (COLACE) 100 MG capsule Take 100 mg by mouth 2 (two) times daily.    [DISCONTINUED] insulin detemir U-100 (LEVEMIR FLEXTOUCH) 100 unit/mL (3 mL) SubQ InPn pen Inject 12 Units into the skin every evening.    [DISCONTINUED] insulin glargine (LANTUS) 100 unit/mL injection Inject 8 Units into the skin every evening.    [DISCONTINUED] lancing device Misc 1 Device by Misc.(Non-Drug; Combo Route) route 2 (two) times daily with meals.    [DISCONTINUED] losartan (COZAAR) 25 MG tablet Take 12.5 mg by mouth every evening.    [DISCONTINUED] melatonin (MELATIN) 3 mg tablet Take 2 tablets (6 mg total) by mouth nightly as needed for Insomnia.    [DISCONTINUED] multivit-min-FA-lycopen-lutein 0.4 mg-300 mcg- 250 mcg Tab Take 1 tablet by mouth  once daily.    [DISCONTINUED] multivit/folic acid/vit K1 (ONE-A-DAY WOMEN'S 50 PLUS ORAL) Take 1 tablet by mouth Daily.    [DISCONTINUED] NIFEdipine (PROCARDIA-XL) 30 MG (OSM) 24 hr tablet Take 1 tablet (30 mg total) by mouth once daily.    [DISCONTINUED] pregabalin (LYRICA) 75 MG capsule Take 1 capsule (75 mg total) by mouth 3 (three) times daily.    [DISCONTINUED] rivaroxaban (XARELTO) 10 mg Tab Take 2.5 mg by mouth nightly.    [DISCONTINUED] sodium bicarbonate 650 MG tablet Take 2 tablets (1,300 mg total) by mouth 3 (three) times daily.     Family History       Problem Relation (Age of Onset)    Diabetes Mother, Father, Paternal Grandmother    Heart disease Maternal Grandmother    No Known Problems Maternal Grandfather, Paternal Grandfather          Tobacco Use    Smoking status: Former    Smokeless tobacco: Never   Substance and Sexual Activity    Alcohol use: No    Drug use: Yes     Types: Marijuana     Comment: occassional    Sexual activity: Yes     Partners: Male     Review of Systems   Cardiovascular:  Positive for chest pain, dyspnea on exertion and orthopnea. Negative for palpitations and syncope.   Respiratory:  Positive for shortness of breath. Negative for cough, hemoptysis, sputum production and wheezing.    Objective:     Vital Signs (Most Recent):  Temp: 97.7 °F (36.5 °C) (05/02/23 1215)  Pulse: 78 (05/02/23 1215)  Resp: 20 (05/02/23 1215)  BP: (!) 147/58 (05/02/23 1215)  SpO2: 97 % (05/02/23 1215)   Vital Signs (24h Range):  Temp:  [97.4 °F (36.3 °C)-99.1 °F (37.3 °C)] 97.7 °F (36.5 °C)  Pulse:  [] 78  Resp:  [18-20] 20  SpO2:  [97 %-100 %] 97 %  BP: (138-175)/(58-97) 147/58     Weight: 95.3 kg (210 lb)  Body mass index is 34.95 kg/m².    SpO2: 97 %         Intake/Output Summary (Last 24 hours) at 5/2/2023 1422  Last data filed at 5/2/2023 0612  Gross per 24 hour   Intake 200 ml   Output 900 ml   Net -700 ml       Lines/Drains/Airways       Peripheral Intravenous Line  Duration                   External Jugular IV 08/13/22 1735 261 days         Peripheral IV - Single Lumen 05/01/23 1604 20 G Posterior;Right Hand <1 day                    Physical Exam  Vitals reviewed.   Constitutional:       General: She is not in acute distress.     Appearance: She is obese. She is not ill-appearing or toxic-appearing.   HENT:      Mouth/Throat:      Mouth: Mucous membranes are moist.      Pharynx: No oropharyngeal exudate.   Neck:      Comments: JVD elevated  Cardiovascular:      Rate and Rhythm: Normal rate and regular rhythm.   Pulmonary:      Effort: Pulmonary effort is normal.      Comments: Bilateral crackles noted on exam  Abdominal:      General: Bowel sounds are normal. There is distension.      Palpations: Abdomen is soft.      Tenderness: There is no abdominal tenderness. There is no guarding.   Musculoskeletal:      Right lower leg: Edema present.      Left lower leg: Edema present.      Comments: Patient with bilateral AKA. Edema of thighs   Skin:     General: Skin is warm.      Coloration: Skin is not jaundiced.      Findings: No bruising.   Neurological:      General: No focal deficit present.      Mental Status: She is alert and oriented to person, place, and time. Mental status is at baseline.   Psychiatric:         Mood and Affect: Mood is depressed.       Significant Labs: All pertinent lab results from the last 24 hours have been reviewed.    Significant Imaging: Echocardiogram: Transthoracic echo (TTE) complete (Cupid Only):   Results for orders placed or performed during the hospital encounter of 05/01/23   Echo   Result Value Ref Range    Ascending aorta 3.34 cm    STJ 2.58 cm    AV mean gradient 3 mmHg    Ao peak niya 1.21 m/s    Ao VTI 24.23 cm    IVS 0.89 0.6 - 1.1 cm    LA size 4.33 cm    Left Atrium Major Axis 6.50 cm    Left Atrium Minor Axis 5.59 cm    LVIDd 5.43 3.5 - 6.0 cm    LVIDs 4.79 (A) 2.1 - 4.0 cm    LVOT diameter 1.98 cm    LVOT peak VTI 13.63 cm    Posterior Wall 0.76  "0.6 - 1.1 cm    MV Peak A Ty 0.49 m/s    E wave deceleration time 131.42 msec    MV Peak E Ty 0.64 m/s    PV Peak D Ty 0.34 m/s    PV Peak S Ty 0.27 m/s    RA Major Axis 5.14 cm    RA Width 3.91 cm    RVDD 4.13 cm    Sinus 3.01 cm    TAPSE 1.31 cm    TR Max Ty 2.65 m/s    TDI LATERAL 0.06 m/s    TDI SEPTAL 0.05 m/s    LA WIDTH 4.60 cm    MV stenosis pressure 1/2 time 38.11 ms    LV Diastolic Volume 143.29 mL    LV Systolic Volume 107.10 mL    RV S' 5.92 cm/s    LVOT peak ty 0.77 m/s    LA volume (mod) 72.01 cm3    MV "A" wave duration 16.56 msec    LV LATERAL E/E' RATIO 10.67 m/s    LV SEPTAL E/E' RATIO 12.80 m/s    FS 12 %    LA volume 101.76 cm3    LV mass 162.67 g    Left Ventricle Relative Wall Thickness 0.28 cm    AV valve area 1.73 cm2    AV Velocity Ratio 0.64     AV index (prosthetic) 0.56     MV valve area p 1/2 method 5.77 cm2    E/A ratio 1.31     Mean e' 0.06 m/s    Pulm vein S/D ratio 0.79     LVOT area 3.1 cm2    LVOT stroke volume 41.95 cm3    AV peak gradient 6 mmHg    E/E' ratio 11.64 m/s    LV Systolic Volume Index 53.0 mL/m2    LV Diastolic Volume Index 70.94 mL/m2    LA Volume Index 50.4 mL/m2    LV Mass Index 81 g/m2    Triscuspid Valve Regurgitation Peak Gradient 28 mmHg    LA Volume Index (Mod) 35.6 mL/m2    BSA 2.09 m2    Right Atrial Pressure (from IVC) 15 mmHg    EF 15 %    TV rest pulmonary artery pressure 43 mmHg    Narrative    · The left ventricle is mildly enlarged with severely decreased systolic   function. The estimated ejection fraction is 15%.  · There is severe left ventricular global hypokinesis.  · Normal right ventricular size with low normal right ventricular systolic   function.  · Grade I left ventricular diastolic dysfunction.  · Biatrial enlargement.  · Mild-to-moderate mitral regurgitation.  · Severe tricuspid regurgitation.  · The estimated PA systolic pressure is 43 mmHg.  · Elevated central venous pressure (15 mmHg).        Assessment and Plan:     * Acute on " chronic combined systolic and diastolic heart failure  Patient with significant cardiovascular history presenting for ADHF with newly depressed EF  Patient not on GDMT  Takes lasix 40mg daily    TTE 15% (previously 55%), biatrial enlargement, CVP 15, PASP 43, severe tricuspid regurgitation, not noted on prior in 2021    Recommendations:  - Increase diuresis with 80mg IV TID for daily goal net negative 2-3L  - BMP BID to assess for close electrolyte monitoring, goal K>4, Mg>2  - Inpatient vs outpatient stress testing once patient euvolemic  - Strict I/Os with daily weights  - 1.5L fluid restriction, low Na diet  - Will start GDMT when patient euvolemic and renal function improves  - Repeat Lipid panel, TSH, HgA1c      VTE Risk Mitigation (From admission, onward)    None          Thank you for your consult. I will follow-up with patient. Please contact us if you have any additional questions.    Omar Miller MD  Cardiology   [facility] ([place])

## 2023-05-02 NOTE — MEDICAL/APP STUDENT
[facility] Medicine Student   Progress Note  [facility] HOSP MED 2    Admit Date: 5/1/2023  Hospital Day: 0  05/02/2023  9:33 AM    SUBJECTIVE:   Ms. Suyapa Connelly is a 57 y.o. female with a relevant medical history of CAD s/p CABG, CKD, DM, HTN, HLD, s/p bilateral AKA who is being followed up for Acute on chronic combined systolic and diastolic heart failure.    HPI: Presented with chest pain, difficulty breathing, leg swelling. Brought in by  who claims patient has not had access to her meds for 6 months,  has been giving her his meds. Her current chest pain is left-sided and is nonradiating.  For the past 3 weeks, she has had increased swelling up to her chest and a dry cough. She has also had nausea and vomiting for past 3 weeks that occurs after eating, she eats 1 meal per day. She has R sided breast and abdominal pain, which is likely due to fluid accumulation. She normally sleeps with 2 pillows but has had to sit up multiple times throughout the night due to SOB.    Hospital Course: Given 1 dose of Lasix 80 mg IV and calcium carbonate 500 mg po in the ED. On hospital admission, switched to Lasix 40 mg IV Bid for diuresis.    Interval history: Episode of L sided chest pain this morning, same as previous chest pain. Experiencing nausea and decreased appetite. SOB is improving and leg swelling is improving. Echo done this morning.    Review of Systems   Constitutional:  Negative for chills and fever.   Eyes:  Negative for blurred vision and double vision.   Respiratory:  Positive for shortness of breath.    Cardiovascular:  Positive for chest pain, orthopnea, leg swelling and PND.   Gastrointestinal:  Positive for abdominal pain and nausea. Negative for constipation, diarrhea and vomiting.   Genitourinary: Negative.    Neurological:  Negative for dizziness and headaches.     Please refer to the H&P for past medical, family, and social history.    OBJECTIVE:     Vital Signs Recent:  Temp: 97.6 °F (36.4  °C) (23)  Pulse: 79 (23)  Resp: 18 (23)  BP: (!) 151/71 (23)  SpO2: 100 % (23)  Oxygen Documentation:                Device (Oxygen Therapy): room air         Vital Signs Range (Last 24H):  Temp:  [97.4 °F (36.3 °C)-99.1 °F (37.3 °C)]   Pulse:  []   Resp:  [18-20]   BP: (138-175)/(71-97)   SpO2:  [99 %-100 %]        I & O (Last 24H):  Intake/Output Summary (Last 24 hours) at 2023  Last data filed at 2023 0612  Gross per 24 hour   Intake 200 ml   Output 900 ml   Net -700 ml        Physical Exam:  Physical Exam  Constitutional:       Appearance: Normal appearance. She is obese.   HENT:      Head: Normocephalic and atraumatic.   Eyes:      Extraocular Movements: Extraocular movements intact.      Conjunctiva/sclera: Conjunctivae normal.      Pupils: Pupils are equal, round, and reactive to light.   Cardiovascular:      Rate and Rhythm: Normal rate and regular rhythm.      Pulses: Normal pulses.      Heart sounds: Murmur heard.   Pulmonary:      Effort: Pulmonary effort is normal.      Breath sounds: Normal breath sounds.   Abdominal:      General: Bowel sounds are normal.      Palpations: Abdomen is soft.      Tenderness: There is abdominal tenderness.   Musculoskeletal:         General: Swelling present.      Right hand: Deformity present.      Right lower le+ Pitting Edema present.      Left lower le+ Pitting Edema present.      Comments: R index and middle finger in flexed position, unable to extend fingers      Right Lower Extremity: Right leg is amputated above knee.      Left Lower Extremity: Left leg is amputated above knee.   Skin:     General: Skin is warm and dry.   Neurological:      General: No focal deficit present.      Mental Status: She is alert and oriented to person, place, and time. Mental status is at baseline.   Psychiatric:         Behavior: Behavior normal.         Thought Content: Thought content normal.          Judgment: Judgment normal.       Labs:   Recent Labs   Lab 05/01/23  1602 05/01/23 2136 05/02/23 0449    138  --    K 4.2 4.2  --     109  --    CO2 18* 19*  --    BUN 57* 58*  --    CREATININE 3.9* 4.0*  --    * 169*  --    CALCIUM 6.9* 7.3*  --    MG  --   --  1.8   PHOS  --   --  6.5*     Recent Labs   Lab 05/01/23  1602 05/01/23 2136   ALKPHOS 233* 253*   ALT 42 47*   AST 45* 42*   ALBUMIN 2.1* 2.2*   PROT 6.1 6.6   BILITOT 0.3 0.3     Recent Labs   Lab 05/01/23  1602 05/02/23 0449   WBC 5.18 5.47   HGB 9.9* 9.9*   HCT 32.4* 33.8*    297       Diagnostic Results:     Results for orders placed during the hospital encounter of 05/01/23    Echo    Interpretation Summary  · The left ventricle is mildly enlarged with severely decreased systolic function. The estimated ejection fraction is 15%.  · There is severe left ventricular global hypokinesis.  · Normal right ventricular size with low normal right ventricular systolic function.  · Grade I left ventricular diastolic dysfunction.  · Biatrial enlargement.  · Mild-to-moderate mitral regurgitation.  · Severe tricuspid regurgitation.  · The estimated PA systolic pressure is 43 mmHg.  · Elevated central venous pressure (15 mmHg).     Scheduled Meds:   allopurinoL  100 mg Oral Daily    aspirin  81 mg Oral Daily    atorvastatin  80 mg Oral Daily    carvediloL  3.125 mg Oral BID WM    clopidogreL  75 mg Oral Daily    docusate sodium  100 mg Oral BID    furosemide  40 mg Oral Daily    insulin aspart U-100  3 Units Subcutaneous TIDWM    insulin detemir U-100  8 Units Subcutaneous QHS    pantoprazole  40 mg Oral Daily    rivaroxaban  2.5 mg Oral Nightly    sertraline  50 mg Oral Daily    sodium bicarbonate  1,300 mg Oral TID     Continuous Infusions:  PRN Meds:dextrose 10%, dextrose 10%, dextrose, dextrose, glucagon (human recombinant), insulin aspart U-100, melatonin, naloxone, sodium chloride 0.9%    ASSESSMENT/PLAN:   Ms. Suyapa Connelly is  a 57 y.o. female with a relevant medical history of CAD s/p CABG, CKD, DM, HTN, HLD, s/p bilateral AKA who is being followed up for Acute on chronic combined systolic and diastolic heart failure.    Active Hospital Problems    Diagnosis  POA    *Acute on chronic combined systolic and diastolic heart failure [I50.43]  Yes    Acute hypoxemic respiratory failure [J96.01]  Yes    ROSLYN (acute kidney injury) [N17.9]  Unknown    CKD (chronic kidney disease), stage IV [N18.4]  Yes    Uses self-applied continuous glucose monitoring device [Z97.8]  Yes    Dermatitis associated with moisture [L30.8]  Yes    Above-knee amputation of right lower extremity [S78.111A]  Yes    Status post above knee amputation, left [Z89.612]  Not Applicable    Hypoalbuminemia [E88.09]  Yes    Impaired functional mobility and endurance [Z74.09]  Yes    Paroxysmal atrial fibrillation [I48.0]  Yes    S/P CABG x 1 [Z95.1]  Not Applicable    Anemia [D64.9]  Yes    CAD (coronary artery disease) [I25.10]  Yes     Formatting of this note might be different from the original.  Last Assessment & Plan:   Formatting of this note might be different from the original.  -- Optimize glucose control.        CAROLIN (generalized anxiety disorder) [F41.1]  Yes    Essential hypertension [I10]  Yes    PAD (peripheral artery disease) [I73.9]  Yes      Resolved Hospital Problems   No resolved problems to display.     Acute on chronic combined systolic and diastolic heart failure  Chest xr showed pulmonary edema. BNP 1063, troponin 0.05. Clinical signs and symptoms of fluid overload. Echo 05/02/23:    The left ventricle is mildly enlarged with severely decreased systolic function. The estimated ejection fraction is 15%.  · There is severe left ventricular global hypokinesis.  · Normal right ventricular size with low normal right ventricular systolic function.  · Grade I left ventricular diastolic dysfunction.  · Biatrial enlargement.  · Mild-to-moderate mitral  regurgitation.  · Severe tricuspid regurgitation.  · The estimated PA systolic pressure is 43 mmHg.  · Elevated central venous pressure (15 mmHg).     PLAN:  -Recent echo showed EF of 15%, down from 55%   - Consult cardiology for EF of 15%, possible aicd  -Lasix 40 mg IV bid, Carvedilol       ROSLYN  CKD (chronic kidney disease), stage IV  Cr 3.6, 2.0 baseline. Possibly due to renovascular congestion from volume overload  PLAN:  -Hold losartan  -Lasix 40 IV BID  -Monitor renal functions     Hypertension   -Restart home carvedilol, lasix     Paroxysmal atrial fibrillation  - Rivaroxaban tablet 2.5 mg  -Carvedilol     CAD   -clopidogrel 75  -aspirin 81     CAROLIN  -sertraline     Uses self-applied continuous glucose monitoring device  -POCT glucose and daily cmps  -insulin for hyperglycemia    Hypoalbuminemia  Pt with poor PO intake reported. Pt eats one meal a day and has associated nausea and vomiting. Will try antiemetics before feeds to improve intake. Will monitor daily intake and calorie replacement.      -Calorie count  -Diabetic/Renal diet  -Antiemetics before meals; Zofran 4 IV PRN  -Monitor Qtc for prolongation with antiemetics    VTE Risk Mitigation (From admission, onward)      None           Discharge planning:   DEVAN: 5/4/2023     Code Status: FULL  Is the patient medically ready for discharge? No    Reason for patient still in hospital (select all that apply):Treament and trending patient condition  Discharge Plan A: Home  Discharge Delays:

## 2023-05-02 NOTE — ASSESSMENT & PLAN NOTE
Patient with significant cardiovascular history presenting for ADHF with newly depressed EF  Patient not on GDMT  Takes lasix 40mg daily    TTE 15% (previously 55%), biatrial enlargement, CVP 15, PASP 43, severe tricuspid regurgitation, not noted on prior in 2021    Recommendations:  - Increase diuresis with 80mg IV TID for daily goal net negative 2-3L  - BMP BID to assess for close electrolyte monitoring, goal K>4, Mg>2  - Inpatient vs outpatient stress testing once patient euvolemic  - Strict I/Os with daily weights  - 1.5L fluid restriction, low Na diet  - Will start GDMT when patient euvolemic and renal function improves  - Repeat Lipid panel, TSH, HgA1c

## 2023-05-02 NOTE — HPI
Ms. Suyapa oCnnelly is a 56 year old female with MI 2012 s/p CABG x1 2020 with Dr. Altamirano, CAD s/p PCI to mid circ with  RCA 2020, pAfib, CKD, DM, HTN, HLD, s/p bilateral AKA 2/2 PAD 2021. She presents to [facility] for intermittent chest pain, SOB, and abdominal/lower extremity swelling x 1 month. Patient describes chest pain as sharp, non radiating, located on left chest wall. Pain is not associated with exertion, mostly occurs at rest and resolves in a few minutes. She does describe a 2 minute episode over 2 weeks ago where she felt as if she could not catch her breath. Denies chest pain or palpitations at that time.    She has not been able to take majority of home medication in past 3 months due to mother-in-law (with dementia) accidentally threw them out. Per patient, she was able to only recently refill Lasix 40mg daily, Plavix, and Sertraline. She currently lives 2 hours away, not able to ambulate without assistance, and all care is at [facility]. She is unable to describe prior MI's but she states that she does not recall them ever being painful.    ED workup revealed BNP 1K, Troponin peak 0.05, CXR with pulmonary edema. EKG with sinus tach and ST abnormalities. TTE obtained which showed newly depressed EF of 15% (previously 55% 2021), cardiology consulted for evaluation.

## 2023-05-02 NOTE — PLAN OF CARE
05/02/23 0140   Post-Acute Status   Post-Acute Authorization Home Health;HME   HME Status Pending medical clearance/testing   Home Health Status Pending medical clearance/testing   Coverage Medicare/ Medicaid   Discharge Delays None known at this time   Discharge Plan   Discharge Plan A Home;Home with family;Home Health   Discharge Plan B Home;Home with family;Home Health     Patient can return home with family and resume home health. Patient needs PT added to home health orders for hand if applicable and an order for a .     Patient needs more DME equipment: Lift, Shower chair, and may later need a power chair ( order can help her with this).     FILOMENA Aly, MSW-LMSW  Medical Social Worker/  ER Department

## 2023-05-02 NOTE — PROGRESS NOTES
[facility] ([address] Medicine  Progress Note    Patient Name: Suyapa Connelly  MRN: [id number]  Patient Class: OP- Observation   Admission Date: 5/1/2023  Length of Stay: 0 days  Attending Physician: Flavia Delgado MD  Primary Care Provider: Magen Christensen MD        Subjective:     Principal Problem:Acute on chronic combined systolic and diastolic heart failure        HPI:  57 y.o. female with past medical history of CAD s/p CABG, CKD, DM, HTN, HLD, s/p bilateral AKA who presnets with chest pain, difficulty breathing, leg swelling.     Per ED She has been unable to access any of her medications for the last 3 months. Over the last few months she has noted worsening shortness of breath, particulatly orthopnea, and leg swelling. In the last two weeks she endorses associated abdominal pain and swelling, reduced appetite, and pleuritic chest pain with deep inspiration. She endorses occasional lighthededness with changes in position. Last week she had an episode where she was unable to breathe for about two minutes which was very upsetting for her and her , but she recovered and did not seek care at that time. Today, she complains of significant difficulty breathing, swelling in her legs and abdomen, and reduced appetite.     No headaches, visual changes, URI symptoms, palpitations, nausea, vomiting, diarrhea, blood in her stool, dysuria, hematuria, numbness or weakness in her extremities. She does endorse occasional paresthesias associated with her chronic phantom limb pain. She additionally has had increased difficulties with depression recently, denies suicidal or homicidal ideation, but has had considerable stress and low mood.      Of note, the patient saw a new primary care provider 4/21 for similar concerns who recommended she proceed to the ED at that time, but they were unable due to financial and geographic concerns. She lives two hours away from [facility] and her   cares for her and elderly mother with dementia. She has had decreased access to medical care over the last year, and unable to access most of her prescriptions. She had a 90 day supply of four medications (lasix, statin, clopidogrel, sertraline) but these were accidentally lost and she has been unable to afford a replacement. They present today for evaluation and to reestablish care and access to her prescriptions.     Labs on admission significant for an elevated BNP, hypocalcemia which corrected is 8.3, significant elevation in Creatinine and a slight elevation of troponin    CXR  Cardiomegaly with pulmonary interstitial edema, suggestive of pulmonary edema secondary to CHF.      Overview/Hospital Course:  No notes on file    Interval History: Pt feeling much better this morning after night of diuresis. ECHO this morning showing acute drop in EF to 15% from 55%. No chest pain noted.     Review of Systems   Constitutional:  Negative for chills, fatigue and fever.   HENT:  Negative for congestion, dental problem, facial swelling, postnasal drip, rhinorrhea, sore throat and trouble swallowing.    Eyes:  Negative for photophobia and pain.   Respiratory:  Negative for cough, choking, chest tightness, shortness of breath and wheezing.    Cardiovascular:  Positive for leg swelling. Negative for chest pain.   Gastrointestinal:  Negative for abdominal distention, abdominal pain, diarrhea, nausea and vomiting.   Endocrine: Negative.    Genitourinary:  Negative for dysuria, flank pain, frequency and hematuria.   Musculoskeletal:  Negative for back pain.   Skin:  Negative for pallor and rash.   Allergic/Immunologic: Negative.    Neurological:  Negative for dizziness, syncope, weakness, numbness and headaches.   Psychiatric/Behavioral:  The patient is not nervous/anxious.    Objective:     Vital Signs (Most Recent):  Temp: 97.7 °F (36.5 °C) (05/02/23 1215)  Pulse: 78 (05/02/23 1215)  Resp: 20 (05/02/23 1215)  BP: (!) 147/58  (05/02/23 1215)  SpO2: 97 % (05/02/23 1215)   Vital Signs (24h Range):  Temp:  [97.4 °F (36.3 °C)-99.1 °F (37.3 °C)] 97.7 °F (36.5 °C)  Pulse:  [] 78  Resp:  [18-20] 20  SpO2:  [97 %-100 %] 97 %  BP: (138-175)/(58-97) 147/58     Weight: 95.3 kg (210 lb)  Body mass index is 34.95 kg/m².    Intake/Output Summary (Last 24 hours) at 5/2/2023 1235  Last data filed at 5/2/2023 0612  Gross per 24 hour   Intake 200 ml   Output 900 ml   Net -700 ml      Physical Exam  Vitals and nursing note reviewed.   Constitutional:       General: She is not in acute distress.     Appearance: Normal appearance. She is obese. She is not ill-appearing or diaphoretic.   HENT:      Head: Normocephalic and atraumatic.      Right Ear: External ear normal.      Left Ear: External ear normal.      Nose: Nose normal.      Mouth/Throat:      Mouth: Mucous membranes are moist.      Pharynx: Oropharynx is clear.   Eyes:      General: No scleral icterus.     Extraocular Movements: Extraocular movements intact.      Conjunctiva/sclera: Conjunctivae normal.      Pupils: Pupils are equal, round, and reactive to light.   Cardiovascular:      Rate and Rhythm: Normal rate and regular rhythm.      Pulses: Normal pulses.      Heart sounds: Normal heart sounds. No murmur heard.  Pulmonary:      Effort: Pulmonary effort is normal. No respiratory distress.      Breath sounds: Normal breath sounds. No stridor. No wheezing or rhonchi.   Chest:      Chest wall: No tenderness.   Abdominal:      General: Abdomen is flat. There is no distension.      Palpations: Abdomen is soft.      Tenderness: There is no abdominal tenderness. There is no guarding or rebound.   Musculoskeletal:         General: Swelling present. No tenderness. Normal range of motion.      Cervical back: Normal range of motion and neck supple. No rigidity or tenderness.      Right lower leg: Edema present.      Left lower leg: Edema present.      Comments: Trigger finger R middle finger    Skin:     General: Skin is warm and dry.      Capillary Refill: Capillary refill takes less than 2 seconds.      Coloration: Skin is not jaundiced.      Findings: No erythema.   Neurological:      General: No focal deficit present.      Mental Status: She is alert and oriented to person, place, and time.      Cranial Nerves: No cranial nerve deficit.      Motor: No weakness.   Psychiatric:         Mood and Affect: Mood normal.         Behavior: Behavior normal.       Significant Labs: All pertinent labs within the past 24 hours have been reviewed.    Significant Imaging: I have reviewed all pertinent imaging results/findings within the past 24 hours.      Assessment/Plan:      * Acute on chronic combined systolic and diastolic heart failure  Last Echo 6/21 showing    The estimated ejection fraction is 55%.  The left ventricle is normal in size with normal systolic function.  Normal left ventricular diastolic function.  Normal right ventricular size with normal right ventricular systolic function.  Mild tricuspid regurgitation.  The estimated PA systolic pressure is 31 mmHg.  Normal central venous pressure (3 mmHg).    Echo    Result Date: 5/2/2023  · The left ventricle is mildly enlarged with severely decreased systolic   function. The estimated ejection fraction is 15%.  · There is severe left ventricular global hypokinesis.  · Normal right ventricular size with low normal right ventricular systolic   function.  · Grade I left ventricular diastolic dysfunction.  · Biatrial enlargement.  · Mild-to-moderate mitral regurgitation.  · Severe tricuspid regurgitation.  · The estimated PA systolic pressure is 43 mmHg.  · Elevated central venous pressure (15 mmHg).           -IV Diuresis  -Qq4 BMPs with electrolyte repletion as needed  -Cardiology consult for acute decompensated heart failure with reduced EF      ROSLYN (acute kidney injury)  Pt with increased Cr to 3.9 from 2.0 baseline. Possibly due to renovascular  congestion from volume overload.     -Kerns  -Strict Is/Os  -Avoid nephrotoxic agents  -Lasix 40 IV BID  -Daily CMPs        Acute hypoxemic respiratory failure  See Acute on chronic CHF    CKD (chronic kidney disease), stage IV  History of CKD. See ROSLYN.      Above-knee amputation of right lower extremity  Prior history consistent with exam      Uses self-applied continuous glucose monitoring device  POCT glucose and daily CMPs    -Restart insulin for hyperglycemia      Dermatitis associated with moisture  Large body habitus with dermatitis to folds. Will monitor for infection.    -Zinc based powder  -Topical abx if indicated      Status post above knee amputation, left  History of.      Hypoalbuminemia  Pt with poor PO intake reported. Pt eats one meal a day and has associated nausea and vomiting. Will try antiemetics before feeds to improve intake. Will monitor daily intake and calorie replacement.     -Calorie count  -Diabetic/Renal diet  -Antiemetics before meals; Zofran 4 IV PRN  -Monitor Qtc for prolongation with antiemetics      Impaired functional mobility and endurance  PT/OT for bedbound patient      Paroxysmal atrial fibrillation  Continue home rate control  NSR on EKG        Anemia  Asymptomatic. Higher than 8 months ago. No signs of acute bleed at this time. Haptoglobin elevated.    -CBC daily  -Transfuse if <7        S/P CABG x 1  History of. Sternal scar well healed.       CAD (coronary artery disease)  History of CAD with retrosternal chest pain. EKG negative to ST changes. Nonspecific T wave inversions.     -Cardiac tele  -EKG prn  -Trend troponins to peak        CAROLIN (generalized anxiety disorder)  Continue home anxiety medications      Essential hypertension  Continue home antihypertensives      PAD (peripheral artery disease)  History of bilateral AKA.           VTE Risk Mitigation (From admission, onward)      None            Discharge Planning   DEVAN: 5/4/2023     Code Status: Full Code   Is the  patient medically ready for discharge?: No    Reason for patient still in hospital (select all that apply): Patient trending condition, Treatment and Consult recommendations  Discharge Plan A: Home, Home with family, Home Health   Discharge Delays: None known at this time    Onur Bueno MD  Department of Hospital Medicine   [facility] - Intensive Care ([place] [id number])                    05/02/2023                             STAFF PHYSICIAN NOTE                                   Attending Attestation for Rounds with Resident  I have reviewed and concur with the resident's history, physical, assessment, and plan.  I have personally interviewed and examined the patient at bedside and agree with the resident's findings.               57 y.o. female with past medical history of CAD s/p CABG, CKD, DM, HTN, HLD, s/p bilateral AKA who presnets with chest pain, difficulty breathing, leg swelling. Diuresing. CHF exacerbation, ECHO- EF 15%. Cards consulted. Right hand- has trigger finger and contractures. Suspect diabetic neuropathy.  will need medication instructions and supervision, and f/u for adherence.                          Flavia Delgado MD  Senior Hospitalist  Department of Hospital Medicine  [facility]  [id number], [phone]

## 2023-05-02 NOTE — TELEPHONE ENCOUNTER
Spoke with patient  about what is going on. Mr. Mejias informed that his wife is currently being taken care of by the hospital diabetic team of nurses. Patient swelling that is going on needs to be taken care of by the hospital doctors. Patient  stated that he understand and thanks for the return phone call.

## 2023-05-02 NOTE — PLAN OF CARE
SW met with patient and family at bedside. Patient was alert and cooperative. Patient's address on face sheet is incorrect; however patient nor  could give me the correct address because they have not fully moved in the new place as of yet. The  knew the street and city but could not remember the house number ([facility], [place], [place] Patient is a double amputee and lives with her  and her mother in law that has dementia. Patient has been non compliant with her medications due to her mother n law throwing her medications away. Patient uses only a wheelchair for mobility assistance but has help and assistance from her . Patient has been unable to bath her self nor  push herself in her wheelchair because of her dominant hand being injured. Patient was ordered home health but according to her  it has not began as of yet. Patient needs a lift and shower chair to help with baths and transferring. Patient also needs PT for hand injury. Patient needs home health to resume and a  added to help with more resources if available. Patient may need a power chair if she is unable to stay mobile physically with using her hands.     Damian Robins, GRAHAM, [facility]  Medical Social Worker/  ER Department       [facility] - Intensive Care ([place] [address])  Initial Discharge Assessment       Primary Care Provider: Magen Christensen MD    Admission Diagnosis: CHF (congestive heart failure) [I50.9]  Chest pain [R07.9]    Admission Date: 5/1/2023  Expected Discharge Date: 5/4/2023         Payor: [employer] MANAGED MEDICARE / Plan: [employer] MEDICARE HMO / Product Type: Capitation /     Extended Emergency Contact Information  Primary Emergency Contact: Randell Connelly  Address: [address] [place] [address] [facility]  Home Phone: [phone]  Relation: Spouse  Secondary Emergency Contact: Cornelia Connelly  Address: [address] [place] [address]  [address] [facility] [place]  Home Phone: [phone]  Relation: Daughter  Mother: Tea Sal   [place]  Mobile Phone: [phone]    Discharge Plan A: (P) Home with family, Home Health  Discharge Plan B: (P) Home with family, Home Health      [id number] [place] - [address] AT [facility] OF [facility] [address]  [address]  [place] [address]  Phone: [phone] Fax: [phone]    [facility] [place] [address]  [address]  [place] [address]  Phone: [phone] Fax: [phone]    [id number] - [place], [place] - [address] AT [facility] [address]  [address]  [facility] [address]  Phone: [phone] Fax: [phone]      Initial Assessment (most recent)       Adult Discharge Assessment - 05/02/23 0106          Discharge Assessment    Assessment Type Discharge Planning Assessment (P)      Confirmed/corrected address, phone number and insurance -- (P)    Patient and family moves to a new location but could not give me a full address      [place]    When was your last doctors appointment? -- (P)    in Feb    Does patient/caregiver understand observation status Yes (P)      Communicated DEVAN with patient/caregiver Yes (P)      Reason For Admission Pain in Chest, Fluid, Pain in Stomach, SOB (P)      People in Home spouse;other relative(s) (P)      Facility Arrived From: Home (P)      Do you expect to return to your current living situation? Yes (P)      Do you have help at home or someone to help you manage your care at home? Yes (P)      Who are your caregiver(s) and their phone number(s)?  (P)      Prior to hospitilization cognitive status: Alert/Oriented (P)      Current cognitive status: Alert/Oriented (P)      Walking or Climbing Stairs ambulation difficulty, assistance 1 person;stair climbing difficulty, assistance 1  person;transferring difficulty, assistance 1 person (P)      Mobility Management Wheelchair (P)      Dressing/Bathing bathing difficulty, assistance 1 person;dressing difficulty, assistance 1 person (P)      Do you have any problems with: Errands/Grocery;Needs other help (P)      Specify other help Everyday Living (P)      Home Accessibility wheelchair accessible (P)      Home Layout Able to live on 1st floor (P)      Equipment Currently Used at Home wheelchair (P)      Readmission within 30 days? No (P)      Patient currently being followed by outpatient case management? No (P)      Do you currently have service(s) that help you manage your care at home? Yes (P)      Name and Contact number of agency Patient has Home health That hasnt started as of yet (P)      Is the pt/caregiver preference to resume services with current agency Yes (P)      Do you take prescription medications? Yes (P)      Do you have prescription coverage? Yes (P)      Coverage [employer] Managed medicare and Medicaid (P)      Do you have any problems affording any of your prescribed medications? No (P)      Is the patient taking medications as prescribed? no (P)      If no, which medications is patient not taking? all Medication (P)      Who is going to help you get home at discharge?  and daughter (P)      How do you get to doctors appointments? family or friend will provide (P)      Are you on dialysis? No (P)      Do you take coumadin? No (P)      Discharge Plan A Home with family;Home Health (P)      Discharge Plan B Home with family;Home Health (P)      DME Needed Upon Discharge  bedside commode;lift device;power chair;shower chair (P)      Discharge Plan discussed with: Caregiver;Spouse/sig other;Patient (P)         Physical Activity    On average, how many days per week do you engage in moderate to strenuous exercise (like a brisk walk)? 0 days (P)      On average, how many minutes do you engage in exercise at this level? 0 min (P)          Financial Resource Strain    How hard is it for you to pay for the very basics like food, housing, medical care, and heating? Not hard at all (P)         Housing Stability    In the last 12 months, was there a time when you were not able to pay the mortgage or rent on time? No (P)      In the last 12 months, how many places have you lived? 2 (P)      In the last 12 months, was there a time when you did not have a steady place to sleep or slept in a shelter (including now)? No (P)         Transportation Needs    In the past 12 months, has lack of transportation kept you from medical appointments or from getting medications? No (P)      In the past 12 months, has lack of transportation kept you from meetings, work, or from getting things needed for daily living? No (P)         Food Insecurity    Within the past 12 months, you worried that your food would run out before you got the money to buy more. Never true (P)         Stress    Do you feel stress - tense, restless, nervous, or anxious, or unable to sleep at night because your mind is troubled all the time - these days? Rather much (P)         Social Connections    In a typical week, how many times do you talk on the phone with family, friends, or neighbors? Three times a week (P)      How often do you get together with friends or relatives? Three times a week (P)      How often do you attend Episcopal or Orthodoxy services? Never (P)      Do you belong to any clubs or organizations such as Episcopal groups, unions, fraternal or athletic groups, or school groups? No (P)      How often do you attend meetings of the clubs or organizations you belong to? Never (P)      Are you , , , , never , or living with a partner?  (P)         Alcohol Use    Q1: How often do you have a drink containing alcohol? Never (P)      Q2: How many drinks containing alcohol do you have on a typical day when you are drinking? Patient does not  drink (P)      Q3: How often do you have six or more drinks on one occasion? Never (P)         OTHER    Name(s) of People in Home  and mother in law (P)

## 2023-05-02 NOTE — ASSESSMENT & PLAN NOTE
Last Echo 6/21 showing     The estimated ejection fraction is 55%.   The left ventricle is normal in size with normal systolic function.   Normal left ventricular diastolic function.   Normal right ventricular size with normal right ventricular systolic function.   Mild tricuspid regurgitation.   The estimated PA systolic pressure is 31 mmHg.   Normal central venous pressure (3 mmHg).    Echo    Result Date: 5/2/2023  · The left ventricle is mildly enlarged with severely decreased systolic   function. The estimated ejection fraction is 15%.  · There is severe left ventricular global hypokinesis.  · Normal right ventricular size with low normal right ventricular systolic   function.  · Grade I left ventricular diastolic dysfunction.  · Biatrial enlargement.  · Mild-to-moderate mitral regurgitation.  · Severe tricuspid regurgitation.  · The estimated PA systolic pressure is 43 mmHg.  · Elevated central venous pressure (15 mmHg).           -IV Diuresis  -Qq4 BMPs with electrolyte repletion as needed  -Cardiology consult for acute decompensated heart failure with reduced EF

## 2023-05-02 NOTE — CARE UPDATE
Patient to be on ASA indefinitely per cardiology    Will reach out to Vascular with regards to Plavix recommendations    With regards to xarelto we will hold at this time given the patient is in sinus rhythm and we will consider transition to Eliquis given the patients renal clearance    Demetrius Oakley MD

## 2023-05-03 LAB
ALBUMIN SERPL BCP-MCNC: 2.2 G/DL (ref 3.5–5.2)
ALBUMIN SERPL BCP-MCNC: 2.2 G/DL (ref 3.5–5.2)
ALP SERPL-CCNC: 219 U/L (ref 55–135)
ALP SERPL-CCNC: 237 U/L (ref 55–135)
ALT SERPL W/O P-5'-P-CCNC: 30 U/L (ref 10–44)
ALT SERPL W/O P-5'-P-CCNC: 31 U/L (ref 10–44)
ANION GAP SERPL CALC-SCNC: 10 MMOL/L (ref 8–16)
ANION GAP SERPL CALC-SCNC: 12 MMOL/L (ref 8–16)
AST SERPL-CCNC: 22 U/L (ref 10–40)
AST SERPL-CCNC: 26 U/L (ref 10–40)
BASOPHILS # BLD AUTO: 0.06 K/UL (ref 0–0.2)
BASOPHILS NFR BLD: 1.1 % (ref 0–1.9)
BILIRUB SERPL-MCNC: 0.3 MG/DL (ref 0.1–1)
BILIRUB SERPL-MCNC: 0.4 MG/DL (ref 0.1–1)
BUN SERPL-MCNC: 57 MG/DL (ref 6–20)
BUN SERPL-MCNC: 58 MG/DL (ref 6–20)
CALCIUM SERPL-MCNC: 7.1 MG/DL (ref 8.7–10.5)
CALCIUM SERPL-MCNC: 7.2 MG/DL (ref 8.7–10.5)
CHLORIDE SERPL-SCNC: 108 MMOL/L (ref 95–110)
CHLORIDE SERPL-SCNC: 108 MMOL/L (ref 95–110)
CHOLEST SERPL-MCNC: 205 MG/DL (ref 120–199)
CHOLEST/HDLC SERPL: 4.7 {RATIO} (ref 2–5)
CO2 SERPL-SCNC: 19 MMOL/L (ref 23–29)
CO2 SERPL-SCNC: 19 MMOL/L (ref 23–29)
CREAT SERPL-MCNC: 3.7 MG/DL (ref 0.5–1.4)
CREAT SERPL-MCNC: 3.8 MG/DL (ref 0.5–1.4)
DIFFERENTIAL METHOD: ABNORMAL
EOSINOPHIL # BLD AUTO: 0.1 K/UL (ref 0–0.5)
EOSINOPHIL NFR BLD: 2.6 % (ref 0–8)
ERYTHROCYTE [DISTWIDTH] IN BLOOD BY AUTOMATED COUNT: 15.4 % (ref 11.5–14.5)
EST. GFR  (NO RACE VARIABLE): 13.3 ML/MIN/1.73 M^2
EST. GFR  (NO RACE VARIABLE): 13.8 ML/MIN/1.73 M^2
GLUCOSE SERPL-MCNC: 106 MG/DL (ref 70–110)
GLUCOSE SERPL-MCNC: 148 MG/DL (ref 70–110)
HCT VFR BLD AUTO: 35 % (ref 37–48.5)
HDLC SERPL-MCNC: 44 MG/DL (ref 40–75)
HDLC SERPL: 21.5 % (ref 20–50)
HGB BLD-MCNC: 10.5 G/DL (ref 12–16)
IMM GRANULOCYTES # BLD AUTO: 0.02 K/UL (ref 0–0.04)
IMM GRANULOCYTES NFR BLD AUTO: 0.4 % (ref 0–0.5)
LDLC SERPL CALC-MCNC: 129.2 MG/DL (ref 63–159)
LYMPHOCYTES # BLD AUTO: 1.2 K/UL (ref 1–4.8)
LYMPHOCYTES NFR BLD: 21.4 % (ref 18–48)
MAGNESIUM SERPL-MCNC: 1.9 MG/DL (ref 1.6–2.6)
MCH RBC QN AUTO: 30.7 PG (ref 27–31)
MCHC RBC AUTO-ENTMCNC: 30 G/DL (ref 32–36)
MCV RBC AUTO: 102 FL (ref 82–98)
MONOCYTES # BLD AUTO: 0.6 K/UL (ref 0.3–1)
MONOCYTES NFR BLD: 10.8 % (ref 4–15)
NEUTROPHILS # BLD AUTO: 3.4 K/UL (ref 1.8–7.7)
NEUTROPHILS NFR BLD: 63.7 % (ref 38–73)
NONHDLC SERPL-MCNC: 161 MG/DL
NRBC BLD-RTO: 0 /100 WBC
PHOSPHATE SERPL-MCNC: 5.9 MG/DL (ref 2.7–4.5)
PLATELET # BLD AUTO: 293 K/UL (ref 150–450)
PMV BLD AUTO: 10.6 FL (ref 9.2–12.9)
POCT GLUCOSE: 143 MG/DL (ref 70–110)
POCT GLUCOSE: 148 MG/DL (ref 70–110)
POCT GLUCOSE: 179 MG/DL (ref 70–110)
POCT GLUCOSE: 90 MG/DL (ref 70–110)
POTASSIUM SERPL-SCNC: 4.6 MMOL/L (ref 3.5–5.1)
POTASSIUM SERPL-SCNC: 4.7 MMOL/L (ref 3.5–5.1)
PROT SERPL-MCNC: 6.4 G/DL (ref 6–8.4)
PROT SERPL-MCNC: 6.4 G/DL (ref 6–8.4)
RBC # BLD AUTO: 3.42 M/UL (ref 4–5.4)
SODIUM SERPL-SCNC: 137 MMOL/L (ref 136–145)
SODIUM SERPL-SCNC: 139 MMOL/L (ref 136–145)
TRIGL SERPL-MCNC: 159 MG/DL (ref 30–150)
TSH SERPL DL<=0.005 MIU/L-ACNC: 1 UIU/ML (ref 0.4–4)
WBC # BLD AUTO: 5.37 K/UL (ref 3.9–12.7)

## 2023-05-03 PROCEDURE — 83735 ASSAY OF MAGNESIUM: CPT | Mod: HCNC

## 2023-05-03 PROCEDURE — 97112 NEUROMUSCULAR REEDUCATION: CPT | Mod: HCNC

## 2023-05-03 PROCEDURE — 96372 THER/PROPH/DIAG INJ SC/IM: CPT | Performed by: STUDENT IN AN ORGANIZED HEALTH CARE EDUCATION/TRAINING PROGRAM

## 2023-05-03 PROCEDURE — 99232 PR SUBSEQUENT HOSPITAL CARE,LEVL II: ICD-10-PCS | Mod: HCNC,,, | Performed by: INTERNAL MEDICINE

## 2023-05-03 PROCEDURE — 84443 ASSAY THYROID STIM HORMONE: CPT | Mod: HCNC

## 2023-05-03 PROCEDURE — 97161 PT EVAL LOW COMPLEX 20 MIN: CPT | Mod: HCNC

## 2023-05-03 PROCEDURE — 36415 COLL VENOUS BLD VENIPUNCTURE: CPT | Mod: HCNC | Performed by: STUDENT IN AN ORGANIZED HEALTH CARE EDUCATION/TRAINING PROGRAM

## 2023-05-03 PROCEDURE — 84100 ASSAY OF PHOSPHORUS: CPT | Mod: HCNC

## 2023-05-03 PROCEDURE — 97530 THERAPEUTIC ACTIVITIES: CPT | Mod: HCNC

## 2023-05-03 PROCEDURE — 36415 COLL VENOUS BLD VENIPUNCTURE: CPT | Mod: HCNC

## 2023-05-03 PROCEDURE — 25000003 PHARM REV CODE 250: Mod: HCNC

## 2023-05-03 PROCEDURE — 85025 COMPLETE CBC W/AUTO DIFF WBC: CPT | Mod: HCNC | Performed by: STUDENT IN AN ORGANIZED HEALTH CARE EDUCATION/TRAINING PROGRAM

## 2023-05-03 PROCEDURE — 21400001 HC TELEMETRY ROOM: Mod: HCNC

## 2023-05-03 PROCEDURE — 25000003 PHARM REV CODE 250: Mod: HCNC | Performed by: STUDENT IN AN ORGANIZED HEALTH CARE EDUCATION/TRAINING PROGRAM

## 2023-05-03 PROCEDURE — 99233 PR SUBSEQUENT HOSPITAL CARE,LEVL III: ICD-10-PCS | Mod: HCNC,,, | Performed by: HOSPITALIST

## 2023-05-03 PROCEDURE — 80061 LIPID PANEL: CPT | Mod: HCNC

## 2023-05-03 PROCEDURE — 99233 SBSQ HOSP IP/OBS HIGH 50: CPT | Mod: HCNC,,, | Performed by: HOSPITALIST

## 2023-05-03 PROCEDURE — 80053 COMPREHEN METABOLIC PANEL: CPT | Mod: 91 | Performed by: STUDENT IN AN ORGANIZED HEALTH CARE EDUCATION/TRAINING PROGRAM

## 2023-05-03 PROCEDURE — 99232 SBSQ HOSP IP/OBS MODERATE 35: CPT | Mod: HCNC,,, | Performed by: INTERNAL MEDICINE

## 2023-05-03 PROCEDURE — 63600175 PHARM REV CODE 636 W HCPCS: Mod: HCNC | Performed by: STUDENT IN AN ORGANIZED HEALTH CARE EDUCATION/TRAINING PROGRAM

## 2023-05-03 RX ORDER — FUROSEMIDE 10 MG/ML
80 INJECTION INTRAMUSCULAR; INTRAVENOUS EVERY 8 HOURS
Status: DISCONTINUED | OUTPATIENT
Start: 2023-05-03 | End: 2023-05-04

## 2023-05-03 RX ADMIN — CARVEDILOL 3.12 MG: 3.12 TABLET, FILM COATED ORAL at 05:05

## 2023-05-03 RX ADMIN — INSULIN ASPART 3 UNITS: 100 INJECTION, SOLUTION INTRAVENOUS; SUBCUTANEOUS at 12:05

## 2023-05-03 RX ADMIN — CLOPIDOGREL BISULFATE 75 MG: 75 TABLET ORAL at 08:05

## 2023-05-03 RX ADMIN — INSULIN ASPART 0 UNITS: 100 INJECTION, SOLUTION INTRAVENOUS; SUBCUTANEOUS at 08:05

## 2023-05-03 RX ADMIN — INSULIN ASPART 3 UNITS: 100 INJECTION, SOLUTION INTRAVENOUS; SUBCUTANEOUS at 05:05

## 2023-05-03 RX ADMIN — SODIUM BICARBONATE 1300 MG: 650 TABLET ORAL at 09:05

## 2023-05-03 RX ADMIN — SODIUM BICARBONATE 1300 MG: 650 TABLET ORAL at 08:05

## 2023-05-03 RX ADMIN — Medication 6 MG: at 09:05

## 2023-05-03 RX ADMIN — ATORVASTATIN CALCIUM 80 MG: 40 TABLET, FILM COATED ORAL at 08:05

## 2023-05-03 RX ADMIN — PANTOPRAZOLE SODIUM 40 MG: 40 TABLET, DELAYED RELEASE ORAL at 08:05

## 2023-05-03 RX ADMIN — DOCUSATE SODIUM 100 MG: 100 CAPSULE, LIQUID FILLED ORAL at 09:05

## 2023-05-03 RX ADMIN — CARVEDILOL 3.12 MG: 3.12 TABLET, FILM COATED ORAL at 08:05

## 2023-05-03 RX ADMIN — DOCUSATE SODIUM 100 MG: 100 CAPSULE, LIQUID FILLED ORAL at 08:05

## 2023-05-03 RX ADMIN — FUROSEMIDE 80 MG: 10 INJECTION, SOLUTION INTRAMUSCULAR; INTRAVENOUS at 09:05

## 2023-05-03 RX ADMIN — FUROSEMIDE 80 MG: 20 TABLET ORAL at 08:05

## 2023-05-03 RX ADMIN — SERTRALINE HYDROCHLORIDE 50 MG: 50 TABLET ORAL at 08:05

## 2023-05-03 RX ADMIN — ALLOPURINOL 100 MG: 100 TABLET ORAL at 08:05

## 2023-05-03 RX ADMIN — INSULIN ASPART 3 UNITS: 100 INJECTION, SOLUTION INTRAVENOUS; SUBCUTANEOUS at 08:05

## 2023-05-03 RX ADMIN — FUROSEMIDE 80 MG: 10 INJECTION, SOLUTION INTRAMUSCULAR; INTRAVENOUS at 02:05

## 2023-05-03 RX ADMIN — INSULIN DETEMIR 8 UNITS: 100 INJECTION, SOLUTION SUBCUTANEOUS at 09:05

## 2023-05-03 RX ADMIN — SODIUM BICARBONATE 1300 MG: 650 TABLET ORAL at 02:05

## 2023-05-03 RX ADMIN — ASPIRIN 81 MG: 81 TABLET, COATED ORAL at 08:05

## 2023-05-03 NOTE — MEDICAL/APP STUDENT
[facility] Medicine Student   Progress Note  [facility] HOSP MED 2    Admit Date: 5/1/2023  Hospital Day: 0  05/03/2023  8:44 AM    SUBJECTIVE:   Ms. Suyapa Connelly is a 57 y.o. female with a relevant medical history of CAD s/p CABG, CKD, DM, HTN, HLD, s/p bilateral AKA who is being followed up for Acute on chronic combined systolic and diastolic heart failure.     HPI: Presented with chest pain, difficulty breathing, leg swelling. Brought in by  who claims patient has not had access to her meds for 6 months,  has been giving her his meds. Her current chest pain is left-sided and is nonradiating.  For the past 3 weeks, she has had increased swelling up to her chest and a dry cough. She has also had nausea and vomiting for past 3 weeks that occurs after eating, she eats 1 meal per day. She has R sided breast and abdominal pain, which is likely due to fluid accumulation. She normally sleeps with 2 pillows but has had to sit up multiple times throughout the night due to SOB.     Hospital Course: Given 1 dose of Lasix 80 mg IV and calcium carbonate 500 mg po in the ED. On hospital admission, switched to Lasix 40 mg IV Bid for diuresis. Echo on 05/02/23 showed EF of 15%, down from 55% in 2021. Cardiology consulted and increased Lasix to 80 mg TID.      Interval history: Did not sleep well last night due to discomfort and rumination. Depressed mood. SOB worse today. Lasix dose increased to 80 mg TID.    Review of Systems   Constitutional:  Negative for chills and fever.   Eyes:  Negative for blurred vision and double vision.   Respiratory:  Positive for shortness of breath.    Cardiovascular:  Positive for chest pain, orthopnea, leg swelling and PND.   Gastrointestinal:  Positive for nausea. Negative for constipation, diarrhea and vomiting.   Neurological:  Negative for dizziness, weakness and headaches.   Psychiatric/Behavioral:  Positive for depression.      Please refer to the H&P for past medical, family, and  social history.    OBJECTIVE:     Vital Signs Recent:  Temp: 97.8 °F (36.6 °C) (23)  Pulse: 86 (23)  Resp: 18 (23)  BP: (!) 144/69 (23)  SpO2: 96 % (23)  Oxygen Documentation:                Device (Oxygen Therapy): room air         Vital Signs Range (Last 24H):  Temp:  [97.6 °F (36.4 °C)-98.4 °F (36.9 °C)]   Pulse:  [78-90]   Resp:  [18-20]   BP: (133-151)/(58-87)   SpO2:  [95 %-100 %]        I & O (Last 24H):  Intake/Output Summary (Last 24 hours) at 5/3/2023 0844  Last data filed at 5/3/2023 0113  Gross per 24 hour   Intake 225 ml   Output 751 ml   Net -526 ml        Physical Exam:  Physical Exam  Constitutional:       Appearance: She is obese.   HENT:      Head: Normocephalic and atraumatic.   Eyes:      Extraocular Movements: Extraocular movements intact.      Conjunctiva/sclera: Conjunctivae normal.      Pupils: Pupils are equal, round, and reactive to light.   Cardiovascular:      Rate and Rhythm: Normal rate and regular rhythm.      Pulses: Normal pulses.      Heart sounds: Murmur heard.   Abdominal:      General: Bowel sounds are normal. There is distension.      Palpations: Abdomen is soft.   Musculoskeletal:      Right lower le+ Pitting Edema present.      Left lower le+ Pitting Edema present.      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      Left Lower Extremity: Left leg is amputated above knee.   Skin:     General: Skin is warm and dry.   Neurological:      General: No focal deficit present.      Mental Status: She is alert and oriented to person, place, and time. Mental status is at baseline.   Psychiatric:         Attention and Perception: Attention normal.         Mood and Affect: Affect normal. Mood is depressed.         Behavior: Behavior normal.         Thought Content: Thought content normal.         Cognition and Memory: She exhibits impaired remote memory.         Judgment: Judgment normal.       Labs:   Recent  Labs   Lab 05/01/23 2136 05/02/23  0449 05/02/23  0830 05/02/23  1924 05/03/23  0248     --  139 138  --    K 4.2  --  4.3 4.5  --      --  110 109  --    CO2 19*  --  18* 19*  --    BUN 58*  --  61* 56*  --    CREATININE 4.0*  --  3.6* 3.8*  --    *  --  81 120*  --    CALCIUM 7.3*  --  7.4* 7.1*  --    MG  --  1.8  --   --  1.9   PHOS  --  6.5*  --   --  5.9*     Recent Labs   Lab 05/01/23 2136 05/02/23 0830 05/02/23 1924   ALKPHOS 253* 223* 218*   ALT 47* 42 36   AST 42* 35 27   ALBUMIN 2.2* 2.2* 2.0*   PROT 6.6 6.2 6.0   BILITOT 0.3 0.4 0.3     Recent Labs   Lab 05/01/23  1602 05/02/23 0449 05/03/23  0248   WBC 5.18 5.47 5.37   HGB 9.9* 9.9* 10.5*   HCT 32.4* 33.8* 35.0*    297 293         Diagnostic Results:      Scheduled Meds:   allopurinoL  100 mg Oral Daily    aspirin  81 mg Oral Daily    atorvastatin  80 mg Oral Daily    carvediloL  3.125 mg Oral BID WM    clopidogreL  75 mg Oral Daily    docusate sodium  100 mg Oral BID    furosemide  80 mg Oral TID WAKE    insulin aspart U-100  3 Units Subcutaneous TIDWM    insulin detemir U-100  8 Units Subcutaneous QHS    pantoprazole  40 mg Oral Daily    sertraline  50 mg Oral Daily    sodium bicarbonate  1,300 mg Oral TID     Continuous Infusions:  PRN Meds:dextrose 10%, dextrose 10%, dextrose, dextrose, glucagon (human recombinant), insulin aspart U-100, melatonin, naloxone, sodium chloride 0.9%    ASSESSMENT/PLAN:   Ms. Suyapa Connelly is a 57 y.o. female with a relevant medical history of CAD s/p CABG, CKD, DM, HTN, HLD, s/p bilateral AKA who is being followed up for Acute on chronic combined systolic and diastolic heart failure.    Active Hospital Problems    Diagnosis  POA    *Acute on chronic combined systolic and diastolic heart failure [I50.43]  Yes    Acute hypoxemic respiratory failure [J96.01]  Yes    ROSLYN (acute kidney injury) [N17.9]  Unknown    CKD (chronic kidney disease), stage IV [N18.4]  Yes    Uses self-applied  continuous glucose monitoring device [Z97.8]  Yes    Dermatitis associated with moisture [L30.8]  Yes    Above-knee amputation of right lower extremity [S78.111A]  Yes    Status post above knee amputation, left [Z89.612]  Not Applicable    Hypoalbuminemia [E88.09]  Yes    Impaired functional mobility and endurance [Z74.09]  Yes    Paroxysmal atrial fibrillation [I48.0]  Yes    S/P CABG x 1 [Z95.1]  Not Applicable    Anemia [D64.9]  Yes    CAD (coronary artery disease) [I25.10]  Yes     Formatting of this note might be different from the original.  Last Assessment & Plan:   Formatting of this note might be different from the original.  -- Optimize glucose control.        CAROLIN (generalized anxiety disorder) [F41.1]  Yes    Essential hypertension [I10]  Yes    PAD (peripheral artery disease) [I73.9]  Yes      Resolved Hospital Problems   No resolved problems to display.       Acute on chronic combined systolic and diastolic heart failure  Chest xr showed pulmonary edema. BNP 1063, troponin 0.05 on admission. Clinical signs and symptoms of fluid overload. Echo 05/02/23:     The left ventricle is mildly enlarged with severely decreased systolic function. The estimated ejection fraction is 15%.  · There is severe left ventricular global hypokinesis.  · Normal right ventricular size with low normal right ventricular systolic function.  · Grade I left ventricular diastolic dysfunction.  · Biatrial enlargement.  · Mild-to-moderate mitral regurgitation.  · Severe tricuspid regurgitation.  · The estimated PA systolic pressure is 43 mmHg.  · Elevated central venous pressure (15 mmHg).      PLAN:  - Increased diuretic to Lasix 80 mg TID with goal of net negative 2-3L per day  - BMP BID with goal K>4, Mg>2  -1.5L fluid restriction, Low Na diet  -GDMT will be initiated once euvolemic  -outpt stress test        ROSLYN  CKD (chronic kidney disease), stage IV  Cr 3.6, 2.0 baseline. Possibly due to renovascular congestion from volume  overload  PLAN:  -Hold losartan  -Lasix   -Monitor renal functions     Hypertension   -home carvedilol     Paroxysmal atrial fibrillation  - D/C Rivaroxaban tablet 2.5 mg per cardiology  -Carvedilol     CAD   -aspirin 81  -statin     CAROLIN  -sertraline     Uses self-applied continuous glucose monitoring device  -POCT glucose and daily cmps  -insulin for hyperglycemia     Hypoalbuminemia  Pt with poor PO intake reported. Pt eats one meal a day and has associated nausea and vomiting. Will try antiemetics before feeds to improve intake. Will monitor daily intake and calorie replacement.      -Calorie count  -Diabetic/Renal diet  -Antiemetics before meals; Zofran 4 IV PRN  -Monitor Qtc for prolongation with antiemetics    Discharge planning:   DEVAN: 5/4/2023     Code Status: FULL  Is the patient medically ready for discharge? No    Reason for patient still in hospital (select all that apply):Treament and trending patient condition  Discharge Plan A: Home  Discharge Delays:

## 2023-05-03 NOTE — PLAN OF CARE
SW visited with pt to discuss loss of home medications.      Pt will require refill of Lasiks for home prior to d/c         Hazel Hart CD, MSW, LMSW, RSW   Case Management  [facility]  Email: [email or web address]

## 2023-05-03 NOTE — SUBJECTIVE & OBJECTIVE
Interval History: Pt feeling much better today. Breathing 100% on room air. Still edematous. Complaining of her trigger finger. PT has seen patient and she is working on stretching it out.     Review of Systems   Constitutional:  Negative for chills, fatigue and fever.   HENT:  Negative for congestion, dental problem, facial swelling, postnasal drip, rhinorrhea, sore throat and trouble swallowing.    Eyes:  Negative for photophobia and pain.   Respiratory:  Negative for cough, choking, chest tightness, shortness of breath and wheezing.    Cardiovascular:  Positive for leg swelling. Negative for chest pain.   Gastrointestinal:  Negative for abdominal distention, abdominal pain, diarrhea, nausea and vomiting.   Endocrine: Negative.    Genitourinary:  Negative for dysuria, flank pain, frequency and hematuria.   Musculoskeletal:  Negative for back pain.   Skin:  Negative for pallor and rash.   Allergic/Immunologic: Negative.    Neurological:  Negative for dizziness, syncope, weakness, numbness and headaches.   Psychiatric/Behavioral:  The patient is not nervous/anxious.    Objective:     Vital Signs (Most Recent):  Temp: 98 °F (36.7 °C) (05/03/23 1126)  Pulse: 93 (05/03/23 1126)  Resp: 19 (05/03/23 1126)  BP: 134/85 (05/03/23 1126)  SpO2: 100 % (05/03/23 1126) Vital Signs (24h Range):  Temp:  [97.7 °F (36.5 °C)-98.4 °F (36.9 °C)] 98 °F (36.7 °C)  Pulse:  [84-93] 93  Resp:  [17-19] 19  SpO2:  [95 %-100 %] 100 %  BP: (133-149)/(69-87) 134/85     Weight: 95.3 kg (210 lb)  Body mass index is 34.95 kg/m².    Intake/Output Summary (Last 24 hours) at 5/3/2023 1416  Last data filed at 5/3/2023 0113  Gross per 24 hour   Intake 225 ml   Output 751 ml   Net -526 ml      Physical Exam  Vitals and nursing note reviewed.   Constitutional:       General: She is not in acute distress.     Appearance: Normal appearance. She is obese. She is not ill-appearing or diaphoretic.   HENT:      Head: Normocephalic and atraumatic.      Right  Ear: External ear normal.      Left Ear: External ear normal.      Nose: Nose normal.      Mouth/Throat:      Mouth: Mucous membranes are moist.      Pharynx: Oropharynx is clear.   Eyes:      General: No scleral icterus.     Extraocular Movements: Extraocular movements intact.      Conjunctiva/sclera: Conjunctivae normal.      Pupils: Pupils are equal, round, and reactive to light.   Cardiovascular:      Rate and Rhythm: Normal rate and regular rhythm.      Pulses: Normal pulses.      Heart sounds: Normal heart sounds. No murmur heard.  Pulmonary:      Effort: Pulmonary effort is normal. No respiratory distress.      Breath sounds: Normal breath sounds. No stridor. No wheezing or rhonchi.   Chest:      Chest wall: No tenderness.   Abdominal:      General: Abdomen is flat. There is no distension.      Palpations: Abdomen is soft.      Tenderness: There is no abdominal tenderness. There is no guarding or rebound.   Musculoskeletal:         General: Swelling present. No tenderness. Normal range of motion.      Cervical back: Normal range of motion and neck supple. No rigidity or tenderness.      Right lower leg: Edema present.      Left lower leg: Edema present.      Comments: Trigger finger R middle finger   Skin:     General: Skin is warm and dry.      Capillary Refill: Capillary refill takes less than 2 seconds.      Coloration: Skin is not jaundiced.      Findings: No erythema.   Neurological:      General: No focal deficit present.      Mental Status: She is alert and oriented to person, place, and time.      Cranial Nerves: No cranial nerve deficit.      Motor: No weakness.   Psychiatric:         Mood and Affect: Mood normal.         Behavior: Behavior normal.       Significant Labs: All pertinent labs within the past 24 hours have been reviewed.    Significant Imaging: I have reviewed all pertinent imaging results/findings within the past 24 hours.

## 2023-05-03 NOTE — ASSESSMENT & PLAN NOTE
Patient with significant cardiovascular history presenting for ADHF with newly depressed EF  Patient not on GDMT  Takes lasix 40mg daily    TTE 15% (previously 55%), biatrial enlargement, CVP 15, PASP 43, severe tricuspid regurgitation, not noted on prior in 2021    Recommendations:  - Transition from PO to IV Lasix 80mg TID for daily goal net negative 2-3L  - BMP BID to assess for close electrolyte monitoring, goal K>4, Mg>2  - Inpatient vs outpatient stress testing once patient euvolemic  - Strict I/Os with daily weights  - 1.5L fluid restriction, low Na diet  - recommend starting GDMT when patient euvolemic and renal function improves

## 2023-05-03 NOTE — PROGRESS NOTES
[facility] ([place] [id number])  Cardiology  Progress Note    Patient Name: Suyapa Connelly  MRN: [id number]  Admission Date: 5/1/2023  Hospital Length of Stay: 0 days  Code Status: Full Code   Attending Physician: Flavia Delgado MD   Primary Care Physician: Magen Christensen MD  Expected Discharge Date: 5/4/2023  Principal Problem:Acute on chronic combined systolic and diastolic heart failure    Subjective:     Hospital Course:   No notes on file    Interval History: Continuing to have dyspnea, orthopnea, and LE edema bilaterally.  Patient states feeling uncomfortable last night, unable to sleep 2/2 dyspnea.  Denies palpitations, lightheadedness, dizziness, cough.  On room air.    Review of Systems   Cardiovascular:  Positive for chest pain, dyspnea on exertion and orthopnea. Negative for palpitations and syncope.   Respiratory:  Positive for shortness of breath. Negative for cough, hemoptysis, sputum production and wheezing.    Objective:     Vital Signs (Most Recent):  Temp: 98.4 °F (36.9 °C) (05/03/23 0845)  Pulse: 87 (05/03/23 0845)  Resp: 17 (05/03/23 0845)  BP: (!) 143/87 (05/03/23 0845)  SpO2: 100 % (05/03/23 0845)   Vital Signs (24h Range):  Temp:  [97.7 °F (36.5 °C)-98.4 °F (36.9 °C)] 98.4 °F (36.9 °C)  Pulse:  [78-90] 87  Resp:  [17-20] 17  SpO2:  [95 %-100 %] 100 %  BP: (133-149)/(58-87) 143/87     Weight: 95.3 kg (210 lb)  Body mass index is 34.95 kg/m².     SpO2: 100 %         Intake/Output Summary (Last 24 hours) at 5/3/2023 0907  Last data filed at 5/3/2023 0113  Gross per 24 hour   Intake 225 ml   Output 751 ml   Net -526 ml       Lines/Drains/Airways       Peripheral Intravenous Line  Duration                  External Jugular IV 08/13/22 1735 262 days         Peripheral IV - Single Lumen 05/01/23 1604 20 G Posterior;Right Hand 1 day                    Physical Exam  Vitals and nursing note reviewed.   Constitutional:       General: She is not in acute distress.     Appearance: She is  obese. She is not ill-appearing or toxic-appearing.   HENT:      Head: Normocephalic and atraumatic.      Mouth/Throat:      Mouth: Mucous membranes are moist.      Pharynx: No oropharyngeal exudate.   Eyes:      Extraocular Movements: Extraocular movements intact.      Conjunctiva/sclera: Conjunctivae normal.   Neck:      Vascular: JVD present.   Cardiovascular:      Rate and Rhythm: Normal rate and regular rhythm.   Pulmonary:      Effort: Pulmonary effort is normal. No respiratory distress.   Abdominal:      General: Bowel sounds are normal. There is distension.      Palpations: Abdomen is soft.      Tenderness: There is no abdominal tenderness. There is no guarding.   Musculoskeletal:      Cervical back: Normal range of motion and neck supple.      Right lower leg: Edema present.      Left lower leg: Edema present.      Comments: Patient with bilateral AKA. Edema of thighs   Skin:     General: Skin is warm.      Coloration: Skin is not jaundiced.      Findings: No bruising.   Neurological:      General: No focal deficit present.      Mental Status: She is alert and oriented to person, place, and time. Mental status is at baseline.       Significant Labs: CMP   Recent Labs   Lab 05/01/23  2136 05/02/23  0830 05/02/23  1924    139 138   K 4.2 4.3 4.5    110 109   CO2 19* 18* 19*   * 81 120*   BUN 58* 61* 56*   CREATININE 4.0* 3.6* 3.8*   CALCIUM 7.3* 7.4* 7.1*   PROT 6.6 6.2 6.0   ALBUMIN 2.2* 2.2* 2.0*   BILITOT 0.3 0.4 0.3   ALKPHOS 253* 223* 218*   AST 42* 35 27   ALT 47* 42 36   ANIONGAP 10 11 10   , CBC   Recent Labs   Lab 05/01/23  1602 05/02/23  0449 05/03/23  0248   WBC 5.18 5.47 5.37   HGB 9.9* 9.9* 10.5*   HCT 32.4* 33.8* 35.0*    297 293   , and All pertinent lab results from the last 24 hours have been reviewed.    Significant Imaging: Echocardiogram: Transthoracic echo (TTE) complete (Cupid Only):   Results for orders placed or performed during the hospital encounter of  "05/01/23   Echo   Result Value Ref Range    Ascending aorta 3.34 cm    STJ 2.58 cm    AV mean gradient 3 mmHg    Ao peak ty 1.21 m/s    Ao VTI 24.23 cm    IVS 0.89 0.6 - 1.1 cm    LA size 4.33 cm    Left Atrium Major Axis 6.50 cm    Left Atrium Minor Axis 5.59 cm    LVIDd 5.43 3.5 - 6.0 cm    LVIDs 4.79 (A) 2.1 - 4.0 cm    LVOT diameter 1.98 cm    LVOT peak VTI 13.63 cm    Posterior Wall 0.76 0.6 - 1.1 cm    MV Peak A Ty 0.49 m/s    E wave deceleration time 131.42 msec    MV Peak E Ty 0.64 m/s    PV Peak D Ty 0.34 m/s    PV Peak S Ty 0.27 m/s    RA Major Axis 5.14 cm    RA Width 3.91 cm    RVDD 4.13 cm    Sinus 3.01 cm    TAPSE 1.31 cm    TR Max Ty 2.65 m/s    TDI LATERAL 0.06 m/s    TDI SEPTAL 0.05 m/s    LA WIDTH 4.60 cm    MV stenosis pressure 1/2 time 38.11 ms    LV Diastolic Volume 143.29 mL    LV Systolic Volume 107.10 mL    RV S' 5.92 cm/s    LVOT peak ty 0.77 m/s    LA volume (mod) 72.01 cm3    MV "A" wave duration 16.56 msec    LV LATERAL E/E' RATIO 10.67 m/s    LV SEPTAL E/E' RATIO 12.80 m/s    FS 12 %    LA volume 101.76 cm3    LV mass 162.67 g    Left Ventricle Relative Wall Thickness 0.28 cm    AV valve area 1.73 cm2    AV Velocity Ratio 0.64     AV index (prosthetic) 0.56     MV valve area p 1/2 method 5.77 cm2    E/A ratio 1.31     Mean e' 0.06 m/s    Pulm vein S/D ratio 0.79     LVOT area 3.1 cm2    LVOT stroke volume 41.95 cm3    AV peak gradient 6 mmHg    E/E' ratio 11.64 m/s    LV Systolic Volume Index 53.0 mL/m2    LV Diastolic Volume Index 70.94 mL/m2    LA Volume Index 50.4 mL/m2    LV Mass Index 81 g/m2    Triscuspid Valve Regurgitation Peak Gradient 28 mmHg    LA Volume Index (Mod) 35.6 mL/m2    BSA 2.09 m2    Right Atrial Pressure (from IVC) 15 mmHg    EF 15 %    TV rest pulmonary artery pressure 43 mmHg    Narrative    · The left ventricle is mildly enlarged with severely decreased systolic   function. The estimated ejection fraction is 15%.  · There is severe left ventricular global " hypokinesis.  · Normal right ventricular size with low normal right ventricular systolic   function.  · Grade I left ventricular diastolic dysfunction.  · Biatrial enlargement.  · Mild-to-moderate mitral regurgitation.  · Severe tricuspid regurgitation.  · The estimated PA systolic pressure is 43 mmHg.  · Elevated central venous pressure (15 mmHg).        Assessment and Plan:       * Acute on chronic combined systolic and diastolic heart failure  Patient with significant cardiovascular history presenting for ADHF with newly depressed EF  Patient not on GDMT  Takes lasix 40mg daily    TTE 15% (previously 55%), biatrial enlargement, CVP 15, PASP 43, severe tricuspid regurgitation, not noted on prior in 2021    Recommendations:  - Transition from PO to IV Lasix 80mg TID for daily goal net negative 2-3L  - BMP BID to assess for close electrolyte monitoring, goal K>4, Mg>2  - Inpatient vs outpatient stress testing once patient euvolemic  - Strict I/Os with daily weights  - 1.5L fluid restriction, low Na diet  - recommend starting GDMT when patient euvolemic and renal function improves        VTE Risk Mitigation (From admission, onward)    None          Jacinto Adhikari MD  Cardiology  [facility] ([facility]

## 2023-05-03 NOTE — SUBJECTIVE & OBJECTIVE
Interval History: Continuing to have dyspnea, orthopnea, and LE edema bilaterally.  Patient states feeling uncomfortable last night, unable to sleep 2/2 dyspnea.  Denies palpitations, lightheadedness, dizziness, cough.  On room air.    Review of Systems   Cardiovascular:  Positive for chest pain, dyspnea on exertion and orthopnea. Negative for palpitations and syncope.   Respiratory:  Positive for shortness of breath. Negative for cough, hemoptysis, sputum production and wheezing.    Objective:     Vital Signs (Most Recent):  Temp: 98.4 °F (36.9 °C) (05/03/23 0845)  Pulse: 87 (05/03/23 0845)  Resp: 17 (05/03/23 0845)  BP: (!) 143/87 (05/03/23 0845)  SpO2: 100 % (05/03/23 0845)   Vital Signs (24h Range):  Temp:  [97.7 °F (36.5 °C)-98.4 °F (36.9 °C)] 98.4 °F (36.9 °C)  Pulse:  [78-90] 87  Resp:  [17-20] 17  SpO2:  [95 %-100 %] 100 %  BP: (133-149)/(58-87) 143/87     Weight: 95.3 kg (210 lb)  Body mass index is 34.95 kg/m².     SpO2: 100 %         Intake/Output Summary (Last 24 hours) at 5/3/2023 0907  Last data filed at 5/3/2023 0113  Gross per 24 hour   Intake 225 ml   Output 751 ml   Net -526 ml       Lines/Drains/Airways       Peripheral Intravenous Line  Duration                  External Jugular IV 08/13/22 1735 262 days         Peripheral IV - Single Lumen 05/01/23 1604 20 G Posterior;Right Hand 1 day                    Physical Exam  Vitals and nursing note reviewed.   Constitutional:       General: She is not in acute distress.     Appearance: She is obese. She is not ill-appearing or toxic-appearing.   HENT:      Head: Normocephalic and atraumatic.      Mouth/Throat:      Mouth: Mucous membranes are moist.      Pharynx: No oropharyngeal exudate.   Eyes:      Extraocular Movements: Extraocular movements intact.      Conjunctiva/sclera: Conjunctivae normal.   Neck:      Vascular: JVD present.   Cardiovascular:      Rate and Rhythm: Normal rate and regular rhythm.   Pulmonary:      Effort: Pulmonary effort is  normal. No respiratory distress.   Abdominal:      General: Bowel sounds are normal. There is distension.      Palpations: Abdomen is soft.      Tenderness: There is no abdominal tenderness. There is no guarding.   Musculoskeletal:      Cervical back: Normal range of motion and neck supple.      Right lower leg: Edema present.      Left lower leg: Edema present.      Comments: Patient with bilateral AKA. Edema of thighs   Skin:     General: Skin is warm.      Coloration: Skin is not jaundiced.      Findings: No bruising.   Neurological:      General: No focal deficit present.      Mental Status: She is alert and oriented to person, place, and time. Mental status is at baseline.       Significant Labs: CMP   Recent Labs   Lab 05/01/23  2136 05/02/23  0830 05/02/23  1924    139 138   K 4.2 4.3 4.5    110 109   CO2 19* 18* 19*   * 81 120*   BUN 58* 61* 56*   CREATININE 4.0* 3.6* 3.8*   CALCIUM 7.3* 7.4* 7.1*   PROT 6.6 6.2 6.0   ALBUMIN 2.2* 2.2* 2.0*   BILITOT 0.3 0.4 0.3   ALKPHOS 253* 223* 218*   AST 42* 35 27   ALT 47* 42 36   ANIONGAP 10 11 10   , CBC   Recent Labs   Lab 05/01/23  1602 05/02/23  0449 05/03/23  0248   WBC 5.18 5.47 5.37   HGB 9.9* 9.9* 10.5*   HCT 32.4* 33.8* 35.0*    297 293   , and All pertinent lab results from the last 24 hours have been reviewed.    Significant Imaging: Echocardiogram: Transthoracic echo (TTE) complete (Cupid Only):   Results for orders placed or performed during the hospital encounter of 05/01/23   Echo   Result Value Ref Range    Ascending aorta 3.34 cm    STJ 2.58 cm    AV mean gradient 3 mmHg    Ao peak ty 1.21 m/s    Ao VTI 24.23 cm    IVS 0.89 0.6 - 1.1 cm    LA size 4.33 cm    Left Atrium Major Axis 6.50 cm    Left Atrium Minor Axis 5.59 cm    LVIDd 5.43 3.5 - 6.0 cm    LVIDs 4.79 (A) 2.1 - 4.0 cm    LVOT diameter 1.98 cm    LVOT peak VTI 13.63 cm    Posterior Wall 0.76 0.6 - 1.1 cm    MV Peak A Ty 0.49 m/s    E wave deceleration time 131.42  "msec    MV Peak E Ty 0.64 m/s    PV Peak D Ty 0.34 m/s    PV Peak S Ty 0.27 m/s    RA Major Axis 5.14 cm    RA Width 3.91 cm    RVDD 4.13 cm    Sinus 3.01 cm    TAPSE 1.31 cm    TR Max Ty 2.65 m/s    TDI LATERAL 0.06 m/s    TDI SEPTAL 0.05 m/s    LA WIDTH 4.60 cm    MV stenosis pressure 1/2 time 38.11 ms    LV Diastolic Volume 143.29 mL    LV Systolic Volume 107.10 mL    RV S' 5.92 cm/s    LVOT peak ty 0.77 m/s    LA volume (mod) 72.01 cm3    MV "A" wave duration 16.56 msec    LV LATERAL E/E' RATIO 10.67 m/s    LV SEPTAL E/E' RATIO 12.80 m/s    FS 12 %    LA volume 101.76 cm3    LV mass 162.67 g    Left Ventricle Relative Wall Thickness 0.28 cm    AV valve area 1.73 cm2    AV Velocity Ratio 0.64     AV index (prosthetic) 0.56     MV valve area p 1/2 method 5.77 cm2    E/A ratio 1.31     Mean e' 0.06 m/s    Pulm vein S/D ratio 0.79     LVOT area 3.1 cm2    LVOT stroke volume 41.95 cm3    AV peak gradient 6 mmHg    E/E' ratio 11.64 m/s    LV Systolic Volume Index 53.0 mL/m2    LV Diastolic Volume Index 70.94 mL/m2    LA Volume Index 50.4 mL/m2    LV Mass Index 81 g/m2    Triscuspid Valve Regurgitation Peak Gradient 28 mmHg    LA Volume Index (Mod) 35.6 mL/m2    BSA 2.09 m2    Right Atrial Pressure (from IVC) 15 mmHg    EF 15 %    TV rest pulmonary artery pressure 43 mmHg    Narrative    · The left ventricle is mildly enlarged with severely decreased systolic   function. The estimated ejection fraction is 15%.  · There is severe left ventricular global hypokinesis.  · Normal right ventricular size with low normal right ventricular systolic   function.  · Grade I left ventricular diastolic dysfunction.  · Biatrial enlargement.  · Mild-to-moderate mitral regurgitation.  · Severe tricuspid regurgitation.  · The estimated PA systolic pressure is 43 mmHg.  · Elevated central venous pressure (15 mmHg).        "

## 2023-05-03 NOTE — CONSULTS
[facility]  Psychology  Consult Note      PSYCHOSOCIAL ASSESSMENT  Patient Name: Suyapa Connelly  MRN: [id number]  Date: 05/03/2023  Admission Date: 5/1/2023   Length of Stay: Hospital Day: 3   Attending Physician: Flavia Delgado MD    Inpatient consult to Psychology  Consult performed by: Aubrey Neil  Consult ordered by: Onur Bueno MD        HISTORY OF PRESENTING ILLNESS: 57 y.o. female with past medical history of CAD s/p CABG, CKD, DM, HTN, HLD, s/p bilateral AKA who presnets with chest pain, difficulty breathing, leg swelling. Labs on admission significant for an elevated BNP, hypocalcemia which corrected is 8.3, significant elevation in Creatinine and a slight elevation of troponin. CXR  Cardiomegaly with pulmonary interstitial edema, suggestive of pulmonary edema secondary to CHF.       CURRENT SYMPTOMS  Mood: Low mood lasting most of the day, most days of the week. Patient reports that her mood has gotten worse over the past month. Endorsed anhedonia, feelings of hopelessness and helplessness, changes in sleep and appetite, difficulty concentrating, and retardation in movement. Denied S/I.   Anxiety: Excessive worry, difficulty controlling worry especially around her physical health and worries about her , restlessness.   Substance Use: Denied substance use dependency or history of dependency   Alcohol: Denied    Tobacco: Denied    Marijuana: Occasionally via inhalation    Illicit drugs: Denied    Caffeine: Denied    Cognitive Functioning: Patient reported that its more difficulty to concentrate and that she can't think as quickly as she used to.   Pain: Patient reported some pain in her legs due to swelling, but does not have concern with pain management plan.   Sleep: Patient reported difficulty with sleep onset, nighttime awakenings, and early awakenings. Patient reported that her sleep has been deteriorating over the last month.   Appetite: Limited. Patient reports she has  not had an appetite for the last month.     PSYCHIATRIC HISTORY  Patient reported a history of depression but denied previous psychotherapy outpatient treatment or the prescribing of psychiatric medications. Patient denied previous hospitalizations for mental health, suicide attempts, or other self-harming behaviors. Patient previously met with Dr. Zack Walters during another hospital admission in 2021.     PSYCHIATRIC MEDICATIONS  Psychotherapeutics (From admission, onward)      Start     Stop Route Frequency Ordered    05/02/23 0900  sertraline tablet 50 mg         -- Oral Daily 05/01/23 1749            MEDICAL HISTORY  Past Medical History:   Diagnosis Date    Anxiety     Chronic pain syndrome     CKD (chronic kidney disease), stage III     Depression     Diabetes mellitus     type 2    Diabetes mellitus, type 2     GERD (gastroesophageal reflux disease)     Hyperemesis 3/23/2021    Hyperlipemia     Hypertension     Hypokalemia 3/23/2021    Infection of below knee amputation stump 3/12/2022    Myocardial infarction 2010    minor-caused by stress per pt.    Osteomyelitis     Osteomyelitis of left foot 4/30/2021    PVD (peripheral vascular disease)     Ulcer of left foot     Vaginal delivery     x1      Past Surgical History:   Procedure Laterality Date    ABOVE-KNEE AMPUTATION Left 5/18/2021    Procedure: AMPUTATION, ABOVE KNEE;  Surgeon: Teddy Huber MD;  Location: [address];  Service: Vascular;  Laterality: Left;    ABOVE-KNEE AMPUTATION Right 3/18/2022    Procedure: AMPUTATION, ABOVE KNEE;  Surgeon: DAYNE Florez II, MD;  Location: [facility] OR [address] [place] [address];  Service: Vascular;  Laterality: Right;    Angiogram - Right Extremity Right 7/9/15    angiogram-left leg  10/6/15    ANGIOGRAPHY OF LOWER EXTREMITY Left 4/29/2021    Procedure: ANGIOGRAM, LOWER EXTREMITY;  Surgeon: Teddy Huber MD;  Location: [address];  Service: Vascular;  Laterality: Left;    BELOW KNEE AMPUTATION OF LOWER EXTREMITY  Right 12/28/2021    Procedure: AMPUTATION, BELOW KNEE;  Surgeon: Kaitlyn Rojas MD;  Location: [facility] OR;  Service: General;  Laterality: Right;    CATHETERIZATION OF BOTH LEFT AND RIGHT HEART N/A 12/18/2019    Procedure: CATHETERIZATION, HEART, BOTH LEFT AND RIGHT;  Surgeon: Que Fernando III, MD;  Location: [facility] CATH LAB;  Service: Cardiology;  Laterality: N/A;    CORONARY ANGIOGRAPHY N/A 12/18/2019    Procedure: ANGIOGRAM, CORONARY ARTERY;  Surgeon: Que Fernando III, MD;  Location: [facility] CATH LAB;  Service: Cardiology;  Laterality: N/A;    CORONARY ANGIOGRAPHY INCLUDING BYPASS GRAFTS WITH CATHETERIZATION OF LEFT HEART N/A 7/28/2020    Procedure: ANGIOGRAM, CORONARY, INCLUDING BYPASS GRAFT, WITH LEFT HEART CATHETERIZATION, 9 am;  Surgeon: Rachel Easley MD;  Location: [facility] CATH LAB;  Service: Cardiology;  Laterality: N/A;    CORONARY ARTERY BYPASS GRAFT (CABG) N/A 1/14/2020    Procedure: CORONARY ARTERY BYPASS GRAFT (CABG) x 1     Off Pump;  Surgeon: Huang Altamirano MD;  Location: [facility] OR [address] [place] [address];  Service: Cardiovascular;  Laterality: N/A;    CREATION OF FEMORAL-TIBIAL ARTERY BYPASS Left 4/29/2021    Procedure: CREATION, BYPASS, ARTERIAL, FEMORAL TO ANTERIOR TIBIAL;  Surgeon: Teddy Huber MD;  Location: [facility] OR [address] [place] [address];  Service: Vascular;  Laterality: Left;    CREATION OF FEMOROPOPLITEAL ARTERIAL BYPASS USING GRAFT Left 8/18/2020    Procedure: CREATION, BYPASS, ARTERIAL, FEMORAL TO POPLITEAL, USING GRAFT, LEFT LOWER EXTREMITY;  Surgeon: Teddy Huber MD;  Location: [facility];  Service: Vascular;  Laterality: Left;  REQUEST 7:15 A.M. START----COVID NEGATIVE ON 8/17  1ST CASE STARTE PER LEANA ON 8/7/2020 @ 942AM-LO  RN PREOP 8/12/2020   T/S-----CLEARED BY CARDS-------PENDING INSURANCE    DEBRIDEMENT OF FOOT Left 9/8/2020    Procedure: DEBRIDEMENT, FOOT;  Surgeon: Rosio Mayes DPM;  Location: [facility];  Service: Podiatry;  Laterality: Left;  request neoxx .   RN Pre Op 9-4-2020,  Covid negative on 9/5/20. C A    DEBRIDEMENT OF FOOT  3/4/2021    Procedure: DEBRIDEMENT, FOOT;  Surgeon: Teddy Huber MD;  Location: [facility] OR;  Service: Vascular;;    DEBRIDEMENT OF FOOT Left 3/9/2021    Procedure: DEBRIDEMENT, FOOT, bone biopsy;  Surgeon: Rosio Mayes DPM;  Location: [facility] OR;  Service: Podiatry;  Laterality: Left;  Request neoxx---COVID IN AM  REQUESTING NOON START  RN Phone Pre op.On Blood thinners Plavix and Eliquis.  Covid am of surgery. C A    DEBRIDEMENT OF FOOT Left 5/4/2021    Procedure: DEBRIDEMENT, FOOT;  Surgeon: Farooq Morley DPM;  Location: [facility] OR [address] [place] [address];  Service: Podiatry;  Laterality: Left;    INSERTION OF TUNNELED CENTRAL VENOUS HEMODIALYSIS CATHETER N/A 1/27/2020    Procedure: Insertion, Catheter, Central Venous, Hemodialysis;  Surgeon: ESTEBAN Gomez III, MD;  Location: [facility] CATH LAB;  Service: Peripheral Vascular;  Laterality: N/A;    PERCUTANEOUS TRANSLUMINAL ANGIOPLASTY N/A 3/4/2021    Procedure: PTA (ANGIOPLASTY, PERCUTANEOUS, TRANSLUMINAL);  Surgeon: Teddy Huber MD;  Location: [facility] OR;  Service: Vascular;  Laterality: N/A;    REMOVAL OF ARTERIOVENOUS GRAFT Left 5/27/2021    Procedure: REMOVAL, GRAFT, LEFT LOWER EXTREMITY, WOUND EXPLORATION;  Surgeon: Teddy Huber MD;  [facility];  Service: Vascular;  Laterality: Left;    REMOVAL OF NAIL OF DIGIT Left 3/9/2021    Procedure: REMOVAL, NAIL, DIGIT;  Surgeon: Rosio Mayes DPM;  Location: [facility] OR;  Service: Podiatry;  Laterality: Left;    THROMBECTOMY Left 3/4/2021    Procedure: THROMBECTOMY, LEFT LOWER EXTREMITY BYPASS GRAFT, ANGIOGRAM, POSSIBLE INTERVENTION, POSSIBLE LEFT LOWER EXTREMITY BYPASS;  Surgeon: Teddy Huber MD;  Location: [facility] OR;  Service: Vascular;  Laterality: Left;    THROMBECTOMY Left 4/29/2021    Procedure: GRAFT THROMBECTOMY, LEFT LOWER EXTREMITY;  Surgeon: Teddy Huber MD;  [facility];  Service: Vascular;  Laterality: Left;   14.5 min  1179.85 mGy  341.01 Gycm2  240 ml dye    THROMBECTOMY  10/22/2021    Procedure: THROMBECTOMY;  Surgeon: Saad Arenas MD;  Location: [facility] CATH LAB/EP;  Service: Cardiology;;        SOCIAL HISTORY  Patient was born and raised in [place], [place] by her biological mother and father. Patient's parents  when she was [age] and her mother remarried. Patient has one sibling. 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 Patient has been  for 31 years and has one biological child and two step-children. Patient graduated with a degree in music and plays several instruments, with [employer] as her primary.     FAMILY PSYCHIATRIC HISTORY  Patient denied a family history of mental health concerns.     STRENGTHS & LIABILITIES  Strengths: good judgment, accepts guidance and feedback, intelligent, articulate, and good social support   Liabilities: AKA limits physical mobility, chronic health conditions.     INTERVENTION: Clinician used motivational interviewing to help patient effective manage mood and increase treatment engagement. Clinician and elicited patient's focus on gains, past examples of effective coping with swollen legs limiting mobility, and creating a plan for managing mood while waiting to heal.     MENTAL STATUS EXAM & OBSERVATIONS  Appearance:  Good grooming and hygiene. Dressed in hospital gown. Appeared stated age.      Orientation: Oriented x 4.   Speech: Normal rate, volume, and prosody.    Thought Processes: Logical and goal-oriented.      Thought Content: Normal. No suicidal or homicidal ideation. No indications of obsessions or delusions.   Mood: Dysphoric.   Affect: Full range, congruent with mood and session content..   Attention & Concentration: Intact. No deficits noted.   Insight: Good   Judgment: Good.   Behavioral Observations: Cooperative and engaged. Seated upright in bed. Good  eye contact. No signs of psychomotor agitation or retardation.     DIAGNOSTIC IMPRESSION & PLAN  Patient Active Problem List   Diagnosis    Peripheral vascular disease    PAD (peripheral artery disease)    Essential hypertension    Vitamin D deficiency    CAROLIN (generalized anxiety disorder)    Mixed hyperlipidemia    CAD (coronary artery disease)    S/P CABG x 1    Anemia    Paroxysmal atrial fibrillation    Hyponatremia    Critical lower limb ischemia    S/P femoral-popliteal bypass surgery    Thrombosis of femoro-popliteal bypass graft    Impaired functional mobility and endurance    Nephrotic syndrome    Uremia    Vascular graft infection    Hypoalbuminemia    Acute on chronic combined systolic and diastolic heart failure    Postmenopausal bleeding    Uterine fibroid    Muscle wasting and atrophy, not elsewhere classified, right thigh     Adjustment disorder with mixed anxiety and depressed mood    Phantom limb syndrome with pain    Status post above knee amputation, left    Peripheral neuropathic pain    Impaired eye movement    Myoclonus    Dermatitis associated with moisture    Uses self-applied continuous glucose monitoring device    Status post above-knee amputation of both lower extremities    Leg swelling    Hyperkalemia    Debility    Metabolic acidosis    Above-knee amputation of right lower extremity    CKD (chronic kidney disease), stage IV    Acute hypoxemic respiratory failure    ROSLYN (acute kidney injury)       Impression:  57 y.o. female with past medical history of CAD s/p CABG, CKD, DM, HTN, HLD, s/p bilateral AKA who presnets with chest pain, difficulty breathing, leg swelling. Psychology was consulted to assess for depression and help patient manage her mood in regards to her current state of health. Patient meets the criteria for MDD. These depressive symptoms existed before the swelling in her legs limited her mobility but have been exacerbated by the deterioration of her health. The patient  also appears to meet the criteria for an adjustment disorder with anxiety related to her hospital admission and increased swelling in her legs over the past month. Patient appears to be coping poorly but has good social support and given her background in mental health the knowledge and capacity for good coping skills. Clinician and patient used motivational interviewing to identify previous uses of positive coping skills and emphasize the need for rest and physical recovery as an important component of depression symptom abatement.     Plan: Psychology will continue to follow.     Recommendations  - Will provide resources for outpatient psychotherapy follow up.   - Reiterate to patient that rest and physical recovery are the first steps in improving her mood.       Thank you for the opportunity to participate in this patient's care.      Length of Service: 43 minutes    Aubrey Neil   Psychology Resident   Dept. of Psychiatry  [facility]

## 2023-05-03 NOTE — PLAN OF CARE
PT Evaluation completed and PT POC established.    Problem: Physical Therapy  Goal: Physical Therapy Goal  Description: Goals to be met by: 2023     Patient will increase functional independence with mobility by performin. Supine to sit with MInimal Assistance  2. Sit to supine with MInimal Assistance  3. Bed to chair transfer with Minimal Assistance using LRAD  4. Wheelchair propulsion x50 feet with Minimal Assistance using bilateral uppper extremities  5. Spouse verbalizes and demonstrates understanding on family training with focus on transfers     Outcome: Ongoing, Progressing

## 2023-05-03 NOTE — PLAN OF CARE
Problem: Adult Inpatient Plan of Care  Goal: Plan of Care Review  Outcome: Ongoing, Progressing  Goal: Patient-Specific Goal (Individualized)  Outcome: Ongoing, Progressing  Goal: Absence of Hospital-Acquired Illness or Injury  Outcome: Ongoing, Progressing  Goal: Optimal Comfort and Wellbeing  Outcome: Ongoing, Progressing  Goal: Readiness for Transition of Care  Outcome: Ongoing, Progressing     Problem: Fluid and Electrolyte Imbalance (Acute Kidney Injury/Impairment)  Goal: Fluid and Electrolyte Balance  Outcome: Ongoing, Progressing     Problem: Oral Intake Inadequate (Acute Kidney Injury/Impairment)  Goal: Optimal Nutrition Intake  Outcome: Ongoing, Progressing     Problem: Renal Function Impairment (Acute Kidney Injury/Impairment)  Goal: Effective Renal Function  Outcome: Ongoing, Progressing     Problem: Skin Injury Risk Increased  Goal: Skin Health and Integrity  Outcome: Ongoing, Progressing   Fall precaution maintained this shift call bell in reach. Bed in low position. Instructed pt to call for assistance. No acute distress noted over night. Vs stable. All concerns addressed and answered.

## 2023-05-03 NOTE — PLAN OF CARE
05/03/23 0906   Post-Acute Status   Post-Acute Authorization Home Health   Home Health Status Referrals Sent   Patient choice form signed by patient/caregiver List with quality metrics by geographic area provided   Discharge Delays None known at this time   Discharge Plan   Discharge Plan A Home Health     6 referrals sent for Home Health via careport for companies listed by geographic area    Will send orders once recieved      Hazel Hart CD, MSW, LMSW, RSW   Case Management  [facility]  Email: [email or web address]

## 2023-05-03 NOTE — ASSESSMENT & PLAN NOTE
Pt with increased Cr to 3.9 from 2.0 baseline. Possibly due to renovascular congestion from volume overload.     -[facility]  -Strict Is/Os  -Avoid nephrotoxic agents  -Lasix 80 IV TID  -Daily CMPs

## 2023-05-03 NOTE — ASSESSMENT & PLAN NOTE
History of CAD with retrosternal chest pain. EKG negative to ST changes. Nonspecific T wave inversions.     -Cardiac tele  -EKG prn

## 2023-05-03 NOTE — PT/OT/SLP EVAL
"Physical Therapy Evaluation    Patient Name:  Suyapa Connelly   MRN:  [id number]    Recommendations:     Discharge Recommendations: other (see comments)   Discharge Equipment Recommendations: drop arm commode, hospital bed, lift device   Barriers to discharge: Pt currently requiring increased level of assistance with mobility    Assessment:     Suyapa Connelly is a 57 y.o. female admitted with a medical diagnosis of Acute on chronic combined systolic and diastolic heart failure.  She presents with the following impairments/functional limitations: weakness, impaired endurance, decreased upper extremity function, decreased lower extremity function, edema.    Pleasant, cooperative, requires increased time for all mobility. Pt required 1 person assist for bed mobility and tolerated static sitting x10 min with good sitting balance. Pt will benefit from skilled PT services while in house in order to address the aforementioned deficits and to reduce caregiver stress and faulty movement patterns.      Rehab Prognosis: Good; patient would benefit from acute skilled PT services to address these deficits and reach maximum level of function.    Recent Surgery: * No surgery found *      Plan:     During this hospitalization, patient to be seen 3 x/week to address the identified rehab impairments via therapeutic activities, therapeutic exercises, neuromuscular re-education, wheelchair management/training and progress toward the following goals:    Plan of Care Expires:  05/03/23    Subjective     "I believe you should treat everyone kindly if you want the same in return"    Pain/Comfort:  Pain Rating 1: 0/10  Pain Rating Post-Intervention 1: 0/10    Patients cultural, spiritual, Tenriism conflicts given the current situation: no    Living Environment:  Per pt: pt, spouse (59 y/o primary caregiver), and mother-in-law (dx dementia) reside in [facility] with ramp access. Pt has a walk-in shower however at baseline receives sponge baths " from spouse.   Prior to admission, patients level of function was assist with all mobility; spouse has been utilizing gait belt around abdomen to provide max-total assist transfers, which has caused breakdown in skin. Pt reported she's able to perform scoot transfer to toilet, if toilet is at same height as wheelchair; however if not available, pt typically uses briefs to void. Equipment used at home: wheelchair.  DME owned (not currently used): none.  Upon discharge, patient will have assistance from spouse.    Objective:     Communicated with RN prior to session.  Patient found HOB elevated with peripheral IV  upon PT entry to room.    General Precautions: Standard, fall  Orthopedic Precautions:N/A   Braces: N/A  Respiratory Status: Room air    Exams:  RLE ROM: WFL  RLE Strength: Grossly 3-/5 hip flexion, abd, add  LLE ROM: WFL  LLE Strength: Grossly 3-/5 hip flexion, abd, add    Functional Mobility:  Bed Mobility:     Scooting: moderate assistance  Supine to Sit with HOB elevated: moderate assistance  Sit to Supine with HOB elevated: moderate assistance  Balance:   Good sitting balance      AM-PAC 6 CLICK MOBILITY  Total Score:9       Treatment & Education:  Extensive education provided on transfer training, caregiver body mechanics, DME needs. Pt reported her bed height is high, approximately ~8-10 inches higher than her wheelchair. Pt and caregiver would benefit from jany lift to reduce caregiver stress and faulty movement patterns.  Discussed following up with OP OT for R hand trigger finger  Discussed gentle ROM to maintain finger ROM    Educated pt on PT role/POC  Educated pt on importance of OOB activity  Pt educated on proper body mechanics, safety techniques, and energy conservation with PT facilitation and cueing throughout session   Pt verbalized understanding        Patient left HOB elevated with all lines intact, call button in reach, and RN notified.    GOALS:   Multidisciplinary Problems        Physical Therapy Goals          Problem: Physical Therapy    Goal Priority Disciplines Outcome Goal Variances Interventions   Physical Therapy Goal     PT, PT/OT Ongoing, Progressing     Description: Goals to be met by: 2023     Patient will increase functional independence with mobility by performin. Supine to sit with MInimal Assistance  2. Sit to supine with MInimal Assistance  3. Bed to chair transfer with Minimal Assistance using LRAD  4. Wheelchair propulsion x50 feet with Minimal Assistance using bilateral uppper extremities  5. Spouse verbalizes and demonstrates understanding on family training with focus on transfers                          History:     Past Medical History:   Diagnosis Date    Anxiety     Chronic pain syndrome     CKD (chronic kidney disease), stage III     Depression     Diabetes mellitus     type 2    Diabetes mellitus, type 2     GERD (gastroesophageal reflux disease)     Hyperemesis 3/23/2021    Hyperlipemia     Hypertension     Hypokalemia 3/23/2021    Infection of below knee amputation stump 3/12/2022    Myocardial infarction 2010    minor-caused by stress per pt.    Osteomyelitis     Osteomyelitis of left foot 2021    PVD (peripheral vascular disease)     Ulcer of left foot     Vaginal delivery     x1       Past Surgical History:   Procedure Laterality Date    ABOVE-KNEE AMPUTATION Left 2021    Procedure: AMPUTATION, ABOVE KNEE;  Surgeon: Teddy Huber MD;  Location: [address];  Service: Vascular;  Laterality: Left;    ABOVE-KNEE AMPUTATION Right 3/18/2022    Procedure: AMPUTATION, ABOVE KNEE;  Surgeon: DAYNE Florez II, MD;  Location: [facility] OR [address] [place] [address];  Service: Vascular;  Laterality: Right;    Angiogram - Right Extremity Right 7/9/15    angiogram-left leg  10/6/15    ANGIOGRAPHY OF LOWER EXTREMITY Left 2021    Procedure: ANGIOGRAM, LOWER EXTREMITY;  Surgeon: Teddy Huber MD;  Location: [address];  Service: Vascular;   Laterality: Left;    BELOW KNEE AMPUTATION OF LOWER EXTREMITY Right 12/28/2021    Procedure: AMPUTATION, BELOW KNEE;  Surgeon: Kaitlyn Rojas MD;  Location: [facility];  Service: General;  Laterality: Right;    CATHETERIZATION OF BOTH LEFT AND RIGHT HEART N/A 12/18/2019    Procedure: CATHETERIZATION, HEART, BOTH LEFT AND RIGHT;  Surgeon: Que Fernando III, MD;  Location: [facility] CATH LAB;  Service: Cardiology;  Laterality: N/A;    CORONARY ANGIOGRAPHY N/A 12/18/2019    Procedure: ANGIOGRAM, CORONARY ARTERY;  Surgeon: Que Fernando III, MD;  Location: [facility] CATH LAB;  Service: Cardiology;  Laterality: N/A;    CORONARY ANGIOGRAPHY INCLUDING BYPASS GRAFTS WITH CATHETERIZATION OF LEFT HEART N/A 7/28/2020    Procedure: ANGIOGRAM, CORONARY, INCLUDING BYPASS GRAFT, WITH LEFT HEART CATHETERIZATION, 9 am;  Surgeon: Rachel Easley MD;  Location: [facility] CATH LAB;  Service: Cardiology;  Laterality: N/A;    CORONARY ARTERY BYPASS GRAFT (CABG) N/A 1/14/2020    Procedure: CORONARY ARTERY BYPASS GRAFT (CABG) x 1     Off Pump;  Surgeon: Huang Altamirano MD;  Location: [address];  Service: Cardiovascular;  Laterality: N/A;    CREATION OF FEMORAL-TIBIAL ARTERY BYPASS Left 4/29/2021    Procedure: CREATION, BYPASS, ARTERIAL, FEMORAL TO ANTERIOR TIBIAL;  Surgeon: Teddy Huber MD;  Location: [address];  Service: Vascular;  Laterality: Left;    CREATION OF FEMOROPOPLITEAL ARTERIAL BYPASS USING GRAFT Left 8/18/2020    Procedure: CREATION, BYPASS, ARTERIAL, FEMORAL TO POPLITEAL, USING GRAFT, LEFT LOWER EXTREMITY;  Surgeon: Teddy Huber MD;  Location: [facility];  Service: Vascular;  Laterality: Left;  REQUEST 7:15 A.M. START----COVID NEGATIVE ON 8/17  1ST CASE STARTE PER LEANA ON 8/7/2020 @ 942AM-LO  RN PREOP 8/12/2020   T/S-----CLEARED BY CARDS-------PENDING INSURANCE    DEBRIDEMENT OF FOOT Left 9/8/2020    Procedure: DEBRIDEMENT, FOOT;  Surgeon: Rosio Mayes DPM;  Location: [facility];  Service:  Podiatry;  Laterality: Left;  request neoxx .   RN Pre Op 9-4-2020, Covid negative on 9/5/20. C A    DEBRIDEMENT OF FOOT  3/4/2021    Procedure: DEBRIDEMENT, FOOT;  Surgeon: Teddy Huber MD;  Location: [facility] OR;  Service: Vascular;;    DEBRIDEMENT OF FOOT Left 3/9/2021    Procedure: DEBRIDEMENT, FOOT, bone biopsy;  Surgeon: Rosio Mayes DPM;  Location: [facility] OR;  Service: Podiatry;  Laterality: Left;  Request neoxx---COVID IN AM  REQUESTING NOON START  RN Phone Pre op.On Blood thinners Plavix and Eliquis.  Covid am of surgery. C A    DEBRIDEMENT OF FOOT Left 5/4/2021    Procedure: DEBRIDEMENT, FOOT;  Surgeon: Farooq Morley DPM;  Location: [facility] OR 2ND FLR;  Service: Podiatry;  Laterality: Left;    INSERTION OF TUNNELED CENTRAL VENOUS HEMODIALYSIS CATHETER N/A 1/27/2020    Procedure: Insertion, Catheter, Central Venous, Hemodialysis;  Surgeon: ESTEABN Gomez III, MD;  Location: [facility] CATH LAB;  Service: Peripheral Vascular;  Laterality: N/A;    PERCUTANEOUS TRANSLUMINAL ANGIOPLASTY N/A 3/4/2021    Procedure: PTA (ANGIOPLASTY, PERCUTANEOUS, TRANSLUMINAL);  Surgeon: Teddy Huber MD;  Location: [facility] OR;  Service: Vascular;  Laterality: N/A;    REMOVAL OF ARTERIOVENOUS GRAFT Left 5/27/2021    Procedure: REMOVAL, GRAFT, LEFT LOWER EXTREMITY, WOUND EXPLORATION;  Surgeon: Teddy Huber MD;  Location: [facility] OR 2ND FLR;  Service: Vascular;  Laterality: Left;    REMOVAL OF NAIL OF DIGIT Left 3/9/2021    Procedure: REMOVAL, NAIL, DIGIT;  Surgeon: Rosio Mayes DPM;  Location: [facility] OR;  Service: Podiatry;  Laterality: Left;    THROMBECTOMY Left 3/4/2021    Procedure: THROMBECTOMY, LEFT LOWER EXTREMITY BYPASS GRAFT, ANGIOGRAM, POSSIBLE INTERVENTION, POSSIBLE LEFT LOWER EXTREMITY BYPASS;  Surgeon: Teddy Huber MD;  Location: [facility] OR;  Service: Vascular;  Laterality: Left;    THROMBECTOMY Left 4/29/2021    Procedure: GRAFT THROMBECTOMY, LEFT LOWER EXTREMITY;  Surgeon: Teddy Huber MD;   [facility];  Service: Vascular;  Laterality: Left;  14.5 min  1179.85 mGy  341.01 Gycm2  240 ml dye    THROMBECTOMY  10/22/2021    Procedure: THROMBECTOMY;  Surgeon: Saad Arenas MD;  Location: [facility] CATH LAB/EP;  Service: Cardiology;;       Time Tracking:     PT Received On: 05/03/23  PT Start Time: 1018     PT Stop Time: 1053  PT Total Time (min): 35 min     Billable Minutes: Evaluation 10, Therapeutic Activity 15, and Neuromuscular Re-education 10      05/03/2023

## 2023-05-03 NOTE — PROGRESS NOTES
[facility] - Intensive Care ([address] Medicine  Progress Note    Patient Name: Suyapa Connelly  MRN: [id number]  Patient Class: OP- Observation   Admission Date: 5/1/2023  Length of Stay: 0 days  Attending Physician: Flavia Delgado MD  Primary Care Provider: Magen Christensen MD        Subjective:     Principal Problem:Acute on chronic combined systolic and diastolic heart failure        HPI:  57 y.o. female with past medical history of CAD s/p CABG, CKD, DM, HTN, HLD, s/p bilateral AKA who presnets with chest pain, difficulty breathing, leg swelling.     Per ED She has been unable to access any of her medications for the last 3 months. Over the last few months she has noted worsening shortness of breath, particulatly orthopnea, and leg swelling. In the last two weeks she endorses associated abdominal pain and swelling, reduced appetite, and pleuritic chest pain with deep inspiration. She endorses occasional lighthededness with changes in position. Last week she had an episode where she was unable to breathe for about two minutes which was very upsetting for her and her , but she recovered and did not seek care at that time. Today, she complains of significant difficulty breathing, swelling in her legs and abdomen, and reduced appetite.     No headaches, visual changes, URI symptoms, palpitations, nausea, vomiting, diarrhea, blood in her stool, dysuria, hematuria, numbness or weakness in her extremities. She does endorse occasional paresthesias associated with her chronic phantom limb pain. She additionally has had increased difficulties with depression recently, denies suicidal or homicidal ideation, but has had considerable stress and low mood.      Of note, the patient saw a new primary care provider 4/21 for similar concerns who recommended she proceed to the ED at that time, but they were unable due to financial and geographic concerns. She lives two hours away from [facility] and her   cares for her and elderly mother with dementia. She has had decreased access to medical care over the last year, and unable to access most of her prescriptions. She had a 90 day supply of four medications (lasix, statin, clopidogrel, sertraline) but these were accidentally lost and she has been unable to afford a replacement. They present today for evaluation and to reestablish care and access to her prescriptions.     Labs on admission significant for an elevated BNP, hypocalcemia which corrected is 8.3, significant elevation in Creatinine and a slight elevation of troponin    CXR  Cardiomegaly with pulmonary interstitial edema, suggestive of pulmonary edema secondary to CHF.      Overview/Hospital Course:  No notes on file    Interval History: Pt feeling much better today. Breathing 100% on room air. Still edematous. Complaining of her trigger finger. PT has seen patient and she is working on stretching it out.     Review of Systems   Constitutional:  Negative for chills, fatigue and fever.   HENT:  Negative for congestion, dental problem, facial swelling, postnasal drip, rhinorrhea, sore throat and trouble swallowing.    Eyes:  Negative for photophobia and pain.   Respiratory:  Negative for cough, choking, chest tightness, shortness of breath and wheezing.    Cardiovascular:  Positive for leg swelling. Negative for chest pain.   Gastrointestinal:  Negative for abdominal distention, abdominal pain, diarrhea, nausea and vomiting.   Endocrine: Negative.    Genitourinary:  Negative for dysuria, flank pain, frequency and hematuria.   Musculoskeletal:  Negative for back pain.   Skin:  Negative for pallor and rash.   Allergic/Immunologic: Negative.    Neurological:  Negative for dizziness, syncope, weakness, numbness and headaches.   Psychiatric/Behavioral:  The patient is not nervous/anxious.    Objective:     Vital Signs (Most Recent):  Temp: 98 °F (36.7 °C) (05/03/23 1126)  Pulse: 93 (05/03/23 1126)  Resp: 19  (05/03/23 1126)  BP: 134/85 (05/03/23 1126)  SpO2: 100 % (05/03/23 1126) Vital Signs (24h Range):  Temp:  [97.7 °F (36.5 °C)-98.4 °F (36.9 °C)] 98 °F (36.7 °C)  Pulse:  [84-93] 93  Resp:  [17-19] 19  SpO2:  [95 %-100 %] 100 %  BP: (133-149)/(69-87) 134/85     Weight: 95.3 kg (210 lb)  Body mass index is 34.95 kg/m².    Intake/Output Summary (Last 24 hours) at 5/3/2023 1416  Last data filed at 5/3/2023 0113  Gross per 24 hour   Intake 225 ml   Output 751 ml   Net -526 ml      Physical Exam  Vitals and nursing note reviewed.   Constitutional:       General: She is not in acute distress.     Appearance: Normal appearance. She is obese. She is not ill-appearing or diaphoretic.   HENT:      Head: Normocephalic and atraumatic.      Right Ear: External ear normal.      Left Ear: External ear normal.      Nose: Nose normal.      Mouth/Throat:      Mouth: Mucous membranes are moist.      Pharynx: Oropharynx is clear.   Eyes:      General: No scleral icterus.     Extraocular Movements: Extraocular movements intact.      Conjunctiva/sclera: Conjunctivae normal.      Pupils: Pupils are equal, round, and reactive to light.   Cardiovascular:      Rate and Rhythm: Normal rate and regular rhythm.      Pulses: Normal pulses.      Heart sounds: Normal heart sounds. No murmur heard.  Pulmonary:      Effort: Pulmonary effort is normal. No respiratory distress.      Breath sounds: Normal breath sounds. No stridor. No wheezing or rhonchi.   Chest:      Chest wall: No tenderness.   Abdominal:      General: Abdomen is flat. There is no distension.      Palpations: Abdomen is soft.      Tenderness: There is no abdominal tenderness. There is no guarding or rebound.   Musculoskeletal:         General: Swelling present. No tenderness. Normal range of motion.      Cervical back: Normal range of motion and neck supple. No rigidity or tenderness.      Right lower leg: Edema present.      Left lower leg: Edema present.      Comments: Trigger  finger R middle finger   Skin:     General: Skin is warm and dry.      Capillary Refill: Capillary refill takes less than 2 seconds.      Coloration: Skin is not jaundiced.      Findings: No erythema.   Neurological:      General: No focal deficit present.      Mental Status: She is alert and oriented to person, place, and time.      Cranial Nerves: No cranial nerve deficit.      Motor: No weakness.   Psychiatric:         Mood and Affect: Mood normal.         Behavior: Behavior normal.       Significant Labs: All pertinent labs within the past 24 hours have been reviewed.    Significant Imaging: I have reviewed all pertinent imaging results/findings within the past 24 hours.      Assessment/Plan:      * Acute on chronic combined systolic and diastolic heart failure  Last Echo 6/21 showing    The estimated ejection fraction is 55%.  The left ventricle is normal in size with normal systolic function.  Normal left ventricular diastolic function.  Normal right ventricular size with normal right ventricular systolic function.  Mild tricuspid regurgitation.  The estimated PA systolic pressure is 31 mmHg.  Normal central venous pressure (3 mmHg).    Echo    Result Date: 5/2/2023  · The left ventricle is mildly enlarged with severely decreased systolic   function. The estimated ejection fraction is 15%.  · There is severe left ventricular global hypokinesis.  · Normal right ventricular size with low normal right ventricular systolic   function.  · Grade I left ventricular diastolic dysfunction.  · Biatrial enlargement.  · Mild-to-moderate mitral regurgitation.  · Severe tricuspid regurgitation.  · The estimated PA systolic pressure is 43 mmHg.  · Elevated central venous pressure (15 mmHg).           -IV Diuresis  -Qq4 BMPs with electrolyte repletion as needed; goal K>4, Mg>2  -Cardiology consult for acute decompensated heart failure with reduced EF   -Transition from PO to IV Lasix 80mg TID for daily goal net negative  2-3L  - Inpatient vs outpatient stress testing once patient euvolemic  - Strict I/Os with daily weights  - 1.5L fluid restriction, low Na diet  - recommend starting GDMT when patient euvolemic and renal function improves        ROSLYN (acute kidney injury)  Pt with increased Cr to 3.9 from 2.0 baseline. Possibly due to renovascular congestion from volume overload.     -Kerns  -Strict Is/Os  -Avoid nephrotoxic agents  -Lasix 80 IV TID  -Daily CMPs        Acute hypoxemic respiratory failure  See Acute on chronic CHF    CKD (chronic kidney disease), stage IV  History of CKD. See ROSLYN.      Above-knee amputation of right lower extremity  Prior history consistent with exam      Uses self-applied continuous glucose monitoring device  POCT glucose and daily CMPs    -Restart insulin for hyperglycemia      Dermatitis associated with moisture  Large body habitus with dermatitis to folds. Will monitor for infection.    -Zinc based powder  -Topical abx if indicated      Status post above knee amputation, left  History of.      Hypoalbuminemia  Pt with poor PO intake reported. Pt eats one meal a day and has associated nausea and vomiting. Will try antiemetics before feeds to improve intake. Will monitor daily intake and calorie replacement.     -Calorie count  -Diabetic/Renal diet  -Antiemetics before meals; Zofran 4 IV PRN  -Monitor Qtc for prolongation with antiemetics      Impaired functional mobility and endurance  PT/OT for bedbound patient      Paroxysmal atrial fibrillation  Continue home rate control  NSR on EKG        Anemia  Asymptomatic. Higher than 8 months ago. No signs of acute bleed at this time. Haptoglobin elevated.    -CBC daily  -Transfuse if <7        S/P CABG x 1  History of. Sternal scar well healed.   ASA and Eliquis on discharge      CAD (coronary artery disease)  History of CAD with retrosternal chest pain. EKG negative to ST changes. Nonspecific T wave inversions.     -Cardiac tele  -EKG prn      CAROLIN  (generalized anxiety disorder)  Continue home anxiety medications      Essential hypertension  Continue home antihypertensives      PAD (peripheral artery disease)  History of bilateral AKA.           VTE Risk Mitigation (From admission, onward)      None            Discharge Planning   DEVAN: 5/8/2023     Code Status: Full Code   Is the patient medically ready for discharge?: No    Reason for patient still in hospital (select all that apply): Patient trending condition, Treatment and Consult recommendations  Discharge Plan A: Home Health   Discharge Delays: None known at this time    Onur Bueno MD  Department of Hospital Medicine   [facility] - Intensive Care ([place] [id number])                      05/03/2023                             STAFF PHYSICIAN NOTE                                   Attending Attestation for Rounds with Resident  I have reviewed and concur with the resident's history, physical, assessment, and plan.  I have personally interviewed and examined the patient at bedside and agree with the resident's findings.      57 y.o. female with past medical history of CAD s/p CABG, CKD, DM, HTN, HLD, s/p bilateral AKA who presnets with chest pain, difficulty breathing, leg swelling. Diuresing. CHF exacerbation, ECHO- EF 15%. Cards consulted. Right hand- has trigger finger and contractures. Suspect diabetic neuropathy.  will need medication instructions and supervision, and f/u for adherence. Has cardiorenal ROSLYN. Diuresing. Needs f/u Cardiology.                                   Flavia Delgado MD  Senior Hospitalist  Department of [facility] Medicine  [facility]  [id number], [phone]

## 2023-05-03 NOTE — ASSESSMENT & PLAN NOTE
Last Echo 6/21 showing     The estimated ejection fraction is 55%.   The left ventricle is normal in size with normal systolic function.   Normal left ventricular diastolic function.   Normal right ventricular size with normal right ventricular systolic function.   Mild tricuspid regurgitation.   The estimated PA systolic pressure is 31 mmHg.   Normal central venous pressure (3 mmHg).    Echo    Result Date: 5/2/2023  · The left ventricle is mildly enlarged with severely decreased systolic   function. The estimated ejection fraction is 15%.  · There is severe left ventricular global hypokinesis.  · Normal right ventricular size with low normal right ventricular systolic   function.  · Grade I left ventricular diastolic dysfunction.  · Biatrial enlargement.  · Mild-to-moderate mitral regurgitation.  · Severe tricuspid regurgitation.  · The estimated PA systolic pressure is 43 mmHg.  · Elevated central venous pressure (15 mmHg).           -IV Diuresis  -Qq4 BMPs with electrolyte repletion as needed; goal K>4, Mg>2  -Cardiology consult for acute decompensated heart failure with reduced EF   -Transition from PO to IV Lasix 80mg TID for daily goal net negative 2-3L  - Inpatient vs outpatient stress testing once patient euvolemic  - Strict I/Os with daily weights  - 1.5L fluid restriction, low Na diet  - recommend starting GDMT when patient euvolemic and renal function improves

## 2023-05-04 LAB
ANION GAP SERPL CALC-SCNC: 11 MMOL/L (ref 8–16)
ANION GAP SERPL CALC-SCNC: 13 MMOL/L (ref 8–16)
BASOPHILS # BLD AUTO: 0.06 K/UL (ref 0–0.2)
BASOPHILS NFR BLD: 1.1 % (ref 0–1.9)
BUN SERPL-MCNC: 55 MG/DL (ref 6–20)
BUN SERPL-MCNC: 56 MG/DL (ref 6–20)
CALCIUM SERPL-MCNC: 7.2 MG/DL (ref 8.7–10.5)
CALCIUM SERPL-MCNC: 7.5 MG/DL (ref 8.7–10.5)
CHLORIDE SERPL-SCNC: 106 MMOL/L (ref 95–110)
CHLORIDE SERPL-SCNC: 106 MMOL/L (ref 95–110)
CO2 SERPL-SCNC: 19 MMOL/L (ref 23–29)
CO2 SERPL-SCNC: 20 MMOL/L (ref 23–29)
CREAT SERPL-MCNC: 3.8 MG/DL (ref 0.5–1.4)
CREAT SERPL-MCNC: 3.8 MG/DL (ref 0.5–1.4)
DIFFERENTIAL METHOD: ABNORMAL
EOSINOPHIL # BLD AUTO: 0.1 K/UL (ref 0–0.5)
EOSINOPHIL NFR BLD: 2.6 % (ref 0–8)
ERYTHROCYTE [DISTWIDTH] IN BLOOD BY AUTOMATED COUNT: 14.9 % (ref 11.5–14.5)
EST. GFR  (NO RACE VARIABLE): 13.3 ML/MIN/1.73 M^2
EST. GFR  (NO RACE VARIABLE): 13.3 ML/MIN/1.73 M^2
GLUCOSE SERPL-MCNC: 109 MG/DL (ref 70–110)
GLUCOSE SERPL-MCNC: 158 MG/DL (ref 70–110)
HCT VFR BLD AUTO: 34.6 % (ref 37–48.5)
HGB BLD-MCNC: 10.4 G/DL (ref 12–16)
IMM GRANULOCYTES # BLD AUTO: 0.01 K/UL (ref 0–0.04)
IMM GRANULOCYTES NFR BLD AUTO: 0.2 % (ref 0–0.5)
LYMPHOCYTES # BLD AUTO: 1.1 K/UL (ref 1–4.8)
LYMPHOCYTES NFR BLD: 20.4 % (ref 18–48)
MAGNESIUM SERPL-MCNC: 1.7 MG/DL (ref 1.6–2.6)
MAGNESIUM SERPL-MCNC: 1.8 MG/DL (ref 1.6–2.6)
MCH RBC QN AUTO: 30.4 PG (ref 27–31)
MCHC RBC AUTO-ENTMCNC: 30.1 G/DL (ref 32–36)
MCV RBC AUTO: 101 FL (ref 82–98)
MONOCYTES # BLD AUTO: 0.6 K/UL (ref 0.3–1)
MONOCYTES NFR BLD: 10.8 % (ref 4–15)
NEUTROPHILS # BLD AUTO: 3.5 K/UL (ref 1.8–7.7)
NEUTROPHILS NFR BLD: 64.9 % (ref 38–73)
NRBC BLD-RTO: 0 /100 WBC
PHOSPHATE SERPL-MCNC: 5.5 MG/DL (ref 2.7–4.5)
PLATELET # BLD AUTO: 307 K/UL (ref 150–450)
PMV BLD AUTO: 10.8 FL (ref 9.2–12.9)
POCT GLUCOSE: 115 MG/DL (ref 70–110)
POCT GLUCOSE: 125 MG/DL (ref 70–110)
POCT GLUCOSE: 152 MG/DL (ref 70–110)
POCT GLUCOSE: 86 MG/DL (ref 70–110)
POTASSIUM SERPL-SCNC: 4.4 MMOL/L (ref 3.5–5.1)
POTASSIUM SERPL-SCNC: 4.5 MMOL/L (ref 3.5–5.1)
RBC # BLD AUTO: 3.42 M/UL (ref 4–5.4)
SODIUM SERPL-SCNC: 137 MMOL/L (ref 136–145)
SODIUM SERPL-SCNC: 138 MMOL/L (ref 136–145)
WBC # BLD AUTO: 5.39 K/UL (ref 3.9–12.7)

## 2023-05-04 PROCEDURE — 99233 PR SUBSEQUENT HOSPITAL CARE,LEVL III: ICD-10-PCS | Mod: GC,,, | Performed by: HOSPITALIST

## 2023-05-04 PROCEDURE — 36415 COLL VENOUS BLD VENIPUNCTURE: CPT

## 2023-05-04 PROCEDURE — 25000003 PHARM REV CODE 250

## 2023-05-04 PROCEDURE — 80048 BASIC METABOLIC PNL TOTAL CA: CPT | Mod: 91

## 2023-05-04 PROCEDURE — 99232 SBSQ HOSP IP/OBS MODERATE 35: CPT | Mod: ,,, | Performed by: INTERNAL MEDICINE

## 2023-05-04 PROCEDURE — 63600175 PHARM REV CODE 636 W HCPCS: Mod: HCNC | Performed by: STUDENT IN AN ORGANIZED HEALTH CARE EDUCATION/TRAINING PROGRAM

## 2023-05-04 PROCEDURE — 80048 BASIC METABOLIC PNL TOTAL CA: CPT

## 2023-05-04 PROCEDURE — 84100 ASSAY OF PHOSPHORUS: CPT | Performed by: STUDENT IN AN ORGANIZED HEALTH CARE EDUCATION/TRAINING PROGRAM

## 2023-05-04 PROCEDURE — 85025 COMPLETE CBC W/AUTO DIFF WBC: CPT | Performed by: STUDENT IN AN ORGANIZED HEALTH CARE EDUCATION/TRAINING PROGRAM

## 2023-05-04 PROCEDURE — 25000003 PHARM REV CODE 250: Mod: HCNC | Performed by: STUDENT IN AN ORGANIZED HEALTH CARE EDUCATION/TRAINING PROGRAM

## 2023-05-04 PROCEDURE — 99233 SBSQ HOSP IP/OBS HIGH 50: CPT | Mod: GC,,, | Performed by: HOSPITALIST

## 2023-05-04 PROCEDURE — 99232 PR SUBSEQUENT HOSPITAL CARE,LEVL II: ICD-10-PCS | Mod: ,,, | Performed by: INTERNAL MEDICINE

## 2023-05-04 PROCEDURE — 21400001 HC TELEMETRY ROOM

## 2023-05-04 PROCEDURE — 63600175 PHARM REV CODE 636 W HCPCS

## 2023-05-04 PROCEDURE — 97165 OT EVAL LOW COMPLEX 30 MIN: CPT

## 2023-05-04 PROCEDURE — 36415 COLL VENOUS BLD VENIPUNCTURE: CPT | Performed by: STUDENT IN AN ORGANIZED HEALTH CARE EDUCATION/TRAINING PROGRAM

## 2023-05-04 PROCEDURE — 83735 ASSAY OF MAGNESIUM: CPT | Performed by: STUDENT IN AN ORGANIZED HEALTH CARE EDUCATION/TRAINING PROGRAM

## 2023-05-04 PROCEDURE — 83735 ASSAY OF MAGNESIUM: CPT | Mod: 91

## 2023-05-04 PROCEDURE — 97535 SELF CARE MNGMENT TRAINING: CPT

## 2023-05-04 RX ORDER — AMITRIPTYLINE HYDROCHLORIDE 25 MG/1
50 TABLET, FILM COATED ORAL NIGHTLY
Status: DISCONTINUED | OUTPATIENT
Start: 2023-05-04 | End: 2023-05-25

## 2023-05-04 RX ORDER — MAGNESIUM SULFATE HEPTAHYDRATE 40 MG/ML
2 INJECTION, SOLUTION INTRAVENOUS ONCE
Status: COMPLETED | OUTPATIENT
Start: 2023-05-04 | End: 2023-05-04

## 2023-05-04 RX ADMIN — CLOPIDOGREL BISULFATE 75 MG: 75 TABLET ORAL at 08:05

## 2023-05-04 RX ADMIN — ASPIRIN 81 MG: 81 TABLET, COATED ORAL at 08:05

## 2023-05-04 RX ADMIN — SODIUM BICARBONATE 1300 MG: 650 TABLET ORAL at 09:05

## 2023-05-04 RX ADMIN — DOCUSATE SODIUM 100 MG: 100 CAPSULE, LIQUID FILLED ORAL at 09:05

## 2023-05-04 RX ADMIN — FUROSEMIDE 20 MG/HR: 10 INJECTION, SOLUTION INTRAMUSCULAR; INTRAVENOUS at 02:05

## 2023-05-04 RX ADMIN — ISOSORBIDE DINITRATE: 20 TABLET ORAL at 09:05

## 2023-05-04 RX ADMIN — MAGNESIUM SULFATE 2 G: 2 INJECTION INTRAVENOUS at 09:05

## 2023-05-04 RX ADMIN — SODIUM BICARBONATE 1300 MG: 650 TABLET ORAL at 04:05

## 2023-05-04 RX ADMIN — CARVEDILOL 3.12 MG: 3.12 TABLET, FILM COATED ORAL at 05:05

## 2023-05-04 RX ADMIN — CARVEDILOL 3.12 MG: 3.12 TABLET, FILM COATED ORAL at 08:05

## 2023-05-04 RX ADMIN — INSULIN DETEMIR 8 UNITS: 100 INJECTION, SOLUTION SUBCUTANEOUS at 09:05

## 2023-05-04 RX ADMIN — FUROSEMIDE 80 MG: 10 INJECTION, SOLUTION INTRAMUSCULAR; INTRAVENOUS at 05:05

## 2023-05-04 RX ADMIN — ISOSORBIDE DINITRATE: 20 TABLET ORAL at 04:05

## 2023-05-04 RX ADMIN — ATORVASTATIN CALCIUM 80 MG: 40 TABLET, FILM COATED ORAL at 08:05

## 2023-05-04 RX ADMIN — AMITRIPTYLINE HYDROCHLORIDE 50 MG: 25 TABLET, FILM COATED ORAL at 09:05

## 2023-05-04 RX ADMIN — SODIUM BICARBONATE 1300 MG: 650 TABLET ORAL at 08:05

## 2023-05-04 RX ADMIN — INSULIN ASPART 3 UNITS: 100 INJECTION, SOLUTION INTRAVENOUS; SUBCUTANEOUS at 04:05

## 2023-05-04 RX ADMIN — INSULIN ASPART 3 UNITS: 100 INJECTION, SOLUTION INTRAVENOUS; SUBCUTANEOUS at 08:05

## 2023-05-04 RX ADMIN — PANTOPRAZOLE SODIUM 40 MG: 40 TABLET, DELAYED RELEASE ORAL at 08:05

## 2023-05-04 RX ADMIN — ALLOPURINOL 100 MG: 100 TABLET ORAL at 08:05

## 2023-05-04 RX ADMIN — SERTRALINE HYDROCHLORIDE 50 MG: 50 TABLET ORAL at 08:05

## 2023-05-04 RX ADMIN — DOCUSATE SODIUM 100 MG: 100 CAPSULE, LIQUID FILLED ORAL at 08:05

## 2023-05-04 NOTE — ASSESSMENT & PLAN NOTE
History of CAD with retrosternal chest pain. EKG negative to ST changes. Nonspecific T wave inversions.     -Cardiac tele  -EKG prn  -Bidil per Cards recs

## 2023-05-04 NOTE — PLAN OF CARE
Problem: Adult Inpatient Plan of Care  Goal: Plan of Care Review  Outcome: Ongoing, Progressing  Goal: Optimal Comfort and Wellbeing  Outcome: Ongoing, Progressing     Problem: Fluid and Electrolyte Imbalance (Acute Kidney Injury/Impairment)  Goal: Fluid and Electrolyte Balance  Outcome: Ongoing, Progressing     Problem: Oral Intake Inadequate (Acute Kidney Injury/Impairment)  Goal: Optimal Nutrition Intake  Outcome: Ongoing, Progressing     Problem: Renal Function Impairment (Acute Kidney Injury/Impairment)  Goal: Effective Renal Function  Outcome: Ongoing, Progressing     Problem: Skin Injury Risk Increased  Goal: Skin Health and Integrity  Outcome: Ongoing, Progressing     Continue skin care. Reinforce safety precaution. Monitor I&O. LOFI to 1500ml/day as ordered.

## 2023-05-04 NOTE — ASSESSMENT & PLAN NOTE
Pt with increased Cr to 3.9 from 2.0 baseline. Possibly due to renovascular congestion from volume overload.     -Kerns  -Strict Is/Os  -Avoid nephrotoxic agents  -Lasix 20mg/hr  -Daily CMPs

## 2023-05-04 NOTE — SUBJECTIVE & OBJECTIVE
Interval History: Vitals reassuring and HDS.  1.2L UOP past 24 hours.  Started lasix gtt per Cards recs.    Review of Systems   Constitutional:  Negative for chills, fatigue and fever.   HENT:  Negative for congestion, dental problem, facial swelling, postnasal drip, rhinorrhea, sore throat and trouble swallowing.    Eyes:  Negative for photophobia and pain.   Respiratory:  Negative for cough, choking, chest tightness, shortness of breath and wheezing.    Cardiovascular:  Positive for leg swelling. Negative for chest pain.   Gastrointestinal:  Negative for abdominal distention, abdominal pain, diarrhea, nausea and vomiting.   Endocrine: Negative.    Genitourinary:  Negative for dysuria, flank pain, frequency and hematuria.   Musculoskeletal:  Negative for back pain.   Skin:  Negative for pallor and rash.   Allergic/Immunologic: Negative.    Neurological:  Negative for dizziness, syncope, weakness, numbness and headaches.   Psychiatric/Behavioral:  The patient is not nervous/anxious.    Objective:     Vital Signs (Most Recent):  Temp: 98 °F (36.7 °C) (05/04/23 1127)  Pulse: 89 (05/04/23 1228)  Resp: 18 (05/04/23 1127)  BP: 131/77 (05/04/23 1127)  SpO2: 96 % (05/04/23 1127) Vital Signs (24h Range):  Temp:  [97.8 °F (36.6 °C)-98.3 °F (36.8 °C)] 98 °F (36.7 °C)  Pulse:  [] 89  Resp:  [17-19] 18  SpO2:  [96 %-100 %] 96 %  BP: (120-163)/(63-93) 131/77     Weight: 95.3 kg (210 lb)  Body mass index is 34.95 kg/m².    Intake/Output Summary (Last 24 hours) at 5/4/2023 1320  Last data filed at 5/4/2023 0514  Gross per 24 hour   Intake 50 ml   Output 1250 ml   Net -1200 ml         Physical Exam  Vitals and nursing note reviewed.   Constitutional:       General: She is not in acute distress.     Appearance: Normal appearance. She is obese. She is not ill-appearing or diaphoretic.   HENT:      Head: Normocephalic and atraumatic.      Right Ear: External ear normal.      Left Ear: External ear normal.      Nose: Nose normal.       Mouth/Throat:      Mouth: Mucous membranes are moist.      Pharynx: Oropharynx is clear.   Eyes:      General: No scleral icterus.     Extraocular Movements: Extraocular movements intact.      Conjunctiva/sclera: Conjunctivae normal.      Pupils: Pupils are equal, round, and reactive to light.   Cardiovascular:      Rate and Rhythm: Normal rate and regular rhythm.      Pulses: Normal pulses.      Heart sounds: Normal heart sounds. No murmur heard.  Pulmonary:      Effort: Pulmonary effort is normal. No respiratory distress.      Breath sounds: Normal breath sounds. No stridor. No wheezing or rhonchi.   Chest:      Chest wall: No tenderness.   Abdominal:      General: Abdomen is flat. There is no distension.      Palpations: Abdomen is soft.      Tenderness: There is no abdominal tenderness. There is no guarding or rebound.   Musculoskeletal:         General: Swelling present. No tenderness. Normal range of motion.      Cervical back: Normal range of motion and neck supple. No rigidity or tenderness.      Right lower leg: Edema present.      Left lower leg: Edema present.      Comments: Trigger finger R middle finger   Skin:     General: Skin is warm and dry.      Capillary Refill: Capillary refill takes less than 2 seconds.      Coloration: Skin is not jaundiced.      Findings: No erythema.   Neurological:      General: No focal deficit present.      Mental Status: She is alert and oriented to person, place, and time.      Cranial Nerves: No cranial nerve deficit.      Motor: No weakness.   Psychiatric:         Mood and Affect: Mood normal.         Behavior: Behavior normal.           Significant Labs: All pertinent labs within the past 24 hours have been reviewed.  CBC:   Recent Labs   Lab 05/03/23  0248 05/04/23  0317   WBC 5.37 5.39   HGB 10.5* 10.4*   HCT 35.0* 34.6*    307     CMP:   Recent Labs   Lab 05/02/23  1924 05/03/23  0849 05/03/23 1952 05/04/23  0849    139 137 138   K 4.5 4.6 4.7  4.5    108 108 106   CO2 19* 19* 19* 19*   * 106 148* 109   BUN 56* 58* 57* 55*   CREATININE 3.8* 3.8* 3.7* 3.8*   CALCIUM 7.1* 7.2* 7.1* 7.5*   PROT 6.0 6.4 6.4  --    ALBUMIN 2.0* 2.2* 2.2*  --    BILITOT 0.3 0.4 0.3  --    ALKPHOS 218* 219* 237*  --    AST 27 22 26  --    ALT 36 31 30  --    ANIONGAP 10 12 10 13     Recent Lab Results  (Last 5 results in the past 24 hours)        05/04/23  1116   05/04/23  0849   05/04/23  0753   05/04/23  0317   05/03/23 1952        Albumin         2.2       Alkaline Phosphatase         237       ALT         30       Anion Gap   13       10       AST         26       Baso #       0.06         Basophil %       1.1         BILIRUBIN TOTAL         0.3  Comment: For infants and newborns, interpretation of results should be based  on gestational age, weight and in agreement with clinical  observations.    Premature Infant recommended reference ranges:  Up to 24 hours.............<8.0 mg/dL  Up to 48 hours............<12.0 mg/dL  3-5 days..................<15.0 mg/dL  6-29 days.................<15.0 mg/dL         BUN   55       57       Calcium   7.5       7.1       Chloride   106       108       CO2   19       19       Creatinine   3.8       3.7       Differential Method       Automated         eGFR   13.3       13.8       Eos #       0.1         Eosinophil %       2.6         Glucose   109       148       Gran # (ANC)       3.5         Gran %       64.9         Hematocrit       34.6         Hemoglobin       10.4         Immature Grans (Abs)       0.01  Comment: Mild elevation in immature granulocytes is non specific and   can be seen in a variety of conditions including stress response,   acute inflammation, trauma and pregnancy. Correlation with other   laboratory and clinical findings is essential.           Immature Granulocytes       0.2         Lymph #       1.1         Lymph %       20.4         Magnesium       1.8         MCH       30.4         MCHC       30.1          MCV       101         Mono #       0.6         Mono %       10.8         MPV       10.8         nRBC       0         Phosphorus       5.5         Platelets       307         POCT Glucose 86     115           Potassium   4.5       4.7       PROTEIN TOTAL         6.4       RBC       3.42         RDW       14.9         Sodium   138       137       WBC       5.39                                Significant Imaging: I have reviewed all pertinent imaging results/findings within the past 24 hours.

## 2023-05-04 NOTE — PROGRESS NOTES
[facility] ([address] Medicine  Progress Note    Patient Name: Suyapa Connelly  MRN: [id number]  Patient Class: IP- Inpatient   Admission Date: 5/1/2023  Length of Stay: 1 days  Attending Physician: Anna Lopez MD  Primary Care Provider: Magen Christensen MD        Subjective:     Principal Problem:Acute on chronic combined systolic and diastolic heart failure        HPI:  57 y.o. female with past medical history of CAD s/p CABG, CKD, DM, HTN, HLD, s/p bilateral AKA who presnets with chest pain, difficulty breathing, leg swelling.     Per ED She has been unable to access any of her medications for the last 3 months. Over the last few months she has noted worsening shortness of breath, particulatly orthopnea, and leg swelling. In the last two weeks she endorses associated abdominal pain and swelling, reduced appetite, and pleuritic chest pain with deep inspiration. She endorses occasional lighthededness with changes in position. Last week she had an episode where she was unable to breathe for about two minutes which was very upsetting for her and her , but she recovered and did not seek care at that time. Today, she complains of significant difficulty breathing, swelling in her legs and abdomen, and reduced appetite.     No headaches, visual changes, URI symptoms, palpitations, nausea, vomiting, diarrhea, blood in her stool, dysuria, hematuria, numbness or weakness in her extremities. She does endorse occasional paresthesias associated with her chronic phantom limb pain. She additionally has had increased difficulties with depression recently, denies suicidal or homicidal ideation, but has had considerable stress and low mood.      Of note, the patient saw a new primary care provider 4/21 for similar concerns who recommended she proceed to the ED at that time, but they were unable due to financial and geographic concerns. She lives two hours away from [facility] and her  cares  for her and elderly mother with dementia. She has had decreased access to medical care over the last year, and unable to access most of her prescriptions. She had a 90 day supply of four medications (lasix, statin, clopidogrel, sertraline) but these were accidentally lost and she has been unable to afford a replacement. They present today for evaluation and to reestablish care and access to her prescriptions.     Labs on admission significant for an elevated BNP, hypocalcemia which corrected is 8.3, significant elevation in Creatinine and a slight elevation of troponin    CXR  Cardiomegaly with pulmonary interstitial edema, suggestive of pulmonary edema secondary to CHF.      Overview/Hospital Course:  Diuresis initiated with Lasix IV.  1.2L UOP in the past 24 hours.  Vitals reassuring with adequate sats on ambient air.  Still very edematious; Cardiology recs lasix gtt.  GDMT initiated.      Interval History: Vitals reassuring and HDS.  1.2L UOP past 24 hours.  Started lasix gtt per Cards recs.    Review of Systems   Constitutional:  Negative for chills, fatigue and fever.   HENT:  Negative for congestion, dental problem, facial swelling, postnasal drip, rhinorrhea, sore throat and trouble swallowing.    Eyes:  Negative for photophobia and pain.   Respiratory:  Negative for cough, choking, chest tightness, shortness of breath and wheezing.    Cardiovascular:  Positive for leg swelling. Negative for chest pain.   Gastrointestinal:  Negative for abdominal distention, abdominal pain, diarrhea, nausea and vomiting.   Endocrine: Negative.    Genitourinary:  Negative for dysuria, flank pain, frequency and hematuria.   Musculoskeletal:  Negative for back pain.   Skin:  Negative for pallor and rash.   Allergic/Immunologic: Negative.    Neurological:  Negative for dizziness, syncope, weakness, numbness and headaches.   Psychiatric/Behavioral:  The patient is not nervous/anxious.    Objective:     Vital Signs (Most  Recent):  Temp: 98 °F (36.7 °C) (05/04/23 1127)  Pulse: 89 (05/04/23 1228)  Resp: 18 (05/04/23 1127)  BP: 131/77 (05/04/23 1127)  SpO2: 96 % (05/04/23 1127) Vital Signs (24h Range):  Temp:  [97.8 °F (36.6 °C)-98.3 °F (36.8 °C)] 98 °F (36.7 °C)  Pulse:  [] 89  Resp:  [17-19] 18  SpO2:  [96 %-100 %] 96 %  BP: (120-163)/(63-93) 131/77     Weight: 95.3 kg (210 lb)  Body mass index is 34.95 kg/m².    Intake/Output Summary (Last 24 hours) at 5/4/2023 1320  Last data filed at 5/4/2023 0514  Gross per 24 hour   Intake 50 ml   Output 1250 ml   Net -1200 ml         Physical Exam  Vitals and nursing note reviewed.   Constitutional:       General: She is not in acute distress.     Appearance: Normal appearance. She is obese. She is not ill-appearing or diaphoretic.   HENT:      Head: Normocephalic and atraumatic.      Right Ear: External ear normal.      Left Ear: External ear normal.      Nose: Nose normal.      Mouth/Throat:      Mouth: Mucous membranes are moist.      Pharynx: Oropharynx is clear.   Eyes:      General: No scleral icterus.     Extraocular Movements: Extraocular movements intact.      Conjunctiva/sclera: Conjunctivae normal.      Pupils: Pupils are equal, round, and reactive to light.   Cardiovascular:      Rate and Rhythm: Normal rate and regular rhythm.      Pulses: Normal pulses.      Heart sounds: Normal heart sounds. No murmur heard.  Pulmonary:      Effort: Pulmonary effort is normal. No respiratory distress.      Breath sounds: Normal breath sounds. No stridor. No wheezing or rhonchi.   Chest:      Chest wall: No tenderness.   Abdominal:      General: Abdomen is flat. There is no distension.      Palpations: Abdomen is soft.      Tenderness: There is no abdominal tenderness. There is no guarding or rebound.   Musculoskeletal:         General: Swelling present. No tenderness. Normal range of motion.      Cervical back: Normal range of motion and neck supple. No rigidity or tenderness.      Right  lower leg: Edema present.      Left lower leg: Edema present.      Comments: Trigger finger R middle finger   Skin:     General: Skin is warm and dry.      Capillary Refill: Capillary refill takes less than 2 seconds.      Coloration: Skin is not jaundiced.      Findings: No erythema.   Neurological:      General: No focal deficit present.      Mental Status: She is alert and oriented to person, place, and time.      Cranial Nerves: No cranial nerve deficit.      Motor: No weakness.   Psychiatric:         Mood and Affect: Mood normal.         Behavior: Behavior normal.           Significant Labs: All pertinent labs within the past 24 hours have been reviewed.  CBC:   Recent Labs   Lab 05/03/23  0248 05/04/23  0317   WBC 5.37 5.39   HGB 10.5* 10.4*   HCT 35.0* 34.6*    307     CMP:   Recent Labs   Lab 05/02/23  1924 05/03/23  0849 05/03/23 1952 05/04/23  0849    139 137 138   K 4.5 4.6 4.7 4.5    108 108 106   CO2 19* 19* 19* 19*   * 106 148* 109   BUN 56* 58* 57* 55*   CREATININE 3.8* 3.8* 3.7* 3.8*   CALCIUM 7.1* 7.2* 7.1* 7.5*   PROT 6.0 6.4 6.4  --    ALBUMIN 2.0* 2.2* 2.2*  --    BILITOT 0.3 0.4 0.3  --    ALKPHOS 218* 219* 237*  --    AST 27 22 26  --    ALT 36 31 30  --    ANIONGAP 10 12 10 13     Recent Lab Results  (Last 5 results in the past 24 hours)        05/04/23  1116   05/04/23  0849   05/04/23  0753   05/04/23  0317   05/03/23 1952        Albumin         2.2       Alkaline Phosphatase         237       ALT         30       Anion Gap   13       10       AST         26       Baso #       0.06         Basophil %       1.1         BILIRUBIN TOTAL         0.3  Comment: For infants and newborns, interpretation of results should be based  on gestational age, weight and in agreement with clinical  observations.    Premature Infant recommended reference ranges:  Up to 24 hours.............<8.0 mg/dL  Up to 48 hours............<12.0 mg/dL  3-5 days..................<15.0 mg/dL  6-29  days.................<15.0 mg/dL         BUN   55       57       Calcium   7.5       7.1       Chloride   106       108       CO2   19       19       Creatinine   3.8       3.7       Differential Method       Automated         eGFR   13.3       13.8       Eos #       0.1         Eosinophil %       2.6         Glucose   109       148       Gran # (ANC)       3.5         Gran %       64.9         Hematocrit       34.6         Hemoglobin       10.4         Immature Grans (Abs)       0.01  Comment: Mild elevation in immature granulocytes is non specific and   can be seen in a variety of conditions including stress response,   acute inflammation, trauma and pregnancy. Correlation with other   laboratory and clinical findings is essential.           Immature Granulocytes       0.2         Lymph #       1.1         Lymph %       20.4         Magnesium       1.8         MCH       30.4         MCHC       30.1         MCV       101         Mono #       0.6         Mono %       10.8         MPV       10.8         nRBC       0         Phosphorus       5.5         Platelets       307         POCT Glucose 86     115           Potassium   4.5       4.7       PROTEIN TOTAL         6.4       RBC       3.42         RDW       14.9         Sodium   138       137       WBC       5.39                                Significant Imaging: I have reviewed all pertinent imaging results/findings within the past 24 hours.      Assessment/Plan:      * Acute on chronic combined systolic and diastolic heart failure  Last Echo 6/21 showing     The estimated ejection fraction is 55%.   The left ventricle is normal in size with normal systolic function.   Normal left ventricular diastolic function.   Normal right ventricular size with normal right ventricular systolic function.   Mild tricuspid regurgitation.   The estimated PA systolic pressure is 31 mmHg.   Normal central venous pressure (3 mmHg).    Echo    Result Date: 5/2/2023  · The left  ventricle is mildly enlarged with severely decreased systolic   function. The estimated ejection fraction is 15%.  · There is severe left ventricular global hypokinesis.  · Normal right ventricular size with low normal right ventricular systolic   function.  · Grade I left ventricular diastolic dysfunction.  · Biatrial enlargement.  · Mild-to-moderate mitral regurgitation.  · Severe tricuspid regurgitation.  · The estimated PA systolic pressure is 43 mmHg.  · Elevated central venous pressure (15 mmHg).           -IV Diuresis  -Qq4 BMPs with electrolyte repletion as needed; goal K>4, Mg>2  -Cardiology consult for acute decompensated heart failure with reduced EF   -Transition from PO to IV Lasix 80mg TID for daily goal net negative 2-3L  - Inpatient vs outpatient stress testing once patient euvolemic  - Strict I/Os with daily weights  - 1.5L fluid restriction, low Na diet  - recommend starting GDMT when patient euvolemic and renal function improves        ROSLYN (acute kidney injury)  Pt with increased Cr to 3.9 from 2.0 baseline. Possibly due to renovascular congestion from volume overload.     -Kerns  -Strict Is/Os  -Avoid nephrotoxic agents  -Lasix 20mg/hr  -Daily CMPs        Acute hypoxemic respiratory failure  See Acute on chronic CHF    CKD (chronic kidney disease), stage IV  History of CKD. See ROSLYN.      Above-knee amputation of right lower extremity  Prior history consistent with exam      Uses self-applied continuous glucose monitoring device  POCT glucose and daily CMPs    -Restart insulin for hyperglycemia      Dermatitis associated with moisture  Large body habitus with dermatitis to folds. Will monitor for infection.    -Zinc based powder  -Topical abx if indicated      Status post above knee amputation, left  History of.      Hypoalbuminemia  Pt with poor PO intake reported. Pt eats one meal a day and has associated nausea and vomiting. Will try antiemetics before feeds to improve intake. Will monitor  daily intake and calorie replacement.     -Calorie count  -Diabetic/Renal diet  -Antiemetics before meals; Zofran 4 IV PRN  -Monitor Qtc for prolongation with antiemetics      Impaired functional mobility and endurance  PT/OT for bedbound patient      Paroxysmal atrial fibrillation  Continue home rate control  NSR on EKG        Anemia  Asymptomatic. Higher than 8 months ago. No signs of acute bleed at this time. Haptoglobin elevated.    -CBC daily  -Transfuse if <7        S/P CABG x 1  History of. Sternal scar well healed.       CAD (coronary artery disease)  History of CAD with retrosternal chest pain. EKG negative to ST changes. Nonspecific T wave inversions.     -Cardiac tele  -EKG prn  -Bidil per Cards recs      CAROLIN (generalized anxiety disorder)  Continue home anxiety medications      Essential hypertension  Continue home antihypertensives      PAD (peripheral artery disease)  History of bilateral AKA.           VTE Risk Mitigation (From admission, onward)    None          Discharge Planning   DEVAN: 5/8/2023     Code Status: Full Code   Is the patient medically ready for discharge?: No    Reason for patient still in hospital (select all that apply): Patient trending condition, Laboratory test, Treatment and PT / OT recommendations  Discharge Plan A: Home Health   Discharge Delays: None known at this time              Roosevelt Lazcano MD  Department of Hospital Medicine   [facility] - Intensive Care ([id number])

## 2023-05-04 NOTE — PT/OT/SLP EVAL
"Occupational Therapy   Evaluation and tx    Name: Suyapa Connelly  MRN: [id number]  Admitting Diagnosis: Acute on chronic combined systolic and diastolic heart failure  Recent Surgery: * No surgery found *      Recommendations:     Discharge Recommendations: other (see comments) (TBD)  Discharge Equipment Recommendations:  drop arm commode, hospital bed, lift device, bath bench  Barriers to discharge:  Inaccessible home environment, Other (Comment) (Pt will be returning into new home so modifications may not yet be made.)    Assessment:     Suyapa Connelly is a 57 y.o. female with a medical diagnosis of Acute on chronic combined systolic and diastolic heart failure.  She presents with complaints of heaviness in BLE's secondary to edema, discomfort in vaginal area due to rubbing of Pure Wick pad and SOB with accompanying pain over heart area in chest. Performance deficits affecting function: weakness, impaired endurance, decreased upper extremity function, decreased lower extremity function, edema, pain.      Rehab Prognosis: Fair; patient would benefit from acute skilled OT services to address these deficits and reach maximum level of function.       Plan:     Patient to be seen 4 x/week to address the above listed problems via self-care/home management, therapeutic activities, therapeutic exercises  Plan of Care Expires: 05/25/23  Plan of Care Reviewed with: patient    Subjective   " I have a lot of fluid on my legs and I'm short of breath"  Chief Complaint: See above  Patient/Family Comments/goals: To increase her independence to decrease burden on her .    Occupational Profile:  Living Environment: Pt lives with her  and MIL in a 1 Story house with small threshold to enter. Walk in shower, no grab bars.  Previous level of function: PTA, pt was I with toileting, and needed A with sponge bathing.  Roles and Routines: Retired after heart attack  Equipment Used at Home: wheelchair, bedside " commode  Assistance upon Discharge: TBD    Pain/Comfort:  Pain Rating 1: 8/10  Location - Side 1: Bilateral (LE's)  Location - Orientation 1: generalized  Location 1: leg  Pain Addressed 1: Distraction, Cessation of Activity  Pain Rating Post-Intervention 1: 8/10  Location - Side 2: Bilateral  Location - Orientation 2: generalized  Location 2: leg  Pain Addressed 2: Cessation of Activity    Patients cultural, spiritual, [employer]alism conflicts given the current situation: no    Objective:     Communicated with: nurse prior to session.  Patient found with bed in chair position with peripheral IV, PureWick upon OT entry to room.    General Precautions: Standard, fall  Orthopedic Precautions: N/A  Braces: N/A  Respiratory Status: Room air    Occupational Performance:    Bed Mobility:    Patient completed Rolling/Turning to Left with  moderate assistance  Patient completed Rolling/Turning to Right with moderate assistance  Patient completed Scooting/Bridging with moderate assistance  Patient completed Supine to Sit with stand by assistance  Patient completed Sit to Supine with stand by assistance    Functional Mobility/Transfers:  Pt max A supine> EOB  Functional Mobility: Unable to assess    Activities of Daily Living:  Feeding:  modified independence bedside tray  Grooming: modified independence bed level or EOB  Bathing: maximal assistance to reach all areas to sponge bathe EOB  Upper Body Dressing: moderate assistance don/doff shirt secondary to limited strength in R hand.  Lower Body Dressing: maximal assistance    Toileting: pt using Pure Wick needs Max A to place in proper place when being changed.    Cognitive/Visual Perceptual:  Cognitive/Psychosocial Skills:     -       Oriented to: Person, Place, Time, and Situation   -       Follows Commands/attention:Follows multistep  commands  -       Communication: clear/fluent  -       Memory: No Deficits noted  -       Safety awareness/insight to disability: intact   -        Mood/Affect/Coping skills/emotional control: Cooperative  Visual/Perceptual:      -Intact All WNL    Physical Exam:  Balance: -       static sit WFL, too painful to assess dynamic sit  Postural examination/scapula alignment:    -       No postural abnormalities identified  Skin integrity: Visible skin intact  Edema:  Severe BLE's  Sensation:    -       Intact  Motor Planning: -       WFL  Dominant hand: -       R  Upper Extremity Range of Motion:     -       Right Upper Extremity: WFL  -       Left Upper Extremity: WFL  Upper Extremity Strength:    -       Right Upper Extremity: 3/5  -       Left Upper Extremity: WFL   Strength:    -       Right Upper Extremity: Deficits: pt has contracted digits which can be extended manually but are not functional for , grasp or Fine motor function.  -       Left Upper Extremity: WFL  Fine Motor Coordination:    -       Impaired  Right hand thumb/finger opposition skills  , Right hand, manipulation of objects  , Right hand, graphomotor skills  , and Right hand, diadochokinesis skill    Gross motor coordination:   WFL ion L side  Neurological: -       WNL    AMPAC 6 Click ADL:  AMPAC Total Score: 14    Treatment & Education:  Role of OT and POC  Safety  ADL retraining  Functional mobility training  Discharge planning  Importance of EOB/OOB activity      Patient left HOB elevated with all lines intact, call button in reach, and nurse notified    GOALS:   Multidisciplinary Problems       Occupational Therapy Goals       Not on file                    History:     Past Medical History:   Diagnosis Date    Anxiety     Chronic pain syndrome     CKD (chronic kidney disease), stage III     Depression     Diabetes mellitus     type 2    Diabetes mellitus, type 2     GERD (gastroesophageal reflux disease)     Hyperemesis 3/23/2021    Hyperlipemia     Hypertension     Hypokalemia 3/23/2021    Infection of below knee amputation stump 3/12/2022    Myocardial infarction 2010     minor-caused by stress per pt.    Osteomyelitis     Osteomyelitis of left foot 4/30/2021    PVD (peripheral vascular disease)     Ulcer of left foot     Vaginal delivery     x1         Past Surgical History:   Procedure Laterality Date    ABOVE-KNEE AMPUTATION Left 5/18/2021    Procedure: AMPUTATION, ABOVE KNEE;  Surgeon: Teddy Huber MD;  Location: [address];  Service: Vascular;  Laterality: Left;    ABOVE-KNEE AMPUTATION Right 3/18/2022    Procedure: AMPUTATION, ABOVE KNEE;  Surgeon: DAYNE Florez II, MD;  Location: [address];  Service: Vascular;  Laterality: Right;    Angiogram - Right Extremity Right 7/9/15    angiogram-left leg  10/6/15    ANGIOGRAPHY OF LOWER EXTREMITY Left 4/29/2021    Procedure: ANGIOGRAM, LOWER EXTREMITY;  Surgeon: Teddy Huber MD;  Location: [address];  Service: Vascular;  Laterality: Left;    BELOW KNEE AMPUTATION OF LOWER EXTREMITY Right 12/28/2021    Procedure: AMPUTATION, BELOW KNEE;  Surgeon: Kaitlyn Rojas MD;  Location: [facility];  Service: General;  Laterality: Right;    CATHETERIZATION OF BOTH LEFT AND RIGHT HEART N/A 12/18/2019    Procedure: CATHETERIZATION, HEART, BOTH LEFT AND RIGHT;  Surgeon: Que Fernando III, MD;  Location: [facility] CATH LAB;  Service: Cardiology;  Laterality: N/A;    CORONARY ANGIOGRAPHY N/A 12/18/2019    Procedure: ANGIOGRAM, CORONARY ARTERY;  Surgeon: Que Fernando III, MD;  Location: [facility] CATH LAB;  Service: Cardiology;  Laterality: N/A;    CORONARY ANGIOGRAPHY INCLUDING BYPASS GRAFTS WITH CATHETERIZATION OF LEFT HEART N/A 7/28/2020    Procedure: ANGIOGRAM, CORONARY, INCLUDING BYPASS GRAFT, WITH LEFT HEART CATHETERIZATION, 9 am;  Surgeon: Rachel Easley MD;  Location: [facility] CATH LAB;  Service: Cardiology;  Laterality: N/A;    CORONARY ARTERY BYPASS GRAFT (CABG) N/A 1/14/2020    Procedure: CORONARY ARTERY BYPASS GRAFT (CABG) x 1     Off Pump;  Surgeon: Huang Altamirano MD;  Location: [address];   Service: Cardiovascular;  Laterality: N/A;    CREATION OF FEMORAL-TIBIAL ARTERY BYPASS Left 4/29/2021    Procedure: CREATION, BYPASS, ARTERIAL, FEMORAL TO ANTERIOR TIBIAL;  Surgeon: Teddy Huber MD;  Location: [facility] OR [address] [place] [address];  Service: Vascular;  Laterality: Left;    CREATION OF FEMOROPOPLITEAL ARTERIAL BYPASS USING GRAFT Left 8/18/2020    Procedure: CREATION, BYPASS, ARTERIAL, FEMORAL TO POPLITEAL, USING GRAFT, LEFT LOWER EXTREMITY;  Surgeon: Teddy Huber MD;  Location: [facility] OR;  Service: Vascular;  Laterality: Left;  REQUEST 7:15 A.M. START----COVID NEGATIVE ON 8/17  1ST CASE STARTE PER LEANA ON 8/7/2020 @ 942AM-LO  RN PREOP 8/12/2020   T/S-----CLEARED BY CARDS-------PENDING INSURANCE    DEBRIDEMENT OF FOOT Left 9/8/2020    Procedure: DEBRIDEMENT, FOOT;  Surgeon: Rosio Mayes DPM;  Location: [facility] OR;  Service: Podiatry;  Laterality: Left;  request neoxx .   RN Pre Op 9-4-2020, Covid negative on 9/5/20. C A    DEBRIDEMENT OF FOOT  3/4/2021    Procedure: DEBRIDEMENT, FOOT;  Surgeon: Teddy Huber MD;  Location: [facility] OR;  Service: Vascular;;    DEBRIDEMENT OF FOOT Left 3/9/2021    Procedure: DEBRIDEMENT, FOOT, bone biopsy;  Surgeon: Rosio Mayes DPM;  Location: [facility] OR;  Service: Podiatry;  Laterality: Left;  Request neoxx---COVID IN AM  REQUESTING NOON START  RN Phone Pre op.On Blood thinners Plavix and Eliquis.  Covid am of surgery. C A    DEBRIDEMENT OF FOOT Left 5/4/2021    Procedure: DEBRIDEMENT, FOOT;  Surgeon: Farooq Morley DPM;  [facility];  Service: Podiatry;  Laterality: Left;    INSERTION OF TUNNELED CENTRAL VENOUS HEMODIALYSIS CATHETER N/A 1/27/2020    Procedure: Insertion, Catheter, Central Venous, Hemodialysis;  Surgeon: ESTEBAN Gomez III, MD;  Location: [facility] CATH LAB;  Service: Peripheral Vascular;  Laterality: N/A;    PERCUTANEOUS TRANSLUMINAL ANGIOPLASTY N/A 3/4/2021    Procedure: PTA (ANGIOPLASTY, PERCUTANEOUS, TRANSLUMINAL);  Surgeon: Teddy  RIANNA Huber MD;  Location: [facility] OR;  Service: Vascular;  Laterality: N/A;    REMOVAL OF ARTERIOVENOUS GRAFT Left 5/27/2021    Procedure: REMOVAL, GRAFT, LEFT LOWER EXTREMITY, WOUND EXPLORATION;  Surgeon: Teddy Huber MD;  Location: [facility] OR 2ND FLR;  Service: Vascular;  Laterality: Left;    REMOVAL OF NAIL OF DIGIT Left 3/9/2021    Procedure: REMOVAL, NAIL, DIGIT;  Surgeon: Rosio Mayes DPM;  Location: [facility] OR;  Service: Podiatry;  Laterality: Left;    THROMBECTOMY Left 3/4/2021    Procedure: THROMBECTOMY, LEFT LOWER EXTREMITY BYPASS GRAFT, ANGIOGRAM, POSSIBLE INTERVENTION, POSSIBLE LEFT LOWER EXTREMITY BYPASS;  Surgeon: Teddy Huber MD;  Location: [facility] OR;  Service: Vascular;  Laterality: Left;    THROMBECTOMY Left 4/29/2021    Procedure: GRAFT THROMBECTOMY, LEFT LOWER EXTREMITY;  Surgeon: Teddy Huber MD;  [facility];  Service: Vascular;  Laterality: Left;  14.5 min  1179.85 mGy  341.01 Gycm2  240 ml dye    THROMBECTOMY  10/22/2021    Procedure: THROMBECTOMY;  Surgeon: Saad Arenas MD;  Location: [facility] CATH LAB/EP;  Service: Cardiology;;       Time Tracking:     OT Date of Treatment: 05/04/23  OT Start Time: 1348  OT Stop Time: 1427  OT Total Time (min): 39 min    Billable Minutes:Evaluation 22  Self Care/Home Management 17    5/4/2023

## 2023-05-04 NOTE — PROGRESS NOTES
[facility] ([place] [address])  Cardiology  Progress Note    Patient Name: Suyapa Connelly  MRN: [id number]  Admission Date: 5/1/2023  Hospital Length of Stay: 1 days  Code Status: Full Code   Attending Physician: Anna Lopez MD   Primary Care Physician: Magen Christensen MD  Expected Discharge Date: 5/8/2023  Principal Problem:Acute on chronic combined systolic and diastolic heart failure    Subjective:     Interval History: No acute events overnight. Remains HDS. On lasix 80mg IV TID, net negative 1.2L. Continuing to have dyspnea, orthopnea, and LE edema bilaterally.  Patient states feeling uncomfortable last night, unable to sleep 2/2 dyspnea.  Denies palpitations, lightheadedness, dizziness, cough.  On room air.    Review of Systems   Cardiovascular:  Positive for chest pain, dyspnea on exertion and orthopnea. Negative for palpitations and syncope.   Respiratory:  Positive for shortness of breath. Negative for cough, hemoptysis, sputum production and wheezing.    Objective:     Vital Signs (Most Recent):  Temp: 98.3 °F (36.8 °C) (05/04/23 0752)  Pulse: 94 (05/04/23 0752)  Resp: 18 (05/04/23 0752)  BP: (!) 136/93 (05/04/23 0752)  SpO2: 100 % (05/04/23 0752)   Vital Signs (24h Range):  Temp:  [97.8 °F (36.6 °C)-98.3 °F (36.8 °C)] 98.3 °F (36.8 °C)  Pulse:  [] 94  Resp:  [17-19] 18  SpO2:  [96 %-100 %] 100 %  BP: (120-163)/(63-93) 136/93     Weight: 95.3 kg (210 lb)  Body mass index is 34.95 kg/m².     SpO2: 100 %         Intake/Output Summary (Last 24 hours) at 5/4/2023 0944  Last data filed at 5/4/2023 0514  Gross per 24 hour   Intake 50 ml   Output 1250 ml   Net -1200 ml         Lines/Drains/Airways       Drain  Duration             Female External Urinary Catheter 05/03/23 0805 1 day              Peripheral Intravenous Line  Duration                  External Jugular IV 08/13/22 1735 263 days         Peripheral IV - Single Lumen 05/01/23 1604 20 G Posterior;Right Hand 2 days                     Physical Exam  Vitals and nursing note reviewed.   Constitutional:       General: She is not in acute distress.     Appearance: She is obese. She is not ill-appearing or toxic-appearing.   HENT:      Head: Normocephalic and atraumatic.      Mouth/Throat:      Mouth: Mucous membranes are moist.      Pharynx: No oropharyngeal exudate.   Eyes:      Extraocular Movements: Extraocular movements intact.      Conjunctiva/sclera: Conjunctivae normal.   Neck:      Vascular: JVD present.   Cardiovascular:      Rate and Rhythm: Normal rate and regular rhythm.   Pulmonary:      Effort: Pulmonary effort is normal. No respiratory distress.   Abdominal:      General: Bowel sounds are normal. There is distension.      Palpations: Abdomen is soft.      Tenderness: There is no abdominal tenderness. There is no guarding.   Musculoskeletal:      Cervical back: Normal range of motion and neck supple.      Right lower leg: Edema present.      Left lower leg: Edema present.      Comments: Patient with bilateral AKA. Edema of thighs   Skin:     General: Skin is warm.      Coloration: Skin is not jaundiced.      Findings: No bruising.   Neurological:      General: No focal deficit present.      Mental Status: She is alert and oriented to person, place, and time. Mental status is at baseline.       Significant Labs: CMP   Recent Labs   Lab 05/02/23  1924 05/03/23  0849 05/03/23  1952 05/04/23  0849    139 137 138   K 4.5 4.6 4.7 4.5    108 108 106   CO2 19* 19* 19* 19*   * 106 148* 109   BUN 56* 58* 57* 55*   CREATININE 3.8* 3.8* 3.7* 3.8*   CALCIUM 7.1* 7.2* 7.1* 7.5*   PROT 6.0 6.4 6.4  --    ALBUMIN 2.0* 2.2* 2.2*  --    BILITOT 0.3 0.4 0.3  --    ALKPHOS 218* 219* 237*  --    AST 27 22 26  --    ALT 36 31 30  --    ANIONGAP 10 12 10 13     , CBC   Recent Labs   Lab 05/03/23  0248 05/04/23  0317   WBC 5.37 5.39   HGB 10.5* 10.4*   HCT 35.0* 34.6*    307     , and All pertinent lab results from the last 24  "hours have been reviewed.    Significant Imaging: Echocardiogram: Transthoracic echo (TTE) complete ([facility] Only):   Results for orders placed or performed during the hospital encounter of 05/01/23   Echo   Result Value Ref Range    Ascending aorta 3.34 cm    STJ 2.58 cm    AV mean gradient 3 mmHg    Ao peak ty 1.21 m/s    Ao VTI 24.23 cm    IVS 0.89 0.6 - 1.1 cm    LA size 4.33 cm    Left Atrium Major Axis 6.50 cm    Left Atrium Minor Axis 5.59 cm    LVIDd 5.43 3.5 - 6.0 cm    LVIDs 4.79 (A) 2.1 - 4.0 cm    LVOT diameter 1.98 cm    LVOT peak VTI 13.63 cm    Posterior Wall 0.76 0.6 - 1.1 cm    MV Peak A Ty 0.49 m/s    E wave deceleration time 131.42 msec    MV Peak E Ty 0.64 m/s    PV Peak D Ty 0.34 m/s    PV Peak S Ty 0.27 m/s    RA Major Axis 5.14 cm    RA Width 3.91 cm    RVDD 4.13 cm    Sinus 3.01 cm    TAPSE 1.31 cm    TR Max Ty 2.65 m/s    TDI LATERAL 0.06 m/s    TDI SEPTAL 0.05 m/s    LA WIDTH 4.60 cm    MV stenosis pressure 1/2 time 38.11 ms    LV Diastolic Volume 143.29 mL    LV Systolic Volume 107.10 mL    RV S' 5.92 cm/s    LVOT peak ty 0.77 m/s    LA volume (mod) 72.01 cm3    MV "A" wave duration 16.56 msec    LV LATERAL E/E' RATIO 10.67 m/s    LV SEPTAL E/E' RATIO 12.80 m/s    FS 12 %    LA volume 101.76 cm3    LV mass 162.67 g    Left Ventricle Relative Wall Thickness 0.28 cm    AV valve area 1.73 cm2    AV Velocity Ratio 0.64     AV index (prosthetic) 0.56     MV valve area p 1/2 method 5.77 cm2    E/A ratio 1.31     Mean e' 0.06 m/s    Pulm vein S/D ratio 0.79     LVOT area 3.1 cm2    LVOT stroke volume 41.95 cm3    AV peak gradient 6 mmHg    E/E' ratio 11.64 m/s    LV Systolic Volume Index 53.0 mL/m2    LV Diastolic Volume Index 70.94 mL/m2    LA Volume Index 50.4 mL/m2    LV Mass Index 81 g/m2    Triscuspid Valve Regurgitation Peak Gradient 28 mmHg    LA Volume Index (Mod) 35.6 mL/m2    BSA 2.09 m2    Right Atrial Pressure (from IVC) 15 mmHg    EF 15 %    TV rest pulmonary artery pressure 43 " mmHg    Narrative    · The left ventricle is mildly enlarged with severely decreased systolic   function. The estimated ejection fraction is 15%.  · There is severe left ventricular global hypokinesis.  · Normal right ventricular size with low normal right ventricular systolic   function.  · Grade I left ventricular diastolic dysfunction.  · Biatrial enlargement.  · Mild-to-moderate mitral regurgitation.  · Severe tricuspid regurgitation.  · The estimated PA systolic pressure is 43 mmHg.  · Elevated central venous pressure (15 mmHg).        Assessment and Plan:       * Acute on chronic combined systolic and diastolic heart failure  Patient with significant cardiovascular history presenting for ADHF with newly depressed EF  Patient not on GDMT  Takes lasix 40mg daily    TTE 15% (previously 55%), biatrial enlargement, CVP 15, PASP 43, severe tricuspid regurgitation, not noted on prior in 2021  .2, HgA1c 6.2    Recommendations:  - Transition to Lasix ggt 20mg/hr for daily goal net negative 2-3L  - Start Bidil TID to improve BP control and decrease afterload  - BMP BID to assess for close electrolyte monitoring, goal K>4, Mg>2  - Inpatient vs outpatient stress testing once patient euvolemic  - Strict I/Os with daily weights  - 1.5L fluid restriction, low Na diet  - recommend starting GDMT when patient euvolemic and renal function improves      VTE Risk Mitigation (From admission, onward)      None            Omar Miller MD  Cardiology  [facility] - [facility] ([place])

## 2023-05-04 NOTE — SUBJECTIVE & OBJECTIVE
Interval History: No acute events overnight. Remains HDS. On lasix 80mg IV TID, net negative 1.2L. Continuing to have dyspnea, orthopnea, and LE edema bilaterally.  Patient states feeling uncomfortable last night, unable to sleep 2/2 dyspnea.  Denies palpitations, lightheadedness, dizziness, cough.  On room air.    Review of Systems   Cardiovascular:  Positive for chest pain, dyspnea on exertion and orthopnea. Negative for palpitations and syncope.   Respiratory:  Positive for shortness of breath. Negative for cough, hemoptysis, sputum production and wheezing.    Objective:     Vital Signs (Most Recent):  Temp: 98.3 °F (36.8 °C) (05/04/23 0752)  Pulse: 94 (05/04/23 0752)  Resp: 18 (05/04/23 0752)  BP: (!) 136/93 (05/04/23 0752)  SpO2: 100 % (05/04/23 0752)   Vital Signs (24h Range):  Temp:  [97.8 °F (36.6 °C)-98.3 °F (36.8 °C)] 98.3 °F (36.8 °C)  Pulse:  [] 94  Resp:  [17-19] 18  SpO2:  [96 %-100 %] 100 %  BP: (120-163)/(63-93) 136/93     Weight: 95.3 kg (210 lb)  Body mass index is 34.95 kg/m².     SpO2: 100 %         Intake/Output Summary (Last 24 hours) at 5/4/2023 0944  Last data filed at 5/4/2023 0514  Gross per 24 hour   Intake 50 ml   Output 1250 ml   Net -1200 ml         Lines/Drains/Airways       Drain  Duration             Female External Urinary Catheter 05/03/23 0805 1 day              Peripheral Intravenous Line  Duration                  External Jugular IV 08/13/22 1735 263 days         Peripheral IV - Single Lumen 05/01/23 1604 20 G Posterior;Right Hand 2 days                    Physical Exam  Vitals and nursing note reviewed.   Constitutional:       General: She is not in acute distress.     Appearance: She is obese. She is not ill-appearing or toxic-appearing.   HENT:      Head: Normocephalic and atraumatic.      Mouth/Throat:      Mouth: Mucous membranes are moist.      Pharynx: No oropharyngeal exudate.   Eyes:      Extraocular Movements: Extraocular movements intact.       Conjunctiva/sclera: Conjunctivae normal.   Neck:      Vascular: JVD present.   Cardiovascular:      Rate and Rhythm: Normal rate and regular rhythm.   Pulmonary:      Effort: Pulmonary effort is normal. No respiratory distress.   Abdominal:      General: Bowel sounds are normal. There is distension.      Palpations: Abdomen is soft.      Tenderness: There is no abdominal tenderness. There is no guarding.   Musculoskeletal:      Cervical back: Normal range of motion and neck supple.      Right lower leg: Edema present.      Left lower leg: Edema present.      Comments: Patient with bilateral AKA. Edema of thighs   Skin:     General: Skin is warm.      Coloration: Skin is not jaundiced.      Findings: No bruising.   Neurological:      General: No focal deficit present.      Mental Status: She is alert and oriented to person, place, and time. Mental status is at baseline.       Significant Labs: CMP   Recent Labs   Lab 05/02/23 1924 05/03/23  0849 05/03/23 1952 05/04/23  0849    139 137 138   K 4.5 4.6 4.7 4.5    108 108 106   CO2 19* 19* 19* 19*   * 106 148* 109   BUN 56* 58* 57* 55*   CREATININE 3.8* 3.8* 3.7* 3.8*   CALCIUM 7.1* 7.2* 7.1* 7.5*   PROT 6.0 6.4 6.4  --    ALBUMIN 2.0* 2.2* 2.2*  --    BILITOT 0.3 0.4 0.3  --    ALKPHOS 218* 219* 237*  --    AST 27 22 26  --    ALT 36 31 30  --    ANIONGAP 10 12 10 13     , CBC   Recent Labs   Lab 05/03/23  0248 05/04/23  0317   WBC 5.37 5.39   HGB 10.5* 10.4*   HCT 35.0* 34.6*    307     , and All pertinent lab results from the last 24 hours have been reviewed.    Significant Imaging: Echocardiogram: Transthoracic echo (TTE) complete (Cupid Only):   Results for orders placed or performed during the hospital encounter of 05/01/23   Echo   Result Value Ref Range    Ascending aorta 3.34 cm    STJ 2.58 cm    AV mean gradient 3 mmHg    Ao peak niya 1.21 m/s    Ao VTI 24.23 cm    IVS 0.89 0.6 - 1.1 cm    LA size 4.33 cm    Left Atrium Major Axis  "6.50 cm    Left Atrium Minor Axis 5.59 cm    LVIDd 5.43 3.5 - 6.0 cm    LVIDs 4.79 (A) 2.1 - 4.0 cm    LVOT diameter 1.98 cm    LVOT peak VTI 13.63 cm    Posterior Wall 0.76 0.6 - 1.1 cm    MV Peak A Ty 0.49 m/s    E wave deceleration time 131.42 msec    MV Peak E Ty 0.64 m/s    PV Peak D Ty 0.34 m/s    PV Peak S Ty 0.27 m/s    RA Major Axis 5.14 cm    RA Width 3.91 cm    RVDD 4.13 cm    Sinus 3.01 cm    TAPSE 1.31 cm    TR Max Ty 2.65 m/s    TDI LATERAL 0.06 m/s    TDI SEPTAL 0.05 m/s    LA WIDTH 4.60 cm    MV stenosis pressure 1/2 time 38.11 ms    LV Diastolic Volume 143.29 mL    LV Systolic Volume 107.10 mL    RV S' 5.92 cm/s    LVOT peak ty 0.77 m/s    LA volume (mod) 72.01 cm3    MV "A" wave duration 16.56 msec    LV LATERAL E/E' RATIO 10.67 m/s    LV SEPTAL E/E' RATIO 12.80 m/s    FS 12 %    LA volume 101.76 cm3    LV mass 162.67 g    Left Ventricle Relative Wall Thickness 0.28 cm    AV valve area 1.73 cm2    AV Velocity Ratio 0.64     AV index (prosthetic) 0.56     MV valve area p 1/2 method 5.77 cm2    E/A ratio 1.31     Mean e' 0.06 m/s    Pulm vein S/D ratio 0.79     LVOT area 3.1 cm2    LVOT stroke volume 41.95 cm3    AV peak gradient 6 mmHg    E/E' ratio 11.64 m/s    LV Systolic Volume Index 53.0 mL/m2    LV Diastolic Volume Index 70.94 mL/m2    LA Volume Index 50.4 mL/m2    LV Mass Index 81 g/m2    Triscuspid Valve Regurgitation Peak Gradient 28 mmHg    LA Volume Index (Mod) 35.6 mL/m2    BSA 2.09 m2    Right Atrial Pressure (from IVC) 15 mmHg    EF 15 %    TV rest pulmonary artery pressure 43 mmHg    Narrative    · The left ventricle is mildly enlarged with severely decreased systolic   function. The estimated ejection fraction is 15%.  · There is severe left ventricular global hypokinesis.  · Normal right ventricular size with low normal right ventricular systolic   function.  · Grade I left ventricular diastolic dysfunction.  · Biatrial enlargement.  · Mild-to-moderate mitral regurgitation.  · " Severe tricuspid regurgitation.  · The estimated PA systolic pressure is 43 mmHg.  · Elevated central venous pressure (15 mmHg).

## 2023-05-04 NOTE — ASSESSMENT & PLAN NOTE
Patient with significant cardiovascular history presenting for ADHF with newly depressed EF  Patient not on GDMT  Takes lasix 40mg daily    TTE 15% (previously 55%), biatrial enlargement, CVP 15, PASP 43, severe tricuspid regurgitation, not noted on prior in 2021  .2, HgA1c 6.2    Recommendations:  - Transition to Lasix ggt 15mg/hr for daily goal net negative 2-3L  - Start Bidil  - BMP BID to assess for close electrolyte monitoring, goal K>4, Mg>2  - Inpatient vs outpatient stress testing once patient euvolemic  - Strict I/Os with daily weights  - 1.5L fluid restriction, low Na diet  - recommend starting GDMT when patient euvolemic and renal function improves

## 2023-05-04 NOTE — ASSESSMENT & PLAN NOTE
History of bilateral AKA.        Complex Repair Preamble Text (Leave Blank If You Do Not Want): Extensive wide undermining was performed.

## 2023-05-04 NOTE — PLAN OF CARE
Problem: Occupational Therapy  Goal: Occupational Therapy Goal  Description: Goals to be met by: 5/25     Patient will increase functional independence with ADLs by performing:    UE Dressing with Modified [place].  LE Dressing with Modified [place].  Sitting at edge of bed x10 minutes with Stand-by Assistance.  Rolling to Bilateral with Stand-by Assistance.   Supine to sit with [place].    Outcome: Ongoing, Progressing   Pts goals are set.

## 2023-05-04 NOTE — HOSPITAL COURSE
Patient admitted for management of acute on chronic heart failure. Was patient developed significant ROSLYN likely due to cardiorenal syndrome. S/p RHC that showed wedge of 26-32 with CVP of 15. Patient was treated with multiple different diuretics and had minimal urine output. Patient with no improvement in renal function, nephrology believes this is her new baseline. Patient developed uremia and AMS given her renal impairment and elevated BUN. IR placed tunneled cath on 5/25 and patient underwent HD. She had a significant improvement in her mental status after HD and is now close to her baseline. Patient discharged on 5/29. She will have follow ups with cardiology, endocrine, nephrology, PCP, endocrine, and palliative. She will be getting home health. She has a dialysis chair setup and will start outpatient HD on 5/31. Patient to have a hospital bed and jany lift delivered to her home in the near future.     Physical Exam  Constitutional:       General: She is not in acute distress.     Appearance: She is obese. She is not ill-appearing or toxic-appearing.   HENT:      Head: Normocephalic and atraumatic.   Cardiovascular:      Pulses: Normal pulses.      Heart sounds: Murmur heard.   Pulmonary:      Effort: Pulmonary effort is normal. No respiratory distress.   Abdominal:      General: Abdomen is flat.   Musculoskeletal:      Comments: Bilateral AKA   Neurological:      Mental Status: She is alert.

## 2023-05-05 LAB
ANION GAP SERPL CALC-SCNC: 10 MMOL/L (ref 8–16)
ANION GAP SERPL CALC-SCNC: 13 MMOL/L (ref 8–16)
BUN SERPL-MCNC: 54 MG/DL (ref 6–20)
BUN SERPL-MCNC: 59 MG/DL (ref 6–20)
CALCIUM SERPL-MCNC: 7.2 MG/DL (ref 8.7–10.5)
CALCIUM SERPL-MCNC: 7.4 MG/DL (ref 8.7–10.5)
CHLORIDE SERPL-SCNC: 105 MMOL/L (ref 95–110)
CHLORIDE SERPL-SCNC: 107 MMOL/L (ref 95–110)
CO2 SERPL-SCNC: 21 MMOL/L (ref 23–29)
CO2 SERPL-SCNC: 21 MMOL/L (ref 23–29)
CREAT SERPL-MCNC: 4 MG/DL (ref 0.5–1.4)
CREAT SERPL-MCNC: 4.2 MG/DL (ref 0.5–1.4)
EST. GFR  (NO RACE VARIABLE): 11.8 ML/MIN/1.73 M^2
EST. GFR  (NO RACE VARIABLE): 12.5 ML/MIN/1.73 M^2
GLUCOSE SERPL-MCNC: 111 MG/DL (ref 70–110)
GLUCOSE SERPL-MCNC: 174 MG/DL (ref 70–110)
MAGNESIUM SERPL-MCNC: 1.9 MG/DL (ref 1.6–2.6)
POCT GLUCOSE: 146 MG/DL (ref 70–110)
POCT GLUCOSE: 178 MG/DL (ref 70–110)
POCT GLUCOSE: 198 MG/DL (ref 70–110)
POTASSIUM SERPL-SCNC: 4.4 MMOL/L (ref 3.5–5.1)
POTASSIUM SERPL-SCNC: 4.4 MMOL/L (ref 3.5–5.1)
SODIUM SERPL-SCNC: 138 MMOL/L (ref 136–145)
SODIUM SERPL-SCNC: 139 MMOL/L (ref 136–145)

## 2023-05-05 PROCEDURE — 36415 COLL VENOUS BLD VENIPUNCTURE: CPT

## 2023-05-05 PROCEDURE — 99232 SBSQ HOSP IP/OBS MODERATE 35: CPT | Mod: GC,,, | Performed by: INTERNAL MEDICINE

## 2023-05-05 PROCEDURE — 25000003 PHARM REV CODE 250

## 2023-05-05 PROCEDURE — 63600175 PHARM REV CODE 636 W HCPCS

## 2023-05-05 PROCEDURE — 80048 BASIC METABOLIC PNL TOTAL CA: CPT

## 2023-05-05 PROCEDURE — 80048 BASIC METABOLIC PNL TOTAL CA: CPT | Mod: 91

## 2023-05-05 PROCEDURE — 25000003 PHARM REV CODE 250: Performed by: STUDENT IN AN ORGANIZED HEALTH CARE EDUCATION/TRAINING PROGRAM

## 2023-05-05 PROCEDURE — 83735 ASSAY OF MAGNESIUM: CPT

## 2023-05-05 PROCEDURE — 99233 SBSQ HOSP IP/OBS HIGH 50: CPT | Mod: GC,,, | Performed by: HOSPITALIST

## 2023-05-05 PROCEDURE — 99232 PR SUBSEQUENT HOSPITAL CARE,LEVL II: ICD-10-PCS | Mod: GC,,, | Performed by: INTERNAL MEDICINE

## 2023-05-05 PROCEDURE — 21400001 HC TELEMETRY ROOM

## 2023-05-05 PROCEDURE — 99233 PR SUBSEQUENT HOSPITAL CARE,LEVL III: ICD-10-PCS | Mod: GC,,, | Performed by: HOSPITALIST

## 2023-05-05 RX ORDER — ACETAMINOPHEN 325 MG/1
650 TABLET ORAL EVERY 4 HOURS PRN
Status: DISCONTINUED | OUTPATIENT
Start: 2023-05-05 | End: 2023-05-06

## 2023-05-05 RX ADMIN — ATORVASTATIN CALCIUM 80 MG: 40 TABLET, FILM COATED ORAL at 10:05

## 2023-05-05 RX ADMIN — ASPIRIN 81 MG: 81 TABLET, COATED ORAL at 10:05

## 2023-05-05 RX ADMIN — INSULIN ASPART 3 UNITS: 100 INJECTION, SOLUTION INTRAVENOUS; SUBCUTANEOUS at 06:05

## 2023-05-05 RX ADMIN — ISOSORBIDE DINITRATE: 20 TABLET ORAL at 10:05

## 2023-05-05 RX ADMIN — ISOSORBIDE DINITRATE: 20 TABLET ORAL at 09:05

## 2023-05-05 RX ADMIN — DOCUSATE SODIUM 100 MG: 100 CAPSULE, LIQUID FILLED ORAL at 09:05

## 2023-05-05 RX ADMIN — ACETAMINOPHEN 650 MG: 325 TABLET ORAL at 04:05

## 2023-05-05 RX ADMIN — SODIUM BICARBONATE 1300 MG: 650 TABLET ORAL at 09:05

## 2023-05-05 RX ADMIN — PANTOPRAZOLE SODIUM 40 MG: 40 TABLET, DELAYED RELEASE ORAL at 10:05

## 2023-05-05 RX ADMIN — SODIUM BICARBONATE 1300 MG: 650 TABLET ORAL at 04:05

## 2023-05-05 RX ADMIN — DOCUSATE SODIUM 100 MG: 100 CAPSULE, LIQUID FILLED ORAL at 10:05

## 2023-05-05 RX ADMIN — CLOPIDOGREL BISULFATE 75 MG: 75 TABLET ORAL at 10:05

## 2023-05-05 RX ADMIN — AMITRIPTYLINE HYDROCHLORIDE 50 MG: 25 TABLET, FILM COATED ORAL at 09:05

## 2023-05-05 RX ADMIN — INSULIN DETEMIR 8 UNITS: 100 INJECTION, SOLUTION SUBCUTANEOUS at 09:05

## 2023-05-05 RX ADMIN — FUROSEMIDE 20 MG/HR: 10 INJECTION, SOLUTION INTRAMUSCULAR; INTRAVENOUS at 06:05

## 2023-05-05 RX ADMIN — SERTRALINE HYDROCHLORIDE 50 MG: 50 TABLET ORAL at 10:05

## 2023-05-05 RX ADMIN — ALLOPURINOL 100 MG: 100 TABLET ORAL at 10:05

## 2023-05-05 RX ADMIN — INSULIN ASPART 3 UNITS: 100 INJECTION, SOLUTION INTRAVENOUS; SUBCUTANEOUS at 10:05

## 2023-05-05 RX ADMIN — CARVEDILOL 3.12 MG: 3.12 TABLET, FILM COATED ORAL at 10:05

## 2023-05-05 RX ADMIN — SODIUM BICARBONATE 1300 MG: 650 TABLET ORAL at 10:05

## 2023-05-05 RX ADMIN — ACETAMINOPHEN 650 MG: 325 TABLET ORAL at 09:05

## 2023-05-05 NOTE — SUBJECTIVE & OBJECTIVE
Interval History: No acute events overnight. Remains HDS. On lasix 20mg/hr, net negative 300cc over past 24 hours. Net -2.7L since admit. Continuing to have dyspnea, orthopnea, and LE edema bilaterally. Denies palpitations, lightheadedness, dizziness, cough.  On room air.    Review of Systems   Cardiovascular:  Positive for chest pain, dyspnea on exertion and orthopnea. Negative for palpitations and syncope.   Respiratory:  Positive for shortness of breath. Negative for cough, hemoptysis, sputum production and wheezing.    Objective:     Vital Signs (Most Recent):  Temp: 97.8 °F (36.6 °C) (05/05/23 1144)  Pulse: 87 (05/05/23 1144)  Resp: 12 (05/05/23 1144)  BP: (!) 107/54 (05/05/23 1144)  SpO2: 97 % (05/05/23 1144)   Vital Signs (24h Range):  Temp:  [97.8 °F (36.6 °C)-98.1 °F (36.7 °C)] 97.8 °F (36.6 °C)  Pulse:  [81-95] 87  Resp:  [12-18] 12  SpO2:  [95 %-100 %] 97 %  BP: (107-139)/(54-76) 107/54     Weight: 95.3 kg (210 lb)  Body mass index is 34.95 kg/m².     SpO2: 97 %         Intake/Output Summary (Last 24 hours) at 5/5/2023 1238  Last data filed at 5/5/2023 1038  Gross per 24 hour   Intake 965.09 ml   Output 1045 ml   Net -79.91 ml         Lines/Drains/Airways       Drain  Duration             Female External Urinary Catheter 05/03/23 0805 2 days              Peripheral Intravenous Line  Duration                  Peripheral IV - Single Lumen 05/01/23 1604 20 G Posterior;Right Hand 3 days                    Physical Exam  Vitals and nursing note reviewed.   Constitutional:       General: She is not in acute distress.     Appearance: She is obese. She is not ill-appearing or toxic-appearing.   HENT:      Head: Normocephalic and atraumatic.      Mouth/Throat:      Mouth: Mucous membranes are moist.      Pharynx: No oropharyngeal exudate.   Eyes:      Extraocular Movements: Extraocular movements intact.      Conjunctiva/sclera: Conjunctivae normal.   Neck:      Vascular: JVD present.   Cardiovascular:      Rate  and Rhythm: Normal rate and regular rhythm.   Pulmonary:      Effort: Pulmonary effort is normal. No respiratory distress.   Abdominal:      General: Bowel sounds are normal. There is distension.      Palpations: Abdomen is soft.      Tenderness: There is no abdominal tenderness. There is no guarding.   Musculoskeletal:      Cervical back: Normal range of motion and neck supple.      Right lower leg: Edema present.      Left lower leg: Edema present.      Comments: Patient with bilateral AKA. Edema of thighs   Skin:     General: Skin is warm.      Coloration: Skin is not jaundiced.      Findings: No bruising.   Neurological:      General: No focal deficit present.      Mental Status: She is alert and oriented to person, place, and time. Mental status is at baseline.       Significant Labs: CMP   Recent Labs   Lab 05/03/23  1952 05/04/23  0849 05/04/23  1846 05/05/23  0938    138 137 139   K 4.7 4.5 4.4 4.4    106 106 105   CO2 19* 19* 20* 21*   * 109 158* 111*   BUN 57* 55* 56* 59*   CREATININE 3.7* 3.8* 3.8* 4.2*   CALCIUM 7.1* 7.5* 7.2* 7.4*   PROT 6.4  --   --   --    ALBUMIN 2.2*  --   --   --    BILITOT 0.3  --   --   --    ALKPHOS 237*  --   --   --    AST 26  --   --   --    ALT 30  --   --   --    ANIONGAP 10 13 11 13     , CBC   Recent Labs   Lab 05/04/23  0317   WBC 5.39   HGB 10.4*   HCT 34.6*        , and All pertinent lab results from the last 24 hours have been reviewed.    Significant Imaging: Echocardiogram: Transthoracic echo (TTE) complete (C[facility] Only):   Results for orders placed or performed during the hospital encounter of 05/01/23   Echo   Result Value Ref Range    Ascending aorta 3.34 cm    STJ 2.58 cm    AV mean gradient 3 mmHg    Ao peak niya 1.21 m/s    Ao VTI 24.23 cm    IVS 0.89 0.6 - 1.1 cm    LA size 4.33 cm    Left Atrium Major Axis 6.50 cm    Left Atrium Minor Axis 5.59 cm    LVIDd 5.43 3.5 - 6.0 cm    LVIDs 4.79 (A) 2.1 - 4.0 cm    LVOT diameter 1.98 cm     "LVOT peak VTI 13.63 cm    Posterior Wall 0.76 0.6 - 1.1 cm    MV Peak A Ty 0.49 m/s    E wave deceleration time 131.42 msec    MV Peak E Ty 0.64 m/s    PV Peak D Ty 0.34 m/s    PV Peak S Ty 0.27 m/s    RA Major Axis 5.14 cm    RA Width 3.91 cm    RVDD 4.13 cm    Sinus 3.01 cm    TAPSE 1.31 cm    TR Max Ty 2.65 m/s    TDI LATERAL 0.06 m/s    TDI SEPTAL 0.05 m/s    LA WIDTH 4.60 cm    MV stenosis pressure 1/2 time 38.11 ms    LV Diastolic Volume 143.29 mL    LV Systolic Volume 107.10 mL    RV S' 5.92 cm/s    LVOT peak ty 0.77 m/s    LA volume (mod) 72.01 cm3    MV "A" wave duration 16.56 msec    LV LATERAL E/E' RATIO 10.67 m/s    LV SEPTAL E/E' RATIO 12.80 m/s    FS 12 %    LA volume 101.76 cm3    LV mass 162.67 g    Left Ventricle Relative Wall Thickness 0.28 cm    AV valve area 1.73 cm2    AV Velocity Ratio 0.64     AV index (prosthetic) 0.56     MV valve area p 1/2 method 5.77 cm2    E/A ratio 1.31     Mean e' 0.06 m/s    Pulm vein S/D ratio 0.79     LVOT area 3.1 cm2    LVOT stroke volume 41.95 cm3    AV peak gradient 6 mmHg    E/E' ratio 11.64 m/s    LV Systolic Volume Index 53.0 mL/m2    LV Diastolic Volume Index 70.94 mL/m2    LA Volume Index 50.4 mL/m2    LV Mass Index 81 g/m2    Triscuspid Valve Regurgitation Peak Gradient 28 mmHg    LA Volume Index (Mod) 35.6 mL/m2    BSA 2.09 m2    Right Atrial Pressure (from IVC) 15 mmHg    EF 15 %    TV rest pulmonary artery pressure 43 mmHg    Narrative    · The left ventricle is mildly enlarged with severely decreased systolic   function. The estimated ejection fraction is 15%.  · There is severe left ventricular global hypokinesis.  · Normal right ventricular size with low normal right ventricular systolic   function.  · Grade I left ventricular diastolic dysfunction.  · Biatrial enlargement.  · Mild-to-moderate mitral regurgitation.  · Severe tricuspid regurgitation.  · The estimated PA systolic pressure is 43 mmHg.  · Elevated central venous pressure (15 " mmHg).

## 2023-05-05 NOTE — ASSESSMENT & PLAN NOTE
Last Echo 6/21 showing     The estimated ejection fraction is 55%.   The left ventricle is normal in size with normal systolic function.   Normal left ventricular diastolic function.   Normal right ventricular size with normal right ventricular systolic function.   Mild tricuspid regurgitation.   The estimated PA systolic pressure is 31 mmHg.   Normal central venous pressure (3 mmHg).    Echo    Result Date: 5/2/2023  · The left ventricle is mildly enlarged with severely decreased systolic   function. The estimated ejection fraction is 15%.  · There is severe left ventricular global hypokinesis.  · Normal right ventricular size with low normal right ventricular systolic   function.  · Grade I left ventricular diastolic dysfunction.  · Biatrial enlargement.  · Mild-to-moderate mitral regurgitation.  · Severe tricuspid regurgitation.  · The estimated PA systolic pressure is 43 mmHg.  · Elevated central venous pressure (15 mmHg).           -IV Diuresis  -Qq4 BMPs with electrolyte repletion as needed; goal K>4, Mg>2  -Cardiology consult for acute decompensated heart failure with reduced EF   -Transition from PO to IV Lasix 80mg TID for daily goal net negative 2-3L  - Inpatient vs outpatient stress testing once patient euvolemic  - Strict I/Os with daily weights  - 1.5L fluid restriction, low Na diet  - recommend starting GDMT when patient euvolemic and renal function improves       Patient is alert and oriented x3 c/o burning with pain inside his penis x6 months. also c/o dysuria. denies hematuria. denies fevers.

## 2023-05-05 NOTE — PROGRESS NOTES
[facility] ([address] Medicine  Progress Note    Patient Name: Suyapa Connelly  MRN: [id number]  Patient Class: IP- Inpatient   Admission Date: 5/1/2023  Length of Stay: 2 days  Attending Physician: Anna Lopez MD  Primary Care Provider: Magen Christensen MD        Subjective:     Principal Problem:Acute on chronic combined systolic and diastolic heart failure        HPI:  57 y.o. female with past medical history of CAD s/p CABG, CKD, DM, HTN, HLD, s/p bilateral AKA who presnets with chest pain, difficulty breathing, leg swelling.     Per ED She has been unable to access any of her medications for the last 3 months. Over the last few months she has noted worsening shortness of breath, particulatly orthopnea, and leg swelling. In the last two weeks she endorses associated abdominal pain and swelling, reduced appetite, and pleuritic chest pain with deep inspiration. She endorses occasional lighthededness with changes in position. Last week she had an episode where she was unable to breathe for about two minutes which was very upsetting for her and her , but she recovered and did not seek care at that time. Today, she complains of significant difficulty breathing, swelling in her legs and abdomen, and reduced appetite.     No headaches, visual changes, URI symptoms, palpitations, nausea, vomiting, diarrhea, blood in her stool, dysuria, hematuria, numbness or weakness in her extremities. She does endorse occasional paresthesias associated with her chronic phantom limb pain. She additionally has had increased difficulties with depression recently, denies suicidal or homicidal ideation, but has had considerable stress and low mood.      Of note, the patient saw a new primary care provider 4/21 for similar concerns who recommended she proceed to the ED at that time, but they were unable due to financial and geographic concerns. She lives two hours away from [facility] and her  cares  for her and elderly mother with dementia. She has had decreased access to medical care over the last year, and unable to access most of her prescriptions. She had a 90 day supply of four medications (lasix, statin, clopidogrel, sertraline) but these were accidentally lost and she has been unable to afford a replacement. They present today for evaluation and to reestablish care and access to her prescriptions.     Labs on admission significant for an elevated BNP, hypocalcemia which corrected is 8.3, significant elevation in Creatinine and a slight elevation of troponin    CXR  Cardiomegaly with pulmonary interstitial edema, suggestive of pulmonary edema secondary to CHF.      Overview/Hospital Course:  Diuresis initiated with Lasix IV.  1.2L UOP in the past 24 hours.  Vitals reassuring with adequate sats on ambient air.  Still very edematious; Cardiology recs lasix gtt.  GDMT initiated.      Interval History: Pt feeling better today. Edema improving daily. Urine output not recorded accurately due to problems with purewick.     Review of Systems   Constitutional:  Negative for chills, fatigue and fever.   HENT:  Negative for congestion, dental problem, facial swelling, postnasal drip, rhinorrhea, sore throat and trouble swallowing.    Eyes:  Negative for photophobia and pain.   Respiratory:  Negative for cough, choking, chest tightness, shortness of breath and wheezing.    Cardiovascular:  Positive for leg swelling. Negative for chest pain.   Gastrointestinal:  Negative for abdominal distention, abdominal pain, diarrhea, nausea and vomiting.   Endocrine: Negative.    Genitourinary:  Negative for dysuria, flank pain, frequency and hematuria.   Musculoskeletal:  Negative for back pain.   Skin:  Negative for pallor and rash.   Allergic/Immunologic: Negative.    Neurological:  Negative for dizziness, syncope, weakness, numbness and headaches.   Psychiatric/Behavioral:  The patient is not nervous/anxious.    Objective:      Vital Signs (Most Recent):  Temp: 97.8 °F (36.6 °C) (05/05/23 1144)  Pulse: 87 (05/05/23 1144)  Resp: 12 (05/05/23 1144)  BP: (!) 107/54 (05/05/23 1144)  SpO2: 97 % (05/05/23 1144)   Vital Signs (24h Range):  Temp:  [97.8 °F (36.6 °C)-98.1 °F (36.7 °C)] 97.8 °F (36.6 °C)  Pulse:  [81-95] 87  Resp:  [12-18] 12  SpO2:  [95 %-100 %] 97 %  BP: (107-139)/(54-76) 107/54     Weight: 95.3 kg (210 lb)  Body mass index is 34.95 kg/m².    Intake/Output Summary (Last 24 hours) at 5/5/2023 1201  Last data filed at 5/5/2023 0600  Gross per 24 hour   Intake 743.09 ml   Output 1045 ml   Net -301.91 ml         Physical Exam  Vitals and nursing note reviewed.   Constitutional:       General: She is not in acute distress.     Appearance: Normal appearance. She is obese. She is not ill-appearing or diaphoretic.   HENT:      Head: Normocephalic and atraumatic.      Right Ear: External ear normal.      Left Ear: External ear normal.      Nose: Nose normal.      Mouth/Throat:      Mouth: Mucous membranes are moist.      Pharynx: Oropharynx is clear.   Eyes:      General: No scleral icterus.     Extraocular Movements: Extraocular movements intact.      Conjunctiva/sclera: Conjunctivae normal.      Pupils: Pupils are equal, round, and reactive to light.   Cardiovascular:      Rate and Rhythm: Normal rate and regular rhythm.      Pulses: Normal pulses.      Heart sounds: Normal heart sounds. No murmur heard.  Pulmonary:      Effort: Pulmonary effort is normal. No respiratory distress.      Breath sounds: Normal breath sounds. No stridor. No wheezing or rhonchi.   Chest:      Chest wall: No tenderness.   Abdominal:      General: Abdomen is flat. There is no distension.      Palpations: Abdomen is soft.      Tenderness: There is no abdominal tenderness. There is no guarding or rebound.   Musculoskeletal:         General: Swelling present. No tenderness. Normal range of motion.      Cervical back: Normal range of motion and neck supple. No  rigidity or tenderness.      Right lower leg: Edema present.      Left lower leg: Edema present.      Comments: Trigger finger R middle finger   Skin:     General: Skin is warm and dry.      Capillary Refill: Capillary refill takes less than 2 seconds.      Coloration: Skin is not jaundiced.      Findings: No erythema.   Neurological:      General: No focal deficit present.      Mental Status: She is alert and oriented to person, place, and time.      Cranial Nerves: No cranial nerve deficit.      Motor: No weakness.   Psychiatric:         Mood and Affect: Mood normal.         Behavior: Behavior normal.           Significant Labs: All pertinent labs within the past 24 hours have been reviewed.    Significant Imaging: I have reviewed all pertinent imaging results/findings within the past 24 hours.      Assessment/Plan:      * Acute on chronic combined systolic and diastolic heart failure  Last Echo 6/21 showing     The estimated ejection fraction is 55%.   The left ventricle is normal in size with normal systolic function.   Normal left ventricular diastolic function.   Normal right ventricular size with normal right ventricular systolic function.   Mild tricuspid regurgitation.   The estimated PA systolic pressure is 31 mmHg.   Normal central venous pressure (3 mmHg).    Echo    Result Date: 5/2/2023  · The left ventricle is mildly enlarged with severely decreased systolic   function. The estimated ejection fraction is 15%.  · There is severe left ventricular global hypokinesis.  · Normal right ventricular size with low normal right ventricular systolic   function.  · Grade I left ventricular diastolic dysfunction.  · Biatrial enlargement.  · Mild-to-moderate mitral regurgitation.  · Severe tricuspid regurgitation.  · The estimated PA systolic pressure is 43 mmHg.  · Elevated central venous pressure (15 mmHg).           -IV Diuresis  -Qq4 BMPs with electrolyte repletion as needed; goal K>4, Mg>2  -Cardiology  consult for acute decompensated heart failure with reduced EF   -Transition from PO to IV Lasix 80mg TID for daily goal net negative 2-3L  - Inpatient vs outpatient stress testing once patient euvolemic  - Strict I/Os with daily weights  - 1.5L fluid restriction, low Na diet  - recommend starting GDMT when patient euvolemic and renal function improves        ROSLYN (acute kidney injury)  Pt with increased Cr to 3.9 from 2.0 baseline. Possibly due to renovascular congestion from volume overload.     -Kerns  -Strict Is/Os  -Avoid nephrotoxic agents  -Lasix 20mg/hr  -Daily CMPs        Acute hypoxemic respiratory failure  See Acute on chronic CHF    CKD (chronic kidney disease), stage IV  History of CKD. See ROSLYN.      Above-knee amputation of right lower extremity  Prior history consistent with exam      Uses self-applied continuous glucose monitoring device  POCT glucose and daily CMPs    -Restart insulin for hyperglycemia      Dermatitis associated with moisture  Large body habitus with dermatitis to folds. Will monitor for infection.    -Zinc based powder  -Topical abx if indicated      Status post above knee amputation, left  History of.      Hypoalbuminemia  Pt with poor PO intake reported. Pt eats one meal a day and has associated nausea and vomiting. Will try antiemetics before feeds to improve intake. Will monitor daily intake and calorie replacement.     -Calorie count  -Diabetic/Renal diet  -Antiemetics before meals; Zofran 4 IV PRN  -Monitor Qtc for prolongation with antiemetics      Impaired functional mobility and endurance  PT/OT for bedbound patient      Paroxysmal atrial fibrillation  Continue home rate control  NSR on EKG        Anemia  Asymptomatic. Higher than 8 months ago. No signs of acute bleed at this time. Haptoglobin elevated.    -CBC daily  -Transfuse if <7        S/P CABG x 1  History of. Sternal scar well healed.       CAD (coronary artery disease)  History of CAD with retrosternal chest pain.  EKG negative to ST changes. Nonspecific T wave inversions.     -Cardiac tele  -EKG prn  -Bidil per Cards recs      CAROLIN (generalized anxiety disorder)  Continue home anxiety medications      Essential hypertension  Continue home antihypertensives      PAD (peripheral artery disease)  History of bilateral AKA.           VTE Risk Mitigation (From admission, onward)    None          Discharge Planning   DEVAN: 5/8/2023     Code Status: Full Code   Is the patient medically ready for discharge?: No    Reason for patient still in hospital (select all that apply): Patient trending condition and Treatment  Discharge Plan A: Home Health   Discharge Delays: None known at this time      Onur Bueno MD  Department of Hospital Medicine   [facility] - Intensive Care ([place])

## 2023-05-05 NOTE — ASSESSMENT & PLAN NOTE
Patient with significant cardiovascular history presenting for ADHF with newly depressed EF  Patient not on GDMT  Takes lasix 40mg daily    TTE 15% (previously 55%), biatrial enlargement, CVP 15, PASP 43, severe tricuspid regurgitation, not noted on prior in 2021  .2, HgA1c 6.2    Recommendations:  - Please give lasix bolus 320mg IV  - Increase Lasix ggt to 40mg/hr for daily goal net negative 2-3L  - Recommend full workup for ROSLYN given minimal output on current regimen and worsening ROSLYN   Renal US   Urine lytes   Bladder scan  - BMP BID to assess for close electrolyte monitoring, goal K>4, Mg>2  - Inpatient stress testing once patient euvolemic  - Strict I/Os with daily weights  - 1.5L fluid restriction, low Na diet  - recommend starting GDMT when patient euvolemic and renal function improves

## 2023-05-05 NOTE — PROGRESS NOTES
[facility] ([place] [address])  Cardiology  Progress Note    Patient Name: Suyapa Connelly  MRN: [id number]  Admission Date: 5/1/2023  Hospital Length of Stay: 2 days  Code Status: Full Code   Attending Physician: Anna Lopez MD   Primary Care Physician: Magen Christensen MD  Expected Discharge Date: 5/8/2023  Principal Problem:Acute on chronic combined systolic and diastolic heart failure    Subjective:     Interval History: No acute events overnight. Remains HDS. On lasix 20mg/hr, net negative ~1L  per primary team (purewick placed incorrectly)  Continuing to have orthopnea, and LE edema bilaterally. JVD remains elevated to mid neck. Denies palpitations, lightheadedness, dizziness, cough.  On room air. Cr continues to uptrend, 4.2 today, will continue lasix 20mg/hr and monitor urine output. Recommend full workup of ROSLYN    Review of Systems   Cardiovascular:  Positive for chest pain, dyspnea on exertion and orthopnea. Negative for palpitations and syncope.   Respiratory:  Positive for shortness of breath. Negative for cough, hemoptysis, sputum production and wheezing.    Objective:     Vital Signs (Most Recent):  Temp: 97.8 °F (36.6 °C) (05/05/23 1144)  Pulse: 87 (05/05/23 1144)  Resp: 12 (05/05/23 1144)  BP: (!) 107/54 (05/05/23 1144)  SpO2: 97 % (05/05/23 1144)   Vital Signs (24h Range):  Temp:  [97.8 °F (36.6 °C)-98.1 °F (36.7 °C)] 97.8 °F (36.6 °C)  Pulse:  [81-95] 87  Resp:  [12-18] 12  SpO2:  [95 %-100 %] 97 %  BP: (107-139)/(54-76) 107/54     Weight: 95.3 kg (210 lb)  Body mass index is 34.95 kg/m².     SpO2: 97 %         Intake/Output Summary (Last 24 hours) at 5/5/2023 1238  Last data filed at 5/5/2023 1038  Gross per 24 hour   Intake 965.09 ml   Output 1045 ml   Net -79.91 ml         Lines/Drains/Airways       Drain  Duration             Female External Urinary Catheter 05/03/23 0805 2 days              Peripheral Intravenous Line  Duration                  Peripheral IV - Single Lumen  05/01/23 1604 20 G Posterior;Right Hand 3 days                    Physical Exam  Vitals and nursing note reviewed.   Constitutional:       General: She is not in acute distress.     Appearance: She is obese. She is not ill-appearing or toxic-appearing.   HENT:      Head: Normocephalic and atraumatic.      Mouth/Throat:      Mouth: Mucous membranes are moist.      Pharynx: No oropharyngeal exudate.   Eyes:      Extraocular Movements: Extraocular movements intact.      Conjunctiva/sclera: Conjunctivae normal.   Neck:      Vascular: JVD present.   Cardiovascular:      Rate and Rhythm: Normal rate and regular rhythm.   Pulmonary:      Effort: Pulmonary effort is normal. No respiratory distress.   Abdominal:      General: Bowel sounds are normal. There is distension.      Palpations: Abdomen is soft.      Tenderness: There is no abdominal tenderness. There is no guarding.   Musculoskeletal:      Cervical back: Normal range of motion and neck supple.      Right lower leg: Edema present.      Left lower leg: Edema present.      Comments: Patient with bilateral AKA. Edema of thighs   Skin:     General: Skin is warm.      Coloration: Skin is not jaundiced.      Findings: No bruising.   Neurological:      General: No focal deficit present.      Mental Status: She is alert and oriented to person, place, and time. Mental status is at baseline.       Significant Labs: CMP   Recent Labs   Lab 05/03/23  1952 05/04/23  0849 05/04/23  1846 05/05/23  0938    138 137 139   K 4.7 4.5 4.4 4.4    106 106 105   CO2 19* 19* 20* 21*   * 109 158* 111*   BUN 57* 55* 56* 59*   CREATININE 3.7* 3.8* 3.8* 4.2*   CALCIUM 7.1* 7.5* 7.2* 7.4*   PROT 6.4  --   --   --    ALBUMIN 2.2*  --   --   --    BILITOT 0.3  --   --   --    ALKPHOS 237*  --   --   --    AST 26  --   --   --    ALT 30  --   --   --    ANIONGAP 10 13 11 13     , CBC   Recent Labs   Lab 05/04/23  0317   WBC 5.39   HGB 10.4*   HCT 34.6*        , and All  "pertinent lab results from the last 24 hours have been reviewed.    Significant Imaging: Echocardiogram: Transthoracic echo (TTE) complete (Cupid Only):   Results for orders placed or performed during the hospital encounter of 05/01/23   Echo   Result Value Ref Range    Ascending aorta 3.34 cm    STJ 2.58 cm    AV mean gradient 3 mmHg    Ao peak ty 1.21 m/s    Ao VTI 24.23 cm    IVS 0.89 0.6 - 1.1 cm    LA size 4.33 cm    Left Atrium Major Axis 6.50 cm    Left Atrium Minor Axis 5.59 cm    LVIDd 5.43 3.5 - 6.0 cm    LVIDs 4.79 (A) 2.1 - 4.0 cm    LVOT diameter 1.98 cm    LVOT peak VTI 13.63 cm    Posterior Wall 0.76 0.6 - 1.1 cm    MV Peak A Ty 0.49 m/s    E wave deceleration time 131.42 msec    MV Peak E Ty 0.64 m/s    PV Peak D Ty 0.34 m/s    PV Peak S Ty 0.27 m/s    RA Major Axis 5.14 cm    RA Width 3.91 cm    RVDD 4.13 cm    Sinus 3.01 cm    TAPSE 1.31 cm    TR Max Ty 2.65 m/s    TDI LATERAL 0.06 m/s    TDI SEPTAL 0.05 m/s    LA WIDTH 4.60 cm    MV stenosis pressure 1/2 time 38.11 ms    LV Diastolic Volume 143.29 mL    LV Systolic Volume 107.10 mL    RV S' 5.92 cm/s    LVOT peak ty 0.77 m/s    LA volume (mod) 72.01 cm3    MV "A" wave duration 16.56 msec    LV LATERAL E/E' RATIO 10.67 m/s    LV SEPTAL E/E' RATIO 12.80 m/s    FS 12 %    LA volume 101.76 cm3    LV mass 162.67 g    Left Ventricle Relative Wall Thickness 0.28 cm    AV valve area 1.73 cm2    AV Velocity Ratio 0.64     AV index (prosthetic) 0.56     MV valve area p 1/2 method 5.77 cm2    E/A ratio 1.31     Mean e' 0.06 m/s    Pulm vein S/D ratio 0.79     LVOT area 3.1 cm2    LVOT stroke volume 41.95 cm3    AV peak gradient 6 mmHg    E/E' ratio 11.64 m/s    LV Systolic Volume Index 53.0 mL/m2    LV Diastolic Volume Index 70.94 mL/m2    LA Volume Index 50.4 mL/m2    LV Mass Index 81 g/m2    Triscuspid Valve Regurgitation Peak Gradient 28 mmHg    LA Volume Index (Mod) 35.6 mL/m2    BSA 2.09 m2    Right Atrial Pressure (from IVC) 15 mmHg    EF 15 % "    TV rest pulmonary artery pressure 43 mmHg    Narrative    · The left ventricle is mildly enlarged with severely decreased systolic   function. The estimated ejection fraction is 15%.  · There is severe left ventricular global hypokinesis.  · Normal right ventricular size with low normal right ventricular systolic   function.  · Grade I left ventricular diastolic dysfunction.  · Biatrial enlargement.  · Mild-to-moderate mitral regurgitation.  · Severe tricuspid regurgitation.  · The estimated PA systolic pressure is 43 mmHg.  · Elevated central venous pressure (15 mmHg).        Assessment and Plan:       * Acute on chronic combined systolic and diastolic heart failure  Patient with significant cardiovascular history presenting for ADHF with newly depressed EF  Patient not on GDMT  Takes lasix 40mg daily    TTE 15% (previously 55%), biatrial enlargement, CVP 15, PASP 43, severe tricuspid regurgitation, not noted on prior in 2021  .2, HgA1c 6.2    Recommendations:  - Continue lasix ggt 20mg/hr daily goal net negative 2-3L  - Recommend full workup for worsening ROSLYN   Renal US, Urine lytes, UA  - BMP BID to assess for close electrolyte monitoring, goal K>4, Mg>2  - Inpatient stress testing once patient euvolemic  - Strict I/Os with daily weights  - 1.5L fluid restriction, low Na diet  - recommend starting GDMT when patient euvolemic and renal function improves        VTE Risk Mitigation (From admission, onward)      None            Omar Miller MD  Cardiology  [facility] - [facility] ([facility])

## 2023-05-05 NOTE — SUBJECTIVE & OBJECTIVE
Interval History: Pt feeling better today. Edema improving daily. Urine output not recorded accurately due to problems with purewick.     Review of Systems   Constitutional:  Negative for chills, fatigue and fever.   HENT:  Negative for congestion, dental problem, facial swelling, postnasal drip, rhinorrhea, sore throat and trouble swallowing.    Eyes:  Negative for photophobia and pain.   Respiratory:  Negative for cough, choking, chest tightness, shortness of breath and wheezing.    Cardiovascular:  Positive for leg swelling. Negative for chest pain.   Gastrointestinal:  Negative for abdominal distention, abdominal pain, diarrhea, nausea and vomiting.   Endocrine: Negative.    Genitourinary:  Negative for dysuria, flank pain, frequency and hematuria.   Musculoskeletal:  Negative for back pain.   Skin:  Negative for pallor and rash.   Allergic/Immunologic: Negative.    Neurological:  Negative for dizziness, syncope, weakness, numbness and headaches.   Psychiatric/Behavioral:  The patient is not nervous/anxious.    Objective:     Vital Signs (Most Recent):  Temp: 97.8 °F (36.6 °C) (05/05/23 1144)  Pulse: 87 (05/05/23 1144)  Resp: 12 (05/05/23 1144)  BP: (!) 107/54 (05/05/23 1144)  SpO2: 97 % (05/05/23 1144)   Vital Signs (24h Range):  Temp:  [97.8 °F (36.6 °C)-98.1 °F (36.7 °C)] 97.8 °F (36.6 °C)  Pulse:  [81-95] 87  Resp:  [12-18] 12  SpO2:  [95 %-100 %] 97 %  BP: (107-139)/(54-76) 107/54     Weight: 95.3 kg (210 lb)  Body mass index is 34.95 kg/m².    Intake/Output Summary (Last 24 hours) at 5/5/2023 1201  Last data filed at 5/5/2023 0600  Gross per 24 hour   Intake 743.09 ml   Output 1045 ml   Net -301.91 ml         Physical Exam  Vitals and nursing note reviewed.   Constitutional:       General: She is not in acute distress.     Appearance: Normal appearance. She is obese. She is not ill-appearing or diaphoretic.   HENT:      Head: Normocephalic and atraumatic.      Right Ear: External ear normal.      Left Ear:  External ear normal.      Nose: Nose normal.      Mouth/Throat:      Mouth: Mucous membranes are moist.      Pharynx: Oropharynx is clear.   Eyes:      General: No scleral icterus.     Extraocular Movements: Extraocular movements intact.      Conjunctiva/sclera: Conjunctivae normal.      Pupils: Pupils are equal, round, and reactive to light.   Cardiovascular:      Rate and Rhythm: Normal rate and regular rhythm.      Pulses: Normal pulses.      Heart sounds: Normal heart sounds. No murmur heard.  Pulmonary:      Effort: Pulmonary effort is normal. No respiratory distress.      Breath sounds: Normal breath sounds. No stridor. No wheezing or rhonchi.   Chest:      Chest wall: No tenderness.   Abdominal:      General: Abdomen is flat. There is no distension.      Palpations: Abdomen is soft.      Tenderness: There is no abdominal tenderness. There is no guarding or rebound.   Musculoskeletal:         General: Swelling present. No tenderness. Normal range of motion.      Cervical back: Normal range of motion and neck supple. No rigidity or tenderness.      Right lower leg: Edema present.      Left lower leg: Edema present.      Comments: Trigger finger R middle finger   Skin:     General: Skin is warm and dry.      Capillary Refill: Capillary refill takes less than 2 seconds.      Coloration: Skin is not jaundiced.      Findings: No erythema.   Neurological:      General: No focal deficit present.      Mental Status: She is alert and oriented to person, place, and time.      Cranial Nerves: No cranial nerve deficit.      Motor: No weakness.   Psychiatric:         Mood and Affect: Mood normal.         Behavior: Behavior normal.           Significant Labs: All pertinent labs within the past 24 hours have been reviewed.    Significant Imaging: I have reviewed all pertinent imaging results/findings within the past 24 hours.

## 2023-05-06 LAB
ANION GAP SERPL CALC-SCNC: 11 MMOL/L (ref 8–16)
BUN SERPL-MCNC: 57 MG/DL (ref 6–20)
CALCIUM SERPL-MCNC: 7.1 MG/DL (ref 8.7–10.5)
CHLORIDE SERPL-SCNC: 105 MMOL/L (ref 95–110)
CO2 SERPL-SCNC: 21 MMOL/L (ref 23–29)
CREAT SERPL-MCNC: 4.3 MG/DL (ref 0.5–1.4)
EST. GFR  (NO RACE VARIABLE): 11.5 ML/MIN/1.73 M^2
GLUCOSE SERPL-MCNC: 167 MG/DL (ref 70–110)
OSMOLALITY SERPL: 300 MOSM/KG (ref 275–295)
POCT GLUCOSE: 135 MG/DL (ref 70–110)
POCT GLUCOSE: 139 MG/DL (ref 70–110)
POCT GLUCOSE: 171 MG/DL (ref 70–110)
POCT GLUCOSE: 186 MG/DL (ref 70–110)
POTASSIUM SERPL-SCNC: 4.5 MMOL/L (ref 3.5–5.1)
SODIUM SERPL-SCNC: 137 MMOL/L (ref 136–145)

## 2023-05-06 PROCEDURE — 99232 SBSQ HOSP IP/OBS MODERATE 35: CPT | Mod: GC,,, | Performed by: INTERNAL MEDICINE

## 2023-05-06 PROCEDURE — 25000003 PHARM REV CODE 250: Performed by: STUDENT IN AN ORGANIZED HEALTH CARE EDUCATION/TRAINING PROGRAM

## 2023-05-06 PROCEDURE — 36415 COLL VENOUS BLD VENIPUNCTURE: CPT | Performed by: STUDENT IN AN ORGANIZED HEALTH CARE EDUCATION/TRAINING PROGRAM

## 2023-05-06 PROCEDURE — 80048 BASIC METABOLIC PNL TOTAL CA: CPT

## 2023-05-06 PROCEDURE — 21400001 HC TELEMETRY ROOM

## 2023-05-06 PROCEDURE — 99233 SBSQ HOSP IP/OBS HIGH 50: CPT | Mod: GC,,, | Performed by: HOSPITALIST

## 2023-05-06 PROCEDURE — 36415 COLL VENOUS BLD VENIPUNCTURE: CPT

## 2023-05-06 PROCEDURE — 63600175 PHARM REV CODE 636 W HCPCS

## 2023-05-06 PROCEDURE — 99232 PR SUBSEQUENT HOSPITAL CARE,LEVL II: ICD-10-PCS | Mod: GC,,, | Performed by: INTERNAL MEDICINE

## 2023-05-06 PROCEDURE — 99233 PR SUBSEQUENT HOSPITAL CARE,LEVL III: ICD-10-PCS | Mod: GC,,, | Performed by: HOSPITALIST

## 2023-05-06 PROCEDURE — 83930 ASSAY OF BLOOD OSMOLALITY: CPT | Performed by: STUDENT IN AN ORGANIZED HEALTH CARE EDUCATION/TRAINING PROGRAM

## 2023-05-06 PROCEDURE — 25000003 PHARM REV CODE 250

## 2023-05-06 RX ORDER — ACETAMINOPHEN 500 MG
1000 TABLET ORAL EVERY 6 HOURS PRN
Status: DISCONTINUED | OUTPATIENT
Start: 2023-05-06 | End: 2023-05-18

## 2023-05-06 RX ORDER — MORPHINE SULFATE 2 MG/ML
2 INJECTION, SOLUTION INTRAMUSCULAR; INTRAVENOUS ONCE
Status: COMPLETED | OUTPATIENT
Start: 2023-05-06 | End: 2023-05-06

## 2023-05-06 RX ADMIN — ACETAMINOPHEN 1000 MG: 500 TABLET ORAL at 08:05

## 2023-05-06 RX ADMIN — INSULIN ASPART 3 UNITS: 100 INJECTION, SOLUTION INTRAVENOUS; SUBCUTANEOUS at 05:05

## 2023-05-06 RX ADMIN — INSULIN DETEMIR 8 UNITS: 100 INJECTION, SOLUTION SUBCUTANEOUS at 08:05

## 2023-05-06 RX ADMIN — INSULIN ASPART 3 UNITS: 100 INJECTION, SOLUTION INTRAVENOUS; SUBCUTANEOUS at 10:05

## 2023-05-06 RX ADMIN — AMITRIPTYLINE HYDROCHLORIDE 50 MG: 25 TABLET, FILM COATED ORAL at 08:05

## 2023-05-06 RX ADMIN — CARVEDILOL 3.12 MG: 3.12 TABLET, FILM COATED ORAL at 05:05

## 2023-05-06 RX ADMIN — SODIUM BICARBONATE 1300 MG: 650 TABLET ORAL at 10:05

## 2023-05-06 RX ADMIN — CARVEDILOL 3.12 MG: 3.12 TABLET, FILM COATED ORAL at 10:05

## 2023-05-06 RX ADMIN — CLOPIDOGREL BISULFATE 75 MG: 75 TABLET ORAL at 10:05

## 2023-05-06 RX ADMIN — ISOSORBIDE DINITRATE: 20 TABLET ORAL at 03:05

## 2023-05-06 RX ADMIN — SERTRALINE HYDROCHLORIDE 50 MG: 50 TABLET ORAL at 10:05

## 2023-05-06 RX ADMIN — PANTOPRAZOLE SODIUM 40 MG: 40 TABLET, DELAYED RELEASE ORAL at 10:05

## 2023-05-06 RX ADMIN — ASPIRIN 81 MG: 81 TABLET, COATED ORAL at 10:05

## 2023-05-06 RX ADMIN — FUROSEMIDE 40 MG/HR: 10 INJECTION, SOLUTION INTRAMUSCULAR; INTRAVENOUS at 11:05

## 2023-05-06 RX ADMIN — SODIUM BICARBONATE 1300 MG: 650 TABLET ORAL at 03:05

## 2023-05-06 RX ADMIN — MORPHINE SULFATE 2 MG: 2 INJECTION, SOLUTION INTRAMUSCULAR; INTRAVENOUS at 07:05

## 2023-05-06 RX ADMIN — DOCUSATE SODIUM 100 MG: 100 CAPSULE, LIQUID FILLED ORAL at 08:05

## 2023-05-06 RX ADMIN — ATORVASTATIN CALCIUM 80 MG: 40 TABLET, FILM COATED ORAL at 10:05

## 2023-05-06 RX ADMIN — SODIUM BICARBONATE 1300 MG: 650 TABLET ORAL at 08:05

## 2023-05-06 RX ADMIN — ALLOPURINOL 100 MG: 100 TABLET ORAL at 10:05

## 2023-05-06 RX ADMIN — DOCUSATE SODIUM 100 MG: 100 CAPSULE, LIQUID FILLED ORAL at 10:05

## 2023-05-06 RX ADMIN — ISOSORBIDE DINITRATE: 20 TABLET ORAL at 10:05

## 2023-05-06 RX ADMIN — FUROSEMIDE 320 MG: 10 INJECTION, SOLUTION INTRAMUSCULAR; INTRAVENOUS at 10:05

## 2023-05-06 NOTE — PROGRESS NOTES
[facility] - Intensive Care ([address] Medicine  Progress Note    Patient Name: Suyapa Connelly  MRN: [id number]  Patient Class: IP- Inpatient   Admission Date: 5/1/2023  Length of Stay: 3 days  Attending Physician: Anna Lopez MD  Primary Care Provider: Magen Christensen MD        Subjective:     Principal Problem:Acute on chronic combined systolic and diastolic heart failure        HPI:  57 y.o. female with past medical history of CAD s/p CABG, CKD, DM, HTN, HLD, s/p bilateral AKA who presnets with chest pain, difficulty breathing, leg swelling.     Per ED She has been unable to access any of her medications for the last 3 months. Over the last few months she has noted worsening shortness of breath, particulatly orthopnea, and leg swelling. In the last two weeks she endorses associated abdominal pain and swelling, reduced appetite, and pleuritic chest pain with deep inspiration. She endorses occasional lighthededness with changes in position. Last week she had an episode where she was unable to breathe for about two minutes which was very upsetting for her and her , but she recovered and did not seek care at that time. Today, she complains of significant difficulty breathing, swelling in her legs and abdomen, and reduced appetite.     No headaches, visual changes, URI symptoms, palpitations, nausea, vomiting, diarrhea, blood in her stool, dysuria, hematuria, numbness or weakness in her extremities. She does endorse occasional paresthesias associated with her chronic phantom limb pain. She additionally has had increased difficulties with depression recently, denies suicidal or homicidal ideation, but has had considerable stress and low mood.      Of note, the patient saw a new primary care provider 4/21 for similar concerns who recommended she proceed to the ED at that time, but they were unable due to financial and geographic concerns. She lives two hours away from [facility] and her  cares  for her and elderly mother with dementia. She has had decreased access to medical care over the last year, and unable to access most of her prescriptions. She had a 90 day supply of four medications (lasix, statin, clopidogrel, sertraline) but these were accidentally lost and she has been unable to afford a replacement. They present today for evaluation and to reestablish care and access to her prescriptions.     Labs on admission significant for an elevated BNP, hypocalcemia which corrected is 8.3, significant elevation in Creatinine and a slight elevation of troponin    CXR  Cardiomegaly with pulmonary interstitial edema, suggestive of pulmonary edema secondary to CHF.      Overview/Hospital Course:  Diuresis initiated with Lasix IV.  1.2L UOP in the past 24 hours.  Vitals reassuring with adequate sats on ambient air.  Still very edematious; Cardiology recs lasix gtt.  GDMT initiated. Pt given 2mg morphine for moderate to severe pain in legs from swelling. Diuresis increased to 40mg/hr due to inadequate urine output. Cr continued to increase.       Interval History: See above; Pt still having pain in her legs from swelling.     Review of Systems   Constitutional:  Negative for chills, fatigue and fever.   HENT:  Negative for congestion, dental problem, facial swelling, postnasal drip, rhinorrhea, sore throat and trouble swallowing.    Eyes:  Negative for photophobia and pain.   Respiratory:  Negative for cough, choking, chest tightness, shortness of breath and wheezing.    Cardiovascular:  Positive for leg swelling. Negative for chest pain.   Gastrointestinal:  Negative for abdominal distention, abdominal pain, diarrhea, nausea and vomiting.   Endocrine: Negative.    Genitourinary:  Negative for dysuria, flank pain, frequency and hematuria.   Musculoskeletal:  Negative for back pain.   Skin:  Negative for pallor and rash.   Allergic/Immunologic: Negative.    Neurological:  Negative for dizziness, syncope,  weakness, numbness and headaches.   Psychiatric/Behavioral:  The patient is not nervous/anxious.    Objective:     Vital Signs (Most Recent):  Temp: 98.2 °F (36.8 °C) (05/06/23 1200)  Pulse: 89 (05/06/23 1457)  Resp: 18 (05/06/23 1200)  BP: (!) 130/55 (05/06/23 1200)  SpO2: 95 % (05/06/23 1200) Vital Signs (24h Range):  Temp:  [97.9 °F (36.6 °C)-98.3 °F (36.8 °C)] 98.2 °F (36.8 °C)  Pulse:  [89-98] 89  Resp:  [14-18] 18  SpO2:  [95 %-97 %] 95 %  BP: ()/(51-68) 130/55     Weight: 95.3 kg (210 lb)  Body mass index is 34.95 kg/m².    Intake/Output Summary (Last 24 hours) at 5/6/2023 1530  Last data filed at 5/6/2023 1100  Gross per 24 hour   Intake --   Output 1100 ml   Net -1100 ml         Physical Exam  Vitals and nursing note reviewed.   Constitutional:       General: She is not in acute distress.     Appearance: Normal appearance. She is obese. She is not ill-appearing or diaphoretic.   HENT:      Head: Normocephalic and atraumatic.      Right Ear: External ear normal.      Left Ear: External ear normal.      Nose: Nose normal.      Mouth/Throat:      Mouth: Mucous membranes are moist.      Pharynx: Oropharynx is clear.   Eyes:      General: No scleral icterus.     Extraocular Movements: Extraocular movements intact.      Conjunctiva/sclera: Conjunctivae normal.      Pupils: Pupils are equal, round, and reactive to light.   Cardiovascular:      Rate and Rhythm: Normal rate and regular rhythm.      Pulses: Normal pulses.      Heart sounds: Normal heart sounds. No murmur heard.  Pulmonary:      Effort: Pulmonary effort is normal. No respiratory distress.      Breath sounds: Normal breath sounds. No stridor. No wheezing or rhonchi.   Chest:      Chest wall: No tenderness.   Abdominal:      General: Abdomen is flat. There is no distension.      Palpations: Abdomen is soft.      Tenderness: There is no abdominal tenderness. There is no guarding or rebound.   Musculoskeletal:         General: Swelling present. No  tenderness. Normal range of motion.      Cervical back: Normal range of motion and neck supple. No rigidity or tenderness.      Right lower leg: Edema present.      Left lower leg: Edema present.      Comments: Trigger finger R middle finger   Skin:     General: Skin is warm and dry.      Capillary Refill: Capillary refill takes less than 2 seconds.      Coloration: Skin is not jaundiced.      Findings: No erythema.   Neurological:      General: No focal deficit present.      Mental Status: She is alert and oriented to person, place, and time.      Cranial Nerves: No cranial nerve deficit.      Motor: No weakness.   Psychiatric:         Mood and Affect: Mood normal.         Behavior: Behavior normal.           Significant Labs: All pertinent labs within the past 24 hours have been reviewed.    Significant Imaging: I have reviewed all pertinent imaging results/findings within the past 24 hours.      Assessment/Plan:      * Acute on chronic combined systolic and diastolic heart failure  Last Echo 6/21 showing     The estimated ejection fraction is 55%.   The left ventricle is normal in size with normal systolic function.   Normal left ventricular diastolic function.   Normal right ventricular size with normal right ventricular systolic function.   Mild tricuspid regurgitation.   The estimated PA systolic pressure is 31 mmHg.   Normal central venous pressure (3 mmHg).    Echo    Result Date: 5/2/2023  · The left ventricle is mildly enlarged with severely decreased systolic   function. The estimated ejection fraction is 15%.  · There is severe left ventricular global hypokinesis.  · Normal right ventricular size with low normal right ventricular systolic   function.  · Grade I left ventricular diastolic dysfunction.  · Biatrial enlargement.  · Mild-to-moderate mitral regurgitation.  · Severe tricuspid regurgitation.  · The estimated PA systolic pressure is 43 mmHg.  · Elevated central venous pressure (15 mmHg).            -IV Diuresis  -Qq4 BMPs with electrolyte repletion as needed; goal K>4, Mg>2  -Cardiology consult for acute decompensated heart failure with reduced EF   -Transition to lasix 40/hr for daily goal of net negative 2-3L  - Inpatient vs outpatient stress testing once patient euvolemic  - Strict I/Os with daily weights  - 1.5L fluid restriction, low Na diet  - recommend starting GDMT when patient euvolemic and renal function improves  -Tylenol 1g up to 3 times a day        ROSLYN (acute kidney injury)  Pt with increased Cr to 3.9 from 2.0 baseline. Possibly due to renovascular congestion from volume overload.     -Kerns  -Strict Is/Os  -Avoid nephrotoxic agents  -Lasix 40mg/hr  -Daily CMPs        Acute hypoxemic respiratory failure  See Acute on chronic CHF    CKD (chronic kidney disease), stage IV  History of CKD. See ROSLYN.      Above-knee amputation of right lower extremity  Prior history consistent with exam      Uses self-applied continuous glucose monitoring device  POCT glucose and daily CMPs    -Restart insulin for hyperglycemia      Dermatitis associated with moisture  Large body habitus with dermatitis to folds. Will monitor for infection.    -Zinc based powder  -Topical abx if indicated      Status post above knee amputation, left  History of.      Hypoalbuminemia  Pt with poor PO intake reported. Pt eats one meal a day and has associated nausea and vomiting. Will try antiemetics before feeds to improve intake. Will monitor daily intake and calorie replacement.     -Calorie count  -Diabetic/Renal diet  -Antiemetics before meals; Zofran 4 IV PRN  -Monitor Qtc for prolongation with antiemetics      Impaired functional mobility and endurance  PT/OT for bedbound patient      Paroxysmal atrial fibrillation  Continue home rate control  NSR on EKG        Anemia  Asymptomatic. Higher than 8 months ago. No signs of acute bleed at this time. Haptoglobin elevated.    -CBC daily  -Transfuse if <7        S/P CABG x  1  History of. Sternal scar well healed.       CAD (coronary artery disease)  History of CAD with retrosternal chest pain. EKG negative to ST changes. Nonspecific T wave inversions.     -Cardiac tele  -EKG prn  -Bidil per Cards recs      CAROLIN (generalized anxiety disorder)  Continue home anxiety medications      Essential hypertension  Continue home antihypertensives      PAD (peripheral artery disease)  History of bilateral AKA.           VTE Risk Mitigation (From admission, onward)    None          Discharge Planning   DEVAN: 5/8/2023     Code Status: Full Code   Is the patient medically ready for discharge?: No    Reason for patient still in hospital (select all that apply): Patient trending condition and Treatment  Discharge Plan A: Home Health   Discharge Delays: None known at this time      Onur Bueno MD  Department of Hospital Medicine   [facility] - Intensive Care ([address])

## 2023-05-06 NOTE — ASSESSMENT & PLAN NOTE
"Per chart review from notes in 03/2021, hx of Afib "post op after CABG. Oral amiodarone discontinued and not on NOAC". Per Cardiology eval 03/2021, decision to not continue NOAC as it was likely provoked.     EKG reviewed, and without evidence of Afib recurrence    Currently in normal sinus rhythm  "

## 2023-05-06 NOTE — PLAN OF CARE
Problem: Adult Inpatient Plan of Care  Goal: Plan of Care Review  Outcome: Ongoing, Progressing     Problem: Fluid and Electrolyte Imbalance (Acute Kidney Injury/Impairment)  Goal: Fluid and Electrolyte Balance  Outcome: Ongoing, Progressing     Problem: Oral Intake Inadequate (Acute Kidney Injury/Impairment)  Goal: Optimal Nutrition Intake  Outcome: Ongoing, Progressing     Problem: Renal Function Impairment (Acute Kidney Injury/Impairment)  Goal: Effective Renal Function  Outcome: Ongoing, Progressing    Pt AOx4. No acute events noted during shift. Vitals remained stable. No c/o pain or discomfort noted, scheduled meds given per MD orders. Q1-2hr safety checks and turns completed. Bed remained low, locked, with all personal items in reach.

## 2023-05-06 NOTE — PLAN OF CARE
Problem: Adult Inpatient Plan of Care  Goal: Plan of Care Review  5/6/2023 0437 by Caprice Guevara LPN  Outcome: Ongoing, Progressing  5/6/2023 0435 by Caprice Guevara LPN  Outcome: Ongoing, Progressing     Problem: Fluid and Electrolyte Imbalance (Acute Kidney Injury/Impairment)  Goal: Fluid and Electrolyte Balance  5/6/2023 0437 by Caprice Guevara LPN  Outcome: Ongoing, Progressing  5/6/2023 0435 by Caprice Guevara LPN  Outcome: Ongoing, Progressing     Problem: Oral Intake Inadequate (Acute Kidney Injury/Impairment)  Goal: Optimal Nutrition Intake  5/6/2023 0437 by Caprice Guevara LPN  Outcome: Ongoing, Progressing  5/6/2023 0435 by Caprice Guevara LPN  Outcome: Ongoing, Progressing     Problem: Skin Injury Risk Increased  Goal: Skin Health and Integrity  5/6/2023 0437 by Caprice Guevara LPN  Outcome: Ongoing, Progressing  5/6/2023 0435 by Caprice Guevara LPN  Outcome: Ongoing, Progressing    Pt AOx4. No acute events noted during shift. Vitals remained stable. Had c/o pain and discomfort, prn meds given per MD orders. Q1-2hr safety checks and turns completed. Bed remained low, locked, with all personal items in reach.

## 2023-05-06 NOTE — ASSESSMENT & PLAN NOTE
Last Echo 6/21 showing     The estimated ejection fraction is 55%.   The left ventricle is normal in size with normal systolic function.   Normal left ventricular diastolic function.   Normal right ventricular size with normal right ventricular systolic function.   Mild tricuspid regurgitation.   The estimated PA systolic pressure is 31 mmHg.   Normal central venous pressure (3 mmHg).    Echo    Result Date: 5/2/2023  · The left ventricle is mildly enlarged with severely decreased systolic   function. The estimated ejection fraction is 15%.  · There is severe left ventricular global hypokinesis.  · Normal right ventricular size with low normal right ventricular systolic   function.  · Grade I left ventricular diastolic dysfunction.  · Biatrial enlargement.  · Mild-to-moderate mitral regurgitation.  · Severe tricuspid regurgitation.  · The estimated PA systolic pressure is 43 mmHg.  · Elevated central venous pressure (15 mmHg).           -IV Diuresis  -Qq4 BMPs with electrolyte repletion as needed; goal K>4, Mg>2  -Cardiology consult for acute decompensated heart failure with reduced EF   -Transition to lasix 40/hr for daily goal of net negative 2-3L  - Inpatient vs outpatient stress testing once patient euvolemic  - Strict I/Os with daily weights  - 1.5L fluid restriction, low Na diet  - recommend starting GDMT when patient euvolemic and renal function improves  -Tylenol 1g up to 3 times a day

## 2023-05-06 NOTE — SUBJECTIVE & OBJECTIVE
Interval History: See above; Pt still having pain in her legs from swelling.     Review of Systems   Constitutional:  Negative for chills, fatigue and fever.   HENT:  Negative for congestion, dental problem, facial swelling, postnasal drip, rhinorrhea, sore throat and trouble swallowing.    Eyes:  Negative for photophobia and pain.   Respiratory:  Negative for cough, choking, chest tightness, shortness of breath and wheezing.    Cardiovascular:  Positive for leg swelling. Negative for chest pain.   Gastrointestinal:  Negative for abdominal distention, abdominal pain, diarrhea, nausea and vomiting.   Endocrine: Negative.    Genitourinary:  Negative for dysuria, flank pain, frequency and hematuria.   Musculoskeletal:  Negative for back pain.   Skin:  Negative for pallor and rash.   Allergic/Immunologic: Negative.    Neurological:  Negative for dizziness, syncope, weakness, numbness and headaches.   Psychiatric/Behavioral:  The patient is not nervous/anxious.    Objective:     Vital Signs (Most Recent):  Temp: 98.2 °F (36.8 °C) (05/06/23 1200)  Pulse: 89 (05/06/23 1457)  Resp: 18 (05/06/23 1200)  BP: (!) 130/55 (05/06/23 1200)  SpO2: 95 % (05/06/23 1200) Vital Signs (24h Range):  Temp:  [97.9 °F (36.6 °C)-98.3 °F (36.8 °C)] 98.2 °F (36.8 °C)  Pulse:  [89-98] 89  Resp:  [14-18] 18  SpO2:  [95 %-97 %] 95 %  BP: ()/(51-68) 130/55     Weight: 95.3 kg (210 lb)  Body mass index is 34.95 kg/m².    Intake/Output Summary (Last 24 hours) at 5/6/2023 1530  Last data filed at 5/6/2023 1100  Gross per 24 hour   Intake --   Output 1100 ml   Net -1100 ml         Physical Exam  Vitals and nursing note reviewed.   Constitutional:       General: She is not in acute distress.     Appearance: Normal appearance. She is obese. She is not ill-appearing or diaphoretic.   HENT:      Head: Normocephalic and atraumatic.      Right Ear: External ear normal.      Left Ear: External ear normal.      Nose: Nose normal.      Mouth/Throat:       Mouth: Mucous membranes are moist.      Pharynx: Oropharynx is clear.   Eyes:      General: No scleral icterus.     Extraocular Movements: Extraocular movements intact.      Conjunctiva/sclera: Conjunctivae normal.      Pupils: Pupils are equal, round, and reactive to light.   Cardiovascular:      Rate and Rhythm: Normal rate and regular rhythm.      Pulses: Normal pulses.      Heart sounds: Normal heart sounds. No murmur heard.  Pulmonary:      Effort: Pulmonary effort is normal. No respiratory distress.      Breath sounds: Normal breath sounds. No stridor. No wheezing or rhonchi.   Chest:      Chest wall: No tenderness.   Abdominal:      General: Abdomen is flat. There is no distension.      Palpations: Abdomen is soft.      Tenderness: There is no abdominal tenderness. There is no guarding or rebound.   Musculoskeletal:         General: Swelling present. No tenderness. Normal range of motion.      Cervical back: Normal range of motion and neck supple. No rigidity or tenderness.      Right lower leg: Edema present.      Left lower leg: Edema present.      Comments: Trigger finger R middle finger   Skin:     General: Skin is warm and dry.      Capillary Refill: Capillary refill takes less than 2 seconds.      Coloration: Skin is not jaundiced.      Findings: No erythema.   Neurological:      General: No focal deficit present.      Mental Status: She is alert and oriented to person, place, and time.      Cranial Nerves: No cranial nerve deficit.      Motor: No weakness.   Psychiatric:         Mood and Affect: Mood normal.         Behavior: Behavior normal.           Significant Labs: All pertinent labs within the past 24 hours have been reviewed.    Significant Imaging: I have reviewed all pertinent imaging results/findings within the past 24 hours.

## 2023-05-06 NOTE — PROGRESS NOTES
[facility] ([place] [id number])  Cardiology  Progress Note    Patient Name: Suyapa Connelly  MRN: [id number]  Admission Date: 5/1/2023  Hospital Length of Stay: 3 days  Code Status: Full Code   Attending Physician: Anna Lopez MD   Primary Care Physician: Magen Christensen MD  Expected Discharge Date: 5/8/2023  Principal Problem:Acute on chronic combined systolic and diastolic heart failure    Subjective:   Interval History: No acute events overnight, afebrile, hemodynamically stable. Diuresed on Furosemide 20mg/hr overnight with plans to increase to 40mg/hr this morning.       Review of Systems   Cardiovascular:  Positive for dyspnea on exertion and orthopnea. Negative for chest pain.   Gastrointestinal:  Positive for bloating. Negative for nausea and vomiting.   Genitourinary:  Negative for dysuria and frequency.   Objective:     Vital Signs (Most Recent):  Temp: 98.3 °F (36.8 °C) (05/06/23 0756)  Pulse: 98 (05/06/23 0756)  Resp: 18 (05/06/23 0756)  BP: 112/68 (05/06/23 0756)  SpO2: 95 % (05/06/23 0756) Vital Signs (24h Range):  Temp:  [97.8 °F (36.6 °C)-98.3 °F (36.8 °C)] 98.3 °F (36.8 °C)  Pulse:  [83-98] 98  Resp:  [12-18] 18  SpO2:  [95 %-97 %] 95 %  BP: ()/(51-68) 112/68       Weight: 95.3 kg (210 lb)  Body mass index is 34.95 kg/m².     SpO2: 95 %         Intake/Output Summary (Last 24 hours) at 5/6/2023 1103  Last data filed at 5/6/2023 0447  Gross per 24 hour   Intake --   Output 700 ml   Net -700 ml       Lines/Drains/Airways       Drain  Duration             Female External Urinary Catheter 05/03/23 0805 3 days              Peripheral Intravenous Line  Duration                  Peripheral IV - Single Lumen 05/05/23 1840 20 G;1 3/4 in Right;Anterior Upper Arm <1 day                       Physical Exam  Vitals and nursing note reviewed.   Constitutional:       General: She is not in acute distress.     Appearance: She is obese. She is not ill-appearing, toxic-appearing or diaphoretic.    HENT:      Head: Normocephalic and atraumatic.      Mouth/Throat:      Mouth: Mucous membranes are moist.   Eyes:      General: No scleral icterus.        Right eye: No discharge.         Left eye: No discharge.   Neck:      Vascular: JVD present.   Cardiovascular:      Rate and Rhythm: Normal rate and regular rhythm.   Pulmonary:      Effort: Pulmonary effort is normal. No respiratory distress.      Breath sounds: No wheezing.   Abdominal:      General: Bowel sounds are normal. There is distension.      Palpations: Abdomen is soft.      Tenderness: There is no abdominal tenderness. There is no guarding.   Musculoskeletal:      Cervical back: Normal range of motion and neck supple.      Right lower leg: Edema present.      Left lower leg: Edema present.      Comments: Patient with bilateral AKA. Edema of thighs   Skin:     General: Skin is warm.      Coloration: Skin is not jaundiced.      Findings: No bruising.   Neurological:      Mental Status: She is alert and oriented to person, place, and time. Mental status is at baseline.   Psychiatric:         Mood and Affect: Mood normal.         Behavior: Behavior normal.            LABS:  Recent Labs   Lab 05/05/23  0938 05/05/23  1737 05/06/23  0535    138 137   K 4.4 4.4 4.5    107 105   CO2 21* 21* 21*   BUN 59* 54* 57*   CREATININE 4.2* 4.0* 4.3*   * 174* 167*   ANIONGAP 13 10 11     Recent Labs   Lab 05/02/23 0449 05/03/23  0248 05/04/23  0317 05/04/23  1846 05/05/23  0516   MG 1.8 1.9 1.8 1.7 1.9   PHOS 6.5* 5.9* 5.5*  --   --      Recent Labs   Lab 05/02/23  1924 05/03/23  0849 05/03/23  1952   AST 27 22 26   ALT 36 31 30   ALKPHOS 218* 219* 237*   BILITOT 0.3 0.4 0.3   ALBUMIN 2.0* 2.2* 2.2*      Recent Labs   Lab 05/02/23  0449 05/03/23  0248 05/04/23  0317   WBC 5.47 5.37 5.39   HGB 9.9* 10.5* 10.4*   HCT 33.8* 35.0* 34.6*    293 307   GRAN 59.5  3.3 63.7  3.4 64.9  3.5       Recent Labs   Lab 05/01/23  1602 05/01/23  1818    TROPONINI 0.051* 0.048*     Lab Results   Component Value Date    BNP 1,063 (H) 05/01/2023     (H) 03/26/2022    BNP 51 03/11/2022            IMAGING:  EKGs:  Results for orders placed or performed during the hospital encounter of 05/01/23   EKG 12-lead    Collection Time: 05/01/23  3:38 PM    Narrative    Test Reason : R07.9,    Vent. Rate : 103 BPM     Atrial Rate : 103 BPM     P-R Int : 152 ms          QRS Dur : 078 ms      QT Int : 372 ms       P-R-T Axes : 016 039 189 degrees     QTc Int : 487 ms    Sinus tachycardia  Low anterior forces  ST- T wave abnormality, consider inferolateral ischemia  Abnormal ECG  When compared with ECG of 10-AUG-2022 18:40,  QRS duration has decreased  ST no longer elevated in Anterior leads  T wave inversion now evident in Inferior leads  Confirmed by Roosevelt BARBER MD ([id number]) on 5/1/2023 8:26:34 PM    Referred By:             Confirmed By:Roosevelt BARBER MD         TTE:  05/02/23  Summary     The left ventricle is mildly enlarged with severely decreased systolic function. The estimated ejection fraction is 15%.   There is severe left ventricular global hypokinesis.   Normal right ventricular size with low normal right ventricular systolic function.   Grade I left ventricular diastolic dysfunction.   Biatrial enlargement.   Mild-to-moderate mitral regurgitation.   Severe tricuspid regurgitation.   The estimated PA systolic pressure is 43 mmHg.   Elevated central venous pressure (15 mmHg).  EF   Date Value Ref Range Status   05/02/2023 15 % Final   06/22/2021 55 % Final   04/21/2021 50 % Final       Prior Ischemic Evaluation[07/28/20]:    Coronary angiogram 07/28/20( Underwent DESx1  of mid circumflex 80-85% stenosis )     Left main:  No significant stenosis     Lad:  Severe proximal LAD stenosis.  Distal LAD gets supply from LIMA to LAD.  Lima to LAD is widely patent.  No significant stenosis noted after touchdown of LIMA to LAD.     Circumflex:  Mid circumflex 80-85%  stenosis     RCA:   in the mid RCA.  Distal RCA gets supply from well-developed collaterals from the left          Significant Imaging: I have reviewed all pertinent imaging results/findings within the past 24 hours.      INPATIENT MEDICATIONS:  Continuous Infusions:   furosemide (LASIX) 10 mg/mL infusion (non-titrating)       Scheduled Meds:   allopurinoL  100 mg Oral Daily    amitriptyline  50 mg Oral QHS    aspirin  81 mg Oral Daily    atorvastatin  80 mg Oral Daily    carvediloL  3.125 mg Oral BID WM    clopidogreL  75 mg Oral Daily    docusate sodium  100 mg Oral BID    furosemide (LASIX) injection  320 mg Intravenous Once    hydrALAZINE-hydrALAZINE-isorsorbide dinitrate (BIDIL 20/37.5) combination 1 tablet   Oral TID    insulin aspart U-100  3 Units Subcutaneous TIDWM    insulin detemir U-100  8 Units Subcutaneous QHS    pantoprazole  40 mg Oral Daily    sertraline  50 mg Oral Daily    sodium bicarbonate  1,300 mg Oral TID     PRN Meds:acetaminophen, dextrose 10%, dextrose 10%, dextrose, dextrose, glucagon (human recombinant), insulin aspart U-100, melatonin, naloxone, sodium chloride 0.9%        Assessment and Plan:       * Acute on chronic combined systolic and diastolic heart failure  57 y.o F w/ hx of CAD s/p CABG LIMA-LAD 01/2020 & PCI  DESx1 to MidCx), T2DM, diastolic and systolic heart failure(EF15%), PAD s/p RSFA and pop intervention 10/2021, PAF on Eliquis, HTN, HLD here with volume overload in the setting of medication nonadherence    Patient with significant cardiovascular history presenting for ADHF with newly depressed EF  Patient not on GDMT  Takes lasix 40mg daily    TTE 15% (previously 55%), biatrial enlargement, CVP 15, PASP 43, severe tricuspid regurgitation, not noted on prior in 2021  .2, HgA1c 6.2      Intake/Output Summary (Last 24 hours) at 5/6/2023 0742  Last data filed at 5/6/2023 0447  Gross per 24 hour   Intake 222 ml   Output 700 ml   Net -478 ml     Net IO  "Since Admission: -3,205.91 mL [05/06/23 0742]    Significant extracellular volume with concerns for possible hypoalbuminemia    Recommendations:  - Furosemide 40 milligram/hour IV drip  - Continue hydralazine-isorsorbide dinitrate  - Strict I/Os, daily goal net negative 2-3L  -Will monitor diuresis over weekend, and consider RHC for volume evaluation on Monday  - ROSLYN workup per primary team  - BMP BID to assess for close electrolyte monitoring, goal K>4, Mg>2  - Inpatient stress testing once patient euvolemic  - 1.5L fluid restriction, low Na diet  - recommend starting GDMT when patient euvolemic and renal function improves    Paroxysmal atrial fibrillation  Per chart review from notes in 03/2021, hx of Afib "post op after CABG. Oral amiodarone discontinued and not on NOAC". Per Cardiology eval 03/2021, decision to not continue NOAC as it was likely provoked.     EKG reviewed, and without evidence of Afib recurrence    Currently in normal sinus rhythm      VTE Risk Mitigation (From admission, onward)    None          Bob Ordaz, DO  Cardiology  [facility] ([place] [address])  "

## 2023-05-06 NOTE — SUBJECTIVE & OBJECTIVE
Interval History: No acute events overnight, afebrile, hemodynamically stable. Diuresed on Furosemide 20mg/hr overnight with plans to increase to 40mg/hr this morning.       Review of Systems   Cardiovascular:  Positive for dyspnea on exertion and orthopnea. Negative for chest pain.   Gastrointestinal:  Positive for bloating. Negative for nausea and vomiting.   Genitourinary:  Negative for dysuria and frequency.   Objective:     Vital Signs (Most Recent):  Temp: 98.3 °F (36.8 °C) (05/06/23 0756)  Pulse: 98 (05/06/23 0756)  Resp: 18 (05/06/23 0756)  BP: 112/68 (05/06/23 0756)  SpO2: 95 % (05/06/23 0756) Vital Signs (24h Range):  Temp:  [97.8 °F (36.6 °C)-98.3 °F (36.8 °C)] 98.3 °F (36.8 °C)  Pulse:  [83-98] 98  Resp:  [12-18] 18  SpO2:  [95 %-97 %] 95 %  BP: ()/(51-68) 112/68       Weight: 95.3 kg (210 lb)  Body mass index is 34.95 kg/m².     SpO2: 95 %         Intake/Output Summary (Last 24 hours) at 5/6/2023 1103  Last data filed at 5/6/2023 0447  Gross per 24 hour   Intake --   Output 700 ml   Net -700 ml       Lines/Drains/Airways       Drain  Duration             Female External Urinary Catheter 05/03/23 0805 3 days              Peripheral Intravenous Line  Duration                  Peripheral IV - Single Lumen 05/05/23 1840 20 G;1 3/4 in Right;Anterior Upper Arm <1 day                       Physical Exam  Vitals and nursing note reviewed.   Constitutional:       General: She is not in acute distress.     Appearance: She is obese. She is not ill-appearing, toxic-appearing or diaphoretic.   HENT:      Head: Normocephalic and atraumatic.      Mouth/Throat:      Mouth: Mucous membranes are moist.   Eyes:      General: No scleral icterus.        Right eye: No discharge.         Left eye: No discharge.   Neck:      Vascular: JVD present.   Cardiovascular:      Rate and Rhythm: Normal rate and regular rhythm.   Pulmonary:      Effort: Pulmonary effort is normal. No respiratory distress.      Breath sounds: No  wheezing.   Abdominal:      General: Bowel sounds are normal. There is distension.      Palpations: Abdomen is soft.      Tenderness: There is no abdominal tenderness. There is no guarding.   Musculoskeletal:      Cervical back: Normal range of motion and neck supple.      Right lower leg: Edema present.      Left lower leg: Edema present.      Comments: Patient with bilateral AKA. Edema of thighs   Skin:     General: Skin is warm.      Coloration: Skin is not jaundiced.      Findings: No bruising.   Neurological:      Mental Status: She is alert and oriented to person, place, and time. Mental status is at baseline.   Psychiatric:         Mood and Affect: Mood normal.         Behavior: Behavior normal.            LABS:  Recent Labs   Lab 05/05/23  0938 05/05/23  1737 05/06/23  0535    138 137   K 4.4 4.4 4.5    107 105   CO2 21* 21* 21*   BUN 59* 54* 57*   CREATININE 4.2* 4.0* 4.3*   * 174* 167*   ANIONGAP 13 10 11     Recent Labs   Lab 05/02/23 0449 05/03/23 0248 05/04/23  0317 05/04/23  1846 05/05/23  0516   MG 1.8 1.9 1.8 1.7 1.9   PHOS 6.5* 5.9* 5.5*  --   --      Recent Labs   Lab 05/02/23  1924 05/03/23  0849 05/03/23 1952   AST 27 22 26   ALT 36 31 30   ALKPHOS 218* 219* 237*   BILITOT 0.3 0.4 0.3   ALBUMIN 2.0* 2.2* 2.2*      Recent Labs   Lab 05/02/23 0449 05/03/23 0248 05/04/23 0317   WBC 5.47 5.37 5.39   HGB 9.9* 10.5* 10.4*   HCT 33.8* 35.0* 34.6*    293 307   GRAN 59.5  3.3 63.7  3.4 64.9  3.5       Recent Labs   Lab 05/01/23  1602 05/01/23  1814   TROPONINI 0.051* 0.048*     Lab Results   Component Value Date    BNP 1,063 (H) 05/01/2023     (H) 03/26/2022    BNP 51 03/11/2022            IMAGING:  EKGs:  Results for orders placed or performed during the hospital encounter of 05/01/23   EKG 12-lead    Collection Time: 05/01/23  3:38 PM    Narrative    Test Reason : R07.9,    Vent. Rate : 103 BPM     Atrial Rate : 103 BPM     P-R Int : 152 ms          QRS Dur :  078 ms      QT Int : 372 ms       P-R-T Axes : 016 039 189 degrees     QTc Int : 487 ms    Sinus tachycardia  Low anterior forces  ST- T wave abnormality, consider inferolateral ischemia  Abnormal ECG  When compared with ECG of 10-AUG-2022 18:40,  QRS duration has decreased  ST no longer elevated in Anterior leads  T wave inversion now evident in Inferior leads  Confirmed by Roosevelt BARBER MD ([id number]) on 5/1/2023 8:26:34 PM    Referred By:             Confirmed By:Roosevelt BARBER MD         TTE:  05/02/23  Summary    The left ventricle is mildly enlarged with severely decreased systolic function. The estimated ejection fraction is 15%.  There is severe left ventricular global hypokinesis.  Normal right ventricular size with low normal right ventricular systolic function.  Grade I left ventricular diastolic dysfunction.  Biatrial enlargement.  Mild-to-moderate mitral regurgitation.  Severe tricuspid regurgitation.  The estimated PA systolic pressure is 43 mmHg.  Elevated central venous pressure (15 mmHg).  EF   Date Value Ref Range Status   05/02/2023 15 % Final   06/22/2021 55 % Final   04/21/2021 50 % Final       Prior Ischemic Evaluation[07/28/20]:    Coronary angiogram 07/28/20( Underwent DESx1  of mid circumflex 80-85% stenosis )     Left main:  No significant stenosis     Lad:  Severe proximal LAD stenosis.  Distal LAD gets supply from LIMA to LAD.  Lima to LAD is widely patent.  No significant stenosis noted after touchdown of LIMA to LAD.     Circumflex:  Mid circumflex 80-85% stenosis     RCA:   in the mid RCA.  Distal RCA gets supply from well-developed collaterals from the left          Significant Imaging: I have reviewed all pertinent imaging results/findings within the past 24 hours.      INPATIENT MEDICATIONS:  Continuous Infusions:   furosemide (LASIX) 10 mg/mL infusion (non-titrating)       Scheduled Meds:   allopurinoL  100 mg Oral Daily    amitriptyline  50 mg Oral QHS    aspirin  81 mg Oral Daily     atorvastatin  80 mg Oral Daily    carvediloL  3.125 mg Oral BID WM    clopidogreL  75 mg Oral Daily    docusate sodium  100 mg Oral BID    furosemide (LASIX) injection  320 mg Intravenous Once    hydrALAZINE-hydrALAZINE-isorsorbide dinitrate (BIDIL 20/37.5) combination 1 tablet   Oral TID    insulin aspart U-100  3 Units Subcutaneous TIDWM    insulin detemir U-100  8 Units Subcutaneous QHS    pantoprazole  40 mg Oral Daily    sertraline  50 mg Oral Daily    sodium bicarbonate  1,300 mg Oral TID     PRN Meds:acetaminophen, dextrose 10%, dextrose 10%, dextrose, dextrose, glucagon (human recombinant), insulin aspart U-100, melatonin, naloxone, sodium chloride 0.9%

## 2023-05-06 NOTE — ASSESSMENT & PLAN NOTE
Pt with increased Cr to 3.9 from 2.0 baseline. Possibly due to renovascular congestion from volume overload.     -Kerns  -Strict Is/Os  -Avoid nephrotoxic agents  -Lasix 40mg/hr  -Daily CMPs

## 2023-05-07 LAB
ANION GAP SERPL CALC-SCNC: 11 MMOL/L (ref 8–16)
BUN SERPL-MCNC: 62 MG/DL (ref 6–20)
CALCIUM SERPL-MCNC: 7 MG/DL (ref 8.7–10.5)
CHLORIDE SERPL-SCNC: 103 MMOL/L (ref 95–110)
CO2 SERPL-SCNC: 21 MMOL/L (ref 23–29)
CREAT SERPL-MCNC: 4.4 MG/DL (ref 0.5–1.4)
EST. GFR  (NO RACE VARIABLE): 11.2 ML/MIN/1.73 M^2
GLUCOSE SERPL-MCNC: 114 MG/DL (ref 70–110)
POCT GLUCOSE: 102 MG/DL (ref 70–110)
POCT GLUCOSE: 122 MG/DL (ref 70–110)
POCT GLUCOSE: 124 MG/DL (ref 70–110)
POCT GLUCOSE: 130 MG/DL (ref 70–110)
POTASSIUM SERPL-SCNC: 4.6 MMOL/L (ref 3.5–5.1)
SODIUM SERPL-SCNC: 135 MMOL/L (ref 136–145)

## 2023-05-07 PROCEDURE — 21400001 HC TELEMETRY ROOM

## 2023-05-07 PROCEDURE — 36415 COLL VENOUS BLD VENIPUNCTURE: CPT

## 2023-05-07 PROCEDURE — 97110 THERAPEUTIC EXERCISES: CPT

## 2023-05-07 PROCEDURE — 99233 PR SUBSEQUENT HOSPITAL CARE,LEVL III: ICD-10-PCS | Mod: GC,,, | Performed by: HOSPITALIST

## 2023-05-07 PROCEDURE — 97530 THERAPEUTIC ACTIVITIES: CPT

## 2023-05-07 PROCEDURE — 25000003 PHARM REV CODE 250: Performed by: STUDENT IN AN ORGANIZED HEALTH CARE EDUCATION/TRAINING PROGRAM

## 2023-05-07 PROCEDURE — 99232 SBSQ HOSP IP/OBS MODERATE 35: CPT | Mod: GC,,, | Performed by: INTERNAL MEDICINE

## 2023-05-07 PROCEDURE — 63600175 PHARM REV CODE 636 W HCPCS

## 2023-05-07 PROCEDURE — 63600175 PHARM REV CODE 636 W HCPCS: Performed by: STUDENT IN AN ORGANIZED HEALTH CARE EDUCATION/TRAINING PROGRAM

## 2023-05-07 PROCEDURE — 99233 SBSQ HOSP IP/OBS HIGH 50: CPT | Mod: GC,,, | Performed by: HOSPITALIST

## 2023-05-07 PROCEDURE — 80048 BASIC METABOLIC PNL TOTAL CA: CPT

## 2023-05-07 PROCEDURE — 25000003 PHARM REV CODE 250

## 2023-05-07 PROCEDURE — 99232 PR SUBSEQUENT HOSPITAL CARE,LEVL II: ICD-10-PCS | Mod: GC,,, | Performed by: INTERNAL MEDICINE

## 2023-05-07 RX ORDER — HEPARIN SODIUM 5000 [USP'U]/ML
5000 INJECTION, SOLUTION INTRAVENOUS; SUBCUTANEOUS EVERY 8 HOURS
Status: DISCONTINUED | OUTPATIENT
Start: 2023-05-07 | End: 2023-05-11

## 2023-05-07 RX ADMIN — HEPARIN SODIUM 5000 UNITS: 5000 INJECTION INTRAVENOUS; SUBCUTANEOUS at 09:05

## 2023-05-07 RX ADMIN — ALLOPURINOL 100 MG: 100 TABLET ORAL at 09:05

## 2023-05-07 RX ADMIN — CLOPIDOGREL BISULFATE 75 MG: 75 TABLET ORAL at 09:05

## 2023-05-07 RX ADMIN — DOCUSATE SODIUM 100 MG: 100 CAPSULE, LIQUID FILLED ORAL at 09:05

## 2023-05-07 RX ADMIN — CARVEDILOL 3.12 MG: 3.12 TABLET, FILM COATED ORAL at 06:05

## 2023-05-07 RX ADMIN — AMITRIPTYLINE HYDROCHLORIDE 50 MG: 25 TABLET, FILM COATED ORAL at 08:05

## 2023-05-07 RX ADMIN — CARVEDILOL 3.12 MG: 3.12 TABLET, FILM COATED ORAL at 09:05

## 2023-05-07 RX ADMIN — SODIUM BICARBONATE 1300 MG: 650 TABLET ORAL at 04:05

## 2023-05-07 RX ADMIN — ISOSORBIDE DINITRATE: 20 TABLET ORAL at 09:05

## 2023-05-07 RX ADMIN — INSULIN ASPART 3 UNITS: 100 INJECTION, SOLUTION INTRAVENOUS; SUBCUTANEOUS at 09:05

## 2023-05-07 RX ADMIN — PANTOPRAZOLE SODIUM 40 MG: 40 TABLET, DELAYED RELEASE ORAL at 09:05

## 2023-05-07 RX ADMIN — FUROSEMIDE 40 MG/HR: 10 INJECTION, SOLUTION INTRAMUSCULAR; INTRAVENOUS at 12:05

## 2023-05-07 RX ADMIN — SERTRALINE HYDROCHLORIDE 50 MG: 50 TABLET ORAL at 09:05

## 2023-05-07 RX ADMIN — SODIUM BICARBONATE 1300 MG: 650 TABLET ORAL at 09:05

## 2023-05-07 RX ADMIN — ASPIRIN 81 MG: 81 TABLET, COATED ORAL at 09:05

## 2023-05-07 RX ADMIN — ATORVASTATIN CALCIUM 80 MG: 40 TABLET, FILM COATED ORAL at 09:05

## 2023-05-07 RX ADMIN — ISOSORBIDE DINITRATE: 20 TABLET ORAL at 04:05

## 2023-05-07 RX ADMIN — FUROSEMIDE 40 MG/HR: 10 INJECTION, SOLUTION INTRAMUSCULAR; INTRAVENOUS at 11:05

## 2023-05-07 RX ADMIN — FUROSEMIDE 40 MG/HR: 10 INJECTION, SOLUTION INTRAMUSCULAR; INTRAVENOUS at 10:05

## 2023-05-07 RX ADMIN — SODIUM BICARBONATE 1300 MG: 650 TABLET ORAL at 08:05

## 2023-05-07 RX ADMIN — INSULIN DETEMIR 8 UNITS: 100 INJECTION, SOLUTION SUBCUTANEOUS at 09:05

## 2023-05-07 NOTE — PLAN OF CARE
Problem: Physical Therapy  Goal: Physical Therapy Goal  Description: Goals to be met by: 2023     Patient will increase functional independence with mobility by performin. Supine to sit with Minimal Assistance.  2. Sit to supine with Minimal Assistance.  3. Bed to chair transfer with Minimal Assistance using LRAD.  4. Wheelchair propulsion x50 feet with Minimal Assistance using bilateral upper extremities.  5. Spouse verbalizes and demonstrates understanding on family training with focus on transfers.     Outcome: Ongoing, Progressing

## 2023-05-07 NOTE — SUBJECTIVE & OBJECTIVE
Interval History: See above; diuresing well overnight.     Review of Systems   Constitutional:  Negative for chills, fatigue and fever.   HENT:  Negative for congestion, dental problem, facial swelling, postnasal drip, rhinorrhea, sore throat and trouble swallowing.    Eyes:  Negative for photophobia and pain.   Respiratory:  Negative for cough, choking, chest tightness, shortness of breath and wheezing.    Cardiovascular:  Positive for leg swelling. Negative for chest pain.   Gastrointestinal:  Negative for abdominal distention, abdominal pain, diarrhea, nausea and vomiting.   Endocrine: Negative.    Genitourinary:  Negative for dysuria, flank pain, frequency and hematuria.   Musculoskeletal:  Negative for back pain.   Skin:  Negative for pallor and rash.   Allergic/Immunologic: Negative.    Neurological:  Negative for dizziness, syncope, weakness, numbness and headaches.   Psychiatric/Behavioral:  The patient is not nervous/anxious.    Objective:     Vital Signs (Most Recent):  Temp: 98.3 °F (36.8 °C) (05/07/23 1125)  Pulse: 98 (05/07/23 1136)  Resp: 18 (05/07/23 1125)  BP: 116/66 (05/07/23 1125)  SpO2: 98 % (05/07/23 1125) Vital Signs (24h Range):  Temp:  [97.9 °F (36.6 °C)-98.3 °F (36.8 °C)] 98.3 °F (36.8 °C)  Pulse:  [89-99] 98  Resp:  [17-20] 18  SpO2:  [93 %-99 %] 98 %  BP: (103-135)/(51-82) 116/66     Weight: 95.3 kg (210 lb)  Body mass index is 34.95 kg/m².    Intake/Output Summary (Last 24 hours) at 5/7/2023 1437  Last data filed at 5/7/2023 0656  Gross per 24 hour   Intake --   Output 1500 ml   Net -1500 ml         Physical Exam  Vitals and nursing note reviewed.   Constitutional:       General: She is not in acute distress.     Appearance: Normal appearance. She is obese. She is not ill-appearing or diaphoretic.   HENT:      Head: Normocephalic and atraumatic.      Right Ear: External ear normal.      Left Ear: External ear normal.      Nose: Nose normal.      Mouth/Throat:      Mouth: Mucous membranes  are moist.      Pharynx: Oropharynx is clear.   Eyes:      General: No scleral icterus.     Extraocular Movements: Extraocular movements intact.      Conjunctiva/sclera: Conjunctivae normal.      Pupils: Pupils are equal, round, and reactive to light.   Cardiovascular:      Rate and Rhythm: Normal rate and regular rhythm.      Pulses: Normal pulses.      Heart sounds: Normal heart sounds. No murmur heard.  Pulmonary:      Effort: Pulmonary effort is normal. No respiratory distress.      Breath sounds: Normal breath sounds. No stridor. No wheezing or rhonchi.   Chest:      Chest wall: No tenderness.   Abdominal:      General: Abdomen is flat. There is no distension.      Palpations: Abdomen is soft.      Tenderness: There is no abdominal tenderness. There is no guarding or rebound.   Musculoskeletal:         General: Swelling present. No tenderness. Normal range of motion.      Cervical back: Normal range of motion and neck supple. No rigidity or tenderness.      Right lower leg: Edema present.      Left lower leg: Edema present.      Comments: Trigger finger R middle finger   Skin:     General: Skin is warm and dry.      Capillary Refill: Capillary refill takes less than 2 seconds.      Coloration: Skin is not jaundiced.      Findings: No erythema.   Neurological:      General: No focal deficit present.      Mental Status: She is alert and oriented to person, place, and time.      Cranial Nerves: No cranial nerve deficit.      Motor: No weakness.   Psychiatric:         Mood and Affect: Mood normal.         Behavior: Behavior normal.           Significant Labs: All pertinent labs within the past 24 hours have been reviewed.    Significant Imaging: I have reviewed all pertinent imaging results/findings within the past 24 hours.

## 2023-05-07 NOTE — PLAN OF CARE
Problem: Adult Inpatient Plan of Care  Goal: Plan of Care Review  Outcome: Ongoing, Progressing     Problem: Fluid and Electrolyte Imbalance (Acute Kidney Injury/Impairment)  Goal: Fluid and Electrolyte Balance  Outcome: Ongoing, Progressing     Problem: Oral Intake Inadequate (Acute Kidney Injury/Impairment)  Goal: Optimal Nutrition Intake  Outcome: Ongoing, Progressing     Problem: Renal Function Impairment (Acute Kidney Injury/Impairment)  Goal: Effective Renal Function  Outcome: Ongoing, Progressing

## 2023-05-07 NOTE — PROGRESS NOTES
[facility] ([place] [address])  Cardiology  Progress Note    Patient Name: Suyapa Connelly  MRN: [id number]  Admission Date: 5/1/2023  Hospital Length of Stay: 4 days  Code Status: Full Code   Attending Physician: Anna Lopez MD   Primary Care Physician: Magen Christensen MD  Expected Discharge Date: 5/8/2023  Principal Problem:Acute on chronic combined systolic and diastolic heart failure    Subjective:       Interval History: No acute events overnight, afebrile, hemodynamically stable. Diuresing on Furosemide 40mg/hr this morning      Review of Systems   Cardiovascular:  Positive for dyspnea on exertion, leg swelling and orthopnea. Negative for chest pain.   Respiratory:  Positive for shortness of breath. Negative for cough and sputum production.    Gastrointestinal:  Positive for bloating and nausea. Negative for vomiting.   Genitourinary:  Negative for dysuria and frequency.   Objective:     Vital Signs (Most Recent):  Temp: 98.3 °F (36.8 °C) (05/07/23 0736)  Pulse: 94 (05/07/23 0736)  Resp: 18 (05/07/23 0736)  BP: 135/82 (05/07/23 0736)  SpO2: (!) 93 % (05/07/23 0736) Vital Signs (24h Range):  Temp:  [97.9 °F (36.6 °C)-98.3 °F (36.8 °C)] 98.3 °F (36.8 °C)  Pulse:  [89-99] 94  Resp:  [17-20] 18  SpO2:  [93 %-99 %] 93 %  BP: (103-135)/(51-82) 135/82     Weight: 95.3 kg (210 lb)  Body mass index is 34.95 kg/m².     SpO2: (!) 93 %         Intake/Output Summary (Last 24 hours) at 5/7/2023 0808  Last data filed at 5/7/2023 0656  Gross per 24 hour   Intake --   Output 1900 ml   Net -1900 ml       Lines/Drains/Airways       Drain  Duration             Female External Urinary Catheter 05/03/23 0805 4 days              Peripheral Intravenous Line  Duration                  Peripheral IV - Single Lumen 05/05/23 1840 20 G;1 3/4 in Right;Anterior Upper Arm 1 day         Peripheral IV - Single Lumen 05/07/23 0400 22 G Anterior;Right Forearm <1 day                       Physical Exam  Vitals and nursing note  reviewed.   Constitutional:       General: She is not in acute distress.     Appearance: She is obese. She is not ill-appearing, toxic-appearing or diaphoretic.   HENT:      Head: Normocephalic and atraumatic.      Mouth/Throat:      Mouth: Mucous membranes are moist.   Eyes:      General: No scleral icterus.        Right eye: No discharge.         Left eye: No discharge.   Neck:      Vascular: JVD present.   Cardiovascular:      Rate and Rhythm: Normal rate and regular rhythm.   Pulmonary:      Effort: Pulmonary effort is normal. No respiratory distress.      Breath sounds: No wheezing.   Abdominal:      General: Bowel sounds are normal. There is distension.      Palpations: Abdomen is soft.      Tenderness: There is abdominal tenderness. There is no guarding.   Musculoskeletal:         General: Swelling (Abdominal, thigh and lower extremity edema) present.      Cervical back: No rigidity.      Right lower leg: Edema present.      Left lower leg: Edema present.      Comments: Patient with bilateral AKA. Edema of thighs   Skin:     General: Skin is warm.      Coloration: Skin is not jaundiced.   Neurological:      Mental Status: She is alert and oriented to person, place, and time. Mental status is at baseline.   Psychiatric:         Mood and Affect: Mood normal.         Behavior: Behavior normal.              LABS:  Recent Labs   Lab 05/05/23  1737 05/06/23  0535 05/07/23  0609    137 135*   K 4.4 4.5 4.6    105 103   CO2 21* 21* 21*   BUN 54* 57* 62*   CREATININE 4.0* 4.3* 4.4*   * 167* 114*   ANIONGAP 10 11 11     Recent Labs   Lab 05/02/23 0449 05/03/23  0248 05/04/23  0317 05/04/23  1846 05/05/23  0516   MG 1.8 1.9 1.8 1.7 1.9   PHOS 6.5* 5.9* 5.5*  --   --      Recent Labs   Lab 05/02/23  1924 05/03/23  0849 05/03/23  1952   AST 27 22 26   ALT 36 31 30   ALKPHOS 218* 219* 237*   BILITOT 0.3 0.4 0.3   ALBUMIN 2.0* 2.2* 2.2*      Recent Labs   Lab 05/02/23  0449 05/03/23  0248 05/04/23  0317    WBC 5.47 5.37 5.39   HGB 9.9* 10.5* 10.4*   HCT 33.8* 35.0* 34.6*    293 307   GRAN 59.5  3.3 63.7  3.4 64.9  3.5     No results for input(s): INR in the last 168 hours.  Recent Labs   Lab 05/01/23  1602 05/01/23  1814   TROPONINI 0.051* 0.048*     Lab Results   Component Value Date    BNP 1,063 (H) 05/01/2023     (H) 03/26/2022    BNP 51 03/11/2022            IMAGING:  EKGs:  Results for orders placed or performed during the hospital encounter of 05/01/23   EKG 12-lead    Collection Time: 05/01/23  3:38 PM    Narrative    Test Reason : R07.9,    Vent. Rate : 103 BPM     Atrial Rate : 103 BPM     P-R Int : 152 ms          QRS Dur : 078 ms      QT Int : 372 ms       P-R-T Axes : 016 039 189 degrees     QTc Int : 487 ms    Sinus tachycardia  Low anterior forces  ST- T wave abnormality, consider inferolateral ischemia  Abnormal ECG  When compared with ECG of 10-AUG-2022 18:40,  QRS duration has decreased  ST no longer elevated in Anterior leads  T wave inversion now evident in Inferior leads  Confirmed by Roosevelt BARBER MD ([id number]) on 5/1/2023 8:26:34 PM    Referred By:             Confirmed By:Roosevelt BARBER MD       TTE:  EF   Date Value Ref Range Status   05/02/2023 15 % Final   06/22/2021 55 % Final   04/21/2021 50 % Final       Significant Imaging: I have reviewed all pertinent imaging results/findings within the past 24 hours.      INPATIENT MEDICATIONS:  Continuous Infusions:   furosemide (LASIX) 10 mg/mL infusion (non-titrating) 40 mg/hr (05/07/23 0005)     Scheduled Meds:   allopurinoL  100 mg Oral Daily    amitriptyline  50 mg Oral QHS    aspirin  81 mg Oral Daily    atorvastatin  80 mg Oral Daily    carvediloL  3.125 mg Oral BID WM    clopidogreL  75 mg Oral Daily    docusate sodium  100 mg Oral BID    hydrALAZINE-hydrALAZINE-isorsorbide dinitrate (BIDIL 20/37.5) combination 1 tablet   Oral TID    insulin aspart U-100  3 Units Subcutaneous TIDWM    insulin detemir U-100  8 Units  Subcutaneous QHS    pantoprazole  40 mg Oral Daily    sertraline  50 mg Oral Daily    sodium bicarbonate  1,300 mg Oral TID     PRN Meds:acetaminophen, dextrose 10%, dextrose 10%, dextrose, dextrose, glucagon (human recombinant), insulin aspart U-100, melatonin, naloxone, sodium chloride 0.9%        Assessment and Plan:     * Acute on chronic combined systolic and diastolic heart failure  57 y.o F w/ hx of CAD s/p CABG LIMA-LAD 01/2020 & PCI  DESx1 to [facility]), T2DM, diastolic and systolic heart failure(EF15%), PAD s/p RSFA and pop intervention 10/2021, PAF, HTN, HLD here with volume overload in the setting of medication nonadherence    Patient with significant cardiovascular history presenting for ADHF with newly depressed EF  Patient not on GDMT  Takes lasix 40mg daily    TTE 15% (previously 55%), biatrial enlargement, CVP 15, PASP 43, severe tricuspid regurgitation, not noted on prior in 2021  .2, HgA1c 6.2      Intake/Output Summary (Last 24 hours) at 5/7/2023 0818  Last data filed at 5/7/2023 0656  Gross per 24 hour   Intake --   Output 1900 ml   Net -1900 ml     Net IO Since Admission: -5,105.91 mL [05/07/23 0818]    Significant extracellular volume with concerns for possible hypoalbuminemia    Recommendations:  - Continue Furosemide 40 milligram/hour IV drip  - Increase Bidil dose to isosorbide dinitrate 40mg-hydralazine 75 mg TID  - Strict I/Os, daily goal net negative 2-3L. Recommend transfer to cardiology floor nursing unit for close monitoring of I/Os, daily weights  -Will monitor diuresis over weekend, and consider RHC for volume evaluation on Monday vs. Tuesday based on I/Os. Hold anticoagulation for possible procedure  - ROSLYN workup per primary team  - BMP BID to assess for close electrolyte monitoring, goal K>4, Mg>2  - Inpatient stress testing once patient euvolemic  - 1.5L fluid restriction, low Na diet  - recommend starting GDMT when patient euvolemic and renal function  "improves    Paroxysmal atrial fibrillation  Per chart review from notes in 03/2021, hx of Afib "post op after CABG. Oral amiodarone discontinued and not on NOAC". Per Cardiology eval 03/2021, decision to not continue NOAC as it was likely provoked.     However, EKG from 01/2020 with Atrial Fibrillation while post-op from CABG while undergoing dialysis.      CHADSVASC score:5    PLAN  -Recommend systemic anticoagulation. Hold anticoagulation for now as patient is  pending possible RHC.  -Continue rate control with Carvedilol      VTE Risk Mitigation (From admission, onward)    None        Please see staff attestation for final recommendations    Bob Ordaz,   Cardiology  [facility] ([place] [address])  "

## 2023-05-07 NOTE — SUBJECTIVE & OBJECTIVE
Interval History: No acute events overnight, afebrile, hemodynamically stable. Diuresing on Furosemide 40mg/hr this morning      Review of Systems   Cardiovascular:  Positive for dyspnea on exertion, leg swelling and orthopnea. Negative for chest pain.   Respiratory:  Positive for shortness of breath. Negative for cough and sputum production.    Gastrointestinal:  Positive for bloating and nausea. Negative for vomiting.   Genitourinary:  Negative for dysuria and frequency.   Objective:     Vital Signs (Most Recent):  Temp: 98.3 °F (36.8 °C) (05/07/23 0736)  Pulse: 94 (05/07/23 0736)  Resp: 18 (05/07/23 0736)  BP: 135/82 (05/07/23 0736)  SpO2: (!) 93 % (05/07/23 0736) Vital Signs (24h Range):  Temp:  [97.9 °F (36.6 °C)-98.3 °F (36.8 °C)] 98.3 °F (36.8 °C)  Pulse:  [89-99] 94  Resp:  [17-20] 18  SpO2:  [93 %-99 %] 93 %  BP: (103-135)/(51-82) 135/82     Weight: 95.3 kg (210 lb)  Body mass index is 34.95 kg/m².     SpO2: (!) 93 %         Intake/Output Summary (Last 24 hours) at 5/7/2023 0808  Last data filed at 5/7/2023 0656  Gross per 24 hour   Intake --   Output 1900 ml   Net -1900 ml       Lines/Drains/Airways       Drain  Duration             Female External Urinary Catheter 05/03/23 0805 4 days              Peripheral Intravenous Line  Duration                  Peripheral IV - Single Lumen 05/05/23 1840 20 G;1 3/4 in Right;Anterior Upper Arm 1 day         Peripheral IV - Single Lumen 05/07/23 0400 22 G Anterior;Right Forearm <1 day                       Physical Exam  Vitals and nursing note reviewed.   Constitutional:       General: She is not in acute distress.     Appearance: She is obese. She is not ill-appearing, toxic-appearing or diaphoretic.   HENT:      Head: Normocephalic and atraumatic.      Mouth/Throat:      Mouth: Mucous membranes are moist.   Eyes:      General: No scleral icterus.        Right eye: No discharge.         Left eye: No discharge.   Neck:      Vascular: JVD present.   Cardiovascular:       Rate and Rhythm: Normal rate and regular rhythm.   Pulmonary:      Effort: Pulmonary effort is normal. No respiratory distress.      Breath sounds: No wheezing.   Abdominal:      General: Bowel sounds are normal. There is distension.      Palpations: Abdomen is soft.      Tenderness: There is abdominal tenderness. There is no guarding.   Musculoskeletal:         General: Swelling (Abdominal, thigh and lower extremity edema) present.      Cervical back: No rigidity.      Right lower leg: Edema present.      Left lower leg: Edema present.      Comments: Patient with bilateral AKA. Edema of thighs   Skin:     General: Skin is warm.      Coloration: Skin is not jaundiced.   Neurological:      Mental Status: She is alert and oriented to person, place, and time. Mental status is at baseline.   Psychiatric:         Mood and Affect: Mood normal.         Behavior: Behavior normal.              LABS:  Recent Labs   Lab 05/05/23  1737 05/06/23  0535 05/07/23  0609    137 135*   K 4.4 4.5 4.6    105 103   CO2 21* 21* 21*   BUN 54* 57* 62*   CREATININE 4.0* 4.3* 4.4*   * 167* 114*   ANIONGAP 10 11 11     Recent Labs   Lab 05/02/23  0449 05/03/23  0248 05/04/23  0317 05/04/23  1846 05/05/23  0516   MG 1.8 1.9 1.8 1.7 1.9   PHOS 6.5* 5.9* 5.5*  --   --      Recent Labs   Lab 05/02/23  1924 05/03/23  0849 05/03/23  1952   AST 27 22 26   ALT 36 31 30   ALKPHOS 218* 219* 237*   BILITOT 0.3 0.4 0.3   ALBUMIN 2.0* 2.2* 2.2*      Recent Labs   Lab 05/02/23  0449 05/03/23  0248 05/04/23  0317   WBC 5.47 5.37 5.39   HGB 9.9* 10.5* 10.4*   HCT 33.8* 35.0* 34.6*    293 307   GRAN 59.5  3.3 63.7  3.4 64.9  3.5     No results for input(s): INR in the last 168 hours.  Recent Labs   Lab 05/01/23  1602 05/01/23  1814   TROPONINI 0.051* 0.048*     Lab Results   Component Value Date    BNP 1,063 (H) 05/01/2023     (H) 03/26/2022    BNP 51 03/11/2022            IMAGING:  EKGs:  Results for orders placed or  performed during the hospital encounter of 05/01/23   EKG 12-lead    Collection Time: 05/01/23  3:38 PM    Narrative    Test Reason : R07.9,    Vent. Rate : 103 BPM     Atrial Rate : 103 BPM     P-R Int : 152 ms          QRS Dur : 078 ms      QT Int : 372 ms       P-R-T Axes : 016 039 189 degrees     QTc Int : 487 ms    Sinus tachycardia  Low anterior forces  ST- T wave abnormality, consider inferolateral ischemia  Abnormal ECG  When compared with ECG of 10-AUG-2022 18:40,  QRS duration has decreased  ST no longer elevated in Anterior leads  T wave inversion now evident in Inferior leads  Confirmed by Roosevelt BARBER MD ([id number]) on 5/1/2023 8:26:34 PM    Referred By:             Confirmed By:Roosevelt BARBER MD       TTE:  EF   Date Value Ref Range Status   05/02/2023 15 % Final   06/22/2021 55 % Final   04/21/2021 50 % Final       Significant Imaging: I have reviewed all pertinent imaging results/findings within the past 24 hours.      INPATIENT MEDICATIONS:  Continuous Infusions:   furosemide (LASIX) 10 mg/mL infusion (non-titrating) 40 mg/hr (05/07/23 0005)     Scheduled Meds:   allopurinoL  100 mg Oral Daily    amitriptyline  50 mg Oral QHS    aspirin  81 mg Oral Daily    atorvastatin  80 mg Oral Daily    carvediloL  3.125 mg Oral BID WM    clopidogreL  75 mg Oral Daily    docusate sodium  100 mg Oral BID    hydrALAZINE-hydrALAZINE-isorsorbide dinitrate (BIDIL 20/37.5) combination 1 tablet   Oral TID    insulin aspart U-100  3 Units Subcutaneous TIDWM    insulin detemir U-100  8 Units Subcutaneous QHS    pantoprazole  40 mg Oral Daily    sertraline  50 mg Oral Daily    sodium bicarbonate  1,300 mg Oral TID     PRN Meds:acetaminophen, dextrose 10%, dextrose 10%, dextrose, dextrose, glucagon (human recombinant), insulin aspart U-100, melatonin, naloxone, sodium chloride 0.9%

## 2023-05-07 NOTE — PROGRESS NOTES
[facility] - Int[facility] ([address] Medicine  Progress Note    Patient Name: Suyapa Connelly  MRN: [id number]  Patient Class: IP- Inpatient   Admission Date: 5/1/2023  Length of Stay: 4 days  Attending Physician: Anna Lopez MD  Primary Care Provider: Magen Christensen MD        Subjective:     Principal Problem:Acute on chronic combined systolic and diastolic heart failure        HPI:  57 y.o. female with past medical history of CAD s/p CABG, CKD, DM, HTN, HLD, s/p bilateral AKA who presnets with chest pain, difficulty breathing, leg swelling.     Per ED She has been unable to access any of her medications for the last 3 months. Over the last few months she has noted worsening shortness of breath, particulatly orthopnea, and leg swelling. In the last two weeks she endorses associated abdominal pain and swelling, reduced appetite, and pleuritic chest pain with deep inspiration. She endorses occasional lighthededness with changes in position. Last week she had an episode where she was unable to breathe for about two minutes which was very upsetting for her and her , but she recovered and did not seek care at that time. Today, she complains of significant difficulty breathing, swelling in her legs and abdomen, and reduced appetite.     No headaches, visual changes, URI symptoms, palpitations, nausea, vomiting, diarrhea, blood in her stool, dysuria, hematuria, numbness or weakness in her extremities. She does endorse occasional paresthesias associated with her chronic phantom limb pain. She additionally has had increased difficulties with depression recently, denies suicidal or homicidal ideation, but has had considerable stress and low mood.      Of note, the patient saw a new primary care provider 4/21 for similar concerns who recommended she proceed to the ED at that time, but they were unable due to financial and geographic concerns. She lives two hours away from [facility] and her  cares  for her and elderly mother with dementia. She has had decreased access to medical care over the last year, and unable to access most of her prescriptions. She had a 90 day supply of four medications (lasix, statin, clopidogrel, sertraline) but these were accidentally lost and she has been unable to afford a replacement. They present today for evaluation and to reestablish care and access to her prescriptions.     Labs on admission significant for an elevated BNP, hypocalcemia which corrected is 8.3, significant elevation in Creatinine and a slight elevation of troponin    CXR  Cardiomegaly with pulmonary interstitial edema, suggestive of pulmonary edema secondary to CHF.      Overview/Hospital Course:  Diuresis initiated with Lasix IV.  1.2L UOP in the past 24 hours.  Vitals reassuring with adequate sats on ambient air.  Still very edematious; Cardiology recs lasix gtt.  GDMT initiated. Pt given 2mg morphine for moderate to severe pain in legs from swelling. Diuresis increased to 40mg/hr due to inadequate urine output. Cr continued to increase. Cr stable at new Lasix rate. Diuresed 1.9L with some improvement in clinical status.      Interval History: See above; diuresing well overnight.     Review of Systems   Constitutional:  Negative for chills, fatigue and fever.   HENT:  Negative for congestion, dental problem, facial swelling, postnasal drip, rhinorrhea, sore throat and trouble swallowing.    Eyes:  Negative for photophobia and pain.   Respiratory:  Negative for cough, choking, chest tightness, shortness of breath and wheezing.    Cardiovascular:  Positive for leg swelling. Negative for chest pain.   Gastrointestinal:  Negative for abdominal distention, abdominal pain, diarrhea, nausea and vomiting.   Endocrine: Negative.    Genitourinary:  Negative for dysuria, flank pain, frequency and hematuria.   Musculoskeletal:  Negative for back pain.   Skin:  Negative for pallor and rash.   Allergic/Immunologic:  Negative.    Neurological:  Negative for dizziness, syncope, weakness, numbness and headaches.   Psychiatric/Behavioral:  The patient is not nervous/anxious.    Objective:     Vital Signs (Most Recent):  Temp: 98.3 °F (36.8 °C) (05/07/23 1125)  Pulse: 98 (05/07/23 1136)  Resp: 18 (05/07/23 1125)  BP: 116/66 (05/07/23 1125)  SpO2: 98 % (05/07/23 1125) Vital Signs (24h Range):  Temp:  [97.9 °F (36.6 °C)-98.3 °F (36.8 °C)] 98.3 °F (36.8 °C)  Pulse:  [89-99] 98  Resp:  [17-20] 18  SpO2:  [93 %-99 %] 98 %  BP: (103-135)/(51-82) 116/66     Weight: 95.3 kg (210 lb)  Body mass index is 34.95 kg/m².    Intake/Output Summary (Last 24 hours) at 5/7/2023 1437  Last data filed at 5/7/2023 0656  Gross per 24 hour   Intake --   Output 1500 ml   Net -1500 ml         Physical Exam  Vitals and nursing note reviewed.   Constitutional:       General: She is not in acute distress.     Appearance: Normal appearance. She is obese. She is not ill-appearing or diaphoretic.   HENT:      Head: Normocephalic and atraumatic.      Right Ear: External ear normal.      Left Ear: External ear normal.      Nose: Nose normal.      Mouth/Throat:      Mouth: Mucous membranes are moist.      Pharynx: Oropharynx is clear.   Eyes:      General: No scleral icterus.     Extraocular Movements: Extraocular movements intact.      Conjunctiva/sclera: Conjunctivae normal.      Pupils: Pupils are equal, round, and reactive to light.   Cardiovascular:      Rate and Rhythm: Normal rate and regular rhythm.      Pulses: Normal pulses.      Heart sounds: Normal heart sounds. No murmur heard.  Pulmonary:      Effort: Pulmonary effort is normal. No respiratory distress.      Breath sounds: Normal breath sounds. No stridor. No wheezing or rhonchi.   Chest:      Chest wall: No tenderness.   Abdominal:      General: Abdomen is flat. There is no distension.      Palpations: Abdomen is soft.      Tenderness: There is no abdominal tenderness. There is no guarding or rebound.    Musculoskeletal:         General: Swelling present. No tenderness. Normal range of motion.      Cervical back: Normal range of motion and neck supple. No rigidity or tenderness.      Right lower leg: Edema present.      Left lower leg: Edema present.      Comments: Trigger finger R middle finger   Skin:     General: Skin is warm and dry.      Capillary Refill: Capillary refill takes less than 2 seconds.      Coloration: Skin is not jaundiced.      Findings: No erythema.   Neurological:      General: No focal deficit present.      Mental Status: She is alert and oriented to person, place, and time.      Cranial Nerves: No cranial nerve deficit.      Motor: No weakness.   Psychiatric:         Mood and Affect: Mood normal.         Behavior: Behavior normal.           Significant Labs: All pertinent labs within the past 24 hours have been reviewed.    Significant Imaging: I have reviewed all pertinent imaging results/findings within the past 24 hours.      Assessment/Plan:      * Acute on chronic combined systolic and diastolic heart failure  Last Echo 6/21 showing     The estimated ejection fraction is 55%.   The left ventricle is normal in size with normal systolic function.   Normal left ventricular diastolic function.   Normal right ventricular size with normal right ventricular systolic function.   Mild tricuspid regurgitation.   The estimated PA systolic pressure is 31 mmHg.   Normal central venous pressure (3 mmHg).    Echo    Result Date: 5/2/2023  · The left ventricle is mildly enlarged with severely decreased systolic   function. The estimated ejection fraction is 15%.  · There is severe left ventricular global hypokinesis.  · Normal right ventricular size with low normal right ventricular systolic   function.  · Grade I left ventricular diastolic dysfunction.  · Biatrial enlargement.  · Mild-to-moderate mitral regurgitation.  · Severe tricuspid regurgitation.  · The estimated PA systolic pressure is  43 mmHg.  · Elevated central venous pressure (15 mmHg).           -IV Diuresis  -Qq4 BMPs with electrolyte repletion as needed; goal K>4, Mg>2  -Cardiology consult for acute decompensated heart failure with reduced EF   -Transition to lasix 40/hr for daily goal of net negative 2-3L  - Inpatient vs outpatient stress testing once patient euvolemic  - Strict I/Os with daily weights  - 1.5L fluid restriction, low Na diet  - recommend starting GDMT when patient euvolemic and renal function improves  -Tylenol 1g up to 3 times a day        ROSLYN (acute kidney injury)  Pt with increased Cr to 3.9 from 2.0 baseline. Possibly due to renovascular congestion from volume overload.     -Kerns  -Strict Is/Os  -Avoid nephrotoxic agents  -Lasix 40mg/hr  -Daily CMPs        Acute hypoxemic respiratory failure  See Acute on chronic CHF    CKD (chronic kidney disease), stage IV  History of CKD. See ROSLYN.      Above-knee amputation of right lower extremity  Prior history consistent with exam      Uses self-applied continuous glucose monitoring device  POCT glucose and daily CMPs    -Restart insulin for hyperglycemia      Dermatitis associated with moisture  Large body habitus with dermatitis to folds. Will monitor for infection.    -Zinc based powder  -Topical abx if indicated      Status post above knee amputation, left  History of.      Hypoalbuminemia  Pt with poor PO intake reported. Pt eats one meal a day and has associated nausea and vomiting. Will try antiemetics before feeds to improve intake. Will monitor daily intake and calorie replacement.     -Calorie count  -Diabetic/Renal diet  -Antiemetics before meals; Zofran 4 IV PRN  -Monitor Qtc for prolongation with antiemetics      Impaired functional mobility and endurance  PT/OT for bedbound patient      Paroxysmal atrial fibrillation  Continue home rate control  NSR on EKG        Anemia  Asymptomatic. Higher than 8 months ago. No signs of acute bleed at this time. Haptoglobin  elevated.    -CBC daily  -Transfuse if <7        S/P CABG x 1  History of. Sternal scar well healed.       CAD (coronary artery disease)  History of CAD with retrosternal chest pain. EKG negative to ST changes. Nonspecific T wave inversions.     -Cardiac tele  -EKG prn  -Bidil per Cards recs      CAROLIN (generalized anxiety disorder)  Continue home anxiety medications      Essential hypertension  Continue home antihypertensives      PAD (peripheral artery disease)  History of bilateral AKA.           VTE Risk Mitigation (From admission, onward)    None          Discharge Planning   DEVAN: 5/8/2023     Code Status: Full Code   Is the patient medically ready for discharge?: No    Reason for patient still in hospital (select all that apply): Patient trending condition, Treatment and Consult recommendations  Discharge Plan A: Home Health   Discharge Delays: None known at this time      Onur Bueno MD  Department of Hospital Medicine   [facility] - Int[facility] ([place]-[facility])

## 2023-05-07 NOTE — PT/OT/SLP PROGRESS
Physical Therapy Treatment    Patient Name:  Suyapa Connelly   MRN:  [id number]    Recommendations:     Discharge Recommendations: other (see comments)  Discharge Equipment Recommendations: drop arm commode, hospital bed, lift device  Barriers to discharge: Inaccessible home and Decreased caregiver support    Assessment:     Suyapa Connelly is a 57 y.o. female admitted with a medical diagnosis of Acute on chronic combined systolic and diastolic heart failure.  She presents with the following impairments/functional limitations: weakness, impaired endurance, impaired self care skills, impaired functional mobility, gait instability, impaired balance, decreased lower extremity function, pain, decreased ROM, impaired skin, impaired cardiopulmonary response to activity. Today's treatment included strengthening, range of motion, balance training, gait training, and cardiovascular endurance interventions. Patient with increased pain today but able to perform exercises with therapy. She required increased motivation and deep breathing techniques throughout session. Patient requires skilled physical therapy services to address deficits and return patient to PLOF with no limitations.     Rehab Prognosis: Good; patient would benefit from acute skilled PT services to address these deficits and reach maximum level of function.    Recent Surgery: * No surgery found *      Plan:     During this hospitalization, patient to be seen 3 x/week to address the identified rehab impairments via gait training, therapeutic activities, therapeutic exercises, neuromuscular re-education, wheelchair management/training and progress toward the following goals:    Plan of Care Expires:  06/06/23    Subjective     Chief Complaint: I get shortness of breath when I sit straight up  Patient/Family Comments/goals: To increase strength  Pain/Comfort:  Pain Rating 1: 10/10  Location - Side 1: Bilateral  Location - Orientation 1: generalized  Location 1:  leg  Pain Addressed 1: Reposition, Distraction  Pain Rating Post-Intervention 1: 10/10      Objective:     Communicated with DEANGELO Benjamin prior to session.  Patient found HOB elevated with peripheral IV, PureWick, telemetry upon PT entry to room.     General Precautions: Standard, fall  Orthopedic Precautions: N/A  Braces: N/A  Respiratory Status: Room air     Functional Mobility:  Bed Mobility:  Rolling Left: moderate assistance  Rolling Right: moderate assistance  Scooting: moderate assistance  Supine to Sit: moderate assistance with extended amount of time to perform  Sit to Supine: moderate assistance with extended amount of time to perform    Therapeutic Activities and Exercises:   Seated exercises: Patient performed 2x10 reps with bilateral upper extremities.  Alternating shoulder flexion  Chest press    Neuromuscular Exercises:  Balance:  Sitting: Performed ~15 minutes sitting EOB with BUEs and CGA. No LOB noted.    Patient Education:   Patient was educated to call nurse if she wanted to get back into bed/need anything. She verbalized understanding.  Patient was educated on benefits of physical therapy on continued improvement in patient's quality of life.  Patient was provided with daily orientation and goals of this PT session.  All questions answered to satisfaction, within scope of PT practice.   Patient voiced no other concerns.  RN notified of PT therapy session.     AM-PAC 6 CLICK MOBILITY  Turning over in bed (including adjusting bedclothes, sheets and blankets)?: 3  Sitting down on and standing up from a chair with arms (e.g., wheelchair, bedside commode, etc.): 1  Moving from lying on back to sitting on the side of the bed?: 3  Moving to and from a bed to a chair (including a wheelchair)?: 2  Need to walk in hospital room?: 1  Climbing 3-5 steps with a railing?: 1  Basic Mobility Total Score: 11     Patient left HOB elevated with all lines intact, call button in reach, and family present..    GOALS:    Multidisciplinary Problems       Physical Therapy Goals          Problem: Physical Therapy    Goal Priority Disciplines Outcome Goal Variances Interventions   Physical Therapy Goal     PT, PT/OT Ongoing, Progressing     Description: Goals to be met by: 2023     Patient will increase functional independence with mobility by performin. Supine to sit with Minimal Assistance.  2. Sit to supine with Minimal Assistance.  3. Bed to chair transfer with Minimal Assistance using LRAD.  4. Wheelchair propulsion x50 feet with Minimal Assistance using bilateral upper extremities.  5. Spouse verbalizes and demonstrates understanding on family training with focus on transfers.                          Time Tracking:     PT Received On: 23  PT Start Time: 1121     PT Stop Time: 1145  PT Total Time (min): 24 min     Billable Minutes: Therapeutic Activity 14 and Therapeutic Exercise 10    Treatment Type: Treatment  PT/PTA: PT     Number of PTA visits since last PT visit: 0     2023

## 2023-05-07 NOTE — ASSESSMENT & PLAN NOTE
"Per chart review from notes in 03/2021, hx of Afib "post op after CABG. Oral amiodarone discontinued and not on NOAC". Per Cardiology eval 03/2021, decision to not continue NOAC as it was likely provoked.     However, EKG from 01/2020 with Atrial Fibrillation while post-op from CABG while undergoing dialysis.      CHADSVASC score:5    PLAN  -Recommend systemic anticoagulation. Hold anticoagulation for now as patient is  pending possible RHC.  -Continue rate control with Carvedilol  "

## 2023-05-07 NOTE — ASSESSMENT & PLAN NOTE
57 y.o F w/ hx of CAD s/p CABG LIMA-LAD 01/2020 & PCI  DESx1 to MidCx), T2DM, diastolic and systolic heart failure(EF15%), PAD s/p RSFA and pop intervention 10/2021, PAF, HTN, HLD here with volume overload in the setting of medication nonadherence    Patient with significant cardiovascular history presenting for ADHF with newly depressed EF  Patient not on GDMT  Takes lasix 40mg daily    TTE 15% (previously 55%), biatrial enlargement, CVP 15, PASP 43, severe tricuspid regurgitation, not noted on prior in 2021  .2, HgA1c 6.2      Intake/Output Summary (Last 24 hours) at 5/7/2023 0818  Last data filed at 5/7/2023 0656  Gross per 24 hour   Intake --   Output 1900 ml   Net -1900 ml     Net IO Since Admission: -5,105.91 mL [05/07/23 0818]    Significant extracellular volume with concerns for possible hypoalbuminemia    Recommendations:  - Continue Furosemide 40 milligram/hour IV drip  - Increase Bidil dose to isosorbide dinitrate 40mg-hydralazine 75 mg TID  - Strict I/Os, daily goal net negative 2-3L. Recommend transfer to cardiology floor nursing unit for close monitoring of I/Os, daily weights  -Will monitor diuresis over weekend, and consider RHC for volume evaluation on Monday vs. Tuesday based on I/Os. Hold anticoagulation for possible procedure  - ROSLYN workup per primary team  - BMP BID to assess for close electrolyte monitoring, goal K>4, Mg>2  - Inpatient stress testing once patient euvolemic  - 1.5L fluid restriction, low Na diet  - recommend starting GDMT when patient euvolemic and renal function improves

## 2023-05-08 LAB
ANION GAP SERPL CALC-SCNC: 12 MMOL/L (ref 8–16)
BNP SERPL-MCNC: 286 PG/ML (ref 0–99)
BUN SERPL-MCNC: 64 MG/DL (ref 6–20)
CALCIUM SERPL-MCNC: 7.1 MG/DL (ref 8.7–10.5)
CHLORIDE SERPL-SCNC: 102 MMOL/L (ref 95–110)
CO2 SERPL-SCNC: 21 MMOL/L (ref 23–29)
CREAT SERPL-MCNC: 4.7 MG/DL (ref 0.5–1.4)
EST. GFR  (NO RACE VARIABLE): 10.3 ML/MIN/1.73 M^2
GLUCOSE SERPL-MCNC: 130 MG/DL (ref 70–110)
POCT GLUCOSE: 108 MG/DL (ref 70–110)
POCT GLUCOSE: 120 MG/DL (ref 70–110)
POCT GLUCOSE: 129 MG/DL (ref 70–110)
POCT GLUCOSE: 131 MG/DL (ref 70–110)
POTASSIUM SERPL-SCNC: 4.5 MMOL/L (ref 3.5–5.1)
SODIUM SERPL-SCNC: 135 MMOL/L (ref 136–145)

## 2023-05-08 PROCEDURE — 63600175 PHARM REV CODE 636 W HCPCS: Performed by: STUDENT IN AN ORGANIZED HEALTH CARE EDUCATION/TRAINING PROGRAM

## 2023-05-08 PROCEDURE — 97530 THERAPEUTIC ACTIVITIES: CPT

## 2023-05-08 PROCEDURE — 36415 COLL VENOUS BLD VENIPUNCTURE: CPT

## 2023-05-08 PROCEDURE — 25000003 PHARM REV CODE 250: Performed by: HOSPITALIST

## 2023-05-08 PROCEDURE — 99232 PR SUBSEQUENT HOSPITAL CARE,LEVL II: ICD-10-PCS | Mod: GC,,, | Performed by: INTERNAL MEDICINE

## 2023-05-08 PROCEDURE — 20600001 HC STEP DOWN PRIVATE ROOM

## 2023-05-08 PROCEDURE — 25000003 PHARM REV CODE 250

## 2023-05-08 PROCEDURE — 63600175 PHARM REV CODE 636 W HCPCS

## 2023-05-08 PROCEDURE — 99232 SBSQ HOSP IP/OBS MODERATE 35: CPT | Mod: GC,,, | Performed by: INTERNAL MEDICINE

## 2023-05-08 PROCEDURE — 80048 BASIC METABOLIC PNL TOTAL CA: CPT

## 2023-05-08 PROCEDURE — 25000003 PHARM REV CODE 250: Performed by: STUDENT IN AN ORGANIZED HEALTH CARE EDUCATION/TRAINING PROGRAM

## 2023-05-08 PROCEDURE — 83880 ASSAY OF NATRIURETIC PEPTIDE: CPT

## 2023-05-08 PROCEDURE — 99233 PR SUBSEQUENT HOSPITAL CARE,LEVL III: ICD-10-PCS | Mod: GC,,, | Performed by: HOSPITALIST

## 2023-05-08 PROCEDURE — 99233 SBSQ HOSP IP/OBS HIGH 50: CPT | Mod: GC,,, | Performed by: HOSPITALIST

## 2023-05-08 RX ORDER — ISOSORBIDE DINITRATE 20 MG/1
40 TABLET ORAL 3 TIMES DAILY
Status: DISCONTINUED | OUTPATIENT
Start: 2023-05-08 | End: 2023-05-29 | Stop reason: HOSPADM

## 2023-05-08 RX ADMIN — ALLOPURINOL 100 MG: 100 TABLET ORAL at 09:05

## 2023-05-08 RX ADMIN — HEPARIN SODIUM 5000 UNITS: 5000 INJECTION INTRAVENOUS; SUBCUTANEOUS at 10:05

## 2023-05-08 RX ADMIN — ASPIRIN 81 MG: 81 TABLET, COATED ORAL at 09:05

## 2023-05-08 RX ADMIN — ISOSORBIDE DINITRATE 40 MG: 20 TABLET ORAL at 10:05

## 2023-05-08 RX ADMIN — INSULIN ASPART 3 UNITS: 100 INJECTION, SOLUTION INTRAVENOUS; SUBCUTANEOUS at 05:05

## 2023-05-08 RX ADMIN — INSULIN DETEMIR 8 UNITS: 100 INJECTION, SOLUTION SUBCUTANEOUS at 10:05

## 2023-05-08 RX ADMIN — SODIUM BICARBONATE 1300 MG: 650 TABLET ORAL at 03:05

## 2023-05-08 RX ADMIN — HEPARIN SODIUM 5000 UNITS: 5000 INJECTION INTRAVENOUS; SUBCUTANEOUS at 03:05

## 2023-05-08 RX ADMIN — DOCUSATE SODIUM 100 MG: 100 CAPSULE, LIQUID FILLED ORAL at 09:05

## 2023-05-08 RX ADMIN — PANTOPRAZOLE SODIUM 40 MG: 40 TABLET, DELAYED RELEASE ORAL at 09:05

## 2023-05-08 RX ADMIN — HEPARIN SODIUM 5000 UNITS: 5000 INJECTION INTRAVENOUS; SUBCUTANEOUS at 05:05

## 2023-05-08 RX ADMIN — SODIUM BICARBONATE 1300 MG: 650 TABLET ORAL at 09:05

## 2023-05-08 RX ADMIN — ISOSORBIDE DINITRATE: 20 TABLET ORAL at 09:05

## 2023-05-08 RX ADMIN — CLOPIDOGREL BISULFATE 75 MG: 75 TABLET ORAL at 09:05

## 2023-05-08 RX ADMIN — ACETAMINOPHEN 1000 MG: 500 TABLET ORAL at 03:05

## 2023-05-08 RX ADMIN — CARVEDILOL 3.12 MG: 3.12 TABLET, FILM COATED ORAL at 09:05

## 2023-05-08 RX ADMIN — HYDRALAZINE HYDROCHLORIDE 75 MG: 50 TABLET ORAL at 10:05

## 2023-05-08 RX ADMIN — FUROSEMIDE 30 MG/HR: 10 INJECTION, SOLUTION INTRAMUSCULAR; INTRAVENOUS at 03:05

## 2023-05-08 RX ADMIN — SODIUM BICARBONATE 1300 MG: 650 TABLET ORAL at 10:05

## 2023-05-08 RX ADMIN — CARVEDILOL 3.12 MG: 3.12 TABLET, FILM COATED ORAL at 05:05

## 2023-05-08 RX ADMIN — DOCUSATE SODIUM 100 MG: 100 CAPSULE, LIQUID FILLED ORAL at 10:05

## 2023-05-08 RX ADMIN — INSULIN ASPART 3 UNITS: 100 INJECTION, SOLUTION INTRAVENOUS; SUBCUTANEOUS at 01:05

## 2023-05-08 RX ADMIN — SERTRALINE HYDROCHLORIDE 50 MG: 50 TABLET ORAL at 09:05

## 2023-05-08 RX ADMIN — AMITRIPTYLINE HYDROCHLORIDE 50 MG: 25 TABLET, FILM COATED ORAL at 10:05

## 2023-05-08 RX ADMIN — ATORVASTATIN CALCIUM 80 MG: 40 TABLET, FILM COATED ORAL at 09:05

## 2023-05-08 NOTE — PROGRESS NOTES
Andrew Andrade - Cardiology [facility] Medicine  Progress Note    Patient Name: Suyapa Connelly  MRN: [id number]  Patient Class: IP- Inpatient   Admission Date: 5/1/2023  Length of Stay: 5 days  Attending Physician: Anna Lopez MD  Primary Care Provider: Magen Christensen MD        Subjective:     Principal Problem:Acute on chronic combined systolic and diastolic heart failure        HPI:  57 y.o. female with past medical history of CAD s/p CABG, CKD, DM, HTN, HLD, s/p bilateral AKA who presnets with chest pain, difficulty breathing, leg swelling.     Per ED She has been unable to access any of her medications for the last 3 months. Over the last few months she has noted worsening shortness of breath, particulatly orthopnea, and leg swelling. In the last two weeks she endorses associated abdominal pain and swelling, reduced appetite, and pleuritic chest pain with deep inspiration. She endorses occasional lighthededness with changes in position. Last week she had an episode where she was unable to breathe for about two minutes which was very upsetting for her and her , but she recovered and did not seek care at that time. Today, she complains of significant difficulty breathing, swelling in her legs and abdomen, and reduced appetite.     No headaches, visual changes, URI symptoms, palpitations, nausea, vomiting, diarrhea, blood in her stool, dysuria, hematuria, numbness or weakness in her extremities. She does endorse occasional paresthesias associated with her chronic phantom limb pain. She additionally has had increased difficulties with depression recently, denies suicidal or homicidal ideation, but has had considerable stress and low mood.      Of note, the patient saw a new primary care provider 4/21 for similar concerns who recommended she proceed to the ED at that time, but they were unable due to financial and geographic concerns. She lives two hours away from [facility] and her  cares for her and  elderly mother with dementia. She has had decreased access to medical care over the last year, and unable to access most of her prescriptions. She had a 90 day supply of four medications (lasix, statin, clopidogrel, sertraline) but these were accidentally lost and she has been unable to afford a replacement. They present today for evaluation and to reestablish care and access to her prescriptions.     Labs on admission significant for an elevated BNP, hypocalcemia which corrected is 8.3, significant elevation in Creatinine and a slight elevation of troponin    CXR  Cardiomegaly with pulmonary interstitial edema, suggestive of pulmonary edema secondary to CHF.      Overview/Hospital Course:  Diuresis initiated with Lasix IV.  1.2L UOP in the past 24 hours.  Vitals reassuring with adequate sats on ambient air.  Still very edematious; Cardiology recs lasix gtt.  GDMT initiated. Pt given 2mg morphine for moderate to severe pain in legs from swelling. Diuresis increased to 40mg/hr due to inadequate urine output. Cr continued to increase. Cr stable at new Lasix rate. Diuresed 1.9L with some improvement in clinical status.      Interval History: Pt put out 4L overnight. Cr bumped to 4.7. Pt still complaining of fluid and tenderness due to the swelling.     Review of Systems   Constitutional:  Negative for chills, fatigue and fever.   HENT:  Negative for congestion, dental problem, facial swelling, postnasal drip, rhinorrhea, sore throat and trouble swallowing.    Eyes:  Negative for photophobia and pain.   Respiratory:  Negative for cough, choking, chest tightness, shortness of breath and wheezing.    Cardiovascular:  Positive for leg swelling. Negative for chest pain.   Gastrointestinal:  Negative for abdominal distention, abdominal pain, diarrhea, nausea and vomiting.   Endocrine: Negative.    Genitourinary:  Negative for dysuria, flank pain, frequency and hematuria.   Musculoskeletal:  Negative for back pain.   Skin:   Negative for pallor and rash.   Allergic/Immunologic: Negative.    Neurological:  Negative for dizziness, syncope, weakness, numbness and headaches.   Psychiatric/Behavioral:  The patient is not nervous/anxious.    Objective:     Vital Signs (Most Recent):  Temp: 98.2 °F (36.8 °C) (05/08/23 1215)  Pulse: 87 (05/08/23 1503)  Resp: 18 (05/08/23 0941)  BP: (!) 111/52 (05/08/23 1215)  SpO2: 98 % (05/08/23 1215) Vital Signs (24h Range):  Temp:  [97.5 °F (36.4 °C)-98.9 °F (37.2 °C)] 98.2 °F (36.8 °C)  Pulse:  [] 87  Resp:  [16-18] 18  SpO2:  [93 %-98 %] 98 %  BP: ()/(50-59) 111/52     Weight: 95.3 kg (210 lb)  Body mass index is 34.95 kg/m².    Intake/Output Summary (Last 24 hours) at 5/8/2023 1539  Last data filed at 5/8/2023 1307  Gross per 24 hour   Intake 720 ml   Output 3325 ml   Net -2605 ml         Physical Exam  Vitals and nursing note reviewed.   Constitutional:       General: She is not in acute distress.     Appearance: Normal appearance. She is obese. She is not ill-appearing or diaphoretic.   HENT:      Head: Normocephalic and atraumatic.      Right Ear: External ear normal.      Left Ear: External ear normal.      Nose: Nose normal.      Mouth/Throat:      Mouth: Mucous membranes are moist.      Pharynx: Oropharynx is clear.   Eyes:      General: No scleral icterus.     Extraocular Movements: Extraocular movements intact.      Conjunctiva/sclera: Conjunctivae normal.      Pupils: Pupils are equal, round, and reactive to light.   Cardiovascular:      Rate and Rhythm: Normal rate and regular rhythm.      Pulses: Normal pulses.      Heart sounds: Normal heart sounds. No murmur heard.  Pulmonary:      Effort: Pulmonary effort is normal. No respiratory distress.      Breath sounds: Normal breath sounds. No stridor. No wheezing or rhonchi.   Chest:      Chest wall: No tenderness.   Abdominal:      General: Abdomen is flat. There is no distension.      Palpations: Abdomen is soft.      Tenderness: There  is no abdominal tenderness. There is no guarding or rebound.   Musculoskeletal:         General: Swelling present. No tenderness. Normal range of motion.      Cervical back: Normal range of motion and neck supple. No rigidity or tenderness.      Right lower leg: Edema present.      Left lower leg: Edema present.      Comments: Trigger finger R middle finger   Skin:     General: Skin is warm and dry.      Capillary Refill: Capillary refill takes less than 2 seconds.      Coloration: Skin is not jaundiced.      Findings: No erythema.   Neurological:      General: No focal deficit present.      Mental Status: She is alert and oriented to person, place, and time.      Cranial Nerves: No cranial nerve deficit.      Motor: No weakness.   Psychiatric:         Mood and Affect: Mood normal.         Behavior: Behavior normal.           Significant Labs: All pertinent labs within the past 24 hours have been reviewed.    Significant Imaging: I have reviewed all pertinent imaging results/findings within the past 24 hours.      Assessment/Plan:      * Acute on chronic combined systolic and diastolic heart failure  Last Echo 6/21 showing     The estimated ejection fraction is 55%.   The left ventricle is normal in size with normal systolic function.   Normal left ventricular diastolic function.   Normal right ventricular size with normal right ventricular systolic function.   Mild tricuspid regurgitation.   The estimated PA systolic pressure is 31 mmHg.   Normal central venous pressure (3 mmHg).    Echo    Result Date: 5/2/2023  · The left ventricle is mildly enlarged with severely decreased systolic   function. The estimated ejection fraction is 15%.  · There is severe left ventricular global hypokinesis.  · Normal right ventricular size with low normal right ventricular systolic   function.  · Grade I left ventricular diastolic dysfunction.  · Biatrial enlargement.  · Mild-to-moderate mitral regurgitation.  · Severe  tricuspid regurgitation.  · The estimated PA systolic pressure is 43 mmHg.  · Elevated central venous pressure (15 mmHg).           -IV Diuresis  -Qq4 BMPs with electrolyte repletion as needed; goal K>4, Mg>2  -Cardiology consult for acute decompensated heart failure with reduced EF   -Transition to lasix 30/hr for daily goal of net negative 2-3L  - Inpatient vs outpatient stress testing once patient euvolemic  - Strict I/Os with daily weights  - 1.5L fluid restriction, low Na diet  - recommend starting GDMT when patient euvolemic and renal function improves  -Tylenol 1g up to 3 times a day        ROSLYN (acute kidney injury)  Pt with increased Cr to 4.7 from 2.0 baseline. Possibly due to renovascular congestion from volume overload.     -Kerns  -Strict Is/Os  -Avoid nephrotoxic agents  -Lasix 30mg/hr  -Daily CMPs        Acute hypoxemic respiratory failure  See Acute on chronic CHF    CKD (chronic kidney disease), stage IV  History of CKD. See ROSLYN.      Above-knee amputation of right lower extremity  Prior history consistent with exam      Uses self-applied continuous glucose monitoring device  POCT glucose and daily CMPs    -Restart insulin for hyperglycemia      Dermatitis associated with moisture  Large body habitus with dermatitis to folds. Will monitor for infection.    -Zinc based powder  -Topical abx if indicated      Status post above knee amputation, left  History of.      Hypoalbuminemia  Pt with poor PO intake reported. Pt eats one meal a day and has associated nausea and vomiting. Will try antiemetics before feeds to improve intake. Will monitor daily intake and calorie replacement.     -Calorie count  -Diabetic/Renal diet  -Antiemetics before meals; Zofran 4 IV PRN  -Monitor Qtc for prolongation with antiemetics      Impaired functional mobility and endurance  PT/OT for bedbound patient      Paroxysmal atrial fibrillation  Continue home rate control  NSR on EKG        Anemia  Asymptomatic. Higher than 8  months ago. No signs of acute bleed at this time. Haptoglobin elevated.    -CBC daily  -Transfuse if <7        S/P CABG x 1  History of. Sternal scar well healed.       CAD (coronary artery disease)  History of CAD with retrosternal chest pain. EKG negative to ST changes. Nonspecific T wave inversions.     -Cardiac tele  -EKG prn  -Bidil per Cards recs      CAROLIN (generalized anxiety disorder)  Continue home anxiety medications      Essential hypertension  Continue home antihypertensives      PAD (peripheral artery disease)  History of bilateral AKA.           VTE Risk Mitigation (From admission, onward)         Ordered     heparin (porcine) injection 5,000 Units  Every 8 hours         05/07/23 1547     IP VTE HIGH RISK PATIENT  Once         05/07/23 1547                Discharge Planning   DEVAN: 5/8/2023     Code Status: Full Code   Is the patient medically ready for discharge?: No    Reason for patient still in hospital (select all that apply): Patient trending condition and Treatment  Discharge Plan A: Home Health   Discharge Delays: None known at this time      Onur Bueno MD  Department of Hospital Medicine   Andrew Andrade - Cardiology Stepdown

## 2023-05-08 NOTE — ASSESSMENT & PLAN NOTE
Last Echo 6/21 showing     The estimated ejection fraction is 55%.   The left ventricle is normal in size with normal systolic function.   Normal left ventricular diastolic function.   Normal right ventricular size with normal right ventricular systolic function.   Mild tricuspid regurgitation.   The estimated PA systolic pressure is 31 mmHg.   Normal central venous pressure (3 mmHg).    Echo    Result Date: 5/2/2023  · The left ventricle is mildly enlarged with severely decreased systolic   function. The estimated ejection fraction is 15%.  · There is severe left ventricular global hypokinesis.  · Normal right ventricular size with low normal right ventricular systolic   function.  · Grade I left ventricular diastolic dysfunction.  · Biatrial enlargement.  · Mild-to-moderate mitral regurgitation.  · Severe tricuspid regurgitation.  · The estimated PA systolic pressure is 43 mmHg.  · Elevated central venous pressure (15 mmHg).           -IV Diuresis  -Qq4 BMPs with electrolyte repletion as needed; goal K>4, Mg>2  -Cardiology consult for acute decompensated heart failure with reduced EF   -Transition to lasix 30/hr for daily goal of net negative 2-3L  - Inpatient vs outpatient stress testing once patient euvolemic  - Strict I/Os with daily weights  - 1.5L fluid restriction, low Na diet  - recommend starting GDMT when patient euvolemic and renal function improves  -Tylenol 1g up to 3 times a day

## 2023-05-08 NOTE — ASSESSMENT & PLAN NOTE
Pt with increased Cr to 4.7 from 2.0 baseline. Possibly due to renovascular congestion from volume overload.     -Kerns  -Strict Is/Os  -Avoid nephrotoxic agents  -Lasix 30mg/hr  -Daily CMPs

## 2023-05-08 NOTE — PLAN OF CARE
Patient with no urine output since admission to the unit at 1540. Patient voided on IMTA prior to being transferred per report. Patient is awake, alert and oriented. Patient and  at bedside was informed of fluid restriction and need to have strict intake and output. Vitals stable.

## 2023-05-08 NOTE — SUBJECTIVE & OBJECTIVE
Interval History: Pt put out 4L overnight. Cr bumped to 4.7. Pt still complaining of fluid and tenderness due to the swelling.     Review of Systems   Constitutional:  Negative for chills, fatigue and fever.   HENT:  Negative for congestion, dental problem, facial swelling, postnasal drip, rhinorrhea, sore throat and trouble swallowing.    Eyes:  Negative for photophobia and pain.   Respiratory:  Negative for cough, choking, chest tightness, shortness of breath and wheezing.    Cardiovascular:  Positive for leg swelling. Negative for chest pain.   Gastrointestinal:  Negative for abdominal distention, abdominal pain, diarrhea, nausea and vomiting.   Endocrine: Negative.    Genitourinary:  Negative for dysuria, flank pain, frequency and hematuria.   Musculoskeletal:  Negative for back pain.   Skin:  Negative for pallor and rash.   Allergic/Immunologic: Negative.    Neurological:  Negative for dizziness, syncope, weakness, numbness and headaches.   Psychiatric/Behavioral:  The patient is not nervous/anxious.    Objective:     Vital Signs (Most Recent):  Temp: 98.2 °F (36.8 °C) (05/08/23 1215)  Pulse: 87 (05/08/23 1503)  Resp: 18 (05/08/23 0941)  BP: (!) 111/52 (05/08/23 1215)  SpO2: 98 % (05/08/23 1215) Vital Signs (24h Range):  Temp:  [97.5 °F (36.4 °C)-98.9 °F (37.2 °C)] 98.2 °F (36.8 °C)  Pulse:  [] 87  Resp:  [16-18] 18  SpO2:  [93 %-98 %] 98 %  BP: ()/(50-59) 111/52     Weight: 95.3 kg (210 lb)  Body mass index is 34.95 kg/m².    Intake/Output Summary (Last 24 hours) at 5/8/2023 1539  Last data filed at 5/8/2023 1307  Gross per 24 hour   Intake 720 ml   Output 3325 ml   Net -2605 ml         Physical Exam  Vitals and nursing note reviewed.   Constitutional:       General: She is not in acute distress.     Appearance: Normal appearance. She is obese. She is not ill-appearing or diaphoretic.   HENT:      Head: Normocephalic and atraumatic.      Right Ear: External ear normal.      Left Ear: External ear  normal.      Nose: Nose normal.      Mouth/Throat:      Mouth: Mucous membranes are moist.      Pharynx: Oropharynx is clear.   Eyes:      General: No scleral icterus.     Extraocular Movements: Extraocular movements intact.      Conjunctiva/sclera: Conjunctivae normal.      Pupils: Pupils are equal, round, and reactive to light.   Cardiovascular:      Rate and Rhythm: Normal rate and regular rhythm.      Pulses: Normal pulses.      Heart sounds: Normal heart sounds. No murmur heard.  Pulmonary:      Effort: Pulmonary effort is normal. No respiratory distress.      Breath sounds: Normal breath sounds. No stridor. No wheezing or rhonchi.   Chest:      Chest wall: No tenderness.   Abdominal:      General: Abdomen is flat. There is no distension.      Palpations: Abdomen is soft.      Tenderness: There is no abdominal tenderness. There is no guarding or rebound.   Musculoskeletal:         General: Swelling present. No tenderness. Normal range of motion.      Cervical back: Normal range of motion and neck supple. No rigidity or tenderness.      Right lower leg: Edema present.      Left lower leg: Edema present.      Comments: Trigger finger R middle finger   Skin:     General: Skin is warm and dry.      Capillary Refill: Capillary refill takes less than 2 seconds.      Coloration: Skin is not jaundiced.      Findings: No erythema.   Neurological:      General: No focal deficit present.      Mental Status: She is alert and oriented to person, place, and time.      Cranial Nerves: No cranial nerve deficit.      Motor: No weakness.   Psychiatric:         Mood and Affect: Mood normal.         Behavior: Behavior normal.           Significant Labs: All pertinent labs within the past 24 hours have been reviewed.    Significant Imaging: I have reviewed all pertinent imaging results/findings within the past 24 hours.

## 2023-05-08 NOTE — PHYSICIAN QUERY
PT Name: Suyapa Connelly  MR #: [id number]     DOCUMENTATION CLARIFICATION     CDS/: Theodora RobertoRN               Contact information: [email or web address]  This form is a permanent document in the medical record.     Query Date: May 8, 2023    By submitting this query, we are merely seeking further clarification of documentation.  Please utilize your independent clinical judgment when addressing the question(s) below.  The Medical Record contains the following   Indicators   Supporting Clinical Findings Location in Medical Record   x Documentation of Respiratory Failure, ARDS  Acute hypoxemic respiratory failure   See Acute on chronic CHF H&P 5/1  PN 5/2,5/3,5/4,5/5,5/6,5/7     x SOB, HARTMAN, Wheezing, Productive Cough, Use of Accessory Muscles, etc. Here with worsening SOB, concern for fluid retention/anasarca, and 9/10 chest pain    She presents to [facility] for intermittent chest pain, SOB, and abdominal/lower extremity swelling x 1 month  Cardiovascular:  Positive for chest pain, dyspnea on exertion and orthopnea. Negative for palpitations and syncope.  Respiratory:  Positive for shortness of breath. Negative for cough, hemoptysis, sputum production and wheezing.    ED Prov Note 5/1      Cards Consult 5/2       x RR     ABGs     O2 sat Vital Signs (24h Range):  Resp:  [18-20] 18  SpO2:  [99 %-100 %] 100 %    Resp:  [18-20] 20  SpO2:  [97 %-100 %] 97 %    Resp:  [17-19] 19  SpO2:  [95 %-100 %] 100 %    Resp:  [17-19] 18  SpO2:  [96 %-100 %] 96 %    Resp:  [12-18] 12  SpO2:  [95 %-100 %] 97 %    Resp:  [14-18] 18  SpO2:  [95 %-97 %] 95 %    Resp:  [17-20] 18  SpO2:  [93 %-99 %] 98 %   H&P 5/1        HM PN 5/2      HM PN 5/3      HM PN 5/4      HM PN 5/5      HM PN 5/6      HM PN 5/7        Hypoxia/Hypercapnia      BiPAP/Intubation/Mechanical Ventilation      Supplemental O2      Home O2, Oxygen Dependence     x Respiratory distress  She is not diaphoretic. No distress.    Pulmonary:     Effort: Pulmonary  effort is normal. No respiratory distress.     Breath sounds: Normal breath sounds. No stridor. No wheezing or rhonchi. No rales.   ED Prov Note 5/1      H&P 5/1   x Radiology findings CXR:  FINDINGS:  There are postoperative changes of median sternotomy.  The sternal wires are intact.  There has been interval removal of the previous left-sided IJ catheter.  Monitoring EKG leads are present.  The trachea is unremarkable.  The cardiomediastinal silhouette is enlarged.  There is no evidence of free air beneath the hemidiaphragms.  There are no pleural effusions.  There is no evidence of a pneumothorax.  There is no evidence of pneumomediastinum.  There is pulmonary interstitial edema.  There are degenerative changes in the osseous structures.  Impression:  Cardiomegaly with pulmonary interstitial edema, suggestive of pulmonary edema secondary to CHF.   CXR 5/1   x Acute/Chronic Illness 57 y.o. female with past medical history of CAD s/p CABG, CKD, DM, HTN, HLD, s/p bilateral AKA who presnets with chest pain, difficulty breathing, leg swelling    Acute on chronic combined systolic and diastolic heart failure  ROSLYN (acute kidney injury)   H&P 5/1          Treatment      Other         The noted clinical guidelines following a diagnosis are only system guidelines and do not replace the providers clinical judgment.    Due to the conflicting clinical picture, please clinically validate the diagnosis of _Acute hypoxemic respiratory failure________________.      If validated, please provide additional clinical support for the diagnosis.     [  x  ] Acute hypoxemic respiratory failure is not confirmed and/or it has been ruled out     [    ] Acute hypoxemic respiratory failure is not confirmed and/or it has been ruled out, other diagnosis ruled in (please specify):_______________     [    ] Acute Respiratory Failure with Hypoxia (ABG pO2 < 60 mmHg or O2 sat of <91% on room air and respiratory symptoms documented) diagnosis is  confirmed and additional clinical support/decision-making indicators for the diagnosis include (please specify): _______________________________________________     [    ] Acute Respiratory Failure diagnosis is confirmed and additional clinical support/decision-making indicators for the diagnosis include (please specify): _______________________________________________     [    ] Other clarification (please specify): ___________________           Please document in your progress notes daily for the duration of treatment until resolved and include in your discharge summary.     Reference:    RAUL Lopez MD. (2020, March 13). Acute respiratory distress syndrome: Clinical features, diagnosis, and complications in adults ([id number] COLLETTE Gould MD & [id number] DAYNE King MD, Eds.). Retrieved November 13, 2020, from https://[email or web address].[email or web address]-[email or web address]-distress-syndrome-[email or web address]-features-diagnosis-[email or web address]-complications-[email or web address]-[email or web address]?search=ards&source=search_result&selectedTitle=1~[email or web address]&usage_type=default&display_rank=1  Form No. [id number]

## 2023-05-08 NOTE — PROGRESS NOTES
[facility] - [facility] ([place] [address])  Cardiology  Progress Note    Patient Name: Suyapa Connelly  MRN: [id number]  Admission Date: 5/1/2023  Hospital Length of Stay: 5 days  Code Status: Full Code   Attending Physician: Anna Lopez MD   Primary Care Physician: Magen Christensen MD  Expected Discharge Date: 5/8/2023  Principal Problem:Acute on chronic combined systolic and diastolic heart failure    Subjective:     Interval History: Patient was feeling a little better this morning. She diuresed well in the last 24 hours and is negative by almost 4L.     However, still has trouble laying flat for long periods of time.    Holding off on RHC today as a result. She said she will try again later to lie flat for us.     Review of Systems   Cardiovascular:  Positive for dyspnea on exertion, leg swelling and orthopnea. Negative for chest pain.   Respiratory:  Positive for shortness of breath. Negative for cough and sputum production.    Gastrointestinal:  Negative for nausea and vomiting.   Genitourinary:  Negative for dysuria and frequency.   Objective:     Vital Signs (Most Recent):  Temp: 98.2 °F (36.8 °C) (05/08/23 1215)  Pulse: 88 (05/08/23 1215)  Resp: 18 (05/08/23 0941)  BP: (!) 111/52 (05/08/23 1215)  SpO2: 98 % (05/08/23 1215) Vital Signs (24h Range):  Temp:  [97.5 °F (36.4 °C)-98.9 °F (37.2 °C)] 98.2 °F (36.8 °C)  Pulse:  [] 88  Resp:  [16-18] 18  SpO2:  [93 %-98 %] 98 %  BP: ()/(50-59) 111/52     Weight: 95.3 kg (210 lb)  Body mass index is 34.95 kg/m².     SpO2: 98 %         Intake/Output Summary (Last 24 hours) at 5/8/2023 1322  Last data filed at 5/8/2023 1307  Gross per 24 hour   Intake 720 ml   Output 3325 ml   Net -2605 ml       Lines/Drains/Airways       Drain  Duration             Female External Urinary Catheter 05/03/23 0805 5 days              Peripheral Intravenous Line  Duration                  Peripheral IV - Single Lumen 05/05/23 1840 20 G;1 3/4 in Right;Anterior Upper Arm 2 days          Peripheral IV - Single Lumen 05/07/23 0400 22 G Anterior;Right Forearm 1 day                       Physical Exam  Vitals and nursing note reviewed.   Constitutional:       General: She is not in acute distress.     Appearance: She is obese. She is not ill-appearing, toxic-appearing or diaphoretic.   HENT:      Head: Normocephalic and atraumatic.      Mouth/Throat:      Mouth: Mucous membranes are moist.   Eyes:      General: No scleral icterus.        Right eye: No discharge.         Left eye: No discharge.   Neck:      Vascular: JVD present.   Cardiovascular:      Rate and Rhythm: Normal rate and regular rhythm.   Pulmonary:      Effort: Pulmonary effort is normal. No respiratory distress.      Breath sounds: No wheezing.   Abdominal:      General: Bowel sounds are normal. There is distension.      Palpations: Abdomen is soft.      Tenderness: There is abdominal tenderness. There is no guarding.   Musculoskeletal:         General: Swelling (Abdominal, thigh and lower extremity edema) present.      Cervical back: No rigidity.      Right lower leg: Edema present.      Left lower leg: Edema present.      Comments: Patient with bilateral AKA. Edema of thighs   Skin:     General: Skin is warm.      Coloration: Skin is not jaundiced.   Neurological:      Mental Status: She is alert and oriented to person, place, and time. Mental status is at baseline.   Psychiatric:         Mood and Affect: Mood normal.         Behavior: Behavior normal.          Significant Labs: CMP   Recent Labs   Lab 05/07/23  0609 05/08/23  0513   * 135*   K 4.6 4.5    102   CO2 21* 21*   * 130*   BUN 62* 64*   CREATININE 4.4* 4.7*   CALCIUM 7.0* 7.1*   ANIONGAP 11 12    and CBC No results for input(s): WBC, HGB, HCT, PLT in the last 48 hours.    Significant Imaging:  Reviewed    Assessment and Plan:     * Acute on chronic combined systolic and diastolic heart failure  57 y.o F w/ hx of CAD s/p CABG LIMA-LAD 01/2020 & PCI   "DESx1 to MidCx), T2DM, diastolic and systolic heart failure(EF15%), PAD s/p RSFA and pop intervention 10/2021, then bilateral AKA, PAF, HTN, HLD here with volume overload in the setting of medication nonadherence.    TTE this admission showed EF of 15% (previously 55%), biatrial enlargement, CVP 15, PASP 43, severe tricuspid regurgitation, not noted on prior in 2021  .2, HgA1c 6.2    Intake/Output Summary (Last 24 hours) at 5/8/2023 1324  Last data filed at 5/8/2023 1307  Gross per 24 hour   Intake 720 ml   Output 3325 ml   Net -2605 ml     Net IO Since Admission: -8,410.91 mL [05/08/23 1324]    Patient has improved significantly in the last few days on a Lasix gtt, but remains unable to lie flat at the moment.     Recommendations:  - Continue Furosemide gtt at 30mg/hr  - Continue Bidil hydralazine 70 mg + isosorbide dinitrate 40mg TID  - Strict I/Os, daily goal net negative 2-3L.   - Recommend transfer to cardiology floor nursing unit for close monitoring of I/Os, daily weights  - If patient is able to lie flat for 10 minutes, will consider RHC. Unable to do this today.   - Ok to continue holding anticoagulation  - Continue to monitor cardiorenal dysfunction  - PET stress testing once patient euvolemic, either inpatient or outpatient  - 1.5L fluid restriction, low Na diet  - Optimize GDMT when patient euvolemic and renal function improves    Paroxysmal atrial fibrillation  Per chart review from notes in 03/2021, hx of Afib "post op after CABG. Oral amiodarone discontinued and not on NOAC". Per Cardiology eval 03/2021, decision to not continue NOAC as it was likely provoked.     However, EKG from 01/2020 with Atrial Fibrillation while post-op from CABG while undergoing dialysis.      CHADSVASC score:5    - Recommend systemic anticoagulation. Hold anticoagulation for now as patient is  pending possible RHC.  - Continue rate control with Carvedilol    We will continue to follow    VTE Risk Mitigation (From " admission, onward)         Ordered     heparin (porcine) injection 5,000 Units  Every 8 hours         05/07/23 1547     IP VTE HIGH RISK PATIENT  Once         05/07/23 1547                Angi Mchugh MD  Cardiology  [facility] - Intensive Care ([place])

## 2023-05-08 NOTE — PT/OT/SLP PROGRESS
Occupational Therapy   Treatment    Name: Suyapa Connelly  MRN: [id number]  Admitting Diagnosis:  Acute on chronic combined systolic and diastolic heart failure       Recommendations:     Discharge Recommendations: other (see comments)  Discharge Equipment Recommendations:  drop arm commode, hospital bed, lift device (slide board depending on postural control)  Barriers to discharge:  Other (Comment) (requires extensive assist)    Assessment:     Suyapa Connelly is a 57 y.o. female with a medical diagnosis of Acute on chronic combined systolic and diastolic heart failure.  She presents with deficits in functional mobility and increased edema in BLE. Pt. Noted to have pain in buttocks region when draw sheetto HOB on this date and wound noted on gluteal midline region with bloody drainage. OT notified nurse who came to assess.  Pt. Was able to roll bilaterally with use of rail and Mod A.  Resting hand splint ordered on this date from [facility] to be delivered to pt.Pt. would benefit from continued OT services to improve endurance as well as assess for equipment needs in home environment.  Patient would benefit from continued OT services to maximize level of safety and independence with self-care tasks.   Performance deficits affecting function are weakness, impaired endurance, impaired self care skills, impaired functional mobility, impaired balance, decreased lower extremity function, impaired cardiopulmonary response to activity, impaired skin.     Rehab Prognosis:  Good; patient would benefit from acute skilled OT services to address these deficits and reach maximum level of function.       Plan:     Patient to be seen 4 x/week to address the above listed problems via self-care/home management, therapeutic activities, therapeutic exercises, neuromuscular re-education  Plan of Care Expires: 06/07/23  Plan of Care Reviewed with: patient    Subjective     Chief Complaint: nurse  Patient/Family Comments/goals: To have  swelling go down so she can have a test done (cardiac)  Pain/Comfort:  Pain Rating 1:  (not rated)  Pain Rating Post-Intervention 1:  (better when positioned off buttocks)    Objective:     Communicated with: nurse prior to session.  Patient found supine with PureWick, peripheral IV upon OT entry to room. Pt. Positioned on air mattress overlay    General Precautions: Standard, fall (B AKA)    Orthopedic Precautions:N/A  Braces: N/A  Respiratory Status: Room air     Occupational Performance:     Bed Mobility:    Patient completed Rolling/Turning to Left with  moderate assistance with rail  Patient completed Rolling/Turning to Right with moderate assistance with rail    Functional Mobility/Transfers:  Drawsheet x 2 to HOB  Functional Mobility: not tested    Activities of Daily Living:  Not performed      [facility] 6 Click ADL: 13    Treatment & Education:  OT instructed pt. And pt. Spouse on need to be repositioned every 2 hours to prevent further skin breakdown. Resting hand splint ordered for pt. Via [facility]. OT educated pt. And spouse on goals of therapy and possible DME that would be useful to assist pt. And spouse in home environment.     Patient left right sidelying with all lines intact, call button in reach, and spouse present    GOALS:   Multidisciplinary Problems       Occupational Therapy Goals          Problem: Occupational Therapy    Goal Priority Disciplines Outcome Interventions   Occupational Therapy Goal     OT, PT/OT Ongoing, Progressing    Description: Goals to be met by: 5/25     Patient will increase functional independence with ADLs by performing:    UE Dressing with Modified [place].  LE Dressing with Modified [place].  Sitting at edge of bed x10 minutes with Stand-by Assistance.  Rolling to Bilateral with Stand-by Assistance.   Supine to sit with [place].                         Time Tracking:     OT Date of Treatment: 05/08/23  OT Start Time: 1413  OT Stop Time: 1456  OT Total Time  (min): 43 min    Billable Minutes:Therapeutic Activity 43    OT/MACKENZIE: OT          5/8/2023

## 2023-05-08 NOTE — SUBJECTIVE & OBJECTIVE
Interval History: Patient was feeling a little better this morning. She diuresed well in the last 24 hours and is negative by almost 4L.     However, still has trouble laying flat for long periods of time.    Holding off on RHC today as a result. She said she will try again later to lie flat for us.     Review of Systems   Cardiovascular:  Positive for dyspnea on exertion, leg swelling and orthopnea. Negative for chest pain.   Respiratory:  Positive for shortness of breath. Negative for cough and sputum production.    Gastrointestinal:  Negative for nausea and vomiting.   Genitourinary:  Negative for dysuria and frequency.   Objective:     Vital Signs (Most Recent):  Temp: 98.2 °F (36.8 °C) (05/08/23 1215)  Pulse: 88 (05/08/23 1215)  Resp: 18 (05/08/23 0941)  BP: (!) 111/52 (05/08/23 1215)  SpO2: 98 % (05/08/23 1215) Vital Signs (24h Range):  Temp:  [97.5 °F (36.4 °C)-98.9 °F (37.2 °C)] 98.2 °F (36.8 °C)  Pulse:  [] 88  Resp:  [16-18] 18  SpO2:  [93 %-98 %] 98 %  BP: ()/(50-59) 111/52     Weight: 95.3 kg (210 lb)  Body mass index is 34.95 kg/m².     SpO2: 98 %         Intake/Output Summary (Last 24 hours) at 5/8/2023 1322  Last data filed at 5/8/2023 1307  Gross per 24 hour   Intake 720 ml   Output 3325 ml   Net -2605 ml       Lines/Drains/Airways       Drain  Duration             Female External Urinary Catheter 05/03/23 0805 5 days              Peripheral Intravenous Line  Duration                  Peripheral IV - Single Lumen 05/05/23 1840 20 G;1 3/4 in Right;Anterior Upper Arm 2 days         Peripheral IV - Single Lumen 05/07/23 0400 22 G Anterior;Right Forearm 1 day                       Physical Exam  Vitals and nursing note reviewed.   Constitutional:       General: She is not in acute distress.     Appearance: She is obese. She is not ill-appearing, toxic-appearing or diaphoretic.   HENT:      Head: Normocephalic and atraumatic.      Mouth/Throat:      Mouth: Mucous membranes are moist.   Eyes:       General: No scleral icterus.        Right eye: No discharge.         Left eye: No discharge.   Neck:      Vascular: JVD present.   Cardiovascular:      Rate and Rhythm: Normal rate and regular rhythm.   Pulmonary:      Effort: Pulmonary effort is normal. No respiratory distress.      Breath sounds: No wheezing.   Abdominal:      General: Bowel sounds are normal. There is distension.      Palpations: Abdomen is soft.      Tenderness: There is abdominal tenderness. There is no guarding.   Musculoskeletal:         General: Swelling (Abdominal, thigh and lower extremity edema) present.      Cervical back: No rigidity.      Right lower leg: Edema present.      Left lower leg: Edema present.      Comments: Patient with bilateral AKA. Edema of thighs   Skin:     General: Skin is warm.      Coloration: Skin is not jaundiced.   Neurological:      Mental Status: She is alert and oriented to person, place, and time. Mental status is at baseline.   Psychiatric:         Mood and Affect: Mood normal.         Behavior: Behavior normal.          Significant Labs: CMP   Recent Labs   Lab 05/07/23  0609 05/08/23  0513   * 135*   K 4.6 4.5    102   CO2 21* 21*   * 130*   BUN 62* 64*   CREATININE 4.4* 4.7*   CALCIUM 7.0* 7.1*   ANIONGAP 11 12    and CBC No results for input(s): WBC, HGB, HCT, PLT in the last 48 hours.    Significant Imaging:  Reviewed

## 2023-05-08 NOTE — ASSESSMENT & PLAN NOTE
"Per chart review from notes in 03/2021, hx of Afib "post op after CABG. Oral amiodarone discontinued and not on NOAC". Per Cardiology eval 03/2021, decision to not continue NOAC as it was likely provoked.     However, EKG from 01/2020 with Atrial Fibrillation while post-op from CABG while undergoing dialysis.      CHADSVASC score:5    - Recommend systemic anticoagulation. Hold anticoagulation for now as patient is  pending possible RHC.  - Continue rate control with Carvedilol  "

## 2023-05-08 NOTE — ASSESSMENT & PLAN NOTE
57 y.o F w/ hx of CAD s/p CABG LIMA-LAD 01/2020 & PCI  DESx1 to MidCx), T2DM, diastolic and systolic heart failure(EF15%), PAD s/p RSFA and pop intervention 10/2021, then bilateral AKA, PAF, HTN, HLD here with volume overload in the setting of medication nonadherence.    TTE this admission showed EF of 15% (previously 55%), biatrial enlargement, CVP 15, PASP 43, severe tricuspid regurgitation, not noted on prior in 2021  .2, HgA1c 6.2    Intake/Output Summary (Last 24 hours) at 5/8/2023 1324  Last data filed at 5/8/2023 1307  Gross per 24 hour   Intake 720 ml   Output 3325 ml   Net -2605 ml     Net IO Since Admission: -8,410.91 mL [05/08/23 1324]    Patient has improved significantly in the last few days on a Lasix gtt, but remains unable to lie flat at the moment.     Recommendations:  - Continue Furosemide gtt at 30mg/hr  - Continue Bidil hydralazine 70 mg + isosorbide dinitrate 40mg TID  - Strict I/Os, daily goal net negative 2-3L.   - Recommend transfer to cardiology floor nursing unit for close monitoring of I/Os, daily weights  - If patient is able to lie flat for 10 minutes, will consider RHC. Unable to do this today.   - Ok to continue holding anticoagulation  - Continue to monitor cardiorenal dysfunction  - PET stress testing once patient euvolemic, either inpatient or outpatient  - 1.5L fluid restriction, low Na diet  - Optimize GDMT when patient euvolemic and renal function improves

## 2023-05-09 LAB
ANION GAP SERPL CALC-SCNC: 13 MMOL/L (ref 8–16)
BUN SERPL-MCNC: 70 MG/DL (ref 6–20)
CALCIUM SERPL-MCNC: 7.1 MG/DL (ref 8.7–10.5)
CHLORIDE SERPL-SCNC: 100 MMOL/L (ref 95–110)
CO2 SERPL-SCNC: 24 MMOL/L (ref 23–29)
CREAT SERPL-MCNC: 4.9 MG/DL (ref 0.5–1.4)
EST. GFR  (NO RACE VARIABLE): 9.8 ML/MIN/1.73 M^2
GLUCOSE SERPL-MCNC: 91 MG/DL (ref 70–110)
POCT GLUCOSE: 63 MG/DL (ref 70–110)
POCT GLUCOSE: 75 MG/DL (ref 70–110)
POCT GLUCOSE: 77 MG/DL (ref 70–110)
POCT GLUCOSE: 82 MG/DL (ref 70–110)
POTASSIUM SERPL-SCNC: 4.4 MMOL/L (ref 3.5–5.1)
SODIUM SERPL-SCNC: 137 MMOL/L (ref 136–145)

## 2023-05-09 PROCEDURE — 63600175 PHARM REV CODE 636 W HCPCS: Performed by: STUDENT IN AN ORGANIZED HEALTH CARE EDUCATION/TRAINING PROGRAM

## 2023-05-09 PROCEDURE — 99233 PR SUBSEQUENT HOSPITAL CARE,LEVL III: ICD-10-PCS | Mod: ,,, | Performed by: INTERNAL MEDICINE

## 2023-05-09 PROCEDURE — 99232 SBSQ HOSP IP/OBS MODERATE 35: CPT | Mod: GC,,, | Performed by: INTERNAL MEDICINE

## 2023-05-09 PROCEDURE — 99233 SBSQ HOSP IP/OBS HIGH 50: CPT | Mod: ,,, | Performed by: INTERNAL MEDICINE

## 2023-05-09 PROCEDURE — 99232 PR SUBSEQUENT HOSPITAL CARE,LEVL II: ICD-10-PCS | Mod: GC,,, | Performed by: INTERNAL MEDICINE

## 2023-05-09 PROCEDURE — 20600001 HC STEP DOWN PRIVATE ROOM

## 2023-05-09 PROCEDURE — 25000003 PHARM REV CODE 250

## 2023-05-09 PROCEDURE — 99233 PR SUBSEQUENT HOSPITAL CARE,LEVL III: ICD-10-PCS | Mod: GC,,, | Performed by: HOSPITALIST

## 2023-05-09 PROCEDURE — 25000003 PHARM REV CODE 250: Performed by: HOSPITALIST

## 2023-05-09 PROCEDURE — 99233 SBSQ HOSP IP/OBS HIGH 50: CPT | Mod: GC,,, | Performed by: HOSPITALIST

## 2023-05-09 PROCEDURE — 36415 COLL VENOUS BLD VENIPUNCTURE: CPT

## 2023-05-09 PROCEDURE — 80048 BASIC METABOLIC PNL TOTAL CA: CPT

## 2023-05-09 PROCEDURE — 63600175 PHARM REV CODE 636 W HCPCS

## 2023-05-09 PROCEDURE — 25000003 PHARM REV CODE 250: Performed by: STUDENT IN AN ORGANIZED HEALTH CARE EDUCATION/TRAINING PROGRAM

## 2023-05-09 RX ORDER — SODIUM CHLORIDE 0.9 % (FLUSH) 0.9 %
10 SYRINGE (ML) INJECTION
Status: DISCONTINUED | OUTPATIENT
Start: 2023-05-09 | End: 2023-05-24

## 2023-05-09 RX ADMIN — HEPARIN SODIUM 5000 UNITS: 5000 INJECTION INTRAVENOUS; SUBCUTANEOUS at 06:05

## 2023-05-09 RX ADMIN — FUROSEMIDE 30 MG/HR: 10 INJECTION, SOLUTION INTRAMUSCULAR; INTRAVENOUS at 03:05

## 2023-05-09 RX ADMIN — PANTOPRAZOLE SODIUM 40 MG: 40 TABLET, DELAYED RELEASE ORAL at 09:05

## 2023-05-09 RX ADMIN — ALLOPURINOL 100 MG: 100 TABLET ORAL at 09:05

## 2023-05-09 RX ADMIN — HYDRALAZINE HYDROCHLORIDE 75 MG: 50 TABLET ORAL at 02:05

## 2023-05-09 RX ADMIN — INSULIN ASPART 3 UNITS: 100 INJECTION, SOLUTION INTRAVENOUS; SUBCUTANEOUS at 12:05

## 2023-05-09 RX ADMIN — SODIUM BICARBONATE 1300 MG: 650 TABLET ORAL at 02:05

## 2023-05-09 RX ADMIN — SODIUM BICARBONATE 1300 MG: 650 TABLET ORAL at 09:05

## 2023-05-09 RX ADMIN — HEPARIN SODIUM 5000 UNITS: 5000 INJECTION INTRAVENOUS; SUBCUTANEOUS at 02:05

## 2023-05-09 RX ADMIN — CLOPIDOGREL BISULFATE 75 MG: 75 TABLET ORAL at 09:05

## 2023-05-09 RX ADMIN — CARVEDILOL 3.12 MG: 3.12 TABLET, FILM COATED ORAL at 05:05

## 2023-05-09 RX ADMIN — ATORVASTATIN CALCIUM 80 MG: 40 TABLET, FILM COATED ORAL at 09:05

## 2023-05-09 RX ADMIN — ASPIRIN 81 MG: 81 TABLET, COATED ORAL at 09:05

## 2023-05-09 RX ADMIN — ISOSORBIDE DINITRATE 40 MG: 20 TABLET ORAL at 09:05

## 2023-05-09 RX ADMIN — INSULIN ASPART 3 UNITS: 100 INJECTION, SOLUTION INTRAVENOUS; SUBCUTANEOUS at 09:05

## 2023-05-09 RX ADMIN — FUROSEMIDE 30 MG/HR: 10 INJECTION, SOLUTION INTRAMUSCULAR; INTRAVENOUS at 02:05

## 2023-05-09 RX ADMIN — INSULIN DETEMIR 8 UNITS: 100 INJECTION, SOLUTION SUBCUTANEOUS at 09:05

## 2023-05-09 RX ADMIN — CARVEDILOL 3.12 MG: 3.12 TABLET, FILM COATED ORAL at 08:05

## 2023-05-09 RX ADMIN — AMITRIPTYLINE HYDROCHLORIDE 50 MG: 25 TABLET, FILM COATED ORAL at 10:05

## 2023-05-09 RX ADMIN — ISOSORBIDE DINITRATE 40 MG: 20 TABLET ORAL at 02:05

## 2023-05-09 RX ADMIN — HEPARIN SODIUM 5000 UNITS: 5000 INJECTION INTRAVENOUS; SUBCUTANEOUS at 10:05

## 2023-05-09 RX ADMIN — SERTRALINE HYDROCHLORIDE 50 MG: 50 TABLET ORAL at 09:05

## 2023-05-09 RX ADMIN — DOCUSATE SODIUM 100 MG: 100 CAPSULE, LIQUID FILLED ORAL at 09:05

## 2023-05-09 RX ADMIN — HYDRALAZINE HYDROCHLORIDE 75 MG: 50 TABLET ORAL at 09:05

## 2023-05-09 RX ADMIN — INSULIN ASPART 3 UNITS: 100 INJECTION, SOLUTION INTRAVENOUS; SUBCUTANEOUS at 05:05

## 2023-05-09 NOTE — PLAN OF CARE
Problem: Adult Inpatient Plan of Care  Goal: Plan of Care Review  Outcome: Ongoing, Progressing  Goal: Patient-Specific Goal (Individualized)  Outcome: Ongoing, Progressing  Goal: Absence of Hospital-Acquired Illness or Injury  Outcome: Ongoing, Progressing  Goal: Optimal Comfort and Wellbeing  Outcome: Ongoing, Progressing  Goal: Readiness for Transition of Care  Outcome: Ongoing, Progressing     Problem: Fluid and Electrolyte Imbalance (Acute Kidney Injury/Impairment)  Goal: Fluid and Electrolyte Balance  Outcome: Ongoing, Progressing     Problem: Oral Intake Inadequate (Acute Kidney Injury/Impairment)  Goal: Optimal Nutrition Intake  Outcome: Ongoing, Progressing     Problem: Renal Function Impairment (Acute Kidney Injury/Impairment)  Goal: Effective Renal Function  Outcome: Ongoing, Progressing     Problem: Skin Injury Risk Increased  Goal: Skin Health and Integrity  Outcome: Ongoing, Progressing     Problem: Impaired Wound Healing  Goal: Optimal Wound Healing  Outcome: Ongoing, Progressing

## 2023-05-09 NOTE — SUBJECTIVE & OBJECTIVE
Interval History: Patient reports her breathing is slightly better than yesterday, though her urine output has dropped a bit.   CXR suggests edema however BNP and exam have improved.   Ww will plan for a RHC tomorrow with interventional cards.   She is able to lie flat, with or without oxygen support.      Review of Systems   Cardiovascular:  Positive for dyspnea on exertion, leg swelling and orthopnea. Negative for chest pain.   Respiratory:  Positive for shortness of breath. Negative for cough and sputum production.    Gastrointestinal:  Negative for nausea and vomiting.   Genitourinary:  Negative for dysuria and frequency.   Objective:     Vital Signs (Most Recent):  Temp: 98.6 °F (37 °C) (05/09/23 1220)  Pulse: 88 (05/09/23 1220)  Resp: 18 (05/09/23 1220)  BP: (!) 118/56 (05/09/23 1220)  SpO2: 95 % (05/09/23 1220) Vital Signs (24h Range):  Temp:  [97 °F (36.1 °C)-98.6 °F (37 °C)] 98.6 °F (37 °C)  Pulse:  [87-95] 88  Resp:  [16-18] 18  SpO2:  [94 %-98 %] 95 %  BP: (102-129)/(49-64) 118/56     Weight: 90 kg (198 lb 6.6 oz)  Body mass index is 33.02 kg/m².     SpO2: 95 %         Intake/Output Summary (Last 24 hours) at 5/9/2023 1307  Last data filed at 5/9/2023 0511  Gross per 24 hour   Intake 486 ml   Output 850 ml   Net -364 ml         Lines/Drains/Airways       Drain  Duration             Female External Urinary Catheter 05/03/23 0805 6 days              Peripheral Intravenous Line  Duration                  Peripheral IV - Single Lumen 05/05/23 1840 20 G;1 3/4 in Right;Anterior Upper Arm 3 days         Peripheral IV - Single Lumen 05/07/23 0400 22 G Anterior;Right Forearm 2 days                       Physical Exam  Vitals and nursing note reviewed.   Constitutional:       General: She is not in acute distress.     Appearance: She is obese. She is not ill-appearing, toxic-appearing or diaphoretic.   HENT:      Head: Normocephalic and atraumatic.      Mouth/Throat:      Mouth: Mucous membranes are moist.   Eyes:       General: No scleral icterus.        Right eye: No discharge.         Left eye: No discharge.   Neck:      Vascular: JVD present.   Cardiovascular:      Rate and Rhythm: Normal rate and regular rhythm.   Pulmonary:      Effort: Pulmonary effort is normal. No respiratory distress.      Breath sounds: No wheezing.   Abdominal:      General: Bowel sounds are normal. There is distension.      Palpations: Abdomen is soft.      Tenderness: There is abdominal tenderness. There is no guarding.   Musculoskeletal:      Cervical back: No rigidity.      Right lower leg: Edema present.      Left lower leg: Edema present.      Comments: Patient with bilateral AKA. Edema of thighs   Skin:     General: Skin is warm.      Coloration: Skin is not jaundiced.   Neurological:      Mental Status: She is alert and oriented to person, place, and time. Mental status is at baseline.   Psychiatric:         Mood and Affect: Mood normal.         Behavior: Behavior normal.          Significant Labs: CMP   Recent Labs   Lab 05/08/23  0513 05/09/23  0512   * 137   K 4.5 4.4    100   CO2 21* 24   * 91   BUN 64* 70*   CREATININE 4.7* 4.9*   CALCIUM 7.1* 7.1*   ANIONGAP 12 13       Significant Imaging:  Reviewed

## 2023-05-09 NOTE — PROGRESS NOTES
Andrew Andrade - Cardiology [facility] Medicine  Progress Note    Patient Name: Suyapa Connelly  MRN: [id number]  Patient Class: IP- Inpatient   Admission Date: 5/1/2023  Length of Stay: 6 days  Attending Physician: Anna Lopez MD  Primary Care Provider: Magen Christensen MD        Subjective:     Principal Problem:Acute on chronic combined systolic and diastolic heart failure        HPI:  57 y.o. female with past medical history of CAD s/p CABG, CKD, DM, HTN, HLD, s/p bilateral AKA who presnets with chest pain, difficulty breathing, leg swelling.     Per ED She has been unable to access any of her medications for the last 3 months. Over the last few months she has noted worsening shortness of breath, particulatly orthopnea, and leg swelling. In the last two weeks she endorses associated abdominal pain and swelling, reduced appetite, and pleuritic chest pain with deep inspiration. She endorses occasional lighthededness with changes in position. Last week she had an episode where she was unable to breathe for about two minutes which was very upsetting for her and her , but she recovered and did not seek care at that time. Today, she complains of significant difficulty breathing, swelling in her legs and abdomen, and reduced appetite.     No headaches, visual changes, URI symptoms, palpitations, nausea, vomiting, diarrhea, blood in her stool, dysuria, hematuria, numbness or weakness in her extremities. She does endorse occasional paresthesias associated with her chronic phantom limb pain. She additionally has had increased difficulties with depression recently, denies suicidal or homicidal ideation, but has had considerable stress and low mood.      Of note, the patient saw a new primary care provider 4/21 for similar concerns who recommended she proceed to the ED at that time, but they were unable due to financial and geographic concerns. She lives two hours away from [facility] and her  cares for her and  elderly mother with dementia. She has had decreased access to medical care over the last year, and unable to access most of her prescriptions. She had a 90 day supply of four medications (lasix, statin, clopidogrel, sertraline) but these were accidentally lost and she has been unable to afford a replacement. They present today for evaluation and to reestablish care and access to her prescriptions.     Labs on admission significant for an elevated BNP, hypocalcemia which corrected is 8.3, significant elevation in Creatinine and a slight elevation of troponin    CXR  Cardiomegaly with pulmonary interstitial edema, suggestive of pulmonary edema secondary to CHF.      Overview/Hospital Course:  Diuresis initiated with Lasix IV.  1.2L UOP in the past 24 hours.  Vitals reassuring with adequate sats on ambient air.  Still very edematious; Cardiology recs lasix gtt.  GDMT initiated. Pt given 2mg morphine for moderate to severe pain in legs from swelling. Diuresis increased to 40mg/hr due to inadequate urine output. Cr continued to increase. Cr stable at new Lasix rate. Diuresed 1.9L with some improvement in clinical status.      Interval History: Continuing to diurese    Review of Systems   Constitutional:  Negative for chills, fatigue and fever.   HENT:  Negative for congestion, dental problem, facial swelling, postnasal drip, rhinorrhea, sore throat and trouble swallowing.    Eyes:  Negative for photophobia and pain.   Respiratory:  Negative for cough, choking, chest tightness, shortness of breath and wheezing.    Cardiovascular:  Positive for leg swelling. Negative for chest pain.   Gastrointestinal:  Negative for abdominal distention, abdominal pain, diarrhea, nausea and vomiting.   Endocrine: Negative.    Genitourinary:  Negative for dysuria, flank pain, frequency and hematuria.   Musculoskeletal:  Negative for back pain.   Skin:  Negative for pallor and rash.   Allergic/Immunologic: Negative.    Neurological:   Negative for dizziness, syncope, weakness, numbness and headaches.   Psychiatric/Behavioral:  The patient is not nervous/anxious.    Objective:     Vital Signs (Most Recent):  Temp: 97.5 °F (36.4 °C) (05/09/23 1514)  Pulse: 87 (05/09/23 1514)  Resp: 18 (05/09/23 1514)  BP: (!) 92/51 (05/09/23 1514)  SpO2: 99 % (05/09/23 1514) Vital Signs (24h Range):  Temp:  [97.5 °F (36.4 °C)-98.6 °F (37 °C)] 97.5 °F (36.4 °C)  Pulse:  [86-95] 87  Resp:  [16-18] 18  SpO2:  [94 %-99 %] 99 %  BP: ()/(49-61) 92/51     Weight: 90 kg (198 lb 6.6 oz)  Body mass index is 33.02 kg/m².    Intake/Output Summary (Last 24 hours) at 5/9/2023 1622  Last data filed at 5/9/2023 0511  Gross per 24 hour   Intake 486 ml   Output 850 ml   Net -364 ml         Physical Exam  Vitals and nursing note reviewed.   Constitutional:       General: She is not in acute distress.     Appearance: Normal appearance. She is obese. She is not ill-appearing or diaphoretic.   HENT:      Head: Normocephalic and atraumatic.      Right Ear: External ear normal.      Left Ear: External ear normal.      Nose: Nose normal.      Mouth/Throat:      Mouth: Mucous membranes are moist.      Pharynx: Oropharynx is clear.   Eyes:      General: No scleral icterus.     Extraocular Movements: Extraocular movements intact.      Conjunctiva/sclera: Conjunctivae normal.      Pupils: Pupils are equal, round, and reactive to light.   Cardiovascular:      Rate and Rhythm: Normal rate and regular rhythm.      Pulses: Normal pulses.      Heart sounds: Normal heart sounds. No murmur heard.  Pulmonary:      Effort: Pulmonary effort is normal. No respiratory distress.      Breath sounds: Normal breath sounds. No stridor. No wheezing or rhonchi.   Chest:      Chest wall: No tenderness.   Abdominal:      General: Abdomen is flat. There is no distension.      Palpations: Abdomen is soft.      Tenderness: There is no abdominal tenderness. There is no guarding or rebound.   Musculoskeletal:          General: Swelling present. No tenderness. Normal range of motion.      Cervical back: Normal range of motion and neck supple. No rigidity or tenderness.      Right lower leg: Edema present.      Left lower leg: Edema present.      Comments: Trigger finger R middle finger   Skin:     General: Skin is warm and dry.      Capillary Refill: Capillary refill takes less than 2 seconds.      Coloration: Skin is not jaundiced.      Findings: No erythema.   Neurological:      General: No focal deficit present.      Mental Status: She is alert and oriented to person, place, and time.      Cranial Nerves: No cranial nerve deficit.      Motor: No weakness.   Psychiatric:         Mood and Affect: Mood normal.         Behavior: Behavior normal.           Significant Labs: All pertinent labs within the past 24 hours have been reviewed.    Significant Imaging: I have reviewed all pertinent imaging results/findings within the past 24 hours.      Assessment/Plan:      * Acute on chronic combined systolic and diastolic heart failure  Last Echo 6/21 showing     The estimated ejection fraction is 55%.   The left ventricle is normal in size with normal systolic function.   Normal left ventricular diastolic function.   Normal right ventricular size with normal right ventricular systolic function.   Mild tricuspid regurgitation.   The estimated PA systolic pressure is 31 mmHg.   Normal central venous pressure (3 mmHg).    Echo    Result Date: 5/2/2023  · The left ventricle is mildly enlarged with severely decreased systolic   function. The estimated ejection fraction is 15%.  · There is severe left ventricular global hypokinesis.  · Normal right ventricular size with low normal right ventricular systolic   function.  · Grade I left ventricular diastolic dysfunction.  · Biatrial enlargement.  · Mild-to-moderate mitral regurgitation.  · Severe tricuspid regurgitation.  · The estimated PA systolic pressure is 43 mmHg.  · Elevated  central venous pressure (15 mmHg).           -IV Diuresis  -Qq4 BMPs with electrolyte repletion as needed; goal K>4, Mg>2  -Cardiology consult for acute decompensated heart failure with reduced EF   -Transition to lasix 30/hr for daily goal of net negative 2-3L  - Inpatient vs outpatient stress testing once patient euvolemic  - Strict I/Os with daily weights  - 1.5L fluid restriction, low Na diet  - recommend starting GDMT when patient euvolemic and renal function improves  -Tylenol 1g up to 3 times a day        ROSLYN (acute kidney injury)  Pt with increased Cr to 4.7 from 2.0 baseline. Possibly due to renovascular congestion from volume overload.     -Kerns  -Strict Is/Os  -Avoid nephrotoxic agents  -Lasix 30mg/hr  -Daily CMPs        Acute hypoxemic respiratory failure  See Acute on chronic CHF    NPO Midnight  RHC tomorrow  Continue Diuresis  Cardiology recs appreciated    CKD (chronic kidney disease), stage IV  History of CKD. See ROSLYN.      Above-knee amputation of right lower extremity  Prior history consistent with exam      Uses self-applied continuous glucose monitoring device  POCT glucose and daily CMPs    -Restart insulin for hyperglycemia      Dermatitis associated with moisture  Large body habitus with dermatitis to folds. Will monitor for infection.    -Zinc based powder  -Topical abx if indicated      Status post above knee amputation, left  History of.      Hypoalbuminemia  Pt with poor PO intake reported. Pt eats one meal a day and has associated nausea and vomiting. Will try antiemetics before feeds to improve intake. Will monitor daily intake and calorie replacement.     -Calorie count  -Diabetic/Renal diet  -Antiemetics before meals; Zofran 4 IV PRN  -Monitor Qtc for prolongation with antiemetics      Impaired functional mobility and endurance  PT/OT for bedbound patient      Paroxysmal atrial fibrillation  Continue home rate control  NSR on EKG        Anemia  Asymptomatic. Higher than 8 months ago.  No signs of acute bleed at this time. Haptoglobin elevated.    -CBC daily  -Transfuse if <7        S/P CABG x 1  History of. Sternal scar well healed.       CAD (coronary artery disease)  History of CAD with retrosternal chest pain. EKG negative to ST changes. Nonspecific T wave inversions.     -Cardiac tele  -EKG prn  -Bidil per Cards recs      CAROLIN (generalized anxiety disorder)  Continue home anxiety medications      Essential hypertension  Continue home antihypertensives      PAD (peripheral artery disease)  History of bilateral AKA.           VTE Risk Mitigation (From admission, onward)         Ordered     heparin (porcine) injection 5,000 Units  Every 8 hours         05/07/23 1547     IP VTE HIGH RISK PATIENT  Once         05/07/23 1547                Discharge Planning   DEVAN: 5/11/2023     Code Status: Full Code   Is the patient medically ready for discharge?: No    Reason for patient still in hospital (select all that apply): Treatment  Discharge Plan A: Home with family, Home Health   Discharge Delays: None known at this time              Chandrakant Oakley MD  Department of Hospital Medicine   [facility] Andrade - Cardiology Stepdown

## 2023-05-09 NOTE — ASSESSMENT & PLAN NOTE
"Per chart review from notes in 03/2021, hx of Afib "post op after CABG. Oral amiodarone discontinued and not on NOAC".   CHADSVASC score:5    - Recommend systemic anticoagulation. Hold anticoagulation for now as patient is  pending possible RHC.  - Continue rate control with Carvedilol  "

## 2023-05-09 NOTE — ASSESSMENT & PLAN NOTE
57 y.o F w/ hx of CAD s/p CABG LIMA-LAD 01/2020 & PCI  DESx1 to [facility]Cx), T2DM, diastolic and systolic heart failure(EF15%), PAD s/p RSFA and pop intervention 10/2021, then bilateral AKA, PAF, HTN, HLD here with volume overload in the setting of medication nonadherence.    TTE this admission showed EF of 15% (previously 55%), biatrial enlargement, CVP 15, PASP 43, severe tricuspid regurgitation, not noted on prior in 2021  .2, HgA1c 6.2    Intake/Output Summary (Last 24 hours) at 5/9/2023 1309  Last data filed at 5/9/2023 0511  Gross per 24 hour   Intake 486 ml   Output 850 ml   Net -364 ml     Net IO Since Admission: -8,774.91 mL [05/09/23 1309]    Patient has improved significantly in the last few days on a Lasix gtt, today she is able to lie flat for >5 mins.    Recommendations:  - Continue Furosemide gtt at 30mg/hr  - Continue Bidil hydralazine 75 mg + isosorbide dinitrate 40mg TID  - Strict I/Os  - Interventional cardiology consulted for RHC tomorrow  - NPO midnight  - Ok to continue holding anticoagulation  - Continue to monitor cardiorenal dysfunction  - PET stress testing once patient euvolemic, either inpatient or outpatient  - 1.5L fluid restriction, low Na diet  - Optimize GDMT when patient euvolemic and renal function improves

## 2023-05-09 NOTE — ASSESSMENT & PLAN NOTE
- Likely cardiorenal, previously she has required HD  - Hoping to avoid need for HD with ongoing diuresis

## 2023-05-09 NOTE — CONSULTS
[facility] [place]  Interventional Cardiology Consult       Suyapa Connelly  YOB: 1965  Medical Record Number:  [id number]  Attending Physician:  Anna Lopez MD   Date of Admission: 5/1/2023       Hospital Day:  6  Current Principal Problem:  Acute on chronic combined systolic and diastolic heart failure      History     Cc: shortness of breath     HPI  57 y.o. female with past medical history of CAD s/p CABG, CKD, DM, HTN, HLD, s/p bilateral AKA who presnets with chest pain, difficulty breathing, leg swelling. She was admitted to hospital medicine for acute decompensated heart failure. General cardiology consult team consulted and recommended aggressive diuresis. She has improved symptomatically however still edematous and rising creatinine making volume assessment difficult. Interventional cardiology was consulted for right heart catheterization.       Medications - Outpatient  Prior to Admission medications    Medication Sig Start Date End Date Taking? Authorizing Provider   furosemide (LASIX) 40 MG tablet TAKE 1 TABLET(40 MG) BY MOUTH EVERY DAY 3/10/23  Yes Magen Christensen MD   atorvastatin (LIPITOR) 80 MG tablet Take 1 tablet (80 mg total) by mouth once daily. 5/3/23 5/2/24  Flavia Delgado MD   clopidogreL (PLAVIX) 75 mg tablet Take 1 tablet (75 mg total) by mouth once daily. 5/3/23 5/2/24  Flavia Delgado MD   sertraline (ZOLOFT) 50 MG tablet Take 1 tablet (50 mg total) by mouth once daily. 5/3/23 5/2/24  Flavia Delgado MD   sodium bicarbonate 650 MG tablet Take 2 tablets (1,300 mg total) by mouth 3 (three) times daily. 5/2/23 5/1/24  Flavia Delgado MD   lancing device Misc 1 Device by Misc.(Non-Drug; Combo Route) route 2 (two) times daily with meals. 5/7/18 2/24/21  Rosales Barton MD         Medications - Current  Scheduled Meds:   allopurinoL  100 mg Oral Daily    amitriptyline  50 mg Oral QHS    aspirin  81 mg Oral Daily    atorvastatin  80 mg Oral Daily     carvediloL  3.125 mg Oral BID WM    clopidogreL  75 mg Oral Daily    docusate sodium  100 mg Oral BID    heparin (porcine)  5,000 Units Subcutaneous Q8H    hydrALAZINE  75 mg Oral TID    insulin aspart U-100  3 Units Subcutaneous TIDWM    insulin detemir U-100  8 Units Subcutaneous QHS    isosorbide dinitrate  40 mg Oral TID    pantoprazole  40 mg Oral Daily    sertraline  50 mg Oral Daily    sodium bicarbonate  1,300 mg Oral TID     Continuous Infusions:   furosemide (LASIX) 10 mg/mL infusion (non-titrating) 30 mg/hr (05/09/23 0217)     PRN Meds:.acetaminophen, dextrose 10%, dextrose 10%, dextrose, dextrose, glucagon (human recombinant), insulin aspart U-100, melatonin, naloxone, sodium chloride 0.9%      Allergies  Review of patient's allergies indicates:   Allergen Reactions    Ciprofloxacin Itching    Contrast media      Kidney injury    Iodine      Kidney injury         Past Medical History  Past Medical History:   Diagnosis Date    Anxiety     Chronic pain syndrome     CKD (chronic kidney disease), stage III     Depression     Diabetes mellitus     type 2    Diabetes mellitus, type 2     GERD (gastroesophageal reflux disease)     Hyperemesis 3/23/2021    Hyperlipemia     Hypertension     Hypokalemia 3/23/2021    Infection of below knee amputation stump 3/12/2022    Myocardial infarction 2010    minor-caused by stress per pt.    Osteomyelitis     Osteomyelitis of left foot 4/30/2021    PVD (peripheral vascular disease)     Ulcer of left foot     Vaginal delivery     x1         Past Surgical History  Past Surgical History:   Procedure Laterality Date    ABOVE-KNEE AMPUTATION Left 5/18/2021    Procedure: AMPUTATION, ABOVE KNEE;  Surgeon: Teddy Huber MD;  Location: [facility] OR [address] [place] [address];  Service: Vascular;  Laterality: Left;    ABOVE-KNEE AMPUTATION Right 3/18/2022    Procedure: AMPUTATION, ABOVE KNEE;  Surgeon: DAYNE Florez II, MD;  Location: [facility] OR [address] [place] [address];  Service: Vascular;  Laterality: Right;     Angiogram - Right Extremity Right 7/9/15    angiogram-left leg  10/6/15    ANGIOGRAPHY OF LOWER EXTREMITY Left 4/29/2021    Procedure: ANGIOGRAM, LOWER EXTREMITY;  Surgeon: Teddy Huber MD;  Location: [address];  Service: Vascular;  Laterality: Left;    BELOW KNEE AMPUTATION OF LOWER EXTREMITY Right 12/28/2021    Procedure: AMPUTATION, BELOW KNEE;  Surgeon: Kaitlyn Rojas MD;  Location: [facility];  Service: General;  Laterality: Right;    CATHETERIZATION OF BOTH LEFT AND RIGHT HEART N/A 12/18/2019    Procedure: CATHETERIZATION, HEART, BOTH LEFT AND RIGHT;  Surgeon: Que Fernando III, MD;  Location: [facility] CATH LAB;  Service: Cardiology;  Laterality: N/A;    CORONARY ANGIOGRAPHY N/A 12/18/2019    Procedure: ANGIOGRAM, CORONARY ARTERY;  Surgeon: Que Fernando III, MD;  Location: [facility] CATH LAB;  Service: Cardiology;  Laterality: N/A;    CORONARY ANGIOGRAPHY INCLUDING BYPASS GRAFTS WITH CATHETERIZATION OF LEFT HEART N/A 7/28/2020    Procedure: ANGIOGRAM, CORONARY, INCLUDING BYPASS GRAFT, WITH LEFT HEART CATHETERIZATION, 9 am;  Surgeon: Rachel Easley MD;  Location: [facility] CATH LAB;  Service: Cardiology;  Laterality: N/A;    CORONARY ARTERY BYPASS GRAFT (CABG) N/A 1/14/2020    Procedure: CORONARY ARTERY BYPASS GRAFT (CABG) x 1     Off Pump;  Surgeon: Huang Altamirano MD;  Location: [address];  Service: Cardiovascular;  Laterality: N/A;    CREATION OF FEMORAL-TIBIAL ARTERY BYPASS Left 4/29/2021    Procedure: CREATION, BYPASS, ARTERIAL, FEMORAL TO ANTERIOR TIBIAL;  Surgeon: Teddy Huber MD;  Location: [address];  Service: Vascular;  Laterality: Left;    CREATION OF FEMOROPOPLITEAL ARTERIAL BYPASS USING GRAFT Left 8/18/2020    Procedure: CREATION, BYPASS, ARTERIAL, FEMORAL TO POPLITEAL, USING GRAFT, LEFT LOWER EXTREMITY;  Surgeon: Teddy Huber MD;  Location: [facility];  Service: Vascular;  Laterality: Left;  REQUEST 7:15 A.M. START----COVID NEGATIVE ON 8/17  1ST CASE  STARTE PER LEANA ON 8/7/2020 @ 942AM-LO  RN PREOP 8/12/2020   T/S-----CLEARED BY CARDS-------PENDING INSURANCE    DEBRIDEMENT OF FOOT Left 9/8/2020    Procedure: DEBRIDEMENT, FOOT;  Surgeon: Rosio Mayes DPM;  Location: [facility] OR;  Service: Podiatry;  Laterality: Left;  request neoxx .   RN Pre Op 9-4-2020, Covid negative on 9/5/20. C A    DEBRIDEMENT OF FOOT  3/4/2021    Procedure: DEBRIDEMENT, FOOT;  Surgeon: Teddy Huber MD;  Location: [facility] OR;  Service: Vascular;;    DEBRIDEMENT OF FOOT Left 3/9/2021    Procedure: DEBRIDEMENT, FOOT, bone biopsy;  Surgeon: Rosio Mayes DPM;  Location: [facility] OR;  Service: Podiatry;  Laterality: Left;  Request neoxx---COVID IN AM  REQUESTING NOON START  RN Phone Pre op.On Blood thinners Plavix and Eliquis.  Covid am of surgery. C A    DEBRIDEMENT OF FOOT Left 5/4/2021    Procedure: DEBRIDEMENT, FOOT;  Surgeon: Farooq Morley DPM;  [address];  Service: Podiatry;  Laterality: Left;    INSERTION OF TUNNELED CENTRAL VENOUS HEMODIALYSIS CATHETER N/A 1/27/2020    Procedure: Insertion, Catheter, Central Venous, Hemodialysis;  Surgeon: ESTEBAN Gomez III, MD;  Location: [facility] CATH LAB;  Service: Peripheral Vascular;  Laterality: N/A;    PERCUTANEOUS TRANSLUMINAL ANGIOPLASTY N/A 3/4/2021    Procedure: PTA (ANGIOPLASTY, PERCUTANEOUS, TRANSLUMINAL);  Surgeon: Teddy Huber MD;  Location: [facility] OR;  Service: Vascular;  Laterality: N/A;    REMOVAL OF ARTERIOVENOUS GRAFT Left 5/27/2021    Procedure: REMOVAL, GRAFT, LEFT LOWER EXTREMITY, WOUND EXPLORATION;  Surgeon: Teddy Huber MD;  Location: [facility] OR 2ND FLR;  Service: Vascular;  Laterality: Left;    REMOVAL OF NAIL OF DIGIT Left 3/9/2021    Procedure: REMOVAL, NAIL, DIGIT;  Surgeon: Rosio Mayes DPM;  Location: [facility] OR;  Service: Podiatry;  Laterality: Left;    THROMBECTOMY Left 3/4/2021    Procedure: THROMBECTOMY, LEFT LOWER EXTREMITY BYPASS GRAFT, ANGIOGRAM, POSSIBLE INTERVENTION, POSSIBLE  LEFT LOWER EXTREMITY BYPASS;  Surgeon: Teddy Huber MD;  Location: [facility] OR;  Service: Vascular;  Laterality: Left;    THROMBECTOMY Left 4/29/2021    Procedure: GRAFT THROMBECTOMY, LEFT LOWER EXTREMITY;  Surgeon: Teddy Huber MD;  Location: [facility] OR 2ND FLR;  Service: Vascular;  Laterality: Left;  14.5 min  1179.85 mGy  341.01 Gycm2  240 ml dye    THROMBECTOMY  10/22/2021    Procedure: THROMBECTOMY;  Surgeon: Saad Arenas MD;  Location: [facility] CATH LAB/EP;  Service: Cardiology;;         Social History  Social History     Socioeconomic History    Marital status:    Occupational History    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      Social Determinants of Health     Financial Resource Strain: Low Risk     Difficulty of Paying Living Expenses: Not hard at all   Food Insecurity: Unknown    Worried About Running Out of Food in the Last Year: Never true   Transportation Needs: No Transportation Needs    Lack of Transportation (Medical): No    Lack of Transportation (Non-Medical): No   Physical Activity: Inactive    Days of Exercise per Week: 0 days    Minutes of Exercise per Session: 0 min   Stress: Stress Concern Present    Feeling of Stress : Rather much   Social Connections: Moderately Isolated    Frequency of Communication with Friends and Family: Three times a week    Frequency of Social Gatherings with Friends and Family: Three times a week    Attends Sikh Services: Never    Active Member of Clubs or Organizations: No    Attends Club or Organization Meetings: Never    Marital Status:    Housing Stability: Low Risk     Unable to Pay for Housing in the Last Year: No    Number of Places Lived in the Last Year: 2     Unstable Housing in the Last Year: No         ROS  10 point ROS performed and negative except as stated in HPI     Physical Examination         Vital Signs  Vitals  Temp: 98.6 °F (37 °C)  Temp Source: Oral  Pulse: 88  Heart Rate Source: Monitor  Resp: 18  SpO2: 95 %  Pulse Oximetry Type: Continuous  BP: (!) 118/56  MAP (mmHg): 81  BP Location: Right arm  BP Method: Automatic  Patient Position: Lying          24 Hour VS Range    Temp:  [97 °F (36.1 °C)-98.6 °F (37 °C)]   Pulse:  [87-95]   Resp:  [16-18]   BP: (102-129)/(49-64)   SpO2:  [94 %-98 %]     Intake/Output Summary (Last 24 hours) at 5/9/2023 1424  Last data filed at 5/9/2023 0511  Gross per 24 hour   Intake 486 ml   Output 850 ml   Net -364 ml         Physical Exam:   Constitutional: no acute distress  HEENT: NCAT, EOMI, no scleral icterus  Cardiovascular: Regular rate and rhythm, no murmurs appreciated. 2+ carotid, radial, DP pulses bilaterally    Pulmonary: Clear to auscultation anteriorly   Abdomen: nontender, non-distended   Neuro: alert and oriented, no focal deficits  Extremities: warm, bilateral AKA, marked pitting edema to thighs  MSK: no deformities  Integument: intact, no rashes         Data       Recent Labs   Lab 05/03/23  0248 05/04/23  0317   WBC 5.37 5.39   HGB 10.5* 10.4*   HCT 35.0* 34.6*    307        No results for input(s): PROTIME, INR in the last 168 hours.     Recent Labs   Lab 05/05/23  1737 05/06/23  0535 05/07/23  0609 05/08/23  0513 05/09/23  0512    137 135* 135* 137   K 4.4 4.5 4.6 4.5 4.4    105 103 102 100   CO2 21* 21* 21* 21* 24   BUN 54* 57* 62* 64* 70*   CREATININE 4.0* 4.3* 4.4* 4.7* 4.9*   ANIONGAP 10 11 11 12 13   CALCIUM 7.2* 7.1* 7.0* 7.1* 7.1*        Recent Labs   Lab 05/02/23  1924 05/03/23  0849 05/03/23 1952   PROT 6.0 6.4 6.4   ALBUMIN 2.0* 2.2* 2.2*   BILITOT 0.3 0.4 0.3   ALKPHOS 218* 219* 237*   AST 27 22 26   ALT 36 31 30        No results for input(s): TROPONINI in the last 168 hours.      BNP (pg/mL)   Date Value   05/08/2023 286 (H)   05/01/2023 1,063 (H)   03/26/2022 222 (H)   03/11/2022 51   07/04/2021 46       No results for input(s): LABBLOO in the last 168 hours.         Assessment & Plan   57 y.o. female with past medical history of CAD s/p CABG, CKD, DM, HTN, HLD, s/p bilateral AKA who presents to [facility] for acute decompensated heart failure.     Acute decompensated heart failure:   Cardiology team requesting right heart cath for volume assessment.     Proceed with RHC R IJ, 7 Fr sheath with local lidocaine, micropuncture kit and US guidance.     I have explained the risks, benefits and alternatives of the procedure in detail. The patient voices understanding and all questions have been answered,. The patient agrees to proceed as planned.    RHC planned for tomorrow AM.         Edison Shipman MD  [facility]   Cardiovascular Disease PGY-V

## 2023-05-09 NOTE — ASSESSMENT & PLAN NOTE
See Acute on chronic CHF    NPO Midnight  RHC tomorrow  Continue Diuresis  Cardiology recs appreciated

## 2023-05-09 NOTE — PLAN OF CARE
[facility] - Cardiology Stepdown  Discharge Reassessment    Primary Care Provider: Magen Christensen MD    Expected Discharge Date: 5/11/2023    Reassessment (most recent)       Discharge Reassessment - 05/09/23 1535          Discharge Reassessment    Assessment Type Discharge Planning Reassessment     Did the patient's condition or plan change since previous assessment? No     Discharge Plan discussed with: Patient     Communicated DEVAN with patient/caregiver Date not available/Unable to determine     Discharge Plan A Home with family;Home Health     Discharge Plan B Home with family     DME Needed Upon Discharge  bedside commode;lift device;shower chair;power chair     Discharge Barriers Identified None     Why the patient remains in the hospital Requires continued medical care                   CM met at bedside with patient and she stated she is feeling better but continues with fluid in her legs and lower abdomen. She stated she will need some equipment at home upon discharge to help with her care due to bilateral amputations - such as a shower chair , lift device to get from bed into a chair and back, BSC and wants a power chair . Patient unable to verify her actual living address but when her  comes [facility] he will have the actual house address . They just moved to [place], [place]. She verified her Pharmacy as [facility] and no changes since her initial assessment . Patient stated if she needs home health or any rehab would like to go through [facility]. Will continue to monitor with discharge needs.     Yaw Snell RN    [phone]

## 2023-05-09 NOTE — SUBJECTIVE & OBJECTIVE
Interval History: Continuing to diurese    Review of Systems   Constitutional:  Negative for chills, fatigue and fever.   HENT:  Negative for congestion, dental problem, facial swelling, postnasal drip, rhinorrhea, sore throat and trouble swallowing.    Eyes:  Negative for photophobia and pain.   Respiratory:  Negative for cough, choking, chest tightness, shortness of breath and wheezing.    Cardiovascular:  Positive for leg swelling. Negative for chest pain.   Gastrointestinal:  Negative for abdominal distention, abdominal pain, diarrhea, nausea and vomiting.   Endocrine: Negative.    Genitourinary:  Negative for dysuria, flank pain, frequency and hematuria.   Musculoskeletal:  Negative for back pain.   Skin:  Negative for pallor and rash.   Allergic/Immunologic: Negative.    Neurological:  Negative for dizziness, syncope, weakness, numbness and headaches.   Psychiatric/Behavioral:  The patient is not nervous/anxious.    Objective:     Vital Signs (Most Recent):  Temp: 97.5 °F (36.4 °C) (05/09/23 1514)  Pulse: 87 (05/09/23 1514)  Resp: 18 (05/09/23 1514)  BP: (!) 92/51 (05/09/23 1514)  SpO2: 99 % (05/09/23 1514) Vital Signs (24h Range):  Temp:  [97.5 °F (36.4 °C)-98.6 °F (37 °C)] 97.5 °F (36.4 °C)  Pulse:  [86-95] 87  Resp:  [16-18] 18  SpO2:  [94 %-99 %] 99 %  BP: ()/(49-61) 92/51     Weight: 90 kg (198 lb 6.6 oz)  Body mass index is 33.02 kg/m².    Intake/Output Summary (Last 24 hours) at 5/9/2023 1622  Last data filed at 5/9/2023 0511  Gross per 24 hour   Intake 486 ml   Output 850 ml   Net -364 ml         Physical Exam  Vitals and nursing note reviewed.   Constitutional:       General: She is not in acute distress.     Appearance: Normal appearance. She is obese. She is not ill-appearing or diaphoretic.   HENT:      Head: Normocephalic and atraumatic.      Right Ear: External ear normal.      Left Ear: External ear normal.      Nose: Nose normal.      Mouth/Throat:      Mouth: Mucous membranes are moist.       Pharynx: Oropharynx is clear.   Eyes:      General: No scleral icterus.     Extraocular Movements: Extraocular movements intact.      Conjunctiva/sclera: Conjunctivae normal.      Pupils: Pupils are equal, round, and reactive to light.   Cardiovascular:      Rate and Rhythm: Normal rate and regular rhythm.      Pulses: Normal pulses.      Heart sounds: Normal heart sounds. No murmur heard.  Pulmonary:      Effort: Pulmonary effort is normal. No respiratory distress.      Breath sounds: Normal breath sounds. No stridor. No wheezing or rhonchi.   Chest:      Chest wall: No tenderness.   Abdominal:      General: Abdomen is flat. There is no distension.      Palpations: Abdomen is soft.      Tenderness: There is no abdominal tenderness. There is no guarding or rebound.   Musculoskeletal:         General: Swelling present. No tenderness. Normal range of motion.      Cervical back: Normal range of motion and neck supple. No rigidity or tenderness.      Right lower leg: Edema present.      Left lower leg: Edema present.      Comments: Trigger finger R middle finger   Skin:     General: Skin is warm and dry.      Capillary Refill: Capillary refill takes less than 2 seconds.      Coloration: Skin is not jaundiced.      Findings: No erythema.   Neurological:      General: No focal deficit present.      Mental Status: She is alert and oriented to person, place, and time.      Cranial Nerves: No cranial nerve deficit.      Motor: No weakness.   Psychiatric:         Mood and Affect: Mood normal.         Behavior: Behavior normal.           Significant Labs: All pertinent labs within the past 24 hours have been reviewed.    Significant Imaging: I have reviewed all pertinent imaging results/findings within the past 24 hours.

## 2023-05-09 NOTE — CONSULTS
[facility] - Cardiology Stepdown  Wound Care    Patient Name:  Suyapa Connelly   MRN:  [id number]  Date: 5/9/2023  Diagnosis: Acute on chronic combined systolic and diastolic heart failure    History:     Past Medical History:   Diagnosis Date    Anxiety     Chronic pain syndrome     CKD (chronic kidney disease), stage III     Depression     Diabetes mellitus     type 2    Diabetes mellitus, type 2     GERD (gastroesophageal reflux disease)     Hyperemesis 3/23/2021    Hyperlipemia     Hypertension     Hypokalemia 3/23/2021    Infection of below knee amputation stump 3/12/2022    Myocardial infarction 2010    minor-caused by stress per pt.    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      Social Determinants of Health     Financial Resource Strain: Low Risk     Difficulty of Paying Living Expenses: Not hard at all   Food Insecurity: Unknown    Worried About Running Out of Food in the Last Year: Never true   Transportation Needs: No Transportation Needs    Lack of Transportation (Medical): No    Lack of Transportation (Non-Medical): No   Physical Activity: Inactive    Days of Exercise per Week: 0 days    Minutes of Exercise per Session: 0 min   Stress: Stress Concern Present    Feeling of Stress : Rather much   Social Connections: Moderately Isolated    Frequency of Communication with Friends and Family: Three times a week    Frequency of Social Gatherings with Friends and Family: Three times a week    Attends Church  Services: Never    Active Member of Clubs or Organizations: No    Attends Club or Organization Meetings: Never    Marital Status:    Housing Stability: Low Risk     Unable to Pay for Housing in the Last Year: No    Number of Places Lived in the Last Year: 2    Unstable Housing in the Last Year: No       Precautions:     Allergies as of 05/01/2023 - Reviewed 05/01/2023   Allergen Reaction Noted    Ciprofloxacin Itching 07/18/2020    Contrast media  01/10/2020    Iodine  01/10/2020       WOC Assessment Details/Treatment     Wound consult received on skin breakdown to gluteal cleft. Intertrigo due to moisture noted to gluteal cleft. Patient on waffle overlay. Recommend z-guard barrier cream twice a day and as needed. Continue care per nursing. Contact wound care dept for any further needs.     05/09/23 1105        Altered Skin Integrity 05/08/23 1600 medial Gluteal cleft Intertrigo   Date First Assessed/Time First Assessed: 05/08/23 1600   Orientation: medial  Location: Gluteal cleft  Is this injury device related?: No  Primary Wound Type: Intertrigo   Drainage Amount None   Drainage Characteristics/Odor No odor   Appearance Moist;Red   Tissue loss description Partial thickness   Red (%), Wound Tissue Color 100 %   Periwound Area Intact   Wound Edges Open   Wound Length (cm) 3 cm   Wound Width (cm) 0.3 cm   Wound Depth (cm) 0.1 cm   Wound Volume (cm^3) 0.09 cm^3   Wound Surface Area (cm^2) 0.9 cm^2   Care Skin Barrier

## 2023-05-09 NOTE — PROGRESS NOTES
[facility] - [facility] Stepdown  Cardiology  Progress Note    Patient Name: Suyapa Connelly  MRN: [id number]  Admission Date: 5/1/2023  Hospital Length of Stay: 6 days  Code Status: Full Code   Attending Physician: Anna Lopez MD   Primary Care Physician: Magen Christensen MD  Expected Discharge Date: 5/11/2023  Principal Problem:Acute on chronic combined systolic and diastolic heart failure    Subjective:     Interval History: Patient reports her breathing is slightly better than yesterday, though her urine output has dropped a bit.   CXR suggests edema however BNP and exam have improved.   Ww will plan for a RHC tomorrow with interventional cards.   She is able to lie flat, with or without oxygen support.      Review of Systems   Cardiovascular:  Positive for dyspnea on exertion, leg swelling and orthopnea. Negative for chest pain.   Respiratory:  Positive for shortness of breath. Negative for cough and sputum production.    Gastrointestinal:  Negative for nausea and vomiting.   Genitourinary:  Negative for dysuria and frequency.   Objective:     Vital Signs (Most Recent):  Temp: 98.6 °F (37 °C) (05/09/23 1220)  Pulse: 88 (05/09/23 1220)  Resp: 18 (05/09/23 1220)  BP: (!) 118/56 (05/09/23 1220)  SpO2: 95 % (05/09/23 1220) Vital Signs (24h Range):  Temp:  [97 °F (36.1 °C)-98.6 °F (37 °C)] 98.6 °F (37 °C)  Pulse:  [87-95] 88  Resp:  [16-18] 18  SpO2:  [94 %-98 %] 95 %  BP: (102-129)/(49-64) 118/56     Weight: 90 kg (198 lb 6.6 oz)  Body mass index is 33.02 kg/m².     SpO2: 95 %         Intake/Output Summary (Last 24 hours) at 5/9/2023 1307  Last data filed at 5/9/2023 0511  Gross per 24 hour   Intake 486 ml   Output 850 ml   Net -364 ml         Lines/Drains/Airways       Drain  Duration             Female External Urinary Catheter 05/03/23 0805 6 days              Peripheral Intravenous Line  Duration                  Peripheral IV - Single Lumen 05/05/23 1840 20 G;1 3/4 in Right;Anterior Upper Arm 3 days          Peripheral IV - Single Lumen 05/07/23 0400 22 G Anterior;Right Forearm 2 days                       Physical Exam  Vitals and nursing note reviewed.   Constitutional:       General: She is not in acute distress.     Appearance: She is obese. She is not ill-appearing, toxic-appearing or diaphoretic.   HENT:      Head: Normocephalic and atraumatic.      Mouth/Throat:      Mouth: Mucous membranes are moist.   Eyes:      General: No scleral icterus.        Right eye: No discharge.         Left eye: No discharge.   Neck:      Vascular: JVD present.   Cardiovascular:      Rate and Rhythm: Normal rate and regular rhythm.   Pulmonary:      Effort: Pulmonary effort is normal. No respiratory distress.      Breath sounds: No wheezing.   Abdominal:      General: Bowel sounds are normal. There is distension.      Palpations: Abdomen is soft.      Tenderness: There is abdominal tenderness. There is no guarding.   Musculoskeletal:      Cervical back: No rigidity.      Right lower leg: Edema present.      Left lower leg: Edema present.      Comments: Patient with bilateral AKA. Edema of thighs   Skin:     General: Skin is warm.      Coloration: Skin is not jaundiced.   Neurological:      Mental Status: She is alert and oriented to person, place, and time. Mental status is at baseline.   Psychiatric:         Mood and Affect: Mood normal.         Behavior: Behavior normal.          Significant Labs: CMP   Recent Labs   Lab 05/08/23  0513 05/09/23  0512   * 137   K 4.5 4.4    100   CO2 21* 24   * 91   BUN 64* 70*   CREATININE 4.7* 4.9*   CALCIUM 7.1* 7.1*   ANIONGAP 12 13       Significant Imaging:  Reviewed    Assessment and Plan:     * Acute on chronic combined systolic and diastolic heart failure  57 y.o F w/ hx of CAD s/p CABG LIMA-LAD 01/2020 & PCI  DESx1 to MidCx), T2DM, diastolic and systolic heart failure(EF15%), PAD s/p RSFA and pop intervention 10/2021, then bilateral AKA, PAF, HTN, HLD here with volume  "overload in the setting of medication nonadherence.    TTE this admission showed EF of 15% (previously 55%), biatrial enlargement, CVP 15, PASP 43, severe tricuspid regurgitation, not noted on prior in 2021  .2, HgA1c 6.2    Intake/Output Summary (Last 24 hours) at 5/9/2023 1309  Last data filed at 5/9/2023 0511  Gross per 24 hour   Intake 486 ml   Output 850 ml   Net -364 ml     Net IO Since Admission: -8,774.91 mL [05/09/23 1309]    Patient has improved significantly in the last few days on a Lasix gtt, today she is able to lie flat for >5 mins.    Recommendations:  - Continue Furosemide gtt at 30mg/hr  - Continue Bidil hydralazine 75 mg + isosorbide dinitrate 40mg TID  - Strict I/Os  - Interventional cardiology consulted for RHC tomorrow  - NPO midnight  - Ok to continue holding anticoagulation  - Continue to monitor cardiorenal dysfunction  - PET stress testing once patient euvolemic, either inpatient or outpatient  - 1.5L fluid restriction, low Na diet  - Optimize GDMT when patient euvolemic and renal function improves    ROSLYN (acute kidney injury)  - Likely cardiorenal, previously she has required HD  - Hoping to avoid need for HD with ongoing diuresis    Paroxysmal atrial fibrillation  Per chart review from notes in 03/2021, hx of Afib "post op after CABG. Oral amiodarone discontinued and not on NOAC".   CHADSVASC score:5    - Recommend systemic anticoagulation. Hold anticoagulation for now as patient is  pending possible RHC.  - Continue rate control with Carvedilol      We will continue to follow.    VTE Risk Mitigation (From admission, onward)         Ordered     heparin (porcine) injection 5,000 Units  Every 8 hours         05/07/23 1547     IP VTE HIGH RISK PATIENT  Once         05/07/23 1547                Angi Mchugh MD  Cardiology  [facility] - Cardiology Stepdown  "

## 2023-05-10 LAB
ABO + RH BLD: NORMAL
ANION GAP SERPL CALC-SCNC: 15 MMOL/L (ref 8–16)
APTT PPP: 27.5 SEC (ref 21–32)
BASOPHILS # BLD AUTO: 0.07 K/UL (ref 0–0.2)
BASOPHILS NFR BLD: 1.3 % (ref 0–1.9)
BLD GP AB SCN CELLS X3 SERPL QL: NORMAL
BUN SERPL-MCNC: 70 MG/DL (ref 6–20)
CALCIUM SERPL-MCNC: 7.4 MG/DL (ref 8.7–10.5)
CHLORIDE SERPL-SCNC: 98 MMOL/L (ref 95–110)
CO2 SERPL-SCNC: 23 MMOL/L (ref 23–29)
CREAT SERPL-MCNC: 5 MG/DL (ref 0.5–1.4)
DIFFERENTIAL METHOD: ABNORMAL
EOSINOPHIL # BLD AUTO: 0.1 K/UL (ref 0–0.5)
EOSINOPHIL NFR BLD: 2.6 % (ref 0–8)
ERYTHROCYTE [DISTWIDTH] IN BLOOD BY AUTOMATED COUNT: 14.5 % (ref 11.5–14.5)
EST. GFR  (NO RACE VARIABLE): 9.6 ML/MIN/1.73 M^2
GLUCOSE SERPL-MCNC: 93 MG/DL (ref 70–110)
HCT VFR BLD AUTO: 29 % (ref 37–48.5)
HGB BLD-MCNC: 8.6 G/DL (ref 12–16)
IMM GRANULOCYTES # BLD AUTO: 0.01 K/UL (ref 0–0.04)
IMM GRANULOCYTES NFR BLD AUTO: 0.2 % (ref 0–0.5)
INR PPP: 1.1 (ref 0.8–1.2)
LYMPHOCYTES # BLD AUTO: 1.2 K/UL (ref 1–4.8)
LYMPHOCYTES NFR BLD: 22.2 % (ref 18–48)
MCH RBC QN AUTO: 29.9 PG (ref 27–31)
MCHC RBC AUTO-ENTMCNC: 29.7 G/DL (ref 32–36)
MCV RBC AUTO: 101 FL (ref 82–98)
MONOCYTES # BLD AUTO: 0.6 K/UL (ref 0.3–1)
MONOCYTES NFR BLD: 11.3 % (ref 4–15)
NEUTROPHILS # BLD AUTO: 3.3 K/UL (ref 1.8–7.7)
NEUTROPHILS NFR BLD: 62.4 % (ref 38–73)
NRBC BLD-RTO: 0 /100 WBC
PLATELET # BLD AUTO: 348 K/UL (ref 150–450)
PMV BLD AUTO: 10.5 FL (ref 9.2–12.9)
POCT GLUCOSE: 101 MG/DL (ref 70–110)
POCT GLUCOSE: 102 MG/DL (ref 70–110)
POCT GLUCOSE: 88 MG/DL (ref 70–110)
POCT GLUCOSE: 96 MG/DL (ref 70–110)
POTASSIUM SERPL-SCNC: 4.2 MMOL/L (ref 3.5–5.1)
PROTHROMBIN TIME: 11.3 SEC (ref 9–12.5)
RBC # BLD AUTO: 2.88 M/UL (ref 4–5.4)
SODIUM SERPL-SCNC: 136 MMOL/L (ref 136–145)
SPECIMEN OUTDATE: NORMAL
WBC # BLD AUTO: 5.31 K/UL (ref 3.9–12.7)

## 2023-05-10 PROCEDURE — 99233 SBSQ HOSP IP/OBS HIGH 50: CPT | Mod: GC,,, | Performed by: HOSPITALIST

## 2023-05-10 PROCEDURE — C1894 INTRO/SHEATH, NON-LASER: HCPCS | Performed by: INTERNAL MEDICINE

## 2023-05-10 PROCEDURE — 99232 PR SUBSEQUENT HOSPITAL CARE,LEVL II: ICD-10-PCS | Mod: GC,,, | Performed by: INTERNAL MEDICINE

## 2023-05-10 PROCEDURE — 25000003 PHARM REV CODE 250: Performed by: STUDENT IN AN ORGANIZED HEALTH CARE EDUCATION/TRAINING PROGRAM

## 2023-05-10 PROCEDURE — 63600175 PHARM REV CODE 636 W HCPCS

## 2023-05-10 PROCEDURE — 25000003 PHARM REV CODE 250

## 2023-05-10 PROCEDURE — 25000003 PHARM REV CODE 250: Performed by: HOSPITALIST

## 2023-05-10 PROCEDURE — 25000003 PHARM REV CODE 250: Performed by: INTERNAL MEDICINE

## 2023-05-10 PROCEDURE — 85610 PROTHROMBIN TIME: CPT | Performed by: INTERNAL MEDICINE

## 2023-05-10 PROCEDURE — 20600001 HC STEP DOWN PRIVATE ROOM

## 2023-05-10 PROCEDURE — 86900 BLOOD TYPING SEROLOGIC ABO: CPT | Performed by: INTERNAL MEDICINE

## 2023-05-10 PROCEDURE — 93451 PR RIGHT HEART CATH O2 SATURATION & CARDIAC OUTPUT: ICD-10-PCS | Mod: 26,GC,, | Performed by: INTERNAL MEDICINE

## 2023-05-10 PROCEDURE — C1769 GUIDE WIRE: HCPCS | Performed by: INTERNAL MEDICINE

## 2023-05-10 PROCEDURE — 63600175 PHARM REV CODE 636 W HCPCS: Performed by: STUDENT IN AN ORGANIZED HEALTH CARE EDUCATION/TRAINING PROGRAM

## 2023-05-10 PROCEDURE — 85730 THROMBOPLASTIN TIME PARTIAL: CPT | Performed by: INTERNAL MEDICINE

## 2023-05-10 PROCEDURE — 80048 BASIC METABOLIC PNL TOTAL CA: CPT

## 2023-05-10 PROCEDURE — 99232 SBSQ HOSP IP/OBS MODERATE 35: CPT | Mod: GC,,, | Performed by: INTERNAL MEDICINE

## 2023-05-10 PROCEDURE — 85025 COMPLETE CBC W/AUTO DIFF WBC: CPT | Performed by: INTERNAL MEDICINE

## 2023-05-10 PROCEDURE — C1751 CATH, INF, PER/CENT/MIDLINE: HCPCS | Performed by: INTERNAL MEDICINE

## 2023-05-10 PROCEDURE — 63600175 PHARM REV CODE 636 W HCPCS: Performed by: INTERNAL MEDICINE

## 2023-05-10 PROCEDURE — 99233 PR SUBSEQUENT HOSPITAL CARE,LEVL III: ICD-10-PCS | Mod: GC,,, | Performed by: HOSPITALIST

## 2023-05-10 PROCEDURE — 93451 RIGHT HEART CATH: CPT | Mod: 26,GC,, | Performed by: INTERNAL MEDICINE

## 2023-05-10 PROCEDURE — 93451 RIGHT HEART CATH: CPT | Performed by: INTERNAL MEDICINE

## 2023-05-10 DEVICE — CATH BLUE FLEXTIP TL 7FR: Type: IMPLANTABLE DEVICE | Site: HEART | Status: FUNCTIONAL

## 2023-05-10 RX ORDER — HEPARIN SOD,PORCINE/0.9 % NACL 1000/500ML
INTRAVENOUS SOLUTION INTRAVENOUS
Status: DISCONTINUED | OUTPATIENT
Start: 2023-05-10 | End: 2023-05-12

## 2023-05-10 RX ORDER — ACETAMINOPHEN 325 MG/1
650 TABLET ORAL EVERY 4 HOURS PRN
Status: DISCONTINUED | OUTPATIENT
Start: 2023-05-10 | End: 2023-05-12

## 2023-05-10 RX ORDER — LIDOCAINE HYDROCHLORIDE 20 MG/ML
INJECTION, SOLUTION INFILTRATION; PERINEURAL
Status: DISCONTINUED | OUTPATIENT
Start: 2023-05-10 | End: 2023-05-12

## 2023-05-10 RX ORDER — MIDAZOLAM HYDROCHLORIDE 1 MG/ML
INJECTION, SOLUTION INTRAMUSCULAR; INTRAVENOUS
Status: DISCONTINUED | OUTPATIENT
Start: 2023-05-10 | End: 2023-05-12

## 2023-05-10 RX ORDER — ONDANSETRON 4 MG/1
8 TABLET, ORALLY DISINTEGRATING ORAL EVERY 8 HOURS PRN
Status: DISCONTINUED | OUTPATIENT
Start: 2023-05-10 | End: 2023-05-29 | Stop reason: HOSPADM

## 2023-05-10 RX ADMIN — CLOPIDOGREL BISULFATE 75 MG: 75 TABLET ORAL at 04:05

## 2023-05-10 RX ADMIN — ATORVASTATIN CALCIUM 80 MG: 40 TABLET, FILM COATED ORAL at 04:05

## 2023-05-10 RX ADMIN — AMITRIPTYLINE HYDROCHLORIDE 50 MG: 25 TABLET, FILM COATED ORAL at 11:05

## 2023-05-10 RX ADMIN — SODIUM BICARBONATE 1300 MG: 650 TABLET ORAL at 09:05

## 2023-05-10 RX ADMIN — ISOSORBIDE DINITRATE 40 MG: 20 TABLET ORAL at 09:05

## 2023-05-10 RX ADMIN — DOCUSATE SODIUM 100 MG: 100 CAPSULE, LIQUID FILLED ORAL at 04:05

## 2023-05-10 RX ADMIN — HEPARIN SODIUM 5000 UNITS: 5000 INJECTION INTRAVENOUS; SUBCUTANEOUS at 04:05

## 2023-05-10 RX ADMIN — HYDRALAZINE HYDROCHLORIDE 75 MG: 50 TABLET ORAL at 04:05

## 2023-05-10 RX ADMIN — CARVEDILOL 3.12 MG: 3.12 TABLET, FILM COATED ORAL at 04:05

## 2023-05-10 RX ADMIN — FUROSEMIDE 30 MG/HR: 10 INJECTION, SOLUTION INTRAMUSCULAR; INTRAVENOUS at 05:05

## 2023-05-10 RX ADMIN — INSULIN DETEMIR 8 UNITS: 100 INJECTION, SOLUTION SUBCUTANEOUS at 10:05

## 2023-05-10 RX ADMIN — ASPIRIN 81 MG: 81 TABLET, COATED ORAL at 04:05

## 2023-05-10 RX ADMIN — HEPARIN SODIUM 5000 UNITS: 5000 INJECTION INTRAVENOUS; SUBCUTANEOUS at 06:05

## 2023-05-10 RX ADMIN — ACETAMINOPHEN 1000 MG: 500 TABLET ORAL at 06:05

## 2023-05-10 RX ADMIN — ALLOPURINOL 100 MG: 100 TABLET ORAL at 04:05

## 2023-05-10 RX ADMIN — SODIUM BICARBONATE 1300 MG: 650 TABLET ORAL at 04:05

## 2023-05-10 RX ADMIN — ISOSORBIDE DINITRATE 40 MG: 20 TABLET ORAL at 04:05

## 2023-05-10 RX ADMIN — PANTOPRAZOLE SODIUM 40 MG: 40 TABLET, DELAYED RELEASE ORAL at 04:05

## 2023-05-10 RX ADMIN — DOCUSATE SODIUM 100 MG: 100 CAPSULE, LIQUID FILLED ORAL at 09:05

## 2023-05-10 RX ADMIN — HYDRALAZINE HYDROCHLORIDE 75 MG: 50 TABLET ORAL at 09:05

## 2023-05-10 RX ADMIN — HEPARIN SODIUM 5000 UNITS: 5000 INJECTION INTRAVENOUS; SUBCUTANEOUS at 10:05

## 2023-05-10 RX ADMIN — SERTRALINE HYDROCHLORIDE 50 MG: 50 TABLET ORAL at 04:05

## 2023-05-10 NOTE — PROGRESS NOTES
[facility] Step[facility]  Cardiology  Progress Note    Patient Name: Suyapa Connelly  MRN: [id number]  Admission Date: 5/1/2023  Hospital Length of Stay: 7 days  Code Status: Full Code   Attending Physician: Anna Lopez MD   Primary Care Physician: Magen Christensen MD  Expected Discharge Date: 5/11/2023  Principal Problem:Acute on chronic combined systolic and diastolic heart failure    Subjective:     Interval History: Patient reports her breathing is slightly better than yesterday, urine output in last day only 1L.   Renal function stable to worsening.    Pending RHC today.    Review of Systems   Cardiovascular:  Positive for dyspnea on exertion, leg swelling and orthopnea. Negative for chest pain.   Respiratory:  Positive for shortness of breath. Negative for cough and sputum production.    Gastrointestinal:  Negative for nausea and vomiting.   Genitourinary:  Negative for dysuria and frequency.   Objective:     Vital Signs (Most Recent):  Temp: 97.3 °F (36.3 °C) (05/10/23 1114)  Pulse: 92 (05/10/23 1123)  Resp: 16 (05/10/23 1114)  BP: 122/61 (05/10/23 1114)  SpO2: 98 % (05/10/23 1114) Vital Signs (24h Range):  Temp:  [97.3 °F (36.3 °C)-98.4 °F (36.9 °C)] 97.3 °F (36.3 °C)  Pulse:  [84-92] 92  Resp:  [16-20] 16  SpO2:  [96 %-99 %] 98 %  BP: ()/(47-69) 122/61     Weight: 91.2 kg (201 lb 1 oz)  Body mass index is 33.46 kg/m².     SpO2: 98 %         Intake/Output Summary (Last 24 hours) at 5/10/2023 1247  Last data filed at 5/10/2023 1244  Gross per 24 hour   Intake 702 ml   Output 1026 ml   Net -324 ml         Lines/Drains/Airways       Drain  Duration             Female External Urinary Catheter 05/03/23 0805 7 days                       Physical Exam  Vitals and nursing note reviewed.   Constitutional:       General: She is not in acute distress.     Appearance: She is obese. She is not ill-appearing, toxic-appearing or diaphoretic.   HENT:      Head: Normocephalic and atraumatic.       Mouth/Throat:      Mouth: Mucous membranes are moist.   Eyes:      General: No scleral icterus.        Right eye: No discharge.         Left eye: No discharge.   Neck:      Vascular: JVD present.   Cardiovascular:      Rate and Rhythm: Normal rate and regular rhythm.   Pulmonary:      Effort: Pulmonary effort is normal. No respiratory distress.      Breath sounds: No wheezing.   Abdominal:      General: Bowel sounds are normal. There is distension.      Palpations: Abdomen is soft.      Tenderness: There is abdominal tenderness. There is no guarding.   Musculoskeletal:      Cervical back: No rigidity.      Right lower leg: Edema present.      Left lower leg: Edema present.      Comments: Patient with bilateral AKA. Minimal edema of thighs   Skin:     General: Skin is warm.      Coloration: Skin is not jaundiced.   Neurological:      Mental Status: She is alert and oriented to person, place, and time. Mental status is at baseline.   Psychiatric:         Mood and Affect: Mood normal.         Behavior: Behavior normal.          Significant Labs: CMP   Recent Labs   Lab 05/09/23  0512 05/10/23  0501    136   K 4.4 4.2    98   CO2 24 23   GLU 91 93   BUN 70* 70*   CREATININE 4.9* 5.0*   CALCIUM 7.1* 7.4*   ANIONGAP 13 15       Significant Imaging:  Reviewed    Assessment and Plan:     * Acute on chronic combined systolic and diastolic heart failure  57 y.o F w/ hx of CAD s/p CABG LIMA-LAD 01/2020 & PCI  DESx1 to MidCx), T2DM, diastolic and systolic heart failure(EF15%), PAD s/p RSFA and pop intervention 10/2021, then bilateral AKA, PAF, HTN, HLD here with volume overload in the setting of medication nonadherence.    TTE this admission showed EF of 15% (previously 55%), biatrial enlargement, CVP 15, PASP 43, severe tricuspid regurgitation, not noted on prior in 2021  .2, HgA1c 6.2    Intake/Output Summary (Last 24 hours) at 5/10/2023 1248  Last data filed at 5/10/2023 1244  Gross per 24 hour   Intake  "702 ml   Output 1026 ml   Net -324 ml     Net IO Since Admission: -8,498.91 mL [05/10/23 1248]    Patient has improved significantly in the last few days on a Lasix gtt, she is able to lie flat for >5 mins.     Recommendations:  - Continue Furosemide gtt at 30mg/hr  - Continue Bidil hydralazine 75 mg + isosorbide dinitrate 40mg TID  - Strict I/Os  - Interventional cardiology to perform RHC today  - If wedge remains elevated, will continue aggressive diuresis and involve Nephrology for consideration of dialysis  - Consult nephrology today  - PET stress testing once patient euvolemic, either inpatient or outpatient  - 1.5L fluid restriction, low Na diet  - Optimize GDMT when patient euvolemic and renal function improves    ROSLYN (acute kidney injury)  - Likely cardiorenal, previously she has required HD  - Hoping to avoid need for HD with ongoing diuresis  - Consult nephrology     Paroxysmal atrial fibrillation  Per chart review from notes in 03/2021, hx of Afib "post op after CABG. Oral amiodarone discontinued and not on NOAC".   CHADSVASC score:5    - Sfter RHC, start Eliquis 5mg BID and continue clopidogrel, stop aspirin  - Continue rate control with Carvedilol      We will continue to follow    VTE Risk Mitigation (From admission, onward)         Ordered     heparin (porcine) injection 5,000 Units  Every 8 hours         05/07/23 1547     IP VTE HIGH RISK PATIENT  Once         05/07/23 1547                Angi Mchugh MD  Cardiology  [facility] - [facility]  "

## 2023-05-10 NOTE — NURSING
Pt's IV removed due to being infiltrated. Primary RN attempted, no success. Charge RN notified. Lasix gtt held.

## 2023-05-10 NOTE — PROGRESS NOTES
[facility] Medicine  Progress Note    Patient Name: Suyapa Connelly  MRN: [id number]  Patient Class: IP- Inpatient   Admission Date: 5/1/2023  Length of Stay: 7 days  Attending Physician: Anna Lopez MD  Primary Care Provider: Magen Christensen MD        Subjective:     Principal Problem:Acute on chronic combined systolic and diastolic heart failure        HPI:  57 y.o. female with past medical history of CAD s/p CABG, CKD, DM, HTN, HLD, s/p bilateral AKA who presnets with chest pain, difficulty breathing, leg swelling.     Per ED She has been unable to access any of her medications for the last 3 months. Over the last few months she has noted worsening shortness of breath, particulatly orthopnea, and leg swelling. In the last two weeks she endorses associated abdominal pain and swelling, reduced appetite, and pleuritic chest pain with deep inspiration. She endorses occasional lighthededness with changes in position. Last week she had an episode where she was unable to breathe for about two minutes which was very upsetting for her and her , but she recovered and did not seek care at that time. Today, she complains of significant difficulty breathing, swelling in her legs and abdomen, and reduced appetite.     No headaches, visual changes, URI symptoms, palpitations, nausea, vomiting, diarrhea, blood in her stool, dysuria, hematuria, numbness or weakness in her extremities. She does endorse occasional paresthesias associated with her chronic phantom limb pain. She additionally has had increased difficulties with depression recently, denies suicidal or homicidal ideation, but has had considerable stress and low mood.      Of note, the patient saw a new primary care provider 4/21 for similar concerns who recommended she proceed to the ED at that time, but they were unable due to financial and geographic concerns. She lives two hours away from [facility] and her  cares for her and elderly  mother with dementia. She has had decreased access to medical care over the last year, and unable to access most of her prescriptions. She had a 90 day supply of four medications (lasix, statin, clopidogrel, sertraline) but these were accidentally lost and she has been unable to afford a replacement. They present today for evaluation and to reestablish care and access to her prescriptions.     Labs on admission significant for an elevated BNP, hypocalcemia which corrected is 8.3, significant elevation in Creatinine and a slight elevation of troponin    CXR  Cardiomegaly with pulmonary interstitial edema, suggestive of pulmonary edema secondary to CHF.      Overview/Hospital Course:  Diuresis initiated with Lasix IV.  1.2L UOP in the past 24 hours.  Vitals reassuring with adequate sats on ambient air.  Still very edematious; Cardiology recs lasix gtt.  GDMT initiated. Pt given 2mg morphine for moderate to severe pain in legs from swelling. Diuresis increased to 40mg/hr due to inadequate urine output. Cr continued to increase. Cr stable at new Lasix rate. Diuresed 1.9L with some improvement in clinical status.      Interval History: Pt to go for RHC today. Will add eliquis 5mg bid plus plavix and will discontinue ASA. Pt still having pain in the lower extremities from fluid. No pain from sacral ulcer. Stage 2 currently.     Review of Systems   Constitutional:  Negative for chills, fatigue and fever.   HENT:  Negative for congestion, dental problem, facial swelling, postnasal drip, rhinorrhea, sore throat and trouble swallowing.    Eyes:  Negative for photophobia and pain.   Respiratory:  Negative for cough, choking, chest tightness, shortness of breath and wheezing.    Cardiovascular:  Positive for leg swelling. Negative for chest pain.   Gastrointestinal:  Negative for abdominal distention, abdominal pain, diarrhea, nausea and vomiting.   Endocrine: Negative.    Genitourinary:  Negative for dysuria, flank pain,  frequency and hematuria.   Musculoskeletal:  Negative for back pain.   Skin:  Negative for pallor and rash.   Allergic/Immunologic: Negative.    Neurological:  Negative for dizziness, syncope, weakness, numbness and headaches.   Psychiatric/Behavioral:  The patient is not nervous/anxious.    Objective:     Vital Signs (Most Recent):  Temp: 97.3 °F (36.3 °C) (05/10/23 1114)  Pulse: 92 (05/10/23 1123)  Resp: 16 (05/10/23 1114)  BP: 122/61 (05/10/23 1114)  SpO2: 98 % (05/10/23 1114) Vital Signs (24h Range):  Temp:  [97.3 °F (36.3 °C)-98.4 °F (36.9 °C)] 97.3 °F (36.3 °C)  Pulse:  [84-92] 92  Resp:  [16-20] 16  SpO2:  [96 %-99 %] 98 %  BP: ()/(47-69) 122/61     Weight: 91.2 kg (201 lb 1 oz)  Body mass index is 33.46 kg/m².    Intake/Output Summary (Last 24 hours) at 5/10/2023 1308  Last data filed at 5/10/2023 1244  Gross per 24 hour   Intake 702 ml   Output 1026 ml   Net -324 ml         Physical Exam  Vitals and nursing note reviewed.   Constitutional:       General: She is not in acute distress.     Appearance: Normal appearance. She is obese. She is not ill-appearing or diaphoretic.   HENT:      Head: Normocephalic and atraumatic.      Right Ear: External ear normal.      Left Ear: External ear normal.      Nose: Nose normal.      Mouth/Throat:      Mouth: Mucous membranes are moist.      Pharynx: Oropharynx is clear.   Eyes:      General: No scleral icterus.     Extraocular Movements: Extraocular movements intact.      Conjunctiva/sclera: Conjunctivae normal.      Pupils: Pupils are equal, round, and reactive to light.   Cardiovascular:      Rate and Rhythm: Normal rate and regular rhythm.      Pulses: Normal pulses.      Heart sounds: Normal heart sounds. No murmur heard.  Pulmonary:      Effort: Pulmonary effort is normal. No respiratory distress.      Breath sounds: Normal breath sounds. No stridor. No wheezing or rhonchi.   Chest:      Chest wall: No tenderness.   Abdominal:      General: Abdomen is flat.  There is no distension.      Palpations: Abdomen is soft.      Tenderness: There is no abdominal tenderness. There is no guarding or rebound.   Musculoskeletal:         General: Swelling present. No tenderness. Normal range of motion.      Cervical back: Normal range of motion and neck supple. No rigidity or tenderness.      Right lower leg: Edema present.      Left lower leg: Edema present.      Comments: Trigger finger R middle finger   Skin:     General: Skin is warm and dry.      Capillary Refill: Capillary refill takes less than 2 seconds.      Coloration: Skin is not jaundiced.      Findings: No erythema.   Neurological:      General: No focal deficit present.      Mental Status: She is alert and oriented to person, place, and time.      Cranial Nerves: No cranial nerve deficit.      Motor: No weakness.   Psychiatric:         Mood and Affect: Mood normal.         Behavior: Behavior normal.           Significant Labs: All pertinent labs within the past 24 hours have been reviewed.    Significant Imaging: I have reviewed all pertinent imaging results/findings within the past 24 hours.      Assessment/Plan:      * Acute on chronic combined systolic and diastolic heart failure  Last Echo 6/21 showing     The estimated ejection fraction is 55%.   The left ventricle is normal in size with normal systolic function.   Normal left ventricular diastolic function.   Normal right ventricular size with normal right ventricular systolic function.   Mild tricuspid regurgitation.   The estimated PA systolic pressure is 31 mmHg.   Normal central venous pressure (3 mmHg).    Echo    Result Date: 5/2/2023  · The left ventricle is mildly enlarged with severely decreased systolic   function. The estimated ejection fraction is 15%.  · There is severe left ventricular global hypokinesis.  · Normal right ventricular size with low normal right ventricular systolic   function.  · Grade I left ventricular diastolic  dysfunction.  · Biatrial enlargement.  · Mild-to-moderate mitral regurgitation.  · Severe tricuspid regurgitation.  · The estimated PA systolic pressure is 43 mmHg.  · Elevated central venous pressure (15 mmHg).         RHC  CVP 15  Wedge 25-32      -IV Diuresis with Lasix  -Add metolazone 5mg  -Consult nephrology  -Qq4 BMPs with electrolyte repletion as needed; goal K>4, Mg>2  -Cardiology consult for acute decompensated heart failure with reduced EF   -Transition to lasix 30/hr for daily goal of net negative 2-3L  - Inpatient vs outpatient stress testing once patient euvolemic  - Strict I/Os with daily weights  - 1.5L fluid restriction, low Na diet  - recommend starting GDMT when patient euvolemic and renal function improves  -Tylenol 1g up to 3 times a day        ROSLYN (acute kidney injury)  Pt with increased Cr to 4.7 from 2.0 baseline. Possibly due to renovascular congestion from volume overload.     -Kerns  -Strict Is/Os  -Avoid nephrotoxic agents  -Lasix 30mg/hr  -Daily CMPs        Acute hypoxemic respiratory failure  See Acute on chronic CHF    NPO Midnight  RHC tomorrow  Continue Diuresis  Cardiology recs appreciated    CKD (chronic kidney disease), stage IV  History of CKD. See ROSLYN.      Above-knee amputation of right lower extremity  Prior history consistent with exam      Uses self-applied continuous glucose monitoring device  POCT glucose and daily CMPs    -Restart insulin for hyperglycemia      Dermatitis associated with moisture  Large body habitus with dermatitis to folds. Will monitor for infection.    -Zinc based powder  -Topical abx if indicated      Status post above knee amputation, left  History of.      Hypoalbuminemia  Pt with poor PO intake reported. Pt eats one meal a day and has associated nausea and vomiting. Will try antiemetics before feeds to improve intake. Will monitor daily intake and calorie replacement.     -Calorie count  -Diabetic/Renal diet  -Antiemetics before meals; Zofran 4 IV  PRN  -Monitor Qtc for prolongation with antiemetics      Impaired functional mobility and endurance  PT/OT for bedbound patient      Paroxysmal atrial fibrillation  Continue home rate control  NSR on EKG        Anemia  Asymptomatic. Higher than 8 months ago. No signs of acute bleed at this time. Haptoglobin elevated.    -CBC daily  -Transfuse if <7        S/P CABG x 1  History of. Sternal scar well healed.       CAD (coronary artery disease)  History of CAD with retrosternal chest pain. EKG negative to ST changes. Nonspecific T wave inversions.     -Cardiac tele  -EKG prn  -Bidil per Cards recs      CAROLIN (generalized anxiety disorder)  Continue home anxiety medications      Essential hypertension  Continue home antihypertensives      PAD (peripheral artery disease)  History of bilateral AKA.           VTE Risk Mitigation (From admission, onward)         Ordered     heparin infusion 1,000 units/500 ml in 0.9% NaCl (on sterile field)  As needed (PRN)         05/10/23 1409     heparin (porcine) injection 5,000 Units  Every 8 hours         05/07/23 1547     IP VTE HIGH RISK PATIENT  Once         05/07/23 1547                Discharge Planning   DEVAN: 5/11/2023     Code Status: Full Code   Is the patient medically ready for discharge?: No    Reason for patient still in hospital (select all that apply): Patient trending condition and Treatment  Discharge Plan A: Home with family, Home Health   Discharge Delays: None known at this time      Onur Bueno MD  Department of Hospital Medicine   [facility]

## 2023-05-10 NOTE — ASSESSMENT & PLAN NOTE
57 y.o F w/ hx of CAD s/p CABG LIMA-LAD 01/2020 & PCI  DESx1 to MidCx), T2DM, diastolic and systolic heart failure(EF15%), PAD s/p RSFA and pop intervention 10/2021, then bilateral AKA, PAF, HTN, HLD here with volume overload in the setting of medication nonadherence.    TTE this admission showed EF of 15% (previously 55%), biatrial enlargement, CVP 15, PASP 43, severe tricuspid regurgitation, not noted on prior in 2021  .2, HgA1c 6.2    Intake/Output Summary (Last 24 hours) at 5/10/2023 1248  Last data filed at 5/10/2023 1244  Gross per 24 hour   Intake 702 ml   Output 1026 ml   Net -324 ml     Net IO Since Admission: -8,498.91 mL [05/10/23 1248]    Patient has improved significantly in the last few days on a Lasix gtt, she is able to lie flat for >5 mins.     Recommendations:  - Continue Furosemide gtt at 30mg/hr  - Continue Bidil hydralazine 75 mg + isosorbide dinitrate 40mg TID  - Strict I/Os  - Interventional cardiology to perform RHC today  - If wedge remains elevated, will continue aggressive diuresis and involve Nephrology for consideration of dialysis  - Consult nephrology today  - PET stress testing once patient euvolemic, either inpatient or outpatient  - 1.5L fluid restriction, low Na diet  - Optimize GDMT when patient euvolemic and renal function improves

## 2023-05-10 NOTE — ASSESSMENT & PLAN NOTE
- Likely cardiorenal, previously she has required HD  - Hoping to avoid need for HD with ongoing diuresis  - Consult nephrology

## 2023-05-10 NOTE — PT/OT/SLP PROGRESS
Occupational Therapy  Contact Note/Missed Treatment      Patient Name:  Suyapa Connelly   MRN:  [id number]    Contact Time(s): [id number]    Patient not seen today secondary to Off the floor for procedure/surgery. Pt agreeable to session, however during setup for session transport arriving to bring pt to cath lab. Will follow-up as appropriate.    5/10/2023

## 2023-05-10 NOTE — BRIEF OP NOTE
Brief Operative Note:    : Romulo Queen MD     Referring Physician: Self,Aaareferral     All Operators: Surgeon(s):  MD Edison Witt MD Khaldia Khaled, MD     Preoperative Diagnosis: CHF (congestive heart failure) [I50.9]     Postop Diagnosis: CHF (congestive heart failure) [I50.9]    Treatments/Procedures: Procedure(s) (LRB):  INSERTION, CATHETER, RIGHT HEART (Right)    Access:  Left IJ (Right IJ attempted but appears occluded)    Findings:   See catheterization report for full details)    Intervention: None     Closure device:  None, introducer left in place with triple lumen MAC        Plan:  - Post cath protocol   - Bed rest x 2 hours  - Risk factor reduction (BP <130/80 mmHg, glycemic control, etc)  - Cardiac rehab referral  - Follow up with outpatient cardiologist    Estimated Blood loss: 20 cc    Specimens removed: No    Edison Shipman MD  [facility]  Cardiovascular Disease, PGY-V

## 2023-05-10 NOTE — SUBJECTIVE & OBJECTIVE
Interval History: Patient reports her breathing is slightly better than yesterday, urine output in last day only 1L.   Renal function stable to worsening.    Pending RHC today.    Review of Systems   Cardiovascular:  Positive for dyspnea on exertion, leg swelling and orthopnea. Negative for chest pain.   Respiratory:  Positive for shortness of breath. Negative for cough and sputum production.    Gastrointestinal:  Negative for nausea and vomiting.   Genitourinary:  Negative for dysuria and frequency.   Objective:     Vital Signs (Most Recent):  Temp: 97.3 °F (36.3 °C) (05/10/23 1114)  Pulse: 92 (05/10/23 1123)  Resp: 16 (05/10/23 1114)  BP: 122/61 (05/10/23 1114)  SpO2: 98 % (05/10/23 1114) Vital Signs (24h Range):  Temp:  [97.3 °F (36.3 °C)-98.4 °F (36.9 °C)] 97.3 °F (36.3 °C)  Pulse:  [84-92] 92  Resp:  [16-20] 16  SpO2:  [96 %-99 %] 98 %  BP: ()/(47-69) 122/61     Weight: 91.2 kg (201 lb 1 oz)  Body mass index is 33.46 kg/m².     SpO2: 98 %         Intake/Output Summary (Last 24 hours) at 5/10/2023 1247  Last data filed at 5/10/2023 1244  Gross per 24 hour   Intake 702 ml   Output 1026 ml   Net -324 ml         Lines/Drains/Airways       Drain  Duration             Female External Urinary Catheter 05/03/23 0805 7 days                       Physical Exam  Vitals and nursing note reviewed.   Constitutional:       General: She is not in acute distress.     Appearance: She is obese. She is not ill-appearing, toxic-appearing or diaphoretic.   HENT:      Head: Normocephalic and atraumatic.      Mouth/Throat:      Mouth: Mucous membranes are moist.   Eyes:      General: No scleral icterus.        Right eye: No discharge.         Left eye: No discharge.   Neck:      Vascular: JVD present.   Cardiovascular:      Rate and Rhythm: Normal rate and regular rhythm.   Pulmonary:      Effort: Pulmonary effort is normal. No respiratory distress.      Breath sounds: No wheezing.   Abdominal:      General: Bowel sounds are  normal. There is distension.      Palpations: Abdomen is soft.      Tenderness: There is abdominal tenderness. There is no guarding.   Musculoskeletal:      Cervical back: No rigidity.      Right lower leg: Edema present.      Left lower leg: Edema present.      Comments: Patient with bilateral AKA. Minimal edema of thighs   Skin:     General: Skin is warm.      Coloration: Skin is not jaundiced.   Neurological:      Mental Status: She is alert and oriented to person, place, and time. Mental status is at baseline.   Psychiatric:         Mood and Affect: Mood normal.         Behavior: Behavior normal.          Significant Labs: CMP   Recent Labs   Lab 05/09/23  0512 05/10/23  0501    136   K 4.4 4.2    98   CO2 24 23   GLU 91 93   BUN 70* 70*   CREATININE 4.9* 5.0*   CALCIUM 7.1* 7.4*   ANIONGAP 13 15       Significant Imaging:  Reviewed

## 2023-05-10 NOTE — PLAN OF CARE
Problem: Adult Inpatient Plan of Care  Goal: Patient-Specific Goal (Individualized)  Outcome: Ongoing, Progressing  Flowsheets (Taken 5/10/2023 4286)  Anxieties, Fears or Concerns: Keep  updated on POC  Individualized Care Needs: Maintain BG protocol. Maintain Lasix gttt infusing as per MAR. Pt has L arm limb alert. Encourage pt to turn Q 2 hrs. Pt has B/L AKA and should be encouraged to move. As per night shift provider to keep Cordis on L neck.

## 2023-05-10 NOTE — ASSESSMENT & PLAN NOTE
Last Echo 6/21 showing     The estimated ejection fraction is 55%.   The left ventricle is normal in size with normal systolic function.   Normal left ventricular diastolic function.   Normal right ventricular size with normal right ventricular systolic function.   Mild tricuspid regurgitation.   The estimated PA systolic pressure is 31 mmHg.   Normal central venous pressure (3 mmHg).    Echo    Result Date: 5/2/2023  · The left ventricle is mildly enlarged with severely decreased systolic   function. The estimated ejection fraction is 15%.  · There is severe left ventricular global hypokinesis.  · Normal right ventricular size with low normal right ventricular systolic   function.  · Grade I left ventricular diastolic dysfunction.  · Biatrial enlargement.  · Mild-to-moderate mitral regurgitation.  · Severe tricuspid regurgitation.  · The estimated PA systolic pressure is 43 mmHg.  · Elevated central venous pressure (15 mmHg).         RHC  CVP 15  Wedge 25-32      -IV Diuresis with Lasix  -Add metolazone 5mg  -Consult nephrology  -Qq4 BMPs with electrolyte repletion as needed; goal K>4, Mg>2  -Cardiology consult for acute decompensated heart failure with reduced EF   -Transition to lasix 30/hr for daily goal of net negative 2-3L  - Inpatient vs outpatient stress testing once patient euvolemic  - Strict I/Os with daily weights  - 1.5L fluid restriction, low Na diet  - recommend starting GDMT when patient euvolemic and renal function improves  -Tylenol 1g up to 3 times a day

## 2023-05-10 NOTE — PT/OT/SLP PROGRESS
A responsible adult should stay with you and you should rest quietly for the rest of the day.    Do not drink alcohol, drive, operate any heavy machinery or power tools or make any legal/important decisions for the next 24 hours.     Progress your diet as tolerated.  If you begin to experience severe pain, increased shortness of breath, racing heartbeat or a fever above 101 F, seek immediate medical attention.     Follow up with MD as instructed. Call office for results in 3 to 5 days if needed.    [phone]       Impression:  1.  Normal pancreatic parenchymal EUS without any finding of chronic pancreatitis.  2.  Common bile duct is mildly dilated to 7 mm diameter.  Pancreatic was also normal diameter.  3.  Surgical changes of Nissen which has slightly slipped as well as minimal changes of gastritis        Recommendations:    Patient to follow the GI clinic for any persistent abdominal pain as scheduled    Continue with the PPI.  May consider CT scan abdomen and pelvis with IV and oral contrast to evaluate her symptom as outpatient  3. Follow up in clinic with NP in 2-4 weeks to discuss results.       Physical Therapy      Patient Name:  Suyapa oCnnelly   MRN:  [id number]    Patient not seen today secondary to Off the floor for procedure/surgery. Pt CASANDRA for RHC.  Will follow-up when appropriate.

## 2023-05-10 NOTE — ASSESSMENT & PLAN NOTE
"Per chart review from notes in 03/2021, hx of Afib "post op after CABG. Oral amiodarone discontinued and not on NOAC".   CHADSVASC score:5    - Sfter RHC, start Eliquis 5mg BID and continue clopidogrel, stop aspirin  - Continue rate control with Carvedilol  "

## 2023-05-10 NOTE — SUBJECTIVE & OBJECTIVE
Interval History: Pt to go for RHC today. Will add eliquis 5mg bid plus plavix and will discontinue ASA. Pt still having pain in the lower extremities from fluid. No pain from sacral ulcer. Stage 2 currently.     Review of Systems   Constitutional:  Negative for chills, fatigue and fever.   HENT:  Negative for congestion, dental problem, facial swelling, postnasal drip, rhinorrhea, sore throat and trouble swallowing.    Eyes:  Negative for photophobia and pain.   Respiratory:  Negative for cough, choking, chest tightness, shortness of breath and wheezing.    Cardiovascular:  Positive for leg swelling. Negative for chest pain.   Gastrointestinal:  Negative for abdominal distention, abdominal pain, diarrhea, nausea and vomiting.   Endocrine: Negative.    Genitourinary:  Negative for dysuria, flank pain, frequency and hematuria.   Musculoskeletal:  Negative for back pain.   Skin:  Negative for pallor and rash.   Allergic/Immunologic: Negative.    Neurological:  Negative for dizziness, syncope, weakness, numbness and headaches.   Psychiatric/Behavioral:  The patient is not nervous/anxious.    Objective:     Vital Signs (Most Recent):  Temp: 97.3 °F (36.3 °C) (05/10/23 1114)  Pulse: 92 (05/10/23 1123)  Resp: 16 (05/10/23 1114)  BP: 122/61 (05/10/23 1114)  SpO2: 98 % (05/10/23 1114) Vital Signs (24h Range):  Temp:  [97.3 °F (36.3 °C)-98.4 °F (36.9 °C)] 97.3 °F (36.3 °C)  Pulse:  [84-92] 92  Resp:  [16-20] 16  SpO2:  [96 %-99 %] 98 %  BP: ()/(47-69) 122/61     Weight: 91.2 kg (201 lb 1 oz)  Body mass index is 33.46 kg/m².    Intake/Output Summary (Last 24 hours) at 5/10/2023 1308  Last data filed at 5/10/2023 1244  Gross per 24 hour   Intake 702 ml   Output 1026 ml   Net -324 ml         Physical Exam  Vitals and nursing note reviewed.   Constitutional:       General: She is not in acute distress.     Appearance: Normal appearance. She is obese. She is not ill-appearing or diaphoretic.   HENT:      Head: Normocephalic  and atraumatic.      Right Ear: External ear normal.      Left Ear: External ear normal.      Nose: Nose normal.      Mouth/Throat:      Mouth: Mucous membranes are moist.      Pharynx: Oropharynx is clear.   Eyes:      General: No scleral icterus.     Extraocular Movements: Extraocular movements intact.      Conjunctiva/sclera: Conjunctivae normal.      Pupils: Pupils are equal, round, and reactive to light.   Cardiovascular:      Rate and Rhythm: Normal rate and regular rhythm.      Pulses: Normal pulses.      Heart sounds: Normal heart sounds. No murmur heard.  Pulmonary:      Effort: Pulmonary effort is normal. No respiratory distress.      Breath sounds: Normal breath sounds. No stridor. No wheezing or rhonchi.   Chest:      Chest wall: No tenderness.   Abdominal:      General: Abdomen is flat. There is no distension.      Palpations: Abdomen is soft.      Tenderness: There is no abdominal tenderness. There is no guarding or rebound.   Musculoskeletal:         General: Swelling present. No tenderness. Normal range of motion.      Cervical back: Normal range of motion and neck supple. No rigidity or tenderness.      Right lower leg: Edema present.      Left lower leg: Edema present.      Comments: Trigger finger R middle finger   Skin:     General: Skin is warm and dry.      Capillary Refill: Capillary refill takes less than 2 seconds.      Coloration: Skin is not jaundiced.      Findings: No erythema.   Neurological:      General: No focal deficit present.      Mental Status: She is alert and oriented to person, place, and time.      Cranial Nerves: No cranial nerve deficit.      Motor: No weakness.   Psychiatric:         Mood and Affect: Mood normal.         Behavior: Behavior normal.           Significant Labs: All pertinent labs within the past 24 hours have been reviewed.    Significant Imaging: I have reviewed all pertinent imaging results/findings within the past 24 hours.

## 2023-05-11 LAB
ANION GAP SERPL CALC-SCNC: 13 MMOL/L (ref 8–16)
BUN SERPL-MCNC: 69 MG/DL (ref 6–20)
CALCIUM SERPL-MCNC: 7.2 MG/DL (ref 8.7–10.5)
CHLORIDE SERPL-SCNC: 99 MMOL/L (ref 95–110)
CO2 SERPL-SCNC: 25 MMOL/L (ref 23–29)
CREAT SERPL-MCNC: 5 MG/DL (ref 0.5–1.4)
EST. GFR  (NO RACE VARIABLE): 9.6 ML/MIN/1.73 M^2
GLUCOSE SERPL-MCNC: 111 MG/DL (ref 70–110)
POCT GLUCOSE: 110 MG/DL (ref 70–110)
POCT GLUCOSE: 134 MG/DL (ref 70–110)
POCT GLUCOSE: 165 MG/DL (ref 70–110)
POCT GLUCOSE: 88 MG/DL (ref 70–110)
POCT GLUCOSE: 94 MG/DL (ref 70–110)
POTASSIUM SERPL-SCNC: 4.1 MMOL/L (ref 3.5–5.1)
SODIUM SERPL-SCNC: 137 MMOL/L (ref 136–145)

## 2023-05-11 PROCEDURE — 25000003 PHARM REV CODE 250

## 2023-05-11 PROCEDURE — 63600175 PHARM REV CODE 636 W HCPCS: Performed by: STUDENT IN AN ORGANIZED HEALTH CARE EDUCATION/TRAINING PROGRAM

## 2023-05-11 PROCEDURE — 20600001 HC STEP DOWN PRIVATE ROOM

## 2023-05-11 PROCEDURE — 97112 NEUROMUSCULAR REEDUCATION: CPT

## 2023-05-11 PROCEDURE — 63600175 PHARM REV CODE 636 W HCPCS

## 2023-05-11 PROCEDURE — 25000003 PHARM REV CODE 250: Performed by: HOSPITALIST

## 2023-05-11 PROCEDURE — 80048 BASIC METABOLIC PNL TOTAL CA: CPT

## 2023-05-11 PROCEDURE — 99233 SBSQ HOSP IP/OBS HIGH 50: CPT | Mod: GC,,, | Performed by: HOSPITALIST

## 2023-05-11 PROCEDURE — 25000003 PHARM REV CODE 250: Performed by: STUDENT IN AN ORGANIZED HEALTH CARE EDUCATION/TRAINING PROGRAM

## 2023-05-11 PROCEDURE — 97530 THERAPEUTIC ACTIVITIES: CPT

## 2023-05-11 PROCEDURE — 99233 PR SUBSEQUENT HOSPITAL CARE,LEVL III: ICD-10-PCS | Mod: GC,,, | Performed by: HOSPITALIST

## 2023-05-11 PROCEDURE — 99223 PR INITIAL HOSPITAL CARE,LEVL III: ICD-10-PCS | Mod: ,,, | Performed by: INTERNAL MEDICINE

## 2023-05-11 PROCEDURE — 99223 1ST HOSP IP/OBS HIGH 75: CPT | Mod: ,,, | Performed by: INTERNAL MEDICINE

## 2023-05-11 RX ORDER — METOLAZONE 5 MG/1
5 TABLET ORAL DAILY
Status: DISCONTINUED | OUTPATIENT
Start: 2023-05-11 | End: 2023-05-12

## 2023-05-11 RX ORDER — CARVEDILOL 6.25 MG/1
6.25 TABLET ORAL 2 TIMES DAILY WITH MEALS
Status: DISCONTINUED | OUTPATIENT
Start: 2023-05-11 | End: 2023-05-12

## 2023-05-11 RX ADMIN — FUROSEMIDE 30 MG/HR: 10 INJECTION, SOLUTION INTRAMUSCULAR; INTRAVENOUS at 04:05

## 2023-05-11 RX ADMIN — APIXABAN 5 MG: 5 TABLET, FILM COATED ORAL at 09:05

## 2023-05-11 RX ADMIN — ISOSORBIDE DINITRATE 40 MG: 20 TABLET ORAL at 04:05

## 2023-05-11 RX ADMIN — METOLAZONE 5 MG: 5 TABLET ORAL at 08:05

## 2023-05-11 RX ADMIN — INSULIN ASPART 3 UNITS: 100 INJECTION, SOLUTION INTRAVENOUS; SUBCUTANEOUS at 05:05

## 2023-05-11 RX ADMIN — CARVEDILOL 6.25 MG: 6.25 TABLET, FILM COATED ORAL at 04:05

## 2023-05-11 RX ADMIN — FUROSEMIDE 30 MG/HR: 10 INJECTION, SOLUTION INTRAMUSCULAR; INTRAVENOUS at 12:05

## 2023-05-11 RX ADMIN — AMITRIPTYLINE HYDROCHLORIDE 50 MG: 25 TABLET, FILM COATED ORAL at 11:05

## 2023-05-11 RX ADMIN — ALLOPURINOL 100 MG: 100 TABLET ORAL at 08:05

## 2023-05-11 RX ADMIN — SERTRALINE HYDROCHLORIDE 50 MG: 50 TABLET ORAL at 08:05

## 2023-05-11 RX ADMIN — INSULIN ASPART 3 UNITS: 100 INJECTION, SOLUTION INTRAVENOUS; SUBCUTANEOUS at 12:05

## 2023-05-11 RX ADMIN — INSULIN ASPART 3 UNITS: 100 INJECTION, SOLUTION INTRAVENOUS; SUBCUTANEOUS at 08:05

## 2023-05-11 RX ADMIN — CARVEDILOL 3.12 MG: 3.12 TABLET, FILM COATED ORAL at 08:05

## 2023-05-11 RX ADMIN — CLOPIDOGREL BISULFATE 75 MG: 75 TABLET ORAL at 08:05

## 2023-05-11 RX ADMIN — HYDRALAZINE HYDROCHLORIDE 75 MG: 50 TABLET ORAL at 09:05

## 2023-05-11 RX ADMIN — HYDRALAZINE HYDROCHLORIDE 75 MG: 50 TABLET ORAL at 08:05

## 2023-05-11 RX ADMIN — INSULIN DETEMIR 8 UNITS: 100 INJECTION, SOLUTION SUBCUTANEOUS at 09:05

## 2023-05-11 RX ADMIN — HYDRALAZINE HYDROCHLORIDE 75 MG: 50 TABLET ORAL at 04:05

## 2023-05-11 RX ADMIN — PANTOPRAZOLE SODIUM 40 MG: 40 TABLET, DELAYED RELEASE ORAL at 08:05

## 2023-05-11 RX ADMIN — ISOSORBIDE DINITRATE 40 MG: 20 TABLET ORAL at 09:05

## 2023-05-11 RX ADMIN — ISOSORBIDE DINITRATE 40 MG: 20 TABLET ORAL at 08:05

## 2023-05-11 RX ADMIN — ASPIRIN 81 MG: 81 TABLET, COATED ORAL at 08:05

## 2023-05-11 RX ADMIN — SODIUM BICARBONATE 1300 MG: 650 TABLET ORAL at 08:05

## 2023-05-11 RX ADMIN — FUROSEMIDE 30 MG/HR: 10 INJECTION, SOLUTION INTRAMUSCULAR; INTRAVENOUS at 11:05

## 2023-05-11 RX ADMIN — HEPARIN SODIUM 5000 UNITS: 5000 INJECTION INTRAVENOUS; SUBCUTANEOUS at 06:05

## 2023-05-11 RX ADMIN — ATORVASTATIN CALCIUM 80 MG: 40 TABLET, FILM COATED ORAL at 08:05

## 2023-05-11 NOTE — CONSULTS
[facility] - Cardiology Stepdown  Nephrology  Consult Note    Patient Name: Suyapa Connelly  MRN: [id number]  Admission Date: 5/1/2023  Hospital Length of Stay: 8 days  Attending Provider: Anna Lopez MD   Primary Care Physician: Magen Christensen MD  Principal Problem:Acute on chronic combined systolic and diastolic heart failure    Inpatient consult to Nephrology  Consult performed by: Jeremy Juan MD  Consult ordered by: Chandrakant Oakley MD        Subjective:     HPI: 57 y.o. female with past medical history of CAD s/p CABG, CKD, DM, HTN, HLD, s/p bilateral AKA who presnets with chest pain, difficulty breathing, leg swelling. Her most recent ECHO revealed severe systolic dysfunction with EF of 15%. She has been started on diuretics with IV lasix and PO metolazone with adequate urinary response however her renal function has been deteriorating throughout the admission. She reports requiring dialysis at one time before, around the time she underwent her AKAs. Her baseline serum creatinine is in the 2s and it has trended up to 5 during this admission. Nephrology consulted for further evaluation of ROSLYN on CKD.         Past Medical History:   Diagnosis Date    Anxiety     Chronic pain syndrome     CKD (chronic kidney disease), stage III     Depression     Diabetes mellitus     type 2    Diabetes mellitus, type 2     GERD (gastroesophageal reflux disease)     Hyperemesis 3/23/2021    Hyperlipemia     Hypertension     Hypokalemia 3/23/2021    Infection of below knee amputation stump 3/12/2022    Myocardial infarction 2010    minor-caused by stress per pt.    Osteomyelitis     Osteomyelitis of left foot 4/30/2021    PVD (peripheral vascular disease)     Ulcer of left foot     Vaginal delivery     x1       Past Surgical History:   Procedure Laterality Date    ABOVE-KNEE AMPUTATION Left 5/18/2021    Procedure: AMPUTATION, ABOVE KNEE;  Surgeon: Teddy Huber MD;  Location: [facility] OR [address] [place] [address];  Service: Vascular;   Laterality: Left;    ABOVE-KNEE AMPUTATION Right 3/18/2022    Procedure: AMPUTATION, ABOVE KNEE;  Surgeon: DAYNE Florez II, MD;  Location: [facility] OR [address] [place] [address];  Service: Vascular;  Laterality: Right;    Angiogram - Right Extremity Right 7/9/15    angiogram-left leg  10/6/15    ANGIOGRAPHY OF LOWER EXTREMITY Left 4/29/2021    Procedure: ANGIOGRAM, LOWER EXTREMITY;  Surgeon: Teddy Huber MD;  Location: [facility] OR [address] [place] [address];  Service: Vascular;  Laterality: Left;    BELOW KNEE AMPUTATION OF LOWER EXTREMITY Right 12/28/2021    Procedure: AMPUTATION, BELOW KNEE;  Surgeon: Kaitlyn Rojas MD;  Location: [facility] OR;  Service: General;  Laterality: Right;    CATHETERIZATION OF BOTH LEFT AND RIGHT HEART N/A 12/18/2019    Procedure: CATHETERIZATION, HEART, BOTH LEFT AND RIGHT;  Surgeon: Que Fernando III, MD;  Location: [facility] CATH LAB;  Service: Cardiology;  Laterality: N/A;    CORONARY ANGIOGRAPHY N/A 12/18/2019    Procedure: ANGIOGRAM, CORONARY ARTERY;  Surgeon: Que Fernando III, MD;  Location: [facility] CATH LAB;  Service: Cardiology;  Laterality: N/A;    CORONARY ANGIOGRAPHY INCLUDING BYPASS GRAFTS WITH CATHETERIZATION OF LEFT HEART N/A 7/28/2020    Procedure: ANGIOGRAM, CORONARY, INCLUDING BYPASS GRAFT, WITH LEFT HEART CATHETERIZATION, 9 am;  Surgeon: Rachel Easley MD;  Location: [facility] CATH LAB;  Service: Cardiology;  Laterality: N/A;    CORONARY ARTERY BYPASS GRAFT (CABG) N/A 1/14/2020    Procedure: CORONARY ARTERY BYPASS GRAFT (CABG) x 1     Off Pump;  Surgeon: Huang Altamirano MD;  Location: [facility] OR [address] [place] [address];  Service: Cardiovascular;  Laterality: N/A;    CREATION OF FEMORAL-TIBIAL ARTERY BYPASS Left 4/29/2021    Procedure: CREATION, BYPASS, ARTERIAL, FEMORAL TO ANTERIOR TIBIAL;  Surgeon: Teddy Huber MD;  Location: [facility] OR [address] [place] [address];  Service: Vascular;  Laterality: Left;    CREATION OF FEMOROPOPLITEAL ARTERIAL BYPASS USING GRAFT Left 8/18/2020    Procedure: CREATION, BYPASS, ARTERIAL, FEMORAL TO  POPLITEAL, USING GRAFT, LEFT LOWER EXTREMITY;  Surgeon: Teddy Huber MD;  Location: [facility] OR;  Service: Vascular;  Laterality: Left;  REQUEST 7:15 A.M. START----COVID NEGATIVE ON 8/17  1ST CASE STARTE PER LEANA ON 8/7/2020 @ 942AM-LO  RN PREOP 8/12/2020   T/S-----CLEARED BY CARDS-------PENDING INSURANCE    DEBRIDEMENT OF FOOT Left 9/8/2020    Procedure: DEBRIDEMENT, FOOT;  Surgeon: Rosio Mayes DPM;  Location: [facility] OR;  Service: Podiatry;  Laterality: Left;  request neoxx .   RN Pre Op 9-4-2020, Covid negative on 9/5/20. C A    DEBRIDEMENT OF FOOT  3/4/2021    Procedure: DEBRIDEMENT, FOOT;  Surgeon: Teddy Huber MD;  Location: [facility] OR;  Service: Vascular;;    DEBRIDEMENT OF FOOT Left 3/9/2021    Procedure: DEBRIDEMENT, FOOT, bone biopsy;  Surgeon: Rosio Mayes DPM;  Location: [facility] OR;  Service: Podiatry;  Laterality: Left;  Request neoxx---COVID IN AM  REQUESTING NOON START  RN Phone Pre op.On Blood thinners Plavix and Eliquis.  Covid am of surgery. C A    DEBRIDEMENT OF FOOT Left 5/4/2021    Procedure: DEBRIDEMENT, FOOT;  Surgeon: Farooq Morley DPM;  Location: [address];  Service: Podiatry;  Laterality: Left;    INSERTION OF TUNNELED CENTRAL VENOUS HEMODIALYSIS CATHETER N/A 1/27/2020    Procedure: Insertion, Catheter, Central Venous, Hemodialysis;  Surgeon: ESTEBAN Gomez III, MD;  Location: [facility] CATH LAB;  Service: Peripheral Vascular;  Laterality: N/A;    PERCUTANEOUS TRANSLUMINAL ANGIOPLASTY N/A 3/4/2021    Procedure: PTA (ANGIOPLASTY, PERCUTANEOUS, TRANSLUMINAL);  Surgeon: Teddy Huber MD;  Location: [facility] OR;  Service: Vascular;  Laterality: N/A;    REMOVAL OF ARTERIOVENOUS GRAFT Left 5/27/2021    Procedure: REMOVAL, GRAFT, LEFT LOWER EXTREMITY, WOUND EXPLORATION;  Surgeon: Teddy Huber MD;  [address];  Service: Vascular;  Laterality: Left;    REMOVAL OF NAIL OF DIGIT Left 3/9/2021    Procedure: REMOVAL, NAIL, DIGIT;  Surgeon: Rosio Mayes,  DPM;  Location: [facility] OR;  Service: Podiatry;  Laterality: Left;    THROMBECTOMY Left 3/4/2021    Procedure: THROMBECTOMY, LEFT LOWER EXTREMITY BYPASS GRAFT, ANGIOGRAM, POSSIBLE INTERVENTION, POSSIBLE LEFT LOWER EXTREMITY BYPASS;  Surgeon: Teddy Huber MD;  Location: [facility] OR;  Service: Vascular;  Laterality: Left;    THROMBECTOMY Left 4/29/2021    Procedure: GRAFT THROMBECTOMY, LEFT LOWER EXTREMITY;  Surgeon: Teddy Huber MD;  [facility];  Service: Vascular;  Laterality: Left;  14.5 min  1179.85 mGy  341.01 Gycm2  240 ml dye    THROMBECTOMY  10/22/2021    Procedure: THROMBECTOMY;  Surgeon: Saad Arenas MD;  Location: [facility] CATH LAB/EP;  Service: Cardiology;;       Review of patient's allergies indicates:   Allergen Reactions    Ciprofloxacin Itching    Contrast media      Kidney injury    Iodine      Kidney injury     Current Facility-Administered Medications   Medication Frequency    acetaminophen tablet 1,000 mg Q6H PRN    acetaminophen tablet 650 mg Q4H PRN    allopurinol split tablet 100 mg Daily    amitriptyline tablet 50 mg QHS    aspirin EC tablet 81 mg Daily    atorvastatin tablet 80 mg Daily    carvediloL tablet 3.125 mg BID WM    clopidogreL tablet 75 mg Daily    dextrose 10% bolus 125 mL 125 mL PRN    dextrose 10% bolus 250 mL 250 mL PRN    dextrose 40 % gel 15,000 mg PRN    dextrose 40 % gel 30,000 mg PRN    docusate sodium capsule 100 mg BID    furosemide (LASIX) 500 mg in 50 mL infusion (conc: 10 mg/mL) Continuous    glucagon (human recombinant) injection 1 mg PRN    heparin (porcine) injection 5,000 Units Q8H    heparin infusion 1,000 units/500 ml in 0.9% NaCl (on sterile field) PRN    hydrALAZINE tablet 75 mg TID    insulin aspart U-100 pen 0-5 Units QID (AC + HS) PRN    insulin aspart U-100 pen 3 Units TIDWM    insulin detemir U-100 (Levemir) pen 8 Units QHS    isosorbide dinitrate tablet 40 mg TID    LIDOcaine HCL 20 mg/ml (2%) injection PRN    melatonin tablet 6 mg  Nightly PRN    metOLazone tablet 5 mg Daily    midazolam injection PRN    naloxone 0.4 mg/mL injection 0.02 mg PRN    ondansetron disintegrating tablet 8 mg Q8H PRN    pantoprazole EC tablet 40 mg Daily    sertraline tablet 50 mg Daily    sodium bicarbonate tablet 1,300 mg TID    sodium chloride 0.9% flush 10 mL Q12H PRN    sodium chloride 0.9% flush 10 mL PRN     Family History       Problem Relation (Age of Onset)    Diabetes Mother, Father, Paternal Grandmother    Heart disease Maternal Grandmother    No Known Problems Maternal Grandfather, Paternal Grandfather          Tobacco Use    Smoking status: Former    Smokeless tobacco: Never   Substance and Sexual Activity    Alcohol use: No    Drug use: Yes     Types: Marijuana     Comment: occassional    Sexual activity: Yes     Partners: Male     Review of Systems   Constitutional:  Negative for chills, fatigue and fever.   HENT:  Negative for congestion, dental problem, facial swelling, postnasal drip, rhinorrhea, sore throat and trouble swallowing.    Eyes:  Negative for photophobia and pain.   Respiratory:  Negative for cough, choking, chest tightness, shortness of breath and wheezing.    Cardiovascular:  Positive for leg swelling (BL AKA stump swelling). Negative for chest pain.   Gastrointestinal:  Negative for abdominal distention, abdominal pain, diarrhea, nausea and vomiting.   Endocrine: Negative.    Genitourinary:  Negative for dysuria, flank pain, frequency and hematuria.   Musculoskeletal:  Negative for back pain.   Skin:  Negative for pallor and rash.   Allergic/Immunologic: Negative.    Neurological:  Negative for dizziness, syncope, weakness, numbness and headaches.   Psychiatric/Behavioral:  The patient is not nervous/anxious.    Objective:     Vital Signs (Most Recent):  Temp: 98 °F (36.7 °C) (05/11/23 0744)  Pulse: 91 (05/11/23 0744)  Resp: 18 (05/11/23 0744)  BP: (!) 122/59 (05/11/23 0744)  SpO2: 98 % (05/11/23 0744) Vital Signs (24h Range):  Temp:   [97.3 °F (36.3 °C)-98 °F (36.7 °C)] 98 °F (36.7 °C)  Pulse:  [87-96] 91  Resp:  [16-18] 18  SpO2:  [97 %-99 %] 98 %  BP: (122-142)/(51-65) 122/59     Weight: 93.9 kg (207 lb 1.6 oz) (05/11/23 0715)  Body mass index is 34.46 kg/m².  Body surface area is 2.08 meters squared.    I/O last 3 completed shifts:  In: 1764 [P.O.:1764]  Out: 1926 [Urine:1925; Stool:1]     Physical Exam  Vitals and nursing note reviewed.   Constitutional:       General: She is not in acute distress.     Appearance: Normal appearance. She is obese. She is not ill-appearing or diaphoretic.   HENT:      Head: Normocephalic and atraumatic.      Right Ear: External ear normal.      Left Ear: External ear normal.      Nose: Nose normal.      Mouth/Throat:      Mouth: Mucous membranes are moist.      Pharynx: Oropharynx is clear.   Eyes:      General: No scleral icterus.     Extraocular Movements: Extraocular movements intact.      Conjunctiva/sclera: Conjunctivae normal.      Pupils: Pupils are equal, round, and reactive to light.   Cardiovascular:      Rate and Rhythm: Normal rate and regular rhythm.      Pulses: Normal pulses.      Heart sounds: Normal heart sounds. No murmur heard.     Comments: LIJ trialysis line   Pulmonary:      Effort: Pulmonary effort is normal. No respiratory distress.      Breath sounds: Normal breath sounds. No stridor. No wheezing or rhonchi.   Chest:      Chest wall: No tenderness.   Abdominal:      General: Abdomen is flat. There is no distension.      Palpations: Abdomen is soft.      Tenderness: There is no abdominal tenderness. There is no guarding or rebound.   Musculoskeletal:         General: Swelling present. No tenderness. Normal range of motion.      Cervical back: Normal range of motion and neck supple. No rigidity or tenderness.      Right lower leg: Edema present.      Left lower leg: Edema present.      Comments: Trigger finger R middle finger  BL AKA stump edema +2   Skin:     General: Skin is warm and  dry.      Capillary Refill: Capillary refill takes less than 2 seconds.      Coloration: Skin is not jaundiced.      Findings: No erythema.   Neurological:      General: No focal deficit present.      Mental Status: She is alert and oriented to person, place, and time.      Cranial Nerves: No cranial nerve deficit.      Motor: No weakness.   Psychiatric:         Mood and Affect: Mood normal.         Behavior: Behavior normal.        Significant Labs:  CBC:   Recent Labs   Lab 05/10/23  0502   WBC 5.31   RBC 2.88*   HGB 8.6*   HCT 29.0*      *   MCH 29.9   MCHC 29.7*     CMP:   Recent Labs   Lab 05/11/23  0430   *   CALCIUM 7.2*      K 4.1   CO2 25   CL 99   BUN 69*   CREATININE 5.0*         Assessment/Plan:     Cardiac/Vascular  * Acute on chronic combined systolic and diastolic heart failure  Management per primary       S/P CABG x 1  Management per primary       Renal/  ROSLYN (acute kidney injury)  ROSLYN on CKD, likely ischemic, CRS and diuresis  Baseline serum creatinine in the 2s, up to 3.9 on admission   Has been trending up to 5 on her most recent labs  Adequate urine output while on diuretics, currently on lasix drip @ 30mg/h and Metolazone 5mg PO daily added today   Retroperitoneal US with no hydronephrosis   ECHO with sever systolic dysfunction, EF of 15%   RHC with PCWP of 30   Agree with diuresis for additional volume removal; will monitor diuresis after addition of metolazone   Pt at high risk of requiring hemodialysis again in the near future ir persistent deterioration of her renal function   Dose medications to GFR   Avoid nephrotoxic agents as much as possible           Thank you for your consult. I will follow-up with patient. Please contact us if you have any additional questions.    Jeremy Juan MD  Nephrology  [facility]    ATTENDING PHYSICIAN ATTESTATION  I have personally verified the history and examined the patient. I thoroughly reviewed  the demographic, clinical, laboratorial and imaging information available in medical records. I agree with the assessment and recommendations provided by the subspecialty resident who was under my supervision.

## 2023-05-11 NOTE — ASSESSMENT & PLAN NOTE
Last Echo 6/21 showing     The estimated ejection fraction is 55%.   The left ventricle is normal in size with normal systolic function.   Normal left ventricular diastolic function.   Normal right ventricular size with normal right ventricular systolic function.   Mild tricuspid regurgitation.   The estimated PA systolic pressure is 31 mmHg.   Normal central venous pressure (3 mmHg).    Echo    Result Date: 5/2/2023  · The left ventricle is mildly enlarged with severely decreased systolic   function. The estimated ejection fraction is 15%.  · There is severe left ventricular global hypokinesis.  · Normal right ventricular size with low normal right ventricular systolic   function.  · Grade I left ventricular diastolic dysfunction.  · Biatrial enlargement.  · Mild-to-moderate mitral regurgitation.  · Severe tricuspid regurgitation.  · The estimated PA systolic pressure is 43 mmHg.  · Elevated central venous pressure (15 mmHg).         RHC  CVP 15  Wedge 25-32      -IV Diuresis with Lasix  -Add metolazone 5mg  -Consult nephrology  -Qq4 BMPs with electrolyte repletion as needed; goal K>4, Mg>2  -Cardiology consult for acute decompensated heart failure with reduced EF   -Transition to lasix 30/hr for daily goal of net negative 2-3L  - Inpatient vs outpatient stress testing once patient euvolemic  - Strict I/Os with daily weights  - 1.5L fluid restriction, low Na diet  - continue GDMT when possible  -Tylenol 1g up to 3 times a day  -Nephrology consult and appreciate recs  -added metolazone 5mg daily

## 2023-05-11 NOTE — SUBJECTIVE & OBJECTIVE
Interval History: Continuing diuresis today. Consulting nephrology. Pt still having pain due to swelling.     Review of Systems   Constitutional:  Negative for chills, fatigue and fever.   HENT:  Negative for congestion, dental problem, facial swelling, postnasal drip, rhinorrhea, sore throat and trouble swallowing.    Eyes:  Negative for photophobia and pain.   Respiratory:  Negative for cough, choking, chest tightness, shortness of breath and wheezing.    Cardiovascular:  Positive for leg swelling. Negative for chest pain.   Gastrointestinal:  Negative for abdominal distention, abdominal pain, diarrhea, nausea and vomiting.   Endocrine: Negative.    Genitourinary:  Negative for dysuria, flank pain, frequency and hematuria.   Musculoskeletal:  Negative for back pain.   Skin:  Negative for pallor and rash.   Allergic/Immunologic: Negative.    Neurological:  Negative for dizziness, syncope, weakness, numbness and headaches.   Psychiatric/Behavioral:  The patient is not nervous/anxious.    Objective:     Vital Signs (Most Recent):  Temp: 97.8 °F (36.6 °C) (05/11/23 1116)  Pulse: 87 (05/11/23 1116)  Resp: 18 (05/11/23 1116)  BP: (!) 93/44 (05/11/23 1116)  SpO2: (!) 92 % (05/11/23 1116) Vital Signs (24h Range):  Temp:  [97.5 °F (36.4 °C)-98 °F (36.7 °C)] 97.8 °F (36.6 °C)  Pulse:  [87-96] 87  Resp:  [16-18] 18  SpO2:  [92 %-99 %] 92 %  BP: ()/(44-65) 93/44     Weight: 93.9 kg (207 lb 1.6 oz)  Body mass index is 34.46 kg/m².    Intake/Output Summary (Last 24 hours) at 5/11/2023 1208  Last data filed at 5/11/2023 0945  Gross per 24 hour   Intake 1924 ml   Output 1400 ml   Net 524 ml         Physical Exam  Vitals and nursing note reviewed.   Constitutional:       General: She is not in acute distress.     Appearance: Normal appearance. She is obese. She is not ill-appearing or diaphoretic.   HENT:      Head: Normocephalic and atraumatic.      Right Ear: External ear normal.      Left Ear: External ear normal.       Nose: Nose normal.      Mouth/Throat:      Mouth: Mucous membranes are moist.      Pharynx: Oropharynx is clear.   Eyes:      General: No scleral icterus.     Extraocular Movements: Extraocular movements intact.      Conjunctiva/sclera: Conjunctivae normal.      Pupils: Pupils are equal, round, and reactive to light.   Cardiovascular:      Rate and Rhythm: Normal rate and regular rhythm.      Pulses: Normal pulses.      Heart sounds: Normal heart sounds. No murmur heard.  Pulmonary:      Effort: Pulmonary effort is normal. No respiratory distress.      Breath sounds: Normal breath sounds. No stridor. No wheezing or rhonchi.   Chest:      Chest wall: No tenderness.   Abdominal:      General: Abdomen is flat. There is no distension.      Palpations: Abdomen is soft.      Tenderness: There is no abdominal tenderness. There is no guarding or rebound.   Musculoskeletal:         General: Swelling present. No tenderness. Normal range of motion.      Cervical back: Normal range of motion and neck supple. No rigidity or tenderness.      Right lower leg: Edema present.      Left lower leg: Edema present.      Comments: Trigger finger R middle finger   Skin:     General: Skin is warm and dry.      Capillary Refill: Capillary refill takes less than 2 seconds.      Coloration: Skin is not jaundiced.      Findings: No erythema.   Neurological:      General: No focal deficit present.      Mental Status: She is alert and oriented to person, place, and time.      Cranial Nerves: No cranial nerve deficit.      Motor: No weakness.   Psychiatric:         Mood and Affect: Mood normal.         Behavior: Behavior normal.           Significant Labs: All pertinent labs within the past 24 hours have been reviewed.    Significant Imaging: I have reviewed all pertinent imaging results/findings within the past 24 hours.

## 2023-05-11 NOTE — PROGRESS NOTES
[facility]  Progress Note    Patient Name: Suyapa Connelly  MRN: [id number]  Patient Class: IP- Inpatient   Admission Date: 5/1/2023  Length of Stay: 8 days  Attending Physician: Anna Lopez MD  Primary Care Provider: Magen Christensen MD        Subjective:     Principal Problem:Acute on chronic combined systolic and diastolic heart failure        HPI:  57 y.o. female with past medical history of CAD s/p CABG, CKD, DM, HTN, HLD, s/p bilateral AKA who presnets with chest pain, difficulty breathing, leg swelling.     Per ED She has been unable to access any of her medications for the last 3 months. Over the last few months she has noted worsening shortness of breath, particulatly orthopnea, and leg swelling. In the last two weeks she endorses associated abdominal pain and swelling, reduced appetite, and pleuritic chest pain with deep inspiration. She endorses occasional lighthededness with changes in position. Last week she had an episode where she was unable to breathe for about two minutes which was very upsetting for her and her , but she recovered and did not seek care at that time. Today, she complains of significant difficulty breathing, swelling in her legs and abdomen, and reduced appetite.     No headaches, visual changes, URI symptoms, palpitations, nausea, vomiting, diarrhea, blood in her stool, dysuria, hematuria, numbness or weakness in her extremities. She does endorse occasional paresthesias associated with her chronic phantom limb pain. She additionally has had increased difficulties with depression recently, denies suicidal or homicidal ideation, but has had considerable stress and low mood.      Of note, the patient saw a new primary care provider 4/21 for similar concerns who recommended she proceed to the ED at that time, but they were unable due to financial and geographic concerns. She lives two hours away from [facility] and her  cares for her and  elderly mother with dementia. She has had decreased access to medical care over the last year, and unable to access most of her prescriptions. She had a 90 day supply of four medications (lasix, statin, clopidogrel, sertraline) but these were accidentally lost and she has been unable to afford a replacement. They present today for evaluation and to reestablish care and access to her prescriptions.     Labs on admission significant for an elevated BNP, hypocalcemia which corrected is 8.3, significant elevation in Creatinine and a slight elevation of troponin    CXR  Cardiomegaly with pulmonary interstitial edema, suggestive of pulmonary edema secondary to CHF.      Overview/Hospital Course:  Diuresis initiated with Lasix IV.  1.2L UOP in the past 24 hours.  Vitals reassuring with adequate sats on ambient air.  Still very edematious; Cardiology recs lasix gtt.  GDMT initiated. Pt given 2mg morphine for moderate to severe pain in legs from swelling. Diuresis increased to 40mg/hr due to inadequate urine output. Cr continued to increase. Cr stable at new Lasix rate. Diuresed 1.9L with some improvement in clinical status. S/p RHC that showed wedge of 26-32 with CVP of 15. Will continue diuresis with Lasix 30/hr with the addition of metolazone.       Interval History: Continuing diuresis today. Consulting nephrology. Pt still having pain due to swelling.     Review of Systems   Constitutional:  Negative for chills, fatigue and fever.   HENT:  Negative for congestion, dental problem, facial swelling, postnasal drip, rhinorrhea, sore throat and trouble swallowing.    Eyes:  Negative for photophobia and pain.   Respiratory:  Negative for cough, choking, chest tightness, shortness of breath and wheezing.    Cardiovascular:  Positive for leg swelling. Negative for chest pain.   Gastrointestinal:  Negative for abdominal distention, abdominal pain, diarrhea, nausea and vomiting.   Endocrine: Negative.    Genitourinary:  Negative  for dysuria, flank pain, frequency and hematuria.   Musculoskeletal:  Negative for back pain.   Skin:  Negative for pallor and rash.   Allergic/Immunologic: Negative.    Neurological:  Negative for dizziness, syncope, weakness, numbness and headaches.   Psychiatric/Behavioral:  The patient is not nervous/anxious.    Objective:     Vital Signs (Most Recent):  Temp: 97.8 °F (36.6 °C) (05/11/23 1116)  Pulse: 87 (05/11/23 1116)  Resp: 18 (05/11/23 1116)  BP: (!) 93/44 (05/11/23 1116)  SpO2: (!) 92 % (05/11/23 1116) Vital Signs (24h Range):  Temp:  [97.5 °F (36.4 °C)-98 °F (36.7 °C)] 97.8 °F (36.6 °C)  Pulse:  [87-96] 87  Resp:  [16-18] 18  SpO2:  [92 %-99 %] 92 %  BP: ()/(44-65) 93/44     Weight: 93.9 kg (207 lb 1.6 oz)  Body mass index is 34.46 kg/m².    Intake/Output Summary (Last 24 hours) at 5/11/2023 1208  Last data filed at 5/11/2023 0945  Gross per 24 hour   Intake 1924 ml   Output 1400 ml   Net 524 ml         Physical Exam  Vitals and nursing note reviewed.   Constitutional:       General: She is not in acute distress.     Appearance: Normal appearance. She is obese. She is not ill-appearing or diaphoretic.   HENT:      Head: Normocephalic and atraumatic.      Right Ear: External ear normal.      Left Ear: External ear normal.      Nose: Nose normal.      Mouth/Throat:      Mouth: Mucous membranes are moist.      Pharynx: Oropharynx is clear.   Eyes:      General: No scleral icterus.     Extraocular Movements: Extraocular movements intact.      Conjunctiva/sclera: Conjunctivae normal.      Pupils: Pupils are equal, round, and reactive to light.   Cardiovascular:      Rate and Rhythm: Normal rate and regular rhythm.      Pulses: Normal pulses.      Heart sounds: Normal heart sounds. No murmur heard.  Pulmonary:      Effort: Pulmonary effort is normal. No respiratory distress.      Breath sounds: Normal breath sounds. No stridor. No wheezing or rhonchi.   Chest:      Chest wall: No tenderness.   Abdominal:       General: Abdomen is flat. There is no distension.      Palpations: Abdomen is soft.      Tenderness: There is no abdominal tenderness. There is no guarding or rebound.   Musculoskeletal:         General: Swelling present. No tenderness. Normal range of motion.      Cervical back: Normal range of motion and neck supple. No rigidity or tenderness.      Right lower leg: Edema present.      Left lower leg: Edema present.      Comments: Trigger finger R middle finger   Skin:     General: Skin is warm and dry.      Capillary Refill: Capillary refill takes less than 2 seconds.      Coloration: Skin is not jaundiced.      Findings: No erythema.   Neurological:      General: No focal deficit present.      Mental Status: She is alert and oriented to person, place, and time.      Cranial Nerves: No cranial nerve deficit.      Motor: No weakness.   Psychiatric:         Mood and Affect: Mood normal.         Behavior: Behavior normal.           Significant Labs: All pertinent labs within the past 24 hours have been reviewed.    Significant Imaging: I have reviewed all pertinent imaging results/findings within the past 24 hours.      Assessment/Plan:      * Acute on chronic combined systolic and diastolic heart failure  Last Echo 6/21 showing     The estimated ejection fraction is 55%.   The left ventricle is normal in size with normal systolic function.   Normal left ventricular diastolic function.   Normal right ventricular size with normal right ventricular systolic function.   Mild tricuspid regurgitation.   The estimated PA systolic pressure is 31 mmHg.   Normal central venous pressure (3 mmHg).    Echo    Result Date: 5/2/2023  · The left ventricle is mildly enlarged with severely decreased systolic   function. The estimated ejection fraction is 15%.  · There is severe left ventricular global hypokinesis.  · Normal right ventricular size with low normal right ventricular systolic   function.  · Grade I left  ventricular diastolic dysfunction.  · Biatrial enlargement.  · Mild-to-moderate mitral regurgitation.  · Severe tricuspid regurgitation.  · The estimated PA systolic pressure is 43 mmHg.  · Elevated central venous pressure (15 mmHg).         RHC  CVP 15  Wedge 25-32      -IV Diuresis with Lasix  -Add metolazone 5mg  -Consult nephrology  -Qq4 BMPs with electrolyte repletion as needed; goal K>4, Mg>2  -Cardiology consult for acute decompensated heart failure with reduced EF   -Transition to lasix 30/hr for daily goal of net negative 2-3L  - Inpatient vs outpatient stress testing once patient euvolemic  - Strict I/Os with daily weights  - 1.5L fluid restriction, low Na diet  - continue GDMT when possible  -Tylenol 1g up to 3 times a day  -Nephrology consult and appreciate recs  -added metolazone 5mg daily        ROSLYN (acute kidney injury)  Pt with increased Cr to 4.7 from 2.0 baseline. Possibly due to renovascular congestion from volume overload.     -Kerns  -Strict Is/Os  -Avoid nephrotoxic agents  -Lasix 30mg/hr  -Daily CMPs        Acute hypoxemic respiratory failure  Resolved    CKD (chronic kidney disease), stage IV  History of CKD. See ROSLYN.      Above-knee amputation of right lower extremity  Prior history consistent with exam      Uses self-applied continuous glucose monitoring device  POCT glucose and daily CMPs    -Restart insulin for hyperglycemia      Dermatitis associated with moisture  Large body habitus with dermatitis to folds. Will monitor for infection.    -Zinc based powder  -Topical abx if indicated      Status post above knee amputation, left  History of.      Hypoalbuminemia  Pt with poor PO intake reported. Pt eats one meal a day and has associated nausea and vomiting. Will try antiemetics before feeds to improve intake. Will monitor daily intake and calorie replacement.     -Calorie count  -Diabetic/Renal diet  -Antiemetics before meals; Zofran 4 IV PRN  -Monitor Qtc for prolongation with  antiemetics      Impaired functional mobility and endurance  PT/OT for bedbound patient      Paroxysmal atrial fibrillation  Continue home rate control  NSR on EKG        Anemia  Asymptomatic. Higher than 8 months ago. No signs of acute bleed at this time. Haptoglobin elevated.    -CBC daily  -Transfuse if <7        S/P CABG x 1  History of. Sternal scar well healed.       CAD (coronary artery disease)  History of CAD with retrosternal chest pain. EKG negative to ST changes. Nonspecific T wave inversions.     -Cardiac tele  -EKG prn  -Bidil per Cards recs      CAROLIN (generalized anxiety disorder)  Continue home anxiety medications      Essential hypertension  Continue home antihypertensives      PAD (peripheral artery disease)  History of bilateral AKA.         VTE Risk Mitigation (From admission, onward)         Ordered     heparin infusion 1,000 units/500 ml in 0.9% NaCl (on sterile field)  As needed (PRN)         05/10/23 1409     heparin (porcine) injection 5,000 Units  Every 8 hours         05/07/23 1547     IP VTE HIGH RISK PATIENT  Once         05/07/23 1547                Discharge Planning   DEVAN: 5/15/2023     Code Status: Full Code   Is the patient medically ready for discharge?: No    Reason for patient still in hospital (select all that apply): Patient trending condition and Consult recommendations  Discharge Plan A: Home with family, Home Health   Discharge Delays: None known at this time              Onur Bueno MD  Department of Hospital Medicine   [facility]

## 2023-05-11 NOTE — HPI
57 y.o. female with past medical history of CAD s/p CABG, CKD, DM, HTN, HLD, s/p bilateral AKA who presnets with chest pain, difficulty breathing, leg swelling. Her most recent ECHO revealed severe systolic dysfunction with EF of 15%. She has been started on diuretics with IV lasix and PO metolazone with adequate urinary response however her renal function has been deteriorating throughout the admission. She reports requiring dialysis at one time before, around the time she underwent her AKAs. Her baseline serum creatinine is in the 2s and it has trended up to 5 during this admission. Nephrology consulted for further evaluation of ROSLYN on CKD.

## 2023-05-11 NOTE — ASSESSMENT & PLAN NOTE
ROSLYN on CKD, likely ischemic, CRS and diuresis  Baseline serum creatinine in the 2s, up to 3.9 on admission   Has been trending up to 5 on her most recent labs  Adequate urine output while on diuretics, currently on lasix drip @ 30mg/h and Metolazone 5mg PO daily added today   Retroperitoneal US with no hydronephrosis   ECHO with sever systolic dysfunction, EF of 15%   RHC with PCWP of 30   Agree with diuresis for additional volume removal; will monitor diuresis after addition of metolazone   Pt at high risk of requiring hemodialysis again in the near future ir persistent deterioration of her renal function   Dose medications to GFR   Avoid nephrotoxic agents as much as possible

## 2023-05-11 NOTE — ASSESSMENT & PLAN NOTE
"Per chart review from notes in 03/2021, hx of Afib "post op after CABG. Oral amiodarone discontinued and not on NOAC".   CHADSVASC score:5    - After RHC, start Eliquis 5mg BID and continue clopidogrel, stop aspirin  - Continue rate control with Carvedilol  "

## 2023-05-11 NOTE — PLAN OF CARE
Patient laying in bed resting comfortably at this time and appears in no acute distress. She has no complaints and vital signs are stable. Safety measures in place and call bell within reach. Patient and  educated on fluid restriction. Verbalized understanding.     Problem: Adult Inpatient Plan of Care  Goal: Plan of Care Review  Outcome: Ongoing, Progressing  Goal: Patient-Specific Goal (Individualized)  Outcome: Ongoing, Progressing  Goal: Absence of Hospital-Acquired Illness or Injury  Outcome: Ongoing, Progressing  Goal: Optimal Comfort and Wellbeing  Outcome: Ongoing, Progressing  Goal: Readiness for Transition of Care  Outcome: Ongoing, Progressing     Problem: Fluid and Electrolyte Imbalance (Acute Kidney Injury/Impairment)  Goal: Fluid and Electrolyte Balance  Outcome: Ongoing, Progressing     Problem: Oral Intake Inadequate (Acute Kidney Injury/Impairment)  Goal: Optimal Nutrition Intake  Outcome: Ongoing, Progressing     Problem: Renal Function Impairment (Acute Kidney Injury/Impairment)  Goal: Effective Renal Function  Outcome: Ongoing, Progressing     Problem: Skin Injury Risk Increased  Goal: Skin Health and Integrity  Outcome: Ongoing, Progressing     Problem: Impaired Wound Healing  Goal: Optimal Wound Healing  Outcome: Ongoing, Progressing

## 2023-05-11 NOTE — ASSESSMENT & PLAN NOTE
57 y.o F w/ hx of CAD s/p CABG LIMA-LAD 01/2020 & PCI  DESx1 to MidCx), T2DM, diastolic and systolic heart failure(EF15%), PAD s/p RSFA and pop intervention 10/2021, then bilateral AKA, PAF, HTN, HLD here with volume overload in the setting of medication nonadherence.    TTE this admission showed EF of 15% (previously 55%), biatrial enlargement, CVP 15, PASP 43, severe tricuspid regurgitation, not noted on prior in 2021  .2, HgA1c 6.2    Intake/Output Summary (Last 24 hours) at 5/11/2023 1351  Last data filed at 5/11/2023 0945  Gross per 24 hour   Intake 1702 ml   Output 1400 ml   Net 302 ml     Net IO Since Admission: -8,196.91 mL [05/11/23 1351]    Patient has improved significantly in the last few days on a Lasix gtt, she is able to lie flat for >5 mins.     Recommendations:  - Continue Furosemide gtt at 30mg/hr  - Continue Bidil hydralazine 75 mg + isosorbide dinitrate 40mg TID  - Strict I/Os  - Elevated wedge per RHC; recommend aggressive diuresis, may need HD for volume removal   - PET stress testing once patient euvolemic, either inpatient or outpatient  - 1.5L fluid restriction, low Na diet  - recommend Coreg 6.25 BID   - Optimize GDMT when patient euvolemic and renal function improves

## 2023-05-11 NOTE — SUBJECTIVE & OBJECTIVE
Interval History: Patient reports improved dyspnea at rest although persistent HARTMAN. UOP over past 24hrs 1.4L (net +364 2/2 PO intake). Cr 5.0 (stable).     Review of Systems   Cardiovascular:  Positive for dyspnea on exertion, leg swelling and orthopnea. Negative for chest pain.   Respiratory:  Positive for shortness of breath. Negative for cough and sputum production.    Gastrointestinal:  Negative for nausea and vomiting.   Genitourinary:  Negative for dysuria and frequency.   Objective:     Vital Signs (Most Recent):  Temp: 97.8 °F (36.6 °C) (05/11/23 1116)  Pulse: 87 (05/11/23 1116)  Resp: 18 (05/11/23 1116)  BP: (!) 93/44 (05/11/23 1116)  SpO2: (!) 92 % (05/11/23 1116) Vital Signs (24h Range):  Temp:  [97.5 °F (36.4 °C)-98 °F (36.7 °C)] 97.8 °F (36.6 °C)  Pulse:  [87-96] 87  Resp:  [16-18] 18  SpO2:  [92 %-99 %] 92 %  BP: ()/(44-65) 93/44     Weight: 93.9 kg (207 lb 1.6 oz)  Body mass index is 34.46 kg/m².     SpO2: (!) 92 %         Intake/Output Summary (Last 24 hours) at 5/11/2023 1346  Last data filed at 5/11/2023 0945  Gross per 24 hour   Intake 1702 ml   Output 1400 ml   Net 302 ml         Lines/Drains/Airways       Drain  Duration             Female External Urinary Catheter 05/03/23 0805 8 days              Peripheral Intravenous Line  Duration                  Peripheral IV - Single Lumen 05/10/23 1724 20 G;1 3/4 in Right Forearm <1 day                       Physical Exam  Vitals and nursing note reviewed.   Constitutional:       General: She is not in acute distress.     Appearance: She is obese. She is not ill-appearing, toxic-appearing or diaphoretic.   HENT:      Head: Normocephalic and atraumatic.      Mouth/Throat:      Mouth: Mucous membranes are moist.   Eyes:      General: No scleral icterus.        Right eye: No discharge.         Left eye: No discharge.   Neck:      Vascular: JVD present.   Cardiovascular:      Rate and Rhythm: Normal rate and regular rhythm.   Pulmonary:      Effort:  Pulmonary effort is normal. No respiratory distress.      Breath sounds: No wheezing.   Abdominal:      General: Bowel sounds are normal. There is distension.      Palpations: Abdomen is soft.      Tenderness: There is abdominal tenderness. There is no guarding.   Musculoskeletal:      Cervical back: No rigidity.      Right lower leg: Edema present.      Left lower leg: Edema present.      Comments: Patient with bilateral AKA. Minimal edema of thighs   Skin:     General: Skin is warm.      Coloration: Skin is not jaundiced.   Neurological:      Mental Status: She is alert and oriented to person, place, and time. Mental status is at baseline.   Psychiatric:         Mood and Affect: Mood normal.         Behavior: Behavior normal.          Significant Labs: CMP   Recent Labs   Lab 05/10/23  0501 05/11/23  0430    137   K 4.2 4.1   CL 98 99   CO2 23 25   GLU 93 111*   BUN 70* 69*   CREATININE 5.0* 5.0*   CALCIUM 7.4* 7.2*   ANIONGAP 15 13       Significant Imaging:  Reviewed

## 2023-05-11 NOTE — NURSING
"This patient has refused to turn opr be turned the last few rounds I have made to her room.She hav verbalized why she is supposed to turn. She also states, "It hurts too much." Charge Nurse, Theodora made aware.  "

## 2023-05-11 NOTE — SUBJECTIVE & OBJECTIVE
Past Medical History:   Diagnosis Date    Anxiety     Chronic pain syndrome     CKD (chronic kidney disease), stage III     Depression     Diabetes mellitus     type 2    Diabetes mellitus, type 2     GERD (gastroesophageal reflux disease)     Hyperemesis 3/23/2021    Hyperlipemia     Hypertension     Hypokalemia 3/23/2021    Infection of below knee amputation stump 3/12/2022    Myocardial infarction 2010    minor-caused by stress per pt.    Osteomyelitis     Osteomyelitis of left foot 4/30/2021    PVD (peripheral vascular disease)     Ulcer of left foot     Vaginal delivery     x1       Past Surgical History:   Procedure Laterality Date    ABOVE-KNEE AMPUTATION Left 5/18/2021    Procedure: AMPUTATION, ABOVE KNEE;  Surgeon: Teddy Huber MD;  Location: [facility] OR [address] [place] [address];  Service: Vascular;  Laterality: Left;    ABOVE-KNEE AMPUTATION Right 3/18/2022    Procedure: AMPUTATION, ABOVE KNEE;  Surgeon: DAYNE Florez II, MD;  Location: [facility] OR [address] [place] [address];  Service: Vascular;  Laterality: Right;    Angiogram - Right Extremity Right 7/9/15    angiogram-left leg  10/6/15    ANGIOGRAPHY OF LOWER EXTREMITY Left 4/29/2021    Procedure: ANGIOGRAM, LOWER EXTREMITY;  Surgeon: Teddy Huber MD;  Location: [facility] OR [address] [place] [address];  Service: Vascular;  Laterality: Left;    BELOW KNEE AMPUTATION OF LOWER EXTREMITY Right 12/28/2021    Procedure: AMPUTATION, BELOW KNEE;  Surgeon: Kaitlyn Rojas MD;  Location: [facility];  Service: General;  Laterality: Right;    CATHETERIZATION OF BOTH LEFT AND RIGHT HEART N/A 12/18/2019    Procedure: CATHETERIZATION, HEART, BOTH LEFT AND RIGHT;  Surgeon: Que Fernando III, MD;  Location: [facility] CATH LAB;  Service: Cardiology;  Laterality: N/A;    CORONARY ANGIOGRAPHY N/A 12/18/2019    Procedure: ANGIOGRAM, CORONARY ARTERY;  Surgeon: Que Fernando III, MD;  Location: [facility] CATH LAB;  Service: Cardiology;  Laterality: N/A;    CORONARY ANGIOGRAPHY INCLUDING BYPASS GRAFTS WITH CATHETERIZATION  OF LEFT HEART N/A 7/28/2020    Procedure: ANGIOGRAM, CORONARY, INCLUDING BYPASS GRAFT, WITH LEFT HEART CATHETERIZATION, 9 am;  Surgeon: Rachel Easley MD;  Location: [facility] CATH LAB;  Service: Cardiology;  Laterality: N/A;    CORONARY ARTERY BYPASS GRAFT (CABG) N/A 1/14/2020    Procedure: CORONARY ARTERY BYPASS GRAFT (CABG) x 1     Off Pump;  Surgeon: Huang Altamirano MD;  Location: [facility] OR [address] [place] [address];  Service: Cardiovascular;  Laterality: N/A;    CREATION OF FEMORAL-TIBIAL ARTERY BYPASS Left 4/29/2021    Procedure: CREATION, BYPASS, ARTERIAL, FEMORAL TO ANTERIOR TIBIAL;  Surgeon: Teddy Huber MD;  [facility];  Service: Vascular;  Laterality: Left;    CREATION OF FEMOROPOPLITEAL ARTERIAL BYPASS USING GRAFT Left 8/18/2020    Procedure: CREATION, BYPASS, ARTERIAL, FEMORAL TO POPLITEAL, USING GRAFT, LEFT LOWER EXTREMITY;  Surgeon: Teddy Huber MD;  Location: [facility] OR;  Service: Vascular;  Laterality: Left;  REQUEST 7:15 A.M. START----COVID NEGATIVE ON 8/17  1ST CASE STARTE PER LEANA ON 8/7/2020 @ 942AM-  RN PREOP 8/12/2020   T/S-----CLEARED BY CARDS-------PENDING INSURANCE    DEBRIDEMENT OF FOOT Left 9/8/2020    Procedure: DEBRIDEMENT, FOOT;  Surgeon: Rosio Mayes DPM;  Location: [facility] OR;  Service: Podiatry;  Laterality: Left;  request neoxx .   RN Pre Op 9-4-2020, Covid negative on 9/5/20. C A    DEBRIDEMENT OF FOOT  3/4/2021    Procedure: DEBRIDEMENT, FOOT;  Surgeon: Teddy Huber MD;  Location: [facility] OR;  Service: Vascular;;    DEBRIDEMENT OF FOOT Left 3/9/2021    Procedure: DEBRIDEMENT, FOOT, bone biopsy;  Surgeon: Rosio Mayes DPM;  Location: [facility] OR;  Service: Podiatry;  Laterality: Left;  Request neoxx---COVID IN AM  REQUESTING NOON START  RN Phone Pre op.On Blood thinners Plavix and Eliquis.  Covid am of surgery. C A    DEBRIDEMENT OF FOOT Left 5/4/2021    Procedure: DEBRIDEMENT, FOOT;  Surgeon: Farooq Morley DPM;  Location: [facility] OR [address] [place] [address];  Service:  Podiatry;  Laterality: Left;    INSERTION OF TUNNELED CENTRAL VENOUS HEMODIALYSIS CATHETER N/A 1/27/2020    Procedure: Insertion, Catheter, Central Venous, Hemodialysis;  Surgeon: ESTEBAN Gomez III, MD;  Location: [facility] CATH LAB;  Service: Peripheral Vascular;  Laterality: N/A;    PERCUTANEOUS TRANSLUMINAL ANGIOPLASTY N/A 3/4/2021    Procedure: PTA (ANGIOPLASTY, PERCUTANEOUS, TRANSLUMINAL);  Surgeon: Teddy Huber MD;  Location: [facility] OR;  Service: Vascular;  Laterality: N/A;    REMOVAL OF ARTERIOVENOUS GRAFT Left 5/27/2021    Procedure: REMOVAL, GRAFT, LEFT LOWER EXTREMITY, WOUND EXPLORATION;  Surgeon: Teddy Huber MD;  Location: [facility] OR 2ND FLR;  Service: Vascular;  Laterality: Left;    REMOVAL OF NAIL OF DIGIT Left 3/9/2021    Procedure: REMOVAL, NAIL, DIGIT;  Surgeon: Rosio Mayes DPM;  Location: [facility] OR;  Service: Podiatry;  Laterality: Left;    THROMBECTOMY Left 3/4/2021    Procedure: THROMBECTOMY, LEFT LOWER EXTREMITY BYPASS GRAFT, ANGIOGRAM, POSSIBLE INTERVENTION, POSSIBLE LEFT LOWER EXTREMITY BYPASS;  Surgeon: Teddy Huber MD;  Location: [facility] OR;  Service: Vascular;  Laterality: Left;    THROMBECTOMY Left 4/29/2021    Procedure: GRAFT THROMBECTOMY, LEFT LOWER EXTREMITY;  Surgeon: Teddy Huber MD;  Location: [facility] OR 2ND FLR;  Service: Vascular;  Laterality: Left;  14.5 min  1179.85 mGy  341.01 Gycm2  240 ml dye    THROMBECTOMY  10/22/2021    Procedure: THROMBECTOMY;  Surgeon: Saad Arenas MD;  Location: [facility] CATH LAB/EP;  Service: Cardiology;;       Review of patient's allergies indicates:   Allergen Reactions    Ciprofloxacin Itching    Contrast media      Kidney injury    Iodine      Kidney injury     Current Facility-Administered Medications   Medication Frequency    acetaminophen tablet 1,000 mg Q6H PRN    acetaminophen tablet 650 mg Q4H PRN    allopurinol split tablet 100 mg Daily    amitriptyline tablet 50 mg QHS    aspirin EC tablet 81 mg Daily    atorvastatin  tablet 80 mg Daily    carvediloL tablet 3.125 mg BID WM    clopidogreL tablet 75 mg Daily    dextrose 10% bolus 125 mL 125 mL PRN    dextrose 10% bolus 250 mL 250 mL PRN    dextrose 40 % gel 15,000 mg PRN    dextrose 40 % gel 30,000 mg PRN    docusate sodium capsule 100 mg BID    furosemide (LASIX) 500 mg in 50 mL infusion (conc: 10 mg/mL) Continuous    glucagon (human recombinant) injection 1 mg PRN    heparin (porcine) injection 5,000 Units Q8H    heparin infusion 1,000 units/500 ml in 0.9% NaCl (on sterile field) PRN    hydrALAZINE tablet 75 mg TID    insulin aspart U-100 pen 0-5 Units QID (AC + HS) PRN    insulin aspart U-100 pen 3 Units TIDWM    insulin detemir U-100 (Levemir) pen 8 Units QHS    isosorbide dinitrate tablet 40 mg TID    LIDOcaine HCL 20 mg/ml (2%) injection PRN    melatonin tablet 6 mg Nightly PRN    metOLazone tablet 5 mg Daily    midazolam injection PRN    naloxone 0.4 mg/mL injection 0.02 mg PRN    ondansetron disintegrating tablet 8 mg Q8H PRN    pantoprazole EC tablet 40 mg Daily    sertraline tablet 50 mg Daily    sodium bicarbonate tablet 1,300 mg TID    sodium chloride 0.9% flush 10 mL Q12H PRN    sodium chloride 0.9% flush 10 mL PRN     Family History       Problem Relation (Age of Onset)    Diabetes Mother, Father, Paternal Grandmother    Heart disease Maternal Grandmother    No Known Problems Maternal Grandfather, Paternal Grandfather          Tobacco Use    Smoking status: Former    Smokeless tobacco: Never   Substance and Sexual Activity    Alcohol use: No    Drug use: Yes     Types: Marijuana     Comment: occassional    Sexual activity: Yes     Partners: Male     Review of Systems   Constitutional:  Negative for chills, fatigue and fever.   HENT:  Negative for congestion, dental problem, facial swelling, postnasal drip, rhinorrhea, sore throat and trouble swallowing.    Eyes:  Negative for photophobia and pain.   Respiratory:  Negative for cough, choking, chest tightness, shortness  of breath and wheezing.    Cardiovascular:  Positive for leg swelling (BL AKA stump swelling). Negative for chest pain.   Gastrointestinal:  Negative for abdominal distention, abdominal pain, diarrhea, nausea and vomiting.   Endocrine: Negative.    Genitourinary:  Negative for dysuria, flank pain, frequency and hematuria.   Musculoskeletal:  Negative for back pain.   Skin:  Negative for pallor and rash.   Allergic/Immunologic: Negative.    Neurological:  Negative for dizziness, syncope, weakness, numbness and headaches.   Psychiatric/Behavioral:  The patient is not nervous/anxious.    Objective:     Vital Signs (Most Recent):  Temp: 98 °F (36.7 °C) (05/11/23 0744)  Pulse: 91 (05/11/23 0744)  Resp: 18 (05/11/23 0744)  BP: (!) 122/59 (05/11/23 0744)  SpO2: 98 % (05/11/23 0744) Vital Signs (24h Range):  Temp:  [97.3 °F (36.3 °C)-98 °F (36.7 °C)] 98 °F (36.7 °C)  Pulse:  [87-96] 91  Resp:  [16-18] 18  SpO2:  [97 %-99 %] 98 %  BP: (122-142)/(51-65) 122/59     Weight: 93.9 kg (207 lb 1.6 oz) (05/11/23 0715)  Body mass index is 34.46 kg/m².  Body surface area is 2.08 meters squared.    I/O last 3 completed shifts:  In: 1764 [P.O.:1764]  Out: 1926 [Urine:1925; Stool:1]     Physical Exam  Vitals and nursing note reviewed.   Constitutional:       General: She is not in acute distress.     Appearance: Normal appearance. She is obese. She is not ill-appearing or diaphoretic.   HENT:      Head: Normocephalic and atraumatic.      Right Ear: External ear normal.      Left Ear: External ear normal.      Nose: Nose normal.      Mouth/Throat:      Mouth: Mucous membranes are moist.      Pharynx: Oropharynx is clear.   Eyes:      General: No scleral icterus.     Extraocular Movements: Extraocular movements intact.      Conjunctiva/sclera: Conjunctivae normal.      Pupils: Pupils are equal, round, and reactive to light.   Cardiovascular:      Rate and Rhythm: Normal rate and regular rhythm.      Pulses: Normal pulses.      Heart  sounds: Normal heart sounds. No murmur heard.     Comments: LIJ trialysis line   Pulmonary:      Effort: Pulmonary effort is normal. No respiratory distress.      Breath sounds: Normal breath sounds. No stridor. No wheezing or rhonchi.   Chest:      Chest wall: No tenderness.   Abdominal:      General: Abdomen is flat. There is no distension.      Palpations: Abdomen is soft.      Tenderness: There is no abdominal tenderness. There is no guarding or rebound.   Musculoskeletal:         General: Swelling present. No tenderness. Normal range of motion.      Cervical back: Normal range of motion and neck supple. No rigidity or tenderness.      Right lower leg: Edema present.      Left lower leg: Edema present.      Comments: Trigger finger R middle finger  BL AKA stump edema +2   Skin:     General: Skin is warm and dry.      Capillary Refill: Capillary refill takes less than 2 seconds.      Coloration: Skin is not jaundiced.      Findings: No erythema.   Neurological:      General: No focal deficit present.      Mental Status: She is alert and oriented to person, place, and time.      Cranial Nerves: No cranial nerve deficit.      Motor: No weakness.   Psychiatric:         Mood and Affect: Mood normal.         Behavior: Behavior normal.        Significant Labs:  CBC:   Recent Labs   Lab 05/10/23  0502   WBC 5.31   RBC 2.88*   HGB 8.6*   HCT 29.0*      *   MCH 29.9   MCHC 29.7*     CMP:   Recent Labs   Lab 05/11/23  0430   *   CALCIUM 7.2*      K 4.1   CO2 25   CL 99   BUN 69*   CREATININE 5.0*

## 2023-05-11 NOTE — PROGRESS NOTES
[facility]  Cardiology  Progress Note    Patient Name: Suyapa Connelly  MRN: [id number]  Admission Date: 5/1/2023  Hospital Length of Stay: 8 days  Code Status: Full Code   Attending Physician: Anna Lopez MD   Primary Care Physician: Magen Christensen MD  Expected Discharge Date: 5/15/2023  Principal Problem:Acute on chronic combined systolic and diastolic heart failure    Subjective:     Interval History: Patient reports improved dyspnea at rest although persistent HARTMAN. UOP over past 24hrs 1.4L (net +364 2/2 PO intake). Cr 5.0 (stable).     Review of Systems   Cardiovascular:  Positive for dyspnea on exertion, leg swelling and orthopnea. Negative for chest pain.   Respiratory:  Positive for shortness of breath. Negative for cough and sputum production.    Gastrointestinal:  Negative for nausea and vomiting.   Genitourinary:  Negative for dysuria and frequency.   Objective:     Vital Signs (Most Recent):  Temp: 97.8 °F (36.6 °C) (05/11/23 1116)  Pulse: 87 (05/11/23 1116)  Resp: 18 (05/11/23 1116)  BP: (!) 93/44 (05/11/23 1116)  SpO2: (!) 92 % (05/11/23 1116) Vital Signs (24h Range):  Temp:  [97.5 °F (36.4 °C)-98 °F (36.7 °C)] 97.8 °F (36.6 °C)  Pulse:  [87-96] 87  Resp:  [16-18] 18  SpO2:  [92 %-99 %] 92 %  BP: ()/(44-65) 93/44     Weight: 93.9 kg (207 lb 1.6 oz)  Body mass index is 34.46 kg/m².     SpO2: (!) 92 %         Intake/Output Summary (Last 24 hours) at 5/11/2023 1346  Last data filed at 5/11/2023 0945  Gross per 24 hour   Intake 1702 ml   Output 1400 ml   Net 302 ml         Lines/Drains/Airways       Drain  Duration             Female External Urinary Catheter 05/03/23 0805 8 days              Peripheral Intravenous Line  Duration                  Peripheral IV - Single Lumen 05/10/23 1724 20 G;1 3/4 in Right Forearm <1 day                       Physical Exam  Vitals and nursing note reviewed.   Constitutional:       General: She is not in acute distress.     Appearance: She is  obese. She is not ill-appearing, toxic-appearing or diaphoretic.   HENT:      Head: Normocephalic and atraumatic.      Mouth/Throat:      Mouth: Mucous membranes are moist.   Eyes:      General: No scleral icterus.        Right eye: No discharge.         Left eye: No discharge.   Neck:      Vascular: JVD present.   Cardiovascular:      Rate and Rhythm: Normal rate and regular rhythm.   Pulmonary:      Effort: Pulmonary effort is normal. No respiratory distress.      Breath sounds: No wheezing.   Abdominal:      General: Bowel sounds are normal. There is distension.      Palpations: Abdomen is soft.      Tenderness: There is abdominal tenderness. There is no guarding.   Musculoskeletal:      Cervical back: No rigidity.      Right lower leg: Edema present.      Left lower leg: Edema present.      Comments: Patient with bilateral AKA. Minimal edema of thighs   Skin:     General: Skin is warm.      Coloration: Skin is not jaundiced.   Neurological:      Mental Status: She is alert and oriented to person, place, and time. Mental status is at baseline.   Psychiatric:         Mood and Affect: Mood normal.         Behavior: Behavior normal.          Significant Labs: CMP   Recent Labs   Lab 05/10/23  0501 05/11/23  0430    137   K 4.2 4.1   CL 98 99   CO2 23 25   GLU 93 111*   BUN 70* 69*   CREATININE 5.0* 5.0*   CALCIUM 7.4* 7.2*   ANIONGAP 15 13       Significant Imaging:  Reviewed    Assessment and Plan:       * Acute on chronic combined systolic and diastolic heart failure  57 y.o F w/ hx of CAD s/p CABG LIMA-LAD 01/2020 & PCI  DESx1 to MidCx), T2DM, diastolic and systolic heart failure(EF15%), PAD s/p RSFA and pop intervention 10/2021, then bilateral AKA, PAF, HTN, HLD here with volume overload in the setting of medication nonadherence.    TTE this admission showed EF of 15% (previously 55%), biatrial enlargement, CVP 15, PASP 43, severe tricuspid regurgitation, not noted on prior in 2021  .2, HgA1c  "6.2    Intake/Output Summary (Last 24 hours) at 5/11/2023 1351  Last data filed at 5/11/2023 0945  Gross per 24 hour   Intake 1702 ml   Output 1400 ml   Net 302 ml     Net IO Since Admission: -8,196.91 mL [05/11/23 1351]    Patient has improved significantly in the last few days on a Lasix gtt, she is able to lie flat for >5 mins.     Recommendations:  - Continue Furosemide gtt at 30mg/hr  - Continue Bidil hydralazine 75 mg + isosorbide dinitrate 40mg TID  - Strict I/Os  - Elevated wedge per RHC; recommend aggressive diuresis, may need HD for volume removal   - PET stress testing once patient euvolemic, either inpatient or outpatient  - 1.5L fluid restriction, low Na diet  - recommend Coreg 6.25 BID   - Optimize GDMT when patient euvolemic and renal function improves    ROSLYN (acute kidney injury)  - Likely cardiorenal, previously she has required HD  - Hoping to avoid need for HD with ongoing diuresis  - Consult nephrology     Paroxysmal atrial fibrillation  Per chart review from notes in 03/2021, hx of Afib "post op after CABG. Oral amiodarone discontinued and not on NOAC".   CHADSVASC score:5    - After RHC, start Eliquis 5mg BID and continue clopidogrel, stop aspirin  - Continue rate control with Carvedilol        VTE Risk Mitigation (From admission, onward)         Ordered     apixaban tablet 2.5 mg  2 times daily         05/11/23 1329     heparin infusion 1,000 units/500 ml in 0.9% NaCl (on sterile field)  As needed (PRN)         05/10/23 1409     IP VTE HIGH RISK PATIENT  Once         05/07/23 1547                Olga Yost MD  Cardiology  [facility]  "

## 2023-05-12 PROBLEM — N18.4 CKD (CHRONIC KIDNEY DISEASE), STAGE IV: Chronic | Status: ACTIVE | Noted: 2023-04-21

## 2023-05-12 PROBLEM — M79.10 MYALGIA: Status: ACTIVE | Noted: 2023-05-12

## 2023-05-12 LAB
ALBUMIN SERPL BCP-MCNC: 2.2 G/DL (ref 3.5–5.2)
ALP SERPL-CCNC: 186 U/L (ref 55–135)
ALT SERPL W/O P-5'-P-CCNC: 16 U/L (ref 10–44)
ANION GAP SERPL CALC-SCNC: 12 MMOL/L (ref 8–16)
ANION GAP SERPL CALC-SCNC: 13 MMOL/L (ref 8–16)
ANION GAP SERPL CALC-SCNC: 17 MMOL/L (ref 8–16)
AST SERPL-CCNC: 32 U/L (ref 10–40)
BASOPHILS # BLD AUTO: 0.04 K/UL (ref 0–0.2)
BASOPHILS # BLD AUTO: 0.04 K/UL (ref 0–0.2)
BASOPHILS NFR BLD: 0.7 % (ref 0–1.9)
BASOPHILS NFR BLD: 0.8 % (ref 0–1.9)
BILIRUB SERPL-MCNC: 0.4 MG/DL (ref 0.1–1)
BNP SERPL-MCNC: 234 PG/ML (ref 0–99)
BUN SERPL-MCNC: 62 MG/DL (ref 6–20)
BUN SERPL-MCNC: 65 MG/DL (ref 6–20)
BUN SERPL-MCNC: 68 MG/DL (ref 6–20)
CALCIUM SERPL-MCNC: 7 MG/DL (ref 8.7–10.5)
CALCIUM SERPL-MCNC: 7.3 MG/DL (ref 8.7–10.5)
CALCIUM SERPL-MCNC: 7.4 MG/DL (ref 8.7–10.5)
CHLORIDE SERPL-SCNC: 93 MMOL/L (ref 95–110)
CHLORIDE SERPL-SCNC: 96 MMOL/L (ref 95–110)
CHLORIDE SERPL-SCNC: 99 MMOL/L (ref 95–110)
CK SERPL-CCNC: 196 U/L (ref 20–180)
CO2 SERPL-SCNC: 22 MMOL/L (ref 23–29)
CO2 SERPL-SCNC: 25 MMOL/L (ref 23–29)
CO2 SERPL-SCNC: 25 MMOL/L (ref 23–29)
CREAT SERPL-MCNC: 5.1 MG/DL (ref 0.5–1.4)
CREAT SERPL-MCNC: 5.5 MG/DL (ref 0.5–1.4)
CREAT SERPL-MCNC: 5.7 MG/DL (ref 0.5–1.4)
DIFFERENTIAL METHOD: ABNORMAL
DIFFERENTIAL METHOD: ABNORMAL
EOSINOPHIL # BLD AUTO: 0.1 K/UL (ref 0–0.5)
EOSINOPHIL # BLD AUTO: 0.2 K/UL (ref 0–0.5)
EOSINOPHIL NFR BLD: 1.5 % (ref 0–8)
EOSINOPHIL NFR BLD: 4.3 % (ref 0–8)
ERYTHROCYTE [DISTWIDTH] IN BLOOD BY AUTOMATED COUNT: 14.3 % (ref 11.5–14.5)
ERYTHROCYTE [DISTWIDTH] IN BLOOD BY AUTOMATED COUNT: 14.4 % (ref 11.5–14.5)
EST. GFR  (NO RACE VARIABLE): 8.2 ML/MIN/1.73 M^2
EST. GFR  (NO RACE VARIABLE): 8.5 ML/MIN/1.73 M^2
EST. GFR  (NO RACE VARIABLE): 9.4 ML/MIN/1.73 M^2
GLUCOSE SERPL-MCNC: 153 MG/DL (ref 70–110)
GLUCOSE SERPL-MCNC: 190 MG/DL (ref 70–110)
GLUCOSE SERPL-MCNC: 57 MG/DL (ref 70–110)
HCT VFR BLD AUTO: 27.7 % (ref 37–48.5)
HCT VFR BLD AUTO: 29 % (ref 37–48.5)
HGB BLD-MCNC: 8.6 G/DL (ref 12–16)
HGB BLD-MCNC: 8.7 G/DL (ref 12–16)
IMM GRANULOCYTES # BLD AUTO: 0.01 K/UL (ref 0–0.04)
IMM GRANULOCYTES # BLD AUTO: 0.01 K/UL (ref 0–0.04)
IMM GRANULOCYTES NFR BLD AUTO: 0.2 % (ref 0–0.5)
IMM GRANULOCYTES NFR BLD AUTO: 0.2 % (ref 0–0.5)
LACTATE SERPL-SCNC: 0.7 MMOL/L (ref 0.5–2.2)
LACTATE SERPL-SCNC: 0.7 MMOL/L (ref 0.5–2.2)
LYMPHOCYTES # BLD AUTO: 0.8 K/UL (ref 1–4.8)
LYMPHOCYTES # BLD AUTO: 1.1 K/UL (ref 1–4.8)
LYMPHOCYTES NFR BLD: 14.4 % (ref 18–48)
LYMPHOCYTES NFR BLD: 20.7 % (ref 18–48)
MAGNESIUM SERPL-MCNC: 1.8 MG/DL (ref 1.6–2.6)
MCH RBC QN AUTO: 30.2 PG (ref 27–31)
MCH RBC QN AUTO: 30.9 PG (ref 27–31)
MCHC RBC AUTO-ENTMCNC: 29.7 G/DL (ref 32–36)
MCHC RBC AUTO-ENTMCNC: 31.4 G/DL (ref 32–36)
MCV RBC AUTO: 102 FL (ref 82–98)
MCV RBC AUTO: 98 FL (ref 82–98)
MONOCYTES # BLD AUTO: 0.6 K/UL (ref 0.3–1)
MONOCYTES # BLD AUTO: 0.7 K/UL (ref 0.3–1)
MONOCYTES NFR BLD: 11.7 % (ref 4–15)
MONOCYTES NFR BLD: 11.9 % (ref 4–15)
NEUTROPHILS # BLD AUTO: 3.3 K/UL (ref 1.8–7.7)
NEUTROPHILS # BLD AUTO: 4.2 K/UL (ref 1.8–7.7)
NEUTROPHILS NFR BLD: 62.1 % (ref 38–73)
NEUTROPHILS NFR BLD: 71.5 % (ref 38–73)
NRBC BLD-RTO: 0 /100 WBC
NRBC BLD-RTO: 0 /100 WBC
PLATELET # BLD AUTO: 320 K/UL (ref 150–450)
PLATELET # BLD AUTO: 335 K/UL (ref 150–450)
PMV BLD AUTO: 10.2 FL (ref 9.2–12.9)
PMV BLD AUTO: 10.4 FL (ref 9.2–12.9)
POCT GLUCOSE: 67 MG/DL (ref 70–110)
POCT GLUCOSE: 80 MG/DL (ref 70–110)
POTASSIUM SERPL-SCNC: 3.9 MMOL/L (ref 3.5–5.1)
POTASSIUM SERPL-SCNC: 4.1 MMOL/L (ref 3.5–5.1)
POTASSIUM SERPL-SCNC: 4.4 MMOL/L (ref 3.5–5.1)
PROT SERPL-MCNC: 6.3 G/DL (ref 6–8.4)
RBC # BLD AUTO: 2.82 M/UL (ref 4–5.4)
RBC # BLD AUTO: 2.85 M/UL (ref 4–5.4)
SODIUM SERPL-SCNC: 132 MMOL/L (ref 136–145)
SODIUM SERPL-SCNC: 133 MMOL/L (ref 136–145)
SODIUM SERPL-SCNC: 137 MMOL/L (ref 136–145)
TROPONIN I SERPL DL<=0.01 NG/ML-MCNC: 0.05 NG/ML (ref 0–0.03)
WBC # BLD AUTO: 5.31 K/UL (ref 3.9–12.7)
WBC # BLD AUTO: 5.83 K/UL (ref 3.9–12.7)

## 2023-05-12 PROCEDURE — 99232 PR SUBSEQUENT HOSPITAL CARE,LEVL II: ICD-10-PCS | Mod: ,,, | Performed by: INTERNAL MEDICINE

## 2023-05-12 PROCEDURE — 99232 PR SUBSEQUENT HOSPITAL CARE,LEVL II: ICD-10-PCS | Mod: ,,, | Performed by: STUDENT IN AN ORGANIZED HEALTH CARE EDUCATION/TRAINING PROGRAM

## 2023-05-12 PROCEDURE — 63600175 PHARM REV CODE 636 W HCPCS

## 2023-05-12 PROCEDURE — 25000242 PHARM REV CODE 250 ALT 637 W/ HCPCS: Performed by: INTERNAL MEDICINE

## 2023-05-12 PROCEDURE — 80048 BASIC METABOLIC PNL TOTAL CA: CPT | Mod: 91,XB | Performed by: STUDENT IN AN ORGANIZED HEALTH CARE EDUCATION/TRAINING PROGRAM

## 2023-05-12 PROCEDURE — 25000003 PHARM REV CODE 250: Performed by: HOSPITALIST

## 2023-05-12 PROCEDURE — 83605 ASSAY OF LACTIC ACID: CPT | Performed by: INTERNAL MEDICINE

## 2023-05-12 PROCEDURE — 99233 PR SUBSEQUENT HOSPITAL CARE,LEVL III: ICD-10-PCS | Mod: GC,,, | Performed by: INTERNAL MEDICINE

## 2023-05-12 PROCEDURE — 97535 SELF CARE MNGMENT TRAINING: CPT

## 2023-05-12 PROCEDURE — 93005 ELECTROCARDIOGRAM TRACING: CPT

## 2023-05-12 PROCEDURE — 93010 EKG 12-LEAD: ICD-10-PCS | Mod: ,,, | Performed by: INTERNAL MEDICINE

## 2023-05-12 PROCEDURE — 84484 ASSAY OF TROPONIN QUANT: CPT | Performed by: INTERNAL MEDICINE

## 2023-05-12 PROCEDURE — 93010 ELECTROCARDIOGRAM REPORT: CPT | Mod: ,,, | Performed by: INTERNAL MEDICINE

## 2023-05-12 PROCEDURE — 99233 SBSQ HOSP IP/OBS HIGH 50: CPT | Mod: GC,,, | Performed by: INTERNAL MEDICINE

## 2023-05-12 PROCEDURE — 20600001 HC STEP DOWN PRIVATE ROOM

## 2023-05-12 PROCEDURE — 25000003 PHARM REV CODE 250

## 2023-05-12 PROCEDURE — 36415 COLL VENOUS BLD VENIPUNCTURE: CPT | Performed by: HOSPITALIST

## 2023-05-12 PROCEDURE — 83735 ASSAY OF MAGNESIUM: CPT | Performed by: INTERNAL MEDICINE

## 2023-05-12 PROCEDURE — 63600175 PHARM REV CODE 636 W HCPCS: Performed by: STUDENT IN AN ORGANIZED HEALTH CARE EDUCATION/TRAINING PROGRAM

## 2023-05-12 PROCEDURE — 99232 SBSQ HOSP IP/OBS MODERATE 35: CPT | Mod: ,,, | Performed by: INTERNAL MEDICINE

## 2023-05-12 PROCEDURE — 80053 COMPREHEN METABOLIC PANEL: CPT | Performed by: INTERNAL MEDICINE

## 2023-05-12 PROCEDURE — 82550 ASSAY OF CK (CPK): CPT | Performed by: STUDENT IN AN ORGANIZED HEALTH CARE EDUCATION/TRAINING PROGRAM

## 2023-05-12 PROCEDURE — 36415 COLL VENOUS BLD VENIPUNCTURE: CPT | Performed by: INTERNAL MEDICINE

## 2023-05-12 PROCEDURE — 85025 COMPLETE CBC W/AUTO DIFF WBC: CPT | Mod: 91 | Performed by: INTERNAL MEDICINE

## 2023-05-12 PROCEDURE — 80048 BASIC METABOLIC PNL TOTAL CA: CPT | Mod: XB | Performed by: HOSPITALIST

## 2023-05-12 PROCEDURE — 85025 COMPLETE CBC W/AUTO DIFF WBC: CPT | Performed by: HOSPITALIST

## 2023-05-12 PROCEDURE — 36415 COLL VENOUS BLD VENIPUNCTURE: CPT | Performed by: STUDENT IN AN ORGANIZED HEALTH CARE EDUCATION/TRAINING PROGRAM

## 2023-05-12 PROCEDURE — 99232 SBSQ HOSP IP/OBS MODERATE 35: CPT | Mod: ,,, | Performed by: STUDENT IN AN ORGANIZED HEALTH CARE EDUCATION/TRAINING PROGRAM

## 2023-05-12 PROCEDURE — 25000003 PHARM REV CODE 250: Performed by: STUDENT IN AN ORGANIZED HEALTH CARE EDUCATION/TRAINING PROGRAM

## 2023-05-12 PROCEDURE — 97530 THERAPEUTIC ACTIVITIES: CPT

## 2023-05-12 PROCEDURE — 83880 ASSAY OF NATRIURETIC PEPTIDE: CPT | Performed by: INTERNAL MEDICINE

## 2023-05-12 PROCEDURE — 25000242 PHARM REV CODE 250 ALT 637 W/ HCPCS

## 2023-05-12 RX ORDER — METOLAZONE 5 MG/1
10 TABLET ORAL DAILY
Status: DISCONTINUED | OUTPATIENT
Start: 2023-05-12 | End: 2023-05-19

## 2023-05-12 RX ORDER — NITROGLYCERIN 0.4 MG/1
TABLET SUBLINGUAL
Status: COMPLETED
Start: 2023-05-12 | End: 2023-05-12

## 2023-05-12 RX ORDER — DOBUTAMINE HYDROCHLORIDE 400 MG/100ML
5 INJECTION INTRAVENOUS CONTINUOUS
Status: DISCONTINUED | OUTPATIENT
Start: 2023-05-12 | End: 2023-05-13

## 2023-05-12 RX ORDER — ACETAZOLAMIDE 250 MG/1
500 TABLET ORAL 2 TIMES DAILY
Status: DISCONTINUED | OUTPATIENT
Start: 2023-05-12 | End: 2023-05-19

## 2023-05-12 RX ADMIN — PANTOPRAZOLE SODIUM 40 MG: 40 TABLET, DELAYED RELEASE ORAL at 09:05

## 2023-05-12 RX ADMIN — FUROSEMIDE 30 MG/HR: 10 INJECTION, SOLUTION INTRAMUSCULAR; INTRAVENOUS at 09:05

## 2023-05-12 RX ADMIN — METOLAZONE 10 MG: 5 TABLET ORAL at 04:05

## 2023-05-12 RX ADMIN — APIXABAN 5 MG: 5 TABLET, FILM COATED ORAL at 09:05

## 2023-05-12 RX ADMIN — ISOSORBIDE DINITRATE 40 MG: 20 TABLET ORAL at 04:05

## 2023-05-12 RX ADMIN — HYDRALAZINE HYDROCHLORIDE 75 MG: 50 TABLET ORAL at 09:05

## 2023-05-12 RX ADMIN — METOLAZONE 5 MG: 5 TABLET ORAL at 09:05

## 2023-05-12 RX ADMIN — NITROGLYCERIN: 0.4 TABLET, ORALLY DISINTEGRATING SUBLINGUAL at 08:05

## 2023-05-12 RX ADMIN — FUROSEMIDE 240 MG: 10 INJECTION, SOLUTION INTRAMUSCULAR; INTRAVENOUS at 02:05

## 2023-05-12 RX ADMIN — ACETAZOLAMIDE 500 MG: 250 TABLET ORAL at 09:05

## 2023-05-12 RX ADMIN — ATORVASTATIN CALCIUM 80 MG: 40 TABLET, FILM COATED ORAL at 09:05

## 2023-05-12 RX ADMIN — CARVEDILOL 6.25 MG: 6.25 TABLET, FILM COATED ORAL at 09:05

## 2023-05-12 RX ADMIN — CLOPIDOGREL BISULFATE 75 MG: 75 TABLET ORAL at 09:05

## 2023-05-12 RX ADMIN — DOBUTAMINE HYDROCHLORIDE 5 MCG/KG/MIN: 400 INJECTION INTRAVENOUS at 04:05

## 2023-05-12 RX ADMIN — SERTRALINE HYDROCHLORIDE 50 MG: 50 TABLET ORAL at 09:05

## 2023-05-12 RX ADMIN — NITROGLYCERIN 0.4 MG: 0.4 TABLET, ORALLY DISINTEGRATING SUBLINGUAL at 09:05

## 2023-05-12 RX ADMIN — DOCUSATE SODIUM 100 MG: 100 CAPSULE, LIQUID FILLED ORAL at 09:05

## 2023-05-12 RX ADMIN — ISOSORBIDE DINITRATE 40 MG: 20 TABLET ORAL at 09:05

## 2023-05-12 RX ADMIN — HYDRALAZINE HYDROCHLORIDE 75 MG: 50 TABLET ORAL at 04:05

## 2023-05-12 RX ADMIN — ALLOPURINOL 100 MG: 100 TABLET ORAL at 09:05

## 2023-05-12 NOTE — PROGRESS NOTES
[facility] - [facility]iology Stepdown  Nephrology  Progress Note    Patient Name: Suyapa Connelly  MRN: [id number]  Admission Date: 5/1/2023  Hospital Length of Stay: 9 days  Attending Provider: Ruma Schulte MD   Primary Care Physician: Magen Christensen MD  Principal Problem:Acute on chronic combined systolic and diastolic heart failure    Subjective:     HPI: 57 y.o. female with past medical history of CAD s/p CABG, CKD, DM, HTN, HLD, s/p bilateral AKA who presnets with chest pain, difficulty breathing, leg swelling. Her most recent ECHO revealed severe systolic dysfunction with EF of 15%. She has been started on diuretics with IV lasix and PO metolazone with adequate urinary response however her renal function has been deteriorating throughout the admission. She reports requiring dialysis at one time before, around the time she underwent her AKAs. Her baseline serum creatinine is in the 2s and it has trended up to 5 during this admission. Nephrology consulted for further evaluation of ROSLYN on CKD.         Interval History: Seen and evaluated by bedside. No acute events overnight. Good urinary response to addition of metolazone however serum creatinine continues trending up, at 5.7 this morning.     Review of patient's allergies indicates:   Allergen Reactions    Ciprofloxacin Itching    Contrast media      Kidney injury    Iodine      Kidney injury     Current Facility-Administered Medications   Medication Frequency    acetaminophen tablet 1,000 mg Q6H PRN    acetaminophen tablet 650 mg Q4H PRN    allopurinol split tablet 100 mg Daily    amitriptyline tablet 50 mg QHS    apixaban tablet 5 mg BID    atorvastatin tablet 80 mg Daily    carvediloL tablet 6.25 mg BID WM    clopidogreL tablet 75 mg Daily    dextrose 10% bolus 125 mL 125 mL PRN    dextrose 10% bolus 250 mL 250 mL PRN    dextrose 40 % gel 15,000 mg PRN    dextrose 40 % gel 30,000 mg PRN    docusate sodium capsule 100 mg BID    furosemide (LASIX) 500 mg in 50  mL infusion (conc: 10 mg/mL) Continuous    glucagon (human recombinant) injection 1 mg PRN    heparin infusion 1,000 units/500 ml in 0.9% NaCl (on sterile field) PRN    hydrALAZINE tablet 75 mg TID    insulin aspart U-100 pen 0-5 Units QID (AC + HS) PRN    insulin aspart U-100 pen 3 Units TIDWM    insulin detemir U-100 (Levemir) pen 8 Units QHS    isosorbide dinitrate tablet 40 mg TID    LIDOcaine HCL 20 mg/ml (2%) injection PRN    melatonin tablet 6 mg Nightly PRN    metOLazone tablet 5 mg Daily    midazolam injection PRN    naloxone 0.4 mg/mL injection 0.02 mg PRN    ondansetron disintegrating tablet 8 mg Q8H PRN    pantoprazole EC tablet 40 mg Daily    sertraline tablet 50 mg Daily    sodium chloride 0.9% flush 10 mL Q12H PRN    sodium chloride 0.9% flush 10 mL PRN       Objective:     Vital Signs (Most Recent):  Temp: 98 °F (36.7 °C) (05/12/23 0741)  Pulse: 89 (05/12/23 0741)  Resp: 18 (05/12/23 0741)  BP: (!) 121/49 (05/12/23 0741)  SpO2: 96 % (05/12/23 0741) Vital Signs (24h Range):  Temp:  [97.5 °F (36.4 °C)-98.2 °F (36.8 °C)] 98 °F (36.7 °C)  Pulse:  [86-95] 89  Resp:  [18-19] 18  SpO2:  [92 %-98 %] 96 %  BP: ()/(44-69) 121/49     Weight: 93.9 kg (207 lb 0.9 oz) (05/12/23 0421)  Body mass index is 34.46 kg/m².  Body surface area is 2.08 meters squared.    I/O last 3 completed shifts:  In: 2090.6 [P.O.:1920; I.V.:170.6]  Out: 2800 [Urine:2800]     Physical Exam  Vitals and nursing note reviewed.   Constitutional:       General: She is not in acute distress.     Appearance: Normal appearance. She is obese. She is not ill-appearing or diaphoretic.   HENT:      Head: Normocephalic and atraumatic.      Right Ear: External ear normal.      Left Ear: External ear normal.      Nose: Nose normal.      Mouth/Throat:      Mouth: Mucous membranes are moist.      Pharynx: Oropharynx is clear.   Eyes:      General: No scleral icterus.     Extraocular Movements: Extraocular movements intact.      Conjunctiva/sclera:  Conjunctivae normal.      Pupils: Pupils are equal, round, and reactive to light.   Cardiovascular:      Rate and Rhythm: Normal rate and regular rhythm.      Pulses: Normal pulses.      Heart sounds: Normal heart sounds. No murmur heard.     Comments: LIJ trialysis line   Pulmonary:      Effort: Pulmonary effort is normal. No respiratory distress.      Breath sounds: Normal breath sounds. No stridor. No wheezing or rhonchi.   Chest:      Chest wall: No tenderness.   Abdominal:      General: Abdomen is flat. There is no distension.      Palpations: Abdomen is soft.      Tenderness: There is no abdominal tenderness. There is no guarding or rebound.   Musculoskeletal:         General: Swelling present. No tenderness. Normal range of motion.      Cervical back: Normal range of motion and neck supple. No rigidity or tenderness.      Right lower leg: Edema present.      Left lower leg: Edema present.      Comments: Trigger finger R middle finger  BL AKA stump edema +2   Skin:     General: Skin is warm and dry.      Capillary Refill: Capillary refill takes less than 2 seconds.      Coloration: Skin is not jaundiced.      Findings: No erythema.   Neurological:      General: No focal deficit present.      Mental Status: She is alert and oriented to person, place, and time.      Cranial Nerves: No cranial nerve deficit.      Motor: No weakness.   Psychiatric:         Mood and Affect: Mood normal.         Behavior: Behavior normal.        Significant Labs:  CBC:   Recent Labs   Lab 05/12/23  0600   WBC 5.31   RBC 2.85*   HGB 8.6*   HCT 29.0*      *   MCH 30.2   MCHC 29.7*     CMP:   Recent Labs   Lab 05/12/23  0600   GLU 57*   CALCIUM 7.3*      K 4.1   CO2 25   CL 99   BUN 65*   CREATININE 5.7*          Assessment/Plan:     Cardiac/Vascular  * Acute on chronic combined systolic and diastolic heart failure  Management per primary       S/P CABG x 1  Management per primary       Renal/  ROSLYN (acute kidney  injury)  ROSLYN on CKD, likely ischemic, CRS and diuresis  Baseline serum creatinine in the 2s, up to 3.9 on admission   Has been trending up to 5.7 on her most recent labs  Adequate urine output while on diuretics, currently on lasix drip @ 30mg/h and Metolazone 5mg PO daily   Retroperitoneal US with no hydronephrosis   ECHO with sever systolic dysfunction, EF of 15%   RHC with PCWP of 30   Agree with diuresis for additional volume removal; can increase lasix to 240mg iv q6h, metolazone to 10mg po daily and add on acetazolamide 500mg BID   Pt at high risk of requiring hemodialysis again in the near future ir persistent deterioration of her renal function   Dose medications to GFR    Avoid nephrotoxic agents as much as possible               Jeremy Juan MD  Nephrology  [facility]    ATTENDING PHYSICIAN ATTESTATION  I have personally verified the history and examined the patient. I thoroughly reviewed the demographic, clinical, laboratorial and imaging information available in medical records. I agree with the assessment and recommendations provided by the subspecialty resident who was under my supervision.

## 2023-05-12 NOTE — SUBJECTIVE & OBJECTIVE
Interval History: Patient reports stable HARTMAN. UOP over past 24hrs 2.1L. Cr continues to worsen; now at 5.7.     Review of Systems   Cardiovascular:  Positive for dyspnea on exertion, leg swelling and orthopnea. Negative for chest pain.   Respiratory:  Positive for shortness of breath. Negative for cough and sputum production.    Gastrointestinal:  Negative for nausea and vomiting.   Genitourinary:  Negative for dysuria and frequency.   Objective:     Vital Signs (Most Recent):  Temp: 98 °F (36.7 °C) (05/12/23 0741)  Pulse: 89 (05/12/23 0741)  Resp: 18 (05/12/23 0741)  BP: (!) 121/49 (05/12/23 0741)  SpO2: 96 % (05/12/23 0741) Vital Signs (24h Range):  Temp:  [97.5 °F (36.4 °C)-98.2 °F (36.8 °C)] 98 °F (36.7 °C)  Pulse:  [86-95] 89  Resp:  [18-19] 18  SpO2:  [96 %-97 %] 96 %  BP: (110-122)/(49-69) 121/49     Weight: 93.9 kg (207 lb 0.9 oz)  Body mass index is 34.46 kg/m².     SpO2: 96 %         Intake/Output Summary (Last 24 hours) at 5/12/2023 1122  Last data filed at 5/12/2023 0605  Gross per 24 hour   Intake 610.59 ml   Output 2100 ml   Net -1489.41 ml         Lines/Drains/Airways       Drain  Duration             Female External Urinary Catheter 05/03/23 0805 9 days              Peripheral Intravenous Line  Duration                  Peripheral IV - Single Lumen 05/10/23 1724 20 G;1 3/4 in Right Forearm 1 day                       Physical Exam  Vitals and nursing note reviewed.   Constitutional:       General: She is not in acute distress.     Appearance: She is obese. She is not ill-appearing, toxic-appearing or diaphoretic.   HENT:      Head: Normocephalic and atraumatic.      Mouth/Throat:      Mouth: Mucous membranes are moist.   Eyes:      General: No scleral icterus.        Right eye: No discharge.         Left eye: No discharge.   Neck:      Vascular: JVD present.   Cardiovascular:      Rate and Rhythm: Normal rate and regular rhythm.   Pulmonary:      Effort: Pulmonary effort is normal. No respiratory  distress.      Breath sounds: No wheezing.   Abdominal:      General: Bowel sounds are normal. There is distension.      Palpations: Abdomen is soft.      Tenderness: There is abdominal tenderness. There is no guarding.   Musculoskeletal:      Cervical back: No rigidity.      Right lower leg: Edema present.      Left lower leg: Edema present.      Comments: Patient with bilateral AKA. Minimal edema of thighs   Skin:     General: Skin is warm.      Coloration: Skin is not jaundiced.   Neurological:      Mental Status: She is alert and oriented to person, place, and time. Mental status is at baseline.   Psychiatric:         Mood and Affect: Mood normal.         Behavior: Behavior normal.          Significant Labs: CMP   Recent Labs   Lab 05/11/23  0430 05/12/23  0600    137   K 4.1 4.1   CL 99 99   CO2 25 25   * 57*   BUN 69* 65*   CREATININE 5.0* 5.7*   CALCIUM 7.2* 7.3*   ANIONGAP 13 13       Significant Imaging:  Reviewed

## 2023-05-12 NOTE — ASSESSMENT & PLAN NOTE
Last Echo 6/21 showing     The estimated ejection fraction is 55%.   The left ventricle is normal in size with normal systolic function.   Normal left ventricular diastolic function.   Normal right ventricular size with normal right ventricular systolic function.   Mild tricuspid regurgitation.   The estimated PA systolic pressure is 31 mmHg.   Normal central venous pressure (3 mmHg).    Echo    Result Date: 5/2/2023  · The left ventricle is mildly enlarged with severely decreased systolic   function. The estimated ejection fraction is 15%.  · There is severe left ventricular global hypokinesis.  · Normal right ventricular size with low normal right ventricular systolic   function.  · Grade I left ventricular diastolic dysfunction.  · Biatrial enlargement.  · Mild-to-moderate mitral regurgitation.  · Severe tricuspid regurgitation.  · The estimated PA systolic pressure is 43 mmHg.  · Elevated central venous pressure (15 mmHg).         RHC  CVP 15  Wedge 25-32      -IV Diuresis with Lasix 30/hr  -Add metolazone 5mg  -Dobutamine 5 nontitrating  -dc coreg  -Consult nephrology  -Qq4 BMPs with electrolyte repletion as needed; goal K>4, Mg>2  -Cardiology consult for acute decompensated heart failure with reduced EF   -Transition to lasix 30/hr for daily goal of net negative 2-3L  - Inpatient vs outpatient stress testing once patient euvolemic  - Strict I/Os with daily weights  - 1.5L fluid restriction, low Na diet  - continue GDMT when possible  -Tylenol 1g up to 3 times a day  -Nephrology consult and appreciate recs

## 2023-05-12 NOTE — PLAN OF CARE
Problem: Occupational Therapy  Goal: Occupational Therapy Goal  Description: Goals to be met by: 5/25     Patient will increase functional independence with ADLs by performing:    UE Dressing with Modified [place].  LE Dressing with Modified [place].  Sitting at edge of bed x10 minutes with Stand-by Assistance. - Met  Rolling to Bilateral with Stand-by Assistance.   Supine to sit with [place].    Outcome: Ongoing, Progressing     Continue OT POC

## 2023-05-12 NOTE — ASSESSMENT & PLAN NOTE
57 y.o F w/ hx of CAD s/p CABG LIMA-LAD 01/2020 & PCI  DESx1 to MidCx), T2DM, diastolic and systolic heart failure(EF15%), PAD s/p RSFA and pop intervention 10/2021, then bilateral AKA, PAF, HTN, HLD here with volume overload in the setting of medication nonadherence.    TTE this admission showed EF of 15% (previously 55%), biatrial enlargement, CVP 15, PASP 43, severe tricuspid regurgitation, not noted on prior in 2021  .2, HgA1c 6.2    Intake/Output Summary (Last 24 hours) at 5/12/2023 1124  Last data filed at 5/12/2023 0605  Gross per 24 hour   Intake 610.59 ml   Output 2100 ml   Net -1489.41 ml     Net IO Since Admission: -9,446.32 mL [05/12/23 1124]    Patient has improved significantly in the last few days on a Lasix gtt, she is able to lie flat for >5 mins.     Recommendations:  - Continue Furosemide gtt at 30mg/hr  - Continue Bidil hydralazine 75 mg + isosorbide dinitrate 40mg TID  - Strict I/Os  - Elevated wedge per RHC; continue aggressive diuresis, may need HD   - PET stress testing once patient euvolemic, either inpatient or outpatient  - 1.5L fluid restriction, low Na diet  - further decreasing renal function - recommend d/c Coreg 6.25 BID and adding dobutamine gtt at 5 (non-titrating) to increase renal perfusion  - Optimize GDMT when patient euvolemic and renal function improves

## 2023-05-12 NOTE — PT/OT/SLP PROGRESS
"Occupational Therapy   Treatment    Name: Suyapa Connelly  MRN: [id number]  Admitting Diagnosis:  Acute on chronic combined systolic and diastolic heart failure  2 Days Post-Op    Recommendations:     Discharge Recommendations: other (see comments)  Discharge Equipment Recommendations:  drop arm commode, hospital bed, lift device, other (see comments) (Pt wants a power chair)  Barriers to discharge:  Other (Comment) (increased skilled assistance required)    Assessment:     Suyapa Connelly is a 57 y.o. female with a medical diagnosis of Acute on chronic combined systolic and diastolic heart failure.  Pt tolerated session well and without incident, performing EOB grooming tasks with Supervision.  She was tearful about her current condition.  She continues to require (A) for ADLs and mobility.  She presents with the following.  Performance deficits affecting function are weakness, impaired endurance, impaired self care skills, impaired functional mobility, impaired balance, impaired cardiopulmonary response to activity.     Rehab Prognosis:  Good; patient would benefit from acute skilled OT services to address these deficits and reach maximum level of function.       Plan:     Patient to be seen 4 x/week to address the above listed problems via self-care/home management, therapeutic activities, therapeutic exercises, neuromuscular re-education  Plan of Care Expires: 06/07/23  Plan of Care Reviewed with: patient    Subjective     Chief Complaint: "I'm tired of being sick."  Patient/Family Comments/goals: "I'll do what I have to do so I can get out of here."  Pain/Comfort:  Pain Rating 1: 0/10  Pain Rating Post-Intervention 1: 0/10    Objective:     Communicated with: nursing prior to session.  Patient found HOB elevated with PureWick, telemetry, peripheral IV upon OT entry to room.    General Precautions: Standard, fall    Orthopedic Precautions:N/A  Braces: N/A  Respiratory Status: Room air     Occupational " Performance:     Bed Mobility:    Patient completed Supine to Sit to the R with maximal assistance  Patient completed Sit to Supine with stand by assistance    Functional Mobility/Transfers:  Not performed - medical and environmental limitations     Activities of Daily Living:  Grooming: supervision to perform oral care and wash her face while seated EOB with tray table setup    [facility] 6 Click ADL: 13    Treatment & Education:  - Therapeutic listening regarding pt's condition and how it affects her functionally.    Pt edu on role of OT, POC, safety when performing self care tasks, benefit of performing EOB activity, and safety when performing bed mobility.    - Self care tasks completed-- as noted above      Patient left HOB elevated with all lines intact, call button in reach, and nursing notified    GOALS:   Multidisciplinary Problems       Occupational Therapy Goals          Problem: Occupational Therapy    Goal Priority Disciplines Outcome Interventions   Occupational Therapy Goal     OT, PT/OT Ongoing, Progressing    Description: Goals to be met by: 5/25     Patient will increase functional independence with ADLs by performing:    UE Dressing with Modified [place].  LE Dressing with Modified [place].  Sitting at edge of bed x10 minutes with Stand-by Assistance. - Met  Rolling to Bilateral with Stand-by Assistance.   Supine to sit with [place].                         Time Tracking:     OT Date of Treatment: 05/12/23  OT Start Time: 1449  OT Stop Time: 1512  OT Total Time (min): 23 min    Billable Minutes:Self Care/Home Management 13 min  Therapeutic Activity 10 min    OT/MACKENZIE: OT          5/12/2023

## 2023-05-12 NOTE — PROGRESS NOTES
[facility] - [facility] Step[facility]  Cardiology  Progress Note    Patient Name: Suyapa Connelly  MRN: [id number]  Admission Date: 5/1/2023  Hospital Length of Stay: 9 days  Code Status: Full Code   Attending Physician: Ruma Schulte MD   Primary Care Physician: Magen Christensen MD  Expected Discharge Date: 5/15/2023  Principal Problem:Acute on chronic combined systolic and diastolic heart failure    Subjective:     Interval History: Patient reports stable HARTMAN. UOP over past 24hrs 2.1L. Cr continues to worsen; now at 5.7.     Review of Systems   Cardiovascular:  Positive for dyspnea on exertion, leg swelling and orthopnea. Negative for chest pain.   Respiratory:  Positive for shortness of breath. Negative for cough and sputum production.    Gastrointestinal:  Negative for nausea and vomiting.   Genitourinary:  Negative for dysuria and frequency.   Objective:     Vital Signs (Most Recent):  Temp: 98 °F (36.7 °C) (05/12/23 0741)  Pulse: 89 (05/12/23 0741)  Resp: 18 (05/12/23 0741)  BP: (!) 121/49 (05/12/23 0741)  SpO2: 96 % (05/12/23 0741) Vital Signs (24h Range):  Temp:  [97.5 °F (36.4 °C)-98.2 °F (36.8 °C)] 98 °F (36.7 °C)  Pulse:  [86-95] 89  Resp:  [18-19] 18  SpO2:  [96 %-97 %] 96 %  BP: (110-122)/(49-69) 121/49     Weight: 93.9 kg (207 lb 0.9 oz)  Body mass index is 34.46 kg/m².     SpO2: 96 %         Intake/Output Summary (Last 24 hours) at 5/12/2023 1122  Last data filed at 5/12/2023 0605  Gross per 24 hour   Intake 610.59 ml   Output 2100 ml   Net -1489.41 ml         Lines/Drains/Airways       Drain  Duration             Female External Urinary Catheter 05/03/23 0805 9 days              Peripheral Intravenous Line  Duration                  Peripheral IV - Single Lumen 05/10/23 1724 20 G;1 3/4 in Right Forearm 1 day                       Physical Exam  Vitals and nursing note reviewed.   Constitutional:       General: She is not in acute distress.     Appearance: She is obese. She is not ill-appearing,  toxic-appearing or diaphoretic.   HENT:      Head: Normocephalic and atraumatic.      Mouth/Throat:      Mouth: Mucous membranes are moist.   Eyes:      General: No scleral icterus.        Right eye: No discharge.         Left eye: No discharge.   Neck:      Vascular: JVD present.   Cardiovascular:      Rate and Rhythm: Normal rate and regular rhythm.   Pulmonary:      Effort: Pulmonary effort is normal. No respiratory distress.      Breath sounds: No wheezing.   Abdominal:      General: Bowel sounds are normal. There is distension.      Palpations: Abdomen is soft.      Tenderness: There is abdominal tenderness. There is no guarding.   Musculoskeletal:      Cervical back: No rigidity.      Right lower leg: Edema present.      Left lower leg: Edema present.      Comments: Patient with bilateral AKA. Minimal edema of thighs   Skin:     General: Skin is warm.      Coloration: Skin is not jaundiced.   Neurological:      Mental Status: She is alert and oriented to person, place, and time. Mental status is at baseline.   Psychiatric:         Mood and Affect: Mood normal.         Behavior: Behavior normal.          Significant Labs: CMP   Recent Labs   Lab 05/11/23  0430 05/12/23  0600    137   K 4.1 4.1   CL 99 99   CO2 25 25   * 57*   BUN 69* 65*   CREATININE 5.0* 5.7*   CALCIUM 7.2* 7.3*   ANIONGAP 13 13       Significant Imaging:  Reviewed    Assessment and Plan:       * Acute on chronic combined systolic and diastolic heart failure  57 y.o F w/ hx of CAD s/p CABG LIMA-LAD 01/2020 & PCI  DESx1 to MidCx), T2DM, diastolic and systolic heart failure(EF15%), PAD s/p RSFA and pop intervention 10/2021, then bilateral AKA, PAF, HTN, HLD here with volume overload in the setting of medication nonadherence.    TTE this admission showed EF of 15% (previously 55%), biatrial enlargement, CVP 15, PASP 43, severe tricuspid regurgitation, not noted on prior in 2021  .2, HgA1c 6.2    Intake/Output Summary (Last 24  "hours) at 5/12/2023 1124  Last data filed at 5/12/2023 0605  Gross per 24 hour   Intake 610.59 ml   Output 2100 ml   Net -1489.41 ml     Net IO Since Admission: -9,446.32 mL [05/12/23 1124]    Patient has improved significantly in the last few days on a Lasix gtt, she is able to lie flat for >5 mins.     Recommendations:  - Continue Furosemide gtt at 30mg/hr  - Continue Bidil hydralazine 75 mg + isosorbide dinitrate 40mg TID  - Strict I/Os  - Elevated wedge per RHC; continue aggressive diuresis, may need HD   - PET stress testing once patient euvolemic, either inpatient or outpatient  - 1.5L fluid restriction, low Na diet  - further decreasing renal function - recommend d/c Coreg 6.25 BID and adding dobutamine gtt at 5 (non-titrating) to increase renal perfusion  - Optimize GDMT when patient euvolemic and renal function improves      Paroxysmal atrial fibrillation  Per chart review from notes in 03/2021, hx of Afib "post op after CABG. Oral amiodarone discontinued and not on NOAC".   CHADSVASC score:5    - Continue Eliquis 5mg BID and continue clopidogrel, stop aspirin  - Continue rate control with Carvedilol        VTE Risk Mitigation (From admission, onward)         Ordered     apixaban tablet 5 mg  2 times daily         05/11/23 1329     IP VTE HIGH RISK PATIENT  Once         05/07/23 1547                Ogla Yost MD  Cardiology  [facility] - [facility]  "

## 2023-05-12 NOTE — ASSESSMENT & PLAN NOTE
ROSLYN on CKD, likely ischemic, CRS and diuresis  Baseline serum creatinine in the 2s, up to 3.9 on admission   Has been trending up to 5.7 on her most recent labs  Adequate urine output while on diuretics, currently on lasix drip @ 30mg/h and Metolazone 5mg PO daily   Retroperitoneal US with no hydronephrosis   ECHO with sever systolic dysfunction, EF of 15%   RHC with PCWP of 30   Agree with diuresis for additional volume removal; can increase lasix to 240mg iv q6h, metolazone to 10mg po daily and add on acetazolamide 500mg BID   Pt at high risk of requiring hemodialysis again in the near future ir persistent deterioration of her renal function   Dose medications to GFR    Avoid nephrotoxic agents as much as possible

## 2023-05-12 NOTE — SUBJECTIVE & OBJECTIVE
Interval History: Pt still having swelling and pain. Putting out 2L yesterday. Put out 600ml in the room this morning. Breathing has improved as well.     Review of Systems   Constitutional:  Negative for chills, fatigue and fever.   HENT:  Negative for congestion, dental problem, facial swelling, postnasal drip, rhinorrhea, sore throat and trouble swallowing.    Eyes:  Negative for photophobia and pain.   Respiratory:  Negative for cough, choking, chest tightness, shortness of breath and wheezing.    Cardiovascular:  Positive for leg swelling. Negative for chest pain.   Gastrointestinal:  Negative for abdominal distention, abdominal pain, diarrhea, nausea and vomiting.   Endocrine: Negative.    Genitourinary:  Negative for dysuria, flank pain, frequency and hematuria.   Musculoskeletal:  Negative for back pain.   Skin:  Negative for pallor and rash.   Allergic/Immunologic: Negative.    Neurological:  Negative for dizziness, syncope, weakness, numbness and headaches.   Psychiatric/Behavioral:  The patient is not nervous/anxious.    Objective:     Vital Signs (Most Recent):  Temp: 97.9 °F (36.6 °C) (05/12/23 1138)  Pulse: (!) 18 (05/12/23 1138)  Resp: 18 (05/12/23 0741)  BP: (!) 106/47 (05/12/23 1138)  SpO2: 95 % (05/12/23 1138) Vital Signs (24h Range):  Temp:  [97.5 °F (36.4 °C)-98.2 °F (36.8 °C)] 97.9 °F (36.6 °C)  Pulse:  [18-95] 18  Resp:  [18-19] 18  SpO2:  [95 %-97 %] 95 %  BP: (106-122)/(47-69) 106/47     Weight: 93.9 kg (207 lb 0.9 oz)  Body mass index is 34.46 kg/m².    Intake/Output Summary (Last 24 hours) at 5/12/2023 1318  Last data filed at 5/12/2023 0605  Gross per 24 hour   Intake 610.59 ml   Output 2100 ml   Net -1489.41 ml         Physical Exam  Vitals and nursing note reviewed.   Constitutional:       General: She is not in acute distress.     Appearance: Normal appearance. She is obese. She is not ill-appearing or diaphoretic.   HENT:      Head: Normocephalic and atraumatic.      Right Ear:  External ear normal.      Left Ear: External ear normal.      Nose: Nose normal.      Mouth/Throat:      Mouth: Mucous membranes are moist.      Pharynx: Oropharynx is clear.   Eyes:      General: No scleral icterus.     Extraocular Movements: Extraocular movements intact.      Conjunctiva/sclera: Conjunctivae normal.      Pupils: Pupils are equal, round, and reactive to light.   Cardiovascular:      Rate and Rhythm: Normal rate and regular rhythm.      Pulses: Normal pulses.      Heart sounds: Normal heart sounds. No murmur heard.  Pulmonary:      Effort: Pulmonary effort is normal. No respiratory distress.      Breath sounds: No stridor. Rales present. No wheezing or rhonchi.   Chest:      Chest wall: No tenderness.   Abdominal:      General: Abdomen is flat. There is no distension.      Palpations: Abdomen is soft.      Tenderness: There is no abdominal tenderness. There is no guarding or rebound.   Musculoskeletal:         General: Swelling present. No tenderness. Normal range of motion.      Cervical back: Normal range of motion and neck supple. No rigidity or tenderness.      Right lower leg: Edema present.      Left lower leg: Edema present.      Comments: Trigger finger R middle finger   Skin:     General: Skin is warm and dry.      Capillary Refill: Capillary refill takes less than 2 seconds.      Coloration: Skin is not jaundiced.      Findings: No erythema.   Neurological:      General: No focal deficit present.      Mental Status: She is alert and oriented to person, place, and time.      Cranial Nerves: No cranial nerve deficit.      Motor: No weakness.   Psychiatric:         Mood and Affect: Mood normal.         Behavior: Behavior normal.           Significant Labs: All pertinent labs within the past 24 hours have been reviewed.    Significant Imaging: I have reviewed all pertinent imaging results/findings within the past 24 hours.

## 2023-05-12 NOTE — PT/OT/SLP PROGRESS
Physical Therapy Treatment    Patient Name:  Suyapa Connelly   MRN:  [id number]    Recommendations:     Discharge Recommendations: other (see comments)  Discharge Equipment Recommendations: drop arm commode, hospital bed, lift device  Barriers to discharge:  high level of caregiver support required     Assessment:     Suyapa Connelly is a 57 y.o. female admitted with a medical diagnosis of Acute on chronic combined systolic and diastolic heart failure.  She presents with the following impairments/functional limitations: weakness, impaired endurance, impaired self care skills, impaired functional mobility, gait instability, impaired balance, pain, impaired cardiopulmonary response to activity. Pt is very motivated to participate in therapy and hopes to improve in ability to transfer to wheelchair with assist of spouse.     Rehab Prognosis: Good; patient would benefit from acute skilled PT services to address these deficits and reach maximum level of function.      Recent Surgery: Procedure(s) (LRB):  INSERTION, CATHETER, RIGHT HEART (Right)  Insertion, Catheter, Central Venous, Hemodialysis 1 Day Post-Op    Plan:     During this hospitalization, patient to be seen 3 x/week to address the identified rehab impairments via therapeutic activities, therapeutic exercises, neuromuscular re-education, wheelchair management/training and progress toward the following goals:    Plan of Care Expires:  06/06/23    Subjective     Chief Complaint: pain (pt reported abdominal and B hip pain)   Patient/Family Comments/goals: to reduce pain and improve mobility. Pt's spouse stated that it would be helpful if pt had a power wheelchair.   Pain/Comfort:  Pain Rating 1:  (did not rate)  Location - Side 1: Bilateral  Location 1: hip  Pain Addressed 1: Reposition, Distraction, Nurse notified  Pain Rating Post-Intervention 1:  (did not rate)      Objective:     Communicated with RN prior to session.  Patient found HOB elevated with  PureWick, central line, peripheral IV upon PT entry to room.     General Precautions: Standard, fall  Orthopedic Precautions: N/A  Braces: N/A  Respiratory Status: Room air       Functional Mobility:    Bed Mobility:   Rolling: to right and left with moderate assistance  Supine > Sit: maximal assistance  Sit > Supine: moderate assistance    Transfers: N/T; pt with increased abdominal pain and limited tolerance for activity after EOB exercises     Balance:   Sitting balance: Pt tolerated sitting EOB x ~10 minutes with contact guard assistance and progression to stand by assistance. Verbal and tactile cueing provided to facilitate improvement in upright posture and trunk control. Pt performed forward reaching outside MARIO and across midline x 5 trials bilaterally. Pt tolerated activity well, but did endorse continued abdominal pain with sitting in upright position.       AM-PAC 6 CLICK MOBILITY  Turning over in bed (including adjusting bedclothes, sheets and blankets)?: 2  Sitting down on and standing up from a chair with arms (e.g., wheelchair, bedside commode, etc.): 1  Moving from lying on back to sitting on the side of the bed?: 2  Moving to and from a bed to a chair (including a wheelchair)?: 2  Need to walk in hospital room?: 1  Climbing 3-5 steps with a railing?: 1  Basic Mobility Total Score: 9       Treatment & Education:  Neuromuscular re-ed facilitated during static and dynamic EOB activities. See above for details.     Therapeutic activities aimed to increase pt's independence, safety, and efficiency with bed mobility and EOB activities. See above for assistance levels. Pt educated on seated and supine therex recommendations.     Patient left HOB elevated with all lines intact, call button in reach, and RN notified..    GOALS:   Multidisciplinary Problems       Physical Therapy Goals          Problem: Physical Therapy    Goal Priority Disciplines Outcome Goal Variances Interventions   Physical Therapy Goal      PT, PT/OT Ongoing, Progressing     Description: Goals to be met by: 2023     Patient will increase functional independence with mobility by performin. Supine to sit with Minimal Assistance.  2. Sit to supine with Minimal Assistance.  3. Bed to chair transfer with Minimal Assistance using LRAD.  4. Wheelchair propulsion x50 feet with Minimal Assistance using bilateral upper extremities.  5. Spouse verbalizes and demonstrates understanding on family training with focus on transfers.                          Time Tracking:     PT Received On: 23  PT Start Time: 942     PT Stop Time: 1005   PT Total Time (min): 23 min     Billable Minutes: Therapeutic Activity 13 min and Neuromuscular Re-education 10 min     Treatment Type: Treatment  PT/PTA: PT     Number of PTA visits since last PT visit: 0     2023

## 2023-05-12 NOTE — PROGRESS NOTES
[facility] - Cardiology [facility] Medicine  Progress Note    Patient Name: Suyapa Connelly  MRN: [id number]  Patient Class: IP- Inpatient   Admission Date: 5/1/2023  Length of Stay: 9 days  Attending Physician: Ruma Schulte MD  Primary Care Provider: Magen Christensen MD        Subjective:     Principal Problem:Acute on chronic combined systolic and diastolic heart failure        HPI:  57 y.o. female with past medical history of CAD s/p CABG, CKD, DM, HTN, HLD, s/p bilateral AKA who presnets with chest pain, difficulty breathing, leg swelling.     Per ED She has been unable to access any of her medications for the last 3 months. Over the last few months she has noted worsening shortness of breath, particulatly orthopnea, and leg swelling. In the last two weeks she endorses associated abdominal pain and swelling, reduced appetite, and pleuritic chest pain with deep inspiration. She endorses occasional lighthededness with changes in position. Last week she had an episode where she was unable to breathe for about two minutes which was very upsetting for her and her , but she recovered and did not seek care at that time. Today, she complains of significant difficulty breathing, swelling in her legs and abdomen, and reduced appetite.     No headaches, visual changes, URI symptoms, palpitations, nausea, vomiting, diarrhea, blood in her stool, dysuria, hematuria, numbness or weakness in her extremities. She does endorse occasional paresthesias associated with her chronic phantom limb pain. She additionally has had increased difficulties with depression recently, denies suicidal or homicidal ideation, but has had considerable stress and low mood.      Of note, the patient saw a new primary care provider 4/21 for similar concerns who recommended she proceed to the ED at that time, but they were unable due to financial and geographic concerns. She lives two hours away from [facility] and her  cares for her  and elderly mother with dementia. She has had decreased access to medical care over the last year, and unable to access most of her prescriptions. She had a 90 day supply of four medications (lasix, statin, clopidogrel, sertraline) but these were accidentally lost and she has been unable to afford a replacement. They present today for evaluation and to reestablish care and access to her prescriptions.     Labs on admission significant for an elevated BNP, hypocalcemia which corrected is 8.3, significant elevation in Creatinine and a slight elevation of troponin    CXR  Cardiomegaly with pulmonary interstitial edema, suggestive of pulmonary edema secondary to CHF.      Overview/Hospital Course:  Diuresis initiated with Lasix IV.  1.2L UOP in the past 24 hours.  Vitals reassuring with adequate sats on ambient air.  Still very edematious; Cardiology recs lasix gtt.  GDMT initiated. Pt given 2mg morphine for moderate to severe pain in legs from swelling. Diuresis increased to 40mg/hr due to inadequate urine output. Cr continued to increase. Cr stable at new Lasix rate. Diuresed 1.9L with some improvement in clinical status. S/p RHC that showed wedge of 26-32 with CVP of 15. Will continue diuresis with Lasix 30/hr with the addition of metolazone.       Interval History: Pt still having swelling and pain. Putting out 2L yesterday. Put out 600ml in the room this morning. Breathing has improved as well.     Review of Systems   Constitutional:  Negative for chills, fatigue and fever.   HENT:  Negative for congestion, dental problem, facial swelling, postnasal drip, rhinorrhea, sore throat and trouble swallowing.    Eyes:  Negative for photophobia and pain.   Respiratory:  Negative for cough, choking, chest tightness, shortness of breath and wheezing.    Cardiovascular:  Positive for leg swelling. Negative for chest pain.   Gastrointestinal:  Negative for abdominal distention, abdominal pain, diarrhea, nausea and vomiting.    Endocrine: Negative.    Genitourinary:  Negative for dysuria, flank pain, frequency and hematuria.   Musculoskeletal:  Negative for back pain.   Skin:  Negative for pallor and rash.   Allergic/Immunologic: Negative.    Neurological:  Negative for dizziness, syncope, weakness, numbness and headaches.   Psychiatric/Behavioral:  The patient is not nervous/anxious.    Objective:     Vital Signs (Most Recent):  Temp: 97.9 °F (36.6 °C) (05/12/23 1138)  Pulse: (!) 18 (05/12/23 1138)  Resp: 18 (05/12/23 0741)  BP: (!) 106/47 (05/12/23 1138)  SpO2: 95 % (05/12/23 1138) Vital Signs (24h Range):  Temp:  [97.5 °F (36.4 °C)-98.2 °F (36.8 °C)] 97.9 °F (36.6 °C)  Pulse:  [18-95] 18  Resp:  [18-19] 18  SpO2:  [95 %-97 %] 95 %  BP: (106-122)/(47-69) 106/47     Weight: 93.9 kg (207 lb 0.9 oz)  Body mass index is 34.46 kg/m².    Intake/Output Summary (Last 24 hours) at 5/12/2023 1318  Last data filed at 5/12/2023 0605  Gross per 24 hour   Intake 610.59 ml   Output 2100 ml   Net -1489.41 ml         Physical Exam  Vitals and nursing note reviewed.   Constitutional:       General: She is not in acute distress.     Appearance: Normal appearance. She is obese. She is not ill-appearing or diaphoretic.   HENT:      Head: Normocephalic and atraumatic.      Right Ear: External ear normal.      Left Ear: External ear normal.      Nose: Nose normal.      Mouth/Throat:      Mouth: Mucous membranes are moist.      Pharynx: Oropharynx is clear.   Eyes:      General: No scleral icterus.     Extraocular Movements: Extraocular movements intact.      Conjunctiva/sclera: Conjunctivae normal.      Pupils: Pupils are equal, round, and reactive to light.   Cardiovascular:      Rate and Rhythm: Normal rate and regular rhythm.      Pulses: Normal pulses.      Heart sounds: Normal heart sounds. No murmur heard.  Pulmonary:      Effort: Pulmonary effort is normal. No respiratory distress.      Breath sounds: No stridor. Rales present. No wheezing or rhonchi.    Chest:      Chest wall: No tenderness.   Abdominal:      General: Abdomen is flat. There is no distension.      Palpations: Abdomen is soft.      Tenderness: There is no abdominal tenderness. There is no guarding or rebound.   Musculoskeletal:         General: Swelling present. No tenderness. Normal range of motion.      Cervical back: Normal range of motion and neck supple. No rigidity or tenderness.      Right lower leg: Edema present.      Left lower leg: Edema present.      Comments: Trigger finger R middle finger   Skin:     General: Skin is warm and dry.      Capillary Refill: Capillary refill takes less than 2 seconds.      Coloration: Skin is not jaundiced.      Findings: No erythema.   Neurological:      General: No focal deficit present.      Mental Status: She is alert and oriented to person, place, and time.      Cranial Nerves: No cranial nerve deficit.      Motor: No weakness.   Psychiatric:         Mood and Affect: Mood normal.         Behavior: Behavior normal.           Significant Labs: All pertinent labs within the past 24 hours have been reviewed.    Significant Imaging: I have reviewed all pertinent imaging results/findings within the past 24 hours.      Assessment/Plan:      * Acute on chronic combined systolic and diastolic heart failure  Last Echo 6/21 showing     The estimated ejection fraction is 55%.   The left ventricle is normal in size with normal systolic function.   Normal left ventricular diastolic function.   Normal right ventricular size with normal right ventricular systolic function.   Mild tricuspid regurgitation.   The estimated PA systolic pressure is 31 mmHg.   Normal central venous pressure (3 mmHg).    Echo    Result Date: 5/2/2023  · The left ventricle is mildly enlarged with severely decreased systolic   function. The estimated ejection fraction is 15%.  · There is severe left ventricular global hypokinesis.  · Normal right ventricular size with low normal right  ventricular systolic   function.  · Grade I left ventricular diastolic dysfunction.  · Biatrial enlargement.  · Mild-to-moderate mitral regurgitation.  · Severe tricuspid regurgitation.  · The estimated PA systolic pressure is 43 mmHg.  · Elevated central venous pressure (15 mmHg).         RHC  CVP 15  Wedge 25-32      -IV Diuresis with Lasix 30/hr  -Add metolazone 5mg  -Dobutamine 5 nontitrating  -dc coreg  -Consult nephrology  -Qq4 BMPs with electrolyte repletion as needed; goal K>4, Mg>2  -Cardiology consult for acute decompensated heart failure with reduced EF   -Transition to lasix 30/hr for daily goal of net negative 2-3L  - Inpatient vs outpatient stress testing once patient euvolemic  - Strict I/Os with daily weights  - 1.5L fluid restriction, low Na diet  - continue GDMT when possible  -Tylenol 1g up to 3 times a day  -Nephrology consult and appreciate recs        ROSLYN (acute kidney injury)  Pt with increased Cr to 4.7 from 2.0 baseline. Possibly due to renovascular congestion from volume overload.     -Kerns  -Strict Is/Os  -Avoid nephrotoxic agents  -Lasix 30mg/hr  -Daily CMPs        Acute hypoxemic respiratory failure  Resolved    CKD (chronic kidney disease), stage IV  History of CKD. See ROSLYN.      Above-knee amputation of right lower extremity  Prior history consistent with exam      Uses self-applied continuous glucose monitoring device  POCT glucose and daily CMPs    -Restart insulin for hyperglycemia      Dermatitis associated with moisture  Large body habitus with dermatitis to folds. Will monitor for infection.    -Zinc based powder  -Topical abx if indicated      Status post above knee amputation, left  History of.      Hypoalbuminemia  Pt with poor PO intake reported. Pt eats one meal a day and has associated nausea and vomiting. Will try antiemetics before feeds to improve intake. Will monitor daily intake and calorie replacement.     -Calorie count  -Diabetic/Renal diet  -Antiemetics before  meals; Zofran 4 IV PRN  -Monitor Qtc for prolongation with antiemetics      Impaired functional mobility and endurance  PT/OT for bedbound patient      Paroxysmal atrial fibrillation  Continue home rate control  NSR on EKG  Eliquis 5mg BID          Anemia  Asymptomatic. Higher than 8 months ago. No signs of acute bleed at this time. Haptoglobin elevated.    -CBC daily  -Transfuse if <7        S/P CABG x 1  History of. Sternal scar well healed.       CAD (coronary artery disease)  History of CAD with retrosternal chest pain. EKG negative to ST changes. Nonspecific T wave inversions.     -Cardiac tele  -EKG prn  -Bidil per Cards recs  -Continue plavix, hold ASA    CAROLIN (generalized anxiety disorder)  Continue home anxiety medications      Essential hypertension  Continue home antihypertensives      PAD (peripheral artery disease)  History of bilateral AKA.           VTE Risk Mitigation (From admission, onward)         Ordered     apixaban tablet 5 mg  2 times daily         05/11/23 1329     IP VTE HIGH RISK PATIENT  Once         05/07/23 1547                Discharge Planning   DEVAN: 5/15/2023     Code Status: Full Code   Is the patient medically ready for discharge?: No    Reason for patient still in hospital (select all that apply): Treatment and Consult recommendations  Discharge Plan A: Home with family, Home Health   Discharge Delays: None known at this time    Onur Bueno MD  Department of Hospital Medicine   [facility] - Cardiology Stepdown

## 2023-05-12 NOTE — SUBJECTIVE & OBJECTIVE
Interval History: Seen and evaluated by bedside. No acute events overnight. Good urinary response to addition of metolazone however serum creatinine continues trending up, at 5.7 this morning.     Review of patient's allergies indicates:   Allergen Reactions    Ciprofloxacin Itching    Contrast media      Kidney injury    Iodine      Kidney injury     Current Facility-Administered Medications   Medication Frequency    acetaminophen tablet 1,000 mg Q6H PRN    acetaminophen tablet 650 mg Q4H PRN    allopurinol split tablet 100 mg Daily    amitriptyline tablet 50 mg QHS    apixaban tablet 5 mg BID    atorvastatin tablet 80 mg Daily    carvediloL tablet 6.25 mg BID WM    clopidogreL tablet 75 mg Daily    dextrose 10% bolus 125 mL 125 mL PRN    dextrose 10% bolus 250 mL 250 mL PRN    dextrose 40 % gel 15,000 mg PRN    dextrose 40 % gel 30,000 mg PRN    docusate sodium capsule 100 mg BID    furosemide (LASIX) 500 mg in 50 mL infusion (conc: 10 mg/mL) Continuous    glucagon (human recombinant) injection 1 mg PRN    heparin infusion 1,000 units/500 ml in 0.9% NaCl (on sterile field) PRN    hydrALAZINE tablet 75 mg TID    insulin aspart U-100 pen 0-5 Units QID (AC + HS) PRN    insulin aspart U-100 pen 3 Units TIDWM    insulin detemir U-100 (Levemir) pen 8 Units QHS    isosorbide dinitrate tablet 40 mg TID    LIDOcaine HCL 20 mg/ml (2%) injection PRN    melatonin tablet 6 mg Nightly PRN    metOLazone tablet 5 mg Daily    midazolam injection PRN    naloxone 0.4 mg/mL injection 0.02 mg PRN    ondansetron disintegrating tablet 8 mg Q8H PRN    pantoprazole EC tablet 40 mg Daily    sertraline tablet 50 mg Daily    sodium chloride 0.9% flush 10 mL Q12H PRN    sodium chloride 0.9% flush 10 mL PRN       Objective:     Vital Signs (Most Recent):  Temp: 98 °F (36.7 °C) (05/12/23 0741)  Pulse: 89 (05/12/23 0741)  Resp: 18 (05/12/23 0741)  BP: (!) 121/49 (05/12/23 0741)  SpO2: 96 % (05/12/23 0741) Vital Signs (24h Range):  Temp:  [97.5  °F (36.4 °C)-98.2 °F (36.8 °C)] 98 °F (36.7 °C)  Pulse:  [86-95] 89  Resp:  [18-19] 18  SpO2:  [92 %-98 %] 96 %  BP: ()/(44-69) 121/49     Weight: 93.9 kg (207 lb 0.9 oz) (05/12/23 0421)  Body mass index is 34.46 kg/m².  Body surface area is 2.08 meters squared.    I/O last 3 completed shifts:  In: 2090.6 [P.O.:1920; I.V.:170.6]  Out: 2800 [Urine:2800]     Physical Exam  Vitals and nursing note reviewed.   Constitutional:       General: She is not in acute distress.     Appearance: Normal appearance. She is obese. She is not ill-appearing or diaphoretic.   HENT:      Head: Normocephalic and atraumatic.      Right Ear: External ear normal.      Left Ear: External ear normal.      Nose: Nose normal.      Mouth/Throat:      Mouth: Mucous membranes are moist.      Pharynx: Oropharynx is clear.   Eyes:      General: No scleral icterus.     Extraocular Movements: Extraocular movements intact.      Conjunctiva/sclera: Conjunctivae normal.      Pupils: Pupils are equal, round, and reactive to light.   Cardiovascular:      Rate and Rhythm: Normal rate and regular rhythm.      Pulses: Normal pulses.      Heart sounds: Normal heart sounds. No murmur heard.     Comments: LIJ trialysis line   Pulmonary:      Effort: Pulmonary effort is normal. No respiratory distress.      Breath sounds: Normal breath sounds. No stridor. No wheezing or rhonchi.   Chest:      Chest wall: No tenderness.   Abdominal:      General: Abdomen is flat. There is no distension.      Palpations: Abdomen is soft.      Tenderness: There is no abdominal tenderness. There is no guarding or rebound.   Musculoskeletal:         General: Swelling present. No tenderness. Normal range of motion.      Cervical back: Normal range of motion and neck supple. No rigidity or tenderness.      Right lower leg: Edema present.      Left lower leg: Edema present.      Comments: Trigger finger R middle finger  BL AKA stump edema +2   Skin:     General: Skin is warm and  dry.      Capillary Refill: Capillary refill takes less than 2 seconds.      Coloration: Skin is not jaundiced.      Findings: No erythema.   Neurological:      General: No focal deficit present.      Mental Status: She is alert and oriented to person, place, and time.      Cranial Nerves: No cranial nerve deficit.      Motor: No weakness.   Psychiatric:         Mood and Affect: Mood normal.         Behavior: Behavior normal.        Significant Labs:  CBC:   Recent Labs   Lab 05/12/23  0600   WBC 5.31   RBC 2.85*   HGB 8.6*   HCT 29.0*      *   MCH 30.2   MCHC 29.7*     CMP:   Recent Labs   Lab 05/12/23  0600   GLU 57*   CALCIUM 7.3*      K 4.1   CO2 25   CL 99   BUN 65*   CREATININE 5.7*

## 2023-05-12 NOTE — ASSESSMENT & PLAN NOTE
History of CAD with retrosternal chest pain. EKG negative to ST changes. Nonspecific T wave inversions.     -Cardiac tele  -EKG prn  -Bidil per Cards recs  -Continue plavix, hold ASA

## 2023-05-13 LAB
ALLENS TEST: ABNORMAL
ANION GAP SERPL CALC-SCNC: 15 MMOL/L (ref 8–16)
ANION GAP SERPL CALC-SCNC: 16 MMOL/L (ref 8–16)
BASOPHILS # BLD AUTO: 0.04 K/UL (ref 0–0.2)
BASOPHILS NFR BLD: 0.8 % (ref 0–1.9)
BUN SERPL-MCNC: 68 MG/DL (ref 6–20)
BUN SERPL-MCNC: 71 MG/DL (ref 6–20)
CALCIUM SERPL-MCNC: 7.1 MG/DL (ref 8.7–10.5)
CALCIUM SERPL-MCNC: 7.3 MG/DL (ref 8.7–10.5)
CHLORIDE SERPL-SCNC: 95 MMOL/L (ref 95–110)
CHLORIDE SERPL-SCNC: 96 MMOL/L (ref 95–110)
CO2 SERPL-SCNC: 24 MMOL/L (ref 23–29)
CO2 SERPL-SCNC: 24 MMOL/L (ref 23–29)
CREAT SERPL-MCNC: 5.9 MG/DL (ref 0.5–1.4)
CREAT SERPL-MCNC: 5.9 MG/DL (ref 0.5–1.4)
DELSYS: ABNORMAL
DIFFERENTIAL METHOD: ABNORMAL
EOSINOPHIL # BLD AUTO: 0.2 K/UL (ref 0–0.5)
EOSINOPHIL NFR BLD: 4.5 % (ref 0–8)
ERYTHROCYTE [DISTWIDTH] IN BLOOD BY AUTOMATED COUNT: 14.4 % (ref 11.5–14.5)
EST. GFR  (NO RACE VARIABLE): 7.9 ML/MIN/1.73 M^2
EST. GFR  (NO RACE VARIABLE): 7.9 ML/MIN/1.73 M^2
FIO2: 27
FLOW: 3
GLUCOSE SERPL-MCNC: 118 MG/DL (ref 70–110)
GLUCOSE SERPL-MCNC: 157 MG/DL (ref 70–110)
HCO3 UR-SCNC: 31.8 MMOL/L (ref 24–28)
HCT VFR BLD AUTO: 27.9 % (ref 37–48.5)
HGB BLD-MCNC: 8.3 G/DL (ref 12–16)
IMM GRANULOCYTES # BLD AUTO: 0.01 K/UL (ref 0–0.04)
IMM GRANULOCYTES NFR BLD AUTO: 0.2 % (ref 0–0.5)
LACTATE SERPL-SCNC: 0.6 MMOL/L (ref 0.5–2.2)
LYMPHOCYTES # BLD AUTO: 0.8 K/UL (ref 1–4.8)
LYMPHOCYTES NFR BLD: 17.2 % (ref 18–48)
MAGNESIUM SERPL-MCNC: 1.8 MG/DL (ref 1.6–2.6)
MCH RBC QN AUTO: 30.4 PG (ref 27–31)
MCHC RBC AUTO-ENTMCNC: 29.7 G/DL (ref 32–36)
MCV RBC AUTO: 102 FL (ref 82–98)
MODE: ABNORMAL
MONOCYTES # BLD AUTO: 0.7 K/UL (ref 0.3–1)
MONOCYTES NFR BLD: 13.7 % (ref 4–15)
NEUTROPHILS # BLD AUTO: 3.1 K/UL (ref 1.8–7.7)
NEUTROPHILS NFR BLD: 63.6 % (ref 38–73)
NRBC BLD-RTO: 0 /100 WBC
PCO2 BLDA: 50.4 MMHG (ref 35–45)
PH SMN: 7.41 [PH] (ref 7.35–7.45)
PHOSPHATE SERPL-MCNC: 6.7 MG/DL (ref 2.7–4.5)
PLATELET # BLD AUTO: 321 K/UL (ref 150–450)
PMV BLD AUTO: 10 FL (ref 9.2–12.9)
PO2 BLDA: 56 MMHG (ref 40–60)
POC BE: 7 MMOL/L
POC SATURATED O2: 88 % (ref 95–100)
POC TCO2: 33 MMOL/L (ref 24–29)
POCT GLUCOSE: 116 MG/DL (ref 70–110)
POCT GLUCOSE: 130 MG/DL (ref 70–110)
POCT GLUCOSE: 205 MG/DL (ref 70–110)
POTASSIUM SERPL-SCNC: 4.1 MMOL/L (ref 3.5–5.1)
POTASSIUM SERPL-SCNC: 4.2 MMOL/L (ref 3.5–5.1)
RBC # BLD AUTO: 2.73 M/UL (ref 4–5.4)
SAMPLE: ABNORMAL
SITE: ABNORMAL
SODIUM SERPL-SCNC: 134 MMOL/L (ref 136–145)
SODIUM SERPL-SCNC: 136 MMOL/L (ref 136–145)
TROPONIN I SERPL DL<=0.01 NG/ML-MCNC: 0.04 NG/ML (ref 0–0.03)
WBC # BLD AUTO: 4.89 K/UL (ref 3.9–12.7)

## 2023-05-13 PROCEDURE — 85025 COMPLETE CBC W/AUTO DIFF WBC: CPT | Performed by: HOSPITALIST

## 2023-05-13 PROCEDURE — 25000003 PHARM REV CODE 250

## 2023-05-13 PROCEDURE — 93005 ELECTROCARDIOGRAM TRACING: CPT

## 2023-05-13 PROCEDURE — 36415 COLL VENOUS BLD VENIPUNCTURE: CPT | Performed by: INTERNAL MEDICINE

## 2023-05-13 PROCEDURE — 93010 EKG 12-LEAD: ICD-10-PCS | Mod: ,,, | Performed by: INTERNAL MEDICINE

## 2023-05-13 PROCEDURE — 36415 COLL VENOUS BLD VENIPUNCTURE: CPT | Performed by: STUDENT IN AN ORGANIZED HEALTH CARE EDUCATION/TRAINING PROGRAM

## 2023-05-13 PROCEDURE — 99233 SBSQ HOSP IP/OBS HIGH 50: CPT | Mod: GC,,, | Performed by: INTERNAL MEDICINE

## 2023-05-13 PROCEDURE — 25000003 PHARM REV CODE 250: Performed by: STUDENT IN AN ORGANIZED HEALTH CARE EDUCATION/TRAINING PROGRAM

## 2023-05-13 PROCEDURE — 99232 PR SUBSEQUENT HOSPITAL CARE,LEVL II: ICD-10-PCS | Mod: 95,,, | Performed by: STUDENT IN AN ORGANIZED HEALTH CARE EDUCATION/TRAINING PROGRAM

## 2023-05-13 PROCEDURE — 83605 ASSAY OF LACTIC ACID: CPT | Performed by: STUDENT IN AN ORGANIZED HEALTH CARE EDUCATION/TRAINING PROGRAM

## 2023-05-13 PROCEDURE — 84484 ASSAY OF TROPONIN QUANT: CPT | Mod: 91 | Performed by: STUDENT IN AN ORGANIZED HEALTH CARE EDUCATION/TRAINING PROGRAM

## 2023-05-13 PROCEDURE — 63600175 PHARM REV CODE 636 W HCPCS: Performed by: STUDENT IN AN ORGANIZED HEALTH CARE EDUCATION/TRAINING PROGRAM

## 2023-05-13 PROCEDURE — 94799 UNLISTED PULMONARY SVC/PX: CPT

## 2023-05-13 PROCEDURE — 82803 BLOOD GASES ANY COMBINATION: CPT

## 2023-05-13 PROCEDURE — 25000003 PHARM REV CODE 250: Performed by: HOSPITALIST

## 2023-05-13 PROCEDURE — 83735 ASSAY OF MAGNESIUM: CPT | Performed by: INTERNAL MEDICINE

## 2023-05-13 PROCEDURE — 80048 BASIC METABOLIC PNL TOTAL CA: CPT | Performed by: STUDENT IN AN ORGANIZED HEALTH CARE EDUCATION/TRAINING PROGRAM

## 2023-05-13 PROCEDURE — 84100 ASSAY OF PHOSPHORUS: CPT | Performed by: STUDENT IN AN ORGANIZED HEALTH CARE EDUCATION/TRAINING PROGRAM

## 2023-05-13 PROCEDURE — 93010 ELECTROCARDIOGRAM REPORT: CPT | Mod: ,,, | Performed by: INTERNAL MEDICINE

## 2023-05-13 PROCEDURE — 99233 PR SUBSEQUENT HOSPITAL CARE,LEVL III: ICD-10-PCS | Mod: GC,,, | Performed by: INTERNAL MEDICINE

## 2023-05-13 PROCEDURE — 99232 SBSQ HOSP IP/OBS MODERATE 35: CPT | Mod: 95,,, | Performed by: STUDENT IN AN ORGANIZED HEALTH CARE EDUCATION/TRAINING PROGRAM

## 2023-05-13 PROCEDURE — 20600001 HC STEP DOWN PRIVATE ROOM

## 2023-05-13 RX ORDER — NITROGLYCERIN 0.4 MG/1
0.4 TABLET SUBLINGUAL EVERY 5 MIN PRN
Status: COMPLETED | OUTPATIENT
Start: 2023-05-12 | End: 2023-05-12

## 2023-05-13 RX ORDER — LANOLIN ALCOHOL/MO/W.PET/CERES
400 CREAM (GRAM) TOPICAL ONCE
Status: COMPLETED | OUTPATIENT
Start: 2023-05-13 | End: 2023-05-13

## 2023-05-13 RX ORDER — LOSARTAN POTASSIUM 25 MG/1
25 TABLET ORAL DAILY
Status: DISCONTINUED | OUTPATIENT
Start: 2023-05-13 | End: 2023-05-13

## 2023-05-13 RX ORDER — CARVEDILOL 6.25 MG/1
6.25 TABLET ORAL 2 TIMES DAILY
Status: DISCONTINUED | OUTPATIENT
Start: 2023-05-13 | End: 2023-05-29 | Stop reason: HOSPADM

## 2023-05-13 RX ADMIN — CLOPIDOGREL BISULFATE 75 MG: 75 TABLET ORAL at 09:05

## 2023-05-13 RX ADMIN — ACETAMINOPHEN 1000 MG: 500 TABLET ORAL at 12:05

## 2023-05-13 RX ADMIN — APIXABAN 5 MG: 5 TABLET, FILM COATED ORAL at 09:05

## 2023-05-13 RX ADMIN — ACETAZOLAMIDE 500 MG: 250 TABLET ORAL at 02:05

## 2023-05-13 RX ADMIN — CARVEDILOL 6.25 MG: 6.25 TABLET, FILM COATED ORAL at 10:05

## 2023-05-13 RX ADMIN — Medication 6 MG: at 10:05

## 2023-05-13 RX ADMIN — FUROSEMIDE 240 MG: 10 INJECTION, SOLUTION INTRAMUSCULAR; INTRAVENOUS at 11:05

## 2023-05-13 RX ADMIN — AMITRIPTYLINE HYDROCHLORIDE 50 MG: 25 TABLET, FILM COATED ORAL at 12:05

## 2023-05-13 RX ADMIN — HYDRALAZINE HYDROCHLORIDE 75 MG: 50 TABLET ORAL at 09:05

## 2023-05-13 RX ADMIN — FUROSEMIDE 240 MG: 10 INJECTION, SOLUTION INTRAMUSCULAR; INTRAVENOUS at 12:05

## 2023-05-13 RX ADMIN — APIXABAN 5 MG: 5 TABLET, FILM COATED ORAL at 10:05

## 2023-05-13 RX ADMIN — ISOSORBIDE DINITRATE 40 MG: 20 TABLET ORAL at 12:05

## 2023-05-13 RX ADMIN — HYDRALAZINE HYDROCHLORIDE 75 MG: 50 TABLET ORAL at 02:05

## 2023-05-13 RX ADMIN — METOLAZONE 10 MG: 5 TABLET ORAL at 09:05

## 2023-05-13 RX ADMIN — ATORVASTATIN CALCIUM 80 MG: 40 TABLET, FILM COATED ORAL at 09:05

## 2023-05-13 RX ADMIN — ISOSORBIDE DINITRATE 40 MG: 20 TABLET ORAL at 09:05

## 2023-05-13 RX ADMIN — SERTRALINE HYDROCHLORIDE 50 MG: 50 TABLET ORAL at 09:05

## 2023-05-13 RX ADMIN — ALLOPURINOL 100 MG: 100 TABLET ORAL at 09:05

## 2023-05-13 RX ADMIN — PANTOPRAZOLE SODIUM 40 MG: 40 TABLET, DELAYED RELEASE ORAL at 09:05

## 2023-05-13 RX ADMIN — AMITRIPTYLINE HYDROCHLORIDE 50 MG: 25 TABLET, FILM COATED ORAL at 10:05

## 2023-05-13 RX ADMIN — ACETAMINOPHEN 1000 MG: 500 TABLET ORAL at 10:05

## 2023-05-13 RX ADMIN — INSULIN DETEMIR 8 UNITS: 100 INJECTION, SOLUTION SUBCUTANEOUS at 12:05

## 2023-05-13 RX ADMIN — INSULIN DETEMIR 8 UNITS: 100 INJECTION, SOLUTION SUBCUTANEOUS at 11:05

## 2023-05-13 RX ADMIN — FUROSEMIDE 240 MG: 10 INJECTION, SOLUTION INTRAMUSCULAR; INTRAVENOUS at 06:05

## 2023-05-13 RX ADMIN — HYDRALAZINE HYDROCHLORIDE 75 MG: 50 TABLET ORAL at 12:05

## 2023-05-13 RX ADMIN — ISOSORBIDE DINITRATE 40 MG: 20 TABLET ORAL at 10:05

## 2023-05-13 RX ADMIN — Medication 400 MG: at 03:05

## 2023-05-13 RX ADMIN — APIXABAN 5 MG: 5 TABLET, FILM COATED ORAL at 12:05

## 2023-05-13 RX ADMIN — INSULIN ASPART 2 UNITS: 100 INJECTION, SOLUTION INTRAVENOUS; SUBCUTANEOUS at 12:05

## 2023-05-13 RX ADMIN — ACETAZOLAMIDE 500 MG: 250 TABLET ORAL at 11:05

## 2023-05-13 RX ADMIN — DOCUSATE SODIUM 100 MG: 100 CAPSULE, LIQUID FILLED ORAL at 09:05

## 2023-05-13 RX ADMIN — ISOSORBIDE DINITRATE 40 MG: 20 TABLET ORAL at 02:05

## 2023-05-13 RX ADMIN — HYDRALAZINE HYDROCHLORIDE 75 MG: 50 TABLET ORAL at 10:05

## 2023-05-13 NOTE — PLAN OF CARE
Chest pain, likely from  initiation  Other causes to be ruled out    Sudden severe chest pain, not radiating to the back  A/w dyspnea  SL NG x 1 with no relief  No tenderness  H/o isch CMP  STAT EKG with TWIs but they aren't new    CXR, blood work ordered  Told RN to do BP/HR in both arms  PE is a differential, can do CT PE but GFR is very low! Can consider anticoag directly if high suspicion for PE and stopping  isn't helping    D/w staff

## 2023-05-13 NOTE — ASSESSMENT & PLAN NOTE
57 y.o F w/ hx of CAD s/p CABG LIMA-LAD 01/2020 & PCI  DESx1 to MidCx), T2DM, diastolic and systolic heart failure(EF15%), PAD s/p RSFA and pop intervention 10/2021, then bilateral AKA, PAF, HTN, HLD here with volume overload in the setting of medication nonadherence.    TTE this admission showed EF of 15% (previously 55%), biatrial enlargement, CVP 15, PASP 43, severe tricuspid regurgitation, not noted on prior in 2021  .2, HgA1c 6.2  RHC on 5/10: RA: 15 RV: 50/ 18 PA: 50/ 25/ 35 PWP: 30.    Patient developed chest pain after starting . On review of her prior LHC, she has  of the RCA with collateral supply from the LCx. She may have developed angina due to increased HR from  and increased demand.     Recommendations:  - Given episode of chest pain - uptitrate anti-anginals  - Start beta blockade again  - Check 1 more troponin level  - Continue max dose Bidil hydralazine 75 mg + isosorbide dinitrate 40mg TID  - Would not attempt using  again  -  at 2.5mcg/kg/min would not work for renal perfusion  - Patient will likely need dialysis for further volume removal, for which Nephrology are following   - Continue aggressive diuresis  - Strict I/Os  - 1.5L fluid restriction, low sodium diet    If patient starts dialysis or when renal function improves:  - PET stress testing in the outpatient setting for ischemic workup  - Optimize GDMT: start ACEi/ARB, uptitrate beta blockade with Toprol-XL or carvedilol +/-  spironolactone and SGLT2i depending on renal function

## 2023-05-13 NOTE — NURSING
Dr. Morse paged and presented to room. STAT labs ordered s/p examination. Ordered  to give another SL njtg @[id number]

## 2023-05-13 NOTE — ASSESSMENT & PLAN NOTE
- Likely cardiorenal, previously she has required HD  - Hoping to avoid need for HD with ongoing diuresis  - Nephrology following

## 2023-05-13 NOTE — PROGRESS NOTES
[facility] - Cardiology [facility] Medicine  Progress Note    Patient Name: Suyapa Connelly  MRN: [id number]  Patient Class: IP- Inpatient   Admission Date: 5/1/2023  Length of Stay: 10 days  Attending Physician: Ruma Schulte MD  Primary Care Provider: Magen Christensen MD        Subjective:     Principal Problem:Acute on chronic combined systolic and diastolic heart failure        HPI:  57 y.o. female with past medical history of CAD s/p CABG, CKD, DM, HTN, HLD, s/p bilateral AKA who presnets with chest pain, difficulty breathing, leg swelling.     Per ED She has been unable to access any of her medications for the last 3 months. Over the last few months she has noted worsening shortness of breath, particulatly orthopnea, and leg swelling. In the last two weeks she endorses associated abdominal pain and swelling, reduced appetite, and pleuritic chest pain with deep inspiration. She endorses occasional lighthededness with changes in position. Last week she had an episode where she was unable to breathe for about two minutes which was very upsetting for her and her , but she recovered and did not seek care at that time. Today, she complains of significant difficulty breathing, swelling in her legs and abdomen, and reduced appetite.     No headaches, visual changes, URI symptoms, palpitations, nausea, vomiting, diarrhea, blood in her stool, dysuria, hematuria, numbness or weakness in her extremities. She does endorse occasional paresthesias associated with her chronic phantom limb pain. She additionally has had increased difficulties with depression recently, denies suicidal or homicidal ideation, but has had considerable stress and low mood.      Of note, the patient saw a new primary care provider 4/21 for similar concerns who recommended she proceed to the ED at that time, but they were unable due to financial and geographic concerns. She lives two hours away from [facility] and her  cares for her  and elderly mother with dementia. She has had decreased access to medical care over the last year, and unable to access most of her prescriptions. She had a 90 day supply of four medications (lasix, statin, clopidogrel, sertraline) but these were accidentally lost and she has been unable to afford a replacement. They present today for evaluation and to reestablish care and access to her prescriptions.     Labs on admission significant for an elevated BNP, hypocalcemia which corrected is 8.3, significant elevation in Creatinine and a slight elevation of troponin    CXR  Cardiomegaly with pulmonary interstitial edema, suggestive of pulmonary edema secondary to CHF.      Overview/Hospital Course:  Diuresis initiated with Lasix IV.  1.2L UOP in the past 24 hours.  Vitals reassuring with adequate sats on ambient air.  Still very edematious; Cardiology recs lasix gtt.  GDMT initiated. Pt given 2mg morphine for moderate to severe pain in legs from swelling. Diuresis increased to 40mg/hr due to inadequate urine output. Cr continued to increase. Cr stable at new Lasix rate. Diuresed 1.9L with some improvement in clinical status. S/p RHC that showed wedge of 26-32 with CVP of 15. Will continue diuresis with Lasix 30/hr with the addition of metolazone.       Interval History: chest pain overnight, resting comfortably on exam    Review of Systems   Constitutional:  Negative for chills, fatigue and fever.   HENT:  Negative for congestion, dental problem, facial swelling, postnasal drip, rhinorrhea, sore throat and trouble swallowing.    Eyes:  Negative for photophobia and pain.   Respiratory:  Negative for cough, choking, chest tightness, shortness of breath and wheezing.    Cardiovascular:  Positive for leg swelling. Negative for chest pain.   Gastrointestinal:  Negative for abdominal distention, abdominal pain, diarrhea, nausea and vomiting.   Endocrine: Negative.    Genitourinary:  Negative for dysuria, flank pain, frequency  and hematuria.   Musculoskeletal:  Negative for back pain.   Skin:  Negative for pallor and rash.   Allergic/Immunologic: Negative.    Neurological:  Negative for dizziness, syncope, weakness, numbness and headaches.   Psychiatric/Behavioral:  The patient is not nervous/anxious.    Objective:     Vital Signs (Most Recent):  Temp: 98.4 °F (36.9 °C) (05/13/23 0742)  Pulse: 87 (05/13/23 0742)  Resp: 18 (05/13/23 0742)  BP: 130/60 (05/13/23 0742)  SpO2: 98 % (05/13/23 0742) Vital Signs (24h Range):  Temp:  [97.7 °F (36.5 °C)-98.5 °F (36.9 °C)] 98.4 °F (36.9 °C)  Pulse:  [18-95] 87  Resp:  [18] 18  SpO2:  [92 %-100 %] 98 %  BP: (104-131)/(46-70) 130/60     Weight: 92.9 kg (204 lb 12.9 oz)  Body mass index is 34.08 kg/m².    Intake/Output Summary (Last 24 hours) at 5/13/2023 0811  Last data filed at 5/13/2023 0503  Gross per 24 hour   Intake 523.65 ml   Output 2750 ml   Net -2226.35 ml         Physical Exam  Vitals and nursing note reviewed.   Constitutional:       General: She is not in acute distress.     Appearance: Normal appearance. She is obese. She is not ill-appearing or diaphoretic.   HENT:      Head: Normocephalic and atraumatic.      Right Ear: External ear normal.      Left Ear: External ear normal.      Nose: Nose normal.      Mouth/Throat:      Mouth: Mucous membranes are moist.      Pharynx: Oropharynx is clear.   Eyes:      General: No scleral icterus.     Extraocular Movements: Extraocular movements intact.      Conjunctiva/sclera: Conjunctivae normal.      Pupils: Pupils are equal, round, and reactive to light.   Cardiovascular:      Rate and Rhythm: Normal rate and regular rhythm.      Pulses: Normal pulses.      Heart sounds: Normal heart sounds. No murmur heard.  Pulmonary:      Effort: Pulmonary effort is normal. No respiratory distress.      Breath sounds: No stridor. No wheezing or rhonchi.   Chest:      Chest wall: No tenderness.   Abdominal:      General: Abdomen is flat. There is no distension.       Palpations: Abdomen is soft.      Tenderness: There is no abdominal tenderness. There is no guarding or rebound.   Musculoskeletal:         General: Swelling present. No tenderness. Normal range of motion.      Cervical back: Normal range of motion and neck supple. No rigidity or tenderness.      Right lower leg: Edema present.      Left lower leg: Edema present.      Comments: Trigger finger R middle finger   Skin:     General: Skin is warm and dry.      Capillary Refill: Capillary refill takes less than 2 seconds.      Coloration: Skin is not jaundiced.      Findings: No erythema.   Neurological:      General: No focal deficit present.      Mental Status: She is alert and oriented to person, place, and time.      Cranial Nerves: No cranial nerve deficit.      Motor: No weakness.   Psychiatric:         Mood and Affect: Mood normal.         Behavior: Behavior normal.           Significant Labs: All pertinent labs within the past 24 hours have been reviewed.    Significant Imaging: I have reviewed all pertinent imaging results/findings within the past 24 hours.      Assessment/Plan:      * Acute on chronic combined systolic and diastolic heart failure  Last Echo 6/21 showing     The estimated ejection fraction is 55%.   The left ventricle is normal in size with normal systolic function.   Normal left ventricular diastolic function.   Normal right ventricular size with normal right ventricular systolic function.   Mild tricuspid regurgitation.   The estimated PA systolic pressure is 31 mmHg.   Normal central venous pressure (3 mmHg).    Echo    Result Date: 5/2/2023  · The left ventricle is mildly enlarged with severely decreased systolic   function. The estimated ejection fraction is 15%.  · There is severe left ventricular global hypokinesis.  · Normal right ventricular size with low normal right ventricular systolic   function.  · Grade I left ventricular diastolic dysfunction.  · Biatrial  enlargement.  · Mild-to-moderate mitral regurgitation.  · Severe tricuspid regurgitation.  · The estimated PA systolic pressure is 43 mmHg.  · Elevated central venous pressure (15 mmHg).         RHC  CVP 15  Wedge 25-32    Patient did not tolerate dobutamine, had chest pain    -Now on lasix pushes per nephro  -Antihypertensive. GDMT, Anticoagulation, and diuresis management per Nephrology and Cardiology in a multidisciplinary team  -Qq4 BMPs with electrolyte repletion as needed; goal K>4, Mg>2  - 1.5L fluid restriction, low Na diet  - continue GDMT when possible  -Tylenol 1g up to 3 times a day          Myalgia  Will continue to treat pain as needed      ROSLYN (acute kidney injury)  Pt with increased Cr to 4.7 from 2.0 baseline. Possibly due to renovascular congestion from volume overload.     -Kerns  -Strict Is/Os  -Avoid nephrotoxic agents  -Daily CMPs        Acute hypoxemic respiratory failure  Resolved    CKD (chronic kidney disease), stage IV  History of CKD. See ROSLYN.      Above-knee amputation of right lower extremity  Prior history consistent with exam      Uses self-applied continuous glucose monitoring device  POCT glucose and daily CMPs    -Restart insulin for hyperglycemia      Dermatitis associated with moisture  Large body habitus with dermatitis to folds. Will monitor for infection.    -Zinc based powder  -Topical abx if indicated      Status post above knee amputation, left  History of.      Hypoalbuminemia  Pt with poor PO intake reported. Pt eats one meal a day and has associated nausea and vomiting. Will try antiemetics before feeds to improve intake. Will monitor daily intake and calorie replacement.     -Calorie count  -Diabetic/Renal diet  -Antiemetics before meals; Zofran 4 IV PRN  -Monitor Qtc for prolongation with antiemetics      Impaired functional mobility and endurance  PT/OT for bedbound patient      Paroxysmal atrial fibrillation  Continue home rate control  NSR on EKG  Eliquis 5mg  BID          Anemia  Asymptomatic. Higher than 8 months ago. No signs of acute bleed at this time. Haptoglobin elevated.    -CBC daily  -Transfuse if <7        S/P CABG x 1  History of. Sternal scar well healed.       CAD (coronary artery disease)  History of CAD with retrosternal chest pain. EKG negative to ST changes. Nonspecific T wave inversions.     -Cardiac tele  -EKG prn  -Bidil per Cards recs  -Continue plavix, hold ASA    CAROLIN (generalized anxiety disorder)  Continue home anxiety medications      Essential hypertension  Continue home antihypertensives      PAD (peripheral artery disease)  History of bilateral AKA.           VTE Risk Mitigation (From admission, onward)         Ordered     apixaban tablet 5 mg  2 times daily         05/11/23 1329     IP VTE HIGH RISK PATIENT  Once         05/07/23 1547                Discharge Planning   DEVAN: 5/15/2023     Code Status: Full Code   Is the patient medically ready for discharge?: No    Reason for patient still in hospital (select all that apply): Treatment  Discharge Plan A: Home with family, Home Health   Discharge Delays: None known at this time              Chandrakant Oakley MD  Department of Hospital Medicine   [facility] - Cardiology Stepdown

## 2023-05-13 NOTE — SUBJECTIVE & OBJECTIVE
Interval History: chest pain overnight, resting comfortably on exam    Review of Systems   Constitutional:  Negative for chills, fatigue and fever.   HENT:  Negative for congestion, dental problem, facial swelling, postnasal drip, rhinorrhea, sore throat and trouble swallowing.    Eyes:  Negative for photophobia and pain.   Respiratory:  Negative for cough, choking, chest tightness, shortness of breath and wheezing.    Cardiovascular:  Positive for leg swelling. Negative for chest pain.   Gastrointestinal:  Negative for abdominal distention, abdominal pain, diarrhea, nausea and vomiting.   Endocrine: Negative.    Genitourinary:  Negative for dysuria, flank pain, frequency and hematuria.   Musculoskeletal:  Negative for back pain.   Skin:  Negative for pallor and rash.   Allergic/Immunologic: Negative.    Neurological:  Negative for dizziness, syncope, weakness, numbness and headaches.   Psychiatric/Behavioral:  The patient is not nervous/anxious.    Objective:     Vital Signs (Most Recent):  Temp: 98.4 °F (36.9 °C) (05/13/23 0742)  Pulse: 87 (05/13/23 0742)  Resp: 18 (05/13/23 0742)  BP: 130/60 (05/13/23 0742)  SpO2: 98 % (05/13/23 0742) Vital Signs (24h Range):  Temp:  [97.7 °F (36.5 °C)-98.5 °F (36.9 °C)] 98.4 °F (36.9 °C)  Pulse:  [18-95] 87  Resp:  [18] 18  SpO2:  [92 %-100 %] 98 %  BP: (104-131)/(46-70) 130/60     Weight: 92.9 kg (204 lb 12.9 oz)  Body mass index is 34.08 kg/m².    Intake/Output Summary (Last 24 hours) at 5/13/2023 0811  Last data filed at 5/13/2023 0503  Gross per 24 hour   Intake 523.65 ml   Output 2750 ml   Net -2226.35 ml         Physical Exam  Vitals and nursing note reviewed.   Constitutional:       General: She is not in acute distress.     Appearance: Normal appearance. She is obese. She is not ill-appearing or diaphoretic.   HENT:      Head: Normocephalic and atraumatic.      Right Ear: External ear normal.      Left Ear: External ear normal.      Nose: Nose normal.      Mouth/Throat:       Mouth: Mucous membranes are moist.      Pharynx: Oropharynx is clear.   Eyes:      General: No scleral icterus.     Extraocular Movements: Extraocular movements intact.      Conjunctiva/sclera: Conjunctivae normal.      Pupils: Pupils are equal, round, and reactive to light.   Cardiovascular:      Rate and Rhythm: Normal rate and regular rhythm.      Pulses: Normal pulses.      Heart sounds: Normal heart sounds. No murmur heard.  Pulmonary:      Effort: Pulmonary effort is normal. No respiratory distress.      Breath sounds: No stridor. No wheezing or rhonchi.   Chest:      Chest wall: No tenderness.   Abdominal:      General: Abdomen is flat. There is no distension.      Palpations: Abdomen is soft.      Tenderness: There is no abdominal tenderness. There is no guarding or rebound.   Musculoskeletal:         General: Swelling present. No tenderness. Normal range of motion.      Cervical back: Normal range of motion and neck supple. No rigidity or tenderness.      Right lower leg: Edema present.      Left lower leg: Edema present.      Comments: Trigger finger R middle finger   Skin:     General: Skin is warm and dry.      Capillary Refill: Capillary refill takes less than 2 seconds.      Coloration: Skin is not jaundiced.      Findings: No erythema.   Neurological:      General: No focal deficit present.      Mental Status: She is alert and oriented to person, place, and time.      Cranial Nerves: No cranial nerve deficit.      Motor: No weakness.   Psychiatric:         Mood and Affect: Mood normal.         Behavior: Behavior normal.           Significant Labs: All pertinent labs within the past 24 hours have been reviewed.    Significant Imaging: I have reviewed all pertinent imaging results/findings within the past 24 hours.

## 2023-05-13 NOTE — SUBJECTIVE & OBJECTIVE
Interval History: Patient was started on dobutamine yesterday 4pm however, she soon developed 8/10, pressure like chest pain around 8pm, that was not fully relieved by 2 doses of sublingual NTG. After a few hours her pain subsided, then returned to 3/10.      Cardiology fellow on call saw the patient and decision made to pause  which was going at 5/hr.   EKG checked and did not show any new ischemic changes.  Trop checked and was 0.04, same as a few days ago.  Lactic acid was 0.7.    This morning patient did not report any chest discomfort, and she and her  are willing to try  again though at a lower dose.    Review of Systems   Cardiovascular:  Positive for dyspnea on exertion, leg swelling and orthopnea. Negative for chest pain.   Respiratory:  Positive for shortness of breath. Negative for cough and sputum production.    Gastrointestinal:  Negative for nausea and vomiting.   Genitourinary:  Negative for dysuria and frequency.   Objective:     Vital Signs (Most Recent):  Temp: 98.4 °F (36.9 °C) (05/13/23 0742)  Pulse: 87 (05/13/23 0742)  Resp: 18 (05/13/23 0742)  BP: 130/60 (05/13/23 0742)  SpO2: 98 % (05/13/23 0742) Vital Signs (24h Range):  Temp:  [97.7 °F (36.5 °C)-98.5 °F (36.9 °C)] 98.4 °F (36.9 °C)  Pulse:  [18-95] 87  Resp:  [18] 18  SpO2:  [92 %-100 %] 98 %  BP: (104-131)/(46-70) 130/60     Weight: 92.9 kg (204 lb 12.9 oz)  Body mass index is 34.08 kg/m².     SpO2: 98 %         Intake/Output Summary (Last 24 hours) at 5/13/2023 0849  Last data filed at 5/13/2023 0503  Gross per 24 hour   Intake 523.65 ml   Output 2750 ml   Net -2226.35 ml         Lines/Drains/Airways       Drain  Duration             Female External Urinary Catheter 05/03/23 0805 10 days              Peripheral Intravenous Line  Duration                  Peripheral IV - Single Lumen 05/10/23 1724 20 G;1 3/4 in Right Forearm 2 days                       Physical Exam  Vitals and nursing note reviewed.   Constitutional:        General: She is not in acute distress.     Appearance: She is obese. She is not ill-appearing, toxic-appearing or diaphoretic.   HENT:      Head: Normocephalic and atraumatic.      Mouth/Throat:      Mouth: Mucous membranes are moist.   Eyes:      General: No scleral icterus.        Right eye: No discharge.         Left eye: No discharge.   Neck:      Vascular: JVD present.   Cardiovascular:      Rate and Rhythm: Normal rate and regular rhythm.   Pulmonary:      Effort: Pulmonary effort is normal. No respiratory distress.      Breath sounds: No wheezing.   Abdominal:      General: Bowel sounds are normal. There is no distension.      Palpations: Abdomen is soft.      Tenderness: There is abdominal tenderness. There is no guarding.   Musculoskeletal:      Cervical back: No rigidity.      Comments: Patient with bilateral AKA. Minimal edema of thighs   Skin:     General: Skin is warm.      Coloration: Skin is not jaundiced.   Neurological:      Mental Status: She is alert and oriented to person, place, and time. Mental status is at baseline.   Psychiatric:         Mood and Affect: Mood normal.         Behavior: Behavior normal.          Significant Labs: CMP   Recent Labs   Lab 05/12/23  0600 05/12/23  1618 05/12/23  2117    132* 133*   K 4.1 3.9 4.4   CL 99 93* 96   CO2 25 22* 25   GLU 57* 190* 153*   BUN 65* 62* 68*   CREATININE 5.7* 5.1* 5.5*   CALCIUM 7.3* 7.0* 7.4*   PROT  --   --  6.3   ALBUMIN  --   --  2.2*   BILITOT  --   --  0.4   ALKPHOS  --   --  186*   AST  --   --  32   ALT  --   --  16   ANIONGAP 13 17* 12       Significant Imaging:  Reviewed

## 2023-05-13 NOTE — PROGRESS NOTES
[facility]  Cardiology  Progress Note    Patient Name: Suyapa Connelly  MRN: [id number]  Admission Date: 5/1/2023  Hospital Length of Stay: 10 days  Code Status: Full Code   Attending Physician: Ruma Schulte MD   Primary Care Physician: Magen Christensen MD  Expected Discharge Date: 5/15/2023  Principal Problem:Acute on chronic combined systolic and diastolic heart failure    Subjective:     Interval History: Patient was started on dobutamine yesterday 4pm however, she soon developed 8/10, pressure like chest pain around 8pm, that was not fully relieved by 2 doses of sublingual NTG. After a few hours her pain subsided, then returned to 3/10.      Cardiology fellow on call saw the patient and decision made to pause  which was going at 5/hr.   EKG checked and did not show any new ischemic changes.  Trop checked and was 0.04, same as a few days ago.  Lactic acid was 0.7.    This morning patient reported minimal chest discomfort, saying she is nearly back to baseline. Enquiring about trying  again (See plan).    Review of Systems   Cardiovascular:  Positive for dyspnea on exertion, leg swelling and orthopnea. Negative for chest pain.   Respiratory:  Positive for shortness of breath. Negative for cough and sputum production.    Gastrointestinal:  Negative for nausea and vomiting.   Genitourinary:  Negative for dysuria and frequency.   Objective:     Vital Signs (Most Recent):  Temp: 98.4 °F (36.9 °C) (05/13/23 0742)  Pulse: 87 (05/13/23 0742)  Resp: 18 (05/13/23 0742)  BP: 130/60 (05/13/23 0742)  SpO2: 98 % (05/13/23 0742) Vital Signs (24h Range):  Temp:  [97.7 °F (36.5 °C)-98.5 °F (36.9 °C)] 98.4 °F (36.9 °C)  Pulse:  [18-95] 87  Resp:  [18] 18  SpO2:  [92 %-100 %] 98 %  BP: (104-131)/(46-70) 130/60     Weight: 92.9 kg (204 lb 12.9 oz)  Body mass index is 34.08 kg/m².     SpO2: 98 %         Intake/Output Summary (Last 24 hours) at 5/13/2023 0849  Last data filed at 5/13/2023 0503  Gross per  24 hour   Intake 523.65 ml   Output 2750 ml   Net -2226.35 ml         Lines/Drains/Airways       Drain  Duration             Female External Urinary Catheter 05/03/23 0805 10 days              Peripheral Intravenous Line  Duration                  Peripheral IV - Single Lumen 05/10/23 1724 20 G;1 3/4 in Right Forearm 2 days                       Physical Exam  Vitals and nursing note reviewed.   Constitutional:       General: She is not in acute distress.     Appearance: She is obese. She is not ill-appearing, toxic-appearing or diaphoretic.   HENT:      Head: Normocephalic and atraumatic.      Mouth/Throat:      Mouth: Mucous membranes are moist.   Eyes:      General: No scleral icterus.        Right eye: No discharge.         Left eye: No discharge.   Neck:      Vascular: JVD present.   Cardiovascular:      Rate and Rhythm: Normal rate and regular rhythm.   Pulmonary:      Effort: Pulmonary effort is normal. No respiratory distress.      Breath sounds: No wheezing.   Abdominal:      General: Bowel sounds are normal. There is no distension.      Palpations: Abdomen is soft.      Tenderness: There is abdominal tenderness. There is no guarding.   Musculoskeletal:      Cervical back: No rigidity.      Comments: Patient with bilateral AKA. Minimal edema of thighs   Skin:     General: Skin is warm.      Coloration: Skin is not jaundiced.   Neurological:      Mental Status: She is alert and oriented to person, place, and time. Mental status is at baseline.   Psychiatric:         Mood and Affect: Mood normal.         Behavior: Behavior normal.          Significant Labs: CMP   Recent Labs   Lab 05/12/23  0600 05/12/23  1618 05/12/23  2117    132* 133*   K 4.1 3.9 4.4   CL 99 93* 96   CO2 25 22* 25   GLU 57* 190* 153*   BUN 65* 62* 68*   CREATININE 5.7* 5.1* 5.5*   CALCIUM 7.3* 7.0* 7.4*   PROT  --   --  6.3   ALBUMIN  --   --  2.2*   BILITOT  --   --  0.4   ALKPHOS  --   --  186*   AST  --   --  32   ALT  --   --  16  "  ANIONGAP 13 17* 12       Significant Imaging:  Reviewed    Assessment and Plan:     * Acute on chronic combined systolic and diastolic heart failure  57 y.o F w/ hx of CAD s/p CABG LIMA-LAD 01/2020 & PCI  DESx1 to MidCx), T2DM, diastolic and systolic heart failure(EF15%), PAD s/p RSFA and pop intervention 10/2021, then bilateral AKA, PAF, HTN, HLD here with volume overload in the setting of medication nonadherence.    TTE this admission showed EF of 15% (previously 55%), biatrial enlargement, CVP 15, PASP 43, severe tricuspid regurgitation, not noted on prior in 2021  .2, HgA1c 6.2  RHC on 5/10: RA: 15 RV: 50/ 18 PA: 50/ 25/ 35 PWP: 30.    Patient developed chest pain after starting . On review of her prior LHC, she has  of the RCA with collateral supply from the LCx. She may have developed angina due to increased HR from  and increased demand.     Recommendations:  - Given episode of chest pain - uptitrate anti-anginals  - Restart beta blocker  - Check 1 more troponin level  - Continue max dose Bidil hydralazine 75 mg + isosorbide dinitrate 40mg TID  - Would not attempt using  again  -  at 2.5mcg/kg/min would not work for renal perfusion  - Patient will likely need dialysis for further volume removal, for which Nephrology are following   - Continue aggressive diuresis  - Strict I/Os  - 1.5L fluid restriction, low sodium diet    If patient starts dialysis or when renal function improves:  - PET stress testing in the outpatient setting for ischemic workup  - Optimize GDMT: start ACEi/ARB, uptitrate beta blockade with Toprol-XL or carvedilol +/-  spironolactone and SGLT2i depending on renal function    ROSLYN (acute kidney injury)  - Likely cardiorenal, previously she has required HD  - Nephrology following    Paroxysmal atrial fibrillation  Per chart review from notes in 03/2021, hx of Afib "post op after CABG. Oral amiodarone discontinued and not on NOAC".   CHADSVASC score:5    - Continue " eliquis 5mg BID + clopidogrel  - Restart beta blocker        Thank you for your consult, we will sign off. Please call us for any further questions.      VTE Risk Mitigation (From admission, onward)           Ordered     apixaban tablet 5 mg  2 times daily         05/11/23 1329     IP VTE HIGH RISK PATIENT  Once         05/07/23 1547                    Angi Mchugh MD  Cardiology  [facility]

## 2023-05-13 NOTE — ASSESSMENT & PLAN NOTE
Last Echo 6/21 showing     The estimated ejection fraction is 55%.   The left ventricle is normal in size with normal systolic function.   Normal left ventricular diastolic function.   Normal right ventricular size with normal right ventricular systolic function.   Mild tricuspid regurgitation.   The estimated PA systolic pressure is 31 mmHg.   Normal central venous pressure (3 mmHg).    Echo    Result Date: 5/2/2023  · The left ventricle is mildly enlarged with severely decreased systolic   function. The estimated ejection fraction is 15%.  · There is severe left ventricular global hypokinesis.  · Normal right ventricular size with low normal right ventricular systolic   function.  · Grade I left ventricular diastolic dysfunction.  · Biatrial enlargement.  · Mild-to-moderate mitral regurgitation.  · Severe tricuspid regurgitation.  · The estimated PA systolic pressure is 43 mmHg.  · Elevated central venous pressure (15 mmHg).         RHC  CVP 15  Wedge 25-32    Patient did not tolerate dobutamine, had chest pain    -Now on lasix pushes per nephro  -Antihypertensive. GDMT, Anticoagulation, and diuresis management per Nephrology and Cardiology in a multidisciplinary team  -Qq4 BMPs with electrolyte repletion as needed; goal K>4, Mg>2  - 1.5L fluid restriction, low Na diet  - continue GDMT when possible  -Tylenol 1g up to 3 times a day

## 2023-05-13 NOTE — ASSESSMENT & PLAN NOTE
"Per chart review from notes in 03/2021, hx of Afib "post op after CABG. Oral amiodarone discontinued and not on NOAC".   CHADSVASC score:5    - Continue eliquis 5mg BID + clopidogrel  - Rate control when able to, currently on dobutamine  "

## 2023-05-13 NOTE — NURSING
"This nurse in room to check on patent who appeared to be lethargic and not responding to me the way she was the other day.  Patient c/o SOB but not as bad as usual. I feel over medicated and very tired."  This nurse advised patient he would be back to speak with her and review her medications.  "
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
## 2023-05-13 NOTE — ASSESSMENT & PLAN NOTE
Pt with increased Cr to 4.7 from 2.0 baseline. Possibly due to renovascular congestion from volume overload.     -Kerns  -Strict Is/Os  -Avoid nephrotoxic agents  -Daily CMPs

## 2023-05-13 NOTE — PLAN OF CARE
Pt maintained free from falls/trauma/injuries and skin breakdown. Pt reported decreasae of pain to 4/10. Denies current SOB and is currently sleeping with  at bedside. Plan of care reviewed. Pt verbalized understanding. All questions and concerns addressed. Will continue to monitor.

## 2023-05-14 LAB
ANION GAP SERPL CALC-SCNC: 15 MMOL/L (ref 8–16)
ANION GAP SERPL CALC-SCNC: 15 MMOL/L (ref 8–16)
BASOPHILS # BLD AUTO: 0.04 K/UL (ref 0–0.2)
BASOPHILS NFR BLD: 0.8 % (ref 0–1.9)
BUN SERPL-MCNC: 72 MG/DL (ref 6–20)
BUN SERPL-MCNC: 73 MG/DL (ref 6–20)
CALCIUM SERPL-MCNC: 7.1 MG/DL (ref 8.7–10.5)
CALCIUM SERPL-MCNC: 7.5 MG/DL (ref 8.7–10.5)
CHLORIDE SERPL-SCNC: 94 MMOL/L (ref 95–110)
CHLORIDE SERPL-SCNC: 95 MMOL/L (ref 95–110)
CO2 SERPL-SCNC: 26 MMOL/L (ref 23–29)
CO2 SERPL-SCNC: 27 MMOL/L (ref 23–29)
CREAT SERPL-MCNC: 5.9 MG/DL (ref 0.5–1.4)
CREAT SERPL-MCNC: 6.3 MG/DL (ref 0.5–1.4)
DIFFERENTIAL METHOD: ABNORMAL
EOSINOPHIL # BLD AUTO: 0.3 K/UL (ref 0–0.5)
EOSINOPHIL NFR BLD: 4.8 % (ref 0–8)
ERYTHROCYTE [DISTWIDTH] IN BLOOD BY AUTOMATED COUNT: 14.1 % (ref 11.5–14.5)
EST. GFR  (NO RACE VARIABLE): 7.3 ML/MIN/1.73 M^2
EST. GFR  (NO RACE VARIABLE): 7.9 ML/MIN/1.73 M^2
GLUCOSE SERPL-MCNC: 130 MG/DL (ref 70–110)
GLUCOSE SERPL-MCNC: 85 MG/DL (ref 70–110)
HCT VFR BLD AUTO: 30.1 % (ref 37–48.5)
HGB BLD-MCNC: 8.8 G/DL (ref 12–16)
IMM GRANULOCYTES # BLD AUTO: 0.01 K/UL (ref 0–0.04)
IMM GRANULOCYTES NFR BLD AUTO: 0.2 % (ref 0–0.5)
LYMPHOCYTES # BLD AUTO: 1 K/UL (ref 1–4.8)
LYMPHOCYTES NFR BLD: 19.8 % (ref 18–48)
MAGNESIUM SERPL-MCNC: 1.8 MG/DL (ref 1.6–2.6)
MCH RBC QN AUTO: 29.8 PG (ref 27–31)
MCHC RBC AUTO-ENTMCNC: 29.2 G/DL (ref 32–36)
MCV RBC AUTO: 102 FL (ref 82–98)
MONOCYTES # BLD AUTO: 0.5 K/UL (ref 0.3–1)
MONOCYTES NFR BLD: 10.3 % (ref 4–15)
NEUTROPHILS # BLD AUTO: 3.4 K/UL (ref 1.8–7.7)
NEUTROPHILS NFR BLD: 64.1 % (ref 38–73)
NRBC BLD-RTO: 0 /100 WBC
PLATELET # BLD AUTO: 346 K/UL (ref 150–450)
PMV BLD AUTO: 10.2 FL (ref 9.2–12.9)
POCT GLUCOSE: 140 MG/DL (ref 70–110)
POCT GLUCOSE: 78 MG/DL (ref 70–110)
POCT GLUCOSE: 89 MG/DL (ref 70–110)
POTASSIUM SERPL-SCNC: 3.7 MMOL/L (ref 3.5–5.1)
POTASSIUM SERPL-SCNC: 4.1 MMOL/L (ref 3.5–5.1)
RBC # BLD AUTO: 2.95 M/UL (ref 4–5.4)
SODIUM SERPL-SCNC: 136 MMOL/L (ref 136–145)
SODIUM SERPL-SCNC: 136 MMOL/L (ref 136–145)
WBC # BLD AUTO: 5.24 K/UL (ref 3.9–12.7)

## 2023-05-14 PROCEDURE — 99232 SBSQ HOSP IP/OBS MODERATE 35: CPT | Mod: GC,,, | Performed by: INTERNAL MEDICINE

## 2023-05-14 PROCEDURE — 83735 ASSAY OF MAGNESIUM: CPT | Performed by: INTERNAL MEDICINE

## 2023-05-14 PROCEDURE — 99232 PR SUBSEQUENT HOSPITAL CARE,LEVL II: ICD-10-PCS | Mod: GC,,, | Performed by: INTERNAL MEDICINE

## 2023-05-14 PROCEDURE — 36415 COLL VENOUS BLD VENIPUNCTURE: CPT | Performed by: STUDENT IN AN ORGANIZED HEALTH CARE EDUCATION/TRAINING PROGRAM

## 2023-05-14 PROCEDURE — 63600175 PHARM REV CODE 636 W HCPCS: Performed by: STUDENT IN AN ORGANIZED HEALTH CARE EDUCATION/TRAINING PROGRAM

## 2023-05-14 PROCEDURE — 20600001 HC STEP DOWN PRIVATE ROOM

## 2023-05-14 PROCEDURE — 80048 BASIC METABOLIC PNL TOTAL CA: CPT | Mod: 91 | Performed by: STUDENT IN AN ORGANIZED HEALTH CARE EDUCATION/TRAINING PROGRAM

## 2023-05-14 PROCEDURE — 25000003 PHARM REV CODE 250

## 2023-05-14 PROCEDURE — 85025 COMPLETE CBC W/AUTO DIFF WBC: CPT | Performed by: HOSPITALIST

## 2023-05-14 PROCEDURE — 36415 COLL VENOUS BLD VENIPUNCTURE: CPT | Performed by: INTERNAL MEDICINE

## 2023-05-14 PROCEDURE — 25000003 PHARM REV CODE 250: Performed by: HOSPITALIST

## 2023-05-14 PROCEDURE — 25000003 PHARM REV CODE 250: Performed by: STUDENT IN AN ORGANIZED HEALTH CARE EDUCATION/TRAINING PROGRAM

## 2023-05-14 RX ADMIN — ISOSORBIDE DINITRATE 40 MG: 20 TABLET ORAL at 08:05

## 2023-05-14 RX ADMIN — CARVEDILOL 6.25 MG: 6.25 TABLET, FILM COATED ORAL at 08:05

## 2023-05-14 RX ADMIN — HYDRALAZINE HYDROCHLORIDE 75 MG: 50 TABLET ORAL at 08:05

## 2023-05-14 RX ADMIN — SERTRALINE HYDROCHLORIDE 50 MG: 50 TABLET ORAL at 08:05

## 2023-05-14 RX ADMIN — ALLOPURINOL 100 MG: 100 TABLET ORAL at 08:05

## 2023-05-14 RX ADMIN — ISOSORBIDE DINITRATE 40 MG: 20 TABLET ORAL at 04:05

## 2023-05-14 RX ADMIN — CARVEDILOL 6.25 MG: 6.25 TABLET, FILM COATED ORAL at 09:05

## 2023-05-14 RX ADMIN — HYDRALAZINE HYDROCHLORIDE 75 MG: 50 TABLET ORAL at 04:05

## 2023-05-14 RX ADMIN — FUROSEMIDE 240 MG: 10 INJECTION, SOLUTION INTRAMUSCULAR; INTRAVENOUS at 04:05

## 2023-05-14 RX ADMIN — DOCUSATE SODIUM 100 MG: 100 CAPSULE, LIQUID FILLED ORAL at 09:05

## 2023-05-14 RX ADMIN — ACETAZOLAMIDE 500 MG: 250 TABLET ORAL at 10:05

## 2023-05-14 RX ADMIN — ACETAZOLAMIDE 500 MG: 250 TABLET ORAL at 08:05

## 2023-05-14 RX ADMIN — PANTOPRAZOLE SODIUM 40 MG: 40 TABLET, DELAYED RELEASE ORAL at 08:05

## 2023-05-14 RX ADMIN — HYDRALAZINE HYDROCHLORIDE 75 MG: 50 TABLET ORAL at 09:05

## 2023-05-14 RX ADMIN — FUROSEMIDE 240 MG: 10 INJECTION, SOLUTION INTRAMUSCULAR; INTRAVENOUS at 10:05

## 2023-05-14 RX ADMIN — FUROSEMIDE 240 MG: 10 INJECTION, SOLUTION INTRAMUSCULAR; INTRAVENOUS at 08:05

## 2023-05-14 RX ADMIN — CLOPIDOGREL BISULFATE 75 MG: 75 TABLET ORAL at 08:05

## 2023-05-14 RX ADMIN — METOLAZONE 10 MG: 5 TABLET ORAL at 08:05

## 2023-05-14 RX ADMIN — INSULIN DETEMIR 8 UNITS: 100 INJECTION, SOLUTION SUBCUTANEOUS at 09:05

## 2023-05-14 RX ADMIN — ATORVASTATIN CALCIUM 80 MG: 40 TABLET, FILM COATED ORAL at 08:05

## 2023-05-14 RX ADMIN — AMITRIPTYLINE HYDROCHLORIDE 50 MG: 25 TABLET, FILM COATED ORAL at 10:05

## 2023-05-14 RX ADMIN — APIXABAN 5 MG: 5 TABLET, FILM COATED ORAL at 08:05

## 2023-05-14 RX ADMIN — APIXABAN 5 MG: 5 TABLET, FILM COATED ORAL at 09:05

## 2023-05-14 RX ADMIN — ISOSORBIDE DINITRATE 40 MG: 20 TABLET ORAL at 09:05

## 2023-05-14 NOTE — PROGRESS NOTES
[facility]rade - Cardiology [facility] Medicine  Progress Note    Patient Name: Suyapa Connelly  MRN: [id number]  Patient Class: IP- Inpatient   Admission Date: 5/1/2023  Length of Stay: 11 days  Attending Physician: Ruma Schulte MD  Primary Care Provider: Magen Christensen MD        Subjective:     Principal Problem:Acute on chronic combined systolic and diastolic heart failure        HPI:  57 y.o. female with past medical history of CAD s/p CABG, CKD, DM, HTN, HLD, s/p bilateral AKA who presnets with chest pain, difficulty breathing, leg swelling.     Per ED She has been unable to access any of her medications for the last 3 months. Over the last few months she has noted worsening shortness of breath, particulatly orthopnea, and leg swelling. In the last two weeks she endorses associated abdominal pain and swelling, reduced appetite, and pleuritic chest pain with deep inspiration. She endorses occasional lighthededness with changes in position. Last week she had an episode where she was unable to breathe for about two minutes which was very upsetting for her and her , but she recovered and did not seek care at that time. Today, she complains of significant difficulty breathing, swelling in her legs and abdomen, and reduced appetite.     No headaches, visual changes, URI symptoms, palpitations, nausea, vomiting, diarrhea, blood in her stool, dysuria, hematuria, numbness or weakness in her extremities. She does endorse occasional paresthesias associated with her chronic phantom limb pain. She additionally has had increased difficulties with depression recently, denies suicidal or homicidal ideation, but has had considerable stress and low mood.      Of note, the patient saw a new primary care provider 4/21 for similar concerns who recommended she proceed to the ED at that time, but they were unable due to financial and geographic concerns. She lives two hours away from [facility] and her  cares for her  and elderly mother with dementia. She has had decreased access to medical care over the last year, and unable to access most of her prescriptions. She had a 90 day supply of four medications (lasix, statin, clopidogrel, sertraline) but these were accidentally lost and she has been unable to afford a replacement. They present today for evaluation and to reestablish care and access to her prescriptions.     Labs on admission significant for an elevated BNP, hypocalcemia which corrected is 8.3, significant elevation in Creatinine and a slight elevation of troponin    CXR  Cardiomegaly with pulmonary interstitial edema, suggestive of pulmonary edema secondary to CHF.      Overview/Hospital Course:  Diuresis initiated with Lasix IV.  1.2L UOP in the past 24 hours.  Vitals reassuring with adequate sats on ambient air.  Still very edematious; Cardiology recs lasix gtt.  GDMT initiated. Pt given 2mg morphine for moderate to severe pain in legs from swelling. Diuresis increased to 40mg/hr due to inadequate urine output. Cr continued to increase. Cr stable at new Lasix rate. Diuresed 1.9L with some improvement in clinical status. S/p RHC that showed wedge of 26-32 with CVP of 15. Will continue diuresis with Lasix 30/hr with the addition of metolazone.       Interval History: Pt still having lower abdominal pain and lower extremity pain. Producing good urine daily. Swelling going down.     Review of Systems   Constitutional:  Negative for chills, fatigue and fever.   HENT:  Negative for congestion, dental problem, facial swelling, postnasal drip, rhinorrhea, sore throat and trouble swallowing.    Eyes:  Negative for photophobia and pain.   Respiratory:  Negative for cough, choking, chest tightness, shortness of breath and wheezing.    Cardiovascular:  Positive for leg swelling. Negative for chest pain.   Gastrointestinal:  Negative for abdominal distention, abdominal pain, diarrhea, nausea and vomiting.   Endocrine: Negative.     Genitourinary:  Negative for dysuria, flank pain, frequency and hematuria.   Musculoskeletal:  Negative for back pain.   Skin:  Negative for pallor and rash.   Allergic/Immunologic: Negative.    Neurological:  Negative for dizziness, syncope, weakness, numbness and headaches.   Psychiatric/Behavioral:  The patient is not nervous/anxious.    Objective:     Vital Signs (Most Recent):  Temp: 97.6 °F (36.4 °C) (05/14/23 1212)  Pulse: 87 (05/14/23 1212)  Resp: 19 (05/14/23 1212)  BP: (!) 142/67 (05/14/23 1212)  SpO2: 97 % (05/14/23 1212) Vital Signs (24h Range):  Temp:  [96.9 °F (36.1 °C)-97.9 °F (36.6 °C)] 97.6 °F (36.4 °C)  Pulse:  [77-94] 87  Resp:  [17-19] 19  SpO2:  [93 %-100 %] 97 %  BP: (103-142)/(50-67) 142/67     Weight: 93.1 kg (205 lb 4 oz)  Body mass index is 34.16 kg/m².    Intake/Output Summary (Last 24 hours) at 5/14/2023 1332  Last data filed at 5/14/2023 0846  Gross per 24 hour   Intake 738 ml   Output 3677 ml   Net -2939 ml         Physical Exam  Vitals and nursing note reviewed.   Constitutional:       General: She is not in acute distress.     Appearance: Normal appearance. She is obese. She is not ill-appearing or diaphoretic.   HENT:      Head: Normocephalic and atraumatic.      Right Ear: External ear normal.      Left Ear: External ear normal.      Nose: Nose normal.      Mouth/Throat:      Mouth: Mucous membranes are moist.      Pharynx: Oropharynx is clear.   Eyes:      General: No scleral icterus.     Extraocular Movements: Extraocular movements intact.      Conjunctiva/sclera: Conjunctivae normal.      Pupils: Pupils are equal, round, and reactive to light.   Cardiovascular:      Rate and Rhythm: Normal rate and regular rhythm.      Pulses: Normal pulses.      Heart sounds: Normal heart sounds. No murmur heard.  Pulmonary:      Effort: Pulmonary effort is normal. No respiratory distress.      Breath sounds: No stridor. Rales present. No wheezing or rhonchi.   Chest:      Chest wall: No  tenderness.   Abdominal:      General: Abdomen is flat. There is no distension.      Palpations: Abdomen is soft.      Tenderness: There is no abdominal tenderness. There is no guarding or rebound.   Musculoskeletal:         General: Swelling present. No tenderness. Normal range of motion.      Cervical back: Normal range of motion and neck supple. No rigidity or tenderness.      Right lower leg: Edema present.      Left lower leg: Edema present.      Comments: Trigger finger R middle finger   Skin:     General: Skin is warm and dry.      Capillary Refill: Capillary refill takes less than 2 seconds.      Coloration: Skin is not jaundiced.      Findings: No erythema.   Neurological:      General: No focal deficit present.      Mental Status: She is alert and oriented to person, place, and time.      Cranial Nerves: No cranial nerve deficit.      Motor: No weakness.   Psychiatric:         Mood and Affect: Mood normal.         Behavior: Behavior normal.           Significant Labs: All pertinent labs within the past 24 hours have been reviewed.    Significant Imaging: I have reviewed all pertinent imaging results/findings within the past 24 hours.      Assessment/Plan:      * Acute on chronic combined systolic and diastolic heart failure  Last Echo 6/21 showing     The estimated ejection fraction is 55%.   The left ventricle is normal in size with normal systolic function.   Normal left ventricular diastolic function.   Normal right ventricular size with normal right ventricular systolic function.   Mild tricuspid regurgitation.   The estimated PA systolic pressure is 31 mmHg.   Normal central venous pressure (3 mmHg).    Echo    Result Date: 5/2/2023  · The left ventricle is mildly enlarged with severely decreased systolic   function. The estimated ejection fraction is 15%.  · There is severe left ventricular global hypokinesis.  · Normal right ventricular size with low normal right ventricular systolic    function.  · Grade I left ventricular diastolic dysfunction.  · Biatrial enlargement.  · Mild-to-moderate mitral regurgitation.  · Severe tricuspid regurgitation.  · The estimated PA systolic pressure is 43 mmHg.  · Elevated central venous pressure (15 mmHg).         RHC  CVP 15  Wedge 25-32    Patient did not tolerate dobutamine, had chest pain    -Now on lasix 240 IV q6h   -Metolazone 10 daily  -Acetazolamide 500 BID   -Antihypertensive. GDMT, Anticoagulation, and diuresis management per Nephrology and Cardiology in a multidisciplinary team  -Qq4 BMPs with electrolyte repletion as needed; goal K>4, Mg>2  - 1.5L fluid restriction, low Na diet  - continue GDMT when possible  -Tylenol 1g up to 3 times a day          Myalgia  Will continue to treat pain as needed      ROSLYN (acute kidney injury)  Pt with increased Cr to 4.7 from 2.0 baseline. Possibly due to renovascular congestion from volume overload.     -Kerns  -Strict Is/Os  -Avoid nephrotoxic agents  -Daily CMPs        Acute hypoxemic respiratory failure  Resolved    CKD (chronic kidney disease), stage IV  History of CKD. See ROSLYN.      Above-knee amputation of right lower extremity  Prior history consistent with exam      Uses self-applied continuous glucose monitoring device  POCT glucose and daily CMPs    -Restart insulin for hyperglycemia      Dermatitis associated with moisture  Large body habitus with dermatitis to folds. Will monitor for infection.    -Zinc based powder  -Topical abx if indicated      Status post above knee amputation, left  History of.      Hypoalbuminemia  Pt with poor PO intake reported. Pt eats one meal a day and has associated nausea and vomiting. Will try antiemetics before feeds to improve intake. Will monitor daily intake and calorie replacement.     -Calorie count  -Diabetic/Renal diet  -Antiemetics before meals; Zofran 4 IV PRN  -Monitor Qtc for prolongation with antiemetics      Impaired functional mobility and endurance  PT/OT  for bedbound patient      Paroxysmal atrial fibrillation  Continue home rate control  NSR on EKG  Eliquis 5mg BID          Anemia  Asymptomatic. Higher than 8 months ago. No signs of acute bleed at this time. Haptoglobin elevated.    -CBC daily  -Transfuse if <7        S/P CABG x 1  History of. Sternal scar well healed.       CAD (coronary artery disease)  History of CAD with retrosternal chest pain. EKG negative to ST changes. Nonspecific T wave inversions.     -Cardiac tele  -EKG prn  -Bidil per Cards recs  -Continue plavix, hold ASA    CAROLIN (generalized anxiety disorder)  Continue home anxiety medications      Essential hypertension  Continue home antihypertensives      PAD (peripheral artery disease)  History of bilateral AKA.           VTE Risk Mitigation (From admission, onward)         Ordered     apixaban tablet 5 mg  2 times daily         05/11/23 1329     IP VTE HIGH RISK PATIENT  Once         05/07/23 1547                Discharge Planning   DEVAN: 5/17/2023     Code Status: Full Code   Is the patient medically ready for discharge?: No    Reason for patient still in hospital (select all that apply): Patient trending condition  Discharge Plan A: Home with family, Home Health   Discharge Delays: None known at this time      Onur Bueno MD  Department of Hospital Medicine   [facility] - Cardiology Stepdown

## 2023-05-14 NOTE — SUBJECTIVE & OBJECTIVE
Interval History: Pt still having lower abdominal pain and lower extremity pain. Producing good urine daily. Swelling going down.     Review of Systems   Constitutional:  Negative for chills, fatigue and fever.   HENT:  Negative for congestion, dental problem, facial swelling, postnasal drip, rhinorrhea, sore throat and trouble swallowing.    Eyes:  Negative for photophobia and pain.   Respiratory:  Negative for cough, choking, chest tightness, shortness of breath and wheezing.    Cardiovascular:  Positive for leg swelling. Negative for chest pain.   Gastrointestinal:  Negative for abdominal distention, abdominal pain, diarrhea, nausea and vomiting.   Endocrine: Negative.    Genitourinary:  Negative for dysuria, flank pain, frequency and hematuria.   Musculoskeletal:  Negative for back pain.   Skin:  Negative for pallor and rash.   Allergic/Immunologic: Negative.    Neurological:  Negative for dizziness, syncope, weakness, numbness and headaches.   Psychiatric/Behavioral:  The patient is not nervous/anxious.    Objective:     Vital Signs (Most Recent):  Temp: 97.6 °F (36.4 °C) (05/14/23 1212)  Pulse: 87 (05/14/23 1212)  Resp: 19 (05/14/23 1212)  BP: (!) 142/67 (05/14/23 1212)  SpO2: 97 % (05/14/23 1212) Vital Signs (24h Range):  Temp:  [96.9 °F (36.1 °C)-97.9 °F (36.6 °C)] 97.6 °F (36.4 °C)  Pulse:  [77-94] 87  Resp:  [17-19] 19  SpO2:  [93 %-100 %] 97 %  BP: (103-142)/(50-67) 142/67     Weight: 93.1 kg (205 lb 4 oz)  Body mass index is 34.16 kg/m².    Intake/Output Summary (Last 24 hours) at 5/14/2023 1332  Last data filed at 5/14/2023 0846  Gross per 24 hour   Intake 738 ml   Output 3677 ml   Net -2939 ml         Physical Exam  Vitals and nursing note reviewed.   Constitutional:       General: She is not in acute distress.     Appearance: Normal appearance. She is obese. She is not ill-appearing or diaphoretic.   HENT:      Head: Normocephalic and atraumatic.      Right Ear: External ear normal.      Left Ear:  External ear normal.      Nose: Nose normal.      Mouth/Throat:      Mouth: Mucous membranes are moist.      Pharynx: Oropharynx is clear.   Eyes:      General: No scleral icterus.     Extraocular Movements: Extraocular movements intact.      Conjunctiva/sclera: Conjunctivae normal.      Pupils: Pupils are equal, round, and reactive to light.   Cardiovascular:      Rate and Rhythm: Normal rate and regular rhythm.      Pulses: Normal pulses.      Heart sounds: Normal heart sounds. No murmur heard.  Pulmonary:      Effort: Pulmonary effort is normal. No respiratory distress.      Breath sounds: No stridor. Rales present. No wheezing or rhonchi.   Chest:      Chest wall: No tenderness.   Abdominal:      General: Abdomen is flat. There is no distension.      Palpations: Abdomen is soft.      Tenderness: There is no abdominal tenderness. There is no guarding or rebound.   Musculoskeletal:         General: Swelling present. No tenderness. Normal range of motion.      Cervical back: Normal range of motion and neck supple. No rigidity or tenderness.      Right lower leg: Edema present.      Left lower leg: Edema present.      Comments: Trigger finger R middle finger   Skin:     General: Skin is warm and dry.      Capillary Refill: Capillary refill takes less than 2 seconds.      Coloration: Skin is not jaundiced.      Findings: No erythema.   Neurological:      General: No focal deficit present.      Mental Status: She is alert and oriented to person, place, and time.      Cranial Nerves: No cranial nerve deficit.      Motor: No weakness.   Psychiatric:         Mood and Affect: Mood normal.         Behavior: Behavior normal.           Significant Labs: All pertinent labs within the past 24 hours have been reviewed.    Significant Imaging: I have reviewed all pertinent imaging results/findings within the past 24 hours.

## 2023-05-14 NOTE — ASSESSMENT & PLAN NOTE
Last Echo 6/21 showing     The estimated ejection fraction is 55%.   The left ventricle is normal in size with normal systolic function.   Normal left ventricular diastolic function.   Normal right ventricular size with normal right ventricular systolic function.   Mild tricuspid regurgitation.   The estimated PA systolic pressure is 31 mmHg.   Normal central venous pressure (3 mmHg).    Echo    Result Date: 5/2/2023  · The left ventricle is mildly enlarged with severely decreased systolic   function. The estimated ejection fraction is 15%.  · There is severe left ventricular global hypokinesis.  · Normal right ventricular size with low normal right ventricular systolic   function.  · Grade I left ventricular diastolic dysfunction.  · Biatrial enlargement.  · Mild-to-moderate mitral regurgitation.  · Severe tricuspid regurgitation.  · The estimated PA systolic pressure is 43 mmHg.  · Elevated central venous pressure (15 mmHg).         RHC  CVP 15  Wedge 25-32    Patient did not tolerate dobutamine, had chest pain    -Now on lasix 240 IV q6h   -Metolazone 10 daily  -Acetazolamide 500 BID   -Antihypertensive. GDMT, Anticoagulation, and diuresis management per Nephrology and Cardiology in a multidisciplinary team  -Qq4 BMPs with electrolyte repletion as needed; goal K>4, Mg>2  - 1.5L fluid restriction, low Na diet  - continue GDMT when possible  -Tylenol 1g up to 3 times a day

## 2023-05-14 NOTE — ASSESSMENT & PLAN NOTE
Consult Neuro-Dr Arthur    No emergent intervention   May perform LP  Seen on CT of head:  IMPRESSION:  1. Moderate ventriculomegaly involving the lateral ventricles and 3rd ventricle demonstrating mild  progression since the oldest comparison 05/25/2021, disproportionate to the degree of peripheral  sulcal prominence and demonstrating increased apical sulcal effacement bilaterally. 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 Consider further assessment with LP and opening pressure, and neurologic or  neurosurgical consult. Nuclear medicine cisternography may be helpful in further assessment.  2. Gradually increasing periventricular white matter hypodensities are nonspecific and might  represent progression of microvascular ischemia in this age group, however in the setting of  hydronephrosis this raises concern for an element of transependymal fluid migration.  3. There are small chronic subependymal nodules in the anterior which are unchanged, favoring  benign lesions such as subependymal hamartomas. Correlate clinically for tuberous sclerosis.  4. Chronic densely calcified dural-based 14 mm lesion in the left middle cranial fossa is unchanged,  consistent with densely calcified meningioma or incidental dural ossification without significant mass  effect.  5. These findings initiated a results reporting process. An addendum will be issued at the time of  clinician notification.   Problem: Potential for Falls  Goal: Patient will remain free of falls  Description: INTERVENTIONS:  - Educate patient/family on patient safety including physical limitations  - Instruct patient to call for assistance with activity   - Consult OT/PT to assist with strengthening/mobility   - Keep Call bell within reach  - Keep bed low and locked with side rails adjusted as appropriate  - Keep care items and personal belongings within reach  - Initiate and maintain comfort rounds  - Make Fall Risk Sign visible to staff  - Offer Toileting every 2 Hours, in advance of need  - Initiate/Maintain bed alarm  - Obtain necessary fall risk management equipment: socks  - Apply yellow socks and bracelet for high fall risk patients  - Consider moving patient to room near nurses station  Outcome: Progressing     Problem: SAFETY,RESTRAINT: NV/NON-SELF DESTRUCTIVE BEHAVIOR  Goal: Remains free of harm/injury (restraint for non violent/non self-detsructive behavior)  Description: INTERVENTIONS:  - Instruct patient/family regarding restraint use   - Assess and monitor physiologic and psychological status   - Provide interventions and comfort measures to meet assessed patient needs   - Identify and implement measures to help patient regain control  - Assess readiness for release of restraint   Outcome: Progressing  Goal: Returns to optimal restraint-free functioning  Description: INTERVENTIONS:  - Assess the patient's behavior and symptoms that indicate continued need for restraint  - Identify and implement measures to help patient regain control  - Assess readiness for release of restraint   Outcome: Progressing     Problem: MOBILITY - ADULT  Goal: Maintain or return to baseline ADL function  Description: INTERVENTIONS:  -  Assess patient's ability to carry out ADLs; assess patient's baseline for ADL function and identify physical deficits which impact ability to perform ADLs (bathing, care of mouth/teeth, toileting, grooming, dressing, etc )  - Assess/evaluate cause of self-care deficits   - Assess range of motion  - Assess patient's mobility; develop plan if impaired  - Assess patient's need for assistive devices and provide as appropriate  - Encourage maximum independence but intervene and supervise when necessary  - Involve family in performance of ADLs  - Assess for home care needs following discharge   - Consider OT consult to assist with ADL evaluation and planning for discharge  - Provide patient education as appropriate  Outcome: Progressing  Goal: Maintains/Returns to pre admission functional level  Description: INTERVENTIONS:  - Perform BMAT or MOVE assessment daily    - Set and communicate daily mobility goal to care team and patient/family/caregiver  - Collaborate with rehabilitation services on mobility goals if consulted  - Perform Range of Motion 3 times a day  - Reposition patient every 2 hours    - Dangle patient 3 times a day  - Stand patient 3 times a day  - Ambulate patient 3 times a day  - Out of bed to chair 3 times a day   - Out of bed for meals 3 times a day  - Out of bed for toileting  - Record patient progress and toleration of activity level   Outcome: Progressing

## 2023-05-15 LAB
ANION GAP SERPL CALC-SCNC: 15 MMOL/L (ref 8–16)
BASOPHILS # BLD AUTO: 0.06 K/UL (ref 0–0.2)
BASOPHILS NFR BLD: 1 % (ref 0–1.9)
BUN SERPL-MCNC: 69 MG/DL (ref 6–20)
CALCIUM SERPL-MCNC: 7 MG/DL (ref 8.7–10.5)
CHLORIDE SERPL-SCNC: 95 MMOL/L (ref 95–110)
CO2 SERPL-SCNC: 26 MMOL/L (ref 23–29)
CREAT SERPL-MCNC: 6 MG/DL (ref 0.5–1.4)
DIFFERENTIAL METHOD: ABNORMAL
EOSINOPHIL # BLD AUTO: 0.3 K/UL (ref 0–0.5)
EOSINOPHIL NFR BLD: 4.5 % (ref 0–8)
ERYTHROCYTE [DISTWIDTH] IN BLOOD BY AUTOMATED COUNT: 14.2 % (ref 11.5–14.5)
EST. GFR  (NO RACE VARIABLE): 7.7 ML/MIN/1.73 M^2
GLUCOSE SERPL-MCNC: 119 MG/DL (ref 70–110)
HCT VFR BLD AUTO: 30.3 % (ref 37–48.5)
HGB BLD-MCNC: 8.7 G/DL (ref 12–16)
IMM GRANULOCYTES # BLD AUTO: 0.02 K/UL (ref 0–0.04)
IMM GRANULOCYTES NFR BLD AUTO: 0.3 % (ref 0–0.5)
LYMPHOCYTES # BLD AUTO: 1 K/UL (ref 1–4.8)
LYMPHOCYTES NFR BLD: 17.1 % (ref 18–48)
MAGNESIUM SERPL-MCNC: 1.9 MG/DL (ref 1.6–2.6)
MCH RBC QN AUTO: 30.2 PG (ref 27–31)
MCHC RBC AUTO-ENTMCNC: 28.7 G/DL (ref 32–36)
MCV RBC AUTO: 105 FL (ref 82–98)
MONOCYTES # BLD AUTO: 0.6 K/UL (ref 0.3–1)
MONOCYTES NFR BLD: 11 % (ref 4–15)
NEUTROPHILS # BLD AUTO: 3.8 K/UL (ref 1.8–7.7)
NEUTROPHILS NFR BLD: 66.1 % (ref 38–73)
NRBC BLD-RTO: 0 /100 WBC
PLATELET # BLD AUTO: 375 K/UL (ref 150–450)
PMV BLD AUTO: 10.5 FL (ref 9.2–12.9)
POCT GLUCOSE: 111 MG/DL (ref 70–110)
POCT GLUCOSE: 123 MG/DL (ref 70–110)
POCT GLUCOSE: 125 MG/DL (ref 70–110)
POCT GLUCOSE: 96 MG/DL (ref 70–110)
POTASSIUM SERPL-SCNC: 3.8 MMOL/L (ref 3.5–5.1)
RBC # BLD AUTO: 2.88 M/UL (ref 4–5.4)
SODIUM SERPL-SCNC: 136 MMOL/L (ref 136–145)
WBC # BLD AUTO: 5.8 K/UL (ref 3.9–12.7)

## 2023-05-15 PROCEDURE — 83735 ASSAY OF MAGNESIUM: CPT | Performed by: INTERNAL MEDICINE

## 2023-05-15 PROCEDURE — 99233 PR SUBSEQUENT HOSPITAL CARE,LEVL III: ICD-10-PCS | Mod: ,,, | Performed by: INTERNAL MEDICINE

## 2023-05-15 PROCEDURE — 25000003 PHARM REV CODE 250

## 2023-05-15 PROCEDURE — 85025 COMPLETE CBC W/AUTO DIFF WBC: CPT | Performed by: HOSPITALIST

## 2023-05-15 PROCEDURE — 20600001 HC STEP DOWN PRIVATE ROOM

## 2023-05-15 PROCEDURE — 80048 BASIC METABOLIC PNL TOTAL CA: CPT | Performed by: STUDENT IN AN ORGANIZED HEALTH CARE EDUCATION/TRAINING PROGRAM

## 2023-05-15 PROCEDURE — 36415 COLL VENOUS BLD VENIPUNCTURE: CPT | Performed by: STUDENT IN AN ORGANIZED HEALTH CARE EDUCATION/TRAINING PROGRAM

## 2023-05-15 PROCEDURE — 99233 SBSQ HOSP IP/OBS HIGH 50: CPT | Mod: GC,,, | Performed by: INTERNAL MEDICINE

## 2023-05-15 PROCEDURE — 97530 THERAPEUTIC ACTIVITIES: CPT

## 2023-05-15 PROCEDURE — 97535 SELF CARE MNGMENT TRAINING: CPT

## 2023-05-15 PROCEDURE — 25000003 PHARM REV CODE 250: Performed by: STUDENT IN AN ORGANIZED HEALTH CARE EDUCATION/TRAINING PROGRAM

## 2023-05-15 PROCEDURE — 36415 COLL VENOUS BLD VENIPUNCTURE: CPT | Performed by: INTERNAL MEDICINE

## 2023-05-15 PROCEDURE — 63600175 PHARM REV CODE 636 W HCPCS: Performed by: STUDENT IN AN ORGANIZED HEALTH CARE EDUCATION/TRAINING PROGRAM

## 2023-05-15 PROCEDURE — 99233 SBSQ HOSP IP/OBS HIGH 50: CPT | Mod: ,,, | Performed by: INTERNAL MEDICINE

## 2023-05-15 PROCEDURE — 25000003 PHARM REV CODE 250: Performed by: HOSPITALIST

## 2023-05-15 PROCEDURE — 97110 THERAPEUTIC EXERCISES: CPT

## 2023-05-15 PROCEDURE — 99233 PR SUBSEQUENT HOSPITAL CARE,LEVL III: ICD-10-PCS | Mod: GC,,, | Performed by: INTERNAL MEDICINE

## 2023-05-15 PROCEDURE — 94761 N-INVAS EAR/PLS OXIMETRY MLT: CPT

## 2023-05-15 RX ORDER — SPIRONOLACTONE 25 MG/1
50 TABLET ORAL DAILY
Status: DISCONTINUED | OUTPATIENT
Start: 2023-05-15 | End: 2023-05-15

## 2023-05-15 RX ORDER — CALCIUM GLUCONATE 20 MG/ML
1 INJECTION, SOLUTION INTRAVENOUS ONCE
Status: COMPLETED | OUTPATIENT
Start: 2023-05-15 | End: 2023-05-15

## 2023-05-15 RX ADMIN — ISOSORBIDE DINITRATE 40 MG: 20 TABLET ORAL at 09:05

## 2023-05-15 RX ADMIN — PANTOPRAZOLE SODIUM 40 MG: 40 TABLET, DELAYED RELEASE ORAL at 08:05

## 2023-05-15 RX ADMIN — HYDRALAZINE HYDROCHLORIDE 75 MG: 50 TABLET ORAL at 02:05

## 2023-05-15 RX ADMIN — ATORVASTATIN CALCIUM 80 MG: 40 TABLET, FILM COATED ORAL at 08:05

## 2023-05-15 RX ADMIN — ACETAZOLAMIDE 500 MG: 250 TABLET ORAL at 08:05

## 2023-05-15 RX ADMIN — CLOPIDOGREL BISULFATE 75 MG: 75 TABLET ORAL at 08:05

## 2023-05-15 RX ADMIN — SERTRALINE HYDROCHLORIDE 50 MG: 50 TABLET ORAL at 08:05

## 2023-05-15 RX ADMIN — HYDRALAZINE HYDROCHLORIDE 75 MG: 50 TABLET ORAL at 09:05

## 2023-05-15 RX ADMIN — ACETAZOLAMIDE 500 MG: 250 TABLET ORAL at 09:05

## 2023-05-15 RX ADMIN — FUROSEMIDE 240 MG: 10 INJECTION, SOLUTION INTRAMUSCULAR; INTRAVENOUS at 08:05

## 2023-05-15 RX ADMIN — HYDRALAZINE HYDROCHLORIDE 75 MG: 50 TABLET ORAL at 08:05

## 2023-05-15 RX ADMIN — ALLOPURINOL 100 MG: 100 TABLET ORAL at 08:05

## 2023-05-15 RX ADMIN — METOLAZONE 10 MG: 5 TABLET ORAL at 08:05

## 2023-05-15 RX ADMIN — CARVEDILOL 6.25 MG: 6.25 TABLET, FILM COATED ORAL at 08:05

## 2023-05-15 RX ADMIN — APIXABAN 5 MG: 5 TABLET, FILM COATED ORAL at 08:05

## 2023-05-15 RX ADMIN — AMITRIPTYLINE HYDROCHLORIDE 50 MG: 25 TABLET, FILM COATED ORAL at 09:05

## 2023-05-15 RX ADMIN — FUROSEMIDE 240 MG: 10 INJECTION, SOLUTION INTRAMUSCULAR; INTRAVENOUS at 02:05

## 2023-05-15 RX ADMIN — ACETAMINOPHEN 1000 MG: 500 TABLET ORAL at 05:05

## 2023-05-15 RX ADMIN — ISOSORBIDE DINITRATE 40 MG: 20 TABLET ORAL at 02:05

## 2023-05-15 RX ADMIN — CARVEDILOL 6.25 MG: 6.25 TABLET, FILM COATED ORAL at 09:05

## 2023-05-15 RX ADMIN — APIXABAN 5 MG: 5 TABLET, FILM COATED ORAL at 09:05

## 2023-05-15 RX ADMIN — DOCUSATE SODIUM 100 MG: 100 CAPSULE, LIQUID FILLED ORAL at 08:05

## 2023-05-15 RX ADMIN — INSULIN DETEMIR 8 UNITS: 100 INJECTION, SOLUTION SUBCUTANEOUS at 09:05

## 2023-05-15 RX ADMIN — CALCIUM GLUCONATE 1 G: 20 INJECTION, SOLUTION INTRAVENOUS at 12:05

## 2023-05-15 RX ADMIN — ISOSORBIDE DINITRATE 40 MG: 20 TABLET ORAL at 08:05

## 2023-05-15 RX ADMIN — FUROSEMIDE 240 MG: 10 INJECTION, SOLUTION INTRAMUSCULAR; INTRAVENOUS at 10:05

## 2023-05-15 RX ADMIN — FUROSEMIDE 240 MG: 10 INJECTION, SOLUTION INTRAMUSCULAR; INTRAVENOUS at 04:05

## 2023-05-15 NOTE — PROGRESS NOTES
[facility] - Cardiology Stepdown  Nephrology  Progress Note    Patient Name: Suyapa Connelly  MRN: [id number]  Admission Date: 5/1/2023  Hospital Length of Stay: 12 days  Attending Provider: Ruma Schulte MD   Primary Care Physician: Magen Christensen MD  Principal Problem:Acute on chronic combined systolic and diastolic heart failure    Subjective:     HPI: 57 y.o. female with past medical history of CAD s/p CABG, CKD, DM, HTN, HLD, s/p bilateral AKA who presnets with chest pain, difficulty breathing, leg swelling. Her most recent ECHO revealed severe systolic dysfunction with EF of 15%. She has been started on diuretics with IV lasix and PO metolazone with adequate urinary response however her renal function has been deteriorating throughout the admission. She reports requiring dialysis at one time before, around the time she underwent her AKAs. Her baseline serum creatinine is in the 2s and it has trended up to 5 during this admission. Nephrology consulted for further evaluation of ROSLYN on CKD.         Interval History: Seen and evaluated by bedside. Serum creatinine trending up over the weekend. Good urine output while on diuretic regimen    Review of patient's allergies indicates:   Allergen Reactions    Ciprofloxacin Itching    Contrast media      Kidney injury    Iodine      Kidney injury     Current Facility-Administered Medications   Medication Frequency    acetaminophen tablet 1,000 mg Q6H PRN    acetaZOLAMIDE tablet 500 mg BID    allopurinol split tablet 100 mg Daily    amitriptyline tablet 50 mg QHS    apixaban tablet 5 mg BID    atorvastatin tablet 80 mg Daily    carvediloL tablet 6.25 mg BID    clopidogreL tablet 75 mg Daily    dextrose 10% bolus 125 mL 125 mL PRN    dextrose 10% bolus 250 mL 250 mL PRN    dextrose 40 % gel 15,000 mg PRN    dextrose 40 % gel 30,000 mg PRN    docusate sodium capsule 100 mg BID    furosemide (LASIX) 240 mg in dextrose 5 % (D5W) 50 mL IVPB Q6H    glucagon (human  recombinant) injection 1 mg PRN    hydrALAZINE tablet 75 mg TID    insulin aspart U-100 pen 0-5 Units QID (AC + HS) PRN    insulin detemir U-100 (Levemir) pen 8 Units QHS    isosorbide dinitrate tablet 40 mg TID    melatonin tablet 6 mg Nightly PRN    metOLazone tablet 10 mg Daily    naloxone 0.4 mg/mL injection 0.02 mg PRN    ondansetron disintegrating tablet 8 mg Q8H PRN    pantoprazole EC tablet 40 mg Daily    sertraline tablet 50 mg Daily    sodium chloride 0.9% flush 10 mL Q12H PRN    sodium chloride 0.9% flush 10 mL PRN       Objective:     Vital Signs (Most Recent):  Temp: 98.1 °F (36.7 °C) (05/15/23 0730)  Pulse: 94 (05/15/23 0730)  Resp: 17 (05/15/23 0730)  BP: (!) 112/57 (05/15/23 0730)  SpO2: 97 % (05/15/23 0730) Vital Signs (24h Range):  Temp:  [97.6 °F (36.4 °C)-98.5 °F (36.9 °C)] 98.1 °F (36.7 °C)  Pulse:  [] 94  Resp:  [17-19] 17  SpO2:  [95 %-99 %] 97 %  BP: (105-142)/(57-67) 112/57     Weight: 94.3 kg (207 lb 14.3 oz) (05/15/23 0507)  Body mass index is 34.6 kg/m².  Body surface area is 2.08 meters squared.    I/O last 3 completed shifts:  In: 618 [P.O.:618]  Out: 3876 [Urine:3875; Stool:1]    Physical Exam  Vitals and nursing note reviewed.   Constitutional:       General: She is not in acute distress.     Appearance: Normal appearance. She is obese. She is not ill-appearing or diaphoretic.   HENT:      Head: Normocephalic and atraumatic.      Right Ear: External ear normal.      Left Ear: External ear normal.      Nose: Nose normal.      Mouth/Throat:      Mouth: Mucous membranes are moist.      Pharynx: Oropharynx is clear.   Eyes:      General: No scleral icterus.     Extraocular Movements: Extraocular movements intact.      Conjunctiva/sclera: Conjunctivae normal.      Pupils: Pupils are equal, round, and reactive to light.   Cardiovascular:      Rate and Rhythm: Normal rate and regular rhythm.      Pulses: Normal pulses.      Heart sounds: Normal heart sounds. No murmur heard.      Comments: LIJ trialysis line   Pulmonary:      Effort: Pulmonary effort is normal. No respiratory distress.      Breath sounds: Normal breath sounds. No stridor. No wheezing or rhonchi.   Chest:      Chest wall: No tenderness.   Abdominal:      General: Abdomen is flat. There is no distension.      Palpations: Abdomen is soft.      Tenderness: There is no abdominal tenderness. There is no guarding or rebound.   Musculoskeletal:         General: Swelling present. No tenderness. Normal range of motion.      Cervical back: Normal range of motion and neck supple. No rigidity or tenderness.      Right lower leg: Edema present.      Left lower leg: Edema present.      Comments: Trigger finger R middle finger  BL AKA stump edema +2   Skin:     General: Skin is warm and dry.      Capillary Refill: Capillary refill takes less than 2 seconds.      Coloration: Skin is not jaundiced.      Findings: No erythema.   Neurological:      General: No focal deficit present.      Mental Status: She is alert and oriented to person, place, and time.      Cranial Nerves: No cranial nerve deficit.      Motor: No weakness.   Psychiatric:         Mood and Affect: Mood normal.         Behavior: Behavior normal.        Significant Labs:  CBC:   Recent Labs   Lab 05/15/23  0424   WBC 5.80   RBC 2.88*   HGB 8.7*   HCT 30.3*      *   MCH 30.2   MCHC 28.7*       CMP:   Recent Labs   Lab 05/12/23  2117 05/13/23  0954 05/14/23  1814   *   < > 130*   CALCIUM 7.4*   < > 7.1*   ALBUMIN 2.2*  --   --    PROT 6.3  --   --    *   < > 136   K 4.4   < > 3.7   CO2 25   < > 26   CL 96   < > 95   BUN 68*   < > 72*   CREATININE 5.5*   < > 6.3*   ALKPHOS 186*  --   --    ALT 16  --   --    AST 32  --   --    BILITOT 0.4  --   --     < > = values in this interval not displayed.            Assessment/Plan:     Cardiac/Vascular  * Acute on chronic combined systolic and diastolic heart failure  Management per primary       S/P CABG x  1  Management per primary       Renal/  ROSLYN (acute kidney injury)  ROSLYN on CKD, likely ischemic, CRS and diuresis  Baseline serum creatinine in the 2s, up to 3.9 on admission   Has been trending up to 6.3 on her most recent labs  Adequate urine output while on diuretics; has made 2.5L over the past 24h  Retroperitoneal US with no hydronephrosis   ECHO with sever systolic dysfunction, EF of 15%   RHC with PCWP of 30   Agree with diuresis for additional volume removal; continue lasix to 240mg iv q6h, metolazone to 10mg po daily and acetazolamide 500mg BID; can start spironolactone 50mg daily   Consider repeating RHC after 1-2 additional days of diuresis   Pt at high risk of requiring hemodialysis again in the near future ir persistent deterioration of her renal function   Dose medications to GFR    Avoid nephrotoxic agents as much as possible               Jeremy Juan MD  Nephrology  [facility] - [facility]    ATTENDING PHYSICIAN ATTESTATION  I have personally verified the history and examined the patient. I thoroughly reviewed the demographic, clinical, laboratorial and imaging information available in medical records. I agree with the assessment and recommendations provided by the subspecialty resident who was under my supervision.

## 2023-05-15 NOTE — SUBJECTIVE & OBJECTIVE
Interval History: Seen and evaluated by bedside. Serum creatinine trending up over the weekend. Good urine output while on diuretic regimen    Review of patient's allergies indicates:   Allergen Reactions    Ciprofloxacin Itching    Contrast media      Kidney injury    Iodine      Kidney injury     Current Facility-Administered Medications   Medication Frequency    acetaminophen tablet 1,000 mg Q6H PRN    acetaZOLAMIDE tablet 500 mg BID    allopurinol split tablet 100 mg Daily    amitriptyline tablet 50 mg QHS    apixaban tablet 5 mg BID    atorvastatin tablet 80 mg Daily    carvediloL tablet 6.25 mg BID    clopidogreL tablet 75 mg Daily    dextrose 10% bolus 125 mL 125 mL PRN    dextrose 10% bolus 250 mL 250 mL PRN    dextrose 40 % gel 15,000 mg PRN    dextrose 40 % gel 30,000 mg PRN    docusate sodium capsule 100 mg BID    furosemide (LASIX) 240 mg in dextrose 5 % (D5W) 50 mL IVPB Q6H    glucagon (human recombinant) injection 1 mg PRN    hydrALAZINE tablet 75 mg TID    insulin aspart U-100 pen 0-5 Units QID (AC + HS) PRN    insulin detemir U-100 (Levemir) pen 8 Units QHS    isosorbide dinitrate tablet 40 mg TID    melatonin tablet 6 mg Nightly PRN    metOLazone tablet 10 mg Daily    naloxone 0.4 mg/mL injection 0.02 mg PRN    ondansetron disintegrating tablet 8 mg Q8H PRN    pantoprazole EC tablet 40 mg Daily    sertraline tablet 50 mg Daily    sodium chloride 0.9% flush 10 mL Q12H PRN    sodium chloride 0.9% flush 10 mL PRN       Objective:     Vital Signs (Most Recent):  Temp: 98.1 °F (36.7 °C) (05/15/23 0730)  Pulse: 94 (05/15/23 0730)  Resp: 17 (05/15/23 0730)  BP: (!) 112/57 (05/15/23 0730)  SpO2: 97 % (05/15/23 0730) Vital Signs (24h Range):  Temp:  [97.6 °F (36.4 °C)-98.5 °F (36.9 °C)] 98.1 °F (36.7 °C)  Pulse:  [] 94  Resp:  [17-19] 17  SpO2:  [95 %-99 %] 97 %  BP: (105-142)/(57-67) 112/57     Weight: 94.3 kg (207 lb 14.3 oz) (05/15/23 7985)  Body mass index is 34.6 kg/m².  Body surface area is 2.08  meters squared.    I/O last 3 completed shifts:  In: 618 [P.O.:618]  Out: 3876 [Urine:3875; Stool:1]     Physical Exam  Vitals and nursing note reviewed.   Constitutional:       General: She is not in acute distress.     Appearance: Normal appearance. She is obese. She is not ill-appearing or diaphoretic.   HENT:      Head: Normocephalic and atraumatic.      Right Ear: External ear normal.      Left Ear: External ear normal.      Nose: Nose normal.      Mouth/Throat:      Mouth: Mucous membranes are moist.      Pharynx: Oropharynx is clear.   Eyes:      General: No scleral icterus.     Extraocular Movements: Extraocular movements intact.      Conjunctiva/sclera: Conjunctivae normal.      Pupils: Pupils are equal, round, and reactive to light.   Cardiovascular:      Rate and Rhythm: Normal rate and regular rhythm.      Pulses: Normal pulses.      Heart sounds: Normal heart sounds. No murmur heard.     Comments: LIJ trialysis line   Pulmonary:      Effort: Pulmonary effort is normal. No respiratory distress.      Breath sounds: Normal breath sounds. No stridor. No wheezing or rhonchi.   Chest:      Chest wall: No tenderness.   Abdominal:      General: Abdomen is flat. There is no distension.      Palpations: Abdomen is soft.      Tenderness: There is no abdominal tenderness. There is no guarding or rebound.   Musculoskeletal:         General: Swelling present. No tenderness. Normal range of motion.      Cervical back: Normal range of motion and neck supple. No rigidity or tenderness.      Right lower leg: Edema present.      Left lower leg: Edema present.      Comments: Trigger finger R middle finger  BL AKA stump edema +2   Skin:     General: Skin is warm and dry.      Capillary Refill: Capillary refill takes less than 2 seconds.      Coloration: Skin is not jaundiced.      Findings: No erythema.   Neurological:      General: No focal deficit present.      Mental Status: She is alert and oriented to person, place,  and time.      Cranial Nerves: No cranial nerve deficit.      Motor: No weakness.   Psychiatric:         Mood and Affect: Mood normal.         Behavior: Behavior normal.        Significant Labs:  CBC:   Recent Labs   Lab 05/15/23  0424   WBC 5.80   RBC 2.88*   HGB 8.7*   HCT 30.3*      *   MCH 30.2   MCHC 28.7*       CMP:   Recent Labs   Lab 05/12/23  2117 05/13/23  0954 05/14/23  1814   *   < > 130*   CALCIUM 7.4*   < > 7.1*   ALBUMIN 2.2*  --   --    PROT 6.3  --   --    *   < > 136   K 4.4   < > 3.7   CO2 25   < > 26   CL 96   < > 95   BUN 68*   < > 72*   CREATININE 5.5*   < > 6.3*   ALKPHOS 186*  --   --    ALT 16  --   --    AST 32  --   --    BILITOT 0.4  --   --     < > = values in this interval not displayed.

## 2023-05-15 NOTE — PROGRESS NOTES
[facility] - Cardiology [facility] Medicine  Progress Note    Patient Name: Suyapa Connelly  MRN: [id number]  Patient Class: IP- Inpatient   Admission Date: 5/1/2023  Length of Stay: 12 days  Attending Physician: Ruma Schulte MD  Primary Care Provider: Magen Christensen MD        Subjective:     Principal Problem:Acute on chronic combined systolic and diastolic heart failure        HPI:  57 y.o. female with past medical history of CAD s/p CABG, CKD, DM, HTN, HLD, s/p bilateral AKA who presnets with chest pain, difficulty breathing, leg swelling.     Per ED She has been unable to access any of her medications for the last 3 months. Over the last few months she has noted worsening shortness of breath, particulatly orthopnea, and leg swelling. In the last two weeks she endorses associated abdominal pain and swelling, reduced appetite, and pleuritic chest pain with deep inspiration. She endorses occasional lighthededness with changes in position. Last week she had an episode where she was unable to breathe for about two minutes which was very upsetting for her and her , but she recovered and did not seek care at that time. Today, she complains of significant difficulty breathing, swelling in her legs and abdomen, and reduced appetite.     No headaches, visual changes, URI symptoms, palpitations, nausea, vomiting, diarrhea, blood in her stool, dysuria, hematuria, numbness or weakness in her extremities. She does endorse occasional paresthesias associated with her chronic phantom limb pain. She additionally has had increased difficulties with depression recently, denies suicidal or homicidal ideation, but has had considerable stress and low mood.      Of note, the patient saw a new primary care provider 4/21 for similar concerns who recommended she proceed to the ED at that time, but they were unable due to financial and geographic concerns. She lives two hours away from [facility] and her  cares for her  and elderly mother with dementia. She has had decreased access to medical care over the last year, and unable to access most of her prescriptions. She had a 90 day supply of four medications (lasix, statin, clopidogrel, sertraline) but these were accidentally lost and she has been unable to afford a replacement. They present today for evaluation and to reestablish care and access to her prescriptions.     Labs on admission significant for an elevated BNP, hypocalcemia which corrected is 8.3, significant elevation in Creatinine and a slight elevation of troponin    CXR  Cardiomegaly with pulmonary interstitial edema, suggestive of pulmonary edema secondary to CHF.      Overview/Hospital Course:  Diuresis initiated with Lasix IV.  1.2L UOP in the past 24 hours.  Vitals reassuring with adequate sats on ambient air.  Still very edematious; Cardiology recs lasix gtt.  GDMT initiated. Pt given 2mg morphine for moderate to severe pain in legs from swelling. Diuresis increased to 40mg/hr due to inadequate urine output. Cr continued to increase. Cr stable at new Lasix rate. Diuresed 1.9L with some improvement in clinical status. S/p RHC that showed wedge of 26-32 with CVP of 15. Will continue diuresis with Lasix 30/hr with the addition of metolazone.       Interval History: Pt having perioral numbness. Calcium IV given. Pt diuresing well with 2L out. Nephrology wants to hold on diuresis for now as she's still producing good urine. Cr with mild improvement. Still having leg and lower abdominal swelling.     Review of Systems   Constitutional:  Negative for chills, fatigue and fever.   HENT:  Negative for congestion, dental problem, facial swelling, postnasal drip, rhinorrhea, sore throat and trouble swallowing.    Eyes:  Negative for photophobia and pain.   Respiratory:  Negative for cough, choking, chest tightness, shortness of breath and wheezing.    Cardiovascular:  Positive for leg swelling. Negative for chest pain.    Gastrointestinal:  Positive for abdominal pain. Negative for abdominal distention, diarrhea, nausea and vomiting.   Endocrine: Negative.    Genitourinary:  Negative for dysuria, flank pain, frequency and hematuria.   Musculoskeletal:  Positive for myalgias. Negative for back pain.   Skin:  Negative for pallor and rash.   Allergic/Immunologic: Negative.    Neurological:  Negative for dizziness, syncope, weakness, numbness and headaches.   Psychiatric/Behavioral:  The patient is not nervous/anxious.    Objective:     Vital Signs (Most Recent):  Temp: 98.4 °F (36.9 °C) (05/15/23 1116)  Pulse: 94 (05/15/23 1116)  Resp: 18 (05/15/23 1116)  BP: (!) 105/51 (05/15/23 1116)  SpO2: 99 % (05/15/23 1116) Vital Signs (24h Range):  Temp:  [97.6 °F (36.4 °C)-98.5 °F (36.9 °C)] 98.4 °F (36.9 °C)  Pulse:  [] 94  Resp:  [17-18] 18  SpO2:  [95 %-99 %] 99 %  BP: (105-125)/(51-59) 105/51     Weight: 94.3 kg (207 lb 14.3 oz)  Body mass index is 34.6 kg/m².    Intake/Output Summary (Last 24 hours) at 5/15/2023 1235  Last data filed at 5/15/2023 0944  Gross per 24 hour   Intake 300 ml   Output 1200 ml   Net -900 ml         Physical Exam  Vitals and nursing note reviewed.   Constitutional:       General: She is not in acute distress.     Appearance: Normal appearance. She is obese. She is not ill-appearing or diaphoretic.   HENT:      Head: Normocephalic and atraumatic.      Right Ear: External ear normal.      Left Ear: External ear normal.      Nose: Nose normal.      Mouth/Throat:      Mouth: Mucous membranes are moist.      Pharynx: Oropharynx is clear.   Eyes:      General: No scleral icterus.     Extraocular Movements: Extraocular movements intact.      Conjunctiva/sclera: Conjunctivae normal.      Pupils: Pupils are equal, round, and reactive to light.   Cardiovascular:      Rate and Rhythm: Normal rate and regular rhythm.      Pulses: Normal pulses.      Heart sounds: Normal heart sounds. No murmur heard.  Pulmonary:       Effort: Pulmonary effort is normal. No respiratory distress.      Breath sounds: No stridor. No wheezing, rhonchi or rales.   Chest:      Chest wall: No tenderness.   Abdominal:      General: Abdomen is flat. There is no distension.      Palpations: Abdomen is soft.      Tenderness: There is no abdominal tenderness. There is no guarding or rebound.   Musculoskeletal:         General: Swelling present. No tenderness. Normal range of motion.      Cervical back: Normal range of motion and neck supple. No rigidity or tenderness.      Right lower leg: Edema present.      Left lower leg: Edema present.      Comments: Trigger finger R middle finger   Skin:     General: Skin is warm and dry.      Capillary Refill: Capillary refill takes less than 2 seconds.      Coloration: Skin is not jaundiced.      Findings: No erythema.   Neurological:      General: No focal deficit present.      Mental Status: She is alert and oriented to person, place, and time.      Cranial Nerves: No cranial nerve deficit.      Motor: No weakness.   Psychiatric:         Mood and Affect: Mood normal.         Behavior: Behavior normal.           Significant Labs: All pertinent labs within the past 24 hours have been reviewed.    Significant Imaging: I have reviewed all pertinent imaging results/findings within the past 24 hours.      Assessment/Plan:      * Acute on chronic combined systolic and diastolic heart failure  Last Echo 6/21 showing    The estimated ejection fraction is 55%.  The left ventricle is normal in size with normal systolic function.  Normal left ventricular diastolic function.  Normal right ventricular size with normal right ventricular systolic function.  Mild tricuspid regurgitation.  The estimated PA systolic pressure is 31 mmHg.  Normal central venous pressure (3 mmHg).    Echo    Result Date: 5/2/2023  · The left ventricle is mildly enlarged with severely decreased systolic   function. The estimated ejection fraction is  15%.  · There is severe left ventricular global hypokinesis.  · Normal right ventricular size with low normal right ventricular systolic   function.  · Grade I left ventricular diastolic dysfunction.  · Biatrial enlargement.  · Mild-to-moderate mitral regurgitation.  · Severe tricuspid regurgitation.  · The estimated PA systolic pressure is 43 mmHg.  · Elevated central venous pressure (15 mmHg).         RHC  CVP 15  Wedge 25-32    Patient did not tolerate dobutamine, had chest pain. Dobutamine dc'd    -Now on lasix 240 IV q6h   -Metolazone 10 daily  -Acetazolamide 500 BID   -Spironolactone 50 recommended but unable to give due to eGFR <20.   -Repeat RHC in 2 days  -Antihypertensive. GDMT, Anticoagulation, and diuresis management per Nephrology and Cardiology in a multidisciplinary team  -Qq4 BMPs with electrolyte repletion as needed; goal K>4, Mg>2  - 1.5L fluid restriction, low Na diet  - continue GDMT when possible  -Tylenol 1g up to 3 times a day          Myalgia  Will continue to treat pain as needed      ROSLYN (acute kidney injury)  Pt with increased Cr to 4.7 from 2.0 baseline. Possibly due to renovascular congestion from volume overload.     -Kerns  -Strict Is/Os  -Avoid nephrotoxic agents  -Daily CMPs        Acute hypoxemic respiratory failure  Resolved    CKD (chronic kidney disease), stage IV  History of CKD. See ROSLYN.      Above-knee amputation of right lower extremity  Prior history consistent with exam      Uses self-applied continuous glucose monitoring device  POCT glucose and daily CMPs    -Restart insulin for hyperglycemia      Dermatitis associated with moisture  Large body habitus with dermatitis to folds. Will monitor for infection.    -Zinc based powder  -Topical abx if indicated      Status post above knee amputation, left  History of.      Hypoalbuminemia  Pt with poor PO intake reported. Pt eats one meal a day and has associated nausea and vomiting. Will try antiemetics before feeds to improve  intake. Will monitor daily intake and calorie replacement.     -Calorie count  -Diabetic/Renal diet  -Antiemetics before meals; Zofran 4 IV PRN  -Monitor Qtc for prolongation with antiemetics      Impaired functional mobility and endurance  PT/OT for bedbound patient      Paroxysmal atrial fibrillation  Continue home rate control  NSR on EKG  Eliquis 5mg BID          Anemia  Asymptomatic. Higher than 8 months ago. No signs of acute bleed at this time. Haptoglobin elevated.    -CBC daily  -Transfuse if <7        S/P CABG x 1  History of. Sternal scar well healed.       CAD (coronary artery disease)  History of CAD with retrosternal chest pain. EKG negative to ST changes. Nonspecific T wave inversions.     -Cardiac tele  -EKG prn  -Bidil per Cards recs  -Continue plavix, hold ASA    CAROLIN (generalized anxiety disorder)  Continue home anxiety medications      Essential hypertension  Continue home antihypertensives      PAD (peripheral artery disease)  History of bilateral AKA.           VTE Risk Mitigation (From admission, onward)           Ordered     apixaban tablet 5 mg  2 times daily         05/11/23 1329     IP VTE HIGH RISK PATIENT  Once         05/07/23 1547                    Discharge Planning   DEVAN: 5/17/2023     Code Status: Full Code   Is the patient medically ready for discharge?: No    Reason for patient still in hospital (select all that apply): Patient trending condition, Treatment and Consult recommendations  Discharge Plan A: Home with family, Home Health   Discharge Delays: None known at this time      Onur Bueno MD  Department of Hospital Medicine   [facility] - Cardiology Stepdown

## 2023-05-15 NOTE — PT/OT/SLP PROGRESS
"Physical Therapy Treatment    Patient Name:  Suyapa Connelly   MRN:  [id number]    Recommendations:     Discharge Recommendations: other (see comments)  Discharge Equipment Recommendations: drop arm commode, hospital bed, lift device  Barriers to discharge:  increased level of caregiver support required     Assessment:     Suyapa Connelly is a 57 y.o. female admitted with a medical diagnosis of Acute on chronic combined systolic and diastolic heart failure.  She presents with the following impairments/functional limitations: weakness, impaired endurance, impaired self care skills, impaired functional mobility, impaired balance, decreased lower extremity function, pain, impaired cardiopulmonary response to activity, edema. Pt remains motivated to participate in therapy session. Activity tolerance is primarily limited by abdominal and RLE pain. Rehab Prognosis: Good; patient would benefit from acute skilled PT services to address these deficits and reach maximum level of function.      Recent Surgery: Procedure(s) (LRB):  INSERTION, CATHETER, RIGHT HEART (Right)  Insertion, Catheter, Central Venous, Hemodialysis 5 Days Post-Op    Plan:     During this hospitalization, patient to be seen 3 x/week to address the identified rehab impairments via therapeutic activities, therapeutic exercises, neuromuscular re-education, wheelchair management/training and progress toward the following goals:    Plan of Care Expires:  06/06/23    Subjective     Chief Complaint: pain   Patient/Family Comments/goals: "I want to be home, I don't want to be in the hospital"   Pain/Comfort:  Pain Rating 1: 7/10  Location 1:  (R hip and abdomen)      Objective:     Communicated with RN prior to session.  Patient found HOB elevated with PureWick, telemetry, peripheral IV upon PT entry to room.     General Precautions: Standard, fall  Orthopedic Precautions: N/A  Braces: N/A  Respiratory Status: Room air    Functional Mobility:    Bed Mobility: "   Rolling: to right with moderate assistance and left with minimum assistance  Supine > Sit: moderate assistance  Sit > Supine: minimum assistance  Scooting:   Pt performed forward scooting towards EOB with SBA, using BUEs   Pt performed lateral scooting to R along EOB with minimal assistance, using BUEs     Balance:   Sitting balance: FAIR+: Maintains balance through MINIMAL excursions of active trunk motion                AM-PAC 6 CLICK MOBILITY  Turning over in bed (including adjusting bedclothes, sheets and blankets)?: 2  Sitting down on and standing up from a chair with arms (e.g., wheelchair, bedside commode, etc.): 1  Moving from lying on back to sitting on the side of the bed?: 2  Moving to and from a bed to a chair (including a wheelchair)?: 2  Need to walk in hospital room?: 1  Climbing 3-5 steps with a railing?: 1  Basic Mobility Total Score: 9       Treatment & Education:  Therapeutic activities aimed to increase pt's independence, safety, and efficiency with bed mobility and scooting along EOB to prepare for transfers to wheelchair. See above for assistance levels. Pt tolerated sitting EOB x 20 minutes with assistance ranging from CGA to SBA.    Pt educated on BUE therex to improve functional strength and endurance.     Patient left HOB elevated in left sidelying for pressure relief with all lines intact, call button in reach, and RN notified.    GOALS:   Multidisciplinary Problems       Physical Therapy Goals          Problem: Physical Therapy    Goal Priority Disciplines Outcome Goal Variances Interventions   Physical Therapy Goal     PT, PT/OT Ongoing, Progressing     Description: Goals to be met by: 2023     Patient will increase functional independence with mobility by performin. Supine to sit with Minimal Assistance.  2. Sit to supine with Minimal Assistance.  3. Bed to chair transfer with Minimal Assistance using LRAD.  4. Wheelchair propulsion x50 feet with Minimal Assistance using  bilateral upper extremities.  5. Spouse verbalizes and demonstrates understanding on family training with focus on transfers.                          Time Tracking:     PT Received On: 05/15/23  PT Start Time: 0906     PT Stop Time: 0934  PT Total Time (min): 28 min     Billable Minutes: Therapeutic Activity 18 min and Therapeutic Exercise 10 min    Treatment Type: Treatment  PT/PTA: PT     Number of PTA visits since last PT visit: 0     05/15/2023

## 2023-05-15 NOTE — ASSESSMENT & PLAN NOTE
ROSLYN on CKD, likely ischemic, CRS and diuresis  Baseline serum creatinine in the 2s, up to 3.9 on admission   Has been trending up to 6.3 on her most recent labs  Adequate urine output while on diuretics; has made 2.5L over the past 24h  Retroperitoneal US with no hydronephrosis   ECHO with sever systolic dysfunction, EF of 15%   RHC with PCWP of 30   Agree with diuresis for additional volume removal; continue lasix to 240mg iv q6h, metolazone to 10mg po daily and acetazolamide 500mg BID; can start spironolactone 50mg daily   Consider repeating RHC after 1-2 additional days of diuresis   Pt at high risk of requiring hemodialysis again in the near future ir persistent deterioration of her renal function   Dose medications to GFR    Avoid nephrotoxic agents as much as possible

## 2023-05-15 NOTE — PT/OT/SLP PROGRESS
"Occupational Therapy   Co-Treatment  Co-treatment performed due to patient's multiple deficits requiring two skilled therapists to appropriately and safely assess patient's strength and endurance while facilitating functional tasks in addition to accommodating for patient's activity tolerance.   Name: Suyapa Connelly  MRN: [id number]  Admitting Diagnosis:  Acute on chronic combined systolic and diastolic heart failure  5 Days Post-Op    Recommendations:     Discharge Recommendations: other (see comments)  Discharge Equipment Recommendations:  drop arm commode, hospital bed, lift device  Barriers to discharge:  Other (Comment) (increased skilled (A) required)    Assessment:     Suyapa Connelly is a 57 y.o. female with a medical diagnosis of Acute on chronic combined systolic and diastolic heart failure.  Performance deficits affecting function are weakness, impaired endurance, impaired self care skills, impaired functional mobility, impaired balance, pain, decreased safety awareness, decreased lower extremity function, decreased upper extremity function, impaired cardiopulmonary response to activity, edema. Pt tolerated PT/OT fairly well with good participation and motivation. She was primarily limited due to pain in RLE and abdomen as well as decreased functional endurance requiring increased time to complete all functional tasks. Pt would continue to benefit from skilled OT services to maximize functional independence, reduce caregiver burden, and facilitate safe discharge.    Rehab Prognosis:  Good; patient would benefit from acute skilled OT services to address these deficits and reach maximum level of function.       Plan:     Patient to be seen 4 x/week to address the above listed problems via self-care/home management, therapeutic activities, therapeutic exercises, neuromuscular re-education  Plan of Care Expires: 06/07/23  Plan of Care Reviewed with: patient    Subjective   "I don't want to be like this and " "need this much help"  Chief Complaint: pain, decline in functional independence  Patient/Family Comments/goals: increase independence  Pain/Comfort:  Pain Rating 1: 7/10  Location - Side 1: Bilateral  Location - Orientation 1: generalized  Location 1:  (R hip and abdomen)  Pain Addressed 1: Reposition, Cessation of Activity, Distraction, Nurse notified  Pain Rating Post-Intervention 1:  (pt did not rate)    Objective:     Communicated with: Danika BRIGHT prior to session.  Patient found HOB elevated with telemetry, PureWick, peripheral IV, oxygen upon OT entry to room.    General Precautions: Standard, fall    Orthopedic Precautions:N/A  Braces: N/A  Respiratory Status: Nasal cannula, flow 4 L/min     Occupational Performance:     Bed Mobility:    Patient completed Rolling/Turning to Left with  minimum assistance  Patient completed Rolling/Turning to Right with moderate assistance  Patient completed Scooting anteriorly on EOB x2 trials with stand by assistance; scooting to the left x3 trials with minimum assistance  Patient completed Supine to Sit with moderate assistance for trunk management  HOB flat  Patient completed Sit to Supine with minimum assistance  HOB flat   Pt tolerated sitting EOB for ~15 minutes with CGA progressing to SBA with bilateral/unilateral support on bedside  Lateral tricep dips to the left 1 x 5 reps    Functional Mobility/Transfers:  STS t/f NT    Activities of Daily Living:  Grooming: stand by assistance to wash face sitting EOB  Toileting: minimum assistance required to remove soiled jenise pads with mobility; CGA for balance as pt cleansed anteriorly while sitting EOB with unilateral support on bed rail      [facility] 6 Click ADL: 14    Treatment & Education:  -Education on energy conservation and task modification to maximize safety and (I) during ADLs and mobility  -Education on importance of OOB activity to improve overall activity tolerance and promote recovery  -Provided education " regarding role of OT, POC, & discharge recommendations with pt verbalizing understanding.  Pt had no further questions & when asked whether there were any concerns pt reported none.     Patient left left sidelying with all lines intact, call button in reach, and RN notified    GOALS:   Multidisciplinary Problems       Occupational Therapy Goals          Problem: Occupational Therapy    Goal Priority Disciplines Outcome Interventions   Occupational Therapy Goal     OT, PT/OT Ongoing, Progressing    Description: Goals to be met by: 5/25     Patient will increase functional independence with ADLs by performing:    UE Dressing with Modified Hickman.  LE Dressing with Modified Hickman.  Sitting at edge of bed x10 minutes with Stand-by Assistance. - Met  Rolling to Bilateral with Stand-by Assistance.   Supine to sit with Hickman.                         Time Tracking:     OT Date of Treatment: 05/15/23  OT Start Time: 0905  OT Stop Time: 0935  OT Total Time (min): 30 min    Billable Minutes:Self Care/Home Management 15  Therapeutic Activity 15    OT/MACKENZIE: OT          5/15/2023

## 2023-05-15 NOTE — ASSESSMENT & PLAN NOTE
Last Echo 6/21 showing     The estimated ejection fraction is 55%.   The left ventricle is normal in size with normal systolic function.   Normal left ventricular diastolic function.   Normal right ventricular size with normal right ventricular systolic function.   Mild tricuspid regurgitation.   The estimated PA systolic pressure is 31 mmHg.   Normal central venous pressure (3 mmHg).    Echo    Result Date: 5/2/2023  · The left ventricle is mildly enlarged with severely decreased systolic   function. The estimated ejection fraction is 15%.  · There is severe left ventricular global hypokinesis.  · Normal right ventricular size with low normal right ventricular systolic   function.  · Grade I left ventricular diastolic dysfunction.  · Biatrial enlargement.  · Mild-to-moderate mitral regurgitation.  · Severe tricuspid regurgitation.  · The estimated PA systolic pressure is 43 mmHg.  · Elevated central venous pressure (15 mmHg).         RHC  CVP 15  Wedge 25-32    Patient did not tolerate dobutamine, had chest pain. Dobutamine dc'd    -Now on lasix 240 IV q6h   -Metolazone 10 daily  -Acetazolamide 500 BID   -Spironolactone 50  -Repeat RHC in 2 days  -Antihypertensive. GDMT, Anticoagulation, and diuresis management per Nephrology and Cardiology in a multidisciplinary team  -Qq4 BMPs with electrolyte repletion as needed; goal K>4, Mg>2  - 1.5L fluid restriction, low Na diet  - continue GDMT when possible  -Tylenol 1g up to 3 times a day

## 2023-05-15 NOTE — SUBJECTIVE & OBJECTIVE
Interval History: Pt having perioral numbness. Calcium IV given. Pt diuresing well with 2L out. Nephrology wants to hold on diuresis for now as she's still producing good urine. Cr with mild improvement. Still having leg and lower abdominal swelling.     Review of Systems   Constitutional:  Negative for chills, fatigue and fever.   HENT:  Negative for congestion, dental problem, facial swelling, postnasal drip, rhinorrhea, sore throat and trouble swallowing.    Eyes:  Negative for photophobia and pain.   Respiratory:  Negative for cough, choking, chest tightness, shortness of breath and wheezing.    Cardiovascular:  Positive for leg swelling. Negative for chest pain.   Gastrointestinal:  Positive for abdominal pain. Negative for abdominal distention, diarrhea, nausea and vomiting.   Endocrine: Negative.    Genitourinary:  Negative for dysuria, flank pain, frequency and hematuria.   Musculoskeletal:  Positive for myalgias. Negative for back pain.   Skin:  Negative for pallor and rash.   Allergic/Immunologic: Negative.    Neurological:  Negative for dizziness, syncope, weakness, numbness and headaches.   Psychiatric/Behavioral:  The patient is not nervous/anxious.    Objective:     Vital Signs (Most Recent):  Temp: 98.4 °F (36.9 °C) (05/15/23 1116)  Pulse: 94 (05/15/23 1116)  Resp: 18 (05/15/23 1116)  BP: (!) 105/51 (05/15/23 1116)  SpO2: 99 % (05/15/23 1116) Vital Signs (24h Range):  Temp:  [97.6 °F (36.4 °C)-98.5 °F (36.9 °C)] 98.4 °F (36.9 °C)  Pulse:  [] 94  Resp:  [17-18] 18  SpO2:  [95 %-99 %] 99 %  BP: (105-125)/(51-59) 105/51     Weight: 94.3 kg (207 lb 14.3 oz)  Body mass index is 34.6 kg/m².    Intake/Output Summary (Last 24 hours) at 5/15/2023 1235  Last data filed at 5/15/2023 0944  Gross per 24 hour   Intake 300 ml   Output 1200 ml   Net -900 ml         Physical Exam  Vitals and nursing note reviewed.   Constitutional:       General: She is not in acute distress.     Appearance: Normal appearance.  She is obese. She is not ill-appearing or diaphoretic.   HENT:      Head: Normocephalic and atraumatic.      Right Ear: External ear normal.      Left Ear: External ear normal.      Nose: Nose normal.      Mouth/Throat:      Mouth: Mucous membranes are moist.      Pharynx: Oropharynx is clear.   Eyes:      General: No scleral icterus.     Extraocular Movements: Extraocular movements intact.      Conjunctiva/sclera: Conjunctivae normal.      Pupils: Pupils are equal, round, and reactive to light.   Cardiovascular:      Rate and Rhythm: Normal rate and regular rhythm.      Pulses: Normal pulses.      Heart sounds: Normal heart sounds. No murmur heard.  Pulmonary:      Effort: Pulmonary effort is normal. No respiratory distress.      Breath sounds: No stridor. No wheezing, rhonchi or rales.   Chest:      Chest wall: No tenderness.   Abdominal:      General: Abdomen is flat. There is no distension.      Palpations: Abdomen is soft.      Tenderness: There is no abdominal tenderness. There is no guarding or rebound.   Musculoskeletal:         General: Swelling present. No tenderness. Normal range of motion.      Cervical back: Normal range of motion and neck supple. No rigidity or tenderness.      Right lower leg: Edema present.      Left lower leg: Edema present.      Comments: Trigger finger R middle finger   Skin:     General: Skin is warm and dry.      Capillary Refill: Capillary refill takes less than 2 seconds.      Coloration: Skin is not jaundiced.      Findings: No erythema.   Neurological:      General: No focal deficit present.      Mental Status: She is alert and oriented to person, place, and time.      Cranial Nerves: No cranial nerve deficit.      Motor: No weakness.   Psychiatric:         Mood and Affect: Mood normal.         Behavior: Behavior normal.           Significant Labs: All pertinent labs within the past 24 hours have been reviewed.    Significant Imaging: I have reviewed all pertinent imaging  results/findings within the past 24 hours.

## 2023-05-16 LAB
ALBUMIN SERPL BCP-MCNC: 2.2 G/DL (ref 3.5–5.2)
ALP SERPL-CCNC: 194 U/L (ref 55–135)
ALT SERPL W/O P-5'-P-CCNC: 10 U/L (ref 10–44)
ANION GAP SERPL CALC-SCNC: 14 MMOL/L (ref 8–16)
AST SERPL-CCNC: 25 U/L (ref 10–40)
BASOPHILS # BLD AUTO: 0.07 K/UL (ref 0–0.2)
BASOPHILS NFR BLD: 1.3 % (ref 0–1.9)
BILIRUB SERPL-MCNC: 0.2 MG/DL (ref 0.1–1)
BUN SERPL-MCNC: 73 MG/DL (ref 6–20)
CA-I BLDV-SCNC: 0.76 MMOL/L (ref 1.06–1.42)
CALCIUM SERPL-MCNC: 6.5 MG/DL (ref 8.7–10.5)
CHLORIDE SERPL-SCNC: 95 MMOL/L (ref 95–110)
CO2 SERPL-SCNC: 27 MMOL/L (ref 23–29)
CREAT SERPL-MCNC: 6.3 MG/DL (ref 0.5–1.4)
DIFFERENTIAL METHOD: ABNORMAL
EOSINOPHIL # BLD AUTO: 0.2 K/UL (ref 0–0.5)
EOSINOPHIL NFR BLD: 3.2 % (ref 0–8)
ERYTHROCYTE [DISTWIDTH] IN BLOOD BY AUTOMATED COUNT: 14.1 % (ref 11.5–14.5)
EST. GFR  (NO RACE VARIABLE): 7.3 ML/MIN/1.73 M^2
GLUCOSE SERPL-MCNC: 115 MG/DL (ref 70–110)
HCT VFR BLD AUTO: 29.8 % (ref 37–48.5)
HGB BLD-MCNC: 9 G/DL (ref 12–16)
IMM GRANULOCYTES # BLD AUTO: 0.02 K/UL (ref 0–0.04)
IMM GRANULOCYTES NFR BLD AUTO: 0.4 % (ref 0–0.5)
LYMPHOCYTES # BLD AUTO: 0.9 K/UL (ref 1–4.8)
LYMPHOCYTES NFR BLD: 17.9 % (ref 18–48)
MCH RBC QN AUTO: 31 PG (ref 27–31)
MCHC RBC AUTO-ENTMCNC: 30.2 G/DL (ref 32–36)
MCV RBC AUTO: 103 FL (ref 82–98)
MONOCYTES # BLD AUTO: 0.7 K/UL (ref 0.3–1)
MONOCYTES NFR BLD: 12.4 % (ref 4–15)
NEUTROPHILS # BLD AUTO: 3.4 K/UL (ref 1.8–7.7)
NEUTROPHILS NFR BLD: 64.8 % (ref 38–73)
NRBC BLD-RTO: 0 /100 WBC
PHOSPHATE SERPL-MCNC: 6.5 MG/DL (ref 2.7–4.5)
PLATELET # BLD AUTO: 375 K/UL (ref 150–450)
PMV BLD AUTO: 10.7 FL (ref 9.2–12.9)
POCT GLUCOSE: 102 MG/DL (ref 70–110)
POCT GLUCOSE: 118 MG/DL (ref 70–110)
POCT GLUCOSE: 125 MG/DL (ref 70–110)
POCT GLUCOSE: 168 MG/DL (ref 70–110)
POTASSIUM SERPL-SCNC: 3.7 MMOL/L (ref 3.5–5.1)
PROT SERPL-MCNC: 6.5 G/DL (ref 6–8.4)
PTH-INTACT SERPL-MCNC: 902.5 PG/ML (ref 9–77)
RBC # BLD AUTO: 2.9 M/UL (ref 4–5.4)
SODIUM SERPL-SCNC: 136 MMOL/L (ref 136–145)
WBC # BLD AUTO: 5.25 K/UL (ref 3.9–12.7)

## 2023-05-16 PROCEDURE — 85025 COMPLETE CBC W/AUTO DIFF WBC: CPT | Performed by: HOSPITALIST

## 2023-05-16 PROCEDURE — 36415 COLL VENOUS BLD VENIPUNCTURE: CPT | Performed by: INTERNAL MEDICINE

## 2023-05-16 PROCEDURE — 94761 N-INVAS EAR/PLS OXIMETRY MLT: CPT

## 2023-05-16 PROCEDURE — 20600001 HC STEP DOWN PRIVATE ROOM

## 2023-05-16 PROCEDURE — 25000003 PHARM REV CODE 250: Performed by: HOSPITALIST

## 2023-05-16 PROCEDURE — 25000003 PHARM REV CODE 250: Performed by: INTERNAL MEDICINE

## 2023-05-16 PROCEDURE — 80053 COMPREHEN METABOLIC PANEL: CPT

## 2023-05-16 PROCEDURE — 25000003 PHARM REV CODE 250

## 2023-05-16 PROCEDURE — 99233 PR SUBSEQUENT HOSPITAL CARE,LEVL III: ICD-10-PCS | Mod: GC,,, | Performed by: INTERNAL MEDICINE

## 2023-05-16 PROCEDURE — 25000003 PHARM REV CODE 250: Performed by: STUDENT IN AN ORGANIZED HEALTH CARE EDUCATION/TRAINING PROGRAM

## 2023-05-16 PROCEDURE — 82330 ASSAY OF CALCIUM: CPT | Performed by: INTERNAL MEDICINE

## 2023-05-16 PROCEDURE — 63600175 PHARM REV CODE 636 W HCPCS: Performed by: INTERNAL MEDICINE

## 2023-05-16 PROCEDURE — 99233 SBSQ HOSP IP/OBS HIGH 50: CPT | Mod: GC,,, | Performed by: INTERNAL MEDICINE

## 2023-05-16 PROCEDURE — 97530 THERAPEUTIC ACTIVITIES: CPT

## 2023-05-16 PROCEDURE — 97535 SELF CARE MNGMENT TRAINING: CPT

## 2023-05-16 PROCEDURE — 83970 ASSAY OF PARATHORMONE: CPT | Performed by: INTERNAL MEDICINE

## 2023-05-16 PROCEDURE — 84100 ASSAY OF PHOSPHORUS: CPT

## 2023-05-16 PROCEDURE — 63600175 PHARM REV CODE 636 W HCPCS: Performed by: STUDENT IN AN ORGANIZED HEALTH CARE EDUCATION/TRAINING PROGRAM

## 2023-05-16 RX ORDER — CALCITRIOL 0.25 UG/1
0.25 CAPSULE ORAL DAILY
Status: DISCONTINUED | OUTPATIENT
Start: 2023-05-16 | End: 2023-05-17

## 2023-05-16 RX ORDER — CALCIUM ACETATE 667 MG/1
667 CAPSULE ORAL
Status: DISCONTINUED | OUTPATIENT
Start: 2023-05-16 | End: 2023-05-18

## 2023-05-16 RX ADMIN — FUROSEMIDE 240 MG: 10 INJECTION, SOLUTION INTRAMUSCULAR; INTRAVENOUS at 08:05

## 2023-05-16 RX ADMIN — ISOSORBIDE DINITRATE 40 MG: 20 TABLET ORAL at 08:05

## 2023-05-16 RX ADMIN — INSULIN DETEMIR 8 UNITS: 100 INJECTION, SOLUTION SUBCUTANEOUS at 08:05

## 2023-05-16 RX ADMIN — HYDRALAZINE HYDROCHLORIDE 75 MG: 50 TABLET ORAL at 09:05

## 2023-05-16 RX ADMIN — APIXABAN 5 MG: 5 TABLET, FILM COATED ORAL at 09:05

## 2023-05-16 RX ADMIN — HYDRALAZINE HYDROCHLORIDE 75 MG: 50 TABLET ORAL at 08:05

## 2023-05-16 RX ADMIN — ACETAZOLAMIDE 500 MG: 250 TABLET ORAL at 08:05

## 2023-05-16 RX ADMIN — CALCIUM ACETATE 667 MG: 667 CAPSULE ORAL at 05:05

## 2023-05-16 RX ADMIN — PANTOPRAZOLE SODIUM 40 MG: 40 TABLET, DELAYED RELEASE ORAL at 09:05

## 2023-05-16 RX ADMIN — CALCITRIOL CAPSULES 0.25 MCG 0.25 MCG: 0.25 CAPSULE ORAL at 03:05

## 2023-05-16 RX ADMIN — FUROSEMIDE 240 MG: 10 INJECTION, SOLUTION INTRAMUSCULAR; INTRAVENOUS at 05:05

## 2023-05-16 RX ADMIN — AMITRIPTYLINE HYDROCHLORIDE 50 MG: 25 TABLET, FILM COATED ORAL at 08:05

## 2023-05-16 RX ADMIN — HYDRALAZINE HYDROCHLORIDE 75 MG: 50 TABLET ORAL at 03:05

## 2023-05-16 RX ADMIN — CARVEDILOL 6.25 MG: 6.25 TABLET, FILM COATED ORAL at 09:05

## 2023-05-16 RX ADMIN — ATORVASTATIN CALCIUM 80 MG: 40 TABLET, FILM COATED ORAL at 09:05

## 2023-05-16 RX ADMIN — ACETAMINOPHEN 1000 MG: 500 TABLET ORAL at 10:05

## 2023-05-16 RX ADMIN — CALCIUM ACETATE 667 MG: 667 CAPSULE ORAL at 11:05

## 2023-05-16 RX ADMIN — ALLOPURINOL 100 MG: 100 TABLET ORAL at 09:05

## 2023-05-16 RX ADMIN — METOLAZONE 10 MG: 5 TABLET ORAL at 09:05

## 2023-05-16 RX ADMIN — ISOSORBIDE DINITRATE 40 MG: 20 TABLET ORAL at 03:05

## 2023-05-16 RX ADMIN — CARVEDILOL 6.25 MG: 6.25 TABLET, FILM COATED ORAL at 08:05

## 2023-05-16 RX ADMIN — SERTRALINE HYDROCHLORIDE 50 MG: 50 TABLET ORAL at 09:05

## 2023-05-16 RX ADMIN — CLOPIDOGREL BISULFATE 75 MG: 75 TABLET ORAL at 09:05

## 2023-05-16 RX ADMIN — CALCIUM GLUCONATE 3 G: 98 INJECTION, SOLUTION INTRAVENOUS at 04:05

## 2023-05-16 RX ADMIN — FUROSEMIDE 240 MG: 10 INJECTION, SOLUTION INTRAMUSCULAR; INTRAVENOUS at 02:05

## 2023-05-16 RX ADMIN — ACETAZOLAMIDE 500 MG: 250 TABLET ORAL at 09:05

## 2023-05-16 RX ADMIN — ISOSORBIDE DINITRATE 40 MG: 20 TABLET ORAL at 09:05

## 2023-05-16 RX ADMIN — FUROSEMIDE 240 MG: 10 INJECTION, SOLUTION INTRAMUSCULAR; INTRAVENOUS at 11:05

## 2023-05-16 RX ADMIN — APIXABAN 5 MG: 5 TABLET, FILM COATED ORAL at 08:05

## 2023-05-16 NOTE — ASSESSMENT & PLAN NOTE
Last Echo 6/21 showing     The estimated ejection fraction is 55%.   The left ventricle is normal in size with normal systolic function.   Normal left ventricular diastolic function.   Normal right ventricular size with normal right ventricular systolic function.   Mild tricuspid regurgitation.   The estimated PA systolic pressure is 31 mmHg.   Normal central venous pressure (3 mmHg).    No echocardiogram results found for the past 14 days.  RHC  CVP 15  Wedge 25-32    Patient did not tolerate dobutamine, had chest pain. Dobutamine dc'd    -Now on lasix 240 IV q6h   -Metolazone 10 daily  -Acetazolamide 500 BID   -Repeat RHC in 2 days  -Antihypertensive. GDMT, Anticoagulation, and diuresis management per Nephrology and Cardiology in a multidisciplinary team  -Qq4 BMPs with electrolyte repletion as needed; goal K>4, Mg>2  - 1.5L fluid restriction, low Na diet  - continue GDMT when possible  -Tylenol 1g up to 3 times a day

## 2023-05-16 NOTE — SUBJECTIVE & OBJECTIVE
Interval History: See above; worsening somnolence. Fluid status improving, but shortness of breath worsening.     Review of Systems   Constitutional:  Negative for chills, fatigue and fever.   HENT:  Negative for congestion, dental problem, facial swelling, postnasal drip, rhinorrhea, sore throat and trouble swallowing.    Eyes:  Negative for photophobia and pain.   Respiratory:  Negative for cough, choking, chest tightness, shortness of breath and wheezing.    Cardiovascular:  Positive for leg swelling. Negative for chest pain.   Gastrointestinal:  Positive for abdominal pain. Negative for abdominal distention, diarrhea, nausea and vomiting.   Endocrine: Negative.    Genitourinary:  Negative for dysuria, flank pain, frequency and hematuria.   Musculoskeletal:  Positive for myalgias. Negative for back pain.   Skin:  Negative for pallor and rash.   Allergic/Immunologic: Negative.    Neurological:  Negative for dizziness, syncope, weakness, numbness and headaches.   Psychiatric/Behavioral:  The patient is not nervous/anxious.    Objective:     Vital Signs (Most Recent):  Temp: 96.7 °F (35.9 °C) (05/16/23 1221)  Pulse: 94 (05/16/23 1221)  Resp: 16 (05/16/23 1221)  BP: (!) 104/52 (05/16/23 1221)  SpO2: 98 % (05/16/23 1221) Vital Signs (24h Range):  Temp:  [96.7 °F (35.9 °C)-98.7 °F (37.1 °C)] 96.7 °F (35.9 °C)  Pulse:  [] 94  Resp:  [16-20] 16  SpO2:  [94 %-99 %] 98 %  BP: ()/(46-62) 104/52     Weight: 97.8 kg (215 lb 9.8 oz)  Body mass index is 35.88 kg/m².    Intake/Output Summary (Last 24 hours) at 5/16/2023 1250  Last data filed at 5/16/2023 0926  Gross per 24 hour   Intake 700 ml   Output 2950 ml   Net -2250 ml         Physical Exam  Vitals and nursing note reviewed.   Constitutional:       General: She is not in acute distress.     Appearance: Normal appearance. She is obese. She is not ill-appearing or diaphoretic.   HENT:      Head: Normocephalic and atraumatic.      Right Ear: External ear normal.       Left Ear: External ear normal.      Nose: Nose normal.      Mouth/Throat:      Mouth: Mucous membranes are moist.      Pharynx: Oropharynx is clear.   Eyes:      General: No scleral icterus.     Extraocular Movements: Extraocular movements intact.      Conjunctiva/sclera: Conjunctivae normal.      Pupils: Pupils are equal, round, and reactive to light.   Cardiovascular:      Rate and Rhythm: Normal rate and regular rhythm.      Pulses: Normal pulses.      Heart sounds: Normal heart sounds. No murmur heard.  Pulmonary:      Effort: Pulmonary effort is normal. No respiratory distress.      Breath sounds: No stridor. No wheezing, rhonchi or rales.   Chest:      Chest wall: No tenderness.   Abdominal:      General: Abdomen is flat. There is no distension.      Palpations: Abdomen is soft.      Tenderness: There is no abdominal tenderness. There is no guarding or rebound.   Musculoskeletal:         General: Swelling present. No tenderness. Normal range of motion.      Cervical back: Normal range of motion and neck supple. No rigidity or tenderness.      Right lower leg: Edema present.      Left lower leg: Edema present.      Comments: Trigger finger R middle finger   Skin:     General: Skin is warm and dry.      Capillary Refill: Capillary refill takes less than 2 seconds.      Coloration: Skin is not jaundiced.      Findings: No erythema.   Neurological:      General: No focal deficit present.      Mental Status: She is alert and oriented to person, place, and time.      Cranial Nerves: No cranial nerve deficit.      Motor: No weakness.   Psychiatric:         Mood and Affect: Mood normal.         Behavior: Behavior normal.           Significant Labs: All pertinent labs within the past 24 hours have been reviewed.    Significant Imaging: I have reviewed all pertinent imaging results/findings within the past 24 hours.

## 2023-05-16 NOTE — SUBJECTIVE & OBJECTIVE
Interval History: Seen and evaluated by bedside. Serum creatinine at 6.6 this AM. UOP of 2.4L over the past 24h while on diuretic regimen.     Review of patient's allergies indicates:   Allergen Reactions    Ciprofloxacin Itching    Contrast media      Kidney injury    Iodine      Kidney injury     Current Facility-Administered Medications   Medication Frequency    acetaminophen tablet 1,000 mg Q6H PRN    acetaZOLAMIDE tablet 500 mg BID    allopurinol split tablet 100 mg Daily    amitriptyline tablet 50 mg QHS    apixaban tablet 5 mg BID    atorvastatin tablet 80 mg Daily    carvediloL tablet 6.25 mg BID    clopidogreL tablet 75 mg Daily    dextrose 10% bolus 125 mL 125 mL PRN    dextrose 10% bolus 250 mL 250 mL PRN    dextrose 40 % gel 15,000 mg PRN    dextrose 40 % gel 30,000 mg PRN    furosemide (LASIX) 240 mg in dextrose 5 % (D5W) 50 mL IVPB Q6H    glucagon (human recombinant) injection 1 mg PRN    hydrALAZINE tablet 75 mg TID    insulin aspart U-100 pen 0-5 Units QID (AC + HS) PRN    insulin detemir U-100 (Levemir) pen 8 Units QHS    isosorbide dinitrate tablet 40 mg TID    melatonin tablet 6 mg Nightly PRN    metOLazone tablet 10 mg Daily    naloxone 0.4 mg/mL injection 0.02 mg PRN    ondansetron disintegrating tablet 8 mg Q8H PRN    pantoprazole EC tablet 40 mg Daily    sertraline tablet 50 mg Daily    sodium chloride 0.9% flush 10 mL Q12H PRN    sodium chloride 0.9% flush 10 mL PRN       Objective:     Vital Signs (Most Recent):  Temp: 97.9 °F (36.6 °C) (05/16/23 0618)  Pulse: 90 (05/16/23 0618)  Resp: 16 (05/16/23 0618)  BP: (!) 125/53 (05/16/23 0618)  SpO2: 97 % (05/16/23 0618) Vital Signs (24h Range):  Temp:  [97.8 °F (36.6 °C)-98.7 °F (37.1 °C)] 97.9 °F (36.6 °C)  Pulse:  [] 90  Resp:  [16-20] 16  SpO2:  [94 %-99 %] 97 %  BP: ()/(46-62) 125/53     Weight: 97.8 kg (215 lb 9.8 oz) (05/16/23 5476)  Body mass index is 35.88 kg/m².  Body surface area is 2.12 meters squared.    I/O last 3 completed  shifts:  In: 880 [P.O.:880]  Out: 3300 [Urine:3300]     Physical Exam  Vitals and nursing note reviewed.   Constitutional:       General: She is not in acute distress.     Appearance: Normal appearance. She is obese. She is not ill-appearing or diaphoretic.   HENT:      Head: Normocephalic and atraumatic.      Right Ear: External ear normal.      Left Ear: External ear normal.      Nose: Nose normal.      Mouth/Throat:      Mouth: Mucous membranes are moist.      Pharynx: Oropharynx is clear.   Eyes:      General: No scleral icterus.     Extraocular Movements: Extraocular movements intact.      Conjunctiva/sclera: Conjunctivae normal.      Pupils: Pupils are equal, round, and reactive to light.   Cardiovascular:      Rate and Rhythm: Normal rate and regular rhythm.      Pulses: Normal pulses.      Heart sounds: Normal heart sounds. No murmur heard.  Pulmonary:      Effort: Pulmonary effort is normal. No respiratory distress.      Breath sounds: Normal breath sounds. No stridor. No wheezing or rhonchi.      Comments: On NC  Abdominal:      General: Abdomen is flat. There is no distension.      Palpations: Abdomen is soft.      Tenderness: There is no abdominal tenderness. There is no guarding.   Musculoskeletal:         General: Swelling present. No tenderness. Normal range of motion.      Cervical back: Normal range of motion and neck supple. No rigidity or tenderness.      Right lower leg: Edema present.      Left lower leg: Edema present.      Comments: Trigger finger R middle finger  BL AKA stump edema +2   Skin:     General: Skin is warm and dry.      Capillary Refill: Capillary refill takes less than 2 seconds.      Coloration: Skin is not jaundiced.      Findings: No erythema.   Neurological:      General: No focal deficit present.      Mental Status: She is alert and oriented to person, place, and time.   Psychiatric:         Mood and Affect: Mood normal.         Behavior: Behavior normal.        Significant  Labs:  CBC:   Recent Labs   Lab 05/16/23 0519   WBC 5.25   RBC 2.90*   HGB 9.0*   HCT 29.8*      *   MCH 31.0   MCHC 30.2*       CMP:   Recent Labs   Lab 05/16/23 0519   *   CALCIUM 6.5*   ALBUMIN 2.2*   PROT 6.5      K 3.7   CO2 27   CL 95   BUN 73*   CREATININE 6.3*   ALKPHOS 194*   ALT 10   AST 25   BILITOT 0.2

## 2023-05-16 NOTE — ASSESSMENT & PLAN NOTE
ROSLYN on CKD, likely ischemic, CRS and diuresis  Baseline serum creatinine in the 2s, up to 3.9 on admission; up to 6.3 on her most recent labs  Adequate urine output while on diuretics; has made 2.4L over the past 24h  Retroperitoneal US with no hydronephrosis    ECHO with severe systolic dysfunction, EF of 15%    RHC with PCWP of 30   Continue diuresis for additional volume removal; continue lasix to 240mg iv q6h, metolazone to 10mg po daily and acetazolamide 500mg BID; can start spironolactone 50mg daily   Consider repeating RHC during this admission to assess response to diuresis   Pt remains at high risk of requiring hemodialysis again in the near future ir persistent deterioration of her renal function   Dose medications to GFR    Avoid nephrotoxic agents as much as possible

## 2023-05-16 NOTE — NURSING
Spoke with Jenna in [facility], who informed the nurse that dialysis access placement would not occur today, and is on hold while monitoring renal status for now, per anesthesia MD which she spoke with. DEANGELO West stated that another order for [facility] would have to  be created, as it was deleted per order received from anesthesia.

## 2023-05-16 NOTE — ASSESSMENT & PLAN NOTE
Ionized calcium at 0.76. Ca correcting at 8.4.     -Calcium acetate for calcium repletion and for phos binding.

## 2023-05-16 NOTE — PLAN OF CARE
[facility] - Cardiology Stepdown  Discharge Reassessment    Primary Care Provider: Magen Christensen MD    Expected Discharge Date: 5/18/2023    Reassessment (most recent)       Discharge Reassessment - 05/16/23 1602          Discharge Reassessment    Assessment Type Discharge Planning Reassessment     Discharge Plan discussed with: Patient     Communicated DEVAN with patient/caregiver Date not available/Unable to determine     Discharge Plan A Home Health     Discharge Plan B Skilled Nursing Facility     DME Needed Upon Discharge  3-in-1 commode;lift device;hospital bed     Transition of Care Barriers None     Why the patient remains in the hospital Requires continued medical care        Post-Acute Status    Post-Acute Authorization Home Health     Home Health Status Pending medical clearance/testing                 SW met with pt and  at bedside to discuss discharge planning.  Pt and  reported they will be discharging to [id number], [place] and confirmed preference for [facility].  SW name and ext on whiteboard.  Will continue to follow.      Jenna Bonilla LMSW  [facility]  [id number]

## 2023-05-16 NOTE — PROGRESS NOTES
[facility] - Cardiology [facility] Medicine  Progress Note    Patient Name: Suyapa Connelly  MRN: [id number]  Patient Class: IP- Inpatient   Admission Date: 5/1/2023  Length of Stay: 13 days  Attending Physician: Ruma Schulte MD  Primary Care Provider: Magen Christensen MD        Subjective:     Principal Problem:Acute on chronic combined systolic and diastolic heart failure        HPI:  57 y.o. female with past medical history of CAD s/p CABG, CKD, DM, HTN, HLD, s/p bilateral AKA who presnets with chest pain, difficulty breathing, leg swelling.     Per ED She has been unable to access any of her medications for the last 3 months. Over the last few months she has noted worsening shortness of breath, particulatly orthopnea, and leg swelling. In the last two weeks she endorses associated abdominal pain and swelling, reduced appetite, and pleuritic chest pain with deep inspiration. She endorses occasional lighthededness with changes in position. Last week she had an episode where she was unable to breathe for about two minutes which was very upsetting for her and her , but she recovered and did not seek care at that time. Today, she complains of significant difficulty breathing, swelling in her legs and abdomen, and reduced appetite.     No headaches, visual changes, URI symptoms, palpitations, nausea, vomiting, diarrhea, blood in her stool, dysuria, hematuria, numbness or weakness in her extremities. She does endorse occasional paresthesias associated with her chronic phantom limb pain. She additionally has had increased difficulties with depression recently, denies suicidal or homicidal ideation, but has had considerable stress and low mood.      Of note, the patient saw a new primary care provider 4/21 for similar concerns who recommended she proceed to the ED at that time, but they were unable due to financial and geographic concerns. She lives two hours away from [facility] and her  cares for her  and elderly mother with dementia. She has had decreased access to medical care over the last year, and unable to access most of her prescriptions. She had a 90 day supply of four medications (lasix, statin, clopidogrel, sertraline) but these were accidentally lost and she has been unable to afford a replacement. They present today for evaluation and to reestablish care and access to her prescriptions.     Labs on admission significant for an elevated BNP, hypocalcemia which corrected is 8.3, significant elevation in Creatinine and a slight elevation of troponin    CXR  Cardiomegaly with pulmonary interstitial edema, suggestive of pulmonary edema secondary to CHF.      Overview/Hospital Course:  Diuresis initiated with Lasix IV.  1.2L UOP in the past 24 hours.  Vitals reassuring with adequate sats on ambient air.  Still very edematious; Cardiology recs lasix gtt.  GDMT initiated. Pt given 2mg morphine for moderate to severe pain in legs from swelling. Diuresis increased to 40mg/hr due to inadequate urine output. Cr continued to increase. Cr stable at new Lasix rate. Diuresed 1.9L with some improvement in clinical status. S/p RHC that showed wedge of 26-32 with CVP of 15. Will continue diuresis with Lasix with the addition of metolazone and acetazolamide. Discussions had with nephrology about electrolyte abnormalities. GOC discussions underway.       Interval History: See above; worsening somnolence. Fluid status improving, but shortness of breath worsening.     Review of Systems   Constitutional:  Negative for chills, fatigue and fever.   HENT:  Negative for congestion, dental problem, facial swelling, postnasal drip, rhinorrhea, sore throat and trouble swallowing.    Eyes:  Negative for photophobia and pain.   Respiratory:  Negative for cough, choking, chest tightness, shortness of breath and wheezing.    Cardiovascular:  Positive for leg swelling. Negative for chest pain.   Gastrointestinal:  Positive for abdominal  pain. Negative for abdominal distention, diarrhea, nausea and vomiting.   Endocrine: Negative.    Genitourinary:  Negative for dysuria, flank pain, frequency and hematuria.   Musculoskeletal:  Positive for myalgias. Negative for back pain.   Skin:  Negative for pallor and rash.   Allergic/Immunologic: Negative.    Neurological:  Negative for dizziness, syncope, weakness, numbness and headaches.   Psychiatric/Behavioral:  The patient is not nervous/anxious.    Objective:     Vital Signs (Most Recent):  Temp: 96.7 °F (35.9 °C) (05/16/23 1221)  Pulse: 94 (05/16/23 1221)  Resp: 16 (05/16/23 1221)  BP: (!) 104/52 (05/16/23 1221)  SpO2: 98 % (05/16/23 1221) Vital Signs (24h Range):  Temp:  [96.7 °F (35.9 °C)-98.7 °F (37.1 °C)] 96.7 °F (35.9 °C)  Pulse:  [] 94  Resp:  [16-20] 16  SpO2:  [94 %-99 %] 98 %  BP: ()/(46-62) 104/52     Weight: 97.8 kg (215 lb 9.8 oz)  Body mass index is 35.88 kg/m².    Intake/Output Summary (Last 24 hours) at 5/16/2023 1250  Last data filed at 5/16/2023 0926  Gross per 24 hour   Intake 700 ml   Output 2950 ml   Net -2250 ml         Physical Exam  Vitals and nursing note reviewed.   Constitutional:       General: She is not in acute distress.     Appearance: Normal appearance. She is obese. She is not ill-appearing or diaphoretic.   HENT:      Head: Normocephalic and atraumatic.      Right Ear: External ear normal.      Left Ear: External ear normal.      Nose: Nose normal.      Mouth/Throat:      Mouth: Mucous membranes are moist.      Pharynx: Oropharynx is clear.   Eyes:      General: No scleral icterus.     Extraocular Movements: Extraocular movements intact.      Conjunctiva/sclera: Conjunctivae normal.      Pupils: Pupils are equal, round, and reactive to light.   Cardiovascular:      Rate and Rhythm: Normal rate and regular rhythm.      Pulses: Normal pulses.      Heart sounds: Normal heart sounds. No murmur heard.  Pulmonary:      Effort: Pulmonary effort is normal. No  respiratory distress.      Breath sounds: No stridor. No wheezing, rhonchi or rales.   Chest:      Chest wall: No tenderness.   Abdominal:      General: Abdomen is flat. There is no distension.      Palpations: Abdomen is soft.      Tenderness: There is no abdominal tenderness. There is no guarding or rebound.   Musculoskeletal:         General: Swelling present. No tenderness. Normal range of motion.      Cervical back: Normal range of motion and neck supple. No rigidity or tenderness.      Right lower leg: Edema present.      Left lower leg: Edema present.      Comments: Trigger finger R middle finger   Skin:     General: Skin is warm and dry.      Capillary Refill: Capillary refill takes less than 2 seconds.      Coloration: Skin is not jaundiced.      Findings: No erythema.   Neurological:      General: No focal deficit present.      Mental Status: She is alert and oriented to person, place, and time.      Cranial Nerves: No cranial nerve deficit.      Motor: No weakness.   Psychiatric:         Mood and Affect: Mood normal.         Behavior: Behavior normal.           Significant Labs: All pertinent labs within the past 24 hours have been reviewed.    Significant Imaging: I have reviewed all pertinent imaging results/findings within the past 24 hours.      Assessment/Plan:      * Acute on chronic combined systolic and diastolic heart failure  Last Echo 6/21 showing     The estimated ejection fraction is 55%.   The left ventricle is normal in size with normal systolic function.   Normal left ventricular diastolic function.   Normal right ventricular size with normal right ventricular systolic function.   Mild tricuspid regurgitation.   The estimated PA systolic pressure is 31 mmHg.   Normal central venous pressure (3 mmHg).    No echocardiogram results found for the past 14 days.  RHC  CVP 15  Wedge 25-32    Patient did not tolerate dobutamine, had chest pain. Dobutamine dc'd    -Now on lasix 240 IV q6h    -Metolazone 10 daily  -Acetazolamide 500 BID   -Repeat RHC in 2 days  -Antihypertensive. GDMT, Anticoagulation, and diuresis management per Nephrology and Cardiology in a multidisciplinary team  -Qq4 BMPs with electrolyte repletion as needed; goal K>4, Mg>2  - 1.5L fluid restriction, low Na diet  - continue GDMT when possible  -Tylenol 1g up to 3 times a day          Myalgia  Will continue to treat pain as needed      ROSLYN (acute kidney injury)  Pt with increased Cr to 4.7 from 2.0 baseline. Possibly due to renovascular congestion from volume overload.     -Kerns  -Strict Is/Os  -Avoid nephrotoxic agents  -Daily CMPs        Acute hypoxemic respiratory failure  Resolved    CKD (chronic kidney disease), stage IV  History of CKD. See ROSLYN.      Above-knee amputation of right lower extremity  Prior history consistent with exam      Uses self-applied continuous glucose monitoring device  POCT glucose and daily CMPs    -Restart insulin for hyperglycemia      Dermatitis associated with moisture  Large body habitus with dermatitis to folds. Will monitor for infection.    -Zinc based powder  -Topical abx if indicated      Status post above knee amputation, left  History of.      Hypoalbuminemia  Pt with poor PO intake reported. Pt eats one meal a day and has associated nausea and vomiting. Will try antiemetics before feeds to improve intake. Will monitor daily intake and calorie replacement.     -Calorie count  -Diabetic/Renal diet  -Antiemetics before meals; Zofran 4 IV PRN  -Monitor Qtc for prolongation with antiemetics      Uremia  Elevating BUN to 73. Worsening insomnia. Will discuss goals and plan with nephrology.       Impaired functional mobility and endurance  PT/OT for bedbound patient      Paroxysmal atrial fibrillation  Continue home rate control  NSR on EKG  Eliquis 5mg BID          Anemia  Asymptomatic. Higher than 8 months ago. No signs of acute bleed at this time. Haptoglobin elevated.    -CBC  daily  -Transfuse if <7        S/P CABG x 1  History of. Sternal scar well healed.       CAD (coronary artery disease)  History of CAD with retrosternal chest pain. EKG negative to ST changes. Nonspecific T wave inversions.     -Cardiac tele  -EKG prn  -Bidil per Cards recs  -Continue plavix, hold ASA    CAROLIN (generalized anxiety disorder)  Continue home anxiety medications      Essential hypertension  Continue home antihypertensives      Hypocalcemia  Ionized calcium at 0.76. Ca correcting at 8.4.     -Calcium acetate for calcium repletion and for phos binding.       PAD (peripheral artery disease)  History of bilateral AKA.           VTE Risk Mitigation (From admission, onward)         Ordered     apixaban tablet 5 mg  2 times daily         05/11/23 1329     IP VTE HIGH RISK PATIENT  Once         05/07/23 1547                Discharge Planning   DEVAN: 5/18/2023     Code Status: Full Code   Is the patient medically ready for discharge?: No    Reason for patient still in hospital (select all that apply): Patient trending condition, Treatment and Consult recommendations  Discharge Plan A: Home with family, Home Health   Discharge Delays: None known at this time              Onur Bueno MD  Department of Hospital Medicine   [facility] - [facility]iology Stepdown

## 2023-05-16 NOTE — PT/OT/SLP PROGRESS
"Occupational Therapy   Treatment    Name: Suyapa Connelly  MRN: [id number]  Admitting Diagnosis:  Acute on chronic combined systolic and diastolic heart failure  6 Days Post-Op    Recommendations:     Discharge Recommendations: other (see comments)  Discharge Equipment Recommendations:  drop arm commode, lift device, hospital bed  Barriers to discharge:  Other (Comment) (increased skilled A required)    Assessment:     Suyapa Connelly is a 57 y.o. female with a medical diagnosis of Acute on chronic combined systolic and diastolic heart failure.  She presents with difficulty with upper extremity exercises and bed mobility required to complete more advanced functional transfers. Performance deficits affecting function are weakness, impaired endurance, decreased upper extremity function, decreased lower extremity function, decreased ROM, impaired cardiopulmonary response to activity, impaired joint extensibility, decreased safety awareness, impaired self care skills, impaired functional mobility, impaired skin, edema, pain.     Rehab Prognosis:  Good; patient would benefit from acute skilled OT services to address these deficits and reach maximum level of function.       Plan:     Patient to be seen 4 x/week to address the above listed problems via self-care/home management, therapeutic activities, therapeutic exercises, neuromuscular re-education  Plan of Care Expires: 06/07/23  Plan of Care Reviewed with: patient, spouse    Subjective     Chief Complaint: Patient tearful during session as a combination of pain and frustration with reduced mobility  Patient/Family Comments/goals:  is concerned over ability to transfer to wheelchair   Pain/Comfort:  Pain Rating 1: other (see comments) ("extreme" but did not rate)  Location - Side 1: Bilateral  Location 1: other (see comments) (residual lower limbs)  Pain Addressed 1: Reposition, Distraction  Pain Rating Post-Intervention 1: other (see comments) (not " rated)    Objective:     Communicated with: nursing prior to session.  Patient found HOB elevated with telemetry, PureWick upon OT entry to room. Therapy tech in session for patient safety and additional assistance if needed. Patient's  and mother-in-law in room throughout session.    General Precautions: Standard, fall    Orthopedic Precautions:N/A  Braces: N/A  Respiratory Status: Room air     Occupational Performance:     Bed Mobility:    Patient completed Supine to Sit with HOB elevated with contact guard assistance with increased time required to reach edge of bed  Patient completed Sit to Supine with HOB elevated with contact guard assistance   Patient rolled L with minimal assistance  Patient sat EOB with SBA-CGA    Functional Mobility/Transfers:   Not attempted on this date due to decreased upper body strength limiting ability to safely complete sliding board transfer    Activities of Daily Living:  Grooming: contact guard assistance to brush teeth while sitting EOB    Therapeutic Activity:   1x5 triceps extension with ability to almost clear backside off of the bed with need for break activity activity due to reduced activity tolerance  1x5 scoot towards head of bed with triceps extension technique with inability to clear backside off the bed. Patient reports 8/10 rate of perceived exertion.       [facility] 6 Click ADL: 15    Treatment & Education:  Patient educated on continued UE strengthening and ROM activities outside of therapy to improve strength in order to complete functional tasks.    Patient educated on:   -purpose of OT and OT POC  -facilitation and education on proper body mechanics, energy conservation, and safety  -importance of early mobility and out of bed activities with staff assist  -overall benefits of therapy     Resting hand splint not donned after session due to difficulty with IV access point on forearm      Patient left right sidelying with all lines intact, call button in reach,  and family present    GOALS:   Multidisciplinary Problems       Occupational Therapy Goals          Problem: Occupational Therapy    Goal Priority Disciplines Outcome Interventions   Occupational Therapy Goal     OT, PT/OT Ongoing, Progressing    Description: Goals to be met by: 5/25     Patient will increase functional independence with ADLs by performing:    UE Dressing with Modified [place].  LE Dressing with Modified [place].  Sitting at edge of bed x10 minutes with Stand-by Assistance. - Met  Rolling to Bilateral with Stand-by Assistance.   Supine to sit with [place].                         Time Tracking:     OT Date of Treatment: 05/16/23  OT Start Time: 1042  OT Stop Time: 1112  OT Total Time (min): 30 min    Billable Minutes:Self Care/Home Management 10  Therapeutic Activity 20    OT/MACKENZIE: OT          5/16/2023

## 2023-05-17 LAB
ALBUMIN SERPL BCP-MCNC: 2.2 G/DL (ref 3.5–5.2)
ALLENS TEST: ABNORMAL
ALP SERPL-CCNC: 179 U/L (ref 55–135)
ALT SERPL W/O P-5'-P-CCNC: 7 U/L (ref 10–44)
ANION GAP SERPL CALC-SCNC: 16 MMOL/L (ref 8–16)
AST SERPL-CCNC: 22 U/L (ref 10–40)
BASOPHILS # BLD AUTO: 0.05 K/UL (ref 0–0.2)
BASOPHILS NFR BLD: 1 % (ref 0–1.9)
BILIRUB SERPL-MCNC: 0.3 MG/DL (ref 0.1–1)
BUN SERPL-MCNC: 78 MG/DL (ref 6–20)
CA-I BLDV-SCNC: 0.92 MMOL/L (ref 1.06–1.42)
CALCIUM SERPL-MCNC: 7 MG/DL (ref 8.7–10.5)
CHLORIDE SERPL-SCNC: 93 MMOL/L (ref 95–110)
CO2 SERPL-SCNC: 25 MMOL/L (ref 23–29)
CREAT SERPL-MCNC: 6.6 MG/DL (ref 0.5–1.4)
DELSYS: ABNORMAL
DIFFERENTIAL METHOD: ABNORMAL
EOSINOPHIL # BLD AUTO: 0.2 K/UL (ref 0–0.5)
EOSINOPHIL NFR BLD: 4 % (ref 0–8)
ERYTHROCYTE [DISTWIDTH] IN BLOOD BY AUTOMATED COUNT: 13.8 % (ref 11.5–14.5)
EST. GFR  (NO RACE VARIABLE): 6.9 ML/MIN/1.73 M^2
GLUCOSE SERPL-MCNC: 109 MG/DL (ref 70–110)
HCO3 UR-SCNC: 28.7 MMOL/L (ref 24–28)
HCT VFR BLD AUTO: 28.1 % (ref 37–48.5)
HGB BLD-MCNC: 8.5 G/DL (ref 12–16)
IMM GRANULOCYTES # BLD AUTO: 0.01 K/UL (ref 0–0.04)
IMM GRANULOCYTES NFR BLD AUTO: 0.2 % (ref 0–0.5)
LYMPHOCYTES # BLD AUTO: 1 K/UL (ref 1–4.8)
LYMPHOCYTES NFR BLD: 20.3 % (ref 18–48)
MCH RBC QN AUTO: 30.5 PG (ref 27–31)
MCHC RBC AUTO-ENTMCNC: 30.2 G/DL (ref 32–36)
MCV RBC AUTO: 101 FL (ref 82–98)
MONOCYTES # BLD AUTO: 0.7 K/UL (ref 0.3–1)
MONOCYTES NFR BLD: 14.4 % (ref 4–15)
NEUTROPHILS # BLD AUTO: 2.9 K/UL (ref 1.8–7.7)
NEUTROPHILS NFR BLD: 60.1 % (ref 38–73)
NRBC BLD-RTO: 0 /100 WBC
PCO2 BLDA: 49.4 MMHG (ref 35–45)
PH SMN: 7.37 [PH] (ref 7.35–7.45)
PLATELET # BLD AUTO: 384 K/UL (ref 150–450)
PMV BLD AUTO: 10.4 FL (ref 9.2–12.9)
PO2 BLDA: 55 MMHG (ref 40–60)
POC BE: 3 MMOL/L
POC SATURATED O2: 87 % (ref 95–100)
POC TCO2: 30 MMOL/L (ref 24–29)
POCT GLUCOSE: 111 MG/DL (ref 70–110)
POCT GLUCOSE: 115 MG/DL (ref 70–110)
POTASSIUM SERPL-SCNC: 3.5 MMOL/L (ref 3.5–5.1)
PROT SERPL-MCNC: 6.7 G/DL (ref 6–8.4)
RBC # BLD AUTO: 2.79 M/UL (ref 4–5.4)
SAMPLE: ABNORMAL
SITE: ABNORMAL
SODIUM SERPL-SCNC: 134 MMOL/L (ref 136–145)
TROPONIN I SERPL DL<=0.01 NG/ML-MCNC: 0.07 NG/ML (ref 0–0.03)
WBC # BLD AUTO: 4.78 K/UL (ref 3.9–12.7)

## 2023-05-17 PROCEDURE — 20600001 HC STEP DOWN PRIVATE ROOM

## 2023-05-17 PROCEDURE — 99233 PR SUBSEQUENT HOSPITAL CARE,LEVL III: ICD-10-PCS | Mod: GC,,, | Performed by: INTERNAL MEDICINE

## 2023-05-17 PROCEDURE — 25000003 PHARM REV CODE 250

## 2023-05-17 PROCEDURE — 93005 ELECTROCARDIOGRAM TRACING: CPT

## 2023-05-17 PROCEDURE — 25000003 PHARM REV CODE 250: Performed by: STUDENT IN AN ORGANIZED HEALTH CARE EDUCATION/TRAINING PROGRAM

## 2023-05-17 PROCEDURE — 93010 ELECTROCARDIOGRAM REPORT: CPT | Mod: ,,, | Performed by: INTERNAL MEDICINE

## 2023-05-17 PROCEDURE — 82803 BLOOD GASES ANY COMBINATION: CPT

## 2023-05-17 PROCEDURE — 99232 SBSQ HOSP IP/OBS MODERATE 35: CPT | Mod: ,,, | Performed by: INTERNAL MEDICINE

## 2023-05-17 PROCEDURE — 85025 COMPLETE CBC W/AUTO DIFF WBC: CPT | Performed by: HOSPITALIST

## 2023-05-17 PROCEDURE — 99233 SBSQ HOSP IP/OBS HIGH 50: CPT | Mod: GC,,, | Performed by: INTERNAL MEDICINE

## 2023-05-17 PROCEDURE — 25000003 PHARM REV CODE 250: Performed by: HOSPITALIST

## 2023-05-17 PROCEDURE — 25000003 PHARM REV CODE 250: Performed by: INTERNAL MEDICINE

## 2023-05-17 PROCEDURE — 84484 ASSAY OF TROPONIN QUANT: CPT

## 2023-05-17 PROCEDURE — 99900035 HC TECH TIME PER 15 MIN (STAT)

## 2023-05-17 PROCEDURE — 93010 EKG 12-LEAD: ICD-10-PCS | Mod: ,,, | Performed by: INTERNAL MEDICINE

## 2023-05-17 PROCEDURE — 97530 THERAPEUTIC ACTIVITIES: CPT

## 2023-05-17 PROCEDURE — 99232 PR SUBSEQUENT HOSPITAL CARE,LEVL II: ICD-10-PCS | Mod: ,,, | Performed by: INTERNAL MEDICINE

## 2023-05-17 PROCEDURE — 82330 ASSAY OF CALCIUM: CPT

## 2023-05-17 PROCEDURE — 36415 COLL VENOUS BLD VENIPUNCTURE: CPT

## 2023-05-17 PROCEDURE — 80053 COMPREHEN METABOLIC PANEL: CPT

## 2023-05-17 PROCEDURE — 94761 N-INVAS EAR/PLS OXIMETRY MLT: CPT

## 2023-05-17 PROCEDURE — 63600175 PHARM REV CODE 636 W HCPCS: Performed by: STUDENT IN AN ORGANIZED HEALTH CARE EDUCATION/TRAINING PROGRAM

## 2023-05-17 RX ORDER — CALCIUM GLUCONATE 20 MG/ML
3 INJECTION, SOLUTION INTRAVENOUS
Status: ACTIVE | OUTPATIENT
Start: 2023-05-17 | End: 2023-05-17

## 2023-05-17 RX ORDER — CALCIUM GLUCONATE 20 MG/ML
1 INJECTION, SOLUTION INTRAVENOUS
Status: DISPENSED | OUTPATIENT
Start: 2023-05-17 | End: 2023-05-17

## 2023-05-17 RX ORDER — CALCITRIOL 0.25 UG/1
1 CAPSULE ORAL DAILY
Status: DISCONTINUED | OUTPATIENT
Start: 2023-05-18 | End: 2023-05-20

## 2023-05-17 RX ORDER — POTASSIUM CHLORIDE 750 MG/1
30 CAPSULE, EXTENDED RELEASE ORAL
Status: COMPLETED | OUTPATIENT
Start: 2023-05-17 | End: 2023-05-17

## 2023-05-17 RX ADMIN — FUROSEMIDE 240 MG: 10 INJECTION, SOLUTION INTRAMUSCULAR; INTRAVENOUS at 09:05

## 2023-05-17 RX ADMIN — ATORVASTATIN CALCIUM 80 MG: 40 TABLET, FILM COATED ORAL at 09:05

## 2023-05-17 RX ADMIN — HYDRALAZINE HYDROCHLORIDE 75 MG: 50 TABLET ORAL at 09:05

## 2023-05-17 RX ADMIN — ACETAMINOPHEN 1000 MG: 500 TABLET ORAL at 09:05

## 2023-05-17 RX ADMIN — CALCIUM ACETATE 667 MG: 667 CAPSULE ORAL at 01:05

## 2023-05-17 RX ADMIN — FUROSEMIDE 240 MG: 10 INJECTION, SOLUTION INTRAMUSCULAR; INTRAVENOUS at 01:05

## 2023-05-17 RX ADMIN — CARVEDILOL 6.25 MG: 6.25 TABLET, FILM COATED ORAL at 09:05

## 2023-05-17 RX ADMIN — POTASSIUM CHLORIDE 30 MEQ: 10 CAPSULE, COATED, EXTENDED RELEASE ORAL at 05:05

## 2023-05-17 RX ADMIN — ISOSORBIDE DINITRATE 40 MG: 20 TABLET ORAL at 09:05

## 2023-05-17 RX ADMIN — CALCIUM GLUCONATE 1 G: 20 INJECTION, SOLUTION INTRAVENOUS at 10:05

## 2023-05-17 RX ADMIN — METOLAZONE 10 MG: 5 TABLET ORAL at 09:05

## 2023-05-17 RX ADMIN — FUROSEMIDE 240 MG: 10 INJECTION, SOLUTION INTRAMUSCULAR; INTRAVENOUS at 02:05

## 2023-05-17 RX ADMIN — CALCIUM ACETATE 667 MG: 667 CAPSULE ORAL at 05:05

## 2023-05-17 RX ADMIN — HYDRALAZINE HYDROCHLORIDE 75 MG: 50 TABLET ORAL at 02:05

## 2023-05-17 RX ADMIN — CALCIUM GLUCONATE 1 G: 20 INJECTION, SOLUTION INTRAVENOUS at 09:05

## 2023-05-17 RX ADMIN — AMITRIPTYLINE HYDROCHLORIDE 50 MG: 25 TABLET, FILM COATED ORAL at 09:05

## 2023-05-17 RX ADMIN — ACETAZOLAMIDE 500 MG: 250 TABLET ORAL at 09:05

## 2023-05-17 RX ADMIN — CALCIUM ACETATE 667 MG: 667 CAPSULE ORAL at 09:05

## 2023-05-17 RX ADMIN — PANTOPRAZOLE SODIUM 40 MG: 40 TABLET, DELAYED RELEASE ORAL at 09:05

## 2023-05-17 RX ADMIN — APIXABAN 5 MG: 5 TABLET, FILM COATED ORAL at 09:05

## 2023-05-17 RX ADMIN — INSULIN DETEMIR 8 UNITS: 100 INJECTION, SOLUTION SUBCUTANEOUS at 09:05

## 2023-05-17 RX ADMIN — FUROSEMIDE 240 MG: 10 INJECTION, SOLUTION INTRAMUSCULAR; INTRAVENOUS at 04:05

## 2023-05-17 RX ADMIN — ALLOPURINOL 100 MG: 100 TABLET ORAL at 09:05

## 2023-05-17 RX ADMIN — CLOPIDOGREL BISULFATE 75 MG: 75 TABLET ORAL at 09:05

## 2023-05-17 RX ADMIN — SERTRALINE HYDROCHLORIDE 50 MG: 50 TABLET ORAL at 09:05

## 2023-05-17 RX ADMIN — POTASSIUM CHLORIDE 30 MEQ: 10 CAPSULE, COATED, EXTENDED RELEASE ORAL at 01:05

## 2023-05-17 RX ADMIN — CALCITRIOL CAPSULES 0.25 MCG 0.25 MCG: 0.25 CAPSULE ORAL at 09:05

## 2023-05-17 RX ADMIN — ISOSORBIDE DINITRATE 40 MG: 20 TABLET ORAL at 02:05

## 2023-05-17 NOTE — PT/OT/SLP PROGRESS
"Occupational Therapy   Treatment    Name: Suyapa Connelly  MRN: [id number]  Admitting Diagnosis:  Acute on chronic combined systolic and diastolic heart failure  7 Days Post-Op    Recommendations:     Discharge Recommendations: other (see comments)  Discharge Equipment Recommendations:  drop arm commode, lift device, hospital bed  Barriers to discharge:  Other (Comment) (level of skilled A needed)    Assessment:     Suyapa Connelly is a 57 y.o. female with a medical diagnosis of Acute on chronic combined systolic and diastolic heart failure.  She presents with continued difficulty with functional activities due to fluid overload, pain, and decreased upper extremity strength. Bed mobility continues to cause patient extreme pain and promotes emotional lability. Performance deficits affecting function are weakness, impaired endurance, impaired sensation, decreased coordination, impaired self care skills, decreased upper extremity function, impaired functional mobility, gait instability, impaired cardiopulmonary response to activity.     Rehab Prognosis:  Fair; patient would benefit from acute skilled OT services to address these deficits and reach maximum level of function.       Plan:     Patient to be seen 4 x/week to address the above listed problems via self-care/home management, therapeutic activities, therapeutic exercises, neuromuscular re-education  Plan of Care Expires: 06/07/23  Plan of Care Reviewed with: patient    Subjective     Chief Complaint: "I don't want to be this kind of person"; pain and sensitivity of B residual limbs and stomach pain  Patient/Family Comments/goals:   Pain/Comfort:  Pain Rating 1: 10/10  Location 1:  (stomach and B legs)    Objective:     Communicated with: nursing prior to session.  Patient found HOB elevated with peripheral IV, PureWick, telemetry upon OT entry to room.    General Precautions: Standard, fall    Orthopedic Precautions:N/A  Braces: N/A  Respiratory Status: Room " air     Occupational Performance:     Bed Mobility:    Patient completed Rolling/Turning to Left with  contact guard assistance and with side rail  Patient completed Rolling/Turning to Right with contact guard assistance and with side rail  Patient completed Scooting/Bridging with contact guard assistance with increased time   Patient completed Supine to Sit with minimum assistance and with side rail  Patient completed Sit to Supine with contact guard assistance and with side rail     Functional Mobility/Transfers:  In preparation for functional transfers and to build upper extremity strength, patient completed 5 reps of triceps extension with cueing on hand placement. Improved clearing off bed compared to previous session.   Transfers and functional mobility on this date due to patient's decreased upper extremity strength    Activities of Daily Living: patient declined ADLs on this date    [facility] 6 Click ADL: 14    Treatment & Education:    Patient educated on:   -purpose of OT and OT POC  -facilitation and education on proper body mechanics, energy conservation, and safety  -importance of early mobility and out of bed activities with staff assist  -overall benefits of therapy       Patient left HOB elevated with all lines intact, call button in reach, and PCT present    GOALS:   Multidisciplinary Problems       Occupational Therapy Goals          Problem: Occupational Therapy    Goal Priority Disciplines Outcome Interventions   Occupational Therapy Goal     OT, PT/OT Ongoing, Progressing    Description: Goals to be met by: 5/25     Patient will increase functional independence with ADLs by performing:    UE Dressing with Modified Page.  LE Dressing with Modified Page.  Sitting at edge of bed x10 minutes with Stand-by Assistance. - Met  Rolling to Bilateral with Stand-by Assistance.   Supine to sit with Page.                         Time Tracking:     OT Date of Treatment: 05/17/23  OT Start  Time: 1456  OT Stop Time: 1528  OT Total Time (min): 32 min    Billable Minutes:Therapeutic Activity 32    OT/MACKENZIE: OT          5/17/2023

## 2023-05-17 NOTE — PLAN OF CARE
Recommendations  1. Rec'd liberalizing to regular diet (d/t decreased intake)   2. Continue Optisource for optimization of protein and calorie intake- Boost discontinued as pt reports diarrhea  3. RD following     Goals: Meet % of EEN/EPN by RD f/u date  Nutrition Goal Status: goal not met  Communication of RD Recs: POC

## 2023-05-17 NOTE — PROGRESS NOTES
"[facility] - Cardiology Stepdown  Adult Nutrition  Progress Note    SUMMARY       Recommendations  1. Rec'd liberalizing to regular diet (d/t decreased intake)   2. Continue Optisource for optimization of protein and calorie intake- Boost discontinued as pt reports diarrhea  3. RD following    Goals: Meet % of EEN/EPN by RD f/u date  Nutrition Goal Status: goal not met  Communication of RD Recs: POC    Assessment and Plan    Nutrition Problem  Inadequate energy intake     Related to (etiology):   Physiological demands     Signs and Symptoms (as evidenced by):   25% meal consumption     Interventions(treatment strategy):  Collaboration of nutrition care w/ other providers     Nutrition Diagnosis Status:   New     Reason for Assessment    Reason For Assessment: LOS  Diagnosis: cardiac disease  Relevant Medical History: GERD, HTN, DM type 2  Interdisciplinary Rounds: did not attend  General Information Comments: 5/17/23:  RD triggered for LOS. Spoke w/ pt at bedside, pt reports a poor appetite at this time, consuming about 25% meal consumption, some meals 50-75% meal consumption, depending on what is offered. Pt is receiving Boost however not drinking as she states it causes her diarrhea. RD encouraged adequate po intake at bedside. NFPE completed 5/17/23, pt is at risk for malnutrition if po intake continues to decline. LBM noted-5/14/23  Nutrition Discharge Planning: pending clinical course    Nutrition Risk Screen    Nutrition Risk Screen: no indicators present    Nutrition/Diet History    Spiritual, Cultural Beliefs, Caodaism Practices, Values that Affect Care: no  Food Allergies: NKFA    Anthropometrics    Temp: 97.8 °F (36.6 °C)  Height: 5' 5" (165.1 cm)  Height (inches): 65 in  Weight Method: Bed Scale  Weight: 97.8 kg (215 lb 9.8 oz)  Weight (lb): 215.61 lb  Ideal Body Weight (IBW), Female: 125 lb  % Ideal Body Weight, Female (lb): 168 %  BMI (Calculated): 35.9  BMI Grade: 35 - 39.9 - obesity - grade II   "     Lab/Procedures/Meds    Pertinent Labs Reviewed: reviewed  Pertinent Labs Comments: Sodium 134, BUN 78, Cr 6.6, GFR 6.9  Pertinent Medications Reviewed: reviewed  Pertinent Medications Comments: lasix      Estimated/Assessed Needs    Weight Used For Calorie Calculations: 97.8 kg (215 lb 9.8 oz)  Energy Calorie Requirements (kcal): 1956  Energy Need Method: Kcal/kg (20 kcal/kg)  Protein Requirements: 117 g (1.2 g/kg)  Weight Used For Protein Calculations: 97.8 kg (215 lb 9.8 oz)     RDA Method (mL): 1956         Nutrition Prescription Ordered    Current Diet Order: Cardiac diet    Evaluation of Received Nutrient/Fluid Intake    I/O: -18.1 L since 5/3  Energy Calories Required: not meeting needs  Protein Required: not meeting needs  Fluid Required: not meeting needs  Total Fluid Intake (mL/kg): 1 ml or fluid per MD  Tolerance: tolerating  % Intake of Estimated Energy Needs: 0 - 25 %  % Meal Intake: 0 - 25 %    Nutrition Risk    Level of Risk/Frequency of Follow-up: low ((1x/week))     Monitor and Evaluation    Food and Nutrient Intake: energy intake, food and beverage intake  Food and Nutrient Adminstration: diet order  Knowledge/Beliefs/Attitudes: food and nutrition knowledge/skill, beliefs and attitudes  Physical Activity and Function: nutrition-related ADLs and IADLs, factors affecting access to physical activity  Anthropometric Measurements: height/length, weight, weight change, body mass index, growth pattern indices/percentile ranks  Biochemical Data, Medical Tests and Procedures: electrolyte and renal panel, gastrointestinal profile, inflammatory profile, glucose/endocrine profile, lipid profile  Nutrition-Focused Physical Findings: overall appearance, extremities, muscles and bones, head and eyes, skin     Nutrition Follow-Up    RD Follow-up?: Yes

## 2023-05-17 NOTE — PROGRESS NOTES
[facility]  Psychology  Consult Note      PROGRESS NOTE - INTERVENTION  Patient Name: Suyapa Connelly  MRN: [id number]  Date: 05/17/2023  Admission Date: 5/1/2023   Length of Stay: Hospital Day: 17   Attending Physician: Ruma Schulte MD        HISTORY OF PRESENTING ILLNESS: 57 y.o. female with past medical history of CAD s/p CABG, CKD, DM, HTN, HLD, s/p bilateral AKA who presnets with chest pain, difficulty breathing, leg swelling.      BRIEF ASSESSMENT  Mood: Low mood lasting most of the day, most days of the week. Patient reports that her mood has gotten worse over the past month. Endorsed anhedonia, feelings of hopelessness and helplessness, changes in sleep and appetite, difficulty concentrating, and retardation in movement. Denied S/I.   Anxiety: Excessive worry, difficulty controlling worry especially around her fears of loss of independence and struggles with a change in her identity.    Pain: Improved pain due to decrease in the swelling in her legs.   Sleep: Patient reported difficulty with sleep onset, nighttime awakenings, and early awakenings. Patient reports that she tends to sleep more during the day than the night.   Appetite: Limited.     INTERVENTION  Intervention Type: Motivational Interviewing  Session Content: Clinician used motivational interviewing to help evoke worries from patient and articulate her fears about recovery. Clinician and patient identified positive coping strategies she was already using, while normalizing her distress over the loss of her independence. 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 Clinician and patient discussed the use of an acceptance based attitude that made space both for her grief over the loss of her independence  without judging herself for that grief, as well as the recognition of the skills and strategies she had at her employ to improve her condition.     MENTAL STATUS EXAM & OBSERVATIONS  Appearance:  Good grooming and hygiene. Dressed in hospital gown. Appeared stated age.      Orientation: Oriented x 4.   Speech: Normal rate, volume, and prosody.    Thought Processes: Logical and goal-oriented.      Thought Content: Normal. No suicidal or homicidal ideation. No indications of obsessions or delusions.   Mood: Dysphoric  Anxious.   Affect: Full range, congruent with mood and session content..   Attention & Concentration: Intact. No deficits noted.   Insight: Good   Judgment: Good.   Behavioral Observations: Cooperative and engaged. Laying in bed. Good eye contact. No signs of psychomotor agitation or retardation.     DIAGNOSTIC IMPRESSION & PLAN  Patient Active Problem List   Diagnosis    Peripheral vascular disease    PAD (peripheral artery disease)    Hypocalcemia    Essential hypertension    Vitamin D deficiency    CAROLIN (generalized anxiety disorder)    Mixed hyperlipidemia    CAD (coronary artery disease)    S/P CABG x 1    Anemia    Paroxysmal atrial fibrillation    Hyponatremia    Critical lower limb ischemia    S/P femoral-popliteal bypass surgery    Thrombosis of femoro-popliteal bypass graft    Impaired functional mobility and endurance    Nephrotic syndrome    Uremia    Vascular graft infection    Hypoalbuminemia    Acute on chronic combined systolic and diastolic heart failure    Postmenopausal bleeding    Uterine fibroid    Muscle wasting and atrophy, not elsewhere classified, right thigh     Adjustment disorder with mixed anxiety and depressed mood    Phantom limb syndrome with pain    Status post above knee amputation, left    Peripheral neuropathic pain    Impaired eye movement    Myoclonus    Dermatitis associated with moisture    Uses self-applied continuous glucose monitoring device    Status post  above-knee amputation of both lower extremities    Leg swelling    Hyperkalemia    Debility    Metabolic acidosis    Above-knee amputation of right lower extremity    CKD (chronic kidney disease), stage IV    Acute hypoxemic respiratory failure    ROSLYN (acute kidney injury)    Myalgia       Impression: 57 y.o. female with past medical history of CAD s/p CABG, CKD, DM, HTN, HLD, s/p bilateral AKA who presnets with chest pain, difficulty breathing, leg swelling. Psychology was consulted to assess for depression and help patient manage her mood in regards to her current state of health. Patient meets the criteria for MDD. Patient's mood has not appeared to worsen since admission and first consultation (5/3/23). Clinician used motivational interviewing to help patient identify her specific fears over being reliant on other people to use the bathroom and the adjustment to seeing herself as someone who needs lots of support. Clinician helped patient normalize those anxieties and seek balance in acknowledging those thoughts while also identifying all the strategies and skills she was using to adjust to her situation and regain some of her lost independence.     Plan: Psychology will continue to follow.     Recommendations  - Provided resources for outpatient psychotherapy       Thank you for the opportunity to participate in this patient's care.      Length of Service: 23 minutes    Aubrey Neil   Psychology Resident   Dept. of Psychiatry  [facility]

## 2023-05-17 NOTE — PT/OT/SLP PROGRESS
Physical Therapy      Patient Name:  Suyapa Connelly   MRN:  [id number]    Patient not seen today secondary to Other (Comment) (xray in room. unable to return for second attempt). Will follow-up next day.

## 2023-05-17 NOTE — PLAN OF CARE
Problem: Adult Inpatient Plan of Care  Goal: Plan of Care Review  Outcome: Ongoing, Progressing  Goal: Patient-Specific Goal (Individualized)  Outcome: Ongoing, Progressing  Goal: Absence of Hospital-Acquired Illness or Injury  Outcome: Ongoing, Progressing  Goal: Optimal Comfort and Wellbeing  Outcome: Ongoing, Progressing  Goal: Readiness for Transition of Care  Outcome: Ongoing, Progressing     Problem: Fluid and Electrolyte Imbalance (Acute Kidney Injury/Impairment)  Goal: Fluid and Electrolyte Balance  Outcome: Ongoing, Progressing     Problem: Oral Intake Inadequate (Acute Kidney Injury/Impairment)  Goal: Optimal Nutrition Intake  Outcome: Ongoing, Progressing     Problem: Renal Function Impairment (Acute Kidney Injury/Impairment)  Goal: Effective Renal Function  Outcome: Ongoing, Progressing     Problem: Skin Injury Risk Increased  Goal: Skin Health and Integrity  Outcome: Ongoing, Progressing     Problem: Impaired Wound Healing  Goal: Optimal Wound Healing  Outcome: Ongoing, Progressing     Problem: Fall Injury Risk  Goal: Absence of Fall and Fall-Related Injury  Outcome: Ongoing, Progressing

## 2023-05-17 NOTE — PROGRESS NOTES
[facility] - Cardiology Stepdown  Nephrology  Progress Note    Patient Name: Suyapa Connelly  MRN: [id number]  Admission Date: 5/1/2023  Hospital Length of Stay: 14 days  Attending Provider: Ruma Schulte MD   Primary Care Physician: Magen Christensen MD  Principal Problem:Acute on chronic combined systolic and diastolic heart failure    Subjective:     HPI: 57 y.o. female with past medical history of CAD s/p CABG, CKD, DM, HTN, HLD, s/p bilateral AKA who presnets with chest pain, difficulty breathing, leg swelling. Her most recent ECHO revealed severe systolic dysfunction with EF of 15%. She has been started on diuretics with IV lasix and PO metolazone with adequate urinary response however her renal function has been deteriorating throughout the admission. She reports requiring dialysis at one time before, around the time she underwent her AKAs. Her baseline serum creatinine is in the 2s and it has trended up to 5 during this admission. Nephrology consulted for further evaluation of ROSLYN on CKD.         Interval History: Seen and evaluated by bedside. Serum creatinine at 6.6 this AM. UOP of 2.4L over the past 24h while on diuretic regimen.     Review of patient's allergies indicates:   Allergen Reactions    Ciprofloxacin Itching    Contrast media      Kidney injury    Iodine      Kidney injury     Current Facility-Administered Medications   Medication Frequency    acetaminophen tablet 1,000 mg Q6H PRN    acetaZOLAMIDE tablet 500 mg BID    allopurinol split tablet 100 mg Daily    amitriptyline tablet 50 mg QHS    apixaban tablet 5 mg BID    atorvastatin tablet 80 mg Daily    carvediloL tablet 6.25 mg BID    clopidogreL tablet 75 mg Daily    dextrose 10% bolus 125 mL 125 mL PRN    dextrose 10% bolus 250 mL 250 mL PRN    dextrose 40 % gel 15,000 mg PRN    dextrose 40 % gel 30,000 mg PRN    furosemide (LASIX) 240 mg in dextrose 5 % (D5W) 50 mL IVPB Q6H    glucagon (human recombinant) injection 1 mg PRN    hydrALAZINE  tablet 75 mg TID    insulin aspart U-100 pen 0-5 Units QID (AC + HS) PRN    insulin detemir U-100 (Levemir) pen 8 Units QHS    isosorbide dinitrate tablet 40 mg TID    melatonin tablet 6 mg Nightly PRN    metOLazone tablet 10 mg Daily    naloxone 0.4 mg/mL injection 0.02 mg PRN    ondansetron disintegrating tablet 8 mg Q8H PRN    pantoprazole EC tablet 40 mg Daily    sertraline tablet 50 mg Daily    sodium chloride 0.9% flush 10 mL Q12H PRN    sodium chloride 0.9% flush 10 mL PRN       Objective:     Vital Signs (Most Recent):  Temp: 97.9 °F (36.6 °C) (05/16/23 0618)  Pulse: 90 (05/16/23 0618)  Resp: 16 (05/16/23 0618)  BP: (!) 125/53 (05/16/23 0618)  SpO2: 97 % (05/16/23 0618) Vital Signs (24h Range):  Temp:  [97.8 °F (36.6 °C)-98.7 °F (37.1 °C)] 97.9 °F (36.6 °C)  Pulse:  [] 90  Resp:  [16-20] 16  SpO2:  [94 %-99 %] 97 %  BP: ()/(46-62) 125/53     Weight: 97.8 kg (215 lb 9.8 oz) (05/16/23 0421)  Body mass index is 35.88 kg/m².  Body surface area is 2.12 meters squared.    I/O last 3 completed shifts:  In: 880 [P.O.:880]  Out: 3300 [Urine:3300]    Physical Exam  Vitals and nursing note reviewed.   Constitutional:       General: She is not in acute distress.     Appearance: Normal appearance. She is obese. She is not ill-appearing or diaphoretic.   HENT:      Head: Normocephalic and atraumatic.      Right Ear: External ear normal.      Left Ear: External ear normal.      Nose: Nose normal.      Mouth/Throat:      Mouth: Mucous membranes are moist.      Pharynx: Oropharynx is clear.   Eyes:      General: No scleral icterus.     Extraocular Movements: Extraocular movements intact.      Conjunctiva/sclera: Conjunctivae normal.      Pupils: Pupils are equal, round, and reactive to light.   Cardiovascular:      Rate and Rhythm: Normal rate and regular rhythm.      Pulses: Normal pulses.      Heart sounds: Normal heart sounds. No murmur heard.  Pulmonary:      Effort: Pulmonary effort is normal. No respiratory  distress.      Breath sounds: Normal breath sounds. No stridor. No wheezing or rhonchi.      Comments: On NC  Abdominal:      General: Abdomen is flat. There is no distension.      Palpations: Abdomen is soft.      Tenderness: There is no abdominal tenderness. There is no guarding.   Musculoskeletal:         General: Swelling present. No tenderness. Normal range of motion.      Cervical back: Normal range of motion and neck supple. No rigidity or tenderness.      Right lower leg: Edema present.      Left lower leg: Edema present.      Comments: Trigger finger R middle finger  BL AKA stump edema +2   Skin:     General: Skin is warm and dry.      Capillary Refill: Capillary refill takes less than 2 seconds.      Coloration: Skin is not jaundiced.      Findings: No erythema.   Neurological:      General: No focal deficit present.      Mental Status: She is alert and oriented to person, place, and time.   Psychiatric:         Mood and Affect: Mood normal.         Behavior: Behavior normal.        Significant Labs:  CBC:   Recent Labs   Lab 05/16/23  0519   WBC 5.25   RBC 2.90*   HGB 9.0*   HCT 29.8*      *   MCH 31.0   MCHC 30.2*       CMP:   Recent Labs   Lab 05/16/23  0519   *   CALCIUM 6.5*   ALBUMIN 2.2*   PROT 6.5      K 3.7   CO2 27   CL 95   BUN 73*   CREATININE 6.3*   ALKPHOS 194*   ALT 10   AST 25   BILITOT 0.2            Assessment/Plan:     Cardiac/Vascular  * Acute on chronic combined systolic and diastolic heart failure  Management per primary       S/P CABG x 1  Management per primary       Renal/  ROSLYN (acute kidney injury)  ROSLYN on CKD, likely ischemic, CRS and diuresis  Baseline serum creatinine in the 2s, up to 3.9 on admission; up to 6.3 on her most recent labs  Adequate urine output while on diuretics; has made 2.4L over the past 24h  Retroperitoneal US with no hydronephrosis    ECHO with severe systolic dysfunction, EF of 15%    RHC with PCWP of 30   Continue diuresis for  additional volume removal; continue lasix to 240mg iv q6h, metolazone to 10mg po daily and acetazolamide 500mg BID; can start spironolactone 50mg daily   Consider repeating RHC during this admission to assess response to diuresis   Pt remains at high risk of requiring hemodialysis again in the near future ir persistent deterioration of her renal function   Dose medications to GFR    Avoid nephrotoxic agents as much as possible              Jeremy Juan MD  Nephrology  [facility] - Cardiology Stepdown    ATTENDING PHYSICIAN ATTESTATION  I have personally verified the history and examined the patient. I thoroughly reviewed the demographic, clinical, laboratorial and imaging information available in medical records. I agree with the assessment and recommendations provided by the subspecialty resident who was under my supervision.

## 2023-05-17 NOTE — PROGRESS NOTES
[facility] Medicine  Progress Note    Patient Name: Suyapa Connelly  MRN: [id number]  Patient Class: IP- Inpatient   Admission Date: 5/1/2023  Length of Stay: 14 days  Attending Physician: Ruma Schulte MD  Primary Care Provider: Magen Christensen MD        Subjective:     Principal Problem:Acute on chronic combined systolic and diastolic heart failure        HPI:  57 y.o. female with past medical history of CAD s/p CABG, CKD, DM, HTN, HLD, s/p bilateral AKA who presnets with chest pain, difficulty breathing, leg swelling.     Per ED She has been unable to access any of her medications for the last 3 months. Over the last few months she has noted worsening shortness of breath, particulatly orthopnea, and leg swelling. In the last two weeks she endorses associated abdominal pain and swelling, reduced appetite, and pleuritic chest pain with deep inspiration. She endorses occasional lighthededness with changes in position. Last week she had an episode where she was unable to breathe for about two minutes which was very upsetting for her and her , but she recovered and did not seek care at that time. Today, she complains of significant difficulty breathing, swelling in her legs and abdomen, and reduced appetite.     No headaches, visual changes, URI symptoms, palpitations, nausea, vomiting, diarrhea, blood in her stool, dysuria, hematuria, numbness or weakness in her extremities. She does endorse occasional paresthesias associated with her chronic phantom limb pain. She additionally has had increased difficulties with depression recently, denies suicidal or homicidal ideation, but has had considerable stress and low mood.      Of note, the patient saw a new primary care provider 4/21 for similar concerns who recommended she proceed to the ED at that time, but they were unable due to financial and geographic concerns. She lives two hours away from [facility] and her  cares for her  and elderly mother with dementia. She has had decreased access to medical care over the last year, and unable to access most of her prescriptions. She had a 90 day supply of four medications (lasix, statin, clopidogrel, sertraline) but these were accidentally lost and she has been unable to afford a replacement. They present today for evaluation and to reestablish care and access to her prescriptions.     Labs on admission significant for an elevated BNP, hypocalcemia which corrected is 8.3, significant elevation in Creatinine and a slight elevation of troponin    CXR  Cardiomegaly with pulmonary interstitial edema, suggestive of pulmonary edema secondary to CHF.      Overview/Hospital Course:  Diuresis initiated with Lasix IV.  1.2L UOP in the past 24 hours.  Vitals reassuring with adequate sats on ambient air.  Still very edematious; Cardiology recs lasix gtt.  GDMT initiated. Pt given 2mg morphine for moderate to severe pain in legs from swelling. Diuresis increased to 40mg/hr due to inadequate urine output. Cr continued to increase. Cr stable at new Lasix rate. Diuresed 1.9L with some improvement in clinical status. S/p RHC that showed wedge of 26-32 with CVP of 15. Will continue diuresis with Lasix with the addition of metolazone and acetazolamide. Discussions had with nephrology about electrolyte abnormalities. GOC discussions underway.       Interval History: Pt having pretty severe pain in her legs and lower abdomen this morning. Pt looks more ill than days prior. More somnolent. Pt still diuresing well. Repeating calcium labs. No perioral numbness.     Review of Systems   Constitutional:  Negative for chills, fatigue and fever.   HENT:  Negative for congestion, dental problem, facial swelling, postnasal drip, rhinorrhea, sore throat and trouble swallowing.    Eyes:  Negative for photophobia and pain.   Respiratory:  Negative for cough, choking, chest tightness, shortness of breath and wheezing.     Cardiovascular:  Positive for leg swelling. Negative for chest pain.   Gastrointestinal:  Positive for abdominal pain. Negative for abdominal distention, diarrhea, nausea and vomiting.   Endocrine: Negative.    Genitourinary:  Negative for dysuria, flank pain, frequency and hematuria.   Musculoskeletal:  Positive for myalgias. Negative for back pain.   Skin:  Negative for pallor and rash.   Allergic/Immunologic: Negative.    Neurological:  Negative for dizziness, syncope, weakness, numbness and headaches.   Psychiatric/Behavioral:  The patient is not nervous/anxious.    Objective:     Vital Signs (Most Recent):  Temp: 97.8 °F (36.6 °C) (05/17/23 1213)  Pulse: 88 (05/17/23 1213)  Resp: 17 (05/17/23 1213)  BP: (!) 107/49 (05/17/23 1213)  SpO2: (!) 93 % (05/17/23 1213) Vital Signs (24h Range):  Temp:  [97.8 °F (36.6 °C)-98.3 °F (36.8 °C)] 97.8 °F (36.6 °C)  Pulse:  [87-94] 88  Resp:  [16-18] 17  SpO2:  [93 %-98 %] 93 %  BP: (107-121)/(49-58) 107/49     Weight: 97.8 kg (215 lb 9.8 oz)  Body mass index is 35.88 kg/m².    Intake/Output Summary (Last 24 hours) at 5/17/2023 1412  Last data filed at 5/17/2023 0915  Gross per 24 hour   Intake 666 ml   Output 1850 ml   Net -1184 ml         Physical Exam  Vitals and nursing note reviewed.   Constitutional:       General: She is not in acute distress.     Appearance: Normal appearance. She is obese. She is not ill-appearing or diaphoretic.   HENT:      Head: Normocephalic and atraumatic.      Right Ear: External ear normal.      Left Ear: External ear normal.      Nose: Nose normal.      Mouth/Throat:      Mouth: Mucous membranes are moist.      Pharynx: Oropharynx is clear.   Eyes:      General: No scleral icterus.     Extraocular Movements: Extraocular movements intact.      Conjunctiva/sclera: Conjunctivae normal.      Pupils: Pupils are equal, round, and reactive to light.   Cardiovascular:      Rate and Rhythm: Normal rate and regular rhythm.      Pulses: Normal pulses.       Heart sounds: Normal heart sounds. No murmur heard.  Pulmonary:      Effort: Pulmonary effort is normal. No respiratory distress.      Breath sounds: No stridor. No wheezing, rhonchi or rales.   Chest:      Chest wall: No tenderness.   Abdominal:      General: Abdomen is flat. There is no distension.      Palpations: Abdomen is soft.      Tenderness: There is no abdominal tenderness. There is no guarding or rebound.   Musculoskeletal:         General: Swelling present. No tenderness. Normal range of motion.      Cervical back: Normal range of motion and neck supple. No rigidity or tenderness.      Right lower leg: Edema present.      Left lower leg: Edema present.      Comments: Trigger finger R middle finger   Skin:     General: Skin is warm and dry.      Capillary Refill: Capillary refill takes less than 2 seconds.      Coloration: Skin is not jaundiced.      Findings: No erythema.   Neurological:      General: No focal deficit present.      Mental Status: She is alert and oriented to person, place, and time.      Cranial Nerves: No cranial nerve deficit.      Motor: No weakness.   Psychiatric:         Mood and Affect: Mood normal.         Behavior: Behavior normal.           Significant Labs: All pertinent labs within the past 24 hours have been reviewed.    Significant Imaging: I have reviewed all pertinent imaging results/findings within the past 24 hours.      Assessment/Plan:      * Acute on chronic combined systolic and diastolic heart failure  Last Echo 6/21 showing     The estimated ejection fraction is 55%.   The left ventricle is normal in size with normal systolic function.   Normal left ventricular diastolic function.   Normal right ventricular size with normal right ventricular systolic function.   Mild tricuspid regurgitation.   The estimated PA systolic pressure is 31 mmHg.   Normal central venous pressure (3 mmHg).    No echocardiogram results found for the past 14 days.  RHC  CVP  15  Wedge 25-32    Patient did not tolerate dobutamine, had chest pain. Dobutamine dc'd    -Now on lasix 240 IV q6h   -Metolazone 10 daily  -Acetazolamide 500 BID   -Repeat RHC in 2 days  -VBG  -Repeat iCalcium  -Replete K  -EKG  -Troponin  -CXR  -Antihypertensive. GDMT, Anticoagulation, and diuresis management per Nephrology and Cardiology in a multidisciplinary team  -Qq4 BMPs with electrolyte repletion as needed; goal K>4, Mg>2  - 1.5L fluid restriction, low Na diet  - continue GDMT when possible  -Tylenol 1g up to 3 times a day            Myalgia  Will continue to treat pain as needed      ROSLYN (acute kidney injury)  Pt with increased Cr to 4.7 from 2.0 baseline. Possibly due to renovascular congestion from volume overload.     -Kerns  -Strict Is/Os  -Avoid nephrotoxic agents  -Daily CMPs        Acute hypoxemic respiratory failure  Resolved    CKD (chronic kidney disease), stage IV  History of CKD. See ROSLYN.      Above-knee amputation of right lower extremity  Prior history consistent with exam      Uses self-applied continuous glucose monitoring device  POCT glucose and daily CMPs    -Restart insulin for hyperglycemia      Dermatitis associated with moisture  Large body habitus with dermatitis to folds. Will monitor for infection.    -Zinc based powder  -Topical abx if indicated      Status post above knee amputation, left  History of.      Hypoalbuminemia  Pt with poor PO intake reported. Pt eats one meal a day and has associated nausea and vomiting. Will try antiemetics before feeds to improve intake. Will monitor daily intake and calorie replacement.     -Calorie count  -Diabetic/Renal diet  -Antiemetics before meals; Zofran 4 IV PRN  -Monitor Qtc for prolongation with antiemetics      Uremia  Elevating BUN to 73. Worsening insomnia. Will discuss goals and plan with nephrology.       Impaired functional mobility and endurance  PT/OT for bedbound patient      Paroxysmal atrial fibrillation  Continue home rate  control  NSR on EKG  Eliquis 5mg BID          Anemia  Asymptomatic. Higher than 8 months ago. No signs of acute bleed at this time. Haptoglobin elevated.    -CBC daily  -Transfuse if <7        S/P CABG x 1  History of. Sternal scar well healed.       CAD (coronary artery disease)  History of CAD with retrosternal chest pain. EKG negative to ST changes. Nonspecific T wave inversions.     -Cardiac tele  -EKG prn  -Bidil per Cards recs  -Continue plavix, hold ASA    CAROLIN (generalized anxiety disorder)  Continue home anxiety medications      Essential hypertension  Continue home antihypertensives      Hypocalcemia  Ionized calcium at 0.76. Ca correcting at 8.4.     -Calcium acetate for calcium repletion and for phos binding.       PAD (peripheral artery disease)  History of bilateral AKA.           VTE Risk Mitigation (From admission, onward)         Ordered     apixaban tablet 5 mg  2 times daily         05/11/23 1329     IP VTE HIGH RISK PATIENT  Once         05/07/23 1547                Discharge Planning   DEVAN: 5/18/2023     Code Status: Full Code   Is the patient medically ready for discharge?: No    Reason for patient still in hospital (select all that apply): Treatment and Consult recommendations  Discharge Plan A: Home Health   Discharge Delays: None known at this time      Onur Bueno MD  Department of Hospital Medicine   [facility]

## 2023-05-17 NOTE — ASSESSMENT & PLAN NOTE
Last Echo 6/21 showing     The estimated ejection fraction is 55%.   The left ventricle is normal in size with normal systolic function.   Normal left ventricular diastolic function.   Normal right ventricular size with normal right ventricular systolic function.   Mild tricuspid regurgitation.   The estimated PA systolic pressure is 31 mmHg.   Normal central venous pressure (3 mmHg).    No echocardiogram results found for the past 14 days.  RHC  CVP 15  Wedge 25-32    Patient did not tolerate dobutamine, had chest pain. Dobutamine dc'd    -Now on lasix 240 IV q6h   -Metolazone 10 daily  -Acetazolamide 500 BID   -Repeat RHC in 2 days  -VBG  -Repeat iCalcium  -Replete K  -EKG  -Troponin  -CXR  -Antihypertensive. GDMT, Anticoagulation, and diuresis management per Nephrology and Cardiology in a multidisciplinary team  -Qq4 BMPs with electrolyte repletion as needed; goal K>4, Mg>2  - 1.5L fluid restriction, low Na diet  - continue GDMT when possible  -Tylenol 1g up to 3 times a day

## 2023-05-17 NOTE — SUBJECTIVE & OBJECTIVE
Interval History: Pt having pretty severe pain in her legs and lower abdomen this morning. Pt looks more ill than days prior. More somnolent. Pt still diuresing well. Repeating calcium labs. No perioral numbness.     Review of Systems   Constitutional:  Negative for chills, fatigue and fever.   HENT:  Negative for congestion, dental problem, facial swelling, postnasal drip, rhinorrhea, sore throat and trouble swallowing.    Eyes:  Negative for photophobia and pain.   Respiratory:  Negative for cough, choking, chest tightness, shortness of breath and wheezing.    Cardiovascular:  Positive for leg swelling. Negative for chest pain.   Gastrointestinal:  Positive for abdominal pain. Negative for abdominal distention, diarrhea, nausea and vomiting.   Endocrine: Negative.    Genitourinary:  Negative for dysuria, flank pain, frequency and hematuria.   Musculoskeletal:  Positive for myalgias. Negative for back pain.   Skin:  Negative for pallor and rash.   Allergic/Immunologic: Negative.    Neurological:  Negative for dizziness, syncope, weakness, numbness and headaches.   Psychiatric/Behavioral:  The patient is not nervous/anxious.    Objective:     Vital Signs (Most Recent):  Temp: 97.8 °F (36.6 °C) (05/17/23 1213)  Pulse: 88 (05/17/23 1213)  Resp: 17 (05/17/23 1213)  BP: (!) 107/49 (05/17/23 1213)  SpO2: (!) 93 % (05/17/23 1213) Vital Signs (24h Range):  Temp:  [97.8 °F (36.6 °C)-98.3 °F (36.8 °C)] 97.8 °F (36.6 °C)  Pulse:  [87-94] 88  Resp:  [16-18] 17  SpO2:  [93 %-98 %] 93 %  BP: (107-121)/(49-58) 107/49     Weight: 97.8 kg (215 lb 9.8 oz)  Body mass index is 35.88 kg/m².    Intake/Output Summary (Last 24 hours) at 5/17/2023 1412  Last data filed at 5/17/2023 0915  Gross per 24 hour   Intake 666 ml   Output 1850 ml   Net -1184 ml         Physical Exam  Vitals and nursing note reviewed.   Constitutional:       General: She is not in acute distress.     Appearance: Normal appearance. She is obese. She is not  ill-appearing or diaphoretic.   HENT:      Head: Normocephalic and atraumatic.      Right Ear: External ear normal.      Left Ear: External ear normal.      Nose: Nose normal.      Mouth/Throat:      Mouth: Mucous membranes are moist.      Pharynx: Oropharynx is clear.   Eyes:      General: No scleral icterus.     Extraocular Movements: Extraocular movements intact.      Conjunctiva/sclera: Conjunctivae normal.      Pupils: Pupils are equal, round, and reactive to light.   Cardiovascular:      Rate and Rhythm: Normal rate and regular rhythm.      Pulses: Normal pulses.      Heart sounds: Normal heart sounds. No murmur heard.  Pulmonary:      Effort: Pulmonary effort is normal. No respiratory distress.      Breath sounds: No stridor. No wheezing, rhonchi or rales.   Chest:      Chest wall: No tenderness.   Abdominal:      General: Abdomen is flat. There is no distension.      Palpations: Abdomen is soft.      Tenderness: There is no abdominal tenderness. There is no guarding or rebound.   Musculoskeletal:         General: Swelling present. No tenderness. Normal range of motion.      Cervical back: Normal range of motion and neck supple. No rigidity or tenderness.      Right lower leg: Edema present.      Left lower leg: Edema present.      Comments: Trigger finger R middle finger   Skin:     General: Skin is warm and dry.      Capillary Refill: Capillary refill takes less than 2 seconds.      Coloration: Skin is not jaundiced.      Findings: No erythema.   Neurological:      General: No focal deficit present.      Mental Status: She is alert and oriented to person, place, and time.      Cranial Nerves: No cranial nerve deficit.      Motor: No weakness.   Psychiatric:         Mood and Affect: Mood normal.         Behavior: Behavior normal.           Significant Labs: All pertinent labs within the past 24 hours have been reviewed.    Significant Imaging: I have reviewed all pertinent imaging results/findings within the  past 24 hours.

## 2023-05-18 LAB
ALBUMIN SERPL BCP-MCNC: 2.3 G/DL (ref 3.5–5.2)
ALP SERPL-CCNC: 181 U/L (ref 55–135)
ALT SERPL W/O P-5'-P-CCNC: 6 U/L (ref 10–44)
ANION GAP SERPL CALC-SCNC: 15 MMOL/L (ref 8–16)
ANION GAP SERPL CALC-SCNC: 16 MMOL/L (ref 8–16)
AST SERPL-CCNC: 25 U/L (ref 10–40)
BASOPHILS # BLD AUTO: 0.08 K/UL (ref 0–0.2)
BASOPHILS NFR BLD: 1.6 % (ref 0–1.9)
BILIRUB SERPL-MCNC: 0.3 MG/DL (ref 0.1–1)
BUN SERPL-MCNC: 80 MG/DL (ref 6–20)
BUN SERPL-MCNC: 87 MG/DL (ref 6–20)
CALCIUM SERPL-MCNC: 7.4 MG/DL (ref 8.7–10.5)
CALCIUM SERPL-MCNC: 8.2 MG/DL (ref 8.7–10.5)
CHLORIDE SERPL-SCNC: 93 MMOL/L (ref 95–110)
CHLORIDE SERPL-SCNC: 93 MMOL/L (ref 95–110)
CO2 SERPL-SCNC: 25 MMOL/L (ref 23–29)
CO2 SERPL-SCNC: 27 MMOL/L (ref 23–29)
CREAT SERPL-MCNC: 6.7 MG/DL (ref 0.5–1.4)
CREAT SERPL-MCNC: 6.7 MG/DL (ref 0.5–1.4)
DIFFERENTIAL METHOD: ABNORMAL
EOSINOPHIL # BLD AUTO: 0.2 K/UL (ref 0–0.5)
EOSINOPHIL NFR BLD: 4.3 % (ref 0–8)
ERYTHROCYTE [DISTWIDTH] IN BLOOD BY AUTOMATED COUNT: 13.9 % (ref 11.5–14.5)
EST. GFR  (NO RACE VARIABLE): 6.7 ML/MIN/1.73 M^2
EST. GFR  (NO RACE VARIABLE): 6.7 ML/MIN/1.73 M^2
GLUCOSE SERPL-MCNC: 104 MG/DL (ref 70–110)
GLUCOSE SERPL-MCNC: 120 MG/DL (ref 70–110)
HCT VFR BLD AUTO: 31.4 % (ref 37–48.5)
HGB BLD-MCNC: 9.1 G/DL (ref 12–16)
IMM GRANULOCYTES # BLD AUTO: 0.01 K/UL (ref 0–0.04)
IMM GRANULOCYTES NFR BLD AUTO: 0.2 % (ref 0–0.5)
LYMPHOCYTES # BLD AUTO: 1 K/UL (ref 1–4.8)
LYMPHOCYTES NFR BLD: 21 % (ref 18–48)
MAGNESIUM SERPL-MCNC: 1.9 MG/DL (ref 1.6–2.6)
MCH RBC QN AUTO: 30.3 PG (ref 27–31)
MCHC RBC AUTO-ENTMCNC: 29 G/DL (ref 32–36)
MCV RBC AUTO: 105 FL (ref 82–98)
MONOCYTES # BLD AUTO: 0.7 K/UL (ref 0.3–1)
MONOCYTES NFR BLD: 14.2 % (ref 4–15)
NEUTROPHILS # BLD AUTO: 2.9 K/UL (ref 1.8–7.7)
NEUTROPHILS NFR BLD: 58.7 % (ref 38–73)
NRBC BLD-RTO: 0 /100 WBC
PHOSPHATE SERPL-MCNC: 6.6 MG/DL (ref 2.7–4.5)
PHOSPHATE SERPL-MCNC: 6.7 MG/DL (ref 2.7–4.5)
PLATELET # BLD AUTO: 340 K/UL (ref 150–450)
PMV BLD AUTO: 10.9 FL (ref 9.2–12.9)
POCT GLUCOSE: 109 MG/DL (ref 70–110)
POCT GLUCOSE: 109 MG/DL (ref 70–110)
POCT GLUCOSE: 121 MG/DL (ref 70–110)
POCT GLUCOSE: 86 MG/DL (ref 70–110)
POTASSIUM SERPL-SCNC: 3.8 MMOL/L (ref 3.5–5.1)
POTASSIUM SERPL-SCNC: 4.4 MMOL/L (ref 3.5–5.1)
PROT SERPL-MCNC: 7.2 G/DL (ref 6–8.4)
RBC # BLD AUTO: 3 M/UL (ref 4–5.4)
SODIUM SERPL-SCNC: 134 MMOL/L (ref 136–145)
SODIUM SERPL-SCNC: 135 MMOL/L (ref 136–145)
WBC # BLD AUTO: 4.86 K/UL (ref 3.9–12.7)

## 2023-05-18 PROCEDURE — 97535 SELF CARE MNGMENT TRAINING: CPT

## 2023-05-18 PROCEDURE — 97530 THERAPEUTIC ACTIVITIES: CPT

## 2023-05-18 PROCEDURE — 25000003 PHARM REV CODE 250: Performed by: STUDENT IN AN ORGANIZED HEALTH CARE EDUCATION/TRAINING PROGRAM

## 2023-05-18 PROCEDURE — 99233 PR SUBSEQUENT HOSPITAL CARE,LEVL III: ICD-10-PCS | Mod: GC,,, | Performed by: STUDENT IN AN ORGANIZED HEALTH CARE EDUCATION/TRAINING PROGRAM

## 2023-05-18 PROCEDURE — 83735 ASSAY OF MAGNESIUM: CPT | Performed by: STUDENT IN AN ORGANIZED HEALTH CARE EDUCATION/TRAINING PROGRAM

## 2023-05-18 PROCEDURE — 25000003 PHARM REV CODE 250

## 2023-05-18 PROCEDURE — 80053 COMPREHEN METABOLIC PANEL: CPT

## 2023-05-18 PROCEDURE — 85025 COMPLETE CBC W/AUTO DIFF WBC: CPT | Performed by: HOSPITALIST

## 2023-05-18 PROCEDURE — 20600001 HC STEP DOWN PRIVATE ROOM

## 2023-05-18 PROCEDURE — 80048 BASIC METABOLIC PNL TOTAL CA: CPT | Mod: XB | Performed by: STUDENT IN AN ORGANIZED HEALTH CARE EDUCATION/TRAINING PROGRAM

## 2023-05-18 PROCEDURE — 36415 COLL VENOUS BLD VENIPUNCTURE: CPT

## 2023-05-18 PROCEDURE — 63600175 PHARM REV CODE 636 W HCPCS: Performed by: STUDENT IN AN ORGANIZED HEALTH CARE EDUCATION/TRAINING PROGRAM

## 2023-05-18 PROCEDURE — 25000003 PHARM REV CODE 250: Performed by: INTERNAL MEDICINE

## 2023-05-18 PROCEDURE — 84100 ASSAY OF PHOSPHORUS: CPT | Mod: 91 | Performed by: STUDENT IN AN ORGANIZED HEALTH CARE EDUCATION/TRAINING PROGRAM

## 2023-05-18 PROCEDURE — 99233 SBSQ HOSP IP/OBS HIGH 50: CPT | Mod: ,,, | Performed by: INTERNAL MEDICINE

## 2023-05-18 PROCEDURE — 99233 SBSQ HOSP IP/OBS HIGH 50: CPT | Mod: GC,,, | Performed by: STUDENT IN AN ORGANIZED HEALTH CARE EDUCATION/TRAINING PROGRAM

## 2023-05-18 PROCEDURE — 25000003 PHARM REV CODE 250: Performed by: HOSPITALIST

## 2023-05-18 PROCEDURE — 36415 COLL VENOUS BLD VENIPUNCTURE: CPT | Performed by: STUDENT IN AN ORGANIZED HEALTH CARE EDUCATION/TRAINING PROGRAM

## 2023-05-18 PROCEDURE — 99233 PR SUBSEQUENT HOSPITAL CARE,LEVL III: ICD-10-PCS | Mod: ,,, | Performed by: INTERNAL MEDICINE

## 2023-05-18 PROCEDURE — 84100 ASSAY OF PHOSPHORUS: CPT

## 2023-05-18 RX ORDER — SPIRONOLACTONE 25 MG/1
25 TABLET ORAL DAILY
Status: DISCONTINUED | OUTPATIENT
Start: 2023-05-18 | End: 2023-05-18

## 2023-05-18 RX ORDER — TORSEMIDE 100 MG/1
100 TABLET ORAL 2 TIMES DAILY
Status: DISCONTINUED | OUTPATIENT
Start: 2023-05-18 | End: 2023-05-19

## 2023-05-18 RX ORDER — HYDROMORPHONE HYDROCHLORIDE 2 MG/1
2 TABLET ORAL 2 TIMES DAILY PRN
Status: DISCONTINUED | OUTPATIENT
Start: 2023-05-18 | End: 2023-05-29 | Stop reason: HOSPADM

## 2023-05-18 RX ORDER — FAMOTIDINE 20 MG/1
20 TABLET, FILM COATED ORAL DAILY
Status: DISCONTINUED | OUTPATIENT
Start: 2023-05-18 | End: 2023-05-29 | Stop reason: HOSPADM

## 2023-05-18 RX ORDER — CALCIUM ACETATE 667 MG/1
1334 CAPSULE ORAL
Status: DISCONTINUED | OUTPATIENT
Start: 2023-05-18 | End: 2023-05-29 | Stop reason: HOSPADM

## 2023-05-18 RX ORDER — ACETAMINOPHEN 500 MG
1000 TABLET ORAL EVERY 8 HOURS PRN
Status: DISCONTINUED | OUTPATIENT
Start: 2023-05-18 | End: 2023-05-29 | Stop reason: HOSPADM

## 2023-05-18 RX ORDER — CALCIUM GLUCONATE 20 MG/ML
3 INJECTION, SOLUTION INTRAVENOUS ONCE
Status: COMPLETED | OUTPATIENT
Start: 2023-05-18 | End: 2023-05-18

## 2023-05-18 RX ADMIN — TORSEMIDE 100 MG: 100 TABLET ORAL at 05:05

## 2023-05-18 RX ADMIN — CALCIUM ACETATE 667 MG: 667 CAPSULE ORAL at 09:05

## 2023-05-18 RX ADMIN — ACETAZOLAMIDE 500 MG: 250 TABLET ORAL at 09:05

## 2023-05-18 RX ADMIN — HYDRALAZINE HYDROCHLORIDE 75 MG: 50 TABLET ORAL at 09:05

## 2023-05-18 RX ADMIN — FAMOTIDINE 20 MG: 20 TABLET ORAL at 09:05

## 2023-05-18 RX ADMIN — ACETAMINOPHEN 1000 MG: 500 TABLET ORAL at 11:05

## 2023-05-18 RX ADMIN — CLOPIDOGREL BISULFATE 75 MG: 75 TABLET ORAL at 09:05

## 2023-05-18 RX ADMIN — CALCIUM ACETATE 1334 MG: 667 CAPSULE ORAL at 05:05

## 2023-05-18 RX ADMIN — AMITRIPTYLINE HYDROCHLORIDE 50 MG: 25 TABLET, FILM COATED ORAL at 09:05

## 2023-05-18 RX ADMIN — ACETAMINOPHEN 1000 MG: 500 TABLET ORAL at 09:05

## 2023-05-18 RX ADMIN — CARVEDILOL 6.25 MG: 6.25 TABLET, FILM COATED ORAL at 09:05

## 2023-05-18 RX ADMIN — APIXABAN 5 MG: 5 TABLET, FILM COATED ORAL at 09:05

## 2023-05-18 RX ADMIN — HYDRALAZINE HYDROCHLORIDE 75 MG: 50 TABLET ORAL at 02:05

## 2023-05-18 RX ADMIN — CALCIUM GLUCONATE 3 G: 20 INJECTION, SOLUTION INTRAVENOUS at 09:05

## 2023-05-18 RX ADMIN — ISOSORBIDE DINITRATE 40 MG: 20 TABLET ORAL at 09:05

## 2023-05-18 RX ADMIN — FUROSEMIDE 240 MG: 10 INJECTION, SOLUTION INTRAMUSCULAR; INTRAVENOUS at 05:05

## 2023-05-18 RX ADMIN — METOLAZONE 10 MG: 5 TABLET ORAL at 09:05

## 2023-05-18 RX ADMIN — CALCITRIOL CAPSULES 0.25 MCG 1 MCG: 0.25 CAPSULE ORAL at 09:05

## 2023-05-18 RX ADMIN — ATORVASTATIN CALCIUM 80 MG: 40 TABLET, FILM COATED ORAL at 09:05

## 2023-05-18 RX ADMIN — SERTRALINE HYDROCHLORIDE 50 MG: 50 TABLET ORAL at 09:05

## 2023-05-18 RX ADMIN — FUROSEMIDE 240 MG: 10 INJECTION, SOLUTION INTRAMUSCULAR; INTRAVENOUS at 11:05

## 2023-05-18 RX ADMIN — CALCIUM ACETATE 1334 MG: 667 CAPSULE ORAL at 11:05

## 2023-05-18 RX ADMIN — SPIRONOLACTONE 25 MG: 25 TABLET, FILM COATED ORAL at 11:05

## 2023-05-18 RX ADMIN — ISOSORBIDE DINITRATE 40 MG: 20 TABLET ORAL at 02:05

## 2023-05-18 NOTE — ASSESSMENT & PLAN NOTE
Pt with increased Cr to 6.7 from 2.0 baseline. Possibly due to renovascular congestion from volume overload.     -Kerns  -Strict Is/Os  -Avoid nephrotoxic agents  -Daily CMPs

## 2023-05-18 NOTE — PROGRESS NOTES
[facility]  Nephrology  Progress Note    Patient Name: Suyapa Connelly  MRN: [id number]  Admission Date: 5/1/2023  Hospital Length of Stay: 15 days  Attending Provider: Batsheva Torres DO   Primary Care Physician: Magen Christensen MD  Principal Problem:Acute on chronic combined systolic and diastolic heart failure    Subjective:     HPI: 57 y.o. female with past medical history of CAD s/p CABG, CKD, DM, HTN, HLD, s/p bilateral AKA who presnets with chest pain, difficulty breathing, leg swelling. Her most recent ECHO revealed severe systolic dysfunction with EF of 15%. She has been started on diuretics with IV lasix and PO metolazone with adequate urinary response however her renal function has been deteriorating throughout the admission. She reports requiring dialysis at one time before, around the time she underwent her AKAs. Her baseline serum creatinine is in the 2s and it has trended up to 5 during this admission. Nephrology consulted for further evaluation of ORSLYN on CKD.         Interval History: Seen and evaluated by bedside. Serum creatinine at 6.7 this AM. UOP of 1.4L over the past 24h. Ca improving.     Review of patient's allergies indicates:   Allergen Reactions    Ciprofloxacin Itching    Contrast media      Kidney injury    Iodine      Kidney injury     Current Facility-Administered Medications   Medication Frequency    acetaminophen tablet 1,000 mg Q6H PRN    acetaZOLAMIDE tablet 500 mg BID    [START ON 5/19/2023] allopurinol split tablet 50 mg Every other day    amitriptyline tablet 50 mg QHS    apixaban tablet 5 mg BID    atorvastatin tablet 80 mg Daily    calcitRIOL capsule 1 mcg Daily    calcium acetate(phosphat bind) capsule 667 mg TID WM    calcium gluconate 1 g in NS IVPB (premixed) Once    carvediloL tablet 6.25 mg BID    clopidogreL tablet 75 mg Daily    dextrose 10% bolus 125 mL 125 mL PRN    dextrose 10% bolus 250 mL 250 mL PRN    dextrose 40 % gel 15,000 mg PRN    dextrose 40  % gel 30,000 mg PRN    famotidine tablet 20 mg Daily    furosemide (LASIX) 240 mg in dextrose 5 % (D5W) 50 mL IVPB Q6H    glucagon (human recombinant) injection 1 mg PRN    hydrALAZINE tablet 75 mg TID    insulin aspart U-100 pen 0-5 Units QID (AC + HS) PRN    insulin detemir U-100 (Levemir) pen 8 Units QHS    isosorbide dinitrate tablet 40 mg TID    melatonin tablet 6 mg Nightly PRN    metOLazone tablet 10 mg Daily    naloxone 0.4 mg/mL injection 0.02 mg PRN    ondansetron disintegrating tablet 8 mg Q8H PRN    sertraline tablet 50 mg Daily    sodium chloride 0.9% flush 10 mL Q12H PRN    sodium chloride 0.9% flush 10 mL PRN       Objective:     Vital Signs (Most Recent):  Temp: 97.7 °F (36.5 °C) (05/18/23 0723)  Pulse: 86 (05/18/23 0723)  Resp: 18 (05/18/23 0723)  BP: (!) 122/57 (05/18/23 0723)  SpO2: 95 % (05/18/23 0723) Vital Signs (24h Range):  Temp:  [97.5 °F (36.4 °C)-98.1 °F (36.7 °C)] 97.7 °F (36.5 °C)  Pulse:  [79-90] 86  Resp:  [16-20] 18  SpO2:  [93 %-98 %] 95 %  BP: (107-122)/(49-88) 122/57     Weight:  (bed scale not working) (05/18/23 0518)  Body mass index is 35.88 kg/m².  Body surface area is 2.12 meters squared.    I/O last 3 completed shifts:  In: 444 [P.O.:444]  Out: 2650 [Urine:2650]    Physical Exam  Vitals and nursing note reviewed.   Constitutional:       General: She is not in acute distress.     Appearance: Normal appearance. She is obese. She is not ill-appearing or diaphoretic.   HENT:      Head: Normocephalic and atraumatic.      Right Ear: External ear normal.      Left Ear: External ear normal.      Nose: Nose normal.      Mouth/Throat:      Mouth: Mucous membranes are moist.      Pharynx: Oropharynx is clear.   Eyes:      General: No scleral icterus.     Extraocular Movements: Extraocular movements intact.      Conjunctiva/sclera: Conjunctivae normal.      Pupils: Pupils are equal, round, and reactive to light.   Cardiovascular:      Rate and Rhythm: Normal rate and regular rhythm.       Pulses: Normal pulses.      Heart sounds: Normal heart sounds. No murmur heard.  Pulmonary:      Effort: Pulmonary effort is normal. No respiratory distress.      Breath sounds: Normal breath sounds. No stridor. No wheezing or rhonchi.      Comments: On NC  Abdominal:      General: Abdomen is flat. There is no distension.      Palpations: Abdomen is soft.      Tenderness: There is no abdominal tenderness. There is no guarding.   Musculoskeletal:         General: Swelling present. No tenderness. Normal range of motion.      Cervical back: Normal range of motion and neck supple. No rigidity or tenderness.      Right lower leg: Edema present.      Left lower leg: Edema present.      Comments: Trigger finger R middle finger  BL AKA stump edema +2  L thigh tenderness with palpation    Skin:     General: Skin is warm and dry.      Capillary Refill: Capillary refill takes less than 2 seconds.      Coloration: Skin is not jaundiced.      Findings: No erythema.   Neurological:      General: No focal deficit present.      Mental Status: She is alert and oriented to person, place, and time.   Psychiatric:         Mood and Affect: Mood normal.         Behavior: Behavior normal.        Significant Labs:  CBC:   Recent Labs   Lab 05/18/23 0428   WBC 4.86   RBC 3.00*   HGB 9.1*   HCT 31.4*      *   MCH 30.3   MCHC 29.0*       CMP:   Recent Labs   Lab 05/18/23 0428      CALCIUM 7.4*   ALBUMIN 2.3*   PROT 7.2   *   K 4.4   CO2 25   CL 93*   BUN 80*   CREATININE 6.7*   ALKPHOS 181*   ALT 6*   AST 25   BILITOT 0.3            Assessment/Plan:     Cardiac/Vascular  * Acute on chronic combined systolic and diastolic heart failure  Management per primary       S/P CABG x 1  Management per primary       Renal/  ROSLYN (acute kidney injury)  ROSLYN on CKD, likely ischemic, CRS and diuresis  Baseline serum creatinine in the 2s, up to 3.9 on admission; up to 6.3 on her most recent labs  Adequate urine output while on  diuretics; has made 1.4L over the past 24h  Retroperitoneal US with no hydronephrosis    ECHO with severe systolic dysfunction, EF of 15%    RHC with PCWP of 30   Continue diuresis for additional volume removal; recommend to switch to oral regimen with torsemide 100mg PO BID, Metolazone 10mg PO daily, acetazolamide 500mg PO daily and spironolactone 25mg PO daily   Consider repeating RHC during this admission to assess response to diuresis   Pt remains at high risk of requiring hemodialysis again in the near future ir persistent deterioration of her renal function   Dose medications to GFR    Avoid nephrotoxic agents as much as possible              Jeremy Juan MD  Nephrology  [facility] - Cardiology Stepdown    ATTENDING PHYSICIAN ATTESTATION  I have personally verified the history and examined the patient. I thoroughly reviewed the demographic, clinical, laboratorial and imaging information available in medical records. I agree with the assessment and recommendations provided by the subspecialty resident who was under my supervision.

## 2023-05-18 NOTE — ASSESSMENT & PLAN NOTE
Last Echo 6/21 showing     The estimated ejection fraction is 55%.   The left ventricle is normal in size with normal systolic function.   Normal left ventricular diastolic function.   Normal right ventricular size with normal right ventricular systolic function.   Mild tricuspid regurgitation.   The estimated PA systolic pressure is 31 mmHg.   Normal central venous pressure (3 mmHg).    New Echo   The left ventricle is mildly enlarged with severely decreased systolic function. The estimated ejection fraction is 15%.   There is severe left ventricular global hypokinesis.   Normal right ventricular size with low normal right ventricular systolic function.   Grade I left ventricular diastolic dysfunction.   Biatrial enlargement.   Mild-to-moderate mitral regurgitation.   Severe tricuspid regurgitation.   The estimated PA systolic pressure is 43 mmHg.   Elevated central venous pressure (15 mmHg).           RHC  CVP 15  Wedge 25-32    Patient did not tolerate dobutamine, had chest pain. Dobutamine dc'd  Per cardiology the patients significant peripheral vascular disease prohibits advanced options    -Lasix converted to oral torsemide  -Spiranolactone added  -Metolazone 10 daily oral  -Acetazolamide 500 BID   BMPs BID  -Antihypertensive. GDMT, Anticoagulation, and diuresis management per Nephrology and Cardiology in a multidisciplinary team  -Qq4 BMPs with electrolyte repletion as needed; goal K>4, Mg>2  - 1.5L fluid restriction, low Na diet  - continue GDMT when possible  -Tylenol 1g up to 3 times a day

## 2023-05-18 NOTE — H&P
Calcium gluconate pending administration. No available on the unit and messages sent to pharmacy. Passes on to receiving nurse Ceasar to administer.

## 2023-05-18 NOTE — SUBJECTIVE & OBJECTIVE
Interval History: Seen and evaluated by bedside. Serum creatinine at 6.7 this AM. UOP of 1.4L over the past 24h. Ca improving.     Review of patient's allergies indicates:   Allergen Reactions    Ciprofloxacin Itching    Contrast media      Kidney injury    Iodine      Kidney injury     Current Facility-Administered Medications   Medication Frequency    acetaminophen tablet 1,000 mg Q6H PRN    acetaZOLAMIDE tablet 500 mg BID    [START ON 5/19/2023] allopurinol split tablet 50 mg Every other day    amitriptyline tablet 50 mg QHS    apixaban tablet 5 mg BID    atorvastatin tablet 80 mg Daily    calcitRIOL capsule 1 mcg Daily    calcium acetate(phosphat bind) capsule 667 mg TID WM    calcium gluconate 1 g in NS IVPB (premixed) Once    carvediloL tablet 6.25 mg BID    clopidogreL tablet 75 mg Daily    dextrose 10% bolus 125 mL 125 mL PRN    dextrose 10% bolus 250 mL 250 mL PRN    dextrose 40 % gel 15,000 mg PRN    dextrose 40 % gel 30,000 mg PRN    famotidine tablet 20 mg Daily    furosemide (LASIX) 240 mg in dextrose 5 % (D5W) 50 mL IVPB Q6H    glucagon (human recombinant) injection 1 mg PRN    hydrALAZINE tablet 75 mg TID    insulin aspart U-100 pen 0-5 Units QID (AC + HS) PRN    insulin detemir U-100 (Levemir) pen 8 Units QHS    isosorbide dinitrate tablet 40 mg TID    melatonin tablet 6 mg Nightly PRN    metOLazone tablet 10 mg Daily    naloxone 0.4 mg/mL injection 0.02 mg PRN    ondansetron disintegrating tablet 8 mg Q8H PRN    sertraline tablet 50 mg Daily    sodium chloride 0.9% flush 10 mL Q12H PRN    sodium chloride 0.9% flush 10 mL PRN       Objective:     Vital Signs (Most Recent):  Temp: 97.7 °F (36.5 °C) (05/18/23 0723)  Pulse: 86 (05/18/23 0723)  Resp: 18 (05/18/23 0723)  BP: (!) 122/57 (05/18/23 0723)  SpO2: 95 % (05/18/23 0723) Vital Signs (24h Range):  Temp:  [97.5 °F (36.4 °C)-98.1 °F (36.7 °C)] 97.7 °F (36.5 °C)  Pulse:  [79-90] 86  Resp:  [16-20] 18  SpO2:  [93 %-98 %] 95 %  BP: (107-122)/(49-88)  122/57     Weight:  (bed scale not working) (05/18/23 0518)  Body mass index is 35.88 kg/m².  Body surface area is 2.12 meters squared.    I/O last 3 completed shifts:  In: 444 [P.O.:444]  Out: 2650 [Urine:2650]     Physical Exam  Vitals and nursing note reviewed.   Constitutional:       General: She is not in acute distress.     Appearance: Normal appearance. She is obese. She is not ill-appearing or diaphoretic.   HENT:      Head: Normocephalic and atraumatic.      Right Ear: External ear normal.      Left Ear: External ear normal.      Nose: Nose normal.      Mouth/Throat:      Mouth: Mucous membranes are moist.      Pharynx: Oropharynx is clear.   Eyes:      General: No scleral icterus.     Extraocular Movements: Extraocular movements intact.      Conjunctiva/sclera: Conjunctivae normal.      Pupils: Pupils are equal, round, and reactive to light.   Cardiovascular:      Rate and Rhythm: Normal rate and regular rhythm.      Pulses: Normal pulses.      Heart sounds: Normal heart sounds. No murmur heard.  Pulmonary:      Effort: Pulmonary effort is normal. No respiratory distress.      Breath sounds: Normal breath sounds. No stridor. No wheezing or rhonchi.      Comments: On NC  Abdominal:      General: Abdomen is flat. There is no distension.      Palpations: Abdomen is soft.      Tenderness: There is no abdominal tenderness. There is no guarding.   Musculoskeletal:         General: Swelling present. No tenderness. Normal range of motion.      Cervical back: Normal range of motion and neck supple. No rigidity or tenderness.      Right lower leg: Edema present.      Left lower leg: Edema present.      Comments: Trigger finger R middle finger  BL AKA stump edema +2  L thigh tenderness with palpation    Skin:     General: Skin is warm and dry.      Capillary Refill: Capillary refill takes less than 2 seconds.      Coloration: Skin is not jaundiced.      Findings: No erythema.   Neurological:      General: No focal  deficit present.      Mental Status: She is alert and oriented to person, place, and time.   Psychiatric:         Mood and Affect: Mood normal.         Behavior: Behavior normal.        Significant Labs:  CBC:   Recent Labs   Lab 05/18/23 0428   WBC 4.86   RBC 3.00*   HGB 9.1*   HCT 31.4*      *   MCH 30.3   MCHC 29.0*       CMP:   Recent Labs   Lab 05/18/23 0428      CALCIUM 7.4*   ALBUMIN 2.3*   PROT 7.2   *   K 4.4   CO2 25   CL 93*   BUN 80*   CREATININE 6.7*   ALKPHOS 181*   ALT 6*   AST 25   BILITOT 0.3

## 2023-05-18 NOTE — PT/OT/SLP EVAL
"Physical Therapy Evaluation and Discharge Note    Patient Name:  Suyapa Connelly   MRN:  [id number]    Recommendations:     Discharge Recommendations:  outpatient PT   Discharge Equipment Recommendations: none   Barriers to discharge: None    Assessment:     Suyapa Connelly is a 54 y.o. female admitted with a medical diagnosis of SOB. Pt is with a hx of DM2 (A1c 10.2) on insulin, CAD s/p MI (2012), HTN, PAD s/p stent, heart failure (unknown EF- not available in records or care everywhere), anxiety/depression who presents with SOB since 1am last night.     Currently requires S to amb in the hallway without use of AD for support.  Noted minimal lateral trunk displacement with gait and HARTMAN after amb a short distance < 500'.  No history of falls. At this time, patient is functioning at their prior level of function and does not require further acute PT services.     Recent Surgery: * No surgery found *      Plan:     During this hospitalization, patient does not require further acute PT services.  Please re-consult if situation changes.      Subjective     Chief Complaint: B LE pain, HARTMAN and LE cramping   Patient/Family Comments/goals: "I have 2 stents in my legs and my feet have been numb since."  "I have really bad cramps."  Pain/Comfort:  · Pain Rating 1: 6/10  · Location - Side 1: Bilateral  · Location 1: leg  · Pain Addressed 1: Distraction, Pre-medicate for activity    Patients cultural, spiritual, Jehovah's witness conflicts given the current situation: no    Living Environment:  Pt lives with spouse, SSH, 4 NASIR with L HRs.   Prior to admission, patients level of function was I with all functional mobility and ADLs.  Equipment used at home: none.  DME owned (not currently used): none.  Upon discharge, patient will have assistance from spouse.    Objective:     Communicated with nursing prior to session.  Patient found standing in room with peripheral IV upon PT entry to room.    General Precautions: Standard, fall, " diabetic   Orthopedic Precautions:N/A   Braces: N/A     Exams:  · Cognitive Exam:  Patient is oriented to Person, Place, Time and Situation  · Fine Motor Coordination:    · -       Intact  Left hand thumb/finger opposition skills and Right hand thumb/finger opposition skills  · Gross Motor Coordination:  WFL  · Postural Exam:  Patient presented with the following abnormalities:    · -       No postural abnormalities identified  · Sensation:    · -       Intact  · Skin Integrity/Edema:      · -       Edema: None noted B LEs  · RUE ROM: WFL  · RUE Strength: WFL  · LUE ROM: WFL  · LUE Strength: WFL  · RLE ROM: WFL  · RLE Strength: WFL  · LLE ROM: WFL  · LLE Strength: WFL    Functional Mobility:  · Bed Mobility:     · Scooting: independence  · Supine to Sit: independence  · Transfers:     · Sit to Stand:  independence with no AD  · Bed to Chair: independence with  no AD  using  Stand Pivot  · Toilet Transfer: independence with  no AD  using  Stand Pivot    · Gait: Pt amb S, without AD, >224', minimal lateral trunk displacement, minimal HARTMAN noted, 7/10 RPE following    · Balance: S: dynamic standing balance without AD    AM-Located within [facility] 6 CLICK MOBILITY  Total Score:22       Therapeutic Activities and Exercises:   Whiteboard updated    AM-Located within [facility] 6 CLICK MOBILITY  Total Score:22     Patient left sitting EOB with all lines intact and call button in reach.    GOALS:   Multidisciplinary Problems     Physical Therapy Goals     Not on file                History:     Past Medical History:   Diagnosis Date    Anxiety     Depression     Diabetes mellitus     type 2    GERD (gastroesophageal reflux disease)     Hyperlipemia     Hypertension     Myocardial infarction 2010    minor-caused by stress per pt.       Past Surgical History:   Procedure Laterality Date    Angiogram - Right Extremity Right 7/9/15    angiogram-left leg  10/6/15       Time Tracking:     PT Received On: 12/07/19  PT Start Time: 0956     PT Stop Time: 1013  PT  Total Time (min): 17 min     Billable Minutes: Evaluation 7 and Gait Training 8      Maryann Quintana, PT  12/07/2019   Spontaneous, unlabored and symmetrical

## 2023-05-18 NOTE — PROGRESS NOTES
[facility] - Cardiology [facility] Medicine  Progress Note    Patient Name: Suyapa Connelly  MRN: [id number]  Patient Class: IP- Inpatient   Admission Date: 5/1/2023  Length of Stay: 15 days  Attending Physician: Batsheva Torres DO  Primary Care Provider: Magen Christensen MD        Subjective:     Principal Problem:Acute on chronic combined systolic and diastolic heart failure        HPI:  57 y.o. female with past medical history of CAD s/p CABG, CKD, DM, HTN, HLD, s/p bilateral AKA who presnets with chest pain, difficulty breathing, leg swelling.     Per ED She has been unable to access any of her medications for the last 3 months. Over the last few months she has noted worsening shortness of breath, particulatly orthopnea, and leg swelling. In the last two weeks she endorses associated abdominal pain and swelling, reduced appetite, and pleuritic chest pain with deep inspiration. She endorses occasional lighthededness with changes in position. Last week she had an episode where she was unable to breathe for about two minutes which was very upsetting for her and her , but she recovered and did not seek care at that time. Today, she complains of significant difficulty breathing, swelling in her legs and abdomen, and reduced appetite.     No headaches, visual changes, URI symptoms, palpitations, nausea, vomiting, diarrhea, blood in her stool, dysuria, hematuria, numbness or weakness in her extremities. She does endorse occasional paresthesias associated with her chronic phantom limb pain. She additionally has had increased difficulties with depression recently, denies suicidal or homicidal ideation, but has had considerable stress and low mood.      Of note, the patient saw a new primary care provider 4/21 for similar concerns who recommended she proceed to the ED at that time, but they were unable due to financial and geographic concerns. She lives two hours away from [facility] and her  cares for her and  elderly mother with dementia. She has had decreased access to medical care over the last year, and unable to access most of her prescriptions. She had a 90 day supply of four medications (lasix, statin, clopidogrel, sertraline) but these were accidentally lost and she has been unable to afford a replacement. They present today for evaluation and to reestablish care and access to her prescriptions.     Labs on admission significant for an elevated BNP, hypocalcemia which corrected is 8.3, significant elevation in Creatinine and a slight elevation of troponin    CXR  Cardiomegaly with pulmonary interstitial edema, suggestive of pulmonary edema secondary to CHF.      Overview/Hospital Course:  Diuresis initiated with Lasix IV.  1.2L UOP in the past 24 hours.  Vitals reassuring with adequate sats on ambient air.  Still very edematious; Cardiology recs lasix gtt.  GDMT initiated. Pt given 2mg morphine for moderate to severe pain in legs from swelling. Diuresis increased to 40mg/hr due to inadequate urine output. Cr continued to increase. Cr stable at new Lasix rate. Diuresed 1.9L with some improvement in clinical status. S/p RHC that showed wedge of 26-32 with CVP of 15. Will continue diuresis with Lasix with the addition of metolazone and acetazolamide. Discussions had with nephrology about electrolyte abnormalities. GOC discussions underway.       Interval History:  continuing to diurese. Increasingly lethargic on exam    Review of Systems   Constitutional:  Negative for chills, fatigue and fever.   HENT:  Negative for congestion, dental problem, facial swelling, postnasal drip, rhinorrhea, sore throat and trouble swallowing.    Eyes:  Negative for photophobia and pain.   Respiratory:  Negative for cough, choking, chest tightness, shortness of breath and wheezing.    Cardiovascular:  Positive for leg swelling. Negative for chest pain.   Gastrointestinal:  Positive for abdominal pain. Negative for abdominal distention,  diarrhea, nausea and vomiting.   Endocrine: Negative.    Genitourinary:  Negative for dysuria, flank pain, frequency and hematuria.   Musculoskeletal:  Positive for myalgias. Negative for back pain.   Skin:  Negative for pallor and rash.   Allergic/Immunologic: Negative.    Neurological:  Negative for dizziness, syncope, weakness, numbness and headaches.   Psychiatric/Behavioral:  The patient is not nervous/anxious.    Objective:     Vital Signs (Most Recent):  Temp: 98.1 °F (36.7 °C) (05/18/23 1118)  Pulse: 80 (05/18/23 1118)  Resp: 18 (05/18/23 1118)  BP: (!) 121/59 (05/18/23 1118)  SpO2: (!) 94 % (05/18/23 1118) Vital Signs (24h Range):  Temp:  [97.5 °F (36.4 °C)-98.1 °F (36.7 °C)] 98.1 °F (36.7 °C)  Pulse:  [79-89] 80  Resp:  [16-20] 18  SpO2:  [93 %-98 %] 94 %  BP: (107-122)/(49-88) 121/59     Weight:  (bed scale not working)  Body mass index is 35.88 kg/m².    Intake/Output Summary (Last 24 hours) at 5/18/2023 1146  Last data filed at 5/18/2023 1143  Gross per 24 hour   Intake 544 ml   Output 1100 ml   Net -556 ml         Physical Exam  Vitals and nursing note reviewed.   Constitutional:       General: She is not in acute distress.     Appearance: Normal appearance. She is obese. She is not ill-appearing or diaphoretic.   HENT:      Head: Normocephalic and atraumatic.      Right Ear: External ear normal.      Left Ear: External ear normal.      Nose: Nose normal.      Mouth/Throat:      Mouth: Mucous membranes are moist.      Pharynx: Oropharynx is clear.   Eyes:      General: No scleral icterus.     Extraocular Movements: Extraocular movements intact.      Conjunctiva/sclera: Conjunctivae normal.      Pupils: Pupils are equal, round, and reactive to light.   Cardiovascular:      Rate and Rhythm: Normal rate and regular rhythm.      Pulses: Normal pulses.      Heart sounds: Normal heart sounds. No murmur heard.  Pulmonary:      Effort: Pulmonary effort is normal. No respiratory distress.      Breath sounds:  Normal breath sounds. No stridor. No wheezing or rhonchi.      Comments: On NC  Abdominal:      General: Abdomen is flat. There is no distension.      Palpations: Abdomen is soft.      Tenderness: There is no abdominal tenderness. There is no guarding.   Musculoskeletal:         General: Swelling present. No tenderness. Normal range of motion.      Cervical back: Normal range of motion and neck supple. No rigidity or tenderness.      Right lower leg: Edema present.      Left lower leg: Edema present.      Comments: Trigger finger R middle finger  BL AKA stump edema +2  L thigh tenderness with palpation    Skin:     General: Skin is warm and dry.      Capillary Refill: Capillary refill takes less than 2 seconds.      Coloration: Skin is not jaundiced.      Findings: No erythema.   Neurological:      General: No focal deficit present.      Mental Status: She is alert and oriented to person, place, and time.   Psychiatric:         Mood and Affect: Mood normal.         Behavior: Behavior normal.           Significant Labs: All pertinent labs within the past 24 hours have been reviewed.    Significant Imaging: I have reviewed all pertinent imaging results/findings within the past 24 hours.      Assessment/Plan:      * Acute on chronic combined systolic and diastolic heart failure  Last Echo 6/21 showing     The estimated ejection fraction is 55%.   The left ventricle is normal in size with normal systolic function.   Normal left ventricular diastolic function.   Normal right ventricular size with normal right ventricular systolic function.   Mild tricuspid regurgitation.   The estimated PA systolic pressure is 31 mmHg.   Normal central venous pressure (3 mmHg).    New Echo   The left ventricle is mildly enlarged with severely decreased systolic function. The estimated ejection fraction is 15%.   There is severe left ventricular global hypokinesis.   Normal right ventricular size with low normal right  ventricular systolic function.   Grade I left ventricular diastolic dysfunction.   Biatrial enlargement.   Mild-to-moderate mitral regurgitation.   Severe tricuspid regurgitation.   The estimated PA systolic pressure is 43 mmHg.   Elevated central venous pressure (15 mmHg).           RHC  CVP 15  Wedge 25-32    Patient did not tolerate dobutamine, had chest pain. Dobutamine dc'd  Per cardiology the patients significant peripheral vascular disease prohibits advanced options    -Lasix converted to oral torsemide  -Spiranolactone added  -Metolazone 10 daily oral  -Acetazolamide 500 BID   BMPs BID  -Antihypertensive. GDMT, Anticoagulation, and diuresis management per Nephrology and Cardiology in a multidisciplinary team  -Qq4 BMPs with electrolyte repletion as needed; goal K>4, Mg>2  - 1.5L fluid restriction, low Na diet  - continue GDMT when possible  -Tylenol 1g up to 3 times a day            Myalgia  Will continue to treat pain as needed      ROSLYN (acute kidney injury)  Pt with increased Cr to 6.7 from 2.0 baseline. Possibly due to renovascular congestion from volume overload.     -Kerns  -Strict Is/Os  -Avoid nephrotoxic agents  -Daily CMPs        Acute hypoxemic respiratory failure  Resolved    CKD (chronic kidney disease), stage IV  History of CKD. See ROSLYN.      Above-knee amputation of right lower extremity  Prior history consistent with exam      Uses self-applied continuous glucose monitoring device  POCT glucose and daily CMPs    -Restart insulin for hyperglycemia      Dermatitis associated with moisture  Large body habitus with dermatitis to folds. Will monitor for infection.    -Zinc based powder  -Topical abx if indicated      Status post above knee amputation, left  History of.      Hypoalbuminemia  Pt with poor PO intake reported. Pt eats one meal a day and has associated nausea and vomiting. Will try antiemetics before feeds to improve intake. Will monitor daily intake and calorie replacement.      -Calorie count  -Diabetic/Renal diet  -Antiemetics before meals; Zofran 4 IV PRN  -Monitor Qtc for prolongation with antiemetics      Uremia  Elevating BUN to 73. Worsening insomnia. Will discuss goals and plan with nephrology.       Impaired functional mobility and endurance  PT/OT for bedbound patient      Paroxysmal atrial fibrillation  Continue home rate control  NSR on EKG  Eliquis 5mg BID          Anemia  Asymptomatic. Higher than 8 months ago. No signs of acute bleed at this time. Haptoglobin elevated.    -CBC daily  -Transfuse if <7        S/P CABG x 1  History of. Sternal scar well healed.       CAD (coronary artery disease)  History of CAD with retrosternal chest pain. EKG negative to ST changes. Nonspecific T wave inversions.     -Cardiac tele  -EKG prn  -Bidil per Cards recs  -Continue plavix, hold ASA    CAROLIN (generalized anxiety disorder)  Continue home anxiety medications      Essential hypertension  Continue home antihypertensives      Hypocalcemia  Ionized calcium at 0.76. Ca correcting at 8.4.     -Calcium acetate for calcium repletion and for phos binding.       PAD (peripheral artery disease)  History of bilateral AKA.           VTE Risk Mitigation (From admission, onward)         Ordered     apixaban tablet 5 mg  2 times daily         05/11/23 1329     IP VTE HIGH RISK PATIENT  Once         05/07/23 1547                Discharge Planning   DEVAN: 5/20/2023     Code Status: Full Code   Is the patient medically ready for discharge?: No    Reason for patient still in hospital (select all that apply): Treatment  Discharge Plan A: Home Health   Discharge Delays: None known at this time              Chandrakant Oakley MD  Department of Hospital Medicine   [facility] - Cardiology Step[facility]

## 2023-05-18 NOTE — ASSESSMENT & PLAN NOTE
ROSLYN on CKD, likely ischemic, CRS and diuresis  Baseline serum creatinine in the 2s, up to 3.9 on admission; up to 6.3 on her most recent labs  Adequate urine output while on diuretics; has made 1.4L over the past 24h  Retroperitoneal US with no hydronephrosis    ECHO with severe systolic dysfunction, EF of 15%    RHC with PCWP of 30   Continue diuresis for additional volume removal; recommend to switch to oral regimen with torsemide 100mg PO BID, Metolazone 10mg PO daily, acetazolamide 500mg PO daily and spironolactone 25mg PO daily   Consider repeating RHC during this admission to assess response to diuresis   Pt remains at high risk of requiring hemodialysis again in the near future ir persistent deterioration of her renal function   Dose medications to GFR    Avoid nephrotoxic agents as much as possible

## 2023-05-18 NOTE — PLAN OF CARE
Patient laying in bed resting comfortably at this time and appears in no acute distress. She has no complaints besides pain, medication given. Vital signs are stable. Safety measures in place and call bell within reach.     Problem: Adult Inpatient Plan of Care  Goal: Plan of Care Review  Outcome: Ongoing, Progressing  Goal: Patient-Specific Goal (Individualized)  Outcome: Ongoing, Progressing  Goal: Absence of Hospital-Acquired Illness or Injury  Outcome: Ongoing, Progressing  Goal: Optimal Comfort and Wellbeing  Outcome: Ongoing, Progressing  Goal: Readiness for Transition of Care  Outcome: Ongoing, Progressing     Problem: Fluid and Electrolyte Imbalance (Acute Kidney Injury/Impairment)  Goal: Fluid and Electrolyte Balance  Outcome: Ongoing, Progressing     Problem: Oral Intake Inadequate (Acute Kidney Injury/Impairment)  Goal: Optimal Nutrition Intake  Outcome: Ongoing, Progressing     Problem: Renal Function Impairment (Acute Kidney Injury/Impairment)  Goal: Effective Renal Function  Outcome: Ongoing, Progressing     Problem: Skin Injury Risk Increased  Goal: Skin Health and Integrity  Outcome: Ongoing, Progressing     Problem: Impaired Wound Healing  Goal: Optimal Wound Healing  Outcome: Ongoing, Progressing     Problem: Fall Injury Risk  Goal: Absence of Fall and Fall-Related Injury  Outcome: Ongoing, Progressing

## 2023-05-18 NOTE — SUBJECTIVE & OBJECTIVE
Interval History:  continuing to diurese. Increasingly lethargic on exam    Review of Systems   Constitutional:  Negative for chills, fatigue and fever.   HENT:  Negative for congestion, dental problem, facial swelling, postnasal drip, rhinorrhea, sore throat and trouble swallowing.    Eyes:  Negative for photophobia and pain.   Respiratory:  Negative for cough, choking, chest tightness, shortness of breath and wheezing.    Cardiovascular:  Positive for leg swelling. Negative for chest pain.   Gastrointestinal:  Positive for abdominal pain. Negative for abdominal distention, diarrhea, nausea and vomiting.   Endocrine: Negative.    Genitourinary:  Negative for dysuria, flank pain, frequency and hematuria.   Musculoskeletal:  Positive for myalgias. Negative for back pain.   Skin:  Negative for pallor and rash.   Allergic/Immunologic: Negative.    Neurological:  Negative for dizziness, syncope, weakness, numbness and headaches.   Psychiatric/Behavioral:  The patient is not nervous/anxious.    Objective:     Vital Signs (Most Recent):  Temp: 98.1 °F (36.7 °C) (05/18/23 1118)  Pulse: 80 (05/18/23 1118)  Resp: 18 (05/18/23 1118)  BP: (!) 121/59 (05/18/23 1118)  SpO2: (!) 94 % (05/18/23 1118) Vital Signs (24h Range):  Temp:  [97.5 °F (36.4 °C)-98.1 °F (36.7 °C)] 98.1 °F (36.7 °C)  Pulse:  [79-89] 80  Resp:  [16-20] 18  SpO2:  [93 %-98 %] 94 %  BP: (107-122)/(49-88) 121/59     Weight:  (bed scale not working)  Body mass index is 35.88 kg/m².    Intake/Output Summary (Last 24 hours) at 5/18/2023 1146  Last data filed at 5/18/2023 1143  Gross per 24 hour   Intake 544 ml   Output 1100 ml   Net -556 ml         Physical Exam  Vitals and nursing note reviewed.   Constitutional:       General: She is not in acute distress.     Appearance: Normal appearance. She is obese. She is not ill-appearing or diaphoretic.   HENT:      Head: Normocephalic and atraumatic.      Right Ear: External ear normal.      Left Ear: External ear normal.       Nose: Nose normal.      Mouth/Throat:      Mouth: Mucous membranes are moist.      Pharynx: Oropharynx is clear.   Eyes:      General: No scleral icterus.     Extraocular Movements: Extraocular movements intact.      Conjunctiva/sclera: Conjunctivae normal.      Pupils: Pupils are equal, round, and reactive to light.   Cardiovascular:      Rate and Rhythm: Normal rate and regular rhythm.      Pulses: Normal pulses.      Heart sounds: Normal heart sounds. No murmur heard.  Pulmonary:      Effort: Pulmonary effort is normal. No respiratory distress.      Breath sounds: Normal breath sounds. No stridor. No wheezing or rhonchi.      Comments: On NC  Abdominal:      General: Abdomen is flat. There is no distension.      Palpations: Abdomen is soft.      Tenderness: There is no abdominal tenderness. There is no guarding.   Musculoskeletal:         General: Swelling present. No tenderness. Normal range of motion.      Cervical back: Normal range of motion and neck supple. No rigidity or tenderness.      Right lower leg: Edema present.      Left lower leg: Edema present.      Comments: Trigger finger R middle finger  BL AKA stump edema +2  L thigh tenderness with palpation    Skin:     General: Skin is warm and dry.      Capillary Refill: Capillary refill takes less than 2 seconds.      Coloration: Skin is not jaundiced.      Findings: No erythema.   Neurological:      General: No focal deficit present.      Mental Status: She is alert and oriented to person, place, and time.   Psychiatric:         Mood and Affect: Mood normal.         Behavior: Behavior normal.           Significant Labs: All pertinent labs within the past 24 hours have been reviewed.    Significant Imaging: I have reviewed all pertinent imaging results/findings within the past 24 hours.

## 2023-05-18 NOTE — PT/OT/SLP PROGRESS
Occupational Therapy   Treatment    Name: Suyapa Connelly  MRN: [id number]  Admitting Diagnosis:  Acute on chronic combined systolic and diastolic heart failure  8 Days Post-Op    Recommendations:     Discharge Recommendations: other (see comments)  Discharge Equipment Recommendations:  drop arm commode, lift device, hospital bed  Barriers to discharge:  Other (Comment) (level of skilled assist needed)    Assessment:     Suyapa Connelly is a 57 y.o. female with a medical diagnosis of Acute on chronic combined systolic and diastolic heart failure.  She presents with extreme pain limited functional tasks and mobility, especially with pain with any touch sensations. Performance deficits affecting function are weakness, impaired cardiopulmonary response to activity, impaired endurance, pain, edema, impaired self care skills, impaired sensation, decreased safety awareness, impaired skin.     Rehab Prognosis:  Fair; patient would benefit from acute skilled OT services to address these deficits and reach maximum level of function.       Plan:     Patient to be seen 4 x/week to address the above listed problems via self-care/home management, therapeutic activities, therapeutic exercises, neuromuscular re-education  Plan of Care Expires: 06/07/23  Plan of Care Reviewed with: patient    Subjective     Chief Complaint: patient with extreme pain with any external touch  Patient/Family Comments/goals: get better   Pain/Comfort:  Pain Rating 1: 9/10  Location - Side 1: Bilateral  Location 1: leg  Pain Addressed 1: Distraction, Reposition  Pain Rating Post-Intervention 1: 9/10  Pain Rating 2: 9/10  Location 2: abdomen  Pain Addressed 2: Reposition, Distraction    Objective:     Communicated with: nursing prior to session.  Patient found HOB elevated with telemetry, peripheral IV, PureWick upon OT entry to room. Patient's family entered room during session    General Precautions: Standard, fall    Orthopedic  Precautions:N/A  Braces: N/A  Respiratory Status: Room air     Occupational Performance:     Bed Mobility:    Patient completed Rolling/Turning to Left with  contact guard assistance, with side rail, and extreme pain with touch sensations, thus alternate bed mobility method utilized    Patient completed Supine to sit with stand by assistance and HOB slightly raised to reduce pain during mobility  Patient completed sit to supine with stand by assistance and HOB slightly raised to reduce pain during mobility  Patient scooted up in the bed with bed flat with supervision using triceps extension method. Patient with pain during mobility    Therapeutic Activity:   5 reps of shoulder flexion to improve upper extremity mobility   2x5 triceps extension with verbal cues for optimal hand placement, necessary for pressure relief and functional mobility      Functional Mobility/Transfers: not attempted on this date due to functional status    Activities of Daily Living:  Grooming: stand by assistance to wash face and brush teeth while sitting EOB       [facility] 6 Click ADL: 15    Treatment & Education:    Patient educated on:   -purpose of OT and OT POC  -facilitation and education on proper body mechanics, energy conservation, and safety  -importance of early mobility and out of bed activities with staff assist  -overall benefits of therapy       Patient left HOB elevated with all lines intact, call button in reach, and family present    GOALS:   Multidisciplinary Problems       Occupational Therapy Goals          Problem: Occupational Therapy    Goal Priority Disciplines Outcome Interventions   Occupational Therapy Goal     OT, PT/OT Ongoing, Progressing    Description: Goals to be met by: 5/25     Patient will increase functional independence with ADLs by performing:    UE Dressing with Modified [place].  LE Dressing with Modified [place].  Sitting at edge of bed x10 minutes with Stand-by Assistance. - Met  Rolling to  Bilateral with Stand-by Assistance.   Supine to sit with [place].                         Time Tracking:     OT Date of Treatment: 05/18/23  OT Start Time: 1509  OT Stop Time: 1544  OT Total Time (min): 35 min    Billable Minutes:Self Care/Home Management 15  Therapeutic Activity 20    OT/MACKENZIE: OT          5/18/2023

## 2023-05-19 LAB
ANION GAP SERPL CALC-SCNC: 16 MMOL/L (ref 8–16)
ANION GAP SERPL CALC-SCNC: 18 MMOL/L (ref 8–16)
ANISOCYTOSIS BLD QL SMEAR: SLIGHT
BASOPHILS # BLD AUTO: 0.08 K/UL (ref 0–0.2)
BASOPHILS NFR BLD: 1.5 % (ref 0–1.9)
BUN SERPL-MCNC: 82 MG/DL (ref 6–20)
BUN SERPL-MCNC: 87 MG/DL (ref 6–20)
CALCIUM SERPL-MCNC: 7.9 MG/DL (ref 8.7–10.5)
CALCIUM SERPL-MCNC: 8.2 MG/DL (ref 8.7–10.5)
CHLORIDE SERPL-SCNC: 89 MMOL/L (ref 95–110)
CHLORIDE SERPL-SCNC: 91 MMOL/L (ref 95–110)
CO2 SERPL-SCNC: 27 MMOL/L (ref 23–29)
CO2 SERPL-SCNC: 29 MMOL/L (ref 23–29)
CREAT SERPL-MCNC: 7 MG/DL (ref 0.5–1.4)
CREAT SERPL-MCNC: 7.3 MG/DL (ref 0.5–1.4)
DIFFERENTIAL METHOD: ABNORMAL
EOSINOPHIL # BLD AUTO: 0.3 K/UL (ref 0–0.5)
EOSINOPHIL NFR BLD: 5.3 % (ref 0–8)
ERYTHROCYTE [DISTWIDTH] IN BLOOD BY AUTOMATED COUNT: 14.3 % (ref 11.5–14.5)
EST. GFR  (NO RACE VARIABLE): 6.1 ML/MIN/1.73 M^2
EST. GFR  (NO RACE VARIABLE): 6.4 ML/MIN/1.73 M^2
GLUCOSE SERPL-MCNC: 109 MG/DL (ref 70–110)
GLUCOSE SERPL-MCNC: 97 MG/DL (ref 70–110)
HCT VFR BLD AUTO: 32 % (ref 37–48.5)
HGB BLD-MCNC: 9.9 G/DL (ref 12–16)
HYPOCHROMIA BLD QL SMEAR: ABNORMAL
IMM GRANULOCYTES # BLD AUTO: 0.04 K/UL (ref 0–0.04)
IMM GRANULOCYTES NFR BLD AUTO: 0.8 % (ref 0–0.5)
LYMPHOCYTES # BLD AUTO: 1.3 K/UL (ref 1–4.8)
LYMPHOCYTES NFR BLD: 23.6 % (ref 18–48)
MAGNESIUM SERPL-MCNC: 1.8 MG/DL (ref 1.6–2.6)
MAGNESIUM SERPL-MCNC: 1.9 MG/DL (ref 1.6–2.6)
MCH RBC QN AUTO: 31.6 PG (ref 27–31)
MCHC RBC AUTO-ENTMCNC: 30.9 G/DL (ref 32–36)
MCV RBC AUTO: 102 FL (ref 82–98)
MONOCYTES # BLD AUTO: 0.6 K/UL (ref 0.3–1)
MONOCYTES NFR BLD: 11.9 % (ref 4–15)
NEUTROPHILS # BLD AUTO: 3 K/UL (ref 1.8–7.7)
NEUTROPHILS NFR BLD: 56.9 % (ref 38–73)
NRBC BLD-RTO: 0 /100 WBC
OVALOCYTES BLD QL SMEAR: ABNORMAL
PHOSPHATE SERPL-MCNC: 6.9 MG/DL (ref 2.7–4.5)
PHOSPHATE SERPL-MCNC: 6.9 MG/DL (ref 2.7–4.5)
PLATELET # BLD AUTO: 404 K/UL (ref 150–450)
PMV BLD AUTO: 11.2 FL (ref 9.2–12.9)
POCT GLUCOSE: 115 MG/DL (ref 70–110)
POCT GLUCOSE: 153 MG/DL (ref 70–110)
POCT GLUCOSE: 85 MG/DL (ref 70–110)
POIKILOCYTOSIS BLD QL SMEAR: SLIGHT
POLYCHROMASIA BLD QL SMEAR: ABNORMAL
POTASSIUM SERPL-SCNC: 3.8 MMOL/L (ref 3.5–5.1)
POTASSIUM SERPL-SCNC: 4 MMOL/L (ref 3.5–5.1)
RBC # BLD AUTO: 3.13 M/UL (ref 4–5.4)
SODIUM SERPL-SCNC: 134 MMOL/L (ref 136–145)
SODIUM SERPL-SCNC: 136 MMOL/L (ref 136–145)
SPHEROCYTES BLD QL SMEAR: ABNORMAL
WBC # BLD AUTO: 5.29 K/UL (ref 3.9–12.7)

## 2023-05-19 PROCEDURE — 25000003 PHARM REV CODE 250: Performed by: INTERNAL MEDICINE

## 2023-05-19 PROCEDURE — 99233 SBSQ HOSP IP/OBS HIGH 50: CPT | Mod: ,,, | Performed by: INTERNAL MEDICINE

## 2023-05-19 PROCEDURE — 99233 PR SUBSEQUENT HOSPITAL CARE,LEVL III: ICD-10-PCS | Mod: GC,,, | Performed by: STUDENT IN AN ORGANIZED HEALTH CARE EDUCATION/TRAINING PROGRAM

## 2023-05-19 PROCEDURE — 25000003 PHARM REV CODE 250

## 2023-05-19 PROCEDURE — 94761 N-INVAS EAR/PLS OXIMETRY MLT: CPT

## 2023-05-19 PROCEDURE — 99233 SBSQ HOSP IP/OBS HIGH 50: CPT | Mod: GC,,, | Performed by: STUDENT IN AN ORGANIZED HEALTH CARE EDUCATION/TRAINING PROGRAM

## 2023-05-19 PROCEDURE — 97530 THERAPEUTIC ACTIVITIES: CPT

## 2023-05-19 PROCEDURE — 25000003 PHARM REV CODE 250: Performed by: STUDENT IN AN ORGANIZED HEALTH CARE EDUCATION/TRAINING PROGRAM

## 2023-05-19 PROCEDURE — 20600001 HC STEP DOWN PRIVATE ROOM

## 2023-05-19 PROCEDURE — 85025 COMPLETE CBC W/AUTO DIFF WBC: CPT | Performed by: HOSPITALIST

## 2023-05-19 PROCEDURE — 84100 ASSAY OF PHOSPHORUS: CPT | Performed by: STUDENT IN AN ORGANIZED HEALTH CARE EDUCATION/TRAINING PROGRAM

## 2023-05-19 PROCEDURE — 80048 BASIC METABOLIC PNL TOTAL CA: CPT | Performed by: STUDENT IN AN ORGANIZED HEALTH CARE EDUCATION/TRAINING PROGRAM

## 2023-05-19 PROCEDURE — 36415 COLL VENOUS BLD VENIPUNCTURE: CPT | Performed by: STUDENT IN AN ORGANIZED HEALTH CARE EDUCATION/TRAINING PROGRAM

## 2023-05-19 PROCEDURE — 25000003 PHARM REV CODE 250: Performed by: HOSPITALIST

## 2023-05-19 PROCEDURE — 36415 COLL VENOUS BLD VENIPUNCTURE: CPT | Performed by: HOSPITALIST

## 2023-05-19 PROCEDURE — 99233 PR SUBSEQUENT HOSPITAL CARE,LEVL III: ICD-10-PCS | Mod: ,,, | Performed by: INTERNAL MEDICINE

## 2023-05-19 PROCEDURE — 83735 ASSAY OF MAGNESIUM: CPT | Performed by: STUDENT IN AN ORGANIZED HEALTH CARE EDUCATION/TRAINING PROGRAM

## 2023-05-19 RX ADMIN — CALCIUM ACETATE 1334 MG: 667 CAPSULE ORAL at 06:05

## 2023-05-19 RX ADMIN — FAMOTIDINE 20 MG: 20 TABLET ORAL at 10:05

## 2023-05-19 RX ADMIN — ACETAMINOPHEN 1000 MG: 500 TABLET ORAL at 11:05

## 2023-05-19 RX ADMIN — ALLOPURINOL 50 MG: 300 TABLET ORAL at 10:05

## 2023-05-19 RX ADMIN — HYDRALAZINE HYDROCHLORIDE 75 MG: 50 TABLET ORAL at 10:05

## 2023-05-19 RX ADMIN — ATORVASTATIN CALCIUM 80 MG: 40 TABLET, FILM COATED ORAL at 10:05

## 2023-05-19 RX ADMIN — ISOSORBIDE DINITRATE 40 MG: 20 TABLET ORAL at 09:05

## 2023-05-19 RX ADMIN — APIXABAN 5 MG: 5 TABLET, FILM COATED ORAL at 10:05

## 2023-05-19 RX ADMIN — AMITRIPTYLINE HYDROCHLORIDE 50 MG: 25 TABLET, FILM COATED ORAL at 09:05

## 2023-05-19 RX ADMIN — HYDROMORPHONE HYDROCHLORIDE 2 MG: 2 TABLET ORAL at 11:05

## 2023-05-19 RX ADMIN — CARVEDILOL 6.25 MG: 6.25 TABLET, FILM COATED ORAL at 09:05

## 2023-05-19 RX ADMIN — ISOSORBIDE DINITRATE 40 MG: 20 TABLET ORAL at 10:05

## 2023-05-19 RX ADMIN — SERTRALINE HYDROCHLORIDE 50 MG: 50 TABLET ORAL at 10:05

## 2023-05-19 RX ADMIN — CALCITRIOL CAPSULES 0.25 MCG 1 MCG: 0.25 CAPSULE ORAL at 10:05

## 2023-05-19 RX ADMIN — CLOPIDOGREL BISULFATE 75 MG: 75 TABLET ORAL at 10:05

## 2023-05-19 RX ADMIN — CARVEDILOL 6.25 MG: 6.25 TABLET, FILM COATED ORAL at 10:05

## 2023-05-19 RX ADMIN — APIXABAN 5 MG: 5 TABLET, FILM COATED ORAL at 09:05

## 2023-05-19 NOTE — SUBJECTIVE & OBJECTIVE
Interval History: Pt more somnolent this morning. Unable to quickly answer questions. Seems to be more encephalopathic this morning. BUN and Cr increasing.     Review of Systems   Constitutional:  Negative for chills, fatigue and fever.   HENT:  Negative for congestion, dental problem, facial swelling, postnasal drip, rhinorrhea, sore throat and trouble swallowing.    Eyes:  Negative for photophobia and pain.   Respiratory:  Negative for cough, choking, chest tightness, shortness of breath and wheezing.    Cardiovascular:  Positive for leg swelling. Negative for chest pain.   Gastrointestinal:  Positive for abdominal pain. Negative for abdominal distention, diarrhea, nausea and vomiting.   Endocrine: Negative.    Genitourinary:  Negative for dysuria, flank pain, frequency and hematuria.   Musculoskeletal:  Positive for myalgias. Negative for back pain.   Skin:  Negative for pallor and rash.   Allergic/Immunologic: Negative.    Neurological:  Negative for dizziness, syncope, weakness, numbness and headaches.   Psychiatric/Behavioral:  The patient is not nervous/anxious.    Objective:     Vital Signs (Most Recent):  Temp: 97.7 °F (36.5 °C) (05/19/23 1131)  Pulse: 82 (05/19/23 1131)  Resp: 17 (05/19/23 1131)  BP: (!) 111/55 (05/19/23 1131)  SpO2: 95 % (05/19/23 1131) Vital Signs (24h Range):  Temp:  [97.3 °F (36.3 °C)-97.9 °F (36.6 °C)] 97.7 °F (36.5 °C)  Pulse:  [74-83] 82  Resp:  [17-19] 17  SpO2:  [95 %-100 %] 95 %  BP: (106-123)/(52-60) 111/55     Weight:  (bed scale not working)  Body mass index is 35.88 kg/m².    Intake/Output Summary (Last 24 hours) at 5/19/2023 1325  Last data filed at 5/19/2023 0619  Gross per 24 hour   Intake 365 ml   Output 700 ml   Net -335 ml         Physical Exam  Vitals and nursing note reviewed.   Constitutional:       General: She is not in acute distress.     Appearance: Normal appearance. She is obese. She is not ill-appearing or diaphoretic.   HENT:      Head: Normocephalic and  atraumatic.      Right Ear: External ear normal.      Left Ear: External ear normal.      Nose: Nose normal.      Mouth/Throat:      Mouth: Mucous membranes are moist.      Pharynx: Oropharynx is clear.   Eyes:      General: No scleral icterus.     Extraocular Movements: Extraocular movements intact.      Conjunctiva/sclera: Conjunctivae normal.      Pupils: Pupils are equal, round, and reactive to light.   Cardiovascular:      Rate and Rhythm: Normal rate and regular rhythm.      Pulses: Normal pulses.      Heart sounds: Normal heart sounds. No murmur heard.  Pulmonary:      Effort: Pulmonary effort is normal. No respiratory distress.      Breath sounds: Normal breath sounds. No stridor. No wheezing or rhonchi.      Comments: On NC  Abdominal:      General: Abdomen is flat. There is no distension.      Palpations: Abdomen is soft.      Tenderness: There is no abdominal tenderness. There is no guarding.   Musculoskeletal:         General: Swelling present. No tenderness. Normal range of motion.      Cervical back: Normal range of motion and neck supple. No rigidity or tenderness.      Right lower leg: Edema present.      Left lower leg: Edema present.      Comments: Trigger finger R middle finger  BL AKA stump edema +2  L thigh tenderness with palpation    Skin:     General: Skin is warm and dry.      Capillary Refill: Capillary refill takes less than 2 seconds.      Coloration: Skin is not jaundiced.      Findings: No erythema.   Neurological:      Mental Status: She is oriented to person, place, and time. She is lethargic.   Psychiatric:         Mood and Affect: Mood normal.         Behavior: Behavior normal.           Significant Labs: All pertinent labs within the past 24 hours have been reviewed.    Significant Imaging: I have reviewed all pertinent imaging results/findings within the past 24 hours.

## 2023-05-19 NOTE — PROGRESS NOTES
[facility]rade - Cardiology [facility] Medicine  Progress Note    Patient Name: Suyapa Connelly  MRN: [id number]  Patient Class: IP- Inpatient   Admission Date: 5/1/2023  Length of Stay: 16 days  Attending Physician: Batsheva Torres DO  Primary Care Provider: Magen Christensen MD        Subjective:     Principal Problem:Acute on chronic combined systolic and diastolic heart failure        HPI:  57 y.o. female with past medical history of CAD s/p CABG, CKD, DM, HTN, HLD, s/p bilateral AKA who presnets with chest pain, difficulty breathing, leg swelling.     Per ED She has been unable to access any of her medications for the last 3 months. Over the last few months she has noted worsening shortness of breath, particulatly orthopnea, and leg swelling. In the last two weeks she endorses associated abdominal pain and swelling, reduced appetite, and pleuritic chest pain with deep inspiration. She endorses occasional lighthededness with changes in position. Last week she had an episode where she was unable to breathe for about two minutes which was very upsetting for her and her , but she recovered and did not seek care at that time. Today, she complains of significant difficulty breathing, swelling in her legs and abdomen, and reduced appetite.     No headaches, visual changes, URI symptoms, palpitations, nausea, vomiting, diarrhea, blood in her stool, dysuria, hematuria, numbness or weakness in her extremities. She does endorse occasional paresthesias associated with her chronic phantom limb pain. She additionally has had increased difficulties with depression recently, denies suicidal or homicidal ideation, but has had considerable stress and low mood.      Of note, the patient saw a new primary care provider 4/21 for similar concerns who recommended she proceed to the ED at that time, but they were unable due to financial and geographic concerns. She lives two hours away from [facility] and her  cares for her and  elderly mother with dementia. She has had decreased access to medical care over the last year, and unable to access most of her prescriptions. She had a 90 day supply of four medications (lasix, statin, clopidogrel, sertraline) but these were accidentally lost and she has been unable to afford a replacement. They present today for evaluation and to reestablish care and access to her prescriptions.     Labs on admission significant for an elevated BNP, hypocalcemia which corrected is 8.3, significant elevation in Creatinine and a slight elevation of troponin    CXR  Cardiomegaly with pulmonary interstitial edema, suggestive of pulmonary edema secondary to CHF.      Overview/Hospital Course:  Diuresis initiated with Lasix IV.  1.2L UOP in the past 24 hours.  Vitals reassuring with adequate sats on ambient air.  Still very edematious; Cardiology recs lasix gtt.  GDMT initiated. Pt given 2mg morphine for moderate to severe pain in legs from swelling. Diuresis increased to 40mg/hr due to inadequate urine output. Cr continued to increase. Cr stable at new Lasix rate. Diuresed 1.9L with some improvement in clinical status. S/p RHC that showed wedge of 26-32 with CVP of 15. Will continue diuresis with Lasix with the addition of metolazone and acetazolamide. Discussions had with nephrology about electrolyte abnormalities. GOC discussions underway.       Interval History: Pt more somnolent this morning. Unable to quickly answer questions. Seems to be more encephalopathic this morning. BUN and Cr increasing.     Review of Systems   Constitutional:  Negative for chills, fatigue and fever.   HENT:  Negative for congestion, dental problem, facial swelling, postnasal drip, rhinorrhea, sore throat and trouble swallowing.    Eyes:  Negative for photophobia and pain.   Respiratory:  Negative for cough, choking, chest tightness, shortness of breath and wheezing.    Cardiovascular:  Positive for leg swelling. Negative for chest pain.    Gastrointestinal:  Positive for abdominal pain. Negative for abdominal distention, diarrhea, nausea and vomiting.   Endocrine: Negative.    Genitourinary:  Negative for dysuria, flank pain, frequency and hematuria.   Musculoskeletal:  Positive for myalgias. Negative for back pain.   Skin:  Negative for pallor and rash.   Allergic/Immunologic: Negative.    Neurological:  Negative for dizziness, syncope, weakness, numbness and headaches.   Psychiatric/Behavioral:  The patient is not nervous/anxious.    Objective:     Vital Signs (Most Recent):  Temp: 97.7 °F (36.5 °C) (05/19/23 1131)  Pulse: 82 (05/19/23 1131)  Resp: 17 (05/19/23 1131)  BP: (!) 111/55 (05/19/23 1131)  SpO2: 95 % (05/19/23 1131) Vital Signs (24h Range):  Temp:  [97.3 °F (36.3 °C)-97.9 °F (36.6 °C)] 97.7 °F (36.5 °C)  Pulse:  [74-83] 82  Resp:  [17-19] 17  SpO2:  [95 %-100 %] 95 %  BP: (106-123)/(52-60) 111/55     Weight:  (bed scale not working)  Body mass index is 35.88 kg/m².    Intake/Output Summary (Last 24 hours) at 5/19/2023 1325  Last data filed at 5/19/2023 0619  Gross per 24 hour   Intake 365 ml   Output 700 ml   Net -335 ml         Physical Exam  Vitals and nursing note reviewed.   Constitutional:       General: She is not in acute distress.     Appearance: Normal appearance. She is obese. She is not ill-appearing or diaphoretic.   HENT:      Head: Normocephalic and atraumatic.      Right Ear: External ear normal.      Left Ear: External ear normal.      Nose: Nose normal.      Mouth/Throat:      Mouth: Mucous membranes are moist.      Pharynx: Oropharynx is clear.   Eyes:      General: No scleral icterus.     Extraocular Movements: Extraocular movements intact.      Conjunctiva/sclera: Conjunctivae normal.      Pupils: Pupils are equal, round, and reactive to light.   Cardiovascular:      Rate and Rhythm: Normal rate and regular rhythm.      Pulses: Normal pulses.      Heart sounds: Normal heart sounds. No murmur heard.  Pulmonary:       Effort: Pulmonary effort is normal. No respiratory distress.      Breath sounds: Normal breath sounds. No stridor. No wheezing or rhonchi.      Comments: On NC  Abdominal:      General: Abdomen is flat. There is no distension.      Palpations: Abdomen is soft.      Tenderness: There is no abdominal tenderness. There is no guarding.   Musculoskeletal:         General: Swelling present. No tenderness. Normal range of motion.      Cervical back: Normal range of motion and neck supple. No rigidity or tenderness.      Right lower leg: Edema present.      Left lower leg: Edema present.      Comments: Trigger finger R middle finger  BL AKA stump edema +2  L thigh tenderness with palpation    Skin:     General: Skin is warm and dry.      Capillary Refill: Capillary refill takes less than 2 seconds.      Coloration: Skin is not jaundiced.      Findings: No erythema.   Neurological:      Mental Status: She is oriented to person, place, and time. She is lethargic.   Psychiatric:         Mood and Affect: Mood normal.         Behavior: Behavior normal.           Significant Labs: All pertinent labs within the past 24 hours have been reviewed.    Significant Imaging: I have reviewed all pertinent imaging results/findings within the past 24 hours.      Assessment/Plan:      * Acute on chronic combined systolic and diastolic heart failure  Last Echo 6/21 showing     The estimated ejection fraction is 55%.   The left ventricle is normal in size with normal systolic function.   Normal left ventricular diastolic function.   Normal right ventricular size with normal right ventricular systolic function.   Mild tricuspid regurgitation.   The estimated PA systolic pressure is 31 mmHg.   Normal central venous pressure (3 mmHg).    New Echo   The left ventricle is mildly enlarged with severely decreased systolic function. The estimated ejection fraction is 15%.   There is severe left ventricular global hypokinesis.   Normal right  ventricular size with low normal right ventricular systolic function.   Grade I left ventricular diastolic dysfunction.   Biatrial enlargement.   Mild-to-moderate mitral regurgitation.   Severe tricuspid regurgitation.   The estimated PA systolic pressure is 43 mmHg.   Elevated central venous pressure (15 mmHg).           RHC  CVP 15  Wedge 25-32    Patient did not tolerate dobutamine, had chest pain. Dobutamine dc'd  Per cardiology the patients significant peripheral vascular disease prohibits advanced options    -Holding diuretics per nephrology  -Lasix converted to oral torsemide  -Metolazone 10 daily oral  -Acetazolamide 500 BID   BMPs BID  -Antihypertensive. GDMT, Anticoagulation, and diuresis management per Nephrology and Cardiology in a multidisciplinary team  -Qq4 BMPs with electrolyte repletion as needed; goal K>4, Mg>2  - 1.5L fluid restriction, low Na diet  - continue GDMT when possible  -Tylenol 1g up to 3 times a day            Myalgia  Will continue to treat pain as needed      ROSLYN (acute kidney injury)  Pt with increased Cr to 6.7 from 2.0 baseline. Possibly due to renovascular congestion from volume overload.     -Kerns  -Strict Is/Os  -Avoid nephrotoxic agents  -Daily CMPs        Acute hypoxemic respiratory failure  Resolved    CKD (chronic kidney disease), stage IV  History of CKD. See ROSLYN.      Above-knee amputation of right lower extremity  Prior history consistent with exam      Uses self-applied continuous glucose monitoring device  POCT glucose and daily CMPs    -Restart insulin for hyperglycemia      Dermatitis associated with moisture  Large body habitus with dermatitis to folds. Will monitor for infection.    -Zinc based powder  -Topical abx if indicated      Status post above knee amputation, left  History of.      Hypoalbuminemia  Pt with poor PO intake reported. Pt eats one meal a day and has associated nausea and vomiting. Will try antiemetics before feeds to improve intake. Will  monitor daily intake and calorie replacement.     -Calorie count  -Diabetic/Renal diet  -Antiemetics before meals; Zofran 4 IV PRN  -Monitor Qtc for prolongation with antiemetics      Uremia  Elevating BUN to 87. Worsening insomnia. Will discuss goals and plan with nephrology.       Impaired functional mobility and endurance  PT/OT for bedbound patient      Paroxysmal atrial fibrillation  Continue home rate control  NSR on EKG  Eliquis 5mg BID          Anemia  Asymptomatic. Higher than 8 months ago. No signs of acute bleed at this time. Haptoglobin elevated.    -CBC daily  -Transfuse if <7        S/P CABG x 1  History of. Sternal scar well healed.       CAD (coronary artery disease)  History of CAD with retrosternal chest pain. EKG negative to ST changes. Nonspecific T wave inversions.     -Cardiac tele  -EKG prn  -Bidil per Cards recs  -Continue plavix, hold ASA    CAROLIN (generalized anxiety disorder)  Continue home anxiety medications      Essential hypertension  Continue home antihypertensives      Hypocalcemia  Ionized calcium at 0.76. Ca correcting at 8.4.     -Calcium acetate for calcium repletion and for phos binding.       PAD (peripheral artery disease)  History of bilateral AKA.           VTE Risk Mitigation (From admission, onward)         Ordered     apixaban tablet 5 mg  2 times daily         05/11/23 1329     IP VTE HIGH RISK PATIENT  Once         05/07/23 1547                Discharge Planning   DEVAN: 5/23/2023     Code Status: Full Code   Is the patient medically ready for discharge?: No    Reason for patient still in hospital (select all that apply): Patient trending condition and Consult recommendations  Discharge Plan A: Home Health   Discharge Delays: None known at this time        Onur Bueno MD  Department of Hospital Medicine   [facility] - Cardiology Stepdown

## 2023-05-19 NOTE — PLAN OF CARE
[facility] - Cardiology Stepdown  Discharge Reassessment    Primary Care Provider: Magen Christensen MD    Expected Discharge Date: 5/23/2023    Reassessment (most recent)       Discharge Reassessment - 05/19/23 1513          Discharge Reassessment    Assessment Type Discharge Planning Reassessment     Discharge Plan discussed with: Patient;Spouse/sig other     Communicated DEVAN with patient/caregiver Date not available/Unable to determine     Discharge Plan A Home Health     Discharge Plan B Other     DME Needed Upon Discharge  3-in-1 commode;lift device;hospital bed     Transition of Care Barriers None     Why the patient remains in the hospital Requires continued medical care                 Per treatment team discharge disposition is pending medical clearance.  SW will continue to follow.      Jenna Bonilla LMSW  [facility]  [id number]

## 2023-05-19 NOTE — PROGRESS NOTES
[facility] - Cardiology Stepdown  Nephrology  Progress Note    Patient Name: Suyapa Connelly  MRN: [id number]  Admission Date: 5/1/2023  Hospital Length of Stay: 16 days  Attending Provider: Batsheva Torres DO   Primary Care Physician: Magen Christensen MD  Principal Problem:Acute on chronic combined systolic and diastolic heart failure    Subjective:     HPI: 57 y.o. female with past medical history of CAD s/p CABG, CKD, DM, HTN, HLD, s/p bilateral AKA who presnets with chest pain, difficulty breathing, leg swelling. Her most recent ECHO revealed severe systolic dysfunction with EF of 15%. She has been started on diuretics with IV lasix and PO metolazone with adequate urinary response however her renal function has been deteriorating throughout the admission. She reports requiring dialysis at one time before, around the time she underwent her AKAs. Her baseline serum creatinine is in the 2s and it has trended up to 5 during this admission. Nephrology consulted for further evaluation of ROSLYN on CKD.         Interval History: Seen and evaluated by bedside. Diuretic regimen switched from IV to oral.  Serum Cr up to 7.3 this AM.     Review of patient's allergies indicates:   Allergen Reactions    Ciprofloxacin Itching    Contrast media      Kidney injury    Iodine      Kidney injury     Current Facility-Administered Medications   Medication Frequency    acetaminophen tablet 1,000 mg Q8H PRN    acetaZOLAMIDE tablet 500 mg BID    allopurinol split tablet 50 mg Every other day    amitriptyline tablet 50 mg QHS    apixaban tablet 5 mg BID    atorvastatin tablet 80 mg Daily    calcitRIOL capsule 1 mcg Daily    calcium acetate(phosphat bind) capsule 1,334 mg TID WM    carvediloL tablet 6.25 mg BID    clopidogreL tablet 75 mg Daily    dextrose 10% bolus 125 mL 125 mL PRN    dextrose 10% bolus 250 mL 250 mL PRN    dextrose 40 % gel 15,000 mg PRN    dextrose 40 % gel 30,000 mg PRN    famotidine tablet 20 mg Daily    glucagon (human  recombinant) injection 1 mg PRN    hydrALAZINE tablet 75 mg TID    HYDROmorphone tablet 2 mg BID PRN    insulin aspart U-100 pen 0-5 Units QID (AC + HS) PRN    insulin detemir U-100 (Levemir) pen 8 Units QHS    isosorbide dinitrate tablet 40 mg TID    melatonin tablet 6 mg Nightly PRN    metOLazone tablet 10 mg Daily    naloxone 0.4 mg/mL injection 0.02 mg PRN    ondansetron disintegrating tablet 8 mg Q8H PRN    sertraline tablet 50 mg Daily    sodium chloride 0.9% flush 10 mL Q12H PRN    sodium chloride 0.9% flush 10 mL PRN    torsemide tablet 100 mg BID loop       Objective:     Vital Signs (Most Recent):  Temp: 97.6 °F (36.4 °C) (05/19/23 0401)  Pulse: 78 (05/19/23 0401)  Resp: 19 (05/19/23 0401)  BP: 123/60 (05/19/23 0401)  SpO2: 95 % (05/19/23 0401) Vital Signs (24h Range):  Temp:  [97.3 °F (36.3 °C)-98.1 °F (36.7 °C)] 97.6 °F (36.4 °C)  Pulse:  [74-82] 78  Resp:  [18-19] 19  SpO2:  [94 %-100 %] 95 %  BP: (106-123)/(52-60) 123/60     Weight:  (bed scale not working) (05/19/23 0517)  Body mass index is 35.88 kg/m².  Body surface area is 2.12 meters squared.    I/O last 3 completed shifts:  In: 687 [P.O.:687]  Out: 2150 [Urine:2150]    Physical Exam  Vitals and nursing note reviewed.   Constitutional:       General: She is not in acute distress.     Appearance: Normal appearance. She is obese. She is not ill-appearing or diaphoretic.   HENT:      Head: Normocephalic and atraumatic.      Right Ear: External ear normal.      Left Ear: External ear normal.      Nose: Nose normal.      Mouth/Throat:      Mouth: Mucous membranes are moist.      Pharynx: Oropharynx is clear.   Eyes:      General: No scleral icterus.     Extraocular Movements: Extraocular movements intact.      Conjunctiva/sclera: Conjunctivae normal.      Pupils: Pupils are equal, round, and reactive to light.   Cardiovascular:      Rate and Rhythm: Normal rate and regular rhythm.      Pulses: Normal pulses.      Heart sounds: Normal heart sounds. No  murmur heard.  Pulmonary:      Effort: Pulmonary effort is normal. No respiratory distress.      Breath sounds: Normal breath sounds. No stridor. No wheezing or rhonchi.      Comments: On NC  Abdominal:      General: Abdomen is flat. There is no distension.      Palpations: Abdomen is soft.      Tenderness: There is no abdominal tenderness. There is no guarding.   Musculoskeletal:         General: Swelling present. No tenderness. Normal range of motion.      Cervical back: Normal range of motion and neck supple. No rigidity or tenderness.      Right lower leg: Edema present.      Left lower leg: Edema present.      Comments: Trigger finger R middle finger  BL AKA stump edema +2  L thigh tenderness with palpation    Skin:     General: Skin is warm and dry.      Capillary Refill: Capillary refill takes less than 2 seconds.      Coloration: Skin is not jaundiced.      Findings: No erythema.   Neurological:      General: No focal deficit present.      Mental Status: She is alert and oriented to person, place, and time.   Psychiatric:         Mood and Affect: Mood normal.         Behavior: Behavior normal.        Significant Labs:  CBC:   Recent Labs   Lab 05/18/23 0428   WBC 4.86   RBC 3.00*   HGB 9.1*   HCT 31.4*      *   MCH 30.3   MCHC 29.0*       CMP:   Recent Labs   Lab 05/18/23  0428 05/18/23  1835    120*   CALCIUM 7.4* 8.2*   ALBUMIN 2.3*  --    PROT 7.2  --    * 135*   K 4.4 3.8   CO2 25 27   CL 93* 93*   BUN 80* 87*   CREATININE 6.7* 6.7*   ALKPHOS 181*  --    ALT 6*  --    AST 25  --    BILITOT 0.3  --             Assessment/Plan:     Cardiac/Vascular  * Acute on chronic combined systolic and diastolic heart failure  Management per primary       S/P CABG x 1  Management per primary       Renal/  ROSLYN (acute kidney injury)  ROSLYN on CKD, likely ischemic, CRS and diuresis  Baseline serum creatinine in the 2s, up to 3.9 on admission; up to 7.3on her most recent labs  Adequate urine  output while on diuretics; has made 1L over the past 24h  Retroperitoneal US with no hydronephrosis    ECHO with severe systolic dysfunction, EF of 15%    RHC with PCWP of 30   Will hold diuretics for the next 24-48h while monitoring for improvement in her renal function   Consider repeating RHC during this admission to assess response to diuresis   Pt remains at high risk of requiring hemodialysis in the near future if no improvement of her renal function   Dose medications to GFR    Avoid nephrotoxic agents as much as possible              Jeremy Juan MD  Nephrology  [facility]

## 2023-05-19 NOTE — ASSESSMENT & PLAN NOTE
Last Echo 6/21 showing     The estimated ejection fraction is 55%.   The left ventricle is normal in size with normal systolic function.   Normal left ventricular diastolic function.   Normal right ventricular size with normal right ventricular systolic function.   Mild tricuspid regurgitation.   The estimated PA systolic pressure is 31 mmHg.   Normal central venous pressure (3 mmHg).    New Echo   The left ventricle is mildly enlarged with severely decreased systolic function. The estimated ejection fraction is 15%.   There is severe left ventricular global hypokinesis.   Normal right ventricular size with low normal right ventricular systolic function.   Grade I left ventricular diastolic dysfunction.   Biatrial enlargement.   Mild-to-moderate mitral regurgitation.   Severe tricuspid regurgitation.   The estimated PA systolic pressure is 43 mmHg.   Elevated central venous pressure (15 mmHg).           RHC  CVP 15  Wedge 25-32    Patient did not tolerate dobutamine, had chest pain. Dobutamine dc'd  Per cardiology the patients significant peripheral vascular disease prohibits advanced options    -Holding diuretics per nephrology  -Lasix converted to oral torsemide  -Metolazone 10 daily oral  -Acetazolamide 500 BID   BMPs BID  -Antihypertensive. GDMT, Anticoagulation, and diuresis management per Nephrology and Cardiology in a multidisciplinary team  -Qq4 BMPs with electrolyte repletion as needed; goal K>4, Mg>2  - 1.5L fluid restriction, low Na diet  - continue GDMT when possible  -Tylenol 1g up to 3 times a day

## 2023-05-19 NOTE — ASSESSMENT & PLAN NOTE
ROSLYN on CKD, likely ischemic, CRS and diuresis  Baseline serum creatinine in the 2s, up to 3.9 on admission; up to 6.7 on her most recent labs  Adequate urine output while on diuretics; has made 1L over the past 24h  Retroperitoneal US with no hydronephrosis    ECHO with severe systolic dysfunction, EF of 15%    RHC with PCWP of 30   Continue diuresis for additional volume removal; continue torsemide 100mg PO BID, Metolazone 10mg PO daily, acetazolamide 500mg PO daily, can add spironolactone 25mg PO daily as well   Consider repeating RHC during this admission to assess response to diuresis   Pt remains at high risk of requiring hemodialysis again in the near future ir persistent deterioration of her renal function   Dose medications to GFR    Avoid nephrotoxic agents as much as possible

## 2023-05-19 NOTE — PLAN OF CARE
General Cardiology Plan of Care    Examined patient. Clinically appears improved based on assessment 1 week ago but still with volume overload. Patient is failing diuretic therapy w/ worsening renal failure and incomplete improvement of ADHF. Do not believe RHC will serve clinical benefit of assessing volume status at this time. Recommend continuing diuresis with high dose IV diuretics, as well as consideration of HD for volume removal. We are happy to re-assess for any clinical change.       Discussed this case with MD Olga Marte MD PGY-1

## 2023-05-19 NOTE — PT/OT/SLP PROGRESS
Physical Therapy  Treatment    Patient Name:  Suyapa Connelly   MRN:  [id number]    Recommendations:     Discharge Recommendations:  other (see comments)   Discharge Equipment Recommendations: drop arm commode, lift device, hospital bed   Barriers to discharge: decreased functional mobility and fall risk    Assessment:     Suyapa Connelly is a 57 y.o. female admitted with a medical diagnosis of Acute on chronic combined systolic and diastolic heart failure.  Pt demonstrates the below listed impairments with decreased tolerance to functional mobility, weakness, and pain being the most limiting.  Pt demonstrates fairly poor tolerance to mobility and is unable to participate in transferring to her w/c.  Pt requires skilled PT due to patient's status as: a fall risk and patient has increased pain to allow safe d/c home.     Impairments and functional limitations:  weakness, impaired endurance, impaired self care skills, impaired functional mobility, impaired balance, decreased upper extremity function, decreased lower extremity function, pain, impaired cardiopulmonary response to activity.  These deficits affect their roles and responsibilities in which they were able to complete prior to admit.  Rehab Prognosis:   Fair; patient would benefit from acute skilled PT services 3 x/week to address these deficits, improve quality of life, focus on recovery of impairments, provide patient/caregiver education, reduce fall risk, and reach maximum level of function.  Pt is moderately motivated to participated in skilled PT.    Recent Surgery:   Procedure(s) (LRB):  INSERTION, CATHETER, RIGHT HEART (Right)  Insertion, Catheter, Central Venous, Hemodialysis 9 Days Post-Op    Plan:     During this hospitalization, patient to be seen 3 x/week to address the identified rehab impairments via therapeutic activities, therapeutic exercises, neuromuscular re-education, wheelchair management/training and progress toward the following  goals:    Plan of Care Expires:  06/18/23    Subjective     Chief Complaint: decreased tolerance to functional mobility  Patient/Family Comments/Goals: Return home  Pain/Comfort:  Pain Rating 1: 9/10  Location - Side 1: Left  Location - Orientation 1: generalized  Location 1: hip  Pain Addressed 1: Reposition, Distraction, Cessation of Activity, Nurse notified  Pain Rating Post-Intervention 1: 10/10    Objective:     Communicated with RN prior to session.  Patient found HOB elevated with PureWick, telemetry upon PT entry to room.     General Precautions: Standard, fall   Orthopedic Precautions:N/A   Braces: N/A  Oxygen Device:      Functional Mobility:  Bed Mobility:  Rolling Left: SBA  Scooting: SBA  Supine to Sit: SBA  Sit to Supine: SBA  Head of bed position: HOB elevated  EOB with SUP  Pt requests to perform bed mobility without assistance     Transfers:  n/a, pt not safe for transfers  Session spent attempting a bed to w/c transfer with a slide board however her pain is limiting her participation.  Attempted several trials, all ended by pain    Gait: n/a    Balance:   Position Score Time   Static Sitting FAIR-: Maintains without assist but is inconsistent 8 minute(s)   Dynamic Sitting FAIR-: Maintains without assist but is inconsistent 6 minute(s)   Static Standing n/a: dependent n/a   Dynamic Standing n/a: dependent n/a       AM-PAC 6 CLICK MOBILITY  Turning over in bed (including adjusting bedclothes, sheets and blankets)?: 3  Sitting down on and standing up from a chair with arms (e.g., wheelchair, bedside commode, etc.): 1  Moving from lying on back to sitting on the side of the bed?: 3  Moving to and from a bed to a chair (including a wheelchair)?: 1  Need to walk in hospital room?: 1  Climbing 3-5 steps with a railing?: 1  Basic Mobility Total Score: 10     Therapeutic Activities:  Patient educated on role of acute care PT and PT POC, safety while in hospital including calling nurse for mobility, call  light usage, assistive device use, transfers, positioning, posture, risks of prolonged bed rest, and benefits of continued PT by explanation and demonstration.    Patient demonstrates fair understanding of education provided this day.   Whiteboard updated    Therapeutic Exercises:  n/a    Patient left HOB elevated with all lines intact, call button in reach, RN notified, and RN present.    GOALS:   Multidisciplinary Problems       Physical Therapy Goals          Problem: Physical Therapy    Goal Priority Disciplines Outcome Goal Variances Interventions   Physical Therapy Goal     PT, PT/OT Ongoing, Progressing     Description: Goals to be met by: 2023     Patient will increase functional independence with mobility by performin. Supine to sit with Minimal Assistance.  2. Sit to supine with Minimal Assistance.  3. Bed to chair transfer with Minimal Assistance using LRAD.  4. Wheelchair propulsion x50 feet with Minimal Assistance using bilateral upper extremities.  5. Spouse verbalizes and demonstrates understanding on family training with focus on transfers.                          Time Tracking:     PT Received On: 23  PT Start Time: 1454     PT Stop Time: 1521  PT Total Time (min): 27 min     Billable Minutes: Therapeutic Activity 23    Treatment Type: Treatment  PT/PTA: PT     Number of PTA visits since last PT visit: 0     2023

## 2023-05-19 NOTE — SUBJECTIVE & OBJECTIVE
Interval History: Seen and evaluated by bedside. Diuretic regimen switched from IV to oral.  Will continue trending renal function.     Review of patient's allergies indicates:   Allergen Reactions    Ciprofloxacin Itching    Contrast media      Kidney injury    Iodine      Kidney injury     Current Facility-Administered Medications   Medication Frequency    acetaminophen tablet 1,000 mg Q8H PRN    acetaZOLAMIDE tablet 500 mg BID    allopurinol split tablet 50 mg Every other day    amitriptyline tablet 50 mg QHS    apixaban tablet 5 mg BID    atorvastatin tablet 80 mg Daily    calcitRIOL capsule 1 mcg Daily    calcium acetate(phosphat bind) capsule 1,334 mg TID WM    carvediloL tablet 6.25 mg BID    clopidogreL tablet 75 mg Daily    dextrose 10% bolus 125 mL 125 mL PRN    dextrose 10% bolus 250 mL 250 mL PRN    dextrose 40 % gel 15,000 mg PRN    dextrose 40 % gel 30,000 mg PRN    famotidine tablet 20 mg Daily    glucagon (human recombinant) injection 1 mg PRN    hydrALAZINE tablet 75 mg TID    HYDROmorphone tablet 2 mg BID PRN    insulin aspart U-100 pen 0-5 Units QID (AC + HS) PRN    insulin detemir U-100 (Levemir) pen 8 Units QHS    isosorbide dinitrate tablet 40 mg TID    melatonin tablet 6 mg Nightly PRN    metOLazone tablet 10 mg Daily    naloxone 0.4 mg/mL injection 0.02 mg PRN    ondansetron disintegrating tablet 8 mg Q8H PRN    sertraline tablet 50 mg Daily    sodium chloride 0.9% flush 10 mL Q12H PRN    sodium chloride 0.9% flush 10 mL PRN    torsemide tablet 100 mg BID loop       Objective:     Vital Signs (Most Recent):  Temp: 97.6 °F (36.4 °C) (05/19/23 0401)  Pulse: 78 (05/19/23 0401)  Resp: 19 (05/19/23 0401)  BP: 123/60 (05/19/23 0401)  SpO2: 95 % (05/19/23 0401) Vital Signs (24h Range):  Temp:  [97.3 °F (36.3 °C)-98.1 °F (36.7 °C)] 97.6 °F (36.4 °C)  Pulse:  [74-82] 78  Resp:  [18-19] 19  SpO2:  [94 %-100 %] 95 %  BP: (106-123)/(52-60) 123/60     Weight:  (bed scale not working) (05/19/23 7509)  Body  mass index is 35.88 kg/m².  Body surface area is 2.12 meters squared.    I/O last 3 completed shifts:  In: 687 [P.O.:687]  Out: 2150 [Urine:2150]     Physical Exam  Vitals and nursing note reviewed.   Constitutional:       General: She is not in acute distress.     Appearance: Normal appearance. She is obese. She is not ill-appearing or diaphoretic.   HENT:      Head: Normocephalic and atraumatic.      Right Ear: External ear normal.      Left Ear: External ear normal.      Nose: Nose normal.      Mouth/Throat:      Mouth: Mucous membranes are moist.      Pharynx: Oropharynx is clear.   Eyes:      General: No scleral icterus.     Extraocular Movements: Extraocular movements intact.      Conjunctiva/sclera: Conjunctivae normal.      Pupils: Pupils are equal, round, and reactive to light.   Cardiovascular:      Rate and Rhythm: Normal rate and regular rhythm.      Pulses: Normal pulses.      Heart sounds: Normal heart sounds. No murmur heard.  Pulmonary:      Effort: Pulmonary effort is normal. No respiratory distress.      Breath sounds: Normal breath sounds. No stridor. No wheezing or rhonchi.      Comments: On NC  Abdominal:      General: Abdomen is flat. There is no distension.      Palpations: Abdomen is soft.      Tenderness: There is no abdominal tenderness. There is no guarding.   Musculoskeletal:         General: Swelling present. No tenderness. Normal range of motion.      Cervical back: Normal range of motion and neck supple. No rigidity or tenderness.      Right lower leg: Edema present.      Left lower leg: Edema present.      Comments: Trigger finger R middle finger  BL AKA stump edema +2  L thigh tenderness with palpation    Skin:     General: Skin is warm and dry.      Capillary Refill: Capillary refill takes less than 2 seconds.      Coloration: Skin is not jaundiced.      Findings: No erythema.   Neurological:      General: No focal deficit present.      Mental Status: She is alert and oriented to  person, place, and time.   Psychiatric:         Mood and Affect: Mood normal.         Behavior: Behavior normal.        Significant Labs:  CBC:   Recent Labs   Lab 05/18/23 0428   WBC 4.86   RBC 3.00*   HGB 9.1*   HCT 31.4*      *   MCH 30.3   MCHC 29.0*       CMP:   Recent Labs   Lab 05/18/23 0428 05/18/23  1835    120*   CALCIUM 7.4* 8.2*   ALBUMIN 2.3*  --    PROT 7.2  --    * 135*   K 4.4 3.8   CO2 25 27   CL 93* 93*   BUN 80* 87*   CREATININE 6.7* 6.7*   ALKPHOS 181*  --    ALT 6*  --    AST 25  --    BILITOT 0.3  --

## 2023-05-20 PROBLEM — I50.9 CONGESTIVE HEART FAILURE: Status: ACTIVE | Noted: 2023-05-20

## 2023-05-20 LAB
ANION GAP SERPL CALC-SCNC: 17 MMOL/L (ref 8–16)
ANION GAP SERPL CALC-SCNC: 18 MMOL/L (ref 8–16)
BASOPHILS # BLD AUTO: 0.09 K/UL (ref 0–0.2)
BASOPHILS NFR BLD: 1.6 % (ref 0–1.9)
BUN SERPL-MCNC: 89 MG/DL (ref 6–20)
BUN SERPL-MCNC: 92 MG/DL (ref 6–20)
CALCIUM SERPL-MCNC: 7.8 MG/DL (ref 8.7–10.5)
CALCIUM SERPL-MCNC: 8.1 MG/DL (ref 8.7–10.5)
CHLORIDE SERPL-SCNC: 93 MMOL/L (ref 95–110)
CHLORIDE SERPL-SCNC: 94 MMOL/L (ref 95–110)
CO2 SERPL-SCNC: 23 MMOL/L (ref 23–29)
CO2 SERPL-SCNC: 24 MMOL/L (ref 23–29)
CREAT SERPL-MCNC: 7.2 MG/DL (ref 0.5–1.4)
CREAT SERPL-MCNC: 7.4 MG/DL (ref 0.5–1.4)
DIFFERENTIAL METHOD: ABNORMAL
EOSINOPHIL # BLD AUTO: 0.3 K/UL (ref 0–0.5)
EOSINOPHIL NFR BLD: 5.6 % (ref 0–8)
ERYTHROCYTE [DISTWIDTH] IN BLOOD BY AUTOMATED COUNT: 13.8 % (ref 11.5–14.5)
EST. GFR  (NO RACE VARIABLE): 6 ML/MIN/1.73 M^2
EST. GFR  (NO RACE VARIABLE): 6.2 ML/MIN/1.73 M^2
GLUCOSE SERPL-MCNC: 98 MG/DL (ref 70–110)
GLUCOSE SERPL-MCNC: 99 MG/DL (ref 70–110)
HCT VFR BLD AUTO: 29.7 % (ref 37–48.5)
HGB BLD-MCNC: 8.5 G/DL (ref 12–16)
IMM GRANULOCYTES # BLD AUTO: 0.01 K/UL (ref 0–0.04)
IMM GRANULOCYTES NFR BLD AUTO: 0.2 % (ref 0–0.5)
LYMPHOCYTES # BLD AUTO: 1 K/UL (ref 1–4.8)
LYMPHOCYTES NFR BLD: 16.6 % (ref 18–48)
MAGNESIUM SERPL-MCNC: 1.9 MG/DL (ref 1.6–2.6)
MAGNESIUM SERPL-MCNC: 2 MG/DL (ref 1.6–2.6)
MCH RBC QN AUTO: 30.2 PG (ref 27–31)
MCHC RBC AUTO-ENTMCNC: 28.6 G/DL (ref 32–36)
MCV RBC AUTO: 106 FL (ref 82–98)
MONOCYTES # BLD AUTO: 0.9 K/UL (ref 0.3–1)
MONOCYTES NFR BLD: 15.3 % (ref 4–15)
NEUTROPHILS # BLD AUTO: 3.5 K/UL (ref 1.8–7.7)
NEUTROPHILS NFR BLD: 60.7 % (ref 38–73)
NRBC BLD-RTO: 0 /100 WBC
PHOSPHATE SERPL-MCNC: 6.9 MG/DL (ref 2.7–4.5)
PHOSPHATE SERPL-MCNC: 7 MG/DL (ref 2.7–4.5)
PLATELET # BLD AUTO: 389 K/UL (ref 150–450)
PMV BLD AUTO: 10.4 FL (ref 9.2–12.9)
POCT GLUCOSE: 118 MG/DL (ref 70–110)
POCT GLUCOSE: 94 MG/DL (ref 70–110)
POCT GLUCOSE: 96 MG/DL (ref 70–110)
POTASSIUM SERPL-SCNC: 3.8 MMOL/L (ref 3.5–5.1)
POTASSIUM SERPL-SCNC: 4.1 MMOL/L (ref 3.5–5.1)
RBC # BLD AUTO: 2.81 M/UL (ref 4–5.4)
SODIUM SERPL-SCNC: 133 MMOL/L (ref 136–145)
SODIUM SERPL-SCNC: 136 MMOL/L (ref 136–145)
WBC # BLD AUTO: 5.74 K/UL (ref 3.9–12.7)

## 2023-05-20 PROCEDURE — 25000003 PHARM REV CODE 250: Performed by: STUDENT IN AN ORGANIZED HEALTH CARE EDUCATION/TRAINING PROGRAM

## 2023-05-20 PROCEDURE — 25000003 PHARM REV CODE 250: Performed by: INTERNAL MEDICINE

## 2023-05-20 PROCEDURE — 25000003 PHARM REV CODE 250: Performed by: HOSPITALIST

## 2023-05-20 PROCEDURE — 36415 COLL VENOUS BLD VENIPUNCTURE: CPT | Performed by: STUDENT IN AN ORGANIZED HEALTH CARE EDUCATION/TRAINING PROGRAM

## 2023-05-20 PROCEDURE — 80048 BASIC METABOLIC PNL TOTAL CA: CPT | Performed by: STUDENT IN AN ORGANIZED HEALTH CARE EDUCATION/TRAINING PROGRAM

## 2023-05-20 PROCEDURE — 99233 PR SUBSEQUENT HOSPITAL CARE,LEVL III: ICD-10-PCS | Mod: GC,,, | Performed by: STUDENT IN AN ORGANIZED HEALTH CARE EDUCATION/TRAINING PROGRAM

## 2023-05-20 PROCEDURE — 99232 PR SUBSEQUENT HOSPITAL CARE,LEVL II: ICD-10-PCS | Mod: ,,, | Performed by: HOSPITALIST

## 2023-05-20 PROCEDURE — 85025 COMPLETE CBC W/AUTO DIFF WBC: CPT | Performed by: HOSPITALIST

## 2023-05-20 PROCEDURE — 83735 ASSAY OF MAGNESIUM: CPT | Performed by: STUDENT IN AN ORGANIZED HEALTH CARE EDUCATION/TRAINING PROGRAM

## 2023-05-20 PROCEDURE — 84100 ASSAY OF PHOSPHORUS: CPT | Mod: 91 | Performed by: STUDENT IN AN ORGANIZED HEALTH CARE EDUCATION/TRAINING PROGRAM

## 2023-05-20 PROCEDURE — 25000003 PHARM REV CODE 250

## 2023-05-20 PROCEDURE — 20600001 HC STEP DOWN PRIVATE ROOM

## 2023-05-20 PROCEDURE — 99233 SBSQ HOSP IP/OBS HIGH 50: CPT | Mod: GC,,, | Performed by: STUDENT IN AN ORGANIZED HEALTH CARE EDUCATION/TRAINING PROGRAM

## 2023-05-20 PROCEDURE — 99232 SBSQ HOSP IP/OBS MODERATE 35: CPT | Mod: ,,, | Performed by: HOSPITALIST

## 2023-05-20 RX ORDER — SEVELAMER CARBONATE 800 MG/1
800 TABLET, FILM COATED ORAL
Status: DISCONTINUED | OUTPATIENT
Start: 2023-05-20 | End: 2023-05-25

## 2023-05-20 RX ORDER — CALCITRIOL 0.25 UG/1
0.5 CAPSULE ORAL DAILY
Status: DISCONTINUED | OUTPATIENT
Start: 2023-05-21 | End: 2023-05-29 | Stop reason: HOSPADM

## 2023-05-20 RX ADMIN — CARVEDILOL 6.25 MG: 6.25 TABLET, FILM COATED ORAL at 09:05

## 2023-05-20 RX ADMIN — CARVEDILOL 6.25 MG: 6.25 TABLET, FILM COATED ORAL at 08:05

## 2023-05-20 RX ADMIN — CLOPIDOGREL BISULFATE 75 MG: 75 TABLET ORAL at 09:05

## 2023-05-20 RX ADMIN — CALCIUM ACETATE 1334 MG: 667 CAPSULE ORAL at 06:05

## 2023-05-20 RX ADMIN — SEVELAMER CARBONATE 800 MG: 800 TABLET, FILM COATED ORAL at 06:05

## 2023-05-20 RX ADMIN — AMITRIPTYLINE HYDROCHLORIDE 50 MG: 25 TABLET, FILM COATED ORAL at 08:05

## 2023-05-20 RX ADMIN — APIXABAN 5 MG: 5 TABLET, FILM COATED ORAL at 08:05

## 2023-05-20 RX ADMIN — FAMOTIDINE 20 MG: 20 TABLET ORAL at 09:05

## 2023-05-20 RX ADMIN — ISOSORBIDE DINITRATE 40 MG: 20 TABLET ORAL at 06:05

## 2023-05-20 RX ADMIN — CALCITRIOL CAPSULES 0.25 MCG 1 MCG: 0.25 CAPSULE ORAL at 09:05

## 2023-05-20 RX ADMIN — HYDRALAZINE HYDROCHLORIDE 75 MG: 50 TABLET ORAL at 09:05

## 2023-05-20 RX ADMIN — HYDRALAZINE HYDROCHLORIDE 75 MG: 50 TABLET ORAL at 08:05

## 2023-05-20 RX ADMIN — INSULIN DETEMIR 8 UNITS: 100 INJECTION, SOLUTION SUBCUTANEOUS at 09:05

## 2023-05-20 RX ADMIN — SEVELAMER CARBONATE 800 MG: 800 TABLET, FILM COATED ORAL at 12:05

## 2023-05-20 RX ADMIN — APIXABAN 5 MG: 5 TABLET, FILM COATED ORAL at 09:05

## 2023-05-20 RX ADMIN — ISOSORBIDE DINITRATE 40 MG: 20 TABLET ORAL at 09:05

## 2023-05-20 RX ADMIN — SERTRALINE HYDROCHLORIDE 50 MG: 50 TABLET ORAL at 09:05

## 2023-05-20 RX ADMIN — CALCIUM ACETATE 1334 MG: 667 CAPSULE ORAL at 12:05

## 2023-05-20 RX ADMIN — CALCIUM ACETATE 1334 MG: 667 CAPSULE ORAL at 09:05

## 2023-05-20 RX ADMIN — ATORVASTATIN CALCIUM 80 MG: 40 TABLET, FILM COATED ORAL at 09:05

## 2023-05-20 NOTE — PROGRESS NOTES
[facility] - Cardiology [facility] Medicine  Progress Note    Patient Name: Suyapa Connelly  MRN: [id number]  Patient Class: IP- Inpatient   Admission Date: 5/1/2023  Length of Stay: 17 days  Attending Physician: Batsheva Torres DO  Primary Care Provider: Magen Christensen MD        Subjective:     Principal Problem:Acute on chronic combined systolic and diastolic heart failure        HPI:  57 y.o. female with past medical history of CAD s/p CABG, CKD, DM, HTN, HLD, s/p bilateral AKA who presnets with chest pain, difficulty breathing, leg swelling.     Per ED She has been unable to access any of her medications for the last 3 months. Over the last few months she has noted worsening shortness of breath, particulatly orthopnea, and leg swelling. In the last two weeks she endorses associated abdominal pain and swelling, reduced appetite, and pleuritic chest pain with deep inspiration. She endorses occasional lighthededness with changes in position. Last week she had an episode where she was unable to breathe for about two minutes which was very upsetting for her and her , but she recovered and did not seek care at that time. Today, she complains of significant difficulty breathing, swelling in her legs and abdomen, and reduced appetite.     No headaches, visual changes, URI symptoms, palpitations, nausea, vomiting, diarrhea, blood in her stool, dysuria, hematuria, numbness or weakness in her extremities. She does endorse occasional paresthesias associated with her chronic phantom limb pain. She additionally has had increased difficulties with depression recently, denies suicidal or homicidal ideation, but has had considerable stress and low mood.      Of note, the patient saw a new primary care provider 4/21 for similar concerns who recommended she proceed to the ED at that time, but they were unable due to financial and geographic concerns. She lives two hours away from [facility] and her  cares for her and  elderly mother with dementia. She has had decreased access to medical care over the last year, and unable to access most of her prescriptions. She had a 90 day supply of four medications (lasix, statin, clopidogrel, sertraline) but these were accidentally lost and she has been unable to afford a replacement. They present today for evaluation and to reestablish care and access to her prescriptions.     Labs on admission significant for an elevated BNP, hypocalcemia which corrected is 8.3, significant elevation in Creatinine and a slight elevation of troponin    CXR  Cardiomegaly with pulmonary interstitial edema, suggestive of pulmonary edema secondary to CHF.      Overview/Hospital Course:  Diuresis initiated with Lasix IV.  1.2L UOP in the past 24 hours.  Vitals reassuring with adequate sats on ambient air.  Still very edematious; Cardiology recs lasix gtt.  GDMT initiated. Pt given 2mg morphine for moderate to severe pain in legs from swelling. Diuresis increased to 40mg/hr due to inadequate urine output. Cr continued to increase. Cr stable at new Lasix rate. Diuresed 1.9L with some improvement in clinical status. S/p RHC that showed wedge of 26-32 with CVP of 15. Will continue diuresis with Lasix with the addition of metolazone and acetazolamide. Discussions had with nephrology about electrolyte abnormalities. GOC discussions underway.       Interval History: Pt more somnolent this morning. Unable to quickly answer questions. Seems to be more encephalopathic this morning. BUN and Cr increasing. Diuretics being held per nephrology. When patient was not encephalopathic she indicated that she would want HD to extend her life regardless of quality.      Review of Systems   Constitutional:  Negative for chills, fatigue and fever.   HENT:  Negative for congestion, dental problem, facial swelling, postnasal drip, rhinorrhea, sore throat and trouble swallowing.    Eyes:  Negative for photophobia and pain.   Respiratory:   Negative for cough, choking, chest tightness, shortness of breath and wheezing.    Cardiovascular:  Positive for leg swelling. Negative for chest pain.   Gastrointestinal:  Positive for abdominal pain. Negative for abdominal distention, diarrhea, nausea and vomiting.   Endocrine: Negative.    Genitourinary:  Negative for dysuria, flank pain, frequency and hematuria.   Musculoskeletal:  Positive for myalgias. Negative for back pain.   Skin:  Negative for pallor and rash.   Allergic/Immunologic: Negative.    Neurological:  Negative for dizziness, syncope, weakness, numbness and headaches.   Psychiatric/Behavioral:  The patient is not nervous/anxious.    Objective:      Vitals:    05/20/23 0008 05/20/23 0549 05/20/23 0602 05/20/23 0833   BP:  (!) 121/56  138/68   BP Location:  Right arm  Left arm   Patient Position:  Lying  Sitting   Pulse: 82 78  84   Resp:  18  20   Temp:  97.5 °F (36.4 °C)  98 °F (36.7 °C)   TempSrc:  Oral  Tympanic   SpO2:  98%  98%   Weight:   Comment: bed scale not working    Height:          Weight:  (bed scale not working)  Body mass index is 35.88 kg/m².     I/O last 3 completed shifts:  In: 1182 [P.O.:1182]  Out: 650 [Urine:650]  No intake/output data recorded.         Physical Exam  Vitals and nursing note reviewed.   Constitutional:       General: She is not in acute distress.     Appearance: Normal appearance. She is obese. She is not ill-appearing or diaphoretic.   HENT:      Head: Normocephalic and atraumatic.      Right Ear: External ear normal.      Left Ear: External ear normal.      Nose: Nose normal.      Mouth/Throat:      Mouth: Mucous membranes are moist.      Pharynx: Oropharynx is clear.   Eyes:      General: No scleral icterus.     Extraocular Movements: Extraocular movements intact.      Conjunctiva/sclera: Conjunctivae normal.      Pupils: Pupils are equal, round, and reactive to light.   Cardiovascular:      Rate and Rhythm: Normal rate and regular rhythm.      Pulses:  Normal pulses.      Heart sounds: Normal heart sounds. No murmur heard.  Pulmonary:      Effort: Pulmonary effort is normal. No respiratory distress.      Breath sounds: Normal breath sounds. No stridor. No wheezing or rhonchi.      Comments: On NC  Abdominal:      General: Abdomen is flat. There is no distension.      Palpations: Abdomen is soft.      Tenderness: There is no abdominal tenderness. There is no guarding.   Musculoskeletal:         General: Swelling present. No tenderness. Normal range of motion.      Cervical back: Normal range of motion and neck supple. No rigidity or tenderness.      Right lower leg: Edema present.      Left lower leg: Edema present.      Comments: Trigger finger R middle finger  BL AKA stump edema +2  L thigh tenderness with palpation    Skin:     General: Skin is warm and dry.      Capillary Refill: Capillary refill takes less than 2 seconds.      Coloration: Skin is not jaundiced.      Findings: No erythema.   Neurological:      Mental Status: She is oriented to person, place, and time. She is lethargic.   Psychiatric:         Mood and Affect: Mood normal.         Behavior: Behavior normal.             Significant Labs: All pertinent labs within the past 24 hours have been reviewed.     Significant Imaging: I have reviewed all pertinent imaging results/findings within the past 24 hours.    Assessment/Plan:      * Acute on chronic combined systolic and diastolic heart failure  Last Echo 6/21 showing    The estimated ejection fraction is 55%.  The left ventricle is normal in size with normal systolic function.  Normal left ventricular diastolic function.  Normal right ventricular size with normal right ventricular systolic function.  Mild tricuspid regurgitation.  The estimated PA systolic pressure is 31 mmHg.  Normal central venous pressure (3 mmHg).    New Echo  The left ventricle is mildly enlarged with severely decreased systolic function. The estimated ejection fraction is  15%.  There is severe left ventricular global hypokinesis.  Normal right ventricular size with low normal right ventricular systolic function.  Grade I left ventricular diastolic dysfunction.  Biatrial enlargement.  Mild-to-moderate mitral regurgitation.  Severe tricuspid regurgitation.  The estimated PA systolic pressure is 43 mmHg.  Elevated central venous pressure (15 mmHg).           RHC  CVP 15  Wedge 25-32    Patient did not tolerate dobutamine, had chest pain. Dobutamine dc'd  Per cardiology the patients significant peripheral vascular disease prohibits advanced options    -Holding diuretics per nephrology  -Lasix converted to oral torsemide  -Metolazone 10 daily oral  -Acetazolamide 500 BID   BMPs BID  -Antihypertensive. GDMT, Anticoagulation, and diuresis management per Nephrology and Cardiology in a multidisciplinary team  -Qq4 BMPs with electrolyte repletion as needed; goal K>4, Mg>2  - 1.5L fluid restriction, low Na diet  - continue GDMT when possible  -Tylenol 1g up to 3 times a day            Myalgia  Will continue to treat pain as needed      ROSLYN (acute kidney injury)  Pt with increased Cr to 6.7 from 2.0 baseline. Possibly due to renovascular congestion from volume overload.     -Kerns  -Strict Is/Os  -Avoid nephrotoxic agents  -Daily CMPs        Acute hypoxemic respiratory failure  Resolved    CKD (chronic kidney disease), stage IV  History of CKD. See ROSLYN.      Above-knee amputation of right lower extremity  Prior history consistent with exam      Uses self-applied continuous glucose monitoring device  POCT glucose and daily CMPs    -Restart insulin for hyperglycemia      Dermatitis associated with moisture  Large body habitus with dermatitis to folds. Will monitor for infection.    -Zinc based powder  -Topical abx if indicated      Status post above knee amputation, left  History of.      Hypoalbuminemia  Pt with poor PO intake reported. Pt eats one meal a day and has associated nausea and  vomiting. Will try antiemetics before feeds to improve intake. Will monitor daily intake and calorie replacement.     -Calorie count  -Diabetic/Renal diet  -Antiemetics before meals; Zofran 4 IV PRN  -Monitor Qtc for prolongation with antiemetics      Uremia  Elevating BUN to 87. Worsening insomnia. Will discuss goals and plan with nephrology.       Impaired functional mobility and endurance  PT/OT for bedbound patient      Paroxysmal atrial fibrillation  Continue home rate control  NSR on EKG  Eliquis 5mg BID          Anemia  Asymptomatic. Higher than 8 months ago. No signs of acute bleed at this time. Haptoglobin elevated.    -CBC daily  -Transfuse if <7        S/P CABG x 1  History of. Sternal scar well healed.       CAD (coronary artery disease)  History of CAD with retrosternal chest pain. EKG negative to ST changes. Nonspecific T wave inversions.     -Cardiac tele  -EKG prn  -Bidil per Cards recs  -Continue plavix, hold ASA    CAROLIN (generalized anxiety disorder)  Continue home anxiety medications      Essential hypertension  Continue home antihypertensives      Hypocalcemia  Ionized calcium at 0.76. Ca correcting at 8.4.     -Calcium acetate for calcium repletion and for phos binding.       PAD (peripheral artery disease)  History of bilateral AKA.           VTE Risk Mitigation (From admission, onward)           Ordered     apixaban tablet 5 mg  2 times daily         05/11/23 1329     IP VTE HIGH RISK PATIENT  Once         05/07/23 1547                    Discharge Planning   DEVAN: 5/23/2023     Code Status: Full Code   Is the patient medically ready for discharge?: No    Reason for patient still in hospital (select all that apply): Patient trending condition and Consult recommendations  Discharge Plan A: Home Health   Discharge Delays: None known at this time        Onur Bueno MD  Department of Hospital Medicine   [facility] - Cardiology Stepdown

## 2023-05-20 NOTE — PROGRESS NOTES
Suyapa Connelly   MR#:[id number]   RM:[id number] A  :1965  AGE:57 y.o.     Progress Note  Nephrology    Admit Date: 2023  Length of Stay: 17  Consult Requested By: Batsheva Torres DO  Reason for Consult: ROSLYN on CKD stage 4     HPI: 57 y.o. female with past medical history of CAD s/p CABG, CKD, DM, HTN, HLD, s/p bilateral AKA who presnets with chest pain, difficulty breathing, leg swelling. Her most recent ECHO revealed severe systolic dysfunction with EF of 15%. She has been started on diuretics with IV lasix and PO metolazone with adequate urinary response however her renal function has been deteriorating throughout the admission. She reports requiring dialysis at one time before, around the time she underwent her AKAs. Her baseline serum creatinine is in the 2s and it has trended up to 5 during this admission. Nephrology consulted for further evaluation of ROSLYN on CKD.            Interval History: Seen and evaluated by bedside. Serum creatinine at 7.4 mg/dl this AM which is worsening. Pt is awake and oriented times 3. Denies nausea or other uremic signs. Only 400 cc UOP recorded in the past 24 hours. Pt is not in respiratory distress or short of breath and is on room air.      Review of Systems:  Constitutional: denies fever/weakness  Respiratory: denies SOB, cough   Cardiovascular: denies chest pain/palpitations   Gastrointestinal: denies N/V, diarrhea/constipation   Psych: denies confusion, depression  Others:    Patient Active Problem List   Diagnosis    Peripheral vascular disease    PAD (peripheral artery disease)    Hypocalcemia    Essential hypertension    Vitamin D deficiency    CAROLIN (generalized anxiety disorder)    Mixed hyperlipidemia    CAD (coronary artery disease)    S/P CABG x 1    Anemia    Paroxysmal atrial fibrillation    Hyponatremia    Critical lower limb ischemia    S/P femoral-popliteal bypass surgery    Thrombosis of femoro-popliteal bypass graft    Impaired functional mobility and  endurance    Nephrotic syndrome    Uremia    Vascular graft infection    Hypoalbuminemia    Acute on chronic combined systolic and diastolic heart failure    Postmenopausal bleeding    Uterine fibroid    Muscle wasting and atrophy, not elsewhere classified, right thigh     Adjustment disorder with mixed anxiety and depressed mood    Phantom limb syndrome with pain    Status post above knee amputation, left    Peripheral neuropathic pain    Impaired eye movement    Myoclonus    Dermatitis associated with moisture    Uses self-applied continuous glucose monitoring device    Status post above-knee amputation of both lower extremities    Leg swelling    Hyperkalemia    Debility    Metabolic acidosis    Above-knee amputation of right lower extremity    CKD (chronic kidney disease), stage IV    Acute hypoxemic respiratory failure    ROSLYN (acute kidney injury)    Myalgia     Past Medical History:   Diagnosis Date    Anxiety     Chronic pain syndrome     CKD (chronic kidney disease), stage III     Depression     Diabetes mellitus     type 2    Diabetes mellitus, type 2     GERD (gastroesophageal reflux disease)     Hyperemesis 3/23/2021    Hyperlipemia     Hypertension     Hypokalemia 3/23/2021    Infection of below knee amputation stump 3/12/2022    Myocardial infarction 2010    minor-caused by stress per pt.    Osteomyelitis     Osteomyelitis of left foot 4/30/2021    PVD (peripheral vascular disease)     Ulcer of left foot     Vaginal delivery     x1        OBJECTIVE:   Temp:  [97.5 °F (36.4 °C)-98.2 °F (36.8 °C)]   Pulse:  [78-87]   Resp:  [16-21]   BP: (110-138)/(56-70)   SpO2:  [95 %-98 %]       Intake/Output Summary (Last 24 hours) at 5/20/2023 1507  Last data filed at 5/20/2023 0616  Gross per 24 hour   Intake 480 ml   Output 0 ml   Net 480 ml         Physical Exam:  General Appearance: well developed, well nourished   HEENT: normocephalic, atraumatic    Eyes: conjunctivae/corneas clear. PERRL.   Oropharynx:  MMM  Pulm:  Effort: normal   Auscultation: CTA B, no crackles/wheezes  Card:   Palpation:   Auscultation: RRR, S1/S2 nml   Ext. Edema:   GI: Tenderness/Masses:    Other: None       Laboratory:  CBC with Diff:   Recent Labs   Lab 05/18/23  0428 05/19/23  0610 05/20/23  0403   WBC 4.86 5.29 5.74   HGB 9.1* 9.9* 8.5*   HCT 31.4* 32.0* 29.7*    404 389   LYMPH 21.0  1.0 23.6  1.3 16.6*  1.0   MONO 14.2  0.7 11.9  0.6 15.3*  0.9   EOSINOPHIL 4.3 5.3 5.6     COAG:  No results for input(s): APTT, INR, PTT in the last 168 hours.    CMP:   Recent Labs   Lab 05/16/23  0519 05/17/23  0427 05/18/23  0428 05/18/23  1835 05/19/23  0858 05/19/23  1522 05/20/23  0403   * 109 104   < > 97 109 99   CALCIUM 6.5* 7.0* 7.4*   < > 8.2* 7.9* 8.1*   ALBUMIN 2.2* 2.2* 2.3*  --   --   --   --    PROT 6.5 6.7 7.2  --   --   --   --     134* 134*   < > 136 134* 133*   K 3.7 3.5 4.4   < > 3.8 4.0 3.8   CO2 27 25 25   < > 27 29 23   CL 95 93* 93*   < > 91* 89* 93*   BUN 73* 78* 80*   < > 87* 82* 89*   CREATININE 6.3* 6.6* 6.7*   < > 7.3* 7.0* 7.4*   ALKPHOS 194* 179* 181*  --   --   --   --    ALT 10 7* 6*  --   --   --   --    AST 25 22 25  --   --   --   --    BILITOT 0.2 0.3 0.3  --   --   --   --    MG  --   --   --    < > 1.9 1.8 1.9   PHOS 6.5*  --  6.7*   < > 6.9* 6.9* 7.0*    < > = values in this interval not displayed.     Estimated Creatinine Clearance: 9.8 mL/min (A) (based on SCr of 7.4 mg/dL (H)).  Corrected Calcium:      Cardiac markers: No results for input(s): CKMB, TROPONINT, MYOGLOBIN in the last 168 hours.  PT, PTT, INR    ABG  Recent Labs   Lab 05/13/23  1509 05/17/23  1331   PH 7.408 7.373   PO2 56 55   PCO2 50.4* 49.4*   HCO3 31.8* 28.7*   BE 7 3       Urinalysis:  No results for input(s): COLORU, CLARITYU, SPECGRAV, PHUR, PROTEINUA, GLUCOSEU, BILIRUBINCON, BLOODU, WBCU, RBCU, BACTERIA, MUCUS, NITRITE, LEUKOCYTESUR, UROBILINOGEN, HYALINECASTS in the last 24 hours.  CMP:   Recent Labs   Lab  05/20/23  0403   GLU 99   CALCIUM 8.1*   *   K 3.8   CO2 23   CL 93*   BUN 89*   CREATININE 7.4*     LFTs: No results for input(s): ALT, AST, ALKPHOS, BILITOT, PROT, ALBUMIN in the last 24 hours.  Coagulation: No results for input(s): PT, INR, APTT in the last 24 hours.  Cardiac Markers: No results for input(s): CKMB, TROPONINT, MYOGLOBIN in the last 24 hours.        Hemoglobin A1C   Date Value Ref Range Status   05/02/2023 6.2 (H) 4.0 - 5.6 % Final     Comment:     ADA Screening Guidelines:  5.7-6.4%  Consistent with prediabetes  >or=6.5%  Consistent with diabetes    High levels of fetal hemoglobin interfere with the HbA1C  assay. Heterozygous hemoglobin variants (HbS, HgC, etc)do  not significantly interfere with this assay.   However, presence of multiple variants may affect accuracy.     03/26/2022 6.9 (H) 4.0 - 5.6 % Final     Comment:     ADA Screening Guidelines:  5.7-6.4%  Consistent with prediabetes  >or=6.5%  Consistent with diabetes    High levels of fetal hemoglobin interfere with the HbA1C  assay. Heterozygous hemoglobin variants (HbS, HgC, etc)do  not significantly interfere with this assay.   However, presence of multiple variants may affect accuracy.     03/18/2022 7.0 (H) 4.0 - 5.6 % Final     Comment:     ADA Screening Guidelines:  5.7-6.4%  Consistent with prediabetes  >or=6.5%  Consistent with diabetes    High levels of fetal hemoglobin interfere with the HbA1C  assay. Heterozygous hemoglobin variants (HbS, HgC, etc)do  not significantly interfere with this assay.   However, presence of multiple variants may affect accuracy.             ACCESS    ASSESSMENT/PLAN:   Principle Problem:  Acute on chronic combined systolic and diastolic heart failure  Active Hospital Problems    Diagnosis  POA    *Acute on chronic combined systolic and diastolic heart failure [I50.43]  Yes    Myalgia [M79.10]  No    Acute hypoxemic respiratory failure [J96.01]  Yes    ROSLYN (acute kidney injury) [N17.9]  Yes     CKD (chronic kidney disease), stage IV [N18.4]  Yes     Chronic    Uses self-applied continuous glucose monitoring device [Z97.8]  Yes    Dermatitis associated with moisture [L30.8]  Yes    Above-knee amputation of right lower extremity [S78.111A]  Yes    Status post above knee amputation, left [Z89.612]  Not Applicable    Hypoalbuminemia [E88.09]  Yes    Uremia [N19]  Yes    Impaired functional mobility and endurance [Z74.09]  Yes    Paroxysmal atrial fibrillation [I48.0]  Yes    S/P CABG x 1 [Z95.1]  Not Applicable    Anemia [D64.9]  Yes    CAD (coronary artery disease) [I25.10]  Yes     Formatting of this note might be different from the original.  Last Assessment & Plan:   Formatting of this note might be different from the original.  -- Optimize glucose control.        CAROLIN (generalized anxiety disorder) [F41.1]  Yes    Essential hypertension [I10]  Yes    Hypocalcemia [E83.51]  Yes    PAD (peripheral artery disease) [I73.9]  Yes      Resolved Hospital Problems   No resolved problems to display.       A/P:    1. ROSLYN on CKD stage 4 secondary to acute heart failure excerebration nd cardio renal syndrome.  ROSLYN on CKD, likely ischemic, CRS and diuresis  Baseline serum creatinine in the 2s, up to 3.9 on admission; up to 7.4 mg/dL on her most recent labs  Retroperitoneal US with no hydronephrosis    ECHO with severe systolic dysfunction, EF of 15%    Pt is negative 16 lit since admission  Given worsening renal functions, would recommend diuretic holiday today and re evaluate renal functions   Recommend getting a snow catheter for accurate  Is and Os  Might need to consider RRT early next week if pt continues to remain oliguric with worsening renal functions and volume overload situation.      RHC with PCWP of 30      Hyperphosphatemia ; secondary to renal failure    Pt remains at high risk of requiring hemodialysis again in the near future if persistent deterioration of renal function      Thankyou for the consult.  Will follow along.    Lisa Cooley MD  Nephrology  [facility].

## 2023-05-20 NOTE — PLAN OF CARE
Problem: Adult Inpatient Plan of Care  Goal: Plan of Care Review  Outcome: Ongoing, Progressing  Goal: Patient-Specific Goal (Individualized)  Outcome: Ongoing, Progressing  Goal: Absence of Hospital-Acquired Illness or Injury  Outcome: Ongoing, Progressing  Goal: Optimal Comfort and Wellbeing  Outcome: Ongoing, Progressing  Goal: Readiness for Transition of Care  Outcome: Ongoing, Progressing     Problem: Fluid and Electrolyte Imbalance (Acute Kidney Injury/Impairment)  Goal: Fluid and Electrolyte Balance  Outcome: Ongoing, Progressing     Problem: Oral Intake Inadequate (Acute Kidney Injury/Impairment)  Goal: Optimal Nutrition Intake  Outcome: Ongoing, Progressing     Problem: Renal Function Impairment (Acute Kidney Injury/Impairment)  Goal: Effective Renal Function  Outcome: Ongoing, Progressing     Problem: Skin Injury Risk Increased  Goal: Skin Health and Integrity  Outcome: Ongoing, Progressing     Problem: Impaired Wound Healing  Goal: Optimal Wound Healing  Outcome: Ongoing, Progressing     Problem: Fall Injury Risk  Goal: Absence of Fall and Fall-Related Injury  Outcome: Ongoing, Progressing   Pt is A+Ox4.  asked that patient be able to sleep in longer and not be bothered. Pt had decreased food intake.

## 2023-05-21 LAB
ANION GAP SERPL CALC-SCNC: 17 MMOL/L (ref 8–16)
ANION GAP SERPL CALC-SCNC: 18 MMOL/L (ref 8–16)
APTT PPP: 31.2 SEC (ref 21–32)
APTT PPP: 82.8 SEC (ref 21–32)
BASOPHILS # BLD AUTO: 0.09 K/UL (ref 0–0.2)
BASOPHILS NFR BLD: 1.6 % (ref 0–1.9)
BUN SERPL-MCNC: 90 MG/DL (ref 6–20)
BUN SERPL-MCNC: 91 MG/DL (ref 6–20)
CALCIUM SERPL-MCNC: 8.1 MG/DL (ref 8.7–10.5)
CALCIUM SERPL-MCNC: 8.7 MG/DL (ref 8.7–10.5)
CHLORIDE SERPL-SCNC: 89 MMOL/L (ref 95–110)
CHLORIDE SERPL-SCNC: 93 MMOL/L (ref 95–110)
CO2 SERPL-SCNC: 25 MMOL/L (ref 23–29)
CO2 SERPL-SCNC: 26 MMOL/L (ref 23–29)
CREAT SERPL-MCNC: 7.3 MG/DL (ref 0.5–1.4)
CREAT SERPL-MCNC: 7.6 MG/DL (ref 0.5–1.4)
DIFFERENTIAL METHOD: ABNORMAL
EOSINOPHIL # BLD AUTO: 0.2 K/UL (ref 0–0.5)
EOSINOPHIL NFR BLD: 4.3 % (ref 0–8)
ERYTHROCYTE [DISTWIDTH] IN BLOOD BY AUTOMATED COUNT: 13.7 % (ref 11.5–14.5)
EST. GFR  (NO RACE VARIABLE): 5.8 ML/MIN/1.73 M^2
EST. GFR  (NO RACE VARIABLE): 6.1 ML/MIN/1.73 M^2
GLUCOSE SERPL-MCNC: 66 MG/DL (ref 70–110)
GLUCOSE SERPL-MCNC: 97 MG/DL (ref 70–110)
HCT VFR BLD AUTO: 29.1 % (ref 37–48.5)
HGB BLD-MCNC: 8.4 G/DL (ref 12–16)
IMM GRANULOCYTES # BLD AUTO: 0.02 K/UL (ref 0–0.04)
IMM GRANULOCYTES NFR BLD AUTO: 0.4 % (ref 0–0.5)
INR PPP: 1.2 (ref 0.8–1.2)
LYMPHOCYTES # BLD AUTO: 1 K/UL (ref 1–4.8)
LYMPHOCYTES NFR BLD: 18.1 % (ref 18–48)
MAGNESIUM SERPL-MCNC: 1.9 MG/DL (ref 1.6–2.6)
MAGNESIUM SERPL-MCNC: 2 MG/DL (ref 1.6–2.6)
MCH RBC QN AUTO: 30 PG (ref 27–31)
MCHC RBC AUTO-ENTMCNC: 28.9 G/DL (ref 32–36)
MCV RBC AUTO: 104 FL (ref 82–98)
MONOCYTES # BLD AUTO: 0.9 K/UL (ref 0.3–1)
MONOCYTES NFR BLD: 16.3 % (ref 4–15)
NEUTROPHILS # BLD AUTO: 3.3 K/UL (ref 1.8–7.7)
NEUTROPHILS NFR BLD: 59.3 % (ref 38–73)
NRBC BLD-RTO: 0 /100 WBC
PHOSPHATE SERPL-MCNC: 6.6 MG/DL (ref 2.7–4.5)
PHOSPHATE SERPL-MCNC: 6.8 MG/DL (ref 2.7–4.5)
PLATELET # BLD AUTO: 347 K/UL (ref 150–450)
PMV BLD AUTO: 10.6 FL (ref 9.2–12.9)
POCT GLUCOSE: 113 MG/DL (ref 70–110)
POCT GLUCOSE: 40 MG/DL (ref 70–110)
POCT GLUCOSE: 80 MG/DL (ref 70–110)
POCT GLUCOSE: 87 MG/DL (ref 70–110)
POCT GLUCOSE: 91 MG/DL (ref 70–110)
POTASSIUM SERPL-SCNC: 3.5 MMOL/L (ref 3.5–5.1)
POTASSIUM SERPL-SCNC: 3.7 MMOL/L (ref 3.5–5.1)
PROTHROMBIN TIME: 12.4 SEC (ref 9–12.5)
RBC # BLD AUTO: 2.8 M/UL (ref 4–5.4)
SODIUM SERPL-SCNC: 133 MMOL/L (ref 136–145)
SODIUM SERPL-SCNC: 135 MMOL/L (ref 136–145)
WBC # BLD AUTO: 5.57 K/UL (ref 3.9–12.7)

## 2023-05-21 PROCEDURE — 85610 PROTHROMBIN TIME: CPT | Performed by: STUDENT IN AN ORGANIZED HEALTH CARE EDUCATION/TRAINING PROGRAM

## 2023-05-21 PROCEDURE — 83735 ASSAY OF MAGNESIUM: CPT | Mod: 91 | Performed by: STUDENT IN AN ORGANIZED HEALTH CARE EDUCATION/TRAINING PROGRAM

## 2023-05-21 PROCEDURE — 85730 THROMBOPLASTIN TIME PARTIAL: CPT | Performed by: STUDENT IN AN ORGANIZED HEALTH CARE EDUCATION/TRAINING PROGRAM

## 2023-05-21 PROCEDURE — 25000003 PHARM REV CODE 250: Performed by: STUDENT IN AN ORGANIZED HEALTH CARE EDUCATION/TRAINING PROGRAM

## 2023-05-21 PROCEDURE — 99233 PR SUBSEQUENT HOSPITAL CARE,LEVL III: ICD-10-PCS | Mod: GC,,, | Performed by: STUDENT IN AN ORGANIZED HEALTH CARE EDUCATION/TRAINING PROGRAM

## 2023-05-21 PROCEDURE — 94761 N-INVAS EAR/PLS OXIMETRY MLT: CPT

## 2023-05-21 PROCEDURE — 85025 COMPLETE CBC W/AUTO DIFF WBC: CPT | Performed by: HOSPITALIST

## 2023-05-21 PROCEDURE — 84100 ASSAY OF PHOSPHORUS: CPT | Performed by: STUDENT IN AN ORGANIZED HEALTH CARE EDUCATION/TRAINING PROGRAM

## 2023-05-21 PROCEDURE — 25000003 PHARM REV CODE 250: Performed by: HOSPITALIST

## 2023-05-21 PROCEDURE — 99233 SBSQ HOSP IP/OBS HIGH 50: CPT | Mod: GC,,, | Performed by: STUDENT IN AN ORGANIZED HEALTH CARE EDUCATION/TRAINING PROGRAM

## 2023-05-21 PROCEDURE — 80048 BASIC METABOLIC PNL TOTAL CA: CPT | Mod: 91 | Performed by: STUDENT IN AN ORGANIZED HEALTH CARE EDUCATION/TRAINING PROGRAM

## 2023-05-21 PROCEDURE — 36415 COLL VENOUS BLD VENIPUNCTURE: CPT | Performed by: STUDENT IN AN ORGANIZED HEALTH CARE EDUCATION/TRAINING PROGRAM

## 2023-05-21 PROCEDURE — 63600175 PHARM REV CODE 636 W HCPCS: Performed by: STUDENT IN AN ORGANIZED HEALTH CARE EDUCATION/TRAINING PROGRAM

## 2023-05-21 PROCEDURE — 25000003 PHARM REV CODE 250: Performed by: INTERNAL MEDICINE

## 2023-05-21 PROCEDURE — 20600001 HC STEP DOWN PRIVATE ROOM

## 2023-05-21 PROCEDURE — 25000003 PHARM REV CODE 250

## 2023-05-21 RX ORDER — HEPARIN SODIUM,PORCINE/D5W 25000/250
0-40 INTRAVENOUS SOLUTION INTRAVENOUS CONTINUOUS
Status: DISCONTINUED | OUTPATIENT
Start: 2023-05-21 | End: 2023-05-22

## 2023-05-21 RX ADMIN — FAMOTIDINE 20 MG: 20 TABLET ORAL at 10:05

## 2023-05-21 RX ADMIN — CALCIUM ACETATE 1334 MG: 667 CAPSULE ORAL at 12:05

## 2023-05-21 RX ADMIN — HYDRALAZINE HYDROCHLORIDE 75 MG: 50 TABLET ORAL at 09:05

## 2023-05-21 RX ADMIN — HEPARIN SODIUM 12 UNITS/KG/HR: 10000 INJECTION, SOLUTION INTRAVENOUS at 10:05

## 2023-05-21 RX ADMIN — CARVEDILOL 6.25 MG: 6.25 TABLET, FILM COATED ORAL at 09:05

## 2023-05-21 RX ADMIN — CALCITRIOL CAPSULES 0.25 MCG 0.5 MCG: 0.25 CAPSULE ORAL at 10:05

## 2023-05-21 RX ADMIN — SEVELAMER CARBONATE 800 MG: 800 TABLET, FILM COATED ORAL at 12:05

## 2023-05-21 RX ADMIN — ALLOPURINOL 50 MG: 300 TABLET ORAL at 10:05

## 2023-05-21 RX ADMIN — SEVELAMER CARBONATE 800 MG: 800 TABLET, FILM COATED ORAL at 05:05

## 2023-05-21 RX ADMIN — ISOSORBIDE DINITRATE 40 MG: 20 TABLET ORAL at 10:05

## 2023-05-21 RX ADMIN — ISOSORBIDE DINITRATE 40 MG: 20 TABLET ORAL at 04:05

## 2023-05-21 RX ADMIN — SERTRALINE HYDROCHLORIDE 50 MG: 50 TABLET ORAL at 10:05

## 2023-05-21 RX ADMIN — SEVELAMER CARBONATE 800 MG: 800 TABLET, FILM COATED ORAL at 10:05

## 2023-05-21 RX ADMIN — AMITRIPTYLINE HYDROCHLORIDE 50 MG: 25 TABLET, FILM COATED ORAL at 09:05

## 2023-05-21 RX ADMIN — ISOSORBIDE DINITRATE 40 MG: 20 TABLET ORAL at 09:05

## 2023-05-21 RX ADMIN — ATORVASTATIN CALCIUM 80 MG: 40 TABLET, FILM COATED ORAL at 10:05

## 2023-05-21 RX ADMIN — CARVEDILOL 6.25 MG: 6.25 TABLET, FILM COATED ORAL at 10:05

## 2023-05-21 RX ADMIN — CALCIUM ACETATE 1334 MG: 667 CAPSULE ORAL at 05:05

## 2023-05-21 NOTE — PLAN OF CARE
POC discussed with pt; pt verbalized understanding. Heparin gtt initiated per order; see MAR. Kerns catheter placed per order.    Problem: Adult Inpatient Plan of Care  Goal: Plan of Care Review  Outcome: Ongoing, Progressing  Goal: Patient-Specific Goal (Individualized)  Outcome: Ongoing, Progressing  Goal: Absence of Hospital-Acquired Illness or Injury  Outcome: Ongoing, Progressing  Goal: Optimal Comfort and Wellbeing  Outcome: Ongoing, Progressing  Goal: Readiness for Transition of Care  Outcome: Ongoing, Progressing     Problem: Fluid and Electrolyte Imbalance (Acute Kidney Injury/Impairment)  Goal: Fluid and Electrolyte Balance  Outcome: Ongoing, Progressing     Problem: Oral Intake Inadequate (Acute Kidney Injury/Impairment)  Goal: Optimal Nutrition Intake  Outcome: Ongoing, Progressing     Problem: Renal Function Impairment (Acute Kidney Injury/Impairment)  Goal: Effective Renal Function  Outcome: Ongoing, Progressing     Problem: Skin Injury Risk Increased  Goal: Skin Health and Integrity  Outcome: Ongoing, Progressing     Problem: Impaired Wound Healing  Goal: Optimal Wound Healing  Outcome: Ongoing, Progressing     Problem: Fall Injury Risk  Goal: Absence of Fall and Fall-Related Injury  Outcome: Ongoing, Progressing     Problem: Infection  Goal: Absence of Infection Signs and Symptoms  Outcome: Ongoing, Progressing

## 2023-05-21 NOTE — ASSESSMENT & PLAN NOTE
History of CAD with retrosternal chest pain. EKG negative to ST changes. Nonspecific T wave inversions.     -Cardiac tele  -EKG prn  -Bidil per Cards recs  -Hold anticoagulation for line placement; continue heparin

## 2023-05-21 NOTE — PROGRESS NOTES
Andrew Andrade - Cardiology [facility] Medicine  Progress Note    Patient Name: Suyapa Connelly  MRN: [id number]  Patient Class: IP- Inpatient   Admission Date: 5/1/2023  Length of Stay: 18 days  Attending Physician: Batsheva Torres DO  Primary Care Provider: Magen Christensen MD        Subjective:     Principal Problem:Acute on chronic combined systolic and diastolic heart failure        HPI:  57 y.o. female with past medical history of CAD s/p CABG, CKD, DM, HTN, HLD, s/p bilateral AKA who presnets with chest pain, difficulty breathing, leg swelling.     Per ED She has been unable to access any of her medications for the last 3 months. Over the last few months she has noted worsening shortness of breath, particulatly orthopnea, and leg swelling. In the last two weeks she endorses associated abdominal pain and swelling, reduced appetite, and pleuritic chest pain with deep inspiration. She endorses occasional lighthededness with changes in position. Last week she had an episode where she was unable to breathe for about two minutes which was very upsetting for her and her , but she recovered and did not seek care at that time. Today, she complains of significant difficulty breathing, swelling in her legs and abdomen, and reduced appetite.     No headaches, visual changes, URI symptoms, palpitations, nausea, vomiting, diarrhea, blood in her stool, dysuria, hematuria, numbness or weakness in her extremities. She does endorse occasional paresthesias associated with her chronic phantom limb pain. She additionally has had increased difficulties with depression recently, denies suicidal or homicidal ideation, but has had considerable stress and low mood.      Of note, the patient saw a new primary care provider 4/21 for similar concerns who recommended she proceed to the ED at that time, but they were unable due to financial and geographic concerns. She lives two hours away from [facility] and her  cares for her and  elderly mother with dementia. She has had decreased access to medical care over the last year, and unable to access most of her prescriptions. She had a 90 day supply of four medications (lasix, statin, clopidogrel, sertraline) but these were accidentally lost and she has been unable to afford a replacement. They present today for evaluation and to reestablish care and access to her prescriptions.     Labs on admission significant for an elevated BNP, hypocalcemia which corrected is 8.3, significant elevation in Creatinine and a slight elevation of troponin    CXR  Cardiomegaly with pulmonary interstitial edema, suggestive of pulmonary edema secondary to CHF.      Overview/Hospital Course:  Diuresis initiated with Lasix IV.  1.2L UOP in the past 24 hours.  Vitals reassuring with adequate sats on ambient air.  Still very edematious; Cardiology recs lasix gtt.  GDMT initiated. Pt given 2mg morphine for moderate to severe pain in legs from swelling. Diuresis increased to 40mg/hr due to inadequate urine output. Cr continued to increase. Cr stable at new Lasix rate. Diuresed 1.9L with some improvement in clinical status. S/p RHC that showed wedge of 26-32 with CVP of 15. Will continue diuresis with Lasix with the addition of metolazone and acetazolamide. Discussions had with nephrology about electrolyte abnormalities. GOC discussions underway.       Interval History: Pt hypoglycemic this morning. Given 2 juices and encouraged to eat breakfast. Repeat back at 88. Mentation improved slightly from yesterday.     Review of Systems   Constitutional:  Negative for chills, fatigue and fever.   HENT:  Negative for congestion, dental problem, facial swelling, postnasal drip, rhinorrhea, sore throat and trouble swallowing.    Eyes:  Negative for photophobia and pain.   Respiratory:  Negative for cough, choking, chest tightness, shortness of breath and wheezing.    Cardiovascular:  Positive for leg swelling. Negative for chest  pain.   Gastrointestinal:  Positive for abdominal pain. Negative for abdominal distention, diarrhea, nausea and vomiting.   Endocrine: Negative.    Genitourinary:  Negative for dysuria, flank pain, frequency and hematuria.   Musculoskeletal:  Positive for myalgias. Negative for back pain.   Skin:  Negative for pallor and rash.   Allergic/Immunologic: Negative.    Neurological:  Negative for dizziness, syncope, weakness, numbness and headaches.   Psychiatric/Behavioral:  The patient is not nervous/anxious.    Objective:     Vital Signs (Most Recent):  Temp: 97.5 °F (36.4 °C) (05/21/23 0807)  Pulse: 82 (05/21/23 1003)  Resp: 16 (05/21/23 0807)  BP: (!) 115/59 (05/21/23 1003)  SpO2: 95 % (05/21/23 0807) Vital Signs (24h Range):  Temp:  [97.5 °F (36.4 °C)-98.2 °F (36.8 °C)] 97.5 °F (36.4 °C)  Pulse:  [75-87] 82  Resp:  [16-21] 16  SpO2:  [95 %-98 %] 95 %  BP: (105-116)/(49-70) 115/59     Weight:  (bed scale not working)  Body mass index is 35.88 kg/m².    Intake/Output Summary (Last 24 hours) at 5/21/2023 1021  Last data filed at 5/21/2023 0834  Gross per 24 hour   Intake 870 ml   Output --   Net 870 ml         Physical Exam  Vitals and nursing note reviewed.   Constitutional:       General: She is not in acute distress.     Appearance: Normal appearance. She is obese. She is not ill-appearing or diaphoretic.   HENT:      Head: Normocephalic and atraumatic.      Right Ear: External ear normal.      Left Ear: External ear normal.      Nose: Nose normal.      Mouth/Throat:      Mouth: Mucous membranes are moist.      Pharynx: Oropharynx is clear.   Eyes:      General: No scleral icterus.     Extraocular Movements: Extraocular movements intact.      Conjunctiva/sclera: Conjunctivae normal.      Pupils: Pupils are equal, round, and reactive to light.   Cardiovascular:      Rate and Rhythm: Normal rate and regular rhythm.      Pulses: Normal pulses.      Heart sounds: Normal heart sounds. No murmur heard.  Pulmonary:       Effort: Pulmonary effort is normal. No respiratory distress.      Breath sounds: Normal breath sounds. No stridor. No wheezing or rhonchi.      Comments: On NC  Abdominal:      General: Abdomen is flat. There is no distension.      Palpations: Abdomen is soft.      Tenderness: There is no abdominal tenderness. There is no guarding.   Musculoskeletal:         General: Swelling present. No tenderness. Normal range of motion.      Cervical back: Normal range of motion and neck supple. No rigidity or tenderness.      Right lower leg: Edema present.      Left lower leg: Edema present.      Comments: Trigger finger R middle finger  BL AKA stump edema +2  L thigh tenderness with palpation    Skin:     General: Skin is warm and dry.      Capillary Refill: Capillary refill takes less than 2 seconds.      Coloration: Skin is not jaundiced.      Findings: No erythema.   Neurological:      Mental Status: She is oriented to person, place, and time. She is lethargic.   Psychiatric:         Mood and Affect: Mood normal.         Behavior: Behavior normal.           Significant Labs: All pertinent labs within the past 24 hours have been reviewed.    Significant Imaging: I have reviewed all pertinent imaging results/findings within the past 24 hours.      Assessment/Plan:      * Acute on chronic combined systolic and diastolic heart failure  Last Echo 6/21 showing     The estimated ejection fraction is 55%.   The left ventricle is normal in size with normal systolic function.   Normal left ventricular diastolic function.   Normal right ventricular size with normal right ventricular systolic function.   Mild tricuspid regurgitation.   The estimated PA systolic pressure is 31 mmHg.   Normal central venous pressure (3 mmHg).    New Echo   The left ventricle is mildly enlarged with severely decreased systolic function. The estimated ejection fraction is 15%.   There is severe left ventricular global hypokinesis.   Normal right  ventricular size with low normal right ventricular systolic function.   Grade I left ventricular diastolic dysfunction.   Biatrial enlargement.   Mild-to-moderate mitral regurgitation.   Severe tricuspid regurgitation.   The estimated PA systolic pressure is 43 mmHg.   Elevated central venous pressure (15 mmHg).           RHC  CVP 15  Wedge 25-32    Patient did not tolerate dobutamine, had chest pain. Dobutamine dc'd  Per cardiology the patients significant peripheral vascular disease prohibits advanced options    -Holding diuretics per nephrology  -Lasix converted to oral torsemide  -Metolazone 10 daily oral  -Acetazolamide 500 BID   -BMPs BID  -Consult Interventional nephrology for tunneled line placement  -Antihypertensive. GDMT, Anticoagulation, and diuresis management per Nephrology and Cardiology in a multidisciplinary team  -Qq4 BMPs with electrolyte repletion as needed; goal K>4, Mg>2  - 1.5L fluid restriction, low Na diet  - continue GDMT when possible  -Tylenol 1g up to 3 times a day            Congestive heart failure  See primary problem    Echo    Interpretation Summary  · The left ventricle is mildly enlarged with severely decreased systolic function. The estimated ejection fraction is 15%.  · There is severe left ventricular global hypokinesis.  · Normal right ventricular size with low normal right ventricular systolic function.  · Grade I left ventricular diastolic dysfunction.  · Biatrial enlargement.  · Mild-to-moderate mitral regurgitation.  · Severe tricuspid regurgitation.  · The estimated PA systolic pressure is 43 mmHg.  · Elevated central venous pressure (15 mmHg).      Myalgia  Will continue to treat pain as needed      ROSLYN (acute kidney injury)  Pt with increased Cr to 6.7 from 2.0 baseline. Possibly due to renovascular congestion from volume overload.     -Kerns  -Strict Is/Os  -Avoid nephrotoxic agents  -Daily CMPs        Acute hypoxemic respiratory failure  Resolved    CKD (chronic  kidney disease), stage IV  History of CKD. See ROSLYN.      Above-knee amputation of right lower extremity  Prior history consistent with exam      Uses self-applied continuous glucose monitoring device  POCT glucose and daily CMPs    -Restart insulin for hyperglycemia      Dermatitis associated with moisture  Large body habitus with dermatitis to folds. Will monitor for infection.    -Zinc based powder  -Topical abx if indicated      Status post above knee amputation, left  History of.      Hypoalbuminemia  Pt with poor PO intake reported. Pt eats one meal a day and has associated nausea and vomiting. Will try antiemetics before feeds to improve intake. Will monitor daily intake and calorie replacement.     -Calorie count  -Diabetic/Renal diet  -Antiemetics before meals; Zofran 4 IV PRN  -Monitor Qtc for prolongation with antiemetics      Uremia  Elevating BUN. Worsening insomnia. Will discuss goals and plan with nephrology.       Impaired functional mobility and endurance  PT/OT for bedbound patient      Paroxysmal atrial fibrillation  Continue home rate control  NSR on EKG  Eliquis 5mg BID-Hold for line placement          Anemia  Asymptomatic. Higher than 8 months ago. No signs of acute bleed at this time. Haptoglobin elevated.    -CBC daily  -Transfuse if <7        S/P CABG x 1  History of. Sternal scar well healed.       CAD (coronary artery disease)  History of CAD with retrosternal chest pain. EKG negative to ST changes. Nonspecific T wave inversions.     -Cardiac tele  -EKG prn  -Bidil per Cards recs  -Hold anticoagulation for line placement; continue heparin    CAROLIN (generalized anxiety disorder)  Continue home anxiety medications      Essential hypertension  Continue home antihypertensives      Hypocalcemia  Ionized calcium at 0.76. Ca correcting at 8.4.     -Calcium acetate for calcium repletion and for phos binding.       PAD (peripheral artery disease)  History of bilateral AKA.           VTE Risk  Mitigation (From admission, onward)         Ordered     heparin 25,000 units in dextrose 5% (100 units/ml) IV bolus from bag - ADDITIONAL PRN BOLUS - 60 units/kg  As needed (PRN)        Question:  Heparin Infusion Adjustment (DO NOT MODIFY ANSWER)  Answer:  [email or web address]\epic\Images\Pharmacy\HeparinInfusions\heparin LOW INTENSITY nomogram for [facility] [id number]    05/21/23 0737     heparin 25,000 units in dextrose 5% (100 units/ml) IV bolus from bag - ADDITIONAL PRN BOLUS - 30 units/kg  As needed (PRN)        Question:  Heparin Infusion Adjustment (DO NOT MODIFY ANSWER)  Answer:  [email or web address]\epic\Images\Pharmacy\HeparinInfusions\heparin LOW INTENSITY nomogram for [facility] [id number].pdf    05/21/23 0737     heparin 25,000 units in dextrose 5% 250 mL (100 units/mL) infusion LOW INTENSITY nomogram - [facility]  Continuous        Question Answer Comment   Heparin Infusion Adjustment (DO NOT MODIFY ANSWER) [email or web address]\epic\Images\Pharmacy\HeparinInfusions\heparin LOW INTENSITY nomogram for [facility] [id number]    Begin at (in units/kg/hr) 12        05/21/23 0737     IP VTE HIGH RISK PATIENT  Once         05/07/23 1547                Discharge Planning   DEVAN: 5/23/2023     Code Status: Full Code   Is the patient medically ready for discharge?: No    Reason for patient still in hospital (select all that apply): Patient trending condition, Treatment and Consult recommendations  Discharge Plan A: Home Health   Discharge Delays: None known at this time        Onur Bueno MD  Department of Hospital Medicine   [facility] - Cardiology Stepdown

## 2023-05-21 NOTE — NURSING
0820 Pt BG 40. Notified RIANNA Bueno MD. Orders to have pt drink a couple of apple juices and recheck in an hour.    0945 BG recheck 80.     1630 RN notified that lab attempted to draw aptt 3x with no success. States she will try again later.     1855 aptt still not drawn; stat order placed. PM nurse notified of pending aptt

## 2023-05-21 NOTE — SUBJECTIVE & OBJECTIVE
Interval History: Pt hypoglycemic this morning. Given 2 juices and encouraged to eat breakfast. Repeat back at 88. Mentation improved slightly from yesterday.     Review of Systems   Constitutional:  Negative for chills, fatigue and fever.   HENT:  Negative for congestion, dental problem, facial swelling, postnasal drip, rhinorrhea, sore throat and trouble swallowing.    Eyes:  Negative for photophobia and pain.   Respiratory:  Negative for cough, choking, chest tightness, shortness of breath and wheezing.    Cardiovascular:  Positive for leg swelling. Negative for chest pain.   Gastrointestinal:  Positive for abdominal pain. Negative for abdominal distention, diarrhea, nausea and vomiting.   Endocrine: Negative.    Genitourinary:  Negative for dysuria, flank pain, frequency and hematuria.   Musculoskeletal:  Positive for myalgias. Negative for back pain.   Skin:  Negative for pallor and rash.   Allergic/Immunologic: Negative.    Neurological:  Negative for dizziness, syncope, weakness, numbness and headaches.   Psychiatric/Behavioral:  The patient is not nervous/anxious.    Objective:     Vital Signs (Most Recent):  Temp: 97.5 °F (36.4 °C) (05/21/23 0807)  Pulse: 82 (05/21/23 1003)  Resp: 16 (05/21/23 0807)  BP: (!) 115/59 (05/21/23 1003)  SpO2: 95 % (05/21/23 0807) Vital Signs (24h Range):  Temp:  [97.5 °F (36.4 °C)-98.2 °F (36.8 °C)] 97.5 °F (36.4 °C)  Pulse:  [75-87] 82  Resp:  [16-21] 16  SpO2:  [95 %-98 %] 95 %  BP: (105-116)/(49-70) 115/59     Weight:  (bed scale not working)  Body mass index is 35.88 kg/m².    Intake/Output Summary (Last 24 hours) at 5/21/2023 1021  Last data filed at 5/21/2023 0834  Gross per 24 hour   Intake 870 ml   Output --   Net 870 ml         Physical Exam  Vitals and nursing note reviewed.   Constitutional:       General: She is not in acute distress.     Appearance: Normal appearance. She is obese. She is not ill-appearing or diaphoretic.   HENT:      Head: Normocephalic and  atraumatic.      Right Ear: External ear normal.      Left Ear: External ear normal.      Nose: Nose normal.      Mouth/Throat:      Mouth: Mucous membranes are moist.      Pharynx: Oropharynx is clear.   Eyes:      General: No scleral icterus.     Extraocular Movements: Extraocular movements intact.      Conjunctiva/sclera: Conjunctivae normal.      Pupils: Pupils are equal, round, and reactive to light.   Cardiovascular:      Rate and Rhythm: Normal rate and regular rhythm.      Pulses: Normal pulses.      Heart sounds: Normal heart sounds. No murmur heard.  Pulmonary:      Effort: Pulmonary effort is normal. No respiratory distress.      Breath sounds: Normal breath sounds. No stridor. No wheezing or rhonchi.      Comments: On NC  Abdominal:      General: Abdomen is flat. There is no distension.      Palpations: Abdomen is soft.      Tenderness: There is no abdominal tenderness. There is no guarding.   Musculoskeletal:         General: Swelling present. No tenderness. Normal range of motion.      Cervical back: Normal range of motion and neck supple. No rigidity or tenderness.      Right lower leg: Edema present.      Left lower leg: Edema present.      Comments: Trigger finger R middle finger  BL AKA stump edema +2  L thigh tenderness with palpation    Skin:     General: Skin is warm and dry.      Capillary Refill: Capillary refill takes less than 2 seconds.      Coloration: Skin is not jaundiced.      Findings: No erythema.   Neurological:      Mental Status: She is oriented to person, place, and time. She is lethargic.   Psychiatric:         Mood and Affect: Mood normal.         Behavior: Behavior normal.           Significant Labs: All pertinent labs within the past 24 hours have been reviewed.    Significant Imaging: I have reviewed all pertinent imaging results/findings within the past 24 hours.

## 2023-05-21 NOTE — ASSESSMENT & PLAN NOTE
See primary problem    Echo    Interpretation Summary  · The left ventricle is mildly enlarged with severely decreased systolic function. The estimated ejection fraction is 15%.  · There is severe left ventricular global hypokinesis.  · Normal right ventricular size with low normal right ventricular systolic function.  · Grade I left ventricular diastolic dysfunction.  · Biatrial enlargement.  · Mild-to-moderate mitral regurgitation.  · Severe tricuspid regurgitation.  · The estimated PA systolic pressure is 43 mmHg.  · Elevated central venous pressure (15 mmHg).

## 2023-05-21 NOTE — ASSESSMENT & PLAN NOTE
Last Echo 6/21 showing     The estimated ejection fraction is 55%.   The left ventricle is normal in size with normal systolic function.   Normal left ventricular diastolic function.   Normal right ventricular size with normal right ventricular systolic function.   Mild tricuspid regurgitation.   The estimated PA systolic pressure is 31 mmHg.   Normal central venous pressure (3 mmHg).    New Echo   The left ventricle is mildly enlarged with severely decreased systolic function. The estimated ejection fraction is 15%.   There is severe left ventricular global hypokinesis.   Normal right ventricular size with low normal right ventricular systolic function.   Grade I left ventricular diastolic dysfunction.   Biatrial enlargement.   Mild-to-moderate mitral regurgitation.   Severe tricuspid regurgitation.   The estimated PA systolic pressure is 43 mmHg.   Elevated central venous pressure (15 mmHg).           RHC  CVP 15  Wedge 25-32    Patient did not tolerate dobutamine, had chest pain. Dobutamine dc'd  Per cardiology the patients significant peripheral vascular disease prohibits advanced options    -Holding diuretics per nephrology  -Lasix converted to oral torsemide  -Metolazone 10 daily oral  -Acetazolamide 500 BID   -BMPs BID  -Consult Interventional nephrology for tunneled line placement  -Antihypertensive. GDMT, Anticoagulation, and diuresis management per Nephrology and Cardiology in a multidisciplinary team  -Qq4 BMPs with electrolyte repletion as needed; goal K>4, Mg>2  - 1.5L fluid restriction, low Na diet  - continue GDMT when possible  -Tylenol 1g up to 3 times a day

## 2023-05-22 LAB
ANION GAP SERPL CALC-SCNC: 13 MMOL/L (ref 8–16)
ANION GAP SERPL CALC-SCNC: 14 MMOL/L (ref 8–16)
APTT PPP: 40.8 SEC (ref 21–32)
APTT PPP: 43 SEC (ref 21–32)
APTT PPP: 47.1 SEC (ref 21–32)
BASOPHILS # BLD AUTO: 0.07 K/UL (ref 0–0.2)
BASOPHILS NFR BLD: 1 % (ref 0–1.9)
BUN SERPL-MCNC: 89 MG/DL (ref 6–20)
BUN SERPL-MCNC: 92 MG/DL (ref 6–20)
CALCIUM SERPL-MCNC: 8.2 MG/DL (ref 8.7–10.5)
CALCIUM SERPL-MCNC: 8.4 MG/DL (ref 8.7–10.5)
CHLORIDE SERPL-SCNC: 92 MMOL/L (ref 95–110)
CHLORIDE SERPL-SCNC: 92 MMOL/L (ref 95–110)
CO2 SERPL-SCNC: 25 MMOL/L (ref 23–29)
CO2 SERPL-SCNC: 27 MMOL/L (ref 23–29)
CREAT SERPL-MCNC: 7.3 MG/DL (ref 0.5–1.4)
CREAT SERPL-MCNC: 7.4 MG/DL (ref 0.5–1.4)
DIFFERENTIAL METHOD: ABNORMAL
EOSINOPHIL # BLD AUTO: 0.3 K/UL (ref 0–0.5)
EOSINOPHIL NFR BLD: 3.6 % (ref 0–8)
ERYTHROCYTE [DISTWIDTH] IN BLOOD BY AUTOMATED COUNT: 13.7 % (ref 11.5–14.5)
EST. GFR  (NO RACE VARIABLE): 6 ML/MIN/1.73 M^2
EST. GFR  (NO RACE VARIABLE): 6.1 ML/MIN/1.73 M^2
GLUCOSE SERPL-MCNC: 107 MG/DL (ref 70–110)
GLUCOSE SERPL-MCNC: 120 MG/DL (ref 70–110)
HCT VFR BLD AUTO: 28.1 % (ref 37–48.5)
HGB BLD-MCNC: 8.4 G/DL (ref 12–16)
IMM GRANULOCYTES # BLD AUTO: 0.02 K/UL (ref 0–0.04)
IMM GRANULOCYTES NFR BLD AUTO: 0.3 % (ref 0–0.5)
LYMPHOCYTES # BLD AUTO: 0.9 K/UL (ref 1–4.8)
LYMPHOCYTES NFR BLD: 12.5 % (ref 18–48)
MAGNESIUM SERPL-MCNC: 1.9 MG/DL (ref 1.6–2.6)
MAGNESIUM SERPL-MCNC: 1.9 MG/DL (ref 1.6–2.6)
MCH RBC QN AUTO: 30.9 PG (ref 27–31)
MCHC RBC AUTO-ENTMCNC: 29.9 G/DL (ref 32–36)
MCV RBC AUTO: 103 FL (ref 82–98)
MONOCYTES # BLD AUTO: 1.1 K/UL (ref 0.3–1)
MONOCYTES NFR BLD: 15.4 % (ref 4–15)
NEUTROPHILS # BLD AUTO: 4.7 K/UL (ref 1.8–7.7)
NEUTROPHILS NFR BLD: 67.2 % (ref 38–73)
NRBC BLD-RTO: 0 /100 WBC
PHOSPHATE SERPL-MCNC: 6.4 MG/DL (ref 2.7–4.5)
PHOSPHATE SERPL-MCNC: 6.6 MG/DL (ref 2.7–4.5)
PLATELET # BLD AUTO: 332 K/UL (ref 150–450)
PMV BLD AUTO: 10.7 FL (ref 9.2–12.9)
POCT GLUCOSE: 111 MG/DL (ref 70–110)
POCT GLUCOSE: 111 MG/DL (ref 70–110)
POCT GLUCOSE: 113 MG/DL (ref 70–110)
POCT GLUCOSE: 98 MG/DL (ref 70–110)
POTASSIUM SERPL-SCNC: 3.5 MMOL/L (ref 3.5–5.1)
POTASSIUM SERPL-SCNC: 4 MMOL/L (ref 3.5–5.1)
RBC # BLD AUTO: 2.72 M/UL (ref 4–5.4)
SODIUM SERPL-SCNC: 131 MMOL/L (ref 136–145)
SODIUM SERPL-SCNC: 132 MMOL/L (ref 136–145)
WBC # BLD AUTO: 7.03 K/UL (ref 3.9–12.7)

## 2023-05-22 PROCEDURE — 25000003 PHARM REV CODE 250

## 2023-05-22 PROCEDURE — 94799 UNLISTED PULMONARY SVC/PX: CPT

## 2023-05-22 PROCEDURE — 99233 PR SUBSEQUENT HOSPITAL CARE,LEVL III: ICD-10-PCS | Mod: ,,, | Performed by: INTERNAL MEDICINE

## 2023-05-22 PROCEDURE — 25000003 PHARM REV CODE 250: Performed by: STUDENT IN AN ORGANIZED HEALTH CARE EDUCATION/TRAINING PROGRAM

## 2023-05-22 PROCEDURE — 85025 COMPLETE CBC W/AUTO DIFF WBC: CPT | Performed by: HOSPITALIST

## 2023-05-22 PROCEDURE — 36415 COLL VENOUS BLD VENIPUNCTURE: CPT | Performed by: STUDENT IN AN ORGANIZED HEALTH CARE EDUCATION/TRAINING PROGRAM

## 2023-05-22 PROCEDURE — 80048 BASIC METABOLIC PNL TOTAL CA: CPT | Mod: 91 | Performed by: STUDENT IN AN ORGANIZED HEALTH CARE EDUCATION/TRAINING PROGRAM

## 2023-05-22 PROCEDURE — 99233 SBSQ HOSP IP/OBS HIGH 50: CPT | Mod: GC,,, | Performed by: STUDENT IN AN ORGANIZED HEALTH CARE EDUCATION/TRAINING PROGRAM

## 2023-05-22 PROCEDURE — 94761 N-INVAS EAR/PLS OXIMETRY MLT: CPT

## 2023-05-22 PROCEDURE — 97530 THERAPEUTIC ACTIVITIES: CPT | Mod: CQ

## 2023-05-22 PROCEDURE — 99233 SBSQ HOSP IP/OBS HIGH 50: CPT | Mod: ,,, | Performed by: INTERNAL MEDICINE

## 2023-05-22 PROCEDURE — 94760 N-INVAS EAR/PLS OXIMETRY 1: CPT

## 2023-05-22 PROCEDURE — 99233 PR SUBSEQUENT HOSPITAL CARE,LEVL III: ICD-10-PCS | Mod: GC,,, | Performed by: STUDENT IN AN ORGANIZED HEALTH CARE EDUCATION/TRAINING PROGRAM

## 2023-05-22 PROCEDURE — 25000003 PHARM REV CODE 250: Performed by: INTERNAL MEDICINE

## 2023-05-22 PROCEDURE — 25000003 PHARM REV CODE 250: Performed by: HOSPITALIST

## 2023-05-22 PROCEDURE — 85730 THROMBOPLASTIN TIME PARTIAL: CPT | Mod: 91 | Performed by: STUDENT IN AN ORGANIZED HEALTH CARE EDUCATION/TRAINING PROGRAM

## 2023-05-22 PROCEDURE — 83735 ASSAY OF MAGNESIUM: CPT | Mod: 91 | Performed by: STUDENT IN AN ORGANIZED HEALTH CARE EDUCATION/TRAINING PROGRAM

## 2023-05-22 PROCEDURE — 63600175 PHARM REV CODE 636 W HCPCS: Performed by: STUDENT IN AN ORGANIZED HEALTH CARE EDUCATION/TRAINING PROGRAM

## 2023-05-22 PROCEDURE — 97110 THERAPEUTIC EXERCISES: CPT | Mod: CQ

## 2023-05-22 PROCEDURE — 84100 ASSAY OF PHOSPHORUS: CPT | Mod: 91 | Performed by: STUDENT IN AN ORGANIZED HEALTH CARE EDUCATION/TRAINING PROGRAM

## 2023-05-22 PROCEDURE — 97530 THERAPEUTIC ACTIVITIES: CPT

## 2023-05-22 PROCEDURE — 20600001 HC STEP DOWN PRIVATE ROOM

## 2023-05-22 RX ORDER — SODIUM CHLORIDE 9 MG/ML
INJECTION, SOLUTION INTRAVENOUS CONTINUOUS
Status: CANCELLED | OUTPATIENT
Start: 2023-05-22 | End: 2023-05-22

## 2023-05-22 RX ORDER — POTASSIUM CHLORIDE 20 MEQ/1
40 TABLET, EXTENDED RELEASE ORAL ONCE
Status: COMPLETED | OUTPATIENT
Start: 2023-05-22 | End: 2023-05-22

## 2023-05-22 RX ORDER — SODIUM CHLORIDE 0.9 % (FLUSH) 0.9 %
10 SYRINGE (ML) INJECTION
Status: DISCONTINUED | OUTPATIENT
Start: 2023-05-22 | End: 2023-05-29 | Stop reason: HOSPADM

## 2023-05-22 RX ORDER — CLOPIDOGREL BISULFATE 75 MG/1
75 TABLET ORAL DAILY
Status: DISCONTINUED | OUTPATIENT
Start: 2023-05-23 | End: 2023-05-23

## 2023-05-22 RX ADMIN — HYDRALAZINE HYDROCHLORIDE 75 MG: 50 TABLET ORAL at 10:05

## 2023-05-22 RX ADMIN — HYDRALAZINE HYDROCHLORIDE 75 MG: 50 TABLET ORAL at 02:05

## 2023-05-22 RX ADMIN — APIXABAN 5 MG: 5 TABLET, FILM COATED ORAL at 09:05

## 2023-05-22 RX ADMIN — CALCIUM ACETATE 1334 MG: 667 CAPSULE ORAL at 09:05

## 2023-05-22 RX ADMIN — HYDROMORPHONE HYDROCHLORIDE 2 MG: 2 TABLET ORAL at 02:05

## 2023-05-22 RX ADMIN — CARVEDILOL 6.25 MG: 6.25 TABLET, FILM COATED ORAL at 09:05

## 2023-05-22 RX ADMIN — POTASSIUM CHLORIDE 40 MEQ: 1500 TABLET, EXTENDED RELEASE ORAL at 09:05

## 2023-05-22 RX ADMIN — ISOSORBIDE DINITRATE 40 MG: 20 TABLET ORAL at 09:05

## 2023-05-22 RX ADMIN — SERTRALINE HYDROCHLORIDE 50 MG: 50 TABLET ORAL at 09:05

## 2023-05-22 RX ADMIN — SEVELAMER CARBONATE 800 MG: 800 TABLET, FILM COATED ORAL at 09:05

## 2023-05-22 RX ADMIN — ATORVASTATIN CALCIUM 80 MG: 40 TABLET, FILM COATED ORAL at 09:05

## 2023-05-22 RX ADMIN — FAMOTIDINE 20 MG: 20 TABLET ORAL at 09:05

## 2023-05-22 RX ADMIN — SEVELAMER CARBONATE 800 MG: 800 TABLET, FILM COATED ORAL at 04:05

## 2023-05-22 RX ADMIN — CALCITRIOL CAPSULES 0.25 MCG 0.5 MCG: 0.25 CAPSULE ORAL at 09:05

## 2023-05-22 RX ADMIN — HEPARIN SODIUM 9 UNITS/KG/HR: 10000 INJECTION, SOLUTION INTRAVENOUS at 04:05

## 2023-05-22 RX ADMIN — HYDRALAZINE HYDROCHLORIDE 75 MG: 50 TABLET ORAL at 09:05

## 2023-05-22 RX ADMIN — AMITRIPTYLINE HYDROCHLORIDE 50 MG: 25 TABLET, FILM COATED ORAL at 09:05

## 2023-05-22 RX ADMIN — ISOSORBIDE DINITRATE 40 MG: 20 TABLET ORAL at 02:05

## 2023-05-22 RX ADMIN — INSULIN DETEMIR 8 UNITS: 100 INJECTION, SOLUTION SUBCUTANEOUS at 09:05

## 2023-05-22 RX ADMIN — CALCIUM ACETATE 1334 MG: 667 CAPSULE ORAL at 04:05

## 2023-05-22 NOTE — PLAN OF CARE
Problem: Occupational Therapy  Goal: Occupational Therapy Goal  Description: Goals to be met by: 5/25     Patient will increase functional independence with ADLs by performing:    UE Dressing with Modified Bradley.  LE Dressing with Modified Bradley.  Sitting at edge of bed x10 minutes with Stand-by Assistance. - Met  Rolling to Bilateral with Stand-by Assistance.   Supine to sit with Bradley.    5/22/2023 1630 by Matilda Slade OT  Outcome: Ongoing, Progressing    OT goals still appropriate. Continue POC

## 2023-05-22 NOTE — ASSESSMENT & PLAN NOTE
Elevating BUN. Worsening insomnia.   Will discuss goals and plan with nephrology.   5/22 encephalopathic

## 2023-05-22 NOTE — ASSESSMENT & PLAN NOTE
ROSLYN on CKD, likely ischemic, CRS and diuresis  Baseline serum creatinine in the 2s, up to 3.9 on admission; up to 7.4 on her most recent labs, peaked around 7.6 over the weekend   Retroperitoneal US with no hydronephrosis    ECHO with severe systolic dysfunction, EF of 15%    RHC with PCWP of 30   UOP of 1.4L over the past 24h while off diuretic; will continue holding diuretics for now   Consider repeating RHC during this admission to assess response to diuresis   No emergent indication for dialysis at this time but pt remains at high risk of requiring hemodialysis in the near future if persistent deterioration of her renal function    Dose medications to GFR    Avoid nephrotoxic agents as much as possible

## 2023-05-22 NOTE — CONSULTS
Chief Complaint   Patient presents with    Chest Pain     Hx cabg, max amputee      HPI:  I have seen Ms. Dale for insertion of cuffed tunneled HD catheter. She is a case of CKD IV presented with ROSLYN. She also has CAD with CABG, Atrial fibrillation, and above knee amputation with PVD. I have explained the procedure to the patient with all the complication including bleeding and infection.  She agreed. I have spoken to her  and got a verbal consent after explaining the procedure and the complications. The verbal consent was witnessed. Please keep NPO after mid night, hold all anticoagulation tonight and send PT and PTT at 4:00 AM in AM.       Past Medical History:   Diagnosis Date    Anxiety     Chronic pain syndrome     CKD (chronic kidney disease), stage III     Depression     Diabetes mellitus     type 2    Diabetes mellitus, type 2     GERD (gastroesophageal reflux disease)     Hyperemesis 3/23/2021    Hyperlipemia     Hypertension     Hypokalemia 3/23/2021    Infection of below knee amputation stump 3/12/2022    Myocardial infarction 2010    minor-caused by stress per pt.    Osteomyelitis     Osteomyelitis of left foot 4/30/2021    PVD (peripheral vascular disease)     Ulcer of left foot     Vaginal delivery     x1       Past Surgical History:   Procedure Laterality Date    ABOVE-KNEE AMPUTATION Left 5/18/2021    Procedure: AMPUTATION, ABOVE KNEE;  Surgeon: Teddy Huber MD;  Location: [facility] OR [address] [place] [address];  Service: Vascular;  Laterality: Left;    ABOVE-KNEE AMPUTATION Right 3/18/2022    Procedure: AMPUTATION, ABOVE KNEE;  Surgeon: DAYNE Florez II, MD;  Location: [facility] OR [address] [place] [address];  Service: Vascular;  Laterality: Right;    Angiogram - Right Extremity Right 7/9/15    angiogram-left leg  10/6/15    ANGIOGRAPHY OF LOWER EXTREMITY Left 4/29/2021    Procedure: ANGIOGRAM, LOWER EXTREMITY;  Surgeon: Teddy Huber MD;  Location: [facility] OR [address] [place] [address];  Service: Vascular;  Laterality: Left;    BELOW KNEE  AMPUTATION OF LOWER EXTREMITY Right 12/28/2021    Procedure: AMPUTATION, BELOW KNEE;  Surgeon: Kaitlyn Rojas MD;  Location: [facility] OR;  Service: General;  Laterality: Right;    CATHETERIZATION OF BOTH LEFT AND RIGHT HEART N/A 12/18/2019    Procedure: CATHETERIZATION, HEART, BOTH LEFT AND RIGHT;  Surgeon: Que Fernando III, MD;  Location: [facility] CATH LAB;  Service: Cardiology;  Laterality: N/A;    CORONARY ANGIOGRAPHY N/A 12/18/2019    Procedure: ANGIOGRAM, CORONARY ARTERY;  Surgeon: Que Fernando III, MD;  Location: [facility] CATH LAB;  Service: Cardiology;  Laterality: N/A;    CORONARY ANGIOGRAPHY INCLUDING BYPASS GRAFTS WITH CATHETERIZATION OF LEFT HEART N/A 7/28/2020    Procedure: ANGIOGRAM, CORONARY, INCLUDING BYPASS GRAFT, WITH LEFT HEART CATHETERIZATION, 9 am;  Surgeon: Rachel Easley MD;  Location: [facility] CATH LAB;  Service: Cardiology;  Laterality: N/A;    CORONARY ARTERY BYPASS GRAFT (CABG) N/A 1/14/2020    Procedure: CORONARY ARTERY BYPASS GRAFT (CABG) x 1     Off Pump;  Surgeon: Huang Altamirano MD;  Location: [facility] OR [address] [place] [address];  Service: Cardiovascular;  Laterality: N/A;    CREATION OF FEMORAL-TIBIAL ARTERY BYPASS Left 4/29/2021    Procedure: CREATION, BYPASS, ARTERIAL, FEMORAL TO ANTERIOR TIBIAL;  Surgeon: Teddy Huber MD;  Location: [facility] OR [address] [place] [address];  Service: Vascular;  Laterality: Left;    CREATION OF FEMOROPOPLITEAL ARTERIAL BYPASS USING GRAFT Left 8/18/2020    Procedure: CREATION, BYPASS, ARTERIAL, FEMORAL TO POPLITEAL, USING GRAFT, LEFT LOWER EXTREMITY;  Surgeon: Teddy Huber MD;  Location: [facility];  Service: Vascular;  Laterality: Left;  REQUEST 7:15 A.M. START----COVID NEGATIVE ON 8/17  1ST CASE STARTE PER LEANA ON 8/7/2020 @ 942AM-LO  RN PREOP 8/12/2020   T/S-----CLEARED BY CARDS-------PENDING INSURANCE    DEBRIDEMENT OF FOOT Left 9/8/2020    Procedure: DEBRIDEMENT, FOOT;  Surgeon: Rosio Mayes DPM;  Location: [facility];  Service: Podiatry;  Laterality: Left;  request  neoxx .   RN Pre Op 9-4-2020, Covid negative on 9/5/20. C A    DEBRIDEMENT OF FOOT  3/4/2021    Procedure: DEBRIDEMENT, FOOT;  Surgeon: Teddy Huber MD;  Location: [facility] OR;  Service: Vascular;;    DEBRIDEMENT OF FOOT Left 3/9/2021    Procedure: DEBRIDEMENT, FOOT, bone biopsy;  Surgeon: Rosio Mayes DPM;  Location: [facility] OR;  Service: Podiatry;  Laterality: Left;  Request neoxx---COVID IN AM  REQUESTING NOON START  RN Phone Pre op.On Blood thinners Plavix and Eliquis.  Covid am of surgery. C A    DEBRIDEMENT OF FOOT Left 5/4/2021    Procedure: DEBRIDEMENT, FOOT;  Surgeon: Farooq Morley DPM;  [address];  Service: Podiatry;  Laterality: Left;    INSERTION OF TUNNELED CENTRAL VENOUS HEMODIALYSIS CATHETER N/A 1/27/2020    Procedure: Insertion, Catheter, Central Venous, Hemodialysis;  Surgeon: ESTEBAN Gomez III, MD;  Location: [facility] CATH LAB;  Service: Peripheral Vascular;  Laterality: N/A;    INSERTION OF TUNNELED CENTRAL VENOUS HEMODIALYSIS CATHETER  5/10/2023    Procedure: Insertion, Catheter, Central Venous, Hemodialysis;  Surgeon: Romulo Queen MD;  Location: [facility] CATH LAB;  Service: Cardiology;;    PERCUTANEOUS TRANSLUMINAL ANGIOPLASTY N/A 3/4/2021    Procedure: PTA (ANGIOPLASTY, PERCUTANEOUS, TRANSLUMINAL);  Surgeon: Teddy Huber MD;  Location: [facility] OR;  Service: Vascular;  Laterality: N/A;    REMOVAL OF ARTERIOVENOUS GRAFT Left 5/27/2021    Procedure: REMOVAL, GRAFT, LEFT LOWER EXTREMITY, WOUND EXPLORATION;  Surgeon: Teddy Huber MD;  Location: [facility] OR 2ND FLR;  Service: Vascular;  Laterality: Left;    REMOVAL OF NAIL OF DIGIT Left 3/9/2021    Procedure: REMOVAL, NAIL, DIGIT;  Surgeon: Rosio Mayes DPM;  Location: [facility] OR;  Service: Podiatry;  Laterality: Left;    RIGHT HEART CATHETERIZATION Right 5/10/2023    Procedure: INSERTION, CATHETER, RIGHT HEART;  Surgeon: Romulo Queen MD;  Location: [facility] CATH LAB;  Service: Cardiology;  Laterality: Right;     THROMBECTOMY Left 3/4/2021    Procedure: THROMBECTOMY, LEFT LOWER EXTREMITY BYPASS GRAFT, ANGIOGRAM, POSSIBLE INTERVENTION, POSSIBLE LEFT LOWER EXTREMITY BYPASS;  Surgeon: Teddy Huber MD;  Location: [facility] OR;  Service: Vascular;  Laterality: Left;    THROMBECTOMY Left 4/29/2021    Procedure: GRAFT THROMBECTOMY, LEFT LOWER EXTREMITY;  Surgeon: Teddy Huber MD;  Location: [facility] OR [facility] FLR;  Service: Vascular;  Laterality: Left;  14.5 min  1179.85 mGy  341.01 Gycm2  240 ml dye    THROMBECTOMY  10/22/2021    Procedure: THROMBECTOMY;  Surgeon: Saad Arenas MD;  Location: [facility] CATH LAB/EP;  Service: Cardiology;;       Family History   Problem Relation Age of Onset    Diabetes Mother     Diabetes Father     Heart disease Maternal Grandmother     No Known Problems Maternal Grandfather     Diabetes Paternal Grandmother     No Known Problems Paternal Grandfather     Anesthesia problems Neg Hx        Social History     Socioeconomic History    Marital status:    Occupational History    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      Social Determinants of Health     Financial Resource Strain: Low Risk     Difficulty of Paying Living Expenses: Not hard at all   Food Insecurity: Unknown    Worried About Running Out of Food in the Last Year: Never true   Transportation Needs: No Transportation Needs    Lack of Transportation (Medical): No    Lack of Transportation (Non-Medical): No   Physical Activity: Inactive    Days of Exercise per Week: 0 days    Minutes of Exercise per Session: 0 min   Stress: Stress Concern Present    Feeling of Stress : Rather much   Social Connections: Moderately Isolated    Frequency of Communication with Friends and  Family: Three times a week    Frequency of Social Gatherings with Friends and Family: Three times a week    Attends L[employer]-day Services: Never    Active Member of Clubs or Organizations: No    Attends Club or Organization Meetings: Never    Marital Status:    Housing Stability: Low Risk     Unable to Pay for Housing in the Last Year: No    Number of Places Lived in the Last Year: 2    Unstable Housing in the Last Year: No       Current Facility-Administered Medications   Medication Dose Route Frequency Provider Last Rate Last Admin    acetaminophen tablet 1,000 mg  1,000 mg Oral Q8H PRN Chandrakant Oakley MD   1,000 mg at 05/19/23 2347    allopurinol split tablet 50 mg  50 mg Oral Every other day Batsheva Sleem, DO   50 mg at 05/21/23 1000    amitriptyline tablet 50 mg  50 mg Oral QHS Roosevelt Lazcano MD   50 mg at 05/21/23 2132    atorvastatin tablet 80 mg  80 mg Oral Daily Chandrakant Oakley MD   80 mg at 05/22/23 0915    calcitRIOL capsule 0.5 mcg  0.5 mcg Oral Daily Sy Crisostomo MD   0.5 mcg at 05/22/23 0915    calcium acetate(phosphat bind) capsule 1,334 mg  1,334 mg Oral TID WM Batsheva Sleem, DO   1,334 mg at 05/22/23 0914    carvediloL tablet 6.25 mg  6.25 mg Oral BID Chandrakant Oakley MD   6.25 mg at 05/22/23 0915    dextrose 10% bolus 125 mL 125 mL  12.5 g Intravenous PRN Chandrakant Oakley MD        dextrose 10% bolus 250 mL 250 mL  25 g Intravenous PRN Chandrakant Oakley MD        dextrose 40 % gel 15,000 mg  15 g Oral PRN Chandrakant Oakley MD        dextrose 40 % gel 30,000 mg  30 g Oral PRN Chandrakant Oakley MD        famotidine tablet 20 mg  20 mg Oral Daily Batsheva Sleem, DO   20 mg at 05/22/23 0915    glucagon (human recombinant) injection 1 mg  1 mg Intramuscular PRN Chandrakant Oakley MD        heparin 25,000 units in dextrose 5% (100 units/ml) IV bolus from bag - ADDITIONAL PRN BOLUS - 30 units/kg  30 Units/kg (Adjusted) Intravenous PRN Batsheva Sleem, DO        heparin 25,000 units in dextrose 5% (100 units/ml) IV bolus  from bag - ADDITIONAL PRN BOLUS - 60 units/kg  60 Units/kg (Adjusted) Intravenous PRN Batsheva Sleem, DO        heparin 25,000 units in dextrose 5% 250 mL (100 units/mL) infusion LOW INTENSITY nomogram - [facility]  0-40 Units/kg/hr (Adjusted) Intravenous Continuous Batsheva Sleem, DO 6.6 mL/hr at 05/22/23 1330 9 Units/kg/hr at 05/22/23 1330    hydrALAZINE tablet 75 mg  75 mg Oral TID Anna Lopez MD   75 mg at 05/22/23 1418    HYDROmorphone tablet 2 mg  2 mg Oral BID PRN Chandrakant Oakley MD   2 mg at 05/22/23 1418    insulin aspart U-100 pen 0-5 Units  0-5 Units Subcutaneous QID (AC + HS) PRN Chandrakant Oakley MD   2 Units at 05/13/23 0001    insulin detemir U-100 (Levemir) pen 8 Units  8 Units Subcutaneous QHS Chandrakant Oakley MD   8 Units at 05/20/23 2100    isosorbide dinitrate tablet 40 mg  40 mg Oral TID Anna Lopez MD   40 mg at 05/22/23 1419    melatonin tablet 6 mg  6 mg Oral Nightly PRN Norman Mackey MD   6 mg at 05/13/23 2216    naloxone 0.4 mg/mL injection 0.02 mg  0.02 mg Intravenous PRN Chandrakant Oakley MD        ondansetron disintegrating tablet 8 mg  8 mg Oral Q8H PRN Edison Shipman MD        sertraline tablet 50 mg  50 mg Oral Daily Chandrakant Oakley MD   50 mg at 05/22/23 0915    sevelamer carbonate tablet 800 mg  800 mg Oral TID  Sy Crisostomo MD   800 mg at 05/22/23 0915    sodium chloride 0.9% flush 10 mL  10 mL Intravenous Q12H PRN Chandrakant Oakley MD        sodium chloride 0.9% flush 10 mL  10 mL Intravenous PRN Edison Shipman MD        sodium chloride 0.9% flush 10 mL  10 mL Intravenous PRN Juan Bowman MD           [unfilled]    Review of patient's allergies indicates:   Allergen Reactions    Ciprofloxacin Itching    Contrast media      Kidney injury    Iodine      Kidney injury        ROS       Physical Exam     Problem List Items Addressed This Visit          Unprioritized    CKD (chronic kidney disease), stage IV (Chronic)    Above-knee amputation of right lower extremity    Acute  hypoxemic respiratory failure    * (Principal) Acute on chronic combined systolic and diastolic heart failure    ROSLYN (acute kidney injury)    CAD (coronary artery disease)    Overview     Formatting of this note might be different from the original.  Last Assessment & Plan:   Formatting of this note might be different from the original.  -- Optimize glucose control.           Congestive heart failure    Debility    Essential hypertension    CAROLIN (generalized anxiety disorder)    Hypoalbuminemia    Hypocalcemia    Impaired functional mobility and endurance    Leg swelling    Paroxysmal atrial fibrillation    Peripheral neuropathic pain    S/P CABG x 1    Status post above knee amputation, left - Primary    Status post above-knee amputation of both lower extremities    Uremia     Other Visit Diagnoses       Chest pain        CHF (congestive heart failure)        MDD (major depressive disorder), recurrent episode, mild        Adjustment disorder with anxiety             Labs:  PT and PTT    Impression and plan:  ROSLYN with CKD IV. For cuffed tunneled HD cath. insertion.  Please hold heparin 10 mid night Will need PT and PTT at 4:00Am. (6 hours after stopping Heparin) Please keep her NPO after midnight.   Primary HTN with well controlled BP.   CAD post CABG.   CHD  Atrial fibrillation.   PVD with Rt. above knee amputation.  Had Fem popliteal bypass.         DREW DAS.Yashira. MD. MARYCHUY. FACP.  , [employer] / [employer]  Nephrology Consultant. [employer] [facility].   [address],  [facility]. 5th [place] [address].    email: [email or web address].  Tel: Office: [phone]    Rt. IJ            Lt IJ

## 2023-05-22 NOTE — PT/OT/SLP PROGRESS
Physical Therapy Treatment    Patient Name:  Suyapa Connelly   MRN:  [id number]    Recommendations:     Discharge Recommendations: other (see comments)  Discharge Equipment Recommendations: drop arm commode, lift device, hospital bed  Barriers to discharge:  Decreased functional mobility and fall risk    Assessment:     Suyapa Connelly is a 57 y.o. female admitted with a medical diagnosis of Acute on chronic combined systolic and diastolic heart failure.  She presents with the following impairments/functional limitations: weakness, impaired endurance, impaired self care skills, impaired functional mobility, impaired balance, decreased lower extremity function, pain, impaired cardiopulmonary response to activity. Pt limited by increased pain this session. Pt SBA with bed mobility for repositioning and able to performed B UE exercises. Pt would benefit from continued skilled PT intervention to address functional mobility and strength deficits.    Rehab Prognosis: Fair; patient would benefit from acute skilled PT services to address these deficits and reach maximum level of function.    Recent Surgery: Procedure(s) (LRB):  INSERTION, CATHETER, RIGHT HEART (Right)  Insertion, Catheter, Central Venous, Hemodialysis 12 Days Post-Op    Plan:     During this hospitalization, patient to be seen 3 x/week to address the identified rehab impairments via therapeutic activities, therapeutic exercises, neuromuscular re-education, wheelchair management/training and progress toward the following goals:    Plan of Care Expires:  06/18/23    Subjective     Chief Complaint: pain  Patient/Family Comments/goals: to return home  Pain/Comfort:  Pain Rating 1: 10/10  Location - Side 1: Bilateral  Location - Orientation 1: generalized  Location 1: thigh  Pain Addressed 1: Reposition, Distraction, Nurse notified  Pain Rating Post-Intervention 1: 10/10      Objective:     Communicated with RN prior to session.  Patient found HOB elevated with  telemetry, snow catheter, peripheral IV upon PT entry to room.     General Precautions: Standard, fall  Orthopedic Precautions: N/A  Braces: N/A  Respiratory Status: Room air     Functional Mobility:  Bed Mobility:     Rolling Left:  stand by assistance  Rolling Right: stand by assistance  Supine to Sit: stand by assistance  Sit to Supine: stand by assistance  Balance:  Static sitting balance: FAIR-: Maintains without assist but inconsistent       AM-PAC 6 CLICK MOBILITY  Turning over in bed (including adjusting bedclothes, sheets and blankets)?: 3  Sitting down on and standing up from a chair with arms (e.g., wheelchair, bedside commode, etc.): 1  Moving from lying on back to sitting on the side of the bed?: 3  Moving to and from a bed to a chair (including a wheelchair)?: 1  Need to walk in hospital room?: 1  Climbing 3-5 steps with a railing?: 1  Basic Mobility Total Score: 10       Treatment & Education:  Pt performed seated bicep curls, overhead punches, and punch outs with B UE x 10 reps each arm. Cues needed for completing and staying on tasks.  Pt educated on:  - Role of PT and POC/goals for therapy   - Safety with mobility and fall risk   - Instructed to call nursing staff for assistance with mobility as needed     Pt verbalized understanding and expressed no further concerns/questions.       Patient left right sidelying with all lines intact, call button in reach, and bed alarm on..    GOALS:   Multidisciplinary Problems       Physical Therapy Goals          Problem: Physical Therapy    Goal Priority Disciplines Outcome Goal Variances Interventions   Physical Therapy Goal     PT, PT/OT Ongoing, Progressing     Description: Goals to be met by: 2023     Patient will increase functional independence with mobility by performin. Supine to sit with Minimal Assistance.  2. Sit to supine with Minimal Assistance.  3. Bed to chair transfer with Minimal Assistance using LRAD.  4. Wheelchair propulsion  x50 feet with Minimal Assistance using bilateral upper extremities.  5. Spouse verbalizes and demonstrates understanding on family training with focus on transfers.                          Time Tracking:     PT Received On: 05/22/23  PT Start Time: 1341     PT Stop Time: 1407  PT Total Time (min): 26 min     Billable Minutes: Therapeutic Activity 16 and Therapeutic Exercise 10    Treatment Type: Treatment  PT/PTA: PTA     Number of PTA visits since last PT visit: 1     05/22/2023

## 2023-05-22 NOTE — PROGRESS NOTES
[facility] - Cardiology [facility] Medicine  Progress Note    Patient Name: Suyapa Connelly  MRN: [id number]  Patient Class: IP- Inpatient   Admission Date: 5/1/2023  Length of Stay: 19 days  Attending Physician: Batsheva Torres DO  Primary Care Provider: Magen Christensen MD        Subjective:     Principal Problem:Acute on chronic combined systolic and diastolic heart failure        HPI:  57 y.o. female with past medical history of CAD s/p CABG, CKD, DM, HTN, HLD, s/p bilateral AKA who presnets with chest pain, difficulty breathing, leg swelling.     Per ED She has been unable to access any of her medications for the last 3 months. Over the last few months she has noted worsening shortness of breath, particulatly orthopnea, and leg swelling. In the last two weeks she endorses associated abdominal pain and swelling, reduced appetite, and pleuritic chest pain with deep inspiration. She endorses occasional lighthededness with changes in position. Last week she had an episode where she was unable to breathe for about two minutes which was very upsetting for her and her , but she recovered and did not seek care at that time. Today, she complains of significant difficulty breathing, swelling in her legs and abdomen, and reduced appetite.     No headaches, visual changes, URI symptoms, palpitations, nausea, vomiting, diarrhea, blood in her stool, dysuria, hematuria, numbness or weakness in her extremities. She does endorse occasional paresthesias associated with her chronic phantom limb pain. She additionally has had increased difficulties with depression recently, denies suicidal or homicidal ideation, but has had considerable stress and low mood.      Of note, the patient saw a new primary care provider 4/21 for similar concerns who recommended she proceed to the ED at that time, but they were unable due to financial and geographic concerns. She lives two hours away from [facility] and her  cares for her and  elderly mother with dementia. She has had decreased access to medical care over the last year, and unable to access most of her prescriptions. She had a 90 day supply of four medications (lasix, statin, clopidogrel, sertraline) but these were accidentally lost and she has been unable to afford a replacement. They present today for evaluation and to reestablish care and access to her prescriptions.     Labs on admission significant for an elevated BNP, hypocalcemia which corrected is 8.3, significant elevation in Creatinine and a slight elevation of troponin    CXR  Cardiomegaly with pulmonary interstitial edema, suggestive of pulmonary edema secondary to CHF.      Overview/Hospital Course:  Diuresis initiated with Lasix IV.  1.2L UOP in the past 24 hours.  Vitals reassuring with adequate sats on ambient air.  Still very edematious; Cardiology recs lasix gtt.  GDMT initiated. Pt given 2mg morphine for moderate to severe pain in legs from swelling. Diuresis increased to 40mg/hr due to inadequate urine output. Cr continued to increase. Cr stable at new Lasix rate. Diuresed 1.9L with some improvement in clinical status. S/p RHC that showed wedge of 26-32 with CVP of 15. Will continue diuresis with Lasix with the addition of metolazone and acetazolamide. Discussions had with nephrology about electrolyte abnormalities. GOC discussions underway.       Interval History: Patient with  altered mental status, no improvement in renal function. Patient might need tunneled cath. Interventional Nephrology and nephrology following.    Review of Systems   Unable to perform ROS: Mental status change   Objective:     Vital Signs (Most Recent):  Temp: 98 °F (36.7 °C) (05/22/23 0755)  Pulse: 79 (05/22/23 0755)  Resp: 18 (05/22/23 0755)  BP: (!) 109/55 (05/22/23 0755)  SpO2: 95 % (05/22/23 0755) Vital Signs (24h Range):  Temp:  [41 °F (5 °C)-98.3 °F (36.8 °C)] 98 °F (36.7 °C)  Pulse:  [79-84] 79  Resp:  [17-20] 18  SpO2:  [93 %-97 %] 95  %  BP: (102-123)/(51-59) 109/55     Weight:  (bed scale not working)  Body mass index is 35.88 kg/m².    Intake/Output Summary (Last 24 hours) at 5/22/2023 0947  Last data filed at 5/22/2023 0601  Gross per 24 hour   Intake 222 ml   Output 1100 ml   Net -878 ml         Physical Exam  Constitutional:       General: She is not in acute distress.  HENT:      Right Ear: External ear normal.      Left Ear: External ear normal.      Nose: No congestion or rhinorrhea.      Mouth/Throat:      Mouth: Mucous membranes are moist.   Cardiovascular:      Rate and Rhythm: Normal rate and regular rhythm.   Pulmonary:      Effort: No respiratory distress.      Breath sounds: Normal breath sounds. No wheezing or rales.   Abdominal:      General: There is no distension.      Tenderness: There is no abdominal tenderness. There is no rebound.   Musculoskeletal:      Cervical back: No rigidity or tenderness.      Comments: Bilateral lower extremity amputation   Neurological:      Mental Status: She is disoriented.           Significant Labs: All pertinent labs within the past 24 hours have been reviewed.  Recent Lab Results  (Last 5 results in the past 24 hours)        05/22/23  1202   05/22/23  1200   05/22/23  0757   05/22/23  0522   05/21/23  2131        Anion Gap       14         aPTT 40.8  Comment: Refer to local heparin nomogram for intensity/dose specific   therapeutic   range.         47.1  Comment: Refer to local heparin nomogram for intensity/dose specific   therapeutic   range.           Baso #       0.07         Basophil %       1.0         BUN       92         Calcium       8.2         Chloride       92         CO2       25         Creatinine       7.4         Differential Method       Automated         eGFR       6.0         Eos #       0.3         Eosinophil %       3.6         Glucose       107         Gran # (ANC)       4.7         Gran %       67.2         Hematocrit       28.1         Hemoglobin       8.4          Immature Grans (Abs)       0.02  Comment: Mild elevation in immature granulocytes is non specific and   can be seen in a variety of conditions including stress response,   acute inflammation, trauma and pregnancy. Correlation with other   laboratory and clinical findings is essential.           Immature Granulocytes       0.3         Lymph #       0.9         Lymph %       12.5         Magnesium       1.9         MCH       30.9         MCHC       29.9         MCV       103         Mono #       1.1         Mono %       15.4         MPV       10.7         nRBC       0         Phosphorus       6.4         Platelets       332         POCT Glucose   98   111     113       Potassium       3.5         RBC       2.72         RDW       13.7         Sodium       131         WBC       7.03                                Significant Imaging: I have reviewed all pertinent imaging results/findings within the past 24 hours.      Assessment/Plan:      * Acute on chronic combined systolic and diastolic heart failure  Last Echo 6/21 showing     The estimated ejection fraction is 55%.   The left ventricle is normal in size with normal systolic function.   Normal left ventricular diastolic function.   Normal right ventricular size with normal right ventricular systolic function.   Mild tricuspid regurgitation.   The estimated PA systolic pressure is 31 mmHg.   Normal central venous pressure (3 mmHg).    New Echo   The left ventricle is mildly enlarged with severely decreased systolic function. The estimated ejection fraction is 15%.   There is severe left ventricular global hypokinesis.   Normal right ventricular size with low normal right ventricular systolic function.   Grade I left ventricular diastolic dysfunction.   Biatrial enlargement.   Mild-to-moderate mitral regurgitation.   Severe tricuspid regurgitation.   The estimated PA systolic pressure is 43 mmHg.   Elevated central venous pressure (15 mmHg).            RHC  CVP 15  Wedge 25-32    Patient did not tolerate dobutamine, had chest pain. Dobutamine dc'd  Per cardiology the patients significant peripheral vascular disease prohibits advanced options    -Holding diuretics per nephrology  -Lasix converted to oral torsemide  -Metolazone 10 daily oral  -Acetazolamide 500 BID   -BMPs BID  -Consult Interventional nephrology for tunneled line placement  -Antihypertensive. GDMT, Anticoagulation, and diuresis management per Nephrology and Cardiology in a multidisciplinary team  -Qq4 BMPs with electrolyte repletion as needed; goal K>4, Mg>2  - 1.5L fluid restriction, low Na diet  - continue GDMT when possible  -Tylenol 1g up to 3 times a day            Congestive heart failure  See primary problem    Echo    Interpretation Summary  · The left ventricle is mildly enlarged with severely decreased systolic function. The estimated ejection fraction is 15%.  · There is severe left ventricular global hypokinesis.  · Normal right ventricular size with low normal right ventricular systolic function.  · Grade I left ventricular diastolic dysfunction.  · Biatrial enlargement.  · Mild-to-moderate mitral regurgitation.  · Severe tricuspid regurgitation.  · The estimated PA systolic pressure is 43 mmHg.  · Elevated central venous pressure (15 mmHg).    Will continue to hold diuresis per nephro recs    Myalgia  Will continue to treat pain as needed      ROSLYN (acute kidney injury)    ROSLYN on CKD, likely ischemic, CRS and diuresis  Baseline serum creatinine in the 2s, up to 3.9 on admission; up to 7.4 on her most recent labs, peaked around 7.6 over the weekend   Retroperitoneal US with no hydronephrosis    ECHO with severe systolic dysfunction, EF of 15%    RHC with PCWP of 30   UOP of 1.4L over the past 24h while off diuretic; will continue holding diuretics for now   Consider repeating RHC during this admission to assess response to diuresis   No emergent indication for dialysis at this  time but pt remains at high risk of requiring hemodialysis in the near future if persistent deterioration of her renal function    Dose medications to GFR    Avoid nephrotoxic agents as much as possible     Acute hypoxemic respiratory failure  Resolved    CKD (chronic kidney disease), stage IV  History of CKD. See ROSLYN.      Above-knee amputation of right lower extremity  Prior history consistent with exam      Uses self-applied continuous glucose monitoring device  POCT glucose and daily CMPs    -Restart insulin for hyperglycemia      Dermatitis associated with moisture  Large body habitus with dermatitis to folds. Will monitor for infection.    -Zinc based powder  -Topical abx if indicated      Status post above knee amputation, left        Hypoalbuminemia  Pt with poor PO intake reported. Pt eats one meal a day and has associated nausea and vomiting. Will try antiemetics before feeds to improve intake. Will monitor daily intake and calorie replacement.     -Calorie count  -Diabetic/Renal diet  -Antiemetics before meals; Zofran 4 IV PRN  -Monitor Qtc for prolongation with antiemetics      Uremia  Elevating BUN. Worsening insomnia.   Will discuss goals and plan with nephrology.   5/22 encephalopathic      Impaired functional mobility and endurance  PT/OT for bedbound patient      Paroxysmal atrial fibrillation  Continue home rate control  NSR on EKG            Anemia  Asymptomatic. Higher than 8 months ago. No signs of acute bleed at this time. Haptoglobin elevated.    -CBC daily  -Transfuse if <7        S/P CABG x 1  History of. Sternal scar well healed.       CAD (coronary artery disease)  History of CAD with retrosternal chest pain. EKG negative to ST changes. Nonspecific T wave inversions.     -Cardiac tele  -EKG prn  -Bidil per Cards recs  -Hold anticoagulation for line placement; continue heparin    CAROLIN (generalized anxiety disorder)  Continue home anxiety medications      Essential hypertension  Continue home  antihypertensives      Hypocalcemia  Ionized calcium at 0.76. Ca correcting at 8.4.     -Calcium acetate for calcium repletion and for phos binding.       PAD (peripheral artery disease)  History of bilateral AKA.           VTE Risk Mitigation (From admission, onward)         Ordered     heparin 25,000 units in dextrose 5% (100 units/ml) IV bolus from bag - ADDITIONAL PRN BOLUS - 60 units/kg  As needed (PRN)        Question:  Heparin Infusion Adjustment (DO NOT MODIFY ANSWER)  Answer:  [email or web address]\epic\Images\Pharmacy\HeparinInfusions\heparin LOW INTENSITY nomogram for [facility] [id number]    05/21/23 0737     heparin 25,000 units in dextrose 5% (100 units/ml) IV bolus from bag - ADDITIONAL PRN BOLUS - 30 units/kg  As needed (PRN)        Question:  Heparin Infusion Adjustment (DO NOT MODIFY ANSWER)  Answer:  [email or web address]\epic\Images\Pharmacy\HeparinInfusions\heparin LOW INTENSITY nomogram for [facility] [id number]    05/21/23 0737     heparin 25,000 units in dextrose 5% 250 mL (100 units/mL) infusion LOW INTENSITY nomogram - [facility]  Continuous        Question Answer Comment   Heparin Infusion Adjustment (DO NOT MODIFY ANSWER) [email or web address]\epic\Images\Pharmacy\HeparinInfusions\heparin LOW INTENSITY nomogram for [facility] [id number]    Begin at (in units/kg/hr) 12        05/21/23 0737     IP VTE HIGH RISK PATIENT  Once         05/07/23 1547                Discharge Planning   DEVAN: 5/25/2023     Code Status: Full Code   Is the patient medically ready for discharge?: No    Reason for patient still in hospital (select all that apply): Patient unstable  Discharge Plan A: Home Health   Discharge Delays: None known at this time              Norman Mackey MD  Department of Hospital Medicine   [facility] - Cardiology Stepdown

## 2023-05-22 NOTE — SUBJECTIVE & OBJECTIVE
Interval History: Seen and evaluated by bedside. UOP of 1.4L over the past 24h while off diuretics. Serum Cr remains elevated at 7.4 this AM.     Review of patient's allergies indicates:   Allergen Reactions    Ciprofloxacin Itching    Contrast media      Kidney injury    Iodine      Kidney injury     Current Facility-Administered Medications   Medication Frequency    acetaminophen tablet 1,000 mg Q8H PRN    allopurinol split tablet 50 mg Every other day    amitriptyline tablet 50 mg QHS    atorvastatin tablet 80 mg Daily    calcitRIOL capsule 0.5 mcg Daily    calcium acetate(phosphat bind) capsule 1,334 mg TID WM    carvediloL tablet 6.25 mg BID    dextrose 10% bolus 125 mL 125 mL PRN    dextrose 10% bolus 250 mL 250 mL PRN    dextrose 40 % gel 15,000 mg PRN    dextrose 40 % gel 30,000 mg PRN    famotidine tablet 20 mg Daily    glucagon (human recombinant) injection 1 mg PRN    heparin 25,000 units in dextrose 5% (100 units/ml) IV bolus from bag - ADDITIONAL PRN BOLUS - 30 units/kg PRN    heparin 25,000 units in dextrose 5% (100 units/ml) IV bolus from bag - ADDITIONAL PRN BOLUS - 60 units/kg PRN    heparin 25,000 units in dextrose 5% 250 mL (100 units/mL) infusion LOW INTENSITY nomogram - [facility] Continuous    hydrALAZINE tablet 75 mg TID    HYDROmorphone tablet 2 mg BID PRN    insulin aspart U-100 pen 0-5 Units QID (AC + HS) PRN    insulin detemir U-100 (Levemir) pen 8 Units QHS    isosorbide dinitrate tablet 40 mg TID    melatonin tablet 6 mg Nightly PRN    naloxone 0.4 mg/mL injection 0.02 mg PRN    ondansetron disintegrating tablet 8 mg Q8H PRN    potassium chloride SA CR tablet 40 mEq Once    sertraline tablet 50 mg Daily    sevelamer carbonate tablet 800 mg TID WM    sodium chloride 0.9% flush 10 mL Q12H PRN    sodium chloride 0.9% flush 10 mL PRN       Objective:     Vital Signs (Most Recent):  Temp: 98.3 °F (36.8 °C) (05/22/23 0410)  Pulse: 80 (05/22/23 0410)  Resp: 20 (05/22/23 0410)  BP: (!) 106/56 (05/22/23  0410)  SpO2: 96 % (05/22/23 0410) Vital Signs (24h Range):  Temp:  [41 °F (5 °C)-98.3 °F (36.8 °C)] 98.3 °F (36.8 °C)  Pulse:  [76-84] 80  Resp:  [16-20] 20  SpO2:  [93 %-97 %] 96 %  BP: (102-123)/(51-59) 106/56     Weight:  (bed scale not working) (05/22/23 0500)  Body mass index is 35.88 kg/m².  Body surface area is 2.12 meters squared.    I/O last 3 completed shifts:  In: 878 [P.O.:878]  Out: 1450 [Urine:1450]     Physical Exam  Vitals and nursing note reviewed.   Constitutional:       General: She is not in acute distress.     Appearance: Normal appearance. She is obese. She is not ill-appearing or diaphoretic.   HENT:      Head: Normocephalic and atraumatic.      Right Ear: External ear normal.      Left Ear: External ear normal.      Nose: Nose normal.      Mouth/Throat:      Mouth: Mucous membranes are moist.      Pharynx: Oropharynx is clear.   Eyes:      General: No scleral icterus.     Extraocular Movements: Extraocular movements intact.      Conjunctiva/sclera: Conjunctivae normal.      Pupils: Pupils are equal, round, and reactive to light.   Cardiovascular:      Rate and Rhythm: Normal rate and regular rhythm.      Pulses: Normal pulses.      Heart sounds: Normal heart sounds. No murmur heard.  Pulmonary:      Effort: Pulmonary effort is normal. No respiratory distress.      Breath sounds: Normal breath sounds. No stridor. No wheezing or rhonchi.      Comments: On NC  Abdominal:      General: Abdomen is flat. There is no distension.      Palpations: Abdomen is soft.      Tenderness: There is no abdominal tenderness. There is no guarding.   Musculoskeletal:         General: Swelling present. No tenderness. Normal range of motion.      Cervical back: Normal range of motion and neck supple. No rigidity or tenderness.      Right lower leg: Edema present.      Left lower leg: Edema present.      Comments: Trigger finger R middle finger  BL AKA stump edema +2  L thigh tenderness with palpation    Skin:      General: Skin is warm and dry.      Capillary Refill: Capillary refill takes less than 2 seconds.      Coloration: Skin is not jaundiced.      Findings: No erythema.   Neurological:      General: No focal deficit present.      Mental Status: She is alert and oriented to person, place, and time.   Psychiatric:         Mood and Affect: Mood normal.         Behavior: Behavior normal.        Significant Labs:  CBC:   Recent Labs   Lab 05/22/23  0522   WBC 7.03   RBC 2.72*   HGB 8.4*   HCT 28.1*      *   MCH 30.9   MCHC 29.9*       CMP:   Recent Labs   Lab 05/18/23  0428 05/18/23  1835 05/22/23  0522      < > 107   CALCIUM 7.4*   < > 8.2*   ALBUMIN 2.3*  --   --    PROT 7.2  --   --    *   < > 131*   K 4.4   < > 3.5   CO2 25   < > 25   CL 93*   < > 92*   BUN 80*   < > 92*   CREATININE 6.7*   < > 7.4*   ALKPHOS 181*  --   --    ALT 6*  --   --    AST 25  --   --    BILITOT 0.3  --   --     < > = values in this interval not displayed.

## 2023-05-22 NOTE — ASSESSMENT & PLAN NOTE
See primary problem    Echo    Interpretation Summary  · The left ventricle is mildly enlarged with severely decreased systolic function. The estimated ejection fraction is 15%.  · There is severe left ventricular global hypokinesis.  · Normal right ventricular size with low normal right ventricular systolic function.  · Grade I left ventricular diastolic dysfunction.  · Biatrial enlargement.  · Mild-to-moderate mitral regurgitation.  · Severe tricuspid regurgitation.  · The estimated PA systolic pressure is 43 mmHg.  · Elevated central venous pressure (15 mmHg).    Will continue to hold diuresis per nephro recs

## 2023-05-22 NOTE — PROGRESS NOTES
[facility] - [facility] Stepdown  Nephrology  Progress Note    Patient Name: Suyapa Connelly  MRN: [id number]  Admission Date: 5/1/2023  Hospital Length of Stay: 19 days  Attending Provider: Batsheva Torres DO   Primary Care Physician: Magen Christensen MD  Principal Problem:Acute on chronic combined systolic and diastolic heart failure    Subjective:     HPI: 57 y.o. female with past medical history of CAD s/p CABG, CKD, DM, HTN, HLD, s/p bilateral AKA who presnets with chest pain, difficulty breathing, leg swelling. Her most recent ECHO revealed severe systolic dysfunction with EF of 15%. She has been started on diuretics with IV lasix and PO metolazone with adequate urinary response however her renal function has been deteriorating throughout the admission. She reports requiring dialysis at one time before, around the time she underwent her AKAs. Her baseline serum creatinine is in the 2s and it has trended up to 5 during this admission. Nephrology consulted for further evaluation of ROSLYN on CKD.         Interval History: Seen and evaluated by bedside. UOP of 1.4L over the past 24h while off diuretics. Serum Cr remains elevated at 7.4 this AM.     Review of patient's allergies indicates:   Allergen Reactions    Ciprofloxacin Itching    Contrast media      Kidney injury    Iodine      Kidney injury     Current Facility-Administered Medications   Medication Frequency    acetaminophen tablet 1,000 mg Q8H PRN    allopurinol split tablet 50 mg Every other day    amitriptyline tablet 50 mg QHS    atorvastatin tablet 80 mg Daily    calcitRIOL capsule 0.5 mcg Daily    calcium acetate(phosphat bind) capsule 1,334 mg TID WM    carvediloL tablet 6.25 mg BID    dextrose 10% bolus 125 mL 125 mL PRN    dextrose 10% bolus 250 mL 250 mL PRN    dextrose 40 % gel 15,000 mg PRN    dextrose 40 % gel 30,000 mg PRN    famotidine tablet 20 mg Daily    glucagon (human recombinant) injection 1 mg PRN    heparin 25,000 units in dextrose 5% (100  units/ml) IV bolus from bag - ADDITIONAL PRN BOLUS - 30 units/kg PRN    heparin 25,000 units in dextrose 5% (100 units/ml) IV bolus from bag - ADDITIONAL PRN BOLUS - 60 units/kg PRN    heparin 25,000 units in dextrose 5% 250 mL (100 units/mL) infusion LOW INTENSITY nomogram - [facility] Continuous    hydrALAZINE tablet 75 mg TID    HYDROmorphone tablet 2 mg BID PRN    insulin aspart U-100 pen 0-5 Units QID (AC + HS) PRN    insulin detemir U-100 (Levemir) pen 8 Units QHS    isosorbide dinitrate tablet 40 mg TID    melatonin tablet 6 mg Nightly PRN    naloxone 0.4 mg/mL injection 0.02 mg PRN    ondansetron disintegrating tablet 8 mg Q8H PRN    potassium chloride SA CR tablet 40 mEq Once    sertraline tablet 50 mg Daily    sevelamer carbonate tablet 800 mg TID WM    sodium chloride 0.9% flush 10 mL Q12H PRN    sodium chloride 0.9% flush 10 mL PRN       Objective:     Vital Signs (Most Recent):  Temp: 98.3 °F (36.8 °C) (05/22/23 0410)  Pulse: 80 (05/22/23 0410)  Resp: 20 (05/22/23 0410)  BP: (!) 106/56 (05/22/23 0410)  SpO2: 96 % (05/22/23 0410) Vital Signs (24h Range):  Temp:  [41 °F (5 °C)-98.3 °F (36.8 °C)] 98.3 °F (36.8 °C)  Pulse:  [76-84] 80  Resp:  [16-20] 20  SpO2:  [93 %-97 %] 96 %  BP: (102-123)/(51-59) 106/56     Weight:  (bed scale not working) (05/22/23 0500)  Body mass index is 35.88 kg/m².  Body surface area is 2.12 meters squared.    I/O last 3 completed shifts:  In: 878 [P.O.:878]  Out: 1450 [Urine:1450]    Physical Exam  Vitals and nursing note reviewed.   Constitutional:       General: She is not in acute distress.     Appearance: Normal appearance. She is obese. She is not ill-appearing or diaphoretic.   HENT:      Head: Normocephalic and atraumatic.      Right Ear: External ear normal.      Left Ear: External ear normal.      Nose: Nose normal.      Mouth/Throat:      Mouth: Mucous membranes are moist.      Pharynx: Oropharynx is clear.   Eyes:      General: No scleral icterus.     Extraocular Movements:  Extraocular movements intact.      Conjunctiva/sclera: Conjunctivae normal.      Pupils: Pupils are equal, round, and reactive to light.   Cardiovascular:      Rate and Rhythm: Normal rate and regular rhythm.      Pulses: Normal pulses.      Heart sounds: Normal heart sounds. No murmur heard.  Pulmonary:      Effort: Pulmonary effort is normal. No respiratory distress.      Breath sounds: Normal breath sounds. No stridor. No wheezing or rhonchi.      Comments: On NC  Abdominal:      General: Abdomen is flat. There is no distension.      Palpations: Abdomen is soft.      Tenderness: There is no abdominal tenderness. There is no guarding.   Musculoskeletal:         General: Swelling present. No tenderness. Normal range of motion.      Cervical back: Normal range of motion and neck supple. No rigidity or tenderness.      Right lower leg: Edema present.      Left lower leg: Edema present.      Comments: Trigger finger R middle finger  BL AKA stump edema +2  L thigh tenderness with palpation    Skin:     General: Skin is warm and dry.      Capillary Refill: Capillary refill takes less than 2 seconds.      Coloration: Skin is not jaundiced.      Findings: No erythema.   Neurological:      General: No focal deficit present.      Mental Status: She is alert and oriented to person, place, and time.   Psychiatric:         Mood and Affect: Mood normal.         Behavior: Behavior normal.        Significant Labs:  CBC:   Recent Labs   Lab 05/22/23  0522   WBC 7.03   RBC 2.72*   HGB 8.4*   HCT 28.1*      *   MCH 30.9   MCHC 29.9*       CMP:   Recent Labs   Lab 05/18/23  0428 05/18/23  1835 05/22/23  0522      < > 107   CALCIUM 7.4*   < > 8.2*   ALBUMIN 2.3*  --   --    PROT 7.2  --   --    *   < > 131*   K 4.4   < > 3.5   CO2 25   < > 25   CL 93*   < > 92*   BUN 80*   < > 92*   CREATININE 6.7*   < > 7.4*   ALKPHOS 181*  --   --    ALT 6*  --   --    AST 25  --   --    BILITOT 0.3  --   --     < > =  values in this interval not displayed.            Assessment/Plan:     Cardiac/Vascular  * Acute on chronic combined systolic and diastolic heart failure  Management per primary       S/P CABG x 1  Management per primary       Renal/  ROSLYN (acute kidney injury)  ROSLYN on CKD, likely ischemic, CRS and diuresis  Baseline serum creatinine in the 2s, up to 3.9 on admission; up to 7.4 on her most recent labs, peaked around 7.6 over the weekend   Retroperitoneal US with no hydronephrosis    ECHO with severe systolic dysfunction, EF of 15%    RHC with PCWP of 30   UOP of 1.4L over the past 24h while off diuretic; will continue holding diuretics for now   Consider repeating RHC during this admission to assess response to diuresis   No emergent indication for dialysis at this time but pt remains at high risk of requiring hemodialysis in the near future if persistent deterioration of her renal function    Dose medications to GFR    Avoid nephrotoxic agents as much as possible              Jeremy Juan MD  Nephrology  [facility] - Cardiology Stepdown    ATTENDING PHYSICIAN ATTESTATION  I have personally verified the history and examined the patient. I thoroughly reviewed the demographic, clinical, laboratorial and imaging information available in medical records. I agree with the assessment and recommendations provided by the subspecialty resident who was under my supervision.

## 2023-05-22 NOTE — SUBJECTIVE & OBJECTIVE
Interval History: Patient with  altered mental status, no improvement in renal function. Patient might need tunneled cath. Interventional Nephrology and nephrology following.    Review of Systems   Unable to perform ROS: Mental status change   Objective:     Vital Signs (Most Recent):  Temp: 98 °F (36.7 °C) (05/22/23 0755)  Pulse: 79 (05/22/23 0755)  Resp: 18 (05/22/23 0755)  BP: (!) 109/55 (05/22/23 0755)  SpO2: 95 % (05/22/23 0755) Vital Signs (24h Range):  Temp:  [41 °F (5 °C)-98.3 °F (36.8 °C)] 98 °F (36.7 °C)  Pulse:  [79-84] 79  Resp:  [17-20] 18  SpO2:  [93 %-97 %] 95 %  BP: (102-123)/(51-59) 109/55     Weight:  (bed scale not working)  Body mass index is 35.88 kg/m².    Intake/Output Summary (Last 24 hours) at 5/22/2023 0947  Last data filed at 5/22/2023 0601  Gross per 24 hour   Intake 222 ml   Output 1100 ml   Net -878 ml         Physical Exam  Constitutional:       General: She is not in acute distress.  HENT:      Right Ear: External ear normal.      Left Ear: External ear normal.      Nose: No congestion or rhinorrhea.      Mouth/Throat:      Mouth: Mucous membranes are moist.   Cardiovascular:      Rate and Rhythm: Normal rate and regular rhythm.   Pulmonary:      Effort: No respiratory distress.      Breath sounds: Normal breath sounds. No wheezing or rales.   Abdominal:      General: There is no distension.      Tenderness: There is no abdominal tenderness. There is no rebound.   Musculoskeletal:      Cervical back: No rigidity or tenderness.      Comments: Bilateral lower extremity amputation   Neurological:      Mental Status: She is disoriented.           Significant Labs: All pertinent labs within the past 24 hours have been reviewed.  Recent Lab Results  (Last 5 results in the past 24 hours)        05/22/23  1202   05/22/23  1200   05/22/23  0757   05/22/23  0522   05/21/23  2131        Anion Gap       14         aPTT 40.8  Comment: Refer to local heparin nomogram for intensity/dose specific    therapeutic   range.         47.1  Comment: Refer to local heparin nomogram for intensity/dose specific   therapeutic   range.           Baso #       0.07         Basophil %       1.0         BUN       92         Calcium       8.2         Chloride       92         CO2       25         Creatinine       7.4         Differential Method       Automated         eGFR       6.0         Eos #       0.3         Eosinophil %       3.6         Glucose       107         Gran # (ANC)       4.7         Gran %       67.2         Hematocrit       28.1         Hemoglobin       8.4         Immature Grans (Abs)       0.02  Comment: Mild elevation in immature granulocytes is non specific and   can be seen in a variety of conditions including stress response,   acute inflammation, trauma and pregnancy. Correlation with other   laboratory and clinical findings is essential.           Immature Granulocytes       0.3         Lymph #       0.9         Lymph %       12.5         Magnesium       1.9         MCH       30.9         MCHC       29.9         MCV       103         Mono #       1.1         Mono %       15.4         MPV       10.7         nRBC       0         Phosphorus       6.4         Platelets       332         POCT Glucose   98   111     113       Potassium       3.5         RBC       2.72         RDW       13.7         Sodium       131         WBC       7.03                                Significant Imaging: I have reviewed all pertinent imaging results/findings within the past 24 hours.

## 2023-05-22 NOTE — PT/OT/SLP PROGRESS
Occupational Therapy   Treatment    Name: Suyapa Connelly  MRN: [id number]  Admitting Diagnosis:  Acute on chronic combined systolic and diastolic heart failure  12 Days Post-Op    Recommendations:     Discharge Recommendations: other (see comments)  Discharge Equipment Recommendations:  drop arm commode, hospital bed, lift device  Barriers to discharge:  Other (Comment) (skilled assistnace required)    Assessment:     Suyapa Connelly is a 57 y.o. female with a medical diagnosis of Acute on chronic combined systolic and diastolic heart failure.  OT plan of care reduced to 2x/ week due to patient's lack of coherence with HEP exercises and EOB activities as reported by patient despite continuous educated on therapeutic benefits. Patient continues to be limited by pain and inability to tolerance touch assistance inhibiting ability to further progress with therapeutic activities. Performance deficits affecting function are weakness, impaired cardiopulmonary response to activity, impaired endurance, pain, edema, impaired self care skills, impaired sensation, decreased safety awareness, impaired skin.     Rehab Prognosis:  Fair; patient would benefit from acute skilled OT services to address these deficits and reach maximum level of function.       Plan:     Patient to be seen 2 x/week to address the above listed problems via self-care/home management, therapeutic activities, therapeutic exercises, neuromuscular re-education, wheelchair management/training  Plan of Care Expires: 06/07/23  Plan of Care Reviewed with: patient    Subjective     Chief Complaint: patient expressed desire to brush teeth several times while all oral hygiene supplies in front of patient; nursing notified of potential altered mental status   Patient/Family Comments/goals: get better and go home  Pain/Comfort:  Pain Rating 1: 0/10    Objective:     Communicated with: nursing prior to session.  Patient found HOB elevated with telemetrygwendolyn  "catheter, peripheral IV upon OT entry to room.    General Precautions: Standard, fall    Orthopedic Precautions:N/A  Braces: N/A  Respiratory Status: Room air     Occupational Performance:     Bed Mobility:    Patient completed Rolling/Turning to Left with  minimum assistance  Patient completed Scooting/Bridging with stand by assistance  Patient completed Supine to Sit with minimum assistance and HOB raised  Patient completed Sit to Supine with contact guard assistance   Patient sat EOB for ~15 minutes with initial grasping on hand rails, but able to reduce grasp on rails. Overall stand by assistance for dynamic and static sitting balance.     Functional Mobility/Transfers: not attempted on this date due to functional status of patient    Activities of Daily Living:  Grooming: stand by assistance to brush teeth while sitting EOB    Therapeutic Activities:   Patient reports she has not completed triceps extension push ups or EOB activities over the weekend   1x5 triceps extension push ups with reduced clearing off of bed    AMPA 6 Click ADL: 15    Treatment & Education:    Patient educated on:   -purpose of OT and OT POC  -facilitation and education on proper body mechanics, energy conservation, and safety  -continuation of HEP exercises as established by consistent OT  -importance of early mobility and EOB activities with staff assist  -overall benefits of therapy       Patient left left sidelying with all lines intact, call button in reach, and nursing notified.   - Nursing notified of potential altered mental status due to several requests for " I want to brush my teeth" despite all necessary items placed in front of patient but A&Ox4.   - Vitals monitored during session due to patient c/o dizziness when sitting on bed. BP: 118/58 (81). After therapeutic actiity, patient returned to side lying with  BP: 95/44 (64). Nursing notified of vitals.     GOALS:   Multidisciplinary Problems       Occupational Therapy " Goals          Problem: Occupational Therapy    Goal Priority Disciplines Outcome Interventions   Occupational Therapy Goal     OT, PT/OT Ongoing, Progressing    Description: Goals to be met by: 5/25     Patient will increase functional independence with ADLs by performing:    UE Dressing with Modified Carter.  LE Dressing with Modified Carter.  Sitting at edge of bed x10 minutes with Stand-by Assistance. - Met  Rolling to Bilateral with Stand-by Assistance.   Supine to sit with Carter.                         Time Tracking:     OT Date of Treatment: 05/22/23  OT Start Time: 1446  OT Stop Time: 1515  OT Total Time (min): 29 min    Billable Minutes:Therapeutic Activity 29    OT/MACKENZIE: OT          5/22/2023

## 2023-05-22 NOTE — PLAN OF CARE
Continuing to work on plan of care goals at this time. Reviewed with patient. Monitoring blood sugar ACHS. Pt to receive tunneled HD cath 5/23, NPO at midnight for procedure.

## 2023-05-23 PROBLEM — J96.01 ACUTE HYPOXEMIC RESPIRATORY FAILURE: Status: RESOLVED | Noted: 2023-05-01 | Resolved: 2023-05-23

## 2023-05-23 LAB
ANION GAP SERPL CALC-SCNC: 16 MMOL/L (ref 8–16)
ANION GAP SERPL CALC-SCNC: 17 MMOL/L (ref 8–16)
APTT PPP: 30.8 SEC (ref 21–32)
BASOPHILS # BLD AUTO: 0.05 K/UL (ref 0–0.2)
BASOPHILS # BLD AUTO: 0.07 K/UL (ref 0–0.2)
BASOPHILS NFR BLD: 0.8 % (ref 0–1.9)
BASOPHILS NFR BLD: 1 % (ref 0–1.9)
BUN SERPL-MCNC: 94 MG/DL (ref 6–20)
BUN SERPL-MCNC: 94 MG/DL (ref 6–20)
CALCIUM SERPL-MCNC: 9 MG/DL (ref 8.7–10.5)
CALCIUM SERPL-MCNC: 9.2 MG/DL (ref 8.7–10.5)
CHLORIDE SERPL-SCNC: 91 MMOL/L (ref 95–110)
CHLORIDE SERPL-SCNC: 93 MMOL/L (ref 95–110)
CO2 SERPL-SCNC: 24 MMOL/L (ref 23–29)
CO2 SERPL-SCNC: 26 MMOL/L (ref 23–29)
CREAT SERPL-MCNC: 7.6 MG/DL (ref 0.5–1.4)
CREAT SERPL-MCNC: 7.9 MG/DL (ref 0.5–1.4)
DIFFERENTIAL METHOD: ABNORMAL
DIFFERENTIAL METHOD: ABNORMAL
EOSINOPHIL # BLD AUTO: 0.1 K/UL (ref 0–0.5)
EOSINOPHIL # BLD AUTO: 0.2 K/UL (ref 0–0.5)
EOSINOPHIL NFR BLD: 2.1 % (ref 0–8)
EOSINOPHIL NFR BLD: 3.3 % (ref 0–8)
ERYTHROCYTE [DISTWIDTH] IN BLOOD BY AUTOMATED COUNT: 13.6 % (ref 11.5–14.5)
ERYTHROCYTE [DISTWIDTH] IN BLOOD BY AUTOMATED COUNT: 13.7 % (ref 11.5–14.5)
EST. GFR  (NO RACE VARIABLE): 5.5 ML/MIN/1.73 M^2
EST. GFR  (NO RACE VARIABLE): 5.8 ML/MIN/1.73 M^2
GLUCOSE SERPL-MCNC: 75 MG/DL (ref 70–110)
GLUCOSE SERPL-MCNC: 83 MG/DL (ref 70–110)
HCT VFR BLD AUTO: 28 % (ref 37–48.5)
HCT VFR BLD AUTO: 28.2 % (ref 37–48.5)
HGB BLD-MCNC: 8.4 G/DL (ref 12–16)
HGB BLD-MCNC: 8.5 G/DL (ref 12–16)
IMM GRANULOCYTES # BLD AUTO: 0.01 K/UL (ref 0–0.04)
IMM GRANULOCYTES # BLD AUTO: 0.02 K/UL (ref 0–0.04)
IMM GRANULOCYTES NFR BLD AUTO: 0.2 % (ref 0–0.5)
IMM GRANULOCYTES NFR BLD AUTO: 0.3 % (ref 0–0.5)
INR PPP: 1.1 (ref 0.8–1.2)
LYMPHOCYTES # BLD AUTO: 0.8 K/UL (ref 1–4.8)
LYMPHOCYTES # BLD AUTO: 1.1 K/UL (ref 1–4.8)
LYMPHOCYTES NFR BLD: 11.6 % (ref 18–48)
LYMPHOCYTES NFR BLD: 17.4 % (ref 18–48)
MAGNESIUM SERPL-MCNC: 2 MG/DL (ref 1.6–2.6)
MAGNESIUM SERPL-MCNC: 2.1 MG/DL (ref 1.6–2.6)
MCH RBC QN AUTO: 30.4 PG (ref 27–31)
MCH RBC QN AUTO: 30.5 PG (ref 27–31)
MCHC RBC AUTO-ENTMCNC: 30 G/DL (ref 32–36)
MCHC RBC AUTO-ENTMCNC: 30.1 G/DL (ref 32–36)
MCV RBC AUTO: 101 FL (ref 82–98)
MCV RBC AUTO: 101 FL (ref 82–98)
MONOCYTES # BLD AUTO: 0.8 K/UL (ref 0.3–1)
MONOCYTES # BLD AUTO: 0.9 K/UL (ref 0.3–1)
MONOCYTES NFR BLD: 12.5 % (ref 4–15)
MONOCYTES NFR BLD: 13.4 % (ref 4–15)
NEUTROPHILS # BLD AUTO: 4 K/UL (ref 1.8–7.7)
NEUTROPHILS # BLD AUTO: 4.8 K/UL (ref 1.8–7.7)
NEUTROPHILS NFR BLD: 65.8 % (ref 38–73)
NEUTROPHILS NFR BLD: 71.6 % (ref 38–73)
NRBC BLD-RTO: 0 /100 WBC
NRBC BLD-RTO: 0 /100 WBC
PHOSPHATE SERPL-MCNC: 6 MG/DL (ref 2.7–4.5)
PHOSPHATE SERPL-MCNC: 6.1 MG/DL (ref 2.7–4.5)
PLATELET # BLD AUTO: 279 K/UL (ref 150–450)
PLATELET # BLD AUTO: 293 K/UL (ref 150–450)
PMV BLD AUTO: 10.8 FL (ref 9.2–12.9)
PMV BLD AUTO: 11 FL (ref 9.2–12.9)
POCT GLUCOSE: 73 MG/DL (ref 70–110)
POCT GLUCOSE: 77 MG/DL (ref 70–110)
POCT GLUCOSE: 92 MG/DL (ref 70–110)
POTASSIUM SERPL-SCNC: 4.2 MMOL/L (ref 3.5–5.1)
POTASSIUM SERPL-SCNC: 4.4 MMOL/L (ref 3.5–5.1)
PROTHROMBIN TIME: 11.9 SEC (ref 9–12.5)
RBC # BLD AUTO: 2.76 M/UL (ref 4–5.4)
RBC # BLD AUTO: 2.79 M/UL (ref 4–5.4)
SODIUM SERPL-SCNC: 133 MMOL/L (ref 136–145)
SODIUM SERPL-SCNC: 134 MMOL/L (ref 136–145)
WBC # BLD AUTO: 6.09 K/UL (ref 3.9–12.7)
WBC # BLD AUTO: 6.7 K/UL (ref 3.9–12.7)

## 2023-05-23 PROCEDURE — 99233 PR SUBSEQUENT HOSPITAL CARE,LEVL III: ICD-10-PCS | Mod: ,,, | Performed by: INTERNAL MEDICINE

## 2023-05-23 PROCEDURE — 36415 COLL VENOUS BLD VENIPUNCTURE: CPT

## 2023-05-23 PROCEDURE — 25000003 PHARM REV CODE 250: Performed by: INTERNAL MEDICINE

## 2023-05-23 PROCEDURE — 99499 UNLISTED E&M SERVICE: CPT | Mod: ,,, | Performed by: INTERNAL MEDICINE

## 2023-05-23 PROCEDURE — 99233 SBSQ HOSP IP/OBS HIGH 50: CPT | Mod: ,,, | Performed by: INTERNAL MEDICINE

## 2023-05-23 PROCEDURE — 94761 N-INVAS EAR/PLS OXIMETRY MLT: CPT

## 2023-05-23 PROCEDURE — 25000003 PHARM REV CODE 250: Performed by: STUDENT IN AN ORGANIZED HEALTH CARE EDUCATION/TRAINING PROGRAM

## 2023-05-23 PROCEDURE — 85025 COMPLETE CBC W/AUTO DIFF WBC: CPT | Performed by: HOSPITALIST

## 2023-05-23 PROCEDURE — 25000003 PHARM REV CODE 250: Performed by: HOSPITALIST

## 2023-05-23 PROCEDURE — 20600001 HC STEP DOWN PRIVATE ROOM

## 2023-05-23 PROCEDURE — 36415 COLL VENOUS BLD VENIPUNCTURE: CPT | Performed by: STUDENT IN AN ORGANIZED HEALTH CARE EDUCATION/TRAINING PROGRAM

## 2023-05-23 PROCEDURE — 83735 ASSAY OF MAGNESIUM: CPT | Performed by: STUDENT IN AN ORGANIZED HEALTH CARE EDUCATION/TRAINING PROGRAM

## 2023-05-23 PROCEDURE — 85610 PROTHROMBIN TIME: CPT

## 2023-05-23 PROCEDURE — 25000003 PHARM REV CODE 250

## 2023-05-23 PROCEDURE — 84100 ASSAY OF PHOSPHORUS: CPT | Mod: 91 | Performed by: STUDENT IN AN ORGANIZED HEALTH CARE EDUCATION/TRAINING PROGRAM

## 2023-05-23 PROCEDURE — 85730 THROMBOPLASTIN TIME PARTIAL: CPT

## 2023-05-23 PROCEDURE — 85025 COMPLETE CBC W/AUTO DIFF WBC: CPT | Mod: 91

## 2023-05-23 PROCEDURE — 99499 NO LOS: ICD-10-PCS | Mod: ,,, | Performed by: INTERNAL MEDICINE

## 2023-05-23 PROCEDURE — 99233 SBSQ HOSP IP/OBS HIGH 50: CPT | Mod: GC,,, | Performed by: STUDENT IN AN ORGANIZED HEALTH CARE EDUCATION/TRAINING PROGRAM

## 2023-05-23 PROCEDURE — 99233 PR SUBSEQUENT HOSPITAL CARE,LEVL III: ICD-10-PCS | Mod: GC,,, | Performed by: STUDENT IN AN ORGANIZED HEALTH CARE EDUCATION/TRAINING PROGRAM

## 2023-05-23 PROCEDURE — 36415 COLL VENOUS BLD VENIPUNCTURE: CPT | Performed by: HOSPITALIST

## 2023-05-23 PROCEDURE — 97110 THERAPEUTIC EXERCISES: CPT | Mod: CQ

## 2023-05-23 PROCEDURE — 80048 BASIC METABOLIC PNL TOTAL CA: CPT | Performed by: STUDENT IN AN ORGANIZED HEALTH CARE EDUCATION/TRAINING PROGRAM

## 2023-05-23 RX ORDER — AMITRIPTYLINE HYDROCHLORIDE 50 MG/1
50 TABLET, FILM COATED ORAL NIGHTLY
Qty: 30 TABLET | Refills: 11 | Status: SHIPPED | OUTPATIENT
Start: 2023-05-23 | End: 2023-05-25 | Stop reason: HOSPADM

## 2023-05-23 RX ORDER — HEPARIN SODIUM 1000 [USP'U]/ML
1000 INJECTION, SOLUTION INTRAVENOUS; SUBCUTANEOUS
Status: DISCONTINUED | OUTPATIENT
Start: 2023-05-23 | End: 2023-05-29 | Stop reason: HOSPADM

## 2023-05-23 RX ORDER — ALLOPURINOL 100 MG/1
50 TABLET ORAL EVERY OTHER DAY
Qty: 8 TABLET | Refills: 11 | Status: SHIPPED | OUTPATIENT
Start: 2023-05-25 | End: 2023-12-26 | Stop reason: SDUPTHER

## 2023-05-23 RX ORDER — CARVEDILOL 6.25 MG/1
6.25 TABLET ORAL 2 TIMES DAILY
Qty: 60 TABLET | Refills: 11 | Status: SHIPPED | OUTPATIENT
Start: 2023-05-23 | End: 2023-12-26 | Stop reason: SDUPTHER

## 2023-05-23 RX ORDER — CALCIUM ACETATE 667 MG/1
1334 CAPSULE ORAL
Qty: 180 CAPSULE | Refills: 11 | Status: SHIPPED | OUTPATIENT
Start: 2023-05-23 | End: 2023-05-29 | Stop reason: SDUPTHER

## 2023-05-23 RX ORDER — ISOSORBIDE DINITRATE 20 MG/1
40 TABLET ORAL 3 TIMES DAILY
Qty: 180 TABLET | Refills: 11 | Status: SHIPPED | OUTPATIENT
Start: 2023-05-23 | End: 2023-12-26 | Stop reason: SDUPTHER

## 2023-05-23 RX ORDER — TORSEMIDE 20 MG/1
20 TABLET ORAL DAILY
Qty: 30 TABLET | Refills: 11 | Status: SHIPPED | OUTPATIENT
Start: 2023-05-23 | End: 2023-05-26 | Stop reason: HOSPADM

## 2023-05-23 RX ORDER — HYDRALAZINE HYDROCHLORIDE 25 MG/1
75 TABLET, FILM COATED ORAL 3 TIMES DAILY
Qty: 270 TABLET | Refills: 11 | Status: SHIPPED | OUTPATIENT
Start: 2023-05-23 | End: 2023-05-29 | Stop reason: HOSPADM

## 2023-05-23 RX ORDER — CALCITRIOL 0.5 UG/1
0.5 CAPSULE ORAL DAILY
Qty: 30 CAPSULE | Refills: 11 | Status: SHIPPED | OUTPATIENT
Start: 2023-05-24 | End: 2023-12-26 | Stop reason: SDUPTHER

## 2023-05-23 RX ORDER — HEPARIN SODIUM,PORCINE/D5W 25000/250
0-40 INTRAVENOUS SOLUTION INTRAVENOUS CONTINUOUS
Status: DISCONTINUED | OUTPATIENT
Start: 2023-05-23 | End: 2023-05-25

## 2023-05-23 RX ORDER — SEVELAMER CARBONATE 800 MG/1
800 TABLET, FILM COATED ORAL
Qty: 90 TABLET | Refills: 11 | Status: SHIPPED | OUTPATIENT
Start: 2023-05-23 | End: 2023-05-23 | Stop reason: HOSPADM

## 2023-05-23 RX ORDER — FAMOTIDINE 20 MG/1
20 TABLET, FILM COATED ORAL DAILY
Qty: 30 TABLET | Refills: 11 | Status: SHIPPED | OUTPATIENT
Start: 2023-05-24 | End: 2023-12-02

## 2023-05-23 RX ORDER — SODIUM CHLORIDE 9 MG/ML
INJECTION, SOLUTION INTRAVENOUS ONCE
Status: COMPLETED | OUTPATIENT
Start: 2023-05-23 | End: 2023-05-23

## 2023-05-23 RX ORDER — TORSEMIDE 20 MG/1
20 TABLET ORAL DAILY
Status: DISCONTINUED | OUTPATIENT
Start: 2023-05-23 | End: 2023-05-26

## 2023-05-23 RX ADMIN — ISOSORBIDE DINITRATE 40 MG: 20 TABLET ORAL at 09:05

## 2023-05-23 RX ADMIN — CALCIUM ACETATE 1334 MG: 667 CAPSULE ORAL at 09:05

## 2023-05-23 RX ADMIN — HYDRALAZINE HYDROCHLORIDE 75 MG: 50 TABLET ORAL at 09:05

## 2023-05-23 RX ADMIN — ISOSORBIDE DINITRATE 40 MG: 20 TABLET ORAL at 10:05

## 2023-05-23 RX ADMIN — SERTRALINE HYDROCHLORIDE 50 MG: 50 TABLET ORAL at 09:05

## 2023-05-23 RX ADMIN — ATORVASTATIN CALCIUM 80 MG: 40 TABLET, FILM COATED ORAL at 09:05

## 2023-05-23 RX ADMIN — CALCITRIOL CAPSULES 0.25 MCG 0.5 MCG: 0.25 CAPSULE ORAL at 09:05

## 2023-05-23 RX ADMIN — HYDROMORPHONE HYDROCHLORIDE 2 MG: 2 TABLET ORAL at 10:05

## 2023-05-23 RX ADMIN — CLOPIDOGREL BISULFATE 75 MG: 75 TABLET ORAL at 09:05

## 2023-05-23 RX ADMIN — AMITRIPTYLINE HYDROCHLORIDE 50 MG: 25 TABLET, FILM COATED ORAL at 09:05

## 2023-05-23 RX ADMIN — APIXABAN 5 MG: 5 TABLET, FILM COATED ORAL at 09:05

## 2023-05-23 RX ADMIN — HYDRALAZINE HYDROCHLORIDE 75 MG: 50 TABLET ORAL at 11:05

## 2023-05-23 RX ADMIN — SODIUM CHLORIDE: 9 INJECTION, SOLUTION INTRAVENOUS at 07:05

## 2023-05-23 RX ADMIN — SEVELAMER CARBONATE 800 MG: 800 TABLET, FILM COATED ORAL at 09:05

## 2023-05-23 RX ADMIN — CARVEDILOL 6.25 MG: 6.25 TABLET, FILM COATED ORAL at 09:05

## 2023-05-23 RX ADMIN — ALLOPURINOL 50 MG: 300 TABLET ORAL at 09:05

## 2023-05-23 RX ADMIN — TORSEMIDE 20 MG: 20 TABLET ORAL at 10:05

## 2023-05-23 RX ADMIN — CARVEDILOL 6.25 MG: 6.25 TABLET, FILM COATED ORAL at 10:05

## 2023-05-23 RX ADMIN — FAMOTIDINE 20 MG: 20 TABLET ORAL at 09:05

## 2023-05-23 NOTE — SUBJECTIVE & OBJECTIVE
Interval History: Seen and evaluated by bedside. Making urine. Confused but noted to be at baseline per patient . Scr stable currently. No severe electrolyte abnormalities on most recent labs. Scheduled for perm cath placement tomorrow with IR. Plan for HD after.   Review of patient's allergies indicates:   Allergen Reactions    Ciprofloxacin Itching    Contrast media      Kidney injury    Iodine      Kidney injury     Current Facility-Administered Medications   Medication Frequency    acetaminophen tablet 1,000 mg Q8H PRN    allopurinol split tablet 50 mg Every other day    amitriptyline tablet 50 mg QHS    apixaban tablet 5 mg BID    atorvastatin tablet 80 mg Daily    calcitRIOL capsule 0.5 mcg Daily    calcium acetate(phosphat bind) capsule 1,334 mg TID WM    carvediloL tablet 6.25 mg BID    clopidogreL tablet 75 mg Daily    dextrose 10% bolus 125 mL 125 mL PRN    dextrose 10% bolus 250 mL 250 mL PRN    dextrose 40 % gel 15,000 mg PRN    dextrose 40 % gel 30,000 mg PRN    famotidine tablet 20 mg Daily    glucagon (human recombinant) injection 1 mg PRN    hydrALAZINE tablet 75 mg TID    HYDROmorphone tablet 2 mg BID PRN    insulin aspart U-100 pen 0-5 Units QID (AC + HS) PRN    insulin detemir U-100 (Levemir) pen 8 Units QHS    isosorbide dinitrate tablet 40 mg TID    melatonin tablet 6 mg Nightly PRN    naloxone 0.4 mg/mL injection 0.02 mg PRN    ondansetron disintegrating tablet 8 mg Q8H PRN    sertraline tablet 50 mg Daily    sevelamer carbonate tablet 800 mg TID WM    sodium chloride 0.9% flush 10 mL Q12H PRN    sodium chloride 0.9% flush 10 mL PRN    sodium chloride 0.9% flush 10 mL PRN       Objective:     Vital Signs (Most Recent):  Temp: 98 °F (36.7 °C) (05/23/23 0507)  Pulse: 83 (05/23/23 0507)  Resp: 18 (05/23/23 0507)  BP: (!) 105/52 (05/23/23 0507)  SpO2: 99 % (05/23/23 0507) Vital Signs (24h Range):  Temp:  [98 °F (36.7 °C)-98.2 °F (36.8 °C)] 98 °F (36.7 °C)  Pulse:  [79-86] 83  Resp:  [16-19]  18  SpO2:  [94 %-99 %] 99 %  BP: (101-115)/(52-67) 105/52     Weight:  (bed scale not working) (05/23/23 0518)  Body mass index is 35.88 kg/m².  Body surface area is 2.12 meters squared.    I/O last 3 completed shifts:  In: 222 [P.O.:222]  Out: 1200 [Urine:1200]     Physical Exam  Vitals and nursing note reviewed.   Constitutional:       General: She is not in acute distress.     Appearance: Normal appearance. She is obese. She is not ill-appearing or diaphoretic.   HENT:      Head: Normocephalic and atraumatic.      Right Ear: External ear normal.      Left Ear: External ear normal.      Nose: Nose normal.      Mouth/Throat:      Mouth: Mucous membranes are moist.      Pharynx: Oropharynx is clear.   Eyes:      General: No scleral icterus.     Extraocular Movements: Extraocular movements intact.      Conjunctiva/sclera: Conjunctivae normal.      Pupils: Pupils are equal, round, and reactive to light.   Cardiovascular:      Rate and Rhythm: Normal rate and regular rhythm.      Pulses: Normal pulses.      Heart sounds: Normal heart sounds. No murmur heard.  Pulmonary:      Effort: Pulmonary effort is normal. No respiratory distress.      Breath sounds: Normal breath sounds. No stridor. No wheezing or rhonchi.   Abdominal:      General: Abdomen is flat. There is no distension.      Palpations: Abdomen is soft.      Tenderness: There is no abdominal tenderness. There is no guarding.   Musculoskeletal:         General: Swelling present. No tenderness. Normal range of motion.      Cervical back: Normal range of motion and neck supple. No rigidity or tenderness.      Right lower leg: Edema present.      Left lower leg: Edema present.      Comments: Trigger finger R middle finger  BL AKA stump edema +2  L thigh tenderness with palpation    Skin:     General: Skin is warm and dry.      Capillary Refill: Capillary refill takes less than 2 seconds.      Coloration: Skin is not jaundiced.      Findings: No erythema.    Neurological:      General: No focal deficit present.      Mental Status: She is alert and oriented to person, place, and time.   Psychiatric:         Mood and Affect: Mood normal.         Behavior: Behavior normal.        Significant Labs:  CBC:   Recent Labs   Lab 05/23/23  0557   WBC 6.70   RBC 2.76*   HGB 8.4*   HCT 28.0*      *   MCH 30.4   MCHC 30.0*       CMP:   Recent Labs   Lab 05/18/23  0428 05/18/23  1835 05/23/23  0557      < > 83   CALCIUM 7.4*   < > 9.0   ALBUMIN 2.3*  --   --    PROT 7.2  --   --    *   < > 133*   K 4.4   < > 4.2   CO2 25   < > 26   CL 93*   < > 91*   BUN 80*   < > 94*   CREATININE 6.7*   < > 7.9*   ALKPHOS 181*  --   --    ALT 6*  --   --    AST 25  --   --    BILITOT 0.3  --   --     < > = values in this interval not displayed.

## 2023-05-23 NOTE — PLAN OF CARE
EDUARD informed by DEANGELO Moya Cie that pt's mother at bedside expressed concerns about pt's  ability to care for pt in the home.  EDUARD gave Nita Moya the number for adult protective services to give to pt's mother should she feel that she needs to file a report.      Jenna Bonilla LMSW  [facility]  [phone]

## 2023-05-23 NOTE — PLAN OF CARE
[facility] - Cardiology Stepdown  Discharge Reassessment    Primary Care Provider: Magen Christensen MD    Expected Discharge Date: 5/25/2023    Reassessment (most recent)       Discharge Reassessment - 05/23/23 1407          Discharge Reassessment    Assessment Type Discharge Planning Reassessment     Discharge Plan discussed with: Spouse/sig other;Parent(s)     Discharge Plan A Home Health;Home with family     Discharge Plan B Home Health     DME Needed Upon Discharge  hospital bed;lift device     Transition of Care Barriers None     Why the patient remains in the hospital Requires continued medical care        Post-Acute Status    Post-Acute Authorization Home Health     Home Health Status Pending medical clearance/testing                 Pt accepted by [facility] when medically cleared.    EDUARD met with pt's  and mother at bedside (pt was sleeping) and informed them of the above and also that we are working on getting pt the hospital bed and jany lift.  Pt's  inquired about getting an electric wheelchair before pt discharges but (after consulting with  leadership) SW informed  that electric wheelchairs are not ordered from the inpatient side.  EDUARD advised  to contact Mr Wheelchair which  stated he would do.      EDUARD will continue to follow.      Jenna Bonilla LMSW  [facility]  [phone]

## 2023-05-23 NOTE — SUBJECTIVE & OBJECTIVE
Interval History: Patient with worsening mental status, asterixis present, Will get line placement tomorrow.     Review of Systems   Unable to perform ROS: Mental status change   Objective:     Vital Signs (Most Recent):  Temp: 98 °F (36.7 °C) (05/23/23 1153)  Pulse: 77 (05/23/23 1505)  Resp: 14 (05/23/23 1153)  BP: 130/65 (05/23/23 1153)  SpO2: 96 % (05/23/23 1153) Vital Signs (24h Range):  Temp:  [98 °F (36.7 °C)-98.3 °F (36.8 °C)] 98 °F (36.7 °C)  Pulse:  [77-86] 77  Resp:  [14-19] 14  SpO2:  [95 %-99 %] 96 %  BP: (101-130)/(52-67) 130/65     Weight:  (bed scale not working)  Body mass index is 35.88 kg/m².    Intake/Output Summary (Last 24 hours) at 5/23/2023 1548  Last data filed at 5/23/2023 1151  Gross per 24 hour   Intake 462 ml   Output 700 ml   Net -238 ml         Physical Exam  Constitutional:       General: She is not in acute distress.  HENT:      Right Ear: External ear normal.      Left Ear: External ear normal.      Nose: No congestion or rhinorrhea.      Mouth/Throat:      Mouth: Mucous membranes are moist.   Cardiovascular:      Rate and Rhythm: Normal rate and regular rhythm.      Pulses: Normal pulses.      Heart sounds: Normal heart sounds.   Pulmonary:      Effort: No respiratory distress.      Breath sounds: Normal breath sounds. No wheezing or rales.   Abdominal:      General: There is no distension.      Tenderness: There is no abdominal tenderness. There is no rebound.   Musculoskeletal:      Cervical back: No rigidity or tenderness.      Right lower leg: Edema present.      Left lower leg: Edema present.      Comments: Bilateral lower extremity amputation, pitting edema present   Neurological:      Mental Status: She is disoriented.      Comments: Asterixis present           Significant Labs: All pertinent labs within the past 24 hours have been reviewed.  Recent Lab Results  (Last 5 results in the past 24 hours)        05/23/23  1250   05/23/23  0908   05/23/23  0557   05/22/23  1917    05/22/23  1857        Anion Gap     16           aPTT         43.0  Comment: Refer to local heparin nomogram for intensity/dose specific   therapeutic   range.         Baso #     0.07           Basophil %     1.0           BUN     94           Calcium     9.0           Chloride     91           CO2     26           Creatinine     7.9           Differential Method     Automated           eGFR     5.5           Eos #     0.1           Eosinophil %     2.1           Glucose     83           Gran # (ANC)     4.8           Gran %     71.6           Hematocrit     28.0           Hemoglobin     8.4           Immature Grans (Abs)     0.02  Comment: Mild elevation in immature granulocytes is non specific and   can be seen in a variety of conditions including stress response,   acute inflammation, trauma and pregnancy. Correlation with other   laboratory and clinical findings is essential.             Immature Granulocytes     0.3           Lymph #     0.8           Lymph %     11.6           Magnesium     2.0           MCH     30.4           MCHC     30.0           MCV     101           Mono #     0.9           Mono %     13.4           MPV     11.0           nRBC     0           Phosphorus     6.1           Platelets     293           POCT Glucose 92   77     113         Potassium     4.2           RBC     2.76           RDW     13.6           Sodium     133           WBC     6.70                                  Significant Imaging: I have reviewed all pertinent imaging results/findings within the past 24 hours.

## 2023-05-23 NOTE — PROGRESS NOTES
[facility] - Cardiology [facility] Medicine  Progress Note    Patient Name: Suyapa Connelly  MRN: [id number]  Patient Class: IP- Inpatient   Admission Date: 5/1/2023  Length of Stay: 20 days  Attending Physician: Batsheva Torres DO  Primary Care Provider: Magen Christensen MD        Subjective:     Principal Problem:Acute on chronic combined systolic and diastolic heart failure        HPI:  57 y.o. female with past medical history of CAD s/p CABG, CKD, DM, HTN, HLD, s/p bilateral AKA who presnets with chest pain, difficulty breathing, leg swelling.     Per ED She has been unable to access any of her medications for the last 3 months. Over the last few months she has noted worsening shortness of breath, particulatly orthopnea, and leg swelling. In the last two weeks she endorses associated abdominal pain and swelling, reduced appetite, and pleuritic chest pain with deep inspiration. She endorses occasional lighthededness with changes in position. Last week she had an episode where she was unable to breathe for about two minutes which was very upsetting for her and her , but she recovered and did not seek care at that time. Today, she complains of significant difficulty breathing, swelling in her legs and abdomen, and reduced appetite.     No headaches, visual changes, URI symptoms, palpitations, nausea, vomiting, diarrhea, blood in her stool, dysuria, hematuria, numbness or weakness in her extremities. She does endorse occasional paresthesias associated with her chronic phantom limb pain. She additionally has had increased difficulties with depression recently, denies suicidal or homicidal ideation, but has had considerable stress and low mood.      Of note, the patient saw a new primary care provider 4/21 for similar concerns who recommended she proceed to the ED at that time, but they were unable due to financial and geographic concerns. She lives two hours away from [facility] and her  cares for her and  elderly mother with dementia. She has had decreased access to medical care over the last year, and unable to access most of her prescriptions. She had a 90 day supply of four medications (lasix, statin, clopidogrel, sertraline) but these were accidentally lost and she has been unable to afford a replacement. They present today for evaluation and to reestablish care and access to her prescriptions.     Labs on admission significant for an elevated BNP, hypocalcemia which corrected is 8.3, significant elevation in Creatinine and a slight elevation of troponin    CXR  Cardiomegaly with pulmonary interstitial edema, suggestive of pulmonary edema secondary to CHF.      Overview/Hospital Course:  Diuresis initiated with Lasix IV.  1.2L UOP in the past 24 hours.  Vitals reassuring with adequate sats on ambient air.  Still very edematious; Cardiology recs lasix gtt.  GDMT initiated. Pt given 2mg morphine for moderate to severe pain in legs from swelling. Diuresis increased to 40mg/hr due to inadequate urine output. Cr continued to increase. Cr stable at new Lasix rate. Diuresed 1.9L with some improvement in clinical status. S/p RHC that showed wedge of 26-32 with CVP of 15. Will continue diuresis with Lasix with the addition of metolazone and acetazolamide. Discussions had with nephrology about electrolyte abnormalities. GOC discussions underway.       Interval History: Patient with worsening mental status, asterixis present, Will get line placement tomorrow.     Review of Systems   Unable to perform ROS: Mental status change   Objective:     Vital Signs (Most Recent):  Temp: 98 °F (36.7 °C) (05/23/23 1153)  Pulse: 77 (05/23/23 1505)  Resp: 14 (05/23/23 1153)  BP: 130/65 (05/23/23 1153)  SpO2: 96 % (05/23/23 1153) Vital Signs (24h Range):  Temp:  [98 °F (36.7 °C)-98.3 °F (36.8 °C)] 98 °F (36.7 °C)  Pulse:  [77-86] 77  Resp:  [14-19] 14  SpO2:  [95 %-99 %] 96 %  BP: (101-130)/(52-67) 130/65     Weight:  (bed scale not  working)  Body mass index is 35.88 kg/m².    Intake/Output Summary (Last 24 hours) at 5/23/2023 1548  Last data filed at 5/23/2023 1151  Gross per 24 hour   Intake 462 ml   Output 700 ml   Net -238 ml         Physical Exam  Constitutional:       General: She is not in acute distress.  HENT:      Right Ear: External ear normal.      Left Ear: External ear normal.      Nose: No congestion or rhinorrhea.      Mouth/Throat:      Mouth: Mucous membranes are moist.   Cardiovascular:      Rate and Rhythm: Normal rate and regular rhythm.      Pulses: Normal pulses.      Heart sounds: Normal heart sounds.   Pulmonary:      Effort: No respiratory distress.      Breath sounds: Normal breath sounds. No wheezing or rales.   Abdominal:      General: There is no distension.      Tenderness: There is no abdominal tenderness. There is no rebound.   Musculoskeletal:      Cervical back: No rigidity or tenderness.      Right lower leg: Edema present.      Left lower leg: Edema present.      Comments: Bilateral lower extremity amputation, pitting edema present   Neurological:      Mental Status: She is disoriented.      Comments: Asterixis present           Significant Labs: All pertinent labs within the past 24 hours have been reviewed.  Recent Lab Results  (Last 5 results in the past 24 hours)        05/23/23  1250   05/23/23  0908   05/23/23  0557   05/22/23  1917   05/22/23  1857        Anion Gap     16           aPTT         43.0  Comment: Refer to local heparin nomogram for intensity/dose specific   therapeutic   range.         Baso #     0.07           Basophil %     1.0           BUN     94           Calcium     9.0           Chloride     91           CO2     26           Creatinine     7.9           Differential Method     Automated           eGFR     5.5           Eos #     0.1           Eosinophil %     2.1           Glucose     83           Gran # (ANC)     4.8           Gran %     71.6           Hematocrit     28.0            Hemoglobin     8.4           Immature Grans (Abs)     0.02  Comment: Mild elevation in immature granulocytes is non specific and   can be seen in a variety of conditions including stress response,   acute inflammation, trauma and pregnancy. Correlation with other   laboratory and clinical findings is essential.             Immature Granulocytes     0.3           Lymph #     0.8           Lymph %     11.6           Magnesium     2.0           MCH     30.4           MCHC     30.0           MCV     101           Mono #     0.9           Mono %     13.4           MPV     11.0           nRBC     0           Phosphorus     6.1           Platelets     293           POCT Glucose 92   77     113         Potassium     4.2           RBC     2.76           RDW     13.6           Sodium     133           WBC     6.70                                  Significant Imaging: I have reviewed all pertinent imaging results/findings within the past 24 hours.      Assessment/Plan:      * Acute on chronic combined systolic and diastolic heart failure  Last Echo 6/21 showing    The estimated ejection fraction is 55%.  The left ventricle is normal in size with normal systolic function.  Normal left ventricular diastolic function.  Normal right ventricular size with normal right ventricular systolic function.  Mild tricuspid regurgitation.  The estimated PA systolic pressure is 31 mmHg.  Normal central venous pressure (3 mmHg).    New Echo  The left ventricle is mildly enlarged with severely decreased systolic function. The estimated ejection fraction is 15%.  There is severe left ventricular global hypokinesis.  Normal right ventricular size with low normal right ventricular systolic function.  Grade I left ventricular diastolic dysfunction.  Biatrial enlargement.  Mild-to-moderate mitral regurgitation.  Severe tricuspid regurgitation.  The estimated PA systolic pressure is 43 mmHg.  Elevated central venous pressure (15 mmHg).            RHC  CVP 15  Wedge 25-32    Patient did not tolerate dobutamine, had chest pain. Dobutamine dc'd  Per cardiology the patients significant peripheral vascular disease prohibits advanced options    -Holding diuretics per nephrology  -Lasix converted to oral torsemide  -Metolazone 10 daily oral  -Acetazolamide 500 BID   -BMPs BID  -Consult Interventional nephrology for tunneled line placement  -Antihypertensive. GDMT, Anticoagulation, and diuresis management per Nephrology and Cardiology in a multidisciplinary team  -Qq4 BMPs with electrolyte repletion as needed; goal K>4, Mg>2  - 1.5L fluid restriction, low Na diet  - continue GDMT when possible  -Tylenol 1g up to 3 times a day            Congestive heart failure  See primary problem    Echo    Interpretation Summary  · The left ventricle is mildly enlarged with severely decreased systolic function. The estimated ejection fraction is 15%.  · There is severe left ventricular global hypokinesis.  · Normal right ventricular size with low normal right ventricular systolic function.  · Grade I left ventricular diastolic dysfunction.  · Biatrial enlargement.  · Mild-to-moderate mitral regurgitation.  · Severe tricuspid regurgitation.  · The estimated PA systolic pressure is 43 mmHg.  · Elevated central venous pressure (15 mmHg).    Will continue to hold diuresis per nephro recs    Myalgia  Will continue to treat pain as needed      ROSLYN (acute kidney injury)    ROSLYN on CKD, likely ischemic, CRS and diuresis  Baseline serum creatinine in the 2s, up to 3.9 on admission; up to 7.4 on her most recent labs, peaked around 7.6 over the weekend   Retroperitoneal US with no hydronephrosis    ECHO with severe systolic dysfunction, EF of 15%    RHC with PCWP of 30   UOP of 1.4L over the past 24h while off diuretic; will continue holding diuretics for now   Consider repeating RHC during this admission to assess response to diuresis   No emergent indication for dialysis at this  time but pt remains at high risk of requiring hemodialysis in the near future if persistent deterioration of her renal function    Dose medications to GFR    Avoid nephrotoxic agents as much as possible     CKD (chronic kidney disease), stage IV  History of CKD. See ROSLYN.      Above-knee amputation of right lower extremity  Prior history consistent with exam      Uses self-applied continuous glucose monitoring device  POCT glucose and daily CMPs    -Restart insulin for hyperglycemia      Dermatitis associated with moisture  Large body habitus with dermatitis to folds. Will monitor for infection.    -Zinc based powder  -Topical abx if indicated      Status post above knee amputation, left        Hypoalbuminemia  Pt with poor PO intake reported. Pt eats one meal a day and has associated nausea and vomiting. Will try antiemetics before feeds to improve intake. Will monitor daily intake and calorie replacement.     -Calorie count  -Diabetic/Renal diet  -Antiemetics before meals; Zofran 4 IV PRN  -Monitor Qtc for prolongation with antiemetics      Uremia  Elevating BUN. Worsening insomnia.   Will discuss goals and plan with nephrology.   5/22 encephalopathic      Impaired functional mobility and endurance  PT/OT for bedbound patient      Paroxysmal atrial fibrillation  Continue home rate control  NSR on EKG            Anemia  Asymptomatic. Higher than 8 months ago. No signs of acute bleed at this time. Haptoglobin elevated.    -CBC daily  -Transfuse if <7        S/P CABG x 1  History of. Sternal scar well healed.       CAD (coronary artery disease)  History of CAD with retrosternal chest pain. EKG negative to ST changes. Nonspecific T wave inversions.     -Cardiac tele  -EKG prn  -Bidil per Cards recs  -Hold anticoagulation for line placement; continue heparin    CAROLIN (generalized anxiety disorder)  Continue home anxiety medications      Essential hypertension  Continue home antihypertensives      Hypocalcemia  Ionized  calcium at 0.76. Ca correcting at 8.4.     -Calcium acetate for calcium repletion and for phos binding.       PAD (peripheral artery disease)  History of bilateral AKA.           VTE Risk Mitigation (From admission, onward)           Ordered     heparin 25,000 units in dextrose 5% (100 units/ml) IV bolus from bag - ADDITIONAL PRN BOLUS - 60 units/kg  As needed (PRN)        Question:  Heparin Infusion Adjustment (DO NOT MODIFY ANSWER)  Answer:  [email or web address]\epic\Images\Pharmacy\HeparinInfusions\heparin LOW INTENSITY nomogram for [facility] [id number]    05/23/23 1637     heparin 25,000 units in dextrose 5% (100 units/ml) IV bolus from bag - ADDITIONAL PRN BOLUS - 30 units/kg  As needed (PRN)        Question:  Heparin Infusion Adjustment (DO NOT MODIFY ANSWER)  Answer:  [email or web address]\epic\Images\Pharmacy\HeparinInfusions\heparin LOW INTENSITY nomogram for [facility] [id number].pdf    05/23/23 1637     heparin 25,000 units in dextrose 5% (100 units/ml) IV bolus from bag INITIAL BOLUS  Once        Question:  Heparin Infusion Adjustment (DO NOT MODIFY ANSWER)  Answer:  [email or web address]\epic\Images\Pharmacy\HeparinInfusions\heparin LOW INTENSITY nomogram for [facility] [id number]    05/23/23 1637     heparin 25,000 units in dextrose 5% 250 mL (100 units/mL) infusion LOW INTENSITY nomogram - [facility]  Continuous        Question Answer Comment   Heparin Infusion Adjustment (DO NOT MODIFY ANSWER) \\[email or web address]\epic\Images\Pharmacy\HeparinInfusions\heparin LOW INTENSITY nomogram for [facility] [id number].pdf    Begin at (in units/kg/hr) 12        05/23/23 1637     heparin (porcine) injection 1,000 Units  As needed (PRN)         05/23/23 1436     IP VTE HIGH RISK PATIENT  Once         05/07/23 1547                    Discharge Planning   DEVAN: 5/25/2023     Code Status: Full Code   Is the patient medically ready for discharge?: No    Reason for patient still in hospital (select all that apply): Patient trending condition  Discharge Plan A: Home Health, Home with family    Discharge Delays: None known at this time              Norman Mackey MD  Department of Hospital Medicine   [facility] - Cardiology Stepdown

## 2023-05-23 NOTE — PLAN OF CARE
"   05/23/23 1308   Post-Acute Status   Post-Acute Authorization Home Health;HME   HME Status Referrals Sent   Home Health Status Referrals Sent     HH referral sent to [place] per pt's request.      Per Cecilia with [facility] they do not service [place], so orders for hospital bed and jany lift will need to be sent to outside company.  Orders faxed to [facility]:    Your fax has been successfully sent to [phone] at [phone].  ------------------------------------------------------------  From: [id number]  ------------------------------------------------------------  5/23/2023 12:02:55 PM Transmission Record   Sent to [phone] with remote ID "[phone]        "   Result: (0/339;0/0) Success   Page record: 1 - 27   Elapsed time: 08:18 on channel 51      UPDATE 1:13 PM  SW received call from Elaine with [facility] reporting that their [place] office, which covers [place], does not have a hospital bed or jany lift in stock so they cannot service pt.  Orders faxed to [facility]:    Your fax has been successfully sent to [phone] at [phone].  ------------------------------------------------------------  From: [id number]  ------------------------------------------------------------  5/23/2023 1:12:49 PM Transmission Record   Sent to [phone] with remote ID "[id number]"   Result: (0/339;0/0) Success   Page record: 1 - 27   Elapsed time: 08:40 on channel 0      UPDATE 4:23 PM  SW received call from [employer] reporting that they they no longer supply jany lifts and that their hospital beds are out of stock.  Orders faxed to [employer] ([phone], fx [phone]).      Jenna Bonilla, EDUARD  [facility]  [phone]      "

## 2023-05-23 NOTE — ASSESSMENT & PLAN NOTE
- ROSLYN on CKD, likely ischemic, CRS and diuresis  - Baseline serum creatinine in the 2s, up to 3.9 on admission; up to 7.9 on her most recent labs  - Retroperitoneal US with no hydronephrosis    - ECHO with severe systolic dysfunction, EF of 15%    - RHC with PCWP of 30   - patient with increase confusion, showing some mild signs of uremia.   - discussion held with family who are in agreement with starting patient on dialysis.   Plan:  - obtain perm cath placement  - plan to initiate dialysis inpatient.   -  consult for HD chair placement.   - renally dose all meds  - Avoid nephrotoxic agents as much as possible    - cont torsemide 20 mg QD

## 2023-05-23 NOTE — PT/OT/SLP PROGRESS
Physical Therapy Treatment    Patient Name:  Suyapa Connelly   MRN:  [id number]    Recommendations:     Discharge Recommendations: other (see comments)  Discharge Equipment Recommendations: drop arm commode, hospital bed, lift device  Barriers to discharge:  Decreased functional mobility and fall risk    Assessment:     Suyapa Connelly is a 57 y.o. female admitted with a medical diagnosis of Acute on chronic combined systolic and diastolic heart failure.  She presents with the following impairments/functional limitations: weakness, impaired endurance, impaired functional mobility, impaired balance, decreased lower extremity function. Pt demonstrated fair tolerance and participation with activities this session. Pt required more cues for completing therapeutic exercises and bed mobility. Pt Venecia with bed mobility. Pt would benefit from continued skilled PT intervention to address functional mobility, strength deficits, and goals.    Rehab Prognosis: Fair; patient would benefit from acute skilled PT services to address these deficits and reach maximum level of function.    Recent Surgery: Procedure(s) (LRB):  INSERTION, CATHETER, RIGHT HEART (Right)  Insertion, Catheter, Central Venous, Hemodialysis 13 Days Post-Op    Plan:     During this hospitalization, patient to be seen 3 x/week to address the identified rehab impairments via therapeutic activities, therapeutic exercises, neuromuscular re-education, wheelchair management/training and progress toward the following goals:    Plan of Care Expires:  06/18/23    Subjective     Chief Complaint: soreness  Patient/Family Comments/goals: to go home  Pain/Comfort:  Pain Rating 1: 0/10      Objective:     Communicated with RN prior to session.  Patient found HOB elevated with peripheral IV, snow catheter, telemetry upon PT entry to room.     General Precautions: Standard, fall  Orthopedic Precautions: N/A  Braces: N/A  Respiratory Status: Room air     Functional  Mobility:  Bed Mobility:     Rolling Left:  minimum assistance  Rolling Right: minimum assistance  Supine to Sit: minimum assistance  Sit to Supine: minimum assistance      AM-PAC 6 CLICK MOBILITY  Turning over in bed (including adjusting bedclothes, sheets and blankets)?: 3  Sitting down on and standing up from a chair with arms (e.g., wheelchair, bedside commode, etc.): 1  Moving from lying on back to sitting on the side of the bed?: 3  Moving to and from a bed to a chair (including a wheelchair)?: 1  Need to walk in hospital room?: 1  Climbing 3-5 steps with a railing?: 1  Basic Mobility Total Score: 10       Treatment & Education:  Pt performed Supine bicep curls, punch outs, and overhead press x 15 reps each arm. Pt required frequent cueing to stay on tasks this session. Pt sat EOB ~2' before returning to supine.     Pt educated on:  - Role of PT and POC/goals for therapy   - Safety with mobility and fall risk   - Instructed to call nursing staff for assistance with mobility as needed   -Tips to reduce fall risk  -Supine theraex recommendations    Pt verbalized understanding and expressed no further concerns/questions.       Patient left HOB elevated with all lines intact, call button in reach, and RN notified..    GOALS:   Multidisciplinary Problems       Physical Therapy Goals          Problem: Physical Therapy    Goal Priority Disciplines Outcome Goal Variances Interventions   Physical Therapy Goal     PT, PT/OT Ongoing, Progressing     Description: Goals to be met by: 2023     Patient will increase functional independence with mobility by performin. Supine to sit with Minimal Assistance.  2. Sit to supine with Minimal Assistance.  3. Bed to chair transfer with Minimal Assistance using LRAD.  4. Wheelchair propulsion x50 feet with Minimal Assistance using bilateral upper extremities.  5. Spouse verbalizes and demonstrates understanding on family training with focus on transfers.                           Time Tracking:     PT Received On: 05/23/23  PT Start Time: 1034     PT Stop Time: 1053  PT Total Time (min): 19 min     Billable Minutes: Therapeutic Exercise 19 mins    Treatment Type: Treatment  PT/PTA: PTA     Number of PTA visits since last PT visit: 2     05/23/2023

## 2023-05-24 PROBLEM — M89.9 CHRONIC KIDNEY DISEASE-MINERAL AND BONE DISORDER: Status: ACTIVE | Noted: 2023-05-24

## 2023-05-24 PROBLEM — Z99.2 DEPENDENT ON HEMODIALYSIS: Status: ACTIVE | Noted: 2023-05-24

## 2023-05-24 PROBLEM — N18.9 CHRONIC KIDNEY DISEASE-MINERAL AND BONE DISORDER: Status: ACTIVE | Noted: 2023-05-24

## 2023-05-24 PROBLEM — E83.9 CHRONIC KIDNEY DISEASE-MINERAL AND BONE DISORDER: Status: ACTIVE | Noted: 2023-05-24

## 2023-05-24 LAB
ANION GAP SERPL CALC-SCNC: 14 MMOL/L (ref 8–16)
ANION GAP SERPL CALC-SCNC: 19 MMOL/L (ref 8–16)
APTT PPP: 23.1 SEC (ref 21–32)
BASOPHILS # BLD AUTO: 0.06 K/UL (ref 0–0.2)
BASOPHILS # BLD AUTO: 0.06 K/UL (ref 0–0.2)
BASOPHILS NFR BLD: 0.8 % (ref 0–1.9)
BASOPHILS NFR BLD: 0.8 % (ref 0–1.9)
BUN SERPL-MCNC: 94 MG/DL (ref 6–20)
BUN SERPL-MCNC: 98 MG/DL (ref 6–20)
CALCIUM SERPL-MCNC: 8.8 MG/DL (ref 8.7–10.5)
CALCIUM SERPL-MCNC: 8.8 MG/DL (ref 8.7–10.5)
CHLORIDE SERPL-SCNC: 94 MMOL/L (ref 95–110)
CHLORIDE SERPL-SCNC: 94 MMOL/L (ref 95–110)
CO2 SERPL-SCNC: 21 MMOL/L (ref 23–29)
CO2 SERPL-SCNC: 27 MMOL/L (ref 23–29)
CREAT SERPL-MCNC: 7.7 MG/DL (ref 0.5–1.4)
CREAT SERPL-MCNC: 7.9 MG/DL (ref 0.5–1.4)
DIFFERENTIAL METHOD: ABNORMAL
DIFFERENTIAL METHOD: ABNORMAL
EOSINOPHIL # BLD AUTO: 0.2 K/UL (ref 0–0.5)
EOSINOPHIL # BLD AUTO: 0.2 K/UL (ref 0–0.5)
EOSINOPHIL NFR BLD: 3.4 % (ref 0–8)
EOSINOPHIL NFR BLD: 3.4 % (ref 0–8)
ERYTHROCYTE [DISTWIDTH] IN BLOOD BY AUTOMATED COUNT: 13.9 % (ref 11.5–14.5)
ERYTHROCYTE [DISTWIDTH] IN BLOOD BY AUTOMATED COUNT: 13.9 % (ref 11.5–14.5)
EST. GFR  (NO RACE VARIABLE): 5.5 ML/MIN/1.73 M^2
EST. GFR  (NO RACE VARIABLE): 5.7 ML/MIN/1.73 M^2
GLUCOSE SERPL-MCNC: 71 MG/DL (ref 70–110)
GLUCOSE SERPL-MCNC: 93 MG/DL (ref 70–110)
HCT VFR BLD AUTO: 30.9 % (ref 37–48.5)
HCT VFR BLD AUTO: 30.9 % (ref 37–48.5)
HGB BLD-MCNC: 8.6 G/DL (ref 12–16)
HGB BLD-MCNC: 8.6 G/DL (ref 12–16)
IMM GRANULOCYTES # BLD AUTO: 0.02 K/UL (ref 0–0.04)
IMM GRANULOCYTES # BLD AUTO: 0.02 K/UL (ref 0–0.04)
IMM GRANULOCYTES NFR BLD AUTO: 0.3 % (ref 0–0.5)
IMM GRANULOCYTES NFR BLD AUTO: 0.3 % (ref 0–0.5)
LYMPHOCYTES # BLD AUTO: 0.8 K/UL (ref 1–4.8)
LYMPHOCYTES # BLD AUTO: 0.8 K/UL (ref 1–4.8)
LYMPHOCYTES NFR BLD: 11.6 % (ref 18–48)
LYMPHOCYTES NFR BLD: 11.6 % (ref 18–48)
MAGNESIUM SERPL-MCNC: 2 MG/DL (ref 1.6–2.6)
MAGNESIUM SERPL-MCNC: 2.1 MG/DL (ref 1.6–2.6)
MCH RBC QN AUTO: 30.4 PG (ref 27–31)
MCH RBC QN AUTO: 30.4 PG (ref 27–31)
MCHC RBC AUTO-ENTMCNC: 27.8 G/DL (ref 32–36)
MCHC RBC AUTO-ENTMCNC: 27.8 G/DL (ref 32–36)
MCV RBC AUTO: 109 FL (ref 82–98)
MCV RBC AUTO: 109 FL (ref 82–98)
MONOCYTES # BLD AUTO: 0.9 K/UL (ref 0.3–1)
MONOCYTES # BLD AUTO: 0.9 K/UL (ref 0.3–1)
MONOCYTES NFR BLD: 13.2 % (ref 4–15)
MONOCYTES NFR BLD: 13.2 % (ref 4–15)
NEUTROPHILS # BLD AUTO: 5 K/UL (ref 1.8–7.7)
NEUTROPHILS # BLD AUTO: 5 K/UL (ref 1.8–7.7)
NEUTROPHILS NFR BLD: 70.7 % (ref 38–73)
NEUTROPHILS NFR BLD: 70.7 % (ref 38–73)
NRBC BLD-RTO: 0 /100 WBC
NRBC BLD-RTO: 0 /100 WBC
PHOSPHATE SERPL-MCNC: 5.7 MG/DL (ref 2.7–4.5)
PHOSPHATE SERPL-MCNC: 6.1 MG/DL (ref 2.7–4.5)
PLATELET # BLD AUTO: 242 K/UL (ref 150–450)
PLATELET # BLD AUTO: 242 K/UL (ref 150–450)
PMV BLD AUTO: 11.6 FL (ref 9.2–12.9)
PMV BLD AUTO: 11.6 FL (ref 9.2–12.9)
POCT GLUCOSE: 105 MG/DL (ref 70–110)
POCT GLUCOSE: 109 MG/DL (ref 70–110)
POCT GLUCOSE: 78 MG/DL (ref 70–110)
POCT GLUCOSE: 85 MG/DL (ref 70–110)
POTASSIUM SERPL-SCNC: 4.1 MMOL/L (ref 3.5–5.1)
POTASSIUM SERPL-SCNC: 4.4 MMOL/L (ref 3.5–5.1)
RBC # BLD AUTO: 2.83 M/UL (ref 4–5.4)
RBC # BLD AUTO: 2.83 M/UL (ref 4–5.4)
SODIUM SERPL-SCNC: 134 MMOL/L (ref 136–145)
SODIUM SERPL-SCNC: 135 MMOL/L (ref 136–145)
WBC # BLD AUTO: 7.07 K/UL (ref 3.9–12.7)
WBC # BLD AUTO: 7.07 K/UL (ref 3.9–12.7)

## 2023-05-24 PROCEDURE — 20600001 HC STEP DOWN PRIVATE ROOM

## 2023-05-24 PROCEDURE — 99233 PR SUBSEQUENT HOSPITAL CARE,LEVL III: ICD-10-PCS | Mod: ,,, | Performed by: INTERNAL MEDICINE

## 2023-05-24 PROCEDURE — 25000003 PHARM REV CODE 250: Performed by: HOSPITALIST

## 2023-05-24 PROCEDURE — 25000003 PHARM REV CODE 250: Performed by: INTERNAL MEDICINE

## 2023-05-24 PROCEDURE — 85730 THROMBOPLASTIN TIME PARTIAL: CPT

## 2023-05-24 PROCEDURE — 99223 1ST HOSP IP/OBS HIGH 75: CPT | Mod: ,,,

## 2023-05-24 PROCEDURE — 36415 COLL VENOUS BLD VENIPUNCTURE: CPT | Performed by: STUDENT IN AN ORGANIZED HEALTH CARE EDUCATION/TRAINING PROGRAM

## 2023-05-24 PROCEDURE — 99223 PR INITIAL HOSPITAL CARE,LEVL III: ICD-10-PCS | Mod: ,,,

## 2023-05-24 PROCEDURE — 99233 SBSQ HOSP IP/OBS HIGH 50: CPT | Mod: GC,,, | Performed by: STUDENT IN AN ORGANIZED HEALTH CARE EDUCATION/TRAINING PROGRAM

## 2023-05-24 PROCEDURE — 36415 COLL VENOUS BLD VENIPUNCTURE: CPT

## 2023-05-24 PROCEDURE — 85025 COMPLETE CBC W/AUTO DIFF WBC: CPT | Performed by: HOSPITALIST

## 2023-05-24 PROCEDURE — 80048 BASIC METABOLIC PNL TOTAL CA: CPT | Mod: 91 | Performed by: STUDENT IN AN ORGANIZED HEALTH CARE EDUCATION/TRAINING PROGRAM

## 2023-05-24 PROCEDURE — 25000003 PHARM REV CODE 250

## 2023-05-24 PROCEDURE — 25000003 PHARM REV CODE 250: Performed by: STUDENT IN AN ORGANIZED HEALTH CARE EDUCATION/TRAINING PROGRAM

## 2023-05-24 PROCEDURE — 99233 PR SUBSEQUENT HOSPITAL CARE,LEVL III: ICD-10-PCS | Mod: GC,,, | Performed by: STUDENT IN AN ORGANIZED HEALTH CARE EDUCATION/TRAINING PROGRAM

## 2023-05-24 PROCEDURE — 84100 ASSAY OF PHOSPHORUS: CPT | Performed by: STUDENT IN AN ORGANIZED HEALTH CARE EDUCATION/TRAINING PROGRAM

## 2023-05-24 PROCEDURE — 83735 ASSAY OF MAGNESIUM: CPT | Performed by: STUDENT IN AN ORGANIZED HEALTH CARE EDUCATION/TRAINING PROGRAM

## 2023-05-24 PROCEDURE — 99233 SBSQ HOSP IP/OBS HIGH 50: CPT | Mod: ,,, | Performed by: INTERNAL MEDICINE

## 2023-05-24 RX ADMIN — Medication 6 MG: at 09:05

## 2023-05-24 RX ADMIN — CALCITRIOL CAPSULES 0.25 MCG 0.5 MCG: 0.25 CAPSULE ORAL at 09:05

## 2023-05-24 RX ADMIN — ATORVASTATIN CALCIUM 80 MG: 40 TABLET, FILM COATED ORAL at 09:05

## 2023-05-24 RX ADMIN — HYDRALAZINE HYDROCHLORIDE 75 MG: 50 TABLET ORAL at 09:05

## 2023-05-24 RX ADMIN — ISOSORBIDE DINITRATE 40 MG: 20 TABLET ORAL at 09:05

## 2023-05-24 RX ADMIN — CALCIUM ACETATE 1334 MG: 667 CAPSULE ORAL at 06:05

## 2023-05-24 RX ADMIN — SEVELAMER CARBONATE 800 MG: 800 TABLET, FILM COATED ORAL at 06:05

## 2023-05-24 RX ADMIN — CARVEDILOL 6.25 MG: 6.25 TABLET, FILM COATED ORAL at 09:05

## 2023-05-24 RX ADMIN — ISOSORBIDE DINITRATE 40 MG: 20 TABLET ORAL at 06:05

## 2023-05-24 RX ADMIN — SERTRALINE HYDROCHLORIDE 50 MG: 50 TABLET ORAL at 09:05

## 2023-05-24 RX ADMIN — FAMOTIDINE 20 MG: 20 TABLET ORAL at 09:05

## 2023-05-24 RX ADMIN — TORSEMIDE 20 MG: 20 TABLET ORAL at 09:05

## 2023-05-24 RX ADMIN — AMITRIPTYLINE HYDROCHLORIDE 50 MG: 25 TABLET, FILM COATED ORAL at 09:05

## 2023-05-24 NOTE — ASSESSMENT & PLAN NOTE
History of CAD with retrosternal chest pain. EKG negative to ST changes. Nonspecific T wave inversions.     -Cardiac tele  -EKG prn  -Bidil per Cards recs  -Hold plavix for line placement; continue heparin

## 2023-05-24 NOTE — PLAN OF CARE
Problem: Adult Inpatient Plan of Care  Goal: Plan of Care Review  Outcome: Ongoing, Progressing  Goal: Patient-Specific Goal (Individualized)  Outcome: Ongoing, Progressing  Goal: Absence of Hospital-Acquired Illness or Injury  Outcome: Ongoing, Progressing  Goal: Optimal Comfort and Wellbeing  Outcome: Ongoing, Progressing  Goal: Readiness for Transition of Care  Outcome: Ongoing, Progressing     Problem: Fluid and Electrolyte Imbalance (Acute Kidney Injury/Impairment)  Goal: Fluid and Electrolyte Balance  Outcome: Ongoing, Progressing     Problem: Oral Intake Inadequate (Acute Kidney Injury/Impairment)  Goal: Optimal Nutrition Intake  Outcome: Ongoing, Progressing     Problem: Renal Function Impairment (Acute Kidney Injury/Impairment)  Goal: Effective Renal Function  Outcome: Ongoing, Progressing     Problem: Skin Injury Risk Increased  Goal: Skin Health and Integrity  Outcome: Ongoing, Progressing     Problem: Impaired Wound Healing  Goal: Optimal Wound Healing  Outcome: Ongoing, Progressing     Problem: Fall Injury Risk  Goal: Absence of Fall and Fall-Related Injury  Outcome: Ongoing, Progressing     Problem: Infection  Goal: Absence of Infection Signs and Symptoms  Outcome: Ongoing, Progressing     Problem: Device-Related Complication Risk (Hemodialysis)  Goal: Safe, Effective Therapy Delivery  Outcome: Ongoing, Progressing     Problem: Hemodynamic Instability (Hemodialysis)  Goal: Effective Tissue Perfusion  Outcome: Ongoing, Progressing     Problem: Infection (Hemodialysis)  Goal: Absence of Infection Signs and Symptoms  Outcome: Ongoing, Progressing   Pt is A+Ox4. Pt's mom educated said nurse that she did not feel her son-in-law could continue to care for her daughter. Said nurse made SW aware. SW gave said nurse APS number for mom to call if she feels she needs to.

## 2023-05-24 NOTE — NURSING
Patient bed scale is not working at this time.  Unable to get patient bed.  Possible new bed will be needed.

## 2023-05-24 NOTE — ASSESSMENT & PLAN NOTE
Recent Labs   Lab 05/23/23  0557 05/23/23  1710 05/24/23  0906   WBC 6.70 6.09 7.07  7.07   HGB 8.4* 8.5* 8.6*  8.6*   HCT 28.0* 28.2* 30.9*  30.9*    279 242  242     Lab Results   Component Value Date    FESATURATED 24 02/04/2022    FERRITIN 1,103 (H) 02/04/2022     - Goal is Hgb of 10-11.   - Obtain iron studies in AM   - plan for EPO with dialysis.

## 2023-05-24 NOTE — ASSESSMENT & PLAN NOTE
Mineral Bone Disease  Lab Results   Component Value Date    .5 (H) 05/16/2023    CALCIUM 8.8 05/24/2023    CAION 0.92 (L) 05/17/2023    PHOS 5.7 (H) 05/24/2023       - Cont calcitriol 0.5mg  - Cont calcium acetate 1334mg  - Stop sevelamer 800mg TID.   - Renal diet with protein intake goal 1.5 g/kg/d with 1 L fluid restriction   - Novasource with meals  - Daily renal panel so that phos and albumin is monitored daily.

## 2023-05-24 NOTE — ASSESSMENT & PLAN NOTE
Last Echo 6/21 showing     The estimated ejection fraction is 55%.   The left ventricle is normal in size with normal systolic function.   Normal left ventricular diastolic function.   Normal right ventricular size with normal right ventricular systolic function.   Mild tricuspid regurgitation.   The estimated PA systolic pressure is 31 mmHg.   Normal central venous pressure (3 mmHg).    New Echo   The left ventricle is mildly enlarged with severely decreased systolic function. The estimated ejection fraction is 15%.   There is severe left ventricular global hypokinesis.   Normal right ventricular size with low normal right ventricular systolic function.   Grade I left ventricular diastolic dysfunction.   Biatrial enlargement.   Mild-to-moderate mitral regurgitation.   Severe tricuspid regurgitation.   The estimated PA systolic pressure is 43 mmHg.   Elevated central venous pressure (15 mmHg).           RHC  CVP 15  Wedge 25-32    Patient did not tolerate dobutamine, had chest pain. Dobutamine dc'd  Per cardiology the patients significant peripheral vascular disease prohibits advanced options    - Torsemide daily   - Plan for HD on 5/25  - Isosorbide dinitrate   - Coreg 6.25mg BID

## 2023-05-24 NOTE — ASSESSMENT & PLAN NOTE
Elevating BUN. Worsening insomnia.   Will discuss goals and plan with nephrology.   5/22 encephalopathic  Plan for IR tunneled line on 5/25 and HD after

## 2023-05-24 NOTE — SUBJECTIVE & OBJECTIVE
Interval History: Patient more alert today. Oriented to person and place but not time. Plan for IR tunneled cath tomorrow.     Review of Systems   Unable to perform ROS: Mental status change   Objective:     Vital Signs (Most Recent):  Temp: 97.7 °F (36.5 °C) (05/24/23 1108)  Pulse: 82 (05/24/23 1108)  Resp: 17 (05/24/23 1108)  BP: (!) 112/57 (05/24/23 1108)  SpO2: 97 % (05/24/23 1108) Vital Signs (24h Range):  Temp:  [97.4 °F (36.3 °C)-98 °F (36.7 °C)] 97.7 °F (36.5 °C)  Pulse:  [73-84] 82  Resp:  [14-18] 17  SpO2:  [94 %-97 %] 97 %  BP: (112-133)/(56-65) 112/57     Weight:  (bed scale not working)  Body mass index is 35.88 kg/m².    Intake/Output Summary (Last 24 hours) at 5/24/2023 1108  Last data filed at 5/23/2023 1750  Gross per 24 hour   Intake 240 ml   Output 325 ml   Net -85 ml         Physical Exam  Constitutional:       General: She is not in acute distress.  HENT:      Right Ear: External ear normal.      Left Ear: External ear normal.      Nose: No congestion or rhinorrhea.      Mouth/Throat:      Mouth: Mucous membranes are moist.   Cardiovascular:      Rate and Rhythm: Normal rate and regular rhythm.      Pulses: Normal pulses.      Heart sounds: Normal heart sounds.   Pulmonary:      Effort: No respiratory distress.      Breath sounds: Normal breath sounds. No wheezing or rales.   Abdominal:      General: There is no distension.      Tenderness: There is no abdominal tenderness. There is no rebound.   Musculoskeletal:      Cervical back: No rigidity or tenderness.      Right lower leg: Edema present.      Left lower leg: Edema present.      Comments: Bilateral lower extremity amputation, pitting edema present   Neurological:      Mental Status: She is disoriented.      Comments: Asterixis present           Significant Labs: All pertinent labs within the past 24 hours have been reviewed.    Significant Imaging: I have reviewed all pertinent imaging results/findings within the past 24 hours.

## 2023-05-24 NOTE — CONSULTS
"Tunneled Catheter Placement Consult Note  Interventional Radiology    Consult Requested By: Batsheva Torres DO   Reason for Consult: "Tunnel cath placement "    SUBJECTIVE:     Chief Complaint:  long term dialysis    History of Present Illness:  Suyapa Connelly is a 57 y.o. female with a past medical history of ROSLYN with CKD IV, CAD with CABG, heart failure with most recent EF 15%, Atrial fibrillation om eliquis at home, and above knee amputation with PVD who was admitted on 5/1/23 for heart failure.      Interventional Radiology has been consulted for tunneled catheter placement for dialysis. Patient was scheduled to have the tunneled dialysis catheter placed with interventional nephrology on 5/23/23 but was unable to have it placed due to scheduling. WBC is 7, patient is afebrile.     Review of Systems   Constitutional: Negative.    Respiratory: Negative.     Cardiovascular: Negative.    Gastrointestinal: Negative.    Musculoskeletal: Negative.    Skin: Negative.    Neurological: Negative.       Scheduled Meds:   allopurinoL  50 mg Oral Every other day    amitriptyline  50 mg Oral QHS    atorvastatin  80 mg Oral Daily    calcitRIOL  0.5 mcg Oral Daily    calcium acetate(phosphat bind)  1,334 mg Oral TID WM    carvediloL  6.25 mg Oral BID    famotidine  20 mg Oral Daily    heparin (PORCINE)  60 Units/kg (Adjusted) Intravenous Once    hydrALAZINE  75 mg Oral TID    insulin detemir U-100  4 Units Subcutaneous QHS    isosorbide dinitrate  40 mg Oral TID    sertraline  50 mg Oral Daily    sevelamer carbonate  800 mg Oral TID WM    torsemide  20 mg Oral Daily     Continuous Infusions:   heparin (porcine) in D5W Stopped (05/23/23 0045)     PRN Meds:acetaminophen, dextrose 10%, dextrose 10%, dextrose, dextrose, glucagon (human recombinant), heparin (porcine), heparin (PORCINE), heparin (PORCINE), HYDROmorphone, insulin aspart U-100, melatonin, naloxone, ondansetron, sodium chloride 0.9%, sodium chloride 0.9%, sodium " chloride 0.9%, sodium chloride 0.9%    Review of patient's allergies indicates:   Allergen Reactions    Ciprofloxacin Itching    Contrast media      Kidney injury    Iodine      Kidney injury       Past Medical History:   Diagnosis Date    Anxiety     Chronic pain syndrome     CKD (chronic kidney disease), stage III     Depression     Diabetes mellitus     type 2    Diabetes mellitus, type 2     GERD (gastroesophageal reflux disease)     Hyperemesis 3/23/2021    Hyperlipemia     Hypertension     Hypokalemia 3/23/2021    Infection of below knee amputation stump 3/12/2022    Myocardial infarction 2010    minor-caused by stress per pt.    Osteomyelitis     Osteomyelitis of left foot 4/30/2021    PVD (peripheral vascular disease)     Ulcer of left foot     Vaginal delivery     x1     Past Surgical History:   Procedure Laterality Date    ABOVE-KNEE AMPUTATION Left 5/18/2021    Procedure: AMPUTATION, ABOVE KNEE;  Surgeon: Teddy Huber MD;  Location: [address];  Service: Vascular;  Laterality: Left;    ABOVE-KNEE AMPUTATION Right 3/18/2022    Procedure: AMPUTATION, ABOVE KNEE;  Surgeon: DAYNE Florez II, MD;  Location: [facility] OR [address] [place] [address];  Service: Vascular;  Laterality: Right;    Angiogram - Right Extremity Right 7/9/15    angiogram-left leg  10/6/15    ANGIOGRAPHY OF LOWER EXTREMITY Left 4/29/2021    Procedure: ANGIOGRAM, LOWER EXTREMITY;  Surgeon: Teddy Huber MD;  Location: [facility] OR [address] [place] [address];  Service: Vascular;  Laterality: Left;    BELOW KNEE AMPUTATION OF LOWER EXTREMITY Right 12/28/2021    Procedure: AMPUTATION, BELOW KNEE;  Surgeon: Kaitlyn Rojas MD;  Location: [facility] OR;  Service: General;  Laterality: Right;    CATHETERIZATION OF BOTH LEFT AND RIGHT HEART N/A 12/18/2019    Procedure: CATHETERIZATION, HEART, BOTH LEFT AND RIGHT;  Surgeon: Que Fernando III, MD;  Location: [facility] CATH LAB;  Service: Cardiology;  Laterality: N/A;    CORONARY ANGIOGRAPHY N/A 12/18/2019    Procedure:  ANGIOGRAM, CORONARY ARTERY;  Surgeon: Que Fernando III, MD;  [facility] CATH LAB;  Service: Cardiology;  Laterality: N/A;    CORONARY ANGIOGRAPHY INCLUDING BYPASS GRAFTS WITH CATHETERIZATION OF LEFT HEART N/A 7/28/2020    Procedure: ANGIOGRAM, CORONARY, INCLUDING BYPASS GRAFT, WITH LEFT HEART CATHETERIZATION, 9 am;  Surgeon: Rachel Easley MD;  Location: [facility] CATH LAB;  Service: Cardiology;  Laterality: N/A;    CORONARY ARTERY BYPASS GRAFT (CABG) N/A 1/14/2020    Procedure: CORONARY ARTERY BYPASS GRAFT (CABG) x 1     Off Pump;  Surgeon: Huang Altamirano MD;  Location: [facility] OR [address] [place] [address];  Service: Cardiovascular;  Laterality: N/A;    CREATION OF FEMORAL-TIBIAL ARTERY BYPASS Left 4/29/2021    Procedure: CREATION, BYPASS, ARTERIAL, FEMORAL TO ANTERIOR TIBIAL;  Surgeon: Teddy Huber MD;  Location: [facility] OR [address] [place] [address];  Service: Vascular;  Laterality: Left;    CREATION OF FEMOROPOPLITEAL ARTERIAL BYPASS USING GRAFT Left 8/18/2020    Procedure: CREATION, BYPASS, ARTERIAL, FEMORAL TO POPLITEAL, USING GRAFT, LEFT LOWER EXTREMITY;  Surgeon: Teddy Huber MD;  Location: [facility];  Service: Vascular;  Laterality: Left;  REQUEST 7:15 A.M. START----COVID NEGATIVE ON 8/17 1ST CASE STARTKENYA PER LEANA ON 8/7/2020 @ 942AM-LO  RN PREOP 8/12/2020   T/S-----CLEARED BY CARDS-------PENDING INSURANCE    DEBRIDEMENT OF FOOT Left 9/8/2020    Procedure: DEBRIDEMENT, FOOT;  Surgeon: Rosio Mayes DPM;  Location: [facility] OR;  Service: Podiatry;  Laterality: Left;  request neoxx .   RN Pre Op 9-4-2020, Covid negative on 9/5/20. C A    DEBRIDEMENT OF FOOT  3/4/2021    Procedure: DEBRIDEMENT, FOOT;  Surgeon: Teddy Huber MD;  Location: [facility] OR;  Service: Vascular;;    DEBRIDEMENT OF FOOT Left 3/9/2021    Procedure: DEBRIDEMENT, FOOT, bone biopsy;  Surgeon: Rosio Mayes DPM;  Location: [facility] OR;  Service: Podiatry;  Laterality: Left;  Request neoxx---COVID IN AM  REQUESTING NOON START  RN Phone Pre op.On Blood  thinners Plavix and Eliquis.  Covid  of surgery. C A    DEBRIDEMENT OF FOOT Left 5/4/2021    Procedure: DEBRIDEMENT, FOOT;  Surgeon: Farooq Morley DPM;  Location: [facility] OR 2ND FLR;  Service: Podiatry;  Laterality: Left;    INSERTION OF TUNNELED CENTRAL VENOUS HEMODIALYSIS CATHETER N/A 1/27/2020    Procedure: Insertion, Catheter, Central Venous, Hemodialysis;  Surgeon: ESTEBAN Gomez III, MD;  Location: [facility] CATH LAB;  Service: Peripheral Vascular;  Laterality: N/A;    INSERTION OF TUNNELED CENTRAL VENOUS HEMODIALYSIS CATHETER  5/10/2023    Procedure: Insertion, Catheter, Central Venous, Hemodialysis;  Surgeon: Romulo Queen MD;  Location: [facility] CATH LAB;  Service: Cardiology;;    PERCUTANEOUS TRANSLUMINAL ANGIOPLASTY N/A 3/4/2021    Procedure: PTA (ANGIOPLASTY, PERCUTANEOUS, TRANSLUMINAL);  Surgeon: Teddy Huber MD;  Location: [facility] OR;  Service: Vascular;  Laterality: N/A;    REMOVAL OF ARTERIOVENOUS GRAFT Left 5/27/2021    Procedure: REMOVAL, GRAFT, LEFT LOWER EXTREMITY, WOUND EXPLORATION;  Surgeon: Teddy Huber MD;  Location: [facility] 2ND FLR;  Service: Vascular;  Laterality: Left;    REMOVAL OF NAIL OF DIGIT Left 3/9/2021    Procedure: REMOVAL, NAIL, DIGIT;  Surgeon: Rosio Mayes DPM;  Location: [facility] OR;  Service: Podiatry;  Laterality: Left;    RIGHT HEART CATHETERIZATION Right 5/10/2023    Procedure: INSERTION, CATHETER, RIGHT HEART;  Surgeon: Romulo Queen MD;  Location: [facility] CATH LAB;  Service: Cardiology;  Laterality: Right;    THROMBECTOMY Left 3/4/2021    Procedure: THROMBECTOMY, LEFT LOWER EXTREMITY BYPASS GRAFT, ANGIOGRAM, POSSIBLE INTERVENTION, POSSIBLE LEFT LOWER EXTREMITY BYPASS;  Surgeon: Teddy Huber MD;  Location: [facility] OR;  Service: Vascular;  Laterality: Left;    THROMBECTOMY Left 4/29/2021    Procedure: GRAFT THROMBECTOMY, LEFT LOWER EXTREMITY;  Surgeon: Teddy Huber MD;  Location: [facility] OR 2ND FLR;  Service: Vascular;  Laterality: Left;  14.5  min  1179.85 mGy  341.01 Gycm2  240 ml dye    THROMBECTOMY  10/22/2021    Procedure: THROMBECTOMY;  Surgeon: Saad Arenas MD;  Location: [facility] CATH LAB/EP;  Service: Cardiology;;     Family History   Problem Relation Age of Onset    Diabetes Mother     Diabetes Father     Heart disease Maternal Grandmother     No Known Problems Maternal Grandfather     Diabetes Paternal Grandmother     No Known Problems Paternal Grandfather     Anesthesia problems Neg Hx      Social History     Tobacco Use    Smoking status: Former    Smokeless tobacco: Never   Substance Use Topics    Alcohol use: No    Drug use: Yes     Types: Marijuana     Comment: occassional       OBJECTIVE:     Vital Signs (Most Recent)  Temp: 97.8 °F (36.6 °C) (05/24/23 0820)  Pulse: 84 (05/24/23 0820)  Resp: 16 (05/24/23 0820)  BP: 133/62 (05/24/23 0820)  SpO2: 95 % (05/24/23 0820)    Physical Exam:   Physical Exam  Constitutional:       Appearance: She is obese.   HENT:      Head: Normocephalic.   Cardiovascular:      Rate and Rhythm: Normal rate.   Pulmonary:      Effort: Pulmonary effort is normal.   Abdominal:      General: Abdomen is flat.   Neurological:      Mental Status: She is alert. Mental status is at baseline.   Psychiatric:         Mood and Affect: Mood normal.       Laboratory  I have reviewed all pertinent lab results within the past 24 hours.  CBC:   Recent Labs   Lab 05/24/23  0906   WBC 7.07  7.07   RBC 2.83*  2.83*   HGB 8.6*  8.6*   HCT 30.9*  30.9*     242   *  109*   MCH 30.4  30.4   MCHC 27.8*  27.8*     CMP:   Recent Labs   Lab 05/18/23  0428 05/18/23  1835 05/24/23  0906      < > 71   CALCIUM 7.4*   < > 8.8   ALBUMIN 2.3*  --   --    PROT 7.2  --   --    *   < > 134*   K 4.4   < > 4.4   CO2 25   < > 21*   CL 93*   < > 94*   BUN 80*   < > 94*   CREATININE 6.7*   < > 7.9*   ALKPHOS 181*  --   --    ALT 6*  --   --    AST 25  --   --    BILITOT 0.3  --   --     < > = values in this interval not  displayed.     Coagulation:   Recent Labs   Lab 05/23/23  1710 05/24/23  0906   LABPROT 11.9  --    INR 1.1  --    APTT 30.8 23.1       ASA/Mallampati  ASA: 2  Mallampati: 2      ASSESSMENT/PLAN:     Assessment:  57 y.o. female who has been referred to IR for tunneled catheter placement for dialysis.    Plan:  Will proceed with tunneled catheter placement for dialysis on 5/25/23. Given EF 15%, patient will require anesthesia monitoring per IR nurses  Please keep pt NPO starting at midnight on day of procedure   Anticoagulation history reviewed.   Coagulation labs reviewed. INR 1.1  Thank you for the consult. Please contact with questions via [employer] secure chat.    Cindy Jacobs PA-C  Interventional Radiology  Spectra [id number]  5/24/2023

## 2023-05-24 NOTE — PROGRESS NOTES
[facility] - [facility]iology Stepdown  Nephrology  Progress Note    Patient Name: Suyapa Connelly  MRN: [id number]  Admission Date: 5/1/2023  Hospital Length of Stay: 21 days  Attending Provider: Batsheva Torres DO   Primary Care Physician: Magen Christensen MD  Principal Problem:Acute on chronic combined systolic and diastolic heart failure    Subjective:     HPI: 57 y.o. female with past medical history of CAD s/p CABG, CKD, DM, HTN, HLD, s/p bilateral AKA who presnets with chest pain, difficulty breathing, leg swelling. Her most recent ECHO revealed severe systolic dysfunction with EF of 15%. She has been started on diuretics with IV lasix and PO metolazone with adequate urinary response however her renal function has been deteriorating throughout the admission. She reports requiring dialysis at one time before, around the time she underwent her AKAs. Her baseline serum creatinine is in the 2s and it has trended up to 5 during this admission. Nephrology consulted for further evaluation of ROSLYN on CKD.         Interval History: Seen and evaluated by bedside. Making urine. Confused but noted to be at baseline per patient . Scr stable currently. No severe electrolyte abnormalities on most recent labs. Scheduled for perm cath placement tomorrow with IR. Plan for HD after.   Review of patient's allergies indicates:   Allergen Reactions    Ciprofloxacin Itching    Contrast media      Kidney injury    Iodine      Kidney injury     Current Facility-Administered Medications   Medication Frequency    acetaminophen tablet 1,000 mg Q8H PRN    allopurinol split tablet 50 mg Every other day    amitriptyline tablet 50 mg QHS    apixaban tablet 5 mg BID    atorvastatin tablet 80 mg Daily    calcitRIOL capsule 0.5 mcg Daily    calcium acetate(phosphat bind) capsule 1,334 mg TID WM    carvediloL tablet 6.25 mg BID    clopidogreL tablet 75 mg Daily    dextrose 10% bolus 125 mL 125 mL PRN    dextrose 10% bolus 250 mL 250 mL  PRN    dextrose 40 % gel 15,000 mg PRN    dextrose 40 % gel 30,000 mg PRN    famotidine tablet 20 mg Daily    glucagon (human recombinant) injection 1 mg PRN    hydrALAZINE tablet 75 mg TID    HYDROmorphone tablet 2 mg BID PRN    insulin aspart U-100 pen 0-5 Units QID (AC + HS) PRN    insulin detemir U-100 (Levemir) pen 8 Units QHS    isosorbide dinitrate tablet 40 mg TID    melatonin tablet 6 mg Nightly PRN    naloxone 0.4 mg/mL injection 0.02 mg PRN    ondansetron disintegrating tablet 8 mg Q8H PRN    sertraline tablet 50 mg Daily    sevelamer carbonate tablet 800 mg TID WM    sodium chloride 0.9% flush 10 mL Q12H PRN    sodium chloride 0.9% flush 10 mL PRN    sodium chloride 0.9% flush 10 mL PRN       Objective:     Vital Signs (Most Recent):  Temp: 98 °F (36.7 °C) (05/23/23 0507)  Pulse: 83 (05/23/23 0507)  Resp: 18 (05/23/23 0507)  BP: (!) 105/52 (05/23/23 0507)  SpO2: 99 % (05/23/23 0507) Vital Signs (24h Range):  Temp:  [98 °F (36.7 °C)-98.2 °F (36.8 °C)] 98 °F (36.7 °C)  Pulse:  [79-86] 83  Resp:  [16-19] 18  SpO2:  [94 %-99 %] 99 %  BP: (101-115)/(52-67) 105/52     Weight:  (bed scale not working) (05/23/23 0518)  Body mass index is 35.88 kg/m².  Body surface area is 2.12 meters squared.    I/O last 3 completed shifts:  In: 222 [P.O.:222]  Out: 1200 [Urine:1200]    Physical Exam  Vitals and nursing note reviewed.   Constitutional:       General: She is not in acute distress.     Appearance: Normal appearance. She is obese. She is not ill-appearing or diaphoretic.   HENT:      Head: Normocephalic and atraumatic.      Right Ear: External ear normal.      Left Ear: External ear normal.      Nose: Nose normal.      Mouth/Throat:      Mouth: Mucous membranes are moist.      Pharynx: Oropharynx is clear.   Eyes:      General: No scleral icterus.     Extraocular Movements: Extraocular movements intact.      Conjunctiva/sclera: Conjunctivae normal.      Pupils: Pupils are equal, round, and reactive  to light.   Cardiovascular:      Rate and Rhythm: Normal rate and regular rhythm.      Pulses: Normal pulses.      Heart sounds: Normal heart sounds. No murmur heard.  Pulmonary:      Effort: Pulmonary effort is normal. No respiratory distress.      Breath sounds: Normal breath sounds. No stridor. No wheezing or rhonchi.   Abdominal:      General: Abdomen is flat. There is no distension.      Palpations: Abdomen is soft.      Tenderness: There is no abdominal tenderness. There is no guarding.   Musculoskeletal:         General: Swelling present. No tenderness. Normal range of motion.      Cervical back: Normal range of motion and neck supple. No rigidity or tenderness.      Right lower leg: Edema present.      Left lower leg: Edema present.      Comments: Trigger finger R middle finger  BL AKA stump edema +2  L thigh tenderness with palpation    Skin:     General: Skin is warm and dry.      Capillary Refill: Capillary refill takes less than 2 seconds.      Coloration: Skin is not jaundiced.      Findings: No erythema.   Neurological:      General: No focal deficit present.      Mental Status: She is alert and oriented to person, place, and time.   Psychiatric:         Mood and Affect: Mood normal.         Behavior: Behavior normal.        Significant Labs:  CBC:   Recent Labs   Lab 05/23/23  0557   WBC 6.70   RBC 2.76*   HGB 8.4*   HCT 28.0*      *   MCH 30.4   MCHC 30.0*       CMP:   Recent Labs   Lab 05/18/23  0428 05/18/23  1835 05/23/23  0557      < > 83   CALCIUM 7.4*   < > 9.0   ALBUMIN 2.3*  --   --    PROT 7.2  --   --    *   < > 133*   K 4.4   < > 4.2   CO2 25   < > 26   CL 93*   < > 91*   BUN 80*   < > 94*   CREATININE 6.7*   < > 7.9*   ALKPHOS 181*  --   --    ALT 6*  --   --    AST 25  --   --    BILITOT 0.3  --   --     < > = values in this interval not displayed.            Assessment/Plan:     Cardiac/Vascular  * Acute on chronic combined systolic and diastolic heart  failure  Management per primary       S/P CABG x 1  Management per primary       Renal/  Chronic kidney disease-mineral and bone disorder  Mineral Bone Disease  Lab Results   Component Value Date    .5 (H) 05/16/2023    CALCIUM 8.8 05/24/2023    CAION 0.92 (L) 05/17/2023    PHOS 5.7 (H) 05/24/2023       - Cont calcitriol 0.5mg  - Cont calcium acetate 1334mg  - Stop sevelamer 800mg TID.   - Renal diet with protein intake goal 1.5 g/kg/d with 1 L fluid restriction   - Novasource with meals  - Daily renal panel so that phos and albumin is monitored daily.       ROSLYN (acute kidney injury)  - ROSLYN on CKD, likely ischemic, CRS and diuresis  - Baseline serum creatinine in the 2s, up to 3.9 on admission; up to 7.9 on her most recent labs  - Retroperitoneal US with no hydronephrosis    - ECHO with severe systolic dysfunction, EF of 15%    - RHC with PCWP of 30   - patient with increase confusion, showing some mild signs of uremia.   - discussion held with family who are in agreement with starting patient on dialysis.   Plan:  - obtain perm cath placement  - plan to initiate dialysis inpatient.   -  consult for HD chair placement.   - renally dose all meds  - Avoid nephrotoxic agents as much as possible    - cont torsemide 20 mg QD    Uremia  See ROSLYN    Oncology  Anemia    Recent Labs   Lab 05/23/23  0557 05/23/23  1710 05/24/23  0906   WBC 6.70 6.09 7.07  7.07   HGB 8.4* 8.5* 8.6*  8.6*   HCT 28.0* 28.2* 30.9*  30.9*    279 242  242     Lab Results   Component Value Date    FESATURATED 24 02/04/2022    FERRITIN 1,103 (H) 02/04/2022     - Goal is Hgb of 10-11.   - Obtain iron studies in AM   - plan for EPO with dialysis.             Thank you for your consult. I will follow-up with patient. Please contact us if you have any additional questions.     Case discussed with attending. Attestation to follow.       Jay Qureshi DO  Nephrology  [facility] - [facility]iology Stepdown

## 2023-05-24 NOTE — ASSESSMENT & PLAN NOTE
ROSLYN on CKD, likely ischemic, CRS and diuresis  Baseline serum creatinine in the 2s, up to 3.9 on admission; new baseline in the 7's   Retroperitoneal US with no hydronephrosis    ECHO with severe systolic dysfunction, EF of 15%    RHC with PCWP of 30   Plan for tunneled line and HD on 5/25

## 2023-05-24 NOTE — PLAN OF CARE
05/24/23 1155   Post-Acute Status   Post-Acute Authorization HME   HME Status Pending medical clearance/testing     EDUARD informed by Michelle Rooney ([phone]) that they can supply pt with a hospital bed but that the Brandee lift would need be ordered and could take 15-20 days to be delivered.  SW requested that they contact pt's  to discuss.  SW relayed the above to pt's  who voiced agreement with plan.  Will continue to follow.      Jenna Bonilla LMSW  [facility]  [id number]

## 2023-05-24 NOTE — PROGRESS NOTES
[facility] - Cardiology [facility]  Progress Note    Patient Name: Suyapa Connelly  MRN: [id number]  Patient Class: IP- Inpatient   Admission Date: 5/1/2023  Length of Stay: 21 days  Attending Physician: Batsheva Torres DO  Primary Care Provider: Magen Christensen MD        Subjective:     Principal Problem:Acute on chronic combined systolic and diastolic heart failure        HPI:  57 y.o. female with past medical history of CAD s/p CABG, CKD, DM, HTN, HLD, s/p bilateral AKA who presnets with chest pain, difficulty breathing, leg swelling.     Per ED She has been unable to access any of her medications for the last 3 months. Over the last few months she has noted worsening shortness of breath, particulatly orthopnea, and leg swelling. In the last two weeks she endorses associated abdominal pain and swelling, reduced appetite, and pleuritic chest pain with deep inspiration. She endorses occasional lighthededness with changes in position. Last week she had an episode where she was unable to breathe for about two minutes which was very upsetting for her and her , but she recovered and did not seek care at that time. Today, she complains of significant difficulty breathing, swelling in her legs and abdomen, and reduced appetite.     No headaches, visual changes, URI symptoms, palpitations, nausea, vomiting, diarrhea, blood in her stool, dysuria, hematuria, numbness or weakness in her extremities. She does endorse occasional paresthesias associated with her chronic phantom limb pain. She additionally has had increased difficulties with depression recently, denies suicidal or homicidal ideation, but has had considerable stress and low mood.      Of note, the patient saw a new primary care provider 4/21 for similar concerns who recommended she proceed to the ED at that time, but they were unable due to financial and geographic concerns. She lives two hours away from [facility] and her  cares for her and  elderly mother with dementia. She has had decreased access to medical care over the last year, and unable to access most of her prescriptions. She had a 90 day supply of four medications (lasix, statin, clopidogrel, sertraline) but these were accidentally lost and she has been unable to afford a replacement. They present today for evaluation and to reestablish care and access to her prescriptions.     Labs on admission significant for an elevated BNP, hypocalcemia which corrected is 8.3, significant elevation in Creatinine and a slight elevation of troponin    CXR  Cardiomegaly with pulmonary interstitial edema, suggestive of pulmonary edema secondary to CHF.      Overview/Hospital Course:  Diuresis initiated with Lasix IV.  1.2L UOP in the past 24 hours.  Vitals reassuring with adequate sats on ambient air.  Still very edematious; Cardiology recs lasix gtt.  GDMT initiated. Pt given 2mg morphine for moderate to severe pain in legs from swelling. Diuresis increased to 40mg/hr due to inadequate urine output. Cr continued to increase. S/p RHC that showed wedge of 26-32 with CVP of 15. Patient with no improvement in renal function, nephrology believes this is her new baseline. On diuresis but not making much urine. Plan for IR tunneled cath on 5/25 and will begin HD.       Interval History: Patient more alert today. Oriented to person and place but not time. Plan for IR tunneled cath tomorrow.     Review of Systems   Unable to perform ROS: Mental status change   Objective:     Vital Signs (Most Recent):  Temp: 97.7 °F (36.5 °C) (05/24/23 1108)  Pulse: 82 (05/24/23 1108)  Resp: 17 (05/24/23 1108)  BP: (!) 112/57 (05/24/23 1108)  SpO2: 97 % (05/24/23 1108) Vital Signs (24h Range):  Temp:  [97.4 °F (36.3 °C)-98 °F (36.7 °C)] 97.7 °F (36.5 °C)  Pulse:  [73-84] 82  Resp:  [14-18] 17  SpO2:  [94 %-97 %] 97 %  BP: (112-133)/(56-65) 112/57     Weight:  (bed scale not working)  Body mass index is 35.88 kg/m².    Intake/Output  Summary (Last 24 hours) at 5/24/2023 1108  Last data filed at 5/23/2023 1750  Gross per 24 hour   Intake 240 ml   Output 325 ml   Net -85 ml         Physical Exam  Constitutional:       General: She is not in acute distress.  HENT:      Right Ear: External ear normal.      Left Ear: External ear normal.      Nose: No congestion or rhinorrhea.      Mouth/Throat:      Mouth: Mucous membranes are moist.   Cardiovascular:      Rate and Rhythm: Normal rate and regular rhythm.      Pulses: Normal pulses.      Heart sounds: Normal heart sounds.   Pulmonary:      Effort: No respiratory distress.      Breath sounds: Normal breath sounds. No wheezing or rales.   Abdominal:      General: There is no distension.      Tenderness: There is no abdominal tenderness. There is no rebound.   Musculoskeletal:      Cervical back: No rigidity or tenderness.      Right lower leg: Edema present.      Left lower leg: Edema present.      Comments: Bilateral lower extremity amputation, pitting edema present   Neurological:      Mental Status: She is disoriented.      Comments: Asterixis present           Significant Labs: All pertinent labs within the past 24 hours have been reviewed.    Significant Imaging: I have reviewed all pertinent imaging results/findings within the past 24 hours.      Assessment/Plan:      * Acute on chronic combined systolic and diastolic heart failure  Last Echo 6/21 showing     The estimated ejection fraction is 55%.   The left ventricle is normal in size with normal systolic function.   Normal left ventricular diastolic function.   Normal right ventricular size with normal right ventricular systolic function.   Mild tricuspid regurgitation.   The estimated PA systolic pressure is 31 mmHg.   Normal central venous pressure (3 mmHg).    New Echo   The left ventricle is mildly enlarged with severely decreased systolic function. The estimated ejection fraction is 15%.   There is severe left ventricular global  hypokinesis.   Normal right ventricular size with low normal right ventricular systolic function.   Grade I left ventricular diastolic dysfunction.   Biatrial enlargement.   Mild-to-moderate mitral regurgitation.   Severe tricuspid regurgitation.   The estimated PA systolic pressure is 43 mmHg.   Elevated central venous pressure (15 mmHg).           RHC  CVP 15  Wedge 25-32    Patient did not tolerate dobutamine, had chest pain. Dobutamine dc'd  Per cardiology the patients significant peripheral vascular disease prohibits advanced options    - Torsemide daily   - Plan for HD on 5/25  - Isosorbide dinitrate   - Coreg 6.25mg BID             Congestive heart failure  See primary problem    Echo    Interpretation Summary  · The left ventricle is mildly enlarged with severely decreased systolic function. The estimated ejection fraction is 15%.  · There is severe left ventricular global hypokinesis.  · Normal right ventricular size with low normal right ventricular systolic function.  · Grade I left ventricular diastolic dysfunction.  · Biatrial enlargement.  · Mild-to-moderate mitral regurgitation.  · Severe tricuspid regurgitation.  · The estimated PA systolic pressure is 43 mmHg.  · Elevated central venous pressure (15 mmHg).    On Torsemide   Plan for HD on 5/25    Myalgia  Will continue to treat pain as needed      ROSLYN (acute kidney injury)  ROSLYN on CKD, likely ischemic, CRS and diuresis  Baseline serum creatinine in the 2s, up to 3.9 on admission; new baseline in the 7's   Retroperitoneal US with no hydronephrosis    ECHO with severe systolic dysfunction, EF of 15%    RHC with PCWP of 30   Plan for tunneled line and HD on 5/25    CKD (chronic kidney disease), stage IV  History of CKD. See ROSLYN.      Above-knee amputation of right lower extremity  S/P amputation of both lower limbs     Uses self-applied continuous glucose monitoring device  POCT glucose and daily CMPs    -Restart insulin for  hyperglycemia      Dermatitis associated with moisture  Large body habitus with dermatitis to folds. Will monitor for infection.    -Zinc based powder  -Topical abx if indicated      Status post above knee amputation, left        Hypoalbuminemia  Pt with poor PO intake reported. Pt eats one meal a day and has associated nausea and vomiting. Will try antiemetics before feeds to improve intake. Will monitor daily intake and calorie replacement.     -Calorie count  -Diabetic/Renal diet  -Antiemetics before meals; Zofran 4 IV PRN  -Monitor Qtc for prolongation with antiemetics      Uremia  Elevating BUN. Worsening insomnia.   Will discuss goals and plan with nephrology.   5/22 encephalopathic  Plan for IR tunneled line on 5/25 and HD after    Impaired functional mobility and endurance  PT/OT for bedbound patient      Paroxysmal atrial fibrillation  Continue home rate control  NSR on EKG  On heparin drip for upcoming procedure           Anemia  Asymptomatic. Higher than 8 months ago. No signs of acute bleed at this time. Haptoglobin elevated.    -CBC daily  -Transfuse if <7        S/P CABG x 1  History of. Sternal scar well healed.       CAD (coronary artery disease)  History of CAD with retrosternal chest pain. EKG negative to ST changes. Nonspecific T wave inversions.     -Cardiac tele  -EKG prn  -Bidil per Cards recs  -Hold plavix for line placement; continue heparin    CAROLIN (generalized anxiety disorder)  Continue home anxiety medications      Essential hypertension  Continue home antihypertensives      Hypocalcemia  Ionized calcium at 0.76. Ca correcting at 8.4.     -Calcium acetate for calcium repletion and for phos binding.       PAD (peripheral artery disease)  History of bilateral AKA.           VTE Risk Mitigation (From admission, onward)         Ordered     heparin 25,000 units in dextrose 5% (100 units/ml) IV bolus from bag - ADDITIONAL PRN BOLUS - 60 units/kg  As needed (PRN)        Question:  Heparin  Infusion Adjustment (DO NOT MODIFY ANSWER)  Answer:  \\[email or web address]\epic\Images\Pharmacy\HeparinInfusions\heparin LOW INTENSITY nomogram for [facility] [id number].pdf    05/23/23 1637     heparin 25,000 units in dextrose 5% (100 units/ml) IV bolus from bag - ADDITIONAL PRN BOLUS - 30 units/kg  As needed (PRN)        Question:  Heparin Infusion Adjustment (DO NOT MODIFY ANSWER)  Answer:  \\[email or web address]\epic\Images\Pharmacy\HeparinInfusions\heparin LOW INTENSITY nomogram for [facility] [id number].pdf    05/23/23 1637     heparin 25,000 units in dextrose 5% 250 mL (100 units/mL) infusion LOW INTENSITY nomogram - [facility]  Continuous        Question Answer Comment   Heparin Infusion Adjustment (DO NOT MODIFY ANSWER) \\[email or web address]\epic\Images\Pharmacy\HeparinInfusions\heparin LOW INTENSITY nomogram for [facility] [id number].pdf    Begin at (in units/kg/hr) 12        05/23/23 1637     heparin (porcine) injection 1,000 Units  As needed (PRN)         05/23/23 1436     IP VTE HIGH RISK PATIENT  Once         05/07/23 1547                Discharge Planning   DEVAN: 5/25/2023     Code Status: Full Code   Is the patient medically ready for discharge?: No    Reason for patient still in hospital (select all that apply): Patient trending condition  Discharge Plan A: Home Health, Home with family   Discharge Delays: None known at this time              Clem Mcintyre MD  Department of Hospital Medicine   [facility] - Cardiology Stepdown

## 2023-05-24 NOTE — PROGRESS NOTES
[facility]  Psychology    Treatment Attempt  Patient Name:Suyapa Connelly   MRN:[id number]      Patient was experiencing delirium and unable to communicate coherently. Clinician conducted orientation assessment. Patient was oriented x 0. Clinician spoke with patient's  about outpatient follow up care for both himself and for the patient and provided resources. Clinician also provided psychoeducation on delirium to normalize the experience and minimize the distress experienced by the patient's . Also discussed how to reorient the patient. Pt's  has a lot of experience managing pt's delirium.        Aubrey Neil   Psychology Resident   Dept. of Psychiatry  [facility]

## 2023-05-24 NOTE — ASSESSMENT & PLAN NOTE
See primary problem    Echo    Interpretation Summary  · The left ventricle is mildly enlarged with severely decreased systolic function. The estimated ejection fraction is 15%.  · There is severe left ventricular global hypokinesis.  · Normal right ventricular size with low normal right ventricular systolic function.  · Grade I left ventricular diastolic dysfunction.  · Biatrial enlargement.  · Mild-to-moderate mitral regurgitation.  · Severe tricuspid regurgitation.  · The estimated PA systolic pressure is 43 mmHg.  · Elevated central venous pressure (15 mmHg).    On Torsemide   Plan for HD on 5/25

## 2023-05-24 NOTE — NURSING
Charge RN called Jill Scott MD for confirmation about heparin drip and NPO orders.  Heparin not started per MD's order. MD to put the NPO order for line placement tomorrow. Primary RN notified.

## 2023-05-24 NOTE — PLAN OF CARE
"Fax with the orders for hospital bed and jany lift did not go through to [facility].  Orders faxed to [facility]:    Your fax has been successfully sent to [phone] at [phone].  ------------------------------------------------------------  From: [id number]  ------------------------------------------------------------  5/24/2023 9:49:12 AM Transmission Record   Sent to [phone] with remote ID "[facility]"   Result: (0/339;0/0) Success   Page record: 1 - 27   Elapsed time: 08:21 on channel 58      UPDATE 10:20 AM  EDUARD received call from Michelle with [facility] ([phone]) confirming that orders were received and are being reviewed with the supervisor.      Jenna Bonilla LMSW  [facility]  [id number]    "

## 2023-05-24 NOTE — PLAN OF CARE
EDUARD and CRYSTAL Tidwell met with pt's mother Tea Sal in a private room per Ms Sal' request.  Ms Sal voiced multiple concerns about pt's  being able to care for pt at discharge, for which EDUARD provided Ms Sal with the phone number for [employer] and informed her that they would be the people to call regarding said concerns.  Ms Sal voiced understanding.  Ms Sal asked to be contacted at either [phone] or [phone] when pt is discharged so that she will be able to keep tabs on pt since per Ms Sal report pt's  has been limiting her contact with pt.      Jenna Bonilla, KYSW  [facility]  [id number]

## 2023-05-25 ENCOUNTER — ANESTHESIA EVENT (OUTPATIENT)
Dept: INTERVENTIONAL RADIOLOGY/VASCULAR | Facility: HOSPITAL | Age: 58
DRG: 286 | End: 2023-05-25
Payer: MEDICARE

## 2023-05-25 PROBLEM — N19 UREMIC ENCEPHALOPATHY: Status: ACTIVE | Noted: 2023-05-25

## 2023-05-25 PROBLEM — G93.41 ACUTE METABOLIC ENCEPHALOPATHY: Status: ACTIVE | Noted: 2023-05-25

## 2023-05-25 PROBLEM — G93.49 UREMIC ENCEPHALOPATHY: Status: ACTIVE | Noted: 2023-05-25

## 2023-05-25 LAB
ANION GAP SERPL CALC-SCNC: 13 MMOL/L (ref 8–16)
ANION GAP SERPL CALC-SCNC: 14 MMOL/L (ref 8–16)
APTT PPP: 34.1 SEC (ref 21–32)
BASOPHILS # BLD AUTO: 0.06 K/UL (ref 0–0.2)
BASOPHILS # BLD AUTO: 0.06 K/UL (ref 0–0.2)
BASOPHILS NFR BLD: 0.9 % (ref 0–1.9)
BASOPHILS NFR BLD: 0.9 % (ref 0–1.9)
BUN SERPL-MCNC: 100 MG/DL (ref 6–20)
BUN SERPL-MCNC: 59 MG/DL (ref 6–20)
CALCIUM SERPL-MCNC: 8.6 MG/DL (ref 8.7–10.5)
CALCIUM SERPL-MCNC: 9.2 MG/DL (ref 8.7–10.5)
CHLORIDE SERPL-SCNC: 94 MMOL/L (ref 95–110)
CHLORIDE SERPL-SCNC: 99 MMOL/L (ref 95–110)
CO2 SERPL-SCNC: 26 MMOL/L (ref 23–29)
CO2 SERPL-SCNC: 27 MMOL/L (ref 23–29)
CREAT SERPL-MCNC: 5.1 MG/DL (ref 0.5–1.4)
CREAT SERPL-MCNC: 7.8 MG/DL (ref 0.5–1.4)
DIFFERENTIAL METHOD: ABNORMAL
DIFFERENTIAL METHOD: ABNORMAL
EOSINOPHIL # BLD AUTO: 0.3 K/UL (ref 0–0.5)
EOSINOPHIL # BLD AUTO: 0.3 K/UL (ref 0–0.5)
EOSINOPHIL NFR BLD: 4 % (ref 0–8)
EOSINOPHIL NFR BLD: 4 % (ref 0–8)
ERYTHROCYTE [DISTWIDTH] IN BLOOD BY AUTOMATED COUNT: 13.6 % (ref 11.5–14.5)
ERYTHROCYTE [DISTWIDTH] IN BLOOD BY AUTOMATED COUNT: 13.6 % (ref 11.5–14.5)
EST. GFR  (NO RACE VARIABLE): 5.6 ML/MIN/1.73 M^2
EST. GFR  (NO RACE VARIABLE): 9.4 ML/MIN/1.73 M^2
GLUCOSE SERPL-MCNC: 112 MG/DL (ref 70–110)
GLUCOSE SERPL-MCNC: 82 MG/DL (ref 70–110)
HCT VFR BLD AUTO: 28.4 % (ref 37–48.5)
HCT VFR BLD AUTO: 28.4 % (ref 37–48.5)
HGB BLD-MCNC: 8.5 G/DL (ref 12–16)
HGB BLD-MCNC: 8.5 G/DL (ref 12–16)
IMM GRANULOCYTES # BLD AUTO: 0.02 K/UL (ref 0–0.04)
IMM GRANULOCYTES # BLD AUTO: 0.02 K/UL (ref 0–0.04)
IMM GRANULOCYTES NFR BLD AUTO: 0.3 % (ref 0–0.5)
IMM GRANULOCYTES NFR BLD AUTO: 0.3 % (ref 0–0.5)
LYMPHOCYTES # BLD AUTO: 0.9 K/UL (ref 1–4.8)
LYMPHOCYTES # BLD AUTO: 0.9 K/UL (ref 1–4.8)
LYMPHOCYTES NFR BLD: 14 % (ref 18–48)
LYMPHOCYTES NFR BLD: 14 % (ref 18–48)
MAGNESIUM SERPL-MCNC: 2 MG/DL (ref 1.6–2.6)
MAGNESIUM SERPL-MCNC: 2.2 MG/DL (ref 1.6–2.6)
MCH RBC QN AUTO: 30 PG (ref 27–31)
MCH RBC QN AUTO: 30 PG (ref 27–31)
MCHC RBC AUTO-ENTMCNC: 29.9 G/DL (ref 32–36)
MCHC RBC AUTO-ENTMCNC: 29.9 G/DL (ref 32–36)
MCV RBC AUTO: 100 FL (ref 82–98)
MCV RBC AUTO: 100 FL (ref 82–98)
MONOCYTES # BLD AUTO: 0.8 K/UL (ref 0.3–1)
MONOCYTES # BLD AUTO: 0.8 K/UL (ref 0.3–1)
MONOCYTES NFR BLD: 13 % (ref 4–15)
MONOCYTES NFR BLD: 13 % (ref 4–15)
NEUTROPHILS # BLD AUTO: 4.4 K/UL (ref 1.8–7.7)
NEUTROPHILS # BLD AUTO: 4.4 K/UL (ref 1.8–7.7)
NEUTROPHILS NFR BLD: 67.8 % (ref 38–73)
NEUTROPHILS NFR BLD: 67.8 % (ref 38–73)
NRBC BLD-RTO: 0 /100 WBC
NRBC BLD-RTO: 0 /100 WBC
PHOSPHATE SERPL-MCNC: 3.6 MG/DL (ref 2.7–4.5)
PHOSPHATE SERPL-MCNC: 6.1 MG/DL (ref 2.7–4.5)
PLATELET # BLD AUTO: 295 K/UL (ref 150–450)
PLATELET # BLD AUTO: 295 K/UL (ref 150–450)
PMV BLD AUTO: 11.2 FL (ref 9.2–12.9)
PMV BLD AUTO: 11.2 FL (ref 9.2–12.9)
POCT GLUCOSE: 120 MG/DL (ref 70–110)
POCT GLUCOSE: 84 MG/DL (ref 70–110)
POCT GLUCOSE: 87 MG/DL (ref 70–110)
POCT GLUCOSE: 96 MG/DL (ref 70–110)
POCT GLUCOSE: 99 MG/DL (ref 70–110)
POTASSIUM SERPL-SCNC: 3.8 MMOL/L (ref 3.5–5.1)
POTASSIUM SERPL-SCNC: 4.1 MMOL/L (ref 3.5–5.1)
RBC # BLD AUTO: 2.83 M/UL (ref 4–5.4)
RBC # BLD AUTO: 2.83 M/UL (ref 4–5.4)
SODIUM SERPL-SCNC: 134 MMOL/L (ref 136–145)
SODIUM SERPL-SCNC: 139 MMOL/L (ref 136–145)
WBC # BLD AUTO: 6.48 K/UL (ref 3.9–12.7)
WBC # BLD AUTO: 6.48 K/UL (ref 3.9–12.7)

## 2023-05-25 PROCEDURE — 99233 PR SUBSEQUENT HOSPITAL CARE,LEVL III: ICD-10-PCS | Mod: GC,,, | Performed by: INTERNAL MEDICINE

## 2023-05-25 PROCEDURE — D9220A PRA ANESTHESIA: ICD-10-PCS | Mod: CRNA,,, | Performed by: NURSE ANESTHETIST, CERTIFIED REGISTERED

## 2023-05-25 PROCEDURE — 85025 COMPLETE CBC W/AUTO DIFF WBC: CPT | Performed by: HOSPITALIST

## 2023-05-25 PROCEDURE — 99233 SBSQ HOSP IP/OBS HIGH 50: CPT | Mod: GC,,, | Performed by: INTERNAL MEDICINE

## 2023-05-25 PROCEDURE — 25000003 PHARM REV CODE 250: Performed by: STUDENT IN AN ORGANIZED HEALTH CARE EDUCATION/TRAINING PROGRAM

## 2023-05-25 PROCEDURE — 25000003 PHARM REV CODE 250

## 2023-05-25 PROCEDURE — 85730 THROMBOPLASTIN TIME PARTIAL: CPT

## 2023-05-25 PROCEDURE — 84100 ASSAY OF PHOSPHORUS: CPT | Performed by: STUDENT IN AN ORGANIZED HEALTH CARE EDUCATION/TRAINING PROGRAM

## 2023-05-25 PROCEDURE — 99233 SBSQ HOSP IP/OBS HIGH 50: CPT | Mod: ,,, | Performed by: INTERNAL MEDICINE

## 2023-05-25 PROCEDURE — 20600001 HC STEP DOWN PRIVATE ROOM

## 2023-05-25 PROCEDURE — 80100014 HC HEMODIALYSIS 1:1

## 2023-05-25 PROCEDURE — D9220A PRA ANESTHESIA: ICD-10-PCS | Mod: ANES,,, | Performed by: ANESTHESIOLOGY

## 2023-05-25 PROCEDURE — 94761 N-INVAS EAR/PLS OXIMETRY MLT: CPT

## 2023-05-25 PROCEDURE — 63600175 PHARM REV CODE 636 W HCPCS: Performed by: NURSE ANESTHETIST, CERTIFIED REGISTERED

## 2023-05-25 PROCEDURE — 83735 ASSAY OF MAGNESIUM: CPT | Performed by: STUDENT IN AN ORGANIZED HEALTH CARE EDUCATION/TRAINING PROGRAM

## 2023-05-25 PROCEDURE — 25000003 PHARM REV CODE 250: Performed by: HOSPITALIST

## 2023-05-25 PROCEDURE — 25000003 PHARM REV CODE 250: Performed by: NURSE ANESTHETIST, CERTIFIED REGISTERED

## 2023-05-25 PROCEDURE — 63600175 PHARM REV CODE 636 W HCPCS: Performed by: INTERNAL MEDICINE

## 2023-05-25 PROCEDURE — 25000003 PHARM REV CODE 250: Performed by: INTERNAL MEDICINE

## 2023-05-25 PROCEDURE — 80048 BASIC METABOLIC PNL TOTAL CA: CPT | Performed by: STUDENT IN AN ORGANIZED HEALTH CARE EDUCATION/TRAINING PROGRAM

## 2023-05-25 PROCEDURE — 36415 COLL VENOUS BLD VENIPUNCTURE: CPT | Performed by: STUDENT IN AN ORGANIZED HEALTH CARE EDUCATION/TRAINING PROGRAM

## 2023-05-25 PROCEDURE — 99233 PR SUBSEQUENT HOSPITAL CARE,LEVL III: ICD-10-PCS | Mod: ,,, | Performed by: INTERNAL MEDICINE

## 2023-05-25 PROCEDURE — D9220A PRA ANESTHESIA: Mod: ANES,,, | Performed by: ANESTHESIOLOGY

## 2023-05-25 PROCEDURE — D9220A PRA ANESTHESIA: Mod: CRNA,,, | Performed by: NURSE ANESTHETIST, CERTIFIED REGISTERED

## 2023-05-25 RX ORDER — SODIUM CHLORIDE 9 MG/ML
INJECTION, SOLUTION INTRAVENOUS
Status: CANCELLED | OUTPATIENT
Start: 2023-05-25

## 2023-05-25 RX ORDER — HEPARIN SODIUM 1000 [USP'U]/ML
1000 INJECTION, SOLUTION INTRAVENOUS; SUBCUTANEOUS
Status: CANCELLED | OUTPATIENT
Start: 2023-05-25 | End: 2023-05-26

## 2023-05-25 RX ORDER — FENTANYL CITRATE 50 UG/ML
INJECTION, SOLUTION INTRAMUSCULAR; INTRAVENOUS
Status: DISCONTINUED | OUTPATIENT
Start: 2023-05-25 | End: 2023-05-25

## 2023-05-25 RX ORDER — MUPIROCIN 20 MG/G
OINTMENT TOPICAL 2 TIMES DAILY
Status: DISCONTINUED | OUTPATIENT
Start: 2023-05-25 | End: 2023-05-29 | Stop reason: HOSPADM

## 2023-05-25 RX ORDER — SODIUM CHLORIDE 9 MG/ML
INJECTION, SOLUTION INTRAVENOUS ONCE
Status: CANCELLED | OUTPATIENT
Start: 2023-05-25 | End: 2023-05-25

## 2023-05-25 RX ORDER — MIDAZOLAM HYDROCHLORIDE 1 MG/ML
INJECTION, SOLUTION INTRAMUSCULAR; INTRAVENOUS
Status: DISCONTINUED | OUTPATIENT
Start: 2023-05-25 | End: 2023-05-25

## 2023-05-25 RX ADMIN — ISOSORBIDE DINITRATE 40 MG: 20 TABLET ORAL at 09:05

## 2023-05-25 RX ADMIN — SODIUM CHLORIDE: 0.9 INJECTION, SOLUTION INTRAVENOUS at 01:05

## 2023-05-25 RX ADMIN — ALLOPURINOL 50 MG: 300 TABLET ORAL at 10:05

## 2023-05-25 RX ADMIN — MIDAZOLAM HYDROCHLORIDE 1 MG: 1 INJECTION, SOLUTION INTRAMUSCULAR; INTRAVENOUS at 01:05

## 2023-05-25 RX ADMIN — CARVEDILOL 6.25 MG: 6.25 TABLET, FILM COATED ORAL at 10:05

## 2023-05-25 RX ADMIN — HYDRALAZINE HYDROCHLORIDE 75 MG: 50 TABLET ORAL at 10:05

## 2023-05-25 RX ADMIN — Medication 6 MG: at 09:05

## 2023-05-25 RX ADMIN — SERTRALINE HYDROCHLORIDE 50 MG: 50 TABLET ORAL at 10:05

## 2023-05-25 RX ADMIN — FAMOTIDINE 20 MG: 20 TABLET ORAL at 10:05

## 2023-05-25 RX ADMIN — ATORVASTATIN CALCIUM 80 MG: 40 TABLET, FILM COATED ORAL at 10:05

## 2023-05-25 RX ADMIN — FENTANYL CITRATE 50 MCG: 50 INJECTION, SOLUTION INTRAMUSCULAR; INTRAVENOUS at 12:05

## 2023-05-25 RX ADMIN — SEVELAMER CARBONATE 800 MG: 800 TABLET, FILM COATED ORAL at 10:05

## 2023-05-25 RX ADMIN — TORSEMIDE 20 MG: 20 TABLET ORAL at 10:05

## 2023-05-25 RX ADMIN — HEPARIN SODIUM 1000 UNITS: 1000 INJECTION, SOLUTION INTRAVENOUS; SUBCUTANEOUS at 06:05

## 2023-05-25 RX ADMIN — ISOSORBIDE DINITRATE 40 MG: 20 TABLET ORAL at 10:05

## 2023-05-25 RX ADMIN — HEPARIN SODIUM 1800 UNITS: 1000 INJECTION, SOLUTION INTRAVENOUS; SUBCUTANEOUS at 01:05

## 2023-05-25 RX ADMIN — HYDRALAZINE HYDROCHLORIDE 75 MG: 50 TABLET ORAL at 09:05

## 2023-05-25 RX ADMIN — CALCIUM ACETATE 1334 MG: 667 CAPSULE ORAL at 10:05

## 2023-05-25 RX ADMIN — FENTANYL CITRATE 50 MCG: 50 INJECTION, SOLUTION INTRAMUSCULAR; INTRAVENOUS at 01:05

## 2023-05-25 RX ADMIN — CALCITRIOL CAPSULES 0.25 MCG 0.5 MCG: 0.25 CAPSULE ORAL at 10:05

## 2023-05-25 RX ADMIN — CARVEDILOL 6.25 MG: 6.25 TABLET, FILM COATED ORAL at 09:05

## 2023-05-25 NOTE — CONSULTS
NIAS consulted to insert a PIV but when the patient was assessed an IV wasn't needed d/t floor nurse insertion

## 2023-05-25 NOTE — PROGRESS NOTES
[facility] - Cardiology Stepdown  Nephrology  Progress Note    Patient Name: Suyapa Connelly  MRN: [id number]  Admission Date: 5/1/2023  Hospital Length of Stay: 22 days  Attending Provider: Ruma Schulte MD   Primary Care Physician: Magen Christensen MD  Principal Problem:Acute on chronic combined systolic and diastolic heart failure    Subjective:     HPI: 57 y.o. female with past medical history of CAD s/p CABG, CKD, DM, HTN, HLD, s/p bilateral AKA who presnets with chest pain, difficulty breathing, leg swelling. Her most recent ECHO revealed severe systolic dysfunction with EF of 15%. She has been started on diuretics with IV lasix and PO metolazone with adequate urinary response however her renal function has been deteriorating throughout the admission. She reports requiring dialysis at one time before, around the time she underwent her AKAs. Her baseline serum creatinine is in the 2s and it has trended up to 5 during this admission. Nephrology consulted for further evaluation of ROSLYN on CKD.         Interval History:     No acute events overnight. Patient more somnolent and confused this AM. Scheduled for perm cath placement today with IR. Plan for iHD after.     Review of patient's allergies indicates:   Allergen Reactions    Ciprofloxacin Itching    Contrast media      Kidney injury    Iodine      Kidney injury     Current Facility-Administered Medications   Medication Frequency    acetaminophen tablet 1,000 mg Q8H PRN    allopurinol split tablet 50 mg Every other day    atorvastatin tablet 80 mg Daily    calcitRIOL capsule 0.5 mcg Daily    calcium acetate(phosphat bind) capsule 1,334 mg TID WM    carvediloL tablet 6.25 mg BID    dextrose 10% bolus 125 mL 125 mL PRN    dextrose 10% bolus 250 mL 250 mL PRN    dextrose 40 % gel 15,000 mg PRN    dextrose 40 % gel 30,000 mg PRN    famotidine tablet 20 mg Daily    glucagon (human recombinant) injection 1 mg PRN    heparin (porcine) injection 1,000  Units PRN    hydrALAZINE tablet 75 mg TID    HYDROmorphone tablet 2 mg BID PRN    insulin aspart U-100 pen 0-5 Units QID (AC + HS) PRN    insulin detemir U-100 (Levemir) pen 4 Units QHS    isosorbide dinitrate tablet 40 mg TID    melatonin tablet 6 mg Nightly PRN    naloxone 0.4 mg/mL injection 0.02 mg PRN    ondansetron disintegrating tablet 8 mg Q8H PRN    sertraline tablet 50 mg Daily    sevelamer carbonate tablet 800 mg TID WM    sodium chloride 0.9% bolus 250 mL 250 mL PRN    sodium chloride 0.9% flush 10 mL PRN    torsemide tablet 20 mg Daily       Objective:     Vital Signs (Most Recent):  Temp: 98.1 °F (36.7 °C) (05/25/23 0810)  Pulse: 80 (05/25/23 0810)  Resp: 14 (05/25/23 0810)  BP: 122/65 (05/25/23 0810)  SpO2: 96 % (05/25/23 0810) Vital Signs (24h Range):  Temp:  [97.8 °F (36.6 °C)-98.4 °F (36.9 °C)] 98.1 °F (36.7 °C)  Pulse:  [76-87] 80  Resp:  [14-22] 14  SpO2:  [90 %-96 %] 96 %  BP: (115-129)/(58-65) 122/65     Weight: 99.2 kg (218 lb 11.1 oz) (05/25/23 0400)  Body mass index is 36.39 kg/m².  Body surface area is 2.13 meters squared.    I/O last 3 completed shifts:  In: 600 [P.O.:600]  Out: -      Physical Exam  Constitutional:       General: She is not in acute distress.  HENT:      Right Ear: External ear normal.      Left Ear: External ear normal.      Nose: No congestion or rhinorrhea.      Mouth/Throat:      Mouth: Mucous membranes are moist.   Cardiovascular:      Rate and Rhythm: Normal rate and regular rhythm.      Pulses: Normal pulses.      Heart sounds: Normal heart sounds.   Pulmonary:      Effort: No respiratory distress.      Breath sounds: Normal breath sounds. No wheezing or rales.   Abdominal:      General: There is no distension.      Tenderness: There is no abdominal tenderness. There is no rebound.   Musculoskeletal:      Cervical back: No rigidity or tenderness.      Right lower leg: Edema present.      Left lower leg: Edema present.      Comments: Bilateral lower  extremity amputation, pitting edema present   Neurological:      Mental Status: She is disoriented.      Comments: Asterixis present        Significant Labs:  All labs within the past 24 hours have been reviewed.     Significant Imaging:  Labs: Reviewed    Assessment/Plan:     Cardiac/Vascular  * Acute on chronic combined systolic and diastolic heart failure  Management per primary       S/P CABG x 1  Management per primary       Renal/  Chronic kidney disease-mineral and bone disorder  Mineral Bone Disease  Lab Results   Component Value Date    .5 (H) 05/16/2023    CALCIUM 9.2 05/25/2023    CAION 0.92 (L) 05/17/2023    PHOS 6.1 (H) 05/25/2023       - Cont calcitriol 0.5mg  - Cont calcium acetate 1334mg  - Stop sevelamer 800mg TID.   - Renal diet with protein intake goal 1.5 g/kg/d with 1 L fluid restriction   - Novasource with meals  - Daily renal panel so that phos and albumin is monitored daily.       ROSLYN (acute kidney injury)  - ROSLYN on CKD, likely ischemic, CRS and diuresis  - Baseline serum creatinine in the 2s, up to 3.9 on admission; up to 7.9 on her most recent labs  - Retroperitoneal US with no hydronephrosis    - ECHO with severe systolic dysfunction, EF of 15%    - RHC with PCWP of 30   - patient with increase confusion, showing some mild signs of uremia.   - discussion held with family who are in agreement with starting patient on dialysis.   Plan:  - Perm cath placement today with IR. Plan for iHD today first session. (250/500; 2 hours; r300; 500cc UF).    -  consult for HD chair placement.   - renally dose all meds  - Avoid nephrotoxic agents as much as possible    - cont torsemide 20 mg QD    Uremia  See ROSLYN    Oncology  Anemia    Recent Labs   Lab 05/23/23  1710 05/24/23  0906 05/25/23  0452   WBC 6.09 7.07  7.07 6.48  6.48   HGB 8.5* 8.6*  8.6* 8.5*  8.5*   HCT 28.2* 30.9*  30.9* 28.4*  28.4*    242  242 295  295     Lab Results   Component Value Date     FESATURATED 24 02/04/2022    FERRITIN 1,103 (H) 02/04/2022     - Goal is Hgb of 10-11.   - Obtain iron studies in AM   - plan for EPO with dialysis.             Thank you for your consult. I will follow-up with patient. Please contact us if you have any additional questions.     Case discussed with attending. Attestation to follow.       Jay Qureshi,   Nephrology  [facility] - [facility]iology Stepdown

## 2023-05-25 NOTE — ASSESSMENT & PLAN NOTE
- ROSLYN on CKD, likely ischemic, CRS and diuresis  - Baseline serum creatinine in the 2s, up to 3.9 on admission; up to 7.9 on her most recent labs  - Retroperitoneal US with no hydronephrosis    - ECHO with severe systolic dysfunction, EF of 15%    - RHC with PCWP of 30   - patient with increase confusion, showing some mild signs of uremia.   - discussion held with family who are in agreement with starting patient on dialysis.   Plan:  - Perm cath placement today with IR. Plan for iHD today first session. (250/500; 2 hours; r300; 500cc UF).    -  consult for HD chair placement.   - renally dose all meds  - Avoid nephrotoxic agents as much as possible    - cont torsemide 20 mg QD

## 2023-05-25 NOTE — ASSESSMENT & PLAN NOTE
Mineral Bone Disease  Lab Results   Component Value Date    .5 (H) 05/16/2023    CALCIUM 9.2 05/25/2023    CAION 0.92 (L) 05/17/2023    PHOS 6.1 (H) 05/25/2023       - Cont calcitriol 0.5mg  - Cont calcium acetate 1334mg  - Stop sevelamer 800mg TID.   - Renal diet with protein intake goal 1.5 g/kg/d with 1 L fluid restriction   - Novasource with meals  - Daily renal panel so that phos and albumin is monitored daily.

## 2023-05-25 NOTE — ANESTHESIA PREPROCEDURE EVALUATION
05/25/2023  Suyapa Connelly is a 57 y.o., female.      Pre-op Assessment    I have reviewed the Patient Summary Reports.     I have reviewed the Nursing Notes. I have reviewed the NPO Status.   I have reviewed the Medications.     Review of Systems  Anesthesia Hx:  No problems with previous Anesthesia  History of prior surgery of interest to airway management or planning: Denies Family Hx of Anesthesia complications.   Denies Personal Hx of Anesthesia complications.   Hematology/Oncology:  Hematology Normal   Oncology Normal     EENT/Dental:EENT/Dental Normal   Cardiovascular:   Exercise tolerance: good Hypertension Valvular problems/Murmurs, MR Past MI CAD  CABG/stent  CHF PVD hyperlipidemia ECG has been reviewed. TTE 5/23:  ? The left ventricle is mildly enlarged with severely decreased systolic function. The estimated ejection fraction is 15%.  ? There is severe left ventricular global hypokinesis.  ? Normal right ventricular size with low normal right ventricular systolic function.  ? Grade I left ventricular diastolic dysfunction.  ? Biatrial enlargement.  ? Mild-to-moderate mitral regurgitation.  ? Severe tricuspid regurgitation.  ? The estimated PA systolic pressure is 43 mmHg.  ? Elevated central venous pressure (15 mmHg).   Pulmonary:  Pulmonary Normal    Renal/:   Chronic Renal Disease, ESRD    Hepatic/GI:   GERD    Musculoskeletal:  Musculoskeletal Normal    Neurological:  Neurology Normal    Endocrine:   Diabetes, type 2  Obesity / BMI > 30  Dermatological:  Skin Normal    Psych:   anxiety depression          Physical Exam  General: Well nourished, Cooperative, Alert and Oriented    Airway:  Mallampati: II   Mouth Opening: Normal  TM Distance: Normal  Tongue: Normal  Neck ROM: Normal ROM    Dental:  Intact        Anesthesia Plan  Type of Anesthesia, risks & benefits discussed:    Anesthesia  Type: Gen Natural Airway, MAC  Intra-op Monitoring Plan: Standard ASA Monitors  Induction:  IV  Informed Consent: Informed consent signed with the Patient and all parties understand the risks and agree with anesthesia plan.  All questions answered.   ASA Score: 4  Day of Surgery Review of History & Physical: H&P Update referred to the surgeon/provider.    Ready For Surgery From Anesthesia Perspective.     .

## 2023-05-25 NOTE — H&P
Vascular and Interventional Radiology History & Physical    Date:  5/25/2023    Chief Complaint:   CKD    History of Present Illness:  Suyapa Connelly is a 57 y.o. female with a past medical history of ROSLYN with CKD IV, CAD with CABG, heart failure with most recent EF 15%, Atrial fibrillation om eliquis at home, and above knee amputation with PVD who was admitted on 5/1/23 for heart failure.       Interventional Radiology has been consulted for tunneled catheter placement for dialysis. Patient was scheduled to have the tunneled dialysis catheter placed with interventional nephrology on 5/23/23 but was unable to have it placed due to scheduling.    Past Medical History:  Past Medical History:   Diagnosis Date    Anxiety     Chronic pain syndrome     CKD (chronic kidney disease), stage III     Depression     Diabetes mellitus     type 2    Diabetes mellitus, type 2     GERD (gastroesophageal reflux disease)     Hyperemesis 3/23/2021    Hyperlipemia     Hypertension     Hypokalemia 3/23/2021    Infection of below knee amputation stump 3/12/2022    Myocardial infarction 2010    minor-caused by stress per pt.    Osteomyelitis     Osteomyelitis of left foot 4/30/2021    PVD (peripheral vascular disease)     Ulcer of left foot     Vaginal delivery     x1       Past Surgical History:  Past Surgical History:   Procedure Laterality Date    ABOVE-KNEE AMPUTATION Left 5/18/2021    Procedure: AMPUTATION, ABOVE KNEE;  Surgeon: Teddy Huber MD;  Location: [facility] OR [address] [place] [address];  Service: Vascular;  Laterality: Left;    ABOVE-KNEE AMPUTATION Right 3/18/2022    Procedure: AMPUTATION, ABOVE KNEE;  Surgeon: DAYNE Florez II, MD;  Location: [facility] OR [address] [place] [address];  Service: Vascular;  Laterality: Right;    Angiogram - Right Extremity Right 7/9/15    angiogram-left leg  10/6/15    ANGIOGRAPHY OF LOWER EXTREMITY Left 4/29/2021    Procedure: ANGIOGRAM, LOWER EXTREMITY;  Surgeon: Teddy Huber MD;  Location: [facility] OR [address] [place] [address];   Service: Vascular;  Laterality: Left;    BELOW KNEE AMPUTATION OF LOWER EXTREMITY Right 12/28/2021    Procedure: AMPUTATION, BELOW KNEE;  Surgeon: Kaitlyn Rojas MD;  Location: [facility];  Service: General;  Laterality: Right;    CATHETERIZATION OF BOTH LEFT AND RIGHT HEART N/A 12/18/2019    Procedure: CATHETERIZATION, HEART, BOTH LEFT AND RIGHT;  Surgeon: Que Fernando III, MD;  Location: [facility] CATH LAB;  Service: Cardiology;  Laterality: N/A;    CORONARY ANGIOGRAPHY N/A 12/18/2019    Procedure: ANGIOGRAM, CORONARY ARTERY;  Surgeon: Que Fernando III, MD;  Location: [facility] CATH LAB;  Service: Cardiology;  Laterality: N/A;    CORONARY ANGIOGRAPHY INCLUDING BYPASS GRAFTS WITH CATHETERIZATION OF LEFT HEART N/A 7/28/2020    Procedure: ANGIOGRAM, CORONARY, INCLUDING BYPASS GRAFT, WITH LEFT HEART CATHETERIZATION, 9 am;  Surgeon: Rachel Easley MD;  Location: [facility] CATH LAB;  Service: Cardiology;  Laterality: N/A;    CORONARY ARTERY BYPASS GRAFT (CABG) N/A 1/14/2020    Procedure: CORONARY ARTERY BYPASS GRAFT (CABG) x 1     Off Pump;  Surgeon: Huang Altamirano MD;  Location: [address];  Service: Cardiovascular;  Laterality: N/A;    CREATION OF FEMORAL-TIBIAL ARTERY BYPASS Left 4/29/2021    Procedure: CREATION, BYPASS, ARTERIAL, FEMORAL TO ANTERIOR TIBIAL;  Surgeon: Teddy Huber MD;  Location: [address];  Service: Vascular;  Laterality: Left;    CREATION OF FEMOROPOPLITEAL ARTERIAL BYPASS USING GRAFT Left 8/18/2020    Procedure: CREATION, BYPASS, ARTERIAL, FEMORAL TO POPLITEAL, USING GRAFT, LEFT LOWER EXTREMITY;  Surgeon: Teddy Huber MD;  Location: [facility];  Service: Vascular;  Laterality: Left;  REQUEST 7:15 A.M. START----COVID NEGATIVE ON 8/17  1ST CASE STARTE PER LEANA ON 8/7/2020 @ 942AM-LO  RN PREOP 8/12/2020   T/S-----CLEARED BY CARDS-------PENDING INSURANCE    DEBRIDEMENT OF FOOT Left 9/8/2020    Procedure: DEBRIDEMENT, FOOT;  Surgeon: Rosio Mayes DPM;  Location: [facility]  OR;  Service: Podiatry;  Laterality: Left;  request neoxx .   RN Pre Op 9-4-2020, Covid negative on 9/5/20. C A    DEBRIDEMENT OF FOOT  3/4/2021    Procedure: DEBRIDEMENT, FOOT;  Surgeon: Teddy Huber MD;  Location: [facility] OR;  Service: Vascular;;    DEBRIDEMENT OF FOOT Left 3/9/2021    Procedure: DEBRIDEMENT, FOOT, bone biopsy;  Surgeon: Rosio Mayes DPM;  Location: [facility] OR;  Service: Podiatry;  Laterality: Left;  Request neoxx---COVID IN AM  REQUESTING NOON START  RN Phone Pre op.On Blood thinners Plavix and Eliquis.  Covid am of surgery. C A    DEBRIDEMENT OF FOOT Left 5/4/2021    Procedure: DEBRIDEMENT, FOOT;  Surgeon: Farooq Morley DPM;  Location: [facility] 2ND FLR;  Service: Podiatry;  Laterality: Left;    INSERTION OF TUNNELED CENTRAL VENOUS HEMODIALYSIS CATHETER N/A 1/27/2020    Procedure: Insertion, Catheter, Central Venous, Hemodialysis;  Surgeon: ESTEBAN Gomez III, MD;  Location: [facility] CATH LAB;  Service: Peripheral Vascular;  Laterality: N/A;    INSERTION OF TUNNELED CENTRAL VENOUS HEMODIALYSIS CATHETER  5/10/2023    Procedure: Insertion, Catheter, Central Venous, Hemodialysis;  Surgeon: Romulo Queen MD;  Location: [facility] CATH LAB;  Service: Cardiology;;    PERCUTANEOUS TRANSLUMINAL ANGIOPLASTY N/A 3/4/2021    Procedure: PTA (ANGIOPLASTY, PERCUTANEOUS, TRANSLUMINAL);  Surgeon: Teddy Huber MD;  Location: [facility] OR;  Service: Vascular;  Laterality: N/A;    REMOVAL OF ARTERIOVENOUS GRAFT Left 5/27/2021    Procedure: REMOVAL, GRAFT, LEFT LOWER EXTREMITY, WOUND EXPLORATION;  Surgeon: Teddy Huber MD;  Location: [facility] 2ND FLR;  Service: Vascular;  Laterality: Left;    REMOVAL OF NAIL OF DIGIT Left 3/9/2021    Procedure: REMOVAL, NAIL, DIGIT;  Surgeon: Rosio Mayes DPM;  Location: [facility] OR;  Service: Podiatry;  Laterality: Left;    RIGHT HEART CATHETERIZATION Right 5/10/2023    Procedure: INSERTION, CATHETER, RIGHT HEART;  Surgeon: Romulo Queen MD;  Location: [facility] CATH  LAB;  Service: Cardiology;  Laterality: Right;    THROMBECTOMY Left 3/4/2021    Procedure: THROMBECTOMY, LEFT LOWER EXTREMITY BYPASS GRAFT, ANGIOGRAM, POSSIBLE INTERVENTION, POSSIBLE LEFT LOWER EXTREMITY BYPASS;  Surgeon: Teddy Huber MD;  Location: [facility] OR;  Service: Vascular;  Laterality: Left;    THROMBECTOMY Left 4/29/2021    Procedure: GRAFT THROMBECTOMY, LEFT LOWER EXTREMITY;  Surgeon: Teddy Huber MD;  [facility];  Service: Vascular;  Laterality: Left;  14.5 min  1179.85 mGy  341.01 Gycm2  240 ml dye    THROMBECTOMY  10/22/2021    Procedure: THROMBECTOMY;  Surgeon: Saad Arenas MD;  Location: [facility] CATH LAB/EP;  Service: Cardiology;;        Sedation History:    Denies any adverse reactions.  Denies problems laying flat.    Social History:  Social History     Tobacco Use    Smoking status: Former    Smokeless tobacco: Never   Substance Use Topics    Alcohol use: No    Drug use: Yes     Types: Marijuana     Comment: occassional        Home Medications:   Prior to Admission medications    Medication Sig Start Date End Date Taking? Authorizing Provider   allopurinoL (ZYLOPRIM) 100 MG tablet Take 0.5 tablets (50 mg total) by mouth every other day. 5/25/23 5/19/24  Clem Mcintyre MD   apixaban (ELIQUIS) 5 mg Tab Take 1 tablet (5 mg total) by mouth 2 (two) times daily. 5/23/23 5/22/24  Clem Mcintyre MD   atorvastatin (LIPITOR) 80 MG tablet Take 1 tablet (80 mg total) by mouth once daily. 5/3/23 5/2/24  Flavia Delgado MD   calcitRIOL (ROCALTROL) 0.5 MCG Cap Take 1 capsule (0.5 mcg total) by mouth once daily. 5/24/23 5/23/24  Clem Mcintyre MD   calcium acetate,phosphat bind, (PHOSLO) 667 mg capsule Take 2 capsules (1,334 mg total) by mouth 3 (three) times daily with meals. 5/23/23 5/22/24  Clem Mcintyre MD   carvediloL (COREG) 6.25 MG tablet Take 1 tablet (6.25 mg total) by mouth 2 (two) times daily. 5/23/23 5/22/24  Clem Mcintyre MD   clopidogreL (PLAVIX) 75 mg tablet Take  "1 tablet (75 mg total) by mouth once daily. 5/3/23 5/2/24  Flavia Delgado MD   famotidine (PEPCID) 20 MG tablet Take 1 tablet (20 mg total) by mouth once daily. 5/24/23 5/23/24  Clem Mcintyre MD   hydrALAZINE (APRESOLINE) 25 MG tablet Take 3 tablets (75 mg total) by mouth 3 (three) times daily. 5/23/23 5/22/24  Clem Mcintyre MD   insulin detemir U-100, Levemir, 100 unit/mL (3 mL) SubQ InPn pen Inject 8 Units into the skin every evening. 5/23/23 5/22/24  Clem Mcintyre MD   isosorbide dinitrate (ISORDIL) 20 MG tablet Take 2 tablets (40 mg total) by mouth 3 (three) times daily. 5/23/23 5/22/24  Clem Mcintyre MD   pen needle, diabetic 32 gauge x 5/32" Ndle Use to inject insulin once daily 5/23/23   Clem Mcintyre MD   sertraline (ZOLOFT) 50 MG tablet Take 1 tablet (50 mg total) by mouth once daily. 5/3/23 5/2/24  Flavia Delgado MD   torsemide (DEMADEX) 20 MG Tab Take 1 tablet (20 mg total) by mouth once daily. 5/23/23 5/22/24  Clem Mcintyre MD   amitriptyline (ELAVIL) 50 MG tablet Take 1 tablet (50 mg total) by mouth every evening. 5/23/23 5/25/23  Clem Mcintyre MD   lancing device Misc 1 Device by Misc.(Non-Drug; Combo Route) route 2 (two) times daily with meals. 5/7/18 2/24/21  Rosales Barton MD   sodium bicarbonate 650 MG tablet Take 2 tablets (1,300 mg total) by mouth 3 (three) times daily. 5/2/23 5/25/23  Flavia Delgado MD       Inpatient Medications:    Current Facility-Administered Medications:     acetaminophen tablet 1,000 mg, 1,000 mg, Oral, Q8H PRN, Chandrakant Oakley MD, 1,000 mg at 05/19/23 2347    allopurinol split tablet 50 mg, 50 mg, Oral, Every other day, Batsheva HammondsmDO, 50 mg at 05/25/23 1039    atorvastatin tablet 80 mg, 80 mg, Oral, Daily, Chandrakant Oakley MD, 80 mg at 05/25/23 1039    calcitRIOL capsule 0.5 mcg, 0.5 mcg, Oral, Daily, Sy Crisostomo MD, 0.5 mcg at 05/25/23 1039    calcium acetate(phosphat bind) capsule 1,334 mg, 1,334 mg, Oral, TID WM, Batsheva Hammondsm DO, 1,334 mg at " 05/25/23 1038    carvediloL tablet 6.25 mg, 6.25 mg, Oral, BID, Chandrakant Oakley MD, 6.25 mg at 05/25/23 1039    dextrose 10% bolus 125 mL 125 mL, 12.5 g, Intravenous, PRN, Chandrakant Oakley MD    dextrose 10% bolus 250 mL 250 mL, 25 g, Intravenous, PRN, Chandrakant Oakley MD    dextrose 40 % gel 15,000 mg, 15 g, Oral, PRN, Chandrakant Oakley MD    dextrose 40 % gel 30,000 mg, 30 g, Oral, PRN, Chandrakant Oakley MD    famotidine tablet 20 mg, 20 mg, Oral, Daily, Batsheva Sleem, DO, 20 mg at 05/25/23 1039    glucagon (human recombinant) injection 1 mg, 1 mg, Intramuscular, PRN, Chandrakant Oakley MD    heparin (porcine) injection 1,000 Units, 1,000 Units, Intra-Catheter, PRN, Jeremy Juan MD    hydrALAZINE tablet 75 mg, 75 mg, Oral, TID, Anna Lopez MD, 75 mg at 05/25/23 1039    HYDROmorphone tablet 2 mg, 2 mg, Oral, BID PRN, Chandrakant Oakley MD, 2 mg at 05/23/23 1025    insulin aspart U-100 pen 0-5 Units, 0-5 Units, Subcutaneous, QID (AC + HS) PRN, Chandrakant Oakley MD, 2 Units at 05/13/23 0001    insulin detemir U-100 (Levemir) pen 4 Units, 4 Units, Subcutaneous, QHS, Clem Mcintyre MD, 4 Units at 05/24/23 2157    isosorbide dinitrate tablet 40 mg, 40 mg, Oral, TID, Anna Lopez MD, 40 mg at 05/25/23 1038    melatonin tablet 6 mg, 6 mg, Oral, Nightly PRN, Norman Mackey MD, 6 mg at 05/24/23 2152    mupirocin 2 % ointment, , Nasal, BID, Jay Qureshi,     naloxone 0.4 mg/mL injection 0.02 mg, 0.02 mg, Intravenous, PRN, Chandrakant Oakley MD    ondansetron disintegrating tablet 8 mg, 8 mg, Oral, Q8H PRN, Edison Shipman MD    sertraline tablet 50 mg, 50 mg, Oral, Daily, Chandrakant Oakley MD, 50 mg at 05/25/23 1039    sodium chloride 0.9% bolus 250 mL 250 mL, 250 mL, Intravenous, PRN, Jeremy Juan MD    sodium chloride 0.9% flush 10 mL, 10 mL, Intravenous, PRN, Juan Bowman MD    torsemide tablet 20 mg, 20 mg, Oral, Daily, Norman Mackey MD, 20 mg at 05/25/23 1038     Anticoagulants/Antiplatelets:    eliquis    Allergies:   Review of patient's allergies indicates:   Allergen Reactions    Ciprofloxacin Itching    Contrast media      Kidney injury    Iodine      Kidney injury       Review of Systems:   As documented in primary provider H&P.    Vital Signs (Most Recent):  Temp: 98.1 °F (36.7 °C) (05/25/23 0810)  Pulse: 80 (05/25/23 0810)  Resp: 14 (05/25/23 0810)  BP: 122/65 (05/25/23 0810)  SpO2: 96 % (05/25/23 0810)    Physical Exam:  No acute distress, laying comfortably in bed, pleasant and cooperative  Regular rate and rhythm  Breathing unlabored  Abdomen benign  Extremities warm and well perfused    Sedation Exam:  ASA: Per anesthesia    Mallampati: Per anesthesia      Laboratory:  Lab Results   Component Value Date    INR 1.1 05/23/2023       Lab Results   Component Value Date    WBC 6.48 05/25/2023    WBC 6.48 05/25/2023    HGB 8.5 (L) 05/25/2023    HGB 8.5 (L) 05/25/2023    HCT 28.4 (L) 05/25/2023    HCT 28.4 (L) 05/25/2023     (H) 05/25/2023     (H) 05/25/2023     05/25/2023     05/25/2023      Lab Results   Component Value Date     (H) 05/25/2023     (L) 05/25/2023    K 4.1 05/25/2023    CL 94 (L) 05/25/2023    CO2 26 05/25/2023     (H) 05/25/2023    CREATININE 7.8 (H) 05/25/2023    CALCIUM 9.2 05/25/2023    MG 2.2 05/25/2023    ALT 6 (L) 05/18/2023    AST 25 05/18/2023    ALBUMIN 2.3 (L) 05/18/2023    BILITOT 0.3 05/18/2023    BILIDIR 0.2 06/07/2021       Imaging:  Reviewed.    ASSESSMENT/PLAN:                     Sedation Plan: Per anesthesia  Patient will undergo: Tunneled catheter placement for dialysis. Given EF 15%, patient will require anesthesia monitoring per IR nurses.    Teddy Aldana MD  Radiology PGY-5

## 2023-05-25 NOTE — PROGRESS NOTES
[facility] - Cardiology Stepdown  Adult Nutrition  Progress Note    SUMMARY       Recommendations    1. When diet is advanced, consider liberalizing to regular diet (d/t decreased intake)    2. Continue Optisource for optimization of protein and calorie intake     3. RD following    Goals: Meet % of EEN/EPN by RD f/u date  Nutrition Goal Status: goal not met  Communication of RD Recs: other (comment)    Assessment and Plan    Nutrition Problem  Inadequate energy intake      Related to (etiology):   Physiological demands      Signs and Symptoms (as evidenced by):   25% meal consumption      Interventions(treatment strategy):  Collaboration of nutrition care w/ other providers   ONS     Nutrition Diagnosis Status:   Ongoing       Reason for Assessment    Reason For Assessment: RD follow-up  Diagnosis: cardiac disease  Relevant Medical History: GERD, HTN, DM type 2  Interdisciplinary Rounds: did not attend    General Information Comments:   5/25/23: RD follow up. Spoke with caregiver at bedside. Patient not in room when RD visited. Unable to perform NFPE. Last NFPE completed 5/17/23. Patient NPO at this time for procedure. Prior to NPO status, caregiver reports patient was eating ok. Tolerating solids. Reports no N/V/D/C. Denies C/S difficulties. Weight trending up since admit. +8.9 kg (~20 lbs) x 24 days (~6 lbs gain per week). Fluid status possibly contributing to weight fluctuations. LBM 5/24. Patient receiving renal diet with diabetic and 1500 mL fluid modifiers prior to NPO status. Per flow sheet documentation patient consuming ~0-50% of meals. Caregiver unsure if patient drinking ONS. LBM noted 5/24 per flow sheet.      5/17/23:  RD triggered for LOS. Spoke w/ pt at bedside, pt reports a poor appetite at this time, consuming about 25% meal consumption, some meals 50-75% meal consumption, depending on what is.. Pt is receiving Boost however not drinking as she states it causes her diarrhea. RD encouraged  "adequate po intake at bedside. NFPE completed 5/17/23, pt is at risk for malnutrition if po intake continues to decline. LBM noted-5/14/23  Nutrition Discharge Planning: pending clinical course    Nutrition Risk Screen    Nutrition Risk Screen: no indicators present    Nutrition/Diet History    Spiritual, Cultural Beliefs, Tenriism Practices, Values that Affect Care: no  Food Allergies: NKFA    Anthropometrics    Temp: 97.6 °F (36.4 °C)  Height: 5' 5" (165.1 cm)  Height (inches): 65 in  Weight Method: Bed Scale  Weight: 99.2 kg (218 lb 11.1 oz)  Weight (lb): 218.7 lb  Ideal Body Weight (IBW), Female: 125 lb  % Ideal Body Weight, Female (lb): 168 %  BMI (Calculated): 36.4  BMI Grade: 35 - 39.9 - obesity - grade II       Lab/Procedures/Meds    Pertinent Labs Reviewed: reviewed  Pertinent Labs Comments: Na 134, Cl 94, , Crt 7.8, GFR 5.6, Glu 112, P 6.1, RBC 2.83, H/H 8.528.4,   Pertinent Medications Reviewed: reviewed  Pertinent Medications Comments: atorvastatin, calcitriol, Ca acetate, famotidine, insulin    Estimated/Assessed Needs    Weight Used For Calorie Calculations: 97.8 kg (215 lb 9.8 oz)  Energy Calorie Requirements (kcal): 1956  Energy Need Method: Kcal/kg (20 kcal/kg)  Protein Requirements: 117 g (1.2 g/kg)  Weight Used For Protein Calculations: 97.8 kg (215 lb 9.8 oz)        RDA Method (mL): 1956         Nutrition Prescription Ordered    Current Diet Order: NPO  Nutrition Order Comments: rTaci CABALLERO    Evaluation of Received Nutrient/Fluid Intake    I/O: -11.6 L since 5/11  Energy Calories Required: not meeting needs  Protein Required: not meeting needs  Fluid Required: not meeting needs  Total Fluid Intake (mL/kg): 1 ml or fluid per MD  Tolerance: tolerating  % Intake of Estimated Energy Needs: 25 - 50 %  % Meal Intake: 25 - 50 %    Nutrition Risk    Level of Risk/Frequency of Follow-up: low ((1x/week))     Monitor and Evaluation    Food and Nutrient Intake: energy intake, food and " beverage intake  Food and Nutrient Adminstration: diet order  Knowledge/Beliefs/Attitudes: food and nutrition knowledge/skill, beliefs and attitudes  Physical Activity and Function: nutrition-related ADLs and IADLs, factors affecting access to physical activity  Anthropometric Measurements: height/length, weight, weight change, body mass index, growth pattern indices/percentile ranks  Biochemical Data, Medical Tests and Procedures: electrolyte and renal panel, gastrointestinal profile, inflammatory profile, glucose/endocrine profile, lipid profile  Nutrition-Focused Physical Findings: overall appearance, extremities, muscles and bones, head and eyes, skin     Nutrition Follow-Up    RD Follow-up?: Yes

## 2023-05-25 NOTE — SUBJECTIVE & OBJECTIVE
Interval History:     No acute events overnight. Patient more somnolent and confused this AM. Scheduled for perm cath placement today with IR. Plan for iHD after.     Review of patient's allergies indicates:   Allergen Reactions    Ciprofloxacin Itching    Contrast media      Kidney injury    Iodine      Kidney injury     Current Facility-Administered Medications   Medication Frequency    acetaminophen tablet 1,000 mg Q8H PRN    allopurinol split tablet 50 mg Every other day    atorvastatin tablet 80 mg Daily    calcitRIOL capsule 0.5 mcg Daily    calcium acetate(phosphat bind) capsule 1,334 mg TID WM    carvediloL tablet 6.25 mg BID    dextrose 10% bolus 125 mL 125 mL PRN    dextrose 10% bolus 250 mL 250 mL PRN    dextrose 40 % gel 15,000 mg PRN    dextrose 40 % gel 30,000 mg PRN    famotidine tablet 20 mg Daily    glucagon (human recombinant) injection 1 mg PRN    heparin (porcine) injection 1,000 Units PRN    hydrALAZINE tablet 75 mg TID    HYDROmorphone tablet 2 mg BID PRN    insulin aspart U-100 pen 0-5 Units QID (AC + HS) PRN    insulin detemir U-100 (Levemir) pen 4 Units QHS    isosorbide dinitrate tablet 40 mg TID    melatonin tablet 6 mg Nightly PRN    naloxone 0.4 mg/mL injection 0.02 mg PRN    ondansetron disintegrating tablet 8 mg Q8H PRN    sertraline tablet 50 mg Daily    sevelamer carbonate tablet 800 mg TID WM    sodium chloride 0.9% bolus 250 mL 250 mL PRN    sodium chloride 0.9% flush 10 mL PRN    torsemide tablet 20 mg Daily       Objective:     Vital Signs (Most Recent):  Temp: 98.1 °F (36.7 °C) (05/25/23 0810)  Pulse: 80 (05/25/23 0810)  Resp: 14 (05/25/23 0810)  BP: 122/65 (05/25/23 0810)  SpO2: 96 % (05/25/23 0810) Vital Signs (24h Range):  Temp:  [97.8 °F (36.6 °C)-98.4 °F (36.9 °C)] 98.1 °F (36.7 °C)  Pulse:  [76-87] 80  Resp:  [14-22] 14  SpO2:  [90 %-96 %] 96 %  BP: (115-129)/(58-65) 122/65     Weight: 99.2 kg (218 lb 11.1 oz) (05/25/23 0400)  Body mass index is 36.39 kg/m².  Body surface  area is 2.13 meters squared.    I/O last 3 completed shifts:  In: 600 [P.O.:600]  Out: -      Physical Exam  Constitutional:       General: She is not in acute distress.  HENT:      Right Ear: External ear normal.      Left Ear: External ear normal.      Nose: No congestion or rhinorrhea.      Mouth/Throat:      Mouth: Mucous membranes are moist.   Cardiovascular:      Rate and Rhythm: Normal rate and regular rhythm.      Pulses: Normal pulses.      Heart sounds: Normal heart sounds.   Pulmonary:      Effort: No respiratory distress.      Breath sounds: Normal breath sounds. No wheezing or rales.   Abdominal:      General: There is no distension.      Tenderness: There is no abdominal tenderness. There is no rebound.   Musculoskeletal:      Cervical back: No rigidity or tenderness.      Right lower leg: Edema present.      Left lower leg: Edema present.      Comments: Bilateral lower extremity amputation, pitting edema present   Neurological:      Mental Status: She is disoriented.      Comments: Asterixis present        Significant Labs:  All labs within the past 24 hours have been reviewed.     Significant Imaging:  Labs: Reviewed

## 2023-05-25 NOTE — PROGRESS NOTES
[facility] - Cardiology [facility] Medicine  Progress Note    Patient Name: Suyapa Connelly  MRN: [id number]  Patient Class: IP- Inpatient   Admission Date: 5/1/2023  Length of Stay: 22 days  Attending Physician: Ruma Schulte MD  Primary Care Provider: Magen Christensen MD        Subjective:     Principal Problem:Acute on chronic combined systolic and diastolic heart failure        HPI:  57 y.o. female with past medical history of CAD s/p CABG, CKD, DM, HTN, HLD, s/p bilateral AKA who presnets with chest pain, difficulty breathing, leg swelling.     Per ED She has been unable to access any of her medications for the last 3 months. Over the last few months she has noted worsening shortness of breath, particulatly orthopnea, and leg swelling. In the last two weeks she endorses associated abdominal pain and swelling, reduced appetite, and pleuritic chest pain with deep inspiration. She endorses occasional lighthededness with changes in position. Last week she had an episode where she was unable to breathe for about two minutes which was very upsetting for her and her , but she recovered and did not seek care at that time. Today, she complains of significant difficulty breathing, swelling in her legs and abdomen, and reduced appetite.     No headaches, visual changes, URI symptoms, palpitations, nausea, vomiting, diarrhea, blood in her stool, dysuria, hematuria, numbness or weakness in her extremities. She does endorse occasional paresthesias associated with her chronic phantom limb pain. She additionally has had increased difficulties with depression recently, denies suicidal or homicidal ideation, but has had considerable stress and low mood.      Of note, the patient saw a new primary care provider 4/21 for similar concerns who recommended she proceed to the ED at that time, but they were unable due to financial and geographic concerns. She lives two hours away from [facility] and her  cares for her  and elderly mother with dementia. She has had decreased access to medical care over the last year, and unable to access most of her prescriptions. She had a 90 day supply of four medications (lasix, statin, clopidogrel, sertraline) but these were accidentally lost and she has been unable to afford a replacement. They present today for evaluation and to reestablish care and access to her prescriptions.     Labs on admission significant for an elevated BNP, hypocalcemia which corrected is 8.3, significant elevation in Creatinine and a slight elevation of troponin    CXR  Cardiomegaly with pulmonary interstitial edema, suggestive of pulmonary edema secondary to CHF.      Overview/Hospital Course:  Diuresis initiated with Lasix IV.  1.2L UOP in the past 24 hours.  Vitals reassuring with adequate sats on ambient air.  Still very edematious; Cardiology recs lasix gtt.  GDMT initiated. Pt given 2mg morphine for moderate to severe pain in legs from swelling. Diuresis increased to 40mg/hr due to inadequate urine output. Cr continued to increase. S/p RHC that showed wedge of 26-32 with CVP of 15. Patient with no improvement in renal function, nephrology believes this is her new baseline. On diuresis but not making much urine. Plan for IR tunneled cath on 5/25 and will begin HD.       Interval History:  Reports the patient being altered at night, causing lost IV access.  Today patient persistently disorientation does not answer to questions retract to pain and grimaces.  We will discontinue amitriptyline as this could donya be contributing with her uremic encephalopathy.  Plan to place tunneled cath by IR followed by HD this afternoon. Stopping sevelamer per nephrology recs        Review of Systems  Objective:     Vital Signs (Most Recent):  Temp: 98.1 °F (36.7 °C) (05/25/23 0810)  Pulse: 80 (05/25/23 0810)  Resp: 14 (05/25/23 0810)  BP: 122/65 (05/25/23 0810)  SpO2: 96 % (05/25/23 0810) Vital Signs (24h Range):  Temp:  [97.7  °F (36.5 °C)-98.4 °F (36.9 °C)] 98.1 °F (36.7 °C)  Pulse:  [76-87] 80  Resp:  [14-22] 14  SpO2:  [90 %-97 %] 96 %  BP: (112-129)/(57-65) 122/65     Weight: 99.2 kg (218 lb 11.1 oz)  Body mass index is 36.39 kg/m².    Intake/Output Summary (Last 24 hours) at 5/25/2023 1105  Last data filed at 5/24/2023 1432  Gross per 24 hour   Intake 360 ml   Output --   Net 360 ml         Physical Exam  Constitutional:       General: She is not in acute distress.     Appearance: She is ill-appearing.      Comments: Somnolent   HENT:      Right Ear: Ear canal and external ear normal.      Left Ear: Ear canal and external ear normal.      Nose: Nose normal. No congestion or rhinorrhea.      Mouth/Throat:      Mouth: Mucous membranes are moist.   Cardiovascular:      Rate and Rhythm: Normal rate and regular rhythm.      Pulses: Normal pulses.      Heart sounds: Normal heart sounds.   Pulmonary:      Effort: No respiratory distress.      Breath sounds: Normal breath sounds. No wheezing or rales.   Abdominal:      General: There is no distension.      Tenderness: There is no abdominal tenderness. There is no rebound.   Musculoskeletal:      Cervical back: No rigidity or tenderness.      Comments: Bilateral lower extremity improved   Neurological:      Mental Status: She is disoriented.      Comments: Asterixis present           Significant Labs: All pertinent labs within the past 24 hours have been reviewed.  Recent Lab Results  (Last 5 results in the past 24 hours)        05/25/23  0807   05/25/23  0452   05/24/23  2156   05/24/23  1821   05/24/23  1623        Anion Gap   14       14       aPTT   34.1  Comment: Refer to local heparin nomogram for intensity/dose specific   therapeutic   range.               Baso #   0.06                0.06             Basophil %   0.9                0.9             BUN   100       98       Calcium   9.2       8.8       Chloride   94       94       CO2   26       27       Creatinine   7.8       7.7        Differential Method   Automated                Automated             eGFR   5.6       5.7       Eos #   0.3                0.3             Eosinophil %   4.0                4.0             Glucose   112       93       Gran # (ANC)   4.4                4.4             Gran %   67.8                67.8             Hematocrit   28.4                28.4             Hemoglobin   8.5                8.5             Immature Grans (Abs)   0.02  Comment: Mild elevation in immature granulocytes is non specific and   can be seen in a variety of conditions including stress response,   acute inflammation, trauma and pregnancy. Correlation with other   laboratory and clinical findings is essential.                  0.02  Comment: Mild elevation in immature granulocytes is non specific and   can be seen in a variety of conditions including stress response,   acute inflammation, trauma and pregnancy. Correlation with other   laboratory and clinical findings is essential.               Immature Granulocytes   0.3                0.3             Lymph #   0.9                0.9             Lymph %   14.0                14.0             Magnesium   2.2       2.1       MCH   30.0                30.0             MCHC   29.9                29.9             MCV   100                100             Mono #   0.8                0.8             Mono %   13.0                13.0             MPV   11.2                11.2             nRBC   0                0             Phosphorus   6.1       6.1       Platelets   295                295             POCT Glucose 120     109   105         Potassium   4.1       4.1       RBC   2.83                2.83             RDW   13.6                13.6             Sodium   134       135       WBC   6.48                6.48                                    Significant Imaging: I have reviewed all pertinent imaging results/findings within the past 24 hours.      Assessment/Plan:      * Acute on chronic  combined systolic and diastolic heart failure  Last Echo 6/21 showing     The estimated ejection fraction is 55%.   The left ventricle is normal in size with normal systolic function.   Normal left ventricular diastolic function.   Normal right ventricular size with normal right ventricular systolic function.   Mild tricuspid regurgitation.   The estimated PA systolic pressure is 31 mmHg.   Normal central venous pressure (3 mmHg).    New Echo   The left ventricle is mildly enlarged with severely decreased systolic function. The estimated ejection fraction is 15%.   There is severe left ventricular global hypokinesis.   Normal right ventricular size with low normal right ventricular systolic function.   Grade I left ventricular diastolic dysfunction.   Biatrial enlargement.   Mild-to-moderate mitral regurgitation.   Severe tricuspid regurgitation.   The estimated PA systolic pressure is 43 mmHg.   Elevated central venous pressure (15 mmHg).           RHC  CVP 15  Wedge 25-32    Patient did not tolerate dobutamine, had chest pain. Dobutamine dc'd  Per cardiology the patients significant peripheral vascular disease prohibits advanced options    - Torsemide daily   - Plan for HD on 5/25  - Isosorbide dinitrate   - Coreg 6.25mg BID             Chronic kidney disease-mineral and bone disorder  Appreciate nephrology recs;   - Cont calcitriol 0.5mg  - Cont calcium acetate 1334mg  - Stop sevelamer 800mg TID.   - Renal diet with protein intake goal 1.5 g/kg/d with 1 L fluid restriction   - Novasource with meals  - Daily renal panel so that phos and albumin is monitored daily.     Dependent on hemodialysis  +      Congestive heart failure  See primary problem    Echo    Interpretation Summary  · The left ventricle is mildly enlarged with severely decreased systolic function. The estimated ejection fraction is 15%.  · There is severe left ventricular global hypokinesis.  · Normal right ventricular size with low  normal right ventricular systolic function.  · Grade I left ventricular diastolic dysfunction.  · Biatrial enlargement.  · Mild-to-moderate mitral regurgitation.  · Severe tricuspid regurgitation.  · The estimated PA systolic pressure is 43 mmHg.  · Elevated central venous pressure (15 mmHg).    On Torsemide   Plan for HD on 5/25    Myalgia  Will continue to treat pain as needed      ROSLYN (acute kidney injury)  ROSLYN on CKD, likely ischemic, CRS and diuresis  Baseline serum creatinine in the 2s, up to 3.9 on admission; new baseline in the 7's   Retroperitoneal US with no hydronephrosis    ECHO with severe systolic dysfunction, EF of 15%    RHC with PCWP of 30   Plan for tunneled line and HD on 5/25    CKD (chronic kidney disease), stage IV  History of CKD. See ROSLYN.      Above-knee amputation of right lower extremity  S/P amputation of both lower limbs     Uses self-applied continuous glucose monitoring device  POCT glucose and daily CMPs    -Restart insulin for hyperglycemia      Dermatitis associated with moisture  Large body habitus with dermatitis to folds. Will monitor for infection.    -Zinc based powder  -Topical abx if indicated      Status post above knee amputation, left        Hypoalbuminemia  Pt with poor PO intake reported. Pt eats one meal a day and has associated nausea and vomiting. Will try antiemetics before feeds to improve intake. Will monitor daily intake and calorie replacement.     -Calorie count  -Diabetic/Renal diet  -Antiemetics before meals; Zofran 4 IV PRN  -Monitor Qtc for prolongation with antiemetics      Uremia  Elevating BUN. Worsening insomnia.   Will discuss goals and plan with nephrology.   5/22 encephalopathic  Plan for IR tunneled line on 5/25 and HD after    Impaired functional mobility and endurance  PT/OT for bedbound patient      Paroxysmal atrial fibrillation  Continue home rate control  NSR on EKG  On heparin drip for upcoming procedure           Anemia  Asymptomatic. Higher  than 8 months ago. No signs of acute bleed at this time. Haptoglobin elevated.    -CBC daily  -Transfuse if <7        S/P CABG x 1  History of. Sternal scar well healed.       CAD (coronary artery disease)  History of CAD with retrosternal chest pain. EKG negative to ST changes. Nonspecific T wave inversions.     -Cardiac tele  -EKG prn  -Bidil per Cards recs  -Hold plavix for line placement; continue heparin    CAROLIN (generalized anxiety disorder)  Continue home anxiety medications      Essential hypertension  Continue home antihypertensives      Hypocalcemia  Ionized calcium at 0.76. Ca correcting at 8.4.     -Calcium acetate for calcium repletion and for phos binding.         VTE Risk Mitigation (From admission, onward)         Ordered     heparin (porcine) injection 1,000 Units  As needed (PRN)         05/23/23 1436     IP VTE HIGH RISK PATIENT  Once         05/07/23 1547                Discharge Planning   DEVAN: 5/30/2023     Code Status: Full Code   Is the patient medically ready for discharge?: No    Reason for patient still in hospital (select all that apply): Patient trending condition  Discharge Plan A: Home Health, Home with family   Discharge Delays: None known at this time              Norman Mackey MD  Department of Hospital Medicine   [facility] - [facility] Step[facility]

## 2023-05-25 NOTE — PLAN OF CARE
Problem: Adult Inpatient Plan of Care  Goal: Plan of Care Review  Outcome: Ongoing, Progressing  Goal: Patient-Specific Goal (Individualized)  Outcome: Ongoing, Progressing  Goal: Absence of Hospital-Acquired Illness or Injury  Outcome: Ongoing, Progressing  Goal: Optimal Comfort and Wellbeing  Outcome: Ongoing, Progressing  Goal: Readiness for Transition of Care  Outcome: Ongoing, Progressing     Problem: Fluid and Electrolyte Imbalance (Acute Kidney Injury/Impairment)  Goal: Fluid and Electrolyte Balance  Outcome: Ongoing, Progressing     Problem: Oral Intake Inadequate (Acute Kidney Injury/Impairment)  Goal: Optimal Nutrition Intake  Outcome: Ongoing, Progressing     Problem: Renal Function Impairment (Acute Kidney Injury/Impairment)  Goal: Effective Renal Function  Outcome: Ongoing, Progressing     Problem: Skin Injury Risk Increased  Goal: Skin Health and Integrity  Outcome: Ongoing, Progressing     Problem: Impaired Wound Healing  Goal: Optimal Wound Healing  Outcome: Ongoing, Progressing     Problem: Fall Injury Risk  Goal: Absence of Fall and Fall-Related Injury  Outcome: Ongoing, Progressing     Problem: Infection  Goal: Absence of Infection Signs and Symptoms  Outcome: Ongoing, Progressing     Problem: Device-Related Complication Risk (Hemodialysis)  Goal: Safe, Effective Therapy Delivery  Outcome: Ongoing, Progressing     Problem: Hemodynamic Instability (Hemodialysis)  Goal: Effective Tissue Perfusion  Outcome: Ongoing, Progressing     Problem: Infection (Hemodialysis)  Goal: Absence of Infection Signs and Symptoms  Outcome: Ongoing, Progressing   Pt is disoriented x4. Perm Cath placed. Dialysis done today. 500 cc removed.

## 2023-05-25 NOTE — SUBJECTIVE & OBJECTIVE
Interval History:  Reports the patient being altered at night, causing lost IV access.  Today patient persistently disorientation does not answer to questions retract to pain and grimaces.  We will discontinue amitriptyline as this could donya be contributing with her uremic encephalopathy.  Plan to place tunneled cath by IR followed by HD this afternoon. Stopping sevelamer per nephrology recs        Review of Systems  Objective:     Vital Signs (Most Recent):  Temp: 98.1 °F (36.7 °C) (05/25/23 0810)  Pulse: 80 (05/25/23 0810)  Resp: 14 (05/25/23 0810)  BP: 122/65 (05/25/23 0810)  SpO2: 96 % (05/25/23 0810) Vital Signs (24h Range):  Temp:  [97.7 °F (36.5 °C)-98.4 °F (36.9 °C)] 98.1 °F (36.7 °C)  Pulse:  [76-87] 80  Resp:  [14-22] 14  SpO2:  [90 %-97 %] 96 %  BP: (112-129)/(57-65) 122/65     Weight: 99.2 kg (218 lb 11.1 oz)  Body mass index is 36.39 kg/m².    Intake/Output Summary (Last 24 hours) at 5/25/2023 1105  Last data filed at 5/24/2023 1432  Gross per 24 hour   Intake 360 ml   Output --   Net 360 ml         Physical Exam  Constitutional:       General: She is not in acute distress.     Appearance: She is ill-appearing.      Comments: Somnolent   HENT:      Right Ear: Ear canal and external ear normal.      Left Ear: Ear canal and external ear normal.      Nose: Nose normal. No congestion or rhinorrhea.      Mouth/Throat:      Mouth: Mucous membranes are moist.   Cardiovascular:      Rate and Rhythm: Normal rate and regular rhythm.      Pulses: Normal pulses.      Heart sounds: Normal heart sounds.   Pulmonary:      Effort: No respiratory distress.      Breath sounds: Normal breath sounds. No wheezing or rales.   Abdominal:      General: There is no distension.      Tenderness: There is no abdominal tenderness. There is no rebound.   Musculoskeletal:      Cervical back: No rigidity or tenderness.      Comments: Bilateral lower extremity improved   Neurological:      Mental Status: She is disoriented.       Comments: Asterixis present           Significant Labs: All pertinent labs within the past 24 hours have been reviewed.  Recent Lab Results  (Last 5 results in the past 24 hours)        05/25/23  0807   05/25/23  0452   05/24/23  2156   05/24/23  1821   05/24/23  1623        Anion Gap   14       14       aPTT   34.1  Comment: Refer to local heparin nomogram for intensity/dose specific   therapeutic   range.               Baso #   0.06                0.06             Basophil %   0.9                0.9             BUN   100       98       Calcium   9.2       8.8       Chloride   94       94       CO2   26       27       Creatinine   7.8       7.7       Differential Method   Automated                Automated             eGFR   5.6       5.7       Eos #   0.3                0.3             Eosinophil %   4.0                4.0             Glucose   112       93       Gran # (ANC)   4.4                4.4             Gran %   67.8                67.8             Hematocrit   28.4                28.4             Hemoglobin   8.5                8.5             Immature Grans (Abs)   0.02  Comment: Mild elevation in immature granulocytes is non specific and   can be seen in a variety of conditions including stress response,   acute inflammation, trauma and pregnancy. Correlation with other   laboratory and clinical findings is essential.                  0.02  Comment: Mild elevation in immature granulocytes is non specific and   can be seen in a variety of conditions including stress response,   acute inflammation, trauma and pregnancy. Correlation with other   laboratory and clinical findings is essential.               Immature Granulocytes   0.3                0.3             Lymph #   0.9                0.9             Lymph %   14.0                14.0             Magnesium   2.2       2.1       MCH   30.0                30.0             MCHC   29.9                29.9             MCV   100                100              Mono #   0.8                0.8             Mono %   13.0                13.0             MPV   11.2                11.2             nRBC   0                0             Phosphorus   6.1       6.1       Platelets   295                295             POCT Glucose 120     109   105         Potassium   4.1       4.1       RBC   2.83                2.83             RDW   13.6                13.6             Sodium   134       135       WBC   6.48                6.48                                    Significant Imaging: I have reviewed all pertinent imaging results/findings within the past 24 hours.

## 2023-05-25 NOTE — ANESTHESIA POSTPROCEDURE EVALUATION
Anesthesia Post Evaluation    Patient: Suyapa Connelly    Procedure(s) Performed: * No procedures listed *    Final Anesthesia Type: MAC      Patient location during evaluation: PACU  Patient participation: Yes- Able to Participate  Level of consciousness: awake and alert and oriented  Post-procedure vital signs: reviewed and stable  Pain management: adequate  Airway patency: patent    PONV status at discharge: No PONV  Anesthetic complications: no      Cardiovascular status: blood pressure returned to baseline  Respiratory status: unassisted, room air and spontaneous ventilation  Hydration status: euvolemic  Follow-up not needed.          Vitals Value Taken Time   /61 05/25/23 1615   Temp 36.4 °C (97.6 °F) 05/25/23 1511   Pulse 78 05/25/23 1615   Resp 16 05/25/23 1545   SpO2 96 % 05/25/23 1545         Event Time   Out of Recovery 05/25/2023 14:15:00         Pain/Shannan Score: Shannan Score: 9 (5/25/2023  2:15 PM)

## 2023-05-25 NOTE — PROGRESS NOTES
05/25/23 1756   Post-Hemodialysis Assessment   Rinseback Volume (mL) 250 mL   Blood Volume Processed (Liters) 28.8 L   Dialyzer Clearance Lightly streaked   Duration of Treatment 120 minutes   Additional Fluid Intake (mL) 0 mL   Total UF (mL) 750 mL   Net Fluid Removal 500   Patient Response to Treatment tolerated   Post-Treatment Weight   (bedside HD TX)   Post-Hemodialysis Comments see notes     HD TX complete. Pt tolerated. Pt arouses to voice, VSS, NAD. Net  ml. Report given to primary nurse.

## 2023-05-25 NOTE — PT/OT/SLP PROGRESS
Occupational Therapy      Patient Name:  Suyapa Connelly   MRN:  [id number]    Patient not seen today secondary to patient with altered mental status with reduced ability to participate in therapeutic activities, potentially due to need for HD. Per RN, patient will have HD today. Will follow-up as appropriate.    5/25/2023

## 2023-05-25 NOTE — ASSESSMENT & PLAN NOTE
Recent Labs   Lab 05/23/23  1710 05/24/23  0906 05/25/23  0452   WBC 6.09 7.07  7.07 6.48  6.48   HGB 8.5* 8.6*  8.6* 8.5*  8.5*   HCT 28.2* 30.9*  30.9* 28.4*  28.4*    242  242 295  295     Lab Results   Component Value Date    FESATURATED 24 02/04/2022    FERRITIN 1,103 (H) 02/04/2022     - Goal is Hgb of 10-11.   - Obtain iron studies in AM   - plan for EPO with dialysis.

## 2023-05-25 NOTE — PT/OT/SLP PROGRESS
Physical Therapy      Patient Name:  Suyapa Connelly   MRN:  [id number]    Patient not seen today secondary to pt with AMS and awaiting HD per RN. Anticipating potential improvement in mental status after dialysis. Will hold therapy session due to pt with decreased ability to actively participate. Will follow-up 5/26/23.     5/25/2023

## 2023-05-25 NOTE — PROCEDURES
[facility] - Cardiology Stepdown  Interventional Radiology  High Risk Procedure - Inpatient      Date: 05/25/2023 Time: 1:56 PM    Pre-Op Diagnosis: CKD 4, CHF, CAD    Post-Op Diagnosis: same    Procedure Performed by: Celeste    Assistant: N/A    Procedure: THDC placement    Specimen/Tissue Removed: N/A    Estimated Blood Loss: Less than 50 mL    Procedure Note/Findings: 14.5F 23 cm long Glidepath THDC placed via left IJV approach.  Catheter tip in SVC.  Both ports aspirate and flush easily.  Ready to use.            Please refer to dictated report for additional details.

## 2023-05-25 NOTE — TRANSFER OF CARE
"Anesthesia Transfer of Care Note    Patient: Suyapa Connelly    Procedure(s) Performed: * No procedures listed *    Patient location: PACU    Anesthesia Type: MAC    Transport from OR: Transported from OR on room air with adequate spontaneous ventilation    Post pain: adequate analgesia    Post assessment: no apparent anesthetic complications and tolerated procedure well    Post vital signs: stable    Level of consciousness: awake    Nausea/Vomiting: no nausea/vomiting    Complications: none    Transfer of care protocol was followed      Last vitals:   Visit Vitals  BP (!) 135/58   Pulse 80   Temp 36.7 °C (98.1 °F) (Tympanic)   Resp 14   Ht 5' 5" (1.651 m)   Wt 99.2 kg (218 lb 11.1 oz)   LMP 09/30/2015 (Exact Date)   SpO2 96%   Breastfeeding No   BMI 36.39 kg/m²     "

## 2023-05-25 NOTE — ASSESSMENT & PLAN NOTE
Appreciate nephrology recs;   - Cont calcitriol 0.5mg  - Cont calcium acetate 1334mg  - Stop sevelamer 800mg TID.   - Renal diet with protein intake goal 1.5 g/kg/d with 1 L fluid restriction   - Novasource with meals  - Daily renal panel so that phos and albumin is monitored daily.

## 2023-05-25 NOTE — PLAN OF CARE
Recommendations     1. When diet is advanced, consider liberalizing to regular diet (d/t decreased intake)     2. Continue Optisource for optimization of protein and calorie intake      3. RD following     Goals: Meet % of EEN/EPN by RD f/u date  Nutrition Goal Status: goal not met  Communication of RD Recs: other (comment)

## 2023-05-25 NOTE — PLAN OF CARE
05/25/23 0926   Post-Acute Status   Post-Acute Authorization Dialysis   Diaylsis Status Referrals Sent     Per treatment team pt is getting tunneled cath today and will need outpatient HD chair.  Nephrology social worker notified.  Will continue to follow.      Jenna Bonilla LMSW  [facility]  [phone]

## 2023-05-25 NOTE — PLAN OF CARE
Problem: Adult Inpatient Plan of Care  Goal: Plan of Care Review  Outcome: Ongoing, Progressing  Goal: Patient-Specific Goal (Individualized)  Outcome: Ongoing, Progressing  Goal: Absence of Hospital-Acquired Illness or Injury  Outcome: Ongoing, Progressing  Goal: Optimal Comfort and Wellbeing  Outcome: Ongoing, Progressing  Goal: Readiness for Transition of Care  Outcome: Ongoing, Progressing     Problem: Fluid and Electrolyte Imbalance (Acute Kidney Injury/Impairment)  Goal: Fluid and Electrolyte Balance  Outcome: Ongoing, Progressing     Problem: Oral Intake Inadequate (Acute Kidney Injury/Impairment)  Goal: Optimal Nutrition Intake  Outcome: Ongoing, Progressing     Problem: Renal Function Impairment (Acute Kidney Injury/Impairment)  Goal: Effective Renal Function  Outcome: Ongoing, Progressing     Problem: Skin Injury Risk Increased  Goal: Skin Health and Integrity  Outcome: Ongoing, Progressing     Problem: Impaired Wound Healing  Goal: Optimal Wound Healing  Outcome: Ongoing, Progressing     Problem: Fall Injury Risk  Goal: Absence of Fall and Fall-Related Injury  Outcome: Ongoing, Progressing     Problem: Infection  Goal: Absence of Infection Signs and Symptoms  Outcome: Ongoing, Progressing     Problem: Device-Related Complication Risk (Hemodialysis)  Goal: Safe, Effective Therapy Delivery  Outcome: Ongoing, Progressing     Problem: Hemodynamic Instability (Hemodialysis)  Goal: Effective Tissue Perfusion  Outcome: Ongoing, Progressing     Problem: Infection (Hemodialysis)  Goal: Absence of Infection Signs and Symptoms  Outcome: Ongoing, Progressing   Pt is A+Ox4. With periods of confusion.

## 2023-05-25 NOTE — PROGRESS NOTES
[facility] - Cardiology Stepdown  Nephrology  Progress Note    Patient Name: Suyapa Connelly  MRN: [id number]  Admission Date: 5/1/2023  Hospital Length of Stay: 21 days  Attending Provider: Batsheva Torres DO   Primary Care Physician: Magen Christensen MD  Principal Problem:Acute on chronic combined systolic and diastolic heart failure    Subjective:     HPI: 57 y.o. female with past medical history of CAD s/p CABG, CKD, DM, HTN, HLD, s/p bilateral AKA who presnets with chest pain, difficulty breathing, leg swelling. Her most recent ECHO revealed severe systolic dysfunction with EF of 15%. She has been started on diuretics with IV lasix and PO metolazone with adequate urinary response however her renal function has been deteriorating throughout the admission. She reports requiring dialysis at one time before, around the time she underwent her AKAs. Her baseline serum creatinine is in the 2s and it has trended up to 5 during this admission. Nephrology consulted for further evaluation of ROSLYN on CKD.       Interval History:  Seen and evaluated by bedside. More drowsy this morning with serum BUN/Cr trending up concerning for encephalopathy. Discussed with the pts  the need to initiate dialysis who agreed.     Physical Exam  Vitals and nursing note reviewed.   Constitutional:       General: She is not in acute distress.     Appearance: Normal appearance. She is obese. She is not ill-appearing or diaphoretic.   HENT:      Head: Normocephalic and atraumatic.      Right Ear: External ear normal.      Left Ear: External ear normal.      Nose: Nose normal.      Mouth/Throat:      Mouth: Mucous membranes are moist.      Pharynx: Oropharynx is clear.   Eyes:      General: No scleral icterus.     Extraocular Movements: Extraocular movements intact.      Conjunctiva/sclera: Conjunctivae normal.      Pupils: Pupils are equal, round, and reactive to light.   Cardiovascular:      Rate and Rhythm: Normal rate and regular rhythm.       Pulses: Normal pulses.      Heart sounds: Normal heart sounds. No murmur heard.  Pulmonary:      Effort: Pulmonary effort is normal. No respiratory distress.      Breath sounds: Normal breath sounds. No stridor. No wheezing or rhonchi.   Abdominal:      General: Abdomen is flat. There is no distension.      Palpations: Abdomen is soft.      Tenderness: There is no abdominal tenderness. There is no guarding.   Musculoskeletal:         General: Swelling present. No tenderness. Normal range of motion.      Cervical back: Normal range of motion and neck supple. No rigidity or tenderness.      Right lower leg: Edema present.      Left lower leg: Edema present.      Comments: Trigger finger R middle finger  BL AKA stump edema +2  L thigh tenderness with palpation    Skin:     General: Skin is warm and dry.      Capillary Refill: Capillary refill takes less than 2 seconds.      Coloration: Skin is not jaundiced.      Findings: No erythema.   Neurological:      General: No focal deficit present.      Mental Status: She is alert and oriented to person, place, and time.   Psychiatric:         Mood and Affect: Mood normal.         Behavior: Behavior normal.     Assessment/Plan:     Cardiac/Vascular  * Acute on chronic combined systolic and diastolic heart failure  Management per primary         S/P CABG x 1  Management per primary         Renal/  Chronic kidney disease-mineral and bone disorder  Mineral Bone Disease  - Calcitriol 0.5mg  - Cont calcium acetate 1334mg  - Stop sevelamer 800mg TID.   - Renal diet with protein intake goal 1.5 g/kg/d with 1 L fluid restriction   - Novasource with meals  - Daily renal panel so that phos and albumin is monitored daily.         ROSLYN (acute kidney injury)  - ROSLYN on CKD, likely ischemic, CRS and diuresis  - Baseline serum creatinine in the 2s, up to 3.9 on admission; up to 7.9 on her most recent labs  - Retroperitoneal US with no hydronephrosis    - ECHO with severe systolic  dysfunction, EF of 15%    - RHC with PCWP of 30   - patient with increase confusion, showing some early signs of uremia.   - discussion held with family who are in agreement with starting patient on dialysis.     Plan:  - obtain dialysis catheter placement  - plan to initiate dialysis inpatient; consent obtained from  and placed in the chart   -  consult for HD chair placement.   - renally dose all meds  - Avoid nephrotoxic agents as much as possible       Uremia  See ROSLYN      Case discussed with attending. Attestation to follow.       Jeremy Juan MD  Nephrology  [facility]    ATTENDING PHYSICIAN ATTESTATION  I have personally verified the history and examined the patient. I thoroughly reviewed the demographic, clinical, laboratorial and imaging information available in medical records. I agree with the assessment and recommendations provided by the subspecialty resident who was under my supervision.

## 2023-05-25 NOTE — PROGRESS NOTES
Pt eyes closed, arouses to touch. Pt VSS, NAD. HD started to Left IJ. Pt  at bedside, plan of care reviewed and dialysis procedure explained, questions answered.

## 2023-05-25 NOTE — MEDICAL/APP STUDENT
[facility] Medicine Student   Progress Note  [facility] MED 2    Admit Date: 5/1/2023  Hospital Day: 22 05/25/2023  11:26 AM    SUBJECTIVE:   Ms. Suyapa Connelly is a 57 y.o. female with a relevant medical history of Chronic heart failure with Ejection Fraction of 15%,  Type 2 diabetes, Hypertension and above knee amputation due to peripheral vascular disease who is being followed up for Acute on chronic combined systolic and diastolic heart failure and chronic Kidney disease stage 5 a3 (KDIGO SCORE)    Interval History  Chief Complaint:  Chronic Kidney disease stage 5 a3 (KDIGO SCORE) and Heart failure with reduced ejection fraction     History of Present Illness:  Ms.Trisha KEYSHA Connelly has an altered state of consciousness. At the moment in the company of her , who refers that the patient has become more confused in the last few days. The patient's  denies changes in patient urine or stool, seizures, or abnormal movements. No new events overnight    ROS answered by the  due patient condition  Review of Systems   Constitutional:  Negative for chills and fever.   HENT:  Negative for congestion and sore throat.    Eyes:  Negative for redness.   Respiratory:  Negative for cough and shortness of breath.    Cardiovascular:  Negative for leg swelling.   Gastrointestinal:  Negative for constipation and vomiting.   Genitourinary:  Negative for frequency and hematuria.   Neurological:  Negative for sensory change, speech change and seizures.       OBJECTIVE:     Vital Signs Recent:  Temp: 98.1 °F (36.7 °C) (05/25/23 0810)  Pulse: 80 (05/25/23 0810)  Resp: 14 (05/25/23 0810)  BP: 122/65 (05/25/23 0810)  SpO2: 96 % (05/25/23 0810)  Oxygen Documentation:    Flow (L/min): 1           Device (Oxygen Therapy): room air         Vital Signs Range (Last 24H):  Temp:  [97.8 °F (36.6 °C)-98.4 °F (36.9 °C)]   Pulse:  [76-87]   Resp:  [14-22]   BP: (115-129)/(58-65)   SpO2:  [90 %-96 %]        I & O (Last  24H):  Intake/Output Summary (Last 24 hours) at 5/25/2023 1126  Last data filed at 5/24/2023 1432  Gross per 24 hour   Intake 360 ml   Output --   Net 360 ml        Physical Exam:  Physical Exam  Constitutional:       Appearance: She is obese. She is ill-appearing.      Comments: Sleepy but voice alertable   HENT:      Head: Normocephalic.      Comments: Pupils normorreactive to light, pink scleras, wet tongue  Cardiovascular:      Pulses: Normal pulses.      Heart sounds: No murmur heard.    No friction rub. No gallop.   Pulmonary:      Effort: No respiratory distress.      Breath sounds: Rhonchi present. No wheezing.   Abdominal:      General: There is no distension.      Palpations: There is no mass.      Tenderness: There is generalized abdominal tenderness.      Comments: Normal bowel sounds, no signs of peritonitis   Musculoskeletal:         General: No swelling.      Right lower leg: No edema.      Left lower leg: No edema.   Skin:     General: Skin is warm.      Capillary Refill: Capillary refill takes less than 2 seconds.      Coloration: Skin is pale. Skin is not jaundiced.      Findings: No lesion or rash.   Neurological:      Mental Status: She is easily aroused. She is lethargic, disoriented and confused.      GCS: GCS eye subscore is 4. GCS verbal subscore is 2. GCS motor subscore is 3.      Comments: Patient unable to follow instructions   Psychiatric:         Attention and Perception: She is inattentive.       Labs:   Recent Labs   Lab 05/24/23  0906 05/24/23  1623 05/25/23  0452   * 135* 134*   K 4.4 4.1 4.1   CL 94* 94* 94*   CO2 21* 27 26   BUN 94* 98* 100*   CREATININE 7.9* 7.7* 7.8*   GLU 71 93 112*   CALCIUM 8.8 8.8 9.2   MG 2.0 2.1 2.2   PHOS 5.7* 6.1* 6.1*     Recent Labs   Lab 05/21/23  0747 05/23/23  1710   INR 1.2 1.1     Recent Labs   Lab 05/23/23  1710 05/24/23  0906 05/25/23  0452   WBC 6.09 7.07  7.07 6.48  6.48   HGB 8.5* 8.6*  8.6* 8.5*  8.5*   HCT 28.2* 30.9*  30.9* 28.4*   28.4*    242  242 295  295       Diagnostic Results:  Blood chart with mild anemia, low hematocrit, no thrombocitopenia, no changes in the INR.    Scheduled Meds:   allopurinoL  50 mg Oral Every other day    atorvastatin  80 mg Oral Daily    calcitRIOL  0.5 mcg Oral Daily    calcium acetate(phosphat bind)  1,334 mg Oral TID WM    carvediloL  6.25 mg Oral BID    famotidine  20 mg Oral Daily    hydrALAZINE  75 mg Oral TID    insulin detemir U-100  4 Units Subcutaneous QHS    isosorbide dinitrate  40 mg Oral TID    mupirocin   Nasal BID    sertraline  50 mg Oral Daily    sevelamer carbonate  800 mg Oral TID WM    torsemide  20 mg Oral Daily     Continuous Infusions:  PRN Meds:acetaminophen, dextrose 10%, dextrose 10%, dextrose, dextrose, glucagon (human recombinant), heparin (porcine), HYDROmorphone, insulin aspart U-100, melatonin, naloxone, ondansetron, sodium chloride 0.9%, sodium chloride 0.9%    ASSESSMENT/PLAN:   Ms. Suyapa Connelly is a 57 y.o. female with a relevant medical history of Chronic heart failure with Ejection Fraction of 15%,  Type 2 diabetes, Hypertension and above knee amputation due to peripheral vascular disease who is being followed up for Acute on chronic combined systolic and diastolic heart failure.    Active Hospital Problems    Diagnosis  POA    *Acute on chronic combined systolic and diastolic heart failure [I50.43]  Yes    Dependent on hemodialysis [Z99.2]  Not Applicable    Chronic kidney disease-mineral and bone disorder [N18.9, E83.9, M89.9]  Unknown    Congestive heart failure [I50.9]  Yes    Myalgia [M79.10]  No    ROSLYN (acute kidney injury) [N17.9]  Yes    CKD (chronic kidney disease), stage IV [N18.4]  Yes     Chronic    Uses self-applied continuous glucose monitoring device [Z97.8]  Yes    Dermatitis associated with moisture [L30.8]  Yes    Above-knee amputation of right lower extremity [S78.111A]  Yes    Status post above knee amputation, left [Z89.612]  Not  Applicable    Hypoalbuminemia [E88.09]  Yes    Uremia [N19]  Yes    Impaired functional mobility and endurance [Z74.09]  Yes    Paroxysmal atrial fibrillation [I48.0]  Yes    S/P CABG x 1 [Z95.1]  Not Applicable    Anemia [D64.9]  Yes    CAD (coronary artery disease) [I25.10]  Yes     Formatting of this note might be different from the original.  Last Assessment & Plan:   Formatting of this note might be different from the original.  -- Optimize glucose control.        CAROLIN (generalized anxiety disorder) [F41.1]  Yes    Essential hypertension [I10]  Yes    Hypocalcemia [E83.51]  Yes    PAD (peripheral artery disease) [I73.9]  Yes      Resolved Hospital Problems    Diagnosis Date Resolved POA    Acute hypoxemic respiratory failure [J96.01] 05/23/2023 Yes       Hypoactive Delirium  ASSESSMENT: Patient with altered state of consciousness secondary to kidney disease. Today with worsening state of consciousness. Patient with cardiovascular disease, last electrocardiogram with prolonged Qtc (496 msc) and decreased output, for which reason we decid to adjust treatment for delirium.   PLAN:  - Stop Amitriptyline.  - Olanzapine. IV. 10 mg daily at 3 pm  - Education in non pharmacological measured for delirium    2. Chronic Kidney disease stage 5 a3 (KDIGO SCORE)  Assesment: Patient with advanced kidney disease, at the time with altered state of consciousness due to uremia, without deterioration in the last few days. In follow-up by nephrology team, patient has criteria for dialysis.    PLAN  - Tunneled Catheter today    3. Chronic Heart Failure Disease with reduced ejection fraction  ASSESSMENT: Patient with heart failure with reduced ejection fraction (15%), no congestive signs. Due to renal failure, it is not possible to start empagliflozin and sacubitril valsartan.  PLAN:  - Lisinopril IV 5 mg 8 am  - Metoprolol IV 50 mg at 8 am and 8 pm  - No requirements for diuretic.    4. Anemia  Patient with anemia secondary to Kidney  and heart disease . In this moment without requirements of transfusion.     5. Press ulcer Risk  Patient with altered metal status, we recommend switch position every to hour for preventing press ulcers. Skin cares.

## 2023-05-25 NOTE — NURSING TRANSFER
Nursing Transfer Note      5/25/2023     Reason patient is being transferred: post procedure     Transfer To: 349    Transfer via bed    Transfer with cardiac monitoring    Transported by transport     Medicines sent: none     Any special needs or follow-up needed: routine     Chart send with patient: Yes    Notified: spouse    Patient reassessed at: 1400 on 5/25

## 2023-05-26 LAB
ALBUMIN SERPL BCP-MCNC: 2.3 G/DL (ref 3.5–5.2)
ANION GAP SERPL CALC-SCNC: 11 MMOL/L (ref 8–16)
ANION GAP SERPL CALC-SCNC: 11 MMOL/L (ref 8–16)
BASOPHILS # BLD AUTO: 0.04 K/UL (ref 0–0.2)
BASOPHILS NFR BLD: 0.6 % (ref 0–1.9)
BUN SERPL-MCNC: 33 MG/DL (ref 6–20)
BUN SERPL-MCNC: 62 MG/DL (ref 6–20)
CALCIUM SERPL-MCNC: 8.7 MG/DL (ref 8.7–10.5)
CALCIUM SERPL-MCNC: 9 MG/DL (ref 8.7–10.5)
CHLORIDE SERPL-SCNC: 104 MMOL/L (ref 95–110)
CHLORIDE SERPL-SCNC: 99 MMOL/L (ref 95–110)
CO2 SERPL-SCNC: 21 MMOL/L (ref 23–29)
CO2 SERPL-SCNC: 26 MMOL/L (ref 23–29)
CREAT SERPL-MCNC: 3.5 MG/DL (ref 0.5–1.4)
CREAT SERPL-MCNC: 5.6 MG/DL (ref 0.5–1.4)
DIFFERENTIAL METHOD: ABNORMAL
EOSINOPHIL # BLD AUTO: 0.2 K/UL (ref 0–0.5)
EOSINOPHIL NFR BLD: 3.6 % (ref 0–8)
ERYTHROCYTE [DISTWIDTH] IN BLOOD BY AUTOMATED COUNT: 14 % (ref 11.5–14.5)
EST. GFR  (NO RACE VARIABLE): 14.7 ML/MIN/1.73 M^2
EST. GFR  (NO RACE VARIABLE): 8.4 ML/MIN/1.73 M^2
GLUCOSE SERPL-MCNC: 115 MG/DL (ref 70–110)
GLUCOSE SERPL-MCNC: 81 MG/DL (ref 70–110)
HBV CORE AB SERPL QL IA: NORMAL
HBV SURFACE AB SER-ACNC: <3 MIU/ML
HBV SURFACE AB SER-ACNC: NORMAL M[IU]/ML
HBV SURFACE AG SERPL QL IA: NORMAL
HCT VFR BLD AUTO: 27.7 % (ref 37–48.5)
HGB BLD-MCNC: 8.3 G/DL (ref 12–16)
IMM GRANULOCYTES # BLD AUTO: 0.01 K/UL (ref 0–0.04)
IMM GRANULOCYTES NFR BLD AUTO: 0.2 % (ref 0–0.5)
LYMPHOCYTES # BLD AUTO: 0.9 K/UL (ref 1–4.8)
LYMPHOCYTES NFR BLD: 14.1 % (ref 18–48)
MAGNESIUM SERPL-MCNC: 1.9 MG/DL (ref 1.6–2.6)
MAGNESIUM SERPL-MCNC: 2.1 MG/DL (ref 1.6–2.6)
MCH RBC QN AUTO: 30.5 PG (ref 27–31)
MCHC RBC AUTO-ENTMCNC: 30 G/DL (ref 32–36)
MCV RBC AUTO: 102 FL (ref 82–98)
MONOCYTES # BLD AUTO: 0.8 K/UL (ref 0.3–1)
MONOCYTES NFR BLD: 12.3 % (ref 4–15)
NEUTROPHILS # BLD AUTO: 4.4 K/UL (ref 1.8–7.7)
NEUTROPHILS NFR BLD: 69.2 % (ref 38–73)
NRBC BLD-RTO: 0 /100 WBC
PHOSPHATE SERPL-MCNC: 2.9 MG/DL (ref 2.7–4.5)
PHOSPHATE SERPL-MCNC: 4.3 MG/DL (ref 2.7–4.5)
PLATELET # BLD AUTO: 286 K/UL (ref 150–450)
PMV BLD AUTO: 11.5 FL (ref 9.2–12.9)
POCT GLUCOSE: 103 MG/DL (ref 70–110)
POCT GLUCOSE: 106 MG/DL (ref 70–110)
POCT GLUCOSE: 97 MG/DL (ref 70–110)
POTASSIUM SERPL-SCNC: 4 MMOL/L (ref 3.5–5.1)
POTASSIUM SERPL-SCNC: 4.4 MMOL/L (ref 3.5–5.1)
RBC # BLD AUTO: 2.72 M/UL (ref 4–5.4)
SODIUM SERPL-SCNC: 136 MMOL/L (ref 136–145)
SODIUM SERPL-SCNC: 136 MMOL/L (ref 136–145)
WBC # BLD AUTO: 6.32 K/UL (ref 3.9–12.7)

## 2023-05-26 PROCEDURE — 85025 COMPLETE CBC W/AUTO DIFF WBC: CPT

## 2023-05-26 PROCEDURE — 25000003 PHARM REV CODE 250

## 2023-05-26 PROCEDURE — 25000003 PHARM REV CODE 250: Performed by: INTERNAL MEDICINE

## 2023-05-26 PROCEDURE — 90935 HEMODIALYSIS ONE EVALUATION: CPT

## 2023-05-26 PROCEDURE — 83735 ASSAY OF MAGNESIUM: CPT | Mod: 91 | Performed by: STUDENT IN AN ORGANIZED HEALTH CARE EDUCATION/TRAINING PROGRAM

## 2023-05-26 PROCEDURE — 82040 ASSAY OF SERUM ALBUMIN: CPT | Performed by: STUDENT IN AN ORGANIZED HEALTH CARE EDUCATION/TRAINING PROGRAM

## 2023-05-26 PROCEDURE — 99232 SBSQ HOSP IP/OBS MODERATE 35: CPT | Mod: GC,,, | Performed by: INTERNAL MEDICINE

## 2023-05-26 PROCEDURE — 99232 PR SUBSEQUENT HOSPITAL CARE,LEVL II: ICD-10-PCS | Mod: GC,,, | Performed by: INTERNAL MEDICINE

## 2023-05-26 PROCEDURE — 97530 THERAPEUTIC ACTIVITIES: CPT

## 2023-05-26 PROCEDURE — 25000003 PHARM REV CODE 250: Performed by: STUDENT IN AN ORGANIZED HEALTH CARE EDUCATION/TRAINING PROGRAM

## 2023-05-26 PROCEDURE — 80048 BASIC METABOLIC PNL TOTAL CA: CPT | Mod: 91 | Performed by: STUDENT IN AN ORGANIZED HEALTH CARE EDUCATION/TRAINING PROGRAM

## 2023-05-26 PROCEDURE — 86704 HEP B CORE ANTIBODY TOTAL: CPT

## 2023-05-26 PROCEDURE — 25000003 PHARM REV CODE 250: Performed by: HOSPITALIST

## 2023-05-26 PROCEDURE — 63600175 PHARM REV CODE 636 W HCPCS: Performed by: INTERNAL MEDICINE

## 2023-05-26 PROCEDURE — 36415 COLL VENOUS BLD VENIPUNCTURE: CPT | Performed by: STUDENT IN AN ORGANIZED HEALTH CARE EDUCATION/TRAINING PROGRAM

## 2023-05-26 PROCEDURE — 20600001 HC STEP DOWN PRIVATE ROOM

## 2023-05-26 PROCEDURE — 97110 THERAPEUTIC EXERCISES: CPT

## 2023-05-26 PROCEDURE — 86706 HEP B SURFACE ANTIBODY: CPT | Mod: 91

## 2023-05-26 PROCEDURE — 87340 HEPATITIS B SURFACE AG IA: CPT

## 2023-05-26 PROCEDURE — 99233 SBSQ HOSP IP/OBS HIGH 50: CPT | Mod: ,,, | Performed by: INTERNAL MEDICINE

## 2023-05-26 PROCEDURE — 99233 PR SUBSEQUENT HOSPITAL CARE,LEVL III: ICD-10-PCS | Mod: ,,, | Performed by: INTERNAL MEDICINE

## 2023-05-26 PROCEDURE — 84100 ASSAY OF PHOSPHORUS: CPT | Mod: 91 | Performed by: STUDENT IN AN ORGANIZED HEALTH CARE EDUCATION/TRAINING PROGRAM

## 2023-05-26 RX ORDER — SODIUM CHLORIDE 9 MG/ML
INJECTION, SOLUTION INTRAVENOUS ONCE
Status: CANCELLED | OUTPATIENT
Start: 2023-05-26 | End: 2023-05-26

## 2023-05-26 RX ORDER — CLOPIDOGREL BISULFATE 75 MG/1
75 TABLET ORAL DAILY
Status: DISCONTINUED | OUTPATIENT
Start: 2023-05-26 | End: 2023-05-29 | Stop reason: HOSPADM

## 2023-05-26 RX ORDER — HEPARIN SODIUM 1000 [USP'U]/ML
1000 INJECTION, SOLUTION INTRAVENOUS; SUBCUTANEOUS
Status: CANCELLED | OUTPATIENT
Start: 2023-05-26 | End: 2023-05-27

## 2023-05-26 RX ORDER — SODIUM CHLORIDE 9 MG/ML
INJECTION, SOLUTION INTRAVENOUS
Status: CANCELLED | OUTPATIENT
Start: 2023-05-26

## 2023-05-26 RX ORDER — HYDRALAZINE HYDROCHLORIDE 50 MG/1
50 TABLET, FILM COATED ORAL 3 TIMES DAILY
Status: DISCONTINUED | OUTPATIENT
Start: 2023-05-26 | End: 2023-05-29 | Stop reason: HOSPADM

## 2023-05-26 RX ADMIN — CLOPIDOGREL BISULFATE 75 MG: 75 TABLET ORAL at 08:05

## 2023-05-26 RX ADMIN — CALCIUM ACETATE 1334 MG: 667 CAPSULE ORAL at 01:05

## 2023-05-26 RX ADMIN — HYDRALAZINE HYDROCHLORIDE 50 MG: 50 TABLET ORAL at 03:05

## 2023-05-26 RX ADMIN — APIXABAN 5 MG: 5 TABLET, FILM COATED ORAL at 08:05

## 2023-05-26 RX ADMIN — ISOSORBIDE DINITRATE 40 MG: 20 TABLET ORAL at 08:05

## 2023-05-26 RX ADMIN — CARVEDILOL 6.25 MG: 6.25 TABLET, FILM COATED ORAL at 09:05

## 2023-05-26 RX ADMIN — ATORVASTATIN CALCIUM 80 MG: 40 TABLET, FILM COATED ORAL at 08:05

## 2023-05-26 RX ADMIN — HYDRALAZINE HYDROCHLORIDE 50 MG: 50 TABLET ORAL at 08:05

## 2023-05-26 RX ADMIN — ISOSORBIDE DINITRATE 40 MG: 20 TABLET ORAL at 09:05

## 2023-05-26 RX ADMIN — SERTRALINE HYDROCHLORIDE 50 MG: 50 TABLET ORAL at 08:05

## 2023-05-26 RX ADMIN — HEPARIN SODIUM 1000 UNITS: 1000 INJECTION, SOLUTION INTRAVENOUS; SUBCUTANEOUS at 12:05

## 2023-05-26 RX ADMIN — MUPIROCIN: 20 OINTMENT TOPICAL at 08:05

## 2023-05-26 RX ADMIN — CALCIUM ACETATE 1334 MG: 667 CAPSULE ORAL at 05:05

## 2023-05-26 RX ADMIN — CALCIUM ACETATE 1334 MG: 667 CAPSULE ORAL at 08:05

## 2023-05-26 RX ADMIN — FAMOTIDINE 20 MG: 20 TABLET ORAL at 08:05

## 2023-05-26 RX ADMIN — ISOSORBIDE DINITRATE 40 MG: 20 TABLET ORAL at 03:05

## 2023-05-26 RX ADMIN — CALCITRIOL CAPSULES 0.25 MCG 0.5 MCG: 0.25 CAPSULE ORAL at 08:05

## 2023-05-26 RX ADMIN — CARVEDILOL 6.25 MG: 6.25 TABLET, FILM COATED ORAL at 08:05

## 2023-05-26 RX ADMIN — MUPIROCIN: 20 OINTMENT TOPICAL at 09:05

## 2023-05-26 RX ADMIN — Medication 6 MG: at 08:05

## 2023-05-26 NOTE — PT/OT/SLP PROGRESS
Occupational Therapy      Patient Name:  Suyapa Connelly   MRN:  [id number]    Patient not seen today secondary to patient off the floor for dialysis at first attempt. Second attempt not made on this date. Will follow-up as appropriate.    5/26/2023

## 2023-05-26 NOTE — PLAN OF CARE
Plan of Care:  Goals updated. Continue current POC, progressing as tolerated.       2023    Physical Therapy Treatment Goals:  Multidisciplinary Problems       Physical Therapy Goals          Problem: Physical Therapy    Goal Priority Disciplines Outcome Goal Variances Interventions   Physical Therapy Goal     PT, PT/OT Ongoing, Progressing     Description: Goals to be met by: extended to 23    Patient will increase functional independence with mobility by performin. Supine to sit with Minimal Assistance.  2. Sit to supine with Minimal Assistance.  3. Bed to chair transfer with Minimal Assistance using LRAD.  4. Wheelchair propulsion x50 feet with Minimal Assistance using bilateral upper extremities.  5. Spouse verbalizes and demonstrates understanding on family training with focus on transfers.

## 2023-05-26 NOTE — NURSING
Dialysis tx started to the left chest perm cath per orders placed by KATHLEEN Qureshi:    Order Questions    Question Answer   Antibiotics on HD? No   Duration of Treatment 2 hours   Dialyzer Revaclear 300   Dialysate Temperature (C) 37    mL/min    mL/min   K+ Potassium per Protocol   Ca++ Calcium per Protocol   Na+ 140 MEQ/L   Bicarb 30 meq   Access CVC   Location Subclavian   Laterality Right   Fluid Removal (L) 0.5L   Fluid Removal Instructions maintain SBP > 90 mmHG   Dialysate Bath Solution Protocol (DO NOT MODIFY ANSWER) [email or web address] Dialysate Bath Solution Algorithm (formatted with date).pdf

## 2023-05-26 NOTE — ASSESSMENT & PLAN NOTE
ROSLYN on CKD, likely ischemic, CRS and diuresis  Baseline serum creatinine in the 2s, up to 3.9 on admission; new baseline in the 7's   Retroperitoneal US with no hydronephrosis    ECHO with severe systolic dysfunction, EF of 15%    RHC with PCWP of 30   Plan for tunneled line and HD on 5/25  HD session #2 5/26

## 2023-05-26 NOTE — SUBJECTIVE & OBJECTIVE
Interval History: Patient underwent perm cath placement yesterday with IR. Tolerated procedure well. Underwent first session of dialysis without any issue. Patient remains confused but more alert than yesterday. Scheduled for second session of iHD today.     Review of patient's allergies indicates:   Allergen Reactions    Ciprofloxacin Itching    Contrast media      Kidney injury    Iodine      Kidney injury     Current Facility-Administered Medications   Medication Frequency    acetaminophen tablet 1,000 mg Q8H PRN    allopurinol split tablet 50 mg Every other day    apixaban tablet 5 mg BID    atorvastatin tablet 80 mg Daily    calcitRIOL capsule 0.5 mcg Daily    calcium acetate(phosphat bind) capsule 1,334 mg TID WM    carvediloL tablet 6.25 mg BID    clopidogreL tablet 75 mg Daily    dextrose 10% bolus 125 mL 125 mL PRN    dextrose 10% bolus 250 mL 250 mL PRN    dextrose 40 % gel 15,000 mg PRN    dextrose 40 % gel 30,000 mg PRN    famotidine tablet 20 mg Daily    glucagon (human recombinant) injection 1 mg PRN    heparin (porcine) injection 1,000 Units PRN    hydrALAZINE tablet 50 mg TID    HYDROmorphone tablet 2 mg BID PRN    insulin aspart U-100 pen 0-5 Units QID (AC + HS) PRN    isosorbide dinitrate tablet 40 mg TID    melatonin tablet 6 mg Nightly PRN    mupirocin 2 % ointment BID    naloxone 0.4 mg/mL injection 0.02 mg PRN    ondansetron disintegrating tablet 8 mg Q8H PRN    sertraline tablet 50 mg Daily    sodium chloride 0.9% bolus 250 mL 250 mL PRN    sodium chloride 0.9% flush 10 mL PRN       Objective:     Vital Signs (Most Recent):  Temp: 98.6 °F (37 °C) (05/26/23 1019)  Pulse: 81 (05/26/23 1100)  Resp: 18 (05/26/23 0856)  BP: 122/61 (05/26/23 1100)  SpO2: 97 % (05/26/23 0856) Vital Signs (24h Range):  Temp:  [97.5 °F (36.4 °C)-98.7 °F (37.1 °C)] 98.6 °F (37 °C)  Pulse:  [75-86] 81  Resp:  [11-18] 18  SpO2:  [92 %-99 %] 97 %  BP: (101-137)/(52-66) 122/61     Weight:  (Bed scale was not zeroed out,  weight not accurate.) (05/26/23 9278)  Body mass index is 36.39 kg/m².  Body surface area is 2.13 meters squared.    I/O last 3 completed shifts:  In: 340 [P.O.:240; IV Piggyback:100]  Out: 1475 [Urine:725; Other:750]     Physical Exam  Constitutional:       General: She is not in acute distress.  HENT:      Right Ear: External ear normal.      Left Ear: External ear normal.      Nose: No congestion or rhinorrhea.      Mouth/Throat:      Mouth: Mucous membranes are moist.   Cardiovascular:      Rate and Rhythm: Normal rate and regular rhythm.      Pulses: Normal pulses.      Heart sounds: Normal heart sounds.   Pulmonary:      Effort: No respiratory distress.      Breath sounds: Normal breath sounds. No wheezing or rales.   Abdominal:      General: There is no distension.      Tenderness: There is no abdominal tenderness. There is no rebound.   Musculoskeletal:      Cervical back: No rigidity or tenderness.      Right lower leg: Edema present.      Left lower leg: Edema present.      Comments: Bilateral lower extremity amputation, pitting edema present   Neurological:      Mental Status: She is disoriented.      Comments: Asterixis present        Significant Labs:  All labs within the past 24 hours have been reviewed.     Significant Imaging:  Labs: Reviewed

## 2023-05-26 NOTE — ASSESSMENT & PLAN NOTE
Mineral Bone Disease  Lab Results   Component Value Date    .5 (H) 05/16/2023    CALCIUM 8.7 05/26/2023    CAION 0.92 (L) 05/17/2023    PHOS 4.3 05/26/2023       - Cont calcitriol 0.5mg  - Cont calcium acetate 1334mg  - Stop sevelamer 800mg TID.   - Renal diet with protein intake goal 1.5 g/kg/d with 1 L fluid restriction   - Novasource with meals  - Daily renal panel so that phos and albumin is monitored daily.
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
## 2023-05-26 NOTE — PLAN OF CARE
Problem: Hemodynamic Instability (Hemodialysis)  Goal: Effective Tissue Perfusion  Outcome: Ongoing, Progressing     Dialysis tx ended to the left chest perm cath, heparin locked and capped.    Net fluid removed: 500 ml    Report given to floor nurse, pt transported by bed from [facility] to room 349

## 2023-05-26 NOTE — ASSESSMENT & PLAN NOTE
History of CAD with retrosternal chest pain. EKG negative to ST changes. Nonspecific T wave inversions.     -Cardiac tele  -EKG prn  -Bidil per Cards recs  -Restarting plavix/apixaban after line placement

## 2023-05-26 NOTE — PT/OT/SLP PROGRESS
Physical Therapy Treatment    Patient Name:  Suyapa Connelly   MRN:  [id number]    Recommendations:     Discharge Recommendations: other (see comments)  Discharge Equipment Recommendations: drop arm commode, hospital bed, lift device  Barriers to discharge:  increased level of caregiver support required     Assessment:     Suyapa Connelly is a 57 y.o. female admitted with a medical diagnosis of Acute on chronic combined systolic and diastolic heart failure.  She presents with the following impairments/functional limitations: weakness, impaired endurance, impaired functional mobility, impaired balance, decreased lower extremity function. Pt remains motivated to participate in therapy session and demonstrated improvement in activity tolerance this visit. Pt alert and able to follow single step commands. Spouse present at bedside and encouraging pt during session. Pt will continue to require caregiver assistance upon discharge for transfers to wheelchair in addition to ADLs, and spouse reported feeling comfortable with level of support required.  Rehab Prognosis: Good; patient would benefit from acute skilled PT services to address these deficits and reach maximum level of function.      Recent Surgery: Procedure(s) (LRB):  Insertion, Catheter, Central Venous, Hemodialysis (Left) 3 Days Post-Op    Plan:     During this hospitalization, patient to be seen 3 x/week to address the identified rehab impairments via therapeutic activities, therapeutic exercises, neuromuscular re-education and progress toward the following goals:    Plan of Care Expires:  06/18/23    Subjective     Chief Complaint: pain at end of session after rolling L and R; RN notified   Patient/Family Comments/goals: to return home with spouse per pt report   Pain/Comfort:  Pain Rating 1: 0/10  Location - Side 1: Bilateral  Location 1: thigh  Pain Addressed 1: Reposition, Distraction, Nurse notified  Pain Rating Post-Intervention 1:  (did not  rate)      Objective:     Communicated with RN prior to session.  Patient found HOB elevated with peripheral IV, snow catheter, telemetry upon PT entry to room.     General Precautions: Standard, fall  Orthopedic Precautions: N/A  Braces: N/A  Respiratory Status: Room air    Functional Mobility:    Bed Mobility:   Rolling: to right with moderate assistance and left with moderate assistance using handrails   Supine > Sit: minimal assistance  Sit > Supine: moderate assistance  Scooting:   Pt performed multiple trials of forward scooting towards EOB with minimal assistance, using BUEs. Required increased time to allow for therapist cueing to improve technique, weight shifting and hand placement in addition to rest breaks due to fatigue.     Balance:   Sitting balance: GOOD-: Incosistently Maintains balance through MODERATE excursions of active trunk movement,                   AM-PAC 6 CLICK MOBILITY  Turning over in bed (including adjusting bedclothes, sheets and blankets)?: 3  Sitting down on and standing up from a chair with arms (e.g., wheelchair, bedside commode, etc.): 1  Moving from lying on back to sitting on the side of the bed?: 2  Moving to and from a bed to a chair (including a wheelchair)?: 2  Need to walk in hospital room?: 1  Climbing 3-5 steps with a railing?: 1  Basic Mobility Total Score: 10       Treatment & Education:  Therapeutic activities aimed to increase pt's independence, safety, and efficiency with bed mobility and scooting along EOB to prepare for transfers to wheelchair. See above for assistance levels. Pt tolerated sitting EOB x 25 minutes with assistance SBA and progression to supervision. Pt performed BUE therex to improve functional strength and endurance including shoulder flexion, alternating forward punches and bicep curls. Assisted pt with change of gown and repositioning for comfort and pressure relief once returned to supine with HOB elevated.     Patient left HOB elevated in  left sidelying for pressure relief with all lines intact, call button in reach, and RN notified.    GOALS:   Multidisciplinary Problems       Physical Therapy Goals          Problem: Physical Therapy    Goal Priority Disciplines Outcome Goal Variances Interventions   Physical Therapy Goal     PT, PT/OT Ongoing, Progressing     Description: Goals to be met by: extended to 23    Patient will increase functional independence with mobility by performin. Supine to sit with Minimal Assistance.  2. Sit to supine with Minimal Assistance.  3. Bed to chair transfer with Minimal Assistance using LRAD.  4. Wheelchair propulsion x50 feet with Minimal Assistance using bilateral upper extremities.  5. Spouse verbalizes and demonstrates understanding on family training with focus on transfers.                          Time Tracking:     PT Received On: 23  PT Start Time: 820     PT Stop Time: 858  PT Total Time (min): 38 min     Billable Minutes: Therapeutic Activity 28 min and Therapeutic Exercise 10 min    Treatment Type: Treatment  PT/PTA: PT     Number of PTA visits since last PT visit: 0     2023

## 2023-05-26 NOTE — ASSESSMENT & PLAN NOTE
Elevating BUN. Worsening insomnia.   Will discuss goals and plan with nephrology.   5/22 encephalopathic  Plan for IR tunneled line on 5/25 and HD after  5/26; Mentation improving after 1st HD session 500cc removed

## 2023-05-26 NOTE — PLAN OF CARE
EDUARD spoke with Buzz from [facility] pt admissions ([phone]). Pt was accepted at [facility] and can start outpt dialysis Wednesday 05/31/2023 at 1:30pm. Pt will follow on MWF schedule.    EDUARD to inform inpt treatment team.    Renuka Calloway LMSW  [facility]  [phone]

## 2023-05-26 NOTE — ASSESSMENT & PLAN NOTE
Now likely with ESRD  - ROSLYN on CKD, likely ischemic, CRS and diuresis  - Baseline serum creatinine in the 2s, up to 3.9 on admission; up to 7.9 on her most recent labs  - Retroperitoneal US with no hydronephrosis    - ECHO with severe systolic dysfunction, EF of 15%    - RHC with PCWP of 30   - patient with increase confusion, showing some mild signs of uremia.   - discussion held with family who are in agreement with starting patient on dialysis  - S/p iHD on 05/25/23.   Plan:  - iHD again for metabolic clearance and volume removal ( 300/600; 2 hrs; r300; 500cc UF).   -  consult for HD chair placement.   - renally dose all meds  - Avoid nephrotoxic agents as much as possible    - cont torsemide 20 mg QD

## 2023-05-26 NOTE — PROGRESS NOTES
[facility] - Cardiology [facility] Medicine  Progress Note    Patient Name: Suyapa Connelly  MRN: [id number]  Patient Class: IP- Inpatient   Admission Date: 5/1/2023  Length of Stay: 23 days  Attending Physician: Ruma Schulte MD  Primary Care Provider: Magen Christensen MD        Subjective:     Principal Problem:Acute on chronic combined systolic and diastolic heart failure        HPI:  57 y.o. female with past medical history of CAD s/p CABG, CKD, DM, HTN, HLD, s/p bilateral AKA who presnets with chest pain, difficulty breathing, leg swelling.     Per ED She has been unable to access any of her medications for the last 3 months. Over the last few months she has noted worsening shortness of breath, particulatly orthopnea, and leg swelling. In the last two weeks she endorses associated abdominal pain and swelling, reduced appetite, and pleuritic chest pain with deep inspiration. She endorses occasional lighthededness with changes in position. Last week she had an episode where she was unable to breathe for about two minutes which was very upsetting for her and her , but she recovered and did not seek care at that time. Today, she complains of significant difficulty breathing, swelling in her legs and abdomen, and reduced appetite.     No headaches, visual changes, URI symptoms, palpitations, nausea, vomiting, diarrhea, blood in her stool, dysuria, hematuria, numbness or weakness in her extremities. She does endorse occasional paresthesias associated with her chronic phantom limb pain. She additionally has had increased difficulties with depression recently, denies suicidal or homicidal ideation, but has had considerable stress and low mood.      Of note, the patient saw a new primary care provider 4/21 for similar concerns who recommended she proceed to the ED at that time, but they were unable due to financial and geographic concerns. She lives two hours away from [facility] and her  cares for her  and elderly mother with dementia. She has had decreased access to medical care over the last year, and unable to access most of her prescriptions. She had a 90 day supply of four medications (lasix, statin, clopidogrel, sertraline) but these were accidentally lost and she has been unable to afford a replacement. They present today for evaluation and to reestablish care and access to her prescriptions.     Labs on admission significant for an elevated BNP, hypocalcemia which corrected is 8.3, significant elevation in Creatinine and a slight elevation of troponin    CXR  Cardiomegaly with pulmonary interstitial edema, suggestive of pulmonary edema secondary to CHF.      Overview/Hospital Course:  Diuresis initiated with Lasix IV.  1.2L UOP in the past 24 hours.  Vitals reassuring with adequate sats on ambient air.  Still very edematious; Cardiology recs lasix gtt.  GDMT initiated. Pt given 2mg morphine for moderate to severe pain in legs from swelling. Diuresis increased to 40mg/hr due to inadequate urine output. Cr continued to increase. S/p RHC that showed wedge of 26-32 with CVP of 15. Patient with no improvement in renal function, nephrology believes this is her new baseline. On diuresis but not making much urine. Plan for IR placed tunneled cath on 5/25 with significant improvement in mentation      Interval History:  Patient got tunnel cath placed overnight and started hemodialysis, tolerated 1st session well, 500 cc removed.  Significant improvement in mentation, although altered this morning due to family dynamics issues.  We will continue with hemodialysis while inpatient, 2nd session today, we will follow up with Nephrology for discharge planning.    Review of Systems   Constitutional:  Positive for activity change (mentation with significant improvement). Negative for chills and fever.   HENT:  Negative for rhinorrhea and sore throat.    Respiratory:  Negative for cough and shortness of breath.     Cardiovascular:  Negative for leg swelling.   Gastrointestinal:  Negative for abdominal distention and abdominal pain.   Musculoskeletal:  Negative for back pain and myalgias.   Skin:  Negative for pallor and rash.   Neurological:  Positive for headaches. Negative for tremors and speech difficulty.        Asterixis is barely noticeable today.   Psychiatric/Behavioral:  Positive for agitation, confusion and dysphoric mood.         I believe pertinent positives are related to ongoing family dynamic issues.   Objective:     Vital Signs (Most Recent):  Temp: 98.4 °F (36.9 °C) (05/26/23 1235)  Pulse: 81 (05/26/23 1235)  Resp: 17 (05/26/23 1235)  BP: (!) 114/56 (05/26/23 1235)  SpO2: 97 % (05/26/23 0856) Vital Signs (24h Range):  Temp:  [97.5 °F (36.4 °C)-98.7 °F (37.1 °C)] 98.4 °F (36.9 °C)  Pulse:  [75-86] 81  Resp:  [11-18] 17  SpO2:  [92 %-99 %] 97 %  BP: (101-137)/(51-66) 114/56     Weight:  (Bed scale was not zeroed out, weight not accurate.)  Body mass index is 36.39 kg/m².    Intake/Output Summary (Last 24 hours) at 5/26/2023 1309  Last data filed at 5/26/2023 1225  Gross per 24 hour   Intake 1190 ml   Output 3051 ml   Net -1861 ml         Physical Exam  Constitutional:       General: She is in acute distress (2/2 to family dynamics).      Comments: Significant improvement in mentation, patient is oriented to person place and time although she acknowledges she still having trouble retrieving words.   HENT:      Right Ear: Ear canal and external ear normal.      Left Ear: Ear canal and external ear normal.      Nose: Nose normal. No congestion or rhinorrhea.      Mouth/Throat:      Mouth: Mucous membranes are moist.   Cardiovascular:      Rate and Rhythm: Normal rate and regular rhythm.      Pulses: Normal pulses.      Heart sounds: Normal heart sounds.   Pulmonary:      Effort: No respiratory distress.      Breath sounds: Normal breath sounds. No wheezing or rales.   Abdominal:      General: There is no  distension.      Tenderness: There is no abdominal tenderness. There is no rebound.   Musculoskeletal:      Cervical back: No rigidity or tenderness.      Comments: Bilateral lower extremity improved   Neurological:      Mental Status: She is disoriented.      Comments: Asterixis barely perceptible           Significant Labs: All pertinent labs within the past 24 hours have been reviewed.  Recent Lab Results  (Last 5 results in the past 24 hours)        05/26/23  1223   05/26/23  0915   05/26/23  0523   05/25/23  2116   05/25/23 2010        Albumin     2.3           Anion Gap     11     13       Baso #     0.04           Basophil %     0.6           BUN     62     59       Calcium     8.7     8.6       Chloride     99     99       CO2     26     27       Creatinine     5.6     5.1       Differential Method     Automated           eGFR     8.4     9.4       Eos #     0.2           Eosinophil %     3.6           Glucose     81     82       Gran # (ANC)     4.4           Gran %     69.2           Hematocrit     27.7           Hemoglobin     8.3           Hep B Core Total Ab     Non-reactive           Hep B S Ab     <3.00                Non-reactive  Comment: Individual is considered not immune to HBV infection.           Hepatitis B Surface Ag     Non-reactive           Immature Grans (Abs)     0.01  Comment: Mild elevation in immature granulocytes is non specific and   can be seen in a variety of conditions including stress response,   acute inflammation, trauma and pregnancy. Correlation with other   laboratory and clinical findings is essential.             Immature Granulocytes     0.2           Lymph #     0.9           Lymph %     14.1           Magnesium     2.1     2.0       MCH     30.5           MCHC     30.0           MCV     102           Mono #     0.8           Mono %     12.3           MPV     11.5           nRBC     0           Phosphorus     4.3     3.6       Platelets     286           POCT Glucose  103   97     84         Potassium     4.4     3.8       RBC     2.72           RDW     14.0           Sodium     136     139       WBC     6.32                                  Significant Imaging: I have reviewed all pertinent imaging results/findings within the past 24 hours.      Assessment/Plan:      * Acute on chronic combined systolic and diastolic heart failure  Last Echo 6/21 showing     The estimated ejection fraction is 55%.   The left ventricle is normal in size with normal systolic function.   Normal left ventricular diastolic function.   Normal right ventricular size with normal right ventricular systolic function.   Mild tricuspid regurgitation.   The estimated PA systolic pressure is 31 mmHg.   Normal central venous pressure (3 mmHg).    New Echo   The left ventricle is mildly enlarged with severely decreased systolic function. The estimated ejection fraction is 15%.   There is severe left ventricular global hypokinesis.   Normal right ventricular size with low normal right ventricular systolic function.   Grade I left ventricular diastolic dysfunction.   Biatrial enlargement.   Mild-to-moderate mitral regurgitation.   Severe tricuspid regurgitation.   The estimated PA systolic pressure is 43 mmHg.   Elevated central venous pressure (15 mmHg).           RHC  CVP 15  Wedge 25-32    Patient did not tolerate dobutamine, had chest pain. Dobutamine dc'd  Per cardiology the patients significant peripheral vascular disease prohibits advanced options    -d/c Torsemide    - Plan for HD session # 5/26  - Isosorbide dinitrate   - Coreg 6.25mg BID   -Reduced Hydralazine from 75 to 50 After HD will resume her regular dose as needed            Acute metabolic encephalopathy        Uremic encephalopathy        Chronic kidney disease-mineral and bone disorder  Appreciate nephrology recs;   - Cont calcitriol 0.5mg  - Cont calcium acetate 1334mg  - Stop sevelamer 800mg TID.   - Renal diet with protein  intake goal 1.5 g/kg/d with 1 L fluid restriction   - Novasource with meals  - Daily renal panel so that phos and albumin is monitored daily.     Dependent on hemodialysis  +      Congestive heart failure  See primary problem    Echo    Interpretation Summary  · The left ventricle is mildly enlarged with severely decreased systolic function. The estimated ejection fraction is 15%.  · There is severe left ventricular global hypokinesis.  · Normal right ventricular size with low normal right ventricular systolic function.  · Grade I left ventricular diastolic dysfunction.  · Biatrial enlargement.  · Mild-to-moderate mitral regurgitation.  · Severe tricuspid regurgitation.  · The estimated PA systolic pressure is 43 mmHg.  · Elevated central venous pressure (15 mmHg).    On Torsemide   Plan for HD on 5/25    Myalgia  Will continue to treat pain as needed      ROSLYN (acute kidney injury)  ROSLYN on CKD, likely ischemic, CRS and diuresis  Baseline serum creatinine in the 2s, up to 3.9 on admission; new baseline in the 7's   Retroperitoneal US with no hydronephrosis    ECHO with severe systolic dysfunction, EF of 15%    RHC with PCWP of 30   Plan for tunneled line and HD on 5/25  HD session #2 5/26    CKD (chronic kidney disease), stage IV  History of CKD. See ROSLYN.      Above-knee amputation of right lower extremity  S/P amputation of both lower limbs     Uses self-applied continuous glucose monitoring device  POCT glucose and daily CMPs    -Restart insulin for hyperglycemia      Dermatitis associated with moisture  Large body habitus with dermatitis to folds. Will monitor for infection.    -Zinc based powder  -Topical abx if indicated      Status post above knee amputation, left        Hypoalbuminemia  Pt with poor PO intake reported. Pt eats one meal a day and has associated nausea and vomiting. Will try antiemetics before feeds to improve intake. Will monitor daily intake and calorie replacement.     -Calorie  count  -Diabetic/Renal diet  -Antiemetics before meals; Zofran 4 IV PRN  -Monitor Qtc for prolongation with antiemetics      Uremia  Elevating BUN. Worsening insomnia.   Will discuss goals and plan with nephrology.   5/22 encephalopathic  Plan for IR tunneled line on 5/25 and HD after  5/26; Mentation improving after 1st HD session 500cc removed    Impaired functional mobility and endurance  PT/OT for bedbound patient      Paroxysmal atrial fibrillation  Continue home rate control  NSR on EKG              Anemia  Asymptomatic. Higher than 8 months ago. No signs of acute bleed at this time. Haptoglobin elevated.    -CBC daily  -Transfuse if <7        S/P CABG x 1  History of. Sternal scar well healed.       CAD (coronary artery disease)  History of CAD with retrosternal chest pain. EKG negative to ST changes. Nonspecific T wave inversions.     -Cardiac tele  -EKG prn  -Bidil per Cards recs  -Restarting plavix/apixaban after line placement    CAROLIN (generalized anxiety disorder)  Continue home setraline      Essential hypertension  Continue home antihypertensives      Hypocalcemia  Ionized calcium at 0.76. Ca correcting at 8.4.     -Calcium acetate for calcium repletion and for phos binding.         VTE Risk Mitigation (From admission, onward)         Ordered     apixaban tablet 5 mg  2 times daily         05/26/23 0728     heparin (porcine) injection 1,000 Units  As needed (PRN)         05/23/23 1436     IP VTE HIGH RISK PATIENT  Once         05/07/23 1547                Discharge Planning   DEAVN: 5/30/2023     Code Status: Full Code   Is the patient medically ready for discharge?: No    Reason for patient still in hospital (select all that apply): Patient trending condition  Discharge Plan A: Home Health, Home with family   Discharge Delays: None known at this time              Norman Mackey MD  Department of Hospital Medicine   [facility]

## 2023-05-26 NOTE — PROGRESS NOTES
[facility] - [facility]  Nephrology  Progress Note    Patient Name: Suyapa Connelly  MRN: [id number]  Admission Date: 5/1/2023  Hospital Length of Stay: 23 days  Attending Provider: Ruma Schulte MD   Primary Care Physician: Magen Christensen MD  Principal Problem:Acute on chronic combined systolic and diastolic heart failure    Subjective:     HPI: 57 y.o. female with past medical history of CAD s/p CABG, CKD, DM, HTN, HLD, s/p bilateral AKA who presnets with chest pain, difficulty breathing, leg swelling. Her most recent ECHO revealed severe systolic dysfunction with EF of 15%. She has been started on diuretics with IV lasix and PO metolazone with adequate urinary response however her renal function has been deteriorating throughout the admission. She reports requiring dialysis at one time before, around the time she underwent her AKAs. Her baseline serum creatinine is in the 2s and it has trended up to 5 during this admission. Nephrology consulted for further evaluation of ROSLYN on CKD.         Interval History: Patient underwent perm cath placement yesterday with IR. Tolerated procedure well. Underwent first session of dialysis without any issue. Patient remains confused but more alert than yesterday. Scheduled for second session of iHD today.     Review of patient's allergies indicates:   Allergen Reactions    Ciprofloxacin Itching    Contrast media      Kidney injury    Iodine      Kidney injury     Current Facility-Administered Medications   Medication Frequency    acetaminophen tablet 1,000 mg Q8H PRN    allopurinol split tablet 50 mg Every other day    apixaban tablet 5 mg BID    atorvastatin tablet 80 mg Daily    calcitRIOL capsule 0.5 mcg Daily    calcium acetate(phosphat bind) capsule 1,334 mg TID WM    carvediloL tablet 6.25 mg BID    clopidogreL tablet 75 mg Daily    dextrose 10% bolus 125 mL 125 mL PRN    dextrose 10% bolus 250 mL 250 mL PRN    dextrose 40 % gel 15,000 mg PRN     dextrose 40 % gel 30,000 mg PRN    famotidine tablet 20 mg Daily    glucagon (human recombinant) injection 1 mg PRN    heparin (porcine) injection 1,000 Units PRN    hydrALAZINE tablet 50 mg TID    HYDROmorphone tablet 2 mg BID PRN    insulin aspart U-100 pen 0-5 Units QID (AC + HS) PRN    isosorbide dinitrate tablet 40 mg TID    melatonin tablet 6 mg Nightly PRN    mupirocin 2 % ointment BID    naloxone 0.4 mg/mL injection 0.02 mg PRN    ondansetron disintegrating tablet 8 mg Q8H PRN    sertraline tablet 50 mg Daily    sodium chloride 0.9% bolus 250 mL 250 mL PRN    sodium chloride 0.9% flush 10 mL PRN       Objective:     Vital Signs (Most Recent):  Temp: 98.6 °F (37 °C) (05/26/23 1019)  Pulse: 81 (05/26/23 1100)  Resp: 18 (05/26/23 0856)  BP: 122/61 (05/26/23 1100)  SpO2: 97 % (05/26/23 0856) Vital Signs (24h Range):  Temp:  [97.5 °F (36.4 °C)-98.7 °F (37.1 °C)] 98.6 °F (37 °C)  Pulse:  [75-86] 81  Resp:  [11-18] 18  SpO2:  [92 %-99 %] 97 %  BP: (101-137)/(52-66) 122/61     Weight:  (Bed scale was not zeroed out, weight not accurate.) (05/26/23 0501)  Body mass index is 36.39 kg/m².  Body surface area is 2.13 meters squared.    I/O last 3 completed shifts:  In: 340 [P.O.:240; IV Piggyback:100]  Out: 1475 [Urine:725; Other:750]     Physical Exam  Constitutional:       General: She is not in acute distress.  HENT:      Right Ear: External ear normal.      Left Ear: External ear normal.      Nose: No congestion or rhinorrhea.      Mouth/Throat:      Mouth: Mucous membranes are moist.   Cardiovascular:      Rate and Rhythm: Normal rate and regular rhythm.      Pulses: Normal pulses.      Heart sounds: Normal heart sounds.   Pulmonary:      Effort: No respiratory distress.      Breath sounds: Normal breath sounds. No wheezing or rales.   Abdominal:      General: There is no distension.      Tenderness: There is no abdominal tenderness. There is no rebound.   Musculoskeletal:      Cervical back: No  rigidity or tenderness.      Right lower leg: Edema present.      Left lower leg: Edema present.      Comments: Bilateral lower extremity amputation, pitting edema present   Neurological:      Mental Status: She is disoriented.      Comments: Asterixis present        Significant Labs:  All labs within the past 24 hours have been reviewed.     Significant Imaging:  Labs: Reviewed    Assessment/Plan:     Cardiac/Vascular  * Acute on chronic combined systolic and diastolic heart failure  Management per primary       Renal/  Chronic kidney disease-mineral and bone disorder  Mineral Bone Disease  Lab Results   Component Value Date    .5 (H) 05/16/2023    CALCIUM 8.7 05/26/2023    CAION 0.92 (L) 05/17/2023    PHOS 4.3 05/26/2023       - Cont calcitriol 0.5mg  - Cont calcium acetate 1334mg  - Stop sevelamer 800mg TID.   - Renal diet with protein intake goal 1.5 g/kg/d with 1 L fluid restriction   - Novasource with meals  - Daily renal panel so that phos and albumin is monitored daily.       ROSLYN (acute kidney injury)  Now likely with ESRD  - ROSLYN on CKD, likely ischemic, CRS and diuresis  - Baseline serum creatinine in the 2s, up to 3.9 on admission; up to 7.9 on her most recent labs  - Retroperitoneal US with no hydronephrosis    - ECHO with severe systolic dysfunction, EF of 15%    - RHC with PCWP of 30   - patient with increase confusion, showing some mild signs of uremia.   - discussion held with family who are in agreement with starting patient on dialysis  - S/p iHD on 05/25/23.   Plan:  - iHD again for metabolic clearance and volume removal ( 300/600; 2 hrs; r300; 500cc UF).   -  consult for HD chair placement.   - renally dose all meds  - Avoid nephrotoxic agents as much as possible    - cont torsemide 20 mg QD    Uremia  See ROSLYN        Thank you for your consult. I will follow-up with patient. Please contact us if you have any additional questions.     Case discussed with attending. Attestation  to follow.       Jay Qureshi, DO  Nephrology  [facility]

## 2023-05-26 NOTE — SUBJECTIVE & OBJECTIVE
Interval History:  Patient got tunnel cath placed overnight and started hemodialysis, tolerated 1st session well, 500 cc removed.  Significant improvement in mentation, although altered this morning due to family dynamics issues.  We will continue with hemodialysis while inpatient, 2nd session today, we will follow up with Nephrology for discharge planning.    Review of Systems   Constitutional:  Positive for activity change (mentation with significant improvement). Negative for chills and fever.   HENT:  Negative for rhinorrhea and sore throat.    Respiratory:  Negative for cough and shortness of breath.    Cardiovascular:  Negative for leg swelling.   Gastrointestinal:  Negative for abdominal distention and abdominal pain.   Musculoskeletal:  Negative for back pain and myalgias.   Skin:  Negative for pallor and rash.   Neurological:  Positive for headaches. Negative for tremors and speech difficulty.        Asterixis is barely noticeable today.   Psychiatric/Behavioral:  Positive for agitation, confusion and dysphoric mood.         I believe pertinent positives are related to ongoing family dynamic issues.   Objective:     Vital Signs (Most Recent):  Temp: 98.4 °F (36.9 °C) (05/26/23 1235)  Pulse: 81 (05/26/23 1235)  Resp: 17 (05/26/23 1235)  BP: (!) 114/56 (05/26/23 1235)  SpO2: 97 % (05/26/23 0856) Vital Signs (24h Range):  Temp:  [97.5 °F (36.4 °C)-98.7 °F (37.1 °C)] 98.4 °F (36.9 °C)  Pulse:  [75-86] 81  Resp:  [11-18] 17  SpO2:  [92 %-99 %] 97 %  BP: (101-137)/(51-66) 114/56     Weight:  (Bed scale was not zeroed out, weight not accurate.)  Body mass index is 36.39 kg/m².    Intake/Output Summary (Last 24 hours) at 5/26/2023 1309  Last data filed at 5/26/2023 1225  Gross per 24 hour   Intake 1190 ml   Output 3051 ml   Net -1861 ml         Physical Exam  Constitutional:       General: She is in acute distress (2/2 to family dynamics).      Comments: Significant improvement in mentation, patient is oriented to  person place and time although she acknowledges she still having trouble retrieving words.   HENT:      Right Ear: Ear canal and external ear normal.      Left Ear: Ear canal and external ear normal.      Nose: Nose normal. No congestion or rhinorrhea.      Mouth/Throat:      Mouth: Mucous membranes are moist.   Cardiovascular:      Rate and Rhythm: Normal rate and regular rhythm.      Pulses: Normal pulses.      Heart sounds: Normal heart sounds.   Pulmonary:      Effort: No respiratory distress.      Breath sounds: Normal breath sounds. No wheezing or rales.   Abdominal:      General: There is no distension.      Tenderness: There is no abdominal tenderness. There is no rebound.   Musculoskeletal:      Cervical back: No rigidity or tenderness.      Comments: Bilateral lower extremity improved   Neurological:      Mental Status: She is disoriented.      Comments: Asterixis barely perceptible           Significant Labs: All pertinent labs within the past 24 hours have been reviewed.  Recent Lab Results  (Last 5 results in the past 24 hours)        05/26/23  1223   05/26/23  0915   05/26/23  0523   05/25/23  2116   05/25/23 2010        Albumin     2.3           Anion Gap     11     13       Baso #     0.04           Basophil %     0.6           BUN     62     59       Calcium     8.7     8.6       Chloride     99     99       CO2     26     27       Creatinine     5.6     5.1       Differential Method     Automated           eGFR     8.4     9.4       Eos #     0.2           Eosinophil %     3.6           Glucose     81     82       Gran # (ANC)     4.4           Gran %     69.2           Hematocrit     27.7           Hemoglobin     8.3           Hep B Core Total Ab     Non-reactive           Hep B S Ab     <3.00                Non-reactive  Comment: Individual is considered not immune to HBV infection.           Hepatitis B Surface Ag     Non-reactive           Immature Grans (Abs)     0.01  Comment: Mild elevation  in immature granulocytes is non specific and   can be seen in a variety of conditions including stress response,   acute inflammation, trauma and pregnancy. Correlation with other   laboratory and clinical findings is essential.             Immature Granulocytes     0.2           Lymph #     0.9           Lymph %     14.1           Magnesium     2.1     2.0       MCH     30.5           MCHC     30.0           MCV     102           Mono #     0.8           Mono %     12.3           MPV     11.5           nRBC     0           Phosphorus     4.3     3.6       Platelets     286           POCT Glucose 103   97     84         Potassium     4.4     3.8       RBC     2.72           RDW     14.0           Sodium     136     139       WBC     6.32                                  Significant Imaging: I have reviewed all pertinent imaging results/findings within the past 24 hours.

## 2023-05-26 NOTE — PLAN OF CARE
Referral sent to [facility] pt admissions for outpt HD referral to [facility] due to proximity to pt's d/c location.    SW to continue with updates.    Renuka Calloway, IKER  [facility]  X [phone]    Your fax has been successfully sent to [phone] at [phone].  ------------------------------------------------------------  From: [id number]  ------------------------------------------------------------  5/26/2023 9:13:01 AM Transmission Record          Sent to [phone] with remote ID "          Result: (0/339;2/2) Unknown          Page record: 1 - 32          Elapsed time: 10:05 on channel 49    5/26/2023 9:28:11 AM Transmission Record          Sent to [phone] with remote ID "          Result: (0/339;0/0) Success          Page record: 1 - 46          Elapsed time: 13:44 on channel 12

## 2023-05-26 NOTE — ASSESSMENT & PLAN NOTE
Last Echo 6/21 showing     The estimated ejection fraction is 55%.   The left ventricle is normal in size with normal systolic function.   Normal left ventricular diastolic function.   Normal right ventricular size with normal right ventricular systolic function.   Mild tricuspid regurgitation.   The estimated PA systolic pressure is 31 mmHg.   Normal central venous pressure (3 mmHg).    New Echo   The left ventricle is mildly enlarged with severely decreased systolic function. The estimated ejection fraction is 15%.   There is severe left ventricular global hypokinesis.   Normal right ventricular size with low normal right ventricular systolic function.   Grade I left ventricular diastolic dysfunction.   Biatrial enlargement.   Mild-to-moderate mitral regurgitation.   Severe tricuspid regurgitation.   The estimated PA systolic pressure is 43 mmHg.   Elevated central venous pressure (15 mmHg).           RHC  CVP 15  Wedge 25-32    Patient did not tolerate dobutamine, had chest pain. Dobutamine dc'd  Per cardiology the patients significant peripheral vascular disease prohibits advanced options    -d/c Torsemide    - Plan for HD session # 5/26  - Isosorbide dinitrate   - Coreg 6.25mg BID   -Reduced Hydralazine from 75 to 50 After HD will resume her regular dose as needed

## 2023-05-27 LAB
ANION GAP SERPL CALC-SCNC: 11 MMOL/L (ref 8–16)
BASOPHILS # BLD AUTO: 0.05 K/UL (ref 0–0.2)
BASOPHILS NFR BLD: 0.9 % (ref 0–1.9)
BUN SERPL-MCNC: 37 MG/DL (ref 6–20)
CALCIUM SERPL-MCNC: 8.6 MG/DL (ref 8.7–10.5)
CHLORIDE SERPL-SCNC: 102 MMOL/L (ref 95–110)
CO2 SERPL-SCNC: 23 MMOL/L (ref 23–29)
CREAT SERPL-MCNC: 4.1 MG/DL (ref 0.5–1.4)
DIFFERENTIAL METHOD: ABNORMAL
EOSINOPHIL # BLD AUTO: 0.2 K/UL (ref 0–0.5)
EOSINOPHIL NFR BLD: 3.7 % (ref 0–8)
ERYTHROCYTE [DISTWIDTH] IN BLOOD BY AUTOMATED COUNT: 13.7 % (ref 11.5–14.5)
EST. GFR  (NO RACE VARIABLE): 12.2 ML/MIN/1.73 M^2
GLUCOSE SERPL-MCNC: 104 MG/DL (ref 70–110)
HCT VFR BLD AUTO: 28 % (ref 37–48.5)
HGB BLD-MCNC: 8.2 G/DL (ref 12–16)
IMM GRANULOCYTES # BLD AUTO: 0.02 K/UL (ref 0–0.04)
IMM GRANULOCYTES NFR BLD AUTO: 0.4 % (ref 0–0.5)
LYMPHOCYTES # BLD AUTO: 1.1 K/UL (ref 1–4.8)
LYMPHOCYTES NFR BLD: 20 % (ref 18–48)
MAGNESIUM SERPL-MCNC: 1.9 MG/DL (ref 1.6–2.6)
MCH RBC QN AUTO: 29.7 PG (ref 27–31)
MCHC RBC AUTO-ENTMCNC: 29.3 G/DL (ref 32–36)
MCV RBC AUTO: 101 FL (ref 82–98)
MONOCYTES # BLD AUTO: 0.9 K/UL (ref 0.3–1)
MONOCYTES NFR BLD: 15.9 % (ref 4–15)
NEUTROPHILS # BLD AUTO: 3.2 K/UL (ref 1.8–7.7)
NEUTROPHILS NFR BLD: 59.1 % (ref 38–73)
NRBC BLD-RTO: 0 /100 WBC
PHOSPHATE SERPL-MCNC: 3.2 MG/DL (ref 2.7–4.5)
PLATELET # BLD AUTO: 258 K/UL (ref 150–450)
PMV BLD AUTO: 11.4 FL (ref 9.2–12.9)
POCT GLUCOSE: 151 MG/DL (ref 70–110)
POCT GLUCOSE: 159 MG/DL (ref 70–110)
POCT GLUCOSE: 176 MG/DL (ref 70–110)
POTASSIUM SERPL-SCNC: 3.7 MMOL/L (ref 3.5–5.1)
RBC # BLD AUTO: 2.76 M/UL (ref 4–5.4)
SODIUM SERPL-SCNC: 136 MMOL/L (ref 136–145)
WBC # BLD AUTO: 5.4 K/UL (ref 3.9–12.7)

## 2023-05-27 PROCEDURE — 99232 SBSQ HOSP IP/OBS MODERATE 35: CPT | Mod: GC,,, | Performed by: INTERNAL MEDICINE

## 2023-05-27 PROCEDURE — 25000003 PHARM REV CODE 250: Performed by: STUDENT IN AN ORGANIZED HEALTH CARE EDUCATION/TRAINING PROGRAM

## 2023-05-27 PROCEDURE — 99233 SBSQ HOSP IP/OBS HIGH 50: CPT | Mod: ,,, | Performed by: INTERNAL MEDICINE

## 2023-05-27 PROCEDURE — 84100 ASSAY OF PHOSPHORUS: CPT | Performed by: STUDENT IN AN ORGANIZED HEALTH CARE EDUCATION/TRAINING PROGRAM

## 2023-05-27 PROCEDURE — 25000003 PHARM REV CODE 250

## 2023-05-27 PROCEDURE — 94761 N-INVAS EAR/PLS OXIMETRY MLT: CPT

## 2023-05-27 PROCEDURE — 83735 ASSAY OF MAGNESIUM: CPT | Performed by: STUDENT IN AN ORGANIZED HEALTH CARE EDUCATION/TRAINING PROGRAM

## 2023-05-27 PROCEDURE — 25000003 PHARM REV CODE 250: Performed by: HOSPITALIST

## 2023-05-27 PROCEDURE — 99232 PR SUBSEQUENT HOSPITAL CARE,LEVL II: ICD-10-PCS | Mod: GC,,, | Performed by: INTERNAL MEDICINE

## 2023-05-27 PROCEDURE — 85025 COMPLETE CBC W/AUTO DIFF WBC: CPT

## 2023-05-27 PROCEDURE — 99233 PR SUBSEQUENT HOSPITAL CARE,LEVL III: ICD-10-PCS | Mod: ,,, | Performed by: INTERNAL MEDICINE

## 2023-05-27 PROCEDURE — 80048 BASIC METABOLIC PNL TOTAL CA: CPT | Performed by: STUDENT IN AN ORGANIZED HEALTH CARE EDUCATION/TRAINING PROGRAM

## 2023-05-27 PROCEDURE — 25000003 PHARM REV CODE 250: Performed by: INTERNAL MEDICINE

## 2023-05-27 PROCEDURE — 99900035 HC TECH TIME PER 15 MIN (STAT)

## 2023-05-27 PROCEDURE — 90935 HEMODIALYSIS ONE EVALUATION: CPT

## 2023-05-27 PROCEDURE — 36415 COLL VENOUS BLD VENIPUNCTURE: CPT | Performed by: STUDENT IN AN ORGANIZED HEALTH CARE EDUCATION/TRAINING PROGRAM

## 2023-05-27 PROCEDURE — 20600001 HC STEP DOWN PRIVATE ROOM

## 2023-05-27 RX ORDER — DICLOFENAC SODIUM 10 MG/G
2 GEL TOPICAL 3 TIMES DAILY PRN
Status: DISCONTINUED | OUTPATIENT
Start: 2023-05-27 | End: 2023-05-29 | Stop reason: HOSPADM

## 2023-05-27 RX ORDER — LIDOCAINE 50 MG/G
1 PATCH TOPICAL DAILY PRN
Status: DISCONTINUED | OUTPATIENT
Start: 2023-05-27 | End: 2023-05-29 | Stop reason: HOSPADM

## 2023-05-27 RX ADMIN — ISOSORBIDE DINITRATE 40 MG: 20 TABLET ORAL at 05:05

## 2023-05-27 RX ADMIN — LIDOCAINE 1 PATCH: 50 PATCH TOPICAL at 09:05

## 2023-05-27 RX ADMIN — APIXABAN 5 MG: 5 TABLET, FILM COATED ORAL at 09:05

## 2023-05-27 RX ADMIN — DICLOFENAC SODIUM 2 G: 10 GEL TOPICAL at 11:05

## 2023-05-27 RX ADMIN — ISOSORBIDE DINITRATE 40 MG: 20 TABLET ORAL at 09:05

## 2023-05-27 RX ADMIN — HYDRALAZINE HYDROCHLORIDE 50 MG: 50 TABLET ORAL at 08:05

## 2023-05-27 RX ADMIN — FAMOTIDINE 20 MG: 20 TABLET ORAL at 08:05

## 2023-05-27 RX ADMIN — ACETAMINOPHEN 1000 MG: 500 TABLET ORAL at 08:05

## 2023-05-27 RX ADMIN — HYDRALAZINE HYDROCHLORIDE 50 MG: 50 TABLET ORAL at 09:05

## 2023-05-27 RX ADMIN — CALCIUM ACETATE 1334 MG: 667 CAPSULE ORAL at 05:05

## 2023-05-27 RX ADMIN — CARVEDILOL 6.25 MG: 6.25 TABLET, FILM COATED ORAL at 09:05

## 2023-05-27 RX ADMIN — CARVEDILOL 6.25 MG: 6.25 TABLET, FILM COATED ORAL at 08:05

## 2023-05-27 RX ADMIN — HYDRALAZINE HYDROCHLORIDE 50 MG: 50 TABLET ORAL at 05:05

## 2023-05-27 RX ADMIN — CALCITRIOL CAPSULES 0.25 MCG 0.5 MCG: 0.25 CAPSULE ORAL at 08:05

## 2023-05-27 RX ADMIN — APIXABAN 5 MG: 5 TABLET, FILM COATED ORAL at 08:05

## 2023-05-27 RX ADMIN — SERTRALINE HYDROCHLORIDE 50 MG: 50 TABLET ORAL at 08:05

## 2023-05-27 RX ADMIN — ISOSORBIDE DINITRATE 40 MG: 20 TABLET ORAL at 08:05

## 2023-05-27 RX ADMIN — MUPIROCIN: 20 OINTMENT TOPICAL at 09:05

## 2023-05-27 RX ADMIN — ALLOPURINOL 50 MG: 300 TABLET ORAL at 09:05

## 2023-05-27 RX ADMIN — ATORVASTATIN CALCIUM 80 MG: 40 TABLET, FILM COATED ORAL at 08:05

## 2023-05-27 RX ADMIN — CALCIUM ACETATE 1334 MG: 667 CAPSULE ORAL at 11:05

## 2023-05-27 RX ADMIN — CLOPIDOGREL BISULFATE 75 MG: 75 TABLET ORAL at 08:05

## 2023-05-27 RX ADMIN — CALCIUM ACETATE 1334 MG: 667 CAPSULE ORAL at 08:05

## 2023-05-27 NOTE — ASSESSMENT & PLAN NOTE
Elevating BUN. Worsening insomnia.   Will discuss goals and plan with nephrology.   5/22 encephalopathic  Plan for IR tunneled line on 5/25 and HD after  5/26; Mentation improving after 1st HD session 500cc removed  5/27 :3rd HD session today, almost back to per

## 2023-05-27 NOTE — PROGRESS NOTES
[facility]  Nephrology  Progress Note    Patient Name: Suyapa Connelly  MRN: [id number]  Admission Date: 5/1/2023  Hospital Length of Stay: 24 days  Attending Provider: Ruma Schulte MD   Primary Care Physician: Magen hCristensen MD  Principal Problem:Acute on chronic combined systolic and diastolic heart failure    Subjective:     HPI: 57 y.o. female with past medical history of CAD s/p CABG, CKD, DM, HTN, HLD, s/p bilateral AKA who presnets with chest pain, difficulty breathing, leg swelling. Her most recent ECHO revealed severe systolic dysfunction with EF of 15%. She has been started on diuretics with IV lasix and PO metolazone with adequate urinary response however her renal function has been deteriorating throughout the admission. She reports requiring dialysis at one time before, around the time she underwent her AKAs. Her baseline serum creatinine is in the 2s and it has trended up to 5 during this admission. Nephrology consulted for further evaluation of ROSLYN on CKD.         Interval History: 3rd session of HD today    Review of patient's allergies indicates:   Allergen Reactions    Ciprofloxacin Itching    Contrast media      Kidney injury    Iodine      Kidney injury     Current Facility-Administered Medications   Medication Frequency    acetaminophen tablet 1,000 mg Q8H PRN    allopurinol split tablet 50 mg Every other day    apixaban tablet 5 mg BID    atorvastatin tablet 80 mg Daily    calcitRIOL capsule 0.5 mcg Daily    calcium acetate(phosphat bind) capsule 1,334 mg TID WM    carvediloL tablet 6.25 mg BID    clopidogreL tablet 75 mg Daily    dextrose 10% bolus 125 mL 125 mL PRN    dextrose 10% bolus 250 mL 250 mL PRN    dextrose 40 % gel 15,000 mg PRN    dextrose 40 % gel 30,000 mg PRN    famotidine tablet 20 mg Daily    glucagon (human recombinant) injection 1 mg PRN    heparin (porcine) injection 1,000 Units PRN    hydrALAZINE tablet 50 mg TID     HYDROmorphone tablet 2 mg BID PRN    insulin aspart U-100 pen 0-5 Units QID (AC + HS) PRN    isosorbide dinitrate tablet 40 mg TID    melatonin tablet 6 mg Nightly PRN    mupirocin 2 % ointment BID    naloxone 0.4 mg/mL injection 0.02 mg PRN    ondansetron disintegrating tablet 8 mg Q8H PRN    sertraline tablet 50 mg Daily    sodium chloride 0.9% bolus 250 mL 250 mL PRN    sodium chloride 0.9% flush 10 mL PRN       Objective:     Vital Signs (Most Recent):  Temp: 98.2 °F (36.8 °C) (05/27/23 1123)  Pulse: 84 (05/27/23 1123)  Resp: 18 (05/27/23 1123)  BP: (!) 116/56 (05/27/23 1123)  SpO2: 97 % (05/27/23 1123) Vital Signs (24h Range):  Temp:  [97.3 °F (36.3 °C)-99.2 °F (37.3 °C)] 98.2 °F (36.8 °C)  Pulse:  [77-95] 84  Resp:  [16-18] 18  SpO2:  [96 %-100 %] 97 %  BP: (114-137)/(55-68) 116/56     Weight:  (Pt bedscale not working.) (05/27/23 0519)  Body mass index is 36.39 kg/m².  Body surface area is 2.13 meters squared.    I/O last 3 completed shifts:  In: 1665 [P.O.:965; I.V.:200; Other:500]  Out: 2401 [Urine:1200; Other:1201]     Physical Exam  Constitutional:       General: She is not in acute distress.     Appearance: She is obese. She is not ill-appearing or toxic-appearing.   HENT:      Head: Normocephalic and atraumatic.   Cardiovascular:      Pulses: Normal pulses.      Heart sounds: Murmur heard.   Pulmonary:      Effort: Pulmonary effort is normal. No respiratory distress.   Abdominal:      General: Abdomen is flat.   Musculoskeletal:      Comments: Bilateral AKA   Neurological:      Mental Status: She is alert.        Significant Labs:  All labs within the past 24 hours have been reviewed.     Significant Imaging:  Labs: Reviewed    Assessment/Plan:     Cardiac/Vascular  * Acute on chronic combined systolic and diastolic heart failure  Management per primary       Renal/  Chronic kidney disease-mineral and bone disorder  Mineral Bone Disease  Lab Results   Component Value Date    .5 (H)  05/16/2023    CALCIUM 8.6 (L) 05/27/2023    CAION 0.92 (L) 05/17/2023    PHOS 3.2 05/27/2023       - Cont calcitriol 0.5mg  - Cont calcium acetate 1334mg  - Stop sevelamer 800mg TID.   - Renal diet with protein intake goal 1.5 g/kg/d with 1 L fluid restriction   - Novasource with meals  - Daily renal panel so that phos and albumin is monitored daily.       ROSLYN (acute kidney injury)  Now likely with ESRD  - ROSLYN on CKD, likely ischemic, CRS and diuresis  - Baseline serum creatinine in the 2s, up to 3.9 on admission; up to 7.9 on her most recent labs  - Retroperitoneal US with no hydronephrosis    - ECHO with severe systolic dysfunction, EF of 15%    - RHC with PCWP of 30   - patient with increase confusion, showing some mild signs of uremia.   - discussion held with family who are in agreement with starting patient on dialysis  - S/p iHD on 05/25/23.   Plan:  - iHD today for 3rd session  -  consult for HD chair placement.   - renally dose all meds  - Avoid nephrotoxic agents as much as possible    - cont torsemide 20 mg QD    Uremia  See ROSLYN        Thank you for your consult. I will follow-up with patient. Please contact us if you have any additional questions.    Erik Mario MD  Nephrology  [facility]

## 2023-05-27 NOTE — PROGRESS NOTES
[facility]rade - Cardiology [facility] Medicine  Progress Note    Patient Name: Suyapa Connelly  MRN: [id number]  Patient Class: IP- Inpatient   Admission Date: 5/1/2023  Length of Stay: 24 days  Attending Physician: Ruma Schulte MD  Primary Care Provider: Magen Christensen MD        Subjective:     Principal Problem:Acute on chronic combined systolic and diastolic heart failure        HPI:  57 y.o. female with past medical history of CAD s/p CABG, CKD, DM, HTN, HLD, s/p bilateral AKA who presnets with chest pain, difficulty breathing, leg swelling.     Per ED She has been unable to access any of her medications for the last 3 months. Over the last few months she has noted worsening shortness of breath, particulatly orthopnea, and leg swelling. In the last two weeks she endorses associated abdominal pain and swelling, reduced appetite, and pleuritic chest pain with deep inspiration. She endorses occasional lighthededness with changes in position. Last week she had an episode where she was unable to breathe for about two minutes which was very upsetting for her and her , but she recovered and did not seek care at that time. Today, she complains of significant difficulty breathing, swelling in her legs and abdomen, and reduced appetite.     No headaches, visual changes, URI symptoms, palpitations, nausea, vomiting, diarrhea, blood in her stool, dysuria, hematuria, numbness or weakness in her extremities. She does endorse occasional paresthesias associated with her chronic phantom limb pain. She additionally has had increased difficulties with depression recently, denies suicidal or homicidal ideation, but has had considerable stress and low mood.      Of note, the patient saw a new primary care provider 4/21 for similar concerns who recommended she proceed to the ED at that time, but they were unable due to financial and geographic concerns. She lives two hours away from [facility] and her  cares for her  and elderly mother with dementia. She has had decreased access to medical care over the last year, and unable to access most of her prescriptions. She had a 90 day supply of four medications (lasix, statin, clopidogrel, sertraline) but these were accidentally lost and she has been unable to afford a replacement. They present today for evaluation and to reestablish care and access to her prescriptions.     Labs on admission significant for an elevated BNP, hypocalcemia which corrected is 8.3, significant elevation in Creatinine and a slight elevation of troponin    CXR  Cardiomegaly with pulmonary interstitial edema, suggestive of pulmonary edema secondary to CHF.      Overview/Hospital Course:  Diuresis initiated with Lasix IV.  1.2L UOP in the past 24 hours.  Vitals reassuring with adequate sats on ambient air.  Still very edematious; Cardiology recs lasix gtt.  GDMT initiated. Pt given 2mg morphine for moderate to severe pain in legs from swelling. Diuresis increased to 40mg/hr due to inadequate urine output. Cr continued to increase. S/p RHC that showed wedge of 26-32 with CVP of 15. Patient with no improvement in renal function, nephrology believes this is her new baseline. On diuresis but not making much urine. Plan for IR placed tunneled cath on 5/25 with significant improvement in mentation      Interval History:  No acute events overnight, Mentation almost back to baseline, vaguely remember events from yesterday but is AOx3, will have HD today, HD setup as outpatient, first session on Wednesday of upcoming week.      Review of Systems   Constitutional:  Positive for activity change (mentation with significant improvement). Negative for chills and fever.   HENT:  Negative for rhinorrhea and sore throat.    Respiratory:  Negative for cough and shortness of breath.    Cardiovascular:  Negative for leg swelling.   Gastrointestinal:  Negative for abdominal distention and abdominal pain.   Musculoskeletal:   Negative for back pain and myalgias.   Skin:  Negative for pallor and rash.   Neurological:  Negative for tremors and speech difficulty.   Psychiatric/Behavioral:          I believe pertinent positives are related to ongoing family dynamic issues.   Objective:     Vital Signs (Most Recent):  Temp: 98.2 °F (36.8 °C) (05/27/23 1123)  Pulse: 83 (05/27/23 1445)  Resp: 18 (05/27/23 1123)  BP: (!) 110/55 (05/27/23 1445)  SpO2: 97 % (05/27/23 1123) Vital Signs (24h Range):  Temp:  [97.3 °F (36.3 °C)-99.2 °F (37.3 °C)] 98.2 °F (36.8 °C)  Pulse:  [77-95] 83  Resp:  [16-18] 18  SpO2:  [96 %-100 %] 97 %  BP: (100-137)/(51-68) 110/55     Weight:  (Pt bedscale not working.)  Body mass index is 36.39 kg/m².    Intake/Output Summary (Last 24 hours) at 5/27/2023 1454  Last data filed at 5/27/2023 0519  Gross per 24 hour   Intake 375 ml   Output 275 ml   Net 100 ml         Physical Exam        Significant Labs: All pertinent labs within the past 24 hours have been reviewed.  Recent Lab Results         05/27/23  1137   05/27/23  0503   05/26/23  2016   05/26/23  1601        Anion Gap   11     11       Baso #   0.05           Basophil %   0.9           BUN   37     33       Calcium   8.6     9.0       Chloride   102     104       CO2   23     21       Creatinine   4.1     3.5       Differential Method   Automated           eGFR   12.2     14.7       Eos #   0.2           Eosinophil %   3.7           Glucose   104     115       Gran # (ANC)   3.2           Gran %   59.1           Hematocrit   28.0           Hemoglobin   8.2           Immature Grans (Abs)   0.02  Comment: Mild elevation in immature granulocytes is non specific and   can be seen in a variety of conditions including stress response,   acute inflammation, trauma and pregnancy. Correlation with other   laboratory and clinical findings is essential.             Immature Granulocytes   0.4           Lymph #   1.1           Lymph %   20.0           Magnesium   1.9     1.9        MCH   29.7           MCHC   29.3           MCV   101           Mono #   0.9           Mono %   15.9           MPV   11.4           nRBC   0           Phosphorus   3.2     2.9       Platelets   258           POCT Glucose 159     176         Potassium   3.7     4.0       RBC   2.76           RDW   13.7           Sodium   136     136       WBC   5.40                   Significant Imaging: I have reviewed all pertinent imaging results/findings within the past 24 hours.      Assessment/Plan:      * Acute on chronic combined systolic and diastolic heart failure  Last Echo 6/21 showing     The estimated ejection fraction is 55%.   The left ventricle is normal in size with normal systolic function.   Normal left ventricular diastolic function.   Normal right ventricular size with normal right ventricular systolic function.   Mild tricuspid regurgitation.   The estimated PA systolic pressure is 31 mmHg.   Normal central venous pressure (3 mmHg).    New Echo   The left ventricle is mildly enlarged with severely decreased systolic function. The estimated ejection fraction is 15%.   There is severe left ventricular global hypokinesis.   Normal right ventricular size with low normal right ventricular systolic function.   Grade I left ventricular diastolic dysfunction.   Biatrial enlargement.   Mild-to-moderate mitral regurgitation.   Severe tricuspid regurgitation.   The estimated PA systolic pressure is 43 mmHg.   Elevated central venous pressure (15 mmHg).           RHC  CVP 15  Wedge 25-32    Patient did not tolerate dobutamine, had chest pain. Dobutamine dc'd  Per cardiology the patients significant peripheral vascular disease prohibits advanced options    -d/c Torsemide    - Plan for HD session # 5/26  - Isosorbide dinitrate   - Coreg 6.25mg BID   -Reduced Hydralazine from 75 to 50 After HD will resume her regular dose as needed            Acute metabolic encephalopathy        Uremic  encephalopathy  Significantly improved after HD. Still lingering confusion present.      Chronic kidney disease-mineral and bone disorder  Appreciate nephrology recs;   - Cont calcitriol 0.5mg  - Cont calcium acetate 1334mg  - Stop sevelamer 800mg TID.   - Renal diet with protein intake goal 1.5 g/kg/d with 1 L fluid restriction   - Novasource with meals  - Daily renal panel so that phos and albumin is monitored daily.     Dependent on hemodialysis  +      Congestive heart failure  See primary problem    Echo    Interpretation Summary  · The left ventricle is mildly enlarged with severely decreased systolic function. The estimated ejection fraction is 15%.  · There is severe left ventricular global hypokinesis.  · Normal right ventricular size with low normal right ventricular systolic function.  · Grade I left ventricular diastolic dysfunction.  · Biatrial enlargement.  · Mild-to-moderate mitral regurgitation.  · Severe tricuspid regurgitation.  · The estimated PA systolic pressure is 43 mmHg.  · Elevated central venous pressure (15 mmHg).    On Torsemide       Myalgia  Will continue to treat pain as needed      ROSLYN (acute kidney injury)  ROSLYN on CKD, likely ischemic, CRS and diuresis  Baseline serum creatinine in the 2s, up to 3.9 on admission; new baseline in the 7's   Retroperitoneal US with no hydronephrosis    ECHO with severe systolic dysfunction, EF of 15%    RHC with PCWP of 30   Plan for tunneled line and HD on 5/25  HD session #2 5/26 #3 5/27    CKD (chronic kidney disease), stage IV  History of CKD. See ROSLYN.      Above-knee amputation of right lower extremity  S/P amputation of both lower limbs     Uses self-applied continuous glucose monitoring device  POCT glucose and daily CMPs    -Restart insulin for hyperglycemia      Dermatitis associated with moisture  Large body habitus with dermatitis to folds. Will monitor for infection.    -Zinc based powder  -Topical abx if indicated      Status post above  knee amputation, left        Hypoalbuminemia  Pt with poor PO intake reported. Pt eats one meal a day and has associated nausea and vomiting. Will try antiemetics before feeds to improve intake. Will monitor daily intake and calorie replacement.     -Calorie count  -Diabetic/Renal diet  -Antiemetics before meals; Zofran 4 IV PRN  -Monitor Qtc for prolongation with antiemetics      Uremia  Elevating BUN. Worsening insomnia.   Will discuss goals and plan with nephrology.   5/22 encephalopathic  Plan for IR tunneled line on 5/25 and HD after  5/26; Mentation improving after 1st HD session 500cc removed  5/27 :3rd HD session today, almost back to per     Impaired functional mobility and endurance  PT/OT for bedbound patient      Paroxysmal atrial fibrillation  Continue home rate control  NSR on EKG              Anemia  Asymptomatic. Higher than 8 months ago. No signs of acute bleed at this time. Haptoglobin elevated.    -CBC daily  -Transfuse if <7        S/P CABG x 1  History of. Sternal scar well healed.       CAD (coronary artery disease)  History of CAD with retrosternal chest pain. EKG negative to ST changes. Nonspecific T wave inversions.     -Cardiac tele  -EKG prn  -Bidil per Cards recs  -Restarting plavix/apixaban after line placement    CAROLIN (generalized anxiety disorder)  Continue home setraline      Essential hypertension  Continue home antihypertensives      Hypocalcemia  Ionized calcium at 0.76. Ca correcting at 8.4.     -Calcium acetate for calcium repletion and for phos binding.         VTE Risk Mitigation (From admission, onward)         Ordered     apixaban tablet 5 mg  2 times daily         05/26/23 0728     heparin (porcine) injection 1,000 Units  As needed (PRN)         05/23/23 1436     IP VTE HIGH RISK PATIENT  Once         05/07/23 1547                Discharge Planning   DEVAN: 5/30/2023     Code Status: Full Code   Is the patient medically ready for discharge?: No    Reason for patient  still in hospital (select all that apply): Patient trending condition  Discharge Plan A: Home Health, Home with family   Discharge Delays: None known at this time              Norman Mackey MD  Department of Hospital Medicine   [facility] - Cardiology Stepdown

## 2023-05-27 NOTE — PROGRESS NOTES
05/27/23 1637   Post-Hemodialysis Assessment   Blood Volume Processed (Liters) 68.5 L   Dialyzer Clearance Lightly streaked   Duration of Treatment 180 minutes   Total UF (mL) 1500 mL   Net Fluid Removal 1000     HD tx completed without issue, blood returned. Pt in NAD/VSS. Report given to primary RN. Waiting on transport back to unit.

## 2023-05-27 NOTE — SUBJECTIVE & OBJECTIVE
Interval History:  No acute events overnight, Mentation almost back to baseline, vaguely remember events from yesterday but is AOx3, will have HD today, HD setup as outpatient, first session on Wednesday of upcoming week.      Review of Systems   Constitutional:  Positive for activity change (mentation with significant improvement). Negative for chills and fever.   HENT:  Negative for rhinorrhea and sore throat.    Respiratory:  Negative for cough and shortness of breath.    Cardiovascular:  Negative for leg swelling.   Gastrointestinal:  Negative for abdominal distention and abdominal pain.   Musculoskeletal:  Negative for back pain and myalgias.   Skin:  Negative for pallor and rash.   Neurological:  Negative for tremors and speech difficulty.   Psychiatric/Behavioral:          I believe pertinent positives are related to ongoing family dynamic issues.   Objective:     Vital Signs (Most Recent):  Temp: 98.2 °F (36.8 °C) (05/27/23 1123)  Pulse: 83 (05/27/23 1445)  Resp: 18 (05/27/23 1123)  BP: (!) 110/55 (05/27/23 1445)  SpO2: 97 % (05/27/23 1123) Vital Signs (24h Range):  Temp:  [97.3 °F (36.3 °C)-99.2 °F (37.3 °C)] 98.2 °F (36.8 °C)  Pulse:  [77-95] 83  Resp:  [16-18] 18  SpO2:  [96 %-100 %] 97 %  BP: (100-137)/(51-68) 110/55     Weight:  (Pt bedscale not working.)  Body mass index is 36.39 kg/m².    Intake/Output Summary (Last 24 hours) at 5/27/2023 1454  Last data filed at 5/27/2023 0519  Gross per 24 hour   Intake 375 ml   Output 275 ml   Net 100 ml         Physical Exam        Significant Labs: All pertinent labs within the past 24 hours have been reviewed.  Recent Lab Results         05/27/23  1137   05/27/23  0503   05/26/23  2016   05/26/23  1601        Anion Gap   11     11       Baso #   0.05           Basophil %   0.9           BUN   37     33       Calcium   8.6     9.0       Chloride   102     104       CO2   23     21       Creatinine   4.1     3.5       Differential Method   Automated           eGFR    12.2     14.7       Eos #   0.2           Eosinophil %   3.7           Glucose   104     115       Gran # (ANC)   3.2           Gran %   59.1           Hematocrit   28.0           Hemoglobin   8.2           Immature Grans (Abs)   0.02  Comment: Mild elevation in immature granulocytes is non specific and   can be seen in a variety of conditions including stress response,   acute inflammation, trauma and pregnancy. Correlation with other   laboratory and clinical findings is essential.             Immature Granulocytes   0.4           Lymph #   1.1           Lymph %   20.0           Magnesium   1.9     1.9       MCH   29.7           MCHC   29.3           MCV   101           Mono #   0.9           Mono %   15.9           MPV   11.4           nRBC   0           Phosphorus   3.2     2.9       Platelets   258           POCT Glucose 159     176         Potassium   3.7     4.0       RBC   2.76           RDW   13.7           Sodium   136     136       WBC   5.40                   Significant Imaging: I have reviewed all pertinent imaging results/findings within the past 24 hours.

## 2023-05-27 NOTE — ASSESSMENT & PLAN NOTE
Now likely with ESRD  - ROSLYN on CKD, likely ischemic, CRS and diuresis  - Baseline serum creatinine in the 2s, up to 3.9 on admission; up to 7.9 on her most recent labs  - Retroperitoneal US with no hydronephrosis    - ECHO with severe systolic dysfunction, EF of 15%    - RHC with PCWP of 30   - patient with increase confusion, showing some mild signs of uremia.   - discussion held with family who are in agreement with starting patient on dialysis  - S/p iHD on 05/25/23.   Plan:  - iHD today for 3rd session  -  consult for HD chair placement.   - renally dose all meds  - Avoid nephrotoxic agents as much as possible    - cont torsemide 20 mg QD   Minoxidil Counseling: Minoxidil is a topical medication which can increase blood flow where it is applied. It is uncertain how this medication increases hair growth. Side effects are uncommon and include stinging and allergic reactions.

## 2023-05-27 NOTE — ASSESSMENT & PLAN NOTE
See primary problem    Echo    Interpretation Summary  · The left ventricle is mildly enlarged with severely decreased systolic function. The estimated ejection fraction is 15%.  · There is severe left ventricular global hypokinesis.  · Normal right ventricular size with low normal right ventricular systolic function.  · Grade I left ventricular diastolic dysfunction.  · Biatrial enlargement.  · Mild-to-moderate mitral regurgitation.  · Severe tricuspid regurgitation.  · The estimated PA systolic pressure is 43 mmHg.  · Elevated central venous pressure (15 mmHg).    On Torsemide

## 2023-05-27 NOTE — SUBJECTIVE & OBJECTIVE
Interval History: 3rd session of HD today    Review of patient's allergies indicates:   Allergen Reactions    Ciprofloxacin Itching    Contrast media      Kidney injury    Iodine      Kidney injury     Current Facility-Administered Medications   Medication Frequency    acetaminophen tablet 1,000 mg Q8H PRN    allopurinol split tablet 50 mg Every other day    apixaban tablet 5 mg BID    atorvastatin tablet 80 mg Daily    calcitRIOL capsule 0.5 mcg Daily    calcium acetate(phosphat bind) capsule 1,334 mg TID WM    carvediloL tablet 6.25 mg BID    clopidogreL tablet 75 mg Daily    dextrose 10% bolus 125 mL 125 mL PRN    dextrose 10% bolus 250 mL 250 mL PRN    dextrose 40 % gel 15,000 mg PRN    dextrose 40 % gel 30,000 mg PRN    famotidine tablet 20 mg Daily    glucagon (human recombinant) injection 1 mg PRN    heparin (porcine) injection 1,000 Units PRN    hydrALAZINE tablet 50 mg TID    HYDROmorphone tablet 2 mg BID PRN    insulin aspart U-100 pen 0-5 Units QID (AC + HS) PRN    isosorbide dinitrate tablet 40 mg TID    melatonin tablet 6 mg Nightly PRN    mupirocin 2 % ointment BID    naloxone 0.4 mg/mL injection 0.02 mg PRN    ondansetron disintegrating tablet 8 mg Q8H PRN    sertraline tablet 50 mg Daily    sodium chloride 0.9% bolus 250 mL 250 mL PRN    sodium chloride 0.9% flush 10 mL PRN       Objective:     Vital Signs (Most Recent):  Temp: 98.2 °F (36.8 °C) (05/27/23 1123)  Pulse: 84 (05/27/23 1123)  Resp: 18 (05/27/23 1123)  BP: (!) 116/56 (05/27/23 1123)  SpO2: 97 % (05/27/23 1123) Vital Signs (24h Range):  Temp:  [97.3 °F (36.3 °C)-99.2 °F (37.3 °C)] 98.2 °F (36.8 °C)  Pulse:  [77-95] 84  Resp:  [16-18] 18  SpO2:  [96 %-100 %] 97 %  BP: (114-137)/(55-68) 116/56     Weight:  (Pt bedscale not working.) (05/27/23 0519)  Body mass index is 36.39 kg/m².  Body surface area is 2.13 meters squared.    I/O last 3 completed shifts:  In: 1665 [P.O.:965; I.V.:200; Other:500]  Out: 2401 [Urine:1200; Other:1201]      Physical Exam  Constitutional:       General: She is not in acute distress.     Appearance: She is obese. She is not ill-appearing or toxic-appearing.   HENT:      Head: Normocephalic and atraumatic.   Cardiovascular:      Pulses: Normal pulses.      Heart sounds: Murmur heard.   Pulmonary:      Effort: Pulmonary effort is normal. No respiratory distress.   Abdominal:      General: Abdomen is flat.   Musculoskeletal:      Comments: Bilateral AKA   Neurological:      Mental Status: She is alert.        Significant Labs:  All labs within the past 24 hours have been reviewed.     Significant Imaging:  Labs: Reviewed

## 2023-05-27 NOTE — ASSESSMENT & PLAN NOTE
Mineral Bone Disease  Lab Results   Component Value Date    .5 (H) 05/16/2023    CALCIUM 8.6 (L) 05/27/2023    CAION 0.92 (L) 05/17/2023    PHOS 3.2 05/27/2023       - Cont calcitriol 0.5mg  - Cont calcium acetate 1334mg  - Stop sevelamer 800mg TID.   - Renal diet with protein intake goal 1.5 g/kg/d with 1 L fluid restriction   - Novasource with meals  - Daily renal panel so that phos and albumin is monitored daily.

## 2023-05-27 NOTE — ASSESSMENT & PLAN NOTE
ROSLYN on CKD, likely ischemic, CRS and diuresis  Baseline serum creatinine in the 2s, up to 3.9 on admission; new baseline in the 7's   Retroperitoneal US with no hydronephrosis    ECHO with severe systolic dysfunction, EF of 15%    RHC with PCWP of 30   Plan for tunneled line and HD on 5/25  HD session #2 5/26 #3 5/27

## 2023-05-28 LAB
ALBUMIN SERPL BCP-MCNC: 2.5 G/DL (ref 3.5–5.2)
ANION GAP SERPL CALC-SCNC: 10 MMOL/L (ref 8–16)
BASOPHILS # BLD AUTO: 0.06 K/UL (ref 0–0.2)
BASOPHILS NFR BLD: 1 % (ref 0–1.9)
BUN SERPL-MCNC: 18 MG/DL (ref 6–20)
CALCIUM SERPL-MCNC: 9.3 MG/DL (ref 8.7–10.5)
CHLORIDE SERPL-SCNC: 104 MMOL/L (ref 95–110)
CO2 SERPL-SCNC: 22 MMOL/L (ref 23–29)
CREAT SERPL-MCNC: 2.8 MG/DL (ref 0.5–1.4)
DIFFERENTIAL METHOD: ABNORMAL
EOSINOPHIL # BLD AUTO: 0.3 K/UL (ref 0–0.5)
EOSINOPHIL NFR BLD: 4.1 % (ref 0–8)
ERYTHROCYTE [DISTWIDTH] IN BLOOD BY AUTOMATED COUNT: 13.8 % (ref 11.5–14.5)
EST. GFR  (NO RACE VARIABLE): 19.2 ML/MIN/1.73 M^2
GLUCOSE SERPL-MCNC: 121 MG/DL (ref 70–110)
HCT VFR BLD AUTO: 31.4 % (ref 37–48.5)
HGB BLD-MCNC: 9.2 G/DL (ref 12–16)
IMM GRANULOCYTES # BLD AUTO: 0.02 K/UL (ref 0–0.04)
IMM GRANULOCYTES NFR BLD AUTO: 0.3 % (ref 0–0.5)
LYMPHOCYTES # BLD AUTO: 1.2 K/UL (ref 1–4.8)
LYMPHOCYTES NFR BLD: 19.1 % (ref 18–48)
MCH RBC QN AUTO: 30.5 PG (ref 27–31)
MCHC RBC AUTO-ENTMCNC: 29.3 G/DL (ref 32–36)
MCV RBC AUTO: 104 FL (ref 82–98)
MONOCYTES # BLD AUTO: 0.7 K/UL (ref 0.3–1)
MONOCYTES NFR BLD: 10.7 % (ref 4–15)
NEUTROPHILS # BLD AUTO: 4.1 K/UL (ref 1.8–7.7)
NEUTROPHILS NFR BLD: 64.8 % (ref 38–73)
NRBC BLD-RTO: 0 /100 WBC
PHOSPHATE SERPL-MCNC: 2.5 MG/DL (ref 2.7–4.5)
PLATELET # BLD AUTO: 292 K/UL (ref 150–450)
PMV BLD AUTO: 11 FL (ref 9.2–12.9)
POCT GLUCOSE: 110 MG/DL (ref 70–110)
POCT GLUCOSE: 114 MG/DL (ref 70–110)
POCT GLUCOSE: 165 MG/DL (ref 70–110)
POCT GLUCOSE: 185 MG/DL (ref 70–110)
POTASSIUM SERPL-SCNC: 4.1 MMOL/L (ref 3.5–5.1)
RBC # BLD AUTO: 3.02 M/UL (ref 4–5.4)
SODIUM SERPL-SCNC: 136 MMOL/L (ref 136–145)
WBC # BLD AUTO: 6.27 K/UL (ref 3.9–12.7)

## 2023-05-28 PROCEDURE — 25000003 PHARM REV CODE 250: Performed by: STUDENT IN AN ORGANIZED HEALTH CARE EDUCATION/TRAINING PROGRAM

## 2023-05-28 PROCEDURE — 99232 SBSQ HOSP IP/OBS MODERATE 35: CPT | Mod: GC,,, | Performed by: INTERNAL MEDICINE

## 2023-05-28 PROCEDURE — 99232 PR SUBSEQUENT HOSPITAL CARE,LEVL II: ICD-10-PCS | Mod: GC,,, | Performed by: INTERNAL MEDICINE

## 2023-05-28 PROCEDURE — 36415 COLL VENOUS BLD VENIPUNCTURE: CPT | Performed by: INTERNAL MEDICINE

## 2023-05-28 PROCEDURE — 85025 COMPLETE CBC W/AUTO DIFF WBC: CPT

## 2023-05-28 PROCEDURE — 20600001 HC STEP DOWN PRIVATE ROOM

## 2023-05-28 PROCEDURE — C1752 CATH,HEMODIALYSIS,SHORT-TERM: HCPCS

## 2023-05-28 PROCEDURE — 80069 RENAL FUNCTION PANEL: CPT | Performed by: INTERNAL MEDICINE

## 2023-05-28 PROCEDURE — 51798 US URINE CAPACITY MEASURE: CPT

## 2023-05-28 PROCEDURE — 25000003 PHARM REV CODE 250: Performed by: INTERNAL MEDICINE

## 2023-05-28 PROCEDURE — 25000003 PHARM REV CODE 250

## 2023-05-28 PROCEDURE — 25000003 PHARM REV CODE 250: Performed by: HOSPITALIST

## 2023-05-28 RX ORDER — SODIUM CHLORIDE 9 MG/ML
INJECTION, SOLUTION INTRAVENOUS ONCE
Status: COMPLETED | OUTPATIENT
Start: 2023-05-29 | End: 2023-05-29

## 2023-05-28 RX ADMIN — SERTRALINE HYDROCHLORIDE 50 MG: 50 TABLET ORAL at 09:05

## 2023-05-28 RX ADMIN — CARVEDILOL 6.25 MG: 6.25 TABLET, FILM COATED ORAL at 09:05

## 2023-05-28 RX ADMIN — CALCIUM ACETATE 1334 MG: 667 CAPSULE ORAL at 05:05

## 2023-05-28 RX ADMIN — ISOSORBIDE DINITRATE 40 MG: 20 TABLET ORAL at 03:05

## 2023-05-28 RX ADMIN — APIXABAN 5 MG: 5 TABLET, FILM COATED ORAL at 09:05

## 2023-05-28 RX ADMIN — DIBASIC SODIUM PHOSPHATE, MONOBASIC POTASSIUM PHOSPHATE AND MONOBASIC SODIUM PHOSPHATE 2 TABLET: 852; 155; 130 TABLET ORAL at 08:05

## 2023-05-28 RX ADMIN — MUPIROCIN: 20 OINTMENT TOPICAL at 09:05

## 2023-05-28 RX ADMIN — ATORVASTATIN CALCIUM 80 MG: 40 TABLET, FILM COATED ORAL at 09:05

## 2023-05-28 RX ADMIN — FAMOTIDINE 20 MG: 20 TABLET ORAL at 09:05

## 2023-05-28 RX ADMIN — Medication 6 MG: at 10:05

## 2023-05-28 RX ADMIN — CALCIUM ACETATE 1334 MG: 667 CAPSULE ORAL at 12:05

## 2023-05-28 RX ADMIN — DIBASIC SODIUM PHOSPHATE, MONOBASIC POTASSIUM PHOSPHATE AND MONOBASIC SODIUM PHOSPHATE 2 TABLET: 852; 155; 130 TABLET ORAL at 09:05

## 2023-05-28 RX ADMIN — CLOPIDOGREL BISULFATE 75 MG: 75 TABLET ORAL at 09:05

## 2023-05-28 RX ADMIN — APIXABAN 5 MG: 5 TABLET, FILM COATED ORAL at 08:05

## 2023-05-28 RX ADMIN — HYDRALAZINE HYDROCHLORIDE 50 MG: 50 TABLET ORAL at 03:05

## 2023-05-28 RX ADMIN — ISOSORBIDE DINITRATE 40 MG: 20 TABLET ORAL at 09:05

## 2023-05-28 RX ADMIN — CALCITRIOL CAPSULES 0.25 MCG 0.5 MCG: 0.25 CAPSULE ORAL at 09:05

## 2023-05-28 RX ADMIN — CARVEDILOL 6.25 MG: 6.25 TABLET, FILM COATED ORAL at 08:05

## 2023-05-28 RX ADMIN — HYDRALAZINE HYDROCHLORIDE 50 MG: 50 TABLET ORAL at 09:05

## 2023-05-28 RX ADMIN — CALCIUM ACETATE 1334 MG: 667 CAPSULE ORAL at 07:05

## 2023-05-28 RX ADMIN — ISOSORBIDE DINITRATE 40 MG: 20 TABLET ORAL at 08:05

## 2023-05-28 NOTE — ASSESSMENT & PLAN NOTE
History of CAD with retrosternal chest pain. EKG negative to ST changes. Nonspecific T wave inversions.     -Cardiac tele  -EKG prn  -Bidil per Cards recs  -Restarting plavix/apixaban after line placement   nondistended/nontender

## 2023-05-28 NOTE — PROGRESS NOTES
Fall bundle in place. Pt. Remained free from falls/trauma/injuries. No complaints of CP/ SOB/discomfort. VSS. Tele, NSR. A&Ox4. Pt. Reports pain this shift, applied Lidocaine patch to chest & L lower limb. Diclofenac sodium gel was applied to both limbs for pain as well. Both these pain meds approved by physician Sunny Martino DO. Pts' snow removed on day shift & voided prior top shift change, by midnight pt. Still has not produced any urine. Primary RN bladder scanned pt., 5 cc was displayed. Pt. Still did not void this AM & was bladder scanned once again, 91 cc displayed. Team informed @ 5035. The POC reviewed w/ pt. &  @ bedside, questions were encouraged & answered. No further concerns at this time.

## 2023-05-28 NOTE — ASSESSMENT & PLAN NOTE
Elevating BUN. Worsening insomnia.   Will discuss goals and plan with nephrology.   5/22 encephalopathic  Plan for IR tunneled line on 5/25 and HD after  5/26; Mentation improving after 1st HD session 500cc removed  5/27 :3rd HD session today,   5/28 #4 HD, BUN back to normal, mentation appears to be at baseline although she reports some lingering mental fogginess

## 2023-05-28 NOTE — PROGRESS NOTES
[facility] - Cardiology [facility] Medicine  Progress Note    Patient Name: Suyapa Connelly  MRN: [id number]  Patient Class: IP- Inpatient   Admission Date: 5/1/2023  Length of Stay: 25 days  Attending Physician: Ruma Schulte MD  Primary Care Provider: Magen Christensen MD        Subjective:     Principal Problem:Acute on chronic combined systolic and diastolic heart failure        HPI:  57 y.o. female with past medical history of CAD s/p CABG, CKD, DM, HTN, HLD, s/p bilateral AKA who presnets with chest pain, difficulty breathing, leg swelling.     Per ED She has been unable to access any of her medications for the last 3 months. Over the last few months she has noted worsening shortness of breath, particulatly orthopnea, and leg swelling. In the last two weeks she endorses associated abdominal pain and swelling, reduced appetite, and pleuritic chest pain with deep inspiration. She endorses occasional lighthededness with changes in position. Last week she had an episode where she was unable to breathe for about two minutes which was very upsetting for her and her , but she recovered and did not seek care at that time. Today, she complains of significant difficulty breathing, swelling in her legs and abdomen, and reduced appetite.     No headaches, visual changes, URI symptoms, palpitations, nausea, vomiting, diarrhea, blood in her stool, dysuria, hematuria, numbness or weakness in her extremities. She does endorse occasional paresthesias associated with her chronic phantom limb pain. She additionally has had increased difficulties with depression recently, denies suicidal or homicidal ideation, but has had considerable stress and low mood.      Of note, the patient saw a new primary care provider 4/21 for similar concerns who recommended she proceed to the ED at that time, but they were unable due to financial and geographic concerns. She lives two hours away from [facility] and her  cares for her  and elderly mother with dementia. She has had decreased access to medical care over the last year, and unable to access most of her prescriptions. She had a 90 day supply of four medications (lasix, statin, clopidogrel, sertraline) but these were accidentally lost and she has been unable to afford a replacement. They present today for evaluation and to reestablish care and access to her prescriptions.     Labs on admission significant for an elevated BNP, hypocalcemia which corrected is 8.3, significant elevation in Creatinine and a slight elevation of troponin    CXR  Cardiomegaly with pulmonary interstitial edema, suggestive of pulmonary edema secondary to CHF.      Overview/Hospital Course:  Diuresis initiated with Lasix IV.  1.2L UOP in the past 24 hours.  Vitals reassuring with adequate sats on ambient air.  Still very edematious; Cardiology recs lasix gtt.  GDMT initiated. Pt given 2mg morphine for moderate to severe pain in legs from swelling. Diuresis increased to 40mg/hr due to inadequate urine output. Cr continued to increase. S/p RHC that showed wedge of 26-32 with CVP of 15. Patient with no improvement in renal function, nephrology believes this is her new baseline. On diuresis but not making much urine. Plan for IR placed tunneled cath on 5/25 with significant improvement in mentation      Interval History: NAEON. BUN back to normal, will have HD today and tomorrow, potential d/c after HD once HH and DME is secured.    Review of Systems   Constitutional:  Positive for activity change (almost at baseline however, she is cognisant that she is still forgetting events from the past 48 hrs). Negative for chills and fever.   HENT:  Negative for rhinorrhea and sore throat.    Respiratory:  Negative for cough and shortness of breath.    Cardiovascular:  Negative for leg swelling.   Gastrointestinal:  Negative for abdominal distention, abdominal pain, nausea and vomiting.   Musculoskeletal:  Negative for back  pain and myalgias.   Skin:  Negative for pallor and rash.   Neurological:  Negative for tremors and speech difficulty.   Psychiatric/Behavioral:  Positive for agitation (not agitated but emotional due to everything she has gone through from a medical and family dynamics) and confusion.    Objective:     Vital Signs (Most Recent):  Temp: 97.9 °F (36.6 °C) (05/28/23 1142)  Pulse: 83 (05/28/23 1500)  Resp: 19 (05/28/23 1142)  BP: 126/60 (05/28/23 1142)  SpO2: 95 % (05/28/23 1142) Vital Signs (24h Range):  Temp:  [97.5 °F (36.4 °C)-98.3 °F (36.8 °C)] 97.9 °F (36.6 °C)  Pulse:  [75-92] 83  Resp:  [18-19] 19  SpO2:  [95 %-98 %] 95 %  BP: (116-139)/(54-64) 126/60     Weight:  (Pt bedscale not working.)  Body mass index is 36.39 kg/m².    Intake/Output Summary (Last 24 hours) at 5/28/2023 1509  Last data filed at 5/28/2023 0550  Gross per 24 hour   Intake 480 ml   Output 1700 ml   Net -1220 ml         Physical Exam  Constitutional:       General: She is not in acute distress.     Appearance: She is obese. She is not ill-appearing or toxic-appearing.   HENT:      Head: Normocephalic and atraumatic.   Cardiovascular:      Pulses: Normal pulses.      Heart sounds: Murmur heard.   Pulmonary:      Effort: Pulmonary effort is normal. No respiratory distress.   Abdominal:      General: Abdomen is flat.   Musculoskeletal:      Comments: Bilateral AKA   Neurological:      Mental Status: She is alert.           Significant Labs: All pertinent labs within the past 24 hours have been reviewed.  Recent Lab Results         05/28/23  1153   05/28/23  0733   05/28/23  0619   05/27/23  1944        Albumin     2.5         Anion Gap     10         Baso #     0.06         Basophil %     1.0         BUN     18         Calcium     9.3         Chloride     104         CO2     22         Creatinine     2.8         Differential Method     Automated         eGFR     19.2         Eos #     0.3         Eosinophil %     4.1         Glucose     121          Gran # (ANC)     4.1         Gran %     64.8         Hematocrit     31.4         Hemoglobin     9.2         Immature Grans (Abs)     0.02  Comment: Mild elevation in immature granulocytes is non specific and   can be seen in a variety of conditions including stress response,   acute inflammation, trauma and pregnancy. Correlation with other   laboratory and clinical findings is essential.           Immature Granulocytes     0.3         Lymph #     1.2         Lymph %     19.1         MCH     30.5         MCHC     29.3         MCV     104         Mono #     0.7         Mono %     10.7         MPV     11.0         nRBC     0         Phosphorus     2.5         Platelets     292         POCT Glucose 185   114     151       Potassium     4.1         RBC     3.02         RDW     13.8         Sodium     136         WBC     6.27                 Significant Imaging: I have reviewed all pertinent imaging results/findings within the past 24 hours.      Assessment/Plan:      * Acute on chronic combined systolic and diastolic heart failure  Last Echo 6/21 showing     The estimated ejection fraction is 55%.   The left ventricle is normal in size with normal systolic function.   Normal left ventricular diastolic function.   Normal right ventricular size with normal right ventricular systolic function.   Mild tricuspid regurgitation.   The estimated PA systolic pressure is 31 mmHg.   Normal central venous pressure (3 mmHg).    New Echo   The left ventricle is mildly enlarged with severely decreased systolic function. The estimated ejection fraction is 15%.   There is severe left ventricular global hypokinesis.   Normal right ventricular size with low normal right ventricular systolic function.   Grade I left ventricular diastolic dysfunction.   Biatrial enlargement.   Mild-to-moderate mitral regurgitation.   Severe tricuspid regurgitation.   The estimated PA systolic pressure is 43 mmHg.   Elevated central venous  pressure (15 mmHg).           RHC  CVP 15  Wedge 25-32    Patient did not tolerate dobutamine, had chest pain. Dobutamine dc'd  Per cardiology the patients significant peripheral vascular disease prohibits advanced options    -d/c Torsemide    - Plan for HD session # 5/26  - Isosorbide dinitrate   - Coreg 6.25mg BID   -Reduced Hydralazine from 75 to 50 After HD will resume her regular dose as needed            Acute metabolic encephalopathy        Uremic encephalopathy  Significantly improved after HD. Still lingering confusion present.      Chronic kidney disease-mineral and bone disorder  Appreciate nephrology recs;   - Cont calcitriol 0.5mg  - Cont calcium acetate 1334mg  - Stop sevelamer 800mg TID.   - Renal diet with protein intake goal 1.5 g/kg/d with 1 L fluid restriction   - Novasource with meals  - Daily renal panel so that phos and albumin is monitored daily.     Dependent on hemodialysis  +      Congestive heart failure  See primary problem    Echo    Interpretation Summary  · The left ventricle is mildly enlarged with severely decreased systolic function. The estimated ejection fraction is 15%.  · There is severe left ventricular global hypokinesis.  · Normal right ventricular size with low normal right ventricular systolic function.  · Grade I left ventricular diastolic dysfunction.  · Biatrial enlargement.  · Mild-to-moderate mitral regurgitation.  · Severe tricuspid regurgitation.  · The estimated PA systolic pressure is 43 mmHg.  · Elevated central venous pressure (15 mmHg).    On Torsemide       Myalgia  Will continue to treat pain as needed      ROSLYN (acute kidney injury)  ROSLYN on CKD, likely ischemic, CRS and diuresis  Baseline serum creatinine in the 2s, up to 3.9 on admission; new baseline in the 7's   Retroperitoneal US with no hydronephrosis    ECHO with severe systolic dysfunction, EF of 15%    RHC with PCWP of 30   Plan for tunneled line and HD on 5/25  Continue HD while inpatient      CKD  (chronic kidney disease), stage IV  History of CKD. See ROSLYN.      Above-knee amputation of right lower extremity  S/P amputation of both lower limbs     Uses self-applied continuous glucose monitoring device  POCT glucose and daily CMPs    -Restart insulin for hyperglycemia      Dermatitis associated with moisture  Large body habitus with dermatitis to folds. Will monitor for infection.    -Zinc based powder  -Topical abx if indicated      Status post above knee amputation, left        Hypoalbuminemia  Pt with poor PO intake reported. Pt eats one meal a day and has associated nausea and vomiting. Will try antiemetics before feeds to improve intake. Will monitor daily intake and calorie replacement.     -Calorie count  -Diabetic/Renal diet  -Antiemetics before meals; Zofran 4 IV PRN  -Monitor Qtc for prolongation with antiemetics      Uremia  Elevating BUN. Worsening insomnia.   Will discuss goals and plan with nephrology.   5/22 encephalopathic  Plan for IR tunneled line on 5/25 and HD after  5/26; Mentation improving after 1st HD session 500cc removed  5/27 :3rd HD session today,   5/28 #4 HD, BUN back to normal, mentation appears to be at baseline although she reports some lingering mental fogginess     Impaired functional mobility and endurance  PT/OT for bedbound patient      Paroxysmal atrial fibrillation  Continue home rate control  NSR on EKG              Anemia  Asymptomatic. Higher than 8 months ago. No signs of acute bleed at this time. Haptoglobin elevated.    -CBC daily  -Transfuse if <7        S/P CABG x 1  History of. Sternal scar well healed.       CAD (coronary artery disease)  History of CAD with retrosternal chest pain. EKG negative to ST changes. Nonspecific T wave inversions.     -Cardiac tele  -EKG prn  -Bidil per Cards recs  -Restarting plavix/apixaban after line placement    CAROLIN (generalized anxiety disorder)  Continue home setraline      Essential hypertension  Continue home  antihypertensives      Hypocalcemia  Ionized calcium at 0.76. Ca correcting at 8.4.     -Calcium acetate for calcium repletion and for phos binding.         VTE Risk Mitigation (From admission, onward)         Ordered     apixaban tablet 5 mg  2 times daily         05/26/23 0728     heparin (porcine) injection 1,000 Units  As needed (PRN)         05/23/23 1436     IP VTE HIGH RISK PATIENT  Once         05/07/23 1547                Discharge Planning   DEVAN: 5/30/2023     Code Status: Full Code   Is the patient medically ready for discharge?: No    Reason for patient still in hospital (select all that apply): Patient trending condition  Discharge Plan A: Home Health, Home with family   Discharge Delays: None known at this time              Norman Mackey MD  Department of Hospital Medicine   [facility] - Cardiology Stepdown

## 2023-05-28 NOTE — ASSESSMENT & PLAN NOTE
ROSLYN on CKD, likely ischemic, CRS and diuresis  Baseline serum creatinine in the 2s, up to 3.9 on admission; new baseline in the 7's   Retroperitoneal US with no hydronephrosis    ECHO with severe systolic dysfunction, EF of 15%    RHC with PCWP of 30   Plan for tunneled line and HD on 5/25  Continue HD while inpatient

## 2023-05-28 NOTE — SUBJECTIVE & OBJECTIVE
Interval History: NAEON. BUN back to normal, will have HD today and tomorrow, potential d/c after HD once HH and DME is secured.    Review of Systems   Constitutional:  Positive for activity change (almost at baseline however, she is cognisant that she is still forgetting events from the past 48 hrs). Negative for chills and fever.   HENT:  Negative for rhinorrhea and sore throat.    Respiratory:  Negative for cough and shortness of breath.    Cardiovascular:  Negative for leg swelling.   Gastrointestinal:  Negative for abdominal distention, abdominal pain, nausea and vomiting.   Musculoskeletal:  Negative for back pain and myalgias.   Skin:  Negative for pallor and rash.   Neurological:  Negative for tremors and speech difficulty.   Psychiatric/Behavioral:  Positive for agitation (not agitated but emotional due to everything she has gone through from a medical and family dynamics) and confusion.    Objective:     Vital Signs (Most Recent):  Temp: 97.9 °F (36.6 °C) (05/28/23 1142)  Pulse: 83 (05/28/23 1500)  Resp: 19 (05/28/23 1142)  BP: 126/60 (05/28/23 1142)  SpO2: 95 % (05/28/23 1142) Vital Signs (24h Range):  Temp:  [97.5 °F (36.4 °C)-98.3 °F (36.8 °C)] 97.9 °F (36.6 °C)  Pulse:  [75-92] 83  Resp:  [18-19] 19  SpO2:  [95 %-98 %] 95 %  BP: (116-139)/(54-64) 126/60     Weight:  (Pt bedscale not working.)  Body mass index is 36.39 kg/m².    Intake/Output Summary (Last 24 hours) at 5/28/2023 1509  Last data filed at 5/28/2023 0550  Gross per 24 hour   Intake 480 ml   Output 1700 ml   Net -1220 ml         Physical Exam  Constitutional:       General: She is not in acute distress.     Appearance: She is obese. She is not ill-appearing or toxic-appearing.   HENT:      Head: Normocephalic and atraumatic.   Cardiovascular:      Pulses: Normal pulses.      Heart sounds: Murmur heard.   Pulmonary:      Effort: Pulmonary effort is normal. No respiratory distress.   Abdominal:      General: Abdomen is flat.    Musculoskeletal:      Comments: Bilateral AKA   Neurological:      Mental Status: She is alert.           Significant Labs: All pertinent labs within the past 24 hours have been reviewed.  Recent Lab Results         05/28/23  1153   05/28/23  0733   05/28/23  0619   05/27/23  1944        Albumin     2.5         Anion Gap     10         Baso #     0.06         Basophil %     1.0         BUN     18         Calcium     9.3         Chloride     104         CO2     22         Creatinine     2.8         Differential Method     Automated         eGFR     19.2         Eos #     0.3         Eosinophil %     4.1         Glucose     121         Gran # (ANC)     4.1         Gran %     64.8         Hematocrit     31.4         Hemoglobin     9.2         Immature Grans (Abs)     0.02  Comment: Mild elevation in immature granulocytes is non specific and   can be seen in a variety of conditions including stress response,   acute inflammation, trauma and pregnancy. Correlation with other   laboratory and clinical findings is essential.           Immature Granulocytes     0.3         Lymph #     1.2         Lymph %     19.1         MCH     30.5         MCHC     29.3         MCV     104         Mono #     0.7         Mono %     10.7         MPV     11.0         nRBC     0         Phosphorus     2.5         Platelets     292         POCT Glucose 185   114     151       Potassium     4.1         RBC     3.02         RDW     13.8         Sodium     136         WBC     6.27                 Significant Imaging: I have reviewed all pertinent imaging results/findings within the past 24 hours.

## 2023-05-29 VITALS
SYSTOLIC BLOOD PRESSURE: 122 MMHG | TEMPERATURE: 99 F | RESPIRATION RATE: 20 BRPM | HEIGHT: 65 IN | BODY MASS INDEX: 36.44 KG/M2 | DIASTOLIC BLOOD PRESSURE: 83 MMHG | OXYGEN SATURATION: 98 % | WEIGHT: 218.69 LBS | HEART RATE: 86 BPM

## 2023-05-29 LAB
ALBUMIN SERPL BCP-MCNC: 2.5 G/DL (ref 3.5–5.2)
ANION GAP SERPL CALC-SCNC: 11 MMOL/L (ref 8–16)
BASOPHILS # BLD AUTO: 0.06 K/UL (ref 0–0.2)
BASOPHILS NFR BLD: 0.8 % (ref 0–1.9)
BUN SERPL-MCNC: 28 MG/DL (ref 6–20)
CALCIUM SERPL-MCNC: 9.3 MG/DL (ref 8.7–10.5)
CHLORIDE SERPL-SCNC: 105 MMOL/L (ref 95–110)
CO2 SERPL-SCNC: 24 MMOL/L (ref 23–29)
CREAT SERPL-MCNC: 3.7 MG/DL (ref 0.5–1.4)
DIFFERENTIAL METHOD: ABNORMAL
EOSINOPHIL # BLD AUTO: 0.4 K/UL (ref 0–0.5)
EOSINOPHIL NFR BLD: 5.2 % (ref 0–8)
ERYTHROCYTE [DISTWIDTH] IN BLOOD BY AUTOMATED COUNT: 13.9 % (ref 11.5–14.5)
EST. GFR  (NO RACE VARIABLE): 13.8 ML/MIN/1.73 M^2
GLUCOSE SERPL-MCNC: 116 MG/DL (ref 70–110)
HCT VFR BLD AUTO: 29.4 % (ref 37–48.5)
HGB BLD-MCNC: 8.6 G/DL (ref 12–16)
IMM GRANULOCYTES # BLD AUTO: 0.02 K/UL (ref 0–0.04)
IMM GRANULOCYTES NFR BLD AUTO: 0.3 % (ref 0–0.5)
LYMPHOCYTES # BLD AUTO: 1.3 K/UL (ref 1–4.8)
LYMPHOCYTES NFR BLD: 17.9 % (ref 18–48)
MCH RBC QN AUTO: 30.1 PG (ref 27–31)
MCHC RBC AUTO-ENTMCNC: 29.3 G/DL (ref 32–36)
MCV RBC AUTO: 103 FL (ref 82–98)
MONOCYTES # BLD AUTO: 0.9 K/UL (ref 0.3–1)
MONOCYTES NFR BLD: 12 % (ref 4–15)
NEUTROPHILS # BLD AUTO: 4.6 K/UL (ref 1.8–7.7)
NEUTROPHILS NFR BLD: 63.8 % (ref 38–73)
NRBC BLD-RTO: 0 /100 WBC
PHOSPHATE SERPL-MCNC: 4.2 MG/DL (ref 2.7–4.5)
PLATELET # BLD AUTO: 265 K/UL (ref 150–450)
PMV BLD AUTO: 10.8 FL (ref 9.2–12.9)
POCT GLUCOSE: 133 MG/DL (ref 70–110)
POCT GLUCOSE: 146 MG/DL (ref 70–110)
POTASSIUM SERPL-SCNC: 4.2 MMOL/L (ref 3.5–5.1)
RBC # BLD AUTO: 2.86 M/UL (ref 4–5.4)
SODIUM SERPL-SCNC: 140 MMOL/L (ref 136–145)
WBC # BLD AUTO: 7.15 K/UL (ref 3.9–12.7)

## 2023-05-29 PROCEDURE — 1111F PR DISCHARGE MEDS RECONCILED W/ CURRENT OUTPATIENT MED LIST: ICD-10-PCS | Mod: CPTII,GC,, | Performed by: INTERNAL MEDICINE

## 2023-05-29 PROCEDURE — 25000003 PHARM REV CODE 250: Performed by: STUDENT IN AN ORGANIZED HEALTH CARE EDUCATION/TRAINING PROGRAM

## 2023-05-29 PROCEDURE — 25000003 PHARM REV CODE 250: Performed by: INTERNAL MEDICINE

## 2023-05-29 PROCEDURE — 97530 THERAPEUTIC ACTIVITIES: CPT

## 2023-05-29 PROCEDURE — 63600175 PHARM REV CODE 636 W HCPCS: Performed by: INTERNAL MEDICINE

## 2023-05-29 PROCEDURE — 97535 SELF CARE MNGMENT TRAINING: CPT

## 2023-05-29 PROCEDURE — 99239 HOSP IP/OBS DSCHRG MGMT >30: CPT | Mod: GC,,, | Performed by: INTERNAL MEDICINE

## 2023-05-29 PROCEDURE — 85025 COMPLETE CBC W/AUTO DIFF WBC: CPT

## 2023-05-29 PROCEDURE — 99239 PR HOSPITAL DISCHARGE DAY,>30 MIN: ICD-10-PCS | Mod: GC,,, | Performed by: INTERNAL MEDICINE

## 2023-05-29 PROCEDURE — 1111F DSCHRG MED/CURRENT MED MERGE: CPT | Mod: CPTII,GC,, | Performed by: INTERNAL MEDICINE

## 2023-05-29 PROCEDURE — 36415 COLL VENOUS BLD VENIPUNCTURE: CPT | Performed by: STUDENT IN AN ORGANIZED HEALTH CARE EDUCATION/TRAINING PROGRAM

## 2023-05-29 PROCEDURE — 25000003 PHARM REV CODE 250

## 2023-05-29 PROCEDURE — 99233 SBSQ HOSP IP/OBS HIGH 50: CPT | Mod: ,,, | Performed by: INTERNAL MEDICINE

## 2023-05-29 PROCEDURE — 90935 HEMODIALYSIS ONE EVALUATION: CPT

## 2023-05-29 PROCEDURE — 80069 RENAL FUNCTION PANEL: CPT | Performed by: STUDENT IN AN ORGANIZED HEALTH CARE EDUCATION/TRAINING PROGRAM

## 2023-05-29 PROCEDURE — 36415 COLL VENOUS BLD VENIPUNCTURE: CPT

## 2023-05-29 PROCEDURE — 99233 PR SUBSEQUENT HOSPITAL CARE,LEVL III: ICD-10-PCS | Mod: ,,, | Performed by: INTERNAL MEDICINE

## 2023-05-29 PROCEDURE — 25000003 PHARM REV CODE 250: Performed by: HOSPITALIST

## 2023-05-29 RX ORDER — CALCIUM ACETATE 667 MG/1
667 CAPSULE ORAL
Qty: 90 CAPSULE | Refills: 11 | Status: SHIPPED | OUTPATIENT
Start: 2023-05-29 | End: 2023-12-26 | Stop reason: SDUPTHER

## 2023-05-29 RX ORDER — HEPARIN SODIUM 1000 [USP'U]/ML
1000 INJECTION, SOLUTION INTRAVENOUS; SUBCUTANEOUS
Status: CANCELLED | OUTPATIENT
Start: 2023-05-29 | End: 2023-05-30

## 2023-05-29 RX ORDER — HYDRALAZINE HYDROCHLORIDE 50 MG/1
50 TABLET, FILM COATED ORAL 3 TIMES DAILY
Qty: 90 TABLET | Refills: 11 | Status: SHIPPED | OUTPATIENT
Start: 2023-05-29 | End: 2023-12-26 | Stop reason: SDUPTHER

## 2023-05-29 RX ORDER — SODIUM CHLORIDE 9 MG/ML
INJECTION, SOLUTION INTRAVENOUS
Status: CANCELLED | OUTPATIENT
Start: 2023-05-29

## 2023-05-29 RX ORDER — SODIUM CHLORIDE 9 MG/ML
INJECTION, SOLUTION INTRAVENOUS ONCE
Status: CANCELLED | OUTPATIENT
Start: 2023-05-29 | End: 2023-05-29

## 2023-05-29 RX ADMIN — ALLOPURINOL 50 MG: 300 TABLET ORAL at 08:05

## 2023-05-29 RX ADMIN — ISOSORBIDE DINITRATE 40 MG: 20 TABLET ORAL at 03:05

## 2023-05-29 RX ADMIN — ATORVASTATIN CALCIUM 80 MG: 40 TABLET, FILM COATED ORAL at 08:05

## 2023-05-29 RX ADMIN — HEPARIN SODIUM 1000 UNITS: 1000 INJECTION, SOLUTION INTRAVENOUS; SUBCUTANEOUS at 11:05

## 2023-05-29 RX ADMIN — HYDRALAZINE HYDROCHLORIDE 50 MG: 50 TABLET ORAL at 08:05

## 2023-05-29 RX ADMIN — CALCITRIOL CAPSULES 0.25 MCG 0.5 MCG: 0.25 CAPSULE ORAL at 08:05

## 2023-05-29 RX ADMIN — FAMOTIDINE 20 MG: 20 TABLET ORAL at 08:05

## 2023-05-29 RX ADMIN — CLOPIDOGREL BISULFATE 75 MG: 75 TABLET ORAL at 08:05

## 2023-05-29 RX ADMIN — MUPIROCIN: 20 OINTMENT TOPICAL at 08:05

## 2023-05-29 RX ADMIN — SODIUM CHLORIDE: 9 INJECTION, SOLUTION INTRAVENOUS at 11:05

## 2023-05-29 RX ADMIN — ISOSORBIDE DINITRATE 40 MG: 20 TABLET ORAL at 08:05

## 2023-05-29 RX ADMIN — HYDRALAZINE HYDROCHLORIDE 50 MG: 50 TABLET ORAL at 03:05

## 2023-05-29 RX ADMIN — CALCIUM ACETATE 1334 MG: 667 CAPSULE ORAL at 08:05

## 2023-05-29 RX ADMIN — APIXABAN 5 MG: 5 TABLET, FILM COATED ORAL at 08:05

## 2023-05-29 RX ADMIN — CARVEDILOL 6.25 MG: 6.25 TABLET, FILM COATED ORAL at 08:05

## 2023-05-29 RX ADMIN — CALCIUM ACETATE 1334 MG: 667 CAPSULE ORAL at 01:05

## 2023-05-29 RX ADMIN — SERTRALINE HYDROCHLORIDE 50 MG: 50 TABLET ORAL at 08:05

## 2023-05-29 NOTE — ASSESSMENT & PLAN NOTE
Now likely with ESRD  - ROSLYN on CKD, likely ischemic, CRS and diuresis  - Baseline serum creatinine in the 2s, up to 3.9 on admission; up to 7.9 on her most recent labs  - Retroperitoneal US with no hydronephrosis    - ECHO with severe systolic dysfunction, EF of 15%    - RHC with PCWP of 30   - patient with increase confusion, showing some mild signs of uremia.   - discussion held with family who are in agreement with starting patient on dialysis  - S/p iHD on 05/25/23.   Plan:  - iHD for volume management and metabolic clearance.   -  consult for HD chair placement.   - renally dose all meds  - Avoid nephrotoxic agents as much as possible    - cont torsemide 20 mg QD

## 2023-05-29 NOTE — DISCHARGE SUMMARY
Andrew Andrade - Cardiology [facility] Medicine  Discharge Summary      Patient Name: Suyapa Connelly  MRN: [id number]  CHAVEZ: [id number]  Patient Class: IP- Inpatient  Admission Date: 5/1/2023  Hospital Length of Stay: 26 days  Discharge Date and Time:  05/29/2023 2:55 PM  Attending Physician: Ruma Schulte MD   Discharging Provider: Clem Mcintyre MD  Primary Care Provider: Magen Christensen MD  [facility] Medicine Team: [facility] Clem Mcintyre MD  Primary Care Team: [facility]    HPI:   57 y.o. female with past medical history of CAD s/p CABG, CKD, DM, HTN, HLD, s/p bilateral AKA who presnets with chest pain, difficulty breathing, leg swelling.     Per ED She has been unable to access any of her medications for the last 3 months. Over the last few months she has noted worsening shortness of breath, particulatly orthopnea, and leg swelling. In the last two weeks she endorses associated abdominal pain and swelling, reduced appetite, and pleuritic chest pain with deep inspiration. She endorses occasional lighthededness with changes in position. Last week she had an episode where she was unable to breathe for about two minutes which was very upsetting for her and her , but she recovered and did not seek care at that time. Today, she complains of significant difficulty breathing, swelling in her legs and abdomen, and reduced appetite.     No headaches, visual changes, URI symptoms, palpitations, nausea, vomiting, diarrhea, blood in her stool, dysuria, hematuria, numbness or weakness in her extremities. She does endorse occasional paresthesias associated with her chronic phantom limb pain. She additionally has had increased difficulties with depression recently, denies suicidal or homicidal ideation, but has had considerable stress and low mood.      Of note, the patient saw a new primary care provider 4/21 for similar concerns who recommended she proceed to the ED at that time, but they were unable  due to financial and geographic concerns. She lives two hours away from [facility] and her  cares for her and elderly mother with dementia. She has had decreased access to medical care over the last year, and unable to access most of her prescriptions. She had a 90 day supply of four medications (lasix, statin, clopidogrel, sertraline) but these were accidentally lost and she has been unable to afford a replacement. They present today for evaluation and to reestablish care and access to her prescriptions.     Labs on admission significant for an elevated BNP, hypocalcemia which corrected is 8.3, significant elevation in Creatinine and a slight elevation of troponin    CXR  Cardiomegaly with pulmonary interstitial edema, suggestive of pulmonary edema secondary to CHF.      Procedure(s) (LRB):  Insertion, Catheter, Central Venous, Hemodialysis (Left)      Hospital Course:   Patient admitted for management of acute on chronic heart failure. Was patient developed significant ROSLYN likely due to cardiorenal syndrome. S/p RHC that showed wedge of 26-32 with CVP of 15. Patient was treated with multiple different diuretics and had minimal urine output. Patient with no improvement in renal function, nephrology believes this is her new baseline. Patient developed uremia and AMS given her renal impairment and elevated BUN. IR placed tunneled cath on 5/25 and patient underwent HD. She had a significant improvement in her mental status after HD and is now close to her baseline. Patient discharged on 5/29. She will have follow ups with cardiology, endocrine, nephrology, PCP, endocrine, and palliative. She will be getting home health. She has a dialysis chair setup and will start outpatient HD on 5/31. Patient to have a hospital bed and jany lift delivered to her home in the near future.     Physical Exam  Constitutional:       General: She is not in acute distress.     Appearance: She is obese. She is not ill-appearing or  toxic-appearing.   HENT:      Head: Normocephalic and atraumatic.   Cardiovascular:      Pulses: Normal pulses.      Heart sounds: Murmur heard.   Pulmonary:      Effort: Pulmonary effort is normal. No respiratory distress.   Abdominal:      General: Abdomen is flat.   Musculoskeletal:      Comments: Bilateral AKA   Neurological:      Mental Status: She is alert.        Goals of Care Treatment Preferences:  Code Status: Full Code      Consults:   Consults (From admission, onward)        Status Ordering Provider     Inpatient consult to Midline team  Once        Provider:  (Not yet assigned)    Completed TERRY LY     Inpatient consult to Midline team  Once        Provider:  (Not yet assigned)    Completed TERRY LY     Inpatient consult to Interventional Radiology  Once        Provider:  (Not yet assigned)    Completed SKYE CRENSHAW     IP consult to Interventional Nephrology  Once        Provider:  (Not yet assigned)    Completed PRISCILA ELI     Inpatient consult to Nephrology  Once        Provider:  (Not yet assigned)    Completed CACHORRO ALEJO     Inpatient consult to Midline team  Once        Provider:  (Not yet assigned)    Completed RU AUGUST     Inpatient consult to Interventional Cardiology  Once        Provider:  (Not yet assigned)    Completed ANDI NAJERA     Inpatient consult to PICC team ([facility])  Once        Provider:  (Not yet assigned)    Completed RU AUGUST     Inpatient consult to Psychology  Once        Provider:  (Not yet assigned)    Completed PRISCILA ELI     Inpatient consult to Cardiology  Once        Provider:  (Not yet assigned)    Completed CACHORRO ALEJO          Cardiac/Vascular  * Acute on chronic combined systolic and diastolic heart failure  Last Echo 6/21 showing     The estimated ejection fraction is 55%.   The left ventricle is normal in size with normal systolic function.   Normal left ventricular diastolic function.   Normal right  ventricular size with normal right ventricular systolic function.   Mild tricuspid regurgitation.   The estimated PA systolic pressure is 31 mmHg.   Normal central venous pressure (3 mmHg).    New Echo   The left ventricle is mildly enlarged with severely decreased systolic function. The estimated ejection fraction is 15%.   There is severe left ventricular global hypokinesis.   Normal right ventricular size with low normal right ventricular systolic function.   Grade I left ventricular diastolic dysfunction.   Biatrial enlargement.   Mild-to-moderate mitral regurgitation.   Severe tricuspid regurgitation.   The estimated PA systolic pressure is 43 mmHg.   Elevated central venous pressure (15 mmHg).           RHC  CVP 15  Wedge 25-32    Patient did not tolerate dobutamine, had chest pain. Dobutamine dc'd  Per cardiology the patients significant peripheral vascular disease prohibits advanced options    - d/c Torsemide    - Plan for HD session # 5/26  - Isosorbide dinitrate   - Coreg 6.25mg BID   - Reduced Hydralazine from 75 to 50 After HD will resume her regular dose as needed  - Outpatient cards follow-up           Renal/  Chronic kidney disease-mineral and bone disorder  - Continue HD outpatient MWF   - Outpatient follow-up with nephrology       Final Active Diagnoses:    Diagnosis Date Noted POA    PRINCIPAL PROBLEM:  Acute on chronic combined systolic and diastolic heart failure [I50.43] 06/21/2021 Yes    Uremic encephalopathy [G93.49, N19] 05/25/2023 Yes    Dependent on hemodialysis [Z99.2] 05/24/2023 Not Applicable    Chronic kidney disease-mineral and bone disorder [N18.9, E83.9, M89.9] 05/24/2023 Yes    Congestive heart failure [I50.9] 05/20/2023 Yes    Myalgia [M79.10] 05/12/2023 No    ROSLYN (acute kidney injury) [N17.9] 05/01/2023 Yes    CKD (chronic kidney disease), stage IV [N18.4] 04/21/2023 Yes     Chronic    Uses self-applied continuous glucose monitoring device [Z97.8] 03/20/2022  "Yes    Dermatitis associated with moisture [L30.8] 02/07/2022 Yes    Above-knee amputation of right lower extremity [S78.111A] 01/29/2022 Yes    Status post above knee amputation, left [Z89.612] 08/20/2021 Not Applicable    Hypoalbuminemia [E88.09] 06/19/2021 Yes    Uremia [N19]  Yes    Impaired functional mobility and endurance [Z74.09] 05/06/2021 Yes    Paroxysmal atrial fibrillation [I48.0] 01/28/2020 Yes    Anemia [D64.9] 01/27/2020 Yes    CAD (coronary artery disease) [I25.10] 01/02/2020 Yes    CAROLIN (generalized anxiety disorder) [F41.1] 05/07/2018 Yes    Essential hypertension [I10] 05/05/2018 Yes    Hypocalcemia [E83.51] 05/05/2018 Yes      Problems Resolved During this Admission:    Diagnosis Date Noted Date Resolved POA    Acute hypoxemic respiratory failure [J96.01] 05/01/2023 05/23/2023 Yes    PAD (peripheral artery disease) [I73.9] 11/04/2015 05/25/2023 Yes       Discharged Condition: stable    Disposition: Home-Health Care [facility] [place]    Follow Up:   Follow-up Information     [facility]. Go on 5/31/2023.    Why: Chair time Monday/Wednesday/Friday 1:30pm.    First chair Wednesday 5/31/23 1:30pm.    *Please arrive at 1:15pm to sign paperwork.*  Contact information:  [address], [id number] [phone]           [facility] [place]. Call in 1 day(s).    Specialty: Home Health Services  Why: They will contact you to schedule date and time to begin home health services.  Call them if you have not heard from them by Tuesday 5/30/23.  Contact information:  [address] [address] [phone]             [facility]. Call in 1 day(s).    Why: Call them regarding delivery of hospital bed and Brandee lift.  Contact information:  [address] [place] [address] [phone]                     Patient Instructions:      PATIENT (BRANDEE) LIFT FOR HOME USE     Order Specific Question Answer Comments   Height: 5' 5" (1.651 m)    Weight: " "215    Does patient have medical equipment at home? wheelchair    Length of need (1-99 months): 99      PATIENT (SILVINO) LIFT FOR HOME USE     Order Specific Question Answer Comments   Height: 5' 5" (1.651 m)    Weight: 97.8kg    Does patient have medical equipment at home? wheelchair    Length of need (1-99 months): 99    Vendor: Other (use comments)    CRM Resolution Date: 5/23/2023      [employer] urine collection system     WALKER FOR HOME USE     Order Specific Question Answer Comments   Type of Walker: Adult (5'4"-6'6")    With wheels? Yes    Height: 5' 5" (1.651 m)    Weight: 97.8kg    Length of need (1-99 months): 99    Does patient have medical equipment at home? wheelchair    Please check all that apply: Patient's condition impairs ambulation.    Please check all that apply: Patient is unable to safely ambulate without equipment.    Vendor: Other (use comments)    CRM Resolution Date: 5/23/2023      HOSPITAL BED FOR HOME USE     Order Specific Question Answer Comments   Type: Semi-electric    Length of need (1-99 months): 99    Does patient have medical equipment at home? wheelchair    Height: 5' 5" (1.651 m)    Weight: 98kg    Please check all that apply: Patient requires positioning of the body in ways not feasible in an ordinary bed due to a medical condition which is expected to last at least one month.    Please check all that apply: Patient requires, for the alleviation of pain, positioning of the body in ways not feasible in an ordinary bed.    Please check all that apply: Patient requires a bed height different than a fixed height hospital bed to permit transfers to chair, wheelchair, or standing.    Vendor: Other (use comments)    CRM Resolution Date: 5/23/2023      Ambulatory referral/consult to Endocrinology   Standing Status: Future   Referral Priority: Routine Referral Type: Consultation   Requested Specialty: Endocrinology   Number of Visits Requested: 1     Ambulatory referral/consult to " "Vascular Surgery   Standing Status: Future   Referral Priority: Routine Referral Type: Consultation   Referral Reason: Specialty Services Required   Requested Specialty: Vascular Surgery   Number of Visits Requested: 1     Ambulatory referral/consult to Internal Medicine   Standing Status: Future   Referral Priority: Routine Referral Type: Consultation   Referral Reason: Specialty Services Required   Requested Specialty: Internal Medicine   Number of Visits Requested: 1     Ambulatory referral/consult to Cardiology   Standing Status: Future   Referral Priority: Routine Referral Type: Consultation   Referral Reason: Specialty Services Required   Requested Specialty: Cardiology   Number of Visits Requested: 1     Ambulatory referral/consult to CLINIC Palliative Care   Standing Status: Future   Referral Priority: Routine Referral Type: Consultation   Requested Specialty: Palliative Medicine   Number of Visits Requested: 1     Ambulatory referral/consult to Nephrology   Standing Status: Future   Referral Priority: Routine Referral Type: Consultation   Referral Reason: Specialty Services Required   Requested Specialty: Nephrology   Number of Visits Requested: 1       Significant Diagnostic Studies: Labs: All labs within the past 24 hours have been reviewed    Pending Diagnostic Studies:     None         Medications:  Reconciled Home Medications:      Medication List      START taking these medications    BD MIGUEL 2ND GEN PEN NEEDLE 32 gauge x 5/32" Ndle  Generic drug: pen needle, diabetic  Use to inject insulin once daily     calcitRIOL 0.5 MCG Cap  Commonly known as: ROCALTROL  Take 1 capsule (0.5 mcg total) by mouth once daily.     calcium acetate(phosphat bind) 667 mg capsule  Commonly known as: PHOSLO  Take 1 capsule (667 mg total) by mouth 3 (three) times daily with meals.     carvediloL 6.25 MG tablet  Commonly known as: COREG  Take 1 tablet (6.25 mg total) by mouth 2 (two) times daily.     ELIQUIS 5 mg Tab  Generic " drug: apixaban  Take 1 tablet (5 mg total) by mouth 2 (two) times daily.     famotidine 20 MG tablet  Commonly known as: PEPCID  Take 1 tablet (20 mg total) by mouth once daily.     hydrALAZINE 50 MG tablet  Commonly known as: APRESOLINE  Take 1 tablet (50 mg total) by mouth 3 (three) times daily.     isosorbide dinitrate 20 MG tablet  Commonly known as: ISORDIL  Take 2 tablets (40 mg total) by mouth 3 (three) times daily.     LEVEMIR FLEXPEN 100 unit/mL (3 mL) Inpn pen  Generic drug: insulin detemir U-100 (Levemir)  Inject 8 Units into the skin every evening.  Replaces: insulin glargine 100 unit/mL injection        CHANGE how you take these medications    allopurinoL 100 MG tablet  Commonly known as: ZYLOPRIM  Take 0.5 tablets (50 mg total) by mouth every other day.  What changed:   · how much to take  · when to take this     atorvastatin 80 MG tablet  Commonly known as: LIPITOR  Take 1 tablet (80 mg total) by mouth once daily.  What changed: See the new instructions.     clopidogreL 75 mg tablet  Commonly known as: PLAVIX  Take 1 tablet (75 mg total) by mouth once daily.  What changed: See the new instructions.     sertraline 50 MG tablet  Commonly known as: ZOLOFT  Take 1 tablet (50 mg total) by mouth once daily.  What changed: See the new instructions.        STOP taking these medications    aspirin 81 MG EC tablet  Commonly known as: ECOTRIN     furosemide 40 MG tablet  Commonly known as: LASIX     insulin glargine 100 unit/mL injection  Commonly known as: Lantus  Replaced by: LEVEMIR FLEXPEN 100 unit/mL (3 mL) Inpn pen     pantoprazole 40 MG tablet  Commonly known as: PROTONIX     sodium bicarbonate 650 MG tablet            Indwelling Lines/Drains at time of discharge:   Lines/Drains/Airways     Central Venous Catheter Line  Duration                Hemodialysis Catheter 05/25/23 1336 left internal jugular 4 days                Time spent on the discharge of patient: 45 minutes         Clem Mcintyre  MD  Department of Hospital Medicine  Andrew Andrade - Cardiology Stepdown

## 2023-05-29 NOTE — PLAN OF CARE
[facility] - Cardiology Stepdown      HOME HEALTH ORDERS  FACE TO FACE ENCOUNTER    Patient Name: Suyapa Connelly  YOB: 1965    PCP: Magen Christensen MD   PCP Address: [address] [place] [address]  PCP Phone Number: [phone]  PCP Fax: [phone]    Encounter Date: 5/1/23    Admit to Home Health    Diagnoses:  Active Hospital Problems    Diagnosis  POA    *Acute on chronic combined systolic and diastolic heart failure [I50.43]  Yes    Uremic encephalopathy [G93.49, N19]  Yes    Dependent on hemodialysis [Z99.2]  Not Applicable    Chronic kidney disease-mineral and bone disorder [N18.9, E83.9, M89.9]  Yes    Congestive heart failure [I50.9]  Yes    Myalgia [M79.10]  No    ROSLYN (acute kidney injury) [N17.9]  Yes    CKD (chronic kidney disease), stage IV [N18.4]  Yes     Chronic    Uses self-applied continuous glucose monitoring device [Z97.8]  Yes    Dermatitis associated with moisture [L30.8]  Yes    Above-knee amputation of right lower extremity [S78.111A]  Yes    Status post above knee amputation, left [Z89.612]  Not Applicable    Hypoalbuminemia [E88.09]  Yes    Uremia [N19]  Yes    Impaired functional mobility and endurance [Z74.09]  Yes    Paroxysmal atrial fibrillation [I48.0]  Yes    Anemia [D64.9]  Yes    CAD (coronary artery disease) [I25.10]  Yes     Formatting of this note might be different from the original.  Last Assessment & Plan:   Formatting of this note might be different from the original.  -- Optimize glucose control.      CAROLIN (generalized anxiety disorder) [F41.1]  Yes    Essential hypertension [I10]  Yes    Hypocalcemia [E83.51]  Yes      Resolved Hospital Problems    Diagnosis Date Resolved POA    Acute hypoxemic respiratory failure [J96.01] 05/23/2023 Yes    PAD (peripheral artery disease) [I73.9] 05/25/2023 Yes       Follow Up Appointments:  No future appointments.    Allergies:  Review of patient's allergies indicates:   Allergen Reactions    Ciprofloxacin  Itching    Contrast media      Kidney injury    Iodine      Kidney injury       Medications: Review discharge medications with patient and family and provide education.    Current Facility-Administered Medications   Medication Dose Route Frequency Provider Last Rate Last Admin    0.9%  NaCl infusion   Intravenous Once Erik Mario MD        acetaminophen tablet 1,000 mg  1,000 mg Oral Q8H PRN Chandrakant Oakley MD   1,000 mg at 05/27/23 0850    allopurinol split tablet 50 mg  50 mg Oral Every other day Batsheva Sleem, DO   50 mg at 05/29/23 0817    apixaban tablet 5 mg  5 mg Oral BID Norman Mackey MD   5 mg at 05/29/23 0817    atorvastatin tablet 80 mg  80 mg Oral Daily Chandrakant Oakley MD   80 mg at 05/29/23 0816    calcitRIOL capsule 0.5 mcg  0.5 mcg Oral Daily Sy Crisostomo MD   0.5 mcg at 05/29/23 0817    calcium acetate(phosphat bind) capsule 1,334 mg  1,334 mg Oral TID WM Batsheva Sleem, DO   1,334 mg at 05/29/23 0816    carvediloL tablet 6.25 mg  6.25 mg Oral BID Chandrakant Oakley MD   6.25 mg at 05/29/23 0816    clopidogreL tablet 75 mg  75 mg Oral Daily Norman Mackey MD   75 mg at 05/29/23 0817    dextrose 10% bolus 125 mL 125 mL  12.5 g Intravenous PRN Chandrakant Oakley MD        dextrose 10% bolus 250 mL 250 mL  25 g Intravenous PRN Chandrakant Oakley MD        dextrose 40 % gel 15,000 mg  15 g Oral PRN Chandrakant Oakley MD        dextrose 40 % gel 30,000 mg  30 g Oral PRN Chandrakant Oakley MD        diclofenac sodium 1 % gel 2 g  2 g Topical (Top) TID PRN Marae Salena, DO   2 g at 05/27/23 2311    famotidine tablet 20 mg  20 mg Oral Daily Batsheva Sleem, DO   20 mg at 05/29/23 0817    glucagon (human recombinant) injection 1 mg  1 mg Intramuscular PRN Chandrakant Oakley MD        heparin (porcine) injection 1,000 Units  1,000 Units Intra-Catheter PRN Jeremy Juan MD   1,000 Units at 05/26/23 1228    hydrALAZINE tablet 50 mg  50 mg Oral TID Norman Mackey MD   50 mg at 05/29/23 0817     HYDROmorphone tablet 2 mg  2 mg Oral BID PRN Chandrakant Oakley MD   2 mg at 05/23/23 1025    insulin aspart U-100 pen 0-5 Units  0-5 Units Subcutaneous QID (AC + HS) PRN Chandrakant Oakley MD   2 Units at 05/13/23 0001    isosorbide dinitrate tablet 40 mg  40 mg Oral TID Anna Lopez MD   40 mg at 05/29/23 0817    LIDOcaine 5 % patch 1 patch  1 patch Transdermal Daily PRN Sunny Martino, DO   1 patch at 05/27/23 2158    melatonin tablet 6 mg  6 mg Oral Nightly PRN Norman Mackey MD   6 mg at 05/28/23 2248    mupirocin 2 % ointment   Nasal BID Jay Qureshi,    Given at 05/29/23 0818    naloxone 0.4 mg/mL injection 0.02 mg  0.02 mg Intravenous PRN Chandrakant Oakley MD        ondansetron disintegrating tablet 8 mg  8 mg Oral Q8H PRN Edison Shipman MD        sertraline tablet 50 mg  50 mg Oral Daily Chandrakant Oakley MD   50 mg at 05/29/23 0817    sodium chloride 0.9% bolus 250 mL 250 mL  250 mL Intravenous PRN Jeremy Juan MD        sodium chloride 0.9% flush 10 mL  10 mL Intravenous PRN Juan Bowman MD         Current Discharge Medication List        START taking these medications    Details   apixaban (ELIQUIS) 5 mg Tab Take 1 tablet (5 mg total) by mouth 2 (two) times daily.  Qty: 60 tablet, Refills: 11      calcitRIOL (ROCALTROL) 0.5 MCG Cap Take 1 capsule (0.5 mcg total) by mouth once daily.  Qty: 30 capsule, Refills: 11      calcium acetate,phosphat bind, (PHOSLO) 667 mg capsule Take 1 capsule (667 mg total) by mouth 3 (three) times daily with meals.  Qty: 90 capsule, Refills: 11      carvediloL (COREG) 6.25 MG tablet Take 1 tablet (6.25 mg total) by mouth 2 (two) times daily.  Qty: 60 tablet, Refills: 11    Comments: .      famotidine (PEPCID) 20 MG tablet Take 1 tablet (20 mg total) by mouth once daily.  Qty: 30 tablet, Refills: 11      hydrALAZINE (APRESOLINE) 50 MG tablet Take 1 tablet (50 mg total) by mouth 3 (three) times daily.  Qty: 90 tablet, Refills: 11    Comments: .      insulin  "detemir U-100, Levemir, 100 unit/mL (3 mL) SubQ InPn pen Inject 8 Units into the skin every evening.  Qty: 3 mL, Refills: 11    Comments: changed QTY per pen size      isosorbide dinitrate (ISORDIL) 20 MG tablet Take 2 tablets (40 mg total) by mouth 3 (three) times daily.  Qty: 180 tablet, Refills: 11    Comments: MD dhillon's change to 20mg tabs via secure chat CCL           CONTINUE these medications which have CHANGED    Details   allopurinoL (ZYLOPRIM) 100 MG tablet Take 0.5 tablets (50 mg total) by mouth every other day.  Qty: 8 tablet, Refills: 11      atorvastatin (LIPITOR) 80 MG tablet Take 1 tablet (80 mg total) by mouth once daily.  Qty: 90 tablet, Refills: 3    Associated Diagnoses: Coronary artery disease, unspecified vessel or lesion type, unspecified whether angina present, unspecified whether native or transplanted heart      clopidogreL (PLAVIX) 75 mg tablet Take 1 tablet (75 mg total) by mouth once daily.  Qty: 90 tablet, Refills: 3    Associated Diagnoses: S/P CABG x 1      sertraline (ZOLOFT) 50 MG tablet Take 1 tablet (50 mg total) by mouth once daily.  Qty: 90 tablet, Refills: 3    Associated Diagnoses: CAROLIN (generalized anxiety disorder)           CONTINUE these medications which have NOT CHANGED    Details   pen needle, diabetic 32 gauge x 5/32" Ndle Use to inject insulin once daily  Qty: 100 each, Refills: 0           STOP taking these medications       furosemide (LASIX) 40 MG tablet Comments:   Reason for Stopping:         pantoprazole (PROTONIX) 40 MG tablet Comments:   Reason for Stopping:         aspirin (ECOTRIN) 81 MG EC tablet Comments:   Reason for Stopping:         insulin glargine (LANTUS) 100 unit/mL injection Comments:   Reason for Stopping:         sodium bicarbonate 650 MG tablet Comments:   Reason for Stopping:                 I have seen and examined this patient within the last 30 days. My clinical findings that support the need for the home health skilled services and home bound " status are the following:no   Weakness/numbness causing balance and gait disturbance due to Weakness/Debility and bilateral leg amputations making it taxing to leave home.  Requiring assistive device to leave home due to unsteady gait caused by  Weakness/Debility and bilateral leg amputations.     Diet:   renal diet    Labs:  NA    Referrals/ Consults  Physical Therapy to evaluate and treat. Evaluate for home safety and equipment needs; Establish/upgrade home exercise program. Perform / instruct on therapeutic exercises, gait training, transfer training, and Range of Motion.  Occupational Therapy to evaluate and treat. Evaluate home environment for safety and equipment needs. Perform/Instruct on transfers, ADL training, ROM, and therapeutic exercises.    Activities:   activity as tolerated    Nursing:   Agency to admit patient within 24 hours of hospital discharge unless specified on physician order or at patient request    SN to complete comprehensive assessment including routine vital signs. Instruct on disease process and s/s of complications to report to MD. Review/verify medication list sent home with the patient at time of discharge  and instruct patient/caregiver as needed. Frequency may be adjusted depending on start of care date.     Skilled nurse to perform up to 3 visits PRN for symptoms related to diagnosis    Notify MD if SBP > 160 or < 90; DBP > 90 or < 50; HR > 120 or < 50; Temp > 101; O2 < 88%    Ok to schedule additional visits based on staff availability and patient request on consecutive days within the home health episode.    When multiple disciplines ordered:    Start of Care occurs on Sunday - Wednesday schedule remaining discipline evaluations as ordered on separate consecutive days following the start of care.    Thursday SOC -schedule subsequent evaluations Friday and Monday the following week.     Friday - Saturday SOC - schedule subsequent discipline evaluations on consecutive days  starting Monday of the following week.    For all post-discharge communication and subsequent orders please contact patient's primary care physician.    Miscellaneous   NA    Home Health Aide:  Physical Therapy Three times weekly and Occupational Therapy Three times weekly    Wound Care Orders  no    I certify that this patient is confined to her home and needs physical therapy and occupational therapy.

## 2023-05-29 NOTE — PROGRESS NOTES
Patient arrived via bed.  Awake, alert and responsive.  VSS.   HD TX started via left IJ HD catheter.

## 2023-05-29 NOTE — SUBJECTIVE & OBJECTIVE
Interval History:     No acute events overnight. Patient scheduled for HD today. On MWF schedule outpatient with chair obtained for this Wednesday. Eager for DC with home health today. No further complaints noted at this time.     Review of patient's allergies indicates:   Allergen Reactions    Ciprofloxacin Itching    Contrast media      Kidney injury    Iodine      Kidney injury     Current Facility-Administered Medications   Medication Frequency    0.9%  NaCl infusion Once    acetaminophen tablet 1,000 mg Q8H PRN    allopurinol split tablet 50 mg Every other day    apixaban tablet 5 mg BID    atorvastatin tablet 80 mg Daily    calcitRIOL capsule 0.5 mcg Daily    calcium acetate(phosphat bind) capsule 1,334 mg TID WM    carvediloL tablet 6.25 mg BID    clopidogreL tablet 75 mg Daily    dextrose 10% bolus 125 mL 125 mL PRN    dextrose 10% bolus 250 mL 250 mL PRN    dextrose 40 % gel 15,000 mg PRN    dextrose 40 % gel 30,000 mg PRN    diclofenac sodium 1 % gel 2 g TID PRN    famotidine tablet 20 mg Daily    glucagon (human recombinant) injection 1 mg PRN    heparin (porcine) injection 1,000 Units PRN    hydrALAZINE tablet 50 mg TID    HYDROmorphone tablet 2 mg BID PRN    insulin aspart U-100 pen 0-5 Units QID (AC + HS) PRN    isosorbide dinitrate tablet 40 mg TID    LIDOcaine 5 % patch 1 patch Daily PRN    melatonin tablet 6 mg Nightly PRN    mupirocin 2 % ointment BID    naloxone 0.4 mg/mL injection 0.02 mg PRN    ondansetron disintegrating tablet 8 mg Q8H PRN    sertraline tablet 50 mg Daily    sodium chloride 0.9% bolus 250 mL 250 mL PRN    sodium chloride 0.9% flush 10 mL PRN       Objective:     Vital Signs (Most Recent):  Temp: 98.2 °F (36.8 °C) (05/29/23 0918)  Pulse: 82 (05/29/23 1140)  Resp: 18 (05/29/23 0918)  BP: 133/62 (05/29/23 1140)  SpO2: 96 % (05/29/23 0918) Vital Signs (24h Range):  Temp:  [98.1 °F (36.7 °C)-98.7 °F (37.1 °C)] 98.2 °F (36.8 °C)  Pulse:  [75-91] 82  Resp:  [16-18] 18  SpO2:  [95 %-98  %] 96 %  BP: (102-156)/(50-68) 133/62     Weight:  (Pt bedscale not working.) (05/27/23 0519)  Body mass index is 36.39 kg/m².  Body surface area is 2.13 meters squared.    I/O last 3 completed shifts:  In: 480 [P.O.:480]  Out: 0      Physical Exam  Constitutional:       General: She is not in acute distress.     Appearance: She is obese. She is not ill-appearing or toxic-appearing.   HENT:      Head: Normocephalic and atraumatic.   Cardiovascular:      Pulses: Normal pulses.      Heart sounds: Murmur heard.   Pulmonary:      Effort: Pulmonary effort is normal. No respiratory distress.   Abdominal:      General: Abdomen is flat.   Musculoskeletal:      Comments: Bilateral AKA   Neurological:      Mental Status: She is alert.        Significant Labs:  All labs within the past 24 hours have been reviewed.     Significant Imaging:  Labs: Reviewed

## 2023-05-29 NOTE — ASSESSMENT & PLAN NOTE
Mineral Bone Disease  Lab Results   Component Value Date    .5 (H) 05/16/2023    CALCIUM 9.3 05/29/2023    CAION 0.92 (L) 05/17/2023    PHOS 4.2 05/29/2023       - Cont calcitriol 0.5mg  - Cont calcium acetate 1334mg  - Stop sevelamer 800mg TID.   - Renal diet with protein intake goal 1.5 g/kg/d with 1 L fluid restriction   - Novasource with meals  - Daily renal panel so that phos and albumin is monitored daily.

## 2023-05-29 NOTE — PROGRESS NOTES
HD TX completed.  Ran for 3.5 hrs.  Net fluid removed is 1.5 liters.  VSS.   Tolerated dialysis well.  Report given to primary nurse.  Returning to floor via bed.   No-Patient/Caregiver offered and refused free interpretation services.

## 2023-05-29 NOTE — PT/OT/SLP PROGRESS
Occupational Therapy   Treatment    Name: Suyapa Connelly  MRN: [id number]  Admitting Diagnosis:  Acute on chronic combined systolic and diastolic heart failure  6 Days Post-Op    Recommendations:     Discharge Recommendations: other (see comments)  Discharge Equipment Recommendations:  drop arm commode, hospital bed, lift device  Barriers to discharge:  None    Assessment:     Suyapa Connelly is a 57 y.o. female with a medical diagnosis of Acute on chronic combined systolic and diastolic heart failure.  She presents with improvements with therapeutic participation, UE activity tolerance, and bed mobility activities. Performance deficits affecting function are weakness, impaired endurance, impaired self care skills, impaired functional mobility, pain, impaired cardiopulmonary response to activity.     Rehab Prognosis:  Good; patient would benefit from acute skilled OT services to address these deficits and reach maximum level of function.       Plan:     Patient to be seen 2 x/week to address the above listed problems via self-care/home management, therapeutic activities, therapeutic exercises, neuromuscular re-education  Plan of Care Expires: 06/07/23  Plan of Care Reviewed with: patient, spouse    Subjective     Chief Complaint: patient reports fear of falling forward when sitting on EOB  Patient/Family Comments/goals: go home  Pain/Comfort:  Pain Rating 1: other (see comments) (not rated)  Location - Side 1: Left  Location 1:  (residual LLE)  Pain Addressed 1: Reposition, Distraction  Pain Rating Post-Intervention 1: other (see comments) (not rated)    Objective:     Communicated with: nursing (Faina) prior to session.  Patient found HOB elevated with telemetry upon OT entry to room. Patient's  in room throughout duration of session.     General Precautions: Standard, fall    Orthopedic Precautions:N/A  Braces: N/A  Respiratory Status: Room air     Occupational Performance:     Bed Mobility:    Patient  completed Scooting/Bridging with contact guard assistance  Patient completed Supine to Sit with modified independence and with side rail  Patient completed Sit to Supine with supervision and body mechanics cueing    Patient sat EOB for ~15 minutes with supervision for static and dynamic activities    Therapeutic activities/ BUE HEP:   2x5 tricep extension push ups in preperation for sliding board transfer and promotion of UE strength necessary for transfers to w/c  Education on theraband activities with yellow theraband administered to patient  Instruction to complete 3x10 daily until HHOT/HHPT gives new HEP  1x5 shoulder flexion (R and L), shoulder horizontal abduction, and scapular retraction    Activities of Daily Living: not completed on this date    [facility] 6 Click ADL: 17    Treatment & Education:  Session today focused on UE strengthening in preparation for sliding board transfers from bed<>w/c. Unable to complete sliding board transfer due to lack of w/c during session. Patient verbalized understanding and agreed to participation in BUE HEP to maintain and improve UE activity tolerance.     Patient educated on:   -purpose of OT and OT POC  -facilitation and education on proper body mechanics, energy conservation, and safety  -importance of early mobility and out of bed activities with staff assist  -overall benefits of therapy       Patient left HOB elevated with all lines intact, call button in reach, nursing notified, and  present    GOALS:   Multidisciplinary Problems       Occupational Therapy Goals       Not on file              Multidisciplinary Problems (Resolved)          Problem: Occupational Therapy    Goal Priority Disciplines Outcome Interventions   Occupational Therapy Goal   (Resolved)     OT, PT/OT Met    Description: Goals to be met by: 5/25     Patient will increase functional independence with ADLs by performing:    UE Dressing with Modified [place].  LE Dressing with Modified  [place].  Sitting at edge of bed x10 minutes with Stand-by Assistance. - Met  Rolling to Bilateral with Stand-by Assistance.   Supine to sit with [place].                         Time Tracking:     OT Date of Treatment: 05/29/23  OT Start Time: 1435  OT Stop Time: 1503  OT Total Time (min): 28 min    Billable Minutes:Therapeutic Activity 28    OT/MACKENZIE: OT          5/29/2023

## 2023-05-29 NOTE — PROGRESS NOTES
[facility] - Cardiology Stepdown  Nephrology  Progress Note    Patient Name: Suyapa Connelly  MRN: [id number]  Admission Date: 5/1/2023  Hospital Length of Stay: 26 days  Attending Provider: Ruma Schulte MD   Primary Care Physician: Magen Christensen MD  Principal Problem:Acute on chronic combined systolic and diastolic heart failure    Subjective:     HPI: 57 y.o. female with past medical history of CAD s/p CABG, CKD, DM, HTN, HLD, s/p bilateral AKA who presnets with chest pain, difficulty breathing, leg swelling. Her most recent ECHO revealed severe systolic dysfunction with EF of 15%. She has been started on diuretics with IV lasix and PO metolazone with adequate urinary response however her renal function has been deteriorating throughout the admission. She reports requiring dialysis at one time before, around the time she underwent her AKAs. Her baseline serum creatinine is in the 2s and it has trended up to 5 during this admission. Nephrology consulted for further evaluation of ROSLYN on CKD.         Interval History:     No acute events overnight. Patient scheduled for HD today. On MWF schedule outpatient with chair obtained for this Wednesday. Eager for DC with home health today. No further complaints noted at this time.     Review of patient's allergies indicates:   Allergen Reactions    Ciprofloxacin Itching    Contrast media      Kidney injury    Iodine      Kidney injury     Current Facility-Administered Medications   Medication Frequency    0.9%  NaCl infusion Once    acetaminophen tablet 1,000 mg Q8H PRN    allopurinol split tablet 50 mg Every other day    apixaban tablet 5 mg BID    atorvastatin tablet 80 mg Daily    calcitRIOL capsule 0.5 mcg Daily    calcium acetate(phosphat bind) capsule 1,334 mg TID WM    carvediloL tablet 6.25 mg BID    clopidogreL tablet 75 mg Daily    dextrose 10% bolus 125 mL 125 mL PRN    dextrose 10% bolus 250 mL 250 mL PRN    dextrose 40 % gel 15,000 mg PRN     dextrose 40 % gel 30,000 mg PRN    diclofenac sodium 1 % gel 2 g TID PRN    famotidine tablet 20 mg Daily    glucagon (human recombinant) injection 1 mg PRN    heparin (porcine) injection 1,000 Units PRN    hydrALAZINE tablet 50 mg TID    HYDROmorphone tablet 2 mg BID PRN    insulin aspart U-100 pen 0-5 Units QID (AC + HS) PRN    isosorbide dinitrate tablet 40 mg TID    LIDOcaine 5 % patch 1 patch Daily PRN    melatonin tablet 6 mg Nightly PRN    mupirocin 2 % ointment BID    naloxone 0.4 mg/mL injection 0.02 mg PRN    ondansetron disintegrating tablet 8 mg Q8H PRN    sertraline tablet 50 mg Daily    sodium chloride 0.9% bolus 250 mL 250 mL PRN    sodium chloride 0.9% flush 10 mL PRN       Objective:     Vital Signs (Most Recent):  Temp: 98.2 °F (36.8 °C) (05/29/23 0918)  Pulse: 82 (05/29/23 1140)  Resp: 18 (05/29/23 0918)  BP: 133/62 (05/29/23 1140)  SpO2: 96 % (05/29/23 0918) Vital Signs (24h Range):  Temp:  [98.1 °F (36.7 °C)-98.7 °F (37.1 °C)] 98.2 °F (36.8 °C)  Pulse:  [75-91] 82  Resp:  [16-18] 18  SpO2:  [95 %-98 %] 96 %  BP: (102-156)/(50-68) 133/62     Weight:  (Pt bedscale not working.) (05/27/23 0519)  Body mass index is 36.39 kg/m².  Body surface area is 2.13 meters squared.    I/O last 3 completed shifts:  In: 480 [P.O.:480]  Out: 0      Physical Exam  Constitutional:       General: She is not in acute distress.     Appearance: She is obese. She is not ill-appearing or toxic-appearing.   HENT:      Head: Normocephalic and atraumatic.   Cardiovascular:      Pulses: Normal pulses.      Heart sounds: Murmur heard.   Pulmonary:      Effort: Pulmonary effort is normal. No respiratory distress.   Abdominal:      General: Abdomen is flat.   Musculoskeletal:      Comments: Bilateral AKA   Neurological:      Mental Status: She is alert.        Significant Labs:  All labs within the past 24 hours have been reviewed.     Significant Imaging:  Labs: Reviewed    Assessment/Plan:      Cardiac/Vascular  * Acute on chronic combined systolic and diastolic heart failure  Management per primary       Renal/  Chronic kidney disease-mineral and bone disorder  Mineral Bone Disease  Lab Results   Component Value Date    .5 (H) 05/16/2023    CALCIUM 9.3 05/29/2023    CAION 0.92 (L) 05/17/2023    PHOS 4.2 05/29/2023       - Cont calcitriol 0.5mg  - Cont calcium acetate 1334mg  - Stop sevelamer 800mg TID.   - Renal diet with protein intake goal 1.5 g/kg/d with 1 L fluid restriction   - Novasource with meals  - Daily renal panel so that phos and albumin is monitored daily.       ROSLYN (acute kidney injury)  Now likely with ESRD  - ROSLYN on CKD, likely ischemic, CRS and diuresis  - Baseline serum creatinine in the 2s, up to 3.9 on admission; up to 7.9 on her most recent labs  - Retroperitoneal US with no hydronephrosis    - ECHO with severe systolic dysfunction, EF of 15%    - RHC with PCWP of 30   - patient with increase confusion, showing some mild signs of uremia.   - discussion held with family who are in agreement with starting patient on dialysis  - S/p iHD on 05/25/23.   Plan:  - iHD for volume management and metabolic clearance.   -  consult for HD chair placement.   - renally dose all meds  - Avoid nephrotoxic agents as much as possible    - cont torsemide 20 mg QD    Oncology  Anemia    Recent Labs   Lab 05/27/23  0503 05/28/23  0619 05/29/23  0512   WBC 5.40 6.27 7.15   HGB 8.2* 9.2* 8.6*   HCT 28.0* 31.4* 29.4*    292 265     Lab Results   Component Value Date    FESATURATED 24 02/04/2022    FERRITIN 1,103 (H) 02/04/2022     - Goal is Hgb of 10-11.   - Obtain iron studies in AM   - plan for EPO with dialysis.             Thank you for your consult. I will follow-up with patient. Please contact us if you have any additional questions.     Case discussed with attending. Attestation to follow.       Jay Qureshi,   Nephrology  [facility] - Cardiology Stepdown

## 2023-05-29 NOTE — NURSING
Patient is ready for discharge. Patient stable alert and oriented. IVs  and TELE removed. Perma cath in place. No complaints of pain. Discussed discharge plan. Reviewed medications and side effects, appointments, and answered questions with patient and family. RX given to patient @ bedside. Pt left via wheelchair with .

## 2023-05-29 NOTE — ASSESSMENT & PLAN NOTE
Last Echo 6/21 showing     The estimated ejection fraction is 55%.   The left ventricle is normal in size with normal systolic function.   Normal left ventricular diastolic function.   Normal right ventricular size with normal right ventricular systolic function.   Mild tricuspid regurgitation.   The estimated PA systolic pressure is 31 mmHg.   Normal central venous pressure (3 mmHg).    New Echo   The left ventricle is mildly enlarged with severely decreased systolic function. The estimated ejection fraction is 15%.   There is severe left ventricular global hypokinesis.   Normal right ventricular size with low normal right ventricular systolic function.   Grade I left ventricular diastolic dysfunction.   Biatrial enlargement.   Mild-to-moderate mitral regurgitation.   Severe tricuspid regurgitation.   The estimated PA systolic pressure is 43 mmHg.   Elevated central venous pressure (15 mmHg).           RHC  CVP 15  Wedge 25-32    Patient did not tolerate dobutamine, had chest pain. Dobutamine dc'd  Per cardiology the patients significant peripheral vascular disease prohibits advanced options    - d/c Torsemide    - Plan for HD session # 5/26  - Isosorbide dinitrate   - Coreg 6.25mg BID   - Reduced Hydralazine from 75 to 50 After HD will resume her regular dose as needed  - Outpatient cards follow-up

## 2023-05-29 NOTE — PLAN OF CARE
Problem: Adult Inpatient Plan of Care  Goal: Plan of Care Review  Outcome: Ongoing, Progressing  Goal: Patient-Specific Goal (Individualized)  Outcome: Ongoing, Progressing  Goal: Absence of Hospital-Acquired Illness or Injury  Outcome: Ongoing, Progressing  Goal: Optimal Comfort and Wellbeing  Outcome: Ongoing, Progressing  Goal: Readiness for Transition of Care  Outcome: Ongoing, Progressing     Problem: Fluid and Electrolyte Imbalance (Acute Kidney Injury/Impairment)  Goal: Fluid and Electrolyte Balance  Outcome: Ongoing, Progressing     Problem: Oral Intake Inadequate (Acute Kidney Injury/Impairment)  Goal: Optimal Nutrition Intake  Outcome: Ongoing, Progressing     Problem: Renal Function Impairment (Acute Kidney Injury/Impairment)  Goal: Effective Renal Function  Outcome: Ongoing, Progressing     Problem: Skin Injury Risk Increased  Goal: Skin Health and Integrity  Outcome: Ongoing, Progressing     Problem: Impaired Wound Healing  Goal: Optimal Wound Healing  Outcome: Ongoing, Progressing     Problem: Fall Injury Risk  Goal: Absence of Fall and Fall-Related Injury  Outcome: Ongoing, Progressing     Problem: Infection  Goal: Absence of Infection Signs and Symptoms  Outcome: Ongoing, Progressing     Problem: Device-Related Complication Risk (Hemodialysis)  Goal: Safe, Effective Therapy Delivery  Outcome: Ongoing, Progressing     Problem: Hemodynamic Instability (Hemodialysis)  Goal: Effective Tissue Perfusion  Outcome: Ongoing, Progressing     Problem: Infection (Hemodialysis)  Goal: Absence of Infection Signs and Symptoms  Outcome: Ongoing, Progressing

## 2023-05-29 NOTE — NURSING
Pt did not have any urine output, pt bladder scanned, only showing 150, informed night nurse. Pt scheduled to receive dialysis on 5/29

## 2023-05-29 NOTE — PROGRESS NOTES
05/29/23 1328   Vital Signs   Temp 98 °F (36.7 °C)   Temp Source Oral   Pulse 84   Heart Rate Source Monitor   Resp 20   SpO2 98 %   Pulse Oximetry Type Intermittent   /68   MAP (mmHg) 92   BP Location Right arm   BP Method Automatic   Patient Position Lying   Assessments (Pre/Post)   Level of Consciousness (AVPU) alert     Pt back from dialysis. Provider @ bedside with pt. Pt ok to go home without receiving hospital bed today.

## 2023-05-29 NOTE — ASSESSMENT & PLAN NOTE
Recent Labs   Lab 05/27/23  0503 05/28/23  0619 05/29/23  0512   WBC 5.40 6.27 7.15   HGB 8.2* 9.2* 8.6*   HCT 28.0* 31.4* 29.4*    292 265     Lab Results   Component Value Date    FESATURATED 24 02/04/2022    FERRITIN 1,103 (H) 02/04/2022     - Goal is Hgb of 10-11.   - Obtain iron studies in AM   - plan for EPO with dialysis.

## 2023-05-29 NOTE — PLAN OF CARE
CH[facility] scheduled Cards apt   Future Appointments   Date Time Provider Department Center   6/1/2023  8:00 AM Armando Jay III, MD [facility]

## 2023-05-29 NOTE — PLAN OF CARE
[facility] - Cardiology Stepdown  Discharge Final Note    Primary Care Provider: Magen Christensen MD    Expected Discharge Date: 5/29/2023    Final Discharge Note (most recent)       Final Note - 05/29/23 1534          Final Note    Assessment Type Final Discharge Note     Anticipated Discharge Disposition Home-Health Care [facility]     Hospital Resources/Appts/Education Provided Post-Acute resouces added to AVS;Dilaysis schedule provided        Post-Acute Status    Post-Acute Authorization Home Health;Dialysis     Home Health Status Set-up Complete/Auth obtained     Diaylsis Status Set-up Complete/Auth obtained                 Per nephrology social worker Renuka pt has been accepted by [facility] M/W/F 1:30pm beginning Wed 5/31/23.  HD schedule added to AVS.  SW attempted to call [employer] regarding delivery of hospital bed and Brandee lift but got message that [employer] is closed today ([facility]).  Information for family to contact [employer] added to AVS.  SW notified Azul with [facility] that pt will be discharging today.    Jenna Bonilla LMSW  [facility]  [id number]      Future Appointments   Date Time Provider Department Center   6/1/2023  8:00 AM Armando Jay III, MD [facility]       Contact Info       [facility]    [address]   [place], [address]    [phone]       Next Steps: Go on 5/31/2023    Instructions: Chair time Monday/Wednesday/Friday 1:30pm.    First chair Wednesday 5/31/23 1:30pm.    *Please arrive at 1:15pm to sign paperwork.*    [facility]   Specialty: Home Health Services    [address] [place] [address] DR JANET ABRAMS  [facility] [address]   Phone: [phone]       Next Steps: Call in 1 day(s)    Instructions: They will contact you to schedule date and time to begin home health services.  Call them if you have not heard from them by Tuesday 5/30/23.    [address] [address]   Phone: [phone]       Next Steps: Call in  1 day(s)    Instructions: Call them regarding delivery of hospital bed and Brandee lift.

## 2023-05-30 ENCOUNTER — PATIENT OUTREACH (OUTPATIENT)
Dept: ADMINISTRATIVE | Facility: CLINIC | Age: 58
End: 2023-05-30
Payer: MEDICARE

## 2023-05-30 ENCOUNTER — PATIENT MESSAGE (OUTPATIENT)
Dept: ADMINISTRATIVE | Facility: CLINIC | Age: 58
End: 2023-05-30
Payer: MEDICARE

## 2023-05-30 PROBLEM — E66.01 SEVERE OBESITY (BMI 35.0-39.9) WITH COMORBIDITY: Status: ACTIVE | Noted: 2023-05-30

## 2023-05-30 PROBLEM — N18.5 CKD (CHRONIC KIDNEY DISEASE), STAGE V: Status: ACTIVE | Noted: 2023-04-21

## 2023-05-30 PROBLEM — S78.111A: Status: RESOLVED | Noted: 2022-01-29 | Resolved: 2023-05-30

## 2023-05-30 PROBLEM — Z89.612 STATUS POST ABOVE KNEE AMPUTATION, LEFT: Status: RESOLVED | Noted: 2021-08-20 | Resolved: 2023-05-30

## 2023-05-30 PROBLEM — Z99.2 ESRD ON DIALYSIS: Status: ACTIVE | Noted: 2023-04-21

## 2023-05-30 PROBLEM — I50.42 CHRONIC COMBINED SYSTOLIC AND DIASTOLIC HEART FAILURE: Status: ACTIVE | Noted: 2021-06-21

## 2023-05-30 PROBLEM — N18.6 ESRD ON DIALYSIS: Status: ACTIVE | Noted: 2023-04-21

## 2023-05-30 NOTE — PROGRESS NOTES
C3 nurse attempted to contact Suyapa KEYSHA Connelly for a TCC post hospital discharge follow up call. The patient is unable to conduct the call @ this time. The patient requested a callback.    The patient does not have a scheduled HOSFU appointment within 5-7 days post hospital discharge date 5/29/23.   Message sent to Physician staff to assist with HOSFU appointment scheduling.    Spoke to  who is taking care of both pt and mother.  requested callback due to caring for family.  asked about HH coming out today. Called [facility] and verified orders and stated that nurse will be out today for assessment.  Advised  that nurse contacting HH and to await their call today

## 2023-05-30 NOTE — PROGRESS NOTES
2nd attempt to make TCC Call. Left voicemail. Please call [phone] leave your first and last name and date of birth for Annette. I will return your call.

## 2023-05-30 NOTE — PLAN OF CARE
SW informed by [facility] that she received a call from pt's  inquiring about pt's dialysis chair.  SW attempted to call pt's  back and left voicemail informing him that pt's dialysis information is located on pt's AVS and that pt is scheduled to start dialysis tomorrow 5/31 at 1:15pm at [facility].  SW left phone number should pt's  have any additional questions.      Jenna Bonilla LMSW  [facility]  [phone]

## 2023-05-31 PROCEDURE — G0180 MD CERTIFICATION HHA PATIENT: HCPCS | Mod: ,,, | Performed by: FAMILY MEDICINE

## 2023-05-31 PROCEDURE — G0180 PR HOME HEALTH MD CERTIFICATION: ICD-10-PCS | Mod: ,,, | Performed by: FAMILY MEDICINE

## 2023-05-31 NOTE — ANESTHESIA PREPROCEDURE EVALUATION
[facility]  Anesthesia Pre-Operative Evaluation         Patient Name: Suyapa Connelly  YOB: 1965  MRN: [id number]    SUBJECTIVE:     Pre-operative evaluation for Procedure(s) (LRB):  CORONARY ARTERY BYPASS GRAFT (CABG) (N/A)     01/06/2020    Suyapa Connelly is a 54 y.o. female w/ a significant PMHx of chronic combined systolic and diastolic HF (EF 35% Grade II DD), HTN, HLD, CAD / MI in 2010, PAD s/p PTA with stents BLE 2015, IDDM (A1c 9.7%), anxiety/depression.  Pt admitted with chest tightness and nausea/vomiting, found to have NSTEMI and started on ACS protocol with heparin gtt.  Pt also noted to have ROSLYN during admission.    Angiogram on12/18/19: mid LAD 90% stenosis, mid OM2 75% stenosis, and Ostial D1 70% stenosis LVDEP 25 mmHg.    Patient now presents for the above procedure(s).      LDA:        Peripheral IV - Single Lumen 01/02/20 1737 20 G Left Forearm (Active)   Site Assessment Clean;Dry;Intact;No redness;No swelling 1/6/2020  8:00 AM   Line Status Flushed;Saline locked 1/5/2020  8:25 PM   Dressing Status Clean;Dry;Intact 1/5/2020  8:25 PM   Dressing Intervention Dressing reinforced 1/5/2020  3:00 AM   Dressing Change Due 01/06/20 1/5/2020  3:00 AM   Site Change Due 01/06/20 1/5/2020  8:25 PM   Reason Not Rotated Not due 1/5/2020  8:25 PM   Number of days: 3            Peripheral IV - Single Lumen 01/02/20 1809 20 G Right Forearm (Active)   Site Assessment Clean;Dry;Intact;No redness;No swelling 1/6/2020  8:00 AM   Line Status Flushed;Saline locked 1/5/2020  8:25 PM   Dressing Status Clean;Dry;Intact 1/5/2020  8:25 PM   Dressing Intervention Dressing reinforced 1/5/2020  3:00 AM   Dressing Change Due 01/06/20 1/5/2020  3:00 AM   Site Change Due 01/06/20 1/5/2020  8:25 PM   Reason Not Rotated Not due 1/5/2020  8:25 PM   Number of days: 3       Prev airway: None documented.    Drips: None documented.      Patient Active Problem List   Diagnosis    Gangrene of toe    ROSLYN  (acute kidney injury)    Peripheral artery disease    Right foot pain    Peripheral vascular disease    PAD (peripheral artery disease)    Hypokalemia, gastrointestinal losses    Acute encephalopathy    Type 2 diabetes mellitus without complication, with long-term current use of insulin    Panic attack as reaction to stress    Malnutrition of moderate degree    Essential hypertension    Vitamin D deficiency    CAROLIN (generalized anxiety disorder)    MDD (major depressive disorder), recurrent episode, moderate    Bereavement    Injury to kidney    Dehydration    Acute cystitis with hematuria    Syncope    Non-intractable vomiting with nausea    Hypertensive urgency    Shortness of breath    Chronic combined systolic and diastolic heart failure    Mixed hyperlipidemia    Dilated cardiomyopathy    Acute coronary syndrome    Acute on chronic combined systolic (congestive) and diastolic (congestive) heart failure    Type 2 diabetes mellitus with hyperglycemia, with long-term current use of insulin    CAD (coronary artery disease)    Contrast dye induced nephropathy    Weakness       Review of patient's allergies indicates:   Allergen Reactions    Pcn [penicillins]      rash       Current Inpatient Medications:      Current Facility-Administered Medications on File Prior to Encounter   Medication Dose Route Frequency Provider Last Rate Last Dose    acetaminophen tablet 650 mg  650 mg Oral Q4H PRN Mary Lou Wolf MD        aspirin chewable tablet 81 mg  81 mg Oral Daily Mary Lou Wolf MD   81 mg at 01/06/20 0929    atorvastatin tablet 80 mg  80 mg Oral Daily Mary Lou Wolf MD   80 mg at 01/06/20 0930    dextrose 10% (D10W) Bolus  12.5 g Intravenous PRN Mary Lou Wolf MD 1,000 mL/hr at 01/03/20 1529 125 mL at 01/03/20 1529    dextrose 10% (D10W) Bolus  25 g Intravenous PRN Mary Lou Wolf MD        dextrose 10% (D10W) Bolus  12.5 g Intravenous PRN Fely Puga NP        dextrose 10% (D10W) Bolus  25 g  Intravenous PRN Fely Puga NP        escitalopram oxalate tablet 10 mg  10 mg Oral Daily Mary Lou Wolf MD   10 mg at 01/06/20 0929    famotidine (PF) injection 20 mg  20 mg Intravenous Daily Jacinto Eugene MD   20 mg at 01/06/20 0930    gabapentin capsule 300 mg  300 mg Oral TID Mary Lou Wolf MD   300 mg at 01/06/20 0929    glucagon (human recombinant) injection 1 mg  1 mg Intramuscular PRN Fely Puga NP        glucose chewable tablet 16 g  16 g Oral PRN Fely Puga NP        glucose chewable tablet 24 g  24 g Oral PRN Fely Puga NP        insulin aspart U-100 pen 0-5 Units  0-5 Units Subcutaneous QID (AC + HS) PRN Fely Puga NP   1 Units at 01/05/20 2209    insulin aspart U-100 pen 3-5 Units  3-5 Units Subcutaneous TIDWM Mary Lou Wolf MD   4 Units at 01/06/20 0930    insulin detemir U-100 pen 20 Units  20 Units Subcutaneous QHS Mary Lou Wolf MD   20 Units at 01/05/20 2101    melatonin tablet 6 mg  6 mg Oral Once Tameka Bolanos MD   Stopped at 01/04/20 2145    nitroGLYCERIN SL tablet 0.4 mg  0.4 mg Sublingual Q5 Min PRN Batsheva Torres DO        promethazine tablet 25 mg  25 mg Oral Q6H PRN Mary Lou Wolf MD   25 mg at 01/04/20 2255    simethicone 40 mg/0.6 mL drops 20 mg  20 mg Oral QID PRN Jacinto Eugene MD        sodium chloride 0.9% flush 10 mL  10 mL Intravenous PRN Mary Lou Wolf MD         Current Outpatient Medications on File Prior to Encounter   Medication Sig Dispense Refill    acetaminophen (TYLENOL) 500 MG tablet Take 1,000 mg by mouth daily as needed for Pain.      aspirin 81 MG Chew Take 1 tablet (81 mg total) by mouth once daily. 90 tablet 3    atorvastatin (LIPITOR) 80 MG tablet Take 1 tablet (80 mg total) by mouth once daily. 90 tablet 3    blood sugar diagnostic (ONETOUCH ULTRA BLUE TEST STRIP) Strp Use to test blood glucose twice daily (Patient taking differently: Tests 3 times daily) 100 each 11    blood-glucose meter Misc Use to test blood glucose 1 each  "0    carvedilol (COREG) 12.5 MG tablet Take 1 tablet (12.5 mg total) by mouth 2 (two) times daily with meals. 180 tablet 3    escitalopram oxalate (LEXAPRO) 10 MG tablet Take 1 tablet (10 mg total) by mouth once daily. 30 tablet 0    famotidine (PEPCID) 20 MG tablet Take 1 tablet (20 mg total) by mouth 2 (two) times daily. 60 tablet 0    gabapentin (NEURONTIN) 300 MG capsule Take 1 capsule (300 mg total) by mouth 3 (three) times daily. 90 capsule 5    hydrOXYzine pamoate (VISTARIL) 50 MG Cap Take 1 capsule (50 mg total) by mouth every 6 (six) hours as needed (anxiety). 120 capsule 0    insulin aspart U-100 (NOVOLOG) 100 unit/mL injection Inject 9 Units into the skin 3 (three) times daily with meals. (Patient taking differently: Inject 8-9 Units into the skin 3 (three) times daily with meals. ) 10 mL 0    insulin glargine 100 units/mL (3mL) SubQ pen Inject 50 Units into the skin every evening. 15 mL 0    lancets 30 gauge Misc use to test blood glucose 2 (two) times daily with meals. 100 each 11    lisinopril (PRINIVIL,ZESTRIL) 40 MG tablet Take 1 tablet (40 mg total) by mouth once daily. 30 tablet 0    multivitamin (THERAGRAN) per tablet Take 1 tablet by mouth once daily.      ondansetron (ZOFRAN-ODT) 8 MG TbDL Dissolve 1 tablet (8 mg total) by mouth every 8 (eight) hours as needed. 30 tablet 1    pen needle, diabetic 31 gauge x 5/16" Ndle Use to inject insulin 2 (two) times daily with meals. 100 each 0    pen needle, diabetic 32 gauge x 5/32" Ndle 1 each by Misc.(Non-Drug; Combo Route) route 2 (two) times daily with meals. 100 each 0    torsemide (DEMADEX) 20 MG Tab Take 2 tablets (40 mg total) by mouth once daily. (Patient taking differently: Take 1 tablet by mouth daily ( as needed if weight gain patient takes 2 tablets daily )) 180 tablet 3    [DISCONTINUED] lancing device Misc 1 Device by Misc.(Non-Drug; Combo Route) route 2 (two) times daily with meals. 1 each 0       Past Surgical History: "   Procedure Laterality Date    Angiogram - Right Extremity Right 7/9/15    angiogram-left leg  10/6/15    CATHETERIZATION OF BOTH LEFT AND RIGHT HEART N/A 12/18/2019    Procedure: CATHETERIZATION, HEART, BOTH LEFT AND RIGHT;  Surgeon: Que Fernando III, MD;  Location: [facility] CATH LAB;  Service: Cardiology;  Laterality: N/A;    CORONARY ANGIOGRAPHY N/A 12/18/2019    Procedure: ANGIOGRAM, CORONARY ARTERY;  Surgeon: Que Fernando III, MD;  Location: [facility] CATH LAB;  Service: Cardiology;  Laterality: N/A;       Social History     Socioeconomic History    Marital status: Legally      Spouse name: Not on file    Number of children: Not on file    Years of education: Not on file    Highest education level: Not on file   Occupational History    Not on file   Social Needs    Financial resource strain: Not on file    Food insecurity:     Worry: Not on file     Inability: Not on file    Transportation needs:     Medical: Not on file     Non-medical: Not on file   Tobacco Use    Smoking status: Never Smoker    Smokeless tobacco: Never Used   Substance and Sexual Activity    Alcohol use: No    Drug use: Yes     Types: Marijuana     Comment: used yesterday    Sexual activity: Not on file   Lifestyle    Physical activity:     Days per week: Not on file     Minutes per session: Not on file    Stress: Not on file   Relationships    Social connections:     Talks on phone: Not on file     Gets together: Not on file     Attends Jewish service: Not on file     Active member of club or organization: Not on file     Attends meetings of clubs or organizations: Not on file     Relationship status: Not on file   Other Topics Concern    Not on file   Social History Narrative    Not on file       OBJECTIVE:     Vital Signs Range (Last 24H):  Temp:  [35.6 °C (96 °F)-36.7 °C (98 °F)]   Pulse:  [73-88]   Resp:  [16-18]   BP: ()/(50-67)   SpO2:  [94 %-98 %]       Significant Labs:  Lab Results    Component Value Date    WBC 7.79 01/14/2020    HGB 10.4 (L) 01/14/2020    HCT 33.0 (L) 01/14/2020     01/14/2020    CHOL 246 (H) 01/03/2020    TRIG 115 01/03/2020    HDL 41 01/03/2020    ALT 21 01/14/2020    AST 20 01/14/2020     01/14/2020    K 4.7 01/14/2020     01/14/2020    CREATININE 1.1 01/14/2020    BUN 26 (H) 01/14/2020    CO2 22 (L) 01/14/2020    TSH 0.562 01/02/2020    INR 1.1 01/02/2020    HGBA1C 9.7 (H) 01/03/2020       Diagnostic Studies: No relevant studies.    EKG:   Results for orders placed or performed during the hospital encounter of 01/02/20   EKG 12-lead    Collection Time: 01/04/20 10:28 AM    Narrative    Test Reason : I24.9,    Vent. Rate : 091 BPM     Atrial Rate : 091 BPM     P-R Int : 132 ms          QRS Dur : 090 ms      QT Int : 378 ms       P-R-T Axes : 048 037 118 degrees     QTc Int : 464 ms    Sinus rhythm with marked sinus arrythmia  Minimal voltage criteria for LVH, may be normal variant  Nonspecific T wave abnormality  Prolonged QT  Abnormal ECG  When compared with ECG of 03-JAN-2020 07:24,  Premature ventricular complexes are no longer Present  Nonspecific T wave abnormality, improved in Inferior leads  Confirmed by ANDREW UGALDE MD ([id number]) on 1/6/2020 1:37:40 AM    Referred By: [email or web address]   SELF           Confirmed By:ANDREW UGALDE MD       2D ECHO:  TTE:  Results for orders placed or performed during the hospital encounter of 01/02/20   Echo Color Flow Doppler? Yes   Result Value Ref Range    Ascending aorta 2.75 cm    STJ 2.48 cm    AV mean gradient 4 mmHg    Ao peak niya 1.18 m/s    Ao VTI 22.70 cm    IVRT 0.14 msec    IVS 0.89 0.6 - 1.1 cm    LA size 4.30 cm    Left Atrium Major Axis 5.46 cm    Left Atrium Minor Axis 5.09 cm    LVIDD 5.71 3.5 - 6.0 cm    LVIDS 4.58 (A) 2.1 - 4.0 cm    LVOT diameter 2.07 cm    LVOT peak VTI 13.33 cm    PW 0.81 0.6 - 1.1 cm    MV Peak A Niya 0.94 m/s    E wave decelartion time 155.17 msec    MV Peak E Niya 0.77 m/s    PV Peak D  Ty 0.47 m/s    PV Peak S Ty 0.39 m/s    RA Major Axis 4.75 cm    RA Width 3.77 cm    RVDD 4.14 cm    Sinus 2.59 cm    TAPSE 1.78 cm    TR Max Ty 3.07 m/s    TDI LATERAL 0.05 m/s    TDI SEPTAL 0.05 m/s    LA WIDTH 5.52 cm    LV Diastolic Volume 160.43 mL    LV Systolic Volume 96.28 mL    RV S' 14.07 cm/s    LVOT peak ty 0.64 m/s    LV LATERAL E/E' RATIO 15.40 m/s    LV SEPTAL E/E' RATIO 15.40 m/s    FS 20 %    LA volume 106.30 cm3    LV mass 184.22 g    Left Ventricle Relative Wall Thickness 0.28 cm    AV valve area 1.98 cm2    AV Velocity Ratio 0.54     AV index (prosthetic) 0.59     E/A ratio 0.82     Mean e' 0.05 m/s    Pulm vein S/D ratio 0.83     LVOT area 3.4 cm2    LVOT stroke volume 44.84 cm3    AV peak gradient 6 mmHg    E/E' ratio 15.40 m/s    LV Systolic Volume Index 49.4 mL/m2    LV Diastolic Volume Index 82.37 mL/m2    LA Volume Index 54.6 mL/m2    LV Mass Index 95 g/m2    Triscuspid Valve Regurgitation Peak Gradient 38 mmHg    BSA 2.01 m2    Right Atrial Pressure (from IVC) 8 mmHg    TV rest pulmonary artery pressure 46 mmHg    LA Volume Index (Mod) 46.50 mL/m2    QEF 34 %    Narrative    · Mild left ventricular enlargement.  · Moderately decreased left ventricular systolic function. The estimated   ejection fraction is 35%.  · Grade II (moderate) left ventricular diastolic dysfunction consistent   with pseudonormalization.  · Septal wall has abnormal motion.  · Local segmental wall motion abnormalities.  · Moderate left atrial enlargement.  · Normal right ventricular systolic function.  · Mild mitral regurgitation.  · Mild tricuspid regurgitation.  · Mild pulmonic regurgitation.  · Intermediate central venous pressure (8 mm Hg).  · The estimated PA systolic pressure is 46 mm Hg  · Very small aneurysm in inferoapex visualized on image 47.  · The quantitatively dervived ejection fraction is 34%.  · Mild aortic regurgitation.  · Pulmonary hypertension present.          KATE:  No results found for this  or any previous visit.    ASSESSMENT/PLAN:     01/06/2020  Suyapa Connelly is a 54 y.o., female.    Anesthesia Evaluation    I have reviewed the Patient Summary Reports.    I have reviewed the Nursing Notes.   I have reviewed the Medications.     Review of Systems  Anesthesia Hx:  No problems with previous Anesthesia  History of prior surgery of interest to airway management or planning: Denies Family Hx of Anesthesia complications.   Denies Personal Hx of Anesthesia complications.   Social:  Non-Smoker, No Alcohol Use    Hematology/Oncology:     Oncology Normal    -- Anemia: Hematology Comments: 10.9 / 35    EENT/Dental:EENT/Dental Normal   Cardiovascular:   Hypertension Past MI CAD   CHF hyperlipidemia    Pulmonary:  Pulmonary Normal  Denies COPD.  Denies Asthma.    Renal/:   Chronic Renal Disease, ARF    Hepatic/GI:   GERD, well controlled    Musculoskeletal:  Musculoskeletal Normal    Neurological:  Neurology Normal  Denies CVA. Denies Seizures.    Endocrine:   Diabetes, poorly controlled, type 2    Dermatological:  Skin Normal    Psych:   anxiety depression          Physical Exam  General:  Well nourished    Airway/Jaw/Neck:  Airway Findings: Mouth Opening: Normal Tongue: Large  General Airway Assessment: Adult  Mallampati: II  Improves to I with phonation.  TM Distance: Normal, at least 6 cm        Eyes/Ears/Nose:  EYES/EARS/NOSE FINDINGS: Normal   Dental:  Dental Findings: In tact         Mental Status:  Mental Status Findings:  Cooperative, Alert and Oriented         Anesthesia Plan  Type of Anesthesia, risks & benefits discussed:  Anesthesia Type:  general  Patient's Preference:   Intra-op Monitoring Plan: arterial line, central line, standard ASA monitors and cardiac output  Intra-op Monitoring Plan Comments:   Post Op Pain Control Plan: multimodal analgesia, IV/PO Opioids PRN and per primary service following discharge from PACU  Post Op Pain Control Plan Comments:   Induction:   IV  Beta Blocker:          Informed Consent: Patient understands risks and agrees with Anesthesia plan.  Questions answered. Anesthesia consent signed with patient.  ASA Score: 4     Day of Surgery Review of History & Physical:    H&P update referred to the surgeon.         Ready For Surgery From Anesthesia Perspective.        no

## 2023-06-12 ENCOUNTER — EXTERNAL HOME HEALTH (OUTPATIENT)
Dept: HOME HEALTH SERVICES | Facility: HOSPITAL | Age: 58
End: 2023-06-12
Payer: MEDICARE

## 2023-06-16 ENCOUNTER — TELEPHONE (OUTPATIENT)
Dept: INTERNAL MEDICINE | Facility: CLINIC | Age: 58
End: 2023-06-16
Payer: MEDICARE

## 2023-06-16 DIAGNOSIS — G47.9 SLEEP DISTURBANCE: Primary | ICD-10-CM

## 2023-06-16 NOTE — TELEPHONE ENCOUNTER
----- Message from Raoul Ontiveros sent at 6/16/2023 11:42 AM CDT -----  Contact: [phone]  Pt  Randell is calling regarding wife bc she just got the hospital. Pt has been having trouble sleeping since may 29. Pt  wants to get a cpap ordered for pt.

## 2023-06-19 ENCOUNTER — TELEPHONE (OUTPATIENT)
Dept: INTERNAL MEDICINE | Facility: CLINIC | Age: 58
End: 2023-06-19
Payer: MEDICARE

## 2023-06-19 NOTE — TELEPHONE ENCOUNTER
Tried to call Randell back to let him know that they have to go through sleep clinic for the Cpap. Dr. Christensen has put in a Sleep clinic order in.

## 2023-06-19 NOTE — TELEPHONE ENCOUNTER
Pts  believes she needs a Cpap machine. Homehealth from [facility] comes every week ever since she has been discharged from the hospital and they are saying the same thing that they believe she needs a Cpap machine. If you could put in an order for one.     When she is laying flat it his hard for her to catch her breathe, when she sits up it is a little better.     He stated [facility] in [place] has them and said that all they would need is a prescription for it..

## 2023-06-19 NOTE — TELEPHONE ENCOUNTER
----- Message from Smita Ontiveros sent at 6/19/2023 11:27 AM CDT -----  Contact: basilio (spouse)[phone]  Pt  requesting a call stated think need c-pap machine for wife.    Please call and advise

## 2023-06-23 ENCOUNTER — TELEPHONE (OUTPATIENT)
Dept: INTERNAL MEDICINE | Facility: CLINIC | Age: 58
End: 2023-06-23
Payer: MEDICARE

## 2023-06-23 NOTE — TELEPHONE ENCOUNTER
----- Message from Smita Ontiveros sent at 6/23/2023 12:02 PM CDT -----  Contact: Randell () [phone]  Pt  requesting a call in regards to c-pap machine stated wife not sleeping at all also stated requested a call a few days ago and still awaiting a response.    Please call and advise

## 2023-06-23 NOTE — TELEPHONE ENCOUNTER
----- Message from Tia Varela, Patient Care Assistant sent at 6/23/2023  1:03 PM CDT -----  Contact: Randell wife  Type: Needs Medical Advice  Who Called:  Randell  Symptoms (please be specific):  burning, urine discoloration, urinary ret ( possible uti)  How long has patient had these symptoms:  5  Pharmacy name and phone #:    [id number] [address] [address]  Phone: [phone] Fax: [phone]  Best Call Back Number: [phone]  Additional Information: thanks

## 2023-06-23 NOTE — TELEPHONE ENCOUNTER
Can you send something in?     burning, urine discoloration brownish ( possible uti), pt is loopy.

## 2023-06-23 NOTE — TELEPHONE ENCOUNTER
Called Mr. Mejias back, I let him know that I tried calling on the 19th, but nobody answered and I was unable to leave a message.     I let him know that he has to go through sleep clinic for the Cpap. They live 2 hours away and would like to try somewhere close to home. I checked sleep locations and they do not have anything close to their house so he is going to look around outside of [facility]. I told him to let us know if he needs an external referral.

## 2023-06-26 ENCOUNTER — TELEPHONE (OUTPATIENT)
Dept: INTERNAL MEDICINE | Facility: CLINIC | Age: 58
End: 2023-06-26
Payer: MEDICARE

## 2023-06-26 NOTE — TELEPHONE ENCOUNTER
Spoke with pt , he states that they don't use the portal however he will bring pt to an Urgent Care today.

## 2023-07-13 ENCOUNTER — EXTERNAL HOME HEALTH (OUTPATIENT)
Dept: HOME HEALTH SERVICES | Facility: HOSPITAL | Age: 58
End: 2023-07-13
Payer: MEDICARE

## 2023-07-19 ENCOUNTER — TELEPHONE (OUTPATIENT)
Dept: ENDOCRINOLOGY | Facility: CLINIC | Age: 58
End: 2023-07-19
Payer: MEDICARE

## 2023-07-19 ENCOUNTER — DOCUMENT SCAN (OUTPATIENT)
Dept: HOME HEALTH SERVICES | Facility: HOSPITAL | Age: 58
End: 2023-07-19
Payer: MEDICARE

## 2023-07-19 NOTE — TELEPHONE ENCOUNTER
----- Message from Katie Moreno MA sent at 7/18/2023  4:37 PM CDT -----  Regarding: FW: appt  Contact: [phone]    ----- Message -----  From: Delores Calderon  Sent: 7/18/2023   4:28 PM CDT  To: Trino Celestin Staff  Subject: appt                                             Pt is calling to schedule an appt with the provider nurse would like to speak and discuss plan of care. Please call No available dates.

## 2023-08-14 PROBLEM — N17.9 AKI (ACUTE KIDNEY INJURY): Status: RESOLVED | Noted: 2023-05-01 | Resolved: 2023-08-14

## 2023-11-20 ENCOUNTER — HOSPITAL ENCOUNTER (EMERGENCY)
Facility: HOSPITAL | Age: 58
Discharge: HOME | End: 2023-11-20
Attending: EMERGENCY MEDICINE
Payer: MEDICARE

## 2023-11-20 VITALS
SYSTOLIC BLOOD PRESSURE: 174 MMHG | WEIGHT: 218.69 LBS | RESPIRATION RATE: 18 BRPM | DIASTOLIC BLOOD PRESSURE: 80 MMHG | OXYGEN SATURATION: 98 % | BODY MASS INDEX: 36.44 KG/M2 | HEIGHT: 65 IN | HEART RATE: 90 BPM | TEMPERATURE: 98 F

## 2023-11-20 DIAGNOSIS — R53.81 MALAISE AND FATIGUE: Primary | ICD-10-CM

## 2023-11-20 DIAGNOSIS — R53.83 MALAISE AND FATIGUE: Primary | ICD-10-CM

## 2023-11-20 LAB
ALBUMIN SERPL BCP-MCNC: 2.8 G/DL (ref 3.5–5.2)
ALP SERPL-CCNC: 354 U/L (ref 55–135)
ALT SERPL W/O P-5'-P-CCNC: 25 U/L (ref 10–44)
ANION GAP SERPL CALC-SCNC: 9 MMOL/L (ref 8–16)
AST SERPL-CCNC: 30 U/L (ref 10–40)
BASOPHILS # BLD AUTO: 0.07 K/UL (ref 0–0.2)
BASOPHILS NFR BLD: 1.4 % (ref 0–1.9)
BILIRUB SERPL-MCNC: 1 MG/DL (ref 0.1–1)
BNP SERPL-MCNC: 1777 PG/ML (ref 0–99)
BUN SERPL-MCNC: 45 MG/DL (ref 6–20)
CALCIUM SERPL-MCNC: 9.3 MG/DL (ref 8.7–10.5)
CHLORIDE SERPL-SCNC: 95 MMOL/L (ref 95–110)
CO2 SERPL-SCNC: 27 MMOL/L (ref 23–29)
CREAT SERPL-MCNC: 4.5 MG/DL (ref 0.5–1.4)
DIFFERENTIAL METHOD: ABNORMAL
EOSINOPHIL # BLD AUTO: 0.2 K/UL (ref 0–0.5)
EOSINOPHIL NFR BLD: 3.9 % (ref 0–8)
ERYTHROCYTE [DISTWIDTH] IN BLOOD BY AUTOMATED COUNT: 17 % (ref 11.5–14.5)
EST. GFR  (NO RACE VARIABLE): 10.8 ML/MIN/1.73 M^2
GLUCOSE SERPL-MCNC: 440 MG/DL (ref 70–110)
HCT VFR BLD AUTO: 34 % (ref 37–48.5)
HGB BLD-MCNC: 10.8 G/DL (ref 12–16)
IMM GRANULOCYTES # BLD AUTO: 0.01 K/UL (ref 0–0.04)
IMM GRANULOCYTES NFR BLD AUTO: 0.2 % (ref 0–0.5)
LYMPHOCYTES # BLD AUTO: 1.1 K/UL (ref 1–4.8)
LYMPHOCYTES NFR BLD: 21.5 % (ref 18–48)
MAGNESIUM SERPL-MCNC: 2 MG/DL (ref 1.6–2.6)
MCH RBC QN AUTO: 29.7 PG (ref 27–31)
MCHC RBC AUTO-ENTMCNC: 31.8 G/DL (ref 32–36)
MCV RBC AUTO: 93 FL (ref 82–98)
MONOCYTES # BLD AUTO: 0.6 K/UL (ref 0.3–1)
MONOCYTES NFR BLD: 11.8 % (ref 4–15)
NEUTROPHILS # BLD AUTO: 3.1 K/UL (ref 1.8–7.7)
NEUTROPHILS NFR BLD: 61.2 % (ref 38–73)
NRBC BLD-RTO: 0 /100 WBC
PHOSPHATE SERPL-MCNC: 3.8 MG/DL (ref 2.7–4.5)
PLATELET # BLD AUTO: 209 K/UL (ref 150–450)
PMV BLD AUTO: 11.7 FL (ref 9.2–12.9)
POTASSIUM SERPL-SCNC: 3.8 MMOL/L (ref 3.5–5.1)
PROT SERPL-MCNC: 8.1 G/DL (ref 6–8.4)
RBC # BLD AUTO: 3.64 M/UL (ref 4–5.4)
SARS-COV-2 RDRP RESP QL NAA+PROBE: NEGATIVE
SODIUM SERPL-SCNC: 131 MMOL/L (ref 136–145)
TROPONIN I SERPL DL<=0.01 NG/ML-MCNC: 0.05 NG/ML (ref 0–0.03)
WBC # BLD AUTO: 5.08 K/UL (ref 3.9–12.7)

## 2023-11-20 PROCEDURE — 80053 COMPREHEN METABOLIC PANEL: CPT | Mod: HCNC | Performed by: EMERGENCY MEDICINE

## 2023-11-20 PROCEDURE — 83735 ASSAY OF MAGNESIUM: CPT | Mod: HCNC | Performed by: EMERGENCY MEDICINE

## 2023-11-20 PROCEDURE — 84484 ASSAY OF TROPONIN QUANT: CPT | Mod: HCNC | Performed by: EMERGENCY MEDICINE

## 2023-11-20 PROCEDURE — 83880 ASSAY OF NATRIURETIC PEPTIDE: CPT | Mod: HCNC | Performed by: EMERGENCY MEDICINE

## 2023-11-20 PROCEDURE — U0002 COVID-19 LAB TEST NON-CDC: HCPCS | Mod: HCNC | Performed by: EMERGENCY MEDICINE

## 2023-11-20 PROCEDURE — 85025 COMPLETE CBC W/AUTO DIFF WBC: CPT | Mod: HCNC | Performed by: EMERGENCY MEDICINE

## 2023-11-20 PROCEDURE — 84100 ASSAY OF PHOSPHORUS: CPT | Mod: HCNC | Performed by: EMERGENCY MEDICINE

## 2023-11-20 PROCEDURE — 99285 EMERGENCY DEPT VISIT HI MDM: CPT | Mod: 25,HCNC

## 2023-11-20 RX ORDER — FUROSEMIDE 10 MG/ML
80 INJECTION INTRAMUSCULAR; INTRAVENOUS
Status: DISCONTINUED | OUTPATIENT
Start: 2023-11-20 | End: 2023-11-20

## 2023-11-20 NOTE — PROVIDER PROGRESS NOTES - EMERGENCY DEPT.
"Encounter Date: 11/20/2023    ED Physician Progress Notes        Physician Note:   Medical screening exam completed.  I have conducted a focused provider triage encounter, findings are as follows:    Brief history of present illness:  Change in mental status over the last 10 day per family member. + SOB. Dialyzed yesterday    ----------------------------------                   11/20/23                             1648            ----------------------------------   BP:            (!) 140/65          Pulse:             96              Resp:              20              Temp:      98.8 °F (37.1 °C)       TempSrc:          Oral             SpO2:             96%              Weight: 99.2 kg (218 lb 11.1 oz)   Height:     5' 5" (1.651 m)       ----------------------------------    Pertinent physical exam:  NAD, sitting in chair, interacting appropriately.     Brief workup plan:  Labs, CXR. CTH.     Preliminary workup initiated; this workup will be continued and followed by the physician or advanced practice provider that is assigned to the patient when roomed.           "

## 2023-11-21 ENCOUNTER — PATIENT OUTREACH (OUTPATIENT)
Dept: EMERGENCY MEDICINE | Facility: HOSPITAL | Age: 58
End: 2023-11-21
Payer: MEDICARE

## 2023-11-21 NOTE — ED TRIAGE NOTES
Patient comes into the emergency department by POV with complaints of Fatigue. Per patient family member, patient states that she has been lethargic and nonresponsive.Pt has been having HA, nausea, vomiting, diarrhea, and SOB. Patient denies dizziness, blurred vision.

## 2023-11-21 NOTE — ED NOTES
Bed: AHALL7  Expected date: 11/20/23  Expected time: 7:08 PM  Means of arrival:   Comments:  Godwin

## 2023-11-21 NOTE — ED NOTES
Bed: 30  Expected date: 11/20/23  Expected time: 10:43 PM  Means of arrival:   Comments:  Katja Hearn

## 2023-11-21 NOTE — ED PROVIDER NOTES
Encounter Date: 11/20/2023       History     Chief Complaint   Patient presents with    Fatigue     States needs to be dialysis      HPI  59 Y/O F with multiple comorbidities including anxiety/depression, diabetes, GERD, osteomyelitis of left foot in the past and on weekly hemodialysis presents with nasal congestion, rhinorrhea, malaise generalized fatigue.  No nausea and vomiting.  No diarrhea.  No fever or chills.  She reports sore throat a few days ago, but it spontaneously resolved and only reports nasal congestion and slight postnasal drip with intermittent productive cough of clear sputum.  No shortness of breath, dyspnea on exertion, chest pain, chest pain on exertion or decreased exercise tolerance.  Although she does receive hemodialysis 3 times a week, she makes urine at baseline and reports hemodialysis yesterday and having an appointment tomorrow.  She denies any black or bloody stool, recent illness or injuries.  Review of patient's allergies indicates:   Allergen Reactions    Ciprofloxacin Itching    Contrast media      Kidney injury    Iodine      Kidney injury     Past Medical History:   Diagnosis Date    Anxiety     Chronic pain syndrome     CKD (chronic kidney disease), stage III     Depression     Diabetes mellitus     type 2    Diabetes mellitus, type 2     GERD (gastroesophageal reflux disease)     Hyperemesis 3/23/2021    Hypokalemia 3/23/2021    Infection of below knee amputation stump 3/12/2022    Osteomyelitis     Osteomyelitis of left foot 4/30/2021    Ulcer of left foot     Vaginal delivery     x1     Past Surgical History:   Procedure Laterality Date    ABOVE-KNEE AMPUTATION Left 5/18/2021    Procedure: AMPUTATION, ABOVE KNEE;  Surgeon: Teddy Huber MD;  Location: [facility] OR [address] [place] [address];  Service: Vascular;  Laterality: Left;    ABOVE-KNEE AMPUTATION Right 3/18/2022    Procedure: AMPUTATION, ABOVE KNEE;  Surgeon: DAYNE Florez II, MD;  Location: [facility] OR [address] [place] [address];  Service: Vascular;   Laterality: Right;    Angiogram - Right Extremity Right 7/9/15    angiogram-left leg  10/6/15    ANGIOGRAPHY OF LOWER EXTREMITY Left 4/29/2021    Procedure: ANGIOGRAM, LOWER EXTREMITY;  Surgeon: Teddy Huber MD;  Location: [address];  Service: Vascular;  Laterality: Left;    BELOW KNEE AMPUTATION OF LOWER EXTREMITY Right 12/28/2021    Procedure: AMPUTATION, BELOW KNEE;  Surgeon: Kaitlyn Rojas MD;  Location: [facility];  Service: General;  Laterality: Right;    CATHETERIZATION OF BOTH LEFT AND RIGHT HEART N/A 12/18/2019    Procedure: CATHETERIZATION, HEART, BOTH LEFT AND RIGHT;  Surgeon: Que Fernando III, MD;  Location: [facility] CATH LAB;  Service: Cardiology;  Laterality: N/A;    CORONARY ANGIOGRAPHY N/A 12/18/2019    Procedure: ANGIOGRAM, CORONARY ARTERY;  Surgeon: Que Fernando III, MD;  Location: [facility] CATH LAB;  Service: Cardiology;  Laterality: N/A;    CORONARY ANGIOGRAPHY INCLUDING BYPASS GRAFTS WITH CATHETERIZATION OF LEFT HEART N/A 7/28/2020    Procedure: ANGIOGRAM, CORONARY, INCLUDING BYPASS GRAFT, WITH LEFT HEART CATHETERIZATION, 9 am;  Surgeon: Rachel Easley MD;  Location: [facility] CATH LAB;  Service: Cardiology;  Laterality: N/A;    CORONARY ARTERY BYPASS GRAFT (CABG) N/A 1/14/2020    Procedure: CORONARY ARTERY BYPASS GRAFT (CABG) x 1     Off Pump;  Surgeon: Huang Altamirano MD;  Location: [address];  Service: Cardiovascular;  Laterality: N/A;    CREATION OF FEMORAL-TIBIAL ARTERY BYPASS Left 4/29/2021    Procedure: CREATION, BYPASS, ARTERIAL, FEMORAL TO ANTERIOR TIBIAL;  Surgeon: Teddy Huber MD;  Location: [address];  Service: Vascular;  Laterality: Left;    CREATION OF FEMOROPOPLITEAL ARTERIAL BYPASS USING GRAFT Left 8/18/2020    Procedure: CREATION, BYPASS, ARTERIAL, FEMORAL TO POPLITEAL, USING GRAFT, LEFT LOWER EXTREMITY;  Surgeon: Teddy Huber MD;  Location: [facility];  Service: Vascular;  Laterality: Left;  REQUEST 7:15 A.M. START----COVID NEGATIVE  ON 8/17  1ST CASE STARTKENYA DUEÑAS ON 8/7/2020 @ 942AM-LO  RN PREOP 8/12/2020   T/S-----CLEARED BY CARDS-------PENDING INSURANCE    DEBRIDEMENT OF FOOT Left 9/8/2020    Procedure: DEBRIDEMENT, FOOT;  Surgeon: Rosio Mayes DPM;  Location: [facility] OR;  Service: Podiatry;  Laterality: Left;  request neoxx .   RN Pre Op 9-4-2020, Covid negative on 9/5/20. C A    DEBRIDEMENT OF FOOT  3/4/2021    Procedure: DEBRIDEMENT, FOOT;  Surgeon: Teddy Huber MD;  Location: [facility] OR;  Service: Vascular;;    DEBRIDEMENT OF FOOT Left 3/9/2021    Procedure: DEBRIDEMENT, FOOT, bone biopsy;  Surgeon: Rosio Mayes DPM;  Location: [facility] OR;  Service: Podiatry;  Laterality: Left;  Request neoxx---COVID IN AM  REQUESTING NOON START  RN Phone Pre op.On Blood thinners Plavix and Eliquis.  Covid am of surgery. C A    DEBRIDEMENT OF FOOT Left 5/4/2021    Procedure: DEBRIDEMENT, FOOT;  Surgeon: Farooq Morley DPM;  Location: [facility] OR 2ND FLR;  Service: Podiatry;  Laterality: Left;    INSERTION OF TUNNELED CENTRAL VENOUS HEMODIALYSIS CATHETER N/A 1/27/2020    Procedure: Insertion, Catheter, Central Venous, Hemodialysis;  Surgeon: ESTEBAN Gomez III, MD;  Location: [facility] CATH LAB;  Service: Peripheral Vascular;  Laterality: N/A;    INSERTION OF TUNNELED CENTRAL VENOUS HEMODIALYSIS CATHETER  5/10/2023    Procedure: Insertion, Catheter, Central Venous, Hemodialysis;  Surgeon: Romulo Queen MD;  Location: [facility] CATH LAB;  Service: Cardiology;;    PERCUTANEOUS TRANSLUMINAL ANGIOPLASTY N/A 3/4/2021    Procedure: PTA (ANGIOPLASTY, PERCUTANEOUS, TRANSLUMINAL);  Surgeon: Teddy Huber MD;  Location: [facility] OR;  Service: Vascular;  Laterality: N/A;    REMOVAL OF ARTERIOVENOUS GRAFT Left 5/27/2021    Procedure: REMOVAL, GRAFT, LEFT LOWER EXTREMITY, WOUND EXPLORATION;  Surgeon: Teddy Huber MD;  Location: [facility] OR 2ND FLR;  Service: Vascular;  Laterality: Left;    REMOVAL OF NAIL OF DIGIT Left 3/9/2021    Procedure: REMOVAL,  NAIL, DIGIT;  Surgeon: Rosio Mayes DPM;  Location: [facility] OR;  Service: Podiatry;  Laterality: Left;    RIGHT HEART CATHETERIZATION Right 5/10/2023    Procedure: INSERTION, CATHETER, RIGHT HEART;  Surgeon: Romulo Queen MD;  Location: [facility] CATH LAB;  Service: Cardiology;  Laterality: Right;    THROMBECTOMY Left 3/4/2021    Procedure: THROMBECTOMY, LEFT LOWER EXTREMITY BYPASS GRAFT, ANGIOGRAM, POSSIBLE INTERVENTION, POSSIBLE LEFT LOWER EXTREMITY BYPASS;  Surgeon: Teddy Huber MD;  Location: [facility] OR;  Service: Vascular;  Laterality: Left;    THROMBECTOMY Left 4/29/2021    Procedure: GRAFT THROMBECTOMY, LEFT LOWER EXTREMITY;  Surgeon: Teddy Huber MD;  Location: [facility] OR [facility] FLR;  Service: Vascular;  Laterality: Left;  14.5 min  1179.85 mGy  341.01 Gycm2  240 ml dye    THROMBECTOMY  10/22/2021    Procedure: THROMBECTOMY;  Surgeon: Saad Arenas MD;  Location: [facility] CATH LAB/EP;  Service: Cardiology;;     Family History   Problem Relation Age of Onset    Diabetes Mother     Diabetes Father     Heart disease Maternal Grandmother     No Known Problems Maternal Grandfather     Diabetes Paternal Grandmother     No Known Problems Paternal Grandfather     Anesthesia problems Neg Hx      Social History     Tobacco Use    Smoking status: Former    Smokeless tobacco: Never   Substance Use Topics    Alcohol use: No    Drug use: Yes     Types: Marijuana     Comment: occassional     Review of Systems    Physical Exam     Initial Vitals [11/20/23 1648]   BP Pulse Resp Temp SpO2   (!) 140/65 96 20 98.8 °F (37.1 °C) 96 %      MAP       --         Physical Exam  CONSTITUTIONAL: Calm. Cooperative. Non-toxic appearance. Well-developed and nourished. Sitting on chair/stretcher in no acute distress.  HEAD: NC/AT.  Right EAR: External ear normal. No TTP of Pinna/Tragus. Hearing and external ear normal. No lacerations. No drainage, swelling or tenderness. No foreign bodies.No mastoid TTPercussion. No middle ear  effusion. No decreased hearing is noted.  Left EAR: External ear normal. No TTP of Pinna/Tragus. Hearing and external ear normal. No lacerations. No drainage, swelling or tenderness. No foreign bodies. No mastoid TTPercussion. No middle ear effusion. No decreased hearing is noted.  NOSE: + B/L Edematous Mucosal Turbinates with clear non-purulent rhinorrhea. No nose lacerations, nasal deformity, septal deviation or nasal septal hematoma. No epistaxis. No foreign bodies. Right sinus exhibits no maxillary or frontal sinus tenderness to percussion. Left sinus exhibits no maxillary or frontal sinus tenderness to percussion.  MOUTH/THROAT: Speaking Full Sentences with no drooling, hoarseness/muffled voice. Oropharynx is clear and moist and mucous membranes are normal. No uvular swelling or deviation. + Mild posterior oropharyngeal erythema, but No oropharyngeal asymmetry, exudates, posterior oropharyngeal edema, or tonsillar/peritonsilar abscesses. No trismus in the jaw. No sublingual elevation, rigidity or TTP.  EYES: Anicteric. PERRL. Conjunctivae normal and EOM are normal. Right eye exhibits  no chemosis, no discharge and no exudate. Left eye exhibits no chemosis, no discharge  and no exudate. No scleral icterus.  NECK: Supple with Trachea normal, normal range of motion, full passive range of motion without pain and phonation normal. No muscular tenderness present. No rigidity. No tracheal deviation and normal range of motion present.  HEART: RRR, normal heart sounds, no murmurs and normal pulses.  PULMONARY/CHEST: No Tachypnea, Effort normal and breath sounds normal. No accessory muscle usage. No respiratory distress. Lungs CTA B/L with No W/R/R.  ABDOMEN: Soft. ND. No TTP  MUSCULOSKELETAL: FROM.  NEURO: AAOx3. Answering Questions Appropriately.  SKIN: Skin is warm, dry and intact. No rash noted.    ED Course   Procedures  Labs Reviewed   B-TYPE NATRIURETIC PEPTIDE - Abnormal; Notable for the following components:        Result Value    BNP 1,777 (*)     All other components within normal limits   CBC W/ AUTO DIFFERENTIAL - Abnormal; Notable for the following components:    RBC 3.64 (*)     Hemoglobin 10.8 (*)     Hematocrit 34.0 (*)     MCHC 31.8 (*)     RDW 17.0 (*)     All other components within normal limits   COMPREHENSIVE METABOLIC PANEL - Abnormal; Notable for the following components:    Sodium 131 (*)     Glucose 440 (*)     BUN 45 (*)     Creatinine 4.5 (*)     Albumin 2.8 (*)     Alkaline Phosphatase 354 (*)     eGFR 10.8 (*)     All other components within normal limits   TROPONIN I - Abnormal; Notable for the following components:    Troponin I 0.048 (*)     All other components within normal limits   MAGNESIUM   PHOSPHORUS   SARS-COV-2 RNA AMPLIFICATION, QUAL    Narrative:     Is the patient symptomatic?->No  Is testing needed for patient travel?->No  Is this needed for pre-procedure or pre-op testing?->No          Imaging Results              CT Head Without Contrast (Final result)  Result time 11/20/23 22:12:16      Final result by Rizwan Valdez DO (11/20/23 22:12:16)                   Impression:      No acute intracranial abnormality.    Stable chronic findings as detailed above.      Electronically signed by: Rizwan Valdez  Date:    11/20/2023  Time:    22:12               Narrative:    EXAMINATION:  CT HEAD WITHOUT CONTRAST    CLINICAL HISTORY:  Mental status change, unknown cause;    TECHNIQUE:  Low dose axial CT images obtained throughout the head without intravenous contrast. Sagittal and coronal reconstructions were performed.    COMPARISON:  CT head dated 08/13/2022.    FINDINGS:  There is motion artifact.  Examination is mildly limited by abnormal patient positioning.  Ventricles and sulci are normal in size for age without evidence of hydrocephalus.  Stable minimal nodularity along the lateral aspect of the left lateral ventricle, suspicious for minimal subependymal gray matter heterotopia.  No  extra-axial blood or fluid collections.  Again seen is a small posterior pericallosal curvilinear lipoma, stable.  There is hypoattenuation of the supratentorial white matter compatible chronic microvascular ischemic changes, stable.  The brain parenchyma is otherwise normal.  No parenchymal mass, hemorrhage, edema or major vascular distribution infarct.  There is a stable small calcified meningioma in the left middle cranial fossa anteriorly.    No calvarial fracture.  There is hyperostosis frontalis interna.  The scalp is unremarkable.  Bilateral paranasal sinuses and mastoid air cells are clear.                                       X-Ray Chest AP Portable (Final result)  Result time 11/20/23 20:25:34      Final result by Rizwan Valdez DO (11/20/23 20:25:34)                   Impression:      No acute abnormality.      Electronically signed by: Rizwan Valdez  Date:    11/20/2023  Time:    20:25               Narrative:    EXAMINATION:  XR CHEST AP PORTABLE    CLINICAL HISTORY:  SOB;    TECHNIQUE:  Single frontal view of the chest was performed.    COMPARISON:  05/17/2023.    FINDINGS:  There is a left internal jugular central venous catheter which terminates in the mid SVC.  There are post CABG changes.  There are median sternotomy wires.  The cardiac silhouette is enlarged.  There are calcifications of the aortic arch.  The lungs are well expanded and clear.  No focal opacities are seen.  The pleural spaces are clear.  The osseous structures are intact.                                       Medications - No data to display  Medical Decision Making  Afebrile, atraumatic and hemodynamically stable female presenting with  secondary to several days of sadness, changes in mentation, cough, nasal congestion, rhinorrhea malaise intermittent nausea and vomiting injured generalized malaise.  Patient is an end-stage renal disease on hemodialysis Monday Wednesday Friday that led to hemodialysis yesterday for 1  hour only.  She denies any current complaints besides sadness as her mother  in the last few weeks.  No airway respiratory instability.  Abdomen benign.  Left chest tunneled cath with no tenderness to palpation or integument cellulitis.  ____________________  Khanh Parker MD, [facility]  Emergency Medicine Staff  7:52 PM 2023                       STAFF PHYSICIAN F/U NOTE:  Suyapa Connelly has been evaluated and treated. She reports much improvement/complete resolution of Sx and is ready to return home.  No need of emergent hemodialysis today.  She has dialysis tomorrow, which I encouraged compliance and making sure she follows up with.  Currently patient reports no new Sx and is tolerating PO challenge. We discussed Sx warranting immediate ED return, which were acknowledged. I recommended F/U and discussion of ED visit with primary care physician.  ____________________  Khanh Parker MD, [facility]  Emergency Medicine Staff              Clinical Impression:  Final diagnoses:  [R53.81, R53.83] Malaise and fatigue (Primary)          ED Disposition Condition    Discharge Stable          ED Prescriptions    None       Follow-up Information       Follow up With Specialties Details Why Contact Info    [facility] - Emergency Dept Emergency Medicine  If symptoms worsen [id number]  [facility] [phone]          Future Appointments   Date Time Provider Department Center   2023 11:20 AM Jayashree Pisano MD [facility] S[facility]          Fabian Parker MD  [phone]

## 2023-11-21 NOTE — ED NOTES
Unable to obtain bloodwork at this time. Pt typically requires US IV, limited to right arm due to dialysis.

## 2023-11-21 NOTE — DISCHARGE INSTRUCTIONS
PLEASE ASSURE YOU FOLLOW-UP WITH YOUR HEMODIALYSIS TOMORROW MORNING WITHOUT FAIL.  RETURN TO THE EMERGENCY DEPARTMENT IF YOU DEVELOP ANY PERSISTENT VOMITING, FEVER GREATER THAN 101° F, PERSISTENT VOMITING, CHEST/BACK/ABDOMINAL PAIN OR ANY NEW CONCERNS.    Imaging Results              CT Head Without Contrast (Final result)  Result time 11/20/23 22:12:16      Final result by Rizwan Valdez DO (11/20/23 22:12:16)                   Impression:      No acute intracranial abnormality.    Stable chronic findings as detailed above.      Electronically signed by: Rizwan Valdez  Date:    11/20/2023  Time:    22:12               Narrative:    EXAMINATION:  CT HEAD WITHOUT CONTRAST    CLINICAL HISTORY:  Mental status change, unknown cause;    TECHNIQUE:  Low dose axial CT images obtained throughout the head without intravenous contrast. Sagittal and coronal reconstructions were performed.    COMPARISON:  CT head dated 08/13/2022.    FINDINGS:  There is motion artifact.  Examination is mildly limited by abnormal patient positioning.  Ventricles and sulci are normal in size for age without evidence of hydrocephalus.  Stable minimal nodularity along the lateral aspect of the left lateral ventricle, suspicious for minimal subependymal gray matter heterotopia.  No extra-axial blood or fluid collections.  Again seen is a small posterior pericallosal curvilinear lipoma, stable.  There is hypoattenuation of the supratentorial white matter compatible chronic microvascular ischemic changes, stable.  The brain parenchyma is otherwise normal.  No parenchymal mass, hemorrhage, edema or major vascular distribution infarct.  There is a stable small calcified meningioma in the left middle cranial fossa anteriorly.    No calvarial fracture.  There is hyperostosis frontalis interna.  The scalp is unremarkable.  Bilateral paranasal sinuses and mastoid air cells are clear.                                       X-Ray Chest AP Portable (Final  result)  Result time 11/20/23 20:25:34      Final result by Rizwan Valdez DO (11/20/23 20:25:34)                   Impression:      No acute abnormality.      Electronically signed by: Rizwan Valdez  Date:    11/20/2023  Time:    20:25               Narrative:    EXAMINATION:  XR CHEST AP PORTABLE    CLINICAL HISTORY:  SOB;    TECHNIQUE:  Single frontal view of the chest was performed.    COMPARISON:  05/17/2023.    FINDINGS:  There is a left internal jugular central venous catheter which terminates in the mid SVC.  There are post CABG changes.  There are median sternotomy wires.  The cardiac silhouette is enlarged.  There are calcifications of the aortic arch.  The lungs are well expanded and clear.  No focal opacities are seen.  The pleural spaces are clear.  The osseous structures are intact.

## 2023-11-22 NOTE — PROGRESS NOTES
Patient was seen in the ED on 11/20/23. Phoned patient on 2 separate occasions to assist with Post ED Discharge Navigation. Patient was unavailable. Message left on voice mail. No further attempts scheduled.  Veda Garcia

## 2023-12-02 ENCOUNTER — HOSPITAL ENCOUNTER (OUTPATIENT)
Facility: HOSPITAL | Age: 58
Discharge: HOME | End: 2023-12-05
Attending: STUDENT IN AN ORGANIZED HEALTH CARE EDUCATION/TRAINING PROGRAM | Admitting: STUDENT IN AN ORGANIZED HEALTH CARE EDUCATION/TRAINING PROGRAM
Payer: MEDICARE

## 2023-12-02 DIAGNOSIS — R07.9 CHEST PAIN: ICD-10-CM

## 2023-12-02 DIAGNOSIS — N19 UREMIA: ICD-10-CM

## 2023-12-02 DIAGNOSIS — E11.65 TYPE 2 DIABETES MELLITUS WITH HYPERGLYCEMIA, WITH LONG-TERM CURRENT USE OF INSULIN: ICD-10-CM

## 2023-12-02 DIAGNOSIS — Z79.4 TYPE 2 DIABETES MELLITUS WITH HYPERGLYCEMIA, WITH LONG-TERM CURRENT USE OF INSULIN: ICD-10-CM

## 2023-12-02 DIAGNOSIS — E16.2 HYPOGLYCEMIA: ICD-10-CM

## 2023-12-02 DIAGNOSIS — T68.XXXA HYPOTHERMIA: ICD-10-CM

## 2023-12-02 DIAGNOSIS — T68.XXXA HYPOTHERMIA, INITIAL ENCOUNTER: Primary | ICD-10-CM

## 2023-12-02 DIAGNOSIS — R00.0 TACHYCARDIA: ICD-10-CM

## 2023-12-02 PROBLEM — M79.89 LEG SWELLING: Status: RESOLVED | Noted: 2022-03-26 | Resolved: 2023-12-02

## 2023-12-02 LAB
ALBUMIN SERPL BCP-MCNC: 2.8 G/DL (ref 3.5–5.2)
ALP SERPL-CCNC: 221 U/L (ref 55–135)
ALT SERPL W/O P-5'-P-CCNC: 11 U/L (ref 10–44)
ANION GAP SERPL CALC-SCNC: 13 MMOL/L (ref 8–16)
AST SERPL-CCNC: 23 U/L (ref 10–40)
BACTERIA #/AREA URNS AUTO: ABNORMAL /HPF
BASOPHILS # BLD AUTO: 0.02 K/UL (ref 0–0.2)
BASOPHILS NFR BLD: 0.4 % (ref 0–1.9)
BILIRUB SERPL-MCNC: 1.6 MG/DL (ref 0.1–1)
BILIRUB UR QL STRIP: NEGATIVE
BUN SERPL-MCNC: 36 MG/DL (ref 6–20)
CALCIUM SERPL-MCNC: 8.1 MG/DL (ref 8.7–10.5)
CHLORIDE SERPL-SCNC: 97 MMOL/L (ref 95–110)
CLARITY UR REFRACT.AUTO: ABNORMAL
CO2 SERPL-SCNC: 25 MMOL/L (ref 23–29)
COLOR UR AUTO: YELLOW
CREAT SERPL-MCNC: 3.8 MG/DL (ref 0.5–1.4)
DIFFERENTIAL METHOD: ABNORMAL
EOSINOPHIL # BLD AUTO: 0 K/UL (ref 0–0.5)
EOSINOPHIL NFR BLD: 0.4 % (ref 0–8)
ERYTHROCYTE [DISTWIDTH] IN BLOOD BY AUTOMATED COUNT: 18.2 % (ref 11.5–14.5)
EST. GFR  (NO RACE VARIABLE): 13.2 ML/MIN/1.73 M^2
ESTIMATED AVG GLUCOSE: 255 MG/DL (ref 68–131)
GLUCOSE SERPL-MCNC: 149 MG/DL (ref 70–110)
GLUCOSE SERPL-MCNC: 183 MG/DL (ref 70–110)
GLUCOSE UR QL STRIP: ABNORMAL
HBA1C MFR BLD: 10.5 % (ref 4–5.6)
HCT VFR BLD AUTO: 37.5 % (ref 37–48.5)
HGB BLD-MCNC: 11.9 G/DL (ref 12–16)
HGB UR QL STRIP: ABNORMAL
HYALINE CASTS UR QL AUTO: 0 /LPF
IMM GRANULOCYTES # BLD AUTO: 0.01 K/UL (ref 0–0.04)
IMM GRANULOCYTES NFR BLD AUTO: 0.2 % (ref 0–0.5)
KETONES UR QL STRIP: NEGATIVE
LACTATE SERPL-SCNC: 0.8 MMOL/L (ref 0.5–2.2)
LEUKOCYTE ESTERASE UR QL STRIP: ABNORMAL
LYMPHOCYTES # BLD AUTO: 0.4 K/UL (ref 1–4.8)
LYMPHOCYTES NFR BLD: 8.5 % (ref 18–48)
MAGNESIUM SERPL-MCNC: 1.8 MG/DL (ref 1.6–2.6)
MCH RBC QN AUTO: 29.3 PG (ref 27–31)
MCHC RBC AUTO-ENTMCNC: 31.7 G/DL (ref 32–36)
MCV RBC AUTO: 92 FL (ref 82–98)
MICROSCOPIC COMMENT: ABNORMAL
MONOCYTES # BLD AUTO: 0.4 K/UL (ref 0.3–1)
MONOCYTES NFR BLD: 7.7 % (ref 4–15)
NEUTROPHILS # BLD AUTO: 4.3 K/UL (ref 1.8–7.7)
NEUTROPHILS NFR BLD: 82.8 % (ref 38–73)
NITRITE UR QL STRIP: NEGATIVE
NRBC BLD-RTO: 0 /100 WBC
PH UR STRIP: 8 [PH] (ref 5–8)
PLATELET # BLD AUTO: 163 K/UL (ref 150–450)
PMV BLD AUTO: 10.3 FL (ref 9.2–12.9)
POCT GLUCOSE: 161 MG/DL (ref 70–110)
POCT GLUCOSE: 183 MG/DL (ref 70–110)
POCT GLUCOSE: 185 MG/DL (ref 70–110)
POCT GLUCOSE: 39 MG/DL (ref 70–110)
POCT GLUCOSE: 47 MG/DL (ref 70–110)
POTASSIUM SERPL-SCNC: 3.9 MMOL/L (ref 3.5–5.1)
PROT SERPL-MCNC: 8 G/DL (ref 6–8.4)
PROT UR QL STRIP: ABNORMAL
RBC # BLD AUTO: 4.06 M/UL (ref 4–5.4)
RBC #/AREA URNS AUTO: 5 /HPF (ref 0–4)
SODIUM SERPL-SCNC: 135 MMOL/L (ref 136–145)
SP GR UR STRIP: 1.02 (ref 1–1.03)
SQUAMOUS #/AREA URNS AUTO: 11 /HPF
TROPONIN I SERPL DL<=0.01 NG/ML-MCNC: 0.04 NG/ML (ref 0–0.03)
URN SPEC COLLECT METH UR: ABNORMAL
WBC # BLD AUTO: 5.17 K/UL (ref 3.9–12.7)
WBC #/AREA URNS AUTO: 22 /HPF (ref 0–5)

## 2023-12-02 PROCEDURE — 86140 C-REACTIVE PROTEIN: CPT | Mod: HCNC | Performed by: STUDENT IN AN ORGANIZED HEALTH CARE EDUCATION/TRAINING PROGRAM

## 2023-12-02 PROCEDURE — 85025 COMPLETE CBC W/AUTO DIFF WBC: CPT | Mod: HCNC | Performed by: STUDENT IN AN ORGANIZED HEALTH CARE EDUCATION/TRAINING PROGRAM

## 2023-12-02 PROCEDURE — 87040 BLOOD CULTURE FOR BACTERIA: CPT | Mod: HCNC | Performed by: STUDENT IN AN ORGANIZED HEALTH CARE EDUCATION/TRAINING PROGRAM

## 2023-12-02 PROCEDURE — 96366 THER/PROPH/DIAG IV INF ADDON: CPT | Mod: HCNC

## 2023-12-02 PROCEDURE — 99285 EMERGENCY DEPT VISIT HI MDM: CPT | Mod: 25,HCNC

## 2023-12-02 PROCEDURE — 83735 ASSAY OF MAGNESIUM: CPT | Mod: HCNC | Performed by: STUDENT IN AN ORGANIZED HEALTH CARE EDUCATION/TRAINING PROGRAM

## 2023-12-02 PROCEDURE — 93005 ELECTROCARDIOGRAM TRACING: CPT | Mod: HCNC

## 2023-12-02 PROCEDURE — 87186 SC STD MICRODIL/AGAR DIL: CPT | Mod: HCNC | Performed by: STUDENT IN AN ORGANIZED HEALTH CARE EDUCATION/TRAINING PROGRAM

## 2023-12-02 PROCEDURE — 85610 PROTHROMBIN TIME: CPT | Mod: HCNC | Performed by: STUDENT IN AN ORGANIZED HEALTH CARE EDUCATION/TRAINING PROGRAM

## 2023-12-02 PROCEDURE — 96375 TX/PRO/DX INJ NEW DRUG ADDON: CPT | Mod: HCNC

## 2023-12-02 PROCEDURE — 84145 PROCALCITONIN (PCT): CPT | Mod: HCNC | Performed by: STUDENT IN AN ORGANIZED HEALTH CARE EDUCATION/TRAINING PROGRAM

## 2023-12-02 PROCEDURE — 87154 CUL TYP ID BLD PTHGN 6+ TRGT: CPT | Mod: HCNC | Performed by: STUDENT IN AN ORGANIZED HEALTH CARE EDUCATION/TRAINING PROGRAM

## 2023-12-02 PROCEDURE — 63600175 PHARM REV CODE 636 W HCPCS: Mod: HCNC | Performed by: STUDENT IN AN ORGANIZED HEALTH CARE EDUCATION/TRAINING PROGRAM

## 2023-12-02 PROCEDURE — G0378 HOSPITAL OBSERVATION PER HR: HCPCS | Mod: HCNC

## 2023-12-02 PROCEDURE — 84484 ASSAY OF TROPONIN QUANT: CPT | Mod: HCNC | Performed by: STUDENT IN AN ORGANIZED HEALTH CARE EDUCATION/TRAINING PROGRAM

## 2023-12-02 PROCEDURE — 84466 ASSAY OF TRANSFERRIN: CPT | Mod: HCNC | Performed by: STUDENT IN AN ORGANIZED HEALTH CARE EDUCATION/TRAINING PROGRAM

## 2023-12-02 PROCEDURE — 94761 N-INVAS EAR/PLS OXIMETRY MLT: CPT | Mod: HCNC

## 2023-12-02 PROCEDURE — 87077 CULTURE AEROBIC IDENTIFY: CPT | Mod: HCNC | Performed by: STUDENT IN AN ORGANIZED HEALTH CARE EDUCATION/TRAINING PROGRAM

## 2023-12-02 PROCEDURE — 93010 EKG 12-LEAD: ICD-10-PCS | Mod: HCNC,,, | Performed by: INTERNAL MEDICINE

## 2023-12-02 PROCEDURE — 96365 THER/PROPH/DIAG IV INF INIT: CPT | Mod: HCNC

## 2023-12-02 PROCEDURE — 83540 ASSAY OF IRON: CPT | Mod: HCNC | Performed by: STUDENT IN AN ORGANIZED HEALTH CARE EDUCATION/TRAINING PROGRAM

## 2023-12-02 PROCEDURE — 81001 URINALYSIS AUTO W/SCOPE: CPT | Mod: HCNC | Performed by: STUDENT IN AN ORGANIZED HEALTH CARE EDUCATION/TRAINING PROGRAM

## 2023-12-02 PROCEDURE — 83036 HEMOGLOBIN GLYCOSYLATED A1C: CPT | Mod: HCNC | Performed by: STUDENT IN AN ORGANIZED HEALTH CARE EDUCATION/TRAINING PROGRAM

## 2023-12-02 PROCEDURE — 25000003 PHARM REV CODE 250: Mod: HCNC | Performed by: STUDENT IN AN ORGANIZED HEALTH CARE EDUCATION/TRAINING PROGRAM

## 2023-12-02 PROCEDURE — 87086 URINE CULTURE/COLONY COUNT: CPT | Mod: HCNC | Performed by: STUDENT IN AN ORGANIZED HEALTH CARE EDUCATION/TRAINING PROGRAM

## 2023-12-02 PROCEDURE — 85652 RBC SED RATE AUTOMATED: CPT | Mod: HCNC | Performed by: STUDENT IN AN ORGANIZED HEALTH CARE EDUCATION/TRAINING PROGRAM

## 2023-12-02 PROCEDURE — 80053 COMPREHEN METABOLIC PANEL: CPT | Mod: HCNC | Performed by: STUDENT IN AN ORGANIZED HEALTH CARE EDUCATION/TRAINING PROGRAM

## 2023-12-02 PROCEDURE — 82962 GLUCOSE BLOOD TEST: CPT | Mod: HCNC

## 2023-12-02 PROCEDURE — 93010 ELECTROCARDIOGRAM REPORT: CPT | Mod: HCNC,,, | Performed by: INTERNAL MEDICINE

## 2023-12-02 PROCEDURE — 82728 ASSAY OF FERRITIN: CPT | Mod: HCNC | Performed by: STUDENT IN AN ORGANIZED HEALTH CARE EDUCATION/TRAINING PROGRAM

## 2023-12-02 PROCEDURE — 87077 CULTURE AEROBIC IDENTIFY: CPT | Mod: 59,HCNC | Performed by: STUDENT IN AN ORGANIZED HEALTH CARE EDUCATION/TRAINING PROGRAM

## 2023-12-02 PROCEDURE — 87088 URINE BACTERIA CULTURE: CPT | Mod: HCNC | Performed by: STUDENT IN AN ORGANIZED HEALTH CARE EDUCATION/TRAINING PROGRAM

## 2023-12-02 PROCEDURE — 83605 ASSAY OF LACTIC ACID: CPT | Mod: HCNC | Performed by: STUDENT IN AN ORGANIZED HEALTH CARE EDUCATION/TRAINING PROGRAM

## 2023-12-02 RX ORDER — IBUPROFEN 200 MG
16 TABLET ORAL
Status: DISCONTINUED | OUTPATIENT
Start: 2023-12-02 | End: 2023-12-05 | Stop reason: HOSPADM

## 2023-12-02 RX ORDER — CARVEDILOL 6.25 MG/1
6.25 TABLET ORAL 2 TIMES DAILY
Status: DISCONTINUED | OUTPATIENT
Start: 2023-12-03 | End: 2023-12-05 | Stop reason: HOSPADM

## 2023-12-02 RX ORDER — NALOXONE HCL 0.4 MG/ML
0.02 VIAL (ML) INJECTION
Status: DISCONTINUED | OUTPATIENT
Start: 2023-12-02 | End: 2023-12-05 | Stop reason: HOSPADM

## 2023-12-02 RX ORDER — TALC
6 POWDER (GRAM) TOPICAL NIGHTLY PRN
Status: DISCONTINUED | OUTPATIENT
Start: 2023-12-02 | End: 2023-12-05 | Stop reason: HOSPADM

## 2023-12-02 RX ORDER — SODIUM CHLORIDE 0.9 % (FLUSH) 0.9 %
10 SYRINGE (ML) INJECTION
Status: DISCONTINUED | OUTPATIENT
Start: 2023-12-02 | End: 2023-12-05 | Stop reason: HOSPADM

## 2023-12-02 RX ORDER — ACETAMINOPHEN 500 MG
1000 TABLET ORAL EVERY 8 HOURS PRN
Status: DISCONTINUED | OUTPATIENT
Start: 2023-12-02 | End: 2023-12-05 | Stop reason: HOSPADM

## 2023-12-02 RX ORDER — MAGNESIUM SULFATE HEPTAHYDRATE 40 MG/ML
2 INJECTION, SOLUTION INTRAVENOUS
Status: COMPLETED | OUTPATIENT
Start: 2023-12-02 | End: 2023-12-02

## 2023-12-02 RX ORDER — SERTRALINE HYDROCHLORIDE 50 MG/1
50 TABLET, FILM COATED ORAL DAILY
Status: DISCONTINUED | OUTPATIENT
Start: 2023-12-03 | End: 2023-12-05 | Stop reason: HOSPADM

## 2023-12-02 RX ORDER — ISOSORBIDE DINITRATE 20 MG/1
40 TABLET ORAL 3 TIMES DAILY
Status: DISCONTINUED | OUTPATIENT
Start: 2023-12-03 | End: 2023-12-05 | Stop reason: HOSPADM

## 2023-12-02 RX ORDER — DILTIAZEM HYDROCHLORIDE 5 MG/ML
10 INJECTION INTRAVENOUS
Status: COMPLETED | OUTPATIENT
Start: 2023-12-02 | End: 2023-12-02

## 2023-12-02 RX ORDER — INSULIN ASPART 100 [IU]/ML
0-5 INJECTION, SOLUTION INTRAVENOUS; SUBCUTANEOUS
Status: DISCONTINUED | OUTPATIENT
Start: 2023-12-02 | End: 2023-12-05 | Stop reason: HOSPADM

## 2023-12-02 RX ORDER — IBUPROFEN 200 MG
24 TABLET ORAL
Status: DISCONTINUED | OUTPATIENT
Start: 2023-12-02 | End: 2023-12-05 | Stop reason: HOSPADM

## 2023-12-02 RX ORDER — CARVEDILOL 3.12 MG/1
6.25 TABLET ORAL
Status: COMPLETED | OUTPATIENT
Start: 2023-12-02 | End: 2023-12-02

## 2023-12-02 RX ORDER — ACETAMINOPHEN 325 MG/1
650 TABLET ORAL EVERY 4 HOURS PRN
Status: DISCONTINUED | OUTPATIENT
Start: 2023-12-02 | End: 2023-12-05 | Stop reason: HOSPADM

## 2023-12-02 RX ORDER — CALCIUM ACETATE 667 MG/1
1334 CAPSULE ORAL
Status: DISCONTINUED | OUTPATIENT
Start: 2023-12-03 | End: 2023-12-05 | Stop reason: HOSPADM

## 2023-12-02 RX ORDER — HYDRALAZINE HYDROCHLORIDE 25 MG/1
50 TABLET, FILM COATED ORAL
Status: COMPLETED | OUTPATIENT
Start: 2023-12-02 | End: 2023-12-02

## 2023-12-02 RX ORDER — HYDRALAZINE HYDROCHLORIDE 50 MG/1
50 TABLET, FILM COATED ORAL 3 TIMES DAILY
Status: DISCONTINUED | OUTPATIENT
Start: 2023-12-03 | End: 2023-12-05 | Stop reason: HOSPADM

## 2023-12-02 RX ORDER — ATORVASTATIN CALCIUM 40 MG/1
80 TABLET, FILM COATED ORAL DAILY
Status: DISCONTINUED | OUTPATIENT
Start: 2023-12-03 | End: 2023-12-05 | Stop reason: HOSPADM

## 2023-12-02 RX ORDER — CALCITRIOL 0.5 UG/1
0.5 CAPSULE ORAL DAILY
Status: DISCONTINUED | OUTPATIENT
Start: 2023-12-03 | End: 2023-12-05 | Stop reason: HOSPADM

## 2023-12-02 RX ORDER — GLUCAGON 1 MG
1 KIT INJECTION
Status: DISCONTINUED | OUTPATIENT
Start: 2023-12-02 | End: 2023-12-05 | Stop reason: HOSPADM

## 2023-12-02 RX ORDER — CLOPIDOGREL BISULFATE 75 MG/1
75 TABLET ORAL DAILY
Status: DISCONTINUED | OUTPATIENT
Start: 2023-12-03 | End: 2023-12-05 | Stop reason: HOSPADM

## 2023-12-02 RX ADMIN — CARVEDILOL 6.25 MG: 3.12 TABLET, FILM COATED ORAL at 07:12

## 2023-12-02 RX ADMIN — HYDRALAZINE HYDROCHLORIDE 50 MG: 25 TABLET, FILM COATED ORAL at 07:12

## 2023-12-02 RX ADMIN — MAGNESIUM SULFATE HEPTAHYDRATE 2 G: 40 INJECTION, SOLUTION INTRAVENOUS at 08:12

## 2023-12-02 RX ADMIN — DILTIAZEM HYDROCHLORIDE 10 MG: 5 INJECTION INTRAVENOUS at 08:12

## 2023-12-02 NOTE — ED NOTES
Pt identifiers Suyapa Connelly were checked and are correct  LOC: The patient is awake, alert, aware of environment with an appropriate affect. Oriented x3 Reoriented to time , speaking appropriately Pt is eating a turkey sandwich  APPEARANCE: Pt resting comfortably, in no acute distress, pt is clean and well groomed, clothing properly fastened  SKIN: Skin warm, dry and intact, normal skin turgor, moist mucus membranes  RESPIRATORY: Airway is open and patent, respirations are spontaneous, even and unlabored, normal effort and rate  Crackles auscultated to lower lung fields  CARDIAC: Normal rate and rhythm, no peripheral edema noted, capillary refill < 3 seconds, bilateral radial pulses 2+  Left upper chest dialysis access site noted   ABDOMEN: Soft, nontender, nondistended. Bowel sounds present to all four quad of abd on auscultation  NEUROLOGIC: PERRL, facial expression is symmetrical, patient moving all extremities spontaneously, normal sensation in all extremities when touched with a finger.  Follows all commands appropriately  MUSCULOSKELETAL: Tyrell AKA

## 2023-12-02 NOTE — ED PROVIDER NOTES
Source of History  patient, family, and EMR    Chief Complaint    Fatigue (Taken off dialysis due to bp high)      History of Present Illness    Suyapa Connelly is a 58 y.o. female presenting with altered mental status.  Found to be hypoglycemic.  She was at the dialysis sent her where she had a chair time of 10:30 a.m., was on the circuit for 2 hours and then suddenly she began to appear fatigued and unresponsive.  They told the  that she had to go to the emergency department so he brought her here.  They did not check her sugar there, but upon arrival here in triage she was hypoglycemic to 30s.  She was given oral glucose, which did not improve much.  She was given IV dextrose and this did improve by the time that I am evaluating the patient.    Her  state that she does not generally eat a whole lot during the day before going to dialysis, but she did have her 5 units of insulin sometime before going to dialysis.  She has not been drinking her usual sugary drinks and he fears that this is the cause of the low blood glucose today.    He denies recent fevers or illnesses, no coughs colds or urinary tract infection recently.  She has no dysuria or urinary frequency, she urinates about 5 times per day.  Her access in the left side of her chest.    Review of Systems    As per HPI and below:  Review of Systems:    Pertinent information obtained as above.  Otherwise limited due to: acuity and change in mental status     Past History    As per HPI and below:  Past Medical History:   Diagnosis Date    Anxiety     Chronic pain syndrome     CKD (chronic kidney disease), stage III     Depression     Diabetes mellitus     type 2    Diabetes mellitus, type 2     GERD (gastroesophageal reflux disease)     Hyperemesis 3/23/2021    Hypokalemia 3/23/2021    Infection of below knee amputation stump 3/12/2022    Osteomyelitis     Osteomyelitis of left foot 4/30/2021    Ulcer of left foot     Vaginal delivery     x1        Past Surgical History:   Procedure Laterality Date    ABOVE-KNEE AMPUTATION Left 5/18/2021    Procedure: AMPUTATION, ABOVE KNEE;  Surgeon: Teddy Huber MD;  Location: [address];  Service: Vascular;  Laterality: Left;    ABOVE-KNEE AMPUTATION Right 3/18/2022    Procedure: AMPUTATION, ABOVE KNEE;  Surgeon: DAYNE Florez II, MD;  Location: [facility] OR [address] [place] [address];  Service: Vascular;  Laterality: Right;    Angiogram - Right Extremity Right 7/9/15    angiogram-left leg  10/6/15    ANGIOGRAPHY OF LOWER EXTREMITY Left 4/29/2021    Procedure: ANGIOGRAM, LOWER EXTREMITY;  Surgeon: Teddy Huber MD;  Location: [facility] OR [address] [place] [address];  Service: Vascular;  Laterality: Left;    BELOW KNEE AMPUTATION OF LOWER EXTREMITY Right 12/28/2021    Procedure: AMPUTATION, BELOW KNEE;  Surgeon: Kaitlyn Rojas MD;  Location: [facility];  Service: General;  Laterality: Right;    CATHETERIZATION OF BOTH LEFT AND RIGHT HEART N/A 12/18/2019    Procedure: CATHETERIZATION, HEART, BOTH LEFT AND RIGHT;  Surgeon: Que Fernando III, MD;  Location: [facility] CATH LAB;  Service: Cardiology;  Laterality: N/A;    CORONARY ANGIOGRAPHY N/A 12/18/2019    Procedure: ANGIOGRAM, CORONARY ARTERY;  Surgeon: Que Fernando III, MD;  Location: [facility] CATH LAB;  Service: Cardiology;  Laterality: N/A;    CORONARY ANGIOGRAPHY INCLUDING BYPASS GRAFTS WITH CATHETERIZATION OF LEFT HEART N/A 7/28/2020    Procedure: ANGIOGRAM, CORONARY, INCLUDING BYPASS GRAFT, WITH LEFT HEART CATHETERIZATION, 9 am;  Surgeon: Rachel Easley MD;  Location: [facility] CATH LAB;  Service: Cardiology;  Laterality: N/A;    CORONARY ARTERY BYPASS GRAFT (CABG) N/A 1/14/2020    Procedure: CORONARY ARTERY BYPASS GRAFT (CABG) x 1     Off Pump;  Surgeon: Huang Altamirano MD;  Location: [address];  Service: Cardiovascular;  Laterality: N/A;    CREATION OF FEMORAL-TIBIAL ARTERY BYPASS Left 4/29/2021    Procedure: CREATION, BYPASS, ARTERIAL, FEMORAL TO ANTERIOR TIBIAL;  Surgeon:  Teddy Huber MD;  [address];  Service: Vascular;  Laterality: Left;    CREATION OF FEMOROPOPLITEAL ARTERIAL BYPASS USING GRAFT Left 8/18/2020    Procedure: CREATION, BYPASS, ARTERIAL, FEMORAL TO POPLITEAL, USING GRAFT, LEFT LOWER EXTREMITY;  Surgeon: Teddy Huber MD;  Location: [facility] OR;  Service: Vascular;  Laterality: Left;  REQUEST 7:15 A.M. START----COVID NEGATIVE ON 8/17  1ST CASE STARTE PER LEANA ON 8/7/2020 @ 942AM-  RN PREOP 8/12/2020   T/S-----CLEARED BY CARDS-------PENDING INSURANCE    DEBRIDEMENT OF FOOT Left 9/8/2020    Procedure: DEBRIDEMENT, FOOT;  Surgeon: Rosio Mayes DPM;  Location: [facility] OR;  Service: Podiatry;  Laterality: Left;  request neoxx .   RN Pre Op 9-4-2020, Covid negative on 9/5/20. C A    DEBRIDEMENT OF FOOT  3/4/2021    Procedure: DEBRIDEMENT, FOOT;  Surgeon: Teddy Huber MD;  Location: [facility] OR;  Service: Vascular;;    DEBRIDEMENT OF FOOT Left 3/9/2021    Procedure: DEBRIDEMENT, FOOT, bone biopsy;  Surgeon: Rosio Mayes DPM;  Location: [facility] OR;  Service: Podiatry;  Laterality: Left;  Request neoxx---COVID IN AM  REQUESTING NOON START  RN Phone Pre op.On Blood thinners Plavix and Eliquis.  Covid am of surgery. C A    DEBRIDEMENT OF FOOT Left 5/4/2021    Procedure: DEBRIDEMENT, FOOT;  Surgeon: Farooq Morley DPM;  [address];  Service: Podiatry;  Laterality: Left;    INSERTION OF TUNNELED CENTRAL VENOUS HEMODIALYSIS CATHETER N/A 1/27/2020    Procedure: Insertion, Catheter, Central Venous, Hemodialysis;  Surgeon: ESTEBAN Gomez III, MD;  Location: [facility] CATH LAB;  Service: Peripheral Vascular;  Laterality: N/A;    INSERTION OF TUNNELED CENTRAL VENOUS HEMODIALYSIS CATHETER  5/10/2023    Procedure: Insertion, Catheter, Central Venous, Hemodialysis;  Surgeon: Romulo Queen MD;  Location: [facility] CATH LAB;  Service: Cardiology;;    PERCUTANEOUS TRANSLUMINAL ANGIOPLASTY N/A 3/4/2021    Procedure: PTA (ANGIOPLASTY,  PERCUTANEOUS, TRANSLUMINAL);  Surgeon: Teddy Hbuer MD;  Location: [facility] OR;  Service: Vascular;  Laterality: N/A;    REMOVAL OF ARTERIOVENOUS GRAFT Left 5/27/2021    Procedure: REMOVAL, GRAFT, LEFT LOWER EXTREMITY, WOUND EXPLORATION;  Surgeon: Teddy Huber MD;  Location: [facility] OR 2ND FLR;  Service: Vascular;  Laterality: Left;    REMOVAL OF NAIL OF DIGIT Left 3/9/2021    Procedure: REMOVAL, NAIL, DIGIT;  Surgeon: Rosio Mayes DPM;  Location: [facility] OR;  Service: Podiatry;  Laterality: Left;    RIGHT HEART CATHETERIZATION Right 5/10/2023    Procedure: INSERTION, CATHETER, RIGHT HEART;  Surgeon: Romulo Queen MD;  Location: [facility] CATH LAB;  Service: Cardiology;  Laterality: Right;    THROMBECTOMY Left 3/4/2021    Procedure: THROMBECTOMY, LEFT LOWER EXTREMITY BYPASS GRAFT, ANGIOGRAM, POSSIBLE INTERVENTION, POSSIBLE LEFT LOWER EXTREMITY BYPASS;  Surgeon: Teddy Huber MD;  Location: 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      Social Determinants of Health     Financial Resource Strain: Low Risk  (12/2/2023)    Overall Financial Resource Strain (CARDIA)     Difficulty of Paying Living Expenses: Not hard at all   Food  Insecurity: No Food Insecurity (12/2/2023)    Hunger Vital Sign     Worried About Running Out of Food in the Last Year: Never true     Ran Out of Food in the Last Year: Never true   Transportation Needs: No Transportation Needs (12/2/2023)    PRAPARE - Transportation     Lack of Transportation (Medical): No     Lack of Transportation (Non-Medical): No   Physical Activity: Inactive (12/2/2023)    Exercise Vital Sign     Days of Exercise per Week: 0 days     Minutes of Exercise per Session: 0 min   Stress: Stress Concern Present (12/2/2023)    [employer] [place] [employer] - Occupational Stress Questionnaire     Feeling of Stress : Rather much   Social Connections: Moderately Isolated (12/2/2023)    Social Connection and Isolation Panel [NHANES]     Frequency of Communication with Friends and Family: More than three times a week     Frequency of Social Gatherings with Friends and Family: Once a week     Attends Confucianism Services: Never     Active Member of Clubs or Organizations: No     Attends Club or Organization Meetings: Never     Marital Status:    Housing Stability: Low Risk  (12/2/2023)    Housing Stability Vital Sign     Unable to Pay for Housing in the Last Year: No     Number of Places Lived in the Last Year: 1     Unstable Housing in the Last Year: No       Family History   Problem Relation Age of Onset    Diabetes Mother     Diabetes Father     Heart disease Maternal Grandmother     No Known Problems Maternal Grandfather     Diabetes Paternal Grandmother     No Known Problems Paternal Grandfather     Anesthesia problems Neg Hx        Review of patient's allergies indicates:   Allergen Reactions    Ciprofloxacin Itching    Contrast media      Kidney injury    Iodine      Kidney injury       No current facility-administered medications on file prior to encounter.     Current Outpatient Medications on File Prior to Encounter   Medication Sig Dispense Refill    allopurinoL (ZYLOPRIM) 100 MG  "tablet Take 0.5 tablets (50 mg total) by mouth every other day. 8 tablet 11    apixaban (ELIQUIS) 5 mg Tab Take 1 tablet (5 mg total) by mouth 2 (two) times daily. 60 tablet 11    atorvastatin (LIPITOR) 80 MG tablet Take 1 tablet (80 mg total) by mouth once daily. 90 tablet 3    calcitRIOL (ROCALTROL) 0.5 MCG Cap Take 1 capsule (0.5 mcg total) by mouth once daily. 30 capsule 11    calcium acetate,phosphat bind, (PHOSLO) 667 mg capsule Take 1 capsule (667 mg total) by mouth 3 (three) times daily with meals. (Patient taking differently: Take 1,334 mg by mouth 3 (three) times daily with meals.) 90 capsule 11    carvediloL (COREG) 6.25 MG tablet Take 1 tablet (6.25 mg total) by mouth 2 (two) times daily. 60 tablet 11    clopidogreL (PLAVIX) 75 mg tablet Take 1 tablet (75 mg total) by mouth once daily. 90 tablet 3    hydrALAZINE (APRESOLINE) 50 MG tablet Take 1 tablet (50 mg total) by mouth 3 (three) times daily. 90 tablet 11    insulin detemir U-100, Levemir, 100 unit/mL (3 mL) SubQ InPn pen Inject 8 Units into the skin every evening. 3 mL 11    isosorbide dinitrate (ISORDIL) 20 MG tablet Take 2 tablets (40 mg total) by mouth 3 (three) times daily. 180 tablet 11    pen needle, diabetic 32 gauge x 5/32" Ndle Use to inject insulin once daily 100 each 0    sertraline (ZOLOFT) 50 MG tablet Take 1 tablet (50 mg total) by mouth once daily. 90 tablet 3    [DISCONTINUED] famotidine (PEPCID) 20 MG tablet Take 1 tablet (20 mg total) by mouth once daily. 30 tablet 11    [DISCONTINUED] lancing device Misc 1 Device by Misc.(Non-Drug; Combo Route) route 2 (two) times daily with meals. 1 each 0       Physical Exam    Reviewed nursing notes.  Vitals:    12/02/23 2058 12/02/23 2102 12/02/23 2131 12/02/23 2240   BP: (!) 143/68 127/60 (!) 114/56 117/63   BP Location:       Patient Position:       Pulse: (!) 128 86 73 87   Resp:   18 18   Temp:    98.4 °F (36.9 °C)   TempSrc:    Oral   SpO2:   100% 100%   Weight:       Height:     "     General:  Initially poor responsiveness but then became more responsive as she was getting dextrose infusion  Skin:  Warm, dry, intact.  No rash.  Head:  Normocephalic, atraumatic.    Neck:  Supple.   HEENT:  Pupils are equal and round, appropriate for room, extraocular movements are intact.  Normal phonation.  Dry mucous membranes.  Cardiovascular:  Regular rate and rhythm  Respiratory:  Lungs are clear to auscultation, respirations are non-labored, breath sounds are equal.    Gastrointestinal:  Soft, Nontender, Non distended.  Small midline ventral hernia that is easily reducible at the most superior portion of her abdomen.  Back:  Nontender.   Musculoskeletal:  Normal range of motion observed of the arms, she was status post above-knee amputation of both lower extremities in his wheelchair-bound otherwise  Neurological:  Alert and oriented to person, place, time, and situation following dextrose infusion without any focal deficits  Psychiatric:  Cooperative, appropriate mood & affect.       Initial MDM and Data Review    58 y.o. female presenting for evaluation of altered mental status found to be related to hypoglycemia.  No recent trauma or other infectious type of symptoms.  Improved after being given dextrose.    Differential includes:  Hyperglycemia, infection, electrolyte abnormality, poor nutritional intake    Work-up includes:  CBC, CMP, UA  No indication for imaging at this time    Interventions include:  Dextrose infusion    The patient has significant medical comorbidities that influence decision making for this acute process, such as:  Diabetes, end-stage renal disease dependent on hemodialysis    I decided to obtain the patient's medical records and review relevant documentation from hospital records and clinic records.  Pertinent information is noted.  Patient on insulin Levemir 8 units every evening and 5 units of short-acting insulin with meals   No recent changes    Medications   sodium  chloride 0.9% flush 10 mL (has no administration in time range)   melatonin tablet 6 mg (has no administration in time range)   allopurinol split tablet 50 mg (has no administration in time range)   apixaban tablet 5 mg (has no administration in time range)   atorvastatin tablet 80 mg (has no administration in time range)   calcitRIOL capsule 0.5 mcg (has no administration in time range)   calcium acetate(phosphat bind) capsule 1,334 mg (has no administration in time range)   carvediloL tablet 6.25 mg (has no administration in time range)   clopidogreL tablet 75 mg (has no administration in time range)   hydrALAZINE tablet 50 mg (has no administration in time range)   insulin detemir U-100 (Levemir) pen 8 Units (has no administration in time range)   isosorbide dinitrate tablet 40 mg (has no administration in time range)   sertraline tablet 50 mg (has no administration in time range)   sodium chloride 0.9% flush 10 mL (has no administration in time range)   acetaminophen tablet 650 mg (has no administration in time range)   acetaminophen tablet 1,000 mg (has no administration in time range)   naloxone 0.4 mg/mL injection 0.02 mg (has no administration in time range)   glucose chewable tablet 16 g (has no administration in time range)   glucose chewable tablet 24 g (has no administration in time range)   glucagon (human recombinant) injection 1 mg (has no administration in time range)   insulin aspart U-100 pen 0-5 Units (has no administration in time range)   dextrose 10% bolus 125 mL 125 mL (has no administration in time range)   dextrose 10% bolus 250 mL 250 mL (has no administration in time range)   hydrALAZINE tablet 50 mg (50 mg Oral Given 12/2/23 1911)   carvediloL tablet 6.25 mg (6.25 mg Oral Given 12/2/23 1911)   magnesium sulfate 2g in water 50mL IVPB (premix) (0 g Intravenous Stopped 12/2/23 2302)   diltiaZEM injection 10 mg (10 mg Intravenous Given 12/2/23 2059)       Results and ED Course    Labs  Reviewed   CBC W/ AUTO DIFFERENTIAL - Abnormal; Notable for the following components:       Result Value    Hemoglobin 11.9 (*)     MCHC 31.7 (*)     RDW 18.2 (*)     Lymph # 0.4 (*)     Gran % 82.8 (*)     Lymph % 8.5 (*)     All other components within normal limits    Narrative:     add omn mg per  /order#[id number] @ 17:43  12/02/2023    COMPREHENSIVE METABOLIC PANEL - Abnormal; Notable for the following components:    Sodium 135 (*)     Glucose 149 (*)     BUN 36 (*)     Creatinine 3.8 (*)     Calcium 8.1 (*)     Albumin 2.8 (*)     Total Bilirubin 1.6 (*)     Alkaline Phosphatase 221 (*)     eGFR 13.2 (*)     All other components within normal limits    Narrative:     add omn mg per  /order#[id number] @ 17:43  12/02/2023    URINALYSIS, REFLEX TO URINE CULTURE - Abnormal; Notable for the following components:    Appearance, UA Hazy (*)     Protein, UA 3+ (*)     Glucose, UA Trace (*)     Occult Blood UA Trace (*)     Leukocytes, UA 3+ (*)     All other components within normal limits    Narrative:     Specimen Source->Urine   TROPONIN I - Abnormal; Notable for the following components:    Troponin I 0.037 (*)     All other components within normal limits   HEMOGLOBIN A1C - Abnormal; Notable for the following components:    Hemoglobin A1C 10.5 (*)     Estimated Avg Glucose 255 (*)     All other components within normal limits   URINALYSIS MICROSCOPIC - Abnormal; Notable for the following components:    RBC, UA 5 (*)     WBC, UA 22 (*)     Bacteria Few (*)     All other components within normal limits    Narrative:     Specimen Source->Urine   POCT GLUCOSE - Abnormal; Notable for the following components:    POCT Glucose 39 (*)     All other components within normal limits   POCT GLUCOSE - Abnormal; Notable for the following components:    POCT Glucose 47 (*)     All other components within normal limits   POCT GLUCOSE - Abnormal; Notable for the following components:    POCT Glucose 161 (*)      All other components within normal limits   POCT GLUCOSE MONITORING CONTINUOUS - Abnormal; Notable for the following components:    POC Glucose 183 (*)     All other components within normal limits   POCT GLUCOSE - Abnormal; Notable for the following components:    POCT Glucose 183 (*)     All other components within normal limits   POCT GLUCOSE - Abnormal; Notable for the following components:    POCT Glucose 185 (*)     All other components within normal limits   CULTURE, BLOOD   CULTURE, BLOOD   CULTURE, URINE   MAGNESIUM   MAGNESIUM    Narrative:     add omn mg per  /order#[id number] @ 17:43  12/02/2023    LACTIC ACID, PLASMA   IRON AND TIBC   FERRITIN   PROCALCITONIN   SEDIMENTATION RATE   C-REACTIVE PROTEIN   PROTIME-INR   POCT GLUCOSE MONITORING CONTINUOUS   POCT GLUCOSE MONITORING CONTINUOUS       Imaging Results              X-Ray Chest 1 View (Final result)  Result time 12/02/23 21:28:01      Final result by Jovan Persaud MD (12/02/23 21:28:01)                   Impression:      As above.      Electronically signed by: Jovan Persaud MD  Date:    12/02/2023  Time:    21:28               Narrative:    EXAMINATION:  XR CHEST 1 VIEW    CLINICAL HISTORY:  Tachycardia, unspecified    TECHNIQUE:  Single frontal view of the chest was performed.    COMPARISON:  11/20/2023.    FINDINGS:  Left IJ catheter and sternotomy wires appear unchanged.    Cardiomegaly with vascular congestion and edema, similar to prior.  Subsegmental atelectatic change left lung base behind the heart.  Linear scarring right base laterally, similar to prior.  No large effusion or pneumothorax identified.    Heart and lungs otherwise appear unchanged when allowing for differences in technique and positioning.                                      ED Course as of 12/02/23 2336   Sat Dec 02, 2023   1648 POCT Glucose(!!): 47 [AC]   1746 Patient found to be hypothermic 93.  Will place on Daniel Hugger and warm up the patient.  Will  recheck glucose. [AC]   1802 POCT Glucose(!): 161  improving [AC]   1806 Patient is feeling much improved.  She is conversive and laughing. [AC]   1807 Sodium(!): 135 [AC]   1807 Potassium: 3.9 [AC]   1807 Glucose(!): 149 [AC]   1807 Creatinine(!): 3.8  ESRD [AC]   1855 WBC: 5.17 [AC]   1855 Hemoglobin(!): 11.9 [AC]   1857 Will give her her evening blood pressure medications [AC]   1919 Temp(!): 94.5 °F (34.7 °C)  Temperature improving [AC]   1949 POC Glucose(!): 183 [AC]   1949 Pulse(!): 127  In afib, I ordered her home meds [AC]   2041 Patient is still tachycardic.  Reviewed EKG.  I wonder if it is a 2-1 atrial flutter?Glucose has remained stable.  Still hypothermic. [AC]   2108 Heart rate now 85.  I gave her diltiazem. [AC]      ED Course User Index  [AC] Buzz Zamarripa,            EKG interpreted by myself    EKG  Performed: 1734  Rate/Rhythm/Axis: 83 bpm, sinus rhythm, nml axis  QRS 88 ms  Qtc 528 ms  Impression:  Normal Sinus Rhythm.  EKG without evidence of Na channel blockade, prolonged QTC is present however, has been present on multiple prior EKGs.  No other significant change.  T-wave inversions in 1 and aVL are present and were present previously.      Impression and Plan    58 y.o. female with findings of hypoglycemia now resolved based on the work up in the emergency department as above.    Important lab/imaging findings include: as above    Pt remained hypothermic and had tachycardia with ?2:1 flutter with hx of afib, and required observation. Stable blood sugar in ED.    All tests, treatment options and disposition options were discussed with the patient.  The decision was made to admit the patient to the hospital.    The patient was admitted in stable condition and all further questions/concerns by patient and/or family were addressed.    The patient will follow up with their primary care physician and specialist as discussed in the next several days or return if any further concerns or change  in symptoms necessitating re-evaluation.    Critical Care:  Date: 12/02/2023  Performed by: Dr. Buzz Zamarripa  Authorized by: Dr. Buzz Zamarripa   Total critical care time (exclusive of procedural time) : 45 minutes  Upon my evaluation, this patient had a high probability of imminent or life-threatening deterioration due to [ hypoglycemia ] which required my direct attention, intervention and personal management.  Critical care was necessary to treat or prevent imminent or life-threatening deterioration.  This may include review of laboratory data, radiology results, discussion with consultants and family, and monitoring for potential decompensations. Interventions were performed as documented.                   Final diagnoses:  [E16.2] Hypoglycemia  [T68.XXXA] Hypothermia, initial encounter (Primary)  [R00.0] Tachycardia        ED Disposition Condition    Observation Stable                  Buzz Zamarripa,   12/02/23 6945

## 2023-12-02 NOTE — ED TRIAGE NOTES
Pt was a dialysis today in [place], the nurse notided pt was lethargic, stopped the dialysis and pt's  brought pt to [facility]   Pt's CBG on arrival was 39

## 2023-12-03 LAB
ALBUMIN SERPL BCP-MCNC: 2.4 G/DL (ref 3.5–5.2)
ALP SERPL-CCNC: 192 U/L (ref 55–135)
ALT SERPL W/O P-5'-P-CCNC: 9 U/L (ref 10–44)
ANION GAP SERPL CALC-SCNC: 15 MMOL/L (ref 8–16)
ANION GAP SERPL CALC-SCNC: 15 MMOL/L (ref 8–16)
AST SERPL-CCNC: 19 U/L (ref 10–40)
BASOPHILS # BLD AUTO: 0.04 K/UL (ref 0–0.2)
BASOPHILS NFR BLD: 0.8 % (ref 0–1.9)
BILIRUB SERPL-MCNC: 1.1 MG/DL (ref 0.1–1)
BNP SERPL-MCNC: 1505 PG/ML (ref 0–99)
BUN SERPL-MCNC: 43 MG/DL (ref 6–20)
BUN SERPL-MCNC: 46 MG/DL (ref 6–20)
CALCIUM SERPL-MCNC: 7.7 MG/DL (ref 8.7–10.5)
CALCIUM SERPL-MCNC: 7.8 MG/DL (ref 8.7–10.5)
CHLORIDE SERPL-SCNC: 97 MMOL/L (ref 95–110)
CHLORIDE SERPL-SCNC: 97 MMOL/L (ref 95–110)
CO2 SERPL-SCNC: 24 MMOL/L (ref 23–29)
CO2 SERPL-SCNC: 24 MMOL/L (ref 23–29)
CREAT SERPL-MCNC: 4.2 MG/DL (ref 0.5–1.4)
CREAT SERPL-MCNC: 5.4 MG/DL (ref 0.5–1.4)
CRP SERPL-MCNC: 11.5 MG/L (ref 0–8.2)
DIFFERENTIAL METHOD: ABNORMAL
EOSINOPHIL # BLD AUTO: 0.1 K/UL (ref 0–0.5)
EOSINOPHIL NFR BLD: 1 % (ref 0–8)
ERYTHROCYTE [DISTWIDTH] IN BLOOD BY AUTOMATED COUNT: 19.9 % (ref 11.5–14.5)
ERYTHROCYTE [SEDIMENTATION RATE] IN BLOOD BY PHOTOMETRIC METHOD: 53 MM/HR (ref 0–36)
EST. GFR  (NO RACE VARIABLE): 11.7 ML/MIN/1.73 M^2
EST. GFR  (NO RACE VARIABLE): 8.7 ML/MIN/1.73 M^2
FERRITIN SERPL-MCNC: 750 NG/ML (ref 20–300)
GLUCOSE SERPL-MCNC: 224 MG/DL (ref 70–110)
GLUCOSE SERPL-MCNC: 246 MG/DL (ref 70–110)
GLUCOSE SERPL-MCNC: 246 MG/DL (ref 70–110)
GLUCOSE SERPL-MCNC: 333 MG/DL (ref 70–110)
HCT VFR BLD AUTO: 33.5 % (ref 37–48.5)
HGB BLD-MCNC: 10.9 G/DL (ref 12–16)
IMM GRANULOCYTES # BLD AUTO: 0.01 K/UL (ref 0–0.04)
IMM GRANULOCYTES NFR BLD AUTO: 0.2 % (ref 0–0.5)
INR PPP: 1.3 (ref 0.8–1.2)
IRON SERPL-MCNC: 27 UG/DL (ref 30–160)
LACTATE SERPL-SCNC: 0.9 MMOL/L (ref 0.5–2.2)
LYMPHOCYTES # BLD AUTO: 0.8 K/UL (ref 1–4.8)
LYMPHOCYTES NFR BLD: 16.4 % (ref 18–48)
MAGNESIUM SERPL-MCNC: 2.2 MG/DL (ref 1.6–2.6)
MAGNESIUM SERPL-MCNC: 2.3 MG/DL (ref 1.6–2.6)
MCH RBC QN AUTO: 29.7 PG (ref 27–31)
MCHC RBC AUTO-ENTMCNC: 32.5 G/DL (ref 32–36)
MCV RBC AUTO: 91 FL (ref 82–98)
MONOCYTES # BLD AUTO: 0.6 K/UL (ref 0.3–1)
MONOCYTES NFR BLD: 12.1 % (ref 4–15)
NEUTROPHILS # BLD AUTO: 3.6 K/UL (ref 1.8–7.7)
NEUTROPHILS NFR BLD: 69.5 % (ref 38–73)
NRBC BLD-RTO: 0 /100 WBC
PHOSPHATE SERPL-MCNC: 4.1 MG/DL (ref 2.7–4.5)
PLATELET # BLD AUTO: 151 K/UL (ref 150–450)
PMV BLD AUTO: ABNORMAL FL (ref 9.2–12.9)
POCT GLUCOSE: 224 MG/DL (ref 70–110)
POCT GLUCOSE: 235 MG/DL (ref 70–110)
POCT GLUCOSE: 241 MG/DL (ref 70–110)
POCT GLUCOSE: 318 MG/DL (ref 70–110)
POCT GLUCOSE: 348 MG/DL (ref 70–110)
POTASSIUM SERPL-SCNC: 3.3 MMOL/L (ref 3.5–5.1)
POTASSIUM SERPL-SCNC: 4.1 MMOL/L (ref 3.5–5.1)
PROCALCITONIN SERPL IA-MCNC: 0.22 NG/ML
PROT SERPL-MCNC: 7.1 G/DL (ref 6–8.4)
PROTHROMBIN TIME: 13.5 SEC (ref 9–12.5)
RBC # BLD AUTO: 3.67 M/UL (ref 4–5.4)
SATURATED IRON: 13 % (ref 20–50)
SODIUM SERPL-SCNC: 136 MMOL/L (ref 136–145)
SODIUM SERPL-SCNC: 136 MMOL/L (ref 136–145)
TOTAL IRON BINDING CAPACITY: 206 UG/DL (ref 250–450)
TRANSFERRIN SERPL-MCNC: 139 MG/DL (ref 200–375)
WBC # BLD AUTO: 5.12 K/UL (ref 3.9–12.7)

## 2023-12-03 PROCEDURE — 99214 OFFICE O/P EST MOD 30 MIN: CPT | Mod: HCNC,GC,, | Performed by: STUDENT IN AN ORGANIZED HEALTH CARE EDUCATION/TRAINING PROGRAM

## 2023-12-03 PROCEDURE — 25000003 PHARM REV CODE 250: Mod: HCNC | Performed by: STUDENT IN AN ORGANIZED HEALTH CARE EDUCATION/TRAINING PROGRAM

## 2023-12-03 PROCEDURE — 36415 COLL VENOUS BLD VENIPUNCTURE: CPT | Mod: HCNC | Performed by: STUDENT IN AN ORGANIZED HEALTH CARE EDUCATION/TRAINING PROGRAM

## 2023-12-03 PROCEDURE — 83880 ASSAY OF NATRIURETIC PEPTIDE: CPT | Mod: HCNC | Performed by: FAMILY MEDICINE

## 2023-12-03 PROCEDURE — 63600175 PHARM REV CODE 636 W HCPCS: Mod: HCNC | Performed by: STUDENT IN AN ORGANIZED HEALTH CARE EDUCATION/TRAINING PROGRAM

## 2023-12-03 PROCEDURE — 99214 PR OFFICE/OUTPT VISIT, EST, LEVL IV, 30-39 MIN: ICD-10-PCS | Mod: HCNC,GC,, | Performed by: STUDENT IN AN ORGANIZED HEALTH CARE EDUCATION/TRAINING PROGRAM

## 2023-12-03 PROCEDURE — 83605 ASSAY OF LACTIC ACID: CPT | Mod: HCNC | Performed by: FAMILY MEDICINE

## 2023-12-03 PROCEDURE — 83735 ASSAY OF MAGNESIUM: CPT | Mod: 91,HCNC | Performed by: STUDENT IN AN ORGANIZED HEALTH CARE EDUCATION/TRAINING PROGRAM

## 2023-12-03 PROCEDURE — G0378 HOSPITAL OBSERVATION PER HR: HCPCS | Mod: HCNC

## 2023-12-03 PROCEDURE — 80048 BASIC METABOLIC PNL TOTAL CA: CPT | Mod: HCNC,XB | Performed by: STUDENT IN AN ORGANIZED HEALTH CARE EDUCATION/TRAINING PROGRAM

## 2023-12-03 PROCEDURE — 96372 THER/PROPH/DIAG INJ SC/IM: CPT | Performed by: STUDENT IN AN ORGANIZED HEALTH CARE EDUCATION/TRAINING PROGRAM

## 2023-12-03 PROCEDURE — 84100 ASSAY OF PHOSPHORUS: CPT | Mod: HCNC | Performed by: FAMILY MEDICINE

## 2023-12-03 PROCEDURE — 80053 COMPREHEN METABOLIC PANEL: CPT | Mod: HCNC | Performed by: FAMILY MEDICINE

## 2023-12-03 PROCEDURE — 82962 GLUCOSE BLOOD TEST: CPT | Mod: HCNC

## 2023-12-03 PROCEDURE — 85025 COMPLETE CBC W/AUTO DIFF WBC: CPT | Mod: HCNC | Performed by: STUDENT IN AN ORGANIZED HEALTH CARE EDUCATION/TRAINING PROGRAM

## 2023-12-03 PROCEDURE — 96375 TX/PRO/DX INJ NEW DRUG ADDON: CPT | Mod: HCNC

## 2023-12-03 PROCEDURE — 83735 ASSAY OF MAGNESIUM: CPT | Mod: HCNC | Performed by: FAMILY MEDICINE

## 2023-12-03 PROCEDURE — 96372 THER/PROPH/DIAG INJ SC/IM: CPT | Mod: 59 | Performed by: STUDENT IN AN ORGANIZED HEALTH CARE EDUCATION/TRAINING PROGRAM

## 2023-12-03 RX ORDER — SODIUM BICARBONATE 650 MG/1
650 TABLET ORAL 3 TIMES DAILY
COMMUNITY
End: 2023-12-21 | Stop reason: SDUPTHER

## 2023-12-03 RX ORDER — MUPIROCIN 20 MG/G
OINTMENT TOPICAL 2 TIMES DAILY
Status: DISCONTINUED | OUTPATIENT
Start: 2023-12-03 | End: 2023-12-05 | Stop reason: HOSPADM

## 2023-12-03 RX ORDER — POTASSIUM CHLORIDE 20 MEQ/1
40 TABLET, EXTENDED RELEASE ORAL ONCE
Status: COMPLETED | OUTPATIENT
Start: 2023-12-03 | End: 2023-12-03

## 2023-12-03 RX ORDER — SODIUM BICARBONATE 650 MG/1
1300 TABLET ORAL 3 TIMES DAILY
Status: DISCONTINUED | OUTPATIENT
Start: 2023-12-03 | End: 2023-12-04

## 2023-12-03 RX ORDER — FUROSEMIDE 10 MG/ML
40 INJECTION INTRAMUSCULAR; INTRAVENOUS ONCE
Status: COMPLETED | OUTPATIENT
Start: 2023-12-03 | End: 2023-12-03

## 2023-12-03 RX ORDER — SODIUM BICARBONATE 650 MG/1
650 TABLET ORAL 3 TIMES DAILY
Status: DISCONTINUED | OUTPATIENT
Start: 2023-12-03 | End: 2023-12-03

## 2023-12-03 RX ORDER — SODIUM BICARBONATE 650 MG/1
650 TABLET ORAL ONCE
Status: COMPLETED | OUTPATIENT
Start: 2023-12-03 | End: 2023-12-03

## 2023-12-03 RX ADMIN — SERTRALINE HYDROCHLORIDE 50 MG: 50 TABLET ORAL at 08:12

## 2023-12-03 RX ADMIN — INSULIN ASPART 2 UNITS: 100 INJECTION, SOLUTION INTRAVENOUS; SUBCUTANEOUS at 08:12

## 2023-12-03 RX ADMIN — CALCIUM ACETATE 1334 MG: 667 CAPSULE ORAL at 05:12

## 2023-12-03 RX ADMIN — INSULIN ASPART 2 UNITS: 100 INJECTION, SOLUTION INTRAVENOUS; SUBCUTANEOUS at 12:12

## 2023-12-03 RX ADMIN — CLOPIDOGREL BISULFATE 75 MG: 75 TABLET ORAL at 08:12

## 2023-12-03 RX ADMIN — HYDRALAZINE HYDROCHLORIDE 50 MG: 50 TABLET ORAL at 03:12

## 2023-12-03 RX ADMIN — APIXABAN 5 MG: 5 TABLET, FILM COATED ORAL at 08:12

## 2023-12-03 RX ADMIN — CALCIUM ACETATE 1334 MG: 667 CAPSULE ORAL at 12:12

## 2023-12-03 RX ADMIN — ISOSORBIDE DINITRATE 40 MG: 20 TABLET ORAL at 08:12

## 2023-12-03 RX ADMIN — INSULIN DETEMIR 8 UNITS: 100 INJECTION, SOLUTION SUBCUTANEOUS at 08:12

## 2023-12-03 RX ADMIN — CARVEDILOL 6.25 MG: 6.25 TABLET, FILM COATED ORAL at 08:12

## 2023-12-03 RX ADMIN — HYDRALAZINE HYDROCHLORIDE 50 MG: 50 TABLET ORAL at 08:12

## 2023-12-03 RX ADMIN — POTASSIUM CHLORIDE 40 MEQ: 1500 TABLET, EXTENDED RELEASE ORAL at 09:12

## 2023-12-03 RX ADMIN — SODIUM BICARBONATE 650 MG TABLET 1300 MG: at 03:12

## 2023-12-03 RX ADMIN — SODIUM BICARBONATE 650 MG TABLET 650 MG: at 11:12

## 2023-12-03 RX ADMIN — SODIUM BICARBONATE 650 MG TABLET 650 MG: at 10:12

## 2023-12-03 RX ADMIN — ATORVASTATIN CALCIUM 80 MG: 40 TABLET, FILM COATED ORAL at 08:12

## 2023-12-03 RX ADMIN — CALCIUM ACETATE 1334 MG: 667 CAPSULE ORAL at 08:12

## 2023-12-03 RX ADMIN — FUROSEMIDE 40 MG: 10 INJECTION, SOLUTION INTRAVENOUS at 11:12

## 2023-12-03 RX ADMIN — ALLOPURINOL 50 MG: 300 TABLET ORAL at 09:12

## 2023-12-03 RX ADMIN — SODIUM BICARBONATE 650 MG TABLET 1300 MG: at 08:12

## 2023-12-03 RX ADMIN — CALCITRIOL CAPSULES 0.5 MCG 0.5 MCG: 0.5 CAPSULE ORAL at 08:12

## 2023-12-03 RX ADMIN — INSULIN ASPART 3 UNITS: 100 INJECTION, SOLUTION INTRAVENOUS; SUBCUTANEOUS at 05:12

## 2023-12-03 RX ADMIN — MUPIROCIN: 20 OINTMENT TOPICAL at 08:12

## 2023-12-03 RX ADMIN — ISOSORBIDE DINITRATE 40 MG: 20 TABLET ORAL at 03:12

## 2023-12-03 NOTE — NURSING
..Nurses Note -- 4 Eyes      12/3/2023   4:24 PM      Skin assessed during: Admit      [x] No Altered Skin Integrity Present    []Prevention Measures Documented      [] Yes- Altered Skin Integrity Present or Discovered   [] LDA Added if Not in Epic (Describe Wound)   [] New Altered Skin Integrity was Present on Admit and Documented in LDA   [] Wound Image Taken    Wound Care Consulted? No    Attending Nurse:  Malissa Lin RN/Staff Member:   Matilda

## 2023-12-03 NOTE — ED NOTES
Pt identifiers Suyapa Connelly were checked and are correct  LOC: The patient is awake, alert, aware of environment with an appropriate affect. Oriented x3, Reoriented to time  speaking appropriately  APPEARANCE: Pt resting comfortably, in no acute distress, pt is clean and well groomed, clothing properly fastened  SKIN: Skin warm, dry and intact, normal skin turgor, moist mucus membranes  RESPIRATORY: Airway is open and patent, respirations are spontaneous, even and unlabored, normal effort and rate  CARDIAC: Normal rate and rhythm, no peripheral edema noted, capillary refill < 3 seconds, bilateral radial pulses 2+  Dialysis access site noted to left upper chest  Pt is on a cardiac monitor   ABDOMEN: Soft, nontender, nondistended. Bowel sounds present   NEUROLOGIC: PERRL, facial expression is symmetrical, patient moving all extremities spontaneously, normal sensation in all extremities when touched with a finger.  Follows all commands appropriately  MUSCULOSKELETAL: Tyrell  AKA

## 2023-12-03 NOTE — ASSESSMENT & PLAN NOTE
Creatine stable for now. BMP reviewed- noted Estimated Creatinine Clearance: 17.2 mL/min (A) (based on SCr of 4.2 mg/dL (H)). according to latest data. Based on current GFR, CKD stage is end stage.  Monitor UOP and serial BMP and adjust therapy as needed. Renally dose meds. Avoid nephrotoxic medications and procedures.    Continue calcitriol

## 2023-12-03 NOTE — ED NOTES
Pt transported to room 39284 via stretcher with escort on tele box  Pt condition stable on leaving the ED, pt belongings are with pt and pt's  was notified of room number

## 2023-12-03 NOTE — ASSESSMENT & PLAN NOTE
Chronic, uncontrolled. Latest blood pressure and vitals reviewed-     Temp:  [93.2 °F (34 °C)-98.9 °F (37.2 °C)]   Pulse:  []   Resp:  [16-20]   BP: (113-181)/(53-94)   SpO2:  [96 %-100 %] .   Home meds for hypertension were reviewed and noted below.   Hypertension Medications               carvediloL (COREG) 6.25 MG tablet Take 1 tablet (6.25 mg total) by mouth 2 (two) times daily.    hydrALAZINE (APRESOLINE) 50 MG tablet Take 1 tablet (50 mg total) by mouth 3 (three) times daily.    isosorbide dinitrate (ISORDIL) 20 MG tablet Take 2 tablets (40 mg total) by mouth 3 (three) times daily.            While in the hospital, will manage blood pressure as follows; Continue home antihypertensive regimen.  Will hold steady for tonight and adjust in morning if uncontrolled overnight.    Will utilize p.r.n. blood pressure medication only if patient's blood pressure greater than 160/100 and she develops symptoms such as worsening chest pain or shortness of breath.

## 2023-12-03 NOTE — SUBJECTIVE & OBJECTIVE
Interval History: Patient depressed/withdrawn on exam. Denies symptoms. Denies chest pain, difficulty breathing, changes in recent health. States she would like to be discharged.     Review of Systems  Objective:     Vital Signs (Most Recent):  Temp: 98.3 °F (36.8 °C) (12/03/23 0725)  Pulse: 92 (12/03/23 0725)  Resp: 16 (12/03/23 0725)  BP: (!) 149/71 (12/03/23 0725)  SpO2: 98 % (12/03/23 0725) Vital Signs (24h Range):  Temp:  [93.2 °F (34 °C)-98.9 °F (37.2 °C)] 98.3 °F (36.8 °C)  Pulse:  [] 92  Resp:  [16-20] 16  SpO2:  [96 %-100 %] 98 %  BP: (113-181)/(53-94) 149/71     Weight: 98.9 kg (218 lb)  Body mass index is 36.28 kg/m².    Intake/Output Summary (Last 24 hours) at 12/3/2023 1050  Last data filed at 12/2/2023 2302  Gross per 24 hour   Intake 50 ml   Output --   Net 50 ml         Physical Exam  Vitals and nursing note reviewed.   Constitutional:       General: She is not in acute distress.     Appearance: Normal appearance. She is obese. She is not ill-appearing or toxic-appearing.      Comments: Bilateral AKA with well-appearing scar.  Previous CABG scar.  Vas-Cath for HD access to the left upper chest wall   HENT:      Head: Normocephalic and atraumatic.      Mouth/Throat:      Mouth: Mucous membranes are moist.   Eyes:      General: No scleral icterus.  Cardiovascular:      Rate and Rhythm: Regular rhythm. Tachycardia present.      Pulses: Normal pulses.      Heart sounds: Normal heart sounds.   Pulmonary:      Effort: No respiratory distress.      Breath sounds: Examination of the right-lower field reveals rales. Examination of the left-lower field reveals rales. Rales present.   Abdominal:      General: There is no distension.      Palpations: Abdomen is soft. There is no mass.      Tenderness: There is no abdominal tenderness. There is no right CVA tenderness or left CVA tenderness.   Musculoskeletal:      Cervical back: No rigidity.   Lymphadenopathy:      Cervical: No cervical adenopathy.    Skin:     General: Skin is warm and dry.      Capillary Refill: Capillary refill takes less than 2 seconds.      Coloration: Skin is not jaundiced.   Neurological:      General: No focal deficit present.      Mental Status: She is alert and oriented to person, place, and time.   Psychiatric:         Mood and Affect: Affect is flat.             Significant Labs: All pertinent labs within the past 24 hours have been reviewed.  Blood Culture:   Recent Labs   Lab 12/02/23  2317   LABBLOO No Growth to date  No Growth to date     CBC:   Recent Labs   Lab 12/02/23  1723 12/03/23  0302   WBC 5.17 5.12   HGB 11.9* 10.9*   HCT 37.5 33.5*    151     CMP:   Recent Labs   Lab 12/02/23  1723 12/03/23  0403   * 136   K 3.9 3.3*   CL 97 97   CO2 25 24   * 246*   BUN 36* 43*   CREATININE 3.8* 4.2*   CALCIUM 8.1* 7.8*   PROT 8.0 7.1   ALBUMIN 2.8* 2.4*   BILITOT 1.6* 1.1*   ALKPHOS 221* 192*   AST 23 19   ALT 11 9*   ANIONGAP 13 15     POCT Glucose:   Recent Labs   Lab 12/02/23  1750 12/02/23  1928 12/02/23  2016   POCTGLUCOSE 161* 183* 185*     Urine Studies:   Recent Labs   Lab 12/02/23  2246   COLORU Yellow   APPEARANCEUA Hazy*   PHUR 8.0   SPECGRAV 1.020   PROTEINUA 3+*   GLUCUA Trace*   KETONESU Negative   BILIRUBINUA Negative   OCCULTUA Trace*   NITRITE Negative   LEUKOCYTESUR 3+*   RBCUA 5*   WBCUA 22*   BACTERIA Few*   SQUAMEPITHEL 11   HYALINECASTS 0       Significant Imaging: I have reviewed all pertinent imaging results/findings within the past 24 hours.

## 2023-12-03 NOTE — HPI
Patient is a 58-year-old female with past medical history significant for hypertension, hyperlipidemia, ESRD Monday Wednesday Friday, bilateral lower extremity AKAs who is presenting with acute altered mental status during her dialysis.  She was on the dialysis for approximately 2 hours when she started to become progressively more fatigued and then eventually became unresponsive.  They stopped dialysis and called for ambulance to take her for emergency evaluation.  They did not check her sugars.  Around arrival to the emergency department broad labs were sent off which identified hypoglycemia in the 30s her which she was treated with oral glucose.  She did not initially improve and so she was given IV dextrose which resulted in her normalizing.  Her  states that she is not a regular eater and often will not eat as much in the hospital as when she is out and about and seems to have highly variable intake.  He states that she had not eaten breakfast this morning and taken her normal dose of insulin and he believes that that is why she had the episode.  The  expressed frustration at having issues with her sugars in the past.  They want to establish with endocrinology to see if there is a better way for them to manage her sugars.    In the emergency department, her vitals were found to be hypothermic and this was confirmed with a rectal temperature.  She has a history of AFib which is borderline tachycardic (although history of AFib) and she was placed on a Daniel Hugger.  No empiric antibiotics were given and no cultures were collected.  She denies any obvious signs or symptoms suggestive of a source and her chest x-ray is suggestive of vascular congestion but no overall consolidation.  They deny any home fevers, sick contacts, dysuria or urinary CV symptoms.  She continues to urinate and was recently increased to furosemide 40 mg b.i.d..  She denies any cough or chest pain or shortness of breath.

## 2023-12-03 NOTE — ASSESSMENT & PLAN NOTE
Patient with Long standing persistent (>12 months) atrial fibrillation which is controlled currently with Beta Blocker. Patient is currently in atrial fibrillation.[id number] Score: 3. HASBLED Score: 3. Anticoagulation indicated. Anticoagulation done with apixaban .      Plan:  - Rate control with target HR <110  - carvedilol 6.25 b.i.d.  - Hold for SBP <90/60 or P<45 bpm  - Patient's [id number] score is 4, annual risk of stroke is 6.7  - Score > 1 requires anticoagulation preferably with Apixaban  - HAS-BLED Score:  - Hypertension (+1)  Yes  - Impaired Renal Function (Dialysis, transplant, Cr >2.26 mg/dL) (+1)  Yes  - Impaired Liver Function (Cirrhosis or bilirubin >2x normal with AST/ALT/AP >3x normal) (+1)  No  - History of Stroke (+1)  No  - History of Bleeding (+1)  No  - Labile INRs (Unstable/high INRs, time in therapeutic range <60%) (+1)  No  - >65 years (+1)  no  - Drugs (Antiplatelet agents, NSAIDs) (+1)  Yes  - Alcohol Consumption (+1)  No    - Total Score:  3  - Risk of bleedin.8%    - Score >=3 indicates high bleeding risk.  - HAS-BLED > CHADVAS indicates risk greater than benefit.  - Magnesium > 2, Potassium > 4  - stable however will monitor with telemetry for 24 hours given hypoglycemia and hypothermia.  - Synchronized cardioversion if hemodynamically unstable

## 2023-12-03 NOTE — ASSESSMENT & PLAN NOTE
Patient is a 58-year-old woman with past medical history of ESRD Monday Wednesday Friday, bilateral AKA, presenting for altered mental status and subsequently found to be hypoglycemic and hypothermic.  She was given p.o. and IV glucose with stabilization of her blood glucose and mental status.  She states that she is back to normal baseline and her  at bedside agrees however there is no clear explanation as to why she had this episode of hypothermia and hypoglycemia.  She denies any fevers or chills.  No lab work is suggestive source of infection and imaging is largely unremarkable. Antibiotics not started on admission due to low suspicion for sepsis and infection.   Unexplained fever:  Hypothermia being treated as fever equivalent > no further episodes.  Lactate within normal limits  - low threshold to start antibiotics

## 2023-12-03 NOTE — H&P
[facility] - Emergency Dept  [facility]  History & Physical    Patient Name: Suyapa Connelly  MRN: [id number]  Patient Class: OP- Observation  Admission Date: 12/2/2023  Attending Physician: George De Oliveira MD   Primary Care Provider: Magen Christensen MD         Patient information was obtained from patient, past medical records, and ER records.     Subjective:     Principal Problem:Hypothermia    Chief Complaint:   Chief Complaint   Patient presents with    Fatigue     Taken off dialysis due to bp high        HPI: Patient is a 58-year-old female with past medical history significant for hypertension, hyperlipidemia, ESRD Monday Wednesday Friday, bilateral lower extremity AKAs who is presenting with acute altered mental status during her dialysis.  She was on the dialysis for approximately 2 hours when she started to become progressively more fatigued and then eventually became unresponsive.  They stopped dialysis and called for ambulance to take her for emergency evaluation.  They did not check her sugars.  Around arrival to the emergency department broad labs were sent off which identified hypoglycemia in the 30s her which she was treated with oral glucose.  She did not initially improve and so she was given IV dextrose which resulted in her normalizing.  Her  states that she is not a regular eater and often will not eat as much in the hospital as when she is out and about and seems to have highly variable intake.  He states that she had not eaten breakfast this morning and taken her normal dose of insulin and he believes that that is why she had the episode.  The  expressed frustration at having issues with her sugars in the past.  They want to establish with endocrinology to see if there is a better way for them to manage her sugars.    In the emergency department, her vitals were found to be hypothermic and this was confirmed with a rectal temperature.  She has a history of AFib which  is borderline tachycardic (although history of AFib) and she was placed on a Daniel Hugger.  No empiric antibiotics were given and no cultures were collected.  She denies any obvious signs or symptoms suggestive of a source and her chest x-ray is suggestive of vascular congestion but no overall consolidation.  They deny any home fevers, sick contacts, dysuria or urinary CV symptoms.  She continues to urinate and was recently increased to furosemide 40 mg b.i.d..  She denies any cough or chest pain or shortness of breath.      Past Medical History:   Diagnosis Date    Anxiety     Chronic pain syndrome     CKD (chronic kidney disease), stage III     Depression     Diabetes mellitus     type 2    Diabetes mellitus, type 2     GERD (gastroesophageal reflux disease)     Hyperemesis 3/23/2021    Hypokalemia 3/23/2021    Infection of below knee amputation stump 3/12/2022    Osteomyelitis     Osteomyelitis of left foot 4/30/2021    Ulcer of left foot     Vaginal delivery     x1       Past Surgical History:   Procedure Laterality Date    ABOVE-KNEE AMPUTATION Left 5/18/2021    Procedure: AMPUTATION, ABOVE KNEE;  Surgeon: Teddy Huber MD;  Location: [address];  Service: Vascular;  Laterality: Left;    ABOVE-KNEE AMPUTATION Right 3/18/2022    Procedure: AMPUTATION, ABOVE KNEE;  Surgeon: DAYNE Florez II, MD;  Location: [address];  Service: Vascular;  Laterality: Right;    Angiogram - Right Extremity Right 7/9/15    angiogram-left leg  10/6/15    ANGIOGRAPHY OF LOWER EXTREMITY Left 4/29/2021    Procedure: ANGIOGRAM, LOWER EXTREMITY;  Surgeon: Teddy Huber MD;  Location: [address];  Service: Vascular;  Laterality: Left;    BELOW KNEE AMPUTATION OF LOWER EXTREMITY Right 12/28/2021    Procedure: AMPUTATION, BELOW KNEE;  Surgeon: Kaitlyn Rojas MD;  Location: [facility] OR;  Service: General;  Laterality: Right;    CATHETERIZATION OF BOTH LEFT AND RIGHT HEART N/A 12/18/2019    Procedure:  CATHETERIZATION, HEART, BOTH LEFT AND RIGHT;  Surgeon: Que Fernando III, MD;  Location: [facility] CATH LAB;  Service: Cardiology;  Laterality: N/A;    CORONARY ANGIOGRAPHY N/A 12/18/2019    Procedure: ANGIOGRAM, CORONARY ARTERY;  Surgeon: Que Fernando III, MD;  Location: [facility] CATH LAB;  Service: Cardiology;  Laterality: N/A;    CORONARY ANGIOGRAPHY INCLUDING BYPASS GRAFTS WITH CATHETERIZATION OF LEFT HEART N/A 7/28/2020    Procedure: ANGIOGRAM, CORONARY, INCLUDING BYPASS GRAFT, WITH LEFT HEART CATHETERIZATION, 9 am;  Surgeon: Rachel Easley MD;  Location: [facility] CATH LAB;  Service: Cardiology;  Laterality: N/A;    CORONARY ARTERY BYPASS GRAFT (CABG) N/A 1/14/2020    Procedure: CORONARY ARTERY BYPASS GRAFT (CABG) x 1     Off Pump;  Surgeon: Huang Altamirano MD;  Location: [facility] OR [address] [place] [address];  Service: Cardiovascular;  Laterality: N/A;    CREATION OF FEMORAL-TIBIAL ARTERY BYPASS Left 4/29/2021    Procedure: CREATION, BYPASS, ARTERIAL, FEMORAL TO ANTERIOR TIBIAL;  Surgeon: Teddy Huber MD;  Location: [facility] OR [address] [place] [address];  Service: Vascular;  Laterality: Left;    CREATION OF FEMOROPOPLITEAL ARTERIAL BYPASS USING GRAFT Left 8/18/2020    Procedure: CREATION, BYPASS, ARTERIAL, FEMORAL TO POPLITEAL, USING GRAFT, LEFT LOWER EXTREMITY;  Surgeon: Teddy Huber MD;  Location: [facility];  Service: Vascular;  Laterality: Left;  REQUEST 7:15 A.M. START----COVID NEGATIVE ON 8/17  1ST CASE JASWANT PER LEANA ON 8/7/2020 @ 942AM-LO  RN PREOP 8/12/2020   T/S-----CLEARED BY CARDS-------PENDING INSURANCE    DEBRIDEMENT OF FOOT Left 9/8/2020    Procedure: DEBRIDEMENT, FOOT;  Surgeon: Rosio Mayes DPM;  Location: [facility] OR;  Service: Podiatry;  Laterality: Left;  request neoxx .   RN Pre Op 9-4-2020, Covid negative on 9/5/20. C A    DEBRIDEMENT OF FOOT  3/4/2021    Procedure: DEBRIDEMENT, FOOT;  Surgeon: Teddy Huber MD;  Location: [facility] OR;  Service: Vascular;;    DEBRIDEMENT OF FOOT Left 3/9/2021    Procedure:  DEBRIDEMENT, FOOT, bone biopsy;  Surgeon: Rosio Mayes DPM;  Location: [facility] OR;  Service: Podiatry;  Laterality: Left;  Request neoxx---COVID IN AM  REQUESTING NOON START  RN Phone Pre op.On Blood thinners Plavix and Eliquis.  Covid am of surgery. C A    DEBRIDEMENT OF FOOT Left 5/4/2021    Procedure: DEBRIDEMENT, FOOT;  Surgeon: Farooq Morley DPM;  Location: [facility] OR 2ND FLR;  Service: Podiatry;  Laterality: Left;    INSERTION OF TUNNELED CENTRAL VENOUS HEMODIALYSIS CATHETER N/A 1/27/2020    Procedure: Insertion, Catheter, Central Venous, Hemodialysis;  Surgeon: ESTEBAN Gomez III, MD;  Location: [facility] CATH LAB;  Service: Peripheral Vascular;  Laterality: N/A;    INSERTION OF TUNNELED CENTRAL VENOUS HEMODIALYSIS CATHETER  5/10/2023    Procedure: Insertion, Catheter, Central Venous, Hemodialysis;  Surgeon: Romulo Queen MD;  Location: [facility] CATH LAB;  Service: Cardiology;;    PERCUTANEOUS TRANSLUMINAL ANGIOPLASTY N/A 3/4/2021    Procedure: PTA (ANGIOPLASTY, PERCUTANEOUS, TRANSLUMINAL);  Surgeon: Teddy Huber MD;  Location: [facility] OR;  Service: Vascular;  Laterality: N/A;    REMOVAL OF ARTERIOVENOUS GRAFT Left 5/27/2021    Procedure: REMOVAL, GRAFT, LEFT LOWER EXTREMITY, WOUND EXPLORATION;  Surgeon: Teddy Huber MD;  [address];  Service: Vascular;  Laterality: Left;    REMOVAL OF NAIL OF DIGIT Left 3/9/2021    Procedure: REMOVAL, NAIL, DIGIT;  Surgeon: Rosio Mayes DPM;  Location: [facility] OR;  Service: Podiatry;  Laterality: Left;    RIGHT HEART CATHETERIZATION Right 5/10/2023    Procedure: INSERTION, CATHETER, RIGHT HEART;  Surgeon: Romulo Queen MD;  Location: [facility] CATH LAB;  Service: Cardiology;  Laterality: Right;    THROMBECTOMY Left 3/4/2021    Procedure: THROMBECTOMY, LEFT LOWER EXTREMITY BYPASS GRAFT, ANGIOGRAM, POSSIBLE INTERVENTION, POSSIBLE LEFT LOWER EXTREMITY BYPASS;  Surgeon: Teddy Huber MD;  Location: [facility] OR;  Service: Vascular;  Laterality: Left;  "   THROMBECTOMY Left 4/29/2021    Procedure: GRAFT THROMBECTOMY, LEFT LOWER EXTREMITY;  Surgeon: Teddy Huber MD;  Location: [facility] OR [address] [place] [address];  Service: Vascular;  Laterality: Left;  14.5 min  1179.85 mGy  341.01 Gycm2  240 ml dye    THROMBECTOMY  10/22/2021    Procedure: THROMBECTOMY;  Surgeon: Saad Arenas MD;  Location: [facility] CATH LAB/EP;  Service: Cardiology;;       Review of patient's allergies indicates:   Allergen Reactions    Ciprofloxacin Itching    Contrast media      Kidney injury    Iodine      Kidney injury       No current facility-administered medications on file prior to encounter.     Current Outpatient Medications on File Prior to Encounter   Medication Sig    allopurinoL (ZYLOPRIM) 100 MG tablet Take 0.5 tablets (50 mg total) by mouth every other day.    apixaban (ELIQUIS) 5 mg Tab Take 1 tablet (5 mg total) by mouth 2 (two) times daily.    atorvastatin (LIPITOR) 80 MG tablet Take 1 tablet (80 mg total) by mouth once daily.    calcitRIOL (ROCALTROL) 0.5 MCG Cap Take 1 capsule (0.5 mcg total) by mouth once daily.    calcium acetate,phosphat bind, (PHOSLO) 667 mg capsule Take 1 capsule (667 mg total) by mouth 3 (three) times daily with meals. (Patient taking differently: Take 1,334 mg by mouth 3 (three) times daily with meals.)    carvediloL (COREG) 6.25 MG tablet Take 1 tablet (6.25 mg total) by mouth 2 (two) times daily.    clopidogreL (PLAVIX) 75 mg tablet Take 1 tablet (75 mg total) by mouth once daily.    hydrALAZINE (APRESOLINE) 50 MG tablet Take 1 tablet (50 mg total) by mouth 3 (three) times daily.    insulin detemir U-100, Levemir, 100 unit/mL (3 mL) SubQ InPn pen Inject 8 Units into the skin every evening.    isosorbide dinitrate (ISORDIL) 20 MG tablet Take 2 tablets (40 mg total) by mouth 3 (three) times daily.    pen needle, diabetic 32 gauge x 5/32" Ndle Use to inject insulin once daily    sertraline (ZOLOFT) 50 MG tablet Take 1 tablet (50 mg total) by mouth once daily.    " [DISCONTINUED] famotidine (PEPCID) 20 MG tablet Take 1 tablet (20 mg total) by mouth once daily.    [DISCONTINUED] lancing device Misc 1 Device by Misc.(Non-Drug; Combo Route) route 2 (two) times daily with meals.     Family History       Problem Relation (Age of Onset)    Diabetes Mother, Father, Paternal Grandmother    Heart disease Maternal Grandmother    No Known Problems Maternal Grandfather, Paternal Grandfather          Tobacco Use    Smoking status: Former    Smokeless tobacco: Never   Substance and Sexual Activity    Alcohol use: No    Drug use: Yes     Types: Marijuana     Comment: occassional    Sexual activity: Yes     Partners: Male     Review of Systems   Constitutional:  Positive for activity change and fatigue. Negative for chills and fever.   HENT:  Negative for congestion and sore throat.    Eyes:  Negative for visual disturbance.   Respiratory:  Negative for chest tightness and shortness of breath.    Cardiovascular:  Negative for chest pain and palpitations.   Gastrointestinal:  Negative for abdominal distention, abdominal pain, constipation, diarrhea, nausea and vomiting.   Musculoskeletal:  Negative for back pain.   Skin:  Negative for rash.   Neurological:  Negative for dizziness, weakness and headaches.   Hematological:  Does not bruise/bleed easily.     Objective:     Vital Signs (Most Recent):  Temp: 98.4 °F (36.9 °C) (12/02/23 2240)  Pulse: 87 (12/02/23 2240)  Resp: 18 (12/02/23 2240)  BP: 117/63 (12/02/23 2240)  SpO2: 100 % (12/02/23 2240) Vital Signs (24h Range):  Temp:  [93.2 °F (34 °C)-98.4 °F (36.9 °C)] 98.4 °F (36.9 °C)  Pulse:  [] 87  Resp:  [16-20] 18  SpO2:  [96 %-100 %] 100 %  BP: (114-181)/(56-94) 117/63     Weight: 98.9 kg (218 lb)  Body mass index is 36.28 kg/m².     Physical Exam  Vitals and nursing note reviewed.   Constitutional:       General: She is not in acute distress.     Appearance: Normal appearance. She is obese. She is not ill-appearing or toxic-appearing.       Comments: Eating well at bedside.  Bilateral AKA with well-appearing scar.  Previous CABG scar.  Vas-Cath for HD access to the left upper chest wall   HENT:      Head: Normocephalic and atraumatic.      Mouth/Throat:      Mouth: Mucous membranes are moist.   Eyes:      General: No scleral icterus.  Cardiovascular:      Rate and Rhythm: Regular rhythm. Tachycardia present.      Pulses: Normal pulses.      Heart sounds: Normal heart sounds.   Pulmonary:      Effort: No respiratory distress.      Breath sounds: Rhonchi and rales present.   Abdominal:      General: There is no distension.      Palpations: Abdomen is soft. There is no mass.      Tenderness: There is no abdominal tenderness. There is no right CVA tenderness or left CVA tenderness.   Musculoskeletal:      Cervical back: No rigidity.      Right lower leg: No edema.      Left lower leg: No edema.   Lymphadenopathy:      Cervical: No cervical adenopathy.   Skin:     General: Skin is warm and dry.      Capillary Refill: Capillary refill takes less than 2 seconds.      Coloration: Skin is not jaundiced.   Neurological:      General: No focal deficit present.      Mental Status: She is alert and oriented to person, place, and time.                Significant Labs: All pertinent labs within the past 24 hours have been reviewed.    Significant Imaging: I have reviewed all pertinent imaging results/findings within the past 24 hours.  Assessment/Plan:     * Hypothermia  Patient is a 58-year-old woman with past medical history of ESRD Monday Wednesday Friday, bilateral AKA, presenting for altered mental status and subsequently found to be hypoglycemic and hypothermic.  She was given p.o. and IV glucose with stabilization of her blood glucose and mental status.  She states that she is back to normal baseline and her  at bedside agrees however there is no clear explanation as to why she had this episode of hypothermia and hypoglycemia.  She denies any  fevers or chills.  No lab work is suggestive source of infection and imaging is largely unremarkable.    Given the presence of rhonchi and rales on her lung exam, I will initiate treatment with ceftriaxone and azithromycin for pneumonia  should she have another episode suggestive of sepsis.  However I will hold them at the moment as I have a low suspicion for sepsis after examining the patient.  I will obtain inflammatory markers to help guide decisions moving forward.    I will defer the hypoglycemia workup at the moment as she is not demonstrated recurrent hypoglycemia that is not otherwise explained by her not taking a meal after her insulin.    Unexplained fever:  Hypothermia being treated as fever equivalent    Plan:  - ESR/CRP/procalcitonin  - Fluid resuscitation being held in the setting of ESRD with no hypotension  - Blood and urine cultures  - Serum Lactate Q4 hours until below 2.6 or clear trend towards normal  - Consider using capillary refill time to guide resuscitation as adjunct  - antibiotic therapy with Ceftriaxone and Azithromycin  - Removal of any intravascular line which may be a source of infection.  Line does not appear to be currently infected      Hypoglycemia  See hypothermia.  Glucoses on admission were 30 and self corrected following oral and IV glucose.  No antibiotics were given, no blood cultures were collected      Severe obesity (BMI 35.0-39.9) with comorbidity  Body mass index is 36.28 kg/m². Morbid obesity complicates all aspects of disease management from diagnostic modalities to treatment. Weight loss encouraged and health benefits explained to patient.     Encouraged patient to continue to monitor for pressure injury given her profound debility from bilateral AKA        Chronic kidney disease-mineral and bone disorder  Creatine stable for now. BMP reviewed- noted Estimated Creatinine Clearance: 19 mL/min (A) (based on SCr of 3.8 mg/dL (H)). according to latest data. Based on  current GFR, CKD stage is end stage.  Monitor UOP and serial BMP and adjust therapy as needed. Renally dose meds. Avoid nephrotoxic medications and procedures.    Continue calcitriol    ESRD on dialysis  Creatine stable for now. BMP reviewed- noted Estimated Creatinine Clearance: 19 mL/min (A) (based on SCr of 3.8 mg/dL (H)). according to latest data. Based on current GFR, CKD stage is end stage.  Monitor UOP and serial BMP and adjust therapy as needed. Renally dose meds. Avoid nephrotoxic medications and procedures.    Still produces urine approximately 5 to 6 times a day.  Furosemide was recently adjusted to 40 b.i.d. p.o.    Debility  Patient with Chronic debility due to bed confinement status, other reduced mobility, and bilateral above knee amputations . Latest AMPAC and GEMS scores have not been reviewed. Evaluation for etiology is complete. Plan includes fall precautions in place.    Chronic without change in mobility.  Reminded to monitor for pressure injuries.  Continues to remain mobile and active via wheelchair with minor assistance from .    S/P AKA (above knee amputation) bilateral  See debility      Chronic combined systolic and diastolic heart failure  Currently stable.  Continue to monitor.  She is ESRD so dialysis would be easy however she does continue to make urine so furosemide would not be unreasonable if she were to develop hypoxia.      Paroxysmal atrial fibrillation  Patient with Long standing persistent (>12 months) atrial fibrillation which is controlled currently with Beta Blocker. Patient is currently in atrial fibrillation.[id number] Score: 3. HASBLED Score: 3. Anticoagulation indicated. Anticoagulation done with apixaban .      Plan:  - Rate control with target HR <110  - carvedilol 6.25 b.i.d.  - Hold for SBP <90/60 or P<45 bpm  - Patient's [id number] score is 4, annual risk of stroke is 6.7  - Score > 1 requires anticoagulation preferably with Apixaban  - HAS-BLED Score:  -  Hypertension (+1)  Yes  - Impaired Renal Function (Dialysis, transplant, Cr >2.26 mg/dL) (+1)  Yes  - Impaired Liver Function (Cirrhosis or bilirubin >2x normal with AST/ALT/AP >3x normal) (+1)  No  - History of Stroke (+1)  No  - History of Bleeding (+1)  No  - Labile INRs (Unstable/high INRs, time in therapeutic range <60%) (+1)  No  - >65 years (+1)  no  - Drugs (Antiplatelet agents, NSAIDs) (+1)  Yes  - Alcohol Consumption (+1)  No    - Total Score:  3  - Risk of bleedin.8%    - Score >=3 indicates high bleeding risk.  - HAS-BLED > CHADVAS indicates risk greater than benefit.  - Magnesium > 2, Potassium > 4  - stable however will monitor with telemetry for 24 hours given hypoglycemia and hypothermia.  - Synchronized cardioversion if hemodynamically unstable          S/P CABG x 1  Continue clopidogrel.  Not on aspirin due to apixaban.      CAD (coronary artery disease)  Patient with known CAD s/p CABG, which is controlled Will continue  apixaban, Plavix, and Statin and monitor for S/Sx of angina/ACS. Continue to monitor on telemetry.     Type 2 diabetes mellitus with hyperglycemia, with long-term current use of insulin  Last A1c:   Lab Results   Component Value Date    HGBA1C 10.5 (H) 2023      Home regimen:  Detemir 8 q.h.s.    Current inpatient regimen:  Antihyperglycemics (From admission, onward)      Start     Stop Route Frequency Ordered    23 2100  insulin detemir U-100 (Levemir) pen 8 Units         -- SubQ Nightly 23 2251    23 2349  insulin aspart U-100 pen 0-5 Units         -- SubQ Before meals & nightly PRN 23 2251          - continue detemir 8 q.h.s. plus low-dose SSI      Plan:  - Diabetic diet  - POCT glucose ACHS  - Will monitor glucose results and adjust insulin regimen accordingly    Blood Sugars (AccuCheck):    Recent Labs     23  1614 23  1631 23  1750 23  1928 23  2016   POCTGLUCOSE 39* 47* 161* 183* 185*           Essential  hypertension  Chronic, uncontrolled. Latest blood pressure and vitals reviewed-     Temp:  [93.2 °F (34 °C)-98.4 °F (36.9 °C)]   Pulse:  []   Resp:  [16-20]   BP: (114-181)/(56-94)   SpO2:  [96 %-100 %] .   Home meds for hypertension were reviewed and noted below.   Hypertension Medications               carvediloL (COREG) 6.25 MG tablet Take 1 tablet (6.25 mg total) by mouth 2 (two) times daily.    hydrALAZINE (APRESOLINE) 50 MG tablet Take 1 tablet (50 mg total) by mouth 3 (three) times daily.    isosorbide dinitrate (ISORDIL) 20 MG tablet Take 2 tablets (40 mg total) by mouth 3 (three) times daily.            While in the hospital, will manage blood pressure as follows; Continue home antihypertensive regimen.  Will hold steady for tonight and adjust in morning if uncontrolled overnight.    Will utilize p.r.n. blood pressure medication only if patient's blood pressure greater than 160/100 and she develops symptoms such as worsening chest pain or shortness of breath.      VTE Risk Mitigation (From admission, onward)           Ordered     apixaban tablet 5 mg  2 times daily         12/02/23 2251     IP VTE HIGH RISK PATIENT  Once         12/02/23 2251     Place sequential compression device  Until discontinued         12/02/23 2251     Reason for No Pharmacological VTE Prophylaxis  Once        Question:  Reasons:  Answer:  Already adequately anticoagulated on oral Anticoagulants    12/02/23 2251     Place sequential compression device  Until discontinued         12/02/23 2251                       On 12/02/2023, patient should be placed in hospital observation services under my care.             Constantine Westbrook MD  Department of Hospital Medicine  [facility] - Emergency Dept

## 2023-12-03 NOTE — ASSESSMENT & PLAN NOTE
Last A1c:   Lab Results   Component Value Date    HGBA1C 10.5 (H) 12/02/2023      Home regimen:  Detemir 8 q.h.s.    Current inpatient regimen:  Antihyperglycemics (From admission, onward)      Start     Stop Route Frequency Ordered    12/03/23 2100  insulin detemir U-100 (Levemir) pen 8 Units         -- SubQ Nightly 12/02/23 2251    12/02/23 2349  insulin aspart U-100 pen 0-5 Units         -- SubQ Before meals & nightly PRN 12/02/23 2251          - continue detemir 8 q.h.s. plus low-dose SSI      Plan:  - Diabetic diet  - POCT glucose ACHS  - Will monitor glucose results and adjust insulin regimen accordingly    Blood Sugars (AccuCheck):    Recent Labs     12/02/23  1614 12/02/23  1631 12/02/23  1750 12/02/23  1928 12/02/23 2016   POCTGLUCOSE 39* 47* 161* 183* 185*

## 2023-12-03 NOTE — HPI
Ms Connelly is a 58-year-old female with past medical history significant for uncontrolled DM c/b bilateral AKA, HTN, HDL, anxiety  and ESRD on Monday Wednesday Friday, who presented from HD center with acute altered mental status during her dialysis.  She was on the dialysis for approximately 2 hours when she started to become progressively more fatigued and then eventually became unresponsive.  They stopped dialysis and called for ambulance. Upon arrival to the to ED found to have hypoglycemia in the 30s. Oral glucose an IV dextrose which resulted in her normalizing.  Per  she had not eaten breakfast and believes she used her regular dose of insulin that is why she had the episode.         She denied any obvious signs or symptoms suggestive of a source and her chest x-ray is suggestive of vascular congestion but no overall consolidation. They deny any home fevers, sick contacts, dysuria or urinary CV symptoms.  She continues to produce urine.

## 2023-12-03 NOTE — ASSESSMENT & PLAN NOTE
Enlarged multi-fibroid uterus with extensive calcifications seen on previous imaging.  -Consider CT abdomen pelvis , OK to use contrast (mass in uterus)

## 2023-12-03 NOTE — CARE UPDATE
"RAPID RESPONSE NURSE CHART REVIEW        Chart Reviewed: 12/02/2023, 10:58 PM    MRN: [id number]  Bed: ED 16/16    Dx: <principal problem not specified>    Suyapa Connelly has a past medical history of Anxiety, Chronic pain syndrome, CKD (chronic kidney disease), stage III, Depression, Diabetes mellitus, Diabetes mellitus, type 2, GERD (gastroesophageal reflux disease), Hyperemesis, Hypokalemia, Infection of below knee amputation stump, Osteomyelitis, Osteomyelitis of left foot, Ulcer of left foot, and Vaginal delivery.    Last VS: /63   Pulse 87   Temp 98.4 °F (36.9 °C) (Oral)   Resp 18   Ht 5' 5" (1.651 m)   Wt 98.9 kg (218 lb)   LMP 09/30/2015 (Exact Date)   SpO2 100%   Breastfeeding No   BMI 36.28 kg/m²     24H Vital Sign Range:  Temp:  [93.2 °F (34 °C)-98.4 °F (36.9 °C)]   Pulse:  []   Resp:  [16-20]   BP: (114-181)/(56-94)   SpO2:  [96 %-100 %]     Level of Consciousness (AVPU): responds to voice    Recent Labs     12/02/23  1723   WBC 5.17   HGB 11.9*   HCT 37.5          Recent Labs     12/02/23  1723   *   K 3.9   CL 97   CO2 25   BUN 36*   CREATININE 3.8*   *   MG 1.8            MEWS score:      Rounding completed with charge RN Cat. contacted for hypoglycemia. Reports CBG completed when pt hypothermic reading 39, repeat 47, CMP revealed Glu 149 without intervention. No additional concerns verbalized at this time. Instructed to call [phone] for further concerns or assistance.    Rush Weston RN          "

## 2023-12-03 NOTE — PLAN OF CARE
"[facility] - [facility] Dept  Initial Discharge Assessment       Primary Care Provider: Magen Christensen MD    Admission Diagnosis: Hypothermia, initial encounter [T68.XXXA]    Admission Date: 12/2/2023  Expected Discharge Date:     Transition of Care Barriers: (P) None    Payor: HU[employer] MANAGED MEDICARE / Plan: HU[employer] MEDICARE HMO / Product Type: Capitation /     Extended Emergency Contact Information  Primary Emergency Contact: Randell Connelly  Address: [address] [place] [address]  Home Phone: [phone]  Relation: Spouse  Secondary Emergency Contact: Cornelia Connelly  Address: [address] [place] [address]  Home Phone: [phone]  Relation: Daughter    Discharge Plan A: (P) Home, Home with family  Discharge Plan B: (P) Home, Home with family      [id number] - [place], [place] - [address] AT [facility] [address]  [address]  [facility] [address]  Phone: [phone] Fax: [phone]    [facility] - [place], [place] - [address]  [address] [facility] [address]  [facility] [address]  Phone: [phone] Fax: [phone]    [id number] - [place], [place] - [address] AT [facility] [address]   [address] [place] [address]  Phone: [phone] Fax: [phone]      Initial Assessment (most recent)       Adult Discharge Assessment - 12/02/23 2304          Discharge Assessment    Assessment Type Discharge Planning Assessment (P)      Confirmed/corrected address, phone number and insurance Yes (P)      Confirmed Demographics Correct on Facesheet (P)      Source of Information patient (P)      When was your last doctors appointment? -- (P)    3 months ago    Does patient/caregiver understand observation status Yes (P)      Communicated DEVAN with patient/caregiver Yes (P)      Reason For Admission "nausea" (P)      People in Home spouse (P)   "    Facility Arrived From: Home (P)      Do you expect to return to your current living situation? Yes (P)      Do you have help at home or someone to help you manage your care at home? Yes (P)      Who are your caregiver(s) and their phone number(s)?  (P)      Prior to hospitilization cognitive status: Alert/Oriented (P)      Current cognitive status: Alert/Oriented (P)      Walking or Climbing Stairs ambulation difficulty, requires equipment;stair climbing difficulty, requires equipment;transferring difficulty, requires equipment (P)      Mobility Management wheel chair (P)      Dressing/Bathing bathing difficulty, assistance 1 person (P)      Dressing/Bathing Management shower chair (P)      Home Accessibility wheelchair accessible (P)      Home Layout Able to live on 1st floor (P)      Equipment Currently Used at Home wheelchair;shower chair (P)      Readmission within 30 days? Yes (P)      Patient currently being followed by outpatient case management? No (P)      Do you currently have service(s) that help you manage your care at home? No (P)      Do you take prescription medications? Yes (P)      Do you have prescription coverage? Yes (P)      Coverage medicare (P)      Do you have any problems affording any of your prescribed medications? No (P)      Is the patient taking medications as prescribed? yes (P)      Who is going to help you get home at discharge?  (P)      How do you get to doctors appointments? family or friend will provide (P)      Are you on dialysis? Yes (P)      Dialysis Name and Scheduled days M/W/F @ 1pm [facility] (P)      Do you take coumadin? No (P)      DME Needed Upon Discharge  none (P)      Discharge Plan discussed with: Patient (P)      Transition of Care Barriers None (P)      Discharge Plan A Home;Home with family (P)      Discharge Plan B Home;Home with family (P)         Physical Activity    On average, how many days per week do you engage in moderate to strenuous  exercise (like a brisk walk)? 0 days (P)      On average, how many minutes do you engage in exercise at this level? 0 min (P)         Financial Resource Strain    How hard is it for you to pay for the very basics like food, housing, medical care, and heating? Not hard at all (P)         Housing Stability    In the last 12 months, was there a time when you were not able to pay the mortgage or rent on time? No (P)      In the last 12 months, how many places have you lived? 1 (P)      In the last 12 months, was there a time when you did not have a steady place to sleep or slept in a shelter (including now)? No (P)         Transportation Needs    In the past 12 months, has lack of transportation kept you from medical appointments or from getting medications? No (P)      In the past 12 months, has lack of transportation kept you from meetings, work, or from getting things needed for daily living? No (P)         Food Insecurity    Within the past 12 months, you worried that your food would run out before you got the money to buy more. Never true (P)      Within the past 12 months, the food you bought just didn't last and you didn't have money to get more. Never true (P)         Stress    Do you feel stress - tense, restless, nervous, or anxious, or unable to sleep at night because your mind is troubled all the time - these days? Rather much (P)         Social Connections    In a typical week, how many times do you talk on the phone with family, friends, or neighbors? More than three times a week (P)      How often do you get together with friends or relatives? Once a week (P)      How often do you attend Jehovah's witness or Mormonism services? Never (P)      Do you belong to any clubs or organizations such as Jehovah's witness groups, unions, fraternal or athletic groups, or school groups? No (P)      How often do you attend meetings of the clubs or organizations you belong to? Never (P)      Are you , , , ,  never , or living with a partner?  (P)         Alcohol Use    Q1: How often do you have a drink containing alcohol? Never (P)      Q2: How many drinks containing alcohol do you have on a typical day when you are drinking? Patient does not drink (P)      Q3: How often do you have six or more drinks on one occasion? Never (P)         OTHER    Name(s) of People in Home  (P)

## 2023-12-03 NOTE — ASSESSMENT & PLAN NOTE
Creatine stable for now. BMP reviewed- noted Estimated Creatinine Clearance: 19 mL/min (A) (based on SCr of 3.8 mg/dL (H)). according to latest data. Based on current GFR, CKD stage is end stage.  Monitor UOP and serial BMP and adjust therapy as needed. Renally dose meds. Avoid nephrotoxic medications and procedures.    Still produces urine approximately 5 to 6 times a day.  Furosemide was recently adjusted to 40 b.i.d. p.o.

## 2023-12-03 NOTE — CONSULTS
[facility]  Nephrology  Consult Note    Patient Name: Suyapa Connelly  MRN: [id number]  Admission Date: 12/2/2023  Hospital Length of Stay: 0 days  Attending Provider: George De Oliveira MD   Primary Care Physician: Magen Christensen MD  Principal Problem:ESRD on dialysis    Inpatient consult to Nephrology  Consult performed by: Norman Mackey MD  Consult ordered by: Constantine Westbrook MD  Reason for consult: esrd on hd        Subjective:     HPI: Ms Connelly is a 58-year-old female with past medical history significant for uncontrolled DM c/b bilateral AKA, HTN, HDL, anxiety  and ESRD on Monday Wednesday Friday, who presented from HD center with acute altered mental status during her dialysis.  She was on the dialysis for approximately 2 hours when she started to become progressively more fatigued and then eventually became unresponsive.  They stopped dialysis and called for ambulance. Upon arrival to the to ED found to have hypoglycemia in the 30s. Oral glucose an IV dextrose which resulted in her normalizing.  Per  she had not eaten breakfast and believes she used her regular dose of insulin that is why she had the episode.         She denied any obvious signs or symptoms suggestive of a source and her chest x-ray is suggestive of vascular congestion but no overall consolidation. They deny any home fevers, sick contacts, dysuria or urinary CV symptoms.  She continues to produce urine.     Review of Systems   Constitutional:  Positive for activity change and fatigue. Negative for chills and fever.   HENT:  Negative for congestion and sore throat.    Eyes:  Negative for visual disturbance.   Respiratory:  Negative for chest tightness and shortness of breath.    Cardiovascular:  Negative for chest pain and palpitations.   Gastrointestinal:  Negative for abdominal distention, abdominal pain, constipation, diarrhea, nausea and vomiting.   Genitourinary:  Negative for flank pain.    Musculoskeletal:  Negative for back pain.   Skin:  Negative for rash.   Neurological:  Negative for dizziness, weakness and headaches.   Hematological:  Does not bruise/bleed easily.   Psychiatric/Behavioral:  Positive for sleep disturbance.         Patient appears to have depressed mood.     Review of patient's allergies indicates:   Allergen Reactions    Ciprofloxacin Itching    Contrast media      Kidney injury    Iodine      Kidney injury     Current Facility-Administered Medications   Medication Frequency    acetaminophen tablet 1,000 mg Q8H PRN    acetaminophen tablet 650 mg Q4H PRN    allopurinol split tablet 50 mg Every other day    apixaban tablet 5 mg BID    atorvastatin tablet 80 mg Daily    calcitRIOL capsule 0.5 mcg Daily    calcium acetate(phosphat bind) capsule 1,334 mg TID WM    carvediloL tablet 6.25 mg BID    clopidogreL tablet 75 mg Daily    dextrose 10% bolus 125 mL 125 mL PRN    dextrose 10% bolus 250 mL 250 mL PRN    glucagon (human recombinant) injection 1 mg PRN    glucose chewable tablet 16 g PRN    glucose chewable tablet 24 g PRN    hydrALAZINE tablet 50 mg TID    insulin aspart U-100 pen 0-5 Units QID (AC + HS) PRN    insulin detemir U-100 (Levemir) pen 8 Units QHS    isosorbide dinitrate tablet 40 mg TID    melatonin tablet 6 mg Nightly PRN    naloxone 0.4 mg/mL injection 0.02 mg PRN    potassium chloride SA CR tablet 40 mEq Once    sertraline tablet 50 mg Daily    sodium chloride 0.9% flush 10 mL PRN    sodium chloride 0.9% flush 10 mL PRN     Current Outpatient Medications   Medication    allopurinoL (ZYLOPRIM) 100 MG tablet    apixaban (ELIQUIS) 5 mg Tab    atorvastatin (LIPITOR) 80 MG tablet    calcitRIOL (ROCALTROL) 0.5 MCG Cap    calcium acetate,phosphat bind, (PHOSLO) 667 mg capsule    carvediloL (COREG) 6.25 MG tablet    clopidogreL (PLAVIX) 75 mg tablet    hydrALAZINE (APRESOLINE) 50 MG tablet    insulin detemir U-100, Levemir, 100 unit/mL (3 mL) SubQ InPn pen    isosorbide  "dinitrate (ISORDIL) 20 MG tablet    pen needle, diabetic 32 gauge x 5/32" Ndle    sertraline (ZOLOFT) 50 MG tablet       Objective:     Vital Signs (Most Recent):  Temp: 98.3 °F (36.8 °C) (12/03/23 0725)  Pulse: 92 (12/03/23 0725)  Resp: 16 (12/03/23 0725)  BP: (!) 149/71 (12/03/23 0725)  SpO2: 98 % (12/03/23 0725) Vital Signs (24h Range):  Temp:  [93.2 °F (34 °C)-98.9 °F (37.2 °C)] 98.3 °F (36.8 °C)  Pulse:  [] 92  Resp:  [16-20] 16  SpO2:  [96 %-100 %] 98 %  BP: (113-181)/(53-94) 149/71     Weight: 98.9 kg (218 lb) (12/02/23 1611)  Body mass index is 36.28 kg/m².  Body surface area is 2.13 meters squared.    I/O last 3 completed shifts:  In: 50 [I.V.:50]  Out: -      Physical Exam  Vitals and nursing note reviewed.   Constitutional:       General: She is not in acute distress.     Appearance: Normal appearance. She is obese. She is not ill-appearing or toxic-appearing.      Comments: Eating well at bedside.  Bilateral AKA with well-appearing scar.  Previous CABG scar.  Vas-Cath for HD access to the left upper chest wall   HENT:      Head: Normocephalic and atraumatic.      Mouth/Throat:      Mouth: Mucous membranes are moist.   Eyes:      General: No scleral icterus.  Cardiovascular:      Rate and Rhythm: Regular rhythm.      Pulses: Normal pulses.      Heart sounds: Normal heart sounds.   Pulmonary:      Effort: No respiratory distress.      Breath sounds: Rales present.   Abdominal:      General: There is no distension.      Palpations: Abdomen is soft. There is no mass.      Tenderness: There is no abdominal tenderness. There is no right CVA tenderness or left CVA tenderness.   Musculoskeletal:      Cervical back: No rigidity.      Lymphadenopathy:      Cervical: No cervical adenopathy.   Skin:     General: Skin is warm and dry.      Capillary Refill: Capillary refill takes less than 2 seconds.      Coloration: Skin is not jaundiced.   Neurological:      General: No focal deficit present.      Mental " Status: She is alert and oriented to person, place, and time.          Significant Labs:  CBC:   Recent Labs   Lab 12/03/23  0302   WBC 5.12   RBC 3.67*   HGB 10.9*   HCT 33.5*      MCV 91   MCH 29.7   MCHC 32.5     CMP:   Recent Labs   Lab 12/03/23  0403   *   CALCIUM 7.8*   ALBUMIN 2.4*   PROT 7.1      K 3.3*   CO2 24   CL 97   BUN 43*   CREATININE 4.2*   ALKPHOS 192*   ALT 9*   AST 19   BILITOT 1.1*     All labs within the past 24 hours have been reviewed.     Significant Imaging:     Assessment/Plan:     Renal/  Chronic kidney disease-mineral and bone disorder     - Cont calcitriol 0.5mg  - Cont calcium acetate 1334mg  - Stop sevelamer 800mg TID.   - Renal diet with protein intake goal 1.5 g/kg/d with 1 L fluid restriction   - Novasource with meals  - Daily renal panel so that phos and albumin is monitored daily.     ESRD on dialysis  Ms Connelly is an ESRD  patient admitted after hypoglycemic episode in the setting insulin administration per . She has HD MWF at [facility].    Lab Results   Component Value Date    CREATININE 4.2 (H) 12/03/2023       -BUN of 43, no sih=gne of uremia, co2 of 24  -k is 3.3  - No HD  needed today. Will reasses in AM               Thank you for your consult. I will follow-up with patient. Please contact us if you have any additional questions.    Norman Mackey MD  Nephrology  [facility]

## 2023-12-03 NOTE — ED NOTES
Telemetry Verification   Patient placed on Telemetry Box  Verified on ED monitor  Box [id number]   Monitor Tech Mary    Rate 82   Rhythm Sinus rhythm

## 2023-12-03 NOTE — ASSESSMENT & PLAN NOTE
Patient is a 58-year-old woman with past medical history of ESRD Monday Wednesday Friday, bilateral AKA, presenting for altered mental status and subsequently found to be hypoglycemic and hypothermic.  She was given p.o. and IV glucose with stabilization of her blood glucose and mental status.  She states that she is back to normal baseline and her  at bedside agrees however there is no clear explanation as to why she had this episode of hypothermia and hypoglycemia.  She denies any fevers or chills.  No lab work is suggestive source of infection and imaging is largely unremarkable.    Given the presence of rhonchi and rales on her lung exam, I will initiate treatment with ceftriaxone and azithromycin for pneumonia  should she have another episode suggestive of sepsis.  However I will hold them at the moment as I have a low suspicion for sepsis after examining the patient.  I will obtain inflammatory markers to help guide decisions moving forward.    Unexplained fever:  Hypothermia being treated as fever equivalent    Plan:  - ESR/CRP/procalcitonin  - Fluid resuscitation being held in the setting of ESRD with no hypotension  - Blood and urine cultures  - Serum Lactate Q4 hours until below 2.6 or clear trend towards normal  - Consider using capillary refill time to guide resuscitation as adjunct  - antibiotic therapy with Ceftriaxone and Azithromycin  - Removal of any intravascular line which may be a source of infection.  Line does not appear to be currently infected

## 2023-12-03 NOTE — ASSESSMENT & PLAN NOTE
Per  hypoglycemic 2/2 to insulin in the setting of skipping meal.  -A1c with considerable increase in the past 7 months from high 6s to 10s.   -Needs optimum glucose control  - requesting consult with endocrinology while inpatient  -Recommend referral to outpatient diabetes education which would benefit  who is his main caregiver

## 2023-12-03 NOTE — ASSESSMENT & PLAN NOTE
- Cont calcitriol 0.5mg  - Cont calcium acetate 1334mg  - Stop sevelamer 800mg TID.   - Renal diet with protein intake goal 1.5 g/kg/d with 1 L fluid restriction   - Novasource with meals  - Daily renal panel so that phos and albumin is monitored daily.

## 2023-12-03 NOTE — ASSESSMENT & PLAN NOTE
Patient with Long standing persistent (>12 months) atrial fibrillation which is controlled currently with Beta Blocker. Patient is currently in atrial fibrillation.[id number] Score: 3. HASBLED Score: 3. Anticoagulation indicated. Anticoagulation done with apixaban .      Plan:  - Rate control with target HR <110  - carvedilol 6.25 b.i.d.  - Hold for SBP <90/60 or P<45 bpm  - Patient's [id number] score is 4, annual risk of stroke is 6.7  - Score > 1 requires anticoagulation preferably with Apixaban  - HAS-BLED Score:  - Hypertension (+1)  Yes  - Impaired Renal Function (Dialysis, transplant, Cr >2.26 mg/dL) (+1)  Yes  - Impaired Liver Function (Cirrhosis or bilirubin >2x normal with AST/ALT/AP >3x normal) (+1)  No  - History of Stroke (+1)  No  - History of Bleeding (+1)  No  - Labile INRs (Unstable/high INRs, time in therapeutic range <60%) (+1)  No  - >65 years (+1)  no  - Drugs (Antiplatelet agents, NSAIDs) (+1)  Yes  - Alcohol Consumption (+1)  No    - Total Score:  3  - Risk of bleedin.8%    - Score >=3 indicates high bleeding risk.  - HAS-BLED > CHADVAS indicates risk greater than benefit.  - Magnesium > 2, Potassium > 4  - stable however will monitor with telemetry for 24 hours given hypoglycemia and hypothermia.  - Synchronized cardioversion if hemodynamically unstable

## 2023-12-03 NOTE — ASSESSMENT & PLAN NOTE
Chronic, uncontrolled. Latest blood pressure and vitals reviewed-     Temp:  [93.2 °F (34 °C)-98.4 °F (36.9 °C)]   Pulse:  []   Resp:  [16-20]   BP: (114-181)/(56-94)   SpO2:  [96 %-100 %] .   Home meds for hypertension were reviewed and noted below.   Hypertension Medications               carvediloL (COREG) 6.25 MG tablet Take 1 tablet (6.25 mg total) by mouth 2 (two) times daily.    hydrALAZINE (APRESOLINE) 50 MG tablet Take 1 tablet (50 mg total) by mouth 3 (three) times daily.    isosorbide dinitrate (ISORDIL) 20 MG tablet Take 2 tablets (40 mg total) by mouth 3 (three) times daily.            While in the hospital, will manage blood pressure as follows; Continue home antihypertensive regimen.  Will hold steady for tonight and adjust in morning if uncontrolled overnight.    Will utilize p.r.n. blood pressure medication only if patient's blood pressure greater than 160/100 and she develops symptoms such as worsening chest pain or shortness of breath.

## 2023-12-03 NOTE — SUBJECTIVE & OBJECTIVE
"    Review of patient's allergies indicates:   Allergen Reactions    Ciprofloxacin Itching    Contrast media      Kidney injury    Iodine      Kidney injury     Current Facility-Administered Medications   Medication Frequency    acetaminophen tablet 1,000 mg Q8H PRN    acetaminophen tablet 650 mg Q4H PRN    allopurinol split tablet 50 mg Every other day    apixaban tablet 5 mg BID    atorvastatin tablet 80 mg Daily    calcitRIOL capsule 0.5 mcg Daily    calcium acetate(phosphat bind) capsule 1,334 mg TID WM    carvediloL tablet 6.25 mg BID    clopidogreL tablet 75 mg Daily    dextrose 10% bolus 125 mL 125 mL PRN    dextrose 10% bolus 250 mL 250 mL PRN    glucagon (human recombinant) injection 1 mg PRN    glucose chewable tablet 16 g PRN    glucose chewable tablet 24 g PRN    hydrALAZINE tablet 50 mg TID    insulin aspart U-100 pen 0-5 Units QID (AC + HS) PRN    insulin detemir U-100 (Levemir) pen 8 Units QHS    isosorbide dinitrate tablet 40 mg TID    melatonin tablet 6 mg Nightly PRN    naloxone 0.4 mg/mL injection 0.02 mg PRN    potassium chloride SA CR tablet 40 mEq Once    sertraline tablet 50 mg Daily    sodium chloride 0.9% flush 10 mL PRN    sodium chloride 0.9% flush 10 mL PRN     Current Outpatient Medications   Medication    allopurinoL (ZYLOPRIM) 100 MG tablet    apixaban (ELIQUIS) 5 mg Tab    atorvastatin (LIPITOR) 80 MG tablet    calcitRIOL (ROCALTROL) 0.5 MCG Cap    calcium acetate,phosphat bind, (PHOSLO) 667 mg capsule    carvediloL (COREG) 6.25 MG tablet    clopidogreL (PLAVIX) 75 mg tablet    hydrALAZINE (APRESOLINE) 50 MG tablet    insulin detemir U-100, Levemir, 100 unit/mL (3 mL) SubQ InPn pen    isosorbide dinitrate (ISORDIL) 20 MG tablet    pen needle, diabetic 32 gauge x 5/32" Ndle    sertraline (ZOLOFT) 50 MG tablet       Objective:     Vital Signs (Most Recent):  Temp: 98.3 °F (36.8 °C) (12/03/23 0725)  Pulse: 92 (12/03/23 0725)  Resp: 16 (12/03/23 0725)  BP: (!) 149/71 (12/03/23 " 0725)  SpO2: 98 % (12/03/23 0725) Vital Signs (24h Range):  Temp:  [93.2 °F (34 °C)-98.9 °F (37.2 °C)] 98.3 °F (36.8 °C)  Pulse:  [] 92  Resp:  [16-20] 16  SpO2:  [96 %-100 %] 98 %  BP: (113-181)/(53-94) 149/71     Weight: 98.9 kg (218 lb) (12/02/23 1611)  Body mass index is 36.28 kg/m².  Body surface area is 2.13 meters squared.    I/O last 3 completed shifts:  In: 50 [I.V.:50]  Out: -      Physical Exam  Vitals and nursing note reviewed.   Constitutional:       General: She is not in acute distress.     Appearance: Normal appearance. She is obese. She is not ill-appearing or toxic-appearing.      Comments: Eating well at bedside.  Bilateral AKA with well-appearing scar.  Previous CABG scar.  Vas-Cath for HD access to the left upper chest wall   HENT:      Head: Normocephalic and atraumatic.      Mouth/Throat:      Mouth: Mucous membranes are moist.   Eyes:      General: No scleral icterus.  Cardiovascular:      Rate and Rhythm: Regular rhythm.      Pulses: Normal pulses.      Heart sounds: Normal heart sounds.   Pulmonary:      Effort: No respiratory distress.      Breath sounds: Rales present.   Abdominal:      General: There is no distension.      Palpations: Abdomen is soft. There is no mass.      Tenderness: There is no abdominal tenderness. There is no right CVA tenderness or left CVA tenderness.   Musculoskeletal:      Cervical back: No rigidity.      Right lower leg: No edema.      Left lower leg: No edema.   Lymphadenopathy:      Cervical: No cervical adenopathy.   Skin:     General: Skin is warm and dry.      Capillary Refill: Capillary refill takes less than 2 seconds.      Coloration: Skin is not jaundiced.   Neurological:      General: No focal deficit present.      Mental Status: She is alert and oriented to person, place, and time.          Significant Labs:  CBC:   Recent Labs   Lab 12/03/23  0302   WBC 5.12   RBC 3.67*   HGB 10.9*   HCT 33.5*      MCV 91   MCH 29.7   MCHC 32.5     CMP:    Recent Labs   Lab 12/03/23  0403   *   CALCIUM 7.8*   ALBUMIN 2.4*   PROT 7.1      K 3.3*   CO2 24   CL 97   BUN 43*   CREATININE 4.2*   ALKPHOS 192*   ALT 9*   AST 19   BILITOT 1.1*     All labs within the past 24 hours have been reviewed.     Significant Imaging:

## 2023-12-03 NOTE — ASSESSMENT & PLAN NOTE
Last A1c:   Lab Results   Component Value Date    HGBA1C 10.5 (H) 12/02/2023      Home regimen:  Detemir 8 q.h.s.    Current inpatient regimen:  Antihyperglycemics (From admission, onward)      Start     Stop Route Frequency Ordered    12/03/23 2100  insulin detemir U-100 (Levemir) pen 8 Units         -- SubQ Nightly 12/02/23 2251    12/02/23 2349  insulin aspart U-100 pen 0-5 Units         -- SubQ Before meals & nightly PRN 12/02/23 2251          - continue detemir 8 q.h.s. plus low-dose SSI      Plan:  - Diabetic diet  - POCT glucose ACHS  - Will monitor glucose results and adjust insulin regimen accordingly  - needs outpatient follow-up with endocrine or PCP, desires dexcom    Blood Sugars (AccuCheck):    Recent Labs     12/02/23  1750 12/02/23  1928 12/02/23  2016 12/03/23  0805 12/03/23  1122 12/03/23  1217   POCTGLUCOSE 161* 183* 185* 224* 235* 241*

## 2023-12-03 NOTE — ASSESSMENT & PLAN NOTE
Ms Connelly is an ESRD  patient admitted after hypoglycemic episode in the setting insulin administration per . She has HD MWF at [facility]. [place] [facility] [place] found to have BS in the 30s.        Lab Results   Component Value Date    CREATININE 4.2 (H) 12/03/2023       -BUN of 43, no signs of uremia, co2 of 24  -k is 3.3    Plan:    Continue MWF iHD schedule while IP   Pre and post HD weight   Strict I &Os  Hgb goal 10-11          Performed

## 2023-12-03 NOTE — SUBJECTIVE & OBJECTIVE
Past Medical History:   Diagnosis Date    Anxiety     Chronic pain syndrome     CKD (chronic kidney disease), stage III     Depression     Diabetes mellitus     type 2    Diabetes mellitus, type 2     GERD (gastroesophageal reflux disease)     Hyperemesis 3/23/2021    Hypokalemia 3/23/2021    Infection of below knee amputation stump 3/12/2022    Osteomyelitis     Osteomyelitis of left foot 4/30/2021    Ulcer of left foot     Vaginal delivery     x1       Past Surgical History:   Procedure Laterality Date    ABOVE-KNEE AMPUTATION Left 5/18/2021    Procedure: AMPUTATION, ABOVE KNEE;  Surgeon: Teddy Huber MD;  Location: [facility] OR [address] [place] [address];  Service: Vascular;  Laterality: Left;    ABOVE-KNEE AMPUTATION Right 3/18/2022    Procedure: AMPUTATION, ABOVE KNEE;  Surgeon: DAYNE Florez II, MD;  Location: [facility] OR [address] [place] [address];  Service: Vascular;  Laterality: Right;    Angiogram - Right Extremity Right 7/9/15    angiogram-left leg  10/6/15    ANGIOGRAPHY OF LOWER EXTREMITY Left 4/29/2021    Procedure: ANGIOGRAM, LOWER EXTREMITY;  Surgeon: Teddy Huber MD;  Location: [facility] OR [address] [place] [address];  Service: Vascular;  Laterality: Left;    BELOW KNEE AMPUTATION OF LOWER EXTREMITY Right 12/28/2021    Procedure: AMPUTATION, BELOW KNEE;  Surgeon: Kaitlyn Rojas MD;  Location: [facility] OR;  Service: General;  Laterality: Right;    CATHETERIZATION OF BOTH LEFT AND RIGHT HEART N/A 12/18/2019    Procedure: CATHETERIZATION, HEART, BOTH LEFT AND RIGHT;  Surgeon: Que Fernando III, MD;  Location: [facility] CATH LAB;  Service: Cardiology;  Laterality: N/A;    CORONARY ANGIOGRAPHY N/A 12/18/2019    Procedure: ANGIOGRAM, CORONARY ARTERY;  Surgeon: Que Fernando III, MD;  Location: [facility] CATH LAB;  Service: Cardiology;  Laterality: N/A;    CORONARY ANGIOGRAPHY INCLUDING BYPASS GRAFTS WITH CATHETERIZATION OF LEFT HEART N/A 7/28/2020    Procedure: ANGIOGRAM, CORONARY, INCLUDING BYPASS GRAFT, WITH LEFT HEART CATHETERIZATION, 9 am;  Surgeon:  Rachel Easley MD;  Location: [facility] CATH LAB;  Service: Cardiology;  Laterality: N/A;    CORONARY ARTERY BYPASS GRAFT (CABG) N/A 1/14/2020    Procedure: CORONARY ARTERY BYPASS GRAFT (CABG) x 1     Off Pump;  Surgeon: Huang Altamirano MD;  Location: [facility] OR [address] [place] [address];  Service: Cardiovascular;  Laterality: N/A;    CREATION OF FEMORAL-TIBIAL ARTERY BYPASS Left 4/29/2021    Procedure: CREATION, BYPASS, ARTERIAL, FEMORAL TO ANTERIOR TIBIAL;  Surgeon: Teddy Huber MD;  [facility];  Service: Vascular;  Laterality: Left;    CREATION OF FEMOROPOPLITEAL ARTERIAL BYPASS USING GRAFT Left 8/18/2020    Procedure: CREATION, BYPASS, ARTERIAL, FEMORAL TO POPLITEAL, USING GRAFT, LEFT LOWER EXTREMITY;  Surgeon: Teddy Huber MD;  Location: [facility];  Service: Vascular;  Laterality: Left;  REQUEST 7:15 A.M. START----COVID NEGATIVE ON 8/17 1ST CASE STARTKENYA DUEÑAS ON 8/7/2020 @ 942AM-  RN PREOP 8/12/2020   T/S-----CLEARED BY CARDS-------PENDING INSURANCE    DEBRIDEMENT OF FOOT Left 9/8/2020    Procedure: DEBRIDEMENT, FOOT;  Surgeon: Rosio Mayes DPM;  Location: [facility];  Service: Podiatry;  Laterality: Left;  request neoxx .   RN Pre Op 9-4-2020, Covid negative on 9/5/20. C A    DEBRIDEMENT OF FOOT  3/4/2021    Procedure: DEBRIDEMENT, FOOT;  Surgeon: Teddy Huber MD;  Location: [facility] OR;  Service: Vascular;;    DEBRIDEMENT OF FOOT Left 3/9/2021    Procedure: DEBRIDEMENT, FOOT, bone biopsy;  Surgeon: Rosio Mayes DPM;  Location: [facility] OR;  Service: Podiatry;  Laterality: Left;  Request neoxx---COVID IN AM  REQUESTING NOON START  RN Phone Pre op.On Blood thinners Plavix and Eliquis.  Covid am of surgery. C A    DEBRIDEMENT OF FOOT Left 5/4/2021    Procedure: DEBRIDEMENT, FOOT;  Surgeon: Farooq Morley DPM;  [address];  Service: Podiatry;  Laterality: Left;    INSERTION OF TUNNELED CENTRAL VENOUS HEMODIALYSIS CATHETER N/A 1/27/2020    Procedure: Insertion, Catheter,  Central Venous, Hemodialysis;  Surgeon: ESTEBAN Gomez III, MD;  Location: [facility] CATH LAB;  Service: Peripheral Vascular;  Laterality: N/A;    INSERTION OF TUNNELED CENTRAL VENOUS HEMODIALYSIS CATHETER  5/10/2023    Procedure: Insertion, Catheter, Central Venous, Hemodialysis;  Surgeon: Romulo Queen MD;  Location: [facility] CATH LAB;  Service: Cardiology;;    PERCUTANEOUS TRANSLUMINAL ANGIOPLASTY N/A 3/4/2021    Procedure: PTA (ANGIOPLASTY, PERCUTANEOUS, TRANSLUMINAL);  Surgeon: Teddy Huber MD;  Location: [facility] OR;  Service: Vascular;  Laterality: N/A;    REMOVAL OF ARTERIOVENOUS GRAFT Left 5/27/2021    Procedure: REMOVAL, GRAFT, LEFT LOWER EXTREMITY, WOUND EXPLORATION;  Surgeon: Teddy Huber MD;  Location: [facility] 2ND FLR;  Service: Vascular;  Laterality: Left;    REMOVAL OF NAIL OF DIGIT Left 3/9/2021    Procedure: REMOVAL, NAIL, DIGIT;  Surgeon: Rosio Mayes DPM;  Location: [facility] OR;  Service: Podiatry;  Laterality: Left;    RIGHT HEART CATHETERIZATION Right 5/10/2023    Procedure: INSERTION, CATHETER, RIGHT HEART;  Surgeon: Romulo Queen MD;  Location: [facility] CATH LAB;  Service: Cardiology;  Laterality: Right;    THROMBECTOMY Left 3/4/2021    Procedure: THROMBECTOMY, LEFT LOWER EXTREMITY BYPASS GRAFT, ANGIOGRAM, POSSIBLE INTERVENTION, POSSIBLE LEFT LOWER EXTREMITY BYPASS;  Surgeon: Teddy Huber MD;  Location: [facility] OR;  Service: Vascular;  Laterality: Left;    THROMBECTOMY Left 4/29/2021    Procedure: GRAFT THROMBECTOMY, LEFT LOWER EXTREMITY;  Surgeon: Teddy Huber MD;  Location: [facility] OR 2ND FLR;  Service: Vascular;  Laterality: Left;  14.5 min  1179.85 mGy  341.01 Gycm2  240 ml dye    THROMBECTOMY  10/22/2021    Procedure: THROMBECTOMY;  Surgeon: Saad Arenas MD;  Location: [facility] CATH LAB/EP;  Service: Cardiology;;       Review of patient's allergies indicates:   Allergen Reactions    Ciprofloxacin Itching    Contrast media      Kidney injury    Iodine      Kidney injury  "      No current facility-administered medications on file prior to encounter.     Current Outpatient Medications on File Prior to Encounter   Medication Sig    allopurinoL (ZYLOPRIM) 100 MG tablet Take 0.5 tablets (50 mg total) by mouth every other day.    apixaban (ELIQUIS) 5 mg Tab Take 1 tablet (5 mg total) by mouth 2 (two) times daily.    atorvastatin (LIPITOR) 80 MG tablet Take 1 tablet (80 mg total) by mouth once daily.    calcitRIOL (ROCALTROL) 0.5 MCG Cap Take 1 capsule (0.5 mcg total) by mouth once daily.    calcium acetate,phosphat bind, (PHOSLO) 667 mg capsule Take 1 capsule (667 mg total) by mouth 3 (three) times daily with meals. (Patient taking differently: Take 1,334 mg by mouth 3 (three) times daily with meals.)    carvediloL (COREG) 6.25 MG tablet Take 1 tablet (6.25 mg total) by mouth 2 (two) times daily.    clopidogreL (PLAVIX) 75 mg tablet Take 1 tablet (75 mg total) by mouth once daily.    hydrALAZINE (APRESOLINE) 50 MG tablet Take 1 tablet (50 mg total) by mouth 3 (three) times daily.    insulin detemir U-100, Levemir, 100 unit/mL (3 mL) SubQ InPn pen Inject 8 Units into the skin every evening.    isosorbide dinitrate (ISORDIL) 20 MG tablet Take 2 tablets (40 mg total) by mouth 3 (three) times daily.    pen needle, diabetic 32 gauge x 5/32" Ndle Use to inject insulin once daily    sertraline (ZOLOFT) 50 MG tablet Take 1 tablet (50 mg total) by mouth once daily.    [DISCONTINUED] famotidine (PEPCID) 20 MG tablet Take 1 tablet (20 mg total) by mouth once daily.    [DISCONTINUED] lancing device Misc 1 Device by Misc.(Non-Drug; Combo Route) route 2 (two) times daily with meals.     Family History       Problem Relation (Age of Onset)    Diabetes Mother, Father, Paternal Grandmother    Heart disease Maternal Grandmother    No Known Problems Maternal Grandfather, Paternal Grandfather          Tobacco Use    Smoking status: Former    Smokeless tobacco: Never   Substance and Sexual Activity    Alcohol " use: No    Drug use: Yes     Types: Marijuana     Comment: occassional    Sexual activity: Yes     Partners: Male     Review of Systems   Constitutional:  Positive for activity change and fatigue. Negative for chills and fever.   HENT:  Negative for congestion and sore throat.    Eyes:  Negative for visual disturbance.   Respiratory:  Negative for chest tightness and shortness of breath.    Cardiovascular:  Negative for chest pain and palpitations.   Gastrointestinal:  Negative for abdominal distention, abdominal pain, constipation, diarrhea, nausea and vomiting.   Musculoskeletal:  Negative for back pain.   Skin:  Negative for rash.   Neurological:  Negative for dizziness, weakness and headaches.   Hematological:  Does not bruise/bleed easily.     Objective:     Vital Signs (Most Recent):  Temp: 98.4 °F (36.9 °C) (12/02/23 2240)  Pulse: 87 (12/02/23 2240)  Resp: 18 (12/02/23 2240)  BP: 117/63 (12/02/23 2240)  SpO2: 100 % (12/02/23 2240) Vital Signs (24h Range):  Temp:  [93.2 °F (34 °C)-98.4 °F (36.9 °C)] 98.4 °F (36.9 °C)  Pulse:  [] 87  Resp:  [16-20] 18  SpO2:  [96 %-100 %] 100 %  BP: (114-181)/(56-94) 117/63     Weight: 98.9 kg (218 lb)  Body mass index is 36.28 kg/m².     Physical Exam  Vitals and nursing note reviewed.   Constitutional:       General: She is not in acute distress.     Appearance: Normal appearance. She is obese. She is not ill-appearing or toxic-appearing.      Comments: Eating well at bedside.  Bilateral AKA with well-appearing scar.  Previous CABG scar.  Vas-Cath for HD access to the left upper chest wall   HENT:      Head: Normocephalic and atraumatic.      Mouth/Throat:      Mouth: Mucous membranes are moist.   Eyes:      General: No scleral icterus.  Cardiovascular:      Rate and Rhythm: Regular rhythm. Tachycardia present.      Pulses: Normal pulses.      Heart sounds: Normal heart sounds.   Pulmonary:      Effort: No respiratory distress.      Breath sounds: Rhonchi and rales  present.   Abdominal:      General: There is no distension.      Palpations: Abdomen is soft. There is no mass.      Tenderness: There is no abdominal tenderness. There is no right CVA tenderness or left CVA tenderness.   Musculoskeletal:      Cervical back: No rigidity.      Right lower leg: No edema.      Left lower leg: No edema.   Lymphadenopathy:      Cervical: No cervical adenopathy.   Skin:     General: Skin is warm and dry.      Capillary Refill: Capillary refill takes less than 2 seconds.      Coloration: Skin is not jaundiced.   Neurological:      General: No focal deficit present.      Mental Status: She is alert and oriented to person, place, and time.                Significant Labs: All pertinent labs within the past 24 hours have been reviewed.    Significant Imaging: I have reviewed all pertinent imaging results/findings within the past 24 hours.

## 2023-12-03 NOTE — ED NOTES
Received pt AAO and in NAD, remains on the miguel ángel hugger.  Pt breathing E/U on room air.  Call bell in reach with bed rails up x2.  Pt placed on continuous cardiac, O2, and BP monitoring.  Pt and family advised of plan of care to include all test and procedures. Patient stable at this time; will continue with plan of care.

## 2023-12-03 NOTE — PROGRESS NOTES
[facility] - Emergency Dept  [facility] Medicine  Progress Note    Patient Name: Suyapa Connelly  MRN: [id number]  Patient Class: OP- Observation   Admission Date: 12/2/2023  Length of Stay: 0 days  Attending Physician: George De Oliveira MD  Primary Care Provider: Magen Christensen MD        Subjective:     Principal Problem:Hypothermia        HPI:  Patient is a 58-year-old female with past medical history significant for hypertension, hyperlipidemia, ESRD Monday Wednesday Friday, bilateral lower extremity AKAs who is presenting with acute altered mental status during her dialysis.  She was on the dialysis for approximately 2 hours when she started to become progressively more fatigued and then eventually became unresponsive.  They stopped dialysis and called for ambulance to take her for emergency evaluation.  They did not check her sugars.  Around arrival to the emergency department broad labs were sent off which identified hypoglycemia in the 30s her which she was treated with oral glucose.  She did not initially improve and so she was given IV dextrose which resulted in her normalizing.  Her  states that she is not a regular eater and often will not eat as much in the hospital as when she is out and about and seems to have highly variable intake.  He states that she had not eaten breakfast this morning and taken her normal dose of insulin and he believes that that is why she had the episode.  The  expressed frustration at having issues with her sugars in the past.  They want to establish with endocrinology to see if there is a better way for them to manage her sugars.    In the emergency department, her vitals were found to be hypothermic and this was confirmed with a rectal temperature.  She has a history of AFib which is borderline tachycardic (although history of AFib) and she was placed on a Daniel Hugger.  No empiric antibiotics were given and no cultures were collected.  She denies any obvious  signs or symptoms suggestive of a source and her chest x-ray is suggestive of vascular congestion but no overall consolidation.  They deny any home fevers, sick contacts, dysuria or urinary CV symptoms.  She continues to urinate and was recently increased to furosemide 40 mg b.i.d..  She denies any cough or chest pain or shortness of breath.      Overview/Hospital Course:  Patient admitted for hypoglycemia (insulin use w/o eating) and hypothermia. No evidence of infection on presentation so antibiotics were not initiated. Patinet vital signs have been stable since admission.  is very concerned about patient's state of health.     Heart failure - Per , patient has been having difficulty sleeping and using many pillows at night. Patient denies changes in sleep patterns or difficult breathing. Previously, patient would have peripheral edema but since bilateral AKA that no longer occurs. Patient was referred to cardiology for HFrEF (15%), records show no-show in September 2023.     Diabetes -  reports that patient will restrict her diet because of the limited insulin she has been prescribed. Reports that  shares his insulin with patient if needed. Has been referred to endocrinology in the past but it does not appear appointment was ever scheduled. Discussed in detail that insulin titration is done in the outpatient setting for the best results. As  has noted that patient does not eat much when in the hospital, regimen IP is often not adequate at home.     ESRD -  would like to speak with nephrologist prior to discharge.     Interval History: Patient depressed/withdrawn on exam. Denies symptoms. Denies chest pain, difficulty breathing, changes in recent health. States she would like to be discharged.     Review of Systems  Objective:     Vital Signs (Most Recent):  Temp: 98.3 °F (36.8 °C) (12/03/23 0725)  Pulse: 92 (12/03/23 0725)  Resp: 16 (12/03/23 0725)  BP: (!) 149/71  (12/03/23 0725)  SpO2: 98 % (12/03/23 0725) Vital Signs (24h Range):  Temp:  [93.2 °F (34 °C)-98.9 °F (37.2 °C)] 98.3 °F (36.8 °C)  Pulse:  [] 92  Resp:  [16-20] 16  SpO2:  [96 %-100 %] 98 %  BP: (113-181)/(53-94) 149/71     Weight: 98.9 kg (218 lb)  Body mass index is 36.28 kg/m².    Intake/Output Summary (Last 24 hours) at 12/3/2023 1050  Last data filed at 12/2/2023 2302  Gross per 24 hour   Intake 50 ml   Output --   Net 50 ml         Physical Exam  Vitals and nursing note reviewed.   Constitutional:       General: She is not in acute distress.     Appearance: Normal appearance. She is obese. She is not ill-appearing or toxic-appearing.      Comments: Bilateral AKA with well-appearing scar.  Previous CABG scar.  Vas-Cath for HD access to the left upper chest wall   HENT:      Head: Normocephalic and atraumatic.      Mouth/Throat:      Mouth: Mucous membranes are moist.   Eyes:      General: No scleral icterus.  Cardiovascular:      Rate and Rhythm: Regular rhythm. Tachycardia present.      Pulses: Normal pulses.      Heart sounds: Normal heart sounds.   Pulmonary:      Effort: No respiratory distress.      Breath sounds: Examination of the right-lower field reveals rales. Examination of the left-lower field reveals rales. Rales present.   Abdominal:      General: There is no distension.      Palpations: Abdomen is soft. There is no mass.      Tenderness: There is no abdominal tenderness. There is no right CVA tenderness or left CVA tenderness.   Musculoskeletal:      Cervical back: No rigidity.   Lymphadenopathy:      Cervical: No cervical adenopathy.   Skin:     General: Skin is warm and dry.      Capillary Refill: Capillary refill takes less than 2 seconds.      Coloration: Skin is not jaundiced.   Neurological:      General: No focal deficit present.      Mental Status: She is alert and oriented to person, place, and time.   Psychiatric:         Mood and Affect: Affect is flat.             Significant  Labs: All pertinent labs within the past 24 hours have been reviewed.  Blood Culture:   Recent Labs   Lab 12/02/23  2317   LABBLOO No Growth to date  No Growth to date     CBC:   Recent Labs   Lab 12/02/23  1723 12/03/23  0302   WBC 5.17 5.12   HGB 11.9* 10.9*   HCT 37.5 33.5*    151     CMP:   Recent Labs   Lab 12/02/23  1723 12/03/23  0403   * 136   K 3.9 3.3*   CL 97 97   CO2 25 24   * 246*   BUN 36* 43*   CREATININE 3.8* 4.2*   CALCIUM 8.1* 7.8*   PROT 8.0 7.1   ALBUMIN 2.8* 2.4*   BILITOT 1.6* 1.1*   ALKPHOS 221* 192*   AST 23 19   ALT 11 9*   ANIONGAP 13 15     POCT Glucose:   Recent Labs   Lab 12/02/23  1750 12/02/23  1928 12/02/23  2016   POCTGLUCOSE 161* 183* 185*     Urine Studies:   Recent Labs   Lab 12/02/23  2246   COLORU Yellow   APPEARANCEUA Hazy*   PHUR 8.0   SPECGRAV 1.020   PROTEINUA 3+*   GLUCUA Trace*   KETONESU Negative   BILIRUBINUA Negative   OCCULTUA Trace*   NITRITE Negative   LEUKOCYTESUR 3+*   RBCUA 5*   WBCUA 22*   BACTERIA Few*   SQUAMEPITHEL 11   HYALINECASTS 0       Significant Imaging: I have reviewed all pertinent imaging results/findings within the past 24 hours.    Assessment/Plan:      * Hypothermia  Patient is a 58-year-old woman with past medical history of ESRD Monday Wednesday Friday, bilateral AKA, presenting for altered mental status and subsequently found to be hypoglycemic and hypothermic.  She was given p.o. and IV glucose with stabilization of her blood glucose and mental status.  She states that she is back to normal baseline and her  at bedside agrees however there is no clear explanation as to why she had this episode of hypothermia and hypoglycemia.  She denies any fevers or chills.  No lab work is suggestive source of infection and imaging is largely unremarkable. Antibiotics not started on admission due to low suspicion for sepsis and infection.   Unexplained fever:  Hypothermia being treated as fever equivalent > no further  episodes.  Lactate within normal limits  - low threshold to start antibiotics      Hypoglycemia  See hypothermia.  Glucoses on admission were 30 and self corrected following oral and IV glucose.  No antibiotics were given, no blood cultures were collected. Per , patient will skip meals in attempt to control sugars and reduce insulin need. Reports multiple episodes of hypoglycemia previously. See hospital course for further discussion.  - will monitor glucose while IP        Type 2 diabetes mellitus with hyperglycemia, with long-term current use of insulin  Last A1c:   Lab Results   Component Value Date    HGBA1C 10.5 (H) 12/02/2023      Home regimen:  Detemir 8 q.h.s.    Current inpatient regimen:  Antihyperglycemics (From admission, onward)      Start     Stop Route Frequency Ordered    12/03/23 2100  insulin detemir U-100 (Levemir) pen 8 Units         -- SubQ Nightly 12/02/23 2251    12/02/23 2349  insulin aspart U-100 pen 0-5 Units         -- SubQ Before meals & nightly PRN 12/02/23 2251          - continue detemir 8 q.h.s. plus low-dose SSI      Plan:  - Diabetic diet  - POCT glucose ACHS  - Will monitor glucose results and adjust insulin regimen accordingly  - needs outpatient follow-up with endocrine or PCP, desires dexcom    Blood Sugars (AccuCheck):    Recent Labs     12/02/23  1750 12/02/23  1928 12/02/23  2016 12/03/23  0805 12/03/23  1122 12/03/23  1217   POCTGLUCOSE 161* 183* 185* 224* 235* 241*             ESRD on dialysis  Creatine stable for now. BMP reviewed- noted Estimated Creatinine Clearance: 17.2 mL/min (A) (based on SCr of 4.2 mg/dL (H)). according to latest data. Based on current GFR, CKD stage is end stage.  Monitor UOP and serial BMP and adjust therapy as needed. Renally dose meds. Avoid nephrotoxic medications and procedures.    Still produces urine approximately 5 to 6 times a day.  Furosemide was recently adjusted to 40 b.i.d. p.o.    Chronic kidney disease-mineral and bone  disorder  Creatine stable for now. BMP reviewed- noted Estimated Creatinine Clearance: 17.2 mL/min (A) (based on SCr of 4.2 mg/dL (H)). according to latest data. Based on current GFR, CKD stage is end stage.  Monitor UOP and serial BMP and adjust therapy as needed. Renally dose meds. Avoid nephrotoxic medications and procedures.    Continue calcitriol    Chronic combined systolic and diastolic heart failure  HFrEF 15%  Given lasix 40IV 12/3 for crackles on exam and reported orthopnea.   - cardiology consult   - telemetery  - strict I&Os  - fluid restriction  - goal net negative 1.5L  - continue agressive diuresis  - K>4, Mag>2    Patient is identified as having Combined Systolic and Diastolic heart failure that is Chronic. CHF is currently uncontrolled due to >3 pillow orthopnea and Rales/crackles on pulmonary exam. Latest ECHO performed and demonstrates- Results for orders placed during the hospital encounter of 05/01/23    Echo    Interpretation Summary  · The left ventricle is mildly enlarged with severely decreased systolic function. The estimated ejection fraction is 15%.  · There is severe left ventricular global hypokinesis.  · Normal right ventricular size with low normal right ventricular systolic function.  · Grade I left ventricular diastolic dysfunction.  · Biatrial enlargement.  · Mild-to-moderate mitral regurgitation.  · Severe tricuspid regurgitation.  · The estimated PA systolic pressure is 43 mmHg.  · Elevated central venous pressure (15 mmHg).  . Continue Beta Blocker and Furosemide and monitor clinical status closely. Monitor on telemetry. Patient is off CHF pathway.  Monitor strict Is&Os and daily weights.  Place on fluid restriction of 2 L. Continue to stress to patient importance of self efficacy and  on diet for CHF. Last BNP reviewed- and noted below   Recent Labs   Lab 12/03/23  1233   BNP 1,505*   .            Paroxysmal atrial fibrillation  Patient with Long standing persistent (>12  months) atrial fibrillation which is controlled currently with Beta Blocker. Patient is currently in atrial fibrillation.[id number] Score: 3. HASBLED Score: 3. Anticoagulation indicated. Anticoagulation done with apixaban .      Plan:  - Rate control with target HR <110  - carvedilol 6.25 b.i.d.  - Hold for SBP <90/60 or P<45 bpm  - Patient's [id number] score is 4, annual risk of stroke is 6.7  - Score > 1 requires anticoagulation preferably with Apixaban  - HAS-BLED Score:  - Hypertension (+1)  Yes  - Impaired Renal Function (Dialysis, transplant, Cr >2.26 mg/dL) (+1)  Yes  - Impaired Liver Function (Cirrhosis or bilirubin >2x normal with AST/ALT/AP >3x normal) (+1)  No  - History of Stroke (+1)  No  - History of Bleeding (+1)  No  - Labile INRs (Unstable/high INRs, time in therapeutic range <60%) (+1)  No  - >65 years (+1)  no  - Drugs (Antiplatelet agents, NSAIDs) (+1)  Yes  - Alcohol Consumption (+1)  No    - Total Score:  3  - Risk of bleedin.8%    - Score >=3 indicates high bleeding risk.  - HAS-BLED > CHADVAS indicates risk greater than benefit.  - Magnesium > 2, Potassium > 4  - stable however will monitor with telemetry for 24 hours given hypoglycemia and hypothermia.  - Synchronized cardioversion if hemodynamically unstable          Essential hypertension  Chronic, uncontrolled. Latest blood pressure and vitals reviewed-     Temp:  [93.2 °F (34 °C)-98.9 °F (37.2 °C)]   Pulse:  []   Resp:  [16-20]   BP: (113-181)/(53-94)   SpO2:  [96 %-100 %] .   Home meds for hypertension were reviewed and noted below.   Hypertension Medications               carvediloL (COREG) 6.25 MG tablet Take 1 tablet (6.25 mg total) by mouth 2 (two) times daily.    hydrALAZINE (APRESOLINE) 50 MG tablet Take 1 tablet (50 mg total) by mouth 3 (three) times daily.    isosorbide dinitrate (ISORDIL) 20 MG tablet Take 2 tablets (40 mg total) by mouth 3 (three) times daily.            While in the hospital, will manage blood  pressure as follows; Continue home antihypertensive regimen.  Will hold steady for tonight and adjust in morning if uncontrolled overnight.    Will utilize p.r.n. blood pressure medication only if patient's blood pressure greater than 160/100 and she develops symptoms such as worsening chest pain or shortness of breath.    Severe obesity (BMI 35.0-39.9) with comorbidity  Body mass index is 36.28 kg/m². Morbid obesity complicates all aspects of disease management from diagnostic modalities to treatment. Weight loss encouraged and health benefits explained to patient.     Encouraged patient to continue to monitor for pressure injury given her profound debility from bilateral AKA        Debility  Patient with Chronic debility due to bed confinement status, other reduced mobility, and bilateral above knee amputations . Latest AMPAC and GEMS scores have not been reviewed. Evaluation for etiology is complete. Plan includes fall precautions in place.    Chronic without change in mobility.  Reminded to monitor for pressure injuries.  Continues to remain mobile and active via wheelchair with minor assistance from .    S/P AKA (above knee amputation) bilateral  See debility      S/P CABG x 1  Continue clopidogrel.  Not on aspirin due to apixaban.      CAD (coronary artery disease)  Patient with known CAD s/p CABG, which is controlled Will continue  apixaban, Plavix, and Statin and monitor for S/Sx of angina/ACS. Continue to monitor on telemetry.       VTE Risk Mitigation (From admission, onward)           Ordered     apixaban tablet 5 mg  2 times daily         12/02/23 2251     IP VTE HIGH RISK PATIENT  Once         12/02/23 2251     Place sequential compression device  Until discontinued         12/02/23 2251     Reason for No Pharmacological VTE Prophylaxis  Once        Question:  Reasons:  Answer:  Already adequately anticoagulated on oral Anticoagulants    12/02/23 2251     Place sequential compression device   Until discontinued         12/02/23 2251                    Discharge Planning   DEVAN: 12/4/2023     Code Status: Full Code   Is the patient medically ready for discharge?: No    Reason for patient still in hospital (select all that apply): Patient trending condition, Treatment, and Consult recommendations  Discharge Plan A: Home, Home with family                  Tatiana Corbin MD  Department of Hospital Medicine   [facility] - Emergency Dept

## 2023-12-03 NOTE — ASSESSMENT & PLAN NOTE
Creatine stable for now. BMP reviewed- noted Estimated Creatinine Clearance: 17.2 mL/min (A) (based on SCr of 4.2 mg/dL (H)). according to latest data. Based on current GFR, CKD stage is end stage.  Monitor UOP and serial BMP and adjust therapy as needed. Renally dose meds. Avoid nephrotoxic medications and procedures.    Still produces urine approximately 5 to 6 times a day.  Furosemide was recently adjusted to 40 b.i.d. p.o.

## 2023-12-03 NOTE — ASSESSMENT & PLAN NOTE
Patient with Chronic debility due to bed confinement status, other reduced mobility, and bilateral above knee amputations . Latest AMPAC and GEMS scores have not been reviewed. Evaluation for etiology is complete. Plan includes fall precautions in place.    Chronic without change in mobility.  Reminded to monitor for pressure injuries.  Continues to remain mobile and active via wheelchair with minor assistance from .

## 2023-12-03 NOTE — ASSESSMENT & PLAN NOTE
Body mass index is 36.28 kg/m². Morbid obesity complicates all aspects of disease management from diagnostic modalities to treatment. Weight loss encouraged and health benefits explained to patient.     Encouraged patient to continue to monitor for pressure injury given her profound debility from bilateral AKA

## 2023-12-03 NOTE — HOSPITAL COURSE
Patient admitted for hypoglycemia (insulin use w/o eating) and hypothermia. No evidence of infection on presentation so antibiotics were not initiated. Patinet vital signs have been stable since admission.  is very concerned about patient's state of health.     Heart failure - Per , patient has been having difficulty sleeping and using many pillows at night. Patient denies changes in sleep patterns or difficult breathing. Previously, patient would have peripheral edema but since bilateral AKA that no longer occurs. Patient was referred to cardiology for HFrEF (15%), records show no-show in September 2023.  Cardiology consulted and patient discharged with outpatient follow-up.    Diabetes -  reports that patient will restrict her diet because of the limited insulin she has been prescribed. Reports that  shares his insulin with patient if needed. Has been referred to endocrinology in the past but it does not appear appointment was ever scheduled. Discussed in detail that insulin titration is done in the outpatient setting for the best results. As  has noted that patient does not eat much when in the hospital, regimen IP is often not adequate at home.  Endocrine consult and patient discharged with home basal and newly added sliding scale insulin.  Outpatient follow-up requested.

## 2023-12-03 NOTE — ASSESSMENT & PLAN NOTE
HFrEF 15%  Given lasix 40IV 12/3 for crackles on exam and reported orthopnea.   - cardiology consult   - telemetery  - strict I&Os  - fluid restriction  - goal net negative 1.5L  - continue agressive diuresis  - K>4, Mag>2    Patient is identified as having Combined Systolic and Diastolic heart failure that is Chronic. CHF is currently uncontrolled due to >3 pillow orthopnea and Rales/crackles on pulmonary exam. Latest ECHO performed and demonstrates- Results for orders placed during the hospital encounter of 05/01/23    Echo    Interpretation Summary  · The left ventricle is mildly enlarged with severely decreased systolic function. The estimated ejection fraction is 15%.  · There is severe left ventricular global hypokinesis.  · Normal right ventricular size with low normal right ventricular systolic function.  · Grade I left ventricular diastolic dysfunction.  · Biatrial enlargement.  · Mild-to-moderate mitral regurgitation.  · Severe tricuspid regurgitation.  · The estimated PA systolic pressure is 43 mmHg.  · Elevated central venous pressure (15 mmHg).  . Continue Beta Blocker and Furosemide and monitor clinical status closely. Monitor on telemetry. Patient is off CHF pathway.  Monitor strict Is&Os and daily weights.  Place on fluid restriction of 2 L. Continue to stress to patient importance of self efficacy and  on diet for CHF. Last BNP reviewed- and noted below   Recent Labs   Lab 12/03/23  1233   BNP 1,505*   .

## 2023-12-03 NOTE — NURSING
IMB notified that pt would like a psych consult 2nd to depression and a CM consult for out pt assistance.

## 2023-12-03 NOTE — ASSESSMENT & PLAN NOTE
Ms Connelly is an ESRD  patient admitted after hypoglycemic episode in the setting insulin administration per . She has HD MWF at [facility].    Lab Results   Component Value Date    CREATININE 4.2 (H) 12/03/2023       -BUN of 43, no sih=gne of uremia, co2 of 24  -k is 3.3  - No HD  needed today. Will reasses in AM

## 2023-12-03 NOTE — ASSESSMENT & PLAN NOTE
Currently stable.  Continue to monitor.  She is ESRD so dialysis would be easy however she does continue to make urine so furosemide would not be unreasonable if she were to develop hypoxia.

## 2023-12-03 NOTE — ASSESSMENT & PLAN NOTE
See hypothermia.  Glucoses on admission were 30 and self corrected following oral and IV glucose.  No antibiotics were given, no blood cultures were collected

## 2023-12-03 NOTE — ASSESSMENT & PLAN NOTE
Creatine stable for now. BMP reviewed- noted Estimated Creatinine Clearance: 19 mL/min (A) (based on SCr of 3.8 mg/dL (H)). according to latest data. Based on current GFR, CKD stage is end stage.  Monitor UOP and serial BMP and adjust therapy as needed. Renally dose meds. Avoid nephrotoxic medications and procedures.    Continue calcitriol

## 2023-12-03 NOTE — ASSESSMENT & PLAN NOTE
Patient with known CAD s/p CABG, which is controlled Will continue  apixaban, Plavix, and Statin and monitor for S/Sx of angina/ACS. Continue to monitor on telemetry.

## 2023-12-04 LAB
ACINETOBACTER CALCOACETICUS/BAUMANNII COMPLEX: NOT DETECTED
ALBUMIN SERPL BCP-MCNC: 2.6 G/DL (ref 3.5–5.2)
ALP SERPL-CCNC: 200 U/L (ref 55–135)
ALT SERPL W/O P-5'-P-CCNC: 10 U/L (ref 10–44)
ANION GAP SERPL CALC-SCNC: 8 MMOL/L (ref 8–16)
AST SERPL-CCNC: 16 U/L (ref 10–40)
BACTERIA UR CULT: ABNORMAL
BACTEROIDES FRAGILIS: NOT DETECTED
BASOPHILS # BLD AUTO: 0.05 K/UL (ref 0–0.2)
BASOPHILS NFR BLD: 1.1 % (ref 0–1.9)
BILIRUB SERPL-MCNC: 1 MG/DL (ref 0.1–1)
BUN SERPL-MCNC: 56 MG/DL (ref 6–20)
CALCIUM SERPL-MCNC: 8 MG/DL (ref 8.7–10.5)
CANDIDA ALBICANS: NOT DETECTED
CANDIDA AURIS: NOT DETECTED
CANDIDA GLABRATA: NOT DETECTED
CANDIDA KRUSEI: NOT DETECTED
CANDIDA PARAPSILOSIS: NOT DETECTED
CANDIDA TROPICALIS: NOT DETECTED
CHLORIDE SERPL-SCNC: 97 MMOL/L (ref 95–110)
CO2 SERPL-SCNC: 27 MMOL/L (ref 23–29)
CREAT SERPL-MCNC: 5.4 MG/DL (ref 0.5–1.4)
CRYPTOCOCCUS NEOFORMANS/GATTII: NOT DETECTED
CTX-M GENE: NORMAL
DIFFERENTIAL METHOD: ABNORMAL
ENTEROBACTER CLOACAE COMPLEX: NOT DETECTED
ENTEROBACTERALES: NOT DETECTED
ENTEROCOCCUS FAECALIS: NOT DETECTED
ENTEROCOCCUS FAECIUM: NOT DETECTED
EOSINOPHIL # BLD AUTO: 0.1 K/UL (ref 0–0.5)
EOSINOPHIL NFR BLD: 2.1 % (ref 0–8)
ERYTHROCYTE [DISTWIDTH] IN BLOOD BY AUTOMATED COUNT: 18 % (ref 11.5–14.5)
ESCHERICHIA COLI: NOT DETECTED
EST. GFR  (NO RACE VARIABLE): 8.7 ML/MIN/1.73 M^2
GLUCOSE SERPL-MCNC: 251 MG/DL (ref 70–110)
HAEMOPHILUS INFLUENZAE: NOT DETECTED
HCT VFR BLD AUTO: 33.1 % (ref 37–48.5)
HGB BLD-MCNC: 10.3 G/DL (ref 12–16)
IMM GRANULOCYTES # BLD AUTO: 0.01 K/UL (ref 0–0.04)
IMM GRANULOCYTES NFR BLD AUTO: 0.2 % (ref 0–0.5)
IMP GENE: NORMAL
KLEBSIELLA AEROGENES: NOT DETECTED
KLEBSIELLA OXYTOCA: NOT DETECTED
KLEBSIELLA PNEUMONIAE GROUP: NOT DETECTED
KPC: NORMAL
LISTERIA MONOCYTOGENES: NOT DETECTED
LYMPHOCYTES # BLD AUTO: 1.1 K/UL (ref 1–4.8)
LYMPHOCYTES NFR BLD: 23.7 % (ref 18–48)
MAGNESIUM SERPL-MCNC: 2.3 MG/DL (ref 1.6–2.6)
MCH RBC QN AUTO: 29.6 PG (ref 27–31)
MCHC RBC AUTO-ENTMCNC: 31.1 G/DL (ref 32–36)
MCR-1: NORMAL
MCV RBC AUTO: 95 FL (ref 82–98)
MEC A/C AND MREJ (MRSA): NORMAL
MEC A/C: NORMAL
MONOCYTES # BLD AUTO: 0.7 K/UL (ref 0.3–1)
MONOCYTES NFR BLD: 15.1 % (ref 4–15)
NDM GENE: NORMAL
NEISSERIA MENINGITIDIS: NOT DETECTED
NEUTROPHILS # BLD AUTO: 2.8 K/UL (ref 1.8–7.7)
NEUTROPHILS NFR BLD: 57.8 % (ref 38–73)
NRBC BLD-RTO: 0 /100 WBC
OXA-48-LIKE: NORMAL
PHOSPHATE SERPL-MCNC: 4.2 MG/DL (ref 2.7–4.5)
PLATELET # BLD AUTO: 187 K/UL (ref 150–450)
PMV BLD AUTO: 11.2 FL (ref 9.2–12.9)
POCT GLUCOSE: 169 MG/DL (ref 70–110)
POCT GLUCOSE: 183 MG/DL (ref 70–110)
POCT GLUCOSE: 185 MG/DL (ref 70–110)
POCT GLUCOSE: 189 MG/DL (ref 70–110)
POCT GLUCOSE: 200 MG/DL (ref 70–110)
POTASSIUM SERPL-SCNC: 3.9 MMOL/L (ref 3.5–5.1)
PROT SERPL-MCNC: 7.1 G/DL (ref 6–8.4)
PROTEUS SPECIES: NOT DETECTED
PSEUDOMONAS AERUGINOSA: NOT DETECTED
RBC # BLD AUTO: 3.48 M/UL (ref 4–5.4)
SALMONELLA SP: NOT DETECTED
SERRATIA MARCESCENS: NOT DETECTED
SODIUM SERPL-SCNC: 132 MMOL/L (ref 136–145)
STAPHYLOCOCCUS AUREUS: NOT DETECTED
STAPHYLOCOCCUS EPIDERMIDIS: NOT DETECTED
STAPHYLOCOCCUS LUGDUNESIS: NOT DETECTED
STAPHYLOCOCCUS SPECIES: NOT DETECTED
STENOTROPHOMONAS MALTOPHILIA: NOT DETECTED
STREPTOCOCCUS AGALACTIAE: NOT DETECTED
STREPTOCOCCUS PNEUMONIAE: NOT DETECTED
STREPTOCOCCUS PYOGENES: NOT DETECTED
STREPTOCOCCUS SPECIES: NOT DETECTED
VAN A/B: NORMAL
VIM GENE: NORMAL
WBC # BLD AUTO: 4.76 K/UL (ref 3.9–12.7)

## 2023-12-04 PROCEDURE — G0378 HOSPITAL OBSERVATION PER HR: HCPCS | Mod: HCNC

## 2023-12-04 PROCEDURE — 63600175 PHARM REV CODE 636 W HCPCS: Mod: HCNC | Performed by: INTERNAL MEDICINE

## 2023-12-04 PROCEDURE — 96365 THER/PROPH/DIAG IV INF INIT: CPT | Mod: 59

## 2023-12-04 PROCEDURE — 83735 ASSAY OF MAGNESIUM: CPT | Mod: HCNC | Performed by: STUDENT IN AN ORGANIZED HEALTH CARE EDUCATION/TRAINING PROGRAM

## 2023-12-04 PROCEDURE — 36415 COLL VENOUS BLD VENIPUNCTURE: CPT | Mod: HCNC | Performed by: STUDENT IN AN ORGANIZED HEALTH CARE EDUCATION/TRAINING PROGRAM

## 2023-12-04 PROCEDURE — 25000003 PHARM REV CODE 250: Mod: HCNC | Performed by: STUDENT IN AN ORGANIZED HEALTH CARE EDUCATION/TRAINING PROGRAM

## 2023-12-04 PROCEDURE — 90935 HEMODIALYSIS ONE EVALUATION: CPT | Mod: HCNC,,, | Performed by: NURSE PRACTITIONER

## 2023-12-04 PROCEDURE — 25000003 PHARM REV CODE 250: Mod: HCNC | Performed by: INTERNAL MEDICINE

## 2023-12-04 PROCEDURE — G0257 UNSCHED DIALYSIS ESRD PT HOS: HCPCS | Mod: HCNC

## 2023-12-04 PROCEDURE — 85025 COMPLETE CBC W/AUTO DIFF WBC: CPT | Mod: HCNC | Performed by: STUDENT IN AN ORGANIZED HEALTH CARE EDUCATION/TRAINING PROGRAM

## 2023-12-04 PROCEDURE — 96361 HYDRATE IV INFUSION ADD-ON: CPT

## 2023-12-04 PROCEDURE — 80100016 HC MAINTENANCE HEMODIALYSIS: Mod: HCNC

## 2023-12-04 PROCEDURE — 84100 ASSAY OF PHOSPHORUS: CPT | Mod: HCNC | Performed by: STUDENT IN AN ORGANIZED HEALTH CARE EDUCATION/TRAINING PROGRAM

## 2023-12-04 PROCEDURE — 63600175 PHARM REV CODE 636 W HCPCS: Mod: HCNC | Performed by: NURSE PRACTITIONER

## 2023-12-04 PROCEDURE — 96360 HYDRATION IV INFUSION INIT: CPT | Mod: 59

## 2023-12-04 PROCEDURE — 90935 PR HEMODIALYSIS, ONE EVALUATION: ICD-10-PCS | Mod: HCNC,,, | Performed by: NURSE PRACTITIONER

## 2023-12-04 PROCEDURE — 80053 COMPREHEN METABOLIC PANEL: CPT | Mod: HCNC | Performed by: STUDENT IN AN ORGANIZED HEALTH CARE EDUCATION/TRAINING PROGRAM

## 2023-12-04 RX ORDER — SODIUM CHLORIDE 9 MG/ML
INJECTION, SOLUTION INTRAVENOUS ONCE
Status: COMPLETED | OUTPATIENT
Start: 2023-12-04 | End: 2023-12-04

## 2023-12-04 RX ORDER — HEPARIN SODIUM 1000 [USP'U]/ML
1000 INJECTION, SOLUTION INTRAVENOUS; SUBCUTANEOUS
Status: DISPENSED | OUTPATIENT
Start: 2023-12-04 | End: 2023-12-05

## 2023-12-04 RX ADMIN — CLOPIDOGREL BISULFATE 75 MG: 75 TABLET ORAL at 11:12

## 2023-12-04 RX ADMIN — INSULIN DETEMIR 8 UNITS: 100 INJECTION, SOLUTION SUBCUTANEOUS at 08:12

## 2023-12-04 RX ADMIN — MUPIROCIN: 20 OINTMENT TOPICAL at 08:12

## 2023-12-04 RX ADMIN — CEFTRIAXONE 2 G: 2 INJECTION, POWDER, FOR SOLUTION INTRAMUSCULAR; INTRAVENOUS at 07:12

## 2023-12-04 RX ADMIN — HYDRALAZINE HYDROCHLORIDE 50 MG: 50 TABLET ORAL at 04:12

## 2023-12-04 RX ADMIN — HYDRALAZINE HYDROCHLORIDE 50 MG: 50 TABLET ORAL at 08:12

## 2023-12-04 RX ADMIN — CALCIUM ACETATE 1334 MG: 667 CAPSULE ORAL at 01:12

## 2023-12-04 RX ADMIN — CARVEDILOL 6.25 MG: 6.25 TABLET, FILM COATED ORAL at 08:12

## 2023-12-04 RX ADMIN — ISOSORBIDE DINITRATE 40 MG: 20 TABLET ORAL at 04:12

## 2023-12-04 RX ADMIN — CALCITRIOL CAPSULES 0.5 MCG 0.5 MCG: 0.5 CAPSULE ORAL at 11:12

## 2023-12-04 RX ADMIN — ISOSORBIDE DINITRATE 40 MG: 20 TABLET ORAL at 11:12

## 2023-12-04 RX ADMIN — APIXABAN 5 MG: 5 TABLET, FILM COATED ORAL at 08:12

## 2023-12-04 RX ADMIN — CALCIUM ACETATE 1334 MG: 667 CAPSULE ORAL at 08:12

## 2023-12-04 RX ADMIN — ATORVASTATIN CALCIUM 80 MG: 40 TABLET, FILM COATED ORAL at 11:12

## 2023-12-04 RX ADMIN — HEPARIN SODIUM 1000 UNITS: 1000 INJECTION, SOLUTION INTRAVENOUS; SUBCUTANEOUS at 10:12

## 2023-12-04 RX ADMIN — HYDRALAZINE HYDROCHLORIDE 50 MG: 50 TABLET ORAL at 09:12

## 2023-12-04 RX ADMIN — APIXABAN 5 MG: 5 TABLET, FILM COATED ORAL at 11:12

## 2023-12-04 RX ADMIN — MUPIROCIN: 20 OINTMENT TOPICAL at 11:12

## 2023-12-04 RX ADMIN — CALCIUM ACETATE 1334 MG: 667 CAPSULE ORAL at 04:12

## 2023-12-04 RX ADMIN — ISOSORBIDE DINITRATE 40 MG: 20 TABLET ORAL at 08:12

## 2023-12-04 RX ADMIN — Medication 6 MG: at 11:12

## 2023-12-04 RX ADMIN — SODIUM CHLORIDE: 9 INJECTION, SOLUTION INTRAVENOUS at 07:12

## 2023-12-04 RX ADMIN — CARVEDILOL 6.25 MG: 6.25 TABLET, FILM COATED ORAL at 11:12

## 2023-12-04 RX ADMIN — SERTRALINE HYDROCHLORIDE 50 MG: 50 TABLET ORAL at 11:12

## 2023-12-04 NOTE — ASSESSMENT & PLAN NOTE
History of HFrEF (15%), CABG 1/2020 and PCI x1 7/2020. She appears well compensated at this time without any symptoms of signs of volume overload. She is on GDMT of carvedilol 6.25mg BID and isosorbide dinitrate 20. She receives HD with volume removal. She is not established with an outpatient cardiologist.  - recommend establishing with outpatient general cardiologist

## 2023-12-04 NOTE — PLAN OF CARE
Problem: Device-Related Complication Risk (Hemodialysis)  Goal: Safe, Effective Therapy Delivery  Outcome: Ongoing, Progressing     Problem: Hemodynamic Instability (Hemodialysis)  Goal: Effective Tissue Perfusion  Outcome: Ongoing, Progressing     Problem: Infection (Hemodialysis)  Goal: Absence of Infection Signs and Symptoms  Outcome: Ongoing, Progressing       Dialysis tx ended to the left chest perm cath, heparin locked, capped, sterile dressing applied.    Net fluid removed: 2L    Report given to nurse devora Cota awaiting transport from [facility] to room 21846 by bed.

## 2023-12-04 NOTE — PROGRESS NOTES
[facility] HEMODIALYSIS NOTE     Patient currently on hemodialysis for removal of uremic toxins and volume.     Patient seen and evaluated on hemodialysis, tolerating treatment, see HD flowsheet for vitals and assessments.    Labs have been reviewed and the dialysate bath has been adjusted.       Assessment/Plan:    -Patient seen on HD, tolerating treatment well, w/o complaints   -UF goal of 2L  -Renal diet, if not NPO   -Strict I/O's and daily weights  -Daily renal function panels  -Keep MAP >65 while on HD   -Hgb goal 10-11, at goal   -continue calcium acetate TID WM  -continue sodium bicarb - bicarb bath with HD   -Will continue to follow while inpatient     Katie Monique DNP-FNP, C  Nephrology  Pager: [phone]

## 2023-12-04 NOTE — PROGRESS NOTES
Nutrition-Related Diabetes Education      Time Spent: 10 min    Learners: Pt    Current HbA1c: 10.5    Is patient aware of their A1c and their goal A1c? Yes    Home diabetes medication(s): Levemir 8 units at evening per chart    Nutrition Education with handouts: Educated pt on CHO counting for people with diabetes. Pt verbalized understanding. Emphasized the importance of diet adherence. Pt with no additional questions. No other needs identified. Left all education material with pt at bedside.    Comments: Pt reports decreased appetite recently. Wt stable. Pt appears nourished. No indicators of malnutrition.    Barriers to Learning: No    Follow up: Yes    Please consult as needed.  Thank you!

## 2023-12-04 NOTE — ASSESSMENT & PLAN NOTE
HX CABG and PCI x1. Not having chest pain on presentation  - control contributing factors: HTN, HLD, DM2  - EKG if chest pain develops  - continue plavix, statin

## 2023-12-04 NOTE — HPI
Suyapa Connelly is a 57yo female with HFrEF (15%), CAD s/p CABG 2020 and PCI to mid circumflex 2021, paroxysmal afib (on Eliquis), ESRD on dialysis, b/l AKAs, and DM2 (A1c 10.5, on insulin) who presented from dialysis with hypothermia and hypoglycemia. Patient reporting that she feels 100% today on evaluation. She denies SOB, orthopnea, CP, increased edema in residual limbs. She reports that she does not have a cardiologist that she sees in the clinic. Patient has been hypertensive to 175/94, labs notable for BNP 1505, trop 0.037, A1c 10.5. EKG with NSR and repeat with sinus tachycardia and non-specific T wave abnormalities. CXR with cardiomegaly with vascular congestion and edema similar to prior. Previous echo 5/2023 with EF 15%. Cardiology consulted for HFrEF.

## 2023-12-04 NOTE — CONSULTS
[facility] ([place] [id number])  Cardiology  Consult Note    Patient Name: Suyapa Connelly  MRN: [id number]  Admission Date: 12/2/2023  Hospital Length of Stay: 0 days  Code Status: Full Code   Attending Provider: Rafat Lange MD   Consulting Provider: Glendy Shaikh MD  Primary Care Physician: Magen Christensen MD  Principal Problem:Hypothermia    Patient information was obtained from patient, past medical records, and ER records.     Inpatient consult to Cardiology  Consult performed by: Glendy Shaikh MD  Consult ordered by: Rafat Lange MD        Subjective:     Chief Complaint:  HFrEF     HPI:   Suyapa Connelly is a 57yo female with HFrEF (15%), CAD s/p CABG 2020 and PCI to mid circumflex 2021, paroxysmal afib (on Eliquis), ESRD on dialysis, b/l AKAs, and DM2 (A1c 10.5, on insulin) who presented from dialysis with hypothermia and hypoglycemia. Patient reporting that she feels 100% today on evaluation. She denies SOB, orthopnea, CP, increased edema in residual limbs. She reports that she does not have a cardiologist that she sees in the clinic. Patient has been hypertensive to 175/94, labs notable for BNP 1505, trop 0.037, A1c 10.5. EKG with NSR and repeat with sinus tachycardia and non-specific T wave abnormalities. CXR with cardiomegaly with vascular congestion and edema similar to prior. Previous echo 5/2023 with EF 15%. Cardiology consulted for HFrEF.    Past Medical History:   Diagnosis Date    Anxiety     Chronic pain syndrome     CKD (chronic kidney disease), stage III     Depression     Diabetes mellitus     type 2    Diabetes mellitus, type 2     GERD (gastroesophageal reflux disease)     Hyperemesis 3/23/2021    Hypokalemia 3/23/2021    Infection of below knee amputation stump 3/12/2022    Osteomyelitis     Osteomyelitis of left foot 4/30/2021    Ulcer of left foot     Vaginal delivery     x1       Past Surgical History:   Procedure Laterality Date    ABOVE-KNEE AMPUTATION  Left 5/18/2021    Procedure: AMPUTATION, ABOVE KNEE;  Surgeon: Teddy Huber MD;  Location: [facility] OR [address] [place] [address];  Service: Vascular;  Laterality: Left;    ABOVE-KNEE AMPUTATION Right 3/18/2022    Procedure: AMPUTATION, ABOVE KNEE;  Surgeon: DAYNE Florez II, MD;  [facility];  Service: Vascular;  Laterality: Right;    Angiogram - Right Extremity Right 7/9/15    angiogram-left leg  10/6/15    ANGIOGRAPHY OF LOWER EXTREMITY Left 4/29/2021    Procedure: ANGIOGRAM, LOWER EXTREMITY;  Surgeon: Teddy Huber MD;  Location: [facility] OR [address] [place] [address];  Service: Vascular;  Laterality: Left;    BELOW KNEE AMPUTATION OF LOWER EXTREMITY Right 12/28/2021    Procedure: AMPUTATION, BELOW KNEE;  Surgeon: Kaitlyn Rojas MD;  Location: [facility] OR;  Service: General;  Laterality: Right;    CATHETERIZATION OF BOTH LEFT AND RIGHT HEART N/A 12/18/2019    Procedure: CATHETERIZATION, HEART, BOTH LEFT AND RIGHT;  Surgeon: Que Fernando III, MD;  Location: [facility] CATH LAB;  Service: Cardiology;  Laterality: N/A;    CORONARY ANGIOGRAPHY N/A 12/18/2019    Procedure: ANGIOGRAM, CORONARY ARTERY;  Surgeon: Que Fernando III, MD;  Location: [facility] CATH LAB;  Service: Cardiology;  Laterality: N/A;    CORONARY ANGIOGRAPHY INCLUDING BYPASS GRAFTS WITH CATHETERIZATION OF LEFT HEART N/A 7/28/2020    Procedure: ANGIOGRAM, CORONARY, INCLUDING BYPASS GRAFT, WITH LEFT HEART CATHETERIZATION, 9 am;  Surgeon: Rachel Easley MD;  Location: [facility] CATH LAB;  Service: Cardiology;  Laterality: N/A;    CORONARY ARTERY BYPASS GRAFT (CABG) N/A 1/14/2020    Procedure: CORONARY ARTERY BYPASS GRAFT (CABG) x 1     Off Pump;  Surgeon: Huang Altamirano MD;  Location: [address];  Service: Cardiovascular;  Laterality: N/A;    CREATION OF FEMORAL-TIBIAL ARTERY BYPASS Left 4/29/2021    Procedure: CREATION, BYPASS, ARTERIAL, FEMORAL TO ANTERIOR TIBIAL;  Surgeon: Teddy Huber MD;  Location: [facility] OR [address] [place] [address];  Service: Vascular;   Laterality: Left;    CREATION OF FEMOROPOPLITEAL ARTERIAL BYPASS USING GRAFT Left 8/18/2020    Procedure: CREATION, BYPASS, ARTERIAL, FEMORAL TO POPLITEAL, USING GRAFT, LEFT LOWER EXTREMITY;  Surgeon: Teddy Huber MD;  Location: [facility] OR;  Service: Vascular;  Laterality: Left;  REQUEST 7:15 A.M. START----COVID NEGATIVE ON 8/17  1ST CASE STARTE PER LEANA ON 8/7/2020 @ 942AM-  RN PREOP 8/12/2020   T/S-----CLEARED BY CARDS-------PENDING INSURANCE    DEBRIDEMENT OF FOOT Left 9/8/2020    Procedure: DEBRIDEMENT, FOOT;  Surgeon: Rosio Mayes DPM;  Location: [facility] OR;  Service: Podiatry;  Laterality: Left;  request neoxx .   RN Pre Op 9-4-2020, Covid negative on 9/5/20. C A    DEBRIDEMENT OF FOOT  3/4/2021    Procedure: DEBRIDEMENT, FOOT;  Surgeon: Teddy Huber MD;  Location: [facility] OR;  Service: Vascular;;    DEBRIDEMENT OF FOOT Left 3/9/2021    Procedure: DEBRIDEMENT, FOOT, bone biopsy;  Surgeon: Rosio Mayes DPM;  Location: [facility] OR;  Service: Podiatry;  Laterality: Left;  Request neoxx---COVID IN AM  REQUESTING NOON START  RN Phone Pre op.On Blood thinners Plavix and Eliquis.  Covid am of surgery. C A    DEBRIDEMENT OF FOOT Left 5/4/2021    Procedure: DEBRIDEMENT, FOOT;  Surgeon: Farooq Morley DPM;  Location: [address];  Service: Podiatry;  Laterality: Left;    INSERTION OF TUNNELED CENTRAL VENOUS HEMODIALYSIS CATHETER N/A 1/27/2020    Procedure: Insertion, Catheter, Central Venous, Hemodialysis;  Surgeon: ESTEBAN Gomez III, MD;  Location: [facility] CATH LAB;  Service: Peripheral Vascular;  Laterality: N/A;    INSERTION OF TUNNELED CENTRAL VENOUS HEMODIALYSIS CATHETER  5/10/2023    Procedure: Insertion, Catheter, Central Venous, Hemodialysis;  Surgeon: Romulo Queen MD;  Location: [facility] CATH LAB;  Service: Cardiology;;    PERCUTANEOUS TRANSLUMINAL ANGIOPLASTY N/A 3/4/2021    Procedure: PTA (ANGIOPLASTY, PERCUTANEOUS, TRANSLUMINAL);  Surgeon: Teddy Huber MD;  Location: [facility] OR;   Service: Vascular;  Laterality: N/A;    REMOVAL OF ARTERIOVENOUS GRAFT Left 5/27/2021    Procedure: REMOVAL, GRAFT, LEFT LOWER EXTREMITY, WOUND EXPLORATION;  Surgeon: Teddy Huber MD;  Location: [facility] OR 2ND FLR;  Service: Vascular;  Laterality: Left;    REMOVAL OF NAIL OF DIGIT Left 3/9/2021    Procedure: REMOVAL, NAIL, DIGIT;  Surgeon: Rosio Mayes DPM;  Location: [facility] OR;  Service: Podiatry;  Laterality: Left;    RIGHT HEART CATHETERIZATION Right 5/10/2023    Procedure: INSERTION, CATHETER, RIGHT HEART;  Surgeon: Romulo Queen MD;  Location: [facility] CATH LAB;  Service: Cardiology;  Laterality: Right;    THROMBECTOMY Left 3/4/2021    Procedure: THROMBECTOMY, LEFT LOWER EXTREMITY BYPASS GRAFT, ANGIOGRAM, POSSIBLE INTERVENTION, POSSIBLE LEFT LOWER EXTREMITY BYPASS;  Surgeon: Teddy Huber MD;  Location: [facility] OR;  Service: Vascular;  Laterality: Left;    THROMBECTOMY Left 4/29/2021    Procedure: GRAFT THROMBECTOMY, LEFT LOWER EXTREMITY;  Surgeon: Teddy Huber MD;  [facility];  Service: Vascular;  Laterality: Left;  14.5 min  1179.85 mGy  341.01 Gycm2  240 ml dye    THROMBECTOMY  10/22/2021    Procedure: THROMBECTOMY;  Surgeon: Saad Arenas MD;  Location: [facility] CATH LAB/EP;  Service: Cardiology;;       Review of patient's allergies indicates:   Allergen Reactions    Ciprofloxacin Itching    Contrast media      Kidney injury    Iodine      Kidney injury       No current facility-administered medications on file prior to encounter.     Current Outpatient Medications on File Prior to Encounter   Medication Sig    allopurinoL (ZYLOPRIM) 100 MG tablet Take 0.5 tablets (50 mg total) by mouth every other day.    apixaban (ELIQUIS) 5 mg Tab Take 1 tablet (5 mg total) by mouth 2 (two) times daily.    atorvastatin (LIPITOR) 80 MG tablet Take 1 tablet (80 mg total) by mouth once daily.    calcitRIOL (ROCALTROL) 0.5 MCG Cap Take 1 capsule (0.5 mcg total) by mouth once daily.    calcium  "acetate,phosphat bind, (PHOSLO) 667 mg capsule Take 1 capsule (667 mg total) by mouth 3 (three) times daily with meals. (Patient taking differently: Take 1,334 mg by mouth 3 (three) times daily with meals.)    carvediloL (COREG) 6.25 MG tablet Take 1 tablet (6.25 mg total) by mouth 2 (two) times daily.    clopidogreL (PLAVIX) 75 mg tablet Take 1 tablet (75 mg total) by mouth once daily.    hydrALAZINE (APRESOLINE) 50 MG tablet Take 1 tablet (50 mg total) by mouth 3 (three) times daily.    insulin detemir U-100, Levemir, 100 unit/mL (3 mL) SubQ InPn pen Inject 8 Units into the skin every evening.    isosorbide dinitrate (ISORDIL) 20 MG tablet Take 2 tablets (40 mg total) by mouth 3 (three) times daily.    pen needle, diabetic 32 gauge x 5/32" Ndle Use to inject insulin once daily    sertraline (ZOLOFT) 50 MG tablet Take 1 tablet (50 mg total) by mouth once daily.    sodium bicarbonate 650 MG tablet Take 650 mg by mouth 3 (three) times daily.    [DISCONTINUED] lancing device Misc 1 Device by Misc.(Non-Drug; Combo Route) route 2 (two) times daily with meals.     Family History       Problem Relation (Age of Onset)    Diabetes Mother, Father, Paternal Grandmother    Heart disease Maternal Grandmother    No Known Problems Maternal Grandfather, Paternal Grandfather          Tobacco Use    Smoking status: Former    Smokeless tobacco: Never   Substance and Sexual Activity    Alcohol use: No    Drug use: Yes     Types: Marijuana     Comment: occassional    Sexual activity: Yes     Partners: Male     Review of Systems   Constitutional: Negative for chills and fever.   HENT:  Negative for sore throat.    Eyes:  Negative for blurred vision.   Cardiovascular:  Negative for chest pain, dyspnea on exertion, orthopnea and palpitations.   Respiratory:  Negative for shortness of breath.    Hematologic/Lymphatic: Negative for bleeding problem.   Skin:  Negative for rash.   Gastrointestinal:  Negative for abdominal pain. "   Neurological:  Negative for headaches.   Psychiatric/Behavioral:  Negative for altered mental status.      Objective:     Vital Signs (Most Recent):  Temp: 98.4 °F (36.9 °C) (12/04/23 1602)  Pulse: 93 (12/04/23 1602)  Resp: 18 (12/04/23 1602)  BP: 126/64 (12/04/23 1602)  SpO2: 96 % (12/04/23 1602) Vital Signs (24h Range):  Temp:  [96.9 °F (36.1 °C)-98.8 °F (37.1 °C)] 98.4 °F (36.9 °C)  Pulse:  [90-99] 93  Resp:  [16-18] 18  SpO2:  [95 %-97 %] 96 %  BP: (115-175)/(58-94) 126/64     Weight: 98.9 kg (218 lb)  Body mass index is 36.28 kg/m².    SpO2: 96 %         Intake/Output Summary (Last 24 hours) at 12/4/2023 1631  Last data filed at 12/4/2023 1350  Gross per 24 hour   Intake 1200 ml   Output 2804 ml   Net -1604 ml       Lines/Drains/Airways       Central Venous Catheter Line  Duration                  Hemodialysis Catheter 05/25/23 1336 left internal jugular 193 days              Peripheral Intravenous Line  Duration                  Peripheral IV - Single Lumen 12/04/23 1203 20 G;1 3/4 in Anterior;Right Forearm <1 day                     Physical Exam  Vitals and nursing note reviewed.   Constitutional:       General: She is not in acute distress.     Appearance: She is not toxic-appearing.   HENT:      Head: Normocephalic and atraumatic.      Right Ear: External ear normal.      Left Ear: External ear normal.      Nose: Nose normal.      Mouth/Throat:      Mouth: Mucous membranes are moist.   Eyes:      General: No scleral icterus.  Cardiovascular:      Rate and Rhythm: Normal rate and regular rhythm.      Heart sounds: No murmur heard.  Pulmonary:      Effort: Pulmonary effort is normal. No respiratory distress.      Breath sounds: No wheezing, rhonchi or rales.   Abdominal:      General: Abdomen is flat. There is no distension.      Palpations: Abdomen is soft.   Musculoskeletal:      Cervical back: Normal range of motion.      Comments: Bilateral AKA with well healed scars   Skin:     General: Skin is warm.       Findings: No rash.   Neurological:      Mental Status: She is alert. Mental status is at baseline.          Significant Labs: CMP   Recent Labs   Lab 12/02/23  1723 12/03/23  0403 12/03/23  1740 12/04/23  0547   * 136 136 132*   K 3.9 3.3* 4.1 3.9   CL 97 97 97 97   CO2 25 24 24 27   * 246* 333* 251*   BUN 36* 43* 46* 56*   CREATININE 3.8* 4.2* 5.4* 5.4*   CALCIUM 8.1* 7.8* 7.7* 8.0*   PROT 8.0 7.1  --  7.1   ALBUMIN 2.8* 2.4*  --  2.6*   BILITOT 1.6* 1.1*  --  1.0   ALKPHOS 221* 192*  --  200*   AST 23 19  --  16   ALT 11 9*  --  10   ANIONGAP 13 15 15 8   , CBC   Recent Labs   Lab 12/02/23  1723 12/03/23  0302 12/04/23  0547   WBC 5.17 5.12 4.76   HGB 11.9* 10.9* 10.3*   HCT 37.5 33.5* 33.1*    151 187   , and Troponin   Recent Labs   Lab 12/02/23  2130   TROPONINI 0.037*       Significant Imaging:       CXR   Cardiomegaly with vascular congestion and edema similar to prior      Echo    Result Date: 5/2/2023  · The left ventricle is mildly enlarged with severely decreased systolic   function. The estimated ejection fraction is 15%.  · There is severe left ventricular global hypokinesis.  · Normal right ventricular size with low normal right ventricular systolic   function.  · Grade I left ventricular diastolic dysfunction.  · Biatrial enlargement.  · Mild-to-moderate mitral regurgitation.  · Severe tricuspid regurgitation.  · The estimated PA systolic pressure is 43 mmHg.  · Elevated central venous pressure (15 mmHg).         Assessment and Plan:     Chronic combined systolic and diastolic heart failure  History of HFrEF (15%), CABG 1/2020 and PCI x1 7/2020. She appears well compensated at this time without any symptoms of signs of volume overload. She is on GDMT of carvedilol 6.25mg BID and isosorbide dinitrate 20. She receives HD with volume removal. She is not established with an outpatient cardiologist.  - recommend establishing with outpatient general cardiologist    Paroxysmal atrial  fibrillation  CHADsVASC 5. Not in atrial fibrillation since admission.  - continue eliquis    CAD (coronary artery disease)  HX CABG and PCI x1. Not having chest pain on presentation  - control contributing factors: HTN, HLD, DM2  - EKG if chest pain develops  - continue plavix, statin        VTE Risk Mitigation (From admission, onward)           Ordered     heparin (porcine) injection 1,000 Units  As needed (PRN)         12/04/23 1016     apixaban tablet 5 mg  2 times daily         12/02/23 2251     IP VTE HIGH RISK PATIENT  Once         12/02/23 2251     Place sequential compression device  Until discontinued         12/02/23 2251     Reason for No Pharmacological VTE Prophylaxis  Once        Question:  Reasons:  Answer:  Already adequately anticoagulated on oral Anticoagulants    12/02/23 2251                    Thank you for your consult. I will sign off. Please contact us if you have any additional questions.    Glendy Shaikh MD  Cardiology   [facility] - Intensive Care ([place])

## 2023-12-04 NOTE — PLAN OF CARE
Problem: Skin Injury Risk Increased  Goal: Skin Health and Integrity  Outcome: Ongoing, Progressing     Problem: Infection  Goal: Absence of Infection Signs and Symptoms  Outcome: Ongoing, Progressing     Problem: Adult Inpatient Plan of Care  Goal: Plan of Care Review  Outcome: Ongoing, Progressing     Problem: Device-Related Complication Risk (Hemodialysis)  Goal: Safe, Effective Therapy Delivery  Outcome: Ongoing, Progressing     Problem: Diabetes Comorbidity  Goal: Blood Glucose Level Within Targeted Range  Outcome: Ongoing, Progressing     Problem: Fall Injury Risk  Goal: Absence of Fall and Fall-Related Injury  Outcome: Ongoing, Progressing   Patient AAOX3 no acute distress noted VSS RA denies any complaint of pain or discomforts patient repositioned throughout this shift no injuries noted. safety measures maintained

## 2023-12-04 NOTE — SUBJECTIVE & OBJECTIVE
Past Medical History:   Diagnosis Date    Anxiety     Chronic pain syndrome     CKD (chronic kidney disease), stage III     Depression     Diabetes mellitus     type 2    Diabetes mellitus, type 2     GERD (gastroesophageal reflux disease)     Hyperemesis 3/23/2021    Hypokalemia 3/23/2021    Infection of below knee amputation stump 3/12/2022    Osteomyelitis     Osteomyelitis of left foot 4/30/2021    Ulcer of left foot     Vaginal delivery     x1       Past Surgical History:   Procedure Laterality Date    ABOVE-KNEE AMPUTATION Left 5/18/2021    Procedure: AMPUTATION, ABOVE KNEE;  Surgeon: Teddy Huber MD;  Location: [facility] OR [address] [place] [address];  Service: Vascular;  Laterality: Left;    ABOVE-KNEE AMPUTATION Right 3/18/2022    Procedure: AMPUTATION, ABOVE KNEE;  Surgeon: DAYNE Florez II, MD;  Location: [facility] OR [address] [place] [address];  Service: Vascular;  Laterality: Right;    Angiogram - Right Extremity Right 7/9/15    angiogram-left leg  10/6/15    ANGIOGRAPHY OF LOWER EXTREMITY Left 4/29/2021    Procedure: ANGIOGRAM, LOWER EXTREMITY;  Surgeon: Teddy Huber MD;  Location: [facility] OR [address] [place] [address];  Service: Vascular;  Laterality: Left;    BELOW KNEE AMPUTATION OF LOWER EXTREMITY Right 12/28/2021    Procedure: AMPUTATION, BELOW KNEE;  Surgeon: Kaitlyn Rojas MD;  Location: [facility] OR;  Service: General;  Laterality: Right;    CATHETERIZATION OF BOTH LEFT AND RIGHT HEART N/A 12/18/2019    Procedure: CATHETERIZATION, HEART, BOTH LEFT AND RIGHT;  Surgeon: Que Fernando III, MD;  Location: [facility] CATH LAB;  Service: Cardiology;  Laterality: N/A;    CORONARY ANGIOGRAPHY N/A 12/18/2019    Procedure: ANGIOGRAM, CORONARY ARTERY;  Surgeon: Que Fernando III, MD;  Location: [facility] CATH LAB;  Service: Cardiology;  Laterality: N/A;    CORONARY ANGIOGRAPHY INCLUDING BYPASS GRAFTS WITH CATHETERIZATION OF LEFT HEART N/A 7/28/2020    Procedure: ANGIOGRAM, CORONARY, INCLUDING BYPASS GRAFT, WITH LEFT HEART CATHETERIZATION, 9 am;  Surgeon:  Rachel Easley MD;  Location: [facility] CATH LAB;  Service: Cardiology;  Laterality: N/A;    CORONARY ARTERY BYPASS GRAFT (CABG) N/A 1/14/2020    Procedure: CORONARY ARTERY BYPASS GRAFT (CABG) x 1     Off Pump;  Surgeon: Huang Altamirano MD;  Location: [facility] OR [address] [place] [address];  Service: Cardiovascular;  Laterality: N/A;    CREATION OF FEMORAL-TIBIAL ARTERY BYPASS Left 4/29/2021    Procedure: CREATION, BYPASS, ARTERIAL, FEMORAL TO ANTERIOR TIBIAL;  Surgeon: Teddy Huber MD;  [facility];  Service: Vascular;  Laterality: Left;    CREATION OF FEMOROPOPLITEAL ARTERIAL BYPASS USING GRAFT Left 8/18/2020    Procedure: CREATION, BYPASS, ARTERIAL, FEMORAL TO POPLITEAL, USING GRAFT, LEFT LOWER EXTREMITY;  Surgeon: Teddy Huber MD;  Location: [facility];  Service: Vascular;  Laterality: Left;  REQUEST 7:15 A.M. START----COVID NEGATIVE ON 8/17 1ST CASE STARTKENYA DUEÑAS ON 8/7/2020 @ 942AM-  RN PREOP 8/12/2020   T/S-----CLEARED BY CARDS-------PENDING INSURANCE    DEBRIDEMENT OF FOOT Left 9/8/2020    Procedure: DEBRIDEMENT, FOOT;  Surgeon: Rosio Mayes DPM;  Location: [facility];  Service: Podiatry;  Laterality: Left;  request neoxx .   RN Pre Op 9-4-2020, Covid negative on 9/5/20. C A    DEBRIDEMENT OF FOOT  3/4/2021    Procedure: DEBRIDEMENT, FOOT;  Surgeon: Teddy Huber MD;  Location: [facility] OR;  Service: Vascular;;    DEBRIDEMENT OF FOOT Left 3/9/2021    Procedure: DEBRIDEMENT, FOOT, bone biopsy;  Surgeon: Rosio Mayes DPM;  Location: [facility] OR;  Service: Podiatry;  Laterality: Left;  Request neoxx---COVID IN AM  REQUESTING NOON START  RN Phone Pre op.On Blood thinners Plavix and Eliquis.  Covid am of surgery. C A    DEBRIDEMENT OF FOOT Left 5/4/2021    Procedure: DEBRIDEMENT, FOOT;  Surgeon: Farooq Morley DPM;  [address];  Service: Podiatry;  Laterality: Left;    INSERTION OF TUNNELED CENTRAL VENOUS HEMODIALYSIS CATHETER N/A 1/27/2020    Procedure: Insertion, Catheter,  Central Venous, Hemodialysis;  Surgeon: ESTEBAN Gomez III, MD;  Location: [facility] CATH LAB;  Service: Peripheral Vascular;  Laterality: N/A;    INSERTION OF TUNNELED CENTRAL VENOUS HEMODIALYSIS CATHETER  5/10/2023    Procedure: Insertion, Catheter, Central Venous, Hemodialysis;  Surgeon: Romulo Queen MD;  Location: [facility] CATH LAB;  Service: Cardiology;;    PERCUTANEOUS TRANSLUMINAL ANGIOPLASTY N/A 3/4/2021    Procedure: PTA (ANGIOPLASTY, PERCUTANEOUS, TRANSLUMINAL);  Surgeon: Teddy Huber MD;  Location: [facility] OR;  Service: Vascular;  Laterality: N/A;    REMOVAL OF ARTERIOVENOUS GRAFT Left 5/27/2021    Procedure: REMOVAL, GRAFT, LEFT LOWER EXTREMITY, WOUND EXPLORATION;  Surgeon: Teddy Huber MD;  Location: [facility] 2ND FLR;  Service: Vascular;  Laterality: Left;    REMOVAL OF NAIL OF DIGIT Left 3/9/2021    Procedure: REMOVAL, NAIL, DIGIT;  Surgeon: Rosio Mayes DPM;  Location: [facility] OR;  Service: Podiatry;  Laterality: Left;    RIGHT HEART CATHETERIZATION Right 5/10/2023    Procedure: INSERTION, CATHETER, RIGHT HEART;  Surgeon: Romulo Queen MD;  Location: [facility] CATH LAB;  Service: Cardiology;  Laterality: Right;    THROMBECTOMY Left 3/4/2021    Procedure: THROMBECTOMY, LEFT LOWER EXTREMITY BYPASS GRAFT, ANGIOGRAM, POSSIBLE INTERVENTION, POSSIBLE LEFT LOWER EXTREMITY BYPASS;  Surgeon: Teddy Huber MD;  Location: [facility] OR;  Service: Vascular;  Laterality: Left;    THROMBECTOMY Left 4/29/2021    Procedure: GRAFT THROMBECTOMY, LEFT LOWER EXTREMITY;  Surgeon: Teddy Huber MD;  Location: [facility] OR 2ND FLR;  Service: Vascular;  Laterality: Left;  14.5 min  1179.85 mGy  341.01 Gycm2  240 ml dye    THROMBECTOMY  10/22/2021    Procedure: THROMBECTOMY;  Surgeon: Saad Arenas MD;  Location: [facility] CATH LAB/EP;  Service: Cardiology;;       Review of patient's allergies indicates:   Allergen Reactions    Ciprofloxacin Itching    Contrast media      Kidney injury    Iodine      Kidney injury  "      No current facility-administered medications on file prior to encounter.     Current Outpatient Medications on File Prior to Encounter   Medication Sig    allopurinoL (ZYLOPRIM) 100 MG tablet Take 0.5 tablets (50 mg total) by mouth every other day.    apixaban (ELIQUIS) 5 mg Tab Take 1 tablet (5 mg total) by mouth 2 (two) times daily.    atorvastatin (LIPITOR) 80 MG tablet Take 1 tablet (80 mg total) by mouth once daily.    calcitRIOL (ROCALTROL) 0.5 MCG Cap Take 1 capsule (0.5 mcg total) by mouth once daily.    calcium acetate,phosphat bind, (PHOSLO) 667 mg capsule Take 1 capsule (667 mg total) by mouth 3 (three) times daily with meals. (Patient taking differently: Take 1,334 mg by mouth 3 (three) times daily with meals.)    carvediloL (COREG) 6.25 MG tablet Take 1 tablet (6.25 mg total) by mouth 2 (two) times daily.    clopidogreL (PLAVIX) 75 mg tablet Take 1 tablet (75 mg total) by mouth once daily.    hydrALAZINE (APRESOLINE) 50 MG tablet Take 1 tablet (50 mg total) by mouth 3 (three) times daily.    insulin detemir U-100, Levemir, 100 unit/mL (3 mL) SubQ InPn pen Inject 8 Units into the skin every evening.    isosorbide dinitrate (ISORDIL) 20 MG tablet Take 2 tablets (40 mg total) by mouth 3 (three) times daily.    pen needle, diabetic 32 gauge x 5/32" Ndle Use to inject insulin once daily    sertraline (ZOLOFT) 50 MG tablet Take 1 tablet (50 mg total) by mouth once daily.    sodium bicarbonate 650 MG tablet Take 650 mg by mouth 3 (three) times daily.    [DISCONTINUED] lancing device Misc 1 Device by Misc.(Non-Drug; Combo Route) route 2 (two) times daily with meals.     Family History       Problem Relation (Age of Onset)    Diabetes Mother, Father, Paternal Grandmother    Heart disease Maternal Grandmother    No Known Problems Maternal Grandfather, Paternal Grandfather          Tobacco Use    Smoking status: Former    Smokeless tobacco: Never   Substance and Sexual Activity    Alcohol use: No    Drug " use: Yes     Types: Marijuana     Comment: occassional    Sexual activity: Yes     Partners: Male     Review of Systems   Constitutional: Negative for chills and fever.   HENT:  Negative for sore throat.    Eyes:  Negative for blurred vision.   Cardiovascular:  Negative for chest pain, dyspnea on exertion, orthopnea and palpitations.   Respiratory:  Negative for shortness of breath.    Hematologic/Lymphatic: Negative for bleeding problem.   Skin:  Negative for rash.   Gastrointestinal:  Negative for abdominal pain.   Neurological:  Negative for headaches.   Psychiatric/Behavioral:  Negative for altered mental status.      Objective:     Vital Signs (Most Recent):  Temp: 98.4 °F (36.9 °C) (12/04/23 1602)  Pulse: 93 (12/04/23 1602)  Resp: 18 (12/04/23 1602)  BP: 126/64 (12/04/23 1602)  SpO2: 96 % (12/04/23 1602) Vital Signs (24h Range):  Temp:  [96.9 °F (36.1 °C)-98.8 °F (37.1 °C)] 98.4 °F (36.9 °C)  Pulse:  [90-99] 93  Resp:  [16-18] 18  SpO2:  [95 %-97 %] 96 %  BP: (115-175)/(58-94) 126/64     Weight: 98.9 kg (218 lb)  Body mass index is 36.28 kg/m².    SpO2: 96 %         Intake/Output Summary (Last 24 hours) at 12/4/2023 1631  Last data filed at 12/4/2023 1350  Gross per 24 hour   Intake 1200 ml   Output 2804 ml   Net -1604 ml       Lines/Drains/Airways       Central Venous Catheter Line  Duration                  Hemodialysis Catheter 05/25/23 1336 left internal jugular 193 days              Peripheral Intravenous Line  Duration                  Peripheral IV - Single Lumen 12/04/23 1203 20 G;1 3/4 in Anterior;Right Forearm <1 day                     Physical Exam  Vitals and nursing note reviewed.   Constitutional:       General: She is not in acute distress.     Appearance: She is not toxic-appearing.   HENT:      Head: Normocephalic and atraumatic.      Right Ear: External ear normal.      Left Ear: External ear normal.      Nose: Nose normal.      Mouth/Throat:      Mouth: Mucous membranes are moist.   Eyes:       General: No scleral icterus.  Cardiovascular:      Rate and Rhythm: Normal rate and regular rhythm.      Heart sounds: No murmur heard.  Pulmonary:      Effort: Pulmonary effort is normal. No respiratory distress.      Breath sounds: No wheezing, rhonchi or rales.   Abdominal:      General: Abdomen is flat. There is no distension.      Palpations: Abdomen is soft.   Musculoskeletal:      Cervical back: Normal range of motion.      Comments: Bilateral AKA with well healed scars   Skin:     General: Skin is warm.      Findings: No rash.   Neurological:      Mental Status: She is alert. Mental status is at baseline.          Significant Labs: CMP   Recent Labs   Lab 12/02/23  1723 12/03/23  0403 12/03/23  1740 12/04/23  0547   * 136 136 132*   K 3.9 3.3* 4.1 3.9   CL 97 97 97 97   CO2 25 24 24 27   * 246* 333* 251*   BUN 36* 43* 46* 56*   CREATININE 3.8* 4.2* 5.4* 5.4*   CALCIUM 8.1* 7.8* 7.7* 8.0*   PROT 8.0 7.1  --  7.1   ALBUMIN 2.8* 2.4*  --  2.6*   BILITOT 1.6* 1.1*  --  1.0   ALKPHOS 221* 192*  --  200*   AST 23 19  --  16   ALT 11 9*  --  10   ANIONGAP 13 15 15 8   , CBC   Recent Labs   Lab 12/02/23  1723 12/03/23  0302 12/04/23  0547   WBC 5.17 5.12 4.76   HGB 11.9* 10.9* 10.3*   HCT 37.5 33.5* 33.1*    151 187   , and Troponin   Recent Labs   Lab 12/02/23  2130   TROPONINI 0.037*       Significant Imaging:       CXR   Cardiomegaly with vascular congestion and edema similar to prior      Echo    Result Date: 5/2/2023  · The left ventricle is mildly enlarged with severely decreased systolic   function. The estimated ejection fraction is 15%.  · There is severe left ventricular global hypokinesis.  · Normal right ventricular size with low normal right ventricular systolic   function.  · Grade I left ventricular diastolic dysfunction.  · Biatrial enlargement.  · Mild-to-moderate mitral regurgitation.  · Severe tricuspid regurgitation.  · The estimated PA systolic pressure is 43 mmHg.  ·  Elevated central venous pressure (15 mmHg).

## 2023-12-04 NOTE — NURSING
Dialysis tx started to the left chest perm cath per orders placed by Dr. Qureshi:    Order Questions    Question Answer   Antibiotics on HD? No   Duration of Treatment 3 hours   Dialyzer F160   Dialysate Temperature (C) 37    mL/min    mL/min   K+ Potassium per Protocol   Ca++ Calcium per Protocol   Na+ Sodium per Protocol   Bicarb Bicarbonate per Protocol   Access Other (please specify)   Fluid Removal (L) 2L   Fluid Removal Instructions maintain SBP > 90 mmHG   Dialysate Bath Solution Protocol (DO NOT MODIFY ANSWER) [email or web address] Dialysate Bath Solution Algorithm (formatted with date).pdf

## 2023-12-05 VITALS
HEIGHT: 65 IN | OXYGEN SATURATION: 96 % | BODY MASS INDEX: 30.3 KG/M2 | DIASTOLIC BLOOD PRESSURE: 63 MMHG | TEMPERATURE: 99 F | SYSTOLIC BLOOD PRESSURE: 145 MMHG | HEART RATE: 92 BPM | RESPIRATION RATE: 18 BRPM | WEIGHT: 181.88 LBS

## 2023-12-05 LAB
ALBUMIN SERPL BCP-MCNC: 2.7 G/DL (ref 3.5–5.2)
ALP SERPL-CCNC: 166 U/L (ref 55–135)
ALT SERPL W/O P-5'-P-CCNC: 9 U/L (ref 10–44)
ANION GAP SERPL CALC-SCNC: 11 MMOL/L (ref 8–16)
AST SERPL-CCNC: 17 U/L (ref 10–40)
BASOPHILS # BLD AUTO: 0.06 K/UL (ref 0–0.2)
BASOPHILS NFR BLD: 1.4 % (ref 0–1.9)
BILIRUB SERPL-MCNC: 1.2 MG/DL (ref 0.1–1)
BUN SERPL-MCNC: 32 MG/DL (ref 6–20)
CALCIUM SERPL-MCNC: 8.6 MG/DL (ref 8.7–10.5)
CHLORIDE SERPL-SCNC: 105 MMOL/L (ref 95–110)
CO2 SERPL-SCNC: 19 MMOL/L (ref 23–29)
CREAT SERPL-MCNC: 3.9 MG/DL (ref 0.5–1.4)
DIFFERENTIAL METHOD: ABNORMAL
EOSINOPHIL # BLD AUTO: 0.1 K/UL (ref 0–0.5)
EOSINOPHIL NFR BLD: 2.3 % (ref 0–8)
ERYTHROCYTE [DISTWIDTH] IN BLOOD BY AUTOMATED COUNT: 17.8 % (ref 11.5–14.5)
EST. GFR  (NO RACE VARIABLE): 12.8 ML/MIN/1.73 M^2
GLUCOSE SERPL-MCNC: 172 MG/DL (ref 70–110)
HCT VFR BLD AUTO: 31.9 % (ref 37–48.5)
HGB BLD-MCNC: 9.9 G/DL (ref 12–16)
IMM GRANULOCYTES # BLD AUTO: 0.01 K/UL (ref 0–0.04)
IMM GRANULOCYTES NFR BLD AUTO: 0.2 % (ref 0–0.5)
LYMPHOCYTES # BLD AUTO: 1 K/UL (ref 1–4.8)
LYMPHOCYTES NFR BLD: 23.3 % (ref 18–48)
MAGNESIUM SERPL-MCNC: 2.1 MG/DL (ref 1.6–2.6)
MCH RBC QN AUTO: 29.7 PG (ref 27–31)
MCHC RBC AUTO-ENTMCNC: 31 G/DL (ref 32–36)
MCV RBC AUTO: 96 FL (ref 82–98)
MONOCYTES # BLD AUTO: 0.6 K/UL (ref 0.3–1)
MONOCYTES NFR BLD: 13.3 % (ref 4–15)
NEUTROPHILS # BLD AUTO: 2.6 K/UL (ref 1.8–7.7)
NEUTROPHILS NFR BLD: 59.5 % (ref 38–73)
NRBC BLD-RTO: 0 /100 WBC
PHOSPHATE SERPL-MCNC: 3.6 MG/DL (ref 2.7–4.5)
PLATELET # BLD AUTO: 178 K/UL (ref 150–450)
PMV BLD AUTO: 10.8 FL (ref 9.2–12.9)
POCT GLUCOSE: 189 MG/DL (ref 70–110)
POCT GLUCOSE: 221 MG/DL (ref 70–110)
POCT GLUCOSE: 228 MG/DL (ref 70–110)
POTASSIUM SERPL-SCNC: 4.2 MMOL/L (ref 3.5–5.1)
PROT SERPL-MCNC: 7.2 G/DL (ref 6–8.4)
RBC # BLD AUTO: 3.33 M/UL (ref 4–5.4)
SODIUM SERPL-SCNC: 135 MMOL/L (ref 136–145)
WBC # BLD AUTO: 4.3 K/UL (ref 3.9–12.7)

## 2023-12-05 PROCEDURE — 99214 PR OFFICE/OUTPT VISIT, EST, LEVL IV, 30-39 MIN: ICD-10-PCS | Mod: HCNC,,,

## 2023-12-05 PROCEDURE — 36415 COLL VENOUS BLD VENIPUNCTURE: CPT | Mod: HCNC | Performed by: STUDENT IN AN ORGANIZED HEALTH CARE EDUCATION/TRAINING PROGRAM

## 2023-12-05 PROCEDURE — 25000003 PHARM REV CODE 250: Mod: HCNC | Performed by: STUDENT IN AN ORGANIZED HEALTH CARE EDUCATION/TRAINING PROGRAM

## 2023-12-05 PROCEDURE — 99214 PR OFFICE/OUTPT VISIT, EST, LEVL IV, 30-39 MIN: ICD-10-PCS | Mod: HCNC,,, | Performed by: NURSE PRACTITIONER

## 2023-12-05 PROCEDURE — 96365 THER/PROPH/DIAG IV INF INIT: CPT | Mod: 59

## 2023-12-05 PROCEDURE — G0378 HOSPITAL OBSERVATION PER HR: HCPCS | Mod: HCNC

## 2023-12-05 PROCEDURE — 83735 ASSAY OF MAGNESIUM: CPT | Mod: HCNC | Performed by: STUDENT IN AN ORGANIZED HEALTH CARE EDUCATION/TRAINING PROGRAM

## 2023-12-05 PROCEDURE — 80053 COMPREHEN METABOLIC PANEL: CPT | Mod: HCNC | Performed by: STUDENT IN AN ORGANIZED HEALTH CARE EDUCATION/TRAINING PROGRAM

## 2023-12-05 PROCEDURE — 85025 COMPLETE CBC W/AUTO DIFF WBC: CPT | Mod: HCNC | Performed by: STUDENT IN AN ORGANIZED HEALTH CARE EDUCATION/TRAINING PROGRAM

## 2023-12-05 PROCEDURE — 99214 OFFICE O/P EST MOD 30 MIN: CPT | Mod: HCNC,,, | Performed by: NURSE PRACTITIONER

## 2023-12-05 PROCEDURE — 25000003 PHARM REV CODE 250: Mod: HCNC | Performed by: INTERNAL MEDICINE

## 2023-12-05 PROCEDURE — 63600175 PHARM REV CODE 636 W HCPCS: Mod: HCNC | Performed by: INTERNAL MEDICINE

## 2023-12-05 PROCEDURE — 99214 OFFICE O/P EST MOD 30 MIN: CPT | Mod: HCNC,,,

## 2023-12-05 PROCEDURE — 84100 ASSAY OF PHOSPHORUS: CPT | Mod: HCNC | Performed by: STUDENT IN AN ORGANIZED HEALTH CARE EDUCATION/TRAINING PROGRAM

## 2023-12-05 RX ORDER — INSULIN ASPART INJECTION 100 [IU]/ML
1-5 INJECTION, SOLUTION SUBCUTANEOUS
Qty: 3 PEN | Refills: 3 | OUTPATIENT
Start: 2023-12-05 | End: 2023-12-05 | Stop reason: SDUPTHER

## 2023-12-05 RX ORDER — DEXTROSE 4 G
TABLET,CHEWABLE ORAL
Qty: 1 EACH | Refills: 0 | Status: SHIPPED | OUTPATIENT
Start: 2023-12-05

## 2023-12-05 RX ORDER — LANCETS
EACH MISCELLANEOUS
Qty: 100 EACH | Refills: 3 | Status: SHIPPED | OUTPATIENT
Start: 2023-12-05 | End: 2023-12-05 | Stop reason: SDUPTHER

## 2023-12-05 RX ORDER — PEN NEEDLE, DIABETIC 30 GX3/16"
1 NEEDLE, DISPOSABLE MISCELLANEOUS
Qty: 50 EACH | Refills: 3 | Status: SHIPPED | OUTPATIENT
Start: 2023-12-05 | End: 2023-12-05 | Stop reason: SDUPTHER

## 2023-12-05 RX ORDER — INSULIN PUMP SYRINGE, 3 ML
EACH MISCELLANEOUS
Qty: 1 EACH | Refills: 0 | Status: SHIPPED | OUTPATIENT
Start: 2023-12-05 | End: 2023-12-05 | Stop reason: SDUPTHER

## 2023-12-05 RX ORDER — INSULIN ASPART INJECTION 100 [IU]/ML
1-5 INJECTION, SOLUTION SUBCUTANEOUS
Qty: 3 PEN | Refills: 3 | OUTPATIENT
Start: 2023-12-05 | End: 2023-12-26 | Stop reason: SDUPTHER

## 2023-12-05 RX ORDER — LANCETS
EACH MISCELLANEOUS
Qty: 100 EACH | Refills: 3 | Status: SHIPPED | OUTPATIENT
Start: 2023-12-05

## 2023-12-05 RX ORDER — PEN NEEDLE, DIABETIC 30 GX3/16"
1 NEEDLE, DISPOSABLE MISCELLANEOUS
Qty: 50 EACH | Refills: 3 | Status: SHIPPED | OUTPATIENT
Start: 2023-12-05

## 2023-12-05 RX ORDER — SODIUM CHLORIDE 9 MG/ML
INJECTION, SOLUTION INTRAVENOUS ONCE
Status: DISCONTINUED | OUTPATIENT
Start: 2023-12-06 | End: 2023-12-05 | Stop reason: HOSPADM

## 2023-12-05 RX ADMIN — CALCITRIOL CAPSULES 0.5 MCG 0.5 MCG: 0.5 CAPSULE ORAL at 08:12

## 2023-12-05 RX ADMIN — APIXABAN 5 MG: 5 TABLET, FILM COATED ORAL at 08:12

## 2023-12-05 RX ADMIN — INSULIN ASPART 2 UNITS: 100 INJECTION, SOLUTION INTRAVENOUS; SUBCUTANEOUS at 04:12

## 2023-12-05 RX ADMIN — ATORVASTATIN CALCIUM 80 MG: 40 TABLET, FILM COATED ORAL at 08:12

## 2023-12-05 RX ADMIN — ALLOPURINOL 50 MG: 300 TABLET ORAL at 08:12

## 2023-12-05 RX ADMIN — CLOPIDOGREL BISULFATE 75 MG: 75 TABLET ORAL at 08:12

## 2023-12-05 RX ADMIN — HYDRALAZINE HYDROCHLORIDE 50 MG: 50 TABLET ORAL at 08:12

## 2023-12-05 RX ADMIN — CALCIUM ACETATE 1334 MG: 667 CAPSULE ORAL at 04:12

## 2023-12-05 RX ADMIN — ISOSORBIDE DINITRATE 40 MG: 20 TABLET ORAL at 04:12

## 2023-12-05 RX ADMIN — ISOSORBIDE DINITRATE 40 MG: 20 TABLET ORAL at 08:12

## 2023-12-05 RX ADMIN — CEFTRIAXONE 2 G: 2 INJECTION, POWDER, FOR SOLUTION INTRAMUSCULAR; INTRAVENOUS at 06:12

## 2023-12-05 RX ADMIN — INSULIN ASPART 3 UNITS: 100 INJECTION, SOLUTION INTRAVENOUS; SUBCUTANEOUS at 12:12

## 2023-12-05 RX ADMIN — HYDRALAZINE HYDROCHLORIDE 50 MG: 50 TABLET ORAL at 04:12

## 2023-12-05 RX ADMIN — ACETAMINOPHEN 1000 MG: 500 TABLET ORAL at 10:12

## 2023-12-05 RX ADMIN — SERTRALINE HYDROCHLORIDE 50 MG: 50 TABLET ORAL at 08:12

## 2023-12-05 RX ADMIN — CALCIUM ACETATE 1334 MG: 667 CAPSULE ORAL at 08:12

## 2023-12-05 RX ADMIN — MUPIROCIN: 20 OINTMENT TOPICAL at 08:12

## 2023-12-05 RX ADMIN — CARVEDILOL 6.25 MG: 6.25 TABLET, FILM COATED ORAL at 08:12

## 2023-12-05 NOTE — CONSULTS
[facility] - Intensive Care ([facility]  Endocrinology  Diabetes Consult Note    Consult Requested by: Dixon Prakash MD   Reason for admit: Hypothermia    HISTORY OF PRESENT ILLNESS:  Reason for Consult: Management of T2DM, Hyperglycemia     Surgical Procedure and Date: n/a    Diabetes diagnosis year: 2006 per chart review     Home Diabetes Medications:    Patient states she takes 8 units levemir, but unclear how often she takes it. States she doesn't take it when her sugar is low but cannot confirm what sugars typically run. States her  administers her insulin for her and she last took insulin 1 week ago- although per chart review  states insulin was given day of admission when BG was in ~30s. Also admits to poor appetite and not eating much throughout the day.     Diabetes Complications include:     Hyperglycemia and Hypoglycemia     Complicating diabetes co morbidities:   CHF and CKD      HPI:   Patient is a 58 y.o. female with past medical history significant for hypertension, hyperlipidemia, ESRD- HD Monday Wednesday Friday, bilateral lower extremity AKAs who is presenting with acute altered mental status during her dialysis. She was on the dialysis for approximately 2 hours and eventually became unresponsive. They stopped dialysis and called for ambulance to take her for emergency evaluation. Around arrival to the emergency department broad labs were sent off which identified hypoglycemia in the 30s her which she was treated with oral glucose. In the emergency department, her vitals were found to be hypothermic and this was confirmed with a rectal temperature. Endocrine consulted for BG management.     Interval HPI:   No acute events overnight. Patient in room 83985/04065 A. Blood glucose stable. BG at and above goal on current insulin regimen (SSI and basal). Steroid use- None       Renal function- Abnormal - Cr 3.9    Vasopressors-  None     Diet diabetic Renal; 1500 Calorie; Fluid -  1500mL     Eating:   <25%  Nausea: No  Hypoglycemia and intervention: No  Fever: No  TPN and/or TF: No    PMH, PSH, FH, SH updated and reviewed     ROS:    Review of Systems   Constitutional:  Negative for unexpected weight change.   Gastrointestinal:  Negative for constipation, diarrhea, nausea and vomiting.   Endocrine: Negative for polydipsia and polyuria.       Current Medications and/or Treatments Impacting Glycemic Control  Immunotherapy:    Immunosuppressants       None          Steroids:   Hormones (From admission, onward)      Start     Stop Route Frequency Ordered    12/02/23 2251  melatonin tablet 6 mg         -- Oral Nightly PRN 12/02/23 2251          Pressors:    Autonomic Drugs (From admission, onward)      None          Hyperglycemia/Diabetes Medications:   Antihyperglycemics (From admission, onward)      Start     Stop Route Frequency Ordered    12/03/23 2100  insulin detemir U-100 (Levemir) pen 8 Units         -- SubQ Nightly 12/02/23 2251 12/02/23 2349  insulin aspart U-100 pen 0-5 Units         -- SubQ Before meals & nightly PRN 12/02/23 2251             PHYSICAL EXAMINATION:  Vitals:    12/05/23 1112   BP:    Pulse: 93   Resp:    Temp:      Body mass index is 30.27 kg/m².     Physical Exam  Constitutional:       General: She is not in acute distress.     Appearance: Normal appearance. She is not ill-appearing.   HENT:      Head: Normocephalic and atraumatic.      Right Ear: External ear normal.      Left Ear: External ear normal.      Nose: Nose normal.   Cardiovascular:      Rate and Rhythm: Normal rate and regular rhythm.   Pulmonary:      Effort: Pulmonary effort is normal. No respiratory distress.   Neurological:      General: No focal deficit present.      Mental Status: She is alert and oriented to person, place, and time.   Psychiatric:         Mood and Affect: Mood normal.         Behavior: Behavior normal.            Labs Reviewed and Include   Recent Labs   Lab 12/05/23  0596   GLU  "172*   CALCIUM 8.6*   ALBUMIN 2.7*   PROT 7.2   *   K 4.2   CO2 19*      BUN 32*   CREATININE 3.9*   ALKPHOS 166*   ALT 9*   AST 17   BILITOT 1.2*     Lab Results   Component Value Date    WBC 4.30 12/05/2023    HGB 9.9 (L) 12/05/2023    HCT 31.9 (L) 12/05/2023    MCV 96 12/05/2023     12/05/2023     No results for input(s): "TSH", "FREET4" in the last 168 hours.  Lab Results   Component Value Date    HGBA1C 10.5 (H) 12/02/2023       Nutritional status:   Body mass index is 30.27 kg/m².  Lab Results   Component Value Date    ALBUMIN 2.7 (L) 12/05/2023    ALBUMIN 2.6 (L) 12/04/2023    ALBUMIN 2.4 (L) 12/03/2023     Lab Results   Component Value Date    PREALBUMIN 10 (L) 02/03/2022    PREALBUMIN 8 (L) 06/07/2021    PREALBUMIN 6 (L) 05/31/2021       Estimated Creatinine Clearance: 16.9 mL/min (A) (based on SCr of 3.9 mg/dL (H)).    Accu-Checks  Recent Labs     12/03/23  1217 12/03/23  1632 12/03/23  2043 12/04/23  0824 12/04/23  1158 12/04/23  1600 12/04/23  2009 12/04/23  2051 12/05/23  0801 12/05/23  1155   POCTGLUCOSE 241* 318* 348* 189* 169* 185* 183* 200* 189* 228*        ASSESSMENT and PLAN    Renal/  Chronic kidney disease-mineral and bone disorder    Titrate insulin slowly to avoid hypoglycemia as the risk of hypoglycemia increases with decreased creatinine clearance.      Endocrine  Type 2 diabetes mellitus with hyperglycemia, with long-term current use of insulin  BG goal: 140-180. Hypoglycemic upon admission BG~30s, poor historian, on abx suspended in dextrose and poor appetite.     - Will continue 8 units Levemir HS given hx of hypoglycemia and poor appetite. May increase to 10 units HS if BG continues to trend upward  - Continue LDC (150/50) prn   - POCT Glucose AC/HS   - Hypoglycemia protocol in place      ** Please notify Endocrine for any change and/or advance in diet**  ** Please call Endocrine for any BG related issues **     Discharge Planning:   - Continue Levemir 8 units daily " (eat snack if BG <100)   - Start Fiasp (insurance preferred) LDC SSI (150/50) prn TIDWM   - POCT Glucose AC/HS   - Will need to re-establish care with outpatient endocrine provider.       Other  * Hypothermia  Managed per primary team          Carol Rhodes PA-C  Endocrinology  [facility] - [facility] ([place])

## 2023-12-05 NOTE — ASSESSMENT & PLAN NOTE
Patient with Long standing persistent (>12 months) atrial fibrillation which is controlled currently with Beta Blocker. Patient is currently in atrial fibrillation.[id number] Score: 3. HASBLED Score: 3. Anticoagulation indicated. Anticoagulation done with apixaban .      Plan:  - Rate control with target HR <110  - carvedilol 6.25 b.i.d.  - Hold for SBP <90/60 or P<45 bpm  - Patient's [id number] score is 4, annual risk of stroke is 6.7  - Score > 1 requires anticoagulation preferably with Apixaban  - HAS-BLED Score:  - Hypertension (+1)  Yes  - Impaired Renal Function (Dialysis, transplant, Cr >2.26 mg/dL) (+1)  Yes  - Impaired Liver Function (Cirrhosis or bilirubin >2x normal with AST/ALT/AP >3x normal) (+1)  No  - History of Stroke (+1)  No  - History of Bleeding (+1)  No  - Labile INRs (Unstable/high INRs, time in therapeutic range <60%) (+1)  No  - >65 years (+1)  no  - Drugs (Antiplatelet agents, NSAIDs) (+1)  Yes  - Alcohol Consumption (+1)  No    - Total Score:  3  - Risk of bleedin.8%    - Score >=3 indicates high bleeding risk.  - HAS-BLED > CHADVAS indicates risk greater than benefit.  - Magnesium > 2, Potassium > 4  - stable however will monitor with telemetry for 24 hours given hypoglycemia and hypothermia.  - Synchronized cardioversion if hemodynamically unstable

## 2023-12-05 NOTE — NURSING
AVS and discharge instructions given. All questions answered. IV and tele box removed. Pt awaiting bedside med delivery at this time.

## 2023-12-05 NOTE — ASSESSMENT & PLAN NOTE
HFrEF 15%  Given lasix 40IV 12/3 for crackles on exam and reported orthopnea.   - cardiology consult   - telemetery  - strict I&Os  - fluid restriction  - goal net negative 1.5L  - s/p agressive diuresis  - HD for volume removal   - Cardiology consulted  -- Outpatient follow up  - K>4, Mag>2    Patient is identified as having Combined Systolic and Diastolic heart failure that is Chronic. CHF is currently uncontrolled due to >3 pillow orthopnea and Rales/crackles on pulmonary exam. Latest ECHO performed and demonstrates- Results for orders placed during the hospital encounter of 05/01/23    Echo    Interpretation Summary  · The left ventricle is mildly enlarged with severely decreased systolic function. The estimated ejection fraction is 15%.  · There is severe left ventricular global hypokinesis.  · Normal right ventricular size with low normal right ventricular systolic function.  · Grade I left ventricular diastolic dysfunction.  · Biatrial enlargement.  · Mild-to-moderate mitral regurgitation.  · Severe tricuspid regurgitation.  · The estimated PA systolic pressure is 43 mmHg.  · Elevated central venous pressure (15 mmHg).  . Continue Beta Blocker and Furosemide and monitor clinical status closely. Monitor on telemetry. Patient is off CHF pathway.  Monitor strict Is&Os and daily weights.  Place on fluid restriction of 2 L. Continue to stress to patient importance of self efficacy and  on diet for CHF. Last BNP reviewed- and noted below   Recent Labs   Lab 12/03/23  1233   BNP 1,505*     .

## 2023-12-05 NOTE — SUBJECTIVE & OBJECTIVE
Interval History: HD yesterday, tolerated well. UF 2L.     Review of patient's allergies indicates:   Allergen Reactions    Ciprofloxacin Itching    Contrast media      Kidney injury    Iodine      Kidney injury     Current Facility-Administered Medications   Medication Frequency    acetaminophen tablet 1,000 mg Q8H PRN    acetaminophen tablet 650 mg Q4H PRN    allopurinol split tablet 50 mg Every other day    apixaban tablet 5 mg BID    atorvastatin tablet 80 mg Daily    calcitRIOL capsule 0.5 mcg Daily    calcium acetate(phosphat bind) capsule 1,334 mg TID WM    carvediloL tablet 6.25 mg BID    cefTRIAXone (ROCEPHIN) 2 g in dextrose 5 % in water (D5W) 100 mL IVPB (MB+) Q24H    clopidogreL tablet 75 mg Daily    dextrose 10% bolus 125 mL 125 mL PRN    dextrose 10% bolus 250 mL 250 mL PRN    glucagon (human recombinant) injection 1 mg PRN    glucose chewable tablet 16 g PRN    glucose chewable tablet 24 g PRN    hydrALAZINE tablet 50 mg TID    insulin aspart U-100 pen 0-5 Units QID (AC + HS) PRN    insulin detemir U-100 (Levemir) pen 8 Units QHS    isosorbide dinitrate tablet 40 mg TID    melatonin tablet 6 mg Nightly PRN    mupirocin 2 % ointment BID    naloxone 0.4 mg/mL injection 0.02 mg PRN    sertraline tablet 50 mg Daily    sodium chloride 0.9% flush 10 mL PRN    sodium chloride 0.9% flush 10 mL PRN       Objective:     Vital Signs (Most Recent):  Temp: 98.8 °F (37.1 °C) (12/05/23 0803)  Pulse: 98 (12/05/23 0803)  Resp: 18 (12/05/23 0803)  BP: (!) 173/71 (12/05/23 0803)  SpO2: 100 % (12/05/23 0803) Vital Signs (24h Range):  Temp:  [97.2 °F (36.2 °C)-99 °F (37.2 °C)] 98.8 °F (37.1 °C)  Pulse:  [90-98] 98  Resp:  [18-21] 18  SpO2:  [94 %-100 %] 100 %  BP: (126-173)/(60-73) 173/71     Weight: 82.5 kg (181 lb 14.1 oz) (12/05/23 0600)  Body mass index is 30.27 kg/m².  Body surface area is 1.95 meters squared.    I/O last 3 completed shifts:  In: 1720 [P.O.:920; I.V.:300; Other:500]  Out: 3054 [Urine:250;  Other:2804]     Physical Exam  Vitals and nursing note reviewed.   Constitutional:       General: She is not in acute distress.     Appearance: Normal appearance. She is obese. She is not ill-appearing or toxic-appearing.      Comments: Eating well at bedside.  Bilateral AKA with well-appearing scar.  Previous CABG scar.  Vas-Cath for HD access to the left upper chest wall   HENT:      Head: Normocephalic and atraumatic.      Mouth/Throat:      Mouth: Mucous membranes are moist.   Eyes:      General: No scleral icterus.  Cardiovascular:      Rate and Rhythm: Regular rhythm.      Pulses: Normal pulses.      Heart sounds: Normal heart sounds.   Pulmonary:      Effort: No respiratory distress.   Abdominal:      General: There is no distension.      Palpations: Abdomen is soft. There is no mass.      Tenderness: There is no abdominal tenderness. There is no right CVA tenderness or left CVA tenderness.   Musculoskeletal:      Cervical back: No rigidity.      Right lower leg: No edema.      Left lower leg: No edema.   Lymphadenopathy:      Cervical: No cervical adenopathy.   Skin:     General: Skin is warm and dry.      Capillary Refill: Capillary refill takes less than 2 seconds.      Coloration: Skin is not jaundiced.   Neurological:      General: No focal deficit present.      Mental Status: She is alert and oriented to person, place, and time.          Significant Labs:  CBC:   Recent Labs   Lab 12/05/23  0519   WBC 4.30   RBC 3.33*   HGB 9.9*   HCT 31.9*      MCV 96   MCH 29.7   MCHC 31.0*     CMP:   Recent Labs   Lab 12/05/23  0519   *   CALCIUM 8.6*   ALBUMIN 2.7*   PROT 7.2   *   K 4.2   CO2 19*      BUN 32*   CREATININE 3.9*   ALKPHOS 166*   ALT 9*   AST 17   BILITOT 1.2*     All labs within the past 24 hours have been reviewed.

## 2023-12-05 NOTE — HPI
Reason for Consult: Management of T2DM, Hyperglycemia     Surgical Procedure and Date: n/a    Diabetes diagnosis year: 2006 per chart review     Home Diabetes Medications:    Patient states she takes 8 units levemir, but unclear how often she takes it. States she doesn't take it when her sugar is low but cannot confirm what sugars typically run. States her  administers her insulin for her and she last took insulin 1 week ago- although per chart review  states insulin was given day of admission when BG was in ~30s. Also admits to poor appetite and not eating much throughout the day.     Diabetes Complications include:     Hyperglycemia and Hypoglycemia     Complicating diabetes co morbidities:   CHF and CKD      HPI:   Patient is a 58 y.o. female with past medical history significant for hypertension, hyperlipidemia, ESRD- HD Monday Wednesday Friday, bilateral lower extremity AKAs who is presenting with acute altered mental status during her dialysis. She was on the dialysis for approximately 2 hours and eventually became unresponsive. They stopped dialysis and called for ambulance to take her for emergency evaluation. Around arrival to the emergency department broad labs were sent off which identified hypoglycemia in the 30s her which she was treated with oral glucose. In the emergency department, her vitals were found to be hypothermic and this was confirmed with a rectal temperature. Endocrine consulted for BG management.

## 2023-12-05 NOTE — ASSESSMENT & PLAN NOTE
BG goal: 140-180. Hypoglycemic upon admission BG~30s, poor historian, on abx suspended in dextrose and poor appetite.     - Will continue 8 units Levemir HS given hx of hypoglycemia and poor appetite. May increase to 10 units HS if BG continues to trend upward  - Continue LDC (150/50) prn   - POCT Glucose AC/HS   - Hypoglycemia protocol in place      ** Please notify Endocrine for any change and/or advance in diet**  ** Please call Endocrine for any BG related issues **     Discharge Planning:   TBD. Please notify endocrinology prior to discharge.

## 2023-12-05 NOTE — PROGRESS NOTES
[facility] ([address] Medicine  Progress Note    Patient Name: Suyapa Connelly  MRN: [id number]  Patient Class: OP- Observation   Admission Date: 12/2/2023  Length of Stay: 0 days  Attending Physician: Dixon Prakash MD  Primary Care Provider: Magen Christensen MD        Subjective:     Principal Problem:Hypothermia        HPI:  Patient is a 58-year-old female with past medical history significant for hypertension, hyperlipidemia, ESRD Monday Wednesday Friday, bilateral lower extremity AKAs who is presenting with acute altered mental status during her dialysis.  She was on the dialysis for approximately 2 hours when she started to become progressively more fatigued and then eventually became unresponsive.  They stopped dialysis and called for ambulance to take her for emergency evaluation.  They did not check her sugars.  Around arrival to the emergency department broad labs were sent off which identified hypoglycemia in the 30s her which she was treated with oral glucose.  She did not initially improve and so she was given IV dextrose which resulted in her normalizing.  Her  states that she is not a regular eater and often will not eat as much in the hospital as when she is out and about and seems to have highly variable intake.  He states that she had not eaten breakfast this morning and taken her normal dose of insulin and he believes that that is why she had the episode.  The  expressed frustration at having issues with her sugars in the past.  They want to establish with endocrinology to see if there is a better way for them to manage her sugars.    In the emergency department, her vitals were found to be hypothermic and this was confirmed with a rectal temperature.  She has a history of AFib which is borderline tachycardic (although history of AFib) and she was placed on a Daniel Hugger.  No empiric antibiotics were given and no cultures were collected.  She denies  any obvious signs or symptoms suggestive of a source and her chest x-ray is suggestive of vascular congestion but no overall consolidation.  They deny any home fevers, sick contacts, dysuria or urinary CV symptoms.  She continues to urinate and was recently increased to furosemide 40 mg b.i.d..  She denies any cough or chest pain or shortness of breath.      Overview/Hospital Course:  Patient admitted for hypoglycemia (insulin use w/o eating) and hypothermia. No evidence of infection on presentation so antibiotics were not initiated. Patinet vital signs have been stable since admission.  is very concerned about patient's state of health.     Heart failure - Per , patient has been having difficulty sleeping and using many pillows at night. Patient denies changes in sleep patterns or difficult breathing. Previously, patient would have peripheral edema but since bilateral AKA that no longer occurs. Patient was referred to cardiology for HFrEF (15%), records show no-show in September 2023.     Diabetes -  reports that patient will restrict her diet because of the limited insulin she has been prescribed. Reports that  shares his insulin with patient if needed. Has been referred to endocrinology in the past but it does not appear appointment was ever scheduled. Discussed in detail that insulin titration is done in the outpatient setting for the best results. As  has noted that patient does not eat much when in the hospital, regimen IP is often not adequate at home.     ESRD -  would like to speak with nephrologist prior to discharge.     Interval History:   No events overnight. Patient notes that she feels better today overall. No complaints. Endocrine consulted for DM management.       Review of Systems   Constitutional:  Negative for activity change, appetite change, chills, diaphoresis, fatigue and fever.   HENT:  Negative for rhinorrhea and sore throat.    Respiratory:   Negative for cough, chest tightness and shortness of breath.    Cardiovascular:  Negative for chest pain and palpitations.   Gastrointestinal:  Negative for abdominal pain, constipation, diarrhea and nausea.   Endocrine: Negative for cold intolerance.   Genitourinary:  Negative for decreased urine volume and dysuria.   Musculoskeletal:  Negative for arthralgias and myalgias.   Skin:  Negative for rash and wound.   Neurological:  Negative for dizziness, weakness, numbness and headaches.   Psychiatric/Behavioral:  Negative for agitation, behavioral problems and confusion.      Objective:     Vital Signs (Most Recent):  Temp: 98.9 °F (37.2 °C) (12/05/23 1200)  Pulse: 94 (12/05/23 1200)  Resp: 18 (12/05/23 1200)  BP: 124/72 (12/05/23 1200)  SpO2: 96 % (12/05/23 1200) Vital Signs (24h Range):  Temp:  [98.4 °F (36.9 °C)-99 °F (37.2 °C)] 98.9 °F (37.2 °C)  Pulse:  [90-98] 94  Resp:  [18-21] 18  SpO2:  [94 %-100 %] 96 %  BP: (124-173)/(62-73) 124/72     Weight: 82.5 kg (181 lb 14.1 oz)  Body mass index is 30.27 kg/m².    Intake/Output Summary (Last 24 hours) at 12/5/2023 1355  Last data filed at 12/5/2023 0600  Gross per 24 hour   Intake 520 ml   Output 250 ml   Net 270 ml         Physical Exam  Vitals and nursing note reviewed.   Constitutional:       General: She is not in acute distress.     Appearance: Normal appearance. She is obese. She is not ill-appearing or toxic-appearing.      Comments: Eating well at bedside.  Bilateral AKA with well-appearing scar.  Previous CABG scar.  Vas-Cath for HD access to the left upper chest wall   HENT:      Head: Normocephalic and atraumatic.      Mouth/Throat:      Mouth: Mucous membranes are moist.   Eyes:      General: No scleral icterus.  Cardiovascular:      Rate and Rhythm: Regular rhythm.      Pulses: Normal pulses.      Heart sounds: Normal heart sounds.   Pulmonary:      Effort: No respiratory distress.      Breath sounds: No rales.   Abdominal:      General: There is no  distension.      Palpations: Abdomen is soft. There is no mass.      Tenderness: There is no abdominal tenderness. There is no right CVA tenderness or left CVA tenderness.   Musculoskeletal:      Cervical back: No rigidity.      Right lower leg: No edema.      Left lower leg: No edema.   Lymphadenopathy:      Cervical: No cervical adenopathy.   Skin:     General: Skin is warm and dry.      Capillary Refill: Capillary refill takes less than 2 seconds.      Coloration: Skin is not jaundiced.   Neurological:      General: No focal deficit present.      Mental Status: She is alert and oriented to person, place, and time.             Significant Labs: All pertinent labs within the past 24 hours have been reviewed.  CBC:   Recent Labs   Lab 12/04/23  0547 12/05/23  0519   WBC 4.76 4.30   HGB 10.3* 9.9*   HCT 33.1* 31.9*    178     CMP:   Recent Labs   Lab 12/03/23  1740 12/04/23  0547 12/05/23 0519    132* 135*   K 4.1 3.9 4.2   CL 97 97 105   CO2 24 27 19*   * 251* 172*   BUN 46* 56* 32*   CREATININE 5.4* 5.4* 3.9*   CALCIUM 7.7* 8.0* 8.6*   PROT  --  7.1 7.2   ALBUMIN  --  2.6* 2.7*   BILITOT  --  1.0 1.2*   ALKPHOS  --  200* 166*   AST  --  16 17   ALT  --  10 9*   ANIONGAP 15 8 11       Significant Imaging: I have reviewed all pertinent imaging results/findings within the past 24 hours.    Assessment/Plan:      * Hypothermia  Patient is a 58-year-old woman with past medical history of ESRD Monday Wednesday Friday, bilateral AKA, presenting for altered mental status and subsequently found to be hypoglycemic and hypothermic.  She was given p.o. and IV glucose with stabilization of her blood glucose and mental status.  She states that she is back to normal baseline and her  at bedside agrees however there is no clear explanation as to why she had this episode of hypothermia and hypoglycemia.  She denies any fevers or chills.  No lab work is suggestive source of infection and imaging is largely  unremarkable. Antibiotics not started on admission due to low suspicion for sepsis and infection.     - Unexplained fever:  Hypothermia being treated as fever equivalent > no further episodes.  - Lactate within normal limits  - Urine culture with P mirabilis, CTX x3 doses      Type 2 diabetes mellitus with hyperglycemia, with long-term current use of insulin  Last A1c:   Lab Results   Component Value Date    HGBA1C 10.5 (H) 12/02/2023      Home regimen:  Detemir 8 q.h.s.    Current inpatient regimen:  Antihyperglycemics (From admission, onward)      Start     Stop Route Frequency Ordered    12/03/23 2100  insulin detemir U-100 (Levemir) pen 8 Units         -- SubQ Nightly 12/02/23 2251    12/02/23 2349  insulin aspart U-100 pen 0-5 Units         -- SubQ Before meals & nightly PRN 12/02/23 2251          - continue detemir 8 q.h.s. plus low-dose SSI      Plan:  - Diabetic diet  - POCT glucose ACHS  - Will monitor glucose results and adjust insulin regimen accordingly  - needs outpatient follow-up with endocrine or PCP, desires dexcom    Blood Sugars (AccuCheck):    Recent Labs     12/04/23  1158 12/04/23  1600 12/04/23  2009 12/04/23  2051 12/05/23  0801 12/05/23  1155   POCTGLUCOSE 169* 185* 183* 200* 189* 228*       - Endocrine consulted      Hypoglycemia  See hypothermia.  Glucoses on admission were 30 and self corrected following oral and IV glucose.  No antibiotics were given, no blood cultures were collected. Per , patient will skip meals in attempt to control sugars and reduce insulin need. Reports multiple episodes of hypoglycemia previously. See hospital course for further discussion.  - will monitor glucose while IP        Severe obesity (BMI 35.0-39.9) with comorbidity  Body mass index is 36.28 kg/m². Morbid obesity complicates all aspects of disease management from diagnostic modalities to treatment. Weight loss encouraged and health benefits explained to patient.     Encouraged patient to continue  to monitor for pressure injury given her profound debility from bilateral AKA        Chronic kidney disease-mineral and bone disorder  Creatine stable for now. BMP reviewed- noted Estimated Creatinine Clearance: 17.2 mL/min (A) (based on SCr of 4.2 mg/dL (H)). according to latest data. Based on current GFR, CKD stage is end stage.  Monitor UOP and serial BMP and adjust therapy as needed. Renally dose meds. Avoid nephrotoxic medications and procedures.    Continue calcitriol    ESRD on dialysis  Creatine stable for now. BMP reviewed- noted Estimated Creatinine Clearance: 17.2 mL/min (A) (based on SCr of 4.2 mg/dL (H)). according to latest data. Based on current GFR, CKD stage is end stage.  Monitor UOP and serial BMP and adjust therapy as needed. Renally dose meds. Avoid nephrotoxic medications and procedures.    Still produces urine approximately 5 to 6 times a day.  Furosemide was recently adjusted to 40 b.i.d. p.o.    Debility  Patient with Chronic debility due to bed confinement status, other reduced mobility, and bilateral above knee amputations . Latest AMPAC and GEMS scores have not been reviewed. Evaluation for etiology is complete. Plan includes fall precautions in place.    Chronic without change in mobility.  Reminded to monitor for pressure injuries.  Continues to remain mobile and active via wheelchair with minor assistance from .    S/P AKA (above knee amputation) bilateral  See debility      Chronic combined systolic and diastolic heart failure  HFrEF 15%  Given lasix 40IV 12/3 for crackles on exam and reported orthopnea.   - cardiology consult   - telemetery  - strict I&Os  - fluid restriction  - goal net negative 1.5L  - s/p agressive diuresis  - HD for volume removal   - Cardiology consulted  -- Outpatient follow up  - K>4, Mag>2    Patient is identified as having Combined Systolic and Diastolic heart failure that is Chronic. CHF is currently uncontrolled due to >3 pillow orthopnea and  Rales/crackles on pulmonary exam. Latest ECHO performed and demonstrates- Results for orders placed during the hospital encounter of 05/01/23    Echo    Interpretation Summary  · The left ventricle is mildly enlarged with severely decreased systolic function. The estimated ejection fraction is 15%.  · There is severe left ventricular global hypokinesis.  · Normal right ventricular size with low normal right ventricular systolic function.  · Grade I left ventricular diastolic dysfunction.  · Biatrial enlargement.  · Mild-to-moderate mitral regurgitation.  · Severe tricuspid regurgitation.  · The estimated PA systolic pressure is 43 mmHg.  · Elevated central venous pressure (15 mmHg).  . Continue Beta Blocker and Furosemide and monitor clinical status closely. Monitor on telemetry. Patient is off CHF pathway.  Monitor strict Is&Os and daily weights.  Place on fluid restriction of 2 L. Continue to stress to patient importance of self efficacy and  on diet for CHF. Last BNP reviewed- and noted below   Recent Labs   Lab 12/03/23  1233   BNP 1,505*     .      Paroxysmal atrial fibrillation  Patient with Long standing persistent (>12 months) atrial fibrillation which is controlled currently with Beta Blocker. Patient is currently in atrial fibrillation.[id number] Score: 3. HASBLED Score: 3. Anticoagulation indicated. Anticoagulation done with apixaban .      Plan:  - Rate control with target HR <110  - carvedilol 6.25 b.i.d.  - Hold for SBP <90/60 or P<45 bpm  - Patient's [id number] score is 4, annual risk of stroke is 6.7  - Score > 1 requires anticoagulation preferably with Apixaban  - HAS-BLED Score:  - Hypertension (+1)  Yes  - Impaired Renal Function (Dialysis, transplant, Cr >2.26 mg/dL) (+1)  Yes  - Impaired Liver Function (Cirrhosis or bilirubin >2x normal with AST/ALT/AP >3x normal) (+1)  No  - History of Stroke (+1)  No  - History of Bleeding (+1)  No  - Labile INRs (Unstable/high INRs, time in  therapeutic range <60%) (+1)  No  - >65 years (+1)  no  - Drugs (Antiplatelet agents, NSAIDs) (+1)  Yes  - Alcohol Consumption (+1)  No    - Total Score:  3  - Risk of bleedin.8%    - Score >=3 indicates high bleeding risk.  - HAS-BLED > CHADVAS indicates risk greater than benefit.  - Magnesium > 2, Potassium > 4  - stable however will monitor with telemetry for 24 hours given hypoglycemia and hypothermia.  - Synchronized cardioversion if hemodynamically unstable          S/P CABG x 1  Continue clopidogrel.  Not on aspirin due to apixaban.      CAD (coronary artery disease)  Patient with known CAD s/p CABG, which is controlled Will continue Plavix and Statin and monitor for S/Sx of angina/ACS. Continue to monitor on telemetry.     Essential hypertension  Chronic, uncontrolled. Latest blood pressure and vitals reviewed-     Temp:  [98.4 °F (36.9 °C)-99 °F (37.2 °C)]   Pulse:  [90-98]   Resp:  [18-21]   BP: (124-173)/(62-73)   SpO2:  [94 %-100 %] .   Home meds for hypertension were reviewed and noted below.   Hypertension Medications               carvediloL (COREG) 6.25 MG tablet Take 1 tablet (6.25 mg total) by mouth 2 (two) times daily.    hydrALAZINE (APRESOLINE) 50 MG tablet Take 1 tablet (50 mg total) by mouth 3 (three) times daily.    isosorbide dinitrate (ISORDIL) 20 MG tablet Take 2 tablets (40 mg total) by mouth 3 (three) times daily.            While in the hospital, will manage blood pressure as follows; Continue home antihypertensive regimen.  Will hold steady for tonight and adjust in morning if uncontrolled overnight.    Will utilize p.r.n. blood pressure medication only if patient's blood pressure greater than 160/100 and she develops symptoms such as worsening chest pain or shortness of breath.      VTE Risk Mitigation (From admission, onward)           Ordered     heparin (porcine) injection 1,000 Units  As needed (PRN)         23 1016     apixaban tablet 5 mg  2 times daily          12/02/23 2251     IP VTE HIGH RISK PATIENT  Once         12/02/23 2251     Place sequential compression device  Until discontinued         12/02/23 2251     Reason for No Pharmacological VTE Prophylaxis  Once        Question:  Reasons:  Answer:  Already adequately anticoagulated on oral Anticoagulants    12/02/23 2251                    Discharge Planning   DEVAN: 12/4/2023     Code Status: Full Code   Is the patient medically ready for discharge?: No    Reason for patient still in hospital (select all that apply): Patient trending condition, Laboratory test, Treatment, Consult recommendations, and Pending disposition  Discharge Plan A: Home, Home with family                  Dixon Prakash MD  Department of Hospital Medicine   [facility] - Intensive Care ([id number])

## 2023-12-05 NOTE — PROGRESS NOTES
[facility] ([place] [address])  Nephrology  Progress Note    Patient Name: Suyapa Connelly  MRN: [id number]  Admission Date: 12/2/2023  Hospital Length of Stay: 0 days  Attending Provider: Dixon Prakash MD   Primary Care Physician: Magen Christensen MD  Principal Problem:Hypothermia    Subjective:     Interval History: HD yesterday, tolerated well. UF 2L.     Review of patient's allergies indicates:   Allergen Reactions    Ciprofloxacin Itching    Contrast media      Kidney injury    Iodine      Kidney injury     Current Facility-Administered Medications   Medication Frequency    acetaminophen tablet 1,000 mg Q8H PRN    acetaminophen tablet 650 mg Q4H PRN    allopurinol split tablet 50 mg Every other day    apixaban tablet 5 mg BID    atorvastatin tablet 80 mg Daily    calcitRIOL capsule 0.5 mcg Daily    calcium acetate(phosphat bind) capsule 1,334 mg TID WM    carvediloL tablet 6.25 mg BID    cefTRIAXone (ROCEPHIN) 2 g in dextrose 5 % in water (D5W) 100 mL IVPB (MB+) Q24H    clopidogreL tablet 75 mg Daily    dextrose 10% bolus 125 mL 125 mL PRN    dextrose 10% bolus 250 mL 250 mL PRN    glucagon (human recombinant) injection 1 mg PRN    glucose chewable tablet 16 g PRN    glucose chewable tablet 24 g PRN    hydrALAZINE tablet 50 mg TID    insulin aspart U-100 pen 0-5 Units QID (AC + HS) PRN    insulin detemir U-100 (Levemir) pen 8 Units QHS    isosorbide dinitrate tablet 40 mg TID    melatonin tablet 6 mg Nightly PRN    mupirocin 2 % ointment BID    naloxone 0.4 mg/mL injection 0.02 mg PRN    sertraline tablet 50 mg Daily    sodium chloride 0.9% flush 10 mL PRN    sodium chloride 0.9% flush 10 mL PRN       Objective:     Vital Signs (Most Recent):  Temp: 98.8 °F (37.1 °C) (12/05/23 0803)  Pulse: 98 (12/05/23 0803)  Resp: 18 (12/05/23 0803)  BP: (!) 173/71 (12/05/23 0803)  SpO2: 100 % (12/05/23 0803) Vital Signs (24h Range):  Temp:  [97.2 °F (36.2 °C)-99 °F (37.2 °C)] 98.8 °F (37.1 °C)  Pulse:   [90-98] 98  Resp:  [18-21] 18  SpO2:  [94 %-100 %] 100 %  BP: (126-173)/(60-73) 173/71     Weight: 82.5 kg (181 lb 14.1 oz) (12/05/23 0600)  Body mass index is 30.27 kg/m².  Body surface area is 1.95 meters squared.    I/O last 3 completed shifts:  In: 1720 [P.O.:920; I.V.:300; Other:500]  Out: 3054 [Urine:250; Other:2804]     Physical Exam  Vitals and nursing note reviewed.   Constitutional:       General: She is not in acute distress.     Appearance: Normal appearance. She is obese. She is not ill-appearing or toxic-appearing.      Comments: Eating well at bedside.  Bilateral AKA with well-appearing scar.  Previous CABG scar.  Vas-Cath for HD access to the left upper chest wall   HENT:      Head: Normocephalic and atraumatic.      Mouth/Throat:      Mouth: Mucous membranes are moist.   Eyes:      General: No scleral icterus.  Cardiovascular:      Rate and Rhythm: Regular rhythm.      Pulses: Normal pulses.      Heart sounds: Normal heart sounds.   Pulmonary:      Effort: No respiratory distress.   Abdominal:      General: There is no distension.      Palpations: Abdomen is soft. There is no mass.      Tenderness: There is no abdominal tenderness. There is no right CVA tenderness or left CVA tenderness.   Musculoskeletal:      Cervical back: No rigidity.      Right lower leg: No edema.      Left lower leg: No edema.   Lymphadenopathy:      Cervical: No cervical adenopathy.   Skin:     General: Skin is warm and dry.      Capillary Refill: Capillary refill takes less than 2 seconds.      Coloration: Skin is not jaundiced.   Neurological:      General: No focal deficit present.      Mental Status: She is alert and oriented to person, place, and time.          Significant Labs:  CBC:   Recent Labs   Lab 12/05/23  0519   WBC 4.30   RBC 3.33*   HGB 9.9*   HCT 31.9*      MCV 96   MCH 29.7   MCHC 31.0*     CMP:   Recent Labs   Lab 12/05/23  0519   *   CALCIUM 8.6*   ALBUMIN 2.7*   PROT 7.2   *   K 4.2    CO2 19*      BUN 32*   CREATININE 3.9*   ALKPHOS 166*   ALT 9*   AST 17   BILITOT 1.2*     All labs within the past 24 hours have been reviewed.         Assessment/Plan:     Renal/  Chronic kidney disease-mineral and bone disorder     - Cont calcitriol 0.5mg  - Cont calcium acetate 1334mg  - Stop sevelamer 800mg TID.   - Renal diet with protein intake goal 1.5 g/kg/d with 1 L fluid restriction   - Novasource with meals  - Daily renal panel so that phos and albumin is monitored daily.     ESRD on dialysis  Ms Connelly is an ESRD  patient admitted after hypoglycemic episode in the setting insulin administration per . She has HD MWF at [facility]. [place] [facility] [place] found to have BS in the 30s.        Lab Results   Component Value Date    CREATININE 4.2 (H) 12/03/2023       -BUN of 43, no signs of uremia, co2 of 24  -k is 3.3    Plan:    Continue MWF iHD schedule while IP   Pre and post HD weight   Strict I &Os  Hgb goal 10-11      Hypoglycemia  Management per primary     Other  * Hypothermia  -        Thank you for your consult. I will follow-up with patient. Please contact us if you have any additional questions.    Katie Monique, CHRIS  Nephrology  [facility] ([facility]

## 2023-12-05 NOTE — PLAN OF CARE
Adequate for discharge.     Problem: Skin Injury Risk Increased  Goal: Skin Health and Integrity  12/5/2023 1724 by Juan Carlos Parra RN  Outcome: Met  12/5/2023 1628 by Juan Carlos Parra RN  Outcome: Ongoing, Progressing     Problem: Infection  Goal: Absence of Infection Signs and Symptoms  Outcome: Met     Problem: Adult Inpatient Plan of Care  Goal: Plan of Care Review  12/5/2023 1724 by Juan Carlos Parra RN  Outcome: Met  12/5/2023 1628 by Juan Carlos Parra RN  Outcome: Ongoing, Progressing  Goal: Patient-Specific Goal (Individualized)  Outcome: Met  Goal: Absence of Hospital-Acquired Illness or Injury  Outcome: Met  Goal: Optimal Comfort and Wellbeing  Outcome: Met  Goal: Readiness for Transition of Care  Outcome: Met     Problem: Device-Related Complication Risk (Hemodialysis)  Goal: Safe, Effective Therapy Delivery  12/5/2023 1724 by Juan Carlos Parra RN  Outcome: Met  12/5/2023 1628 by Juan Carlos Parra RN  Outcome: Ongoing, Progressing     Problem: Hemodynamic Instability (Hemodialysis)  Goal: Effective Tissue Perfusion  Outcome: Met     Problem: Infection (Hemodialysis)  Goal: Absence of Infection Signs and Symptoms  Outcome: Met     Problem: Diabetes Comorbidity  Goal: Blood Glucose Level Within Targeted Range  12/5/2023 1724 by Juan Carlos Parra RN  Outcome: Met  12/5/2023 1628 by Juan Carlos Parra RN  Outcome: Ongoing, Progressing     Problem: Fall Injury Risk  Goal: Absence of Fall and Fall-Related Injury  12/5/2023 1724 by Juan Carlos Parra RN  Outcome: Met  12/5/2023 1628 by Juan Carlos Parra RN  Outcome: Ongoing, Progressing

## 2023-12-05 NOTE — SUBJECTIVE & OBJECTIVE
Interval History:   No events overnight. Patient notes that she feels better today overall. No complaints. Endocrine consulted for DM management.       Review of Systems   Constitutional:  Negative for activity change, appetite change, chills, diaphoresis, fatigue and fever.   HENT:  Negative for rhinorrhea and sore throat.    Respiratory:  Negative for cough, chest tightness and shortness of breath.    Cardiovascular:  Negative for chest pain and palpitations.   Gastrointestinal:  Negative for abdominal pain, constipation, diarrhea and nausea.   Endocrine: Negative for cold intolerance.   Genitourinary:  Negative for decreased urine volume and dysuria.   Musculoskeletal:  Negative for arthralgias and myalgias.   Skin:  Negative for rash and wound.   Neurological:  Negative for dizziness, weakness, numbness and headaches.   Psychiatric/Behavioral:  Negative for agitation, behavioral problems and confusion.      Objective:     Vital Signs (Most Recent):  Temp: 98.9 °F (37.2 °C) (12/05/23 1200)  Pulse: 94 (12/05/23 1200)  Resp: 18 (12/05/23 1200)  BP: 124/72 (12/05/23 1200)  SpO2: 96 % (12/05/23 1200) Vital Signs (24h Range):  Temp:  [98.4 °F (36.9 °C)-99 °F (37.2 °C)] 98.9 °F (37.2 °C)  Pulse:  [90-98] 94  Resp:  [18-21] 18  SpO2:  [94 %-100 %] 96 %  BP: (124-173)/(62-73) 124/72     Weight: 82.5 kg (181 lb 14.1 oz)  Body mass index is 30.27 kg/m².    Intake/Output Summary (Last 24 hours) at 12/5/2023 1355  Last data filed at 12/5/2023 0600  Gross per 24 hour   Intake 520 ml   Output 250 ml   Net 270 ml         Physical Exam  Vitals and nursing note reviewed.   Constitutional:       General: She is not in acute distress.     Appearance: Normal appearance. She is obese. She is not ill-appearing or toxic-appearing.      Comments: Eating well at bedside.  Bilateral AKA with well-appearing scar.  Previous CABG scar.  Vas-Cath for HD access to the left upper chest wall   HENT:      Head: Normocephalic and atraumatic.       Mouth/Throat:      Mouth: Mucous membranes are moist.   Eyes:      General: No scleral icterus.  Cardiovascular:      Rate and Rhythm: Regular rhythm.      Pulses: Normal pulses.      Heart sounds: Normal heart sounds.   Pulmonary:      Effort: No respiratory distress.      Breath sounds: No rales.   Abdominal:      General: There is no distension.      Palpations: Abdomen is soft. There is no mass.      Tenderness: There is no abdominal tenderness. There is no right CVA tenderness or left CVA tenderness.   Musculoskeletal:      Cervical back: No rigidity.      Right lower leg: No edema.      Left lower leg: No edema.   Lymphadenopathy:      Cervical: No cervical adenopathy.   Skin:     General: Skin is warm and dry.      Capillary Refill: Capillary refill takes less than 2 seconds.      Coloration: Skin is not jaundiced.   Neurological:      General: No focal deficit present.      Mental Status: She is alert and oriented to person, place, and time.             Significant Labs: All pertinent labs within the past 24 hours have been reviewed.  CBC:   Recent Labs   Lab 12/04/23  0547 12/05/23  0519   WBC 4.76 4.30   HGB 10.3* 9.9*   HCT 33.1* 31.9*    178     CMP:   Recent Labs   Lab 12/03/23  1740 12/04/23  0547 12/05/23  0519    132* 135*   K 4.1 3.9 4.2   CL 97 97 105   CO2 24 27 19*   * 251* 172*   BUN 46* 56* 32*   CREATININE 5.4* 5.4* 3.9*   CALCIUM 7.7* 8.0* 8.6*   PROT  --  7.1 7.2   ALBUMIN  --  2.6* 2.7*   BILITOT  --  1.0 1.2*   ALKPHOS  --  200* 166*   AST  --  16 17   ALT  --  10 9*   ANIONGAP 15 8 11       Significant Imaging: I have reviewed all pertinent imaging results/findings within the past 24 hours.

## 2023-12-05 NOTE — DISCHARGE INSTRUCTIONS
Diabetes plan:    - Continue Levemir 8 units daily  - Fiasp (Aspart) SSI, PRN TIDWM (150/50)   -- Blood sugar 150 to 200 add 1 units   -- Blood sugar 201 to 250 add 2 units   -- Blood sugar 251 to 300 add 3 units   -- Blood sugar 301 to 350 add 4 units   -- Blood sugar greater than 350 add 5 units    Outpatient follow up requested with primary care and endocrine

## 2023-12-05 NOTE — ASSESSMENT & PLAN NOTE
Last A1c:   Lab Results   Component Value Date    HGBA1C 10.5 (H) 12/02/2023      Home regimen:  Detemir 8 q.h.s.    Current inpatient regimen:  Antihyperglycemics (From admission, onward)      Start     Stop Route Frequency Ordered    12/03/23 2100  insulin detemir U-100 (Levemir) pen 8 Units         -- SubQ Nightly 12/02/23 2251    12/02/23 2349  insulin aspart U-100 pen 0-5 Units         -- SubQ Before meals & nightly PRN 12/02/23 2251          - continue detemir 8 q.h.s. plus low-dose SSI      Plan:  - Diabetic diet  - POCT glucose ACHS  - Will monitor glucose results and adjust insulin regimen accordingly  - needs outpatient follow-up with endocrine or PCP, desires dexcom    Blood Sugars (AccuCheck):    Recent Labs     12/04/23  1158 12/04/23  1600 12/04/23  2009 12/04/23  2051 12/05/23  0801 12/05/23  1155   POCTGLUCOSE 169* 185* 183* 200* 189* 228*       - Endocrine consulted

## 2023-12-05 NOTE — PLAN OF CARE
Pt AAOx4. Respirations are even and unlabored on RA. IV and HD site CDI. Blood glucose monitored ACHS. Frequent weight shifting encourage. Temp monitored. Safety maintained.     Problem: Skin Injury Risk Increased  Goal: Skin Health and Integrity  Outcome: Ongoing, Progressing     Problem: Adult Inpatient Plan of Care  Goal: Plan of Care Review  Outcome: Ongoing, Progressing     Problem: Device-Related Complication Risk (Hemodialysis)  Goal: Safe, Effective Therapy Delivery  Outcome: Ongoing, Progressing     Problem: Diabetes Comorbidity  Goal: Blood Glucose Level Within Targeted Range  Outcome: Ongoing, Progressing     Problem: Fall Injury Risk  Goal: Absence of Fall and Fall-Related Injury  Outcome: Ongoing, Progressing

## 2023-12-05 NOTE — SUBJECTIVE & OBJECTIVE
Interval HPI:   No acute events overnight. Patient in room 29231/12528 A. Blood glucose stable. BG at and above goal on current insulin regimen (SSI and basal). Steroid use- None       Renal function- Abnormal - Cr 3.9    Vasopressors-  None     Diet diabetic Renal; 1500 Calorie; Fluid - 1500mL     Eating:   <25%  Nausea: No  Hypoglycemia and intervention: No  Fever: No  TPN and/or TF: No    PMH, PSH, FH, SH updated and reviewed     ROS:    Review of Systems   Constitutional:  Negative for unexpected weight change.   Gastrointestinal:  Negative for constipation, diarrhea, nausea and vomiting.   Endocrine: Negative for polydipsia and polyuria.       Current Medications and/or Treatments Impacting Glycemic Control  Immunotherapy:    Immunosuppressants       None          Steroids:   Hormones (From admission, onward)      Start     Stop Route Frequency Ordered    12/02/23 2251  melatonin tablet 6 mg         -- Oral Nightly PRN 12/02/23 2251          Pressors:    Autonomic Drugs (From admission, onward)      None          Hyperglycemia/Diabetes Medications:   Antihyperglycemics (From admission, onward)      Start     Stop Route Frequency Ordered    12/03/23 2100  insulin detemir U-100 (Levemir) pen 8 Units         -- SubQ Nightly 12/02/23 2251 12/02/23 2349  insulin aspart U-100 pen 0-5 Units         -- SubQ Before meals & nightly PRN 12/02/23 2251             PHYSICAL EXAMINATION:  Vitals:    12/05/23 1112   BP:    Pulse: 93   Resp:    Temp:      Body mass index is 30.27 kg/m².     Physical Exam  Constitutional:       General: She is not in acute distress.     Appearance: Normal appearance. She is not ill-appearing.   HENT:      Head: Normocephalic and atraumatic.      Right Ear: External ear normal.      Left Ear: External ear normal.      Nose: Nose normal.   Cardiovascular:      Rate and Rhythm: Normal rate and regular rhythm.   Pulmonary:      Effort: Pulmonary effort is normal. No respiratory distress.    Neurological:      General: No focal deficit present.      Mental Status: She is alert and oriented to person, place, and time.   Psychiatric:         Mood and Affect: Mood normal.         Behavior: Behavior normal.

## 2023-12-05 NOTE — ASSESSMENT & PLAN NOTE
Patient with known CAD s/p CABG, which is controlled Will continue Plavix and Statin and monitor for S/Sx of angina/ACS. Continue to monitor on telemetry.

## 2023-12-05 NOTE — ASSESSMENT & PLAN NOTE
Chronic, uncontrolled. Latest blood pressure and vitals reviewed-     Temp:  [98.4 °F (36.9 °C)-99 °F (37.2 °C)]   Pulse:  [90-98]   Resp:  [18-21]   BP: (124-173)/(62-73)   SpO2:  [94 %-100 %] .   Home meds for hypertension were reviewed and noted below.   Hypertension Medications               carvediloL (COREG) 6.25 MG tablet Take 1 tablet (6.25 mg total) by mouth 2 (two) times daily.    hydrALAZINE (APRESOLINE) 50 MG tablet Take 1 tablet (50 mg total) by mouth 3 (three) times daily.    isosorbide dinitrate (ISORDIL) 20 MG tablet Take 2 tablets (40 mg total) by mouth 3 (three) times daily.            While in the hospital, will manage blood pressure as follows; Continue home antihypertensive regimen.  Will hold steady for tonight and adjust in morning if uncontrolled overnight.    Will utilize p.r.n. blood pressure medication only if patient's blood pressure greater than 160/100 and she develops symptoms such as worsening chest pain or shortness of breath.

## 2023-12-05 NOTE — ASSESSMENT & PLAN NOTE
Patient is a 58-year-old woman with past medical history of ESRD Monday Wednesday Friday, bilateral AKA, presenting for altered mental status and subsequently found to be hypoglycemic and hypothermic.  She was given p.o. and IV glucose with stabilization of her blood glucose and mental status.  She states that she is back to normal baseline and her  at bedside agrees however there is no clear explanation as to why she had this episode of hypothermia and hypoglycemia.  She denies any fevers or chills.  No lab work is suggestive source of infection and imaging is largely unremarkable. Antibiotics not started on admission due to low suspicion for sepsis and infection.     - Unexplained fever:  Hypothermia being treated as fever equivalent > no further episodes.  - Lactate within normal limits  - Urine culture with P mirabilis, CTX x3 doses

## 2023-12-06 ENCOUNTER — PATIENT OUTREACH (OUTPATIENT)
Dept: ADMINISTRATIVE | Facility: CLINIC | Age: 58
End: 2023-12-06
Payer: MEDICARE

## 2023-12-06 LAB
BACTERIA BLD CULT: ABNORMAL

## 2023-12-06 NOTE — PROGRESS NOTES
C3 nurse attempted to contact Suyapa Connelly for a TCC post hospital discharge follow up call. The patient's  is unable to conduct the call @ this time. The patient requested a callback.    The patient does not have a scheduled HOSFU appointment within 5-7 days post hospital discharge date 12/05/23. Message sent to Physician staff to assist with HOSFU appointment scheduling.

## 2023-12-06 NOTE — PLAN OF CARE
[facility] ([facility])  Discharge Final Note    Primary Care Provider: Magen Christensen MD    Expected Discharge Date: 12/5/2023    Final Discharge Note (most recent)       Final Note - 12/06/23 0820          Final Note    Assessment Type Final Discharge Note (P)      Anticipated Discharge Disposition Home or Self Care (P)         Post-Acute Status    Discharge Delays None known at this time (P)                      Important Message from Medicare    Pt d/c'd to home.    TAINA TorresN, BS, RN, CCM

## 2023-12-06 NOTE — PROGRESS NOTES
2nd attempt to make TCC Call. Left voicemail. Please call [phone] leave your first and last name and date of birth for Luis Manuel. I will return your call.

## 2023-12-07 ENCOUNTER — EPISODE CHANGES (OUTPATIENT)
Dept: CARDIOLOGY | Facility: CLINIC | Age: 58
End: 2023-12-07

## 2023-12-07 NOTE — PROGRESS NOTES
3rd attempt for TCC Call; Left voicemail. Please call [phone] please leave first and last name and  for Luis Manuel. We will return your call.

## 2023-12-08 LAB — BACTERIA BLD CULT: NORMAL

## 2023-12-09 NOTE — DISCHARGE SUMMARY
[facility] - Intensive Care ([place] [id number])  [facility]  Discharge Summary      Patient Name: Suyapa Connelly  MRN: [id number]  CHAVEZ: [id number]  Patient Class: OP- Observation  Admission Date: 12/2/2023  Hospital Length of Stay: 0 days  Discharge Date and Time: 12/5/2023  7:00 PM  Attending Physician: Marguerite att. providers found   Discharging Provider: Dixon Prakash MD  Primary Care Provider: Magen Christensen MD  [facility] Team: [facility] Dixon Prakash MD  Primary Care Team: [facility]    HPI:   Patient is a 58-year-old female with past medical history significant for hypertension, hyperlipidemia, ESRD Monday Wednesday Friday, bilateral lower extremity AKAs who is presenting with acute altered mental status during her dialysis.  She was on the dialysis for approximately 2 hours when she started to become progressively more fatigued and then eventually became unresponsive.  They stopped dialysis and called for ambulance to take her for emergency evaluation.  They did not check her sugars.  Around arrival to the emergency department broad labs were sent off which identified hypoglycemia in the 30s her which she was treated with oral glucose.  She did not initially improve and so she was given IV dextrose which resulted in her normalizing.  Her  states that she is not a regular eater and often will not eat as much in the hospital as when she is out and about and seems to have highly variable intake.  He states that she had not eaten breakfast this morning and taken her normal dose of insulin and he believes that that is why she had the episode.  The  expressed frustration at having issues with her sugars in the past.  They want to establish with endocrinology to see if there is a better way for them to manage her sugars.    In the emergency department, her vitals were found to be hypothermic and this was confirmed with a rectal temperature.  She has a history of AFib which is  borderline tachycardic (although history of AFib) and she was placed on a Daniel Hugger.  No empiric antibiotics were given and no cultures were collected.  She denies any obvious signs or symptoms suggestive of a source and her chest x-ray is suggestive of vascular congestion but no overall consolidation.  They deny any home fevers, sick contacts, dysuria or urinary CV symptoms.  She continues to urinate and was recently increased to furosemide 40 mg b.i.d..  She denies any cough or chest pain or shortness of breath.      * No surgery found *      Hospital Course:   Patient admitted for hypoglycemia (insulin use w/o eating) and hypothermia. No evidence of infection on presentation so antibiotics were not initiated. Patinet vital signs have been stable since admission.  is very concerned about patient's state of health.     Heart failure - Per , patient has been having difficulty sleeping and using many pillows at night. Patient denies changes in sleep patterns or difficult breathing. Previously, patient would have peripheral edema but since bilateral AKA that no longer occurs. Patient was referred to cardiology for HFrEF (15%), records show no-show in September 2023.  Cardiology consulted and patient discharged with outpatient follow-up.    Diabetes -  reports that patient will restrict her diet because of the limited insulin she has been prescribed. Reports that  shares his insulin with patient if needed. Has been referred to endocrinology in the past but it does not appear appointment was ever scheduled. Discussed in detail that insulin titration is done in the outpatient setting for the best results. As  has noted that patient does not eat much when in the hospital, regimen IP is often not adequate at home.  Endocrine consult and patient discharged with home basal and newly added sliding scale insulin.  Outpatient follow-up requested.     Goals of Care Treatment  Preferences:  Code Status: Full Code      Consults:   Consults (From admission, onward)          Status Ordering Provider     Inpatient consult to Endocrinology  Once        Provider:  (Not yet assigned)    Completed ERIN LANCASTER     [facility]  Once        Provider:  (Not yet assigned)    Completed SHANNAN LAWRENCE     Inpatient consult to Cardiology  Once        Provider:  (Not yet assigned)    Completed SHANNAN LAWRENCE     Inpatient consult to Nephrology  Once        Provider:  (Not yet assigned)    Completed VANESSA PENNINGTON            No new Assessment & Plan notes have been filed under this hospital service since the last note was generated.  Service: Hospital Medicine    Final Active Diagnoses:    Diagnosis Date Noted POA    PRINCIPAL PROBLEM:  Hypothermia [T68.XXXA] 12/02/2023 Yes    Type 2 diabetes mellitus with hyperglycemia, with long-term current use of insulin [E11.65, Z79.4] 01/02/2020 Not Applicable    Hypoglycemia [E16.2] 12/02/2023 Yes    Severe obesity (BMI 35.0-39.9) with comorbidity [E66.01] 05/30/2023 Yes    Chronic kidney disease-mineral and bone disorder [N18.9, E83.9, M89.9] 05/24/2023 Yes    ESRD on dialysis [N18.6, Z99.2] 04/21/2023 Not Applicable    S/P AKA (above knee amputation) bilateral [Z89.611, Z89.612] 03/26/2022 Not Applicable    Debility [R53.81] 02/12/2022 Yes    Chronic combined systolic and diastolic heart failure [I50.42] 06/21/2021 Yes    Paroxysmal atrial fibrillation [I48.0] 01/28/2020 Yes    S/P CABG x 1 [Z95.1] 01/27/2020 Not Applicable    CAD (coronary artery disease) [I25.10] 01/02/2020 Yes    Essential hypertension [I10] 05/05/2018 Yes      Problems Resolved During this Admission:       Discharged Condition: good    Disposition: Home or Self Care    Follow Up:    Patient Instructions:      Ambulatory referral/consult to Internal Medicine   Standing Status: Future   Referral Priority: Routine Referral Type: Consultation   Referral Reason:  Specialty Services Required   Requested Specialty: Internal Medicine   Number of Visits Requested: 1     Ambulatory referral/consult to Endocrinology   Standing Status: Future   Referral Priority: Routine Referral Type: Consultation   Requested Specialty: Endocrinology   Number of Visits Requested: 1     ACCEPT - Ambulatory referral/consult to [facility]   Standing Status: Future   Referral Priority: Routine Referral Type: Consultation   Referral Reason: Specialty Services Required   Requested Specialty: Cardiology   Number of Visits Requested: 1     Diet diabetic     Notify your health care provider if you experience any of the following:  increased confusion or weakness     Notify your health care provider if you experience any of the following:  persistent dizziness, light-headedness, or visual disturbances     Notify your health care provider if you experience any of the following:  worsening rash     Notify your health care provider if you experience any of the following:  severe persistent headache     Notify your health care provider if you experience any of the following:  difficulty breathing or increased cough     Notify your health care provider if you experience any of the following:  redness, tenderness, or signs of infection (pain, swelling, redness, odor or green/yellow discharge around incision site)     Notify your health care provider if you experience any of the following:  severe uncontrolled pain     Notify your health care provider if you experience any of the following:  persistent nausea and vomiting or diarrhea     Notify your health care provider if you experience any of the following:  temperature >100.4     Activity as tolerated       Significant Diagnostic Studies: N/A    Pending Diagnostic Studies:       None           Medications:  Reconciled Home Medications:      Medication List        START taking these medications      ACCU-CHEK GUIDE ME GLUCOSE MTR  "Misc  Generic drug: blood-glucose meter  To check BG 3 times daily, to use with insurance preferred meter     ACCU-CHEK GUIDE TEST STRIPS Strp  Generic drug: blood sugar diagnostic  To check BG 3 times daily, to use with insurance preferred meter     ACCU-CHEK SOFTCLIX LANCETS Misc  Generic drug: lancets  To check BG 3 times daily, to use with insurance preferred meter     FIASP FLEXTOUCH U-100 INSULIN 100 unit/mL (3 mL) Inpn  Generic drug: insulin aspart (niacinamide)  Inject 1-5 Units into the skin before meals as needed (Per sliding scale). Blood sugar 150 to 200 add 1 units.  Blood sugar 201 to 250 add 2 units.  Blood sugar 251 to 300 add 3 units  Blood sugar 301 to 350 add 4 units.  Blood sugar greater than 350 add 5 units.            CHANGE how you take these medications      BD MIGUEL 2ND GEN PEN NEEDLE 32 gauge x 5/32" Ndle  Generic drug: pen needle, diabetic  1 Units by Misc.(Non-Drug; Combo Route) route before meals as needed (SSI).  What changed:   how much to take  how to take this  when to take this  reasons to take this            CONTINUE taking these medications      allopurinoL 100 MG tablet  Commonly known as: ZYLOPRIM  Take 0.5 tablets (50 mg total) by mouth every other day.     apixaban 5 mg Tab  Commonly known as: ELIQUIS  Take 1 tablet (5 mg total) by mouth 2 (two) times daily.     atorvastatin 80 MG tablet  Commonly known as: LIPITOR  Take 1 tablet (80 mg total) by mouth once daily.     calcitRIOL 0.5 MCG Cap  Commonly known as: ROCALTROL  Take 1 capsule (0.5 mcg total) by mouth once daily.     calcium acetate(phosphat bind) 667 mg capsule  Commonly known as: PHOSLO  Take 1 capsule (667 mg total) by mouth 3 (three) times daily with meals.     carvediloL 6.25 MG tablet  Commonly known as: COREG  Take 1 tablet (6.25 mg total) by mouth 2 (two) times daily.     clopidogreL 75 mg tablet  Commonly known as: PLAVIX  Take 1 tablet (75 mg total) by mouth once daily.     hydrALAZINE 50 MG tablet  Commonly " known as: APRESOLINE  Take 1 tablet (50 mg total) by mouth 3 (three) times daily.     insulin detemir U-100 (Levemir) 100 unit/mL (3 mL) Inpn pen  Inject 8 Units into the skin every evening.     isosorbide dinitrate 20 MG tablet  Commonly known as: ISORDIL  Take 2 tablets (40 mg total) by mouth 3 (three) times daily.     sertraline 50 MG tablet  Commonly known as: ZOLOFT  Take 1 tablet (50 mg total) by mouth once daily.     sodium bicarbonate 650 MG tablet  Take 650 mg by mouth 3 (three) times daily.              Indwelling Lines/Drains at time of discharge:   Lines/Drains/Airways       Central Venous Catheter Line  Duration                  Hemodialysis Catheter 05/25/23 1336 left internal jugular 197 days                    Time spent on the discharge of patient: 35 minutes         Dixon Prakash MD  Department of Hospital Medicine  [facility] - Intensive Care ([place])

## 2023-12-11 ENCOUNTER — TELEPHONE (OUTPATIENT)
Dept: HEPATOLOGY | Facility: HOSPITAL | Age: 58
End: 2023-12-11
Payer: MEDICARE

## 2023-12-11 DIAGNOSIS — B94.8 SEQUELA OF CORYNEBACTERIUM INFECTION: Primary | ICD-10-CM

## 2023-12-11 DIAGNOSIS — R78.81 BACTEREMIA: ICD-10-CM

## 2023-12-11 NOTE — TELEPHONE ENCOUNTER
Attempted to contact patient using numbers in the chart for both patient and patient's . The patient's number led to a wrong contact and the patient's 's number was disconnected. Attempted to contact regarding corynebacterium bacteremia. Will route conversation to PCP Dr. Christensen. Will need to follow up with next provider to ensure bacterial clearance.

## 2023-12-13 ENCOUNTER — HOSPITAL ENCOUNTER (EMERGENCY)
Facility: HOSPITAL | Age: 58
Discharge: LEFT BEFORE DISCHARGE | End: 2023-12-13
Payer: MEDICARE

## 2023-12-13 VITALS
HEIGHT: 60 IN | SYSTOLIC BLOOD PRESSURE: 170 MMHG | WEIGHT: 181 LBS | DIASTOLIC BLOOD PRESSURE: 93 MMHG | BODY MASS INDEX: 35.53 KG/M2 | TEMPERATURE: 99 F | RESPIRATION RATE: 18 BRPM | HEART RATE: 96 BPM | OXYGEN SATURATION: 96 %

## 2023-12-13 LAB
ALBUMIN SERPL BCP-MCNC: 3.4 G/DL (ref 3.5–5.2)
ALP SERPL-CCNC: 257 U/L (ref 55–135)
ALT SERPL W/O P-5'-P-CCNC: 14 U/L (ref 10–44)
ANION GAP SERPL CALC-SCNC: 9 MMOL/L (ref 8–16)
AST SERPL-CCNC: 23 U/L (ref 10–40)
BASOPHILS # BLD AUTO: 0.07 K/UL (ref 0–0.2)
BASOPHILS NFR BLD: 1.3 % (ref 0–1.9)
BILIRUB SERPL-MCNC: 1.1 MG/DL (ref 0.1–1)
BUN SERPL-MCNC: 55 MG/DL (ref 6–20)
CALCIUM SERPL-MCNC: 8.6 MG/DL (ref 8.7–10.5)
CHLORIDE SERPL-SCNC: 93 MMOL/L (ref 95–110)
CO2 SERPL-SCNC: 29 MMOL/L (ref 23–29)
CREAT SERPL-MCNC: 5.3 MG/DL (ref 0.5–1.4)
DIFFERENTIAL METHOD BLD: ABNORMAL
EOSINOPHIL # BLD AUTO: 0.3 K/UL (ref 0–0.5)
EOSINOPHIL NFR BLD: 5.7 % (ref 0–8)
ERYTHROCYTE [DISTWIDTH] IN BLOOD BY AUTOMATED COUNT: 18.1 % (ref 11.5–14.5)
EST. GFR  (NO RACE VARIABLE): 8.9 ML/MIN/1.73 M^2
GLUCOSE SERPL-MCNC: 308 MG/DL (ref 70–110)
HCT VFR BLD AUTO: 33.9 % (ref 37–48.5)
HGB BLD-MCNC: 10.9 G/DL (ref 12–16)
IMM GRANULOCYTES # BLD AUTO: 0.01 K/UL (ref 0–0.04)
IMM GRANULOCYTES NFR BLD AUTO: 0.2 % (ref 0–0.5)
LYMPHOCYTES # BLD AUTO: 1 K/UL (ref 1–4.8)
LYMPHOCYTES NFR BLD: 19.1 % (ref 18–48)
MCH RBC QN AUTO: 31.1 PG (ref 27–31)
MCHC RBC AUTO-ENTMCNC: 32.2 G/DL (ref 32–36)
MCV RBC AUTO: 97 FL (ref 82–98)
MONOCYTES # BLD AUTO: 0.6 K/UL (ref 0.3–1)
MONOCYTES NFR BLD: 11.7 % (ref 4–15)
NEUTROPHILS # BLD AUTO: 3.2 K/UL (ref 1.8–7.7)
NEUTROPHILS NFR BLD: 62 % (ref 38–73)
NRBC BLD-RTO: 0 /100 WBC
PLATELET # BLD AUTO: 221 K/UL (ref 150–450)
PMV BLD AUTO: 10.4 FL (ref 9.2–12.9)
POTASSIUM SERPL-SCNC: 4.3 MMOL/L (ref 3.5–5.1)
PROT SERPL-MCNC: 7.9 G/DL (ref 6–8.4)
RBC # BLD AUTO: 3.51 M/UL (ref 4–5.4)
SODIUM SERPL-SCNC: 131 MMOL/L (ref 136–145)
WBC # BLD AUTO: 5.23 K/UL (ref 3.9–12.7)

## 2023-12-13 PROCEDURE — 85025 COMPLETE CBC W/AUTO DIFF WBC: CPT | Performed by: PHYSICIAN ASSISTANT

## 2023-12-13 PROCEDURE — 80053 COMPREHEN METABOLIC PANEL: CPT | Performed by: PHYSICIAN ASSISTANT

## 2023-12-13 PROCEDURE — 99281 EMR DPT VST MAYX REQ PHY/QHP: CPT

## 2023-12-14 NOTE — FIRST PROVIDER EVALUATION
Emergency Department TeleTriage Encounter Note      CHIEF COMPLAINT    Chief Complaint   Patient presents with    Fluid retention     Patient states that she was scheduled for dialysis today. She states that she missed it due to other appointment and was told to come to the ER for dialysis       VITAL SIGNS   Initial Vitals [12/13/23 1901]   BP Pulse Resp Temp SpO2   (!) 170/93 96 18 98.5 °F (36.9 °C) 96 %      MAP       --            ALLERGIES    Review of patient's allergies indicates:   Allergen Reactions    Ciprofloxacin Itching    Contrast media      Kidney injury    Iodine      Kidney injury       PROVIDER TRIAGE NOTE  Patient missed dialysis today because of an appointment. She denies any symptoms and was last dialyzed Monday.       ORDERS  Labs Reviewed - No data to display    ED Orders (720h ago, onward)      None              Virtual Visit Note: The provider triage portion of this emergency department evaluation and documentation was performed via [employer], a HIPAA-compliant telemedicine application, in concert with a tele-presenter in the room. A face to face patient evaluation with one of my colleagues will occur once the patient is placed in an emergency department room.      DISCLAIMER: This note was prepared with [employer] voice recognition transcription software. Garbled syntax, mangled pronouns, and other bizarre constructions may be attributed to that software system.

## 2023-12-21 RX ORDER — SODIUM BICARBONATE 650 MG/1
650 TABLET ORAL 3 TIMES DAILY
Qty: 90 TABLET | Refills: 0 | Status: SHIPPED | OUTPATIENT
Start: 2023-12-21 | End: 2024-02-28

## 2023-12-21 NOTE — TELEPHONE ENCOUNTER
----- Message from Maryann Ferguson sent at 12/21/2023 10:31 AM CST -----  Contact: pt ba Mejias  Type:  RX Refill Request    Who Called: Randell    Refill or New Rx:refill    RX Name and Strength:sodium bicarbonate 650 MG tablet    How is the patient currently taking it? (ex. 1XDay):as directed    Is this a 30 day or 90 day RX:30    Preferred Pharmacy with phone number:  [facility]  [address]  [facility] [address]  [phone]    Local or Mail Order:local    Ordering Provider:Patrick    Would the patient rather a call back or a response via [facility]? Call back    Best Call Back Number:[phone]    Additional Information: pt is OUT of this medication and hoping to get some to tide her over till her 12/26 appt    Please call to advise  Thanks

## 2023-12-26 ENCOUNTER — OFFICE VISIT (OUTPATIENT)
Dept: PRIMARY CARE CLINIC | Facility: CLINIC | Age: 58
End: 2023-12-26
Payer: MEDICARE

## 2023-12-26 VITALS
SYSTOLIC BLOOD PRESSURE: 138 MMHG | OXYGEN SATURATION: 97 % | DIASTOLIC BLOOD PRESSURE: 70 MMHG | RESPIRATION RATE: 18 BRPM | HEART RATE: 93 BPM

## 2023-12-26 DIAGNOSIS — I50.42 CHRONIC COMBINED SYSTOLIC AND DIASTOLIC HEART FAILURE: ICD-10-CM

## 2023-12-26 DIAGNOSIS — F41.1 GAD (GENERALIZED ANXIETY DISORDER): ICD-10-CM

## 2023-12-26 DIAGNOSIS — G93.41 ACUTE METABOLIC ENCEPHALOPATHY: ICD-10-CM

## 2023-12-26 DIAGNOSIS — F32.A DEPRESSION, UNSPECIFIED DEPRESSION TYPE: ICD-10-CM

## 2023-12-26 DIAGNOSIS — Z95.1 S/P CABG X 1: ICD-10-CM

## 2023-12-26 DIAGNOSIS — E11.65 TYPE 2 DIABETES MELLITUS WITH HYPERGLYCEMIA, WITH LONG-TERM CURRENT USE OF INSULIN: Primary | ICD-10-CM

## 2023-12-26 DIAGNOSIS — Z79.4 TYPE 2 DIABETES MELLITUS WITH HYPERGLYCEMIA, WITH LONG-TERM CURRENT USE OF INSULIN: Primary | ICD-10-CM

## 2023-12-26 DIAGNOSIS — I25.10 CORONARY ARTERY DISEASE, UNSPECIFIED VESSEL OR LESION TYPE, UNSPECIFIED WHETHER ANGINA PRESENT, UNSPECIFIED WHETHER NATIVE OR TRANSPLANTED HEART: ICD-10-CM

## 2023-12-26 DIAGNOSIS — N18.6 ESRD (END STAGE RENAL DISEASE): ICD-10-CM

## 2023-12-26 PROCEDURE — 3066F NEPHROPATHY DOC TX: CPT | Mod: CPTII,S$GLB,, | Performed by: PHYSICIAN ASSISTANT

## 2023-12-26 PROCEDURE — 3046F PR MOST RECENT HEMOGLOBIN A1C LEVEL > 9.0%: ICD-10-PCS | Mod: CPTII,S$GLB,, | Performed by: PHYSICIAN ASSISTANT

## 2023-12-26 PROCEDURE — 3046F HEMOGLOBIN A1C LEVEL >9.0%: CPT | Mod: CPTII,S$GLB,, | Performed by: PHYSICIAN ASSISTANT

## 2023-12-26 PROCEDURE — 99214 PR OFFICE/OUTPT VISIT, EST, LEVL IV, 30-39 MIN: ICD-10-PCS | Mod: S$GLB,,, | Performed by: PHYSICIAN ASSISTANT

## 2023-12-26 PROCEDURE — 3078F PR MOST RECENT DIASTOLIC BLOOD PRESSURE < 80 MM HG: ICD-10-PCS | Mod: CPTII,S$GLB,, | Performed by: PHYSICIAN ASSISTANT

## 2023-12-26 PROCEDURE — 3078F DIAST BP <80 MM HG: CPT | Mod: CPTII,S$GLB,, | Performed by: PHYSICIAN ASSISTANT

## 2023-12-26 PROCEDURE — 3075F SYST BP GE 130 - 139MM HG: CPT | Mod: CPTII,S$GLB,, | Performed by: PHYSICIAN ASSISTANT

## 2023-12-26 PROCEDURE — 3066F PR DOCUMENTATION OF TREATMENT FOR NEPHROPATHY: ICD-10-PCS | Mod: CPTII,S$GLB,, | Performed by: PHYSICIAN ASSISTANT

## 2023-12-26 PROCEDURE — 99214 OFFICE O/P EST MOD 30 MIN: CPT | Mod: S$GLB,,, | Performed by: PHYSICIAN ASSISTANT

## 2023-12-26 PROCEDURE — 3075F PR MOST RECENT SYSTOLIC BLOOD PRESS GE 130-139MM HG: ICD-10-PCS | Mod: CPTII,S$GLB,, | Performed by: PHYSICIAN ASSISTANT

## 2023-12-26 RX ORDER — ALLOPURINOL 100 MG/1
50 TABLET ORAL EVERY OTHER DAY
Qty: 8 TABLET | Refills: 11 | Status: SHIPPED | OUTPATIENT
Start: 2023-12-26 | End: 2024-12-20

## 2023-12-26 RX ORDER — CARVEDILOL 6.25 MG/1
6.25 TABLET ORAL 2 TIMES DAILY
Qty: 180 TABLET | Refills: 3 | Status: SHIPPED | OUTPATIENT
Start: 2023-12-26 | End: 2024-12-25

## 2023-12-26 RX ORDER — SEVELAMER CARBONATE 800 MG/1
1600 TABLET, FILM COATED ORAL 3 TIMES DAILY
Qty: 270 TABLET | Refills: 3 | Status: SHIPPED | OUTPATIENT
Start: 2023-12-26

## 2023-12-26 RX ORDER — CALCIUM ACETATE 667 MG/1
667 CAPSULE ORAL
Qty: 90 CAPSULE | Refills: 11 | Status: SHIPPED | OUTPATIENT
Start: 2023-12-26 | End: 2024-12-25

## 2023-12-26 RX ORDER — HYDRALAZINE HYDROCHLORIDE 50 MG/1
50 TABLET, FILM COATED ORAL 3 TIMES DAILY
Qty: 270 TABLET | Refills: 3 | Status: SHIPPED | OUTPATIENT
Start: 2023-12-26 | End: 2024-12-25

## 2023-12-26 RX ORDER — TRAZODONE HYDROCHLORIDE 50 MG/1
25 TABLET ORAL NIGHTLY
Qty: 15 TABLET | Refills: 11 | Status: SHIPPED | OUTPATIENT
Start: 2023-12-26 | End: 2024-12-25

## 2023-12-26 RX ORDER — CLOPIDOGREL BISULFATE 75 MG/1
75 TABLET ORAL DAILY
Qty: 90 TABLET | Refills: 3 | Status: SHIPPED | OUTPATIENT
Start: 2023-12-26 | End: 2024-12-25

## 2023-12-26 RX ORDER — HYDROXYZINE HYDROCHLORIDE 25 MG/1
25 TABLET, FILM COATED ORAL 3 TIMES DAILY
Qty: 90 TABLET | Refills: 11 | Status: ON HOLD | OUTPATIENT
Start: 2023-12-26 | End: 2024-02-07 | Stop reason: HOSPADM

## 2023-12-26 RX ORDER — FAMOTIDINE 20 MG/1
20 TABLET, FILM COATED ORAL 2 TIMES DAILY
Qty: 180 TABLET | Refills: 3 | Status: SHIPPED | OUTPATIENT
Start: 2023-12-26

## 2023-12-26 RX ORDER — CALCITRIOL 0.5 UG/1
0.5 CAPSULE ORAL DAILY
Qty: 90 CAPSULE | Refills: 3 | Status: SHIPPED | OUTPATIENT
Start: 2023-12-26 | End: 2024-12-25

## 2023-12-26 RX ORDER — SERTRALINE HYDROCHLORIDE 100 MG/1
100 TABLET, FILM COATED ORAL DAILY
Qty: 90 TABLET | Refills: 3 | Status: SHIPPED | OUTPATIENT
Start: 2023-12-26 | End: 2024-12-25

## 2023-12-26 RX ORDER — FUROSEMIDE 40 MG/1
40 TABLET ORAL 2 TIMES DAILY
Qty: 180 TABLET | Refills: 1 | Status: SHIPPED | OUTPATIENT
Start: 2023-12-26

## 2023-12-26 RX ORDER — ATORVASTATIN CALCIUM 80 MG/1
80 TABLET, FILM COATED ORAL DAILY
Qty: 90 TABLET | Refills: 3 | Status: SHIPPED | OUTPATIENT
Start: 2023-12-26 | End: 2024-12-25

## 2023-12-26 RX ORDER — PREGABALIN 100 MG/1
100 CAPSULE ORAL 2 TIMES DAILY
Qty: 60 CAPSULE | Refills: 6 | Status: ON HOLD | OUTPATIENT
Start: 2023-12-26 | End: 2024-02-07 | Stop reason: HOSPADM

## 2023-12-26 RX ORDER — ISOSORBIDE DINITRATE 20 MG/1
40 TABLET ORAL 3 TIMES DAILY
Qty: 180 TABLET | Refills: 11 | Status: SHIPPED | OUTPATIENT
Start: 2023-12-26 | End: 2024-12-25

## 2023-12-26 RX ORDER — INSULIN ASPART INJECTION 100 [IU]/ML
1-5 INJECTION, SOLUTION SUBCUTANEOUS
Qty: 3 PEN | Refills: 3 | Status: SHIPPED | OUTPATIENT
Start: 2023-12-26 | End: 2024-12-25

## 2023-12-26 NOTE — PATIENT INSTRUCTIONS
Gabe Dale,     If you are due for any health screening(s) below please notify me so we can arrange them to be ordered and scheduled. Most healthy patients at your age complete them, but you are free to accept or refuse.     If you can't do it, I'll definitely understand. If you can, I'd certainly appreciate it!    Tests to Keep You Healthy    Mammogram: ORDERED BUT NOT SCHEDULED  Eye Exam: DUE  Colon Cancer Screening: DUE  Cervical Cancer Screening: DUE  Last HbA1c < 8 (12/02/2023): NO      Schedule your breast cancer screening today     Breast cancer is the second most common cancer in women,  and the second leading cause of death from cancer. Mammograms can detect breast cancer early, which significantly increases the chances of curing the cancer.       Our records indicate that you may be overdue for breast cancer screening. Cancer screenings save lives, so schedule yours today to stay healthy.     If you recently had a mammogram performed outside of [facility], please let your Health care team know so that they can update your health record.        Its time for your colon cancer screening     Colorectal cancer is one of the leading causes of cancer death for men and women but it doesnt have to be. Screenings can prevent colorectal cancer or find it early enough to treat and cure the disease.     Our records indicate that you may be overdue for colon cancer screening. A colonoscopy or stool screening test can help identify patients at risk for developing colon cancer. Cancer screenings save lives, so schedule yours today to stay healthy.     A colonoscopy is the preferred test for detecting colon cancer. It is needed only once every 10 years if results are negative. While you are sedated, a flexible, lighted tube with a tiny camera is inserted into the rectum and advanced through the colon to look for cancers.     An alternative screening test that is used at home and returned to the lab may also be  used. It detects hidden blood in bowel movements which could indicate cancer in the colon. If results are positive, you will need a colonoscopy to determine if the blood is a sign of cancer. This type of follow up (diagnostic) colonoscopy usually requires additional copays as required by your insurance provider.     If you recently had your colon cancer screening performed outside of [facility], please let your Health care team know so that they can update your health record. Please contact your PCP if you have any questions.    Your cervical cancer screening is due     Our records indicate that you may be overdue for your screening Pap smear. A Pap smear is an important health screening that can detect abnormal cells that can become cervical cancer. Cervical cancer screenings allow for early diagnosis and increase the likelihood of successful treatment.     The current recommendation for Pap smear screening is every 3-5 years for women at average risk. We encourage you to schedule your appointment with your [facility] health provider. Many women see a gynecologist for this screening, but some primary care providers also provide Pap screening.     If you recently had your Pap smear screening performed outside of [facility], please let your health care team know so that they can update your health record.      Lets manage your A1c levels     Your last hemoglobin A1c was higher than the goal of less than 8. Hemoglobin A1c or HbA1c is a blood test that measures your average blood sugar levels over the past 3 months. It is the main test to help you and your health care team manage your diabetes.     Higher A1c levels are linked to diabetes complications, such as loss of vision, kidney disease, and nerve damage. Keeping your A1c at least less than 8 is important to stay healthy and we are here to help. Talk with your provider on how you can further improve your A1c.     We recommend that you set up  blood work to get a repeat hemoglobin A1c in 3 months to monitor your diabetes. Let your health care team know if you have questions.    Your diabetic retinal eye exam is due     Diabetes is the #1 cause of blindness in the [place] - early detection before signs or symptoms develop can prevent debilitating blindness.     Our records indicate that you may be overdue for your annual diabetic eye exam. Eye screening can help identify patients at risk for developing vision loss which is common in diabetes. This simple screening is an important step to keeping you healthy and preventing complications from diabetes.     This recommended diabetic eye exam should take place once per year and can prevent and treat diabetes complications in the eye before developing symptoms. This can be done with a special camera is used to take photographs of the back of your eye without having to dilate them, or you can see an eye doctor for a full dilated exam.     If you recently had your yearly diabetic eye exam performed outside of [facility], please let your Health care team know so that they can update your health record.

## 2023-12-26 NOTE — PROGRESS NOTES
Subjective     Patient ID: Suyapa Connelly is a 58 y.o. female.    Chief Complaint: Establish Care    Patient is a 57 yo female with multiple ongoing medically complex health issues. She was being follow up family meds, cards and nephrology. She is here with her  who gives most of the medical history.     Patient Active Problem List:     Peripheral vascular disease     Hypocalcemia     Essential hypertension     Vitamin D deficiency     CAROLIN (generalized anxiety disorder)     Type 2 diabetes mellitus with hyperglycemia, with long-term current use of insulin     CAD (coronary artery disease)     S/P CABG x 1     Anemia     Paroxysmal atrial fibrillation     Hyponatremia     S/P femoral-popliteal bypass surgery     Thrombosis of femoro-popliteal bypass graft     Impaired functional mobility and endurance     Nephrotic syndrome     Uremia     Vascular graft infection     Hypoalbuminemia     Chronic combined systolic and diastolic heart failure     Postmenopausal bleeding     Uterine fibroid     Muscle wasting and atrophy, not elsewhere classified, right thigh      Adjustment disorder with mixed anxiety and depressed mood     Phantom limb syndrome with pain     Peripheral neuropathic pain     Impaired eye movement     Myoclonus     Dermatitis associated with moisture     Uses self-applied continuous glucose monitoring device     S/P AKA (above knee amputation) bilateral     Hyperkalemia     Debility     Metabolic acidosis     ESRD (end stage renal disease)     Myalgia     Dependent on hemodialysis     Chronic kidney disease-mineral and bone disorder     Uremic encephalopathy     Acute metabolic encephalopathy     Severe obesity (BMI 35.0-39.9) with comorbidity     Hypoglycemia     Hypothermia     Depression    I REVIEWED PROBLEM LIST WITH PATIENT AND CARE GIVER.   NEEDS ALL MEDS FILLED AND SPECIALIST CONSULTS NEEDED.     Past Medical History:  No date: Anxiety  No date: Chronic pain syndrome  No date: CKD  (chronic kidney disease), stage III  No date: Depression  No date: Diabetes mellitus      Comment:  type 2  No date: Diabetes mellitus, type 2  No date: GERD (gastroesophageal reflux disease)  3/23/2021: Hyperemesis  3/23/2021: Hypokalemia  3/12/2022: Infection of below knee amputation stump  No date: Osteomyelitis  4/30/2021: Osteomyelitis of left foot  No date: Ulcer of left foot  No date: Vaginal delivery      Comment:  x1    Past Surgical History:  5/18/2021: ABOVE-KNEE AMPUTATION; Left      Comment:  Procedure: AMPUTATION, ABOVE KNEE;  Surgeon: Teddy Huber MD;  Location: [facility] OR [address] [place] [address];  Service:                Vascular;  Laterality: Left;  3/18/2022: ABOVE-KNEE AMPUTATION; Right      Comment:  Procedure: AMPUTATION, ABOVE KNEE;  Surgeon: DAYNE Florez II, MD;  Location: [facility] OR [address] [place] [address];  Service:                Vascular;  Laterality: Right;  7/9/15: Angiogram - Right Extremity; Right  10/6/15: angiogram-left leg  4/29/2021: ANGIOGRAPHY OF LOWER EXTREMITY; Left      Comment:  Procedure: ANGIOGRAM, LOWER EXTREMITY;  Surgeon: Teddy Huber MD;  Location: [facility] OR [address] [place] [address];  Service:                Vascular;  Laterality: Left;  12/28/2021: BELOW KNEE AMPUTATION OF LOWER EXTREMITY; Right      Comment:  Procedure: AMPUTATION, BELOW KNEE;  Surgeon: Kaitlyn Rojas MD;  Location: [facility];  Service: General;                 Laterality: Right;  12/18/2019: CATHETERIZATION OF BOTH LEFT AND RIGHT HEART; N/A      Comment:  Procedure: CATHETERIZATION, HEART, BOTH LEFT AND RIGHT;                Surgeon: Que Fernando III, MD;  Location: [facility] CATH               LAB;  Service: Cardiology;  Laterality: N/A;  12/18/2019: CORONARY ANGIOGRAPHY; N/A      Comment:  Procedure: ANGIOGRAM, CORONARY ARTERY;  Surgeon: Que Fernando III, MD;  Location: [facility] CATH LAB;  Service:                Cardiology;  Laterality: N/A;  7/28/2020:  CORONARY ANGIOGRAPHY INCLUDING BYPASS GRAFTS WITH   CATHETERIZATION OF LEFT HEART; N/A      Comment:  Procedure: ANGIOGRAM, CORONARY, INCLUDING BYPASS GRAFT,                WITH LEFT HEART CATHETERIZATION, 9 am;  Surgeon: Rachel Easley MD;  Location: [facility] CATH LAB;  Service:                Cardiology;  Laterality: N/A;  1/14/2020: CORONARY ARTERY BYPASS GRAFT (CABG); N/A      Comment:  Procedure: CORONARY ARTERY BYPASS GRAFT (CABG) x 1                    Off Pump;  Surgeon: Huang Altamirano MD;  Location:                [facility] OR [address] [place] [address];  Service: Cardiovascular;  Laterality:                N/A;  4/29/2021: CREATION OF FEMORAL-TIBIAL ARTERY BYPASS; Left      Comment:  Procedure: CREATION, BYPASS, ARTERIAL, FEMORAL TO                ANTERIOR TIBIAL;  Surgeon: Teddy Huber MD;                 Location: [facility] OR [address] [place] [address];  Service: Vascular;                 Laterality: Left;  8/18/2020: CREATION OF FEMOROPOPLITEAL ARTERIAL BYPASS USING GRAFT;   Left      Comment:  Procedure: CREATION, BYPASS, ARTERIAL, FEMORAL TO                POPLITEAL, USING GRAFT, LEFT LOWER EXTREMITY;  Surgeon:                Teddy Huber MD;  Location: [facility];  Service:                Vascular;  Laterality: Left;  REQUEST 7:15 A.M.                START----COVID NEGATIVE ON 8/171ST CASE STARTE PER                LEANA ON 8/7/2020 @ [id number] PREOP 8/12/2020                  T/S-----CLEARED BY CARDS-------PENDING INSURANCE  9/8/2020: DEBRIDEMENT OF FOOT; Left      Comment:  Procedure: DEBRIDEMENT, FOOT;  Surgeon: Rosio Mayes DPM;  Location: [facility];  Service: Podiatry;  Laterality:               Left;  request neoxx . RN Pre Op 9-4-2020, Covid                negative on 9/5/20. C A  3/4/2021: DEBRIDEMENT OF FOOT      Comment:  Procedure: DEBRIDEMENT, FOOT;  Surgeon: Teddy Huber MD;  Location: [facility] OR;  Service: Vascular;;  3/9/2021: DEBRIDEMENT OF FOOT; Left       Comment:  Procedure: DEBRIDEMENT, FOOT, bone biopsy;  Surgeon:                Rosio Mayes DPM;  Location: [facility] OR;  Service:                Podiatry;  Laterality: Left;  Request neoxx---COVID IN                AMREQUESTING NOON STARTRN Phone Pre op.On Blood                thinners Plavix and Eliquis.  Covid am of surgery. C A  5/4/2021: DEBRIDEMENT OF FOOT; Left      Comment:  Procedure: DEBRIDEMENT, FOOT;  Surgeon: Farooq Morley DPM;  Location: [facility] OR 2ND FLR;  Service:                Podiatry;  Laterality: Left;  1/27/2020: INSERTION OF TUNNELED CENTRAL VENOUS HEMODIALYSIS   CATHETER; N/A      Comment:  Procedure: Insertion, Catheter, Central Venous,                Hemodialysis;  Surgeon: ESTEBAN Gomez III, MD;                 Location: [facility] CATH LAB;  Service: Peripheral Vascular;                 Laterality: N/A;  5/10/2023: INSERTION OF TUNNELED CENTRAL VENOUS HEMODIALYSIS CATHETER      Comment:  Procedure: Insertion, Catheter, Central Venous,                Hemodialysis;  Surgeon: Romulo Queen MD;  Location:                [facility] CATH LAB;  Service: Cardiology;;  3/4/2021: PERCUTANEOUS TRANSLUMINAL ANGIOPLASTY; N/A      Comment:  Procedure: PTA (ANGIOPLASTY, PERCUTANEOUS,                TRANSLUMINAL);  Surgeon: Teddy Huber MD;                 Location: [facility] OR;  Service: Vascular;  Laterality: N/A;  5/27/2021: REMOVAL OF ARTERIOVENOUS GRAFT; Left      Comment:  Procedure: REMOVAL, GRAFT, LEFT LOWER EXTREMITY, WOUND                EXPLORATION;  Surgeon: Teddy Huber MD;                 Location: [facility] OR 2ND FLR;  Service: Vascular;                 Laterality: Left;  3/9/2021: REMOVAL OF NAIL OF DIGIT; Left      Comment:  Procedure: REMOVAL, NAIL, DIGIT;  Surgeon: Rosio Mayes DPM;  Location: [facility] OR;  Service: Podiatry;                 Laterality: Left;  5/10/2023: RIGHT HEART CATHETERIZATION; Right      Comment:  Procedure: INSERTION, CATHETER,  RIGHT HEART;  Surgeon:                Romulo Queen MD;  Location: [facility] CATH LAB;  Service:               Cardiology;  Laterality: Right;  3/4/2021: THROMBECTOMY; Left      Comment:  Procedure: THROMBECTOMY, LEFT LOWER EXTREMITY BYPASS                GRAFT, ANGIOGRAM, POSSIBLE INTERVENTION, POSSIBLE LEFT                LOWER EXTREMITY BYPASS;  Surgeon: Teddy Huber MD;  Location: [facility] OR;  Service: Vascular;  Laterality:                Left;  4/29/2021: THROMBECTOMY; Left      Comment:  Procedure: GRAFT THROMBECTOMY, LEFT LOWER EXTREMITY;                 Surgeon: Teddy Huber MD;  Location: [facility] OR 2ND                FLR;  Service: Vascular;  Laterality: Left;  14.5                [id number] ml dye  10/22/2021: THROMBECTOMY      Comment:  Procedure: THROMBECTOMY;  Surgeon: Saad Arenas MD;                 Location: [facility] CATH LAB/EP;  Service: Cardiology;;    Review of patient's family history indicates:  Problem: Diabetes      Relation: Mother          Age of Onset: (Not Specified)  Problem: Diabetes      Relation: Father          Age of Onset: (Not Specified)  Problem: Heart disease      Relation: Maternal Grandmother          Age of Onset: (Not Specified)  Problem: No Known Problems      Relation: Maternal Grandfather          Age of Onset: (Not Specified)  Problem: Diabetes      Relation: Paternal Grandmother          Age of Onset: (Not Specified)  Problem: No Known Problems      Relation: Paternal Grandfather          Age of Onset: (Not Specified)  Problem: Anesthesia problems      Relation: Neg Hx          Age of Onset: (Not Specified)      Social History    Socioeconomic History      Marital status:     Occupational History      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     Social Determinants of Health  Financial Resource Strain: Low Risk  (12/2/2023)      Overall Financial Resource Strain (CARDIA)          Difficulty of Paying Living Expenses: Not hard at all  Food Insecurity: No Food Insecurity (12/2/2023)      Hunger Vital Sign          Worried About Running Out of Food in the Last Year: Never true          Ran Out of Food in the Last Year: Never true  Transportation Needs: No Transportation Needs (12/2/2023)      PRAPARE - Transportation          Lack of Transportation (Medical): No          Lack of Transportation (Non-Medical): No  Physical Activity: Inactive (12/2/2023)      Exercise Vital Sign          Days of Exercise per Week: 0 days          Minutes of Exercise per Session: 0 min  Stress: Stress Concern Present (12/2/2023)      [employer] [place] [employer] - Occupational Stress Questionnaire          Feeling of Stress : Rather much  Social Connections: Moderately Isolated (12/2/2023)      Social Connection and Isolation Panel [NHANES]          Frequency of Communication with Friends and Family: More than three times a week          Frequency of Social Gatherings with Friends and Family: Once a week          Attends Scientologist Services: Never          Active Member of Clubs or Organizations: No          Attends Club or Organization Meetings: Never          Marital Status:   Housing Stability: Low Risk  (12/2/2023)      Housing Stability Vital Sign          Unable to Pay for Housing in the Last Year: No          Number of Places Lived in the Last Year: 1          Unstable Housing in the Last Year: No    Review of patient's allergies indicates:   -- Ciprofloxacin -- Itching   -- Contrast media     --  Kidney injury   -- Iodine     --  Kidney injury   -- Pcn [penicillins]     --  Rash; tolerated ceftriaxone on 1/13/20    Current Outpatient  Medications: ·  allopurinoL (ZYLOPRIM) 100 MG tablet, Take 0.5 tablets (50 mg total) by mouth every other day., Disp: 8 tablet, Rfl: 11·  apixaban (ELIQUIS) 5 mg Tab, Take 1 tablet (5 mg total) by mouth 2 (two) times daily., Disp: 60 tablet, Rfl: 11·  atorvastatin (LIPITOR) 80 MG tablet, Take 1 tablet (80 mg total) by mouth once daily., Disp: 90 tablet, Rfl: 3·  blood sugar diagnostic Strp, To check BG 3 times daily, to use with insurance preferred meter, Disp: 100 each, Rfl: 3·  blood-glucose meter Misc, To check BG 3 times daily, to use with insurance preferred meter, Disp: 1 each, Rfl: 0·  calcitRIOL (ROCALTROL) 0.5 MCG Cap, Take 1 capsule (0.5 mcg total) by mouth once daily., Disp: 90 capsule, Rfl: 3·  calcium acetate,phosphat bind, (PHOSLO) 667 mg capsule, Take 1 capsule (667 mg total) by mouth 3 (three) times daily with meals., Disp: 90 capsule, Rfl: 11·  carvediloL (COREG) 6.25 MG tablet, Take 1 tablet (6.25 mg total) by mouth 2 (two) times daily., Disp: 180 tablet, Rfl: 3·  clopidogreL (PLAVIX) 75 mg tablet, Take 1 tablet (75 mg total) by mouth once daily., Disp: 90 tablet, Rfl: 3·  famotidine (PEPCID) 20 MG tablet, Take 1 tablet (20 mg total) by mouth 2 (two) times daily., Disp: 180 tablet, Rfl: 3·  furosemide (LASIX) 40 MG tablet, Take 1 tablet (40 mg total) by mouth 2 (two) times a day., Disp: 180 tablet, Rfl: 1·  hydrALAZINE (APRESOLINE) 50 MG tablet, Take 1 tablet (50 mg total) by mouth 3 (three) times daily., Disp: 270 tablet, Rfl: 3·  hydrOXYzine HCL (ATARAX) 25 MG tablet, Take 1 tablet (25 mg total) by mouth 3 (three) times daily., Disp: 90 tablet, Rfl: 11·  insulin aspart, niacinamide, (FIASP FLEXTOUCH U-100 INSULIN) 100 unit/mL (3 mL) InPn, Inject 1-5 Units into the skin before meals as needed (Per sliding scale). Blood sugar 150 to 200 add 1 units.  Blood sugar 201 to 250 add 2 units.  Blood sugar 251 to 300 add 3 units Blood sugar 301 to 350 add 4 units.  Blood sugar greater than 350 add 5  "units., Disp: 3 pen , Rfl: 3·  insulin detemir U-100, Levemir, 100 unit/mL (3 mL) SubQ InPn pen, Inject 8 Units into the skin every evening., Disp: 3 mL, Rfl: 11·  isosorbide dinitrate (ISORDIL) 20 MG tablet, Take 2 tablets (40 mg total) by mouth 3 (three) times daily., Disp: 180 tablet, Rfl: 11·  lancets Misc, To check BG 3 times daily, to use with insurance preferred meter, Disp: 100 each, Rfl: 3·  pen needle, diabetic 32 gauge x 5/32" Ndle, 1 Units by Misc.(Non-Drug; Combo Route) route before meals as needed (SSI)., Disp: 50 each, Rfl: 3·  sertraline (ZOLOFT) 100 MG tablet, Take 1 tablet (100 mg total) by mouth once daily., Disp: 90 tablet, Rfl: 3·  sevelamer carbonate (RENVELA) 800 mg Tab, Take 2 tablets (1,600 mg total) by mouth 3 (three) times daily., Disp: 270 tablet, Rfl: 3·  sodium bicarbonate 650 MG tablet, Take 1 tablet (650 mg total) by mouth 3 (three) times daily., Disp: 90 tablet, Rfl: 0·  traZODone (DESYREL) 50 MG tablet, Take 0.5 tablets (25 mg total) by mouth every evening., Disp: 15 tablet, Rfl: 11    /70   Pulse 93   Resp 18   LMP 09/30/2015 (Exact Date)   SpO2 97%               Review of Systems   Constitutional:  Negative for activity change and appetite change.   Respiratory:  Negative for chest tightness, shortness of breath and wheezing.    Cardiovascular:  Negative for chest pain, palpitations and leg swelling.   Gastrointestinal:  Negative for abdominal pain and reflux.   Genitourinary:  Positive for decreased urine volume.   Musculoskeletal:         HAS PAIN TO RIGHT SHOULDER PAIN TODAY          Objective     Physical Exam  Vitals reviewed.   Constitutional:       General: She is not in acute distress.     Appearance: Normal appearance. She is ill-appearing. She is not toxic-appearing or diaphoretic.   Cardiovascular:      Rate and Rhythm: Normal rate and regular rhythm.      Pulses: Normal pulses.      Heart sounds: Normal heart sounds. No murmur heard.     No friction rub. No " gallop.   Pulmonary:      Effort: Pulmonary effort is normal. No respiratory distress.      Breath sounds: Normal breath sounds. No stridor. No wheezing, rhonchi or rales.   Chest:      Chest wall: No tenderness.   Abdominal:      Palpations: Abdomen is soft.      Tenderness: There is no abdominal tenderness.   Neurological:      Mental Status: She is alert.       Lab Results   Component Value Date    WBC 5.23 12/13/2023    HGB 10.9 (L) 12/13/2023    HCT 33.9 (L) 12/13/2023    MCV 97 12/13/2023     12/13/2023     CMP  Sodium   Date Value Ref Range Status   12/13/2023 131 (L) 136 - 145 mmol/L Final     Potassium   Date Value Ref Range Status   12/13/2023 4.3 3.5 - 5.1 mmol/L Final     Chloride   Date Value Ref Range Status   12/13/2023 93 (L) 95 - 110 mmol/L Final     CO2   Date Value Ref Range Status   12/13/2023 29 23 - 29 mmol/L Final     Glucose   Date Value Ref Range Status   12/13/2023 308 (H) 70 - 110 mg/dL Final     BUN   Date Value Ref Range Status   12/13/2023 55 (H) 6 - 20 mg/dL Final     Creatinine   Date Value Ref Range Status   12/13/2023 5.3 (H) 0.5 - 1.4 mg/dL Final     Calcium   Date Value Ref Range Status   12/13/2023 8.6 (L) 8.7 - 10.5 mg/dL Final     Total Protein   Date Value Ref Range Status   12/13/2023 7.9 6.0 - 8.4 g/dL Final     Albumin   Date Value Ref Range Status   12/13/2023 3.4 (L) 3.5 - 5.2 g/dL Final     Total Bilirubin   Date Value Ref Range Status   12/13/2023 1.1 (H) 0.1 - 1.0 mg/dL Final     Comment:     For infants and newborns, interpretation of results should be based  on gestational age, weight and in agreement with clinical  observations.    Premature Infant recommended reference ranges:  Up to 24 hours.............<8.0 mg/dL  Up to 48 hours............<12.0 mg/dL  3-5 days..................<15.0 mg/dL  6-29 days.................<15.0 mg/dL       Alkaline Phosphatase   Date Value Ref Range Status   12/13/2023 257 (H) 55 - 135 U/L Final     AST   Date Value Ref Range  Status   12/13/2023 23 10 - 40 U/L Final     ALT   Date Value Ref Range Status   12/13/2023 14 10 - 44 U/L Final     Anion Gap   Date Value Ref Range Status   12/13/2023 9 8 - 16 mmol/L Final     eGFR if    Date Value Ref Range Status   03/27/2022 29.9 (A) >60 mL/min/1.73 m^2 Final     eGFR if non    Date Value Ref Range Status   03/27/2022 25.9 (A) >60 mL/min/1.73 m^2 Final     Comment:     Calculation used to obtain the estimated glomerular filtration  rate (eGFR) is the CKD-EPI equation.        Lab Results   Component Value Date    CHOL 123 12/23/2023     Lab Results   Component Value Date    HDL 51 12/23/2023     Lab Results   Component Value Date    LDLCALC 57 12/23/2023     Lab Results   Component Value Date    TRIG 73 12/23/2023     Lab Results   Component Value Date    CHOLHDL 21.5 05/03/2023     Lab Results   Component Value Date    HGBA1C 10.5 (H) 12/02/2023         Assessment and Plan     1. Type 2 diabetes mellitus with hyperglycemia, with long-term current use of insulin  - uncontrolled     Ambulatory referral/consult to Endocrinology; Future; Expected date: 01/02/2024    2. Acute metabolic encephalopathy    3. Chronic combined systolic and diastolic heart failure  -     Ambulatory referral/consult to Cardiology; Future; Expected date: 01/02/2024    4. CAROLIN (generalized anxiety disorder)  -     sertraline (ZOLOFT) 100 MG tablet; Take 1 tablet (100 mg total) by mouth once daily.  Dispense: 90 tablet; Refill: 3    5. S/P CABG x 1  -     clopidogreL (PLAVIX) 75 mg tablet; Take 1 tablet (75 mg total) by mouth once daily.  Dispense: 90 tablet; Refill: 3    6. Coronary artery disease, unspecified vessel or lesion type, unspecified whether angina present, unspecified whether native or transplanted heart  Overview:  Formatting of this note might be different from the original.  Last Assessment & Plan:   Formatting of this note might be different from the original.  -- Optimize  glucose control.    Orders:  -     atorvastatin (LIPITOR) 80 MG tablet; Take 1 tablet (80 mg total) by mouth once daily.  Dispense: 90 tablet; Refill: 3    7. ESRD (end stage renal disease)  -     Ambulatory referral/consult to Nephrology; Future; Expected date: 01/02/2024    8. Depression, unspecified depression type  -     Ambulatory referral/consult to Psychology; Future; Expected date: 01/02/2024    Other orders  -     sevelamer carbonate (RENVELA) 800 mg Tab; Take 2 tablets (1,600 mg total) by mouth 3 (three) times daily.  Dispense: 270 tablet; Refill: 3  -     insulin aspart, niacinamide, (FIASP FLEXTOUCH U-100 INSULIN) 100 unit/mL (3 mL) InPn; Inject 1-5 Units into the skin before meals as needed (Per sliding scale). Blood sugar 150 to 200 add 1 units.  Blood sugar 201 to 250 add 2 units.  Blood sugar 251 to 300 add 3 units  Blood sugar 301 to 350 add 4 units.  Blood sugar greater than 350 add 5 units.  Dispense: 3 pen ; Refill: 3  -     insulin detemir U-100, Levemir, 100 unit/mL (3 mL) SubQ InPn pen; Inject 8 Units into the skin every evening.  Dispense: 3 mL; Refill: 11  -     isosorbide dinitrate (ISORDIL) 20 MG tablet; Take 2 tablets (40 mg total) by mouth 3 (three) times daily.  Dispense: 180 tablet; Refill: 11  -     hydrALAZINE (APRESOLINE) 50 MG tablet; Take 1 tablet (50 mg total) by mouth 3 (three) times daily.  Dispense: 270 tablet; Refill: 3  -     furosemide (LASIX) 40 MG tablet; Take 1 tablet (40 mg total) by mouth 2 (two) times a day.  Dispense: 180 tablet; Refill: 1  -     famotidine (PEPCID) 20 MG tablet; Take 1 tablet (20 mg total) by mouth 2 (two) times daily.  Dispense: 180 tablet; Refill: 3  -     carvediloL (COREG) 6.25 MG tablet; Take 1 tablet (6.25 mg total) by mouth 2 (two) times daily.  Dispense: 180 tablet; Refill: 3  -     calcium acetate,phosphat bind, (PHOSLO) 667 mg capsule; Take 1 capsule (667 mg total) by mouth 3 (three) times daily with meals.  Dispense: 90 capsule; Refill:  11  -     calcitRIOL (ROCALTROL) 0.5 MCG Cap; Take 1 capsule (0.5 mcg total) by mouth once daily.  Dispense: 90 capsule; Refill: 3  -     apixaban (ELIQUIS) 5 mg Tab; Take 1 tablet (5 mg total) by mouth 2 (two) times daily.  Dispense: 60 tablet; Refill: 11  -     allopurinoL (ZYLOPRIM) 100 MG tablet; Take 0.5 tablets (50 mg total) by mouth every other day.  Dispense: 8 tablet; Refill: 11  -     hydrOXYzine HCL (ATARAX) 25 MG tablet; Take 1 tablet (25 mg total) by mouth 3 (three) times daily.  Dispense: 90 tablet; Refill: 11  -     traZODone (DESYREL) 50 MG tablet; Take 0.5 tablets (25 mg total) by mouth every evening.  Dispense: 15 tablet; Refill: 11        I spent 45 minutes on this encounter, time includes face-to-face, chart review, documentation, test review and orders.           Follow up in about 3 months (around 3/26/2024).

## 2023-12-28 ENCOUNTER — TELEPHONE (OUTPATIENT)
Dept: PRIMARY CARE CLINIC | Facility: CLINIC | Age: 58
End: 2023-12-28
Payer: MEDICARE

## 2023-12-28 NOTE — TELEPHONE ENCOUNTER
Pt has an endocrinology referral because she wants them to manage her A1C. Unable to schedule due to pt being on dialysis    Please advise

## 2023-12-29 NOTE — TELEPHONE ENCOUNTER
Attempted to call pt's number, not a working number.  Called spouse's cell, no answer, no voice mail.    Xeljanz Counseling: I discussed with the patient the risks of Xeljanz therapy including increased risk of infection, liver issues, headache, diarrhea, or cold symptoms. Live vaccines should be avoided. They were instructed to call if they have any problems.

## 2024-01-05 ENCOUNTER — PATIENT OUTREACH (OUTPATIENT)
Dept: ADMINISTRATIVE | Facility: HOSPITAL | Age: 59
End: 2024-01-05
Payer: MEDICARE

## 2024-01-05 NOTE — PROGRESS NOTES
Population Health Chart Review & Patient Outreach Details      Further Action Needed If Patient Returns Outreach:            Updates Requested / Reviewed:     [x]  Care Everywhere    []     []  External Sources ([facility], [facility], [facility], etc.)    [] [facility]   [] [facility]   [] Other:    [x]  Care Team Updated   [x]  Removed  or Duplicate Orders   [x]  Immunization Reconciliation Completed / Queried    [x] [place]   [] [place]   [] [place]   [] [place]      Health Maintenance Topics Addressed and Outreach Outcomes / Actions Taken:             Breast Cancer Screening []  Mammogram Order Placed    []  Mammogram Screening Scheduled    []  External Records Requested & Care Team Updated if Applicable    []  External Records Uploaded & Care Team Updated if Applicable    []  Pt Declined Scheduling Mammogram    []  Pt Will Schedule with External Provider / Order Routed & Care Team Updated if Applicable              Cervical Cancer Screening []  Pap Smear Scheduled in Primary Care or OBGYN    []  External Records Requested & Care Team Updated if Applicable       []  External Records Uploaded, Care Team Updated, & History Updated if Applicable    []  Patient Declined Scheduling Pap Smear    []  Patient Will Schedule with External Provider & Care Team Updated if Applicable                  Colorectal Cancer Screening []  Colonoscopy Case Request / Referral / Home Test Order Placed    []  External Records Requested & Care Team Updated if Applicable    []  External Records Uploaded, Care Team Updated, & History Updated if Applicable    []  Patient Declined Completing Colon Cancer Screening    [x]  Patient Will Schedule with External Provider & Care Team Updated if Applicable    []  Fit Kit Mailed (add the SmartPhrase under additional notes)    []  Reminded Patient to Complete Home Test                Diabetic Eye Exam []  Eye Exam Screening Order Placed    []  Eye Camera Scheduled or Optometry/Ophthalmology  Referral Placed    []  External Records Requested & Care Team Updated if Applicable    []  External Records Uploaded, Care Team Updated, & History Updated if Applicable    []  Patient Declined Scheduling Eye Exam    []  Patient Will Schedule with External Provider & Care Team Updated if Applicable             Blood Pressure Control []  Primary Care Follow Up Visit Scheduled     []  Remote Blood Pressure Reading Captured    []  Patient Declined Remote Reading or Scheduling Appt - Escalated to PCP    []  Patient Will Call Back or Send Portal Message with Reading                 HbA1c & Other Labs []  Overdue Lab(s) Ordered    []  Overdue Lab(s) Scheduled    []  External Records Uploaded & Care Team Updated if Applicable    []  Primary Care Follow Up Visit Scheduled     []  Reminded Patient to Complete A1c Home Test    []  Patient Declined Scheduling Labs or Will Call Back to Schedule    []  Patient Will Schedule with External Provider / Order Routed, & Care Team Updated if Applicable           Primary Care Appointment []  Primary Care Appt Scheduled    []  Patient Declined Scheduling or Will Call Back to Schedule    []  Pt Established with External Provider, Updated Care Team, & Informed Pt to Notify Payor if Applicable           Medication Adherence /    Statin Use []  Primary Care Appointment Scheduled    []  Patient Reminded to  Prescription    []  Patient Declined, Provider Notified if Needed    []  Sent Provider Message to Review to Evaluate Pt for Statin, Add Exclusion Dx Codes, Document   Exclusion in Problem List, Change Statin Intensity Level to Moderate or High Intensity if Applicable                Osteoporosis Screening []  Dexa Order Placed    []  Dexa Appointment Scheduled    []  External Records Requested & Care Team Updated    []  External Records Uploaded, Care Team Updated, & History Updated if Applicable    []  Patient Declined Scheduling Dexa or Will Call Back to Schedule    []  Patient Will  Schedule with External Provider / Order Routed & Care Team Updated if Applicable       Additional Notes:

## 2024-01-08 ENCOUNTER — TELEPHONE (OUTPATIENT)
Dept: NEPHROLOGY | Facility: CLINIC | Age: 59
End: 2024-01-08
Payer: MEDICARE

## 2024-01-08 NOTE — TELEPHONE ENCOUNTER
----- Message from Morenita Davis sent at 1/8/2024  1:45 PM CST -----  Regarding: pt advice  Contact: [phone]  Pt  Randell Rivera calling in regards to pt  home dialysis, pls call

## 2024-01-10 ENCOUNTER — TELEPHONE (OUTPATIENT)
Dept: PRIMARY CARE CLINIC | Facility: CLINIC | Age: 59
End: 2024-01-10
Payer: MEDICARE

## 2024-01-10 DIAGNOSIS — Z89.611 S/P AKA (ABOVE KNEE AMPUTATION) BILATERAL: Primary | ICD-10-CM

## 2024-01-10 DIAGNOSIS — Z89.612 S/P AKA (ABOVE KNEE AMPUTATION) BILATERAL: Primary | ICD-10-CM

## 2024-01-10 NOTE — TELEPHONE ENCOUNTER
Patient stated that she was suppose to have Wheel chair orders placed but I do not see any, wants to go through [facility] please advise

## 2024-02-04 ENCOUNTER — HOSPITAL ENCOUNTER (INPATIENT)
Facility: HOSPITAL | Age: 59
LOS: 1 days | Discharge: HOME | DRG: 091 | End: 2024-02-07
Attending: EMERGENCY MEDICINE | Admitting: HOSPITALIST
Payer: MEDICARE

## 2024-02-04 DIAGNOSIS — G93.40 ENCEPHALOPATHY: ICD-10-CM

## 2024-02-04 DIAGNOSIS — R41.89 UNRESPONSIVE: ICD-10-CM

## 2024-02-04 DIAGNOSIS — I50.22 CHRONIC SYSTOLIC HEART FAILURE: ICD-10-CM

## 2024-02-04 DIAGNOSIS — Z91.89 AT RISK FOR LONG QT SYNDROME: ICD-10-CM

## 2024-02-04 DIAGNOSIS — R07.9 CHEST PAIN: ICD-10-CM

## 2024-02-04 DIAGNOSIS — R53.81 DEBILITY: Primary | ICD-10-CM

## 2024-02-04 PROBLEM — Z99.2 DEPENDENT ON HEMODIALYSIS: Status: RESOLVED | Noted: 2023-05-24 | Resolved: 2024-02-04

## 2024-02-04 PROBLEM — D63.1 ANEMIA DUE TO CHRONIC KIDNEY DISEASE, ON CHRONIC DIALYSIS: Status: ACTIVE | Noted: 2020-01-27

## 2024-02-04 PROBLEM — Z99.2 ANEMIA DUE TO CHRONIC KIDNEY DISEASE, ON CHRONIC DIALYSIS: Status: ACTIVE | Noted: 2020-01-27

## 2024-02-04 PROBLEM — N18.6 ANEMIA DUE TO CHRONIC KIDNEY DISEASE, ON CHRONIC DIALYSIS: Status: ACTIVE | Noted: 2020-01-27

## 2024-02-04 LAB
ALBUMIN SERPL BCP-MCNC: 2.9 G/DL (ref 3.5–5.2)
ALLENS TEST: ABNORMAL
ALLENS TEST: NORMAL
ALP SERPL-CCNC: 339 U/L (ref 55–135)
ALT SERPL W/O P-5'-P-CCNC: 17 U/L (ref 10–44)
AMMONIA PLAS-SCNC: 44 UMOL/L (ref 10–50)
ANION GAP SERPL CALC-SCNC: 11 MMOL/L (ref 8–16)
APTT PPP: 31 SEC (ref 21–32)
AST SERPL-CCNC: 29 U/L (ref 10–40)
BACTERIA #/AREA URNS AUTO: ABNORMAL /HPF
BASOPHILS # BLD AUTO: 0.06 K/UL (ref 0–0.2)
BASOPHILS # BLD AUTO: 0.07 K/UL (ref 0–0.2)
BASOPHILS NFR BLD: 1.4 % (ref 0–1.9)
BASOPHILS NFR BLD: 1.5 % (ref 0–1.9)
BILIRUB SERPL-MCNC: 0.8 MG/DL (ref 0.1–1)
BILIRUB UR QL STRIP: NEGATIVE
BNP SERPL-MCNC: 1024 PG/ML (ref 0–99)
BUN SERPL-MCNC: 37 MG/DL (ref 6–20)
BUN SERPL-MCNC: 51 MG/DL (ref 6–30)
CALCIUM SERPL-MCNC: 9.2 MG/DL (ref 8.7–10.5)
CHLORIDE SERPL-SCNC: 94 MMOL/L (ref 95–110)
CHLORIDE SERPL-SCNC: 98 MMOL/L (ref 95–110)
CLARITY UR REFRACT.AUTO: CLEAR
CO2 SERPL-SCNC: 25 MMOL/L (ref 23–29)
COLOR UR AUTO: YELLOW
CREAT SERPL-MCNC: 5.4 MG/DL (ref 0.5–1.4)
CREAT SERPL-MCNC: 5.9 MG/DL (ref 0.5–1.4)
DIFFERENTIAL METHOD BLD: ABNORMAL
DIFFERENTIAL METHOD BLD: ABNORMAL
EOSINOPHIL # BLD AUTO: 0.4 K/UL (ref 0–0.5)
EOSINOPHIL # BLD AUTO: 0.4 K/UL (ref 0–0.5)
EOSINOPHIL NFR BLD: 10.2 % (ref 0–8)
EOSINOPHIL NFR BLD: 8.8 % (ref 0–8)
ERYTHROCYTE [DISTWIDTH] IN BLOOD BY AUTOMATED COUNT: 16.2 % (ref 11.5–14.5)
ERYTHROCYTE [DISTWIDTH] IN BLOOD BY AUTOMATED COUNT: 16.3 % (ref 11.5–14.5)
EST. GFR  (NO RACE VARIABLE): 8.7 ML/MIN/1.73 M^2
ESTIMATED AVG GLUCOSE: 232 MG/DL (ref 68–131)
GLUCOSE SERPL-MCNC: 225 MG/DL (ref 70–110)
GLUCOSE SERPL-MCNC: 239 MG/DL (ref 70–110)
GLUCOSE UR QL STRIP: ABNORMAL
HBA1C MFR BLD: 9.7 % (ref 4–5.6)
HCO3 UR-SCNC: 33.7 MMOL/L (ref 24–28)
HCT VFR BLD AUTO: 36.7 % (ref 37–48.5)
HCT VFR BLD AUTO: 38.2 % (ref 37–48.5)
HCT VFR BLD CALC: 36 %PCV (ref 36–54)
HGB BLD-MCNC: 11.7 G/DL (ref 12–16)
HGB BLD-MCNC: 12 G/DL (ref 12–16)
HGB UR QL STRIP: ABNORMAL
HYALINE CASTS UR QL AUTO: 8 /LPF
IMM GRANULOCYTES # BLD AUTO: 0 K/UL (ref 0–0.04)
IMM GRANULOCYTES # BLD AUTO: 0.01 K/UL (ref 0–0.04)
IMM GRANULOCYTES NFR BLD AUTO: 0 % (ref 0–0.5)
IMM GRANULOCYTES NFR BLD AUTO: 0.2 % (ref 0–0.5)
INR PPP: 1.2 (ref 0.8–1.2)
IRON SERPL-MCNC: 64 UG/DL (ref 30–160)
KETONES UR QL STRIP: NEGATIVE
LDH SERPL L TO P-CCNC: 0.92 MMOL/L (ref 0.5–2.2)
LEUKOCYTE ESTERASE UR QL STRIP: NEGATIVE
LYMPHOCYTES # BLD AUTO: 1 K/UL (ref 1–4.8)
LYMPHOCYTES # BLD AUTO: 1.1 K/UL (ref 1–4.8)
LYMPHOCYTES NFR BLD: 19.8 % (ref 18–48)
LYMPHOCYTES NFR BLD: 25.9 % (ref 18–48)
MAGNESIUM SERPL-MCNC: 2.2 MG/DL (ref 1.6–2.6)
MCH RBC QN AUTO: 30.3 PG (ref 27–31)
MCH RBC QN AUTO: 31.4 PG (ref 27–31)
MCHC RBC AUTO-ENTMCNC: 31.4 G/DL (ref 32–36)
MCHC RBC AUTO-ENTMCNC: 31.9 G/DL (ref 32–36)
MCV RBC AUTO: 97 FL (ref 82–98)
MCV RBC AUTO: 98 FL (ref 82–98)
MICROSCOPIC COMMENT: ABNORMAL
MONOCYTES # BLD AUTO: 0.5 K/UL (ref 0.3–1)
MONOCYTES # BLD AUTO: 0.6 K/UL (ref 0.3–1)
MONOCYTES NFR BLD: 11.3 % (ref 4–15)
MONOCYTES NFR BLD: 13.7 % (ref 4–15)
NEUTROPHILS # BLD AUTO: 2.1 K/UL (ref 1.8–7.7)
NEUTROPHILS # BLD AUTO: 2.8 K/UL (ref 1.8–7.7)
NEUTROPHILS NFR BLD: 48.8 % (ref 38–73)
NEUTROPHILS NFR BLD: 58.4 % (ref 38–73)
NITRITE UR QL STRIP: NEGATIVE
NRBC BLD-RTO: 0 /100 WBC
NRBC BLD-RTO: 0 /100 WBC
PCO2 BLDA: 60.8 MMHG (ref 35–45)
PH SMN: 7.35 [PH] (ref 7.35–7.45)
PH UR STRIP: 7 [PH] (ref 5–8)
PHOSPHATE SERPL-MCNC: 5.4 MG/DL (ref 2.7–4.5)
PLATELET # BLD AUTO: 190 K/UL (ref 150–450)
PLATELET # BLD AUTO: 212 K/UL (ref 150–450)
PMV BLD AUTO: 11.2 FL (ref 9.2–12.9)
PMV BLD AUTO: 11.4 FL (ref 9.2–12.9)
PO2 BLDA: 28 MMHG (ref 40–60)
POC BE: 8 MMOL/L
POC IONIZED CALCIUM: 1.14 MMOL/L (ref 1.06–1.42)
POC SATURATED O2: 47 % (ref 95–100)
POC TCO2 (MEASURED): 35 MMOL/L (ref 23–29)
POC TCO2: 35 MMOL/L (ref 24–29)
POCT GLUCOSE: 199 MG/DL (ref 70–110)
POCT GLUCOSE: 235 MG/DL (ref 70–110)
POCT GLUCOSE: 252 MG/DL (ref 70–110)
POTASSIUM BLD-SCNC: 3.9 MMOL/L (ref 3.5–5.1)
POTASSIUM SERPL-SCNC: 3.6 MMOL/L (ref 3.5–5.1)
PROT SERPL-MCNC: 7.8 G/DL (ref 6–8.4)
PROT UR QL STRIP: ABNORMAL
PROTHROMBIN TIME: 13 SEC (ref 9–12.5)
RBC # BLD AUTO: 3.73 M/UL (ref 4–5.4)
RBC # BLD AUTO: 3.96 M/UL (ref 4–5.4)
RBC #/AREA URNS AUTO: 17 /HPF (ref 0–4)
SALICYLATES SERPL-MCNC: <5 MG/DL (ref 15–30)
SAMPLE: ABNORMAL
SAMPLE: ABNORMAL
SAMPLE: NORMAL
SATURATED IRON: 26 % (ref 20–50)
SITE: ABNORMAL
SITE: NORMAL
SODIUM BLD-SCNC: 136 MMOL/L (ref 136–145)
SODIUM SERPL-SCNC: 134 MMOL/L (ref 136–145)
SP GR UR STRIP: 1.02 (ref 1–1.03)
TOTAL IRON BINDING CAPACITY: 249 UG/DL (ref 250–450)
TRANSFERRIN SERPL-MCNC: 168 MG/DL (ref 200–375)
TROPONIN I SERPL DL<=0.01 NG/ML-MCNC: 0.05 NG/ML (ref 0–0.03)
URN SPEC COLLECT METH UR: ABNORMAL
WBC # BLD AUTO: 4.32 K/UL (ref 3.9–12.7)
WBC # BLD AUTO: 4.79 K/UL (ref 3.9–12.7)
WBC #/AREA URNS AUTO: 4 /HPF (ref 0–5)
YEAST UR QL AUTO: ABNORMAL

## 2024-02-04 PROCEDURE — 82962 GLUCOSE BLOOD TEST: CPT | Mod: HCNC

## 2024-02-04 PROCEDURE — 83540 ASSAY OF IRON: CPT | Mod: HCNC | Performed by: STUDENT IN AN ORGANIZED HEALTH CARE EDUCATION/TRAINING PROGRAM

## 2024-02-04 PROCEDURE — G0378 HOSPITAL OBSERVATION PER HR: HCPCS | Mod: HCNC

## 2024-02-04 PROCEDURE — 82803 BLOOD GASES ANY COMBINATION: CPT | Mod: HCNC

## 2024-02-04 PROCEDURE — 93010 ELECTROCARDIOGRAM REPORT: CPT | Mod: HCNC,,, | Performed by: INTERNAL MEDICINE

## 2024-02-04 PROCEDURE — 99900035 HC TECH TIME PER 15 MIN (STAT): Mod: HCNC

## 2024-02-04 PROCEDURE — 25000242 PHARM REV CODE 250 ALT 637 W/ HCPCS: Mod: HCNC

## 2024-02-04 PROCEDURE — 96372 THER/PROPH/DIAG INJ SC/IM: CPT | Performed by: STUDENT IN AN ORGANIZED HEALTH CARE EDUCATION/TRAINING PROGRAM

## 2024-02-04 PROCEDURE — 99285 EMERGENCY DEPT VISIT HI MDM: CPT | Mod: 25,HCNC

## 2024-02-04 PROCEDURE — 84484 ASSAY OF TROPONIN QUANT: CPT | Mod: HCNC

## 2024-02-04 PROCEDURE — 82728 ASSAY OF FERRITIN: CPT | Mod: HCNC | Performed by: STUDENT IN AN ORGANIZED HEALTH CARE EDUCATION/TRAINING PROGRAM

## 2024-02-04 PROCEDURE — 94761 N-INVAS EAR/PLS OXIMETRY MLT: CPT | Mod: HCNC,XB

## 2024-02-04 PROCEDURE — 83735 ASSAY OF MAGNESIUM: CPT | Mod: HCNC | Performed by: EMERGENCY MEDICINE

## 2024-02-04 PROCEDURE — 85025 COMPLETE CBC W/AUTO DIFF WBC: CPT | Mod: 91,HCNC | Performed by: STUDENT IN AN ORGANIZED HEALTH CARE EDUCATION/TRAINING PROGRAM

## 2024-02-04 PROCEDURE — 80053 COMPREHEN METABOLIC PANEL: CPT | Mod: HCNC | Performed by: EMERGENCY MEDICINE

## 2024-02-04 PROCEDURE — 85610 PROTHROMBIN TIME: CPT | Mod: HCNC | Performed by: STUDENT IN AN ORGANIZED HEALTH CARE EDUCATION/TRAINING PROGRAM

## 2024-02-04 PROCEDURE — 84100 ASSAY OF PHOSPHORUS: CPT | Mod: HCNC | Performed by: EMERGENCY MEDICINE

## 2024-02-04 PROCEDURE — 87040 BLOOD CULTURE FOR BACTERIA: CPT | Mod: HCNC

## 2024-02-04 PROCEDURE — 83605 ASSAY OF LACTIC ACID: CPT | Mod: HCNC

## 2024-02-04 PROCEDURE — 25000003 PHARM REV CODE 250: Mod: HCNC | Performed by: STUDENT IN AN ORGANIZED HEALTH CARE EDUCATION/TRAINING PROGRAM

## 2024-02-04 PROCEDURE — 83880 ASSAY OF NATRIURETIC PEPTIDE: CPT | Mod: HCNC

## 2024-02-04 PROCEDURE — 96366 THER/PROPH/DIAG IV INF ADDON: CPT | Mod: HCNC

## 2024-02-04 PROCEDURE — 82140 ASSAY OF AMMONIA: CPT | Mod: HCNC | Performed by: EMERGENCY MEDICINE

## 2024-02-04 PROCEDURE — 83036 HEMOGLOBIN GLYCOSYLATED A1C: CPT | Mod: HCNC | Performed by: STUDENT IN AN ORGANIZED HEALTH CARE EDUCATION/TRAINING PROGRAM

## 2024-02-04 PROCEDURE — 96365 THER/PROPH/DIAG IV INF INIT: CPT | Mod: HCNC

## 2024-02-04 PROCEDURE — 81001 URINALYSIS AUTO W/SCOPE: CPT | Mod: HCNC

## 2024-02-04 PROCEDURE — 80179 DRUG ASSAY SALICYLATE: CPT | Mod: HCNC

## 2024-02-04 PROCEDURE — 85025 COMPLETE CBC W/AUTO DIFF WBC: CPT | Mod: HCNC

## 2024-02-04 PROCEDURE — P9612 CATHETERIZE FOR URINE SPEC: HCPCS | Mod: HCNC

## 2024-02-04 PROCEDURE — 94640 AIRWAY INHALATION TREATMENT: CPT | Mod: HCNC

## 2024-02-04 PROCEDURE — 93005 ELECTROCARDIOGRAM TRACING: CPT | Mod: HCNC

## 2024-02-04 PROCEDURE — 85730 THROMBOPLASTIN TIME PARTIAL: CPT | Mod: HCNC | Performed by: STUDENT IN AN ORGANIZED HEALTH CARE EDUCATION/TRAINING PROGRAM

## 2024-02-04 RX ORDER — TALC
6 POWDER (GRAM) TOPICAL NIGHTLY PRN
Status: DISCONTINUED | OUTPATIENT
Start: 2024-02-04 | End: 2024-02-04

## 2024-02-04 RX ORDER — ATORVASTATIN CALCIUM 40 MG/1
80 TABLET, FILM COATED ORAL DAILY
Status: DISCONTINUED | OUTPATIENT
Start: 2024-02-05 | End: 2024-02-04

## 2024-02-04 RX ORDER — GLUCAGON 1 MG
1 KIT INJECTION
Status: DISCONTINUED | OUTPATIENT
Start: 2024-02-04 | End: 2024-02-07 | Stop reason: HOSPADM

## 2024-02-04 RX ORDER — IBUPROFEN 200 MG
16 TABLET ORAL
Status: DISCONTINUED | OUTPATIENT
Start: 2024-02-04 | End: 2024-02-07 | Stop reason: HOSPADM

## 2024-02-04 RX ORDER — ACETAMINOPHEN 500 MG
1000 TABLET ORAL EVERY 8 HOURS PRN
Status: DISCONTINUED | OUTPATIENT
Start: 2024-02-04 | End: 2024-02-04

## 2024-02-04 RX ORDER — ISOSORBIDE DINITRATE 40 MG/1
40 TABLET ORAL 3 TIMES DAILY
Status: DISCONTINUED | OUTPATIENT
Start: 2024-02-04 | End: 2024-02-04

## 2024-02-04 RX ORDER — IBUPROFEN 200 MG
24 TABLET ORAL
Status: DISCONTINUED | OUTPATIENT
Start: 2024-02-04 | End: 2024-02-07 | Stop reason: HOSPADM

## 2024-02-04 RX ORDER — HEPARIN SODIUM,PORCINE/D5W 25000/250
0-40 INTRAVENOUS SOLUTION INTRAVENOUS CONTINUOUS
Status: DISCONTINUED | OUTPATIENT
Start: 2024-02-05 | End: 2024-02-05

## 2024-02-04 RX ORDER — FAMOTIDINE 20 MG/1
20 TABLET, FILM COATED ORAL EVERY 24 HOURS
Status: DISCONTINUED | OUTPATIENT
Start: 2024-02-05 | End: 2024-02-04

## 2024-02-04 RX ORDER — INSULIN ASPART 100 [IU]/ML
0-5 INJECTION, SOLUTION INTRAVENOUS; SUBCUTANEOUS EVERY 6 HOURS PRN
Status: DISCONTINUED | OUTPATIENT
Start: 2024-02-04 | End: 2024-02-07 | Stop reason: HOSPADM

## 2024-02-04 RX ORDER — CALCIUM ACETATE 667 MG/1
667 CAPSULE ORAL
Status: DISCONTINUED | OUTPATIENT
Start: 2024-02-05 | End: 2024-02-04

## 2024-02-04 RX ORDER — CLOPIDOGREL BISULFATE 75 MG/1
75 TABLET ORAL DAILY
Status: DISCONTINUED | OUTPATIENT
Start: 2024-02-05 | End: 2024-02-04

## 2024-02-04 RX ORDER — CARVEDILOL 3.12 MG/1
6.25 TABLET ORAL 2 TIMES DAILY
Status: DISCONTINUED | OUTPATIENT
Start: 2024-02-04 | End: 2024-02-04

## 2024-02-04 RX ORDER — SEVELAMER CARBONATE 800 MG/1
1600 TABLET, FILM COATED ORAL 3 TIMES DAILY
Status: DISCONTINUED | OUTPATIENT
Start: 2024-02-04 | End: 2024-02-04

## 2024-02-04 RX ORDER — SODIUM CHLORIDE 0.9 % (FLUSH) 0.9 %
10 SYRINGE (ML) INJECTION
Status: DISCONTINUED | OUTPATIENT
Start: 2024-02-04 | End: 2024-02-07 | Stop reason: HOSPADM

## 2024-02-04 RX ORDER — IPRATROPIUM BROMIDE AND ALBUTEROL SULFATE 2.5; .5 MG/3ML; MG/3ML
3 SOLUTION RESPIRATORY (INHALATION)
Status: COMPLETED | OUTPATIENT
Start: 2024-02-04 | End: 2024-02-04

## 2024-02-04 RX ORDER — CALCITRIOL 0.5 UG/1
0.5 CAPSULE ORAL DAILY
Status: DISCONTINUED | OUTPATIENT
Start: 2024-02-05 | End: 2024-02-04

## 2024-02-04 RX ORDER — HYDRALAZINE HYDROCHLORIDE 25 MG/1
50 TABLET, FILM COATED ORAL 3 TIMES DAILY
Status: DISCONTINUED | OUTPATIENT
Start: 2024-02-04 | End: 2024-02-04

## 2024-02-04 RX ORDER — SODIUM BICARBONATE 650 MG/1
650 TABLET ORAL 3 TIMES DAILY
Status: DISCONTINUED | OUTPATIENT
Start: 2024-02-04 | End: 2024-02-04

## 2024-02-04 RX ORDER — ACETAMINOPHEN 325 MG/1
650 TABLET ORAL EVERY 4 HOURS PRN
Status: DISCONTINUED | OUTPATIENT
Start: 2024-02-04 | End: 2024-02-04

## 2024-02-04 RX ORDER — NALOXONE HCL 0.4 MG/ML
0.02 VIAL (ML) INJECTION
Status: DISCONTINUED | OUTPATIENT
Start: 2024-02-04 | End: 2024-02-07 | Stop reason: HOSPADM

## 2024-02-04 RX ADMIN — IPRATROPIUM BROMIDE AND ALBUTEROL SULFATE 3 ML: .5; 3 SOLUTION RESPIRATORY (INHALATION) at 05:02

## 2024-02-04 RX ADMIN — INSULIN DETEMIR 6 UNITS: 100 INJECTION, SOLUTION SUBCUTANEOUS at 11:02

## 2024-02-04 RX ADMIN — IPRATROPIUM BROMIDE AND ALBUTEROL SULFATE 3 ML: .5; 3 SOLUTION RESPIRATORY (INHALATION) at 06:02

## 2024-02-04 NOTE — ED NOTES
LOC: The patient is disoriented x 4.  APPEARANCE: Patient resting comfortably and in no acute distress, patient is clean and well groomed, patient's clothing is properly fastened.  SKIN: The skin is warm and dry, color consistent with ethnicity, patient has normal skin turgor and moist mucus membranes, skin intact, no breakdown or bruising noted.  MUSCULOSKELETAL: Patient not moving extremities; bilateral AKA's noted.  RESPIRATORY: Airway is open and patent, respirations are spontaneous, patient has a normal effort and rate, no accessory muscle use noted.  CARDIAC: Patient has a normal rate and regular rhythm, no periphreal edema noted, capillary refill < 3 seconds.  ABDOMEN: Soft and non tender to palpation, no distention noted, normoactive bowel sounds present in all four quadrants.  NEUROLOGIC:  facial expression is symmetrical, not following commands.

## 2024-02-04 NOTE — ED PROVIDER NOTES
Encounter Date: 2/4/2024       History     Chief Complaint   Patient presents with    Altered Mental Status     Responds to pain, max amputee      Suyapa Connelly is a 58 y.o. female with a PMH of ESRD on HD T/Th/Sat,  uremic encephalopathy, metabolic encephalopathy, T2 DM, bilateral above-the-knee amputations due to osteomyelitis presenting to [facility]  for evaluation of altered mental status.  Patient's  at bedside states that the patient was altered yesterday following dialysis having hallucinations.  He states this has happened once before.  When he woke up this morning the patient was completely unresponsive to him and so he called EMS.    states this happened once before when she was taking gabapentin and stopped making urine /got put on dialysis, and he states the gabapentin built up in her system and cause similar symptoms.  However the patient is not taking gabapentin anymore, is only on pregabalin.  Patient's  states that about a month ago she had her Seroquel doubled, and was given a medication to help with sleep (doesn't know which one).  Patient is responsive to pain, opens her eyes, but is not speaking. Follows commands to squeeze my hands, equal strength in bilateral upper extremities.              The history is provided by the patient and medical records. The history is limited by the condition of the patient. No  was used.     Review of patient's allergies indicates:   Allergen Reactions    Ciprofloxacin Itching    Contrast media      Kidney injury    Iodine      Kidney injury    Pcn [penicillins]      Rash; tolerated ceftriaxone on 1/13/20     Past Medical History:   Diagnosis Date    Anxiety     Chronic pain syndrome     CKD (chronic kidney disease), stage III     Depression     Diabetes mellitus     type 2    Diabetes mellitus, type 2     GERD (gastroesophageal reflux disease)     Hyperemesis 3/23/2021    Hypokalemia 3/23/2021     Infection of below knee amputation stump 3/12/2022    Osteomyelitis     Osteomyelitis of left foot 4/30/2021    Ulcer of left foot     Vaginal delivery     x1     Past Surgical History:   Procedure Laterality Date    ABOVE-KNEE AMPUTATION Left 5/18/2021    Procedure: AMPUTATION, ABOVE KNEE;  Surgeon: Teddy Huber MD;  Location: [address];  Service: Vascular;  Laterality: Left;    ABOVE-KNEE AMPUTATION Right 3/18/2022    Procedure: AMPUTATION, ABOVE KNEE;  Surgeon: DAYNE Florez II, MD;  Location: [address];  Service: Vascular;  Laterality: Right;    Angiogram - Right Extremity Right 7/9/15    angiogram-left leg  10/6/15    ANGIOGRAPHY OF LOWER EXTREMITY Left 4/29/2021    Procedure: ANGIOGRAM, LOWER EXTREMITY;  Surgeon: Teddy Huber MD;  Location: [address];  Service: Vascular;  Laterality: Left;    BELOW KNEE AMPUTATION OF LOWER EXTREMITY Right 12/28/2021    Procedure: AMPUTATION, BELOW KNEE;  Surgeon: Kaitlyn Rojas MD;  Location: [facility];  Service: General;  Laterality: Right;    CATHETERIZATION OF BOTH LEFT AND RIGHT HEART N/A 12/18/2019    Procedure: CATHETERIZATION, HEART, BOTH LEFT AND RIGHT;  Surgeon: Que Fernando III, MD;  Location: [facility] CATH LAB;  Service: Cardiology;  Laterality: N/A;    CORONARY ANGIOGRAPHY N/A 12/18/2019    Procedure: ANGIOGRAM, CORONARY ARTERY;  Surgeon: Que Fernando III, MD;  Location: [facility] CATH LAB;  Service: Cardiology;  Laterality: N/A;    CORONARY ANGIOGRAPHY INCLUDING BYPASS GRAFTS WITH CATHETERIZATION OF LEFT HEART N/A 7/28/2020    Procedure: ANGIOGRAM, CORONARY, INCLUDING BYPASS GRAFT, WITH LEFT HEART CATHETERIZATION, 9 am;  Surgeon: Rachel Easley MD;  Location: [facility] CATH LAB;  Service: Cardiology;  Laterality: N/A;    CORONARY ARTERY BYPASS GRAFT (CABG) N/A 1/14/2020    Procedure: CORONARY ARTERY BYPASS GRAFT (CABG) x 1     Off Pump;  Surgeon: Huang Altamirano MD;  Location: [address];  Service: Cardiovascular;   Laterality: N/A;    CREATION OF FEMORAL-TIBIAL ARTERY BYPASS Left 4/29/2021    Procedure: CREATION, BYPASS, ARTERIAL, FEMORAL TO ANTERIOR TIBIAL;  Surgeon: Teddy Huber MD;  [facility];  Service: Vascular;  Laterality: Left;    CREATION OF FEMOROPOPLITEAL ARTERIAL BYPASS USING GRAFT Left 8/18/2020    Procedure: CREATION, BYPASS, ARTERIAL, FEMORAL TO POPLITEAL, USING GRAFT, LEFT LOWER EXTREMITY;  Surgeon: Teddy Huber MD;  Location: [facility] OR;  Service: Vascular;  Laterality: Left;  REQUEST 7:15 A.M. START----COVID NEGATIVE ON 8/17  1ST CASE STARTKENYA DUEÑAS ON 8/7/2020 @ 942AM-LO  RN PREOP 8/12/2020   T/S-----CLEARED BY CARDS-------PENDING INSURANCE    DEBRIDEMENT OF FOOT Left 9/8/2020    Procedure: DEBRIDEMENT, FOOT;  Surgeon: Rosio Mayes DPM;  Location: [facility] OR;  Service: Podiatry;  Laterality: Left;  request neoxx .   RN Pre Op 9-4-2020, Covid negative on 9/5/20. C A    DEBRIDEMENT OF FOOT  3/4/2021    Procedure: DEBRIDEMENT, FOOT;  Surgeon: Teddy Huber MD;  Location: [facility] OR;  Service: Vascular;;    DEBRIDEMENT OF FOOT Left 3/9/2021    Procedure: DEBRIDEMENT, FOOT, bone biopsy;  Surgeon: Rosio Mayes DPM;  Location: [facility] OR;  Service: Podiatry;  Laterality: Left;  Request neoxx---COVID IN AM  REQUESTING NOON START  RN Phone Pre op.On Blood thinners Plavix and Eliquis.  Covid am of surgery. C A    DEBRIDEMENT OF FOOT Left 5/4/2021    Procedure: DEBRIDEMENT, FOOT;  Surgeon: Farooq Morley DPM;  [facility];  Service: Podiatry;  Laterality: Left;    INSERTION OF TUNNELED CENTRAL VENOUS HEMODIALYSIS CATHETER N/A 1/27/2020    Procedure: Insertion, Catheter, Central Venous, Hemodialysis;  Surgeon: ESTEBAN Gomez III, MD;  Location: [facility] CATH LAB;  Service: Peripheral Vascular;  Laterality: N/A;    INSERTION OF TUNNELED CENTRAL VENOUS HEMODIALYSIS CATHETER  5/10/2023    Procedure: Insertion, Catheter, Central Venous, Hemodialysis;  Surgeon: Romulo VELIZ  MD Bret;  Location: [facility] CATH LAB;  Service: Cardiology;;    PERCUTANEOUS TRANSLUMINAL ANGIOPLASTY N/A 3/4/2021    Procedure: PTA (ANGIOPLASTY, PERCUTANEOUS, TRANSLUMINAL);  Surgeon: Teddy Huber MD;  Location: [facility] OR;  Service: Vascular;  Laterality: N/A;    REMOVAL OF ARTERIOVENOUS GRAFT Left 5/27/2021    Procedure: REMOVAL, GRAFT, LEFT LOWER EXTREMITY, WOUND EXPLORATION;  Surgeon: Teddy Huber MD;  [address];  Service: Vascular;  Laterality: Left;    REMOVAL OF NAIL OF DIGIT Left 3/9/2021    Procedure: REMOVAL, NAIL, DIGIT;  Surgeon: Rosio Mayes DPM;  Location: [facility] OR;  Service: Podiatry;  Laterality: Left;    RIGHT HEART CATHETERIZATION Right 5/10/2023    Procedure: INSERTION, CATHETER, RIGHT HEART;  Surgeon: Romulo Queen MD;  Location: [facility] CATH LAB;  Service: Cardiology;  Laterality: Right;    THROMBECTOMY Left 3/4/2021    Procedure: THROMBECTOMY, LEFT LOWER EXTREMITY BYPASS GRAFT, ANGIOGRAM, POSSIBLE INTERVENTION, POSSIBLE LEFT LOWER EXTREMITY BYPASS;  Surgeon: Teddy Huber MD;  Location: [facility] OR;  Service: Vascular;  Laterality: Left;    THROMBECTOMY Left 4/29/2021    Procedure: GRAFT THROMBECTOMY, LEFT LOWER EXTREMITY;  Surgeon: Teddy Huber MD;  [address];  Service: Vascular;  Laterality: Left;  14.5 min  1179.85 mGy  341.01 Gycm2  240 ml dye    THROMBECTOMY  10/22/2021    Procedure: THROMBECTOMY;  Surgeon: Saad Arenas MD;  Location: [facility] CATH LAB/EP;  Service: Cardiology;;     Family History   Problem Relation Age of Onset    Diabetes Mother     Diabetes Father     Heart disease Maternal Grandmother     No Known Problems Maternal Grandfather     Diabetes Paternal Grandmother     No Known Problems Paternal Grandfather     Anesthesia problems Neg Hx      Social History     Tobacco Use    Smoking status: Former    Smokeless tobacco: Never   Substance Use Topics    Alcohol use: No    Drug use: Yes     Types: Marijuana      Comment: occassional     Review of Systems    Physical Exam     Initial Vitals [02/04/24 1557]   BP Pulse Resp Temp SpO2   (!) 166/85 73 20 97.4 °F (36.3 °C) 98 %      MAP       --         Physical Exam    Nursing note and vitals reviewed.  Constitutional: She appears well-developed and well-nourished. No distress.   HENT:   Head: Normocephalic.   Mouth/Throat: Oropharynx is clear and moist.   Eyes: Pupils are equal, round, and reactive to light. No scleral icterus.   Neck: Neck supple.   Cardiovascular:  Normal rate and regular rhythm.           Pulmonary/Chest: No stridor. No respiratory distress. She has wheezes (mild scattered).   Left subclavian HD catheter in place without surrounding erythema, warmth, or induration    Abdominal: Abdomen is soft. She exhibits no distension. There is no abdominal tenderness.   Musculoskeletal:         General: No edema.      Cervical back: Neck supple.      Comments: Bilateral AKA, stumps appears clean without any induration or erythema      Neurological: She is disoriented. GCS eye subscore is 2. GCS verbal subscore is 2. GCS motor subscore is 6.   Follows commands to squeeze my hands, equal strength in bilateral upper extremities   Skin: Skin is warm and dry. Capillary refill takes less than 2 seconds.         ED Course   Procedures  Labs Reviewed   CBC W/ AUTO DIFFERENTIAL - Abnormal; Notable for the following components:       Result Value    RBC 3.73 (*)     Hemoglobin 11.7 (*)     Hematocrit 36.7 (*)     MCH 31.4 (*)     MCHC 31.9 (*)     RDW 16.3 (*)     Eosinophil % 8.8 (*)     All other components within normal limits   URINALYSIS, REFLEX TO URINE CULTURE - Abnormal; Notable for the following components:    Protein, UA 3+ (*)     Glucose, UA 3+ (*)     Occult Blood UA 2+ (*)     All other components within normal limits    Narrative:     Specimen Source->Urine   B-TYPE NATRIURETIC PEPTIDE - Abnormal; Notable for the following components:    BNP 1,024 (*)     All other  components within normal limits   TROPONIN I - Abnormal; Notable for the following components:    Troponin I 0.047 (*)     All other components within normal limits   SALICYLATE LEVEL - Abnormal; Notable for the following components:    Salicylate Lvl <5.0 (*)     All other components within normal limits   COMPREHENSIVE METABOLIC PANEL - Abnormal; Notable for the following components:    Sodium 134 (*)     Glucose 225 (*)     BUN 37 (*)     Creatinine 5.4 (*)     Albumin 2.9 (*)     Alkaline Phosphatase 339 (*)     eGFR 8.7 (*)     All other components within normal limits   PHOSPHORUS - Abnormal; Notable for the following components:    Phosphorus 5.4 (*)     All other components within normal limits   HEMOGLOBIN A1C - Abnormal; Notable for the following components:    Hemoglobin A1C 9.7 (*)     Estimated Avg Glucose 232 (*)     All other components within normal limits   IRON AND TIBC - Abnormal; Notable for the following components:    Transferrin 168 (*)     TIBC 249 (*)     All other components within normal limits   URINALYSIS MICROSCOPIC - Abnormal; Notable for the following components:    RBC, UA 17 (*)     Hyaline Casts, UA 8 (*)     All other components within normal limits    Narrative:     Specimen Source->Urine   PROTIME-INR - Abnormal; Notable for the following components:    Prothrombin Time 13.0 (*)     All other components within normal limits    Narrative:     Draw baseline aPTT prior to starting the heparin bolus or  infusion  (if patient is on warfarin prior to heparin therapy)   CBC W/ AUTO DIFFERENTIAL - Abnormal; Notable for the following components:    RBC 3.96 (*)     MCHC 31.4 (*)     RDW 16.2 (*)     Eosinophil % 10.2 (*)     All other components within normal limits    Narrative:     Draw baseline aPTT prior to starting the heparin bolus or  infusion  (if patient is on warfarin prior to heparin therapy)   POCT GLUCOSE - Abnormal; Notable for the following components:    POCT Glucose 235  (*)     All other components within normal limits   POCT GLUCOSE - Abnormal; Notable for the following components:    POCT Glucose 252 (*)     All other components within normal limits   ISTAT PROCEDURE - Abnormal; Notable for the following components:    POC PCO2 60.8 (*)     POC PO2 28 (*)     POC HCO3 33.7 (*)     POC BE 8 (*)     POC TCO2 35 (*)     All other components within normal limits   ISTAT PROCEDURE - Abnormal; Notable for the following components:    POC Glucose 239 (*)     POC BUN 51 (*)     POC Creatinine 5.9 (*)     POC Chloride 94 (*)     POC TCO2 (MEASURED) 35 (*)     All other components within normal limits   POCT GLUCOSE - Abnormal; Notable for the following components:    POCT Glucose 199 (*)     All other components within normal limits   CULTURE, BLOOD   CULTURE, BLOOD   AMMONIA   MAGNESIUM   APTT    Narrative:     Draw baseline aPTT prior to starting the heparin bolus or  infusion  (if patient is on warfarin prior to heparin therapy)   ALUMINUM LEVEL   FERRITIN   ISTAT LACTATE   POCT GLUCOSE MONITORING CONTINUOUS   ISTAT CHEM8   POCT GLUCOSE MONITORING CONTINUOUS     EKG Readings: (Independently Interpreted)   Initial Reading: No STEMI. Previous EKG: Compared with most recent EKG Rhythm: Normal Sinus Rhythm. Heart Rate: 73. Axis: Normal.       Imaging Results              X-Ray Chest AP Portable (Final result)  Result time 02/04/24 17:25:25      Final result by Agustin Barba MD (02/04/24 17:25:25)                   Impression:      As above      Electronically signed by: Agustin Barba MD  Date:    02/04/2024  Time:    17:25               Narrative:    EXAMINATION:  XR CHEST AP PORTABLE    CLINICAL HISTORY:  Sepsis;    TECHNIQUE:  Single frontal view of the chest was performed.    COMPARISON:  12/02/2023    FINDINGS:  The cardiomediastinal silhouette is prominent, stable noting calcification of the aorta.  Left central venous catheter tip projects over the distal SVC..  There is no  pleural effusion.  The trachea is midline.  The lungs are symmetrically expanded bilaterally with coarse interstitial attenuation bilaterally suggesting edema..  No new large focal consolidation seen.  There is no pneumothorax.  The osseous structures are unchanged..                                       CT Head Without Contrast (Final result)  Result time 02/04/24 16:44:37      Final result by Agustin Barba MD (02/04/24 16:44:37)                   Impression:      1. Allowing for extensive motion artifact, no convincing acute intracranial abnormalities noting sequela of chronic microvascular ischemic change and senescent change.  2. Stable ventricular prominence, correlation with any history of normal pressure hydrocephalus.      Electronically signed by: Agustin Barba MD  Date:    02/04/2024  Time:    16:44               Narrative:    EXAMINATION:  CT HEAD WITHOUT CONTRAST    CLINICAL HISTORY:  Mental status change, unknown cause;unresponsive;    TECHNIQUE:  Low dose axial images were obtained through the head.  Coronal and sagittal reformations were also performed. Contrast was not administered.    COMPARISON:  11/20/2023    FINDINGS:  There is extensive motion artifact.    There is generalized cerebral volume loss.  There is hypoattenuation in a periventricular fashion, likely sequela of chronic microvascular ischemic change.  There is no evidence of acute major vascular territory infarct, hemorrhage, or mass.  There is stable ventricular prominence.  There are no abnormal extra-axial fluid collections.  The paranasal sinuses and mastoid air cells are clear, and there is no evidence of calvarial fracture.  The visualized soft tissues are unremarkable.                                    X-Rays:   Independently Interpreted Readings:   Chest X-Ray: Increased vascular markings consistent with CHF are present.  Cardiomegaly present. CVC on left side        Medications   insulin detemir U-100 (Levemir) pen 6  Units (6 Units Subcutaneous Given 2/4/24 8083)   sodium chloride 0.9% flush 10 mL (has no administration in time range)   naloxone 0.4 mg/mL injection 0.02 mg (has no administration in time range)   glucose chewable tablet 16 g (has no administration in time range)   glucose chewable tablet 24 g (has no administration in time range)   glucagon (human recombinant) injection 1 mg (has no administration in time range)   dextrose 10% bolus 125 mL 125 mL (has no administration in time range)   dextrose 10% bolus 250 mL 250 mL (has no administration in time range)   heparin 25,000 units in dextrose 5% 250 mL (100 units/mL) infusion LOW INTENSITY nomogram - [facility] (has no administration in time range)   heparin 25,000 units in dextrose 5% (100 units/ml) IV bolus from bag - ADDITIONAL PRN BOLUS - 60 units/kg (has no administration in time range)   heparin 25,000 units in dextrose 5% (100 units/ml) IV bolus from bag - ADDITIONAL PRN BOLUS - 30 units/kg (has no administration in time range)   glucagon (human recombinant) injection 1 mg (has no administration in time range)   insulin aspart U-100 pen 0-5 Units (has no administration in time range)   dextrose 10% bolus 125 mL 125 mL (has no administration in time range)   dextrose 10% bolus 250 mL 250 mL (has no administration in time range)   albuterol-ipratropium 2.5 mg-0.5 mg/3 mL nebulizer solution 3 mL (3 mLs Nebulization Given 2/4/24 1804)     Medical Decision Making  59 y/o female presenting with altered mental status. Began having hallucinations yesterday, today was not speaking or engaging at all with . Will open her eyes to sternal rub and localizes to pain.     Patient is afebrile, hemodynamically stable, and in no acute distress on arrival. Spo2 100% on RA.     Imaging and labs do not show any obvious reason for her altered mental status.  CT head is normal.  Chest x-ray shows some edema but no evidence of infection.   BUN is 37.  Ammonia 44.  Troponin BNP are  at patient's baseline or below.   VBG with normal PH, slightly elevated pCO2 at 60.  We gave her some DuoNebs for some very slight wheezing she had initially. Has had normal BP, HR, and Spo2 on room air.     Aluminum level checked as patients on HD can develop toxicity.   Patient with be admitted to hospital medicine for encephalopathy evaluation.   I discussed admission with hospital medicine who agreed to admit the patient for further evaluation and management.       Amount and/or Complexity of Data Reviewed  Labs: ordered. Decision-making details documented in ED Course.  Radiology: ordered and independent interpretation performed. Decision-making details documented in ED Course.  ECG/medicine tests: ordered and independent interpretation performed. Decision-making details documented in ED Course.    Risk  Prescription drug management.  Decision regarding hospitalization.               ED Course as of 02/05/24 0016   Sun Feb 04, 2024   1620 Called CT and asked for patient to be next for CT head  [OW]   1631 Additional history obtained from . Patient has not missed her MWF dialysis. She was alert yesterday but hallucinating people that were not there. Today, she was somnolent. They have traveled to here from [facility] and are in the process of moving here [DS]   1720 CT Head Without Contrast  Impression:     1. Allowing for extensive motion artifact, no convincing acute intracranial abnormalities noting sequela of chronic microvascular ischemic change and senescent change.  2. Stable ventricular prominence, correlation with any history of normal pressure hydrocephalus.   [OW]   1721 Hemoglobin(!): 11.7  anemia, improved from prior 1 month ago [OW]   1721 WBC: 4.79  No leukocytosis  [OW]      ED Course User Index  [DS] Abram Giron MD  [OW] Samantha Ontiveros MD                           Clinical Impression:  Final diagnoses:  [R41.89] Unresponsive  [G93.40] Encephalopathy          ED Disposition  Condition    Observation                 Samantha Ontiveros MD  Resident  02/05/24 0016

## 2024-02-05 ENCOUNTER — DOCUMENTATION ONLY (OUTPATIENT)
Dept: NEUROLOGY | Facility: CLINIC | Age: 59
End: 2024-02-05
Payer: MEDICARE

## 2024-02-05 PROBLEM — R41.89 UNRESPONSIVE: Status: ACTIVE | Noted: 2024-02-05

## 2024-02-05 LAB
ALBUMIN SERPL BCP-MCNC: 2.9 G/DL (ref 3.5–5.2)
ALP SERPL-CCNC: 324 U/L (ref 55–135)
ALT SERPL W/O P-5'-P-CCNC: 16 U/L (ref 10–44)
ANION GAP SERPL CALC-SCNC: 13 MMOL/L (ref 8–16)
APTT PPP: 29.7 SEC (ref 21–32)
APTT PPP: 30.2 SEC (ref 21–32)
AST SERPL-CCNC: 29 U/L (ref 10–40)
BASOPHILS # BLD AUTO: 0.1 K/UL (ref 0–0.2)
BASOPHILS # BLD AUTO: 0.1 K/UL (ref 0–0.2)
BASOPHILS NFR BLD: 2.4 % (ref 0–1.9)
BASOPHILS NFR BLD: 2.4 % (ref 0–1.9)
BILIRUB SERPL-MCNC: 0.6 MG/DL (ref 0.1–1)
BUN SERPL-MCNC: 41 MG/DL (ref 6–20)
CALCIUM SERPL-MCNC: 9.5 MG/DL (ref 8.7–10.5)
CHLORIDE SERPL-SCNC: 99 MMOL/L (ref 95–110)
CO2 SERPL-SCNC: 22 MMOL/L (ref 23–29)
CREAT SERPL-MCNC: 5.3 MG/DL (ref 0.5–1.4)
DIFFERENTIAL METHOD BLD: ABNORMAL
DIFFERENTIAL METHOD BLD: ABNORMAL
EOSINOPHIL # BLD AUTO: 0.5 K/UL (ref 0–0.5)
EOSINOPHIL # BLD AUTO: 0.5 K/UL (ref 0–0.5)
EOSINOPHIL NFR BLD: 11 % (ref 0–8)
EOSINOPHIL NFR BLD: 11 % (ref 0–8)
ERYTHROCYTE [DISTWIDTH] IN BLOOD BY AUTOMATED COUNT: 16.3 % (ref 11.5–14.5)
ERYTHROCYTE [DISTWIDTH] IN BLOOD BY AUTOMATED COUNT: 16.3 % (ref 11.5–14.5)
EST. GFR  (NO RACE VARIABLE): 8.9 ML/MIN/1.73 M^2
FERRITIN SERPL-MCNC: 1702 NG/ML (ref 20–300)
GLUCOSE SERPL-MCNC: 128 MG/DL (ref 70–110)
HCT VFR BLD AUTO: 38.9 % (ref 37–48.5)
HCT VFR BLD AUTO: 38.9 % (ref 37–48.5)
HGB BLD-MCNC: 12.5 G/DL (ref 12–16)
HGB BLD-MCNC: 12.5 G/DL (ref 12–16)
IMM GRANULOCYTES # BLD AUTO: 0.01 K/UL (ref 0–0.04)
IMM GRANULOCYTES # BLD AUTO: 0.01 K/UL (ref 0–0.04)
IMM GRANULOCYTES NFR BLD AUTO: 0.2 % (ref 0–0.5)
IMM GRANULOCYTES NFR BLD AUTO: 0.2 % (ref 0–0.5)
LYMPHOCYTES # BLD AUTO: 1.1 K/UL (ref 1–4.8)
LYMPHOCYTES # BLD AUTO: 1.1 K/UL (ref 1–4.8)
LYMPHOCYTES NFR BLD: 27.2 % (ref 18–48)
LYMPHOCYTES NFR BLD: 27.2 % (ref 18–48)
MAGNESIUM SERPL-MCNC: 2.2 MG/DL (ref 1.6–2.6)
MCH RBC QN AUTO: 31 PG (ref 27–31)
MCH RBC QN AUTO: 31 PG (ref 27–31)
MCHC RBC AUTO-ENTMCNC: 32.1 G/DL (ref 32–36)
MCHC RBC AUTO-ENTMCNC: 32.1 G/DL (ref 32–36)
MCV RBC AUTO: 97 FL (ref 82–98)
MCV RBC AUTO: 97 FL (ref 82–98)
MONOCYTES # BLD AUTO: 0.5 K/UL (ref 0.3–1)
MONOCYTES # BLD AUTO: 0.5 K/UL (ref 0.3–1)
MONOCYTES NFR BLD: 12.2 % (ref 4–15)
MONOCYTES NFR BLD: 12.2 % (ref 4–15)
NEUTROPHILS # BLD AUTO: 2 K/UL (ref 1.8–7.7)
NEUTROPHILS # BLD AUTO: 2 K/UL (ref 1.8–7.7)
NEUTROPHILS NFR BLD: 47 % (ref 38–73)
NEUTROPHILS NFR BLD: 47 % (ref 38–73)
NRBC BLD-RTO: 0 /100 WBC
NRBC BLD-RTO: 0 /100 WBC
PHOSPHATE SERPL-MCNC: 5.5 MG/DL (ref 2.7–4.5)
PLATELET # BLD AUTO: 186 K/UL (ref 150–450)
PLATELET # BLD AUTO: 186 K/UL (ref 150–450)
PMV BLD AUTO: 10.9 FL (ref 9.2–12.9)
PMV BLD AUTO: 10.9 FL (ref 9.2–12.9)
POCT GLUCOSE: 119 MG/DL (ref 70–110)
POCT GLUCOSE: 67 MG/DL (ref 70–110)
POCT GLUCOSE: 88 MG/DL (ref 70–110)
POCT GLUCOSE: 97 MG/DL (ref 70–110)
POTASSIUM SERPL-SCNC: 3.5 MMOL/L (ref 3.5–5.1)
PROT SERPL-MCNC: 7.9 G/DL (ref 6–8.4)
RBC # BLD AUTO: 4.03 M/UL (ref 4–5.4)
RBC # BLD AUTO: 4.03 M/UL (ref 4–5.4)
SARS-COV-2 RDRP RESP QL NAA+PROBE: NEGATIVE
SODIUM SERPL-SCNC: 134 MMOL/L (ref 136–145)
WBC # BLD AUTO: 4.19 K/UL (ref 3.9–12.7)
WBC # BLD AUTO: 4.19 K/UL (ref 3.9–12.7)

## 2024-02-05 PROCEDURE — U0002 COVID-19 LAB TEST NON-CDC: HCPCS | Mod: HCNC | Performed by: HOSPITALIST

## 2024-02-05 PROCEDURE — 85730 THROMBOPLASTIN TIME PARTIAL: CPT | Mod: HCNC | Performed by: STUDENT IN AN ORGANIZED HEALTH CARE EDUCATION/TRAINING PROGRAM

## 2024-02-05 PROCEDURE — 82962 GLUCOSE BLOOD TEST: CPT | Mod: HCNC

## 2024-02-05 PROCEDURE — 80053 COMPREHEN METABOLIC PANEL: CPT | Mod: HCNC | Performed by: STUDENT IN AN ORGANIZED HEALTH CARE EDUCATION/TRAINING PROGRAM

## 2024-02-05 PROCEDURE — G0378 HOSPITAL OBSERVATION PER HR: HCPCS | Mod: HCNC

## 2024-02-05 PROCEDURE — 83735 ASSAY OF MAGNESIUM: CPT | Mod: HCNC | Performed by: STUDENT IN AN ORGANIZED HEALTH CARE EDUCATION/TRAINING PROGRAM

## 2024-02-05 PROCEDURE — 85025 COMPLETE CBC W/AUTO DIFF WBC: CPT | Mod: HCNC | Performed by: STUDENT IN AN ORGANIZED HEALTH CARE EDUCATION/TRAINING PROGRAM

## 2024-02-05 PROCEDURE — 25000003 PHARM REV CODE 250: Mod: HCNC | Performed by: HOSPITALIST

## 2024-02-05 PROCEDURE — 95816 EEG AWAKE AND DROWSY: CPT | Mod: HCNC

## 2024-02-05 PROCEDURE — 99214 OFFICE O/P EST MOD 30 MIN: CPT | Mod: HCNC,,, | Performed by: NURSE PRACTITIONER

## 2024-02-05 PROCEDURE — 96372 THER/PROPH/DIAG INJ SC/IM: CPT | Performed by: HOSPITALIST

## 2024-02-05 PROCEDURE — 95816 EEG AWAKE AND DROWSY: CPT | Mod: 26,HCNC,, | Performed by: PSYCHIATRY & NEUROLOGY

## 2024-02-05 PROCEDURE — 84100 ASSAY OF PHOSPHORUS: CPT | Mod: HCNC | Performed by: STUDENT IN AN ORGANIZED HEALTH CARE EDUCATION/TRAINING PROGRAM

## 2024-02-05 PROCEDURE — 63600175 PHARM REV CODE 636 W HCPCS: Mod: HCNC | Performed by: STUDENT IN AN ORGANIZED HEALTH CARE EDUCATION/TRAINING PROGRAM

## 2024-02-05 PROCEDURE — 85730 THROMBOPLASTIN TIME PARTIAL: CPT | Mod: 91,HCNC | Performed by: HOSPITALIST

## 2024-02-05 RX ORDER — CLOPIDOGREL BISULFATE 75 MG/1
75 TABLET ORAL DAILY
Status: DISCONTINUED | OUTPATIENT
Start: 2024-02-06 | End: 2024-02-05

## 2024-02-05 RX ORDER — ATORVASTATIN CALCIUM 40 MG/1
80 TABLET, FILM COATED ORAL DAILY
Status: DISCONTINUED | OUTPATIENT
Start: 2024-02-05 | End: 2024-02-07 | Stop reason: HOSPADM

## 2024-02-05 RX ORDER — CLOPIDOGREL BISULFATE 75 MG/1
75 TABLET ORAL DAILY
Status: DISCONTINUED | OUTPATIENT
Start: 2024-02-05 | End: 2024-02-07 | Stop reason: HOSPADM

## 2024-02-05 RX ORDER — SODIUM BICARBONATE 325 MG/1
650 TABLET ORAL 3 TIMES DAILY
Status: DISCONTINUED | OUTPATIENT
Start: 2024-02-05 | End: 2024-02-07 | Stop reason: HOSPADM

## 2024-02-05 RX ADMIN — HEPARIN SODIUM AND DEXTROSE 12 UNITS/KG/HR: 10000; 5 INJECTION INTRAVENOUS at 12:02

## 2024-02-05 RX ADMIN — SODIUM BICARBONATE 650 MG: 325 TABLET ORAL at 09:02

## 2024-02-05 RX ADMIN — CLOPIDOGREL BISULFATE 75 MG: 75 TABLET ORAL at 05:02

## 2024-02-05 RX ADMIN — INSULIN DETEMIR 2 UNITS: 100 INJECTION, SOLUTION SUBCUTANEOUS at 09:02

## 2024-02-05 RX ADMIN — ATORVASTATIN CALCIUM 80 MG: 40 TABLET, FILM COATED ORAL at 05:02

## 2024-02-05 RX ADMIN — APIXABAN 5 MG: 5 TABLET, FILM COATED ORAL at 09:02

## 2024-02-05 NOTE — PLAN OF CARE
Sw was unable to complete an assessment. Pt was asleep, sw will try at a later time.            Giselle Prakash LMSW  Case Management  Emergency Department  [phone]

## 2024-02-05 NOTE — CARE UPDATE
Patient is seen, apparently encephalopathy is now resolving.  Oriented to self and place but not to time.    She does not know exactly why she came into the hospital.  She passed nursing bedside swallow evaluation.      MRI indicates possibility of tiny infarct in bilateral corona radiata.  EEG showing nonspecific encephalopathy, no definitive epileptiform activity    Clear lungs bilaterally, unlabored breathing, on room air, no cyanosis   Heart sounds indicate a regular rate and rhythm  Awake alert, no acute distress  No facial droop, no slurred speech   Extraocular motions intact   5/5 fist  bilaterally     Fall precautions, resuming home medications    Tried calling  and other family member, no pickup

## 2024-02-05 NOTE — ASSESSMENT & PLAN NOTE
Creatine stable for now. BMP reviewed- noted Estimated Creatinine Clearance: 9.2 mL/min (A) (based on SCr of 5.4 mg/dL (H)). according to latest data. Based on current GFR, CKD stage is end stage.  Monitor UOP and serial BMP and adjust therapy as needed. Renally dose meds. Avoid nephrotoxic medications and procedures.    She gets HD on MWF.

## 2024-02-05 NOTE — ASSESSMENT & PLAN NOTE
TTE  The left ventricle is mildly enlarged with severely decreased systolic function. The estimated ejection fraction is 15%.  There is severe left ventricular global hypokinesis.  Normal right ventricular size with low normal right ventricular systolic function.  Grade I left ventricular diastolic dysfunction.  Biatrial enlargement.  Mild-to-moderate mitral regurgitation.  Severe tricuspid regurgitation.  The estimated PA systolic pressure is 43 mmHg.  Elevated central venous pressure (15 mmHg).      Plan:  - HD, continue home furosemide  - Avoid salt intake > 2 g/day  - Strict I's/O's  - magnesium > 2, potassium > 4  - Goal directed medical therapy as indicated including:    - carvedilol   - hydralazine/isosorbide dinitrate  - Fluid restriction to 1.5L

## 2024-02-05 NOTE — ED NOTES
Assumed care of the patient. Report received from DEANGELO Fritz. Pt on continuous cardiac monitoring, continuous pulse oximetry, and automatic BP cuff cycling Q30min. Pt in hospital gown, side rails up X2, bed low and locked, and call light is placed within reach. No family/visitors at bedside at this time. Pt denies any complaints or needs.

## 2024-02-05 NOTE — PROGRESS NOTES
Pharmacist Renal Dose Adjustment Note    Suyapa Connelly is a 58 y.o. female being treated with famotidine 20 mg every 12 hours    Patient Data:    Vital Signs (Most Recent):  Temp: 97.4 °F (36.3 °C) (02/04/24 1557)  Pulse: 73 (02/04/24 2033)  Resp: 14 (02/04/24 2033)  BP: (!) 174/81 (02/04/24 2033)  SpO2: 95 % (02/04/24 2033) Vital Signs (72h Range):  Temp:  [97.4 °F (36.3 °C)]   Pulse:  [68-73]   Resp:  [12-25]   BP: (152-174)/()   SpO2:  [94 %-98 %]      Recent Labs   Lab 02/04/24  1841   CREATININE 5.4*     Serum creatinine: 5.4 mg/dL (H) 02/04/24 1841  Estimated creatinine clearance: 9.2 mL/min (A)    Adjusted to  Famotidine 20 mg every 24 hours, per pharmacy protocol     Pharmacist's Name: Claudette Saha  Pharmacist's Extension: [phone]

## 2024-02-05 NOTE — ASSESSMENT & PLAN NOTE
"Patient's anemia is currently controlled. Has not received any PRBCs to date. Etiology likely d/t chronic disease due to Chronic Kidney Disease/ESRD  Current CBC reviewed-   Lab Results   Component Value Date    HGB 12.0 02/04/2024    HCT 38.2 02/04/2024     Monitor serial CBC and transfuse if patient becomes hemodynamically unstable, symptomatic or H/H drops below 7/21.      Plan:  - CBC with differential, reticulocyte count, iron studies, CMP  POC Hematocrit   Date Value Ref Range Status   02/04/2024 36 36 - 54 %PCV Final     Hematocrit   Date Value Ref Range Status   02/04/2024 38.2 37.0 - 48.5 % Final     MCH   Date Value Ref Range Status   02/04/2024 30.3 27.0 - 31.0 pg Final     MCV   Date Value Ref Range Status   02/04/2024 97 82 - 98 fL Final     Retic   Date Value Ref Range Status   02/04/2022 2.0 0.5 - 2.5 % Final   No results found for: "BILIRUTOT"  "

## 2024-02-05 NOTE — CONSULTS
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 Per EMR She is accompanied by her  who states that the patient woke up this am and was completely unresponsive to verbal cues this morning. Day prior, the patient had been having visual hallucinations Claritin her  where she was trying to talk to people in the room who were not there.  Seemed as though that she was not having full conversations at the time. Pt is alert and oriented during my exam. Last HD prior to presentation was Saturday 2/3. Of note she had there was 1 prior episode of this happening that was related to gabapentin overdose however she has not been taking any gabapentin in his instead using only pregabalin. She also had her quetiapine doubled approximately 1 month ago and was prescribed a new medication for sleep but is unclear as to which one.  A CT head showed chronic microvascular changes but no acute changes and a chest x-ray was largely unremarkable outside of some interstitial infiltrates suggestive of early pulmonary edema. Nephrology consulted for ESRD.     HPI obtained via EMR and patient interview     Past Medical History:   Diagnosis Date    Anxiety     Chronic pain syndrome     CKD (chronic kidney disease), stage III     Depression     Diabetes mellitus      type 2    Diabetes mellitus, type 2     GERD (gastroesophageal reflux disease)     Hyperemesis 3/23/2021    Hypokalemia 3/23/2021    Infection of below knee amputation stump 3/12/2022    Osteomyelitis     Osteomyelitis of left foot 4/30/2021    Ulcer of left foot     Vaginal delivery     x1       Past Surgical History:   Procedure Laterality Date    ABOVE-KNEE AMPUTATION Left 5/18/2021    Procedure: AMPUTATION, ABOVE KNEE;  Surgeon: Teddy Huber MD;  Location: [facility] OR [address] [place] [address];  Service: Vascular;  Laterality: Left;    ABOVE-KNEE AMPUTATION Right 3/18/2022    Procedure: AMPUTATION, ABOVE KNEE;  Surgeon: DAYNE Florez II, MD;  [facility];  Service: Vascular;  Laterality: Right;    Angiogram - Right Extremity Right 7/9/15    angiogram-left leg  10/6/15    ANGIOGRAPHY OF LOWER EXTREMITY Left 4/29/2021    Procedure: ANGIOGRAM, LOWER EXTREMITY;  Surgeon: Teddy Huber MD;  Location: [facility] OR [address] [place] [address];  Service: Vascular;  Laterality: Left;    BELOW KNEE AMPUTATION OF LOWER EXTREMITY Right 12/28/2021    Procedure: AMPUTATION, BELOW KNEE;  Surgeon: Kaitlyn Rojas MD;  Location: [facility] OR;  Service: General;  Laterality: Right;    CATHETERIZATION OF BOTH LEFT AND RIGHT HEART N/A 12/18/2019    Procedure: CATHETERIZATION, HEART, BOTH LEFT AND RIGHT;  Surgeon: Que Fernando III, MD;  Location: [facility] CATH LAB;  Service: Cardiology;  Laterality: N/A;    CORONARY ANGIOGRAPHY N/A 12/18/2019    Procedure: ANGIOGRAM, CORONARY ARTERY;  Surgeon: Que Fernando III, MD;  Location: [facility] CATH LAB;  Service: Cardiology;  Laterality: N/A;    CORONARY ANGIOGRAPHY INCLUDING BYPASS GRAFTS WITH CATHETERIZATION OF LEFT HEART N/A 7/28/2020    Procedure: ANGIOGRAM, CORONARY, INCLUDING BYPASS GRAFT, WITH LEFT HEART CATHETERIZATION, 9 am;  Surgeon: Rachel Easley MD;  Location: [facility] CATH LAB;  Service: Cardiology;  Laterality: N/A;    CORONARY ARTERY BYPASS GRAFT (CABG) N/A 1/14/2020    Procedure:  CORONARY ARTERY BYPASS GRAFT (CABG) x 1     Off Pump;  Surgeon: Huang Altamirano MD;  Location: [address];  Service: Cardiovascular;  Laterality: N/A;    CREATION OF FEMORAL-TIBIAL ARTERY BYPASS Left 4/29/2021    Procedure: CREATION, BYPASS, ARTERIAL, FEMORAL TO ANTERIOR TIBIAL;  Surgeon: Teddy Huber MD;  Location: [address];  Service: Vascular;  Laterality: Left;    CREATION OF FEMOROPOPLITEAL ARTERIAL BYPASS USING GRAFT Left 8/18/2020    Procedure: CREATION, BYPASS, ARTERIAL, FEMORAL TO POPLITEAL, USING GRAFT, LEFT LOWER EXTREMITY;  Surgeon: Teddy Huber MD;  Location: [facility];  Service: Vascular;  Laterality: Left;  REQUEST 7:15 A.M. START----COVID NEGATIVE ON 8/17 1ST CASE STARTKENYA PER LEANA ON 8/7/2020 @ 942AM-LO  RN PREOP 8/12/2020   T/S-----CLEARED BY CARDS-------PENDING INSURANCE    DEBRIDEMENT OF FOOT Left 9/8/2020    Procedure: DEBRIDEMENT, FOOT;  Surgeon: Rosio Mayes DPM;  Location: [facility];  Service: Podiatry;  Laterality: Left;  request neoxx .   RN Pre Op 9-4-2020, Covid negative on 9/5/20. C A    DEBRIDEMENT OF FOOT  3/4/2021    Procedure: DEBRIDEMENT, FOOT;  Surgeon: Teddy Huber MD;  Location: [facility];  Service: Vascular;;    DEBRIDEMENT OF FOOT Left 3/9/2021    Procedure: DEBRIDEMENT, FOOT, bone biopsy;  Surgeon: Rosio Mayes DPM;  Location: [facility] OR;  Service: Podiatry;  Laterality: Left;  Request neoxx---COVID IN AM  REQUESTING NOON START  RN Phone Pre op.On Blood thinners Plavix and Eliquis.  Covid am of surgery. C A    DEBRIDEMENT OF FOOT Left 5/4/2021    Procedure: DEBRIDEMENT, FOOT;  Surgeon: Farooq Morley DPM;  Location: [address];  Service: Podiatry;  Laterality: Left;    INSERTION OF TUNNELED CENTRAL VENOUS HEMODIALYSIS CATHETER N/A 1/27/2020    Procedure: Insertion, Catheter, Central Venous, Hemodialysis;  Surgeon: ESTEBAN Gomez III, MD;  Location: [facility] CATH LAB;  Service: Peripheral Vascular;  Laterality: N/A;    INSERTION OF  TUNNELED CENTRAL VENOUS HEMODIALYSIS CATHETER  5/10/2023    Procedure: Insertion, Catheter, Central Venous, Hemodialysis;  Surgeon: Romulo Queen MD;  Location: [facility] CATH LAB;  Service: Cardiology;;    PERCUTANEOUS TRANSLUMINAL ANGIOPLASTY N/A 3/4/2021    Procedure: PTA (ANGIOPLASTY, PERCUTANEOUS, TRANSLUMINAL);  Surgeon: Teddy Huber MD;  Location: [facility] OR;  Service: Vascular;  Laterality: N/A;    REMOVAL OF ARTERIOVENOUS GRAFT Left 5/27/2021    Procedure: REMOVAL, GRAFT, LEFT LOWER EXTREMITY, WOUND EXPLORATION;  Surgeon: Teddy Huber MD;  Location: [facility] OR 2ND FLR;  Service: Vascular;  Laterality: Left;    REMOVAL OF NAIL OF DIGIT Left 3/9/2021    Procedure: REMOVAL, NAIL, DIGIT;  Surgeon: Rosio Mayes DPM;  Location: [facility] OR;  Service: Podiatry;  Laterality: Left;    RIGHT HEART CATHETERIZATION Right 5/10/2023    Procedure: INSERTION, CATHETER, RIGHT HEART;  Surgeon: Romulo Queen MD;  Location: [facility] CATH LAB;  Service: Cardiology;  Laterality: Right;    THROMBECTOMY Left 3/4/2021    Procedure: THROMBECTOMY, LEFT LOWER EXTREMITY BYPASS GRAFT, ANGIOGRAM, POSSIBLE INTERVENTION, POSSIBLE LEFT LOWER EXTREMITY BYPASS;  Surgeon: Teddy Huber MD;  Location: [facility] OR;  Service: Vascular;  Laterality: Left;    THROMBECTOMY Left 4/29/2021    Procedure: GRAFT THROMBECTOMY, LEFT LOWER EXTREMITY;  Surgeon: Teddy Huber MD;  Location: [facility] OR 2ND FLR;  Service: Vascular;  Laterality: Left;  14.5 min  1179.85 mGy  341.01 Gycm2  240 ml dye    THROMBECTOMY  10/22/2021    Procedure: THROMBECTOMY;  Surgeon: Saad Arenas MD;  Location: [facility] CATH LAB/EP;  Service: Cardiology;;       Review of patient's allergies indicates:   Allergen Reactions    Ciprofloxacin Itching    Contrast media      Kidney injury    Iodine      Kidney injury    Pcn [penicillins]      Rash; tolerated ceftriaxone on 1/13/20     Current Facility-Administered Medications   Medication Frequency    dextrose 10% bolus  "125 mL 125 mL PRN    dextrose 10% bolus 125 mL 125 mL PRN    dextrose 10% bolus 250 mL 250 mL PRN    dextrose 10% bolus 250 mL 250 mL PRN    glucagon (human recombinant) injection 1 mg PRN    glucagon (human recombinant) injection 1 mg PRN    glucose chewable tablet 16 g PRN    glucose chewable tablet 24 g PRN    insulin aspart U-100 pen 0-5 Units Q6H PRN    insulin detemir U-100 (Levemir) pen 6 Units QHS    naloxone 0.4 mg/mL injection 0.02 mg PRN    sodium chloride 0.9% flush 10 mL PRN     Current Outpatient Medications   Medication    allopurinoL (ZYLOPRIM) 100 MG tablet    apixaban (ELIQUIS) 5 mg Tab    atorvastatin (LIPITOR) 80 MG tablet    blood sugar diagnostic Strp    blood-glucose meter Misc    calcitRIOL (ROCALTROL) 0.5 MCG Cap    calcium acetate,phosphat bind, (PHOSLO) 667 mg capsule    carvediloL (COREG) 6.25 MG tablet    clopidogreL (PLAVIX) 75 mg tablet    famotidine (PEPCID) 20 MG tablet    furosemide (LASIX) 40 MG tablet    hydrALAZINE (APRESOLINE) 50 MG tablet    hydrOXYzine HCL (ATARAX) 25 MG tablet    insulin aspart, niacinamide, (FIASP FLEXTOUCH U-100 INSULIN) 100 unit/mL (3 mL) InPn    insulin detemir U-100, Levemir, 100 unit/mL (3 mL) SubQ InPn pen    isosorbide dinitrate (ISORDIL) 20 MG tablet    lancets Misc    pen needle, diabetic 32 gauge x 5/32" Ndle    pregabalin (LYRICA) 100 MG capsule    sertraline (ZOLOFT) 100 MG tablet    sevelamer carbonate (RENVELA) 800 mg Tab    sodium bicarbonate 650 MG tablet    traZODone (DESYREL) 50 MG tablet     Family History       Problem Relation (Age of Onset)    Diabetes Mother, Father, Paternal Grandmother    Heart disease Maternal Grandmother    No Known Problems Maternal Grandfather, Paternal Grandfather          Tobacco Use    Smoking status: Former    Smokeless tobacco: Never   Substance and Sexual Activity    Alcohol use: No    Drug use: Yes     Types: Marijuana     Comment: occassional    Sexual activity: Yes     Partners: Male     Review of " Systems   Constitutional: Negative.    HENT: Negative.     Eyes: Negative.    Cardiovascular: Negative.    Gastrointestinal: Negative.    Endocrine: Negative.    Genitourinary: Negative.    Musculoskeletal: Negative.    Neurological: Negative.    Psychiatric/Behavioral:  Positive for confusion.      Objective:     Vital Signs (Most Recent):  Temp: 98 °F (36.7 °C) (02/05/24 1200)  Pulse: 87 (02/05/24 1200)  Resp: 19 (02/05/24 1200)  BP: 114/69 (02/05/24 1200)  SpO2: 95 % (02/05/24 1200) Vital Signs (24h Range):  Temp:  [97.4 °F (36.3 °C)-98.5 °F (36.9 °C)] 98 °F (36.7 °C)  Pulse:  [68-87] 87  Resp:  [12-25] 19  SpO2:  [94 %-100 %] 95 %  BP: (114-174)/() 114/69     Weight: 78 kg (171 lb 15.3 oz) (02/04/24 1557)  Body mass index is 52.47 kg/m².  Body surface area is 1.63 meters squared.    I/O last 3 completed shifts:  In: -   Out: 250 [Urine:250]     Physical Exam  Vitals and nursing note reviewed.   Constitutional:       General: She is not in acute distress.     Appearance: She is ill-appearing. She is not toxic-appearing.   HENT:      Head: Normocephalic and atraumatic.      Mouth/Throat:      Mouth: Mucous membranes are dry.      Pharynx: Oropharynx is clear.   Eyes:      General: No scleral icterus.     Conjunctiva/sclera: Conjunctivae normal.   Cardiovascular:      Rate and Rhythm: Normal rate. Rhythm irregular.      Pulses: Normal pulses.      Heart sounds: No murmur heard.  Pulmonary:      Effort: Pulmonary effort is normal.   Abdominal:      General: There is no distension.      Palpations: Abdomen is soft.      Tenderness: There is no abdominal tenderness.   Musculoskeletal:      Right lower leg: No edema.      Left lower leg: No edema.      Comments: Bilateral AKA   Skin:     General: Skin is warm and dry.   Neurological:      Mental Status: She is alert. She is disoriented.      Comments: Reflexes were intact   Psychiatric:         Behavior: Behavior is cooperative.      Significant Labs:  CBC:    Recent Labs   Lab 02/05/24  0506   WBC 4.19  4.19   RBC 4.03  4.03   HGB 12.5  12.5   HCT 38.9  38.9     186   MCV 97  97   MCH 31.0  31.0   MCHC 32.1  32.1     CMP:   Recent Labs   Lab 02/05/24  0506   *   CALCIUM 9.5   ALBUMIN 2.9*   PROT 7.9   *   K 3.5   CO2 22*   CL 99   BUN 41*   CREATININE 5.3*   ALKPHOS 324*   ALT 16   AST 29   BILITOT 0.6     All labs within the past 24 hours have been reviewed.    Assessment/Plan:     Neuro  * Encephalopathy  -management per primary     Renal/  ESRD (end stage renal disease)  Nephrology History  iHD Schedule: MWF   Unit/MD: [facility]  Duration: 3 hours   UF: 3-4  EDW: 76 kg   Access: LIJ TDC   Residual Renal Function: oliguric   Last HD prior to presentation: 2/3/24    Assessment:   - No acute indication for RRT plan for HD tomorrow 2/6/24    -Continue home phos binders  - Dialysate adjusted to current labs   - Will obtain OP dialysis records  - Continue to monitor intake and output, daily weights   - Avoid nephrotoxic medication and renal dose medications to GFR                  Thank you for your consult. I will follow-up with patient. Please contact us if you have any additional questions.    Katie Monique DNP  Nephrology  [facility] - Emergency Dept

## 2024-02-05 NOTE — ASSESSMENT & PLAN NOTE
Patient with Permanent atrial fibrillation which is controlled currently with Beta Blocker. Patient is currently in atrial fibrillation.[id number] Score: 3. HASBLED Score: 3. Anticoagulation indicated. Anticoagulation done with heparin due to strict NPO .      Plan:  - Rate control with target HR <110  - carvedilol  - Patient's [id number] score is 3, annual risk of stroke is 6.7  - Score > 1 requires anticoagulation preferably with Unfractionated Heparin  - HAS-BLED Score:  - Hypertension (+1)  Yes  - Impaired Renal Function (Dialysis, transplant, Cr >2.26 mg/dL) (+1)  Yes  - Impaired Liver Function (Cirrhosis or bilirubin >2x normal with AST/ALT/AP >3x normal) (+1)  No  - History of Stroke (+1)  No  - History of Bleeding (+1)  No  - Labile INRs (Unstable/high INRs, time in therapeutic range <60%) (+1)  No  - >65 years (+1)  No  - Drugs (Antiplatelet agents, NSAIDs) (+1)  Yes  - Alcohol Consumption (+1)  No    - Total Score:  3  - Risk of bleedin.8%  - Magnesium > 2, Potassium > 4

## 2024-02-05 NOTE — ASSESSMENT & PLAN NOTE
Chronic, uncontrolled. Latest blood pressure and vitals reviewed-     Temp:  [97.4 °F (36.3 °C)-98.3 °F (36.8 °C)]   Pulse:  [68-77]   Resp:  [12-25]   BP: (138-174)/()   SpO2:  [94 %-100 %] .   Home meds for hypertension were reviewed and noted below.   Hypertension Medications               carvediloL (COREG) 6.25 MG tablet Take 1 tablet (6.25 mg total) by mouth 2 (two) times daily.    furosemide (LASIX) 40 MG tablet Take 1 tablet (40 mg total) by mouth 2 (two) times a day.    hydrALAZINE (APRESOLINE) 50 MG tablet Take 1 tablet (50 mg total) by mouth 3 (three) times daily.    isosorbide dinitrate (ISORDIL) 20 MG tablet Take 2 tablets (40 mg total) by mouth 3 (three) times daily.            While in the hospital, will manage blood pressure as follows; Adjust home antihypertensive regimen as follows- stop all oral antihypertensives    Will utilize p.r.n. blood pressure medication only if patient's blood pressure greater than 180/110 and she develops symptoms such as worsening chest pain or shortness of breath.

## 2024-02-05 NOTE — ASSESSMENT & PLAN NOTE
Patient with Chronic debility due to  bilateral above knee amputation . Latest AMPAC and GEMS scores have not been reviewed.  Impact score 6.  Evaluation for etiology is underway. Plan includes fall precautions in place.

## 2024-02-05 NOTE — ASSESSMENT & PLAN NOTE
Body mass index is 52.47 kg/m². Morbid obesity complicates all aspects of disease management from diagnostic modalities to treatment. Weight loss encouraged and health benefits explained to patient.    BMI was adjusted for AKA

## 2024-02-05 NOTE — ASSESSMENT & PLAN NOTE
Patient's FSGs are uncontrolled due to hyperglycemia on current medication regimen.  Last A1c reviewed-   Lab Results   Component Value Date    HGBA1C 9.7 (H) 02/04/2024     Most recent fingerstick glucose reviewed-   Recent Labs   Lab 02/04/24  1558 02/04/24  1711 02/04/24  2328   POCTGLUCOSE 235* 252* 199*     Current correctional scale  Low  Maintain anti-hyperglycemic dose as follows-   Antihyperglycemics (From admission, onward)      Start     Stop Route Frequency Ordered    02/04/24 2249  insulin aspart U-100 pen 0-5 Units         -- SubQ Every 6 hours PRN 02/04/24 2149    02/04/24 2200  insulin detemir U-100 (Levemir) pen 6 Units         -- SubQ Nightly 02/04/24 2056          Hold Oral hypoglycemics while patient is in the hospital.

## 2024-02-05 NOTE — H&P
Andrew Andrade - Emergency Dept  [facility] Medicine  History & Physical    Patient Name: Suyapa Connelly  MRN: [id number]  Patient Class: OP- Observation  Admission Date: 2/4/2024  Attending Physician: Umair Bowens MD   Primary Care Provider: Magen Christensen MD         Patient information was obtained from patient, past medical records, and ER records.     Subjective:     Principal Problem:Encephalopathy    Chief Complaint:   Chief Complaint   Patient presents with    Altered Mental Status     Responds to pain, max amputee         HPI: Patient is a 58-year-old female with complex past medical history significant for type 2 diabetes, ESRD with HD MWF, GERD, BKA following osteomyelitis of the left foot, hypertension who is presenting to the emergency room with a 24 hour history of altered mental status.  She is accompanied by her  who states that the patient woke up this am and was completely unresponsive to verbal cues this morning.  Day prior, the patient had been having visual hallucinations Claritin her  where she was trying to talk to people in the room who were not there.  Seemed as though that she was not having full conversations at the time.  The patient was able to take evening medications and then went to bed.  This morning the patient is completely unresponsive to stimuli and has been sleeping the entire day.  Occasionally she will open her eyes to stimuli and apparently she was able to squeeze her hand when asked by the emergency physician however I did not experience this.  There was no convulsive episode prior to this.  A CT of her head was negative.     states that there was 1 prior episode of this happening that was related to gabapentin overdose however she has not been taking any gabapentin in his instead using only pregabalin. She also had her quetiapine doubled approximately 1 month ago and was prescribed a new medication for sleep but is unclear as to which one.  In the  emergency room vitals were relatively stable except for some mild hypertension.  A metabolic panel showed mild hyponatremia of 134, a BUN and creatinine of 37 and 5.4, alkaline phosphatase of 339, magnesium 2.2, ammonia was 44, troponin was 0.047 and a BNP was elevated at 1024 although it is unclear what her baseline is given her ESRD.  A CBC was collected which showed some mild but at goal anemia of 11.7.  A CT head showed chronic microvascular changes but no acute changes and a chest x-ray was largely unremarkable outside of some interstitial infiltrates suggestive of early pulmonary edema which correlates with her BNP elevation..  Blood cultures were drawn but no empiric antibiotics were given.  A UA is pending but it is unclear if the patient makes urine at all.      Past Medical History:   Diagnosis Date    Anxiety     Chronic pain syndrome     CKD (chronic kidney disease), stage III     Depression     Diabetes mellitus     type 2    Diabetes mellitus, type 2     GERD (gastroesophageal reflux disease)     Hyperemesis 3/23/2021    Hypokalemia 3/23/2021    Infection of below knee amputation stump 3/12/2022    Osteomyelitis     Osteomyelitis of left foot 4/30/2021    Ulcer of left foot     Vaginal delivery     x1       Past Surgical History:   Procedure Laterality Date    ABOVE-KNEE AMPUTATION Left 5/18/2021    Procedure: AMPUTATION, ABOVE KNEE;  Surgeon: Teddy Huber MD;  Location: [facility] OR [address] [place] [address];  Service: Vascular;  Laterality: Left;    ABOVE-KNEE AMPUTATION Right 3/18/2022    Procedure: AMPUTATION, ABOVE KNEE;  Surgeon: DAYNE Florez II, MD;  Location: [facility] OR [address] [place] [address];  Service: Vascular;  Laterality: Right;    Angiogram - Right Extremity Right 7/9/15    angiogram-left leg  10/6/15    ANGIOGRAPHY OF LOWER EXTREMITY Left 4/29/2021    Procedure: ANGIOGRAM, LOWER EXTREMITY;  Surgeon: Teddy Huber MD;  Location: [facility] OR [address] [place] [address];  Service: Vascular;  Laterality: Left;    BELOW KNEE AMPUTATION  OF LOWER EXTREMITY Right 12/28/2021    Procedure: AMPUTATION, BELOW KNEE;  Surgeon: Kaitlyn Rojas MD;  Location: [facility] OR;  Service: General;  Laterality: Right;    CATHETERIZATION OF BOTH LEFT AND RIGHT HEART N/A 12/18/2019    Procedure: CATHETERIZATION, HEART, BOTH LEFT AND RIGHT;  Surgeon: Que Fernando III, MD;  Location: [facility] CATH LAB;  Service: Cardiology;  Laterality: N/A;    CORONARY ANGIOGRAPHY N/A 12/18/2019    Procedure: ANGIOGRAM, CORONARY ARTERY;  Surgeon: Que Fernando III, MD;  Location: [facility] CATH LAB;  Service: Cardiology;  Laterality: N/A;    CORONARY ANGIOGRAPHY INCLUDING BYPASS GRAFTS WITH CATHETERIZATION OF LEFT HEART N/A 7/28/2020    Procedure: ANGIOGRAM, CORONARY, INCLUDING BYPASS GRAFT, WITH LEFT HEART CATHETERIZATION, 9 am;  Surgeon: Rachel Easley MD;  Location: [facility] CATH LAB;  Service: Cardiology;  Laterality: N/A;    CORONARY ARTERY BYPASS GRAFT (CABG) N/A 1/14/2020    Procedure: CORONARY ARTERY BYPASS GRAFT (CABG) x 1     Off Pump;  Surgeon: Huang Altamirano MD;  Location: [facility] OR [address] [place] [address];  Service: Cardiovascular;  Laterality: N/A;    CREATION OF FEMORAL-TIBIAL ARTERY BYPASS Left 4/29/2021    Procedure: CREATION, BYPASS, ARTERIAL, FEMORAL TO ANTERIOR TIBIAL;  Surgeon: Teddy Huber MD;  [facility];  Service: Vascular;  Laterality: Left;    CREATION OF FEMOROPOPLITEAL ARTERIAL BYPASS USING GRAFT Left 8/18/2020    Procedure: CREATION, BYPASS, ARTERIAL, FEMORAL TO POPLITEAL, USING GRAFT, LEFT LOWER EXTREMITY;  Surgeon: Teddy Huber MD;  Location: [facility];  Service: Vascular;  Laterality: Left;  REQUEST 7:15 A.M. START----COVID NEGATIVE ON 8/17  1ST CASE STARTE PER LEANA ON 8/7/2020 @ 942AM-LO  RN PREOP 8/12/2020   T/S-----CLEARED BY CARDS-------PENDING INSURANCE    DEBRIDEMENT OF FOOT Left 9/8/2020    Procedure: DEBRIDEMENT, FOOT;  Surgeon: Rosio Mayes DPM;  Location: [facility];  Service: Podiatry;  Laterality: Left;  request neoxx .    RN Pre Op 9-4-2020, Covid negative on 9/5/20. C A    DEBRIDEMENT OF FOOT  3/4/2021    Procedure: DEBRIDEMENT, FOOT;  Surgeon: Teddy Huber MD;  Location: [facility] OR;  Service: Vascular;;    DEBRIDEMENT OF FOOT Left 3/9/2021    Procedure: DEBRIDEMENT, FOOT, bone biopsy;  Surgeon: Rosio Mayes DPM;  Location: [facility] OR;  Service: Podiatry;  Laterality: Left;  Request neoxx---COVID IN AM  REQUESTING NOON START  RN Phone Pre op.On Blood thinners Plavix and Eliquis.  Covid am of surgery. C A    DEBRIDEMENT OF FOOT Left 5/4/2021    Procedure: DEBRIDEMENT, FOOT;  Surgeon: Farooq Morley DPM;  [address];  Service: Podiatry;  Laterality: Left;    INSERTION OF TUNNELED CENTRAL VENOUS HEMODIALYSIS CATHETER N/A 1/27/2020    Procedure: Insertion, Catheter, Central Venous, Hemodialysis;  Surgeon: ESTEBAN Gomez III, MD;  Location: [facility] CATH LAB;  Service: Peripheral Vascular;  Laterality: N/A;    INSERTION OF TUNNELED CENTRAL VENOUS HEMODIALYSIS CATHETER  5/10/2023    Procedure: Insertion, Catheter, Central Venous, Hemodialysis;  Surgeon: Romulo Queen MD;  Location: [facility] CATH LAB;  Service: Cardiology;;    PERCUTANEOUS TRANSLUMINAL ANGIOPLASTY N/A 3/4/2021    Procedure: PTA (ANGIOPLASTY, PERCUTANEOUS, TRANSLUMINAL);  Surgeon: Teddy Huber MD;  Location: [facility] OR;  Service: Vascular;  Laterality: N/A;    REMOVAL OF ARTERIOVENOUS GRAFT Left 5/27/2021    Procedure: REMOVAL, GRAFT, LEFT LOWER EXTREMITY, WOUND EXPLORATION;  Surgeon: Teddy Huber MD;  Location: [facility] 2ND FLR;  Service: Vascular;  Laterality: Left;    REMOVAL OF NAIL OF DIGIT Left 3/9/2021    Procedure: REMOVAL, NAIL, DIGIT;  Surgeon: Rosio Mayes DPM;  Location: [facility] OR;  Service: Podiatry;  Laterality: Left;    RIGHT HEART CATHETERIZATION Right 5/10/2023    Procedure: INSERTION, CATHETER, RIGHT HEART;  Surgeon: Romulo Queen MD;  Location: [facility] CATH LAB;  Service: Cardiology;  Laterality: Right;     THROMBECTOMY Left 3/4/2021    Procedure: THROMBECTOMY, LEFT LOWER EXTREMITY BYPASS GRAFT, ANGIOGRAM, POSSIBLE INTERVENTION, POSSIBLE LEFT LOWER EXTREMITY BYPASS;  Surgeon: Teddy Huber MD;  Location: [facility] OR;  Service: Vascular;  Laterality: Left;    THROMBECTOMY Left 4/29/2021    Procedure: GRAFT THROMBECTOMY, LEFT LOWER EXTREMITY;  Surgeon: Teddy Huber MD;  Location: [facility] OR [facility] FLR;  Service: Vascular;  Laterality: Left;  14.5 min  1179.85 mGy  341.01 Gycm2  240 ml dye    THROMBECTOMY  10/22/2021    Procedure: THROMBECTOMY;  Surgeon: Saad Arenas MD;  Location: [facility] CATH LAB/EP;  Service: Cardiology;;       Review of patient's allergies indicates:   Allergen Reactions    Ciprofloxacin Itching    Contrast media      Kidney injury    Iodine      Kidney injury    Pcn [penicillins]      Rash; tolerated ceftriaxone on 1/13/20       No current facility-administered medications on file prior to encounter.     Current Outpatient Medications on File Prior to Encounter   Medication Sig    allopurinoL (ZYLOPRIM) 100 MG tablet Take 0.5 tablets (50 mg total) by mouth every other day.    apixaban (ELIQUIS) 5 mg Tab Take 1 tablet (5 mg total) by mouth 2 (two) times daily.    atorvastatin (LIPITOR) 80 MG tablet Take 1 tablet (80 mg total) by mouth once daily.    blood sugar diagnostic Strp To check BG 3 times daily, to use with insurance preferred meter    blood-glucose meter Misc To check BG 3 times daily, to use with insurance preferred meter    calcitRIOL (ROCALTROL) 0.5 MCG Cap Take 1 capsule (0.5 mcg total) by mouth once daily.    calcium acetate,phosphat bind, (PHOSLO) 667 mg capsule Take 1 capsule (667 mg total) by mouth 3 (three) times daily with meals.    carvediloL (COREG) 6.25 MG tablet Take 1 tablet (6.25 mg total) by mouth 2 (two) times daily.    clopidogreL (PLAVIX) 75 mg tablet Take 1 tablet (75 mg total) by mouth once daily.    famotidine (PEPCID) 20 MG tablet Take 1 tablet (20 mg total) by  "mouth 2 (two) times daily.    furosemide (LASIX) 40 MG tablet Take 1 tablet (40 mg total) by mouth 2 (two) times a day.    hydrALAZINE (APRESOLINE) 50 MG tablet Take 1 tablet (50 mg total) by mouth 3 (three) times daily.    hydrOXYzine HCL (ATARAX) 25 MG tablet Take 1 tablet (25 mg total) by mouth 3 (three) times daily.    insulin aspart, niacinamide, (FIASP FLEXTOUCH U-100 INSULIN) 100 unit/mL (3 mL) InPn Inject 1-5 Units into the skin before meals as needed (Per sliding scale). Blood sugar 150 to 200 add 1 units.  Blood sugar 201 to 250 add 2 units.  Blood sugar 251 to 300 add 3 units  Blood sugar 301 to 350 add 4 units.  Blood sugar greater than 350 add 5 units.    insulin detemir U-100, Levemir, 100 unit/mL (3 mL) SubQ InPn pen Inject 8 Units into the skin every evening.    isosorbide dinitrate (ISORDIL) 20 MG tablet Take 2 tablets (40 mg total) by mouth 3 (three) times daily.    lancets Misc To check BG 3 times daily, to use with insurance preferred meter    pen needle, diabetic 32 gauge x 5/32" Ndle 1 Units by Misc.(Non-Drug; Combo Route) route before meals as needed (SSI).    pregabalin (LYRICA) 100 MG capsule Take 1 capsule (100 mg total) by mouth 2 (two) times daily.    sertraline (ZOLOFT) 100 MG tablet Take 1 tablet (100 mg total) by mouth once daily.    sevelamer carbonate (RENVELA) 800 mg Tab Take 2 tablets (1,600 mg total) by mouth 3 (three) times daily.    sodium bicarbonate 650 MG tablet Take 1 tablet (650 mg total) by mouth 3 (three) times daily.    traZODone (DESYREL) 50 MG tablet Take 0.5 tablets (25 mg total) by mouth every evening.    [DISCONTINUED] lancing device Misc 1 Device by Misc.(Non-Drug; Combo Route) route 2 (two) times daily with meals.     Family History       Problem Relation (Age of Onset)    Diabetes Mother, Father, Paternal Grandmother    Heart disease Maternal Grandmother    No Known Problems Maternal Grandfather, Paternal Grandfather          Tobacco Use    Smoking status: Former "    Smokeless tobacco: Never   Substance and Sexual Activity    Alcohol use: No    Drug use: Yes     Types: Marijuana     Comment: occassional    Sexual activity: Yes     Partners: Male     Review of Systems   Unable to perform ROS: Mental status change     Objective:     Vital Signs (Most Recent):  Temp: 98.3 °F (36.8 °C) (02/04/24 2330)  Pulse: 77 (02/04/24 2330)  Resp: 16 (02/04/24 2330)  BP: 138/76 (02/04/24 2330)  SpO2: 100 % (02/04/24 2330) Vital Signs (24h Range):  Temp:  [97.4 °F (36.3 °C)-98.3 °F (36.8 °C)] 98.3 °F (36.8 °C)  Pulse:  [68-77] 77  Resp:  [12-25] 16  SpO2:  [94 %-100 %] 100 %  BP: (138-174)/() 138/76     Weight: 78 kg (171 lb 15.3 oz)  Body mass index is 52.47 kg/m².     Physical Exam  Vitals and nursing note reviewed.   Constitutional:       General: She is not in acute distress.     Appearance: She is ill-appearing. She is not toxic-appearing.      Comments: Asleep and unresponsive.  Does not withdraw to pain   HENT:      Head: Normocephalic and atraumatic.      Mouth/Throat:      Mouth: Mucous membranes are dry.      Pharynx: Oropharynx is clear.   Eyes:      General: No scleral icterus.     Pupils: Pupils are equal, round, and reactive to light.   Cardiovascular:      Rate and Rhythm: Normal rate. Rhythm irregular.      Pulses: Normal pulses.      Heart sounds: No murmur heard.  Pulmonary:      Effort: Pulmonary effort is normal.      Breath sounds: Normal breath sounds.   Abdominal:      General: Bowel sounds are normal. There is no distension.      Palpations: Abdomen is soft.      Tenderness: There is no abdominal tenderness.   Musculoskeletal:      Right lower leg: No edema.      Left lower leg: No edema.      Comments: Bilateral AKA   Skin:     General: Skin is warm and dry.   Neurological:      General: No focal deficit present.      Mental Status: She is disoriented.      Comments: Reflexes were intact              CRANIAL NERVES     CN III, IV, VI   Pupils are equal, round,  and reactive to light.       Significant Labs: All pertinent labs within the past 24 hours have been reviewed.    Significant Imaging: I have reviewed all pertinent imaging results/findings within the past 24 hours.  Assessment/Plan:     * Encephalopathy  Patient is a 58-year-old female who is presenting with a 24 hour history of altered mental status and unresponsiveness.  Patient had her HD yesterday and following that has been increasingly altered.  She has had a prior episode that has been associated with gabapentin buildup as she was switched to pregabalin..  The  at bedside provide supplemental history and states that the patient's Seroquel dose was also doubled in the last month.  Additionally the patient takes hydroxyzine, trazodone, famotidine, and quetiapine which all have antihistamine allergic properties and could be contributing to AMS.  She has no signs or symptoms of any infection.  An ammonia level was normal.  Her vitals are overall stable.  Blood pressure is elevated but not to the point where hypertensive encephalopathy was a concern.  She does have some mild elevations in her BNP with interstitial infiltrates but was dialyzed the day before so felt to be less likely.  There is no profound acidosis or uremia and the patients  denies any substance use.    In the setting of pregabalin use and multiple sedating medications, I favor that this is encephalopathy secondary to medication.  I will discontinue many of her antihistamine medications as well as her pregabalin and continue to observe.    Hydroxyzine + quetiapine + pregabalin + trazodone - sedation and CNS depression interactions  Hydroxyzine + quetiapine + trazodone + sertraline - increased risk of serotonin syndrome or cardiac arrhythmias    Plan:  - observe       - MRI w/o to rule out embolic stroke  - Swallow study prior to removing NPO status  - EEG    Severe obesity (BMI 35.0-39.9) with comorbidity  Body mass index is 52.47  kg/m². Morbid obesity complicates all aspects of disease management from diagnostic modalities to treatment. Weight loss encouraged and health benefits explained to patient.    BMI was adjusted for AKA         ESRD (end stage renal disease)  Creatine stable for now. BMP reviewed- noted Estimated Creatinine Clearance: 9.2 mL/min (A) (based on SCr of 5.4 mg/dL (H)). according to latest data. Based on current GFR, CKD stage is end stage.  Monitor UOP and serial BMP and adjust therapy as needed. Renally dose meds. Avoid nephrotoxic medications and procedures.    She gets HD on MWF.    Debility  Patient with Chronic debility due to  bilateral above knee amputation . Latest AMPAC and GEMS scores have not been reviewed.  Impact score 6.  Evaluation for etiology is underway. Plan includes fall precautions in place.    S/P AKA (above knee amputation) bilateral  Noted. Appears to have decent mobility and per the , is able to get to her wheelchair, wash, and use the restroom independently. The last two days she has not been able to which was one of the precipitating factors of why she presented.      Chronic combined systolic and diastolic heart failure      TTE  The left ventricle is mildly enlarged with severely decreased systolic function. The estimated ejection fraction is 15%.  There is severe left ventricular global hypokinesis.  Normal right ventricular size with low normal right ventricular systolic function.  Grade I left ventricular diastolic dysfunction.  Biatrial enlargement.  Mild-to-moderate mitral regurgitation.  Severe tricuspid regurgitation.  The estimated PA systolic pressure is 43 mmHg.  Elevated central venous pressure (15 mmHg).      Plan:  - HD, continue home furosemide  - Avoid salt intake > 2 g/day  - Strict I's/O's  - magnesium > 2, potassium > 4  - Goal directed medical therapy as indicated including:    - carvedilol   - hydralazine/isosorbide dinitrate  - Fluid restriction to 1.5L    S/P  femoral-popliteal bypass surgery  Continue clopidogrel and apixaban      Paroxysmal atrial fibrillation  Patient with Permanent atrial fibrillation which is controlled currently with Beta Blocker. Patient is currently in atrial fibrillation.[id number] Score: 3. HASBLED Score: 3. Anticoagulation indicated. Anticoagulation done with heparin due to strict NPO .      Plan:  - Rate control with target HR <110  - carvedilol  - Patient's [id number] score is 3, annual risk of stroke is 6.7  - Score > 1 requires anticoagulation preferably with Unfractionated Heparin  - HAS-BLED Score:  - Hypertension (+1)  Yes  - Impaired Renal Function (Dialysis, transplant, Cr >2.26 mg/dL) (+1)  Yes  - Impaired Liver Function (Cirrhosis or bilirubin >2x normal with AST/ALT/AP >3x normal) (+1)  No  - History of Stroke (+1)  No  - History of Bleeding (+1)  No  - Labile INRs (Unstable/high INRs, time in therapeutic range <60%) (+1)  No  - >65 years (+1)  No  - Drugs (Antiplatelet agents, NSAIDs) (+1)  Yes  - Alcohol Consumption (+1)  No    - Total Score:  3  - Risk of bleedin.8%  - Magnesium > 2, Potassium > 4          Anemia due to chronic kidney disease, on chronic dialysis  Patient's anemia is currently controlled. Has not received any PRBCs to date. Etiology likely d/t chronic disease due to Chronic Kidney Disease/ESRD  Current CBC reviewed-   Lab Results   Component Value Date    HGB 12.0 2024    HCT 38.2 2024     Monitor serial CBC and transfuse if patient becomes hemodynamically unstable, symptomatic or H/H drops below 7/21.      Plan:  - CBC with differential, reticulocyte count, iron studies, CMP  POC Hematocrit   Date Value Ref Range Status   2024 36 36 - 54 %PCV Final     Hematocrit   Date Value Ref Range Status   2024 38.2 37.0 - 48.5 % Final     MCH   Date Value Ref Range Status   2024 30.3 27.0 - 31.0 pg Final     MCV   Date Value Ref Range Status   2024 97 82 - 98 fL Final     Retic  "  Date Value Ref Range Status   02/04/2022 2.0 0.5 - 2.5 % Final   No results found for: "BILIRUTOT"    S/P CABG x 1  [facility] in 2020 prior to CABG:    Successful IVUS guided PCI of mid circumflex 80-85% stenosis with drug-eluting stent x1.  A 3.5 into 26 mm drug-eluting stent was implanted with excellent angiographic results and with IVUS.  SUSIE 3 flow post PCI     Coronary angiogram:     Left main:  No significant stenosis     Lad:  Severe proximal LAD stenosis.  Distal LAD gets supply from LIMA to LAD.  Lima to LAD is widely patent.  No significant stenosis noted after touchdown of LIMA to LAD.     Circumflex:  Mid circumflex 80-85% stenosis     RCA:   in the mid RCA.  Distal RCA gets supply from well-developed collaterals from the left        Access:  6 Bengali right common femoral artery access.  Sheath sutured in.  Will be pulled when ACT less than 160     Assessment and plan      aspirin 81 mg daily indefinitely     Plavix 75 mg daily uninterrupted for at least 1 year.     Talked to vascular surgery prior to procedure.  Okay to give dual antiplatelet therapy as Dr. Rivera will operate on aspirin and Plavix.     Okay to proceed with vascular surgery at low-to-moderate risk of coronary ischemia.  Continue pre, rico and postoperative beta-blockers to keep heart rate around 60 beats per minute.      Type 2 diabetes mellitus with hyperglycemia, with long-term current use of insulin  Patient's FSGs are uncontrolled due to hyperglycemia on current medication regimen.  Last A1c reviewed-   Lab Results   Component Value Date    HGBA1C 9.7 (H) 02/04/2024     Most recent fingerstick glucose reviewed-   Recent Labs   Lab 02/04/24  1558 02/04/24  1711 02/04/24  2328   POCTGLUCOSE 235* 252* 199*     Current correctional scale  Low  Maintain anti-hyperglycemic dose as follows-   Antihyperglycemics (From admission, onward)      Start     Stop Route Frequency Ordered    02/04/24 2249  insulin aspart U-100 pen 0-5 Units      "    -- SubQ Every 6 hours PRN 02/04/24 2149 02/04/24 2200  insulin detemir U-100 (Levemir) pen 6 Units         -- SubQ Nightly 02/04/24 2056          Hold Oral hypoglycemics while patient is in the hospital.        Essential hypertension  Chronic, uncontrolled. Latest blood pressure and vitals reviewed-     Temp:  [97.4 °F (36.3 °C)-98.3 °F (36.8 °C)]   Pulse:  [68-77]   Resp:  [12-25]   BP: (138-174)/()   SpO2:  [94 %-100 %] .   Home meds for hypertension were reviewed and noted below.   Hypertension Medications               carvediloL (COREG) 6.25 MG tablet Take 1 tablet (6.25 mg total) by mouth 2 (two) times daily.    furosemide (LASIX) 40 MG tablet Take 1 tablet (40 mg total) by mouth 2 (two) times a day.    hydrALAZINE (APRESOLINE) 50 MG tablet Take 1 tablet (50 mg total) by mouth 3 (three) times daily.    isosorbide dinitrate (ISORDIL) 20 MG tablet Take 2 tablets (40 mg total) by mouth 3 (three) times daily.            While in the hospital, will manage blood pressure as follows; Adjust home antihypertensive regimen as follows- stop all oral antihypertensives    Will utilize p.r.n. blood pressure medication only if patient's blood pressure greater than 180/110 and she develops symptoms such as worsening chest pain or shortness of breath.      VTE Risk Mitigation (From admission, onward)           Ordered     heparin 25,000 units in dextrose 5% (100 units/ml) IV bolus from bag - ADDITIONAL PRN BOLUS - 60 units/kg  As needed (PRN)        Question:  Heparin Infusion Adjustment (DO NOT MODIFY ANSWER)  Answer:  \\[email or web address]\epic\Images\Pharmacy\HeparinInfusions\heparin LOW INTENSITY nomogram for [facility] [id number].pdf    02/04/24 2148     heparin 25,000 units in dextrose 5% (100 units/ml) IV bolus from bag - ADDITIONAL PRN BOLUS - 30 units/kg  As needed (PRN)        Question:  Heparin Infusion Adjustment (DO NOT MODIFY ANSWER)  Answer:  \\[email or web address]\epic\Images\Pharmacy\HeparinInfusions\heparin LOW INTENSITY  nomogram for [facility] [id number].pdf    02/04/24 2148     heparin 25,000 units in dextrose 5% 250 mL (100 units/mL) infusion LOW INTENSITY nomogram - [facility]  Continuous        Question:  Begin at (units/kg/hr)  Answer:  12    02/04/24 2148     IP VTE HIGH RISK PATIENT  Once         02/04/24 2056     Place sequential compression device  Until discontinued         02/04/24 2056     Reason for No Pharmacological VTE Prophylaxis  Once        Question:  Reasons:  Answer:  Already adequately anticoagulated on oral Anticoagulants    02/04/24 2056                       On 02/05/2024, patient should be placed in hospital observation services under my care.        Pharmacist Renal Dose Adjustment Note    Suyapa Connelly is a 58 y.o. female being treated with famotidine 20 mg every 12 hours    Patient Data:    Vital Signs (Most Recent):  Temp: 97.4 °F (36.3 °C) (02/04/24 1557)  Pulse: 73 (02/04/24 2033)  Resp: 14 (02/04/24 2033)  BP: (!) 174/81 (02/04/24 2033)  SpO2: 95 % (02/04/24 2033) Vital Signs (72h Range):  Temp:  [97.4 °F (36.3 °C)]   Pulse:  [68-73]   Resp:  [12-25]   BP: (152-174)/()   SpO2:  [94 %-98 %]      Recent Labs   Lab 02/04/24  1841   CREATININE 5.4*     Serum creatinine: 5.4 mg/dL (H) 02/04/24 1841  Estimated creatinine clearance: 9.2 mL/min (A)    Adjusted to  Famotidine 20 mg every 24 hours, per pharmacy protocol     Pharmacist's Name: Claudette Saha  Pharmacist's Extension: [phone]      Constantine Westbrook MD  Department of Hospital Medicine  [facility] - Emergency Dept

## 2024-02-05 NOTE — ED NOTES
Tele box applied to pt. War room states able to see pt on monitor, rhythm NSR with HR 88.Transport requested at this time.

## 2024-02-05 NOTE — PROGRESS NOTES
.EEG Hook up  AM Check Electrodes had to be fixed.Yes    Skin Integrity: Normal   Routine EEG performed; no signs of skin breakdown seen during hook-up or disconnect   Unique Bear   02/05/2024 9:53 AM

## 2024-02-05 NOTE — HPI
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  She is accompanied by her  who states that the patient woke up this am and was completely unresponsive to verbal cues this morning.  Day prior, the patient had been having visual hallucinations Claritin her  where she was trying to talk to people in the room who were not there.  Seemed as though that she was not having full conversations at the time.  The patient was able to take evening medications and then went to bed.  This morning the patient is completely unresponsive to stimuli and has been sleeping the entire day.  Occasionally she will open her eyes to stimuli and apparently she was able to squeeze her hand when asked by the emergency physician however I did not experience this.  There was no convulsive episode prior to this.  A CT of her head was negative.     states that there was 1 prior episode of this happening that was related to gabapentin overdose however she has not been taking any gabapentin in his instead using only pregabalin. She also had her quetiapine doubled approximately 1 month ago and was prescribed a new medication for sleep but is unclear as to which one.  In the emergency room vitals were relatively stable except for some mild hypertension.  A metabolic panel showed mild hyponatremia of 134, a BUN and creatinine of 37 and 5.4, alkaline phosphatase of 339, magnesium 2.2, ammonia was 44, troponin was 0.047 and a BNP was elevated at 1024 although it is unclear what her baseline is given her ESRD.  A CBC was collected which showed some mild but at goal anemia of 11.7.  A CT head showed chronic microvascular changes but no acute changes and a chest x-ray was largely unremarkable outside of some  interstitial infiltrates suggestive of early pulmonary edema which correlates with her BNP elevation..  Blood cultures were drawn but no empiric antibiotics were given.  A UA is pending but it is unclear if the patient makes urine at all.

## 2024-02-05 NOTE — ED NOTES
MD notified of PTT results and awaiting clarification of heparin nomogram at this time. Charge nurse notified and aware.

## 2024-02-05 NOTE — SUBJECTIVE & OBJECTIVE
Past Medical History:   Diagnosis Date    Anxiety     Chronic pain syndrome     CKD (chronic kidney disease), stage III     Depression     Diabetes mellitus     type 2    Diabetes mellitus, type 2     GERD (gastroesophageal reflux disease)     Hyperemesis 3/23/2021    Hypokalemia 3/23/2021    Infection of below knee amputation stump 3/12/2022    Osteomyelitis     Osteomyelitis of left foot 4/30/2021    Ulcer of left foot     Vaginal delivery     x1       Past Surgical History:   Procedure Laterality Date    ABOVE-KNEE AMPUTATION Left 5/18/2021    Procedure: AMPUTATION, ABOVE KNEE;  Surgeon: Teddy Huber MD;  Location: [facility] OR [address] [place] [address];  Service: Vascular;  Laterality: Left;    ABOVE-KNEE AMPUTATION Right 3/18/2022    Procedure: AMPUTATION, ABOVE KNEE;  Surgeon: DAYNE Florez II, MD;  Location: [facility] OR [address] [place] [address];  Service: Vascular;  Laterality: Right;    Angiogram - Right Extremity Right 7/9/15    angiogram-left leg  10/6/15    ANGIOGRAPHY OF LOWER EXTREMITY Left 4/29/2021    Procedure: ANGIOGRAM, LOWER EXTREMITY;  Surgeon: Teddy Huber MD;  Location: [facility] OR [address] [place] [address];  Service: Vascular;  Laterality: Left;    BELOW KNEE AMPUTATION OF LOWER EXTREMITY Right 12/28/2021    Procedure: AMPUTATION, BELOW KNEE;  Surgeon: Kaitlyn Rojas MD;  Location: [facility] OR;  Service: General;  Laterality: Right;    CATHETERIZATION OF BOTH LEFT AND RIGHT HEART N/A 12/18/2019    Procedure: CATHETERIZATION, HEART, BOTH LEFT AND RIGHT;  Surgeon: Que Fernando III, MD;  Location: [facility] CATH LAB;  Service: Cardiology;  Laterality: N/A;    CORONARY ANGIOGRAPHY N/A 12/18/2019    Procedure: ANGIOGRAM, CORONARY ARTERY;  Surgeon: Que Fernando III, MD;  Location: [facility] CATH LAB;  Service: Cardiology;  Laterality: N/A;    CORONARY ANGIOGRAPHY INCLUDING BYPASS GRAFTS WITH CATHETERIZATION OF LEFT HEART N/A 7/28/2020    Procedure: ANGIOGRAM, CORONARY, INCLUDING BYPASS GRAFT, WITH LEFT HEART CATHETERIZATION, 9 am;  Surgeon:  Rachel Easley MD;  Location: [facility] CATH LAB;  Service: Cardiology;  Laterality: N/A;    CORONARY ARTERY BYPASS GRAFT (CABG) N/A 1/14/2020    Procedure: CORONARY ARTERY BYPASS GRAFT (CABG) x 1     Off Pump;  Surgeon: Huang Altamriano MD;  Location: [facility] OR [address] [place] [address];  Service: Cardiovascular;  Laterality: N/A;    CREATION OF FEMORAL-TIBIAL ARTERY BYPASS Left 4/29/2021    Procedure: CREATION, BYPASS, ARTERIAL, FEMORAL TO ANTERIOR TIBIAL;  Surgeon: Teddy Huber MD;  [facility];  Service: Vascular;  Laterality: Left;    CREATION OF FEMOROPOPLITEAL ARTERIAL BYPASS USING GRAFT Left 8/18/2020    Procedure: CREATION, BYPASS, ARTERIAL, FEMORAL TO POPLITEAL, USING GRAFT, LEFT LOWER EXTREMITY;  Surgeon: Teddy Huber MD;  Location: [facility];  Service: Vascular;  Laterality: Left;  REQUEST 7:15 A.M. START----COVID NEGATIVE ON 8/17 1ST CASE STARTKENYA DUEÑAS ON 8/7/2020 @ 942AM-  RN PREOP 8/12/2020   T/S-----CLEARED BY CARDS-------PENDING INSURANCE    DEBRIDEMENT OF FOOT Left 9/8/2020    Procedure: DEBRIDEMENT, FOOT;  Surgeon: Rosio Mayes DPM;  Location: [facility];  Service: Podiatry;  Laterality: Left;  request neoxx .   RN Pre Op 9-4-2020, Covid negative on 9/5/20. C A    DEBRIDEMENT OF FOOT  3/4/2021    Procedure: DEBRIDEMENT, FOOT;  Surgeon: Teddy Huber MD;  Location: [facility] OR;  Service: Vascular;;    DEBRIDEMENT OF FOOT Left 3/9/2021    Procedure: DEBRIDEMENT, FOOT, bone biopsy;  Surgeon: Rosio Mayes DPM;  Location: [facility] OR;  Service: Podiatry;  Laterality: Left;  Request neoxx---COVID IN AM  REQUESTING NOON START  RN Phone Pre op.On Blood thinners Plavix and Eliquis.  Covid am of surgery. C A    DEBRIDEMENT OF FOOT Left 5/4/2021    Procedure: DEBRIDEMENT, FOOT;  Surgeon: Farooq Morley DPM;  [address];  Service: Podiatry;  Laterality: Left;    INSERTION OF TUNNELED CENTRAL VENOUS HEMODIALYSIS CATHETER N/A 1/27/2020    Procedure: Insertion, Catheter,  Central Venous, Hemodialysis;  Surgeon: ESTEBAN Gomez III, MD;  Location: [facility] CATH LAB;  Service: Peripheral Vascular;  Laterality: N/A;    INSERTION OF TUNNELED CENTRAL VENOUS HEMODIALYSIS CATHETER  5/10/2023    Procedure: Insertion, Catheter, Central Venous, Hemodialysis;  Surgeon: Romulo Queen MD;  Location: [facility] CATH LAB;  Service: Cardiology;;    PERCUTANEOUS TRANSLUMINAL ANGIOPLASTY N/A 3/4/2021    Procedure: PTA (ANGIOPLASTY, PERCUTANEOUS, TRANSLUMINAL);  Surgeon: Teddy Huber MD;  Location: [facility] OR;  Service: Vascular;  Laterality: N/A;    REMOVAL OF ARTERIOVENOUS GRAFT Left 5/27/2021    Procedure: REMOVAL, GRAFT, LEFT LOWER EXTREMITY, WOUND EXPLORATION;  Surgeon: Teddy Huber MD;  Location: [facility] 2ND FLR;  Service: Vascular;  Laterality: Left;    REMOVAL OF NAIL OF DIGIT Left 3/9/2021    Procedure: REMOVAL, NAIL, DIGIT;  Surgeon: Rosio Mayes DPM;  Location: [facility] OR;  Service: Podiatry;  Laterality: Left;    RIGHT HEART CATHETERIZATION Right 5/10/2023    Procedure: INSERTION, CATHETER, RIGHT HEART;  Surgeon: Romulo Queen MD;  Location: [facility] CATH LAB;  Service: Cardiology;  Laterality: Right;    THROMBECTOMY Left 3/4/2021    Procedure: THROMBECTOMY, LEFT LOWER EXTREMITY BYPASS GRAFT, ANGIOGRAM, POSSIBLE INTERVENTION, POSSIBLE LEFT LOWER EXTREMITY BYPASS;  Surgeon: Teddy Huber MD;  Location: [facility] OR;  Service: Vascular;  Laterality: Left;    THROMBECTOMY Left 4/29/2021    Procedure: GRAFT THROMBECTOMY, LEFT LOWER EXTREMITY;  Surgeon: Teddy Huber MD;  Location: [facility] OR 2ND FLR;  Service: Vascular;  Laterality: Left;  14.5 min  1179.85 mGy  341.01 Gycm2  240 ml dye    THROMBECTOMY  10/22/2021    Procedure: THROMBECTOMY;  Surgeon: Saad Arenas MD;  Location: [facility] CATH LAB/EP;  Service: Cardiology;;       Review of patient's allergies indicates:   Allergen Reactions    Ciprofloxacin Itching    Contrast media      Kidney injury    Iodine      Kidney injury     Pcn [penicillins]      Rash; tolerated ceftriaxone on 1/13/20       No current facility-administered medications on file prior to encounter.     Current Outpatient Medications on File Prior to Encounter   Medication Sig    allopurinoL (ZYLOPRIM) 100 MG tablet Take 0.5 tablets (50 mg total) by mouth every other day.    apixaban (ELIQUIS) 5 mg Tab Take 1 tablet (5 mg total) by mouth 2 (two) times daily.    atorvastatin (LIPITOR) 80 MG tablet Take 1 tablet (80 mg total) by mouth once daily.    blood sugar diagnostic Strp To check BG 3 times daily, to use with insurance preferred meter    blood-glucose meter Misc To check BG 3 times daily, to use with insurance preferred meter    calcitRIOL (ROCALTROL) 0.5 MCG Cap Take 1 capsule (0.5 mcg total) by mouth once daily.    calcium acetate,phosphat bind, (PHOSLO) 667 mg capsule Take 1 capsule (667 mg total) by mouth 3 (three) times daily with meals.    carvediloL (COREG) 6.25 MG tablet Take 1 tablet (6.25 mg total) by mouth 2 (two) times daily.    clopidogreL (PLAVIX) 75 mg tablet Take 1 tablet (75 mg total) by mouth once daily.    famotidine (PEPCID) 20 MG tablet Take 1 tablet (20 mg total) by mouth 2 (two) times daily.    furosemide (LASIX) 40 MG tablet Take 1 tablet (40 mg total) by mouth 2 (two) times a day.    hydrALAZINE (APRESOLINE) 50 MG tablet Take 1 tablet (50 mg total) by mouth 3 (three) times daily.    hydrOXYzine HCL (ATARAX) 25 MG tablet Take 1 tablet (25 mg total) by mouth 3 (three) times daily.    insulin aspart, niacinamide, (FIASP FLEXTOUCH U-100 INSULIN) 100 unit/mL (3 mL) InPn Inject 1-5 Units into the skin before meals as needed (Per sliding scale). Blood sugar 150 to 200 add 1 units.  Blood sugar 201 to 250 add 2 units.  Blood sugar 251 to 300 add 3 units  Blood sugar 301 to 350 add 4 units.  Blood sugar greater than 350 add 5 units.    insulin detemir U-100, Levemir, 100 unit/mL (3 mL) SubQ InPn pen Inject 8 Units into the skin every evening.     "isosorbide dinitrate (ISORDIL) 20 MG tablet Take 2 tablets (40 mg total) by mouth 3 (three) times daily.    lancets Misc To check BG 3 times daily, to use with insurance preferred meter    pen needle, diabetic 32 gauge x 5/32" Ndle 1 Units by Misc.(Non-Drug; Combo Route) route before meals as needed (SSI).    pregabalin (LYRICA) 100 MG capsule Take 1 capsule (100 mg total) by mouth 2 (two) times daily.    sertraline (ZOLOFT) 100 MG tablet Take 1 tablet (100 mg total) by mouth once daily.    sevelamer carbonate (RENVELA) 800 mg Tab Take 2 tablets (1,600 mg total) by mouth 3 (three) times daily.    sodium bicarbonate 650 MG tablet Take 1 tablet (650 mg total) by mouth 3 (three) times daily.    traZODone (DESYREL) 50 MG tablet Take 0.5 tablets (25 mg total) by mouth every evening.    [DISCONTINUED] lancing device Misc 1 Device by Misc.(Non-Drug; Combo Route) route 2 (two) times daily with meals.     Family History       Problem Relation (Age of Onset)    Diabetes Mother, Father, Paternal Grandmother    Heart disease Maternal Grandmother    No Known Problems Maternal Grandfather, Paternal Grandfather          Tobacco Use    Smoking status: Former    Smokeless tobacco: Never   Substance and Sexual Activity    Alcohol use: No    Drug use: Yes     Types: Marijuana     Comment: occassional    Sexual activity: Yes     Partners: Male     Review of Systems   Unable to perform ROS: Mental status change     Objective:     Vital Signs (Most Recent):  Temp: 98.3 °F (36.8 °C) (02/04/24 2330)  Pulse: 77 (02/04/24 2330)  Resp: 16 (02/04/24 2330)  BP: 138/76 (02/04/24 2330)  SpO2: 100 % (02/04/24 2330) Vital Signs (24h Range):  Temp:  [97.4 °F (36.3 °C)-98.3 °F (36.8 °C)] 98.3 °F (36.8 °C)  Pulse:  [68-77] 77  Resp:  [12-25] 16  SpO2:  [94 %-100 %] 100 %  BP: (138-174)/() 138/76     Weight: 78 kg (171 lb 15.3 oz)  Body mass index is 52.47 kg/m².     Physical Exam  Vitals and nursing note reviewed.   Constitutional:       " General: She is not in acute distress.     Appearance: She is ill-appearing. She is not toxic-appearing.      Comments: Asleep and unresponsive.  Does not withdraw to pain   HENT:      Head: Normocephalic and atraumatic.      Mouth/Throat:      Mouth: Mucous membranes are dry.      Pharynx: Oropharynx is clear.   Eyes:      General: No scleral icterus.     Pupils: Pupils are equal, round, and reactive to light.   Cardiovascular:      Rate and Rhythm: Normal rate. Rhythm irregular.      Pulses: Normal pulses.      Heart sounds: No murmur heard.  Pulmonary:      Effort: Pulmonary effort is normal.      Breath sounds: Normal breath sounds.   Abdominal:      General: Bowel sounds are normal. There is no distension.      Palpations: Abdomen is soft.      Tenderness: There is no abdominal tenderness.   Musculoskeletal:      Right lower leg: No edema.      Left lower leg: No edema.      Comments: Bilateral AKA   Skin:     General: Skin is warm and dry.   Neurological:      General: No focal deficit present.      Mental Status: She is disoriented.      Comments: Reflexes were intact              CRANIAL NERVES     CN III, IV, VI   Pupils are equal, round, and reactive to light.       Significant Labs: All pertinent labs within the past 24 hours have been reviewed.    Significant Imaging: I have reviewed all pertinent imaging results/findings within the past 24 hours.

## 2024-02-05 NOTE — ASSESSMENT & PLAN NOTE
Nephrology History  iHD Schedule: F   Unit/MD: TARYN  Duration: 3 hours   UF: 3-4  EDW: 76 kg   Access: LIJ TDC   Residual Renal Function: oliguric   Last HD prior to presentation: 2/3/24    Assessment:   - No acute indication for RRT plan for HD tomorrow 2/6/24    -Continue home phos binders  - Dialysate adjusted to current labs   - Will obtain OP dialysis records  - Continue to monitor intake and output, daily weights   - Avoid nephrotoxic medication and renal dose medications to GFR

## 2024-02-05 NOTE — PROCEDURES
EEG    Date/Time: 2/4/2024 3:59 PM    Performed by: Edilberto Garcia MD  Authorized by: Constantine Westbrook MD      ELECTROENCEPHALOGRAM REPORT    DATE OF SERVICE: 2/5/24  EEG NUMBER: [id number]   REQUESTED BY: Dariana  LOCATION OF SERVICE: [facility]     METHODOLOGY   Electroencephalographic (EEG) recording is with electrodes placed according to the International 10-20 placement system.  Thirty two (32) channels of digital signal (sampling rate of 512/sec) including T1 and T2 was simultaneously recorded from the scalp and may include  EKG, EMG, and/or eye monitors.  Recording band pass was 0.1 to 512 hz.  Digital video recording of the patient is simultaneously recorded with the EEG.  The patient is instructed report clinical symptoms which may occur during the recording session.  EEG and video recording is stored and archived in digital format. Activation procedures which include photic stimulation, hyperventilation and instructing patients to perform simple task are done in selected patients.    The EEG is displayed on a monitor screen and can be reviewed using different montages.  Computer assisted analysis is employed to detect spike and electrographic seizure activity.   The entire record is submitted for computer analysis.  The entire recording is visually reviewed and the times identified by computer analysis as being spikes or seizures are reviewed again.  Compresses spectral analysis (CSA) is also performed on the activity recorded from each individual channel.  This is displayed as a power display of frequencies from 0 to 30 Hz over time.   The CSA is reviewed looking for asymmetries in power between homologous areas of the scalp and then compared with the original EEG recording.     [employer] ([employer]) software was also utilized in the review of this study.  This software suite analyzes the EEG recording in multiple domains.  Coherence and rhythmicity is computed to identify EEG sections which may contain organized seizures.  Each  channel undergoes analysis to detect presence of spike and sharp waves which have special and morphological characteristic of epileptic activity.  The routine EEG recording is converted from spacial into frequency domain.  This is then displayed comparing homologous areas to identify areas of significant asymmetry.  Algorithm to identify non-cortically generated artifact is used to separate eye movement, EMG and other artifact from the EEG    EEG FINDINGS  The record shows a good  organization at rest, consisting of a 9 Hz posterior dominant rhythm with good  reactivity. There is mild bilateral beta activity. There is continuous diffuse theta range background slowing.     Drowsiness is characterized by attenuation of the background, vertex waves, and bilateral theta slowing. Stage II sleep is characterized by slowing, vertex waves, and symmetric sleep spindles.      Provocative maneuvers including hyperventilation and photic stimulation were not performed.      EKG recording shows a regular rhythm.     There is no push button or clinical event.    IMPRESSION:  Abnormal study due to mild diffuse background slowing consistent with diffuse cerebral dysfunction and encephalopathy which may be on the basis of toxic, metabolic, or primary neuronal disorder.     Edilberto Garcia MD

## 2024-02-05 NOTE — ED NOTES
Nurses Note -- 4 Eyes      2/4/2024   8:36 PM      Skin assessed during: Admit      [x] No Altered Skin Integrity Present    [x]Prevention Measures Documented      [] Yes- Altered Skin Integrity Present or Discovered   [] LDA Added if Not in Epic (Describe Wound)   [] New Altered Skin Integrity was Present on Admit and Documented in LDA   [] Wound Image Taken    Wound Care Consulted? No    Attending Nurse:  Glendy Lin RN/Staff Member:   Alfonso

## 2024-02-05 NOTE — ASSESSMENT & PLAN NOTE
[facility] in 2020 prior to CABG:    Successful IVUS guided PCI of mid circumflex 80-85% stenosis with drug-eluting stent x1.  A 3.5 into 26 mm drug-eluting stent was implanted with excellent angiographic results and with IVUS.  SUSIE 3 flow post PCI     Coronary angiogram:     Left main:  No significant stenosis     Lad:  Severe proximal LAD stenosis.  Distal LAD gets supply from LIMA to LAD.  Lima to LAD is widely patent.  No significant stenosis noted after touchdown of LIMA to LAD.     Circumflex:  Mid circumflex 80-85% stenosis     RCA:   in the mid RCA.  Distal RCA gets supply from well-developed collaterals from the left        Access:  6 [place] right common femoral artery access.  Sheath sutured in.  Will be pulled when ACT less than 160     Assessment and plan      aspirin 81 mg daily indefinitely     Plavix 75 mg daily uninterrupted for at least 1 year.     Talked to vascular surgery prior to procedure.  Okay to give dual antiplatelet therapy as Dr. Rivera will operate on aspirin and Plavix.     Okay to proceed with vascular surgery at low-to-moderate risk of coronary ischemia.  Continue pre, rico and postoperative beta-blockers to keep heart rate around 60 beats per minute.

## 2024-02-05 NOTE — SUBJECTIVE & OBJECTIVE
Past Medical History:   Diagnosis Date    Anxiety     Chronic pain syndrome     CKD (chronic kidney disease), stage III     Depression     Diabetes mellitus     type 2    Diabetes mellitus, type 2     GERD (gastroesophageal reflux disease)     Hyperemesis 3/23/2021    Hypokalemia 3/23/2021    Infection of below knee amputation stump 3/12/2022    Osteomyelitis     Osteomyelitis of left foot 4/30/2021    Ulcer of left foot     Vaginal delivery     x1       Past Surgical History:   Procedure Laterality Date    ABOVE-KNEE AMPUTATION Left 5/18/2021    Procedure: AMPUTATION, ABOVE KNEE;  Surgeon: Teddy Huber MD;  Location: [facility] OR [address] [place] [address];  Service: Vascular;  Laterality: Left;    ABOVE-KNEE AMPUTATION Right 3/18/2022    Procedure: AMPUTATION, ABOVE KNEE;  Surgeon: DAYNE Florez II, MD;  Location: [facility] OR [address] [place] [address];  Service: Vascular;  Laterality: Right;    Angiogram - Right Extremity Right 7/9/15    angiogram-left leg  10/6/15    ANGIOGRAPHY OF LOWER EXTREMITY Left 4/29/2021    Procedure: ANGIOGRAM, LOWER EXTREMITY;  Surgeon: Teddy Huber MD;  Location: [facility] OR [address] [place] [address];  Service: Vascular;  Laterality: Left;    BELOW KNEE AMPUTATION OF LOWER EXTREMITY Right 12/28/2021    Procedure: AMPUTATION, BELOW KNEE;  Surgeon: Kaitlyn Rojas MD;  Location: [facility] OR;  Service: General;  Laterality: Right;    CATHETERIZATION OF BOTH LEFT AND RIGHT HEART N/A 12/18/2019    Procedure: CATHETERIZATION, HEART, BOTH LEFT AND RIGHT;  Surgeon: Que Fernando III, MD;  [facility];  Service: Cardiology;  Laterality: N/A;    CORONARY ANGIOGRAPHY N/A 12/18/2019    Procedure: ANGIOGRAM, CORONARY ARTERY;  Surgeon: Que Fernando III, MD;  [facility];  Service: Cardiology;  Laterality: N/A;    CORONARY ANGIOGRAPHY INCLUDING BYPASS GRAFTS WITH CATHETERIZATION OF LEFT HEART N/A 7/28/2020    Procedure: ANGIOGRAM, CORONARY, INCLUDING BYPASS GRAFT, WITH LEFT HEART CATHETERIZATION, 9 am;  Surgeon:  Rachel Easley MD;  Location: [facility] CATH LAB;  Service: Cardiology;  Laterality: N/A;    CORONARY ARTERY BYPASS GRAFT (CABG) N/A 1/14/2020    Procedure: CORONARY ARTERY BYPASS GRAFT (CABG) x 1     Off Pump;  Surgeon: Huang Altamirano MD;  Location: [facility] OR [address] [place] [address];  Service: Cardiovascular;  Laterality: N/A;    CREATION OF FEMORAL-TIBIAL ARTERY BYPASS Left 4/29/2021    Procedure: CREATION, BYPASS, ARTERIAL, FEMORAL TO ANTERIOR TIBIAL;  Surgeon: Teddy Huber MD;  [facility];  Service: Vascular;  Laterality: Left;    CREATION OF FEMOROPOPLITEAL ARTERIAL BYPASS USING GRAFT Left 8/18/2020    Procedure: CREATION, BYPASS, ARTERIAL, FEMORAL TO POPLITEAL, USING GRAFT, LEFT LOWER EXTREMITY;  Surgeon: Teddy Huber MD;  Location: [facility];  Service: Vascular;  Laterality: Left;  REQUEST 7:15 A.M. START----COVID NEGATIVE ON 8/17 1ST CASE STARTKENYA DUEÑAS ON 8/7/2020 @ 942AM-  RN PREOP 8/12/2020   T/S-----CLEARED BY CARDS-------PENDING INSURANCE    DEBRIDEMENT OF FOOT Left 9/8/2020    Procedure: DEBRIDEMENT, FOOT;  Surgeon: Rosio Mayes DPM;  Location: [facility];  Service: Podiatry;  Laterality: Left;  request neoxx .   RN Pre Op 9-4-2020, Covid negative on 9/5/20. C A    DEBRIDEMENT OF FOOT  3/4/2021    Procedure: DEBRIDEMENT, FOOT;  Surgeon: Teddy Huber MD;  Location: [facility] OR;  Service: Vascular;;    DEBRIDEMENT OF FOOT Left 3/9/2021    Procedure: DEBRIDEMENT, FOOT, bone biopsy;  Surgeon: Rosio Mayes DPM;  Location: [facility] OR;  Service: Podiatry;  Laterality: Left;  Request neoxx---COVID IN AM  REQUESTING NOON START  RN Phone Pre op.On Blood thinners Plavix and Eliquis.  Covid am of surgery. C A    DEBRIDEMENT OF FOOT Left 5/4/2021    Procedure: DEBRIDEMENT, FOOT;  Surgeon: Farooq Morley DPM;  [address];  Service: Podiatry;  Laterality: Left;    INSERTION OF TUNNELED CENTRAL VENOUS HEMODIALYSIS CATHETER N/A 1/27/2020    Procedure: Insertion, Catheter,  Central Venous, Hemodialysis;  Surgeon: ESTEBAN Gomez III, MD;  Location: [facility] CATH LAB;  Service: Peripheral Vascular;  Laterality: N/A;    INSERTION OF TUNNELED CENTRAL VENOUS HEMODIALYSIS CATHETER  5/10/2023    Procedure: Insertion, Catheter, Central Venous, Hemodialysis;  Surgeon: Romulo Queen MD;  Location: [facility] CATH LAB;  Service: Cardiology;;    PERCUTANEOUS TRANSLUMINAL ANGIOPLASTY N/A 3/4/2021    Procedure: PTA (ANGIOPLASTY, PERCUTANEOUS, TRANSLUMINAL);  Surgeon: Teddy Huber MD;  Location: [facility] OR;  Service: Vascular;  Laterality: N/A;    REMOVAL OF ARTERIOVENOUS GRAFT Left 5/27/2021    Procedure: REMOVAL, GRAFT, LEFT LOWER EXTREMITY, WOUND EXPLORATION;  Surgeon: Teddy Huber MD;  Location: [facility] 2ND FLR;  Service: Vascular;  Laterality: Left;    REMOVAL OF NAIL OF DIGIT Left 3/9/2021    Procedure: REMOVAL, NAIL, DIGIT;  Surgeon: Rosio Mayes DPM;  Location: [facility] OR;  Service: Podiatry;  Laterality: Left;    RIGHT HEART CATHETERIZATION Right 5/10/2023    Procedure: INSERTION, CATHETER, RIGHT HEART;  Surgeon: Romulo Queen MD;  Location: [facility] CATH LAB;  Service: Cardiology;  Laterality: Right;    THROMBECTOMY Left 3/4/2021    Procedure: THROMBECTOMY, LEFT LOWER EXTREMITY BYPASS GRAFT, ANGIOGRAM, POSSIBLE INTERVENTION, POSSIBLE LEFT LOWER EXTREMITY BYPASS;  Surgeon: Teddy Huber MD;  Location: [facility] OR;  Service: Vascular;  Laterality: Left;    THROMBECTOMY Left 4/29/2021    Procedure: GRAFT THROMBECTOMY, LEFT LOWER EXTREMITY;  Surgeon: Teddy Huber MD;  Location: [facility] OR 2ND FLR;  Service: Vascular;  Laterality: Left;  14.5 min  1179.85 mGy  341.01 Gycm2  240 ml dye    THROMBECTOMY  10/22/2021    Procedure: THROMBECTOMY;  Surgeon: Saad Arenas MD;  Location: [facility] CATH LAB/EP;  Service: Cardiology;;       Review of patient's allergies indicates:   Allergen Reactions    Ciprofloxacin Itching    Contrast media      Kidney injury    Iodine      Kidney injury     "Pcn [penicillins]      Rash; tolerated ceftriaxone on 1/13/20     Current Facility-Administered Medications   Medication Frequency    dextrose 10% bolus 125 mL 125 mL PRN    dextrose 10% bolus 125 mL 125 mL PRN    dextrose 10% bolus 250 mL 250 mL PRN    dextrose 10% bolus 250 mL 250 mL PRN    glucagon (human recombinant) injection 1 mg PRN    glucagon (human recombinant) injection 1 mg PRN    glucose chewable tablet 16 g PRN    glucose chewable tablet 24 g PRN    insulin aspart U-100 pen 0-5 Units Q6H PRN    insulin detemir U-100 (Levemir) pen 6 Units QHS    naloxone 0.4 mg/mL injection 0.02 mg PRN    sodium chloride 0.9% flush 10 mL PRN     Current Outpatient Medications   Medication    allopurinoL (ZYLOPRIM) 100 MG tablet    apixaban (ELIQUIS) 5 mg Tab    atorvastatin (LIPITOR) 80 MG tablet    blood sugar diagnostic Strp    blood-glucose meter Misc    calcitRIOL (ROCALTROL) 0.5 MCG Cap    calcium acetate,phosphat bind, (PHOSLO) 667 mg capsule    carvediloL (COREG) 6.25 MG tablet    clopidogreL (PLAVIX) 75 mg tablet    famotidine (PEPCID) 20 MG tablet    furosemide (LASIX) 40 MG tablet    hydrALAZINE (APRESOLINE) 50 MG tablet    hydrOXYzine HCL (ATARAX) 25 MG tablet    insulin aspart, niacinamide, (FIASP FLEXTOUCH U-100 INSULIN) 100 unit/mL (3 mL) InPn    insulin detemir U-100, Levemir, 100 unit/mL (3 mL) SubQ InPn pen    isosorbide dinitrate (ISORDIL) 20 MG tablet    lancets Misc    pen needle, diabetic 32 gauge x 5/32" Ndle    pregabalin (LYRICA) 100 MG capsule    sertraline (ZOLOFT) 100 MG tablet    sevelamer carbonate (RENVELA) 800 mg Tab    sodium bicarbonate 650 MG tablet    traZODone (DESYREL) 50 MG tablet     Family History       Problem Relation (Age of Onset)    Diabetes Mother, Father, Paternal Grandmother    Heart disease Maternal Grandmother    No Known Problems Maternal Grandfather, Paternal Grandfather          Tobacco Use    Smoking status: Former    Smokeless tobacco: Never   Substance and Sexual " Activity    Alcohol use: No    Drug use: Yes     Types: Marijuana     Comment: occassional    Sexual activity: Yes     Partners: Male     Review of Systems   Constitutional: Negative.    HENT: Negative.     Eyes: Negative.    Cardiovascular: Negative.    Gastrointestinal: Negative.    Endocrine: Negative.    Genitourinary: Negative.    Musculoskeletal: Negative.    Neurological: Negative.    Psychiatric/Behavioral:  Positive for confusion.      Objective:     Vital Signs (Most Recent):  Temp: 98 °F (36.7 °C) (02/05/24 1200)  Pulse: 87 (02/05/24 1200)  Resp: 19 (02/05/24 1200)  BP: 114/69 (02/05/24 1200)  SpO2: 95 % (02/05/24 1200) Vital Signs (24h Range):  Temp:  [97.4 °F (36.3 °C)-98.5 °F (36.9 °C)] 98 °F (36.7 °C)  Pulse:  [68-87] 87  Resp:  [12-25] 19  SpO2:  [94 %-100 %] 95 %  BP: (114-174)/() 114/69     Weight: 78 kg (171 lb 15.3 oz) (02/04/24 1557)  Body mass index is 52.47 kg/m².  Body surface area is 1.63 meters squared.    I/O last 3 completed shifts:  In: -   Out: 250 [Urine:250]     Physical Exam  Vitals and nursing note reviewed.   Constitutional:       General: She is not in acute distress.     Appearance: She is ill-appearing. She is not toxic-appearing.   HENT:      Head: Normocephalic and atraumatic.      Mouth/Throat:      Mouth: Mucous membranes are dry.      Pharynx: Oropharynx is clear.   Eyes:      General: No scleral icterus.     Conjunctiva/sclera: Conjunctivae normal.   Cardiovascular:      Rate and Rhythm: Normal rate. Rhythm irregular.      Pulses: Normal pulses.      Heart sounds: No murmur heard.  Pulmonary:      Effort: Pulmonary effort is normal.   Abdominal:      General: There is no distension.      Palpations: Abdomen is soft.      Tenderness: There is no abdominal tenderness.   Musculoskeletal:      Right lower leg: No edema.      Left lower leg: No edema.      Comments: Bilateral AKA   Skin:     General: Skin is warm and dry.   Neurological:      Mental Status: She is alert.  She is disoriented.      Comments: Reflexes were intact   Psychiatric:         Behavior: Behavior is cooperative.      Significant Labs:  CBC:   Recent Labs   Lab 02/05/24  0506   WBC 4.19  4.19   RBC 4.03  4.03   HGB 12.5  12.5   HCT 38.9  38.9     186   MCV 97  97   MCH 31.0  31.0   MCHC 32.1  32.1     CMP:   Recent Labs   Lab 02/05/24  0506   *   CALCIUM 9.5   ALBUMIN 2.9*   PROT 7.9   *   K 3.5   CO2 22*   CL 99   BUN 41*   CREATININE 5.3*   ALKPHOS 324*   ALT 16   AST 29   BILITOT 0.6     All labs within the past 24 hours have been reviewed.

## 2024-02-05 NOTE — ASSESSMENT & PLAN NOTE
Patient is a 58-year-old female who is presenting with a 24 hour history of altered mental status and unresponsiveness.  Patient had her HD yesterday and following that has been increasingly altered.  She has had a prior episode that has been associated with gabapentin buildup as she was switched to pregabalin..  The  at bedside provide supplemental history and states that the patient's Seroquel dose was also doubled in the last month.  Additionally the patient takes hydroxyzine, trazodone, famotidine, and quetiapine which all have antihistamine allergic properties and could be contributing to AMS.  She has no signs or symptoms of any infection.  An ammonia level was normal.  Her vitals are overall stable.  Blood pressure is elevated but not to the point where hypertensive encephalopathy was a concern.  She does have some mild elevations in her BNP with interstitial infiltrates but was dialyzed the day before so felt to be less likely.  There is no profound acidosis or uremia and the patients  denies any substance use.    In the setting of pregabalin use and multiple sedating medications, I favor that this is encephalopathy secondary to medication.  I will discontinue many of her antihistamine medications as well as her pregabalin and continue to observe.    Hydroxyzine + quetiapine + pregabalin + trazodone - sedation and CNS depression interactions  Hydroxyzine + quetiapine + trazodone + sertraline - increased risk of serotonin syndrome or cardiac arrhythmias    Plan:  - observe  - Swallow study prior to removing NPO status  - Can consider EEG but felt to be lower on the differential.

## 2024-02-05 NOTE — HPI
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 Per EMR She is accompanied by her  who states that the patient woke up this am and was completely unresponsive to verbal cues this morning. Day prior, the patient had been having visual hallucinations Claritin her  where she was trying to talk to people in the room who were not there.  Seemed as though that she was not having full conversations at the time. Pt is alert and oriented during my exam. Last HD prior to presentation was Saturday 2/3. Of note she had there was 1 prior episode of this happening that was related to gabapentin overdose however she has not been taking any gabapentin in his instead using only pregabalin. She also had her quetiapine doubled approximately 1 month ago and was prescribed a new medication for sleep but is unclear as to which one.  A CT head showed chronic microvascular changes but no acute changes and a chest x-ray was largely unremarkable outside of some interstitial infiltrates suggestive of early pulmonary edema. Nephrology consulted for ESRD.     HPI obtained via EMR and patient interview

## 2024-02-05 NOTE — ASSESSMENT & PLAN NOTE
Noted. Appears to have decent mobility and per the , is able to get to her wheelchair, wash, and use the restroom independently. The last two days she has not been able to which was one of the precipitating factors of why she presented.

## 2024-02-06 PROBLEM — I10 ESSENTIAL HYPERTENSION: Status: RESOLVED | Noted: 2018-05-05 | Resolved: 2024-02-06

## 2024-02-06 LAB
ALBUMIN SERPL BCP-MCNC: 2.7 G/DL (ref 3.5–5.2)
ALP SERPL-CCNC: 361 U/L (ref 55–135)
ALT SERPL W/O P-5'-P-CCNC: 17 U/L (ref 10–44)
ANION GAP SERPL CALC-SCNC: 14 MMOL/L (ref 8–16)
AST SERPL-CCNC: 41 U/L (ref 10–40)
BASOPHILS # BLD AUTO: 0.07 K/UL (ref 0–0.2)
BASOPHILS NFR BLD: 1.3 % (ref 0–1.9)
BILIRUB SERPL-MCNC: 0.7 MG/DL (ref 0.1–1)
BUN SERPL-MCNC: 17 MG/DL (ref 6–20)
BUN SERPL-MCNC: 46 MG/DL (ref 6–20)
BUN SERPL-MCNC: 47 MG/DL (ref 6–20)
CALCIUM SERPL-MCNC: 8.7 MG/DL (ref 8.7–10.5)
CHLORIDE SERPL-SCNC: 98 MMOL/L (ref 95–110)
CO2 SERPL-SCNC: 24 MMOL/L (ref 23–29)
CREAT SERPL-MCNC: 6.3 MG/DL (ref 0.5–1.4)
DIFFERENTIAL METHOD BLD: ABNORMAL
EOSINOPHIL # BLD AUTO: 0.3 K/UL (ref 0–0.5)
EOSINOPHIL NFR BLD: 4.7 % (ref 0–8)
ERYTHROCYTE [DISTWIDTH] IN BLOOD BY AUTOMATED COUNT: 16.6 % (ref 11.5–14.5)
EST. GFR  (NO RACE VARIABLE): 7.2 ML/MIN/1.73 M^2
GLUCOSE SERPL-MCNC: 101 MG/DL (ref 70–110)
HBV SURFACE AB SER-ACNC: <3 MIU/ML
HBV SURFACE AB SER-ACNC: NORMAL M[IU]/ML
HBV SURFACE AG SERPL QL IA: NORMAL
HCT VFR BLD AUTO: 36.9 % (ref 37–48.5)
HGB BLD-MCNC: 11.6 G/DL (ref 12–16)
IMM GRANULOCYTES # BLD AUTO: 0.01 K/UL (ref 0–0.04)
IMM GRANULOCYTES NFR BLD AUTO: 0.2 % (ref 0–0.5)
LYMPHOCYTES # BLD AUTO: 1.1 K/UL (ref 1–4.8)
LYMPHOCYTES NFR BLD: 20.1 % (ref 18–48)
MAGNESIUM SERPL-MCNC: 2.2 MG/DL (ref 1.6–2.6)
MCH RBC QN AUTO: 30.7 PG (ref 27–31)
MCHC RBC AUTO-ENTMCNC: 31.4 G/DL (ref 32–36)
MCV RBC AUTO: 98 FL (ref 82–98)
MONOCYTES # BLD AUTO: 0.7 K/UL (ref 0.3–1)
MONOCYTES NFR BLD: 13.1 % (ref 4–15)
NEUTROPHILS # BLD AUTO: 3.4 K/UL (ref 1.8–7.7)
NEUTROPHILS NFR BLD: 60.6 % (ref 38–73)
NRBC BLD-RTO: 0 /100 WBC
PHOSPHATE SERPL-MCNC: 5.4 MG/DL (ref 2.7–4.5)
PLATELET # BLD AUTO: 189 K/UL (ref 150–450)
PMV BLD AUTO: 11.4 FL (ref 9.2–12.9)
POCT GLUCOSE: 111 MG/DL (ref 70–110)
POCT GLUCOSE: 116 MG/DL (ref 70–110)
POCT GLUCOSE: 116 MG/DL (ref 70–110)
POCT GLUCOSE: 144 MG/DL (ref 70–110)
POTASSIUM SERPL-SCNC: 4 MMOL/L (ref 3.5–5.1)
PROT SERPL-MCNC: 7.5 G/DL (ref 6–8.4)
RBC # BLD AUTO: 3.78 M/UL (ref 4–5.4)
SODIUM SERPL-SCNC: 136 MMOL/L (ref 136–145)
WBC # BLD AUTO: 5.56 K/UL (ref 3.9–12.7)

## 2024-02-06 PROCEDURE — 97530 THERAPEUTIC ACTIVITIES: CPT | Mod: HCNC

## 2024-02-06 PROCEDURE — 80053 COMPREHEN METABOLIC PANEL: CPT | Mod: HCNC | Performed by: STUDENT IN AN ORGANIZED HEALTH CARE EDUCATION/TRAINING PROGRAM

## 2024-02-06 PROCEDURE — 99223 1ST HOSP IP/OBS HIGH 75: CPT | Mod: HCNC,,,

## 2024-02-06 PROCEDURE — A4216 STERILE WATER/SALINE, 10 ML: HCPCS | Mod: HCNC | Performed by: STUDENT IN AN ORGANIZED HEALTH CARE EDUCATION/TRAINING PROGRAM

## 2024-02-06 PROCEDURE — 97162 PT EVAL MOD COMPLEX 30 MIN: CPT | Mod: HCNC

## 2024-02-06 PROCEDURE — 83735 ASSAY OF MAGNESIUM: CPT | Mod: HCNC | Performed by: STUDENT IN AN ORGANIZED HEALTH CARE EDUCATION/TRAINING PROGRAM

## 2024-02-06 PROCEDURE — 99232 SBSQ HOSP IP/OBS MODERATE 35: CPT | Mod: HCNC,,, | Performed by: NURSE PRACTITIONER

## 2024-02-06 PROCEDURE — 90935 HEMODIALYSIS ONE EVALUATION: CPT | Mod: HCNC

## 2024-02-06 PROCEDURE — 97165 OT EVAL LOW COMPLEX 30 MIN: CPT | Mod: HCNC

## 2024-02-06 PROCEDURE — 84520 ASSAY OF UREA NITROGEN: CPT | Mod: 91,HCNC | Performed by: NURSE PRACTITIONER

## 2024-02-06 PROCEDURE — 36415 COLL VENOUS BLD VENIPUNCTURE: CPT | Mod: HCNC | Performed by: STUDENT IN AN ORGANIZED HEALTH CARE EDUCATION/TRAINING PROGRAM

## 2024-02-06 PROCEDURE — 25000003 PHARM REV CODE 250: Mod: HCNC | Performed by: NURSE PRACTITIONER

## 2024-02-06 PROCEDURE — 84100 ASSAY OF PHOSPHORUS: CPT | Mod: HCNC | Performed by: STUDENT IN AN ORGANIZED HEALTH CARE EDUCATION/TRAINING PROGRAM

## 2024-02-06 PROCEDURE — 21400001 HC TELEMETRY ROOM: Mod: HCNC

## 2024-02-06 PROCEDURE — 5A1D70Z PERFORMANCE OF URINARY FILTRATION, INTERMITTENT, LESS THAN 6 HOURS PER DAY: ICD-10-PCS | Performed by: NURSE PRACTITIONER

## 2024-02-06 PROCEDURE — 63600175 PHARM REV CODE 636 W HCPCS: Mod: HCNC | Performed by: NURSE PRACTITIONER

## 2024-02-06 PROCEDURE — 25000003 PHARM REV CODE 250: Mod: HCNC | Performed by: STUDENT IN AN ORGANIZED HEALTH CARE EDUCATION/TRAINING PROGRAM

## 2024-02-06 PROCEDURE — 87340 HEPATITIS B SURFACE AG IA: CPT | Mod: HCNC | Performed by: NURSE PRACTITIONER

## 2024-02-06 PROCEDURE — 97535 SELF CARE MNGMENT TRAINING: CPT | Mod: HCNC

## 2024-02-06 PROCEDURE — 85025 COMPLETE CBC W/AUTO DIFF WBC: CPT | Mod: HCNC | Performed by: STUDENT IN AN ORGANIZED HEALTH CARE EDUCATION/TRAINING PROGRAM

## 2024-02-06 PROCEDURE — 25000003 PHARM REV CODE 250: Mod: HCNC | Performed by: HOSPITALIST

## 2024-02-06 PROCEDURE — 86706 HEP B SURFACE ANTIBODY: CPT | Mod: HCNC | Performed by: NURSE PRACTITIONER

## 2024-02-06 RX ORDER — CARVEDILOL 6.25 MG/1
6.25 TABLET ORAL 2 TIMES DAILY
Status: DISCONTINUED | OUTPATIENT
Start: 2024-02-06 | End: 2024-02-07 | Stop reason: HOSPADM

## 2024-02-06 RX ORDER — HEPARIN SODIUM 1000 [USP'U]/ML
1000 INJECTION, SOLUTION INTRAVENOUS; SUBCUTANEOUS
Status: DISCONTINUED | OUTPATIENT
Start: 2024-02-06 | End: 2024-02-07 | Stop reason: HOSPADM

## 2024-02-06 RX ORDER — SODIUM CHLORIDE 9 MG/ML
INJECTION, SOLUTION INTRAVENOUS ONCE
Status: COMPLETED | OUTPATIENT
Start: 2024-02-06 | End: 2024-02-06

## 2024-02-06 RX ORDER — ISOSORBIDE DINITRATE 20 MG/1
40 TABLET ORAL 3 TIMES DAILY
Status: DISCONTINUED | OUTPATIENT
Start: 2024-02-06 | End: 2024-02-07 | Stop reason: HOSPADM

## 2024-02-06 RX ADMIN — APIXABAN 5 MG: 5 TABLET, FILM COATED ORAL at 09:02

## 2024-02-06 RX ADMIN — Medication 10 ML: at 09:02

## 2024-02-06 RX ADMIN — CARVEDILOL 6.25 MG: 6.25 TABLET, FILM COATED ORAL at 08:02

## 2024-02-06 RX ADMIN — SODIUM CHLORIDE: 9 INJECTION, SOLUTION INTRAVENOUS at 02:02

## 2024-02-06 RX ADMIN — CLOPIDOGREL BISULFATE 75 MG: 75 TABLET ORAL at 09:02

## 2024-02-06 RX ADMIN — HEPARIN SODIUM 1000 UNITS: 1000 INJECTION, SOLUTION INTRAVENOUS; SUBCUTANEOUS at 06:02

## 2024-02-06 RX ADMIN — APIXABAN 5 MG: 5 TABLET, FILM COATED ORAL at 08:02

## 2024-02-06 RX ADMIN — ISOSORBIDE DINITRATE 40 MG: 20 TABLET ORAL at 08:02

## 2024-02-06 RX ADMIN — ATORVASTATIN CALCIUM 80 MG: 40 TABLET, FILM COATED ORAL at 09:02

## 2024-02-06 RX ADMIN — ISOSORBIDE DINITRATE 40 MG: 20 TABLET ORAL at 03:02

## 2024-02-06 RX ADMIN — INSULIN DETEMIR 2 UNITS: 100 INJECTION, SOLUTION SUBCUTANEOUS at 08:02

## 2024-02-06 RX ADMIN — SODIUM BICARBONATE 650 MG: 325 TABLET ORAL at 09:02

## 2024-02-06 RX ADMIN — SODIUM BICARBONATE 650 MG: 325 TABLET ORAL at 08:02

## 2024-02-06 NOTE — PT/OT/SLP EVAL
Occupational Therapy   Co-Evaluation  Co-evaluation/treatment performed due to patient's multiple deficits requiring two skilled therapists to appropriately and safely assess patient's strength and endurance while facilitating functional tasks in addition to accommodating for patient's activity tolerance.        Name: Suyapa Connelly  MRN: [id number]  Admitting Diagnosis: Encephalopathy  Recent Surgery: * No surgery found *      Recommendations:     Discharge Recommendations:    Discharge Equipment Recommendations:  wheelchair  Barriers to discharge:  None    Assessment:     Suyapa Connelly is a 58 y.o. female with a medical diagnosis of Encephalopathy.  She presents with the following performance deficits affecting function: weakness, impaired self care skills, impaired endurance, impaired functional mobility, impaired balance, impaired cognition, impaired fine motor, decreased upper extremity function.  Pt agreeable to therapy and tolerated fairly. Pt was cooperative with OT/PT but demonstrated signs of emotional distress. When asked to explain what she was feeling, pt did not elaborate as to why she was crying. However, towards the end of the session, pt appeared to be less emotional and thanked OT/PT for stopping by to help her.     Pt remains limited in ADLs, functional mobility, and functional transfers and is currently not performing tasks at [facility]. Pt would continue to benefit from skilled OT services to maximize functional independence with ADLs and functional mobility, reduce caregiver burden, and facilitate safe discharge in the least restrictive environment.     Rehab Prognosis: Good; patient would benefit from acute skilled OT services to address these deficits and reach maximum level of function.       Plan:     Patient to be seen 4 x/week to address the above listed problems via self-care/home management, therapeutic activities, therapeutic exercises, neuromuscular re-education  Plan of Care Expires:  03/06/24  Plan of Care Reviewed with: patient    Subjective     Chief Complaint: none reported  Patient/Family Comments/goals: pt agreeable to OT/PT    Occupational Profile:  Living Environment: pt lives with  in a SSH with small threshold. Bathroom: walk-in shower with built in bench with grab bars  Previous level of function: Mod I with ADLs & functional transfers  Roles and Routines: none reported  Equipment Used at Home: bath bench, wheelchair, grab bar  Assistance upon Discharge: Spouse    Pain/Comfort:  Pain Rating 1: 0/10    Patients cultural, spiritual, Lutheran conflicts given the current situation: no    Objective:     Communicated with: RN prior to session.  Patient found HOB elevated with telemetry upon OT entry to room.    General Precautions: Standard, fall  Orthopedic Precautions:  (B AKA)  Braces: N/A  Respiratory Status: Room air    Occupational Performance:    Bed Mobility:    Patient completed Rolling to Left with  contact guard assistance  Patient completed Rolling to Right with moderate assistance  Patient completed Supine to Sit with minimum assistance  Patient completed Sit to Supine with minimum assistance    Functional Mobility/Transfers:  Patient has B AKA    Activities of Daily Living:  Grooming: Set-up assistance for facial hygiene, minimum assistance for thoroughness   Set-up assistance to complete oral hygiene in bed    Upper Body Dressing: moderate assistance to dof/don gown    Toileting: Pt was found soiled during session and required total assistance to complete bottom hygiene     Cognitive/Visual Perceptual:  Cognitive/Psychosocial Skills:     -       Oriented to: Person, Place, and Month   -       Follows Commands/attention:Follows multistep  commands    Physical Exam:  Dominant hand: -       Right  Upper Extremity Range of Motion:     -       Right Upper Extremity: WFL  -       Left Upper Extremity: WFL  Upper Extremity Strength:    -       Right Upper Extremity: WFL  -        Left Upper Extremity: WFL   Strength:    -       Right Upper Extremity: Impaired 2/2 flexed digits  -       Left Upper Extremity: WFL  Fine Motor Coordination:    -       Impaired  Right hand thumb/finger opposition skills and Right hand, manipulation of objects   Gross motor coordination:   WFL    AMPAC 6 Click ADL:  AMPAC Total Score: 15    Treatment & Education:  -Education on task modification to maximize safety and (I) during ADLs and mobility  -Education on importance of movement to improve overall activity tolerance and promote recovery  -Pt educated to call for assistance and to transfer with hospital staff only  -Provided education regarding role of OT & POC with pt verbalizing understanding. Pt had no further questions & when asked whether there were any concerns pt reported none.     Patient left HOB elevated with all lines intact, call button in reach, and RN notified    GOALS:   Multidisciplinary Problems       Occupational Therapy Goals          Problem: Occupational Therapy    Goal Priority Disciplines Outcome Interventions   Occupational Therapy Goal     OT, PT/OT Ongoing, Progressing    Description: Goals to be met by: 03/06/2024     Patient will increase functional independence with ADLs by performing:    UE Dressing with Modified [place].  LE Dressing with Modified [place].  Grooming while seated with Set-up Assistance.  Toileting from bedside commode with Contact Guard Assistance for hygiene and clothing management.   Supine to sit with Modified [place].  Wheelchair transfer from bed with Contact Guard Assistance                         History:     Past Medical History:   Diagnosis Date    Anxiety     Chronic pain syndrome     CKD (chronic kidney disease), stage III     Depression     Diabetes mellitus     type 2    Diabetes mellitus, type 2     GERD (gastroesophageal reflux disease)     Hyperemesis 3/23/2021    Hypokalemia 3/23/2021    Infection of below knee amputation  stump 3/12/2022    Osteomyelitis     Osteomyelitis of left foot 4/30/2021    Ulcer of left foot     Vaginal delivery     x1         Past Surgical History:   Procedure Laterality Date    ABOVE-KNEE AMPUTATION Left 5/18/2021    Procedure: AMPUTATION, ABOVE KNEE;  Surgeon: Teddy Huber MD;  Location: [address];  Service: Vascular;  Laterality: Left;    ABOVE-KNEE AMPUTATION Right 3/18/2022    Procedure: AMPUTATION, ABOVE KNEE;  Surgeon: DAYNE Florez II, MD;  Location: [address];  Service: Vascular;  Laterality: Right;    Angiogram - Right Extremity Right 7/9/15    angiogram-left leg  10/6/15    ANGIOGRAPHY OF LOWER EXTREMITY Left 4/29/2021    Procedure: ANGIOGRAM, LOWER EXTREMITY;  Surgeon: Teddy Huber MD;  Location: [address];  Service: Vascular;  Laterality: Left;    BELOW KNEE AMPUTATION OF LOWER EXTREMITY Right 12/28/2021    Procedure: AMPUTATION, BELOW KNEE;  Surgeon: Kaitlyn Rojas MD;  Location: [facility];  Service: General;  Laterality: Right;    CATHETERIZATION OF BOTH LEFT AND RIGHT HEART N/A 12/18/2019    Procedure: CATHETERIZATION, HEART, BOTH LEFT AND RIGHT;  Surgeon: Que Fernando III, MD;  Location: [facility] CATH LAB;  Service: Cardiology;  Laterality: N/A;    CORONARY ANGIOGRAPHY N/A 12/18/2019    Procedure: ANGIOGRAM, CORONARY ARTERY;  Surgeon: Que Fernando III, MD;  Location: [facility] CATH LAB;  Service: Cardiology;  Laterality: N/A;    CORONARY ANGIOGRAPHY INCLUDING BYPASS GRAFTS WITH CATHETERIZATION OF LEFT HEART N/A 7/28/2020    Procedure: ANGIOGRAM, CORONARY, INCLUDING BYPASS GRAFT, WITH LEFT HEART CATHETERIZATION, 9 am;  Surgeon: Rachel Easley MD;  Location: [facility] CATH LAB;  Service: Cardiology;  Laterality: N/A;    CORONARY ARTERY BYPASS GRAFT (CABG) N/A 1/14/2020    Procedure: CORONARY ARTERY BYPASS GRAFT (CABG) x 1     Off Pump;  Surgeon: Huang Altamirano MD;  Location: [address];  Service: Cardiovascular;  Laterality: N/A;    CREATION  OF FEMORAL-TIBIAL ARTERY BYPASS Left 4/29/2021    Procedure: CREATION, BYPASS, ARTERIAL, FEMORAL TO ANTERIOR TIBIAL;  Surgeon: Teddy Huber MD;  [facility];  Service: Vascular;  Laterality: Left;    CREATION OF FEMOROPOPLITEAL ARTERIAL BYPASS USING GRAFT Left 8/18/2020    Procedure: CREATION, BYPASS, ARTERIAL, FEMORAL TO POPLITEAL, USING GRAFT, LEFT LOWER EXTREMITY;  Surgeon: Teddy Huber MD;  Location: [facility] OR;  Service: Vascular;  Laterality: Left;  REQUEST 7:15 A.M. START----COVID NEGATIVE ON 8/17  1ST CASE STARTKENYA DUEÑAS ON 8/7/2020 @ 942AM-LO  RN PREOP 8/12/2020   T/S-----CLEARED BY CARDS-------PENDING INSURANCE    DEBRIDEMENT OF FOOT Left 9/8/2020    Procedure: DEBRIDEMENT, FOOT;  Surgeon: Rosio Mayes DPM;  Location: [facility] OR;  Service: Podiatry;  Laterality: Left;  request neoxx .   RN Pre Op 9-4-2020, Covid negative on 9/5/20. C A    DEBRIDEMENT OF FOOT  3/4/2021    Procedure: DEBRIDEMENT, FOOT;  Surgeon: Teddy Huber MD;  Location: [facility] OR;  Service: Vascular;;    DEBRIDEMENT OF FOOT Left 3/9/2021    Procedure: DEBRIDEMENT, FOOT, bone biopsy;  Surgeon: Rosio Mayes DPM;  Location: [facility] OR;  Service: Podiatry;  Laterality: Left;  Request neoxx---COVID IN AM  REQUESTING NOON START  RN Phone Pre op.On Blood thinners Plavix and Eliquis.  Covid am of surgery. C A    DEBRIDEMENT OF FOOT Left 5/4/2021    Procedure: DEBRIDEMENT, FOOT;  Surgeon: Farooq Morley DPM;  Location: [facility] OR 2ND FLR;  Service: Podiatry;  Laterality: Left;    INSERTION OF TUNNELED CENTRAL VENOUS HEMODIALYSIS CATHETER N/A 1/27/2020    Procedure: Insertion, Catheter, Central Venous, Hemodialysis;  Surgeon: ESTEBAN Gomez III, MD;  Location: [facility] CATH LAB;  Service: Peripheral Vascular;  Laterality: N/A;    INSERTION OF TUNNELED CENTRAL VENOUS HEMODIALYSIS CATHETER  5/10/2023    Procedure: Insertion, Catheter, Central Venous, Hemodialysis;  Surgeon: Romulo Queen MD;  Location: [facility]  CATH LAB;  Service: Cardiology;;    PERCUTANEOUS TRANSLUMINAL ANGIOPLASTY N/A 3/4/2021    Procedure: PTA (ANGIOPLASTY, PERCUTANEOUS, TRANSLUMINAL);  Surgeon: Teddy Huber MD;  Location: [facility] OR;  Service: Vascular;  Laterality: N/A;    REMOVAL OF ARTERIOVENOUS GRAFT Left 5/27/2021    Procedure: REMOVAL, GRAFT, LEFT LOWER EXTREMITY, WOUND EXPLORATION;  Surgeon: Teddy Huber MD;  Location: [facility] OR 2ND FLR;  Service: Vascular;  Laterality: Left;    REMOVAL OF NAIL OF DIGIT Left 3/9/2021    Procedure: REMOVAL, NAIL, DIGIT;  Surgeon: Rosio Mayes DPM;  Location: [facility] OR;  Service: Podiatry;  Laterality: Left;    RIGHT HEART CATHETERIZATION Right 5/10/2023    Procedure: INSERTION, CATHETER, RIGHT HEART;  Surgeon: Romulo Queen MD;  Location: [facility] CATH LAB;  Service: Cardiology;  Laterality: Right;    THROMBECTOMY Left 3/4/2021    Procedure: THROMBECTOMY, LEFT LOWER EXTREMITY BYPASS GRAFT, ANGIOGRAM, POSSIBLE INTERVENTION, POSSIBLE LEFT LOWER EXTREMITY BYPASS;  Surgeon: Teddy Huber MD;  Location: [facility] OR;  Service: Vascular;  Laterality: Left;    THROMBECTOMY Left 4/29/2021    Procedure: GRAFT THROMBECTOMY, LEFT LOWER EXTREMITY;  Surgeon: Teddy Huber MD;  Location: [facility] OR 2ND FLR;  Service: Vascular;  Laterality: Left;  14.5 min  1179.85 mGy  341.01 Gycm2  240 ml dye    THROMBECTOMY  10/22/2021    Procedure: THROMBECTOMY;  Surgeon: Saad Arenas MD;  Location: [facility] CATH LAB/EP;  Service: Cardiology;;       Time Tracking:     OT Date of Treatment: 02/06/24  OT Start Time: 1015  OT Stop Time: 1046  OT Total Time (min): 31 min    Billable Minutes:Evaluation 10  Self Care/Home Management 21    2/6/2024

## 2024-02-06 NOTE — HOSPITAL COURSE
Patient was monitored conservatively.  Patient's mental status had markedly improved after witholding home CNS depressants and anticholinergics.  EEG showed nonspecific slowing but no definitive epileptiform activity.  MRI not showing any concrete definitive new significant findings. Out of 's concern for pt's hallucinations, Psychiatry evaluated the pt; no PEC needed; rec'd outpt f/u with primary provider. Pt also provided lifevest for low EF noted on chart; referrals placed for outpt cards f/u.    Patient has met maximum benefit from hospitalization is clinically stable for discharge.  Attempts to contact  unsuccessful after initial encounter w/ .    Clear lungs bilaterally, unlabored breathing, on room air, no cyanosis  Heart sounds indicate a regular rate and rhythm   Follows motor commands   No focal motor upper extremity deficits   Bilateral lower extremity amputee   Sleeping, easily woken, no acute distress   Oriented to self and place, partially to time

## 2024-02-06 NOTE — ASSESSMENT & PLAN NOTE
Improving but not back to baseline yet per   Given patient's reported emotional lability of crying as well as hallucinations per the 's experience, we will consult Psychiatry   -EEG unremarkable for any definitive epileptiform activity, showing nonspecific slowing   -MRI findings discussed with stroke team, no definitive infarct noted, no acute significant findings

## 2024-02-06 NOTE — PLAN OF CARE
Problem: Adult Inpatient Plan of Care  Goal: Plan of Care Review  Outcome: Ongoing, Progressing  Goal: Patient-Specific Goal (Individualized)  Outcome: Ongoing, Progressing  Goal: Absence of Hospital-Acquired Illness or Injury  Outcome: Ongoing, Progressing  Goal: Optimal Comfort and Wellbeing  Outcome: Ongoing, Progressing  Goal: Readiness for Transition of Care  Outcome: Ongoing, Progressing     Problem: Bariatric Environmental Safety  Goal: Safety Maintained with Care  Outcome: Ongoing, Progressing     Problem: Diabetes Comorbidity  Goal: Blood Glucose Level Within Targeted Range  Outcome: Ongoing, Progressing     Problem: Infection  Goal: Absence of Infection Signs and Symptoms  Outcome: Ongoing, Progressing     Problem: Skin Injury Risk Increased  Goal: Skin Health and Integrity  Outcome: Ongoing, Progressing

## 2024-02-06 NOTE — NURSING
Nurses Note -- 4 Eyes      2/5/2024   9:55 PM      Skin assessed during: Admit      [x] No Altered Skin Integrity Present    []Prevention Measures Documented      [] Yes- Altered Skin Integrity Present or Discovered   [] LDA Added if Not in Epic (Describe Wound)   [] New Altered Skin Integrity was Present on Admit and Documented in LDA   [] Wound Image Taken    Wound Care Consulted? No    Attending Nurse:  David Lin RN/Staff Member:   Mario Aguilar RN

## 2024-02-06 NOTE — SUBJECTIVE & OBJECTIVE
Interval History:  MRI findings discussed with stroke team, feel that this is more artifact than actual infarct.  Patient herself today denies any chest pain.  Blood pressure a bit labile.   at the bedside, you reports that she is intermittent terms of lucidity, reports she again when somewhat delirious today.     Review of Systems  Objective:     Vital Signs (Most Recent):  Temp: 98.3 °F (36.8 °C) (02/06/24 1109)  Pulse: 89 (02/06/24 1109)  Resp: 18 (02/06/24 1109)  BP: (!) 175/92 (02/06/24 1109)  SpO2: 95 % (02/06/24 1300) Vital Signs (24h Range):  Temp:  [97.8 °F (36.6 °C)-99.1 °F (37.3 °C)] 98.3 °F (36.8 °C)  Pulse:  [88-97] 89  Resp:  [13-19] 18  SpO2:  [89 %-100 %] 95 %  BP: (118-179)/(56-92) 175/92     Weight: 76.7 kg (169 lb 1.5 oz)  Body mass index is 51.6 kg/m².  No intake or output data in the 24 hours ending 02/06/24 1409      Physical Exam      Clear lungs bilaterally, unlabored breathing, sleeping, easily woken, no acute distress   Heart sounds indicate a regular rate and rhythm   No facial droop, no slurred  Abdomen nondistended   Following motor commands with bilateral upper extremities, 5/5 fist    Oriented to self and place, partially to time

## 2024-02-06 NOTE — ASSESSMENT & PLAN NOTE
Ordering lifevest given EF 15% on latest echo  - HD,    - carvedilol   - hydralazine/isosorbide dinitrate restarted  - Fluid restriction to 1.5L

## 2024-02-06 NOTE — PLAN OF CARE
[facility] - Med Surg  Initial Discharge Assessment       Primary Care Provider: Magen Christensen MD    Admission Diagnosis: Encephalopathy [G93.40]  Unresponsive [R41.89]  Chest pain [R07.9]    Admission Date: 2/4/2024  Expected Discharge Date:     Transition of Care Barriers: None    Payor: HU[employer] MANAGED MEDICARE / Plan: HU[employer] MEDICARE HMO / Product Type: Capitation /     Extended Emergency Contact Information  Primary Emergency Contact: Randell Connelly  Address: [address] [place] [address]  Home Phone: [phone]  Mobile Phone: [phone]  Relation: Spouse  Secondary Emergency Contact: Cornelia Connelly  Address: [address] [place] [address]  Home Phone: [phone]  Relation: Daughter    Discharge Plan A: Home with family  Discharge Plan B: Home with family      [id number] - [place], [place] - [address]  [address]  [facility] [address]  Phone: [phone] Fax: [phone]    [facility] - [place], [place] - [address]  [address]  [facility] [address]  Phone: [phone] Fax: [phone]    [id number] - [place], [place] - [address] AT [facility] [address]   [address]  [place] [address]  Phone: [phone] Fax: [phone]    [facility] [address] [facility]  [address] [facility] [address]  Phone: [phone] Fax: [phone]      Initial Assessment (most recent)       Adult Discharge Assessment - 02/06/24 0904          Discharge Assessment    Assessment Type Discharge Planning Assessment     Confirmed/corrected address, phone number and insurance Yes     Confirmed Demographics Correct on Facesheet     Source of Information family;patient     Does patient/caregiver understand observation status Yes     Communicated DEVAN with patient/caregiver  Yes     Reason For Admission Encephalopathy     People in Home spouse     Facility Arrived From: Home     Do you expect to return to your current living situation? Yes     Do you have help at home or someone to help you manage your care at home? Yes     Who are your caregiver(s) and their phone number(s)? Randell Connelly Spouse     Prior to hospitilization cognitive status: No Deficits;Alert/Oriented     Current cognitive status: Alert/Oriented;No Deficits     Walking or Climbing Stairs Difficulty yes     Walking or Climbing Stairs ambulation difficulty, requires equipment     Mobility Management W/C     Dressing/Bathing Difficulty no     Home Accessibility wheelchair accessible     Home Layout Able to live on 1st floor     Equipment Currently Used at Home wheelchair     Readmission within 30 days? No     Patient currently being followed by outpatient case management? No     Do you currently have service(s) that help you manage your care at home? No     Do you take prescription medications? Yes     Do you have prescription coverage? Yes     Do you have any problems affording any of your prescribed medications? No     Is the patient taking medications as prescribed? yes     Who is going to help you get home at discharge? Spouse     How do you get to doctors appointments? family or friend will provide     Are you on dialysis? Yes     Dialysis Name and Scheduled days MWF [facility] 1pm     Do you take coumadin? No     Discharge Plan A Home with family     Discharge Plan B Home with family     DME Needed Upon Discharge  none     Discharge Plan discussed with: Spouse/sig other;Patient     Transition of Care Barriers None        Physical Activity    On average, how many days per week do you engage in moderate to strenuous exercise (like a brisk walk)? 0 days     On average, how many minutes do you engage in exercise at this level? 0 min        Financial Resource Strain    How hard is it for you to pay for the very basics like  food, housing, medical care, and heating? Not very hard        Housing Stability    In the last 12 months, was there a time when you were not able to pay the mortgage or rent on time? No     In the last 12 months, how many places have you lived? 1     In the last 12 months, was there a time when you did not have a steady place to sleep or slept in a shelter (including now)? No        Transportation Needs    In the past 12 months, has lack of transportation kept you from medical appointments or from getting medications? No     In the past 12 months, has lack of transportation kept you from meetings, work, or from getting things needed for daily living? No        Food Insecurity    Within the past 12 months, you worried that your food would run out before you got the money to buy more. Never true     Within the past 12 months, the food you bought just didn't last and you didn't have money to get more. Never true        Stress    Do you feel stress - tense, restless, nervous, or anxious, or unable to sleep at night because your mind is troubled all the time - these days? Only a little        Social Connections    In a typical week, how many times do you talk on the phone with family, friends, or neighbors? More than three times a week     How often do you get together with friends or relatives? More than three times a week     How often do you attend Spiritism or Islam services? Never     Do you belong to any clubs or organizations such as Spiritism groups, unions, fraternal or athletic groups, or school groups? No     How often do you attend meetings of the clubs or organizations you belong to? Never     Are you , , , , never , or living with a partner?         Alcohol Use    Q1: How often do you have a drink containing alcohol? Never     Q2: How many drinks containing alcohol do you have on a typical day when you are drinking? Patient does not drink     Q3: How often do you  have six or more drinks on one occasion? Never        OTHER    Name(s) of People in Home Randell Connelly Spouse                   Discharge Plan A and Plan B have been determined by review of patient's clinical status, future medical and therapeutic needs, and coverage/benefits for post-acute care in coordination with multidisciplinary team members.

## 2024-02-06 NOTE — ASSESSMENT & PLAN NOTE
Assessment/Plan:    No problem-specific Assessment & Plan notes found for this encounter.       Diagnoses and all orders for this visit:    Wart of face  -     Ambulatory Referral to Dermatology; Future  -     Wound culture and Gram stain    Impetigo  -     cephalexin (KEFLEX) 250 mg/5 mL suspension; Take 500 mg by mouth 3 times a day for 10 days  -     mupirocin (BACTROBAN) 2 % ointment; Apply topically 3 (three) times a day Apply to affected areas  -     Cancel: Wound culture and Gram stain  -     Cancel: Wound culture and Gram stain  -     Wound culture and Gram stain; Future  -     Wound culture and Gram stain  -     Wound culture and Gram stain    Finger injury, right, initial encounter  -     XR hand 2 vw right; Future    Sore throat  -     Throat culture  -     POCT rapid ANTIGEN strepA        Patient Instructions   It was nice to meet you both.    Seth has an exuberant wart on his upper lip. I am referring him to derm to treat this as it may need to be surgically removed. I am not comfortable freezing it in my office, due to risk of scarring in the lip region.    Seth has a red throat, strep test was negative, throat culture pending.    Seth has an impressive rash on his face, arms, and R knee. This is impetigo, a skin infection. I am treating him with keflex 3x a day for 10 days (in case his strep test becomes positive and it will also cover strep). Plus mupirocin 3x a day for 10 days. Wound culture pending. Call with worsening or new symptoms. Seek care in ED if he has fever or pain.    Seth injured his right ring finger. Please get an xray as I am worried he has a fracture due to point tenderness and swelling.      Subjective:      Patient ID: Seth Gilbert Jr. is a 15 y.o. male.    Seth is new patient here with mom for a few issues.   8th grade [employer], basketball.     3 weeks ago, he developed a wart on his upper lip.     2 days ago, woke up with rash on his face, seems different than normal acne, seems like  Ms Connelly is a 53 y/o female who is s/p CABG. Nephrology re-consulted for ROSLYN, decreased UOP, and concern for volume overload. Patient is now anuric with sCr trending up (4.4 on 1/16/20). Patient with metabolic acidosis on ABG/serum CO2 of 19. Patient now with concern for volume overload and now intubated after progressively increased oxygen requirements. Concern for pulmonary edema on CXR.     Plan/recommendations:     -Continuous SLED for metabolic clearance and volume management, UFG of 200-300cc/hr  -Strict I/O's   -Renally dose medications   -Avoid nephrotoxic medications  -Labs per CRRT protocol  -Maintain Hgb >7.0  -Will continue to follow closely   -Plan discussed with Staff, Dr Tim         "a rash. It's itchy.   He has a huge scab on his right knee for over a month. He picks at it.     He injured his right ring finger while playing football with his friends 2 days ago. He has pain and swelling on lower half of ring finger.     The following portions of the patient's history were reviewed and updated as appropriate: allergies, current medications, past family history, past medical history, past social history, past surgical history, and problem list.    Review of Systems   Constitutional: Negative.  Negative for fever.   HENT:  Negative for dental problem, ear pain, nosebleeds and sore throat.    Eyes:  Negative for visual disturbance.   Respiratory:  Negative for cough and shortness of breath.    Cardiovascular:  Negative for chest pain and palpitations.   Gastrointestinal:  Negative for abdominal pain, constipation, diarrhea, nausea and vomiting.   Endocrine: Negative for polyuria.   Genitourinary:  Negative for dysuria.   Musculoskeletal:  Positive for joint swelling. Negative for gait problem and myalgias.   Skin:  Positive for rash.   Allergic/Immunologic: Negative for immunocompromised state.   Neurological:  Negative for dizziness, weakness and headaches.   Hematological:  Negative for adenopathy.   Psychiatric/Behavioral:  Negative for behavioral problems, dysphoric mood, self-injury, sleep disturbance and suicidal ideas.          Objective:      BP (!) 104/68   Pulse 68   Temp 97.4 °F (36.3 °C) (Tympanic)   Resp 16   Ht 5' 7.52\" (1.715 m)   Wt 77.6 kg (171 lb)   BMI 26.37 kg/m²          Physical Exam  Vitals and nursing note reviewed. Exam conducted with a chaperone present (mother).   Constitutional:       Appearance: Normal appearance. He is well-developed.   HENT:      Head: Normocephalic and atraumatic.      Right Ear: External ear normal.      Left Ear: External ear normal.      Nose: Nose normal.      Mouth/Throat:      Mouth: Mucous membranes are moist.      Pharynx: Oropharynx is " clear. Posterior oropharyngeal erythema present.      Comments: 3mm protruding tan calloused growth at vermillian border of R upper lip.  Erythema to posterior OP with palatal petchiae  Eyes:      Conjunctiva/sclera: Conjunctivae normal.      Pupils: Pupils are equal, round, and reactive to light.   Cardiovascular:      Rate and Rhythm: Normal rate and regular rhythm.      Heart sounds: Normal heart sounds. No murmur heard.  Pulmonary:      Effort: Pulmonary effort is normal. No respiratory distress.      Breath sounds: Normal breath sounds. No rales.   Abdominal:      General: Bowel sounds are normal.      Palpations: Abdomen is soft.      Tenderness: There is no abdominal tenderness.   Musculoskeletal:         General: Swelling and deformity present.      Cervical back: Normal range of motion and neck supple.      Comments: R ring finger swollen from MCP to PIP, tender on palpation. Decreased flexion due to pain.   Lymphadenopathy:      Cervical: No cervical adenopathy.   Skin:     General: Skin is warm and dry.      Findings: Lesion and rash present.      Comments: Forehead, facial cheeks, right angle of mouth, chin, right forearm with scattered honey crusted lesions (ranging in size from 5 mm to 2.5 cm).  R knee with 8 x 8 cm honey crusted lesion with scattered satellite lesions.    Neurological:      Mental Status: He is alert and oriented to person, place, and time.   Psychiatric:         Behavior: Behavior normal.

## 2024-02-06 NOTE — PLAN OF CARE
[facility] - Med S[facility]  Discharge Final Note    Primary Care Provider: Magen Christensen MD    Expected Discharge Date: 2/6/2024    Final Discharge Note (most recent)       Final Note - 02/06/24 1028          Final Note    Assessment Type Final Discharge Note     Anticipated Discharge Disposition Home or Self Care     Hospital Resources/Appts/Education Provided Appointments scheduled and added to AVS        Post-Acute Status    Post-Acute Authorization Other     Other Status No Post-Acute Service Needs     Discharge Delays None known at this time                     Important Message from Medicare

## 2024-02-06 NOTE — PROGRESS NOTES
[facility] S[facility]  Nephrology  Progress Note    Patient Name: Suyapa Connelly  MRN: [id number]  Admission Date: 2/4/2024  Hospital Length of Stay: 0 days  Attending Provider: Sumanth Christina MD   Primary Care Physician: Magen Christensen MD  Principal Problem:Encephalopathy    Subjective:     Interval History: Psychiatry consulted for hallucinations. MRI indicates possibility of tiny infarct in bilateral corona radiata. HD today.      Review of patient's allergies indicates:   Allergen Reactions    Ciprofloxacin Itching    Contrast media      Kidney injury    Iodine      Kidney injury    Pcn [penicillins]      Rash; tolerated ceftriaxone on 1/13/20     Current Facility-Administered Medications   Medication Frequency    0.9%  NaCl infusion Once    apixaban tablet 5 mg BID    atorvastatin tablet 80 mg Daily    clopidogreL tablet 75 mg Daily    dextrose 10% bolus 125 mL 125 mL PRN    dextrose 10% bolus 125 mL 125 mL PRN    dextrose 10% bolus 250 mL 250 mL PRN    dextrose 10% bolus 250 mL 250 mL PRN    glucagon (human recombinant) injection 1 mg PRN    glucagon (human recombinant) injection 1 mg PRN    glucose chewable tablet 16 g PRN    glucose chewable tablet 24 g PRN    insulin aspart U-100 pen 0-5 Units Q6H PRN    insulin detemir U-100 (Levemir) pen 2 Units QHS    naloxone 0.4 mg/mL injection 0.02 mg PRN    sodium bicarbonate tablet 650 mg TID    sodium chloride 0.9% flush 10 mL PRN       Objective:     Vital Signs (Most Recent):  Temp: 98.3 °F (36.8 °C) (02/06/24 1109)  Pulse: 89 (02/06/24 1109)  Resp: 18 (02/06/24 1109)  BP: (!) 175/92 (02/06/24 1109)  SpO2: (!) 94 % (02/06/24 1109) Vital Signs (24h Range):  Temp:  [97.6 °F (36.4 °C)-99.1 °F (37.3 °C)] 98.3 °F (36.8 °C)  Pulse:  [87-97] 89  Resp:  [13-19] 18  SpO2:  [89 %-100 %] 94 %  BP: (114-179)/(56-92) 175/92     Weight: 76.7 kg (169 lb 1.5 oz) (02/05/24 1935)  Body mass index is 51.6 kg/m².  Body surface area is 1.61 meters squared.    I/O last 3  completed shifts:  In: -   Out: 250 [Urine:250]     Physical Exam  Vitals and nursing note reviewed.   Constitutional:       General: She is not in acute distress.     Appearance: She is ill-appearing. She is not toxic-appearing.   HENT:      Head: Normocephalic and atraumatic.      Mouth/Throat:      Mouth: Mucous membranes are dry.      Pharynx: Oropharynx is clear.   Eyes:      General: No scleral icterus.     Conjunctiva/sclera: Conjunctivae normal.   Cardiovascular:      Rate and Rhythm: Normal rate. Rhythm irregular.      Pulses: Normal pulses.      Heart sounds: No murmur heard.  Pulmonary:      Effort: Pulmonary effort is normal.   Abdominal:      General: There is no distension.      Palpations: Abdomen is soft.      Tenderness: There is no abdominal tenderness.   Musculoskeletal:      Right lower leg: No edema.      Left lower leg: No edema.      Comments: Bilateral AKA   Skin:     General: Skin is warm and dry.   Neurological:      Mental Status: She is alert. She is disoriented.      Comments: Reflexes were intact   Psychiatric:         Behavior: Behavior is cooperative.          Significant Labs:  CBC:   Recent Labs   Lab 02/06/24  0347   WBC 5.56   RBC 3.78*   HGB 11.6*   HCT 36.9*      MCV 98   MCH 30.7   MCHC 31.4*     CMP:   Recent Labs   Lab 02/06/24  0347      CALCIUM 8.7   ALBUMIN 2.7*   PROT 7.5      K 4.0   CO2 24   CL 98   BUN 46*   CREATININE 6.3*   ALKPHOS 361*   ALT 17   AST 41*   BILITOT 0.7     All labs within the past 24 hours have been reviewed.       Assessment/Plan:     Neuro  * Encephalopathy  -management per primary     Renal/  ESRD (end stage renal disease)  Nephrology History  iHD Schedule: MWF   Unit/MD: [facility]  Duration: 3 hours   UF: 3-4  EDW: 76 kg   Access: [facility] TDC   Residual Renal Function: oliguric   Last HD prior to presentation: 2/3/24    Assessment:   -  HD today 2/6 for metabolic clearance   -  Continue home phos binders  -  Dialysate adjusted to  current labs   -  Will obtain OP dialysis records  -  Continue to monitor intake and output, daily weights   -  Avoid nephrotoxic medication and renal dose medications to GFR              Oncology  Anemia due to chronic kidney disease, on chronic dialysis  -Hgb goal 10-11        Thank you for your consult. I will follow-up with patient. Please contact us if you have any additional questions.    Katie Monique DNP  Nephrology  [facility] - Med Surg

## 2024-02-06 NOTE — SUBJECTIVE & OBJECTIVE
Interval History: Psychiatry consulted for hallucinations. MRI indicates possibility of tiny infarct in bilateral corona radiata. HD today.      Review of patient's allergies indicates:   Allergen Reactions    Ciprofloxacin Itching    Contrast media      Kidney injury    Iodine      Kidney injury    Pcn [penicillins]      Rash; tolerated ceftriaxone on 1/13/20     Current Facility-Administered Medications   Medication Frequency    0.9%  NaCl infusion Once    apixaban tablet 5 mg BID    atorvastatin tablet 80 mg Daily    clopidogreL tablet 75 mg Daily    dextrose 10% bolus 125 mL 125 mL PRN    dextrose 10% bolus 125 mL 125 mL PRN    dextrose 10% bolus 250 mL 250 mL PRN    dextrose 10% bolus 250 mL 250 mL PRN    glucagon (human recombinant) injection 1 mg PRN    glucagon (human recombinant) injection 1 mg PRN    glucose chewable tablet 16 g PRN    glucose chewable tablet 24 g PRN    insulin aspart U-100 pen 0-5 Units Q6H PRN    insulin detemir U-100 (Levemir) pen 2 Units QHS    naloxone 0.4 mg/mL injection 0.02 mg PRN    sodium bicarbonate tablet 650 mg TID    sodium chloride 0.9% flush 10 mL PRN       Objective:     Vital Signs (Most Recent):  Temp: 98.3 °F (36.8 °C) (02/06/24 1109)  Pulse: 89 (02/06/24 1109)  Resp: 18 (02/06/24 1109)  BP: (!) 175/92 (02/06/24 1109)  SpO2: (!) 94 % (02/06/24 1109) Vital Signs (24h Range):  Temp:  [97.6 °F (36.4 °C)-99.1 °F (37.3 °C)] 98.3 °F (36.8 °C)  Pulse:  [87-97] 89  Resp:  [13-19] 18  SpO2:  [89 %-100 %] 94 %  BP: (114-179)/(56-92) 175/92     Weight: 76.7 kg (169 lb 1.5 oz) (02/05/24 1935)  Body mass index is 51.6 kg/m².  Body surface area is 1.61 meters squared.    I/O last 3 completed shifts:  In: -   Out: 250 [Urine:250]     Physical Exam  Vitals and nursing note reviewed.   Constitutional:       General: She is not in acute distress.     Appearance: She is ill-appearing. She is not toxic-appearing.   HENT:      Head: Normocephalic and atraumatic.      Mouth/Throat:       Mouth: Mucous membranes are dry.      Pharynx: Oropharynx is clear.   Eyes:      General: No scleral icterus.     Conjunctiva/sclera: Conjunctivae normal.   Cardiovascular:      Rate and Rhythm: Normal rate. Rhythm irregular.      Pulses: Normal pulses.      Heart sounds: No murmur heard.  Pulmonary:      Effort: Pulmonary effort is normal.   Abdominal:      General: There is no distension.      Palpations: Abdomen is soft.      Tenderness: There is no abdominal tenderness.   Musculoskeletal:      Right lower leg: No edema.      Left lower leg: No edema.      Comments: Bilateral AKA   Skin:     General: Skin is warm and dry.   Neurological:      Mental Status: She is alert. She is disoriented.      Comments: Reflexes were intact   Psychiatric:         Behavior: Behavior is cooperative.          Significant Labs:  CBC:   Recent Labs   Lab 02/06/24  0347   WBC 5.56   RBC 3.78*   HGB 11.6*   HCT 36.9*      MCV 98   MCH 30.7   MCHC 31.4*     CMP:   Recent Labs   Lab 02/06/24  0347      CALCIUM 8.7   ALBUMIN 2.7*   PROT 7.5      K 4.0   CO2 24   CL 98   BUN 46*   CREATININE 6.3*   ALKPHOS 361*   ALT 17   AST 41*   BILITOT 0.7     All labs within the past 24 hours have been reviewed.

## 2024-02-06 NOTE — CONSULTS
"CONSULTATION LIAISON PSYCHIATRY INITIAL EVALUATION    Patient Name: Suyapa Connelly  MRN: [id number]  Patient Class: IP- Inpatient  Admission Date: 2/4/2024  Attending Physician: Sumanth Christina MD      HPI:   Suyapa Connelly is a 58 y.o. 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    Psychiatry consulted for "hallucinations/crying"    On psych exam, pt sitting up in bed watching TV. She is alert to person and place only. States the year is 2001 and the month is December. Upon re-orientation to correct month and year pt becomes tearful. She states "I don't know why I am like this but I don't want to be." She denies any psychiatric history but also states "I'm not sure you will have to ask my ." She explains she has felt sad for a "really long time" but has just been "holding it in." She also states the recent confusion she has been experiencing has been quite stressful for her. She states "I used to be able to do everything for myself and now I can't." She endorses crying often at home and while she is been in the hospital. She denies hearing voices or seeing things that other people cannot hear or see. However, per primary care team  has stated otherwise. Attempted to contact  for more information unsuccessfully. Will re-attempt.    Although tearful, pt calm and cooperative during interview. At times she has difficult with word-finding and cannot complete her thought. She is a poor historian and states "ask my " to several questions. She denies thoughts of wanting to harm herself and states she has never felt like that before.     Per chart review pt recently started on zoloft 100mg daily and vistaril 25mg TID in December 2023. She was also seen by our service in " June 2021 with similar presentation.      Collateral with patient's permission:   Attempted to call pt's Spouse Randell @[phone]; call forwarded to voicemail. Will re-attempt to contact.    Medical Review of Systems:  Pertinent items are noted in HPI.    Psychiatric Review of Systems (is patient experiencing or having changes in):  sleep: no  appetite: no  weight: no  energy/anergy: no  interest/pleasure/anhedonia: yes  somatic symptoms: no  libido: no  anxiety/panic: no  guilty/hopelessness: yes  concentration: yes  Anel:no  Psychosis: no  Trauma: no  S.I.B.s/risky behavior: no    Past Psychiatric History:  Previous Medication Trials: yes, Seroquel and trazodone for sleep   Previous Psychiatric Hospitalizations:no   Previous Suicide Attempts: no  History of Violence: no  Outpatient Psychiatrist: no  Family Psychiatric History: no    Substance Abuse History (with emphasis over the last 12 months):  Recreational Drugs: marijuana  Use of Alcohol: denied  Tobacco Use:no  Rehab History:no    Social History:  Marital Status:   Children: 1  Employment Status/Info: on disability  :no  Education: some college  Special Ed: no  Housing Status: with spouse  Access to gun: no  Psychosocial Stressors: health  Functioning Relationships: good support system    Legal History:  Past Charges/Incarcerations: no  Pending charges:no    Mental Status Exam:  General Appearance: appears stated age, well developed and nourished, adequately groomed and appropriately dressed, in no acute distress  Behavior: normal; cooperative; reasonably friendly, pleasant, and polite; appropriate eye-contact; under good behavioral control  Involuntary Movements and Motor Activity: no abnormal involuntary movements noted; no tics, no tremors, no akathisia, no dystonia, no evidence of tardive dyskinesia; no psychomotor agitation or retardation  Gait and Station: unable to assess - patient lying down or seated  Speech and Language: normal rate,  "rhythm, volume, tone, and pitch, +word-finding difficulties noted  Mood: "sad"  Affect: flat, tearful  Thought Process and Associations: intact; linear, goal-directed, organized, and logical; no loosening of associations noted  Thought Content and Perceptions:: no suicidal ideation, no homicidal ideation, pt denies AVH, however  states otherwise  Sensorium and Orientation: oriented to person and place  Recent and Remote Memory: impaired  Attention and Concentration: unable to spell WORLD backwards  Fund of Knowledge: impaired, inconsistent with educational level achieved  Insight: limited/partial awareness of illness  Judgment: intact, behavior is adequate/appropriate to the circumstances, compliant with health provider's recommendations and instructions    CAM ICU positive? yes      ASSESSMENT & RECOMMENDATIONS   h/o adjustment disorder with mixed anxiety and depressed mood  H/o CAROLIN  H/o insomnia   R/o delirium  R/o encephalopathy       Adjustment disorder  Resume pts home medication: 100mg zoloft Qday  DELIRIUM  DELIRIUM BEHAVIOR MANAGEMENT  PLEASE utilize CHEMICAL restraints with PRN meds first for agitation. Minimize use of PHYSICAL restraints OR have periods of being out of physical restraints if possible.  Keep window shades open and room lit during day and room dim at night in order to promote normal sleep-wake cycles  Encourage family at bedside. [place] patient often to situation, location, date.  Continue to Limit or Discontinue use of Narcotics, Benzos and Anti-cholinergic medications as they may worsen delirium.  Continue medical workup for causative etiology of Delirium.   Can use Zyprexa 2.5mg PO Q6 for any agitation or insomnia at night   Monitor Qtc    RISK ASSESSMENT  NO NEED FOR PEC patient NOT in any imminent danger of hurting self or others and not gravely disabled.     FOLLOW UP  Will follow up while in house    DISPOSITION - once medically cleared:    Defer to medical team    Please " contact ON CALL psychiatry service (24/7) for any acute issues that may arise.  Harinder Enciso PMHNP   Psychiatry  [facility]  2/6/2024 11:13 AM        --------------------------------------------------------------------------------------------------------------------------------------------------------------------------------------------------------------------------------------    CONTINUED HISTORY & OBJECTIVE clinical data & findings reviewed and noted for above decision making    Past Medical/Surgical History:   Past Medical History:   Diagnosis Date    Anxiety     Chronic pain syndrome     CKD (chronic kidney disease), stage III     Depression     Diabetes mellitus     type 2    Diabetes mellitus, type 2     GERD (gastroesophageal reflux disease)     Hyperemesis 3/23/2021    Hypokalemia 3/23/2021    Infection of below knee amputation stump 3/12/2022    Osteomyelitis     Osteomyelitis of left foot 4/30/2021    Ulcer of left foot     Vaginal delivery     x1     Past Surgical History:   Procedure Laterality Date    ABOVE-KNEE AMPUTATION Left 5/18/2021    Procedure: AMPUTATION, ABOVE KNEE;  Surgeon: Teddy Huber MD;  Location: [facility] OR [address] [place] [address];  Service: Vascular;  Laterality: Left;    ABOVE-KNEE AMPUTATION Right 3/18/2022    Procedure: AMPUTATION, ABOVE KNEE;  Surgeon: DAYNE Florez II, MD;  Location: [facility] OR [address] [place] [address];  Service: Vascular;  Laterality: Right;    Angiogram - Right Extremity Right 7/9/15    angiogram-left leg  10/6/15    ANGIOGRAPHY OF LOWER EXTREMITY Left 4/29/2021    Procedure: ANGIOGRAM, LOWER EXTREMITY;  Surgeon: Teddy Huber MD;  Location: [facility] OR [address] [place] [address];  Service: Vascular;  Laterality: Left;    BELOW KNEE AMPUTATION OF LOWER EXTREMITY Right 12/28/2021    Procedure: AMPUTATION, BELOW KNEE;  Surgeon: Kaitlyn Rojas MD;  Location: [facility];  Service: General;  Laterality: Right;    CATHETERIZATION OF BOTH LEFT AND RIGHT HEART N/A 12/18/2019     Procedure: CATHETERIZATION, HEART, BOTH LEFT AND RIGHT;  Surgeon: Que Fernando III, MD;  Location: [facility] CATH LAB;  Service: Cardiology;  Laterality: N/A;    CORONARY ANGIOGRAPHY N/A 12/18/2019    Procedure: ANGIOGRAM, CORONARY ARTERY;  Surgeon: Que Fernando III, MD;  Location: [facility] CATH LAB;  Service: Cardiology;  Laterality: N/A;    CORONARY ANGIOGRAPHY INCLUDING BYPASS GRAFTS WITH CATHETERIZATION OF LEFT HEART N/A 7/28/2020    Procedure: ANGIOGRAM, CORONARY, INCLUDING BYPASS GRAFT, WITH LEFT HEART CATHETERIZATION, 9 am;  Surgeon: Rachel Easley MD;  Location: [facility] CATH LAB;  Service: Cardiology;  Laterality: N/A;    CORONARY ARTERY BYPASS GRAFT (CABG) N/A 1/14/2020    Procedure: CORONARY ARTERY BYPASS GRAFT (CABG) x 1     Off Pump;  Surgeon: Huang Altamirano MD;  Location: [facility] OR [address] [place] [address];  Service: Cardiovascular;  Laterality: N/A;    CREATION OF FEMORAL-TIBIAL ARTERY BYPASS Left 4/29/2021    Procedure: CREATION, BYPASS, ARTERIAL, FEMORAL TO ANTERIOR TIBIAL;  Surgeon: Teddy Huber MD;  Location: [facility] OR [address] [place] [address];  Service: Vascular;  Laterality: Left;    CREATION OF FEMOROPOPLITEAL ARTERIAL BYPASS USING GRAFT Left 8/18/2020    Procedure: CREATION, BYPASS, ARTERIAL, FEMORAL TO POPLITEAL, USING GRAFT, LEFT LOWER EXTREMITY;  Surgeon: Teddy Huber MD;  Location: [facility];  Service: Vascular;  Laterality: Left;  REQUEST 7:15 A.M. START----COVID NEGATIVE ON 8/17  1ST CASE JASWANT PER LEANA ON 8/7/2020 @ 942AM-LO  RN PREOP 8/12/2020   T/S-----CLEARED BY CARDS-------PENDING INSURANCE    DEBRIDEMENT OF FOOT Left 9/8/2020    Procedure: DEBRIDEMENT, FOOT;  Surgeon: Rosio Mayes DPM;  Location: [facility] OR;  Service: Podiatry;  Laterality: Left;  request neoxx .   RN Pre Op 9-4-2020, Covid negative on 9/5/20. C A    DEBRIDEMENT OF FOOT  3/4/2021    Procedure: DEBRIDEMENT, FOOT;  Surgeon: Teddy Huber MD;  Location: [facility] OR;  Service: Vascular;;    DEBRIDEMENT OF FOOT Left 3/9/2021     Procedure: DEBRIDEMENT, FOOT, bone biopsy;  Surgeon: Rosio Mayes DPM;  Location: [facility] OR;  Service: Podiatry;  Laterality: Left;  Request neoxx---COVID IN AM  REQUESTING NOON START  RN Phone Pre op.On Blood thinners Plavix and Eliquis.  Covid am of surgery. C A    DEBRIDEMENT OF FOOT Left 5/4/2021    Procedure: DEBRIDEMENT, FOOT;  Surgeon: Farooq Morley DPM;  Location: [facility] OR 2ND FLR;  Service: Podiatry;  Laterality: Left;    INSERTION OF TUNNELED CENTRAL VENOUS HEMODIALYSIS CATHETER N/A 1/27/2020    Procedure: Insertion, Catheter, Central Venous, Hemodialysis;  Surgeon: ESTEBAN Gomez III, MD;  Location: [facility] CATH LAB;  Service: Peripheral Vascular;  Laterality: N/A;    INSERTION OF TUNNELED CENTRAL VENOUS HEMODIALYSIS CATHETER  5/10/2023    Procedure: Insertion, Catheter, Central Venous, Hemodialysis;  Surgeon: Romulo Queen MD;  Location: [facility] CATH LAB;  Service: Cardiology;;    PERCUTANEOUS TRANSLUMINAL ANGIOPLASTY N/A 3/4/2021    Procedure: PTA (ANGIOPLASTY, PERCUTANEOUS, TRANSLUMINAL);  Surgeon: Teddy Huber MD;  Location: [facility] OR;  Service: Vascular;  Laterality: N/A;    REMOVAL OF ARTERIOVENOUS GRAFT Left 5/27/2021    Procedure: REMOVAL, GRAFT, LEFT LOWER EXTREMITY, WOUND EXPLORATION;  Surgeon: Teddy Huber MD;  [address];  Service: Vascular;  Laterality: Left;    REMOVAL OF NAIL OF DIGIT Left 3/9/2021    Procedure: REMOVAL, NAIL, DIGIT;  Surgeon: Rosio Mayes DPM;  Location: [facility] OR;  Service: Podiatry;  Laterality: Left;    RIGHT HEART CATHETERIZATION Right 5/10/2023    Procedure: INSERTION, CATHETER, RIGHT HEART;  Surgeon: Romulo Queen MD;  Location: [facility] CATH LAB;  Service: Cardiology;  Laterality: Right;    THROMBECTOMY Left 3/4/2021    Procedure: THROMBECTOMY, LEFT LOWER EXTREMITY BYPASS GRAFT, ANGIOGRAM, POSSIBLE INTERVENTION, POSSIBLE LEFT LOWER EXTREMITY BYPASS;  Surgeon: Teddy Huber MD;  Location: [facility] OR;  Service: Vascular;   Laterality: Left;    THROMBECTOMY Left 4/29/2021    Procedure: GRAFT THROMBECTOMY, LEFT LOWER EXTREMITY;  Surgeon: Teddy Huber MD;  Location: [facility] OR [address] [place] [address];  Service: Vascular;  Laterality: Left;  14.5 min  1179.85 mGy  341.01 Gycm2  240 ml dye    THROMBECTOMY  10/22/2021    Procedure: THROMBECTOMY;  Surgeon: Saad Arenas MD;  Location: [facility] CATH LAB/EP;  Service: Cardiology;;       Current Medications:   Scheduled Meds:    sodium chloride 0.9%   Intravenous Once    apixaban  5 mg Oral BID    atorvastatin  80 mg Oral Daily    clopidogreL  75 mg Oral Daily    insulin detemir U-100 (Levemir)  2 Units Subcutaneous QHS    sodium bicarbonate  650 mg Oral TID     PRN Meds: dextrose 10%, dextrose 10%, dextrose 10%, dextrose 10%, glucagon (human recombinant), glucagon (human recombinant), glucose, glucose, insulin aspart U-100, naloxone, sodium chloride 0.9%    Allergies:   Review of patient's allergies indicates:   Allergen Reactions    Ciprofloxacin Itching    Contrast media      Kidney injury    Iodine      Kidney injury    Pcn [penicillins]      Rash; tolerated ceftriaxone on 1/13/20       Vitals  Vitals:    02/06/24 1109   BP: (!) 175/92   Pulse: 89   Resp: 18   Temp: 98.3 °F (36.8 °C)       Labs/Imaging/Studies:  Recent Results (from the past 24 hour(s))   POCT glucose    Collection Time: 02/05/24  4:35 PM   Result Value Ref Range    POCT Glucose 67 (L) 70 - 110 mg/dL   POCT glucose    Collection Time: 02/05/24  9:27 PM   Result Value Ref Range    POCT Glucose 119 (H) 70 - 110 mg/dL   Comprehensive Metabolic Panel (CMP)    Collection Time: 02/06/24  3:47 AM   Result Value Ref Range    Sodium 136 136 - 145 mmol/L    Potassium 4.0 3.5 - 5.1 mmol/L    Chloride 98 95 - 110 mmol/L    CO2 24 23 - 29 mmol/L    Glucose 101 70 - 110 mg/dL    BUN 46 (H) 6 - 20 mg/dL    Creatinine 6.3 (H) 0.5 - 1.4 mg/dL    Calcium 8.7 8.7 - 10.5 mg/dL    Total Protein 7.5 6.0 - 8.4 g/dL    Albumin 2.7 (L) 3.5 - 5.2 g/dL    Total  Bilirubin 0.7 0.1 - 1.0 mg/dL    Alkaline Phosphatase 361 (H) 55 - 135 U/L    AST 41 (H) 10 - 40 U/L    ALT 17 10 - 44 U/L    eGFR 7.2 (A) >60 mL/min/1.73 m^2    Anion Gap 14 8 - 16 mmol/L   Magnesium    Collection Time: 02/06/24  3:47 AM   Result Value Ref Range    Magnesium 2.2 1.6 - 2.6 mg/dL   Phosphorus    Collection Time: 02/06/24  3:47 AM   Result Value Ref Range    Phosphorus 5.4 (H) 2.7 - 4.5 mg/dL   CBC with Automated Differential    Collection Time: 02/06/24  3:47 AM   Result Value Ref Range    WBC 5.56 3.90 - 12.70 K/uL    RBC 3.78 (L) 4.00 - 5.40 M/uL    Hemoglobin 11.6 (L) 12.0 - 16.0 g/dL    Hematocrit 36.9 (L) 37.0 - 48.5 %    MCV 98 82 - 98 fL    MCH 30.7 27.0 - 31.0 pg    MCHC 31.4 (L) 32.0 - 36.0 g/dL    RDW 16.6 (H) 11.5 - 14.5 %    Platelets 189 150 - 450 K/uL    MPV 11.4 9.2 - 12.9 fL    Immature Granulocytes 0.2 0.0 - 0.5 %    Gran # (ANC) 3.4 1.8 - 7.7 K/uL    Immature Grans (Abs) 0.01 0.00 - 0.04 K/uL    Lymph # 1.1 1.0 - 4.8 K/uL    Mono # 0.7 0.3 - 1.0 K/uL    Eos # 0.3 0.0 - 0.5 K/uL    Baso # 0.07 0.00 - 0.20 K/uL    nRBC 0 0 /100 WBC    Gran % 60.6 38.0 - 73.0 %    Lymph % 20.1 18.0 - 48.0 %    Mono % 13.1 4.0 - 15.0 %    Eosinophil % 4.7 0.0 - 8.0 %    Basophil % 1.3 0.0 - 1.9 %    Differential Method Automated    POCT glucose    Collection Time: 02/06/24  5:37 AM   Result Value Ref Range    POCT Glucose 111 (H) 70 - 110 mg/dL     Imaging Results               MRI Brain Without Contrast (Final result)  Result time 02/05/24 04:54:40      Final result by Lino Peter MD (02/05/24 04:54:40)                   Impression:      Minimal punctate increased diffusion signal in the bilateral corona radiata, potentially related to tiny recent infarcts or T2 shine through.  Suggest correlation with symptoms in this patient with mental status change.    Extensive periventricular white matter hyperintense signal, nonspecific, though likely related to chronic microvascular ischemic change.   Findings are similar to slightly worse when compared with the prior MRI in 2022.    Similar prominent ventricles.    This report was flagged in Epic as abnormal.      Electronically signed by: Lino Peter MD  Date:    02/05/2024  Time:    04:54               Narrative:    EXAMINATION:  MRI BRAIN WITHOUT CONTRAST    CLINICAL HISTORY:  Mental status change, unknown cause;    TECHNIQUE:  Multiplanar multisequence MR imaging of the brain was performed without contrast.    COMPARISON:  MRI, 08/11/2022.  CT head, 02/04/2024.    FINDINGS:  Exam quality is limited by motion.    Punctate increased diffusion signal in the bilateral corona radiata, potentially T2 shine through or punctate recent infarcts.  No additional abnormal diffusion restriction.    FLAIR imaging demonstrates extensive patchy and confluent periventricular and subcortical white matter signal hyperintensity, suggestive chronic microvascular ischemic change, noting similar to mildly worse white matter signal abnormalities when compared with prior MRI.    Ventricles are prominent, with similar size when compared with prior CT and prior MRI.  Generalized cerebral volume loss.    No evidence of mass lesion, hemorrhage, edema or recent or remote major vascular distribution infarction.  Punctate low GRE signal in the white matter adjacent to the posterior horn of the right lateral ventricle, likely from remote microhemorrhage.  Similar appearance of pericallosal lipoma.    No extra-axial blood or fluid collections.    T2 skull base flow voids are preserved. Bone marrow signal intensity is unremarkable.                                       X-Ray Chest AP Portable (Final result)  Result time 02/04/24 17:25:25      Final result by Agustin Barba MD (02/04/24 17:25:25)                   Impression:      As above      Electronically signed by: Agustin Barba MD  Date:    02/04/2024  Time:    17:25               Narrative:    EXAMINATION:  XR CHEST AP  PORTABLE    CLINICAL HISTORY:  Sepsis;    TECHNIQUE:  Single frontal view of the chest was performed.    COMPARISON:  12/02/2023    FINDINGS:  The cardiomediastinal silhouette is prominent, stable noting calcification of the aorta.  Left central venous catheter tip projects over the distal SVC..  There is no pleural effusion.  The trachea is midline.  The lungs are symmetrically expanded bilaterally with coarse interstitial attenuation bilaterally suggesting edema..  No new large focal consolidation seen.  There is no pneumothorax.  The osseous structures are unchanged..                                       CT Head Without Contrast (Final result)  Result time 02/04/24 16:44:37      Final result by Agustin Barba MD (02/04/24 16:44:37)                   Impression:      1. Allowing for extensive motion artifact, no convincing acute intracranial abnormalities noting sequela of chronic microvascular ischemic change and senescent change.  2. Stable ventricular prominence, correlation with any history of normal pressure hydrocephalus.      Electronically signed by: Agustin Barba MD  Date:    02/04/2024  Time:    16:44               Narrative:    EXAMINATION:  CT HEAD WITHOUT CONTRAST    CLINICAL HISTORY:  Mental status change, unknown cause;unresponsive;    TECHNIQUE:  Low dose axial images were obtained through the head.  Coronal and sagittal reformations were also performed. Contrast was not administered.    COMPARISON:  11/20/2023    FINDINGS:  There is extensive motion artifact.    There is generalized cerebral volume loss.  There is hypoattenuation in a periventricular fashion, likely sequela of chronic microvascular ischemic change.  There is no evidence of acute major vascular territory infarct, hemorrhage, or mass.  There is stable ventricular prominence.  There are no abnormal extra-axial fluid collections.  The paranasal sinuses and mastoid air cells are clear, and there is no evidence of calvarial  fracture.  The visualized soft tissues are unremarkable.

## 2024-02-06 NOTE — PT/OT/SLP EVAL
"Physical Therapy Evaluation/co eval with OT    Patient Name:  Suyapa Connelly   MRN:  [id number]    Recommendations:     Discharge Recommendations: Moderate Intensity Therapy   Discharge Equipment Recommendations: none   Barriers to discharge: Decreased caregiver support requires increased assist for mobility    Assessment:     Suyapa Connelly is a 58 y.o. female admitted with a medical diagnosis of Encephalopathy.  She presents with the following impairments/functional limitations: weakness, impaired self care skills, impaired functional mobility, impaired cognition, decreased lower extremity function, decreased upper extremity function, decreased safety awareness, impaired cardiopulmonary response to activity ./Pt is unsafe with functional mobility at this time due to pt requires minimal to moderate assist for bed mobility and CGA to moderate assist for sitting balance due to posterior and L sided lean. Pt is motivated to progress with functional mobility.     Rehab Prognosis: Good to return to prior level of function; patient would benefit from acute skilled PT services to address these deficits and reach maximum level of function.    Recent Surgery: * No surgery found *      Plan:     During this hospitalization, patient to be seen 4 x/week to address the identified rehab impairments via therapeutic activities, therapeutic exercises, wheelchair management/training and progress toward the following goals:    Plan of Care Expires:  03/07/24    Subjective   "I am not hurting"  Pain/Comfort:  Pain Rating 1: 0/10 (pt stated "no" when asked if she was in pain)  Pain Rating Post-Intervention 1: 0/10    Patients cultural, spiritual, Quaker conflicts given the current situation: no    Living Environment:  Pt lives with her  in a 1 story home with no NASIR  Prior to admission, patients level of function was independent with transfers to the w/c, commode, and tub bench per her . Pt would push herself " around in the w./c in their home.  Equipment used at home: wheelchair, bath bench, grab bar (pt's  states she needs a new w/c due to the brakes don't lock; pt had jany lift sling in her room).  Upon discharge, patient will have assistance from .    Objective:     Communicated with nurse prior to session.  Patient found supine with telemetry  upon PT entry to room.    General Precautions: Standard, fall  Orthopedic Precautions: (B AKA)   Braces: N/A  Respiratory Status: Room air    Exams:  Cognitive Exam:  Patient is oriented to Person and Place  Sensation:    -       Intact  light/touch B residual LEs  RLE ROM: Deficits: limited hip abd/add and hip ext  RLE Strength: Deficits: limited active movement in hip into hip flex ; minimal hip add/add and hip ext noted with function  LLE ROM: Deficits: limited hip abd/add and hip ext  LLE Strength: : Deficits: limited active movement in hip into hip flex ; minimal hip add/add and hip ext noted with function    Functional Mobility:  Bed Mobility:     Rolling Left:  contact guard assistance  Rolling Right: moderate assistance  Supine to Sit: minimum assistance  Sit to Supine: minimum assistance      AM-PAC 6 CLICK MOBILITY  Total Score:10     Treatment & Education:  Pt sat ~ 18 min in long sitting in the bed with CGA to minimal assist due to posterior and L sided instability noted. Pt able to maintain sitting with no UE support for ~ 45 sec prior to LOB. Pt assisted with cleaning her upper body and trunk in sitting and gown was changed. Pt noted to have had a BM, pt was assisted to roll to L and R to be cleaned and linens changed. Nurse notified.   Co-treat with OT due to medical complexity of pt and need for skilled hands for safe intervention.   Patient left HOB elevated with all lines intact, call button in reach, bed alarm on, and nurse notified.    GOALS:   Multidisciplinary Problems       Physical Therapy Goals          Problem: Physical Therapy    Goal  Priority Disciplines Outcome Goal Variances Interventions   Physical Therapy Goal     PT, PT/OT Ongoing, Progressing     Description: PT goals until 2/10/24    1. Pt supine to sit with SBA-not met  2. Pt sit to supine with SBA-not met  3. Pt to transfer bed to/from w/c with or without slide board with minimal assist-not met  4. Pt to propel w/c 50ft on level surface with B UE with supervision.-not met  5. Pt to perform B LE exs in  supine x 10 reps to strengthen B LE to improve functional mobility.-not met                        History:     Past Medical History:   Diagnosis Date    Anxiety     Chronic pain syndrome     CKD (chronic kidney disease), stage III     Depression     Diabetes mellitus     type 2    Diabetes mellitus, type 2     GERD (gastroesophageal reflux disease)     Hyperemesis 3/23/2021    Hypokalemia 3/23/2021    Infection of below knee amputation stump 3/12/2022    Osteomyelitis     Osteomyelitis of left foot 4/30/2021    Ulcer of left foot     Vaginal delivery     x1       Past Surgical History:   Procedure Laterality Date    ABOVE-KNEE AMPUTATION Left 5/18/2021    Procedure: AMPUTATION, ABOVE KNEE;  Surgeon: Teddy Huber MD;  Location: [address];  Service: Vascular;  Laterality: Left;    ABOVE-KNEE AMPUTATION Right 3/18/2022    Procedure: AMPUTATION, ABOVE KNEE;  Surgeon: DAYNE Florez II, MD;  Location: [address];  Service: Vascular;  Laterality: Right;    Angiogram - Right Extremity Right 7/9/15    angiogram-left leg  10/6/15    ANGIOGRAPHY OF LOWER EXTREMITY Left 4/29/2021    Procedure: ANGIOGRAM, LOWER EXTREMITY;  Surgeon: Teddy Huber MD;  Location: [address];  Service: Vascular;  Laterality: Left;    BELOW KNEE AMPUTATION OF LOWER EXTREMITY Right 12/28/2021    Procedure: AMPUTATION, BELOW KNEE;  Surgeon: Kaitlyn Rojas MD;  Location: [facility] OR;  Service: General;  Laterality: Right;    CATHETERIZATION OF BOTH LEFT AND RIGHT HEART N/A 12/18/2019     Procedure: CATHETERIZATION, HEART, BOTH LEFT AND RIGHT;  Surgeon: Que Fernando III, MD;  Location: [facility] CATH LAB;  Service: Cardiology;  Laterality: N/A;    CORONARY ANGIOGRAPHY N/A 12/18/2019    Procedure: ANGIOGRAM, CORONARY ARTERY;  Surgeon: Que Fernando III, MD;  Location: [facility] CATH LAB;  Service: Cardiology;  Laterality: N/A;    CORONARY ANGIOGRAPHY INCLUDING BYPASS GRAFTS WITH CATHETERIZATION OF LEFT HEART N/A 7/28/2020    Procedure: ANGIOGRAM, CORONARY, INCLUDING BYPASS GRAFT, WITH LEFT HEART CATHETERIZATION, 9 am;  Surgeon: Rachel Easley MD;  Location: [facility] CATH LAB;  Service: Cardiology;  Laterality: N/A;    CORONARY ARTERY BYPASS GRAFT (CABG) N/A 1/14/2020    Procedure: CORONARY ARTERY BYPASS GRAFT (CABG) x 1     Off Pump;  Surgeon: Huagn Altamirano MD;  Location: [facility] OR [address] [place] [address];  Service: Cardiovascular;  Laterality: N/A;    CREATION OF FEMORAL-TIBIAL ARTERY BYPASS Left 4/29/2021    Procedure: CREATION, BYPASS, ARTERIAL, FEMORAL TO ANTERIOR TIBIAL;  Surgeon: Teddy Huber MD;  Location: [facility] OR [address] [place] [address];  Service: Vascular;  Laterality: Left;    CREATION OF FEMOROPOPLITEAL ARTERIAL BYPASS USING GRAFT Left 8/18/2020    Procedure: CREATION, BYPASS, ARTERIAL, FEMORAL TO POPLITEAL, USING GRAFT, LEFT LOWER EXTREMITY;  Surgeon: Teddy Huber MD;  Location: [facility];  Service: Vascular;  Laterality: Left;  REQUEST 7:15 A.M. START----COVID NEGATIVE ON 8/17  1ST CASE JASWANT PER LEANA ON 8/7/2020 @ 942AM-LO  RN PREOP 8/12/2020   T/S-----CLEARED BY CARDS-------PENDING INSURANCE    DEBRIDEMENT OF FOOT Left 9/8/2020    Procedure: DEBRIDEMENT, FOOT;  Surgeon: Rosio Mayes DPM;  Location: [facility] OR;  Service: Podiatry;  Laterality: Left;  request neoxx .   RN Pre Op 9-4-2020, Covid negative on 9/5/20. C A    DEBRIDEMENT OF FOOT  3/4/2021    Procedure: DEBRIDEMENT, FOOT;  Surgeon: Teddy Huber MD;  Location: [facility] OR;  Service: Vascular;;    DEBRIDEMENT OF FOOT Left 3/9/2021     Procedure: DEBRIDEMENT, FOOT, bone biopsy;  Surgeon: Rosio Mayes DPM;  Location: [facility] OR;  Service: Podiatry;  Laterality: Left;  Request neoxx---COVID IN AM  REQUESTING NOON START  RN Phone Pre op.On Blood thinners Plavix and Eliquis.  Covid am of surgery. C A    DEBRIDEMENT OF FOOT Left 5/4/2021    Procedure: DEBRIDEMENT, FOOT;  Surgeon: Farooq Morley DPM;  Location: [facility] OR 2ND FLR;  Service: Podiatry;  Laterality: Left;    INSERTION OF TUNNELED CENTRAL VENOUS HEMODIALYSIS CATHETER N/A 1/27/2020    Procedure: Insertion, Catheter, Central Venous, Hemodialysis;  Surgeon: ESTEBAN Gomez III, MD;  Location: [facility] CATH LAB;  Service: Peripheral Vascular;  Laterality: N/A;    INSERTION OF TUNNELED CENTRAL VENOUS HEMODIALYSIS CATHETER  5/10/2023    Procedure: Insertion, Catheter, Central Venous, Hemodialysis;  Surgeon: Romulo Queen MD;  Location: [facility] CATH LAB;  Service: Cardiology;;    PERCUTANEOUS TRANSLUMINAL ANGIOPLASTY N/A 3/4/2021    Procedure: PTA (ANGIOPLASTY, PERCUTANEOUS, TRANSLUMINAL);  Surgeon: Teddy Huber MD;  Location: [facility] OR;  Service: Vascular;  Laterality: N/A;    REMOVAL OF ARTERIOVENOUS GRAFT Left 5/27/2021    Procedure: REMOVAL, GRAFT, LEFT LOWER EXTREMITY, WOUND EXPLORATION;  Surgeon: Teddy Huber MD;  [address];  Service: Vascular;  Laterality: Left;    REMOVAL OF NAIL OF DIGIT Left 3/9/2021    Procedure: REMOVAL, NAIL, DIGIT;  Surgeon: Rosio Mayes DPM;  Location: [facility] OR;  Service: Podiatry;  Laterality: Left;    RIGHT HEART CATHETERIZATION Right 5/10/2023    Procedure: INSERTION, CATHETER, RIGHT HEART;  Surgeon: Romulo Queen MD;  Location: [facility] CATH LAB;  Service: Cardiology;  Laterality: Right;    THROMBECTOMY Left 3/4/2021    Procedure: THROMBECTOMY, LEFT LOWER EXTREMITY BYPASS GRAFT, ANGIOGRAM, POSSIBLE INTERVENTION, POSSIBLE LEFT LOWER EXTREMITY BYPASS;  Surgeon: Teddy Huber MD;  Location: [facility] OR;  Service: Vascular;   Laterality: Left;    THROMBECTOMY Left 4/29/2021    Procedure: GRAFT THROMBECTOMY, LEFT LOWER EXTREMITY;  Surgeon: Teddy Hbuer MD;  Location: [facility] OR [address] [place] [address];  Service: Vascular;  Laterality: Left;  14.5 min  1179.85 mGy  341.01 Gycm2  240 ml dye    THROMBECTOMY  10/22/2021    Procedure: THROMBECTOMY;  Surgeon: Saad Arenas MD;  Location: [facility] CATH LAB/EP;  Service: Cardiology;;       Time Tracking:     PT Received On: 02/06/24  PT Start Time: 1017     PT Stop Time: 1045  PT Total Time (min): 28 min     Billable Minutes: Evaluation 10 and Therapeutic Activity 18      02/06/2024

## 2024-02-06 NOTE — PLAN OF CARE
Problem: Occupational Therapy  Goal: Occupational Therapy Goal  Description: Goals to be met by: 03/06/2024     Patient will increase functional independence with ADLs by performing:    UE Dressing with Modified [place].  LE Dressing with Modified [place].  Grooming while seated with Set-up Assistance.  Toileting from bedside commode with Contact Guard Assistance for hygiene and clothing management.   Supine to sit with Modified [place].  Wheelchair transfer from bed with Contact Guard Assistance    Outcome: Ongoing, Progressing     OT eval complete & goals established.

## 2024-02-06 NOTE — PLAN OF CARE
Problem: Physical Therapy  Goal: Physical Therapy Goal  Description: PT goals until 2/10/24    1. Pt supine to sit with SBA-not met  2. Pt sit to supine with SBA-not met  3. Pt to transfer bed to/from w/c with or without slide board with minimal assist-not met  4. Pt to propel w/c 50ft on level surface with B UE with supervision.-not met  5. Pt to perform B LE exs in  supine x 10 reps to strengthen B LE to improve functional mobility.-not met   Outcome: Ongoing, Progressing   Pt's goals set and pt will benefit from skilled PT services to work towards improved functional mobility including: bed mobility, transfers, and w/c mobility. Jenna Gerardo PT  2/6/2024

## 2024-02-06 NOTE — PROGRESS NOTES
[facility] Medicine  Progress Note    Patient Name: Suyapa Connelly  MRN: [id number]  Patient Class: IP- Inpatient   Admission Date: 2/4/2024  Length of Stay: 0 days  Attending Physician: Sumanth Christina MD  Primary Care Provider: Magen Christensen MD        Subjective:     Principal Problem:Encephalopathy        HPI:  Patient is a 58-year-old female with complex past medical history significant for type 2 diabetes, ESRD with HD MWF, GERD, BKA following osteomyelitis of the left foot, hypertension who is presenting to the emergency room with a 24 hour history of altered mental status.  She is accompanied by her  who states that the patient woke up this am and was completely unresponsive to verbal cues this morning.  Day prior, the patient had been having visual hallucinations Claritin her  where she was trying to talk to people in the room who were not there.  Seemed as though that she was not having full conversations at the time.  The patient was able to take evening medications and then went to bed.  This morning the patient is completely unresponsive to stimuli and has been sleeping the entire day.  Occasionally she will open her eyes to stimuli and apparently she was able to squeeze her hand when asked by the emergency physician however I did not experience this.  There was no convulsive episode prior to this.  A CT of her head was negative.     states that there was 1 prior episode of this happening that was related to gabapentin overdose however she has not been taking any gabapentin in his instead using only pregabalin. She also had her quetiapine doubled approximately 1 month ago and was prescribed a new medication for sleep but is unclear as to which one.  In the emergency room vitals were relatively stable except for some mild hypertension.  A metabolic panel showed mild hyponatremia of 134, a BUN and creatinine of 37 and 5.4, alkaline phosphatase of 339,  magnesium 2.2, ammonia was 44, troponin was 0.047 and a BNP was elevated at 1024 although it is unclear what her baseline is given her ESRD.  A CBC was collected which showed some mild but at goal anemia of 11.7.  A CT head showed chronic microvascular changes but no acute changes and a chest x-ray was largely unremarkable outside of some interstitial infiltrates suggestive of early pulmonary edema which correlates with her BNP elevation..  Blood cultures were drawn but no empiric antibiotics were given.  A UA is pending but it is unclear if the patient makes urine at all.      Overview/Hospital Course:  Patient was monitored conservatively.  Patient's mental status had markedly improved.  EEG showed nonspecific slowing but no definitive epileptiform activity.  MRI not showing any concrete definitive new significant findings.    Interval History:  MRI findings discussed with stroke team, feel that this is more artifact than actual infarct.  Patient herself today denies any chest pain.  Blood pressure a bit labile.   at the bedside, you reports that she is intermittent terms of lucidity, reports she again when somewhat delirious today.     Review of Systems  Objective:     Vital Signs (Most Recent):  Temp: 98.3 °F (36.8 °C) (02/06/24 1109)  Pulse: 89 (02/06/24 1109)  Resp: 18 (02/06/24 1109)  BP: (!) 175/92 (02/06/24 1109)  SpO2: 95 % (02/06/24 1300) Vital Signs (24h Range):  Temp:  [97.8 °F (36.6 °C)-99.1 °F (37.3 °C)] 98.3 °F (36.8 °C)  Pulse:  [88-97] 89  Resp:  [13-19] 18  SpO2:  [89 %-100 %] 95 %  BP: (118-179)/(56-92) 175/92     Weight: 76.7 kg (169 lb 1.5 oz)  Body mass index is 51.6 kg/m².  No intake or output data in the 24 hours ending 02/06/24 1409      Physical Exam      Clear lungs bilaterally, unlabored breathing, sleeping, easily woken, no acute distress   Heart sounds indicate a regular rate and rhythm   No facial droop, no slurred  Abdomen nondistended   Following motor commands with bilateral  upper extremities, 5/5 fist    Oriented to self and place, partially to time        Assessment/Plan:      * Encephalopathy  Improving but not back to baseline yet per ; tolerating po now  Given patient's reported emotional lability of crying as well as hallucinations per the 's experience, we will consult Psychiatry   -EEG unremarkable for any definitive epileptiform activity, showing nonspecific slowing   -MRI findings discussed with stroke team, no definitive infarct noted, no acute significant findings          ESRD (end stage renal disease)  HD on MWF.    Debility  PT/T    S/P AKA (above knee amputation) bilateral  Fall precautions      Chronic combined systolic and diastolic heart failure  Ordering lifevest given EF 15% on latest echo  - HD,    - carvedilol   - hydralazine/isosorbide dinitrate restarted  - Fluid restriction to 1.5L    S/P femoral-popliteal bypass surgery  Continue clopidogrel and apixaban      Paroxysmal atrial fibrillation  Restarted home Eliquis        Anemia due to chronic kidney disease, on chronic dialysis  Stable         S/P CABG x 1  [facility] in 2020 prior to CABG:  Successful IVUS guided PCI of mid circumflex 80-85% stenosis with drug-eluting stent x1.  A 3.5 into 26 mm drug-eluting stent was implanted with excellent angiographic results and with IVUS.  SUSIE 3 flow post PCI     aspirin Plavix 75       Type 2 diabetes mellitus with hyperglycemia, with long-term current use of insulin  Current correctional scale  LDSS and levemir          VTE Risk Mitigation (From admission, onward)           Ordered     apixaban tablet 5 mg  2 times daily         02/05/24 1721     IP VTE HIGH RISK PATIENT  Once         02/04/24 2056     Place sequential compression device  Until discontinued         02/04/24 2056     Reason for No Pharmacological VTE Prophylaxis  Once        Question:  Reasons:  Answer:  Already adequately anticoagulated on oral Anticoagulants    02/04/24 2056                     Discharge Planning   DEVAN: 2/6/2024     Code Status: Full Code   Is the patient medically ready for discharge?:     Reason for patient still in hospital (select all that apply): Patient trending condition, Treatment, Consult recommendations, and PT / OT recommendations  Discharge Plan A: Home with family   Discharge Delays: None known at this time              Sumanth Christina MD  Department of Hospital Medicine   [facility] Surg

## 2024-02-06 NOTE — ASSESSMENT & PLAN NOTE
Nephrology History  iHD Schedule: [facility]   Unit/MD: TARYN  Duration: 3 hours   UF: 3-4  EDW: 76 kg   Access: [facility] TDC   Residual Renal Function: oliguric   Last HD prior to presentation: 2/3/24    Assessment:   -  HD today 2/6 for metabolic clearance   -  Continue home phos binders  -  Dialysate adjusted to current labs   -  Will obtain OP dialysis records  -  Continue to monitor intake and output, daily weights   -  Avoid nephrotoxic medication and renal dose medications to GFR

## 2024-02-06 NOTE — PLAN OF CARE
APPOINTMENT:    Patient Appointment(s) scheduled with Haydee Moore MD  Friday Feb 9, 2024 10:00 AM

## 2024-02-06 NOTE — PROGRESS NOTES
Patient arrived in a stretcher to dialysis unit.   Report received from Emerita Brown RN    VS's per dialysis Flowsheet.    ESRD on outpatient MWF schedule  Hemodialysis initiated using the following:    Dialysis Access: left TDC    Aspirated, flushed, and accessed using aseptic technique.    Will Maintain telemetry and blood pressure monitoring throughout treatment.  Refer to dialysis flowsheet and MAR for details.

## 2024-02-06 NOTE — PLAN OF CARE
[facility] rep will review notes on Pt's w/c and Sw will be sure spouse/Pt has the number to [facility] to be sure w/c issues are addressed.

## 2024-02-07 VITALS
OXYGEN SATURATION: 96 % | HEART RATE: 85 BPM | DIASTOLIC BLOOD PRESSURE: 75 MMHG | SYSTOLIC BLOOD PRESSURE: 175 MMHG | TEMPERATURE: 98 F | RESPIRATION RATE: 16 BRPM | BODY MASS INDEX: 39.12 KG/M2 | HEIGHT: 55 IN | WEIGHT: 169.06 LBS

## 2024-02-07 LAB
ALBUMIN SERPL BCP-MCNC: 2.6 G/DL (ref 3.5–5.2)
ALBUMIN SERPL BCP-MCNC: 2.7 G/DL (ref 3.5–5.2)
ALP SERPL-CCNC: 349 U/L (ref 55–135)
ALP SERPL-CCNC: 370 U/L (ref 55–135)
ALT SERPL W/O P-5'-P-CCNC: 18 U/L (ref 10–44)
ALT SERPL W/O P-5'-P-CCNC: 21 U/L (ref 10–44)
ANION GAP SERPL CALC-SCNC: 10 MMOL/L (ref 8–16)
ANION GAP SERPL CALC-SCNC: 15 MMOL/L (ref 8–16)
AST SERPL-CCNC: 44 U/L (ref 10–40)
AST SERPL-CCNC: 51 U/L (ref 10–40)
BASOPHILS # BLD AUTO: 0.08 K/UL (ref 0–0.2)
BASOPHILS # BLD AUTO: 0.09 K/UL (ref 0–0.2)
BASOPHILS NFR BLD: 1.4 % (ref 0–1.9)
BASOPHILS NFR BLD: 1.6 % (ref 0–1.9)
BILIRUB SERPL-MCNC: 0.7 MG/DL (ref 0.1–1)
BILIRUB SERPL-MCNC: 0.7 MG/DL (ref 0.1–1)
BUN SERPL-MCNC: 31 MG/DL (ref 6–20)
BUN SERPL-MCNC: 32 MG/DL (ref 6–20)
CALCIUM SERPL-MCNC: 8.7 MG/DL (ref 8.7–10.5)
CALCIUM SERPL-MCNC: 8.8 MG/DL (ref 8.7–10.5)
CHLORIDE SERPL-SCNC: 101 MMOL/L (ref 95–110)
CHLORIDE SERPL-SCNC: 105 MMOL/L (ref 95–110)
CO2 SERPL-SCNC: 16 MMOL/L (ref 23–29)
CO2 SERPL-SCNC: 22 MMOL/L (ref 23–29)
CREAT SERPL-MCNC: 4.3 MG/DL (ref 0.5–1.4)
CREAT SERPL-MCNC: 4.6 MG/DL (ref 0.5–1.4)
DIFFERENTIAL METHOD BLD: ABNORMAL
DIFFERENTIAL METHOD BLD: ABNORMAL
EOSINOPHIL # BLD AUTO: 0.2 K/UL (ref 0–0.5)
EOSINOPHIL # BLD AUTO: 0.3 K/UL (ref 0–0.5)
EOSINOPHIL NFR BLD: 3.1 % (ref 0–8)
EOSINOPHIL NFR BLD: 5 % (ref 0–8)
ERYTHROCYTE [DISTWIDTH] IN BLOOD BY AUTOMATED COUNT: 16.5 % (ref 11.5–14.5)
ERYTHROCYTE [DISTWIDTH] IN BLOOD BY AUTOMATED COUNT: 16.8 % (ref 11.5–14.5)
EST. GFR  (NO RACE VARIABLE): 10.5 ML/MIN/1.73 M^2
EST. GFR  (NO RACE VARIABLE): 11.4 ML/MIN/1.73 M^2
GLUCOSE SERPL-MCNC: 259 MG/DL (ref 70–110)
GLUCOSE SERPL-MCNC: 270 MG/DL (ref 70–110)
HCT VFR BLD AUTO: 34.2 % (ref 37–48.5)
HCT VFR BLD AUTO: 34.8 % (ref 37–48.5)
HGB BLD-MCNC: 10.7 G/DL (ref 12–16)
HGB BLD-MCNC: 10.9 G/DL (ref 12–16)
IMM GRANULOCYTES # BLD AUTO: 0.02 K/UL (ref 0–0.04)
IMM GRANULOCYTES # BLD AUTO: 0.04 K/UL (ref 0–0.04)
IMM GRANULOCYTES NFR BLD AUTO: 0.3 % (ref 0–0.5)
IMM GRANULOCYTES NFR BLD AUTO: 0.7 % (ref 0–0.5)
LYMPHOCYTES # BLD AUTO: 0.9 K/UL (ref 1–4.8)
LYMPHOCYTES # BLD AUTO: 0.9 K/UL (ref 1–4.8)
LYMPHOCYTES NFR BLD: 15.4 % (ref 18–48)
LYMPHOCYTES NFR BLD: 15.6 % (ref 18–48)
MAGNESIUM SERPL-MCNC: 1.9 MG/DL (ref 1.6–2.6)
MCH RBC QN AUTO: 30.4 PG (ref 27–31)
MCH RBC QN AUTO: 30.9 PG (ref 27–31)
MCHC RBC AUTO-ENTMCNC: 31.3 G/DL (ref 32–36)
MCHC RBC AUTO-ENTMCNC: 31.3 G/DL (ref 32–36)
MCV RBC AUTO: 97 FL (ref 82–98)
MCV RBC AUTO: 99 FL (ref 82–98)
MONOCYTES # BLD AUTO: 0.7 K/UL (ref 0.3–1)
MONOCYTES # BLD AUTO: 0.9 K/UL (ref 0.3–1)
MONOCYTES NFR BLD: 12.3 % (ref 4–15)
MONOCYTES NFR BLD: 15.7 % (ref 4–15)
NEUTROPHILS # BLD AUTO: 3.6 K/UL (ref 1.8–7.7)
NEUTROPHILS # BLD AUTO: 3.8 K/UL (ref 1.8–7.7)
NEUTROPHILS NFR BLD: 62.2 % (ref 38–73)
NEUTROPHILS NFR BLD: 66.7 % (ref 38–73)
NRBC BLD-RTO: 0 /100 WBC
NRBC BLD-RTO: 0 /100 WBC
OHS QRS DURATION: 92 MS
OHS QTC CALCULATION: 487 MS
PHOSPHATE SERPL-MCNC: 3.6 MG/DL (ref 2.7–4.5)
PLATELET # BLD AUTO: 149 K/UL (ref 150–450)
PLATELET # BLD AUTO: 159 K/UL (ref 150–450)
PMV BLD AUTO: 11.5 FL (ref 9.2–12.9)
PMV BLD AUTO: 11.5 FL (ref 9.2–12.9)
POCT GLUCOSE: 254 MG/DL (ref 70–110)
POTASSIUM SERPL-SCNC: 3.8 MMOL/L (ref 3.5–5.1)
POTASSIUM SERPL-SCNC: 4.2 MMOL/L (ref 3.5–5.1)
PROT SERPL-MCNC: 7.4 G/DL (ref 6–8.4)
PROT SERPL-MCNC: 7.5 G/DL (ref 6–8.4)
RBC # BLD AUTO: 3.46 M/UL (ref 4–5.4)
RBC # BLD AUTO: 3.58 M/UL (ref 4–5.4)
SODIUM SERPL-SCNC: 133 MMOL/L (ref 136–145)
SODIUM SERPL-SCNC: 136 MMOL/L (ref 136–145)
WBC # BLD AUTO: 5.76 K/UL (ref 3.9–12.7)
WBC # BLD AUTO: 5.79 K/UL (ref 3.9–12.7)

## 2024-02-07 PROCEDURE — 36415 COLL VENOUS BLD VENIPUNCTURE: CPT | Mod: HCNC | Performed by: HOSPITALIST

## 2024-02-07 PROCEDURE — 97530 THERAPEUTIC ACTIVITIES: CPT | Mod: HCNC

## 2024-02-07 PROCEDURE — 36415 COLL VENOUS BLD VENIPUNCTURE: CPT | Mod: HCNC,XB | Performed by: STUDENT IN AN ORGANIZED HEALTH CARE EDUCATION/TRAINING PROGRAM

## 2024-02-07 PROCEDURE — 85025 COMPLETE CBC W/AUTO DIFF WBC: CPT | Mod: HCNC | Performed by: HOSPITALIST

## 2024-02-07 PROCEDURE — 84100 ASSAY OF PHOSPHORUS: CPT | Mod: HCNC | Performed by: STUDENT IN AN ORGANIZED HEALTH CARE EDUCATION/TRAINING PROGRAM

## 2024-02-07 PROCEDURE — 80053 COMPREHEN METABOLIC PANEL: CPT | Mod: 91,HCNC | Performed by: HOSPITALIST

## 2024-02-07 PROCEDURE — 99232 SBSQ HOSP IP/OBS MODERATE 35: CPT | Mod: HCNC,,,

## 2024-02-07 PROCEDURE — 97110 THERAPEUTIC EXERCISES: CPT | Mod: HCNC

## 2024-02-07 PROCEDURE — 93005 ELECTROCARDIOGRAM TRACING: CPT | Mod: HCNC

## 2024-02-07 PROCEDURE — 93010 ELECTROCARDIOGRAM REPORT: CPT | Mod: HCNC,,, | Performed by: INTERNAL MEDICINE

## 2024-02-07 PROCEDURE — 83735 ASSAY OF MAGNESIUM: CPT | Mod: HCNC | Performed by: STUDENT IN AN ORGANIZED HEALTH CARE EDUCATION/TRAINING PROGRAM

## 2024-02-07 PROCEDURE — 80053 COMPREHEN METABOLIC PANEL: CPT | Mod: HCNC | Performed by: STUDENT IN AN ORGANIZED HEALTH CARE EDUCATION/TRAINING PROGRAM

## 2024-02-07 PROCEDURE — 85025 COMPLETE CBC W/AUTO DIFF WBC: CPT | Mod: 91,HCNC | Performed by: STUDENT IN AN ORGANIZED HEALTH CARE EDUCATION/TRAINING PROGRAM

## 2024-02-07 PROCEDURE — 25000003 PHARM REV CODE 250: Mod: HCNC | Performed by: HOSPITALIST

## 2024-02-07 RX ORDER — OLANZAPINE 2.5 MG/1
2.5 TABLET ORAL EVERY 6 HOURS PRN
Status: DISCONTINUED | OUTPATIENT
Start: 2024-02-07 | End: 2024-02-07 | Stop reason: HOSPADM

## 2024-02-07 RX ORDER — SERTRALINE HYDROCHLORIDE 50 MG/1
100 TABLET, FILM COATED ORAL DAILY
Status: DISCONTINUED | OUTPATIENT
Start: 2024-02-07 | End: 2024-02-07 | Stop reason: HOSPADM

## 2024-02-07 RX ADMIN — ATORVASTATIN CALCIUM 80 MG: 40 TABLET, FILM COATED ORAL at 09:02

## 2024-02-07 RX ADMIN — SODIUM BICARBONATE 650 MG: 325 TABLET ORAL at 02:02

## 2024-02-07 RX ADMIN — APIXABAN 5 MG: 5 TABLET, FILM COATED ORAL at 09:02

## 2024-02-07 RX ADMIN — SODIUM BICARBONATE 650 MG: 325 TABLET ORAL at 09:02

## 2024-02-07 RX ADMIN — SERTRALINE HYDROCHLORIDE 100 MG: 50 TABLET ORAL at 09:02

## 2024-02-07 RX ADMIN — CARVEDILOL 6.25 MG: 6.25 TABLET, FILM COATED ORAL at 09:02

## 2024-02-07 RX ADMIN — CLOPIDOGREL BISULFATE 75 MG: 75 TABLET ORAL at 09:02

## 2024-02-07 RX ADMIN — ISOSORBIDE DINITRATE 40 MG: 20 TABLET ORAL at 02:02

## 2024-02-07 RX ADMIN — ISOSORBIDE DINITRATE 40 MG: 20 TABLET ORAL at 09:02

## 2024-02-07 NOTE — PLAN OF CARE
Problem: Adult Inpatient Plan of Care  Goal: Plan of Care Review  Outcome: Met  Goal: Patient-Specific Goal (Individualized)  Outcome: Met  Goal: Absence of Hospital-Acquired Illness or Injury  Outcome: Met  Goal: Optimal Comfort and Wellbeing  Outcome: Met  Goal: Readiness for Transition of Care  Outcome: Met     Problem: Bariatric Environmental Safety  Goal: Safety Maintained with Care  Outcome: Met     Problem: Diabetes Comorbidity  Goal: Blood Glucose Level Within Targeted Range  Outcome: Met     Problem: Infection  Goal: Absence of Infection Signs and Symptoms  Outcome: Met     Problem: Skin Injury Risk Increased  Goal: Skin Health and Integrity  Outcome: Met     Problem: Device-Related Complication Risk (Hemodialysis)  Goal: Safe, Effective Therapy Delivery  Outcome: Met     Problem: Hemodynamic Instability (Hemodialysis)  Goal: Effective Tissue Perfusion  Outcome: Met     Problem: Infection (Hemodialysis)  Goal: Absence of Infection Signs and Symptoms  Outcome: Met

## 2024-02-07 NOTE — PLAN OF CARE
Recommendations    Modify to 2000 Calorie Diabetic/Renal diet.  2.  If PO intake <50% at meals, recommend Boost Glucose Control BID (flavor per patient).  3. Monitor I/O's, weight, and labs.  4. RD to monitor and follow-up.    Goals: Meet % EEN/EPN by follow-up date.  Nutrition Goal Status: new  Communication of RD Recs: other (comment) (POC)

## 2024-02-07 NOTE — PROGRESS NOTES
"CONSULTATION LIAISON PSYCHIATRY PROGRESS NOTE    Patient Name: Suyapa Connelly  MRN: [id number]  Patient Class: IP- Inpatient  Admission Date: 2/4/2024  Attending Physician: Sumanth Christina MD      SUBJECTIVE:   Suyapa Connelly is a 58 y.o. 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     Psychiatry consulted for "hallucinations/crying"    Today, pt sitting up in bed watching TV. Appears to have been crying prior to me entering room. States she is still feeling "sad" today. She is alert and oriented to person and place only. States the year is 2002 and month is December. She is unable to identify our current or past presidents.     Lengthy discussion regarding coping skills and dealing with depression and anxiety was had. 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 She does state she would like to give the Zoloft more time to help with her depression and then consider therapy later on.     States she is still having trouble sleeping at night but has been able to take naps during the day. Discussed importance of trying to sleep at night versus during the day and she states she understands. Reports her  stayed overnight with her but is at work at this time. Attempted to call  again today for collateral information with no answer.    She denies ever experiencing visual or auditory hallucinations. Denies thoughts of wanting to harm herself.       OBJECTIVE:    Mental Status Exam:  General Appearance: appears stated age, well developed and nourished, adequately groomed and appropriately dressed, in no acute distress  Behavior: normal; cooperative; reasonably friendly, pleasant, and polite; " "appropriate eye-contact; under good behavioral control  Involuntary Movements and Motor Activity: no abnormal involuntary movements noted; no tics, no tremors, no akathisia, no dystonia, no evidence of tardive dyskinesia; no psychomotor agitation or retardation  Gait and Station: unable to assess - patient lying down or seated  Speech and Language: intact; normal rate, rhythm, volume, tone, and pitch; conversational, spontaneous, and coherent; speaks and understands English proficiently and fluently; repeats words and phrases, no word finding difficulties are noted  Mood: "sad"  Affect: tearful  Thought Process and Associations: intact; linear, goal-directed, organized, and logical; no loosening of associations noted  Thought Content and Perceptions:: no suicidal or homicidal ideation, no auditory or visual hallucinations, no paranoid ideation, no ideas of reference, no evidence of delusions or psychosis  Sensorium and Orientation:  stated date as December 2002, oriented to person and place  Recent and Remote Memory: mild impairments noted  Attention and Concentration: grossly intact, attentive to the conversation and not readily distractible, able to spell WORLD forwards and backwards  Fund of Knowledge: impaired, unable to identify the President or the   Insight: intact, demonstrates awareness of illness and situation  Judgment: intact, behavior is adequate/appropriate to the circumstances, compliant with health provider's recommendations and instructions    CAM ICU positive? no      ASSESSMENT & RECOMMENDATIONS   h/o adjustment disorder with mixed anxiety and depressed mood  H/o CAROLIN  H/o insomnia   R/o delirium  R/o encephalopathy         Adjustment disorder  Resume pts home medication: 100mg zoloft Qday  Rec psychology consult once pt states she is ready.     Insomnia  Rec melatonin 6mg PO Qhs    DELIRIUM  DELIRIUM BEHAVIOR MANAGEMENT  PLEASE utilize CHEMICAL restraints with " PRN meds first for agitation. Minimize use of PHYSICAL restraints OR have periods of being out of physical restraints if possible.  Keep window shades open and room lit during day and room dim at night in order to promote normal sleep-wake cycles  Encourage family at bedside. [place] patient often to situation, location, date.  Continue to Limit or Discontinue use of Narcotics, Benzos and Anti-cholinergic medications as they may worsen delirium.  Continue medical workup for causative etiology of Delirium.   Can use Zyprexa 2.5mg PO Q6 for any agitation or insomnia at night   Monitor Qtc     RISK ASSESSMENT  NO NEED FOR PEC patient NOT in any imminent danger of hurting self or others and not gravely disabled.      FOLLOW UP  Will follow up while in house     DISPOSITION - once medically cleared:    Defer to medical team    Please contact ON CALL psychiatry service (24/7) for any acute issues that may arise.    Harinder Enciso PMHNP   Psychiatry  [facility]  2/7/2024 10:51 AM        --------------------------------------------------------------------------------------------------------------------------------------------------------------------------------------------------------------------------------------    CONTINUED OBJECTIVE clinical data & findings reviewed and noted for above decision making    Current Medications:   Scheduled Meds:    apixaban  5 mg Oral BID    atorvastatin  80 mg Oral Daily    carvediloL  6.25 mg Oral BID    clopidogreL  75 mg Oral Daily    insulin detemir U-100 (Levemir)  2 Units Subcutaneous QHS    isosorbide dinitrate  40 mg Oral TID    sertraline  100 mg Oral Daily    sodium bicarbonate  650 mg Oral TID     PRN Meds: dextrose 10%, dextrose 10%, dextrose 10%, dextrose 10%, glucagon (human recombinant), glucagon (human recombinant), glucose, glucose, heparin (porcine), insulin aspart U-100, naloxone, OLANZapine, sodium chloride 0.9%    Allergies:   Review of patient's  allergies indicates:   Allergen Reactions    Ciprofloxacin Itching    Contrast media      Kidney injury    Iodine      Kidney injury    Pcn [penicillins]      Rash; tolerated ceftriaxone on 1/13/20       Vitals  Vitals:    02/07/24 0912   BP: (!) 175/75   Pulse: 85   Resp: 16   Temp: 98.3 °F (36.8 °C)       Labs/Imaging/Studies:  Recent Results (from the past 24 hour(s))   POCT glucose    Collection Time: 02/06/24 11:11 AM   Result Value Ref Range    POCT Glucose 144 (H) 70 - 110 mg/dL   Hepatitis B surface antigen    Collection Time: 02/06/24  2:58 PM   Result Value Ref Range    Hepatitis B Surface Ag Non-reactive Non-reactive   Hepatitis B surface antibody    Collection Time: 02/06/24  2:58 PM   Result Value Ref Range    Hep B S Ab <3.00 mIU/mL    Hep B S Ab Non-reactive    BUN    Collection Time: 02/06/24  2:58 PM   Result Value Ref Range    BUN 47 (H) 6 - 20 mg/dL   POCT glucose    Collection Time: 02/06/24  5:57 PM   Result Value Ref Range    POCT Glucose 116 (H) 70 - 110 mg/dL   BUN    Collection Time: 02/06/24  6:03 PM   Result Value Ref Range    BUN 17 6 - 20 mg/dL   POCT glucose    Collection Time: 02/06/24  8:17 PM   Result Value Ref Range    POCT Glucose 116 (H) 70 - 110 mg/dL   Comprehensive Metabolic Panel (CMP)    Collection Time: 02/07/24  5:19 AM   Result Value Ref Range    Sodium 136 136 - 145 mmol/L    Potassium 4.2 3.5 - 5.1 mmol/L    Chloride 105 95 - 110 mmol/L    CO2 16 (L) 23 - 29 mmol/L    Glucose 270 (H) 70 - 110 mg/dL    BUN 31 (H) 6 - 20 mg/dL    Creatinine 4.3 (H) 0.5 - 1.4 mg/dL    Calcium 8.8 8.7 - 10.5 mg/dL    Total Protein 7.5 6.0 - 8.4 g/dL    Albumin 2.6 (L) 3.5 - 5.2 g/dL    Total Bilirubin 0.7 0.1 - 1.0 mg/dL    Alkaline Phosphatase 349 (H) 55 - 135 U/L    AST 51 (H) 10 - 40 U/L    ALT 21 10 - 44 U/L    eGFR 11.4 (A) >60 mL/min/1.73 m^2    Anion Gap 15 8 - 16 mmol/L   Magnesium    Collection Time: 02/07/24  5:19 AM   Result Value Ref Range    Magnesium 1.9 1.6 - 2.6 mg/dL    Phosphorus    Collection Time: 02/07/24  5:19 AM   Result Value Ref Range    Phosphorus 3.6 2.7 - 4.5 mg/dL   CBC with Automated Differential    Collection Time: 02/07/24  5:19 AM   Result Value Ref Range    WBC 5.76 3.90 - 12.70 K/uL    RBC 3.46 (L) 4.00 - 5.40 M/uL    Hemoglobin 10.7 (L) 12.0 - 16.0 g/dL    Hematocrit 34.2 (L) 37.0 - 48.5 %    MCV 99 (H) 82 - 98 fL    MCH 30.9 27.0 - 31.0 pg    MCHC 31.3 (L) 32.0 - 36.0 g/dL    RDW 16.8 (H) 11.5 - 14.5 %    Platelets 149 (L) 150 - 450 K/uL    MPV 11.5 9.2 - 12.9 fL    Immature Granulocytes 0.7 (H) 0.0 - 0.5 %    Gran # (ANC) 3.8 1.8 - 7.7 K/uL    Immature Grans (Abs) 0.04 0.00 - 0.04 K/uL    Lymph # 0.9 (L) 1.0 - 4.8 K/uL    Mono # 0.7 0.3 - 1.0 K/uL    Eos # 0.2 0.0 - 0.5 K/uL    Baso # 0.09 0.00 - 0.20 K/uL    nRBC 0 0 /100 WBC    Gran % 66.7 38.0 - 73.0 %    Lymph % 15.6 (L) 18.0 - 48.0 %    Mono % 12.3 4.0 - 15.0 %    Eosinophil % 3.1 0.0 - 8.0 %    Basophil % 1.6 0.0 - 1.9 %    Differential Method Automated    EKG 12-lead    Collection Time: 02/07/24  8:02 AM   Result Value Ref Range    QRS Duration 92 ms    [facility] QTC Calculation 487 ms   POCT glucose    Collection Time: 02/07/24  9:15 AM   Result Value Ref Range    POCT Glucose 254 (H) 70 - 110 mg/dL     Imaging Results               MRI Brain Without Contrast (Final result)  Result time 02/05/24 04:54:40      Final result by Lino Peter MD (02/05/24 04:54:40)                   Impression:      Minimal punctate increased diffusion signal in the bilateral corona radiata, potentially related to tiny recent infarcts or T2 shine through.  Suggest correlation with symptoms in this patient with mental status change.    Extensive periventricular white matter hyperintense signal, nonspecific, though likely related to chronic microvascular ischemic change.  Findings are similar to slightly worse when compared with the prior MRI in 2022.    Similar prominent ventricles.    This report was flagged in Epic as  abnormal.      Electronically signed by: Lino Peter MD  Date:    02/05/2024  Time:    04:54               Narrative:    EXAMINATION:  MRI BRAIN WITHOUT CONTRAST    CLINICAL HISTORY:  Mental status change, unknown cause;    TECHNIQUE:  Multiplanar multisequence MR imaging of the brain was performed without contrast.    COMPARISON:  MRI, 08/11/2022.  CT head, 02/04/2024.    FINDINGS:  Exam quality is limited by motion.    Punctate increased diffusion signal in the bilateral corona radiata, potentially T2 shine through or punctate recent infarcts.  No additional abnormal diffusion restriction.    FLAIR imaging demonstrates extensive patchy and confluent periventricular and subcortical white matter signal hyperintensity, suggestive chronic microvascular ischemic change, noting similar to mildly worse white matter signal abnormalities when compared with prior MRI.    Ventricles are prominent, with similar size when compared with prior CT and prior MRI.  Generalized cerebral volume loss.    No evidence of mass lesion, hemorrhage, edema or recent or remote major vascular distribution infarction.  Punctate low GRE signal in the white matter adjacent to the posterior horn of the right lateral ventricle, likely from remote microhemorrhage.  Similar appearance of pericallosal lipoma.    No extra-axial blood or fluid collections.    T2 skull base flow voids are preserved. Bone marrow signal intensity is unremarkable.                                       X-Ray Chest AP Portable (Final result)  Result time 02/04/24 17:25:25      Final result by Agustin Barba MD (02/04/24 17:25:25)                   Impression:      As above      Electronically signed by: Agustin Barba MD  Date:    02/04/2024  Time:    17:25               Narrative:    EXAMINATION:  XR CHEST AP PORTABLE    CLINICAL HISTORY:  Sepsis;    TECHNIQUE:  Single frontal view of the chest was performed.    COMPARISON:  12/02/2023    FINDINGS:  The  cardiomediastinal silhouette is prominent, stable noting calcification of the aorta.  Left central venous catheter tip projects over the distal SVC..  There is no pleural effusion.  The trachea is midline.  The lungs are symmetrically expanded bilaterally with coarse interstitial attenuation bilaterally suggesting edema..  No new large focal consolidation seen.  There is no pneumothorax.  The osseous structures are unchanged..                                       CT Head Without Contrast (Final result)  Result time 02/04/24 16:44:37      Final result by Agustin Barba MD (02/04/24 16:44:37)                   Impression:      1. Allowing for extensive motion artifact, no convincing acute intracranial abnormalities noting sequela of chronic microvascular ischemic change and senescent change.  2. Stable ventricular prominence, correlation with any history of normal pressure hydrocephalus.      Electronically signed by: Agustin Barba MD  Date:    02/04/2024  Time:    16:44               Narrative:    EXAMINATION:  CT HEAD WITHOUT CONTRAST    CLINICAL HISTORY:  Mental status change, unknown cause;unresponsive;    TECHNIQUE:  Low dose axial images were obtained through the head.  Coronal and sagittal reformations were also performed. Contrast was not administered.    COMPARISON:  11/20/2023    FINDINGS:  There is extensive motion artifact.    There is generalized cerebral volume loss.  There is hypoattenuation in a periventricular fashion, likely sequela of chronic microvascular ischemic change.  There is no evidence of acute major vascular territory infarct, hemorrhage, or mass.  There is stable ventricular prominence.  There are no abnormal extra-axial fluid collections.  The paranasal sinuses and mastoid air cells are clear, and there is no evidence of calvarial fracture.  The visualized soft tissues are unremarkable.

## 2024-02-07 NOTE — PT/OT/SLP PROGRESS
Occupational Therapy   Treatment    Name: Suyapa Connelly  MRN: [id number]  Admitting Diagnosis:  Encephalopathy       Recommendations:     Discharge Recommendations:    Discharge Equipment Recommendations:  wheelchair  Barriers to discharge:  None    Assessment:     Suyapa Connelly is a 58 y.o. female with a medical diagnosis of Encephalopathy.  She presents with performance deficits affecting function are weakness, impaired balance, impaired endurance, impaired self care skills, impaired functional mobility, gait instability, decreased upper extremity function. Pt tolerated tx well, with good effort and motivation throughout. Pt participated in BUE/LE therex and maintained good sitting balance throughout. Pt will continue to benefit from skilled OT services to address impairments listed above to maximize independence with ADLs and functional mobility to ensure safe return to PLOF.     Rehab Prognosis:  Good; patient would benefit from acute skilled OT services to address these deficits and reach maximum level of function.       Plan:     Patient to be seen 4 x/week to address the above listed problems via self-care/home management, therapeutic activities, therapeutic exercises, neuromuscular re-education  Plan of Care Expires: 03/06/24  Plan of Care Reviewed with: patient    Subjective     Chief Complaint: none  Patient/Family Comments/goals: w/c not present in room  Pain/Comfort:  Pain Rating 1: 0/10  Pain Rating Post-Intervention 1: 0/10    Objective:     Communicated with: RN prior to session.  Patient found HOB elevated asleep with telemetry upon OT entry to room.    General Precautions: Standard, fall    Orthopedic Precautions: (B AKA)  Braces: N/A  Respiratory Status: Room air     Occupational Performance:     Bed Mobility:    Patient completed Rolling/Turning to Right with contact guard assistance  Patient completed Scooting/Bridging with contact guard assistance  Patient completed Supine to Sit with stand  by assistance  Patient completed Sit to Supine with stand by assistance     Functional Mobility/Transfers:  Not performed, pt is a B AKA with no w/c present in room   Functional Mobility: Pt performed multiple therex seated EOB with goos sitting balance, SBA from OT for safety during lateral weigthshifting      Activities of Daily Living:  Grooming: modified independence facial hygiene seated EOB      AMPA 6 Click ADL: 18    Treatment & Education:  From seated EOB, pt completed BUE/LE therex (x 10 reps each) including:  - lat rows  - straight arm raises  - Hor Abd/Add  - hip flexion   - 1x5 forearm lateral weightshifts with 5 second hold    Pt educated on   Role of OT and OT POC  Importance of EOB activities to increase endurance and tolerance for increased participation in daily ADLs    Utilizing the call bell to request for assistance with all functional mobility to ensure safety during hospital stay.    Pt verbalized understanding and all questions were addressed within the scope of OT.       Patient left HOB elevated with all lines intact and call button in reach    GOALS:   Multidisciplinary Problems       Occupational Therapy Goals          Problem: Occupational Therapy    Goal Priority Disciplines Outcome Interventions   Occupational Therapy Goal     OT, PT/OT Ongoing, Progressing    Description: Goals to be met by: 03/06/2024     Patient will increase functional independence with ADLs by performing:    UE Dressing with Modified Canon.  LE Dressing with Modified Canon.  Grooming while seated with Set-up Assistance.  Toileting from bedside commode with Contact Guard Assistance for hygiene and clothing management.   Supine to sit with Modified Canon.  Wheelchair transfer from bed with Contact Guard Assistance                         Time Tracking:     OT Date of Treatment: 02/07/24  OT Start Time: 1122  OT Stop Time: 1149  OT Total Time (min): 27 min    Billable Minutes:Therapeutic  Activity 10  Therapeutic Exercise 17    OT/MACKENZIE: OT          2/7/2024

## 2024-02-07 NOTE — PLAN OF CARE
Problem: Adult Inpatient Plan of Care  Goal: Plan of Care Review  Outcome: Ongoing, Progressing  Goal: Patient-Specific Goal (Individualized)  Outcome: Ongoing, Progressing  Goal: Absence of Hospital-Acquired Illness or Injury  Outcome: Ongoing, Progressing  Goal: Optimal Comfort and Wellbeing  Outcome: Ongoing, Progressing  Goal: Readiness for Transition of Care  Outcome: Ongoing, Progressing     Problem: Bariatric Environmental Safety  Goal: Safety Maintained with Care  Outcome: Ongoing, Progressing     Problem: Diabetes Comorbidity  Goal: Blood Glucose Level Within Targeted Range  Outcome: Ongoing, Progressing     Problem: Infection  Goal: Absence of Infection Signs and Symptoms  Outcome: Ongoing, Progressing     Problem: Skin Injury Risk Increased  Goal: Skin Health and Integrity  Outcome: Ongoing, Progressing     Problem: Device-Related Complication Risk (Hemodialysis)  Goal: Safe, Effective Therapy Delivery  Outcome: Ongoing, Progressing     Problem: Hemodynamic Instability (Hemodialysis)  Goal: Effective Tissue Perfusion  Outcome: Ongoing, Progressing     Problem: Infection (Hemodialysis)  Goal: Absence of Infection Signs and Symptoms  Outcome: Ongoing, Progressing

## 2024-02-07 NOTE — PLAN OF CARE
[facility] [address] - Med Surg  Discharge Final Note    Primary Care Provider: Magen Christensen MD    Expected Discharge Date: 2/8/2024    Final Discharge Note (most recent)       Final Note - 02/07/24 1119          Final Note    Assessment Type Final Discharge Note     Anticipated Discharge Disposition Home or Self Care     Hospital Resources/Appts/Education Provided Appointments scheduled and added to AVS        Post-Acute Status    Post-Acute Authorization HME     HME Status Referrals Sent     Other Status No Post-Acute Service Needs     Discharge Delays None known at this time                     Important Message from Medicare             Contact Info       Magen Christensen MD   Specialty: Family Medicine   Relationship: PCP - General    [address] [facility] [address]   Phone: [phone]       Next Steps: Follow up    [facility]   Specialty: DME Provider    [address] [address]   Phone: [phone]       Next Steps: Follow up today

## 2024-02-07 NOTE — DISCHARGE INSTRUCTIONS
Please follow through with your cardiology appointment to consider getting an intra cardiac defibrillator device as your heart is pumping action is severely low and makes you at high-risk of developing deadly arrhythmias.    Have your primary care doctor check your blood work within 7-10 days to ensure stability of electrolytes.

## 2024-02-07 NOTE — PROGRESS NOTES
RD flagged for A1C of 9.7%. Diet education is not appropriate at this time. RD to provide education if pt remains admitted at a later date. RD recommends outpatient diabetic management clinic referral for further education. Recommendations provided below.      Recommendations    Modify to 2000 Calorie Diabetic/Renal diet.  2.  If PO intake <50% at meals, recommend Boost Glucose Control BID (flavor per patient).  3. Monitor I/O's, weight, and labs.  4. RD to monitor and follow-up.    Goals: Meet % EEN/EPN by follow-up date.  Nutrition Goal Status: new  Communication of RD Recs: other (comment) (POC)      Thanks!    Blanca Zavala Registration Eligible, Provisional LDN

## 2024-02-07 NOTE — PLAN OF CARE
Left message with Amira with [employer] vest at , left VM to follow with vest. Pt confirmed with Sw and nursing that she has a life vest at home already, Sw will update [employer] rep Amira.

## 2024-02-07 NOTE — PLAN OF CARE
[facility] , provided spouse's contact number. Sw will follow with life vest for dc as Sw provided communication through [facility] for dc with Life Vest through [employer].

## 2024-02-07 NOTE — PROGRESS NOTES
Hemodialysis treatment completed.    Treatment time received: 3 hours    Net fluid removed: 1.5 liters    Medications received: isosorbide per MAR    No issues during HD treatment.     Blood returned, heparin locked ports per order, capped, and taped.    Report given to Emerita Brown RN  Refer to dialysis flowsheet and MAR for details.  Patient transported back in stretcher from Dialysis to primary unit.

## 2024-02-08 NOTE — DISCHARGE SUMMARY
[facility] Medicine  Discharge Summary      Patient Name: Suyapa Connelly  MRN: [id number]  CHAVEZ: [id number]  Patient Class: IP- Inpatient  Admission Date: 2/4/2024  Hospital Length of Stay: 1 days  Discharge Date and Time: 2/7/2024  5:55 PM  Attending Physician: No att. 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  She is accompanied by her  who states that the patient woke up this am and was completely unresponsive to verbal cues this morning.  Day prior, the patient had been having visual hallucinations Claritin her  where she was trying to talk to people in the room who were not there.  Seemed as though that she was not having full conversations at the time.  The patient was able to take evening medications and then went to bed.  This morning the patient is completely unresponsive to stimuli and has been sleeping the entire day.  Occasionally she will open her eyes to stimuli and apparently she was able to squeeze her hand when asked by the emergency physician however I did not experience this.  There was no convulsive episode prior to this.  A CT of her head was negative.     states that there was 1 prior episode of this happening that was related to gabapentin overdose however she has not been taking any gabapentin in his instead using only pregabalin. She also had her quetiapine doubled approximately 1 month ago and was prescribed a new medication for sleep but is unclear as to which one.  In the emergency room vitals were relatively stable  except for some mild hypertension.  A metabolic panel showed mild hyponatremia of 134, a BUN and creatinine of 37 and 5.4, alkaline phosphatase of 339, magnesium 2.2, ammonia was 44, troponin was 0.047 and a BNP was elevated at 1024 although it is unclear what her baseline is given her ESRD.  A CBC was collected which showed some mild but at goal anemia of 11.7.  A CT head showed chronic microvascular changes but no acute changes and a chest x-ray was largely unremarkable outside of some interstitial infiltrates suggestive of early pulmonary edema which correlates with her BNP elevation..  Blood cultures were drawn but no empiric antibiotics were given.  A UA is pending but it is unclear if the patient makes urine at all.      * No surgery found *      Hospital Course:   Patient was monitored conservatively.  Patient's mental status had markedly improved after witholding home CNS depressants and anticholinergics.  EEG showed nonspecific slowing but no definitive epileptiform activity.  MRI not showing any concrete definitive new significant findings. Out of 's concern for pt's hallucinations, Psychiatry evaluated the pt; no PEC needed; rec'd outpt f/u with primary provider. Pt also provided lifevest for low EF noted on chart; referrals placed for outpt cards f/u.    Patient has met maximum benefit from hospitalization is clinically stable for discharge.  Attempts to contact  unsuccessful after initial encounter w/ .    Clear lungs bilaterally, unlabored breathing, on room air, no cyanosis  Heart sounds indicate a regular rate and rhythm   Follows motor commands   No focal motor upper extremity deficits   Bilateral lower extremity amputee   Sleeping, easily woken, no acute distress   Oriented to self and place, partially to time     Goals of Care Treatment Preferences:  Code Status: Full Code      Consults:   Consults (From admission, onward)          Status Ordering Provider     Inpatient  consult to Social Work/Case Management  Once        Provider:  (Not yet assigned)    Acknowledged SERGO REGALADO     Inpatient consult to Psychiatry  Once        Provider:  (Not yet assigned)    Completed SERGO REGALADO     Inpatient consult to Nephrology  Once        Provider:  (Not yet assigned)    Completed VANESSA PENNINGTON            No new Assessment & Plan notes have been filed under this hospital service since the last note was generated.  Service: Hospital Medicine    Final Active Diagnoses:    Diagnosis Date Noted POA    PRINCIPAL PROBLEM:  Encephalopathy [G93.40] 05/20/2021 Yes    Unresponsive [R41.89] 02/05/2024 Unknown    Severe obesity (BMI 35.0-39.9) with comorbidity [E66.01] 05/30/2023 Yes    ESRD (end stage renal disease) [N18.6] 04/21/2023 Yes    S/P AKA (above knee amputation) bilateral [Z89.611, Z89.612] 03/26/2022 Not Applicable    Debility [R53.81] 02/12/2022 Yes    Chronic combined systolic and diastolic heart failure [I50.42] 06/21/2021 Yes    S/P femoral-popliteal bypass surgery [Z95.828] 08/21/2020 Not Applicable    Paroxysmal atrial fibrillation [I48.0] 01/28/2020 Yes    S/P CABG x 1 [Z95.1] 01/27/2020 Not Applicable    Anemia due to chronic kidney disease, on chronic dialysis [N18.6, D63.1, Z99.2] 01/27/2020 Not Applicable    Type 2 diabetes mellitus with hyperglycemia, with long-term current use of insulin [E11.65, Z79.4] 01/02/2020 Not Applicable      Problems Resolved During this Admission:    Diagnosis Date Noted Date Resolved POA    Essential hypertension [I10] 05/05/2018 02/06/2024 Yes       Discharged Condition: good    Disposition: Home-Health Care [facility]    Follow Up:   Follow-up Information       Magen Christensen MD Follow up.    Specialty: Family Medicine  Contact information:  [address] [address]  [phone]               Naida Her - Follow up today.    Specialty: DME Provider  Contact information:  [address]   [place]  "[id number]  [phone]               Aubrey Giles MD. Schedule an appointment as soon as possible for a visit in 2 week(s).    Specialty: Cardiology  Contact information:  [address]  [facility] [id number] [phone]                           Patient Instructions:      WHEELCHAIR FOR HOME USE     Order Specific Question Answer Comments   Hours in W/C per day: 20    Type of Wheelchair: Standard    Size(Width): 18"(STD adult)    Leg Support: Swing Away    Lap Belt: Buckle    Accessories: Anti-tippers    Cushion: Basic    Reclining Back Yes    Headrest: No    Height: 4' (1.219 m)    Weight: 76.7 kg (169 lb 1.5 oz)    Does patient have medical equipment at home? wheelchair pt's  states she needs a new w/c due to the brakes don't lock; pt had jany lift sling in her room / pt's  states she needs a new w/c due to the brakes don't lock; pt had jany lift sling in her room / pt's  states she needs a new w/   Does patient have medical equipment at home? bath bench    Does patient have medical equipment at home? grab bar    Length of need (1-99 months): 99    Please check all that apply: Patient mobility limitations cannot be sufficiently resolved by the use of other ambulatory therapies.    Please check all that apply: The patient requires the use of a w/c for activities of daily living within the Home.    Please check all that apply: Patient's upper body strength is sufficient for propulsion.    Please check all that apply: Caregiver is capable and willing to operate wheelchair safely.      Ambulatory referral/consult to Cardiology   Standing Status: Future   Referral Priority: Routine Referral Type: Consultation   Referral Reason: Specialty Services Required   Requested Specialty: Cardiology   Number of Visits Requested: 1     Ambulatory referral/consult to Cardiac Electrophysiology   Standing Status: Future   Referral Priority: Routine Referral Type: Consultation   Referral Reason: " "Specialty Services Required   Requested Specialty: Cardiology   Number of Visits Requested: 1       Significant Diagnostic Studies: N/A    Pending Diagnostic Studies:       Procedure Component Value Units Date/Time    Aluminum level [[id number]]     Order Status: Sent Lab Status: No result     Specimen: Blood            Medications:  Reconciled Home Medications:      Medication List        CONTINUE taking these medications      ACCU-CHEK GUIDE ME GLUCOSE MTR Misc  Generic drug: blood-glucose meter  To check BG 3 times daily, to use with insurance preferred meter     ACCU-CHEK GUIDE TEST STRIPS Strp  Generic drug: blood sugar diagnostic  To check BG 3 times daily, to use with insurance preferred meter     ACCU-CHEK SOFTCLIX LANCETS Misc  Generic drug: lancets  To check BG 3 times daily, to use with insurance preferred meter     allopurinoL 100 MG tablet  Commonly known as: ZYLOPRIM  Take 0.5 tablets (50 mg total) by mouth every other day.     apixaban 5 mg Tab  Commonly known as: ELIQUIS  Take 1 tablet (5 mg total) by mouth 2 (two) times daily.     atorvastatin 80 MG tablet  Commonly known as: LIPITOR  Take 1 tablet (80 mg total) by mouth once daily.     BD MIGUEL 2ND GEN PEN NEEDLE 32 gauge x 5/32" Ndle  Generic drug: pen needle, diabetic  1 Units by Misc.(Non-Drug; Combo Route) route before meals as needed (SSI).     calcitRIOL 0.5 MCG Cap  Commonly known as: ROCALTROL  Take 1 capsule (0.5 mcg total) by mouth once daily.     calcium acetate(phosphat bind) 667 mg capsule  Commonly known as: PHOSLO  Take 1 capsule (667 mg total) by mouth 3 (three) times daily with meals.     carvediloL 6.25 MG tablet  Commonly known as: COREG  Take 1 tablet (6.25 mg total) by mouth 2 (two) times daily.     clopidogreL 75 mg tablet  Commonly known as: PLAVIX  Take 1 tablet (75 mg total) by mouth once daily.     famotidine 20 MG tablet  Commonly known as: PEPCID  Take 1 tablet (20 mg total) by mouth 2 (two) times daily.     FIASP " FLEXTOUCH U-100 INSULIN 100 unit/mL (3 mL) Inpn  Generic drug: insulin aspart (niacinamide)  Inject 1-5 Units into the skin before meals as needed (Per sliding scale). Blood sugar 150 to 200 add 1 units.  Blood sugar 201 to 250 add 2 units.  Blood sugar 251 to 300 add 3 units  Blood sugar 301 to 350 add 4 units.  Blood sugar greater than 350 add 5 units.     furosemide 40 MG tablet  Commonly known as: LASIX  Take 1 tablet (40 mg total) by mouth 2 (two) times a day.     hydrALAZINE 50 MG tablet  Commonly known as: APRESOLINE  Take 1 tablet (50 mg total) by mouth 3 (three) times daily.     insulin detemir U-100 (Levemir) 100 unit/mL (3 mL) Inpn pen  Inject 8 Units into the skin every evening.     isosorbide dinitrate 20 MG tablet  Commonly known as: ISORDIL  Take 2 tablets (40 mg total) by mouth 3 (three) times daily.     sertraline 100 MG tablet  Commonly known as: ZOLOFT  Take 1 tablet (100 mg total) by mouth once daily.     sevelamer carbonate 800 mg Tab  Commonly known as: RENVELA  Take 2 tablets (1,600 mg total) by mouth 3 (three) times daily.     sodium bicarbonate 650 MG tablet  Take 1 tablet (650 mg total) by mouth 3 (three) times daily.     traZODone 50 MG tablet  Commonly known as: DESYREL  Take 0.5 tablets (25 mg total) by mouth every evening.            STOP taking these medications      hydrOXYzine HCL 25 MG tablet  Commonly known as: ATARAX     pregabalin 100 MG capsule  Commonly known as: LYRICA              Indwelling Lines/Drains at time of discharge:   Lines/Drains/Airways       Central Venous Catheter Line  Duration                  Hemodialysis Catheter 05/25/23 1336 left internal jugular 258 days              Drain  Duration             Female External Urinary Catheter w/ Suction 02/05/24 5794 1 day                    Time spent on the discharge of patient: 35 minutes         Sumanth Christina MD  Department of Hospital Medicine  [facility]

## 2024-02-09 LAB
BACTERIA BLD CULT: NORMAL
BACTERIA BLD CULT: NORMAL

## 2024-02-19 ENCOUNTER — DOCUMENTATION ONLY (OUTPATIENT)
Dept: TRANSPLANT | Facility: CLINIC | Age: 59
End: 2024-02-19
Payer: MEDICARE

## 2024-02-22 ENCOUNTER — TELEPHONE (OUTPATIENT)
Dept: TRANSPLANT | Facility: CLINIC | Age: 59
End: 2024-02-22
Payer: MEDICARE

## 2024-02-27 NOTE — TELEPHONE ENCOUNTER
----- Message from Karen Armando sent at 2/27/2024 10:29 AM CST -----  Type:  RX Refill Request    Who Called:  pt    Refill or New Rx:  refill   RX Name and Strength:  sodium bicarbonate 650 MG tablet  How is the patient currently taking it? (ex. 1XDay):  as direct   Is this a 30 day or 90 day RX:  90  Preferred Pharmacy with phone number:  Store #[id number]  [facility] at   [address] [address]: [facility] [address]    Phone  [phone] not actionable to desktop users since it is disabled     Local or Mail Order:  local   Ordering Provider:  roberto Sorto Call Back Number:  [phone] (home)     Additional Information:  please advise

## 2024-02-28 RX ORDER — SODIUM BICARBONATE 650 MG/1
TABLET ORAL
Qty: 90 TABLET | Refills: 0 | Status: SHIPPED | OUTPATIENT
Start: 2024-02-28 | End: 2024-05-03

## 2024-03-01 NOTE — PT/OT/SLP PROGRESS
Physical Therapy  Daily Treatment Note  Date: 3/1/2024  Patient Name: Dahlia Quinn  MRN: [id number]     :   1974    Referring Physician: Kedar Doty DO Dr. Balk   PCP: Td Hatfield, APRN - CNP    Medical Diagnosis: Tendonitis, Achilles, right [M76.61]    Treatment Diagnosis: R achilles tendonitis, decreased and painful ARoM, weakness      Insurance: Payor: [facility] / Plan: [facility] MEDICAID / Product Type: *No Product type* /   Insurance ID: [id number] - (Medicaid Managed)    Subjective:   General  Referring Provider (secondary): Dr. Doty  PT Visit Information  Onset Date: 23  Total # of Visits Approved: 14  Total # of Visits to Date: 4  Plan of Care/Certification Expiration Date: 24  No Show: 1  Canceled Appointment: 1  Referring Provider (secondary): Dr. Doty  Subjective  Subjective: Pt. arrives with 7/10 pain trying to rush to get here after taking her daughter to work.  Pt. states was a 5-6/10.  Pt. states she has been practicing walking with and without cane as will have surgery on her wrist at the end of the month.  Pain Screening  Patient Currently in Pain: Yes  Pain Assessment: 0-10  Pain Level: 7  Pain Type: Chronic pain  Pain Location: Ankle (R achilles)  Pain Orientation: Right  Pain Descriptors: Tightness, Sore       Treatment Activities:  Exercises:  Therapeutic exercise (CPT 08577)   Treatment Reasoning    Exercise 1: seated with feet on stool DF/PF and INV/EV x 10 hold 3 sec  Exercise 3: ankle circles cw and ccw x 10  Exercise 4: gastroc stretch long sitting H 30 sec x 3  Exercise 5: sit<>stand x 5 from chair wiht standing 10 sec with heels on ground                          Gait: (CPT 52296)  Treatment Reasoning    Gait Training 1: amb 120' with Rollator focusing on heel toe pattern mild pinching. Limitations addressed: Mobility, Flexibility, Activity tolerance                   Manual Therapy: (CPT 68776) Treatment Reasoning     Joint Mobilization: PROM/Stretch to R  Physical Therapy Treatment    Patient Name:  Suyapa Connelly   MRN:  [id number]    Recommendations:     Discharge Recommendations:  rehabilitation facility   Discharge Equipment Recommendations: (TBD)     Assessment:     Suyapa Connelly is a 54 y.o. female admitted with a medical diagnosis of Acute coronary syndrome.  She presents with the following impairments/functional limitations:  weakness, impaired endurance, impaired self care skills, impaired functional mobilty, impaired balance, impaired cognition, decreased coordination, decreased upper extremity function, decreased lower extremity function, decreased safety awareness, pain, impaired cardiopulmonary response to activity. Pt willing and motivated to participate in therapy to maximize recovery. Pt with good potential for functional progress. Recommend discharge to IP rehab once medically appropriate to maximize functional mobility, ensure safety, and decrease caregiver burden       Rehab Prognosis: Good; patient would benefit from acute skilled PT services to address these deficits and reach maximum level of function.    Recent Surgery: Procedure(s) (LRB):  CORONARY ARTERY BYPASS GRAFT (CABG) x 1     Off Pump (N/A) 9 Days Post-Op    Plan:     During this hospitalization, patient to be seen 5 x/week to address the identified rehab impairments via gait training, therapeutic exercises, neuromuscular re-education, therapeutic activities and progress toward the following goals:    · Plan of Care Expires:  02/22/20    Subjective     Chief Complaint: pain  Patient/Family Comments/goals: to get better and return home   Pain/Comfort:  · Pain Rating 1: (generalized pain; unrated)  · Pain Addressed 1: Distraction, Reposition, Nurse notified      Objective:     Communicated with RN prior to session and  present in room.  Patient found up in chair with telemetry, pulse ox (continuous), blood pressure cuff, oxygen, central line, peripheral IV, arterial line upon PT  entry to room.     General Precautions: Standard, fall, sternal   Orthopedic Precautions:N/A   Braces: N/A     Functional Mobility:  · Bed Mobility:     · Sit to Supine: total assistance  · Transfers:     · Sit to Stand:  maximal assistance with no AD x 3 trials from chair  · Longest stand ~20 seconds   · Pt unable to obtain fully upright posture   · Chair to Bed: maximal assistance using stand pivot; pt able to assist with advancing B LEs   · Gait: not performed 2nd to impaired standing tolerance, pain, and unable to obtain fully upright posture       AM-PAC 6 CLICK MOBILITY  Turning over in bed (including adjusting bedclothes, sheets and blankets)?: 2  Sitting down on and standing up from a chair with arms (e.g., wheelchair, bedside commode, etc.): 2  Moving from lying on back to sitting on the side of the bed?: 2  Moving to and from a bed to a chair (including a wheelchair)?: 2  Need to walk in hospital room?: 2  Climbing 3-5 steps with a railing?: 1  Basic Mobility Total Score: 11       Therapeutic Activities and Exercises:  Educated pt on PT role/POC  Educated pt on importance of OOB activity   Pt verbalized understanding    Sit to stand x 3 reps to increase B LE strength and to improve standing tolerance   B UE TE with OT    Patient left HOB elevated with all lines intact, call button in reach and RN present..    GOALS:   Multidisciplinary Problems     Physical Therapy Goals        Problem: Physical Therapy Goal    Goal Priority Disciplines Outcome Goal Variances Interventions   Physical Therapy Goal     PT, PT/OT Ongoing, Progressing     Description:  Goals to be met by: 20    Patient will increase functional independence with mobility by performin. Supine to sit with Contact Guard Assistance -not met  2. Sit to stand transfer with Contact Guard Assistance -not met  3. Gait  x 150 feet with Contact Guard Assistance -not met  4. Ascend/descend 5 stair with left Handrails Contact Guard Assistance  -not met                Multidisciplinary Problems (Resolved)        Problem: Physical Therapy Goal    Goal Priority Disciplines Outcome Goal Variances Interventions   Physical Therapy Goal   (Resolved)     PT, PT/OT Met     Description:  Eval only                    Time Tracking:     PT Received On: 01/23/20  PT Start Time: 0954     PT Stop Time: 1025  PT Total Time (min): 31 min     Billable Minutes: Therapeutic Activity 23    Treatment Type: Treatment  PT/PTA: PT     PTA Visit Number: 0     Tiana Jain PT, DPT  1/23/2020  [phone]

## 2024-03-08 NOTE — PROGRESS NOTES
3rd attempt for [facility] Call; Left voicemail. Please call [phone] please leave first and last name and  for Annette. We will return your call.       2021

## 2024-03-11 RX ORDER — INSULIN GLARGINE 100 [IU]/ML
8 INJECTION, SOLUTION SUBCUTANEOUS NIGHTLY
Qty: 7.2 ML | Refills: 0 | Status: ON HOLD | OUTPATIENT
Start: 2024-03-11 | End: 2024-05-20 | Stop reason: HOSPADM

## 2024-03-22 ENCOUNTER — HOSPITAL ENCOUNTER (OUTPATIENT)
Facility: HOSPITAL | Age: 59
Discharge: HOME | End: 2024-03-23
Attending: STUDENT IN AN ORGANIZED HEALTH CARE EDUCATION/TRAINING PROGRAM | Admitting: STUDENT IN AN ORGANIZED HEALTH CARE EDUCATION/TRAINING PROGRAM
Payer: MEDICARE

## 2024-03-22 DIAGNOSIS — F01.C0 SEVERE VASCULAR DEMENTIA, UNSPECIFIED WHETHER BEHAVIORAL, PSYCHOTIC, OR MOOD DISTURBANCE OR ANXIETY: ICD-10-CM

## 2024-03-22 DIAGNOSIS — R94.31 QT PROLONGATION: Primary | ICD-10-CM

## 2024-03-22 DIAGNOSIS — R07.9 CHEST PAIN: ICD-10-CM

## 2024-03-22 DIAGNOSIS — R41.82 AMS (ALTERED MENTAL STATUS): ICD-10-CM

## 2024-03-22 LAB
ALBUMIN SERPL BCP-MCNC: 2.8 G/DL (ref 3.5–5.2)
ALLENS TEST: ABNORMAL
ALP SERPL-CCNC: 242 U/L (ref 55–135)
ALT SERPL W/O P-5'-P-CCNC: 15 U/L (ref 10–44)
AMMONIA PLAS-SCNC: 24 UMOL/L (ref 10–50)
ANION GAP SERPL CALC-SCNC: 12 MMOL/L (ref 8–16)
APAP SERPL-MCNC: <3 UG/ML (ref 10–20)
AST SERPL-CCNC: 36 U/L (ref 10–40)
BASOPHILS # BLD AUTO: 0.06 K/UL (ref 0–0.2)
BASOPHILS NFR BLD: 1.2 % (ref 0–1.9)
BILIRUB SERPL-MCNC: 1.3 MG/DL (ref 0.1–1)
BUN SERPL-MCNC: 15 MG/DL (ref 6–20)
CALCIUM SERPL-MCNC: 9.2 MG/DL (ref 8.7–10.5)
CHLORIDE SERPL-SCNC: 97 MMOL/L (ref 95–110)
CO2 SERPL-SCNC: 25 MMOL/L (ref 23–29)
CREAT SERPL-MCNC: 3.4 MG/DL (ref 0.5–1.4)
DIFFERENTIAL METHOD BLD: ABNORMAL
EOSINOPHIL # BLD AUTO: 0.1 K/UL (ref 0–0.5)
EOSINOPHIL NFR BLD: 2.7 % (ref 0–8)
ERYTHROCYTE [DISTWIDTH] IN BLOOD BY AUTOMATED COUNT: 16.9 % (ref 11.5–14.5)
EST. GFR  (NO RACE VARIABLE): 15.1 ML/MIN/1.73 M^2
GLUCOSE SERPL-MCNC: 122 MG/DL (ref 70–110)
HCO3 UR-SCNC: 29.1 MMOL/L (ref 24–28)
HCT VFR BLD AUTO: 40.4 % (ref 37–48.5)
HGB BLD-MCNC: 12.9 G/DL (ref 12–16)
IMM GRANULOCYTES # BLD AUTO: 0.02 K/UL (ref 0–0.04)
IMM GRANULOCYTES NFR BLD AUTO: 0.4 % (ref 0–0.5)
LYMPHOCYTES # BLD AUTO: 1 K/UL (ref 1–4.8)
LYMPHOCYTES NFR BLD: 21.3 % (ref 18–48)
MAGNESIUM SERPL-MCNC: 2 MG/DL (ref 1.6–2.6)
MCH RBC QN AUTO: 31.4 PG (ref 27–31)
MCHC RBC AUTO-ENTMCNC: 31.9 G/DL (ref 32–36)
MCV RBC AUTO: 98 FL (ref 82–98)
MONOCYTES # BLD AUTO: 0.8 K/UL (ref 0.3–1)
MONOCYTES NFR BLD: 15.6 % (ref 4–15)
NEUTROPHILS # BLD AUTO: 2.9 K/UL (ref 1.8–7.7)
NEUTROPHILS NFR BLD: 58.8 % (ref 38–73)
NRBC BLD-RTO: 0 /100 WBC
PCO2 BLDA: 32.4 MMHG (ref 35–45)
PH SMN: 7.56 [PH] (ref 7.35–7.45)
PHOSPHATE SERPL-MCNC: 2.8 MG/DL (ref 2.7–4.5)
PLATELET # BLD AUTO: 221 K/UL (ref 150–450)
PMV BLD AUTO: 11.1 FL (ref 9.2–12.9)
PO2 BLDA: 59 MMHG (ref 40–60)
POC BE: 7 MMOL/L
POC SATURATED O2: 94 % (ref 95–100)
POC TCO2: 30 MMOL/L (ref 24–29)
POCT GLUCOSE: 122 MG/DL (ref 70–110)
POCT GLUCOSE: 135 MG/DL (ref 70–110)
POTASSIUM SERPL-SCNC: 3.6 MMOL/L (ref 3.5–5.1)
PROT SERPL-MCNC: 9 G/DL (ref 6–8.4)
RBC # BLD AUTO: 4.11 M/UL (ref 4–5.4)
SALICYLATES SERPL-MCNC: <5 MG/DL (ref 15–30)
SAMPLE: ABNORMAL
SITE: ABNORMAL
SODIUM SERPL-SCNC: 134 MMOL/L (ref 136–145)
WBC # BLD AUTO: 4.88 K/UL (ref 3.9–12.7)

## 2024-03-22 PROCEDURE — 99900035 HC TECH TIME PER 15 MIN (STAT): Mod: HCNC

## 2024-03-22 PROCEDURE — G0378 HOSPITAL OBSERVATION PER HR: HCPCS | Mod: HCNC

## 2024-03-22 PROCEDURE — 80143 DRUG ASSAY ACETAMINOPHEN: CPT | Mod: HCNC | Performed by: STUDENT IN AN ORGANIZED HEALTH CARE EDUCATION/TRAINING PROGRAM

## 2024-03-22 PROCEDURE — 84100 ASSAY OF PHOSPHORUS: CPT | Mod: HCNC | Performed by: EMERGENCY MEDICINE

## 2024-03-22 PROCEDURE — 80179 DRUG ASSAY SALICYLATE: CPT | Mod: HCNC | Performed by: STUDENT IN AN ORGANIZED HEALTH CARE EDUCATION/TRAINING PROGRAM

## 2024-03-22 PROCEDURE — 82140 ASSAY OF AMMONIA: CPT | Mod: HCNC | Performed by: STUDENT IN AN ORGANIZED HEALTH CARE EDUCATION/TRAINING PROGRAM

## 2024-03-22 PROCEDURE — 99285 EMERGENCY DEPT VISIT HI MDM: CPT | Mod: 25,HCNC

## 2024-03-22 PROCEDURE — 82962 GLUCOSE BLOOD TEST: CPT | Mod: HCNC

## 2024-03-22 PROCEDURE — 83735 ASSAY OF MAGNESIUM: CPT | Mod: HCNC | Performed by: EMERGENCY MEDICINE

## 2024-03-22 PROCEDURE — 85025 COMPLETE CBC W/AUTO DIFF WBC: CPT | Mod: HCNC | Performed by: EMERGENCY MEDICINE

## 2024-03-22 PROCEDURE — 93005 ELECTROCARDIOGRAM TRACING: CPT | Mod: HCNC

## 2024-03-22 PROCEDURE — 82803 BLOOD GASES ANY COMBINATION: CPT | Mod: HCNC

## 2024-03-22 PROCEDURE — 80053 COMPREHEN METABOLIC PANEL: CPT | Mod: HCNC | Performed by: EMERGENCY MEDICINE

## 2024-03-22 PROCEDURE — 93010 ELECTROCARDIOGRAM REPORT: CPT | Mod: HCNC,,, | Performed by: INTERNAL MEDICINE

## 2024-03-22 RX ORDER — TALC
6 POWDER (GRAM) TOPICAL NIGHTLY PRN
Status: CANCELLED | OUTPATIENT
Start: 2024-03-22

## 2024-03-22 RX ORDER — IBUPROFEN 200 MG
24 TABLET ORAL
Status: DISCONTINUED | OUTPATIENT
Start: 2024-03-22 | End: 2024-03-23 | Stop reason: HOSPADM

## 2024-03-22 RX ORDER — SEVELAMER CARBONATE 800 MG/1
1600 TABLET, FILM COATED ORAL
Status: DISCONTINUED | OUTPATIENT
Start: 2024-03-23 | End: 2024-03-23 | Stop reason: HOSPADM

## 2024-03-22 RX ORDER — ACETAMINOPHEN 325 MG/1
650 TABLET ORAL EVERY 8 HOURS PRN
Status: DISCONTINUED | OUTPATIENT
Start: 2024-03-22 | End: 2024-03-23 | Stop reason: HOSPADM

## 2024-03-22 RX ORDER — FUROSEMIDE 40 MG/1
40 TABLET ORAL 2 TIMES DAILY
Status: DISCONTINUED | OUTPATIENT
Start: 2024-03-22 | End: 2024-03-23 | Stop reason: HOSPADM

## 2024-03-22 RX ORDER — SODIUM CHLORIDE 0.9 % (FLUSH) 0.9 %
1-10 SYRINGE (ML) INJECTION EVERY 12 HOURS PRN
Status: DISCONTINUED | OUTPATIENT
Start: 2024-03-22 | End: 2024-03-23 | Stop reason: HOSPADM

## 2024-03-22 RX ORDER — POLYETHYLENE GLYCOL 3350 17 G/17G
17 POWDER, FOR SOLUTION ORAL DAILY PRN
Status: DISCONTINUED | OUTPATIENT
Start: 2024-03-22 | End: 2024-03-23 | Stop reason: HOSPADM

## 2024-03-22 RX ORDER — SERTRALINE HYDROCHLORIDE 100 MG/1
100 TABLET, FILM COATED ORAL DAILY
Status: DISCONTINUED | OUTPATIENT
Start: 2024-03-23 | End: 2024-03-23 | Stop reason: HOSPADM

## 2024-03-22 RX ORDER — ACETAMINOPHEN 325 MG/1
650 TABLET ORAL EVERY 4 HOURS PRN
Status: DISCONTINUED | OUTPATIENT
Start: 2024-03-22 | End: 2024-03-23 | Stop reason: HOSPADM

## 2024-03-22 RX ORDER — NALOXONE HCL 0.4 MG/ML
0.02 VIAL (ML) INJECTION
Status: DISCONTINUED | OUTPATIENT
Start: 2024-03-22 | End: 2024-03-23 | Stop reason: HOSPADM

## 2024-03-22 RX ORDER — ISOSORBIDE DINITRATE 20 MG/1
40 TABLET ORAL 3 TIMES DAILY
Status: DISCONTINUED | OUTPATIENT
Start: 2024-03-23 | End: 2024-03-23 | Stop reason: HOSPADM

## 2024-03-22 RX ORDER — TALC
6 POWDER (GRAM) TOPICAL NIGHTLY PRN
Status: DISCONTINUED | OUTPATIENT
Start: 2024-03-22 | End: 2024-03-23 | Stop reason: HOSPADM

## 2024-03-22 RX ORDER — SODIUM CHLORIDE 0.9 % (FLUSH) 0.9 %
10 SYRINGE (ML) INJECTION
Status: CANCELLED | OUTPATIENT
Start: 2024-03-22

## 2024-03-22 RX ORDER — ATORVASTATIN CALCIUM 40 MG/1
80 TABLET, FILM COATED ORAL DAILY
Status: DISCONTINUED | OUTPATIENT
Start: 2024-03-23 | End: 2024-03-23 | Stop reason: HOSPADM

## 2024-03-22 RX ORDER — CLOPIDOGREL BISULFATE 75 MG/1
75 TABLET ORAL DAILY
Status: DISCONTINUED | OUTPATIENT
Start: 2024-03-23 | End: 2024-03-23 | Stop reason: HOSPADM

## 2024-03-22 RX ORDER — HYDRALAZINE HYDROCHLORIDE 50 MG/1
50 TABLET, FILM COATED ORAL 3 TIMES DAILY
Status: DISCONTINUED | OUTPATIENT
Start: 2024-03-23 | End: 2024-03-23 | Stop reason: HOSPADM

## 2024-03-22 RX ORDER — SIMETHICONE 80 MG
1 TABLET,CHEWABLE ORAL 4 TIMES DAILY PRN
Status: DISCONTINUED | OUTPATIENT
Start: 2024-03-22 | End: 2024-03-23 | Stop reason: HOSPADM

## 2024-03-22 RX ORDER — SODIUM BICARBONATE 650 MG/1
650 TABLET ORAL 2 TIMES DAILY
Status: DISCONTINUED | OUTPATIENT
Start: 2024-03-23 | End: 2024-03-23 | Stop reason: HOSPADM

## 2024-03-22 RX ORDER — CALCIUM ACETATE 667 MG/1
667 CAPSULE ORAL
Status: DISCONTINUED | OUTPATIENT
Start: 2024-03-23 | End: 2024-03-23 | Stop reason: HOSPADM

## 2024-03-22 RX ORDER — ONDANSETRON 8 MG/1
8 TABLET, ORALLY DISINTEGRATING ORAL EVERY 8 HOURS PRN
Status: DISCONTINUED | OUTPATIENT
Start: 2024-03-22 | End: 2024-03-22

## 2024-03-22 RX ORDER — CALCITRIOL 0.25 UG/1
0.5 CAPSULE ORAL DAILY
Status: DISCONTINUED | OUTPATIENT
Start: 2024-03-23 | End: 2024-03-23 | Stop reason: HOSPADM

## 2024-03-22 RX ORDER — CARVEDILOL 6.25 MG/1
6.25 TABLET ORAL 2 TIMES DAILY
Status: DISCONTINUED | OUTPATIENT
Start: 2024-03-23 | End: 2024-03-23 | Stop reason: HOSPADM

## 2024-03-22 RX ORDER — IBUPROFEN 200 MG
16 TABLET ORAL
Status: DISCONTINUED | OUTPATIENT
Start: 2024-03-22 | End: 2024-03-23 | Stop reason: HOSPADM

## 2024-03-22 RX ORDER — GLUCAGON 1 MG
1 KIT INJECTION
Status: DISCONTINUED | OUTPATIENT
Start: 2024-03-22 | End: 2024-03-23 | Stop reason: HOSPADM

## 2024-03-22 RX ORDER — ALUMINUM HYDROXIDE, MAGNESIUM HYDROXIDE, AND SIMETHICONE 1200; 120; 1200 MG/30ML; MG/30ML; MG/30ML
30 SUSPENSION ORAL 4 TIMES DAILY PRN
Status: DISCONTINUED | OUTPATIENT
Start: 2024-03-22 | End: 2024-03-23 | Stop reason: HOSPADM

## 2024-03-22 NOTE — ED NOTES
Bed: [facility]  Expected date:   Expected time:   Means of arrival:   Comments:  Godwin

## 2024-03-23 VITALS
OXYGEN SATURATION: 95 % | SYSTOLIC BLOOD PRESSURE: 161 MMHG | TEMPERATURE: 98 F | HEART RATE: 90 BPM | WEIGHT: 158.31 LBS | BODY MASS INDEX: 36.64 KG/M2 | RESPIRATION RATE: 16 BRPM | DIASTOLIC BLOOD PRESSURE: 90 MMHG | HEIGHT: 55 IN

## 2024-03-23 LAB
ALBUMIN SERPL BCP-MCNC: 2.5 G/DL (ref 3.5–5.2)
ALP SERPL-CCNC: 211 U/L (ref 55–135)
ALT SERPL W/O P-5'-P-CCNC: 12 U/L (ref 10–44)
ANION GAP SERPL CALC-SCNC: 11 MMOL/L (ref 8–16)
AST SERPL-CCNC: 23 U/L (ref 10–40)
BASOPHILS # BLD AUTO: 0.07 K/UL (ref 0–0.2)
BASOPHILS NFR BLD: 1.6 % (ref 0–1.9)
BILIRUB SERPL-MCNC: 1.1 MG/DL (ref 0.1–1)
BUN SERPL-MCNC: 19 MG/DL (ref 6–20)
CALCIUM SERPL-MCNC: 9.2 MG/DL (ref 8.7–10.5)
CHLORIDE SERPL-SCNC: 100 MMOL/L (ref 95–110)
CO2 SERPL-SCNC: 24 MMOL/L (ref 23–29)
CREAT SERPL-MCNC: 3.6 MG/DL (ref 0.5–1.4)
DIFFERENTIAL METHOD BLD: ABNORMAL
EOSINOPHIL # BLD AUTO: 0.1 K/UL (ref 0–0.5)
EOSINOPHIL NFR BLD: 2.9 % (ref 0–8)
ERYTHROCYTE [DISTWIDTH] IN BLOOD BY AUTOMATED COUNT: 16.7 % (ref 11.5–14.5)
EST. GFR  (NO RACE VARIABLE): 14.1 ML/MIN/1.73 M^2
GLUCOSE SERPL-MCNC: 119 MG/DL (ref 70–110)
HCT VFR BLD AUTO: 39.6 % (ref 37–48.5)
HGB BLD-MCNC: 12.3 G/DL (ref 12–16)
IMM GRANULOCYTES # BLD AUTO: 0.01 K/UL (ref 0–0.04)
IMM GRANULOCYTES NFR BLD AUTO: 0.2 % (ref 0–0.5)
LYMPHOCYTES # BLD AUTO: 1.1 K/UL (ref 1–4.8)
LYMPHOCYTES NFR BLD: 24.3 % (ref 18–48)
MAGNESIUM SERPL-MCNC: 1.9 MG/DL (ref 1.6–2.6)
MCH RBC QN AUTO: 31.8 PG (ref 27–31)
MCHC RBC AUTO-ENTMCNC: 31.1 G/DL (ref 32–36)
MCV RBC AUTO: 102 FL (ref 82–98)
MONOCYTES # BLD AUTO: 0.7 K/UL (ref 0.3–1)
MONOCYTES NFR BLD: 15.6 % (ref 4–15)
NEUTROPHILS # BLD AUTO: 2.5 K/UL (ref 1.8–7.7)
NEUTROPHILS NFR BLD: 55.4 % (ref 38–73)
NRBC BLD-RTO: 0 /100 WBC
OHS QRS DURATION: 90 MS
OHS QTC CALCULATION: 519 MS
PHOSPHATE SERPL-MCNC: 3.3 MG/DL (ref 2.7–4.5)
PLATELET # BLD AUTO: 194 K/UL (ref 150–450)
PMV BLD AUTO: 11.4 FL (ref 9.2–12.9)
POCT GLUCOSE: 111 MG/DL (ref 70–110)
POCT GLUCOSE: 143 MG/DL (ref 70–110)
POTASSIUM SERPL-SCNC: 3.6 MMOL/L (ref 3.5–5.1)
PROT SERPL-MCNC: 7.6 G/DL (ref 6–8.4)
RBC # BLD AUTO: 3.87 M/UL (ref 4–5.4)
SODIUM SERPL-SCNC: 135 MMOL/L (ref 136–145)
WBC # BLD AUTO: 4.49 K/UL (ref 3.9–12.7)

## 2024-03-23 PROCEDURE — 84100 ASSAY OF PHOSPHORUS: CPT | Mod: HCNC | Performed by: STUDENT IN AN ORGANIZED HEALTH CARE EDUCATION/TRAINING PROGRAM

## 2024-03-23 PROCEDURE — 97165 OT EVAL LOW COMPLEX 30 MIN: CPT | Mod: HCNC

## 2024-03-23 PROCEDURE — G0378 HOSPITAL OBSERVATION PER HR: HCPCS | Mod: HCNC

## 2024-03-23 PROCEDURE — 25000003 PHARM REV CODE 250: Mod: HCNC | Performed by: STUDENT IN AN ORGANIZED HEALTH CARE EDUCATION/TRAINING PROGRAM

## 2024-03-23 PROCEDURE — 83735 ASSAY OF MAGNESIUM: CPT | Mod: HCNC | Performed by: STUDENT IN AN ORGANIZED HEALTH CARE EDUCATION/TRAINING PROGRAM

## 2024-03-23 PROCEDURE — 97112 NEUROMUSCULAR REEDUCATION: CPT | Mod: HCNC

## 2024-03-23 PROCEDURE — 97161 PT EVAL LOW COMPLEX 20 MIN: CPT | Mod: HCNC

## 2024-03-23 PROCEDURE — 97535 SELF CARE MNGMENT TRAINING: CPT | Mod: HCNC

## 2024-03-23 PROCEDURE — 85025 COMPLETE CBC W/AUTO DIFF WBC: CPT | Mod: HCNC | Performed by: STUDENT IN AN ORGANIZED HEALTH CARE EDUCATION/TRAINING PROGRAM

## 2024-03-23 PROCEDURE — 36415 COLL VENOUS BLD VENIPUNCTURE: CPT | Mod: HCNC | Performed by: STUDENT IN AN ORGANIZED HEALTH CARE EDUCATION/TRAINING PROGRAM

## 2024-03-23 PROCEDURE — 80053 COMPREHEN METABOLIC PANEL: CPT | Mod: HCNC | Performed by: STUDENT IN AN ORGANIZED HEALTH CARE EDUCATION/TRAINING PROGRAM

## 2024-03-23 PROCEDURE — 97530 THERAPEUTIC ACTIVITIES: CPT | Mod: HCNC

## 2024-03-23 RX ORDER — BLOOD-GLUCOSE CONTROL, NORMAL
EACH MISCELLANEOUS 2 TIMES DAILY
Qty: 100 EACH | Refills: 0 | Status: ON HOLD | OUTPATIENT
Start: 2024-03-23 | End: 2024-05-20 | Stop reason: HOSPADM

## 2024-03-23 RX ADMIN — HYDRALAZINE HYDROCHLORIDE 50 MG: 50 TABLET ORAL at 09:03

## 2024-03-23 RX ADMIN — ISOSORBIDE DINITRATE 40 MG: 20 TABLET ORAL at 09:03

## 2024-03-23 RX ADMIN — CARVEDILOL 6.25 MG: 6.25 TABLET, FILM COATED ORAL at 09:03

## 2024-03-23 RX ADMIN — FUROSEMIDE 40 MG: 40 TABLET ORAL at 09:03

## 2024-03-23 RX ADMIN — CALCITRIOL CAPSULES 0.25 MCG 0.5 MCG: 0.25 CAPSULE ORAL at 09:03

## 2024-03-23 RX ADMIN — SERTRALINE 100 MG: 100 TABLET, FILM COATED ORAL at 09:03

## 2024-03-23 RX ADMIN — CLOPIDOGREL BISULFATE 75 MG: 75 TABLET ORAL at 09:03

## 2024-03-23 RX ADMIN — ATORVASTATIN CALCIUM 80 MG: 40 TABLET, FILM COATED ORAL at 09:03

## 2024-03-23 RX ADMIN — FUROSEMIDE 40 MG: 40 TABLET ORAL at 01:03

## 2024-03-23 RX ADMIN — SODIUM BICARBONATE 650 MG TABLET 650 MG: at 09:03

## 2024-03-23 RX ADMIN — CALCIUM ACETATE 667 MG: 667 CAPSULE ORAL at 09:03

## 2024-03-23 RX ADMIN — ALLOPURINOL 50 MG: 300 TABLET ORAL at 09:03

## 2024-03-23 RX ADMIN — SEVELAMER CARBONATE 1600 MG: 800 TABLET, FILM COATED ORAL at 09:03

## 2024-03-23 RX ADMIN — APIXABAN 5 MG: 5 TABLET, FILM COATED ORAL at 09:03

## 2024-03-23 NOTE — PT/OT/SLP EVAL
Occupational Therapy   Co-Evaluation/Treatment     Name: Suyapa Connelly  MRN: [id number]  Admitting Diagnosis: Debility  Recent Surgery: * No surgery found *      Recommendations:     Discharge Recommendations:    Discharge Equipment Recommendations:  none  Barriers to discharge:  None    Assessment:     Suyapa Connelly is a 58 y.o. female with a medical diagnosis of Debility.  She presents with the following performance deficits affecting function: impaired balance, impaired endurance, weakness, impaired self care skills, impaired functional mobility, decreased lower extremity function, decreased upper extremity function, decreased coordination, impaired cognition, impaired coordination, impaired fine motor.      Pt agreeable to therapy and tolerated the session well this date. She was able to transitioned to sitting EOB with SBA. Pt performed oral care while EOB and required Min A due to cognitive deficits. Pt attempted to use finger and toothpaste as her toothbrush but eventually able to complete. Pt with digit contractures in R hand limiting participation, but she was still able to grasp. Pt performed lateral and anterior/posterior scoots with SBA-Mod A and Pt mostly limited by difficulty processing the commands/directions. Pt would benefit from continued skilled acute OT services during this admission in order to maximize independence and safety with ADLs and functional mobility to ensure safe return to PLOF in the least restrictive environment. Patient currently demonstrates a need for low intensity therapy post acute for increased independence in ADLs and functional mobility.     Rehab Prognosis: Good; patient would benefit from acute skilled OT services to address these deficits and reach maximum level of function.       Plan:     Patient to be seen 3 x/week to address the above listed problems via self-care/home management, therapeutic activities, therapeutic exercises, neuromuscular re-education  Plan of  Care Expires: 04/23/24  Plan of Care Reviewed with: patient, spouse    Subjective     Chief Complaint: Weakness   Patient/Family Comments/goals: To return to PLOF    Occupational Profile:  Living Environment: Pt lives with her  in a H with no NASIR.    Previous level of function: In the last year, Pt has increasingly required assistance with all transfers and all ADLs. Pt's  performs the transfers and also uses a jany lift for dialysis.   Equipment Used at Home: bath bench, wheelchair, lift device, grab bar  Assistance upon Discharge: Pt will have 24/7 assistance from her      Pain/Comfort:  Pain Rating 1: 0/10  Pain Rating Post-Intervention 1: 0/10    Patients cultural, spiritual, Holiness conflicts given the current situation: yes    Objective:     Co-evaluation/treatment performed due to patient's multiple deficits requiring two skilled therapists to appropriately and safely assess patient's strength and endurance while facilitating functional tasks in addition to accommodating for patient's activity tolerance.     Communicated with: RN prior to session.  Patient found HOB elevated with PureWick, telemetry upon OT entry to room.    General Precautions: Standard, fall  Orthopedic Precautions: N/A  Braces: N/A  Respiratory Status: Room air    Occupational Performance:    Bed Mobility:    Patient completed Rolling/Turning to Left with  stand by assistance  Patient completed Scooting/Bridging with stand by assistance and moderate assistance  Patient completed Supine to Sit with stand by assistance  Patient completed Sit to Supine with stand by assistance  Pt sat EOB with SBA ranging to Min A during dynamic tasks     Functional Mobility/Transfers:  Pt performed scooting with SBA ranging to Mod A   Scooting to the R with SBA   Anterior/posterior scooting with SBA   Scooting to the L with Mod A due to difficulty with command following/orientation     Activities of Daily Living:  Grooming: minimum  assistance : oral care while EOB and required Min A due to cognitive deficits. Pt attempted to use finger and toothpaste as her toothbrush but eventually able to complete. Pt with digit contractures in R hand limiting participation, but she was still able to grasp.     Cognitive/Visual Perceptual:  Cognitive/Psychosocial Skills:     -       Oriented to: Person, Place, and Situation   -       Follows Commands/attention:Follows one-step commands  -       Safety awareness/insight to disability: impaired   -       Mood/Affect/Coping skills/emotional control: Appropriate to situation  Visual/Perceptual:      -Intact visual field     Physical Exam:  Balance:  Static Sitting   stand by assistance   Dynamic Sitting   minimum assistance   Static Standing    N/A   Dynamic Standing    N/A     Upper Extremity Function:   Dominance: Right   Left UE Right UE   UE Edema None noted None noted   UE ROM WFL WFL   UE Strength 4/5 grossly  4-/5 grossly     Strength WFL WFL   Sensation    -       Intact    -       Intact   Fine Motor Skills:     -       Intact  Left hand thumb/finger opposition skills and Left hand, manipulation of objects    -       Impaired  Right hand thumb/finger opposition skills fair and Right hand, manipulation of objects fair - limited by contractures in digits    Gross Motor Skills:   WFL   hemiplegia/paresis         AMPAC 6 Click ADL:  AMPAC Total Score: 15    Treatment & Education:  Therapist provided facilitation and instruction of proper body mechanics and fall prevention strategies during tasks listed above.  Instructed patient to sit in bedside chair daily to increase OOB/activity tolerance.  Instructed patient to use call light to have nursing staff assist with needs/transfers.  Discussed OT POC and answered all questions within OT scope of practice.  Whiteboard updated       Patient left HOB elevated with all lines intact, call button in reach, and RN and   present    GOALS:    Multidisciplinary Problems       Occupational Therapy Goals          Problem: Occupational Therapy    Goal Priority Disciplines Outcome Interventions   Occupational Therapy Goal     OT, PT/OT Ongoing, Progressing    Description: Goals to be met by: 4/6/24     Patient will increase functional independence with ADLs by performing:    UE Dressing with Supervision.  LE Dressing with Moderate Assistance.  Grooming while seated with Stand-by Assistance.  Toileting from bedside commode with Minimal Assistance for hygiene and clothing management.   Toilet transfer to bedside commode with Moderate Assistance and drop arm commode.  Pt will perform scoot transfers with SBA laterally and forwards/backwards                          History:     Past Medical History:   Diagnosis Date    Anxiety     Chronic pain syndrome     CKD (chronic kidney disease), stage III     Depression     Diabetes mellitus     type 2    Diabetes mellitus, type 2     GERD (gastroesophageal reflux disease)     Hyperemesis 3/23/2021    Hypokalemia 3/23/2021    Infection of below knee amputation stump 3/12/2022    Osteomyelitis     Osteomyelitis of left foot 4/30/2021    Ulcer of left foot     Vaginal delivery     x1         Past Surgical History:   Procedure Laterality Date    ABOVE-KNEE AMPUTATION Left 5/18/2021    Procedure: AMPUTATION, ABOVE KNEE;  Surgeon: Teddy Huber MD;  Location: [address];  Service: Vascular;  Laterality: Left;    ABOVE-KNEE AMPUTATION Right 3/18/2022    Procedure: AMPUTATION, ABOVE KNEE;  Surgeon: DAYNE Florez II, MD;  Location: [facility] OR [address] [place] [address];  Service: Vascular;  Laterality: Right;    Angiogram - Right Extremity Right 7/9/15    angiogram-left leg  10/6/15    ANGIOGRAPHY OF LOWER EXTREMITY Left 4/29/2021    Procedure: ANGIOGRAM, LOWER EXTREMITY;  Surgeon: Teddy Huber MD;  Location: [address];  Service: Vascular;  Laterality: Left;    BELOW KNEE AMPUTATION OF LOWER EXTREMITY Right 12/28/2021     Procedure: AMPUTATION, BELOW KNEE;  Surgeon: Kaitlyn Rojas MD;  Location: [facility] OR;  Service: General;  Laterality: Right;    CATHETERIZATION OF BOTH LEFT AND RIGHT HEART N/A 12/18/2019    Procedure: CATHETERIZATION, HEART, BOTH LEFT AND RIGHT;  Surgeon: Que Fernando III, MD;  Location: [facility] CATH LAB;  Service: Cardiology;  Laterality: N/A;    CORONARY ANGIOGRAPHY N/A 12/18/2019    Procedure: ANGIOGRAM, CORONARY ARTERY;  Surgeon: Que Fernando III, MD;  Location: [facility] CATH LAB;  Service: Cardiology;  Laterality: N/A;    CORONARY ANGIOGRAPHY INCLUDING BYPASS GRAFTS WITH CATHETERIZATION OF LEFT HEART N/A 7/28/2020    Procedure: ANGIOGRAM, CORONARY, INCLUDING BYPASS GRAFT, WITH LEFT HEART CATHETERIZATION, 9 am;  Surgeon: Rachel Easley MD;  Location: [facility] CATH LAB;  Service: Cardiology;  Laterality: N/A;    CORONARY ARTERY BYPASS GRAFT (CABG) N/A 1/14/2020    Procedure: CORONARY ARTERY BYPASS GRAFT (CABG) x 1     Off Pump;  Surgeon: Huang Altamirano MD;  Location: [address];  Service: Cardiovascular;  Laterality: N/A;    CREATION OF FEMORAL-TIBIAL ARTERY BYPASS Left 4/29/2021    Procedure: CREATION, BYPASS, ARTERIAL, FEMORAL TO ANTERIOR TIBIAL;  Surgeon: Teddy Huber MD;  Location: [address];  Service: Vascular;  Laterality: Left;    CREATION OF FEMOROPOPLITEAL ARTERIAL BYPASS USING GRAFT Left 8/18/2020    Procedure: CREATION, BYPASS, ARTERIAL, FEMORAL TO POPLITEAL, USING GRAFT, LEFT LOWER EXTREMITY;  Surgeon: Teddy Huber MD;  Location: [facility];  Service: Vascular;  Laterality: Left;  REQUEST 7:15 A.M. START----COVID NEGATIVE ON 8/17  1ST CASE STARTKENYA DUEÑAS ON 8/7/2020 @ 942AM-LO  RN PREOP 8/12/2020   T/S-----CLEARED BY CARDS-------PENDING INSURANCE    DEBRIDEMENT OF FOOT Left 9/8/2020    Procedure: DEBRIDEMENT, FOOT;  Surgeon: Rosio Mayes DPM;  Location: [facility];  Service: Podiatry;  Laterality: Left;  request neoxx .   RN Pre Op 9-4-2020, Covid negative on  9/5/20. C A    DEBRIDEMENT OF FOOT  3/4/2021    Procedure: DEBRIDEMENT, FOOT;  Surgeon: Teddy Huber MD;  Location: [facility] OR;  Service: Vascular;;    DEBRIDEMENT OF FOOT Left 3/9/2021    Procedure: DEBRIDEMENT, FOOT, bone biopsy;  Surgeon: Rosio Mayes DPM;  Location: [facility] OR;  Service: Podiatry;  Laterality: Left;  Request neoxx---COVID IN AM  REQUESTING NOON START  RN Phone Pre op.On Blood thinners Plavix and Eliquis.  Covid am of surgery. C A    DEBRIDEMENT OF FOOT Left 5/4/2021    Procedure: DEBRIDEMENT, FOOT;  Surgeon: Farooq Morley DPM;  [facility];  Service: Podiatry;  Laterality: Left;    INSERTION OF TUNNELED CENTRAL VENOUS HEMODIALYSIS CATHETER N/A 1/27/2020    Procedure: Insertion, Catheter, Central Venous, Hemodialysis;  Surgeon: ESTEBAN Gomez III, MD;  Location: [facility] CATH LAB;  Service: Peripheral Vascular;  Laterality: N/A;    INSERTION OF TUNNELED CENTRAL VENOUS HEMODIALYSIS CATHETER  5/10/2023    Procedure: Insertion, Catheter, Central Venous, Hemodialysis;  Surgeon: Romulo Queen MD;  Location: [facility] CATH LAB;  Service: Cardiology;;    PERCUTANEOUS TRANSLUMINAL ANGIOPLASTY N/A 3/4/2021    Procedure: PTA (ANGIOPLASTY, PERCUTANEOUS, TRANSLUMINAL);  Surgeon: Teddy uHber MD;  Location: [facility] OR;  Service: Vascular;  Laterality: N/A;    REMOVAL OF ARTERIOVENOUS GRAFT Left 5/27/2021    Procedure: REMOVAL, GRAFT, LEFT LOWER EXTREMITY, WOUND EXPLORATION;  Surgeon: Teddy Huber MD;  Location: [facility] 2ND FLR;  Service: Vascular;  Laterality: Left;    REMOVAL OF NAIL OF DIGIT Left 3/9/2021    Procedure: REMOVAL, NAIL, DIGIT;  Surgeon: Rosio Mayes DPM;  Location: [facility] OR;  Service: Podiatry;  Laterality: Left;    RIGHT HEART CATHETERIZATION Right 5/10/2023    Procedure: INSERTION, CATHETER, RIGHT HEART;  Surgeon: Romulo Queen MD;  Location: [facility] CATH LAB;  Service: Cardiology;  Laterality: Right;    THROMBECTOMY Left 3/4/2021    Procedure:  THROMBECTOMY, LEFT LOWER EXTREMITY BYPASS GRAFT, ANGIOGRAM, POSSIBLE INTERVENTION, POSSIBLE LEFT LOWER EXTREMITY BYPASS;  Surgeon: Teddy Huber MD;  Location: [facility] OR;  Service: Vascular;  Laterality: Left;    THROMBECTOMY Left 4/29/2021    Procedure: GRAFT THROMBECTOMY, LEFT LOWER EXTREMITY;  Surgeon: Teddy Huber MD;  [facility];  Service: Vascular;  Laterality: Left;  14.5 min  1179.85 mGy  341.01 Gycm2  240 ml dye    THROMBECTOMY  10/22/2021    Procedure: THROMBECTOMY;  Surgeon: Saad Arenas MD;  Location: [facility] CATH LAB/EP;  Service: Cardiology;;       Time Tracking:     OT Date of Treatment: 03/23/24  OT Start Time: 0848  OT Stop Time: 0922  OT Total Time (min): 34 min    Billable Minutes:Evaluation 10  Self Care/Home Management 14  Therapeutic Activity 10    3/23/2024

## 2024-03-23 NOTE — ED TRIAGE NOTES
Suyapa Connelly, a 58 y.o. female presents to the ED w/ complaint of AMS for the past couple of months, sent over from dialysis to be seen.     Triage note:  Chief Complaint   Patient presents with    Altered Mental Status     Arrives via EMS, sent over from the dialysis clinic for worsening confusion,  states the confusion has been going on for months, did receive dialysis first but then sent over patient to be evaluated for Altered mental status, currently AAOx2,      Review of patient's allergies indicates:   Allergen Reactions    Ciprofloxacin Itching    Contrast media      Kidney injury    Iodine      Kidney injury    Pcn [penicillins]      Rash; tolerated ceftriaxone on 1/13/20     Past Medical History:   Diagnosis Date    Anxiety     Chronic pain syndrome     CKD (chronic kidney disease), stage III     Depression     Diabetes mellitus     type 2    Diabetes mellitus, type 2     GERD (gastroesophageal reflux disease)     Hyperemesis 3/23/2021    Hypokalemia 3/23/2021    Infection of below knee amputation stump 3/12/2022    Osteomyelitis     Osteomyelitis of left foot 4/30/2021    Ulcer of left foot     Vaginal delivery     x1        APPEARANCE: awake and alert in NAD.  SKIN: warm, dry and intact. +bruising noted to both arms,   MUSCULOSKELETAL: Patient moving all extremities spontaneously, no obvious swelling or deformities noted. +BKA  RESPIRATORY: Denies shortness of breath.Respirations unlabored.   CARDIAC: Denies CP, 2+ distal pulses; no peripheral edema  ABDOMEN: S/ND/NT, Denies nausea  : voids spontaneously, denies difficulty  Neurologic: AAO x 2; follows commands equal strength in all extremities; denies numbness/tingling. Denies dizziness

## 2024-03-23 NOTE — HOSPITAL COURSE
Pt [place] [facility] and remained stable overnight and into 3/23/2024. Evaluated by PT/OT, who recommended HH. Extensive discussions held with pt and  at bedside regarding prognosis and need for HH services;  not ready to look for NH placement at this time. Referral sent to memory disorders clinic. Pt deemed appropriate for discharge; seen and examined prior to departure. Plan discussed with pt, who was agreeable and amenable; medications were discussed and reviewed, outpatient follow-up scheduled, ER precautions were given, all questions were answered to the pt's satisfaction, and Mrs. Connelly was subsequently discharged.

## 2024-03-23 NOTE — PT/OT/SLP EVAL
Physical Therapy Co-Evaluation and Co-Treatment    Patient Name:  Suyapa Connelly   MRN:  [id number]  Admit Date: 3/22/2024  Admitting Diagnosis:  Debility   Length of Stay: 0 days  Recent Surgery: * No surgery found *      Recommendations:     Discharge Recommendations: Low Intensity Therapy  Discharge Equipment Recommendations: none   Barriers to discharge: None    Appropriate transfer level with nursing staff: sitting EOB with SBA    Plan:     During this hospitalization, patient to be seen 3 x/week to address the identified rehab impairments via gait training, therapeutic activities, therapeutic exercises, neuromuscular re-education and progress towards the established goals.  Plan of Care Expires:  04/22/24  Plan of Care Reviewed with: patient, spouse    Assessment     Suyapa Connelly is a 58 y.o. female admitted with a medical diagnosis of Debility. Pt tolerated evaluation fairly today. She presented to [facility]  On 3/22 due to worsening confusion and weakness. PMH significant for bilateral AKA. She presents as alert and oriented to person and place, disoriented to time and situation. She demo's excellent core strength upon evaluation and was able to accept internal and external perturbations appropriately with no LOB and good balance strategies. She does demo some Rt sided weakness of RUE and RLE which translated into difficulty scooting to the Lt requiring Mod A. Pt did demo increased processing time for one or two step instructions as well as other cognitive deficits including increased difficulty with dual task activities as she required Min A to maintain sitting balance while performing ADL activities. Per  at bedside she has experienced a functional and cognitive decline which has worsened drastically since November but reports ability to care for pt at this time with adequate equipment. Pt will continue to benefit from skilled PT services during this hospital admit to continue to improve transfer  "ability and efficiency as well as continue to progress pt's ambulation distance and cardiopulmonary endurance to maximize pt's functional independence and return to PLOF.    Problem List: impaired endurance, impaired self care skills, impaired functional mobility, impaired balance, impaired cognition, decreased upper extremity function, decreased lower extremity function, decreased safety awareness, pain, impaired coordination, weakness.  Rehab Prognosis: Good; patient would benefit from acute skilled PT services to address these deficits and reach maximum level of function.      Goals:   Multidisciplinary Problems       Physical Therapy Goals          Problem: Physical Therapy    Goal Priority Disciplines Outcome Goal Variances Interventions   Physical Therapy Goal     PT, PT/OT Ongoing, Progressing     Description: Goals to be met by: 2024     Patient will increase functional independence with mobility by performin. Supine to sit with [place] with bed flat  2. Sit to supine with [place] with bed flat  3. Rolling to Left and Right with [place].  4. Bed to wheelchair transfer with Minimal Assistance using slideboard as needed  5. Wheelchair propulsion x50 feet with Supervision using bilateral uppper extremities                         Subjective     RN notified prior to session.  present upon PT entrance into room. Patient agreeable to PT evaluation.    Chief Complaint: Altered Mental Status (Arrives via EMS, sent over from the dialysis clinic for worsening confusion,  states the confusion has been going on for months, did receive dialysis first but then sent over patient to be evaluated for Altered mental status, currently AAOx2, )  Patient/Family Comments/goals: pt tearful at bedside stating "I know some thing happened to me why won't they tell me what is going on"  Pain/Comfort:  Pain Rating 1: 0/10  Pain Rating Post-Intervention 1: 0/10    Social History:  Residence: " Patient lives with their spouse in a single story house with number of outside stair(s): 0 .  Equipment Owned (not using): bath bench, wheelchair, lift device, grab bar  Equipment Used:  grab bar, bath bench, wheelchair, and lift device  Prior level of function:  Prior to admission, patient completed transfers via scoot transfers to wheelchair with total assistance using assist from  . She has required increasing assist to perform ADLs. Pt reports she has required increasing assistance over the last year and used to be able to transfer self to/from wheelchair. Pt utilizes a jany lift for dialysis  Managing Medicines/Managing Home: no.   Assistance Upon Discharge: significant other    Objective:     Additional staff present: OT; OT for co-evaluation due to suspected patient need for two skilled therapists to appropriately assess patient's functional deficits as well as ensure patient safety, accommodate for limited activity tolerance, and provide appropriate, skilled assistance to maximize functional potential during evaluation.    Patient found HOB elevated with: telemetry, PureWick     General Precautions: Standard, Cardiac fall   Orthopedic Precautions:N/A   Braces: N/A   Body mass index is 48.3 kg/m².  Oxygen Device: Room Air  Vitals: BP (!) 161/90 (BP Location: Right arm, Patient Position: Lying)   Pulse 90   Temp 98.1 °F (36.7 °C) (Tympanic)   Resp 16   Ht 4' (1.219 m)   Wt 71.8 kg (158 lb 4.6 oz)   LMP 09/30/2015 (Exact Date)   SpO2 95%   BMI 48.30 kg/m²     Exams:  Cognition:   Alert, Cooperative, and Labile   Patient is oriented to Person, Place, Not oriented to time, Not oriented to situation  Command following: Follows two-step verbal commands and requires increased time for processing  Fluency: clear/fluent  Hearing: Intact  Vision:  Intact  Skin Integrity: Visible skin intact  Postural Assessment: no deviations noted  Physical Exam:    Left UE Left LE Right UE Right LE   Edema absent  absent absent absent   ROM AROM WFL AROM WFL AROM WFL AROM WFL   Strength adequate ROM, adequate strength Hip flex/abd 4/5 adequate ROM, adequate strength Hip flex/abd 4-/5   Sensation intact to light touch intact to light touch intact to light touch intact to light touch   Coordination normal normal abnormal normal     Outcome Measures:  AM-PAC 6 CLICK MOBILITY  Turning over in bed (including adjusting bedclothes, sheets and blankets)?: 3  Sitting down on and standing up from a chair with arms (e.g., wheelchair, bedside commode, etc.): 1  Moving from lying on back to sitting on the side of the bed?: 3  Moving to and from a bed to a chair (including a wheelchair)?: 2  Need to walk in hospital room?: 1  Climbing 3-5 steps with a railing?: 1  Basic Mobility Total Score: 11     Functional Mobility:    Bed Mobility:   Rolling/Turning to Left: stand by assistance and with side rail   Scooting posteriorly to HOB in long sitting: stand by assistance   Supine to Sit: stand by assistance with HOB elevated; to Lt side of bed  Scooting anteriorly to EOB to have both feet planted on floor: stand by assistance   Scooting laterally to the Lt: moderate assistance   Scooting laterally to the Rt: stand by assistance   Sit to Supine: stand by assistance with bed flat; to Lt side of bed    Sitting Balance at Edge of Bed:  Static Sitting Balance: Good : able to maintain balance against moderate resistance  Dynamic Sitting Balance: Fair- : able to reach ipsilaterally but unable to weight shift  Assistance Level Required: Stand-by Assistance to Minimal Assistance  Time: 15 minutes  Postural deviations noted: no deviations noted  Comments: Time EOB focused primarily on tolerance to upright positioning, cardiopulmonary response and endurance for activities, and strength of postural musculature to perform dynamic sitting to prepare for tasks in the home. Pt able to accept internal and external perturbations to balance while seated EOB with  appropriate trunk response to remain upright with no LOB while performing reaches with 1 UE inside and outside MARIO. However pt with decline in sitting balance when presented with dual task of performing ADLs while maintaining sitting balance. Pt required Mod A to maintain balance with initiation of ADLs and also demo'ed difficulty performing task with delayed processing/performance of ADL task (attempting to use finger as toothbrush, trying to brush teeth with toothpaste tube)     Education:  Time provided for education, counseling and discussion of health disposition in regards to patient's current status  All questions answered within PT scope of practice and to patient's satisfaction  PT role in POC to address current functional deficits  Pt educated on proper body mechanics, safety techniques, and energy conservation with PT facilitation and cueing throughout session  Pt, pt's , and RN educated on pt is safe to sit EOB with SBA with nsg or  present    Patient left HOB elevated with all lines intact, call button in reach, and RN and  present.    History:     Past Medical History:   Diagnosis Date    Anxiety     Chronic pain syndrome     CKD (chronic kidney disease), stage III     Depression     Diabetes mellitus     type 2    Diabetes mellitus, type 2     GERD (gastroesophageal reflux disease)     Hyperemesis 3/23/2021    Hypokalemia 3/23/2021    Infection of below knee amputation stump 3/12/2022    Osteomyelitis     Osteomyelitis of left foot 4/30/2021    Ulcer of left foot     Vaginal delivery     x1       Past Surgical History:   Procedure Laterality Date    ABOVE-KNEE AMPUTATION Left 5/18/2021    Procedure: AMPUTATION, ABOVE KNEE;  Surgeon: Teddy Huber MD;  Location: [facility] OR [address] [place] [address];  Service: Vascular;  Laterality: Left;    ABOVE-KNEE AMPUTATION Right 3/18/2022    Procedure: AMPUTATION, ABOVE KNEE;  Surgeon: DAYNE Florez II, MD;  Location: [facility] OR [address] [place] [address];  Service:  Vascular;  Laterality: Right;    Angiogram - Right Extremity Right 7/9/15    angiogram-left leg  10/6/15    ANGIOGRAPHY OF LOWER EXTREMITY Left 4/29/2021    Procedure: ANGIOGRAM, LOWER EXTREMITY;  Surgeon: Teddy Huber MD;  Location: [address];  Service: Vascular;  Laterality: Left;    BELOW KNEE AMPUTATION OF LOWER EXTREMITY Right 12/28/2021    Procedure: AMPUTATION, BELOW KNEE;  Surgeon: Kaitlyn Rojas MD;  Location: [facility];  Service: General;  Laterality: Right;    CATHETERIZATION OF BOTH LEFT AND RIGHT HEART N/A 12/18/2019    Procedure: CATHETERIZATION, HEART, BOTH LEFT AND RIGHT;  Surgeon: Que Fernando III, MD;  Location: [facility]placed [facility] CATH LAB;  Service: Cardiology;  Laterality: N/A;    CORONARY ANGIOGRAPHY N/A 12/18/2019    Procedure: ANGIOGRAM, CORONARY ARTERY;  Surgeon: Que Fernando III, MD;  Location: Replaced by [facility] CATH LAB;  Service: Cardiology;  Laterality: N/A;    CORONARY ANGIOGRAPHY INCLUDING BYPASS GRAFTS WITH CATHETERIZATION OF LEFT HEART N/A 7/28/2020    Procedure: ANGIOGRAM, CORONARY, INCLUDING BYPASS GRAFT, WITH LEFT HEART CATHETERIZATION, 9 am;  Surgeon: Rachel Easley MD;  Location: [facility] CATH LAB;  Service: Cardiology;  Laterality: N/A;    CORONARY ARTERY BYPASS GRAFT (CABG) N/A 1/14/2020    Procedure: CORONARY ARTERY BYPASS GRAFT (CABG) x 1     Off Pump;  Surgeon: Huang Altamirano MD;  Location: [address];  Service: Cardiovascular;  Laterality: N/A;    CREATION OF FEMORAL-TIBIAL ARTERY BYPASS Left 4/29/2021    Procedure: CREATION, BYPASS, ARTERIAL, FEMORAL TO ANTERIOR TIBIAL;  Surgeon: Teddy Huber MD;  Location: [address];  Service: Vascular;  Laterality: Left;    CREATION OF FEMOROPOPLITEAL ARTERIAL BYPASS USING GRAFT Left 8/18/2020    Procedure: CREATION, BYPASS, ARTERIAL, FEMORAL TO POPLITEAL, USING GRAFT, LEFT LOWER EXTREMITY;  Surgeon: Teddy Huber MD;  Location: [facility];  Service: Vascular;  Laterality: Left;  REQUEST 7:15 A.M.  START----COVID NEGATIVE ON 8/17  1ST CASE STARTE PER LEANA ON 8/7/2020 @ 942AM-LO  RN PREOP 8/12/2020   T/S-----CLEARED BY CARDS-------PENDING INSURANCE    DEBRIDEMENT OF FOOT Left 9/8/2020    Procedure: DEBRIDEMENT, FOOT;  Surgeon: Rosio Mayes DPM;  Location: [facility] OR;  Service: Podiatry;  Laterality: Left;  request neoxx .   RN Pre Op 9-4-2020, Covid negative on 9/5/20. C A    DEBRIDEMENT OF FOOT  3/4/2021    Procedure: DEBRIDEMENT, FOOT;  Surgeon: Teddy Huber MD;  Location: [facility] OR;  Service: Vascular;;    DEBRIDEMENT OF FOOT Left 3/9/2021    Procedure: DEBRIDEMENT, FOOT, bone biopsy;  Surgeon: Rosio Mayes DPM;  Location: [facility] OR;  Service: Podiatry;  Laterality: Left;  Request neoxx---COVID IN AM  REQUESTING NOON START  RN Phone Pre op.On Blood thinners Plavix and Eliquis.  Covid am of surgery. C A    DEBRIDEMENT OF FOOT Left 5/4/2021    Procedure: DEBRIDEMENT, FOOT;  Surgeon: Farooq Morley DPM;  Location: [facility] OR 2ND FLR;  Service: Podiatry;  Laterality: Left;    INSERTION OF TUNNELED CENTRAL VENOUS HEMODIALYSIS CATHETER N/A 1/27/2020    Procedure: Insertion, Catheter, Central Venous, Hemodialysis;  Surgeon: ESTEBAN Gomez III, MD;  Location: [facility] CATH LAB;  Service: Peripheral Vascular;  Laterality: N/A;    INSERTION OF TUNNELED CENTRAL VENOUS HEMODIALYSIS CATHETER  5/10/2023    Procedure: Insertion, Catheter, Central Venous, Hemodialysis;  Surgeon: Romulo Queen MD;  Location: [facility] CATH LAB;  Service: Cardiology;;    PERCUTANEOUS TRANSLUMINAL ANGIOPLASTY N/A 3/4/2021    Procedure: PTA (ANGIOPLASTY, PERCUTANEOUS, TRANSLUMINAL);  Surgeon: Teddy Huber MD;  Location: [facility] OR;  Service: Vascular;  Laterality: N/A;    REMOVAL OF ARTERIOVENOUS GRAFT Left 5/27/2021    Procedure: REMOVAL, GRAFT, LEFT LOWER EXTREMITY, WOUND EXPLORATION;  Surgeon: Teddy Huber MD;  Location: [facility] OR 2ND FLR;  Service: Vascular;  Laterality: Left;    REMOVAL OF NAIL OF DIGIT Left 3/9/2021     Procedure: REMOVAL, NAIL, DIGIT;  Surgeon: Rosio Mayes DPM;  Location: [facility] OR;  Service: Podiatry;  Laterality: Left;    RIGHT HEART CATHETERIZATION Right 5/10/2023    Procedure: INSERTION, CATHETER, RIGHT HEART;  Surgeon: Romulo Queen MD;  Location: [facility] CATH LAB;  Service: Cardiology;  Laterality: Right;    THROMBECTOMY Left 3/4/2021    Procedure: THROMBECTOMY, LEFT LOWER EXTREMITY BYPASS GRAFT, ANGIOGRAM, POSSIBLE INTERVENTION, POSSIBLE LEFT LOWER EXTREMITY BYPASS;  Surgeon: Teddy Huber MD;  Location: [facility] OR;  Service: Vascular;  Laterality: Left;    THROMBECTOMY Left 4/29/2021    Procedure: GRAFT THROMBECTOMY, LEFT LOWER EXTREMITY;  Surgeon: Teddy Huber MD;  Location: [facility] OR [facility] FLR;  Service: Vascular;  Laterality: Left;  14.5 min  1179.85 mGy  341.01 Gycm2  240 ml dye    THROMBECTOMY  10/22/2021    Procedure: THROMBECTOMY;  Surgeon: Saad Arenas MD;  Location: [facility] CATH LAB/EP;  Service: Cardiology;;       Family History   Problem Relation Age of Onset    Diabetes Mother     Diabetes Father     Heart disease Maternal Grandmother     No Known Problems Maternal Grandfather     Diabetes Paternal Grandmother     No Known Problems Paternal Grandfather     Anesthesia problems Neg Hx        Social History     Socioeconomic History    Marital status:    Occupational History    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      Social Determinants of Health     Financial Resource Strain: Low Risk  (2/6/2024)    Overall Financial Resource Strain (CARDIA)     Difficulty of Paying Living Expenses: Not very hard   Food Insecurity: No Food Insecurity (2/6/2024)    Hunger Vital Sign     Worried About Running Out of Food in the  Last Year: Never true     Ran Out of Food in the Last Year: Never true   Transportation Needs: No Transportation Needs (2/6/2024)    PRAPARE - Transportation     Lack of Transportation (Medical): No     Lack of Transportation (Non-Medical): No   Physical Activity: Inactive (2/6/2024)    Exercise Vital Sign     Days of Exercise per Week: 0 days     Minutes of Exercise per Session: 0 min   Stress: No Stress Concern Present (2/6/2024)    [facility] [place] of Occupational Health - Occupational Stress Questionnaire     Feeling of Stress : Only a little   Recent Concern: Stress - Stress Concern Present (12/2/2023)    [facility] [place] of [employer] - Occupational Stress Questionnaire     Feeling of Stress : Rather much   Social Connections: Moderately Isolated (2/6/2024)    Social Connection and Isolation Panel [NHANES]     Frequency of Communication with Friends and Family: More than three times a week     Frequency of Social Gatherings with Friends and Family: More than three times a week     Attends [employer] Services: Never     Active Member of Clubs or Organizations: No     Attends Club or Organization Meetings: Never     Marital Status:    Housing Stability: Low Risk  (2/6/2024)    Housing Stability Vital Sign     Unable to Pay for Housing in the Last Year: No     Number of Places Lived in the Last Year: 1     Unstable Housing in the Last Year: No       Time Tracking:     PT Received On: 03/23/24  PT Start Time: 0848     PT Stop Time: 0921  PT Total Time (min): 33 min     Billable Minutes: Evaluation 10 minutes and Therapeutic Activity 23 minutes    3/23/2024

## 2024-03-23 NOTE — H&P
"[facility] - Observation [address] [place] [address] Medicine  History & Physical    Patient Name: Suyapa Connelly  MRN: [id number]  Patient Class: OP- Observation  Admission Date: 3/22/2024  Attending Physician: Hitesh Quijano MD   Primary Care Provider: Magen Christensen MD         Patient information was obtained from patient, spouse/SO, and past medical records.     Subjective:     Principal Problem:Debility    Chief Complaint:   Chief Complaint   Patient presents with    Altered Mental Status     Arrives via EMS, sent over from the dialysis clinic for worsening confusion,  states the confusion has been going on for months, did receive dialysis first but then sent over patient to be evaluated for Altered mental status, currently AAOx2,         HPI: Suyapa Connelly is a 58 y.o. female with severe PVD leading to bilateral LE amputations, debility, ESRD on HD, T2DM on insulin, CAD, AFIb, recurrent encephalopathy, obesity who presents today with cognitive and functional decline.     History is provided mainly by the patient's  as he wishes to tell about her decline.    He states that over the past year, she has had a progressive and recently rapid decline in her "cognitive stuff," which he describes as no longer being able to care for herself, feed herself, transfer herself, and with intermittent hallucinations.  He states that at her "baseline" (which she has not exhibited in over a year), she was a happy, outgoing, productive person, however she has not been this in quite some time.  They are frustrated that she has not been able to return to this baseline despite multiple hospitalizations and extensive workup.  They have no new complaints, however were sent here after dialysis today due to her generalized weakness which has been ongoing for over a year.    Past Medical History:   Diagnosis Date    Anxiety     Chronic pain syndrome     CKD (chronic kidney disease), stage III     Depression     Diabetes " mellitus     type 2    Diabetes mellitus, type 2     GERD (gastroesophageal reflux disease)     Hyperemesis 3/23/2021    Hypokalemia 3/23/2021    Infection of below knee amputation stump 3/12/2022    Osteomyelitis     Osteomyelitis of left foot 4/30/2021    Ulcer of left foot     Vaginal delivery     x1       Past Surgical History:   Procedure Laterality Date    ABOVE-KNEE AMPUTATION Left 5/18/2021    Procedure: AMPUTATION, ABOVE KNEE;  Surgeon: Teddy Huber MD;  Location: [facility] OR [address] [place] [address];  Service: Vascular;  Laterality: Left;    ABOVE-KNEE AMPUTATION Right 3/18/2022    Procedure: AMPUTATION, ABOVE KNEE;  Surgeon: DAYNE Florez II, MD;  [facility];  Service: Vascular;  Laterality: Right;    Angiogram - Right Extremity Right 7/9/15    angiogram-left leg  10/6/15    ANGIOGRAPHY OF LOWER EXTREMITY Left 4/29/2021    Procedure: ANGIOGRAM, LOWER EXTREMITY;  Surgeon: Teddy Huber MD;  Location: [facility] OR [address] [place] [address];  Service: Vascular;  Laterality: Left;    BELOW KNEE AMPUTATION OF LOWER EXTREMITY Right 12/28/2021    Procedure: AMPUTATION, BELOW KNEE;  Surgeon: Kaitlyn Rojas MD;  Location: [facility] OR;  Service: General;  Laterality: Right;    CATHETERIZATION OF BOTH LEFT AND RIGHT HEART N/A 12/18/2019    Procedure: CATHETERIZATION, HEART, BOTH LEFT AND RIGHT;  Surgeon: Que Fernando III, MD;  Location: [facility] CATH LAB;  Service: Cardiology;  Laterality: N/A;    CORONARY ANGIOGRAPHY N/A 12/18/2019    Procedure: ANGIOGRAM, CORONARY ARTERY;  Surgeon: Que Fernando III, MD;  Location: [facility] CATH LAB;  Service: Cardiology;  Laterality: N/A;    CORONARY ANGIOGRAPHY INCLUDING BYPASS GRAFTS WITH CATHETERIZATION OF LEFT HEART N/A 7/28/2020    Procedure: ANGIOGRAM, CORONARY, INCLUDING BYPASS GRAFT, WITH LEFT HEART CATHETERIZATION, 9 am;  Surgeon: Rachel Easley MD;  Location: [facility] CATH LAB;  Service: Cardiology;  Laterality: N/A;    CORONARY ARTERY BYPASS GRAFT (CABG) N/A 1/14/2020     Procedure: CORONARY ARTERY BYPASS GRAFT (CABG) x 1     Off Pump;  Surgeon: Huang Altamirano MD;  Location: [address];  Service: Cardiovascular;  Laterality: N/A;    CREATION OF FEMORAL-TIBIAL ARTERY BYPASS Left 4/29/2021    Procedure: CREATION, BYPASS, ARTERIAL, FEMORAL TO ANTERIOR TIBIAL;  Surgeon: Teddy Huber MD;  [facility];  Service: Vascular;  Laterality: Left;    CREATION OF FEMOROPOPLITEAL ARTERIAL BYPASS USING GRAFT Left 8/18/2020    Procedure: CREATION, BYPASS, ARTERIAL, FEMORAL TO POPLITEAL, USING GRAFT, LEFT LOWER EXTREMITY;  Surgeon: Teddy Huber MD;  Location: [facility];  Service: Vascular;  Laterality: Left;  REQUEST 7:15 A.M. START----COVID NEGATIVE ON 8/17 1ST CASE STARTKENYA PER LEANA ON 8/7/2020 @ 942AM-LO  RN PREOP 8/12/2020   T/S-----CLEARED BY CARDS-------PENDING INSURANCE    DEBRIDEMENT OF FOOT Left 9/8/2020    Procedure: DEBRIDEMENT, FOOT;  Surgeon: Rosio Mayes DPM;  Location: [facility];  Service: Podiatry;  Laterality: Left;  request neoxx .   RN Pre Op 9-4-2020, Covid negative on 9/5/20. C A    DEBRIDEMENT OF FOOT  3/4/2021    Procedure: DEBRIDEMENT, FOOT;  Surgeon: Teddy Huber MD;  Location: [facility];  Service: Vascular;;    DEBRIDEMENT OF FOOT Left 3/9/2021    Procedure: DEBRIDEMENT, FOOT, bone biopsy;  Surgeon: Rosio Mayes DPM;  Location: [facility] OR;  Service: Podiatry;  Laterality: Left;  Request neoxx---COVID IN AM  REQUESTING NOON START  RN Phone Pre op.On Blood thinners Plavix and Eliquis.  Covid am of surgery. C A    DEBRIDEMENT OF FOOT Left 5/4/2021    Procedure: DEBRIDEMENT, FOOT;  Surgeon: Farooq Morley DPM;  [address];  Service: Podiatry;  Laterality: Left;    INSERTION OF TUNNELED CENTRAL VENOUS HEMODIALYSIS CATHETER N/A 1/27/2020    Procedure: Insertion, Catheter, Central Venous, Hemodialysis;  Surgeon: ESTEBAN Gomez III, MD;  Location: [facility] CATH LAB;  Service: Peripheral Vascular;  Laterality: N/A;     INSERTION OF TUNNELED CENTRAL VENOUS HEMODIALYSIS CATHETER  5/10/2023    Procedure: Insertion, Catheter, Central Venous, Hemodialysis;  Surgeon: Romulo Queen MD;  Location: [facility] CATH LAB;  Service: Cardiology;;    PERCUTANEOUS TRANSLUMINAL ANGIOPLASTY N/A 3/4/2021    Procedure: PTA (ANGIOPLASTY, PERCUTANEOUS, TRANSLUMINAL);  Surgeon: Teddy Huber MD;  Location: [facility] OR;  Service: Vascular;  Laterality: N/A;    REMOVAL OF ARTERIOVENOUS GRAFT Left 5/27/2021    Procedure: REMOVAL, GRAFT, LEFT LOWER EXTREMITY, WOUND EXPLORATION;  Surgeon: Teddy Huber MD;  Location: [facility] OR 2ND FLR;  Service: Vascular;  Laterality: Left;    REMOVAL OF NAIL OF DIGIT Left 3/9/2021    Procedure: REMOVAL, NAIL, DIGIT;  Surgeon: Rosio Mayes DPM;  Location: [facility] OR;  Service: Podiatry;  Laterality: Left;    RIGHT HEART CATHETERIZATION Right 5/10/2023    Procedure: INSERTION, CATHETER, RIGHT HEART;  Surgeon: Romulo Queen MD;  Location: [facility] CATH LAB;  Service: Cardiology;  Laterality: Right;    THROMBECTOMY Left 3/4/2021    Procedure: THROMBECTOMY, LEFT LOWER EXTREMITY BYPASS GRAFT, ANGIOGRAM, POSSIBLE INTERVENTION, POSSIBLE LEFT LOWER EXTREMITY BYPASS;  Surgeon: Teddy Huber MD;  Location: [facility] OR;  Service: Vascular;  Laterality: Left;    THROMBECTOMY Left 4/29/2021    Procedure: GRAFT THROMBECTOMY, LEFT LOWER EXTREMITY;  Surgeon: Teddy Huber MD;  Location: [facility] OR 2ND FLR;  Service: Vascular;  Laterality: Left;  14.5 min  1179.85 mGy  341.01 Gycm2  240 ml dye    THROMBECTOMY  10/22/2021    Procedure: THROMBECTOMY;  Surgeon: Saad Arenas MD;  Location: [facility] CATH LAB/EP;  Service: Cardiology;;       Review of patient's allergies indicates:   Allergen Reactions    Ciprofloxacin Itching    Contrast media      Kidney injury    Iodine      Kidney injury    Pcn [penicillins]      Rash; tolerated ceftriaxone on 1/13/20       No current facility-administered medications on file prior to encounter.  "    Current Outpatient Medications on File Prior to Encounter   Medication Sig    allopurinoL (ZYLOPRIM) 100 MG tablet Take 0.5 tablets (50 mg total) by mouth every other day.    apixaban (ELIQUIS) 5 mg Tab Take 1 tablet (5 mg total) by mouth 2 (two) times daily.    atorvastatin (LIPITOR) 80 MG tablet Take 1 tablet (80 mg total) by mouth once daily.    blood sugar diagnostic Strp To check BG 3 times daily, to use with insurance preferred meter    blood-glucose meter Misc To check BG 3 times daily, to use with insurance preferred meter    calcitRIOL (ROCALTROL) 0.5 MCG Cap Take 1 capsule (0.5 mcg total) by mouth once daily.    calcium acetate,phosphat bind, (PHOSLO) 667 mg capsule Take 1 capsule (667 mg total) by mouth 3 (three) times daily with meals.    carvediloL (COREG) 6.25 MG tablet Take 1 tablet (6.25 mg total) by mouth 2 (two) times daily.    clopidogreL (PLAVIX) 75 mg tablet Take 1 tablet (75 mg total) by mouth once daily.    famotidine (PEPCID) 20 MG tablet Take 1 tablet (20 mg total) by mouth 2 (two) times daily.    furosemide (LASIX) 40 MG tablet Take 1 tablet (40 mg total) by mouth 2 (two) times a day.    hydrALAZINE (APRESOLINE) 50 MG tablet Take 1 tablet (50 mg total) by mouth 3 (three) times daily.    insulin (LANTUS SOLOSTAR U-100 INSULIN) glargine 100 units/mL SubQ pen Inject 8 Units into the skin every evening.    insulin aspart, niacinamide, (FIASP FLEXTOUCH U-100 INSULIN) 100 unit/mL (3 mL) InPn Inject 1-5 Units into the skin before meals as needed (Per sliding scale). Blood sugar 150 to 200 add 1 units.  Blood sugar 201 to 250 add 2 units.  Blood sugar 251 to 300 add 3 units  Blood sugar 301 to 350 add 4 units.  Blood sugar greater than 350 add 5 units.    isosorbide dinitrate (ISORDIL) 20 MG tablet Take 2 tablets (40 mg total) by mouth 3 (three) times daily.    lancets Misc To check BG 3 times daily, to use with insurance preferred meter    pen needle, diabetic 32 gauge x 5/32" Ndle 1 Units by " Misc.(Non-Drug; Combo Route) route before meals as needed (SSI).    sertraline (ZOLOFT) 100 MG tablet Take 1 tablet (100 mg total) by mouth once daily.    sevelamer carbonate (RENVELA) 800 mg Tab Take 2 tablets (1,600 mg total) by mouth 3 (three) times daily.    sodium bicarbonate 650 MG tablet TAKE 1 TABLET(650 MG) BY MOUTH THREE TIMES DAILY    traZODone (DESYREL) 50 MG tablet Take 0.5 tablets (25 mg total) by mouth every evening.    [DISCONTINUED] lancing device Misc 1 Device by Misc.(Non-Drug; Combo Route) route 2 (two) times daily with meals.     Family History       Problem Relation (Age of Onset)    Diabetes Mother, Father, Paternal Grandmother    Heart disease Maternal Grandmother    No Known Problems Maternal Grandfather, Paternal Grandfather          Tobacco Use    Smoking status: Former    Smokeless tobacco: Never   Substance and Sexual Activity    Alcohol use: No    Drug use: Yes     Types: Marijuana     Comment: occassional    Sexual activity: Yes     Partners: Male     Review of Systems   All other systems reviewed and are negative.    Objective:     Vital Signs (Most Recent):  Temp: 98 °F (36.7 °C) (03/23/24 0012)  Pulse: 86 (03/23/24 0012)  Resp: 19 (03/23/24 0012)  BP: (!) 172/81 (03/23/24 0012)  SpO2: (!) 94 % (03/23/24 0012) Vital Signs (24h Range):  Temp:  [97.9 °F (36.6 °C)-98 °F (36.7 °C)] 98 °F (36.7 °C)  Pulse:  [80-86] 86  Resp:  [16-19] 19  SpO2:  [94 %-96 %] 94 %  BP: (162-172)/(81-84) 172/81     Weight: 71.8 kg (158 lb 4.6 oz)  Body mass index is 48.3 kg/m².     Physical Exam  Vitals and nursing note reviewed.   Constitutional:       General: She is not in acute distress.     Appearance: She is well-developed. She is obese. She is ill-appearing (chronically). She is not diaphoretic.   HENT:      Head: Normocephalic and atraumatic.   Eyes:      General: No scleral icterus.     Conjunctiva/sclera: Conjunctivae normal.   Neck:      Vascular: No JVD.   Cardiovascular:      Rate and Rhythm:  Normal rate and regular rhythm.   Pulmonary:      Effort: Pulmonary effort is normal. No respiratory distress.      Breath sounds: No wheezing or rales.   Abdominal:      General: There is no distension.      Tenderness: There is no abdominal tenderness.   Genitourinary:     Comments: Purewick in place  Musculoskeletal:      Comments: BL LE amputee     Skin:     Coloration: Skin is not jaundiced or pale.   Neurological:      Mental Status: She is alert.      Motor: No abnormal muscle tone.      Comments: Patient is oriented to person, place, and somewhat situation, but is extremely tearful which limits conversation   Psychiatric:         Mood and Affect: Affect is tearful.                Significant Labs: All pertinent labs within the past 24 hours have been reviewed.  CBC:   Recent Labs   Lab 03/22/24  2108   WBC 4.88   HGB 12.9   HCT 40.4        CMP:   Recent Labs   Lab 03/22/24  2108   *   K 3.6   CL 97   CO2 25   *   BUN 15   CREATININE 3.4*   CALCIUM 9.2   PROT 9.0*   ALBUMIN 2.8*   BILITOT 1.3*   ALKPHOS 242*   AST 36   ALT 15   ANIONGAP 12       Significant Imaging: I have reviewed all pertinent imaging results/findings within the past 24 hours.  Assessment/Plan:     * Debility  Patient presents with a chronic reportedly cognitive and physical decline over the past year.  She has had multiple hospitalizations for the same and even a SNF stay without improvement.  She has had extensive workup without revealing reversible cause, and there is no acute pathology revealed on presentation here.    I had an extensive conversation with patient and her , who seems to have unrealistic expectations regarding her recovery.  I explained that she has a chronic decline and despite extensive workup, we have been unable to find an obvious and reversible etiology for her decline.  Suspect multifactorial etiology given her severe peripheral vascular disease, diabetes, and end-stage renal disease.  I  "explored his goals, which include keeping her at home as long as possible and ultimately getting her to travel and be "back to her baseline" (a year prior). I relayed realistic expectations of increasing her functional status with the help of home health and therapy, to which they are agreeable.  We will consult social work for assistance with arranging home health.    Severe obesity (BMI 35.0-39.9) with comorbidity  Body mass index is 48.3 kg/m². Morbid obesity complicates all aspects of disease management from diagnostic modalities to treatment.       ESRD (end stage renal disease)  Continue routine MWF HD and outpatient setting.    S/P AKA (above knee amputation) bilateral  This limits her functional status in his likely led to her decline.  PT/OT as above.      Chronic combined systolic and diastolic heart failure  No evidence of acute exacerbation.  Continue home medical management.      Paroxysmal atrial fibrillation  Continue home beta-blocker and Eliquis.    Anemia due to chronic kidney disease, on chronic dialysis  At baseline.  Monitor.    CAD (coronary artery disease)  Continue statin, Plavix, beta-blocker.    Type 2 diabetes mellitus with hyperglycemia, with long-term current use of insulin  Most recent A1c 9.7, indicating poor chronic control. Will hold home oral agents while inpatient.  Continue basal and bolus insulin regimen.  Titrate to maintain adequate glucose control. Diabetic diet ordered.     Lab Results   Component Value Date    HGBA1C 9.7 (H) 02/04/2024         Peripheral vascular disease  Severe.  Continue home statin and Plavix.        VTE Risk Mitigation (From admission, onward)           Ordered     apixaban tablet 5 mg  2 times daily         03/22/24 2256     IP VTE HIGH RISK PATIENT  Once         03/22/24 2256     Place sequential compression device  Until discontinued         03/22/24 2256     Reason for No Pharmacological VTE Prophylaxis  Once        Question:  Reasons:  Answer:  " Already adequately anticoagulated on oral Anticoagulants    03/22/24 2256                       On 03/23/2024, patient should be placed in hospital observation services under my care.             Abram Yanes MD  Department of Hospital Medicine  [facility] - Observation 11H

## 2024-03-23 NOTE — ED NOTES
Telemetry Verification   Patient placed on Telemetry Box  Verified with War Room  Box # [id number]   Monitor Tech War room   Rate 72   Rhythm NSR

## 2024-03-23 NOTE — PLAN OF CARE
[facility]      HOME HEALTH ORDERS  FACE TO FACE ENCOUNTER    Patient Name: Suyapa Connelly  YOB: 1965    PCP: Magen Christensen MD   PCP Address: [address] [address]  PCP Phone Number: [phone]  PCP Fax: [phone]    Encounter Date: 3/22/24    Admit to Home Health    Diagnoses:  Active Hospital Problems    Diagnosis  POA    *Debility [R53.81]  Yes    Severe obesity (BMI 35.0-39.9) with comorbidity [E66.01]  Yes    ESRD (end stage renal disease) [N18.6]  Yes    S/P AKA (above knee amputation) bilateral [Z89.611, Z89.612]  Not Applicable    Chronic combined systolic and diastolic heart failure [I50.42]  Yes    Paroxysmal atrial fibrillation [I48.0]  Yes    Anemia due to chronic kidney disease, on chronic dialysis [N18.6, D63.1, Z99.2]  Not Applicable    Type 2 diabetes mellitus with hyperglycemia, with long-term current use of insulin [E11.65, Z79.4]  Not Applicable    CAD (coronary artery disease) [I25.10]  Yes     Formatting of this note might be different from the original.  Last Assessment & Plan:   Formatting of this note might be different from the original.  -- Optimize glucose control.      Peripheral vascular disease [I73.9]  Yes      Resolved Hospital Problems   No resolved problems to display.       Follow Up Appointments:  Future Appointments   Date Time Provider Department Center   3/26/2024  9:30 AM Feliciano Nguyen III, PA-C [facility] FAM [facility]   4/15/2024  8:30 AM Sharri Sanon DNP, NP [facility]CR[facility] [place]       Allergies:  Review of patient's allergies indicates:   Allergen Reactions    Ciprofloxacin Itching    Contrast media      Kidney injury    Iodine      Kidney injury    Pcn [penicillins]      Rash; tolerated ceftriaxone on 1/13/20       Medications: Review discharge medications with patient and family and provide education.    Current Facility-Administered Medications   Medication Dose Route Frequency Provider Last  Rate Last Admin    acetaminophen tablet 650 mg  650 mg Oral Q8H PRN Abram Yanes MD        acetaminophen tablet 650 mg  650 mg Oral Q4H PRN Abram Yanes MD        allopurinol split tablet 50 mg  50 mg Oral Every other day Abram Yanes MD   50 mg at 03/23/24 0929    aluminum-magnesium hydroxide-simethicone 200-200-20 mg/5 mL suspension 30 mL  30 mL Oral QID PRN Abram Yanes MD        apixaban tablet 5 mg  5 mg Oral BID Abram Yanes MD   5 mg at 03/23/24 0929    atorvastatin tablet 80 mg  80 mg Oral Daily Abram Yanes MD   80 mg at 03/23/24 0929    calcitRIOL capsule 0.5 mcg  0.5 mcg Oral Daily Abram Yanes MD   0.5 mcg at 03/23/24 0929    calcium acetate(phosphat bind) capsule 667 mg  667 mg Oral TID  Abram Yanes MD   667 mg at 03/23/24 0929    carvediloL tablet 6.25 mg  6.25 mg Oral BID Abram Yanes MD   6.25 mg at 03/23/24 0929    clopidogreL tablet 75 mg  75 mg Oral Daily Abram Yanes MD   75 mg at 03/23/24 0930    furosemide tablet 40 mg  40 mg Oral BID Abram Yanes MD   40 mg at 03/23/24 0929    glucagon (human recombinant) injection 1 mg  1 mg Intramuscular PRN Abram Yanes MD        glucose chewable tablet 16 g  16 g Oral PRN Abram Yanes MD        glucose chewable tablet 24 g  24 g Oral PRN Abram Yanes MD        hydrALAZINE tablet 50 mg  50 mg Oral TID Abram Yanes MD   50 mg at 03/23/24 0929    insulin detemir U-100 (Levemir) pen 5 Units  5 Units Subcutaneous QHS Abram Yanes MD        isosorbide dinitrate tablet 40 mg  40 mg Oral TID Abram Yanes MD   40 mg at 03/23/24 0929    melatonin tablet 6 mg  6 mg Oral Nightly PRN Abram Yanes MD        naloxone 0.4 mg/mL injection 0.02 mg  0.02 mg Intravenous PRN Abram Yanes MD        polyethylene glycol packet 17 g  17 g Oral Daily PRN  Abram Yanes MD        sertraline tablet 100 mg  100 mg Oral Daily Abram Yanes MD   100 mg at 03/23/24 0929    sevelamer carbonate tablet 1,600 mg  1,600 mg Oral TID WM Abram Yanes MD   1,600 mg at 03/23/24 0929    simethicone chewable tablet 80 mg  1 tablet Oral QID PRN Abram Yanes MD        sodium bicarbonate tablet 650 mg  650 mg Oral BID Abram Yanes MD   650 mg at 03/23/24 0929    sodium chloride 0.9% flush 1-10 mL  1-10 mL Intravenous Q12H PRN Abram Yanes MD         Current Discharge Medication List        START taking these medications    Details   !! blood sugar diagnostic Strp 1 strip by Misc.(Non-Drug; Combo Route) route 2 (two) times a day.  Qty: 100 strip, Refills: 0      !! lancets (LANCETS,THIN) Misc 1 Lancet by Misc.(Non-Drug; Combo Route) route 2 (two) times a day.  Qty: 100 each, Refills: 0       !! - Potential duplicate medications found. Please discuss with provider.        CONTINUE these medications which have NOT CHANGED    Details   allopurinoL (ZYLOPRIM) 100 MG tablet Take 0.5 tablets (50 mg total) by mouth every other day.  Qty: 8 tablet, Refills: 11      apixaban (ELIQUIS) 5 mg Tab Take 1 tablet (5 mg total) by mouth 2 (two) times daily.  Qty: 60 tablet, Refills: 11      atorvastatin (LIPITOR) 80 MG tablet Take 1 tablet (80 mg total) by mouth once daily.  Qty: 90 tablet, Refills: 3    Associated Diagnoses: Coronary artery disease, unspecified vessel or lesion type, unspecified whether angina present, unspecified whether native or transplanted heart      !! blood sugar diagnostic Strp To check BG 3 times daily, to use with insurance preferred meter  Qty: 100 each, Refills: 3      blood-glucose meter Misc To check BG 3 times daily, to use with insurance preferred meter  Qty: 1 each, Refills: 0      calcitRIOL (ROCALTROL) 0.5 MCG Cap Take 1 capsule (0.5 mcg total) by mouth once daily.  Qty: 90 capsule, Refills: 3     "  calcium acetate,phosphat bind, (PHOSLO) 667 mg capsule Take 1 capsule (667 mg total) by mouth 3 (three) times daily with meals.  Qty: 90 capsule, Refills: 11      carvediloL (COREG) 6.25 MG tablet Take 1 tablet (6.25 mg total) by mouth 2 (two) times daily.  Qty: 180 tablet, Refills: 3    Comments: .      clopidogreL (PLAVIX) 75 mg tablet Take 1 tablet (75 mg total) by mouth once daily.  Qty: 90 tablet, Refills: 3    Associated Diagnoses: S/P CABG x 1      famotidine (PEPCID) 20 MG tablet Take 1 tablet (20 mg total) by mouth 2 (two) times daily.  Qty: 180 tablet, Refills: 3      furosemide (LASIX) 40 MG tablet Take 1 tablet (40 mg total) by mouth 2 (two) times a day.  Qty: 180 tablet, Refills: 1      hydrALAZINE (APRESOLINE) 50 MG tablet Take 1 tablet (50 mg total) by mouth 3 (three) times daily.  Qty: 270 tablet, Refills: 3    Comments: .      insulin (LANTUS SOLOSTAR U-100 INSULIN) glargine 100 units/mL SubQ pen Inject 8 Units into the skin every evening.  Qty: 7.2 mL, Refills: 0      insulin aspart, niacinamide, (FIASP FLEXTOUCH U-100 INSULIN) 100 unit/mL (3 mL) InPn Inject 1-5 Units into the skin before meals as needed (Per sliding scale). Blood sugar 150 to 200 add 1 units.  Blood sugar 201 to 250 add 2 units.  Blood sugar 251 to 300 add 3 units  Blood sugar 301 to 350 add 4 units.  Blood sugar greater than 350 add 5 units.  Qty: 3 pen , Refills: 3      isosorbide dinitrate (ISORDIL) 20 MG tablet Take 2 tablets (40 mg total) by mouth 3 (three) times daily.  Qty: 180 tablet, Refills: 11    Comments: MD dhillon's change to 20mg tabs via secure chat CCL      !! lancets Misc To check BG 3 times daily, to use with insurance preferred meter  Qty: 100 each, Refills: 3      pen needle, diabetic 32 gauge x 5/32" Ndle 1 Units by Misc.(Non-Drug; Combo Route) route before meals as needed (SSI).  Qty: 50 each, Refills: 3      sertraline (ZOLOFT) 100 MG tablet Take 1 tablet (100 mg total) by mouth once daily.  Qty: 90 tablet, " Refills: 3    Associated Diagnoses: CAROLIN (generalized anxiety disorder)      sevelamer carbonate (RENVELA) 800 mg Tab Take 2 tablets (1,600 mg total) by mouth 3 (three) times daily.  Qty: 270 tablet, Refills: 3      sodium bicarbonate 650 MG tablet TAKE 1 TABLET(650 MG) BY MOUTH THREE TIMES DAILY  Qty: 90 tablet, Refills: 0      traZODone (DESYREL) 50 MG tablet Take 0.5 tablets (25 mg total) by mouth every evening.  Qty: 15 tablet, Refills: 11       !! - Potential duplicate medications found. Please discuss with provider.            I have seen and examined this patient within the last 30 days. My clinical findings that support the need for the home health skilled services and home bound status are the following:no   Weakness/numbness causing balance and gait disturbance due to Weakness/Debility making it taxing to leave home.  Requiring assistive device to leave home due to unsteady gait caused by  Weakness/Debility.     Diet:   cardiac diet, diabetic diet 2000 calorie, and renal diet    Labs:  Report Lab results to PCP.    Referrals/ Consults  Physical Therapy to evaluate and treat. Evaluate for home safety and equipment needs; Establish/upgrade home exercise program. Perform / instruct on therapeutic exercises, gait training, transfer training, and Range of Motion.  Occupational Therapy to evaluate and treat. Evaluate home environment for safety and equipment needs. Perform/Instruct on transfers, ADL training, ROM, and therapeutic exercises.  Aide to provide assistance with personal care, ADLs, and vital signs.    Activities:   activity as tolerated    Nursing:   Agency to admit patient within 24 hours of hospital discharge unless specified on physician order or at patient request    SN to complete comprehensive assessment including routine vital signs. Instruct on disease process and s/s of complications to report to MD. Review/verify medication list sent home with the patient at time of discharge  and instruct  patient/caregiver as needed. Frequency may be adjusted depending on start of care date.     Skilled nurse to perform up to 3 visits PRN for symptoms related to diagnosis    Notify MD if SBP > 160 or < 90; DBP > 90 or < 50; HR > 120 or < 50; Temp > 101; O2 < 88%; Other:       Ok to schedule additional visits based on staff availability and patient request on consecutive days within the home health episode.    When multiple disciplines ordered:    Start of Care occurs on Sunday - Wednesday schedule remaining discipline evaluations as ordered on separate consecutive days following the start of care.    Thursday SOC -schedule subsequent evaluations Friday and Monday the following week.     Friday - Saturday SOC - schedule subsequent discipline evaluations on consecutive days starting Monday of the following week.    For all post-discharge communication and subsequent orders please contact patient's primary care physician.     Miscellaneous   Routine Skin for Bedridden Patients: Instruct patient/caregiver to apply moisture barrier cream to all skin folds and wet areas in perineal area daily and after baths and all bowel movements.  Diabetic Care:   SN to perform and educate Diabetic management with blood glucose monitoring:, Fingerstick blood sugar AC and HS, and Report CBG < 60 or > 350 to physician.    Home Health Aide:  Nursing Three times weekly, Physical Therapy Three times weekly, Occupational Therapy Three times weekly, and Home Health Aide Three times weekly    Wound Care Orders  no    I certify that this patient is confined to her home and needs intermittent skilled nursing care, physical therapy, and occupational therapy.        Hitesh Quijano MD  Attending Physician  Medical Director - [facility]  Department of Hospital Medicine  3/23/2024

## 2024-03-23 NOTE — ASSESSMENT & PLAN NOTE
"Patient presents with a chronic reportedly cognitive and physical decline over the past year.  She has had multiple hospitalizations for the same and even a SNF stay without improvement.  She has had extensive workup without revealing reversible cause, and there is no acute pathology revealed on presentation here.    I had an extensive conversation with patient and her , who seems to have unrealistic expectations regarding her recovery.  I explained that she has a chronic decline and despite extensive workup, we have been unable to find an obvious and reversible etiology for her decline.  Suspect multifactorial etiology given her severe peripheral vascular disease, diabetes, and end-stage renal disease.  I explored his goals, which include keeping her at home as long as possible and ultimately getting her to travel and be "back to her baseline" (a year prior). I relayed realistic expectations of increasing her functional status with the help of home health and therapy, to which they are agreeable.  We will consult social work for assistance with arranging home health.  "

## 2024-03-23 NOTE — DISCHARGE SUMMARY
"[facility] - Observation [address] [place] [address] Medicine  Discharge Summary      Patient Name: Suyapa Connelly  MRN: [id number]  CHAVEZ: [id number]  Patient Class: OP- Observation  Admission Date: 3/22/2024  Hospital Length of Stay: 0 days  Discharge Date and Time:  03/23/2024 11:23 AM  Attending Physician: Hitesh Quijano MD   Discharging Provider: Hitesh Quijano MD  Primary Care Provider: Magen Christensen MD  [facility] Medicine Team: [facility] Hitesh Quijano MD  Primary Care Team: [facility]    HPI:   Suyapa Connelly is a 58 y.o. female with severe PVD leading to bilateral LE amputations, debility, ESRD on HD, T2DM on insulin, CAD, AFIb, recurrent encephalopathy, obesity who presents today with cognitive and functional decline.     History is provided mainly by the patient's  as he wishes to tell about her decline.    He states that over the past year, she has had a progressive and recently rapid decline in her "cognitive stuff," which he describes as no longer being able to care for herself, feed herself, transfer herself, and with intermittent hallucinations.  He states that at her "baseline" (which she has not exhibited in over a year), she was a happy, outgoing, productive person, however she has not been this in quite some time.  They are frustrated that she has not been able to return to this baseline despite multiple hospitalizations and extensive workup.  They have no new complaints, however were sent here after dialysis today due to her generalized weakness which has been ongoing for over a year.    * No surgery found *      Hospital Course:   Pt admitted to [facility] and remained stable overnight and into 3/23/2024. Evaluated by PT/OT, who recommended HH. Extensive discussions held with pt and  at bedside regarding prognosis and need for HH services;  not ready to look for NH placement at this time. Referral sent to memory disorders clinic. Pt deemed appropriate for discharge; seen and " examined prior to departure. Plan discussed with pt, who was agreeable and amenable; medications were discussed and reviewed, outpatient follow-up scheduled, ER precautions were given, all questions were answered to the pt's satisfaction, and Mrs. Connelly was subsequently discharged.      Goals of Care Treatment Preferences:  Code Status: Full Code      Consults:   Consults (From admission, onward)          Status Ordering Provider     Inpatient consult to Nephrology  Once        Provider:  (Not yet assigned)    Acknowledged ELSA LINDER     Inpatient consult to Social Work  Once        Provider:  (Not yet assigned)    Acknowledged CARL GARCÍA            No new Assessment & Plan notes have been filed under this hospital service since the last note was generated.  Service: Hospital Medicine    Final Active Diagnoses:    Diagnosis Date Noted POA    PRINCIPAL PROBLEM:  Debility [R53.81] 02/12/2022 Yes    Severe obesity (BMI 35.0-39.9) with comorbidity [E66.01] 05/30/2023 Yes    ESRD (end stage renal disease) [N18.6] 04/21/2023 Yes    S/P AKA (above knee amputation) bilateral [Z89.611, Z89.612] 03/26/2022 Not Applicable    Chronic combined systolic and diastolic heart failure [I50.42] 06/21/2021 Yes    Paroxysmal atrial fibrillation [I48.0] 01/28/2020 Yes    Anemia due to chronic kidney disease, on chronic dialysis [N18.6, D63.1, Z99.2] 01/27/2020 Not Applicable    Type 2 diabetes mellitus with hyperglycemia, with long-term current use of insulin [E11.65, Z79.4] 01/02/2020 Not Applicable    CAD (coronary artery disease) [I25.10] 01/02/2020 Yes    Peripheral vascular disease [I73.9] 10/06/2015 Yes      Problems Resolved During this Admission:       Discharged Condition: stable    Disposition: Home or Self Care    Follow Up:   Follow-up Information       Magen Christensen MD. Schedule an appointment as soon as possible for a visit.    Specialty: Family Medicine  Contact information:  [address]  [facility] [address]  [phone]                           Patient Instructions:      BLOOD GLUCOSE MONITOR FOR HOME USE     Order Specific Question Answer Comments   Height: 4' (1.219 m)    Weight: 71.8 kg (158 lb 4.6 oz)    Length of need (1-99 months): 99      Ambulatory referral/consult to Neurology   Standing Status: Future   Referral Priority: Routine Referral Type: Consultation   Referral Reason: Specialty Services Required   Requested Specialty: Neurology   Number of Visits Requested: 1     Ambulatory referral/consult to Family Practice   Standing Status: Future   Referral Priority: Routine Referral Type: Consultation   Referral Reason: Specialty Services Required   Requested Specialty: Family Medicine   Number of Visits Requested: 1     ACCEPT - Ambulatory referral/consult to [facility] Failure [facility]   Standing Status: Future   Referral Priority: Routine Referral Type: Consultation   Referral Reason: Specialty Services Required   Requested Specialty: Cardiology   Number of Visits Requested: 1     Diet renal     Diet Cardiac     Diet diabetic     Notify your health care provider if you experience any of the following:  increased confusion or weakness     Notify your health care provider if you experience any of the following:  persistent dizziness, light-headedness, or visual disturbances     Notify your health care provider if you experience any of the following:  worsening rash     Notify your health care provider if you experience any of the following:  severe persistent headache     Notify your health care provider if you experience any of the following:  difficulty breathing or increased cough     Notify your health care provider if you experience any of the following:  severe uncontrolled pain     Notify your health care provider if you experience any of the following:  persistent nausea and vomiting or diarrhea     Notify your health care provider if you experience any of the  following:  temperature >100.4     Activity as tolerated       Significant Diagnostic Studies: Labs: All labs within the past 24 hours have been reviewed    Pending Diagnostic Studies:       Procedure Component Value Units Date/Time    Aluminum level [[id number]]     Order Status: Sent Lab Status: No result     Specimen: Blood            Medications:  Reconciled Home Medications:      Medication List        CHANGE how you take these medications      * ACCU-CHEK GUIDE TEST STRIPS Strp  Generic drug: blood sugar diagnostic  To check BG 3 times daily, to use with insurance preferred meter  What changed: Another medication with the same name was added. Make sure you understand how and when to take each.     * blood sugar diagnostic Strp  1 strip by Misc.(Non-Drug; Combo Route) route 2 (two) times a day.  What changed: You were already taking a medication with the same name, and this prescription was added. Make sure you understand how and when to take each.     * ACCU-CHEK SOFTCLIX LANCETS Misc  Generic drug: lancets  To check BG 3 times daily, to use with insurance preferred meter  What changed: Another medication with the same name was added. Make sure you understand how and when to take each.     * lancets Misc  Commonly known as: LANCETS,THIN  1 Lancet by Misc.(Non-Drug; Combo Route) route 2 (two) times a day.  What changed: You were already taking a medication with the same name, and this prescription was added. Make sure you understand how and when to take each.           * This list has 4 medication(s) that are the same as other medications prescribed for you. Read the directions carefully, and ask your doctor or other care provider to review them with you.                CONTINUE taking these medications      ACCU-CHEK GUIDE ME GLUCOSE MTR Misc  Generic drug: blood-glucose meter  To check BG 3 times daily, to use with insurance preferred meter     allopurinoL 100 MG tablet  Commonly known as: ZYLOPRIM  Take 0.5  "tablets (50 mg total) by mouth every other day.     apixaban 5 mg Tab  Commonly known as: ELIQUIS  Take 1 tablet (5 mg total) by mouth 2 (two) times daily.     atorvastatin 80 MG tablet  Commonly known as: LIPITOR  Take 1 tablet (80 mg total) by mouth once daily.     BD MIGUEL 2ND GEN PEN NEEDLE 32 gauge x 5/32" Ndle  Generic drug: pen needle, diabetic  1 Units by Misc.(Non-Drug; Combo Route) route before meals as needed (SSI).     calcitRIOL 0.5 MCG Cap  Commonly known as: ROCALTROL  Take 1 capsule (0.5 mcg total) by mouth once daily.     calcium acetate(phosphat bind) 667 mg capsule  Commonly known as: PHOSLO  Take 1 capsule (667 mg total) by mouth 3 (three) times daily with meals.     carvediloL 6.25 MG tablet  Commonly known as: COREG  Take 1 tablet (6.25 mg total) by mouth 2 (two) times daily.     clopidogreL 75 mg tablet  Commonly known as: PLAVIX  Take 1 tablet (75 mg total) by mouth once daily.     famotidine 20 MG tablet  Commonly known as: PEPCID  Take 1 tablet (20 mg total) by mouth 2 (two) times daily.     FIASP FLEXTOUCH U-100 INSULIN 100 unit/mL (3 mL) Inpn  Generic drug: insulin aspart (niacinamide)  Inject 1-5 Units into the skin before meals as needed (Per sliding scale). Blood sugar 150 to 200 add 1 units.  Blood sugar 201 to 250 add 2 units.  Blood sugar 251 to 300 add 3 units  Blood sugar 301 to 350 add 4 units.  Blood sugar greater than 350 add 5 units.     furosemide 40 MG tablet  Commonly known as: LASIX  Take 1 tablet (40 mg total) by mouth 2 (two) times a day.     hydrALAZINE 50 MG tablet  Commonly known as: APRESOLINE  Take 1 tablet (50 mg total) by mouth 3 (three) times daily.     isosorbide dinitrate 20 MG tablet  Commonly known as: ISORDIL  Take 2 tablets (40 mg total) by mouth 3 (three) times daily.     LANTUS SOLOSTAR U-100 INSULIN glargine 100 units/mL SubQ pen  Generic drug: insulin  Inject 8 Units into the skin every evening.     sertraline 100 MG tablet  Commonly known as: " ZOLOFT  Take 1 tablet (100 mg total) by mouth once daily.     sevelamer carbonate 800 mg Tab  Commonly known as: RENVELA  Take 2 tablets (1,600 mg total) by mouth 3 (three) times daily.     sodium bicarbonate 650 MG tablet  TAKE 1 TABLET(650 MG) BY MOUTH THREE TIMES DAILY     traZODone 50 MG tablet  Commonly known as: DESYREL  Take 0.5 tablets (25 mg total) by mouth every evening.              Indwelling Lines/Drains at time of discharge:   Lines/Drains/Airways       Central Venous Catheter Line  Duration                  Hemodialysis Catheter 05/25/23 1336 left internal jugular 302 days                    Time spent on the discharge of patient: 45 minutes         Hitesh Quijano MD  Attending Physician  Medical Director - [facility]  Department of Hospital Medicine  3/23/2024

## 2024-03-23 NOTE — PLAN OF CARE
Problem: Occupational Therapy  Goal: Occupational Therapy Goal  Description: Goals to be met by: 4/6/24     Patient will increase functional independence with ADLs by performing:    UE Dressing with Supervision.  LE Dressing with Moderate Assistance.  Grooming while seated with Stand-by Assistance.  Toileting from bedside commode with Minimal Assistance for hygiene and clothing management.   Toilet transfer to bedside commode with Moderate Assistance and drop arm commode.  Pt will perform scoot transfers with SBA laterally and forwards/backwards     Outcome: Ongoing, Progressing

## 2024-03-23 NOTE — PLAN OF CARE
Post-Acute Therapy Recommendation: low intensity     Evaluation completed today. PT goals appropriate.    Please continue Progressive Mobility Protocol as appropriate.    Appropriate transfer level with nursing staff: sitting EOB with SBA    3/23/2024    Problem: Physical Therapy  Goal: Physical Therapy Goal  Description: Goals to be met by: 2024     Patient will increase functional independence with mobility by performin. Supine to sit with [place] with bed flat  2. Sit to supine with [place] with bed flat  3. Rolling to Left and Right with [place].  4. Bed to wheelchair transfer with Minimal Assistance using slideboard as needed  5. Wheelchair propulsion x50 feet with Supervision using bilateral uppper extremities    Outcome: Ongoing, Progressing

## 2024-03-23 NOTE — ASSESSMENT & PLAN NOTE
Body mass index is 48.3 kg/m². Morbid obesity complicates all aspects of disease management from diagnostic modalities to treatment.

## 2024-03-23 NOTE — PLAN OF CARE
Problem: Adult Inpatient Plan of Care  Goal: Plan of Care Review  Outcome: Ongoing, Progressing  Goal: Patient-Specific Goal (Individualized)  Outcome: Ongoing, Progressing  Goal: Absence of Hospital-Acquired Illness or Injury  Outcome: Ongoing, Progressing  Goal: Optimal Comfort and Wellbeing  Outcome: Ongoing, Progressing  Goal: Readiness for Transition of Care  Outcome: Ongoing, Progressing     Problem: Bariatric Environmental Safety  Goal: Safety Maintained with Care  Outcome: Ongoing, Progressing     Problem: Diabetes Comorbidity  Goal: Blood Glucose Level Within Targeted Range  Outcome: Ongoing, Progressing     Problem: Infection  Goal: Absence of Infection Signs and Symptoms  Outcome: Ongoing, Progressing     Problem: Skin Injury Risk Increased  Goal: Skin Health and Integrity  Outcome: Ongoing, Progressing     Problem: Fall Injury Risk  Goal: Absence of Fall and Fall-Related Injury  Outcome: Ongoing, Progressing     Problem: Pain Acute  Goal: Acceptable Pain Control and Functional Ability  Outcome: Ongoing, Progressing     Problem: Fatigue  Goal: Improved Activity Tolerance  Outcome: Ongoing, Progressing     Problem: Heart Failure Comorbidity  Goal: Maintenance of Heart Failure Symptom Control  Outcome: Ongoing, Progressing     Problem: Hypertension Comorbidity  Goal: Blood Pressure in Desired Range  Outcome: Ongoing, Progressing     Problem: Adjustment to Illness (Chronic Kidney Disease)  Goal: Optimal Coping with Chronic Illness  Outcome: Ongoing, Progressing     Problem: Electrolyte Imbalance (Chronic Kidney Disease)  Goal: Electrolyte Balance  Outcome: Ongoing, Progressing     Problem: Fluid Volume Excess (Chronic Kidney Disease)  Goal: Fluid Balance  Outcome: Ongoing, Progressing     Problem: Functional Decline (Chronic Kidney Disease)  Goal: Optimal Functional Ability  Outcome: Ongoing, Progressing     Problem: Hematologic Alteration (Chronic Kidney Disease)  Goal: Absence of Anemia Signs and  Symptoms  Outcome: Ongoing, Progressing     Problem: Oral Intake Inadequate (Chronic Kidney Disease)  Goal: Optimal Oral Intake  Outcome: Ongoing, Progressing     Problem: Pain (Chronic Kidney Disease)  Goal: Acceptable Pain Control  Outcome: Ongoing, Progressing     Problem: Renal Function Impairment (Chronic Kidney Disease)  Goal: Minimize Renal Failure Effects  Outcome: Ongoing, Progressing

## 2024-03-23 NOTE — ASSESSMENT & PLAN NOTE
Most recent A1c 9.7, indicating poor chronic control. Will hold home oral agents while inpatient.  Continue basal and bolus insulin regimen.  Titrate to maintain adequate glucose control. Diabetic diet ordered.     Lab Results   Component Value Date    HGBA1C 9.7 (H) 02/04/2024

## 2024-03-23 NOTE — NURSING
Nurses Note -- 4 Eyes      3/23/2024   12:48 AM      Skin assessed during: Admit      [x] No Altered Skin Integrity Present    [x]Prevention Measures Documented      [] Yes- Altered Skin Integrity Present or Discovered   [] LDA Added if Not in Epic (Describe Wound)   [] New Altered Skin Integrity was Present on Admit and Documented in LDA   [] Wound Image Taken    Wound Care Consulted? No    Attending Nurse:  Luisana Lin RN/Staff Member:  Sarah     Healed scars to buttocks area

## 2024-03-23 NOTE — NURSING
Discharge instructions was given to pt. And her ,  verbalized understanding.  IV access removed, no questions was asked from pt. Or ,  pt. Currently awaiting bedside delivery.

## 2024-03-23 NOTE — ED NOTES
Assumed care of pt at this time.    Pt AAOx3, resting comfortably in bed, NAD, respirations E/UL, updated on POC, wheels locked and in low position, call bell with in reach, Comfort positioning and restroom needs were addressed. Necessary items were placed with in reach and was advised when a reassessment would take place.

## 2024-03-23 NOTE — ED PROVIDER NOTES
Source of History  patient, family, and EMR    Chief Complaint    Altered Mental Status (Arrives via EMS, sent over from the dialysis clinic for worsening confusion,  states the confusion has been going on for months, did receive dialysis first but then sent over patient to be evaluated for Altered mental status, currently AAOx2, )      History of Present Illness    Suyapa Connelly is a 58 y.o. female presenting with concern for progressive confusion and inability to perform ADLs over the last 6 months.  Has had 3 hospitalizations with similar.  Previously described as encephalopathy possibly uremic possibly metabolic.   is concerned that he can no longer care for her.  He thinks she was prematurely discharged the last time she was inpatient because she still had rather significant decline.  She was at dialysis today but because of worsening confusion they sent her here for evaluation.  She did receive a full session of dialysis.    History obtained by  and chart review.   Patient does not participate in history.    Review of Systems    As per HPI and below:  Review of Systems:    Pertinent information obtained as above.  Otherwise limited due to: change in mental status     Past History    As per HPI and below:  Past Medical History:   Diagnosis Date    Anxiety     Chronic pain syndrome     CKD (chronic kidney disease), stage III     Depression     Diabetes mellitus     type 2    Diabetes mellitus, type 2     GERD (gastroesophageal reflux disease)     Hyperemesis 3/23/2021    Hypokalemia 3/23/2021    Infection of below knee amputation stump 3/12/2022    Osteomyelitis     Osteomyelitis of left foot 4/30/2021    Ulcer of left foot     Vaginal delivery     x1       Past Surgical History:   Procedure Laterality Date    ABOVE-KNEE AMPUTATION Left 5/18/2021    Procedure: AMPUTATION, ABOVE KNEE;  Surgeon: Teddy Huber MD;  Location: [facility] OR [address] [place] [address];  Service: Vascular;  Laterality: Left;     ABOVE-KNEE AMPUTATION Right 3/18/2022    Procedure: AMPUTATION, ABOVE KNEE;  Surgeon: DAYNE Florez II, MD;  [facility];  Service: Vascular;  Laterality: Right;    Angiogram - Right Extremity Right 7/9/15    angiogram-left leg  10/6/15    ANGIOGRAPHY OF LOWER EXTREMITY Left 4/29/2021    Procedure: ANGIOGRAM, LOWER EXTREMITY;  Surgeon: Teddy Huber MD;  Location: [address];  Service: Vascular;  Laterality: Left;    BELOW KNEE AMPUTATION OF LOWER EXTREMITY Right 12/28/2021    Procedure: AMPUTATION, BELOW KNEE;  Surgeon: Kaitlyn Rojas MD;  Location: [facility] OR;  Service: General;  Laterality: Right;    CATHETERIZATION OF BOTH LEFT AND RIGHT HEART N/A 12/18/2019    Procedure: CATHETERIZATION, HEART, BOTH LEFT AND RIGHT;  Surgeon: Que Fernando III, MD;  Location: [facility] CATH LAB;  Service: Cardiology;  Laterality: N/A;    CORONARY ANGIOGRAPHY N/A 12/18/2019    Procedure: ANGIOGRAM, CORONARY ARTERY;  Surgeon: Que Fernando III, MD;  Location: [facility] CATH LAB;  Service: Cardiology;  Laterality: N/A;    CORONARY ANGIOGRAPHY INCLUDING BYPASS GRAFTS WITH CATHETERIZATION OF LEFT HEART N/A 7/28/2020    Procedure: ANGIOGRAM, CORONARY, INCLUDING BYPASS GRAFT, WITH LEFT HEART CATHETERIZATION, 9 am;  Surgeon: Rachel Easley MD;  Location: [facility] CATH LAB;  Service: Cardiology;  Laterality: N/A;    CORONARY ARTERY BYPASS GRAFT (CABG) N/A 1/14/2020    Procedure: CORONARY ARTERY BYPASS GRAFT (CABG) x 1     Off Pump;  Surgeon: Huang Altamirano MD;  Location: [address];  Service: Cardiovascular;  Laterality: N/A;    CREATION OF FEMORAL-TIBIAL ARTERY BYPASS Left 4/29/2021    Procedure: CREATION, BYPASS, ARTERIAL, FEMORAL TO ANTERIOR TIBIAL;  Surgeon: Teddy Huber MD;  [facility];  Service: Vascular;  Laterality: Left;    CREATION OF FEMOROPOPLITEAL ARTERIAL BYPASS USING GRAFT Left 8/18/2020    Procedure: CREATION, BYPASS, ARTERIAL, FEMORAL TO POPLITEAL, USING  GRAFT, LEFT LOWER EXTREMITY;  Surgeon: Teddy Huber MD;  Location: [facility] OR;  Service: Vascular;  Laterality: Left;  REQUEST 7:15 A.M. START----COVID NEGATIVE ON 8/17  1ST CASE STARTE PER LEANA ON 8/7/2020 @ 942AM-LO  RN PREOP 8/12/2020   T/S-----CLEARED BY CARDS-------PENDING INSURANCE    DEBRIDEMENT OF FOOT Left 9/8/2020    Procedure: DEBRIDEMENT, FOOT;  Surgeon: Rosio Mayes DPM;  Location: [facility] OR;  Service: Podiatry;  Laterality: Left;  request neoxx .   RN Pre Op 9-4-2020, Covid negative on 9/5/20. C A    DEBRIDEMENT OF FOOT  3/4/2021    Procedure: DEBRIDEMENT, FOOT;  Surgeon: Teddy uHber MD;  Location: [facility] OR;  Service: Vascular;;    DEBRIDEMENT OF FOOT Left 3/9/2021    Procedure: DEBRIDEMENT, FOOT, bone biopsy;  Surgeon: Rosio Mayes DPM;  Location: [facility] OR;  Service: Podiatry;  Laterality: Left;  Request neoxx---COVID IN AM  REQUESTING NOON START  RN Phone Pre op.On Blood thinners Plavix and Eliquis.  Covid am of surgery. C A    DEBRIDEMENT OF FOOT Left 5/4/2021    Procedure: DEBRIDEMENT, FOOT;  Surgeon: Farooq Morley DPM;  Location: [address];  Service: Podiatry;  Laterality: Left;    INSERTION OF TUNNELED CENTRAL VENOUS HEMODIALYSIS CATHETER N/A 1/27/2020    Procedure: Insertion, Catheter, Central Venous, Hemodialysis;  Surgeon: ESTEBAN Gomez III, MD;  Location: [facility] CATH LAB;  Service: Peripheral Vascular;  Laterality: N/A;    INSERTION OF TUNNELED CENTRAL VENOUS HEMODIALYSIS CATHETER  5/10/2023    Procedure: Insertion, Catheter, Central Venous, Hemodialysis;  Surgeon: Romulo Queen MD;  Location: [facility] CATH LAB;  Service: Cardiology;;    PERCUTANEOUS TRANSLUMINAL ANGIOPLASTY N/A 3/4/2021    Procedure: PTA (ANGIOPLASTY, PERCUTANEOUS, TRANSLUMINAL);  Surgeon: Teddy Huber MD;  Location: [facility] OR;  Service: Vascular;  Laterality: N/A;    REMOVAL OF ARTERIOVENOUS GRAFT Left 5/27/2021    Procedure: REMOVAL, GRAFT, LEFT LOWER EXTREMITY, WOUND EXPLORATION;   Surgeon: Teddy Huber MD;  Location: [facility] OR 2ND FLR;  Service: Vascular;  Laterality: Left;    REMOVAL OF NAIL OF DIGIT Left 3/9/2021    Procedure: REMOVAL, NAIL, DIGIT;  Surgeon: Rosio Mayes DPM;  Location: [facility] OR;  Service: Podiatry;  Laterality: Left;    RIGHT HEART CATHETERIZATION Right 5/10/2023    Procedure: INSERTION, CATHETER, RIGHT HEART;  Surgeon: Romulo Queen MD;  Location: [facility] CATH LAB;  Service: Cardiology;  Laterality: Right;    THROMBECTOMY Left 3/4/2021    Procedure: THROMBECTOMY, LEFT LOWER EXTREMITY BYPASS GRAFT, ANGIOGRAM, POSSIBLE INTERVENTION, POSSIBLE LEFT LOWER EXTREMITY BYPASS;  Surgeon: Teddy Huber MD;  Location: [facility] OR;  Service: Vascular;  Laterality: Left;    THROMBECTOMY Left 4/29/2021    Procedure: GRAFT THROMBECTOMY, LEFT LOWER EXTREMITY;  Surgeon: Teddy Huber MD;  Location: [facility] OR 2ND FLR;  Service: Vascular;  Laterality: Left;  14.5 min  1179.85 mGy  341.01 Gycm2  240 ml dye    THROMBECTOMY  10/22/2021    Procedure: THROMBECTOMY;  Surgeon: Saad Arenas MD;  Location: [facility] CATH LAB/EP;  Service: Cardiology;;       Social History     Socioeconomic History    Marital status:    Occupational History    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      Social Determinants of Health     Financial Resource Strain: Low Risk  (2/6/2024)    Overall Financial Resource Strain (CARDIA)     Difficulty of Paying Living Expenses: Not very hard   Food Insecurity: No Food Insecurity (2/6/2024)    Hunger Vital Sign     Worried About Running Out of Food in the Last Year: Never true     Ran Out of Food in the Last Year: Never true   Transportation Needs: No Transportation Needs (2/6/2024)     PRAPARE - Transportation     Lack of Transportation (Medical): No     Lack of Transportation (Non-Medical): No   Physical Activity: Inactive (2/6/2024)    Exercise Vital Sign     Days of Exercise per Week: 0 days     Minutes of Exercise per Session: 0 min   Stress: No Stress Concern Present (2/6/2024)    [facility] [place] [facility] [employer] - Occupational Stress Questionnaire     Feeling of Stress : Only a little   Recent Concern: Stress - Stress Concern Present (12/2/2023)    [employer] [facility] [place] [employer] - Occupational Stress Questionnaire     Feeling of Stress : Rather much   Social Connections: Moderately Isolated (2/6/2024)    Social Connection and Isolation Panel [NHANES]     Frequency of Communication with Friends and Family: More than three times a week     Frequency of Social Gatherings with Friends and Family: More than three times a week     Attends Gnosticist Services: Never     Active Member of Clubs or Organizations: No     Attends Club or Organization Meetings: Never     Marital Status:    Housing Stability: Low Risk  (2/6/2024)    Housing Stability Vital Sign     Unable to Pay for Housing in the Last Year: No     Number of Places Lived in the Last Year: 1     Unstable Housing in the Last Year: No       Family History   Problem Relation Age of Onset    Diabetes Mother     Diabetes Father     Heart disease Maternal Grandmother     No Known Problems Maternal Grandfather     Diabetes Paternal Grandmother     No Known Problems Paternal Grandfather     Anesthesia problems Neg Hx        Review of patient's allergies indicates:   Allergen Reactions    Ciprofloxacin Itching    Contrast media      Kidney injury    Iodine      Kidney injury    Pcn [penicillins]      Rash; tolerated ceftriaxone on 1/13/20       No current facility-administered medications on file prior to encounter.     Current Outpatient Medications on File Prior to Encounter   Medication Sig Dispense Refill    allopurinoL  (ZYLOPRIM) 100 MG tablet Take 0.5 tablets (50 mg total) by mouth every other day. 8 tablet 11    apixaban (ELIQUIS) 5 mg Tab Take 1 tablet (5 mg total) by mouth 2 (two) times daily. 60 tablet 11    atorvastatin (LIPITOR) 80 MG tablet Take 1 tablet (80 mg total) by mouth once daily. 90 tablet 3    blood sugar diagnostic Strp To check BG 3 times daily, to use with insurance preferred meter 100 each 3    blood-glucose meter Misc To check BG 3 times daily, to use with insurance preferred meter 1 each 0    calcitRIOL (ROCALTROL) 0.5 MCG Cap Take 1 capsule (0.5 mcg total) by mouth once daily. 90 capsule 3    calcium acetate,phosphat bind, (PHOSLO) 667 mg capsule Take 1 capsule (667 mg total) by mouth 3 (three) times daily with meals. 90 capsule 11    carvediloL (COREG) 6.25 MG tablet Take 1 tablet (6.25 mg total) by mouth 2 (two) times daily. 180 tablet 3    clopidogreL (PLAVIX) 75 mg tablet Take 1 tablet (75 mg total) by mouth once daily. 90 tablet 3    famotidine (PEPCID) 20 MG tablet Take 1 tablet (20 mg total) by mouth 2 (two) times daily. 180 tablet 3    furosemide (LASIX) 40 MG tablet Take 1 tablet (40 mg total) by mouth 2 (two) times a day. 180 tablet 1    hydrALAZINE (APRESOLINE) 50 MG tablet Take 1 tablet (50 mg total) by mouth 3 (three) times daily. 270 tablet 3    insulin (LANTUS SOLOSTAR U-100 INSULIN) glargine 100 units/mL SubQ pen Inject 8 Units into the skin every evening. 7.2 mL 0    insulin aspart, niacinamide, (FIASP FLEXTOUCH U-100 INSULIN) 100 unit/mL (3 mL) InPn Inject 1-5 Units into the skin before meals as needed (Per sliding scale). Blood sugar 150 to 200 add 1 units.  Blood sugar 201 to 250 add 2 units.  Blood sugar 251 to 300 add 3 units  Blood sugar 301 to 350 add 4 units.  Blood sugar greater than 350 add 5 units. 3 pen 3    isosorbide dinitrate (ISORDIL) 20 MG tablet Take 2 tablets (40 mg total) by mouth 3 (three) times daily. 180 tablet 11    lancets Misc To check BG 3 times daily, to use  "with insurance preferred meter 100 each 3    pen needle, diabetic 32 gauge x 5/32" Ndle 1 Units by Misc.(Non-Drug; Combo Route) route before meals as needed (SSI). 50 each 3    sertraline (ZOLOFT) 100 MG tablet Take 1 tablet (100 mg total) by mouth once daily. 90 tablet 3    sevelamer carbonate (RENVELA) 800 mg Tab Take 2 tablets (1,600 mg total) by mouth 3 (three) times daily. 270 tablet 3    sodium bicarbonate 650 MG tablet TAKE 1 TABLET(650 MG) BY MOUTH THREE TIMES DAILY 90 tablet 0    traZODone (DESYREL) 50 MG tablet Take 0.5 tablets (25 mg total) by mouth every evening. 15 tablet 11    [DISCONTINUED] lancing device Misc 1 Device by Misc.(Non-Drug; Combo Route) route 2 (two) times daily with meals. 1 each 0       Physical Exam    Reviewed nursing notes.  Vitals:    03/22/24 1608 03/22/24 2142 03/23/24 0001 03/23/24 0012   BP: (!) 162/84 (!) 164/81 (!) 172/81 (!) 172/81   BP Location: Right arm  Right arm    Patient Position: Sitting  Lying    Pulse: 80 86 86 86   Resp: 18 16 19 19   Temp: 97.9 °F (36.6 °C) 98 °F (36.7 °C) 98 °F (36.7 °C) 98 °F (36.7 °C)   TempSrc: Oral Oral Oral    SpO2: 96% 96% (!) 94% (!) 94%   Weight: 76.7 kg (169 lb)  71.8 kg (158 lb 4.6 oz) 71.8 kg (158 lb 4.6 oz)   Height:    4' (1.219 m)     General:  Alert, no acute distress.    Skin:  Warm, dry, intact.  No rash.  Head:  Normocephalic, atraumatic.    Neck:  Supple.   Eye:  extraocular movements are intact.    Ears, nose, mouth and throat:  Normal phonation.  Cardiovascular:  No edema.  Chest wall dialysis line in place.  Respiratory:  Respirations are non-labored.   Gastrointestinal:  Nondistended.  No belching or vomiting  Musculoskeletal:  Bilateral lower extremity amputee  Neurological:  Alert and but disoriented.  Her eyes are open.  She frequently falls asleep.  Nonfocal exam at this point however.  Able to follow most commands but then falls asleep easily.  Psychiatric:  Cooperative, appropriate mood & affect.       Initial MDM " and Data Review    58 y.o. female presenting for evaluation of progressive encephalopathy of unclear etiology.    Differential includes but is not limited to:  Encephalopathy, dialysis encephalopathy, metabolic encephalopathy, head trauma given that she is on anticoagulant, dementia is a possibility given chronicity    Work-up includes:  CBC, CMP, patient was not make urine so will not check urinalysis, CT head and and chest x-ray, ammonia, salicylate and acetaminophen level    Interventions include:  Nothing for now, we will place patient on cardiac monitor    The patient has significant medical comorbidities that influence decision making for this acute process, such as:  Reviewed prior history, patient is status post bilateral lower extremity amputations significant peripheral vascular disease, hemodialysis    I decided to obtain the patient's medical records and review relevant documentation from hospital records, clinic records, and EMS records.  Pertinent information is noted.  I reviewed the patient's most recent hospitalization.  There was some episodes of hallucinations that were noted previously.  Apparently she had gotten a new medication for sleep at some point and they thought that was part of the cause.  There was no concern for infectious etiology.  She was then discharged back to home to live with brother.    Recently had an aluminum level last month that was sent due to concern for aluminum encephalopathy and this dialysis patient.  Level was 6.    Medications   allopurinol split tablet 50 mg (has no administration in time range)   apixaban tablet 5 mg (has no administration in time range)   atorvastatin tablet 80 mg (has no administration in time range)   calcitRIOL capsule 0.5 mcg (has no administration in time range)   calcium acetate(phosphat bind) capsule 667 mg (has no administration in time range)   carvediloL tablet 6.25 mg (has no administration in time range)   clopidogreL tablet 75 mg  (has no administration in time range)   furosemide tablet 40 mg (has no administration in time range)   hydrALAZINE tablet 50 mg (has no administration in time range)   insulin detemir U-100 (Levemir) pen 5 Units (has no administration in time range)   isosorbide dinitrate tablet 40 mg (has no administration in time range)   sertraline tablet 100 mg (has no administration in time range)   sevelamer carbonate tablet 1,600 mg (has no administration in time range)   sodium bicarbonate tablet 650 mg (has no administration in time range)   sodium chloride 0.9% flush 1-10 mL (has no administration in time range)   melatonin tablet 6 mg (has no administration in time range)   polyethylene glycol packet 17 g (has no administration in time range)   acetaminophen tablet 650 mg (has no administration in time range)   simethicone chewable tablet 80 mg (has no administration in time range)   aluminum-magnesium hydroxide-simethicone 200-200-20 mg/5 mL suspension 30 mL (has no administration in time range)   acetaminophen tablet 650 mg (has no administration in time range)   naloxone 0.4 mg/mL injection 0.02 mg (has no administration in time range)   glucose chewable tablet 16 g (has no administration in time range)   glucose chewable tablet 24 g (has no administration in time range)   glucagon (human recombinant) injection 1 mg (has no administration in time range)       Results and ED Course    Labs Reviewed   CBC W/ AUTO DIFFERENTIAL - Abnormal; Notable for the following components:       Result Value    MCH 31.4 (*)     MCHC 31.9 (*)     RDW 16.9 (*)     Mono % 15.6 (*)     All other components within normal limits   COMPREHENSIVE METABOLIC PANEL - Abnormal; Notable for the following components:    Sodium 134 (*)     Glucose 122 (*)     Creatinine 3.4 (*)     Total Protein 9.0 (*)     Albumin 2.8 (*)     Total Bilirubin 1.3 (*)     Alkaline Phosphatase 242 (*)     eGFR 15.1 (*)     All other components within normal limits    ACETAMINOPHEN LEVEL - Abnormal; Notable for the following components:    Acetaminophen (Tylenol), Serum <3.0 (*)     All other components within normal limits   SALICYLATE LEVEL - Abnormal; Notable for the following components:    Salicylate Lvl <5.0 (*)     All other components within normal limits   POCT GLUCOSE - Abnormal; Notable for the following components:    POCT Glucose 135 (*)     All other components within normal limits   POCT GLUCOSE - Abnormal; Notable for the following components:    POCT Glucose 122 (*)     All other components within normal limits   ISTAT PROCEDURE - Abnormal; Notable for the following components:    POC PH 7.560 (*)     POC PCO2 32.4 (*)     POC HCO3 29.1 (*)     POC BE 7 (*)     POC TCO2 30 (*)     All other components within normal limits   MAGNESIUM   PHOSPHORUS   AMMONIA   ALUMINUM LEVEL   POCT GLUCOSE MONITORING CONTINUOUS       Imaging Results              CT Head Without Contrast (Final result)  Result time 03/22/24 21:40:22      Final result by Rizwan Valdez DO (03/22/24 21:40:22)                   Impression:      No acute intracranial abnormality.    Stable chronic findings as detailed above.      Electronically signed by: Rizwan Valdez  Date:    03/22/2024  Time:    21:40               Narrative:    EXAMINATION:  CT HEAD WITHOUT CONTRAST    CLINICAL HISTORY:  Mental status change, unknown cause;    TECHNIQUE:  Low dose axial CT images obtained throughout the head without intravenous contrast. Sagittal and coronal reconstructions were performed.    COMPARISON:  MRI brain from 02/05/2024.  CT head from 02/04/2024.    FINDINGS:  There is motion artifact which limits evaluation of the vertex.  There is brain parenchymal volume loss and prominence of the CSF spaces.  There is no hydrocephalus.  There are advanced chronic microvascular ischemic changes, stable from prior.  Again seen are small nodules adjacent to the lateral ventricles, suspicious for minimal gray  matter heterotopia, stable across multiple priors.  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  There is a calcified extra-axial lesion in the anterior left middle cranial fossa, likely a small calcified meningioma, stable.  No extra-axial blood or fluid collections.  No parenchymal mass, hemorrhage, edema or CT evidence of an acute major vascular distribution infarct.    No calvarial fracture.  The scalp is unremarkable.  Bilateral paranasal sinuses and mastoid air cells are clear.                                       X-Ray Chest AP Portable (Final result)  Result time 03/22/24 20:37:34      Final result by Rizwan Valdez DO (03/22/24 20:37:34)                   Impression:      Interstitial pulmonary edema/CHF.      Electronically signed by: Rizwan Valdze  Date:    03/22/2024  Time:    20:37               Narrative:    EXAMINATION:  XR CHEST AP PORTABLE    CLINICAL HISTORY:  Altered mental status, unspecified    TECHNIQUE:  Single frontal view of the chest was performed.    COMPARISON:  02/04/2024.    FINDINGS:  There is a dual lumen left internal jugular central venous catheter with the tip overlying the cavoatrial junction.  There are median sternotomy wires.  There are surgical clips.  The lungs are hypoexpanded.  There is interstitial edema/CHF, slightly progressed from prior.  No new large focal consolidation.  The pleural spaces are clear.  The cardiac silhouette is enlarged.  Osseous structures demonstrate degenerative changes.  There are calcifications of the aortic arch.  There is a nonspecific oval density overlying the left hemiabdomen, just left of midline, nonspecific and possibly external to the patient.                                      ED Course as of 03/23/24 0028   Fri Mar 22, 2024   1939 POCT Glucose(!): 122 [AC]   2047 POC PH(!!): 7.560 [AC]   2048 POC PCO2(!): 32.4 [AC]   2048 Slightly alkalotic [AC]   2107 X-Ray Chest AP Portable  Volume overload but without focal  consolidation [AC]   2143 Sodium(!): 134 [AC]   2144 Creatinine(!): 3.4  ESRD [AC]   2144 BILIRUBIN TOTAL(!): 1.3 [AC]   2144 ALP(!): 242 [AC]   2144 CT Head Without Contrast  No acute findings [AC]   2144 Will plan to admit [AC]      ED Course User Index  [AC] Buzz Zamarripa,                EKG interpreted by myself    EKG  Performed: 03/23/2024   Rate/Rhythm/Axis: 86 bpm, sinus rhythm, nml axis  QRS 90 ms  Qtc 519 ms  Impression:  Sinus rhythm, QT prolongation is present, no ischemia      Relevant imaging interpreted by myself  CT head no acute findings    Impression and Plan    58 y.o. female with findings of persistent encephalopathy that seems to be progressive in nature based on the work up in the emergency department as above.    Important lab/imaging findings include:  Reviewed as above    Progressive encephalopathy.  Attempted to send out aluminum level but they do not have the appropriate tube.  May need to be done again inpatient.    Admit to Hospital Medicine    All tests, treatment options and disposition options were discussed with the patient.  The decision was made to admit the patient to the hospital.    The patient was admitted in stable condition and all further questions/concerns by patient and/or family were addressed.             Final diagnoses:  [R41.82] AMS (altered mental status)  [R94.31] QT prolongation (Primary)        ED Disposition Condition    Observation Stable                  Buzz Zamarripa DO  03/23/24 0029

## 2024-03-23 NOTE — SUBJECTIVE & OBJECTIVE
Past Medical History:   Diagnosis Date    Anxiety     Chronic pain syndrome     CKD (chronic kidney disease), stage III     Depression     Diabetes mellitus     type 2    Diabetes mellitus, type 2     GERD (gastroesophageal reflux disease)     Hyperemesis 3/23/2021    Hypokalemia 3/23/2021    Infection of below knee amputation stump 3/12/2022    Osteomyelitis     Osteomyelitis of left foot 4/30/2021    Ulcer of left foot     Vaginal delivery     x1       Past Surgical History:   Procedure Laterality Date    ABOVE-KNEE AMPUTATION Left 5/18/2021    Procedure: AMPUTATION, ABOVE KNEE;  Surgeon: Teddy Huber MD;  Location: [facility] OR [address] [place] [address];  Service: Vascular;  Laterality: Left;    ABOVE-KNEE AMPUTATION Right 3/18/2022    Procedure: AMPUTATION, ABOVE KNEE;  Surgeon: DAYNE Florez II, MD;  Location: [facility] OR [address] [place] [address];  Service: Vascular;  Laterality: Right;    Angiogram - Right Extremity Right 7/9/15    angiogram-left leg  10/6/15    ANGIOGRAPHY OF LOWER EXTREMITY Left 4/29/2021    Procedure: ANGIOGRAM, LOWER EXTREMITY;  Surgeon: Teddy Huber MD;  Location: [facility] OR [address] [place] [address];  Service: Vascular;  Laterality: Left;    BELOW KNEE AMPUTATION OF LOWER EXTREMITY Right 12/28/2021    Procedure: AMPUTATION, BELOW KNEE;  Surgeon: Kaitlyn Rojas MD;  Location: [facility] OR;  Service: General;  Laterality: Right;    CATHETERIZATION OF BOTH LEFT AND RIGHT HEART N/A 12/18/2019    Procedure: CATHETERIZATION, HEART, BOTH LEFT AND RIGHT;  Surgeon: Que Fernando III, MD;  Location: [facility] CATH LAB;  Service: Cardiology;  Laterality: N/A;    CORONARY ANGIOGRAPHY N/A 12/18/2019    Procedure: ANGIOGRAM, CORONARY ARTERY;  Surgeon: Que Fernando III, MD;  Location: [facility] CATH LAB;  Service: Cardiology;  Laterality: N/A;    CORONARY ANGIOGRAPHY INCLUDING BYPASS GRAFTS WITH CATHETERIZATION OF LEFT HEART N/A 7/28/2020    Procedure: ANGIOGRAM, CORONARY, INCLUDING BYPASS GRAFT, WITH LEFT HEART CATHETERIZATION, 9 am;  Surgeon:  Rachel Easley MD;  Location: [facility] CATH LAB;  Service: Cardiology;  Laterality: N/A;    CORONARY ARTERY BYPASS GRAFT (CABG) N/A 1/14/2020    Procedure: CORONARY ARTERY BYPASS GRAFT (CABG) x 1     Off Pump;  Surgeon: Huang Altamirano MD;  Location: [facility] OR [address] [place] [address];  Service: Cardiovascular;  Laterality: N/A;    CREATION OF FEMORAL-TIBIAL ARTERY BYPASS Left 4/29/2021    Procedure: CREATION, BYPASS, ARTERIAL, FEMORAL TO ANTERIOR TIBIAL;  Surgeon: Teddy Huber MD;  [facility];  Service: Vascular;  Laterality: Left;    CREATION OF FEMOROPOPLITEAL ARTERIAL BYPASS USING GRAFT Left 8/18/2020    Procedure: CREATION, BYPASS, ARTERIAL, FEMORAL TO POPLITEAL, USING GRAFT, LEFT LOWER EXTREMITY;  Surgeon: Teddy Huber MD;  Location: [facility];  Service: Vascular;  Laterality: Left;  REQUEST 7:15 A.M. START----COVID NEGATIVE ON 8/17 1ST CASE STARTKENYA DUEÑAS ON 8/7/2020 @ 942AM-  RN PREOP 8/12/2020   T/S-----CLEARED BY CARDS-------PENDING INSURANCE    DEBRIDEMENT OF FOOT Left 9/8/2020    Procedure: DEBRIDEMENT, FOOT;  Surgeon: Rosio Mayes DPM;  Location: [facility];  Service: Podiatry;  Laterality: Left;  request neoxx .   RN Pre Op 9-4-2020, Covid negative on 9/5/20. C A    DEBRIDEMENT OF FOOT  3/4/2021    Procedure: DEBRIDEMENT, FOOT;  Surgeon: Teddy Huber MD;  Location: [facility] OR;  Service: Vascular;;    DEBRIDEMENT OF FOOT Left 3/9/2021    Procedure: DEBRIDEMENT, FOOT, bone biopsy;  Surgeon: Rosio Mayes DPM;  Location: [facility] OR;  Service: Podiatry;  Laterality: Left;  Request neoxx---COVID IN AM  REQUESTING NOON START  RN Phone Pre op.On Blood thinners Plavix and Eliquis.  Covid am of surgery. C A    DEBRIDEMENT OF FOOT Left 5/4/2021    Procedure: DEBRIDEMENT, FOOT;  Surgeon: Farooq Morley DPM;  [address];  Service: Podiatry;  Laterality: Left;    INSERTION OF TUNNELED CENTRAL VENOUS HEMODIALYSIS CATHETER N/A 1/27/2020    Procedure: Insertion, Catheter,  Central Venous, Hemodialysis;  Surgeon: ESTEBAN Gomez III, MD;  Location: [facility] CATH LAB;  Service: Peripheral Vascular;  Laterality: N/A;    INSERTION OF TUNNELED CENTRAL VENOUS HEMODIALYSIS CATHETER  5/10/2023    Procedure: Insertion, Catheter, Central Venous, Hemodialysis;  Surgeon: Romulo Queen MD;  Location: [facility] CATH LAB;  Service: Cardiology;;    PERCUTANEOUS TRANSLUMINAL ANGIOPLASTY N/A 3/4/2021    Procedure: PTA (ANGIOPLASTY, PERCUTANEOUS, TRANSLUMINAL);  Surgeon: Teddy Huber MD;  Location: [facility] OR;  Service: Vascular;  Laterality: N/A;    REMOVAL OF ARTERIOVENOUS GRAFT Left 5/27/2021    Procedure: REMOVAL, GRAFT, LEFT LOWER EXTREMITY, WOUND EXPLORATION;  Surgeon: Teddy Huber MD;  Location: [facility] 2ND FLR;  Service: Vascular;  Laterality: Left;    REMOVAL OF NAIL OF DIGIT Left 3/9/2021    Procedure: REMOVAL, NAIL, DIGIT;  Surgeon: Rosio Mayes DPM;  Location: [facility] OR;  Service: Podiatry;  Laterality: Left;    RIGHT HEART CATHETERIZATION Right 5/10/2023    Procedure: INSERTION, CATHETER, RIGHT HEART;  Surgeon: Romulo Queen MD;  Location: [facility] CATH LAB;  Service: Cardiology;  Laterality: Right;    THROMBECTOMY Left 3/4/2021    Procedure: THROMBECTOMY, LEFT LOWER EXTREMITY BYPASS GRAFT, ANGIOGRAM, POSSIBLE INTERVENTION, POSSIBLE LEFT LOWER EXTREMITY BYPASS;  Surgeon: Teddy Huber MD;  Location: [facility] OR;  Service: Vascular;  Laterality: Left;    THROMBECTOMY Left 4/29/2021    Procedure: GRAFT THROMBECTOMY, LEFT LOWER EXTREMITY;  Surgeon: Teddy Huber MD;  Location: [facility] OR 2ND FLR;  Service: Vascular;  Laterality: Left;  14.5 min  1179.85 mGy  341.01 Gycm2  240 ml dye    THROMBECTOMY  10/22/2021    Procedure: THROMBECTOMY;  Surgeon: Saad Arenas MD;  Location: [facility] CATH LAB/EP;  Service: Cardiology;;       Review of patient's allergies indicates:   Allergen Reactions    Ciprofloxacin Itching    Contrast media      Kidney injury    Iodine      Kidney injury     Pcn [penicillins]      Rash; tolerated ceftriaxone on 1/13/20       No current facility-administered medications on file prior to encounter.     Current Outpatient Medications on File Prior to Encounter   Medication Sig    allopurinoL (ZYLOPRIM) 100 MG tablet Take 0.5 tablets (50 mg total) by mouth every other day.    apixaban (ELIQUIS) 5 mg Tab Take 1 tablet (5 mg total) by mouth 2 (two) times daily.    atorvastatin (LIPITOR) 80 MG tablet Take 1 tablet (80 mg total) by mouth once daily.    blood sugar diagnostic Strp To check BG 3 times daily, to use with insurance preferred meter    blood-glucose meter Misc To check BG 3 times daily, to use with insurance preferred meter    calcitRIOL (ROCALTROL) 0.5 MCG Cap Take 1 capsule (0.5 mcg total) by mouth once daily.    calcium acetate,phosphat bind, (PHOSLO) 667 mg capsule Take 1 capsule (667 mg total) by mouth 3 (three) times daily with meals.    carvediloL (COREG) 6.25 MG tablet Take 1 tablet (6.25 mg total) by mouth 2 (two) times daily.    clopidogreL (PLAVIX) 75 mg tablet Take 1 tablet (75 mg total) by mouth once daily.    famotidine (PEPCID) 20 MG tablet Take 1 tablet (20 mg total) by mouth 2 (two) times daily.    furosemide (LASIX) 40 MG tablet Take 1 tablet (40 mg total) by mouth 2 (two) times a day.    hydrALAZINE (APRESOLINE) 50 MG tablet Take 1 tablet (50 mg total) by mouth 3 (three) times daily.    insulin (LANTUS SOLOSTAR U-100 INSULIN) glargine 100 units/mL SubQ pen Inject 8 Units into the skin every evening.    insulin aspart, niacinamide, (FIASP FLEXTOUCH U-100 INSULIN) 100 unit/mL (3 mL) InPn Inject 1-5 Units into the skin before meals as needed (Per sliding scale). Blood sugar 150 to 200 add 1 units.  Blood sugar 201 to 250 add 2 units.  Blood sugar 251 to 300 add 3 units  Blood sugar 301 to 350 add 4 units.  Blood sugar greater than 350 add 5 units.    isosorbide dinitrate (ISORDIL) 20 MG tablet Take 2 tablets (40 mg total) by mouth 3 (three) times  "daily.    lancets Misc To check BG 3 times daily, to use with insurance preferred meter    pen needle, diabetic 32 gauge x 5/32" Ndle 1 Units by Misc.(Non-Drug; Combo Route) route before meals as needed (SSI).    sertraline (ZOLOFT) 100 MG tablet Take 1 tablet (100 mg total) by mouth once daily.    sevelamer carbonate (RENVELA) 800 mg Tab Take 2 tablets (1,600 mg total) by mouth 3 (three) times daily.    sodium bicarbonate 650 MG tablet TAKE 1 TABLET(650 MG) BY MOUTH THREE TIMES DAILY    traZODone (DESYREL) 50 MG tablet Take 0.5 tablets (25 mg total) by mouth every evening.    [DISCONTINUED] lancing device Misc 1 Device by Misc.(Non-Drug; Combo Route) route 2 (two) times daily with meals.     Family History       Problem Relation (Age of Onset)    Diabetes Mother, Father, Paternal Grandmother    Heart disease Maternal Grandmother    No Known Problems Maternal Grandfather, Paternal Grandfather          Tobacco Use    Smoking status: Former    Smokeless tobacco: Never   Substance and Sexual Activity    Alcohol use: No    Drug use: Yes     Types: Marijuana     Comment: occassional    Sexual activity: Yes     Partners: Male     Review of Systems   All other systems reviewed and are negative.    Objective:     Vital Signs (Most Recent):  Temp: 98 °F (36.7 °C) (03/23/24 0012)  Pulse: 86 (03/23/24 0012)  Resp: 19 (03/23/24 0012)  BP: (!) 172/81 (03/23/24 0012)  SpO2: (!) 94 % (03/23/24 0012) Vital Signs (24h Range):  Temp:  [97.9 °F (36.6 °C)-98 °F (36.7 °C)] 98 °F (36.7 °C)  Pulse:  [80-86] 86  Resp:  [16-19] 19  SpO2:  [94 %-96 %] 94 %  BP: (162-172)/(81-84) 172/81     Weight: 71.8 kg (158 lb 4.6 oz)  Body mass index is 48.3 kg/m².     Physical Exam  Vitals and nursing note reviewed.   Constitutional:       General: She is not in acute distress.     Appearance: She is well-developed. She is obese. She is ill-appearing (chronically). She is not diaphoretic.   HENT:      Head: Normocephalic and atraumatic.   Eyes:      " General: No scleral icterus.     Conjunctiva/sclera: Conjunctivae normal.   Neck:      Vascular: No JVD.   Cardiovascular:      Rate and Rhythm: Normal rate and regular rhythm.   Pulmonary:      Effort: Pulmonary effort is normal. No respiratory distress.      Breath sounds: No wheezing or rales.   Abdominal:      General: There is no distension.      Tenderness: There is no abdominal tenderness.   Genitourinary:     Comments: Purewick in place  Musculoskeletal:      Comments: BL LE amputee     Skin:     Coloration: Skin is not jaundiced or pale.   Neurological:      Mental Status: She is alert.      Motor: No abnormal muscle tone.      Comments: Patient is oriented to person, place, and somewhat situation, but is extremely tearful which limits conversation   Psychiatric:         Mood and Affect: Affect is tearful.                Significant Labs: All pertinent labs within the past 24 hours have been reviewed.  CBC:   Recent Labs   Lab 03/22/24  2108   WBC 4.88   HGB 12.9   HCT 40.4        CMP:   Recent Labs   Lab 03/22/24  2108   *   K 3.6   CL 97   CO2 25   *   BUN 15   CREATININE 3.4*   CALCIUM 9.2   PROT 9.0*   ALBUMIN 2.8*   BILITOT 1.3*   ALKPHOS 242*   AST 36   ALT 15   ANIONGAP 12       Significant Imaging: I have reviewed all pertinent imaging results/findings within the past 24 hours.

## 2024-03-23 NOTE — HPI
"Suyapa Connelly is a 58 y.o. female with severe PVD leading to bilateral LE amputations, debility, ESRD on HD, T2DM on insulin, CAD, AFIb, recurrent encephalopathy, obesity who presents today with cognitive and functional decline.     History is provided mainly by the patient's  as he wishes to tell about her decline.    He states that over the past year, she has had a progressive and recently rapid decline in her "cognitive stuff," which he describes as no longer being able to care for herself, feed herself, transfer herself, and with intermittent hallucinations.  He states that at her "baseline" (which she has not exhibited in over a year), she was a happy, outgoing, productive person, however she has not been this in quite some time.  They are frustrated that she has not been able to return to this baseline despite multiple hospitalizations and extensive workup.  They have no new complaints, however were sent here after dialysis today due to her generalized weakness which has been ongoing for over a year.  "

## 2024-03-24 NOTE — PLAN OF CARE
Update at 2:05 PM  Patient ready to leave, however, no accepting HH. Dr. Quijano approved patient to leave and CM follow-up on HH acceptance Sunday, 3/24/24.    Update at 1:15 PM  Reviewed chart for past and current home health agencies. Unable to locate in the chart. Will proceed with [facility] referrals.    CM received message from care team regarding discharge. Plan is discharge home with spouse and home health. Spouse at bedside and will provide discharge transportation. Will search payor website for in-network agencies and send referrals via [facility].      Matilda Donaldson RN  Weekend  - [facility] [place]  Spectra: [phone]

## 2024-03-24 NOTE — PLAN OF CARE
[facility] - Observation 11H  Discharge Final Note    Primary Care Provider: Magen Christensen MD    Expected Discharge Date: 3/23/2024    Final Discharge Note (most recent)       Final Note - 03/23/24 1405          Final Note    Assessment Type Final Discharge Note     Anticipated Discharge Disposition Home-Health Care [facility]     What phone number can be called within the next 1-3 days to see how you are doing after discharge? [phone]     Hospital Resources/Appts/Education Provided Provided patient/caregiver with written discharge plan information;Post-Acute resouces added to AVS        Post-Acute Status    Post-Acute Authorization Home Health     Home Health Status Referrals Sent   no accepting agency at time of discharge.    Discharge Delays None known at this time                   Important Message from Medicare         Contact Info       Magen Christensen MD   Specialty: Family Medicine   Relationship: PCP - General    [address] [place] [id number]   Phone: [phone]       Next Steps: Schedule an appointment as soon as possible for a visit          Future Appointments   Date Time Provider Department Center   3/26/2024  9:30 AM Feliciano Nguyen III, PA-C [facility]   4/15/2024  8:30 AM [facility] DNP, NP [facility] RN  Weekend  - [facility] [place]  Spectra: [phone]

## 2024-03-25 ENCOUNTER — PATIENT OUTREACH (OUTPATIENT)
Dept: ADMINISTRATIVE | Facility: CLINIC | Age: 59
End: 2024-03-25
Payer: MEDICARE

## 2024-03-25 RX ORDER — MULTIVITAMIN
1 TABLET ORAL DAILY
COMMUNITY

## 2024-03-25 RX ORDER — ACETAMINOPHEN 500 MG
500 TABLET ORAL EVERY 6 HOURS PRN
COMMUNITY

## 2024-03-25 RX ORDER — PREGABALIN 100 MG/1
100 CAPSULE ORAL 2 TIMES DAILY
Status: ON HOLD | COMMUNITY
End: 2024-05-20 | Stop reason: HOSPADM

## 2024-03-25 NOTE — PROGRESS NOTES
C3 nurse spoke with Suyapa Connelly, patient's spouse, Randell,  for a [facility]CC post hospital discharge follow up call. The patient has a scheduled HOSFU appointment with Feliciano Nguyen III, PA-C on 3/26/2024 @ 9:30 AM.

## 2024-05-03 RX ORDER — SODIUM BICARBONATE 650 MG/1
TABLET ORAL
Qty: 90 TABLET | Refills: 0 | Status: SHIPPED | OUTPATIENT
Start: 2024-05-03

## 2024-05-07 ENCOUNTER — HOSPITAL ENCOUNTER (INPATIENT)
Facility: HOSPITAL | Age: 59
LOS: 13 days | Discharge: HOME HEALTH | DRG: 871 | End: 2024-05-20
Attending: STUDENT IN AN ORGANIZED HEALTH CARE EDUCATION/TRAINING PROGRAM | Admitting: INTERNAL MEDICINE
Payer: MEDICARE

## 2024-05-07 DIAGNOSIS — R41.82 AMS (ALTERED MENTAL STATUS): ICD-10-CM

## 2024-05-07 DIAGNOSIS — R53.81 DEBILITY: ICD-10-CM

## 2024-05-07 DIAGNOSIS — R41.89 UNRESPONSIVE: ICD-10-CM

## 2024-05-07 DIAGNOSIS — L24.A0 IRRITANT CONTACT DERMATITIS DUE TO FRICTION OR CONTACT WITH BODY FLUIDS, UNSPECIFIED: ICD-10-CM

## 2024-05-07 DIAGNOSIS — R09.02 HYPOXIA: Primary | ICD-10-CM

## 2024-05-07 DIAGNOSIS — L89.150 PRESSURE INJURY OF SACRAL REGION, UNSTAGEABLE: ICD-10-CM

## 2024-05-07 DIAGNOSIS — A41.9 SEVERE SEPSIS: ICD-10-CM

## 2024-05-07 DIAGNOSIS — R65.20 SEVERE SEPSIS: ICD-10-CM

## 2024-05-07 LAB
ALBUMIN SERPL BCP-MCNC: 2.7 G/DL (ref 3.5–5.2)
ALLENS TEST: ABNORMAL
ALLENS TEST: NORMAL
ALP SERPL-CCNC: 164 U/L (ref 55–135)
ALT SERPL W/O P-5'-P-CCNC: 10 U/L (ref 10–44)
AMPHET+METHAMPHET UR QL: NEGATIVE
ANION GAP SERPL CALC-SCNC: 13 MMOL/L (ref 8–16)
AST SERPL-CCNC: 20 U/L (ref 10–40)
BACTERIA #/AREA URNS AUTO: ABNORMAL /HPF
BARBITURATES UR QL SCN>200 NG/ML: NEGATIVE
BASOPHILS # BLD AUTO: 0.05 K/UL (ref 0–0.2)
BASOPHILS NFR BLD: 1 % (ref 0–1.9)
BENZODIAZ UR QL SCN>200 NG/ML: NEGATIVE
BILIRUB SERPL-MCNC: 0.8 MG/DL (ref 0.1–1)
BILIRUB UR QL STRIP: NEGATIVE
BUN SERPL-MCNC: 56 MG/DL (ref 6–30)
BUN SERPL-MCNC: 59 MG/DL (ref 6–20)
BZE UR QL SCN: NEGATIVE
CALCIUM SERPL-MCNC: 9 MG/DL (ref 8.7–10.5)
CANNABINOIDS UR QL SCN: NEGATIVE
CHLORIDE SERPL-SCNC: 103 MMOL/L (ref 95–110)
CHLORIDE SERPL-SCNC: 103 MMOL/L (ref 95–110)
CLARITY UR REFRACT.AUTO: ABNORMAL
CO2 SERPL-SCNC: 23 MMOL/L (ref 23–29)
COLOR UR AUTO: ABNORMAL
CREAT SERPL-MCNC: 7.5 MG/DL (ref 0.5–1.4)
CREAT SERPL-MCNC: 8.9 MG/DL (ref 0.5–1.4)
CREAT UR-MCNC: 99 MG/DL (ref 15–325)
DIFFERENTIAL METHOD BLD: ABNORMAL
EOSINOPHIL # BLD AUTO: 0 K/UL (ref 0–0.5)
EOSINOPHIL NFR BLD: 0.8 % (ref 0–8)
ERYTHROCYTE [DISTWIDTH] IN BLOOD BY AUTOMATED COUNT: 16.8 % (ref 11.5–14.5)
EST. GFR  (NO RACE VARIABLE): 5.9 ML/MIN/1.73 M^2
GLUCOSE SERPL-MCNC: 133 MG/DL (ref 70–110)
GLUCOSE SERPL-MCNC: 134 MG/DL (ref 70–110)
GLUCOSE UR QL STRIP: NEGATIVE
HCO3 UR-SCNC: 25.1 MMOL/L (ref 24–28)
HCT VFR BLD AUTO: 35.5 % (ref 37–48.5)
HCT VFR BLD CALC: 38 %PCV (ref 36–54)
HGB BLD-MCNC: 11.8 G/DL (ref 12–16)
HGB UR QL STRIP: ABNORMAL
HYALINE CASTS UR QL AUTO: 1 /LPF
IMM GRANULOCYTES # BLD AUTO: 0.01 K/UL (ref 0–0.04)
IMM GRANULOCYTES NFR BLD AUTO: 0.2 % (ref 0–0.5)
INR PPP: 1.2 (ref 0.8–1.2)
KETONES UR QL STRIP: NEGATIVE
LACTATE SERPL-SCNC: 1.3 MMOL/L (ref 0.5–2.2)
LDH SERPL L TO P-CCNC: 1.42 MMOL/L (ref 0.5–2.2)
LEUKOCYTE ESTERASE UR QL STRIP: ABNORMAL
LYMPHOCYTES # BLD AUTO: 0.8 K/UL (ref 1–4.8)
LYMPHOCYTES NFR BLD: 15 % (ref 18–48)
MAGNESIUM SERPL-MCNC: 2.2 MG/DL (ref 1.6–2.6)
MCH RBC QN AUTO: 31.8 PG (ref 27–31)
MCHC RBC AUTO-ENTMCNC: 33.2 G/DL (ref 32–36)
MCV RBC AUTO: 96 FL (ref 82–98)
METHADONE UR QL SCN>300 NG/ML: NEGATIVE
MICROSCOPIC COMMENT: ABNORMAL
MONOCYTES # BLD AUTO: 0.7 K/UL (ref 0.3–1)
MONOCYTES NFR BLD: 13 % (ref 4–15)
NEUTROPHILS # BLD AUTO: 3.7 K/UL (ref 1.8–7.7)
NEUTROPHILS NFR BLD: 70 % (ref 38–73)
NITRITE UR QL STRIP: NEGATIVE
NRBC BLD-RTO: 0 /100 WBC
OPIATES UR QL SCN: NEGATIVE
PCO2 BLDA: 37.2 MMHG (ref 35–45)
PCP UR QL SCN>25 NG/ML: NEGATIVE
PH SMN: 7.44 [PH] (ref 7.35–7.45)
PH UR STRIP: 6 [PH] (ref 5–8)
PLATELET # BLD AUTO: 199 K/UL (ref 150–450)
PMV BLD AUTO: 11.7 FL (ref 9.2–12.9)
PO2 BLDA: 92 MMHG (ref 40–60)
POC BE: 1 MMOL/L
POC IONIZED CALCIUM: 1.04 MMOL/L (ref 1.06–1.42)
POC SATURATED O2: 97 % (ref 95–100)
POC TCO2 (MEASURED): 24 MMOL/L (ref 23–29)
POC TCO2: 26 MMOL/L (ref 24–29)
POCT GLUCOSE: 151 MG/DL (ref 70–110)
POTASSIUM BLD-SCNC: 5.1 MMOL/L (ref 3.5–5.1)
POTASSIUM SERPL-SCNC: 5.1 MMOL/L (ref 3.5–5.1)
PROT SERPL-MCNC: 8.5 G/DL (ref 6–8.4)
PROT UR QL STRIP: ABNORMAL
PROTHROMBIN TIME: 12.8 SEC (ref 9–12.5)
RBC # BLD AUTO: 3.71 M/UL (ref 4–5.4)
RBC #/AREA URNS AUTO: 51 /HPF (ref 0–4)
SAMPLE: ABNORMAL
SAMPLE: ABNORMAL
SAMPLE: NORMAL
SITE: ABNORMAL
SITE: NORMAL
SODIUM BLD-SCNC: 138 MMOL/L (ref 136–145)
SODIUM SERPL-SCNC: 139 MMOL/L (ref 136–145)
SP GR UR STRIP: 1.01 (ref 1–1.03)
SQUAMOUS #/AREA URNS AUTO: 1 /HPF
TOXICOLOGY INFORMATION: NORMAL
TROPONIN I SERPL DL<=0.01 NG/ML-MCNC: 0.07 NG/ML (ref 0–0.03)
TSH SERPL DL<=0.005 MIU/L-ACNC: 1.91 UIU/ML (ref 0.4–4)
URN SPEC COLLECT METH UR: ABNORMAL
WBC # BLD AUTO: 5.25 K/UL (ref 3.9–12.7)
WBC #/AREA URNS AUTO: >100 /HPF (ref 0–5)
WBC CLUMPS UR QL AUTO: ABNORMAL

## 2024-05-07 PROCEDURE — 81001 URINALYSIS AUTO W/SCOPE: CPT | Mod: HCNC,XB | Performed by: EMERGENCY MEDICINE

## 2024-05-07 PROCEDURE — 31500 INSERT EMERGENCY AIRWAY: CPT | Mod: HCNC

## 2024-05-07 PROCEDURE — 83605 ASSAY OF LACTIC ACID: CPT | Mod: HCNC | Performed by: EMERGENCY MEDICINE

## 2024-05-07 PROCEDURE — 12000002 HC ACUTE/MED SURGE SEMI-PRIVATE ROOM: Mod: HCNC

## 2024-05-07 PROCEDURE — 80047 BASIC METABLC PNL IONIZED CA: CPT | Mod: HCNC

## 2024-05-07 PROCEDURE — 80307 DRUG TEST PRSMV CHEM ANLYZR: CPT | Mod: HCNC | Performed by: STUDENT IN AN ORGANIZED HEALTH CARE EDUCATION/TRAINING PROGRAM

## 2024-05-07 PROCEDURE — 99291 CRITICAL CARE FIRST HOUR: CPT | Mod: HCNC

## 2024-05-07 PROCEDURE — 94002 VENT MGMT INPAT INIT DAY: CPT | Mod: HCNC

## 2024-05-07 PROCEDURE — 83735 ASSAY OF MAGNESIUM: CPT | Mod: HCNC | Performed by: STUDENT IN AN ORGANIZED HEALTH CARE EDUCATION/TRAINING PROGRAM

## 2024-05-07 PROCEDURE — 99223 1ST HOSP IP/OBS HIGH 75: CPT | Mod: HCNC,GC,, | Performed by: STUDENT IN AN ORGANIZED HEALTH CARE EDUCATION/TRAINING PROGRAM

## 2024-05-07 PROCEDURE — 82803 BLOOD GASES ANY COMBINATION: CPT | Mod: HCNC

## 2024-05-07 PROCEDURE — 93010 ELECTROCARDIOGRAM REPORT: CPT | Mod: HCNC,,, | Performed by: INTERNAL MEDICINE

## 2024-05-07 PROCEDURE — 83605 ASSAY OF LACTIC ACID: CPT | Mod: HCNC

## 2024-05-07 PROCEDURE — 93005 ELECTROCARDIOGRAM TRACING: CPT | Mod: HCNC

## 2024-05-07 PROCEDURE — 96374 THER/PROPH/DIAG INJ IV PUSH: CPT | Mod: HCNC

## 2024-05-07 PROCEDURE — 84443 ASSAY THYROID STIM HORMONE: CPT | Mod: HCNC | Performed by: EMERGENCY MEDICINE

## 2024-05-07 PROCEDURE — 87086 URINE CULTURE/COLONY COUNT: CPT | Mod: HCNC | Performed by: EMERGENCY MEDICINE

## 2024-05-07 PROCEDURE — 84484 ASSAY OF TROPONIN QUANT: CPT | Mod: HCNC | Performed by: STUDENT IN AN ORGANIZED HEALTH CARE EDUCATION/TRAINING PROGRAM

## 2024-05-07 PROCEDURE — 94761 N-INVAS EAR/PLS OXIMETRY MLT: CPT | Mod: HCNC,XB

## 2024-05-07 PROCEDURE — 99900035 HC TECH TIME PER 15 MIN (STAT): Mod: HCNC

## 2024-05-07 PROCEDURE — 25500020 PHARM REV CODE 255: Mod: HCNC | Performed by: STUDENT IN AN ORGANIZED HEALTH CARE EDUCATION/TRAINING PROGRAM

## 2024-05-07 PROCEDURE — 85025 COMPLETE CBC W/AUTO DIFF WBC: CPT | Mod: HCNC | Performed by: EMERGENCY MEDICINE

## 2024-05-07 PROCEDURE — 87040 BLOOD CULTURE FOR BACTERIA: CPT | Mod: HCNC | Performed by: EMERGENCY MEDICINE

## 2024-05-07 PROCEDURE — 87077 CULTURE AEROBIC IDENTIFY: CPT | Mod: HCNC | Performed by: EMERGENCY MEDICINE

## 2024-05-07 PROCEDURE — 85610 PROTHROMBIN TIME: CPT | Mod: HCNC | Performed by: NURSE PRACTITIONER

## 2024-05-07 PROCEDURE — 0BH17EZ INSERTION OF ENDOTRACHEAL AIRWAY INTO TRACHEA, VIA NATURAL OR ARTIFICIAL OPENING: ICD-10-PCS | Performed by: STUDENT IN AN ORGANIZED HEALTH CARE EDUCATION/TRAINING PROGRAM

## 2024-05-07 PROCEDURE — 87088 URINE BACTERIA CULTURE: CPT | Mod: HCNC | Performed by: EMERGENCY MEDICINE

## 2024-05-07 PROCEDURE — 25000003 PHARM REV CODE 250: Mod: HCNC | Performed by: STUDENT IN AN ORGANIZED HEALTH CARE EDUCATION/TRAINING PROGRAM

## 2024-05-07 PROCEDURE — 5A1935Z RESPIRATORY VENTILATION, LESS THAN 24 CONSECUTIVE HOURS: ICD-10-PCS | Performed by: STUDENT IN AN ORGANIZED HEALTH CARE EDUCATION/TRAINING PROGRAM

## 2024-05-07 PROCEDURE — 87186 SC STD MICRODIL/AGAR DIL: CPT | Mod: HCNC | Performed by: EMERGENCY MEDICINE

## 2024-05-07 PROCEDURE — 63600175 PHARM REV CODE 636 W HCPCS: Mod: HCNC | Performed by: STUDENT IN AN ORGANIZED HEALTH CARE EDUCATION/TRAINING PROGRAM

## 2024-05-07 PROCEDURE — 82962 GLUCOSE BLOOD TEST: CPT | Mod: HCNC

## 2024-05-07 PROCEDURE — 27100171 HC OXYGEN HIGH FLOW UP TO 24 HOURS: Mod: HCNC

## 2024-05-07 PROCEDURE — 96365 THER/PROPH/DIAG IV INF INIT: CPT | Mod: HCNC

## 2024-05-07 PROCEDURE — 80053 COMPREHEN METABOLIC PANEL: CPT | Mod: HCNC | Performed by: EMERGENCY MEDICINE

## 2024-05-07 RX ORDER — PROPOFOL 10 MG/ML
0-50 INJECTION, EMULSION INTRAVENOUS CONTINUOUS
Status: DISCONTINUED | OUTPATIENT
Start: 2024-05-07 | End: 2024-05-08

## 2024-05-07 RX ORDER — FENTANYL CITRATE 50 UG/ML
50 INJECTION, SOLUTION INTRAMUSCULAR; INTRAVENOUS
Status: DISPENSED | OUTPATIENT
Start: 2024-05-07 | End: 2024-05-08

## 2024-05-07 RX ORDER — HEPARIN SODIUM,PORCINE/D5W 25000/250
0-40 INTRAVENOUS SOLUTION INTRAVENOUS CONTINUOUS
Status: DISCONTINUED | OUTPATIENT
Start: 2024-05-07 | End: 2024-05-08

## 2024-05-07 RX ORDER — DIPHENHYDRAMINE HYDROCHLORIDE 50 MG/ML
50 INJECTION INTRAMUSCULAR; INTRAVENOUS ONCE
Status: DISCONTINUED | OUTPATIENT
Start: 2024-05-07 | End: 2024-05-07

## 2024-05-07 RX ORDER — SODIUM BICARBONATE 650 MG/1
650 TABLET ORAL 3 TIMES DAILY
Status: DISCONTINUED | OUTPATIENT
Start: 2024-05-08 | End: 2024-05-08

## 2024-05-07 RX ORDER — MAGNESIUM SULFATE HEPTAHYDRATE 40 MG/ML
INJECTION, SOLUTION INTRAVENOUS
Status: DISCONTINUED
Start: 2024-05-07 | End: 2024-05-07 | Stop reason: WASHOUT

## 2024-05-07 RX ORDER — SODIUM CHLORIDE 0.9 % (FLUSH) 0.9 %
10 SYRINGE (ML) INJECTION
Status: DISCONTINUED | OUTPATIENT
Start: 2024-05-07 | End: 2024-05-21 | Stop reason: HOSPADM

## 2024-05-07 RX ORDER — SEVELAMER CARBONATE 800 MG/1
1600 TABLET, FILM COATED ORAL
Status: DISCONTINUED | OUTPATIENT
Start: 2024-05-08 | End: 2024-05-08

## 2024-05-07 RX ORDER — FAMOTIDINE 10 MG/ML
20 INJECTION INTRAVENOUS DAILY
Status: DISCONTINUED | OUTPATIENT
Start: 2024-05-08 | End: 2024-05-10

## 2024-05-07 RX ORDER — FUROSEMIDE 40 MG/1
40 TABLET ORAL 2 TIMES DAILY
Status: DISCONTINUED | OUTPATIENT
Start: 2024-05-08 | End: 2024-05-08

## 2024-05-07 RX ORDER — PHENYLEPHRINE HCL IN 0.9% NACL 1 MG/10 ML
SYRINGE (ML) INTRAVENOUS
Status: DISCONTINUED
Start: 2024-05-07 | End: 2024-05-07 | Stop reason: WASHOUT

## 2024-05-07 RX ORDER — CLOPIDOGREL BISULFATE 75 MG/1
75 TABLET ORAL DAILY
Status: DISCONTINUED | OUTPATIENT
Start: 2024-05-08 | End: 2024-05-08

## 2024-05-07 RX ORDER — ETOMIDATE 2 MG/ML
18 INJECTION INTRAVENOUS
Status: COMPLETED | OUTPATIENT
Start: 2024-05-07 | End: 2024-05-07

## 2024-05-07 RX ORDER — ATORVASTATIN CALCIUM 40 MG/1
80 TABLET, FILM COATED ORAL DAILY
Status: DISCONTINUED | OUTPATIENT
Start: 2024-05-08 | End: 2024-05-08

## 2024-05-07 RX ORDER — CARVEDILOL 6.25 MG/1
6.25 TABLET ORAL 2 TIMES DAILY
Status: DISCONTINUED | OUTPATIENT
Start: 2024-05-08 | End: 2024-05-08

## 2024-05-07 RX ORDER — GLUCAGON 1 MG
1 KIT INJECTION
Status: DISCONTINUED | OUTPATIENT
Start: 2024-05-07 | End: 2024-05-21 | Stop reason: HOSPADM

## 2024-05-07 RX ORDER — INSULIN ASPART 100 [IU]/ML
0-10 INJECTION, SOLUTION INTRAVENOUS; SUBCUTANEOUS EVERY 6 HOURS PRN
Status: DISCONTINUED | OUTPATIENT
Start: 2024-05-07 | End: 2024-05-21 | Stop reason: HOSPADM

## 2024-05-07 RX ORDER — ISOSORBIDE DINITRATE 20 MG/1
40 TABLET ORAL 3 TIMES DAILY
Status: DISCONTINUED | OUTPATIENT
Start: 2024-05-08 | End: 2024-05-08

## 2024-05-07 RX ORDER — DIPHENHYDRAMINE HYDROCHLORIDE 50 MG/ML
50 INJECTION INTRAMUSCULAR; INTRAVENOUS
Status: COMPLETED | OUTPATIENT
Start: 2024-05-07 | End: 2024-05-07

## 2024-05-07 RX ORDER — HEPARIN SODIUM 5000 [USP'U]/ML
7500 INJECTION, SOLUTION INTRAVENOUS; SUBCUTANEOUS EVERY 8 HOURS
Status: DISCONTINUED | OUTPATIENT
Start: 2024-05-08 | End: 2024-05-07

## 2024-05-07 RX ORDER — ROCURONIUM BROMIDE 10 MG/ML
70 INJECTION, SOLUTION INTRAVENOUS ONCE
Status: COMPLETED | OUTPATIENT
Start: 2024-05-07 | End: 2024-05-07

## 2024-05-07 RX ORDER — FENTANYL CITRATE 50 UG/ML
50 INJECTION, SOLUTION INTRAMUSCULAR; INTRAVENOUS
Status: DISCONTINUED | OUTPATIENT
Start: 2024-05-08 | End: 2024-05-09

## 2024-05-07 RX ADMIN — IOHEXOL 75 ML: 350 INJECTION, SOLUTION INTRAVENOUS at 09:05

## 2024-05-07 RX ADMIN — PROPOFOL 15 MCG/KG/MIN: 10 INJECTION, EMULSION INTRAVENOUS at 09:05

## 2024-05-07 RX ADMIN — ETOMIDATE 18 MG: 2 INJECTION INTRAVENOUS at 09:05

## 2024-05-07 RX ADMIN — DIPHENHYDRAMINE HYDROCHLORIDE 50 MG: 50 INJECTION, SOLUTION INTRAMUSCULAR; INTRAVENOUS at 09:05

## 2024-05-07 RX ADMIN — ROCURONIUM BROMIDE 70 MG: 10 INJECTION, SOLUTION INTRAVENOUS at 09:05

## 2024-05-08 PROBLEM — E44.0 MODERATE MALNUTRITION: Status: ACTIVE | Noted: 2024-05-08

## 2024-05-08 PROBLEM — Z99.11 ON MECHANICALLY ASSISTED VENTILATION: Status: ACTIVE | Noted: 2024-05-08

## 2024-05-08 LAB
ALBUMIN SERPL BCP-MCNC: 2.2 G/DL (ref 3.5–5.2)
ALP SERPL-CCNC: 139 U/L (ref 55–135)
ALT SERPL W/O P-5'-P-CCNC: 10 U/L (ref 10–44)
ANION GAP SERPL CALC-SCNC: 15 MMOL/L (ref 8–16)
APTT PPP: 37.8 SEC (ref 21–32)
APTT PPP: 42.1 SEC (ref 21–32)
APTT PPP: 62.8 SEC (ref 21–32)
ASCENDING AORTA: 3.4 CM
AST SERPL-CCNC: 22 U/L (ref 10–40)
AV INDEX (PROSTH): 0.47
AV MEAN GRADIENT: 4 MMHG
AV PEAK GRADIENT: 6 MMHG
AV VALVE AREA BY VELOCITY RATIO: 1.66 CM²
AV VALVE AREA: 1.53 CM²
AV VELOCITY RATIO: 0.51
BASOPHILS # BLD AUTO: 0.05 K/UL (ref 0–0.2)
BASOPHILS NFR BLD: 0.8 % (ref 0–1.9)
BILIRUB SERPL-MCNC: 0.9 MG/DL (ref 0.1–1)
BSA FOR ECHO PROCEDURE: 1.54 M2
BUN SERPL-MCNC: 59 MG/DL (ref 6–20)
CALCIUM SERPL-MCNC: 8.8 MG/DL (ref 8.7–10.5)
CHLORIDE SERPL-SCNC: 103 MMOL/L (ref 95–110)
CO2 SERPL-SCNC: 18 MMOL/L (ref 23–29)
CREAT SERPL-MCNC: 7.6 MG/DL (ref 0.5–1.4)
CV ECHO LV RWT: 0.24 CM
DIFFERENTIAL METHOD BLD: ABNORMAL
DOP CALC AO PEAK VEL: 1.26 M/S
DOP CALC AO VTI: 22.52 CM
DOP CALC LVOT AREA: 3.3 CM2
DOP CALC LVOT DIAMETER: 2.04 CM
DOP CALC LVOT PEAK VEL: 0.64 M/S
DOP CALC LVOT STROKE VOLUME: 34.5 CM3
DOP CALCLVOT PEAK VEL VTI: 10.56 CM
E/E' RATIO: 11.57 M/S
ECHO LV POSTERIOR WALL: 0.71 CM (ref 0.6–1.1)
EJECTION FRACTION: 15 %
EOSINOPHIL # BLD AUTO: 0 K/UL (ref 0–0.5)
EOSINOPHIL NFR BLD: 0.5 % (ref 0–8)
ERYTHROCYTE [DISTWIDTH] IN BLOOD BY AUTOMATED COUNT: 16.9 % (ref 11.5–14.5)
EST. GFR  (NO RACE VARIABLE): 5.8 ML/MIN/1.73 M^2
FRACTIONAL SHORTENING: 13 % (ref 28–44)
GLUCOSE SERPL-MCNC: 95 MG/DL (ref 70–110)
HCT VFR BLD AUTO: 33.2 % (ref 37–48.5)
HGB BLD-MCNC: 10.9 G/DL (ref 12–16)
IMM GRANULOCYTES # BLD AUTO: 0.03 K/UL (ref 0–0.04)
IMM GRANULOCYTES NFR BLD AUTO: 0.5 % (ref 0–0.5)
INTERVENTRICULAR SEPTUM: 0.6 CM (ref 0.6–1.1)
LA MAJOR: 6.18 CM
LA MINOR: 5.93 CM
LA WIDTH: 4.78 CM
LEFT ATRIUM SIZE: 3.94 CM
LEFT ATRIUM VOLUME INDEX MOD: 55.9 ML/M2
LEFT ATRIUM VOLUME INDEX: 68.2 ML/M2
LEFT ATRIUM VOLUME MOD: 79.33 CM3
LEFT ATRIUM VOLUME: 96.89 CM3
LEFT INTERNAL DIMENSION IN SYSTOLE: 5.11 CM (ref 2.1–4)
LEFT VENTRICLE DIASTOLIC VOLUME INDEX: 119.77 ML/M2
LEFT VENTRICLE DIASTOLIC VOLUME: 170.08 ML
LEFT VENTRICLE MASS INDEX: 98 G/M2
LEFT VENTRICLE SYSTOLIC VOLUME INDEX: 87.7 ML/M2
LEFT VENTRICLE SYSTOLIC VOLUME: 124.51 ML
LEFT VENTRICULAR INTERNAL DIMENSION IN DIASTOLE: 5.85 CM (ref 3.5–6)
LEFT VENTRICULAR MASS: 139.43 G
LV LATERAL E/E' RATIO: 8.1 M/S
LV SEPTAL E/E' RATIO: 20.25 M/S
LYMPHOCYTES # BLD AUTO: 1.2 K/UL (ref 1–4.8)
LYMPHOCYTES NFR BLD: 19.7 % (ref 18–48)
MAGNESIUM SERPL-MCNC: 2.2 MG/DL (ref 1.6–2.6)
MCH RBC QN AUTO: 31.5 PG (ref 27–31)
MCHC RBC AUTO-ENTMCNC: 32.8 G/DL (ref 32–36)
MCV RBC AUTO: 96 FL (ref 82–98)
MONOCYTES # BLD AUTO: 0.9 K/UL (ref 0.3–1)
MONOCYTES NFR BLD: 14.8 % (ref 4–15)
MV PEAK E VEL: 0.81 M/S
NEUTROPHILS # BLD AUTO: 3.8 K/UL (ref 1.8–7.7)
NEUTROPHILS NFR BLD: 63.7 % (ref 38–73)
NRBC BLD-RTO: 0 /100 WBC
OHS CV RV/LV RATIO: 0.91 CM
OHS QRS DURATION: 88 MS
OHS QTC CALCULATION: 457 MS
PHOSPHATE SERPL-MCNC: 3.7 MG/DL (ref 2.7–4.5)
PISA MRMAX VEL: 0.05 M/S
PISA TR MAX VEL: 2.1 M/S
PLATELET # BLD AUTO: 184 K/UL (ref 150–450)
PMV BLD AUTO: 11.8 FL (ref 9.2–12.9)
POCT GLUCOSE: 99 MG/DL (ref 70–110)
POTASSIUM SERPL-SCNC: 4.5 MMOL/L (ref 3.5–5.1)
PROT SERPL-MCNC: 7.5 G/DL (ref 6–8.4)
RA MAJOR: 5.93 CM
RA PRESSURE ESTIMATED: 8 MMHG
RA WIDTH: 5.24 CM
RBC # BLD AUTO: 3.46 M/UL (ref 4–5.4)
RIGHT VENTRICULAR END-DIASTOLIC DIMENSION: 5.3 CM
RV TB RVSP: 10 MMHG
SINUS: 2.7 CM
SODIUM SERPL-SCNC: 136 MMOL/L (ref 136–145)
STJ: 2.3 CM
TDI LATERAL: 0.1 M/S
TDI SEPTAL: 0.04 M/S
TDI: 0.07 M/S
TR MAX PG: 18 MMHG
TRICUSPID ANNULAR PLANE SYSTOLIC EXCURSION: 1.43 CM
TROPONIN I SERPL DL<=0.01 NG/ML-MCNC: 0.06 NG/ML (ref 0–0.03)
TROPONIN I SERPL DL<=0.01 NG/ML-MCNC: 0.08 NG/ML (ref 0–0.03)
TV REST PULMONARY ARTERY PRESSURE: 26 MMHG
WBC # BLD AUTO: 6 K/UL (ref 3.9–12.7)
Z-SCORE OF LEFT VENTRICULAR DIMENSION IN END DIASTOLE: 2.94
Z-SCORE OF LEFT VENTRICULAR DIMENSION IN END SYSTOLE: 5.02

## 2024-05-08 PROCEDURE — 83735 ASSAY OF MAGNESIUM: CPT | Mod: HCNC | Performed by: INTERNAL MEDICINE

## 2024-05-08 PROCEDURE — 85730 THROMBOPLASTIN TIME PARTIAL: CPT | Mod: HCNC | Performed by: INTERNAL MEDICINE

## 2024-05-08 PROCEDURE — 63600175 PHARM REV CODE 636 W HCPCS: Mod: HCNC | Performed by: INTERNAL MEDICINE

## 2024-05-08 PROCEDURE — 25000003 PHARM REV CODE 250: Mod: HCNC

## 2024-05-08 PROCEDURE — 27200966 HC CLOSED SUCTION SYSTEM: Mod: HCNC

## 2024-05-08 PROCEDURE — 63600175 PHARM REV CODE 636 W HCPCS: Mod: HCNC | Performed by: STUDENT IN AN ORGANIZED HEALTH CARE EDUCATION/TRAINING PROGRAM

## 2024-05-08 PROCEDURE — 84100 ASSAY OF PHOSPHORUS: CPT | Mod: HCNC | Performed by: INTERNAL MEDICINE

## 2024-05-08 PROCEDURE — 92610 EVALUATE SWALLOWING FUNCTION: CPT | Mod: HCNC

## 2024-05-08 PROCEDURE — 99223 1ST HOSP IP/OBS HIGH 75: CPT | Mod: HCNC,GC,, | Performed by: STUDENT IN AN ORGANIZED HEALTH CARE EDUCATION/TRAINING PROGRAM

## 2024-05-08 PROCEDURE — 63600175 PHARM REV CODE 636 W HCPCS: Mod: HCNC | Performed by: NURSE PRACTITIONER

## 2024-05-08 PROCEDURE — 99222 1ST HOSP IP/OBS MODERATE 55: CPT | Mod: HCNC,,, | Performed by: INTERNAL MEDICINE

## 2024-05-08 PROCEDURE — 84484 ASSAY OF TROPONIN QUANT: CPT | Mod: 91,HCNC | Performed by: STUDENT IN AN ORGANIZED HEALTH CARE EDUCATION/TRAINING PROGRAM

## 2024-05-08 PROCEDURE — 25000003 PHARM REV CODE 250: Mod: HCNC | Performed by: INTERNAL MEDICINE

## 2024-05-08 PROCEDURE — 27100171 HC OXYGEN HIGH FLOW UP TO 24 HOURS: Mod: HCNC

## 2024-05-08 PROCEDURE — 63600175 PHARM REV CODE 636 W HCPCS: Mod: HCNC

## 2024-05-08 PROCEDURE — 99900026 HC AIRWAY MAINTENANCE (STAT): Mod: HCNC

## 2024-05-08 PROCEDURE — 80053 COMPREHEN METABOLIC PANEL: CPT | Mod: HCNC | Performed by: INTERNAL MEDICINE

## 2024-05-08 PROCEDURE — 25000003 PHARM REV CODE 250: Mod: HCNC | Performed by: NURSE PRACTITIONER

## 2024-05-08 PROCEDURE — 20000000 HC ICU ROOM: Mod: HCNC

## 2024-05-08 PROCEDURE — 99900035 HC TECH TIME PER 15 MIN (STAT): Mod: HCNC

## 2024-05-08 PROCEDURE — 85025 COMPLETE CBC W/AUTO DIFF WBC: CPT | Mod: HCNC | Performed by: INTERNAL MEDICINE

## 2024-05-08 PROCEDURE — 94761 N-INVAS EAR/PLS OXIMETRY MLT: CPT | Mod: HCNC

## 2024-05-08 RX ORDER — HEPARIN SODIUM,PORCINE/D5W 25000/250
0-40 INTRAVENOUS SOLUTION INTRAVENOUS CONTINUOUS
Status: DISCONTINUED | OUTPATIENT
Start: 2024-05-08 | End: 2024-05-09

## 2024-05-08 RX ADMIN — VANCOMYCIN HYDROCHLORIDE 1500 MG: 1.5 INJECTION, POWDER, LYOPHILIZED, FOR SOLUTION INTRAVENOUS at 11:05

## 2024-05-08 RX ADMIN — HEPARIN SODIUM 12 UNITS/KG/HR: 10000 INJECTION, SOLUTION INTRAVENOUS at 01:05

## 2024-05-08 RX ADMIN — PROPOFOL 25 MCG/KG/MIN: 10 INJECTION, EMULSION INTRAVENOUS at 05:05

## 2024-05-08 RX ADMIN — MEROPENEM 1 G: 1 INJECTION INTRAVENOUS at 01:05

## 2024-05-08 RX ADMIN — PROPOFOL 50 MCG/KG/MIN: 10 INJECTION, EMULSION INTRAVENOUS at 12:05

## 2024-05-08 RX ADMIN — FENTANYL CITRATE 50 MCG: 50 INJECTION INTRAMUSCULAR; INTRAVENOUS at 12:05

## 2024-05-08 NOTE — CONSULTS
[facility] - Emergency Dept  Vascular Neurology  [facility]  Consult Note    Inpatient consult to Vascular Neurology  Consult performed by: Romulo Irby MD  Consult ordered by: Nitin Mcmahon MD        Assessment/Plan:     Patient is a 58 y.o. year old female with:    Encephalopathy  58 year old female w/ extensive medical history as below who presents with acute onset of altered mental status/decreased responsiveness onset around 1900. Has a history of episodes similar to this in the past. Patient was intubated and a stroke code was called. She reportedly did not seem to have any focal deficits prior to intubation. CT/CTA did not show large infarction or hemorrhage and no LVO was seen.     Patient re-examined at 2250 - remains intubated, now moving extremities, localizing to pain, no focal neurological deficits seen. GCS 9 [id number]    Recommendations:  -Low suspicion for acute neurovascular event causing patients presentation given normal CTA and the patient's history of similar presentations  -Can obtain MRI to further evaluate  -Please contact stroke team with any questions        STROKE DOCUMENTATION     Acute Stroke Times   Last Known Normal Date: 05/07/24  Last Known Normal Time: 1900  Symptom Onset Date: 05/07/24  Symptom Onset Time: 1900  Stroke Team Called Date: 05/07/24  Stroke Team Called Time: 2112  Stroke Team Arrival Date: 05/07/24  Stroke Team Arrival Time: 2120  CT Interpretation Time: 2122  Thrombolytic Therapy Recommended: No  CTA Interpretation Time: 2131  Thrombectomy Recommended: No    NIH Scale:  Reason Unable to Complete: Current or recent paralytic use (if available Train of Four exam showing < 4/4 twitches) (Unable to complete accurately as patient was just given paralytics and sedation for intubation)  1a. Level of Consciousness: 3-->Responds only with reflex motor or autonomic effects or totally unresponsive, flaccid, and areflexic  1b. LOC Questions: 2-->Answers  neither question correctly  1c. LOC Commands: 2-->Performs neither task correctly  2. Best Gaze: 0-->Normal  3. Visual: 0-->No visual loss  4. Facial Palsy: 3-->Complete paralysis of one or both sides (absence of facial movement in the upper and lower face)  5a. Motor Arm, Left: 4-->No movement  5b. Motor Arm, Right: 4-->No movement  6a. Motor Leg, Left: 4-->No movement  6b. Motor Leg, Right: 4-->No movement  7. Limb Ataxia: 0-->Absent  8. Sensory: 2-->Severe to total sensory loss, patient is not aware of being touched in the face, arm, and leg  9. Best Language: 3-->Mute, global aphasia, no usable speech or auditory comprehension  10. Dysarthria: (UN) Intubated or other physical barrier  11. Extinction and Inattention (formerly Neglect): 0-->No abnormality  Total (NIH Stroke Scale): 31    Modified Wright Score: 5  Carlos Coma Scale:    ABCD2 Score:    [id number] Score:   HAS -BLED Score:   ICH Score:   Hunt & Tavares Classification:       Thrombolysis Candidate? No, Current use of direct thrombin inhibitors (dabigatran) or direct factor Xa inhibitors within 48 hours    Delays to Thrombolysis?  Not Applicable    Interventional Revascularization Candidate?   Is the patient eligible for mechanical endovascular reperfusion (DAVID)?  No; No large vessel occlusion identified on imaging     Delays to Thrombectomy? Not Applicable    Hemorrhagic change of an Ischemic Stroke: Does this patient have an ischemic stroke with hemorrhagic changes? No     Subjective:     History of Present Illness:  Suyapa Connelly is a 58 y.o. female w/ extensive PMH including PVD, T2DM, CAROLIN, CAD s/p CABG, HFrEF, anemia, ESRD on HD, pAF (eliquis), bilateral AKA, and morbid obesity who presented to the ED on 5/7 for episode of unresponsiveness that started approximately 1 hour prior to arrival. Patient intubated and then a stroke code was called. Unable to obtain exam at time of stroke code given that paralytics were given for intubation. Per  chart review appears patient has had multiple episodes of similar presentations like this. Recently was attributed to home CNS depressants and anticholinergics.          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    Osteomyelitis     Osteomyelitis of left foot 04/30/2021    Ulcer of left foot     Vaginal delivery     x1     Past Surgical History:   Procedure Laterality Date    ABOVE-KNEE AMPUTATION Left 5/18/2021    Procedure: AMPUTATION, ABOVE KNEE;  Surgeon: Teddy Huber MD;  Location: [address];  Service: Vascular;  Laterality: Left;    ABOVE-KNEE AMPUTATION Right 3/18/2022    Procedure: AMPUTATION, ABOVE KNEE;  Surgeon: DAYNE Florez II, MD;  Location: [facility] OR [address] [place] [address];  Service: Vascular;  Laterality: Right;    Angiogram - Right Extremity Right 7/9/15    angiogram-left leg  10/6/15    ANGIOGRAPHY OF LOWER EXTREMITY Left 4/29/2021    Procedure: ANGIOGRAM, LOWER EXTREMITY;  Surgeon: Teddy Huber MD;  Location: [address];  Service: Vascular;  Laterality: Left;    BELOW KNEE AMPUTATION OF LOWER EXTREMITY Right 12/28/2021    Procedure: AMPUTATION, BELOW KNEE;  Surgeon: Kaitlyn Rojas MD;  Location: [facility];  Service: General;  Laterality: Right;    CATHETERIZATION OF BOTH LEFT AND RIGHT HEART N/A 12/18/2019    Procedure: CATHETERIZATION, HEART, BOTH LEFT AND RIGHT;  Surgeon: Que Fernando III, MD;  Location: [facility] CATH LAB;  Service: Cardiology;  Laterality: N/A;    CORONARY ANGIOGRAPHY N/A 12/18/2019    Procedure: ANGIOGRAM, CORONARY ARTERY;  Surgeon: Que Fernando III, MD;  Location: [facility] CATH LAB;  Service: Cardiology;  Laterality: N/A;    CORONARY ANGIOGRAPHY INCLUDING BYPASS GRAFTS WITH  CATHETERIZATION OF LEFT HEART N/A 7/28/2020    Procedure: ANGIOGRAM, CORONARY, INCLUDING BYPASS GRAFT, WITH LEFT HEART CATHETERIZATION, 9 am;  Surgeon: Rachel Easley MD;  Location: [facility] CATH LAB;  Service: Cardiology;  Laterality: N/A;    CORONARY ARTERY BYPASS GRAFT (CABG) N/A 1/14/2020    Procedure: CORONARY ARTERY BYPASS GRAFT (CABG) x 1     Off Pump;  Surgeon: Huang Altamirano MD;  Location: [facility] OR [address] [place] [address];  Service: Cardiovascular;  Laterality: N/A;    CREATION OF FEMORAL-TIBIAL ARTERY BYPASS Left 4/29/2021    Procedure: CREATION, BYPASS, ARTERIAL, FEMORAL TO ANTERIOR TIBIAL;  Surgeon: Teddy Huber MD;  [facility];  Service: Vascular;  Laterality: Left;    CREATION OF FEMOROPOPLITEAL ARTERIAL BYPASS USING GRAFT Left 8/18/2020    Procedure: CREATION, BYPASS, ARTERIAL, FEMORAL TO POPLITEAL, USING GRAFT, LEFT LOWER EXTREMITY;  Surgeon: Teddy Huber MD;  Location: [facility] OR;  Service: Vascular;  Laterality: Left;  REQUEST 7:15 A.M. START----COVID NEGATIVE ON 8/17  1ST CASE STARTKENYA PER LEANA ON 8/7/2020 @ 942AM-  RN PREOP 8/12/2020   T/S-----CLEARED BY CARDS-------PENDING INSURANCE    DEBRIDEMENT OF FOOT Left 9/8/2020    Procedure: DEBRIDEMENT, FOOT;  Surgeon: Rosio Mayes DPM;  Location: [facility] OR;  Service: Podiatry;  Laterality: Left;  request neoxx .   RN Pre Op 9-4-2020, Covid negative on 9/5/20. C A    DEBRIDEMENT OF FOOT  3/4/2021    Procedure: DEBRIDEMENT, FOOT;  Surgeon: Teddy Huber MD;  Location: [facility] OR;  Service: Vascular;;    DEBRIDEMENT OF FOOT Left 3/9/2021    Procedure: DEBRIDEMENT, FOOT, bone biopsy;  Surgeon: Rosio Mayes DPM;  Location: [facility] OR;  Service: Podiatry;  Laterality: Left;  Request neoxx---COVID IN AM  REQUESTING NOON START  RN Phone Pre op.On Blood thinners Plavix and Eliquis.  Covid am of surgery. C A    DEBRIDEMENT OF FOOT Left 5/4/2021    Procedure: DEBRIDEMENT, FOOT;  Surgeon: Farooq Morley DPM;  Location: [facility] OR 2ND FLR;   Service: Podiatry;  Laterality: Left;    INSERTION OF TUNNELED CENTRAL VENOUS HEMODIALYSIS CATHETER N/A 1/27/2020    Procedure: Insertion, Catheter, Central Venous, Hemodialysis;  Surgeon: ESTEBAN Gomez III, MD;  Location: [facility] CATH LAB;  Service: Peripheral Vascular;  Laterality: N/A;    INSERTION OF TUNNELED CENTRAL VENOUS HEMODIALYSIS CATHETER  5/10/2023    Procedure: Insertion, Catheter, Central Venous, Hemodialysis;  Surgeon: Romulo Queen MD;  Location: [facility] CATH LAB;  Service: Cardiology;;    PERCUTANEOUS TRANSLUMINAL ANGIOPLASTY N/A 3/4/2021    Procedure: PTA (ANGIOPLASTY, PERCUTANEOUS, TRANSLUMINAL);  Surgeon: Teddy Huber MD;  Location: [facility] OR;  Service: Vascular;  Laterality: N/A;    REMOVAL OF ARTERIOVENOUS GRAFT Left 5/27/2021    Procedure: REMOVAL, GRAFT, LEFT LOWER EXTREMITY, WOUND EXPLORATION;  Surgeon: Teddy Huber MD;  Location: [facility] OR 2ND FLR;  Service: Vascular;  Laterality: Left;    REMOVAL OF NAIL OF DIGIT Left 3/9/2021    Procedure: REMOVAL, NAIL, DIGIT;  Surgeon: Rosio Mayes DPM;  Location: [facility] OR;  Service: Podiatry;  Laterality: Left;    RIGHT HEART CATHETERIZATION Right 5/10/2023    Procedure: INSERTION, CATHETER, RIGHT HEART;  Surgeon: Romulo Queen MD;  Location: [facility] CATH LAB;  Service: Cardiology;  Laterality: Right;    THROMBECTOMY Left 3/4/2021    Procedure: THROMBECTOMY, LEFT LOWER EXTREMITY BYPASS GRAFT, ANGIOGRAM, POSSIBLE INTERVENTION, POSSIBLE LEFT LOWER EXTREMITY BYPASS;  Surgeon: Teddy Huber MD;  Location: [facility] OR;  Service: Vascular;  Laterality: Left;    THROMBECTOMY Left 4/29/2021    Procedure: GRAFT THROMBECTOMY, LEFT LOWER EXTREMITY;  Surgeon: Teddy Huber MD;  Location: [facility] OR 2ND FLR;  Service: Vascular;  Laterality: Left;  14.5 min  1179.85 mGy  341.01 Gycm2  240 ml dye    THROMBECTOMY  10/22/2021    Procedure: THROMBECTOMY;  Surgeon: Saad Arenas MD;  Location: [facility] CATH LAB/EP;  Service: Cardiology;;     Social  History     Tobacco Use    Smoking status: Former    Smokeless tobacco: Never   Substance Use Topics    Alcohol use: No    Drug use: Yes     Types: Marijuana     Comment: occassional     Review of patient's allergies indicates:   Allergen Reactions    Ciprofloxacin Itching    Contrast media      Kidney injury    Iodine      Kidney injury    Pcn [penicillins]      Rash; tolerated ceftriaxone on 1/13/20       Medications: I have reviewed the current medication administration record.    (Not in a hospital admission)      Review of Systems   Unable to perform ROS: Intubated   Psychiatric/Behavioral:  Positive for confusion.      Objective:     Vital Signs (Most Recent):  Temp: 99.1 °F (37.3 °C) (05/07/24 2157)  Pulse: 88 (05/07/24 2157)  Resp: (!) 27 (05/07/24 2157)  BP: 117/83 (05/07/24 2157)  SpO2: 98 % (05/07/24 2157)    Vital Signs Range (Last 24H):  Temp:  [98.8 °F (37.1 °C)-99.1 °F (37.3 °C)]   Pulse:  [88-99]   Resp:  [20-27]   BP: (107-137)/(56-83)   SpO2:  [96 %-100 %]        Physical Exam  Constitutional:       Appearance: She is ill-appearing.      Interventions: She is intubated.   Pulmonary:      Effort: She is intubated.   Musculoskeletal:      Comments: Bilateral BKA              Neurological Exam:   LOC: intubated and sedated and paralyzed  Attention Span: untestable  Language: Intubated  Articulation: Untestable due to intubation  Orientation: Untestable due to intubation  Motor: bilateral BKA, untestable given paralytic use for intubation  Sensation: untestable given intubated/sedated/paralyzed  Tone: Flaccid  LUE  and RUE      Laboratory:  CMP:   Recent Labs   Lab 05/07/24 2104   CALCIUM 9.0   ALBUMIN 2.7*   PROT 8.5*      K 5.1   CO2 23      BUN 59*   CREATININE 7.5*   ALKPHOS 164*   ALT 10   AST 20   BILITOT 0.8     CBC:   Recent Labs   Lab 05/07/24 2104   WBC 5.25   RBC 3.71*   HGB 11.8*   HCT 35.5*      MCV 96   MCH 31.8*   MCHC 33.2       Diagnostic Results:      Brain/Vessel  imaging:  CTA MP:     No CT findings of large territorial infarction.  Additional evaluation MRI of the brain may be obtained, as clinically warranted.     Unchanged distention of the ventricular system.     No high-grade stenosis or major vessel occlusion.    Cardiac Evaluation:   Results for orders placed during the hospital encounter of 05/01/23    Echo    Interpretation Summary  · The left ventricle is mildly enlarged with severely decreased systolic function. The estimated ejection fraction is 15%.  · There is severe left ventricular global hypokinesis.  · Normal right ventricular size with low normal right ventricular systolic function.  · Grade I left ventricular diastolic dysfunction.  · Biatrial enlargement.  · Mild-to-moderate mitral regurgitation.  · Severe tricuspid regurgitation.  · The estimated PA systolic pressure is 43 mmHg.  · Elevated central venous pressure (15 mmHg).        Romulo Irby MD  [facility]  Department of Vascular Neurology   [facility] Dept

## 2024-05-08 NOTE — ASSESSMENT & PLAN NOTE
-- Intubated in the setting of AMS   -- Enteral access obtained   -- Continue lung protective ventilation   -- Wean FiO2 for SpO2 > 92%   -- SAT / SBT when appropriate   -- Target RASS 0   -- GI and DVT ppx   -- RD consulted for TF recommendations

## 2024-05-08 NOTE — PLAN OF CARE
MICU DAILY GOALS     Family/Goals of care/Code Status   Code Status: Full Code    24H Vital Sign Range  Temp:  [96.8 °F (36 °C)-99.1 °F (37.3 °C)]   Pulse:  [69-99]   Resp:  [20-32]   BP: (107-137)/(55-83)   SpO2:  [85 %-100 %]      Shift Events (include procedures and significant events)   No acute events throughout shift, pt admitted this shift. Intubated and sedated, on heparin and prop gtts.    AWAKE RASS: Goal - RASS Goal: -4-->deep sedation  Actual - RASS (Christie Agitation-Sedation Scale): deep sedation    Restraint necessity: Removing medical devices   BREATHE SBT: Not attempted    Coordinate A & B, analgesics/sedatives Pain: managed   SAT: Not attempted   Delirium CAM-ICU: Overall CAM-ICU: Positive   Early(intubated/ Progressive (non-intubated) Mobility MOVE Screen (INTUBATED ONLY): Not attempted    Activity: Activity Management: Arm raise - L1   Feeding/Nutrition Diet order: Diet/Nutrition Received: NPO,     Thrombus DVT prophylaxis: VTE Required Core Measure: Pharmacological prophylaxis initiated/maintained   HOB Elevation Head of Bed (HOB) Positioning: HOB at 30 degrees   Ulcer Prophylaxis GI: yes   Glucose control managed Glycemic Management: blood glucose monitored   Skin Skin assessed during: Admit    Sacrum intact/not altered? No  Heels intact/not altered? Yes  Surgical wound? No    CHECK ONE!   (no altered skin or altered skin) and sub boxes:  [] No Altered Skin Integrity Present    []Prevention Measures Documented    [x] Altered Skin Integrity Present or Discovered   [x] LDA present in EPIC, daily doc completed              [] LDA added if not in EPIC (describe wound).                    When describing wound, do not stage, use descriptive words only.    [] Wound Image Taken (required on admit,                   transfer/discharge and every Tuesday)    Wound Care Consulted? Yes    4 EYES:  Attending Nurse (1st set of eyes): DEANGELO Pascal    Second RN/Staff Member (2nd set of eyes): DEANGELO Wiley    Bowel Function no issues    Indwelling Catheter Necessity      Urethral Catheter 05/07/24-Reason for Continuing Urinary Catheterization: Critically ill in ICU and requiring hourly monitoring of intake/output         Hemodialysis Catheter 05/25/23 1336 left internal jugular-Line Necessity Review: CRRT/HD     De-escalation Antibiotics No        VS and assessment per flow sheet, patient progressing towards goals as tolerated, plan of care reviewed with  Suyapa Connelly and family , all concerns addressed, will continue to monitor.

## 2024-05-08 NOTE — ED NOTES
I-STAT Chem-8+ Results:   Value Reference Range   Sodium 138 136-145 mmol/L   Potassium  5.1 3.5-5.1 mmol/L   Chloride 103  mmol/L   Ionized Calcium 1.04 1.06-1.42 mmol/L   CO2 (measured) 24 23-29 mmol/L   Glucose 134  mg/dL   BUN 56 6-30 mg/dL   Creatinine 8.9 0.5-1.4 mg/dL   Hematocrit 38 36-54%      MD aware.

## 2024-05-08 NOTE — ASSESSMENT & PLAN NOTE
Malnutrition Type:  Context: acute illness or injury  Level: moderate    Related to (etiology):   Inability to consume sufficient energy     Signs and Symptoms (as evidenced by):   Weight loss, NFPE    Malnutrition Characteristic Summary:  Weight Loss (Malnutrition): 10% in 6 months  Subcutaneous Fat (Malnutrition): mild depletion  Muscle Mass (Malnutrition): mild depletion    Interventions/Recommendations (treatment strategy):  Collaboration of nutrition care w/ other providers    Nutrition Diagnosis Status:   New

## 2024-05-08 NOTE — HOSPITAL COURSE
In the morning on reevaluation on SBT patient did well and she was extubated.  Nephrology consulted and HD completed.  ID consulted who recommended discontinuing IV Vancomycin and following up the urine culture results and just continuing with meropenem.  Reevaluated her known HFrEF with repeat Echo with similar EF of 15%.  SLP evaluated her for dysphagia which is progressively improving. PT/OT following for early mobility and rehabilitation evaluation.

## 2024-05-08 NOTE — ASSESSMENT & PLAN NOTE
-- Nephrology consulted. Appreciate assistance   -- Renally dose all medications   -- Avoid nephrotoxins   -- Kerns w strict I&O   -- MAP > 65   -- Hgb > 7

## 2024-05-08 NOTE — ED TRIAGE NOTES
"Suyapa Connelly, a 58 y.o. female presents to the ED w/ complaint of AMS.  Pt's spouse says pt became unresponsive `1 hour  PTA.  PT arrived to hospital unresponsive.      Triage note:  Chief Complaint   Patient presents with    Altered Mental Status     Per spouse pt went "unresponsive" x 1 hour ago. Pt continues to be unresponsive at triage. Able to maintain airway at this time. Glucose 151     Review of patient's allergies indicates:   Allergen Reactions    Ciprofloxacin Itching    Contrast media      Kidney injury    Iodine      Kidney injury    Pcn [penicillins]      Rash; tolerated ceftriaxone on 1/13/20     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    Osteomyelitis     Osteomyelitis of left foot 04/30/2021    Ulcer of left foot     Vaginal delivery     x1       "

## 2024-05-08 NOTE — CONSULTS
Patient seen and evaluated by critical care medicine. To be admitted to ICU for further management. Full H&P to follow.     BIA Jose, [facility]  Pulmonary Critical Care Medicine   05/07/2024

## 2024-05-08 NOTE — SUBJECTIVE & OBJECTIVE
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    Osteomyelitis     Osteomyelitis of left foot 04/30/2021    Ulcer of left foot     Vaginal delivery     x1       Past Surgical History:   Procedure Laterality Date    ABOVE-KNEE AMPUTATION Left 5/18/2021    Procedure: AMPUTATION, ABOVE KNEE;  Surgeon: Teddy Huber MD;  Location: [facility] OR [address] [place] [address];  Service: Vascular;  Laterality: Left;    ABOVE-KNEE AMPUTATION Right 3/18/2022    Procedure: AMPUTATION, ABOVE KNEE;  Surgeon: DAYNE Florez II, MD;  Location: [facility] OR [address] [place] [address];  Service: Vascular;  Laterality: Right;    Angiogram - Right Extremity Right 7/9/15    angiogram-left leg  10/6/15    ANGIOGRAPHY OF LOWER EXTREMITY Left 4/29/2021    Procedure: ANGIOGRAM, LOWER EXTREMITY;  Surgeon: Teddy Huber MD;  Location: [facility] OR [address] [place] [address];  Service: Vascular;  Laterality: Left;    BELOW KNEE AMPUTATION OF LOWER EXTREMITY Right 12/28/2021    Procedure: AMPUTATION, BELOW KNEE;  Surgeon: Kaitlyn Rojas MD;  Location: [facility] OR;  Service: General;  Laterality: Right;    CATHETERIZATION OF BOTH LEFT AND RIGHT HEART N/A 12/18/2019    Procedure: CATHETERIZATION, HEART, BOTH LEFT AND RIGHT;  Surgeon: Que Fernando III, MD;  Location: [facility] CATH LAB;  Service: Cardiology;  Laterality: N/A;    CORONARY ANGIOGRAPHY N/A 12/18/2019    Procedure: ANGIOGRAM, CORONARY ARTERY;  Surgeon: Que Fernando III, MD;  Location: [facility] CATH LAB;  Service: Cardiology;  Laterality: N/A;    CORONARY ANGIOGRAPHY INCLUDING BYPASS GRAFTS WITH CATHETERIZATION OF LEFT HEART N/A 7/28/2020    Procedure: ANGIOGRAM, CORONARY, INCLUDING BYPASS GRAFT, WITH LEFT HEART CATHETERIZATION, 9 am;  Surgeon: Rachel Easley MD;   Location: [facility] CATH LAB;  Service: Cardiology;  Laterality: N/A;    CORONARY ARTERY BYPASS GRAFT (CABG) N/A 1/14/2020    Procedure: CORONARY ARTERY BYPASS GRAFT (CABG) x 1     Off Pump;  Surgeon: Huang Altamirano MD;  Location: [facility] OR [address] [place] [address];  Service: Cardiovascular;  Laterality: N/A;    CREATION OF FEMORAL-TIBIAL ARTERY BYPASS Left 4/29/2021    Procedure: CREATION, BYPASS, ARTERIAL, FEMORAL TO ANTERIOR TIBIAL;  Surgeon: Teddy Huber MD;  Location: [facility] OR [address] [place] [address];  Service: Vascular;  Laterality: Left;    CREATION OF FEMOROPOPLITEAL ARTERIAL BYPASS USING GRAFT Left 8/18/2020    Procedure: CREATION, BYPASS, ARTERIAL, FEMORAL TO POPLITEAL, USING GRAFT, LEFT LOWER EXTREMITY;  Surgeon: Teddy Huber MD;  Location: [facility];  Service: Vascular;  Laterality: Left;  REQUEST 7:15 A.M. START----COVID NEGATIVE ON 8/17 1ST CASE STARTE PER LEANA ON 8/7/2020 @ 942AM-  RN PREOP 8/12/2020   T/S-----CLEARED BY CARDS-------PENDING INSURANCE    DEBRIDEMENT OF FOOT Left 9/8/2020    Procedure: DEBRIDEMENT, FOOT;  Surgeon: Rosio Mayes DPM;  Location: [facility];  Service: Podiatry;  Laterality: Left;  request neoxx .   RN Pre Op 9-4-2020, Covid negative on 9/5/20. C A    DEBRIDEMENT OF FOOT  3/4/2021    Procedure: DEBRIDEMENT, FOOT;  Surgeon: Teddy Huber MD;  Location: [facility] OR;  Service: Vascular;;    DEBRIDEMENT OF FOOT Left 3/9/2021    Procedure: DEBRIDEMENT, FOOT, bone biopsy;  Surgeon: Rosio Mayes DPM;  Location: [facility] OR;  Service: Podiatry;  Laterality: Left;  Request neoxx---COVID IN AM  REQUESTING NOON START  RN Phone Pre op.On Blood thinners Plavix and Eliquis.  Covid am of surgery. C A    DEBRIDEMENT OF FOOT Left 5/4/2021    Procedure: DEBRIDEMENT, FOOT;  Surgeon: Farooq Morley DPM;  Location: [address];  Service: Podiatry;  Laterality: Left;    INSERTION OF TUNNELED CENTRAL VENOUS HEMODIALYSIS CATHETER N/A 1/27/2020    Procedure: Insertion, Catheter, Central Venous,  Hemodialysis;  Surgeon: ESTEBAN Gomez III, MD;  Location: [facility] CATH LAB;  Service: Peripheral Vascular;  Laterality: N/A;    INSERTION OF TUNNELED CENTRAL VENOUS HEMODIALYSIS CATHETER  5/10/2023    Procedure: Insertion, Catheter, Central Venous, Hemodialysis;  Surgeon: Romulo Queen MD;  Location: [facility] CATH LAB;  Service: Cardiology;;    PERCUTANEOUS TRANSLUMINAL ANGIOPLASTY N/A 3/4/2021    Procedure: PTA (ANGIOPLASTY, PERCUTANEOUS, TRANSLUMINAL);  Surgeon: Teddy Huber MD;  Location: [facility] OR;  Service: Vascular;  Laterality: N/A;    REMOVAL OF ARTERIOVENOUS GRAFT Left 5/27/2021    Procedure: REMOVAL, GRAFT, LEFT LOWER EXTREMITY, WOUND EXPLORATION;  Surgeon: Teddy Huber MD;  Location: [facility] OR 2ND FLR;  Service: Vascular;  Laterality: Left;    REMOVAL OF NAIL OF DIGIT Left 3/9/2021    Procedure: REMOVAL, NAIL, DIGIT;  Surgeon: Rosio Mayes DPM;  Location: [facility] OR;  Service: Podiatry;  Laterality: Left;    RIGHT HEART CATHETERIZATION Right 5/10/2023    Procedure: INSERTION, CATHETER, RIGHT HEART;  Surgeon: Romulo Queen MD;  Location: [facility] CATH LAB;  Service: Cardiology;  Laterality: Right;    THROMBECTOMY Left 3/4/2021    Procedure: THROMBECTOMY, LEFT LOWER EXTREMITY BYPASS GRAFT, ANGIOGRAM, POSSIBLE INTERVENTION, POSSIBLE LEFT LOWER EXTREMITY BYPASS;  Surgeon: Teddy Huber MD;  Location: [facility] OR;  Service: Vascular;  Laterality: Left;    THROMBECTOMY Left 4/29/2021    Procedure: GRAFT THROMBECTOMY, LEFT LOWER EXTREMITY;  Surgeon: Teddy Huber MD;  Location: [facility] OR 2ND FLR;  Service: Vascular;  Laterality: Left;  14.5 min  1179.85 mGy  341.01 Gycm2  240 ml dye    THROMBECTOMY  10/22/2021    Procedure: THROMBECTOMY;  Surgeon: Saad Arenas MD;  Location: [facility] CATH LAB/EP;  Service: Cardiology;;       Review of patient's allergies indicates:   Allergen Reactions    Ciprofloxacin Itching    Contrast media      Kidney injury    Iodine      Kidney injury    Pcn  [penicillins]      Rash; tolerated ceftriaxone on 1/13/20     Current Facility-Administered Medications   Medication Frequency    dextrose 10% bolus 125 mL 125 mL PRN    dextrose 10% bolus 250 mL 250 mL PRN    famotidine (PF) injection 20 mg Daily    fentaNYL 50 mcg/mL injection 50 mcg Q1H PRN    glucagon (human recombinant) injection 1 mg PRN    heparin 25,000 units in dextrose 5% (100 units/ml) IV bolus from bag LOW INTENSITY nomogram - [facility] PRN    heparin 25,000 units in dextrose 5% (100 units/ml) IV bolus from bag LOW INTENSITY nomogram - [facility] PRN    heparin 25,000 units in dextrose 5% 250 mL (100 units/mL) infusion LOW INTENSITY nomogram - [facility] Continuous    insulin aspart U-100 pen 0-10 Units Q6H PRN    [START ON 5/9/2024] meropenem (MERREM) 500 mg in sodium chloride 0.9 % 100 mL IVPB (MB+) Q12H    sodium chloride 0.9% flush 10 mL PRN    vancomycin - pharmacy to dose pharmacy to manage frequency     Family History       Problem Relation (Age of Onset)    Diabetes Mother, Father, Paternal Grandmother    Heart disease Maternal Grandmother    No Known Problems Maternal Grandfather, Paternal Grandfather          Tobacco Use    Smoking status: Former    Smokeless tobacco: Never   Substance and Sexual Activity    Alcohol use: No    Drug use: Yes     Types: Marijuana     Comment: occassional    Sexual activity: Yes     Partners: Male     Review of Systems   Unable to perform ROS: Mental status change     Objective:     Vital Signs (Most Recent):  Temp: 98.1 °F (36.7 °C) (05/08/24 1000)  Pulse: 84 (05/08/24 1000)  Resp: 17 (05/08/24 1000)  BP: 121/69 (05/08/24 1000)  SpO2: 100 % (05/08/24 1000) Vital Signs (24h Range):  Temp:  [96.8 °F (36 °C)-99.1 °F (37.3 °C)] 98.1 °F (36.7 °C)  Pulse:  [69-99] 84  Resp:  [] 17  SpO2:  [85 %-100 %] 100 %  BP: (107-139)/(55-83) 121/69     Weight: 69.8 kg (153 lb 14.1 oz) (05/08/24 1031)  Body mass index is 46.96 kg/m².  Body surface area is 1.54 meters squared.    I/O last 3  completed shifts:  In: 343.5 [I.V.:215.8; IV Piggyback:127.6]  Out: 365 [Urine:365]     Physical Exam  Vitals and nursing note reviewed.   Constitutional:       General: She is not in acute distress.     Appearance: She is ill-appearing.   HENT:      Head: Normocephalic and atraumatic.      Mouth/Throat:      Mouth: Mucous membranes are moist.      Pharynx: No oropharyngeal exudate or posterior oropharyngeal erythema.   Eyes:      General: No scleral icterus.        Right eye: No discharge.         Left eye: No discharge.      Extraocular Movements: Extraocular movements intact.      Conjunctiva/sclera: Conjunctivae normal.   Cardiovascular:      Rate and Rhythm: Normal rate and regular rhythm.      Heart sounds: No murmur heard.     No friction rub. No gallop.   Pulmonary:      Effort: Pulmonary effort is normal. No respiratory distress.      Breath sounds: Normal breath sounds. No wheezing or rales.   Abdominal:      General: Bowel sounds are normal. There is no distension.      Palpations: Abdomen is soft.      Tenderness: There is no abdominal tenderness.   Musculoskeletal:         General: Deformity (bilateral AKA) present.      Cervical back: Normal range of motion and neck supple.   Skin:     General: Skin is warm and dry.   Neurological:      Mental Status: She is disoriented.      Comments: alert          Significant Labs:  CBC:   Recent Labs   Lab 05/08/24  0130   WBC 6.00   RBC 3.46*   HGB 10.9*   HCT 33.2*      MCV 96   MCH 31.5*   MCHC 32.8     CMP:   Recent Labs   Lab 05/08/24  0600   GLU 95   CALCIUM 8.8   ALBUMIN 2.2*   PROT 7.5      K 4.5   CO2 18*      BUN 59*   CREATININE 7.6*   ALKPHOS 139*   ALT 10   AST 22   BILITOT 0.9

## 2024-05-08 NOTE — H&P
[facility] - Emergency Dept  Critical Care Medicine  History & Physical    Patient Name: Suyapa Connelly  MRN: [id number]  Admission Date: 5/7/2024  Hospital Length of Stay: 1 days  Code Status: Full Code  Attending Physician: Onofre Spann MD   Primary Care Provider: Magen Christensen MD   Principal Problem: Severe sepsis    Subjective:     HPI:  Ms. Suyapa Connelly is a 57 y/o F with hx of severe PVD ultimately requiring bilateral LE AKAs, ESRD on HD, IDDM2, CAD s/p CABG, Afib on eliquis, debility who initially presented to the ED via EMS after an episode of unresponsiveness at home. Patient was apparently at her mental baseline this morning, when she suddenly became unresponsive per . EMS was called and were unable to arouse patient. She was brought into the ED and intubated shortly after arrival. Of note, patient has had 6 different hospitalizations over the past year related to encephalopathy. Patient was most recently admitted 3/22-3/23/24 for evaluation of steady cognitive decline over the past year. It appears patient had declined over the past year to the extent that she is unable to perform independent ADLs.  not ready for NH placement as of yet.    In the ED, patient afebrile, SBP 120s, HR 80s, intubated at 40% FiO2 Peep 5. CBC grossly unchanged from baseline, no leukocytosis or worsening anemia. CMP without significant electrolyte abnormalities, BUN 59, BGL 130s. Lactic wnl. UA c/f infection with >100 wbc and many bacteria. UDS pan-negative. pH wnl and no hypercarbia on VBG. Patient admitted to MICU for continued management.     Hospital/ICU Course:  No notes on file     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     Osteomyelitis     Osteomyelitis of left foot 04/30/2021    Ulcer of left foot     Vaginal delivery     x1       Past Surgical History:   Procedure Laterality Date    ABOVE-KNEE AMPUTATION Left 5/18/2021    Procedure: AMPUTATION, ABOVE KNEE;  Surgeon: Teddy Huber MD;  Location: [address];  Service: Vascular;  Laterality: Left;    ABOVE-KNEE AMPUTATION Right 3/18/2022    Procedure: AMPUTATION, ABOVE KNEE;  Surgeon: DAYNE Florez II, MD;  Location: [address];  Service: Vascular;  Laterality: Right;    Angiogram - Right Extremity Right 7/9/15    angiogram-left leg  10/6/15    ANGIOGRAPHY OF LOWER EXTREMITY Left 4/29/2021    Procedure: ANGIOGRAM, LOWER EXTREMITY;  Surgeon: Teddy Huber MD;  Location: [address];  Service: Vascular;  Laterality: Left;    BELOW KNEE AMPUTATION OF LOWER EXTREMITY Right 12/28/2021    Procedure: AMPUTATION, BELOW KNEE;  Surgeon: Kaitlyn Rojas MD;  Location: [facility];  Service: General;  Laterality: Right;    CATHETERIZATION OF BOTH LEFT AND RIGHT HEART N/A 12/18/2019    Procedure: CATHETERIZATION, HEART, BOTH LEFT AND RIGHT;  Surgeon: Que Fernando III, MD;  Location: [facility] CATH LAB;  Service: Cardiology;  Laterality: N/A;    CORONARY ANGIOGRAPHY N/A 12/18/2019    Procedure: ANGIOGRAM, CORONARY ARTERY;  Surgeon: Que Fernando III, MD;  Location: [facility] CATH LAB;  Service: Cardiology;  Laterality: N/A;    CORONARY ANGIOGRAPHY INCLUDING BYPASS GRAFTS WITH CATHETERIZATION OF LEFT HEART N/A 7/28/2020    Procedure: ANGIOGRAM, CORONARY, INCLUDING BYPASS GRAFT, WITH LEFT HEART CATHETERIZATION, 9 am;  Surgeon: Rachel Easley MD;  Location: [facility] CATH LAB;  Service: Cardiology;  Laterality: N/A;    CORONARY ARTERY BYPASS GRAFT (CABG) N/A 1/14/2020    Procedure: CORONARY ARTERY BYPASS GRAFT (CABG) x 1     Off Pump;  Surgeon: Huang Altamirano MD;  Location: [address];  Service: Cardiovascular;  Laterality: N/A;    CREATION OF FEMORAL-TIBIAL  ARTERY BYPASS Left 4/29/2021    Procedure: CREATION, BYPASS, ARTERIAL, FEMORAL TO ANTERIOR TIBIAL;  Surgeon: Teddy Huber MD;  [facility];  Service: Vascular;  Laterality: Left;    CREATION OF FEMOROPOPLITEAL ARTERIAL BYPASS USING GRAFT Left 8/18/2020    Procedure: CREATION, BYPASS, ARTERIAL, FEMORAL TO POPLITEAL, USING GRAFT, LEFT LOWER EXTREMITY;  Surgeon: Teddy Huber MD;  Location: [facility] OR;  Service: Vascular;  Laterality: Left;  REQUEST 7:15 A.M. START----COVID NEGATIVE ON 8/17  1ST CASE STARTE PER LEANA ON 8/7/2020 @ 942AM-LO  RN PREOP 8/12/2020   T/S-----CLEARED BY CARDS-------PENDING INSURANCE    DEBRIDEMENT OF FOOT Left 9/8/2020    Procedure: DEBRIDEMENT, FOOT;  Surgeon: Rosio Mayes DPM;  Location: [facility] OR;  Service: Podiatry;  Laterality: Left;  request neoxx .   RN Pre Op 9-4-2020, Covid negative on 9/5/20. C A    DEBRIDEMENT OF FOOT  3/4/2021    Procedure: DEBRIDEMENT, FOOT;  Surgeon: Teddy Huber MD;  Location: [facility] OR;  Service: Vascular;;    DEBRIDEMENT OF FOOT Left 3/9/2021    Procedure: DEBRIDEMENT, FOOT, bone biopsy;  Surgeon: Rosio Mayes DPM;  Location: [facility] OR;  Service: Podiatry;  Laterality: Left;  Request neoxx---COVID IN AM  REQUESTING NOON START  RN Phone Pre op.On Blood thinners Plavix and Eliquis.  Covid am of surgery. C A    DEBRIDEMENT OF FOOT Left 5/4/2021    Procedure: DEBRIDEMENT, FOOT;  Surgeon: Farooq Morley DPM;  Location: [facility] OR 2ND FLR;  Service: Podiatry;  Laterality: Left;    INSERTION OF TUNNELED CENTRAL VENOUS HEMODIALYSIS CATHETER N/A 1/27/2020    Procedure: Insertion, Catheter, Central Venous, Hemodialysis;  Surgeon: ESTEBAN Gomez III, MD;  Location: [facility] CATH LAB;  Service: Peripheral Vascular;  Laterality: N/A;    INSERTION OF TUNNELED CENTRAL VENOUS HEMODIALYSIS CATHETER  5/10/2023    Procedure: Insertion, Catheter, Central Venous, Hemodialysis;  Surgeon: Romulo Queen MD;  Location: [facility];  Service:  Cardiology;;    PERCUTANEOUS TRANSLUMINAL ANGIOPLASTY N/A 3/4/2021    Procedure: PTA (ANGIOPLASTY, PERCUTANEOUS, TRANSLUMINAL);  Surgeon: Teddy Huber MD;  Location: [facility] OR;  Service: Vascular;  Laterality: N/A;    REMOVAL OF ARTERIOVENOUS GRAFT Left 5/27/2021    Procedure: REMOVAL, GRAFT, LEFT LOWER EXTREMITY, WOUND EXPLORATION;  Surgeon: Teddy Huber MD;  Location: [facility] OR 2ND FLR;  Service: Vascular;  Laterality: Left;    REMOVAL OF NAIL OF DIGIT Left 3/9/2021    Procedure: REMOVAL, NAIL, DIGIT;  Surgeon: Rosio Mayes DPM;  Location: [facility] OR;  Service: Podiatry;  Laterality: Left;    RIGHT HEART CATHETERIZATION Right 5/10/2023    Procedure: INSERTION, CATHETER, RIGHT HEART;  Surgeon: Romulo Queen MD;  Location: [facility] CATH LAB;  Service: Cardiology;  Laterality: Right;    THROMBECTOMY Left 3/4/2021    Procedure: THROMBECTOMY, LEFT LOWER EXTREMITY BYPASS GRAFT, ANGIOGRAM, POSSIBLE INTERVENTION, POSSIBLE LEFT LOWER EXTREMITY BYPASS;  Surgeon: Teddy Huber MD;  Location: [facility] OR;  Service: Vascular;  Laterality: Left;    THROMBECTOMY Left 4/29/2021    Procedure: GRAFT THROMBECTOMY, LEFT LOWER EXTREMITY;  Surgeon: Teddy Huber MD;  Location: [facility] OR 2ND FLR;  Service: Vascular;  Laterality: Left;  14.5 min  1179.85 mGy  341.01 Gycm2  240 ml dye    THROMBECTOMY  10/22/2021    Procedure: THROMBECTOMY;  Surgeon: Saad Arenas MD;  Location: [facility] CATH LAB/EP;  Service: Cardiology;;       Review of patient's allergies indicates:   Allergen Reactions    Ciprofloxacin Itching    Contrast media      Kidney injury    Iodine      Kidney injury    Pcn [penicillins]      Rash; tolerated ceftriaxone on 1/13/20       Family History       Problem Relation (Age of Onset)    Diabetes Mother, Father, Paternal Grandmother    Heart disease Maternal Grandmother    No Known Problems Maternal Grandfather, Paternal Grandfather          Tobacco Use    Smoking status: Former    Smokeless tobacco:  Never   Substance and Sexual Activity    Alcohol use: No    Drug use: Yes     Types: Marijuana     Comment: occassional    Sexual activity: Yes     Partners: Male      Review of Systems   Unable to perform ROS: Intubated     Objective:     Vital Signs (Most Recent):  Temp: 99.1 °F (37.3 °C) (05/07/24 2300)  Pulse: 89 (05/07/24 2300)  Resp: (!) 21 (05/07/24 2300)  BP: 131/73 (05/07/24 2300)  SpO2: 97 % (05/07/24 2300) Vital Signs (24h Range):  Temp:  [98.8 °F (37.1 °C)-99.1 °F (37.3 °C)] 99.1 °F (37.3 °C)  Pulse:  [87-99] 89  Resp:  [20-27] 21  SpO2:  [96 %-100 %] 97 %  BP: (107-137)/(56-83) 131/73   Weight: 71.7 kg (158 lb)  Body mass index is 48.21 kg/m².    No intake or output data in the 24 hours ending 05/07/24 2322       Physical Exam  Vitals and nursing note reviewed.   Constitutional:       General: She is not in acute distress.     Appearance: She is ill-appearing. She is not toxic-appearing or diaphoretic.   HENT:      Head: Normocephalic and atraumatic.      Mouth/Throat:      Mouth: Mucous membranes are dry.      Comments: Very poor dentition  Eyes:      Extraocular Movements: Extraocular movements intact.      Conjunctiva/sclera: Conjunctivae normal.      Pupils: Pupils are equal, round, and reactive to light.   Cardiovascular:      Rate and Rhythm: Normal rate and regular rhythm.      Pulses: Normal pulses.      Heart sounds: Normal heart sounds. No murmur heard.     No friction rub. No gallop.   Pulmonary:      Comments: Intubated  Bilateral breath sounds  Genitourinary:     Comments: Cloudy urine present in snow bag  Musculoskeletal:      Comments: B/t AKA noted  L upper chest TDC noted   Skin:     General: Skin is warm and dry.   Neurological:      Comments: Patient awake on the vent, tracks and follows commands            Vents:  Vent Mode: A/C (05/07/24 2114)  Ventilator Initiated: Yes (05/07/24 2114)  Set Rate: 18 BPM (05/07/24 2114)  Vt Set: 350 mL (05/07/24 2114)  PEEP/CPAP: 5 cmH20 (05/07/24  2114)  Oxygen Concentration (%): 30 (05/07/24 2300)  Peak Airway Pressure: 20 cmH20 (05/07/24 2114)  Plateau Pressure: 0 cmH20 (05/07/24 2114)  Total Ve: 8.44 L/m (05/07/24 2114)  Negative Inspiratory Force (cm H2O): 0 (05/07/24 2114)  F/VT Ratio<105 (RSBI): (!) 66.67 (05/07/24 2114)  Lines/Drains/Airways       Central Venous Catheter Line  Duration                  Hemodialysis Catheter 05/25/23 1336 left internal jugular 348 days              Airway  Duration                  Airway - Non-Surgical 05/07/24 2110 Endotracheal Tube <1 day              Peripheral Intravenous Line  Duration                  Peripheral IV - Single Lumen 05/07/24 2100 20 G Anterior;Right Hand <1 day         Peripheral IV - Single Lumen 05/07/24 2106 20 G Anterior;Left Wrist <1 day                  Significant Labs:    CBC/Anemia Profile:  Recent Labs   Lab 05/07/24 2059 05/07/24 2104   WBC  --  5.25   HGB  --  11.8*   HCT 38 35.5*   PLT  --  199   MCV  --  96   RDW  --  16.8*        Chemistries:  Recent Labs   Lab 05/07/24 2104      K 5.1      CO2 23   BUN 59*   CREATININE 7.5*   CALCIUM 9.0   ALBUMIN 2.7*   PROT 8.5*   BILITOT 0.8   ALKPHOS 164*   ALT 10   AST 20   MG 2.2       CMP:   Recent Labs   Lab 05/07/24 2104      K 5.1      CO2 23   *   BUN 59*   CREATININE 7.5*   CALCIUM 9.0   PROT 8.5*   ALBUMIN 2.7*   BILITOT 0.8   ALKPHOS 164*   AST 20   ALT 10   ANIONGAP 13     Lactic Acid:   Recent Labs   Lab 05/07/24 2104   LACTATE 1.3     Urine Studies:   Recent Labs   Lab 05/07/24 2223   COLORU [place]*   APPEARANCEUA Ex.Turbid   PHUR 6.0   SPECGRAV 1.015   PROTEINUA 3+*   GLUCUA Negative   KETONESU Negative   BILIRUBINUA Negative   OCCULTUA 3+*   NITRITE Negative   LEUKOCYTESUR 3+*   RBCUA 51*   WBCUA >100*   BACTERIA [place]*   SQUAMEPITHEL 1   HYALINECASTS 1       Significant Imaging: I have reviewed all pertinent imaging results/findings within the past 24 hours.    X-Ray Chest AP Portable   Final  Result      New devices as above.      No new infiltrate or effusion with decreased size of the cardiac silhouette.         Electronically signed by: Rush Deal   Date:    05/07/2024   Time:    22:22      CTA STROKE MULTI-PHASE   Final Result      No CT findings of large territorial infarction.  Additional evaluation MRI of the brain may be obtained, as clinically warranted.      Unchanged distention of the ventricular system.      No high-grade stenosis or major vessel occlusion.      Electronically signed by resident: Екатерина Peck   Date:    05/07/2024   Time:    21:45      Electronically signed by: Chele Kumar MD   Date:    05/07/2024   Time:    22:16      MRI Brain Without Contrast    (Results Pending)       Assessment/Plan:     Neuro  Encephalopathy  -- Vascular neurology consulted. Appreciate assistance   -- CTH negative   -- Follow up MRI brain   -- Tox negative  -- Delirium precautions   -- Replace electrolytes to keep Mg > 2, Phos > 3, and K > 4  -- Nephrology consulted for ESRD management   -- RD consulted for TF recommendations  -- Further management per severe sepsis     Psychiatric  CAROLIN (generalized anxiety disorder)  -- Holding home sertraline and trazodone at this time given AMS. Will resume when clinically indicated    Pulmonary  On mechanically assisted ventilation  -- Intubated in the setting of AMS   -- Enteral access obtained   -- Continue lung protective ventilation   -- Wean FiO2 for SpO2 > 92%   -- SAT / SBT when appropriate   -- Target RASS 0   -- GI and DVT ppx   -- RD consulted for TF recommendations     Cardiac/Vascular  Chronic combined systolic and diastolic heart failure  -- Continue GDMT    Paroxysmal atrial fibrillation  [id number] Score: 3    6 High Risk:18.2% if no warfarin  5 High Risk: 12.5% if no warfarin  4 High Risk: 8.5% if no warfarin  3 High Risk: 5.9% if no warfarin  2 Intermediate Risk: 4% if no warfarin  1 Intermediate Risk: 2.8% if no warfarin    -- Continue  home Coreg   -- Heparin gtt per nanogram while intubated   -- PT/INR   -- Trend CBCs  -- Continuous telemetry   -- EKG for acute changes   -- Keep Mg > 2, Phos > 3, and K > 4      CAD (coronary artery disease)  -- Continue Plavix + Statin therapy    Peripheral vascular disease  -- Extensive PVD ultimately requiring bilateral AKA  -- Continue home lipitor and plavix while admitted     Renal/  ESRD (end stage renal disease)  -- Nephrology consulted. Appreciate assistance   -- Renally dose all medications   -- Avoid nephrotoxins   -- Kerns w strict I&O   -- MAP > 65   -- Hgb > 7    ID  * Severe sepsis  This patient does have evidence of infective focus  My overall impression is sepsis.  Source: Urinary Tract    Antibiotics given-   Antibiotics (72h ago, onward)      Start     Stop Route Frequency Ordered    05/07/24 2252  meropenem (MERREM) 1 g in sodium chloride 0.9 % 100 mL IVPB (MB+)         -- IV Every 24 hours (non-standard times) 05/07/24 2233          Latest lactate reviewed-  Recent Labs   Lab 05/07/24 2104 05/07/24 2125   LACTATE 1.3  --    POCLAC  --  1.42     Organ dysfunction indicated by Encephalopathy    Fluid challenge Contraindicated- Fluid bolus is contraindicated in this patient due to End Stage Renal Disease     Post- resuscitation assessment No - Post resuscitation assessment not needed     Will Not start Pressors- Levophed for MAP of 65  Source control achieved by:     -- Continue BSA   -- Follow up infectious work up   -- MAP > 65    Oncology  Anemia due to chronic kidney disease, on chronic dialysis  Recent Labs   Lab 05/07/24 2104   WBC 5.25   RBC 3.71*   HGB 11.8*   HCT 35.5*      MCV 96   MCH 31.8*   MCHC 33.2     -- Type and screen   -- CBC daily   -- Transfuse for hgb < 7    Endocrine  Type 2 diabetes mellitus with hyperglycemia, with long-term current use of insulin  Patient's FSGs are controlled on current medication regimen.  Last A1c reviewed-   Lab Results   Component Value  Date    HGBA1C 8.0 (H) 04/24/2024     Most recent fingerstick glucose reviewed-   Recent Labs   Lab 05/07/24 2046   POCTGLUCOSE 151*     Current correctional scale  Low  Maintain anti-hyperglycemic dose as follows-   Antihyperglycemics (From admission, onward)      None          Hold Oral hypoglycemics while patient is in the hospital.      Other  Impaired functional mobility and endurance  -- PT / OT when clinically appropriate   -- Has previously declined nursing placement         Critical Care Daily Checklist:    A: Awake: RASS Goal/Actual Goal:    Actual:     B: Spontaneous Breathing Trial Performed?     C: SAT & SBT Coordinated?  In AM                       D: Delirium: CAM-ICU     E: Early Mobility Performed? No   F: Feeding Goal:    Status:     Current Diet Order   Procedures    Diet NPO      AS: Analgesia/Sedation Wean Prop for RASS 0    T: Thromboembolic Prophylaxis Heparin    H: HOB > 300 Yes   U: Stress Ulcer Prophylaxis (if needed) Yes   G: Glucose Control < 180 goal    B: Bowel Function     I: Indwelling Catheter (Lines & Snow) Necessity ETT, OGT, R chest CVC, snow, PIV    D: De-escalation of Antimicrobials/Pharmacotherapies Continue Meropenem. Follow up ID recommendations     Plan for the day/ETD Admit to ICU for AMS and severe sepsis     Code Status:  Family/Goals of Care: Full Code  Y     Critical Care Time: 60 minutes  Critical secondary to Patient has a condition that poses threat to life and bodily function: severe sepsis and altered mental status requiring intubation and mechanical ventilation    Plan of care to be discussed with Dr. Spann and attestation to follow      Critical care was time spent personally by me on the following activities: development of treatment plan with patient or surrogate and bedside caregivers, discussions with consultants, evaluation of patient's response to treatment, examination of patient, ordering and performing treatments and interventions, ordering and review  of laboratory studies, ordering and review of radiographic studies, pulse oximetry, re-evaluation of patient's condition. This critical care time did not overlap with that of any other provider or involve time for any procedures.     Bharath Nicolas NP  Critical Care Medicine  [facility] - Emergency Dept

## 2024-05-08 NOTE — ASSESSMENT & PLAN NOTE
[id number] Score: 3    6 High Risk:18.2% if no warfarin  5 High Risk: 12.5% if no warfarin  4 High Risk: 8.5% if no warfarin  3 High Risk: 5.9% if no warfarin  2 Intermediate Risk: 4% if no warfarin  1 Intermediate Risk: 2.8% if no warfarin    -- Continue home Coreg   -- Heparin gtt per nanogram while intubated   -- PT/INR   -- Trend CBCs  -- Continuous telemetry   -- EKG for acute changes   -- Keep Mg > 2, Phos > 3, and K > 4

## 2024-05-08 NOTE — CONSULTS
[facility] - Cardiac Medical ICU  Adult Nutrition  Consult Note    SUMMARY     Recommendations    Diet advancement per SLP - Rec'd 2000 kcal ADA, Renal diet.  If unable to advance diet, place NGT & initiate TFs. Rec'd Novasource @ 40 mL/hr = 1920 kcals, 87 g of protein, 688 mL fluid.  RD to monitor & follow-up.    Goals: Meet % EEN, EPN by RD f/u date  Nutrition Goal Status: new  Communication of RD Recs: discussed on rounds    Assessment and Plan    Moderate malnutrition    Malnutrition Type:  Context: acute illness or injury  Level: moderate    Related to (etiology):   Inability to consume sufficient energy     Signs and Symptoms (as evidenced by):   Weight loss, NFPE    Malnutrition Characteristic Summary:  Weight Loss (Malnutrition): 10% in 6 months  Subcutaneous Fat (Malnutrition): mild depletion  Muscle Mass (Malnutrition): mild depletion    Interventions/Recommendations (treatment strategy):  Collaboration of nutrition care w/ other providers    Nutrition Diagnosis Status:   New    Malnutrition Assessment    Malnutrition Context: acute illness or injury  Malnutrition Level: moderate    Weight Loss (Malnutrition): 10% in 6 months  Subcutaneous Fat (Malnutrition): mild depletion  Muscle Mass (Malnutrition): mild depletion     Reason for Assessment    Reason For Assessment: consult  Diagnosis: other (see comments) (Sepsis)  Relevant Medical History: B/L AKAs, ESRD on HD, DM  Interdisciplinary Rounds: attended    General Information Comments: Extubated this AM - NPO. No family at bedside; unsure of energy intake PTA. Per chart review, pt w/ UBW of 170-180#. NFPE complete; pt meets criteria for moderate malnutrition. Please see PES statement for details.  Nutrition Discharge Planning: Pending clinical course    Nutrition/Diet History    Factors Affecting Nutritional Intake: NPO    Anthropometrics    Temp: 98.1 °F (36.7 °C)  Height Method: Stated  Height: 4' (121.9 cm)  Height (inches): 48 in  Weight Method:  Bed Scale  Weight: 69.8 kg (153 lb 14.1 oz)  Weight (lb): 153.88 lb  Ideal Body Weight (IBW), Female: 40 lb  % Ideal Body Weight, Female (lb): 384.7 %  BMI (Calculated): 47  BMI Grade: greater than 40 - morbid obesity  Usual Body Weight (UBW), k.5 kg  % Usual Body Weight: 90.25  % Weight Change From Usual Weight: -9.94 %  Amputation %: 16, 16  Total Amputation %: 32  Amputation Ideal Body Weight (IBW), Female (lb): 8 lb  Amputee BMI (kg/m2): 69.24 kg/m2    Lab/Procedures/Meds    Pertinent Labs Reviewed: reviewed  Pertinent Labs Comments: Creat 7.6, GFR 5.8, A1C 8  Pertinent Medications Reviewed: reviewed  Pertinent Medications Comments: Heparin    Estimated/Assessed Needs    Weight Used For Calorie Calculations: 69.8 kg (153 lb 14.1 oz)    Energy Calorie Requirements (kcal): 7689-7167 kcal/d  Energy Need Method: Kcal/kg (25-30 kcal/kg)    Protein Requirements: 84 g/d (1.2 g/kg)  Weight Used For Protein Calculations: 69.8 kg (153 lb 14.1 oz)    Estimated Fluid Requirement Method: other (see comments) (Per MD)  RDA Method (mL): 1745    CHO Requirement: 240g    Nutrition Prescription Ordered    Current Diet Order: NPO    Evaluation of Received Nutrient/Fluid Intake    I/O: -6.7L since admit    Comments: LBM: 5/7    Nutrition Risk    Level of Risk/Frequency of Follow-up:  (1x/week)     Monitor and Evaluation    Food and Nutrient Intake: enteral nutrition intake, food and beverage intake, energy intake  Food and Nutrient Adminstration: diet order, enteral and parenteral nutrition administration  Physical Activity and Function: nutrition-related ADLs and IADLs  Anthropometric Measurements: weight, weight change  Biochemical Data, Medical Tests and Procedures: inflammatory profile, lipid profile, glucose/endocrine profile, electrolyte and renal panel, gastrointestinal profile  Nutrition-Focused Physical Findings: overall appearance     Nutrition Follow-Up    RD Follow-up?: Yes

## 2024-05-08 NOTE — ED PROVIDER NOTES
"Encounter Date: 5/7/2024       History     Chief Complaint   Patient presents with    Altered Mental Status     Per spouse pt went "unresponsive" x 1 hour ago. Pt continues to be unresponsive at triage. Able to maintain airway at this time. Glucose 151     58-year-old female with PMH of ESRD on HD presents with unresponsiveness.  Patient was unresponsive so was unable to provide any history.    Per patient's :  He states that at 7:15 a.m. the patient was her normal self, awake and conversant.  Within a few minutes though she was completely unresponsive, so he brought her straight to the ED. additional history limited given the acuity they condition.    The history is provided by the spouse. The history is limited by the condition of the patient.     Review of patient's allergies indicates:   Allergen Reactions    Ciprofloxacin Itching    Contrast media      Kidney injury    Iodine      Kidney injury    Pcn [penicillins]      Rash; tolerated ceftriaxone on 1/13/20     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    Osteomyelitis     Osteomyelitis of left foot 04/30/2021    Ulcer of left foot     Vaginal delivery     x1     Past Surgical History:   Procedure Laterality Date    ABOVE-KNEE AMPUTATION Left 5/18/2021    Procedure: AMPUTATION, ABOVE KNEE;  Surgeon: Teddy Huber MD;  Location: [facility] OR [address] [place] [address];  Service: Vascular;  Laterality: Left;    ABOVE-KNEE AMPUTATION Right 3/18/2022    Procedure: AMPUTATION, ABOVE KNEE;  Surgeon: DAYNE Florez II, MD;  Location: [facility] OR [address] [place] [address];  Service: Vascular;  Laterality: Right;    Angiogram - Right Extremity Right 7/9/15    angiogram-left leg  10/6/15    ANGIOGRAPHY OF LOWER EXTREMITY Left " 4/29/2021    Procedure: ANGIOGRAM, LOWER EXTREMITY;  Surgeon: Teddy Huber MD;  Location: [facility] OR 2ND FLR;  Service: Vascular;  Laterality: Left;    BELOW KNEE AMPUTATION OF LOWER EXTREMITY Right 12/28/2021    Procedure: AMPUTATION, BELOW KNEE;  Surgeon: Kaitlyn Rojas MD;  Location: [facility] OR;  Service: General;  Laterality: Right;    CATHETERIZATION OF BOTH LEFT AND RIGHT HEART N/A 12/18/2019    Procedure: CATHETERIZATION, HEART, BOTH LEFT AND RIGHT;  Surgeon: Que Fernando III, MD;  Location: [facility] CATH LAB;  Service: Cardiology;  Laterality: N/A;    CORONARY ANGIOGRAPHY N/A 12/18/2019    Procedure: ANGIOGRAM, CORONARY ARTERY;  Surgeon: Que Fernando III, MD;  Location: [facility] CATH LAB;  Service: Cardiology;  Laterality: N/A;    CORONARY ANGIOGRAPHY INCLUDING BYPASS GRAFTS WITH CATHETERIZATION OF LEFT HEART N/A 7/28/2020    Procedure: ANGIOGRAM, CORONARY, INCLUDING BYPASS GRAFT, WITH LEFT HEART CATHETERIZATION, 9 am;  Surgeon: Rachel Easley MD;  Location: [facility] CATH LAB;  Service: Cardiology;  Laterality: N/A;    CORONARY ARTERY BYPASS GRAFT (CABG) N/A 1/14/2020    Procedure: CORONARY ARTERY BYPASS GRAFT (CABG) x 1     Off Pump;  Surgeon: Huang Altamirano MD;  [facility];  Service: Cardiovascular;  Laterality: N/A;    CREATION OF FEMORAL-TIBIAL ARTERY BYPASS Left 4/29/2021    Procedure: CREATION, BYPASS, ARTERIAL, FEMORAL TO ANTERIOR TIBIAL;  Surgeon: Teddy Huber MD;  [facility];  Service: Vascular;  Laterality: Left;    CREATION OF FEMOROPOPLITEAL ARTERIAL BYPASS USING GRAFT Left 8/18/2020    Procedure: CREATION, BYPASS, ARTERIAL, FEMORAL TO POPLITEAL, USING GRAFT, LEFT LOWER EXTREMITY;  Surgeon: Teddy Huber MD;  Location: [facility];  Service: Vascular;  Laterality: Left;  REQUEST 7:15 A.M. START----COVID NEGATIVE ON 8/17  1ST CASE STARTE PER LEANA ON 8/7/2020 @ 942AM-LO  RN PREOP 8/12/2020   T/S-----CLEARED BY CARDS-------PENDING INSURANCE     DEBRIDEMENT OF FOOT Left 9/8/2020    Procedure: DEBRIDEMENT, FOOT;  Surgeon: Rosio Mayes DPM;  Location: [facility] OR;  Service: Podiatry;  Laterality: Left;  request neoxx .   RN Pre Op 9-4-2020, Covid negative on 9/5/20. C A    DEBRIDEMENT OF FOOT  3/4/2021    Procedure: DEBRIDEMENT, FOOT;  Surgeon: Teddy Huber MD;  Location: [facility] OR;  Service: Vascular;;    DEBRIDEMENT OF FOOT Left 3/9/2021    Procedure: DEBRIDEMENT, FOOT, bone biopsy;  Surgeon: Rosio Mayes DPM;  Location: [facility] OR;  Service: Podiatry;  Laterality: Left;  Request neoxx---COVID IN AM  REQUESTING NOON START  RN Phone Pre op.On Blood thinners Plavix and Eliquis.  Covid am of surgery. C A    DEBRIDEMENT OF FOOT Left 5/4/2021    Procedure: DEBRIDEMENT, FOOT;  Surgeon: Farooq Morley DPM;  Location: [address];  Service: Podiatry;  Laterality: Left;    INSERTION OF TUNNELED CENTRAL VENOUS HEMODIALYSIS CATHETER N/A 1/27/2020    Procedure: Insertion, Catheter, Central Venous, Hemodialysis;  Surgeon: ESTEBAN Gomez III, MD;  Location: [facility] CATH LAB;  Service: Peripheral Vascular;  Laterality: N/A;    INSERTION OF TUNNELED CENTRAL VENOUS HEMODIALYSIS CATHETER  5/10/2023    Procedure: Insertion, Catheter, Central Venous, Hemodialysis;  Surgeon: Romulo Queen MD;  Location: [facility] CATH LAB;  Service: Cardiology;;    PERCUTANEOUS TRANSLUMINAL ANGIOPLASTY N/A 3/4/2021    Procedure: PTA (ANGIOPLASTY, PERCUTANEOUS, TRANSLUMINAL);  Surgeon: Teddy Huber MD;  Location: [facility] OR;  Service: Vascular;  Laterality: N/A;    REMOVAL OF ARTERIOVENOUS GRAFT Left 5/27/2021    Procedure: REMOVAL, GRAFT, LEFT LOWER EXTREMITY, WOUND EXPLORATION;  Surgeon: Teddy Huber MD;  [address];  Service: Vascular;  Laterality: Left;    REMOVAL OF NAIL OF DIGIT Left 3/9/2021    Procedure: REMOVAL, NAIL, DIGIT;  Surgeon: Rosio Mayes DPM;  Location: [facility] OR;  Service: Podiatry;  Laterality: Left;    RIGHT HEART CATHETERIZATION  Right 5/10/2023    Procedure: INSERTION, CATHETER, RIGHT HEART;  Surgeon: Romulo Queen MD;  Location: [facility] CATH LAB;  Service: Cardiology;  Laterality: Right;    THROMBECTOMY Left 3/4/2021    Procedure: THROMBECTOMY, LEFT LOWER EXTREMITY BYPASS GRAFT, ANGIOGRAM, POSSIBLE INTERVENTION, POSSIBLE LEFT LOWER EXTREMITY BYPASS;  Surgeon: Teddy Huber MD;  Location: [facility] OR;  Service: Vascular;  Laterality: Left;    THROMBECTOMY Left 4/29/2021    Procedure: GRAFT THROMBECTOMY, LEFT LOWER EXTREMITY;  Surgeon: Teddy Huber MD;  Location: [facility] OR 2ND FLR;  Service: Vascular;  Laterality: Left;  14.5 min  1179.85 mGy  341.01 Gycm2  240 ml dye    THROMBECTOMY  10/22/2021    Procedure: THROMBECTOMY;  Surgeon: Saad Arenas MD;  Location: [facility] CATH LAB/EP;  Service: Cardiology;;     Family History   Problem Relation Name Age of Onset    Diabetes Mother      Diabetes Father      Heart disease Maternal Grandmother      No Known Problems Maternal Grandfather      Diabetes Paternal Grandmother      No Known Problems Paternal Grandfather      Anesthesia problems Neg Hx       Social History     Tobacco Use    Smoking status: Former    Smokeless tobacco: Never   Substance Use Topics    Alcohol use: No    Drug use: Yes     Types: Marijuana     Comment: occassional     Review of Systems    Physical Exam     Initial Vitals [05/07/24 2043]   BP Pulse Resp Temp SpO2   (!) 123/58 99 20 98.8 °F (37.1 °C) 100 %      MAP       --         Physical Exam    Nursing note and vitals reviewed.  Constitutional: She is not diaphoretic.   HENT:   Head: Normocephalic and atraumatic.   Eyes: Conjunctivae and EOM are normal. Pupils are equal, round, and reactive to light.   Pupils 4 mm and reactive bilaterally   Neck: Neck supple.   Normal range of motion.  Cardiovascular:  Normal rate, regular rhythm, normal heart sounds and intact distal pulses.           No murmur heard.  Pulmonary/Chest: No respiratory distress.   Breathing is not  depressed but is irregular.  Seems to be slightly choking on secretions.  Bilateral breath sounds that are coarse   Abdominal: Abdomen is soft. She exhibits no distension. There is no abdominal tenderness. There is no rebound and no guarding.   Musculoskeletal:         General: No tenderness or edema.      Cervical back: Normal range of motion and neck supple.     Neurological:   Patient was following no commands, would not open her eyes to painful stimuli or reactive painful stimuli.  She was breathing spontaneously.   Skin: Skin is warm and dry. Capillary refill takes less than 2 seconds.         ED Course   Intubation    Date/Time: 5/7/2024 9:00 PM  Location procedure was performed: [facility] EMERGENCY DEPARTMENT    Performed by: Nitin Mcmahon MD  Authorized by: Nitin Mcmahon MD  Pre-operative diagnosis: respiratory failure  Post-operative diagnosis: respiratory failure  Consent Done: Yes  Consent: Verbal consent obtained.  Risks and benefits: risks, benefits and alternatives were discussed  Consent given by: spouse  Indications: respiratory failure and airway protection  Intubation method: video-assisted  Patient status: paralyzed (RSI)  Preoxygenation: BVM  Sedatives: etomidate  Paralytic: rocuronium  Laryngoscope size: Glide 3  Tube size: 7.5 mm  Tube type: cuffed  Number of attempts: 1  Cricoid pressure: no  Cords visualized: yes  Post-procedure assessment: chest rise and CO2 detector  Breath sounds: equal and absent over the epigastrium  Cuff inflated: yes  Tube secured with: ETT alves  Chest x-ray interpreted by me.  Chest x-ray findings: endotracheal tube in appropriate position  Patient tolerance: Patient tolerated the procedure well with no immediate complications        Labs Reviewed   CBC W/ AUTO DIFFERENTIAL - Abnormal; Notable for the following components:       Result Value    RBC 3.71 (*)     Hemoglobin 11.8 (*)     Hematocrit 35.5 (*)     MCH 31.8 (*)     RDW 16.8 (*)     Lymph # 0.8 (*)      Lymph % 15.0 (*)     All other components within normal limits   COMPREHENSIVE METABOLIC PANEL - Abnormal; Notable for the following components:    Glucose 133 (*)     BUN 59 (*)     Creatinine 7.5 (*)     Total Protein 8.5 (*)     Albumin 2.7 (*)     Alkaline Phosphatase 164 (*)     eGFR 5.9 (*)     All other components within normal limits   URINALYSIS, REFLEX TO URINE CULTURE - Abnormal; Notable for the following components:    Color, UA Orange (*)     Protein, UA 3+ (*)     Occult Blood UA 3+ (*)     Leukocytes, UA 3+ (*)     All other components within normal limits    Narrative:     Specimen Source->Urine   TROPONIN I - Abnormal; Notable for the following components:    Troponin I 0.069 (*)     All other components within normal limits   PROTIME-INR - Abnormal; Notable for the following components:    Prothrombin Time 12.8 (*)     All other components within normal limits    Narrative:     (if patient is on warfarin prior to heparin therapy)   URINALYSIS MICROSCOPIC - Abnormal; Notable for the following components:    RBC, UA 51 (*)     WBC, UA >100 (*)     WBC Clumps, UA Many (*)     Bacteria Many (*)     All other components within normal limits    Narrative:     Specimen Source->Urine   POCT GLUCOSE - Abnormal; Notable for the following components:    POCT Glucose 151 (*)     All other components within normal limits   ISTAT PROCEDURE - Abnormal; Notable for the following components:    POC Glucose 134 (*)     POC BUN 56 (*)     POC Creatinine 8.9 (*)     POC Ionized Calcium 1.04 (*)     All other components within normal limits   ISTAT PROCEDURE - Abnormal; Notable for the following components:    POC PO2 92 (*)     All other components within normal limits   CULTURE, BLOOD   CULTURE, BLOOD   CULTURE, URINE   LACTIC ACID, PLASMA   TSH   DRUG SCREEN PANEL, URINE EMERGENCY    Narrative:     Specimen Source->Urine   MAGNESIUM   ANTI-XA HEPARIN MONITORING   URINALYSIS, REFLEX TO URINE CULTURE   ANTI-XA HEPARIN  MONITORING   ISTAT LACTATE   POCT GLUCOSE MONITORING CONTINUOUS   ISTAT CHEM8   POCT GLUCOSE MONITORING CONTINUOUS     EKG Readings: (Independently Interpreted)   Normal sinus rhythm, rate 97, no STEMI, flipped T waves almost diffusely, which is new       Imaging Results              MRI Brain Without Contrast (In process)                      X-Ray Chest AP Portable (Final result)  Result time 05/07/24 22:22:37      Final result by Rush Deal MD (05/07/24 22:22:37)                   Impression:      New devices as above.    No new infiltrate or effusion with decreased size of the cardiac silhouette.      Electronically signed by: Rush Deal  Date:    05/07/2024  Time:    22:22               Narrative:    EXAMINATION:  XR CHEST AP PORTABLE    CLINICAL HISTORY:  Altered mental status, unspecified    TECHNIQUE:  Single frontal view of the chest was performed.    COMPARISON:  None    FINDINGS:  Patient has been intubated with the ET tube 3 cm above the mireya.  NG tube is been placed with its tip and side port over the fundus of the stomach.  Pre-existing tunneled dialysis catheter on the left is stable.  Defibrillating pacing pads the patient's left side.  Cardiac silhouette is decreased in size.  No new infiltrate effusion or pneumothorax.                                       CTA STROKE MULTI-PHASE (Final result)  Result time 05/07/24 22:16:18      Final result by Chele Kumar MD (05/07/24 22:16:18)                   Impression:      No CT findings of large territorial infarction.  Additional evaluation MRI of the brain may be obtained, as clinically warranted.    Unchanged distention of the ventricular system.    No high-grade stenosis or major vessel occlusion.    Electronically signed by resident: Екатерина Peck  Date:    05/07/2024  Time:    21:45    Electronically signed by: Chele Kumar MD  Date:    05/07/2024  Time:    22:16               Narrative:    EXAMINATION:  CTA STROKE  MULTI-PHASE    CLINICAL HISTORY:  Neuro deficit, acute, stroke suspected    TECHNIQUE:  Non contrast low dose axial images were obtained thought the head. CT angiogram was performed from the level of the mireya to the top of the head following the IV administration of 75mL of Omnipaque 350.   Sagittal and coronal reconstructions and maximum intensity projection reconstructions were performed. Arterial stenosis percentages are based on NASCET measurement criteria.  Two additional phases of immediate post-contrast CTA images were performed through the head alone.    CT source data was analyzed using artificial intelligence software for detection of a large vessel occlusions (LVO) in order to enable computer assisted triage notification and aid clinical stroke decision making.    COMPARISON:  CT head 03/22/2024    MRI brain dated 08/11/2022.    MRA brain dated 08/11/2022.    FINDINGS:  Intracranial Compartment:    Generalized cerebral volume loss.  There is unchanged distention of the ventricular system.  No extra-axial blood or fluid collections.    The brain parenchyma appears unchanged.  There is redemonstration of small nodules adjacent to the lateral ventricles, which may represent gray matter heterotopia.  Hypoattenuation in the supratentorial white matter, nonspecific but most likely reflecting chronic microvascular ischemic changes.  No parenchymal mass, hemorrhage, edema, or major vascular distribution infarct.  Stable calcified extra-axial lesion in the anterior left middle cranial fossa, likely a small calcified meningioma.    Skull/Extracranial Contents (limited evaluation): No fracture. Mastoid air cells and paranasal sinuses are essentially clear.    Non-Vascular Structures of the Neck/Thoracic Inlet (limited evaluation): Dense coronary artery calcifications.  Scattered band like opacities in the visualized portions of the lungs, likely subsegmental atelectasis.  Endotracheal tube in place with tip above  the mireya.  Enteric tube noted.    Aorta: Normal 3 vessel arch.    Extracranial carotid circulation: There is calcified and noncalcified atherosclerosis throughout the extracranial carotid arteries bilaterally.  There is narrowing of the proximal internal carotid artery with approximately 40-50% stenosis by NASCET criteria.  There is mild stenosis at the left carotid bifurcation.  No hemodynamically aneurysmal dilatation or dissection.    Extracranial vertebral circulation: No hemodynamically significant stenosis, aneurysmal dilatation, or dissection.    Intracranial Arteries: Prominent calcification throughout the cavernous portions of the internal carotid arteries bilaterally.  No focal high-grade stenosis, occlusion, or aneurysm.    Venous structures (limited evaluation): Normal.                                       Medications   propofol (DIPRIVAN) 10 mg/mL infusion (15 mcg/kg/min × 71.7 kg Intravenous New Bag 5/7/24 2109)   meropenem (MERREM) 1 g in sodium chloride 0.9 % 100 mL IVPB (MB+) (has no administration in time range)   sodium chloride 0.9% flush 10 mL (has no administration in time range)   heparin 25,000 units in dextrose 5% (100 units/ml) IV bolus from bag LOW INTENSITY nomogram Anti- Xa (has no administration in time range)   heparin 25,000 units in dextrose 5% 250 mL (100 units/mL) infusion LOW INTENSITY nomogram Anti- Xa (has no administration in time range)   heparin 25,000 units in dextrose 5% (100 units/ml) IV bolus from bag LOW INTENSITY nomogram Anti- Xa (has no administration in time range)   heparin 25,000 units in dextrose 5% (100 units/ml) IV bolus from bag LOW INTENSITY nomogram Anti- Xa (has no administration in time range)   atorvastatin tablet 80 mg (has no administration in time range)   carvediloL tablet 6.25 mg (has no administration in time range)   clopidogreL tablet 75 mg (has no administration in time range)   furosemide tablet 40 mg (has no administration in time range)    isosorbide dinitrate tablet 40 mg (has no administration in time range)   multivitamin tablet (has no administration in time range)   sevelamer carbonate tablet 1,600 mg (has no administration in time range)   sodium bicarbonate tablet 650 mg (has no administration in time range)   fentaNYL 50 mcg/mL injection 50 mcg (has no administration in time range)     Followed by   fentaNYL 50 mcg/mL injection 50 mcg (has no administration in time range)   famotidine (PF) injection 20 mg (has no administration in time range)   glucagon (human recombinant) injection 1 mg (has no administration in time range)   insulin aspart U-100 pen 0-10 Units (has no administration in time range)   dextrose 10% bolus 125 mL 125 mL (has no administration in time range)   dextrose 10% bolus 250 mL 250 mL (has no administration in time range)   rocuronium injection 70 mg (70 mg Intravenous Given 5/7/24 2107)   etomidate injection 18 mg (18 mg Intravenous Given 5/7/24 2106)   diphenhydrAMINE injection 50 mg (50 mg Intravenous Given 5/7/24 2118)   iohexoL (OMNIPAQUE 350) injection 75 mL (75 mLs Intravenous Given 5/7/24 2119)     Medical Decision Making  Differential diagnoses considered includes ICH, SAH, CVA, UTI, electrolyte abnormality, PE    Given the patient's hypoxia/respiratory failure and concern for airway protection, she was emergently intubated without complication.  I then called a code stroke given her acute change in mental status.  See ED course for additional information.    Amount and/or Complexity of Data Reviewed  External Data Reviewed: radiology.     Details: === Results for orders placed during the hospital encounter of 05/01/23 ===    Echo    - Interpretation Summary -  · The left ventricle is mildly enlarged with severely decreased systolic function. The estimated ejection fraction is 15%.  · There is severe left ventricular global hypokinesis.  · Normal right ventricular size with low normal right ventricular systolic  function.  · Grade I left ventricular diastolic dysfunction.  · Biatrial enlargement.  · Mild-to-moderate mitral regurgitation.  · Severe tricuspid regurgitation.  · The estimated PA systolic pressure is 43 mmHg.  · Elevated central venous pressure (15 mmHg).      Labs: ordered. Decision-making details documented in ED Course.  Radiology: ordered. Decision-making details documented in ED Course.  ECG/medicine tests: ordered and independent interpretation performed. Decision-making details documented in ED Course.    Risk  Drug therapy requiring intensive monitoring for toxicity.  Decision regarding hospitalization.  Diagnosis or treatment significantly limited by social determinants of health.               ED Course as of 05/08/24 0043   Tue May 07, 2024   2148 CBC auto differential(!)  CBC unremarkable without leukocytosis, significant anemia, or decreased platelets   [BD]   2148 Comprehensive metabolic panel(!)  Baseline ESRD without acute electrolyte derangement [BD]   2148 Troponin I(!): 0.069  Chronically elevated troponin, this is slightly higher than baseline [BD]   2149 Patient was emergently intubated in code stroke called shortly after around 9:15 p.m. given the patient was at her baseline around 715 and then acutely became altered and nonverbal [BD]   2219 CTA STROKE MULTI-PHASE  No acute abnormality [BD]   2222 Discussed the case with ICU who will come evaluate the patient    Discussed the case with ICU who will admit the patient to their service for acute encephalopathy and respiratory failure requiring intubation, currently unknown etiology.    Procedure: Critical Care  Please put in 30 minutes of critical care due to patient having a high risk of respiratory failure and neurological failure. Gave O2 and manually bagged the patient. This time was separate from the procedure time.      [BD]      ED Course User Index  [BD] Nitin Mcmahon MD                           Clinical Impression:  Final  diagnoses:  [R41.89] Unresponsive  [R41.82] AMS (altered mental status)  [R09.02] Hypoxia (Primary)          ED Disposition Condition    Admit                 Nitin Mcmahon MD  05/08/24 0044

## 2024-05-08 NOTE — CONSULTS
[facility] - Cardiac Medical ICU  Infectious Disease  Consult Note    Patient Name: Suyapa Connelly  MRN: [id number]  Admission Date: 5/7/2024  Hospital Length of Stay: 1 days  Attending Physician: Onofre Spann MD  Primary Care Provider: Magen Christensen MD     Isolation Status: No active isolations    Patient information was obtained from past medical records and ER records.      Inpatient consult to Infectious Diseases  Consult performed by: Seema Dye MD  Consult ordered by: Kevyn Toney MD        Assessment/Plan:     Neuro  Encephalopathy  58 F h/o severe PVD s/p b/l LE AKA, ESRD on HD, IDDM2, CAD s/p CABG, Afib (on eliquis) who presented after being found unresponsive at home. She has 3 peripheral IV, LIJ HD catheter placed 1 year ago.   MRI brain without acute abnormality, simialry with CTA stroke.  Prior urine cx from 12/02 have grown proteus mirabilis (pan sensitive) and urine cx from 3/11 grew ESBL Klebsiella pneumoniae. AF, HR 70-80, -130/50-60s, on 30% FiO2/5.  Labs without leukocytosis WBC of 5.25 on admission, and 6 today. Lactic acid 1.3. UDS negative. UA 3+ leukocytes, >100 WBC., many bacteria and WBC clumping    Infectious Disease consulted for prior h/o ESBL (recurrent UTI) admitted for AMS and urosepsis, started on meropenem. Prior urine cx from 12/02/23  have grown proteus mirabilis (pan sensitive) amd urine cx from 3/11/22 grew ESBL Klebsiella pneumoniae. Worsening encephalopathy could be 2/2 UTI with concomitant failure to thrive as noted during prior hospitalizations. Low suspicion for CNS infection at this time.     Recommendations:   - fu blood cultures from 5/7  - fu urine cultures   - recommend discontinuing vancomycin.   - recommend continuing meropenem until culture results return, then will de-escalate as appropriate     Discussed with primary, will continue to follow up with patient.         Thank you for your consult. I will follow-up with patient. Please contact  us if you have any additional questions.    Seema Dye MD  Infectious Disease  [facility] - Cardiac Medical ICU    Subjective:     Principal Problem: Severe sepsis    HPI: Ms. Suyapa Connelly is a 59 yo female with a h/o severe PVD s/p b/l LE AKA, ESRD on HD, IDDM2, CAD s/p CABG, Afib (on eliquis) who presented after being found unresponsive at home. She has had recurrent hospitalization this past year d/t encephlopathy. Most recently on 3/22-3/23 due to progressive congitive decline. Admitted to MICU d/t requiring intubation for airway protection. Infectious Disease  consulted for prior h/o ESBL (recurrent UTI) admitted for AMS and urosepsis, started on meropenem.    She has been AF, HR 70-80, -130/50-60s, initially intubated on 30% FiO2/ PEEP 5, now extubated on RA. Labs without leukocytosis WBC of 5.25 on admission, and 6 today. Lactic acid 1.3. UDS negative. UA 3+ leukocytes, >100 WBC., many bacteria and WBC clumping. MRI brain without acute abnormality, simialry with CTA stroke. CXR with no acute cardiopulmonary process. Blood cultures from 5/7 thus far with NGTD, urine cx in process. Started on meropenem. She has 3 peripheral IV, LIJ HD catheter placed 1 year ago.     Upon evaluation, patient was AAOX1, to self only. Prior urine cx from 12/02  have grown proteus mirabilis (pan sensitive) amd urine cx from 3/11 grew ESBL Klebsiella pneumoniae. Notably, has a Penicillin allergy causing a rash; tolerated ceftriaxone on 1/13/20 and has itching with ciprofloxacin     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    Osteomyelitis     Osteomyelitis of left foot 04/30/2021    Ulcer of left foot     Vaginal delivery     x1       Past Surgical  History:   Procedure Laterality Date    ABOVE-KNEE AMPUTATION Left 5/18/2021    Procedure: AMPUTATION, ABOVE KNEE;  Surgeon: Teddy Huber MD;  Location: [address];  Service: Vascular;  Laterality: Left;    ABOVE-KNEE AMPUTATION Right 3/18/2022    Procedure: AMPUTATION, ABOVE KNEE;  Surgeon: DAYNE Florez II, MD;  Location: [facility] OR [address] [place] [address];  Service: Vascular;  Laterality: Right;    Angiogram - Right Extremity Right 7/9/15    angiogram-left leg  10/6/15    ANGIOGRAPHY OF LOWER EXTREMITY Left 4/29/2021    Procedure: ANGIOGRAM, LOWER EXTREMITY;  Surgeon: Teddy Huber MD;  Location: [facility] OR [address] [place] [address];  Service: Vascular;  Laterality: Left;    BELOW KNEE AMPUTATION OF LOWER EXTREMITY Right 12/28/2021    Procedure: AMPUTATION, BELOW KNEE;  Surgeon: Kaitlyn Rojas MD;  Location: [facility] [place];  Service: General;  Laterality: Right;    CATHETERIZATION OF BOTH LEFT AND RIGHT HEART N/A 12/18/2019    Procedure: CATHETERIZATION, HEART, BOTH LEFT AND RIGHT;  Surgeon: Que Fernando III, MD;  Location: [facility] CATH LAB;  Service: Cardiology;  Laterality: N/A;    CORONARY ANGIOGRAPHY N/A 12/18/2019    Procedure: ANGIOGRAM, CORONARY ARTERY;  Surgeon: Que Fernando III, MD;  Location: [facility] CATH LAB;  Service: Cardiology;  Laterality: N/A;    CORONARY ANGIOGRAPHY INCLUDING BYPASS GRAFTS WITH CATHETERIZATION OF LEFT HEART N/A 7/28/2020    Procedure: ANGIOGRAM, CORONARY, INCLUDING BYPASS GRAFT, WITH LEFT HEART CATHETERIZATION, 9 am;  Surgeon: Rachel Easley MD;  Location: [facility] CATH LAB;  Service: Cardiology;  Laterality: N/A;    CORONARY ARTERY BYPASS GRAFT (CABG) N/A 1/14/2020    Procedure: CORONARY ARTERY BYPASS GRAFT (CABG) x 1     Off Pump;  Surgeon: Huang Altamirano MD;  Location: [address];  Service: Cardiovascular;  Laterality: N/A;    CREATION OF FEMORAL-TIBIAL ARTERY BYPASS Left 4/29/2021    Procedure: CREATION, BYPASS, ARTERIAL, FEMORAL TO ANTERIOR TIBIAL;  Surgeon: Teddy Huber,  MD;  [facility];  Service: Vascular;  Laterality: Left;    CREATION OF FEMOROPOPLITEAL ARTERIAL BYPASS USING GRAFT Left 8/18/2020    Procedure: CREATION, BYPASS, ARTERIAL, FEMORAL TO POPLITEAL, USING GRAFT, LEFT LOWER EXTREMITY;  Surgeon: Teddy Huber MD;  Location: [facility] OR;  Service: Vascular;  Laterality: Left;  REQUEST 7:15 A.M. START----COVID NEGATIVE ON 8/17  1ST CASE STARTE PER LEANA ON 8/7/2020 @ 942AM-LO  RN PREOP 8/12/2020   T/S-----CLEARED BY CARDS-------PENDING INSURANCE    DEBRIDEMENT OF FOOT Left 9/8/2020    Procedure: DEBRIDEMENT, FOOT;  Surgeon: Rosio Mayes DPM;  Location: [facility] OR;  Service: Podiatry;  Laterality: Left;  request neoxx .   RN Pre Op 9-4-2020, Covid negative on 9/5/20. C A    DEBRIDEMENT OF FOOT  3/4/2021    Procedure: DEBRIDEMENT, FOOT;  Surgeon: Teddy Huber MD;  Location: [facility] OR;  Service: Vascular;;    DEBRIDEMENT OF FOOT Left 3/9/2021    Procedure: DEBRIDEMENT, FOOT, bone biopsy;  Surgeon: Rosio Mayes DPM;  Location: [facility] OR;  Service: Podiatry;  Laterality: Left;  Request neoxx---COVID IN AM  REQUESTING NOON START  RN Phone Pre op.On Blood thinners Plavix and Eliquis.  Covid am of surgery. C A    DEBRIDEMENT OF FOOT Left 5/4/2021    Procedure: DEBRIDEMENT, FOOT;  Surgeon: Farooq Morley DPM;  [address];  Service: Podiatry;  Laterality: Left;    INSERTION OF TUNNELED CENTRAL VENOUS HEMODIALYSIS CATHETER N/A 1/27/2020    Procedure: Insertion, Catheter, Central Venous, Hemodialysis;  Surgeon: ESTEBAN Gomez III, MD;  Location: [facility] CATH LAB;  Service: Peripheral Vascular;  Laterality: N/A;    INSERTION OF TUNNELED CENTRAL VENOUS HEMODIALYSIS CATHETER  5/10/2023    Procedure: Insertion, Catheter, Central Venous, Hemodialysis;  Surgeon: Romulo Queen MD;  Location: [facility] CATH LAB;  Service: Cardiology;;    PERCUTANEOUS TRANSLUMINAL ANGIOPLASTY N/A 3/4/2021    Procedure: PTA (ANGIOPLASTY, PERCUTANEOUS, TRANSLUMINAL);   Surgeon: Teddy Huber MD;  Location: [facility] OR;  Service: Vascular;  Laterality: N/A;    REMOVAL OF ARTERIOVENOUS GRAFT Left 5/27/2021    Procedure: REMOVAL, GRAFT, LEFT LOWER EXTREMITY, WOUND EXPLORATION;  Surgeon: Teddy Huber MD;  Location: [facility] OR 2ND FLR;  Service: Vascular;  Laterality: Left;    REMOVAL OF NAIL OF DIGIT Left 3/9/2021    Procedure: REMOVAL, NAIL, DIGIT;  Surgeon: Rosio Mayes DPM;  Location: [facility] OR;  Service: Podiatry;  Laterality: Left;    RIGHT HEART CATHETERIZATION Right 5/10/2023    Procedure: INSERTION, CATHETER, RIGHT HEART;  Surgeon: Romulo Queen MD;  Location: [facility] CATH LAB;  Service: Cardiology;  Laterality: Right;    THROMBECTOMY Left 3/4/2021    Procedure: THROMBECTOMY, LEFT LOWER EXTREMITY BYPASS GRAFT, ANGIOGRAM, POSSIBLE INTERVENTION, POSSIBLE LEFT LOWER EXTREMITY BYPASS;  Surgeon: Teddy Huber MD;  Location: [facility] OR;  Service: Vascular;  Laterality: Left;    THROMBECTOMY Left 4/29/2021    Procedure: GRAFT THROMBECTOMY, LEFT LOWER EXTREMITY;  Surgeon: Teddy Huber MD;  Location: [facility] OR 2ND FLR;  Service: Vascular;  Laterality: Left;  14.5 min  1179.85 mGy  341.01 Gycm2  240 ml dye    THROMBECTOMY  10/22/2021    Procedure: THROMBECTOMY;  Surgeon: Saad Arenas MD;  Location: [facility] CATH LAB/EP;  Service: Cardiology;;       Review of patient's allergies indicates:   Allergen Reactions    Ciprofloxacin Itching    Contrast media      Kidney injury    Iodine      Kidney injury    Pcn [penicillins]      Rash; tolerated ceftriaxone on 1/13/20       Medications:  Medications Prior to Admission   Medication Sig    acetaminophen (TYLENOL) 500 MG tablet Take 500 mg by mouth every 6 (six) hours as needed for Pain.    allopurinoL (ZYLOPRIM) 100 MG tablet Take 0.5 tablets (50 mg total) by mouth every other day.    apixaban (ELIQUIS) 5 mg Tab Take 1 tablet (5 mg total) by mouth 2 (two) times daily.    atorvastatin (LIPITOR) 80 MG tablet Take 1 tablet (80  "mg total) by mouth once daily.    blood sugar diagnostic Strp To check BG 3 times daily, to use with insurance preferred meter    blood sugar diagnostic Strp Use to check blood glucose 2 (two) times a day.    blood-glucose meter Misc To check BG 3 times daily, to use with insurance preferred meter    blood-glucose meter Misc Use to check blood glucose 2 (two) times a day.    calcitRIOL (ROCALTROL) 0.5 MCG Cap Take 1 capsule (0.5 mcg total) by mouth once daily.    calcium acetate,phosphat bind, (PHOSLO) 667 mg capsule Take 1 capsule (667 mg total) by mouth 3 (three) times daily with meals.    carvediloL (COREG) 6.25 MG tablet Take 1 tablet (6.25 mg total) by mouth 2 (two) times daily.    clopidogreL (PLAVIX) 75 mg tablet Take 1 tablet (75 mg total) by mouth once daily.    famotidine (PEPCID) 20 MG tablet Take 1 tablet (20 mg total) by mouth 2 (two) times daily.    furosemide (LASIX) 40 MG tablet Take 1 tablet (40 mg total) by mouth 2 (two) times a day.    hydrALAZINE (APRESOLINE) 50 MG tablet Take 1 tablet (50 mg total) by mouth 3 (three) times daily.    insulin (LANTUS SOLOSTAR U-100 INSULIN) glargine 100 units/mL SubQ pen Inject 8 Units into the skin every evening.    insulin aspart, niacinamide, (FIASP FLEXTOUCH U-100 INSULIN) 100 unit/mL (3 mL) InPn Inject 1-5 Units into the skin before meals as needed (Per sliding scale). Blood sugar 150 to 200 add 1 units.  Blood sugar 201 to 250 add 2 units.  Blood sugar 251 to 300 add 3 units  Blood sugar 301 to 350 add 4 units.  Blood sugar greater than 350 add 5 units.    isosorbide dinitrate (ISORDIL) 20 MG tablet Take 2 tablets (40 mg total) by mouth 3 (three) times daily.    lancets 30 gauge Misc Use to check blood glucose 2 (two) times a day.    lancets Misc To check BG 3 times daily, to use with insurance preferred meter    multivitamin (ONE DAILY MULTIVITAMIN) per tablet Take 1 tablet by mouth once daily.    pen needle, diabetic 32 gauge x 5/32" Ndle 1 Units by " "Misc.(Non-Drug; Combo Route) route before meals as needed (SSI).    pregabalin (LYRICA) 100 MG capsule Take 100 mg by mouth 2 (two) times daily.    sertraline (ZOLOFT) 100 MG tablet Take 1 tablet (100 mg total) by mouth once daily.    sevelamer carbonate (RENVELA) 800 mg Tab Take 2 tablets (1,600 mg total) by mouth 3 (three) times daily.    sodium bicarbonate 650 MG tablet TAKE 1 TABLET(650 MG) BY MOUTH THREE TIMES DAILY    traZODone (DESYREL) 50 MG tablet Take 0.5 tablets (25 mg total) by mouth every evening.     Antibiotics (From admission, onward)      Start     Stop Route Frequency Ordered    05/09/24 0100  meropenem (MERREM) 500 mg in sodium chloride 0.9 % 100 mL IVPB (MB+)         -- IV Every 12 hours (non-standard times) 05/08/24 1135    05/08/24 1100  vancomycin 1,500 mg in dextrose 5 % (D5W) 250 mL IVPB (Vial-Mate)         -- IV Once 05/08/24 0948    05/08/24 1042  vancomycin - pharmacy to dose  (vancomycin IVPB (PEDS and ADULTS))        Placed in "And" Linked Group    -- IV pharmacy to manage frequency 05/08/24 0942          Antifungals (From admission, onward)      None          Antivirals (From admission, onward)      None             Immunization History   Administered Date(s) Administered    COVID-19, MRNA, LN-S, PF (Pfizer) (Purple Cap) 03/09/2021, 03/30/2021    Hepatitis B, Adult 06/02/2023, 07/04/2023, 08/04/2023       Family History       Problem Relation (Age of Onset)    Diabetes Mother, Father, Paternal Grandmother    Heart disease Maternal Grandmother    No Known Problems Maternal Grandfather, Paternal Grandfather          Social History     Socioeconomic History    Marital status:    Occupational History    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      Social Determinants of Health     Financial Resource Strain: Low Risk  (2/6/2024)    Overall Financial Resource Strain (CARDIA)     Difficulty of Paying Living Expenses: Not very hard   Food Insecurity: No Food Insecurity (2/6/2024)    Hunger Vital Sign     Worried About Running Out of Food in the Last Year: Never true     Ran Out of Food in the Last Year: Never true   Transportation Needs: No Transportation Needs (2/6/2024)    PRAPARE - Transportation     Lack of Transportation (Medical): No     Lack of Transportation (Non-Medical): No   Recent Concern: Transportation Needs - Unmet Transportation Needs (12/22/2023)    Received from [facility] and [facility]iliates    PRAPARE - Transportation     Lack of Transportation (Medical): Yes     Lack of Transportation (Non-Medical): Yes   Physical Activity: Inactive (2/6/2024)    Exercise Vital Sign     Days of Exercise per Week: 0 days     Minutes of Exercise per Session: 0 min   Stress: No Stress Concern Present (2/6/2024)    [employer] [place] [employer] - Occupational Stress Questionnaire     Feeling of Stress : Only a little   Recent Concern: Stress - Stress Concern Present (12/2/2023)    [employer] [place] [employer] - Occupational Stress Questionnaire     Feeling of Stress : Rather much   Housing Stability: Low Risk  (2/6/2024)    Housing Stability Vital Sign     Unable to Pay for Housing in the Last Year: No     Number of Places Lived in the Last Year: 1     Unstable Housing in the Last Year: No     Review of Systems   Unable to perform ROS: Mental status change     Objective:     Vital Signs (Most Recent):  Temp: 98.1 °F (36.7 °C) (05/08/24 1000)  Pulse: 84 (05/08/24 1000)  Resp: 17 (05/08/24 1000)  BP: 121/69 (05/08/24 1000)  SpO2: 100 % (05/08/24 1000) Vital Signs (24h Range):  Temp:  [96.8 °F (36 °C)-99.1 °F (37.3 °C)] 98.1 °F (36.7 °C)  Pulse:  [69-99]  84  Resp:  [] 17  SpO2:  [85 %-100 %] 100 %  BP: (107-139)/(55-83) 121/69     Weight: 69.8 kg (153 lb 14.1 oz)  Body mass index is 46.96 kg/m².    Estimated Creatinine Clearance: 6.1 mL/min (A) (based on SCr of 7.6 mg/dL (H)).     Physical Exam  Constitutional:       Appearance: She is ill-appearing.   HENT:      Head: Normocephalic and atraumatic.      Mouth/Throat:      Mouth: Mucous membranes are moist.   Eyes:      Extraocular Movements: Extraocular movements intact.      Conjunctiva/sclera: Conjunctivae normal.   Cardiovascular:      Rate and Rhythm: Normal rate and regular rhythm.      Heart sounds: Normal heart sounds.      Comments: Has left sided tunnel catheter placed   Pulmonary:      Effort: Pulmonary effort is normal. No respiratory distress.      Breath sounds: Normal breath sounds.   Abdominal:      General: Abdomen is flat. Bowel sounds are normal. There is no distension.      Palpations: Abdomen is soft.      Tenderness: There is no abdominal tenderness. There is no guarding.   Genitourinary:     Comments: Kerns placed  Musculoskeletal:      Comments: B/l AKA amputation, no increased erythema noted    Skin:     General: Skin is warm.   Neurological:      Mental Status: She is lethargic.      Comments: Oriented to self and able to follow some commands           Significant Labs: All pertinent labs within the past 24 hours have been reviewed.    Significant Imaging: I have reviewed all pertinent imaging results/findings within the past 24 hours.

## 2024-05-08 NOTE — ASSESSMENT & PLAN NOTE
-- Extensive PVD ultimately requiring bilateral AKA  -- Continue home lipitor and plavix while admitted

## 2024-05-08 NOTE — HPI
Ms. Suyapa Connelly is a 59 yo female with a h/o severe PVD s/p b/l LE AKA, ESRD on HD, IDDM2, CAD s/p CABG, Afib (on eliquis) who presented after being found unresponsive at home. She has had recurrent hospitalization this past year d/t encephlopathy. Most recently on 3/22-3/23 due to progressive congitive decline. Admitted to MICU d/t requiring intubation for airway protection. Infectious Disease  consulted for prior h/o ESBL (recurrent UTI) admitted for AMS and urosepsis, started on meropenem.    She has been AF, HR 70-80, -130/50-60s, initially intubated on 30% FiO2/ PEEP 5, now extubated on RA. Labs without leukocytosis WBC of 5.25 on admission, and 6 today. Lactic acid 1.3. UDS negative. UA 3+ leukocytes, >100 WBC., many bacteria and WBC clumping. MRI brain without acute abnormality, simialry with CTA stroke. CXR with no acute cardiopulmonary process. Blood cultures from 5/7 thus far with NGTD, urine cx in process. Started on meropenem. She has 3 peripheral IV, LIJ HD catheter placed 1 year ago.     Upon evaluation, patient was AAOX1, to self only. Prior urine cx from 12/02  have grown proteus mirabilis (pan sensitive) amd urine cx from 3/11 grew ESBL Klebsiella pneumoniae. Notably, has a Penicillin allergy causing a rash; tolerated ceftriaxone on 1/13/20 and has itching with ciprofloxacin

## 2024-05-08 NOTE — PLAN OF CARE
Recommendations     Diet advancement per SLP - Rec'd 2000 kcal ADA, Renal diet.  If unable to advance diet, place NGT & initiate TFs. Rec'd Novasource @ 40 mL/hr = 1920 kcals, 87 g of protein, 688 mL fluid.  RD to monitor & follow-up.     Goals: Meet % EEN, EPN by RD f/u date  Nutrition Goal Status: new  Communication of RD Recs: discussed on rounds

## 2024-05-08 NOTE — SUBJECTIVE & OBJECTIVE
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    Osteomyelitis     Osteomyelitis of left foot 04/30/2021    Ulcer of left foot     Vaginal delivery     x1     Past Surgical History:   Procedure Laterality Date    ABOVE-KNEE AMPUTATION Left 5/18/2021    Procedure: AMPUTATION, ABOVE KNEE;  Surgeon: Teddy Huber MD;  Location: [facility] OR [address] [place] [address];  Service: Vascular;  Laterality: Left;    ABOVE-KNEE AMPUTATION Right 3/18/2022    Procedure: AMPUTATION, ABOVE KNEE;  Surgeon: DAYNE Florez II, MD;  Location: [facility] OR [address] [place] [address];  Service: Vascular;  Laterality: Right;    Angiogram - Right Extremity Right 7/9/15    angiogram-left leg  10/6/15    ANGIOGRAPHY OF LOWER EXTREMITY Left 4/29/2021    Procedure: ANGIOGRAM, LOWER EXTREMITY;  Surgeon: Teddy Huber MD;  Location: [facility] OR [address] [place] [address];  Service: Vascular;  Laterality: Left;    BELOW KNEE AMPUTATION OF LOWER EXTREMITY Right 12/28/2021    Procedure: AMPUTATION, BELOW KNEE;  Surgeon: Kaitlyn Rojas MD;  Location: [facility] OR;  Service: General;  Laterality: Right;    CATHETERIZATION OF BOTH LEFT AND RIGHT HEART N/A 12/18/2019    Procedure: CATHETERIZATION, HEART, BOTH LEFT AND RIGHT;  Surgeon: Que Fernando III, MD;  Location: [facility] CATH LAB;  Service: Cardiology;  Laterality: N/A;    CORONARY ANGIOGRAPHY N/A 12/18/2019    Procedure: ANGIOGRAM, CORONARY ARTERY;  Surgeon: Que Fernando III, MD;  Location: [facility] CATH LAB;  Service: Cardiology;  Laterality: N/A;    CORONARY ANGIOGRAPHY INCLUDING BYPASS GRAFTS WITH CATHETERIZATION OF LEFT HEART N/A 7/28/2020    Procedure: ANGIOGRAM, CORONARY, INCLUDING BYPASS GRAFT, WITH LEFT HEART CATHETERIZATION, 9 am;  Surgeon: Rachel Easley MD;   Location: [facility] CATH LAB;  Service: Cardiology;  Laterality: N/A;    CORONARY ARTERY BYPASS GRAFT (CABG) N/A 1/14/2020    Procedure: CORONARY ARTERY BYPASS GRAFT (CABG) x 1     Off Pump;  Surgeon: Huang Altamirano MD;  Location: [facility] OR [address] [place] [address];  Service: Cardiovascular;  Laterality: N/A;    CREATION OF FEMORAL-TIBIAL ARTERY BYPASS Left 4/29/2021    Procedure: CREATION, BYPASS, ARTERIAL, FEMORAL TO ANTERIOR TIBIAL;  Surgeon: Teddy Huber MD;  Location: [facility] OR [address] [place] [address];  Service: Vascular;  Laterality: Left;    CREATION OF FEMOROPOPLITEAL ARTERIAL BYPASS USING GRAFT Left 8/18/2020    Procedure: CREATION, BYPASS, ARTERIAL, FEMORAL TO POPLITEAL, USING GRAFT, LEFT LOWER EXTREMITY;  Surgeon: Teddy Huber MD;  Location: [facility];  Service: Vascular;  Laterality: Left;  REQUEST 7:15 A.M. START----COVID NEGATIVE ON 8/17 1ST CASE STARTE PER LEANA ON 8/7/2020 @ 942AM-  RN PREOP 8/12/2020   T/S-----CLEARED BY CARDS-------PENDING INSURANCE    DEBRIDEMENT OF FOOT Left 9/8/2020    Procedure: DEBRIDEMENT, FOOT;  Surgeon: Rosio Mayes DPM;  Location: [facility];  Service: Podiatry;  Laterality: Left;  request neoxx .   RN Pre Op 9-4-2020, Covid negative on 9/5/20. C A    DEBRIDEMENT OF FOOT  3/4/2021    Procedure: DEBRIDEMENT, FOOT;  Surgeon: Teddy Huber MD;  Location: [facility] OR;  Service: Vascular;;    DEBRIDEMENT OF FOOT Left 3/9/2021    Procedure: DEBRIDEMENT, FOOT, bone biopsy;  Surgeon: Rosio Mayes DPM;  Location: [facility] OR;  Service: Podiatry;  Laterality: Left;  Request neoxx---COVID IN AM  REQUESTING NOON START  RN Phone Pre op.On Blood thinners Plavix and Eliquis.  Covid am of surgery. C A    DEBRIDEMENT OF FOOT Left 5/4/2021    Procedure: DEBRIDEMENT, FOOT;  Surgeon: Farooq Morley DPM;  Location: [address];  Service: Podiatry;  Laterality: Left;    INSERTION OF TUNNELED CENTRAL VENOUS HEMODIALYSIS CATHETER N/A 1/27/2020    Procedure: Insertion, Catheter, Central Venous,  Hemodialysis;  Surgeon: ESTEBAN Gomez III, MD;  Location: [facility] CATH LAB;  Service: Peripheral Vascular;  Laterality: N/A;    INSERTION OF TUNNELED CENTRAL VENOUS HEMODIALYSIS CATHETER  5/10/2023    Procedure: Insertion, Catheter, Central Venous, Hemodialysis;  Surgeon: Romulo Queen MD;  Location: [facility] CATH LAB;  Service: Cardiology;;    PERCUTANEOUS TRANSLUMINAL ANGIOPLASTY N/A 3/4/2021    Procedure: PTA (ANGIOPLASTY, PERCUTANEOUS, TRANSLUMINAL);  Surgeon: Teddy Huber MD;  Location: [facility] OR;  Service: Vascular;  Laterality: N/A;    REMOVAL OF ARTERIOVENOUS GRAFT Left 5/27/2021    Procedure: REMOVAL, GRAFT, LEFT LOWER EXTREMITY, WOUND EXPLORATION;  Surgeon: Teddy Huber MD;  Location: [facility] OR 2ND FLR;  Service: Vascular;  Laterality: Left;    REMOVAL OF NAIL OF DIGIT Left 3/9/2021    Procedure: REMOVAL, NAIL, DIGIT;  Surgeon: Rosio Mayes DPM;  Location: [facility] OR;  Service: Podiatry;  Laterality: Left;    RIGHT HEART CATHETERIZATION Right 5/10/2023    Procedure: INSERTION, CATHETER, RIGHT HEART;  Surgeon: Romulo Queen MD;  Location: [facility] CATH LAB;  Service: Cardiology;  Laterality: Right;    THROMBECTOMY Left 3/4/2021    Procedure: THROMBECTOMY, LEFT LOWER EXTREMITY BYPASS GRAFT, ANGIOGRAM, POSSIBLE INTERVENTION, POSSIBLE LEFT LOWER EXTREMITY BYPASS;  Surgeon: Teddy Huber MD;  Location: [facility] OR;  Service: Vascular;  Laterality: Left;    THROMBECTOMY Left 4/29/2021    Procedure: GRAFT THROMBECTOMY, LEFT LOWER EXTREMITY;  Surgeon: Teddy Huber MD;  Location: [facility] OR 2ND FLR;  Service: Vascular;  Laterality: Left;  14.5 min  1179.85 mGy  341.01 Gycm2  240 ml dye    THROMBECTOMY  10/22/2021    Procedure: THROMBECTOMY;  Surgeon: Saad Arenas MD;  Location: [facility] CATH LAB/EP;  Service: Cardiology;;     Social History     Tobacco Use    Smoking status: Former    Smokeless tobacco: Never   Substance Use Topics    Alcohol use: No    Drug use: Yes     Types: Marijuana      Comment: occassional     Review of patient's allergies indicates:   Allergen Reactions    Ciprofloxacin Itching    Contrast media      Kidney injury    Iodine      Kidney injury    Pcn [penicillins]      Rash; tolerated ceftriaxone on 1/13/20       Medications: I have reviewed the current medication administration record.    (Not in a hospital admission)      Review of Systems   Unable to perform ROS: Intubated   Psychiatric/Behavioral:  Positive for confusion.      Objective:     Vital Signs (Most Recent):  Temp: 99.1 °F (37.3 °C) (05/07/24 2157)  Pulse: 88 (05/07/24 2157)  Resp: (!) 27 (05/07/24 2157)  BP: 117/83 (05/07/24 2157)  SpO2: 98 % (05/07/24 2157)    Vital Signs Range (Last 24H):  Temp:  [98.8 °F (37.1 °C)-99.1 °F (37.3 °C)]   Pulse:  [88-99]   Resp:  [20-27]   BP: (107-137)/(56-83)   SpO2:  [96 %-100 %]        Physical Exam  Constitutional:       Appearance: She is ill-appearing.      Interventions: She is intubated.   Pulmonary:      Effort: She is intubated.   Musculoskeletal:      Comments: Bilateral BKA              Neurological Exam:   LOC: intubated and sedated and paralyzed  Attention Span: untestable  Language: Intubated  Articulation: Untestable due to intubation  Orientation: Untestable due to intubation  Motor: bilateral BKA, untestable given paralytic use for intubation  Sensation: untestable given intubated/sedated/paralyzed  Tone: Flaccid  LUE  and RUE      Laboratory:  CMP:   Recent Labs   Lab 05/07/24 2104   CALCIUM 9.0   ALBUMIN 2.7*   PROT 8.5*      K 5.1   CO2 23      BUN 59*   CREATININE 7.5*   ALKPHOS 164*   ALT 10   AST 20   BILITOT 0.8     CBC:   Recent Labs   Lab 05/07/24 2104   WBC 5.25   RBC 3.71*   HGB 11.8*   HCT 35.5*      MCV 96   MCH 31.8*   MCHC 33.2       Diagnostic Results:      Brain/Vessel imaging:  CTA MP:     No CT findings of large territorial infarction.  Additional evaluation MRI of the brain may be obtained, as clinically warranted.      Unchanged distention of the ventricular system.     No high-grade stenosis or major vessel occlusion.    Cardiac Evaluation:   Results for orders placed during the hospital encounter of 05/01/23    Echo    Interpretation Summary  · The left ventricle is mildly enlarged with severely decreased systolic function. The estimated ejection fraction is 15%.  · There is severe left ventricular global hypokinesis.  · Normal right ventricular size with low normal right ventricular systolic function.  · Grade I left ventricular diastolic dysfunction.  · Biatrial enlargement.  · Mild-to-moderate mitral regurgitation.  · Severe tricuspid regurgitation.  · The estimated PA systolic pressure is 43 mmHg.  · Elevated central venous pressure (15 mmHg).

## 2024-05-08 NOTE — ASSESSMENT & PLAN NOTE
58 year old female w/ extensive medical history as below who presents with acute onset of altered mental status/decreased responsiveness onset around 1900. Has a history of episodes similar to this in the past. Patient was intubated and a stroke code was called. She reportedly did not seem to have any focal deficits prior to intubation. CT/CTA did not show large infarction or hemorrhage and no LVO was seen.     Patient re-examined at 2250 - remains intubated, now moving extremities, localizing to pain, no focal neurological deficits seen. GCS 9 [id number]    Recommendations:  -Low suspicion for acute neurovascular event causing patients presentation given normal CTA and the patient's history of similar presentations  -Can obtain MRI to further evaluate  -Please contact stroke team with any questions

## 2024-05-08 NOTE — CONSULTS
[facility] - Cardiac Medical ICU  Nephrology  Consult Note    Patient Name: Suyapa Connelly  MRN: [id number]  Admission Date: 5/7/2024  Hospital Length of Stay: 1 days  Attending Provider: Onofre Spann MD   Primary Care Physician: Magen Christensen MD  Principal Problem:Severe sepsis    Inpatient consult to Nephrology  Consult performed by: Agustin Rai, NP  Consult ordered by: Kevyn Toney MD  Reason for consult: ESRD        Subjective:     HPI: Ms. Suyapa Connelly is a 57 y/o F with hx of PVD requiring bilateral LE AKAs, ESRD on HD (MWF), IDDM2, CAD s/p CABG, Afib on eliquis, debility who initially presented to the ED via EMS after an episode of unresponsiveness at home. Patient was apparently at her mental baseline this morning, when she suddenly became unresponsive per . EMS was called and were unable to arouse patient. She was brought into the ED and intubated shortly after arrival. Of note, patient has had 6 different hospitalizations over the past year related to encephalopathy. Patient was most recently admitted 3/22-3/23/24 for evaluation of steady cognitive decline over the past year. It appears patient had declined over the past year to the extent that she is unable to perform independent ADLs.  not ready for NH placement as of yet.  Information was obtained from chart review and .  Of note, patient missed her HD session on Monday prior to presentation for unknown reason.    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    Osteomyelitis     Osteomyelitis of left foot 04/30/2021    Ulcer of left foot     Vaginal delivery     x1       Past Surgical History:   Procedure Laterality Date    ABOVE-KNEE AMPUTATION  Left 5/18/2021    Procedure: AMPUTATION, ABOVE KNEE;  Surgeon: Teddy Huber MD;  Location: [facility] OR [address] [place] [address];  Service: Vascular;  Laterality: Left;    ABOVE-KNEE AMPUTATION Right 3/18/2022    Procedure: AMPUTATION, ABOVE KNEE;  Surgeon: DAYNE Florez II, MD;  [facility];  Service: Vascular;  Laterality: Right;    Angiogram - Right Extremity Right 7/9/15    angiogram-left leg  10/6/15    ANGIOGRAPHY OF LOWER EXTREMITY Left 4/29/2021    Procedure: ANGIOGRAM, LOWER EXTREMITY;  Surgeon: Teddy Huber MD;  Location: [facility] OR [address] [place] [address];  Service: Vascular;  Laterality: Left;    BELOW KNEE AMPUTATION OF LOWER EXTREMITY Right 12/28/2021    Procedure: AMPUTATION, BELOW KNEE;  Surgeon: Kaitlyn Rojas MD;  Location: [facility] OR;  Service: General;  Laterality: Right;    CATHETERIZATION OF BOTH LEFT AND RIGHT HEART N/A 12/18/2019    Procedure: CATHETERIZATION, HEART, BOTH LEFT AND RIGHT;  Surgeon: Que Fernando III, MD;  Location: [facility] CATH LAB;  Service: Cardiology;  Laterality: N/A;    CORONARY ANGIOGRAPHY N/A 12/18/2019    Procedure: ANGIOGRAM, CORONARY ARTERY;  Surgeon: Que Fernando III, MD;  Location: [facility] CATH LAB;  Service: Cardiology;  Laterality: N/A;    CORONARY ANGIOGRAPHY INCLUDING BYPASS GRAFTS WITH CATHETERIZATION OF LEFT HEART N/A 7/28/2020    Procedure: ANGIOGRAM, CORONARY, INCLUDING BYPASS GRAFT, WITH LEFT HEART CATHETERIZATION, 9 am;  Surgeon: Rachel Easley MD;  Location: [facility] CATH LAB;  Service: Cardiology;  Laterality: N/A;    CORONARY ARTERY BYPASS GRAFT (CABG) N/A 1/14/2020    Procedure: CORONARY ARTERY BYPASS GRAFT (CABG) x 1     Off Pump;  Surgeon: Huang Altamirano MD;  Location: [address];  Service: Cardiovascular;  Laterality: N/A;    CREATION OF FEMORAL-TIBIAL ARTERY BYPASS Left 4/29/2021    Procedure: CREATION, BYPASS, ARTERIAL, FEMORAL TO ANTERIOR TIBIAL;  Surgeon: Teddy Huber MD;  Location: [facility] OR [address] [place] [address];  Service: Vascular;   Laterality: Left;    CREATION OF FEMOROPOPLITEAL ARTERIAL BYPASS USING GRAFT Left 8/18/2020    Procedure: CREATION, BYPASS, ARTERIAL, FEMORAL TO POPLITEAL, USING GRAFT, LEFT LOWER EXTREMITY;  Surgeon: Teddy Huber MD;  Location: [facility] OR;  Service: Vascular;  Laterality: Left;  REQUEST 7:15 A.M. START----COVID NEGATIVE ON 8/17  1ST CASE STARTE PER LEANA ON 8/7/2020 @ 942AM-  RN PREOP 8/12/2020   T/S-----CLEARED BY CARDS-------PENDING INSURANCE    DEBRIDEMENT OF FOOT Left 9/8/2020    Procedure: DEBRIDEMENT, FOOT;  Surgeon: Rosio Mayes DPM;  Location: [facility] OR;  Service: Podiatry;  Laterality: Left;  request neoxx .   RN Pre Op 9-4-2020, Covid negative on 9/5/20. C A    DEBRIDEMENT OF FOOT  3/4/2021    Procedure: DEBRIDEMENT, FOOT;  Surgeon: Teddy Huber MD;  Location: [facility] OR;  Service: Vascular;;    DEBRIDEMENT OF FOOT Left 3/9/2021    Procedure: DEBRIDEMENT, FOOT, bone biopsy;  Surgeon: Rosio Mayes DPM;  Location: [facility] OR;  Service: Podiatry;  Laterality: Left;  Request neoxx---COVID IN AM  REQUESTING NOON START  RN Phone Pre op.On Blood thinners Plavix and Eliquis.  Covid am of surgery. C A    DEBRIDEMENT OF FOOT Left 5/4/2021    Procedure: DEBRIDEMENT, FOOT;  Surgeon: Farooq Morley DPM;  Location: [address];  Service: Podiatry;  Laterality: Left;    INSERTION OF TUNNELED CENTRAL VENOUS HEMODIALYSIS CATHETER N/A 1/27/2020    Procedure: Insertion, Catheter, Central Venous, Hemodialysis;  Surgeon: ESTEBAN Gomez III, MD;  Location: [facility] CATH LAB;  Service: Peripheral Vascular;  Laterality: N/A;    INSERTION OF TUNNELED CENTRAL VENOUS HEMODIALYSIS CATHETER  5/10/2023    Procedure: Insertion, Catheter, Central Venous, Hemodialysis;  Surgeon: Romulo Queen MD;  Location: [facility] CATH LAB;  Service: Cardiology;;    PERCUTANEOUS TRANSLUMINAL ANGIOPLASTY N/A 3/4/2021    Procedure: PTA (ANGIOPLASTY, PERCUTANEOUS, TRANSLUMINAL);  Surgeon: Teddy Huber MD;  Location: [facility] OR;   Service: Vascular;  Laterality: N/A;    REMOVAL OF ARTERIOVENOUS GRAFT Left 5/27/2021    Procedure: REMOVAL, GRAFT, LEFT LOWER EXTREMITY, WOUND EXPLORATION;  Surgeon: Teddy Huber MD;  Location: [facility] OR 2ND FLR;  Service: Vascular;  Laterality: Left;    REMOVAL OF NAIL OF DIGIT Left 3/9/2021    Procedure: REMOVAL, NAIL, DIGIT;  Surgeon: Rosio Mayes DPM;  Location: [facility] OR;  Service: Podiatry;  Laterality: Left;    RIGHT HEART CATHETERIZATION Right 5/10/2023    Procedure: INSERTION, CATHETER, RIGHT HEART;  Surgeon: Romulo Queen MD;  Location: [facility] CATH LAB;  Service: Cardiology;  Laterality: Right;    THROMBECTOMY Left 3/4/2021    Procedure: THROMBECTOMY, LEFT LOWER EXTREMITY BYPASS GRAFT, ANGIOGRAM, POSSIBLE INTERVENTION, POSSIBLE LEFT LOWER EXTREMITY BYPASS;  Surgeon: Teddy Huber MD;  Location: [facility] OR;  Service: Vascular;  Laterality: Left;    THROMBECTOMY Left 4/29/2021    Procedure: GRAFT THROMBECTOMY, LEFT LOWER EXTREMITY;  Surgeon: Teddy Huber MD;  [facility];  Service: Vascular;  Laterality: Left;  14.5 min  1179.85 mGy  341.01 Gycm2  240 ml dye    THROMBECTOMY  10/22/2021    Procedure: THROMBECTOMY;  Surgeon: Saad Arenas MD;  Location: [facility] CATH LAB/EP;  Service: Cardiology;;       Review of patient's allergies indicates:   Allergen Reactions    Ciprofloxacin Itching    Contrast media      Kidney injury    Iodine      Kidney injury    Pcn [penicillins]      Rash; tolerated ceftriaxone on 1/13/20     Current Facility-Administered Medications   Medication Frequency    dextrose 10% bolus 125 mL 125 mL PRN    dextrose 10% bolus 250 mL 250 mL PRN    famotidine (PF) injection 20 mg Daily    fentaNYL 50 mcg/mL injection 50 mcg Q1H PRN    glucagon (human recombinant) injection 1 mg PRN    heparin 25,000 units in dextrose 5% (100 units/ml) IV bolus from bag LOW INTENSITY nomogram - [facility] PRN    heparin 25,000 units in dextrose 5% (100 units/ml) IV bolus from bag  LOW INTENSITY nomogram - [facility] PRN    heparin 25,000 units in dextrose 5% 250 mL (100 units/mL) infusion LOW INTENSITY nomogram - [facility] Continuous    insulin aspart U-100 pen 0-10 Units Q6H PRN    [START ON 5/9/2024] meropenem (MERREM) 500 mg in sodium chloride 0.9 % 100 mL IVPB (MB+) Q12H    sodium chloride 0.9% flush 10 mL PRN    vancomycin - pharmacy to dose pharmacy to manage frequency     Family History       Problem Relation (Age of Onset)    Diabetes Mother, Father, Paternal Grandmother    Heart disease Maternal Grandmother    No Known Problems Maternal Grandfather, Paternal Grandfather          Tobacco Use    Smoking status: Former    Smokeless tobacco: Never   Substance and Sexual Activity    Alcohol use: No    Drug use: Yes     Types: Marijuana     Comment: occassional    Sexual activity: Yes     Partners: Male     Review of Systems   Unable to perform ROS: Mental status change     Objective:     Vital Signs (Most Recent):  Temp: 98.1 °F (36.7 °C) (05/08/24 1000)  Pulse: 84 (05/08/24 1000)  Resp: 17 (05/08/24 1000)  BP: 121/69 (05/08/24 1000)  SpO2: 100 % (05/08/24 1000) Vital Signs (24h Range):  Temp:  [96.8 °F (36 °C)-99.1 °F (37.3 °C)] 98.1 °F (36.7 °C)  Pulse:  [69-99] 84  Resp:  [] 17  SpO2:  [85 %-100 %] 100 %  BP: (107-139)/(55-83) 121/69     Weight: 69.8 kg (153 lb 14.1 oz) (05/08/24 1031)  Body mass index is 46.96 kg/m².  Body surface area is 1.54 meters squared.    I/O last 3 completed shifts:  In: 343.5 [I.V.:215.8; IV Piggyback:127.6]  Out: 365 [Urine:365]     Physical Exam  Vitals and nursing note reviewed.   Constitutional:       General: She is not in acute distress.     Appearance: She is ill-appearing.   HENT:      Head: Normocephalic and atraumatic.      Mouth/Throat:      Mouth: Mucous membranes are moist.      Pharynx: No oropharyngeal exudate or posterior oropharyngeal erythema.   Eyes:      General: No scleral icterus.        Right eye: No discharge.         Left eye: No discharge.       Extraocular Movements: Extraocular movements intact.      Conjunctiva/sclera: Conjunctivae normal.   Cardiovascular:      Rate and Rhythm: Normal rate and regular rhythm.      Heart sounds: No murmur heard.     No friction rub. No gallop.   Pulmonary:      Effort: Pulmonary effort is normal. No respiratory distress.      Breath sounds: Normal breath sounds. No wheezing or rales.   Abdominal:      General: Bowel sounds are normal. There is no distension.      Palpations: Abdomen is soft.      Tenderness: There is no abdominal tenderness.   Musculoskeletal:         General: Deformity (bilateral AKA) present.      Cervical back: Normal range of motion and neck supple.   Skin:     General: Skin is warm and dry.   Neurological:      Mental Status: She is disoriented.      Comments: alert          Significant Labs:  CBC:   Recent Labs   Lab 05/08/24  0130   WBC 6.00   RBC 3.46*   HGB 10.9*   HCT 33.2*      MCV 96   MCH 31.5*   MCHC 32.8     CMP:   Recent Labs   Lab 05/08/24  0600   GLU 95   CALCIUM 8.8   ALBUMIN 2.2*   PROT 7.5      K 4.5   CO2 18*      BUN 59*   CREATININE 7.6*   ALKPHOS 139*   ALT 10   AST 22   BILITOT 0.9       Assessment/Plan:     Neuro  Encephalopathy  -unclear etiology  -septic vs metabolic  -will plan for HD today and monitor response.    Renal/  ESRD (end stage renal disease)  ESRD on iHD MWF  Dr. Jesika BRICEÑO  + residual renal fx    Missed HD session on Monday prior to presentation.    Plan/Recommendations:  -Will plan for HD today for metabolic clearance/volume management  -UF 1-2L as tolerated  -strict I/O's  -Renal diet when advanced  -phos WNL, will monitor for now.  No need for phos binders at this time  -Hgb @ goal for ESRD, continue trending.  No need for NAEEM therapy.    Oncology  Anemia due to chronic kidney disease, on chronic dialysis  @ goal  -continue trending      Agustin Vang, NP  Nephrology  [facility] - Cardiac Medical ICU

## 2024-05-08 NOTE — SUBJECTIVE & OBJECTIVE
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    Osteomyelitis     Osteomyelitis of left foot 04/30/2021    Ulcer of left foot     Vaginal delivery     x1       Past Surgical History:   Procedure Laterality Date    ABOVE-KNEE AMPUTATION Left 5/18/2021    Procedure: AMPUTATION, ABOVE KNEE;  Surgeon: Teddy Huber MD;  Location: [facility] OR [address] [place] [address];  Service: Vascular;  Laterality: Left;    ABOVE-KNEE AMPUTATION Right 3/18/2022    Procedure: AMPUTATION, ABOVE KNEE;  Surgeon: DAYNE Florez II, MD;  Location: [facility] OR [address] [place] [address];  Service: Vascular;  Laterality: Right;    Angiogram - Right Extremity Right 7/9/15    angiogram-left leg  10/6/15    ANGIOGRAPHY OF LOWER EXTREMITY Left 4/29/2021    Procedure: ANGIOGRAM, LOWER EXTREMITY;  Surgeon: Teddy Huber MD;  Location: [facility] OR [address] [place] [address];  Service: Vascular;  Laterality: Left;    BELOW KNEE AMPUTATION OF LOWER EXTREMITY Right 12/28/2021    Procedure: AMPUTATION, BELOW KNEE;  Surgeon: Kaitlyn Rojas MD;  Location: [facility] OR;  Service: General;  Laterality: Right;    CATHETERIZATION OF BOTH LEFT AND RIGHT HEART N/A 12/18/2019    Procedure: CATHETERIZATION, HEART, BOTH LEFT AND RIGHT;  Surgeon: Que Fernando III, MD;  Location: [facility] CATH LAB;  Service: Cardiology;  Laterality: N/A;    CORONARY ANGIOGRAPHY N/A 12/18/2019    Procedure: ANGIOGRAM, CORONARY ARTERY;  Surgeon: Que Fernando III, MD;  Location: [facility] CATH LAB;  Service: Cardiology;  Laterality: N/A;    CORONARY ANGIOGRAPHY INCLUDING BYPASS GRAFTS WITH CATHETERIZATION OF LEFT HEART N/A 7/28/2020    Procedure: ANGIOGRAM, CORONARY, INCLUDING BYPASS GRAFT, WITH LEFT HEART CATHETERIZATION, 9 am;  Surgeon: Rachel Easley MD;   Location: [facility] CATH LAB;  Service: Cardiology;  Laterality: N/A;    CORONARY ARTERY BYPASS GRAFT (CABG) N/A 1/14/2020    Procedure: CORONARY ARTERY BYPASS GRAFT (CABG) x 1     Off Pump;  Surgeon: Huang Altamirano MD;  Location: [facility] OR [address] [place] [address];  Service: Cardiovascular;  Laterality: N/A;    CREATION OF FEMORAL-TIBIAL ARTERY BYPASS Left 4/29/2021    Procedure: CREATION, BYPASS, ARTERIAL, FEMORAL TO ANTERIOR TIBIAL;  Surgeon: Teddy Huber MD;  Location: [facility] OR [address] [place] [address];  Service: Vascular;  Laterality: Left;    CREATION OF FEMOROPOPLITEAL ARTERIAL BYPASS USING GRAFT Left 8/18/2020    Procedure: CREATION, BYPASS, ARTERIAL, FEMORAL TO POPLITEAL, USING GRAFT, LEFT LOWER EXTREMITY;  Surgeon: Teddy Huber MD;  Location: [facility];  Service: Vascular;  Laterality: Left;  REQUEST 7:15 A.M. START----COVID NEGATIVE ON 8/17 1ST CASE STARTE PER LEANA ON 8/7/2020 @ 942AM-  RN PREOP 8/12/2020   T/S-----CLEARED BY CARDS-------PENDING INSURANCE    DEBRIDEMENT OF FOOT Left 9/8/2020    Procedure: DEBRIDEMENT, FOOT;  Surgeon: Rosio Mayes DPM;  Location: [facility];  Service: Podiatry;  Laterality: Left;  request neoxx .   RN Pre Op 9-4-2020, Covid negative on 9/5/20. C A    DEBRIDEMENT OF FOOT  3/4/2021    Procedure: DEBRIDEMENT, FOOT;  Surgeon: Teddy Huber MD;  Location: [facility] OR;  Service: Vascular;;    DEBRIDEMENT OF FOOT Left 3/9/2021    Procedure: DEBRIDEMENT, FOOT, bone biopsy;  Surgeon: Rosio Mayes DPM;  Location: [facility] OR;  Service: Podiatry;  Laterality: Left;  Request neoxx---COVID IN AM  REQUESTING NOON START  RN Phone Pre op.On Blood thinners Plavix and Eliquis.  Covid am of surgery. C A    DEBRIDEMENT OF FOOT Left 5/4/2021    Procedure: DEBRIDEMENT, FOOT;  Surgeon: Farooq Morley DPM;  Location: [address];  Service: Podiatry;  Laterality: Left;    INSERTION OF TUNNELED CENTRAL VENOUS HEMODIALYSIS CATHETER N/A 1/27/2020    Procedure: Insertion, Catheter, Central Venous,  Hemodialysis;  Surgeon: ESTEBAN Gomez III, MD;  Location: [facility] CATH LAB;  Service: Peripheral Vascular;  Laterality: N/A;    INSERTION OF TUNNELED CENTRAL VENOUS HEMODIALYSIS CATHETER  5/10/2023    Procedure: Insertion, Catheter, Central Venous, Hemodialysis;  Surgeon: Romulo Queen MD;  Location: [facility] CATH LAB;  Service: Cardiology;;    PERCUTANEOUS TRANSLUMINAL ANGIOPLASTY N/A 3/4/2021    Procedure: PTA (ANGIOPLASTY, PERCUTANEOUS, TRANSLUMINAL);  Surgeon: Teddy Huber MD;  Location: [facility] OR;  Service: Vascular;  Laterality: N/A;    REMOVAL OF ARTERIOVENOUS GRAFT Left 5/27/2021    Procedure: REMOVAL, GRAFT, LEFT LOWER EXTREMITY, WOUND EXPLORATION;  Surgeon: Teddy Huber MD;  Location: [facility] OR 2ND FLR;  Service: Vascular;  Laterality: Left;    REMOVAL OF NAIL OF DIGIT Left 3/9/2021    Procedure: REMOVAL, NAIL, DIGIT;  Surgeon: Rosio Mayes DPM;  Location: [facility] OR;  Service: Podiatry;  Laterality: Left;    RIGHT HEART CATHETERIZATION Right 5/10/2023    Procedure: INSERTION, CATHETER, RIGHT HEART;  Surgeon: Romulo Queen MD;  Location: [facility] CATH LAB;  Service: Cardiology;  Laterality: Right;    THROMBECTOMY Left 3/4/2021    Procedure: THROMBECTOMY, LEFT LOWER EXTREMITY BYPASS GRAFT, ANGIOGRAM, POSSIBLE INTERVENTION, POSSIBLE LEFT LOWER EXTREMITY BYPASS;  Surgeon: Teddy Huber MD;  Location: [facility] OR;  Service: Vascular;  Laterality: Left;    THROMBECTOMY Left 4/29/2021    Procedure: GRAFT THROMBECTOMY, LEFT LOWER EXTREMITY;  Surgeon: Teddy Huber MD;  Location: [facility] OR 2ND FLR;  Service: Vascular;  Laterality: Left;  14.5 min  1179.85 mGy  341.01 Gycm2  240 ml dye    THROMBECTOMY  10/22/2021    Procedure: THROMBECTOMY;  Surgeon: Saad Arenas MD;  Location: [facility] CATH LAB/EP;  Service: Cardiology;;       Review of patient's allergies indicates:   Allergen Reactions    Ciprofloxacin Itching    Contrast media      Kidney injury    Iodine      Kidney injury    Pcn  [penicillins]      Rash; tolerated ceftriaxone on 1/13/20       Family History       Problem Relation (Age of Onset)    Diabetes Mother, Father, Paternal Grandmother    Heart disease Maternal Grandmother    No Known Problems Maternal Grandfather, Paternal Grandfather          Tobacco Use    Smoking status: Former    Smokeless tobacco: Never   Substance and Sexual Activity    Alcohol use: No    Drug use: Yes     Types: Marijuana     Comment: occassional    Sexual activity: Yes     Partners: Male      Review of Systems   Unable to perform ROS: Intubated     Objective:     Vital Signs (Most Recent):  Temp: 99.1 °F (37.3 °C) (05/07/24 2300)  Pulse: 89 (05/07/24 2300)  Resp: (!) 21 (05/07/24 2300)  BP: 131/73 (05/07/24 2300)  SpO2: 97 % (05/07/24 2300) Vital Signs (24h Range):  Temp:  [98.8 °F (37.1 °C)-99.1 °F (37.3 °C)] 99.1 °F (37.3 °C)  Pulse:  [87-99] 89  Resp:  [20-27] 21  SpO2:  [96 %-100 %] 97 %  BP: (107-137)/(56-83) 131/73   Weight: 71.7 kg (158 lb)  Body mass index is 48.21 kg/m².    No intake or output data in the 24 hours ending 05/07/24 2322       Physical Exam  Vitals and nursing note reviewed.   Constitutional:       General: She is not in acute distress.     Appearance: She is ill-appearing. She is not toxic-appearing or diaphoretic.   HENT:      Head: Normocephalic and atraumatic.      Mouth/Throat:      Mouth: Mucous membranes are dry.      Comments: Very poor dentition  Eyes:      Extraocular Movements: Extraocular movements intact.      Conjunctiva/sclera: Conjunctivae normal.      Pupils: Pupils are equal, round, and reactive to light.   Cardiovascular:      Rate and Rhythm: Normal rate and regular rhythm.      Pulses: Normal pulses.      Heart sounds: Normal heart sounds. No murmur heard.     No friction rub. No gallop.   Pulmonary:      Comments: Intubated  Bilateral breath sounds  Genitourinary:     Comments: Cloudy urine present in snow bag  Musculoskeletal:      Comments: B/t AKA noted  L  upper chest TDC noted   Skin:     General: Skin is warm and dry.   Neurological:      Comments: Patient awake on the vent, tracks and follows commands            Vents:  Vent Mode: A/C (05/07/24 2114)  Ventilator Initiated: Yes (05/07/24 2114)  Set Rate: 18 BPM (05/07/24 2114)  Vt Set: 350 mL (05/07/24 2114)  PEEP/CPAP: 5 cmH20 (05/07/24 2114)  Oxygen Concentration (%): 30 (05/07/24 2300)  Peak Airway Pressure: 20 cmH20 (05/07/24 2114)  Plateau Pressure: 0 cmH20 (05/07/24 2114)  Total Ve: 8.44 L/m (05/07/24 2114)  Negative Inspiratory Force (cm H2O): 0 (05/07/24 2114)  F/VT Ratio<105 (RSBI): (!) 66.67 (05/07/24 2114)  Lines/Drains/Airways       Central Venous Catheter Line  Duration                  Hemodialysis Catheter 05/25/23 1336 left internal jugular 348 days              Airway  Duration                  Airway - Non-Surgical 05/07/24 2110 Endotracheal Tube <1 day              Peripheral Intravenous Line  Duration                  Peripheral IV - Single Lumen 05/07/24 2100 20 G Anterior;Right Hand <1 day         Peripheral IV - Single Lumen 05/07/24 2106 20 G Anterior;Left Wrist <1 day                  Significant Labs:    CBC/Anemia Profile:  Recent Labs   Lab 05/07/24 2059 05/07/24 2104   WBC  --  5.25   HGB  --  11.8*   HCT 38 35.5*   PLT  --  199   MCV  --  96   RDW  --  16.8*        Chemistries:  Recent Labs   Lab 05/07/24 2104      K 5.1      CO2 23   BUN 59*   CREATININE 7.5*   CALCIUM 9.0   ALBUMIN 2.7*   PROT 8.5*   BILITOT 0.8   ALKPHOS 164*   ALT 10   AST 20   MG 2.2       CMP:   Recent Labs   Lab 05/07/24 2104      K 5.1      CO2 23   *   BUN 59*   CREATININE 7.5*   CALCIUM 9.0   PROT 8.5*   ALBUMIN 2.7*   BILITOT 0.8   ALKPHOS 164*   AST 20   ALT 10   ANIONGAP 13     Lactic Acid:   Recent Labs   Lab 05/07/24  2104   LACTATE 1.3     Urine Studies:   Recent Labs   Lab 05/07/24  2223   COLORU [place]*   APPEARANCEUA Ex.Turbid   PHUR 6.0   SPECGRAV 1.015   PROTEINUA  3+*   GLUCUA Negative   KETONESU Negative   BILIRUBINUA Negative   OCCULTUA 3+*   NITRITE Negative   LEUKOCYTESUR 3+*   RBCUA 51*   WBCUA >100*   BACTERIA [place]*   SQUAMEPITHEL 1   HYALINECASTS 1       Significant Imaging: I have reviewed all pertinent imaging results/findings within the past 24 hours.    X-Ray Chest AP Portable   Final Result      New devices as above.      No new infiltrate or effusion with decreased size of the cardiac silhouette.         Electronically signed by: Rush Deal   Date:    05/07/2024   Time:    22:22      CTA STROKE MULTI-PHASE   Final Result      No CT findings of large territorial infarction.  Additional evaluation MRI of the brain may be obtained, as clinically warranted.      Unchanged distention of the ventricular system.      No high-grade stenosis or major vessel occlusion.      Electronically signed by resident: Екатерина Peck   Date:    05/07/2024   Time:    21:45      Electronically signed by: Chele Kumar MD   Date:    05/07/2024   Time:    22:16      MRI Brain Without Contrast    (Results Pending)

## 2024-05-08 NOTE — HPI
Ms. Suyapa Connelly is a 57 y/o F with hx of PVD requiring bilateral LE AKAs, ESRD on HD (MWF), IDDM2, CAD s/p CABG, Afib on eliquis, debility who initially presented to the ED via EMS after an episode of unresponsiveness at home. Patient was apparently at her mental baseline this morning, when she suddenly became unresponsive per . EMS was called and were unable to arouse patient. She was brought into the ED and intubated shortly after arrival. Of note, patient has had 6 different hospitalizations over the past year related to encephalopathy. Patient was most recently admitted 3/22-3/23/24 for evaluation of steady cognitive decline over the past year. It appears patient had declined over the past year to the extent that she is unable to perform independent ADLs.  not ready for NH placement as of yet.  Information was obtained from chart review and .  Of note, patient missed her HD session on Monday prior to presentation for unknown reason.

## 2024-05-08 NOTE — HPI
Ms. Suyapa Connelly is a 59 y/o F with hx of severe PVD ultimately requiring bilateral LE AKAs, ESRD on HD, IDDM2, CAD s/p CABG, Afib on eliquis, debility who initially presented to the ED via EMS after an episode of unresponsiveness at home. Patient was apparently at her mental baseline this morning, when she suddenly became unresponsive per . EMS was called and were unable to arouse patient. She was brought into the ED and intubated shortly after arrival. Of note, patient has had 6 different hospitalizations over the past year related to encephalopathy. Patient was most recently admitted 3/22-3/23/24 for evaluation of steady cognitive decline over the past year. It appears patient had declined over the past year to the extent that she is unable to perform independent ADLs.  not ready for NH placement as of yet.    In the ED, patient afebrile, SBP 120s, HR 80s, intubated at 40% FiO2 Peep 5. CBC grossly unchanged from baseline, no leukocytosis or worsening anemia. CMP without significant electrolyte abnormalities, BUN 59, BGL 130s. Lactic wnl. UA c/f infection with >100 wbc and many bacteria. UDS pan-negative. pH wnl and no hypercarbia on VBG. Patient admitted to MICU for continued management.

## 2024-05-08 NOTE — ASSESSMENT & PLAN NOTE
58 year old female w/ extensive medical history as below who presents with acute onset of altered mental status/decreased responsiveness onset around 1900. Has a history of episodes similar to this in the past. Patient was intubated and a stroke code was called. She reportedly did not seem to have any focal deficits prior to intubation. CT/CTA did not show large infarction or hemorrhage and no LVO was seen.     Recommendations:  -Low suspicion for acute neurovascular event causing patients presentation given normal CTA and the patient's history of similar presentations  -Can obtain MRI to further evaluate  -Please contact stroke team with any questions

## 2024-05-08 NOTE — PLAN OF CARE
Problem: SLP  Goal: SLP Goal  Description: Speech Pathology Goals  To be met by 5/22/24    1. Pt will participate in ongoing diagnostic dysphagia therapy          Outcome: Progressing     Clinical swallow evaluation completed. Recommend strict NPO status with meds via alternative routes. SLP to continue to follow.

## 2024-05-08 NOTE — HPI
Suyapa Connelly is a 58 y.o. female w/ extensive PMH including PVD, T2DM, CAROLIN, CAD s/p CABG, HFrEF, anemia, ESRD on HD, pAF (eliquis), bilateral AKA, and morbid obesity who presented to the ED on 5/7 for episode of unresponsiveness that started approximately 1 hour prior to arrival. Patient intubated and then a stroke code was called. Unable to obtain exam at time of stroke code given that paralytics were given for intubation. Per chart review appears patient has had multiple episodes of similar presentations like this. Recently was attributed to home CNS depressants and anticholinergics.

## 2024-05-08 NOTE — ASSESSMENT & PLAN NOTE
Recent Labs   Lab 05/07/24  2104   WBC 5.25   RBC 3.71*   HGB 11.8*   HCT 35.5*      MCV 96   MCH 31.8*   MCHC 33.2     -- Type and screen   -- CBC daily   -- Transfuse for hgb < 7

## 2024-05-08 NOTE — ASSESSMENT & PLAN NOTE
58 F h/o severe PVD s/p b/l LE AKA, ESRD on HD, IDDM2, CAD s/p CABG, Afib (on eliquis) who presented after being found unresponsive at home. She has 3 peripheral IV, LIJ HD catheter placed 1 year ago.   MRI brain without acute abnormality, simialry with CTA stroke.  Prior urine cx from 12/02 have grown proteus mirabilis (pan sensitive) and urine cx from 3/11 grew ESBL Klebsiella pneumoniae. AF, HR 70-80, -130/50-60s, on 30% FiO2/5.  Labs without leukocytosis WBC of 5.25 on admission, and 6 today. Lactic acid 1.3. UDS negative. UA 3+ leukocytes, >100 WBC., many bacteria and WBC clumping    Infectious Disease consulted for prior h/o ESBL (recurrent UTI) admitted for AMS and urosepsis, started on meropenem. Prior urine cx from 12/02/23  have grown proteus mirabilis (pan sensitive) amd urine cx from 3/11/22 grew ESBL Klebsiella pneumoniae. Worsening encephalopathy could be 2/2 UTI with concomitant failure to thrive as noted during prior hospitalizations. Low suspicion for CNS infection at this time.     Recommendations:   - fu blood cultures from 5/7  - fu urine cultures   - recommend discontinuing vancomycin.   - recommend continuing meropenem until culture results return, then will de-escalate as appropriate

## 2024-05-08 NOTE — PROGRESS NOTES
Pharmacokinetic Initial Assessment: IV Vancomycin    Assessment/Plan:    Initiate intravenous vancomycin with loading dose of 1500 mg once with subsequent doses when random concentrations are less than 20 mcg/mL  Desired empiric serum trough concentration is 10 to 20 mcg/mL  ESRD on HD; nephro recs pending   Draw vancomycin random level on 5/9/24 with AM labs.  Pharmacy will continue to follow and monitor vancomycin.      Please contact pharmacy at extension [phone] with any questions regarding this assessment.     Thank you for the consult,   Alexander Garcia       Patient brief summary:  Suyapa Connelly is a 58 y.o. female initiated on antimicrobial therapy with IV Vancomycin for treatment of suspected sepsis    Drug Allergies:   Review of patient's allergies indicates:   Allergen Reactions    Ciprofloxacin Itching    Contrast media      Kidney injury    Iodine      Kidney injury    Pcn [penicillins]      Rash; tolerated ceftriaxone on 1/13/20       Actual Body Weight:   69.8 kg    Renal Function:   Estimated Creatinine Clearance: 6.1 mL/min (A) (based on SCr of 7.6 mg/dL (H)).,     Dialysis Method (if applicable):  intermittent HD    CBC (last 72 hours):  Recent Labs   Lab Result Units 05/07/24 2104 05/08/24  0130   WBC K/uL 5.25 6.00   Hemoglobin g/dL 11.8* 10.9*   Hematocrit % 35.5* 33.2*   Platelets K/uL 199 184   Gran % % 70.0 63.7   Lymph % % 15.0* 19.7   Mono % % 13.0 14.8   Eosinophil % % 0.8 0.5   Basophil % % 1.0 0.8   Differential Method  Automated Automated       Metabolic Panel (last 72 hours):  Recent Labs   Lab Result Units 05/07/24 2104 05/07/24  2223 05/08/24  0600   Sodium mmol/L 139  --  136   Potassium mmol/L 5.1  --  4.5   Chloride mmol/L 103  --  103   CO2 mmol/L 23  --  18*   Glucose mg/dL 133*  --  95   Glucose, UA   --  Negative  --    BUN mg/dL 59*  --  59*   Creatinine mg/dL 7.5*  --  7.6*   Creatinine, Urine mg/dL  --  99.0  --    Albumin g/dL 2.7*  --  2.2*   Total Bilirubin mg/dL 0.8   "--  0.9   Alkaline Phosphatase U/L 164*  --  139*   AST U/L 20  --  22   ALT U/L 10  --  10   Magnesium mg/dL 2.2  --  2.2   Phosphorus mg/dL  --   --  3.7       Drug levels (last 3 results):  No results for input(s): "VANCOMYCINRA", "VANCORANDOM", "VANCOMYCINPE", "VANCOPEAK", "VANCOMYCINTR", "VANCOTROUGH" in the last 72 hours.    Microbiologic Results:  Microbiology Results (last 7 days)       Procedure Component Value Units Date/Time    Blood culture #1 [[id number]] Collected: 05/07/24 2104    Order Status: Completed Specimen: Blood from Peripheral, Hand, Right Updated: 05/08/24 0515     Blood Culture, Routine No Growth to date    Narrative:      Blood Culture #1    Blood culture #2 [[id number]] Collected: 05/07/24 2104    Order Status: Completed Specimen: Blood from Wrist, Left Updated: 05/08/24 0515     Blood Culture, Routine No Growth to date    Narrative:      Blood Culture #2    Urine culture [[id number]] Collected: 05/07/24 2223    Order Status: No result Specimen: Urine Updated: 05/07/24 2251            "

## 2024-05-08 NOTE — ASSESSMENT & PLAN NOTE
ESRD on iHD MWF  Dr. Jesika MATA TDC  + residual renal fx    Missed HD session on Monday prior to presentation.    Plan/Recommendations:  -Will plan for HD today for metabolic clearance/volume management  -UF 1-2L as tolerated  -strict I/O's  -Renal diet when advanced  -phos WNL, will monitor for now.  No need for phos binders at this time  -Hgb @ goal for ESRD, continue trending.  No need for NAEEM therapy.   Med rec complete per pt  Allergies reviewed  Pt denies taking any medications.

## 2024-05-08 NOTE — EICU
Nurses Note -- 4 Eyes      5/8/2024   12:53 AM      Skin assessed during: Admit      [] No Altered Skin Integrity Present    []Prevention Measures Documented      [x] Yes- Altered Skin Integrity Present or Discovered   [x] LDA Added if Not in Epic (Describe Wound)   [x] New Altered Skin Integrity was Present on Admit and Documented in LDA   [x] Wound Image Taken    Wound Care Consulted? Yes    Attending Nurse:  Naida Lin RN/Staff Member:  Inez

## 2024-05-08 NOTE — SUBJECTIVE & OBJECTIVE
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    Osteomyelitis     Osteomyelitis of left foot 04/30/2021    Ulcer of left foot     Vaginal delivery     x1       Past Surgical History:   Procedure Laterality Date    ABOVE-KNEE AMPUTATION Left 5/18/2021    Procedure: AMPUTATION, ABOVE KNEE;  Surgeon: Teddy Huber MD;  Location: [facility] OR [address] [place] [address];  Service: Vascular;  Laterality: Left;    ABOVE-KNEE AMPUTATION Right 3/18/2022    Procedure: AMPUTATION, ABOVE KNEE;  Surgeon: DAYNE Florez II, MD;  Location: [facility] OR [address] [place] [address];  Service: Vascular;  Laterality: Right;    Angiogram - Right Extremity Right 7/9/15    angiogram-left leg  10/6/15    ANGIOGRAPHY OF LOWER EXTREMITY Left 4/29/2021    Procedure: ANGIOGRAM, LOWER EXTREMITY;  Surgeon: Teddy Huber MD;  Location: [facility] OR [address] [place] [address];  Service: Vascular;  Laterality: Left;    BELOW KNEE AMPUTATION OF LOWER EXTREMITY Right 12/28/2021    Procedure: AMPUTATION, BELOW KNEE;  Surgeon: Kaitlyn Rojas MD;  Location: [facility] OR;  Service: General;  Laterality: Right;    CATHETERIZATION OF BOTH LEFT AND RIGHT HEART N/A 12/18/2019    Procedure: CATHETERIZATION, HEART, BOTH LEFT AND RIGHT;  Surgeon: Que Fernando III, MD;  Location: [facility] CATH LAB;  Service: Cardiology;  Laterality: N/A;    CORONARY ANGIOGRAPHY N/A 12/18/2019    Procedure: ANGIOGRAM, CORONARY ARTERY;  Surgeon: Que Fernando III, MD;  Location: [facility] CATH LAB;  Service: Cardiology;  Laterality: N/A;    CORONARY ANGIOGRAPHY INCLUDING BYPASS GRAFTS WITH CATHETERIZATION OF LEFT HEART N/A 7/28/2020    Procedure: ANGIOGRAM, CORONARY, INCLUDING BYPASS GRAFT, WITH LEFT HEART CATHETERIZATION, 9 am;  Surgeon: Rachel Easley MD;   Location: [facility] CATH LAB;  Service: Cardiology;  Laterality: N/A;    CORONARY ARTERY BYPASS GRAFT (CABG) N/A 1/14/2020    Procedure: CORONARY ARTERY BYPASS GRAFT (CABG) x 1     Off Pump;  Surgeon: Huang Altamirano MD;  Location: [facility] OR [address] [place] [address];  Service: Cardiovascular;  Laterality: N/A;    CREATION OF FEMORAL-TIBIAL ARTERY BYPASS Left 4/29/2021    Procedure: CREATION, BYPASS, ARTERIAL, FEMORAL TO ANTERIOR TIBIAL;  Surgeon: Teddy Huber MD;  Location: [facility] OR [address] [place] [address];  Service: Vascular;  Laterality: Left;    CREATION OF FEMOROPOPLITEAL ARTERIAL BYPASS USING GRAFT Left 8/18/2020    Procedure: CREATION, BYPASS, ARTERIAL, FEMORAL TO POPLITEAL, USING GRAFT, LEFT LOWER EXTREMITY;  Surgeon: Teddy Huber MD;  Location: [facility];  Service: Vascular;  Laterality: Left;  REQUEST 7:15 A.M. START----COVID NEGATIVE ON 8/17 1ST CASE STARTE PER LEANA ON 8/7/2020 @ 942AM-  RN PREOP 8/12/2020   T/S-----CLEARED BY CARDS-------PENDING INSURANCE    DEBRIDEMENT OF FOOT Left 9/8/2020    Procedure: DEBRIDEMENT, FOOT;  Surgeon: Rosio Mayes DPM;  Location: [facility];  Service: Podiatry;  Laterality: Left;  request neoxx .   RN Pre Op 9-4-2020, Covid negative on 9/5/20. C A    DEBRIDEMENT OF FOOT  3/4/2021    Procedure: DEBRIDEMENT, FOOT;  Surgeon: Teddy Huber MD;  Location: [facility] OR;  Service: Vascular;;    DEBRIDEMENT OF FOOT Left 3/9/2021    Procedure: DEBRIDEMENT, FOOT, bone biopsy;  Surgeon: Rosio Mayes DPM;  Location: [facility] OR;  Service: Podiatry;  Laterality: Left;  Request neoxx---COVID IN AM  REQUESTING NOON START  RN Phone Pre op.On Blood thinners Plavix and Eliquis.  Covid am of surgery. C A    DEBRIDEMENT OF FOOT Left 5/4/2021    Procedure: DEBRIDEMENT, FOOT;  Surgeon: Farooq Morley DPM;  Location: [address];  Service: Podiatry;  Laterality: Left;    INSERTION OF TUNNELED CENTRAL VENOUS HEMODIALYSIS CATHETER N/A 1/27/2020    Procedure: Insertion, Catheter, Central Venous,  Hemodialysis;  Surgeon: ESTEBAN Gomez III, MD;  Location: [facility] CATH LAB;  Service: Peripheral Vascular;  Laterality: N/A;    INSERTION OF TUNNELED CENTRAL VENOUS HEMODIALYSIS CATHETER  5/10/2023    Procedure: Insertion, Catheter, Central Venous, Hemodialysis;  Surgeon: Romulo Queen MD;  Location: [facility] CATH LAB;  Service: Cardiology;;    PERCUTANEOUS TRANSLUMINAL ANGIOPLASTY N/A 3/4/2021    Procedure: PTA (ANGIOPLASTY, PERCUTANEOUS, TRANSLUMINAL);  Surgeon: Teddy Huber MD;  Location: [facility] OR;  Service: Vascular;  Laterality: N/A;    REMOVAL OF ARTERIOVENOUS GRAFT Left 5/27/2021    Procedure: REMOVAL, GRAFT, LEFT LOWER EXTREMITY, WOUND EXPLORATION;  Surgeon: Teddy Huber MD;  Location: [facility] OR 2ND FLR;  Service: Vascular;  Laterality: Left;    REMOVAL OF NAIL OF DIGIT Left 3/9/2021    Procedure: REMOVAL, NAIL, DIGIT;  Surgeon: Rosio Mayes DPM;  Location: [facility] OR;  Service: Podiatry;  Laterality: Left;    RIGHT HEART CATHETERIZATION Right 5/10/2023    Procedure: INSERTION, CATHETER, RIGHT HEART;  Surgeon: Romulo Queen MD;  Location: [facility] CATH LAB;  Service: Cardiology;  Laterality: Right;    THROMBECTOMY Left 3/4/2021    Procedure: THROMBECTOMY, LEFT LOWER EXTREMITY BYPASS GRAFT, ANGIOGRAM, POSSIBLE INTERVENTION, POSSIBLE LEFT LOWER EXTREMITY BYPASS;  Surgeon: Teddy Huber MD;  Location: [facility] OR;  Service: Vascular;  Laterality: Left;    THROMBECTOMY Left 4/29/2021    Procedure: GRAFT THROMBECTOMY, LEFT LOWER EXTREMITY;  Surgeon: Teddy Huber MD;  Location: [facility] OR 2ND FLR;  Service: Vascular;  Laterality: Left;  14.5 min  1179.85 mGy  341.01 Gycm2  240 ml dye    THROMBECTOMY  10/22/2021    Procedure: THROMBECTOMY;  Surgeon: Saad Arenas MD;  Location: [facility] CATH LAB/EP;  Service: Cardiology;;       Review of patient's allergies indicates:   Allergen Reactions    Ciprofloxacin Itching    Contrast media      Kidney injury    Iodine      Kidney injury    Pcn  [penicillins]      Rash; tolerated ceftriaxone on 1/13/20       Medications:  Medications Prior to Admission   Medication Sig    acetaminophen (TYLENOL) 500 MG tablet Take 500 mg by mouth every 6 (six) hours as needed for Pain.    allopurinoL (ZYLOPRIM) 100 MG tablet Take 0.5 tablets (50 mg total) by mouth every other day.    apixaban (ELIQUIS) 5 mg Tab Take 1 tablet (5 mg total) by mouth 2 (two) times daily.    atorvastatin (LIPITOR) 80 MG tablet Take 1 tablet (80 mg total) by mouth once daily.    blood sugar diagnostic Strp To check BG 3 times daily, to use with insurance preferred meter    blood sugar diagnostic Strp Use to check blood glucose 2 (two) times a day.    blood-glucose meter Misc To check BG 3 times daily, to use with insurance preferred meter    blood-glucose meter Misc Use to check blood glucose 2 (two) times a day.    calcitRIOL (ROCALTROL) 0.5 MCG Cap Take 1 capsule (0.5 mcg total) by mouth once daily.    calcium acetate,phosphat bind, (PHOSLO) 667 mg capsule Take 1 capsule (667 mg total) by mouth 3 (three) times daily with meals.    carvediloL (COREG) 6.25 MG tablet Take 1 tablet (6.25 mg total) by mouth 2 (two) times daily.    clopidogreL (PLAVIX) 75 mg tablet Take 1 tablet (75 mg total) by mouth once daily.    famotidine (PEPCID) 20 MG tablet Take 1 tablet (20 mg total) by mouth 2 (two) times daily.    furosemide (LASIX) 40 MG tablet Take 1 tablet (40 mg total) by mouth 2 (two) times a day.    hydrALAZINE (APRESOLINE) 50 MG tablet Take 1 tablet (50 mg total) by mouth 3 (three) times daily.    insulin (LANTUS SOLOSTAR U-100 INSULIN) glargine 100 units/mL SubQ pen Inject 8 Units into the skin every evening.    insulin aspart, niacinamide, (FIASP FLEXTOUCH U-100 INSULIN) 100 unit/mL (3 mL) InPn Inject 1-5 Units into the skin before meals as needed (Per sliding scale). Blood sugar 150 to 200 add 1 units.  Blood sugar 201 to 250 add 2 units.  Blood sugar 251 to 300 add 3 units  Blood sugar 301 to  "350 add 4 units.  Blood sugar greater than 350 add 5 units.    isosorbide dinitrate (ISORDIL) 20 MG tablet Take 2 tablets (40 mg total) by mouth 3 (three) times daily.    lancets 30 gauge Misc Use to check blood glucose 2 (two) times a day.    lancets Misc To check BG 3 times daily, to use with insurance preferred meter    multivitamin (ONE DAILY MULTIVITAMIN) per tablet Take 1 tablet by mouth once daily.    pen needle, diabetic 32 gauge x 5/32" Ndle 1 Units by Misc.(Non-Drug; Combo Route) route before meals as needed (SSI).    pregabalin (LYRICA) 100 MG capsule Take 100 mg by mouth 2 (two) times daily.    sertraline (ZOLOFT) 100 MG tablet Take 1 tablet (100 mg total) by mouth once daily.    sevelamer carbonate (RENVELA) 800 mg Tab Take 2 tablets (1,600 mg total) by mouth 3 (three) times daily.    sodium bicarbonate 650 MG tablet TAKE 1 TABLET(650 MG) BY MOUTH THREE TIMES DAILY    traZODone (DESYREL) 50 MG tablet Take 0.5 tablets (25 mg total) by mouth every evening.     Antibiotics (From admission, onward)      Start     Stop Route Frequency Ordered    05/09/24 0100  meropenem (MERREM) 500 mg in sodium chloride 0.9 % 100 mL IVPB (MB+)         -- IV Every 12 hours (non-standard times) 05/08/24 1135    05/08/24 1100  vancomycin 1,500 mg in dextrose 5 % (D5W) 250 mL IVPB (Vial-Mate)         -- IV Once 05/08/24 0948    05/08/24 1042  vancomycin - pharmacy to dose  (vancomycin IVPB (PEDS and ADULTS))        Placed in "And" Linked Group    -- IV pharmacy to manage frequency 05/08/24 0942          Antifungals (From admission, onward)      None          Antivirals (From admission, onward)      None             Immunization History   Administered Date(s) Administered    COVID-19, MRNA, LN-S, PF (Pfizer) (Purple Cap) 03/09/2021, 03/30/2021    Hepatitis B, Adult 06/02/2023, 07/04/2023, 08/04/2023       Family History       Problem Relation (Age of Onset)    Diabetes Mother, Father, Paternal Grandmother    Heart disease " Maternal Grandmother    No Known Problems Maternal Grandfather, Paternal Grandfather          Social History     Socioeconomic History    Marital status:    Occupational History    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      Social Determinants of Health     Financial Resource Strain: Low Risk  (2/6/2024)    Overall Financial Resource Strain (CARDIA)     Difficulty of Paying Living Expenses: Not very hard   Food Insecurity: No Food Insecurity (2/6/2024)    Hunger Vital Sign     Worried About Running Out of Food in the Last Year: Never true     Ran Out of Food in the Last Year: Never true   Transportation Needs: No Transportation Needs (2/6/2024)    PRAPARE - Transportation     Lack of Transportation (Medical): No     Lack of Transportation (Non-Medical): No   Recent Concern: Transportation Needs - Unmet Transportation Needs (12/22/2023)    Received from [facility] Subsidiaries and Affiliates    PRAPARE - Transportation     Lack of Transportation (Medical): Yes     Lack of Transportation (Non-Medical): Yes   Physical Activity: Inactive (2/6/2024)    Exercise Vital Sign     Days of Exercise per Week: 0 days     Minutes of Exercise per Session: 0 min   Stress: No Stress Concern Present (2/6/2024)    [employer] [place] [employer] - Occupational Stress Questionnaire     Feeling of Stress : Only a little   Recent Concern: Stress - Stress Concern Present (12/2/2023)    [employer] [place] [employer] - Occupational Stress Questionnaire     Feeling of Stress : Rather much   Housing Stability: Low Risk  (2/6/2024)    Housing Stability Vital Sign     Unable to Pay for Housing in the Last  Year: No     Number of Places Lived in the Last Year: 1     Unstable Housing in the Last Year: No     Review of Systems   Unable to perform ROS: Mental status change     Objective:     Vital Signs (Most Recent):  Temp: 98.1 °F (36.7 °C) (05/08/24 1000)  Pulse: 84 (05/08/24 1000)  Resp: 17 (05/08/24 1000)  BP: 121/69 (05/08/24 1000)  SpO2: 100 % (05/08/24 1000) Vital Signs (24h Range):  Temp:  [96.8 °F (36 °C)-99.1 °F (37.3 °C)] 98.1 °F (36.7 °C)  Pulse:  [69-99] 84  Resp:  [] 17  SpO2:  [85 %-100 %] 100 %  BP: (107-139)/(55-83) 121/69     Weight: 69.8 kg (153 lb 14.1 oz)  Body mass index is 46.96 kg/m².    Estimated Creatinine Clearance: 6.1 mL/min (A) (based on SCr of 7.6 mg/dL (H)).     Physical Exam  Constitutional:       Appearance: She is ill-appearing.   HENT:      Head: Normocephalic and atraumatic.      Mouth/Throat:      Mouth: Mucous membranes are moist.   Eyes:      Extraocular Movements: Extraocular movements intact.      Conjunctiva/sclera: Conjunctivae normal.   Cardiovascular:      Rate and Rhythm: Normal rate and regular rhythm.      Heart sounds: Normal heart sounds.      Comments: Has left sided tunnel catheter placed   Pulmonary:      Effort: Pulmonary effort is normal. No respiratory distress.      Breath sounds: Normal breath sounds.   Abdominal:      General: Abdomen is flat. Bowel sounds are normal. There is no distension.      Palpations: Abdomen is soft.      Tenderness: There is no abdominal tenderness. There is no guarding.   Genitourinary:     Comments: Kerns placed  Musculoskeletal:      Comments: B/l AKA amputation, no increased erythema noted    Skin:     General: Skin is warm.   Neurological:      Mental Status: She is lethargic.      Comments: Oriented to self and able to follow some commands           Significant Labs: All pertinent labs within the past 24 hours have been reviewed.    Significant Imaging: I have reviewed all pertinent imaging results/findings within the past  24 hours.

## 2024-05-08 NOTE — PROGRESS NOTES
VANCOMYCIN DOSING BY PHARMACY DISCONTINUATION NOTE    Suyapa Connelly is a 58 y.o. female who had been consulted for vancomycin dosing.    The pharmacy consult for vancomycin dosing has been discontinued.     Vancomycin Dosing by Pharmacy Consult will sign-off. Please reconsult if necessary. Thank you for allowing us to participate in this patient's care.       Betzy Stephens, PharmD  Neurocritical Care Clinical Pharmacist  [phone]

## 2024-05-08 NOTE — ASSESSMENT & PLAN NOTE
-- Vascular neurology consulted. Appreciate assistance   -- CTH negative   -- Follow up MRI brain   -- Tox negative  -- Delirium precautions   -- Replace electrolytes to keep Mg > 2, Phos > 3, and K > 4  -- Nephrology consulted for ESRD management   -- RD consulted for TF recommendations  -- Further management per severe sepsis

## 2024-05-08 NOTE — ASSESSMENT & PLAN NOTE
Patient's FSGs are controlled on current medication regimen.  Last A1c reviewed-   Lab Results   Component Value Date    HGBA1C 8.0 (H) 04/24/2024     Most recent fingerstick glucose reviewed-   Recent Labs   Lab 05/07/24 2046   POCTGLUCOSE 151*     Current correctional scale  Low  Maintain anti-hyperglycemic dose as follows-   Antihyperglycemics (From admission, onward)      None          Hold Oral hypoglycemics while patient is in the hospital.

## 2024-05-08 NOTE — ASSESSMENT & PLAN NOTE
58 F h/o severe PVD s/p b/l LE AKA, ESRD on HD, IDDM2, CAD s/p CABG, Afib (on eliquis) who presented after being found unresponsive at home. She has 3 peripheral IV, LIJ HD catheter placed 1 year ago.   MRI brain without acute abnormality, simialry with CTA stroke.  Prior urine cx from 12/02 have grown proteus mirabilis (pan sensitive) and urine cx from 3/11 grew ESBL Klebsiella pneumoniae. AF, HR 70-80, -130/50-60s, on 30% FiO2/5.  Labs without leukocytosis WBC of 5.25 on admission, and 6 today. Lactic acid 1.3. UDS negative. UA 3+ leukocytes, >100 WBC., many bacteria and WBC clumping    Infectious Disease consulted for prior h/o ESBL (recurrent UTI) admitted for AMS and urosepsis, started on meropenem. Prior urine cx from 12/02/23  have grown proteus mirabilis (pan sensitive) amd urine cx from 3/11/22 grew ESBL Klebsiella pneumoniae. Worsening encephalopathy could be 2/2 UTI with concomitant failure to thrive as noted during prior hospitalizations. Low suspicion for CNS infection at this time.     Blood cultures from 5/7 with NGTD. Urine Cx from 5/7 with GNR, pending identification and susceptibility     Recommendations:   - fu urine cx GNR identification and susceptibility results  - continue meropenem until culture identification and susceptibility is complete, then will de-escalate as appropriate     Discussed with primary, will continue to follow up with patient.

## 2024-05-08 NOTE — PT/OT/SLP EVAL
Speech Language Pathology Evaluation  Bedside Swallow    Patient Name:  Suyapa Connelly   MRN:  [id number]  Admitting Diagnosis: Severe sepsis    Recommendations:                 General Recommendations:  Dysphagia therapy  Diet recommendations:  NPO, NPO   Aspiration Precautions:   Meds via alternative routes  Strict NPO- ongoing PO trials within speech sessions only   General Precautions: Standard, aspiration, fall  Communication strategies:  none    Assessment:     Suyapa Connelly is a 58 y.o. female with an SLP diagnosis of Dysphagia. She presents with decreased levels of alertness impacting participation in oral intake. Pt additionally exhibited no pharyngeal swallow triggering across low level PO trials this date. SLP to continue to follow.     History:     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    Osteomyelitis     Osteomyelitis of left foot 04/30/2021    Ulcer of left foot     Vaginal delivery     x1       Past Surgical History:   Procedure Laterality Date    ABOVE-KNEE AMPUTATION Left 5/18/2021    Procedure: AMPUTATION, ABOVE KNEE;  Surgeon: Teddy Huber MD;  Location: [facility] OR [address] [place] [address];  Service: Vascular;  Laterality: Left;    ABOVE-KNEE AMPUTATION Right 3/18/2022    Procedure: AMPUTATION, ABOVE KNEE;  Surgeon: DAYNE Florez II, MD;  Location: [address];  Service: Vascular;  Laterality: Right;    Angiogram - Right Extremity Right 7/9/15    angiogram-left leg  10/6/15    ANGIOGRAPHY OF LOWER EXTREMITY Left 4/29/2021    Procedure: ANGIOGRAM, LOWER EXTREMITY;  Surgeon: Teddy Huber MD;  Location: [address];  Service: Vascular;  Laterality: Left;    BELOW KNEE AMPUTATION OF LOWER EXTREMITY Right 12/28/2021     Procedure: AMPUTATION, BELOW KNEE;  Surgeon: Kaitlyn Rojas MD;  Location: [facility] OR;  Service: General;  Laterality: Right;    CATHETERIZATION OF BOTH LEFT AND RIGHT HEART N/A 12/18/2019    Procedure: CATHETERIZATION, HEART, BOTH LEFT AND RIGHT;  Surgeon: Que Fernando III, MD;  Location: [facility] CATH LAB;  Service: Cardiology;  Laterality: N/A;    CORONARY ANGIOGRAPHY N/A 12/18/2019    Procedure: ANGIOGRAM, CORONARY ARTERY;  Surgeon: Que Fernando III, MD;  Location: [facility] CATH LAB;  Service: Cardiology;  Laterality: N/A;    CORONARY ANGIOGRAPHY INCLUDING BYPASS GRAFTS WITH CATHETERIZATION OF LEFT HEART N/A 7/28/2020    Procedure: ANGIOGRAM, CORONARY, INCLUDING BYPASS GRAFT, WITH LEFT HEART CATHETERIZATION, 9 am;  Surgeon: Rachel Easley MD;  Location: [facility] CATH LAB;  Service: Cardiology;  Laterality: N/A;    CORONARY ARTERY BYPASS GRAFT (CABG) N/A 1/14/2020    Procedure: CORONARY ARTERY BYPASS GRAFT (CABG) x 1     Off Pump;  Surgeon: Hunag Altamirano MD;  Location: [address];  Service: Cardiovascular;  Laterality: N/A;    CREATION OF FEMORAL-TIBIAL ARTERY BYPASS Left 4/29/2021    Procedure: CREATION, BYPASS, ARTERIAL, FEMORAL TO ANTERIOR TIBIAL;  Surgeon: Teddy Huber MD;  Location: [address];  Service: Vascular;  Laterality: Left;    CREATION OF FEMOROPOPLITEAL ARTERIAL BYPASS USING GRAFT Left 8/18/2020    Procedure: CREATION, BYPASS, ARTERIAL, FEMORAL TO POPLITEAL, USING GRAFT, LEFT LOWER EXTREMITY;  Surgeon: Teddy Huber MD;  Location: [facility];  Service: Vascular;  Laterality: Left;  REQUEST 7:15 A.M. START----COVID NEGATIVE ON 8/17  1ST CASE STARTKENYA DUEÑAS ON 8/7/2020 @ 942AM-LO  RN PREOP 8/12/2020   T/S-----CLEARED BY CARDS-------PENDING INSURANCE    DEBRIDEMENT OF FOOT Left 9/8/2020    Procedure: DEBRIDEMENT, FOOT;  Surgeon: Rosio Mayes DPM;  Location: [facility];  Service: Podiatry;  Laterality: Left;  request neoxx .   RN Pre Op 9-4-2020, Covid negative on  9/5/20. C A    DEBRIDEMENT OF FOOT  3/4/2021    Procedure: DEBRIDEMENT, FOOT;  Surgeon: Teddy Huber MD;  Location: [facility] OR;  Service: Vascular;;    DEBRIDEMENT OF FOOT Left 3/9/2021    Procedure: DEBRIDEMENT, FOOT, bone biopsy;  Surgeon: Rosio Mayes DPM;  Location: [facility] OR;  Service: Podiatry;  Laterality: Left;  Request neoxx---COVID IN AM  REQUESTING NOON START  RN Phone Pre op.On Blood thinners Plavix and Eliquis.  Covid am of surgery. C A    DEBRIDEMENT OF FOOT Left 5/4/2021    Procedure: DEBRIDEMENT, FOOT;  Surgeon: Farooq Morley DPM;  [facility];  Service: Podiatry;  Laterality: Left;    INSERTION OF TUNNELED CENTRAL VENOUS HEMODIALYSIS CATHETER N/A 1/27/2020    Procedure: Insertion, Catheter, Central Venous, Hemodialysis;  Surgeon: ESTEBAN Gomez III, MD;  Location: [facility] CATH LAB;  Service: Peripheral Vascular;  Laterality: N/A;    INSERTION OF TUNNELED CENTRAL VENOUS HEMODIALYSIS CATHETER  5/10/2023    Procedure: Insertion, Catheter, Central Venous, Hemodialysis;  Surgeon: Romulo Queen MD;  Location: [facility] CATH LAB;  Service: Cardiology;;    PERCUTANEOUS TRANSLUMINAL ANGIOPLASTY N/A 3/4/2021    Procedure: PTA (ANGIOPLASTY, PERCUTANEOUS, TRANSLUMINAL);  Surgeon: Teddy Huber MD;  Location: [facility] OR;  Service: Vascular;  Laterality: N/A;    REMOVAL OF ARTERIOVENOUS GRAFT Left 5/27/2021    Procedure: REMOVAL, GRAFT, LEFT LOWER EXTREMITY, WOUND EXPLORATION;  Surgeon: Teddy Huber MD;  Location: [facility] 2ND FLR;  Service: Vascular;  Laterality: Left;    REMOVAL OF NAIL OF DIGIT Left 3/9/2021    Procedure: REMOVAL, NAIL, DIGIT;  Surgeon: Rosio Mayes DPM;  Location: [facility] OR;  Service: Podiatry;  Laterality: Left;    RIGHT HEART CATHETERIZATION Right 5/10/2023    Procedure: INSERTION, CATHETER, RIGHT HEART;  Surgeon: Romulo Queen MD;  Location: [facility] CATH LAB;  Service: Cardiology;  Laterality: Right;    THROMBECTOMY Left 3/4/2021    Procedure:  THROMBECTOMY, LEFT LOWER EXTREMITY BYPASS GRAFT, ANGIOGRAM, POSSIBLE INTERVENTION, POSSIBLE LEFT LOWER EXTREMITY BYPASS;  Surgeon: Teddy Huber MD;  Location: [facility] OR;  Service: Vascular;  Laterality: Left;    THROMBECTOMY Left 4/29/2021    Procedure: GRAFT THROMBECTOMY, LEFT LOWER EXTREMITY;  Surgeon: Teddy Huber MD;  Location: [facility] OR 2ND FLR;  Service: Vascular;  Laterality: Left;  14.5 min  1179.85 mGy  341.01 Gycm2  240 ml dye    THROMBECTOMY  10/22/2021    Procedure: THROMBECTOMY;  Surgeon: Saad Arenas MD;  Location: [facility] CATH LAB/EP;  Service: Cardiology;;       Social History: Unknown- pt unable to provide history    Prior Intubation HX:  5/7-5/8    Modified Barium Swallow: none on file    Chest X-Rays: 5/7/24- Patient has been intubated with the ET tube 3 cm above the mireya. NG tube is been placed with its tip and side port over the fundus of the stomach. Pre-existing tunneled dialysis catheter on the left is stable. Defibrillating pacing pads the patient's left side. Cardiac silhouette is decreased in size. No new infiltrate effusion or pneumothorax.     Prior diet: Unknown- pt unable to provide history    Occupation/hobbies/homemaking: Unknown- pt unable to provide history.    Subjective     Spoke with nursing prior to session. Pt found resting in bed upon SLP entry into room.     Patient goals: none stated     Pain/Comfort:  Pain Rating 1: 0/10    Respiratory Status: Room air    Objective:     Oral Musculature Evaluation  Oral Musculature: unable to assess due to poor participation/comprehension  Mucosal Quality: adequate  Voice Prior to PO Intake: hoarse with decreased intesnity on minimal vegetative phonation    Bedside Swallow Eval:   Consistencies Assessed:  Ice chips x2    Oral Phase:   Decreased closure around utensil  Dry mouth  Poor oral acceptance  Slow oral transit time    Pharyngeal Phase:   No true pharyngeal swallows achieved across 2/2 trials     Compensatory  Strategies  None    Treatment: Pt with significant levels of lethargy though able to sustain eyes opened and minimal acceptable levels of alertness for PO intake. Pt initially presenting with poor mouth opening though ultimately voluntarily parting lips for acceptance of PO trials. Further PO trials discontinued 2/2 high risk for aspiration and increased risk for development of aspiration-related complications. SLP attempted to engage pt in education regarding overall impressions and NPO status though pt drifting into light sleep state. Spoke with RN regarding impressions and recommendations and nursing verbalized understanding.      Goals:   Multidisciplinary Problems       SLP Goals          Problem: SLP    Goal Priority Disciplines Outcome   SLP Goal     SLP Progressing   Description: Speech Pathology Goals  To be met by 5/22/24    1. Pt will participate in ongoing diagnostic dysphagia therapy                               Plan:     Patient to be seen:  4 x/week   Plan of Care expires:  06/07/24  Plan of Care reviewed with:  patient   SLP Follow-Up:  Yes       Discharge recommendations:   (TBD)   Barriers to Discharge:  Level of Skilled Assistance Needed      Time Tracking:     SLP Treatment Date:   05/08/24  Speech Start Time:  1126  Speech Stop Time:  1132     Speech Total Time (min):  6 min    Billable Minutes: Eval Swallow and Oral Function 6      05/08/2024

## 2024-05-08 NOTE — ASSESSMENT & PLAN NOTE
-- Holding home sertraline and trazodone at this time given AMS. Will resume when clinically indicated

## 2024-05-09 LAB
ALBUMIN SERPL BCP-MCNC: 2.2 G/DL (ref 3.5–5.2)
ALP SERPL-CCNC: 129 U/L (ref 55–135)
ALT SERPL W/O P-5'-P-CCNC: 8 U/L (ref 10–44)
ANION GAP SERPL CALC-SCNC: 12 MMOL/L (ref 8–16)
APTT PPP: 36.7 SEC (ref 21–32)
APTT PPP: 41.9 SEC (ref 21–32)
AST SERPL-CCNC: 18 U/L (ref 10–40)
BASOPHILS # BLD AUTO: 0.05 K/UL (ref 0–0.2)
BASOPHILS NFR BLD: 0.7 % (ref 0–1.9)
BILIRUB SERPL-MCNC: 0.9 MG/DL (ref 0.1–1)
BUN SERPL-MCNC: 62 MG/DL (ref 6–20)
CALCIUM SERPL-MCNC: 9 MG/DL (ref 8.7–10.5)
CHLORIDE SERPL-SCNC: 104 MMOL/L (ref 95–110)
CO2 SERPL-SCNC: 24 MMOL/L (ref 23–29)
CREAT SERPL-MCNC: 7.9 MG/DL (ref 0.5–1.4)
DIFFERENTIAL METHOD BLD: ABNORMAL
EOSINOPHIL # BLD AUTO: 0.1 K/UL (ref 0–0.5)
EOSINOPHIL NFR BLD: 1.8 % (ref 0–8)
ERYTHROCYTE [DISTWIDTH] IN BLOOD BY AUTOMATED COUNT: 17.2 % (ref 11.5–14.5)
EST. GFR  (NO RACE VARIABLE): 5.5 ML/MIN/1.73 M^2
GLUCOSE SERPL-MCNC: 95 MG/DL (ref 70–110)
HCT VFR BLD AUTO: 32.7 % (ref 37–48.5)
HGB BLD-MCNC: 10.8 G/DL (ref 12–16)
IMM GRANULOCYTES # BLD AUTO: 0.02 K/UL (ref 0–0.04)
IMM GRANULOCYTES NFR BLD AUTO: 0.3 % (ref 0–0.5)
LYMPHOCYTES # BLD AUTO: 0.9 K/UL (ref 1–4.8)
LYMPHOCYTES NFR BLD: 13.2 % (ref 18–48)
MAGNESIUM SERPL-MCNC: 2.3 MG/DL (ref 1.6–2.6)
MCH RBC QN AUTO: 32 PG (ref 27–31)
MCHC RBC AUTO-ENTMCNC: 33 G/DL (ref 32–36)
MCV RBC AUTO: 97 FL (ref 82–98)
MONOCYTES # BLD AUTO: 0.8 K/UL (ref 0.3–1)
MONOCYTES NFR BLD: 11.5 % (ref 4–15)
NEUTROPHILS # BLD AUTO: 5.2 K/UL (ref 1.8–7.7)
NEUTROPHILS NFR BLD: 72.5 % (ref 38–73)
NRBC BLD-RTO: 0 /100 WBC
PHOSPHATE SERPL-MCNC: 5.7 MG/DL (ref 2.7–4.5)
PLATELET # BLD AUTO: 183 K/UL (ref 150–450)
PLATELET BLD QL SMEAR: ABNORMAL
PMV BLD AUTO: 11.9 FL (ref 9.2–12.9)
POCT GLUCOSE: 102 MG/DL (ref 70–110)
POCT GLUCOSE: 126 MG/DL (ref 70–110)
POCT GLUCOSE: 128 MG/DL (ref 70–110)
POCT GLUCOSE: 130 MG/DL (ref 70–110)
POCT GLUCOSE: 98 MG/DL (ref 70–110)
POTASSIUM SERPL-SCNC: 4.8 MMOL/L (ref 3.5–5.1)
PROT SERPL-MCNC: 7.4 G/DL (ref 6–8.4)
RBC # BLD AUTO: 3.38 M/UL (ref 4–5.4)
SODIUM SERPL-SCNC: 140 MMOL/L (ref 136–145)
WBC # BLD AUTO: 7.12 K/UL (ref 3.9–12.7)

## 2024-05-09 PROCEDURE — 25000003 PHARM REV CODE 250: Mod: HCNC | Performed by: NURSE PRACTITIONER

## 2024-05-09 PROCEDURE — 63600175 PHARM REV CODE 636 W HCPCS: Mod: HCNC | Performed by: INTERNAL MEDICINE

## 2024-05-09 PROCEDURE — 25000003 PHARM REV CODE 250: Mod: HCNC | Performed by: STUDENT IN AN ORGANIZED HEALTH CARE EDUCATION/TRAINING PROGRAM

## 2024-05-09 PROCEDURE — 94761 N-INVAS EAR/PLS OXIMETRY MLT: CPT | Mod: HCNC

## 2024-05-09 PROCEDURE — 92526 ORAL FUNCTION THERAPY: CPT | Mod: HCNC

## 2024-05-09 PROCEDURE — 97112 NEUROMUSCULAR REEDUCATION: CPT | Mod: HCNC

## 2024-05-09 PROCEDURE — 99233 SBSQ HOSP IP/OBS HIGH 50: CPT | Mod: HCNC,GC,, | Performed by: STUDENT IN AN ORGANIZED HEALTH CARE EDUCATION/TRAINING PROGRAM

## 2024-05-09 PROCEDURE — 97530 THERAPEUTIC ACTIVITIES: CPT | Mod: HCNC

## 2024-05-09 PROCEDURE — 84100 ASSAY OF PHOSPHORUS: CPT | Mod: HCNC | Performed by: INTERNAL MEDICINE

## 2024-05-09 PROCEDURE — 83735 ASSAY OF MAGNESIUM: CPT | Mod: HCNC | Performed by: INTERNAL MEDICINE

## 2024-05-09 PROCEDURE — 85025 COMPLETE CBC W/AUTO DIFF WBC: CPT | Mod: HCNC | Performed by: INTERNAL MEDICINE

## 2024-05-09 PROCEDURE — 25000003 PHARM REV CODE 250: Mod: HCNC | Performed by: INTERNAL MEDICINE

## 2024-05-09 PROCEDURE — 85730 THROMBOPLASTIN TIME PARTIAL: CPT | Mod: HCNC | Performed by: INTERNAL MEDICINE

## 2024-05-09 PROCEDURE — 97162 PT EVAL MOD COMPLEX 30 MIN: CPT | Mod: HCNC

## 2024-05-09 PROCEDURE — 63600175 PHARM REV CODE 636 W HCPCS: Mod: HCNC | Performed by: NURSE PRACTITIONER

## 2024-05-09 PROCEDURE — 97165 OT EVAL LOW COMPLEX 30 MIN: CPT | Mod: HCNC

## 2024-05-09 PROCEDURE — 99233 SBSQ HOSP IP/OBS HIGH 50: CPT | Mod: HCNC,,, | Performed by: INTERNAL MEDICINE

## 2024-05-09 PROCEDURE — 99233 SBSQ HOSP IP/OBS HIGH 50: CPT | Mod: HCNC,GC,, | Performed by: INTERNAL MEDICINE

## 2024-05-09 PROCEDURE — 80053 COMPREHEN METABOLIC PANEL: CPT | Mod: HCNC | Performed by: INTERNAL MEDICINE

## 2024-05-09 PROCEDURE — 11000001 HC ACUTE MED/SURG PRIVATE ROOM: Mod: HCNC

## 2024-05-09 RX ORDER — SERTRALINE HYDROCHLORIDE 50 MG/1
100 TABLET, FILM COATED ORAL DAILY
Status: DISCONTINUED | OUTPATIENT
Start: 2024-05-10 | End: 2024-05-09

## 2024-05-09 RX ORDER — SERTRALINE HYDROCHLORIDE 50 MG/1
100 TABLET, FILM COATED ORAL DAILY
Status: DISCONTINUED | OUTPATIENT
Start: 2024-05-09 | End: 2024-05-21 | Stop reason: HOSPADM

## 2024-05-09 RX ORDER — QUETIAPINE FUMARATE 25 MG/1
25 TABLET, FILM COATED ORAL DAILY PRN
Status: DISCONTINUED | OUTPATIENT
Start: 2024-05-09 | End: 2024-05-21 | Stop reason: HOSPADM

## 2024-05-09 RX ADMIN — MEROPENEM 500 MG: 500 INJECTION INTRAVENOUS at 01:05

## 2024-05-09 RX ADMIN — SERTRALINE HYDROCHLORIDE 100 MG: 50 TABLET ORAL at 06:05

## 2024-05-09 RX ADMIN — HEPARIN SODIUM 14 UNITS/KG/HR: 10000 INJECTION, SOLUTION INTRAVENOUS at 05:05

## 2024-05-09 RX ADMIN — HEPARIN SODIUM 14 UNITS/KG/HR: 10000 INJECTION, SOLUTION INTRAVENOUS at 08:05

## 2024-05-09 RX ADMIN — QUETIAPINE FUMARATE 25 MG: 25 TABLET ORAL at 06:05

## 2024-05-09 RX ADMIN — FAMOTIDINE 20 MG: 10 INJECTION, SOLUTION INTRAVENOUS at 08:05

## 2024-05-09 RX ADMIN — TRAZODONE HYDROCHLORIDE 25 MG: 50 TABLET ORAL at 10:05

## 2024-05-09 RX ADMIN — APIXABAN 5 MG: 5 TABLET, FILM COATED ORAL at 09:05

## 2024-05-09 RX ADMIN — MEROPENEM 500 MG: 500 INJECTION INTRAVENOUS at 12:05

## 2024-05-09 NOTE — PLAN OF CARE
MICU DAILY GOALS     Family/Goals of care/Code Status   Code Status: Full Code    24H Vital Sign Range  Temp:  [97.2 °F (36.2 °C)-98.4 °F (36.9 °C)]   Pulse:  [77-87]   Resp:  [12-28]   BP: ()/(51-91)   SpO2:  [95 %-100 %]      Shift Events (include procedures and significant events)   No acute events throughout shift  Heparin ggt continued per Nomogram, patient mentation waxes and wanes but is redirectable. Bathed and hair washed and styled per patient request. Unable to feed self lunch tray - RN fed patient puree lunch.   AWAKE RASS: Goal - RASS Goal: 0-->alert and calm  Actual - RASS (Christie Agitation-Sedation Scale): alert and calm    Restraint necessity: Not necessary   BREATHE SBT: Not intubated    Coordinate A & B, analgesics/sedatives Pain: managed   SAT: Not intubated   Delirium CAM-ICU: Overall CAM-ICU: Positive   Early(intubated/ Progressive (non-intubated) Mobility MOVE Screen (INTUBATED ONLY): Not intubated    Activity: Activity Management: Rolling - L1   Feeding/Nutrition Diet order: Diet/Nutrition Received: mechanical/dental soft,     Thrombus DVT prophylaxis: VTE Required Core Measure: Pharmacological prophylaxis initiated/maintained   HOB Elevation Head of Bed (HOB) Positioning: HOB at 45 degrees   Ulcer Prophylaxis GI: yes   Glucose control managed Glycemic Management: blood glucose monitored   Skin Skin assessed during: Daily Assessment    Sacrum intact/not altered? No  Heels intact/not altered? No heels  Surgical wound? No    [] Altered Skin Integrity Present or Discovered   [x] LDA present in EPIC, daily doc completed               Wound Care Consulted? Yes    4 EYES:  Attending Nurse (1st set of eyes): Leticia BRIGHT    Second RN/Staff Member (2nd set of eyes): Jamie PCT   Bowel Function no issues    Indwelling Catheter Necessity      Urethral Catheter 05/07/24-Reason for Continuing Urinary Catheterization: Critically ill in ICU and requiring hourly monitoring of intake/output          Hemodialysis Catheter 05/25/23 1336 left internal jugular-Line Necessity Review: CRRT/HD     De-escalation Antibiotics No        VS and assessment per flow sheet, patient progressing towards goals as tolerated, plan of care reviewed with patient, all concerns addressed, will continue to monitor.

## 2024-05-09 NOTE — PROGRESS NOTES
[facility] - Cardiac Medical ICU  Critical Care Medicine  Progress Note    Patient Name: Suyapa Connelly  MRN: [id number]  Admission Date: 5/7/2024  Hospital Length of Stay: 2 days  Code Status: Full Code  Attending Provider: Onofre Spann MD  Primary Care Provider: Magen Christensen MD   Principal Problem: Severe sepsis    Subjective:     HPI:  Ms. Suyapa Connelly is a 57 y/o F with hx of severe PVD ultimately requiring bilateral LE AKAs, ESRD on HD, IDDM2, CAD s/p CABG, Afib on eliquis, debility who initially presented to the ED via EMS after an episode of unresponsiveness at home. Patient was apparently at her mental baseline this morning, when she suddenly became unresponsive per . EMS was called and were unable to arouse patient. She was brought into the ED and intubated shortly after arrival. Of note, patient has had 6 different hospitalizations over the past year related to encephalopathy. Patient was most recently admitted 3/22-3/23/24 for evaluation of steady cognitive decline over the past year. It appears patient had declined over the past year to the extent that she is unable to perform independent ADLs.  not ready for NH placement as of yet.    In the ED, patient afebrile, SBP 120s, HR 80s, intubated at 40% FiO2 Peep 5. CBC grossly unchanged from baseline, no leukocytosis or worsening anemia. CMP without significant electrolyte abnormalities, BUN 59, BGL 130s. Lactic wnl. UA c/f infection with >100 wbc and many bacteria. UDS pan-negative. pH wnl and no hypercarbia on VBG. Patient admitted to MICU for continued management.     Hospital/ICU Course:  In the morning on reevaluation on SBT patient did well and she was extubated.  Nephrology consulted and HD completed.  ID consulted who recommended discontinuing IV Vancomycin and following up the urine culture results and just continuing with meropenem.  Reevaluated her known HFrEF with repeat Echo with similar EF of 15%.  SLP evaluated her  for dysphagia and currently advising still NPO.     Interval History/Significant Events: No desaturations overnight, still has a sore throat but notes improvement    Review of Systems   Constitutional:  Negative for fatigue and fever.   HENT:  Positive for sore throat.    Cardiovascular:  Negative for chest pain.   Gastrointestinal:  Negative for abdominal pain and nausea.   Genitourinary:  Negative for flank pain.   Musculoskeletal:  Negative for back pain.     Objective:     Vital Signs (Most Recent):  Temp: 98.1 °F (36.7 °C) (05/09/24 0900)  Pulse: 83 (05/09/24 0900)  Resp: 18 (05/09/24 0900)  BP: 129/60 (05/09/24 0900)  SpO2: 100 % (05/09/24 0900) Vital Signs (24h Range):  Temp:  [97.2 °F (36.2 °C)-98.8 °F (37.1 °C)] 98.1 °F (36.7 °C)  Pulse:  [78-86] 83  Resp:  [12-28] 18  SpO2:  [95 %-100 %] 100 %  BP: ()/(50-91) 129/60   Weight: 70.2 kg (154 lb 12.2 oz)  Body mass index is 47.23 kg/m².      Intake/Output Summary (Last 24 hours) at 5/9/2024 0955  Last data filed at 5/9/2024 0900  Gross per 24 hour   Intake 514.5 ml   Output 245 ml   Net 269.5 ml          Physical Exam  Vitals and nursing note reviewed.   Constitutional:       Appearance: She is well-developed.   HENT:      Head: Normocephalic.      Comments: Mucous membranes appear dry     Mouth/Throat:      Mouth: Mucous membranes are moist.   Eyes:      Pupils: Pupils are equal, round, and reactive to light.   Cardiovascular:      Rate and Rhythm: Normal rate and regular rhythm.      Pulses: Normal pulses.   Pulmonary:      Effort: Pulmonary effort is normal.   Abdominal:      General: Bowel sounds are normal. There is no distension.      Palpations: Abdomen is soft.      Tenderness: There is no abdominal tenderness.   Musculoskeletal:         General: No swelling.      Comments: BLAKA   Skin:     General: Skin is warm and dry.   Neurological:      Mental Status: She is alert.            Vents:  Vent Mode: Spont (05/08/24 0815)  Ventilator Initiated:  Yes (05/07/24 2114)  Set Rate: 18 BPM (05/08/24 0815)  Vt Set: 350 mL (05/08/24 0815)  Pressure Support: 10 cmH20 (05/08/24 0815)  PEEP/CPAP: 5 cmH20 (05/08/24 0815)  Oxygen Concentration (%): 30 (05/08/24 0900)  Peak Airway Pressure: 16 cmH20 (05/08/24 0815)  Plateau Pressure: 0 cmH20 (05/08/24 0815)  Total Ve: 7.4 L/m (05/08/24 0815)  Negative Inspiratory Force (cm H2O): 0 (05/08/24 0815)  F/VT Ratio<105 (RSBI): 114.68 (05/08/24 0815)  Lines/Drains/Airways       Central Venous Catheter Line  Duration                  Hemodialysis Catheter 05/25/23 1336 left internal jugular 349 days              Drain  Duration                  Urethral Catheter 05/07/24 2 days              Peripheral Intravenous Line  Duration                  Peripheral IV - Single Lumen 05/07/24 20 G Distal;Posterior;Right Forearm 2 days         Peripheral IV - Single Lumen 05/07/24 2100 20 G Anterior;Right Hand 1 day         Peripheral IV - Single Lumen 05/07/24 2106 20 G Anterior;Left Wrist 1 day                  Significant Labs:    CBC/Anemia Profile:  Recent Labs   Lab 05/07/24 2104 05/08/24  0130 05/09/24  0408   WBC 5.25 6.00 7.12   HGB 11.8* 10.9* 10.8*   HCT 35.5* 33.2* 32.7*    184 183   MCV 96 96 97   RDW 16.8* 16.9* 17.2*        Chemistries:  Recent Labs   Lab 05/07/24 2104 05/08/24  0600 05/09/24  0408    136 140   K 5.1 4.5 4.8    103 104   CO2 23 18* 24   BUN 59* 59* 62*   CREATININE 7.5* 7.6* 7.9*   CALCIUM 9.0 8.8 9.0   ALBUMIN 2.7* 2.2* 2.2*   PROT 8.5* 7.5 7.4   BILITOT 0.8 0.9 0.9   ALKPHOS 164* 139* 129   ALT 10 10 8*   AST 20 22 18   MG 2.2 2.2 2.3   PHOS  --  3.7 5.7*       Recent Lab Results  (Last 5 results in the past 24 hours)        05/09/24  0519   05/09/24  0408   05/09/24  0011   05/08/24  1726   05/08/24  1723        Albumin   2.2             ALP   129             ALT   8             Anion Gap   12             PTT   36.7  Comment: Refer to local heparin nomogram for intensity/dose specific    therapeutic   range.               AST   18             Baso #   0.05             Basophil %   0.7             BILIRUBIN TOTAL   0.9  Comment: For infants and newborns, interpretation of results should be based  on gestational age, weight and in agreement with clinical  observations.    Premature Infant recommended reference ranges:  Up to 24 hours.............<8.0 mg/dL  Up to 48 hours............<12.0 mg/dL  3-5 days..................<15.0 mg/dL  6-29 days.................<15.0 mg/dL               BUN   62             Calcium   9.0             Chloride   104             CO2   24             Creatinine   7.9             Differential Method   Automated             eGFR   5.5             Eos #   0.1             Eos %   1.8             Glucose   95             Gran # (ANC)   5.2             Gran %   72.5             Hematocrit   32.7             Hemoglobin   10.8             Immature Grans (Abs)   0.02  Comment: Mild elevation in immature granulocytes is non specific and   can be seen in a variety of conditions including stress response,   acute inflammation, trauma and pregnancy. Correlation with other   laboratory and clinical findings is essential.               Immature Granulocytes   0.3             Lymph #   0.9             Lymph %   13.2             Magnesium    2.3             MCH   32.0             MCHC   33.0             MCV   97             Mono #   0.8             Mono %   11.5             MPV   11.9             nRBC   0             Phosphorus Level   5.7             Platelet Estimate   Appears normal             Platelet Count   183             POCT Glucose 102     98   128         Potassium   4.8             PROTEIN TOTAL   7.4             RBC   3.38             RDW   17.2             Sodium   140             Troponin I         0.060  Comment: The reference interval for Troponin I represents the 99th percentile   cutoff   for our facility and is consistent with 3rd generation assay   performance.          WBC   7.12                                  All pertinent labs within the past 24 hours have been reviewed.    Significant Imaging:  I have reviewed all pertinent imaging results/findings within the past 24 hours.    ABG  Recent Labs   Lab 05/07/24  2124   PH 7.438   PO2 92*   PCO2 37.2   HCO3 25.1   BE 1     Assessment/Plan:     Neuro  Encephalopathy  -- Vascular neurology consulted. Appreciate assistance   -- CTH, MRI brain negative   -- Tox negative  -- Delirium precautions   -- Replace electrolytes to keep Mg > 2, Phos > 3, and K > 4  -- Nephrology HD MWF, dialyzed 5/8/24  -- NPO per SLP  -- Further management per severe sepsis     Psychiatric  CAROLIN (generalized anxiety disorder)  -- Holding home sertraline and trazodone at this time given AMS. Will resume when clinically indicated    Pulmonary  On mechanically assisted ventilation  Intubated in the setting of AMS, extubated successfully on 5/8/24.    -- GI and DVT ppx      Cardiac/Vascular  Chronic combined systolic and diastolic heart failure  -- Continue GDMT    Paroxysmal atrial fibrillation  [id number] Score: 3    6 High Risk:18.2% if no warfarin  5 High Risk: 12.5% if no warfarin  4 High Risk: 8.5% if no warfarin  3 High Risk: 5.9% if no warfarin  2 Intermediate Risk: 4% if no warfarin  1 Intermediate Risk: 2.8% if no warfarin    -- Continue home Coreg   -- Heparin gtt per nanogram while NPO  -- PT/INR   -- Trend CBCs  -- Continuous telemetry   -- EKG for acute changes   -- Keep Mg > 2, Phos > 3, and K > 4      CAD (coronary artery disease)  -- Continue Plavix + Statin therapy    Peripheral vascular disease  -- Extensive PVD ultimately requiring bilateral AKA  -- Continue home lipitor and plavix while admitted     Renal/  ESRD (end stage renal disease)  -- Nephrology consulted [facility] dialysis  -- Renally dose all medications   -- Avoid nephrotoxins   -- Kerns w strict I&O   -- MAP > 65   -- Hgb > 7    ID  * Severe sepsis  This patient does have evidence  of infective focus  My overall impression is sepsis.  Source: Urinary Tract    Antibiotics given-   Antibiotics (72h ago, onward)      Start     Stop Route Frequency Ordered    05/09/24 0100  meropenem (MERREM) 500 mg in sodium chloride 0.9 % 100 mL IVPB (MB+)         -- IV Every 12 hours (non-standard times) 05/08/24 1135          Latest lactate reviewed-  Recent Labs   Lab 05/07/24  2104 05/07/24  2125   LACTATE 1.3  --    POCLAC  --  1.42       Organ dysfunction indicated by Encephalopathy    Fluid challenge Contraindicated- Fluid bolus is contraindicated in this patient due to End Stage Renal Disease     Post- resuscitation assessment No - Post resuscitation assessment not needed     Will Not start Pressors- Levophed for MAP of 65  Source control achieved by:     -- Continue BSA   -- Follow up infectious work up: UCx with gram negative rods, susceptibilities pending   -- MAP > 65    Oncology  Anemia due to chronic kidney disease, on chronic dialysis  Recent Labs   Lab 05/09/24  0408   WBC 7.12   RBC 3.38*   HGB 10.8*   HCT 32.7*      MCV 97   MCH 32.0*   MCHC 33.0       -- Type and screen   -- CBC daily   -- Transfuse for hgb < 7    Endocrine  Moderate malnutrition  Nutrition consulted. Most recent weight and BMI monitored-     Measurements:  Wt Readings from Last 1 Encounters:   05/09/24 70.2 kg (154 lb 12.2 oz)   Body mass index is 47.23 kg/m².    Patient has been screened and assessed by RD.    Malnutrition Type:  Context: acute illness or injury  Level: moderate    Malnutrition Characteristic Summary:  Weight Loss (Malnutrition): 10% in 6 months  Subcutaneous Fat (Malnutrition): mild depletion  Muscle Mass (Malnutrition): mild depletion    Interventions/Recommendations (treatment strategy):  1.  Nutrition consulted, NPO per SLP   - F/u SLP recs     Type 2 diabetes mellitus with hyperglycemia, with long-term current use of insulin  Patient's FSGs are controlled on current medication regimen.  Last A1c  reviewed-   Lab Results   Component Value Date    HGBA1C 8.0 (H) 04/24/2024     Most recent fingerstick glucose reviewed-   Recent Labs   Lab 05/08/24  1726 05/09/24  0011 05/09/24  0519   POCTGLUCOSE 128* 98 102       Current correctional scale  Low  Maintain anti-hyperglycemic dose as follows-   Antihyperglycemics (From admission, onward)      Start     Stop Route Frequency Ordered    05/07/24 2347  insulin aspart U-100 pen 0-10 Units         -- SubQ Every 6 hours PRN 05/07/24 2247          Hold Oral hypoglycemics while patient is in the hospital.      Other  Impaired functional mobility and endurance  -- PT / OT when clinically appropriate   -- Has previously declined nursing placement        Critical Care Daily Checklist:    A: Awake: RASS Goal/Actual Goal: RASS Goal: 0-->alert and calm  Actual:     B: Spontaneous Breathing Trial Performed? Spon. Breathing Trial Initiated?: Initiated (05/08/24 0815)   C: SAT & SBT Coordinated?  NA                      D: Delirium: CAM-ICU Overall CAM-ICU: Negative   E: Early Mobility Performed? Yes   F: Feeding Goal: Goals: Meet % EEN, EPN by RD f/u date  Status: Nutrition Goal Status: new   Current Diet Order   Procedures    Diet NPO      AS: Analgesia/Sedation NA   T: Thromboembolic Prophylaxis Heparin    H: HOB > 300 Yes   U: Stress Ulcer Prophylaxis (if needed)  Yes   G: Glucose Control Yes   B: Bowel Function     I: Indwelling Catheter (Lines & Kerns) Necessity NA   D: De-escalation of Antimicrobials/Pharmacotherapies Yes DC vanc, continuing meropenem    Plan for the day/ETD SDU, SLP, PTOT    Code Status:  Family/Goals of Care: Full Code         Critical secondary to Patient has a condition that poses threat to life and bodily function: Severe Respiratory Distress      Critical care was time spent personally by me on the following activities: development of treatment plan with patient or surrogate and bedside caregivers, discussions with consultants, evaluation of  patient's response to treatment, examination of patient, ordering and performing treatments and interventions, ordering and review of laboratory studies, ordering and review of radiographic studies, pulse oximetry, re-evaluation of patient's condition. This critical care time did not overlap with that of any other provider or involve time for any procedures.     Naida Hernandez MD  Critical Care Medicine  [facility] - Cardiac Medical [facility]

## 2024-05-09 NOTE — ASSESSMENT & PLAN NOTE
ESRD on iHD MWF  Dr. Jesika MATA TDC  + residual renal fx    Missed HD session on Monday prior to presentation.    Plan/Recommendations:  -Will plan for HD today for metabolic clearance/volume management  -UF 1-2L as tolerated  -strict I/O's  -Renal diet when advanced  -phos WNL, will monitor for now.  No need for phos binders at this time  -Hgb @ goal for ESRD, continue trending.  No need for NAEEM therapy.

## 2024-05-09 NOTE — SUBJECTIVE & OBJECTIVE
Interval History:   NAEON.  Unable to receive HD overnight due to emergent treatments.  More alert on exam.  Labs non-emergent.  Oxygenating well on RA.      Review of patient's allergies indicates:   Allergen Reactions    Ciprofloxacin Itching    Contrast media      Kidney injury    Iodine      Kidney injury    Pcn [penicillins]      Rash; tolerated ceftriaxone on 1/13/20     Current Facility-Administered Medications   Medication Frequency    dextrose 10% bolus 125 mL 125 mL PRN    dextrose 10% bolus 250 mL 250 mL PRN    famotidine (PF) injection 20 mg Daily    fentaNYL 50 mcg/mL injection 50 mcg Q1H PRN    glucagon (human recombinant) injection 1 mg PRN    heparin 25,000 units in dextrose 5% (100 units/ml) IV bolus from bag LOW INTENSITY nomogram - [facility] PRN    heparin 25,000 units in dextrose 5% (100 units/ml) IV bolus from bag LOW INTENSITY nomogram - [facility] PRN    heparin 25,000 units in dextrose 5% 250 mL (100 units/mL) infusion LOW INTENSITY nomogram - [facility] Continuous    insulin aspart U-100 pen 0-10 Units Q6H PRN    meropenem (MERREM) 500 mg in sodium chloride 0.9 % 100 mL IVPB (MB+) Q12H    sodium chloride 0.9% flush 10 mL PRN       Objective:     Vital Signs (Most Recent):  Temp: 98.1 °F (36.7 °C) (05/09/24 0900)  Pulse: 83 (05/09/24 0900)  Resp: 18 (05/09/24 0900)  BP: 129/60 (05/09/24 0900)  SpO2: 100 % (05/09/24 0900) Vital Signs (24h Range):  Temp:  [97.2 °F (36.2 °C)-98.8 °F (37.1 °C)] 98.1 °F (36.7 °C)  Pulse:  [78-86] 83  Resp:  [12-28] 18  SpO2:  [95 %-100 %] 100 %  BP: ()/(50-91) 129/60     Weight: 70.2 kg (154 lb 12.2 oz) (05/09/24 0400)  Body mass index is 47.23 kg/m².  Body surface area is 1.54 meters squared.    I/O last 3 completed shifts:  In: 857.3 [I.V.:368.4; IV Piggyback:488.9]  Out: 655 [Urine:655]     Physical Exam  Vitals and nursing note reviewed.   Constitutional:       General: She is not in acute distress.     Appearance: She is not ill-appearing.   HENT:      Head: Normocephalic  and atraumatic.      Mouth/Throat:      Mouth: Mucous membranes are moist.      Pharynx: No oropharyngeal exudate or posterior oropharyngeal erythema.   Eyes:      General: No scleral icterus.        Right eye: No discharge.         Left eye: No discharge.      Extraocular Movements: Extraocular movements intact.      Conjunctiva/sclera: Conjunctivae normal.   Cardiovascular:      Rate and Rhythm: Normal rate and regular rhythm.      Heart sounds: No murmur heard.     No friction rub. No gallop.   Pulmonary:      Effort: Pulmonary effort is normal. No respiratory distress.      Breath sounds: Normal breath sounds. No wheezing or rales.   Abdominal:      General: Bowel sounds are normal. There is no distension.      Palpations: Abdomen is soft.      Tenderness: There is no abdominal tenderness.   Musculoskeletal:         General: Deformity (bilateral AKA) present.      Cervical back: Normal range of motion and neck supple.   Skin:     General: Skin is warm and dry.   Neurological:      General: No focal deficit present.      Mental Status: She is alert and oriented to person, place, and time.      Comments: alert          Significant Labs:  CBC:   Recent Labs   Lab 05/09/24  0408   WBC 7.12   RBC 3.38*   HGB 10.8*   HCT 32.7*      MCV 97   MCH 32.0*   MCHC 33.0     CMP:   Recent Labs   Lab 05/09/24  0408   GLU 95   CALCIUM 9.0   ALBUMIN 2.2*   PROT 7.4      K 4.8   CO2 24      BUN 62*   CREATININE 7.9*   ALKPHOS 129   ALT 8*   AST 18   BILITOT 0.9

## 2024-05-09 NOTE — PLAN OF CARE
MICU DAILY GOALS     Family/Goals of care/Code Status   Code Status: Full Code    24H Vital Sign Range  Temp:  [96.8 °F (36 °C)-98.8 °F (37.1 °C)]   Pulse:  [76-86]   Resp:  []   BP: ()/(50-73)   SpO2:  [95 %-100 %]      Shift Events (include procedures and significant events)   No acute events throughout shift    AWAKE RASS: Goal - RASS Goal: 0-->alert and calm  Actual - RASS (Christie Agitation-Sedation Scale): alert and calm    Restraint necessity: Not necessary   BREATHE SBT: Not intubated    Coordinate A & B, analgesics/sedatives Pain: managed   SAT: Not intubated   Delirium CAM-ICU: Overall CAM-ICU: Positive   Early(intubated/ Progressive (non-intubated) Mobility MOVE Screen (INTUBATED ONLY): Not intubated    Activity: Activity Management: Arm raise - L1   Feeding/Nutrition Diet order: Diet/Nutrition Received: NPO,     Thrombus DVT prophylaxis: VTE Required Core Measure: Pharmacological prophylaxis initiated/maintained   HOB Elevation Head of Bed (HOB) Positioning: HOB at 30 degrees   Ulcer Prophylaxis GI: yes   Glucose control managed Glycemic Management: blood glucose monitored   Skin Skin assessed during: Q Shift Change    Sacrum intact/not altered? No  Heels intact/not altered? Yes  Surgical wound? No    CHECK ONE!   (no altered skin or altered skin) and sub boxes:  [] No Altered Skin Integrity Present    []Prevention Measures Documented    [x] Altered Skin Integrity Present or Discovered   [x] LDA present in EPIC, daily doc completed              [] LDA added if not in EPIC (describe wound).                    When describing wound, do not stage, use descriptive words only.    [] Wound Image Taken (required on admit,                   transfer/discharge and every Tuesday)    Wound Care Consulted? Yes    4 EYES:  Attending Nurse (1st set of eyes): DEANGELO Pascal    Second RN/Staff Member (2nd set of eyes): DEANGELO Araujo   Bowel Function no issues    Indwelling Catheter Necessity      Urethral  Catheter 05/07/24-Reason for Continuing Urinary Catheterization: Critically ill in ICU and requiring hourly monitoring of intake/output         Hemodialysis Catheter 05/25/23 1336 left internal jugular-Line Necessity Review: CRRT/HD     De-escalation Antibiotics Yes        VS and assessment per flow sheet, patient progressing towards goals as tolerated, plan of care reviewed with  Suyapa and Randell Connelly , all concerns addressed, will continue to monitor.

## 2024-05-09 NOTE — PROGRESS NOTES
[facility]  Nephrology  Progress Note    Patient Name: Suyapa Connelly  MRN: [id number]  Admission Date: 5/7/2024  Hospital Length of Stay: 2 days  Attending Provider: Onofre Spann MD   Primary Care Physician: Magen Christensen MD  Principal Problem:Severe sepsis    Subjective:     HPI: Ms. Suyapa Connelly is a 57 y/o F with hx of PVD requiring bilateral LE AKAs, ESRD on HD (MWF), IDDM2, CAD s/p CABG, Afib on eliquis, debility who initially presented to the ED via EMS after an episode of unresponsiveness at home. Patient was apparently at her mental baseline this morning, when she suddenly became unresponsive per . EMS was called and were unable to arouse patient. She was brought into the ED and intubated shortly after arrival. Of note, patient has had 6 different hospitalizations over the past year related to encephalopathy. Patient was most recently admitted 3/22-3/23/24 for evaluation of steady cognitive decline over the past year. It appears patient had declined over the past year to the extent that she is unable to perform independent ADLs.  not ready for NH placement as of yet.  Information was obtained from chart review and .  Of note, patient missed her HD session on Monday prior to presentation for unknown reason.    Interval History:   NAEON.  Unable to receive HD overnight due to emergent treatments.  More alert on exam.  Labs non-emergent.  Oxygenating well on RA.      Review of patient's allergies indicates:   Allergen Reactions    Ciprofloxacin Itching    Contrast media      Kidney injury    Iodine      Kidney injury    Pcn [penicillins]      Rash; tolerated ceftriaxone on 1/13/20     Current Facility-Administered Medications   Medication Frequency    dextrose 10% bolus 125 mL 125 mL PRN    dextrose 10% bolus 250 mL 250 mL PRN    famotidine (PF) injection 20 mg Daily    fentaNYL 50 mcg/mL injection 50 mcg Q1H PRN    glucagon (human recombinant) injection  1 mg PRN    heparin 25,000 units in dextrose 5% (100 units/ml) IV bolus from bag LOW INTENSITY nomogram - [facility] PRN    heparin 25,000 units in dextrose 5% (100 units/ml) IV bolus from bag LOW INTENSITY nomogram - [facility] PRN    heparin 25,000 units in dextrose 5% 250 mL (100 units/mL) infusion LOW INTENSITY nomogram - [facility] Continuous    insulin aspart U-100 pen 0-10 Units Q6H PRN    meropenem (MERREM) 500 mg in sodium chloride 0.9 % 100 mL IVPB (MB+) Q12H    sodium chloride 0.9% flush 10 mL PRN       Objective:     Vital Signs (Most Recent):  Temp: 98.1 °F (36.7 °C) (05/09/24 0900)  Pulse: 83 (05/09/24 0900)  Resp: 18 (05/09/24 0900)  BP: 129/60 (05/09/24 0900)  SpO2: 100 % (05/09/24 0900) Vital Signs (24h Range):  Temp:  [97.2 °F (36.2 °C)-98.8 °F (37.1 °C)] 98.1 °F (36.7 °C)  Pulse:  [78-86] 83  Resp:  [12-28] 18  SpO2:  [95 %-100 %] 100 %  BP: ()/(50-91) 129/60     Weight: 70.2 kg (154 lb 12.2 oz) (05/09/24 0400)  Body mass index is 47.23 kg/m².  Body surface area is 1.54 meters squared.    I/O last 3 completed shifts:  In: 857.3 [I.V.:368.4; IV Piggyback:488.9]  Out: 655 [Urine:655]     Physical Exam  Vitals and nursing note reviewed.   Constitutional:       General: She is not in acute distress.     Appearance: She is not ill-appearing.   HENT:      Head: Normocephalic and atraumatic.      Mouth/Throat:      Mouth: Mucous membranes are moist.      Pharynx: No oropharyngeal exudate or posterior oropharyngeal erythema.   Eyes:      General: No scleral icterus.        Right eye: No discharge.         Left eye: No discharge.      Extraocular Movements: Extraocular movements intact.      Conjunctiva/sclera: Conjunctivae normal.   Cardiovascular:      Rate and Rhythm: Normal rate and regular rhythm.      Heart sounds: No murmur heard.     No friction rub. No gallop.   Pulmonary:      Effort: Pulmonary effort is normal. No respiratory distress.      Breath sounds: Normal breath sounds. No wheezing or rales.    Abdominal:      General: Bowel sounds are normal. There is no distension.      Palpations: Abdomen is soft.      Tenderness: There is no abdominal tenderness.   Musculoskeletal:         General: Deformity (bilateral AKA) present.      Cervical back: Normal range of motion and neck supple.   Skin:     General: Skin is warm and dry.   Neurological:      General: No focal deficit present.      Mental Status: She is alert and oriented to person, place, and time.      Comments: alert          Significant Labs:  CBC:   Recent Labs   Lab 05/09/24  0408   WBC 7.12   RBC 3.38*   HGB 10.8*   HCT 32.7*      MCV 97   MCH 32.0*   MCHC 33.0     CMP:   Recent Labs   Lab 05/09/24  0408   GLU 95   CALCIUM 9.0   ALBUMIN 2.2*   PROT 7.4      K 4.8   CO2 24      BUN 62*   CREATININE 7.9*   ALKPHOS 129   ALT 8*   AST 18   BILITOT 0.9      Assessment/Plan:     Renal/  ESRD (end stage renal disease)  ESRD on iHD MWF  Dr. Jesika MATA TDC  + residual renal fx    Missed HD session on Monday prior to presentation.    Plan/Recommendations:  -Will plan for HD today for metabolic clearance/volume management  -UF 1-2L as tolerated  -strict I/O's  -Renal diet when advanced  -phos WNL, will monitor for now.  No need for phos binders at this time  -Hgb @ goal for ESRD, continue trending.  No need for NAEEM therapy.    Oncology  Anemia due to chronic kidney disease, on chronic dialysis  @ goal  -continue trending      Agustin Vang, NP  Nephrology  [facility]

## 2024-05-09 NOTE — PT/OT/SLP EVAL
"Physical Therapy Evaluation    Patient Name:  Suyapa Connelly   MRN:  [id number]  Admit Date: 5/7/2024  Admitting Diagnosis:  Severe sepsis  Length of Stay: 2 days  Recent Surgery: * No surgery found *      Recommendations:     Discharge Recommendations:  Low Intensity Therapy   Discharge Equipment Recommendations: bedside commode       Assessment:     Suyapa Connelly is a 58 y.o. female admitted with a medical diagnosis of Severe sepsis. Pt found lethargic and confused. P with chronic B AKAs. Pt demo'd difficulty purposefully participating in session due to impaired cognition. Pt able to sit EOB with 2 person assistance and demo'd poor sitting balance. Pt able to initiate lateral scooting but unable to perform. At baseline, pt with declining cognition and declining mobility. Per chart review,  was assisting patient to WC with total A or using a jany lift. Will continue to work on bed mobility, sitting balance, and scooting.      Problem List: weakness, impaired endurance, impaired functional mobility, impaired balance, impaired cognition, decreased upper extremity function, decreased lower extremity function  Rehab Prognosis: Fair; patient would benefit from acute skilled PT services to address these deficits and reach maximum level of function.      Plan:     During this hospitalization, patient to be seen 2 x/week to address the identified rehab impairments via therapeutic activities, therapeutic exercises, neuromuscular re-education and progress towards the established goals.    Plan of Care Expires:  06/08/24    Subjective   Communicated with RN prior to session.  Patient found HOB elevated upon PT entry to room, agreeable to evaluation. Suyapa Connelly's alone during session.    Chief Complaint: Altered Mental Status (Per spouse pt went "unresponsive" x 1 hour ago. Pt continues to be unresponsive at triage. Able to maintain airway at this time. Glucose 151)    Patient/Family Comments/goals: unable " to assess 2nd to impaired cognition   Pain/Comfort:  Pain Rating 1: 0/10  Pain Rating Post-Intervention 1: 0/10    Pt unable to give social hx due to impaired cognition. Social hx from prior PT reyna on 3/23/2024    Social History:  Residence: Patient lives with their spouse in a single story house with number of outside stair(s): 0 .  Equipment Owned (not using): bath bench, wheelchair, lift device, grab bar  Equipment Used:  grab bar, bath bench, wheelchair, and lift device  Prior level of function:  Prior to admission, patient completed transfers via scoot transfers to wheelchair with total assistance using assist from  . She has required increasing assist to perform ADLs. Pt reports she has required increasing assistance over the last year and used to be able to transfer self to/from wheelchair. Pt utilizes a jany lift for dialysis  Managing Medicines/Managing Home: no.   Assistance Upon Discharge: significant other    Objective:   Patient found with: telemetry, pulse ox (continuous), blood pressure cuff, central line, peripheral IV, snow catheter     General Precautions: Standard, Cardiac fall   Orthopedic Precautions:N/A   Braces: N/A   Oxygen Device: Room Air  Vitals: /66 (BP Location: Right arm, Patient Position: Lying)   Pulse 87   Temp 97.9 °F (36.6 °C)   Resp 14   Ht 4' (1.219 m)   Wt 70.2 kg (154 lb 12.2 oz)   LMP 09/30/2015 (Exact Date)   SpO2 100%   Breastfeeding No   BMI 47.23 kg/m²     Exams:  Cognition:   Lethargic, Confused, Cooperative  Oriented to name and place only   Command following: follow one step commands inconsistently   Fluency: clear/fluent    B LE AKAs   ROM WFL      Outcome Measures:  AM-PAC 6 CLICK MOBILITY  Turning over in bed (including adjusting bedclothes, sheets and blankets)?: 2  Sitting down on and standing up from a chair with arms (e.g., wheelchair, bedside commode, etc.): 1  Moving from lying on back to sitting on the side of the bed?: 2  Moving to  and from a bed to a chair (including a wheelchair)?: 1  Need to walk in hospital room?: 1  Climbing 3-5 steps with a railing?: 1  Basic Mobility Total Score: 8     Functional Mobility:  Additional staff present: OT  Bed Mobility:  Lateral Scooting to right: total assistance  Pt elsy'no good initiation but unable to perform mobility   Supine to Sit: total assistance and 2 persons; HOB elevated  Scooting anteriorly to EOB to have both feet planted on floor: total assistance and 2 persons  Sit to Supine: total assistance and 2 persons; HOB flat    Sitting Balance at Edge of Bed:  Assistance Level Required: Total Assistance  Time: 5 minutes  Comments:   Worked on activity tolerance sitting EOB, worked on tolerance to positional change, and worked on sitting balance   Pt elsy'no posterior L lean. Pt was unable to correct without facilitation to midline   Pt performed ADLs with OT    Transfers:   Not performed. Pt with B AKAs and does not stand at baseline       Gait:   Not performed     Therapeutic Activities, Exercises, & Education:   Educated pt on PT role/POC  Provided daily orientation  Pt verbalized understanding       Patient left HOB elevated with all lines intact, call button in reach, and RN present.    GOALS:   Multidisciplinary Problems       Physical Therapy Goals          Problem: Physical Therapy    Goal Priority Disciplines Outcome Goal Variances Interventions   Physical Therapy Goal     PT, PT/OT Progressing     Description: Goals to be met by: 2024    Patient will increase functional independence with mobility by performin. Supine to sit with Stand-by Assistance  2. Sit to supine with Stand-by Assistance  3. Rolling to Left and Right with Stand-by Assistance  4. Lateral Scooting to Left and Right with Stand-by Assistance                         History:     Past Medical History:   Diagnosis Date    Anxiety     Chronic pain syndrome     CKD (chronic kidney disease), stage III     Depression      Diabetes mellitus, type 2     GERD (gastroesophageal reflux disease)     Hyperemesis 03/23/2021    Hypokalemia 03/23/2021    Infection of below knee amputation stump 03/12/2022    Osteomyelitis     Osteomyelitis of left foot 04/30/2021    Ulcer of left foot     Vaginal delivery     x1       Past Surgical History:   Procedure Laterality Date    ABOVE-KNEE AMPUTATION Left 5/18/2021    Procedure: AMPUTATION, ABOVE KNEE;  Surgeon: Teddy Huber MD;  Location: [facility] OR [address] [place] [address];  Service: Vascular;  Laterality: Left;    ABOVE-KNEE AMPUTATION Right 3/18/2022    Procedure: AMPUTATION, ABOVE KNEE;  Surgeon: DAYNE Florez II, MD;  [facility];  Service: Vascular;  Laterality: Right;    Angiogram - Right Extremity Right 7/9/15    angiogram-left leg  10/6/15    ANGIOGRAPHY OF LOWER EXTREMITY Left 4/29/2021    Procedure: ANGIOGRAM, LOWER EXTREMITY;  Surgeon: Teddy Huber MD;  [facility];  Service: Vascular;  Laterality: Left;    BELOW KNEE AMPUTATION OF LOWER EXTREMITY Right 12/28/2021    Procedure: AMPUTATION, BELOW KNEE;  Surgeon: Kaitlyn Rojas MD;  Location: [facility] OR;  Service: General;  Laterality: Right;    CATHETERIZATION OF BOTH LEFT AND RIGHT HEART N/A 12/18/2019    Procedure: CATHETERIZATION, HEART, BOTH LEFT AND RIGHT;  Surgeon: Que Fernando III, MD;  Location: [facility] CATH LAB;  Service: Cardiology;  Laterality: N/A;    CORONARY ANGIOGRAPHY N/A 12/18/2019    Procedure: ANGIOGRAM, CORONARY ARTERY;  Surgeon: Que Fernando III, MD;  Location: [facility] CATH LAB;  Service: Cardiology;  Laterality: N/A;    CORONARY ANGIOGRAPHY INCLUDING BYPASS GRAFTS WITH CATHETERIZATION OF LEFT HEART N/A 7/28/2020    Procedure: ANGIOGRAM, CORONARY, INCLUDING BYPASS GRAFT, WITH LEFT HEART CATHETERIZATION, 9 am;  Surgeon: Rachel Easley MD;  Location: [facility] CATH LAB;  Service: Cardiology;  Laterality: N/A;    CORONARY ARTERY BYPASS GRAFT (CABG) N/A 1/14/2020    Procedure: CORONARY  ARTERY BYPASS GRAFT (CABG) x 1     Off Pump;  Surgeon: Huang Altamirano MD;  Location: [address];  Service: Cardiovascular;  Laterality: N/A;    CREATION OF FEMORAL-TIBIAL ARTERY BYPASS Left 4/29/2021    Procedure: CREATION, BYPASS, ARTERIAL, FEMORAL TO ANTERIOR TIBIAL;  Surgeon: Teddy Huber MD;  Location: [address];  Service: Vascular;  Laterality: Left;    CREATION OF FEMOROPOPLITEAL ARTERIAL BYPASS USING GRAFT Left 8/18/2020    Procedure: CREATION, BYPASS, ARTERIAL, FEMORAL TO POPLITEAL, USING GRAFT, LEFT LOWER EXTREMITY;  Surgeon: Teddy Huber MD;  Location: [facility];  Service: Vascular;  Laterality: Left;  REQUEST 7:15 A.M. START----COVID NEGATIVE ON 8/17 1ST CASE STARTE PER LEANA ON 8/7/2020 @ 942AM-  RN PREOP 8/12/2020   T/S-----CLEARED BY CARDS-------PENDING INSURANCE    DEBRIDEMENT OF FOOT Left 9/8/2020    Procedure: DEBRIDEMENT, FOOT;  Surgeon: Rosio Mayes DPM;  Location: [facility];  Service: Podiatry;  Laterality: Left;  request neoxx .   RN Pre Op 9-4-2020, Covid negative on 9/5/20. C A    DEBRIDEMENT OF FOOT  3/4/2021    Procedure: DEBRIDEMENT, FOOT;  Surgeon: Teddy Huber MD;  Location: [facility];  Service: Vascular;;    DEBRIDEMENT OF FOOT Left 3/9/2021    Procedure: DEBRIDEMENT, FOOT, bone biopsy;  Surgeon: Rosio Mayes DPM;  Location: [facility] OR;  Service: Podiatry;  Laterality: Left;  Request neoxx---COVID IN AM  REQUESTING NOON START  RN Phone Pre op.On Blood thinners Plavix and Eliquis.  Covid am of surgery. C A    DEBRIDEMENT OF FOOT Left 5/4/2021    Procedure: DEBRIDEMENT, FOOT;  Surgeon: Farooq Morley DPM;  Location: [address];  Service: Podiatry;  Laterality: Left;    INSERTION OF TUNNELED CENTRAL VENOUS HEMODIALYSIS CATHETER N/A 1/27/2020    Procedure: Insertion, Catheter, Central Venous, Hemodialysis;  Surgeon: ESTEBAN Gomez III, MD;  Location: [facility] CATH LAB;  Service: Peripheral Vascular;  Laterality: N/A;    INSERTION OF TUNNELED  CENTRAL VENOUS HEMODIALYSIS CATHETER  5/10/2023    Procedure: Insertion, Catheter, Central Venous, Hemodialysis;  Surgeon: Romulo Queen MD;  Location: [facility] CATH LAB;  Service: Cardiology;;    PERCUTANEOUS TRANSLUMINAL ANGIOPLASTY N/A 3/4/2021    Procedure: PTA (ANGIOPLASTY, PERCUTANEOUS, TRANSLUMINAL);  Surgeon: Teddy Huber MD;  Location: [facility] OR;  Service: Vascular;  Laterality: N/A;    REMOVAL OF ARTERIOVENOUS GRAFT Left 5/27/2021    Procedure: REMOVAL, GRAFT, LEFT LOWER EXTREMITY, WOUND EXPLORATION;  Surgeon: Teddy Huber MD;  Location: [facility] OR 2ND FLR;  Service: Vascular;  Laterality: Left;    REMOVAL OF NAIL OF DIGIT Left 3/9/2021    Procedure: REMOVAL, NAIL, DIGIT;  Surgeon: Rosio Mayes DPM;  Location: [facility] OR;  Service: Podiatry;  Laterality: Left;    RIGHT HEART CATHETERIZATION Right 5/10/2023    Procedure: INSERTION, CATHETER, RIGHT HEART;  Surgeon: Romulo Queen MD;  Location: [facility] CATH LAB;  Service: Cardiology;  Laterality: Right;    THROMBECTOMY Left 3/4/2021    Procedure: THROMBECTOMY, LEFT LOWER EXTREMITY BYPASS GRAFT, ANGIOGRAM, POSSIBLE INTERVENTION, POSSIBLE LEFT LOWER EXTREMITY BYPASS;  Surgeon: Teddy Huber MD;  Location: [facility] OR;  Service: Vascular;  Laterality: Left;    THROMBECTOMY Left 4/29/2021    Procedure: GRAFT THROMBECTOMY, LEFT LOWER EXTREMITY;  Surgeon: Teddy Huber MD;  Location: [facility] OR 2ND FLR;  Service: Vascular;  Laterality: Left;  14.5 min  1179.85 mGy  341.01 Gycm2  240 ml dye    THROMBECTOMY  10/22/2021    Procedure: THROMBECTOMY;  Surgeon: Saad Arenas MD;  Location: [facility] CATH LAB/EP;  Service: Cardiology;;       Time Tracking:     PT Received On: 05/09/24  PT Start Time: 0824     PT Stop Time: 0841  PT Total Time (min): 17 min     Billable Minutes: Evaluation 5 and Therapeutic Activity 8

## 2024-05-09 NOTE — PROGRESS NOTES
[facility] - Cardiac Medical ICU  Infectious Disease  Progress Note    Patient Name: Suyapa Connelly  MRN: [id number]  Admission Date: 5/7/2024  Length of Stay: 2 days  Attending Physician: Onofre Spann MD  Primary Care Provider: Magen Christensen MD    Isolation Status: No active isolations  Assessment/Plan:      Neuro  Encephalopathy  58 F h/o severe PVD s/p b/l LE AKA, ESRD on HD, IDDM2, CAD s/p CABG, Afib (on eliquis) who presented after being found unresponsive at home. She has 3 peripheral IV, LIJ HD catheter placed 1 year ago.   MRI brain without acute abnormality, simialry with CTA stroke.  Prior urine cx from 12/02 have grown proteus mirabilis (pan sensitive) and urine cx from 3/11 grew ESBL Klebsiella pneumoniae. AF, HR 70-80, -130/50-60s, on 30% FiO2/5.  Labs without leukocytosis WBC of 5.25 on admission, and 6 today. Lactic acid 1.3. UDS negative. UA 3+ leukocytes, >100 WBC., many bacteria and WBC clumping    Infectious Disease consulted for prior h/o ESBL (recurrent UTI) admitted for AMS and urosepsis, started on meropenem. Prior urine cx from 12/02/23  have grown proteus mirabilis (pan sensitive) amd urine cx from 3/11/22 grew ESBL Klebsiella pneumoniae. Worsening encephalopathy could be 2/2 UTI with concomitant failure to thrive as noted during prior hospitalizations. Low suspicion for CNS infection at this time.     Blood cultures from 5/7 with NGTD. Urine Cx from 5/7 with GNR, pending identification and susceptibility     Recommendations:   - fu urine cx GNR identification and susceptibility results  - continue meropenem until culture identification and susceptibility is complete, then will de-escalate as appropriate     Discussed with primary, will continue to follow up with patient.       Thank you for your consult. I will follow-up with patient. Please contact us if you have any additional questions.    Seema Dye MD  Infectious Disease  [facility] - Cardiac [facility]  ICU    Subjective:     Principal Problem:Severe sepsis    HPI: Ms. Suyapa Connelly is a 57 yo female with a h/o severe PVD s/p b/l LE AKA, ESRD on HD, IDDM2, CAD s/p CABG, Afib (on eliquis) who presented after being found unresponsive at home. She has had recurrent hospitalization this past year d/t encephlopathy. Most recently on 3/22-3/23 due to progressive congitive decline. Admitted to MICU d/t requiring intubation for airway protection. Infectious Disease  consulted for prior h/o ESBL (recurrent UTI) admitted for AMS and urosepsis, started on meropenem.    She has been AF, HR 70-80, -130/50-60s, initially intubated on 30% FiO2/ PEEP 5, now extubated on RA. Labs without leukocytosis WBC of 5.25 on admission, and 6 today. Lactic acid 1.3. UDS negative. UA 3+ leukocytes, >100 WBC., many bacteria and WBC clumping. MRI brain without acute abnormality, simialry with CTA stroke. CXR with no acute cardiopulmonary process. Blood cultures from 5/7 thus far with NGTD, urine cx in process. Started on meropenem. She has 3 peripheral IV, LIJ HD catheter placed 1 year ago.     Upon evaluation, patient was AAOX1, to self only. Prior urine cx from 12/02  have grown proteus mirabilis (pan sensitive) amd urine cx from 3/11 grew ESBL Klebsiella pneumoniae. Notably, has a Penicillin allergy causing a rash; tolerated ceftriaxone on 1/13/20 and has itching with ciprofloxacin   Interval History: NAOE. Patient extubated and on RA. Patient able to respond to some question, but still appears quite sleepy. Urine Cx GNR, pending identification and susceptibility     Review of Systems   Unable to perform ROS: Other   Constitutional:  Negative for fatigue and fever.   HENT:  Positive for sore throat.    Cardiovascular:  Negative for chest pain.   Gastrointestinal:  Negative for abdominal pain and nausea.   Genitourinary:  Negative for flank pain.   Musculoskeletal:  Negative for back pain.     Objective:     Vital Signs (Most  Recent):  Temp: 97.2 °F (36.2 °C) (05/09/24 1300)  Pulse: 77 (05/09/24 1300)  Resp: 18 (05/09/24 1300)  BP: 120/75 (05/09/24 1300)  SpO2: 100 % (05/09/24 1300) Vital Signs (24h Range):  Temp:  [97.2 °F (36.2 °C)-98.6 °F (37 °C)] 97.2 °F (36.2 °C)  Pulse:  [77-87] 77  Resp:  [12-28] 18  SpO2:  [95 %-100 %] 100 %  BP: ()/(50-91) 120/75     Weight: 70.2 kg (154 lb 12.2 oz)  Body mass index is 47.23 kg/m².    Estimated Creatinine Clearance: 5.9 mL/min (A) (based on SCr of 7.9 mg/dL (H)).     Physical Exam  Constitutional:       Appearance: She is ill-appearing.   HENT:      Head: Normocephalic and atraumatic.      Mouth/Throat:      Mouth: Mucous membranes are moist.   Eyes:      Extraocular Movements: Extraocular movements intact.      Conjunctiva/sclera: Conjunctivae normal.   Cardiovascular:      Rate and Rhythm: Normal rate and regular rhythm.      Heart sounds: Normal heart sounds.      Comments: Has left sided tunnel catheter placed   Pulmonary:      Effort: Pulmonary effort is normal. No respiratory distress.      Breath sounds: Normal breath sounds.   Abdominal:      General: Abdomen is flat. Bowel sounds are normal. There is no distension.      Palpations: Abdomen is soft.      Tenderness: There is no abdominal tenderness. There is no guarding.   Genitourinary:     Comments: Kerns placed  Musculoskeletal:      Comments: B/l AKA amputation, no increased erythema noted    Skin:     General: Skin is warm.   Neurological:      Mental Status: She is lethargic.      Comments: Oriented to self and able to follow some commands           Significant Labs: All pertinent labs within the past 24 hours have been reviewed.    Significant Imaging: I have reviewed all pertinent imaging results/findings within the past 24 hours.

## 2024-05-09 NOTE — PLAN OF CARE
Problem: Physical Therapy  Goal: Physical Therapy Goal  Description: Goals to be met by: 2024    Patient will increase functional independence with mobility by performin. Supine to sit with Stand-by Assistance  2. Sit to supine with Stand-by Assistance  3. Rolling to Left and Right with Stand-by Assistance  4. Lateral Scooting to Left and Right with Stand-by Assistance    Outcome: Progressing     Eval completed. Goals appropriate.

## 2024-05-09 NOTE — ASSESSMENT & PLAN NOTE
Recent Labs   Lab 05/09/24  0408   WBC 7.12   RBC 3.38*   HGB 10.8*   HCT 32.7*      MCV 97   MCH 32.0*   MCHC 33.0       -- Type and screen   -- CBC daily   -- Transfuse for hgb < 7

## 2024-05-09 NOTE — RESIDENT HANDOFF
ICU Transfer of Care Note  Critical Care Medicine    Admit Date: 5/7/2024  LOS: 2    CC: Severe sepsis    Code Status: Full Code     HPI and Hospital Course:     HPI:  Ms. Suyapa Connelly is a 57 y/o F with hx of severe PVD ultimately requiring bilateral LE AKAs, ESRD on HD, IDDM2, CAD s/p CABG, Afib on eliquis, debility who initially presented to the ED via EMS after an episode of unresponsiveness at home. Patient was apparently at her mental baseline this morning, when she suddenly became unresponsive per . EMS was called and were unable to arouse patient. She was brought into the ED and intubated shortly after arrival. Of note, patient has had 6 different hospitalizations over the past year related to encephalopathy. Patient was most recently admitted 3/22-3/23/24 for evaluation of steady cognitive decline over the past year. It appears patient had declined over the past year to the extent that she is unable to perform independent ADLs.  not ready for NH placement as of yet.    In the ED, patient afebrile, SBP 120s, HR 80s, intubated at 40% FiO2 Peep 5. CBC grossly unchanged from baseline, no leukocytosis or worsening anemia. CMP without significant electrolyte abnormalities, BUN 59, BGL 130s. Lactic wnl. UA c/f infection with >100 wbc and many bacteria. UDS pan-negative. pH wnl and no hypercarbia on VBG. Patient admitted to MICU for continued management.     Hospital/ICU Course:  In the morning on reevaluation on SBT patient did well and she was extubated.  Nephrology consulted and HD completed.  ID consulted who recommended discontinuing IV Vancomycin and following up the urine culture results and just continuing with meropenem.  Reevaluated her known HFrEF with repeat Echo with similar EF of 15%.  SLP evaluated her for dysphagia and currently advising still NPO.       To Follow Up:     -- ID recommendations   -- Follow culture data   -- NPO. Working with SLP. Advance diet as tolerated   --  Nephrology following   -- PT / OT   -- Disposition - family previously did not want HH v SNF    Discharge Plan:     Per primary. Would likely benefit from skilled services.     Call with questions.    NONI Nicolas, MSN, [facility]  Pulmonary Critical Care Medicine   05/09/2024

## 2024-05-09 NOTE — SUBJECTIVE & OBJECTIVE
Interval History/Significant Events: No desaturations overnight, still has a sore throat but notes improvement    Review of Systems   Constitutional:  Negative for fatigue and fever.   HENT:  Positive for sore throat.    Cardiovascular:  Negative for chest pain.   Gastrointestinal:  Negative for abdominal pain and nausea.   Genitourinary:  Negative for flank pain.   Musculoskeletal:  Negative for back pain.     Objective:     Vital Signs (Most Recent):  Temp: 98.1 °F (36.7 °C) (05/09/24 0900)  Pulse: 83 (05/09/24 0900)  Resp: 18 (05/09/24 0900)  BP: 129/60 (05/09/24 0900)  SpO2: 100 % (05/09/24 0900) Vital Signs (24h Range):  Temp:  [97.2 °F (36.2 °C)-98.8 °F (37.1 °C)] 98.1 °F (36.7 °C)  Pulse:  [78-86] 83  Resp:  [12-28] 18  SpO2:  [95 %-100 %] 100 %  BP: ()/(50-91) 129/60   Weight: 70.2 kg (154 lb 12.2 oz)  Body mass index is 47.23 kg/m².      Intake/Output Summary (Last 24 hours) at 5/9/2024 0955  Last data filed at 5/9/2024 0900  Gross per 24 hour   Intake 514.5 ml   Output 245 ml   Net 269.5 ml          Physical Exam  Vitals and nursing note reviewed.   Constitutional:       Appearance: She is well-developed.   HENT:      Head: Normocephalic.      Comments: Mucous membranes appear dry     Mouth/Throat:      Mouth: Mucous membranes are moist.   Eyes:      Pupils: Pupils are equal, round, and reactive to light.   Cardiovascular:      Rate and Rhythm: Normal rate and regular rhythm.      Pulses: Normal pulses.   Pulmonary:      Effort: Pulmonary effort is normal.   Abdominal:      General: Bowel sounds are normal. There is no distension.      Palpations: Abdomen is soft.      Tenderness: There is no abdominal tenderness.   Musculoskeletal:         General: No swelling.      Comments: BLAKA   Skin:     General: Skin is warm and dry.   Neurological:      Mental Status: She is alert.            Vents:  Vent Mode: Spont (05/08/24 0815)  Ventilator Initiated: Yes (05/07/24 2114)  Set Rate: 18 BPM (05/08/24  0815)  Vt Set: 350 mL (05/08/24 0815)  Pressure Support: 10 cmH20 (05/08/24 0815)  PEEP/CPAP: 5 cmH20 (05/08/24 0815)  Oxygen Concentration (%): 30 (05/08/24 0900)  Peak Airway Pressure: 16 cmH20 (05/08/24 0815)  Plateau Pressure: 0 cmH20 (05/08/24 0815)  Total Ve: 7.4 L/m (05/08/24 0815)  Negative Inspiratory Force (cm H2O): 0 (05/08/24 0815)  F/VT Ratio<105 (RSBI): 114.68 (05/08/24 0815)  Lines/Drains/Airways       Central Venous Catheter Line  Duration                  Hemodialysis Catheter 05/25/23 1336 left internal jugular 349 days              Drain  Duration                  Urethral Catheter 05/07/24 2 days              Peripheral Intravenous Line  Duration                  Peripheral IV - Single Lumen 05/07/24 20 G Distal;Posterior;Right Forearm 2 days         Peripheral IV - Single Lumen 05/07/24 2100 20 G Anterior;Right Hand 1 day         Peripheral IV - Single Lumen 05/07/24 2106 20 G Anterior;Left Wrist 1 day                  Significant Labs:    CBC/Anemia Profile:  Recent Labs   Lab 05/07/24 2104 05/08/24  0130 05/09/24  0408   WBC 5.25 6.00 7.12   HGB 11.8* 10.9* 10.8*   HCT 35.5* 33.2* 32.7*    184 183   MCV 96 96 97   RDW 16.8* 16.9* 17.2*        Chemistries:  Recent Labs   Lab 05/07/24 2104 05/08/24  0600 05/09/24  0408    136 140   K 5.1 4.5 4.8    103 104   CO2 23 18* 24   BUN 59* 59* 62*   CREATININE 7.5* 7.6* 7.9*   CALCIUM 9.0 8.8 9.0   ALBUMIN 2.7* 2.2* 2.2*   PROT 8.5* 7.5 7.4   BILITOT 0.8 0.9 0.9   ALKPHOS 164* 139* 129   ALT 10 10 8*   AST 20 22 18   MG 2.2 2.2 2.3   PHOS  --  3.7 5.7*       Recent Lab Results  (Last 5 results in the past 24 hours)        05/09/24  0519   05/09/24  0408   05/09/24  0011   05/08/24  1726   05/08/24  1723        Albumin   2.2             ALP   129             ALT   8             Anion Gap   12             PTT   36.7  Comment: Refer to local heparin nomogram for intensity/dose specific   therapeutic   range.               AST   18              Baso #   0.05             Basophil %   0.7             BILIRUBIN TOTAL   0.9  Comment: For infants and newborns, interpretation of results should be based  on gestational age, weight and in agreement with clinical  observations.    Premature Infant recommended reference ranges:  Up to 24 hours.............<8.0 mg/dL  Up to 48 hours............<12.0 mg/dL  3-5 days..................<15.0 mg/dL  6-29 days.................<15.0 mg/dL               BUN   62             Calcium   9.0             Chloride   104             CO2   24             Creatinine   7.9             Differential Method   Automated             eGFR   5.5             Eos #   0.1             Eos %   1.8             Glucose   95             Gran # (ANC)   5.2             Gran %   72.5             Hematocrit   32.7             Hemoglobin   10.8             Immature Grans (Abs)   0.02  Comment: Mild elevation in immature granulocytes is non specific and   can be seen in a variety of conditions including stress response,   acute inflammation, trauma and pregnancy. Correlation with other   laboratory and clinical findings is essential.               Immature Granulocytes   0.3             Lymph #   0.9             Lymph %   13.2             Magnesium    2.3             MCH   32.0             MCHC   33.0             MCV   97             Mono #   0.8             Mono %   11.5             MPV   11.9             nRBC   0             Phosphorus Level   5.7             Platelet Estimate   Appears normal             Platelet Count   183             POCT Glucose 102     98   128         Potassium   4.8             PROTEIN TOTAL   7.4             RBC   3.38             RDW   17.2             Sodium   140             Troponin I         0.060  Comment: The reference interval for Troponin I represents the 99th percentile   cutoff   for our facility and is consistent with 3rd generation assay   performance.         WBC   7.12                                  All  pertinent labs within the past 24 hours have been reviewed.    Significant Imaging:  I have reviewed all pertinent imaging results/findings within the past 24 hours.

## 2024-05-09 NOTE — PT/OT/SLP PROGRESS
"Speech Language Pathology Treatment    Patient Name:  Suyapa Connelly   MRN:  [id number]  Admitting Diagnosis: Severe sepsis    Recommendations:                 General Recommendations:  Dysphagia therapy  Diet recommendations:  Puree Diet - IDDSI Level 4, Liquid Diet Level: Thin liquids - IDDSI Level 0   Aspiration Precautions: 1 bite/sip at a time, Assistance with meals, Eliminate distractions, Feed only when awake/alert, HOB to 90 degrees, Meds crushed in puree, and Strict aspiration precautions   General Precautions: Standard, aspiration, fall, pureed diet  Communication strategies:  provide increased time to answer    Assessment:     Suyapa Connelly is a 58 y.o. female with an SLP diagnosis of Dysphagia.  She presents with improvements in tolerance of oral trials this date in addition to increasing levels of alertness to aid in safety with PO intake. Pt would benefit form a modified diet to ensure ongoing efficacy of oral intake. SLP to continue to follow.      Subjective     Spoke with nursing prior to session. Pt found resting in bed upon SLP entry into room. Pt agreeable to participate in all aspects of session.      Patient goals: "I'm alright"     Pain/Comfort:  Pain Rating 1: 0/10    Respiratory Status: Nasal cannula, flow 3 L/min    Objective:     Has the patient been evaluated by SLP for swallowing?   Yes  Keep patient NPO? No   Current Respiratory Status:        Pt seen for ongoing dysphagia therapy. Pt with significant increase in levels of alertness this date as she was awake throughout session without need for cueing and was able to participate in basic conversational exchanges. Responses intermittently delay but appropriate. HOB elevated for all PO intake. Sips of tihn liquids via straw sip completed 2/2 difficulty with feeding tasks. Single sips of thin liquids x5 tolerated without difficulty. Bites of puree completed with pt consuming entire pudding cup with mildly increased AP bolus transport " time though adequate oral clearance achieved across all trials. Bites of semi solids (kranthi crackers dipped in pudding) reveled prolongued mastication with MILD oral residue; cued liquid wash completed which was successful in clearing some but not all of oral residue. SLP provided education regarding overall impressions, pureed diet with thin liquids, safe swallow strategies, and ongoing SLP POC. Pt verbalized understanding but would continue to benefit from ongoing education. Whiteboard updated.  Spoke with RN regarding impressions and recommendations and nursing verbalized understanding.      Goals:   Multidisciplinary Problems       SLP Goals          Problem: SLP    Goal Priority Disciplines Outcome   SLP Goal     SLP Progressing   Description: Speech Pathology Goals  To be met by 5/22/24    1. Pt will participate in ongoing diagnostic dysphagia therapy                               Plan:     Patient to be seen:  4 x/week   Plan of Care expires:  06/07/24  Plan of Care reviewed with:  patient (RN)   SLP Follow-Up:  Yes       Discharge recommendations:  Low Intensity Therapy   Barriers to Discharge:  None    Time Tracking:     SLP Treatment Date:   05/09/24  Speech Start Time:  0904  Speech Stop Time:  0917     Speech Total Time (min):  13 min    Billable Minutes: Treatment Swallowing Dysfunction 13      05/09/2024

## 2024-05-09 NOTE — SUBJECTIVE & OBJECTIVE
Interval History: NAOE. Patient extubated and on RA. Patient able to respond to some question, but still appears quite sleepy. Urine Cx GNR, pending identification and susceptibility     Review of Systems   Unable to perform ROS: Other   Constitutional:  Negative for fatigue and fever.   HENT:  Positive for sore throat.    Cardiovascular:  Negative for chest pain.   Gastrointestinal:  Negative for abdominal pain and nausea.   Genitourinary:  Negative for flank pain.   Musculoskeletal:  Negative for back pain.     Objective:     Vital Signs (Most Recent):  Temp: 97.2 °F (36.2 °C) (05/09/24 1300)  Pulse: 77 (05/09/24 1300)  Resp: 18 (05/09/24 1300)  BP: 120/75 (05/09/24 1300)  SpO2: 100 % (05/09/24 1300) Vital Signs (24h Range):  Temp:  [97.2 °F (36.2 °C)-98.6 °F (37 °C)] 97.2 °F (36.2 °C)  Pulse:  [77-87] 77  Resp:  [12-28] 18  SpO2:  [95 %-100 %] 100 %  BP: ()/(50-91) 120/75     Weight: 70.2 kg (154 lb 12.2 oz)  Body mass index is 47.23 kg/m².    Estimated Creatinine Clearance: 5.9 mL/min (A) (based on SCr of 7.9 mg/dL (H)).     Physical Exam  Constitutional:       Appearance: She is ill-appearing.   HENT:      Head: Normocephalic and atraumatic.      Mouth/Throat:      Mouth: Mucous membranes are moist.   Eyes:      Extraocular Movements: Extraocular movements intact.      Conjunctiva/sclera: Conjunctivae normal.   Cardiovascular:      Rate and Rhythm: Normal rate and regular rhythm.      Heart sounds: Normal heart sounds.      Comments: Has left sided tunnel catheter placed   Pulmonary:      Effort: Pulmonary effort is normal. No respiratory distress.      Breath sounds: Normal breath sounds.   Abdominal:      General: Abdomen is flat. Bowel sounds are normal. There is no distension.      Palpations: Abdomen is soft.      Tenderness: There is no abdominal tenderness. There is no guarding.   Genitourinary:     Comments: Kerns placed  Musculoskeletal:      Comments: B/l AKA amputation, no increased erythema  noted    Skin:     General: Skin is warm.   Neurological:      Mental Status: She is lethargic.      Comments: Oriented to self and able to follow some commands           Significant Labs: All pertinent labs within the past 24 hours have been reviewed.    Significant Imaging: I have reviewed all pertinent imaging results/findings within the past 24 hours.

## 2024-05-09 NOTE — ASSESSMENT & PLAN NOTE
Patient's FSGs are controlled on current medication regimen.  Last A1c reviewed-   Lab Results   Component Value Date    HGBA1C 8.0 (H) 04/24/2024     Most recent fingerstick glucose reviewed-   Recent Labs   Lab 05/08/24  1726 05/09/24  0011 05/09/24  0519   POCTGLUCOSE 128* 98 102       Current correctional scale  Low  Maintain anti-hyperglycemic dose as follows-   Antihyperglycemics (From admission, onward)    Start     Stop Route Frequency Ordered    05/07/24 2347  insulin aspart U-100 pen 0-10 Units         -- SubQ Every 6 hours PRN 05/07/24 2247        Hold Oral hypoglycemics while patient is in the hospital.

## 2024-05-09 NOTE — ASSESSMENT & PLAN NOTE
This patient does have evidence of infective focus  My overall impression is sepsis.  Source: Urinary Tract    Antibiotics given-   Antibiotics (72h ago, onward)      Start     Stop Route Frequency Ordered    05/09/24 0100  meropenem (MERREM) 500 mg in sodium chloride 0.9 % 100 mL IVPB (MB+)         -- IV Every 12 hours (non-standard times) 05/08/24 1135          Latest lactate reviewed-  Recent Labs   Lab 05/07/24 2104 05/07/24  2125   LACTATE 1.3  --    POCLAC  --  1.42       Organ dysfunction indicated by Encephalopathy    Fluid challenge Contraindicated- Fluid bolus is contraindicated in this patient due to End Stage Renal Disease     Post- resuscitation assessment No - Post resuscitation assessment not needed     Will Not start Pressors- Levophed for MAP of 65  Source control achieved by:     -- Continue BSA   -- Follow up infectious work up: UCx with gram negative rods, susceptibilities pending   -- MAP > 65

## 2024-05-09 NOTE — ASSESSMENT & PLAN NOTE
-- Nephrology consulted [facility]  -- Renally dose all medications   -- Avoid nephrotoxins   -- Kerns w strict I&O   -- MAP > 65   -- Hgb > 7

## 2024-05-09 NOTE — PT/OT/SLP EVAL
Occupational Therapy  Co Evaluation    Name: Suyapa Connelly  MRN: [id number]  Admitting Diagnosis: Severe sepsis  Pt with hx of B AKA and found unresponsive at home. Medical chart indicates steady cognitive decline in last year.   Recommendations:     Discharge Recommendations: Low Intensity Therapy      Assessment:     Suyapa Connelly is a 58 y.o. female with a medical diagnosis of Severe sepsis. Performance deficits affecting function: weakness, impaired endurance, impaired self care skills, impaired functional mobility, gait instability, impaired balance, decreased safety awareness, decreased upper extremity function, decreased lower extremity function.  Pt tolerated session fairly well. VSS throughout session.     Rehab Prognosis: Fair; patient would benefit from acute skilled OT services to address these deficits and reach maximum level of function.       Plan:     Patient to be seen 3 x/week to address the above listed problems via self-care/home management, neuromuscular re-education, cognitive retraining, therapeutic activities, therapeutic exercises  Plan of Care Expires:    Plan of Care Reviewed with: patient    Subjective     Pt agreeable to therapy     Occupational Profile:  Pt resides with spouse and medical chart indicates she has Brandee lift, w/c and tub bench.   Spouse provides assist for transfers most recently and for ADL's.   Prior therapy documentation indicates she required SBA for bed mobility and SBA for scooting t/f to right and MOD A for scooting to the left.     Pain/Comfort:  Pain Rating 1: 0/10    Patients cultural, spiritual, Gnosticism conflicts given the current situation: no    Objective:     Communicated with: nsg prior to session.  Patient found in bed with tele, pulse ox, BP cuff and snow.   Co reyna completed this date to optimize functional performance and safety given impaired tolerance for activity in setting of ICU   General Precautions: Standard,  fall    Occupational  Performance:    Bed Mobility:    Supine<>sit with TOTAL x 2     Activities of Daily Living:  TOTAL A for all ADL skills due to impaired alertness and impaired functional strength of UE's.     Cognitive/Visual Perceptual  Pt awake, but lethargic with eyes open approx 50% of session.   Pt followed simple commands.   Impaired alertness at times with impaired attention and memory.     Physical Exam  Pt is right hand dominant and demo 2+/5 strength and Fair .   Fingers on both hands with flexion contractures at all PIP joints    AMPA 6 Click ADL:  AMPA Total Score: 6    Treatment & Education:  Neuro re-ed activity completed seated EOB to promote upright/midline orientation needed for functional activity. Pt tolerated sitting EOB approx 5 min with TOTAL A for postural control. Pt with left and posterior lean which she was unable to correct with max vc/tc.   Pt did initiate scooting upon command to the right but required TOTAL A  for fully complete.   Full orientation provided throughout session with cues for attention and alertness to optimize performance and engagement with poor response   Education provided re: role of OT and safety with functional mobility/ADl skills.       Patient left HOB elevated with all lines intact, call button in reach, and nsg notified    GOALS:   Multidisciplinary Problems       Occupational Therapy Goals       Not on file                    History:     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    Osteomyelitis     Osteomyelitis of left foot 04/30/2021    Ulcer of left foot     Vaginal delivery     x1         Past Surgical History:   Procedure Laterality Date    ABOVE-KNEE AMPUTATION Left 5/18/2021    Procedure:  AMPUTATION, ABOVE KNEE;  Surgeon: Teddy Huber MD;  Location: [address];  Service: Vascular;  Laterality: Left;    ABOVE-KNEE AMPUTATION Right 3/18/2022    Procedure: AMPUTATION, ABOVE KNEE;  Surgeon: DAYNE Florez II, MD;  Location: [facility] OR [address] [place] [address];  Service: Vascular;  Laterality: Right;    Angiogram - Right Extremity Right 7/9/15    angiogram-left leg  10/6/15    ANGIOGRAPHY OF LOWER EXTREMITY Left 4/29/2021    Procedure: ANGIOGRAM, LOWER EXTREMITY;  Surgeon: Teddy Huber MD;  Location: [address];  Service: Vascular;  Laterality: Left;    BELOW KNEE AMPUTATION OF LOWER EXTREMITY Right 12/28/2021    Procedure: AMPUTATION, BELOW KNEE;  Surgeon: Kaitlyn Rojas MD;  Location: [facility];  Service: General;  Laterality: Right;    CATHETERIZATION OF BOTH LEFT AND RIGHT HEART N/A 12/18/2019    Procedure: CATHETERIZATION, HEART, BOTH LEFT AND RIGHT;  Surgeon: Que Fernando III, MD;  Location: [facility] CATH LAB;  Service: Cardiology;  Laterality: N/A;    CORONARY ANGIOGRAPHY N/A 12/18/2019    Procedure: ANGIOGRAM, CORONARY ARTERY;  Surgeon: Que Fernando III, MD;  Location: [facility] CATH LAB;  Service: Cardiology;  Laterality: N/A;    CORONARY ANGIOGRAPHY INCLUDING BYPASS GRAFTS WITH CATHETERIZATION OF LEFT HEART N/A 7/28/2020    Procedure: ANGIOGRAM, CORONARY, INCLUDING BYPASS GRAFT, WITH LEFT HEART CATHETERIZATION, 9 am;  Surgeon: Rachel Easley MD;  Location: [facility] CATH LAB;  Service: Cardiology;  Laterality: N/A;    CORONARY ARTERY BYPASS GRAFT (CABG) N/A 1/14/2020    Procedure: CORONARY ARTERY BYPASS GRAFT (CABG) x 1     Off Pump;  Surgeon: Huang Altamirano MD;  Location: [address];  Service: Cardiovascular;  Laterality: N/A;    CREATION OF FEMORAL-TIBIAL ARTERY BYPASS Left 4/29/2021    Procedure: CREATION, BYPASS, ARTERIAL, FEMORAL TO ANTERIOR TIBIAL;  Surgeon: Teddy Huber MD;  Location: [address];  Service: Vascular;  Laterality: Left;    CREATION OF  FEMOROPOPLITEAL ARTERIAL BYPASS USING GRAFT Left 8/18/2020    Procedure: CREATION, BYPASS, ARTERIAL, FEMORAL TO POPLITEAL, USING GRAFT, LEFT LOWER EXTREMITY;  Surgeon: Teddy Huber MD;  Location: [facility] OR;  Service: Vascular;  Laterality: Left;  REQUEST 7:15 A.M. START----COVID NEGATIVE ON 8/17  1ST CASE STARTE PER LEANA ON 8/7/2020 @ 942AM-LO  RN PREOP 8/12/2020   T/S-----CLEARED BY CARDS-------PENDING INSURANCE    DEBRIDEMENT OF FOOT Left 9/8/2020    Procedure: DEBRIDEMENT, FOOT;  Surgeon: Rosio Mayes DPM;  Location: [facility] OR;  Service: Podiatry;  Laterality: Left;  request neoxx .   RN Pre Op 9-4-2020, Covid negative on 9/5/20. C A    DEBRIDEMENT OF FOOT  3/4/2021    Procedure: DEBRIDEMENT, FOOT;  Surgeon: Teddy Huber MD;  Location: [facility] OR;  Service: Vascular;;    DEBRIDEMENT OF FOOT Left 3/9/2021    Procedure: DEBRIDEMENT, FOOT, bone biopsy;  Surgeon: Rosio Mayes DPM;  Location: [facility] OR;  Service: Podiatry;  Laterality: Left;  Request neoxx---COVID IN AM  REQUESTING NOON START  RN Phone Pre op.On Blood thinners Plavix and Eliquis.  Covid am of surgery. C A    DEBRIDEMENT OF FOOT Left 5/4/2021    Procedure: DEBRIDEMENT, FOOT;  Surgeon: Farooq Morley DPM;  Location: [address];  Service: Podiatry;  Laterality: Left;    INSERTION OF TUNNELED CENTRAL VENOUS HEMODIALYSIS CATHETER N/A 1/27/2020    Procedure: Insertion, Catheter, Central Venous, Hemodialysis;  Surgeon: ESTEBAN Gomez III, MD;  Location: [facility] CATH LAB;  Service: Peripheral Vascular;  Laterality: N/A;    INSERTION OF TUNNELED CENTRAL VENOUS HEMODIALYSIS CATHETER  5/10/2023    Procedure: Insertion, Catheter, Central Venous, Hemodialysis;  Surgeon: Romulo Queen MD;  Location: [facility] CATH LAB;  Service: Cardiology;;    PERCUTANEOUS TRANSLUMINAL ANGIOPLASTY N/A 3/4/2021    Procedure: PTA (ANGIOPLASTY, PERCUTANEOUS, TRANSLUMINAL);  Surgeon: Teddy Huber MD;  Location: [facility] OR;  Service: Vascular;  Laterality:  N/A;    REMOVAL OF ARTERIOVENOUS GRAFT Left 5/27/2021    Procedure: REMOVAL, GRAFT, LEFT LOWER EXTREMITY, WOUND EXPLORATION;  Surgeon: Teddy Huber MD;  [facility];  Service: Vascular;  Laterality: Left;    REMOVAL OF NAIL OF DIGIT Left 3/9/2021    Procedure: REMOVAL, NAIL, DIGIT;  Surgeon: Rosio Mayes DPM;  Location: [facility] OR;  Service: Podiatry;  Laterality: Left;    RIGHT HEART CATHETERIZATION Right 5/10/2023    Procedure: INSERTION, CATHETER, RIGHT HEART;  Surgeon: Romulo Queen MD;  Location: [facility] CATH LAB;  Service: Cardiology;  Laterality: Right;    THROMBECTOMY Left 3/4/2021    Procedure: THROMBECTOMY, LEFT LOWER EXTREMITY BYPASS GRAFT, ANGIOGRAM, POSSIBLE INTERVENTION, POSSIBLE LEFT LOWER EXTREMITY BYPASS;  Surgeon: Teddy Huber MD;  Location: [facility] OR;  Service: Vascular;  Laterality: Left;    THROMBECTOMY Left 4/29/2021    Procedure: GRAFT THROMBECTOMY, LEFT LOWER EXTREMITY;  Surgeon: Teddy Huber MD;  [facility];  Service: Vascular;  Laterality: Left;  14.5 min  1179.85 mGy  341.01 Gycm2  240 ml dye    THROMBECTOMY  10/22/2021    Procedure: THROMBECTOMY;  Surgeon: Saad Arenas MD;  Location: [facility] CATH LAB/EP;  Service: Cardiology;;       Time Tracking:     OT Date of Treatment: 05/09/24  OT Start Time: 0824  OT Stop Time: 0844  OT Total Time (min): 20 min    Billable Minutes:Evaluation 10  Neuromuscular Re-education 10    5/9/2024

## 2024-05-09 NOTE — ASSESSMENT & PLAN NOTE
-- Vascular neurology consulted. Appreciate assistance   -- CTH, MRI brain negative   -- Tox negative  -- Delirium precautions   -- Replace electrolytes to keep Mg > 2, Phos > 3, and K > 4  -- Nephrology HD MWF, dialyzed 5/8/24  -- NPO per SLP  -- Further management per severe sepsis

## 2024-05-09 NOTE — ASSESSMENT & PLAN NOTE
[id number] Score: 3    6 High Risk:18.2% if no warfarin  5 High Risk: 12.5% if no warfarin  4 High Risk: 8.5% if no warfarin  3 High Risk: 5.9% if no warfarin  2 Intermediate Risk: 4% if no warfarin  1 Intermediate Risk: 2.8% if no warfarin    -- Continue home Coreg   -- Heparin gtt per nanogram while NPO  -- PT/INR   -- Trend CBCs  -- Continuous telemetry   -- EKG for acute changes   -- Keep Mg > 2, Phos > 3, and K > 4

## 2024-05-09 NOTE — ASSESSMENT & PLAN NOTE
Nutrition consulted. Most recent weight and BMI monitored-     Measurements:  Wt Readings from Last 1 Encounters:   05/09/24 70.2 kg (154 lb 12.2 oz)   Body mass index is 47.23 kg/m².    Patient has been screened and assessed by RD.    Malnutrition Type:  Context: acute illness or injury  Level: moderate    Malnutrition Characteristic Summary:  Weight Loss (Malnutrition): 10% in 6 months  Subcutaneous Fat (Malnutrition): mild depletion  Muscle Mass (Malnutrition): mild depletion    Interventions/Recommendations (treatment strategy):  1.  Nutrition consulted, NPO per SLP   - F/u SLP recs

## 2024-05-10 PROBLEM — Z99.11 ON MECHANICALLY ASSISTED VENTILATION: Status: RESOLVED | Noted: 2024-05-08 | Resolved: 2024-05-10

## 2024-05-10 PROBLEM — E44.0 MODERATE MALNUTRITION: Status: RESOLVED | Noted: 2024-05-08 | Resolved: 2024-05-10

## 2024-05-10 LAB
ALBUMIN SERPL BCP-MCNC: 2.5 G/DL (ref 3.5–5.2)
ALP SERPL-CCNC: 170 U/L (ref 55–135)
ALT SERPL W/O P-5'-P-CCNC: 9 U/L (ref 10–44)
ANION GAP SERPL CALC-SCNC: 8 MMOL/L (ref 8–16)
AST SERPL-CCNC: 26 U/L (ref 10–40)
BACTERIA UR CULT: ABNORMAL
BASOPHILS # BLD AUTO: 0.06 K/UL (ref 0–0.2)
BASOPHILS NFR BLD: 0.9 % (ref 0–1.9)
BILIRUB SERPL-MCNC: 0.7 MG/DL (ref 0.1–1)
BUN SERPL-MCNC: 27 MG/DL (ref 6–20)
CALCIUM SERPL-MCNC: 9.7 MG/DL (ref 8.7–10.5)
CHLORIDE SERPL-SCNC: 105 MMOL/L (ref 95–110)
CO2 SERPL-SCNC: 23 MMOL/L (ref 23–29)
CREAT SERPL-MCNC: 3.9 MG/DL (ref 0.5–1.4)
DIFFERENTIAL METHOD BLD: ABNORMAL
EOSINOPHIL # BLD AUTO: 0.2 K/UL (ref 0–0.5)
EOSINOPHIL NFR BLD: 2.3 % (ref 0–8)
ERYTHROCYTE [DISTWIDTH] IN BLOOD BY AUTOMATED COUNT: 17.2 % (ref 11.5–14.5)
EST. GFR  (NO RACE VARIABLE): 12.8 ML/MIN/1.73 M^2
GLUCOSE SERPL-MCNC: 116 MG/DL (ref 70–110)
HBV SURFACE AG SERPL QL IA: NORMAL
HCT VFR BLD AUTO: 35.4 % (ref 37–48.5)
HGB BLD-MCNC: 11.2 G/DL (ref 12–16)
IMM GRANULOCYTES # BLD AUTO: 0.02 K/UL (ref 0–0.04)
IMM GRANULOCYTES NFR BLD AUTO: 0.3 % (ref 0–0.5)
LYMPHOCYTES # BLD AUTO: 0.7 K/UL (ref 1–4.8)
LYMPHOCYTES NFR BLD: 10.7 % (ref 18–48)
MAGNESIUM SERPL-MCNC: 2 MG/DL (ref 1.6–2.6)
MCH RBC QN AUTO: 30.9 PG (ref 27–31)
MCHC RBC AUTO-ENTMCNC: 31.6 G/DL (ref 32–36)
MCV RBC AUTO: 98 FL (ref 82–98)
MONOCYTES # BLD AUTO: 0.7 K/UL (ref 0.3–1)
MONOCYTES NFR BLD: 11.5 % (ref 4–15)
NEUTROPHILS # BLD AUTO: 4.8 K/UL (ref 1.8–7.7)
NEUTROPHILS NFR BLD: 74.3 % (ref 38–73)
NRBC BLD-RTO: 0 /100 WBC
OHS QRS DURATION: 90 MS
OHS QTC CALCULATION: 473 MS
PHOSPHATE SERPL-MCNC: 2.8 MG/DL (ref 2.7–4.5)
PLATELET # BLD AUTO: 186 K/UL (ref 150–450)
PMV BLD AUTO: 11.4 FL (ref 9.2–12.9)
POCT GLUCOSE: 107 MG/DL (ref 70–110)
POCT GLUCOSE: 109 MG/DL (ref 70–110)
POCT GLUCOSE: 110 MG/DL (ref 70–110)
POCT GLUCOSE: 126 MG/DL (ref 70–110)
POCT GLUCOSE: 143 MG/DL (ref 70–110)
POCT GLUCOSE: 168 MG/DL (ref 70–110)
POTASSIUM SERPL-SCNC: 3.9 MMOL/L (ref 3.5–5.1)
PROT SERPL-MCNC: 8.5 G/DL (ref 6–8.4)
RBC # BLD AUTO: 3.63 M/UL (ref 4–5.4)
SODIUM SERPL-SCNC: 136 MMOL/L (ref 136–145)
WBC # BLD AUTO: 6.44 K/UL (ref 3.9–12.7)

## 2024-05-10 PROCEDURE — 63600175 PHARM REV CODE 636 W HCPCS: Mod: HCNC | Performed by: STUDENT IN AN ORGANIZED HEALTH CARE EDUCATION/TRAINING PROGRAM

## 2024-05-10 PROCEDURE — 80053 COMPREHEN METABOLIC PANEL: CPT | Mod: HCNC | Performed by: STUDENT IN AN ORGANIZED HEALTH CARE EDUCATION/TRAINING PROGRAM

## 2024-05-10 PROCEDURE — 99233 SBSQ HOSP IP/OBS HIGH 50: CPT | Mod: HCNC,GC,, | Performed by: INTERNAL MEDICINE

## 2024-05-10 PROCEDURE — 80100014 HC HEMODIALYSIS 1:1: Mod: HCNC

## 2024-05-10 PROCEDURE — 25000003 PHARM REV CODE 250: Mod: HCNC | Performed by: NURSE PRACTITIONER

## 2024-05-10 PROCEDURE — 63600175 PHARM REV CODE 636 W HCPCS: Mod: HCNC | Performed by: INTERNAL MEDICINE

## 2024-05-10 PROCEDURE — 21400001 HC TELEMETRY ROOM: Mod: HCNC

## 2024-05-10 PROCEDURE — 25000003 PHARM REV CODE 250: Mod: HCNC | Performed by: INTERNAL MEDICINE

## 2024-05-10 PROCEDURE — 85025 COMPLETE CBC W/AUTO DIFF WBC: CPT | Mod: HCNC | Performed by: STUDENT IN AN ORGANIZED HEALTH CARE EDUCATION/TRAINING PROGRAM

## 2024-05-10 PROCEDURE — 25000003 PHARM REV CODE 250: Mod: HCNC | Performed by: STUDENT IN AN ORGANIZED HEALTH CARE EDUCATION/TRAINING PROGRAM

## 2024-05-10 PROCEDURE — 5A1D70Z PERFORMANCE OF URINARY FILTRATION, INTERMITTENT, LESS THAN 6 HOURS PER DAY: ICD-10-PCS | Performed by: INTERNAL MEDICINE

## 2024-05-10 PROCEDURE — 99233 SBSQ HOSP IP/OBS HIGH 50: CPT | Mod: HCNC,GC,, | Performed by: STUDENT IN AN ORGANIZED HEALTH CARE EDUCATION/TRAINING PROGRAM

## 2024-05-10 PROCEDURE — 84100 ASSAY OF PHOSPHORUS: CPT | Mod: HCNC | Performed by: STUDENT IN AN ORGANIZED HEALTH CARE EDUCATION/TRAINING PROGRAM

## 2024-05-10 PROCEDURE — 92526 ORAL FUNCTION THERAPY: CPT | Mod: HCNC

## 2024-05-10 PROCEDURE — 87340 HEPATITIS B SURFACE AG IA: CPT | Mod: HCNC | Performed by: STUDENT IN AN ORGANIZED HEALTH CARE EDUCATION/TRAINING PROGRAM

## 2024-05-10 PROCEDURE — 97535 SELF CARE MNGMENT TRAINING: CPT | Mod: HCNC

## 2024-05-10 PROCEDURE — 63600175 PHARM REV CODE 636 W HCPCS: Mod: HCNC | Performed by: NURSE PRACTITIONER

## 2024-05-10 PROCEDURE — 83735 ASSAY OF MAGNESIUM: CPT | Mod: HCNC | Performed by: STUDENT IN AN ORGANIZED HEALTH CARE EDUCATION/TRAINING PROGRAM

## 2024-05-10 PROCEDURE — 99233 SBSQ HOSP IP/OBS HIGH 50: CPT | Mod: HCNC,,, | Performed by: INTERNAL MEDICINE

## 2024-05-10 RX ORDER — CARVEDILOL 6.25 MG/1
6.25 TABLET ORAL 2 TIMES DAILY
Status: DISCONTINUED | OUTPATIENT
Start: 2024-05-10 | End: 2024-05-21 | Stop reason: HOSPADM

## 2024-05-10 RX ORDER — OXYCODONE HYDROCHLORIDE 5 MG/1
5 TABLET ORAL EVERY 6 HOURS PRN
Status: DISCONTINUED | OUTPATIENT
Start: 2024-05-10 | End: 2024-05-21 | Stop reason: HOSPADM

## 2024-05-10 RX ORDER — FAMOTIDINE 20 MG/1
20 TABLET, FILM COATED ORAL DAILY
Status: DISCONTINUED | OUTPATIENT
Start: 2024-05-11 | End: 2024-05-21 | Stop reason: HOSPADM

## 2024-05-10 RX ORDER — CLOPIDOGREL BISULFATE 75 MG/1
75 TABLET ORAL DAILY
Status: DISCONTINUED | OUTPATIENT
Start: 2024-05-10 | End: 2024-05-21 | Stop reason: HOSPADM

## 2024-05-10 RX ORDER — ATORVASTATIN CALCIUM 40 MG/1
80 TABLET, FILM COATED ORAL DAILY
Status: DISCONTINUED | OUTPATIENT
Start: 2024-05-10 | End: 2024-05-21 | Stop reason: HOSPADM

## 2024-05-10 RX ADMIN — APIXABAN 5 MG: 5 TABLET, FILM COATED ORAL at 08:05

## 2024-05-10 RX ADMIN — TRAZODONE HYDROCHLORIDE 25 MG: 50 TABLET ORAL at 09:05

## 2024-05-10 RX ADMIN — SERTRALINE HYDROCHLORIDE 100 MG: 50 TABLET ORAL at 08:05

## 2024-05-10 RX ADMIN — ATORVASTATIN CALCIUM 80 MG: 40 TABLET, FILM COATED ORAL at 08:05

## 2024-05-10 RX ADMIN — ALLOPURINOL 50 MG: 300 TABLET ORAL at 10:05

## 2024-05-10 RX ADMIN — FAMOTIDINE 20 MG: 10 INJECTION, SOLUTION INTRAVENOUS at 08:05

## 2024-05-10 RX ADMIN — APIXABAN 5 MG: 5 TABLET, FILM COATED ORAL at 09:05

## 2024-05-10 RX ADMIN — INSULIN ASPART 1 UNITS: 100 INJECTION, SOLUTION INTRAVENOUS; SUBCUTANEOUS at 12:05

## 2024-05-10 RX ADMIN — CARVEDILOL 6.25 MG: 6.25 TABLET, FILM COATED ORAL at 09:05

## 2024-05-10 RX ADMIN — CARVEDILOL 6.25 MG: 6.25 TABLET, FILM COATED ORAL at 08:05

## 2024-05-10 RX ADMIN — MEROPENEM 500 MG: 500 INJECTION INTRAVENOUS at 12:05

## 2024-05-10 RX ADMIN — CLOPIDOGREL BISULFATE 75 MG: 75 TABLET ORAL at 08:05

## 2024-05-10 RX ADMIN — OXYCODONE 5 MG: 5 TABLET ORAL at 06:05

## 2024-05-10 RX ADMIN — CEFTRIAXONE 1 G: 1 INJECTION, POWDER, FOR SOLUTION INTRAMUSCULAR; INTRAVENOUS at 04:05

## 2024-05-10 NOTE — ASSESSMENT & PLAN NOTE
- Vascular neurology consulted. Appreciate assistance   - CTH, MRI brain negative, Tox negative  - Delirium precautions   - Replace electrolytes to keep Mg > 2, Phos > 3, and K > 4  - Nephrology HD MWF, dialyzed 5/8/24    Appears to be functionally declining slowly. Unclear true baseline. Vascular neurology signed off.   Possibly acute exacerbation with UTI/sepsis. Continue antibiotics through 5/13 and end of 5/14

## 2024-05-10 NOTE — ASSESSMENT & PLAN NOTE
[id number] Score: 3    6 High Risk:18.2% if no warfarin  5 High Risk: 12.5% if no warfarin  4 High Risk: 8.5% if no warfarin  3 High Risk: 5.9% if no warfarin  2 Intermediate Risk: 4% if no warfarin  1 Intermediate Risk: 2.8% if no warfarin    -- Continue home Coreg   -- Resumed home eliquis.   -- PT/INR   -- Trend CBCs  -- Continuous telemetry   -- EKG for acute changes   -- Keep Mg > 2, Phos > 3, and K > 4

## 2024-05-10 NOTE — SUBJECTIVE & OBJECTIVE
Interval History/Significant Events: No desaturations overnight, still has a sore throat but notes improvement.  More alert and awake today.     Review of Systems   Constitutional:  Negative for fatigue and fever.   HENT:  Positive for sore throat.    Cardiovascular:  Negative for chest pain.   Gastrointestinal:  Negative for abdominal pain and nausea.   Genitourinary:  Negative for flank pain.   Musculoskeletal:  Negative for back pain.     Objective:     Vital Signs (Most Recent):  Temp: 98.1 °F (36.7 °C) (05/10/24 1000)  Pulse: 74 (05/10/24 1000)  Resp: 14 (05/10/24 1000)  BP: 132/68 (05/10/24 1000)  SpO2: 100 % (05/10/24 1000) Vital Signs (24h Range):  Temp:  [97.2 °F (36.2 °C)-98.1 °F (36.7 °C)] 98.1 °F (36.7 °C)  Pulse:  [74-88] 74  Resp:  [9-27] 14  SpO2:  [92 %-100 %] 100 %  BP: (116-159)/(55-97) 132/68   Weight: 67.5 kg (148 lb 13 oz)  Body mass index is 45.41 kg/m².      Intake/Output Summary (Last 24 hours) at 5/10/2024 1053  Last data filed at 5/10/2024 0710  Gross per 24 hour   Intake 1344.64 ml   Output 2589 ml   Net -1244.36 ml          Physical Exam  Vitals and nursing note reviewed.   Constitutional:       Appearance: She is well-developed.   HENT:      Head: Normocephalic.      Comments: Mucous membranes appear dry     Mouth/Throat:      Mouth: Mucous membranes are moist.   Eyes:      Pupils: Pupils are equal, round, and reactive to light.   Cardiovascular:      Rate and Rhythm: Normal rate and regular rhythm.      Pulses: Normal pulses.   Pulmonary:      Effort: Pulmonary effort is normal.   Abdominal:      General: Bowel sounds are normal. There is no distension.      Palpations: Abdomen is soft.      Tenderness: There is no abdominal tenderness.   Musculoskeletal:         General: No swelling.      Comments: BLAKA   Skin:     General: Skin is warm and dry.   Neurological:      Mental Status: She is alert.            Vents:  Vent Mode: Spont (05/08/24 0815)  Ventilator Initiated: Yes (05/07/24  2114)  Set Rate: 18 BPM (05/08/24 0815)  Vt Set: 350 mL (05/08/24 0815)  Pressure Support: 10 cmH20 (05/08/24 0815)  PEEP/CPAP: 5 cmH20 (05/08/24 0815)  Oxygen Concentration (%): 30 (05/08/24 0900)  Peak Airway Pressure: 16 cmH20 (05/08/24 0815)  Plateau Pressure: 0 cmH20 (05/08/24 0815)  Total Ve: 7.4 L/m (05/08/24 0815)  Negative Inspiratory Force (cm H2O): 0 (05/08/24 0815)  F/VT Ratio<105 (RSBI): 114.68 (05/08/24 0815)  Lines/Drains/Airways       Central Venous Catheter Line  Duration                  Hemodialysis Catheter 05/25/23 1336 left internal jugular 350 days              Drain  Duration                  Urethral Catheter 05/07/24 3 days              Peripheral Intravenous Line  Duration                  Peripheral IV - Single Lumen 05/07/24 20 G Distal;Posterior;Right Forearm 3 days         Peripheral IV - Single Lumen 05/07/24 2100 20 G Anterior;Right Hand 2 days                  Significant Labs:    CBC/Anemia Profile:  Recent Labs   Lab 05/09/24  0408 05/10/24  0509   WBC 7.12 6.44   HGB 10.8* 11.2*   HCT 32.7* 35.4*    186   MCV 97 98   RDW 17.2* 17.2*        Chemistries:  Recent Labs   Lab 05/09/24  0408 05/10/24  0509    136   K 4.8 3.9    105   CO2 24 23   BUN 62* 27*   CREATININE 7.9* 3.9*   CALCIUM 9.0 9.7   ALBUMIN 2.2* 2.5*   PROT 7.4 8.5*   BILITOT 0.9 0.7   ALKPHOS 129 170*   ALT 8* 9*   AST 18 26   MG 2.3 2.0   PHOS 5.7* 2.8       Recent Lab Results  (Last 5 results in the past 24 hours)        05/10/24  0839   05/10/24  0512   05/10/24  0509   05/10/24  0053   05/09/24  1657        Albumin     2.5           ALP     170           ALT     9           Anion Gap     8           AST     26           Baso #     0.06           Basophil %     0.9           BILIRUBIN TOTAL     0.7  Comment: For infants and newborns, interpretation of results should be based  on gestational age, weight and in agreement with clinical  observations.    Premature Infant recommended reference  ranges:  Up to 24 hours.............<8.0 mg/dL  Up to 48 hours............<12.0 mg/dL  3-5 days..................<15.0 mg/dL  6-29 days.................<15.0 mg/dL             BUN     27           Calcium     9.7           Chloride     105           CO2     23           Creatinine     3.9           Differential Method     Automated           eGFR     12.8           Eos #     0.2           Eos %     2.3           Glucose     116           Gran # (ANC)     4.8           Gran %     74.3           Hematocrit     35.4           Hemoglobin     11.2           Hepatitis B Surface Ag     Non-reactive           Immature Grans (Abs)     0.02  Comment: Mild elevation in immature granulocytes is non specific and   can be seen in a variety of conditions including stress response,   acute inflammation, trauma and pregnancy. Correlation with other   laboratory and clinical findings is essential.             Immature Granulocytes     0.3           Lymph #     0.7           Lymph %     10.7           Magnesium      2.0           MCH     30.9           MCHC     31.6           MCV     98           Mono #     0.7           Mono %     11.5           MPV     11.4           nRBC     0           Phosphorus Level     2.8           Platelet Count     186           POCT Glucose 107   109     168   130       Potassium     3.9           PROTEIN TOTAL     8.5           RBC     3.63           RDW     17.2           Sodium     136           WBC     6.44                                All pertinent labs within the past 24 hours have been reviewed.    Significant Imaging:  I have reviewed all pertinent imaging results/findings within the past 24 hours.

## 2024-05-10 NOTE — ASSESSMENT & PLAN NOTE
Recent Labs   Lab 05/10/24  0509   WBC 6.44   RBC 3.63*   HGB 11.2*   HCT 35.4*      MCV 98   MCH 30.9   MCHC 31.6*       -- Type and screen   -- CBC daily   -- Transfuse for hgb < 7

## 2024-05-10 NOTE — ASSESSMENT & PLAN NOTE
-- Vascular neurology consulted. Appreciate assistance   -- CTH, MRI brain negative, Tox negative  -- Delirium precautions   -- Replace electrolytes to keep Mg > 2, Phos > 3, and K > 4  -- Nephrology HD MWF, dialyzed 5/8/24

## 2024-05-10 NOTE — NURSING TRANSFER
Nursing Transfer Note      5/10/2024   11:15 AM    Nurse giving handoff:Jyothi BRIGHT  Nurse receiving handoff:Chelita BRIGHT    Reason patient is being transferred: step down    Transfer From: MICU to 2    Transfer via bed    Transfer with cardiac monitoring    Transported by RN and PCT Jamie    Transfer Vital Signs:  Stable per MAR    Telemetry: Box Number [id number]  Order for Tele Monitor? Yes    Additional Lines: Kerns Catheter    Medicines sent: insulin pen, with patient label    Patient belongings transferred with patient: Yes  Wheelchair and clothing    Chart send with patient: Yes    Notified: spouse  Randell at bedside

## 2024-05-10 NOTE — ASSESSMENT & PLAN NOTE
This patient does have evidence of infective focus  My overall impression is sepsis.  Source: Urinary Tract    Antibiotics given-   Antibiotics (72h ago, onward)      Start     Stop Route Frequency Ordered    05/09/24 0100  meropenem (MERREM) 500 mg in sodium chloride 0.9 % 100 mL IVPB (MB+)         -- IV Every 12 hours (non-standard times) 05/08/24 1135          Latest lactate reviewed-  Recent Labs   Lab 05/07/24 2104 05/07/24  2125   LACTATE 1.3  --    POCLAC  --  1.42       Organ dysfunction indicated by Encephalopathy    Fluid challenge Contraindicated- Fluid bolus is contraindicated in this patient due to End Stage Renal Disease     Post- resuscitation assessment No - Post resuscitation assessment not needed     Will Not start Pressors- Levophed for MAP of 65  Source control achieved by:     -- Continue BSA   -- Follow up infectious work up: UCx with E Coli, continuing meropenem until ID recs  -- MAP > 65

## 2024-05-10 NOTE — TREATMENT PLAN
Hospital Medicine Treatment Plan Note:    Ms. Suyapa Connelly is a 57 y/o F with hx of severe PVD ultimately requiring bilateral LE AKAs, ESRD on HD, IDDM2, CAD s/p CABG, Afib on eliquis and debility who initially presented to the ED via EMS on 5/7 after an episode of unresponsiveness at home. Patient was apparently at her mental baseline on the morning of 5/7, when she suddenly became unresponsive per . EMS was called and were unable to arouse patient. She was brought into the ED and intubated shortly after arrival. It appears patient had declined over the past year to the extent that she is unable to perform independent ADLs. In the ED, patient afebrile, SBP 120s, HR 80s, intubated at 40% FiO2 Peep 5. CBC grossly unchanged from baseline, no leukocytosis or worsening anemia. CMP without significant electrolyte abnormalities, BUN 59, BGL 130s. Lactic wnl. UA c/f infection with >100 wbc and many bacteria. Empirically started on vanc and meropenem. UDS pan-negative. pH wnl and no hypercarbia on VBG. Patient admitted to MICU for continued management on 5/7. Patient was subsequently extubated the next day. Nephrology consulted on admission for HD (missed Monday 5/6). ID consulted and discontinued vanc. Meropenem continued until 5/10 when urine culture resulted with pan-sensitive E coli. Stepped down to medicine on 5/10 and antibiotics de-escalated to Rocephin monotherapy through 5/14. (Oral cefpodoxime 200 mg QD- renally dosed is an option at [place] if needed). Blood cultures from admission with NGTD. Reevaluated known HFrEF with repeat Echo with similar EF of 15%. SLP evaluated her for dysphagia which is progressively improving, on diabetic diet at time of step down. PT/OT following for early mobility and rehabilitation evaluation. Pending SNF placement.     Plan:   - Will continue IV CTX while in house  - Pending placement to SNF  - Continue HD with nephrology   - Clarify role/utility of snow in setting of UTI in  this patient with ESRD; message to nephrology, awaiting response     Kaitlyn Otero DO  Hospitalist

## 2024-05-10 NOTE — HOSPITAL COURSE
Ms. Suyapa Connelly is a 59 y/o F with hx of severe PVD ultimately requiring bilateral LE AKAs, ESRD on HD, IDDM2, CAD s/p CABG, Afib on eliquis and debility who initially presented to the ED via EMS on 5/7 after an episode of unresponsiveness at home. Patient was apparently at her mental baseline on the morning of 5/7, when she suddenly became unresponsive per . EMS was called and were unable to arouse patient. She was brought into the ED and intubated shortly after arrival. It appears patient had declined over the past year to the extent that she is unable to perform independent ADLs. In the ED, patient afebrile, SBP 120s, HR 80s, intubated at 40% FiO2 Peep 5. CBC grossly unchanged from baseline, no leukocytosis or worsening anemia. CMP without significant electrolyte abnormalities, BUN 59, BGL 130s. Lactic wnl. UA c/f infection with >100 wbc and many bacteria. Empirically started on vanc and meropenem. UDS pan-negative. pH wnl and no hypercarbia on VBG. Patient admitted to MICU for continued management on 5/7. Patient was subsequently extubated the next day. Nephrology consulted on admission for HD (missed Monday 5/6). ID consulted and discontinued vanc. Meropenem continued until 5/10 when urine culture resulted with pan-sensitive E coli. Stepped down to medicine on 5/10 and antibiotics de-escalated to Rocephin monotherapy through 5/14. (Oral cefpodoxime 200 mg QD- renally dosed is an option at [place] if needed). Blood cultures from admission with NGTD. Reevaluated known HFrEF with repeat Echo with similar EF of 15%. SLP evaluated her for dysphagia which is progressively improving, on diabetic diet at time of step down. PT/OT following for early mobility and rehabilitation evaluation.    Pending SNF placement.

## 2024-05-10 NOTE — HPI
Ms. Suyapa Connelly is a 59 y/o F with hx of severe PVD ultimately requiring bilateral LE AKAs, ESRD on HD, IDDM2, CAD s/p CABG, Afib on eliquis, debility who initially presented to the ED via EMS after an episode of unresponsiveness at home. Patient was apparently at her mental baseline this morning, when she suddenly became unresponsive per . EMS was called and were unable to arouse patient. She was brought into the ED and intubated shortly after arrival. Of note, patient has had 6 different hospitalizations over the past year related to encephalopathy. Patient was most recently admitted 3/22-3/23/24 for evaluation of steady cognitive decline over the past year. It appears patient had declined over the past year to the extent that she is unable to perform independent ADLs.  not ready for NH placement as of yet.     In the ED, patient afebrile, SBP 120s, HR 80s, intubated at 40% FiO2 Peep 5. CBC grossly unchanged from baseline, no leukocytosis or worsening anemia. CMP without significant electrolyte abnormalities, BUN 59, BGL 130s. Lactic wnl. UA c/f infection with >100 wbc and many bacteria. UDS pan-negative. pH wnl and no hypercarbia on VBG. Patient admitted to MICU for continued management.      Physical Therapy    Patient not seen in therapy.     Unavailable due to request no therapy today.      Re-attempt plan: tomorrow or per established plan of care    Patient  declined to work with physical therapy or get out of bed this morning . She kept saying 'just leave me alone\" in spite of education & encouragement to work with physical therapy. nurse aware & Physical therapy  to attempt to work with patient  tomorrow/per established plan of care.        OBJECTIVE                      Documented in the chart in the following areas: Assessment/Plan.        Therapy procedure time and total treatment time can be found documented on the Time Entry flowsheet

## 2024-05-10 NOTE — ASSESSMENT & PLAN NOTE
Nutrition consulted. Most recent weight and BMI monitored-     Measurements:  Wt Readings from Last 1 Encounters:   05/10/24 67.5 kg (148 lb 13 oz)   Body mass index is 45.41 kg/m².    Patient has been screened and assessed by RD.    Malnutrition Type:  Context: acute illness or injury  Level: moderate    Malnutrition Characteristic Summary:  Weight Loss (Malnutrition): 10% in 6 months  Subcutaneous Fat (Malnutrition): mild depletion  Muscle Mass (Malnutrition): mild depletion    Interventions/Recommendations (treatment strategy):  1.

## 2024-05-10 NOTE — ASSESSMENT & PLAN NOTE
- Inpatient glucose goal 140-180  - Most Recent HbA1c: 8.0 in 4/2024   - MDSSI   - Glucose checks POCT qACHS ordered

## 2024-05-10 NOTE — PROGRESS NOTES
Bedside hemodialysis treatment 1:1 started per MD orders via L chest wall permacath accessed using aseptic technique. VS stable to start tx. Primary nurse aware of initiation of HD.

## 2024-05-10 NOTE — NURSING
Nurses Note -- 4 Eyes      5/10/2024   4:04 PM      Skin assessed during: Transfer      [] No Altered Skin Integrity Present    []Prevention Measures Documented      [x] Yes- Altered Skin Integrity Present or Discovered   [x] LDA Added if Not in Epic (Describe Wound)   [x] New Altered Skin Integrity was Present on Admit and Documented in LDA   [x] Wound Image Taken    Wound Care Consulted? Yes    Attending Nurse:  Adina Lin RN/Staff Member:  DEANGELO Brooks

## 2024-05-10 NOTE — PROGRESS NOTES
[facility] Surg  Infectious Disease  Progress Note    Patient Name: Suyapa Connelly  MRN: [id number]  Admission Date: 5/7/2024  Length of Stay: 3 days  Attending Physician: Onofre Spann MD  Primary Care Provider: Magen Christensen MD    Isolation Status: No active isolations  Assessment/Plan:      Neuro  Encephalopathy  58 F h/o severe PVD s/p b/l LE AKA, ESRD on HD, IDDM2, CAD s/p CABG, Afib (on eliquis) who presented after being found unresponsive at home. She has 3 peripheral IV, LIJ HD catheter placed 1 year ago.   MRI brain without acute abnormality, simialry with CTA stroke.  Prior urine cx from 12/02 have grown proteus mirabilis (pan sensitive) and urine cx from 3/11 grew ESBL Klebsiella pneumoniae. AF, HR 70-80, -130/50-60s, on 30% FiO2/5.  Labs without leukocytosis WBC of 5.25 on admission, and 6 today. Lactic acid 1.3. UDS negative. UA 3+ leukocytes, >100 WBC., many bacteria and WBC clumping    Infectious Disease consulted for prior h/o ESBL (recurrent UTI) admitted for AMS and urosepsis, started on meropenem. Prior urine cx from 12/02/23  have grown proteus mirabilis (pan sensitive) amd urine cx from 3/11/22 grew ESBL Klebsiella pneumoniae. Worsening encephalopathy could be 2/2 UTI with concomitant failure to thrive as noted during prior hospitalizations. Low suspicion for CNS infection at this time.     Blood cultures from 5/7 with NGTD. Urine Cx from 5/7 with GNR, E coli sensitive to CTX     Recommendations:   - discontinue meropenem (started on 05/07)  - start CTX to complete a total of 7 day course (EED 05/13), if to be discharged prior to completion of 7 day course, can swich to PO cefpodixime 200 mg QD (renally dosed)     Discussed with primary, we will sign off.         Thank you for your consult. I will sign off. Please contact us if you have any additional questions.    Seema Dye MD  Infectious Disease  [facility] - Med Surg    Subjective:     Principal Problem:Severe  sepsis    HPI: Ms. Suyapa Connelly is a 59 yo female with a h/o severe PVD s/p b/l LE AKA, ESRD on HD, IDDM2, CAD s/p CABG, Afib (on eliquis) who presented after being found unresponsive at home. She has had recurrent hospitalization this past year d/t encephlopathy. Most recently on 3/22-3/23 due to progressive congitive decline. Admitted to MICU d/t requiring intubation for airway protection. Infectious Disease  consulted for prior h/o ESBL (recurrent UTI) admitted for AMS and urosepsis, started on meropenem.    She has been AF, HR 70-80, -130/50-60s, initially intubated on 30% FiO2/ PEEP 5, now extubated on RA. Labs without leukocytosis WBC of 5.25 on admission, and 6 today. Lactic acid 1.3. UDS negative. UA 3+ leukocytes, >100 WBC., many bacteria and WBC clumping. MRI brain without acute abnormality, simialry with CTA stroke. CXR with no acute cardiopulmonary process. Blood cultures from 5/7 thus far with NGTD, urine cx in process. Started on meropenem. She has 3 peripheral IV, LIJ HD catheter placed 1 year ago.     Upon evaluation, patient was AAOX1, to self only. Prior urine cx from 12/02  have grown proteus mirabilis (pan sensitive) amd urine cx from 3/11 grew ESBL Klebsiella pneumoniae. Notably, has a Penicillin allergy causing a rash; tolerated ceftriaxone on 1/13/20 and has itching with ciprofloxacin   Interval History: NAOE. Patient extubated and on RA. Patient's mentation significant improved. Urine Cx with E coli sensitive to CTX.     Review of Systems   Constitutional:  Negative for fatigue and fever.   HENT:  Positive for sore throat.    Cardiovascular:  Negative for chest pain.   Gastrointestinal:  Negative for abdominal pain and nausea.   Genitourinary:  Negative for flank pain.   Musculoskeletal:  Negative for back pain.     Objective:     Vital Signs (Most Recent):  Temp: 98.2 °F (36.8 °C) (05/10/24 1100)  Pulse: 70 (05/10/24 1100)  Resp: 14 (05/10/24 1100)  BP: (!) 128/58 (05/10/24  1100)  SpO2: 100 % (05/10/24 1100) Vital Signs (24h Range):  Temp:  [97.2 °F (36.2 °C)-98.2 °F (36.8 °C)] 98.2 °F (36.8 °C)  Pulse:  [70-88] 70  Resp:  [9-27] 14  SpO2:  [92 %-100 %] 100 %  BP: (116-159)/(55-97) 128/58     Weight: 67.5 kg (148 lb 13 oz)  Body mass index is 45.41 kg/m².    Estimated Creatinine Clearance: 11.7 mL/min (A) (based on SCr of 3.9 mg/dL (H)).     Physical Exam  Constitutional:       Appearance: She is not ill-appearing.   HENT:      Head: Normocephalic and atraumatic.      Mouth/Throat:      Mouth: Mucous membranes are moist.   Eyes:      Extraocular Movements: Extraocular movements intact.      Conjunctiva/sclera: Conjunctivae normal.   Cardiovascular:      Rate and Rhythm: Normal rate and regular rhythm.      Heart sounds: Normal heart sounds.      Comments: Has left sided tunnel catheter placed   Pulmonary:      Effort: Pulmonary effort is normal. No respiratory distress.      Breath sounds: Normal breath sounds.   Abdominal:      General: Abdomen is flat. Bowel sounds are normal. There is no distension.      Palpations: Abdomen is soft.      Tenderness: There is no abdominal tenderness. There is no guarding.   Genitourinary:     Comments: Kerns placed  Musculoskeletal:      Comments: B/l AKA amputation, no increased erythema noted    Skin:     General: Skin is warm.   Neurological:      Mental Status: She is oriented to person, place, and time. She is lethargic.          Significant Labs: All pertinent labs within the past 24 hours have been reviewed.    Significant Imaging: I have reviewed all pertinent imaging results/findings within the past 24 hours.

## 2024-05-10 NOTE — ASSESSMENT & PLAN NOTE
Nutrition consulted. Most recent weight and BMI monitored-     Measurements:  Wt Readings from Last 1 Encounters:   05/10/24 67.5 kg (148 lb 13 oz)   Body mass index is 45.41 kg/m².    Patient has been screened and assessed by RD.    Malnutrition Type:  Context: acute illness or injury  Level: moderate    Malnutrition Characteristic Summary:  Weight Loss (Malnutrition): 10% in 6 months  Subcutaneous Fat (Malnutrition): mild depletion  Muscle Mass (Malnutrition): mild depletion    Interventions/Recommendations (treatment strategy):  1.  Nutrition consulted, dysphagia progressing per SLP   - F/u SLP recs

## 2024-05-10 NOTE — SUBJECTIVE & OBJECTIVE
Interval History: HD completed this morning, tolerated well. Net UF 2L. Plan for stepdown.     Review of patient's allergies indicates:   Allergen Reactions    Ciprofloxacin Itching    Contrast media      Kidney injury    Iodine      Kidney injury    Pcn [penicillins]      Rash; tolerated ceftriaxone on 1/13/20     Current Facility-Administered Medications   Medication Frequency    allopurinol split tablet 50 mg Every other day    apixaban tablet 5 mg BID    atorvastatin tablet 80 mg Daily    carvediloL tablet 6.25 mg BID    clopidogreL tablet 75 mg Daily    dextrose 10% bolus 125 mL 125 mL PRN    dextrose 10% bolus 250 mL 250 mL PRN    [START ON 5/11/2024] famotidine tablet 20 mg Daily    glucagon (human recombinant) injection 1 mg PRN    insulin aspart U-100 pen 0-10 Units Q6H PRN    meropenem (MERREM) 500 mg in sodium chloride 0.9 % 100 mL IVPB (MB+) Q12H    oxyCODONE immediate release tablet 5 mg Q6H PRN    QUEtiapine tablet 25 mg Daily PRN    sertraline tablet 100 mg Daily    sodium chloride 0.9% flush 10 mL PRN    trazodone split tablet 25 mg QHS       Objective:     Vital Signs (Most Recent):  Temp: 98.2 °F (36.8 °C) (05/10/24 1100)  Pulse: 70 (05/10/24 1100)  Resp: 14 (05/10/24 1100)  BP: (!) 128/58 (05/10/24 1100)  SpO2: 100 % (05/10/24 1100) Vital Signs (24h Range):  Temp:  [97.2 °F (36.2 °C)-98.2 °F (36.8 °C)] 98.2 °F (36.8 °C)  Pulse:  [70-88] 70  Resp:  [9-27] 14  SpO2:  [92 %-100 %] 100 %  BP: (116-159)/(55-97) 128/58     Weight: 67.5 kg (148 lb 13 oz) (05/10/24 0510)  Body mass index is 45.41 kg/m².  Body surface area is 1.51 meters squared.    I/O last 3 completed shifts:  In: 1321.7 [P.O.:462; I.V.:146.1; Other:500; IV Piggyback:213.6]  Out: 2704 [Urine:204; Other:2500]     Physical Exam     Significant Labs:  CBC:   Recent Labs   Lab 05/10/24  0509   WBC 6.44   RBC 3.63*   HGB 11.2*   HCT 35.4*      MCV 98   MCH 30.9   MCHC 31.6*     CMP:   Recent Labs   Lab 05/10/24  0509   *    CALCIUM 9.7   ALBUMIN 2.5*   PROT 8.5*      K 3.9   CO2 23      BUN 27*   CREATININE 3.9*   ALKPHOS 170*   ALT 9*   AST 26   BILITOT 0.7     All labs within the past 24 hours have been reviewed.

## 2024-05-10 NOTE — SUBJECTIVE & OBJECTIVE
Interval History:     Stepped down to medicine yesterday afternoon. Altered. She is oriented to self only. No complaints. Ate breakfast.     Review of Systems   Unable to perform ROS: Acuity of condition     Objective:     Vital Signs (Most Recent):  Temp: 97.1 °F (36.2 °C) (05/10/24 1208)  Pulse: 76 (05/10/24 1208)  Resp: 16 (05/10/24 1208)  BP: (!) 106/58 (05/10/24 1208)  SpO2: (!) 94 % (05/10/24 1208) Vital Signs (24h Range):  Temp:  [97.1 °F (36.2 °C)-98.2 °F (36.8 °C)] 97.1 °F (36.2 °C)  Pulse:  [70-88] 76  Resp:  [9-25] 16  SpO2:  [92 %-100 %] 94 %  BP: (106-159)/(58-97) 106/58     Weight: 67.5 kg (148 lb 13 oz)  Body mass index is 45.41 kg/m².    Estimated Creatinine Clearance: 11.7 mL/min (A) (based on SCr of 3.9 mg/dL (H)).     Physical Exam  Vitals reviewed.   Constitutional:       Appearance: She is not ill-appearing.   HENT:      Head: Normocephalic and atraumatic.      Mouth/Throat:      Mouth: Mucous membranes are moist.   Eyes:      Extraocular Movements: Extraocular movements intact.      Conjunctiva/sclera: Conjunctivae normal.   Cardiovascular:      Rate and Rhythm: Normal rate.      Pulses: Normal pulses.      Comments: Has left sided tunnel catheter placed   Pulmonary:      Effort: Pulmonary effort is normal. No respiratory distress.      Breath sounds: Normal breath sounds.   Abdominal:      General: Abdomen is flat.      Palpations: Abdomen is soft.   Genitourinary:     Comments: Kerns placed  Musculoskeletal:      Comments: B/l AKA amputation, no increased erythema noted    Skin:     General: Skin is warm.      Comments: Some areas of mild superficial skin breakdown on buttocks    Neurological:      Mental Status: She is alert.          Significant Labs: All pertinent labs within the past 24 hours have been reviewed.    Significant Imaging: I have reviewed all pertinent imaging results/findings within the past 24 hours.

## 2024-05-10 NOTE — ASSESSMENT & PLAN NOTE
58 F h/o severe PVD s/p b/l LE AKA, ESRD on HD, IDDM2, CAD s/p CABG, Afib (on eliquis) who presented after being found unresponsive at home. She has 3 peripheral IV, LIJ HD catheter placed 1 year ago.   MRI brain without acute abnormality, simialry with CTA stroke.  Prior urine cx from 12/02 have grown proteus mirabilis (pan sensitive) and urine cx from 3/11 grew ESBL Klebsiella pneumoniae. AF, HR 70-80, -130/50-60s, on 30% FiO2/5.  Labs without leukocytosis WBC of 5.25 on admission, and 6 today. Lactic acid 1.3. UDS negative. UA 3+ leukocytes, >100 WBC., many bacteria and WBC clumping    Infectious Disease consulted for prior h/o ESBL (recurrent UTI) admitted for AMS and urosepsis, started on meropenem. Prior urine cx from 12/02/23  have grown proteus mirabilis (pan sensitive) amd urine cx from 3/11/22 grew ESBL Klebsiella pneumoniae. Worsening encephalopathy could be 2/2 UTI with concomitant failure to thrive as noted during prior hospitalizations. Low suspicion for CNS infection at this time.     Blood cultures from 5/7 with NGTD. Urine Cx from 5/7 with GNR, E coli sensitive to CTX     Recommendations:   - discontinue meropenem (started on 05/07)  - start CTX to complete a total of 7 day course (EED 05/13), if to be discharged prior to completion of 7 day course, can swich to PO cefpodixime 200 mg QD (renally dosed)     Discussed with primary, we will sign off.

## 2024-05-10 NOTE — ASSESSMENT & PLAN NOTE
Patient's FSGs are controlled on current medication regimen.  Last A1c reviewed-   Lab Results   Component Value Date    HGBA1C 8.0 (H) 04/24/2024     Most recent fingerstick glucose reviewed-   Recent Labs   Lab 05/09/24  1657 05/10/24  0053 05/10/24  0512 05/10/24  0839   POCTGLUCOSE 130* 168* 109 107       Current correctional scale  Low  Maintain anti-hyperglycemic dose as follows-   Antihyperglycemics (From admission, onward)    Start     Stop Route Frequency Ordered    05/07/24 2347  insulin aspart U-100 pen 0-10 Units         -- SubQ Every 6 hours PRN 05/07/24 2247        Hold Oral hypoglycemics while patient is in the hospital.

## 2024-05-10 NOTE — ASSESSMENT & PLAN NOTE
ESRD on iHD MWF  Dr. Jesika MATA TDC  + residual renal fx    Missed HD session on Monday prior to presentation.    Plan/Recommendations:    -Continue MWD iHD schedule, next HD  5/13  -strict I/O's  -Renal diet when advanced  -phos WNL, will monitor for now.  No need for phos binders at this time  -Hgb @ goal for ESRD, continue trending.  No need for NAEEM therapy.

## 2024-05-10 NOTE — PT/OT/SLP PROGRESS
"Speech Language Pathology Treatment    Patient Name:  Suyapa Connelly   MRN:  [id number]  Admitting Diagnosis: Severe sepsis    Recommendations:                 General Recommendations:  Dysphagia therapy  Diet recommendations:  Regular Diet - IDDSI Level 7, Liquid Diet Level: Thin liquids - IDDSI Level 0   Aspiration Precautions: Assistance with meals, Eliminate distractions, Feed only when awake/alert, HOB to 90 degrees, Meds whole 1 at a time, and Strict aspiration precautions   General Precautions: Standard, aspiration, fall  Communication strategies:  none    Assessment:     Suyapa Connelly is a 58 y.o. female with an SLP diagnosis of improving dysphagia. She presents with ongoing increased levels of alertness and tolerance of advanced PO trials. Pt appears safe for upgrade to unrestricted diet with ongoing following to ensure continued diet tolerance. Pt is at low risk for development of aspiration-related complications given immunocompetence and adequate oral health status despite decreased functional mobility. SLP to continue to follow.     Subjective     Spoke with nursing prior to session. Pt found resting in bed with spouse at bedside upon SLP entry into room. Pt agreeable to participate in all aspects of session.      Patient goals: to eat solid foods     Pain/Comfort:  Pain Rating 1: 0/10    Respiratory Status: Room air    Objective:     Has the patient been evaluated by SLP for swallowing?   Yes  Keep patient NPO? No   Current Respiratory Status:        Pt seen for ongoing dysphagia therapy. Pt with continued improvements in levels of alertness as she was able to sustain wakeful state throughout session and participate in complex conversations regarding recent events. Spouse at bedside reported pt was "getting there" and noted that she consumed about half of pureed breakfast tray. HOB elevated for all oral intake. Trials of thin liquids via straw sip x6 and tsp bites of pudding x3 tolerated without " difficulty. Given square inch kranthi cracker pieces, pt with slightly increased mastication and MIN oral residue which was eliminated with cued liquid wash. Pt able to alternate bites and sips to consistently clear oral residual for all additional regular solid trials. SLP provided education regarding overall impressions, regular diet with thin liquids, safe swallow strategies, and ongoing SLP POC. Pt and spouse verbalized understanding and had no additional questions or concerns upon SLP exit. Whiteboard updated. Spoke with RN and medical team regarding impressions and recommendations and all verbalized understanding.       Goals:   Multidisciplinary Problems       SLP Goals          Problem: SLP    Goal Priority Disciplines Outcome   SLP Goal     SLP Progressing   Description: Speech Pathology Goals  To be met by 5/22/24    1. Pt will participate in ongoing diagnostic dysphagia therapy                               Plan:     Patient to be seen:  4 x/week   Plan of Care expires:  06/07/24  Plan of Care reviewed with:  patient, spouse   SLP Follow-Up:  Yes       Discharge recommendations:  Low Intensity Therapy   Barriers to Discharge:  Level of Skilled Assistance Needed      Time Tracking:     SLP Treatment Date:   05/10/24  Speech Start Time:  0916  Speech Stop Time:  0935     Speech Total Time (min):  19 min    Billable Minutes: Treatment Swallowing Dysfunction 10 and Self Care/Home Management Training 9      05/10/2024

## 2024-05-10 NOTE — PROGRESS NOTES
Hemodialysis treatment completed. Blood returned. Dialyzed patient for 3 hours with net fluid removal of 2 L. Patient tolerated treatment well.    L chest wall permacath flushed and locked with saline. Lumens capped and wrapped in sterile gauze.     Report given to primary nurse

## 2024-05-10 NOTE — PLAN OF CARE
Lamonte completed PASRR faxed and awaiting 142.    3:57 PM  SW received 142 form and placed in [employer] system.    Sisi Sagastume LMSW  [facility]  [phone]

## 2024-05-10 NOTE — PROGRESS NOTES
[facility] - [facility]  Critical Care Medicine  Progress Note    Patient Name: Suyapa Connelly  MRN: [id number]  Admission Date: 5/7/2024  Hospital Length of Stay: 3 days  Code Status: Full Code  Attending Provider: Onofre Spann MD  Primary Care Provider: Magen Christensen MD   Principal Problem: Severe sepsis    Subjective:     HPI:  Ms. Suyapa Connelly is a 57 y/o F with hx of severe PVD ultimately requiring bilateral LE AKAs, ESRD on HD, IDDM2, CAD s/p CABG, Afib on eliquis, debility who initially presented to the ED via EMS after an episode of unresponsiveness at home. Patient was apparently at her mental baseline this morning, when she suddenly became unresponsive per . EMS was called and were unable to arouse patient. She was brought into the ED and intubated shortly after arrival. Of note, patient has had 6 different hospitalizations over the past year related to encephalopathy. Patient was most recently admitted 3/22-3/23/24 for evaluation of steady cognitive decline over the past year. It appears patient had declined over the past year to the extent that she is unable to perform independent ADLs.  not ready for NH placement as of yet.    In the ED, patient afebrile, SBP 120s, HR 80s, intubated at 40% FiO2 Peep 5. CBC grossly unchanged from baseline, no leukocytosis or worsening anemia. CMP without significant electrolyte abnormalities, BUN 59, BGL 130s. Lactic wnl. UA c/f infection with >100 wbc and many bacteria. UDS pan-negative. pH wnl and no hypercarbia on VBG. Patient admitted to MICU for continued management.     Hospital/ICU Course:  In the morning on reevaluation on SBT patient did well and she was extubated.  Nephrology consulted and HD completed.  ID consulted who recommended discontinuing IV Vancomycin and following up the urine culture results and just continuing with meropenem.  Reevaluated her known HFrEF with repeat Echo with similar EF of 15%.  SLP evaluated her  for dysphagia which is progressively improving. PT/OT following for early mobility and rehabilitation evaluation.     Interval History/Significant Events: No desaturations overnight, still has a sore throat but notes improvement.  More alert and awake today.     Review of Systems   Constitutional:  Negative for fatigue and fever.   HENT:  Positive for sore throat.    Cardiovascular:  Negative for chest pain.   Gastrointestinal:  Negative for abdominal pain and nausea.   Genitourinary:  Negative for flank pain.   Musculoskeletal:  Negative for back pain.     Objective:     Vital Signs (Most Recent):  Temp: 98.1 °F (36.7 °C) (05/10/24 1000)  Pulse: 74 (05/10/24 1000)  Resp: 14 (05/10/24 1000)  BP: 132/68 (05/10/24 1000)  SpO2: 100 % (05/10/24 1000) Vital Signs (24h Range):  Temp:  [97.2 °F (36.2 °C)-98.1 °F (36.7 °C)] 98.1 °F (36.7 °C)  Pulse:  [74-88] 74  Resp:  [9-27] 14  SpO2:  [92 %-100 %] 100 %  BP: (116-159)/(55-97) 132/68   Weight: 67.5 kg (148 lb 13 oz)  Body mass index is 45.41 kg/m².      Intake/Output Summary (Last 24 hours) at 5/10/2024 1053  Last data filed at 5/10/2024 0710  Gross per 24 hour   Intake 1344.64 ml   Output 2589 ml   Net -1244.36 ml          Physical Exam  Vitals and nursing note reviewed.   Constitutional:       Appearance: She is well-developed.   HENT:      Head: Normocephalic.      Comments: Mucous membranes appear dry     Mouth/Throat:      Mouth: Mucous membranes are moist.   Eyes:      Pupils: Pupils are equal, round, and reactive to light.   Cardiovascular:      Rate and Rhythm: Normal rate and regular rhythm.      Pulses: Normal pulses.   Pulmonary:      Effort: Pulmonary effort is normal.   Abdominal:      General: Bowel sounds are normal. There is no distension.      Palpations: Abdomen is soft.      Tenderness: There is no abdominal tenderness.   Musculoskeletal:         General: No swelling.      Comments: BLAKA   Skin:     General: Skin is warm and dry.   Neurological:       Mental Status: She is alert.            Vents:  Vent Mode: Spont (05/08/24 0815)  Ventilator Initiated: Yes (05/07/24 2114)  Set Rate: 18 BPM (05/08/24 0815)  Vt Set: 350 mL (05/08/24 0815)  Pressure Support: 10 cmH20 (05/08/24 0815)  PEEP/CPAP: 5 cmH20 (05/08/24 0815)  Oxygen Concentration (%): 30 (05/08/24 0900)  Peak Airway Pressure: 16 cmH20 (05/08/24 0815)  Plateau Pressure: 0 cmH20 (05/08/24 0815)  Total Ve: 7.4 L/m (05/08/24 0815)  Negative Inspiratory Force (cm H2O): 0 (05/08/24 0815)  F/VT Ratio<105 (RSBI): 114.68 (05/08/24 0815)  Lines/Drains/Airways       Central Venous Catheter Line  Duration                  Hemodialysis Catheter 05/25/23 1336 left internal jugular 350 days              Drain  Duration                  Urethral Catheter 05/07/24 3 days              Peripheral Intravenous Line  Duration                  Peripheral IV - Single Lumen 05/07/24 20 G Distal;Posterior;Right Forearm 3 days         Peripheral IV - Single Lumen 05/07/24 2100 20 G Anterior;Right Hand 2 days                  Significant Labs:    CBC/Anemia Profile:  Recent Labs   Lab 05/09/24  0408 05/10/24  0509   WBC 7.12 6.44   HGB 10.8* 11.2*   HCT 32.7* 35.4*    186   MCV 97 98   RDW 17.2* 17.2*        Chemistries:  Recent Labs   Lab 05/09/24  0408 05/10/24  0509    136   K 4.8 3.9    105   CO2 24 23   BUN 62* 27*   CREATININE 7.9* 3.9*   CALCIUM 9.0 9.7   ALBUMIN 2.2* 2.5*   PROT 7.4 8.5*   BILITOT 0.9 0.7   ALKPHOS 129 170*   ALT 8* 9*   AST 18 26   MG 2.3 2.0   PHOS 5.7* 2.8       Recent Lab Results  (Last 5 results in the past 24 hours)        05/10/24  0839   05/10/24  0512   05/10/24  0509   05/10/24  0053   05/09/24  1657        Albumin     2.5           ALP     170           ALT     9           Anion Gap     8           AST     26           Baso #     0.06           Basophil %     0.9           BILIRUBIN TOTAL     0.7  Comment: For infants and newborns, interpretation of results should be based  on  gestational age, weight and in agreement with clinical  observations.    Premature Infant recommended reference ranges:  Up to 24 hours.............<8.0 mg/dL  Up to 48 hours............<12.0 mg/dL  3-5 days..................<15.0 mg/dL  6-29 days.................<15.0 mg/dL             BUN     27           Calcium     9.7           Chloride     105           CO2     23           Creatinine     3.9           Differential Method     Automated           eGFR     12.8           Eos #     0.2           Eos %     2.3           Glucose     116           Gran # (ANC)     4.8           Gran %     74.3           Hematocrit     35.4           Hemoglobin     11.2           Hepatitis B Surface Ag     Non-reactive           Immature Grans (Abs)     0.02  Comment: Mild elevation in immature granulocytes is non specific and   can be seen in a variety of conditions including stress response,   acute inflammation, trauma and pregnancy. Correlation with other   laboratory and clinical findings is essential.             Immature Granulocytes     0.3           Lymph #     0.7           Lymph %     10.7           Magnesium      2.0           MCH     30.9           MCHC     31.6           MCV     98           Mono #     0.7           Mono %     11.5           MPV     11.4           nRBC     0           Phosphorus Level     2.8           Platelet Count     186           POCT Glucose 107   109     168   130       Potassium     3.9           PROTEIN TOTAL     8.5           RBC     3.63           RDW     17.2           Sodium     136           WBC     6.44                                All pertinent labs within the past 24 hours have been reviewed.    Significant Imaging:  I have reviewed all pertinent imaging results/findings within the past 24 hours.    ABG  Recent Labs   Lab 05/07/24 2124   PH 7.438   PO2 92*   PCO2 37.2   HCO3 25.1   BE 1     Assessment/Plan:     Neuro  Encephalopathy  -- Vascular neurology consulted. Appreciate  assistance   -- CTH, MRI brain negative, Tox negative  -- Delirium precautions   -- Replace electrolytes to keep Mg > 2, Phos > 3, and K > 4  -- Nephrology HD MWF, dialyzed 5/8/24    Psychiatric  CAROLIN (generalized anxiety disorder)  -- Resuming home sertraline and trazadone 5/8/24.    Pulmonary  On mechanically assisted ventilation  Intubated in the setting of AMS, extubated successfully on 5/8/24.    -- GI and DVT ppx      Cardiac/Vascular  Chronic combined systolic and diastolic heart failure  -- Continue GDMT    Paroxysmal atrial fibrillation  [id number] Score: 3    6 High Risk:18.2% if no warfarin  5 High Risk: 12.5% if no warfarin  4 High Risk: 8.5% if no warfarin  3 High Risk: 5.9% if no warfarin  2 Intermediate Risk: 4% if no warfarin  1 Intermediate Risk: 2.8% if no warfarin    -- Continue home Coreg   -- Resumed home eliquis.   -- PT/INR   -- Trend CBCs  -- Continuous telemetry   -- EKG for acute changes   -- Keep Mg > 2, Phos > 3, and K > 4      CAD (coronary artery disease)  -- Continue Plavix + Statin therapy    Peripheral vascular disease  -- Extensive PVD ultimately requiring bilateral AKA  -- Continue home lipitor and plavix while admitted     Renal/  ESRD (end stage renal disease)  -- Nephrology consulted [facility] dialysis  -- Renally dose all medications   -- Avoid nephrotoxins   -- Kerns w strict I&O   -- MAP > 65   -- Hgb > 7    ID  * Severe sepsis  This patient does have evidence of infective focus  My overall impression is sepsis.  Source: Urinary Tract    Antibiotics given-   Antibiotics (72h ago, onward)      Start     Stop Route Frequency Ordered    05/09/24 0100  meropenem (MERREM) 500 mg in sodium chloride 0.9 % 100 mL IVPB (MB+)         -- IV Every 12 hours (non-standard times) 05/08/24 1135          Latest lactate reviewed-  Recent Labs   Lab 05/07/24 2104 05/07/24  2125   LACTATE 1.3  --    POCLAC  --  1.42       Organ dysfunction indicated by Encephalopathy    Fluid challenge  Contraindicated- Fluid bolus is contraindicated in this patient due to End Stage Renal Disease     Post- resuscitation assessment No - Post resuscitation assessment not needed     Will Not start Pressors- Levophed for MAP of 65  Source control achieved by:     -- Continue BSA   -- Follow up infectious work up: UCx with E Coli, continuing meropenem until ID recs  -- MAP > 65    Oncology  Anemia due to chronic kidney disease, on chronic dialysis  Recent Labs   Lab 05/10/24  0509   WBC 6.44   RBC 3.63*   HGB 11.2*   HCT 35.4*      MCV 98   MCH 30.9   MCHC 31.6*       -- Type and screen   -- CBC daily   -- Transfuse for hgb < 7    Endocrine  Moderate malnutrition  Nutrition consulted. Most recent weight and BMI monitored-     Measurements:  Wt Readings from Last 1 Encounters:   05/10/24 67.5 kg (148 lb 13 oz)   Body mass index is 45.41 kg/m².    Patient has been screened and assessed by RD.    Malnutrition Type:  Context: acute illness or injury  Level: moderate    Malnutrition Characteristic Summary:  Weight Loss (Malnutrition): 10% in 6 months  Subcutaneous Fat (Malnutrition): mild depletion  Muscle Mass (Malnutrition): mild depletion    Interventions/Recommendations (treatment strategy):  1.  Nutrition consulted, dysphagia progressing per SLP   - F/u SLP recs     Type 2 diabetes mellitus with hyperglycemia, with long-term current use of insulin  Patient's FSGs are controlled on current medication regimen.  Last A1c reviewed-   Lab Results   Component Value Date    HGBA1C 8.0 (H) 04/24/2024     Most recent fingerstick glucose reviewed-   Recent Labs   Lab 05/09/24  1657 05/10/24  0053 05/10/24  0512 05/10/24  0839   POCTGLUCOSE 130* 168* 109 107       Current correctional scale  Low  Maintain anti-hyperglycemic dose as follows-   Antihyperglycemics (From admission, onward)      Start     Stop Route Frequency Ordered    05/07/24 2347  insulin aspart U-100 pen 0-10 Units         -- SubQ Every 6 hours PRN 05/07/24  2247          Hold Oral hypoglycemics while patient is in the hospital.      Other  Impaired functional mobility and endurance  -- PT / OT when clinically appropriate   -- Has previously declined nursing placement        Critical Care Daily Checklist:    A: Awake: RASS Goal/Actual Goal: RASS Goal: 0-->alert and calm  Actual:     B: Spontaneous Breathing Trial Performed? Spon. Breathing Trial Initiated?: Initiated (05/08/24 0815)   C: SAT & SBT Coordinated?  NA                      D: Delirium: CAM-ICU Overall CAM-ICU: Negative   E: Early Mobility Performed? Yes   F: Feeding Goal: Goals: Meet % EEN, EPN by RD f/u date  Status: Nutrition Goal Status: new   Current Diet Order   Procedures    Diet diabetic Cardiac (Low Na/Chol); Thin; 2000 Calorie; Fluid - 2000mL     Order Specific Question:   Additional Diet Options:     Answer:   Cardiac (Low Na/Chol)     Order Specific Question:   Fluid consistency:     Answer:   Thin     Order Specific Question:   Total calories:     Answer:   2000 Calorie     Order Specific Question:   Fluid restriction:     Answer:   Fluid - 2000mL      AS: Analgesia/Sedation NA   T: Thromboembolic Prophylaxis Eliquis   H: HOB > 300 Yes   U: Stress Ulcer Prophylaxis (if needed) Yes   G: Glucose Control Yes   B: Bowel Function     I: Indwelling Catheter (Lines & Kerns) Necessity NA   D: De-escalation of Antimicrobials/Pharmacotherapies Meropenem pending ID recs    Plan for the day/ETD SDU, SLP, ID recs    Code Status:  Family/Goals of Care: Full Code         Critical secondary to Patient has a condition that poses threat to life and bodily function: Severe Respiratory Distress      Critical care was time spent personally by me on the following activities: development of treatment plan with patient or surrogate and bedside caregivers, discussions with consultants, evaluation of patient's response to treatment, examination of patient, ordering and performing treatments and interventions,  ordering and review of laboratory studies, ordering and review of radiographic studies, pulse oximetry, re-evaluation of patient's condition. This critical care time did not overlap with that of any other provider or involve time for any procedures.     Naida Hernandez MD  Critical Care Medicine  [facility] - Cardiac Medical ICU

## 2024-05-10 NOTE — SUBJECTIVE & OBJECTIVE
Interval History: NAOE. Patient extubated and on RA. Patient's mentation significant improved. Urine Cx with E coli sensitive to CTX.     Review of Systems   Constitutional:  Negative for fatigue and fever.   HENT:  Positive for sore throat.    Cardiovascular:  Negative for chest pain.   Gastrointestinal:  Negative for abdominal pain and nausea.   Genitourinary:  Negative for flank pain.   Musculoskeletal:  Negative for back pain.     Objective:     Vital Signs (Most Recent):  Temp: 98.2 °F (36.8 °C) (05/10/24 1100)  Pulse: 70 (05/10/24 1100)  Resp: 14 (05/10/24 1100)  BP: (!) 128/58 (05/10/24 1100)  SpO2: 100 % (05/10/24 1100) Vital Signs (24h Range):  Temp:  [97.2 °F (36.2 °C)-98.2 °F (36.8 °C)] 98.2 °F (36.8 °C)  Pulse:  [70-88] 70  Resp:  [9-27] 14  SpO2:  [92 %-100 %] 100 %  BP: (116-159)/(55-97) 128/58     Weight: 67.5 kg (148 lb 13 oz)  Body mass index is 45.41 kg/m².    Estimated Creatinine Clearance: 11.7 mL/min (A) (based on SCr of 3.9 mg/dL (H)).     Physical Exam  Constitutional:       Appearance: She is not ill-appearing.   HENT:      Head: Normocephalic and atraumatic.      Mouth/Throat:      Mouth: Mucous membranes are moist.   Eyes:      Extraocular Movements: Extraocular movements intact.      Conjunctiva/sclera: Conjunctivae normal.   Cardiovascular:      Rate and Rhythm: Normal rate and regular rhythm.      Heart sounds: Normal heart sounds.      Comments: Has left sided tunnel catheter placed   Pulmonary:      Effort: Pulmonary effort is normal. No respiratory distress.      Breath sounds: Normal breath sounds.   Abdominal:      General: Abdomen is flat. Bowel sounds are normal. There is no distension.      Palpations: Abdomen is soft.      Tenderness: There is no abdominal tenderness. There is no guarding.   Genitourinary:     Comments: Kerns placed  Musculoskeletal:      Comments: B/l AKA amputation, no increased erythema noted    Skin:     General: Skin is warm.   Neurological:      Mental  Status: She is oriented to person, place, and time. She is lethargic.          Significant Labs: All pertinent labs within the past 24 hours have been reviewed.    Significant Imaging: I have reviewed all pertinent imaging results/findings within the past 24 hours.

## 2024-05-10 NOTE — PROGRESS NOTES
[facility]  Nephrology  Progress Note    Patient Name: Suyapa Connelly  MRN: [id number]  Admission Date: 5/7/2024  Hospital Length of Stay: 3 days  Attending Provider: Onofre Spann MD   Primary Care Physician: Magen Christensen MD  Principal Problem:Severe sepsis    Subjective:     Interval History: HD completed this morning, tolerated well. Net UF 2L. Plan for stepdown.     Review of patient's allergies indicates:   Allergen Reactions    Ciprofloxacin Itching    Contrast media      Kidney injury    Iodine      Kidney injury    Pcn [penicillins]      Rash; tolerated ceftriaxone on 1/13/20     Current Facility-Administered Medications   Medication Frequency    allopurinol split tablet 50 mg Every other day    apixaban tablet 5 mg BID    atorvastatin tablet 80 mg Daily    carvediloL tablet 6.25 mg BID    clopidogreL tablet 75 mg Daily    dextrose 10% bolus 125 mL 125 mL PRN    dextrose 10% bolus 250 mL 250 mL PRN    [START ON 5/11/2024] famotidine tablet 20 mg Daily    glucagon (human recombinant) injection 1 mg PRN    insulin aspart U-100 pen 0-10 Units Q6H PRN    meropenem (MERREM) 500 mg in sodium chloride 0.9 % 100 mL IVPB (MB+) Q12H    oxyCODONE immediate release tablet 5 mg Q6H PRN    QUEtiapine tablet 25 mg Daily PRN    sertraline tablet 100 mg Daily    sodium chloride 0.9% flush 10 mL PRN    trazodone split tablet 25 mg QHS       Objective:     Vital Signs (Most Recent):  Temp: 98.2 °F (36.8 °C) (05/10/24 1100)  Pulse: 70 (05/10/24 1100)  Resp: 14 (05/10/24 1100)  BP: (!) 128/58 (05/10/24 1100)  SpO2: 100 % (05/10/24 1100) Vital Signs (24h Range):  Temp:  [97.2 °F (36.2 °C)-98.2 °F (36.8 °C)] 98.2 °F (36.8 °C)  Pulse:  [70-88] 70  Resp:  [9-27] 14  SpO2:  [92 %-100 %] 100 %  BP: (116-159)/(55-97) 128/58     Weight: 67.5 kg (148 lb 13 oz) (05/10/24 0510)  Body mass index is 45.41 kg/m².  Body surface area is 1.51 meters squared.    I/O last 3 completed shifts:  In: 1321.7 [P.O.:462; I.V.:146.1;  Other:500; IV Piggyback:213.6]  Out: 2704 [Urine:204; Other:2500]     Physical Exam     Significant Labs:  CBC:   Recent Labs   Lab 05/10/24  0509   WBC 6.44   RBC 3.63*   HGB 11.2*   HCT 35.4*      MCV 98   MCH 30.9   MCHC 31.6*     CMP:   Recent Labs   Lab 05/10/24  0509   *   CALCIUM 9.7   ALBUMIN 2.5*   PROT 8.5*      K 3.9   CO2 23      BUN 27*   CREATININE 3.9*   ALKPHOS 170*   ALT 9*   AST 26   BILITOT 0.7     All labs within the past 24 hours have been reviewed.         Assessment/Plan:     Renal/  ESRD (end stage renal disease)  ESRD on iHD MWF  Dr. Jesika MATA TDC  + residual renal fx    Missed HD session on Monday prior to presentation.    Plan/Recommendations:    -Continue MWD iHD schedule, next HD  5/13  -strict I/O's  -Renal diet when advanced  -phos WNL, will monitor for now.  No need for phos binders at this time  -Hgb @ goal for ESRD, continue trending.  No need for NAEEM therapy.    ID  * Severe sepsis  -management primary        Thank you for your consult. I will follow-up with patient. Please contact us if you have any additional questions.    Katie Monique DNP  Nephrology  [facility]

## 2024-05-10 NOTE — PLAN OF CARE
[facility] - Cardiac Medical ICU  Initial Discharge Assessment       Primary Care Provider: Magen Christensen MD    Admission Diagnosis: Unresponsive [R41.89]  AMS (altered mental status) [R41.82]  Hypoxia [R09.02]    Admission Date: 5/7/2024  Expected Discharge Date: 5/13/2024    Transition of Care Barriers: None    Payor: HU[employer] MANAGED MEDICARE / Plan: HU[employer] SNP HMO PPO SPECIAL NEEDS / Product Type: Medicare Advantage /     Extended Emergency Contact Information  Primary Emergency Contact: Randell Connelly  Address: [address] [place] [address]  Mobile Phone: [phone]  Relation: Spouse  Secondary Emergency Contact: Cornelia Connelly  Address: [address] [place] [address]  Home Phone: [phone]  Relation: Daughter    Discharge Plan A: Skilled Nursing Facility  Discharge Plan B: Home with family      [id number] - [place], [place] - [address] AT [facility] OF [facility]  [address]  [facility] [address]  Phone: [phone] Fax: [phone]    [facility] - [place], [place] - [address]  [address]  [facility] [address]  Phone: [phone] Fax: [phone]    [id number] - [place], [place] - [address] AT [facility] [address]  [address]  [place] [address]  Phone: [phone] Fax: [phone]    [facility] - [place], [place] - [address] [facility]  [address] [facility] [place] [address]  Phone: [phone] Fax: [phone]    [id number] - [place], [place] - [address] [facility] OF [address]  [address]  [place] [address]  Phone: [phone] Fax: [phone]      Initial Assessment (most recent)       Adult Discharge Assessment - 05/10/24 1013          Discharge Assessment    Assessment Type Discharge Planning Assessment     Confirmed/corrected address, phone number  and insurance Yes     Confirmed Demographics Correct on Facesheet     Source of Information family     If unable to respond/provide information was family/caregiver contacted? Yes     Contact Name/Number Randell Connelly, spouse/cp# [phone]     Does patient/caregiver understand observation status Yes     Communicated DEVAN with patient/caregiver Date not available/Unable to determine     Reason For Admission Severe Sepsis     People in Home spouse     Do you expect to return to your current living situation? Yes     Do you have help at home or someone to help you manage your care at home? Yes     Who are your caregiver(s) and their phone number(s)? Randell Connelly, spouse/cp# [phone]     Prior to hospitilization cognitive status: Unable to Assess     Current cognitive status: Unable to Assess     Walking or Climbing Stairs Difficulty yes     Walking or Climbing Stairs ambulation difficulty, requires equipment     Mobility Management wheelchairjany     Dressing/Bathing Difficulty yes     Dressing/Bathing bathing difficulty, assistance 1 person     Dressing/Bathing Management Shower chair, BSC     Home Layout Able to live on 1st floor     Equipment Currently Used at Home wheelchair;lift device;bedside commode;shower chair     Readmission within 30 days? No     Patient currently being followed by outpatient case management? No     Do you currently have service(s) that help you manage your care at home? No     Do you have prescription coverage? Yes     Coverage [employer] Managed [employer] - [employer] [facility] HMO PPO Special needs Capitated     Do you have any problems affording any of your prescribed medications? No     Is the patient taking medications as prescribed? yes     Who is going to help you get home at discharge? spouse     How do you get to doctors appointments? family or friend will provide;health plan transportation     Are you on dialysis? Yes     Dialysis Name and Scheduled days [facility] on  [address] MWF @ 10:40 am     Do you take coumadin? No   Eliquis    Discharge Plan A Skilled Nursing Facility     Discharge Plan B Home with family     DME Needed Upon Discharge  wheelchair     Discharge Plan discussed with: Spouse/sig other     Name(s) and Number(s) Randell Connelly, spouse/cp# [phone]     Transition of Care Barriers None        Physical Activity    On average, how many days per week do you engage in moderate to strenuous exercise (like a brisk walk)? 0 days     On average, how many minutes do you engage in exercise at this level? 0 min        Financial Resource Strain    How hard is it for you to pay for the very basics like food, housing, medical care, and heating? Not hard at all        Housing Stability    In the last 12 months, was there a time when you were not able to pay the mortgage or rent on time? No     At any time in the past 12 months, were you homeless or living in a shelter (including now)? No        Transportation Needs    Has the lack of transportation kept you from medical appointments, meetings, work or from getting things needed for daily living? No        Food Insecurity    Within the past 12 months, you worried that your food would run out before you got the money to buy more. Never true     Within the past 12 months, the food you bought just didn't last and you didn't have money to get more. Never true        Stress    Do you feel stress - tense, restless, nervous, or anxious, or unable to sleep at night because your mind is troubled all the time - these days? Not at all        Social Isolation    How often do you feel lonely or isolated from those around you?  Never        Alcohol Use    Q1: How often do you have a drink containing alcohol? Never     Q2: How many drinks containing alcohol do you have on a typical day when you are drinking? Patient does not drink     Q3: How often do you have six or more drinks on one occasion? Never        Utilities    In the past 12 months  has the electric, gas, oil, or water company threatened to shut off services in your home? No        Health Literacy    How often do you need to have someone help you when you read instructions, pamphlets, or other written material from your doctor or pharmacy? Always        OTHER    Name(s) of People in Home Randell, spouse                      EDUARD spoke with spouse via phone to complete dc planning assessment.  Patient is an AKA double amputee that requires assistance with adls.  Patient arrived to [facility] in private vehicle with spouse.  Spouse has requested snf placement for adls as patient was independent with adls several months prior to this hospital admission.  Spouse preference is [facility] because patient has been there before.  SW explained that additional preferences are needed in the event [facility] snf has no availability.  EDUARD will email snf list to: [email or web address]     Discharge Plan A and Plan B have been determined by review of patient's clinical status, future medical and therapeutic needs, and coverage/benefits for post-acute care in coordination with multidisciplinary team members.      Sisi Sagastume, IKER  [facility]  X [id number]

## 2024-05-11 LAB
ANION GAP SERPL CALC-SCNC: 13 MMOL/L (ref 8–16)
BASOPHILS # BLD AUTO: 0.06 K/UL (ref 0–0.2)
BASOPHILS NFR BLD: 1.4 % (ref 0–1.9)
BUN SERPL-MCNC: 37 MG/DL (ref 6–20)
CALCIUM SERPL-MCNC: 9.3 MG/DL (ref 8.7–10.5)
CHLORIDE SERPL-SCNC: 104 MMOL/L (ref 95–110)
CO2 SERPL-SCNC: 19 MMOL/L (ref 23–29)
CREAT SERPL-MCNC: 5.9 MG/DL (ref 0.5–1.4)
DIFFERENTIAL METHOD BLD: ABNORMAL
EOSINOPHIL # BLD AUTO: 0.2 K/UL (ref 0–0.5)
EOSINOPHIL NFR BLD: 3.5 % (ref 0–8)
ERYTHROCYTE [DISTWIDTH] IN BLOOD BY AUTOMATED COUNT: 17.2 % (ref 11.5–14.5)
EST. GFR  (NO RACE VARIABLE): 7.8 ML/MIN/1.73 M^2
GLUCOSE SERPL-MCNC: 127 MG/DL (ref 70–110)
HCT VFR BLD AUTO: 38.8 % (ref 37–48.5)
HGB BLD-MCNC: 12 G/DL (ref 12–16)
IMM GRANULOCYTES # BLD AUTO: 0.02 K/UL (ref 0–0.04)
IMM GRANULOCYTES NFR BLD AUTO: 0.5 % (ref 0–0.5)
LYMPHOCYTES # BLD AUTO: 0.7 K/UL (ref 1–4.8)
LYMPHOCYTES NFR BLD: 15.9 % (ref 18–48)
MCH RBC QN AUTO: 31.2 PG (ref 27–31)
MCHC RBC AUTO-ENTMCNC: 30.9 G/DL (ref 32–36)
MCV RBC AUTO: 101 FL (ref 82–98)
MONOCYTES # BLD AUTO: 0.5 K/UL (ref 0.3–1)
MONOCYTES NFR BLD: 12.2 % (ref 4–15)
NEUTROPHILS # BLD AUTO: 2.9 K/UL (ref 1.8–7.7)
NEUTROPHILS NFR BLD: 66.5 % (ref 38–73)
NRBC BLD-RTO: 0 /100 WBC
PLATELET # BLD AUTO: 168 K/UL (ref 150–450)
PMV BLD AUTO: 12.4 FL (ref 9.2–12.9)
POCT GLUCOSE: 121 MG/DL (ref 70–110)
POCT GLUCOSE: 158 MG/DL (ref 70–110)
POCT GLUCOSE: 167 MG/DL (ref 70–110)
POCT GLUCOSE: 179 MG/DL (ref 70–110)
POCT GLUCOSE: 191 MG/DL (ref 70–110)
POTASSIUM SERPL-SCNC: 5.1 MMOL/L (ref 3.5–5.1)
RBC # BLD AUTO: 3.85 M/UL (ref 4–5.4)
SODIUM SERPL-SCNC: 136 MMOL/L (ref 136–145)
WBC # BLD AUTO: 4.34 K/UL (ref 3.9–12.7)

## 2024-05-11 PROCEDURE — 21400001 HC TELEMETRY ROOM: Mod: HCNC

## 2024-05-11 PROCEDURE — 63600175 PHARM REV CODE 636 W HCPCS: Mod: HCNC | Performed by: STUDENT IN AN ORGANIZED HEALTH CARE EDUCATION/TRAINING PROGRAM

## 2024-05-11 PROCEDURE — 25000003 PHARM REV CODE 250: Mod: HCNC | Performed by: STUDENT IN AN ORGANIZED HEALTH CARE EDUCATION/TRAINING PROGRAM

## 2024-05-11 PROCEDURE — 85025 COMPLETE CBC W/AUTO DIFF WBC: CPT | Mod: HCNC | Performed by: STUDENT IN AN ORGANIZED HEALTH CARE EDUCATION/TRAINING PROGRAM

## 2024-05-11 PROCEDURE — 80048 BASIC METABOLIC PNL TOTAL CA: CPT | Mod: HCNC | Performed by: STUDENT IN AN ORGANIZED HEALTH CARE EDUCATION/TRAINING PROGRAM

## 2024-05-11 PROCEDURE — 36415 COLL VENOUS BLD VENIPUNCTURE: CPT | Mod: HCNC | Performed by: STUDENT IN AN ORGANIZED HEALTH CARE EDUCATION/TRAINING PROGRAM

## 2024-05-11 PROCEDURE — 63600175 PHARM REV CODE 636 W HCPCS: Mod: HCNC | Performed by: NURSE PRACTITIONER

## 2024-05-11 RX ADMIN — ATORVASTATIN CALCIUM 80 MG: 40 TABLET, FILM COATED ORAL at 10:05

## 2024-05-11 RX ADMIN — CEFTRIAXONE 1 G: 1 INJECTION, POWDER, FOR SOLUTION INTRAMUSCULAR; INTRAVENOUS at 10:05

## 2024-05-11 RX ADMIN — CARVEDILOL 6.25 MG: 6.25 TABLET, FILM COATED ORAL at 10:05

## 2024-05-11 RX ADMIN — CLOPIDOGREL BISULFATE 75 MG: 75 TABLET ORAL at 10:05

## 2024-05-11 RX ADMIN — APIXABAN 5 MG: 5 TABLET, FILM COATED ORAL at 10:05

## 2024-05-11 RX ADMIN — INSULIN ASPART 1 UNITS: 100 INJECTION, SOLUTION INTRAVENOUS; SUBCUTANEOUS at 10:05

## 2024-05-11 RX ADMIN — SERTRALINE HYDROCHLORIDE 100 MG: 50 TABLET ORAL at 10:05

## 2024-05-11 RX ADMIN — TRAZODONE HYDROCHLORIDE 25 MG: 50 TABLET ORAL at 10:05

## 2024-05-11 RX ADMIN — FAMOTIDINE 20 MG: 20 TABLET ORAL at 10:05

## 2024-05-11 NOTE — PLAN OF CARE
Problem: Fall Injury Risk  Goal: Absence of Fall and Fall-Related Injury  Outcome: Progressing     Problem: Restraint, Nonviolent  Goal: Absence of Harm or Injury  Outcome: Progressing     Problem: Adult Inpatient Plan of Care  Goal: Plan of Care Review  Outcome: Progressing  Goal: Patient-Specific Goal (Individualized)  Outcome: Progressing  Goal: Absence of Hospital-Acquired Illness or Injury  Outcome: Progressing  Goal: Optimal Comfort and Wellbeing  Outcome: Progressing  Goal: Readiness for Transition of Care  Outcome: Progressing     Problem: Bariatric Environmental Safety  Goal: Safety Maintained with Care  Outcome: Progressing     Problem: Diabetes Comorbidity  Goal: Blood Glucose Level Within Targeted Range  Outcome: Progressing      Further work up. Further work up.

## 2024-05-11 NOTE — PROGRESS NOTES
[facility]  Progress Note    Patient Name: Suyapa Connelly  MRN: [id number]  Patient Class: IP- Inpatient   Admission Date: 5/7/2024  Length of Stay: 4 days  Attending Physician: Kaitlyn Otero DO  Primary Care Provider: Magen Christensen MD        Subjective:     Principal Problem:Severe sepsis        HPI:  Ms. Suyapa Connelly is a 57 y/o F with hx of severe PVD ultimately requiring bilateral LE AKAs, ESRD on HD, IDDM2, CAD s/p CABG, Afib on eliquis, debility who initially presented to the ED via EMS after an episode of unresponsiveness at home. Patient was apparently at her mental baseline this morning, when she suddenly became unresponsive per . EMS was called and were unable to arouse patient. She was brought into the ED and intubated shortly after arrival. Of note, patient has had 6 different hospitalizations over the past year related to encephalopathy. Patient was most recently admitted 3/22-3/23/24 for evaluation of steady cognitive decline over the past year. It appears patient had declined over the past year to the extent that she is unable to perform independent ADLs.  not ready for NH placement as of yet.     In the ED, patient afebrile, SBP 120s, HR 80s, intubated at 40% FiO2 Peep 5. CBC grossly unchanged from baseline, no leukocytosis or worsening anemia. CMP without significant electrolyte abnormalities, BUN 59, BGL 130s. Lactic wnl. UA c/f infection with >100 wbc and many bacteria. UDS pan-negative. pH wnl and no hypercarbia on VBG. Patient admitted to MICU for continued management.       Overview/Hospital Course:  Ms. Suyapa Connelly is a 57 y/o F with hx of severe PVD ultimately requiring bilateral LE AKAs, ESRD on HD, IDDM2, CAD s/p CABG, Afib on eliquis and debility who initially presented to the ED via EMS on 5/7 after an episode of unresponsiveness at home. Patient was apparently at her mental baseline on the morning of 5/7, when she suddenly became  unresponsive per . EMS was called and were unable to arouse patient. She was brought into the ED and intubated shortly after arrival. It appears patient had declined over the past year to the extent that she is unable to perform independent ADLs. In the ED, patient afebrile, SBP 120s, HR 80s, intubated at 40% FiO2 Peep 5. CBC grossly unchanged from baseline, no leukocytosis or worsening anemia. CMP without significant electrolyte abnormalities, BUN 59, BGL 130s. Lactic wnl. UA c/f infection with >100 wbc and many bacteria. Empirically started on vanc and meropenem. UDS pan-negative. pH wnl and no hypercarbia on VBG. Patient admitted to MICU for continued management on 5/7. Patient was subsequently extubated the next day. Nephrology consulted on admission for HD (missed Monday 5/6). ID consulted and discontinued vanc. Meropenem continued until 5/10 when urine culture resulted with pan-sensitive E coli. Stepped down to medicine on 5/10 and antibiotics de-escalated to Rocephin monotherapy through 5/14. (Oral cefpodoxime 200 mg QD- renally dosed is an option at [facility] if needed). Blood cultures from admission with NGTD. Reevaluated known HFrEF with repeat Echo with similar EF of 15%. SLP evaluated her for dysphagia which is progressively improving, on diabetic diet at time of step down. PT/OT following for early mobility and rehabilitation evaluation.    Pending SNF placement.     Interval History:     Stepped down to medicine yesterday afternoon. Altered. She is oriented to self only. No complaints. Ate breakfast.     Review of Systems   Unable to perform ROS: Acuity of condition     Objective:     Vital Signs (Most Recent):  Temp: 97.1 °F (36.2 °C) (05/10/24 1208)  Pulse: 76 (05/10/24 1208)  Resp: 16 (05/10/24 1208)  BP: (!) 106/58 (05/10/24 1208)  SpO2: (!) 94 % (05/10/24 1208) Vital Signs (24h Range):  Temp:  [97.1 °F (36.2 °C)-98.2 °F (36.8 °C)] 97.1 °F (36.2 °C)  Pulse:  [70-88] 76  Resp:  [9-25] 16  SpO2:  [92  %-100 %] 94 %  BP: (106-159)/(58-97) 106/58     Weight: 67.5 kg (148 lb 13 oz)  Body mass index is 45.41 kg/m².    Estimated Creatinine Clearance: 11.7 mL/min (A) (based on SCr of 3.9 mg/dL (H)).     Physical Exam  Vitals reviewed.   Constitutional:       Appearance: She is not ill-appearing.   HENT:      Head: Normocephalic and atraumatic.      Mouth/Throat:      Mouth: Mucous membranes are moist.   Eyes:      Extraocular Movements: Extraocular movements intact.      Conjunctiva/sclera: Conjunctivae normal.   Cardiovascular:      Rate and Rhythm: Normal rate.      Pulses: Normal pulses.      Comments: Has left sided tunnel catheter placed   Pulmonary:      Effort: Pulmonary effort is normal. No respiratory distress.      Breath sounds: Normal breath sounds.   Abdominal:      General: Abdomen is flat.      Palpations: Abdomen is soft.   Genitourinary:     Comments: Kerns placed  Musculoskeletal:      Comments: B/l AKA amputation, no increased erythema noted    Skin:     General: Skin is warm.      Comments: Some areas of mild superficial skin breakdown on buttocks    Neurological:      Mental Status: She is alert.          Significant Labs: All pertinent labs within the past 24 hours have been reviewed.    Significant Imaging: I have reviewed all pertinent imaging results/findings within the past 24 hours.    Assessment/Plan:      * Severe sepsis  - ID consulted   - 2/2 pan-sensitive E coli UTI; continue Ropcehin through 5/13 and end of 5/14     ESRD (end stage renal disease)  HD per nephro; appreciate assistance     Chronic combined systolic and diastolic heart failure  - Continue Coreg  - HD for volume status       Encephalopathy  - Vascular neurology consulted. Appreciate assistance   - CTH, MRI brain negative, Tox negative  - Delirium precautions   - Replace electrolytes to keep Mg > 2, Phos > 3, and K > 4  - Nephrology HD MWF, dialyzed 5/8/24    Appears to be functionally declining slowly. Unclear true  baseline. Vascular neurology signed off.   Possibly acute exacerbation with UTI/sepsis. Continue antibiotics through 5/13 and end of 5/14     Impaired functional mobility and endurance  - PT/OT consulted  - Possible SNF placement      Paroxysmal atrial fibrillation  - Continue coreg  - Continue Eliquis     Anemia due to chronic kidney disease, on chronic dialysis  - At Baseline  - Epo per nephro     CAD (coronary artery disease)  - Continue home plavix     Type 2 diabetes mellitus with hyperglycemia, with long-term current use of insulin  - Inpatient glucose goal 140-180  - Most Recent HbA1c: 8.0 in 4/2024   - MDSSI   - Glucose checks POCT qACHS ordered       CAROLIN (generalized anxiety disorder)  - Continue home sertraline and trazodone     Peripheral vascular disease  - Continue plavix         VTE Risk Mitigation (From admission, onward)           Ordered     apixaban tablet 5 mg  2 times daily         05/09/24 1630     IP VTE HIGH RISK PATIENT  Once         05/07/24 2238     Place sequential compression device  Until discontinued         05/07/24 2238                    Discharge Planning   DEVAN: 5/15/2024     Code Status: Full Code   Is the patient medically ready for discharge?:     Reason for patient still in hospital (select all that apply): Treatment and Pending disposition  Discharge Plan A: Skilled Nursing Facility        Kaitlyn Otero DO  Department of Hospital Medicine   [facility] Surg

## 2024-05-11 NOTE — PLAN OF CARE
Problem: Fall Injury Risk  Goal: Absence of Fall and Fall-Related Injury  Outcome: Not Progressing     Problem: Restraint, Nonviolent  Goal: Absence of Harm or Injury  Outcome: Not Progressing     Problem: Adult Inpatient Plan of Care  Goal: Plan of Care Review  Outcome: Not Progressing  Goal: Patient-Specific Goal (Individualized)  Outcome: Not Progressing  Goal: Absence of Hospital-Acquired Illness or Injury  Outcome: Not Progressing  Goal: Optimal Comfort and Wellbeing  Outcome: Not Progressing  Goal: Readiness for Transition of Care  Outcome: Not Progressing     Problem: Bariatric Environmental Safety  Goal: Safety Maintained with Care  Outcome: Not Progressing     Problem: Diabetes Comorbidity  Goal: Blood Glucose Level Within Targeted Range  Outcome: Not Progressing     Problem: Sepsis/Septic Shock  Goal: Optimal Coping  Outcome: Not Progressing  Goal: Absence of Bleeding  Outcome: Not Progressing  Goal: Blood Glucose Level Within Targeted Range  Outcome: Not Progressing  Goal: Absence of Infection Signs and Symptoms  Outcome: Not Progressing  Goal: Optimal Nutrition Intake  Outcome: Not Progressing     Problem: Infection  Goal: Absence of Infection Signs and Symptoms  Outcome: Not Progressing     Problem: Wound  Goal: Optimal Coping  Outcome: Not Progressing  Goal: Optimal Functional Ability  Outcome: Not Progressing  Goal: Absence of Infection Signs and Symptoms  Outcome: Not Progressing  Goal: Improved Oral Intake  Outcome: Not Progressing  Goal: Optimal Pain Control and Function  Outcome: Not Progressing  Goal: Skin Health and Integrity  Outcome: Not Progressing  Goal: Optimal Wound Healing  Outcome: Not Progressing     Problem: Skin Injury Risk Increased  Goal: Skin Health and Integrity  Outcome: Not Progressing     Problem: Hemodialysis  Goal: Safe, Effective Therapy Delivery  Outcome: Not Progressing  Goal: Effective Tissue Perfusion  Outcome: Not Progressing  Goal: Absence of Infection Signs and  Symptoms  Outcome: Not Progressing

## 2024-05-12 LAB
ANION GAP SERPL CALC-SCNC: 12 MMOL/L (ref 8–16)
BACTERIA BLD CULT: NORMAL
BACTERIA BLD CULT: NORMAL
BASOPHILS # BLD AUTO: 0.07 K/UL (ref 0–0.2)
BASOPHILS NFR BLD: 1.8 % (ref 0–1.9)
BUN SERPL-MCNC: 46 MG/DL (ref 6–20)
CALCIUM SERPL-MCNC: 8.7 MG/DL (ref 8.7–10.5)
CHLORIDE SERPL-SCNC: 103 MMOL/L (ref 95–110)
CO2 SERPL-SCNC: 19 MMOL/L (ref 23–29)
CREAT SERPL-MCNC: 6.5 MG/DL (ref 0.5–1.4)
DIFFERENTIAL METHOD BLD: ABNORMAL
EOSINOPHIL # BLD AUTO: 0.2 K/UL (ref 0–0.5)
EOSINOPHIL NFR BLD: 5.3 % (ref 0–8)
ERYTHROCYTE [DISTWIDTH] IN BLOOD BY AUTOMATED COUNT: 17.1 % (ref 11.5–14.5)
EST. GFR  (NO RACE VARIABLE): 7 ML/MIN/1.73 M^2
GLUCOSE SERPL-MCNC: 110 MG/DL (ref 70–110)
HCT VFR BLD AUTO: 35.6 % (ref 37–48.5)
HGB BLD-MCNC: 11.4 G/DL (ref 12–16)
IMM GRANULOCYTES # BLD AUTO: 0.01 K/UL (ref 0–0.04)
IMM GRANULOCYTES NFR BLD AUTO: 0.3 % (ref 0–0.5)
LYMPHOCYTES # BLD AUTO: 0.9 K/UL (ref 1–4.8)
LYMPHOCYTES NFR BLD: 21.3 % (ref 18–48)
MCH RBC QN AUTO: 31.5 PG (ref 27–31)
MCHC RBC AUTO-ENTMCNC: 32 G/DL (ref 32–36)
MCV RBC AUTO: 98 FL (ref 82–98)
MONOCYTES # BLD AUTO: 0.6 K/UL (ref 0.3–1)
MONOCYTES NFR BLD: 14 % (ref 4–15)
NEUTROPHILS # BLD AUTO: 2.3 K/UL (ref 1.8–7.7)
NEUTROPHILS NFR BLD: 57.3 % (ref 38–73)
NRBC BLD-RTO: 0 /100 WBC
PLATELET # BLD AUTO: 161 K/UL (ref 150–450)
PMV BLD AUTO: 11.8 FL (ref 9.2–12.9)
POCT GLUCOSE: 117 MG/DL (ref 70–110)
POCT GLUCOSE: 151 MG/DL (ref 70–110)
POCT GLUCOSE: 242 MG/DL (ref 70–110)
POTASSIUM SERPL-SCNC: 5 MMOL/L (ref 3.5–5.1)
RBC # BLD AUTO: 3.62 M/UL (ref 4–5.4)
SODIUM SERPL-SCNC: 134 MMOL/L (ref 136–145)
WBC # BLD AUTO: 4 K/UL (ref 3.9–12.7)

## 2024-05-12 PROCEDURE — 21400001 HC TELEMETRY ROOM: Mod: HCNC

## 2024-05-12 PROCEDURE — 25000003 PHARM REV CODE 250: Mod: HCNC | Performed by: NURSE PRACTITIONER

## 2024-05-12 PROCEDURE — 80048 BASIC METABOLIC PNL TOTAL CA: CPT | Mod: HCNC | Performed by: STUDENT IN AN ORGANIZED HEALTH CARE EDUCATION/TRAINING PROGRAM

## 2024-05-12 PROCEDURE — 36415 COLL VENOUS BLD VENIPUNCTURE: CPT | Mod: HCNC | Performed by: STUDENT IN AN ORGANIZED HEALTH CARE EDUCATION/TRAINING PROGRAM

## 2024-05-12 PROCEDURE — 25000003 PHARM REV CODE 250: Mod: HCNC | Performed by: STUDENT IN AN ORGANIZED HEALTH CARE EDUCATION/TRAINING PROGRAM

## 2024-05-12 PROCEDURE — 63600175 PHARM REV CODE 636 W HCPCS: Mod: HCNC | Performed by: STUDENT IN AN ORGANIZED HEALTH CARE EDUCATION/TRAINING PROGRAM

## 2024-05-12 PROCEDURE — 85025 COMPLETE CBC W/AUTO DIFF WBC: CPT | Mod: HCNC | Performed by: STUDENT IN AN ORGANIZED HEALTH CARE EDUCATION/TRAINING PROGRAM

## 2024-05-12 RX ADMIN — CARVEDILOL 6.25 MG: 6.25 TABLET, FILM COATED ORAL at 09:05

## 2024-05-12 RX ADMIN — SERTRALINE HYDROCHLORIDE 100 MG: 50 TABLET ORAL at 09:05

## 2024-05-12 RX ADMIN — APIXABAN 5 MG: 5 TABLET, FILM COATED ORAL at 09:05

## 2024-05-12 RX ADMIN — CLOPIDOGREL BISULFATE 75 MG: 75 TABLET ORAL at 09:05

## 2024-05-12 RX ADMIN — QUETIAPINE FUMARATE 25 MG: 25 TABLET ORAL at 12:05

## 2024-05-12 RX ADMIN — CEFTRIAXONE 1 G: 1 INJECTION, POWDER, FOR SOLUTION INTRAMUSCULAR; INTRAVENOUS at 09:05

## 2024-05-12 RX ADMIN — TRAZODONE HYDROCHLORIDE 25 MG: 50 TABLET ORAL at 09:05

## 2024-05-12 RX ADMIN — FAMOTIDINE 20 MG: 20 TABLET ORAL at 09:05

## 2024-05-12 RX ADMIN — ALLOPURINOL 50 MG: 300 TABLET ORAL at 09:05

## 2024-05-12 RX ADMIN — OXYCODONE 5 MG: 5 TABLET ORAL at 12:05

## 2024-05-12 RX ADMIN — ATORVASTATIN CALCIUM 80 MG: 40 TABLET, FILM COATED ORAL at 09:05

## 2024-05-12 NOTE — CONSULTS
"Encounter Date: 10/12/2022    SCRIBE #1 NOTE: I, Blessing Rosario, am scribing for, and in the presence of,  Raza Velazquez MD. I have scribed the following portions of the note - the EKG reading. Other sections scribed: HPI, ROS, PE.     History     Chief Complaint   Patient presents with    Dizziness    Chest Pain     Pt presents c/o dizziness/ CP/epigastric. Onset today.  Denies sob./ denies cardiac hx.       44 y/o female with history of GERD, fibromyalgia, and a cholecystectomy presents to the ED with complains of chest pain onset yesterday. Pt reports that she was laying down and began to feel some discomfort in her central chest area, and she got up to walk around and the pain intensified. She says her  checked her blood pressure and it was "through the roof" and that her heart rate was 147. She notes that she also experienced SOB and bilateral arm pain with the pain, and that it lasted about 15-20 minutes. Pt had been feeling dizzy on and off all day. She had a stress test and EKG done at her cardiologist 5-6 years ago.  She also complains of bilateral ankle and wrist swelling. She notes that she has experienced this pain two other times in the past but it was never this intense and did not last as long; she also notes that it is a different pain from her GERD symptoms.    The history is provided by the patient. No  was used.   Chest Pain  The current episode started yesterday. Duration of episode(s) is 20 minutes. Chest pain occurs intermittently. The chest pain is improving. Quality: "pain" The pain does not radiate. Primary symptoms include shortness of breath. Pertinent negatives for primary symptoms include no fever and no abdominal pain.   The shortness of breath began yesterday. The shortness of breath developed at rest.   Pertinent negatives for associated symptoms include no weakness. She tried nothing for the symptoms. There are no known risk factors.   Review of patient's " NIAS placed 20g 1 3/4 PIV in RAC using u/s guidance.   allergies indicates:   Allergen Reactions    Shellfish containing products     Amoxicillin Rash     Past Medical History:   Diagnosis Date    Fibromyalgia     GERD (gastroesophageal reflux disease)     Hypovitaminosis D     MVP (mitral valve prolapse)     OCD (obsessive compulsive disorder)     SI (sacroiliac) joint dysfunction      Past Surgical History:   Procedure Laterality Date    galbladder endoscopy  2007     No family history on file.  Social History     Tobacco Use    Smoking status: Never    Smokeless tobacco: Never   Substance Use Topics    Alcohol use: Never    Drug use: Never     Review of Systems   Constitutional:  Negative for fever.   HENT:  Negative for sore throat.    Eyes:  Negative for visual disturbance.   Respiratory:  Positive for shortness of breath.    Cardiovascular:  Positive for chest pain.   Gastrointestinal:  Negative for abdominal pain.   Genitourinary:  Negative for dysuria.   Musculoskeletal:  Negative for joint swelling.   Skin:  Negative for rash.   Neurological:  Negative for weakness.   Psychiatric/Behavioral:  Negative for confusion.    All other systems reviewed and are negative.    Physical Exam     Initial Vitals   BP Pulse Resp Temp SpO2   10/12/22 1659 10/12/22 1659 10/12/22 1659 10/13/22 0616 10/12/22 1659   122/73 96 16 98.2 °F (36.8 °C) 100 %      MAP       --                Physical Exam    Nursing note and vitals reviewed.  Constitutional: She appears well-developed and well-nourished.   HENT:   Head: Normocephalic and atraumatic.   Eyes: EOM are normal. Pupils are equal, round, and reactive to light.   Neck:   Normal range of motion.  Cardiovascular:  Normal rate, regular rhythm, normal heart sounds and intact distal pulses.           No murmur heard.  Pulmonary/Chest: Breath sounds normal. No respiratory distress. She has no wheezes. She has no rales.   Abdominal: Abdomen is soft. She exhibits no distension. There is no abdominal tenderness. There is no rebound.    Musculoskeletal:         General: No tenderness or edema. Normal range of motion.      Cervical back: Normal range of motion.     Neurological: She is alert. She has normal strength. No cranial nerve deficit. GCS score is 15. GCS eye subscore is 4. GCS verbal subscore is 5. GCS motor subscore is 6.   Skin: Skin is warm and dry. Capillary refill takes less than 2 seconds. No rash noted. No erythema.   Psychiatric: She has a normal mood and affect.       ED Course   Procedures  Labs Reviewed   COMPREHENSIVE METABOLIC PANEL - Abnormal; Notable for the following components:       Result Value    Glucose Level 105 (*)     Globulin 3.7 (*)     Alanine Aminotransferase 63 (*)     Aspartate Aminotransferase 98 (*)     All other components within normal limits   TROPONIN I - Abnormal; Notable for the following components:    Troponin-I 0.049 (*)     All other components within normal limits   B-TYPE NATRIURETIC PEPTIDE - Normal   PROTIME-INR - Normal   TROPONIN I - Normal   CBC W/ AUTO DIFFERENTIAL    Narrative:     The following orders were created for panel order CBC auto differential.  Procedure                               Abnormality         Status                     ---------                               -----------         ------                     CBC with Differential[[id number]]                            Final result                 Please view results for these tests on the individual orders.   CBC WITH DIFFERENTIAL   TROPONIN I     EKG Readings: (Independently Interpreted)   Initial Reading: No STEMI. Rhythm: Normal Sinus Rhythm. Heart Rate: 87. Ectopy: No Ectopy. Conduction: Normal. ST Segments: Normal ST Segments. T Waves: Normal. Clinical Impression: Normal Sinus Rhythm   EKG performed at 17:01 on 10/12/2022.     Imaging Results              X-Ray Chest PA And Lateral (Final result)  Result time 10/12/22 20:28:17      Final result by Nilay Bright MD (10/12/22 20:28:17)                   Impression:       No acute cardiopulmonary process.      Electronically signed by: Nilay Bright  Date:    10/12/2022  Time:    20:28               Narrative:    EXAMINATION:  XR CHEST PA AND LATERAL    CLINICAL HISTORY:  Chest Pain;    TECHNIQUE:  Two views of the chest    COMPARISON:  No prior imaging available for comparison.    FINDINGS:  No focal opacification, pleural effusion, or pneumothorax.    The cardiomediastinal silhouette is within normal limits.    No acute osseous abnormality.                                       Medications   sodium chloride 0.9% flush 10 mL (has no administration in time range)   melatonin tablet 6 mg (has no administration in time range)   acetaminophen tablet 650 mg (has no administration in time range)   ondansetron injection 4 mg (has no administration in time range)   aspirin chewable tablet 324 mg (324 mg Oral Given 10/13/22 0009)   enoxaparin injection 70 mg (70 mg Subcutaneous Given 10/13/22 0123)     Medical Decision Making:   Clinical Tests:   Lab Tests: Reviewed and Ordered  Radiological Study: Ordered and Reviewed  Medical Tests: Reviewed and Ordered          Attending Attestation:           Physician Attestation for Scribe:  Physician Attestation Statement for Scribe #1: I, Raza Velazquez MD, reviewed documentation, as scribed by Blessing Rosario in my presence, and it is both accurate and complete.           ED Course as of 10/13/22 0616   Thu Oct 13, 2022   0101 HANNAH Velazquez:   Patient is a 45-year-old female with past medical history of GERD, fibromyalgia presents to the emergency department for substernal chest pain that began earlier today.  She reports she has had episodes similar to this previously but never lasting this long in duration.  Last approximately 15-20 minutes in duration.  Radiated to arms bilaterally.  Associated with some shortness of breath.  Described as a tightness.  Reports are in the 140s.  She reports she has seen cardiologist, Dr. Deluca in the past and had a  negative stress test 6 years ago.  Exam unremarkable, symmetric and equal pulses.  No calf swelling or tenderness to palpation noted.  EKG as noted.  Troponin elevated 0.049.  Patient has received an aspirin.  Will discuss with Cardiology.  All results discussed.  Answered all questions time.  Verbalized understanding agreed to plan. [RP]   0105 Spoke with cardiology. Will put pt into observation [MM]      ED Course User Index  [MM] Opal Perrin  [RP] Raza Velazquez MD                 Clinical Impression:   Final diagnoses:  [R07.9] Chest pain  [R77.8] Elevated troponin        ED Disposition Condition    Observation Stable                Raza Velazquez MD  10/13/22 0616

## 2024-05-12 NOTE — PROGRESS NOTES
[facility] Medicine  Progress Note    Patient Name: Suyapa Connelly  MRN: [id number]  Patient Class: IP- Inpatient   Admission Date: 5/7/2024  Length of Stay: 5 days  Attending Physician: Kaitlyn Otero DO  Primary Care Provider: Magen Christensen MD        Subjective:     Principal Problem:Severe sepsis        HPI:  Ms. Suyapa Connelly is a 57 y/o F with hx of severe PVD ultimately requiring bilateral LE AKAs, ESRD on HD, IDDM2, CAD s/p CABG, Afib on eliquis, debility who initially presented to the ED via EMS after an episode of unresponsiveness at home. Patient was apparently at her mental baseline this morning, when she suddenly became unresponsive per . EMS was called and were unable to arouse patient. She was brought into the ED and intubated shortly after arrival. Of note, patient has had 6 different hospitalizations over the past year related to encephalopathy. Patient was most recently admitted 3/22-3/23/24 for evaluation of steady cognitive decline over the past year. It appears patient had declined over the past year to the extent that she is unable to perform independent ADLs.  not ready for NH placement as of yet.     In the ED, patient afebrile, SBP 120s, HR 80s, intubated at 40% FiO2 Peep 5. CBC grossly unchanged from baseline, no leukocytosis or worsening anemia. CMP without significant electrolyte abnormalities, BUN 59, BGL 130s. Lactic wnl. UA c/f infection with >100 wbc and many bacteria. UDS pan-negative. pH wnl and no hypercarbia on VBG. Patient admitted to MICU for continued management.       Overview/Hospital Course:  Ms. Suyapa Connelly is a 57 y/o F with hx of severe PVD ultimately requiring bilateral LE AKAs, ESRD on HD, IDDM2, CAD s/p CABG, Afib on eliquis and debility who initially presented to the ED via EMS on 5/7 after an episode of unresponsiveness at home. Patient was apparently at her mental baseline on the morning of 5/7, when she suddenly became  unresponsive per . EMS was called and were unable to arouse patient. She was brought into the ED and intubated shortly after arrival. It appears patient had declined over the past year to the extent that she is unable to perform independent ADLs. In the ED, patient afebrile, SBP 120s, HR 80s, intubated at 40% FiO2 Peep 5. CBC grossly unchanged from baseline, no leukocytosis or worsening anemia. CMP without significant electrolyte abnormalities, BUN 59, BGL 130s. Lactic wnl. UA c/f infection with >100 wbc and many bacteria. Empirically started on vanc and meropenem. UDS pan-negative. pH wnl and no hypercarbia on VBG. Patient admitted to MICU for continued management on 5/7. Patient was subsequently extubated the next day. Nephrology consulted on admission for HD (missed Monday 5/6). ID consulted and discontinued vanc. Meropenem continued until 5/10 when urine culture resulted with pan-sensitive E coli. Stepped down to medicine on 5/10 and antibiotics de-escalated to Rocephin monotherapy through 5/14. (Oral cefpodoxime 200 mg QD- renally dosed is an option at OR if needed). Blood cultures from admission with NGTD. Reevaluated known HFrEF with repeat Echo with similar EF of 15%. SLP evaluated her for dysphagia which is progressively improving, on diabetic diet at time of step down. PT/OT following for early mobility and rehabilitation evaluation.    Pending SNF placement.     Interval History:     Mental status waxes and wanes. Was pulling lines out in the evening, but this am, she is alert and eating her breakfast independently. She is oriented to herself but thinks it is 2021 and when asked where she is, states 2021. This is an improvement from yesterday given all responses to all questions were 2021 yesterday.      Review of Systems   Unable to perform ROS: Acuity of condition     Objective:     Vital Signs (Most Recent):  Temp: 98 °F (36.7 °C) (05/12/24 0809)  Pulse: 70 (05/12/24 0809)  Resp: 17 (05/12/24  0809)  BP: 128/70 (05/12/24 0809)  SpO2: 95 % (05/12/24 0809) Vital Signs (24h Range):  Temp:  [97.4 °F (36.3 °C)-98.4 °F (36.9 °C)] 98 °F (36.7 °C)  Pulse:  [69-79] 70  Resp:  [16-18] 17  SpO2:  [93 %-98 %] 95 %  BP: (126-164)/(70-83) 128/70     Weight: 67.5 kg (148 lb 13.7 oz)  Body mass index is 45.42 kg/m².    Estimated Creatinine Clearance: 7 mL/min (A) (based on SCr of 6.5 mg/dL (H)).     Physical Exam  Vitals reviewed.   Constitutional:       Appearance: She is not ill-appearing.   HENT:      Head: Normocephalic and atraumatic.      Mouth/Throat:      Mouth: Mucous membranes are moist.   Eyes:      Extraocular Movements: Extraocular movements intact.      Conjunctiva/sclera: Conjunctivae normal.   Cardiovascular:      Rate and Rhythm: Normal rate.      Pulses: Normal pulses.      Comments: Has left sided tunnel catheter placed   Pulmonary:      Effort: Pulmonary effort is normal. No respiratory distress.      Breath sounds: Normal breath sounds.   Abdominal:      General: Abdomen is flat.      Palpations: Abdomen is soft.   Genitourinary:     Comments: Kerns placed  Musculoskeletal:      Comments: B/l AKA amputation, no increased erythema noted    Skin:     General: Skin is warm.      Comments: Some areas of mild superficial skin breakdown on buttocks    Neurological:      Mental Status: She is alert.          Significant Labs: All pertinent labs within the past 24 hours have been reviewed.    Significant Imaging: I have reviewed all pertinent imaging results/findings within the past 24 hours.    Assessment/Plan:      * Severe sepsis  - ID consulted   - 2/2 pan-sensitive E coli UTI; continue Ropcehin through 5/13 and end of 5/14     ESRD (end stage renal disease)  HD per nephro; appreciate assistance     Chronic combined systolic and diastolic heart failure  - Continue Coreg  - HD for volume status       Encephalopathy  - Vascular neurology consulted. Appreciate assistance   - CTH, MRI brain negative, Tox  negative  - Delirium precautions   - Replace electrolytes to keep Mg > 2, Phos > 3, and K > 4  - Nephrology HD MWF, dialyzed 5/8/24    Appears to be functionally declining slowly. Unclear true baseline. Vascular neurology signed off.   Possibly acute exacerbation with UTI/sepsis. Continue antibiotics through 5/13 and end of 5/14   Improving daily, but slowly     Impaired functional mobility and endurance  - PT/OT consulted  - Possible SNF placement if she is a candidate. If not, will need to discuss longterm care with family      Paroxysmal atrial fibrillation  - Continue coreg  - Continue Eliquis     Anemia due to chronic kidney disease, on chronic dialysis  - At Baseline  - Epo per nephro     CAD (coronary artery disease)  - Continue home plavix     Type 2 diabetes mellitus with hyperglycemia, with long-term current use of insulin  - Inpatient glucose goal 140-180  - Most Recent HbA1c: 8.0 in 4/2024   - MDSSI   - Glucose checks POCT qACHS ordered       CAROLIN (generalized anxiety disorder)  - Continue home sertraline and trazodone     Peripheral vascular disease  - Continue plavix         VTE Risk Mitigation (From admission, onward)           Ordered     apixaban tablet 5 mg  2 times daily         05/09/24 1630     IP VTE HIGH RISK PATIENT  Once         05/07/24 2238     Place sequential compression device  Until discontinued         05/07/24 2238                    Discharge Planning   DEVAN: 5/15/2024     Code Status: Full Code   Is the patient medically ready for discharge?:     Reason for patient still in hospital (select all that apply): Patient trending condition, Treatment, and Pending disposition  Discharge Plan A: Skilled Nursing Facility     Kaitlyn Otero DO  Department of Hospital Medicine   [facility] - Med Surg

## 2024-05-12 NOTE — SUBJECTIVE & OBJECTIVE
Interval History:     Mental status waxes and wanes. Was pulling lines out in the evening, but this am, she is alert and eating her breakfast independently. She is oriented to herself but thinks it is 2021 and when asked where she is, states 2021. This is an improvement from yesterday given all responses to all questions were 2021 yesterday.      Review of Systems   Unable to perform ROS: Acuity of condition     Objective:     Vital Signs (Most Recent):  Temp: 98 °F (36.7 °C) (05/12/24 0809)  Pulse: 70 (05/12/24 0809)  Resp: 17 (05/12/24 0809)  BP: 128/70 (05/12/24 0809)  SpO2: 95 % (05/12/24 0809) Vital Signs (24h Range):  Temp:  [97.4 °F (36.3 °C)-98.4 °F (36.9 °C)] 98 °F (36.7 °C)  Pulse:  [69-79] 70  Resp:  [16-18] 17  SpO2:  [93 %-98 %] 95 %  BP: (126-164)/(70-83) 128/70     Weight: 67.5 kg (148 lb 13.7 oz)  Body mass index is 45.42 kg/m².    Estimated Creatinine Clearance: 7 mL/min (A) (based on SCr of 6.5 mg/dL (H)).     Physical Exam  Vitals reviewed.   Constitutional:       Appearance: She is not ill-appearing.   HENT:      Head: Normocephalic and atraumatic.      Mouth/Throat:      Mouth: Mucous membranes are moist.   Eyes:      Extraocular Movements: Extraocular movements intact.      Conjunctiva/sclera: Conjunctivae normal.   Cardiovascular:      Rate and Rhythm: Normal rate.      Pulses: Normal pulses.      Comments: Has left sided tunnel catheter placed   Pulmonary:      Effort: Pulmonary effort is normal. No respiratory distress.      Breath sounds: Normal breath sounds.   Abdominal:      General: Abdomen is flat.      Palpations: Abdomen is soft.   Genitourinary:     Comments: Kerns placed  Musculoskeletal:      Comments: B/l AKA amputation, no increased erythema noted    Skin:     General: Skin is warm.      Comments: Some areas of mild superficial skin breakdown on buttocks    Neurological:      Mental Status: She is alert.          Significant Labs: All pertinent labs within the past 24 hours have  been reviewed.    Significant Imaging: I have reviewed all pertinent imaging results/findings within the past 24 hours.

## 2024-05-12 NOTE — ASSESSMENT & PLAN NOTE
- PT/OT consulted  - Possible SNF placement if she is a candidate. If not, will need to discuss group home care with family

## 2024-05-12 NOTE — ASSESSMENT & PLAN NOTE
- Vascular neurology consulted. Appreciate assistance   - CTH, MRI brain negative, Tox negative  - Delirium precautions   - Replace electrolytes to keep Mg > 2, Phos > 3, and K > 4  - Nephrology HD MWF, dialyzed 5/8/24    Appears to be functionally declining slowly. Unclear true baseline. Vascular neurology signed off.   Possibly acute exacerbation with UTI/sepsis. Continue antibiotics through 5/13 and end of 5/14   Improving daily, but slowly

## 2024-05-13 LAB
ANION GAP SERPL CALC-SCNC: 14 MMOL/L (ref 8–16)
BASOPHILS # BLD AUTO: 0.04 K/UL (ref 0–0.2)
BASOPHILS NFR BLD: 0.8 % (ref 0–1.9)
BUN SERPL-MCNC: 54 MG/DL (ref 6–20)
CALCIUM SERPL-MCNC: 8.9 MG/DL (ref 8.7–10.5)
CHLORIDE SERPL-SCNC: 102 MMOL/L (ref 95–110)
CO2 SERPL-SCNC: 16 MMOL/L (ref 23–29)
CREAT SERPL-MCNC: 7.1 MG/DL (ref 0.5–1.4)
DIFFERENTIAL METHOD BLD: ABNORMAL
EOSINOPHIL # BLD AUTO: 0.2 K/UL (ref 0–0.5)
EOSINOPHIL NFR BLD: 3.4 % (ref 0–8)
ERYTHROCYTE [DISTWIDTH] IN BLOOD BY AUTOMATED COUNT: 17.1 % (ref 11.5–14.5)
EST. GFR  (NO RACE VARIABLE): 6.3 ML/MIN/1.73 M^2
GLUCOSE SERPL-MCNC: 191 MG/DL (ref 70–110)
HCT VFR BLD AUTO: 36.9 % (ref 37–48.5)
HGB BLD-MCNC: 11.6 G/DL (ref 12–16)
IMM GRANULOCYTES # BLD AUTO: 0.01 K/UL (ref 0–0.04)
IMM GRANULOCYTES NFR BLD AUTO: 0.2 % (ref 0–0.5)
LYMPHOCYTES # BLD AUTO: 0.8 K/UL (ref 1–4.8)
LYMPHOCYTES NFR BLD: 17 % (ref 18–48)
MCH RBC QN AUTO: 30.9 PG (ref 27–31)
MCHC RBC AUTO-ENTMCNC: 31.4 G/DL (ref 32–36)
MCV RBC AUTO: 98 FL (ref 82–98)
MONOCYTES # BLD AUTO: 0.7 K/UL (ref 0.3–1)
MONOCYTES NFR BLD: 13.7 % (ref 4–15)
NEUTROPHILS # BLD AUTO: 3.1 K/UL (ref 1.8–7.7)
NEUTROPHILS NFR BLD: 64.9 % (ref 38–73)
NRBC BLD-RTO: 0 /100 WBC
PLATELET # BLD AUTO: 172 K/UL (ref 150–450)
PMV BLD AUTO: 12.3 FL (ref 9.2–12.9)
POCT GLUCOSE: 114 MG/DL (ref 70–110)
POCT GLUCOSE: 123 MG/DL (ref 70–110)
POCT GLUCOSE: 173 MG/DL (ref 70–110)
POCT GLUCOSE: 236 MG/DL (ref 70–110)
POTASSIUM SERPL-SCNC: 5.7 MMOL/L (ref 3.5–5.1)
RBC # BLD AUTO: 3.75 M/UL (ref 4–5.4)
SODIUM SERPL-SCNC: 132 MMOL/L (ref 136–145)
WBC # BLD AUTO: 4.76 K/UL (ref 3.9–12.7)

## 2024-05-13 PROCEDURE — 63600175 PHARM REV CODE 636 W HCPCS: Mod: HCNC | Performed by: STUDENT IN AN ORGANIZED HEALTH CARE EDUCATION/TRAINING PROGRAM

## 2024-05-13 PROCEDURE — 80048 BASIC METABOLIC PNL TOTAL CA: CPT | Mod: HCNC | Performed by: STUDENT IN AN ORGANIZED HEALTH CARE EDUCATION/TRAINING PROGRAM

## 2024-05-13 PROCEDURE — 36415 COLL VENOUS BLD VENIPUNCTURE: CPT | Mod: HCNC | Performed by: STUDENT IN AN ORGANIZED HEALTH CARE EDUCATION/TRAINING PROGRAM

## 2024-05-13 PROCEDURE — 25000003 PHARM REV CODE 250: Mod: HCNC | Performed by: STUDENT IN AN ORGANIZED HEALTH CARE EDUCATION/TRAINING PROGRAM

## 2024-05-13 PROCEDURE — 90935 HEMODIALYSIS ONE EVALUATION: CPT | Mod: HCNC,,, | Performed by: INTERNAL MEDICINE

## 2024-05-13 PROCEDURE — 21400001 HC TELEMETRY ROOM: Mod: HCNC

## 2024-05-13 PROCEDURE — 80100016 HC MAINTENANCE HEMODIALYSIS: Mod: HCNC

## 2024-05-13 PROCEDURE — 85025 COMPLETE CBC W/AUTO DIFF WBC: CPT | Mod: HCNC | Performed by: STUDENT IN AN ORGANIZED HEALTH CARE EDUCATION/TRAINING PROGRAM

## 2024-05-13 PROCEDURE — 25000003 PHARM REV CODE 250: Mod: HCNC | Performed by: NURSE PRACTITIONER

## 2024-05-13 RX ORDER — HYDRALAZINE HYDROCHLORIDE 25 MG/1
25 TABLET, FILM COATED ORAL EVERY 8 HOURS PRN
Status: DISCONTINUED | OUTPATIENT
Start: 2024-05-13 | End: 2024-05-21 | Stop reason: HOSPADM

## 2024-05-13 RX ORDER — HEPARIN SODIUM 1000 [USP'U]/ML
1000 INJECTION, SOLUTION INTRAVENOUS; SUBCUTANEOUS
OUTPATIENT
Start: 2024-05-13 | End: 2024-05-14

## 2024-05-13 RX ADMIN — TRAZODONE HYDROCHLORIDE 25 MG: 50 TABLET ORAL at 09:05

## 2024-05-13 RX ADMIN — FAMOTIDINE 20 MG: 20 TABLET ORAL at 01:05

## 2024-05-13 RX ADMIN — CLOPIDOGREL BISULFATE 75 MG: 75 TABLET ORAL at 01:05

## 2024-05-13 RX ADMIN — CARVEDILOL 6.25 MG: 6.25 TABLET, FILM COATED ORAL at 09:05

## 2024-05-13 RX ADMIN — SERTRALINE HYDROCHLORIDE 100 MG: 50 TABLET ORAL at 09:05

## 2024-05-13 RX ADMIN — ATORVASTATIN CALCIUM 80 MG: 40 TABLET, FILM COATED ORAL at 01:05

## 2024-05-13 RX ADMIN — OXYCODONE 5 MG: 5 TABLET ORAL at 10:05

## 2024-05-13 RX ADMIN — CEFTRIAXONE 1 G: 1 INJECTION, POWDER, FOR SOLUTION INTRAMUSCULAR; INTRAVENOUS at 10:05

## 2024-05-13 RX ADMIN — APIXABAN 5 MG: 5 TABLET, FILM COATED ORAL at 09:05

## 2024-05-13 NOTE — PROGRESS NOTES
NEPHROLOGY HEMODIALYSIS NOTE    Suyapa Connelly is a 58 y.o. female currently on hemodialysis for removal of uremic toxins and volume management.     Patient seen and evaluated on hemodialysis, tolerating treatment, see HD flowsheet for vitals and assessments.    No Hypotension, chest pain, shortness of breath, cramping, nausea or vomiting.      Labs have been reviewed and the dialysate bath has been adjusted.    Labs:      Recent Labs   Lab 05/08/24  0600 05/09/24  0408 05/10/24  0509 05/11/24  0508 05/12/24  0458 05/13/24  0509    140 136 136 134* 132*   K 4.5 4.8 3.9 5.1 5.0 5.7*    104 105 104 103 102   CO2 18* 24 23 19* 19* 16*   BUN 59* 62* 27* 37* 46* 54*   CREATININE 7.6* 7.9* 3.9* 5.9* 6.5* 7.1*   CALCIUM 8.8 9.0 9.7 9.3 8.7 8.9   PHOS 3.7 5.7* 2.8  --   --   --        Recent Labs   Lab 05/11/24  0508 05/12/24  0458 05/13/24  0509   WBC 4.34 4.00 4.76   HGB 12.0 11.4* 11.6*   HCT 38.8 35.6* 36.9*    161 172          Assessment/Plan:  - Seen on dialysis this morning, tolerating session with current UFR, no complications.    - Ultrafiltration goal: 2L  - Will continue dialysis treatments while in-patient  - Continue to monitor intake and output   - Renally dose medications  - Pre/Post dialysis treatment weights  - Renal diet  - Will continue to follow closely.

## 2024-05-13 NOTE — PT/OT/SLP PROGRESS
Speech Language Pathology      Suyapa Connelly  MRN: [id number]    SLP attempted to see Patient for therapy. Upon am attempts, Patient off the floor for dialysis.  Upon later attempt, Patient found asleep in bed with Spouse at the bedside.  Spouse on the phone and explained Patient tolerated her lunch meal tray without difficulty provided 1:1 assistance with bites/sips. He further explained Patient demonstrated good appetite this service day.  Patient in deep sleep and PO trials deferred.  SLP educated Spouse ongoing aspiration precautions and SLP POC. No additional questions noted.  SLP unable to make fourth attempt. Patient not seen today secondary to dialysis, fatigue. ST to continue to follow up per ST POC.    5/13/2024

## 2024-05-13 NOTE — NURSING
Dialysis tx started to the left chest perm cath per orders placed by ANDREA Rai:    Order Questions    Question Answer   Antibiotics on HD? No   Duration of Treatment 3 hours   Dialyzer F180NR   Dialysate Temperature (C) 37   Target  mL/min   If unable to maintain flow due to inadequate vascular access patency, patient intolerance (i.e. chest pain, access discomfort) or elevated venous pressure, adjust blood flow rate to a minimum of _____mL/min 100    mL/min   K+ Potassium per Protocol   Ca++ Calcium per Protocol   Na+ Sodium per Protocol   Bicarb Bicarbonate per Protocol   Access to be used Other (please specify)   Target UF 2L   If unable to maintain this UFR due to patient intolerance (i.e. hypotension, chest pain, muscle cramping, nausea or vomiting), adjust UFR to achieve a minimum of _______ liters of UF 0   Fluid Removal Instructions maintain SBP > 90 mmHG

## 2024-05-13 NOTE — PLAN OF CARE
05/13/24 1517   Post-Acute Status   Post-Acute Authorization Placement  (SNF)   Post-Acute Placement Status Referrals Sent   Coverage [facility]   Discharge Delays None known at this time   Discharge Plan   Discharge Plan A Skilled Nursing Facility   Discharge Plan B Skilled Nursing Facility     Met with patient and spouse to review discharge recommendation of SNF and they are agreeable to plan    Patient/family provided list of facilities in-network with patient's payor plan. Providers that are owned, operated, or affiliated with [facility] are included on the list.     Notified that referral sent to below listed facilities from in-network list based on proximity to home/family support:   [facility]  2.   [facility]  3.   [facility]  4.   [facility]  5. (can send more than 5)    Patient/family instructed to identify preference.    Preferred Facility: (if more than 1, listed in order of descending preference)  [facility]    If an additional preferred facility not listed above is identified, additional referral to be sent. If above facilities unable to accept, will send additional referrals to in-network providers.   Discharge Plan A and Plan B have been determined by review of patient's clinical status, future medical and therapeutic needs, and coverage/benefits for post-acute care in coordination with multidisciplinary team members.      [facility]  Case Management  [phone]

## 2024-05-13 NOTE — PLAN OF CARE
Problem: Hemodialysis  Goal: Safe, Effective Therapy Delivery  Outcome: Progressing  Goal: Effective Tissue Perfusion  Outcome: Progressing  Goal: Absence of Infection Signs and Symptoms  Outcome: Progressing       Dialysis tx ended to the left chest perm cath, saline locked, capped.    Net fluid removed: 2L    Pt htn during tx,asymptomatic,  Dr. Washington made aware, prn anti-htn med order placed.    Report given to nurse Roman, pt awaiting transport from [facility] to room 632 by stretcher.

## 2024-05-13 NOTE — PT/OT/SLP PROGRESS
Physical Therapy      Patient Name:  Suyapa Connelly   MRN:  [id number]    Patient not seen today secondary to Dialysis. Will follow-up per POC.

## 2024-05-13 NOTE — PLAN OF CARE
Problem: Fall Injury Risk  Goal: Absence of Fall and Fall-Related Injury  Outcome: Progressing     Problem: Restraint, Nonviolent  Goal: Absence of Harm or Injury  Outcome: Progressing     Problem: Adult Inpatient Plan of Care  Goal: Plan of Care Review  Outcome: Progressing  Goal: Patient-Specific Goal (Individualized)  Outcome: Progressing  Goal: Absence of Hospital-Acquired Illness or Injury  Outcome: Progressing  Goal: Optimal Comfort and Wellbeing  Outcome: Progressing  Goal: Readiness for Transition of Care  Outcome: Progressing     Problem: Bariatric Environmental Safety  Goal: Safety Maintained with Care  Outcome: Progressing     Problem: Diabetes Comorbidity  Goal: Blood Glucose Level Within Targeted Range  Outcome: Progressing     Problem: Sepsis/Septic Shock  Goal: Optimal Coping  Outcome: Progressing  Goal: Absence of Bleeding  Outcome: Progressing  Goal: Blood Glucose Level Within Targeted Range  Outcome: Progressing  Goal: Absence of Infection Signs and Symptoms  Outcome: Progressing  Goal: Optimal Nutrition Intake  Outcome: Progressing     Problem: Infection  Goal: Absence of Infection Signs and Symptoms  Outcome: Progressing     Problem: Wound  Goal: Optimal Coping  Outcome: Progressing  Goal: Optimal Functional Ability  Outcome: Progressing  Goal: Absence of Infection Signs and Symptoms  Outcome: Progressing  Goal: Improved Oral Intake  Outcome: Progressing  Goal: Optimal Pain Control and Function  Outcome: Progressing  Goal: Skin Health and Integrity  Outcome: Progressing  Goal: Optimal Wound Healing  Outcome: Progressing     Problem: Skin Injury Risk Increased  Goal: Skin Health and Integrity  Outcome: Progressing     Problem: Hemodialysis  Goal: Safe, Effective Therapy Delivery  Outcome: Progressing  Goal: Effective Tissue Perfusion  Outcome: Progressing  Goal: Absence of Infection Signs and Symptoms  Outcome: Progressing

## 2024-05-14 PROBLEM — L24.A0 IRRITANT CONTACT DERMATITIS DUE TO FRICTION OR CONTACT WITH BODY FLUIDS, UNSPECIFIED: Status: ACTIVE | Noted: 2024-05-14

## 2024-05-14 PROBLEM — D69.6 THROMBOCYTOPENIA: Status: ACTIVE | Noted: 2024-05-14

## 2024-05-14 PROBLEM — L89.150 PRESSURE INJURY OF SACRAL REGION, UNSTAGEABLE: Status: ACTIVE | Noted: 2024-05-14

## 2024-05-14 PROBLEM — E66.01 SEVERE OBESITY (BMI >= 40): Status: ACTIVE | Noted: 2024-05-14

## 2024-05-14 LAB
ANION GAP SERPL CALC-SCNC: 10 MMOL/L (ref 8–16)
BASOPHILS # BLD AUTO: 0.04 K/UL (ref 0–0.2)
BASOPHILS NFR BLD: 0.8 % (ref 0–1.9)
BUN SERPL-MCNC: 35 MG/DL (ref 6–20)
CALCIUM SERPL-MCNC: 8.2 MG/DL (ref 8.7–10.5)
CHLORIDE SERPL-SCNC: 99 MMOL/L (ref 95–110)
CO2 SERPL-SCNC: 23 MMOL/L (ref 23–29)
CREAT SERPL-MCNC: 5 MG/DL (ref 0.5–1.4)
DIFFERENTIAL METHOD BLD: ABNORMAL
EOSINOPHIL # BLD AUTO: 0.2 K/UL (ref 0–0.5)
EOSINOPHIL NFR BLD: 2.9 % (ref 0–8)
ERYTHROCYTE [DISTWIDTH] IN BLOOD BY AUTOMATED COUNT: 16.9 % (ref 11.5–14.5)
EST. GFR  (NO RACE VARIABLE): 9.5 ML/MIN/1.73 M^2
GLUCOSE SERPL-MCNC: 116 MG/DL (ref 70–110)
HCT VFR BLD AUTO: 35.1 % (ref 37–48.5)
HGB BLD-MCNC: 11 G/DL (ref 12–16)
IMM GRANULOCYTES # BLD AUTO: 0.01 K/UL (ref 0–0.04)
IMM GRANULOCYTES NFR BLD AUTO: 0.2 % (ref 0–0.5)
LYMPHOCYTES # BLD AUTO: 1 K/UL (ref 1–4.8)
LYMPHOCYTES NFR BLD: 19.2 % (ref 18–48)
MCH RBC QN AUTO: 30.5 PG (ref 27–31)
MCHC RBC AUTO-ENTMCNC: 31.3 G/DL (ref 32–36)
MCV RBC AUTO: 97 FL (ref 82–98)
MONOCYTES # BLD AUTO: 0.8 K/UL (ref 0.3–1)
MONOCYTES NFR BLD: 15.1 % (ref 4–15)
NEUTROPHILS # BLD AUTO: 3.2 K/UL (ref 1.8–7.7)
NEUTROPHILS NFR BLD: 61.8 % (ref 38–73)
NRBC BLD-RTO: 0 /100 WBC
PLATELET # BLD AUTO: 149 K/UL (ref 150–450)
PMV BLD AUTO: 12.4 FL (ref 9.2–12.9)
POCT GLUCOSE: 125 MG/DL (ref 70–110)
POCT GLUCOSE: 140 MG/DL (ref 70–110)
POCT GLUCOSE: 161 MG/DL (ref 70–110)
POCT GLUCOSE: 181 MG/DL (ref 70–110)
POCT GLUCOSE: 86 MG/DL (ref 70–110)
POTASSIUM SERPL-SCNC: 4.6 MMOL/L (ref 3.5–5.1)
RBC # BLD AUTO: 3.61 M/UL (ref 4–5.4)
SODIUM SERPL-SCNC: 132 MMOL/L (ref 136–145)
WBC # BLD AUTO: 5.11 K/UL (ref 3.9–12.7)

## 2024-05-14 PROCEDURE — 25000003 PHARM REV CODE 250: Mod: HCNC | Performed by: STUDENT IN AN ORGANIZED HEALTH CARE EDUCATION/TRAINING PROGRAM

## 2024-05-14 PROCEDURE — 36415 COLL VENOUS BLD VENIPUNCTURE: CPT | Mod: HCNC | Performed by: STUDENT IN AN ORGANIZED HEALTH CARE EDUCATION/TRAINING PROGRAM

## 2024-05-14 PROCEDURE — 25000003 PHARM REV CODE 250: Mod: HCNC | Performed by: NURSE PRACTITIONER

## 2024-05-14 PROCEDURE — 25000003 PHARM REV CODE 250: Mod: HCNC | Performed by: HOSPITALIST

## 2024-05-14 PROCEDURE — 30200315 PPD INTRADERMAL TEST REV CODE 302: Mod: HCNC | Performed by: INTERNAL MEDICINE

## 2024-05-14 PROCEDURE — 25000003 PHARM REV CODE 250: Mod: HCNC | Performed by: INTERNAL MEDICINE

## 2024-05-14 PROCEDURE — 92526 ORAL FUNCTION THERAPY: CPT | Mod: HCNC

## 2024-05-14 PROCEDURE — 21400001 HC TELEMETRY ROOM: Mod: HCNC

## 2024-05-14 PROCEDURE — 97530 THERAPEUTIC ACTIVITIES: CPT | Mod: HCNC

## 2024-05-14 PROCEDURE — 85025 COMPLETE CBC W/AUTO DIFF WBC: CPT | Mod: HCNC | Performed by: STUDENT IN AN ORGANIZED HEALTH CARE EDUCATION/TRAINING PROGRAM

## 2024-05-14 PROCEDURE — 99222 1ST HOSP IP/OBS MODERATE 55: CPT | Mod: HCNC,,, | Performed by: NURSE PRACTITIONER

## 2024-05-14 PROCEDURE — 80048 BASIC METABOLIC PNL TOTAL CA: CPT | Mod: HCNC | Performed by: STUDENT IN AN ORGANIZED HEALTH CARE EDUCATION/TRAINING PROGRAM

## 2024-05-14 PROCEDURE — 97535 SELF CARE MNGMENT TRAINING: CPT | Mod: HCNC

## 2024-05-14 PROCEDURE — 86580 TB INTRADERMAL TEST: CPT | Mod: HCNC | Performed by: INTERNAL MEDICINE

## 2024-05-14 RX ORDER — SODIUM CHLORIDE 9 MG/ML
INJECTION, SOLUTION INTRAVENOUS ONCE
Status: COMPLETED | OUTPATIENT
Start: 2024-05-15 | End: 2024-05-15

## 2024-05-14 RX ORDER — MUPIROCIN 20 MG/G
OINTMENT TOPICAL 2 TIMES DAILY
Status: DISPENSED | OUTPATIENT
Start: 2024-05-14 | End: 2024-05-19

## 2024-05-14 RX ADMIN — CLOPIDOGREL BISULFATE 75 MG: 75 TABLET ORAL at 09:05

## 2024-05-14 RX ADMIN — Medication 1 CAPSULE: at 12:05

## 2024-05-14 RX ADMIN — ALLOPURINOL 50 MG: 300 TABLET ORAL at 09:05

## 2024-05-14 RX ADMIN — TRAZODONE HYDROCHLORIDE 25 MG: 50 TABLET ORAL at 08:05

## 2024-05-14 RX ADMIN — OXYCODONE 5 MG: 5 TABLET ORAL at 02:05

## 2024-05-14 RX ADMIN — CARVEDILOL 6.25 MG: 6.25 TABLET, FILM COATED ORAL at 09:05

## 2024-05-14 RX ADMIN — MUPIROCIN: 20 OINTMENT TOPICAL at 08:05

## 2024-05-14 RX ADMIN — CARVEDILOL 6.25 MG: 6.25 TABLET, FILM COATED ORAL at 08:05

## 2024-05-14 RX ADMIN — FAMOTIDINE 20 MG: 20 TABLET ORAL at 09:05

## 2024-05-14 RX ADMIN — ATORVASTATIN CALCIUM 80 MG: 40 TABLET, FILM COATED ORAL at 09:05

## 2024-05-14 RX ADMIN — INSULIN ASPART 1 UNITS: 100 INJECTION, SOLUTION INTRAVENOUS; SUBCUTANEOUS at 12:05

## 2024-05-14 RX ADMIN — APIXABAN 5 MG: 5 TABLET, FILM COATED ORAL at 09:05

## 2024-05-14 RX ADMIN — APIXABAN 5 MG: 5 TABLET, FILM COATED ORAL at 08:05

## 2024-05-14 RX ADMIN — TUBERCULIN PURIFIED PROTEIN DERIVATIVE 5 UNITS: 5 INJECTION, SOLUTION INTRADERMAL at 11:05

## 2024-05-14 RX ADMIN — INSULIN ASPART 2 UNITS: 100 INJECTION, SOLUTION INTRAVENOUS; SUBCUTANEOUS at 05:05

## 2024-05-14 RX ADMIN — SERTRALINE HYDROCHLORIDE 100 MG: 50 TABLET ORAL at 09:05

## 2024-05-14 NOTE — PT/OT/SLP PROGRESS
"Physical Therapy Treatment/co treat with OT    Patient Name:  Suyapa Connelly   MRN:  [id number]    Recommendations:     Discharge Recommendations: Low Intensity Therapy  Discharge Equipment Recommendations: bedside commode (drop arm commode)  Barriers to discharge: Decreased caregiver support  Pt requires assist for mobility  Assessment:     Suyapa Connelly is a 58 y.o. female admitted with a medical diagnosis of Severe sepsis.  She presents with the following impairments/functional limitations: weakness, impaired endurance, impaired functional mobility, impaired balance, decreased lower extremity function, decreased upper extremity function, pain, decreased safety awareness, impaired skin . Pt is unsafe with functional mobility at this time due to pt requires max assist for bed mobility and CGA to minimal assist for sitting balance due to pt with anterior LOB initially and posterior instability when trying to scoot in the bed. Pt is motivated to progress with functional mobility.     Rehab Prognosis: Good; patient would benefit from acute skilled PT services to address these deficits and reach maximum level of function.    Recent Surgery: * No surgery found *      Plan:     During this hospitalization, patient to be seen 3 x/week to address the identified rehab impairments via therapeutic activities, therapeutic exercises, neuromuscular re-education and progress toward the following goals:    Plan of Care Expires:  06/08/24    Subjective   "It looks pretty outside"  Pain/Comfort:  Pain Rating 1:  (pt c/o pain "all over" when assisted with rolling to the L, pt unable to grade or specify where her pain was;)  Location - Orientation 1: generalized  Location 1:  ("all over")  Pain Addressed 1: Reposition, Cessation of Activity  Pain Rating Post-Intervention 1: 0/10 (upon return to supine)      Objective:     Communicated with nurse prior to session.  Patient found HOB elevated with telemetry upon PT entry to room. "     General Precautions: Standard, aspiration, fall  Orthopedic Precautions: N/A (pt has B LE AKA)  Braces: N/A  Respiratory Status: Room air     Functional Mobility:  Bed Mobility:     Rolling Left:  maximal assistance  Supine to Sit: maximal assistance  Sit to Supine: moderate assistance    AM-PAC 6 CLICK MOBILITY  Turning over in bed (including adjusting bedclothes, sheets and blankets)?: 2  Sitting down on and standing up from a chair with arms (e.g., wheelchair, bedside commode, etc.): 1  Moving from lying on back to sitting on the side of the bed?: 2  Moving to and from a bed to a chair (including a wheelchair)?: 2  Need to walk in hospital room?: 1  Climbing 3-5 steps with a railing?: 1  Basic Mobility Total Score: 9     Treatment & Education:   pt sat on the EOB ~ 20 min with minimal assist initially due to anterior instability. Pt was CGA sitting on the EOB to perform ADLs. Pt was able to scoot in sitting on the bed x 4 times posterior and 3 times to the L with minimal assist at times due to posterior instability. Pt was able to clear her buttocks off the bed during scooting. Pt received verbal and manual cues for upright posture.    Co-treat with OT due to medical complexity of pt and need for skilled hands for safe intervention.   Patient left HOB elevated with all lines intact, call button in reach, bed alarm on, and nurse notified..    GOALS:   Multidisciplinary Problems       Physical Therapy Goals          Problem: Physical Therapy    Goal Priority Disciplines Outcome Goal Variances Interventions   Physical Therapy Goal     PT, PT/OT Progressing     Description: Goals to be met by: 2024    Patient will increase functional independence with mobility by performin. Supine to sit with Stand-by Assistance  2. Sit to supine with Stand-by Assistance  3. Rolling to Left and Right with Stand-by Assistance  4. Lateral Scooting to Left and Right with Stand-by Assistance                         Time  Tracking:     PT Received On: 05/14/24  PT Start Time: 1114     PT Stop Time: 1140  PT Total Time (min): 26 min     Billable Minutes: Therapeutic Activity 26    Treatment Type: Treatment  PT/PTA: PT     Number of PTA visits since last PT visit: 0     05/14/2024

## 2024-05-14 NOTE — PLAN OF CARE
Problem: Fall Injury Risk  Goal: Absence of Fall and Fall-Related Injury  Outcome: Progressing     Problem: Restraint, Nonviolent  Goal: Absence of Harm or Injury  Outcome: Progressing     Problem: Adult Inpatient Plan of Care  Goal: Plan of Care Review  Outcome: Progressing  Goal: Patient-Specific Goal (Individualized)  Outcome: Progressing  Goal: Absence of Hospital-Acquired Illness or Injury  Outcome: Progressing  Goal: Optimal Comfort and Wellbeing  Outcome: Progressing  Goal: Readiness for Transition of Care  Outcome: Progressing     Problem: Bariatric Environmental Safety  Goal: Safety Maintained with Care  Outcome: Progressing     Problem: Diabetes Comorbidity  Goal: Blood Glucose Level Within Targeted Range  Outcome: Progressing     Problem: Sepsis/Septic Shock  Goal: Optimal Coping  Outcome: Progressing  Goal: Absence of Bleeding  Outcome: Progressing  Goal: Blood Glucose Level Within Targeted Range  Outcome: Progressing  Goal: Absence of Infection Signs and Symptoms  Outcome: Progressing  Goal: Optimal Nutrition Intake  Outcome: Progressing     Problem: Infection  Goal: Absence of Infection Signs and Symptoms  Outcome: Progressing     Problem: Wound  Goal: Optimal Coping  Outcome: Progressing  Goal: Optimal Functional Ability  Outcome: Progressing  Goal: Absence of Infection Signs and Symptoms  Outcome: Progressing  Goal: Improved Oral Intake  Outcome: Progressing  Goal: Optimal Pain Control and Function  Outcome: Progressing  Goal: Skin Health and Integrity  Outcome: Progressing  Goal: Optimal Wound Healing  Outcome: Progressing     Problem: Skin Injury Risk Increased  Goal: Skin Health and Integrity  Outcome: Progressing     Problem: Hemodialysis  Goal: Safe, Effective Therapy Delivery  Outcome: Progressing  Goal: Effective Tissue Perfusion  Outcome: Progressing  Goal: Absence of Infection Signs and Symptoms  Outcome: Progressing     Problem: Pain Acute  Goal: Optimal Pain Control and  Function  Outcome: Progressing

## 2024-05-14 NOTE — PT/OT/SLP PROGRESS
Speech Language Pathology Treatment/Discharge Summary    Patient Name:  Suyapa Connelly   MRN:  [id number]  Admitting Diagnosis: Severe sepsis    Recommendations:                 General Recommendations:  Follow-up not indicated  Diet recommendations:  Regular Diet - IDDSI Level 7, Liquid Diet Level: Thin liquids - IDDSI Level 0   Aspiration Precautions: Assistance with meals, Eliminate distractions, Feed only when awake/alert, HOB to 90 degrees, Meds whole 1 at a time, and Strict aspiration precautions   General Precautions: Standard, aspiration, fall  Communication strategies:  none    Assessment:     Suyapa Connelly is a 58 y.o. female who presents with a functional swallow for PO intake of an unmodified diet. No additional skilled speech services required at this time.      Subjective     Pt found resting in bed upon SLP entry into room. Pt agreeable to participate in all aspects of session.      Patient goals: none stated     Pain/Comfort:  Pain Rating 1: 0/10    Respiratory Status: Room air    Objective:     Has the patient been evaluated by SLP for swallowing?   Yes  Keep patient NPO? No   Current Respiratory Status:        Pt seen for ongoing dysphagia therapy. Pt awake and alert throughout session and reported ongoing tolerance of current regular diet with thin liquids. HOB elevated for all PO intake. SLP assisted pt with bites of cut sausage and open cup sips of thin liquids via straw with pt displaying adequate oral containment and no overt signs of airway compromise. MIDL oral residue noted following bites of dense breakfast potatoes though pt able to independently complete liquid wash to fully eliminate residue. SLP provided education regarding overall impressions, continuation of regular diet with thin liquids, and ongoing SLP POC. Pt verbalized understanding and had no additional questions or concerns upon SLP exit.      Goals:   Multidisciplinary Problems       SLP Goals          Problem: SLP     Goal Priority Disciplines Outcome   SLP Goal     SLP Progressing   Description: Speech Pathology Goals  To be met by 5/22/24    1. Pt will participate in ongoing diagnostic dysphagia therapy                               Plan:     Patient to be seen:  4 x/week   Plan of Care expires:  06/07/24  Plan of Care reviewed with:  patient   SLP Follow-Up:  No       Discharge recommendations:  No Therapy Indicated   Barriers to Discharge:  None    Time Tracking:     SLP Treatment Date:   05/14/24  Speech Start Time:  0916  Speech Stop Time:  0927     Speech Total Time (min):  11 min    Billable Minutes: Treatment Swallowing Dysfunction 11      05/14/2024

## 2024-05-14 NOTE — CARE UPDATE
I have reviewed the chart of Suyapa Connelly who is hospitalized for the following:    Active Hospital Problems    Diagnosis    *Severe sepsis    Thrombocytopenia     , trend daily      Severe obesity (BMI >= 40)     BMI 45      ESRD (end stage renal disease)    Hyperkalemia     K 5.7, ESRD patient, improved with HD      Chronic combined systolic and diastolic heart failure    Encephalopathy    Impaired functional mobility and endurance    Paroxysmal atrial fibrillation    Anemia due to chronic kidney disease, on chronic dialysis    Type 2 diabetes mellitus with hyperglycemia, with long-term current use of insulin    CAD (coronary artery disease)     Formatting of this note might be different from the original.  Last Assessment & Plan:   Formatting of this note might be different from the original.  -- Optimize glucose control.      CAROLIN (generalized anxiety disorder)    Peripheral vascular disease        Payton Rodríguez PA-C  [facility]

## 2024-05-14 NOTE — PLAN OF CARE
Called and spoke with Preethi  at [facility] (clinic [facility]) and was informed that she does have an opening on TTS shift @12:15pm but that their medical director will have to review patient's clinical information. Was told to call patient's home clinic on [address] to start the transfer process. Faxed clinical infor to Preethi at [phone].    Called Cynthia,  at [facility] [place] and was informed that she would start the transfer process but that she was sure that a decision would not be made today. Will follow up on tomorrow morning.         [facility]  Case Management  [phone]

## 2024-05-14 NOTE — PLAN OF CARE
Problem: Fall Injury Risk  Goal: Absence of Fall and Fall-Related Injury  Outcome: Progressing     Problem: Adult Inpatient Plan of Care  Goal: Plan of Care Review  Outcome: Progressing  Goal: Absence of Hospital-Acquired Illness or Injury  Outcome: Progressing  Goal: Optimal Comfort and Wellbeing  Outcome: Progressing     Problem: Bariatric Environmental Safety  Goal: Safety Maintained with Care  Outcome: Progressing     Problem: Diabetes Comorbidity  Goal: Blood Glucose Level Within Targeted Range  Outcome: Progressing     Problem: Sepsis/Septic Shock  Goal: Optimal Coping  Outcome: Progressing     Problem: Infection  Goal: Absence of Infection Signs and Symptoms  Outcome: Progressing     Problem: Wound  Goal: Optimal Coping  Outcome: Progressing     Problem: Pain Acute  Goal: Optimal Pain Control and Function  Outcome: Progressing   POC reviewed with pt who verbalized understanding. AAOx1. VSS on RA. IV ABX given per MAR. Pain managed with PRN meds per MAR. Turned with assist. BG monitored with coverage given per MAR. BMx2 this shift. Resting between care. Remains free of falls and injury. Avasys in use, bed alarm on, call light in reach, and rounds made for safety.  at bedside.

## 2024-05-14 NOTE — PLAN OF CARE
[facility] - Med Surg  Discharge Reassessment    Primary Care Provider: Magen Christensen MD    Expected Discharge Date: 5/15/2024      Patient remains inpatient due to continued need for medical management. Patient's dialysis chair is being transferred from [facility] to [facility] due to patient discharging to [facility]. Will continue to follow for post acute needs. Discharge Plan A and Plan B have been determined by review of patient's clinical status, future medical and therapeutic needs, and coverage/benefits for post-acute care in coordination with multidisciplinary team members.  Reassessment (most recent)       Discharge Reassessment - 05/14/24 1621          Discharge Reassessment    Assessment Type Discharge Planning Reassessment (P)      Did the patient's condition or plan change since previous assessment? No (P)      Discharge Plan discussed with: Patient;Spouse/sig other (P)      Name(s) and Number(s) Randell Connelly [phone] (P)      Communicated DEVAN with patient/caregiver Yes (P)      Discharge Plan A Skilled Nursing Facility (P)      Discharge Plan B Skilled Nursing Facility (P)      DME Needed Upon Discharge  none (P)      Transition of Care Barriers None (P)      Why the patient remains in the hospital Requires continued medical care (P)         Post-Acute Status    Post-Acute Authorization Placement (P)    SNF    Post-Acute Placement Status Referrals Sent (P)      Coverage [employer] (P)      Discharge Delays None known at this time (P)                        [facility]  Case Management  [phone]

## 2024-05-14 NOTE — PT/OT/SLP PROGRESS
Occupational Therapy   Co-Treatment    Name: Suyapa Connelly  MRN: [id number]  Admitting Diagnosis:  Severe sepsis       Recommendations:     Discharge Recommendations: No Therapy Indicated  Discharge Equipment Recommendations:  bedside commode  Barriers to discharge:  None    Assessment:     Suyapa Connelly is a 58 y.o. female with a medical diagnosis of Severe sepsis.  Performance deficits affecting function are weakness, impaired endurance, impaired self care skills, impaired functional mobility, impaired balance, impaired cognition, decreased coordination, decreased lower extremity function, decreased safety awareness, pain, orthopedic precautions, impaired coordination.     Rehab Prognosis:  Good; patient would benefit from acute skilled OT services to address these deficits and reach maximum level of function.       Plan:     Patient to be seen 2 x/week to address the above listed problems via self-care/home management, therapeutic activities, therapeutic exercises, neuromuscular re-education  Plan of Care Expires: 6/06/24  Plan of Care Reviewed with: patient    Subjective     Chief Complaint: Pain with bed mobility   Patient/Family Comments/goals: Pt.reports she is hurting (with posterior scooting)  Pain/Comfort:  Pain Rating 1:  (did not rate, increase with bed mob)  Arms and gluteus    Objective:     Communicated with: Nurse prior to session.  Patient found HOB elevated with telemetry upon OT entry to room.    General Precautions: Standard, fall, aspiration    Orthopedic Precautions:N/A  Braces: N/A  Respiratory Status: Room air     Occupational Performance:     Bed Mobility:    Patient completed Supine to Sit with maximal assistance  Posterior scooting SBA  Patient completed Sit to Supine with CGA    Functional Mobility/Transfers:  Pt. Sat at EOB ~ 20 mins with CGA-SBA   - Noted 3 LOB at EOB laterally with Min A - Mod A to recover     Activities of Daily Living:  Grooming: moderate assistance apply lotion  to UE and LE    Grooming: minimum assistance oral care and facial hygiene seated at EOB   Max VC for to task initiation and direction to attend to task awareness to       [facility] 6 Click ADL: 14    Treatment & Education:  Pt educated on role of occupational therapy, POC, and safety during ADLs and functional mobility. Pt and OT discussed importance of safe, continued mobility to optimize daily living skills. Pt verbalized understanding. Pt given instruction to call for medical staff/nurse for assistance.   Co-treatment with PT for maximal pt participation, safety, and activity tolerance     Patient left HOB elevated with all lines intact and call button in reach    GOALS:   Multidisciplinary Problems       Occupational Therapy Goals          Problem: Occupational Therapy    Goal Priority Disciplines Outcome Interventions   Occupational Therapy Goal     OT, PT/OT Progressing    Description: Goals to be met by: 6/6/24     Patient will increase functional independence with ADLs by performing:    Grooming while seated with Contact Guard Assistance.  Sitting at edge of bed 10 minutes with Supervision.  Rolling to Bilateral with [place].   Supine to sit with [place].                         Time Tracking:     OT Date of Treatment: 05/14/24  OT Start Time: 1113  OT Stop Time: 1140  OT Total Time (min): 27 min    Billable Minutes:Self Care/Home Management 27    OT/MACKENZIE: OT          5/14/2024

## 2024-05-14 NOTE — PLAN OF CARE
Problem: Occupational Therapy  Goal: Occupational Therapy Goal  Description: Goals to be met by: 6/6/24     Patient will increase functional independence with ADLs by performing:    Grooming while seated with Contact Guard Assistance.  Sitting at edge of bed 10 minutes with Supervision.  Rolling to Bilateral with [place].   Supine to sit with [place].    5/14/2024 1326 by Samantha Camara, OT  Outcome: Progressing  5/14/2024 1325 by Samantha Camara, OT  Outcome: Progressing

## 2024-05-14 NOTE — PLAN OF CARE
Problem: Occupational Therapy  Goal: Occupational Therapy Goal  Description: Goals to be met by: 6/6/24     Patient will increase functional independence with ADLs by performing:    Grooming while seated with Contact Guard Assistance.  Sitting at edge of bed 10 minutes with Supervision.  Rolling to Bilateral with [place].   Supine to sit with [place].    Outcome: Progressing

## 2024-05-14 NOTE — PROGRESS NOTES
[facility] Medicine  Progress note    [place] [facility] [place] Olga Washington MD  Admit Date: 5/7/2024  Code status: Full Code    Principal Problem:  Severe sepsis    Interval hx: Patient found at edge of bed. She complains of right lower back pain    PEx  Temp:  [97.1 °F (36.2 °C)-98.5 °F (36.9 °C)]   Pulse:  [70-79]   Resp:  [16-18]   BP: (103-132)/(57-69)   SpO2:  [94 %-97 %]     Intake/Output Summary (Last 24 hours) at 5/14/2024 1713  Last data filed at 5/14/2024 0642  Gross per 24 hour   Intake 625.81 ml   Output --   Net 625.81 ml       General Appearance: no acute distress, WD, elderly, ill-appearing  Heart: regular rate and rhythm, no heave  Respiratory: Normal respiratory effort, symmetric excursion, bilateral vesicular breath sounds   Abdomen: Soft, non-tender; bowel sounds active  Skin: intact, no rash, no ulcers  Neurologic:  No focal numbness or weakness  Mental status: Alert, oriented x 4, affect appropriate    Recent Labs   Lab 05/12/24  0458 05/13/24  0509 05/14/24  0454   WBC 4.00 4.76 5.11   HGB 11.4* 11.6* 11.0*   HCT 35.6* 36.9* 35.1*    172 149*     Recent Labs   Lab 05/08/24  0600 05/09/24  0408 05/10/24  0509 05/11/24  0508 05/12/24  0458 05/13/24  0509 05/14/24  0454    140 136   < > 134* 132* 132*   K 4.5 4.8 3.9   < > 5.0 5.7* 4.6    104 105   < > 103 102 99   CO2 18* 24 23   < > 19* 16* 23   BUN 59* 62* 27*   < > 46* 54* 35*   CREATININE 7.6* 7.9* 3.9*   < > 6.5* 7.1* 5.0*   GLU 95 95 116*   < > 110 191* 116*   CALCIUM 8.8 9.0 9.7   < > 8.7 8.9 8.2*   MG 2.2 2.3 2.0  --   --   --   --    PHOS 3.7 5.7* 2.8  --   --   --   --     < > = values in this interval not displayed.     Recent Labs   Lab 05/07/24  2312 05/08/24  0600 05/09/24  0408 05/10/24  0509   ALKPHOS  --  139* 129 170*   ALT  --  10 8* 9*   AST  --  22 18 26   ALBUMIN  --  2.2* 2.2* 2.5*   PROT  --  7.5 7.4 8.5*   BILITOT  --  0.9 0.9 0.7   INR 1.2  --   --   --         Recent Labs   Lab 05/13/24  1220  05/13/24  1757 05/14/24  0030 05/14/24  0629 05/14/24  1146 05/14/24  1656   POCTGLUCOSE 114* 173* 181* 140* 125* 161*       Scheduled Meds:   [START ON 5/15/2024] sodium chloride 0.9%   Intravenous Once    allopurinoL  50 mg Oral Every other day    apixaban  5 mg Oral BID    atorvastatin  80 mg Oral Daily    carvediloL  6.25 mg Oral BID    clopidogreL  75 mg Oral Daily    famotidine  20 mg Oral Daily    Lactobacillus rhamnosus GG  1 capsule Oral Daily    mupirocin   Nasal BID    sertraline  100 mg Oral Daily    traZODone  25 mg Oral QHS     Continuous Infusions:  As Needed:    Current Facility-Administered Medications:     dextrose 10%, 12.5 g, Intravenous, PRN    dextrose 10%, 25 g, Intravenous, PRN    glucagon (human recombinant), 1 mg, Intramuscular, PRN    hydrALAZINE, 25 mg, Oral, Q8H PRN    insulin aspart U-100, 0-10 Units, Subcutaneous, Q6H PRN    oxyCODONE, 5 mg, Oral, Q6H PRN    QUEtiapine, 25 mg, Oral, Daily PRN    sodium chloride 0.9%, 10 mL, Intravenous, PRN    Assessment and Plan  / Problems managed today    * Severe sepsis  - ID consulted   - 2/2 pan-sensitive E coli UTI; continue Ropcehin through 5/13 and end of 5/14     ESRD (end stage renal disease)  HD per nephro; appreciate assistance     Chronic combined systolic and diastolic heart failure  - Continue Coreg  - HD for volume status       Encephalopathy  - Vascular neurology consulted. Appreciate assistance   - CTH, MRI brain negative, Tox negative  - Delirium precautions   - Replace electrolytes to keep Mg > 2, Phos > 3, and K > 4  - Nephrology HD MWF, dialyzed 5/8/24    Appears to be functionally declining slowly. Unclear true baseline. Vascular neurology signed off.   Possibly acute exacerbation with UTI/sepsis. Continue antibiotics through 5/13 and end of 5/14   Improving daily, but slowly     Impaired functional mobility and endurance  - PT/OT consulted  - Possible SNF placement if she is a candidate. If not, will need to discuss halfway  care with family      Paroxysmal atrial fibrillation  - Continue coreg  - Continue Eliquis     Anemia due to chronic kidney disease, on chronic dialysis  - At Baseline  - Epo per nephro     CAD (coronary artery disease)  - Continue home plavix     Type 2 diabetes mellitus with hyperglycemia, with long-term current use of insulin  - Inpatient glucose goal 140-180  - Most Recent HbA1c: 8.0 in 4/2024   - MDSSI   - Glucose checks POCT qACHS ordered       CAROLIN (generalized anxiety disorder)  - Continue home sertraline and trazodone     Peripheral vascular disease  - Continue plavix       Diet:   regular diet  GI PPx: not needed  DVT PPx:  apixaban  Airways: room air  Wounds: none    Goals of Care:  Return to prior functional status     Discharge Planning   DEVAN: 5/15/2024   Is the patient medically ready for discharge?:     Reason for patient still in hospital (select all that apply): Patient trending condition and Treatment  Discharge Plan A: Skilled Nursing Facility   Discharge Delays: None known at this time    Olga Washington MD

## 2024-05-14 NOTE — PLAN OF CARE
Problem: Physical Therapy  Goal: Physical Therapy Goal  Description: Goals to be met by: 2024    Patient will increase functional independence with mobility by performin. Supine to sit with Stand-by Assistance  2. Sit to supine with Stand-by Assistance  3. Rolling to Left and Right with Stand-by Assistance  4. Lateral Scooting to Left and Right with Stand-by Assistance    Outcome: Progressing   Pt's goals remain appropriate and pt will continue to benefit from skilled PT services to work towards improved functional mobility including: bed mobility, transfers, and sitting balance. Jenna Gerardo PT  2024

## 2024-05-15 LAB
ALBUMIN SERPL BCP-MCNC: 2.3 G/DL (ref 3.5–5.2)
ANION GAP SERPL CALC-SCNC: 14 MMOL/L (ref 8–16)
BUN SERPL-MCNC: 39 MG/DL (ref 6–20)
CALCIUM SERPL-MCNC: 8.7 MG/DL (ref 8.7–10.5)
CHLORIDE SERPL-SCNC: 99 MMOL/L (ref 95–110)
CO2 SERPL-SCNC: 20 MMOL/L (ref 23–29)
CREAT SERPL-MCNC: 5.5 MG/DL (ref 0.5–1.4)
EST. GFR  (NO RACE VARIABLE): 8.5 ML/MIN/1.73 M^2
GLUCOSE SERPL-MCNC: 145 MG/DL (ref 70–110)
PHOSPHATE SERPL-MCNC: 4.8 MG/DL (ref 2.7–4.5)
POCT GLUCOSE: 124 MG/DL (ref 70–110)
POCT GLUCOSE: 179 MG/DL (ref 70–110)
POCT GLUCOSE: 189 MG/DL (ref 70–110)
POCT GLUCOSE: 194 MG/DL (ref 70–110)
POTASSIUM SERPL-SCNC: 4.8 MMOL/L (ref 3.5–5.1)
SODIUM SERPL-SCNC: 133 MMOL/L (ref 136–145)

## 2024-05-15 PROCEDURE — 25000003 PHARM REV CODE 250: Mod: HCNC | Performed by: NURSE PRACTITIONER

## 2024-05-15 PROCEDURE — 80100016 HC MAINTENANCE HEMODIALYSIS: Mod: HCNC

## 2024-05-15 PROCEDURE — 25000003 PHARM REV CODE 250: Mod: HCNC | Performed by: STUDENT IN AN ORGANIZED HEALTH CARE EDUCATION/TRAINING PROGRAM

## 2024-05-15 PROCEDURE — 97110 THERAPEUTIC EXERCISES: CPT | Mod: HCNC

## 2024-05-15 PROCEDURE — 80069 RENAL FUNCTION PANEL: CPT | Mod: HCNC | Performed by: INTERNAL MEDICINE

## 2024-05-15 PROCEDURE — 25000003 PHARM REV CODE 250: Mod: HCNC | Performed by: HOSPITALIST

## 2024-05-15 PROCEDURE — 36415 COLL VENOUS BLD VENIPUNCTURE: CPT | Mod: HCNC | Performed by: INTERNAL MEDICINE

## 2024-05-15 PROCEDURE — 21400001 HC TELEMETRY ROOM: Mod: HCNC

## 2024-05-15 PROCEDURE — 97530 THERAPEUTIC ACTIVITIES: CPT | Mod: HCNC

## 2024-05-15 PROCEDURE — 90935 HEMODIALYSIS ONE EVALUATION: CPT | Mod: HCNC,,, | Performed by: NURSE PRACTITIONER

## 2024-05-15 RX ADMIN — CARVEDILOL 6.25 MG: 6.25 TABLET, FILM COATED ORAL at 09:05

## 2024-05-15 RX ADMIN — CARVEDILOL 6.25 MG: 6.25 TABLET, FILM COATED ORAL at 12:05

## 2024-05-15 RX ADMIN — SERTRALINE HYDROCHLORIDE 100 MG: 50 TABLET ORAL at 12:05

## 2024-05-15 RX ADMIN — OXYCODONE 5 MG: 5 TABLET ORAL at 08:05

## 2024-05-15 RX ADMIN — TRAZODONE HYDROCHLORIDE 25 MG: 50 TABLET ORAL at 09:05

## 2024-05-15 RX ADMIN — ATORVASTATIN CALCIUM 80 MG: 40 TABLET, FILM COATED ORAL at 12:05

## 2024-05-15 RX ADMIN — SODIUM CHLORIDE: 9 INJECTION, SOLUTION INTRAVENOUS at 09:05

## 2024-05-15 RX ADMIN — APIXABAN 5 MG: 5 TABLET, FILM COATED ORAL at 09:05

## 2024-05-15 RX ADMIN — CLOPIDOGREL BISULFATE 75 MG: 75 TABLET ORAL at 12:05

## 2024-05-15 RX ADMIN — FAMOTIDINE 20 MG: 20 TABLET ORAL at 12:05

## 2024-05-15 RX ADMIN — INSULIN ASPART 2 UNITS: 100 INJECTION, SOLUTION INTRAVENOUS; SUBCUTANEOUS at 05:05

## 2024-05-15 RX ADMIN — MUPIROCIN: 20 OINTMENT TOPICAL at 12:05

## 2024-05-15 RX ADMIN — Medication 1 CAPSULE: at 12:05

## 2024-05-15 RX ADMIN — APIXABAN 5 MG: 5 TABLET, FILM COATED ORAL at 12:05

## 2024-05-15 NOTE — CONSULTS
[facility] - [facility] S[facility]  Skin Integrity AVA  Consult Note    Patient Name: Suyapa Connelly  MRN: [id number]  Admission Date: 5/7/2024  Hospital Length of Stay: 7 days  Attending Physician: Olga Washington MD  Primary Care Provider: Magen Christensen MD     Inpatient consult to Skin Integrity  Practitioner  Consult performed by: Magda Tillman, NP  Consult ordered by: Onofre Spann MD        Subjective:     History of Present Illness:  Suyapa Connelly is a 58 year old female with hx of severe PVD ultimately requiring bilateral LE AKAs, ESRD on HD, IDDM2, CAD s/p CABG, Afib on eliquis, debility who initially presented to the ED via EMS after an episode of unresponsiveness at home. Patient was apparently at her mental baseline this morning, when she suddenly became unresponsive per . EMS was called and were unable to arouse patient. She was brought into the ED and intubated shortly after arrival. Of note, patient has had 6 different hospitalizations over the past year related to encephalopathy. Patient was most recently admitted 3/22-3/23/24 for evaluation of steady cognitive decline over the past year. It appears patient had declined over the past year to the extent that she is unable to perform independent ADLs.  not ready for NH placement as of yet.     In the ED, patient afebrile, SBP 120s, HR 80s, intubated at 40% FiO2 Peep 5. CBC grossly unchanged from baseline, no leukocytosis or worsening anemia. CMP without significant electrolyte abnormalities, BUN 59, BGL 130s. Lactic wnl. UA c/f infection with >100 wbc and many bacteria. UDS pan-negative. pH wnl and no hypercarbia on VBG. Patient admitted to MICU for continued management. Skin integrity AVA consulted for evaluation of skin injury.    Scheduled Meds:   [START ON 5/15/2024] sodium chloride 0.9%   Intravenous Once    allopurinoL  50 mg Oral Every other day    apixaban  5 mg Oral BID    atorvastatin  80 mg Oral Daily    carvediloL  6.25 mg  Oral BID    clopidogreL  75 mg Oral Daily    famotidine  20 mg Oral Daily    Lactobacillus rhamnosus GG  1 capsule Oral Daily    mupirocin   Nasal BID    sertraline  100 mg Oral Daily    traZODone  25 mg Oral QHS     Continuous Infusions:  PRN Meds:  Current Facility-Administered Medications:     dextrose 10%, 12.5 g, Intravenous, PRN    dextrose 10%, 25 g, Intravenous, PRN    glucagon (human recombinant), 1 mg, Intramuscular, PRN    hydrALAZINE, 25 mg, Oral, Q8H PRN    insulin aspart U-100, 0-10 Units, Subcutaneous, Q6H PRN    oxyCODONE, 5 mg, Oral, Q6H PRN    QUEtiapine, 25 mg, Oral, Daily PRN    sodium chloride 0.9%, 10 mL, Intravenous, PRN    Review of patient's allergies indicates:   Allergen Reactions    Ciprofloxacin Itching    Contrast media      Kidney injury    Iodine      Kidney injury    Pcn [penicillins]      Rash; tolerated ceftriaxone on 1/13/20        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    Osteomyelitis     Osteomyelitis of left foot 04/30/2021    Ulcer of left foot     Vaginal delivery     x1     Past Surgical History:   Procedure Laterality Date    ABOVE-KNEE AMPUTATION Left 5/18/2021    Procedure: AMPUTATION, ABOVE KNEE;  Surgeon: Teddy Huber MD;  Location: [address];  Service: Vascular;  Laterality: Left;    ABOVE-KNEE AMPUTATION Right 3/18/2022    Procedure: AMPUTATION, ABOVE KNEE;  Surgeon: DAYNE Florez II, MD;  Location: [address];  Service: Vascular;  Laterality: Right;    Angiogram - Right Extremity Right 7/9/15    angiogram-left leg  10/6/15    ANGIOGRAPHY OF LOWER EXTREMITY Left 4/29/2021    Procedure: ANGIOGRAM, LOWER EXTREMITY;  Surgeon: Teddy Huber MD;  Location: [address]  [facility];  Service: Vascular;  Laterality: Left;    BELOW KNEE AMPUTATION OF LOWER EXTREMITY Right 12/28/2021    Procedure: AMPUTATION, BELOW KNEE;  Surgeon: Kaitlyn Rojas MD;  Location: [facility];  Service: General;  Laterality: Right;    CATHETERIZATION OF BOTH LEFT AND RIGHT HEART N/A 12/18/2019    Procedure: CATHETERIZATION, HEART, BOTH LEFT AND RIGHT;  Surgeon: Que Fernando III, MD;  Location: [facility] CATH LAB;  Service: Cardiology;  Laterality: N/A;    CORONARY ANGIOGRAPHY N/A 12/18/2019    Procedure: ANGIOGRAM, CORONARY ARTERY;  Surgeon: Que Fernando III, MD;  Location: [facility] CATH LAB;  Service: Cardiology;  Laterality: N/A;    CORONARY ANGIOGRAPHY INCLUDING BYPASS GRAFTS WITH CATHETERIZATION OF LEFT HEART N/A 7/28/2020    Procedure: ANGIOGRAM, CORONARY, INCLUDING BYPASS GRAFT, WITH LEFT HEART CATHETERIZATION, 9 am;  Surgeon: Rachel Easley MD;  Location: [facility] CATH LAB;  Service: Cardiology;  Laterality: N/A;    CORONARY ARTERY BYPASS GRAFT (CABG) N/A 1/14/2020    Procedure: CORONARY ARTERY BYPASS GRAFT (CABG) x 1     Off Pump;  Surgeon: Huang Altamirano MD;  [facility];  Service: Cardiovascular;  Laterality: N/A;    CREATION OF FEMORAL-TIBIAL ARTERY BYPASS Left 4/29/2021    Procedure: CREATION, BYPASS, ARTERIAL, FEMORAL TO ANTERIOR TIBIAL;  Surgeon: Teddy Huber MD;  [facility];  Service: Vascular;  Laterality: Left;    CREATION OF FEMOROPOPLITEAL ARTERIAL BYPASS USING GRAFT Left 8/18/2020    Procedure: CREATION, BYPASS, ARTERIAL, FEMORAL TO POPLITEAL, USING GRAFT, LEFT LOWER EXTREMITY;  Surgeon: Teddy Huber MD;  Location: [facility];  Service: Vascular;  Laterality: Left;  REQUEST 7:15 A.M. START----COVID NEGATIVE ON 8/17  1ST CASE STARTE PER LEANA ON 8/7/2020 @ 942AM-LO  RN PREOP 8/12/2020   T/S-----CLEARED BY CARDS-------PENDING INSURANCE    DEBRIDEMENT OF FOOT Left 9/8/2020    Procedure: DEBRIDEMENT, FOOT;  Surgeon: Rosio Mayes DPM;  Location:  [facility] OR;  Service: Podiatry;  Laterality: Left;  request neoxx .   RN Pre Op 9-4-2020, Covid negative on 9/5/20. C A    DEBRIDEMENT OF FOOT  3/4/2021    Procedure: DEBRIDEMENT, FOOT;  Surgeon: Teddy Huber MD;  Location: [facility] OR;  Service: Vascular;;    DEBRIDEMENT OF FOOT Left 3/9/2021    Procedure: DEBRIDEMENT, FOOT, bone biopsy;  Surgeon: Rosio Mayes DPM;  Location: [facility] OR;  Service: Podiatry;  Laterality: Left;  Request neoxx---COVID IN AM  REQUESTING NOON START  RN Phone Pre op.On Blood thinners Plavix and Eliquis.  Covid am of surgery. C A    DEBRIDEMENT OF FOOT Left 5/4/2021    Procedure: DEBRIDEMENT, FOOT;  Surgeon: Farooq Morley DPM;  [address]  Service: Podiatry;  Laterality: Left;    INSERTION OF TUNNELED CENTRAL VENOUS HEMODIALYSIS CATHETER N/A 1/27/2020    Procedure: Insertion, Catheter, Central Venous, Hemodialysis;  Surgeon: ESTEBAN Gomez III, MD;  Location: [facility] CATH LAB;  Service: Peripheral Vascular;  Laterality: N/A;    INSERTION OF TUNNELED CENTRAL VENOUS HEMODIALYSIS CATHETER  5/10/2023    Procedure: Insertion, Catheter, Central Venous, Hemodialysis;  Surgeon: Romulo Queen MD;  Location: [facility] CATH LAB;  Service: Cardiology;;    PERCUTANEOUS TRANSLUMINAL ANGIOPLASTY N/A 3/4/2021    Procedure: PTA (ANGIOPLASTY, PERCUTANEOUS, TRANSLUMINAL);  Surgeon: Teddy Huber MD;  Location: [facility] OR;  Service: Vascular;  Laterality: N/A;    REMOVAL OF ARTERIOVENOUS GRAFT Left 5/27/2021    Procedure: REMOVAL, GRAFT, LEFT LOWER EXTREMITY, WOUND EXPLORATION;  Surgeon: Teddy Huber MD;  Location: [facility];  Service: Vascular;  Laterality: Left;    REMOVAL OF NAIL OF DIGIT Left 3/9/2021    Procedure: REMOVAL, NAIL, DIGIT;  Surgeon: Rosio Mayes DPM;  Location: [facility];  Service: Podiatry;  Laterality: Left;    RIGHT HEART CATHETERIZATION Right 5/10/2023    Procedure: INSERTION, CATHETER, RIGHT HEART;  Surgeon: Romulo Queen MD;  Location: [facility]  CATH LAB;  Service: Cardiology;  Laterality: Right;    THROMBECTOMY Left 3/4/2021    Procedure: THROMBECTOMY, LEFT LOWER EXTREMITY BYPASS GRAFT, ANGIOGRAM, POSSIBLE INTERVENTION, POSSIBLE LEFT LOWER EXTREMITY BYPASS;  Surgeon: Teddy Hubre MD;  Location: [facility] OR;  Service: Vascular;  Laterality: Left;    THROMBECTOMY Left 4/29/2021    Procedure: GRAFT THROMBECTOMY, LEFT LOWER EXTREMITY;  Surgeon: Teddy Huber MD;  [facility];  Service: Vascular;  Laterality: Left;  14.5 min  1179.85 mGy  341.01 Gycm2  240 ml dye    THROMBECTOMY  10/22/2021    Procedure: THROMBECTOMY;  Surgeon: Saad Arenas MD;  Location: [facility] CATH LAB/EP;  Service: Cardiology;;       Family History       Problem Relation (Age of Onset)    Diabetes Mother, Father, Paternal Grandmother    Heart disease Maternal Grandmother    No Known Problems Maternal Grandfather, Paternal Grandfather          Tobacco Use    Smoking status: Former    Smokeless tobacco: Never   Substance and Sexual Activity    Alcohol use: No    Drug use: Yes     Types: Marijuana     Comment: occassional    Sexual activity: Yes     Partners: Male     Review of Systems   Skin:  Positive for wound.       Objective:     Vital Signs (Most Recent):  Temp: 97.6 °F (36.4 °C) (05/14/24 1928)  Pulse: 76 (05/14/24 1928)  Resp: 18 (05/14/24 1928)  BP: 135/66 (05/14/24 1928)  SpO2: (!) 91 % (05/14/24 1928) Vital Signs (24h Range):  Temp:  [97.1 °F (36.2 °C)-98.2 °F (36.8 °C)] 97.6 °F (36.4 °C)  Pulse:  [70-78] 76  Resp:  [16-18] 18  SpO2:  [91 %-97 %] 91 %  BP: (103-135)/(57-69) 135/66     Weight: 68.1 kg (150 lb 2.1 oz)  Body mass index is 45.81 kg/m².     Physical Exam  Constitutional:       Appearance: Normal appearance.   Skin:     General: Skin is warm and dry.      Findings: Lesion present.   Neurological:      Mental Status: She is alert.          Laboratory:  All pertinent labs reviewed within the last 24 hours.    Diagnostic  Results:  None      Assessment/Plan:         AVA Skin Integrity Evaluation      Skin Integrity AVA evaluation of patient as part of the comprehensive skin care team.     She has been admitted for 7 days. Skin injury was noted on 5/8/24. POA yes.    L sacral/buttocks      L upper sacral region        Derm  Irritant contact dermatitis due to friction or contact with body fluids, unspecified  - consult received for evaluation of skin injury.  - pt presented to the ED via EMS after an episode of unresponsiveness at home.  - scattered partial thickness tissue loss and ruptured blister with pink, red, moist wound base to sacrum and left lower buttocks.  - likely related to friction and moisture.  - continue Triad bid/prn. No foam dressings. Sacral foams removed.  - damion surface.  - wedge for offloading.  - pt turns well. Encouraged to turn q2h.  - leodan score documented at 15 with a nutrition sub scale score of 3.  - nursing to maintain pressure injury prevention measures and continue wound care per orders.    Orthopedic  Pressure injury of sacral region, unstageable  - left upper sacral region wound with black eschar to wound base.  - unstageable pressure injury of sacrum.  - continue Triad bid/prn.  - will order specialty surface.        Thank you for your consult. I will follow-up with patient. Please contact us if you have any additional questions.      Magda Tillman NP  Skin Integrity AVA  [facility] - [facility]

## 2024-05-15 NOTE — PROGRESS NOTES
[facility] - Med Surg  Adult Nutrition  Progress Note    SUMMARY       Recommendations    1. Continue 2000 Calorie Diabetic/Cardiac Diet. High-calorie, high-protein food sources encouraged.   2. Recommend Boost GC QD for additional calories/PRO.   3. Recommend Beneprotein BID, with the addition of renal formula MVI and zinc to help promote wound healing. Adequate fluid intake encouraged.   4. Recommend phosphate binder with all meals/snacks.   5. RD following    Goals: Meet % EEN, EPN by RD f/u date  Nutrition Goal Status: new  Communication of RD Recs: other (comment) (POC)    Assessment and Plan    Endocrine  Moderate malnutrition-resolved as of 5/10/2024  Malnutrition Type:  Context: acute illness or injury  Level: moderate    Related to (etiology):   Inability to consume sufficient energy     Signs and Symptoms (as evidenced by):   Weight loss, NFPE    Malnutrition Characteristic Summary:  Weight Loss (Malnutrition): 10% in 6 months  Subcutaneous Fat (Malnutrition): mild depletion  Muscle Mass (Malnutrition): mild depletion    Interventions/Recommendations (treatment strategy):  Collaboration of nutrition care w/ other providers    Nutrition Diagnosis Status:   Resolved         Malnutrition Assessment  Malnutrition Context: acute illness or injury  Malnutrition Level: moderate          Weight Loss (Malnutrition): 10% in 6 months  Subcutaneous Fat (Malnutrition): mild depletion  Muscle Mass (Malnutrition): mild depletion                         Reason for Assessment    Reason For Assessment: RD follow-up  Diagnosis: other (see comments) (Sepsis)  Relevant Medical History: B/L AKAs, ESRD on HD, DM  Interdisciplinary Rounds: attended  General Information Comments: RD follow-up. Pt unavailable at RD interview, CASANDRA for dialysis. Tolerating PO intake consuming % at meals. No c/o N/V/C/D, denies difficulty chewing/swallowing.  Nutrition Discharge Planning: Pending clinical course    Nutrition Risk  Screen    Nutrition Risk Screen: no indicators present    Nutrition/Diet History    Patient Reported Diet/Restrictions/Preferences: low salt  Spiritual, Cultural Beliefs, Druze Practices, Values that Affect Care: no  Factors Affecting Nutritional Intake: None identified at this time    Anthropometrics    Temp: 97.1 °F (36.2 °C)  Height Method: Stated  Height: 4' (121.9 cm)  Height (inches): 48 in  Weight Method: Bed Scale  Weight: 69.1 kg (152 lb 5.4 oz)  Weight (lb): 152.34 lb  Ideal Body Weight (IBW), Female: 40 lb  % Ideal Body Weight, Female (lb): 385.8 %  BMI (Calculated): 46.5  BMI Grade: greater than 40 - morbid obesity  Usual Body Weight (UBW), k.5 kg  % Usual Body Weight: 90.25  % Weight Change From Usual Weight: -9.94 %  Amputation %: 16, 16  Total Amputation %: 32  Amputation Ideal Body Weight (IBW), Female (lb): 8 lb  Amputee BMI (kg/m2): 69.24 kg/m2       Lab/Procedures/Meds    Pertinent Labs Reviewed: reviewed  Pertinent Labs Comments: Na 133, CO2 20, BUN 39, Cr 5.5, GFR 8.5, Glu 145, P 4.8  Pertinent Medications Reviewed: reviewed  Pertinent Medications Comments: lactobacillus, NaCl, allopurinol, atorvastatin, carvedilol, clopidogrel, sertraline, famotidine, trazodone      Estimated/Assessed Needs    Weight Used For Calorie Calculations: 69.8 kg (153 lb 14.1 oz)  Energy Calorie Requirements (kcal): 7817-4905 kcal/d  Energy Need Method: Kcal/kg (25-30 kcal/kg)  Protein Requirements: 84 g/d (1.2 g/kg)  Weight Used For Protein Calculations: 69.8 kg (153 lb 14.1 oz)     Estimated Fluid Requirement Method: other (see comments) (Per MD)  RDA Method (mL): 1745  CHO Requirement: 240g      Nutrition Prescription Ordered    Current Diet Order: 2000 Calorie diabetic/ cardiac diet    Evaluation of Received Nutrient/Fluid Intake    I/O: +240 mL  Energy Calories Required: meeting needs  Protein Required: meeting needs  Comments: LBM:   Tolerance: tolerating  % Intake of Estimated Energy Needs: 75 - 100  %  % Meal Intake: 75 - 100 %    Nutrition Risk    Level of Risk/Frequency of Follow-up:  (1x/week)     Monitor and Evaluation    Food and Nutrient Intake: enteral nutrition intake, food and beverage intake, energy intake  Food and Nutrient Adminstration: diet order, enteral and parenteral nutrition administration  Physical Activity and Function: nutrition-related ADLs and IADLs  Anthropometric Measurements: weight, weight change  Biochemical Data, Medical Tests and Procedures: inflammatory profile, lipid profile, glucose/endocrine profile, electrolyte and renal panel, gastrointestinal profile  Nutrition-Focused Physical Findings: overall appearance     Nutrition Follow-Up    RD Follow-up?: Yes    Blanca Zavala MS, RD, LDN

## 2024-05-15 NOTE — HPI
Suyapa Connelly is a 58 year old female with hx of severe PVD ultimately requiring bilateral LE AKAs, ESRD on HD, IDDM2, CAD s/p CABG, Afib on eliquis, debility who initially presented to the ED via EMS after an episode of unresponsiveness at home. Patient was apparently at her mental baseline this morning, when she suddenly became unresponsive per . EMS was called and were unable to arouse patient. She was brought into the ED and intubated shortly after arrival. Of note, patient has had 6 different hospitalizations over the past year related to encephalopathy. Patient was most recently admitted 3/22-3/23/24 for evaluation of steady cognitive decline over the past year. It appears patient had declined over the past year to the extent that she is unable to perform independent ADLs.  not ready for NH placement as of yet.     In the ED, patient afebrile, SBP 120s, HR 80s, intubated at 40% FiO2 Peep 5. CBC grossly unchanged from baseline, no leukocytosis or worsening anemia. CMP without significant electrolyte abnormalities, BUN 59, BGL 130s. Lactic wnl. UA c/f infection with >100 wbc and many bacteria. UDS pan-negative. pH wnl and no hypercarbia on VBG. Patient admitted to MICU for continued management. Skin integrity AVA consulted for evaluation of skin injury.

## 2024-05-15 NOTE — ASSESSMENT & PLAN NOTE
- consult received for evaluation of skin injury.  - pt presented to the ED via EMS after an episode of unresponsiveness at home.  - scattered partial thickness tissue loss and ruptured blister with pink, red, moist wound base to sacrum and left lower buttocks.  - likely related to friction and moisture.  - continue Triad bid/prn. No foam dressings. Sacral foams removed.  - damion surface.  - wedge for offloading.  - pt turns well. Encouraged to turn q2h.  - leodan score documented at 15 with a nutrition sub scale score of 3.  - nursing to maintain pressure injury prevention measures and continue wound care per orders.

## 2024-05-15 NOTE — PLAN OF CARE
Problem: Physical Therapy  Goal: Physical Therapy Goal  Description: Goals to be met by: 2024    Patient will increase functional independence with mobility by performin. Supine to sit with Stand-by Assistance  2. Sit to supine with Stand-by Assistance  3. Rolling to Left and Right with Stand-by Assistance  4. Lateral Scooting to Left and Right with Stand-by Assistance  5. Pt to be able to sit on the EOB for 10 min with no assist while performing activities with her UEs. -not met    Outcome: Progressing   Pt's goals revised appropriate and pt will continue to benefit from skilled PT services to work towards improved functional mobility including: bed mobility, transfers, and sitting balance. Jenna Gerardo PT  5/15/2024

## 2024-05-15 NOTE — PLAN OF CARE
Called Bea at [facility] and inquired about SNF authorization from insurance company and was told that case is still pending.    [facility] asking the status of auth# [id number].    Received a call back from Bea informing me that Jose Manuel is asking for additional clinical information to complete case.         [facility]  Case Management  [phone]

## 2024-05-15 NOTE — ASSESSMENT & PLAN NOTE
- left upper sacral region wound with black eschar to wound base.  - unstageable pressure injury of sacrum.  - continue Triad bid/prn.  - will order specialty surface.

## 2024-05-15 NOTE — PT/OT/SLP PROGRESS
Physical Therapy Treatment    Patient Name:  Suyapa Connelly   MRN:  [id number]    Recommendations:     Discharge Recommendations: Moderate Intensity Therapy  Discharge Equipment Recommendations: bedside commode (drop arm commode) Patient has a mobility limitation that significantly impairs their ability to participate in one or more mobility related activities of daily living, including toileting. This deficit can be resolved by using a bedside commode. Patient demonstrates mobility limitations that will cause them to be confined to one room at home without bathroom access for up to 30 days. Using a bedside commode will greatly improve the patient's ability to participate in MRADLs.   Barriers to discharge: Decreased caregiver support  Requires assist for mobility  Assessment:     Suyapa Connelly is a 58 y.o. female admitted with a medical diagnosis of Severe sepsis.  She presents with the following impairments/functional limitations: weakness, impaired endurance, impaired self care skills, impaired functional mobility, decreased coordination, decreased safety awareness, decreased upper extremity function, decreased lower extremity function, impaired balance . Pt is unsafe with functional mobility at this time due to pt requires moderate assist for bed mobility and CGA to minimal assist for sitting balance due to posterior instability at times. Pt is motivated to progress with functional mobility.     Rehab Prognosis: Fair; patient would benefit from acute skilled PT services to address these deficits and reach maximum level of function.    Recent Surgery: * No surgery found *      Plan:     During this hospitalization, patient to be seen 3 x/week to address the identified rehab impairments via therapeutic activities, therapeutic exercises, neuromuscular re-education and progress toward the following goals:    Plan of Care Expires:  06/08/24    Subjective   Pt without complaints when asked  Patient/Family  Comments/goals: Pt's  states he wanted her to improve her mobility   Pain/Comfort:  Pain Rating 1: 0/10 (pt did not c/o pain during treatment)  Pain Rating Post-Intervention 1: 0/10      Objective:     Communicated with nurse prior to session.  Patient found HOB elevated with telemetry upon PT entry to room.     General Precautions: Standard, aspiration, fall  Orthopedic Precautions: N/A (pt has B AKA)  Braces: N/A  Respiratory Status: Room air     Functional Mobility:  Bed Mobility:     Supine to Sit: moderate assistance  Sit to Supine: minimum assistance  Scooting in sitting: pt scooted to the L and R x 3 trials each way, and forward x 2 trials and backwards x 3 trials with CGA to minimal assist due to posterior instability at times.    AM-PAC 6 CLICK MOBILITY  Turning over in bed (including adjusting bedclothes, sheets and blankets)?: 3  Sitting down on and standing up from a chair with arms (e.g., wheelchair, bedside commode, etc.): 1  Moving from lying on back to sitting on the side of the bed?: 2  Moving to and from a bed to a chair (including a wheelchair)?: 2  Need to walk in hospital room?: 1  Climbing 3-5 steps with a railing?: 1  Basic Mobility Total Score: 10     Treatment & Education:   pt sat in long sitting in the bed ~ 20 min with minimal assist initially due to posterior lean. Pt progressed to requiring CGA but had LOB posterior with UE exs at times causing LOB posterior. Pt received verbal and manual cues for upright posture. Pt performed B UE exs x 10 reps: sh flex, sh abd, and reaching exs to promote weight shifting.     Patient left HOB elevated with all lines intact, call button in reach, and nurse notified..    GOALS:   Multidisciplinary Problems       Physical Therapy Goals          Problem: Physical Therapy    Goal Priority Disciplines Outcome Goal Variances Interventions   Physical Therapy Goal     PT, PT/OT Progressing     Description: Goals to be met by: 5/25/2024    Patient will  increase functional independence with mobility by performin. Supine to sit with Stand-by Assistance  2. Sit to supine with Stand-by Assistance  3. Rolling to Left and Right with Stand-by Assistance  4. Lateral Scooting to Left and Right with Stand-by Assistance  5. Pt to be able to sit on the EOB for 10 min with no assist while performing activities with her UEs. -not met                         Time Tracking:     PT Received On: 05/15/24  PT Start Time: 821     PT Stop Time: 851  PT Total Time (min): 30 min     Billable Minutes: Therapeutic Activity 20 and Therapeutic Exercise 10    Treatment Type: Treatment  PT/PTA: PT     Number of PTA visits since last PT visit: 0     05/15/2024

## 2024-05-15 NOTE — PROGRESS NOTES
[facility] Medicine  Progress note    Team: [facility]OSP MED W Olga Washington MD  Admit Date: 5/7/2024  Code status: Full Code    Principal Problem:  Severe sepsis    Interval hx: No new events    PEx  Temp:  [97.1 °F (36.2 °C)-98.3 °F (36.8 °C)]   Pulse:  [74-98]   Resp:  [16-18]   BP: (125-168)/()   SpO2:  [91 %-97 %]     Intake/Output Summary (Last 24 hours) at 5/15/2024 1654  Last data filed at 5/15/2024 1224  Gross per 24 hour   Intake 494.5 ml   Output 1917 ml   Net -1422.5 ml       General Appearance: no acute distress, WD, elderly, ill-appearing  Heart: regular rate and rhythm, no heave  Respiratory: Normal respiratory effort, symmetric excursion, bilateral vesicular breath sounds   Abdomen: Soft, non-tender; bowel sounds active  Skin: intact, no rash, no ulcers  Neurologic:  No focal numbness or weakness  Mental status: Alert, oriented x 4, affect appropriate    Recent Labs   Lab 05/12/24  0458 05/13/24  0509 05/14/24  0454   WBC 4.00 4.76 5.11   HGB 11.4* 11.6* 11.0*   HCT 35.6* 36.9* 35.1*    172 149*     Recent Labs   Lab 05/09/24  0408 05/10/24  0509 05/11/24  0508 05/13/24  0509 05/14/24  0454 05/15/24  0306    136   < > 132* 132* 133*   K 4.8 3.9   < > 5.7* 4.6 4.8    105   < > 102 99 99   CO2 24 23   < > 16* 23 20*   BUN 62* 27*   < > 54* 35* 39*   CREATININE 7.9* 3.9*   < > 7.1* 5.0* 5.5*   GLU 95 116*   < > 191* 116* 145*   CALCIUM 9.0 9.7   < > 8.9 8.2* 8.7   MG 2.3 2.0  --   --   --   --    PHOS 5.7* 2.8  --   --   --  4.8*    < > = values in this interval not displayed.     Recent Labs   Lab 05/09/24  0408 05/10/24  0509 05/15/24  0306   ALKPHOS 129 170*  --    ALT 8* 9*  --    AST 18 26  --    ALBUMIN 2.2* 2.5* 2.3*   PROT 7.4 8.5*  --    BILITOT 0.9 0.7  --         Recent Labs   Lab 05/14/24  1146 05/14/24  1656 05/14/24  2035 05/15/24  0711 05/15/24  1221 05/15/24  1626   POCTGLUCOSE 125* 161* 86 189* 124* 179*       Scheduled Meds:   allopurinoL  50 mg Oral Every other day     apixaban  5 mg Oral BID    atorvastatin  80 mg Oral Daily    carvediloL  6.25 mg Oral BID    clopidogreL  75 mg Oral Daily    famotidine  20 mg Oral Daily    Lactobacillus rhamnosus GG  1 capsule Oral Daily    mupirocin   Nasal BID    sertraline  100 mg Oral Daily    traZODone  25 mg Oral QHS     Continuous Infusions:  As Needed:    Current Facility-Administered Medications:     dextrose 10%, 12.5 g, Intravenous, PRN    dextrose 10%, 25 g, Intravenous, PRN    glucagon (human recombinant), 1 mg, Intramuscular, PRN    hydrALAZINE, 25 mg, Oral, Q8H PRN    insulin aspart U-100, 0-10 Units, Subcutaneous, Q6H PRN    oxyCODONE, 5 mg, Oral, Q6H PRN    QUEtiapine, 25 mg, Oral, Daily PRN    sodium chloride 0.9%, 10 mL, Intravenous, PRN    Assessment and Plan  / Problems managed today    * Severe sepsis  - ID consulted   - 2/2 pan-sensitive E coli UTI; continue Ropcehin through 5/13 and end of 5/14     ESRD (end stage renal disease)  HD per nephro; appreciate assistance     Chronic combined systolic and diastolic heart failure  - Continue Coreg  - HD for volume status       Encephalopathy  - Vascular neurology consulted. Appreciate assistance   - CTH, MRI brain negative, Tox negative  - Delirium precautions   - Replace electrolytes to keep Mg > 2, Phos > 3, and K > 4  - Nephrology HD MWF, dialyzed 5/8/24    Appears to be functionally declining slowly. Unclear true baseline. Vascular neurology signed off.   Possibly acute exacerbation with UTI/sepsis. Continue antibiotics through 5/13 and end of 5/14   Improving daily, but slowly     Impaired functional mobility and endurance  - PT/OT consulted  - Possible SNF placement if she is a candidate. If not, will need to discuss retirement care with family      Paroxysmal atrial fibrillation  - Continue coreg  - Continue Eliquis     Anemia due to chronic kidney disease, on chronic dialysis  - At Baseline  - Epo per nephro     CAD (coronary artery disease)  - Continue home plavix      Type 2 diabetes mellitus with hyperglycemia, with long-term current use of insulin  - Inpatient glucose goal 140-180  - Most Recent HbA1c: 8.0 in 4/2024   - MDSSI   - Glucose checks POCT qACHS ordered       CAROLIN (generalized anxiety disorder)  - Continue home sertraline and trazodone     Peripheral vascular disease  - Continue plavix       Diet:   regular diet  GI PPx: not needed  DVT PPx:  apixaban  Airways: room air  Wounds: none    Goals of Care:  Return to prior functional status     Discharge Planning   DEVAN: 5/16/2024   Is the patient medically ready for discharge?:     Reason for patient still in hospital (select all that apply): Patient trending condition and Treatment  Discharge Plan A: Skilled Nursing Facility   Discharge Delays: None known at this time    Olga Washington MD

## 2024-05-15 NOTE — PLAN OF CARE
Recommendations    1. Continue 2000 Calorie Diabetic/Cardiac Diet. High-calorie, high-protein food sources encouraged.   2. Recommend Boost GC QD for additional calories/PRO.   3. Recommend Beneprotein BID, with the addition of renal formula MVI and zinc to help promote wound healing. Adequate fluid intake encouraged.   4. Recommend phosphate binder with all meals/snacks.   5. RD following    Goals: Meet % EEN, EPN by RD f/u date  Nutrition Goal Status: new  Communication of RD Recs: other (comment) (POC)

## 2024-05-15 NOTE — PROGRESS NOTES
[facility] STAFF HEMODIALYSIS NOTE     Patient currently on hemodialysis for removal of uremic toxins and volume.     Patient seen and evaluated on hemodialysis, tolerating treatment, see HD flowsheet for vitals and assessments.    Labs have been reviewed and the dialysate bath has been adjusted.       Assessment/Plan:      -Patient seen on HD, tolerating treatment well, w/o complaints   -UF goal of 2L  -Renal diet, if not NPO   -Strict I/O's and daily weights  -Daily renal function panels  -Keep MAP >65 while on HD   -Hgb goal 10-11, at goal   -Will continue to follow while inpatient     Katie Monique DNP-FNP, C  Nephrology  Pager: [phone]

## 2024-05-15 NOTE — NURSING
0902- pt off unit via stretcher going to HD  1220- pt back on [facility]

## 2024-05-15 NOTE — PLAN OF CARE
Problem: Fall Injury Risk  Goal: Absence of Fall and Fall-Related Injury  Outcome: Progressing     Problem: Restraint, Nonviolent  Goal: Absence of Harm or Injury  Outcome: Progressing     Problem: Adult Inpatient Plan of Care  Goal: Plan of Care Review  Outcome: Progressing  Goal: Patient-Specific Goal (Individualized)  Outcome: Progressing  Goal: Absence of Hospital-Acquired Illness or Injury  Outcome: Progressing  Goal: Optimal Comfort and Wellbeing  Outcome: Progressing  Goal: Readiness for Transition of Care  Outcome: Progressing     Problem: Bariatric Environmental Safety  Goal: Safety Maintained with Care  Outcome: Progressing     Problem: Diabetes Comorbidity  Goal: Blood Glucose Level Within Targeted Range  Outcome: Progressing     Problem: Sepsis/Septic Shock  Goal: Optimal Coping  Outcome: Progressing  Goal: Absence of Bleeding  Outcome: Progressing  Goal: Blood Glucose Level Within Targeted Range  Outcome: Progressing  Goal: Absence of Infection Signs and Symptoms  Outcome: Progressing  Goal: Optimal Nutrition Intake  Outcome: Progressing     Problem: Infection  Goal: Absence of Infection Signs and Symptoms  Outcome: Progressing     Problem: Wound  Goal: Optimal Coping  Outcome: Progressing  Goal: Optimal Functional Ability  Outcome: Progressing  Goal: Absence of Infection Signs and Symptoms  Outcome: Progressing  Goal: Improved Oral Intake  Outcome: Progressing  Goal: Optimal Pain Control and Function  Outcome: Progressing  Goal: Skin Health and Integrity  Outcome: Progressing  Goal: Optimal Wound Healing  Outcome: Progressing     Problem: Skin Injury Risk Increased  Goal: Skin Health and Integrity  Outcome: Progressing     Problem: Hemodialysis  Goal: Safe, Effective Therapy Delivery  Outcome: Progressing  Goal: Effective Tissue Perfusion  Outcome: Progressing  Goal: Absence of Infection Signs and Symptoms  Outcome: Progressing     Problem: Pain Acute  Goal: Optimal Pain Control and  Function  Outcome: Progressing     Problem: Chronic Kidney Disease  Goal: Electrolyte Balance  Outcome: Progressing  Goal: Fluid Balance  Outcome: Progressing       Patient rested comfortably with no significant changes during the shift, remains free from falls and injuries.  Side rails up x2, bed low and locked, call light and personal belongings within reach. VS remain stable and respirations even and unlabored. No grimace, cries or other signs of distress. Questions and concerns addressed and answered. Medication compliance. Pt remains on continuous cardiac monitoring.  remains at bedside. Hygiene care performed. HOB and pillows adjusted for comfort.  Pt repositioned and continues on waffle mattress. Reviewed importance of safety barriers and calling for assistance prn.

## 2024-05-15 NOTE — SUBJECTIVE & OBJECTIVE
Scheduled Meds:   [START ON 5/15/2024] sodium chloride 0.9%   Intravenous Once    allopurinoL  50 mg Oral Every other day    apixaban  5 mg Oral BID    atorvastatin  80 mg Oral Daily    carvediloL  6.25 mg Oral BID    clopidogreL  75 mg Oral Daily    famotidine  20 mg Oral Daily    Lactobacillus rhamnosus GG  1 capsule Oral Daily    mupirocin   Nasal BID    sertraline  100 mg Oral Daily    traZODone  25 mg Oral QHS     Continuous Infusions:  PRN Meds:  Current Facility-Administered Medications:     dextrose 10%, 12.5 g, Intravenous, PRN    dextrose 10%, 25 g, Intravenous, PRN    glucagon (human recombinant), 1 mg, Intramuscular, PRN    hydrALAZINE, 25 mg, Oral, Q8H PRN    insulin aspart U-100, 0-10 Units, Subcutaneous, Q6H PRN    oxyCODONE, 5 mg, Oral, Q6H PRN    QUEtiapine, 25 mg, Oral, Daily PRN    sodium chloride 0.9%, 10 mL, Intravenous, PRN    Review of patient's allergies indicates:   Allergen Reactions    Ciprofloxacin Itching    Contrast media      Kidney injury    Iodine      Kidney injury    Pcn [penicillins]      Rash; tolerated ceftriaxone on 1/13/20        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    Osteomyelitis     Osteomyelitis of left foot 04/30/2021    Ulcer of left foot     Vaginal delivery     x1     Past Surgical History:   Procedure Laterality Date    ABOVE-KNEE AMPUTATION Left 5/18/2021    Procedure: AMPUTATION, ABOVE KNEE;  Surgeon: Teddy Huber MD;  Location: [facility] OR [address] [place] [address];  Service: Vascular;  Laterality: Left;    ABOVE-KNEE AMPUTATION Right 3/18/2022    Procedure: AMPUTATION, ABOVE KNEE;  Surgeon: DAYNE Florez II, MD;  Location: [facility] OR [address] [place] [address];  Service: Vascular;  Laterality: Right;    Angiogram - Right  Extremity Right 7/9/15    angiogram-left leg  10/6/15    ANGIOGRAPHY OF LOWER EXTREMITY Left 4/29/2021    Procedure: ANGIOGRAM, LOWER EXTREMITY;  Surgeon: Teddy Huber MD;  Location: [address];  Service: Vascular;  Laterality: Left;    BELOW KNEE AMPUTATION OF LOWER EXTREMITY Right 12/28/2021    Procedure: AMPUTATION, BELOW KNEE;  Surgeon: Kaitlyn Rojas MD;  Location: [facility] OR;  Service: General;  Laterality: Right;    CATHETERIZATION OF BOTH LEFT AND RIGHT HEART N/A 12/18/2019    Procedure: CATHETERIZATION, HEART, BOTH LEFT AND RIGHT;  Surgeon: Que Fernando III, MD;  Location: [facility] CATH LAB;  Service: Cardiology;  Laterality: N/A;    CORONARY ANGIOGRAPHY N/A 12/18/2019    Procedure: ANGIOGRAM, CORONARY ARTERY;  Surgeon: Que Fernando III, MD;  Location: [facility] CATH LAB;  Service: Cardiology;  Laterality: N/A;    CORONARY ANGIOGRAPHY INCLUDING BYPASS GRAFTS WITH CATHETERIZATION OF LEFT HEART N/A 7/28/2020    Procedure: ANGIOGRAM, CORONARY, INCLUDING BYPASS GRAFT, WITH LEFT HEART CATHETERIZATION, 9 am;  Surgeon: Rachel Easley MD;  Location: [facility] CATH LAB;  Service: Cardiology;  Laterality: N/A;    CORONARY ARTERY BYPASS GRAFT (CABG) N/A 1/14/2020    Procedure: CORONARY ARTERY BYPASS GRAFT (CABG) x 1     Off Pump;  Surgeon: Huang Altamirano MD;  Location: [address];  Service: Cardiovascular;  Laterality: N/A;    CREATION OF FEMORAL-TIBIAL ARTERY BYPASS Left 4/29/2021    Procedure: CREATION, BYPASS, ARTERIAL, FEMORAL TO ANTERIOR TIBIAL;  Surgeon: Teddy Huber MD;  Location: [address];  Service: Vascular;  Laterality: Left;    CREATION OF FEMOROPOPLITEAL ARTERIAL BYPASS USING GRAFT Left 8/18/2020    Procedure: CREATION, BYPASS, ARTERIAL, FEMORAL TO POPLITEAL, USING GRAFT, LEFT LOWER EXTREMITY;  Surgeon: Teddy Huber MD;  Location: [facility];  Service: Vascular;  Laterality: Left;  REQUEST 7:15 A.M. START----COVID NEGATIVE ON 8/17  1ST CASE STARTE PER LEANA ON  8/7/2020 @ 942AM-LO  RN PREOP 8/12/2020   T/S-----CLEARED BY CARDS-------PENDING INSURANCE    DEBRIDEMENT OF FOOT Left 9/8/2020    Procedure: DEBRIDEMENT, FOOT;  Surgeon: Rosio Mayes DPM;  Location: [facility] OR;  Service: Podiatry;  Laterality: Left;  request neoxx .   RN Pre Op 9-4-2020, Covid negative on 9/5/20. C A    DEBRIDEMENT OF FOOT  3/4/2021    Procedure: DEBRIDEMENT, FOOT;  Surgeon: Tdedy Huber MD;  Location: [facility] OR;  Service: Vascular;;    DEBRIDEMENT OF FOOT Left 3/9/2021    Procedure: DEBRIDEMENT, FOOT, bone biopsy;  Surgeon: Rosio Mayes DPM;  Location: [facility] OR;  Service: Podiatry;  Laterality: Left;  Request neoxx---COVID IN AM  REQUESTING NOON START  RN Phone Pre op.On Blood thinners Plavix and Eliquis.  Covid am of surgery. C A    DEBRIDEMENT OF FOOT Left 5/4/2021    Procedure: DEBRIDEMENT, FOOT;  Surgeon: Farooq Morley DPM;  Location: [facility] 2ND FLR;  Service: Podiatry;  Laterality: Left;    INSERTION OF TUNNELED CENTRAL VENOUS HEMODIALYSIS CATHETER N/A 1/27/2020    Procedure: Insertion, Catheter, Central Venous, Hemodialysis;  Surgeon: ESTEBAN Gomez III, MD;  Location: [facility] CATH LAB;  Service: Peripheral Vascular;  Laterality: N/A;    INSERTION OF TUNNELED CENTRAL VENOUS HEMODIALYSIS CATHETER  5/10/2023    Procedure: Insertion, Catheter, Central Venous, Hemodialysis;  Surgeon: Romulo Queen MD;  Location: [facility] CATH LAB;  Service: Cardiology;;    PERCUTANEOUS TRANSLUMINAL ANGIOPLASTY N/A 3/4/2021    Procedure: PTA (ANGIOPLASTY, PERCUTANEOUS, TRANSLUMINAL);  Surgeon: Teddy Huber MD;  Location: [facility] OR;  Service: Vascular;  Laterality: N/A;    REMOVAL OF ARTERIOVENOUS GRAFT Left 5/27/2021    Procedure: REMOVAL, GRAFT, LEFT LOWER EXTREMITY, WOUND EXPLORATION;  Surgeon: Teddy Huber MD;  Location: [facility] OR 2ND FLR;  Service: Vascular;  Laterality: Left;    REMOVAL OF NAIL OF DIGIT Left 3/9/2021    Procedure: REMOVAL, NAIL, DIGIT;  Surgeon: Rosio Mayes,  DPM;  Location: [facility] OR;  Service: Podiatry;  Laterality: Left;    RIGHT HEART CATHETERIZATION Right 5/10/2023    Procedure: INSERTION, CATHETER, RIGHT HEART;  Surgeon: Romulo Queen MD;  Location: [facility] CATH LAB;  Service: Cardiology;  Laterality: Right;    THROMBECTOMY Left 3/4/2021    Procedure: THROMBECTOMY, LEFT LOWER EXTREMITY BYPASS GRAFT, ANGIOGRAM, POSSIBLE INTERVENTION, POSSIBLE LEFT LOWER EXTREMITY BYPASS;  Surgeon: Teddy Huber MD;  Location: [facility] OR;  Service: Vascular;  Laterality: Left;    THROMBECTOMY Left 4/29/2021    Procedure: GRAFT THROMBECTOMY, LEFT LOWER EXTREMITY;  Surgeon: Teddy Huber MD;  Location: [facility] OR 2ND FLR;  Service: Vascular;  Laterality: Left;  14.5 min  1179.85 mGy  341.01 Gycm2  240 ml dye    THROMBECTOMY  10/22/2021    Procedure: THROMBECTOMY;  Surgeon: Saad Arenas MD;  Location: [facility] CATH LAB/EP;  Service: Cardiology;;       Family History       Problem Relation (Age of Onset)    Diabetes Mother, Father, Paternal Grandmother    Heart disease Maternal Grandmother    No Known Problems Maternal Grandfather, Paternal Grandfather          Tobacco Use    Smoking status: Former    Smokeless tobacco: Never   Substance and Sexual Activity    Alcohol use: No    Drug use: Yes     Types: Marijuana     Comment: occassional    Sexual activity: Yes     Partners: Male     Review of Systems   Skin:  Positive for wound.       Objective:     Vital Signs (Most Recent):  Temp: 97.6 °F (36.4 °C) (05/14/24 1928)  Pulse: 76 (05/14/24 1928)  Resp: 18 (05/14/24 1928)  BP: 135/66 (05/14/24 1928)  SpO2: (!) 91 % (05/14/24 1928) Vital Signs (24h Range):  Temp:  [97.1 °F (36.2 °C)-98.2 °F (36.8 °C)] 97.6 °F (36.4 °C)  Pulse:  [70-78] 76  Resp:  [16-18] 18  SpO2:  [91 %-97 %] 91 %  BP: (103-135)/(57-69) 135/66     Weight: 68.1 kg (150 lb 2.1 oz)  Body mass index is 45.81 kg/m².     Physical Exam  Constitutional:       Appearance: Normal appearance.   Skin:     General: Skin is warm  and dry.      Findings: Lesion present.   Neurological:      Mental Status: She is alert.          Laboratory:  All pertinent labs reviewed within the last 24 hours.    Diagnostic Results:  None

## 2024-05-15 NOTE — PLAN OF CARE
Problem: Chronic Kidney Disease  Goal: Electrolyte Balance  Outcome: Progressing  Goal: Fluid Balance  Outcome: Progressing

## 2024-05-15 NOTE — PLAN OF CARE
Problem: Fall Injury Risk  Goal: Absence of Fall and Fall-Related Injury  Outcome: Progressing     Problem: Adult Inpatient Plan of Care  Goal: Plan of Care Review  Outcome: Progressing  Goal: Absence of Hospital-Acquired Illness or Injury  Outcome: Progressing  Goal: Optimal Comfort and Wellbeing  Outcome: Progressing     Problem: Diabetes Comorbidity  Goal: Blood Glucose Level Within Targeted Range  Outcome: Progressing     Problem: Sepsis/Septic Shock  Goal: Optimal Coping  Outcome: Progressing     Problem: Wound  Goal: Optimal Coping  Outcome: Progressing     Problem: Skin Injury Risk Increased  Goal: Skin Health and Integrity  Outcome: Progressing     Problem: Hemodialysis  Goal: Safe, Effective Therapy Delivery  Outcome: Progressing    POC reviewed with pt who verbalized understanding. AAOx1-2. VSS on RA. No complaints of pain or nausea. Turned with assist. BG monitored with no coverage needed per MAR. Resting between care. Remains free of falls and injury. Avasys in use, bed alarm on, call light in reach, and rounds made for safety.  at bedside.

## 2024-05-15 NOTE — PROGRESS NOTES
05/15/24 1145        Hemodialysis Catheter 05/25/23 1336 left internal jugular   Placement Date/Time: 05/25/23 1336   Present Prior to Hospital Arrival?: No  Hand Hygiene: Performed  Barrier Precautions: Performed  Skin Antisepsis: ChloraPrep  Hemodialysis Catheter Type: Tunneled catheter  Location: left internal jugular  Catheter...   Site Assessment No drainage   Line Securement Device Secured with sutureless device   Dressing Type CHG impregnated dressing/sponge   Dressing Status Clean;Dry;Intact   Venous Patency/Care deaccessed;flushed w/o difficulty;normal saline locked   Arterial Patency/Care deaccessed;flushed w/o difficulty;normal saline locked   During Hemodialysis Assessment   Blood Flow Rate (mL/min) 400 mL/min   Dialysate Flow Rate (mL/min) 700 ml/min   Ultrafiltration Rate (mL/Hr) 760 mL/Hr   Arteriovenous Lines Secure Yes   Arterial Pressure (mmHg) -140 mmHg   Venous Pressure (mmHg) 150   UF Removed (mL) 1917 mL   TMP 40   Venous Line in Air Detector Yes   Intra-Hemodialysis Comments Patient trying to get out  off bed/ HD ended. Patient agitated, NP notified   Post-Hemodialysis Assessment   Rinseback Volume (mL) 250 mL   Blood Volume Processed (Liters) 56.5 L   Dialyzer Clearance Lightly streaked   Duration of Treatment 150 minutes   Total UF (mL) 1917 mL   Net Fluid Removal 1417   Patient Response to Treatment agitated in last HR of TX.     HD ended early, patient trying to get out of bed, agitated, NP notified.

## 2024-05-15 NOTE — PLAN OF CARE
APPOINTMENT:    Patient Appointment(s) scheduled with  Haydee Moore  Wednesday May 29, 2024 11:00 AM

## 2024-05-15 NOTE — PROGRESS NOTES
05/15/24 0910 05/15/24 0913        Hemodialysis Catheter 05/25/23 1336 left internal jugular   Placement Date/Time: 05/25/23 1336   Present Prior to Hospital Arrival?: No  Hand Hygiene: Performed  Barrier Precautions: Performed  Skin Antisepsis: ChloraPrep  Hemodialysis Catheter Type: Tunneled catheter  Location: left internal jugular  Catheter...   Site Assessment No drainage  --    Line Securement Device Secured with sutureless device  --    Dressing Type CHG impregnated dressing/sponge  --    Dressing Status Clean;Dry;Intact  --    Venous Patency/Care accessed;blood return present;flushed w/o difficulty  --    Arterial Patency/Care accessed;blood return present;flushed w/o difficulty  --    During Hemodialysis Assessment   Blood Flow Rate (mL/min)  --  400 mL/min   Dialysate Flow Rate (mL/min)  --  700 ml/min   Ultrafiltration Rate (mL/Hr)  --  760 mL/Hr   Arteriovenous Lines Secure  --  Yes   Arterial Pressure (mmHg)  --  -140 mmHg   Venous Pressure (mmHg)  --  150   Blood Volume Processed (Liters)  --  0 L   UF Removed (mL)  --  0 mL   TMP  --  40   Venous Line in Air Detector  --  Yes   Intake (mL)  --  250 mL   Intra-Hemodialysis Comments  --  HD started     Patient arrived [facility] by stretcher. Maintenance HD started via left subclavian CVC.

## 2024-05-16 LAB
POCT GLUCOSE: 109 MG/DL (ref 70–110)
POCT GLUCOSE: 130 MG/DL (ref 70–110)
POCT GLUCOSE: 184 MG/DL (ref 70–110)
POCT GLUCOSE: 204 MG/DL (ref 70–110)
TB INDURATION 48 - 72 HR READ: NORMAL
TB SKIN TEST 48 - 72 HR READ: NORMAL

## 2024-05-16 PROCEDURE — 97530 THERAPEUTIC ACTIVITIES: CPT | Mod: HCNC

## 2024-05-16 PROCEDURE — 21400001 HC TELEMETRY ROOM: Mod: HCNC

## 2024-05-16 PROCEDURE — 99232 SBSQ HOSP IP/OBS MODERATE 35: CPT | Mod: HCNC,,, | Performed by: NURSE PRACTITIONER

## 2024-05-16 PROCEDURE — 25000003 PHARM REV CODE 250: Mod: HCNC | Performed by: STUDENT IN AN ORGANIZED HEALTH CARE EDUCATION/TRAINING PROGRAM

## 2024-05-16 PROCEDURE — 25000003 PHARM REV CODE 250: Mod: HCNC | Performed by: HOSPITALIST

## 2024-05-16 PROCEDURE — 97535 SELF CARE MNGMENT TRAINING: CPT | Mod: HCNC

## 2024-05-16 RX ADMIN — CARVEDILOL 6.25 MG: 6.25 TABLET, FILM COATED ORAL at 08:05

## 2024-05-16 RX ADMIN — APIXABAN 5 MG: 5 TABLET, FILM COATED ORAL at 09:05

## 2024-05-16 RX ADMIN — FAMOTIDINE 20 MG: 20 TABLET ORAL at 09:05

## 2024-05-16 RX ADMIN — SERTRALINE HYDROCHLORIDE 100 MG: 50 TABLET ORAL at 09:05

## 2024-05-16 RX ADMIN — Medication 1 CAPSULE: at 09:05

## 2024-05-16 RX ADMIN — INSULIN ASPART 2 UNITS: 100 INJECTION, SOLUTION INTRAVENOUS; SUBCUTANEOUS at 12:05

## 2024-05-16 RX ADMIN — ALLOPURINOL 50 MG: 300 TABLET ORAL at 09:05

## 2024-05-16 RX ADMIN — INSULIN ASPART 4 UNITS: 100 INJECTION, SOLUTION INTRAVENOUS; SUBCUTANEOUS at 09:05

## 2024-05-16 RX ADMIN — ATORVASTATIN CALCIUM 80 MG: 40 TABLET, FILM COATED ORAL at 09:05

## 2024-05-16 RX ADMIN — MUPIROCIN: 20 OINTMENT TOPICAL at 09:05

## 2024-05-16 RX ADMIN — MUPIROCIN: 20 OINTMENT TOPICAL at 08:05

## 2024-05-16 RX ADMIN — APIXABAN 5 MG: 5 TABLET, FILM COATED ORAL at 08:05

## 2024-05-16 RX ADMIN — TRAZODONE HYDROCHLORIDE 25 MG: 50 TABLET ORAL at 08:05

## 2024-05-16 RX ADMIN — CLOPIDOGREL BISULFATE 75 MG: 75 TABLET ORAL at 09:05

## 2024-05-16 RX ADMIN — CARVEDILOL 6.25 MG: 6.25 TABLET, FILM COATED ORAL at 09:05

## 2024-05-16 NOTE — SUBJECTIVE & OBJECTIVE
Interval History: HD yesterday, tolerated well. Net UF 1.4L.     Review of patient's allergies indicates:   Allergen Reactions    Ciprofloxacin Itching    Contrast media      Kidney injury    Iodine      Kidney injury    Pcn [penicillins]      Rash; tolerated ceftriaxone on 1/13/20     Current Facility-Administered Medications   Medication Frequency    allopurinol split tablet 50 mg Every other day    apixaban tablet 5 mg BID    atorvastatin tablet 80 mg Daily    carvediloL tablet 6.25 mg BID    clopidogreL tablet 75 mg Daily    dextrose 10% bolus 125 mL 125 mL PRN    dextrose 10% bolus 250 mL 250 mL PRN    famotidine tablet 20 mg Daily    glucagon (human recombinant) injection 1 mg PRN    hydrALAZINE tablet 25 mg Q8H PRN    insulin aspart U-100 pen 0-10 Units Q6H PRN    Lactobacillus rhamnosus GG capsule 1 capsule Daily    mupirocin 2 % ointment BID    oxyCODONE immediate release tablet 5 mg Q6H PRN    QUEtiapine tablet 25 mg Daily PRN    sertraline tablet 100 mg Daily    sodium chloride 0.9% flush 10 mL PRN    trazodone split tablet 25 mg QHS       Objective:     Vital Signs (Most Recent):  Temp: 98.5 °F (36.9 °C) (05/16/24 1105)  Pulse: 76 (05/16/24 1105)  Resp: 18 (05/16/24 1105)  BP: (!) 119/56 (05/16/24 1105)  SpO2: (!) 94 % (05/16/24 1105) Vital Signs (24h Range):  Temp:  [97.6 °F (36.4 °C)-98.7 °F (37.1 °C)] 98.5 °F (36.9 °C)  Pulse:  [] 76  Resp:  [16-18] 18  SpO2:  [94 %-98 %] 94 %  BP: (119-154)/(56-80) 119/56     Weight: 69.1 kg (152 lb 5.4 oz) (05/16/24 0514)  Body mass index is 46.49 kg/m².  Body surface area is 1.53 meters squared.    I/O last 3 completed shifts:  In: 494.5 [P.O.:240; I.V.:254.5]  Out: 1917 [Other:1917]     Physical Exam  Vitals and nursing note reviewed.   Constitutional:       Appearance: Normal appearance.   Skin:     General: Skin is warm and dry.      Findings: Lesion present.   Neurological:      Mental Status: She is alert.          Significant Labs:  CBC:   Recent Labs    Lab 05/14/24  0454   WBC 5.11   RBC 3.61*   HGB 11.0*   HCT 35.1*   *   MCV 97   MCH 30.5   MCHC 31.3*     CMP:   Recent Labs   Lab 05/10/24  0509 05/11/24  0508 05/15/24  0306   *   < > 145*   CALCIUM 9.7   < > 8.7   ALBUMIN 2.5*  --  2.3*   PROT 8.5*  --   --       < > 133*   K 3.9   < > 4.8   CO2 23   < > 20*      < > 99   BUN 27*   < > 39*   CREATININE 3.9*   < > 5.5*   ALKPHOS 170*  --   --    ALT 9*  --   --    AST 26  --   --    BILITOT 0.7  --   --     < > = values in this interval not displayed.     All labs within the past 24 hours have been reviewed.

## 2024-05-16 NOTE — ASSESSMENT & PLAN NOTE
ESRD on iHD MWF  Dr. Jesika MATA TDC  + residual renal fx    Missed HD session on Monday prior to presentation.    Plan/Recommendations:    -Continue MWD iHD schedule, next HD 5/17  -strict I/O's  -Renal diet when advanced  -phos WNL, will monitor for now.  No need for phos binders at this time  -Hgb @ goal for ESRD, continue trending.  No need for NAEEM therapy.

## 2024-05-16 NOTE — PLAN OF CARE
Called and spoke with Teresita at [facility] and was informed that she had not received a decision from [facility] regarding SNF auth. Teresita states that she will give me a call once she hears something.       CM spoke with patient 's spouse about possible placement if SNF is denied.  became tearful and states that he would think about it but is very hopeful that snf will be approved, giving him more time before having to make the decision to place his wife.    Called and spoke with Preethi () at [facility] [phone] and informed her that patient has not yet discharged and will not make her 12:15pm HD appointment today. Preethi asked to be kept updated on patient's discharge so that she can change patient's chair time to accommodate her discharge.    1200- Spoke with Daysi at [facility] and was informed that [facility] is requesting a peer to peer before 1:00pm. Reached out to MD via secure chat.    3773- Spoke with patient's spouse again regarding placement and spouse is agreeable to correction placement at [facility].          Palma Do, DEANGELOCM  Case Management  [phone]

## 2024-05-16 NOTE — ASSESSMENT & PLAN NOTE
Med rec completed per patient's family at bedside, with  Susan ([id number]).    Allergies reviewed with family. NKDA.    Family states that patient is not on any prescription medications at home.    Outpatient antibiotics within the last 30 days: NONE.    ANTICOAGULATION: NONE.    Preferred pharmacy for discharge medications: [facility] on [address].   Patient requires revascularization of the left lower extremity. Angiogram completed 7/24 with bilateral occlusive disease. Vascular surgery following and will need revascularization

## 2024-05-16 NOTE — PROGRESS NOTES
[facility] Surg  Nephrology  Progress Note    Patient Name: Suyapa Connelly  MRN: [id number]  Admission Date: 5/7/2024  Hospital Length of Stay: 9 days  Attending Provider: Olga Washington MD   Primary Care Physician: Magen Christensen MD  Principal Problem:Severe sepsis    Subjective:     Interval History: HD yesterday, tolerated well. Net UF 1.4L.     Review of patient's allergies indicates:   Allergen Reactions    Ciprofloxacin Itching    Contrast media      Kidney injury    Iodine      Kidney injury    Pcn [penicillins]      Rash; tolerated ceftriaxone on 1/13/20     Current Facility-Administered Medications   Medication Frequency    allopurinol split tablet 50 mg Every other day    apixaban tablet 5 mg BID    atorvastatin tablet 80 mg Daily    carvediloL tablet 6.25 mg BID    clopidogreL tablet 75 mg Daily    dextrose 10% bolus 125 mL 125 mL PRN    dextrose 10% bolus 250 mL 250 mL PRN    famotidine tablet 20 mg Daily    glucagon (human recombinant) injection 1 mg PRN    hydrALAZINE tablet 25 mg Q8H PRN    insulin aspart U-100 pen 0-10 Units Q6H PRN    Lactobacillus rhamnosus GG capsule 1 capsule Daily    mupirocin 2 % ointment BID    oxyCODONE immediate release tablet 5 mg Q6H PRN    QUEtiapine tablet 25 mg Daily PRN    sertraline tablet 100 mg Daily    sodium chloride 0.9% flush 10 mL PRN    trazodone split tablet 25 mg QHS       Objective:     Vital Signs (Most Recent):  Temp: 98.5 °F (36.9 °C) (05/16/24 1105)  Pulse: 76 (05/16/24 1105)  Resp: 18 (05/16/24 1105)  BP: (!) 119/56 (05/16/24 1105)  SpO2: (!) 94 % (05/16/24 1105) Vital Signs (24h Range):  Temp:  [97.6 °F (36.4 °C)-98.7 °F (37.1 °C)] 98.5 °F (36.9 °C)  Pulse:  [] 76  Resp:  [16-18] 18  SpO2:  [94 %-98 %] 94 %  BP: (119-154)/(56-80) 119/56     Weight: 69.1 kg (152 lb 5.4 oz) (05/16/24 0514)  Body mass index is 46.49 kg/m².  Body surface area is 1.53 meters squared.    I/O last 3 completed shifts:  In: 494.5 [P.O.:240; I.V.:254.5]  Out: 1917  [Other:1917]     Physical Exam  Vitals and nursing note reviewed.   Constitutional:       Appearance: Normal appearance.   Skin:     General: Skin is warm and dry.      Findings: Lesion present.   Neurological:      Mental Status: She is alert.          Significant Labs:  CBC:   Recent Labs   Lab 05/14/24  0454   WBC 5.11   RBC 3.61*   HGB 11.0*   HCT 35.1*   *   MCV 97   MCH 30.5   MCHC 31.3*     CMP:   Recent Labs   Lab 05/10/24  0509 05/11/24  0508 05/15/24  0306   *   < > 145*   CALCIUM 9.7   < > 8.7   ALBUMIN 2.5*  --  2.3*   PROT 8.5*  --   --       < > 133*   K 3.9   < > 4.8   CO2 23   < > 20*      < > 99   BUN 27*   < > 39*   CREATININE 3.9*   < > 5.5*   ALKPHOS 170*  --   --    ALT 9*  --   --    AST 26  --   --    BILITOT 0.7  --   --     < > = values in this interval not displayed.     All labs within the past 24 hours have been reviewed.         Assessment/Plan:     Renal/  ESRD (end stage renal disease)  ESRD on iHD MWF  Dr. Jesika MATA TDC  + residual renal fx    Missed HD session on Monday prior to presentation.    Plan/Recommendations:    -Continue MWD iHD schedule, next HD 5/17  -strict I/O's  -Renal diet when advanced  -phos WNL, will monitor for now.  No need for phos binders at this time  -Hgb @ goal for ESRD, continue trending.  No need for NAEEM therapy.    ID  * Severe sepsis  -management primary    Oncology  Anemia due to chronic kidney disease, on chronic dialysis  @ goal  -continue trending        Thank you for your consult. I will follow-up with patient. Please contact us if you have any additional questions.    Katie Monique, CHRIS  Nephrology  [facility] - Med Surg

## 2024-05-16 NOTE — PLAN OF CARE
Problem: Fall Injury Risk  Goal: Absence of Fall and Fall-Related Injury  Outcome: Progressing     Problem: Restraint, Nonviolent  Goal: Absence of Harm or Injury  Outcome: Progressing     Problem: Adult Inpatient Plan of Care  Goal: Plan of Care Review  Outcome: Progressing  Goal: Patient-Specific Goal (Individualized)  Outcome: Progressing  Goal: Absence of Hospital-Acquired Illness or Injury  Outcome: Progressing  Goal: Optimal Comfort and Wellbeing  Outcome: Progressing  Goal: Readiness for Transition of Care  Outcome: Progressing     Problem: Bariatric Environmental Safety  Goal: Safety Maintained with Care  Outcome: Progressing     Problem: Diabetes Comorbidity  Goal: Blood Glucose Level Within Targeted Range  Outcome: Progressing     Problem: Sepsis/Septic Shock  Goal: Optimal Coping  Outcome: Progressing  Goal: Absence of Bleeding  Outcome: Progressing  Goal: Blood Glucose Level Within Targeted Range  Outcome: Progressing  Goal: Absence of Infection Signs and Symptoms  Outcome: Progressing  Goal: Optimal Nutrition Intake  Outcome: Progressing     Problem: Infection  Goal: Absence of Infection Signs and Symptoms  Outcome: Progressing     Problem: Wound  Goal: Optimal Coping  Outcome: Progressing  Goal: Optimal Functional Ability  Outcome: Progressing  Goal: Absence of Infection Signs and Symptoms  Outcome: Progressing  Goal: Improved Oral Intake  Outcome: Progressing  Goal: Optimal Pain Control and Function  Outcome: Progressing  Goal: Skin Health and Integrity  Outcome: Progressing  Goal: Optimal Wound Healing  Outcome: Progressing     Problem: Skin Injury Risk Increased  Goal: Skin Health and Integrity  Outcome: Progressing     Problem: Hemodialysis  Goal: Safe, Effective Therapy Delivery  Outcome: Progressing  Goal: Effective Tissue Perfusion  Outcome: Progressing  Goal: Absence of Infection Signs and Symptoms  Outcome: Progressing     Problem: Pain Acute  Goal: Optimal Pain Control and  Function  Outcome: Progressing     Problem: Chronic Kidney Disease  Goal: Electrolyte Balance  Outcome: Progressing  Goal: Fluid Balance  Outcome: Progressing     Patient rested comfortably with no significant changes during the shift, remains free from falls and injuries.  Side rails up x2, bed low and locked, call light and personal belongings within reach. VS remain stable and respirations even and unlabored. No grimace, cries or other signs of distress. Questions and concerns addressed and answered. Medication compliance. Pt remains on continuous cardiac monitoring.  remains at bedside. Hygiene and wound care performed. HOB and pillows adjusted for comfort.  Pt repositioned and continues on waffle mattress. Reviewed importance of safety barriers and calling for assistance prn.

## 2024-05-16 NOTE — PLAN OF CARE
Problem: Physical Therapy  Goal: Physical Therapy Goal  Description: Goals to be met by: 2024    Patient will increase functional independence with mobility by performin. Supine to sit with Stand-by Assistance  2. Sit to supine with Stand-by Assistance  3. Rolling to Left and Right with Stand-by Assistance  4. Lateral Scooting to Left and Right with Stand-by Assistance  5. Pt to be able to sit on the EOB for 10 min with no assist while performing activities with her UEs. -not met    Outcome: Progressing   Pt's goals remain appropriate and pt will continue to benefit from skilled PT services to work towards improved functional mobility including: bed mobility, transfers, and w/c mobility. Jenna Gerardo PT  2024

## 2024-05-16 NOTE — PLAN OF CARE
Problem: Fall Injury Risk  Goal: Absence of Fall and Fall-Related Injury  Outcome: Progressing     Problem: Restraint, Nonviolent  Goal: Absence of Harm or Injury  Outcome: Progressing     Problem: Adult Inpatient Plan of Care  Goal: Plan of Care Review  Outcome: Progressing  Goal: Patient-Specific Goal (Individualized)  Outcome: Progressing  Goal: Absence of Hospital-Acquired Illness or Injury  Outcome: Progressing  Goal: Optimal Comfort and Wellbeing  Outcome: Progressing  Goal: Readiness for Transition of Care  Outcome: Progressing     Problem: Bariatric Environmental Safety  Goal: Safety Maintained with Care  Outcome: Progressing     Problem: Diabetes Comorbidity  Goal: Blood Glucose Level Within Targeted Range  Outcome: Progressing     Problem: Sepsis/Septic Shock  Goal: Optimal Coping  Outcome: Progressing  Goal: Absence of Bleeding  Outcome: Progressing  Goal: Blood Glucose Level Within Targeted Range  Outcome: Progressing  Goal: Absence of Infection Signs and Symptoms  Outcome: Progressing  Goal: Optimal Nutrition Intake  Outcome: Progressing     Problem: Infection  Goal: Absence of Infection Signs and Symptoms  Outcome: Progressing     Problem: Wound  Goal: Optimal Coping  Outcome: Progressing  Goal: Optimal Functional Ability  Outcome: Progressing  Goal: Absence of Infection Signs and Symptoms  Outcome: Progressing  Goal: Improved Oral Intake  Outcome: Progressing  Goal: Optimal Pain Control and Function  Outcome: Progressing  Goal: Skin Health and Integrity  Outcome: Progressing  Goal: Optimal Wound Healing  Outcome: Progressing     Problem: Skin Injury Risk Increased  Goal: Skin Health and Integrity  Outcome: Progressing     Problem: Hemodialysis  Goal: Safe, Effective Therapy Delivery  Outcome: Progressing  Goal: Effective Tissue Perfusion  Outcome: Progressing  Goal: Absence of Infection Signs and Symptoms  Outcome: Progressing     Problem: Pain Acute  Goal: Optimal Pain Control and  Function  Outcome: Progressing     Problem: Chronic Kidney Disease  Goal: Electrolyte Balance  Outcome: Progressing  Goal: Fluid Balance  Outcome: Progressing

## 2024-05-16 NOTE — PT/OT/SLP PROGRESS
Occupational Therapy   Co-Treatment    Name: Suyapa Connelly  MRN: [id number]  Admitting Diagnosis:  Severe sepsis       Recommendations:     Discharge Recommendations: Moderate Intensity Therapy  Discharge Equipment Recommendations:  bedside commode  Barriers to discharge:  None    Assessment:     Suyapa Connelly is a 58 y.o. female with a medical diagnosis of Severe sepsis. Pt.was crying and tearful throughout the entire session, however she was unable to express her reason for crying possibly 2/2 to pain or being overwhelmed with th e w/c tf today.  Performance deficits affecting function are weakness, impaired endurance, impaired self care skills, impaired functional mobility, gait instability, impaired balance, decreased lower extremity function, decreased safety awareness, pain, impaired skin.     Rehab Prognosis:  Good; patient would benefit from acute skilled OT services to address these deficits and reach maximum level of function.       Plan:     Patient to be seen 2 x/week to address the above listed problems via self-care/home management, therapeutic activities, therapeutic exercises, neuromuscular re-education  Plan of Care Expires: 6/06/24  Plan of Care Reviewed with: patient    Subjective     Chief Complaint: Tearful throughout session   Patient/Family Comments/goals: Pt.'s spouse expressed she has not been sleeping much   Pain/Comfort:  Pain Rating 1:  (pt tearful and crying but pt unable to state whether it was due to pain)  Pain Rating Post-Intervention 1:  (pt crying but unable to state whether it was from pain)    Objective:     Communicated with: Nurse prior to session.  Patient found HOB elevated with telemetry upon OT entry to room.    General Precautions: Standard, aspiration, fall    Orthopedic Precautions:N/A  Braces: N/A  Respiratory Status: Room air     Occupational Performance:     Bed Mobility:    Patient completed Supine to Sit with total assistance  Patient completed Sit to Supine  with total assistance     Functional Mobility/Transfers:  Patient completed Bed <> wheelchair Transfer using Slide Board technique with total assistance and of 2 persons with no assistive device    Activities of Daily Living:  Grooming: moderate assistance facial hygiene, apply lotion to UE and face seated upright in w/c  Attempted to completed oral care pt. Did not initiate movement/ gesture to complete the task  Decrease in verbal response noted in the session     AMPA 6 Click ADL: 13    Treatment & Education:  Educated on the importance of grooming and hygiene, postioning for skin integrity and maintaining strength in UE to engage in ADLs   Pt educated on role of occupational therapy, POC, and safety during ADLs and functional mobility. Pt and OT discussed importance of safe, continued mobility to optimize daily living skills. Pt verbalized understanding. Pt given instruction to call for medical staff/nurse for assistance.   Co-treatment with PT for maximal pt participation, safety, and activity tolerance       Patient left HOB elevated with all lines intact and call button in reach    GOALS:   Multidisciplinary Problems       Occupational Therapy Goals          Problem: Occupational Therapy    Goal Priority Disciplines Outcome Interventions   Occupational Therapy Goal     OT, PT/OT Progressing    Description: Goals to be met by: 6/6/24     Patient will increase functional independence with ADLs by performing:    Grooming while seated with Contact Guard Assistance.  Sitting at edge of bed 10 minutes with Supervision.  Rolling to Bilateral with [place].   Supine to sit with [place].                         Time Tracking:     OT Date of Treatment: 05/16/24  OT Start Time: 1007  OT Stop Time: 1033  OT Total Time (min): 26 min    Billable Minutes:Self Care/Home Management 10  Therapeutic Activity 16    OT/MACKENZIE: OT          5/16/2024

## 2024-05-16 NOTE — PT/OT/SLP PROGRESS
Physical Therapy Treatment    Patient Name:  Suyapa Connelly   MRN:  [id number]    Recommendations:     Discharge Recommendations: Moderate Intensity Therapy  Discharge Equipment Recommendations: bedside commode (drop arm commode)  Barriers to discharge: None    Assessment:     Suyapa Connelly is a 58 y.o. female admitted with a medical diagnosis of Severe sepsis.  She presents with the following impairments/functional limitations: weakness, impaired functional mobility, impaired endurance, impaired balance, impaired self care skills, decreased lower extremity function, decreased upper extremity function . Pt is unsafe with functional mobility at this time due to pt requires total assist for bed mobility, total assist of 2 for transfers, and max assist for sitting balance due to posterior and L sided LOB. Pt is motivated to progress with functional mobility.     Rehab Prognosis: Fair; patient would benefit from acute skilled PT services to address these deficits and reach maximum level of function.    Recent Surgery: * No surgery found *      Plan:     During this hospitalization, patient to be seen 3 x/week to address the identified rehab impairments via therapeutic activities, therapeutic exercises, neuromuscular re-education, wheelchair management/training and progress toward the following goals:    Plan of Care Expires:  06/08/24    Subjective   Pt crying but pt unable to state why  Pain/Comfort:  Pain Rating 1:  (pt crying but pt unable to state whether it was due to pain)  Pain Rating Post-Intervention 1:  (pt crying but unable to state whether it was from pain)      Objective:     Communicated with nurse prior to session.  Patient found HOB elevated with telemetry upon PT entry to room.     General Precautions: Standard, aspiration, fall  Orthopedic Precautions: N/A (pt has B AKA)  Braces: N/A  Respiratory Status: Room air     Functional Mobility:  Bed Mobility:     Supine to Sit: total assistance  Sit to  Supine: total assistance  Transfers:     Bed to Chair: total assistance and of 2 persons with  slide board  using  Slide Board pt with posterior LOB and required max assist to remain upright in sitting during transfer    AM-PAC 6 CLICK MOBILITY  Turning over in bed (including adjusting bedclothes, sheets and blankets)?: 2  Sitting down on and standing up from a chair with arms (e.g., wheelchair, bedside commode, etc.): 1  Moving from lying on back to sitting on the side of the bed?: 2  Moving to and from a bed to a chair (including a wheelchair)?: 2  Need to walk in hospital room?: 1  Climbing 3-5 steps with a railing?: 1  Basic Mobility Total Score: 9     Treatment & Education:  Pt performed ADLs in sitting in the w/c prior to transfer back to bed.     Patient left HOB elevated with all lines intact, call button in reach, and nurse notified..    GOALS:   Multidisciplinary Problems       Physical Therapy Goals          Problem: Physical Therapy    Goal Priority Disciplines Outcome Goal Variances Interventions   Physical Therapy Goal     PT, PT/OT Progressing     Description: Goals to be met by: 2024    Patient will increase functional independence with mobility by performin. Supine to sit with Stand-by Assistance  2. Sit to supine with Stand-by Assistance  3. Rolling to Left and Right with Stand-by Assistance  4. Lateral Scooting to Left and Right with Stand-by Assistance  5. Pt to be able to sit on the EOB for 10 min with no assist while performing activities with her UEs. -not met                         Time Tracking:     PT Received On: 24  PT Start Time: 1007     PT Stop Time: 1033  PT Total Time (min): 26 min     Billable Minutes: Therapeutic Activity 26    Treatment Type: Treatment  PT/PTA: PT     Number of PTA visits since last PT visit: 0     2024

## 2024-05-17 LAB
ALBUMIN SERPL BCP-MCNC: 2.4 G/DL (ref 3.5–5.2)
ANION GAP SERPL CALC-SCNC: 13 MMOL/L (ref 8–16)
BASOPHILS # BLD AUTO: 0.06 K/UL (ref 0–0.2)
BASOPHILS NFR BLD: 1.3 % (ref 0–1.9)
BUN SERPL-MCNC: 38 MG/DL (ref 6–20)
CALCIUM SERPL-MCNC: 8.9 MG/DL (ref 8.7–10.5)
CHLORIDE SERPL-SCNC: 100 MMOL/L (ref 95–110)
CO2 SERPL-SCNC: 19 MMOL/L (ref 23–29)
CREAT SERPL-MCNC: 5 MG/DL (ref 0.5–1.4)
DIFFERENTIAL METHOD BLD: ABNORMAL
EOSINOPHIL # BLD AUTO: 0.2 K/UL (ref 0–0.5)
EOSINOPHIL NFR BLD: 3.6 % (ref 0–8)
ERYTHROCYTE [DISTWIDTH] IN BLOOD BY AUTOMATED COUNT: 16.5 % (ref 11.5–14.5)
EST. GFR  (NO RACE VARIABLE): 9.5 ML/MIN/1.73 M^2
GLUCOSE SERPL-MCNC: 126 MG/DL (ref 70–110)
HCT VFR BLD AUTO: 37 % (ref 37–48.5)
HGB BLD-MCNC: 11.8 G/DL (ref 12–16)
IMM GRANULOCYTES # BLD AUTO: 0.02 K/UL (ref 0–0.04)
IMM GRANULOCYTES NFR BLD AUTO: 0.4 % (ref 0–0.5)
LYMPHOCYTES # BLD AUTO: 1 K/UL (ref 1–4.8)
LYMPHOCYTES NFR BLD: 22.4 % (ref 18–48)
MCH RBC QN AUTO: 31.2 PG (ref 27–31)
MCHC RBC AUTO-ENTMCNC: 31.9 G/DL (ref 32–36)
MCV RBC AUTO: 98 FL (ref 82–98)
MONOCYTES # BLD AUTO: 0.5 K/UL (ref 0.3–1)
MONOCYTES NFR BLD: 12.1 % (ref 4–15)
NEUTROPHILS # BLD AUTO: 2.7 K/UL (ref 1.8–7.7)
NEUTROPHILS NFR BLD: 60.2 % (ref 38–73)
NRBC BLD-RTO: 0 /100 WBC
PHOSPHATE SERPL-MCNC: 4.6 MG/DL (ref 2.7–4.5)
PLATELET # BLD AUTO: 196 K/UL (ref 150–450)
PMV BLD AUTO: 11.7 FL (ref 9.2–12.9)
POCT GLUCOSE: 122 MG/DL (ref 70–110)
POCT GLUCOSE: 76 MG/DL (ref 70–110)
POCT GLUCOSE: 98 MG/DL (ref 70–110)
POTASSIUM SERPL-SCNC: 4.4 MMOL/L (ref 3.5–5.1)
RBC # BLD AUTO: 3.78 M/UL (ref 4–5.4)
SODIUM SERPL-SCNC: 132 MMOL/L (ref 136–145)
WBC # BLD AUTO: 4.47 K/UL (ref 3.9–12.7)

## 2024-05-17 PROCEDURE — 25000003 PHARM REV CODE 250: Mod: HCNC | Performed by: HOSPITALIST

## 2024-05-17 PROCEDURE — 21400001 HC TELEMETRY ROOM: Mod: HCNC

## 2024-05-17 PROCEDURE — 25000003 PHARM REV CODE 250: Mod: HCNC | Performed by: NURSE PRACTITIONER

## 2024-05-17 PROCEDURE — 80069 RENAL FUNCTION PANEL: CPT | Mod: HCNC | Performed by: INTERNAL MEDICINE

## 2024-05-17 PROCEDURE — 25000003 PHARM REV CODE 250: Mod: HCNC | Performed by: STUDENT IN AN ORGANIZED HEALTH CARE EDUCATION/TRAINING PROGRAM

## 2024-05-17 PROCEDURE — 36415 COLL VENOUS BLD VENIPUNCTURE: CPT | Mod: HCNC | Performed by: INTERNAL MEDICINE

## 2024-05-17 PROCEDURE — 80100016 HC MAINTENANCE HEMODIALYSIS: Mod: HCNC

## 2024-05-17 PROCEDURE — 90935 HEMODIALYSIS ONE EVALUATION: CPT | Mod: HCNC,,, | Performed by: INTERNAL MEDICINE

## 2024-05-17 PROCEDURE — 85025 COMPLETE CBC W/AUTO DIFF WBC: CPT | Mod: HCNC | Performed by: INTERNAL MEDICINE

## 2024-05-17 RX ORDER — SODIUM CHLORIDE 9 MG/ML
INJECTION, SOLUTION INTRAVENOUS ONCE
Status: COMPLETED | OUTPATIENT
Start: 2024-05-17 | End: 2024-05-17

## 2024-05-17 RX ADMIN — MUPIROCIN: 20 OINTMENT TOPICAL at 09:05

## 2024-05-17 RX ADMIN — CARVEDILOL 6.25 MG: 6.25 TABLET, FILM COATED ORAL at 09:05

## 2024-05-17 RX ADMIN — ATORVASTATIN CALCIUM 80 MG: 40 TABLET, FILM COATED ORAL at 09:05

## 2024-05-17 RX ADMIN — FAMOTIDINE 20 MG: 20 TABLET ORAL at 09:05

## 2024-05-17 RX ADMIN — SODIUM CHLORIDE: 9 INJECTION, SOLUTION INTRAVENOUS at 09:05

## 2024-05-17 RX ADMIN — APIXABAN 5 MG: 5 TABLET, FILM COATED ORAL at 09:05

## 2024-05-17 RX ADMIN — Medication 1 CAPSULE: at 09:05

## 2024-05-17 RX ADMIN — TRAZODONE HYDROCHLORIDE 25 MG: 50 TABLET ORAL at 09:05

## 2024-05-17 RX ADMIN — SERTRALINE HYDROCHLORIDE 100 MG: 50 TABLET ORAL at 09:05

## 2024-05-17 RX ADMIN — OXYCODONE 5 MG: 5 TABLET ORAL at 11:05

## 2024-05-17 RX ADMIN — QUETIAPINE FUMARATE 25 MG: 25 TABLET ORAL at 10:05

## 2024-05-17 RX ADMIN — CLOPIDOGREL BISULFATE 75 MG: 75 TABLET ORAL at 09:05

## 2024-05-17 NOTE — PROGRESS NOTES
05/17/24 0940 05/17/24 0943        Hemodialysis Catheter 05/25/23 1336 left internal jugular   Placement Date/Time: 05/25/23 1336   Present Prior to Hospital Arrival?: No  Hand Hygiene: Performed  Barrier Precautions: Performed  Skin Antisepsis: ChloraPrep  Hemodialysis Catheter Type: Tunneled catheter  Location: left internal jugular  Catheter...   Site Assessment No drainage  --    Line Securement Device Secured with sutureless device  --    Dressing Type CHG impregnated dressing/sponge  --    Venous Patency/Care accessed;blood return present;flushed w/o difficulty  --    Arterial Patency/Care accessed;blood return present;flushed w/o difficulty  --    During Hemodialysis Assessment   Blood Flow Rate (mL/min)  --  400 mL/min   Dialysate Flow Rate (mL/min)  --  700 ml/min   Ultrafiltration Rate (mL/Hr)  --  610 mL/Hr   Arteriovenous Lines Secure  --  Yes   Arterial Pressure (mmHg)  --  -120 mmHg   Venous Pressure (mmHg)  --  140   Blood Volume Processed (Liters)  --  0 L   UF Removed (mL)  --  0 mL   TMP  --  40   Venous Line in Air Detector  --  Yes   Intake (mL)  --  250 mL   Intra-Hemodialysis Comments  --  HD started     Patient arrived [facility] by stretcher. Maintenance HD started via left subclavian CVC.

## 2024-05-17 NOTE — PROGRESS NOTES
05/17/24 1315 05/17/24 1320        Hemodialysis Catheter 05/25/23 1336 left internal jugular   Placement Date/Time: 05/25/23 1336   Present Prior to Hospital Arrival?: No  Hand Hygiene: Performed  Barrier Precautions: Performed  Skin Antisepsis: ChloraPrep  Hemodialysis Catheter Type: Tunneled catheter  Location: left internal jugular  Catheter...   Site Assessment  --  No drainage   Line Securement Device  --  Secured with sutureless device   Dressing Type  --  CHG impregnated dressing/sponge   Venous Patency/Care  --  deaccessed;flushed w/o difficulty;normal saline locked   Arterial Patency/Care  --  deaccessed;flushed w/o difficulty;normal saline locked   During Hemodialysis Assessment   Blood Flow Rate (mL/min) 400 mL/min  --    Dialysate Flow Rate (mL/min) 700 ml/min  --    Ultrafiltration Rate (mL/Hr) 610 mL/Hr  --    Arteriovenous Lines Secure Yes  --    Arterial Pressure (mmHg) -120 mmHg  --    Venous Pressure (mmHg) 140  --    UF Removed (mL) 1808 mL  --    TMP 40  --    Venous Line in Air Detector Yes  --    Intra-Hemodialysis Comments HD completed  --    Post-Hemodialysis Assessment   Rinseback Volume (mL) 250 mL  --    Blood Volume Processed (Liters) 78.8 L  --    Dialyzer Clearance Moderately streaked  --    Duration of Treatment 210 minutes  --    Total UF (mL) 1808 mL  --    Net Fluid Removal 1300  --    Patient Response to Treatment Hollie. well  --      Patient left [facility] by stretcher NAD.   no

## 2024-05-17 NOTE — PROGRESS NOTES
Hospital Medicine  Progress note    Team: [facility] HOSP MED W Olga Washington MD  Admit Date: 5/7/2024  Code status: Full Code    Principal Problem:  Severe sepsis    Interval hx: No new events    PEx  Temp:  [97.3 °F (36.3 °C)-98.7 °F (37.1 °C)]   Pulse:  [67-78]   Resp:  [16-18]   BP: (119-152)/(56-87)   SpO2:  [90 %-99 %]   No intake or output data in the 24 hours ending 05/17/24 0923      General Appearance: no acute distress, WD, elderly, ill-appearing  Heart: regular rate and rhythm, no heave  Respiratory: Normal respiratory effort, symmetric excursion, bilateral vesicular breath sounds   Abdomen: Soft, non-tender; bowel sounds active  Skin: intact, no rash, no ulcers  Neurologic:  No focal numbness or weakness  Mental status: Alert, oriented x 4, affect appropriate    Recent Labs   Lab 05/13/24  0509 05/14/24  0454 05/17/24  0521   WBC 4.76 5.11 4.47   HGB 11.6* 11.0* 11.8*   HCT 36.9* 35.1* 37.0    149* 196     Recent Labs   Lab 05/14/24  0454 05/15/24  0306 05/17/24  0521   * 133* 132*   K 4.6 4.8 4.4   CL 99 99 100   CO2 23 20* 19*   BUN 35* 39* 38*   CREATININE 5.0* 5.5* 5.0*   * 145* 126*   CALCIUM 8.2* 8.7 8.9   PHOS  --  4.8* 4.6*     Recent Labs   Lab 05/15/24  0306 05/17/24  0521   ALBUMIN 2.3* 2.4*        Recent Labs   Lab 05/15/24  1929 05/16/24  0950 05/16/24  1152 05/16/24  1631 05/16/24  1935 05/17/24  0726   POCTGLUCOSE 194* 204* 184* 109 130* 122*       Scheduled Meds:   sodium chloride 0.9%   Intravenous Once    allopurinoL  50 mg Oral Every other day    apixaban  5 mg Oral BID    atorvastatin  80 mg Oral Daily    carvediloL  6.25 mg Oral BID    clopidogreL  75 mg Oral Daily    famotidine  20 mg Oral Daily    Lactobacillus rhamnosus GG  1 capsule Oral Daily    mupirocin   Nasal BID    sertraline  100 mg Oral Daily    traZODone  25 mg Oral QHS     Continuous Infusions:  As Needed:    Current Facility-Administered Medications:     dextrose 10%, 12.5 g, Intravenous, PRN    dextrose  10%, 25 g, Intravenous, PRN    glucagon (human recombinant), 1 mg, Intramuscular, PRN    hydrALAZINE, 25 mg, Oral, Q8H PRN    insulin aspart U-100, 0-10 Units, Subcutaneous, Q6H PRN    oxyCODONE, 5 mg, Oral, Q6H PRN    QUEtiapine, 25 mg, Oral, Daily PRN    sodium chloride 0.9%, 10 mL, Intravenous, PRN    Assessment and Plan  / Problems managed today    * Severe sepsis  - ID consulted   - 2/2 pan-sensitive E coli UTI; continue Ropcehin through 5/13 and end of 5/14     ESRD (end stage renal disease)  HD per nephro; appreciate assistance     Chronic combined systolic and diastolic heart failure  - Continue Coreg  - HD for volume status       Encephalopathy  - Vascular neurology consulted. Appreciate assistance   - CTH, MRI brain negative, Tox negative  - Delirium precautions   - Replace electrolytes to keep Mg > 2, Phos > 3, and K > 4  - Nephrology HD MWF, dialyzed 5/8/24    Appears to be functionally declining slowly. Unclear true baseline. Vascular neurology signed off.   Possibly acute exacerbation with UTI/sepsis. Continue antibiotics through 5/13 and end of 5/14   Improving daily, but slowly     Impaired functional mobility and endurance  - PT/OT consulted  - Possible SNF placement if she is a candidate. If not, will need to discuss [facility] care with family      Paroxysmal atrial fibrillation  - Continue coreg  - Continue Eliquis     Anemia due to chronic kidney disease, on chronic dialysis  - At Baseline  - Epo per nephro     CAD (coronary artery disease)  - Continue home plavix     Type 2 diabetes mellitus with hyperglycemia, with long-term current use of insulin  - Inpatient glucose goal 140-180  - Most Recent HbA1c: 8.0 in 4/2024   - MDSSI   - Glucose checks POCT qACHS ordered       CAROLIN (generalized anxiety disorder)  - Continue home sertraline and trazodone     Peripheral vascular disease  - Continue plavix       Diet:   regular diet  GI PPx: not needed  DVT PPx:  apixaban  Airways: room air  Wounds:  none    Goals of Care:  Return to prior functional status     Discharge Planning   DEVAN: 5/17/2024   Is the patient medically ready for discharge?:     Reason for patient still in hospital (select all that apply): Patient trending condition and Treatment  Discharge Plan A: Skilled Nursing Facility   Discharge Delays: None known at this time    Olga Washington MD

## 2024-05-17 NOTE — NURSING
"1650 -   Nurse and Charge nurse heard [employer] alarming from room. Upon entering the room,  pt was found sitting upright on floor next to bed, when asked what happened pt states "I don't know".  Staff assisted pt back to bed. Pt.did not lose consciousness.  She denies headache, neck pain, back pain, abdominal pain, chest pain, shortness of breath.  She has normal neck range of motion.  VSS. No signs or symptoms of injury. Educated pt on the importance of calling for help with assistance to prevent falls and injuries, pt verbalized understanding.  Padmini HAYDEN made aware via secure chat. CT head ordered.         "

## 2024-05-17 NOTE — PROGRESS NOTES
[facility] Medicine  Progress note    Team: [facility] MED W Olga Washington MD  Admit Date: 5/7/2024  Code status: Full Code    Principal Problem:  Severe sepsis    Interval hx: No new events    PEx  Temp:  [97.3 °F (36.3 °C)-98.7 °F (37.1 °C)]   Pulse:  [67-84]   Resp:  [16-18]   BP: (102-173)/(53-93)   SpO2:  [90 %-100 %]     Intake/Output Summary (Last 24 hours) at 5/17/2024 1455  Last data filed at 5/17/2024 1315  Gross per 24 hour   Intake --   Output 1809 ml   Net -1809 ml       General Appearance: no acute distress, WD, elderly, ill-appearing  Heart: regular rate and rhythm, no heave  Respiratory: Normal respiratory effort, symmetric excursion, bilateral vesicular breath sounds   Abdomen: Soft, non-tender; bowel sounds active  Skin: intact, no rash, no ulcers  Neurologic:  No focal numbness or weakness  Mental status: Alert, oriented x 4, affect appropriate    Recent Labs   Lab 05/13/24  0509 05/14/24  0454 05/17/24  0521   WBC 4.76 5.11 4.47   HGB 11.6* 11.0* 11.8*   HCT 36.9* 35.1* 37.0    149* 196     Recent Labs   Lab 05/14/24  0454 05/15/24  0306 05/17/24  0521   * 133* 132*   K 4.6 4.8 4.4   CL 99 99 100   CO2 23 20* 19*   BUN 35* 39* 38*   CREATININE 5.0* 5.5* 5.0*   * 145* 126*   CALCIUM 8.2* 8.7 8.9   PHOS  --  4.8* 4.6*     Recent Labs   Lab 05/15/24  0306 05/17/24  0521   ALBUMIN 2.3* 2.4*        Recent Labs   Lab 05/15/24  1929 05/16/24  0950 05/16/24  1152 05/16/24  1631 05/16/24  1935 05/17/24  0726   POCTGLUCOSE 194* 204* 184* 109 130* 122*       Scheduled Meds:   allopurinoL  50 mg Oral Every other day    apixaban  5 mg Oral BID    atorvastatin  80 mg Oral Daily    carvediloL  6.25 mg Oral BID    clopidogreL  75 mg Oral Daily    famotidine  20 mg Oral Daily    Lactobacillus rhamnosus GG  1 capsule Oral Daily    mupirocin   Nasal BID    sertraline  100 mg Oral Daily    traZODone  25 mg Oral QHS     Continuous Infusions:  As Needed:    Current Facility-Administered Medications:      dextrose 10%, 12.5 g, Intravenous, PRN    dextrose 10%, 25 g, Intravenous, PRN    glucagon (human recombinant), 1 mg, Intramuscular, PRN    hydrALAZINE, 25 mg, Oral, Q8H PRN    insulin aspart U-100, 0-10 Units, Subcutaneous, Q6H PRN    oxyCODONE, 5 mg, Oral, Q6H PRN    QUEtiapine, 25 mg, Oral, Daily PRN    sodium chloride 0.9%, 10 mL, Intravenous, PRN    Assessment and Plan  / Problems managed today    * Severe sepsis  - ID consulted   - 2/2 pan-sensitive E coli UTI; continue Ropcehin through 5/13 and end of 5/14     ESRD (end stage renal disease)  HD per nephro; appreciate assistance     Chronic combined systolic and diastolic heart failure  - Continue Coreg  - HD for volume status       Encephalopathy  - Vascular neurology consulted. Appreciate assistance   - CTH, MRI brain negative, Tox negative  - Delirium precautions   - Replace electrolytes to keep Mg > 2, Phos > 3, and K > 4  - Nephrology HD MWF, dialyzed 5/8/24    Appears to be functionally declining slowly. Unclear true baseline. Vascular neurology signed off.   Possibly acute exacerbation with UTI/sepsis. Continue antibiotics through 5/13 and end of 5/14   Improving daily, but slowly     Impaired functional mobility and endurance  - PT/OT consulted  - Possible SNF placement if she is a candidate. If not, will need to discuss [facility] care with family      Paroxysmal atrial fibrillation  - Continue coreg  - Continue Eliquis     Anemia due to chronic kidney disease, on chronic dialysis  - At Baseline  - Epo per nephro     CAD (coronary artery disease)  - Continue home plavix     Type 2 diabetes mellitus with hyperglycemia, with long-term current use of insulin  - Inpatient glucose goal 140-180  - Most Recent HbA1c: 8.0 in 4/2024   - MDSSI   - Glucose checks POCT qACHS ordered       CAROLIN (generalized anxiety disorder)  - Continue home sertraline and trazodone     Peripheral vascular disease  - Continue plavix       Diet:   regular diet  GI PPx: not  needed  DVT PPx:  apixaban  Airways: room air  Wounds: none    Goals of Care:  Return to prior functional status     Discharge Planning   DEVAN: 5/17/2024   Is the patient medically ready for discharge?:     Reason for patient still in hospital (select all that apply): Patient trending condition and Treatment  Discharge Plan A: Skilled Nursing Facility   Discharge Delays: None known at this time    Olga Washington MD

## 2024-05-17 NOTE — PROGRESS NOTES
[facility] Medicine  Progress note    Team: [facility] [place] [facility]OSP MED W Olga Washington MD  Admit Date: 5/7/2024  Code status: Full Code    Principal Problem:  Severe sepsis    Interval hx: No new events    PEx  Temp:  [97.3 °F (36.3 °C)-98.7 °F (37.1 °C)]   Pulse:  [67-84]   Resp:  [16-18]   BP: (102-173)/(53-93)   SpO2:  [90 %-100 %]     Intake/Output Summary (Last 24 hours) at 5/17/2024 1455  Last data filed at 5/17/2024 1315  Gross per 24 hour   Intake --   Output 1809 ml   Net -1809 ml       General Appearance: no acute distress, WD, elderly, ill-appearing  Heart: regular rate and rhythm, no heave  Respiratory: Normal respiratory effort, symmetric excursion, bilateral vesicular breath sounds   Abdomen: Soft, non-tender; bowel sounds active  Skin: intact, no rash, no ulcers  Neurologic:  No focal numbness or weakness  Mental status: Alert, oriented x 4, affect appropriate    Recent Labs   Lab 05/13/24  0509 05/14/24  0454 05/17/24  0521   WBC 4.76 5.11 4.47   HGB 11.6* 11.0* 11.8*   HCT 36.9* 35.1* 37.0    149* 196     Recent Labs   Lab 05/14/24  0454 05/15/24  0306 05/17/24  0521   * 133* 132*   K 4.6 4.8 4.4   CL 99 99 100   CO2 23 20* 19*   BUN 35* 39* 38*   CREATININE 5.0* 5.5* 5.0*   * 145* 126*   CALCIUM 8.2* 8.7 8.9   PHOS  --  4.8* 4.6*     Recent Labs   Lab 05/15/24  0306 05/17/24  0521   ALBUMIN 2.3* 2.4*        Recent Labs   Lab 05/15/24  1929 05/16/24  0950 05/16/24  1152 05/16/24  1631 05/16/24  1935 05/17/24  0726   POCTGLUCOSE 194* 204* 184* 109 130* 122*       Scheduled Meds:   allopurinoL  50 mg Oral Every other day    apixaban  5 mg Oral BID    atorvastatin  80 mg Oral Daily    carvediloL  6.25 mg Oral BID    clopidogreL  75 mg Oral Daily    famotidine  20 mg Oral Daily    Lactobacillus rhamnosus GG  1 capsule Oral Daily    mupirocin   Nasal BID    sertraline  100 mg Oral Daily    traZODone  25 mg Oral QHS     Continuous Infusions:  As Needed:    Current Facility-Administered Medications:      dextrose 10%, 12.5 g, Intravenous, PRN    dextrose 10%, 25 g, Intravenous, PRN    glucagon (human recombinant), 1 mg, Intramuscular, PRN    hydrALAZINE, 25 mg, Oral, Q8H PRN    insulin aspart U-100, 0-10 Units, Subcutaneous, Q6H PRN    oxyCODONE, 5 mg, Oral, Q6H PRN    QUEtiapine, 25 mg, Oral, Daily PRN    sodium chloride 0.9%, 10 mL, Intravenous, PRN    Assessment and Plan  / Problems managed today    * Severe sepsis  - ID consulted   - 2/2 pan-sensitive E coli UTI; continue Ropcehin through 5/13 and end of 5/14     ESRD (end stage renal disease)  HD per nephro; appreciate assistance     Chronic combined systolic and diastolic heart failure  - Continue Coreg  - HD for volume status       Encephalopathy  - Vascular neurology consulted. Appreciate assistance   - CTH, MRI brain negative, Tox negative  - Delirium precautions   - Replace electrolytes to keep Mg > 2, Phos > 3, and K > 4  - Nephrology HD MWF, dialyzed 5/8/24    Appears to be functionally declining slowly. Unclear true baseline. Vascular neurology signed off.   Possibly acute exacerbation with UTI/sepsis. Continue antibiotics through 5/13 and end of 5/14   Improving daily, but slowly     Impaired functional mobility and endurance  - PT/OT consulted  - Possible SNF placement if she is a candidate. If not, will need to discuss alf care with family      Paroxysmal atrial fibrillation  - Continue coreg  - Continue Eliquis     Anemia due to chronic kidney disease, on chronic dialysis  - At Baseline  - Epo per nephro     CAD (coronary artery disease)  - Continue home plavix     Type 2 diabetes mellitus with hyperglycemia, with long-term current use of insulin  - Inpatient glucose goal 140-180  - Most Recent HbA1c: 8.0 in 4/2024   - MDSSI   - Glucose checks POCT qACHS ordered       CAROLIN (generalized anxiety disorder)  - Continue home sertraline and trazodone     Peripheral vascular disease  - Continue plavix       Diet:   regular diet  GI PPx: not  needed  DVT PPx:  apixaban  Airways: room air  Wounds: none    Goals of Care:  Return to prior functional status     Discharge Planning   DEVAN: 5/17/2024   Is the patient medically ready for discharge?:     Reason for patient still in hospital (select all that apply): Patient trending condition and Treatment  Discharge Plan A: Skilled Nursing Facility   Discharge Delays: None known at this time    Olga Washington MD

## 2024-05-17 NOTE — PROGRESS NOTES
NEPHROLOGY HEMODIALYSIS NOTE    Suyapa Connelly is a 58 y.o. female currently on hemodialysis for removal of uremic toxins and volume management.     Patient seen and evaluated on hemodialysis, tolerating treatment, see HD flowsheet for vitals and assessments.    No Hypotension, chest pain, shortness of breath, cramping, nausea or vomiting.      Labs have been reviewed and the dialysate bath has been adjusted.    Labs:      Recent Labs   Lab 05/14/24  0454 05/15/24  0306 05/17/24  0521   * 133* 132*   K 4.6 4.8 4.4   CL 99 99 100   CO2 23 20* 19*   BUN 35* 39* 38*   CREATININE 5.0* 5.5* 5.0*   CALCIUM 8.2* 8.7 8.9   PHOS  --  4.8* 4.6*       Recent Labs   Lab 05/13/24  0509 05/14/24  0454 05/17/24  0521   WBC 4.76 5.11 4.47   HGB 11.6* 11.0* 11.8*   HCT 36.9* 35.1* 37.0    149* 196          Assessment/Plan: ESRD  - Seen on dialysis this morning, tolerating session with current UFR, no complications.    - Will continue dialysis treatments while in-patient  - Continue to monitor intake and output   - Renally dose medications  - Pre/Post dialysis treatment weights  - Renal diet  - Will continue to follow closely.

## 2024-05-18 LAB
POCT GLUCOSE: 101 MG/DL (ref 70–110)
POCT GLUCOSE: 164 MG/DL (ref 70–110)
POCT GLUCOSE: 171 MG/DL (ref 70–110)
POCT GLUCOSE: 188 MG/DL (ref 70–110)
POCT GLUCOSE: 195 MG/DL (ref 70–110)

## 2024-05-18 PROCEDURE — 25000003 PHARM REV CODE 250: Mod: HCNC | Performed by: STUDENT IN AN ORGANIZED HEALTH CARE EDUCATION/TRAINING PROGRAM

## 2024-05-18 PROCEDURE — 25000003 PHARM REV CODE 250: Mod: HCNC | Performed by: HOSPITALIST

## 2024-05-18 PROCEDURE — 25000003 PHARM REV CODE 250: Mod: HCNC | Performed by: NURSE PRACTITIONER

## 2024-05-18 PROCEDURE — 99232 SBSQ HOSP IP/OBS MODERATE 35: CPT | Mod: HCNC,,, | Performed by: NURSE PRACTITIONER

## 2024-05-18 PROCEDURE — 25000003 PHARM REV CODE 250: Mod: HCNC | Performed by: INTERNAL MEDICINE

## 2024-05-18 PROCEDURE — 21400001 HC TELEMETRY ROOM: Mod: HCNC

## 2024-05-18 RX ORDER — SODIUM CHLORIDE 9 MG/ML
INJECTION, SOLUTION INTRAVENOUS ONCE
Status: COMPLETED | OUTPATIENT
Start: 2024-05-20 | End: 2024-05-20

## 2024-05-18 RX ORDER — SEVELAMER CARBONATE 800 MG/1
800 TABLET, FILM COATED ORAL
Status: DISCONTINUED | OUTPATIENT
Start: 2024-05-18 | End: 2024-05-21 | Stop reason: HOSPADM

## 2024-05-18 RX ADMIN — CARVEDILOL 6.25 MG: 6.25 TABLET, FILM COATED ORAL at 08:05

## 2024-05-18 RX ADMIN — MUPIROCIN: 20 OINTMENT TOPICAL at 08:05

## 2024-05-18 RX ADMIN — Medication 1 CAPSULE: at 08:05

## 2024-05-18 RX ADMIN — CLOPIDOGREL BISULFATE 75 MG: 75 TABLET ORAL at 08:05

## 2024-05-18 RX ADMIN — INSULIN ASPART 2 UNITS: 100 INJECTION, SOLUTION INTRAVENOUS; SUBCUTANEOUS at 12:05

## 2024-05-18 RX ADMIN — INSULIN ASPART 2 UNITS: 100 INJECTION, SOLUTION INTRAVENOUS; SUBCUTANEOUS at 05:05

## 2024-05-18 RX ADMIN — INSULIN ASPART 2 UNITS: 100 INJECTION, SOLUTION INTRAVENOUS; SUBCUTANEOUS at 08:05

## 2024-05-18 RX ADMIN — ALLOPURINOL 50 MG: 300 TABLET ORAL at 08:05

## 2024-05-18 RX ADMIN — SEVELAMER CARBONATE 800 MG: 800 TABLET, FILM COATED ORAL at 05:05

## 2024-05-18 RX ADMIN — APIXABAN 5 MG: 5 TABLET, FILM COATED ORAL at 09:05

## 2024-05-18 RX ADMIN — FAMOTIDINE 20 MG: 20 TABLET ORAL at 08:05

## 2024-05-18 RX ADMIN — APIXABAN 5 MG: 5 TABLET, FILM COATED ORAL at 08:05

## 2024-05-18 RX ADMIN — MUPIROCIN: 20 OINTMENT TOPICAL at 09:05

## 2024-05-18 RX ADMIN — SERTRALINE HYDROCHLORIDE 100 MG: 50 TABLET ORAL at 08:05

## 2024-05-18 RX ADMIN — CARVEDILOL 6.25 MG: 6.25 TABLET, FILM COATED ORAL at 09:05

## 2024-05-18 RX ADMIN — TRAZODONE HYDROCHLORIDE 25 MG: 50 TABLET ORAL at 09:05

## 2024-05-18 RX ADMIN — ATORVASTATIN CALCIUM 80 MG: 40 TABLET, FILM COATED ORAL at 08:05

## 2024-05-18 NOTE — ASSESSMENT & PLAN NOTE
ESRD on iHD MWF  Dr. Jesika MATA TDC  + residual renal fx    Missed HD session on Monday prior to presentation.    Plan/Recommendations:    -Continue MWD iHD schedule, next HD 5/20  -strict I/O's  -Renal diet when advanced  -phos WNL, will monitor for now.  No need for phos binders at this time  -Hgb @ goal for ESRD, continue trending.  No need for NAEEM therapy.

## 2024-05-18 NOTE — CONSULTS
[facility]  Nephrology  Consult Note    Patient Name: Suyapa Connelly  MRN: [id number]  Admission Date: 5/7/2024  Hospital Length of Stay: 11 days  Attending Provider: Olga Washington MD   Primary Care Physician: Magen Christensen MD  Principal Problem:Severe sepsis    Consults  Subjective:       Interval History: HD yesterday, tolerated well. Net UF 1.3L.     Review of patient's allergies indicates:   Allergen Reactions    Ciprofloxacin Itching    Contrast media      Kidney injury    Iodine      Kidney injury    Pcn [penicillins]      Rash; tolerated ceftriaxone on 1/13/20     Current Facility-Administered Medications   Medication Frequency    [START ON 5/20/2024] 0.9%  NaCl infusion Once    allopurinol split tablet 50 mg Every other day    apixaban tablet 5 mg BID    atorvastatin tablet 80 mg Daily    carvediloL tablet 6.25 mg BID    clopidogreL tablet 75 mg Daily    dextrose 10% bolus 125 mL 125 mL PRN    dextrose 10% bolus 250 mL 250 mL PRN    famotidine tablet 20 mg Daily    glucagon (human recombinant) injection 1 mg PRN    hydrALAZINE tablet 25 mg Q8H PRN    insulin aspart U-100 pen 0-10 Units Q6H PRN    Lactobacillus rhamnosus GG capsule 1 capsule Daily    mupirocin 2 % ointment BID    oxyCODONE immediate release tablet 5 mg Q6H PRN    QUEtiapine tablet 25 mg Daily PRN    sertraline tablet 100 mg Daily    sodium chloride 0.9% flush 10 mL PRN    trazodone split tablet 25 mg QHS       Objective:     Vital Signs (Most Recent):  Temp: 96.3 °F (35.7 °C) (05/18/24 1112)  Pulse: 71 (05/18/24 1112)  Resp: 16 (05/18/24 1112)  BP: 127/74 (05/18/24 1112)  SpO2: (!) 93 % (05/18/24 1112) Vital Signs (24h Range):  Temp:  [96.3 °F (35.7 °C)-98 °F (36.7 °C)] 96.3 °F (35.7 °C)  Pulse:  [71-84] 71  Resp:  [16-18] 16  SpO2:  [93 %-100 %] 93 %  BP: (102-151)/() 127/74     Weight: 69.1 kg (152 lb 5.4 oz) (05/16/24 0514)  Body mass index is 46.49 kg/m².  Body surface area is 1.53 meters squared.    I/O last 3  completed shifts:  In: 180 [P.O.:180]  Out: 1809 [Other:1808; Stool:1]     Physical Exam  Vitals and nursing note reviewed.   Eyes:      Conjunctiva/sclera: Conjunctivae normal.   Cardiovascular:      Rate and Rhythm: Normal rate.      Pulses: Normal pulses.   Pulmonary:      Effort: Pulmonary effort is normal.   Musculoskeletal:      Right lower leg: No edema.      Left lower leg: No edema.   Neurological:      Mental Status: She is alert. Mental status is at baseline.   Psychiatric:         Mood and Affect: Mood normal.         Behavior: Behavior normal.        Significant Labs:  CBC:   Recent Labs   Lab 05/17/24  0521   WBC 4.47   RBC 3.78*   HGB 11.8*   HCT 37.0      MCV 98   MCH 31.2*   MCHC 31.9*     CMP:   Recent Labs   Lab 05/17/24 0521   *   CALCIUM 8.9   ALBUMIN 2.4*   *   K 4.4   CO2 19*      BUN 38*   CREATININE 5.0*     All labs within the past 24 hours have been reviewed.         Assessment/Plan:     Renal/  ESRD (end stage renal disease)  ESRD on iHD MWF  Dr. Jesika MATA TD  + residual renal fx    Missed HD session on Monday prior to presentation.    Plan/Recommendations:    -Continue MWD iHD schedule, next HD 5/20  -strict I/O's  -Renal diet when advanced  -Sevelamer 800 TIDWM  -Hgb @ goal for ESRD, continue trending.  No need for NAEEM therapy.    ID  * Severe sepsis  -management primary        Thank you for your consult. I will follow-up with patient. Please contact us if you have any additional questions.    Katie Monique, CHRIS  Nephrology  [facility]

## 2024-05-18 NOTE — SUBJECTIVE & OBJECTIVE
Interval History: HD yesterday, tolerated well. Net UF 1.3L.     Review of patient's allergies indicates:   Allergen Reactions    Ciprofloxacin Itching    Contrast media      Kidney injury    Iodine      Kidney injury    Pcn [penicillins]      Rash; tolerated ceftriaxone on 1/13/20     Current Facility-Administered Medications   Medication Frequency    [START ON 5/20/2024] 0.9%  NaCl infusion Once    allopurinol split tablet 50 mg Every other day    apixaban tablet 5 mg BID    atorvastatin tablet 80 mg Daily    carvediloL tablet 6.25 mg BID    clopidogreL tablet 75 mg Daily    dextrose 10% bolus 125 mL 125 mL PRN    dextrose 10% bolus 250 mL 250 mL PRN    famotidine tablet 20 mg Daily    glucagon (human recombinant) injection 1 mg PRN    hydrALAZINE tablet 25 mg Q8H PRN    insulin aspart U-100 pen 0-10 Units Q6H PRN    Lactobacillus rhamnosus GG capsule 1 capsule Daily    mupirocin 2 % ointment BID    oxyCODONE immediate release tablet 5 mg Q6H PRN    QUEtiapine tablet 25 mg Daily PRN    sertraline tablet 100 mg Daily    sodium chloride 0.9% flush 10 mL PRN    trazodone split tablet 25 mg QHS       Objective:     Vital Signs (Most Recent):  Temp: 96.3 °F (35.7 °C) (05/18/24 1112)  Pulse: 71 (05/18/24 1112)  Resp: 16 (05/18/24 1112)  BP: 127/74 (05/18/24 1112)  SpO2: (!) 93 % (05/18/24 1112) Vital Signs (24h Range):  Temp:  [96.3 °F (35.7 °C)-98 °F (36.7 °C)] 96.3 °F (35.7 °C)  Pulse:  [71-84] 71  Resp:  [16-18] 16  SpO2:  [93 %-100 %] 93 %  BP: (102-151)/() 127/74     Weight: 69.1 kg (152 lb 5.4 oz) (05/16/24 0514)  Body mass index is 46.49 kg/m².  Body surface area is 1.53 meters squared.    I/O last 3 completed shifts:  In: 180 [P.O.:180]  Out: 1809 [Other:1808; Stool:1]     Physical Exam  Vitals and nursing note reviewed.   Eyes:      Conjunctiva/sclera: Conjunctivae normal.   Cardiovascular:      Rate and Rhythm: Normal rate.      Pulses: Normal pulses.   Pulmonary:      Effort: Pulmonary effort is normal.    Musculoskeletal:      Right lower leg: No edema.      Left lower leg: No edema.   Neurological:      Mental Status: She is alert. Mental status is at baseline.   Psychiatric:         Mood and Affect: Mood normal.         Behavior: Behavior normal.        Significant Labs:  CBC:   Recent Labs   Lab 05/17/24  0521   WBC 4.47   RBC 3.78*   HGB 11.8*   HCT 37.0      MCV 98   MCH 31.2*   MCHC 31.9*     CMP:   Recent Labs   Lab 05/17/24  0521   *   CALCIUM 8.9   ALBUMIN 2.4*   *   K 4.4   CO2 19*      BUN 38*   CREATININE 5.0*     All labs within the past 24 hours have been reviewed.

## 2024-05-19 LAB
POCT GLUCOSE: 180 MG/DL (ref 70–110)
POCT GLUCOSE: 181 MG/DL (ref 70–110)
POCT GLUCOSE: 184 MG/DL (ref 70–110)
POCT GLUCOSE: 230 MG/DL (ref 70–110)

## 2024-05-19 PROCEDURE — 25000003 PHARM REV CODE 250: Mod: HCNC | Performed by: NURSE PRACTITIONER

## 2024-05-19 PROCEDURE — 63600175 PHARM REV CODE 636 W HCPCS: Mod: HCNC | Performed by: NURSE PRACTITIONER

## 2024-05-19 PROCEDURE — 25000003 PHARM REV CODE 250: Mod: HCNC | Performed by: STUDENT IN AN ORGANIZED HEALTH CARE EDUCATION/TRAINING PROGRAM

## 2024-05-19 PROCEDURE — 21400001 HC TELEMETRY ROOM: Mod: HCNC

## 2024-05-19 PROCEDURE — 25000003 PHARM REV CODE 250: Mod: HCNC | Performed by: HOSPITALIST

## 2024-05-19 RX ADMIN — OXYCODONE 5 MG: 5 TABLET ORAL at 09:05

## 2024-05-19 RX ADMIN — MUPIROCIN: 20 OINTMENT TOPICAL at 08:05

## 2024-05-19 RX ADMIN — INSULIN ASPART 2 UNITS: 100 INJECTION, SOLUTION INTRAVENOUS; SUBCUTANEOUS at 05:05

## 2024-05-19 RX ADMIN — SEVELAMER CARBONATE 800 MG: 800 TABLET, FILM COATED ORAL at 05:05

## 2024-05-19 RX ADMIN — APIXABAN 5 MG: 5 TABLET, FILM COATED ORAL at 09:05

## 2024-05-19 RX ADMIN — CARVEDILOL 6.25 MG: 6.25 TABLET, FILM COATED ORAL at 09:05

## 2024-05-19 RX ADMIN — ATORVASTATIN CALCIUM 80 MG: 40 TABLET, FILM COATED ORAL at 08:05

## 2024-05-19 RX ADMIN — SERTRALINE HYDROCHLORIDE 100 MG: 50 TABLET ORAL at 08:05

## 2024-05-19 RX ADMIN — CARVEDILOL 6.25 MG: 6.25 TABLET, FILM COATED ORAL at 08:05

## 2024-05-19 RX ADMIN — Medication 1 CAPSULE: at 08:05

## 2024-05-19 RX ADMIN — APIXABAN 5 MG: 5 TABLET, FILM COATED ORAL at 08:05

## 2024-05-19 RX ADMIN — TRAZODONE HYDROCHLORIDE 25 MG: 50 TABLET ORAL at 09:05

## 2024-05-19 RX ADMIN — INSULIN ASPART 4 UNITS: 100 INJECTION, SOLUTION INTRAVENOUS; SUBCUTANEOUS at 08:05

## 2024-05-19 RX ADMIN — SEVELAMER CARBONATE 800 MG: 800 TABLET, FILM COATED ORAL at 12:05

## 2024-05-19 RX ADMIN — CLOPIDOGREL BISULFATE 75 MG: 75 TABLET ORAL at 08:05

## 2024-05-19 RX ADMIN — INSULIN ASPART 1 UNITS: 100 INJECTION, SOLUTION INTRAVENOUS; SUBCUTANEOUS at 10:05

## 2024-05-19 RX ADMIN — FAMOTIDINE 20 MG: 20 TABLET ORAL at 08:05

## 2024-05-19 RX ADMIN — SEVELAMER CARBONATE 800 MG: 800 TABLET, FILM COATED ORAL at 08:05

## 2024-05-19 RX ADMIN — INSULIN ASPART 2 UNITS: 100 INJECTION, SOLUTION INTRAVENOUS; SUBCUTANEOUS at 12:05

## 2024-05-19 NOTE — PROGRESS NOTES
Hospital Medicine  Progress note    Team: [facility] W Olga Washington MD  Admit Date: 5/7/2024  Code status: Full Code    Principal Problem:  Severe sepsis    Interval hx: No new events    PEx  Temp:  [97.5 °F (36.4 °C)-98.1 °F (36.7 °C)]   Pulse:  [77-87]   Resp:  [16-18]   BP: (131-153)/(64-87)   SpO2:  [95 %-98 %]   No intake or output data in the 24 hours ending 05/19/24 1151    General Appearance: no acute distress, WD, elderly, ill-appearing  Heart: regular rate and rhythm, no heave  Respiratory: Normal respiratory effort, symmetric excursion, bilateral vesicular breath sounds   Abdomen: Soft, non-tender; bowel sounds active  Skin: intact, no rash, no ulcers  Neurologic:  No focal numbness or weakness  Mental status: Alert, oriented x 4, affect appropriate    Recent Labs   Lab 05/13/24  0509 05/14/24  0454 05/17/24  0521   WBC 4.76 5.11 4.47   HGB 11.6* 11.0* 11.8*   HCT 36.9* 35.1* 37.0    149* 196     Recent Labs   Lab 05/14/24  0454 05/15/24  0306 05/17/24  0521   * 133* 132*   K 4.6 4.8 4.4   CL 99 99 100   CO2 23 20* 19*   BUN 35* 39* 38*   CREATININE 5.0* 5.5* 5.0*   * 145* 126*   CALCIUM 8.2* 8.7 8.9   PHOS  --  4.8* 4.6*     Recent Labs   Lab 05/15/24  0306 05/17/24  0521   ALBUMIN 2.3* 2.4*        Recent Labs   Lab 05/17/24  1744 05/18/24  0712 05/18/24  1110 05/18/24  1616 05/18/24  2128 05/19/24  0855   POCTGLUCOSE 98 195* 171* 164* 188* 230*       Scheduled Meds:   [START ON 5/20/2024] sodium chloride 0.9%   Intravenous Once    allopurinoL  50 mg Oral Every other day    apixaban  5 mg Oral BID    atorvastatin  80 mg Oral Daily    carvediloL  6.25 mg Oral BID    clopidogreL  75 mg Oral Daily    famotidine  20 mg Oral Daily    Lactobacillus rhamnosus GG  1 capsule Oral Daily    mupirocin   Nasal BID    sertraline  100 mg Oral Daily    sevelamer carbonate  800 mg Oral TID WM    traZODone  25 mg Oral QHS     Continuous Infusions:  As Needed:    Current Facility-Administered  Medications:     dextrose 10%, 12.5 g, Intravenous, PRN    dextrose 10%, 25 g, Intravenous, PRN    glucagon (human recombinant), 1 mg, Intramuscular, PRN    hydrALAZINE, 25 mg, Oral, Q8H PRN    insulin aspart U-100, 0-10 Units, Subcutaneous, Q6H PRN    oxyCODONE, 5 mg, Oral, Q6H PRN    QUEtiapine, 25 mg, Oral, Daily PRN    sodium chloride 0.9%, 10 mL, Intravenous, PRN    Assessment and Plan  / Problems managed today    * Severe sepsis  - ID consulted   - 2/2 pan-sensitive E coli UTI; continue Ropcehin through 5/13 and end of 5/14     ESRD (end stage renal disease)  HD per nephro; appreciate assistance     Chronic combined systolic and diastolic heart failure  - Continue Coreg  - HD for volume status       Encephalopathy  - Vascular neurology consulted. Appreciate assistance   - CTH, MRI brain negative, Tox negative  - Delirium precautions   - Replace electrolytes to keep Mg > 2, Phos > 3, and K > 4  - Nephrology HD MWF, dialyzed 5/8/24    Appears to be functionally declining slowly. Unclear true baseline. Vascular neurology signed off.   Possibly acute exacerbation with UTI/sepsis. Continue antibiotics through 5/13 and end of 5/14   Improving daily, but slowly     Impaired functional mobility and endurance  - PT/OT consulted  - Possible SNF placement if she is a candidate. If not, will need to discuss group home care with family      Paroxysmal atrial fibrillation  - Continue coreg  - Continue Eliquis     Anemia due to chronic kidney disease, on chronic dialysis  - At Baseline  - Epo per nephro     CAD (coronary artery disease)  - Continue home plavix     Type 2 diabetes mellitus with hyperglycemia, with long-term current use of insulin  - Inpatient glucose goal 140-180  - Most Recent HbA1c: 8.0 in 4/2024   - MDSSI   - Glucose checks POCT qACHS ordered       CAROLIN (generalized anxiety disorder)  - Continue home sertraline and trazodone     Peripheral vascular disease  - Continue plavix       Diet:   regular diet  GI  PPx: not needed  DVT PPx:  apixaban  Airways: room air  Wounds: none    Goals of Care:  Return to prior functional status     Discharge Planning   DEVAN: 5/20/2024   Is the patient medically ready for discharge?:     Reason for patient still in hospital (select all that apply): Patient trending condition and Treatment  Discharge Plan A: Skilled Nursing Facility   Discharge Delays: None known at this time    Olga Washington MD

## 2024-05-19 NOTE — PROGRESS NOTES
Hospital Medicine  Progress note    Team: [facility] MED W Olga Washington MD  Admit Date: 5/7/2024  Code status: Full Code    Principal Problem:  Severe sepsis    Interval hx: No new events    PEx  Temp:  [96.3 °F (35.7 °C)-98 °F (36.7 °C)]   Pulse:  [71-87]   Resp:  [16-18]   BP: (127-146)/()   SpO2:  [93 %-96 %]   No intake or output data in the 24 hours ending 05/18/24 2313    General Appearance: no acute distress, WD, elderly, ill-appearing  Heart: regular rate and rhythm, no heave  Respiratory: Normal respiratory effort, symmetric excursion, bilateral vesicular breath sounds   Abdomen: Soft, non-tender; bowel sounds active  Skin: intact, no rash, no ulcers  Neurologic:  No focal numbness or weakness  Mental status: Alert, oriented x 4, affect appropriate    Recent Labs   Lab 05/13/24  0509 05/14/24  0454 05/17/24  0521   WBC 4.76 5.11 4.47   HGB 11.6* 11.0* 11.8*   HCT 36.9* 35.1* 37.0    149* 196     Recent Labs   Lab 05/14/24  0454 05/15/24  0306 05/17/24  0521   * 133* 132*   K 4.6 4.8 4.4   CL 99 99 100   CO2 23 20* 19*   BUN 35* 39* 38*   CREATININE 5.0* 5.5* 5.0*   * 145* 126*   CALCIUM 8.2* 8.7 8.9   PHOS  --  4.8* 4.6*     Recent Labs   Lab 05/15/24  0306 05/17/24  0521   ALBUMIN 2.3* 2.4*        Recent Labs   Lab 05/17/24  1615 05/17/24  1744 05/18/24  0712 05/18/24  1110 05/18/24  1616 05/18/24  2128   POCTGLUCOSE 76 98 195* 171* 164* 188*       Scheduled Meds:   [START ON 5/20/2024] sodium chloride 0.9%   Intravenous Once    allopurinoL  50 mg Oral Every other day    apixaban  5 mg Oral BID    atorvastatin  80 mg Oral Daily    carvediloL  6.25 mg Oral BID    clopidogreL  75 mg Oral Daily    famotidine  20 mg Oral Daily    Lactobacillus rhamnosus GG  1 capsule Oral Daily    mupirocin   Nasal BID    sertraline  100 mg Oral Daily    sevelamer carbonate  800 mg Oral TID WM    traZODone  25 mg Oral QHS     Continuous Infusions:  As Needed:    Current Facility-Administered  Medications:     dextrose 10%, 12.5 g, Intravenous, PRN    dextrose 10%, 25 g, Intravenous, PRN    glucagon (human recombinant), 1 mg, Intramuscular, PRN    hydrALAZINE, 25 mg, Oral, Q8H PRN    insulin aspart U-100, 0-10 Units, Subcutaneous, Q6H PRN    oxyCODONE, 5 mg, Oral, Q6H PRN    QUEtiapine, 25 mg, Oral, Daily PRN    sodium chloride 0.9%, 10 mL, Intravenous, PRN    Assessment and Plan  / Problems managed today    * Severe sepsis  - ID consulted   - 2/2 pan-sensitive E coli UTI; continue Ropcehin through 5/13 and end of 5/14     ESRD (end stage renal disease)  HD per nephro; appreciate assistance     Chronic combined systolic and diastolic heart failure  - Continue Coreg  - HD for volume status       Encephalopathy  - Vascular neurology consulted. Appreciate assistance   - CTH, MRI brain negative, Tox negative  - Delirium precautions   - Replace electrolytes to keep Mg > 2, Phos > 3, and K > 4  - Nephrology HD MWF, dialyzed 5/8/24    Appears to be functionally declining slowly. Unclear true baseline. Vascular neurology signed off.   Possibly acute exacerbation with UTI/sepsis. Continue antibiotics through 5/13 and end of 5/14   Improving daily, but slowly     Impaired functional mobility and endurance  - PT/OT consulted  - Possible SNF placement if she is a candidate. If not, will need to discuss assisted care with family      Paroxysmal atrial fibrillation  - Continue coreg  - Continue Eliquis     Anemia due to chronic kidney disease, on chronic dialysis  - At Baseline  - Epo per nephro     CAD (coronary artery disease)  - Continue home plavix     Type 2 diabetes mellitus with hyperglycemia, with long-term current use of insulin  - Inpatient glucose goal 140-180  - Most Recent HbA1c: 8.0 in 4/2024   - MDSSI   - Glucose checks POCT qACHS ordered       CAROLIN (generalized anxiety disorder)  - Continue home sertraline and trazodone     Peripheral vascular disease  - Continue plavix       Diet:   regular diet  GI  PPx: not needed  DVT PPx:  apixaban  Airways: room air  Wounds: none    Goals of Care:  Return to prior functional status     Discharge Planning   DEVAN: 5/20/2024   Is the patient medically ready for discharge?:     Reason for patient still in hospital (select all that apply): Patient trending condition and Treatment  Discharge Plan A: Skilled Nursing Facility   Discharge Delays: None known at this time    Olga Washington MD

## 2024-05-20 VITALS
OXYGEN SATURATION: 95 % | DIASTOLIC BLOOD PRESSURE: 62 MMHG | HEART RATE: 88 BPM | WEIGHT: 152.31 LBS | BODY MASS INDEX: 35.25 KG/M2 | TEMPERATURE: 98 F | SYSTOLIC BLOOD PRESSURE: 152 MMHG | RESPIRATION RATE: 16 BRPM | HEIGHT: 55 IN

## 2024-05-20 LAB
ALBUMIN SERPL BCP-MCNC: 2.5 G/DL (ref 3.5–5.2)
ANION GAP SERPL CALC-SCNC: 10 MMOL/L (ref 8–16)
BASOPHILS # BLD AUTO: 0.05 K/UL (ref 0–0.2)
BASOPHILS NFR BLD: 1.1 % (ref 0–1.9)
BUN SERPL-MCNC: 40 MG/DL (ref 6–20)
CALCIUM SERPL-MCNC: 9.2 MG/DL (ref 8.7–10.5)
CHLORIDE SERPL-SCNC: 100 MMOL/L (ref 95–110)
CO2 SERPL-SCNC: 22 MMOL/L (ref 23–29)
CREAT SERPL-MCNC: 4.9 MG/DL (ref 0.5–1.4)
DIFFERENTIAL METHOD BLD: ABNORMAL
EOSINOPHIL # BLD AUTO: 0.2 K/UL (ref 0–0.5)
EOSINOPHIL NFR BLD: 3.7 % (ref 0–8)
ERYTHROCYTE [DISTWIDTH] IN BLOOD BY AUTOMATED COUNT: 16.9 % (ref 11.5–14.5)
EST. GFR  (NO RACE VARIABLE): 9.8 ML/MIN/1.73 M^2
GLUCOSE SERPL-MCNC: 145 MG/DL (ref 70–110)
HCT VFR BLD AUTO: 35.9 % (ref 37–48.5)
HGB BLD-MCNC: 11.9 G/DL (ref 12–16)
IMM GRANULOCYTES # BLD AUTO: 0.01 K/UL (ref 0–0.04)
IMM GRANULOCYTES NFR BLD AUTO: 0.2 % (ref 0–0.5)
LYMPHOCYTES # BLD AUTO: 1.1 K/UL (ref 1–4.8)
LYMPHOCYTES NFR BLD: 23.2 % (ref 18–48)
MCH RBC QN AUTO: 31.8 PG (ref 27–31)
MCHC RBC AUTO-ENTMCNC: 33.1 G/DL (ref 32–36)
MCV RBC AUTO: 96 FL (ref 82–98)
MONOCYTES # BLD AUTO: 0.5 K/UL (ref 0.3–1)
MONOCYTES NFR BLD: 11.7 % (ref 4–15)
NEUTROPHILS # BLD AUTO: 2.8 K/UL (ref 1.8–7.7)
NEUTROPHILS NFR BLD: 60.1 % (ref 38–73)
NRBC BLD-RTO: 0 /100 WBC
PHOSPHATE SERPL-MCNC: 4.8 MG/DL (ref 2.7–4.5)
PLATELET # BLD AUTO: 217 K/UL (ref 150–450)
PMV BLD AUTO: 11.5 FL (ref 9.2–12.9)
POCT GLUCOSE: 112 MG/DL (ref 70–110)
POCT GLUCOSE: 139 MG/DL (ref 70–110)
POCT GLUCOSE: 164 MG/DL (ref 70–110)
POTASSIUM SERPL-SCNC: 4.3 MMOL/L (ref 3.5–5.1)
RBC # BLD AUTO: 3.74 M/UL (ref 4–5.4)
SODIUM SERPL-SCNC: 132 MMOL/L (ref 136–145)
WBC # BLD AUTO: 4.61 K/UL (ref 3.9–12.7)

## 2024-05-20 PROCEDURE — 97530 THERAPEUTIC ACTIVITIES: CPT | Mod: HCNC

## 2024-05-20 PROCEDURE — 97110 THERAPEUTIC EXERCISES: CPT | Mod: HCNC,CQ

## 2024-05-20 PROCEDURE — 63600175 PHARM REV CODE 636 W HCPCS: Mod: HCNC | Performed by: INTERNAL MEDICINE

## 2024-05-20 PROCEDURE — 36415 COLL VENOUS BLD VENIPUNCTURE: CPT | Mod: HCNC | Performed by: INTERNAL MEDICINE

## 2024-05-20 PROCEDURE — 25000003 PHARM REV CODE 250: Mod: HCNC | Performed by: HOSPITALIST

## 2024-05-20 PROCEDURE — 25000003 PHARM REV CODE 250: Mod: HCNC | Performed by: STUDENT IN AN ORGANIZED HEALTH CARE EDUCATION/TRAINING PROGRAM

## 2024-05-20 PROCEDURE — 25000003 PHARM REV CODE 250: Mod: HCNC | Performed by: NURSE PRACTITIONER

## 2024-05-20 PROCEDURE — 90935 HEMODIALYSIS ONE EVALUATION: CPT | Mod: HCNC,,, | Performed by: INTERNAL MEDICINE

## 2024-05-20 PROCEDURE — 99232 SBSQ HOSP IP/OBS MODERATE 35: CPT | Mod: HCNC,,, | Performed by: NURSE PRACTITIONER

## 2024-05-20 PROCEDURE — 80100016 HC MAINTENANCE HEMODIALYSIS: Mod: HCNC

## 2024-05-20 PROCEDURE — 21400001 HC TELEMETRY ROOM: Mod: HCNC

## 2024-05-20 PROCEDURE — 85025 COMPLETE CBC W/AUTO DIFF WBC: CPT | Mod: HCNC | Performed by: INTERNAL MEDICINE

## 2024-05-20 PROCEDURE — 97112 NEUROMUSCULAR REEDUCATION: CPT | Mod: HCNC,CQ

## 2024-05-20 PROCEDURE — 97110 THERAPEUTIC EXERCISES: CPT | Mod: HCNC

## 2024-05-20 PROCEDURE — 80069 RENAL FUNCTION PANEL: CPT | Mod: HCNC | Performed by: INTERNAL MEDICINE

## 2024-05-20 RX ORDER — INSULIN ASPART INJECTION 100 [IU]/ML
3 INJECTION, SOLUTION SUBCUTANEOUS
Start: 2024-05-20 | End: 2025-05-20

## 2024-05-20 RX ORDER — HEPARIN SODIUM 1000 [USP'U]/ML
1000 INJECTION, SOLUTION INTRAVENOUS; SUBCUTANEOUS
Status: DISCONTINUED | OUTPATIENT
Start: 2024-05-20 | End: 2024-05-21 | Stop reason: HOSPADM

## 2024-05-20 RX ORDER — TALC
6 POWDER (GRAM) TOPICAL NIGHTLY PRN
Status: DISCONTINUED | OUTPATIENT
Start: 2024-05-20 | End: 2024-05-21 | Stop reason: HOSPADM

## 2024-05-20 RX ADMIN — OXYCODONE 5 MG: 5 TABLET ORAL at 09:05

## 2024-05-20 RX ADMIN — TRAZODONE HYDROCHLORIDE 25 MG: 50 TABLET ORAL at 09:05

## 2024-05-20 RX ADMIN — APIXABAN 5 MG: 5 TABLET, FILM COATED ORAL at 09:05

## 2024-05-20 RX ADMIN — CLOPIDOGREL BISULFATE 75 MG: 75 TABLET ORAL at 12:05

## 2024-05-20 RX ADMIN — FAMOTIDINE 20 MG: 20 TABLET ORAL at 12:05

## 2024-05-20 RX ADMIN — SODIUM CHLORIDE: 9 INJECTION, SOLUTION INTRAVENOUS at 08:05

## 2024-05-20 RX ADMIN — SEVELAMER CARBONATE 800 MG: 800 TABLET, FILM COATED ORAL at 05:05

## 2024-05-20 RX ADMIN — Medication 6 MG: at 12:05

## 2024-05-20 RX ADMIN — CARVEDILOL 6.25 MG: 6.25 TABLET, FILM COATED ORAL at 09:05

## 2024-05-20 RX ADMIN — HEPARIN SODIUM 1000 UNITS: 1000 INJECTION, SOLUTION INTRAVENOUS; SUBCUTANEOUS at 11:05

## 2024-05-20 RX ADMIN — Medication 1 CAPSULE: at 12:05

## 2024-05-20 RX ADMIN — ALLOPURINOL 50 MG: 300 TABLET ORAL at 12:05

## 2024-05-20 RX ADMIN — ATORVASTATIN CALCIUM 80 MG: 40 TABLET, FILM COATED ORAL at 12:05

## 2024-05-20 RX ADMIN — APIXABAN 5 MG: 5 TABLET, FILM COATED ORAL at 12:05

## 2024-05-20 RX ADMIN — SEVELAMER CARBONATE 800 MG: 800 TABLET, FILM COATED ORAL at 12:05

## 2024-05-20 RX ADMIN — SERTRALINE HYDROCHLORIDE 100 MG: 50 TABLET ORAL at 12:05

## 2024-05-20 NOTE — PROGRESS NOTES
NEPHROLOGY HEMODIALYSIS NOTE    Suyapa Connelly is a 58 y.o. female currently on hemodialysis for removal of uremic toxins and volume management.     Patient seen and evaluated on hemodialysis, tolerating treatment, see HD flowsheet for vitals and assessments.    No Hypotension, chest pain, shortness of breath, cramping, nausea or vomiting.      Labs have been reviewed and the dialysate bath has been adjusted.    Labs:      Recent Labs   Lab 05/15/24  0306 05/17/24  0521 05/20/24  0513   * 132* 132*   K 4.8 4.4 4.3   CL 99 100 100   CO2 20* 19* 22*   BUN 39* 38* 40*   CREATININE 5.5* 5.0* 4.9*   CALCIUM 8.7 8.9 9.2   PHOS 4.8* 4.6* 4.8*       Recent Labs   Lab 05/14/24  0454 05/17/24  0521 05/20/24  0513   WBC 5.11 4.47 4.61   HGB 11.0* 11.8* 11.9*   HCT 35.1* 37.0 35.9*   * 196 217          Assessment/Plan:ESRD  - Seen on dialysis this morning, tolerating session with current UFR, no complications.    - Will continue dialysis treatments while in-patient  - Continue to monitor intake and output   - Renally dose medications  - Pre/Post dialysis treatment weights  - Renal diet  - Will continue to follow closely.

## 2024-05-20 NOTE — PLAN OF CARE
[facility]      HOME HEALTH ORDERS  FACE TO FACE ENCOUNTER    Patient Name: Suyapa Connelly  YOB: 1965    PCP: Magen Christensen MD   PCP Address: [address] [place] [address]  PCP Phone Number: [phone]  PCP Fax: [phone]    Encounter Date: 5/7/24    Admit to Home Health    Diagnoses:  Active Diagnoses:    Diagnosis Date Noted POA    PRINCIPAL PROBLEM:  Severe sepsis [A41.9, R65.20] 01/17/2022 Yes    Thrombocytopenia [D69.6] 05/14/2024 No    Severe obesity (BMI >= 40) [E66.01] 05/14/2024 Yes    Irritant contact dermatitis due to friction or contact with body fluids, unspecified [L24.A0] 05/14/2024 Yes    Pressure injury of sacral region, unstageable [L89.150] 05/14/2024 Yes    ESRD (end stage renal disease) [N18.6] 04/21/2023 Yes    Hyperkalemia [E87.5] 03/26/2022 Yes    Chronic combined systolic and diastolic heart failure [I50.42] 06/21/2021 Yes    Encephalopathy [G93.40] 05/20/2021 Yes    Impaired functional mobility and endurance [Z74.09] 05/06/2021 Yes    Paroxysmal atrial fibrillation [I48.0] 01/28/2020 Yes    Anemia due to chronic kidney disease, on chronic dialysis [N18.6, D63.1, Z99.2] 01/27/2020 Not Applicable    Type 2 diabetes mellitus with hyperglycemia, with long-term current use of insulin [E11.65, Z79.4] 01/02/2020 Not Applicable    CAD (coronary artery disease) [I25.10] 01/02/2020 Yes    CAROLIN (generalized anxiety disorder) [F41.1] 05/07/2018 Yes    Peripheral vascular disease [I73.9] 10/06/2015 Yes      Problems Resolved During this Admission:    Diagnosis Date Noted Date Resolved POA    On mechanically assisted ventilation [Z99.11] 05/08/2024 05/10/2024 Not Applicable    Moderate malnutrition [E44.0] 05/08/2024 05/10/2024 No       Follow Up Appointments:  Future Appointments   Date Time Provider Department Center   5/29/2024 11:00 AM [facility] MD [facility] [address]   6/7/2024  2:30 PM Geeta Cohen, APRN,ANP-C [facility] ENDOCRN  [place]       Allergies:  Review of patient's allergies indicates:   Allergen Reactions    Ciprofloxacin Itching    Contrast media      Kidney injury    Iodine      Kidney injury    Pcn [penicillins]      Rash; tolerated ceftriaxone on 1/13/20       Medications: Review discharge medications with patient and family and provide education.  Current Discharge Medication List        CONTINUE these medications which have CHANGED    Details   insulin aspart, niacinamide, (FIASP FLEXTOUCH U-100 INSULIN) 100 unit/mL (3 mL) InPn Inject 3 Units into the skin 3 (three) times daily with meals.           CONTINUE these medications which have NOT CHANGED    Details   acetaminophen (TYLENOL) 500 MG tablet Take 500 mg by mouth every 6 (six) hours as needed for Pain.      allopurinoL (ZYLOPRIM) 100 MG tablet Take 0.5 tablets (50 mg total) by mouth every other day.  Qty: 8 tablet, Refills: 11      apixaban (ELIQUIS) 5 mg Tab Take 1 tablet (5 mg total) by mouth 2 (two) times daily.  Qty: 60 tablet, Refills: 11      atorvastatin (LIPITOR) 80 MG tablet Take 1 tablet (80 mg total) by mouth once daily.  Qty: 90 tablet, Refills: 3    Associated Diagnoses: Coronary artery disease, unspecified vessel or lesion type, unspecified whether angina present, unspecified whether native or transplanted heart      blood sugar diagnostic Strp Use to check blood glucose 2 (two) times a day.  Qty: 100 strip, Refills: 0      calcitRIOL (ROCALTROL) 0.5 MCG Cap Take 1 capsule (0.5 mcg total) by mouth once daily.  Qty: 90 capsule, Refills: 3      calcium acetate,phosphat bind, (PHOSLO) 667 mg capsule Take 1 capsule (667 mg total) by mouth 3 (three) times daily with meals.  Qty: 90 capsule, Refills: 11      carvediloL (COREG) 6.25 MG tablet Take 1 tablet (6.25 mg total) by mouth 2 (two) times daily.  Qty: 180 tablet, Refills: 3    Comments: .      clopidogreL (PLAVIX) 75 mg tablet Take 1 tablet (75 mg total) by mouth once daily.  Qty: 90 tablet, Refills: 3  "   Associated Diagnoses: S/P CABG x 1      famotidine (PEPCID) 20 MG tablet Take 1 tablet (20 mg total) by mouth 2 (two) times daily.  Qty: 180 tablet, Refills: 3      hydrALAZINE (APRESOLINE) 50 MG tablet Take 1 tablet (50 mg total) by mouth 3 (three) times daily.  Qty: 270 tablet, Refills: 3    Comments: .      lancets Misc To check BG 3 times daily, to use with insurance preferred meter  Qty: 100 each, Refills: 3      multivitamin (ONE DAILY MULTIVITAMIN) per tablet Take 1 tablet by mouth once daily.      pen needle, diabetic 32 gauge x 5/32" Ndle 1 Units by Misc.(Non-Drug; Combo Route) route before meals as needed (SSI).  Qty: 50 each, Refills: 3      sertraline (ZOLOFT) 100 MG tablet Take 1 tablet (100 mg total) by mouth once daily.  Qty: 90 tablet, Refills: 3    Associated Diagnoses: CAROLIN (generalized anxiety disorder)      sevelamer carbonate (RENVELA) 800 mg Tab Take 2 tablets (1,600 mg total) by mouth 3 (three) times daily.  Qty: 270 tablet, Refills: 3      sodium bicarbonate 650 MG tablet TAKE 1 TABLET(650 MG) BY MOUTH THREE TIMES DAILY  Qty: 90 tablet, Refills: 0      traZODone (DESYREL) 50 MG tablet Take 0.5 tablets (25 mg total) by mouth every evening.  Qty: 15 tablet, Refills: 11           STOP taking these medications       blood-glucose meter Misc Comments:   Reason for Stopping:         blood-glucose meter Misc Comments:   Reason for Stopping:         furosemide (LASIX) 40 MG tablet Comments:   Reason for Stopping:         insulin (LANTUS SOLOSTAR U-100 INSULIN) glargine 100 units/mL SubQ pen Comments:   Reason for Stopping:         isosorbide dinitrate (ISORDIL) 20 MG tablet Comments:   Reason for Stopping:         pregabalin (LYRICA) 100 MG capsule Comments:   Reason for Stopping:                 I have seen and examined this patient within the last 30 days. My clinical findings that support the need for the home health skilled services and home bound status are the following:  Weakness/numbness " causing balance and gait disturbance due to Weakness/Debility making it taxing to leave home.  Requiring assistive device to leave home due to unsteady gait caused by  Weakness/Debility.     Diet:   renal diet    Referrals/ Consults  Physical Therapy to evaluate and treat. Evaluate for home safety and equipment needs; Establish/upgrade home exercise program. Perform / instruct on therapeutic exercises, gait training, transfer training, and Range of Motion.  Occupational Therapy to evaluate and treat. Evaluate home environment for safety and equipment needs. Perform/Instruct on transfers, ADL training, ROM, and therapeutic exercises.    Activities:   activity as tolerated    Nursing:   Agency to admit patient within 24 hours of hospital discharge unless specified on physician order or at patient request    SN to complete comprehensive assessment including routine vital signs. Instruct on disease process and s/s of complications to report to MD. Review/verify medication list sent home with the patient at time of discharge  and instruct patient/caregiver as needed. Frequency may be adjusted depending on start of care date.     Skilled nurse to perform up to 3 visits PRN for symptoms related to diagnosis    Notify MD if SBP > 160 or < 90; DBP > 90 or < 50; HR > 120 or < 50; Temp > 101; O2 < 88%    Ok to schedule additional visits based on staff availability and patient request on consecutive days within the home health episode.    Wound Care Orders  no    I certify that this patient is confined to her home and needs intermittent skilled nursing care, physical therapy, and occupational therapy.

## 2024-05-20 NOTE — PLAN OF CARE
Plan of care reviewed with the patient and spouse at bedside . Understanding verbalized .No acute distress observed.Safety Maintained.Bed at lowest position.Call bell at reach.Bed side table at reach.Instructed Patient to call for assistance whenever required.     Problem: Adult Inpatient Plan of Care  Goal: Plan of Care Review  5/20/2024 0336 by Nikolas Song, RN  Outcome: Progressing  5/20/2024 0335 by Nikolas Song, RN  Outcome: Progressing  Goal: Patient-Specific Goal (Individualized)  Outcome: Progressing  Goal: Absence of Hospital-Acquired Illness or Injury  Outcome: Progressing  Goal: Optimal Comfort and Wellbeing  Outcome: Progressing

## 2024-05-20 NOTE — PT/OT/SLP PROGRESS
Occupational Therapy   Co-treatment  Co-treatment performed due to patient's multiple deficits requiring two skilled therapists to appropriately and safely assess patient's strength and endurance while facilitating functional tasks in addition to accommodating for patient's activity tolerance.        Name: Suyapa Connelly  MRN: [id number]  Admitting Diagnosis:  Severe sepsis       Recommendations:     Discharge Recommendations: Low Intensity Therapy  Discharge Equipment Recommendations:  bedside commode, hospital bed  Hospital bed justification: Patient requires a hospital bed for positioning of the body in ways that are not feasible with an ordinary bed. The patient requires special positioning for pain relief, limited mobility, and/or being unable to independently make changes in body position without the use of a hospital bed. Pillows and wedges will not be adequate for resolving these positional issues.    Barriers to discharge:  None    Assessment:     Suyapa Connelly is a 58 y.o. female with a medical diagnosis of Severe sepsis.  She presents with the following performance deficits affecting function are weakness, impaired endurance, impaired self care skills, impaired functional mobility, impaired balance, impaired cognition, decreased coordination, decreased upper extremity function, decreased lower extremity function, pain.    Pt agreeable to therapy and fairly. Pt is demonstrating some improvements with sitting EOB evidenced by requiring less assistance to maintain sitting balance while seated EOB. However, pt remains significantly limited in ADLs, functional mobility, and functional transfers and is currently not performing tasks at [facility]. Pt would continue to benefit from skilled OT services to maximize functional independence with ADLs and functional mobility, reduce caregiver burden, and facilitate safe discharge in the least restrictive environment.    Rehab Prognosis:  Good; patient would benefit  from acute skilled OT services to address these deficits and reach maximum level of function.       Plan:     Patient to be seen 4 x/week to address the above listed problems via self-care/home management, therapeutic activities, therapeutic exercises  Plan of Care Expires: 06/06/24  Plan of Care Reviewed with: patient    Subjective     Chief Complaint: pain with leg movement but unable to describe  Patient/Family Comments/goals: none reported  Pain/Comfort:  Pain Rating 1: 10/10 (pt did not report location of pain)    Objective:     Communicated with: RN prior to session.  Patient found HOB elevated with telemetry upon OT entry to room.    General Precautions: Standard, aspiration, fall    Orthopedic Precautions:N/A  Braces: N/A  Respiratory Status: Room air     Occupational Performance:     Bed Mobility:    Patient completed Rolling/Turning to Left with maximal assistance  Patient completed Rolling/Turning to Right with maximal assistance  Patient completed Scooting with total assistance and 2 persons  Patient completed Supine to Sit with total assistance and 2 persons  Patient completed Sit to Supine with total assistance and 2 persons   Sitting EOB = min->CGA to maintain sitting balance    AMPAC 6 Click ADL: 12    Treatment & Education:    Therapeutic exercises: pt completed the following UE exercises to maintain/improve UE ROM & strength required for participation/independence with ADLs, IADLs & functional mobility/transfers using sliding board & wheelchair.   - AAROM shoulder flexion x10   - AAROM Elbow flexion/extension x10   - AAROM mod elbow extension x10 (simulated chest press)      Patient left HOB elevated with all lines intact, call button in reach, and RN notified    GOALS:   Multidisciplinary Problems       Occupational Therapy Goals          Problem: Occupational Therapy    Goal Priority Disciplines Outcome Interventions   Occupational Therapy Goal     OT, PT/OT Progressing    Description: Goals to  be met by: 6/6/24     Patient will increase functional independence with ADLs by performing:    Grooming while seated with Contact Guard Assistance.  Sitting at edge of bed 10 minutes with Supervision.  Rolling to Bilateral with [place].   Supine to sit with [place].                         Time Tracking:     OT Date of Treatment: 05/20/24  OT Start Time: 1307  OT Stop Time: 1332  OT Total Time (min): 25 min    Billable Minutes:Therapeutic Activity 15  Therapeutic Exercise 10    OT/MACKENZIE: OT          5/20/2024

## 2024-05-20 NOTE — ASSESSMENT & PLAN NOTE
- follow up evaluation of skin injury.  - pt presented to the ED via EMS after an episode of unresponsiveness at home.  - scattered partial thickness tissue loss and ruptured blister with pink, red, moist wound base to sacrum and left lower buttocks.  - likely related to friction and moisture.  - continue Triad bid/prn. No foam dressings. Sacral foams again removed.  - Immerse surface.  - wedge for offloading.  - turn q2h.  - leodan score documented at 14 with a nutrition sub scale score of 3.  - nursing to maintain pressure injury prevention measures and continue wound care per orders.

## 2024-05-20 NOTE — NURSING
Dialysis tx started to the left chest perm cath per orders placed by CHRIS Monique:    Order Questions    Question Answer   Antibiotics on HD? No   Duration of Treatment 3.5 hours   Dialyzer F180NR   Dialysate Temperature (C) 36.5   Target  mL/min   If unable to maintain flow due to inadequate vascular access patency, patient intolerance (i.e. chest pain, access discomfort) or elevated venous pressure, adjust blood flow rate to a minimum of _____mL/min 100    mL/min   K+ Potassium per Protocol   Ca++ Calcium per Protocol   Na+ Sodium per Protocol   Bicarb Bicarbonate per Protocol   Access to be used Other (please specify)   Target UF 2L   If unable to maintain this UFR due to patient intolerance (i.e. hypotension, chest pain, muscle cramping, nausea or vomiting), adjust UFR to achieve a minimum of _______ liters of UF 0   Fluid Removal Instructions maintain SBP > 90 mmHG

## 2024-05-20 NOTE — ASSESSMENT & PLAN NOTE
- left upper sacral region wound with black eschar to wound base.  - unstageable pressure injury of sacrum.  - continue Triad bid/prn.  - No foam dressings over wound.

## 2024-05-20 NOTE — PLAN OF CARE
Problem: Hemodialysis  Goal: Safe, Effective Therapy Delivery  Outcome: Progressing  Goal: Effective Tissue Perfusion  Outcome: Progressing  Goal: Absence of Infection Signs and Symptoms  Outcome: Progressing     Dialysis tx ended to the left chest perm cath, heparin locked and capped.    Net fluid removed: 2L    Report given to nurse Simon pt awaiting transport from [facility] to room 632 by stretcher.

## 2024-05-20 NOTE — SUBJECTIVE & OBJECTIVE
Subjective:     HPI:  Suyapa Connelly is a 58 year old female with hx of severe PVD ultimately requiring bilateral LE AKAs, ESRD on HD, IDDM2, CAD s/p CABG, Afib on eliquis, debility who initially presented to the ED via EMS after an episode of unresponsiveness at home. Patient was apparently at her mental baseline this morning, when she suddenly became unresponsive per . EMS was called and were unable to arouse patient. She was brought into the ED and intubated shortly after arrival. Of note, patient has had 6 different hospitalizations over the past year related to encephalopathy. Patient was most recently admitted 3/22-3/23/24 for evaluation of steady cognitive decline over the past year. It appears patient had declined over the past year to the extent that she is unable to perform independent ADLs.  not ready for NH placement as of yet.     In the ED, patient afebrile, SBP 120s, HR 80s, intubated at 40% FiO2 Peep 5. CBC grossly unchanged from baseline, no leukocytosis or worsening anemia. CMP without significant electrolyte abnormalities, BUN 59, BGL 130s. Lactic wnl. UA c/f infection with >100 wbc and many bacteria. UDS pan-negative. pH wnl and no hypercarbia on VBG. Patient admitted to MICU for continued management. Skin integrity AVA consulted for evaluation of skin injury.    Hospital Course:   No notes on file          Scheduled Meds:   allopurinoL  50 mg Oral Every other day    apixaban  5 mg Oral BID    atorvastatin  80 mg Oral Daily    carvediloL  6.25 mg Oral BID    clopidogreL  75 mg Oral Daily    famotidine  20 mg Oral Daily    Lactobacillus rhamnosus GG  1 capsule Oral Daily    sertraline  100 mg Oral Daily    sevelamer carbonate  800 mg Oral TID WM    traZODone  25 mg Oral QHS     Continuous Infusions:  PRN Meds:  Current Facility-Administered Medications:     dextrose 10%, 12.5 g, Intravenous, PRN    dextrose 10%, 25 g, Intravenous, PRN    glucagon (human recombinant), 1 mg,  Intramuscular, PRN    heparin (porcine), 1,000 Units, Intravenous, PRN    hydrALAZINE, 25 mg, Oral, Q8H PRN    insulin aspart U-100, 0-10 Units, Subcutaneous, Q6H PRN    melatonin, 6 mg, Oral, Nightly PRN    oxyCODONE, 5 mg, Oral, Q6H PRN    QUEtiapine, 25 mg, Oral, Daily PRN    sodium chloride 0.9%, 10 mL, Intravenous, PRN    Review of Systems   Skin:  Positive for wound.     Objective:     Vital Signs (Most Recent):  Temp: 97.7 °F (36.5 °C) (05/20/24 1156)  Pulse: 78 (05/20/24 1156)  Resp: 17 (05/20/24 1156)  BP: (!) 149/79 (05/20/24 1156)  SpO2: 95 % (05/20/24 0535) Vital Signs (24h Range):  Temp:  [97.6 °F (36.4 °C)-98.3 °F (36.8 °C)] 97.7 °F (36.5 °C)  Pulse:  [73-81] 78  Resp:  [15-20] 17  SpO2:  [94 %-100 %] 95 %  BP: (135-155)/(70-90) 149/79     Weight: 69.1 kg (152 lb 5.4 oz)  Body mass index is 46.49 kg/m².     Physical Exam  Constitutional:       Appearance: Normal appearance.   Skin:     General: Skin is warm and dry.      Findings: Lesion present.   Neurological:      Mental Status: She is alert.          Laboratory:  All pertinent labs reviewed within the last 24 hours.    Diagnostic Results:  None

## 2024-05-20 NOTE — PT/OT/SLP PROGRESS
Physical Therapy Treatment  Co Treatment with Occupational Therapy    Patient Name:  Suyapa Connelly   MRN:  [id number]    Recommendations:     Discharge Recommendations: Low Intensity Therapy  Discharge Equipment Recommendations: bedside commode (drop arm commode)  Barriers to discharge: None    Assessment:     Suyapa Connelly is a 58 y.o. female admitted with a medical diagnosis of Severe sepsis.  She presents with the following impairments/functional limitations: weakness, impaired endurance, impaired self care skills, impaired functional mobility, gait instability, impaired balance, decreased lower extremity function, decreased safety awareness, impaired skin.    Pt agreeable to session at this time. Pt requires significant assistance x2 persons for bed mobility and sitting balance. Pt demonstrating improvements with sitting balance this session compared to previous session. Pt will continue to benefit from skilled therapy services.     Rehab Prognosis: Fair; patient would benefit from acute skilled PT services to address these deficits and reach maximum level of function.    Recent Surgery: * No surgery found *      Plan:     During this hospitalization, patient to be seen 3 x/week to address the identified rehab impairments via therapeutic activities, therapeutic exercises, neuromuscular re-education, wheelchair management/training and progress toward the following goals:    Plan of Care Expires:  06/08/24    Subjective     Chief Complaint: Pt c/o of pain on underside of legs with movement  Patient/Family Comments/goals: agreeable to sit EOB   Pain/Comfort:  Pain Rating 1: other (see comments) (unrated pain)  Location - Orientation 1: generalized  Location 1: leg (underside of legs)  Pain Addressed 1: Reposition, Distraction  Pain Rating Post-Intervention 1:  (pain not rated)      Objective:     Communicated with RN ([place]) prior to session.  Patient found HOB elevated with telemetry  upon PTA entry to room.      General Precautions: Standard, aspiration, fall  Orthopedic Precautions: N/A (B BKA)  Braces: N/A  Respiratory Status: Room air     Functional Mobility:  Bed Mobility:     Rolling Left:  max  assistance  Rolling Right: max  assistance  Scooting:anteriorly to EOB  total assistance x2 persons via jenise pads  Supine to Sit: total assistance x2 persons for Trunk/hips/B residual limbs  Sit to Supine: total assistance x2 persons  for Trunk/hips/B residual limbs  Balance:   Static Sitting:Pt sits at EOB ~20 minutes Min A progressing to CGA with good balance.  Dynamic Balance: Pt with R lateral lean during dynamic LUE activity with OT requiring Min A to correct. Decreased R lateral lean with dynamic RUE activity with OT only requiring CGA for upright posture.       AM-PAC 6 CLICK MOBILITY  Turning over in bed (including adjusting bedclothes, sheets and blankets)?: 2  Sitting down on and standing up from a chair with arms (e.g., wheelchair, bedside commode, etc.): 1  Moving from lying on back to sitting on the side of the bed?: 2  Moving to and from a bed to a chair (including a wheelchair)?: 2  Need to walk in hospital room?: 1  Climbing 3-5 steps with a railing?: 1  Basic Mobility Total Score: 9       Treatment & Education:  Patient provided with daily orientation and goals of this PT session.     Pt shifts weight anteriorly x5 utilizing RUE to pull trunk anteriorly with therapist's hand.     Pt educated to call for assistance and to transfer with hospital staff only.  Also, pt was educated on the effects of prolonged immobility and the importance of performing OOB activity and exercises to promote healing and reduce recovery time.    Co tx performed with OT due to patient need for 2 skilled therapist to ensure patient and staff safety and to accommondate for activity tolerance.    Patient left HOB elevated with all lines intact, call button in reach, and RN notified.    GOALS:   Multidisciplinary Problems        Physical Therapy Goals          Problem: Physical Therapy    Goal Priority Disciplines Outcome Goal Variances Interventions   Physical Therapy Goal     PT, PT/OT Progressing     Description: Goals to be met by: 2024    Patient will increase functional independence with mobility by performin. Supine to sit with Stand-by Assistance  2. Sit to supine with Stand-by Assistance  3. Rolling to Left and Right with Stand-by Assistance  4. Lateral Scooting to Left and Right with Stand-by Assistance  5. Pt to be able to sit on the EOB for 10 min with no assist while performing activities with her UEs. -not met                         Time Tracking:     PT Received On: 24  PT Start Time: 1307     PT Stop Time: 1332  PT Total Time (min): 25 min     Billable Minutes: Therapeutic Exercise 12 and Neuromuscular Re-education 13    Treatment Type: Treatment        Number of PTA visits since last PT visit: 2024

## 2024-05-20 NOTE — PLAN OF CARE
Pt being discharged to home via stretcher. Awaiting transport with guzman. IV removed. Discharge packet discussed with pt and family. All questions answered.

## 2024-05-20 NOTE — DISCHARGE SUMMARY
Discharge Summary  Hospital Medicine    Attending Provider on Discharge: Olga Washington MD  [facility] Medicine Team: [facility] HOSP MED W  Date of Admission:  5/7/2024     Date of Discharge:  05/20/2024  Code status: Full Code    Active Hospital Problems    Diagnosis  POA    *Severe sepsis [A41.9, R65.20]  Yes    Thrombocytopenia [D69.6]  No     , trend daily      Severe obesity (BMI >= 40) [E66.01]  Yes     BMI 45      Irritant contact dermatitis due to friction or contact with body fluids, unspecified [L24.A0]  Yes    Pressure injury of sacral region, unstageable [L89.150]  Yes    ESRD (end stage renal disease) [N18.6]  Yes    Hyperkalemia [E87.5]  Yes     K 5.7, ESRD patient, improved with HD      Chronic combined systolic and diastolic heart failure [I50.42]  Yes    Encephalopathy [G93.40]  Yes    Impaired functional mobility and endurance [Z74.09]  Yes    Paroxysmal atrial fibrillation [I48.0]  Yes    Anemia due to chronic kidney disease, on chronic dialysis [N18.6, D63.1, Z99.2]  Not Applicable    Type 2 diabetes mellitus with hyperglycemia, with long-term current use of insulin [E11.65, Z79.4]  Not Applicable    CAD (coronary artery disease) [I25.10]  Yes     Formatting of this note might be different from the original.  Last Assessment & Plan:   Formatting of this note might be different from the original.  -- Optimize glucose control.      CAROLIN (generalized anxiety disorder) [F41.1]  Yes    Peripheral vascular disease [I73.9]  Yes      Resolved Hospital Problems    Diagnosis Date Resolved POA    On mechanically assisted ventilation [Z99.11] 05/10/2024 Not Applicable    Moderate malnutrition [E44.0] 05/10/2024 No     HPI  Ms. Suyapa Connelly is a 59 y/o F with hx of severe PVD ultimately requiring bilateral LE AKAs, ESRD on HD, IDDM2, CAD s/p CABG, Afib on eliquis, debility who initially presented to the ED via EMS after an episode of unresponsiveness at home. Patient was apparently at her mental baseline this  morning, when she suddenly became unresponsive per . EMS was called and were unable to arouse patient. She was brought into the ED and intubated shortly after arrival. Of note, patient has had 6 different hospitalizations over the past year related to encephalopathy. Patient was most recently admitted 3/22-3/23/24 for evaluation of steady cognitive decline over the past year. It appears patient had declined over the past year to the extent that she is unable to perform independent ADLs.  not ready for NH placement as of yet.     In the ED, patient afebrile, SBP 120s, HR 80s, intubated at 40% FiO2 Peep 5. CBC grossly unchanged from baseline, no leukocytosis or worsening anemia. CMP without significant electrolyte abnormalities, BUN 59, BGL 130s. Lactic wnl. UA c/f infection with >100 wbc and many bacteria. UDS pan-negative. pH wnl and no hypercarbia on VBG. Patient admitted to MICU for continued management.     Hospital Course  * Severe sepsis  E coli UTI  - ID consulted   - Finished 7 day course of ceftriaxone     ESRD (end stage renal disease)  HD per nephro; appreciate assistance     Chronic combined systolic and diastolic heart failure  - Continue Coreg  - HD for volume status     Acute metabolic Encephalopathy  - Vascular neurology consulted. Appreciate assistance   - CTH, MRI brain negative, Tox negative  - Delirium precautions   - Replace electrolytes to keep Mg > 2, Phos > 3, and K > 4  - Nephrology HD MWF, dialyzed 5/8/24    Appears to be functionally declining slowly. Unclear true baseline. Suspect underlying unspecified dementia  Vascular neurology signed off.   Possibly acute exacerbation with UTI/sepsis.  Improving daily, but slowly     Impaired functional mobility and endurance  - PT/OT consulted  - Insurance denies SNF benefit  - agreeable to transition to home health with hopes of getting PT through part B  - Suyapa Connelly has a mobility limitation that significantly impairs her  ability to participate in one or more mobility related activities of daily living (MRADL's) such as toileting, feeding, dressing, grooming, and bathing in customary locations in the home. The mobility limitation cannot be sufficiently resolved by the use of a cane or walker. The use of a manual wheelchair will significantly improve the patient's ability to participate in MRADLS and the patient will use it on regular basis in the home. Suyapa Connelly has expressed her willingness to use a manual wheelchair in the home. She also has a caregiver who is available, willing, and able to provide assistance with the wheelchair when needed.     Paroxysmal atrial fibrillation  - Continue coreg  - Continue Eliquis     Anemia due to chronic kidney disease, on chronic dialysis  - At Baseline  - Epo per nephro     CAD (coronary artery disease)  - Continue home plavix     Type 2 diabetes mellitus with hyperglycemia, with long-term current use of insulin  - Inpatient glucose goal 140-180  - Most Recent HbA1c: 8.0 in 4/2024   - MDSSI   - Glucose checks POCT qACHS ordered     CAROLIN (generalized anxiety disorder)  - Continue home sertraline and trazodone     Peripheral vascular disease  - Continue plavix     Procedures: none    Consultants: critical care, infectious disease, nephrology, vascular neurology    Current Discharge Medication List        CONTINUE these medications which have CHANGED    Details   insulin aspart, niacinamide, (FIASP FLEXTOUCH U-100 INSULIN) 100 unit/mL (3 mL) InPn Inject 3 Units into the skin 3 (three) times daily with meals.           CONTINUE these medications which have NOT CHANGED    Details   acetaminophen (TYLENOL) 500 MG tablet Take 500 mg by mouth every 6 (six) hours as needed for Pain.      allopurinoL (ZYLOPRIM) 100 MG tablet Take 0.5 tablets (50 mg total) by mouth every other day.  Qty: 8 tablet, Refills: 11      apixaban (ELIQUIS) 5 mg Tab Take 1 tablet (5 mg total) by mouth 2 (two) times  "daily.  Qty: 60 tablet, Refills: 11      atorvastatin (LIPITOR) 80 MG tablet Take 1 tablet (80 mg total) by mouth once daily.  Qty: 90 tablet, Refills: 3    Associated Diagnoses: Coronary artery disease, unspecified vessel or lesion type, unspecified whether angina present, unspecified whether native or transplanted heart      blood sugar diagnostic Strp Use to check blood glucose 2 (two) times a day.  Qty: 100 strip, Refills: 0      calcitRIOL (ROCALTROL) 0.5 MCG Cap Take 1 capsule (0.5 mcg total) by mouth once daily.  Qty: 90 capsule, Refills: 3      calcium acetate,phosphat bind, (PHOSLO) 667 mg capsule Take 1 capsule (667 mg total) by mouth 3 (three) times daily with meals.  Qty: 90 capsule, Refills: 11      carvediloL (COREG) 6.25 MG tablet Take 1 tablet (6.25 mg total) by mouth 2 (two) times daily.  Qty: 180 tablet, Refills: 3    Comments: .      clopidogreL (PLAVIX) 75 mg tablet Take 1 tablet (75 mg total) by mouth once daily.  Qty: 90 tablet, Refills: 3    Associated Diagnoses: S/P CABG x 1      famotidine (PEPCID) 20 MG tablet Take 1 tablet (20 mg total) by mouth 2 (two) times daily.  Qty: 180 tablet, Refills: 3      hydrALAZINE (APRESOLINE) 50 MG tablet Take 1 tablet (50 mg total) by mouth 3 (three) times daily.  Qty: 270 tablet, Refills: 3    Comments: .      lancets Misc To check BG 3 times daily, to use with insurance preferred meter  Qty: 100 each, Refills: 3      multivitamin (ONE DAILY MULTIVITAMIN) per tablet Take 1 tablet by mouth once daily.      pen needle, diabetic 32 gauge x 5/32" Ndle 1 Units by Misc.(Non-Drug; Combo Route) route before meals as needed (SSI).  Qty: 50 each, Refills: 3      sertraline (ZOLOFT) 100 MG tablet Take 1 tablet (100 mg total) by mouth once daily.  Qty: 90 tablet, Refills: 3    Associated Diagnoses: CAROLIN (generalized anxiety disorder)      sevelamer carbonate (RENVELA) 800 mg Tab Take 2 tablets (1,600 mg total) by mouth 3 (three) times daily.  Qty: 270 tablet, Refills: " 3      sodium bicarbonate 650 MG tablet TAKE 1 TABLET(650 MG) BY MOUTH THREE TIMES DAILY  Qty: 90 tablet, Refills: 0      traZODone (DESYREL) 50 MG tablet Take 0.5 tablets (25 mg total) by mouth every evening.  Qty: 15 tablet, Refills: 11           STOP taking these medications       blood-glucose meter Misc Comments:   Reason for Stopping:         blood-glucose meter Misc Comments:   Reason for Stopping:         furosemide (LASIX) 40 MG tablet Comments:   Reason for Stopping:         insulin (LANTUS SOLOSTAR U-100 INSULIN) glargine 100 units/mL SubQ pen Comments:   Reason for Stopping:         isosorbide dinitrate (ISORDIL) 20 MG tablet Comments:   Reason for Stopping:         pregabalin (LYRICA) 100 MG capsule Comments:   Reason for Stopping:               Discharge Diet:renal diet     Activity: activity as tolerated    Discharge Condition: Fair    Disposition: Home-Health Care Svc    Tests pending at the time of discharge: none      Time spent  on the discharge of the patient including review of hospital course with the patient. reviewing discharge medications and arranging follow-up care \35 min    Discharge examination Patient was seen and examined on the date of discharge and determined to be suitable for discharge.    Discharge plan     Future Appointments   Date Time Provider Department Center   5/29/2024 11:00 AM Haydee Moore MD [facility]   6/7/2024  2:30 PM Geeta Cohen APRN,ANP-C [facility] ENDOCRN Jonh Washington MD

## 2024-05-20 NOTE — PLAN OF CARE
Called and spoke with Preethi [phone] at [facility] and informed her that discharge order has been placed and patient will discharge to home and admit to nursing home from home, so patient will remain at [facility] [address].           Palma Do, RNCM  Case Management  [phone]

## 2024-05-20 NOTE — PLAN OF CARE
CM has made many attempts to contact patient's spouse with no avail.         Palma Do RNCM  Case Management  [phone]

## 2024-05-20 NOTE — PROGRESS NOTES
[facility] - Med Surg  Skin Integrity AVA  Progress Note    Patient Name: Suyapa Connelly  MRN: [id number]  Admission Date: 5/7/2024  Hospital Length of Stay: 13 days  Attending Physician: Olga Washington MD  Primary Care Provider: Magen Christensen MD         Subjective:     HPI:  Suyapa Connelly is a 58 year old female with hx of severe PVD ultimately requiring bilateral LE AKAs, ESRD on HD, IDDM2, CAD s/p CABG, Afib on eliquis, debility who initially presented to the ED via EMS after an episode of unresponsiveness at home. Patient was apparently at her mental baseline this morning, when she suddenly became unresponsive per . EMS was called and were unable to arouse patient. She was brought into the ED and intubated shortly after arrival. Of note, patient has had 6 different hospitalizations over the past year related to encephalopathy. Patient was most recently admitted 3/22-3/23/24 for evaluation of steady cognitive decline over the past year. It appears patient had declined over the past year to the extent that she is unable to perform independent ADLs.  not ready for NH placement as of yet.     In the ED, patient afebrile, SBP 120s, HR 80s, intubated at 40% FiO2 Peep 5. CBC grossly unchanged from baseline, no leukocytosis or worsening anemia. CMP without significant electrolyte abnormalities, BUN 59, BGL 130s. Lactic wnl. UA c/f infection with >100 wbc and many bacteria. UDS pan-negative. pH wnl and no hypercarbia on VBG. Patient admitted to MICU for continued management. Skin integrity AVA consulted for evaluation of skin injury.    Hospital Course:   No notes on file          Scheduled Meds:   allopurinoL  50 mg Oral Every other day    apixaban  5 mg Oral BID    atorvastatin  80 mg Oral Daily    carvediloL  6.25 mg Oral BID    clopidogreL  75 mg Oral Daily    famotidine  20 mg Oral Daily    Lactobacillus rhamnosus GG  1 capsule Oral Daily    sertraline  100 mg Oral Daily    sevelamer carbonate   800 mg Oral TID WM    traZODone  25 mg Oral QHS     Continuous Infusions:  PRN Meds:  Current Facility-Administered Medications:     dextrose 10%, 12.5 g, Intravenous, PRN    dextrose 10%, 25 g, Intravenous, PRN    glucagon (human recombinant), 1 mg, Intramuscular, PRN    heparin (porcine), 1,000 Units, Intravenous, PRN    hydrALAZINE, 25 mg, Oral, Q8H PRN    insulin aspart U-100, 0-10 Units, Subcutaneous, Q6H PRN    melatonin, 6 mg, Oral, Nightly PRN    oxyCODONE, 5 mg, Oral, Q6H PRN    QUEtiapine, 25 mg, Oral, Daily PRN    sodium chloride 0.9%, 10 mL, Intravenous, PRN    Review of Systems   Skin:  Positive for wound.     Objective:     Vital Signs (Most Recent):  Temp: 97.7 °F (36.5 °C) (05/20/24 1156)  Pulse: 78 (05/20/24 1156)  Resp: 17 (05/20/24 1156)  BP: (!) 149/79 (05/20/24 1156)  SpO2: 95 % (05/20/24 0535) Vital Signs (24h Range):  Temp:  [97.6 °F (36.4 °C)-98.3 °F (36.8 °C)] 97.7 °F (36.5 °C)  Pulse:  [73-81] 78  Resp:  [15-20] 17  SpO2:  [94 %-100 %] 95 %  BP: (135-155)/(70-90) 149/79     Weight: 69.1 kg (152 lb 5.4 oz)  Body mass index is 46.49 kg/m².     Physical Exam  Constitutional:       Appearance: Normal appearance.   Skin:     General: Skin is warm and dry.      Findings: Lesion present.   Neurological:      Mental Status: She is alert.          Laboratory:  All pertinent labs reviewed within the last 24 hours.    Diagnostic Results:  None    Assessment/Plan:         AVA Skin Integrity Evaluation      Skin Integrity AVA evaluation of patient as part of the comprehensive skin care team.     She has been admitted for 13 days. Skin injury was noted on 5/8/24. POA yes.    L buttocks      L sacral region          Derm  Irritant contact dermatitis due to friction or contact with body fluids, unspecified  - follow up evaluation of skin injury.  - pt presented to the ED via EMS after an episode of unresponsiveness at home.  - scattered partial thickness tissue loss and ruptured blister with pink, red,  moist wound base to sacrum and left lower buttocks.  - likely related to friction and moisture.  - continue Triad bid/prn. No foam dressings. Sacral foams again removed.  - Immerse surface.  - wedge for offloading.  - turn q2h.  - leodan score documented at 14 with a nutrition sub scale score of 3.  - nursing to maintain pressure injury prevention measures and continue wound care per orders.    Orthopedic  Pressure injury of sacral region, unstageable  - left upper sacral region wound with black eschar to wound base.  - unstageable pressure injury of sacrum.  - continue Triad bid/prn.  - No foam dressings over wound.        Magda Tillman, NP  Skin Integrity AVA  [facility] - Med Surg

## 2024-05-21 NOTE — PLAN OF CARE
[facility] - Med Surg  Discharge Final Note    Primary Care Provider: Magen Christensen MD    Expected Discharge Date: 5/20/2024      Patient discharged to home with [facility] home health. SOC 5/23/2024. Discharge Plan A and Plan B have been determined by review of patient's clinical status, future medical and therapeutic needs, and coverage/benefits for post-acute care in coordination with multidisciplinary team members.  Final Discharge Note (most recent)       Final Note - 05/21/24 1308          Final Note    Assessment Type Final Discharge Note (P)      Anticipated Discharge Disposition Home-Health Care Svc (P)      Hospital Resources/Appts/Education Provided Appointments scheduled and added to AVS (P)         Post-Acute Status    Post-Acute Authorization Home Health (P)      Home Health Status Set-up Complete/Auth obtained (P)      Coverage [employer] (P)      Discharge Delays None known at this time (P)                      Important Message from Medicare  Important Message from Medicare regarding Discharge Appeal Rights: Explained to patient/caregiver, Given to patient/caregiver, Signed/date by patient/caregiver     Date IMM was signed: 05/20/24  Time IMM was signed: 7440    Contact Info       Magen Christensen MD   Specialty: Family Medicine   Relationship: PCP - General    [address] [facility] [address]   Phone: [phone]       Next Steps: Follow up            [facility]  Case Management  [phone]

## 2024-05-21 NOTE — NURSING
Pt was discharge  to home per [facility] transportation.Pt   was called to return to room and was present at the time of c\discharge

## 2024-05-21 NOTE — PLAN OF CARE
05/21/24 0857   Post-Acute Status   Post-Acute Authorization Home Health   Home Health Status Referrals Sent   Coverage [employer]   Discharge Delays None known at this time   Discharge Plan   Discharge Plan A Home Health   Discharge Plan B Home Health     Spoke with patient's spouse Randell to review discharge recommendation of home health and he is agreeable to plan    Patient/family provided list of facilities in-network with patient's payor plan. Providers that are owned, operated, or affiliated with [facility] are included on the list.     Notified that referrals will be sent to facilities from in-network list based on proximity to home/family support:       Patient/family instructed to identify preference.    Preferred Facility: (if more than 1, listed in order of descending preference)  No preference    If an additional preferred facility not listed above is identified, additional referral to be sent. If above facilities unable to accept, will send additional referrals to in-network providers.   Discharge Plan A and Plan B have been determined by review of patient's clinical status, future medical and therapeutic needs, and coverage/benefits for post-acute care in coordination with multidisciplinary team members.      Palma Do, DEANGELOCM  Case Management  [phone]

## 2024-05-22 LAB — POCT GLUCOSE: 143 MG/DL (ref 70–110)

## 2024-05-23 ENCOUNTER — PATIENT OUTREACH (OUTPATIENT)
Dept: ADMINISTRATIVE | Facility: CLINIC | Age: 59
End: 2024-05-23
Payer: MEDICARE

## 2024-05-23 ENCOUNTER — TELEPHONE (OUTPATIENT)
Dept: PRIMARY CARE CLINIC | Facility: CLINIC | Age: 59
End: 2024-05-23
Payer: MEDICARE

## 2024-05-23 NOTE — TELEPHONE ENCOUNTER
----- Message from Marissa Cotto sent at 5/23/2024 12:41 PM CDT -----  Type: Needs Medical Advice  Who Called:  pt  (basilio)  Best Call Back Number: [phone]  Additional Information: Pt  is calling the office pt has new diagnosis for cerebral disease not getting blood to brain properly needs to speak with the nurse why pt has been missing appt and getting pt with proper meds as needed.Please call back and advise.

## 2024-05-23 NOTE — PROGRESS NOTES
C3 nurse spoke with Suyapa Connelly ( ) for a TCC post hospital discharge follow up call. The patient has a scheduled [facility] [place] appointment with GERMAN Moore on 05/29/2024 @ 1100.    Message sen to PCP staff

## 2024-05-28 ENCOUNTER — HOSPITAL ENCOUNTER (OUTPATIENT)
Facility: HOSPITAL | Age: 59
Discharge: HOME | End: 2024-06-03
Attending: STUDENT IN AN ORGANIZED HEALTH CARE EDUCATION/TRAINING PROGRAM | Admitting: STUDENT IN AN ORGANIZED HEALTH CARE EDUCATION/TRAINING PROGRAM
Payer: MEDICARE

## 2024-05-28 DIAGNOSIS — R29.818 ACUTE FOCAL NEUROLOGICAL DEFICIT: ICD-10-CM

## 2024-05-28 DIAGNOSIS — R07.9 CHEST PAIN: ICD-10-CM

## 2024-05-28 DIAGNOSIS — G93.40 ENCEPHALOPATHY: Primary | ICD-10-CM

## 2024-05-28 DIAGNOSIS — G93.40 ACUTE ENCEPHALOPATHY: ICD-10-CM

## 2024-05-28 DIAGNOSIS — Z75.8 DISCHARGE PLANNING ISSUES: ICD-10-CM

## 2024-05-28 DIAGNOSIS — R41.82 ALTERED MENTAL STATUS: ICD-10-CM

## 2024-05-28 PROBLEM — R41.0 CONFUSION: Status: ACTIVE | Noted: 2024-05-28

## 2024-05-28 LAB
ALBUMIN SERPL BCP-MCNC: 2.9 G/DL (ref 3.5–5.2)
ALP SERPL-CCNC: 221 U/L (ref 55–135)
ALT SERPL W/O P-5'-P-CCNC: 17 U/L (ref 10–44)
ANION GAP SERPL CALC-SCNC: 15 MMOL/L (ref 8–16)
AST SERPL-CCNC: 35 U/L (ref 10–40)
BASOPHILS # BLD AUTO: 0.08 K/UL (ref 0–0.2)
BASOPHILS NFR BLD: 1.5 % (ref 0–1.9)
BILIRUB SERPL-MCNC: 1.1 MG/DL (ref 0.1–1)
BUN SERPL-MCNC: 74 MG/DL (ref 6–20)
CALCIUM SERPL-MCNC: 8.8 MG/DL (ref 8.7–10.5)
CHLORIDE SERPL-SCNC: 102 MMOL/L (ref 95–110)
CHOLEST SERPL-MCNC: 157 MG/DL (ref 120–199)
CHOLEST/HDLC SERPL: 3.3 {RATIO} (ref 2–5)
CO2 SERPL-SCNC: 20 MMOL/L (ref 23–29)
CREAT SERPL-MCNC: 5.8 MG/DL (ref 0.5–1.4)
CREAT SERPL-MCNC: 6.3 MG/DL (ref 0.5–1.4)
DIFFERENTIAL METHOD BLD: ABNORMAL
EOSINOPHIL # BLD AUTO: 0.2 K/UL (ref 0–0.5)
EOSINOPHIL NFR BLD: 4.2 % (ref 0–8)
ERYTHROCYTE [DISTWIDTH] IN BLOOD BY AUTOMATED COUNT: 18.1 % (ref 11.5–14.5)
EST. GFR  (NO RACE VARIABLE): 8 ML/MIN/1.73 M^2
GLUCOSE SERPL-MCNC: 264 MG/DL (ref 70–110)
HCT VFR BLD AUTO: 33.6 % (ref 37–48.5)
HDLC SERPL-MCNC: 48 MG/DL (ref 40–75)
HDLC SERPL: 30.6 % (ref 20–50)
HGB BLD-MCNC: 11.3 G/DL (ref 12–16)
IMM GRANULOCYTES # BLD AUTO: 0.01 K/UL (ref 0–0.04)
IMM GRANULOCYTES NFR BLD AUTO: 0.2 % (ref 0–0.5)
INR PPP: 1.2 (ref 0.8–1.2)
LDLC SERPL CALC-MCNC: 82.4 MG/DL (ref 63–159)
LYMPHOCYTES # BLD AUTO: 1 K/UL (ref 1–4.8)
LYMPHOCYTES NFR BLD: 18.2 % (ref 18–48)
MCH RBC QN AUTO: 32.6 PG (ref 27–31)
MCHC RBC AUTO-ENTMCNC: 33.6 G/DL (ref 32–36)
MCV RBC AUTO: 97 FL (ref 82–98)
MONOCYTES # BLD AUTO: 0.8 K/UL (ref 0.3–1)
MONOCYTES NFR BLD: 15.1 % (ref 4–15)
NEUTROPHILS # BLD AUTO: 3.3 K/UL (ref 1.8–7.7)
NEUTROPHILS NFR BLD: 60.8 % (ref 38–73)
NONHDLC SERPL-MCNC: 109 MG/DL
NRBC BLD-RTO: 0 /100 WBC
PLATELET # BLD AUTO: 205 K/UL (ref 150–450)
PMV BLD AUTO: 11.6 FL (ref 9.2–12.9)
POC PTINR: 1.4 (ref 0.9–1.2)
POC PTWBT: 17 SEC (ref 9.7–14.3)
POCT GLUCOSE: 284 MG/DL (ref 70–110)
POTASSIUM SERPL-SCNC: 5.2 MMOL/L (ref 3.5–5.1)
PROT SERPL-MCNC: 8.8 G/DL (ref 6–8.4)
PROTHROMBIN TIME: 13.2 SEC (ref 9–12.5)
RBC # BLD AUTO: 3.47 M/UL (ref 4–5.4)
SAMPLE: ABNORMAL
SAMPLE: ABNORMAL
SODIUM SERPL-SCNC: 137 MMOL/L (ref 136–145)
TRIGL SERPL-MCNC: 133 MG/DL (ref 30–150)
TSH SERPL DL<=0.005 MIU/L-ACNC: 3.13 UIU/ML (ref 0.4–4)
WBC # BLD AUTO: 5.49 K/UL (ref 3.9–12.7)

## 2024-05-28 PROCEDURE — 99285 EMERGENCY DEPT VISIT HI MDM: CPT | Mod: 25,HCNC

## 2024-05-28 PROCEDURE — 99900035 HC TECH TIME PER 15 MIN (STAT): Mod: HCNC

## 2024-05-28 PROCEDURE — 82962 GLUCOSE BLOOD TEST: CPT | Mod: HCNC

## 2024-05-28 PROCEDURE — 80053 COMPREHEN METABOLIC PANEL: CPT | Mod: HCNC | Performed by: STUDENT IN AN ORGANIZED HEALTH CARE EDUCATION/TRAINING PROGRAM

## 2024-05-28 PROCEDURE — G0378 HOSPITAL OBSERVATION PER HR: HCPCS | Mod: HCNC

## 2024-05-28 PROCEDURE — 80061 LIPID PANEL: CPT | Mod: HCNC | Performed by: STUDENT IN AN ORGANIZED HEALTH CARE EDUCATION/TRAINING PROGRAM

## 2024-05-28 PROCEDURE — 84443 ASSAY THYROID STIM HORMONE: CPT | Mod: HCNC | Performed by: STUDENT IN AN ORGANIZED HEALTH CARE EDUCATION/TRAINING PROGRAM

## 2024-05-28 PROCEDURE — 25500020 PHARM REV CODE 255: Mod: HCNC | Performed by: STUDENT IN AN ORGANIZED HEALTH CARE EDUCATION/TRAINING PROGRAM

## 2024-05-28 PROCEDURE — 99214 OFFICE O/P EST MOD 30 MIN: CPT | Mod: FS,HCNC,, | Performed by: PSYCHIATRY & NEUROLOGY

## 2024-05-28 PROCEDURE — 85610 PROTHROMBIN TIME: CPT | Mod: HCNC | Performed by: STUDENT IN AN ORGANIZED HEALTH CARE EDUCATION/TRAINING PROGRAM

## 2024-05-28 PROCEDURE — 85610 PROTHROMBIN TIME: CPT | Mod: HCNC

## 2024-05-28 PROCEDURE — 93010 ELECTROCARDIOGRAM REPORT: CPT | Mod: HCNC,,, | Performed by: INTERNAL MEDICINE

## 2024-05-28 PROCEDURE — 82565 ASSAY OF CREATININE: CPT | Mod: HCNC

## 2024-05-28 PROCEDURE — 85025 COMPLETE CBC W/AUTO DIFF WBC: CPT | Mod: HCNC | Performed by: STUDENT IN AN ORGANIZED HEALTH CARE EDUCATION/TRAINING PROGRAM

## 2024-05-28 PROCEDURE — 93005 ELECTROCARDIOGRAM TRACING: CPT | Mod: HCNC

## 2024-05-28 RX ADMIN — IOHEXOL 75 ML: 350 INJECTION, SOLUTION INTRAVENOUS at 09:05

## 2024-05-28 NOTE — Clinical Note
Diagnosis: Altered mental status [780.97.ICD-9-CM]   Future Attending Provider: ISREAL COLMENARES [[id number]]

## 2024-05-29 PROBLEM — R29.818 ACUTE FOCAL NEUROLOGICAL DEFICIT: Status: ACTIVE | Noted: 2024-05-29

## 2024-05-29 LAB
ALBUMIN SERPL BCP-MCNC: 2.8 G/DL (ref 3.5–5.2)
ALP SERPL-CCNC: 217 U/L (ref 55–135)
ALT SERPL W/O P-5'-P-CCNC: 14 U/L (ref 10–44)
AMMONIA PLAS-SCNC: 43 UMOL/L (ref 10–50)
AMPHET+METHAMPHET UR QL: NEGATIVE
ANION GAP SERPL CALC-SCNC: 17 MMOL/L (ref 8–16)
AST SERPL-CCNC: 21 U/L (ref 10–40)
B-OH-BUTYR BLD STRIP-SCNC: 0.5 MMOL/L (ref 0–0.5)
BACTERIA #/AREA URNS AUTO: ABNORMAL /HPF
BARBITURATES UR QL SCN>200 NG/ML: NEGATIVE
BASOPHILS # BLD AUTO: 0.06 K/UL (ref 0–0.2)
BASOPHILS NFR BLD: 1.2 % (ref 0–1.9)
BENZODIAZ UR QL SCN>200 NG/ML: NEGATIVE
BILIRUB SERPL-MCNC: 1 MG/DL (ref 0.1–1)
BILIRUB UR QL STRIP: NEGATIVE
BUN SERPL-MCNC: 29 MG/DL (ref 6–20)
BUN SERPL-MCNC: 78 MG/DL (ref 6–20)
BUN SERPL-MCNC: 83 MG/DL (ref 6–20)
BZE UR QL SCN: NEGATIVE
CALCIUM SERPL-MCNC: 8.7 MG/DL (ref 8.7–10.5)
CANNABINOIDS UR QL SCN: NEGATIVE
CHLORIDE SERPL-SCNC: 103 MMOL/L (ref 95–110)
CLARITY UR REFRACT.AUTO: CLEAR
CO2 SERPL-SCNC: 21 MMOL/L (ref 23–29)
COLOR UR AUTO: YELLOW
CREAT SERPL-MCNC: 5.9 MG/DL (ref 0.5–1.4)
CREAT UR-MCNC: 91 MG/DL (ref 15–325)
DIFFERENTIAL METHOD BLD: ABNORMAL
EOSINOPHIL # BLD AUTO: 0.2 K/UL (ref 0–0.5)
EOSINOPHIL NFR BLD: 5 % (ref 0–8)
ERYTHROCYTE [DISTWIDTH] IN BLOOD BY AUTOMATED COUNT: 18.1 % (ref 11.5–14.5)
EST. GFR  (NO RACE VARIABLE): 7.8 ML/MIN/1.73 M^2
GLUCOSE SERPL-MCNC: 273 MG/DL (ref 70–110)
GLUCOSE UR QL STRIP: ABNORMAL
HCT VFR BLD AUTO: 34.4 % (ref 37–48.5)
HGB BLD-MCNC: 10.8 G/DL (ref 12–16)
HGB UR QL STRIP: ABNORMAL
HYALINE CASTS UR QL AUTO: 0 /LPF
IMM GRANULOCYTES # BLD AUTO: 0.01 K/UL (ref 0–0.04)
IMM GRANULOCYTES NFR BLD AUTO: 0.2 % (ref 0–0.5)
KETONES UR QL STRIP: NEGATIVE
LEUKOCYTE ESTERASE UR QL STRIP: ABNORMAL
LYMPHOCYTES # BLD AUTO: 0.6 K/UL (ref 1–4.8)
LYMPHOCYTES NFR BLD: 13.3 % (ref 18–48)
MAGNESIUM SERPL-MCNC: 2.2 MG/DL (ref 1.6–2.6)
MCH RBC QN AUTO: 31.2 PG (ref 27–31)
MCHC RBC AUTO-ENTMCNC: 31.4 G/DL (ref 32–36)
MCV RBC AUTO: 99 FL (ref 82–98)
METHADONE UR QL SCN>300 NG/ML: NEGATIVE
MICROSCOPIC COMMENT: ABNORMAL
MONOCYTES # BLD AUTO: 0.8 K/UL (ref 0.3–1)
MONOCYTES NFR BLD: 15.5 % (ref 4–15)
NEUTROPHILS # BLD AUTO: 3.1 K/UL (ref 1.8–7.7)
NEUTROPHILS NFR BLD: 64.8 % (ref 38–73)
NITRITE UR QL STRIP: NEGATIVE
NRBC BLD-RTO: 0 /100 WBC
OHS QRS DURATION: 82 MS
OHS QTC CALCULATION: 454 MS
OPIATES UR QL SCN: NEGATIVE
PCP UR QL SCN>25 NG/ML: NEGATIVE
PH UR STRIP: 7 [PH] (ref 5–8)
PHOSPHATE SERPL-MCNC: 7.1 MG/DL (ref 2.7–4.5)
PLATELET # BLD AUTO: 184 K/UL (ref 150–450)
PMV BLD AUTO: 12 FL (ref 9.2–12.9)
POCT GLUCOSE: 140 MG/DL (ref 70–110)
POCT GLUCOSE: 264 MG/DL (ref 70–110)
POCT GLUCOSE: 280 MG/DL (ref 70–110)
POCT GLUCOSE: 297 MG/DL (ref 70–110)
POCT GLUCOSE: 319 MG/DL (ref 70–110)
POTASSIUM SERPL-SCNC: 4.9 MMOL/L (ref 3.5–5.1)
PROT SERPL-MCNC: 8.4 G/DL (ref 6–8.4)
PROT UR QL STRIP: ABNORMAL
RBC # BLD AUTO: 3.46 M/UL (ref 4–5.4)
RBC #/AREA URNS AUTO: 10 /HPF (ref 0–4)
SODIUM SERPL-SCNC: 141 MMOL/L (ref 136–145)
SP GR UR STRIP: 1.02 (ref 1–1.03)
SQUAMOUS #/AREA URNS AUTO: 3 /HPF
TOXICOLOGY INFORMATION: NORMAL
URN SPEC COLLECT METH UR: ABNORMAL
WBC # BLD AUTO: 4.83 K/UL (ref 3.9–12.7)
WBC #/AREA URNS AUTO: 22 /HPF (ref 0–5)

## 2024-05-29 PROCEDURE — 63600175 PHARM REV CODE 636 W HCPCS: Mod: HCNC

## 2024-05-29 PROCEDURE — 25000003 PHARM REV CODE 250: Mod: HCNC | Performed by: NURSE PRACTITIONER

## 2024-05-29 PROCEDURE — 25000003 PHARM REV CODE 250: Mod: HCNC

## 2024-05-29 PROCEDURE — 84100 ASSAY OF PHOSPHORUS: CPT | Mod: HCNC

## 2024-05-29 PROCEDURE — 82010 KETONE BODYS QUAN: CPT | Mod: HCNC

## 2024-05-29 PROCEDURE — 80053 COMPREHEN METABOLIC PANEL: CPT | Mod: HCNC

## 2024-05-29 PROCEDURE — 99214 OFFICE O/P EST MOD 30 MIN: CPT | Mod: HCNC,,, | Performed by: NURSE PRACTITIONER

## 2024-05-29 PROCEDURE — 96372 THER/PROPH/DIAG INJ SC/IM: CPT | Mod: 59

## 2024-05-29 PROCEDURE — G0378 HOSPITAL OBSERVATION PER HR: HCPCS | Mod: HCNC

## 2024-05-29 PROCEDURE — G0257 UNSCHED DIALYSIS ESRD PT HOS: HCPCS | Mod: HCNC

## 2024-05-29 PROCEDURE — 95816 EEG AWAKE AND DROWSY: CPT | Mod: HCNC

## 2024-05-29 PROCEDURE — 95816 EEG AWAKE AND DROWSY: CPT | Mod: 26,HCNC,, | Performed by: PSYCHIATRY & NEUROLOGY

## 2024-05-29 PROCEDURE — 80307 DRUG TEST PRSMV CHEM ANLYZR: CPT | Mod: HCNC | Performed by: STUDENT IN AN ORGANIZED HEALTH CARE EDUCATION/TRAINING PROGRAM

## 2024-05-29 PROCEDURE — 84520 ASSAY OF UREA NITROGEN: CPT | Mod: HCNC | Performed by: HOSPITALIST

## 2024-05-29 PROCEDURE — 83735 ASSAY OF MAGNESIUM: CPT | Mod: HCNC

## 2024-05-29 PROCEDURE — 85025 COMPLETE CBC W/AUTO DIFF WBC: CPT | Mod: HCNC

## 2024-05-29 PROCEDURE — 36415 COLL VENOUS BLD VENIPUNCTURE: CPT | Mod: HCNC

## 2024-05-29 PROCEDURE — 63600175 PHARM REV CODE 636 W HCPCS: Mod: HCNC | Performed by: NURSE PRACTITIONER

## 2024-05-29 PROCEDURE — 87086 URINE CULTURE/COLONY COUNT: CPT | Mod: HCNC | Performed by: STUDENT IN AN ORGANIZED HEALTH CARE EDUCATION/TRAINING PROGRAM

## 2024-05-29 PROCEDURE — 82140 ASSAY OF AMMONIA: CPT | Mod: HCNC

## 2024-05-29 PROCEDURE — 82962 GLUCOSE BLOOD TEST: CPT | Mod: HCNC

## 2024-05-29 PROCEDURE — 97535 SELF CARE MNGMENT TRAINING: CPT | Mod: HCNC

## 2024-05-29 PROCEDURE — 81001 URINALYSIS AUTO W/SCOPE: CPT | Mod: HCNC,XB | Performed by: STUDENT IN AN ORGANIZED HEALTH CARE EDUCATION/TRAINING PROGRAM

## 2024-05-29 PROCEDURE — 92610 EVALUATE SWALLOWING FUNCTION: CPT | Mod: HCNC

## 2024-05-29 RX ORDER — HYDRALAZINE HYDROCHLORIDE 20 MG/ML
5 INJECTION INTRAMUSCULAR; INTRAVENOUS EVERY 6 HOURS PRN
Status: DISCONTINUED | OUTPATIENT
Start: 2024-05-29 | End: 2024-06-01

## 2024-05-29 RX ORDER — SODIUM CHLORIDE 9 MG/ML
INJECTION, SOLUTION INTRAVENOUS ONCE
Status: COMPLETED | OUTPATIENT
Start: 2024-05-29 | End: 2024-05-29

## 2024-05-29 RX ORDER — INSULIN ASPART 100 [IU]/ML
0-5 INJECTION, SOLUTION INTRAVENOUS; SUBCUTANEOUS
Status: DISCONTINUED | OUTPATIENT
Start: 2024-05-29 | End: 2024-06-03 | Stop reason: HOSPADM

## 2024-05-29 RX ORDER — TALC
6 POWDER (GRAM) TOPICAL NIGHTLY PRN
Status: DISCONTINUED | OUTPATIENT
Start: 2024-05-29 | End: 2024-06-03 | Stop reason: HOSPADM

## 2024-05-29 RX ORDER — SIMETHICONE 80 MG
1 TABLET,CHEWABLE ORAL 4 TIMES DAILY PRN
Status: DISCONTINUED | OUTPATIENT
Start: 2024-05-29 | End: 2024-06-03 | Stop reason: HOSPADM

## 2024-05-29 RX ORDER — HYDRALAZINE HYDROCHLORIDE 50 MG/1
50 TABLET, FILM COATED ORAL 3 TIMES DAILY
Status: DISCONTINUED | OUTPATIENT
Start: 2024-05-29 | End: 2024-06-03 | Stop reason: HOSPADM

## 2024-05-29 RX ORDER — CLOPIDOGREL BISULFATE 75 MG/1
75 TABLET ORAL DAILY
Status: DISCONTINUED | OUTPATIENT
Start: 2024-05-29 | End: 2024-06-03 | Stop reason: HOSPADM

## 2024-05-29 RX ORDER — ALUMINUM HYDROXIDE, MAGNESIUM HYDROXIDE, AND SIMETHICONE 1200; 120; 1200 MG/30ML; MG/30ML; MG/30ML
30 SUSPENSION ORAL 4 TIMES DAILY PRN
Status: DISCONTINUED | OUTPATIENT
Start: 2024-05-29 | End: 2024-06-03 | Stop reason: HOSPADM

## 2024-05-29 RX ORDER — NALOXONE HCL 0.4 MG/ML
0.02 VIAL (ML) INJECTION
Status: DISCONTINUED | OUTPATIENT
Start: 2024-05-29 | End: 2024-06-03 | Stop reason: HOSPADM

## 2024-05-29 RX ORDER — IBUPROFEN 200 MG
24 TABLET ORAL
Status: DISCONTINUED | OUTPATIENT
Start: 2024-05-29 | End: 2024-06-03 | Stop reason: HOSPADM

## 2024-05-29 RX ORDER — ONDANSETRON 8 MG/1
8 TABLET, ORALLY DISINTEGRATING ORAL EVERY 8 HOURS PRN
Status: DISCONTINUED | OUTPATIENT
Start: 2024-05-29 | End: 2024-06-03 | Stop reason: HOSPADM

## 2024-05-29 RX ORDER — FAMOTIDINE 20 MG/1
20 TABLET, FILM COATED ORAL DAILY
Status: DISCONTINUED | OUTPATIENT
Start: 2024-05-29 | End: 2024-06-03 | Stop reason: HOSPADM

## 2024-05-29 RX ORDER — SODIUM CHLORIDE 0.9 % (FLUSH) 0.9 %
5 SYRINGE (ML) INJECTION
Status: DISCONTINUED | OUTPATIENT
Start: 2024-05-29 | End: 2024-06-03 | Stop reason: HOSPADM

## 2024-05-29 RX ORDER — IBUPROFEN 200 MG
16 TABLET ORAL
Status: DISCONTINUED | OUTPATIENT
Start: 2024-05-29 | End: 2024-06-03 | Stop reason: HOSPADM

## 2024-05-29 RX ORDER — CARVEDILOL 6.25 MG/1
6.25 TABLET ORAL 2 TIMES DAILY
Status: DISCONTINUED | OUTPATIENT
Start: 2024-05-29 | End: 2024-06-03 | Stop reason: HOSPADM

## 2024-05-29 RX ORDER — CALCIUM ACETATE 667 MG/1
667 CAPSULE ORAL
Status: DISCONTINUED | OUTPATIENT
Start: 2024-05-29 | End: 2024-06-03 | Stop reason: HOSPADM

## 2024-05-29 RX ORDER — GLUCAGON 1 MG
1 KIT INJECTION
Status: DISCONTINUED | OUTPATIENT
Start: 2024-05-29 | End: 2024-06-03 | Stop reason: HOSPADM

## 2024-05-29 RX ORDER — BISACODYL 10 MG/1
10 SUPPOSITORY RECTAL DAILY PRN
Status: DISCONTINUED | OUTPATIENT
Start: 2024-05-29 | End: 2024-06-03 | Stop reason: HOSPADM

## 2024-05-29 RX ORDER — ATORVASTATIN CALCIUM 40 MG/1
80 TABLET, FILM COATED ORAL DAILY
Status: DISCONTINUED | OUTPATIENT
Start: 2024-05-29 | End: 2024-06-03 | Stop reason: HOSPADM

## 2024-05-29 RX ORDER — IPRATROPIUM BROMIDE AND ALBUTEROL SULFATE 2.5; .5 MG/3ML; MG/3ML
3 SOLUTION RESPIRATORY (INHALATION) EVERY 4 HOURS PRN
Status: DISCONTINUED | OUTPATIENT
Start: 2024-05-29 | End: 2024-06-03 | Stop reason: HOSPADM

## 2024-05-29 RX ORDER — SERTRALINE HYDROCHLORIDE 100 MG/1
100 TABLET, FILM COATED ORAL DAILY
Status: DISCONTINUED | OUTPATIENT
Start: 2024-05-29 | End: 2024-06-03 | Stop reason: HOSPADM

## 2024-05-29 RX ORDER — HEPARIN SODIUM 1000 [USP'U]/ML
1000 INJECTION, SOLUTION INTRAVENOUS; SUBCUTANEOUS
Status: DISCONTINUED | OUTPATIENT
Start: 2024-05-29 | End: 2024-06-03 | Stop reason: HOSPADM

## 2024-05-29 RX ORDER — POLYETHYLENE GLYCOL 3350 17 G/17G
17 POWDER, FOR SOLUTION ORAL 2 TIMES DAILY PRN
Status: DISCONTINUED | OUTPATIENT
Start: 2024-05-29 | End: 2024-06-03 | Stop reason: HOSPADM

## 2024-05-29 RX ORDER — CALCITRIOL 0.5 UG/1
0.5 CAPSULE ORAL DAILY
Status: DISCONTINUED | OUTPATIENT
Start: 2024-05-29 | End: 2024-06-03 | Stop reason: HOSPADM

## 2024-05-29 RX ORDER — PROCHLORPERAZINE EDISYLATE 5 MG/ML
5 INJECTION INTRAMUSCULAR; INTRAVENOUS EVERY 6 HOURS PRN
Status: DISCONTINUED | OUTPATIENT
Start: 2024-05-29 | End: 2024-06-03 | Stop reason: HOSPADM

## 2024-05-29 RX ADMIN — SODIUM CHLORIDE: 9 INJECTION, SOLUTION INTRAVENOUS at 02:05

## 2024-05-29 RX ADMIN — APIXABAN 5 MG: 5 TABLET, FILM COATED ORAL at 09:05

## 2024-05-29 RX ADMIN — HYDRALAZINE HYDROCHLORIDE 5 MG: 20 INJECTION, SOLUTION INTRAMUSCULAR; INTRAVENOUS at 04:05

## 2024-05-29 RX ADMIN — HEPARIN SODIUM 1000 UNITS: 1000 INJECTION, SOLUTION INTRAVENOUS; SUBCUTANEOUS at 06:05

## 2024-05-29 RX ADMIN — CARVEDILOL 6.25 MG: 6.25 TABLET, FILM COATED ORAL at 09:05

## 2024-05-29 RX ADMIN — INSULIN ASPART 1 UNITS: 100 INJECTION, SOLUTION INTRAVENOUS; SUBCUTANEOUS at 01:05

## 2024-05-29 RX ADMIN — FAMOTIDINE 20 MG: 20 TABLET ORAL at 09:05

## 2024-05-29 RX ADMIN — SERTRALINE 100 MG: 100 TABLET, FILM COATED ORAL at 09:05

## 2024-05-29 RX ADMIN — HYDRALAZINE HYDROCHLORIDE 50 MG: 50 TABLET ORAL at 09:05

## 2024-05-29 RX ADMIN — CEFTRIAXONE 1 G: 1 INJECTION, POWDER, FOR SOLUTION INTRAMUSCULAR; INTRAVENOUS at 09:05

## 2024-05-29 RX ADMIN — ATORVASTATIN CALCIUM 80 MG: 40 TABLET, FILM COATED ORAL at 09:05

## 2024-05-29 RX ADMIN — CALCITRIOL 0.5 MCG: 0.5 CAPSULE, LIQUID FILLED ORAL at 09:05

## 2024-05-29 RX ADMIN — CLOPIDOGREL BISULFATE 75 MG: 75 TABLET ORAL at 09:05

## 2024-05-29 RX ADMIN — APIXABAN 5 MG: 5 TABLET, FILM COATED ORAL at 08:05

## 2024-05-29 RX ADMIN — HYDRALAZINE HYDROCHLORIDE 50 MG: 50 TABLET ORAL at 08:05

## 2024-05-29 RX ADMIN — CARVEDILOL 6.25 MG: 6.25 TABLET, FILM COATED ORAL at 08:05

## 2024-05-29 RX ADMIN — CALCIUM ACETATE 667 MG: 667 CAPSULE ORAL at 07:05

## 2024-05-29 NOTE — NURSING
Nurses Note -- 4 Eyes      5/29/2024   4:40 AM      Skin assessed during: Admit      [] No Altered Skin Integrity Present    []Prevention Measures Documented      [x] Yes- Altered Skin Integrity Present or Discovered   [x] LDA Added if Not in Epic (Describe Wound)   [x] New Altered Skin Integrity was Present on Admit and Documented in LDA   [x] Wound Image Taken    Wound Care Consulted? Yes    Attending Nurse:  Luisana Lin RN/Staff Member:   Genny

## 2024-05-29 NOTE — SUBJECTIVE & OBJECTIVE
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    Osteomyelitis     Osteomyelitis of left foot 04/30/2021    Ulcer of left foot     Vaginal delivery     x1     Past Surgical History:   Procedure Laterality Date    ABOVE-KNEE AMPUTATION Left 5/18/2021    Procedure: AMPUTATION, ABOVE KNEE;  Surgeon: Teddy Huber MD;  Location: [facility] OR [address] [place] [address];  Service: Vascular;  Laterality: Left;    ABOVE-KNEE AMPUTATION Right 3/18/2022    Procedure: AMPUTATION, ABOVE KNEE;  Surgeon: DAYNE Florez II, MD;  Location: [facility] OR [address] [place] [address];  Service: Vascular;  Laterality: Right;    Angiogram - Right Extremity Right 7/9/15    angiogram-left leg  10/6/15    ANGIOGRAPHY OF LOWER EXTREMITY Left 4/29/2021    Procedure: ANGIOGRAM, LOWER EXTREMITY;  Surgeon: Teddy Huber MD;  Location: [facility] OR [address] [place] [address];  Service: Vascular;  Laterality: Left;    BELOW KNEE AMPUTATION OF LOWER EXTREMITY Right 12/28/2021    Procedure: AMPUTATION, BELOW KNEE;  Surgeon: Kaitlyn Rojas MD;  Location: [facility] OR;  Service: General;  Laterality: Right;    CATHETERIZATION OF BOTH LEFT AND RIGHT HEART N/A 12/18/2019    Procedure: CATHETERIZATION, HEART, BOTH LEFT AND RIGHT;  Surgeon: Que Fernando III, MD;  Location: [facility] CATH LAB;  Service: Cardiology;  Laterality: N/A;    CORONARY ANGIOGRAPHY N/A 12/18/2019    Procedure: ANGIOGRAM, CORONARY ARTERY;  Surgeon: Que Fernando III, MD;  Location: [facility] CATH LAB;  Service: Cardiology;  Laterality: N/A;    CORONARY ANGIOGRAPHY INCLUDING BYPASS GRAFTS WITH CATHETERIZATION OF LEFT HEART N/A 7/28/2020    Procedure: ANGIOGRAM, CORONARY, INCLUDING BYPASS GRAFT, WITH LEFT HEART CATHETERIZATION, 9 am;  Surgeon: Rachel Easley MD;   Location: [facility] CATH LAB;  Service: Cardiology;  Laterality: N/A;    CORONARY ARTERY BYPASS GRAFT (CABG) N/A 1/14/2020    Procedure: CORONARY ARTERY BYPASS GRAFT (CABG) x 1     Off Pump;  Surgeon: Huang Altamirano MD;  Location: [facility] OR [address] [place] [address];  Service: Cardiovascular;  Laterality: N/A;    CREATION OF FEMORAL-TIBIAL ARTERY BYPASS Left 4/29/2021    Procedure: CREATION, BYPASS, ARTERIAL, FEMORAL TO ANTERIOR TIBIAL;  Surgeon: Teddy Huber MD;  Location: [facility] OR [address] [place] [address];  Service: Vascular;  Laterality: Left;    CREATION OF FEMOROPOPLITEAL ARTERIAL BYPASS USING GRAFT Left 8/18/2020    Procedure: CREATION, BYPASS, ARTERIAL, FEMORAL TO POPLITEAL, USING GRAFT, LEFT LOWER EXTREMITY;  Surgeon: Teddy Huber MD;  Location: [facility];  Service: Vascular;  Laterality: Left;  REQUEST 7:15 A.M. START----COVID NEGATIVE ON 8/17 1ST CASE STARTE PER LEANA ON 8/7/2020 @ 942AM-  RN PREOP 8/12/2020   T/S-----CLEARED BY CARDS-------PENDING INSURANCE    DEBRIDEMENT OF FOOT Left 9/8/2020    Procedure: DEBRIDEMENT, FOOT;  Surgeon: Rosio Mayes DPM;  Location: [facility];  Service: Podiatry;  Laterality: Left;  request neoxx .   RN Pre Op 9-4-2020, Covid negative on 9/5/20. C A    DEBRIDEMENT OF FOOT  3/4/2021    Procedure: DEBRIDEMENT, FOOT;  Surgeon: Teddy Huber MD;  Location: [facility] OR;  Service: Vascular;;    DEBRIDEMENT OF FOOT Left 3/9/2021    Procedure: DEBRIDEMENT, FOOT, bone biopsy;  Surgeon: Rosio Mayes DPM;  Location: [facility] OR;  Service: Podiatry;  Laterality: Left;  Request neoxx---COVID IN AM  REQUESTING NOON START  RN Phone Pre op.On Blood thinners Plavix and Eliquis.  Covid am of surgery. C A    DEBRIDEMENT OF FOOT Left 5/4/2021    Procedure: DEBRIDEMENT, FOOT;  Surgeon: Farooq Morley DPM;  Location: [address];  Service: Podiatry;  Laterality: Left;    INSERTION OF TUNNELED CENTRAL VENOUS HEMODIALYSIS CATHETER N/A 1/27/2020    Procedure: Insertion, Catheter, Central Venous,  Hemodialysis;  Surgeon: ESTEBAN Gomez III, MD;  Location: [facility] CATH LAB;  Service: Peripheral Vascular;  Laterality: N/A;    INSERTION OF TUNNELED CENTRAL VENOUS HEMODIALYSIS CATHETER  5/10/2023    Procedure: Insertion, Catheter, Central Venous, Hemodialysis;  Surgeon: Romulo Queen MD;  Location: [facility] CATH LAB;  Service: Cardiology;;    PERCUTANEOUS TRANSLUMINAL ANGIOPLASTY N/A 3/4/2021    Procedure: PTA (ANGIOPLASTY, PERCUTANEOUS, TRANSLUMINAL);  Surgeon: Teddy Huber MD;  Location: [facility] OR;  Service: Vascular;  Laterality: N/A;    REMOVAL OF ARTERIOVENOUS GRAFT Left 5/27/2021    Procedure: REMOVAL, GRAFT, LEFT LOWER EXTREMITY, WOUND EXPLORATION;  Surgeon: Teddy Huber MD;  Location: [facility] OR 2ND FLR;  Service: Vascular;  Laterality: Left;    REMOVAL OF NAIL OF DIGIT Left 3/9/2021    Procedure: REMOVAL, NAIL, DIGIT;  Surgeon: Rosio Mayes DPM;  Location: [facility] OR;  Service: Podiatry;  Laterality: Left;    RIGHT HEART CATHETERIZATION Right 5/10/2023    Procedure: INSERTION, CATHETER, RIGHT HEART;  Surgeon: Romulo Queen MD;  Location: [facility] CATH LAB;  Service: Cardiology;  Laterality: Right;    THROMBECTOMY Left 3/4/2021    Procedure: THROMBECTOMY, LEFT LOWER EXTREMITY BYPASS GRAFT, ANGIOGRAM, POSSIBLE INTERVENTION, POSSIBLE LEFT LOWER EXTREMITY BYPASS;  Surgeon: Teddy Huber MD;  Location: [facility] OR;  Service: Vascular;  Laterality: Left;    THROMBECTOMY Left 4/29/2021    Procedure: GRAFT THROMBECTOMY, LEFT LOWER EXTREMITY;  Surgeon: Teddy Huber MD;  Location: [facility] OR 2ND FLR;  Service: Vascular;  Laterality: Left;  14.5 min  1179.85 mGy  341.01 Gycm2  240 ml dye    THROMBECTOMY  10/22/2021    Procedure: THROMBECTOMY;  Surgeon: Saad Arenas MD;  Location: [facility] CATH LAB/EP;  Service: Cardiology;;     Social History     Tobacco Use    Smoking status: Former    Smokeless tobacco: Never   Substance Use Topics    Alcohol use: No    Drug use: Yes     Types: Marijuana      "Comment: occassional     Review of patient's allergies indicates:   Allergen Reactions    Ciprofloxacin Itching    Iodine      Kidney injury    Pcn [penicillins]      Rash; tolerated ceftriaxone on 1/13/20       Medications: I have reviewed the current medication administration record.    (Not in a hospital admission)      Review of Systems   Unable to perform ROS: Other (Patient only stating name and not following much commands or answering questions)     Objective:     Vital Signs (Most Recent):  Temp: 98.8 °F (37.1 °C) (05/28/24 2054)  Pulse: 100 (05/28/24 2153)  Resp: (!) 4 (05/28/24 2153)  BP: (!) 143/70 (05/28/24 2054)  SpO2: 96 % (05/28/24 2153)    Vital Signs Range (Last 24H):  Temp:  [98.8 °F (37.1 °C)]   Pulse:  []   Resp:  [4-20]   BP: (143)/(70)   SpO2:  [96 %-100 %]        Physical Exam  Vitals and nursing note reviewed.   Constitutional:       General: She is not in acute distress.  HENT:      Head: Normocephalic.      Mouth/Throat:      Mouth: Mucous membranes are dry.   Cardiovascular:      Rate and Rhythm: Tachycardia present.   Pulmonary:      Effort: Pulmonary effort is normal. No respiratory distress.   Musculoskeletal:      Comments: B/L AKA    Neurological:      Mental Status: She is alert. She is disoriented.      Comments: Bilateral AKA               Neurological Exam:   LOC: alert  Attention Span: poor  Language: Expressive aphasia, Naming impaired, Repetition impaired  Articulation: Dysarthria  Orientation: Not oriented to place, Not oriented to time  Motor: Arm left  Paresis: 4/5  Leg left  AKA  Arm right  Paresis: 4/5  Leg right AKA      Laboratory:  CMP: No results for input(s): "GLUCOSE", "CALCIUM", "ALBUMIN", "PROT", "NA", "K", "CO2", "CL", "BUN", "CREATININE", "ALKPHOS", "ALT", "AST", "BILITOT" in the last 24 hours.  CBC: No results for input(s): "WBC", "RBC", "HGB", "HCT", "PLT", "MCV", "MCH", "MCHC" in the last 168 hours.  Lipid Panel: No results for input(s): "CHOL", " ""LDLCALC", "HDL", "TRIG" in the last 168 hours.  Coagulation: No results for input(s): "PT", "INR", "APTT" in the last 168 hours.  Hgb A1C: No results for input(s): "HGBA1C" in the last 168 hours.  TSH: No results for input(s): "TSH" in the last 168 hours.    Diagnostic Results:      Brain imaging/Vessel Imaging:  CTA Stroke MP - 5/28/2024     Impression:     No acute intracranial process.  Presumed volume loss with enlargement of the ventricles similar to prior studies.     Extensive calcification but no significant stenosis at the carotid bifurcations with mild narrowing right greater than left.  Mild narrowing at the origin of vertebral arteries bilaterally.  Question element of fibromuscular dysplasia of the cervical ICA bilaterally.     Mild to moderate stenosis of the cavernous/supraclinoid ICA.  No major branch occlusion at the [id number].    Cardiac Evaluation:   EKG from today: Sinus rhythm with PACs, VENT RATE OF 97 BPM     TTE 5/8/2024     Summary       Left Ventricle: The left ventricle is mildly dilated. Ventricular mass is normal. Normal wall thickness. Severe global hypokinesis present. There is severely reduced systolic function. Ejection fraction by visual approximation is 15%. There is diastolic dysfunction.    Right Ventricle: Severe right ventricular enlargement. Wall thickness is normal. Right ventricle wall motion has global hypokinesis. Systolic function is mildly reduced.    Left Atrium: Left atrium is severely dilated. There is an atrial septal aneurysm.    Right Atrium: Right atrium is dilated.    Aortic Valve: There is mild aortic regurgitation.    Mitral Valve: There is mild regurgitation.    Tricuspid Valve: There is annular dilation present. There is normal leaflet mobility. There is severe functional regurgitation.    Pulmonary Artery: The estimated pulmonary artery systolic pressure is at least 24 mmHg. PASP is likely under-estimated in the setting of severe tricuspid " regurgitation.    IVC/SVC: Central venous pressure of at least 8 mmHg.    No vegetation seen.

## 2024-05-29 NOTE — PROCEDURES
EEG REPORT      Suyapa Connelly  [id number]  1965    DATE OF SERVICE: 5/29/2024         METHODOLOGY      Extended electroencephalographic recording is made while the patient is ambulatory and continuing normal daily activities.  Electrodes are placed according to the International 10-20 placement system and included T1 and T2 electrode placement.  Twenty four (24) channels of digital signal (sampling rate of 512/sec) was simultaneously recorded from the scalp including EKG and eye monitors.  Recording band pass was 0.1 to 100 hz and all data was stored digitally on the recorder.  The patient is instructed to press an event button when clinical symptoms occur and write the symptoms into a diary. Activation procedures which include photic stimulation, hyperventilation and instructing patients to perform simple task are done in selected patients.        The EEG is displayed on a monitor screen and can be reformatted into different montages for evaluation.  The entire recoding is submitted for computer assisted analysis to detect spike and electrographic seizure activity.  The entire recording is visually reviewed and the times identified by computer analysis as being spikes or seizures are reviewed again.  Compresses spectral analysis (CSA) is also performed on the activity recorded from each individual channel.  This is displayed as a power display of frequencies from 0 to 30 Hz over time.   The CSA analysis is done and displayed continuously.  This is reviewed for asymmetries in power between homologous areas of the scalp and for presence of changes in power which canbe seen when seizures occur.  Sections of suspected abnormalities on the CSA is then compared with the original EEG recording.   Eferio software was also utilized in the review of this study.  This software suite analyzes the EEG recording in multiple domains.  Coherence and rhythmicity is computed to identify EEG sections which may contain  organized seizures.  Each channel undergoes analysis to detect presence of spike and sharp waves which have special and morphological characteristic of epileptic activity.  The routine EEG recording is converted from spacial into frequency domain.  This is then displayed comparing homologous areas to identify areas of significant asymmetry.  Algorithm to identify non-cortically generated artifact is used to separate eye movement, EMG and other artifact from the EEG     Recording Times    A total of 00:30:28 hours of EEG was recorded.      EEG FINDINGS:  Background activity:   The background rhythm was characterized by partially organized theta and alpha activity with absent posterior dominant rhythm.   Symmetry and continuity: the background was continuous and symmetric     Sleep:   No sleep transients.    Activation procedures:   NA    Abnormal activity:   No epileptiform discharges, periodic discharges, lateralized rhythmic delta activity or electrographic seizures were seen.    IMPRESSION:   Abnormal EEG due to a mild to moderate generalized cerebral dysfunction with no electrographic seizures or indications of seizure tendency.      Levi Baker MD  Neurology-Epilepsy.  [facility].

## 2024-05-29 NOTE — CONSULTS
[facility]  Nephrology  Consult Note    Patient Name: Suyapa Connelly  MRN: [id number]  Admission Date: 5/28/2024  Hospital Length of Stay: 0 days  Attending Provider: Frances Bee MD   Primary Care Physician: Magen Christensen MD  Principal Problem:Acute encephalopathy    Inpatient consult to Nephrology  Consult performed by: Katie Monique, HCRIS  Consult ordered by: Leticia Ley, MARYAM  Reason for consult: ESRD        Subjective:     HPI: Suyapa Connelly is a 58 y.o. female with PMHx of b/l AKAs, ESRD on HD, CAD s/p CABG, afib on eliquis, HTN here with AMS.  at bedside assists with history. He reports patient was in her normal state of health until when they were driving and stopped at a gas station. She suddenly became rigid with her arms extending outwards and her head back with her eyes rolling backwards then suddenly relaxed and had a large bowel movement. Since this episode she has been confused and altered with abnormal speech and generalized weakness.  has never witnessed a similar episode. She skipped HD Monday, last full session Friday. History from patient limited. She follows some commands and is tearful ad reports she is afraid of being in the hospital, does not answer questions. CTA without acute intracranial process. Vascular neurology felt symptoms were stroke mimic/post ictal and recommended EEG/admission. Nephrology consulted for ESRD.        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    Osteomyelitis     Osteomyelitis of left foot 04/30/2021    Ulcer of left foot     Vaginal delivery     x1       Past Surgical History:   Procedure Laterality Date    ABOVE-KNEE AMPUTATION Left 5/18/2021     Procedure: AMPUTATION, ABOVE KNEE;  Surgeon: Teddy Huber MD;  Location: [address];  Service: Vascular;  Laterality: Left;    ABOVE-KNEE AMPUTATION Right 3/18/2022    Procedure: AMPUTATION, ABOVE KNEE;  Surgeon: DAYNE Florez II, MD;  Location: [facility] OR [address] [place] [address];  Service: Vascular;  Laterality: Right;    Angiogram - Right Extremity Right 7/9/15    angiogram-left leg  10/6/15    ANGIOGRAPHY OF LOWER EXTREMITY Left 4/29/2021    Procedure: ANGIOGRAM, LOWER EXTREMITY;  Surgeon: Teddy Huber MD;  Location: [address];  Service: Vascular;  Laterality: Left;    BELOW KNEE AMPUTATION OF LOWER EXTREMITY Right 12/28/2021    Procedure: AMPUTATION, BELOW KNEE;  Surgeon: Kaitlyn Rojas MD;  Location: [facility];  Service: General;  Laterality: Right;    CATHETERIZATION OF BOTH LEFT AND RIGHT HEART N/A 12/18/2019    Procedure: CATHETERIZATION, HEART, BOTH LEFT AND RIGHT;  Surgeon: Que Fernando III, MD;  Location: [facility] CATH LAB;  Service: Cardiology;  Laterality: N/A;    CORONARY ANGIOGRAPHY N/A 12/18/2019    Procedure: ANGIOGRAM, CORONARY ARTERY;  Surgeon: Que Fernando III, MD;  Location: [facility] CATH LAB;  Service: Cardiology;  Laterality: N/A;    CORONARY ANGIOGRAPHY INCLUDING BYPASS GRAFTS WITH CATHETERIZATION OF LEFT HEART N/A 7/28/2020    Procedure: ANGIOGRAM, CORONARY, INCLUDING BYPASS GRAFT, WITH LEFT HEART CATHETERIZATION, 9 am;  Surgeon: Rachel Easley MD;  Location: [facility] CATH LAB;  Service: Cardiology;  Laterality: N/A;    CORONARY ARTERY BYPASS GRAFT (CABG) N/A 1/14/2020    Procedure: CORONARY ARTERY BYPASS GRAFT (CABG) x 1     Off Pump;  Surgeon: Huang Altamirano MD;  Location: [address];  Service: Cardiovascular;  Laterality: N/A;    CREATION OF FEMORAL-TIBIAL ARTERY BYPASS Left 4/29/2021    Procedure: CREATION, BYPASS, ARTERIAL, FEMORAL TO ANTERIOR TIBIAL;  Surgeon: Teddy Huber MD;  Location: [address];  Service: Vascular;  Laterality: Left;     CREATION OF FEMOROPOPLITEAL ARTERIAL BYPASS USING GRAFT Left 8/18/2020    Procedure: CREATION, BYPASS, ARTERIAL, FEMORAL TO POPLITEAL, USING GRAFT, LEFT LOWER EXTREMITY;  Surgeon: Teddy Huber MD;  Location: [facility] OR;  Service: Vascular;  Laterality: Left;  REQUEST 7:15 A.M. START----COVID NEGATIVE ON 8/17  1ST CASE STARTE PER LEANA ON 8/7/2020 @ 942AM-LO  RN PREOP 8/12/2020   T/S-----CLEARED BY CARDS-------PENDING INSURANCE    DEBRIDEMENT OF FOOT Left 9/8/2020    Procedure: DEBRIDEMENT, FOOT;  Surgeon: Rosio Mayes DPM;  Location: [facility] OR;  Service: Podiatry;  Laterality: Left;  request neoxx .   RN Pre Op 9-4-2020, Covid negative on 9/5/20. C A    DEBRIDEMENT OF FOOT  3/4/2021    Procedure: DEBRIDEMENT, FOOT;  Surgeon: Teddy Huber MD;  Location: [facility] OR;  Service: Vascular;;    DEBRIDEMENT OF FOOT Left 3/9/2021    Procedure: DEBRIDEMENT, FOOT, bone biopsy;  Surgeon: Rosio Mayes DPM;  Location: [facility] OR;  Service: Podiatry;  Laterality: Left;  Request neoxx---COVID IN AM  REQUESTING NOON START  RN Phone Pre op.On Blood thinners Plavix and Eliquis.  Covid am of surgery. C A    DEBRIDEMENT OF FOOT Left 5/4/2021    Procedure: DEBRIDEMENT, FOOT;  Surgeon: Farooq Morley DPM;  Location: [address];  Service: Podiatry;  Laterality: Left;    INSERTION OF TUNNELED CENTRAL VENOUS HEMODIALYSIS CATHETER N/A 1/27/2020    Procedure: Insertion, Catheter, Central Venous, Hemodialysis;  Surgeon: ESTEBAN Gomez III, MD;  Location: [facility] CATH LAB;  Service: Peripheral Vascular;  Laterality: N/A;    INSERTION OF TUNNELED CENTRAL VENOUS HEMODIALYSIS CATHETER  5/10/2023    Procedure: Insertion, Catheter, Central Venous, Hemodialysis;  Surgeon: Romulo Queen MD;  Location: [facility] CATH LAB;  Service: Cardiology;;    PERCUTANEOUS TRANSLUMINAL ANGIOPLASTY N/A 3/4/2021    Procedure: PTA (ANGIOPLASTY, PERCUTANEOUS, TRANSLUMINAL);  Surgeon: Teddy Huber MD;  Location: [facility] OR;  Service: Vascular;   Laterality: N/A;    REMOVAL OF ARTERIOVENOUS GRAFT Left 5/27/2021    Procedure: REMOVAL, GRAFT, LEFT LOWER EXTREMITY, WOUND EXPLORATION;  Surgeon: Teddy Huber MD;  Location: [facility] OR 2ND FLR;  Service: Vascular;  Laterality: Left;    REMOVAL OF NAIL OF DIGIT Left 3/9/2021    Procedure: REMOVAL, NAIL, DIGIT;  Surgeon: Rosio Mayes DPM;  Location: [facility] OR;  Service: Podiatry;  Laterality: Left;    RIGHT HEART CATHETERIZATION Right 5/10/2023    Procedure: INSERTION, CATHETER, RIGHT HEART;  Surgeon: Romulo Queen MD;  Location: [facility] CATH LAB;  Service: Cardiology;  Laterality: Right;    THROMBECTOMY Left 3/4/2021    Procedure: THROMBECTOMY, LEFT LOWER EXTREMITY BYPASS GRAFT, ANGIOGRAM, POSSIBLE INTERVENTION, POSSIBLE LEFT LOWER EXTREMITY BYPASS;  Surgeon: Teddy Huber MD;  Location: [facility] OR;  Service: Vascular;  Laterality: Left;    THROMBECTOMY Left 4/29/2021    Procedure: GRAFT THROMBECTOMY, LEFT LOWER EXTREMITY;  Surgeon: Teddy Huber MD;  [facility];  Service: Vascular;  Laterality: Left;  14.5 min  1179.85 mGy  341.01 Gycm2  240 ml dye    THROMBECTOMY  10/22/2021    Procedure: THROMBECTOMY;  Surgeon: Saad Arenas MD;  Location: [facility] CATH LAB/EP;  Service: Cardiology;;       Review of patient's allergies indicates:   Allergen Reactions    Ciprofloxacin Itching    Iodine      Kidney injury    Pcn [penicillins]      Rash; tolerated ceftriaxone on 1/13/20     Current Facility-Administered Medications   Medication Frequency    albuterol-ipratropium 2.5 mg-0.5 mg/3 mL nebulizer solution 3 mL Q4H PRN    [START ON 5/30/2024] allopurinol split tablet 50 mg Every other day    aluminum-magnesium hydroxide-simethicone 200-200-20 mg/5 mL suspension 30 mL QID PRN    apixaban tablet 5 mg BID    atorvastatin tablet 80 mg Daily    bisacodyL suppository 10 mg Daily PRN    calcitRIOL capsule 0.5 mcg Daily    calcium acetate(phosphat bind) capsule 667 mg TID WM    carvediloL tablet  6.25 mg BID    cefTRIAXone (Rocephin) 1 g in dextrose 5 % in water (D5W) 100 mL IVPB (MB+) Q24H    clopidogreL tablet 75 mg Daily    dextrose 10% bolus 125 mL 125 mL PRN    dextrose 10% bolus 250 mL 250 mL PRN    famotidine tablet 20 mg Daily    glucagon (human recombinant) injection 1 mg PRN    glucose chewable tablet 16 g PRN    glucose chewable tablet 24 g PRN    hydrALAZINE injection 5 mg Q6H PRN    hydrALAZINE tablet 50 mg TID    insulin aspart U-100 pen 0-5 Units QID (AC + HS) PRN    melatonin tablet 6 mg Nightly PRN    naloxone 0.4 mg/mL injection 0.02 mg PRN    ondansetron disintegrating tablet 8 mg Q8H PRN    polyethylene glycol packet 17 g BID PRN    prochlorperazine injection Soln 5 mg Q6H PRN    sertraline tablet 100 mg Daily    simethicone chewable tablet 80 mg QID PRN    sodium chloride 0.9% flush 5 mL PRN     Family History       Problem Relation (Age of Onset)    Diabetes Mother, Father, Paternal Grandmother    Heart disease Maternal Grandmother    No Known Problems Maternal Grandfather, Paternal Grandfather          Tobacco Use    Smoking status: Former    Smokeless tobacco: Never   Substance and Sexual Activity    Alcohol use: No    Drug use: Yes     Types: Marijuana     Comment: occassional    Sexual activity: Yes     Partners: Male     Review of Systems   Unable to perform ROS: Mental status change   Objective:     Vital Signs (Most Recent):  Temp: 97.2 °F (36.2 °C) (05/29/24 0737)  Pulse: 86 (05/29/24 0737)  Resp: 18 (05/29/24 0737)  BP: 130/88 (05/29/24 0737)  SpO2: 95 % (05/29/24 0737) Vital Signs (24h Range):  Temp:  [97.2 °F (36.2 °C)-98.8 °F (37.1 °C)] 97.2 °F (36.2 °C)  Pulse:  [] 86  Resp:  [4-20] 18  SpO2:  [95 %-100 %] 95 %  BP: (127-198)/() 130/88     Weight: 70.1 kg (154 lb 8.7 oz) (05/29/24 0350)  Body mass index is 47.16 kg/m².  Body surface area is 1.54 meters squared.    No intake/output data recorded.     Physical Exam  Vitals and nursing note reviewed.    Constitutional:       General: She is not in acute distress.     Appearance: She is ill-appearing.      Comments: Alert but minimally conversant, dysarthric    HENT:      Head: Normocephalic and atraumatic.      Nose: Nose normal.      Mouth/Throat:      Pharynx: No oropharyngeal exudate.   Eyes:      Extraocular Movements: Extraocular movements intact.   Cardiovascular:      Rate and Rhythm: Normal rate and regular rhythm.      Heart sounds: Normal heart sounds.   Pulmonary:      Effort: Pulmonary effort is normal. No respiratory distress.      Breath sounds: Normal breath sounds.   Abdominal:      General: Bowel sounds are normal. There is no distension.      Palpations: Abdomen is soft.      Tenderness: There is no abdominal tenderness.   Musculoskeletal:         General: Deformity present.      Cervical back: Normal range of motion and neck supple.      Comments: Bilateral AKA  No pitting edema   Skin:     General: Skin is warm and dry.   Neurological:      General: No focal deficit present.      Mental Status: She is alert.      Motor: Weakness present.      Comments: Oriented to self and can identify  by name. Disoriented to place, situation, time  Dysarthric and aphasic   Follows some commands, requires repeated prompting    Able to sit herself up in bed, equal  strength bilaterally but generally weak    Psychiatric:         Mood and Affect: Affect is tearful.      Significant Labs:  CBC:   Recent Labs   Lab 05/29/24  0458   WBC 4.83   RBC 3.46*   HGB 10.8*   HCT 34.4*      MCV 99*   MCH 31.2*   MCHC 31.4*     CMP:   Recent Labs   Lab 05/29/24  0458   *   CALCIUM 8.7   ALBUMIN 2.8*   PROT 8.4      K 4.9   CO2 21*      BUN 78*   CREATININE 5.9*   ALKPHOS 217*   ALT 14   AST 21   BILITOT 1.0     All labs within the past 24 hours have been reviewed.        Assessment/Plan:     Neuro  * Acute encephalopathy  -management per primary     Renal/  ESRD (end stage renal  disease)  ESRD on iHD MWF  Dr. Jesika MATA TDC  + residual renal fx     Missed HD session on Monday prior to presentation.     Plan/Recommendations:     -HD today 5/29 for metabolic clearance and volume management   -strict I/O's  -Renal diet when advanced  -Continue calcium acetate   -Hgb @ goal for ESRD, continue trending.  No need for NAEEM therapy.        Thank you for your consult. I will follow-up with patient. Please contact us if you have any additional questions.    Katie Monique DNP  Nephrology  [facility]

## 2024-05-29 NOTE — HPI
Suyapa Connelly is a 58 y.o. female with PMHx of b/l AKAs, ESRD on HD, CAD s/p CABG, afib on eliquis, HTN here with AMS.  at bedside assists with history. He reports patient was in her normal state of health until when they were driving and stopped at a gas station. She suddenly became rigid with her arms extending outwards and her head back with her eyes rolling backwards then suddenly relaxed and had a large bowel movement. Since this episode she has been confused and altered with abnormal speech and generalized weakness.  has never witnessed a similar episode. She skipped HD Monday, last full session Friday. History from patient limited. She follows some commands and is tearful ad reports she is afraid of being in the hospital, does not answer questions. CTA without acute intracranial process. Vascular neurology felt symptoms were stroke mimic/post ictal and recommended EEG/admission. Nephrology consulted for ESRD.

## 2024-05-29 NOTE — PROGRESS NOTES
Patient arrived on a stretcher  to dialysis unit.   Report received from Cecilia Malin per dialysis Flowsheet.     Hemodialysis initiated using the following:     Dialysis Access: R IJ perm cath, tolerated well, flows good. Blood obtained for Pre tx BUN,  called for         Will Maintain telemetry and blood pressure monitoring throughout treatment.  Refer to dialysis flowsheet and MAR for details.

## 2024-05-29 NOTE — PROGRESS NOTES
HD tx complete  1 L removed over 3.5 hours, tolerated well  Blood returned, both lumens flushed with NS, hep locked, capped, taped and wrapped  Sterile dressing change performed to catheter site, tolerated well  Report given to primary nurse Cecilia  Transferred from unit back to room 1149 by transport

## 2024-05-29 NOTE — ED PROVIDER NOTES
Encounter Date: 5/28/2024       History     Chief Complaint   Patient presents with    Altered Mental Status     Pt's  reports that pt's eyes rolled back, she became unresponsive, and defecated on herself, and is now altered,  reports she is AAOx4 at baseline, pt is awake and alert in triage, but not responding to any questions     HPI    59 y/o F PMH ESRD on HD, HTN, DM, AKA b/l who presents to the ED for evaluation of syncope.  History from spouse who is present.  He reports that just PTA patient had LOC, eyes rolled back in head, and she became unresponsive. Afterwards she awoke a bit, but has not talked and been quite confused.  Stroke code called, to CT scanner.      Review of patient's allergies indicates:   Allergen Reactions    Ciprofloxacin Itching    Iodine      Kidney injury    Pcn [penicillins]      Rash; tolerated ceftriaxone on 1/13/20     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    Osteomyelitis     Osteomyelitis of left foot 04/30/2021    Ulcer of left foot     Vaginal delivery     x1     Past Surgical History:   Procedure Laterality Date    ABOVE-KNEE AMPUTATION Left 5/18/2021    Procedure: AMPUTATION, ABOVE KNEE;  Surgeon: Teddy Huber MD;  Location: [address];  Service: Vascular;  Laterality: Left;    ABOVE-KNEE AMPUTATION Right 3/18/2022    Procedure: AMPUTATION, ABOVE KNEE;  Surgeon: DAYNE Florez II, MD;  Location: [address];  Service: Vascular;  Laterality: Right;    Angiogram - Right Extremity Right 7/9/15    angiogram-left leg  10/6/15    ANGIOGRAPHY OF LOWER EXTREMITY Left 4/29/2021    Procedure: ANGIOGRAM, LOWER EXTREMITY;  Surgeon: Teddy Huber MD;  Location: [address]  [facility];  Service: Vascular;  Laterality: Left;    BELOW KNEE AMPUTATION OF LOWER EXTREMITY Right 12/28/2021    Procedure: AMPUTATION, BELOW KNEE;  Surgeon: Kaitlyn Rojas MD;  Location: [facility];  Service: General;  Laterality: Right;    CATHETERIZATION OF BOTH LEFT AND RIGHT HEART N/A 12/18/2019    Procedure: CATHETERIZATION, HEART, BOTH LEFT AND RIGHT;  Surgeon: Que Fernando III, MD;  Location: [facility] CATH LAB;  Service: Cardiology;  Laterality: N/A;    CORONARY ANGIOGRAPHY N/A 12/18/2019    Procedure: ANGIOGRAM, CORONARY ARTERY;  Surgeon: Que Fernando III, MD;  Location: [facility] CATH LAB;  Service: Cardiology;  Laterality: N/A;    CORONARY ANGIOGRAPHY INCLUDING BYPASS GRAFTS WITH CATHETERIZATION OF LEFT HEART N/A 7/28/2020    Procedure: ANGIOGRAM, CORONARY, INCLUDING BYPASS GRAFT, WITH LEFT HEART CATHETERIZATION, 9 am;  Surgeon: Rachel Easley MD;  Location: [facility] CATH LAB;  Service: Cardiology;  Laterality: N/A;    CORONARY ARTERY BYPASS GRAFT (CABG) N/A 1/14/2020    Procedure: CORONARY ARTERY BYPASS GRAFT (CABG) x 1     Off Pump;  Surgeon: Huang Altamirano MD;  [facility];  Service: Cardiovascular;  Laterality: N/A;    CREATION OF FEMORAL-TIBIAL ARTERY BYPASS Left 4/29/2021    Procedure: CREATION, BYPASS, ARTERIAL, FEMORAL TO ANTERIOR TIBIAL;  Surgeon: Teddy Huber MD;  [facility];  Service: Vascular;  Laterality: Left;    CREATION OF FEMOROPOPLITEAL ARTERIAL BYPASS USING GRAFT Left 8/18/2020    Procedure: CREATION, BYPASS, ARTERIAL, FEMORAL TO POPLITEAL, USING GRAFT, LEFT LOWER EXTREMITY;  Surgeon: Teddy Huber MD;  Location: [facility];  Service: Vascular;  Laterality: Left;  REQUEST 7:15 A.M. START----COVID NEGATIVE ON 8/17  1ST CASE STARTE PER LEANA ON 8/7/2020 @ 942AM-LO  RN PREOP 8/12/2020   T/S-----CLEARED BY CARDS-------PENDING INSURANCE    DEBRIDEMENT OF FOOT Left 9/8/2020    Procedure: DEBRIDEMENT, FOOT;  Surgeon: Rosio Mayes DPM;  Location:  [facility] OR;  Service: Podiatry;  Laterality: Left;  request neoxx .   RN Pre Op 9-4-2020, Covid negative on 9/5/20. C A    DEBRIDEMENT OF FOOT  3/4/2021    Procedure: DEBRIDEMENT, FOOT;  Surgeon: Teddy Huber MD;  Location: [facility] OR;  Service: Vascular;;    DEBRIDEMENT OF FOOT Left 3/9/2021    Procedure: DEBRIDEMENT, FOOT, bone biopsy;  Surgeon: Rosio Mayes DPM;  Location: [facility] OR;  Service: Podiatry;  Laterality: Left;  Request neoxx---COVID IN AM  REQUESTING NOON START  RN Phone Pre op.On Blood thinners Plavix and Eliquis.  Covid am of surgery. C A    DEBRIDEMENT OF FOOT Left 5/4/2021    Procedure: DEBRIDEMENT, FOOT;  Surgeon: Farooq Morley DPM;  [address];  Service: Podiatry;  Laterality: Left;    INSERTION OF TUNNELED CENTRAL VENOUS HEMODIALYSIS CATHETER N/A 1/27/2020    Procedure: Insertion, Catheter, Central Venous, Hemodialysis;  Surgeon: ESTEBAN Gomez III, MD;  Location: [facility] CATH LAB;  Service: Peripheral Vascular;  Laterality: N/A;    INSERTION OF TUNNELED CENTRAL VENOUS HEMODIALYSIS CATHETER  5/10/2023    Procedure: Insertion, Catheter, Central Venous, Hemodialysis;  Surgeon: Romulo Queen MD;  Location: [facility] CATH LAB;  Service: Cardiology;;    PERCUTANEOUS TRANSLUMINAL ANGIOPLASTY N/A 3/4/2021    Procedure: PTA (ANGIOPLASTY, PERCUTANEOUS, TRANSLUMINAL);  Surgeon: Teddy Huber MD;  Location: [facility] OR;  Service: Vascular;  Laterality: N/A;    REMOVAL OF ARTERIOVENOUS GRAFT Left 5/27/2021    Procedure: REMOVAL, GRAFT, LEFT LOWER EXTREMITY, WOUND EXPLORATION;  Surgeon: Teddy Huber MD;  Location: [facility];  Service: Vascular;  Laterality: Left;    REMOVAL OF NAIL OF DIGIT Left 3/9/2021    Procedure: REMOVAL, NAIL, DIGIT;  Surgeon: Rosio Mayes DPM;  Location: [facility];  Service: Podiatry;  Laterality: Left;    RIGHT HEART CATHETERIZATION Right 5/10/2023    Procedure: INSERTION, CATHETER, RIGHT HEART;  Surgeon: Romulo Queen MD;  Location: [facility]  CATH LAB;  Service: Cardiology;  Laterality: Right;    THROMBECTOMY Left 3/4/2021    Procedure: THROMBECTOMY, LEFT LOWER EXTREMITY BYPASS GRAFT, ANGIOGRAM, POSSIBLE INTERVENTION, POSSIBLE LEFT LOWER EXTREMITY BYPASS;  Surgeon: Teddy Huber MD;  Location: [facility] OR;  Service: Vascular;  Laterality: Left;    THROMBECTOMY Left 4/29/2021    Procedure: GRAFT THROMBECTOMY, LEFT LOWER EXTREMITY;  Surgeon: Teddy Huber MD;  [facility];  Service: Vascular;  Laterality: Left;  14.5 min  1179.85 mGy  341.01 Gycm2  240 ml dye    THROMBECTOMY  10/22/2021    Procedure: THROMBECTOMY;  Surgeon: Saad Arenas MD;  Location: [facility] CATH LAB/EP;  Service: Cardiology;;     Family History   Problem Relation Name Age of Onset    Diabetes Mother      Diabetes Father      Heart disease Maternal Grandmother      No Known Problems Maternal Grandfather      Diabetes Paternal Grandmother      No Known Problems Paternal Grandfather      Anesthesia problems Neg Hx       Social History     Tobacco Use    Smoking status: Former    Smokeless tobacco: Never   Substance Use Topics    Alcohol use: No    Drug use: Yes     Types: Marijuana     Comment: occassional     Review of Systems   Unable to perform ROS: Mental status change       Physical Exam     Initial Vitals [05/28/24 2054]   BP Pulse Resp Temp SpO2   (!) 143/70 96 20 98.8 °F (37.1 °C) 100 %      MAP       --         Physical Exam    HENT:   Head: Atraumatic.   Eyes: EOM are normal.   Staring off, does not track examiner   Cardiovascular:  Normal rate and regular rhythm.           Pulmonary/Chest: No respiratory distress.   Abdominal: Abdomen is soft.   Musculoskeletal:         General: No edema.      Comments: B/l AKA     Neurological:   Awake, follows some commands, does not speak or answer questions, moves both arms   Skin: Skin is warm and dry.   Psychiatric:   disoriented         ED Course   Procedures  Labs Reviewed   CBC W/ AUTO DIFFERENTIAL - Abnormal;  Notable for the following components:       Result Value    RBC 3.47 (*)     Hemoglobin 11.3 (*)     Hematocrit 33.6 (*)     MCH 32.6 (*)     RDW 18.1 (*)     Mono % 15.1 (*)     All other components within normal limits   COMPREHENSIVE METABOLIC PANEL - Abnormal; Notable for the following components:    Potassium 5.2 (*)     CO2 20 (*)     Glucose 264 (*)     BUN 74 (*)     Creatinine 5.8 (*)     Total Protein 8.8 (*)     Albumin 2.9 (*)     Total Bilirubin 1.1 (*)     Alkaline Phosphatase 221 (*)     eGFR 8.0 (*)     All other components within normal limits   PROTIME-INR - Abnormal; Notable for the following components:    Prothrombin Time 13.2 (*)     All other components within normal limits   POCT GLUCOSE - Abnormal; Notable for the following components:    POCT Glucose 284 (*)     All other components within normal limits   ISTAT CREATININE - Abnormal; Notable for the following components:    POC Creatinine 6.3 (*)     All other components within normal limits   ISTAT PROCEDURE - Abnormal; Notable for the following components:    POC PTWBT 17.0 (*)     POC PTINR 1.4 (*)     All other components within normal limits   TSH   LIPID PANEL   DRUG SCREEN PANEL, URINE EMERGENCY   POCT GLUCOSE, HAND-HELD DEVICE          Imaging Results              CTA STROKE MULTI-PHASE (Final result)  Result time 05/28/24 22:11:43      Final result by Wan Pagan MD (05/28/24 22:11:43)                   Impression:      No acute intracranial process.  Presumed volume loss with enlargement of the ventricles similar to prior studies.    Extensive calcification but no significant stenosis at the carotid bifurcations with mild narrowing right greater than left.  Mild narrowing at the origin of vertebral arteries bilaterally.  Question element of fibromuscular dysplasia of the cervical ICA bilaterally.    Mild to moderate stenosis of the cavernous/supraclinoid ICA.  No major branch occlusion at the  [id number].      Electronically signed by: Wan Pagan  Date:    05/28/2024  Time:    22:11               Narrative:    EXAMINATION:  CTA STROKE MULTI-PHASE    CLINICAL HISTORY:  Neuro deficit, acute, stroke suspected;    TECHNIQUE:  Non contrast low dose axial images were obtained thought the head. CT angiogram was performed from the level of the mireya to the top of the head following the IV administration of 75mL of Omnipaque 350.   Sagittal and coronal reconstructions and maximum intensity projection reconstructions were performed. Arterial stenosis percentages are based on NASCET measurement criteria.  Two additional phases of immediate post-contrast CTA images were performed through the head alone.    3D reformats were created on an independent workstation to evaluate the [id number].    COMPARISON:  05/07/2024    FINDINGS:  Widening of the ventricles presumably reflecting volume loss.    No evidence of intracranial hemorrhage mass effect or acute territorial infarct.  Decreased attenuation within the periventricular white matter is nonspecific but may reflect prominent chronic small vessel ischemic change versus other leukoencephalopathy.  Dystrophic calcification left frontal white matter again noted.  Small lipoma of the corpus callosum.    CTA:    No advanced stenosis at the origin of the vessels from the aortic arch.    Calcification with mild narrowing at the origin of the vertebral arteries from the subclavian arteries.    Marked calcification along the common carotid arteries carotid bifurcations and extending along the cervical ICA.  Mild narrowing by NASCET criteria at the carotid bifurcations right greater than left without significant stenosis bilaterally.  Question fibromuscular dysplasia of the cervical ICA bilaterally.    Intracranially there is mild to moderate narrowing of the cavernous/petrous ICA.  No major branch stenosis/occlusion is identified at the [id number].    The  venous sinuses are patent.    No soft tissue mass is identified in the neck.    Ground-glass opacities at the lung mid lung zones bilaterally nonspecific.    These findings were communicated to Dr. Myles at 22:11 on 05/28/2024.                                       Medications   iohexoL (OMNIPAQUE 350) injection 75 mL (75 mLs Intravenous Given 5/28/24 2123)     Medical Decision Making  Amount and/or Complexity of Data Reviewed  Labs: ordered. Decision-making details documented in ED Course.  Radiology: ordered and independent interpretation performed.  ECG/medicine tests: ordered and independent interpretation performed. Decision-making details documented in ED Course.    Risk  Prescription drug management.                     EKG:  Rate 97  NSR  No STEMI                   59 y/o F here with AMS.  Quick onset that was recent, stroke code called.  Neuro bedside, listed allergy to contrast. Spouse gives go ahead with contrast, pt is on HD as is already.  EKG no STEMI.  CTA brain with no acute findings. Neuro does not rec MRI, they agree with getting an EEG.  Labs with creat at baseline, normal WBC.  Pt remains encephalopathic on my assessment, EMR shows previous visits for this, similar admits in the past.  Admitted to , family agreeable with plan.       Clinical Impression:  Final diagnoses:  [R41.82] Altered mental status  [R29.818] Acute focal neurological deficit                 Abram Myles MD  05/29/24 0133

## 2024-05-29 NOTE — ED NOTES
Pt initially failed quesada, was not following commands. Leticia CRAFT notified. Pt then became more awake and following commands, discussed with PA not giving her oral meds tonight as a precaution

## 2024-05-29 NOTE — ASSESSMENT & PLAN NOTE
Patient is identified as having Combined Systolic and Diastolic heart failure that is Chronic. Patient is off CHF pathway.CHF is currently controlled. Continue Beta Blocker and Nitrate/Vasodilator. Volume management with HD.  - Place on fluid restriction of 1.5 L. Continue to stress to patient importance of self efficacy and  on diet for CHF.   - Monitor clinical status closely. Monitor on telemetry.   - Monitor strict Is&Os and daily weights.    Results for orders placed during the hospital encounter of 05/07/24    Echo    Interpretation Summary    Left Ventricle: The left ventricle is mildly dilated. Ventricular mass is normal. Normal wall thickness. Severe global hypokinesis present. There is severely reduced systolic function. Ejection fraction by visual approximation is 15%. There is diastolic dysfunction.    Right Ventricle: Severe right ventricular enlargement. Wall thickness is normal. Right ventricle wall motion has global hypokinesis. Systolic function is mildly reduced.    Left Atrium: Left atrium is severely dilated. There is an atrial septal aneurysm.    Right Atrium: Right atrium is dilated.    Aortic Valve: There is mild aortic regurgitation.    Mitral Valve: There is mild regurgitation.    Tricuspid Valve: There is annular dilation present. There is normal leaflet mobility. There is severe functional regurgitation.    Pulmonary Artery: The estimated pulmonary artery systolic pressure is at least 24 mmHg. PASP is likely under-estimated in the setting of severe tricuspid regurgitation.    IVC/SVC: Central venous pressure of at least 8 mmHg.    No vegetation seen.

## 2024-05-29 NOTE — H&P
[facility] - Emergency Dept  [facility] Medicine  History & Physical    Patient Name: Suyapa Connelly  MRN: [id number]  Patient Class: OP- Observation  Admission Date: 5/28/2024  Attending Physician: Frances Bee MD   Primary Care Provider: Magen Christensen MD         Patient information was obtained from patient, spouse/SO, past medical records, and ER records.     Subjective:     Principal Problem:Acute encephalopathy    Chief Complaint:   Chief Complaint   Patient presents with    Altered Mental Status     Pt's  reports that pt's eyes rolled back, she became unresponsive, and defecated on herself, and is now altered,  reports she is AAOx4 at baseline, pt is awake and alert in triage, but not responding to any questions        HPI: Suyapa Connelly is a 58 y.o. female with PMHx of b/l AKAs, ESRD on HD, CAD s/p CABG, afib on eliquis, HTN here with AMS.  at bedside assists with history. He reports patient was in her normal state of health until yesterday afternoon when they were driving and stopped at a gas station. She suddenly became rigid with her arms extending outwards and her head back with her eyes rolling backwards then suddenly relaxed and had a large bowel movement. Since this episode she has been confused and altered with abnormal speech and generalized weakness.  has never witnessed a similar episode. She skipped HD Monday, last full session Friday. History from patient limited. She follows some commands and is tearful ad reports she is afraid of being in the hospital, does not answer questions.    ED: HR in 90s and briefly hypertensive but otherwise HDS and afebrile. K 5.2. bicarb 20. Glucose 264. BUN 74. Cr 5.8. Albumin 1.1. T bili 1.1. . Hbg 11.3. She was a stroke code in the ED with NIHSS 5 and had CTA without acute intracranial process. Vascular neurology felt symptoms were stroke mimic/post ictal and recommended EEG/admission. She continue to be encephalopathic  in ED and was admitted to .     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    Osteomyelitis     Osteomyelitis of left foot 04/30/2021    Ulcer of left foot     Vaginal delivery     x1       Past Surgical History:   Procedure Laterality Date    ABOVE-KNEE AMPUTATION Left 5/18/2021    Procedure: AMPUTATION, ABOVE KNEE;  Surgeon: Teddy Huber MD;  Location: [facility] OR [address] [place] [address];  Service: Vascular;  Laterality: Left;    ABOVE-KNEE AMPUTATION Right 3/18/2022    Procedure: AMPUTATION, ABOVE KNEE;  Surgeon: DAYNE Florez II, MD;  Location: [facility] OR [address] [place] [address];  Service: Vascular;  Laterality: Right;    Angiogram - Right Extremity Right 7/9/15    angiogram-left leg  10/6/15    ANGIOGRAPHY OF LOWER EXTREMITY Left 4/29/2021    Procedure: ANGIOGRAM, LOWER EXTREMITY;  Surgeon: Teddy Huber MD;  Location: [facility] OR [address] [place] [address];  Service: Vascular;  Laterality: Left;    BELOW KNEE AMPUTATION OF LOWER EXTREMITY Right 12/28/2021    Procedure: AMPUTATION, BELOW KNEE;  Surgeon: Kaitlyn Rojas MD;  Location: [facility] OR;  Service: General;  Laterality: Right;    CATHETERIZATION OF BOTH LEFT AND RIGHT HEART N/A 12/18/2019    Procedure: CATHETERIZATION, HEART, BOTH LEFT AND RIGHT;  Surgeon: Que Fernando III, MD;  Location: [facility] CATH LAB;  Service: Cardiology;  Laterality: N/A;    CORONARY ANGIOGRAPHY N/A 12/18/2019    Procedure: ANGIOGRAM, CORONARY ARTERY;  Surgeon: Que Fernando III, MD;  Location: [facility] CATH LAB;  Service: Cardiology;  Laterality: N/A;    CORONARY ANGIOGRAPHY INCLUDING BYPASS GRAFTS WITH CATHETERIZATION OF LEFT HEART N/A 7/28/2020    Procedure: ANGIOGRAM, CORONARY, INCLUDING BYPASS GRAFT, WITH LEFT HEART CATHETERIZATION, 9 am;   Surgeon: Rachel Easley MD;  Location: [facility] CATH LAB;  Service: Cardiology;  Laterality: N/A;    CORONARY ARTERY BYPASS GRAFT (CABG) N/A 1/14/2020    Procedure: CORONARY ARTERY BYPASS GRAFT (CABG) x 1     Off Pump;  Surgeon: Huang Altamirano MD;  Location: [facility] OR [address] [place] [address];  Service: Cardiovascular;  Laterality: N/A;    CREATION OF FEMORAL-TIBIAL ARTERY BYPASS Left 4/29/2021    Procedure: CREATION, BYPASS, ARTERIAL, FEMORAL TO ANTERIOR TIBIAL;  Surgeon: Teddy Huber MD;  Location: [facility] OR [address] [place] [address];  Service: Vascular;  Laterality: Left;    CREATION OF FEMOROPOPLITEAL ARTERIAL BYPASS USING GRAFT Left 8/18/2020    Procedure: CREATION, BYPASS, ARTERIAL, FEMORAL TO POPLITEAL, USING GRAFT, LEFT LOWER EXTREMITY;  Surgeon: Teddy Huber MD;  Location: [facility] OR;  Service: Vascular;  Laterality: Left;  REQUEST 7:15 A.M. START----COVID NEGATIVE ON 8/17 1ST CASE STARTE PER LEANA ON 8/7/2020 @ 942AM-  RN PREOP 8/12/2020   T/S-----CLEARED BY CARDS-------PENDING INSURANCE    DEBRIDEMENT OF FOOT Left 9/8/2020    Procedure: DEBRIDEMENT, FOOT;  Surgeon: Rosio Mayes DPM;  Location: [facility];  Service: Podiatry;  Laterality: Left;  request neoxx .   RN Pre Op 9-4-2020, Covid negative on 9/5/20. C A    DEBRIDEMENT OF FOOT  3/4/2021    Procedure: DEBRIDEMENT, FOOT;  Surgeon: Teddy Huber MD;  Location: [facility] OR;  Service: Vascular;;    DEBRIDEMENT OF FOOT Left 3/9/2021    Procedure: DEBRIDEMENT, FOOT, bone biopsy;  Surgeon: Rosio Mayes DPM;  Location: [facility] OR;  Service: Podiatry;  Laterality: Left;  Request neoxx---COVID IN AM  REQUESTING NOON START  RN Phone Pre op.On Blood thinners Plavix and Eliquis.  Covid am of surgery. C A    DEBRIDEMENT OF FOOT Left 5/4/2021    Procedure: DEBRIDEMENT, FOOT;  Surgeon: Farooq Morley DPM;  Location: [address];  Service: Podiatry;  Laterality: Left;    INSERTION OF TUNNELED CENTRAL VENOUS HEMODIALYSIS CATHETER N/A 1/27/2020    Procedure: Insertion,  Catheter, Central Venous, Hemodialysis;  Surgeon: ESTEBAN Gomez III, MD;  Location: [facility] CATH LAB;  Service: Peripheral Vascular;  Laterality: N/A;    INSERTION OF TUNNELED CENTRAL VENOUS HEMODIALYSIS CATHETER  5/10/2023    Procedure: Insertion, Catheter, Central Venous, Hemodialysis;  Surgeon: Romulo Queen MD;  Location: [facility] CATH LAB;  Service: Cardiology;;    PERCUTANEOUS TRANSLUMINAL ANGIOPLASTY N/A 3/4/2021    Procedure: PTA (ANGIOPLASTY, PERCUTANEOUS, TRANSLUMINAL);  Surgeon: Teddy Huber MD;  Location: [facility] OR;  Service: Vascular;  Laterality: N/A;    REMOVAL OF ARTERIOVENOUS GRAFT Left 5/27/2021    Procedure: REMOVAL, GRAFT, LEFT LOWER EXTREMITY, WOUND EXPLORATION;  Surgeon: Teddy Huber MD;  Location: [facility] 2ND FLR;  Service: Vascular;  Laterality: Left;    REMOVAL OF NAIL OF DIGIT Left 3/9/2021    Procedure: REMOVAL, NAIL, DIGIT;  Surgeon: Rosio Mayes DPM;  Location: [facility] OR;  Service: Podiatry;  Laterality: Left;    RIGHT HEART CATHETERIZATION Right 5/10/2023    Procedure: INSERTION, CATHETER, RIGHT HEART;  Surgeon: Romulo Queen MD;  Location: [facility] CATH LAB;  Service: Cardiology;  Laterality: Right;    THROMBECTOMY Left 3/4/2021    Procedure: THROMBECTOMY, LEFT LOWER EXTREMITY BYPASS GRAFT, ANGIOGRAM, POSSIBLE INTERVENTION, POSSIBLE LEFT LOWER EXTREMITY BYPASS;  Surgeon: Teddy Huber MD;  Location: [facility] OR;  Service: Vascular;  Laterality: Left;    THROMBECTOMY Left 4/29/2021    Procedure: GRAFT THROMBECTOMY, LEFT LOWER EXTREMITY;  Surgeon: Teddy Huber MD;  Location: [facility] OR 2ND FLR;  Service: Vascular;  Laterality: Left;  14.5 min  1179.85 mGy  341.01 Gycm2  240 ml dye    THROMBECTOMY  10/22/2021    Procedure: THROMBECTOMY;  Surgeon: Saad Arenas MD;  Location: [facility] CATH LAB/EP;  Service: Cardiology;;       Review of patient's allergies indicates:   Allergen Reactions    Ciprofloxacin Itching    Iodine      Kidney injury    Pcn [penicillins]       "Rash; tolerated ceftriaxone on 1/13/20       No current facility-administered medications on file prior to encounter.     Current Outpatient Medications on File Prior to Encounter   Medication Sig    acetaminophen (TYLENOL) 500 MG tablet Take 500 mg by mouth every 6 (six) hours as needed for Pain.    allopurinoL (ZYLOPRIM) 100 MG tablet Take 0.5 tablets (50 mg total) by mouth every other day.    apixaban (ELIQUIS) 5 mg Tab Take 1 tablet (5 mg total) by mouth 2 (two) times daily.    atorvastatin (LIPITOR) 80 MG tablet Take 1 tablet (80 mg total) by mouth once daily.    blood sugar diagnostic Strp Use to check blood glucose 2 (two) times a day.    calcitRIOL (ROCALTROL) 0.5 MCG Cap Take 1 capsule (0.5 mcg total) by mouth once daily.    calcium acetate,phosphat bind, (PHOSLO) 667 mg capsule Take 1 capsule (667 mg total) by mouth 3 (three) times daily with meals.    carvediloL (COREG) 6.25 MG tablet Take 1 tablet (6.25 mg total) by mouth 2 (two) times daily.    clopidogreL (PLAVIX) 75 mg tablet Take 1 tablet (75 mg total) by mouth once daily.    famotidine (PEPCID) 20 MG tablet Take 1 tablet (20 mg total) by mouth 2 (two) times daily.    hydrALAZINE (APRESOLINE) 50 MG tablet Take 1 tablet (50 mg total) by mouth 3 (three) times daily.    insulin aspart, niacinamide, (FIASP FLEXTOUCH U-100 INSULIN) 100 unit/mL (3 mL) InPn Inject 3 Units into the skin 3 (three) times daily with meals.    lancets Mis To check BG 3 times daily, to use with insurance preferred meter    multivitamin (ONE DAILY MULTIVITAMIN) per tablet Take 1 tablet by mouth once daily.    pen needle, diabetic 32 gauge x 5/32" Ndle 1 Units by Misc.(Non-Drug; Combo Route) route before meals as needed (SSI).    sertraline (ZOLOFT) 100 MG tablet Take 1 tablet (100 mg total) by mouth once daily.    sevelamer carbonate (RENVELA) 800 mg Tab Take 2 tablets (1,600 mg total) by mouth 3 (three) times daily.    sodium bicarbonate 650 MG tablet TAKE 1 TABLET(650 MG) BY " MOUTH THREE TIMES DAILY    traZODone (DESYREL) 50 MG tablet Take 0.5 tablets (25 mg total) by mouth every evening.    [DISCONTINUED] lancing device Misc 1 Device by Misc.(Non-Drug; Combo Route) route 2 (two) times daily with meals.     Family History       Problem Relation (Age of Onset)    Diabetes Mother, Father, Paternal Grandmother    Heart disease Maternal Grandmother    No Known Problems Maternal Grandfather, Paternal Grandfather          Tobacco Use    Smoking status: Former    Smokeless tobacco: Never   Substance and Sexual Activity    Alcohol use: No    Drug use: Yes     Types: Marijuana     Comment: occassional    Sexual activity: Yes     Partners: Male     Review of Systems   Unable to perform ROS: Mental status change   Neurological:         AMS, seizure like activity per      Objective:     Vital Signs (Most Recent):  Temp: 98.5 °F (36.9 °C) (05/29/24 0105)  Pulse: 91 (05/29/24 0105)  Resp: 16 (05/29/24 0105)  BP: (!) 127/92 (05/29/24 0105)  SpO2: 97 % (05/29/24 0105) Vital Signs (24h Range):  Temp:  [98.5 °F (36.9 °C)-98.8 °F (37.1 °C)] 98.5 °F (36.9 °C)  Pulse:  [] 91  Resp:  [4-20] 16  SpO2:  [95 %-100 %] 97 %  BP: (127-198)/() 127/92     Weight: 68.9 kg (152 lb)  Body mass index is 46.38 kg/m².     Physical Exam  Vitals and nursing note reviewed.   Constitutional:       General: She is not in acute distress.     Comments: Alert but minimally conversant dysarthric and tearful   HENT:      Head: Normocephalic and atraumatic.      Nose: Nose normal.      Mouth/Throat:      Pharynx: No oropharyngeal exudate.   Eyes:      Extraocular Movements: Extraocular movements intact.   Cardiovascular:      Rate and Rhythm: Normal rate and regular rhythm.      Heart sounds: Normal heart sounds.   Pulmonary:      Effort: Pulmonary effort is normal. No respiratory distress.      Breath sounds: Normal breath sounds.   Abdominal:      General: Bowel sounds are normal. There is no distension.       "Palpations: Abdomen is soft.      Tenderness: There is no abdominal tenderness.   Musculoskeletal:      Cervical back: Normal range of motion and neck supple.      Comments: Bilateral AKA  No pitting edema   Skin:     General: Skin is warm and dry.   Neurological:      Mental Status: She is alert.      Motor: Weakness present.      Comments: Oriented to self. Disoriented to , place, situation, time  Dysarthric and aphasic   Follows some commands  Able to sit herself up in bed, equal  strength bilaterally but generally weak    Psychiatric:         Mood and Affect: Affect is tearful.                Significant Labs: All pertinent labs within the past 24 hours have been reviewed.  Blood Culture: No results for input(s): "LABBLOO" in the last 48 hours.  BMP:   Recent Labs   Lab 05/28/24 2149   *      K 5.2*      CO2 20*   BUN 74*   CREATININE 5.8*   CALCIUM 8.8     CBC:   Recent Labs   Lab 05/28/24 2149   WBC 5.49   HGB 11.3*   HCT 33.6*        CMP:   Recent Labs   Lab 05/28/24 2149      K 5.2*      CO2 20*   *   BUN 74*   CREATININE 5.8*   CALCIUM 8.8   PROT 8.8*   ALBUMIN 2.9*   BILITOT 1.1*   ALKPHOS 221*   AST 35   ALT 17   ANIONGAP 15     Coagulation:   Recent Labs   Lab 05/28/24 2156   INR 1.4*     Lipid Panel:   Recent Labs   Lab 05/28/24 2149   CHOL 157   HDL 48   LDLCALC 82.4   TRIG 133   CHOLHDL 30.6     POCT Glucose:   Recent Labs   Lab 05/28/24 2142   POCTGLUCOSE 284*     TSH:   Recent Labs   Lab 05/28/24 2149   TSH 3.133       Significant Imaging: I have reviewed all pertinent imaging results/findings within the past 24 hours.  CTA STROKE MULTI-PHASE  Narrative: EXAMINATION:  CTA STROKE MULTI-PHASE    CLINICAL HISTORY:  Neuro deficit, acute, stroke suspected;    TECHNIQUE:  Non contrast low dose axial images were obtained thought the head. CT angiogram was performed from the level of the mireya to the top of the head following the IV " administration of 75mL of Omnipaque 350.   Sagittal and coronal reconstructions and maximum intensity projection reconstructions were performed. Arterial stenosis percentages are based on NASCET measurement criteria.  Two additional phases of immediate post-contrast CTA images were performed through the head alone.    3D reformats were created on an independent workstation to evaluate the [id number].    COMPARISON:  05/07/2024    FINDINGS:  Widening of the ventricles presumably reflecting volume loss.    No evidence of intracranial hemorrhage mass effect or acute territorial infarct.  Decreased attenuation within the periventricular white matter is nonspecific but may reflect prominent chronic small vessel ischemic change versus other leukoencephalopathy.  Dystrophic calcification left frontal white matter again noted.  Small lipoma of the corpus callosum.    CTA:    No advanced stenosis at the origin of the vessels from the aortic arch.    Calcification with mild narrowing at the origin of the vertebral arteries from the subclavian arteries.    Marked calcification along the common carotid arteries carotid bifurcations and extending along the cervical ICA.  Mild narrowing by NASCET criteria at the carotid bifurcations right greater than left without significant stenosis bilaterally.  Question fibromuscular dysplasia of the cervical ICA bilaterally.    Intracranially there is mild to moderate narrowing of the cavernous/petrous ICA.  No major branch stenosis/occlusion is identified at the [id number]-[id number].    The venous sinuses are patent.    No soft tissue mass is identified in the neck.    Ground-glass opacities at the lung mid lung zones bilaterally nonspecific.    These findings were communicated to Dr. Myles at 22:11 on 05/28/2024.  Impression: No acute intracranial process.  Presumed volume loss with enlargement of the ventricles similar to prior studies.    Extensive calcification but no significant  stenosis at the carotid bifurcations with mild narrowing right greater than left.  Mild narrowing at the origin of vertebral arteries bilaterally.  Question element of fibromuscular dysplasia of the cervical ICA bilaterally.    Mild to moderate stenosis of the cavernous/supraclinoid ICA.  No major branch occlusion at the gyvzcw-ty-Ducphe.    Electronically signed by: Wan Pagan  Date:    05/28/2024  Time:    22:11     Assessment/Plan:     * Acute encephalopathy  58F presenting after an episode of full body rigidity, bowel incontinence, and now persistent confusion. Se was a stroke code on arrival to ED, CTA with no acute LVO, vascular neurology recommending admission/EEG. No seizure history.  - Afebrile and HDS on admission  - Elevated BUN on admission, nephrology consult for HD  - EEG ordered  - Seizure, aspiration, fall precautions  - Neuro checks q4h  - SLP consult if remains encephalopathic/does not pass quesada  - UDS negative  - Check ammonia level, UA  - Consider neurology consult pending EEG results     Severe obesity (BMI >= 40)  Body mass index is 46.38 kg/m². Morbid obesity complicates all aspects of disease management from diagnostic modalities to treatment.    ESRD (end stage renal disease)  - HD MWF, missed Monday  - Nephrology consulted for HD    S/P AKA (above knee amputation) bilateral  - Noted. Fall precautions.    Chronic combined systolic and diastolic heart failure  Patient is identified as having Combined Systolic and Diastolic heart failure that is Chronic. Patient is off CHF pathway.CHF is currently controlled. Continue Beta Blocker and Nitrate/Vasodilator. Volume management with HD.  - Place on fluid restriction of 1.5 L. Continue to stress to patient importance of self efficacy and  on diet for CHF.   - Monitor clinical status closely. Monitor on telemetry.   - Monitor strict Is&Os and daily weights.    Results for orders placed during the hospital encounter of  05/07/24    Echo    Interpretation Summary    Left Ventricle: The left ventricle is mildly dilated. Ventricular mass is normal. Normal wall thickness. Severe global hypokinesis present. There is severely reduced systolic function. Ejection fraction by visual approximation is 15%. There is diastolic dysfunction.    Right Ventricle: Severe right ventricular enlargement. Wall thickness is normal. Right ventricle wall motion has global hypokinesis. Systolic function is mildly reduced.    Left Atrium: Left atrium is severely dilated. There is an atrial septal aneurysm.    Right Atrium: Right atrium is dilated.    Aortic Valve: There is mild aortic regurgitation.    Mitral Valve: There is mild regurgitation.    Tricuspid Valve: There is annular dilation present. There is normal leaflet mobility. There is severe functional regurgitation.    Pulmonary Artery: The estimated pulmonary artery systolic pressure is at least 24 mmHg. PASP is likely under-estimated in the setting of severe tricuspid regurgitation.    IVC/SVC: Central venous pressure of at least 8 mmHg.    No vegetation seen.       Paroxysmal atrial fibrillation  Patient with Paroxysmal (<7 days) atrial fibrillation which is controlled currently with Beta Blocker. Patient is currently in sinus rhythm.[id number] Score: 3. Anticoagulation indicated. Anticoagulation done with eliquis .    Anemia due to chronic kidney disease, on chronic dialysis  Patient's anemia is currently controlled. Has not received any PRBCs to date. Etiology likely d/t chronic disease due to ESRD  Current CBC reviewed-   Lab Results   Component Value Date    HGB 11.3 (L) 05/28/2024    HCT 33.6 (L) 05/28/2024     Monitor serial CBC and transfuse if patient becomes hemodynamically unstable, symptomatic or H/H drops below 7/21.    CAD (coronary artery disease)  Patient with known CAD s/p CABG, which is controlled Will continue Plavix and Statin and monitor for S/Sx of angina/ACS. Continue to  monitor on telemetry.     Type 2 diabetes mellitus with hyperglycemia, with long-term current use of insulin  Patient's FSGs are uncontrolled due to hyperglycemia on current medication regimen.  Last A1c reviewed-   Lab Results   Component Value Date    HGBA1C 8.0 (H) 04/24/2024     Most recent fingerstick glucose reviewed-   Recent Labs   Lab 05/28/24 2142   POCTGLUCOSE 284*     Current correctional scale  Low  Maintain anti-hyperglycemic dose as follows-   Antihyperglycemics (From admission, onward)      Start     Stop Route Frequency Ordered    05/29/24 0101  insulin aspart U-100 pen 0-5 Units         -- SubQ Before meals & nightly PRN 05/29/24 0001        Hold Oral hypoglycemics while patient is in the hospital.   DM diet when passes quesada    CAROLIN (generalized anxiety disorder)  - Continue home sertraline       VTE Risk Mitigation (From admission, onward)           Ordered     apixaban tablet 5 mg  2 times daily         05/29/24 0036     Reason for No Pharmacological VTE Prophylaxis  Once        Question:  Reasons:  Answer:  Already adequately anticoagulated on oral Anticoagulants    05/29/24 0001     IP VTE HIGH RISK PATIENT  Once         05/29/24 0001     Reason for no Mechanical VTE Prophylaxis  Once        Comments: Bilateral AKA, on eliquis   Question:  Reasons:  Answer:  Physician Provided (leave comment)    05/29/24 0001                         On 05/29/2024, patient should be placed in hospital observation services under my care in collaboration with DO. Leticia Martinez PA-C  Department of Hospital Medicine  [facility]

## 2024-05-29 NOTE — HPI
57 y/o F with PMHx of CAD s/p CABG, A-fib (on eliquis), B/L AKA, ESRD (on dialysis), obesity. Patient was found unresponsive at home by her  who called EMS. Upon EMS arrival, patient was awake but not following commands. On arrival to the ED, patient was able to state name but had periods of confusion. Unknown LKN. Stroke code activated.

## 2024-05-29 NOTE — SUBJECTIVE & OBJECTIVE
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    Osteomyelitis     Osteomyelitis of left foot 04/30/2021    Ulcer of left foot     Vaginal delivery     x1       Past Surgical History:   Procedure Laterality Date    ABOVE-KNEE AMPUTATION Left 5/18/2021    Procedure: AMPUTATION, ABOVE KNEE;  Surgeon: Teddy Huber MD;  Location: [facility] OR [address] [place] [address];  Service: Vascular;  Laterality: Left;    ABOVE-KNEE AMPUTATION Right 3/18/2022    Procedure: AMPUTATION, ABOVE KNEE;  Surgeon: DAYNE Florez II, MD;  Location: [facility] OR [address] [place] [address];  Service: Vascular;  Laterality: Right;    Angiogram - Right Extremity Right 7/9/15    angiogram-left leg  10/6/15    ANGIOGRAPHY OF LOWER EXTREMITY Left 4/29/2021    Procedure: ANGIOGRAM, LOWER EXTREMITY;  Surgeon: Teddy Huber MD;  Location: [facility] OR [address] [place] [address];  Service: Vascular;  Laterality: Left;    BELOW KNEE AMPUTATION OF LOWER EXTREMITY Right 12/28/2021    Procedure: AMPUTATION, BELOW KNEE;  Surgeon: Kaitlyn Rojas MD;  Location: [facility] OR;  Service: General;  Laterality: Right;    CATHETERIZATION OF BOTH LEFT AND RIGHT HEART N/A 12/18/2019    Procedure: CATHETERIZATION, HEART, BOTH LEFT AND RIGHT;  Surgeon: Que Fernando III, MD;  Location: [facility] CATH LAB;  Service: Cardiology;  Laterality: N/A;    CORONARY ANGIOGRAPHY N/A 12/18/2019    Procedure: ANGIOGRAM, CORONARY ARTERY;  Surgeon: Que Fernando III, MD;  Location: [facility] CATH LAB;  Service: Cardiology;  Laterality: N/A;    CORONARY ANGIOGRAPHY INCLUDING BYPASS GRAFTS WITH CATHETERIZATION OF LEFT HEART N/A 7/28/2020    Procedure: ANGIOGRAM, CORONARY, INCLUDING BYPASS GRAFT, WITH LEFT HEART CATHETERIZATION, 9 am;  Surgeon: Rachel Easley MD;   Location: [facility] CATH LAB;  Service: Cardiology;  Laterality: N/A;    CORONARY ARTERY BYPASS GRAFT (CABG) N/A 1/14/2020    Procedure: CORONARY ARTERY BYPASS GRAFT (CABG) x 1     Off Pump;  Surgeon: Huang Altamirano MD;  Location: [facility] OR [address] [place] [address];  Service: Cardiovascular;  Laterality: N/A;    CREATION OF FEMORAL-TIBIAL ARTERY BYPASS Left 4/29/2021    Procedure: CREATION, BYPASS, ARTERIAL, FEMORAL TO ANTERIOR TIBIAL;  Surgeon: Teddy Huber MD;  Location: [facility] OR [address] [place] [address];  Service: Vascular;  Laterality: Left;    CREATION OF FEMOROPOPLITEAL ARTERIAL BYPASS USING GRAFT Left 8/18/2020    Procedure: CREATION, BYPASS, ARTERIAL, FEMORAL TO POPLITEAL, USING GRAFT, LEFT LOWER EXTREMITY;  Surgeon: Teddy Huber MD;  Location: [facility];  Service: Vascular;  Laterality: Left;  REQUEST 7:15 A.M. START----COVID NEGATIVE ON 8/17 1ST CASE STARTE PER LEANA ON 8/7/2020 @ 942AM-  RN PREOP 8/12/2020   T/S-----CLEARED BY CARDS-------PENDING INSURANCE    DEBRIDEMENT OF FOOT Left 9/8/2020    Procedure: DEBRIDEMENT, FOOT;  Surgeon: Rosio Mayes DPM;  Location: [facility];  Service: Podiatry;  Laterality: Left;  request neoxx .   RN Pre Op 9-4-2020, Covid negative on 9/5/20. C A    DEBRIDEMENT OF FOOT  3/4/2021    Procedure: DEBRIDEMENT, FOOT;  Surgeon: Teddy Huber MD;  Location: [facility] OR;  Service: Vascular;;    DEBRIDEMENT OF FOOT Left 3/9/2021    Procedure: DEBRIDEMENT, FOOT, bone biopsy;  Surgeon: Rosio Mayes DPM;  Location: [facility] OR;  Service: Podiatry;  Laterality: Left;  Request neoxx---COVID IN AM  REQUESTING NOON START  RN Phone Pre op.On Blood thinners Plavix and Eliquis.  Covid am of surgery. C A    DEBRIDEMENT OF FOOT Left 5/4/2021    Procedure: DEBRIDEMENT, FOOT;  Surgeon: Farooq Morley DPM;  Location: [address];  Service: Podiatry;  Laterality: Left;    INSERTION OF TUNNELED CENTRAL VENOUS HEMODIALYSIS CATHETER N/A 1/27/2020    Procedure: Insertion, Catheter, Central Venous,  Hemodialysis;  Surgeon: ESTEBAN Gomez III, MD;  Location: [facility] CATH LAB;  Service: Peripheral Vascular;  Laterality: N/A;    INSERTION OF TUNNELED CENTRAL VENOUS HEMODIALYSIS CATHETER  5/10/2023    Procedure: Insertion, Catheter, Central Venous, Hemodialysis;  Surgeon: Romulo Queen MD;  Location: [facility] CATH LAB;  Service: Cardiology;;    PERCUTANEOUS TRANSLUMINAL ANGIOPLASTY N/A 3/4/2021    Procedure: PTA (ANGIOPLASTY, PERCUTANEOUS, TRANSLUMINAL);  Surgeon: Teddy Huber MD;  Location: [facility] OR;  Service: Vascular;  Laterality: N/A;    REMOVAL OF ARTERIOVENOUS GRAFT Left 5/27/2021    Procedure: REMOVAL, GRAFT, LEFT LOWER EXTREMITY, WOUND EXPLORATION;  Surgeon: Teddy Huber MD;  Location: [facility] OR 2ND FLR;  Service: Vascular;  Laterality: Left;    REMOVAL OF NAIL OF DIGIT Left 3/9/2021    Procedure: REMOVAL, NAIL, DIGIT;  Surgeon: Rosio Mayes DPM;  Location: [facility] OR;  Service: Podiatry;  Laterality: Left;    RIGHT HEART CATHETERIZATION Right 5/10/2023    Procedure: INSERTION, CATHETER, RIGHT HEART;  Surgeon: Romulo Queen MD;  Location: [facility] CATH LAB;  Service: Cardiology;  Laterality: Right;    THROMBECTOMY Left 3/4/2021    Procedure: THROMBECTOMY, LEFT LOWER EXTREMITY BYPASS GRAFT, ANGIOGRAM, POSSIBLE INTERVENTION, POSSIBLE LEFT LOWER EXTREMITY BYPASS;  Surgeon: Teddy Huber MD;  Location: [facility] OR;  Service: Vascular;  Laterality: Left;    THROMBECTOMY Left 4/29/2021    Procedure: GRAFT THROMBECTOMY, LEFT LOWER EXTREMITY;  Surgeon: Teddy Huber MD;  Location: [facility] OR 2ND FLR;  Service: Vascular;  Laterality: Left;  14.5 min  1179.85 mGy  341.01 Gycm2  240 ml dye    THROMBECTOMY  10/22/2021    Procedure: THROMBECTOMY;  Surgeon: Saad Arenas MD;  Location: [facility] CATH LAB/EP;  Service: Cardiology;;       Review of patient's allergies indicates:   Allergen Reactions    Ciprofloxacin Itching    Iodine      Kidney injury    Pcn [penicillins]      Rash; tolerated ceftriaxone  "on 1/13/20       No current facility-administered medications on file prior to encounter.     Current Outpatient Medications on File Prior to Encounter   Medication Sig    acetaminophen (TYLENOL) 500 MG tablet Take 500 mg by mouth every 6 (six) hours as needed for Pain.    allopurinoL (ZYLOPRIM) 100 MG tablet Take 0.5 tablets (50 mg total) by mouth every other day.    apixaban (ELIQUIS) 5 mg Tab Take 1 tablet (5 mg total) by mouth 2 (two) times daily.    atorvastatin (LIPITOR) 80 MG tablet Take 1 tablet (80 mg total) by mouth once daily.    blood sugar diagnostic Strp Use to check blood glucose 2 (two) times a day.    calcitRIOL (ROCALTROL) 0.5 MCG Cap Take 1 capsule (0.5 mcg total) by mouth once daily.    calcium acetate,phosphat bind, (PHOSLO) 667 mg capsule Take 1 capsule (667 mg total) by mouth 3 (three) times daily with meals.    carvediloL (COREG) 6.25 MG tablet Take 1 tablet (6.25 mg total) by mouth 2 (two) times daily.    clopidogreL (PLAVIX) 75 mg tablet Take 1 tablet (75 mg total) by mouth once daily.    famotidine (PEPCID) 20 MG tablet Take 1 tablet (20 mg total) by mouth 2 (two) times daily.    hydrALAZINE (APRESOLINE) 50 MG tablet Take 1 tablet (50 mg total) by mouth 3 (three) times daily.    insulin aspart, niacinamide, (FIASP FLEXTOUCH U-100 INSULIN) 100 unit/mL (3 mL) InPn Inject 3 Units into the skin 3 (three) times daily with meals.    lancets Misc To check BG 3 times daily, to use with insurance preferred meter    multivitamin (ONE DAILY MULTIVITAMIN) per tablet Take 1 tablet by mouth once daily.    pen needle, diabetic 32 gauge x 5/32" Ndle 1 Units by Misc.(Non-Drug; Combo Route) route before meals as needed (SSI).    sertraline (ZOLOFT) 100 MG tablet Take 1 tablet (100 mg total) by mouth once daily.    sevelamer carbonate (RENVELA) 800 mg Tab Take 2 tablets (1,600 mg total) by mouth 3 (three) times daily.    sodium bicarbonate 650 MG tablet TAKE 1 TABLET(650 MG) BY MOUTH THREE TIMES DAILY    " traZODone (DESYREL) 50 MG tablet Take 0.5 tablets (25 mg total) by mouth every evening.    [DISCONTINUED] lancing device Misc 1 Device by Misc.(Non-Drug; Combo Route) route 2 (two) times daily with meals.     Family History       Problem Relation (Age of Onset)    Diabetes Mother, Father, Paternal Grandmother    Heart disease Maternal Grandmother    No Known Problems Maternal Grandfather, Paternal Grandfather          Tobacco Use    Smoking status: Former    Smokeless tobacco: Never   Substance and Sexual Activity    Alcohol use: No    Drug use: Yes     Types: Marijuana     Comment: occassional    Sexual activity: Yes     Partners: Male     Review of Systems   Unable to perform ROS: Mental status change   Neurological:         AMS, seizure like activity per      Objective:     Vital Signs (Most Recent):  Temp: 98.5 °F (36.9 °C) (05/29/24 0105)  Pulse: 91 (05/29/24 0105)  Resp: 16 (05/29/24 0105)  BP: (!) 127/92 (05/29/24 0105)  SpO2: 97 % (05/29/24 0105) Vital Signs (24h Range):  Temp:  [98.5 °F (36.9 °C)-98.8 °F (37.1 °C)] 98.5 °F (36.9 °C)  Pulse:  [] 91  Resp:  [4-20] 16  SpO2:  [95 %-100 %] 97 %  BP: (127-198)/() 127/92     Weight: 68.9 kg (152 lb)  Body mass index is 46.38 kg/m².     Physical Exam  Vitals and nursing note reviewed.   Constitutional:       General: She is not in acute distress.     Comments: Alert but minimally conversant dysarthric and tearful   HENT:      Head: Normocephalic and atraumatic.      Nose: Nose normal.      Mouth/Throat:      Pharynx: No oropharyngeal exudate.   Eyes:      Extraocular Movements: Extraocular movements intact.   Cardiovascular:      Rate and Rhythm: Normal rate and regular rhythm.      Heart sounds: Normal heart sounds.   Pulmonary:      Effort: Pulmonary effort is normal. No respiratory distress.      Breath sounds: Normal breath sounds.   Abdominal:      General: Bowel sounds are normal. There is no distension.      Palpations: Abdomen is soft.  "     Tenderness: There is no abdominal tenderness.   Musculoskeletal:      Cervical back: Normal range of motion and neck supple.      Comments: Bilateral AKA  No pitting edema   Skin:     General: Skin is warm and dry.   Neurological:      Mental Status: She is alert.      Motor: Weakness present.      Comments: Oriented to self. Disoriented to , place, situation, time  Dysarthric and aphasic   Follows some commands  Able to sit herself up in bed, equal  strength bilaterally but generally weak    Psychiatric:         Mood and Affect: Affect is tearful.                Significant Labs: All pertinent labs within the past 24 hours have been reviewed.  Blood Culture: No results for input(s): "LABBLOO" in the last 48 hours.  BMP:   Recent Labs   Lab 05/28/24 2149   *      K 5.2*      CO2 20*   BUN 74*   CREATININE 5.8*   CALCIUM 8.8     CBC:   Recent Labs   Lab 05/28/24 2149   WBC 5.49   HGB 11.3*   HCT 33.6*        CMP:   Recent Labs   Lab 05/28/24 2149      K 5.2*      CO2 20*   *   BUN 74*   CREATININE 5.8*   CALCIUM 8.8   PROT 8.8*   ALBUMIN 2.9*   BILITOT 1.1*   ALKPHOS 221*   AST 35   ALT 17   ANIONGAP 15     Coagulation:   Recent Labs   Lab 05/28/24 2156   INR 1.4*     Lipid Panel:   Recent Labs   Lab 05/28/24 2149   CHOL 157   HDL 48   LDLCALC 82.4   TRIG 133   CHOLHDL 30.6     POCT Glucose:   Recent Labs   Lab 05/28/24 2142   POCTGLUCOSE 284*     TSH:   Recent Labs   Lab 05/28/24 2149   TSH 3.133       Significant Imaging: I have reviewed all pertinent imaging results/findings within the past 24 hours.  CTA STROKE MULTI-PHASE  Narrative: EXAMINATION:  CTA STROKE MULTI-PHASE    CLINICAL HISTORY:  Neuro deficit, acute, stroke suspected;    TECHNIQUE:  Non contrast low dose axial images were obtained thought the head. CT angiogram was performed from the level of the mireya to the top of the head following the IV administration of 75mL of Omnipaque 350. "   Sagittal and coronal reconstructions and maximum intensity projection reconstructions were performed. Arterial stenosis percentages are based on NASCET measurement criteria.  Two additional phases of immediate post-contrast CTA images were performed through the head alone.    3D reformats were created on an independent workstation to evaluate the [id number].    COMPARISON:  05/07/2024    FINDINGS:  Widening of the ventricles presumably reflecting volume loss.    No evidence of intracranial hemorrhage mass effect or acute territorial infarct.  Decreased attenuation within the periventricular white matter is nonspecific but may reflect prominent chronic small vessel ischemic change versus other leukoencephalopathy.  Dystrophic calcification left frontal white matter again noted.  Small lipoma of the corpus callosum.    CTA:    No advanced stenosis at the origin of the vessels from the aortic arch.    Calcification with mild narrowing at the origin of the vertebral arteries from the subclavian arteries.    Marked calcification along the common carotid arteries carotid bifurcations and extending along the cervical ICA.  Mild narrowing by NASCET criteria at the carotid bifurcations right greater than left without significant stenosis bilaterally.  Question fibromuscular dysplasia of the cervical ICA bilaterally.    Intracranially there is mild to moderate narrowing of the cavernous/petrous ICA.  No major branch stenosis/occlusion is identified at the [id number].    The venous sinuses are patent.    No soft tissue mass is identified in the neck.    Ground-glass opacities at the lung mid lung zones bilaterally nonspecific.    These findings were communicated to Dr. Myles at 22:11 on 05/28/2024.  Impression: No acute intracranial process.  Presumed volume loss with enlargement of the ventricles similar to prior studies.    Extensive calcification but no significant stenosis at the carotid bifurcations with  mild narrowing right greater than left.  Mild narrowing at the origin of vertebral arteries bilaterally.  Question element of fibromuscular dysplasia of the cervical ICA bilaterally.    Mild to moderate stenosis of the cavernous/supraclinoid ICA.  No major branch occlusion at the [id number].    Electronically signed by: Wan Pagan  Date:    05/28/2024  Time:    22:11

## 2024-05-29 NOTE — ASSESSMENT & PLAN NOTE
58F presenting after an episode of full body rigidity, bowel incontinence, and now persistent confusion. Se was a stroke code on arrival to ED, CTA with no acute LVO, vascular neurology recommending admission/EEG. No seizure history.  - Afebrile and HDS on admission  - Elevated BUN on admission, nephrology consult for HD  - EEG ordered  - Seizure, aspiration, fall precautions  - Neuro checks q4h  - SLP consult if remains encephalopathic/does not pass quesada  - UDS negative  - Check ammonia level, UA  - Consider neurology consult pending EEG results

## 2024-05-29 NOTE — CONSULTS
[facility] - Emergency Dept  Vascular Neurology  [facility]  Consult Note    Inpatient consult to Vascular (Stroke) Neurology  Consult performed by: Hanh Patel DNP  Consult ordered by: Abram Myles MD  Reason for consult: Confusion        Assessment/Plan:     Patient is a 58 y.o. year old female with:    Confusion  59 y/o F with PMHx of CAD s/p CABG, A-fib (on eliquis), B/L AKA, ESRD (on dialysis), obesity. Patient was found unresponsive at home by her  around 2000, who called EMS. Upon EMS arrival, patient was awake but not following commands. On arrival to the ED, patient was able to state name but had periods of confusion. Unknown LKN. Stroke code activated.     -NIHSS 5 for confusion, aphasia/dysarthria and bilateral upper extremities drift   -CTA Stroke MP official radiology read unavailable but reviewed by stroke on-call attending, no acute intracranial process and no large vessel occlusion.   -Patient not a candidate for acute intervention at this time. No TNK based on eliquis use. No thrombectomy as no acute LVO on CTA and symptoms not concerning for LVO at this time. VAN negative.  -High suspicion for stroke mimic/post-ictal. No need for MRI Brain at this time, low suspicion for acute stroke due to history and assessment/presentation, however, primary team can obtain MRI Brain if they suspect acute stroke.   Recommend EEG to rule out any seizure.   -Case discussed with on-call attending    Thank you for including us in the care of this patient.  Vascular Neurology will sign off.  If the patient develops new symptoms concerning for acute stroke, please do not hesitate to re-consult.        STROKE DOCUMENTATION     Acute Stroke Times   Last Known Normal Date: 05/28/24  Last Known Normal Time: 2011  Unknown Normal Time: Unknown Time  Stroke Team Called Date: 05/28/24  Stroke Team Called Time: 2112  Stroke Team Arrival Date: 05/28/24  Stroke Team Arrival Time: 2117  CT  Interpretation Time: 2122  Thrombolytic Therapy Recommended: No  CTA Interpretation Time: 2128  Thrombectomy Recommended: No    NIH Scale:  Interval: baseline  1a. Level of Consciousness: 0-->Alert, keenly responsive  1b. LOC Questions: 0-->Answers both questions correctly  1c. LOC Commands: 1-->Performs one task correctly  2. Best Gaze: 0-->Normal  3. Visual: 0-->No visual loss (Patient not following visual commands)  4. Facial Palsy: 0-->Normal symmetrical movements  5a. Motor Arm, Left: 1-->Drift, limb holds 90 (or 45) degrees, but drifts down before full 10 seconds, does not hit bed or other support  5b. Motor Arm, Right: 1-->Drift, limb holds 90 (or 45) degrees, but drifts down before full 10 secs, does not hit bed or other support  6a. Motor Leg, Left: (UN) Amputation or joint fusion  6b. Motor Leg, Right: (UN) Amputation or joint fusion  7. Limb Ataxia: (UN) Amputation or joint fusion  8. Sensory: 0-->Normal, no sensory loss  9. Best Language: 1-->Mild-to-moderate aphasia, some obvious loss of fluency or facility of comprehension, without significant limitation on ideas expressed or form of expression. Reduction of speech and/or comprehension, however, makes conversation. . . (see row details)  10. Dysarthria: 1-->Mild-to-moderate dysarthria, patient slurs at least some words and, at worst, can be understood with some difficulty  11. Extinction and Inattention (formerly Neglect): 0-->No abnormality  Total (NIH Stroke Scale): 5    Modified Brain Score: 1  Carlos Coma Scale:14   ABCD2 Score:    [id number] Score:3  HAS -BLED Score:   ICH Score:   Hunt & Tavares Classification:       Thrombolysis Candidate? No, Current use of direct thrombin inhibitors (dabigatran) or direct factor Xa inhibitors within 48 hours, Strong suspicion for stroke mimic or alternative diagnosis     Delays to Thrombolysis?  Not Applicable    Interventional Revascularization Candidate?   Is the patient eligible for mechanical endovascular  reperfusion (DAVID)?  No; No large vessel occlusion identified on imaging     Delays to Thrombectomy? Not Applicable    Hemorrhagic change of an Ischemic Stroke: Does this patient have an ischemic stroke with hemorrhagic changes? No     Subjective:     History of Present Illness:  59 y/o F with PMHx of CAD s/p CABG, A-fib (on eliquis), B/L AKA, ESRD (on dialysis), obesity. Patient was found unresponsive at home by her  who called EMS. Upon EMS arrival, patient was awake but not following commands. On arrival to the ED, patient was able to state name but had periods of confusion. Unknown LKN. Stroke code activated.           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    Osteomyelitis     Osteomyelitis of left foot 04/30/2021    Ulcer of left foot     Vaginal delivery     x1     Past Surgical History:   Procedure Laterality Date    ABOVE-KNEE AMPUTATION Left 5/18/2021    Procedure: AMPUTATION, ABOVE KNEE;  Surgeon: Teddy Huber MD;  Location: [address];  Service: Vascular;  Laterality: Left;    ABOVE-KNEE AMPUTATION Right 3/18/2022    Procedure: AMPUTATION, ABOVE KNEE;  Surgeon: DAYNE Florez II, MD;  Location: [address];  Service: Vascular;  Laterality: Right;    Angiogram - Right Extremity Right 7/9/15    angiogram-left leg  10/6/15    ANGIOGRAPHY OF LOWER EXTREMITY Left 4/29/2021    Procedure: ANGIOGRAM, LOWER EXTREMITY;  Surgeon: Teddy Huber MD;  Location: [address];  Service: Vascular;  Laterality: Left;    BELOW KNEE AMPUTATION OF LOWER EXTREMITY Right 12/28/2021    Procedure: AMPUTATION, BELOW KNEE;  Surgeon: Kaitlyn Rojas MD;  Location: [facility] OR;  Service: General;  Laterality: Right;     CATHETERIZATION OF BOTH LEFT AND RIGHT HEART N/A 12/18/2019    Procedure: CATHETERIZATION, HEART, BOTH LEFT AND RIGHT;  Surgeon: Que Fernando III, MD;  [facility];  Service: Cardiology;  Laterality: N/A;    CORONARY ANGIOGRAPHY N/A 12/18/2019    Procedure: ANGIOGRAM, CORONARY ARTERY;  Surgeon: Que Fernando III, MD;  [facility];  Service: Cardiology;  Laterality: N/A;    CORONARY ANGIOGRAPHY INCLUDING BYPASS GRAFTS WITH CATHETERIZATION OF LEFT HEART N/A 7/28/2020    Procedure: ANGIOGRAM, CORONARY, INCLUDING BYPASS GRAFT, WITH LEFT HEART CATHETERIZATION, 9 am;  Surgeon: Rachel Easley MD;  Location: [facility] CATH LAB;  Service: Cardiology;  Laterality: N/A;    CORONARY ARTERY BYPASS GRAFT (CABG) N/A 1/14/2020    Procedure: CORONARY ARTERY BYPASS GRAFT (CABG) x 1     Off Pump;  Surgeon: Huang Altamirano MD;  Location: [facility] OR [address] [place] [address];  Service: Cardiovascular;  Laterality: N/A;    CREATION OF FEMORAL-TIBIAL ARTERY BYPASS Left 4/29/2021    Procedure: CREATION, BYPASS, ARTERIAL, FEMORAL TO ANTERIOR TIBIAL;  Surgeon: Teddy Huber MD;  Location: [facility] OR [address] [place] [address];  Service: Vascular;  Laterality: Left;    CREATION OF FEMOROPOPLITEAL ARTERIAL BYPASS USING GRAFT Left 8/18/2020    Procedure: CREATION, BYPASS, ARTERIAL, FEMORAL TO POPLITEAL, USING GRAFT, LEFT LOWER EXTREMITY;  Surgeon: Teddy Huber MD;  Location: [facility];  Service: Vascular;  Laterality: Left;  REQUEST 7:15 A.M. START----COVID NEGATIVE ON 8/17  1ST CASE STARTE PER LEANA ON 8/7/2020 @ 942AM-LO  RN PREOP 8/12/2020   T/S-----CLEARED BY CARDS-------PENDING INSURANCE    DEBRIDEMENT OF FOOT Left 9/8/2020    Procedure: DEBRIDEMENT, FOOT;  Surgeon: Rosio Mayes DPM;  Location: [facility];  Service: Podiatry;  Laterality: Left;  request neoxx .   RN Pre Op 9-4-2020, Covid negative on 9/5/20. C A    DEBRIDEMENT OF FOOT  3/4/2021    Procedure: DEBRIDEMENT, FOOT;  Surgeon: Teddy Huber MD;  Location: [facility];   Service: Vascular;;    DEBRIDEMENT OF FOOT Left 3/9/2021    Procedure: DEBRIDEMENT, FOOT, bone biopsy;  Surgeon: Rosio Mayes DPM;  Location: [facility] OR;  Service: Podiatry;  Laterality: Left;  Request neoxx---COVID IN AM  REQUESTING NOON START  RN Phone Pre op.On Blood thinners Plavix and Eliquis.  Covid am of surgery. C A    DEBRIDEMENT OF FOOT Left 5/4/2021    Procedure: DEBRIDEMENT, FOOT;  Surgeon: Farooq Morley DPM;  [address]  Service: Podiatry;  Laterality: Left;    INSERTION OF TUNNELED CENTRAL VENOUS HEMODIALYSIS CATHETER N/A 1/27/2020    Procedure: Insertion, Catheter, Central Venous, Hemodialysis;  Surgeon: ESTEBAN Gomez III, MD;  Location: [facility] CATH LAB;  Service: Peripheral Vascular;  Laterality: N/A;    INSERTION OF TUNNELED CENTRAL VENOUS HEMODIALYSIS CATHETER  5/10/2023    Procedure: Insertion, Catheter, Central Venous, Hemodialysis;  Surgeon: Romulo Queen MD;  Location: [facility] CATH LAB;  Service: Cardiology;;    PERCUTANEOUS TRANSLUMINAL ANGIOPLASTY N/A 3/4/2021    Procedure: PTA (ANGIOPLASTY, PERCUTANEOUS, TRANSLUMINAL);  Surgeon: Teddy Huber MD;  Location: [facility] OR;  Service: Vascular;  Laterality: N/A;    REMOVAL OF ARTERIOVENOUS GRAFT Left 5/27/2021    Procedure: REMOVAL, GRAFT, LEFT LOWER EXTREMITY, WOUND EXPLORATION;  Surgeon: Teddy Huber MD;  [address];  Service: Vascular;  Laterality: Left;    REMOVAL OF NAIL OF DIGIT Left 3/9/2021    Procedure: REMOVAL, NAIL, DIGIT;  Surgeon: Rosio Mayes DPM;  Location: [facility] OR;  Service: Podiatry;  Laterality: Left;    RIGHT HEART CATHETERIZATION Right 5/10/2023    Procedure: INSERTION, CATHETER, RIGHT HEART;  Surgeon: Romulo Queen MD;  Location: [facility] CATH LAB;  Service: Cardiology;  Laterality: Right;    THROMBECTOMY Left 3/4/2021    Procedure: THROMBECTOMY, LEFT LOWER EXTREMITY BYPASS GRAFT, ANGIOGRAM, POSSIBLE INTERVENTION, POSSIBLE LEFT LOWER EXTREMITY BYPASS;  Surgeon: Teddy BLANCA  MD Cecile;  Location: [facility] OR;  Service: Vascular;  Laterality: Left;    THROMBECTOMY Left 4/29/2021    Procedure: GRAFT THROMBECTOMY, LEFT LOWER EXTREMITY;  Surgeon: Teddy Huber MD;  [facility];  Service: Vascular;  Laterality: Left;  14.5 min  1179.85 mGy  341.01 Gycm2  240 ml dye    THROMBECTOMY  10/22/2021    Procedure: THROMBECTOMY;  Surgeon: Saad Arenas MD;  Location: [facility] CATH LAB/EP;  Service: Cardiology;;     Social History     Tobacco Use    Smoking status: Former    Smokeless tobacco: Never   Substance Use Topics    Alcohol use: No    Drug use: Yes     Types: Marijuana     Comment: occassional     Review of patient's allergies indicates:   Allergen Reactions    Ciprofloxacin Itching    Iodine      Kidney injury    Pcn [penicillins]      Rash; tolerated ceftriaxone on 1/13/20       Medications: I have reviewed the current medication administration record.    (Not in a hospital admission)      Review of Systems   Unable to perform ROS: Other (Patient only stating name and not following much commands or answering questions)     Objective:     Vital Signs (Most Recent):  Temp: 98.8 °F (37.1 °C) (05/28/24 2054)  Pulse: 100 (05/28/24 2153)  Resp: (!) 4 (05/28/24 2153)  BP: (!) 143/70 (05/28/24 2054)  SpO2: 96 % (05/28/24 2153)    Vital Signs Range (Last 24H):  Temp:  [98.8 °F (37.1 °C)]   Pulse:  []   Resp:  [4-20]   BP: (143)/(70)   SpO2:  [96 %-100 %]        Physical Exam  Vitals and nursing note reviewed.   Constitutional:       General: She is not in acute distress.  HENT:      Head: Normocephalic.      Mouth/Throat:      Mouth: Mucous membranes are dry.   Cardiovascular:      Rate and Rhythm: Tachycardia present.   Pulmonary:      Effort: Pulmonary effort is normal. No respiratory distress.   Musculoskeletal:      Comments: B/L AKA    Neurological:      Mental Status: She is alert. She is disoriented.      Comments: Bilateral AKA               Neurological Exam:   LOC:  "alert  Attention Span: poor  Language: Expressive aphasia, Naming impaired, Repetition impaired  Articulation: Dysarthria  Orientation: Not oriented to place, Not oriented to time  Motor: Arm left  Paresis: 4/5  Leg left  AKA  Arm right  Paresis: 4/5  Leg right AKA      Laboratory:  CMP: No results for input(s): "GLUCOSE", "CALCIUM", "ALBUMIN", "PROT", "NA", "K", "CO2", "CL", "BUN", "CREATININE", "ALKPHOS", "ALT", "AST", "BILITOT" in the last 24 hours.  CBC: No results for input(s): "WBC", "RBC", "HGB", "HCT", "PLT", "MCV", "MCH", "MCHC" in the last 168 hours.  Lipid Panel: No results for input(s): "CHOL", "LDLCALC", "HDL", "TRIG" in the last 168 hours.  Coagulation: No results for input(s): "PT", "INR", "APTT" in the last 168 hours.  Hgb A1C: No results for input(s): "HGBA1C" in the last 168 hours.  TSH: No results for input(s): "TSH" in the last 168 hours.    Diagnostic Results:      Brain imaging/Vessel Imaging:  CTA Stroke MP - 5/28/2024     Impression:     No acute intracranial process.  Presumed volume loss with enlargement of the ventricles similar to prior studies.     Extensive calcification but no significant stenosis at the carotid bifurcations with mild narrowing right greater than left.  Mild narrowing at the origin of vertebral arteries bilaterally.  Question element of fibromuscular dysplasia of the cervical ICA bilaterally.     Mild to moderate stenosis of the cavernous/supraclinoid ICA.  No major branch occlusion at the [id number].    Cardiac Evaluation:   EKG from today: Sinus rhythm with PACs, VENT RATE OF 97 BPM     TTE 5/8/2024     Summary       Left Ventricle: The left ventricle is mildly dilated. Ventricular mass is normal. Normal wall thickness. Severe global hypokinesis present. There is severely reduced systolic function. Ejection fraction by visual approximation is 15%. There is diastolic dysfunction.    Right Ventricle: Severe right ventricular enlargement. Wall thickness is normal. " Right ventricle wall motion has global hypokinesis. Systolic function is mildly reduced.    Left Atrium: Left atrium is severely dilated. There is an atrial septal aneurysm.    Right Atrium: Right atrium is dilated.    Aortic Valve: There is mild aortic regurgitation.    Mitral Valve: There is mild regurgitation.    Tricuspid Valve: There is annular dilation present. There is normal leaflet mobility. There is severe functional regurgitation.    Pulmonary Artery: The estimated pulmonary artery systolic pressure is at least 24 mmHg. PASP is likely under-estimated in the setting of severe tricuspid regurgitation.    IVC/SVC: Central venous pressure of at least 8 mmHg.    No vegetation seen.      Hanh Patel, CHRIS  [facility]  Department of Vascular Neurology   [facility] - Emergency Dept

## 2024-05-29 NOTE — ED NOTES
Bed: [facility]  Expected date:   Expected time:   Means of arrival:   Comments:

## 2024-05-29 NOTE — ASSESSMENT & PLAN NOTE
Body mass index is 46.38 kg/m². Morbid obesity complicates all aspects of disease management from diagnostic modalities to treatment.

## 2024-05-29 NOTE — ED NOTES
Assumed care of pt at this time/    Pt AAOx4, resting comfortably in bed, NAD, respirations E/UL, updated on POC, wheels locked and in low position, call bell with in reach, Comfort positioning and restroom needs were addressed. Necessary items were placed with in reach and was advised when a reassessment would take place.

## 2024-05-29 NOTE — PLAN OF CARE
[facility] - Observation 11H  Initial Discharge Assessment       Primary Care Provider: Magen Christensen MD    Admission Diagnosis: Altered mental status [R41.82]  Encephalopathy [G93.40]  Acute focal neurological deficit [R29.818]  Chest pain [R07.9]    Admission Date: 5/28/2024  Expected Discharge Date: 5/31/2024    Transition of Care Barriers: None    Payor: HU[employer] MANAGED MEDICARE / Plan: HU[employer] SNP HMO PPO SPECIAL NEEDS / Product Type: Medicare Advantage /     Extended Emergency Contact Information  Primary Emergency Contact: Randell Connelly  Address: [address] [place] [address]  Mobile Phone: [phone]  Relation: Spouse  Secondary Emergency Contact: GodwinCornelia  Address: [address] [place] [address]  Home Phone: [phone]  Relation: Daughter    Discharge Plan A: Home with family  Discharge Plan B: Home Health      [id number] [place] [facility] [address] AT [facility]  [address]  [facility] [address]  Phone: [phone] Fax: [phone]    [facility] - [place], [place] - [address]  [address]  [facility] [address]  Phone: [phone] Fax: [phone]    [id number] - [place] - [address] [facility] [address]  [address]  [place] [address]  Phone: [phone] Fax: [phone]    [facility] - [place], [place] - [address] [facility]  [address] [place] [address]  Phone: [phone] Fax: [phone]    [id number] - [place], [place] - [address] [facility] [address]  [address]  [place] [address]  Phone: [phone] Fax: [phone]      Initial Assessment (most recent)       Adult Discharge Assessment - 05/29/24 1359          Discharge Assessment    Assessment Type Final Discharge Note     Confirmed/corrected  address, phone number and insurance Yes     Confirmed Demographics Correct on Facesheet     Source of Information family;health record     If unable to respond/provide information was family/caregiver contacted? Yes     Contact Name/Number Spouse: Randell Connelly [phone]     Communicated DEVAN with patient/caregiver Date not available/Unable to determine     Reason For Admission Acute encephalopathy     People in Home spouse     Do you expect to return to your current living situation? Yes     Do you have help at home or someone to help you manage your care at home? Yes     Who are your caregiver(s) and their phone number(s)? Spouse: Randell Connelly [phone]     Walking or Climbing Stairs ambulation difficulty, dependent;stair climbing difficulty, dependent     Mobility Management wheelchair, hospital bed, jany lyft     Dressing/Bathing Difficulty yes     Dressing/Bathing bathing difficulty, assistance 1 person     Dressing/Bathing Management BSC     Home Accessibility wheelchair accessible     Equipment Currently Used at Home lift device;wheelchair;bedside commode;hospital bed     Patient currently being followed by outpatient case management? No     Do you currently have service(s) that help you manage your care at home? Yes     Name and Contact number of agency [facility]     Is the pt/caregiver preference to resume services with current agency Yes     Do you take prescription medications? Yes     Do you have prescription coverage? Yes     Coverage [employer]     Do you have any problems affording any of your prescribed medications? No     Is the patient taking medications as prescribed? yes     Who is going to help you get home at discharge? Spouse: Randell Connelly [phone]     How do you get to doctors appointments? family or friend will provide;health plan transportation     Are you on dialysis? Yes     Dialysis Name and Scheduled [place] [facility] [place]1040[facility]     Do you take coumadin? No      Discharge Plan A Home with family     Discharge Plan B Home Health     DME Needed Upon Discharge  none     Discharge Plan discussed with: Spouse/sig other     Name(s) and Number(s) Spouse: Randell Connelly [phone]     Transition of Care Barriers None                     SW met with the patient who was accompanied by her spouse at the bedside and discussed the discharge plan. Gave him the discharge booklet and placed contact numbers on the white board in the room. Patient alert and sitting up in bed.  Patient's spouse verified her name , , PCP, Insurance and Pharmacy . Stated she lives with spouse and has 0 steps to point of entry . Prior to admission patient was completely dependent with all ADLS and was receiving HH services through [facility]: [facility]. She is not on coumadin. Pt receives HD treatment with [facility] [place]1040[facility].  DME's include:hospital bed, wheelchair, BSC, and lift device. Per spouse pt would benefit from a shower chair.  She takes medication as prescribed and has no problems getting  medication. Family will provide transportation at [place].      JENELLE Mahoney  Department of Case Management   [facility]  [address] [place] [address]. [place] [address]  Phone : [phone]      Discharge Plan A and Plan B have been determined by review of patient's clinical status, future medical and therapeutic needs, and coverage/benefits for post-acute care in coordination with multidisciplinary team members.

## 2024-05-29 NOTE — SUBJECTIVE & OBJECTIVE
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    Osteomyelitis     Osteomyelitis of left foot 04/30/2021    Ulcer of left foot     Vaginal delivery     x1       Past Surgical History:   Procedure Laterality Date    ABOVE-KNEE AMPUTATION Left 5/18/2021    Procedure: AMPUTATION, ABOVE KNEE;  Surgeon: Teddy Huber MD;  Location: [facility] OR [address] [place] [address];  Service: Vascular;  Laterality: Left;    ABOVE-KNEE AMPUTATION Right 3/18/2022    Procedure: AMPUTATION, ABOVE KNEE;  Surgeon: DAYNE Florez II, MD;  Location: [facility] OR [address] [place] [address];  Service: Vascular;  Laterality: Right;    Angiogram - Right Extremity Right 7/9/15    angiogram-left leg  10/6/15    ANGIOGRAPHY OF LOWER EXTREMITY Left 4/29/2021    Procedure: ANGIOGRAM, LOWER EXTREMITY;  Surgeon: Teddy Huber MD;  Location: [facility] OR [address] [place] [address];  Service: Vascular;  Laterality: Left;    BELOW KNEE AMPUTATION OF LOWER EXTREMITY Right 12/28/2021    Procedure: AMPUTATION, BELOW KNEE;  Surgeon: Kaitlyn Rojas MD;  Location: [facility] OR;  Service: General;  Laterality: Right;    CATHETERIZATION OF BOTH LEFT AND RIGHT HEART N/A 12/18/2019    Procedure: CATHETERIZATION, HEART, BOTH LEFT AND RIGHT;  Surgeon: Que Fernando III, MD;  Location: [facility] CATH LAB;  Service: Cardiology;  Laterality: N/A;    CORONARY ANGIOGRAPHY N/A 12/18/2019    Procedure: ANGIOGRAM, CORONARY ARTERY;  Surgeon: Que Fernando III, MD;  Location: [facility] CATH LAB;  Service: Cardiology;  Laterality: N/A;    CORONARY ANGIOGRAPHY INCLUDING BYPASS GRAFTS WITH CATHETERIZATION OF LEFT HEART N/A 7/28/2020    Procedure: ANGIOGRAM, CORONARY, INCLUDING BYPASS GRAFT, WITH LEFT HEART CATHETERIZATION, 9 am;  Surgeon: Rachel Easley MD;   Location: [facility] CATH LAB;  Service: Cardiology;  Laterality: N/A;    CORONARY ARTERY BYPASS GRAFT (CABG) N/A 1/14/2020    Procedure: CORONARY ARTERY BYPASS GRAFT (CABG) x 1     Off Pump;  Surgeon: Huang Altamirano MD;  Location: [facility] OR [address] [place] [address];  Service: Cardiovascular;  Laterality: N/A;    CREATION OF FEMORAL-TIBIAL ARTERY BYPASS Left 4/29/2021    Procedure: CREATION, BYPASS, ARTERIAL, FEMORAL TO ANTERIOR TIBIAL;  Surgeon: Teddy Huber MD;  Location: [facility] OR [address] [place] [address];  Service: Vascular;  Laterality: Left;    CREATION OF FEMOROPOPLITEAL ARTERIAL BYPASS USING GRAFT Left 8/18/2020    Procedure: CREATION, BYPASS, ARTERIAL, FEMORAL TO POPLITEAL, USING GRAFT, LEFT LOWER EXTREMITY;  Surgeon: Teddy Huber MD;  Location: [facility];  Service: Vascular;  Laterality: Left;  REQUEST 7:15 A.M. START----COVID NEGATIVE ON 8/17 1ST CASE STARTE PER LEANA ON 8/7/2020 @ 942AM-  RN PREOP 8/12/2020   T/S-----CLEARED BY CARDS-------PENDING INSURANCE    DEBRIDEMENT OF FOOT Left 9/8/2020    Procedure: DEBRIDEMENT, FOOT;  Surgeon: Rosio Mayes DPM;  Location: [facility];  Service: Podiatry;  Laterality: Left;  request neoxx .   RN Pre Op 9-4-2020, Covid negative on 9/5/20. C A    DEBRIDEMENT OF FOOT  3/4/2021    Procedure: DEBRIDEMENT, FOOT;  Surgeon: Teddy Huber MD;  Location: [facility] OR;  Service: Vascular;;    DEBRIDEMENT OF FOOT Left 3/9/2021    Procedure: DEBRIDEMENT, FOOT, bone biopsy;  Surgeon: Rosio Mayes DPM;  Location: [facility] OR;  Service: Podiatry;  Laterality: Left;  Request neoxx---COVID IN AM  REQUESTING NOON START  RN Phone Pre op.On Blood thinners Plavix and Eliquis.  Covid am of surgery. C A    DEBRIDEMENT OF FOOT Left 5/4/2021    Procedure: DEBRIDEMENT, FOOT;  Surgeon: Farooq Morley DPM;  Location: [address];  Service: Podiatry;  Laterality: Left;    INSERTION OF TUNNELED CENTRAL VENOUS HEMODIALYSIS CATHETER N/A 1/27/2020    Procedure: Insertion, Catheter, Central Venous,  Hemodialysis;  Surgeon: ESTEBAN Gomez III, MD;  Location: [facility] CATH LAB;  Service: Peripheral Vascular;  Laterality: N/A;    INSERTION OF TUNNELED CENTRAL VENOUS HEMODIALYSIS CATHETER  5/10/2023    Procedure: Insertion, Catheter, Central Venous, Hemodialysis;  Surgeon: Romulo Queen MD;  Location: [facility] CATH LAB;  Service: Cardiology;;    PERCUTANEOUS TRANSLUMINAL ANGIOPLASTY N/A 3/4/2021    Procedure: PTA (ANGIOPLASTY, PERCUTANEOUS, TRANSLUMINAL);  Surgeon: Teddy Huber MD;  Location: [facility] OR;  Service: Vascular;  Laterality: N/A;    REMOVAL OF ARTERIOVENOUS GRAFT Left 5/27/2021    Procedure: REMOVAL, GRAFT, LEFT LOWER EXTREMITY, WOUND EXPLORATION;  Surgeon: Teddy Huber MD;  Location: [facility] OR 2ND FLR;  Service: Vascular;  Laterality: Left;    REMOVAL OF NAIL OF DIGIT Left 3/9/2021    Procedure: REMOVAL, NAIL, DIGIT;  Surgeon: Rosio Mayes DPM;  Location: [facility] OR;  Service: Podiatry;  Laterality: Left;    RIGHT HEART CATHETERIZATION Right 5/10/2023    Procedure: INSERTION, CATHETER, RIGHT HEART;  Surgeon: Romulo Queen MD;  Location: [facility] CATH LAB;  Service: Cardiology;  Laterality: Right;    THROMBECTOMY Left 3/4/2021    Procedure: THROMBECTOMY, LEFT LOWER EXTREMITY BYPASS GRAFT, ANGIOGRAM, POSSIBLE INTERVENTION, POSSIBLE LEFT LOWER EXTREMITY BYPASS;  Surgeon: Teddy Huber MD;  Location: [facility] OR;  Service: Vascular;  Laterality: Left;    THROMBECTOMY Left 4/29/2021    Procedure: GRAFT THROMBECTOMY, LEFT LOWER EXTREMITY;  Surgeon: Teddy Huber MD;  Location: [facility] OR 2ND FLR;  Service: Vascular;  Laterality: Left;  14.5 min  1179.85 mGy  341.01 Gycm2  240 ml dye    THROMBECTOMY  10/22/2021    Procedure: THROMBECTOMY;  Surgeon: Saad Arenas MD;  Location: [facility] CATH LAB/EP;  Service: Cardiology;;       Review of patient's allergies indicates:   Allergen Reactions    Ciprofloxacin Itching    Iodine      Kidney injury    Pcn [penicillins]      Rash; tolerated ceftriaxone  on 1/13/20     Current Facility-Administered Medications   Medication Frequency    albuterol-ipratropium 2.5 mg-0.5 mg/3 mL nebulizer solution 3 mL Q4H PRN    [START ON 5/30/2024] allopurinol split tablet 50 mg Every other day    aluminum-magnesium hydroxide-simethicone 200-200-20 mg/5 mL suspension 30 mL QID PRN    apixaban tablet 5 mg BID    atorvastatin tablet 80 mg Daily    bisacodyL suppository 10 mg Daily PRN    calcitRIOL capsule 0.5 mcg Daily    calcium acetate(phosphat bind) capsule 667 mg TID WM    carvediloL tablet 6.25 mg BID    cefTRIAXone (Rocephin) 1 g in dextrose 5 % in water (D5W) 100 mL IVPB (MB+) Q24H    clopidogreL tablet 75 mg Daily    dextrose 10% bolus 125 mL 125 mL PRN    dextrose 10% bolus 250 mL 250 mL PRN    famotidine tablet 20 mg Daily    glucagon (human recombinant) injection 1 mg PRN    glucose chewable tablet 16 g PRN    glucose chewable tablet 24 g PRN    hydrALAZINE injection 5 mg Q6H PRN    hydrALAZINE tablet 50 mg TID    insulin aspart U-100 pen 0-5 Units QID (AC + HS) PRN    melatonin tablet 6 mg Nightly PRN    naloxone 0.4 mg/mL injection 0.02 mg PRN    ondansetron disintegrating tablet 8 mg Q8H PRN    polyethylene glycol packet 17 g BID PRN    prochlorperazine injection Soln 5 mg Q6H PRN    sertraline tablet 100 mg Daily    simethicone chewable tablet 80 mg QID PRN    sodium chloride 0.9% flush 5 mL PRN     Family History       Problem Relation (Age of Onset)    Diabetes Mother, Father, Paternal Grandmother    Heart disease Maternal Grandmother    No Known Problems Maternal Grandfather, Paternal Grandfather          Tobacco Use    Smoking status: Former    Smokeless tobacco: Never   Substance and Sexual Activity    Alcohol use: No    Drug use: Yes     Types: Marijuana     Comment: occassional    Sexual activity: Yes     Partners: Male     Review of Systems   Unable to perform ROS: Mental status change   Objective:     Vital Signs (Most Recent):  Temp: 97.2 °F (36.2 °C)  (05/29/24 0737)  Pulse: 86 (05/29/24 0737)  Resp: 18 (05/29/24 0737)  BP: 130/88 (05/29/24 0737)  SpO2: 95 % (05/29/24 0737) Vital Signs (24h Range):  Temp:  [97.2 °F (36.2 °C)-98.8 °F (37.1 °C)] 97.2 °F (36.2 °C)  Pulse:  [] 86  Resp:  [4-20] 18  SpO2:  [95 %-100 %] 95 %  BP: (127-198)/() 130/88     Weight: 70.1 kg (154 lb 8.7 oz) (05/29/24 0350)  Body mass index is 47.16 kg/m².  Body surface area is 1.54 meters squared.    No intake/output data recorded.     Physical Exam  Vitals and nursing note reviewed.   Constitutional:       General: She is not in acute distress.     Appearance: She is ill-appearing.      Comments: Alert but minimally conversant, dysarthric    HENT:      Head: Normocephalic and atraumatic.      Nose: Nose normal.      Mouth/Throat:      Pharynx: No oropharyngeal exudate.   Eyes:      Extraocular Movements: Extraocular movements intact.   Cardiovascular:      Rate and Rhythm: Normal rate and regular rhythm.      Heart sounds: Normal heart sounds.   Pulmonary:      Effort: Pulmonary effort is normal. No respiratory distress.      Breath sounds: Normal breath sounds.   Abdominal:      General: Bowel sounds are normal. There is no distension.      Palpations: Abdomen is soft.      Tenderness: There is no abdominal tenderness.   Musculoskeletal:         General: Deformity present.      Cervical back: Normal range of motion and neck supple.      Comments: Bilateral AKA  No pitting edema   Skin:     General: Skin is warm and dry.   Neurological:      General: No focal deficit present.      Mental Status: She is alert.      Motor: Weakness present.      Comments: Oriented to self and can identify  by name. Disoriented to place, situation, time  Dysarthric and aphasic   Follows some commands, requires repeated prompting    Able to sit herself up in bed, equal  strength bilaterally but generally weak    Psychiatric:         Mood and Affect: Affect is tearful.      Significant  Labs:  CBC:   Recent Labs   Lab 05/29/24  0458   WBC 4.83   RBC 3.46*   HGB 10.8*   HCT 34.4*      MCV 99*   MCH 31.2*   MCHC 31.4*     CMP:   Recent Labs   Lab 05/29/24  0458   *   CALCIUM 8.7   ALBUMIN 2.8*   PROT 8.4      K 4.9   CO2 21*      BUN 78*   CREATININE 5.9*   ALKPHOS 217*   ALT 14   AST 21   BILITOT 1.0     All labs within the past 24 hours have been reviewed.

## 2024-05-29 NOTE — ASSESSMENT & PLAN NOTE
Patient's anemia is currently controlled. Has not received any PRBCs to date. Etiology likely d/t chronic disease due to ESRD  Current CBC reviewed-   Lab Results   Component Value Date    HGB 11.3 (L) 05/28/2024    HCT 33.6 (L) 05/28/2024     Monitor serial CBC and transfuse if patient becomes hemodynamically unstable, symptomatic or H/H drops below 7/21.

## 2024-05-29 NOTE — ASSESSMENT & PLAN NOTE
Patient with Paroxysmal (<7 days) atrial fibrillation which is controlled currently with Beta Blocker. Patient is currently in sinus rhythm.[id number] Score: 3. Anticoagulation indicated. Anticoagulation done with eliquis .

## 2024-05-29 NOTE — SUBJECTIVE & OBJECTIVE
Interval History: NAEON. AFVSS. Patient has difficulty responding to questions, appears confused. Has difficulty following commands, needs repeated prompting. Neuro exam stable since admission - no significant change, no focal deficit. Oriented to self and can identify her  by name. SLP consult for dysarthria    Review of Systems   Unable to perform ROS: Mental status change   Neurological:         AMS, seizure like activity per    Per  today, no recurrence of seizure-like activity or bowel/ bladder incontinence. She is still disoriented.       Objective:     Vital Signs (Most Recent):  Temp: 97.2 °F (36.2 °C) (05/29/24 0737)  Pulse: 86 (05/29/24 0737)  Resp: 18 (05/29/24 0737)  BP: 130/88 (05/29/24 0737)  SpO2: 95 % (05/29/24 0737) Vital Signs (24h Range):  Temp:  [97.2 °F (36.2 °C)-98.8 °F (37.1 °C)] 97.2 °F (36.2 °C)  Pulse:  [] 86  Resp:  [4-20] 18  SpO2:  [95 %-100 %] 95 %  BP: (127-198)/() 130/88     Weight: 70.1 kg (154 lb 8.7 oz)  Body mass index is 47.16 kg/m².  No intake or output data in the 24 hours ending 05/29/24 0956      Physical Exam  Vitals and nursing note reviewed.   Constitutional:       General: She is not in acute distress.     Comments: Alert but minimally conversant, dysarthric    HENT:      Head: Normocephalic and atraumatic.      Nose: Nose normal.      Mouth/Throat:      Pharynx: No oropharyngeal exudate.   Eyes:      Extraocular Movements: Extraocular movements intact.   Cardiovascular:      Rate and Rhythm: Normal rate and regular rhythm.      Heart sounds: Normal heart sounds.   Pulmonary:      Effort: Pulmonary effort is normal. No respiratory distress.      Breath sounds: Normal breath sounds.   Abdominal:      General: Bowel sounds are normal. There is no distension.      Palpations: Abdomen is soft.      Tenderness: There is no abdominal tenderness.   Musculoskeletal:      Cervical back: Normal range of motion and neck supple.      Comments:  "Bilateral AKA  No pitting edema   Skin:     General: Skin is warm and dry.   Neurological:      General: No focal deficit present.      Mental Status: She is alert.      Motor: Weakness present.      Comments: Oriented to self and can identify  by name. Disoriented to place, situation, time  Dysarthric and aphasic   Follows some commands, requires repeated prompting    Able to sit herself up in bed, equal  strength bilaterally but generally weak    Psychiatric:         Mood and Affect: Affect is tearful.             Significant Labs: All pertinent labs within the past 24 hours have been reviewed.  CBC:   Recent Labs   Lab 05/28/24 2149 05/29/24 0458   WBC 5.49 4.83   HGB 11.3* 10.8*   HCT 33.6* 34.4*    184     CMP:   Recent Labs   Lab 05/28/24 2149 05/29/24 0458    141   K 5.2* 4.9    103   CO2 20* 21*   * 273*   BUN 74* 78*   CREATININE 5.8* 5.9*   CALCIUM 8.8 8.7   PROT 8.8* 8.4   ALBUMIN 2.9* 2.8*   BILITOT 1.1* 1.0   ALKPHOS 221* 217*   AST 35 21   ALT 17 14   ANIONGAP 15 17*     Urine Culture: No results for input(s): "LABURIN" in the last 48 hours.  Urine Studies:   Recent Labs   Lab 05/29/24  0118   COLORU Yellow   APPEARANCEUA Clear   PHUR 7.0   SPECGRAV 1.025   PROTEINUA 3+*   GLUCUA 1+*   KETONESU Negative   BILIRUBINUA Negative   OCCULTUA 2+*   NITRITE Negative   LEUKOCYTESUR 3+*   RBCUA 10*   WBCUA 22*   BACTERIA Rare   SQUAMEPITHEL 3   HYALINECASTS 0       Significant Imaging: I have reviewed all pertinent imaging results/findings within the past 24 hours.  "

## 2024-05-29 NOTE — PLAN OF CARE
Problem: SLP  Goal: SLP Goal  Description: Speech Language Pathology Goals  Goals expected to be met by 6/5/24    1. Pt will participate in ongoing assessment of swallow function to determine safest, least restrictive means of nutrition/hydration  2. Educate Pt and family on aspiration precautions and SLP POC    Outcome: Progressing     SLP Bedside Swallow Evaluation initiated. No overt S/S aspiration with trials accepted. Risk of aspiration remains due to cognitive status and decreased strength. REC: cautiously resume Level V minced and moist textures, thin liquids, provided strict 1:1 supervision with all PO, only when vital signs stable, Pt is awake/alert/attentive, upright with all PO, single bites/sips, avoid straws, remain upright at least 30 minutes following session and closely monitor for S/S aspiration. ST to continue to follow for ongoing swallow assessment and further assessment of speech, language and cognition.     5/29/2024

## 2024-05-29 NOTE — ED TRIAGE NOTES
Suyapa Connelly, a 58 y.o. female presents to the ED w/ complaint of AMS.  PT's  reported that pt went unresponsive at 2011.  EMS arrived and pt was responsive but would not answer questions.  Pt normally AOX4    Triage note:  Chief Complaint   Patient presents with    Altered Mental Status     Pt's  reports that pt's eyes rolled back, she became unresponsive, and defecated on herself, and is now altered,  reports she is AAOx4 at baseline, pt is awake and alert in triage, but not responding to any questions     Review of patient's allergies indicates:   Allergen Reactions    Ciprofloxacin Itching    Iodine      Kidney injury    Pcn [penicillins]      Rash; tolerated ceftriaxone on 1/13/20     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    Osteomyelitis     Osteomyelitis of left foot 04/30/2021    Ulcer of left foot     Vaginal delivery     x1

## 2024-05-29 NOTE — ASSESSMENT & PLAN NOTE
Patient's FSGs are uncontrolled due to hyperglycemia on current medication regimen.  Last A1c reviewed-   Lab Results   Component Value Date    HGBA1C 8.0 (H) 04/24/2024     Most recent fingerstick glucose reviewed-   Recent Labs   Lab 05/28/24  2142   POCTGLUCOSE 284*     Current correctional scale  Low  Maintain anti-hyperglycemic dose as follows-   Antihyperglycemics (From admission, onward)      Start     Stop Route Frequency Ordered    05/29/24 0101  insulin aspart U-100 pen 0-5 Units         -- SubQ Before meals & nightly PRN 05/29/24 0001        Hold Oral hypoglycemics while patient is in the hospital.   DM diet when passes quesada

## 2024-05-29 NOTE — ASSESSMENT & PLAN NOTE
ESRD on iHD MWF  Dr. Jesika MATA TDC  + residual renal fx     Missed HD session on Monday prior to presentation.     Plan/Recommendations:     -HD today 5/29 for metabolic clearance and volume management   -strict I/O's  -Renal diet when advanced  -Continue calcium acetate   -Hgb @ goal for ESRD, continue trending.  No need for NAEEM therapy.

## 2024-05-29 NOTE — ASSESSMENT & PLAN NOTE
58F presenting after an episode of full body rigidity, bowel incontinence, and now persistent confusion. Se was a stroke code on arrival to ED, CTA with no acute LVO, vascular neurology recommending admission/EEG. No seizure history.  - Afebrile and HDS on admission  - Elevated BUN on admission, nephrology consult for HD  - EEG ordered: No epileptiform discharges, periodic discharges, lateralized rhythmic delta activity or electrographic seizures were seen.  IMPRESSION: Abnormal EEG due to a mild to moderate generalized cerebral dysfunction with no electrographic seizures or indications of seizure tendency.  - Seizure, aspiration, fall precautions  - Neuro checks q4h  - SLP consult   - UDS negative  - Ammonia wnl   - UA infectious - start empiric rocephin and follow urine culture  - Consider neurology consult if no improvement with abx  - MRI brain ordered - can cancel MRI if pt improves with UTI tx

## 2024-05-29 NOTE — HPI
Suyapa Connelly is a 58 y.o. female with PMHx of b/l AKAs, ESRD on HD, CAD s/p CABG, afib on eliquis, HTN here with AMS.  at bedside assists with history. He reports patient was in her normal state of health until yesterday afternoon when they were driving and stopped at a gas station. She suddenly became rigid with her arms extending outwards and her head back with her eyes rolling backwards then suddenly relaxed and had a large bowel movement. Since this episode she has been confused and altered with abnormal speech and generalized weakness.  has never witnessed a similar episode. She skipped HD Monday, last full session Friday. History from patient limited. She follows some commands and is tearful ad reports she is afraid of being in the hospital, does not answer questions.    ED: HR in 90s and briefly hypertensive but otherwise HDS and afebrile. K 5.2. bicarb 20. Glucose 264. BUN 74. Cr 5.8. Albumin 1.1. T bili 1.1. . Hbg 11.3. She was a stroke code in the ED with NIHSS 5 and had CTA without acute intracranial process. Vascular neurology felt symptoms were stroke mimic/post ictal and recommended EEG/admission. She continue to be encephalopathic in ED and was admitted to .

## 2024-05-29 NOTE — PROGRESS NOTES
"[facility] [address] [place] [address] Medicine  Progress Note    Patient Name: Suyapa Connelly  MRN: [id number]  Patient Class: OP- Observation   Admission Date: 5/28/2024  Length of Stay: 0 days  Attending Physician: Frances Bee MD  Primary Care Provider: Magen Christensen MD        Subjective:     Principal Problem:Acute encephalopathy        HPI:  uSyapa Connelly is a 58 y.o. female with PMHx of b/l AKAs, ESRD on HD, CAD s/p CABG, afib on eliquis, HTN here with AMS.  at bedside assists with history. He reports patient was in her normal state of health until yesterday afternoon when they were driving and stopped at a gas station. She suddenly became rigid with her arms extending outwards and her head back with her eyes rolling backwards then suddenly relaxed and had a large bowel movement. Since this episode she has been confused and altered with abnormal speech and generalized weakness.  has never witnessed a similar episode. She skipped HD Monday, last full session Friday. History from patient limited. She follows some commands and is tearful ad reports she is afraid of being in the hospital, does not answer questions.    ED: HR in 90s and briefly hypertensive but otherwise HDS and afebrile. K 5.2. bicarb 20. Glucose 264. BUN 74. Cr 5.8. Albumin 1.1. T bili 1.1. . Hbg 11.3. She was a stroke code in the ED with NIHSS 5 and had CTA without acute intracranial process. Vascular neurology felt symptoms were stroke mimic/post ictal and recommended EEG/admission. She continue to be encephalopathic in ED and was admitted to .     Overview/Hospital Course:  Suyapa Connelly was placed in  observation for workup of encephalopathy. Patient continues to be confused and minimally engaged. She is able to follow commands with prompting. UA infectious, reflex culture pending. Start empiric rocephin. EEG obtained: "No epileptiform discharges, periodic discharges, lateralized rhythmic delta activity " "or electrographic seizures were seen. IMPRESSION:   Abnormal EEG due to a mild to moderate generalized cerebral dysfunction with no electrographic seizures or indications of seizure tendency."  Consider neurology consult if no response to abx treatment.    Interval History: NAEON. AFVSS. Patient has difficulty responding to questions, appears confused. Has difficulty following commands, needs repeated prompting. Neuro exam stable since admission - no significant change, no focal deficit. Oriented to self and can identify her  by name. SLP consult for dysarthria    Review of Systems   Unable to perform ROS: Mental status change   Neurological:         AMS, seizure like activity per    Per  today, no recurrence of seizure-like activity or bowel/ bladder incontinence. She is still disoriented.       Objective:     Vital Signs (Most Recent):  Temp: 97.2 °F (36.2 °C) (05/29/24 0737)  Pulse: 86 (05/29/24 0737)  Resp: 18 (05/29/24 0737)  BP: 130/88 (05/29/24 0737)  SpO2: 95 % (05/29/24 0737) Vital Signs (24h Range):  Temp:  [97.2 °F (36.2 °C)-98.8 °F (37.1 °C)] 97.2 °F (36.2 °C)  Pulse:  [] 86  Resp:  [4-20] 18  SpO2:  [95 %-100 %] 95 %  BP: (127-198)/() 130/88     Weight: 70.1 kg (154 lb 8.7 oz)  Body mass index is 47.16 kg/m².  No intake or output data in the 24 hours ending 05/29/24 0956      Physical Exam  Vitals and nursing note reviewed.   Constitutional:       General: She is not in acute distress.     Comments: Alert but minimally conversant, dysarthric    HENT:      Head: Normocephalic and atraumatic.      Nose: Nose normal.      Mouth/Throat:      Pharynx: No oropharyngeal exudate.   Eyes:      Extraocular Movements: Extraocular movements intact.   Cardiovascular:      Rate and Rhythm: Normal rate and regular rhythm.      Heart sounds: Normal heart sounds.   Pulmonary:      Effort: Pulmonary effort is normal. No respiratory distress.      Breath sounds: Normal breath sounds. " "  Abdominal:      General: Bowel sounds are normal. There is no distension.      Palpations: Abdomen is soft.      Tenderness: There is no abdominal tenderness.   Musculoskeletal:      Cervical back: Normal range of motion and neck supple.      Comments: Bilateral AKA  No pitting edema   Skin:     General: Skin is warm and dry.   Neurological:      General: No focal deficit present.      Mental Status: She is alert.      Motor: Weakness present.      Comments: Oriented to self and can identify  by name. Disoriented to place, situation, time  Dysarthric and aphasic   Follows some commands, requires repeated prompting    Able to sit herself up in bed, equal  strength bilaterally but generally weak    Psychiatric:         Mood and Affect: Affect is tearful.             Significant Labs: All pertinent labs within the past 24 hours have been reviewed.  CBC:   Recent Labs   Lab 05/28/24 2149 05/29/24  0458   WBC 5.49 4.83   HGB 11.3* 10.8*   HCT 33.6* 34.4*    184     CMP:   Recent Labs   Lab 05/28/24 2149 05/29/24 0458    141   K 5.2* 4.9    103   CO2 20* 21*   * 273*   BUN 74* 78*   CREATININE 5.8* 5.9*   CALCIUM 8.8 8.7   PROT 8.8* 8.4   ALBUMIN 2.9* 2.8*   BILITOT 1.1* 1.0   ALKPHOS 221* 217*   AST 35 21   ALT 17 14   ANIONGAP 15 17*     Urine Culture: No results for input(s): "LABURIN" in the last 48 hours.  Urine Studies:   Recent Labs   Lab 05/29/24  0118   COLORU Yellow   APPEARANCEUA Clear   PHUR 7.0   SPECGRAV 1.025   PROTEINUA 3+*   GLUCUA 1+*   KETONESU Negative   BILIRUBINUA Negative   OCCULTUA 2+*   NITRITE Negative   LEUKOCYTESUR 3+*   RBCUA 10*   WBCUA 22*   BACTERIA Rare   SQUAMEPITHEL 3   HYALINECASTS 0       Significant Imaging: I have reviewed all pertinent imaging results/findings within the past 24 hours.    Assessment/Plan:      * Acute encephalopathy  58F presenting after an episode of full body rigidity, bowel incontinence, and now persistent confusion. Se " was a stroke code on arrival to ED, CTA with no acute LVO, vascular neurology recommending admission/EEG. No seizure history.  - Afebrile and HDS on admission  - Elevated BUN on admission, nephrology consult for HD  - EEG ordered: No epileptiform discharges, periodic discharges, lateralized rhythmic delta activity or electrographic seizures were seen.  IMPRESSION: Abnormal EEG due to a mild to moderate generalized cerebral dysfunction with no electrographic seizures or indications of seizure tendency.  - Seizure, aspiration, fall precautions  - Neuro checks q4h  - SLP consult   - UDS negative  - Ammonia wnl   - UA infectious - start empiric rocephin and follow urine culture  - Consider neurology consult if no improvement with abx  - MRI brain ordered - can cancel MRI if pt improves with UTI tx    Severe obesity (BMI >= 40)  Body mass index is 46.38 kg/m². Morbid obesity complicates all aspects of disease management from diagnostic modalities to treatment.    ESRD (end stage renal disease)  - HD MWF, missed Monday  - Nephrology consulted for HD    S/P AKA (above knee amputation) bilateral  - Noted. Fall precautions.    Chronic combined systolic and diastolic heart failure  Patient is identified as having Combined Systolic and Diastolic heart failure that is Chronic. Patient is off CHF pathway.CHF is currently controlled. Continue Beta Blocker and Nitrate/Vasodilator. Volume management with HD.  - Place on fluid restriction of 1.5 L. Continue to stress to patient importance of self efficacy and  on diet for CHF.   - Monitor clinical status closely. Monitor on telemetry.   - Monitor strict Is&Os and daily weights.    Results for orders placed during the hospital encounter of 05/07/24    Echo    Interpretation Summary    Left Ventricle: The left ventricle is mildly dilated. Ventricular mass is normal. Normal wall thickness. Severe global hypokinesis present. There is severely reduced systolic function. Ejection  fraction by visual approximation is 15%. There is diastolic dysfunction.    Right Ventricle: Severe right ventricular enlargement. Wall thickness is normal. Right ventricle wall motion has global hypokinesis. Systolic function is mildly reduced.    Left Atrium: Left atrium is severely dilated. There is an atrial septal aneurysm.    Right Atrium: Right atrium is dilated.    Aortic Valve: There is mild aortic regurgitation.    Mitral Valve: There is mild regurgitation.    Tricuspid Valve: There is annular dilation present. There is normal leaflet mobility. There is severe functional regurgitation.    Pulmonary Artery: The estimated pulmonary artery systolic pressure is at least 24 mmHg. PASP is likely under-estimated in the setting of severe tricuspid regurgitation.    IVC/SVC: Central venous pressure of at least 8 mmHg.    No vegetation seen.       Paroxysmal atrial fibrillation  Patient with Paroxysmal (<7 days) atrial fibrillation which is controlled currently with Beta Blocker. Patient is currently in sinus rhythm.[id number] Score: 3. Anticoagulation indicated. Anticoagulation done with eliquis .    Anemia due to chronic kidney disease, on chronic dialysis  Patient's anemia is currently controlled. Has not received any PRBCs to date. Etiology likely d/t chronic disease due to ESRD  Current CBC reviewed-   Lab Results   Component Value Date    HGB 11.3 (L) 05/28/2024    HCT 33.6 (L) 05/28/2024     Monitor serial CBC and transfuse if patient becomes hemodynamically unstable, symptomatic or H/H drops below 7/21.    CAD (coronary artery disease)  Patient with known CAD s/p CABG, which is controlled Will continue Plavix and Statin and monitor for S/Sx of angina/ACS. Continue to monitor on telemetry.     Type 2 diabetes mellitus with hyperglycemia, with long-term current use of insulin  Patient's FSGs are uncontrolled due to hyperglycemia on current medication regimen.  Last A1c reviewed-   Lab Results   Component Value  Date    HGBA1C 8.0 (H) 04/24/2024     Most recent fingerstick glucose reviewed-   Recent Labs   Lab 05/28/24  2142   POCTGLUCOSE 284*     Current correctional scale  Low  Maintain anti-hyperglycemic dose as follows-   Antihyperglycemics (From admission, onward)      Start     Stop Route Frequency Ordered    05/29/24 0101  insulin aspart U-100 pen 0-5 Units         -- SubQ Before meals & nightly PRN 05/29/24 0001        Hold Oral hypoglycemics while patient is in the hospital.   DM diet when passes quesada    CAROLIN (generalized anxiety disorder)  - Continue home sertraline       VTE Risk Mitigation (From admission, onward)           Ordered     apixaban tablet 5 mg  2 times daily         05/29/24 0036     Reason for No Pharmacological VTE Prophylaxis  Once        Question:  Reasons:  Answer:  Already adequately anticoagulated on oral Anticoagulants    05/29/24 0001     IP VTE HIGH RISK PATIENT  Once         05/29/24 0001     Reason for no Mechanical VTE Prophylaxis  Once        Comments: Bilateral AKA, on eliquis   Question:  Reasons:  Answer:  Physician Provided (leave comment)    05/29/24 0001                    Discharge Planning   DEVAN: 5/31/2024     Code Status: Full Code   Is the patient medically ready for discharge?:     Reason for patient still in hospital (select all that apply): Patient trending condition, Laboratory test, Treatment, Imaging, and Consult recommendations                     Josie Scherer PA-C  Department of Hospital Medicine   [facility] 11H

## 2024-05-29 NOTE — PLAN OF CARE
Problem: Adult Inpatient Plan of Care  Goal: Plan of Care Review  Reactivated  Goal: Patient-Specific Goal (Individualized)  Reactivated  Goal: Absence of Hospital-Acquired Illness or Injury  Reactivated  Goal: Optimal Comfort and Wellbeing  Reactivated  Goal: Readiness for Transition of Care  Reactivated     Problem: Bariatric Environmental Safety  Goal: Safety Maintained with Care  Reactivated     Problem: Infection  Goal: Absence of Infection Signs and Symptoms  Reactivated     Problem: Wound  Goal: Optimal Coping  Reactivated  Goal: Optimal Functional Ability  Reactivated  Goal: Absence of Infection Signs and Symptoms  Reactivated  Goal: Improved Oral Intake  Reactivated  Goal: Optimal Pain Control and Function  Reactivated  Goal: Skin Health and Integrity  Reactivated  Goal: Optimal Wound Healing  Reactivated     Problem: Sepsis/Septic Shock  Goal: Optimal Coping  Reactivated  Goal: Absence of Bleeding  Reactivated  Goal: Blood Glucose Level Within Targeted Range  Reactivated  Goal: Absence of Infection Signs and Symptoms  Reactivated  Goal: Optimal Nutrition Intake  Reactivated     Problem: Diabetes Comorbidity  Goal: Blood Glucose Level Within Targeted Range  Reactivated     Problem: Skin Injury Risk Increased  Goal: Skin Health and Integrity  Reactivated

## 2024-05-29 NOTE — PT/OT/SLP EVAL
Speech Language Pathology Evaluation  Bedside Swallow    Patient Name:  Suyapa Connelly   MRN:  [id number]  Admitting Diagnosis: Acute encephalopathy    Recommendations:                 General Recommendations:  Dysphagia therapy and Speech language evaluation  Diet recommendations:  Minced & Moist Diet - IDDSI Level 5, Thin liquids - IDDSI Level 0 All items must be minced and moist. Please avoid mixed consistencies (such as cereals, soups with large pieces of meat/vegetable, etc.), avoid dry/coarse/crumbly items such as rice ,nuts, seeds, dried fruit, coconut, avoid fibrous foods, avoid tough skins, avoid tough vegetables (i.e. no corn, brussel sprouts,etc) avoid chewy/sticky foods (i.e. no peanut putter, caramel, licorice, etc.)    Aspiration Precautions: Strict 1:1 supervision with all PO required for safety, only when vital signs stable, only when awake/alert/attentive, avoid straws, single bites/sips, 1 bite/sip at a time, Eliminate distractions, Frequent oral care, HOB to 90 degrees, Meds crushed in puree, Remain upright 30 minutes post meal, and Strict aspiration precautions. Continue to monitor for signs and symptoms of aspiration and discontinue oral feeding should you notice any of the following: watery eyes, reddened facial area, wet vocal quality, increased work of breathing, change in respiratory status, increased congestion, coughing, fever and/or change in level of alertness   General Precautions: Standard, aspiration, aphasia, fall  Communication strategies:  provide increased time to answer and go to room if call light pushed    Assessment:     Suyapa Connelly is a 58 y.o. female with an SLP diagnosis of Dysphagia.  No overt S/S aspiration with PO trials accepted.  She presents with risk of aspiration due to altered mental status. ST to continue to follow.      History:     Past Medical History:   Diagnosis Date    Anxiety     Chronic pain syndrome     CKD (chronic kidney disease), stage III      Depression     Diabetes mellitus, type 2     GERD (gastroesophageal reflux disease)     Hyperemesis 03/23/2021    Hypokalemia 03/23/2021    Infection of below knee amputation stump 03/12/2022    Osteomyelitis     Osteomyelitis of left foot 04/30/2021    Ulcer of left foot     Vaginal delivery     x1       Past Surgical History:   Procedure Laterality Date    ABOVE-KNEE AMPUTATION Left 5/18/2021    Procedure: AMPUTATION, ABOVE KNEE;  Surgeon: Teddy Huber MD;  Location: [facility] OR [address] [place] [address];  Service: Vascular;  Laterality: Left;    ABOVE-KNEE AMPUTATION Right 3/18/2022    Procedure: AMPUTATION, ABOVE KNEE;  Surgeon: DAYNE Florez II, MD;  [facility];  Service: Vascular;  Laterality: Right;    Angiogram - Right Extremity Right 7/9/15    angiogram-left leg  10/6/15    ANGIOGRAPHY OF LOWER EXTREMITY Left 4/29/2021    Procedure: ANGIOGRAM, LOWER EXTREMITY;  Surgeon: Teddy Huber MD;  [facility];  Service: Vascular;  Laterality: Left;    BELOW KNEE AMPUTATION OF LOWER EXTREMITY Right 12/28/2021    Procedure: AMPUTATION, BELOW KNEE;  Surgeon: Kaitlyn Rojas MD;  Location: [facility] OR;  Service: General;  Laterality: Right;    CATHETERIZATION OF BOTH LEFT AND RIGHT HEART N/A 12/18/2019    Procedure: CATHETERIZATION, HEART, BOTH LEFT AND RIGHT;  Surgeon: Que Fernando III, MD;  Location: [facility] CATH LAB;  Service: Cardiology;  Laterality: N/A;    CORONARY ANGIOGRAPHY N/A 12/18/2019    Procedure: ANGIOGRAM, CORONARY ARTERY;  Surgeon: Que Fernando III, MD;  Location: [facility] CATH LAB;  Service: Cardiology;  Laterality: N/A;    CORONARY ANGIOGRAPHY INCLUDING BYPASS GRAFTS WITH CATHETERIZATION OF LEFT HEART N/A 7/28/2020    Procedure: ANGIOGRAM, CORONARY, INCLUDING BYPASS GRAFT, WITH LEFT HEART CATHETERIZATION, 9 am;  Surgeon: Rachel Easley MD;  Location: [facility] CATH LAB;  Service: Cardiology;  Laterality: N/A;    CORONARY ARTERY BYPASS GRAFT (CABG) N/A 1/14/2020     Procedure: CORONARY ARTERY BYPASS GRAFT (CABG) x 1     Off Pump;  Surgeon: Huang Altamirano MD;  Location: [address];  Service: Cardiovascular;  Laterality: N/A;    CREATION OF FEMORAL-TIBIAL ARTERY BYPASS Left 4/29/2021    Procedure: CREATION, BYPASS, ARTERIAL, FEMORAL TO ANTERIOR TIBIAL;  Surgeon: Teddy Huber MD;  [facility];  Service: Vascular;  Laterality: Left;    CREATION OF FEMOROPOPLITEAL ARTERIAL BYPASS USING GRAFT Left 8/18/2020    Procedure: CREATION, BYPASS, ARTERIAL, FEMORAL TO POPLITEAL, USING GRAFT, LEFT LOWER EXTREMITY;  Surgeon: Teddy Huber MD;  Location: [facility];  Service: Vascular;  Laterality: Left;  REQUEST 7:15 A.M. START----COVID NEGATIVE ON 8/17 1ST CASE STARTKENYA PER LEANA ON 8/7/2020 @ 942AM-LO  RN PREOP 8/12/2020   T/S-----CLEARED BY CARDS-------PENDING INSURANCE    DEBRIDEMENT OF FOOT Left 9/8/2020    Procedure: DEBRIDEMENT, FOOT;  Surgeon: Rosio Mayes DPM;  Location: [facility];  Service: Podiatry;  Laterality: Left;  request neoxx .   RN Pre Op 9-4-2020, Covid negative on 9/5/20. C A    DEBRIDEMENT OF FOOT  3/4/2021    Procedure: DEBRIDEMENT, FOOT;  Surgeon: Teddy Huber MD;  Location: [facility];  Service: Vascular;;    DEBRIDEMENT OF FOOT Left 3/9/2021    Procedure: DEBRIDEMENT, FOOT, bone biopsy;  Surgeon: Rosio Mayes DPM;  Location: [facility] OR;  Service: Podiatry;  Laterality: Left;  Request neoxx---COVID IN AM  REQUESTING NOON START  RN Phone Pre op.On Blood thinners Plavix and Eliquis.  Covid am of surgery. C A    DEBRIDEMENT OF FOOT Left 5/4/2021    Procedure: DEBRIDEMENT, FOOT;  Surgeon: Farooq Morley DPM;  [address]  Service: Podiatry;  Laterality: Left;    INSERTION OF TUNNELED CENTRAL VENOUS HEMODIALYSIS CATHETER N/A 1/27/2020    Procedure: Insertion, Catheter, Central Venous, Hemodialysis;  Surgeon: ESTEBAN Gomez III, MD;  Location: [facility] CATH LAB;  Service: Peripheral Vascular;  Laterality: N/A;     INSERTION OF TUNNELED CENTRAL VENOUS HEMODIALYSIS CATHETER  5/10/2023    Procedure: Insertion, Catheter, Central Venous, Hemodialysis;  Surgeon: Romulo Queen MD;  Location: [facility] CATH LAB;  Service: Cardiology;;    PERCUTANEOUS TRANSLUMINAL ANGIOPLASTY N/A 3/4/2021    Procedure: PTA (ANGIOPLASTY, PERCUTANEOUS, TRANSLUMINAL);  Surgeon: Teddy Huber MD;  Location: [facility] OR;  Service: Vascular;  Laterality: N/A;    REMOVAL OF ARTERIOVENOUS GRAFT Left 5/27/2021    Procedure: REMOVAL, GRAFT, LEFT LOWER EXTREMITY, WOUND EXPLORATION;  Surgeon: Teddy Huber MD;  Location: [facility] OR 2ND FLR;  Service: Vascular;  Laterality: Left;    REMOVAL OF NAIL OF DIGIT Left 3/9/2021    Procedure: REMOVAL, NAIL, DIGIT;  Surgeon: Rosio Mayes DPM;  Location: [facility] OR;  Service: Podiatry;  Laterality: Left;    RIGHT HEART CATHETERIZATION Right 5/10/2023    Procedure: INSERTION, CATHETER, RIGHT HEART;  Surgeon: Romulo Queen MD;  Location: [facility] CATH LAB;  Service: Cardiology;  Laterality: Right;    THROMBECTOMY Left 3/4/2021    Procedure: THROMBECTOMY, LEFT LOWER EXTREMITY BYPASS GRAFT, ANGIOGRAM, POSSIBLE INTERVENTION, POSSIBLE LEFT LOWER EXTREMITY BYPASS;  Surgeon: Teddy Huber MD;  Location: [facility] OR;  Service: Vascular;  Laterality: Left;    THROMBECTOMY Left 4/29/2021    Procedure: GRAFT THROMBECTOMY, LEFT LOWER EXTREMITY;  Surgeon: Teddy Huber MD;  Location: [facility] OR 2ND FLR;  Service: Vascular;  Laterality: Left;  14.5 min  1179.85 mGy  341.01 Gycm2  240 ml dye    THROMBECTOMY  10/22/2021    Procedure: THROMBECTOMY;  Surgeon: Saad rAenas MD;  Location: [facility] CATH LAB/EP;  Service: Cardiology;;       Social History: Patient lives with Spouse.    Prior Intubation HX:  none this admission    Modified Barium Swallow: none prior at this facility    Chest X-Rays: none recent    Prior diet: regular textures, thin liquids per Spouse.    Subjective     SLP reviewed Pt with RN, RN cleared for tx,  "explained Pt tolerated medications without difficulty  Pt presents confused  She explains, "Yeah"  Spouse explains, "She ate yesterday"     Pain/Comfort:  Pain Rating 1: other (see comments) (did not rate)    Respiratory Status: Room air    Objective:     Oral Musculature Evaluation  Oral Musculature: general weakness, unable to assess due to poor participation/comprehension  Dentition: present and adequate  Secretion Management: adequate  Mucosal Quality: dry  Oral Labial Strength and Mobility: functional seal, other (see comments) (limited assessment 2/2 decreased command following)  Lingual Strength and Mobility: other (see comments) (limited assessment 2/2 decreased command following)  Volitional Swallow: elicited, timely  Voice Prior to PO Intake: clear, low intensity    Bedside Swallow Eval:   Consistencies Assessed:  Thin liquids : ice chips x3, cup sips single x2, continuous x2  Puree : tsp bites x4  Solids bite of kranthi cracker x1      Oral Phase:   Mildly prolonged mastication with solid  Dry mouth  Mild lingual stasis with solid     Pharyngeal Phase:   no overt clinical signs/symptoms of aspiration    Compensatory Strategies  Unable to assess 2/2 inconsistent command following     Treatment: Pt found upright in bed. She was alert with waxing/waning attention. She followed simple commands 40% of attempts provided mod-max cues and encouragement from SLP and Spouse in the room. Pt with minimal eye contact and at times easily distracted.  Pt at times placed hands to lips and gestured like she took a sip from a cup.  No overt S/S aspiration with PO trials accepted. Pt with inconsistent, at times large, self-fed sip size. SLP unable to r/o silent aspiration at the bedside. SLP educated Pt and Spouse on SLP role, definition and risk of aspiration, aspiration precautions, diet recommendations/modifications, S/S aspiration and ongoing SLP POC. No additional questions. Pt remained upright in bed with call light in " reach, Spouse in the room, upon SLP exit.     Goals:   Multidisciplinary Problems       SLP Goals          Problem: SLP    Goal Priority Disciplines Outcome   SLP Goal     SLP Progressing   Description: Speech Language Pathology Goals  Goals expected to be met by 6/5/24    1. Pt will participate in ongoing assessment of swallow function to determine safest, least restrictive means of nutrition/hydration  2. Educate Pt and family on aspiration precautions and SLP POC                         Plan:     Patient to be seen:  4 x/week   Plan of Care expires:  06/28/24  Plan of Care reviewed with:  patient, spouse   SLP Follow-Up:  Yes       Discharge recommendations:   pending progress     Time Tracking:     SLP Treatment Date:   05/29/24  Speech Start Time:  1320  Speech Stop Time:  1343     Speech Total Time (min):  23 min    Billable Minutes: Eval Swallow and Oral Function 9 and Self Care/Home Management Training 12    05/29/2024

## 2024-05-29 NOTE — ASSESSMENT & PLAN NOTE
59 y/o F with PMHx of CAD s/p CABG, A-fib (on eliquis), B/L AKA, ESRD (on dialysis), obesity. Patient was found unresponsive at home by her  around 2000, who called EMS. Upon EMS arrival, patient was awake but not following commands. On arrival to the ED, patient was able to state name but had periods of confusion. Unknown LKN. Stroke code activated.     -NIHSS 5 for confusion, aphasia/dysarthria and bilateral upper extremities drift   -CTA Stroke MP with no acute intracranial process and no large vessel occlusion.   -Patient not a candidate for acute intervention at this time. No TNK based on eliquis use. No thrombectomy as no acute LVO on CTA and symptoms not concerning for LVO at this time. VAN negative.  -High suspicion for stroke mimic/post-ictal. No need for MRI Brain at this time, low suspicion for acute stroke due to history and assessment/presentation, however, primary team can obtain MRI Brain if they suspect acute stroke.   Recommend EEG to rule out any seizure.   -Case discussed with on-call attending    Thank you for including us in the care of this patient.  Vascular Neurology will sign off.  If the patient develops new symptoms concerning for acute stroke, please do not hesitate to re-consult.

## 2024-05-29 NOTE — PLAN OF CARE
Problem: Hemodialysis  Goal: Safe, Effective Therapy Delivery  Outcome: Progressing     Problem: Hemodialysis  Goal: Effective Tissue Perfusion  Outcome: Progressing     Problem: Hemodialysis  Goal: Absence of Infection Signs and Symptoms  Outcome: Progressing     Problem: Chronic Kidney Disease  Goal: Electrolyte Balance  Outcome: Progressing     Problem: Chronic Kidney Disease  Goal: Fluid Balance  Outcome: Progressing

## 2024-05-29 NOTE — ED NOTES
Telemetry Verification   Patient placed on Telemetry Box  Verified with War Room  Box # [id number]   Monitor Tech War room   Rate 90   Rhythm NSR

## 2024-05-29 NOTE — HOSPITAL COURSE
"Suyapa Connelly was placed in  observation for workup of encephalopathy. Patient continues to be confused and minimally engaged. She is able to follow commands with prompting. UA infectious, reflex culture pending. Start empiric rocephin - cultures did not isolate an organism in predominance. EEG obtained: "No epileptiform discharges, periodic discharges, lateralized rhythmic delta activity or electrographic seizures were seen. IMPRESSION: "Abnormal EEG due to a mild to moderate generalized cerebral dysfunction with no electrographic seizures or indications of seizure tendency."  MRI brain without acute changes, revealed "Overall degree of parenchymal volume loss has notably progressed when compared to the 05/06/2018 brain MRI. Neuropsychiatric consultation could be considered, as warranted clinically."    Medically ready to be discharged, discussed with  via phone call who initially wished to pursue NH placement. Discharge complicated as patient's family stopped answering the phone or all available numbers were out of service. APS report made per CM. Family came to bedside a few days later requesting to take the patient home - have long term plans to place her at [facility]. Per family patient's mental status had greatly improved since admission.       On day of discharge patient's vital signs were stable and patient appeared clinically ready for discharge. Hospital course, discharge plan and return precautions discussed - patient expressed understanding. All questions answered at that time.      Physical Exam  Vitals and nursing note reviewed.   Constitutional:       General: She is not in acute distress.     Appearance: She is well-developed. She is obese.   HENT:      Head: Normocephalic and atraumatic.   Cardiovascular:      Rate and Rhythm: Normal rate and regular rhythm.      Heart sounds: Normal heart sounds.   Pulmonary:      Effort: Pulmonary effort is normal. No respiratory distress. "   Abdominal:      General: There is no distension.   Musculoskeletal:         General: No tenderness. Normal range of motion.      Comments: Bilateral AKA    Skin:     General: Skin is warm and dry.      Findings: No rash.   Neurological:      Mental Status: She is alert and oriented to person, place, and time.      Comments: Unable to assess orientation, minimally verbal on exam. Denies pain.   Psychiatric:         Behavior: Behavior normal.         Thought Content: Thought content normal.         Judgment: Judgment normal.

## 2024-05-30 PROBLEM — R29.818 ACUTE FOCAL NEUROLOGICAL DEFICIT: Status: RESOLVED | Noted: 2024-05-29 | Resolved: 2024-05-30

## 2024-05-30 LAB
ALBUMIN SERPL BCP-MCNC: 2.8 G/DL (ref 3.5–5.2)
ALP SERPL-CCNC: 177 U/L (ref 55–135)
ALT SERPL W/O P-5'-P-CCNC: 13 U/L (ref 10–44)
AMMONIA PLAS-SCNC: 49 UMOL/L (ref 10–50)
ANION GAP SERPL CALC-SCNC: 13 MMOL/L (ref 8–16)
AST SERPL-CCNC: 16 U/L (ref 10–40)
BACTERIA UR CULT: NORMAL
BACTERIA UR CULT: NORMAL
BASOPHILS # BLD AUTO: 0.03 K/UL (ref 0–0.2)
BASOPHILS NFR BLD: 0.7 % (ref 0–1.9)
BILIRUB SERPL-MCNC: 1.1 MG/DL (ref 0.1–1)
BUN SERPL-MCNC: 44 MG/DL (ref 6–20)
CALCIUM SERPL-MCNC: 8.8 MG/DL (ref 8.7–10.5)
CHLORIDE SERPL-SCNC: 99 MMOL/L (ref 95–110)
CO2 SERPL-SCNC: 22 MMOL/L (ref 23–29)
CREAT SERPL-MCNC: 4.2 MG/DL (ref 0.5–1.4)
DIFFERENTIAL METHOD BLD: ABNORMAL
EOSINOPHIL # BLD AUTO: 0.2 K/UL (ref 0–0.5)
EOSINOPHIL NFR BLD: 3.3 % (ref 0–8)
ERYTHROCYTE [DISTWIDTH] IN BLOOD BY AUTOMATED COUNT: 18.4 % (ref 11.5–14.5)
EST. GFR  (NO RACE VARIABLE): 11.7 ML/MIN/1.73 M^2
GLUCOSE SERPL-MCNC: 166 MG/DL (ref 70–110)
HCT VFR BLD AUTO: 33.3 % (ref 37–48.5)
HGB BLD-MCNC: 10.7 G/DL (ref 12–16)
IMM GRANULOCYTES # BLD AUTO: 0.01 K/UL (ref 0–0.04)
IMM GRANULOCYTES NFR BLD AUTO: 0.2 % (ref 0–0.5)
LYMPHOCYTES # BLD AUTO: 0.6 K/UL (ref 1–4.8)
LYMPHOCYTES NFR BLD: 12.3 % (ref 18–48)
MAGNESIUM SERPL-MCNC: 1.9 MG/DL (ref 1.6–2.6)
MCH RBC QN AUTO: 31.6 PG (ref 27–31)
MCHC RBC AUTO-ENTMCNC: 32.1 G/DL (ref 32–36)
MCV RBC AUTO: 98 FL (ref 82–98)
MONOCYTES # BLD AUTO: 0.6 K/UL (ref 0.3–1)
MONOCYTES NFR BLD: 13.1 % (ref 4–15)
NEUTROPHILS # BLD AUTO: 3.2 K/UL (ref 1.8–7.7)
NEUTROPHILS NFR BLD: 70.4 % (ref 38–73)
NRBC BLD-RTO: 0 /100 WBC
PHOSPHATE SERPL-MCNC: 4.3 MG/DL (ref 2.7–4.5)
PLATELET # BLD AUTO: 195 K/UL (ref 150–450)
PMV BLD AUTO: 11.2 FL (ref 9.2–12.9)
POCT GLUCOSE: 158 MG/DL (ref 70–110)
POCT GLUCOSE: 224 MG/DL (ref 70–110)
POCT GLUCOSE: 243 MG/DL (ref 70–110)
POCT GLUCOSE: 295 MG/DL (ref 70–110)
POTASSIUM SERPL-SCNC: 4 MMOL/L (ref 3.5–5.1)
PROT SERPL-MCNC: 7.9 G/DL (ref 6–8.4)
RBC # BLD AUTO: 3.39 M/UL (ref 4–5.4)
SODIUM SERPL-SCNC: 134 MMOL/L (ref 136–145)
WBC # BLD AUTO: 4.57 K/UL (ref 3.9–12.7)

## 2024-05-30 PROCEDURE — 84100 ASSAY OF PHOSPHORUS: CPT | Mod: HCNC

## 2024-05-30 PROCEDURE — 63600175 PHARM REV CODE 636 W HCPCS: Mod: HCNC

## 2024-05-30 PROCEDURE — 92523 SPEECH SOUND LANG COMPREHEN: CPT | Mod: HCNC

## 2024-05-30 PROCEDURE — 36415 COLL VENOUS BLD VENIPUNCTURE: CPT | Mod: HCNC

## 2024-05-30 PROCEDURE — 99214 OFFICE O/P EST MOD 30 MIN: CPT | Mod: HCNC,,, | Performed by: NURSE PRACTITIONER

## 2024-05-30 PROCEDURE — G0378 HOSPITAL OBSERVATION PER HR: HCPCS | Mod: HCNC

## 2024-05-30 PROCEDURE — 85025 COMPLETE CBC W/AUTO DIFF WBC: CPT | Mod: HCNC

## 2024-05-30 PROCEDURE — 25000003 PHARM REV CODE 250: Mod: HCNC

## 2024-05-30 PROCEDURE — 83735 ASSAY OF MAGNESIUM: CPT | Mod: HCNC

## 2024-05-30 PROCEDURE — 82140 ASSAY OF AMMONIA: CPT | Mod: HCNC

## 2024-05-30 PROCEDURE — 80053 COMPREHEN METABOLIC PANEL: CPT | Mod: HCNC

## 2024-05-30 PROCEDURE — 96372 THER/PROPH/DIAG INJ SC/IM: CPT

## 2024-05-30 RX ORDER — SODIUM CHLORIDE 9 MG/ML
INJECTION, SOLUTION INTRAVENOUS ONCE
Status: DISCONTINUED | OUTPATIENT
Start: 2024-05-31 | End: 2024-06-03 | Stop reason: HOSPADM

## 2024-05-30 RX ADMIN — CARVEDILOL 6.25 MG: 6.25 TABLET, FILM COATED ORAL at 09:05

## 2024-05-30 RX ADMIN — CLOPIDOGREL BISULFATE 75 MG: 75 TABLET ORAL at 09:05

## 2024-05-30 RX ADMIN — CALCIUM ACETATE 667 MG: 667 CAPSULE ORAL at 12:05

## 2024-05-30 RX ADMIN — INSULIN ASPART 3 UNITS: 100 INJECTION, SOLUTION INTRAVENOUS; SUBCUTANEOUS at 06:05

## 2024-05-30 RX ADMIN — HYDRALAZINE HYDROCHLORIDE 50 MG: 50 TABLET ORAL at 08:05

## 2024-05-30 RX ADMIN — CALCIUM ACETATE 667 MG: 667 CAPSULE ORAL at 05:05

## 2024-05-30 RX ADMIN — CALCIUM ACETATE 667 MG: 667 CAPSULE ORAL at 09:05

## 2024-05-30 RX ADMIN — HYDRALAZINE HYDROCHLORIDE 50 MG: 50 TABLET ORAL at 09:05

## 2024-05-30 RX ADMIN — APIXABAN 5 MG: 5 TABLET, FILM COATED ORAL at 08:05

## 2024-05-30 RX ADMIN — ALLOPURINOL 50 MG: 300 TABLET ORAL at 09:05

## 2024-05-30 RX ADMIN — ATORVASTATIN CALCIUM 80 MG: 40 TABLET, FILM COATED ORAL at 09:05

## 2024-05-30 RX ADMIN — HYDRALAZINE HYDROCHLORIDE 50 MG: 50 TABLET ORAL at 02:05

## 2024-05-30 RX ADMIN — SERTRALINE 100 MG: 100 TABLET, FILM COATED ORAL at 09:05

## 2024-05-30 RX ADMIN — FAMOTIDINE 20 MG: 20 TABLET ORAL at 09:05

## 2024-05-30 RX ADMIN — APIXABAN 5 MG: 5 TABLET, FILM COATED ORAL at 09:05

## 2024-05-30 RX ADMIN — CALCITRIOL 0.5 MCG: 0.5 CAPSULE, LIQUID FILLED ORAL at 09:05

## 2024-05-30 RX ADMIN — CEFTRIAXONE 1 G: 1 INJECTION, POWDER, FOR SOLUTION INTRAMUSCULAR; INTRAVENOUS at 09:05

## 2024-05-30 RX ADMIN — INSULIN ASPART 1 UNITS: 100 INJECTION, SOLUTION INTRAVENOUS; SUBCUTANEOUS at 10:05

## 2024-05-30 RX ADMIN — CARVEDILOL 6.25 MG: 6.25 TABLET, FILM COATED ORAL at 08:05

## 2024-05-30 NOTE — PT/OT/SLP EVAL
Speech Language Pathology Evaluation  Cognitive Communication    Patient Name:  Suyapa Connelly   MRN:  [id number]  Admitting Diagnosis: Acute encephalopathy    Recommendations:     Recommendations:                General Recommendations:  Dysphagia therapy, Speech/language therapy, and Cognitive-linguistic therapy  Diet recommendations:  Minced & Moist Diet - IDDSI Level 5, Thin liquids - IDDSI Level 0 Please avoid mixed consistencies (such as cereals, soups with large pieces of meat/vegetable, etc.), avoid dry/coarse/crumbly items such as rice ,nuts, seeds, dried fruit, coconut, avoid fibrous foods, avoid tough skins, avoid tough vegetables (i.e. no corn, brussel sprouts,etc) avoid chewy/sticky foods (i.e. no peanut putter, caramel, licorice, etc.)    Aspiration Precautions: Strict 1:1 supervision with all PO required for safety, only when vital signs stable, only when awake/alert/attentive, only when accepting of PO, avoid straws, single bites/sips, 1 bite/sip at a time, Eliminate distractions, Frequent oral care, HOB to 90 degrees, Meds crushed in puree, Remain upright 30 minutes post meal, and Strict aspiration precautions. Continue to monitor for signs and symptoms of aspiration and discontinue oral feeding should you notice any of the following: watery eyes, reddened facial area, wet vocal quality, increased work of breathing, change in respiratory status, increased congestion, coughing, fever and/or change in level of alertness   General Precautions: Standard, aspiration, aphasia, fall  Communication strategies:  provide increased time to answer and go to room if call light pushed    Assessment:     Suyapa Connelly is a 58 y.o. female with an SLP diagnosis of Aphasia, Dysphagia, and Cognitive-Linguistic Impairment.  ST to continue to follow .    History:     Past Medical History:   Diagnosis Date    Anxiety     Chronic pain syndrome     CKD (chronic kidney disease), stage III     Depression     Diabetes  mellitus, type 2     GERD (gastroesophageal reflux disease)     Hyperemesis 03/23/2021    Hypokalemia 03/23/2021    Infection of below knee amputation stump 03/12/2022    Osteomyelitis     Osteomyelitis of left foot 04/30/2021    Ulcer of left foot     Vaginal delivery     x1       Past Surgical History:   Procedure Laterality Date    ABOVE-KNEE AMPUTATION Left 5/18/2021    Procedure: AMPUTATION, ABOVE KNEE;  Surgeon: Teddy Huber MD;  Location: [facility] OR [address] [place] [address];  Service: Vascular;  Laterality: Left;    ABOVE-KNEE AMPUTATION Right 3/18/2022    Procedure: AMPUTATION, ABOVE KNEE;  Surgeon: DAYNE Florez II, MD;  [facility];  Service: Vascular;  Laterality: Right;    Angiogram - Right Extremity Right 7/9/15    angiogram-left leg  10/6/15    ANGIOGRAPHY OF LOWER EXTREMITY Left 4/29/2021    Procedure: ANGIOGRAM, LOWER EXTREMITY;  Surgeon: Teddy Huber MD;  [facility];  Service: Vascular;  Laterality: Left;    BELOW KNEE AMPUTATION OF LOWER EXTREMITY Right 12/28/2021    Procedure: AMPUTATION, BELOW KNEE;  Surgeon: Kaitlyn Rojas MD;  Location: [facility] OR;  Service: General;  Laterality: Right;    CATHETERIZATION OF BOTH LEFT AND RIGHT HEART N/A 12/18/2019    Procedure: CATHETERIZATION, HEART, BOTH LEFT AND RIGHT;  Surgeon: Que Fernando III, MD;  Location: [facility] CATH LAB;  Service: Cardiology;  Laterality: N/A;    CORONARY ANGIOGRAPHY N/A 12/18/2019    Procedure: ANGIOGRAM, CORONARY ARTERY;  Surgeon: Que Fernando III, MD;  Location: [facility] CATH LAB;  Service: Cardiology;  Laterality: N/A;    CORONARY ANGIOGRAPHY INCLUDING BYPASS GRAFTS WITH CATHETERIZATION OF LEFT HEART N/A 7/28/2020    Procedure: ANGIOGRAM, CORONARY, INCLUDING BYPASS GRAFT, WITH LEFT HEART CATHETERIZATION, 9 am;  Surgeon: Rachel Easley MD;  Location: [facility] CATH LAB;  Service: Cardiology;  Laterality: N/A;    CORONARY ARTERY BYPASS GRAFT (CABG) N/A 1/14/2020    Procedure: CORONARY ARTERY BYPASS  GRAFT (CABG) x 1     Off Pump;  Surgeon: Huang Altamirano MD;  Location: [address];  Service: Cardiovascular;  Laterality: N/A;    CREATION OF FEMORAL-TIBIAL ARTERY BYPASS Left 4/29/2021    Procedure: CREATION, BYPASS, ARTERIAL, FEMORAL TO ANTERIOR TIBIAL;  Surgeon: Teddy Huber MD;  Location: [address];  Service: Vascular;  Laterality: Left;    CREATION OF FEMOROPOPLITEAL ARTERIAL BYPASS USING GRAFT Left 8/18/2020    Procedure: CREATION, BYPASS, ARTERIAL, FEMORAL TO POPLITEAL, USING GRAFT, LEFT LOWER EXTREMITY;  Surgeon: Teddy Huber MD;  Location: [facility];  Service: Vascular;  Laterality: Left;  REQUEST 7:15 A.M. START----COVID NEGATIVE ON 8/17 1ST CASE STARTE PER LEANA ON 8/7/2020 @ 942AM-  RN PREOP 8/12/2020   T/S-----CLEARED BY CARDS-------PENDING INSURANCE    DEBRIDEMENT OF FOOT Left 9/8/2020    Procedure: DEBRIDEMENT, FOOT;  Surgeon: Roiso Mayes DPM;  Location: [facility];  Service: Podiatry;  Laterality: Left;  request neoxx .   RN Pre Op 9-4-2020, Covid negative on 9/5/20. C A    DEBRIDEMENT OF FOOT  3/4/2021    Procedure: DEBRIDEMENT, FOOT;  Surgeon: Teddy Huber MD;  Location: [facility] OR;  Service: Vascular;;    DEBRIDEMENT OF FOOT Left 3/9/2021    Procedure: DEBRIDEMENT, FOOT, bone biopsy;  Surgeon: Rosio Mayes DPM;  Location: [facility] OR;  Service: Podiatry;  Laterality: Left;  Request neoxx---COVID IN AM  REQUESTING NOON START  RN Phone Pre op.On Blood thinners Plavix and Eliquis.  Covid am of surgery. C A    DEBRIDEMENT OF FOOT Left 5/4/2021    Procedure: DEBRIDEMENT, FOOT;  Surgeon: Farooq Morley DPM;  Location: [address];  Service: Podiatry;  Laterality: Left;    INSERTION OF TUNNELED CENTRAL VENOUS HEMODIALYSIS CATHETER N/A 1/27/2020    Procedure: Insertion, Catheter, Central Venous, Hemodialysis;  Surgeon: ESTEBAN Gomez III, MD;  Location: [facility] CATH LAB;  Service: Peripheral Vascular;  Laterality: N/A;    INSERTION OF TUNNELED CENTRAL VENOUS  HEMODIALYSIS CATHETER  5/10/2023    Procedure: Insertion, Catheter, Central Venous, Hemodialysis;  Surgeon: Romulo Queen MD;  Location: [facility] CATH LAB;  Service: Cardiology;;    PERCUTANEOUS TRANSLUMINAL ANGIOPLASTY N/A 3/4/2021    Procedure: PTA (ANGIOPLASTY, PERCUTANEOUS, TRANSLUMINAL);  Surgeon: Teddy Huber MD;  Location: [facility] OR;  Service: Vascular;  Laterality: N/A;    REMOVAL OF ARTERIOVENOUS GRAFT Left 5/27/2021    Procedure: REMOVAL, GRAFT, LEFT LOWER EXTREMITY, WOUND EXPLORATION;  Surgeon: Teddy Huber MD;  Location: [facility] OR 2ND FLR;  Service: Vascular;  Laterality: Left;    REMOVAL OF NAIL OF DIGIT Left 3/9/2021    Procedure: REMOVAL, NAIL, DIGIT;  Surgeon: Rosio Mayes DPM;  Location: [facility] OR;  Service: Podiatry;  Laterality: Left;    RIGHT HEART CATHETERIZATION Right 5/10/2023    Procedure: INSERTION, CATHETER, RIGHT HEART;  Surgeon: Romulo Queen MD;  Location: [facility] CATH LAB;  Service: Cardiology;  Laterality: Right;    THROMBECTOMY Left 3/4/2021    Procedure: THROMBECTOMY, LEFT LOWER EXTREMITY BYPASS GRAFT, ANGIOGRAM, POSSIBLE INTERVENTION, POSSIBLE LEFT LOWER EXTREMITY BYPASS;  Surgeon: Teddy Huber MD;  Location: [facility] OR;  Service: Vascular;  Laterality: Left;    THROMBECTOMY Left 4/29/2021    Procedure: GRAFT THROMBECTOMY, LEFT LOWER EXTREMITY;  Surgeon: Teddy Huber MD;  Location: [facility] OR 2ND FLR;  Service: Vascular;  Laterality: Left;  14.5 min  1179.85 mGy  341.01 Gycm2  240 ml dye    THROMBECTOMY  10/22/2021    Procedure: THROMBECTOMY;  Surgeon: Saad Arenas MD;  Location: [facility] CATH LAB/EP;  Service: Cardiology;;       Social History: Per chart, Patient lives with Spouse.    Prior Intubation HX:  none this admission     Modified Barium Swallow: none prior at this facility     Chest X-Rays: none recent     Subjective     SLP reviewed Pt with RN, RN confirmed Pt tolerated breakfast meal fed by RN, further explained Pt nonverbal with nursing this am  "  Pt presents emotionally labile  Pt with minimal verbal expression, some spontaneous speech including "yeah, and ""uh huh"    Pain/Comfort:  Pain Rating 1: other (see comments) (did not rate, difficult to assess 2/2 decreased receptive/expressive language)  Pain Addressed 2: Nurse notified    Respiratory Status: Room air    Objective:     Cognitive Status:  Pt alert and oriented x0.  Note: minimal assessment due to decreased verbal expression and auditory comprehension. ST to continue to assess cognitive domains for problem solving, memory, sequencing and safety awareness in session.      Receptive Language:   Comprehension:      Questions Simple yes/no : DEP  Commands  One step DEP    Pragmatics:    Pt with inconsistent eye contact, flat affect and emotionally labile    Expressive Language:  Verbal:    Automatic Speech  Counting : unable to elicit provided max cues and Days of the week unable to elicit provided max cues  Initiation : unable to assess 2/2 minimal verbal expression elicited, ST to continue to assess in session    Naming Single word responsive naming : unable to elicit provided max cues  Conversational speech : unable to elicit. Pt mostly nonverbal though did demonstrate with intermittent spontaneous speech, including "uh huh" and "yeah."   Nonverbal:   SLP unable to elicit return demonstration of gestures provided max cues. SLP to continue to assess in session       Motor Speech:  Ongoing assessment warranted 2/2 minimal verbal expression elicited     Voice:   Ongoing assessment warranted 2/2 minimal verbal expression elicited     Visual-Spatial:  TBA    Reading:   TBA      Written Expression:   TBA    Treatment: Pt found upright in bed with call light in reach. She was tearful upon SLP entrance to room. Pt with minimal sustained eye contact and verbal expression. RN in room to review Pt during session and aware. She gestured to move bedside table away from her.  When asked about meals she did not " provide response via verbal or head nod response. She declined presentation of water via cup edge.  She was educated on SLP role, safety precautions, compensatory strategies for communication. She did not demonstrate understanding. No family present. Pt remained upright in bed in position as found with call light in reach upon SLP exit. Findings reviewed with MD team following session.     Goals:   Multidisciplinary Problems       SLP Goals          Problem: SLP    Goal Priority Disciplines Outcome   SLP Goal     SLP Progressing   Description: Speech Language Pathology Goals  Goals expected to be met by 6/5/24    1. Pt will participate in ongoing assessment of swallow function to determine safest, least restrictive means of nutrition/hydration  2. Educate Pt and family on aspiration precautions and SLP POC  3. Pt will follow simple commands with 60% accuracy or more, MAX A  4. Pt will answer personal yes/no questions 60% accuracy or higher, MAX A  5. Pt will participate in ongoing assessment of cognitive-linguistic skills to determine ongoing POC needs  6. Educate Pt and family on safety awareness and compensatory strategies for communication                          Plan:   Patient to be seen:  4 x/week   Plan of Care expires:  06/28/24  Plan of Care reviewed with:  patient   SLP Follow-Up:  Yes       Discharge recommendations:   pending progress     Time Tracking:     SLP Treatment Date:   05/30/24  Speech Start Time:  1121  Speech Stop Time:  1136     Speech Total Time (min):  15 min    Billable Minutes: Eval 15     05/30/2024

## 2024-05-30 NOTE — ASSESSMENT & PLAN NOTE
ESRD on iHD MWF  Dr. Jesika MATA TDC  + residual renal fx     Missed HD session on Monday prior to presentation.     Plan/Recommendations:     -Continue MWF iHD schedule while IP   -strict I/O's  -Renal diet when advanced  -Continue calcium acetate   -Hgb @ goal for ESRD, continue trending.  No need for NAEEM therapy.

## 2024-05-30 NOTE — PROGRESS NOTES
[facility]  Nephrology  Progress Note    Patient Name: Suyapa Connelly  MRN: [id number]  Admission Date: 5/28/2024  Hospital Length of Stay: 0 days  Attending Provider: Yury Daily MD   Primary Care Physician: Magen Christensen MD  Principal Problem:Acute encephalopathy    Subjective:     Interval History: HD yesterday, tolerated well. Net UF 1L.     Review of patient's allergies indicates:   Allergen Reactions    Ciprofloxacin Itching    Iodine      Kidney injury    Pcn [penicillins]      Rash; tolerated ceftriaxone on 1/13/20     Current Facility-Administered Medications   Medication Frequency    albuterol-ipratropium 2.5 mg-0.5 mg/3 mL nebulizer solution 3 mL Q4H PRN    allopurinol split tablet 50 mg Every other day    aluminum-magnesium hydroxide-simethicone 200-200-20 mg/5 mL suspension 30 mL QID PRN    apixaban tablet 5 mg BID    atorvastatin tablet 80 mg Daily    bisacodyL suppository 10 mg Daily PRN    calcitRIOL capsule 0.5 mcg Daily    calcium acetate(phosphat bind) capsule 667 mg TID WM    carvediloL tablet 6.25 mg BID    cefTRIAXone (Rocephin) 1 g in dextrose 5 % in water (D5W) 100 mL IVPB (MB+) Q24H    clopidogreL tablet 75 mg Daily    dextrose 10% bolus 125 mL 125 mL PRN    dextrose 10% bolus 250 mL 250 mL PRN    famotidine tablet 20 mg Daily    glucagon (human recombinant) injection 1 mg PRN    glucose chewable tablet 16 g PRN    glucose chewable tablet 24 g PRN    heparin (porcine) injection 1,000 Units PRN    hydrALAZINE injection 5 mg Q6H PRN    hydrALAZINE tablet 50 mg TID    insulin aspart U-100 pen 0-5 Units QID (AC + HS) PRN    melatonin tablet 6 mg Nightly PRN    naloxone 0.4 mg/mL injection 0.02 mg PRN    ondansetron disintegrating tablet 8 mg Q8H PRN    polyethylene glycol packet 17 g BID PRN    prochlorperazine injection Soln 5 mg Q6H PRN    sertraline tablet 100 mg Daily    simethicone chewable tablet 80 mg QID PRN    sodium chloride 0.9% flush 5 mL PRN        Objective:     Vital Signs (Most Recent):  Temp: 97.9 °F (36.6 °C) (05/30/24 0732)  Pulse: 84 (05/30/24 0732)  Resp: 18 (05/30/24 0732)  BP: 135/63 (05/30/24 0732)  SpO2: 95 % (05/30/24 0732) Vital Signs (24h Range):  Temp:  [97.4 °F (36.3 °C)-98.8 °F (37.1 °C)] 97.9 °F (36.6 °C)  Pulse:  [72-85] 84  Resp:  [16-18] 18  SpO2:  [94 %-98 %] 95 %  BP: (129-165)/(58-81) 135/63     Weight: 70.1 kg (154 lb 8.7 oz) (05/29/24 0350)  Body mass index is 47.16 kg/m².  Body surface area is 1.54 meters squared.    I/O last 3 completed shifts:  In: 650 [Other:650]  Out: 1650 [Other:1650]     Physical Exam  Vitals and nursing note reviewed.   Constitutional:       General: She is not in acute distress.     Appearance: She is ill-appearing.      Comments: Alert but minimally conversant, dysarthric    HENT:      Head: Normocephalic and atraumatic.      Nose: Nose normal.      Mouth/Throat:      Pharynx: No oropharyngeal exudate.   Eyes:      Extraocular Movements: Extraocular movements intact.   Cardiovascular:      Rate and Rhythm: Normal rate and regular rhythm.      Heart sounds: Normal heart sounds.   Pulmonary:      Effort: Pulmonary effort is normal. No respiratory distress.      Breath sounds: Normal breath sounds.   Abdominal:      General: Bowel sounds are normal. There is no distension.      Palpations: Abdomen is soft.      Tenderness: There is no abdominal tenderness.   Musculoskeletal:         General: Deformity present.      Cervical back: Normal range of motion and neck supple.      Comments: Bilateral AKA  No pitting edema   Skin:     General: Skin is warm and dry.   Neurological:      General: No focal deficit present.      Mental Status: She is alert.      Motor: Weakness present.      Comments: Oriented to self and can identify  by name. Disoriented to place, situation, time  Dysarthric and aphasic   Follows some commands, requires repeated prompting    Able to sit herself up in bed, equal   strength bilaterally but generally weak    Psychiatric:         Mood and Affect: Affect is tearful.          Significant Labs:  CBC:   Recent Labs   Lab 05/30/24  0544   WBC 4.57   RBC 3.39*   HGB 10.7*   HCT 33.3*      MCV 98   MCH 31.6*   MCHC 32.1     CMP:   Recent Labs   Lab 05/30/24  0544   *   CALCIUM 8.8   ALBUMIN 2.8*   PROT 7.9   *   K 4.0   CO2 22*   CL 99   BUN 44*   CREATININE 4.2*   ALKPHOS 177*   ALT 13   AST 16   BILITOT 1.1*     All labs within the past 24 hours have been reviewed.       Assessment/Plan:     Neuro  * Acute encephalopathy  -management per primary     Renal/  ESRD (end stage renal disease)  ESRD on iHD MWF  Dr. Jesika MATA TDC  + residual renal fx     Missed HD session on Monday prior to presentation.     Plan/Recommendations:     -Continue MWF iHD schedule while IP   -strict I/O's  -Renal diet when advanced  -Continue calcium acetate   -Hgb @ goal for ESRD, continue trending.  No need for NAEEM therapy.        Thank you for your consult. I will follow-up with patient. Please contact us if you have any additional questions.    Katie Monique DNP  Nephrology  [facility]

## 2024-05-30 NOTE — NURSING
Nurses Note -- 4 Eyes      5/30/2024   2:04 PM      Skin assessed during: Daily Assessment      [] No Altered Skin Integrity Present    []Prevention Measures Documented      [x] Yes- Altered Skin Integrity Present or Discovered   [] LDA Added if Not in Epic (Describe Wound)   [] New Altered Skin Integrity was Present on Admit and Documented in LDA   [] Wound Image Taken    Wound Care Consulted? No    Attending Nurse:  Annette Lin RN/Staff Member:   Jo Ann    Wound consulted on admit.

## 2024-05-30 NOTE — NURSING
Nurses Note -- 4 Eyes      5/29/2024   7:06 PM      Skin assessed during: Daily Assessment      [] No Altered Skin Integrity Present    []Prevention Measures Documented      [x] Yes- Altered Skin Integrity Present or Discovered   [x] LDA Added if Not in Epic (Describe Wound)   [x] New Altered Skin Integrity was Present on Admit and Documented in LDA   [x] Wound Image Taken    Wound Care Consulted? Yes    Attending Nurse:  Cecilia Lin RN/Staff Member:   Jo Ann

## 2024-05-30 NOTE — SUBJECTIVE & OBJECTIVE
Interval History: HD yesterday, tolerated well. Net UF 1L.     Review of patient's allergies indicates:   Allergen Reactions    Ciprofloxacin Itching    Iodine      Kidney injury    Pcn [penicillins]      Rash; tolerated ceftriaxone on 1/13/20     Current Facility-Administered Medications   Medication Frequency    albuterol-ipratropium 2.5 mg-0.5 mg/3 mL nebulizer solution 3 mL Q4H PRN    allopurinol split tablet 50 mg Every other day    aluminum-magnesium hydroxide-simethicone 200-200-20 mg/5 mL suspension 30 mL QID PRN    apixaban tablet 5 mg BID    atorvastatin tablet 80 mg Daily    bisacodyL suppository 10 mg Daily PRN    calcitRIOL capsule 0.5 mcg Daily    calcium acetate(phosphat bind) capsule 667 mg TID WM    carvediloL tablet 6.25 mg BID    cefTRIAXone (Rocephin) 1 g in dextrose 5 % in water (D5W) 100 mL IVPB (MB+) Q24H    clopidogreL tablet 75 mg Daily    dextrose 10% bolus 125 mL 125 mL PRN    dextrose 10% bolus 250 mL 250 mL PRN    famotidine tablet 20 mg Daily    glucagon (human recombinant) injection 1 mg PRN    glucose chewable tablet 16 g PRN    glucose chewable tablet 24 g PRN    heparin (porcine) injection 1,000 Units PRN    hydrALAZINE injection 5 mg Q6H PRN    hydrALAZINE tablet 50 mg TID    insulin aspart U-100 pen 0-5 Units QID (AC + HS) PRN    melatonin tablet 6 mg Nightly PRN    naloxone 0.4 mg/mL injection 0.02 mg PRN    ondansetron disintegrating tablet 8 mg Q8H PRN    polyethylene glycol packet 17 g BID PRN    prochlorperazine injection Soln 5 mg Q6H PRN    sertraline tablet 100 mg Daily    simethicone chewable tablet 80 mg QID PRN    sodium chloride 0.9% flush 5 mL PRN       Objective:     Vital Signs (Most Recent):  Temp: 97.9 °F (36.6 °C) (05/30/24 0732)  Pulse: 84 (05/30/24 0732)  Resp: 18 (05/30/24 0732)  BP: 135/63 (05/30/24 0732)  SpO2: 95 % (05/30/24 0732) Vital Signs (24h Range):  Temp:  [97.4 °F (36.3 °C)-98.8 °F (37.1 °C)] 97.9 °F (36.6 °C)  Pulse:  [72-85] 84  Resp:  [16-18]  18  SpO2:  [94 %-98 %] 95 %  BP: (129-165)/(58-81) 135/63     Weight: 70.1 kg (154 lb 8.7 oz) (05/29/24 0350)  Body mass index is 47.16 kg/m².  Body surface area is 1.54 meters squared.    I/O last 3 completed shifts:  In: 650 [Other:650]  Out: 1650 [Other:1650]     Physical Exam  Vitals and nursing note reviewed.   Constitutional:       General: She is not in acute distress.     Appearance: She is ill-appearing.      Comments: Alert but minimally conversant, dysarthric    HENT:      Head: Normocephalic and atraumatic.      Nose: Nose normal.      Mouth/Throat:      Pharynx: No oropharyngeal exudate.   Eyes:      Extraocular Movements: Extraocular movements intact.   Cardiovascular:      Rate and Rhythm: Normal rate and regular rhythm.      Heart sounds: Normal heart sounds.   Pulmonary:      Effort: Pulmonary effort is normal. No respiratory distress.      Breath sounds: Normal breath sounds.   Abdominal:      General: Bowel sounds are normal. There is no distension.      Palpations: Abdomen is soft.      Tenderness: There is no abdominal tenderness.   Musculoskeletal:         General: Deformity present.      Cervical back: Normal range of motion and neck supple.      Comments: Bilateral AKA  No pitting edema   Skin:     General: Skin is warm and dry.   Neurological:      General: No focal deficit present.      Mental Status: She is alert.      Motor: Weakness present.      Comments: Oriented to self and can identify  by name. Disoriented to place, situation, time  Dysarthric and aphasic   Follows some commands, requires repeated prompting    Able to sit herself up in bed, equal  strength bilaterally but generally weak    Psychiatric:         Mood and Affect: Affect is tearful.          Significant Labs:  CBC:   Recent Labs   Lab 05/30/24  0544   WBC 4.57   RBC 3.39*   HGB 10.7*   HCT 33.3*      MCV 98   MCH 31.6*   MCHC 32.1     CMP:   Recent Labs   Lab 05/30/24  0544   *   CALCIUM 8.8    ALBUMIN 2.8*   PROT 7.9   *   K 4.0   CO2 22*   CL 99   BUN 44*   CREATININE 4.2*   ALKPHOS 177*   ALT 13   AST 16   BILITOT 1.1*     All labs within the past 24 hours have been reviewed.

## 2024-05-30 NOTE — SUBJECTIVE & OBJECTIVE
Interval History:  NAEON. AFVSS. MRI brain negative for acute processes; did reveal. Overall degree of parenchymal volume loss has notably progressed when compared to the 05/06/2018 brain MRI. Neuropsychiatric consultation could be considered. Urine culture with multiple organisms, none in predominance. Will continue to treat with IV ceftriaxone x3 days for empiric coverage.    Patient evaluated at bedside, non-verbal but does make eye contact when called by name. Discussed negative findings with . Explained that she likely will continue to have recurrent UTIs due to her cognitive decline and inability to perform ADLs. Reports he would like to pursue placement for her as her care exceeds his abilities. CM updated.     Review of Systems   Unable to perform ROS: Dementia     Objective:     Vital Signs (Most Recent):  Temp: 98.2 °F (36.8 °C) (05/30/24 1147)  Pulse: 85 (05/30/24 1540)  Resp: 18 (05/30/24 1147)  BP: 127/66 (05/30/24 1438)  SpO2: (!) 94 % (05/30/24 1147) Vital Signs (24h Range):  Temp:  [97.4 °F (36.3 °C)-98.7 °F (37.1 °C)] 98.2 °F (36.8 °C)  Pulse:  [75-87] 85  Resp:  [16-18] 18  SpO2:  [94 %-98 %] 94 %  BP: (126-165)/(58-79) 127/66     Weight: 70.1 kg (154 lb 8.7 oz)  Body mass index is 47.16 kg/m².    Intake/Output Summary (Last 24 hours) at 5/30/2024 1554  Last data filed at 5/29/2024 1815  Gross per 24 hour   Intake 650 ml   Output 1650 ml   Net -1000 ml         Physical Exam  Vitals and nursing note reviewed.   Constitutional:       General: She is not in acute distress.     Appearance: She is well-developed. She is obese.   HENT:      Head: Normocephalic and atraumatic.   Cardiovascular:      Rate and Rhythm: Normal rate and regular rhythm.      Heart sounds: Normal heart sounds.   Pulmonary:      Effort: Pulmonary effort is normal. No respiratory distress.   Abdominal:      General: There is no distension.   Musculoskeletal:         General: No tenderness. Normal range of motion.       Comments: Bilateral AKA    Skin:     General: Skin is warm and dry.      Findings: No rash.   Neurological:      Mental Status: She is alert and oriented to person, place, and time.      Comments: Unable to assess orientation, non-verbal. Makes eye contact when name called   Psychiatric:         Behavior: Behavior normal.         Thought Content: Thought content normal.         Judgment: Judgment normal.       Significant Labs: All pertinent labs within the past 24 hours have been reviewed.    Significant Imaging: I have reviewed all pertinent imaging results/findings within the past 24 hours.

## 2024-05-30 NOTE — ASSESSMENT & PLAN NOTE
58F presenting after an episode of full body rigidity, bowel incontinence, and now persistent confusion. Se was a stroke code on arrival to ED, CTA with no acute LVO, vascular neurology recommending admission/EEG. No seizure history.    - Afebrile and HDS on admission  - Elevated BUN on admission, nephrology consult for HD  - EEG ordered: No epileptiform discharges, periodic discharges, lateralized rhythmic delta activity or electrographic seizures were seen.  IMPRESSION: Abnormal EEG due to a mild to moderate generalized cerebral dysfunction with no electrographic seizures or indications of seizure tendency.  - Seizure, aspiration, fall precautions  - Neuro checks q4h  - SLP consult   Minced & Moist Diet - IDDSI Level 5, Thin liquids - IDDSI Level 0   - UDS negative  - Ammonia wnl   - UA infectious, but culture with no organisms in predominance   - treated with rocephin x3 days  - MRI brain ordered, no acute process

## 2024-05-30 NOTE — PLAN OF CARE
Problem: Adult Inpatient Plan of Care  Goal: Plan of Care Review  Outcome: Progressing  Goal: Patient-Specific Goal (Individualized)  Outcome: Progressing  Goal: Absence of Hospital-Acquired Illness or Injury  Outcome: Progressing  Goal: Optimal Comfort and Wellbeing  Outcome: Progressing  Goal: Readiness for Transition of Care  Outcome: Progressing     Problem: Bariatric Environmental Safety  Goal: Safety Maintained with Care  Outcome: Progressing     Problem: Infection  Goal: Absence of Infection Signs and Symptoms  Outcome: Progressing     Problem: Wound  Goal: Optimal Coping  Outcome: Progressing  Goal: Optimal Functional Ability  Outcome: Progressing  Goal: Absence of Infection Signs and Symptoms  Outcome: Progressing  Goal: Improved Oral Intake  Outcome: Progressing  Goal: Optimal Pain Control and Function  Outcome: Progressing  Goal: Skin Health and Integrity  Outcome: Progressing  Goal: Optimal Wound Healing  Outcome: Progressing     Problem: Sepsis/Septic Shock  Goal: Optimal Coping  Outcome: Progressing  Goal: Absence of Bleeding  Outcome: Progressing  Goal: Blood Glucose Level Within Targeted Range  Outcome: Progressing  Goal: Absence of Infection Signs and Symptoms  Outcome: Progressing  Goal: Optimal Nutrition Intake  Outcome: Progressing     Problem: Diabetes Comorbidity  Goal: Blood Glucose Level Within Targeted Range  Outcome: Progressing     Problem: Skin Injury Risk Increased  Goal: Skin Health and Integrity  Outcome: Progressing     Problem: Hemodialysis  Goal: Safe, Effective Therapy Delivery  Outcome: Progressing  Goal: Effective Tissue Perfusion  Outcome: Progressing  Goal: Absence of Infection Signs and Symptoms  Outcome: Progressing     Problem: Chronic Kidney Disease  Goal: Optimal Coping with Chronic Illness  Outcome: Progressing  Goal: Electrolyte Balance  Outcome: Progressing  Goal: Fluid Balance  Outcome: Progressing

## 2024-05-30 NOTE — CONSULTS
[facility] - [facility]  Wound Care    Patient Name:  Suyapa Connelly   MRN:  [id number]  Date: 5/30/2024  Diagnosis: Acute encephalopathy    History:     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    Osteomyelitis     Osteomyelitis of left foot 04/30/2021    Ulcer of left foot     Vaginal delivery     x1       Social History     Socioeconomic History    Marital status:    Occupational History    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      Social Determinants of Health     Financial Resource Strain: Low Risk  (5/10/2024)    Overall Financial Resource Strain (CARDIA)     Difficulty of Paying Living Expenses: Not hard at all   Food Insecurity: No Food Insecurity (5/10/2024)    Hunger Vital Sign     Worried About Running Out of Food in the Last Year: Never true     Ran Out of Food in the Last Year: Never true   Transportation Needs: No Transportation Needs (5/10/2024)    TRANSPORTATION NEEDS     Transportation : No   Physical Activity: Inactive (5/10/2024)    Exercise Vital Sign     Days of Exercise per Week: 0 days     Minutes of Exercise per Session: 0 min   Stress: No Stress Concern Present (5/10/2024)    [employer] [place] [employer] - Occupational Stress Questionnaire     Feeling of Stress : Not at all   Housing Stability: Low Risk  (5/10/2024)    Housing Stability Vital Sign     Unable to Pay for Housing in the Last Year: No     Homeless in the Last Year: No        Precautions:     Allergies as of 05/28/2024 - Reviewed 05/28/2024   Allergen Reaction Noted    Ciprofloxacin Itching 07/18/2020    Iodine  01/10/2020    Pcn [penicillins]  11/14/2014       [facility] Assessment Details/Treatment     Patient seen for wound care consultation for sacrum - POA to [facility].   Reviewed chart for this encounter.   See Flow Sheet for findings.     Pt known to wound care from recent admission. Pt found lying in bed, agreeable to care at this time. Pt turned into lateral position for assessment.  Sacral region w/ continued incontinence associated dermatitis superimposed on possible pressure injury.  Pt w/ unstagable wound to L upper buttock, wound bed primarily covered w/ eschar.  Suspect wounds are r/t pressure as pt is wheelchair bound. Will order Vashe for cleansing given antimicrobial properties and Triad to support autolytic debridement as well as barrier protection. Immerse surface ordered.     RECOMMENDATIONS:  BID/PRN - sacrum, L upper buttocks - cleanse gently w/ Vashe, pat dry and apply Triad to affected areas, leave MACKENZIE.     Place pt on immerse surface.     Discussed POC with patient and primary nurse.   See EMR for orders & patient education.     Bedside nursing to continue care & monitoring.  Bedside nursing to maintain pressure injury prevention interventions. Skin integrity AVA notified     05/30/24 1154   WOCN Assessment   WOCN Total Time (mins) 15   Visit Date 05/30/24   Visit Time 1154   Consult Type New   WOCN Speciality Wound   Intervention assessed;changed;applied;chart review;coordination of care;orders   Teaching on-going        Wound 05/08/24 0054 Moisture associated dermatitis Sacral spine   Date First Assessed/Time First Assessed: 05/08/24 0054   Present on Original Admission: Yes  Primary Wound Type: Moisture associated dermatitis  Location: Sacral spine   Wound Image    Dressing Appearance Intact;Moist drainage   Drainage Amount Small   Drainage Characteristics/Odor  Serosanguineous   Appearance Pink;Red;Slough   Periwound Area Intact;Moist   Wound Edges Irregular;Open   Care Cleansed with:;Soap and water;Applied:;Skin Barrier        Wound 05/09/24 1200 Pressure Injury Left Buttocks   Date First Assessed/Time First Assessed: 05/09/24 1200   Present on Original Admission: Yes  Primary Wound Type: Pressure Injury  Side: Left  Location: Buttocks   Wound Image    Pressure Injury Stage U   Dressing Appearance Open to air   Drainage Amount None   Drainage Characteristics/Odor No odor   Appearance Intact;Dry;Eschar   Tissue loss description Full thickness   Periwound Area Intact;Dry   Care Cleansed with:;Soap and water;Applied:;Skin Barrier

## 2024-05-30 NOTE — PROGRESS NOTES
"[facility] [address] [place] [address] Medicine  Progress Note    Patient Name: Suyapa Connelly  MRN: [id number]  Patient Class: OP- Observation   Admission Date: 5/28/2024  Length of Stay: 0 days  Attending Physician: Yury Daily MD  Primary Care Provider: Magen Christensen MD    Subjective:     Principal Problem:Acute encephalopathy    HPI:  Suyapa Connelly is a 58 y.o. female with PMHx of b/l AKAs, ESRD on HD, CAD s/p CABG, afib on eliquis, HTN here with AMS.  at bedside assists with history. He reports patient was in her normal state of health until yesterday afternoon when they were driving and stopped at a gas station. She suddenly became rigid with her arms extending outwards and her head back with her eyes rolling backwards then suddenly relaxed and had a large bowel movement. Since this episode she has been confused and altered with abnormal speech and generalized weakness.  has never witnessed a similar episode. She skipped HD Monday, last full session Friday. History from patient limited. She follows some commands and is tearful ad reports she is afraid of being in the hospital, does not answer questions.    ED: HR in 90s and briefly hypertensive but otherwise HDS and afebrile. K 5.2. bicarb 20. Glucose 264. BUN 74. Cr 5.8. Albumin 1.1. T bili 1.1. . Hbg 11.3. She was a stroke code in the ED with NIHSS 5 and had CTA without acute intracranial process. Vascular neurology felt symptoms were stroke mimic/post ictal and recommended EEG/admission. She continue to be encephalopathic in ED and was admitted to .     Overview/Hospital Course:  Suyapa Connelly was placed in  observation for workup of encephalopathy. Patient continues to be confused and minimally engaged. She is able to follow commands with prompting. UA infectious, reflex culture pending. Start empiric rocephin. EEG obtained: "No epileptiform discharges, periodic discharges, lateralized rhythmic delta activity or " "electrographic seizures were seen. IMPRESSION:   Abnormal EEG due to a mild to moderate generalized cerebral dysfunction with no electrographic seizures or indications of seizure tendency."  Consider neurology consult if no response to abx treatment.    Interval History:  NAEON. AFVSS. MRI brain negative for acute processes; did reveal. Overall degree of parenchymal volume loss has notably progressed when compared to the 05/06/2018 brain MRI. Neuropsychiatric consultation could be considered. Urine culture with multiple organisms, none in predominance. Will continue to treat with IV ceftriaxone x3 days for empiric coverage.    Patient evaluated at bedside, non-verbal but does make eye contact when called by name. Discussed negative findings with . Explained that she likely will continue to have recurrent UTIs due to her cognitive decline and inability to perform ADLs. Reports he would like to pursue placement for her as her care exceeds his abilities. CM updated.     Review of Systems   Unable to perform ROS: Dementia     Objective:     Vital Signs (Most Recent):  Temp: 98.2 °F (36.8 °C) (05/30/24 1147)  Pulse: 85 (05/30/24 1540)  Resp: 18 (05/30/24 1147)  BP: 127/66 (05/30/24 1438)  SpO2: (!) 94 % (05/30/24 1147) Vital Signs (24h Range):  Temp:  [97.4 °F (36.3 °C)-98.7 °F (37.1 °C)] 98.2 °F (36.8 °C)  Pulse:  [75-87] 85  Resp:  [16-18] 18  SpO2:  [94 %-98 %] 94 %  BP: (126-165)/(58-79) 127/66     Weight: 70.1 kg (154 lb 8.7 oz)  Body mass index is 47.16 kg/m².    Intake/Output Summary (Last 24 hours) at 5/30/2024 1554  Last data filed at 5/29/2024 1815  Gross per 24 hour   Intake 650 ml   Output 1650 ml   Net -1000 ml         Physical Exam  Vitals and nursing note reviewed.   Constitutional:       General: She is not in acute distress.     Appearance: She is well-developed. She is obese.   HENT:      Head: Normocephalic and atraumatic.   Cardiovascular:      Rate and Rhythm: Normal rate and regular rhythm. "      Heart sounds: Normal heart sounds.   Pulmonary:      Effort: Pulmonary effort is normal. No respiratory distress.   Abdominal:      General: There is no distension.   Musculoskeletal:         General: No tenderness. Normal range of motion.      Comments: Bilateral AKA    Skin:     General: Skin is warm and dry.      Findings: No rash.   Neurological:      Mental Status: She is alert and oriented to person, place, and time.      Comments: Unable to assess orientation, non-verbal. Makes eye contact when name called   Psychiatric:         Behavior: Behavior normal.         Thought Content: Thought content normal.         Judgment: Judgment normal.       Significant Labs: All pertinent labs within the past 24 hours have been reviewed.    Significant Imaging: I have reviewed all pertinent imaging results/findings within the past 24 hours.    Assessment/Plan:      * Acute encephalopathy  58F presenting after an episode of full body rigidity, bowel incontinence, and now persistent confusion. Se was a stroke code on arrival to ED, CTA with no acute LVO, vascular neurology recommending admission/EEG. No seizure history.    - Afebrile and HDS on admission  - Elevated BUN on admission, nephrology consult for HD  - EEG ordered: No epileptiform discharges, periodic discharges, lateralized rhythmic delta activity or electrographic seizures were seen.  IMPRESSION: Abnormal EEG due to a mild to moderate generalized cerebral dysfunction with no electrographic seizures or indications of seizure tendency.  - Seizure, aspiration, fall precautions  - Neuro checks q4h  - SLP consult   Minced & Moist Diet - IDDSI Level 5, Thin liquids - IDDSI Level 0   - UDS negative  - Ammonia wnl   - UA infectious, but culture with no organisms in predominance   - treated with rocephin x3 days  - MRI brain ordered, no acute process    Severe obesity (BMI >= 40)  Body mass index is 46.38 kg/m². Morbid obesity complicates all aspects of disease  management from diagnostic modalities to treatment.    ESRD (end stage renal disease)  - HD MWF, missed Monday  - Nephrology consulted for HD    S/P AKA (above knee amputation) bilateral  - Noted. Fall precautions.    Chronic combined systolic and diastolic heart failure  Patient is identified as having Combined Systolic and Diastolic heart failure that is Chronic. Patient is off CHF pathway.CHF is currently controlled. Continue Beta Blocker and Nitrate/Vasodilator. Volume management with HD.  - Place on fluid restriction of 1.5 L. Continue to stress to patient importance of self efficacy and  on diet for CHF.   - Monitor clinical status closely. Monitor on telemetry.   - Monitor strict Is&Os and daily weights.    Results for orders placed during the hospital encounter of 05/07/24    Echo    Interpretation Summary    Left Ventricle: The left ventricle is mildly dilated. Ventricular mass is normal. Normal wall thickness. Severe global hypokinesis present. There is severely reduced systolic function. Ejection fraction by visual approximation is 15%. There is diastolic dysfunction.    Right Ventricle: Severe right ventricular enlargement. Wall thickness is normal. Right ventricle wall motion has global hypokinesis. Systolic function is mildly reduced.    Left Atrium: Left atrium is severely dilated. There is an atrial septal aneurysm.    Right Atrium: Right atrium is dilated.    Aortic Valve: There is mild aortic regurgitation.    Mitral Valve: There is mild regurgitation.    Tricuspid Valve: There is annular dilation present. There is normal leaflet mobility. There is severe functional regurgitation.    Pulmonary Artery: The estimated pulmonary artery systolic pressure is at least 24 mmHg. PASP is likely under-estimated in the setting of severe tricuspid regurgitation.    IVC/SVC: Central venous pressure of at least 8 mmHg.    No vegetation seen.       Paroxysmal atrial fibrillation  Patient with Paroxysmal (<7  days) atrial fibrillation which is controlled currently with Beta Blocker. Patient is currently in sinus rhythm.[id number] Score: 3. Anticoagulation indicated. Anticoagulation done with eliquis .    Anemia due to chronic kidney disease, on chronic dialysis  Patient's anemia is currently controlled. Has not received any PRBCs to date. Etiology likely d/t chronic disease due to ESRD  Current CBC reviewed-   Lab Results   Component Value Date    HGB 11.3 (L) 05/28/2024    HCT 33.6 (L) 05/28/2024     Monitor serial CBC and transfuse if patient becomes hemodynamically unstable, symptomatic or H/H drops below 7/21.    CAD (coronary artery disease)  Patient with known CAD s/p CABG, which is controlled Will continue Plavix and Statin and monitor for S/Sx of angina/ACS. Continue to monitor on telemetry.     Type 2 diabetes mellitus with hyperglycemia, with long-term current use of insulin  Patient's FSGs are uncontrolled due to hyperglycemia on current medication regimen.  Last A1c reviewed-   Lab Results   Component Value Date    HGBA1C 8.0 (H) 04/24/2024     Most recent fingerstick glucose reviewed-   Recent Labs   Lab 05/28/24  2142   POCTGLUCOSE 284*     Current correctional scale  Low  Maintain anti-hyperglycemic dose as follows-   Antihyperglycemics (From admission, onward)      Start     Stop Route Frequency Ordered    05/29/24 0101  insulin aspart U-100 pen 0-5 Units         -- SubQ Before meals & nightly PRN 05/29/24 0001        Hold Oral hypoglycemics while patient is in the hospital.   DM diet when passes quesada    CAROLIN (generalized anxiety disorder)  - Continue home sertraline       VTE Risk Mitigation (From admission, onward)           Ordered     heparin (porcine) injection 1,000 Units  As needed (PRN)         05/29/24 1601     apixaban tablet 5 mg  2 times daily         05/29/24 0036     Reason for No Pharmacological VTE Prophylaxis  Once        Question:  Reasons:  Answer:  Already adequately anticoagulated on  oral Anticoagulants    05/29/24 0001     IP VTE HIGH RISK PATIENT  Once         05/29/24 0001     Reason for no Mechanical VTE Prophylaxis  Once        Comments: Bilateral AKA, on eliquis   Question:  Reasons:  Answer:  Physician Provided (leave comment)    05/29/24 0001                    Discharge Planning   DEVAN: 5/31/2024     Code Status: Full Code   Is the patient medically ready for discharge?:     Reason for patient still in hospital (select all that apply): Pending disposition  Discharge Plan A: Home with family        Aleta Rod PA-C  Department of Hospital Medicine   [facility]

## 2024-05-30 NOTE — PLAN OF CARE
Problem: SLP  Goal: SLP Goal  Description: Speech Language Pathology Goals  Goals expected to be met by 6/5/24    1. Pt will participate in ongoing assessment of swallow function to determine safest, least restrictive means of nutrition/hydration  2. Educate Pt and family on aspiration precautions and SLP POC  3. Pt will follow simple commands with 60% accuracy or more, MAX A  4. Pt will answer personal yes/no questions 60% accuracy or higher, MAX A  5. Pt will participate in ongoing assessment of cognitive-linguistic skills to determine ongoing POC needs  6. Educate Pt and family on safety awareness and compensatory strategies for communication     Outcome: Progressing     SLP Speech, language and cognitive assessment initiated. Pt presents with Aphasia and Cognitive Impairment.  Findings reviewed with RN and MD team. ST to continue to follow.     5/30/2024

## 2024-05-31 PROBLEM — Z75.8 DISCHARGE PLANNING ISSUES: Status: ACTIVE | Noted: 2024-05-31

## 2024-05-31 LAB
ANION GAP SERPL CALC-SCNC: 15 MMOL/L (ref 8–16)
BASOPHILS # BLD AUTO: 0.06 K/UL (ref 0–0.2)
BASOPHILS NFR BLD: 1.2 % (ref 0–1.9)
BUN SERPL-MCNC: 56 MG/DL (ref 6–20)
CALCIUM SERPL-MCNC: 9.2 MG/DL (ref 8.7–10.5)
CHLORIDE SERPL-SCNC: 100 MMOL/L (ref 95–110)
CO2 SERPL-SCNC: 20 MMOL/L (ref 23–29)
CREAT SERPL-MCNC: 5.6 MG/DL (ref 0.5–1.4)
DIFFERENTIAL METHOD BLD: ABNORMAL
EOSINOPHIL # BLD AUTO: 0.1 K/UL (ref 0–0.5)
EOSINOPHIL NFR BLD: 1.5 % (ref 0–8)
ERYTHROCYTE [DISTWIDTH] IN BLOOD BY AUTOMATED COUNT: 18.7 % (ref 11.5–14.5)
EST. GFR  (NO RACE VARIABLE): 8.3 ML/MIN/1.73 M^2
GLUCOSE SERPL-MCNC: 197 MG/DL (ref 70–110)
HCT VFR BLD AUTO: 34.8 % (ref 37–48.5)
HGB BLD-MCNC: 11.1 G/DL (ref 12–16)
IMM GRANULOCYTES # BLD AUTO: 0.01 K/UL (ref 0–0.04)
IMM GRANULOCYTES NFR BLD AUTO: 0.2 % (ref 0–0.5)
LYMPHOCYTES # BLD AUTO: 0.7 K/UL (ref 1–4.8)
LYMPHOCYTES NFR BLD: 14.8 % (ref 18–48)
MAGNESIUM SERPL-MCNC: 2.2 MG/DL (ref 1.6–2.6)
MCH RBC QN AUTO: 31.7 PG (ref 27–31)
MCHC RBC AUTO-ENTMCNC: 31.9 G/DL (ref 32–36)
MCV RBC AUTO: 99 FL (ref 82–98)
MONOCYTES # BLD AUTO: 0.9 K/UL (ref 0.3–1)
MONOCYTES NFR BLD: 17.7 % (ref 4–15)
NEUTROPHILS # BLD AUTO: 3.1 K/UL (ref 1.8–7.7)
NEUTROPHILS NFR BLD: 64.6 % (ref 38–73)
NRBC BLD-RTO: 0 /100 WBC
PHOSPHATE SERPL-MCNC: 5.5 MG/DL (ref 2.7–4.5)
PLATELET # BLD AUTO: 169 K/UL (ref 150–450)
PMV BLD AUTO: 12.4 FL (ref 9.2–12.9)
POCT GLUCOSE: 109 MG/DL (ref 70–110)
POCT GLUCOSE: 109 MG/DL (ref 70–110)
POCT GLUCOSE: 181 MG/DL (ref 70–110)
POCT GLUCOSE: 204 MG/DL (ref 70–110)
POTASSIUM SERPL-SCNC: 4.9 MMOL/L (ref 3.5–5.1)
RBC # BLD AUTO: 3.5 M/UL (ref 4–5.4)
SODIUM SERPL-SCNC: 135 MMOL/L (ref 136–145)
WBC # BLD AUTO: 4.81 K/UL (ref 3.9–12.7)

## 2024-05-31 PROCEDURE — 63600175 PHARM REV CODE 636 W HCPCS: Mod: HCNC | Performed by: NURSE PRACTITIONER

## 2024-05-31 PROCEDURE — 80048 BASIC METABOLIC PNL TOTAL CA: CPT | Mod: HCNC

## 2024-05-31 PROCEDURE — G0378 HOSPITAL OBSERVATION PER HR: HCPCS | Mod: HCNC

## 2024-05-31 PROCEDURE — 36415 COLL VENOUS BLD VENIPUNCTURE: CPT | Mod: HCNC

## 2024-05-31 PROCEDURE — 84100 ASSAY OF PHOSPHORUS: CPT | Mod: HCNC

## 2024-05-31 PROCEDURE — 85025 COMPLETE CBC W/AUTO DIFF WBC: CPT | Mod: HCNC

## 2024-05-31 PROCEDURE — 83735 ASSAY OF MAGNESIUM: CPT | Mod: HCNC

## 2024-05-31 PROCEDURE — 99232 SBSQ HOSP IP/OBS MODERATE 35: CPT | Mod: HCNC,,, | Performed by: NURSE PRACTITIONER

## 2024-05-31 PROCEDURE — 25000003 PHARM REV CODE 250: Mod: HCNC

## 2024-05-31 PROCEDURE — 63600175 PHARM REV CODE 636 W HCPCS: Mod: HCNC

## 2024-05-31 RX ORDER — HYDROPHILIC CREAM
2 PASTE (GRAM) TOPICAL DAILY
Qty: 170 G | Refills: 0 | Status: SHIPPED | OUTPATIENT
Start: 2024-05-31

## 2024-05-31 RX ADMIN — APIXABAN 5 MG: 5 TABLET, FILM COATED ORAL at 10:05

## 2024-05-31 RX ADMIN — HYDRALAZINE HYDROCHLORIDE 50 MG: 50 TABLET ORAL at 10:05

## 2024-05-31 RX ADMIN — HEPARIN SODIUM 1000 UNITS: 1000 INJECTION, SOLUTION INTRAVENOUS; SUBCUTANEOUS at 05:05

## 2024-05-31 RX ADMIN — CARVEDILOL 6.25 MG: 6.25 TABLET, FILM COATED ORAL at 10:05

## 2024-05-31 RX ADMIN — Medication 6 MG: at 10:05

## 2024-05-31 NOTE — PLAN OF CARE
Round on pt from [address]. Pt sts pain to leg is slightly better. SW who was accompanied by PA attempted to contact pt's spouse and daughter x3 times RE: acknowledgement of PA sharing the placement request by the pt's spouse. Both pt and daughter phones have been disconnected and unable to leave VM. SW will continue to follow up. Info has been escalated to leadership.     12:40 PM SW attempted to contact pt's spouse and daughter again and phones are still disconnected.     1:10 PM SW left  for Adult Protective Services to return call.     1:43 PM SW spoke to Mr. Almanzar with APS to make report. Per Jenelle, he will submit and someone will follow up.     Valery Grewal, MSW  [facility]  Ext. [phone]

## 2024-05-31 NOTE — PLAN OF CARE
[facility] - [facility]      HOME HEALTH ORDERS  FACE TO FACE ENCOUNTER    Patient Name: Suyapa Connelly  YOB: 1965    PCP: Magen Christensen MD   PCP Address: [address] [address]  PCP Phone Number: [phone]  PCP Fax: [phone]    Encounter Date: 5/28/24    Admit to Home Health    Diagnoses:  Active Hospital Problems    Diagnosis  POA    *Acute encephalopathy [G93.40]  Yes    Severe obesity (BMI >= 40) [E66.01]  Yes     BMI 45      ESRD (end stage renal disease) [N18.6]  Yes    S/P AKA (above knee amputation) bilateral [Z89.611, Z89.612]  Not Applicable    Chronic combined systolic and diastolic heart failure [I50.42]  Yes    Paroxysmal atrial fibrillation [I48.0]  Yes    Anemia due to chronic kidney disease, on chronic dialysis [N18.6, D63.1, Z99.2]  Not Applicable    CAD (coronary artery disease) [I25.10]  Yes     Formatting of this note might be different from the original.  Last Assessment & Plan:   Formatting of this note might be different from the original.  -- Optimize glucose control.      Type 2 diabetes mellitus with hyperglycemia, with long-term current use of insulin [E11.65, Z79.4]  Not Applicable    CAROLIN (generalized anxiety disorder) [F41.1]  Yes      Resolved Hospital Problems    Diagnosis Date Resolved POA    Acute focal neurological deficit [R29.818] 05/30/2024 Unknown       Follow Up Appointments:  Future Appointments   Date Time Provider Department Center   6/7/2024  2:30 PM Geeta Cohen, APRN,ANP-C [facility] ENDOCRN [facility]       Allergies:  Review of patient's allergies indicates:   Allergen Reactions    Ciprofloxacin Itching    Iodine      Kidney injury    Pcn [penicillins]      Rash; tolerated ceftriaxone on 1/13/20       Medications: Review discharge medications with patient and family and provide education.    Current Facility-Administered Medications   Medication Dose Route Frequency Provider Last Rate Last Admin    0.9%  NaCl  infusion   Intravenous Once Katie Monique DNP        albuterol-ipratropium 2.5 mg-0.5 mg/3 mL nebulizer solution 3 mL  3 mL Nebulization Q4H PRN Leticia Ley PA-C        allopurinol split tablet 50 mg  50 mg Oral Every other day Leticia Ley PA-C   50 mg at 05/30/24 0900    aluminum-magnesium hydroxide-simethicone 200-200-20 mg/5 mL suspension 30 mL  30 mL Oral QID PRN Leticia Ley PA-C        apixaban tablet 5 mg  5 mg Oral BID Leticia Ley PA-C   5 mg at 05/30/24 2044    atorvastatin tablet 80 mg  80 mg Oral Daily Leticia Ley PA-C   80 mg at 05/30/24 0902    bisacodyL suppository 10 mg  10 mg Rectal Daily PRN Leticia Ley PA-C        calcitRIOL capsule 0.5 mcg  0.5 mcg Oral Daily Leticia Ley PA-C   0.5 mcg at 05/30/24 0902    calcium acetate(phosphat bind) capsule 667 mg  667 mg Oral TID WM Leticia Ley PA-C   667 mg at 05/30/24 1712    carvediloL tablet 6.25 mg  6.25 mg Oral BID Leticia Ley PA-C   6.25 mg at 05/30/24 2044    cefTRIAXone (Rocephin) 1 g in dextrose 5 % in water (D5W) 100 mL IVPB (MB+)  1 g Intravenous Q24H Josie Scherer PA-C   Stopped at 05/30/24 0937    clopidogreL tablet 75 mg  75 mg Oral Daily Leticia Ley PA-C   75 mg at 05/30/24 0902    dextrose 10% bolus 125 mL 125 mL  12.5 g Intravenous PRN Leticia Ley PA-C        dextrose 10% bolus 250 mL 250 mL  25 g Intravenous PRN Leticia Ley PA-C        famotidine tablet 20 mg  20 mg Oral Daily Leticia Ley PA-C   20 mg at 05/30/24 0902    glucagon (human recombinant) injection 1 mg  1 mg Intramuscular PRN Leticia Ley PA-C        glucose chewable tablet 16 g  16 g Oral LILIAN Leticia Ley PA-C        glucose chewable tablet 24 g  24 g Oral PRN Leticia Ley PA-C        heparin (porcine) injection 1,000 Units  1,000 Units Intra-Catheter PRN Katie Monique DNP   1,000 Units at 05/29/24 1813    hydrALAZINE injection 5 mg  5 mg  Intravenous Q6H PRN Leticia Ley PA-C   5 mg at 05/29/24 0431    hydrALAZINE tablet 50 mg  50 mg Oral TID Leticia Ley PA-C   50 mg at 05/30/24 2044    insulin aspart U-100 pen 0-5 Units  0-5 Units Subcutaneous QID (AC + HS) PRN Leticia Ley PA-C   1 Units at 05/30/24 2202    melatonin tablet 6 mg  6 mg Oral Nightly PRN Leticia Ley PA-C        naloxone 0.4 mg/mL injection 0.02 mg  0.02 mg Intravenous PRN Leticia Ley PA-C        ondansetron disintegrating tablet 8 mg  8 mg Oral Q8H PRN Leticia Ley PA-C        polyethylene glycol packet 17 g  17 g Oral BID PRN Leticia Ley PA-C        prochlorperazine injection Soln 5 mg  5 mg Intravenous Q6H PRN Leticia Ley PA-C        sertraline tablet 100 mg  100 mg Oral Daily Leticia Ley PA-C   100 mg at 05/30/24 0903    simethicone chewable tablet 80 mg  1 tablet Oral QID PRN Leticia Ley PA-C        sodium chloride 0.9% flush 5 mL  5 mL Intravenous PRN Leticia Ley PA-C         Current Discharge Medication List        CONTINUE these medications which have NOT CHANGED    Details   acetaminophen (TYLENOL) 500 MG tablet Take 500 mg by mouth every 6 (six) hours as needed for Pain.      allopurinoL (ZYLOPRIM) 100 MG tablet Take 0.5 tablets (50 mg total) by mouth every other day.  Qty: 8 tablet, Refills: 11      apixaban (ELIQUIS) 5 mg Tab Take 1 tablet (5 mg total) by mouth 2 (two) times daily.  Qty: 60 tablet, Refills: 11      atorvastatin (LIPITOR) 80 MG tablet Take 1 tablet (80 mg total) by mouth once daily.  Qty: 90 tablet, Refills: 3    Associated Diagnoses: Coronary artery disease, unspecified vessel or lesion type, unspecified whether angina present, unspecified whether native or transplanted heart      blood sugar diagnostic Strp Use to check blood glucose 2 (two) times a day.  Qty: 100 strip, Refills: 0      calcitRIOL (ROCALTROL) 0.5 MCG Cap Take 1 capsule (0.5 mcg total) by mouth once  "daily.  Qty: 90 capsule, Refills: 3      calcium acetate,phosphat bind, (PHOSLO) 667 mg capsule Take 1 capsule (667 mg total) by mouth 3 (three) times daily with meals.  Qty: 90 capsule, Refills: 11      carvediloL (COREG) 6.25 MG tablet Take 1 tablet (6.25 mg total) by mouth 2 (two) times daily.  Qty: 180 tablet, Refills: 3    Comments: .      clopidogreL (PLAVIX) 75 mg tablet Take 1 tablet (75 mg total) by mouth once daily.  Qty: 90 tablet, Refills: 3    Associated Diagnoses: S/P CABG x 1      famotidine (PEPCID) 20 MG tablet Take 1 tablet (20 mg total) by mouth 2 (two) times daily.  Qty: 180 tablet, Refills: 3      hydrALAZINE (APRESOLINE) 50 MG tablet Take 1 tablet (50 mg total) by mouth 3 (three) times daily.  Qty: 270 tablet, Refills: 3    Comments: .      insulin aspart, niacinamide, (FIASP FLEXTOUCH U-100 INSULIN) 100 unit/mL (3 mL) InPn Inject 3 Units into the skin 3 (three) times daily with meals.      lancets Misc To check BG 3 times daily, to use with insurance preferred meter  Qty: 100 each, Refills: 3      multivitamin (ONE DAILY MULTIVITAMIN) per tablet Take 1 tablet by mouth once daily.      pen needle, diabetic 32 gauge x 5/32" Ndle 1 Units by Misc.(Non-Drug; Combo Route) route before meals as needed (SSI).  Qty: 50 each, Refills: 3      sertraline (ZOLOFT) 100 MG tablet Take 1 tablet (100 mg total) by mouth once daily.  Qty: 90 tablet, Refills: 3    Associated Diagnoses: CARLOIN (generalized anxiety disorder)      sodium bicarbonate 650 MG tablet TAKE 1 TABLET(650 MG) BY MOUTH THREE TIMES DAILY  Qty: 90 tablet, Refills: 0      traZODone (DESYREL) 50 MG tablet Take 0.5 tablets (25 mg total) by mouth every evening.  Qty: 15 tablet, Refills: 11           STOP taking these medications       sevelamer carbonate (RENVELA) 800 mg Tab Comments:   Reason for Stopping:                 I have seen and examined this patient within the last 30 days. My clinical findings that support the need for the home health " skilled services and home bound status are the following:no   Weakness/numbness causing balance and gait disturbance due to Weakness/Debility making it taxing to leave home.     Diet:   renal diet  General Recommendations:  Dysphagia therapy, Speech/language therapy, and Cognitive-linguistic therapy  Diet recommendations:  Minced & Moist Diet - IDDSI Level 5, Thin liquids - IDDSI Level 0 Please avoid mixed consistencies (such as cereals, soups with large pieces of meat/vegetable, etc.), avoid dry/coarse/crumbly items such as rice ,nuts, seeds, dried fruit, coconut, avoid fibrous foods, avoid tough skins, avoid tough vegetables (i.e. no corn, brussel sprouts,etc) avoid chewy/sticky foods (i.e. no peanut putter, caramel, licorice, etc.)    Aspiration Precautions: Strict 1:1 supervision with all PO required for safety, only when vital signs stable, only when awake/alert/attentive, only when accepting of PO, avoid straws, single bites/sips, 1 bite/sip at a time, Eliminate distractions, Frequent oral care, HOB to 90 degrees, Meds crushed in puree, Remain upright 30 minutes post meal, and Strict aspiration precautions. Continue to monitor for signs and symptoms of aspiration and discontinue oral feeding should you notice any of the following: watery eyes, reddened facial area, wet vocal quality, increased work of breathing, change in respiratory status, increased congestion, coughing, fever and/or change in level of alertness     Labs:  Per agency    Referrals/ Consults  Physical Therapy to evaluate and treat. Evaluate for home safety and equipment needs; Establish/upgrade home exercise program. Perform / instruct on therapeutic exercises, gait training, transfer training, and Range of Motion.  Occupational Therapy to evaluate and treat. Evaluate home environment for safety and equipment needs. Perform/Instruct on transfers, ADL training, ROM, and therapeutic exercises.  Speech Therapy  to evaluate and treat for  Language  and Cognition.   to evaluate for community resources/long-range planning.  Aide to provide assistance with personal care, ADLs, and vital signs.    Activities:   activity as tolerated    Nursing:   Agency to admit patient within 24 hours of hospital discharge unless specified on physician order or at patient request    SN to complete comprehensive assessment including routine vital signs. Instruct on disease process and s/s of complications to report to MD. Review/verify medication list sent home with the patient at time of discharge  and instruct patient/caregiver as needed. Frequency may be adjusted depending on start of care date.     Skilled nurse to perform up to 3 visits PRN for symptoms related to diagnosis    Notify MD if SBP > 160 or < 90; DBP > 90 or < 50; HR > 120 or < 50; Temp > 101; O2 < 88%;     Ok to schedule additional visits based on staff availability and patient request on consecutive days within the home health episode.    When multiple disciplines ordered:    Start of Care occurs on Sunday - Wednesday schedule remaining discipline evaluations as ordered on separate consecutive days following the start of care.    Thursday SOC -schedule subsequent evaluations Friday and Monday the following week.     Friday - Saturday SOC - schedule subsequent discipline evaluations on consecutive days starting Monday of the following week.    For all post-discharge communication and subsequent orders please contact patient's primary care physician. If unable to reach primary care physician or do not receive response within 30 minutes, please contact PCP for clinical staff order clarification    Miscellaneous   Routine Skin for Bedridden Patients: Instruct patient/caregiver to apply moisture barrier cream to all skin folds and wet areas in perineal area daily and after baths and all bowel movements.  Diabetic Care:   SN to perform and educate Diabetic management with blood glucose monitoring:,  Fingerstick blood sugar AC and HS, and Report CBG < 60 or > 350 to physician.    Home Health Aide:  Nursing Three times weekly, Physical Therapy Three times weekly, Occupational Therapy Three times weekly, Speech Language Pathology Three times weekly, Medical Social Work Three times weekly, and Home Health Aide Three times weekly    Wound Care Orders    Pt w/ unstagable wound to L upper buttock, wound bed primarily covered w/ eschar.  Suspect wounds are r/t pressure as pt is wheelchair bound. Will order Vashe for cleansing given antimicrobial properties and Triad to support autolytic debridement as well as barrier protection. Immerse surface ordered.     RECOMMENDATIONS:  BID/PRN - sacrum, L upper buttocks - cleanse gently w/ Vashe, pat dry and apply Triad to affected areas, leave MACKENZIE.     Place pt on immerse surface.        I certify that this patient is confined to her home and needs intermittent skilled nursing care, physical therapy, speech therapy, and occupational therapy.      Aleta Rod PA-C  [facility]   [facility] [address]

## 2024-05-31 NOTE — SUBJECTIVE & OBJECTIVE
Interval History:  NAEON. AFVSS.  Patient evaluated at bedside, again non-verbal. Becomes tearful when asked to do commands. Makes eye contact when called by name.  Attempted to call family with CM today to discuss discharge planning. All phone numbers not answered or disconnected. APS called and report made.     Review of Systems   Unable to perform ROS: Dementia     Objective:     Vital Signs (Most Recent):  Temp: 98.2 °F (36.8 °C) (05/31/24 1217)  Pulse: 80 (05/31/24 1400)  Resp: 18 (05/31/24 1217)  BP: (!) 150/75 (05/31/24 1400)  SpO2: (!) 91 % (05/31/24 1217) Vital Signs (24h Range):  Temp:  [97.7 °F (36.5 °C)-98.5 °F (36.9 °C)] 98.2 °F (36.8 °C)  Pulse:  [80-89] 80  Resp:  [17-20] 18  SpO2:  [91 %-97 %] 91 %  BP: (120-160)/(65-80) 150/75     Weight: 70.1 kg (154 lb 8.7 oz)  Body mass index is 47.16 kg/m².  No intake or output data in the 24 hours ending 05/31/24 1417      Physical Exam  Vitals and nursing note reviewed.   Constitutional:       General: She is not in acute distress.     Appearance: She is well-developed. She is obese.   HENT:      Head: Normocephalic and atraumatic.   Cardiovascular:      Rate and Rhythm: Normal rate and regular rhythm.      Heart sounds: Normal heart sounds.   Pulmonary:      Effort: Pulmonary effort is normal. No respiratory distress.   Abdominal:      General: There is no distension.   Musculoskeletal:         General: No tenderness. Normal range of motion.      Comments: Bilateral AKA    Skin:     General: Skin is warm and dry.      Findings: No rash.   Neurological:      Mental Status: She is alert and oriented to person, place, and time.      Comments: Unable to assess orientation, non-verbal. Makes eye contact when name called. Tearful on exam   Psychiatric:         Behavior: Behavior normal.         Thought Content: Thought content normal.         Judgment: Judgment normal.     Significant Labs: All pertinent labs within the past 24 hours have been  reviewed.    Significant Imaging: I have reviewed all pertinent imaging results/findings within the past 24 hours.

## 2024-05-31 NOTE — SUBJECTIVE & OBJECTIVE
Interval History: NAEON. HD today.     Review of patient's allergies indicates:   Allergen Reactions    Ciprofloxacin Itching    Iodine      Kidney injury    Pcn [penicillins]      Rash; tolerated ceftriaxone on 1/13/20     Current Facility-Administered Medications   Medication Frequency    0.9%  NaCl infusion Once    albuterol-ipratropium 2.5 mg-0.5 mg/3 mL nebulizer solution 3 mL Q4H PRN    allopurinol split tablet 50 mg Every other day    aluminum-magnesium hydroxide-simethicone 200-200-20 mg/5 mL suspension 30 mL QID PRN    apixaban tablet 5 mg BID    atorvastatin tablet 80 mg Daily    bisacodyL suppository 10 mg Daily PRN    calcitRIOL capsule 0.5 mcg Daily    calcium acetate(phosphat bind) capsule 667 mg TID WM    carvediloL tablet 6.25 mg BID    cefTRIAXone (Rocephin) 1 g in dextrose 5 % in water (D5W) 100 mL IVPB (MB+) Q24H    clopidogreL tablet 75 mg Daily    dextrose 10% bolus 125 mL 125 mL PRN    dextrose 10% bolus 250 mL 250 mL PRN    famotidine tablet 20 mg Daily    glucagon (human recombinant) injection 1 mg PRN    glucose chewable tablet 16 g PRN    glucose chewable tablet 24 g PRN    heparin (porcine) injection 1,000 Units PRN    hydrALAZINE injection 5 mg Q6H PRN    hydrALAZINE tablet 50 mg TID    insulin aspart U-100 pen 0-5 Units QID (AC + HS) PRN    melatonin tablet 6 mg Nightly PRN    naloxone 0.4 mg/mL injection 0.02 mg PRN    ondansetron disintegrating tablet 8 mg Q8H PRN    polyethylene glycol packet 17 g BID PRN    prochlorperazine injection Soln 5 mg Q6H PRN    sertraline tablet 100 mg Daily    simethicone chewable tablet 80 mg QID PRN    sodium chloride 0.9% flush 5 mL PRN       Objective:     Vital Signs (Most Recent):  Temp: 98.5 °F (36.9 °C) (05/31/24 0814)  Pulse: 82 (05/31/24 1049)  Resp: 18 (05/31/24 0814)  BP: (!) 140/72 (05/31/24 0814)  SpO2: 95 % (05/31/24 0814) Vital Signs (24h Range):  Temp:  [97.7 °F (36.5 °C)-98.5 °F (36.9 °C)] 98.5 °F (36.9  °C)  Pulse:  [82-89] 82  Resp:  [17-20] 18  SpO2:  [94 %-97 %] 95 %  BP: (120-140)/(59-80) 140/72     Weight: 70.1 kg (154 lb 8.7 oz) (05/29/24 0350)  Body mass index is 47.16 kg/m².  Body surface area is 1.54 meters squared.    No intake/output data recorded.     Physical Exam  Vitals and nursing note reviewed.   Constitutional:       General: She is not in acute distress.     Appearance: She is ill-appearing.      Comments: Alert but minimally conversant, dysarthric    HENT:      Head: Normocephalic and atraumatic.      Nose: Nose normal.      Mouth/Throat:      Pharynx: No oropharyngeal exudate.   Eyes:      Extraocular Movements: Extraocular movements intact.   Cardiovascular:      Rate and Rhythm: Normal rate and regular rhythm.      Heart sounds: Normal heart sounds.   Pulmonary:      Effort: Pulmonary effort is normal. No respiratory distress.      Breath sounds: Normal breath sounds.   Abdominal:      General: Bowel sounds are normal. There is no distension.      Palpations: Abdomen is soft.      Tenderness: There is no abdominal tenderness.   Musculoskeletal:         General: Deformity present.      Cervical back: Normal range of motion and neck supple.      Comments: Bilateral AKA  No pitting edema   Skin:     General: Skin is warm and dry.   Neurological:      General: No focal deficit present.      Mental Status: She is alert.      Motor: Weakness present.      Comments: Oriented to self and can identify  by name. Disoriented to place, situation, time  Dysarthric and aphasic   Follows some commands, requires repeated prompting     Psychiatric:         Mood and Affect: Affect is tearful.        Significant Labs:  CBC:   Recent Labs   Lab 05/31/24  0332   WBC 4.81   RBC 3.50*   HGB 11.1*   HCT 34.8*      MCV 99*   MCH 31.7*   MCHC 31.9*     CMP:   Recent Labs   Lab 05/30/24  0544 05/31/24  0901   * 197*   CALCIUM 8.8 9.2   ALBUMIN 2.8*  --    PROT 7.9  --    * 135*   K 4.0 4.9    CO2 22* 20*   CL 99 100   BUN 44* 56*   CREATININE 4.2* 5.6*   ALKPHOS 177*  --    ALT 13  --    AST 16  --    BILITOT 1.1*  --      All labs within the past 24 hours have been reviewed.

## 2024-05-31 NOTE — ASSESSMENT & PLAN NOTE
- work up for acute encephalopathy completed, no acute or reversible processes  - suspect this is chronic underlying undiagnosed dementia or microvascular disease   - difficulty getting in touch with family to discuss discharge options   - APS report made per CM  - continue to work with SW and CM for safe discharge planning

## 2024-05-31 NOTE — NURSING
Nurses Note -- 4 Eyes      5/31/2024   10:50 AM      Skin assessed during: Daily Assessment      [] No Altered Skin Integrity Present    []Prevention Measures Documented      [x] Yes- Altered Skin Integrity Present or Discovered   [] LDA Added if Not in Epic (Describe Wound)   [] New Altered Skin Integrity was Present on Admit and Documented in LDA   [] Wound Image Taken    Wound Care Consulted? No    Attending Nurse:  Ruby Lin RN/Staff Member:   Jo Ann

## 2024-05-31 NOTE — PROGRESS NOTES
05/31/24 1700   Post-Hemodialysis Assessment   Blood Volume Processed (Liters) 71.4 L   Dialyzer Clearance Lightly streaked   Duration of Treatment 210 minutes   Additional Fluid Intake (mL) 500 mL   Total UF (mL) 2500 mL   Net Fluid Removal 2000     HD tx completed without issue, blood returned and PC heparin locked without issue. Pt in NAD/VSS waiting on transport back to unit. Report called to primary RN.    8930 Back to unit via stretcher with transport

## 2024-05-31 NOTE — PROGRESS NOTES
"[facility] [address] [place] [address] Medicine  Progress Note    Patient Name: Suyapa Connelly  MRN: [id number]  Patient Class: OP- Observation   Admission Date: 5/28/2024  Length of Stay: 0 days  Attending Physician: Yury Daily MD  Primary Care Provider: Magen Christensen MD    Subjective:     Principal Problem:Discharge planning issues    HPI:  Suyapa Connelly is a 58 y.o. female with PMHx of b/l AKAs, ESRD on HD, CAD s/p CABG, afib on eliquis, HTN here with AMS.  at bedside assists with history. He reports patient was in her normal state of health until yesterday afternoon when they were driving and stopped at a gas station. She suddenly became rigid with her arms extending outwards and her head back with her eyes rolling backwards then suddenly relaxed and had a large bowel movement. Since this episode she has been confused and altered with abnormal speech and generalized weakness.  has never witnessed a similar episode. She skipped HD Monday, last full session Friday. History from patient limited. She follows some commands and is tearful ad reports she is afraid of being in the hospital, does not answer questions.    ED: HR in 90s and briefly hypertensive but otherwise HDS and afebrile. K 5.2. bicarb 20. Glucose 264. BUN 74. Cr 5.8. Albumin 1.1. T bili 1.1. . Hbg 11.3. She was a stroke code in the ED with NIHSS 5 and had CTA without acute intracranial process. Vascular neurology felt symptoms were stroke mimic/post ictal and recommended EEG/admission. She continue to be encephalopathic in ED and was admitted to .     Overview/Hospital Course:  Suyapa Connelly was placed in  observation for workup of encephalopathy. Patient continues to be confused and minimally engaged. She is able to follow commands with prompting. UA infectious, reflex culture pending. Start empiric rocephin - cultures did not isolate an organism in predominance. EEG obtained: "No epileptiform discharges, " "periodic discharges, lateralized rhythmic delta activity or electrographic seizures were seen. IMPRESSION: "Abnormal EEG due to a mild to moderate generalized cerebral dysfunction with no electrographic seizures or indications of seizure tendency."  MRI brain without acute changes, revealed "Overall degree of parenchymal volume loss has notably progressed when compared to the 05/06/2018 brain MRI. Neuropsychiatric consultation could be considered, as warranted clinically."    Medically ready to be discharged, discussed with  via phone call who initially wished to pursue NH placement. Discharge complicated as patient's family stopped answering the phone or all available numbers were out of service. APS report made per CM.     Interval History:  NAEON. AFVSS.  Patient evaluated at bedside, again non-verbal. Becomes tearful when asked to do commands. Makes eye contact when called by name.  Attempted to call family with CM today to discuss discharge planning. All phone numbers not answered or disconnected. APS called and report made.     Review of Systems   Unable to perform ROS: Dementia     Objective:     Vital Signs (Most Recent):  Temp: 98.2 °F (36.8 °C) (05/31/24 1217)  Pulse: 80 (05/31/24 1400)  Resp: 18 (05/31/24 1217)  BP: (!) 150/75 (05/31/24 1400)  SpO2: (!) 91 % (05/31/24 1217) Vital Signs (24h Range):  Temp:  [97.7 °F (36.5 °C)-98.5 °F (36.9 °C)] 98.2 °F (36.8 °C)  Pulse:  [80-89] 80  Resp:  [17-20] 18  SpO2:  [91 %-97 %] 91 %  BP: (120-160)/(65-80) 150/75     Weight: 70.1 kg (154 lb 8.7 oz)  Body mass index is 47.16 kg/m².  No intake or output data in the 24 hours ending 05/31/24 1417      Physical Exam  Vitals and nursing note reviewed.   Constitutional:       General: She is not in acute distress.     Appearance: She is well-developed. She is obese.   HENT:      Head: Normocephalic and atraumatic.   Cardiovascular:      Rate and Rhythm: Normal rate and regular rhythm.      Heart sounds: Normal " heart sounds.   Pulmonary:      Effort: Pulmonary effort is normal. No respiratory distress.   Abdominal:      General: There is no distension.   Musculoskeletal:         General: No tenderness. Normal range of motion.      Comments: Bilateral AKA    Skin:     General: Skin is warm and dry.      Findings: No rash.   Neurological:      Mental Status: She is alert and oriented to person, place, and time.      Comments: Unable to assess orientation, non-verbal. Makes eye contact when name called. Tearful on exam   Psychiatric:         Behavior: Behavior normal.         Thought Content: Thought content normal.         Judgment: Judgment normal.     Significant Labs: All pertinent labs within the past 24 hours have been reviewed.    Significant Imaging: I have reviewed all pertinent imaging results/findings within the past 24 hours.    Assessment/Plan:      * Discharge planning issues  - work up for acute encephalopathy completed, no acute or reversible processes  - suspect this is chronic underlying undiagnosed dementia or microvascular disease   - difficulty getting in touch with family to discuss discharge options   - APS report made per CM  - continue to work with SW and CM for safe discharge planning     Acute encephalopathy  58F presenting after an episode of full body rigidity, bowel incontinence, and now persistent confusion. Se was a stroke code on arrival to ED, CTA with no acute LVO, vascular neurology recommending admission/EEG. No seizure history.    - Afebrile and HDS on admission  - Elevated BUN on admission, nephrology consult for HD  - EEG ordered: No epileptiform discharges, periodic discharges, lateralized rhythmic delta activity or electrographic seizures were seen.  IMPRESSION: Abnormal EEG due to a mild to moderate generalized cerebral dysfunction with no electrographic seizures or indications of seizure tendency.  - Seizure, aspiration, fall precautions  - Neuro checks q4h  - SLP consult   Minced &  Moist Diet - IDDSI Level 5, Thin liquids - IDDSI Level 0   - UDS negative  - Ammonia wnl   - UA infectious, but culture with no organisms in predominance   - treated with rocephin x3 days  - MRI brain ordered, no acute process    Severe obesity (BMI >= 40)  Body mass index is 46.38 kg/m². Morbid obesity complicates all aspects of disease management from diagnostic modalities to treatment.    ESRD (end stage renal disease)  - HD MWF  - Nephrology consulted for HD    S/P AKA (above knee amputation) bilateral  - Noted. Fall precautions.    Chronic combined systolic and diastolic heart failure  Patient is identified as having Combined Systolic and Diastolic heart failure that is Chronic. Patient is off CHF pathway.CHF is currently controlled. Continue Beta Blocker and Nitrate/Vasodilator. Volume management with HD.  - Place on fluid restriction of 1.5 L. Continue to stress to patient importance of self efficacy and  on diet for CHF.   - Monitor clinical status closely. Monitor on telemetry.   - Monitor strict Is&Os and daily weights.    Results for orders placed during the hospital encounter of 05/07/24    Echo    Interpretation Summary    Left Ventricle: The left ventricle is mildly dilated. Ventricular mass is normal. Normal wall thickness. Severe global hypokinesis present. There is severely reduced systolic function. Ejection fraction by visual approximation is 15%. There is diastolic dysfunction.    Right Ventricle: Severe right ventricular enlargement. Wall thickness is normal. Right ventricle wall motion has global hypokinesis. Systolic function is mildly reduced.    Left Atrium: Left atrium is severely dilated. There is an atrial septal aneurysm.    Right Atrium: Right atrium is dilated.    Aortic Valve: There is mild aortic regurgitation.    Mitral Valve: There is mild regurgitation.    Tricuspid Valve: There is annular dilation present. There is normal leaflet mobility. There is severe functional  regurgitation.    Pulmonary Artery: The estimated pulmonary artery systolic pressure is at least 24 mmHg. PASP is likely under-estimated in the setting of severe tricuspid regurgitation.    IVC/SVC: Central venous pressure of at least 8 mmHg.    No vegetation seen.       Paroxysmal atrial fibrillation  Patient with Paroxysmal (<7 days) atrial fibrillation which is controlled currently with Beta Blocker. Patient is currently in sinus rhythm.[id number] Score: 3. Anticoagulation indicated. Anticoagulation done with eliquis .    Anemia due to chronic kidney disease, on chronic dialysis  Patient's anemia is currently controlled. Has not received any PRBCs to date. Etiology likely d/t chronic disease due to ESRD  Current CBC reviewed-   Lab Results   Component Value Date    HGB 11.3 (L) 05/28/2024    HCT 33.6 (L) 05/28/2024     Monitor serial CBC and transfuse if patient becomes hemodynamically unstable, symptomatic or H/H drops below 7/21.    CAD (coronary artery disease)  Patient with known CAD s/p CABG, which is controlled Will continue Plavix and Statin and monitor for S/Sx of angina/ACS. Continue to monitor on telemetry.     Type 2 diabetes mellitus with hyperglycemia, with long-term current use of insulin  Patient's FSGs are uncontrolled due to hyperglycemia on current medication regimen.  Last A1c reviewed-   Lab Results   Component Value Date    HGBA1C 8.0 (H) 04/24/2024     Most recent fingerstick glucose reviewed-   Recent Labs   Lab 05/28/24  2142   POCTGLUCOSE 284*     Current correctional scale  Low  Maintain anti-hyperglycemic dose as follows-   Antihyperglycemics (From admission, onward)      Start     Stop Route Frequency Ordered    05/29/24 0101  insulin aspart U-100 pen 0-5 Units         -- SubQ Before meals & nightly PRN 05/29/24 0001        Hold Oral hypoglycemics while patient is in the hospital.   DM diet when passes quesada    CAROLIN (generalized anxiety disorder)  - Continue home sertraline       VTE  Risk Mitigation (From admission, onward)           Ordered     heparin (porcine) injection 1,000 Units  As needed (PRN)         05/29/24 1601     apixaban tablet 5 mg  2 times daily         05/29/24 0036     Reason for No Pharmacological VTE Prophylaxis  Once        Question:  Reasons:  Answer:  Already adequately anticoagulated on oral Anticoagulants    05/29/24 0001     IP VTE HIGH RISK PATIENT  Once         05/29/24 0001     Reason for no Mechanical VTE Prophylaxis  Once        Comments: Bilateral AKA, on eliquis   Question:  Reasons:  Answer:  Physician Provided (leave comment)    05/29/24 0001                    Discharge Planning   DEVAN: 5/31/2024     Code Status: Full Code   Is the patient medically ready for discharge?:     Reason for patient still in hospital (select all that apply): Pending disposition  Discharge Plan A: Home with family          Aleta Rod PA-C  Department of Hospital Medicine   [facility]

## 2024-06-01 LAB
ANION GAP SERPL CALC-SCNC: 15 MMOL/L (ref 8–16)
BASOPHILS # BLD AUTO: 0.06 K/UL (ref 0–0.2)
BASOPHILS NFR BLD: 1.5 % (ref 0–1.9)
BUN SERPL-MCNC: 35 MG/DL (ref 6–20)
CALCIUM SERPL-MCNC: 8.7 MG/DL (ref 8.7–10.5)
CHLORIDE SERPL-SCNC: 98 MMOL/L (ref 95–110)
CO2 SERPL-SCNC: 20 MMOL/L (ref 23–29)
CREAT SERPL-MCNC: 4.3 MG/DL (ref 0.5–1.4)
DIFFERENTIAL METHOD BLD: ABNORMAL
EOSINOPHIL # BLD AUTO: 0.1 K/UL (ref 0–0.5)
EOSINOPHIL NFR BLD: 2.3 % (ref 0–8)
ERYTHROCYTE [DISTWIDTH] IN BLOOD BY AUTOMATED COUNT: 18.2 % (ref 11.5–14.5)
EST. GFR  (NO RACE VARIABLE): 11.4 ML/MIN/1.73 M^2
GLUCOSE SERPL-MCNC: 212 MG/DL (ref 70–110)
HCT VFR BLD AUTO: 33.7 % (ref 37–48.5)
HGB BLD-MCNC: 10.8 G/DL (ref 12–16)
IMM GRANULOCYTES # BLD AUTO: 0.01 K/UL (ref 0–0.04)
IMM GRANULOCYTES NFR BLD AUTO: 0.3 % (ref 0–0.5)
LYMPHOCYTES # BLD AUTO: 0.5 K/UL (ref 1–4.8)
LYMPHOCYTES NFR BLD: 13.8 % (ref 18–48)
MAGNESIUM SERPL-MCNC: 2.1 MG/DL (ref 1.6–2.6)
MCH RBC QN AUTO: 31.1 PG (ref 27–31)
MCHC RBC AUTO-ENTMCNC: 32 G/DL (ref 32–36)
MCV RBC AUTO: 97 FL (ref 82–98)
MONOCYTES # BLD AUTO: 0.7 K/UL (ref 0.3–1)
MONOCYTES NFR BLD: 17.6 % (ref 4–15)
NEUTROPHILS # BLD AUTO: 2.5 K/UL (ref 1.8–7.7)
NEUTROPHILS NFR BLD: 64.5 % (ref 38–73)
NRBC BLD-RTO: 0 /100 WBC
PHOSPHATE SERPL-MCNC: 5.1 MG/DL (ref 2.7–4.5)
PLATELET # BLD AUTO: 189 K/UL (ref 150–450)
PMV BLD AUTO: 11.1 FL (ref 9.2–12.9)
POCT GLUCOSE: 185 MG/DL (ref 70–110)
POCT GLUCOSE: 214 MG/DL (ref 70–110)
POCT GLUCOSE: 223 MG/DL (ref 70–110)
POCT GLUCOSE: 231 MG/DL (ref 70–110)
POTASSIUM SERPL-SCNC: 4.7 MMOL/L (ref 3.5–5.1)
RBC # BLD AUTO: 3.47 M/UL (ref 4–5.4)
SODIUM SERPL-SCNC: 133 MMOL/L (ref 136–145)
WBC # BLD AUTO: 3.92 K/UL (ref 3.9–12.7)

## 2024-06-01 PROCEDURE — 25000003 PHARM REV CODE 250: Mod: HCNC

## 2024-06-01 PROCEDURE — G0378 HOSPITAL OBSERVATION PER HR: HCPCS | Mod: HCNC

## 2024-06-01 PROCEDURE — 84100 ASSAY OF PHOSPHORUS: CPT | Mod: HCNC

## 2024-06-01 PROCEDURE — 36415 COLL VENOUS BLD VENIPUNCTURE: CPT | Mod: HCNC

## 2024-06-01 PROCEDURE — 85025 COMPLETE CBC W/AUTO DIFF WBC: CPT | Mod: HCNC

## 2024-06-01 PROCEDURE — 83735 ASSAY OF MAGNESIUM: CPT | Mod: HCNC

## 2024-06-01 PROCEDURE — 80048 BASIC METABOLIC PNL TOTAL CA: CPT | Mod: HCNC

## 2024-06-01 RX ORDER — ACETAMINOPHEN 325 MG/1
650 TABLET ORAL EVERY 6 HOURS PRN
Status: DISCONTINUED | OUTPATIENT
Start: 2024-06-01 | End: 2024-06-03 | Stop reason: HOSPADM

## 2024-06-01 RX ADMIN — CARVEDILOL 6.25 MG: 6.25 TABLET, FILM COATED ORAL at 09:06

## 2024-06-01 RX ADMIN — CALCITRIOL 0.5 MCG: 0.5 CAPSULE, LIQUID FILLED ORAL at 09:06

## 2024-06-01 RX ADMIN — APIXABAN 5 MG: 5 TABLET, FILM COATED ORAL at 09:06

## 2024-06-01 RX ADMIN — ALLOPURINOL 50 MG: 300 TABLET ORAL at 09:06

## 2024-06-01 RX ADMIN — HYDRALAZINE HYDROCHLORIDE 50 MG: 50 TABLET ORAL at 09:06

## 2024-06-01 RX ADMIN — Medication 6 MG: at 09:06

## 2024-06-01 RX ADMIN — FAMOTIDINE 20 MG: 20 TABLET ORAL at 09:06

## 2024-06-01 RX ADMIN — INSULIN ASPART 1 UNITS: 100 INJECTION, SOLUTION INTRAVENOUS; SUBCUTANEOUS at 09:06

## 2024-06-01 RX ADMIN — ATORVASTATIN CALCIUM 80 MG: 40 TABLET, FILM COATED ORAL at 09:06

## 2024-06-01 RX ADMIN — CALCIUM ACETATE 667 MG: 667 CAPSULE ORAL at 12:06

## 2024-06-01 RX ADMIN — HYDRALAZINE HYDROCHLORIDE 50 MG: 50 TABLET ORAL at 03:06

## 2024-06-01 RX ADMIN — ACETAMINOPHEN 650 MG: 325 TABLET ORAL at 03:06

## 2024-06-01 RX ADMIN — CALCIUM ACETATE 667 MG: 667 CAPSULE ORAL at 09:06

## 2024-06-01 RX ADMIN — SERTRALINE 100 MG: 100 TABLET, FILM COATED ORAL at 09:06

## 2024-06-01 RX ADMIN — CLOPIDOGREL BISULFATE 75 MG: 75 TABLET ORAL at 09:06

## 2024-06-01 RX ADMIN — INSULIN ASPART 2 UNITS: 100 INJECTION, SOLUTION INTRAVENOUS; SUBCUTANEOUS at 05:06

## 2024-06-01 RX ADMIN — CALCIUM ACETATE 667 MG: 667 CAPSULE ORAL at 05:06

## 2024-06-01 NOTE — SUBJECTIVE & OBJECTIVE
Interval History:  NAEON. AFVSS.  Patient evaluated at bedside, tearful. States she wants to go home.    Randell called by me, social work and charge nurse. Reported he would be at the hospital shortly however has not arrived. No clear, safe discharge plan at this time.     Review of Systems   Unable to perform ROS: Dementia     Objective:     Vital Signs (Most Recent):  Temp: 98 °F (36.7 °C) (06/01/24 1102)  Pulse: 70 (06/01/24 1452)  Resp: 20 (06/01/24 1102)  BP: 132/82 (06/01/24 1102)  SpO2: (!) 93 % (06/01/24 1102) Vital Signs (24h Range):  Temp:  [97.6 °F (36.4 °C)-98 °F (36.7 °C)] 98 °F (36.7 °C)  Pulse:  [70-83] 70  Resp:  [17-20] 20  SpO2:  [92 %-96 %] 93 %  BP: (117-167)/(57-82) 132/82     Weight: 70.1 kg (154 lb 8.7 oz)  Body mass index is 47.16 kg/m².    Intake/Output Summary (Last 24 hours) at 6/1/2024 1600  Last data filed at 5/31/2024 1700  Gross per 24 hour   Intake 500 ml   Output 2500 ml   Net -2000 ml         Physical Exam  Vitals and nursing note reviewed.   Constitutional:       General: She is not in acute distress.     Appearance: She is well-developed. She is obese.   HENT:      Head: Normocephalic and atraumatic.   Cardiovascular:      Rate and Rhythm: Normal rate and regular rhythm.      Heart sounds: Normal heart sounds.   Pulmonary:      Effort: Pulmonary effort is normal. No respiratory distress.   Abdominal:      General: There is no distension.   Musculoskeletal:         General: No tenderness. Normal range of motion.      Comments: Bilateral AKA    Skin:     General: Skin is warm and dry.      Findings: No rash.   Neurological:      Mental Status: She is alert and oriented to person, place, and time.      Comments: Unable to assess orientation, non-verbal. Makes eye contact when name called. Tearful on exam   Psychiatric:         Behavior: Behavior normal.         Thought Content: Thought content normal.         Judgment: Judgment normal.       Significant Labs: All pertinent labs  within the past 24 hours have been reviewed.    Significant Imaging: I have reviewed all pertinent imaging results/findings within the past 24 hours.

## 2024-06-01 NOTE — NURSING
Nurses Note -- 4 Eyes      6/1/2024   6:38 PM      Skin assessed during: Q Shift Change      [] No Altered Skin Integrity Present    []Prevention Measures Documented      [x] Yes- Altered Skin Integrity Present or Discovered   [] LDA Added if Not in Epic (Describe Wound)   [] New Altered Skin Integrity was Present on Admit and Documented in LDA   [x] Wound Image Taken    Wound Care Consulted? No    Attending Nurse:  Ailyn Lin RN/Staff Member:   Lisa    Active wound care orders are in place.

## 2024-06-01 NOTE — PROGRESS NOTES
05/31/24 1733   Vital Signs   Temp 97.6 °F (36.4 °C)   Temp Source Axillary   Pulse 79   Heart Rate Source Monitor   Resp 18   SpO2 96 %   /67   MAP (mmHg) 96   BP Location Right arm   Patient Position Lying   Assessments (Pre/Post)   Level of Consciousness (AVPU) alert     Pt arrived back to the unit alert. RJ orientation, pt nonverbal at this time. Plan of care continues.

## 2024-06-01 NOTE — NURSING
Call placed to Mr. Connelly in reference to the plan of care for Mrs. Connelly. Mr. Connelly states that he is on his way to the hospital and will arrive shortly. RN will reach out to Tx team upon arrival.

## 2024-06-01 NOTE — PROGRESS NOTES
"[facility] [address] [place] [address] Medicine  Progress Note    Patient Name: Suyapa Connelly  MRN: [id number]  Patient Class: OP- Observation   Admission Date: 5/28/2024  Length of Stay: 0 days  Attending Physician: Yury Daily MD  Primary Care Provider: Magen Christensen MD      Subjective:     Principal Problem:Discharge planning issues    HPI:  Suyapa Connelly is a 58 y.o. female with PMHx of b/l AKAs, ESRD on HD, CAD s/p CABG, afib on eliquis, HTN here with AMS.  at bedside assists with history. He reports patient was in her normal state of health until yesterday afternoon when they were driving and stopped at a gas station. She suddenly became rigid with her arms extending outwards and her head back with her eyes rolling backwards then suddenly relaxed and had a large bowel movement. Since this episode she has been confused and altered with abnormal speech and generalized weakness.  has never witnessed a similar episode. She skipped HD Monday, last full session Friday. History from patient limited. She follows some commands and is tearful ad reports she is afraid of being in the hospital, does not answer questions.    ED: HR in 90s and briefly hypertensive but otherwise HDS and afebrile. K 5.2. bicarb 20. Glucose 264. BUN 74. Cr 5.8. Albumin 1.1. T bili 1.1. . Hbg 11.3. She was a stroke code in the ED with NIHSS 5 and had CTA without acute intracranial process. Vascular neurology felt symptoms were stroke mimic/post ictal and recommended EEG/admission. She continue to be encephalopathic in ED and was admitted to .     Overview/Hospital Course:  Suyapa Connelly was placed in  observation for workup of encephalopathy. Patient continues to be confused and minimally engaged. She is able to follow commands with prompting. UA infectious, reflex culture pending. Start empiric rocephin - cultures did not isolate an organism in predominance. EEG obtained: "No epileptiform discharges, " "periodic discharges, lateralized rhythmic delta activity or electrographic seizures were seen. IMPRESSION: "Abnormal EEG due to a mild to moderate generalized cerebral dysfunction with no electrographic seizures or indications of seizure tendency."  MRI brain without acute changes, revealed "Overall degree of parenchymal volume loss has notably progressed when compared to the 05/06/2018 brain MRI. Neuropsychiatric consultation could be considered, as warranted clinically."    Medically ready to be discharged, discussed with  via phone call who initially wished to pursue NH placement. Discharge complicated as patient's family stopped answering the phone or all available numbers were out of service. APS report made per CM.     Interval History:  NAEON. AFVSS.  Patient evaluated at bedside, tearful. States she wants to go home.    Randell called by me, social work and charge nurse. Reported he would be at the hospital shortly however has not arrived. No clear, safe discharge plan at this time.     Review of Systems   Unable to perform ROS: Dementia     Objective:     Vital Signs (Most Recent):  Temp: 98 °F (36.7 °C) (06/01/24 1102)  Pulse: 70 (06/01/24 1452)  Resp: 20 (06/01/24 1102)  BP: 132/82 (06/01/24 1102)  SpO2: (!) 93 % (06/01/24 1102) Vital Signs (24h Range):  Temp:  [97.6 °F (36.4 °C)-98 °F (36.7 °C)] 98 °F (36.7 °C)  Pulse:  [70-83] 70  Resp:  [17-20] 20  SpO2:  [92 %-96 %] 93 %  BP: (117-167)/(57-82) 132/82     Weight: 70.1 kg (154 lb 8.7 oz)  Body mass index is 47.16 kg/m².    Intake/Output Summary (Last 24 hours) at 6/1/2024 1600  Last data filed at 5/31/2024 1700  Gross per 24 hour   Intake 500 ml   Output 2500 ml   Net -2000 ml         Physical Exam  Vitals and nursing note reviewed.   Constitutional:       General: She is not in acute distress.     Appearance: She is well-developed. She is obese.   HENT:      Head: Normocephalic and atraumatic.   Cardiovascular:      Rate and Rhythm: Normal " rate and regular rhythm.      Heart sounds: Normal heart sounds.   Pulmonary:      Effort: Pulmonary effort is normal. No respiratory distress.   Abdominal:      General: There is no distension.   Musculoskeletal:         General: No tenderness. Normal range of motion.      Comments: Bilateral AKA    Skin:     General: Skin is warm and dry.      Findings: No rash.   Neurological:      Mental Status: She is alert and oriented to person, place, and time.      Comments: Unable to assess orientation, non-verbal. Makes eye contact when name called. Tearful on exam   Psychiatric:         Behavior: Behavior normal.         Thought Content: Thought content normal.         Judgment: Judgment normal.       Significant Labs: All pertinent labs within the past 24 hours have been reviewed.    Significant Imaging: I have reviewed all pertinent imaging results/findings within the past 24 hours.    Assessment/Plan:      * Discharge planning issues  - work up for acute encephalopathy completed, no acute or reversible processes  - suspect this is chronic underlying undiagnosed dementia or microvascular disease   - difficulty getting in touch with family to discuss discharge options   - APS report made per CM  - continue to work with SW and CM for safe discharge planning     Acute encephalopathy  58F presenting after an episode of full body rigidity, bowel incontinence, and now persistent confusion. Se was a stroke code on arrival to ED, CTA with no acute LVO, vascular neurology recommending admission/EEG. No seizure history.    - Afebrile and HDS on admission  - Elevated BUN on admission, nephrology consult for HD  - EEG ordered: No epileptiform discharges, periodic discharges, lateralized rhythmic delta activity or electrographic seizures were seen.  IMPRESSION: Abnormal EEG due to a mild to moderate generalized cerebral dysfunction with no electrographic seizures or indications of seizure tendency.  - Seizure, aspiration, fall  precautions  - Neuro checks q4h  - SLP consult   Minced & Moist Diet - IDDSI Level 5, Thin liquids - IDDSI Level 0   - UDS negative  - Ammonia wnl   - UA infectious, but culture with no organisms in predominance   - treated with rocephin x3 days  - MRI brain ordered, no acute process    Severe obesity (BMI >= 40)  Body mass index is 46.38 kg/m². Morbid obesity complicates all aspects of disease management from diagnostic modalities to treatment.    ESRD (end stage renal disease)  - HD MWF  - Nephrology consulted for HD    S/P AKA (above knee amputation) bilateral  - Noted. Fall precautions.    Chronic combined systolic and diastolic heart failure  Patient is identified as having Combined Systolic and Diastolic heart failure that is Chronic. Patient is off CHF pathway.CHF is currently controlled. Continue Beta Blocker and Nitrate/Vasodilator. Volume management with HD.  - Place on fluid restriction of 1.5 L. Continue to stress to patient importance of self efficacy and  on diet for CHF.   - Monitor clinical status closely. Monitor on telemetry.   - Monitor strict Is&Os and daily weights.    Results for orders placed during the hospital encounter of 05/07/24    Echo    Interpretation Summary    Left Ventricle: The left ventricle is mildly dilated. Ventricular mass is normal. Normal wall thickness. Severe global hypokinesis present. There is severely reduced systolic function. Ejection fraction by visual approximation is 15%. There is diastolic dysfunction.    Right Ventricle: Severe right ventricular enlargement. Wall thickness is normal. Right ventricle wall motion has global hypokinesis. Systolic function is mildly reduced.    Left Atrium: Left atrium is severely dilated. There is an atrial septal aneurysm.    Right Atrium: Right atrium is dilated.    Aortic Valve: There is mild aortic regurgitation.    Mitral Valve: There is mild regurgitation.    Tricuspid Valve: There is annular dilation present. There is  normal leaflet mobility. There is severe functional regurgitation.    Pulmonary Artery: The estimated pulmonary artery systolic pressure is at least 24 mmHg. PASP is likely under-estimated in the setting of severe tricuspid regurgitation.    IVC/SVC: Central venous pressure of at least 8 mmHg.    No vegetation seen.       Paroxysmal atrial fibrillation  Patient with Paroxysmal (<7 days) atrial fibrillation which is controlled currently with Beta Blocker. Patient is currently in sinus rhythm.[id number] Score: 3. Anticoagulation indicated. Anticoagulation done with eliquis .    Anemia due to chronic kidney disease, on chronic dialysis  Patient's anemia is currently controlled. Has not received any PRBCs to date. Etiology likely d/t chronic disease due to ESRD  Current CBC reviewed-   Lab Results   Component Value Date    HGB 11.3 (L) 05/28/2024    HCT 33.6 (L) 05/28/2024     Monitor serial CBC and transfuse if patient becomes hemodynamically unstable, symptomatic or H/H drops below 7/21.    CAD (coronary artery disease)  Patient with known CAD s/p CABG, which is controlled Will continue Plavix and Statin and monitor for S/Sx of angina/ACS. Continue to monitor on telemetry.     Type 2 diabetes mellitus with hyperglycemia, with long-term current use of insulin  Patient's FSGs are uncontrolled due to hyperglycemia on current medication regimen.  Last A1c reviewed-   Lab Results   Component Value Date    HGBA1C 8.0 (H) 04/24/2024     Most recent fingerstick glucose reviewed-   Recent Labs   Lab 05/28/24  2142   POCTGLUCOSE 284*     Current correctional scale  Low  Maintain anti-hyperglycemic dose as follows-   Antihyperglycemics (From admission, onward)      Start     Stop Route Frequency Ordered    05/29/24 0101  insulin aspart U-100 pen 0-5 Units         -- SubQ Before meals & nightly PRN 05/29/24 0001        Hold Oral hypoglycemics while patient is in the hospital.   DM diet when passes sangita COE (generalized  anxiety disorder)  - Continue home sertraline       VTE Risk Mitigation (From admission, onward)           Ordered     heparin (porcine) injection 1,000 Units  As needed (PRN)         05/29/24 1601     apixaban tablet 5 mg  2 times daily         05/29/24 0036     Reason for No Pharmacological VTE Prophylaxis  Once        Question:  Reasons:  Answer:  Already adequately anticoagulated on oral Anticoagulants    05/29/24 0001     IP VTE HIGH RISK PATIENT  Once         05/29/24 0001     Reason for no Mechanical VTE Prophylaxis  Once        Comments: Bilateral AKA, on eliquis   Question:  Reasons:  Answer:  Physician Provided (leave comment)    05/29/24 0001                    Discharge Planning   DEVAN: 6/1/2024     Code Status: Full Code   Is the patient medically ready for discharge?:     Reason for patient still in hospital (select all that apply): Pending disposition  Discharge Plan A: Home with family          Aleta Rod PA-C  Department of Hospital Medicine   [facility]H

## 2024-06-01 NOTE — NURSING
"Pt alert times 2 lying in bed, HOB 45 degrees. Pt inconsistent in responses to questions, follows commands. Offered HS snack of yogurt, patient said "yes". Given instruction that meds are being given with yogurt, patient nodded understanding. Pt took PO meds and ate 100% of HS snack. Bed in low locked position call light in reach. No other needs at this time.   "

## 2024-06-01 NOTE — NURSING
"Bed alarm ringing, patient sitting up in bed, no attempt to exit. Patient teary eyed and nods when asked if she is sad. Pt nods when asked if she misses being home, nods yes when asked if she knows where she is. States "hospital" but does not answer when asked which one. Pt smiles broadly when told she may be discharged tomorrow. This RN sat with patient for a few minutes and offered tissues, patient appears to be comforted by company, but does not offer any verbal response. After a few minutes patient lies back in bed closes eyes and rests. No other needs at this time, bed in low locked position call light in reach, reminded to call for any needs.   "

## 2024-06-01 NOTE — NURSING
" at bedside, inst that CM made 3 attempts to contact him today about discharge and was unable to reach anyone in the family.  states he has no missed calls from today or yesterday. Showed this RN call log on cell phone, noted no calls from this facility Friday.  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 Inst that note states his voicemail is full, he agreed that's possible. He states he has been working non stop since his wife has been in the hospital because he knows he cannot work after she's discharged. He finished work a few hours ago and picked up food for them and is now at bedside to take care of his wife and feed her. He thanked staff for all they do to help his wife. This RN also updated him on treatment plan, successful evening med pass and HS snack. Patient is sitting up in bed smiling at her , nods head "yes" when asked if she is happier now. Bed in low locked position, family to remain at bedside, call light in reach.   "

## 2024-06-01 NOTE — PLAN OF CARE
Problem: Adult Inpatient Plan of Care  Goal: Plan of Care Review  Outcome: Progressing  Goal: Patient-Specific Goal (Individualized)  Outcome: Progressing  Goal: Absence of Hospital-Acquired Illness or Injury  Outcome: Progressing  Goal: Optimal Comfort and Wellbeing  Outcome: Progressing  Goal: Readiness for Transition of Care  Outcome: Progressing     Problem: Bariatric Environmental Safety  Goal: Safety Maintained with Care  Outcome: Progressing     Problem: Infection  Goal: Absence of Infection Signs and Symptoms  Outcome: Progressing     Problem: Wound  Goal: Optimal Coping  Outcome: Progressing  Goal: Optimal Functional Ability  Outcome: Progressing  Goal: Absence of Infection Signs and Symptoms  Outcome: Progressing  Goal: Improved Oral Intake  Outcome: Progressing  Goal: Optimal Pain Control and Function  Outcome: Progressing  Goal: Skin Health and Integrity  Outcome: Progressing  Goal: Optimal Wound Healing  Outcome: Progressing     Problem: Sepsis/Septic Shock  Goal: Optimal Coping  Outcome: Progressing  Goal: Absence of Bleeding  Outcome: Progressing  Goal: Blood Glucose Level Within Targeted Range  Outcome: Progressing  Goal: Absence of Infection Signs and Symptoms  Outcome: Progressing  Goal: Optimal Nutrition Intake  Outcome: Progressing     Problem: Diabetes Comorbidity  Goal: Blood Glucose Level Within Targeted Range  Outcome: Progressing     Problem: Skin Injury Risk Increased  Goal: Skin Health and Integrity  Outcome: Progressing     Problem: Hemodialysis  Goal: Safe, Effective Therapy Delivery  Outcome: Progressing  Goal: Effective Tissue Perfusion  Outcome: Progressing  Goal: Absence of Infection Signs and Symptoms  Outcome: Progressing     Problem: Chronic Kidney Disease  Goal: Optimal Coping with Chronic Illness  Outcome: Progressing  Goal: Electrolyte Balance  Outcome: Progressing  Goal: Fluid Balance  Outcome: Progressing   Pt progressing toward goals. No distress noted. No falls or  injuries during shift. Pt bed in lowest position. Side rails x2. Bed alarm activated. Call bell and personal belongs within reach. Telemetry maintained. Safety precautions maintained.

## 2024-06-02 LAB
POCT GLUCOSE: 158 MG/DL (ref 70–110)
POCT GLUCOSE: 176 MG/DL (ref 70–110)
POCT GLUCOSE: 232 MG/DL (ref 70–110)
POCT GLUCOSE: 245 MG/DL (ref 70–110)

## 2024-06-02 PROCEDURE — 63600175 PHARM REV CODE 636 W HCPCS: Mod: HCNC

## 2024-06-02 PROCEDURE — 25000003 PHARM REV CODE 250: Mod: HCNC | Performed by: PHYSICIAN ASSISTANT

## 2024-06-02 PROCEDURE — 94761 N-INVAS EAR/PLS OXIMETRY MLT: CPT | Mod: HCNC

## 2024-06-02 PROCEDURE — G0378 HOSPITAL OBSERVATION PER HR: HCPCS | Mod: HCNC

## 2024-06-02 PROCEDURE — 96372 THER/PROPH/DIAG INJ SC/IM: CPT

## 2024-06-02 PROCEDURE — 25000003 PHARM REV CODE 250: Mod: HCNC

## 2024-06-02 RX ORDER — OXYCODONE HYDROCHLORIDE 5 MG/1
5 TABLET ORAL ONCE
Status: COMPLETED | OUTPATIENT
Start: 2024-06-02 | End: 2024-06-02

## 2024-06-02 RX ADMIN — SERTRALINE 100 MG: 100 TABLET, FILM COATED ORAL at 08:06

## 2024-06-02 RX ADMIN — APIXABAN 5 MG: 5 TABLET, FILM COATED ORAL at 08:06

## 2024-06-02 RX ADMIN — HYDRALAZINE HYDROCHLORIDE 50 MG: 50 TABLET ORAL at 08:06

## 2024-06-02 RX ADMIN — CALCITRIOL 0.5 MCG: 0.5 CAPSULE, LIQUID FILLED ORAL at 08:06

## 2024-06-02 RX ADMIN — INSULIN ASPART 2 UNITS: 100 INJECTION, SOLUTION INTRAVENOUS; SUBCUTANEOUS at 05:06

## 2024-06-02 RX ADMIN — ATORVASTATIN CALCIUM 80 MG: 40 TABLET, FILM COATED ORAL at 08:06

## 2024-06-02 RX ADMIN — INSULIN ASPART 1 UNITS: 100 INJECTION, SOLUTION INTRAVENOUS; SUBCUTANEOUS at 08:06

## 2024-06-02 RX ADMIN — CARVEDILOL 6.25 MG: 6.25 TABLET, FILM COATED ORAL at 08:06

## 2024-06-02 RX ADMIN — OXYCODONE 5 MG: 5 TABLET ORAL at 01:06

## 2024-06-02 RX ADMIN — CALCIUM ACETATE 667 MG: 667 CAPSULE ORAL at 08:06

## 2024-06-02 RX ADMIN — CLOPIDOGREL BISULFATE 75 MG: 75 TABLET ORAL at 08:06

## 2024-06-02 RX ADMIN — HYDRALAZINE HYDROCHLORIDE 50 MG: 50 TABLET ORAL at 03:06

## 2024-06-02 RX ADMIN — CALCIUM ACETATE 667 MG: 667 CAPSULE ORAL at 05:06

## 2024-06-02 RX ADMIN — FAMOTIDINE 20 MG: 20 TABLET ORAL at 08:06

## 2024-06-02 NOTE — NURSING
Nurses Note -- 4 Eyes      6/2/2024   3:08 AM      Skin assessed during: Q Shift Change      [] No Altered Skin Integrity Present    []Prevention Measures Documented      [x] Yes- Altered Skin Integrity Present or Discovered   [x] LDA Added if Not in Epic (Describe Wound)   [x] New Altered Skin Integrity was Present on Admit and Documented in LDA   [x] Wound Image Taken    Wound Care Consulted? Yes, Wound care ongoing    Attending Nurse:  Daysi Lin RN/Staff Member: Sarah      No new wounds noted

## 2024-06-02 NOTE — PLAN OF CARE
CM contacted by provider to follow up w/ pt/spouse on d/c as pt is medically ready. Pt was expected to d/c to home w/ HH. CM, provider and spouse spoke in hallway - spouse has already been in communication w/ [facility] and is ready to move forward w/ NH placement. Spouse stated he and dgtr will go to [facility] in the AM to complete all necessary paperwork. CM forwarded a referral to [facility] via [facility]. Pt will need Passr/142 in the AM.     TAINA CurranN, RN, [facility]  Transitional Care Manager  [phone]  [email or web address]    [facility] 11H  Discharge Reassessment    Primary Care Provider: Magen Christensen MD    Expected Discharge Date: 6/3/2024    Reassessment (most recent)       Discharge Reassessment - 06/02/24 1639          Discharge Reassessment    Assessment Type Discharge Planning Reassessment     Did the patient's condition or plan change since previous assessment? Yes     Discharge Plan discussed with: Spouse/sig other     Name(s) and Number(s) Randell     Discharge Plan A [facility]     Discharge Plan B Home with family;Home Health     DME Needed Upon Discharge  none     Transition of Care Barriers Does not adhere to care plan;Social     Why the patient remains in the hospital Placement issues        Post-Acute Status    Post-Acute Authorization Placement     Post-Acute Placement Status Referrals Sent     Discharge Delays Patient and Family Barriers

## 2024-06-02 NOTE — PROGRESS NOTES
"[facility] [address] [place] [address] Medicine  Progress Note    Patient Name: Suyapa Connelly  MRN: [id number]  Patient Class: OP- Observation   Admission Date: 5/28/2024  Length of Stay: 0 days  Attending Physician: Yury Daily MD  Primary Care Provider: Magen Christensen MD    Subjective:     Principal Problem:Discharge planning issues    HPI:  Suyapa Connelly is a 58 y.o. female with PMHx of b/l AKAs, ESRD on HD, CAD s/p CABG, afib on eliquis, HTN here with AMS.  at bedside assists with history. He reports patient was in her normal state of health until yesterday afternoon when they were driving and stopped at a gas station. She suddenly became rigid with her arms extending outwards and her head back with her eyes rolling backwards then suddenly relaxed and had a large bowel movement. Since this episode she has been confused and altered with abnormal speech and generalized weakness.  has never witnessed a similar episode. She skipped HD Monday, last full session Friday. History from patient limited. She follows some commands and is tearful ad reports she is afraid of being in the hospital, does not answer questions.    ED: HR in 90s and briefly hypertensive but otherwise HDS and afebrile. K 5.2. bicarb 20. Glucose 264. BUN 74. Cr 5.8. Albumin 1.1. T bili 1.1. . Hbg 11.3. She was a stroke code in the ED with NIHSS 5 and had CTA without acute intracranial process. Vascular neurology felt symptoms were stroke mimic/post ictal and recommended EEG/admission. She continue to be encephalopathic in ED and was admitted to .     Overview/Hospital Course:  Suyapa Connelly was placed in  observation for workup of encephalopathy. Patient continues to be confused and minimally engaged. She is able to follow commands with prompting. UA infectious, reflex culture pending. Start empiric rocephin - cultures did not isolate an organism in predominance. EEG obtained: "No epileptiform discharges, " "periodic discharges, lateralized rhythmic delta activity or electrographic seizures were seen. IMPRESSION: "Abnormal EEG due to a mild to moderate generalized cerebral dysfunction with no electrographic seizures or indications of seizure tendency."  MRI brain without acute changes, revealed "Overall degree of parenchymal volume loss has notably progressed when compared to the 05/06/2018 brain MRI. Neuropsychiatric consultation could be considered, as warranted clinically."    Medically ready to be discharged, discussed with  via phone call who initially wished to pursue NH placement. Discharge complicated as patient's family stopped answering the phone or all available numbers were out of service. APS report made per CM.     Interval History:  NAEON. AFVSS.  Patient evaluated at bedside, no complaints at this time.  Randell at bedside, states he and his daughter     Review of Systems   Unable to perform ROS: Dementia     Objective:     Vital Signs (Most Recent):  Temp: 98 °F (36.7 °C) (06/02/24 1107)  Pulse: 85 (06/02/24 1107)  Resp: 17 (06/02/24 1107)  BP: (!) 183/74 (06/02/24 1107)  SpO2: (!) 93 % (06/02/24 1107) Vital Signs (24h Range):  Temp:  [97 °F (36.1 °C)-98.2 °F (36.8 °C)] 98 °F (36.7 °C)  Pulse:  [70-89] 85  Resp:  [16-20] 17  SpO2:  [93 %-98 %] 93 %  BP: (122-183)/(58-77) 183/74     Weight: 70.1 kg (154 lb 8.7 oz)  Body mass index is 47.16 kg/m².  No intake or output data in the 24 hours ending 06/02/24 1121      Physical Exam  Vitals and nursing note reviewed.   Constitutional:       General: She is not in acute distress.     Appearance: She is well-developed. She is obese.   HENT:      Head: Normocephalic and atraumatic.   Cardiovascular:      Rate and Rhythm: Normal rate and regular rhythm.      Heart sounds: Normal heart sounds.   Pulmonary:      Effort: Pulmonary effort is normal. No respiratory distress.   Abdominal:      General: There is no distension.   Musculoskeletal:         " General: No tenderness. Normal range of motion.      Comments: Bilateral AKA    Skin:     General: Skin is warm and dry.      Findings: No rash.   Neurological:      Mental Status: She is alert and oriented to person, place, and time.      Comments: Unable to assess orientation, minimally verbal on exam. Denies pain.   Psychiatric:         Behavior: Behavior normal.         Thought Content: Thought content normal.         Judgment: Judgment normal.       Significant Labs: All pertinent labs within the past 24 hours have been reviewed.    Significant Imaging: I have reviewed all pertinent imaging results/findings within the past 24 hours.    Assessment/Plan:      * Discharge planning issues  - work up for acute encephalopathy completed, no acute or reversible processes  - suspect this is chronic underlying undiagnosed dementia or microvascular disease   - difficulty getting in touch with family to discuss discharge options   - APS report made per CM  - continue to work with SW and CM for safe discharge planning     Acute encephalopathy  58F presenting after an episode of full body rigidity, bowel incontinence, and now persistent confusion. Se was a stroke code on arrival to ED, CTA with no acute LVO, vascular neurology recommending admission/EEG. No seizure history.    - Afebrile and HDS on admission  - Elevated BUN on admission, nephrology consult for HD  - EEG ordered: No epileptiform discharges, periodic discharges, lateralized rhythmic delta activity or electrographic seizures were seen.  IMPRESSION: Abnormal EEG due to a mild to moderate generalized cerebral dysfunction with no electrographic seizures or indications of seizure tendency.  - Seizure, aspiration, fall precautions  - Neuro checks q4h  - SLP consult   Minced & Moist Diet - IDDSI Level 5, Thin liquids - IDDSI Level 0   - UDS negative  - Ammonia wnl   - UA infectious, but culture with no organisms in predominance   - treated with rocephin x3 days  -  MRI brain ordered, no acute process    Severe obesity (BMI >= 40)  Body mass index is 46.38 kg/m². Morbid obesity complicates all aspects of disease management from diagnostic modalities to treatment.    ESRD (end stage renal disease)  - HD MWF  - Nephrology consulted for HD    S/P AKA (above knee amputation) bilateral  - Noted. Fall precautions.    Chronic combined systolic and diastolic heart failure  Patient is identified as having Combined Systolic and Diastolic heart failure that is Chronic. Patient is off CHF pathway.CHF is currently controlled. Continue Beta Blocker and Nitrate/Vasodilator. Volume management with HD.  - Place on fluid restriction of 1.5 L. Continue to stress to patient importance of self efficacy and  on diet for CHF.   - Monitor clinical status closely. Monitor on telemetry.   - Monitor strict Is&Os and daily weights.    Results for orders placed during the hospital encounter of 05/07/24    Echo    Interpretation Summary    Left Ventricle: The left ventricle is mildly dilated. Ventricular mass is normal. Normal wall thickness. Severe global hypokinesis present. There is severely reduced systolic function. Ejection fraction by visual approximation is 15%. There is diastolic dysfunction.    Right Ventricle: Severe right ventricular enlargement. Wall thickness is normal. Right ventricle wall motion has global hypokinesis. Systolic function is mildly reduced.    Left Atrium: Left atrium is severely dilated. There is an atrial septal aneurysm.    Right Atrium: Right atrium is dilated.    Aortic Valve: There is mild aortic regurgitation.    Mitral Valve: There is mild regurgitation.    Tricuspid Valve: There is annular dilation present. There is normal leaflet mobility. There is severe functional regurgitation.    Pulmonary Artery: The estimated pulmonary artery systolic pressure is at least 24 mmHg. PASP is likely under-estimated in the setting of severe tricuspid regurgitation.     IVC/SVC: Central venous pressure of at least 8 mmHg.    No vegetation seen.       Paroxysmal atrial fibrillation  Patient with Paroxysmal (<7 days) atrial fibrillation which is controlled currently with Beta Blocker. Patient is currently in sinus rhythm.[id number] Score: 3. Anticoagulation indicated. Anticoagulation done with eliquis .    Anemia due to chronic kidney disease, on chronic dialysis  Patient's anemia is currently controlled. Has not received any PRBCs to date. Etiology likely d/t chronic disease due to ESRD  Current CBC reviewed-   Lab Results   Component Value Date    HGB 11.3 (L) 05/28/2024    HCT 33.6 (L) 05/28/2024     Monitor serial CBC and transfuse if patient becomes hemodynamically unstable, symptomatic or H/H drops below 7/21.    CAD (coronary artery disease)  Patient with known CAD s/p CABG, which is controlled Will continue Plavix and Statin and monitor for S/Sx of angina/ACS. Continue to monitor on telemetry.     Type 2 diabetes mellitus with hyperglycemia, with long-term current use of insulin  Patient's FSGs are uncontrolled due to hyperglycemia on current medication regimen.  Last A1c reviewed-   Lab Results   Component Value Date    HGBA1C 8.0 (H) 04/24/2024     Most recent fingerstick glucose reviewed-   Recent Labs   Lab 05/28/24  2142   POCTGLUCOSE 284*     Current correctional scale  Low  Maintain anti-hyperglycemic dose as follows-   Antihyperglycemics (From admission, onward)      Start     Stop Route Frequency Ordered    05/29/24 0101  insulin aspart U-100 pen 0-5 Units         -- SubQ Before meals & nightly PRN 05/29/24 0001        Hold Oral hypoglycemics while patient is in the hospital.   DM diet when passes quesada    CAROLIN (generalized anxiety disorder)  - Continue home sertraline       VTE Risk Mitigation (From admission, onward)           Ordered     heparin (porcine) injection 1,000 Units  As needed (PRN)         05/29/24 1601     apixaban tablet 5 mg  2 times daily          05/29/24 0036     Reason for No Pharmacological VTE Prophylaxis  Once        Question:  Reasons:  Answer:  Already adequately anticoagulated on oral Anticoagulants    05/29/24 0001     IP VTE HIGH RISK PATIENT  Once         05/29/24 0001     Reason for no Mechanical VTE Prophylaxis  Once        Comments: Bilateral AKA, on eliquis   Question:  Reasons:  Answer:  Physician Provided (leave comment)    05/29/24 0001                    Discharge Planning   DEVAN: 6/3/2024     Code Status: Full Code   Is the patient medically ready for discharge?:     Reason for patient still in hospital (select all that apply): Pending disposition  Discharge Plan A: Home with family        Aleta Rod PA-C  Department of Hospital Medicine   [facility]H

## 2024-06-02 NOTE — NURSING
Nurses Note -- 4 Eyes      6/2/2024   5:33 PM      Skin assessed during: Q Shift Change      [] No Altered Skin Integrity Present    []Prevention Measures Documented      [x] Yes- Altered Skin Integrity Present or Discovered   [] LDA Added if Not in Epic (Describe Wound)   [] New Altered Skin Integrity was Present on Admit and Documented in LDA   [x] Wound Image Taken    Wound Care Consulted? No    Attending Nurse:  Allegra     Second RN/Staff Member:   Lisa    Active wound care orders are in place.

## 2024-06-02 NOTE — SUBJECTIVE & OBJECTIVE
Interval History:  NAEON. AFVSS.  Patient evaluated at bedside, no complaints at this time.  Randell at bedside, states he and his daughter     Review of Systems   Unable to perform ROS: Dementia     Objective:     Vital Signs (Most Recent):  Temp: 98 °F (36.7 °C) (06/02/24 1107)  Pulse: 85 (06/02/24 1107)  Resp: 17 (06/02/24 1107)  BP: (!) 183/74 (06/02/24 1107)  SpO2: (!) 93 % (06/02/24 1107) Vital Signs (24h Range):  Temp:  [97 °F (36.1 °C)-98.2 °F (36.8 °C)] 98 °F (36.7 °C)  Pulse:  [70-89] 85  Resp:  [16-20] 17  SpO2:  [93 %-98 %] 93 %  BP: (122-183)/(58-77) 183/74     Weight: 70.1 kg (154 lb 8.7 oz)  Body mass index is 47.16 kg/m².  No intake or output data in the 24 hours ending 06/02/24 1121      Physical Exam  Vitals and nursing note reviewed.   Constitutional:       General: She is not in acute distress.     Appearance: She is well-developed. She is obese.   HENT:      Head: Normocephalic and atraumatic.   Cardiovascular:      Rate and Rhythm: Normal rate and regular rhythm.      Heart sounds: Normal heart sounds.   Pulmonary:      Effort: Pulmonary effort is normal. No respiratory distress.   Abdominal:      General: There is no distension.   Musculoskeletal:         General: No tenderness. Normal range of motion.      Comments: Bilateral AKA    Skin:     General: Skin is warm and dry.      Findings: No rash.   Neurological:      Mental Status: She is alert and oriented to person, place, and time.      Comments: Unable to assess orientation, minimally verbal on exam. Denies pain.   Psychiatric:         Behavior: Behavior normal.         Thought Content: Thought content normal.         Judgment: Judgment normal.       Significant Labs: All pertinent labs within the past 24 hours have been reviewed.    Significant Imaging: I have reviewed all pertinent imaging results/findings within the past 24 hours.

## 2024-06-03 VITALS
WEIGHT: 154.56 LBS | HEIGHT: 55 IN | BODY MASS INDEX: 35.77 KG/M2 | HEART RATE: 82 BPM | TEMPERATURE: 98 F | OXYGEN SATURATION: 95 % | RESPIRATION RATE: 20 BRPM | SYSTOLIC BLOOD PRESSURE: 147 MMHG | DIASTOLIC BLOOD PRESSURE: 66 MMHG

## 2024-06-03 LAB
ALBUMIN SERPL BCP-MCNC: 2.7 G/DL (ref 3.5–5.2)
ANION GAP SERPL CALC-SCNC: 15 MMOL/L (ref 8–16)
BUN SERPL-MCNC: 53 MG/DL (ref 6–20)
CALCIUM SERPL-MCNC: 9.1 MG/DL (ref 8.7–10.5)
CHLORIDE SERPL-SCNC: 97 MMOL/L (ref 95–110)
CO2 SERPL-SCNC: 20 MMOL/L (ref 23–29)
CREAT SERPL-MCNC: 5.6 MG/DL (ref 0.5–1.4)
EST. GFR  (NO RACE VARIABLE): 8.3 ML/MIN/1.73 M^2
GLUCOSE SERPL-MCNC: 185 MG/DL (ref 70–110)
PHOSPHATE SERPL-MCNC: 5.9 MG/DL (ref 2.7–4.5)
POCT GLUCOSE: 189 MG/DL (ref 70–110)
POCT GLUCOSE: 198 MG/DL (ref 70–110)
POTASSIUM SERPL-SCNC: 4.7 MMOL/L (ref 3.5–5.1)
SODIUM SERPL-SCNC: 132 MMOL/L (ref 136–145)

## 2024-06-03 PROCEDURE — 63600175 PHARM REV CODE 636 W HCPCS: Mod: HCNC | Performed by: NURSE PRACTITIONER

## 2024-06-03 PROCEDURE — 80069 RENAL FUNCTION PANEL: CPT | Mod: HCNC | Performed by: INTERNAL MEDICINE

## 2024-06-03 PROCEDURE — 25000003 PHARM REV CODE 250: Mod: HCNC

## 2024-06-03 PROCEDURE — 90935 HEMODIALYSIS ONE EVALUATION: CPT | Mod: HCNC,,, | Performed by: NURSE PRACTITIONER

## 2024-06-03 PROCEDURE — 36415 COLL VENOUS BLD VENIPUNCTURE: CPT | Mod: HCNC | Performed by: INTERNAL MEDICINE

## 2024-06-03 PROCEDURE — G0257 UNSCHED DIALYSIS ESRD PT HOS: HCPCS | Mod: HCNC

## 2024-06-03 PROCEDURE — 92507 TX SP LANG VOICE COMM INDIV: CPT | Mod: HCNC

## 2024-06-03 PROCEDURE — G0378 HOSPITAL OBSERVATION PER HR: HCPCS | Mod: HCNC

## 2024-06-03 RX ADMIN — CALCITRIOL 0.5 MCG: 0.5 CAPSULE, LIQUID FILLED ORAL at 02:06

## 2024-06-03 RX ADMIN — CLOPIDOGREL BISULFATE 75 MG: 75 TABLET ORAL at 02:06

## 2024-06-03 RX ADMIN — FAMOTIDINE 20 MG: 20 TABLET ORAL at 02:06

## 2024-06-03 RX ADMIN — ACETAMINOPHEN 650 MG: 325 TABLET ORAL at 02:06

## 2024-06-03 RX ADMIN — ATORVASTATIN CALCIUM 80 MG: 40 TABLET, FILM COATED ORAL at 02:06

## 2024-06-03 RX ADMIN — HYDRALAZINE HYDROCHLORIDE 50 MG: 50 TABLET ORAL at 02:06

## 2024-06-03 RX ADMIN — HEPARIN SODIUM 1000 UNITS: 1000 INJECTION, SOLUTION INTRAVENOUS; SUBCUTANEOUS at 12:06

## 2024-06-03 RX ADMIN — ACETAMINOPHEN 650 MG: 325 TABLET ORAL at 12:06

## 2024-06-03 NOTE — PROGRESS NOTES
[facility]FF HEMODIALYSIS NOTE     Patient currently on hemodialysis for removal of uremic toxins and volume.     Patient seen and evaluated on hemodialysis, tolerating treatment, see HD flowsheet for vitals and assessments.    Labs have been reviewed and the dialysate bath has been adjusted.       Assessment/Plan:    -Pending placement   -Patient seen on HD, tolerating treatment well, w/o complaints   -UF goal of 2L  -Renal diet, if not NPO   -Strict I/O's and daily weights  -Daily renal function panels  -Keep MAP >65 while on HD   -Hgb goal 10-11, at goal   Continue calcium acetate   Continue calcitriol   -Will continue to follow while inpatient     Katie Monique DNP-FNP, C  Nephrology  Pager: [phone]

## 2024-06-03 NOTE — NURSING
RN removed PIV and tele monitor. RN reviewed dc paperwork c pt and spouse @ BS. All questions, comments, concerns addressed. Pt bathed and dressed prior to dc. Pt dc'd home and wheeled out by , per husbands request. Upon dc VSS, JONATHAN Hines

## 2024-06-03 NOTE — PLAN OF CARE
[facility]      HOME HEALTH ORDERS  FACE TO FACE ENCOUNTER    Patient Name: Suyapa Connelly  YOB: 1965    PCP: Magen Christensen MD   PCP Address: [facility] [address]  PCP Phone Number: [phone]  PCP Fax: [phone]    Encounter Date: 5/28/24    Admit to Home Health    Diagnoses:  Active Hospital Problems    Diagnosis  POA    *Discharge planning issues [Z75.8]  Clinically Undetermined    Acute encephalopathy [G93.40]  Yes     Priority: 2     Severe obesity (BMI >= 40) [E66.01]  Yes     BMI 45      ESRD (end stage renal disease) [N18.6]  Yes    S/P AKA (above knee amputation) bilateral [Z89.611, Z89.612]  Not Applicable    Chronic combined systolic and diastolic heart failure [I50.42]  Yes    Paroxysmal atrial fibrillation [I48.0]  Yes    Anemia due to chronic kidney disease, on chronic dialysis [N18.6, D63.1, Z99.2]  Not Applicable    CAD (coronary artery disease) [I25.10]  Yes     Formatting of this note might be different from the original.  Last Assessment & Plan:   Formatting of this note might be different from the original.  -- Optimize glucose control.      Type 2 diabetes mellitus with hyperglycemia, with long-term current use of insulin [E11.65, Z79.4]  Not Applicable    CAROLIN (generalized anxiety disorder) [F41.1]  Yes      Resolved Hospital Problems    Diagnosis Date Resolved POA    Acute focal neurological deficit [R29.818] 05/30/2024 Unknown       Follow Up Appointments:  Future Appointments   Date Time Provider Department Center   6/7/2024  2:30 PM Geeta Cohen, APRN,ANP-C [facility] ENDOCRN [place]       Allergies:  Review of patient's allergies indicates:   Allergen Reactions    Ciprofloxacin Itching    Iodine      Kidney injury    Pcn [penicillins]      Rash; tolerated ceftriaxone on 1/13/20       Medications: Review discharge medications with patient and family and provide education.    Current Facility-Administered Medications    Medication Dose Route Frequency Provider Last Rate Last Admin    0.9%  NaCl infusion   Intravenous Once Katie Monique, CHRIS        acetaminophen tablet 650 mg  650 mg Oral Q6H PRN Gracy Pinzon PA-C   650 mg at 06/03/24 0024    albuterol-ipratropium 2.5 mg-0.5 mg/3 mL nebulizer solution 3 mL  3 mL Nebulization Q4H PRN Leticia Ley PA-C        allopurinol split tablet 50 mg  50 mg Oral Every other day Leticia Ley PA-C   50 mg at 06/01/24 0934    aluminum-magnesium hydroxide-simethicone 200-200-20 mg/5 mL suspension 30 mL  30 mL Oral QID PRN Leticia Ley PA-C        apixaban tablet 5 mg  5 mg Oral BID Leticia Ley PA-C   5 mg at 06/02/24 2044    atorvastatin tablet 80 mg  80 mg Oral Daily Leticia Ley PA-C   80 mg at 06/02/24 0855    bisacodyL suppository 10 mg  10 mg Rectal Daily PRN Leticia Ley PA-C        calcitRIOL capsule 0.5 mcg  0.5 mcg Oral Daily Leticia Ley PA-C   0.5 mcg at 06/02/24 0855    calcium acetate(phosphat bind) capsule 667 mg  667 mg Oral TID WM Leticia Ley PA-C   667 mg at 06/02/24 1715    carvediloL tablet 6.25 mg  6.25 mg Oral BID Leticia Ley PA-C   6.25 mg at 06/02/24 2044    clopidogreL tablet 75 mg  75 mg Oral Daily Leticia Ley PA-C   75 mg at 06/02/24 0855    dextrose 10% bolus 125 mL 125 mL  12.5 g Intravenous PRN Leticia Ley PA-C        dextrose 10% bolus 250 mL 250 mL  25 g Intravenous PRN Leticia Ley PA-C        famotidine tablet 20 mg  20 mg Oral Daily Leticia Ley PA-C   20 mg at 06/02/24 0855    glucagon (human recombinant) injection 1 mg  1 mg Intramuscular PRN Leticia Ley PA-C        glucose chewable tablet 16 g  16 g Oral LILIAN Leticia Ley PA-C        glucose chewable tablet 24 g  24 g Oral PRN Leticia Ley PA-C        heparin (porcine) injection 1,000 Units  1,000 Units Intra-Catheter PRN Katie Monique DNP   1,000 Units at 06/03/24 1233     hydrALAZINE tablet 50 mg  50 mg Oral TID Leticia Ley PA-C   50 mg at 06/02/24 2044    insulin aspart U-100 pen 0-5 Units  0-5 Units Subcutaneous QID (AC + HS) PRN Leticia Ley PA-C   1 Units at 06/02/24 2047    melatonin tablet 6 mg  6 mg Oral Nightly PRN Leticia Ley PA-C   6 mg at 06/01/24 2115    naloxone 0.4 mg/mL injection 0.02 mg  0.02 mg Intravenous PRN Leticia Ley PA-C        ondansetron disintegrating tablet 8 mg  8 mg Oral Q8H PRN Leticia Ley PA-C        polyethylene glycol packet 17 g  17 g Oral BID PRN Leticia Ley PA-C        prochlorperazine injection Soln 5 mg  5 mg Intravenous Q6H PRN Leticia Ley PA-C        sertraline tablet 100 mg  100 mg Oral Daily Leticia Ley PA-C   100 mg at 06/02/24 0855    simethicone chewable tablet 80 mg  1 tablet Oral QID PRN Leticia Ley PA-C        sodium chloride 0.9% flush 5 mL  5 mL Intravenous PRN Leticia Ley PA-C         Current Discharge Medication List        START taking these medications    Details   wound dressings (TRIAD WOUND DRESSING) Pste Apply 2 g topically once daily.  Qty: 170 g, Refills: 0           CONTINUE these medications which have NOT CHANGED    Details   acetaminophen (TYLENOL) 500 MG tablet Take 500 mg by mouth every 6 (six) hours as needed for Pain.      allopurinoL (ZYLOPRIM) 100 MG tablet Take 0.5 tablets (50 mg total) by mouth every other day.  Qty: 8 tablet, Refills: 11      apixaban (ELIQUIS) 5 mg Tab Take 1 tablet (5 mg total) by mouth 2 (two) times daily.  Qty: 60 tablet, Refills: 11      atorvastatin (LIPITOR) 80 MG tablet Take 1 tablet (80 mg total) by mouth once daily.  Qty: 90 tablet, Refills: 3    Associated Diagnoses: Coronary artery disease, unspecified vessel or lesion type, unspecified whether angina present, unspecified whether native or transplanted heart      blood sugar diagnostic Strp Use to check blood glucose 2 (two) times a day.  Qty: 100 strip,  "Refills: 0      calcitRIOL (ROCALTROL) 0.5 MCG Cap Take 1 capsule (0.5 mcg total) by mouth once daily.  Qty: 90 capsule, Refills: 3      calcium acetate,phosphat bind, (PHOSLO) 667 mg capsule Take 1 capsule (667 mg total) by mouth 3 (three) times daily with meals.  Qty: 90 capsule, Refills: 11      carvediloL (COREG) 6.25 MG tablet Take 1 tablet (6.25 mg total) by mouth 2 (two) times daily.  Qty: 180 tablet, Refills: 3    Comments: .      clopidogreL (PLAVIX) 75 mg tablet Take 1 tablet (75 mg total) by mouth once daily.  Qty: 90 tablet, Refills: 3    Associated Diagnoses: S/P CABG x 1      famotidine (PEPCID) 20 MG tablet Take 1 tablet (20 mg total) by mouth 2 (two) times daily.  Qty: 180 tablet, Refills: 3      hydrALAZINE (APRESOLINE) 50 MG tablet Take 1 tablet (50 mg total) by mouth 3 (three) times daily.  Qty: 270 tablet, Refills: 3    Comments: .      insulin aspart, niacinamide, (FIASP FLEXTOUCH U-100 INSULIN) 100 unit/mL (3 mL) InPn Inject 3 Units into the skin 3 (three) times daily with meals.      lancets [facility] To check BG 3 times daily, to use with insurance preferred meter  Qty: 100 each, Refills: 3      multivitamin (ONE DAILY MULTIVITAMIN) per tablet Take 1 tablet by mouth once daily.      pen needle, diabetic 32 gauge x 5/32" Ndle 1 Units by Misc.(Non-Drug; Combo Route) route before meals as needed (SSI).  Qty: 50 each, Refills: 3      sertraline (ZOLOFT) 100 MG tablet Take 1 tablet (100 mg total) by mouth once daily.  Qty: 90 tablet, Refills: 3    Associated Diagnoses: CAROLIN (generalized anxiety disorder)      sodium bicarbonate 650 MG tablet TAKE 1 TABLET(650 MG) BY MOUTH THREE TIMES DAILY  Qty: 90 tablet, Refills: 0      traZODone (DESYREL) 50 MG tablet Take 0.5 tablets (25 mg total) by mouth every evening.  Qty: 15 tablet, Refills: 11           STOP taking these medications       sevelamer carbonate (RENVELA) 800 mg Tab Comments:   Reason for Stopping:                 I have seen and examined this " patient within the last 30 days. My clinical findings that support the need for the home health skilled services and home bound status are the following:no   Weakness/numbness causing balance and gait disturbance due to Weakness/Debility making it taxing to leave home.     Diet:   renal diet    Diet recommendations:  Minced & Moist Diet - IDDSI Level 5, Liquid Diet Level: Thin liquids - IDDSI Level 0   Aspiration Precautions: Small bites/sips and Standard aspiration precautions     Labs:  Per agency    Referrals/ Consults  Physical Therapy to evaluate and treat. Evaluate for home safety and equipment needs; Establish/upgrade home exercise program. Perform / instruct on therapeutic exercises, gait training, transfer training, and Range of Motion.  Occupational Therapy to evaluate and treat. Evaluate home environment for safety and equipment needs. Perform/Instruct on transfers, ADL training, ROM, and therapeutic exercises.  Speech Therapy  to evaluate and treat for  Language and Cognition.   to evaluate for community resources/long-range planning.  Aide to provide assistance with personal care, ADLs, and vital signs.    Activities:   activity as tolerated    Nursing:   Agency to admit patient within 24 hours of hospital discharge unless specified on physician order or at patient request    SN to complete comprehensive assessment including routine vital signs. Instruct on disease process and s/s of complications to report to MD. Review/verify medication list sent home with the patient at time of discharge  and instruct patient/caregiver as needed. Frequency may be adjusted depending on start of care date.     Skilled nurse to perform up to 3 visits PRN for symptoms related to diagnosis    Notify MD if SBP > 160 or < 90; DBP > 90 or < 50; HR > 120 or < 50; Temp > 101; O2 < 88%    Ok to schedule additional visits based on staff availability and patient request on consecutive days within the home health  episode.    When multiple disciplines ordered:    Start of Care occurs on Sunday - Wednesday schedule remaining discipline evaluations as ordered on separate consecutive days following the start of care.    Thursday SOC -schedule subsequent evaluations Friday and Monday the following week.     Friday - Saturday SOC - schedule subsequent discipline evaluations on consecutive days starting Monday of the following week.    For all post-discharge communication and subsequent orders please contact patient's primary care physician. If unable to reach primary care physician or do not receive response within 30 minutes, please contact PCP for clinical staff order clarification    Miscellaneous   Routine Skin for Bedridden Patients: Instruct patient/caregiver to apply moisture barrier cream to all skin folds and wet areas in perineal area daily and after baths and all bowel movements.    Home Health Aide:  Nursing Three times weekly, Physical Therapy Three times weekly, Occupational Therapy Three times weekly, Speech Language Pathology Three times weekly, Medical Social Work Three times weekly, and Home Health Aide Three times weekly    Wound Care Orders    Miscellaneous Care: Routine Skin for Bedridden Patients:  Apply moisture barrier cream to all  Wound Care: RECOMMENDATIONS:     - sacrum, L upper buttocks   - cleanse gently w/ Vashe, pat dry and apply Triad to affected areas, leave MACKENZIE.  - BID/PRN   - Place pt on immerse surface.                                                                                                 I certify that this patient is confined to her home and needs intermittent skilled nursing care, physical therapy, speech therapy, and occupational therapy.      Aleta Rod PA-C  [facility]   [facility]

## 2024-06-03 NOTE — PLAN OF CARE
8:54 AM EDUARD attempted to contact [facility] to follow up on placement decision that was made over the weekend. Per PA, spouse called stating that he was brining patient to facility this morning. SW will continue to follow up.     9:03 AM EDUARD spoke to Thiago with the [facility] to complete LOCET. Pt already had a LOCET on file that was done on 5/10/24. SW followed protocol, awaiting 142. PASRR scanned to careport.     11:15 AM EDUARD spoke to Bea with [facility] who stated that she has no knowledge on pt admitting to their facility. Per Bea, pt will need a UDS to determine acceptance. Bea also stated that spouse has not completed admission documents and/or provided financials. EDUARD notified PA.     11:20  received and scanned into careport     3:03 PM SW who was accompanied by PA spoke to the pt's spouse at the bedside. Per spouse they will return home with resumed HH ([facility]) and continue with NH alf placement at home. Per spouse he did not have any needs for DC. EDUARD faxed updated HH orders to HH agency and notified them by phone. No other CM needs at this time.     Valery Grewal, MSW  [facility]  Ext. [phone]

## 2024-06-03 NOTE — NURSING
3.5 hours HD tx completed. 2 L of fluid removed.    Patient tolerated well.    Blood returned. Lines flushed with NS. Catheter locked with Heparin. Clamped, capped and wrapped with sterile gauze.    Report given to  DAYANNA Hines RN.

## 2024-06-03 NOTE — NURSING
Pt wheeled to dialysis via stretcher. Report given to dialysis RN prior. Pt aaox1- self, VSS, NAD.    Batsheva Hines

## 2024-06-03 NOTE — PLAN OF CARE
06/03/24 1510   Final Note   Assessment Type Final Discharge Note   Anticipated Discharge Disposition Home-Health   Hospital Resources/Appts/Education Provided Provided patient/caregiver with written discharge plan information   Post-Acute Status   Post-Acute Authorization Home Health   Post-Acute Placement Status Patient List Provided   Home Health Status Set-up Complete/Auth obtained   Patient choice form signed by patient/caregiver List with quality metrics by geographic area provided   Discharge Delays None known at this time     [facility]  Discharge Final Note    Primary Care Provider: Magen Christensen MD    Expected Discharge Date: 6/3/2024  Patient cleared for discharge from case management standpoint.     Discharge Plan A and Plan B have been determined by review of patient's clinical status, future medical and therapeutic needs, and coverage/benefits for post-acute care in coordination with multidisciplinary team members.        Final Discharge Note (most recent)       Final Note - 06/03/24 1510          Final Note    Assessment Type Final Discharge Note (P)      Anticipated Discharge Disposition Home-Health Care Svc (P)      Hospital Resources/Appts/Education Provided Provided patient/caregiver with written discharge plan information (P)         Post-Acute Status    Post-Acute Authorization Home Health (P)      Post-Acute Placement Status Patient List Provided (P)      Home Health Status Set-up Complete/Auth obtained (P)      Patient choice form signed by patient/caregiver List with quality metrics by geographic area provided (P)      Discharge Delays None known at this time (P)                      Important Message from Medicare             Contact Info       Magen Christensen MD   Specialty: Family Medicine   Relationship: PCP - General    [address] [facility] [address]   Phone: [phone]       Next Steps: Follow up    [facility] [address]   Specialty:  Neuropsychology    [address] [facility] [address]   Phone: [phone]       Next Steps: Follow up            Future Appointments   Date Time Provider Department Center   6/7/2024  2:30 PM Geeta Cohen APRN,ANP-C [facility] ENDOCRN [place]        scheduled post-discharge follow-up appointment and information added to AVS.     Valery Grewal, MSW  [facility]  Ext. [phone]

## 2024-06-03 NOTE — PLAN OF CARE
NURSING HOME ORDERS    06/03/2024  [facility]  [facility] - OBSERVATION 11H  [address]  [facility] [address]  Dept: [phone]  Loc: [phone]     Admit to Nursing Home:  Home-Health Care Svc    Diagnoses:  Active Hospital Problems    Diagnosis  POA    *Discharge planning issues [Z75.8]  Clinically Undetermined    Acute encephalopathy [G93.40]  Yes     Priority: 2     Severe obesity (BMI >= 40) [E66.01]  Yes     BMI 45      ESRD (end stage renal disease) [N18.6]  Yes    S/P AKA (above knee amputation) bilateral [Z89.611, Z89.612]  Not Applicable    Chronic combined systolic and diastolic heart failure [I50.42]  Yes    Paroxysmal atrial fibrillation [I48.0]  Yes    Anemia due to chronic kidney disease, on chronic dialysis [N18.6, D63.1, Z99.2]  Not Applicable    CAD (coronary artery disease) [I25.10]  Yes     Formatting of this note might be different from the original.  Last Assessment & Plan:   Formatting of this note might be different from the original.  -- Optimize glucose control.      Type 2 diabetes mellitus with hyperglycemia, with long-term current use of insulin [E11.65, Z79.4]  Not Applicable    CAROLIN (generalized anxiety disorder) [F41.1]  Yes      Resolved Hospital Problems    Diagnosis Date Resolved POA    Acute focal neurological deficit [R29.818] 05/30/2024 Unknown       Patient is homebound due to:  Discharge planning issues    Allergies:  Review of patient's allergies indicates:   Allergen Reactions    Ciprofloxacin Itching    Iodine      Kidney injury    Pcn [penicillins]      Rash; tolerated ceftriaxone on 1/13/20       Vitals:  Routine    Diet: renal diet    General Recommendations:  Dysphagia therapy, Speech/language therapy, and Cognitive-linguistic therapy  Diet recommendations:  Minced & Moist Diet - IDDSI Level 5, Thin liquids - IDDSI Level 0 Please avoid mixed consistencies (such as cereals, soups with large pieces of meat/vegetable, etc.), avoid  dry/coarse/crumbly items such as rice ,nuts, seeds, dried fruit, coconut, avoid fibrous foods, avoid tough skins, avoid tough vegetables (i.e. no corn, brussel sprouts,etc) avoid chewy/sticky foods (i.e. no peanut putter, caramel, licorice, etc.)    Aspiration Precautions: Strict 1:1 supervision with all PO required for safety, only when vital signs stable, only when awake/alert/attentive, only when accepting of PO, avoid straws, single bites/sips, 1 bite/sip at a time, Eliminate distractions, Frequent oral care, HOB to 90 degrees, Meds crushed in puree, Remain upright 30 minutes post meal, and Strict aspiration precautions. Continue to monitor for signs and symptoms of aspiration and discontinue oral feeding should you notice any of the following: watery eyes, reddened facial area, wet vocal quality, increased work of breathing, change in respiratory status, increased congestion, coughing, fever and/or change in level of alertness     Activities:   Activity as tolerated    Goals of Care Treatment Preferences:  Code Status: Full Code      Labs:  per facility    Nursing Precautions:  Aspiration , Fall, Seizure, and Pressure ulcer prevention    Consults:   PT to evaluate and treat- 5 times a week, OT to evaluate and treat- 5 times a week, ST to evaluate and treat- 5 times a week, Wound Care, and Nutrition to evaluate and recommend diet     Miscellaneous Care: Routine Skin for Bedridden Patients:  Apply moisture barrier cream to all  Wound Care: RECOMMENDATIONS:    - sacrum, L upper buttocks   - cleanse gently w/ Vashe, pat dry and apply Triad to affected areas, leave MACKENZIE.  - BID/PRN   - Place pt on immerse surface.           Medications: Discontinue all previous medication orders, if any. See new list below.     Medication List        START taking these medications      TRIAD WOUND DRESSING Pste  Generic drug: wound dressings  Apply 2 g topically once daily.            CONTINUE taking these medications      ACCU-CHEK  "SOFTCLIX LANCETS Misc  Generic drug: lancets  To check BG 3 times daily, to use with insurance preferred meter     acetaminophen 500 MG tablet  Commonly known as: TYLENOL  Take 500 mg by mouth every 6 (six) hours as needed for Pain.     allopurinoL 100 MG tablet  Commonly known as: ZYLOPRIM  Take 0.5 tablets (50 mg total) by mouth every other day.     apixaban 5 mg Tab  Commonly known as: ELIQUIS  Take 1 tablet (5 mg total) by mouth 2 (two) times daily.     atorvastatin 80 MG tablet  Commonly known as: LIPITOR  Take 1 tablet (80 mg total) by mouth once daily.     BD MIGUEL 2ND GEN PEN NEEDLE 32 gauge x 5/32" Ndle  Generic drug: pen needle, diabetic  1 Units by Misc.(Non-Drug; Combo Route) route before meals as needed (SSI).     calcitRIOL 0.5 MCG Cap  Commonly known as: ROCALTROL  Take 1 capsule (0.5 mcg total) by mouth once daily.     calcium acetate(phosphat bind) 667 mg capsule  Commonly known as: PHOSLO  Take 1 capsule (667 mg total) by mouth 3 (three) times daily with meals.     carvediloL 6.25 MG tablet  Commonly known as: COREG  Take 1 tablet (6.25 mg total) by mouth 2 (two) times daily.     clopidogreL 75 mg tablet  Commonly known as: PLAVIX  Take 1 tablet (75 mg total) by mouth once daily.     famotidine 20 MG tablet  Commonly known as: PEPCID  Take 1 tablet (20 mg total) by mouth 2 (two) times daily.     FIASP FLEXTOUCH U-100 INSULIN 100 unit/mL (3 mL) Inpn  Generic drug: insulin aspart (niacinamide)  Inject 3 Units into the skin 3 (three) times daily with meals.     hydrALAZINE 50 MG tablet  Commonly known as: APRESOLINE  Take 1 tablet (50 mg total) by mouth 3 (three) times daily.     ONE DAILY MULTIVITAMIN per tablet  Generic drug: multivitamin  Take 1 tablet by mouth once daily.     sertraline 100 MG tablet  Commonly known as: ZOLOFT  Take 1 tablet (100 mg total) by mouth once daily.     sodium bicarbonate 650 MG tablet  TAKE 1 TABLET(650 MG) BY MOUTH THREE TIMES DAILY     traZODone 50 MG tablet  Commonly " known as: DESYREL  Take 0.5 tablets (25 mg total) by mouth every evening.     TRUE METRIX GLUCOSE TEST STRIP Strp  Generic drug: blood sugar diagnostic  Use to check blood glucose 2 (two) times a day.            STOP taking these medications      sevelamer carbonate 800 mg Tab  Commonly known as: RENVELA                Immunizations Administered as of 6/3/2024       Name Date Dose VIS Date Route Exp Date    COVID-19, MRNA, LN-S, PF (Pfizer) (Purple Cap) 3/30/2021 11:24 AM 0.3 mL 12/12/2020 Intramuscular 7/31/2021    Site: Right deltoid     Given By: Margoth Alfredo     : [employer]     Lot: [id number]     COVID-19, MRNA, LN-S, PF (Pfizer) (Purple Cap) 3/9/2021  2:50 PM 0.3 mL 12/12/2020 Intramuscular 6/30/2021    Site: Right deltoid     Given By: Amira Berrios, RN     : [employer]     Lot: [id number]             This patient has had both covid vaccinations      _________________________________  Aleta Rod PA-C  06/03/2024

## 2024-06-03 NOTE — NURSING
Nurses Note -- 4 Eyes      6/3/2024   7:56 AM      Skin assessed during: Q Shift Change      [] No Altered Skin Integrity Present    []Prevention Measures Documented      [x] Yes- Altered Skin Integrity Present or Discovered   [x] LDA Added if Not in Epic (Describe Wound)   [x] New Altered Skin Integrity was Present on Admit and Documented in LDA   [x] Wound Image Taken    Wound Care Consulted? Yes    Attending Nurse:  Daysi Lin RN/Staff Member:   Sarah    No new wounds

## 2024-06-03 NOTE — PT/OT/SLP PROGRESS
Speech Language Pathology Treatment    Patient Name:  Suyapa Connelly   MRN:  [id number]  Admitting Diagnosis: Discharge planning issues    Recommendations:                 General Recommendations:  Dysphagia therapy  Diet recommendations:  Minced & Moist Diet - IDDSI Level 5, Liquid Diet Level: Thin liquids - IDDSI Level 0   Aspiration Precautions: Small bites/sips and Standard aspiration precautions   General Precautions: Standard, aspiration, aphasia, fall  Communication strategies:  none    Assessment:     Suyapa Connelly is a 58 y.o. female with an SLP diagnosis of Dysphagia and Cognitive-Linguistic Impairment.      Subjective     Awake yet lethargic    Pain/Comfort:  Pain Rating 1: 0/10    Respiratory Status: Room air    Objective:     Has the patient been evaluated by SLP for swallowing?   Yes  Keep patient NPO? No     Pt repositioned upright in bed for session. She was asleep upon entry but roused to voice. She declined PO trials this date. She answered yes/no questions via head nod intermittently throughout session. She was oriented to hospital via yes/no questions. SLP attempted further cognitive tasks; however pt made no response or did not engage with SLP. Transport came to bedside to pick pt up for dialysis. Continue POC at this time, all goals remain appropriate.     Goals:   Multidisciplinary Problems       SLP Goals          Problem: SLP    Goal Priority Disciplines Outcome   SLP Goal     SLP Progressing   Description: Speech Language Pathology Goals  Goals expected to be met by 6/5/24    1. Pt will participate in ongoing assessment of swallow function to determine safest, least restrictive means of nutrition/hydration  2. Educate Pt and family on aspiration precautions and SLP POC  3. Pt will follow simple commands with 60% accuracy or more, MAX A  4. Pt will answer personal yes/no questions 60% accuracy or higher, MAX A  5. Pt will participate in ongoing assessment of cognitive-linguistic skills  to determine ongoing POC needs  6. Educate Pt and family on safety awareness and compensatory strategies for communication                          Plan:     Patient to be seen:  4 x/week   Plan of Care expires:  06/28/24  Plan of Care reviewed with:  patient   SLP Follow-Up:  Yes       Discharge recommendations:    TBD      Time Tracking:     SLP Treatment Date:   06/03/24  Speech Start Time:  0834  Speech Stop Time:  0842     Speech Total Time (min):  8 min    Billable Minutes: Speech Therapy Individual 8    06/03/2024

## 2024-06-03 NOTE — PLAN OF CARE
Oriented to self. When  is not here, pt does not interact very much with others.  Beginning of shift, pt did not follow commands, nonverbal, moans when turning.  When  is here, pt is VERBAL and communicates well. Not oriented to month, year, situation, place. She smiles & jokes w/ .  Admit dx: AMS  Wound care today.   Afebrile. No acute events on shift. No deficits noted  IV access & IVF: PIV, saline locked  BM:   : incontinent  Appetite: only when  is here.   Bed alarm set; fall precautions maintained.   Bed locked in lowest position, side rails up x2, call light within reach, environment clear. Encouraged pt to call nurse with any concerns.  Safety measures maintained

## 2024-06-03 NOTE — PLAN OF CARE
EDUARD spoke to Thiago with the [facility] to complete LOCET. Pt already had a LOCET on file that was done on 5/10/24. EDUARD followed protocol, awaiting 142. PATRICIA scanned to [facility].     Valery Grewal, JENELLE  [facility]  Ext. [phone]

## 2024-06-03 NOTE — NURSING
Patient arrived in a stretcher to dialysis unit. Awake and alert.   Report received from DAYANNA Hines RN.      Hemodialysis initiated using the following:  Dialysis Access: Left IJ Tunneled Catheter.     UF goal 2 L as tolerated

## 2024-06-03 NOTE — NURSING
Nurses Note -- 4 Eyes      6/3/2024   10:50 AM      Skin assessed during: Q Shift Change      [] No Altered Skin Integrity Present    []Prevention Measures Documented      [x] Yes- Altered Skin Integrity Present or Discovered   [x] LDA Added if Not in Epic (Describe Wound)   [x] New Altered Skin Integrity was Present on Admit and Documented in LDA   [x] Wound Image Taken    Wound Care Consulted? Yes    Attending Nurse:  Batsheva Lin RN/Staff Member:   Priscilla

## 2024-06-04 NOTE — DISCHARGE SUMMARY
[facility] [address] [place] [address] Medicine  Discharge Summary      Patient Name: Suyapa Connelly  MRN: [id number]  CHAVEZ: [id number]  Patient Class: OP- Observation  Admission Date: 5/28/2024  Hospital Length of Stay: 0 days  Discharge Date and Time: 6/3/2024  3:50 PM  Attending Physician: No att. providers found   Discharging Provider: Aleta Rod PA-C  Primary Care Provider: Magen Christensen MD  [facility] Medicine Team: [facility] MED Y Aleta Rod PA-C  Primary Care Team: [facility] Y    HPI:   Suyapa Connelly is a 58 y.o. female with PMHx of b/l AKAs, ESRD on HD, CAD s/p CABG, afib on eliquis, HTN here with AMS.  at bedside assists with history. He reports patient was in her normal state of health until yesterday afternoon when they were driving and stopped at a gas station. She suddenly became rigid with her arms extending outwards and her head back with her eyes rolling backwards then suddenly relaxed and had a large bowel movement. Since this episode she has been confused and altered with abnormal speech and generalized weakness.  has never witnessed a similar episode. She skipped HD Monday, last full session Friday. History from patient limited. She follows some commands and is tearful ad reports she is afraid of being in the hospital, does not answer questions.    ED: HR in 90s and briefly hypertensive but otherwise HDS and afebrile. K 5.2. bicarb 20. Glucose 264. BUN 74. Cr 5.8. Albumin 1.1. T bili 1.1. . Hbg 11.3. She was a stroke code in the ED with NIHSS 5 and had CTA without acute intracranial process. Vascular neurology felt symptoms were stroke mimic/post ictal and recommended EEG/admission. She continue to be encephalopathic in ED and was admitted to .     * No surgery found *      Hospital Course:   Suyapa Connelly was placed in  observation for workup of encephalopathy. Patient continues to be confused and minimally engaged. She is able to follow  "commands with prompting. UA infectious, reflex culture pending. Start empiric rocephin - cultures did not isolate an organism in predominance. EEG obtained: "No epileptiform discharges, periodic discharges, lateralized rhythmic delta activity or electrographic seizures were seen. IMPRESSION: "Abnormal EEG due to a mild to moderate generalized cerebral dysfunction with no electrographic seizures or indications of seizure tendency."  MRI brain without acute changes, revealed "Overall degree of parenchymal volume loss has notably progressed when compared to the 05/06/2018 brain MRI. Neuropsychiatric consultation could be considered, as warranted clinically."    Medically ready to be discharged, discussed with  via phone call who initially wished to pursue NH placement. Discharge complicated as patient's family stopped answering the phone or all available numbers were out of service. APS report made per CM. Family came to bedside a few days later requesting to take the patient home - have long term plans to place her at [facility]. Per family patient's mental status had greatly improved since admission.       On day of discharge patient's vital signs were stable and patient appeared clinically ready for discharge. Hospital course, discharge plan and return precautions discussed - patient expressed understanding. All questions answered at that time.      Physical Exam  Vitals and nursing note reviewed.   Constitutional:       General: She is not in acute distress.     Appearance: She is well-developed. She is obese.   HENT:      Head: Normocephalic and atraumatic.   Cardiovascular:      Rate and Rhythm: Normal rate and regular rhythm.      Heart sounds: Normal heart sounds.   Pulmonary:      Effort: Pulmonary effort is normal. No respiratory distress.   Abdominal:      General: There is no distension.   Musculoskeletal:         General: No tenderness. Normal range of motion.      Comments: Bilateral AKA  "   Skin:     General: Skin is warm and dry.      Findings: No rash.   Neurological:      Mental Status: She is alert and oriented to person, place, and time.      Comments: Unable to assess orientation, minimally verbal on exam. Denies pain.   Psychiatric:         Behavior: Behavior normal.         Thought Content: Thought content normal.         Judgment: Judgment normal.      Goals of Care Treatment Preferences:  Code Status: Full Code      Consults:   Consults (From admission, onward)          Status Ordering Provider     Inpatient consult to Nephrology  Once        Provider:  (Not yet assigned)    Completed RADHA TRUJILLO     Inpatient consult to Vascular (Stroke) Neurology  Once        Provider:  (Not yet assigned)    Completed CARL THOMAS            No new Assessment & Plan notes have been filed under this hospital service since the last note was generated.  Service: Hospital Medicine    Final Active Diagnoses:    Diagnosis Date Noted POA    PRINCIPAL PROBLEM:  Discharge planning issues [Z75.8] 05/31/2024 Clinically Undetermined    Acute encephalopathy [G93.40] 05/05/2018 Yes    Severe obesity (BMI >= 40) [E66.01] 05/14/2024 Yes    ESRD (end stage renal disease) [N18.6] 04/21/2023 Yes    S/P AKA (above knee amputation) bilateral [Z89.611, Z89.612] 03/26/2022 Not Applicable    Chronic combined systolic and diastolic heart failure [I50.42] 06/21/2021 Yes    Paroxysmal atrial fibrillation [I48.0] 01/28/2020 Yes    Anemia due to chronic kidney disease, on chronic dialysis [N18.6, D63.1, Z99.2] 01/27/2020 Not Applicable    CAD (coronary artery disease) [I25.10] 01/02/2020 Yes    Type 2 diabetes mellitus with hyperglycemia, with long-term current use of insulin [E11.65, Z79.4] 01/02/2020 Not Applicable    CAROLIN (generalized anxiety disorder) [F41.1] 05/07/2018 Yes      Problems Resolved During this Admission:    Diagnosis Date Noted Date Resolved POA    Acute focal neurological deficit [R29.818] 05/29/2024  05/30/2024 Unknown       Discharged Condition: fair    Disposition: Home or Self Care    Follow Up:   Follow-up Information       Magen Christensen MD Follow up.    Specialty: Family Medicine  Contact information:  [address] [facility] [address]  [phone]               [facility] Neuro[facility] 6th Fl Follow up.    Specialty: Neuropsychology  Contact information:  [address] [facility] [address] [phone]  Additional information:  Main Building, Clinic 6th Floor                         Patient Instructions:      Ambulatory referral/consult to Outpatient Case Management   Referral Priority: Routine Referral Type: Consultation   Referral Reason: Specialty Services Required   Number of Visits Requested: 1     Ambulatory referral/consult to Adult Neuropsychology   Standing Status: Future   Referral Priority: Urgent Referral Type: Psychiatric   Referral Reason: Specialty Services Required   Number of Visits Requested: 1     Ambulatory referral/consult to HOME Palliative Care   Standing Status: Future   Referral Priority: Urgent Referral Type: Consultation   Requested Specialty: Hospice and Palliative Medicine   Number of Visits Requested: 1     Diet renal     Diet renal     Diet Dysphagia Mechanical Soft     Notify your health care provider if you experience any of the following:  temperature >100.4     Notify your health care provider if you experience any of the following:  persistent nausea and vomiting or diarrhea     Notify your health care provider if you experience any of the following:  redness, tenderness, or signs of infection (pain, swelling, redness, odor or green/yellow discharge around incision site)     Notify your health care provider if you experience any of the following:  difficulty breathing or increased cough     Notify your health care provider if you experience any of the following:  severe persistent headache     Notify your health care provider if you  experience any of the following:  worsening rash     Notify your health care provider if you experience any of the following:  persistent dizziness, light-headedness, or visual disturbances     Notify your health care provider if you experience any of the following:  increased confusion or weakness     Notify your health care provider if you experience any of the following:  temperature >100.4     Notify your health care provider if you experience any of the following:  persistent nausea and vomiting or diarrhea     Notify your health care provider if you experience any of the following:  severe uncontrolled pain     Notify your health care provider if you experience any of the following:  difficulty breathing or increased cough     Notify your health care provider if you experience any of the following:  severe persistent headache     Notify your health care provider if you experience any of the following:  worsening rash     Notify your health care provider if you experience any of the following:  persistent dizziness, light-headedness, or visual disturbances     Notify your health care provider if you experience any of the following:  increased confusion or weakness     Activity as tolerated     Activity as tolerated       Significant Diagnostic Studies:     Lab Results   Component Value Date    WBC 3.92 06/01/2024    HGB 10.8 (L) 06/01/2024    HCT 33.7 (L) 06/01/2024    MCV 97 06/01/2024     06/01/2024       BMP  Lab Results   Component Value Date     (L) 06/03/2024    K 4.7 06/03/2024    CL 97 06/03/2024    CO2 20 (L) 06/03/2024    BUN 53 (H) 06/03/2024    CREATININE 5.6 (H) 06/03/2024    CALCIUM 9.1 06/03/2024    ANIONGAP 15 06/03/2024    EGFRNORACEVR 8.3 (A) 06/03/2024     Imaging Results              CTA STROKE MULTI-PHASE (Final result)  Result time 05/28/24 22:11:43      Final result by Wan Pagan MD (05/28/24 22:11:43)                   Impression:      No acute intracranial process.   Presumed volume loss with enlargement of the ventricles similar to prior studies.    Extensive calcification but no significant stenosis at the carotid bifurcations with mild narrowing right greater than left.  Mild narrowing at the origin of vertebral arteries bilaterally.  Question element of fibromuscular dysplasia of the cervical ICA bilaterally.    Mild to moderate stenosis of the cavernous/supraclinoid ICA.  No major branch occlusion at the yryoyh-gr-Geanoq.      Electronically signed by: Wan Pagan  Date:    05/28/2024  Time:    22:11               Narrative:    EXAMINATION:  CTA STROKE MULTI-PHASE    CLINICAL HISTORY:  Neuro deficit, acute, stroke suspected;    TECHNIQUE:  Non contrast low dose axial images were obtained thought the head. CT angiogram was performed from the level of the mireya to the top of the head following the IV administration of 75mL of Omnipaque 350.   Sagittal and coronal reconstructions and maximum intensity projection reconstructions were performed. Arterial stenosis percentages are based on NASCET measurement criteria.  Two additional phases of immediate post-contrast CTA images were performed through the head alone.    3D reformats were created on an independent workstation to evaluate the ulrbod-ha-Ksrjsj.    COMPARISON:  05/07/2024    FINDINGS:  Widening of the ventricles presumably reflecting volume loss.    No evidence of intracranial hemorrhage mass effect or acute territorial infarct.  Decreased attenuation within the periventricular white matter is nonspecific but may reflect prominent chronic small vessel ischemic change versus other leukoencephalopathy.  Dystrophic calcification left frontal white matter again noted.  Small lipoma of the corpus callosum.    CTA:    No advanced stenosis at the origin of the vessels from the aortic arch.    Calcification with mild narrowing at the origin of the vertebral arteries from the subclavian arteries.    Marked calcification  "along the common carotid arteries carotid bifurcations and extending along the cervical ICA.  Mild narrowing by NASCET criteria at the carotid bifurcations right greater than left without significant stenosis bilaterally.  Question fibromuscular dysplasia of the cervical ICA bilaterally.    Intracranially there is mild to moderate narrowing of the cavernous/petrous ICA.  No major branch stenosis/occlusion is identified at the odmyis-ai-Mozorp.    The venous sinuses are patent.    No soft tissue mass is identified in the neck.    Ground-glass opacities at the lung mid lung zones bilaterally nonspecific.    These findings were communicated to Dr. Myles at 22:11 on 05/28/2024.                                      Pending Diagnostic Studies:       None           Medications:  Reconciled Home Medications:      Medication List        START taking these medications      TRIAD WOUND DRESSING Pste  Generic drug: wound dressings  Apply 2 g topically once daily.            CONTINUE taking these medications      ACCU-CHEK SOFTCLIX LANCETS Misc  Generic drug: lancets  To check BG 3 times daily, to use with insurance preferred meter     acetaminophen 500 MG tablet  Commonly known as: TYLENOL  Take 500 mg by mouth every 6 (six) hours as needed for Pain.     allopurinoL 100 MG tablet  Commonly known as: ZYLOPRIM  Take 0.5 tablets (50 mg total) by mouth every other day.     apixaban 5 mg Tab  Commonly known as: ELIQUIS  Take 1 tablet (5 mg total) by mouth 2 (two) times daily.     atorvastatin 80 MG tablet  Commonly known as: LIPITOR  Take 1 tablet (80 mg total) by mouth once daily.     BD MIGUEL 2ND GEN PEN NEEDLE 32 gauge x 5/32" Ndle  Generic drug: pen needle, diabetic  1 Units by Misc.(Non-Drug; Combo Route) route before meals as needed (SSI).     calcitRIOL 0.5 MCG Cap  Commonly known as: ROCALTROL  Take 1 capsule (0.5 mcg total) by mouth once daily.     calcium acetate(phosphat bind) 667 mg capsule  Commonly known as: " PHOSLO  Take 1 capsule (667 mg total) by mouth 3 (three) times daily with meals.     carvediloL 6.25 MG tablet  Commonly known as: COREG  Take 1 tablet (6.25 mg total) by mouth 2 (two) times daily.     clopidogreL 75 mg tablet  Commonly known as: PLAVIX  Take 1 tablet (75 mg total) by mouth once daily.     famotidine 20 MG tablet  Commonly known as: PEPCID  Take 1 tablet (20 mg total) by mouth 2 (two) times daily.     FIASP FLEXTOUCH U-100 INSULIN 100 unit/mL (3 mL) Inpn  Generic drug: insulin aspart (niacinamide)  Inject 3 Units into the skin 3 (three) times daily with meals.     hydrALAZINE 50 MG tablet  Commonly known as: APRESOLINE  Take 1 tablet (50 mg total) by mouth 3 (three) times daily.     ONE DAILY MULTIVITAMIN per tablet  Generic drug: multivitamin  Take 1 tablet by mouth once daily.     sertraline 100 MG tablet  Commonly known as: ZOLOFT  Take 1 tablet (100 mg total) by mouth once daily.     sodium bicarbonate 650 MG tablet  TAKE 1 TABLET(650 MG) BY MOUTH THREE TIMES DAILY     traZODone 50 MG tablet  Commonly known as: DESYREL  Take 0.5 tablets (25 mg total) by mouth every evening.     TRUE METRIX GLUCOSE TEST STRIP Strp  Generic drug: blood sugar diagnostic  Use to check blood glucose 2 (two) times a day.            STOP taking these medications      sevelamer carbonate 800 mg Tab  Commonly known as: RENVELA              Indwelling Lines/Drains at time of discharge:   Lines/Drains/Airways       Central Venous Catheter Line  Duration                  Hemodialysis Catheter 05/25/23 1336 left internal jugular 376 days                    Time spent on the discharge of patient: 36 minutes         Aleta Rod PA-C  Department of Hospital Medicine  [facility]H

## 2024-06-06 NOTE — PLAN OF CARE
06/06/2024   3:34 PM     SW received a call from Josephine with [facility] stating that they have tried to begin HH visits with the pt, but the spouse is refusing services. Per Josephine they will try once again, if refused, services will be canceled. No other CM needs at this time.     Valery Grewal, JENELLE  [facility]  Ext. [phone]

## 2024-06-11 ENCOUNTER — HOSPITAL ENCOUNTER (OUTPATIENT)
Facility: HOSPITAL | Age: 59
Discharge: HOME | End: 2024-06-13
Attending: STUDENT IN AN ORGANIZED HEALTH CARE EDUCATION/TRAINING PROGRAM | Admitting: HOSPITALIST
Payer: MEDICARE

## 2024-06-11 DIAGNOSIS — R41.82 ALTERED MENTAL STATUS: ICD-10-CM

## 2024-06-11 DIAGNOSIS — N18.6 ESRD (END STAGE RENAL DISEASE): ICD-10-CM

## 2024-06-11 DIAGNOSIS — E87.5 HYPERKALEMIA: ICD-10-CM

## 2024-06-11 DIAGNOSIS — S09.90XA CLOSED HEAD INJURY, INITIAL ENCOUNTER: Primary | ICD-10-CM

## 2024-06-11 DIAGNOSIS — R07.9 CHEST PAIN: ICD-10-CM

## 2024-06-11 DIAGNOSIS — F07.81 POSTCONCUSSIVE SYNDROME: ICD-10-CM

## 2024-06-11 LAB
ALLENS TEST: NORMAL
LDH SERPL L TO P-CCNC: 0.86 MMOL/L (ref 0.5–2.2)
SAMPLE: NORMAL
SITE: NORMAL

## 2024-06-11 PROCEDURE — 93005 ELECTROCARDIOGRAM TRACING: CPT | Mod: HCNC

## 2024-06-11 PROCEDURE — 85025 COMPLETE CBC W/AUTO DIFF WBC: CPT | Mod: HCNC | Performed by: EMERGENCY MEDICINE

## 2024-06-11 PROCEDURE — 99285 EMERGENCY DEPT VISIT HI MDM: CPT | Mod: 25,HCNC

## 2024-06-11 PROCEDURE — 99900035 HC TECH TIME PER 15 MIN (STAT): Mod: HCNC

## 2024-06-11 PROCEDURE — 83605 ASSAY OF LACTIC ACID: CPT | Mod: HCNC

## 2024-06-11 PROCEDURE — 93010 ELECTROCARDIOGRAM REPORT: CPT | Mod: HCNC,,, | Performed by: INTERNAL MEDICINE

## 2024-06-11 RX ORDER — ACETAMINOPHEN 500 MG
1000 TABLET ORAL
Status: ACTIVE | OUTPATIENT
Start: 2024-06-11 | End: 2024-06-12

## 2024-06-11 NOTE — Clinical Note
Diagnosis: Altered mental status [780.97.ICD-9-CM]   Future Attending Provider: ISREAL COLMENARES [[id number]]   Is the patient being sent to ED Observation?: No

## 2024-06-12 PROBLEM — F07.81 POSTCONCUSSIVE SYNDROME: Status: ACTIVE | Noted: 2024-06-12

## 2024-06-12 LAB
ALBUMIN SERPL BCP-MCNC: 2.9 G/DL (ref 3.5–5.2)
ALBUMIN SERPL BCP-MCNC: 3.1 G/DL (ref 3.5–5.2)
ALLENS TEST: ABNORMAL
ALP SERPL-CCNC: 155 U/L (ref 55–135)
ALT SERPL W/O P-5'-P-CCNC: 14 U/L (ref 10–44)
ANION GAP SERPL CALC-SCNC: 15 MMOL/L (ref 8–16)
ANION GAP SERPL CALC-SCNC: 16 MMOL/L (ref 8–16)
ANION GAP SERPL CALC-SCNC: 16 MMOL/L (ref 8–16)
ANISOCYTOSIS BLD QL SMEAR: SLIGHT
AST SERPL-CCNC: 25 U/L (ref 10–40)
BASOPHILS # BLD AUTO: 0.09 K/UL (ref 0–0.2)
BASOPHILS NFR BLD: 2 % (ref 0–1.9)
BILIRUB SERPL-MCNC: 1.3 MG/DL (ref 0.1–1)
BUN SERPL-MCNC: 39 MG/DL (ref 6–20)
BUN SERPL-MCNC: 79 MG/DL (ref 6–20)
BUN SERPL-MCNC: 80 MG/DL (ref 6–20)
BURR CELLS BLD QL SMEAR: ABNORMAL
CALCIUM SERPL-MCNC: 8.6 MG/DL (ref 8.7–10.5)
CALCIUM SERPL-MCNC: 8.8 MG/DL (ref 8.7–10.5)
CALCIUM SERPL-MCNC: 9.2 MG/DL (ref 8.7–10.5)
CHLORIDE SERPL-SCNC: 94 MMOL/L (ref 95–110)
CHLORIDE SERPL-SCNC: 95 MMOL/L (ref 95–110)
CHLORIDE SERPL-SCNC: 95 MMOL/L (ref 95–110)
CO2 SERPL-SCNC: 18 MMOL/L (ref 23–29)
CO2 SERPL-SCNC: 18 MMOL/L (ref 23–29)
CO2 SERPL-SCNC: 20 MMOL/L (ref 23–29)
CREAT SERPL-MCNC: 3.5 MG/DL (ref 0.5–1.4)
CREAT SERPL-MCNC: 5.8 MG/DL (ref 0.5–1.4)
CREAT SERPL-MCNC: 6 MG/DL (ref 0.5–1.4)
DELSYS: ABNORMAL
DIFFERENTIAL METHOD BLD: ABNORMAL
EOSINOPHIL # BLD AUTO: 0.1 K/UL (ref 0–0.5)
EOSINOPHIL NFR BLD: 2.2 % (ref 0–8)
ERYTHROCYTE [DISTWIDTH] IN BLOOD BY AUTOMATED COUNT: 17.7 % (ref 11.5–14.5)
EST. GFR  (NO RACE VARIABLE): 14.6 ML/MIN/1.73 M^2
EST. GFR  (NO RACE VARIABLE): 7.7 ML/MIN/1.73 M^2
EST. GFR  (NO RACE VARIABLE): 8 ML/MIN/1.73 M^2
GLUCOSE SERPL-MCNC: 115 MG/DL (ref 70–110)
GLUCOSE SERPL-MCNC: 150 MG/DL (ref 70–110)
GLUCOSE SERPL-MCNC: 89 MG/DL (ref 70–110)
HBV SURFACE AG SERPL QL IA: NORMAL
HCO3 UR-SCNC: 23.4 MMOL/L (ref 24–28)
HCT VFR BLD AUTO: 31.2 % (ref 37–48.5)
HGB BLD-MCNC: 10.5 G/DL (ref 12–16)
HYPOCHROMIA BLD QL SMEAR: ABNORMAL
IMM GRANULOCYTES # BLD AUTO: 0.01 K/UL (ref 0–0.04)
IMM GRANULOCYTES NFR BLD AUTO: 0.2 % (ref 0–0.5)
LYMPHOCYTES # BLD AUTO: 0.8 K/UL (ref 1–4.8)
LYMPHOCYTES NFR BLD: 17.6 % (ref 18–48)
MAGNESIUM SERPL-MCNC: 2 MG/DL (ref 1.6–2.6)
MCH RBC QN AUTO: 32.2 PG (ref 27–31)
MCHC RBC AUTO-ENTMCNC: 33.7 G/DL (ref 32–36)
MCV RBC AUTO: 96 FL (ref 82–98)
MODE: ABNORMAL
MONOCYTES # BLD AUTO: 0.8 K/UL (ref 0.3–1)
MONOCYTES NFR BLD: 17.8 % (ref 4–15)
NEUTROPHILS # BLD AUTO: 2.7 K/UL (ref 1.8–7.7)
NEUTROPHILS NFR BLD: 60.2 % (ref 38–73)
NRBC BLD-RTO: 0 /100 WBC
OHS QRS DURATION: 80 MS
OHS QRS DURATION: 82 MS
OHS QRS DURATION: 84 MS
OHS QTC CALCULATION: 460 MS
OHS QTC CALCULATION: 467 MS
OHS QTC CALCULATION: 479 MS
OVALOCYTES BLD QL SMEAR: ABNORMAL
PCO2 BLDA: 37.6 MMHG (ref 35–45)
PH SMN: 7.4 [PH] (ref 7.35–7.45)
PHOSPHATE SERPL-MCNC: 4.3 MG/DL (ref 2.7–4.5)
PHOSPHATE SERPL-MCNC: 6.9 MG/DL (ref 2.7–4.5)
PLATELET # BLD AUTO: 232 K/UL (ref 150–450)
PLATELET BLD QL SMEAR: ABNORMAL
PMV BLD AUTO: 11.6 FL (ref 9.2–12.9)
PO2 BLDA: 52 MMHG (ref 40–60)
POC BE: -1 MMOL/L
POC SATURATED O2: 87 % (ref 95–100)
POC TCO2: 25 MMOL/L (ref 24–29)
POCT GLUCOSE: 128 MG/DL (ref 70–110)
POCT GLUCOSE: 142 MG/DL (ref 70–110)
POCT GLUCOSE: 80 MG/DL (ref 70–110)
POCT GLUCOSE: 92 MG/DL (ref 70–110)
POIKILOCYTOSIS BLD QL SMEAR: SLIGHT
POLYCHROMASIA BLD QL SMEAR: ABNORMAL
POTASSIUM SERPL-SCNC: 4.5 MMOL/L (ref 3.5–5.1)
POTASSIUM SERPL-SCNC: 5.4 MMOL/L (ref 3.5–5.1)
POTASSIUM SERPL-SCNC: 5.8 MMOL/L (ref 3.5–5.1)
PROCALCITONIN SERPL IA-MCNC: 0.93 NG/ML
PROT SERPL-MCNC: 7.6 G/DL (ref 6–8.4)
RBC # BLD AUTO: 3.26 M/UL (ref 4–5.4)
SAMPLE: ABNORMAL
SITE: ABNORMAL
SODIUM SERPL-SCNC: 128 MMOL/L (ref 136–145)
SODIUM SERPL-SCNC: 129 MMOL/L (ref 136–145)
SODIUM SERPL-SCNC: 130 MMOL/L (ref 136–145)
TARGETS BLD QL SMEAR: ABNORMAL
TOXIC GRANULES BLD QL SMEAR: PRESENT
TROPONIN I SERPL DL<=0.01 NG/ML-MCNC: 0.07 NG/ML (ref 0–0.03)
WBC # BLD AUTO: 4.54 K/UL (ref 3.9–12.7)

## 2024-06-12 PROCEDURE — 87040 BLOOD CULTURE FOR BACTERIA: CPT | Mod: 59,HCNC

## 2024-06-12 PROCEDURE — 90935 HEMODIALYSIS ONE EVALUATION: CPT | Mod: HCNC,,, | Performed by: NURSE PRACTITIONER

## 2024-06-12 PROCEDURE — 82803 BLOOD GASES ANY COMBINATION: CPT | Mod: HCNC

## 2024-06-12 PROCEDURE — 99214 OFFICE O/P EST MOD 30 MIN: CPT | Mod: 25,HCNC,, | Performed by: NURSE PRACTITIONER

## 2024-06-12 PROCEDURE — 87340 HEPATITIS B SURFACE AG IA: CPT | Mod: HCNC | Performed by: NURSE PRACTITIONER

## 2024-06-12 PROCEDURE — G0378 HOSPITAL OBSERVATION PER HR: HCPCS | Mod: HCNC

## 2024-06-12 PROCEDURE — 25000003 PHARM REV CODE 250: Mod: HCNC | Performed by: NURSE PRACTITIONER

## 2024-06-12 PROCEDURE — 25000003 PHARM REV CODE 250: Mod: HCNC

## 2024-06-12 PROCEDURE — 36415 COLL VENOUS BLD VENIPUNCTURE: CPT | Mod: HCNC,XB | Performed by: HOSPITALIST

## 2024-06-12 PROCEDURE — 99900035 HC TECH TIME PER 15 MIN (STAT): Mod: HCNC

## 2024-06-12 PROCEDURE — 80069 RENAL FUNCTION PANEL: CPT | Mod: HCNC | Performed by: HOSPITALIST

## 2024-06-12 PROCEDURE — 63600175 PHARM REV CODE 636 W HCPCS: Mod: HCNC | Performed by: NURSE PRACTITIONER

## 2024-06-12 PROCEDURE — 80053 COMPREHEN METABOLIC PANEL: CPT | Mod: HCNC | Performed by: STUDENT IN AN ORGANIZED HEALTH CARE EDUCATION/TRAINING PROGRAM

## 2024-06-12 PROCEDURE — 80048 BASIC METABOLIC PNL TOTAL CA: CPT | Mod: HCNC,XB | Performed by: HOSPITALIST

## 2024-06-12 PROCEDURE — G0257 UNSCHED DIALYSIS ESRD PT HOS: HCPCS | Mod: HCNC

## 2024-06-12 PROCEDURE — 93005 ELECTROCARDIOGRAM TRACING: CPT | Mod: HCNC

## 2024-06-12 PROCEDURE — 93010 ELECTROCARDIOGRAM REPORT: CPT | Mod: HCNC,,, | Performed by: INTERNAL MEDICINE

## 2024-06-12 PROCEDURE — 36415 COLL VENOUS BLD VENIPUNCTURE: CPT | Mod: HCNC

## 2024-06-12 PROCEDURE — 84100 ASSAY OF PHOSPHORUS: CPT | Mod: HCNC | Performed by: STUDENT IN AN ORGANIZED HEALTH CARE EDUCATION/TRAINING PROGRAM

## 2024-06-12 PROCEDURE — 83735 ASSAY OF MAGNESIUM: CPT | Mod: HCNC | Performed by: STUDENT IN AN ORGANIZED HEALTH CARE EDUCATION/TRAINING PROGRAM

## 2024-06-12 PROCEDURE — 84484 ASSAY OF TROPONIN QUANT: CPT | Mod: HCNC

## 2024-06-12 PROCEDURE — 84145 PROCALCITONIN (PCT): CPT | Mod: HCNC

## 2024-06-12 RX ORDER — ATORVASTATIN CALCIUM 40 MG/1
80 TABLET, FILM COATED ORAL DAILY
Status: DISCONTINUED | OUTPATIENT
Start: 2024-06-12 | End: 2024-06-14 | Stop reason: HOSPADM

## 2024-06-12 RX ORDER — TALC
6 POWDER (GRAM) TOPICAL NIGHTLY PRN
Status: DISCONTINUED | OUTPATIENT
Start: 2024-06-12 | End: 2024-06-14 | Stop reason: HOSPADM

## 2024-06-12 RX ORDER — IBUPROFEN 200 MG
16 TABLET ORAL
Status: DISCONTINUED | OUTPATIENT
Start: 2024-06-12 | End: 2024-06-14 | Stop reason: HOSPADM

## 2024-06-12 RX ORDER — CLOPIDOGREL BISULFATE 75 MG/1
75 TABLET ORAL DAILY
Status: DISCONTINUED | OUTPATIENT
Start: 2024-06-12 | End: 2024-06-14 | Stop reason: HOSPADM

## 2024-06-12 RX ORDER — GLUCAGON 1 MG
1 KIT INJECTION
Status: DISCONTINUED | OUTPATIENT
Start: 2024-06-12 | End: 2024-06-14 | Stop reason: HOSPADM

## 2024-06-12 RX ORDER — HYDRALAZINE HYDROCHLORIDE 50 MG/1
50 TABLET, FILM COATED ORAL 3 TIMES DAILY PRN
Status: DISCONTINUED | OUTPATIENT
Start: 2024-06-12 | End: 2024-06-14 | Stop reason: HOSPADM

## 2024-06-12 RX ORDER — CALCITRIOL 0.5 UG/1
0.5 CAPSULE ORAL DAILY
Status: DISCONTINUED | OUTPATIENT
Start: 2024-06-12 | End: 2024-06-14 | Stop reason: HOSPADM

## 2024-06-12 RX ORDER — HEPARIN SODIUM 1000 [USP'U]/ML
1000 INJECTION, SOLUTION INTRAVENOUS; SUBCUTANEOUS
Status: DISCONTINUED | OUTPATIENT
Start: 2024-06-12 | End: 2024-06-14 | Stop reason: HOSPADM

## 2024-06-12 RX ORDER — SODIUM CHLORIDE 0.9 % (FLUSH) 0.9 %
5 SYRINGE (ML) INJECTION
Status: DISCONTINUED | OUTPATIENT
Start: 2024-06-12 | End: 2024-06-14 | Stop reason: HOSPADM

## 2024-06-12 RX ORDER — SODIUM CHLORIDE 9 MG/ML
INJECTION, SOLUTION INTRAVENOUS ONCE
Status: COMPLETED | OUTPATIENT
Start: 2024-06-12 | End: 2024-06-12

## 2024-06-12 RX ORDER — CARVEDILOL 6.25 MG/1
6.25 TABLET ORAL 2 TIMES DAILY
Status: DISCONTINUED | OUTPATIENT
Start: 2024-06-12 | End: 2024-06-12

## 2024-06-12 RX ORDER — BISACODYL 10 MG/1
10 SUPPOSITORY RECTAL DAILY PRN
Status: DISCONTINUED | OUTPATIENT
Start: 2024-06-12 | End: 2024-06-14 | Stop reason: HOSPADM

## 2024-06-12 RX ORDER — SERTRALINE HYDROCHLORIDE 100 MG/1
100 TABLET, FILM COATED ORAL DAILY
Status: DISCONTINUED | OUTPATIENT
Start: 2024-06-13 | End: 2024-06-14 | Stop reason: HOSPADM

## 2024-06-12 RX ORDER — NALOXONE HCL 0.4 MG/ML
0.02 VIAL (ML) INJECTION
Status: DISCONTINUED | OUTPATIENT
Start: 2024-06-12 | End: 2024-06-14 | Stop reason: HOSPADM

## 2024-06-12 RX ORDER — INSULIN ASPART 100 [IU]/ML
0-5 INJECTION, SOLUTION INTRAVENOUS; SUBCUTANEOUS
Status: DISCONTINUED | OUTPATIENT
Start: 2024-06-12 | End: 2024-06-14 | Stop reason: HOSPADM

## 2024-06-12 RX ORDER — SODIUM BICARBONATE 650 MG/1
650 TABLET ORAL 3 TIMES DAILY
Status: DISCONTINUED | OUTPATIENT
Start: 2024-06-12 | End: 2024-06-14 | Stop reason: HOSPADM

## 2024-06-12 RX ORDER — FAMOTIDINE 20 MG/1
20 TABLET, FILM COATED ORAL DAILY
Status: DISCONTINUED | OUTPATIENT
Start: 2024-06-12 | End: 2024-06-14 | Stop reason: HOSPADM

## 2024-06-12 RX ORDER — CALCIUM ACETATE 667 MG/1
667 CAPSULE ORAL
Status: DISCONTINUED | OUTPATIENT
Start: 2024-06-12 | End: 2024-06-14 | Stop reason: HOSPADM

## 2024-06-12 RX ORDER — IBUPROFEN 200 MG
24 TABLET ORAL
Status: DISCONTINUED | OUTPATIENT
Start: 2024-06-12 | End: 2024-06-14 | Stop reason: HOSPADM

## 2024-06-12 RX ORDER — IPRATROPIUM BROMIDE AND ALBUTEROL SULFATE 2.5; .5 MG/3ML; MG/3ML
3 SOLUTION RESPIRATORY (INHALATION) EVERY 4 HOURS PRN
Status: DISCONTINUED | OUTPATIENT
Start: 2024-06-12 | End: 2024-06-14 | Stop reason: HOSPADM

## 2024-06-12 RX ADMIN — SODIUM CHLORIDE: 9 INJECTION, SOLUTION INTRAVENOUS at 03:06

## 2024-06-12 RX ADMIN — ATORVASTATIN CALCIUM 80 MG: 40 TABLET, FILM COATED ORAL at 10:06

## 2024-06-12 RX ADMIN — CLOPIDOGREL BISULFATE 75 MG: 75 TABLET ORAL at 10:06

## 2024-06-12 RX ADMIN — SODIUM BICARBONATE 650 MG TABLET 650 MG: at 10:06

## 2024-06-12 RX ADMIN — APIXABAN 5 MG: 5 TABLET, FILM COATED ORAL at 08:06

## 2024-06-12 RX ADMIN — CALCITRIOL 0.5 MCG: 0.5 CAPSULE, LIQUID FILLED ORAL at 10:06

## 2024-06-12 RX ADMIN — CALCIUM ACETATE 667 MG: 667 CAPSULE ORAL at 08:06

## 2024-06-12 RX ADMIN — SODIUM BICARBONATE 650 MG TABLET 650 MG: at 08:06

## 2024-06-12 RX ADMIN — APIXABAN 5 MG: 5 TABLET, FILM COATED ORAL at 10:06

## 2024-06-12 RX ADMIN — HEPARIN SODIUM 1000 UNITS: 1000 INJECTION, SOLUTION INTRAVENOUS; SUBCUTANEOUS at 06:06

## 2024-06-12 RX ADMIN — FAMOTIDINE 20 MG: 20 TABLET ORAL at 10:06

## 2024-06-12 NOTE — ED TRIAGE NOTES
Suyapa Connelly, an 58 y.o. female presents to the ED with c/o fall at home.  reports pt was sitting at the table and fell forward and hit her face, causing her nose to bleed and +LOC. Pt is oriented to self at this time. Dialysis pt, MWF, per , pt has not missed any sessions.      Chief Complaint   Patient presents with    Headache     Pt states she has a headache, Hx of altered mental status and encephalopathy, recently admitted for same. Oriented to self at this time. Per , Pt fell out of wheelchair tonight after bath, +head trauma, +LOC.  states her nose starting bleeding, no bleeding at this time. Dialysis Mon, Wed, Fri.      Review of patient's allergies indicates:   Allergen Reactions    Ciprofloxacin Itching    Iodine      Kidney injury    Pcn [penicillins]      Rash; tolerated ceftriaxone on 1/13/20     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    Osteomyelitis     Osteomyelitis of left foot 04/30/2021    Ulcer of left foot     Vaginal delivery     x1

## 2024-06-12 NOTE — SUBJECTIVE & OBJECTIVE
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    Osteomyelitis     Osteomyelitis of left foot 04/30/2021    Ulcer of left foot     Vaginal delivery     x1       Past Surgical History:   Procedure Laterality Date    ABOVE-KNEE AMPUTATION Left 5/18/2021    Procedure: AMPUTATION, ABOVE KNEE;  Surgeon: Teddy Huber MD;  Location: [facility] OR [address] [place] [address];  Service: Vascular;  Laterality: Left;    ABOVE-KNEE AMPUTATION Right 3/18/2022    Procedure: AMPUTATION, ABOVE KNEE;  Surgeon: DAYNE Florez II, MD;  Location: [facility] OR [address] [place] [address];  Service: Vascular;  Laterality: Right;    Angiogram - Right Extremity Right 7/9/15    angiogram-left leg  10/6/15    ANGIOGRAPHY OF LOWER EXTREMITY Left 4/29/2021    Procedure: ANGIOGRAM, LOWER EXTREMITY;  Surgeon: Teddy Huber MD;  Location: [facility] OR [address] [place] [address];  Service: Vascular;  Laterality: Left;    BELOW KNEE AMPUTATION OF LOWER EXTREMITY Right 12/28/2021    Procedure: AMPUTATION, BELOW KNEE;  Surgeon: Kaitlyn Rojas MD;  Location: [facility] OR;  Service: General;  Laterality: Right;    CATHETERIZATION OF BOTH LEFT AND RIGHT HEART N/A 12/18/2019    Procedure: CATHETERIZATION, HEART, BOTH LEFT AND RIGHT;  Surgeon: Que Fernando III, MD;  Location: [facility] CATH LAB;  Service: Cardiology;  Laterality: N/A;    CORONARY ANGIOGRAPHY N/A 12/18/2019    Procedure: ANGIOGRAM, CORONARY ARTERY;  Surgeon: Que Fernando III, MD;  Location: [facility] CATH LAB;  Service: Cardiology;  Laterality: N/A;    CORONARY ANGIOGRAPHY INCLUDING BYPASS GRAFTS WITH CATHETERIZATION OF LEFT HEART N/A 7/28/2020    Procedure: ANGIOGRAM, CORONARY, INCLUDING BYPASS GRAFT, WITH LEFT HEART CATHETERIZATION, 9 am;  Surgeon: Rachel Easley MD;   Location: [facility] CATH LAB;  Service: Cardiology;  Laterality: N/A;    CORONARY ARTERY BYPASS GRAFT (CABG) N/A 1/14/2020    Procedure: CORONARY ARTERY BYPASS GRAFT (CABG) x 1     Off Pump;  Surgeon: Huang Altamirano MD;  Location: [facility] OR [address] [place] [address];  Service: Cardiovascular;  Laterality: N/A;    CREATION OF FEMORAL-TIBIAL ARTERY BYPASS Left 4/29/2021    Procedure: CREATION, BYPASS, ARTERIAL, FEMORAL TO ANTERIOR TIBIAL;  Surgeon: Teddy Huber MD;  Location: [facility] OR [address] [place] [address];  Service: Vascular;  Laterality: Left;    CREATION OF FEMOROPOPLITEAL ARTERIAL BYPASS USING GRAFT Left 8/18/2020    Procedure: CREATION, BYPASS, ARTERIAL, FEMORAL TO POPLITEAL, USING GRAFT, LEFT LOWER EXTREMITY;  Surgeon: Teddy Huber MD;  Location: [facility];  Service: Vascular;  Laterality: Left;  REQUEST 7:15 A.M. START----COVID NEGATIVE ON 8/17 1ST CASE STARTE PER LEANA ON 8/7/2020 @ 942AM-  RN PREOP 8/12/2020   T/S-----CLEARED BY CARDS-------PENDING INSURANCE    DEBRIDEMENT OF FOOT Left 9/8/2020    Procedure: DEBRIDEMENT, FOOT;  Surgeon: Rosio Mayes DPM;  Location: [facility];  Service: Podiatry;  Laterality: Left;  request neoxx .   RN Pre Op 9-4-2020, Covid negative on 9/5/20. C A    DEBRIDEMENT OF FOOT  3/4/2021    Procedure: DEBRIDEMENT, FOOT;  Surgeon: Teddy Huber MD;  Location: [facility] OR;  Service: Vascular;;    DEBRIDEMENT OF FOOT Left 3/9/2021    Procedure: DEBRIDEMENT, FOOT, bone biopsy;  Surgeon: Rosio Mayes DPM;  Location: [facility] OR;  Service: Podiatry;  Laterality: Left;  Request neoxx---COVID IN AM  REQUESTING NOON START  RN Phone Pre op.On Blood thinners Plavix and Eliquis.  Covid am of surgery. C A    DEBRIDEMENT OF FOOT Left 5/4/2021    Procedure: DEBRIDEMENT, FOOT;  Surgeon: Farooq Morley DPM;  Location: [address];  Service: Podiatry;  Laterality: Left;    INSERTION OF TUNNELED CENTRAL VENOUS HEMODIALYSIS CATHETER N/A 1/27/2020    Procedure: Insertion, Catheter, Central Venous,  Hemodialysis;  Surgeon: ESTEBAN Gomez III, MD;  Location: [facility] CATH LAB;  Service: Peripheral Vascular;  Laterality: N/A;    INSERTION OF TUNNELED CENTRAL VENOUS HEMODIALYSIS CATHETER  5/10/2023    Procedure: Insertion, Catheter, Central Venous, Hemodialysis;  Surgeon: Romulo Queen MD;  Location: [facility] CATH LAB;  Service: Cardiology;;    PERCUTANEOUS TRANSLUMINAL ANGIOPLASTY N/A 3/4/2021    Procedure: PTA (ANGIOPLASTY, PERCUTANEOUS, TRANSLUMINAL);  Surgeon: Teddy Huber MD;  Location: [facility] OR;  Service: Vascular;  Laterality: N/A;    REMOVAL OF ARTERIOVENOUS GRAFT Left 5/27/2021    Procedure: REMOVAL, GRAFT, LEFT LOWER EXTREMITY, WOUND EXPLORATION;  Surgeon: Teddy Huber MD;  Location: [facility] OR 2ND FLR;  Service: Vascular;  Laterality: Left;    REMOVAL OF NAIL OF DIGIT Left 3/9/2021    Procedure: REMOVAL, NAIL, DIGIT;  Surgeon: Rosio Mayes DPM;  Location: [facility] OR;  Service: Podiatry;  Laterality: Left;    RIGHT HEART CATHETERIZATION Right 5/10/2023    Procedure: INSERTION, CATHETER, RIGHT HEART;  Surgeon: Romulo Queen MD;  Location: [facility] CATH LAB;  Service: Cardiology;  Laterality: Right;    THROMBECTOMY Left 3/4/2021    Procedure: THROMBECTOMY, LEFT LOWER EXTREMITY BYPASS GRAFT, ANGIOGRAM, POSSIBLE INTERVENTION, POSSIBLE LEFT LOWER EXTREMITY BYPASS;  Surgeon: Teddy Huber MD;  Location: [facility] OR;  Service: Vascular;  Laterality: Left;    THROMBECTOMY Left 4/29/2021    Procedure: GRAFT THROMBECTOMY, LEFT LOWER EXTREMITY;  Surgeon: Teddy Huber MD;  Location: [facility] OR 2ND FLR;  Service: Vascular;  Laterality: Left;  14.5 min  1179.85 mGy  341.01 Gycm2  240 ml dye    THROMBECTOMY  10/22/2021    Procedure: THROMBECTOMY;  Surgeon: Saad Arenas MD;  Location: [facility] CATH LAB/EP;  Service: Cardiology;;       Review of patient's allergies indicates:   Allergen Reactions    Ciprofloxacin Itching    Iodine      Kidney injury    Pcn [penicillins]      Rash; tolerated ceftriaxone  "on 1/13/20       No current facility-administered medications on file prior to encounter.     Current Outpatient Medications on File Prior to Encounter   Medication Sig    acetaminophen (TYLENOL) 500 MG tablet Take 500 mg by mouth every 6 (six) hours as needed for Pain.    allopurinoL (ZYLOPRIM) 100 MG tablet Take 0.5 tablets (50 mg total) by mouth every other day.    apixaban (ELIQUIS) 5 mg Tab Take 1 tablet (5 mg total) by mouth 2 (two) times daily.    atorvastatin (LIPITOR) 80 MG tablet Take 1 tablet (80 mg total) by mouth once daily.    blood sugar diagnostic Strp Use to check blood glucose 2 (two) times a day.    calcitRIOL (ROCALTROL) 0.5 MCG Cap Take 1 capsule (0.5 mcg total) by mouth once daily.    calcium acetate,phosphat bind, (PHOSLO) 667 mg capsule Take 1 capsule (667 mg total) by mouth 3 (three) times daily with meals.    carvediloL (COREG) 6.25 MG tablet Take 1 tablet (6.25 mg total) by mouth 2 (two) times daily.    clopidogreL (PLAVIX) 75 mg tablet Take 1 tablet (75 mg total) by mouth once daily.    famotidine (PEPCID) 20 MG tablet Take 1 tablet (20 mg total) by mouth 2 (two) times daily.    hydrALAZINE (APRESOLINE) 50 MG tablet Take 1 tablet (50 mg total) by mouth 3 (three) times daily.    insulin aspart, niacinamide, (FIASP FLEXTOUCH U-100 INSULIN) 100 unit/mL (3 mL) InPn Inject 3 Units into the skin 3 (three) times daily with meals.    lancets Misc To check BG 3 times daily, to use with insurance preferred meter    multivitamin (ONE DAILY MULTIVITAMIN) per tablet Take 1 tablet by mouth once daily.    pen needle, diabetic 32 gauge x 5/32" Ndle 1 Units by Misc.(Non-Drug; Combo Route) route before meals as needed (SSI).    sertraline (ZOLOFT) 100 MG tablet Take 1 tablet (100 mg total) by mouth once daily.    sodium bicarbonate 650 MG tablet TAKE 1 TABLET(650 MG) BY MOUTH THREE TIMES DAILY    traZODone (DESYREL) 50 MG tablet Take 0.5 tablets (25 mg total) by mouth every evening.    wound dressings " (TRIAD WOUND DRESSING) Pste Apply 2 g topically once daily.    [DISCONTINUED] lancing device Misc 1 Device by Misc.(Non-Drug; Combo Route) route 2 (two) times daily with meals.     Family History       Problem Relation (Age of Onset)    Diabetes Mother, Father, Paternal Grandmother    Heart disease Maternal Grandmother    No Known Problems Maternal Grandfather, Paternal Grandfather          Tobacco Use    Smoking status: Former    Smokeless tobacco: Never   Substance and Sexual Activity    Alcohol use: No    Drug use: Yes     Types: Marijuana     Comment: occassional    Sexual activity: Yes     Partners: Male     Review of Systems   Unable to perform ROS: Mental status change     Objective:     Vital Signs (Most Recent):  Temp: 97.6 °F (36.4 °C) (06/12/24 0608)  Pulse: 81 (06/12/24 0608)  Resp: 18 (06/12/24 0608)  BP: (!) 104/53 (06/12/24 0608)  SpO2: (!) 94 % (06/12/24 0608) Vital Signs (24h Range):  Temp:  [97.6 °F (36.4 °C)-98.2 °F (36.8 °C)] 97.6 °F (36.4 °C)  Pulse:  [80-83] 81  Resp:  [10-18] 18  SpO2:  [94 %-100 %] 94 %  BP: (104-120)/(53-71) 104/53     Weight: 74.5 kg (164 lb 3.9 oz)  Body mass index is 50.12 kg/m².     Physical Exam  Vitals and nursing note reviewed.   Constitutional:       General: She is not in acute distress.     Appearance: She is obese.      Comments: Awake and alert but disoriented   HENT:      Head: Normocephalic and atraumatic.      Nose: Nose normal.      Mouth/Throat:      Pharynx: No oropharyngeal exudate.   Eyes:      Extraocular Movements: Extraocular movements intact.      Conjunctiva/sclera: Conjunctivae normal.   Cardiovascular:      Rate and Rhythm: Normal rate and regular rhythm.      Heart sounds: Normal heart sounds.   Pulmonary:      Effort: Pulmonary effort is normal. No respiratory distress.   Abdominal:      General: Bowel sounds are normal. There is no distension.      Palpations: Abdomen is soft.      Tenderness: There is no abdominal tenderness.   Musculoskeletal:          General: Normal range of motion.      Cervical back: Normal range of motion.      Comments: BL AKA. Non focally TTP. Moves spontaneously BL   Skin:     General: Skin is warm and dry.   Neurological:      Mental Status: She is alert.      Comments: Unable to answer questions. Follows commands. GOODRICH spontaneously.    Psychiatric:         Mood and Affect: Mood normal.         Behavior: Behavior normal.                Significant Labs: All pertinent labs within the past 24 hours have been reviewed.  CBC:   Recent Labs   Lab 06/11/24  2240   WBC 4.54   HGB 10.5*   HCT 31.2*        CMP:   Recent Labs   Lab 06/12/24  0147   *   K 5.4*   CL 95   CO2 18*   GLU 89   BUN 79*   CREATININE 5.8*   CALCIUM 8.6*   PROT 7.6   ALBUMIN 2.9*   BILITOT 1.3*   ALKPHOS 155*   AST 25   ALT 14   ANIONGAP 15     Magnesium:   Recent Labs   Lab 06/12/24  0147   MG 2.0     TSH:   Recent Labs   Lab 05/28/24  2149   TSH 3.133       Significant Imaging: I have reviewed all pertinent imaging results/findings within the past 24 hours.  X-Ray Hip 2 or 3 views Right with Pelvis when performed  Narrative: EXAMINATION:  XR HIP WITH PELVIS WHEN PERFORMED 2 OR 3 VIEWS RIGHT    CLINICAL HISTORY:  R hip pain;    TECHNIQUE:  AP view of the pelvis and frog leg lateral view of the right hip were performed.    COMPARISON:  CT of the abdomen and pelvis from 05/23/2023.    FINDINGS:  There is diffuse osteopenia.  There is no evidence of an acute fracture or dislocation of the pelvis or the right hip.  The femoral heads are well seated in the acetabula.  There are surgical clips overlying the left hip.  There are vascular calcifications.  There are multiple calcifications overlying the pelvis which may reflect uterine fibroids.  Impression: No acute fracture or dislocation.    Electronically signed by: Rizwan Valdez  Date:    06/12/2024  Time:    01:05  X-Ray Chest AP Portable  Narrative: EXAMINATION:  XR CHEST AP PORTABLE    CLINICAL  HISTORY:  Altered mental status, unspecified    TECHNIQUE:  Single frontal view of the chest was performed.    COMPARISON:  Chest radiograph May 7, 2024    FINDINGS:  Single portable chest radiograph is submitted.  Postoperative cardiothoracic change and left-sided central venous catheter again noted.  ET tube and enteric tube have been removed in the interim, additional objects overlie the patient.    The heart size appears enlarged however the appearance of the heart size and cardiomediastinal silhouette appears stable when accounting for position and technique and depth of inspiration.  Aortic atherosclerotic change noted.    There is central pulmonary vascular prominence.  Bilateral pattern of accentuation of interstitial markings may relate to a pattern of interstitial infiltrate/edema.  Suspected mild patchy alveolar infiltrates particularly about the left perihilar region and at the lung bases bilaterally.    There is no evidence for significant pleural effusion and no evidence for pneumothorax.    The osseous structures demonstrate chronic change.  Impression: Radiographic findings as above.    Electronically signed by: Kp Oliveira  Date:    06/12/2024  Time:    00:42

## 2024-06-12 NOTE — SUBJECTIVE & OBJECTIVE
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    Osteomyelitis     Osteomyelitis of left foot 04/30/2021    Ulcer of left foot     Vaginal delivery     x1       Past Surgical History:   Procedure Laterality Date    ABOVE-KNEE AMPUTATION Left 5/18/2021    Procedure: AMPUTATION, ABOVE KNEE;  Surgeon: Teddy Huber MD;  Location: [facility] OR [address] [place] [address];  Service: Vascular;  Laterality: Left;    ABOVE-KNEE AMPUTATION Right 3/18/2022    Procedure: AMPUTATION, ABOVE KNEE;  Surgeon: DAYNE Florez II, MD;  Location: [facility] OR [address] [place] [address];  Service: Vascular;  Laterality: Right;    Angiogram - Right Extremity Right 7/9/15    angiogram-left leg  10/6/15    ANGIOGRAPHY OF LOWER EXTREMITY Left 4/29/2021    Procedure: ANGIOGRAM, LOWER EXTREMITY;  Surgeon: Teddy Huber MD;  Location: [facility] OR [address] [place] [address];  Service: Vascular;  Laterality: Left;    BELOW KNEE AMPUTATION OF LOWER EXTREMITY Right 12/28/2021    Procedure: AMPUTATION, BELOW KNEE;  Surgeon: Kaitlyn Rojas MD;  Location: [facility] OR;  Service: General;  Laterality: Right;    CATHETERIZATION OF BOTH LEFT AND RIGHT HEART N/A 12/18/2019    Procedure: CATHETERIZATION, HEART, BOTH LEFT AND RIGHT;  Surgeon: Que Fernando III, MD;  Location: [facility] CATH LAB;  Service: Cardiology;  Laterality: N/A;    CORONARY ANGIOGRAPHY N/A 12/18/2019    Procedure: ANGIOGRAM, CORONARY ARTERY;  Surgeon: Que Fernando III, MD;  Location: [facility] CATH LAB;  Service: Cardiology;  Laterality: N/A;    CORONARY ANGIOGRAPHY INCLUDING BYPASS GRAFTS WITH CATHETERIZATION OF LEFT HEART N/A 7/28/2020    Procedure: ANGIOGRAM, CORONARY, INCLUDING BYPASS GRAFT, WITH LEFT HEART CATHETERIZATION, 9 am;  Surgeon: Rachel Easley MD;   Location: [facility] CATH LAB;  Service: Cardiology;  Laterality: N/A;    CORONARY ARTERY BYPASS GRAFT (CABG) N/A 1/14/2020    Procedure: CORONARY ARTERY BYPASS GRAFT (CABG) x 1     Off Pump;  Surgeon: Huang Altamirano MD;  Location: [facility] OR [address] [place] [address];  Service: Cardiovascular;  Laterality: N/A;    CREATION OF FEMORAL-TIBIAL ARTERY BYPASS Left 4/29/2021    Procedure: CREATION, BYPASS, ARTERIAL, FEMORAL TO ANTERIOR TIBIAL;  Surgeon: Teddy Huber MD;  Location: [facility] OR [address] [place] [address];  Service: Vascular;  Laterality: Left;    CREATION OF FEMOROPOPLITEAL ARTERIAL BYPASS USING GRAFT Left 8/18/2020    Procedure: CREATION, BYPASS, ARTERIAL, FEMORAL TO POPLITEAL, USING GRAFT, LEFT LOWER EXTREMITY;  Surgeon: Teddy Huber MD;  Location: [facility];  Service: Vascular;  Laterality: Left;  REQUEST 7:15 A.M. START----COVID NEGATIVE ON 8/17 1ST CASE STARTE PER LEANA ON 8/7/2020 @ 942AM-  RN PREOP 8/12/2020   T/S-----CLEARED BY CARDS-------PENDING INSURANCE    DEBRIDEMENT OF FOOT Left 9/8/2020    Procedure: DEBRIDEMENT, FOOT;  Surgeon: Rosio Mayes DPM;  Location: [facility];  Service: Podiatry;  Laterality: Left;  request neoxx .   RN Pre Op 9-4-2020, Covid negative on 9/5/20. C A    DEBRIDEMENT OF FOOT  3/4/2021    Procedure: DEBRIDEMENT, FOOT;  Surgeon: Teddy Huber MD;  Location: [facility] OR;  Service: Vascular;;    DEBRIDEMENT OF FOOT Left 3/9/2021    Procedure: DEBRIDEMENT, FOOT, bone biopsy;  Surgeon: Rosio Mayes DPM;  Location: [facility] OR;  Service: Podiatry;  Laterality: Left;  Request neoxx---COVID IN AM  REQUESTING NOON START  RN Phone Pre op.On Blood thinners Plavix and Eliquis.  Covid am of surgery. C A    DEBRIDEMENT OF FOOT Left 5/4/2021    Procedure: DEBRIDEMENT, FOOT;  Surgeon: Farooq Morley DPM;  Location: [address];  Service: Podiatry;  Laterality: Left;    INSERTION OF TUNNELED CENTRAL VENOUS HEMODIALYSIS CATHETER N/A 1/27/2020    Procedure: Insertion, Catheter, Central Venous,  Hemodialysis;  Surgeon: ESTEBAN Gomez III, MD;  Location: [facility] CATH LAB;  Service: Peripheral Vascular;  Laterality: N/A;    INSERTION OF TUNNELED CENTRAL VENOUS HEMODIALYSIS CATHETER  5/10/2023    Procedure: Insertion, Catheter, Central Venous, Hemodialysis;  Surgeon: Romulo Queen MD;  Location: [facility] CATH LAB;  Service: Cardiology;;    PERCUTANEOUS TRANSLUMINAL ANGIOPLASTY N/A 3/4/2021    Procedure: PTA (ANGIOPLASTY, PERCUTANEOUS, TRANSLUMINAL);  Surgeon: Teddy Huber MD;  Location: [facility] OR;  Service: Vascular;  Laterality: N/A;    REMOVAL OF ARTERIOVENOUS GRAFT Left 5/27/2021    Procedure: REMOVAL, GRAFT, LEFT LOWER EXTREMITY, WOUND EXPLORATION;  Surgeon: Teddy Huber MD;  Location: [facility] OR 2ND FLR;  Service: Vascular;  Laterality: Left;    REMOVAL OF NAIL OF DIGIT Left 3/9/2021    Procedure: REMOVAL, NAIL, DIGIT;  Surgeon: Rosio Mayes DPM;  Location: [facility] OR;  Service: Podiatry;  Laterality: Left;    RIGHT HEART CATHETERIZATION Right 5/10/2023    Procedure: INSERTION, CATHETER, RIGHT HEART;  Surgeon: Romulo Queen MD;  Location: [facility] CATH LAB;  Service: Cardiology;  Laterality: Right;    THROMBECTOMY Left 3/4/2021    Procedure: THROMBECTOMY, LEFT LOWER EXTREMITY BYPASS GRAFT, ANGIOGRAM, POSSIBLE INTERVENTION, POSSIBLE LEFT LOWER EXTREMITY BYPASS;  Surgeon: Teddy Huber MD;  Location: [facility] OR;  Service: Vascular;  Laterality: Left;    THROMBECTOMY Left 4/29/2021    Procedure: GRAFT THROMBECTOMY, LEFT LOWER EXTREMITY;  Surgeon: Teddy Huber MD;  Location: [facility] OR 2ND FLR;  Service: Vascular;  Laterality: Left;  14.5 min  1179.85 mGy  341.01 Gycm2  240 ml dye    THROMBECTOMY  10/22/2021    Procedure: THROMBECTOMY;  Surgeon: Saad Arenas MD;  Location: [facility] CATH LAB/EP;  Service: Cardiology;;       Review of patient's allergies indicates:   Allergen Reactions    Ciprofloxacin Itching    Iodine      Kidney injury    Pcn [penicillins]      Rash; tolerated ceftriaxone  on 1/13/20     Current Facility-Administered Medications   Medication Frequency    0.9%  NaCl infusion Once    acetaminophen tablet 1,000 mg ED 1 Time    albuterol-ipratropium 2.5 mg-0.5 mg/3 mL nebulizer solution 3 mL Q4H PRN    [START ON 6/13/2024] allopurinol split tablet 50 mg Every other day    apixaban tablet 5 mg BID    atorvastatin tablet 80 mg Daily    bisacodyL suppository 10 mg Daily PRN    calcitRIOL capsule 0.5 mcg Daily    calcium acetate(phosphat bind) capsule 667 mg TID WM    carvediloL tablet 6.25 mg BID    clopidogreL tablet 75 mg Daily    dextrose 10% bolus 125 mL 125 mL PRN    dextrose 10% bolus 250 mL 250 mL PRN    famotidine tablet 20 mg Daily    glucagon (human recombinant) injection 1 mg PRN    glucose chewable tablet 16 g PRN    glucose chewable tablet 24 g PRN    heparin (porcine) injection 1,000 Units PRN    hydrALAZINE tablet 50 mg TID PRN    insulin aspart U-100 pen 0-5 Units QID (AC + HS) PRN    melatonin tablet 6 mg Nightly PRN    naloxone 0.4 mg/mL injection 0.02 mg PRN    [START ON 6/13/2024] sertraline tablet 100 mg Daily    sodium bicarbonate tablet 650 mg TID    sodium chloride 0.9% flush 5 mL PRN    sodium zirconium cyclosilicate packet 5 g Once     Family History       Problem Relation (Age of Onset)    Diabetes Mother, Father, Paternal Grandmother    Heart disease Maternal Grandmother    No Known Problems Maternal Grandfather, Paternal Grandfather          Tobacco Use    Smoking status: Former    Smokeless tobacco: Never   Substance and Sexual Activity    Alcohol use: No    Drug use: Yes     Types: Marijuana     Comment: occassional    Sexual activity: Yes     Partners: Male     Review of Systems   Unable to perform ROS: Mental status change   HENT:  Negative for hearing loss.    Respiratory:  Negative for chest tightness and shortness of breath.    Genitourinary:  Positive for decreased urine volume.   Skin:  Negative for wound.   Psychiatric/Behavioral:  Positive for  confusion.      Objective:     Vital Signs (Most Recent):  Temp: 98.1 °F (36.7 °C) (06/12/24 0739)  Pulse: 81 (06/12/24 0739)  Resp: 17 (06/12/24 0739)  BP: (P) 107/63 (06/12/24 1019)  SpO2: 95 % (06/12/24 0739) Vital Signs (24h Range):  Temp:  [97.6 °F (36.4 °C)-98.2 °F (36.8 °C)] 98.1 °F (36.7 °C)  Pulse:  [80-83] 81  Resp:  [10-18] 17  SpO2:  [94 %-100 %] 95 %  BP: (104-120)/(53-71) (P) 107/63     Weight: 74.5 kg (164 lb 3.9 oz) (06/12/24 0608)  Body mass index is 50.12 kg/m².  Body surface area is 1.59 meters squared.    No intake/output data recorded.     Physical Exam  Vitals and nursing note reviewed.   Constitutional:       General: She is not in acute distress.     Appearance: She is obese.      Comments: Awake and alert but disoriented   HENT:      Head: Normocephalic and atraumatic.      Nose: Nose normal.      Mouth/Throat:      Pharynx: No oropharyngeal exudate.   Eyes:      Extraocular Movements: Extraocular movements intact.      Conjunctiva/sclera: Conjunctivae normal.   Cardiovascular:      Rate and Rhythm: Normal rate and regular rhythm.      Heart sounds: Normal heart sounds.   Pulmonary:      Effort: Pulmonary effort is normal. No respiratory distress.   Abdominal:      General: Bowel sounds are normal. There is no distension.      Palpations: Abdomen is soft.   Musculoskeletal:         General: Normal range of motion.      Cervical back: Normal range of motion.      Comments: BL AKA. Non focally TTP. Moves spontaneously BL   Skin:     General: Skin is warm and dry.   Neurological:      Mental Status: She is alert.      Comments: Follows commands. Answering direct questions.   Psychiatric:         Mood and Affect: Mood normal.         Behavior: Behavior normal.          Significant Labs:  CBC:   Recent Labs   Lab 06/11/24  2240   WBC 4.54   RBC 3.26*   HGB 10.5*   HCT 31.2*      MCV 96   MCH 32.2*   MCHC 33.7     CMP:   Recent Labs   Lab 06/12/24  0147   GLU 89   CALCIUM 8.6*   ALBUMIN  2.9*   PROT 7.6   *   K 5.4*   CO2 18*   CL 95   BUN 79*   CREATININE 5.8*   ALKPHOS 155*   ALT 14   AST 25   BILITOT 1.3*     All labs within the past 24 hours have been reviewed.

## 2024-06-12 NOTE — ASSESSMENT & PLAN NOTE
Per , patient was at her optimal mental baseline the last few days before she fell forward in her wheelchair when reaching for something and hitting her head on the ground. She was unconscious for about 15 minutes after this and has continued to be encephalopathic since then. Do not suspect syncope or other cause of mental status change at this time. Seems most likely post concussive s/p head trauma given timeline from  and normal mental status just prior to this.     Labs ad imaging in ED with hyponatremia 128. Bicarb 18. Cr and BUN elevated with known ESRD. LFTS chronically elevated (T bili, ALP). Anemia at baseline. LA 0.86. Phos 6.9.  CTH with some limitations but with chronic change without evidence of superimposed acute intracranial process. CT maxillofacial without acute fracture. CT cervical spine without fracture deformity. XR hips without fracture or dislocation.   - UA pending. VBG and ammonia pending.  - Monitor neuro checks q4h  - Aspiration precautions. Fall precautions.   - Delirium precautions  - Bedside swallow prior to any PO  - Further work up if continued to be encephalopathic   - At baseline,  states she is able to name person, place and just recently has been able to feed herself (after one year of needing assistance)

## 2024-06-12 NOTE — CONSULTS
[facility]  Nephrology  Consult Note    Patient Name: Suyapa Connelly  MRN: [id number]  Admission Date: 6/11/2024  Hospital Length of Stay: 0 days  Attending Provider: Frances Bee MD   Primary Care Physician: Magen Christensen MD  Principal Problem:Postconcussive syndrome    Inpatient consult to Nephrology  Consult performed by: Sarah Faulkner, DNP, FNP-C  Consult ordered by: Leticia Ley, MARYAM  Reason for consult: ESRD        Subjective:     HPI: The patient is a 58 y.o. Black or  Female with multiple co morbidities including  ESRD on HD MFW, b/l AKAs, CAD s/p CABG, afib on eliquis, ad HTN who presents to ED on 6/11/2024 with complaints of AMS sp fall. The patient is unable to provide adequate history. Additional history and patient information was obtained from patient's , past medical records and ER records. Per chart, the patient fell forward in her wheelchair hitting her head on the ground. She was unconscious for about 15 minutes after this and has continued to be encephalopathic since then per records. Patient alert of and participating throughout exam. She was recently admitted 5/28 - 6/3 with AMS. CTH with some limitations but with chronic change without evidence of superimposed acute intracranial process. CT maxillofacial without acute fracture. CT cervical spine without fracture deformity. XR hips without fracture or dislocation. K 5.4. Other electrolytes stable. Appears comfortable on RA. Admitted to  for encephalopathy, likely post concussive in nature. Nephrology consulted for management of ESRD and HD treatment.    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    Osteomyelitis      Osteomyelitis of left foot 04/30/2021    Ulcer of left foot     Vaginal delivery     x1       Past Surgical History:   Procedure Laterality Date    ABOVE-KNEE AMPUTATION Left 5/18/2021    Procedure: AMPUTATION, ABOVE KNEE;  Surgeon: Teddy Huber MD;  Location: [address];  Service: Vascular;  Laterality: Left;    ABOVE-KNEE AMPUTATION Right 3/18/2022    Procedure: AMPUTATION, ABOVE KNEE;  Surgeon: DAYNE Florez II, MD;  Location: [address];  Service: Vascular;  Laterality: Right;    Angiogram - Right Extremity Right 7/9/15    angiogram-left leg  10/6/15    ANGIOGRAPHY OF LOWER EXTREMITY Left 4/29/2021    Procedure: ANGIOGRAM, LOWER EXTREMITY;  Surgeon: Teddy Huber MD;  Location: [address];  Service: Vascular;  Laterality: Left;    BELOW KNEE AMPUTATION OF LOWER EXTREMITY Right 12/28/2021    Procedure: AMPUTATION, BELOW KNEE;  Surgeon: Kaitlyn Rojas MD;  Location: [facility];  Service: General;  Laterality: Right;    CATHETERIZATION OF BOTH LEFT AND RIGHT HEART N/A 12/18/2019    Procedure: CATHETERIZATION, HEART, BOTH LEFT AND RIGHT;  Surgeon: Que Fernando III, MD;  Location: [facility] CATH LAB;  Service: Cardiology;  Laterality: N/A;    CORONARY ANGIOGRAPHY N/A 12/18/2019    Procedure: ANGIOGRAM, CORONARY ARTERY;  Surgeon: Que Fernando III, MD;  Location: [facility] CATH LAB;  Service: Cardiology;  Laterality: N/A;    CORONARY ANGIOGRAPHY INCLUDING BYPASS GRAFTS WITH CATHETERIZATION OF LEFT HEART N/A 7/28/2020    Procedure: ANGIOGRAM, CORONARY, INCLUDING BYPASS GRAFT, WITH LEFT HEART CATHETERIZATION, 9 am;  Surgeon: Rachel Easley MD;  Location: [facility] CATH LAB;  Service: Cardiology;  Laterality: N/A;    CORONARY ARTERY BYPASS GRAFT (CABG) N/A 1/14/2020    Procedure: CORONARY ARTERY BYPASS GRAFT (CABG) x 1     Off Pump;  Surgeon: Huang Altamirano MD;  Location: [address];  Service: Cardiovascular;  Laterality: N/A;    CREATION OF FEMORAL-TIBIAL ARTERY BYPASS Left  4/29/2021    Procedure: CREATION, BYPASS, ARTERIAL, FEMORAL TO ANTERIOR TIBIAL;  Surgeon: Teddy Huber MD;  [facility];  Service: Vascular;  Laterality: Left;    CREATION OF FEMOROPOPLITEAL ARTERIAL BYPASS USING GRAFT Left 8/18/2020    Procedure: CREATION, BYPASS, ARTERIAL, FEMORAL TO POPLITEAL, USING GRAFT, LEFT LOWER EXTREMITY;  Surgeon: Teddy Huber MD;  Location: [facility] OR;  Service: Vascular;  Laterality: Left;  REQUEST 7:15 A.M. START----COVID NEGATIVE ON 8/17  1ST CASE STARTE PER LEANA ON 8/7/2020 @ 942AM-LO  RN PREOP 8/12/2020   T/S-----CLEARED BY CARDS-------PENDING INSURANCE    DEBRIDEMENT OF FOOT Left 9/8/2020    Procedure: DEBRIDEMENT, FOOT;  Surgeon: Rosio Mayes DPM;  Location: [facility] OR;  Service: Podiatry;  Laterality: Left;  request neoxx .   RN Pre Op 9-4-2020, Covid negative on 9/5/20. C A    DEBRIDEMENT OF FOOT  3/4/2021    Procedure: DEBRIDEMENT, FOOT;  Surgeon: Teddy Huber MD;  Location: [facility] OR;  Service: Vascular;;    DEBRIDEMENT OF FOOT Left 3/9/2021    Procedure: DEBRIDEMENT, FOOT, bone biopsy;  Surgeon: Rosio Mayes DPM;  Location: [facility] OR;  Service: Podiatry;  Laterality: Left;  Request neoxx---COVID IN AM  REQUESTING NOON START  RN Phone Pre op.On Blood thinners Plavix and Eliquis.  Covid am of surgery. C A    DEBRIDEMENT OF FOOT Left 5/4/2021    Procedure: DEBRIDEMENT, FOOT;  Surgeon: Farooq Morley DPM;  Location: [facility] OR 2ND FLR;  Service: Podiatry;  Laterality: Left;    INSERTION OF TUNNELED CENTRAL VENOUS HEMODIALYSIS CATHETER N/A 1/27/2020    Procedure: Insertion, Catheter, Central Venous, Hemodialysis;  Surgeon: ESTEBAN Gomez III, MD;  Location: [facility] CATH LAB;  Service: Peripheral Vascular;  Laterality: N/A;    INSERTION OF TUNNELED CENTRAL VENOUS HEMODIALYSIS CATHETER  5/10/2023    Procedure: Insertion, Catheter, Central Venous, Hemodialysis;  Surgeon: Romulo Queen MD;  Location: [facility] CATH LAB;  Service: Cardiology;;     PERCUTANEOUS TRANSLUMINAL ANGIOPLASTY N/A 3/4/2021    Procedure: PTA (ANGIOPLASTY, PERCUTANEOUS, TRANSLUMINAL);  Surgeon: Teddy Huber MD;  Location: [facility] OR;  Service: Vascular;  Laterality: N/A;    REMOVAL OF ARTERIOVENOUS GRAFT Left 5/27/2021    Procedure: REMOVAL, GRAFT, LEFT LOWER EXTREMITY, WOUND EXPLORATION;  Surgeon: Teddy Huber MD;  Location: [facility] OR 2ND FLR;  Service: Vascular;  Laterality: Left;    REMOVAL OF NAIL OF DIGIT Left 3/9/2021    Procedure: REMOVAL, NAIL, DIGIT;  Surgeon: Rosio Mayes DPM;  Location: [facility] OR;  Service: Podiatry;  Laterality: Left;    RIGHT HEART CATHETERIZATION Right 5/10/2023    Procedure: INSERTION, CATHETER, RIGHT HEART;  Surgeon: Romulo Queen MD;  Location: [facility] CATH LAB;  Service: Cardiology;  Laterality: Right;    THROMBECTOMY Left 3/4/2021    Procedure: THROMBECTOMY, LEFT LOWER EXTREMITY BYPASS GRAFT, ANGIOGRAM, POSSIBLE INTERVENTION, POSSIBLE LEFT LOWER EXTREMITY BYPASS;  Surgeon: Teddy Huber MD;  Location: [facility] OR;  Service: Vascular;  Laterality: Left;    THROMBECTOMY Left 4/29/2021    Procedure: GRAFT THROMBECTOMY, LEFT LOWER EXTREMITY;  Surgeon: Teddy Huber MD;  Location: [facility] OR [facility] FLR;  Service: Vascular;  Laterality: Left;  14.5 min  1179.85 mGy  341.01 Gycm2  240 ml dye    THROMBECTOMY  10/22/2021    Procedure: THROMBECTOMY;  Surgeon: Saad Arenas MD;  Location: [facility] CATH LAB/EP;  Service: Cardiology;;       Review of patient's allergies indicates:   Allergen Reactions    Ciprofloxacin Itching    Iodine      Kidney injury    Pcn [penicillins]      Rash; tolerated ceftriaxone on 1/13/20     Current Facility-Administered Medications   Medication Frequency    0.9%  NaCl infusion Once    acetaminophen tablet 1,000 mg ED 1 Time    albuterol-ipratropium 2.5 mg-0.5 mg/3 mL nebulizer solution 3 mL Q4H PRN    [START ON 6/13/2024] allopurinol split tablet 50 mg Every other day    apixaban tablet 5 mg BID    atorvastatin  tablet 80 mg Daily    bisacodyL suppository 10 mg Daily PRN    calcitRIOL capsule 0.5 mcg Daily    calcium acetate(phosphat bind) capsule 667 mg TID WM    carvediloL tablet 6.25 mg BID    clopidogreL tablet 75 mg Daily    dextrose 10% bolus 125 mL 125 mL PRN    dextrose 10% bolus 250 mL 250 mL PRN    famotidine tablet 20 mg Daily    glucagon (human recombinant) injection 1 mg PRN    glucose chewable tablet 16 g PRN    glucose chewable tablet 24 g PRN    heparin (porcine) injection 1,000 Units PRN    hydrALAZINE tablet 50 mg TID PRN    insulin aspart U-100 pen 0-5 Units QID (AC + HS) PRN    melatonin tablet 6 mg Nightly PRN    naloxone 0.4 mg/mL injection 0.02 mg PRN    [START ON 6/13/2024] sertraline tablet 100 mg Daily    sodium bicarbonate tablet 650 mg TID    sodium chloride 0.9% flush 5 mL PRN    sodium zirconium cyclosilicate packet 5 g Once     Family History       Problem Relation (Age of Onset)    Diabetes Mother, Father, Paternal Grandmother    Heart disease Maternal Grandmother    No Known Problems Maternal Grandfather, Paternal Grandfather          Tobacco Use    Smoking status: Former    Smokeless tobacco: Never   Substance and Sexual Activity    Alcohol use: No    Drug use: Yes     Types: Marijuana     Comment: occassional    Sexual activity: Yes     Partners: Male     Review of Systems   Unable to perform ROS: Mental status change   HENT:  Negative for hearing loss.    Respiratory:  Negative for chest tightness and shortness of breath.    Genitourinary:  Positive for decreased urine volume.   Skin:  Negative for wound.   Psychiatric/Behavioral:  Positive for confusion.      Objective:     Vital Signs (Most Recent):  Temp: 98.1 °F (36.7 °C) (06/12/24 0739)  Pulse: 81 (06/12/24 0739)  Resp: 17 (06/12/24 0739)  BP: (P) 107/63 (06/12/24 1019)  SpO2: 95 % (06/12/24 0739) Vital Signs (24h Range):  Temp:  [97.6 °F (36.4 °C)-98.2 °F (36.8 °C)] 98.1 °F (36.7 °C)  Pulse:  [80-83] 81  Resp:  [10-18] 17  SpO2:   [94 %-100 %] 95 %  BP: (104-120)/(53-71) (P) 107/63     Weight: 74.5 kg (164 lb 3.9 oz) (06/12/24 0608)  Body mass index is 50.12 kg/m².  Body surface area is 1.59 meters squared.    No intake/output data recorded.     Physical Exam  Vitals and nursing note reviewed.   Constitutional:       General: She is not in acute distress.     Appearance: She is obese.      Comments: Awake and alert but disoriented   HENT:      Head: Normocephalic and atraumatic.      Nose: Nose normal.      Mouth/Throat:      Pharynx: No oropharyngeal exudate.   Eyes:      Extraocular Movements: Extraocular movements intact.      Conjunctiva/sclera: Conjunctivae normal.   Cardiovascular:      Rate and Rhythm: Normal rate and regular rhythm.      Heart sounds: Normal heart sounds.   Pulmonary:      Effort: Pulmonary effort is normal. No respiratory distress.   Abdominal:      General: Bowel sounds are normal. There is no distension.      Palpations: Abdomen is soft.   Musculoskeletal:         General: Normal range of motion.      Cervical back: Normal range of motion.      Comments: BL AKA. Non focally TTP. Moves spontaneously BL   Skin:     General: Skin is warm and dry.   Neurological:      Mental Status: She is alert.      Comments: Follows commands. Answering direct questions.   Psychiatric:         Mood and Affect: Mood normal.         Behavior: Behavior normal.          Significant Labs:  CBC:   Recent Labs   Lab 06/11/24  2240   WBC 4.54   RBC 3.26*   HGB 10.5*   HCT 31.2*      MCV 96   MCH 32.2*   MCHC 33.7     CMP:   Recent Labs   Lab 06/12/24  0147   GLU 89   CALCIUM 8.6*   ALBUMIN 2.9*   PROT 7.6   *   K 5.4*   CO2 18*   CL 95   BUN 79*   CREATININE 5.8*   ALKPHOS 155*   ALT 14   AST 25   BILITOT 1.3*     All labs within the past 24 hours have been reviewed.    Assessment/Plan:     Neuro  * Postconcussive syndrome  - defer to primary team     Renal/  ESRD (end stage renal disease)  58 y.o. Black or   Female ESRD-HD M-W-F presents to ED on 6/11/2024 with diagnosis of: Altered mental status [R41.82];Chest pain [R07.9]   Nephrology consulted for inpatient ESRD-HD management    Outpatient HD Information:  -Dialysis modality: Hemodialysis  -Outpatient HD unit: [facility]  -Nephrologist: ?  -HD TX days: Monday/Wednesday/Friday, duration of treatment: 3 hrs   -Last HD TX prior to hospital admission: 6/10/24  -Dialysis access: dialysis catheter   -Residual urine: Minium  -EDW: 67 kg     Assessment:   - Will provide dialysis today (06/12/2024) with UF - 1-2L as BP tolerates for metabolic clearance and volume management   - L CVC exposed on exam, no dressing, site unkept, IR consulted, HD RN at bedside to perform site care and placed new dressing. IR consulted. Blood cultures pending. No fevers.   - Labs reviewed and dialysate to be adjusted to current labs.   - Continue to monitor intake and output  - Please avoid gadolinium, fleets, phos-based laxatives, NSAIDs  - Dialysis thrice weekly unless more urgent indications arise. Will evaluate RRT requirements Daily.    Anemia of ESRD   Recent Labs   Lab 06/11/24  2240   WBC 4.54   HGB 10.5*   HCT 31.2*        Lab Results   Component Value Date    FESATURATED 26 02/04/2024    FERRITIN 1,702 (H) 02/04/2024       - Goal in ESRD is Hgb of 10-11. Hgb 10.5. At goal.  - EPO can be administered and dosed per his OP unit upon discharge.    Mineral Bone Disease in ESRD   Lab Results   Component Value Date     (H) 05/22/2024    CALCIUM 8.6 (L) 06/12/2024    ALBUMIN 2.9 (L) 06/12/2024    CAION 0.92 (L) 05/17/2023    PHOS 6.9 (H) 06/12/2024       - F/U PO4, Mg, Calcium. And albumin levels daily.   - Renal diet with protein intake goal 1.5 g/kg/d with 1 L fluid restriction   - Novasource with meals  - Restart home phos binder, phos 6.9          Thank you for your consult. I will follow-up with patient. Please contact us if you have any additional questions.    Sarah BEEBE  CHRIS Faulkner, FNP-C  Nephrology  [facility]

## 2024-06-12 NOTE — HPI
"Suyapa Connelly is a 58 y.o. male with PMHx of ESRD on HD MFW, b/l AKAs, CAD s/p CABG, afib on eliquis, HTN presenting to [facility] ED with AMS after hitting her hear. Per , patient was at her optimal mental baseline the last few days before she fell forward in her wheelchair when reaching for something and hitting her head on the ground. She was unconscious for about 15 minutes after this and has continued to be encephalopathic since then.  reports she was doing better than she has in about one year just prior to this. She is normally unaware of time (month, year) at baseline but is able to name her self, her , and place. She is WC bound at baseline. She was unable to feed herself for the past year but on June 9th was able to feed herself for the first time. She did not seem unwell in any way prior to this event. She was reaching forward and hit her nose on the floor. She did not seem to be fainting leading to the fall. Unable to obtain history for patient 2/2 encephalopathy. She follows commands and opens her eyes to voice and moves all extremities spontaneously.     She was just admitted to this hospital for AMS 5/28- 6/3.   Per discharge summary:  "Suyapa Connelly was placed in  observation for workup of encephalopathy. Patient continues to be confused and minimally engaged. She is able to follow commands with prompting. UA infectious, reflex culture pending. Start empiric rocephin - cultures did not isolate an organism in predominance. EEG obtained: "No epileptiform discharges, periodic discharges, lateralized rhythmic delta activity or electrographic seizures were seen. IMPRESSION: "Abnormal EEG due to a mild to moderate generalized cerebral dysfunction with no electrographic seizures or indications of seizure tendency."  MRI brain without acute changes, revealed "Overall degree of parenchymal volume loss has notably progressed when compared to the 05/06/2018 brain MRI. Neuropsychiatric " "consultation could be considered, as warranted clinically."     Medically ready to be discharged, discussed with  via phone call who initially wished to pursue NH placement. Discharge complicated as patient's family stopped answering the phone or all available numbers were out of service. APS report made per CM. Family came to bedside a few days later requesting to take the patient home - have long term plans to place her at [facility]. Per family patient's mental status had greatly improved since admission."    ED: AFVSS. Labs and imaging in ED with hyponatremia 128. Bicarb 18. Cr and BUN elevated with known ESRD. LFTS chronically elevated (T bili, ALP). Anemia at baseline. LA 0.86. Phos 6.9.  CTH with some limitations but with chronic change without evidence of superimposed acute intracranial process. CT maxillofacial without acute fracture. CT cervical spine without fracture deformity. XR hips without fracture or dislocation. Admitted to  for encephalopathy, likely post concussive in nature.    "

## 2024-06-12 NOTE — ASSESSMENT & PLAN NOTE
Per , patient was at her optimal mental baseline the last few days before she fell forward in her wheelchair when reaching for something and hitting her head on the ground. She was unconsciousness for about 15 minutes after this and has continued to be encephalopathic since then. Do not suspect syncope or other cause of mental status change at this time. Seems most likely post concussive s/p head trauma given timeline from  and normal mental status just prior to this.     Labs ad imaging in ED with hyponatremia 128. Bicarb 18. Cr and BUN elevated with known ESRD. LFTS chronically elevated (T bili, ALP). Anemia at baseline. LA 0.86. Phos 6.9.  CTH with some limitations but with chronic change without evidence of superimposed acute intracranial process. CT maxillofacial without acute fracture. CT cervical spine without fracture deformity. XR hips without fracture or dislocation.   - UA pending. VBG and ammonia pending.  - Monitor neuro checks q4h  - Aspiration precautions. Fall precautions.   - Delirium precautions  - Bedside swallow prior to any PO  - Further work up if continued to be encephalopathic   - At baseline,  states she is able to name person, place and just recently has been able to feed herself (after one year of needing assistance)

## 2024-06-12 NOTE — PROGRESS NOTES
Dialysis completed. Left IJ tunneled CVC flushed, heparinized, capped and ends wrapped with sterile gauze. Dialyzed for 3 hours with fluid removal of 2 liters. Tolerated well with stable vital signs. Returned to her room by stretcher.

## 2024-06-12 NOTE — ASSESSMENT & PLAN NOTE
58 y.o. Black or  Female ESRD-HD M-W-F presents to ED on 6/11/2024 with diagnosis of: Altered mental status [R41.82];Chest pain [R07.9]   Nephrology consulted for inpatient ESRD-HD management    Outpatient HD Information:  -Dialysis modality: Hemodialysis  -Outpatient HD unit: [facility]  -Nephrologist: ?  -HD TX days: Monday/Wednesday/Friday, duration of treatment: 3 hrs   -Last HD TX prior to hospital admission: 6/10/24  -Dialysis access: dialysis catheter   -Residual urine: Minium  -EDW: 67 kg     Assessment:   - Will provide dialysis today (06/12/2024) with UF - 1-2L as BP tolerates for metabolic clearance and volume management   - Labs reviewed and dialysate to be adjusted to current labs.   - Continue to monitor intake and output  - Please avoid gadolinium, fleets, phos-based laxatives, NSAIDs  - Dialysis thrice weekly unless more urgent indications arise. Will evaluate RRT requirements Daily.    Anemia of ESRD   Recent Labs   Lab 06/11/24  2240   WBC 4.54   HGB 10.5*   HCT 31.2*        Lab Results   Component Value Date    FESATURATED 26 02/04/2024    FERRITIN 1,702 (H) 02/04/2024       - Goal in ESRD is Hgb of 10-11. Hgb 10.5. At goal.  - EPO can be administered and dosed per his OP unit upon discharge.    Mineral Bone Disease in ESRD   Lab Results   Component Value Date     (H) 05/22/2024    CALCIUM 8.6 (L) 06/12/2024    ALBUMIN 2.9 (L) 06/12/2024    CAION 0.92 (L) 05/17/2023    PHOS 6.9 (H) 06/12/2024       - F/U PO4, Mg, Calcium. And albumin levels daily.   - Renal diet with protein intake goal 1.5 g/kg/d with 1 L fluid restriction   - Novasource with meals  - Restart home phos binder, phos 6.9

## 2024-06-12 NOTE — PLAN OF CARE
[facility]  Discharge Assessment    Primary Care Provider: Feliciano Nguyen III, PA-C     Discharge Assessment (most recent)       BRIEF DISCHARGE ASSESSMENT - 06/12/24 1147          Discharge Planning    Assessment Type Discharge Planning Brief Assessment     Resource/Environmental Concerns none     Support Systems Spouse/significant other     Equipment Currently Used at Home wheelchair;slide board;bath bench     Current Living Arrangements home     Patient/Family Anticipates Transition to home;home with family;inpatient rehabilitation facility     Patient/Family Anticipated Services at Transition skilled nursing;home health care     DME Needed Upon Discharge  none     Discharge Plan A Home with family     Discharge Plan B Home Health                   Pt is a 58 y.o. female admitted with post concussive syndrome. She has a PMH of ESRD ([facility]) , [place]rell AKA and Afib on Eliquis. She lives with her  in a single story house with no steps. Her  provides assistance with her ADls. He will provide transportation home. [facility] Discharge Packet given to patient and/or family with understanding verbalized.   name and number and estimated discharge date written on white board in patient's room with request to call for any questions or concerns.  Will continue to follow for needs.  Discharge Plan A and Plan B have been determined by review of patient's clinical status, future medical and therapeutic needs, and coverage/benefits for post-acute care in coordination with multidisciplinary team members.  Feliciano Bridges RN,BSN

## 2024-06-12 NOTE — PROGRESS NOTES
[facility] H[facility] NOTE     Patient currently on hemodialysis for removal of uremic toxins .     Patient seen and evaluated on hemodialysis, tolerating treatment, see HD flowsheet for vitals and assessments.      No Hypotension, chest pain, shortness of breath, cramping, nausea or vomiting.     Target UF: 2 L as tolerated, keep MAP >65.  K 5.8, potassium bath adjusted.   See consult note for further recommendations    KATHLEEN Faulkner DNP, APRN, FNP-C  Department of Nephrology  [facility]  Pager: [phone]

## 2024-06-12 NOTE — ASSESSMENT & PLAN NOTE
- Volume management with HD  - Continue beta blocker    Results for orders placed during the hospital encounter of 05/07/24    Echo    Interpretation Summary    Left Ventricle: The left ventricle is mildly dilated. Ventricular mass is normal. Normal wall thickness. Severe global hypokinesis present. There is severely reduced systolic function. Ejection fraction by visual approximation is 15%. There is diastolic dysfunction.    Right Ventricle: Severe right ventricular enlargement. Wall thickness is normal. Right ventricle wall motion has global hypokinesis. Systolic function is mildly reduced.    Left Atrium: Left atrium is severely dilated. There is an atrial septal aneurysm.    Right Atrium: Right atrium is dilated.    Aortic Valve: There is mild aortic regurgitation.    Mitral Valve: There is mild regurgitation.    Tricuspid Valve: There is annular dilation present. There is normal leaflet mobility. There is severe functional regurgitation.    Pulmonary Artery: The estimated pulmonary artery systolic pressure is at least 24 mmHg. PASP is likely under-estimated in the setting of severe tricuspid regurgitation.    IVC/SVC: Central venous pressure of at least 8 mmHg.    No vegetation seen.

## 2024-06-12 NOTE — ASSESSMENT & PLAN NOTE
Patient with Chronic debility due to other reduced mobility and wheelchair bound, bilateral AKA . Latest AMPAC and GEMS scores have not been reviewed. Evaluation for etiology is complete. Plan includes progressive mobility protocol initated and fall precautions in place.

## 2024-06-12 NOTE — FIRST PROVIDER EVALUATION
Medical screening examination initiated.  I have conducted a focused provider triage encounter, findings are as follows:    Brief history of present illness:  59 yo F w/ h/o b/l AKAs, ESRD on HD, CAD s/p CABG, afib on eliquis, HTN. Patient reaching under table after bath when she fell and hit face and head. Seen on 5/28 for AMS in the setting of missed HD    Vitals:    06/11/24 2052   BP: 110/71   Pulse: 83   Resp: 16   Temp: 98.1 °F (36.7 °C)   TempSrc: Oral   SpO2: 100%   Weight: 69.9 kg (154 lb)       Pertinent physical exam:  L chest tunneled cather.     Brief workup plan:  ct head, maxillofacial, neck    Preliminary workup initiated; this workup will be continued and followed by the physician or advanced practice provider that is assigned to the patient when roomed.

## 2024-06-12 NOTE — CARE UPDATE
Suyapa Connelly was placed in  observation out of concern for post-concussive syndrome after falling out of her wheelchair onto her face. Imaging without signs of acute injury. Nose bleeding reported at time of event, but controlled at time of admission. HDS. Labs with electrolyte derangement. At time of my exam patient is alert and oriented to self, place, situation. Patient able to follow some commands. Patient failed bedside swallow study with nursing - SLP consulted.  at bedside concerned patient unable to perform ADLS (feeding, rolling self in wheelchair) and pt does have difficulty following more complex commands on exam. OT consulted. Nephrology consulted for HD needs and for tunneled cath care. Tunneled cath uncovered and un sutured on exam. Line secured and dressing replaced.  HD today. Repeat labs this evening. Check blood cultures given presentation with exposed catheter site, though low suspicion for bacteremia given HDS, AF, no leukocytosis.

## 2024-06-12 NOTE — ASSESSMENT & PLAN NOTE
Patient has hyponatremia which is uncontrolled,We will aim to correct the sodium by 4-6mEq in 24 hours. We will monitor sodium Daily. The hyponatremia is due to renal insufficiency. We will obtain the following studies: We will treat the hyponatremia with Hemodialysis. The patient's sodium results have been reviewed and are listed below.  Recent Labs   Lab 06/12/24  0147   *

## 2024-06-12 NOTE — ASSESSMENT & PLAN NOTE
This patient has hyperkalemia which is uncontrolled. We will monitor for arrhythmias with EKG or continuous telemetry. We will treat the hyperkalemia with Potassium Binders and HD . The likely etiology of the hyperkalemia is ESRD.  The patients latest potassium has been reviewed and the results are listed below  Recent Labs   Lab 06/12/24  0147   K 5.4*

## 2024-06-12 NOTE — HPI
The patient is a 58 y.o. Black or  Female with multiple co morbidities including  ESRD on HD MFW, b/l AKAs, CAD s/p CABG, afib on eliquis, ad HTN who presents to ED on 6/11/2024 with complaints of AMS sp fall. The patient is unable to provide adequate history. Additional history and patient information was obtained from patient's , past medical records and ER records. Per chart, the patient fell forward in her wheelchair hitting her head on the ground. She was unconscious for about 15 minutes after this and has continued to be encephalopathic since then per records. Patient alert of and participating throughout exam. She was recently admitted 5/28 - 6/3 with AMS. CTH with some limitations but with chronic change without evidence of superimposed acute intracranial process. CT maxillofacial without acute fracture. CT cervical spine without fracture deformity. XR hips without fracture or dislocation. K 5.4. Other electrolytes stable. Appears comfortable on RA. Admitted to  for encephalopathy, likely post concussive in nature. Nephrology consulted for management of ESRD and HD treatment.

## 2024-06-12 NOTE — ED PROVIDER NOTES
Encounter Date: 6/11/2024       History     Chief Complaint   Patient presents with    Headache     Pt states she has a headache, Hx of altered mental status and encephalopathy, recently admitted for same. Oriented to self at this time. Per , Pt fell out of wheelchair tonight after bath, +head trauma, +LOC.  states her nose starting bleeding, no bleeding at this time. Dialysis Mon, Wed, Fri.      58 y.o. female with CKD, DM, GERD, history of bilateral AKA presents for fall.  Per family at bedside, patient was in her wheelchair when she fell forward, hitting her face.  She lost consciousness for approximately 10 minutes.  Since then, she has been slightly confused and has had a headache.  She had no preceding symptoms including chest pain, shortness of breath.  She has no recent illness.  Patient reports mild headache and right leg pain and denies any other symptoms currently    The history is provided by the patient, a relative and medical records.     Review of patient's allergies indicates:   Allergen Reactions    Ciprofloxacin Itching    Iodine      Kidney injury    Pcn [penicillins]      Rash; tolerated ceftriaxone on 1/13/20     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    Osteomyelitis     Osteomyelitis of left foot 04/30/2021    Ulcer of left foot     Vaginal delivery     x1     Past Surgical History:   Procedure Laterality Date    ABOVE-KNEE AMPUTATION Left 5/18/2021    Procedure: AMPUTATION, ABOVE KNEE;  Surgeon: Teddy Huber MD;  Location: [facility] OR [address] [place] [address];  Service: Vascular;  Laterality: Left;    ABOVE-KNEE AMPUTATION Right 3/18/2022    Procedure: AMPUTATION, ABOVE KNEE;  Surgeon: DAYNE Florez II, MD;   Location: [address];  Service: Vascular;  Laterality: Right;    Angiogram - Right Extremity Right 7/9/15    angiogram-left leg  10/6/15    ANGIOGRAPHY OF LOWER EXTREMITY Left 4/29/2021    Procedure: ANGIOGRAM, LOWER EXTREMITY;  Surgeon: Teddy Huber MD;  Location: [address];  Service: Vascular;  Laterality: Left;    BELOW KNEE AMPUTATION OF LOWER EXTREMITY Right 12/28/2021    Procedure: AMPUTATION, BELOW KNEE;  Surgeon: Kaitlyn Rojas MD;  Location: [facility] OR;  Service: General;  Laterality: Right;    CATHETERIZATION OF BOTH LEFT AND RIGHT HEART N/A 12/18/2019    Procedure: CATHETERIZATION, HEART, BOTH LEFT AND RIGHT;  Surgeon: Que Fernando III, MD;  Location: [facility] CATH LAB;  Service: Cardiology;  Laterality: N/A;    CORONARY ANGIOGRAPHY N/A 12/18/2019    Procedure: ANGIOGRAM, CORONARY ARTERY;  Surgeon: Que Fernando III, MD;  Location: [facility] CATH LAB;  Service: Cardiology;  Laterality: N/A;    CORONARY ANGIOGRAPHY INCLUDING BYPASS GRAFTS WITH CATHETERIZATION OF LEFT HEART N/A 7/28/2020    Procedure: ANGIOGRAM, CORONARY, INCLUDING BYPASS GRAFT, WITH LEFT HEART CATHETERIZATION, 9 am;  Surgeon: Rachel Easley MD;  Location: [facility] CATH LAB;  Service: Cardiology;  Laterality: N/A;    CORONARY ARTERY BYPASS GRAFT (CABG) N/A 1/14/2020    Procedure: CORONARY ARTERY BYPASS GRAFT (CABG) x 1     Off Pump;  Surgeon: Huang Altamirano MD;  Location: [address];  Service: Cardiovascular;  Laterality: N/A;    CREATION OF FEMORAL-TIBIAL ARTERY BYPASS Left 4/29/2021    Procedure: CREATION, BYPASS, ARTERIAL, FEMORAL TO ANTERIOR TIBIAL;  Surgeon: Teddy Huber MD;  Location: [address];  Service: Vascular;  Laterality: Left;    CREATION OF FEMOROPOPLITEAL ARTERIAL BYPASS USING GRAFT Left 8/18/2020    Procedure: CREATION, BYPASS, ARTERIAL, FEMORAL TO POPLITEAL, USING GRAFT, LEFT LOWER EXTREMITY;  Surgeon: Teddy Huber MD;  Location: [facility] OR;  Service: Vascular;  Laterality:  Left;  REQUEST 7:15 A.M. START----COVID NEGATIVE ON 8/17  1ST CASE STARTE PER LEANA ON 8/7/2020 @ 942AM-LO  RN PREOP 8/12/2020   T/S-----CLEARED BY CARDS-------PENDING INSURANCE    DEBRIDEMENT OF FOOT Left 9/8/2020    Procedure: DEBRIDEMENT, FOOT;  Surgeon: Rosio Mayes DPM;  Location: [facility] OR;  Service: Podiatry;  Laterality: Left;  request neoxx .   RN Pre Op 9-4-2020, Covid negative on 9/5/20. C A    DEBRIDEMENT OF FOOT  3/4/2021    Procedure: DEBRIDEMENT, FOOT;  Surgeon: Teddy Huber MD;  Location: [facility] OR;  Service: Vascular;;    DEBRIDEMENT OF FOOT Left 3/9/2021    Procedure: DEBRIDEMENT, FOOT, bone biopsy;  Surgeon: Rosio Mayes DPM;  Location: [facility] OR;  Service: Podiatry;  Laterality: Left;  Request neoxx---COVID IN AM  REQUESTING NOON START  RN Phone Pre op.On Blood thinners Plavix and Eliquis.  Covid am of surgery. C A    DEBRIDEMENT OF FOOT Left 5/4/2021    Procedure: DEBRIDEMENT, FOOT;  Surgeon: Farooq Morley DPM;  [address];  Service: Podiatry;  Laterality: Left;    INSERTION OF TUNNELED CENTRAL VENOUS HEMODIALYSIS CATHETER N/A 1/27/2020    Procedure: Insertion, Catheter, Central Venous, Hemodialysis;  Surgeon: ESTEBAN Gomez III, MD;  [facility];  Service: Peripheral Vascular;  Laterality: N/A;    INSERTION OF TUNNELED CENTRAL VENOUS HEMODIALYSIS CATHETER  5/10/2023    Procedure: Insertion, Catheter, Central Venous, Hemodialysis;  Surgeon: Romulo Queen MD;  Location: [facility] C[facility] LAB;  Service: Cardiology;;    PERCUTANEOUS TRANSLUMINAL ANGIOPLASTY N/A 3/4/2021    Procedure: PTA (ANGIOPLASTY, PERCUTANEOUS, TRANSLUMINAL);  Surgeon: Teddy Huber MD;  Location: [facility] OR;  Service: Vascular;  Laterality: N/A;    REMOVAL OF ARTERIOVENOUS GRAFT Left 5/27/2021    Procedure: REMOVAL, GRAFT, LEFT LOWER EXTREMITY, WOUND EXPLORATION;  Surgeon: Teddy Huber MD;  Location: [facility] OR 2ND FLR;  Service: Vascular;  Laterality: Left;    REMOVAL OF NAIL  OF DIGIT Left 3/9/2021    Procedure: REMOVAL, NAIL, DIGIT;  Surgeon: Rosio Mayes DPM;  Location: [facility] OR;  Service: Podiatry;  Laterality: Left;    RIGHT HEART CATHETERIZATION Right 5/10/2023    Procedure: INSERTION, CATHETER, RIGHT HEART;  Surgeon: Romulo Queen MD;  Location: [facility] CATH LAB;  Service: Cardiology;  Laterality: Right;    THROMBECTOMY Left 3/4/2021    Procedure: THROMBECTOMY, LEFT LOWER EXTREMITY BYPASS GRAFT, ANGIOGRAM, POSSIBLE INTERVENTION, POSSIBLE LEFT LOWER EXTREMITY BYPASS;  Surgeon: Teddy Huber MD;  Location: [facility] OR;  Service: Vascular;  Laterality: Left;    THROMBECTOMY Left 4/29/2021    Procedure: GRAFT THROMBECTOMY, LEFT LOWER EXTREMITY;  Surgeon: Teddy Huber MD;  [facility];  Service: Vascular;  Laterality: Left;  14.5 min  1179.85 mGy  341.01 Gycm2  240 ml dye    THROMBECTOMY  10/22/2021    Procedure: THROMBECTOMY;  Surgeon: Saad Arenas MD;  Location: [facility] CATH LAB/EP;  Service: Cardiology;;     Family History   Problem Relation Name Age of Onset    Diabetes Mother      Diabetes Father      Heart disease Maternal Grandmother      No Known Problems Maternal Grandfather      Diabetes Paternal Grandmother      No Known Problems Paternal Grandfather      Anesthesia problems Neg Hx       Social History     Tobacco Use    Smoking status: Former    Smokeless tobacco: Never   Substance Use Topics    Alcohol use: No    Drug use: Yes     Types: Marijuana     Comment: occassional     Review of Systems   Reason unable to perform ROS: See HPI for relevant ROS.       Physical Exam     Initial Vitals [06/11/24 2052]   BP Pulse Resp Temp SpO2   110/71 83 16 98.1 °F (36.7 °C) 100 %      MAP       --         Physical Exam    Nursing note and vitals reviewed.  Constitutional:   Slightly drowsy, disoriented   HENT:   Mouth/Throat: Oropharynx is clear and moist.   Eyes: Conjunctivae and EOM are normal. Pupils are equal, round, and reactive to light. No scleral  icterus.   Cardiovascular:  Normal rate, regular rhythm and intact distal pulses.           Pulmonary/Chest: Breath sounds normal. No stridor. No respiratory distress.   Abdominal: Abdomen is soft. She exhibits no distension. There is no abdominal tenderness.   Musculoskeletal:      Comments: Bilateral AKA  Mild tenderness of the soft tissue of the right stump  No tenderness, step-offs, deformities, or tenderness to the C, T, or L-spine  Normal range of motion of all other extremities without pain  No other bony tenderness or deformity of the trunk or extremities     Neurological: She is alert. She has normal strength.   Oriented to person, place, situation, not time   Skin: Skin is warm and dry.         ED Course   Procedures  Labs Reviewed   CBC W/ AUTO DIFFERENTIAL - Abnormal; Notable for the following components:       Result Value    RBC 3.26 (*)     Hemoglobin 10.5 (*)     Hematocrit 31.2 (*)     MCH 32.2 (*)     RDW 17.7 (*)     All other components within normal limits   COMPREHENSIVE METABOLIC PANEL   URINALYSIS, REFLEX TO URINE CULTURE   ISTAT LACTATE     EKG Readings: (Independently Interpreted)   Sinus rhythm, regular, narrow complex, rate of 83, no STEMI, ST changes diffusely, no significant change compared to prior       Imaging Results              CT Head Without Contrast (Final result)  Result time 06/11/24 23:07:44      Final result by Kp Oliveira MD (06/11/24 23:07:44)                   Impression:      When accounting for limitations of the exam the intracranial appearance is consistent with chronic change without evidence for superimposed acute intracranial process.    Findings referable to the face, orbits and paranasal sinuses are dictated separately.      Electronically signed by: Kp Oliveira  Date:    06/11/2024  Time:    23:07               Narrative:    EXAMINATION:  CT HEAD WITHOUT CONTRAST    CLINICAL HISTORY:  Head trauma, abnormal mental status (Age  19-64y);    TECHNIQUE:  Low dose axial images were obtained through the head.  Coronal and sagittal reformations were also performed. Contrast was not administered.    COMPARISON:  CT examination of the brain May 28, 2024, MRI examination the brain May 29, 2024    FINDINGS:  There is motion artifact present, this diminishes the sensitivity of the examination.  When accounting for artifact the intracranial appearance is consistent with chronic change.  Involutional change noted, chronic appearing white matter change noted, subependymal periventricular nodules with mild calcifications/mineralization again noted, better demonstrated on prior examinations however appearing stable.    There is no additional evidence for intracranial mass, mass effect or midline shift, there is no evidence for acute intracranial hemorrhage.  Appropriate CSF spaces are seen at the skull base.    Findings referable to the face, orbits and paranasal sinuses are dictated separately.  The calvarium appears intact.  Minimal opacity of the mastoid air cells on the left noted.                                       CT Maxillofacial Without Contrast (Final result)  Result time 06/11/24 23:07:49      Final result by Kp Oliveira MD (06/11/24 23:07:49)                   Impression:      There is no evidence for acute facial or orbital fracture.    Dental findings are noted, correlation for acute versus chronic dental disease needed.      Electronically signed by: Kp Oliveira  Date:    06/11/2024  Time:    23:07               Narrative:    EXAMINATION:  CT MAXILLOFACIAL WITHOUT CONTRAST    CLINICAL HISTORY:  Facial trauma, blunt;    TECHNIQUE:  Low dose axial images, sagittal and coronal reformations were obtained through the face.  Contrast was not administered.    COMPARISON:  None    FINDINGS:  The globes, extraocular muscles and optic nerves of the orbits appear intact, there is no evidence for retrobulbar hematoma.    Minimal paranasal  sinus mucosal thickening is noted.  Minimal opacity of the mastoid air cells noted.  The middle ear cavity bilaterally appears appropriate.    The temporomandibular joints demonstrate chronic change.  Dental findings are noted, correlation for acute versus chronic dental disease is needed.    There is no evidence for acute facial or orbital fracture deformity.  Findings referable to the cervical spine and intracranial findings are dictated separately.                                       CT Cervical Spine Without Contrast (Final result)  Result time 06/11/24 23:07:58      Final result by Kp Oliveira MD (06/11/24 23:07:58)                   Impression:      Multilevel chronic change noted, correlation for any specific level of symptomatology is needed.    There is no evidence for acute cervical spine fracture deformity.    Additional findings as above.      Electronically signed by: Kp Oliveira  Date:    06/11/2024  Time:    23:07               Narrative:    EXAMINATION:  CT CERVICAL SPINE WITHOUT CONTRAST    CLINICAL HISTORY:  Neck trauma, dangerous injury mechanism (Age 16-64y);    TECHNIQUE:  Low dose axial images, sagittal and coronal reformations were performed though the cervical spine.  Contrast was not administered.    COMPARISON:  None    FINDINGS:  There is straightening of the cervical spine.  Multilevel chronic endplate change noted, Schmorl's node formation noted, most notably at the inferior endplate C6.  There is marginal osteophyte formation noted.  There is no high-grade spondylolisthesis, there is no evidence for high-grade or acute compression fracture deformity.  Facet arthropathy is noted, there is no evidence for facet dislocation or facet fracture deformity.  The occipital condyles articulate appropriately with the superior articular facets of C1 at the craniocervical junction.    There is chronic change at C1-2, C1 and C2 appear intact.    The C2-3 level demonstrates mild disc  osteophyte disease, mild anterior impression upon the dural sac, there is no high-grade spinal canal or foraminal stenosis.    The C3-4 level demonstrates disc osteophyte disease, mild anterior impression upon the dural sac, there is mild spinal canal stenosis.  There is no high-grade foraminal stenosis.    The C4-5 level demonstrates disc osteophyte disease with anterior impression upon the dural sac.  There is moderate to high-grade spinal canal stenosis.  There is uncovertebral spurring, there is bilateral foraminal narrowing.    The C5-6 level demonstrates disc osteophyte disease with anterior impression upon the dural sac, there is moderate spinal canal stenosis.  There is bilateral uncovertebral spurring, there is bilateral foraminal narrowing.    The C6-7 level demonstrates disc osteophyte disease, mild anterior impression upon the dural sac, mild spinal canal narrowing.  There is mild foraminal narrowing.    The C7-T1 level demonstrates no evidence for high-grade spinal canal or foraminal stenosis.    On close evaluation of available imaging, there is no evidence for acute cervical spine fracture deformity.    Vascular calcifications are noted including involving the carotid and vertebral arterial vascular, there is medial course of the internal carotid arteries noted.  There is a left internal jugular central venous catheter noted, incompletely visualized, postoperative median sternotomy noted.  Limited imaging of the lung apices demonstrates appearance of posterior layering pleural fluid, left greater than right, and ground-glass opacities of the lungs at the apices.                                       Medications   acetaminophen tablet 1,000 mg (has no administration in time range)     Medical Decision Making  58 y.o. female with CKD, DM, GERD, history of bilateral AKA presents for fall  Differentials include concussion, TBI, metabolic derangement, facial bone fracture, less likely seizure  After  mechanical fall forward onto her face from her wheelchair, had prolonged loss of consciousness, is disoriented on arrival which is not at her baseline no preceding symptoms, no chest pain, no evidence of syncope prior to her fall  No focal weakness on exam, only mild disorientation  Has some mild tenderness of the right leg, no deformity, no open wounds.  X-ray without any acute fractures of the right lower extremity  CT head without any evidence of intracranial hemorrhage or skull fracture.  Suspect severe concussion, as patient has not returned to baseline, suspect she will be admitted.  Patient care handed off to oncoming team pending re-evaluation and final dispo    Amount and/or Complexity of Data Reviewed  Labs: ordered.  Radiology: ordered.    Risk  OTC drugs.                                      Clinical Impression:  Final diagnoses:  [R41.82] Altered mental status  [S09.90XA] Closed head injury, initial encounter (Primary)  [F07.81] Postconcussive syndrome                 Azael Devlin MD  06/12/24 1931

## 2024-06-12 NOTE — PROGRESS NOTES
pt with no dressing to permacath. arrived bedside to perform site care & place new dressing on permacath as ordered.

## 2024-06-12 NOTE — ASSESSMENT & PLAN NOTE
Patient's anemia is currently controlled. Has not received any PRBCs to date. Etiology likely d/t chronic disease due to ESRD  Current CBC reviewed-   Lab Results   Component Value Date    HGB 10.5 (L) 06/11/2024    HCT 31.2 (L) 06/11/2024     Monitor serial CBC and transfuse if patient becomes hemodynamically unstable, symptomatic or H/H drops below 7/21.

## 2024-06-12 NOTE — CONSULTS
IR consulted for TDC replacement (placed by IR 5/2023). The TDC was evaluated by nephrology at bedside, no indication to replace or remove the TDC at this time. IR remains available for evaluation. Discussed with ordering team     Cindy Jacobs PA-C  Interventional Radiology  Spectra: [phone]  6/12/2024

## 2024-06-12 NOTE — PT/OT/SLP PROGRESS
Speech Language Pathology      Suyapa Connelly  MRN: [id number]    Patient not seen today secondary to off the floor for dialysis. Will follow-up for clinical swallow evaluation at a later date.      6/12/2024

## 2024-06-12 NOTE — H&P
[facility] - Observation [address] [place] [address] Medicine  History & Physical    Patient Name: Suyapa Connelly  MRN: [id number]  Patient Class: OP- Observation  Admission Date: 6/11/2024  Attending Physician: Frances Bee MD   Primary Care Provider: Magen Christensen MD         Patient information was obtained from spouse/SO, past medical records, and ER records.     Subjective:     Principal Problem:Postconcussive syndrome    Chief Complaint:   Chief Complaint   Patient presents with    Headache     Pt states she has a headache, Hx of altered mental status and encephalopathy, recently admitted for same. Oriented to self at this time. Per , Pt fell out of wheelchair tonight after bath, +head trauma, +LOC.  states her nose starting bleeding, no bleeding at this time. Dialysis Mon, Wed, Fri.         HPI: Suyapa Connelly is a 58 y.o. male with PMHx of ESRD on HD MFW, b/l AKAs, CAD s/p CABG, afib on eliquis, HTN presenting to [facility] ED with AMS after hitting her hear. Per , patient was at her optimal mental baseline the last few days before she fell forward in her wheelchair when reaching for something and hitting her head on the ground. She was unconscious for about 15 minutes after this and has continued to be encephalopathic since then.  reports she was doing better than she has in about one year just prior to this. She is normally unaware of time (month, year) at baseline but is able to name her self, her , and place. She is WC bound at baseline. She was unable to feed herself for the past year but on June 9th was able to feed herself for the first time. She did not seem unwell in any way prior to this event. She was reaching forward and hit her nose on the floor. She did not seem to be fainting leading to the fall. Unable to obtain history for patient 2/2 encephalopathy. She follows commands and opens her eyes to voice and moves all extremities spontaneously.     She was just admitted to  "this hospital for AMS 5/28- 6/3.   Per discharge summary:  "Suyapa Connelly was placed in  observation for workup of encephalopathy. Patient continues to be confused and minimally engaged. She is able to follow commands with prompting. UA infectious, reflex culture pending. Start empiric rocephin - cultures did not isolate an organism in predominance. EEG obtained: "No epileptiform discharges, periodic discharges, lateralized rhythmic delta activity or electrographic seizures were seen. IMPRESSION: "Abnormal EEG due to a mild to moderate generalized cerebral dysfunction with no electrographic seizures or indications of seizure tendency."  MRI brain without acute changes, revealed "Overall degree of parenchymal volume loss has notably progressed when compared to the 05/06/2018 brain MRI. Neuropsychiatric consultation could be considered, as warranted clinically."     Medically ready to be discharged, discussed with  via phone call who initially wished to pursue NH placement. Discharge complicated as patient's family stopped answering the phone or all available numbers were out of service. APS report made per . Family came to bedside a few days later requesting to take the patient home - have long term plans to place her at [facility]. Per family patient's mental status had greatly improved since admission."    ED: AFVSS. Labs and imaging in ED with hyponatremia 128. Bicarb 18. Cr and BUN elevated with known ESRD. LFTS chronically elevated (T bili, ALP). Anemia at baseline. LA 0.86. Phos 6.9.  CTH with some limitations but with chronic change without evidence of superimposed acute intracranial process. CT maxillofacial without acute fracture. CT cervical spine without fracture deformity. XR hips without fracture or dislocation. Admitted to  for encephalopathy, likely post concussive in nature.      Past Medical History:   Diagnosis Date    Anxiety     Chronic pain syndrome     CKD (chronic " kidney disease), stage III     Depression     Diabetes mellitus, type 2     GERD (gastroesophageal reflux disease)     Hyperemesis 03/23/2021    Hypokalemia 03/23/2021    Infection of below knee amputation stump 03/12/2022    Osteomyelitis     Osteomyelitis of left foot 04/30/2021    Ulcer of left foot     Vaginal delivery     x1       Past Surgical History:   Procedure Laterality Date    ABOVE-KNEE AMPUTATION Left 5/18/2021    Procedure: AMPUTATION, ABOVE KNEE;  Surgeon: Teddy Huber MD;  Location: [facility] OR [address] [place] [address];  Service: Vascular;  Laterality: Left;    ABOVE-KNEE AMPUTATION Right 3/18/2022    Procedure: AMPUTATION, ABOVE KNEE;  Surgeon: DAYNE Florez II, MD;  [facility];  Service: Vascular;  Laterality: Right;    Angiogram - Right Extremity Right 7/9/15    angiogram-left leg  10/6/15    ANGIOGRAPHY OF LOWER EXTREMITY Left 4/29/2021    Procedure: ANGIOGRAM, LOWER EXTREMITY;  Surgeon: Teddy Huber MD;  Location: [facility] OR [address] [place] [address];  Service: Vascular;  Laterality: Left;    BELOW KNEE AMPUTATION OF LOWER EXTREMITY Right 12/28/2021    Procedure: AMPUTATION, BELOW KNEE;  Surgeon: Kaitlyn Rojas MD;  Location: [facility] OR;  Service: General;  Laterality: Right;    CATHETERIZATION OF BOTH LEFT AND RIGHT HEART N/A 12/18/2019    Procedure: CATHETERIZATION, HEART, BOTH LEFT AND RIGHT;  Surgeon: Que Fernando III, MD;  Location: [facility] CATH LAB;  Service: Cardiology;  Laterality: N/A;    CORONARY ANGIOGRAPHY N/A 12/18/2019    Procedure: ANGIOGRAM, CORONARY ARTERY;  Surgeon: Que Fernando III, MD;  Location: [facility] CATH LAB;  Service: Cardiology;  Laterality: N/A;    CORONARY ANGIOGRAPHY INCLUDING BYPASS GRAFTS WITH CATHETERIZATION OF LEFT HEART N/A 7/28/2020    Procedure: ANGIOGRAM, CORONARY, INCLUDING BYPASS GRAFT, WITH LEFT HEART CATHETERIZATION, 9 am;  Surgeon: Rachel Easley MD;  Location: [facility] CATH LAB;  Service: Cardiology;  Laterality: N/A;    CORONARY ARTERY BYPASS GRAFT  (CABG) N/A 1/14/2020    Procedure: CORONARY ARTERY BYPASS GRAFT (CABG) x 1     Off Pump;  Surgeon: Huang Altamirano MD;  Location: [facility] OR [address] [place] [address];  Service: Cardiovascular;  Laterality: N/A;    CREATION OF FEMORAL-TIBIAL ARTERY BYPASS Left 4/29/2021    Procedure: CREATION, BYPASS, ARTERIAL, FEMORAL TO ANTERIOR TIBIAL;  Surgeon: Teddy Huber MD;  [facility];  Service: Vascular;  Laterality: Left;    CREATION OF FEMOROPOPLITEAL ARTERIAL BYPASS USING GRAFT Left 8/18/2020    Procedure: CREATION, BYPASS, ARTERIAL, FEMORAL TO POPLITEAL, USING GRAFT, LEFT LOWER EXTREMITY;  Surgeon: Teddy Huber MD;  Location: [facility] OR;  Service: Vascular;  Laterality: Left;  REQUEST 7:15 A.M. START----COVID NEGATIVE ON 8/17 1ST CASE STARTE PER LEANA ON 8/7/2020 @ 942AM-  RN PREOP 8/12/2020   T/S-----CLEARED BY CARDS-------PENDING INSURANCE    DEBRIDEMENT OF FOOT Left 9/8/2020    Procedure: DEBRIDEMENT, FOOT;  Surgeon: Rosio Mayes DPM;  Location: [facility] OR;  Service: Podiatry;  Laterality: Left;  request neoxx .   RN Pre Op 9-4-2020, Covid negative on 9/5/20. C A    DEBRIDEMENT OF FOOT  3/4/2021    Procedure: DEBRIDEMENT, FOOT;  Surgeon: Teddy Huber MD;  Location: [facility] OR;  Service: Vascular;;    DEBRIDEMENT OF FOOT Left 3/9/2021    Procedure: DEBRIDEMENT, FOOT, bone biopsy;  Surgeon: Rosio Mayes DPM;  Location: [facility] OR;  Service: Podiatry;  Laterality: Left;  Request neoxx---COVID IN AM  REQUESTING NOON START  RN Phone Pre op.On Blood thinners Plavix and Eliquis.  Covid am of surgery. C A    DEBRIDEMENT OF FOOT Left 5/4/2021    Procedure: DEBRIDEMENT, FOOT;  Surgeon: Farooq Morley DPM;  [facility];  Service: Podiatry;  Laterality: Left;    INSERTION OF TUNNELED CENTRAL VENOUS HEMODIALYSIS CATHETER N/A 1/27/2020    Procedure: Insertion, Catheter, Central Venous, Hemodialysis;  Surgeon: ESTEBAN Gomez III, MD;  Location: [facility] CATH LAB;  Service: Peripheral Vascular;   Laterality: N/A;    INSERTION OF TUNNELED CENTRAL VENOUS HEMODIALYSIS CATHETER  5/10/2023    Procedure: Insertion, Catheter, Central Venous, Hemodialysis;  Surgeon: Romulo Queen MD;  Location: [facility] CATH LAB;  Service: Cardiology;;    PERCUTANEOUS TRANSLUMINAL ANGIOPLASTY N/A 3/4/2021    Procedure: PTA (ANGIOPLASTY, PERCUTANEOUS, TRANSLUMINAL);  Surgeon: Teddy Huber MD;  Location: [facility] OR;  Service: Vascular;  Laterality: N/A;    REMOVAL OF ARTERIOVENOUS GRAFT Left 5/27/2021    Procedure: REMOVAL, GRAFT, LEFT LOWER EXTREMITY, WOUND EXPLORATION;  Surgeon: Teddy Huber MD;  Location: [facility] OR 2ND FLR;  Service: Vascular;  Laterality: Left;    REMOVAL OF NAIL OF DIGIT Left 3/9/2021    Procedure: REMOVAL, NAIL, DIGIT;  Surgeon: Rosio Mayes DPM;  Location: [facility] OR;  Service: Podiatry;  Laterality: Left;    RIGHT HEART CATHETERIZATION Right 5/10/2023    Procedure: INSERTION, CATHETER, RIGHT HEART;  Surgeon: Romulo Queen MD;  Location: [facility] CATH LAB;  Service: Cardiology;  Laterality: Right;    THROMBECTOMY Left 3/4/2021    Procedure: THROMBECTOMY, LEFT LOWER EXTREMITY BYPASS GRAFT, ANGIOGRAM, POSSIBLE INTERVENTION, POSSIBLE LEFT LOWER EXTREMITY BYPASS;  Surgeon: Teddy Huber MD;  Location: [facility] OR;  Service: Vascular;  Laterality: Left;    THROMBECTOMY Left 4/29/2021    Procedure: GRAFT THROMBECTOMY, LEFT LOWER EXTREMITY;  Surgeon: Teddy Huber MD;  Location: [facility] OR 2ND FLR;  Service: Vascular;  Laterality: Left;  14.5 min  1179.85 mGy  341.01 Gycm2  240 ml dye    THROMBECTOMY  10/22/2021    Procedure: THROMBECTOMY;  Surgeon: Saad Arenas MD;  Location: [facility] CATH LAB/EP;  Service: Cardiology;;       Review of patient's allergies indicates:   Allergen Reactions    Ciprofloxacin Itching    Iodine      Kidney injury    Pcn [penicillins]      Rash; tolerated ceftriaxone on 1/13/20       No current facility-administered medications on file prior to encounter.     Current  "Outpatient Medications on File Prior to Encounter   Medication Sig    acetaminophen (TYLENOL) 500 MG tablet Take 500 mg by mouth every 6 (six) hours as needed for Pain.    allopurinoL (ZYLOPRIM) 100 MG tablet Take 0.5 tablets (50 mg total) by mouth every other day.    apixaban (ELIQUIS) 5 mg Tab Take 1 tablet (5 mg total) by mouth 2 (two) times daily.    atorvastatin (LIPITOR) 80 MG tablet Take 1 tablet (80 mg total) by mouth once daily.    blood sugar diagnostic Strp Use to check blood glucose 2 (two) times a day.    calcitRIOL (ROCALTROL) 0.5 MCG Cap Take 1 capsule (0.5 mcg total) by mouth once daily.    calcium acetate,phosphat bind, (PHOSLO) 667 mg capsule Take 1 capsule (667 mg total) by mouth 3 (three) times daily with meals.    carvediloL (COREG) 6.25 MG tablet Take 1 tablet (6.25 mg total) by mouth 2 (two) times daily.    clopidogreL (PLAVIX) 75 mg tablet Take 1 tablet (75 mg total) by mouth once daily.    famotidine (PEPCID) 20 MG tablet Take 1 tablet (20 mg total) by mouth 2 (two) times daily.    hydrALAZINE (APRESOLINE) 50 MG tablet Take 1 tablet (50 mg total) by mouth 3 (three) times daily.    insulin aspart, niacinamide, (FIASP FLEXTOUCH U-100 INSULIN) 100 unit/mL (3 mL) InPn Inject 3 Units into the skin 3 (three) times daily with meals.    lancets [facility] To check BG 3 times daily, to use with insurance preferred meter    multivitamin (ONE DAILY MULTIVITAMIN) per tablet Take 1 tablet by mouth once daily.    pen needle, diabetic 32 gauge x 5/32" Ndle 1 Units by Misc.(Non-Drug; Combo Route) route before meals as needed (SSI).    sertraline (ZOLOFT) 100 MG tablet Take 1 tablet (100 mg total) by mouth once daily.    sodium bicarbonate 650 MG tablet TAKE 1 TABLET(650 MG) BY MOUTH THREE TIMES DAILY    traZODone (DESYREL) 50 MG tablet Take 0.5 tablets (25 mg total) by mouth every evening.    wound dressings (TRIAD WOUND DRESSING) Pste Apply 2 g topically once daily.    [DISCONTINUED] lancing device Misc 1 Device " by Misc.(Non-Drug; Combo Route) route 2 (two) times daily with meals.     Family History       Problem Relation (Age of Onset)    Diabetes Mother, Father, Paternal Grandmother    Heart disease Maternal Grandmother    No Known Problems Maternal Grandfather, Paternal Grandfather          Tobacco Use    Smoking status: Former    Smokeless tobacco: Never   Substance and Sexual Activity    Alcohol use: No    Drug use: Yes     Types: Marijuana     Comment: occassional    Sexual activity: Yes     Partners: Male     Review of Systems   Unable to perform ROS: Mental status change     Objective:     Vital Signs (Most Recent):  Temp: 97.6 °F (36.4 °C) (06/12/24 0608)  Pulse: 81 (06/12/24 0608)  Resp: 18 (06/12/24 0608)  BP: (!) 104/53 (06/12/24 0608)  SpO2: (!) 94 % (06/12/24 0608) Vital Signs (24h Range):  Temp:  [97.6 °F (36.4 °C)-98.2 °F (36.8 °C)] 97.6 °F (36.4 °C)  Pulse:  [80-83] 81  Resp:  [10-18] 18  SpO2:  [94 %-100 %] 94 %  BP: (104-120)/(53-71) 104/53     Weight: 74.5 kg (164 lb 3.9 oz)  Body mass index is 50.12 kg/m².     Physical Exam  Vitals and nursing note reviewed.   Constitutional:       General: She is not in acute distress.     Appearance: She is obese.      Comments: Awake and alert but disoriented   HENT:      Head: Normocephalic and atraumatic.      Nose: Nose normal.      Mouth/Throat:      Pharynx: No oropharyngeal exudate.   Eyes:      Extraocular Movements: Extraocular movements intact.      Conjunctiva/sclera: Conjunctivae normal.   Cardiovascular:      Rate and Rhythm: Normal rate and regular rhythm.      Heart sounds: Normal heart sounds.   Pulmonary:      Effort: Pulmonary effort is normal. No respiratory distress.   Abdominal:      General: Bowel sounds are normal. There is no distension.      Palpations: Abdomen is soft.      Tenderness: There is no abdominal tenderness.   Musculoskeletal:         General: Normal range of motion.      Cervical back: Normal range of motion.      Comments: BL  AKA. Non focally TTP. Moves spontaneously BL   Skin:     General: Skin is warm and dry.   Neurological:      Mental Status: She is alert.      Comments: Unable to answer questions. Follows commands. GOODRICH spontaneously.    Psychiatric:         Mood and Affect: Mood normal.         Behavior: Behavior normal.                Significant Labs: All pertinent labs within the past 24 hours have been reviewed.  CBC:   Recent Labs   Lab 06/11/24  2240   WBC 4.54   HGB 10.5*   HCT 31.2*        CMP:   Recent Labs   Lab 06/12/24  0147   *   K 5.4*   CL 95   CO2 18*   GLU 89   BUN 79*   CREATININE 5.8*   CALCIUM 8.6*   PROT 7.6   ALBUMIN 2.9*   BILITOT 1.3*   ALKPHOS 155*   AST 25   ALT 14   ANIONGAP 15     Magnesium:   Recent Labs   Lab 06/12/24  0147   MG 2.0     TSH:   Recent Labs   Lab 05/28/24  2149   TSH 3.133       Significant Imaging: I have reviewed all pertinent imaging results/findings within the past 24 hours.  X-Ray Hip 2 or 3 views Right with Pelvis when performed  Narrative: EXAMINATION:  XR HIP WITH PELVIS WHEN PERFORMED 2 OR 3 VIEWS RIGHT    CLINICAL HISTORY:  R hip pain;    TECHNIQUE:  AP view of the pelvis and frog leg lateral view of the right hip were performed.    COMPARISON:  CT of the abdomen and pelvis from 05/23/2023.    FINDINGS:  There is diffuse osteopenia.  There is no evidence of an acute fracture or dislocation of the pelvis or the right hip.  The femoral heads are well seated in the acetabula.  There are surgical clips overlying the left hip.  There are vascular calcifications.  There are multiple calcifications overlying the pelvis which may reflect uterine fibroids.  Impression: No acute fracture or dislocation.    Electronically signed by: Rizwan Valdez  Date:    06/12/2024  Time:    01:05  X-Ray Chest AP Portable  Narrative: EXAMINATION:  XR CHEST AP PORTABLE    CLINICAL HISTORY:  Altered mental status, unspecified    TECHNIQUE:  Single frontal view of the chest was  performed.    COMPARISON:  Chest radiograph May 7, 2024    FINDINGS:  Single portable chest radiograph is submitted.  Postoperative cardiothoracic change and left-sided central venous catheter again noted.  ET tube and enteric tube have been removed in the interim, additional objects overlie the patient.    The heart size appears enlarged however the appearance of the heart size and cardiomediastinal silhouette appears stable when accounting for position and technique and depth of inspiration.  Aortic atherosclerotic change noted.    There is central pulmonary vascular prominence.  Bilateral pattern of accentuation of interstitial markings may relate to a pattern of interstitial infiltrate/edema.  Suspected mild patchy alveolar infiltrates particularly about the left perihilar region and at the lung bases bilaterally.    There is no evidence for significant pleural effusion and no evidence for pneumothorax.    The osseous structures demonstrate chronic change.  Impression: Radiographic findings as above.    Electronically signed by: Kp Oliveira  Date:    06/12/2024  Time:    00:42     Assessment/Plan:     * Postconcussive syndrome  Per , patient was at her optimal mental baseline the last few days before she fell forward in her wheelchair when reaching for something and hitting her head on the ground. She was unconsciousness for about 15 minutes after this and has continued to be encephalopathic since then. Do not suspect syncope or other cause of mental status change at this time. Seems most likely post concussive s/p head trauma given timeline from  and normal mental status just prior to this.     Labs ad imaging in ED with hyponatremia 128. Bicarb 18. Cr and BUN elevated with known ESRD. LFTS chronically elevated (T bili, ALP). Anemia at baseline. LA 0.86. Phos 6.9.  CTH with some limitations but with chronic change without evidence of superimposed acute intracranial process. CT maxillofacial  without acute fracture. CT cervical spine without fracture deformity. XR hips without fracture or dislocation.   - UA pending. VBG and ammonia pending.  - Monitor neuro checks q4h  - Aspiration precautions. Fall precautions.   - Delirium precautions  - Bedside swallow prior to any PO  - Further work up if continued to be encephalopathic   - At baseline,  states she is able to name person, place and just recently has been able to feed herself (after one year of needing assistance)    ESRD (end stage renal disease)  -HD MWF outpatient  -Nephrology consulted for HD    Metabolic acidosis  Likely 2/2 ESRD. On sodium bicarb supplementation. Nephrology is consulted.    Debility  Patient with Chronic debility due to other reduced mobility and wheelchair bound, bilateral AKA . Latest AMPAC and GEMS scores have not been reviewed. Evaluation for etiology is complete. Plan includes progressive mobility protocol initated and fall precautions in place.    Hyperkalemia  This patient has hyperkalemia which is uncontrolled. We will monitor for arrhythmias with EKG or continuous telemetry. We will treat the hyperkalemia with Potassium Binders and HD . The likely etiology of the hyperkalemia is ESRD.  The patients latest potassium has been reviewed and the results are listed below  Recent Labs   Lab 06/12/24  0147   K 5.4*       Chronic combined systolic and diastolic heart failure  - Volume management with HD  - Continue beta blocker    Results for orders placed during the hospital encounter of 05/07/24    Echo    Interpretation Summary    Left Ventricle: The left ventricle is mildly dilated. Ventricular mass is normal. Normal wall thickness. Severe global hypokinesis present. There is severely reduced systolic function. Ejection fraction by visual approximation is 15%. There is diastolic dysfunction.    Right Ventricle: Severe right ventricular enlargement. Wall thickness is normal. Right ventricle wall motion has global  hypokinesis. Systolic function is mildly reduced.    Left Atrium: Left atrium is severely dilated. There is an atrial septal aneurysm.    Right Atrium: Right atrium is dilated.    Aortic Valve: There is mild aortic regurgitation.    Mitral Valve: There is mild regurgitation.    Tricuspid Valve: There is annular dilation present. There is normal leaflet mobility. There is severe functional regurgitation.    Pulmonary Artery: The estimated pulmonary artery systolic pressure is at least 24 mmHg. PASP is likely under-estimated in the setting of severe tricuspid regurgitation.    IVC/SVC: Central venous pressure of at least 8 mmHg.    No vegetation seen.     Hyponatremia  Patient has hyponatremia which is uncontrolled,We will aim to correct the sodium by 4-6mEq in 24 hours. We will monitor sodium Daily. The hyponatremia is due to renal insufficiency. We will obtain the following studies: We will treat the hyponatremia with Hemodialysis. The patient's sodium results have been reviewed and are listed below.  Recent Labs   Lab 06/12/24  0147   *       Paroxysmal atrial fibrillation  -Continue Coreg and Eliquis    Anemia due to chronic kidney disease, on chronic dialysis  Patient's anemia is currently controlled. Has not received any PRBCs to date. Etiology likely d/t chronic disease due to ESRD  Current CBC reviewed-   Lab Results   Component Value Date    HGB 10.5 (L) 06/11/2024    HCT 31.2 (L) 06/11/2024     Monitor serial CBC and transfuse if patient becomes hemodynamically unstable, symptomatic or H/H drops below 7/21.    CAD (coronary artery disease)  Patient with known CAD s/p CABG, which is controlled Will continue Plavix and Statin and monitor for S/Sx of angina/ACS. Continue to monitor on telemetry.     Type 2 diabetes mellitus with hyperglycemia, with long-term current use of insulin  Patient's FSGs are controlled on current medication regimen.  Last A1c reviewed-   Lab Results   Component Value Date     "HGBA1C 8.0 (H) 04/24/2024     Most recent fingerstick glucose reviewed- No results for input(s): "POCTGLUCOSE" in the last 24 hours.  Current correctional scale  Low  Maintain anti-hyperglycemic dose as follows-   Antihyperglycemics (From admission, onward)      Start     Stop Route Frequency Ordered    06/12/24 0555  insulin aspart U-100 pen 0-5 Units         -- SubQ Before meals & nightly PRN 06/12/24 0455        Hold Oral hypoglycemics while patient is in the hospital.     Peripheral vascular disease  -Continue Plavix and statin       VTE Risk Mitigation (From admission, onward)           Ordered     apixaban tablet 5 mg  2 times daily         06/12/24 0626     IP VTE HIGH RISK PATIENT  Once         06/12/24 0455     Place sequential compression device  Until discontinued         06/12/24 0455     Reason for No Pharmacological VTE Prophylaxis  Once        Question:  Reasons:  Answer:  Already adequately anticoagulated on oral Anticoagulants    06/12/24 0455                         On 06/12/2024, patient should be placed in hospital observation services under my care in collaboration with DO. Leticia Martinez PA-C  Department of Hospital Medicine  [facility] - Observation 11H          "

## 2024-06-12 NOTE — ASSESSMENT & PLAN NOTE
"Patient's FSGs are controlled on current medication regimen.  Last A1c reviewed-   Lab Results   Component Value Date    HGBA1C 8.0 (H) 04/24/2024     Most recent fingerstick glucose reviewed- No results for input(s): "POCTGLUCOSE" in the last 24 hours.  Current correctional scale  Low  Maintain anti-hyperglycemic dose as follows-   Antihyperglycemics (From admission, onward)      Start     Stop Route Frequency Ordered    06/12/24 0555  insulin aspart U-100 pen 0-5 Units         -- SubQ Before meals & nightly PRN 06/12/24 0455        Hold Oral hypoglycemics while patient is in the hospital.   "

## 2024-06-12 NOTE — NURSING
Nurses Note -- 4 Eyes      6/12/2024   7:30 AM      Skin assessed during: Admit      [] No Altered Skin Integrity Present    []Prevention Measures Documented      [x] Yes- Altered Skin Integrity Present or Discovered   [x] LDA Added if Not in Epic (Describe Wound)   [x] New Altered Skin Integrity was Present on Admit and Documented in LDA   [x] Wound Image Taken    Wound Care Consulted? Yes    Attending Nurse:  Jenna Lin RN/Staff Member:   Gwendolyn

## 2024-06-12 NOTE — ED NOTES
Telemetry Verification   Patient placed on Telemetry Box  Verified with War Room  Box #    Monitor Tech    Rate 83   rhythm nsr

## 2024-06-12 NOTE — PROVIDER PROGRESS NOTES - EMERGENCY DEPT.
Encounter Date: 6/11/2024    ED Physician Progress Notes        Received sign-out from previous team plan was follow-up imaging and reassess patient.  Patient still encephalopathic after fall closed head injury.  Concern for postconcussive syndrome.  Will plan admission for observation further workup as indicated.  Family understood agreed plan.

## 2024-06-13 VITALS
TEMPERATURE: 98 F | RESPIRATION RATE: 18 BRPM | DIASTOLIC BLOOD PRESSURE: 86 MMHG | SYSTOLIC BLOOD PRESSURE: 145 MMHG | OXYGEN SATURATION: 95 % | BODY MASS INDEX: 38.01 KG/M2 | HEART RATE: 85 BPM | WEIGHT: 164.25 LBS | HEIGHT: 55 IN

## 2024-06-13 LAB
ALBUMIN SERPL BCP-MCNC: 2.9 G/DL (ref 3.5–5.2)
ALP SERPL-CCNC: 186 U/L (ref 55–135)
ALT SERPL W/O P-5'-P-CCNC: 14 U/L (ref 10–44)
ANION GAP SERPL CALC-SCNC: 12 MMOL/L (ref 8–16)
AST SERPL-CCNC: 22 U/L (ref 10–40)
BASOPHILS # BLD AUTO: 0.07 K/UL (ref 0–0.2)
BASOPHILS NFR BLD: 1.8 % (ref 0–1.9)
BILIRUB SERPL-MCNC: 1 MG/DL (ref 0.1–1)
BUN SERPL-MCNC: 49 MG/DL (ref 6–20)
CALCIUM SERPL-MCNC: 9 MG/DL (ref 8.7–10.5)
CHLORIDE SERPL-SCNC: 94 MMOL/L (ref 95–110)
CO2 SERPL-SCNC: 23 MMOL/L (ref 23–29)
CREAT SERPL-MCNC: 4.4 MG/DL (ref 0.5–1.4)
DIFFERENTIAL METHOD BLD: ABNORMAL
EOSINOPHIL # BLD AUTO: 0.1 K/UL (ref 0–0.5)
EOSINOPHIL NFR BLD: 1.8 % (ref 0–8)
ERYTHROCYTE [DISTWIDTH] IN BLOOD BY AUTOMATED COUNT: 17.5 % (ref 11.5–14.5)
EST. GFR  (NO RACE VARIABLE): 11.1 ML/MIN/1.73 M^2
GLUCOSE SERPL-MCNC: 203 MG/DL (ref 70–110)
HCT VFR BLD AUTO: 30.4 % (ref 37–48.5)
HGB BLD-MCNC: 10.1 G/DL (ref 12–16)
IMM GRANULOCYTES # BLD AUTO: 0.01 K/UL (ref 0–0.04)
IMM GRANULOCYTES NFR BLD AUTO: 0.3 % (ref 0–0.5)
LYMPHOCYTES # BLD AUTO: 0.5 K/UL (ref 1–4.8)
LYMPHOCYTES NFR BLD: 13.7 % (ref 18–48)
MAGNESIUM SERPL-MCNC: 2 MG/DL (ref 1.6–2.6)
MCH RBC QN AUTO: 32.6 PG (ref 27–31)
MCHC RBC AUTO-ENTMCNC: 33.2 G/DL (ref 32–36)
MCV RBC AUTO: 98 FL (ref 82–98)
MONOCYTES # BLD AUTO: 0.7 K/UL (ref 0.3–1)
MONOCYTES NFR BLD: 17.6 % (ref 4–15)
NEUTROPHILS # BLD AUTO: 2.5 K/UL (ref 1.8–7.7)
NEUTROPHILS NFR BLD: 64.8 % (ref 38–73)
NRBC BLD-RTO: 0 /100 WBC
PHOSPHATE SERPL-MCNC: 4.7 MG/DL (ref 2.7–4.5)
PLATELET # BLD AUTO: 215 K/UL (ref 150–450)
PMV BLD AUTO: 10.9 FL (ref 9.2–12.9)
POCT GLUCOSE: 199 MG/DL (ref 70–110)
POCT GLUCOSE: 205 MG/DL (ref 70–110)
POTASSIUM SERPL-SCNC: 4.4 MMOL/L (ref 3.5–5.1)
PROT SERPL-MCNC: 8.2 G/DL (ref 6–8.4)
RBC # BLD AUTO: 3.1 M/UL (ref 4–5.4)
SODIUM SERPL-SCNC: 129 MMOL/L (ref 136–145)
WBC # BLD AUTO: 3.8 K/UL (ref 3.9–12.7)

## 2024-06-13 PROCEDURE — 36415 COLL VENOUS BLD VENIPUNCTURE: CPT | Mod: HCNC

## 2024-06-13 PROCEDURE — 84100 ASSAY OF PHOSPHORUS: CPT | Mod: HCNC

## 2024-06-13 PROCEDURE — 99214 OFFICE O/P EST MOD 30 MIN: CPT | Mod: 25,HCNC,, | Performed by: NURSE PRACTITIONER

## 2024-06-13 PROCEDURE — 63600175 PHARM REV CODE 636 W HCPCS: Mod: HCNC

## 2024-06-13 PROCEDURE — 80053 COMPREHEN METABOLIC PANEL: CPT | Mod: HCNC

## 2024-06-13 PROCEDURE — 25000003 PHARM REV CODE 250: Mod: HCNC

## 2024-06-13 PROCEDURE — 92610 EVALUATE SWALLOWING FUNCTION: CPT | Mod: HCNC

## 2024-06-13 PROCEDURE — 83735 ASSAY OF MAGNESIUM: CPT | Mod: HCNC

## 2024-06-13 PROCEDURE — G0378 HOSPITAL OBSERVATION PER HR: HCPCS | Mod: HCNC

## 2024-06-13 PROCEDURE — 97535 SELF CARE MNGMENT TRAINING: CPT | Mod: HCNC

## 2024-06-13 PROCEDURE — 96372 THER/PROPH/DIAG INJ SC/IM: CPT

## 2024-06-13 PROCEDURE — 85025 COMPLETE CBC W/AUTO DIFF WBC: CPT | Mod: HCNC

## 2024-06-13 RX ORDER — SODIUM CHLORIDE 9 MG/ML
INJECTION, SOLUTION INTRAVENOUS ONCE
Status: DISCONTINUED | OUTPATIENT
Start: 2024-06-14 | End: 2024-06-14 | Stop reason: HOSPADM

## 2024-06-13 RX ADMIN — CLOPIDOGREL BISULFATE 75 MG: 75 TABLET ORAL at 08:06

## 2024-06-13 RX ADMIN — CALCIUM ACETATE 667 MG: 667 CAPSULE ORAL at 07:06

## 2024-06-13 RX ADMIN — CALCIUM ACETATE 667 MG: 667 CAPSULE ORAL at 04:06

## 2024-06-13 RX ADMIN — FAMOTIDINE 20 MG: 20 TABLET ORAL at 09:06

## 2024-06-13 RX ADMIN — CALCITRIOL 0.5 MCG: 0.5 CAPSULE, LIQUID FILLED ORAL at 08:06

## 2024-06-13 RX ADMIN — ATORVASTATIN CALCIUM 80 MG: 40 TABLET, FILM COATED ORAL at 08:06

## 2024-06-13 RX ADMIN — CALCIUM ACETATE 667 MG: 667 CAPSULE ORAL at 12:06

## 2024-06-13 RX ADMIN — APIXABAN 5 MG: 5 TABLET, FILM COATED ORAL at 09:06

## 2024-06-13 RX ADMIN — SODIUM BICARBONATE 650 MG TABLET 650 MG: at 08:06

## 2024-06-13 RX ADMIN — INSULIN ASPART 2 UNITS: 100 INJECTION, SOLUTION INTRAVENOUS; SUBCUTANEOUS at 01:06

## 2024-06-13 RX ADMIN — SODIUM BICARBONATE 650 MG TABLET 650 MG: at 10:06

## 2024-06-13 RX ADMIN — ALLOPURINOL 50 MG: 300 TABLET ORAL at 09:06

## 2024-06-13 RX ADMIN — SODIUM BICARBONATE 650 MG TABLET 650 MG: at 03:06

## 2024-06-13 RX ADMIN — SERTRALINE 100 MG: 100 TABLET, FILM COATED ORAL at 09:06

## 2024-06-13 RX ADMIN — APIXABAN 5 MG: 5 TABLET, FILM COATED ORAL at 10:06

## 2024-06-13 NOTE — PT/OT/SLP EVAL
Speech Language Pathology Evaluation  Bedside Swallow    Patient Name:  Suyapa Connelly   MRN:  [id number]  Admitting Diagnosis: Postconcussive syndrome    Recommendations:                 General Recommendations:  Follow-up not indicated  Diet recommendations:  Regular Diet - IDDSI Level 7, Thin liquids - IDDSI Level 0   Aspiration Precautions: Standard aspiration precautions   General Precautions: Standard,  only feed when alert and awake.   Communication strategies:  none    Assessment:     Suyapa Connelly is a 58 y.o. female with an SLP diagnosis of functional oropharyngeal swallow for a regular diet and thin liquids.   History:     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    Osteomyelitis     Osteomyelitis of left foot 04/30/2021    Ulcer of left foot     Vaginal delivery     x1       Past Surgical History:   Procedure Laterality Date    ABOVE-KNEE AMPUTATION Left 5/18/2021    Procedure: AMPUTATION, ABOVE KNEE;  Surgeon: Teddy Huber MD;  Location: [address];  Service: Vascular;  Laterality: Left;    ABOVE-KNEE AMPUTATION Right 3/18/2022    Procedure: AMPUTATION, ABOVE KNEE;  Surgeon: DAYNE Florez II, MD;  Location: [facility] OR [address] [place] [address];  Service: Vascular;  Laterality: Right;    Angiogram - Right Extremity Right 7/9/15    angiogram-left leg  10/6/15    ANGIOGRAPHY OF LOWER EXTREMITY Left 4/29/2021    Procedure: ANGIOGRAM, LOWER EXTREMITY;  Surgeon: Teddy Huber MD;  Location: [address];  Service: Vascular;  Laterality: Left;    BELOW KNEE AMPUTATION OF LOWER EXTREMITY Right 12/28/2021    Procedure: AMPUTATION, BELOW KNEE;  Surgeon: Kaitlyn Rojas MD;  Location: [facility];  Service: General;  Laterality:  Right;    CATHETERIZATION OF BOTH LEFT AND RIGHT HEART N/A 12/18/2019    Procedure: CATHETERIZATION, HEART, BOTH LEFT AND RIGHT;  Surgeon: Que Fernando III, MD;  Location: [facility] CATH LAB;  Service: Cardiology;  Laterality: N/A;    CORONARY ANGIOGRAPHY N/A 12/18/2019    Procedure: ANGIOGRAM, CORONARY ARTERY;  Surgeon: Que Fernando III, MD;  Location: [facility] CATH LAB;  Service: Cardiology;  Laterality: N/A;    CORONARY ANGIOGRAPHY INCLUDING BYPASS GRAFTS WITH CATHETERIZATION OF LEFT HEART N/A 7/28/2020    Procedure: ANGIOGRAM, CORONARY, INCLUDING BYPASS GRAFT, WITH LEFT HEART CATHETERIZATION, 9 am;  Surgeon: Rachel Easley MD;  Location: [facility] CATH LAB;  Service: Cardiology;  Laterality: N/A;    CORONARY ARTERY BYPASS GRAFT (CABG) N/A 1/14/2020    Procedure: CORONARY ARTERY BYPASS GRAFT (CABG) x 1     Off Pump;  Surgeon: Huang Altamirano MD;  Location: [facility] OR [address] [place] [address];  Service: Cardiovascular;  Laterality: N/A;    CREATION OF FEMORAL-TIBIAL ARTERY BYPASS Left 4/29/2021    Procedure: CREATION, BYPASS, ARTERIAL, FEMORAL TO ANTERIOR TIBIAL;  Surgeon: Teddy Huber MD;  Location: [facility] OR [address] [place] [address];  Service: Vascular;  Laterality: Left;    CREATION OF FEMOROPOPLITEAL ARTERIAL BYPASS USING GRAFT Left 8/18/2020    Procedure: CREATION, BYPASS, ARTERIAL, FEMORAL TO POPLITEAL, USING GRAFT, LEFT LOWER EXTREMITY;  Surgeon: Teddy Huber MD;  Location: [facility];  Service: Vascular;  Laterality: Left;  REQUEST 7:15 A.M. START----COVID NEGATIVE ON 8/17  1ST CASE STARTE PER LEANA ON 8/7/2020 @ 942AM-LO  RN PREOP 8/12/2020   T/S-----CLEARED BY CARDS-------PENDING INSURANCE    DEBRIDEMENT OF FOOT Left 9/8/2020    Procedure: DEBRIDEMENT, FOOT;  Surgeon: Rosio Mayes DPM;  Location: [facility] OR;  Service: Podiatry;  Laterality: Left;  request neoxx .   RN Pre Op 9-4-2020, Covid negative on 9/5/20. C A    DEBRIDEMENT OF FOOT  3/4/2021    Procedure: DEBRIDEMENT, FOOT;  Surgeon: Teddy Huber MD;  Location:  [facility] OR;  Service: Vascular;;    DEBRIDEMENT OF FOOT Left 3/9/2021    Procedure: DEBRIDEMENT, FOOT, bone biopsy;  Surgeon: Rosio Mayes DPM;  Location: [facility] OR;  Service: Podiatry;  Laterality: Left;  Request neoxx---COVID IN AM  REQUESTING NOON START  RN Phone Pre op.On Blood thinners Plavix and Eliquis.  Covid am of surgery. C A    DEBRIDEMENT OF FOOT Left 5/4/2021    Procedure: DEBRIDEMENT, FOOT;  Surgeon: Farooq Morley DPM;  [facility];  Service: Podiatry;  Laterality: Left;    INSERTION OF TUNNELED CENTRAL VENOUS HEMODIALYSIS CATHETER N/A 1/27/2020    Procedure: Insertion, Catheter, Central Venous, Hemodialysis;  Surgeon: ESTEBAN Gomez III, MD;  Location: [facility] CATH LAB;  Service: Peripheral Vascular;  Laterality: N/A;    INSERTION OF TUNNELED CENTRAL VENOUS HEMODIALYSIS CATHETER  5/10/2023    Procedure: Insertion, Catheter, Central Venous, Hemodialysis;  Surgeon: Romulo Queen MD;  Location: [facility] CATH LAB;  Service: Cardiology;;    PERCUTANEOUS TRANSLUMINAL ANGIOPLASTY N/A 3/4/2021    Procedure: PTA (ANGIOPLASTY, PERCUTANEOUS, TRANSLUMINAL);  Surgeon: Teddy Huber MD;  Location: [facility] OR;  Service: Vascular;  Laterality: N/A;    REMOVAL OF ARTERIOVENOUS GRAFT Left 5/27/2021    Procedure: REMOVAL, GRAFT, LEFT LOWER EXTREMITY, WOUND EXPLORATION;  Surgeon: Teddy Huber MD;  [facility];  Service: Vascular;  Laterality: Left;    REMOVAL OF NAIL OF DIGIT Left 3/9/2021    Procedure: REMOVAL, NAIL, DIGIT;  Surgeon: Rosio Mayes DPM;  Location: [facility] OR;  Service: Podiatry;  Laterality: Left;    RIGHT HEART CATHETERIZATION Right 5/10/2023    Procedure: INSERTION, CATHETER, RIGHT HEART;  Surgeon: Romulo Queen MD;  Location: [facility] CATH LAB;  Service: Cardiology;  Laterality: Right;    THROMBECTOMY Left 3/4/2021    Procedure: THROMBECTOMY, LEFT LOWER EXTREMITY BYPASS GRAFT, ANGIOGRAM, POSSIBLE INTERVENTION, POSSIBLE LEFT LOWER EXTREMITY BYPASS;  Surgeon:  Teddy Huber MD;  Location: [facility] OR;  Service: Vascular;  Laterality: Left;    THROMBECTOMY Left 4/29/2021    Procedure: GRAFT THROMBECTOMY, LEFT LOWER EXTREMITY;  Surgeon: Teddy Huber MD;  Location: [facility] OR 2ND FLR;  Service: Vascular;  Laterality: Left;  14.5 min  1179.85 mGy  341.01 Gycm2  240 ml dye    THROMBECTOMY  10/22/2021    Procedure: THROMBECTOMY;  Surgeon: Saad Arenas MD;  Location: [facility] CATH LAB/EP;  Service: Cardiology;;       Prior Intubation HX:  none on file     Modified Barium Swallow: none on file     Chest X-Rays: There is central pulmonary vascular prominence.  Bilateral pattern of accentuation of interstitial markings may relate to a pattern of interstitial infiltrate/edema.  Suspected mild patchy alveolar infiltrates particularly about the left perihilar region and at the lung bases bilaterally. 6/11/2024     Prior diet: regular diet and thin liquids per patient.      Subjective     Pt seen resting in bed comfortably.   Pain/Comfort:  Pain Rating 1: 0/10    Respiratory Status: Room air    Objective:     Oral Musculature Evaluation  Oral Musculature: WFL  Dentition: present and adequate  Secretion Management: adequate  Mucosal Quality: good  Mandibular Strength and Mobility: WFL  Oral Labial Strength and Mobility: WFL  Lingual Strength and Mobility: WFL    Bedside Swallow Eval:   Consistencies Assessed:  Thin liquids multiple straw ( more than 8)   Solids 1 cracker       Oral Phase:   WFL    Pharyngeal Phase:   No overt signs of aspiration across all trials.    Compensatory Strategies  None    Treatment: Pt initially refusing PO intake, though eventually accepting minimal trials. Pt appearing lethargic throughout the session, though able to sustain alertness for PO intake. Discussed aspiration precautions with pt who verbalized understanding.     Goals:   Multidisciplinary Problems       SLP Goals       Not on file                    Plan:     Plan of Care reviewed with:   patient   SLP Follow-Up:  No       Discharge recommendations:  No Therapy Indicated   Barriers to Discharge:  None    Time Tracking:     SLP Treatment Date:   06/13/24  Speech Start Time:  0920  Speech Stop Time:  0937     Speech Total Time (min):  17 min    Billable Minutes: Eval Swallow and Oral Function 9 and Self Care/Home Management Training 8    06/13/2024

## 2024-06-13 NOTE — PLAN OF CARE
[facility]  Discharge Final Note    Primary Care Provider: Feliciano Nguyen III PASahilC    Expected Discharge Date: 6/13/2024    Pt discharged with [facility] provided by [facility]. Home health is requesting pt/pt  contact them to arrange for admission visit due to 's refusal to allow them to come in the past. Team notified via secure chat and in agreement. Contact information placed in the AVS.   Feliciano Bridges RN,BSN        Final Discharge Note (most recent)       Final Note - 06/13/24 1406          Final Note    Assessment Type Final Discharge Note     Anticipated Discharge Disposition Home-Health Care [facility]     Hospital Resources/Appts/Education Provided Provided patient/caregiver with written discharge plan information;Provided education on problems/symptoms using teachback;Appointments scheduled and added to AVS        Post-Acute Status    Post-Acute Authorization Home Health     Home Health Status Set-up Complete/Auth obtained     Discharge Delays None known at this time                     Important Message from Medicare             Contact Info       [facility]    [phone]       Next Steps: Follow up    Instructions: Please contact [facility] for your initial admission visit with the number provided.

## 2024-06-13 NOTE — PROGRESS NOTES
[facility]  Nephrology  Progress Note    Patient Name: Suyapa Connelly  MRN: [id number]  Admission Date: 6/11/2024  Hospital Length of Stay: 0 days  Attending Provider: Frances Hutchinson MD   Primary Care Physician: Feliciano Nguyen III PA-C  Principal Problem:Postconcussive syndrome    Subjective:     Interval History: HD yesterday, tolerated well. Net UF 2L    Review of patient's allergies indicates:   Allergen Reactions    Ciprofloxacin Itching    Iodine      Kidney injury    Pcn [penicillins]      Rash; tolerated ceftriaxone on 1/13/20     Current Facility-Administered Medications   Medication Frequency    albuterol-ipratropium 2.5 mg-0.5 mg/3 mL nebulizer solution 3 mL Q4H PRN    allopurinol split tablet 50 mg Every other day    apixaban tablet 5 mg BID    atorvastatin tablet 80 mg Daily    bisacodyL suppository 10 mg Daily PRN    calcitRIOL capsule 0.5 mcg Daily    calcium acetate(phosphat bind) capsule 667 mg TID WM    clopidogreL tablet 75 mg Daily    dextrose 10% bolus 125 mL 125 mL PRN    dextrose 10% bolus 250 mL 250 mL PRN    famotidine tablet 20 mg Daily    glucagon (human recombinant) injection 1 mg PRN    glucose chewable tablet 16 g PRN    glucose chewable tablet 24 g PRN    heparin (porcine) injection 1,000 Units PRN    hydrALAZINE tablet 50 mg TID PRN    insulin aspart U-100 pen 0-5 Units QID (AC + HS) PRN    melatonin tablet 6 mg Nightly PRN    naloxone 0.4 mg/mL injection 0.02 mg PRN    sertraline tablet 100 mg Daily    sodium bicarbonate tablet 650 mg TID    sodium chloride 0.9% flush 5 mL PRN    sodium zirconium cyclosilicate packet 5 g Once       Objective:     Vital Signs (Most Recent):  Temp: 99.1 °F (37.3 °C) (06/13/24 1135)  Pulse: 87 (06/13/24 1135)  Resp: 18 (06/13/24 1135)  BP: 131/71 (06/13/24 1135)  SpO2: 97 % (06/13/24 1135) Vital Signs (24h Range):  Temp:  [97.8 °F (36.6 °C)-99.1 °F (37.3 °C)] 99.1 °F (37.3 °C)  Pulse:  [] 87  Resp:  [16-18] 18  SpO2:  [91 %-99  %] 97 %  BP: (122-193)/(60-84) 131/71     Weight: 74.5 kg (164 lb 3.9 oz) (06/12/24 0608)  Body mass index is 50.12 kg/m².  Body surface area is 1.59 meters squared.    I/O last 3 completed shifts:  In: -   Out: 2600 [Other:2600]     Physical Exam  Vitals and nursing note reviewed.   Constitutional:       General: She is not in acute distress.     Appearance: She is obese.      Comments: Awake and alert but disoriented   HENT:      Head: Normocephalic and atraumatic.      Nose: Nose normal.      Mouth/Throat:      Pharynx: No oropharyngeal exudate.   Eyes:      Extraocular Movements: Extraocular movements intact.      Conjunctiva/sclera: Conjunctivae normal.   Cardiovascular:      Rate and Rhythm: Normal rate and regular rhythm.      Heart sounds: Normal heart sounds.   Pulmonary:      Effort: Pulmonary effort is normal. No respiratory distress.   Abdominal:      General: Bowel sounds are normal. There is no distension.      Palpations: Abdomen is soft.   Musculoskeletal:         General: Normal range of motion.      Cervical back: Normal range of motion.      Comments: BL AKA. Non focally TTP. Moves spontaneously BL   Skin:     General: Skin is warm and dry.   Neurological:      Mental Status: She is alert.      Comments: Follows commands. Answering direct questions.   Psychiatric:         Mood and Affect: Mood normal.         Behavior: Behavior normal.          Significant Labs:  CBC:   Recent Labs   Lab 06/13/24  0436   WBC 3.80*   RBC 3.10*   HGB 10.1*   HCT 30.4*      MCV 98   MCH 32.6*   MCHC 33.2     CMP:   Recent Labs   Lab 06/13/24  0436   *   CALCIUM 9.0   ALBUMIN 2.9*   PROT 8.2   *   K 4.4   CO2 23   CL 94*   BUN 49*   CREATININE 4.4*   ALKPHOS 186*   ALT 14   AST 22   BILITOT 1.0     All labs within the past 24 hours have been reviewed.       Assessment/Plan:     Neuro  * Postconcussive syndrome  - defer to primary team     Renal/  ESRD (end stage renal disease)  58 y.o. Black or   Female ESRD-HD M-W-F presents to ED on 6/11/2024 with diagnosis of: Altered mental status [R41.82];Chest pain [R07.9]   Nephrology consulted for inpatient ESRD-HD management    Outpatient HD Information:  -Dialysis modality: Hemodialysis  -Outpatient HD unit: [facility]  -Nephrologist: ?  -HD TX days: Monday/Wednesday/Friday, duration of treatment: 3 hrs   -Last HD TX prior to hospital admission: 6/10/24  -Dialysis access: dialysis catheter   -Residual urine: Minium  -EDW: 67 kg     Assessment:     - HD tomorrow 6/14 for metabolic clearance and volume management   - Labs reviewed and dialysate to be adjusted to current labs.   - Continue to monitor intake and output  - Please avoid gadolinium, fleets, phos-based laxatives, NSAIDs  - Dialysis thrice weekly unless more urgent indications arise. Will evaluate RRT requirements Daily.    Anemia of ESRD   Recent Labs   Lab 06/11/24  2240 06/13/24  0436   WBC 4.54 3.80*   HGB 10.5* 10.1*   HCT 31.2* 30.4*    215       Lab Results   Component Value Date    FESATURATED 26 02/04/2024    FERRITIN 1,702 (H) 02/04/2024       - Goal in ESRD is Hgb of 10-11. Hgb 10.5. At goal.  - EPO can be administered and dosed per his OP unit upon discharge.    Mineral Bone Disease in ESRD   Lab Results   Component Value Date     (H) 05/22/2024    CALCIUM 9.0 06/13/2024    ALBUMIN 2.9 (L) 06/13/2024    CAION 0.92 (L) 05/17/2023    PHOS 4.7 (H) 06/13/2024       - F/U PO4, Mg, Calcium. And albumin levels daily.   - Renal diet with protein intake goal 1.5 g/kg/d with 1 L fluid restriction   - Novasource with meals  - Continue home phos binders          Thank you for your consult. I will follow-up with patient. Please contact us if you have any additional questions.    Katie Monique DNP  Nephrology  [facility]

## 2024-06-13 NOTE — SUBJECTIVE & OBJECTIVE
Interval History: HD yesterday, tolerated well. Net UF 2L    Review of patient's allergies indicates:   Allergen Reactions    Ciprofloxacin Itching    Iodine      Kidney injury    Pcn [penicillins]      Rash; tolerated ceftriaxone on 1/13/20     Current Facility-Administered Medications   Medication Frequency    albuterol-ipratropium 2.5 mg-0.5 mg/3 mL nebulizer solution 3 mL Q4H PRN    allopurinol split tablet 50 mg Every other day    apixaban tablet 5 mg BID    atorvastatin tablet 80 mg Daily    bisacodyL suppository 10 mg Daily PRN    calcitRIOL capsule 0.5 mcg Daily    calcium acetate(phosphat bind) capsule 667 mg TID WM    clopidogreL tablet 75 mg Daily    dextrose 10% bolus 125 mL 125 mL PRN    dextrose 10% bolus 250 mL 250 mL PRN    famotidine tablet 20 mg Daily    glucagon (human recombinant) injection 1 mg PRN    glucose chewable tablet 16 g PRN    glucose chewable tablet 24 g PRN    heparin (porcine) injection 1,000 Units PRN    hydrALAZINE tablet 50 mg TID PRN    insulin aspart U-100 pen 0-5 Units QID (AC + HS) PRN    melatonin tablet 6 mg Nightly PRN    naloxone 0.4 mg/mL injection 0.02 mg PRN    sertraline tablet 100 mg Daily    sodium bicarbonate tablet 650 mg TID    sodium chloride 0.9% flush 5 mL PRN    sodium zirconium cyclosilicate packet 5 g Once       Objective:     Vital Signs (Most Recent):  Temp: 99.1 °F (37.3 °C) (06/13/24 1135)  Pulse: 87 (06/13/24 1135)  Resp: 18 (06/13/24 1135)  BP: 131/71 (06/13/24 1135)  SpO2: 97 % (06/13/24 1135) Vital Signs (24h Range):  Temp:  [97.8 °F (36.6 °C)-99.1 °F (37.3 °C)] 99.1 °F (37.3 °C)  Pulse:  [] 87  Resp:  [16-18] 18  SpO2:  [91 %-99 %] 97 %  BP: (122-193)/(60-84) 131/71     Weight: 74.5 kg (164 lb 3.9 oz) (06/12/24 0608)  Body mass index is 50.12 kg/m².  Body surface area is 1.59 meters squared.    I/O last 3 completed shifts:  In: -   Out: 2600 [Other:2600]     Physical Exam  Vitals and nursing note reviewed.   Constitutional:       General: She  is not in acute distress.     Appearance: She is obese.      Comments: Awake and alert but disoriented   HENT:      Head: Normocephalic and atraumatic.      Nose: Nose normal.      Mouth/Throat:      Pharynx: No oropharyngeal exudate.   Eyes:      Extraocular Movements: Extraocular movements intact.      Conjunctiva/sclera: Conjunctivae normal.   Cardiovascular:      Rate and Rhythm: Normal rate and regular rhythm.      Heart sounds: Normal heart sounds.   Pulmonary:      Effort: Pulmonary effort is normal. No respiratory distress.   Abdominal:      General: Bowel sounds are normal. There is no distension.      Palpations: Abdomen is soft.   Musculoskeletal:         General: Normal range of motion.      Cervical back: Normal range of motion.      Comments: BL AKA. Non focally TTP. Moves spontaneously BL   Skin:     General: Skin is warm and dry.   Neurological:      Mental Status: She is alert.      Comments: Follows commands. Answering direct questions.   Psychiatric:         Mood and Affect: Mood normal.         Behavior: Behavior normal.          Significant Labs:  CBC:   Recent Labs   Lab 06/13/24  0436   WBC 3.80*   RBC 3.10*   HGB 10.1*   HCT 30.4*      MCV 98   MCH 32.6*   MCHC 33.2     CMP:   Recent Labs   Lab 06/13/24  0436   *   CALCIUM 9.0   ALBUMIN 2.9*   PROT 8.2   *   K 4.4   CO2 23   CL 94*   BUN 49*   CREATININE 4.4*   ALKPHOS 186*   ALT 14   AST 22   BILITOT 1.0     All labs within the past 24 hours have been reviewed.

## 2024-06-13 NOTE — NURSING
Nurses Note -- 4 Eyes      6/13/2024   5:27 AM      Skin assessed during: Q Shift Change      [] No Altered Skin Integrity Present    []Prevention Measures Documented      [x] Yes- Altered Skin Integrity Present or Discovered   [] LDA Added if Not in Epic (Describe Wound)   [] New Altered Skin Integrity was Present on Admit and Documented in LDA   [] Wound Image Taken    Wound Care Consulted? No    Attending Nurse:  Jenna Lin RN/Staff Member:   Genny    No new wounds noted

## 2024-06-13 NOTE — ASSESSMENT & PLAN NOTE
58 y.o. Black or  Female ESRD-HD M-W-F presents to ED on 6/11/2024 with diagnosis of: Altered mental status [R41.82];Chest pain [R07.9]   Nephrology consulted for inpatient ESRD-HD management    Outpatient HD Information:  -Dialysis modality: Hemodialysis  -Outpatient HD unit: [facility]  -Nephrologist: ?  -HD TX days: Monday/Wednesday/Friday, duration of treatment: 3 hrs   -Last HD TX prior to hospital admission: 6/10/24  -Dialysis access: dialysis catheter   -Residual urine: Minium  -EDW: 67 kg     Assessment:     - HD tomorrow 6/14 for metabolic clearance and volume management   - Labs reviewed and dialysate to be adjusted to current labs.   - Continue to monitor intake and output  - Please avoid gadolinium, fleets, phos-based laxatives, NSAIDs  - Dialysis thrice weekly unless more urgent indications arise. Will evaluate RRT requirements Daily.    Anemia of ESRD   Recent Labs   Lab 06/11/24  2240 06/13/24  0436   WBC 4.54 3.80*   HGB 10.5* 10.1*   HCT 31.2* 30.4*    215       Lab Results   Component Value Date    FESATURATED 26 02/04/2024    FERRITIN 1,702 (H) 02/04/2024       - Goal in ESRD is Hgb of 10-11. Hgb 10.5. At goal.  - EPO can be administered and dosed per his OP unit upon discharge.    Mineral Bone Disease in ESRD   Lab Results   Component Value Date     (H) 05/22/2024    CALCIUM 9.0 06/13/2024    ALBUMIN 2.9 (L) 06/13/2024    CAION 0.92 (L) 05/17/2023    PHOS 4.7 (H) 06/13/2024       - F/U PO4, Mg, Calcium. And albumin levels daily.   - Renal diet with protein intake goal 1.5 g/kg/d with 1 L fluid restriction   - Novasource with meals  - Continue home phos binders

## 2024-06-13 NOTE — NURSING
Nurses Note -- 4 Eyes      6/13/2024   6:16 PM      Skin assessed during: Q Shift Change      [] No Altered Skin Integrity Present    []Prevention Measures Documented      [x] Yes- Altered Skin Integrity Present or Discovered   [] LDA Added if Not in Epic (Describe Wound)   [] New Altered Skin Integrity was Present on Admit and Documented in LDA   [x] Wound Image Taken    Wound Care Consulted? Yes    Attending Nurse:  Katie Lin RN/Staff Member:   Lisa    Wound care consult with standard orders placed.

## 2024-06-13 NOTE — PLAN OF CARE
Problem: Adult Inpatient Plan of Care  Goal: Plan of Care Review  Outcome: Met  Goal: Patient-Specific Goal (Individualized)  Outcome: Met  Goal: Absence of Hospital-Acquired Illness or Injury  Outcome: Met  Goal: Optimal Comfort and Wellbeing  Outcome: Met  Goal: Readiness for Transition of Care  Outcome: Met     Problem: Bariatric Environmental Safety  Goal: Safety Maintained with Care  Outcome: Met     Problem: Infection  Goal: Absence of Infection Signs and Symptoms  Outcome: Met     Problem: Diabetes Comorbidity  Goal: Blood Glucose Level Within Targeted Range  Outcome: Met     Problem: Sepsis/Septic Shock  Goal: Optimal Coping  Outcome: Met  Goal: Absence of Bleeding  Outcome: Met  Goal: Blood Glucose Level Within Targeted Range  Outcome: Met  Goal: Absence of Infection Signs and Symptoms  Outcome: Met  Goal: Optimal Nutrition Intake  Outcome: Met     Problem: Wound  Goal: Optimal Coping  Outcome: Met  Goal: Optimal Functional Ability  Outcome: Met  Goal: Absence of Infection Signs and Symptoms  Outcome: Met  Goal: Improved Oral Intake  Outcome: Met  Goal: Optimal Pain Control and Function  Outcome: Met  Goal: Skin Health and Integrity  Outcome: Met  Goal: Optimal Wound Healing  Outcome: Met     Problem: Skin Injury Risk Increased  Goal: Skin Health and Integrity  Outcome: Met     Problem: Hemodialysis  Goal: Safe, Effective Therapy Delivery  Outcome: Met  Goal: Effective Tissue Perfusion  Outcome: Met  Goal: Absence of Infection Signs and Symptoms  Outcome: Met   Patient Ready for discharge. No distress noted. Bed at lowest position. Call light within reach. Discharge papers given

## 2024-06-13 NOTE — HOSPITAL COURSE
Suyapa Connelly is a 58 y.o. female who was admitted to hospital medicine for post concussive syndrome. Mental status waxing and waning throughout stay. When seen and examined by me patient is A/O x 3. PT/OT consulted, patient unwilling to work with them or follow commands. SLP recommending regular diet but maintaining aspiration precautions. Pt was seen and evaluated by me this morning, reports feeling well. Spoke to patient's  and all questions were answered. Patient enrolled with [facility] as well as home health. Patient was discharged on 6/13/2024 in stable condition with PCP follow-up. Education regarding condition provided and return precautions given.     Physical Exam  Gen: in NAD, appears stated age  Neuro: AAOx3  CVS: RRR  Resp: no belabored breathing or accessory muscle use appreciated   Extrem: bilateral AKA

## 2024-06-13 NOTE — PLAN OF CARE
Referral sent to [facility] via email per protocol.   Will continue to update plan as needed.  Feliciano Bridges RN,BSN

## 2024-06-13 NOTE — NURSING
Patient AAOX2. No distress noted. Patient has wound in the sacral area, open to air clean, dry and intact. Purewick in place.  at bed side. Call light within reach. Bed at lowest position.

## 2024-06-13 NOTE — PLAN OF CARE
[facility]      HOME HEALTH ORDERS  FACE TO FACE ENCOUNTER    Patient Name: Suyapa Connelly  YOB: 1965    PCP: Feliciano Nguyen III, PA-C   PCP Address: [facility] [facility] [address]  PCP Phone Number: [phone]  PCP Fax: [phone]    Encounter Date: 6/11/24    Admit to Home Health    Diagnoses:  Active Hospital Problems    Diagnosis  POA    *Postconcussive syndrome [F07.81]  Yes    ESRD (end stage renal disease) [N18.6]  Yes    Metabolic acidosis [E87.20]  Yes    Hyperkalemia [E87.5]  Yes     K 5.7, ESRD patient, improved with HD      Debility [R53.81]  Yes    Chronic combined systolic and diastolic heart failure [I50.42]  Yes    Hyponatremia [E87.1]  Yes    Paroxysmal atrial fibrillation [I48.0]  Yes    Anemia due to chronic kidney disease, on chronic dialysis [N18.6, D63.1, Z99.2]  Not Applicable    CAD (coronary artery disease) [I25.10]  Yes     Formatting of this note might be different from the original.  Last Assessment & Plan:   Formatting of this note might be different from the original.  -- Optimize glucose control.      Type 2 diabetes mellitus with hyperglycemia, with long-term current use of insulin [E11.65, Z79.4]  Not Applicable    Peripheral vascular disease [I73.9]  Yes      Resolved Hospital Problems   No resolved problems to display.       Follow Up Appointments:  No future appointments.    Allergies:  Review of patient's allergies indicates:   Allergen Reactions    Ciprofloxacin Itching    Iodine      Kidney injury    Pcn [penicillins]      Rash; tolerated ceftriaxone on 1/13/20       Medications: Review discharge medications with patient and family and provide education.    Current Facility-Administered Medications   Medication Dose Route Frequency Provider Last Rate Last Admin    albuterol-ipratropium 2.5 mg-0.5 mg/3 mL nebulizer solution 3 mL  3 mL Nebulization Q4H PRN Leticia Ley PA-C        allopurinol split tablet 50 mg  50 mg Oral  Every other day Leticia Ley PA-C   50 mg at 06/13/24 0900    apixaban tablet 5 mg  5 mg Oral BID Leticia Ley PA-C   5 mg at 06/13/24 0904    atorvastatin tablet 80 mg  80 mg Oral Daily Leticia Ley PA-C   80 mg at 06/13/24 0856    bisacodyL suppository 10 mg  10 mg Rectal Daily PRN Leticia Ley PA-C        calcitRIOL capsule 0.5 mcg  0.5 mcg Oral Daily Leticia Ley PA-C   0.5 mcg at 06/13/24 0856    calcium acetate(phosphat bind) capsule 667 mg  667 mg Oral TID WM Leticia Ley PA-C   667 mg at 06/13/24 1220    clopidogreL tablet 75 mg  75 mg Oral Daily Leticia Ley PA-C   75 mg at 06/13/24 0856    dextrose 10% bolus 125 mL 125 mL  12.5 g Intravenous PRN Leticia Ley PA-C        dextrose 10% bolus 250 mL 250 mL  25 g Intravenous PRN Leticia Ley PA-C        famotidine tablet 20 mg  20 mg Oral Daily Leticia Ley PA-C   20 mg at 06/13/24 0900    glucagon (human recombinant) injection 1 mg  1 mg Intramuscular PRN Leticia Ley PA-C        glucose chewable tablet 16 g  16 g Oral PRN Leticia Ley PA-C        glucose chewable tablet 24 g  24 g Oral PRN Leticia Ley PA-C        heparin (porcine) injection 1,000 Units  1,000 Units Intra-Catheter PRN Sarah Faulkner, CHRIS, FNP-C   1,000 Units at 06/12/24 1808    hydrALAZINE tablet 50 mg  50 mg Oral TID PRN Leticia Ley PA-C        insulin aspart U-100 pen 0-5 Units  0-5 Units Subcutaneous QID (AC + HS) PRN Leticia Ley PA-C   2 Units at 06/13/24 1304    melatonin tablet 6 mg  6 mg Oral Nightly PRN Leticia Ley PA-C        naloxone 0.4 mg/mL injection 0.02 mg  0.02 mg Intravenous PRN Leticia Ley PA-C        sertraline tablet 100 mg  100 mg Oral Daily Leticia Ley PA-C   100 mg at 06/13/24 0900    sodium bicarbonate tablet 650 mg  650 mg Oral TID Leticia Ley PA-C   650 mg at 06/13/24 0855    sodium chloride 0.9% flush 5 mL  5 mL Intravenous  PRN Leticia Ley PA-C        sodium zirconium cyclosilicate packet 5 g  5 g Oral Once Leticia Ley PA-C         Current Discharge Medication List        CONTINUE these medications which have NOT CHANGED    Details   acetaminophen (TYLENOL) 500 MG tablet Take 500 mg by mouth every 6 (six) hours as needed for Pain.      allopurinoL (ZYLOPRIM) 100 MG tablet Take 0.5 tablets (50 mg total) by mouth every other day.  Qty: 8 tablet, Refills: 11      apixaban (ELIQUIS) 5 mg Tab Take 1 tablet (5 mg total) by mouth 2 (two) times daily.  Qty: 60 tablet, Refills: 11      atorvastatin (LIPITOR) 80 MG tablet Take 1 tablet (80 mg total) by mouth once daily.  Qty: 90 tablet, Refills: 3    Associated Diagnoses: Coronary artery disease, unspecified vessel or lesion type, unspecified whether angina present, unspecified whether native or transplanted heart      blood sugar diagnostic Strp Use to check blood glucose 2 (two) times a day.  Qty: 100 strip, Refills: 0      calcitRIOL (ROCALTROL) 0.5 MCG Cap Take 1 capsule (0.5 mcg total) by mouth once daily.  Qty: 90 capsule, Refills: 3      calcium acetate,phosphat bind, (PHOSLO) 667 mg capsule Take 1 capsule (667 mg total) by mouth 3 (three) times daily with meals.  Qty: 90 capsule, Refills: 11      carvediloL (COREG) 6.25 MG tablet Take 1 tablet (6.25 mg total) by mouth 2 (two) times daily.  Qty: 180 tablet, Refills: 3    Comments: .      clopidogreL (PLAVIX) 75 mg tablet Take 1 tablet (75 mg total) by mouth once daily.  Qty: 90 tablet, Refills: 3    Associated Diagnoses: S/P CABG x 1      famotidine (PEPCID) 20 MG tablet Take 1 tablet (20 mg total) by mouth 2 (two) times daily.  Qty: 180 tablet, Refills: 3      hydrALAZINE (APRESOLINE) 50 MG tablet Take 1 tablet (50 mg total) by mouth 3 (three) times daily.  Qty: 270 tablet, Refills: 3    Comments: .      insulin aspart, niacinamide, (FIASP FLEXTOUCH U-100 INSULIN) 100 unit/mL (3 mL) InPn Inject 3 Units into the skin 3  "(three) times daily with meals.      lancets [facility] To check BG 3 times daily, to use with insurance preferred meter  Qty: 100 each, Refills: 3      multivitamin (ONE DAILY MULTIVITAMIN) per tablet Take 1 tablet by mouth once daily.      pen needle, diabetic 32 gauge x 5/32" Ndle 1 Units by Misc.(Non-Drug; Combo Route) route before meals as needed (SSI).  Qty: 50 each, Refills: 3      sertraline (ZOLOFT) 100 MG tablet Take 1 tablet (100 mg total) by mouth once daily.  Qty: 90 tablet, Refills: 3    Associated Diagnoses: CAROLIN (generalized anxiety disorder)      sodium bicarbonate 650 MG tablet TAKE 1 TABLET(650 MG) BY MOUTH THREE TIMES DAILY  Qty: 90 tablet, Refills: 0      traZODone (DESYREL) 50 MG tablet Take 0.5 tablets (25 mg total) by mouth every evening.  Qty: 15 tablet, Refills: 11      wound dressings (TRIAD WOUND DRESSING) Pste Apply 2 g topically once daily.  Qty: 170 g, Refills: 0               I have seen and examined this patient within the last 30 days. My clinical findings that support the need for the home health skilled services and home bound status are the following:no   Weakness/numbness causing balance and gait disturbance due to ESRD, bilateral AKA making it taxing to leave home.     Diet:   other minced and moist    Referrals/ Consults  Physical Therapy to evaluate and treat. Evaluate for home safety and equipment needs; Establish/upgrade home exercise program. Perform / instruct on therapeutic exercises, gait training, transfer training, and Range of Motion.  Occupational Therapy to evaluate and treat. Evaluate home environment for safety and equipment needs. Perform/Instruct on transfers, ADL training, ROM, and therapeutic exercises.  Aide to provide assistance with personal care, ADLs, and vital signs.    Activities:   activity as tolerated    Nursing:   Agency to admit patient within 24 hours of hospital discharge unless specified on physician order or at patient request    SN to complete " comprehensive assessment including routine vital signs. Instruct on disease process and s/s of complications to report to MD. Review/verify medication list sent home with the patient at time of discharge  and instruct patient/caregiver as needed. Frequency may be adjusted depending on start of care date.     Skilled nurse to perform up to 3 visits PRN for symptoms related to diagnosis    Notify MD if SBP > 160 or < 90; DBP > 90 or < 50; HR > 120 or < 50; Temp > 101; O2 < 88%    Ok to schedule additional visits based on staff availability and patient request on consecutive days within the home health episode.    When multiple disciplines ordered:    Start of Care occurs on Sunday - Wednesday schedule remaining discipline evaluations as ordered on separate consecutive days following the start of care.    Thursday SOC -schedule subsequent evaluations Friday and Monday the following week.     Friday - Saturday SOC - schedule subsequent discipline evaluations on consecutive days starting Monday of the following week.    For all post-discharge communication and subsequent orders please contact patient's primary care physician. If unable to reach primary care physician or do not receive response within 30 minutes, please contact PCP for clinical staff order clarification    Miscellaneous   Routine Skin for Bedridden Patients: Instruct patient/caregiver to apply moisture barrier cream to all skin folds and wet areas in perineal area daily and after baths and all bowel movements.    Home Health Aide:  Physical Therapy Three times weekly, Occupational Therapy Three times weekly, and Home Health Aide Three times weekly    Wound Care Orders  no    I certify that this patient is confined to her home and needs intermittent skilled nursing care, physical therapy, and occupational therapy.      Earnestine Parra PA-C  Department of Hospital Medicine  [facility]

## 2024-06-13 NOTE — DISCHARGE SUMMARY
"[facility] [address] [place] [address] Medicine  Discharge Summary      Patient Name: Suyapa Connelly  MRN: [id number]  CHAVEZ: [id number]  Patient Class: OP- Observation  Admission Date: 6/11/2024  Hospital Length of Stay: 0 days  Discharge Date and Time: No discharge date for patient encounter.  Attending Physician: Frances Hutchinson MD   Discharging Provider: Earnestine Parra PA-C  Primary Care Provider: Feliciano Nguyen III, PA-C  [facility] Medicine Team: [facility] HOSP MED E Earnestine Parra PA-C  Primary Care Team: [facility] HOSP MED E    HPI:   Suyapa Connelly is a 58 y.o. male with PMHx of ESRD on HD MFW, b/l AKAs, CAD s/p CABG, afib on eliquis, HTN presenting to [facility] ED with AMS after hitting her hear. Per , patient was at her optimal mental baseline the last few days before she fell forward in her wheelchair when reaching for something and hitting her head on the ground. She was unconscious for about 15 minutes after this and has continued to be encephalopathic since then.  reports she was doing better than she has in about one year just prior to this. She is normally unaware of time (month, year) at baseline but is able to name her self, her , and place. She is WC bound at baseline. She was unable to feed herself for the past year but on June 9th was able to feed herself for the first time. She did not seem unwell in any way prior to this event. She was reaching forward and hit her nose on the floor. She did not seem to be fainting leading to the fall. Unable to obtain history for patient 2/2 encephalopathy. She follows commands and opens her eyes to voice and moves all extremities spontaneously.     She was just admitted to this hospital for AMS 5/28- 6/3.   Per discharge summary:  "Suyapa Connelly was placed in  observation for workup of encephalopathy. Patient continues to be confused and minimally engaged. She is able to follow commands with prompting. UA infectious, reflex culture pending. Start " "empiric rocephin - cultures did not isolate an organism in predominance. EEG obtained: "No epileptiform discharges, periodic discharges, lateralized rhythmic delta activity or electrographic seizures were seen. IMPRESSION: "Abnormal EEG due to a mild to moderate generalized cerebral dysfunction with no electrographic seizures or indications of seizure tendency."  MRI brain without acute changes, revealed "Overall degree of parenchymal volume loss has notably progressed when compared to the 05/06/2018 brain MRI. Neuropsychiatric consultation could be considered, as warranted clinically."     Medically ready to be discharged, discussed with  via phone call who initially wished to pursue NH placement. Discharge complicated as patient's family stopped answering the phone or all available numbers were out of service. APS report made per CM. Family came to bedside a few days later requesting to take the patient home - have long term plans to place her at [facility]. Per family patient's mental status had greatly improved since admission."    ED: AFVSS. Labs and imaging in ED with hyponatremia 128. Bicarb 18. Cr and BUN elevated with known ESRD. LFTS chronically elevated (T bili, ALP). Anemia at baseline. LA 0.86. Phos 6.9.  CTH with some limitations but with chronic change without evidence of superimposed acute intracranial process. CT maxillofacial without acute fracture. CT cervical spine without fracture deformity. XR hips without fracture or dislocation. Admitted to  for encephalopathy, likely post concussive in nature.      * No surgery found *      Hospital Course:   Suyapa Connelly is a 58 y.o. female who was admitted to hospital medicine for post concussive syndrome. Mental status waxing and waning throughout stay. When seen and examined by me patient is A/O x 3. PT/OT consulted, patient unwilling to work with them or follow commands. SLP recommending regular diet but maintaining aspiration " precautions. Pt was seen and evaluated by me this morning, reports feeling well. Spoke to patient's  and all questions were answered. Patient enrolled with [facility] as well as home health. Patient was discharged on 6/13/2024 in stable condition with PCP follow-up. Education regarding condition provided and return precautions given.     Physical Exam  Gen: in NAD, appears stated age  Neuro: AAOx3  CVS: RRR  Resp: no belabored breathing or accessory muscle use appreciated   Extrem: bilateral AKA       Goals of Care Treatment Preferences:  Code Status: Full Code      Consults:   Consults (From admission, onward)          Status Ordering Provider     Inpatient consult to Interventional Radiology  Once        Provider:  (Not yet assigned)    Completed SERA MANUEL     Inpatient consult to Midline team  Once        Provider:  (Not yet assigned)    Completed RADHA TRUJILLO     Inpatient consult to Nephrology  Once        Provider:  (Not yet assigned)    Completed RADHA TRUJILLO            Neuro  * Postconcussive syndrome  Per , patient was at her optimal mental baseline the last few days before she fell forward in her wheelchair when reaching for something and hitting her head on the ground. She was unconscious for about 15 minutes after this and has continued to be encephalopathic since then. Do not suspect syncope or other cause of mental status change at this time. Seems most likely post concussive s/p head trauma given timeline from  and normal mental status just prior to this.     Labs ad imaging in ED with hyponatremia 128. Bicarb 18. Cr and BUN elevated with known ESRD. LFTS chronically elevated (T bili, ALP). Anemia at baseline. LA 0.86. Phos 6.9.  CTH with some limitations but with chronic change without evidence of superimposed acute intracranial process. CT maxillofacial without acute fracture. CT cervical spine without fracture deformity. XR hips without  fracture or dislocation.   - UA pending. VBG and ammonia pending.  - Monitor neuro checks q4h  - Aspiration precautions. Fall precautions.   - Delirium precautions  - Bedside swallow prior to any PO  - Further work up if continued to be encephalopathic   - At baseline,  states she is able to name person, place and just recently has been able to feed herself (after one year of needing assistance)      Final Active Diagnoses:    Diagnosis Date Noted POA    PRINCIPAL PROBLEM:  Postconcussive syndrome [F07.81] 06/12/2024 Yes    ESRD (end stage renal disease) [N18.6] 04/21/2023 Yes    Metabolic acidosis [E87.20] 08/16/2022 Yes    Hyperkalemia [E87.5] 03/26/2022 Yes    Debility [R53.81] 02/12/2022 Yes    Chronic combined systolic and diastolic heart failure [I50.42] 06/21/2021 Yes    Hyponatremia [E87.1] 07/22/2020 Yes    Paroxysmal atrial fibrillation [I48.0] 01/28/2020 Yes    Anemia due to chronic kidney disease, on chronic dialysis [N18.6, D63.1, Z99.2] 01/27/2020 Not Applicable    CAD (coronary artery disease) [I25.10] 01/02/2020 Yes    Type 2 diabetes mellitus with hyperglycemia, with long-term current use of insulin [E11.65, Z79.4] 01/02/2020 Not Applicable    Peripheral vascular disease [I73.9] 10/06/2015 Yes      Problems Resolved During this Admission:       Discharged Condition: stable    Disposition: Home or Self Care    Follow Up:   Follow-up Information       [facility] Follow up.    Why: Please contact [facility] for your initial admission visit with the number provided.  Contact information:  [phone]                         Patient Instructions:      Notify your health care provider if you experience any of the following:  increased confusion or weakness     Notify your health care provider if you experience any of the following:  persistent dizziness, light-headedness, or visual disturbances     Activity as tolerated       Significant Diagnostic Studies: Labs: All labs within  "the past 24 hours have been reviewed    Pending Diagnostic Studies:       Procedure Component Value Units Date/Time    Ammonia [[id number]] Collected: 06/12/24 0837    Order Status: Sent Lab Status: No result     Specimen: Blood            Medications:  Reconciled Home Medications:      Medication List        CONTINUE taking these medications      ACCU-CHEK SOFTCLIX LANCETS Misc  Generic drug: lancets  To check BG 3 times daily, to use with insurance preferred meter     acetaminophen 500 MG tablet  Commonly known as: TYLENOL  Take 500 mg by mouth every 6 (six) hours as needed for Pain.     allopurinoL 100 MG tablet  Commonly known as: ZYLOPRIM  Take 0.5 tablets (50 mg total) by mouth every other day.     apixaban 5 mg Tab  Commonly known as: ELIQUIS  Take 1 tablet (5 mg total) by mouth 2 (two) times daily.     atorvastatin 80 MG tablet  Commonly known as: LIPITOR  Take 1 tablet (80 mg total) by mouth once daily.     BD MIGUEL 2ND GEN PEN NEEDLE 32 gauge x 5/32" Ndle  Generic drug: pen needle, diabetic  1 Units by Misc.(Non-Drug; Combo Route) route before meals as needed (SSI).     calcitRIOL 0.5 MCG Cap  Commonly known as: ROCALTROL  Take 1 capsule (0.5 mcg total) by mouth once daily.     calcium acetate(phosphat bind) 667 mg capsule  Commonly known as: PHOSLO  Take 1 capsule (667 mg total) by mouth 3 (three) times daily with meals.     carvediloL 6.25 MG tablet  Commonly known as: COREG  Take 1 tablet (6.25 mg total) by mouth 2 (two) times daily.     clopidogreL 75 mg tablet  Commonly known as: PLAVIX  Take 1 tablet (75 mg total) by mouth once daily.     famotidine 20 MG tablet  Commonly known as: PEPCID  Take 1 tablet (20 mg total) by mouth 2 (two) times daily.     FIASP FLEXTOUCH U-100 INSULIN 100 unit/mL (3 mL) Inpn  Generic drug: insulin aspart (niacinamide)  Inject 3 Units into the skin 3 (three) times daily with meals.     hydrALAZINE 50 MG tablet  Commonly known as: APRESOLINE  Take 1 tablet (50 mg total) by " mouth 3 (three) times daily.     ONE DAILY MULTIVITAMIN per tablet  Generic drug: multivitamin  Take 1 tablet by mouth once daily.     sertraline 100 MG tablet  Commonly known as: ZOLOFT  Take 1 tablet (100 mg total) by mouth once daily.     sodium bicarbonate 650 MG tablet  TAKE 1 TABLET(650 MG) BY MOUTH THREE TIMES DAILY     traZODone 50 MG tablet  Commonly known as: DESYREL  Take 0.5 tablets (25 mg total) by mouth every evening.     TRIAD WOUND DRESSING Pste  Generic drug: wound dressings  Apply 2 g topically once daily.     TRUE METRIX GLUCOSE TEST STRIP Strp  Generic drug: blood sugar diagnostic  Use to check blood glucose 2 (two) times a day.              Indwelling Lines/Drains at time of discharge:   Lines/Drains/Airways       Central Venous Catheter Line  Duration                  Hemodialysis Catheter left subclavian -- days         Hemodialysis Catheter 05/25/23 1336 left internal jugular 385 days                    Time spent on the discharge of patient: 36 minutes         Earnestine Parra PA-C  Department of Hospital Medicine  [facility]

## 2024-06-13 NOTE — PLAN OF CARE
Problem: Adult Inpatient Plan of Care  Goal: Plan of Care Review  6/13/2024 0525 by Jenna Vazquez LPN  Outcome: Progressing  6/13/2024 0525 by Jenna Vazquez LPN  Outcome: Progressing     Problem: Diabetes Comorbidity  Goal: Blood Glucose Level Within Targeted Range  6/13/2024 0525 by Jenna Vazquez LPN  Outcome: Progressing  6/13/2024 0525 by Jenna Vazquez LPN  Outcome: Progressing     Problem: Wound  Goal: Optimal Coping  6/13/2024 0525 by Jenna Vazquez LPN  Outcome: Progressing  6/13/2024 0525 by Jenna Vazquez LPN  Outcome: Progressing     Problem: Skin Injury Risk Increased  Goal: Skin Health and Integrity  6/13/2024 0525 by Jenna Vazquez LPN  Outcome: Progressing  6/13/2024 0525 by Jenna Vazquez LPN  Outcome: Progressing     Problem: Hemodialysis  Goal: Safe, Effective Therapy Delivery  Outcome: Progressing

## 2024-06-13 NOTE — PT/OT/SLP PROGRESS
Occupational Therapy      Patient Name:  Suyapa Connelly   MRN:  [id number]    Patient not seen today secondary to pt minimally responsive. Sternal rub, tactile, and verbal stimulation were attempted by therapist and , however pt remained lethargic and unarousable . Will follow-up 6/14/2024.    Occupational Profile Obtained from :  Living Environment: Resides with  in [facility] w/ no NASIR; Bathroom includes walkin shower with built in bench and a standard toilet   DME: jany lift (for car t/f only), w/c, and shower bench   PLOF:  states ~1 month ago patient only needed assistance with donning briefs, however pt was Mod(I) for ADLs and t/f. Due to decline in status, pt requires more assistance. Pt able to feed self as of 6/9  Roles & Routines:  and pt on disability and  provides transportation.  Assistance Upon Discharge:      6/13/2024

## 2024-06-14 NOTE — NURSING
PT anticipated to discharge, spouse at bedside, PT dressed assisted into wheelchair by spouse for transport home in personal vehicle.

## 2024-06-14 NOTE — NURSING
PT discharged. PT AAOx2, disoriented to time and situation, no s/s of pain, distress, or discomfort noted. Discharge instructions reviewed with spouse prior to exit. PT off unit/discharged home with spouse via wheelchair at 2226.

## 2024-06-17 LAB
BACTERIA BLD CULT: NORMAL
BACTERIA BLD CULT: NORMAL

## 2024-06-21 ENCOUNTER — HOSPITAL ENCOUNTER (INPATIENT)
Facility: HOSPITAL | Age: 59
LOS: 13 days | Discharge: HOME HEALTH | DRG: 689 | End: 2024-07-05
Attending: EMERGENCY MEDICINE | Admitting: HOSPITALIST
Payer: MEDICARE

## 2024-06-21 DIAGNOSIS — F41.1 GAD (GENERALIZED ANXIETY DISORDER): ICD-10-CM

## 2024-06-21 DIAGNOSIS — R31.9 URINARY TRACT INFECTION WITH HEMATURIA, SITE UNSPECIFIED: ICD-10-CM

## 2024-06-21 DIAGNOSIS — L89.150 PRESSURE INJURY OF SACRAL REGION, UNSTAGEABLE: ICD-10-CM

## 2024-06-21 DIAGNOSIS — G93.40 ENCEPHALOPATHY: Primary | ICD-10-CM

## 2024-06-21 DIAGNOSIS — D63.1 ANEMIA DUE TO CHRONIC KIDNEY DISEASE, ON CHRONIC DIALYSIS: ICD-10-CM

## 2024-06-21 DIAGNOSIS — N18.6 ESRD (END STAGE RENAL DISEASE): ICD-10-CM

## 2024-06-21 DIAGNOSIS — G93.40 ACUTE ENCEPHALOPATHY: ICD-10-CM

## 2024-06-21 DIAGNOSIS — N39.0 URINARY TRACT INFECTION WITH HEMATURIA, SITE UNSPECIFIED: ICD-10-CM

## 2024-06-21 DIAGNOSIS — N18.6 ANEMIA DUE TO CHRONIC KIDNEY DISEASE, ON CHRONIC DIALYSIS: ICD-10-CM

## 2024-06-21 DIAGNOSIS — Z95.1 S/P CABG X 1: ICD-10-CM

## 2024-06-21 DIAGNOSIS — R94.31 QT PROLONGATION: ICD-10-CM

## 2024-06-21 DIAGNOSIS — R07.9 CHEST PAIN: ICD-10-CM

## 2024-06-21 DIAGNOSIS — E16.2 HYPOGLYCEMIA: ICD-10-CM

## 2024-06-21 DIAGNOSIS — I25.10 CORONARY ARTERY DISEASE, UNSPECIFIED VESSEL OR LESION TYPE, UNSPECIFIED WHETHER ANGINA PRESENT, UNSPECIFIED WHETHER NATIVE OR TRANSPLANTED HEART: ICD-10-CM

## 2024-06-21 DIAGNOSIS — Z99.2 ANEMIA DUE TO CHRONIC KIDNEY DISEASE, ON CHRONIC DIALYSIS: ICD-10-CM

## 2024-06-21 PROBLEM — N30.00 ACUTE CYSTITIS WITHOUT HEMATURIA: Status: ACTIVE | Noted: 2018-06-02

## 2024-06-21 LAB
ALBUMIN SERPL BCP-MCNC: 3.2 G/DL (ref 3.5–5.2)
ALP SERPL-CCNC: 133 U/L (ref 55–135)
ALT SERPL W/O P-5'-P-CCNC: 11 U/L (ref 10–44)
ANION GAP SERPL CALC-SCNC: 13 MMOL/L (ref 8–16)
AST SERPL-CCNC: 23 U/L (ref 10–40)
BACTERIA #/AREA URNS AUTO: ABNORMAL /HPF
BASOPHILS # BLD AUTO: 0.05 K/UL (ref 0–0.2)
BASOPHILS NFR BLD: 1.3 % (ref 0–1.9)
BILIRUB SERPL-MCNC: 1.1 MG/DL (ref 0.1–1)
BILIRUB UR QL STRIP: NEGATIVE
BILIRUB UR QL STRIP: NEGATIVE
BUN SERPL-MCNC: 21 MG/DL (ref 6–20)
CALCIUM SERPL-MCNC: 8.9 MG/DL (ref 8.7–10.5)
CHLORIDE SERPL-SCNC: 94 MMOL/L (ref 95–110)
CLARITY UR REFRACT.AUTO: CLEAR
CLARITY UR REFRACT.AUTO: CLEAR
CO2 SERPL-SCNC: 22 MMOL/L (ref 23–29)
COLOR UR AUTO: YELLOW
COLOR UR AUTO: YELLOW
CREAT SERPL-MCNC: 2.7 MG/DL (ref 0.5–1.4)
DIFFERENTIAL METHOD BLD: ABNORMAL
EOSINOPHIL # BLD AUTO: 0.1 K/UL (ref 0–0.5)
EOSINOPHIL NFR BLD: 1.3 % (ref 0–8)
ERYTHROCYTE [DISTWIDTH] IN BLOOD BY AUTOMATED COUNT: 18.4 % (ref 11.5–14.5)
EST. GFR  (NO RACE VARIABLE): 20 ML/MIN/1.73 M^2
GLUCOSE SERPL-MCNC: 50 MG/DL (ref 70–110)
GLUCOSE UR QL STRIP: NEGATIVE
GLUCOSE UR QL STRIP: NEGATIVE
HCT VFR BLD AUTO: 34.7 % (ref 37–48.5)
HCV AB SERPL QL IA: NORMAL
HGB BLD-MCNC: 11 G/DL (ref 12–16)
HGB UR QL STRIP: ABNORMAL
HGB UR QL STRIP: ABNORMAL
HIV 1+2 AB+HIV1 P24 AG SERPL QL IA: NORMAL
HYALINE CASTS UR QL AUTO: 0 /LPF
IMM GRANULOCYTES # BLD AUTO: 0.01 K/UL (ref 0–0.04)
IMM GRANULOCYTES NFR BLD AUTO: 0.3 % (ref 0–0.5)
KETONES UR QL STRIP: NEGATIVE
KETONES UR QL STRIP: NEGATIVE
LEUKOCYTE ESTERASE UR QL STRIP: ABNORMAL
LEUKOCYTE ESTERASE UR QL STRIP: ABNORMAL
LYMPHOCYTES # BLD AUTO: 0.6 K/UL (ref 1–4.8)
LYMPHOCYTES NFR BLD: 14.7 % (ref 18–48)
MAGNESIUM SERPL-MCNC: 2.1 MG/DL (ref 1.6–2.6)
MCH RBC QN AUTO: 32.7 PG (ref 27–31)
MCHC RBC AUTO-ENTMCNC: 31.7 G/DL (ref 32–36)
MCV RBC AUTO: 103 FL (ref 82–98)
MICROSCOPIC COMMENT: ABNORMAL
MONOCYTES # BLD AUTO: 0.7 K/UL (ref 0.3–1)
MONOCYTES NFR BLD: 18.2 % (ref 4–15)
NEUTROPHILS # BLD AUTO: 2.4 K/UL (ref 1.8–7.7)
NEUTROPHILS NFR BLD: 64.2 % (ref 38–73)
NITRITE UR QL STRIP: NEGATIVE
NITRITE UR QL STRIP: NEGATIVE
NRBC BLD-RTO: 0 /100 WBC
PH UR STRIP: 6 [PH] (ref 5–8)
PH UR STRIP: 6 [PH] (ref 5–8)
PLATELET # BLD AUTO: 166 K/UL (ref 150–450)
PMV BLD AUTO: 9.9 FL (ref 9.2–12.9)
POCT GLUCOSE: 134 MG/DL (ref 70–110)
POCT GLUCOSE: 150 MG/DL (ref 70–110)
POTASSIUM SERPL-SCNC: 3.7 MMOL/L (ref 3.5–5.1)
PROT SERPL-MCNC: 8.4 G/DL (ref 6–8.4)
PROT UR QL STRIP: ABNORMAL
PROT UR QL STRIP: ABNORMAL
RBC # BLD AUTO: 3.36 M/UL (ref 4–5.4)
RBC #/AREA URNS AUTO: 14 /HPF (ref 0–4)
SODIUM SERPL-SCNC: 129 MMOL/L (ref 136–145)
SP GR UR STRIP: 1.02 (ref 1–1.03)
SP GR UR STRIP: 1.02 (ref 1–1.03)
SQUAMOUS #/AREA URNS AUTO: 1 /HPF
URN SPEC COLLECT METH UR: ABNORMAL
URN SPEC COLLECT METH UR: ABNORMAL
WBC # BLD AUTO: 3.8 K/UL (ref 3.9–12.7)
WBC #/AREA URNS AUTO: 74 /HPF (ref 0–5)

## 2024-06-21 PROCEDURE — 83735 ASSAY OF MAGNESIUM: CPT | Mod: HCNC | Performed by: STUDENT IN AN ORGANIZED HEALTH CARE EDUCATION/TRAINING PROGRAM

## 2024-06-21 PROCEDURE — 25000003 PHARM REV CODE 250: Mod: HCNC | Performed by: EMERGENCY MEDICINE

## 2024-06-21 PROCEDURE — 81001 URINALYSIS AUTO W/SCOPE: CPT | Mod: HCNC | Performed by: STUDENT IN AN ORGANIZED HEALTH CARE EDUCATION/TRAINING PROGRAM

## 2024-06-21 PROCEDURE — 80053 COMPREHEN METABOLIC PANEL: CPT | Mod: HCNC | Performed by: STUDENT IN AN ORGANIZED HEALTH CARE EDUCATION/TRAINING PROGRAM

## 2024-06-21 PROCEDURE — 87086 URINE CULTURE/COLONY COUNT: CPT | Mod: HCNC | Performed by: STUDENT IN AN ORGANIZED HEALTH CARE EDUCATION/TRAINING PROGRAM

## 2024-06-21 PROCEDURE — 96365 THER/PROPH/DIAG IV INF INIT: CPT | Mod: HCNC

## 2024-06-21 PROCEDURE — 25000003 PHARM REV CODE 250: Mod: HCNC | Performed by: NURSE PRACTITIONER

## 2024-06-21 PROCEDURE — G0378 HOSPITAL OBSERVATION PER HR: HCPCS | Mod: HCNC

## 2024-06-21 PROCEDURE — 82962 GLUCOSE BLOOD TEST: CPT | Mod: HCNC

## 2024-06-21 PROCEDURE — 96375 TX/PRO/DX INJ NEW DRUG ADDON: CPT | Mod: HCNC

## 2024-06-21 PROCEDURE — 87106 FUNGI IDENTIFICATION YEAST: CPT | Mod: HCNC | Performed by: STUDENT IN AN ORGANIZED HEALTH CARE EDUCATION/TRAINING PROGRAM

## 2024-06-21 PROCEDURE — 87088 URINE BACTERIA CULTURE: CPT | Mod: HCNC | Performed by: STUDENT IN AN ORGANIZED HEALTH CARE EDUCATION/TRAINING PROGRAM

## 2024-06-21 PROCEDURE — 87389 HIV-1 AG W/HIV-1&-2 AB AG IA: CPT | Mod: HCNC | Performed by: PHYSICIAN ASSISTANT

## 2024-06-21 PROCEDURE — 86803 HEPATITIS C AB TEST: CPT | Mod: HCNC | Performed by: PHYSICIAN ASSISTANT

## 2024-06-21 PROCEDURE — 85025 COMPLETE CBC W/AUTO DIFF WBC: CPT | Mod: HCNC | Performed by: STUDENT IN AN ORGANIZED HEALTH CARE EDUCATION/TRAINING PROGRAM

## 2024-06-21 PROCEDURE — 99285 EMERGENCY DEPT VISIT HI MDM: CPT | Mod: 25,HCNC

## 2024-06-21 RX ORDER — ACETAMINOPHEN 325 MG/1
650 TABLET ORAL EVERY 6 HOURS PRN
Status: DISCONTINUED | OUTPATIENT
Start: 2024-06-22 | End: 2024-07-05 | Stop reason: HOSPADM

## 2024-06-21 RX ORDER — IBUPROFEN 200 MG
24 TABLET ORAL
Status: DISCONTINUED | OUTPATIENT
Start: 2024-06-22 | End: 2024-07-05 | Stop reason: HOSPADM

## 2024-06-21 RX ORDER — NALOXONE HCL 0.4 MG/ML
0.02 VIAL (ML) INJECTION
Status: DISCONTINUED | OUTPATIENT
Start: 2024-06-22 | End: 2024-07-05 | Stop reason: HOSPADM

## 2024-06-21 RX ORDER — INSULIN ASPART 100 [IU]/ML
0-5 INJECTION, SOLUTION INTRAVENOUS; SUBCUTANEOUS
Status: DISCONTINUED | OUTPATIENT
Start: 2024-06-22 | End: 2024-07-05 | Stop reason: HOSPADM

## 2024-06-21 RX ORDER — CALCIUM ACETATE 667 MG/1
667 CAPSULE ORAL
Status: DISCONTINUED | OUTPATIENT
Start: 2024-06-22 | End: 2024-07-05 | Stop reason: HOSPADM

## 2024-06-21 RX ORDER — TALC
6 POWDER (GRAM) TOPICAL NIGHTLY PRN
Status: DISCONTINUED | OUTPATIENT
Start: 2024-06-22 | End: 2024-07-05 | Stop reason: HOSPADM

## 2024-06-21 RX ORDER — CLOPIDOGREL BISULFATE 75 MG/1
75 TABLET ORAL DAILY
Status: DISCONTINUED | OUTPATIENT
Start: 2024-06-22 | End: 2024-07-05 | Stop reason: HOSPADM

## 2024-06-21 RX ORDER — PROCHLORPERAZINE EDISYLATE 5 MG/ML
5 INJECTION INTRAMUSCULAR; INTRAVENOUS EVERY 6 HOURS PRN
Status: DISCONTINUED | OUTPATIENT
Start: 2024-06-22 | End: 2024-07-05 | Stop reason: HOSPADM

## 2024-06-21 RX ORDER — ACETAMINOPHEN 500 MG
1000 TABLET ORAL EVERY 8 HOURS PRN
Status: DISCONTINUED | OUTPATIENT
Start: 2024-06-22 | End: 2024-07-05 | Stop reason: HOSPADM

## 2024-06-21 RX ORDER — CARVEDILOL 6.25 MG/1
6.25 TABLET ORAL 2 TIMES DAILY
Status: DISCONTINUED | OUTPATIENT
Start: 2024-06-21 | End: 2024-07-05 | Stop reason: HOSPADM

## 2024-06-21 RX ORDER — ATORVASTATIN CALCIUM 40 MG/1
80 TABLET, FILM COATED ORAL DAILY
Status: DISCONTINUED | OUTPATIENT
Start: 2024-06-22 | End: 2024-07-05 | Stop reason: HOSPADM

## 2024-06-21 RX ORDER — GLUCAGON 1 MG
1 KIT INJECTION
Status: DISCONTINUED | OUTPATIENT
Start: 2024-06-22 | End: 2024-07-05 | Stop reason: HOSPADM

## 2024-06-21 RX ORDER — SODIUM BICARBONATE 650 MG/1
650 TABLET ORAL 3 TIMES DAILY
Status: DISCONTINUED | OUTPATIENT
Start: 2024-06-22 | End: 2024-06-24

## 2024-06-21 RX ORDER — SODIUM CHLORIDE 0.9 % (FLUSH) 0.9 %
10 SYRINGE (ML) INJECTION EVERY 12 HOURS PRN
Status: DISCONTINUED | OUTPATIENT
Start: 2024-06-22 | End: 2024-07-05 | Stop reason: HOSPADM

## 2024-06-21 RX ORDER — CALCITRIOL 0.5 UG/1
0.5 CAPSULE ORAL DAILY
Status: DISCONTINUED | OUTPATIENT
Start: 2024-06-22 | End: 2024-07-05 | Stop reason: HOSPADM

## 2024-06-21 RX ORDER — FAMOTIDINE 20 MG/1
20 TABLET, FILM COATED ORAL DAILY
Status: DISCONTINUED | OUTPATIENT
Start: 2024-06-22 | End: 2024-07-05 | Stop reason: HOSPADM

## 2024-06-21 RX ORDER — IBUPROFEN 200 MG
16 TABLET ORAL
Status: DISCONTINUED | OUTPATIENT
Start: 2024-06-22 | End: 2024-07-05 | Stop reason: HOSPADM

## 2024-06-21 RX ORDER — HYDRALAZINE HYDROCHLORIDE 50 MG/1
50 TABLET, FILM COATED ORAL 3 TIMES DAILY
Status: DISCONTINUED | OUTPATIENT
Start: 2024-06-22 | End: 2024-07-05 | Stop reason: HOSPADM

## 2024-06-21 RX ADMIN — DEXTROSE MONOHYDRATE 125 ML: 100 INJECTION, SOLUTION INTRAVENOUS at 07:06

## 2024-06-21 RX ADMIN — CARVEDILOL 6.25 MG: 6.25 TABLET, FILM COATED ORAL at 11:06

## 2024-06-21 RX ADMIN — APIXABAN 5 MG: 5 TABLET, FILM COATED ORAL at 11:06

## 2024-06-21 NOTE — FIRST PROVIDER EVALUATION
Medical screening examination initiated.  I have conducted a focused provider triage encounter, findings are as follows:    Brief history of present illness:  odorous urine, ESRD on HD, b/l AKA, CAD, afib on eliquis    Vitals:    06/21/24 1550   BP: 132/77   Pulse: 81   Resp: 20   Temp: 98.2 °F (36.8 °C)   TempSrc: Oral   SpO2: 97%   Weight: 74.4 kg (164 lb)   Height: 4' (1.219 m)       Pertinent physical exam:  wheelchair, b/l AKA    Brief workup plan:  Labs, UA    Preliminary workup initiated; this workup will be continued and followed by the physician or advanced practice provider that is assigned to the patient when roomed.

## 2024-06-21 NOTE — ED PROVIDER NOTES
Encounter Date: 6/21/2024       History     Chief Complaint   Patient presents with    Female  Problem     Has pure wick at home,  reports urine smelling and cloudy, max aka     58 y.o. male with PMHx of ESRD on HD MFW, b/l AKAs, CAD s/p CABG, afib on eliquis, HTN presents with confusion and concern for urinary tract infection.  Patient is confused off her baseline per patient's  and he was suspicious for urinary tract infection as this is similar to her previous presentations.  He reports she was appear wet get home and noticed that the urine has been foul-smelling and more cloudy.  He states she was also peeing more frequently in complaining of burning with urination and some occasional suprapubic pain.  Denies any fevers or chills.  Patient denies any pain currently.  Denies any chest pain or shortness of breath.  Per  patient was dialyzed today a full dialysis and they came straight here after.  Denies any nausea vomiting diarrhea or constipation.  Denies any slurred speech, facial asymmetry, unilateral weakness or numbness.  History is limited as patient is confused.    The history is provided by the spouse and the patient. No  was used.     Review of patient's allergies indicates:   Allergen Reactions    Ciprofloxacin Itching    Iodine      Kidney injury    Pcn [penicillins]      Rash; tolerated ceftriaxone on 1/13/20     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    Osteomyelitis     Osteomyelitis of left foot 04/30/2021    Ulcer of left foot     Vaginal delivery     x1     Past Surgical History:   Procedure Laterality Date    ABOVE-KNEE AMPUTATION Left 5/18/2021    Procedure: AMPUTATION, ABOVE  KNEE;  Surgeon: Teddy Huber MD;  Location: [address];  Service: Vascular;  Laterality: Left;    ABOVE-KNEE AMPUTATION Right 3/18/2022    Procedure: AMPUTATION, ABOVE KNEE;  Surgeon: DAYNE Florez II, MD;  Location: [facility] OR [address] [place] [address];  Service: Vascular;  Laterality: Right;    Angiogram - Right Extremity Right 7/9/15    angiogram-left leg  10/6/15    ANGIOGRAPHY OF LOWER EXTREMITY Left 4/29/2021    Procedure: ANGIOGRAM, LOWER EXTREMITY;  Surgeon: Teddy Huber MD;  Location: [address];  Service: Vascular;  Laterality: Left;    BELOW KNEE AMPUTATION OF LOWER EXTREMITY Right 12/28/2021    Procedure: AMPUTATION, BELOW KNEE;  Surgeon: Kaitlyn Rojas MD;  Location: [facility];  Service: General;  Laterality: Right;    CATHETERIZATION OF BOTH LEFT AND RIGHT HEART N/A 12/18/2019    Procedure: CATHETERIZATION, HEART, BOTH LEFT AND RIGHT;  Surgeon: Que Fernando III, MD;  Location: [facility] CATH LAB;  Service: Cardiology;  Laterality: N/A;    CORONARY ANGIOGRAPHY N/A 12/18/2019    Procedure: ANGIOGRAM, CORONARY ARTERY;  Surgeon: Que Fernando III, MD;  Location: [facility] CATH LAB;  Service: Cardiology;  Laterality: N/A;    CORONARY ANGIOGRAPHY INCLUDING BYPASS GRAFTS WITH CATHETERIZATION OF LEFT HEART N/A 7/28/2020    Procedure: ANGIOGRAM, CORONARY, INCLUDING BYPASS GRAFT, WITH LEFT HEART CATHETERIZATION, 9 am;  Surgeon: Rachel Easley MD;  Location: [facility] CATH LAB;  Service: Cardiology;  Laterality: N/A;    CORONARY ARTERY BYPASS GRAFT (CABG) N/A 1/14/2020    Procedure: CORONARY ARTERY BYPASS GRAFT (CABG) x 1     Off Pump;  Surgeon: Huang Altamirano MD;  Location: [address];  Service: Cardiovascular;  Laterality: N/A;    CREATION OF FEMORAL-TIBIAL ARTERY BYPASS Left 4/29/2021    Procedure: CREATION, BYPASS, ARTERIAL, FEMORAL TO ANTERIOR TIBIAL;  Surgeon: Teddy Huber MD;  Location: [address];  Service: Vascular;  Laterality: Left;    CREATION OF FEMOROPOPLITEAL  ARTERIAL BYPASS USING GRAFT Left 8/18/2020    Procedure: CREATION, BYPASS, ARTERIAL, FEMORAL TO POPLITEAL, USING GRAFT, LEFT LOWER EXTREMITY;  Surgeon: Teddy Huber MD;  Location: [facility] OR;  Service: Vascular;  Laterality: Left;  REQUEST 7:15 A.M. START----COVID NEGATIVE ON 8/17  1ST CASE STARTE PER LEANA ON 8/7/2020 @ 942AM-LO  RN PREOP 8/12/2020   T/S-----CLEARED BY CARDS-------PENDING INSURANCE    DEBRIDEMENT OF FOOT Left 9/8/2020    Procedure: DEBRIDEMENT, FOOT;  Surgeon: Rosio Mayes DPM;  Location: [facility] OR;  Service: Podiatry;  Laterality: Left;  request neoxx .   RN Pre Op 9-4-2020, Covid negative on 9/5/20. C A    DEBRIDEMENT OF FOOT  3/4/2021    Procedure: DEBRIDEMENT, FOOT;  Surgeon: Teddy Huber MD;  Location: [facility] OR;  Service: Vascular;;    DEBRIDEMENT OF FOOT Left 3/9/2021    Procedure: DEBRIDEMENT, FOOT, bone biopsy;  Surgeon: Rosio Mayes DPM;  Location: [facility] OR;  Service: Podiatry;  Laterality: Left;  Request neoxx---COVID IN AM  REQUESTING NOON START  RN Phone Pre op.On Blood thinners Plavix and Eliquis.  Covid am of surgery. C A    DEBRIDEMENT OF FOOT Left 5/4/2021    Procedure: DEBRIDEMENT, FOOT;  Surgeon: Farooq Morley DPM;  Location: [address];  Service: Podiatry;  Laterality: Left;    INSERTION OF TUNNELED CENTRAL VENOUS HEMODIALYSIS CATHETER N/A 1/27/2020    Procedure: Insertion, Catheter, Central Venous, Hemodialysis;  Surgeon: ESTEBAN Gomez III, MD;  Location: [facility] CATH LAB;  Service: Peripheral Vascular;  Laterality: N/A;    INSERTION OF TUNNELED CENTRAL VENOUS HEMODIALYSIS CATHETER  5/10/2023    Procedure: Insertion, Catheter, Central Venous, Hemodialysis;  Surgeon: Romulo Queen MD;  Location: [facility] CATH LAB;  Service: Cardiology;;    PERCUTANEOUS TRANSLUMINAL ANGIOPLASTY N/A 3/4/2021    Procedure: PTA (ANGIOPLASTY, PERCUTANEOUS, TRANSLUMINAL);  Surgeon: Teddy Huber MD;  Location: [facility] OR;  Service: Vascular;  Laterality: N/A;    REMOVAL  OF ARTERIOVENOUS GRAFT Left 5/27/2021    Procedure: REMOVAL, GRAFT, LEFT LOWER EXTREMITY, WOUND EXPLORATION;  Surgeon: Teddy Huber MD;  Location: [facility] OR [facility] FLR;  Service: Vascular;  Laterality: Left;    REMOVAL OF NAIL OF DIGIT Left 3/9/2021    Procedure: REMOVAL, NAIL, DIGIT;  Surgeon: Rosio Mayes DPM;  Location: [facility] OR;  Service: Podiatry;  Laterality: Left;    RIGHT HEART CATHETERIZATION Right 5/10/2023    Procedure: INSERTION, CATHETER, RIGHT HEART;  Surgeon: Romulo Queen MD;  Location: [facility] CATH LAB;  Service: Cardiology;  Laterality: Right;    THROMBECTOMY Left 3/4/2021    Procedure: THROMBECTOMY, LEFT LOWER EXTREMITY BYPASS GRAFT, ANGIOGRAM, POSSIBLE INTERVENTION, POSSIBLE LEFT LOWER EXTREMITY BYPASS;  Surgeon: Teddy Huber MD;  Location: [facility] OR;  Service: Vascular;  Laterality: Left;    THROMBECTOMY Left 4/29/2021    Procedure: GRAFT THROMBECTOMY, LEFT LOWER EXTREMITY;  Surgeon: Teddy Huber MD;  [facility];  Service: Vascular;  Laterality: Left;  14.5 min  1179.85 mGy  341.01 Gycm2  240 ml dye    THROMBECTOMY  10/22/2021    Procedure: THROMBECTOMY;  Surgeon: Saad Arenas MD;  Location: [facility] CATH LAB/EP;  Service: Cardiology;;     Family History   Problem Relation Name Age of Onset    Diabetes Mother      Diabetes Father      Heart disease Maternal Grandmother      No Known Problems Maternal Grandfather      Diabetes Paternal Grandmother      No Known Problems Paternal Grandfather      Anesthesia problems Neg Hx       Social History     Tobacco Use    Smoking status: Former    Smokeless tobacco: Never   Substance Use Topics    Alcohol use: No    Drug use: Yes     Types: Marijuana     Comment: occassional     Review of Systems    Physical Exam     Initial Vitals [06/21/24 1550]   BP Pulse Resp Temp SpO2   132/77 81 20 98.2 °F (36.8 °C) 97 %      MAP       --         Physical Exam    Nursing note and vitals reviewed.  Constitutional:   Alert   HENT:    Head: Normocephalic and atraumatic.   Mouth/Throat: Oropharynx is clear and moist.   Eyes: Conjunctivae and EOM are normal. Pupils are equal, round, and reactive to light. No scleral icterus.   Cardiovascular:  Normal rate, regular rhythm and intact distal pulses.           Pulmonary/Chest: Breath sounds normal. No stridor. No respiratory distress. She has no wheezes. She has no rhonchi. She has no rales.   Abdominal: Abdomen is soft. She exhibits no distension and no mass. There is no abdominal tenderness.   No abdominal tenderness or CVA tenderness There is no rebound and no guarding.   Musculoskeletal:      Comments: Bilateral AKA     Neurological: She is alert. She has normal strength.   Oriented to person, place but otherwise confused.  Moves all extremities spontaneously and to command.  No facial asymmetry.  Neuro exam slightly limited due to patient's confusion however grossly nonfocal   Skin: Skin is warm and dry.         ED Course   Procedures  Labs Reviewed   CBC W/ AUTO DIFFERENTIAL - Abnormal; Notable for the following components:       Result Value    WBC 3.80 (*)     RBC 3.36 (*)     Hemoglobin 11.0 (*)     Hematocrit 34.7 (*)      (*)     MCH 32.7 (*)     MCHC 31.7 (*)     RDW 18.4 (*)     Lymph # 0.6 (*)     Lymph % 14.7 (*)     Mono % 18.2 (*)     All other components within normal limits   COMPREHENSIVE METABOLIC PANEL - Abnormal; Notable for the following components:    Sodium 129 (*)     Chloride 94 (*)     CO2 22 (*)     Glucose 50 (*)     BUN 21 (*)     Creatinine 2.7 (*)     Albumin 3.2 (*)     Total Bilirubin 1.1 (*)     eGFR 20.0 (*)     All other components within normal limits   URINALYSIS, REFLEX TO URINE CULTURE - Abnormal; Notable for the following components:    Protein, UA 3+ (*)     Occult Blood UA 3+ (*)     Leukocytes, UA 3+ (*)     All other components within normal limits    Narrative:     Specimen Source->Urine   URINALYSIS, REFLEX TO URINE CULTURE - Abnormal; Notable  for the following components:    Protein, UA 3+ (*)     Occult Blood UA 3+ (*)     Leukocytes, UA 3+ (*)     All other components within normal limits    Narrative:     Specimen Source->Urine   URINALYSIS MICROSCOPIC - Abnormal; Notable for the following components:    RBC, UA 14 (*)     WBC, UA 74 (*)     All other components within normal limits    Narrative:     Specimen Source->Urine   POCT GLUCOSE - Abnormal; Notable for the following components:    POCT Glucose 150 (*)     All other components within normal limits   POCT GLUCOSE - Abnormal; Notable for the following components:    POCT Glucose 134 (*)     All other components within normal limits   CULTURE, URINE   HIV 1 / 2 ANTIBODY    Narrative:     Release to patient->Immediate   HEPATITIS C ANTIBODY    Narrative:     Release to patient->Immediate   MAGNESIUM   SODIUM, URINE, RANDOM   OSMOLALITY, URINE RANDOM   POCT GLUCOSE MONITORING CONTINUOUS          Imaging Results              X-Ray Chest 1 View (Final result)  Result time 06/22/24 00:54:23      Final result by Monik Lara MD (06/22/24 00:54:23)                   Impression:      Please see above.      Electronically signed by: Monik Lara MD  Date:    06/22/2024  Time:    00:54               Narrative:    EXAMINATION:  XR CHEST 1 VIEW    CLINICAL HISTORY:  infectious work up;    TECHNIQUE:  Single frontal view of the chest was performed.    COMPARISON:  06/11/2024    FINDINGS:  Cardiac monitoring leads overlie the chest.  There is a stably positioned left-sided central venous catheter in place.  Postoperative change of median sternotomy.  There is unchanged enlargement of the cardiomediastinal silhouette noting atherosclerosis of the thoracic aorta.  The lungs are symmetrically expanded with diffuse increased interstitial and parenchymal attenuation similar to prior study.  Findings can be seen with interstitial edema or infectious process.  Clinical correlation advised.  No large region of  confluent airspace consolidation or substantial volume of pleural fluid identified.  Osseous structures are intact.                                       CT Head Without Contrast (Final result)  Result time 06/21/24 21:23:20      Final result by Lino Peter MD (06/21/24 21:23:20)                   Impression:      No acute finding or detrimental change when compared with 06/11/2024, allowing for motion and artifact limitations.    Other findings discussed in the body of the report.      Electronically signed by: Lino Peter MD  Date:    06/21/2024  Time:    21:23               Narrative:    EXAMINATION:  CT HEAD WITHOUT CONTRAST    CLINICAL HISTORY:  Mental status change, unknown cause;    TECHNIQUE:  Low dose axial images were obtained through the head.  Coronal and sagittal reformations were also performed. Contrast was not administered.    COMPARISON:  06/11/2024.    FINDINGS:  Exam quality is limited by motion and streak artifact.    Generalized cerebral volume loss and chronic ischemic changes as seen previously.    No evidence of acute territorial infarct, hemorrhage, mass effect, or midline shift.    Ventricles are prominent and stable when compared with the prior study.  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    No displaced calvarial fracture.    Visualized paranasal sinuses and mastoid air cells are essentially clear.                                       Medications   allopurinol split tablet 50 mg (50 mg Oral Given 6/22/24 0845)   apixaban tablet 5 mg (5 mg Oral Given 6/22/24 0841)   atorvastatin tablet 80 mg (80 mg Oral Given 6/22/24 0841)   calcitRIOL capsule 0.5 mcg (0.5 mcg Oral Given 6/22/24 0840)   calcium acetate(phosphat bind) capsule 667 mg (667 mg Oral Given 6/22/24 1640)   clopidogreL tablet 75 mg (75 mg Oral Given 6/22/24 0841)   famotidine tablet 20 mg (20 mg Oral Given 6/22/24 0841)   sodium  bicarbonate tablet 650 mg (650 mg Oral Given 6/22/24 1549)   hydrALAZINE tablet 50 mg (50 mg Oral Not Given 6/22/24 1500)   sodium chloride 0.9% flush 10 mL (has no administration in time range)   naloxone 0.4 mg/mL injection 0.02 mg (has no administration in time range)   glucose chewable tablet 16 g (has no administration in time range)   glucose chewable tablet 24 g (has no administration in time range)   glucagon (human recombinant) injection 1 mg (has no administration in time range)   acetaminophen tablet 650 mg (has no administration in time range)   acetaminophen tablet 1,000 mg (1,000 mg Oral Given 6/22/24 1340)   prochlorperazine injection Soln 5 mg (has no administration in time range)   melatonin tablet 6 mg (has no administration in time range)   insulin aspart U-100 pen 0-5 Units (2 Units Subcutaneous Given 6/22/24 1647)   dextrose 10% bolus 125 mL 125 mL (has no administration in time range)   dextrose 10% bolus 250 mL 250 mL (has no administration in time range)   carvediloL tablet 6.25 mg (0 mg Oral Hold 6/22/24 0900)   cefTRIAXone (Rocephin) 1 g in D5W 100 mL IVPB (MB+) (has no administration in time range)   0.9%  NaCl infusion (has no administration in time range)   cefTRIAXone (Rocephin) 1 g in D5W 100 mL IVPB (MB+) (0 g Intravenous Stopped 6/22/24 0114)     Medical Decision Making  57yo F yo presents with encephalopathy suspect likely secondary to UTI however signed out to oncoming physician pending UA and plan for admission for further workup and management of encephalopathy.     Large differential diagnosis including meningitis encephalitis, CVA infectious or metabolic.    Given History, Physical, and Workup there is no overt concern for a dangerous emergent cause such as, but not limited to, CNS infection, severe Toxidrome, severe metabolic derangement, or stroke.    Disposition: Admit; the patient is suffering altered mental status that is persistent and therefore they will be admitted.      No LP: Given patient afebrile, without nuchal rigidity or concerning skin findings have lower suspicion for CNS infection and will defer LP at this time.      Amount and/or Complexity of Data Reviewed  Labs:  Decision-making details documented in ED Course.  Radiology: ordered.               ED Course as of 06/22/24 1755 Fri Jun 21, 2024   1840 58 y.o. male with PMHx of ESRD on HD MFW, b/l AKAs, CAD s/p CABG, afib on eliquis, HTN presents with confusion and concern for urinary tract infection.  Afebrile.  Vital signs reassuring.  Patient is confused but otherwise nonfocal neurologic exam. [BD]   1851 Glucose(!): 50 [BD]   2006 Patient hyperglycemia to 50 given some juice and some dextrose.  And will repeat.  Labs otherwise show mild hyponatremia and anemia which are at baseline.  No leukocytosis.  Creatinine is improved at 2.2 [BD]      ED Course User Index  [BD] Hossein Noonan MD                           Clinical Impression:  Final diagnoses:  [G93.40] Encephalopathy (Primary)  [N39.0, R31.9] Urinary tract infection with hematuria, site unspecified  [E16.2] Hypoglycemia          ED Disposition Condition    Observation Stable                Hossein Noonan MD  06/22/24 1755

## 2024-06-22 LAB
ALBUMIN SERPL BCP-MCNC: 2.6 G/DL (ref 3.5–5.2)
ALP SERPL-CCNC: 119 U/L (ref 55–135)
ALT SERPL W/O P-5'-P-CCNC: 11 U/L (ref 10–44)
ANION GAP SERPL CALC-SCNC: 12 MMOL/L (ref 8–16)
AST SERPL-CCNC: 20 U/L (ref 10–40)
BASOPHILS # BLD AUTO: 0.03 K/UL (ref 0–0.2)
BASOPHILS NFR BLD: 1 % (ref 0–1.9)
BILIRUB SERPL-MCNC: 0.7 MG/DL (ref 0.1–1)
BUN SERPL-MCNC: 28 MG/DL (ref 6–20)
CALCIUM SERPL-MCNC: 8.4 MG/DL (ref 8.7–10.5)
CHLORIDE SERPL-SCNC: 94 MMOL/L (ref 95–110)
CO2 SERPL-SCNC: 24 MMOL/L (ref 23–29)
CREAT SERPL-MCNC: 3.5 MG/DL (ref 0.5–1.4)
DIFFERENTIAL METHOD BLD: ABNORMAL
EOSINOPHIL # BLD AUTO: 0.1 K/UL (ref 0–0.5)
EOSINOPHIL NFR BLD: 1.9 % (ref 0–8)
ERYTHROCYTE [DISTWIDTH] IN BLOOD BY AUTOMATED COUNT: 18.2 % (ref 11.5–14.5)
EST. GFR  (NO RACE VARIABLE): 14.6 ML/MIN/1.73 M^2
FERRITIN SERPL-MCNC: 757 NG/ML (ref 20–300)
FOLATE SERPL-MCNC: 9 NG/ML (ref 4–24)
GLUCOSE SERPL-MCNC: 157 MG/DL (ref 70–110)
HCT VFR BLD AUTO: 30.9 % (ref 37–48.5)
HGB BLD-MCNC: 9.8 G/DL (ref 12–16)
IMM GRANULOCYTES # BLD AUTO: 0.01 K/UL (ref 0–0.04)
IMM GRANULOCYTES NFR BLD AUTO: 0.3 % (ref 0–0.5)
IRON SERPL-MCNC: 94 UG/DL (ref 30–160)
LYMPHOCYTES # BLD AUTO: 0.6 K/UL (ref 1–4.8)
LYMPHOCYTES NFR BLD: 18 % (ref 18–48)
MAGNESIUM SERPL-MCNC: 2.1 MG/DL (ref 1.6–2.6)
MCH RBC QN AUTO: 32.6 PG (ref 27–31)
MCHC RBC AUTO-ENTMCNC: 31.7 G/DL (ref 32–36)
MCV RBC AUTO: 103 FL (ref 82–98)
MONOCYTES # BLD AUTO: 0.6 K/UL (ref 0.3–1)
MONOCYTES NFR BLD: 18.3 % (ref 4–15)
NEUTROPHILS # BLD AUTO: 1.9 K/UL (ref 1.8–7.7)
NEUTROPHILS NFR BLD: 60.5 % (ref 38–73)
NRBC BLD-RTO: 0 /100 WBC
OSMOLALITY SERPL: 302 MOSM/KG (ref 275–295)
PLATELET # BLD AUTO: 147 K/UL (ref 150–450)
PMV BLD AUTO: 10.3 FL (ref 9.2–12.9)
POCT GLUCOSE: 162 MG/DL (ref 70–110)
POCT GLUCOSE: 192 MG/DL (ref 70–110)
POCT GLUCOSE: 199 MG/DL (ref 70–110)
POCT GLUCOSE: 202 MG/DL (ref 70–110)
POTASSIUM SERPL-SCNC: 4.2 MMOL/L (ref 3.5–5.1)
PROT SERPL-MCNC: 7.2 G/DL (ref 6–8.4)
RBC # BLD AUTO: 3.01 M/UL (ref 4–5.4)
SATURATED IRON: 35 % (ref 20–50)
SODIUM SERPL-SCNC: 130 MMOL/L (ref 136–145)
TOTAL IRON BINDING CAPACITY: 265 UG/DL (ref 250–450)
TRANSFERRIN SERPL-MCNC: 179 MG/DL (ref 200–375)
VIT B12 SERPL-MCNC: 892 PG/ML (ref 210–950)
WBC # BLD AUTO: 3.11 K/UL (ref 3.9–12.7)

## 2024-06-22 PROCEDURE — 36415 COLL VENOUS BLD VENIPUNCTURE: CPT | Mod: HCNC

## 2024-06-22 PROCEDURE — 82607 VITAMIN B-12: CPT | Mod: HCNC

## 2024-06-22 PROCEDURE — 11000001 HC ACUTE MED/SURG PRIVATE ROOM: Mod: HCNC

## 2024-06-22 PROCEDURE — 82728 ASSAY OF FERRITIN: CPT | Mod: HCNC

## 2024-06-22 PROCEDURE — 83930 ASSAY OF BLOOD OSMOLALITY: CPT | Mod: HCNC | Performed by: NURSE PRACTITIONER

## 2024-06-22 PROCEDURE — 85025 COMPLETE CBC W/AUTO DIFF WBC: CPT | Mod: HCNC | Performed by: NURSE PRACTITIONER

## 2024-06-22 PROCEDURE — 99214 OFFICE O/P EST MOD 30 MIN: CPT | Mod: HCNC,,, | Performed by: NURSE PRACTITIONER

## 2024-06-22 PROCEDURE — 63600175 PHARM REV CODE 636 W HCPCS: Mod: HCNC | Performed by: EMERGENCY MEDICINE

## 2024-06-22 PROCEDURE — 63600175 PHARM REV CODE 636 W HCPCS: Mod: HCNC | Performed by: NURSE PRACTITIONER

## 2024-06-22 PROCEDURE — 36415 COLL VENOUS BLD VENIPUNCTURE: CPT | Mod: HCNC | Performed by: NURSE PRACTITIONER

## 2024-06-22 PROCEDURE — 96372 THER/PROPH/DIAG INJ SC/IM: CPT | Performed by: NURSE PRACTITIONER

## 2024-06-22 PROCEDURE — 80053 COMPREHEN METABOLIC PANEL: CPT | Mod: HCNC | Performed by: NURSE PRACTITIONER

## 2024-06-22 PROCEDURE — 25000003 PHARM REV CODE 250: Mod: HCNC | Performed by: NURSE PRACTITIONER

## 2024-06-22 PROCEDURE — 82746 ASSAY OF FOLIC ACID SERUM: CPT | Mod: HCNC

## 2024-06-22 PROCEDURE — 25000003 PHARM REV CODE 250: Mod: HCNC | Performed by: EMERGENCY MEDICINE

## 2024-06-22 PROCEDURE — 21400001 HC TELEMETRY ROOM: Mod: HCNC

## 2024-06-22 PROCEDURE — 96365 THER/PROPH/DIAG IV INF INIT: CPT | Mod: HCNC

## 2024-06-22 PROCEDURE — 83540 ASSAY OF IRON: CPT | Mod: HCNC

## 2024-06-22 PROCEDURE — 83735 ASSAY OF MAGNESIUM: CPT | Mod: HCNC | Performed by: NURSE PRACTITIONER

## 2024-06-22 RX ORDER — SODIUM CHLORIDE 9 MG/ML
INJECTION, SOLUTION INTRAVENOUS ONCE
Status: COMPLETED | OUTPATIENT
Start: 2024-06-24 | End: 2024-06-24

## 2024-06-22 RX ADMIN — ACETAMINOPHEN 1000 MG: 500 TABLET ORAL at 08:06

## 2024-06-22 RX ADMIN — CEFTRIAXONE 1 G: 1 INJECTION, POWDER, FOR SOLUTION INTRAMUSCULAR; INTRAVENOUS at 11:06

## 2024-06-22 RX ADMIN — CLOPIDOGREL BISULFATE 75 MG: 75 TABLET ORAL at 08:06

## 2024-06-22 RX ADMIN — SODIUM BICARBONATE 650 MG TABLET 650 MG: at 10:06

## 2024-06-22 RX ADMIN — CALCIUM ACETATE 667 MG: 667 CAPSULE ORAL at 11:06

## 2024-06-22 RX ADMIN — APIXABAN 5 MG: 5 TABLET, FILM COATED ORAL at 10:06

## 2024-06-22 RX ADMIN — CARVEDILOL 6.25 MG: 6.25 TABLET, FILM COATED ORAL at 10:06

## 2024-06-22 RX ADMIN — SODIUM BICARBONATE 650 MG TABLET 650 MG: at 03:06

## 2024-06-22 RX ADMIN — ALLOPURINOL 50 MG: 300 TABLET ORAL at 08:06

## 2024-06-22 RX ADMIN — CALCIUM ACETATE 667 MG: 667 CAPSULE ORAL at 04:06

## 2024-06-22 RX ADMIN — CEFTRIAXONE 1 G: 1 INJECTION, POWDER, FOR SOLUTION INTRAMUSCULAR; INTRAVENOUS at 12:06

## 2024-06-22 RX ADMIN — FAMOTIDINE 20 MG: 20 TABLET ORAL at 08:06

## 2024-06-22 RX ADMIN — APIXABAN 5 MG: 5 TABLET, FILM COATED ORAL at 08:06

## 2024-06-22 RX ADMIN — ATORVASTATIN CALCIUM 80 MG: 40 TABLET, FILM COATED ORAL at 08:06

## 2024-06-22 RX ADMIN — SODIUM BICARBONATE 650 MG TABLET 650 MG: at 08:06

## 2024-06-22 RX ADMIN — HYDRALAZINE HYDROCHLORIDE 50 MG: 50 TABLET ORAL at 10:06

## 2024-06-22 RX ADMIN — CALCIUM ACETATE 667 MG: 667 CAPSULE ORAL at 08:06

## 2024-06-22 RX ADMIN — CALCITRIOL 0.5 MCG: 0.5 CAPSULE, LIQUID FILLED ORAL at 08:06

## 2024-06-22 RX ADMIN — INSULIN ASPART 2 UNITS: 100 INJECTION, SOLUTION INTRAVENOUS; SUBCUTANEOUS at 04:06

## 2024-06-22 RX ADMIN — ACETAMINOPHEN 1000 MG: 500 TABLET ORAL at 01:06

## 2024-06-22 NOTE — ED NOTES
Nurses Note -- 4 Eyes      6/21/2024   10:49 PM      Skin assessed during: Admit      [] No Altered Skin Integrity Present    [x]Prevention Measures Documented : Spoke to spouse about turning schedule and he stated he is aware.       [] Yes- Altered Skin Integrity Present or Discovered   [x] LDA Added if Not in Epic (Describe Wound)   [x] New Altered Skin Integrity was Present on Admit and Documented in LDA   [x] Wound Image Taken    Wound Care Consulted? No    Attending Nurse:  DEANGELO Portillo    Second RN/Staff Member:   Dr. Saran MD.

## 2024-06-22 NOTE — PLAN OF CARE
[facility] - Observation 11H  Initial Discharge Assessment       Primary Care Provider: Feliciano Nguyen III, PA-C    Admission Diagnosis: Hypoglycemia [E16.2]  Encephalopathy [G93.40]  Chest pain [R07.9]  Urinary tract infection with hematuria, site unspecified [N39.0, R31.9]    Admission Date: 6/21/2024  Expected Discharge Date:      Patient would like nursing home placement. Patient's  stated that he has a place tht he has toured and they have started all paperwork for the patient. Patient's  stated that he was told they only needed a award letter from social security.     Transition of Care Barriers: (P) None    Payor: [employer] MEDICARE / Plan: [employer] HMO PPO SPECIAL NEEDS / Product Type: Medicare Advantage /     Extended Emergency Contact Information  Primary Emergency Contact: Randell Connelly  Address: [address] [place] [address]  Mobile Phone: [phone]  Relation: Spouse  Secondary Emergency Contact: Cornelia Connelly  Address: [address] [place] [address]  Home Phone: [phone]  Relation: Daughter    Discharge Plan A: (P) New Nursing Home placement - [facility] care facility  Discharge Plan B: (P) Home, Home with family, Home Health      [id number] [address] AT [facility] [address]  [address]  [facility] [address]  Phone: [phone] Fax: [phone]      Initial Assessment (most recent)       Adult Discharge Assessment - 06/22/24 0145          Discharge Assessment    Assessment Type Discharge Planning Assessment (P)      Confirmed/corrected address, phone number and insurance Yes (P)      Confirmed Demographics Correct on Facesheet (P)      Source of Information family (P)      Does patient/caregiver understand observation status Yes (P)      Communicated DEVAN with patient/caregiver Yes (P)      People in Home spouse (P)      Facility  Arrived From: Home (P)      Do you expect to return to your current living situation? Yes (P)      Do you have help at home or someone to help you manage your care at home? Yes (P)      Who are your caregiver(s) and their phone number(s)?  (P)      Prior to hospitilization cognitive status: Unable to Assess (P)      Current cognitive status: Unable to Assess (P)      Walking or Climbing Stairs Difficulty yes (P)      Walking or Climbing Stairs ambulation difficulty, dependent (P)      Dressing/Bathing Difficulty yes (P)      Dressing/Bathing bathing difficulty, dependent (P)      Home Accessibility wheelchair accessible (P)      Home Layout Able to live on 1st floor (P)      Equipment Currently Used at Home wheelchair (P)      Patient currently being followed by outpatient case management? Unable to determine (comments) (P)      Do you currently have service(s) that help you manage your care at home? No (P)      Do you take prescription medications? Yes (P)      Do you have prescription coverage? Yes (P)      Coverage medicare (P)      Do you have any problems affording any of your prescribed medications? No (P)      Is the patient taking medications as prescribed? yes (P)      Who is going to help you get home at discharge?  (P)      How do you get to doctors appointments? family or friend will provide (P)      Are you on dialysis? Yes (P)      Do you take coumadin? No (P)      Discharge Plan A New Nursing Home placement - [facility] care facility (P)      Discharge Plan B Home;Home with family;Home Health (P)      DME Needed Upon Discharge  none (P)      Discharge Plan discussed with: Spouse/sig other (P)      Transition of Care Barriers None (P)         Physical Activity    On average, how many days per week do you engage in moderate to strenuous exercise (like a brisk walk)? 0 days (P)      On average, how many minutes do you engage in exercise at this level? 0 min (P)         Financial Resource  Strain    How hard is it for you to pay for the very basics like food, housing, medical care, and heating? Patient declined (P)         Housing Stability    In the last 12 months, was there a time when you were not able to pay the mortgage or rent on time? Patient declined (P)      At any time in the past 12 months, were you homeless or living in a shelter (including now)? Patient declined (P)         Transportation Needs    Has the lack of transportation kept you from medical appointments, meetings, work or from getting things needed for daily living? No (P)         Food Insecurity    Within the past 12 months, you worried that your food would run out before you got the money to buy more. Patient declined (P)      Within the past 12 months, the food you bought just didn't last and you didn't have money to get more. Patient declined (P)         Stress    Do you feel stress - tense, restless, nervous, or anxious, or unable to sleep at night because your mind is troubled all the time - these days? Patient declined (P)         Social Isolation    How often do you feel lonely or isolated from those around you?  Patient declined (P)         Alcohol Use    Q1: How often do you have a drink containing alcohol? Never (P)      Q2: How many drinks containing alcohol do you have on a typical day when you are drinking? Patient does not drink (P)      Q3: How often do you have six or more drinks on one occasion? Never (P)         Utilities    In the past 12 months has the electric, gas, oil, or water company threatened to shut off services in your home? No (P)         Health Literacy    How often do you need to have someone help you when you read instructions, pamphlets, or other written material from your doctor or pharmacy? Always (P)         OTHER    Name(s) of People in Home  (P)

## 2024-06-22 NOTE — ASSESSMENT & PLAN NOTE
Patient has hyponatremia which is uncontrolled,We will aim to correct the sodium by 4-6mEq in 24 hours. We will monitor sodium Daily. The hyponatremia is due to Medications: SSRIs and renal insufficiency. We will obtain the following studies: Urine sodium, urine osmolality, serum osmolality. We will treat the hyponatremia with Hemodialysis and Removal of offending medications. The patient's sodium results have been reviewed and are listed below.  Recent Labs   Lab 06/22/24  0649   *     - patient with chronic hyponatremia, holding zoloft for now

## 2024-06-22 NOTE — ASSESSMENT & PLAN NOTE
Patient's FSGs are not controlled on current hypoglycemics due to hypoglycemia.   Last A1c reviewed-   Lab Results   Component Value Date    HGBA1C 8.0 (H) 04/24/2024     Most recent fingerstick glucose reviewed-   Recent Labs   Lab 06/21/24 2016 06/21/24  2329   POCTGLUCOSE 150* 134*     Current correctional scale  Low  Maintain anti-hyperglycemic dose as follows-   Antihyperglycemics (From admission, onward)      Start     Stop Route Frequency Ordered    06/22/24 0032  insulin aspart U-100 pen 0-5 Units         -- SubQ Before meals & nightly PRN 06/21/24 2332        -Pt initially hypoglycemic which improved with D10 bolus.   -Accuchecks AC/HS

## 2024-06-22 NOTE — PROVIDER PROGRESS NOTES - EMERGENCY DEPT.
Patient was signed out pending head CT and UA and subsequently admission to hospital medicine for acute encephalitis.  UA shows urinary tract infection, likely the cause of her acute encephalopathy.  CT head negative.  Based on prior cultures, patient was started on Rocephin.  Admitted to Hospital Medicine.

## 2024-06-22 NOTE — ASSESSMENT & PLAN NOTE
58 y.o. Black or  Female ESRD-HD M-W-F presents to ED on 6/21/2024 with diagnosis of: Hypoglycemia [E16.2];Encephalopathy [G93.40];Chest pain [R07.9];Urinary tract infection with hematuria, site unspecified [N39.0, R31.9]   Nephrology consulted for inpatient ESRD-HD management    Outpatient HD Information:  -Dialysis modality: Hemodialysis  -Outpatient HD unit: [facility]  -Nephrologist: ?  -HD TX days: Monday/Wednesday/Friday, duration of treatment: 3 hrs   -Last HD TX prior to hospital admission: 6/21/24  -Dialysis access: dialysis catheter   -Residual urine: Minium  -EDW: 67 kg     Assessment:   - Dialysis for metabolic clearance and volume management will be provided Monday AM.  - Continue to monitor intake and output  - Please avoid gadolinium, fleets, phos-based laxatives, NSAIDs  - Dialysis thrice weekly unless more urgent indications arise. Will evaluate RRT requirements Daily.    Anemia of ESRD   Recent Labs   Lab 06/19/24  0000 06/21/24  1710 06/22/24  0649   WBC  --  3.80* 3.11*   HGB 9.7* 11.0* 9.8*   HCT  --  34.7* 30.9*   PLT  --  166 147*     Lab Results   Component Value Date    FESATURATED 35 06/22/2024    FERRITIN 757 (H) 06/22/2024       - Goal in ESRD is Hgb of 10-11. Near target.   - EPO can be administered and dosed per his OP unit upon discharge.    Mineral Bone Disease in ESRD   Lab Results   Component Value Date    PTH 1,132 (H) 06/17/2024    CALCIUM 8.4 (L) 06/22/2024    ALBUMIN 2.6 (L) 06/22/2024    CAION 0.92 (L) 05/17/2023    PHOS 4.7 (H) 06/13/2024       - F/U PO4, Mg, Calcium. And albumin levels daily.   - Renal diet with protein intake goal 1.5 g/kg/d with 1 L fluid restriction   - Novasource with meals  - Restart home phos binder, phos 4.7

## 2024-06-22 NOTE — PROGRESS NOTES
[facility] - Observation [address] [place] [address] Medicine  Progress Note    Patient Name: Suyapa Connelly  MRN: [id number]  Patient Class: OP- Observation   Admission Date: 6/21/2024  Length of Stay: 0 days  Attending Physician: Frances Bee MD  Primary Care Provider: Feliciano Nguyen III, PA-C        Subjective:     Principal Problem:Acute encephalopathy        HPI:  Suyapa Connelly is a 58 y.o. female with a PMHx of HTN, GERD, HLD, PVD, ESRD on HD MFW, b/l AKAs, CAD s/p CABG, afib on eliquis, depression, and anxiety who presents to the ED for evaluation of confusion. HPI and ROS limited secondary to patient's mental status and poor participation. Per patient's  she has been more sleepy and less interactive over the past several days. He reports that her urine has been foul-smelling and more cloudy. He states she was also peeing more frequently in complaining of burning with urination and some occasional suprapubic pain. Denies any fevers or chills. The patient denies CP, SOB, abdominal pain, or dysuria. Per  patient was dialyzed today a full dialysis and they came straight here after.     In the ED, VSS, afebrile. CBC with stable leukopenia and anemia. Na 129, Chloride 94, Bicarb 22, Glucose 50. POC glucose 150. Cr 2.7 (consistent with ESRD). Albumin 3.2. Tbili 1.1. Mag 2.1. UA with 3+ leukocytosis, 74 WBCs, but no bacteria. CTH with no acute finding or detrimental change when compared with 06/11/2024, allowing for motion and artifact limitations. The patient received 1g IV Rocephin.    Overview/Hospital Course:  Suyapa Connelly is a 58 y.o. F who was admitted to  for further evaluation of acute encephalopathy. Most likely 2/2 UTI, other workup negative. AAOx3, but  stating patient not at baseline. Initiated rocephin for infectious appearing UA. Following culture.      Interval History: Patient seen and assessed at bedside with  present. Afebrile, VSS. AAOx3, but  stating patient  still not at baseline. Continuing treatment with rocephin, following urine culture.       Review of Systems   Unable to perform ROS: Mental status change   Respiratory:  Negative for shortness of breath.    Cardiovascular:  Negative for chest pain.   Gastrointestinal:  Negative for abdominal pain and nausea.   Genitourinary:  Negative for dysuria and frequency.     Objective:     Vital Signs (Most Recent):  Temp: 98.4 °F (36.9 °C) (06/22/24 1141)  Pulse: 80 (06/22/24 1141)  Resp: 18 (06/22/24 1141)  BP: (!) 121/56 (06/22/24 1141)  SpO2: 97 % (06/22/24 1141) Vital Signs (24h Range):  Temp:  [97.4 °F (36.3 °C)-98.4 °F (36.9 °C)] 98.4 °F (36.9 °C)  Pulse:  [72-87] 80  Resp:  [11-20] 18  SpO2:  [92 %-97 %] 97 %  BP: (114-171)/(56-99) 121/56     Weight: 74.2 kg (163 lb 9.3 oz)  Body mass index is 49.92 kg/m².    Intake/Output Summary (Last 24 hours) at 6/22/2024 1227  Last data filed at 6/21/2024 1918  Gross per 24 hour   Intake 125 ml   Output --   Net 125 ml         Physical Exam  Vitals and nursing note reviewed.   Constitutional:       Appearance: She is not toxic-appearing or diaphoretic.      Comments: Chronically ill appearing   HENT:      Head: Normocephalic and atraumatic.      Nose: Nose normal.      Mouth/Throat:      Mouth: Mucous membranes are moist.   Eyes:      Pupils: Pupils are equal, round, and reactive to light.   Cardiovascular:      Rate and Rhythm: Normal rate and regular rhythm.      Pulses: Normal pulses.   Pulmonary:      Effort: Pulmonary effort is normal. No respiratory distress.      Breath sounds: No wheezing, rhonchi or rales.   Abdominal:      General: Bowel sounds are normal. There is no distension.      Palpations: Abdomen is soft.      Tenderness: There is abdominal tenderness (RLQ/suprapubic). There is no guarding.   Musculoskeletal:         General: Normal range of motion.      Cervical back: Normal range of motion.      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       Left Lower Extremity: Left leg is amputated above knee.   Skin:     General: Skin is warm and dry.      Capillary Refill: Capillary refill takes less than 2 seconds.   Neurological:      General: No focal deficit present.      Mental Status: She is alert.      Cranial Nerves: No facial asymmetry.      Motor: No tremor or seizure activity.      Comments: AAOx3. Will nod yes or no, occasionally answer questions, and follows basic commands. Minimally participatory with examination.   Psychiatric:         Mood and Affect: Affect is tearful.         Speech: She is noncommunicative.         Behavior: Behavior is cooperative.             Significant Labs: All pertinent labs within the past 24 hours have been reviewed.    Significant Imaging: I have reviewed all pertinent imaging results/findings within the past 24 hours.    Assessment/Plan:      * Acute encephalopathy  Patient has acute metabolic encephalopathy that likely secondary to UTI.Treatment for underlying cause is underway. Pt with recurrent admissions for encephalopathy with extensive work ups including EEG, CTH, MRI brain, and vitamin panels. Will monitor neuro status carefully, avoid narcotics and benzos that will exacerbate agitation, and use PRN anti-psychotics for controls of behavior for self harm.  -Initially hypoglycemic which improved with treatment. Na 129.  -UA concerning for infection.  -CXR with diffuse increased interstitial and parenchymal attenuation similar to prior study. No large region of confluent airspace consolidation or substantial volume of pleural fluid identified.   -CTH-negative  -Delirium precautions   - reports progressive decline in patient over the last few months. Initiated NH paperwork for patient. CM notified for assistance     Severe obesity (BMI >= 40)  Body mass index is 49.92 kg/m². Morbid obesity complicates all aspects of disease management from diagnostic modalities to treatment.     ESRD (end stage renal  disease)  Chronic kidney disease-mineral and bone disorder   MWF schedule, last dialyzed 6/21  Residual renal function?- Yes    - Nephrology consulted  - Continue chronic hemodialysis  - Monitor daily electrolytes and defer dialysis orders to nephrology  - Renally dose medications  - Renal diet  - Continue phosphorus binders  - Daily weights/strict I&Os  - 1.5L FR    Debility  Patient with Chronic debility due to other reduced mobility. Latest AMPAC and GEMS scores have not been reviewed. Plan includes progressive mobility protocol initated and fall precautions in place.  - family initiated NH paperwork  - CM notified    S/P AKA (above knee amputation) bilateral  History noted.  -Fall precautions.    Chronic combined systolic and diastolic heart failure  Patient is identified as having Combined Systolic and Diastolic heart failure that is Chronic. CHF is currently controlled. Latest ECHO performed and demonstrates- Results for orders placed during the hospital encounter of 05/07/24    Echo    Interpretation Summary    Left Ventricle: The left ventricle is mildly dilated. Ventricular mass is normal. Normal wall thickness. Severe global hypokinesis present. There is severely reduced systolic function. Ejection fraction by visual approximation is 15%. There is diastolic dysfunction.    Right Ventricle: Severe right ventricular enlargement. Wall thickness is normal. Right ventricle wall motion has global hypokinesis. Systolic function is mildly reduced.    Left Atrium: Left atrium is severely dilated. There is an atrial septal aneurysm.    Right Atrium: Right atrium is dilated.    Aortic Valve: There is mild aortic regurgitation.    Mitral Valve: There is mild regurgitation.    Tricuspid Valve: There is annular dilation present. There is normal leaflet mobility. There is severe functional regurgitation.    Pulmonary Artery: The estimated pulmonary artery systolic pressure is at least 24 mmHg. PASP is likely  "under-estimated in the setting of severe tricuspid regurgitation.    IVC/SVC: Central venous pressure of at least 8 mmHg.    No vegetation seen.  . Continue Beta Blocker and monitor clinical status closely. Monitor on telemetry. Patient is off CHF pathway.  Monitor strict Is&Os and daily weights.  Place on fluid restriction of 1.5 L. Continue to stress to patient importance of self efficacy and  on diet for CHF. Last BNP reviewed- and noted below No results for input(s): "BNP", "BNPTRIAGEBLO" in the last 168 hours..  -Volume management with HD.      Hyponatremia  Patient has hyponatremia which is uncontrolled,We will aim to correct the sodium by 4-6mEq in 24 hours. We will monitor sodium Daily. The hyponatremia is due to Medications: SSRIs and renal insufficiency. We will obtain the following studies: Urine sodium, urine osmolality, serum osmolality. We will treat the hyponatremia with Hemodialysis and Removal of offending medications. The patient's sodium results have been reviewed and are listed below.  Recent Labs   Lab 06/22/24  0649   *     - patient with chronic hyponatremia, holding zoloft for now     Paroxysmal atrial fibrillation  Patient with Paroxysmal (<7 days) atrial fibrillation which is controlled currently with Beta Blocker. Patient is currently in sinus rhythm.[id number] Score: 3. Anticoagulation indicated. Anticoagulation done with eliquis .    Anemia due to chronic kidney disease, on chronic dialysis  Patient's anemia is currently controlled. Has not received any PRBCs to date. Etiology likely d/t chronic disease due to ESRD  Current CBC reviewed-   Lab Results   Component Value Date    HGB 9.8 (L) 06/22/2024    HCT 30.9 (L) 06/22/2024     Monitor serial CBC and transfuse if patient becomes hemodynamically unstable, symptomatic or H/H drops below 7/21.    CAD (coronary artery disease)  Patient with known CAD s/p CABG, which is controlled Will continue Plavix and Statin and monitor for " S/Sx of angina/ACS. Continue to monitor on telemetry.     Type 2 diabetes mellitus with hyperglycemia, with long-term current use of insulin  Patient's FSGs are not controlled on current hypoglycemics due to hypoglycemia.   Last A1c reviewed-   Lab Results   Component Value Date    HGBA1C 8.0 (H) 04/24/2024     Most recent fingerstick glucose reviewed-   Recent Labs   Lab 06/21/24  2016 06/21/24  2329 06/22/24  0737 06/22/24  1157   POCTGLUCOSE 150* 134* 192* 199*       Current correctional scale  Low  Maintain anti-hyperglycemic dose as follows-   Antihyperglycemics (From admission, onward)      Start     Stop Route Frequency Ordered    06/22/24 0032  insulin aspart U-100 pen 0-5 Units         -- SubQ Before meals & nightly PRN 06/21/24 2332        -Pt initially hypoglycemic which improved with D10 bolus.   -Accuchecks AC/HS    Acute cystitis without hematuria  -Pt denies any abd pain, dysuria, urinary frequency, or fever/chills. Per  patient's urine has been foul smelling and cloudy. Per  patient has also been complaining of suprapubic pain and dysuria at home.  -Afebrile, WBC 3.80 (chronic leukopenia).  -UA reviewed, follow urine cx.  -Pt received IV rocephin in the ED, continue for now.    CAROLIN (generalized anxiety disorder)  -Chronic, uncontrolled.  -Hold zoloft for now pending hyponatremia work up.   -Can consider psychiatry consult.    Peripheral vascular disease  -Chronic issue.  -Continue plavix and statin.      VTE Risk Mitigation (From admission, onward)           Ordered     apixaban tablet 5 mg  2 times daily         06/21/24 2332     IP VTE HIGH RISK PATIENT  Once         06/21/24 2332     Place sequential compression device  Until discontinued         06/21/24 2332     Reason for No Pharmacological VTE Prophylaxis  Once        Question:  Reasons:  Answer:  Already adequately anticoagulated on oral Anticoagulants    06/21/24 2332                    Discharge Planning   DEVAN:      Code  Status: Full Code   Is the patient medically ready for discharge?:     Reason for patient still in hospital (select all that apply): Patient trending condition, Laboratory test, and Treatment  Discharge Plan A: New Nursing Home placement - detention care facility                  Viry Griffin PA-C  Department of Hospital Medicine   [facility] - Observation 11H

## 2024-06-22 NOTE — ASSESSMENT & PLAN NOTE
Patient with Paroxysmal (<7 days) atrial fibrillation which is controlled currently with Beta Blocker. Patient is currently in sinus rhythm.[id number] Score: 3. Anticoagulation indicated. Anticoagulation done with eliquis .

## 2024-06-22 NOTE — HOSPITAL COURSE
Suyapa Connelly is a 58 y.o. F who was admitted to  for further evaluation of acute encephalopathy. Most likely 2/2 UTI, other workup negative. AAOx3 now. Initiated rocephin for infectious appearing UA. Following culture - grew Candida krusei, stopped rocephin. ID consulted for recommendations. Recommend deferring treatment, obtain CT abd/pelvis. CT with findings of volume overload, otherwise no acute intraabdominal abnormality. Given x1 120mg IV lasix. Repeating UA with straight cath and following cx per ID recs. Nephrology following for HD management. Psych consulted for recommendations: continue zoloft, add trazodone qhs. CM assisting with discharge planning - NH declined admission.  DC home commenced with HH, DME, and close case management oversight.

## 2024-06-22 NOTE — PLAN OF CARE
Problem: Infection  Goal: Absence of Infection Signs and Symptoms  Outcome: Progressing     Problem: Adult Inpatient Plan of Care  Goal: Plan of Care Review  Outcome: Progressing  Goal: Patient-Specific Goal (Individualized)  Outcome: Progressing  Goal: Absence of Hospital-Acquired Illness or Injury  Outcome: Progressing  Goal: Optimal Comfort and Wellbeing  Outcome: Progressing  Goal: Readiness for Transition of Care  Outcome: Progressing     Problem: Bariatric Environmental Safety  Goal: Safety Maintained with Care  Outcome: Progressing     Problem: Skin Injury Risk Increased  Goal: Skin Health and Integrity  Outcome: Progressing     Problem: Diabetes Comorbidity  Goal: Blood Glucose Level Within Targeted Range  Outcome: Progressing     Problem: Sepsis/Septic Shock  Goal: Optimal Coping  Outcome: Progressing  Goal: Absence of Bleeding  Outcome: Progressing  Goal: Blood Glucose Level Within Targeted Range  Outcome: Progressing  Goal: Absence of Infection Signs and Symptoms  Outcome: Progressing  Goal: Optimal Nutrition Intake  Outcome: Progressing     Problem: Wound  Goal: Optimal Coping  Outcome: Progressing  Goal: Optimal Functional Ability  Outcome: Progressing  Goal: Absence of Infection Signs and Symptoms  Outcome: Progressing  Goal: Improved Oral Intake  Outcome: Progressing  Goal: Optimal Pain Control and Function  Outcome: Progressing  Goal: Skin Health and Integrity  Outcome: Progressing  Goal: Optimal Wound Healing  Outcome: Progressing     Problem: Fall Injury Risk  Goal: Absence of Fall and Fall-Related Injury  Outcome: Progressing     Problem: Pain Acute  Goal: Optimal Pain Control and Function  Outcome: Progressing

## 2024-06-22 NOTE — ASSESSMENT & PLAN NOTE
Patient has hyponatremia which is uncontrolled,We will aim to correct the sodium by 4-6mEq in 24 hours. We will monitor sodium Daily. The hyponatremia is due to Medications: SSRIs and renal insufficiency. We will obtain the following studies: Urine sodium, urine osmolality, serum osmolality. We will treat the hyponatremia with Hemodialysis and Removal of offending medications. The patient's sodium results have been reviewed and are listed below.  Recent Labs   Lab 06/21/24  1710   *

## 2024-06-22 NOTE — CONSULTS
[facility] - [facility]  Nephrology  Consult Note    Patient Name: Suyapa Connelly  MRN: [id number]  Admission Date: 6/21/2024  Hospital Length of Stay: 0 days  Attending Provider: Frances Bee MD   Primary Care Physician: Feliciano Nguyen III, PASahilC  Principal Problem:Acute encephalopathy    Inpatient consult to Nephrology  Consult performed by: Sarah Faulkner, DNP, FNP-C  Consult ordered by: Angela Bae, NP  Reason for consult: ESRD        Subjective:     HPI: The patient is a 58 y.o. Black or  Female with multiple co morbidities including HTN, GERD, HLD, PVD, ESRD on HD MFW, b/l AKAs, CAD s/p CABG, afib on eliquis, depression, and anxiety  who presents to ED on 6/21/2024 for evaluation of confusion per  at bedside. Per patient's  she has been more sleepy and less interactive over the past several days. He reports that her urine has been foul-smelling and more cloudy. The patient denies CP, SOB, abdominal pain, or dysuria. Per  patient was dialyzed today a full dialysis and they came straight here after. She had a full HD treatment yesterday, 6/21.     In the ED, VSS, afebrile. CBC with stable leukopenia and anemia. Na 129, Chloride 94, Bicarb 22, Glucose 50. POC glucose 150. Cr 2.7 (consistent with ESRD). Albumin 3.2. Tbili 1.1. Mag 2.1. UA with 3+ leukocytosis, 74 WBCs, but no bacteria. CTH with no acute finding or detrimental change when compared with 06/11/2024, allowing for motion and artifact limitations. The patient received 1g IV Rocephin. Nephrology consulted for management of ESRD and HD treatment.    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    Osteomyelitis     Osteomyelitis of left  foot 04/30/2021    Ulcer of left foot     Vaginal delivery     x1       Past Surgical History:   Procedure Laterality Date    ABOVE-KNEE AMPUTATION Left 5/18/2021    Procedure: AMPUTATION, ABOVE KNEE;  Surgeon: Teddy Huber MD;  Location: [address];  Service: Vascular;  Laterality: Left;    ABOVE-KNEE AMPUTATION Right 3/18/2022    Procedure: AMPUTATION, ABOVE KNEE;  Surgeon: DAYNE Florez II, MD;  Location: [address];  Service: Vascular;  Laterality: Right;    Angiogram - Right Extremity Right 7/9/15    angiogram-left leg  10/6/15    ANGIOGRAPHY OF LOWER EXTREMITY Left 4/29/2021    Procedure: ANGIOGRAM, LOWER EXTREMITY;  Surgeon: Teddy Huber MD;  Location: [address];  Service: Vascular;  Laterality: Left;    BELOW KNEE AMPUTATION OF LOWER EXTREMITY Right 12/28/2021    Procedure: AMPUTATION, BELOW KNEE;  Surgeon: Kaitlyn Rojas MD;  Location: [facility];  Service: General;  Laterality: Right;    CATHETERIZATION OF BOTH LEFT AND RIGHT HEART N/A 12/18/2019    Procedure: CATHETERIZATION, HEART, BOTH LEFT AND RIGHT;  Surgeon: Que Fernando III, MD;  Location: [facility] CATH LAB;  Service: Cardiology;  Laterality: N/A;    CORONARY ANGIOGRAPHY N/A 12/18/2019    Procedure: ANGIOGRAM, CORONARY ARTERY;  Surgeon: Que Fernando III, MD;  Location: [facility] CATH LAB;  Service: Cardiology;  Laterality: N/A;    CORONARY ANGIOGRAPHY INCLUDING BYPASS GRAFTS WITH CATHETERIZATION OF LEFT HEART N/A 7/28/2020    Procedure: ANGIOGRAM, CORONARY, INCLUDING BYPASS GRAFT, WITH LEFT HEART CATHETERIZATION, 9 am;  Surgeon: Rachel Easley MD;  Location: [facility] CATH LAB;  Service: Cardiology;  Laterality: N/A;    CORONARY ARTERY BYPASS GRAFT (CABG) N/A 1/14/2020    Procedure: CORONARY ARTERY BYPASS GRAFT (CABG) x 1     Off Pump;  Surgeon: Huang Altamirano MD;  Location: [address];  Service: Cardiovascular;  Laterality: N/A;    CREATION OF FEMORAL-TIBIAL ARTERY BYPASS Left 4/29/2021    Procedure:  CREATION, BYPASS, ARTERIAL, FEMORAL TO ANTERIOR TIBIAL;  Surgeon: Teddy Huber MD;  [facility];  Service: Vascular;  Laterality: Left;    CREATION OF FEMOROPOPLITEAL ARTERIAL BYPASS USING GRAFT Left 8/18/2020    Procedure: CREATION, BYPASS, ARTERIAL, FEMORAL TO POPLITEAL, USING GRAFT, LEFT LOWER EXTREMITY;  Surgeon: Teddy Huber MD;  Location: [facility] OR;  Service: Vascular;  Laterality: Left;  REQUEST 7:15 A.M. START----COVID NEGATIVE ON 8/17  1ST CASE STARTE PER LEANA ON 8/7/2020 @ 942AM-LO  RN PREOP 8/12/2020   T/S-----CLEARED BY CARDS-------PENDING INSURANCE    DEBRIDEMENT OF FOOT Left 9/8/2020    Procedure: DEBRIDEMENT, FOOT;  Surgeon: Rosio Mayes DPM;  Location: [facility];  Service: Podiatry;  Laterality: Left;  request neoxx .   RN Pre Op 9-4-2020, Covid negative on 9/5/20. C A    DEBRIDEMENT OF FOOT  3/4/2021    Procedure: DEBRIDEMENT, FOOT;  Surgeon: Teddy Huber MD;  Location: [facility] OR;  Service: Vascular;;    DEBRIDEMENT OF FOOT Left 3/9/2021    Procedure: DEBRIDEMENT, FOOT, bone biopsy;  Surgeon: Rosio Mayes DPM;  Location: [facility] OR;  Service: Podiatry;  Laterality: Left;  Request neoxx---COVID IN AM  REQUESTING NOON START  RN Phone Pre op.On Blood thinners Plavix and Eliquis.  Covid am of surgery. C A    DEBRIDEMENT OF FOOT Left 5/4/2021    Procedure: DEBRIDEMENT, FOOT;  Surgeon: Farooq Morley DPM;  [facility];  Service: Podiatry;  Laterality: Left;    INSERTION OF TUNNELED CENTRAL VENOUS HEMODIALYSIS CATHETER N/A 1/27/2020    Procedure: Insertion, Catheter, Central Venous, Hemodialysis;  Surgeon: ESTEBAN Gomez III, MD;  Location: [facility] CATH LAB;  Service: Peripheral Vascular;  Laterality: N/A;    INSERTION OF TUNNELED CENTRAL VENOUS HEMODIALYSIS CATHETER  5/10/2023    Procedure: Insertion, Catheter, Central Venous, Hemodialysis;  Surgeon: Romulo Queen MD;  Location: [facility] CATH LAB;  Service: Cardiology;;    PERCUTANEOUS TRANSLUMINAL  ANGIOPLASTY N/A 3/4/2021    Procedure: PTA (ANGIOPLASTY, PERCUTANEOUS, TRANSLUMINAL);  Surgeon: Teddy Huber MD;  Location: [facility] OR;  Service: Vascular;  Laterality: N/A;    REMOVAL OF ARTERIOVENOUS GRAFT Left 5/27/2021    Procedure: REMOVAL, GRAFT, LEFT LOWER EXTREMITY, WOUND EXPLORATION;  Surgeon: Teddy Huber MD;  Location: [facility] OR 2ND FLR;  Service: Vascular;  Laterality: Left;    REMOVAL OF NAIL OF DIGIT Left 3/9/2021    Procedure: REMOVAL, NAIL, DIGIT;  Surgeon: Rosio Mayes DPM;  Location: [facility] OR;  Service: Podiatry;  Laterality: Left;    RIGHT HEART CATHETERIZATION Right 5/10/2023    Procedure: INSERTION, CATHETER, RIGHT HEART;  Surgeon: Romulo Queen MD;  Location: [facility] CATH LAB;  Service: Cardiology;  Laterality: Right;    THROMBECTOMY Left 3/4/2021    Procedure: THROMBECTOMY, LEFT LOWER EXTREMITY BYPASS GRAFT, ANGIOGRAM, POSSIBLE INTERVENTION, POSSIBLE LEFT LOWER EXTREMITY BYPASS;  Surgeon: Teddy Huber MD;  Location: [facility] OR;  Service: Vascular;  Laterality: Left;    THROMBECTOMY Left 4/29/2021    Procedure: GRAFT THROMBECTOMY, LEFT LOWER EXTREMITY;  Surgeon: Teddy Huber MD;  Location: [facility] OR 2ND FLR;  Service: Vascular;  Laterality: Left;  14.5 min  1179.85 mGy  341.01 Gycm2  240 ml dye    THROMBECTOMY  10/22/2021    Procedure: THROMBECTOMY;  Surgeon: Saad Arenas MD;  Location: [facility] CATH LAB/EP;  Service: Cardiology;;       Review of patient's allergies indicates:   Allergen Reactions    Ciprofloxacin Itching    Iodine      Kidney injury    Pcn [penicillins]      Rash; tolerated ceftriaxone on 1/13/20     Current Facility-Administered Medications   Medication Frequency    [START ON 6/24/2024] 0.9%  NaCl infusion Once    acetaminophen tablet 1,000 mg Q8H PRN    acetaminophen tablet 650 mg Q6H PRN    allopurinol split tablet 50 mg Every other day    apixaban tablet 5 mg BID    atorvastatin tablet 80 mg Daily    calcitRIOL capsule 0.5 mcg Daily    calcium  acetate(phosphat bind) capsule 667 mg TID WM    carvediloL tablet 6.25 mg BID    [START ON 6/23/2024] cefTRIAXone (Rocephin) 1 g in D5W 100 mL IVPB (MB+) Q24H    clopidogreL tablet 75 mg Daily    dextrose 10% bolus 125 mL 125 mL PRN    dextrose 10% bolus 250 mL 250 mL PRN    famotidine tablet 20 mg Daily    glucagon (human recombinant) injection 1 mg PRN    glucose chewable tablet 16 g PRN    glucose chewable tablet 24 g PRN    hydrALAZINE tablet 50 mg TID    insulin aspart U-100 pen 0-5 Units QID (AC + HS) PRN    melatonin tablet 6 mg Nightly PRN    naloxone 0.4 mg/mL injection 0.02 mg PRN    prochlorperazine injection Soln 5 mg Q6H PRN    sodium bicarbonate tablet 650 mg TID    sodium chloride 0.9% flush 10 mL Q12H PRN     Family History       Problem Relation (Age of Onset)    Diabetes Mother, Father, Paternal Grandmother    Heart disease Maternal Grandmother    No Known Problems Maternal Grandfather, Paternal Grandfather          Tobacco Use    Smoking status: Former    Smokeless tobacco: Never   Substance and Sexual Activity    Alcohol use: No    Drug use: Yes     Types: Marijuana     Comment: occassional    Sexual activity: Yes     Partners: Male     Review of Systems   Unable to perform ROS: Mental status change   Eyes:  Negative for visual disturbance.   Respiratory:  Negative for shortness of breath.    Cardiovascular:  Negative for chest pain.   Gastrointestinal:  Negative for abdominal pain and nausea.   Genitourinary:  Negative for dysuria and frequency.     Objective:     Vital Signs (Most Recent):  Temp: 98.4 °F (36.9 °C) (06/22/24 1141)  Pulse: 80 (06/22/24 1141)  Resp: 18 (06/22/24 1141)  BP: (!) 121/56 (06/22/24 1141)  SpO2: 97 % (06/22/24 1141) Vital Signs (24h Range):  Temp:  [97.4 °F (36.3 °C)-98.4 °F (36.9 °C)] 98.4 °F (36.9 °C)  Pulse:  [72-87] 80  Resp:  [11-20] 18  SpO2:  [92 %-97 %] 97 %  BP: (114-171)/(56-99) 121/56     Weight: 74.2 kg (163 lb 9.3 oz) (06/22/24 0400)  Body mass index is  49.92 kg/m².  Body surface area is 1.59 meters squared.    I/O last 3 completed shifts:  In: 125 [IV Piggyback:125]  Out: -      Physical Exam  Vitals and nursing note reviewed.   Constitutional:       Appearance: She is not toxic-appearing or diaphoretic.      Comments: Chronically ill appearing   HENT:      Head: Normocephalic and atraumatic.      Nose: Nose normal.      Mouth/Throat:      Mouth: Mucous membranes are moist.   Eyes:      General: No scleral icterus.  Cardiovascular:      Rate and Rhythm: Normal rate and regular rhythm.      Pulses: Normal pulses.   Pulmonary:      Effort: Pulmonary effort is normal. No respiratory distress.      Breath sounds: No wheezing, rhonchi or rales.   Abdominal:      General: Bowel sounds are normal. There is no distension.      Palpations: Abdomen is soft.      Tenderness: There is abdominal tenderness (RLQ/suprapubic). There is no guarding.   Musculoskeletal:         General: Normal range of motion.      Cervical back: Normal range of motion.      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      Left Lower Extremity: Left leg is amputated above knee.   Skin:     General: Skin is warm and dry.      Capillary Refill: Capillary refill takes less than 2 seconds.   Neurological:      General: No focal deficit present.      Mental Status: She is alert.      Cranial Nerves: No facial asymmetry.      Motor: No tremor or seizure activity.      Comments: AAOx3. Will nod yes or no, occasionally answer questions, and follows basic commands.   Psychiatric:         Mood and Affect: Affect is tearful.         Speech: She is noncommunicative.         Behavior: Behavior is cooperative.          Significant Labs:  CBC:   Recent Labs   Lab 06/22/24  0649   WBC 3.11*   RBC 3.01*   HGB 9.8*   HCT 30.9*   *   *   MCH 32.6*   MCHC 31.7*     CMP:   Recent Labs   Lab 06/22/24  0649   *   CALCIUM 8.4*   ALBUMIN 2.6*   PROT 7.2   *   K 4.2   CO2 24   CL 94*    BUN 28*   CREATININE 3.5*   ALKPHOS 119   ALT 11   AST 20   BILITOT 0.7     All labs within the past 24 hours have been reviewed.  Assessment/Plan:     Neuro  * Acute encephalopathy  - defer TORRES to primary team     Renal/  ESRD (end stage renal disease)  58 y.o. Black or  Female ESRD-HD M-W-F presents to ED on 6/21/2024 with diagnosis of: Hypoglycemia [E16.2];Encephalopathy [G93.40];Chest pain [R07.9];Urinary tract infection with hematuria, site unspecified [N39.0, R31.9]   Nephrology consulted for inpatient ESRD-HD management    Outpatient HD Information:  -Dialysis modality: Hemodialysis  -Outpatient HD unit: [facility]  -Nephrologist: ?  -HD TX days: Monday/Wednesday/Friday, duration of treatment: 3 hrs   -Last HD TX prior to hospital admission: 6/21/24  -Dialysis access: dialysis catheter   -Residual urine: Minium  -EDW: 67 kg     Assessment:   - Dialysis for metabolic clearance and volume management will be provided Monday AM.  - Continue to monitor intake and output  - Please avoid gadolinium, fleets, phos-based laxatives, NSAIDs  - Dialysis thrice weekly unless more urgent indications arise. Will evaluate RRT requirements Daily.    Anemia of ESRD   Recent Labs   Lab 06/19/24  0000 06/21/24  1710 06/22/24  0649   WBC  --  3.80* 3.11*   HGB 9.7* 11.0* 9.8*   HCT  --  34.7* 30.9*   PLT  --  166 147*     Lab Results   Component Value Date    FESATURATED 35 06/22/2024    FERRITIN 757 (H) 06/22/2024       - Goal in ESRD is Hgb of 10-11. Near target.   - EPO can be administered and dosed per his OP unit upon discharge.    Mineral Bone Disease in ESRD   Lab Results   Component Value Date    PTH 1,132 (H) 06/17/2024    CALCIUM 8.4 (L) 06/22/2024    ALBUMIN 2.6 (L) 06/22/2024    CAION 0.92 (L) 05/17/2023    PHOS 4.7 (H) 06/13/2024       - F/U PO4, Mg, Calcium. And albumin levels daily.   - Renal diet with protein intake goal 1.5 g/kg/d with 1 L fluid restriction   - Novasource with meals  - Restart  home phos binder, phos 4.7        Thank you for your consult. I will follow-up with patient. Please contact us if you have any additional questions.    Sarah Faulkner DNP, FNP-C  Nephrology  [facility] - Observation 11H

## 2024-06-22 NOTE — HPI
The patient is a 58 y.o. Black or  Female with multiple co morbidities including HTN, GERD, HLD, PVD, ESRD on HD MFW, b/l AKAs, CAD s/p CABG, afib on eliquis, depression, and anxiety  who presents to ED on 6/21/2024 for evaluation of confusion per  at bedside. Per patient's  she has been more sleepy and less interactive over the past several days. He reports that her urine has been foul-smelling and more cloudy. The patient denies CP, SOB, abdominal pain, or dysuria. Per  patient was dialyzed today a full dialysis and they came straight here after. She had a full HD treatment yesterday, 6/21.     In the ED, VSS, afebrile. CBC with stable leukopenia and anemia. Na 129, Chloride 94, Bicarb 22, Glucose 50. POC glucose 150. Cr 2.7 (consistent with ESRD). Albumin 3.2. Tbili 1.1. Mag 2.1. UA with 3+ leukocytosis, 74 WBCs, but no bacteria. CTH with no acute finding or detrimental change when compared with 06/11/2024, allowing for motion and artifact limitations. The patient received 1g IV Rocephin. Nephrology consulted for management of ESRD and HD treatment.

## 2024-06-22 NOTE — ASSESSMENT & PLAN NOTE
Patient's FSGs are not controlled on current hypoglycemics due to hypoglycemia.   Last A1c reviewed-   Lab Results   Component Value Date    HGBA1C 8.0 (H) 04/24/2024     Most recent fingerstick glucose reviewed-   Recent Labs   Lab 06/21/24 2016 06/21/24  2329 06/22/24  0737 06/22/24  1157   POCTGLUCOSE 150* 134* 192* 199*       Current correctional scale  Low  Maintain anti-hyperglycemic dose as follows-   Antihyperglycemics (From admission, onward)    Start     Stop Route Frequency Ordered    06/22/24 0032  insulin aspart U-100 pen 0-5 Units         -- SubQ Before meals & nightly PRN 06/21/24 2332      -Pt initially hypoglycemic which improved with D10 bolus.   -Accuchecks AC/HS

## 2024-06-22 NOTE — ASSESSMENT & PLAN NOTE
Patient with Chronic debility due to other reduced mobility. Latest AMPAC and GEMS scores have not been reviewed. Plan includes progressive mobility protocol initated and fall precautions in place.  - family initiated NH paperwork  - CM notified

## 2024-06-22 NOTE — ASSESSMENT & PLAN NOTE
Patient's anemia is currently controlled. Has not received any PRBCs to date. Etiology likely d/t chronic disease due to ESRD  Current CBC reviewed-   Lab Results   Component Value Date    HGB 9.8 (L) 06/22/2024    HCT 30.9 (L) 06/22/2024     Monitor serial CBC and transfuse if patient becomes hemodynamically unstable, symptomatic or H/H drops below 7/21.

## 2024-06-22 NOTE — ASSESSMENT & PLAN NOTE
"Patient is identified as having Combined Systolic and Diastolic heart failure that is Chronic. CHF is currently controlled. Latest ECHO performed and demonstrates- Results for orders placed during the hospital encounter of 05/07/24    Echo    Interpretation Summary    Left Ventricle: The left ventricle is mildly dilated. Ventricular mass is normal. Normal wall thickness. Severe global hypokinesis present. There is severely reduced systolic function. Ejection fraction by visual approximation is 15%. There is diastolic dysfunction.    Right Ventricle: Severe right ventricular enlargement. Wall thickness is normal. Right ventricle wall motion has global hypokinesis. Systolic function is mildly reduced.    Left Atrium: Left atrium is severely dilated. There is an atrial septal aneurysm.    Right Atrium: Right atrium is dilated.    Aortic Valve: There is mild aortic regurgitation.    Mitral Valve: There is mild regurgitation.    Tricuspid Valve: There is annular dilation present. There is normal leaflet mobility. There is severe functional regurgitation.    Pulmonary Artery: The estimated pulmonary artery systolic pressure is at least 24 mmHg. PASP is likely under-estimated in the setting of severe tricuspid regurgitation.    IVC/SVC: Central venous pressure of at least 8 mmHg.    No vegetation seen.  . Continue Beta Blocker and monitor clinical status closely. Monitor on telemetry. Patient is off CHF pathway.  Monitor strict Is&Os and daily weights.  Place on fluid restriction of 1.5 L. Continue to stress to patient importance of self efficacy and  on diet for CHF. Last BNP reviewed- and noted below No results for input(s): "BNP", "BNPTRIAGEBLO" in the last 168 hours..  -Volume management with HD.    "

## 2024-06-22 NOTE — ASSESSMENT & PLAN NOTE
Body mass index is 49.92 kg/m². Morbid obesity complicates all aspects of disease management from diagnostic modalities to treatment.

## 2024-06-22 NOTE — ASSESSMENT & PLAN NOTE
Patient has acute metabolic encephalopathy that likely secondary to UTI.Treatment for underlying cause is underway. Pt with recurrent admissions for encephalopathy with extensive work ups including EEG, CTH, MRI brain, and vitamin panels. Will monitor neuro status carefully, avoid narcotics and benzos that will exacerbate agitation, and use PRN anti-psychotics for controls of behavior for self harm.  -Initially hypoglycemic which improved with treatment. Na 129.  -UA concerning for infection.  -CXR with diffuse increased interstitial and parenchymal attenuation similar to prior study. No large region of confluent airspace consolidation or substantial volume of pleural fluid identified.   -CTH-negative  -Delirium precautions

## 2024-06-22 NOTE — ASSESSMENT & PLAN NOTE
"Patient is identified as having Combined Systolic and Diastolic heart failure that is Chronic. CHF is currently controlled. Latest ECHO performed and demonstrates- Results for orders placed during the hospital encounter of 05/07/24    Echo    Interpretation Summary    Left Ventricle: The left ventricle is mildly dilated. Ventricular mass is normal. Normal wall thickness. Severe global hypokinesis present. There is severely reduced systolic function. Ejection fraction by visual approximation is 15%. There is diastolic dysfunction.    Right Ventricle: Severe right ventricular enlargement. Wall thickness is normal. Right ventricle wall motion has global hypokinesis. Systolic function is mildly reduced.    Left Atrium: Left atrium is severely dilated. There is an atrial septal aneurysm.    Right Atrium: Right atrium is dilated.    Aortic Valve: There is mild aortic regurgitation.    Mitral Valve: There is mild regurgitation.    Tricuspid Valve: There is annular dilation present. There is normal leaflet mobility. There is severe functional regurgitation.    Pulmonary Artery: The estimated pulmonary artery systolic pressure is at least 24 mmHg. PASP is likely under-estimated in the setting of severe tricuspid regurgitation.    IVC/SVC: Central venous pressure of at least 8 mmHg.    No vegetation seen.  . Continue Beta Blocker and monitor clinical status closely. Monitor on telemetry. Patient is off CHF pathway.  Monitor strict Is&Os and daily weights.  Place on fluid restriction of 1.5 L. Continue to stress to patient importance of self efficacy and  on diet for CHF. Last BNP reviewed- and noted below No results for input(s): "BNP", "BNPTRIAGEBLO" in the last 168 hours..  -Volume management with HD.    "

## 2024-06-22 NOTE — ASSESSMENT & PLAN NOTE
Patient has acute metabolic encephalopathy that likely secondary to UTI.Treatment for underlying cause is underway. Pt with recurrent admissions for encephalopathy with extensive work ups including EEG, CTH, MRI brain, and vitamin panels. Will monitor neuro status carefully, avoid narcotics and benzos that will exacerbate agitation, and use PRN anti-psychotics for controls of behavior for self harm.  -Initially hypoglycemic which improved with treatment. Na 129.  -UA concerning for infection.  -CXR with diffuse increased interstitial and parenchymal attenuation similar to prior study. No large region of confluent airspace consolidation or substantial volume of pleural fluid identified.   -CTH-negative  -Delirium precautions   - reports progressive decline in patient over the last few months. Initiated NH paperwork for patient. CM notified for assistance

## 2024-06-22 NOTE — SUBJECTIVE & OBJECTIVE
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    Osteomyelitis     Osteomyelitis of left foot 04/30/2021    Ulcer of left foot     Vaginal delivery     x1       Past Surgical History:   Procedure Laterality Date    ABOVE-KNEE AMPUTATION Left 5/18/2021    Procedure: AMPUTATION, ABOVE KNEE;  Surgeon: Teddy Huber MD;  Location: [facility] OR [address] [place] [address];  Service: Vascular;  Laterality: Left;    ABOVE-KNEE AMPUTATION Right 3/18/2022    Procedure: AMPUTATION, ABOVE KNEE;  Surgeon: DAYNE Florez II, MD;  Location: [facility] OR [address] [place] [address];  Service: Vascular;  Laterality: Right;    Angiogram - Right Extremity Right 7/9/15    angiogram-left leg  10/6/15    ANGIOGRAPHY OF LOWER EXTREMITY Left 4/29/2021    Procedure: ANGIOGRAM, LOWER EXTREMITY;  Surgeon: Teddy Huber MD;  Location: [facility] OR [address] [place] [address];  Service: Vascular;  Laterality: Left;    BELOW KNEE AMPUTATION OF LOWER EXTREMITY Right 12/28/2021    Procedure: AMPUTATION, BELOW KNEE;  Surgeon: Kaitlyn Rojas MD;  Location: [facility] OR;  Service: General;  Laterality: Right;    CATHETERIZATION OF BOTH LEFT AND RIGHT HEART N/A 12/18/2019    Procedure: CATHETERIZATION, HEART, BOTH LEFT AND RIGHT;  Surgeon: Que Fernando III, MD;  Location: [facility] CATH LAB;  Service: Cardiology;  Laterality: N/A;    CORONARY ANGIOGRAPHY N/A 12/18/2019    Procedure: ANGIOGRAM, CORONARY ARTERY;  Surgeon: Que Fernando III, MD;  Location: [facility] CATH LAB;  Service: Cardiology;  Laterality: N/A;    CORONARY ANGIOGRAPHY INCLUDING BYPASS GRAFTS WITH CATHETERIZATION OF LEFT HEART N/A 7/28/2020    Procedure: ANGIOGRAM, CORONARY, INCLUDING BYPASS GRAFT, WITH LEFT HEART CATHETERIZATION, 9 am;  Surgeon: Rachel Easley MD;   Location: [facility] CATH LAB;  Service: Cardiology;  Laterality: N/A;    CORONARY ARTERY BYPASS GRAFT (CABG) N/A 1/14/2020    Procedure: CORONARY ARTERY BYPASS GRAFT (CABG) x 1     Off Pump;  Surgeon: Huang Altamirano MD;  Location: [facility] OR [address] [place] [address];  Service: Cardiovascular;  Laterality: N/A;    CREATION OF FEMORAL-TIBIAL ARTERY BYPASS Left 4/29/2021    Procedure: CREATION, BYPASS, ARTERIAL, FEMORAL TO ANTERIOR TIBIAL;  Surgeon: Teddy Huber MD;  Location: [facility] OR [address] [place] [address];  Service: Vascular;  Laterality: Left;    CREATION OF FEMOROPOPLITEAL ARTERIAL BYPASS USING GRAFT Left 8/18/2020    Procedure: CREATION, BYPASS, ARTERIAL, FEMORAL TO POPLITEAL, USING GRAFT, LEFT LOWER EXTREMITY;  Surgeon: Teddy Huber MD;  Location: [facility];  Service: Vascular;  Laterality: Left;  REQUEST 7:15 A.M. START----COVID NEGATIVE ON 8/17 1ST CASE STARTE PER LEANA ON 8/7/2020 @ 942AM-  RN PREOP 8/12/2020   T/S-----CLEARED BY CARDS-------PENDING INSURANCE    DEBRIDEMENT OF FOOT Left 9/8/2020    Procedure: DEBRIDEMENT, FOOT;  Surgeon: Rosio Mayes DPM;  Location: [facility];  Service: Podiatry;  Laterality: Left;  request neoxx .   RN Pre Op 9-4-2020, Covid negative on 9/5/20. C A    DEBRIDEMENT OF FOOT  3/4/2021    Procedure: DEBRIDEMENT, FOOT;  Surgeon: Teddy Huber MD;  Location: [facility] OR;  Service: Vascular;;    DEBRIDEMENT OF FOOT Left 3/9/2021    Procedure: DEBRIDEMENT, FOOT, bone biopsy;  Surgeon: Rosio Mayes DPM;  Location: [facility] OR;  Service: Podiatry;  Laterality: Left;  Request neoxx---COVID IN AM  REQUESTING NOON START  RN Phone Pre op.On Blood thinners Plavix and Eliquis.  Covid am of surgery. C A    DEBRIDEMENT OF FOOT Left 5/4/2021    Procedure: DEBRIDEMENT, FOOT;  Surgeon: Farooq Morley DPM;  Location: [address];  Service: Podiatry;  Laterality: Left;    INSERTION OF TUNNELED CENTRAL VENOUS HEMODIALYSIS CATHETER N/A 1/27/2020    Procedure: Insertion, Catheter, Central Venous,  Hemodialysis;  Surgeon: ESTEBAN Gomez III, MD;  Location: [facility] CATH LAB;  Service: Peripheral Vascular;  Laterality: N/A;    INSERTION OF TUNNELED CENTRAL VENOUS HEMODIALYSIS CATHETER  5/10/2023    Procedure: Insertion, Catheter, Central Venous, Hemodialysis;  Surgeon: Romulo Queen MD;  Location: [facility] CATH LAB;  Service: Cardiology;;    PERCUTANEOUS TRANSLUMINAL ANGIOPLASTY N/A 3/4/2021    Procedure: PTA (ANGIOPLASTY, PERCUTANEOUS, TRANSLUMINAL);  Surgeon: Teddy Huber MD;  Location: [facility] OR;  Service: Vascular;  Laterality: N/A;    REMOVAL OF ARTERIOVENOUS GRAFT Left 5/27/2021    Procedure: REMOVAL, GRAFT, LEFT LOWER EXTREMITY, WOUND EXPLORATION;  Surgeon: Teddy Huber MD;  Location: [facility] OR 2ND FLR;  Service: Vascular;  Laterality: Left;    REMOVAL OF NAIL OF DIGIT Left 3/9/2021    Procedure: REMOVAL, NAIL, DIGIT;  Surgeon: Rosio Mayes DPM;  Location: [facility] OR;  Service: Podiatry;  Laterality: Left;    RIGHT HEART CATHETERIZATION Right 5/10/2023    Procedure: INSERTION, CATHETER, RIGHT HEART;  Surgeon: Romulo Queen MD;  Location: [facility] CATH LAB;  Service: Cardiology;  Laterality: Right;    THROMBECTOMY Left 3/4/2021    Procedure: THROMBECTOMY, LEFT LOWER EXTREMITY BYPASS GRAFT, ANGIOGRAM, POSSIBLE INTERVENTION, POSSIBLE LEFT LOWER EXTREMITY BYPASS;  Surgeon: Teddy Huber MD;  Location: [facility] OR;  Service: Vascular;  Laterality: Left;    THROMBECTOMY Left 4/29/2021    Procedure: GRAFT THROMBECTOMY, LEFT LOWER EXTREMITY;  Surgeon: Teddy Huber MD;  Location: [facility] OR 2ND FLR;  Service: Vascular;  Laterality: Left;  14.5 min  1179.85 mGy  341.01 Gycm2  240 ml dye    THROMBECTOMY  10/22/2021    Procedure: THROMBECTOMY;  Surgeon: Saad Arenas MD;  Location: [facility] CATH LAB/EP;  Service: Cardiology;;       Review of patient's allergies indicates:   Allergen Reactions    Ciprofloxacin Itching    Iodine      Kidney injury    Pcn [penicillins]      Rash; tolerated ceftriaxone  on 1/13/20     Current Facility-Administered Medications   Medication Frequency    [START ON 6/24/2024] 0.9%  NaCl infusion Once    acetaminophen tablet 1,000 mg Q8H PRN    acetaminophen tablet 650 mg Q6H PRN    allopurinol split tablet 50 mg Every other day    apixaban tablet 5 mg BID    atorvastatin tablet 80 mg Daily    calcitRIOL capsule 0.5 mcg Daily    calcium acetate(phosphat bind) capsule 667 mg TID WM    carvediloL tablet 6.25 mg BID    [START ON 6/23/2024] cefTRIAXone (Rocephin) 1 g in D5W 100 mL IVPB (MB+) Q24H    clopidogreL tablet 75 mg Daily    dextrose 10% bolus 125 mL 125 mL PRN    dextrose 10% bolus 250 mL 250 mL PRN    famotidine tablet 20 mg Daily    glucagon (human recombinant) injection 1 mg PRN    glucose chewable tablet 16 g PRN    glucose chewable tablet 24 g PRN    hydrALAZINE tablet 50 mg TID    insulin aspart U-100 pen 0-5 Units QID (AC + HS) PRN    melatonin tablet 6 mg Nightly PRN    naloxone 0.4 mg/mL injection 0.02 mg PRN    prochlorperazine injection Soln 5 mg Q6H PRN    sodium bicarbonate tablet 650 mg TID    sodium chloride 0.9% flush 10 mL Q12H PRN     Family History       Problem Relation (Age of Onset)    Diabetes Mother, Father, Paternal Grandmother    Heart disease Maternal Grandmother    No Known Problems Maternal Grandfather, Paternal Grandfather          Tobacco Use    Smoking status: Former    Smokeless tobacco: Never   Substance and Sexual Activity    Alcohol use: No    Drug use: Yes     Types: Marijuana     Comment: occassional    Sexual activity: Yes     Partners: Male     Review of Systems   Unable to perform ROS: Mental status change   Eyes:  Negative for visual disturbance.   Respiratory:  Negative for shortness of breath.    Cardiovascular:  Negative for chest pain.   Gastrointestinal:  Negative for abdominal pain and nausea.   Genitourinary:  Negative for dysuria and frequency.     Objective:     Vital Signs (Most Recent):  Temp: 98.4 °F (36.9 °C) (06/22/24  1141)  Pulse: 80 (06/22/24 1141)  Resp: 18 (06/22/24 1141)  BP: (!) 121/56 (06/22/24 1141)  SpO2: 97 % (06/22/24 1141) Vital Signs (24h Range):  Temp:  [97.4 °F (36.3 °C)-98.4 °F (36.9 °C)] 98.4 °F (36.9 °C)  Pulse:  [72-87] 80  Resp:  [11-20] 18  SpO2:  [92 %-97 %] 97 %  BP: (114-171)/(56-99) 121/56     Weight: 74.2 kg (163 lb 9.3 oz) (06/22/24 0400)  Body mass index is 49.92 kg/m².  Body surface area is 1.59 meters squared.    I/O last 3 completed shifts:  In: 125 [IV Piggyback:125]  Out: -      Physical Exam  Vitals and nursing note reviewed.   Constitutional:       Appearance: She is not toxic-appearing or diaphoretic.      Comments: Chronically ill appearing   HENT:      Head: Normocephalic and atraumatic.      Nose: Nose normal.      Mouth/Throat:      Mouth: Mucous membranes are moist.   Eyes:      General: No scleral icterus.  Cardiovascular:      Rate and Rhythm: Normal rate and regular rhythm.      Pulses: Normal pulses.   Pulmonary:      Effort: Pulmonary effort is normal. No respiratory distress.      Breath sounds: No wheezing, rhonchi or rales.   Abdominal:      General: Bowel sounds are normal. There is no distension.      Palpations: Abdomen is soft.      Tenderness: There is abdominal tenderness (RLQ/suprapubic). There is no guarding.   Musculoskeletal:         General: Normal range of motion.      Cervical back: Normal range of motion.      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      Left Lower Extremity: Left leg is amputated above knee.   Skin:     General: Skin is warm and dry.      Capillary Refill: Capillary refill takes less than 2 seconds.   Neurological:      General: No focal deficit present.      Mental Status: She is alert.      Cranial Nerves: No facial asymmetry.      Motor: No tremor or seizure activity.      Comments: AAOx3. Will nod yes or no, occasionally answer questions, and follows basic commands.   Psychiatric:         Mood and Affect: Affect is tearful.          Speech: She is noncommunicative.         Behavior: Behavior is cooperative.          Significant Labs:  CBC:   Recent Labs   Lab 06/22/24  0649   WBC 3.11*   RBC 3.01*   HGB 9.8*   HCT 30.9*   *   *   MCH 32.6*   MCHC 31.7*     CMP:   Recent Labs   Lab 06/22/24  0649   *   CALCIUM 8.4*   ALBUMIN 2.6*   PROT 7.2   *   K 4.2   CO2 24   CL 94*   BUN 28*   CREATININE 3.5*   ALKPHOS 119   ALT 11   AST 20   BILITOT 0.7     All labs within the past 24 hours have been reviewed.

## 2024-06-22 NOTE — ASSESSMENT & PLAN NOTE
Patient with Paroxysmal (<7 days) atrial fibrillation which is controlled currently with Beta Blocker. Patient is currently in sinus rhythm.[id number] Score: 3. Anticoagulation indicated. Anticoagulation done with eliquis .

## 2024-06-22 NOTE — ASSESSMENT & PLAN NOTE
Chronic kidney disease-mineral and bone disorder   MWF schedule, last dialyzed 6/21  Residual renal function?- Yes    - Nephrology consulted  - Continue chronic hemodialysis  - Monitor daily electrolytes and defer dialysis orders to nephrology  - Renally dose medications  - Renal diet  - Continue phosphorus binders  - Daily weights/strict I&Os  - 1.5L FR

## 2024-06-22 NOTE — ASSESSMENT & PLAN NOTE
Patient with Chronic debility due to other reduced mobility. Latest AMPAC and GEMS scores have not been reviewed. Plan includes progressive mobility protocol initated and fall precautions in place.

## 2024-06-22 NOTE — ASSESSMENT & PLAN NOTE
Patient's anemia is currently controlled. Has not received any PRBCs to date. Etiology likely d/t chronic disease due to ESRD  Current CBC reviewed-   Lab Results   Component Value Date    HGB 11.0 (L) 06/21/2024    HCT 34.7 (L) 06/21/2024     Monitor serial CBC and transfuse if patient becomes hemodynamically unstable, symptomatic or H/H drops below 7/21.

## 2024-06-22 NOTE — NURSING
Nurses Note -- 4 Eyes      6/22/2024   2:27 AM      Skin assessed during: Admit      [] No Altered Skin Integrity Present    []Prevention Measures Documented      [x] Yes- Altered Skin Integrity Present or Discovered   [x] LDA Added if Not in Epic (Describe Wound)   [x] New Altered Skin Integrity was Present on Admit and Documented in LDA   [x] Wound Image Taken    Wound Care Consulted? Yes    Attending Nurse: Gwendolyn Lin RN/Staff Member:   Genny

## 2024-06-22 NOTE — PLAN OF CARE
Problem: Infection  Goal: Absence of Infection Signs and Symptoms  Outcome: Progressing     Problem: Adult Inpatient Plan of Care  Goal: Plan of Care Review  Outcome: Progressing  Goal: Patient-Specific Goal (Individualized)  Outcome: Progressing  Goal: Absence of Hospital-Acquired Illness or Injury  Outcome: Progressing  Goal: Optimal Comfort and Wellbeing  Outcome: Progressing  Goal: Readiness for Transition of Care  Outcome: Progressing     Problem: Bariatric Environmental Safety  Goal: Safety Maintained with Care  Outcome: Progressing     Problem: Skin Injury Risk Increased  Goal: Skin Health and Integrity  Outcome: Progressing     Problem: Diabetes Comorbidity  Goal: Blood Glucose Level Within Targeted Range  Outcome: Progressing     Problem: Sepsis/Septic Shock  Goal: Optimal Coping  Outcome: Progressing  Goal: Absence of Bleeding  Outcome: Progressing  Goal: Blood Glucose Level Within Targeted Range  Outcome: Progressing  Goal: Absence of Infection Signs and Symptoms  Outcome: Progressing  Goal: Optimal Nutrition Intake  Outcome: Progressing     Problem: Wound  Goal: Optimal Coping  Outcome: Progressing  Goal: Optimal Functional Ability  Outcome: Progressing  Goal: Absence of Infection Signs and Symptoms  Outcome: Progressing  Goal: Improved Oral Intake  Outcome: Progressing  Goal: Optimal Pain Control and Function  Outcome: Progressing  Goal: Skin Health and Integrity  Outcome: Progressing  Goal: Optimal Wound Healing  Outcome: Progressing     Problem: Fall Injury Risk  Goal: Absence of Fall and Fall-Related Injury  Outcome: Progressing     Problem: Pain Acute  Goal: Optimal Pain Control and Function  Outcome: Progressing     Problem: Hemodialysis  Goal: Safe, Effective Therapy Delivery  Outcome: Progressing  Goal: Effective Tissue Perfusion  Outcome: Progressing  Goal: Absence of Infection Signs and Symptoms  Outcome: Progressing

## 2024-06-22 NOTE — ASSESSMENT & PLAN NOTE
-Pt denies any abd pain, dysuria, urinary frequency, or fever/chills. Per  patient's urine has been foul smelling and cloudy. Per  patient has also been complaining of suprapubic pain and dysuria at home.  -Afebrile, WBC 3.80 (chronic leukopenia).  -UA reviewed, follow urine cx.  -Pt received IV rocephin in the ED, continue for now.

## 2024-06-22 NOTE — ASSESSMENT & PLAN NOTE
-Chronic, uncontrolled.  -Hold zoloft for now pending hyponatremia work up.   -Can consider psychiatry consult.

## 2024-06-22 NOTE — H&P
Andrew Andrade - Emergency Dept  [facility] Medicine  History & Physical    Patient Name: Suyapa Connelly  MRN: [id number]  Patient Class: OP- Observation  Admission Date: 6/21/2024  Attending Physician: Frances Bee MD   Primary Care Provider: Feliciano Nguyen III, PA-C         Patient information was obtained from patient, past medical records, and ER records.     Subjective:     Principal Problem:Acute encephalopathy    Chief Complaint:   Chief Complaint   Patient presents with    Female  Problem     Has pure wick at home,  reports urine smelling and cloudy, max aka        HPI: Suyapa Connelly is a 58 y.o. female with a PMHx of HTN, GERD, HLD, PVD, ESRD on HD MFW, b/l AKAs, CAD s/p CABG, afib on eliquis, depression, and anxiety who presents to the ED for evaluation of confusion. HPI and ROS limited secondary to patient's mental status and poor participation. Per patient's  she has been more sleepy and less interactive over the past several days. He reports that her urine has been foul-smelling and more cloudy. He states she was also peeing more frequently in complaining of burning with urination and some occasional suprapubic pain. Denies any fevers or chills. The patient denies CP, SOB, abdominal pain, or dysuria. Per  patient was dialyzed today a full dialysis and they came straight here after.     In the ED, VSS, afebrile. CBC with stable leukopenia and anemia. Na 129, Chloride 94, Bicarb 22, Glucose 50. POC glucose 150. Cr 2.7 (consistent with ESRD). Albumin 3.2. Tbili 1.1. Mag 2.1. UA with 3+ leukocytosis, 74 WBCs, but no bacteria. CTH with no acute finding or detrimental change when compared with 06/11/2024, allowing for motion and artifact limitations. The patient received 1g IV Rocephin.    Past Medical History:   Diagnosis Date    Anxiety     Chronic pain syndrome     CKD (chronic kidney disease), stage III     Depression     Diabetes mellitus, type 2     GERD (gastroesophageal reflux  disease)     Hyperemesis 03/23/2021    Hypokalemia 03/23/2021    Infection of below knee amputation stump 03/12/2022    Osteomyelitis     Osteomyelitis of left foot 04/30/2021    Ulcer of left foot     Vaginal delivery     x1       Past Surgical History:   Procedure Laterality Date    ABOVE-KNEE AMPUTATION Left 5/18/2021    Procedure: AMPUTATION, ABOVE KNEE;  Surgeon: Teddy Huber MD;  Location: [facility] OR [address] [place] [address];  Service: Vascular;  Laterality: Left;    ABOVE-KNEE AMPUTATION Right 3/18/2022    Procedure: AMPUTATION, ABOVE KNEE;  Surgeon: DAYNE Florez II, MD;  [facility];  Service: Vascular;  Laterality: Right;    Angiogram - Right Extremity Right 7/9/15    angiogram-left leg  10/6/15    ANGIOGRAPHY OF LOWER EXTREMITY Left 4/29/2021    Procedure: ANGIOGRAM, LOWER EXTREMITY;  Surgeon: Teddy Huber MD;  Location: [facility] OR [address] [place] [address];  Service: Vascular;  Laterality: Left;    BELOW KNEE AMPUTATION OF LOWER EXTREMITY Right 12/28/2021    Procedure: AMPUTATION, BELOW KNEE;  Surgeon: Kaitlyn Rojas MD;  Location: [facility] OR;  Service: General;  Laterality: Right;    CATHETERIZATION OF BOTH LEFT AND RIGHT HEART N/A 12/18/2019    Procedure: CATHETERIZATION, HEART, BOTH LEFT AND RIGHT;  Surgeon: Que Fernando III, MD;  Location: [facility] CATH LAB;  Service: Cardiology;  Laterality: N/A;    CORONARY ANGIOGRAPHY N/A 12/18/2019    Procedure: ANGIOGRAM, CORONARY ARTERY;  Surgeon: Que Fernando III, MD;  Location: [facility] CATH LAB;  Service: Cardiology;  Laterality: N/A;    CORONARY ANGIOGRAPHY INCLUDING BYPASS GRAFTS WITH CATHETERIZATION OF LEFT HEART N/A 7/28/2020    Procedure: ANGIOGRAM, CORONARY, INCLUDING BYPASS GRAFT, WITH LEFT HEART CATHETERIZATION, 9 am;  Surgeon: Rachel Easley MD;  Location: [facility] CATH LAB;  Service: Cardiology;  Laterality: N/A;    CORONARY ARTERY BYPASS GRAFT (CABG) N/A 1/14/2020    Procedure: CORONARY ARTERY BYPASS GRAFT (CABG) x 1     Off Pump;  Surgeon: Huang  RAUL Altamirano MD;  Location: [address];  Service: Cardiovascular;  Laterality: N/A;    CREATION OF FEMORAL-TIBIAL ARTERY BYPASS Left 4/29/2021    Procedure: CREATION, BYPASS, ARTERIAL, FEMORAL TO ANTERIOR TIBIAL;  Surgeon: Teddy Huber MD;  [address];  Service: Vascular;  Laterality: Left;    CREATION OF FEMOROPOPLITEAL ARTERIAL BYPASS USING GRAFT Left 8/18/2020    Procedure: CREATION, BYPASS, ARTERIAL, FEMORAL TO POPLITEAL, USING GRAFT, LEFT LOWER EXTREMITY;  Surgeon: Teddy Huber MD;  Location: [facility];  Service: Vascular;  Laterality: Left;  REQUEST 7:15 A.M. START----COVID NEGATIVE ON 8/17 1ST CASE STARTE PER LEANA ON 8/7/2020 @ 942AM-[place]  RN PREOP 8/12/2020   T/S-----CLEARED BY CARDS-------PENDING INSURANCE    DEBRIDEMENT OF FOOT Left 9/8/2020    Procedure: DEBRIDEMENT, FOOT;  Surgeon: Rosio Mayes DPM;  Location: [facility];  Service: Podiatry;  Laterality: Left;  request neoxx .   RN Pre Op 9-4-2020, Covid negative on 9/5/20. C A    DEBRIDEMENT OF FOOT  3/4/2021    Procedure: DEBRIDEMENT, FOOT;  Surgeon: Teddy Huber MD;  Location: [facility];  Service: Vascular;;    DEBRIDEMENT OF FOOT Left 3/9/2021    Procedure: DEBRIDEMENT, FOOT, bone biopsy;  Surgeon: Rosio Mayes DPM;  Location: [facility] OR;  Service: Podiatry;  Laterality: Left;  Request neoxx---COVID IN AM  REQUESTING NOON START  RN Phone Pre op.On Blood thinners Plavix and Eliquis.  Covid am of surgery. C A    DEBRIDEMENT OF FOOT Left 5/4/2021    Procedure: DEBRIDEMENT, FOOT;  Surgeon: Farooq Morley DPM;  Location: [address];  Service: Podiatry;  Laterality: Left;    INSERTION OF TUNNELED CENTRAL VENOUS HEMODIALYSIS CATHETER N/A 1/27/2020    Procedure: Insertion, Catheter, Central Venous, Hemodialysis;  Surgeon: ESTEBAN Gomez III, MD;  Location: [facility] CATH LAB;  Service: Peripheral Vascular;  Laterality: N/A;    INSERTION OF TUNNELED CENTRAL VENOUS HEMODIALYSIS CATHETER  5/10/2023    Procedure:  Insertion, Catheter, Central Venous, Hemodialysis;  Surgeon: Romulo Queen MD;  Location: [facility] CATH LAB;  Service: Cardiology;;    PERCUTANEOUS TRANSLUMINAL ANGIOPLASTY N/A 3/4/2021    Procedure: PTA (ANGIOPLASTY, PERCUTANEOUS, TRANSLUMINAL);  Surgeon: Teddy Huber MD;  Location: [facility] OR;  Service: Vascular;  Laterality: N/A;    REMOVAL OF ARTERIOVENOUS GRAFT Left 5/27/2021    Procedure: REMOVAL, GRAFT, LEFT LOWER EXTREMITY, WOUND EXPLORATION;  Surgeon: Teddy Huber MD;  Location: [facility] OR 2ND FLR;  Service: Vascular;  Laterality: Left;    REMOVAL OF NAIL OF DIGIT Left 3/9/2021    Procedure: REMOVAL, NAIL, DIGIT;  Surgeon: Rosio Mayes DPM;  Location: [facility] OR;  Service: Podiatry;  Laterality: Left;    RIGHT HEART CATHETERIZATION Right 5/10/2023    Procedure: INSERTION, CATHETER, RIGHT HEART;  Surgeon: Romulo Queen MD;  Location: [facility] CATH LAB;  Service: Cardiology;  Laterality: Right;    THROMBECTOMY Left 3/4/2021    Procedure: THROMBECTOMY, LEFT LOWER EXTREMITY BYPASS GRAFT, ANGIOGRAM, POSSIBLE INTERVENTION, POSSIBLE LEFT LOWER EXTREMITY BYPASS;  Surgeon: Teddy Huber MD;  Location: [facility] OR;  Service: Vascular;  Laterality: Left;    THROMBECTOMY Left 4/29/2021    Procedure: GRAFT THROMBECTOMY, LEFT LOWER EXTREMITY;  Surgeon: Teddy Huber MD;  Location: [facility] 2ND FLR;  Service: Vascular;  Laterality: Left;  14.5 min  1179.85 mGy  341.01 Gycm2  240 ml dye    THROMBECTOMY  10/22/2021    Procedure: THROMBECTOMY;  Surgeon: Saad Arenas MD;  Location: [facility] CATH LAB/EP;  Service: Cardiology;;       Review of patient's allergies indicates:   Allergen Reactions    Ciprofloxacin Itching    Iodine      Kidney injury    Pcn [penicillins]      Rash; tolerated ceftriaxone on 1/13/20       No current facility-administered medications on file prior to encounter.     Current Outpatient Medications on File Prior to Encounter   Medication Sig    acetaminophen (TYLENOL) 500 MG tablet  "Take 500 mg by mouth every 6 (six) hours as needed for Pain.    allopurinoL (ZYLOPRIM) 100 MG tablet Take 0.5 tablets (50 mg total) by mouth every other day.    apixaban (ELIQUIS) 5 mg Tab Take 1 tablet (5 mg total) by mouth 2 (two) times daily.    atorvastatin (LIPITOR) 80 MG tablet Take 1 tablet (80 mg total) by mouth once daily.    blood sugar diagnostic Strp Use to check blood glucose 2 (two) times a day.    calcitRIOL (ROCALTROL) 0.5 MCG Cap Take 1 capsule (0.5 mcg total) by mouth once daily.    calcium acetate,phosphat bind, (PHOSLO) 667 mg capsule Take 1 capsule (667 mg total) by mouth 3 (three) times daily with meals.    carvediloL (COREG) 6.25 MG tablet Take 1 tablet (6.25 mg total) by mouth 2 (two) times daily.    clopidogreL (PLAVIX) 75 mg tablet Take 1 tablet (75 mg total) by mouth once daily.    famotidine (PEPCID) 20 MG tablet Take 1 tablet (20 mg total) by mouth 2 (two) times daily.    hydrALAZINE (APRESOLINE) 50 MG tablet Take 1 tablet (50 mg total) by mouth 3 (three) times daily.    insulin aspart, niacinamide, (FIASP FLEXTOUCH U-100 INSULIN) 100 unit/mL (3 mL) InPn Inject 3 Units into the skin 3 (three) times daily with meals.    lancets Mis To check BG 3 times daily, to use with insurance preferred meter    multivitamin (ONE DAILY MULTIVITAMIN) per tablet Take 1 tablet by mouth once daily.    pen needle, diabetic 32 gauge x 5/32" Ndle 1 Units by Misc.(Non-Drug; Combo Route) route before meals as needed (SSI).    sertraline (ZOLOFT) 100 MG tablet Take 1 tablet (100 mg total) by mouth once daily.    sodium bicarbonate 650 MG tablet TAKE 1 TABLET(650 MG) BY MOUTH THREE TIMES DAILY    traZODone (DESYREL) 50 MG tablet Take 0.5 tablets (25 mg total) by mouth every evening.    wound dressings (TRIAD WOUND DRESSING) Pste Apply 2 g topically once daily.    [DISCONTINUED] lancing device Misc 1 Device by Misc.(Non-Drug; Combo Route) route 2 (two) times daily with meals.     Family History       Problem " Relation (Age of Onset)    Diabetes Mother, Father, Paternal Grandmother    Heart disease Maternal Grandmother    No Known Problems Maternal Grandfather, Paternal Grandfather          Tobacco Use    Smoking status: Former    Smokeless tobacco: Never   Substance and Sexual Activity    Alcohol use: No    Drug use: Yes     Types: Marijuana     Comment: occassional    Sexual activity: Yes     Partners: Male     Review of Systems   Unable to perform ROS: Mental status change   Respiratory:  Negative for shortness of breath.    Cardiovascular:  Negative for chest pain.   Gastrointestinal:  Negative for abdominal pain and nausea.   Genitourinary:  Negative for dysuria and frequency.     Objective:     Vital Signs (Most Recent):  Temp: 98.2 °F (36.8 °C) (06/21/24 1550)  Pulse: 79 (06/21/24 2202)  Resp: 14 (06/21/24 2202)  BP: (!) 166/67 (06/21/24 2202)  SpO2: 96 % (06/21/24 2202) Vital Signs (24h Range):  Temp:  [98.2 °F (36.8 °C)] 98.2 °F (36.8 °C)  Pulse:  [79-83] 79  Resp:  [11-20] 14  SpO2:  [96 %-97 %] 96 %  BP: (131-166)/(65-77) 166/67     Weight: 74.4 kg (164 lb)  Body mass index is 50.05 kg/m².     Physical Exam  Vitals and nursing note reviewed.   Constitutional:       Appearance: She is not toxic-appearing or diaphoretic.      Comments: Chronically ill appearing   HENT:      Head: Normocephalic and atraumatic.      Nose: Nose normal.      Mouth/Throat:      Mouth: Mucous membranes are moist.   Eyes:      Pupils: Pupils are equal, round, and reactive to light.   Cardiovascular:      Rate and Rhythm: Normal rate and regular rhythm.      Pulses: Normal pulses.   Pulmonary:      Effort: Pulmonary effort is normal. No respiratory distress.      Breath sounds: No wheezing, rhonchi or rales.   Abdominal:      General: Bowel sounds are normal. There is no distension.      Palpations: Abdomen is soft.      Tenderness: There is no abdominal tenderness. There is no guarding.   Musculoskeletal:         General: Normal range  of motion.      Cervical back: Normal range of motion.      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      Left Lower Extremity: Left leg is amputated above knee.   Skin:     General: Skin is warm and dry.      Capillary Refill: Capillary refill takes less than 2 seconds.   Neurological:      General: No focal deficit present.      Mental Status: She is alert.      Cranial Nerves: No facial asymmetry.      Motor: No tremor or seizure activity.      Comments: Patient alert. Currently non verbal. Will nod yes or no and follow basic commands. Able to move all 4 extremities. RJ neurological status further due to patient's poor participation.   Psychiatric:         Mood and Affect: Affect is tearful.         Speech: She is noncommunicative.         Behavior: Behavior is withdrawn. Behavior is cooperative.              CRANIAL NERVES     CN III, IV, VI   Pupils are equal, round, and reactive to light.             Significant Labs: All pertinent labs within the past 24 hours have been reviewed.  CBC:   Recent Labs   Lab 06/21/24  1710   WBC 3.80*   HGB 11.0*   HCT 34.7*        CMP:   Recent Labs   Lab 06/21/24  1710   *   K 3.7   CL 94*   CO2 22*   GLU 50*   BUN 21*   CREATININE 2.7*   CALCIUM 8.9   PROT 8.4   ALBUMIN 3.2*   BILITOT 1.1*   ALKPHOS 133   AST 23   ALT 11   ANIONGAP 13     POCT Glucose:   Recent Labs   Lab 06/21/24 2016   POCTGLUCOSE 150*     Urine Studies:   Recent Labs   Lab 06/21/24 2027   COLORU Yellow  Yellow   APPEARANCEUA Clear  Clear   PHUR 6.0  6.0   SPECGRAV 1.020  1.020   PROTEINUA 3+*  3+*   GLUCUA Negative  Negative   KETONESU Negative  Negative   BILIRUBINUA Negative  Negative   OCCULTUA 3+*  3+*   NITRITE Negative  Negative   LEUKOCYTESUR 3+*  3+*   RBCUA 14*   WBCUA 74*   BACTERIA None   SQUAMEPITHEL 1   HYALINECASTS 0       Significant Imaging: I have reviewed all pertinent imaging results/findings within the past 24 hours.  Imaging Results               CT Head Without Contrast (Final result)  Result time 06/21/24 21:23:20      Final result by Lino Peter MD (06/21/24 21:23:20)                   Impression:      No acute finding or detrimental change when compared with 06/11/2024, allowing for motion and artifact limitations.    Other findings discussed in the body of the report.      Electronically signed by: Lino Peter MD  Date:    06/21/2024  Time:    21:23               Narrative:    EXAMINATION:  CT HEAD WITHOUT CONTRAST    CLINICAL HISTORY:  Mental status change, unknown cause;    TECHNIQUE:  Low dose axial images were obtained through the head.  Coronal and sagittal reformations were also performed. Contrast was not administered.    COMPARISON:  06/11/2024.    FINDINGS:  Exam quality is limited by motion and streak artifact.    Generalized cerebral volume loss and chronic ischemic changes as seen previously.    No evidence of acute territorial infarct, hemorrhage, mass effect, or midline shift.    Ventricles are prominent and stable when compared with the prior study.  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    No displaced calvarial fracture.    Visualized paranasal sinuses and mastoid air cells are essentially clear.                                      Assessment/Plan:     * Acute encephalopathy  Patient has acute metabolic encephalopathy that likely secondary to UTI.Treatment for underlying cause is underway. Pt with recurrent admissions for encephalopathy with extensive work ups including EEG, CTH, MRI brain, and vitamin panels. Will monitor neuro status carefully, avoid narcotics and benzos that will exacerbate agitation, and use PRN anti-psychotics for controls of behavior for self harm.  -Initially hypoglycemic which improved with treatment. Na 129.  -UA concerning for infection.  -CXR with diffuse increased interstitial and parenchymal attenuation similar  to prior study. No large region of confluent airspace consolidation or substantial volume of pleural fluid identified.   -CTH-negative  -Delirium precautions     Acute cystitis without hematuria  -Pt denies any abd pain, dysuria, urinary frequency, or fever/chills. Per  patient's urine has been foul smelling and cloudy. Per  patient has also been complaining of suprapubic pain and dysuria at home.  -Afebrile, WBC 3.80 (chronic leukopenia).  -UA reviewed, follow urine cx.  -Pt received IV rocephin in the ED, continue for now.    Hyponatremia  Patient has hyponatremia which is uncontrolled,We will aim to correct the sodium by 4-6mEq in 24 hours. We will monitor sodium Daily. The hyponatremia is due to Medications: SSRIs and renal insufficiency. We will obtain the following studies: Urine sodium, urine osmolality, serum osmolality. We will treat the hyponatremia with Hemodialysis and Removal of offending medications. The patient's sodium results have been reviewed and are listed below.  Recent Labs   Lab 06/21/24  1710   *       Severe obesity (BMI >= 40)  Body mass index is 49.92 kg/m². Morbid obesity complicates all aspects of disease management from diagnostic modalities to treatment.     ESRD (end stage renal disease)  Chronic kidney disease-mineral and bone disorder   MWF schedule, last dialyzed 6/21  Residual renal function?- Yes    - Nephrology consulted  - Continue chronic hemodialysis  - Monitor daily electrolytes and defer dialysis orders to nephrology  - Renally dose medications  - Renal diet  - Continue phosphorus binders  - Daily weights/strict I&Os  - 1.5L FR    Debility  Patient with Chronic debility due to other reduced mobility. Latest AMPAC and GEMS scores have not been reviewed. Plan includes progressive mobility protocol initated and fall precautions in place.    S/P AKA (above knee amputation) bilateral  History noted.  -Fall precautions.    Chronic combined systolic and  "diastolic heart failure  Patient is identified as having Combined Systolic and Diastolic heart failure that is Chronic. CHF is currently controlled. Latest ECHO performed and demonstrates- Results for orders placed during the hospital encounter of 05/07/24    Echo    Interpretation Summary    Left Ventricle: The left ventricle is mildly dilated. Ventricular mass is normal. Normal wall thickness. Severe global hypokinesis present. There is severely reduced systolic function. Ejection fraction by visual approximation is 15%. There is diastolic dysfunction.    Right Ventricle: Severe right ventricular enlargement. Wall thickness is normal. Right ventricle wall motion has global hypokinesis. Systolic function is mildly reduced.    Left Atrium: Left atrium is severely dilated. There is an atrial septal aneurysm.    Right Atrium: Right atrium is dilated.    Aortic Valve: There is mild aortic regurgitation.    Mitral Valve: There is mild regurgitation.    Tricuspid Valve: There is annular dilation present. There is normal leaflet mobility. There is severe functional regurgitation.    Pulmonary Artery: The estimated pulmonary artery systolic pressure is at least 24 mmHg. PASP is likely under-estimated in the setting of severe tricuspid regurgitation.    IVC/SVC: Central venous pressure of at least 8 mmHg.    No vegetation seen.  . Continue Beta Blocker and monitor clinical status closely. Monitor on telemetry. Patient is off CHF pathway.  Monitor strict Is&Os and daily weights.  Place on fluid restriction of 1.5 L. Continue to stress to patient importance of self efficacy and  on diet for CHF. Last BNP reviewed- and noted below No results for input(s): "BNP", "BNPTRIAGEBLO" in the last 168 hours..  -Volume management with HD.      Paroxysmal atrial fibrillation  Patient with Paroxysmal (<7 days) atrial fibrillation which is controlled currently with Beta Blocker. Patient is currently in sinus rhythm.[id number] Score: " 3. Anticoagulation indicated. Anticoagulation done with eliquis .    Anemia due to chronic kidney disease, on chronic dialysis  Patient's anemia is currently controlled. Has not received any PRBCs to date. Etiology likely d/t chronic disease due to ESRD  Current CBC reviewed-   Lab Results   Component Value Date    HGB 11.0 (L) 06/21/2024    HCT 34.7 (L) 06/21/2024     Monitor serial CBC and transfuse if patient becomes hemodynamically unstable, symptomatic or H/H drops below 7/21.    CAD (coronary artery disease)  Patient with known CAD s/p CABG, which is controlled Will continue Plavix and Statin and monitor for S/Sx of angina/ACS. Continue to monitor on telemetry.     Type 2 diabetes mellitus with hyperglycemia, with long-term current use of insulin  Patient's FSGs are not controlled on current hypoglycemics due to hypoglycemia.   Last A1c reviewed-   Lab Results   Component Value Date    HGBA1C 8.0 (H) 04/24/2024     Most recent fingerstick glucose reviewed-   Recent Labs   Lab 06/21/24 2016 06/21/24  2329   POCTGLUCOSE 150* 134*     Current correctional scale  Low  Maintain anti-hyperglycemic dose as follows-   Antihyperglycemics (From admission, onward)      Start     Stop Route Frequency Ordered    06/22/24 0032  insulin aspart U-100 pen 0-5 Units         -- SubQ Before meals & nightly PRN 06/21/24 2332        -Pt initially hypoglycemic which improved with D10 bolus.   -Accuchecks AC/HS    CAROLIN (generalized anxiety disorder)  -Chronic, uncontrolled.  -Hold zoloft for now pending hyponatremia work up.   -Can consider psychiatry consult.    Peripheral vascular disease  -Chronic issue.  -Continue plavix and statin.      VTE Risk Mitigation (From admission, onward)           Ordered     apixaban tablet 5 mg  2 times daily         06/21/24 2332     IP VTE HIGH RISK PATIENT  Once         06/21/24 2332     Place sequential compression device  Until discontinued         06/21/24 2332     Reason for No Pharmacological  VTE Prophylaxis  Once        Question:  Reasons:  Answer:  Already adequately anticoagulated on oral Anticoagulants    06/21/24 2332                         On 06/21/2024, patient should be placed in hospital observation services under my care in collaboration with Umair Bowens MD.           Angela Bae NP  Department of Hospital Medicine  [facility] - Emergency Dept

## 2024-06-22 NOTE — ED NOTES
Telemetry Verification   Patient placed on Telemetry Box  Verified with War Room  Box # [id number]   Monitor Tech War room   Rate 75   Rhythm NSR

## 2024-06-22 NOTE — HPI
Suyapa Connelly is a 58 y.o. female with a PMHx of HTN, GERD, HLD, PVD, ESRD on HD MFW, b/l AKAs, CAD s/p CABG, afib on eliquis, depression, and anxiety who presents to the ED for evaluation of confusion. HPI and ROS limited secondary to patient's mental status and poor participation. Per patient's  she has been more sleepy and less interactive over the past several days. He reports that her urine has been foul-smelling and more cloudy. He states she was also peeing more frequently in complaining of burning with urination and some occasional suprapubic pain. Denies any fevers or chills. The patient denies CP, SOB, abdominal pain, or dysuria. Per  patient was dialyzed today a full dialysis and they came straight here after.     In the ED, VSS, afebrile. CBC with stable leukopenia and anemia. Na 129, Chloride 94, Bicarb 22, Glucose 50. POC glucose 150. Cr 2.7 (consistent with ESRD). Albumin 3.2. Tbili 1.1. Mag 2.1. UA with 3+ leukocytosis, 74 WBCs, but no bacteria. CTH with no acute finding or detrimental change when compared with 06/11/2024, allowing for motion and artifact limitations. The patient received 1g IV Rocephin.

## 2024-06-22 NOTE — SUBJECTIVE & OBJECTIVE
Interval History: Patient seen and assessed at bedside with  present. Afebrile, VSS. AAOx3, but  stating patient still not at baseline. Continuing treatment with rocephin, following urine culture.       Review of Systems   Unable to perform ROS: Mental status change   Respiratory:  Negative for shortness of breath.    Cardiovascular:  Negative for chest pain.   Gastrointestinal:  Negative for abdominal pain and nausea.   Genitourinary:  Negative for dysuria and frequency.     Objective:     Vital Signs (Most Recent):  Temp: 98.4 °F (36.9 °C) (06/22/24 1141)  Pulse: 80 (06/22/24 1141)  Resp: 18 (06/22/24 1141)  BP: (!) 121/56 (06/22/24 1141)  SpO2: 97 % (06/22/24 1141) Vital Signs (24h Range):  Temp:  [97.4 °F (36.3 °C)-98.4 °F (36.9 °C)] 98.4 °F (36.9 °C)  Pulse:  [72-87] 80  Resp:  [11-20] 18  SpO2:  [92 %-97 %] 97 %  BP: (114-171)/(56-99) 121/56     Weight: 74.2 kg (163 lb 9.3 oz)  Body mass index is 49.92 kg/m².    Intake/Output Summary (Last 24 hours) at 6/22/2024 1227  Last data filed at 6/21/2024 1918  Gross per 24 hour   Intake 125 ml   Output --   Net 125 ml         Physical Exam  Vitals and nursing note reviewed.   Constitutional:       Appearance: She is not toxic-appearing or diaphoretic.      Comments: Chronically ill appearing   HENT:      Head: Normocephalic and atraumatic.      Nose: Nose normal.      Mouth/Throat:      Mouth: Mucous membranes are moist.   Eyes:      Pupils: Pupils are equal, round, and reactive to light.   Cardiovascular:      Rate and Rhythm: Normal rate and regular rhythm.      Pulses: Normal pulses.   Pulmonary:      Effort: Pulmonary effort is normal. No respiratory distress.      Breath sounds: No wheezing, rhonchi or rales.   Abdominal:      General: Bowel sounds are normal. There is no distension.      Palpations: Abdomen is soft.      Tenderness: There is abdominal tenderness (RLQ/suprapubic). There is no guarding.   Musculoskeletal:         General: Normal  range of motion.      Cervical back: Normal range of motion.      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      Left Lower Extremity: Left leg is amputated above knee.   Skin:     General: Skin is warm and dry.      Capillary Refill: Capillary refill takes less than 2 seconds.   Neurological:      General: No focal deficit present.      Mental Status: She is alert.      Cranial Nerves: No facial asymmetry.      Motor: No tremor or seizure activity.      Comments: AAOx3. Will nod yes or no, occasionally answer questions, and follows basic commands. Minimally participatory with examination.   Psychiatric:         Mood and Affect: Affect is tearful.         Speech: She is noncommunicative.         Behavior: Behavior is cooperative.             Significant Labs: All pertinent labs within the past 24 hours have been reviewed.    Significant Imaging: I have reviewed all pertinent imaging results/findings within the past 24 hours.

## 2024-06-23 LAB
ALBUMIN SERPL BCP-MCNC: 3 G/DL (ref 3.5–5.2)
ALP SERPL-CCNC: 129 U/L (ref 55–135)
ALT SERPL W/O P-5'-P-CCNC: 11 U/L (ref 10–44)
ANION GAP SERPL CALC-SCNC: 10 MMOL/L (ref 8–16)
AST SERPL-CCNC: 19 U/L (ref 10–40)
BACTERIA UR CULT: ABNORMAL
BASOPHILS # BLD AUTO: 0.05 K/UL (ref 0–0.2)
BASOPHILS NFR BLD: 1.4 % (ref 0–1.9)
BILIRUB SERPL-MCNC: 0.9 MG/DL (ref 0.1–1)
BUN SERPL-MCNC: 37 MG/DL (ref 6–20)
CALCIUM SERPL-MCNC: 8.8 MG/DL (ref 8.7–10.5)
CHLORIDE SERPL-SCNC: 93 MMOL/L (ref 95–110)
CO2 SERPL-SCNC: 25 MMOL/L (ref 23–29)
CREAT SERPL-MCNC: 4.7 MG/DL (ref 0.5–1.4)
DIFFERENTIAL METHOD BLD: ABNORMAL
EOSINOPHIL # BLD AUTO: 0.1 K/UL (ref 0–0.5)
EOSINOPHIL NFR BLD: 2.7 % (ref 0–8)
ERYTHROCYTE [DISTWIDTH] IN BLOOD BY AUTOMATED COUNT: 18.6 % (ref 11.5–14.5)
EST. GFR  (NO RACE VARIABLE): 10.3 ML/MIN/1.73 M^2
GLUCOSE SERPL-MCNC: 224 MG/DL (ref 70–110)
HCT VFR BLD AUTO: 32.4 % (ref 37–48.5)
HGB BLD-MCNC: 10.4 G/DL (ref 12–16)
IMM GRANULOCYTES # BLD AUTO: 0.01 K/UL (ref 0–0.04)
IMM GRANULOCYTES NFR BLD AUTO: 0.3 % (ref 0–0.5)
LYMPHOCYTES # BLD AUTO: 0.5 K/UL (ref 1–4.8)
LYMPHOCYTES NFR BLD: 14.3 % (ref 18–48)
MAGNESIUM SERPL-MCNC: 2.1 MG/DL (ref 1.6–2.6)
MCH RBC QN AUTO: 32.7 PG (ref 27–31)
MCHC RBC AUTO-ENTMCNC: 32.1 G/DL (ref 32–36)
MCV RBC AUTO: 102 FL (ref 82–98)
MONOCYTES # BLD AUTO: 0.5 K/UL (ref 0.3–1)
MONOCYTES NFR BLD: 14.3 % (ref 4–15)
NEUTROPHILS # BLD AUTO: 2.5 K/UL (ref 1.8–7.7)
NEUTROPHILS NFR BLD: 67 % (ref 38–73)
NRBC BLD-RTO: 0 /100 WBC
PLATELET # BLD AUTO: 180 K/UL (ref 150–450)
PMV BLD AUTO: 10.7 FL (ref 9.2–12.9)
POCT GLUCOSE: 200 MG/DL (ref 70–110)
POCT GLUCOSE: 229 MG/DL (ref 70–110)
POCT GLUCOSE: 257 MG/DL (ref 70–110)
POCT GLUCOSE: 262 MG/DL (ref 70–110)
POTASSIUM SERPL-SCNC: 4.4 MMOL/L (ref 3.5–5.1)
PROT SERPL-MCNC: 7.8 G/DL (ref 6–8.4)
RBC # BLD AUTO: 3.18 M/UL (ref 4–5.4)
SODIUM SERPL-SCNC: 128 MMOL/L (ref 136–145)
WBC # BLD AUTO: 3.7 K/UL (ref 3.9–12.7)

## 2024-06-23 PROCEDURE — 25000003 PHARM REV CODE 250: Mod: HCNC | Performed by: NURSE PRACTITIONER

## 2024-06-23 PROCEDURE — 11000001 HC ACUTE MED/SURG PRIVATE ROOM: Mod: HCNC

## 2024-06-23 PROCEDURE — 21400001 HC TELEMETRY ROOM: Mod: HCNC

## 2024-06-23 PROCEDURE — 85025 COMPLETE CBC W/AUTO DIFF WBC: CPT | Mod: HCNC | Performed by: NURSE PRACTITIONER

## 2024-06-23 PROCEDURE — 36415 COLL VENOUS BLD VENIPUNCTURE: CPT | Mod: HCNC | Performed by: NURSE PRACTITIONER

## 2024-06-23 PROCEDURE — 63600175 PHARM REV CODE 636 W HCPCS: Mod: HCNC | Performed by: NURSE PRACTITIONER

## 2024-06-23 PROCEDURE — 25000003 PHARM REV CODE 250: Mod: HCNC

## 2024-06-23 PROCEDURE — 80053 COMPREHEN METABOLIC PANEL: CPT | Mod: HCNC | Performed by: NURSE PRACTITIONER

## 2024-06-23 PROCEDURE — 83735 ASSAY OF MAGNESIUM: CPT | Mod: HCNC | Performed by: NURSE PRACTITIONER

## 2024-06-23 RX ORDER — DIPHENHYDRAMINE HCL 25 MG
25 CAPSULE ORAL EVERY 6 HOURS PRN
Status: DISCONTINUED | OUTPATIENT
Start: 2024-06-23 | End: 2024-07-05 | Stop reason: HOSPADM

## 2024-06-23 RX ORDER — HYDROXYZINE HYDROCHLORIDE 25 MG/1
25 TABLET, FILM COATED ORAL 3 TIMES DAILY PRN
Status: DISCONTINUED | OUTPATIENT
Start: 2024-06-23 | End: 2024-07-05 | Stop reason: HOSPADM

## 2024-06-23 RX ORDER — HYDROCORTISONE 25 MG/G
CREAM TOPICAL 2 TIMES DAILY PRN
Status: DISCONTINUED | OUTPATIENT
Start: 2024-06-23 | End: 2024-06-26

## 2024-06-23 RX ORDER — DIPHENHYDRAMINE HYDROCHLORIDE 50 MG/ML
25 INJECTION INTRAMUSCULAR; INTRAVENOUS EVERY 6 HOURS PRN
Status: DISCONTINUED | OUTPATIENT
Start: 2024-06-23 | End: 2024-06-23

## 2024-06-23 RX ADMIN — INSULIN ASPART 3 UNITS: 100 INJECTION, SOLUTION INTRAVENOUS; SUBCUTANEOUS at 04:06

## 2024-06-23 RX ADMIN — HYDRALAZINE HYDROCHLORIDE 50 MG: 50 TABLET ORAL at 03:06

## 2024-06-23 RX ADMIN — SODIUM BICARBONATE 650 MG TABLET 650 MG: at 08:06

## 2024-06-23 RX ADMIN — CALCIUM ACETATE 667 MG: 667 CAPSULE ORAL at 11:06

## 2024-06-23 RX ADMIN — SODIUM BICARBONATE 650 MG TABLET 650 MG: at 03:06

## 2024-06-23 RX ADMIN — CALCIUM ACETATE 667 MG: 667 CAPSULE ORAL at 08:06

## 2024-06-23 RX ADMIN — CALCITRIOL 0.5 MCG: 0.5 CAPSULE, LIQUID FILLED ORAL at 08:06

## 2024-06-23 RX ADMIN — APIXABAN 5 MG: 5 TABLET, FILM COATED ORAL at 08:06

## 2024-06-23 RX ADMIN — HYDRALAZINE HYDROCHLORIDE 50 MG: 50 TABLET ORAL at 08:06

## 2024-06-23 RX ADMIN — CALCIUM ACETATE 667 MG: 667 CAPSULE ORAL at 04:06

## 2024-06-23 RX ADMIN — INSULIN ASPART 2 UNITS: 100 INJECTION, SOLUTION INTRAVENOUS; SUBCUTANEOUS at 08:06

## 2024-06-23 RX ADMIN — SODIUM BICARBONATE 650 MG TABLET 650 MG: at 09:06

## 2024-06-23 RX ADMIN — DIPHENHYDRAMINE HYDROCHLORIDE 25 MG: 25 CAPSULE ORAL at 04:06

## 2024-06-23 RX ADMIN — CARVEDILOL 6.25 MG: 6.25 TABLET, FILM COATED ORAL at 08:06

## 2024-06-23 RX ADMIN — HYDROXYZINE HYDROCHLORIDE 25 MG: 25 TABLET, FILM COATED ORAL at 09:06

## 2024-06-23 RX ADMIN — APIXABAN 5 MG: 5 TABLET, FILM COATED ORAL at 09:06

## 2024-06-23 RX ADMIN — ATORVASTATIN CALCIUM 80 MG: 40 TABLET, FILM COATED ORAL at 08:06

## 2024-06-23 RX ADMIN — Medication 6 MG: at 10:06

## 2024-06-23 RX ADMIN — HYDRALAZINE HYDROCHLORIDE 50 MG: 50 TABLET ORAL at 10:06

## 2024-06-23 RX ADMIN — CARVEDILOL 6.25 MG: 6.25 TABLET, FILM COATED ORAL at 09:06

## 2024-06-23 RX ADMIN — CLOPIDOGREL BISULFATE 75 MG: 75 TABLET ORAL at 08:06

## 2024-06-23 RX ADMIN — FAMOTIDINE 20 MG: 20 TABLET ORAL at 08:06

## 2024-06-23 RX ADMIN — ACETAMINOPHEN 1000 MG: 500 TABLET ORAL at 10:06

## 2024-06-23 NOTE — ASSESSMENT & PLAN NOTE
Patient with Paroxysmal (<7 days) atrial fibrillation which is controlled currently with Beta Blocker. Patient is currently in sinus rhythm.[id number] Score: 3. Anticoagulation indicated. Anticoagulation done with eliquis .

## 2024-06-23 NOTE — ASSESSMENT & PLAN NOTE
Patient has hyponatremia which is uncontrolled,We will aim to correct the sodium by 4-6mEq in 24 hours. We will monitor sodium Daily. The hyponatremia is due to Medications: SSRIs and renal insufficiency. We will obtain the following studies: Urine sodium, urine osmolality, serum osmolality. We will treat the hyponatremia with Hemodialysis and Removal of offending medications. The patient's sodium results have been reviewed and are listed below.  Recent Labs   Lab 06/23/24  0517   *     - patient with chronic hyponatremia, holding zoloft for now

## 2024-06-23 NOTE — ASSESSMENT & PLAN NOTE
-Pt denies any abd pain, dysuria, urinary frequency, or fever/chills. Per  patient's urine has been foul smelling and cloudy. Per  patient has also been complaining of suprapubic pain and dysuria at home.  -Afebrile, WBC 3.80 (chronic leukopenia).  -UA reviewed, follow urine cx.  -Pt received IV rocephin in the ED, continue for now.  -culture with candida krusei, ID consulted for recommendations

## 2024-06-23 NOTE — ASSESSMENT & PLAN NOTE
Patient's anemia is currently controlled. Has not received any PRBCs to date. Etiology likely d/t chronic disease due to ESRD  Current CBC reviewed-   Lab Results   Component Value Date    HGB 10.4 (L) 06/23/2024    HCT 32.4 (L) 06/23/2024     Monitor serial CBC and transfuse if patient becomes hemodynamically unstable, symptomatic or H/H drops below 7/21.

## 2024-06-23 NOTE — SUBJECTIVE & OBJECTIVE
"Interval History: Patient seen and assessed at bedside. Afebrile, VSS. Overnight, patient found by nursing staff to be on the ground attempting to ambulate to the bathroom.  at bedside asleep during this episode, did not witness her getting out of bed. Patient states she did not fall out the bed, she "slid". Does not report any injuries to myself or nursing. Ucx with candiduria, ID consulted for treatment recommendations.       Review of Systems   Unable to perform ROS: Mental status change   Respiratory:  Negative for shortness of breath.    Cardiovascular:  Negative for chest pain.   Gastrointestinal:  Positive for abdominal pain. Negative for nausea.   Genitourinary:  Positive for dysuria. Negative for frequency.     Objective:     Vital Signs (Most Recent):  Temp: 97.7 °F (36.5 °C) (06/23/24 1206)  Pulse: 81 (06/23/24 1206)  Resp: 18 (06/23/24 1206)  BP: (!) 144/63 (06/23/24 1206)  SpO2: (!) 93 % (06/23/24 1206) Vital Signs (24h Range):  Temp:  [97.7 °F (36.5 °C)-98.6 °F (37 °C)] 97.7 °F (36.5 °C)  Pulse:  [79-87] 81  Resp:  [17-18] 18  SpO2:  [93 %-97 %] 93 %  BP: (111-159)/(54-77) 144/63     Weight: 74.2 kg (163 lb 9.3 oz)  Body mass index is 49.92 kg/m².    Intake/Output Summary (Last 24 hours) at 6/23/2024 1402  Last data filed at 6/23/2024 0406  Gross per 24 hour   Intake --   Output 400 ml   Net -400 ml         Physical Exam  Vitals and nursing note reviewed.   Constitutional:       Appearance: She is not toxic-appearing or diaphoretic.      Comments: Chronically ill appearing   HENT:      Head: Normocephalic and atraumatic.      Nose: Nose normal.      Mouth/Throat:      Mouth: Mucous membranes are moist.   Eyes:      Pupils: Pupils are equal, round, and reactive to light.   Cardiovascular:      Rate and Rhythm: Normal rate and regular rhythm.      Pulses: Normal pulses.   Pulmonary:      Effort: Pulmonary effort is normal. No respiratory distress.      Breath sounds: No wheezing, rhonchi or rales. "   Abdominal:      General: Bowel sounds are normal. There is no distension.      Palpations: Abdomen is soft.      Tenderness: There is abdominal tenderness (RLQ/suprapubic). There is no guarding.   Genitourinary:     Comments: Patient noted to have dark urine from pure wick   Musculoskeletal:         General: Normal range of motion.      Cervical back: Normal range of motion.      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      Left Lower Extremity: Left leg is amputated above knee.   Skin:     General: Skin is warm and dry.      Capillary Refill: Capillary refill takes less than 2 seconds.   Neurological:      General: No focal deficit present.      Mental Status: She is alert.      Cranial Nerves: No facial asymmetry.      Motor: No tremor or seizure activity.      Comments: AAOx3. Will nod yes or no, occasionally answer questions, and follows basic commands. Minimally participatory with examination.   Psychiatric:         Speech: She is noncommunicative.         Behavior: Behavior is cooperative.             Significant Labs: All pertinent labs within the past 24 hours have been reviewed.    Significant Imaging: I have reviewed all pertinent imaging results/findings within the past 24 hours.

## 2024-06-23 NOTE — ASSESSMENT & PLAN NOTE
Patient's FSGs are not controlled on current hypoglycemics due to hypoglycemia.   Last A1c reviewed-   Lab Results   Component Value Date    HGBA1C 8.0 (H) 04/24/2024     Most recent fingerstick glucose reviewed-   Recent Labs   Lab 06/22/24  1646 06/22/24  2133 06/23/24  0729 06/23/24  1126   POCTGLUCOSE 202* 162* 229* 200*       Current correctional scale  Low  Maintain anti-hyperglycemic dose as follows-   Antihyperglycemics (From admission, onward)    Start     Stop Route Frequency Ordered    06/22/24 0032  insulin aspart U-100 pen 0-5 Units         -- SubQ Before meals & nightly PRN 06/21/24 2332      -Pt initially hypoglycemic which improved with D10 bolus.   -Accuchecks AC/HS

## 2024-06-23 NOTE — CONSULTS
HARSHA consulted for PIV insertion in real time using ultrasound guidance.    Indication: PVA  Gauge and length: 20 g x 1 3/4 inch  Location: [facility]

## 2024-06-23 NOTE — PROGRESS NOTES
[facility] [address] [place] [address] Medicine  Progress Note    Patient Name: Suyapa Connelly  MRN: [id number]  Patient Class: IP- Inpatient   Admission Date: 6/21/2024  Length of Stay: 1 days  Attending Physician: Frances Bee MD  Primary Care Provider: Feliciaon Nguyen III, PA-C        Subjective:     Principal Problem:Acute encephalopathy        HPI:  Suyapa Connelly is a 58 y.o. female with a PMHx of HTN, GERD, HLD, PVD, ESRD on HD MFW, b/l AKAs, CAD s/p CABG, afib on eliquis, depression, and anxiety who presents to the ED for evaluation of confusion. HPI and ROS limited secondary to patient's mental status and poor participation. Per patient's  she has been more sleepy and less interactive over the past several days. He reports that her urine has been foul-smelling and more cloudy. He states she was also peeing more frequently in complaining of burning with urination and some occasional suprapubic pain. Denies any fevers or chills. The patient denies CP, SOB, abdominal pain, or dysuria. Per  patient was dialyzed today a full dialysis and they came straight here after.     In the ED, VSS, afebrile. CBC with stable leukopenia and anemia. Na 129, Chloride 94, Bicarb 22, Glucose 50. POC glucose 150. Cr 2.7 (consistent with ESRD). Albumin 3.2. Tbili 1.1. Mag 2.1. UA with 3+ leukocytosis, 74 WBCs, but no bacteria. CTH with no acute finding or detrimental change when compared with 06/11/2024, allowing for motion and artifact limitations. The patient received 1g IV Rocephin.    Overview/Hospital Course:  Suyapa Connelly is a 58 y.o. F who was admitted to  for further evaluation of acute encephalopathy. Most likely 2/2 UTI, other workup negative. AAOx3 now. Initiated rocephin for infectious appearing UA. Following culture - grew Candida krusei, stopped rocephin. ID consulted for symptomatic candiduria treatment recommendations. CM assisting with discharge planning.       Interval History: Patient  "seen and assessed at bedside. Afebrile, VSS. Overnight, patient found by nursing staff to be on the ground attempting to ambulate to the bathroom.  at bedside asleep during this episode, did not witness her getting out of bed. Patient states she did not fall out the bed, she "slid". Does not report any injuries to myself or nursing. Ucx with candiduria, ID consulted for treatment recommendations.       Review of Systems   Unable to perform ROS: Mental status change   Respiratory:  Negative for shortness of breath.    Cardiovascular:  Negative for chest pain.   Gastrointestinal:  Positive for abdominal pain. Negative for nausea.   Genitourinary:  Positive for dysuria. Negative for frequency.     Objective:     Vital Signs (Most Recent):  Temp: 97.7 °F (36.5 °C) (06/23/24 1206)  Pulse: 81 (06/23/24 1206)  Resp: 18 (06/23/24 1206)  BP: (!) 144/63 (06/23/24 1206)  SpO2: (!) 93 % (06/23/24 1206) Vital Signs (24h Range):  Temp:  [97.7 °F (36.5 °C)-98.6 °F (37 °C)] 97.7 °F (36.5 °C)  Pulse:  [79-87] 81  Resp:  [17-18] 18  SpO2:  [93 %-97 %] 93 %  BP: (111-159)/(54-77) 144/63     Weight: 74.2 kg (163 lb 9.3 oz)  Body mass index is 49.92 kg/m².    Intake/Output Summary (Last 24 hours) at 6/23/2024 1402  Last data filed at 6/23/2024 0406  Gross per 24 hour   Intake --   Output 400 ml   Net -400 ml         Physical Exam  Vitals and nursing note reviewed.   Constitutional:       Appearance: She is not toxic-appearing or diaphoretic.      Comments: Chronically ill appearing   HENT:      Head: Normocephalic and atraumatic.      Nose: Nose normal.      Mouth/Throat:      Mouth: Mucous membranes are moist.   Eyes:      Pupils: Pupils are equal, round, and reactive to light.   Cardiovascular:      Rate and Rhythm: Normal rate and regular rhythm.      Pulses: Normal pulses.   Pulmonary:      Effort: Pulmonary effort is normal. No respiratory distress.      Breath sounds: No wheezing, rhonchi or rales.   Abdominal:      " General: Bowel sounds are normal. There is no distension.      Palpations: Abdomen is soft.      Tenderness: There is abdominal tenderness (RLQ/suprapubic). There is no guarding.   Genitourinary:     Comments: Patient noted to have dark urine from pure wick   Musculoskeletal:         General: Normal range of motion.      Cervical back: Normal range of motion.      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      Left Lower Extremity: Left leg is amputated above knee.   Skin:     General: Skin is warm and dry.      Capillary Refill: Capillary refill takes less than 2 seconds.   Neurological:      General: No focal deficit present.      Mental Status: She is alert.      Cranial Nerves: No facial asymmetry.      Motor: No tremor or seizure activity.      Comments: AAOx3. Will nod yes or no, occasionally answer questions, and follows basic commands. Minimally participatory with examination.   Psychiatric:         Speech: She is noncommunicative.         Behavior: Behavior is cooperative.             Significant Labs: All pertinent labs within the past 24 hours have been reviewed.    Significant Imaging: I have reviewed all pertinent imaging results/findings within the past 24 hours.    Assessment/Plan:      * Acute encephalopathy  Patient has acute metabolic encephalopathy that likely secondary to UTI.Treatment for underlying cause is underway. Pt with recurrent admissions for encephalopathy with extensive work ups including EEG, CTH, MRI brain, and vitamin panels. Will monitor neuro status carefully, avoid narcotics and benzos that will exacerbate agitation, and use PRN anti-psychotics for controls of behavior for self harm.  -Initially hypoglycemic which improved with treatment. Na 129.  -UA concerning for infection.  -ucx with candida krusei, ID consulted for treatment recommendations  -CXR with diffuse increased interstitial and parenchymal attenuation similar to prior study. No large region of  confluent airspace consolidation or substantial volume of pleural fluid identified.   -CTH-negative  -Delirium precautions   - reports progressive decline in patient over the last few months. Initiated NH paperwork for patient. CM notified for assistance     Severe obesity (BMI >= 40)  Body mass index is 49.92 kg/m². Morbid obesity complicates all aspects of disease management from diagnostic modalities to treatment.     ESRD (end stage renal disease)  Chronic kidney disease-mineral and bone disorder   MWF schedule, last dialyzed 6/21  Residual renal function?- Yes    - Nephrology consulted  - Continue chronic hemodialysis  - Monitor daily electrolytes and defer dialysis orders to nephrology  - Renally dose medications  - Renal diet  - Continue phosphorus binders  - Daily weights/strict I&Os  - 1.5L FR    Debility  Patient with Chronic debility due to other reduced mobility. Latest AMPAC and GEMS scores have not been reviewed. Plan includes progressive mobility protocol initated and fall precautions in place.  - family initiated NH paperwork  - CM notified    S/P AKA (above knee amputation) bilateral  History noted.  -Fall precautions.    Chronic combined systolic and diastolic heart failure  Patient is identified as having Combined Systolic and Diastolic heart failure that is Chronic. CHF is currently controlled. Latest ECHO performed and demonstrates- Results for orders placed during the hospital encounter of 05/07/24    Echo    Interpretation Summary    Left Ventricle: The left ventricle is mildly dilated. Ventricular mass is normal. Normal wall thickness. Severe global hypokinesis present. There is severely reduced systolic function. Ejection fraction by visual approximation is 15%. There is diastolic dysfunction.    Right Ventricle: Severe right ventricular enlargement. Wall thickness is normal. Right ventricle wall motion has global hypokinesis. Systolic function is mildly reduced.    Left Atrium: Left  "atrium is severely dilated. There is an atrial septal aneurysm.    Right Atrium: Right atrium is dilated.    Aortic Valve: There is mild aortic regurgitation.    Mitral Valve: There is mild regurgitation.    Tricuspid Valve: There is annular dilation present. There is normal leaflet mobility. There is severe functional regurgitation.    Pulmonary Artery: The estimated pulmonary artery systolic pressure is at least 24 mmHg. PASP is likely under-estimated in the setting of severe tricuspid regurgitation.    IVC/SVC: Central venous pressure of at least 8 mmHg.    No vegetation seen.  . Continue Beta Blocker and monitor clinical status closely. Monitor on telemetry. Patient is off CHF pathway.  Monitor strict Is&Os and daily weights.  Place on fluid restriction of 1.5 L. Continue to stress to patient importance of self efficacy and  on diet for CHF. Last BNP reviewed- and noted below No results for input(s): "BNP", "BNPTRIAGEBLO" in the last 168 hours..  -Volume management with HD.      Hyponatremia  Patient has hyponatremia which is uncontrolled,We will aim to correct the sodium by 4-6mEq in 24 hours. We will monitor sodium Daily. The hyponatremia is due to Medications: SSRIs and renal insufficiency. We will obtain the following studies: Urine sodium, urine osmolality, serum osmolality. We will treat the hyponatremia with Hemodialysis and Removal of offending medications. The patient's sodium results have been reviewed and are listed below.  Recent Labs   Lab 06/23/24  0517   *     - patient with chronic hyponatremia, holding zoloft for now     Paroxysmal atrial fibrillation  Patient with Paroxysmal (<7 days) atrial fibrillation which is controlled currently with Beta Blocker. Patient is currently in sinus rhythm.[id number] Score: 3. Anticoagulation indicated. Anticoagulation done with eliquis .    Anemia due to chronic kidney disease, on chronic dialysis  Patient's anemia is currently controlled. Has not " received any PRBCs to date. Etiology likely d/t chronic disease due to ESRD  Current CBC reviewed-   Lab Results   Component Value Date    HGB 10.4 (L) 06/23/2024    HCT 32.4 (L) 06/23/2024     Monitor serial CBC and transfuse if patient becomes hemodynamically unstable, symptomatic or H/H drops below 7/21.    CAD (coronary artery disease)  Patient with known CAD s/p CABG, which is controlled Will continue Plavix and Statin and monitor for S/Sx of angina/ACS. Continue to monitor on telemetry.     Type 2 diabetes mellitus with hyperglycemia, with long-term current use of insulin  Patient's FSGs are not controlled on current hypoglycemics due to hypoglycemia.   Last A1c reviewed-   Lab Results   Component Value Date    HGBA1C 8.0 (H) 04/24/2024     Most recent fingerstick glucose reviewed-   Recent Labs   Lab 06/22/24  1646 06/22/24  2133 06/23/24  0729 06/23/24  1126   POCTGLUCOSE 202* 162* 229* 200*       Current correctional scale  Low  Maintain anti-hyperglycemic dose as follows-   Antihyperglycemics (From admission, onward)      Start     Stop Route Frequency Ordered    06/22/24 0032  insulin aspart U-100 pen 0-5 Units         -- SubQ Before meals & nightly PRN 06/21/24 2332        -Pt initially hypoglycemic which improved with D10 bolus.   -Accuchecks AC/HS    Acute cystitis without hematuria  -Pt denies any abd pain, dysuria, urinary frequency, or fever/chills. Per  patient's urine has been foul smelling and cloudy. Per  patient has also been complaining of suprapubic pain and dysuria at home.  -Afebrile, WBC 3.80 (chronic leukopenia).  -UA reviewed, follow urine cx.  -Pt received IV rocephin in the ED, continue for now.  -culture with candida krusei, ID consulted for recommendations    CAROLIN (generalized anxiety disorder)  -Chronic, uncontrolled.  -Hold zoloft for now pending hyponatremia work up.   -Can consider psychiatry consult.    Peripheral vascular disease  -Chronic issue.  -Continue plavix  and statin.      VTE Risk Mitigation (From admission, onward)           Ordered     apixaban tablet 5 mg  2 times daily         06/21/24 2332     IP VTE HIGH RISK PATIENT  Once         06/21/24 2332     Reason for No Pharmacological VTE Prophylaxis  Once        Question:  Reasons:  Answer:  Already adequately anticoagulated on oral Anticoagulants    06/21/24 2332                    Discharge Planning   DEVAN: 6/24/2024     Code Status: Full Code   Is the patient medically ready for discharge?:     Reason for patient still in hospital (select all that apply): Patient trending condition, Treatment, Consult recommendations, and Pending disposition  Discharge Plan A: New Nursing Home placement - long-term care facility                  Viry Griffin PA-C  Department of Hospital Medicine   [facility] - Observation 11H

## 2024-06-23 NOTE — PLAN OF CARE
CM received message from care team, patient/family in process of NH placement ([facility]). CHW assisted CM with expediting referral for [facility]. Patient is inpatient class. Added weekday CM staff to secure chat for follow-up.      Matilda Donaldson RN  Weekend  - [facility] [place]  Spectralink: [phone]

## 2024-06-23 NOTE — PLAN OF CARE
CHW called [facility] @ [phone], spoke with Sussy Chamberlain (rep) stated admission will return on Monday. CHW faxed referral to [phone].

## 2024-06-24 LAB
ALBUMIN SERPL BCP-MCNC: 3.1 G/DL (ref 3.5–5.2)
ALP SERPL-CCNC: 162 U/L (ref 55–135)
ALT SERPL W/O P-5'-P-CCNC: 9 U/L (ref 10–44)
ANION GAP SERPL CALC-SCNC: 12 MMOL/L (ref 8–16)
AST SERPL-CCNC: 18 U/L (ref 10–40)
BASOPHILS # BLD AUTO: 0.04 K/UL (ref 0–0.2)
BASOPHILS NFR BLD: 0.9 % (ref 0–1.9)
BILIRUB SERPL-MCNC: 0.7 MG/DL (ref 0.1–1)
BUN SERPL-MCNC: 20 MG/DL (ref 6–20)
BUN SERPL-MCNC: 54 MG/DL (ref 6–20)
BUN SERPL-MCNC: 57 MG/DL (ref 6–20)
CALCIUM SERPL-MCNC: 9.3 MG/DL (ref 8.7–10.5)
CHLORIDE SERPL-SCNC: 96 MMOL/L (ref 95–110)
CO2 SERPL-SCNC: 24 MMOL/L (ref 23–29)
CREAT SERPL-MCNC: 5.1 MG/DL (ref 0.5–1.4)
DIFFERENTIAL METHOD BLD: ABNORMAL
EOSINOPHIL # BLD AUTO: 0.1 K/UL (ref 0–0.5)
EOSINOPHIL NFR BLD: 2.6 % (ref 0–8)
ERYTHROCYTE [DISTWIDTH] IN BLOOD BY AUTOMATED COUNT: 18.3 % (ref 11.5–14.5)
EST. GFR  (NO RACE VARIABLE): 9.3 ML/MIN/1.73 M^2
GLUCOSE SERPL-MCNC: 203 MG/DL (ref 70–110)
HCT VFR BLD AUTO: 29.8 % (ref 37–48.5)
HGB BLD-MCNC: 9.7 G/DL (ref 12–16)
IMM GRANULOCYTES # BLD AUTO: 0.01 K/UL (ref 0–0.04)
IMM GRANULOCYTES NFR BLD AUTO: 0.2 % (ref 0–0.5)
LYMPHOCYTES # BLD AUTO: 0.6 K/UL (ref 1–4.8)
LYMPHOCYTES NFR BLD: 14.2 % (ref 18–48)
MAGNESIUM SERPL-MCNC: 2.3 MG/DL (ref 1.6–2.6)
MCH RBC QN AUTO: 32.7 PG (ref 27–31)
MCHC RBC AUTO-ENTMCNC: 32.6 G/DL (ref 32–36)
MCV RBC AUTO: 100 FL (ref 82–98)
MONOCYTES # BLD AUTO: 0.7 K/UL (ref 0.3–1)
MONOCYTES NFR BLD: 16.3 % (ref 4–15)
NEUTROPHILS # BLD AUTO: 2.8 K/UL (ref 1.8–7.7)
NEUTROPHILS NFR BLD: 65.8 % (ref 38–73)
NRBC BLD-RTO: 0 /100 WBC
PHOSPHATE SERPL-MCNC: 4.9 MG/DL (ref 2.7–4.5)
PLATELET # BLD AUTO: 160 K/UL (ref 150–450)
PMV BLD AUTO: 10.4 FL (ref 9.2–12.9)
POCT GLUCOSE: 101 MG/DL (ref 70–110)
POCT GLUCOSE: 143 MG/DL (ref 70–110)
POCT GLUCOSE: 190 MG/DL (ref 70–110)
POTASSIUM SERPL-SCNC: 4.9 MMOL/L (ref 3.5–5.1)
PROT SERPL-MCNC: 7.8 G/DL (ref 6–8.4)
RBC # BLD AUTO: 2.97 M/UL (ref 4–5.4)
SODIUM SERPL-SCNC: 132 MMOL/L (ref 136–145)
WBC # BLD AUTO: 4.3 K/UL (ref 3.9–12.7)

## 2024-06-24 PROCEDURE — 90935 HEMODIALYSIS ONE EVALUATION: CPT | Mod: HCNC,,, | Performed by: NURSE PRACTITIONER

## 2024-06-24 PROCEDURE — 99223 1ST HOSP IP/OBS HIGH 75: CPT | Mod: HCNC,,, | Performed by: INTERNAL MEDICINE

## 2024-06-24 PROCEDURE — 63600175 PHARM REV CODE 636 W HCPCS: Mod: HCNC

## 2024-06-24 PROCEDURE — 25000003 PHARM REV CODE 250: Mod: HCNC | Performed by: NURSE PRACTITIONER

## 2024-06-24 PROCEDURE — 21400001 HC TELEMETRY ROOM: Mod: HCNC

## 2024-06-24 PROCEDURE — 85025 COMPLETE CBC W/AUTO DIFF WBC: CPT | Mod: HCNC | Performed by: NURSE PRACTITIONER

## 2024-06-24 PROCEDURE — 84520 ASSAY OF UREA NITROGEN: CPT | Mod: HCNC | Performed by: HOSPITALIST

## 2024-06-24 PROCEDURE — 5A1D70Z PERFORMANCE OF URINARY FILTRATION, INTERMITTENT, LESS THAN 6 HOURS PER DAY: ICD-10-PCS | Performed by: NURSE PRACTITIONER

## 2024-06-24 PROCEDURE — 11000001 HC ACUTE MED/SURG PRIVATE ROOM: Mod: HCNC

## 2024-06-24 PROCEDURE — 80053 COMPREHEN METABOLIC PANEL: CPT | Mod: HCNC | Performed by: NURSE PRACTITIONER

## 2024-06-24 PROCEDURE — 84100 ASSAY OF PHOSPHORUS: CPT | Mod: HCNC | Performed by: HOSPITALIST

## 2024-06-24 PROCEDURE — 80100014 HC HEMODIALYSIS 1:1: Mod: HCNC

## 2024-06-24 PROCEDURE — 83735 ASSAY OF MAGNESIUM: CPT | Mod: HCNC | Performed by: NURSE PRACTITIONER

## 2024-06-24 PROCEDURE — 25000003 PHARM REV CODE 250: Mod: HCNC

## 2024-06-24 PROCEDURE — 36415 COLL VENOUS BLD VENIPUNCTURE: CPT | Mod: HCNC | Performed by: NURSE PRACTITIONER

## 2024-06-24 RX ORDER — INSULIN ASPART 100 [IU]/ML
3 INJECTION, SOLUTION INTRAVENOUS; SUBCUTANEOUS
Status: DISCONTINUED | OUTPATIENT
Start: 2024-06-24 | End: 2024-07-05 | Stop reason: HOSPADM

## 2024-06-24 RX ORDER — HEPARIN SODIUM 1000 [USP'U]/ML
1000 INJECTION, SOLUTION INTRAVENOUS; SUBCUTANEOUS
Status: DISCONTINUED | OUTPATIENT
Start: 2024-06-24 | End: 2024-07-05 | Stop reason: HOSPADM

## 2024-06-24 RX ORDER — DIPHENHYDRAMINE HYDROCHLORIDE 50 MG/ML
50 INJECTION INTRAMUSCULAR; INTRAVENOUS
Status: DISCONTINUED | OUTPATIENT
Start: 2024-06-24 | End: 2024-07-05 | Stop reason: HOSPADM

## 2024-06-24 RX ADMIN — HEPARIN SODIUM 1000 UNITS: 1000 INJECTION, SOLUTION INTRAVENOUS; SUBCUTANEOUS at 12:06

## 2024-06-24 RX ADMIN — CARVEDILOL 6.25 MG: 6.25 TABLET, FILM COATED ORAL at 01:06

## 2024-06-24 RX ADMIN — DIPHENHYDRAMINE HYDROCHLORIDE 25 MG: 25 CAPSULE ORAL at 08:06

## 2024-06-24 RX ADMIN — CALCITRIOL 0.5 MCG: 0.5 CAPSULE, LIQUID FILLED ORAL at 01:06

## 2024-06-24 RX ADMIN — CARVEDILOL 6.25 MG: 6.25 TABLET, FILM COATED ORAL at 09:06

## 2024-06-24 RX ADMIN — CALCIUM ACETATE 667 MG: 667 CAPSULE ORAL at 01:06

## 2024-06-24 RX ADMIN — SODIUM CHLORIDE: 9 INJECTION, SOLUTION INTRAVENOUS at 08:06

## 2024-06-24 RX ADMIN — FAMOTIDINE 20 MG: 20 TABLET ORAL at 01:06

## 2024-06-24 RX ADMIN — HYDRALAZINE HYDROCHLORIDE 50 MG: 50 TABLET ORAL at 11:06

## 2024-06-24 RX ADMIN — ALLOPURINOL 50 MG: 300 TABLET ORAL at 09:06

## 2024-06-24 RX ADMIN — ACETAMINOPHEN 1000 MG: 500 TABLET ORAL at 08:06

## 2024-06-24 RX ADMIN — HYDRALAZINE HYDROCHLORIDE 50 MG: 50 TABLET ORAL at 01:06

## 2024-06-24 RX ADMIN — ATORVASTATIN CALCIUM 80 MG: 40 TABLET, FILM COATED ORAL at 01:06

## 2024-06-24 RX ADMIN — INSULIN ASPART 3 UNITS: 100 INJECTION, SOLUTION INTRAVENOUS; SUBCUTANEOUS at 04:06

## 2024-06-24 RX ADMIN — CLOPIDOGREL BISULFATE 75 MG: 75 TABLET ORAL at 01:06

## 2024-06-24 RX ADMIN — APIXABAN 5 MG: 5 TABLET, FILM COATED ORAL at 11:06

## 2024-06-24 RX ADMIN — APIXABAN 5 MG: 5 TABLET, FILM COATED ORAL at 01:06

## 2024-06-24 NOTE — ASSESSMENT & PLAN NOTE
-Pt denies any abd pain, dysuria, urinary frequency, or fever/chills. Per  patient's urine has been foul smelling and cloudy. Per  patient has also been complaining of suprapubic pain and dysuria at home.  -Afebrile, WBC 3.80 (chronic leukopenia).  -UA reviewed, follow urine cx.  -Pt received IV rocephin in the ED, continue for now.  -culture with candida krusei, ID consulted for recommendations   - obtain CT abd/pelvis

## 2024-06-24 NOTE — ASSESSMENT & PLAN NOTE
Chronic kidney disease-mineral and bone disorder   MWF schedule, last dialyzed 6/21  Residual renal function?- Yes    - Nephrology consulted  - Continue chronic hemodialysis  - Monitor daily electrolytes and defer dialysis orders to nephrology  - Renally dose medications  - Renal diet  - Continue phosphorus binders  - Daily weights/strict I&Os  - 1.5L FR  - received HD 6/24

## 2024-06-24 NOTE — ASSESSMENT & PLAN NOTE
Patient's FSGs are not controlled on current hypoglycemics due to hypoglycemia.   Last A1c reviewed-   Lab Results   Component Value Date    HGBA1C 8.0 (H) 04/24/2024     Most recent fingerstick glucose reviewed-   Recent Labs   Lab 06/23/24  1620 06/23/24  2139   POCTGLUCOSE 262* 257*       Current correctional scale  Low  Maintain anti-hyperglycemic dose as follows-   Antihyperglycemics (From admission, onward)    Start     Stop Route Frequency Ordered    06/24/24 1645  insulin aspart U-100 pen 3 Units         -- SubQ 3 times daily with meals 06/24/24 1335    06/22/24 0032  insulin aspart U-100 pen 0-5 Units         -- SubQ Before meals & nightly PRN 06/21/24 2332      -Pt initially hypoglycemic which improved with D10 bolus.   -Accuchecks AC/HS

## 2024-06-24 NOTE — PROGRESS NOTES
[facility] HEMODIALYSIS NOTE     Patient currently on hemodialysis for removal of uremic toxins and volume.     Patient seen and evaluated on hemodialysis, tolerating treatment, see HD flowsheet for vitals and assessments.     Labs have been reviewed and the dialysate bath has been adjusted.        Assessment/Plan:     -Patient seen on HD, tolerating treatment well, confused  -UF goal of 2 L as tolerated  -Renal diet, if not NPO   -Strict I/O's and daily weights  -Daily renal function panels  -Keep MAP >65 while on HD   -Hgb goal 10-11, hgb 9.7, near target. EPO may be dosed at OP unit.  -Continue phos binders  -d/c po bicarb, bicarb bath with HD   -Will continue to follow while inpatient     Katie Monique NP   Nephrology

## 2024-06-24 NOTE — ASSESSMENT & PLAN NOTE
Patient's anemia is currently controlled. Has not received any PRBCs to date. Etiology likely d/t chronic disease due to ESRD  Current CBC reviewed-   Lab Results   Component Value Date    HGB 9.7 (L) 06/24/2024    HCT 29.8 (L) 06/24/2024     Monitor serial CBC and transfuse if patient becomes hemodynamically unstable, symptomatic or H/H drops below 7/21.

## 2024-06-24 NOTE — PLAN OF CARE
Problem: Hemodialysis  Goal: Safe, Effective Therapy Delivery  Outcome: Progressing  Goal: Effective Tissue Perfusion  Outcome: Progressing  Goal: Absence of Infection Signs and Symptoms  Outcome: Progressing     Problem: Chronic Kidney Disease  Goal: Electrolyte Balance  Outcome: Progressing  Goal: Fluid Balance  Outcome: Progressing

## 2024-06-24 NOTE — ASSESSMENT & PLAN NOTE
Patient has hyponatremia which is uncontrolled,We will aim to correct the sodium by 4-6mEq in 24 hours. We will monitor sodium Daily. The hyponatremia is due to Medications: SSRIs and renal insufficiency. We will obtain the following studies: Urine sodium, urine osmolality, serum osmolality. We will treat the hyponatremia with Hemodialysis and Removal of offending medications. The patient's sodium results have been reviewed and are listed below.  Recent Labs   Lab 06/24/24  0314   *     - patient with chronic hyponatremia, holding zoloft for now   - hyperglycemic, corrected Na 134

## 2024-06-24 NOTE — ASSESSMENT & PLAN NOTE
Patient has acute metabolic encephalopathy that likely secondary to UTI.Treatment for underlying cause is underway. Pt with recurrent admissions for encephalopathy with extensive work ups including EEG, CTH, MRI brain, and vitamin panels. Will monitor neuro status carefully, avoid narcotics and benzos that will exacerbate agitation, and use PRN anti-psychotics for controls of behavior for self harm.  -Initially hypoglycemic which improved with treatment. Na 129.  -UA concerning for infection.  -ucx with candida krusei, ID consulted for treatment recommendations:   - obtain CT abd/pelvis  -CXR with diffuse increased interstitial and parenchymal attenuation similar to prior study. No large region of confluent airspace consolidation or substantial volume of pleural fluid identified.   -CTH-negative  -Delirium precautions   - reports progressive decline in patient over the last few months. Initiated NH paperwork for patient. CM notified for assistance

## 2024-06-24 NOTE — NURSING
1:1  dialysis started .3.5hours dialysis  started at 0849horus .[facility] . Ao*2 ( Self  and on and off with place ) , very teary unable to express her needs .Patient has no  dressing over HD catheter. Patient's VSS . Patient complains of  chest pain , received Okay to start HD by NP ANDREA Wilson at the bedside  , PRN meds Tylenol given . Patient  was itchy, scratched out peripheral IV , Tab benadryl given . Tolerating  treatment at present . Report  received  from DEANGELO Tanner.

## 2024-06-24 NOTE — NURSING
3.5 hours  dialysis completed . 2 L UF  removed . Dressing on catheters in intact , clean and  dry . Left  IJ catheters Flushed, heparin locked and wrapped  with tap and gauze . Report  given to DEANGELO Tanner.VSS

## 2024-06-24 NOTE — SUBJECTIVE & OBJECTIVE
Interval History: Patient seen and assessed at bedside with  present. Afebrile, VSS. Received HD treatment this am per nephro. During treatment, patient reported to be confused, initially refusing dialysis, AAOx2 (self, place), unable to express needs. Assessed by me following HD treatment and patient noted to be AAOx4, no complaints at this time. ID consulted for symptomatic candiduria: recommending deferring tx for now and obtaining CT abd/pelvis for abdominal tenderness.     Review of Systems   Unable to perform ROS: Mental status change   Respiratory:  Negative for shortness of breath.    Cardiovascular:  Negative for chest pain.   Gastrointestinal:  Negative for nausea.   Genitourinary:  Positive for dysuria. Negative for frequency.     Objective:     Vital Signs (Most Recent):  Temp: 98.5 °F (36.9 °C) (06/24/24 0733)  Pulse: 78 (06/24/24 1219)  Resp: 18 (06/24/24 0930)  BP: (!) 141/79 (06/24/24 1219)  SpO2: 98 % (06/24/24 1219) Vital Signs (24h Range):  Temp:  [97.3 °F (36.3 °C)-98.5 °F (36.9 °C)] 98.5 °F (36.9 °C)  Pulse:  [74-87] 78  Resp:  [14-18] 18  SpO2:  [93 %-100 %] 98 %  BP: (123-161)/(60-96) 141/79     Weight: 74.2 kg (163 lb 9.3 oz)  Body mass index is 49.92 kg/m².    Intake/Output Summary (Last 24 hours) at 6/24/2024 1519  Last data filed at 6/24/2024 1219  Gross per 24 hour   Intake 300 ml   Output 2950 ml   Net -2650 ml         Physical Exam  Vitals and nursing note reviewed.   Constitutional:       Appearance: She is not toxic-appearing or diaphoretic.      Comments: Chronically ill appearing   HENT:      Head: Normocephalic and atraumatic.      Nose: Nose normal.      Mouth/Throat:      Mouth: Mucous membranes are moist.   Eyes:      Pupils: Pupils are equal, round, and reactive to light.   Cardiovascular:      Rate and Rhythm: Normal rate and regular rhythm.      Pulses: Normal pulses.   Pulmonary:      Effort: Pulmonary effort is normal. No respiratory distress.      Breath sounds: No  wheezing, rhonchi or rales.   Abdominal:      General: Bowel sounds are normal. There is no distension.      Palpations: Abdomen is soft.      Tenderness: There is abdominal tenderness (generalized, more prominent in epigastric/RLQ). There is no guarding.   Musculoskeletal:         General: Normal range of motion.      Cervical back: Normal range of motion.      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      Left Lower Extremity: Left leg is amputated above knee.   Skin:     General: Skin is warm and dry.      Capillary Refill: Capillary refill takes less than 2 seconds.   Neurological:      General: No focal deficit present.      Mental Status: She is alert.      Cranial Nerves: No facial asymmetry.      Motor: No tremor or seizure activity.      Comments: Patient oriented to person, place, year, president. Participatory in examination.    Psychiatric:         Behavior: Behavior is cooperative.             Significant Labs: All pertinent labs within the past 24 hours have been reviewed.    Significant Imaging: I have reviewed all pertinent imaging results/findings within the past 24 hours.

## 2024-06-24 NOTE — PROGRESS NOTES
[facility] [address] [place] [address] Medicine  Progress Note    Patient Name: Suyapa Connelly  MRN: [id number]  Patient Class: IP- Inpatient   Admission Date: 6/21/2024  Length of Stay: 2 days  Attending Physician: Frances Bee MD  Primary Care Provider: Feliciano Nguyen III, PA-C        Subjective:     Principal Problem:Acute encephalopathy        HPI:  Suyapa Connelly is a 58 y.o. female with a PMHx of HTN, GERD, HLD, PVD, ESRD on HD MFW, b/l AKAs, CAD s/p CABG, afib on eliquis, depression, and anxiety who presents to the ED for evaluation of confusion. HPI and ROS limited secondary to patient's mental status and poor participation. Per patient's  she has been more sleepy and less interactive over the past several days. He reports that her urine has been foul-smelling and more cloudy. He states she was also peeing more frequently in complaining of burning with urination and some occasional suprapubic pain. Denies any fevers or chills. The patient denies CP, SOB, abdominal pain, or dysuria. Per  patient was dialyzed today a full dialysis and they came straight here after.     In the ED, VSS, afebrile. CBC with stable leukopenia and anemia. Na 129, Chloride 94, Bicarb 22, Glucose 50. POC glucose 150. Cr 2.7 (consistent with ESRD). Albumin 3.2. Tbili 1.1. Mag 2.1. UA with 3+ leukocytosis, 74 WBCs, but no bacteria. CTH with no acute finding or detrimental change when compared with 06/11/2024, allowing for motion and artifact limitations. The patient received 1g IV Rocephin.    Overview/Hospital Course:  Suyapa Connelly is a 58 y.o. F who was admitted to  for further evaluation of acute encephalopathy. Most likely 2/2 UTI, other workup negative. AAOx3 now. Initiated rocephin for infectious appearing UA. Following culture - grew Candida krusei, stopped rocephin. ID consulted for recommendations. Recommend deferring treatment, obtain CT abd/pelvis. Nephrology following for HD management: HD performed  6/24. CM assisting with discharge planning.       Interval History: Patient seen and assessed at bedside with  present. Afebrile, VSS. Received HD treatment this am per nephro. During treatment, patient reported to be confused, initially refusing dialysis, AAOx2 (self, place), unable to express needs. Assessed by me following HD treatment and patient noted to be AAOx4, no complaints at this time. ID consulted for symptomatic candiduria: recommending deferring tx for now and obtaining CT abd/pelvis for abdominal tenderness.     Review of Systems   Unable to perform ROS: Mental status change   Respiratory:  Negative for shortness of breath.    Cardiovascular:  Negative for chest pain.   Gastrointestinal:  Negative for nausea.   Genitourinary:  Positive for dysuria. Negative for frequency.     Objective:     Vital Signs (Most Recent):  Temp: 98.5 °F (36.9 °C) (06/24/24 0733)  Pulse: 78 (06/24/24 1219)  Resp: 18 (06/24/24 0930)  BP: (!) 141/79 (06/24/24 1219)  SpO2: 98 % (06/24/24 1219) Vital Signs (24h Range):  Temp:  [97.3 °F (36.3 °C)-98.5 °F (36.9 °C)] 98.5 °F (36.9 °C)  Pulse:  [74-87] 78  Resp:  [14-18] 18  SpO2:  [93 %-100 %] 98 %  BP: (123-161)/(60-96) 141/79     Weight: 74.2 kg (163 lb 9.3 oz)  Body mass index is 49.92 kg/m².    Intake/Output Summary (Last 24 hours) at 6/24/2024 1519  Last data filed at 6/24/2024 1219  Gross per 24 hour   Intake 300 ml   Output 2950 ml   Net -2650 ml         Physical Exam  Vitals and nursing note reviewed.   Constitutional:       Appearance: She is not toxic-appearing or diaphoretic.      Comments: Chronically ill appearing   HENT:      Head: Normocephalic and atraumatic.      Nose: Nose normal.      Mouth/Throat:      Mouth: Mucous membranes are moist.   Eyes:      Pupils: Pupils are equal, round, and reactive to light.   Cardiovascular:      Rate and Rhythm: Normal rate and regular rhythm.      Pulses: Normal pulses.   Pulmonary:      Effort: Pulmonary effort is  normal. No respiratory distress.      Breath sounds: No wheezing, rhonchi or rales.   Abdominal:      General: Bowel sounds are normal. There is no distension.      Palpations: Abdomen is soft.      Tenderness: There is abdominal tenderness (generalized, more prominent in epigastric/RLQ). There is no guarding.   Musculoskeletal:         General: Normal range of motion.      Cervical back: Normal range of motion.      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      Left Lower Extremity: Left leg is amputated above knee.   Skin:     General: Skin is warm and dry.      Capillary Refill: Capillary refill takes less than 2 seconds.   Neurological:      General: No focal deficit present.      Mental Status: She is alert.      Cranial Nerves: No facial asymmetry.      Motor: No tremor or seizure activity.      Comments: Patient oriented to person, place, year, president. Participatory in examination.    Psychiatric:         Behavior: Behavior is cooperative.             Significant Labs: All pertinent labs within the past 24 hours have been reviewed.    Significant Imaging: I have reviewed all pertinent imaging results/findings within the past 24 hours.    Assessment/Plan:      * Acute encephalopathy  Patient has acute metabolic encephalopathy that likely secondary to UTI.Treatment for underlying cause is underway. Pt with recurrent admissions for encephalopathy with extensive work ups including EEG, CTH, MRI brain, and vitamin panels. Will monitor neuro status carefully, avoid narcotics and benzos that will exacerbate agitation, and use PRN anti-psychotics for controls of behavior for self harm.  -Initially hypoglycemic which improved with treatment. Na 129.  -UA concerning for infection.  -ucx with candida krusei, ID consulted for treatment recommendations:   - obtain CT abd/pelvis  -CXR with diffuse increased interstitial and parenchymal attenuation similar to prior study. No large region of confluent  airspace consolidation or substantial volume of pleural fluid identified.   -CTH-negative  -Delirium precautions   - reports progressive decline in patient over the last few months. Initiated NH paperwork for patient. CM notified for assistance     Severe obesity (BMI >= 40)  Body mass index is 49.92 kg/m². Morbid obesity complicates all aspects of disease management from diagnostic modalities to treatment.     ESRD (end stage renal disease)  Chronic kidney disease-mineral and bone disorder   MWF schedule, last dialyzed 6/21  Residual renal function?- Yes    - Nephrology consulted  - Continue chronic hemodialysis  - Monitor daily electrolytes and defer dialysis orders to nephrology  - Renally dose medications  - Renal diet  - Continue phosphorus binders  - Daily weights/strict I&Os  - 1.5L FR  - received HD 6/24    Debility  Patient with Chronic debility due to other reduced mobility. Latest AMPAC and GEMS scores have not been reviewed. Plan includes progressive mobility protocol initated and fall precautions in place.  - family initiated NH paperwork  - CM notified    S/P AKA (above knee amputation) bilateral  History noted.  -Fall precautions.    Chronic combined systolic and diastolic heart failure  Patient is identified as having Combined Systolic and Diastolic heart failure that is Chronic. CHF is currently controlled. Latest ECHO performed and demonstrates- Results for orders placed during the hospital encounter of 05/07/24    Echo    Interpretation Summary    Left Ventricle: The left ventricle is mildly dilated. Ventricular mass is normal. Normal wall thickness. Severe global hypokinesis present. There is severely reduced systolic function. Ejection fraction by visual approximation is 15%. There is diastolic dysfunction.    Right Ventricle: Severe right ventricular enlargement. Wall thickness is normal. Right ventricle wall motion has global hypokinesis. Systolic function is mildly reduced.    Left  "Atrium: Left atrium is severely dilated. There is an atrial septal aneurysm.    Right Atrium: Right atrium is dilated.    Aortic Valve: There is mild aortic regurgitation.    Mitral Valve: There is mild regurgitation.    Tricuspid Valve: There is annular dilation present. There is normal leaflet mobility. There is severe functional regurgitation.    Pulmonary Artery: The estimated pulmonary artery systolic pressure is at least 24 mmHg. PASP is likely under-estimated in the setting of severe tricuspid regurgitation.    IVC/SVC: Central venous pressure of at least 8 mmHg.    No vegetation seen.  . Continue Beta Blocker and monitor clinical status closely. Monitor on telemetry. Patient is off CHF pathway.  Monitor strict Is&Os and daily weights.  Place on fluid restriction of 1.5 L. Continue to stress to patient importance of self efficacy and  on diet for CHF. Last BNP reviewed- and noted below No results for input(s): "BNP", "BNPTRIAGEBLO" in the last 168 hours..  -Volume management with HD.      Hyponatremia  Patient has hyponatremia which is uncontrolled,We will aim to correct the sodium by 4-6mEq in 24 hours. We will monitor sodium Daily. The hyponatremia is due to Medications: SSRIs and renal insufficiency. We will obtain the following studies: Urine sodium, urine osmolality, serum osmolality. We will treat the hyponatremia with Hemodialysis and Removal of offending medications. The patient's sodium results have been reviewed and are listed below.  Recent Labs   Lab 06/24/24  0314   *     - patient with chronic hyponatremia, holding zoloft for now   - hyperglycemic, corrected Na 134    Paroxysmal atrial fibrillation  Patient with Paroxysmal (<7 days) atrial fibrillation which is controlled currently with Beta Blocker. Patient is currently in sinus rhythm.[id number] Score: 3. Anticoagulation indicated. Anticoagulation done with eliquis .    Anemia due to chronic kidney disease, on chronic " dialysis  Patient's anemia is currently controlled. Has not received any PRBCs to date. Etiology likely d/t chronic disease due to ESRD  Current CBC reviewed-   Lab Results   Component Value Date    HGB 9.7 (L) 06/24/2024    HCT 29.8 (L) 06/24/2024     Monitor serial CBC and transfuse if patient becomes hemodynamically unstable, symptomatic or H/H drops below 7/21.    CAD (coronary artery disease)  Patient with known CAD s/p CABG, which is controlled Will continue Plavix and Statin and monitor for S/Sx of angina/ACS. Continue to monitor on telemetry.     Type 2 diabetes mellitus with hyperglycemia, with long-term current use of insulin  Patient's FSGs are not controlled on current hypoglycemics due to hypoglycemia.   Last A1c reviewed-   Lab Results   Component Value Date    HGBA1C 8.0 (H) 04/24/2024     Most recent fingerstick glucose reviewed-   Recent Labs   Lab 06/23/24  1620 06/23/24  2139   [id number] 262* 257*       Current correctional scale  Low  Maintain anti-hyperglycemic dose as follows-   Antihyperglycemics (From admission, onward)      Start     Stop Route Frequency Ordered    06/24/24 1645  insulin aspart U-100 pen 3 Units         -- SubQ 3 times daily with meals 06/24/24 1335    06/22/24 0032  insulin aspart U-100 pen 0-5 Units         -- SubQ Before meals & nightly PRN 06/21/24 2332        -Pt initially hypoglycemic which improved with D10 bolus.   -Accuchecks AC/HS    Acute cystitis without hematuria  -Pt denies any abd pain, dysuria, urinary frequency, or fever/chills. Per  patient's urine has been foul smelling and cloudy. Per  patient has also been complaining of suprapubic pain and dysuria at home.  -Afebrile, WBC 3.80 (chronic leukopenia).  -UA reviewed, follow urine cx.  -Pt received IV rocephin in the ED, continue for now.  -culture with candida krusei, ID consulted for recommendations   - obtain CT abd/pelvis    CAROLIN (generalized anxiety disorder)  -Chronic,  uncontrolled.  -Hold zoloft for now pending hyponatremia work up.   -Can consider psychiatry consult.    Peripheral vascular disease  -Chronic issue.  -Continue plavix and statin.      VTE Risk Mitigation (From admission, onward)           Ordered     heparin (porcine) injection 1,000 Units  As needed (PRN)         06/24/24 1001     apixaban tablet 5 mg  2 times daily         06/21/24 2332     IP VTE HIGH RISK PATIENT  Once         06/21/24 2332     Reason for No Pharmacological VTE Prophylaxis  Once        Question:  Reasons:  Answer:  Already adequately anticoagulated on oral Anticoagulants    06/21/24 2332                    Discharge Planning   DEVAN: 6/25/2024     Code Status: Full Code   Is the patient medically ready for discharge?:     Reason for patient still in hospital (select all that apply): Patient trending condition, Treatment, Imaging, and Consult recommendations  Discharge Plan A: New Nursing Home placement - [facility] care facility                  Viry Griffin PA-C  Department of Hospital Medicine   [facility]

## 2024-06-24 NOTE — NURSING
Arrived in [facility] via stretcher with NO dressing on right chest wall permcath.      RAUL Monique NP notified.    Sterile dressing applied per protocol.   Do Not Resuscitate (DNR)

## 2024-06-24 NOTE — ASSESSMENT & PLAN NOTE
Patient with Paroxysmal (<7 days) atrial fibrillation which is controlled currently with Beta Blocker. Patient is currently in sinus rhythm.[id number] Score: 3. Anticoagulation indicated. Anticoagulation done with eliquis .

## 2024-06-24 NOTE — PLAN OF CARE
SW faxed referral to [facility], as it was never sent via careport over the weekend. EDUARD will continue to follow up.     Valery Grewal, JENELLE  [facility]  Ext. [phone]

## 2024-06-25 LAB
ALBUMIN SERPL BCP-MCNC: 3 G/DL (ref 3.5–5.2)
ALP SERPL-CCNC: 127 U/L (ref 55–135)
ALT SERPL W/O P-5'-P-CCNC: 10 U/L (ref 10–44)
ANION GAP SERPL CALC-SCNC: 11 MMOL/L (ref 8–16)
AST SERPL-CCNC: 17 U/L (ref 10–40)
BASOPHILS # BLD AUTO: 0.04 K/UL (ref 0–0.2)
BASOPHILS NFR BLD: 1.1 % (ref 0–1.9)
BILIRUB SERPL-MCNC: 0.8 MG/DL (ref 0.1–1)
BUN SERPL-MCNC: 32 MG/DL (ref 6–20)
CALCIUM SERPL-MCNC: 9.5 MG/DL (ref 8.7–10.5)
CHLORIDE SERPL-SCNC: 100 MMOL/L (ref 95–110)
CO2 SERPL-SCNC: 22 MMOL/L (ref 23–29)
CREAT SERPL-MCNC: 3.5 MG/DL (ref 0.5–1.4)
DIFFERENTIAL METHOD BLD: ABNORMAL
EOSINOPHIL # BLD AUTO: 0.1 K/UL (ref 0–0.5)
EOSINOPHIL NFR BLD: 2.4 % (ref 0–8)
ERYTHROCYTE [DISTWIDTH] IN BLOOD BY AUTOMATED COUNT: 18.5 % (ref 11.5–14.5)
EST. GFR  (NO RACE VARIABLE): 14.6 ML/MIN/1.73 M^2
GLUCOSE SERPL-MCNC: 176 MG/DL (ref 70–110)
HCT VFR BLD AUTO: 29.2 % (ref 37–48.5)
HGB BLD-MCNC: 9.6 G/DL (ref 12–16)
IMM GRANULOCYTES # BLD AUTO: 0.01 K/UL (ref 0–0.04)
IMM GRANULOCYTES NFR BLD AUTO: 0.3 % (ref 0–0.5)
LYMPHOCYTES # BLD AUTO: 0.7 K/UL (ref 1–4.8)
LYMPHOCYTES NFR BLD: 17.9 % (ref 18–48)
MAGNESIUM SERPL-MCNC: 2.2 MG/DL (ref 1.6–2.6)
MCH RBC QN AUTO: 32.9 PG (ref 27–31)
MCHC RBC AUTO-ENTMCNC: 32.9 G/DL (ref 32–36)
MCV RBC AUTO: 100 FL (ref 82–98)
MONOCYTES # BLD AUTO: 0.6 K/UL (ref 0.3–1)
MONOCYTES NFR BLD: 15 % (ref 4–15)
NEUTROPHILS # BLD AUTO: 2.4 K/UL (ref 1.8–7.7)
NEUTROPHILS NFR BLD: 63.3 % (ref 38–73)
NRBC BLD-RTO: 0 /100 WBC
PHOSPHATE SERPL-MCNC: 3.4 MG/DL (ref 2.7–4.5)
PLATELET # BLD AUTO: 169 K/UL (ref 150–450)
PMV BLD AUTO: 10.7 FL (ref 9.2–12.9)
POCT GLUCOSE: 198 MG/DL (ref 70–110)
POCT GLUCOSE: 207 MG/DL (ref 70–110)
POCT GLUCOSE: 233 MG/DL (ref 70–110)
POTASSIUM SERPL-SCNC: 4.3 MMOL/L (ref 3.5–5.1)
PROT SERPL-MCNC: 7.9 G/DL (ref 6–8.4)
RBC # BLD AUTO: 2.92 M/UL (ref 4–5.4)
SODIUM SERPL-SCNC: 133 MMOL/L (ref 136–145)
WBC # BLD AUTO: 3.79 K/UL (ref 3.9–12.7)

## 2024-06-25 PROCEDURE — 99233 SBSQ HOSP IP/OBS HIGH 50: CPT | Mod: HCNC,,, | Performed by: INTERNAL MEDICINE

## 2024-06-25 PROCEDURE — 36415 COLL VENOUS BLD VENIPUNCTURE: CPT | Mod: HCNC

## 2024-06-25 PROCEDURE — A9698 NON-RAD CONTRAST MATERIALNOC: HCPCS | Mod: HCNC

## 2024-06-25 PROCEDURE — 80053 COMPREHEN METABOLIC PANEL: CPT | Mod: HCNC

## 2024-06-25 PROCEDURE — 25000003 PHARM REV CODE 250: Mod: HCNC | Performed by: NURSE PRACTITIONER

## 2024-06-25 PROCEDURE — 85025 COMPLETE CBC W/AUTO DIFF WBC: CPT | Mod: HCNC

## 2024-06-25 PROCEDURE — 11000001 HC ACUTE MED/SURG PRIVATE ROOM: Mod: HCNC

## 2024-06-25 PROCEDURE — 99232 SBSQ HOSP IP/OBS MODERATE 35: CPT | Mod: HCNC,,, | Performed by: NURSE PRACTITIONER

## 2024-06-25 PROCEDURE — 83735 ASSAY OF MAGNESIUM: CPT | Mod: HCNC

## 2024-06-25 PROCEDURE — 25000003 PHARM REV CODE 250: Mod: HCNC

## 2024-06-25 PROCEDURE — 84100 ASSAY OF PHOSPHORUS: CPT | Mod: HCNC

## 2024-06-25 PROCEDURE — 63600175 PHARM REV CODE 636 W HCPCS: Mod: HCNC

## 2024-06-25 PROCEDURE — 25500020 PHARM REV CODE 255: Mod: HCNC

## 2024-06-25 RX ORDER — GABAPENTIN 100 MG/1
100 CAPSULE ORAL 2 TIMES DAILY
Status: ON HOLD | COMMUNITY
Start: 2024-01-12 | End: 2024-07-04 | Stop reason: HOSPADM

## 2024-06-25 RX ORDER — FUROSEMIDE 40 MG/1
40 TABLET ORAL DAILY
Status: DISCONTINUED | OUTPATIENT
Start: 2024-06-26 | End: 2024-07-05 | Stop reason: HOSPADM

## 2024-06-25 RX ORDER — FUROSEMIDE 40 MG/1
40 TABLET ORAL DAILY
Status: ON HOLD | COMMUNITY
Start: 2024-01-12 | End: 2024-07-04

## 2024-06-25 RX ORDER — POLYETHYLENE GLYCOL 3350 17 G/17G
17 POWDER, FOR SOLUTION ORAL DAILY
Status: DISCONTINUED | OUTPATIENT
Start: 2024-06-26 | End: 2024-06-26

## 2024-06-25 RX ORDER — FUROSEMIDE 10 MG/ML
40 INJECTION INTRAMUSCULAR; INTRAVENOUS ONCE
Status: DISCONTINUED | OUTPATIENT
Start: 2024-06-25 | End: 2024-06-25

## 2024-06-25 RX ORDER — SODIUM CHLORIDE 9 MG/ML
INJECTION, SOLUTION INTRAVENOUS ONCE
Status: COMPLETED | OUTPATIENT
Start: 2024-06-26 | End: 2024-06-28

## 2024-06-25 RX ORDER — GABAPENTIN 100 MG/1
100 CAPSULE ORAL 2 TIMES DAILY
Status: DISCONTINUED | OUTPATIENT
Start: 2024-06-25 | End: 2024-07-03

## 2024-06-25 RX ORDER — FUROSEMIDE 10 MG/ML
120 INJECTION INTRAMUSCULAR; INTRAVENOUS ONCE
Status: COMPLETED | OUTPATIENT
Start: 2024-06-25 | End: 2024-06-25

## 2024-06-25 RX ADMIN — CALCIUM ACETATE 667 MG: 667 CAPSULE ORAL at 04:06

## 2024-06-25 RX ADMIN — FUROSEMIDE 120 MG: 10 INJECTION, SOLUTION INTRAVENOUS at 04:06

## 2024-06-25 RX ADMIN — CALCITRIOL 0.5 MCG: 0.5 CAPSULE, LIQUID FILLED ORAL at 09:06

## 2024-06-25 RX ADMIN — CALCIUM ACETATE 667 MG: 667 CAPSULE ORAL at 09:06

## 2024-06-25 RX ADMIN — INSULIN ASPART 1 UNITS: 100 INJECTION, SOLUTION INTRAVENOUS; SUBCUTANEOUS at 10:06

## 2024-06-25 RX ADMIN — ATORVASTATIN CALCIUM 80 MG: 40 TABLET, FILM COATED ORAL at 09:06

## 2024-06-25 RX ADMIN — GABAPENTIN 100 MG: 100 CAPSULE ORAL at 10:06

## 2024-06-25 RX ADMIN — CALCIUM ACETATE 667 MG: 667 CAPSULE ORAL at 12:06

## 2024-06-25 RX ADMIN — CARVEDILOL 6.25 MG: 6.25 TABLET, FILM COATED ORAL at 10:06

## 2024-06-25 RX ADMIN — CLOPIDOGREL BISULFATE 75 MG: 75 TABLET ORAL at 09:06

## 2024-06-25 RX ADMIN — APIXABAN 5 MG: 5 TABLET, FILM COATED ORAL at 10:06

## 2024-06-25 RX ADMIN — CARVEDILOL 6.25 MG: 6.25 TABLET, FILM COATED ORAL at 09:06

## 2024-06-25 RX ADMIN — APIXABAN 5 MG: 5 TABLET, FILM COATED ORAL at 09:06

## 2024-06-25 RX ADMIN — INSULIN ASPART 3 UNITS: 100 INJECTION, SOLUTION INTRAVENOUS; SUBCUTANEOUS at 04:06

## 2024-06-25 RX ADMIN — HYDRALAZINE HYDROCHLORIDE 50 MG: 50 TABLET ORAL at 10:06

## 2024-06-25 RX ADMIN — BARIUM SULFATE 450 ML: 20 SUSPENSION ORAL at 10:06

## 2024-06-25 RX ADMIN — HYDRALAZINE HYDROCHLORIDE 50 MG: 50 TABLET ORAL at 09:06

## 2024-06-25 RX ADMIN — FAMOTIDINE 20 MG: 20 TABLET ORAL at 09:06

## 2024-06-25 RX ADMIN — HYDROXYZINE HYDROCHLORIDE 25 MG: 25 TABLET, FILM COATED ORAL at 10:06

## 2024-06-25 NOTE — ASSESSMENT & PLAN NOTE
-Chronic, uncontrolled.  -Hold zoloft for now pending hyponatremia work up.   -Can consider psychiatry consult - consult placed

## 2024-06-25 NOTE — HPI
Intake history taken from chart as patient unable to provide but details reviewed and confirmed with :     Suyapa Connelly is a 58 y.o. female with a PMHx of HTN, GERD, HLD, PVD, ESRD on HD MFW, b/l AKAs, CAD s/p CABG, afib on eliquis, depression, and anxiety who presented to the ED 6/21 for evaluation of confusion. HPI and ROS limited secondary to patient's mental status and poor participation. Per patient's  she has been more sleepy and less interactive over the past several days prior to admit. He reported that her urine has been foul-smelling and more cloudy. He stated she was also peeing more frequently in complaining of burning with urination and some occasional suprapubic pain. Denied any fevers or chills. The patient denied CP, SOB, abdominal pain, or dysuria. Per  patient was dialyzed today a full dialysis and they came straight here after.      In the ED, VSS, afebrile. CBC with stable leukopenia and anemia. Na 129, Chloride 94, Bicarb 22, Glucose 50. POC glucose 150. Cr 2.7 (consistent with ESRD). Albumin 3.2. Tbili 1.1. Mag 2.1. UA with 3+ leukocytosis, 74 WBCs, but no bacteria. CTH with no acute finding or detrimental change when compared with 06/11/2024, allowing for motion and artifact limitations. The patient received 1g IV Rocephin.    Interval history:  Patient admitted and received 2 days of rocephin but has been off last 2 days.  Urine culture showed 10-49k CFU of C krusei but no bacterial growth.  ID consulted for candida uti.  Patient seen and is confused on exam.  Currently denies dysuria but admits to abd pain.  reports she uses a Purewick at home and though she is on HD, she still produces some urine daily. The patient denies any recent fever, chills, or sweats.

## 2024-06-25 NOTE — PROGRESS NOTES
[facility] [address] [place] [address] Medicine  Progress Note    Patient Name: Suyapa Connelly  MRN: [id number]  Patient Class: IP- Inpatient   Admission Date: 6/21/2024  Length of Stay: 3 days  Attending Physician: Frances Bee MD  Primary Care Provider: Feliciano Nguyen III, PA-C        Subjective:     Principal Problem:Acute encephalopathy        HPI:  Suyapa Connelly is a 58 y.o. female with a PMHx of HTN, GERD, HLD, PVD, ESRD on HD MFW, b/l AKAs, CAD s/p CABG, afib on eliquis, depression, and anxiety who presents to the ED for evaluation of confusion. HPI and ROS limited secondary to patient's mental status and poor participation. Per patient's  she has been more sleepy and less interactive over the past several days. He reports that her urine has been foul-smelling and more cloudy. He states she was also peeing more frequently in complaining of burning with urination and some occasional suprapubic pain. Denies any fevers or chills. The patient denies CP, SOB, abdominal pain, or dysuria. Per  patient was dialyzed today a full dialysis and they came straight here after.     In the ED, VSS, afebrile. CBC with stable leukopenia and anemia. Na 129, Chloride 94, Bicarb 22, Glucose 50. POC glucose 150. Cr 2.7 (consistent with ESRD). Albumin 3.2. Tbili 1.1. Mag 2.1. UA with 3+ leukocytosis, 74 WBCs, but no bacteria. CTH with no acute finding or detrimental change when compared with 06/11/2024, allowing for motion and artifact limitations. The patient received 1g IV Rocephin.    Overview/Hospital Course:  Suyapa Connelly is a 58 y.o. F who was admitted to  for further evaluation of acute encephalopathy. Most likely 2/2 UTI, other workup negative. AAOx3 now. Initiated rocephin for infectious appearing UA. Following culture - grew Candida krusei, stopped rocephin. ID consulted for recommendations. Recommend deferring treatment, obtain CT abd/pelvis. CT with findings of volume overload, otherwise no  acute intraabdominal abnormality. Given x1 120mg IV lasix. Nephrology following for HD management: HD performed 6/24. Psych consulted for recommendations. CM assisting with discharge planning.       Interval History: Patient seen and assessed at bedside. Afebrile, VSS. AAOx4 on assessment, but changes throughout day. CT a/p with IV contrast attempted last night, but patient became confused and ripped out IV. IV contrast deferred, CT a/p with findings of fluid overload, no acute intraabdominal abnormality. IV lasix ordered. Patient intermittently tearful on examination - complaints of generalized pain. Psych consulted, appreciate recommendations. CM assisting with discharge planning.       Review of Systems   Unable to perform ROS: Mental status change   Respiratory:  Negative for shortness of breath.    Cardiovascular:  Negative for chest pain.   Gastrointestinal:  Positive for abdominal pain. Negative for nausea.   Genitourinary:  Negative for frequency.   Musculoskeletal:  Positive for arthralgias and back pain.     Objective:     Vital Signs (Most Recent):  Temp: 97.8 °F (36.6 °C) (06/25/24 0750)  Pulse: 83 (06/25/24 0750)  Resp: 18 (06/25/24 0750)  BP: 139/69 (06/25/24 0750)  SpO2: 96 % (06/25/24 0750) Vital Signs (24h Range):  Temp:  [97.8 °F (36.6 °C)-97.9 °F (36.6 °C)] 97.8 °F (36.6 °C)  Pulse:  [67-87] 83  Resp:  [16-18] 18  SpO2:  [92 %-97 %] 96 %  BP: ()/(53-85) 139/69     Weight: 74.2 kg (163 lb 9.3 oz)  Body mass index is 49.92 kg/m².    Intake/Output Summary (Last 24 hours) at 6/25/2024 1518  Last data filed at 6/25/2024 0100  Gross per 24 hour   Intake 350.05 ml   Output 250 ml   Net 100.05 ml         Physical Exam  Vitals and nursing note reviewed.   Constitutional:       Appearance: She is not toxic-appearing or diaphoretic.      Comments: Chronically ill appearing   HENT:      Head: Normocephalic and atraumatic.      Nose: Nose normal.      Mouth/Throat:      Mouth: Mucous membranes are moist.  "  Eyes:      Pupils: Pupils are equal, round, and reactive to light.   Cardiovascular:      Rate and Rhythm: Normal rate and regular rhythm.      Pulses: Normal pulses.   Pulmonary:      Effort: Pulmonary effort is normal. No respiratory distress.      Breath sounds: No wheezing, rhonchi or rales.   Abdominal:      General: Bowel sounds are normal. There is no distension.      Palpations: Abdomen is soft.      Tenderness: There is abdominal tenderness (generalized, more prominent in umbilical). There is no guarding.   Musculoskeletal:         General: Normal range of motion.      Cervical back: Normal range of motion.      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      Left Lower Extremity: Left leg is amputated above knee.   Skin:     General: Skin is warm and dry.      Capillary Refill: Capillary refill takes less than 2 seconds.   Neurological:      General: No focal deficit present.      Mental Status: She is alert. Mental status is at baseline.      Cranial Nerves: No facial asymmetry.      Motor: No tremor or seizure activity.      Comments: Patient oriented to person, place, year, president. Minimally participatory with exam, only answers question with "yes" and "no". Intermittently tearful.    Psychiatric:         Mood and Affect: Affect is tearful.         Behavior: Behavior is cooperative.             Significant Labs: All pertinent labs within the past 24 hours have been reviewed.    Significant Imaging: I have reviewed all pertinent imaging results/findings within the past 24 hours.    Assessment/Plan:      * Acute encephalopathy  Patient has acute metabolic encephalopathy that likely secondary to UTI.Treatment for underlying cause is underway. Pt with recurrent admissions for encephalopathy with extensive work ups including EEG, CTH, MRI brain, and vitamin panels. Will monitor neuro status carefully, avoid narcotics and benzos that will exacerbate agitation, and use PRN anti-psychotics " for controls of behavior for self harm.  -Initially hypoglycemic which improved with treatment. Na 129.  -UA concerning for infection.  -ucx with candida krusei, ID consulted for treatment recommendations:   - obtain CT abd/pelvis - no acute intraabdominal abnormality   -CXR with diffuse increased interstitial and parenchymal attenuation similar to prior study. No large region of confluent airspace consolidation or substantial volume of pleural fluid identified.   -CTH-negative  -Delirium precautions   - reports progressive decline in patient over the last few months. Initiated NH paperwork for patient. CM notified for assistance     Severe obesity (BMI >= 40)  Body mass index is 49.92 kg/m². Morbid obesity complicates all aspects of disease management from diagnostic modalities to treatment.     ESRD (end stage renal disease)  Chronic kidney disease-mineral and bone disorder   MWF schedule, last dialyzed 6/24  Residual renal function?- Yes    - Nephrology consulted  - Continue chronic hemodialysis  - Monitor daily electrolytes and defer dialysis orders to nephrology  - Renally dose medications  - Renal diet  - Continue phosphorus binders  - Daily weights/strict I&Os  - 1.5L FR    Debility  Patient with Chronic debility due to other reduced mobility. Latest AMPAC and GEMS scores have not been reviewed. Plan includes progressive mobility protocol initated and fall precautions in place.  - family initiated NH paperwork  - CM notified    S/P AKA (above knee amputation) bilateral  History noted.  -Fall precautions.    Chronic combined systolic and diastolic heart failure  Patient is identified as having Combined Systolic and Diastolic heart failure that is Chronic. CHF is currently controlled. Latest ECHO performed and demonstrates- Results for orders placed during the hospital encounter of 05/07/24    Echo    Interpretation Summary    Left Ventricle: The left ventricle is mildly dilated. Ventricular mass is  "normal. Normal wall thickness. Severe global hypokinesis present. There is severely reduced systolic function. Ejection fraction by visual approximation is 15%. There is diastolic dysfunction.    Right Ventricle: Severe right ventricular enlargement. Wall thickness is normal. Right ventricle wall motion has global hypokinesis. Systolic function is mildly reduced.    Left Atrium: Left atrium is severely dilated. There is an atrial septal aneurysm.    Right Atrium: Right atrium is dilated.    Aortic Valve: There is mild aortic regurgitation.    Mitral Valve: There is mild regurgitation.    Tricuspid Valve: There is annular dilation present. There is normal leaflet mobility. There is severe functional regurgitation.    Pulmonary Artery: The estimated pulmonary artery systolic pressure is at least 24 mmHg. PASP is likely under-estimated in the setting of severe tricuspid regurgitation.    IVC/SVC: Central venous pressure of at least 8 mmHg.    No vegetation seen.  . Continue Beta Blocker, lasix and monitor clinical status closely. Monitor on telemetry. Patient is off CHF pathway.  Monitor strict Is&Os and daily weights.  Place on fluid restriction of 1.5 L. Continue to stress to patient importance of self efficacy and  on diet for CHF. Last BNP reviewed- and noted below No results for input(s): "BNP", "BNPTRIAGEBLO" in the last 168 hours..  -Volume management with HD.      Hyponatremia  Patient has hyponatremia which is uncontrolled,We will aim to correct the sodium by 4-6mEq in 24 hours. We will monitor sodium Daily. The hyponatremia is due to Medications: SSRIs and renal insufficiency. We will obtain the following studies: Urine sodium, urine osmolality, serum osmolality. We will treat the hyponatremia with Hemodialysis and Removal of offending medications. The patient's sodium results have been reviewed and are listed below.  Recent Labs   Lab 06/25/24  0904   *     - patient with chronic hyponatremia, " holding zoloft for now   - hyperglycemic, corrected Na 134    Paroxysmal atrial fibrillation  Patient with Paroxysmal (<7 days) atrial fibrillation which is controlled currently with Beta Blocker. Patient is currently in sinus rhythm.[id number] Score: 3. Anticoagulation indicated. Anticoagulation done with eliquis .    Anemia due to chronic kidney disease, on chronic dialysis  Patient's anemia is currently controlled. Has not received any PRBCs to date. Etiology likely d/t chronic disease due to ESRD  Current CBC reviewed-   Lab Results   Component Value Date    HGB 9.6 (L) 06/25/2024    HCT 29.2 (L) 06/25/2024     Monitor serial CBC and transfuse if patient becomes hemodynamically unstable, symptomatic or H/H drops below 7/21.    CAD (coronary artery disease)  Patient with known CAD s/p CABG, which is controlled Will continue Plavix and Statin and monitor for S/Sx of angina/ACS. Continue to monitor on telemetry.     Type 2 diabetes mellitus with hyperglycemia, with long-term current use of insulin  Patient's FSGs are not controlled on current hypoglycemics due to hypoglycemia.   Last A1c reviewed-   Lab Results   Component Value Date    HGBA1C 8.0 (H) 04/24/2024     Most recent fingerstick glucose reviewed-   Recent Labs   Lab 06/24/24  1725 06/24/24  2121 06/25/24  0814   POCTGLUCOSE 143* 190* 198*       Current correctional scale  Low  Maintain anti-hyperglycemic dose as follows-   Antihyperglycemics (From admission, onward)      Start     Stop Route Frequency Ordered    06/24/24 1645  insulin aspart U-100 pen 3 Units         -- SubQ 3 times daily with meals 06/24/24 1335    06/22/24 0032  insulin aspart U-100 pen 0-5 Units         -- SubQ Before meals & nightly PRN 06/21/24 2332        -Pt initially hypoglycemic which improved with D10 bolus.   -Accuchecks AC/HS    Acute cystitis without hematuria  -Pt denies any abd pain, dysuria, urinary frequency, or fever/chills. Per  patient's urine has been foul  smelling and cloudy. Per  patient has also been complaining of suprapubic pain and dysuria at home.  -Afebrile, WBC 3.80 (chronic leukopenia).  -UA reviewed, follow urine cx.  -Pt received IV rocephin in the ED, continue for now.  -culture with candida krusei, ID consulted for recommendations   - obtain CT abd/pelvis - no acute findings found for pain symptoms    CAROLIN (generalized anxiety disorder)  -Chronic, uncontrolled.  -Hold zoloft for now pending hyponatremia work up.   -Can consider psychiatry consult - consult placed    Peripheral vascular disease  -Chronic issue.  -Continue plavix and statin.      VTE Risk Mitigation (From admission, onward)           Ordered     heparin (porcine) injection 1,000 Units  As needed (PRN)         06/24/24 1001     apixaban tablet 5 mg  2 times daily         06/21/24 2332     IP VTE HIGH RISK PATIENT  Once         06/21/24 2332     Reason for No Pharmacological VTE Prophylaxis  Once        Question:  Reasons:  Answer:  Already adequately anticoagulated on oral Anticoagulants    06/21/24 2332                    Discharge Planning   DEVAN: 6/26/2024     Code Status: Full Code   Is the patient medically ready for discharge?:     Reason for patient still in hospital (select all that apply): Consult recommendations and Pending disposition  Discharge Plan A: New Nursing Home placement - long term care facility                  Viry Griffin PA-C  Department of Hospital Medicine   [facility]H

## 2024-06-25 NOTE — ASSESSMENT & PLAN NOTE
-Pt denies any abd pain, dysuria, urinary frequency, or fever/chills. Per  patient's urine has been foul smelling and cloudy. Per  patient has also been complaining of suprapubic pain and dysuria at home.  -Afebrile, WBC 3.80 (chronic leukopenia).  -UA reviewed, follow urine cx.  -Pt received IV rocephin in the ED, continue for now.  -culture with candida krusei, ID consulted for recommendations   - obtain CT abd/pelvis - no acute findings found for pain symptoms

## 2024-06-25 NOTE — ASSESSMENT & PLAN NOTE
Patient's FSGs are not controlled on current hypoglycemics due to hypoglycemia.   Last A1c reviewed-   Lab Results   Component Value Date    HGBA1C 8.0 (H) 04/24/2024     Most recent fingerstick glucose reviewed-   Recent Labs   Lab 06/24/24  1725 06/24/24  2121 06/25/24  0814   POCTGLUCOSE 143* 190* 198*       Current correctional scale  Low  Maintain anti-hyperglycemic dose as follows-   Antihyperglycemics (From admission, onward)    Start     Stop Route Frequency Ordered    06/24/24 1645  insulin aspart U-100 pen 3 Units         -- SubQ 3 times daily with meals 06/24/24 1335    06/22/24 0032  insulin aspart U-100 pen 0-5 Units         -- SubQ Before meals & nightly PRN 06/21/24 2332      -Pt initially hypoglycemic which improved with D10 bolus.   -Accuchecks AC/HS

## 2024-06-25 NOTE — SUBJECTIVE & OBJECTIVE
Interval History:   No acute events  Afebrile and WBC 3.79  CT A/P done  No complaints  The patient denies any recent fever, chills, or sweats.      Review of Systems   Constitutional:  Negative for activity change, chills, diaphoresis and fever.   Respiratory:  Negative for cough, shortness of breath and wheezing.    Cardiovascular:  Negative for chest pain.   Gastrointestinal:  Positive for abdominal pain. Negative for constipation, diarrhea, nausea and vomiting.   Genitourinary:  Negative for dysuria, frequency and urgency.   Neurological:  Negative for dizziness.   Hematological:  Does not bruise/bleed easily.     Objective:     Vital Signs (Most Recent):  Temp: 97.8 °F (36.6 °C) (06/25/24 0750)  Pulse: 83 (06/25/24 0750)  Resp: 18 (06/25/24 0750)  BP: 139/69 (06/25/24 0750)  SpO2: 96 % (06/25/24 0750) Vital Signs (24h Range):  Temp:  [97.8 °F (36.6 °C)-97.9 °F (36.6 °C)] 97.8 °F (36.6 °C)  Pulse:  [67-87] 83  Resp:  [16-18] 18  SpO2:  [92 %-98 %] 96 %  BP: ()/(53-85) 139/69     Weight: 74.2 kg (163 lb 9.3 oz)  Body mass index is 49.92 kg/m².    Estimated Creatinine Clearance: 13.7 mL/min (A) (based on SCr of 3.5 mg/dL (H)).     Physical Exam  Constitutional:       General: She is not in acute distress.     Appearance: She is well-developed. She is obese. She is ill-appearing. She is not toxic-appearing or diaphoretic.       HENT:      Head: Normocephalic and atraumatic.   Cardiovascular:      Rate and Rhythm: Normal rate and regular rhythm.      Heart sounds: Normal heart sounds. No murmur heard.     No friction rub. No gallop.   Pulmonary:      Effort: Pulmonary effort is normal. No respiratory distress.      Breath sounds: Normal breath sounds. No wheezing or rales.   Abdominal:      General: Bowel sounds are normal. There is no distension.      Palpations: Abdomen is soft. There is no mass.      Tenderness: There is abdominal tenderness (improved) in the periumbilical area and left upper quadrant.  There is no guarding or rebound.       Skin:     General: Skin is warm and dry.   Neurological:      Mental Status: She is alert.      Comments: Oriented now to person place and year but not president.   Psychiatric:         Behavior: Behavior normal.          Significant Labs: Blood Culture:   Recent Labs   Lab 02/04/24  1705 05/07/24  2104 06/12/24  0949 06/12/24  0954   LABBLOO No growth after 5 days.  No growth after 5 days. No growth after 5 days.  No growth after 5 days. No growth after 5 days. No growth after 5 days.     CBC:   Recent Labs   Lab 06/24/24  0314 06/25/24  0904   WBC 4.30 3.79*   HGB 9.7* 9.6*   HCT 29.8* 29.2*    169     CMP:   Recent Labs   Lab 06/24/24  0314 06/24/24  0942 06/24/24  1219 06/25/24  0904   *  --   --  133*   K 4.9  --   --  4.3   CL 96  --   --  100   CO2 24  --   --  22*   *  --   --  176*   BUN 54* 57* 20 32*   CREATININE 5.1*  --   --  3.5*   CALCIUM 9.3  --   --  9.5   PROT 7.8  --   --  7.9   ALBUMIN 3.1*  --   --  3.0*   BILITOT 0.7  --   --  0.8   ALKPHOS 162*  --   --  127   AST 18  --   --  17   ALT 9*  --   --  10   ANIONGAP 12  --   --  11     Urine Culture:   Recent Labs   Lab 05/07/24 2223 05/29/24  0118 06/21/24 2027   LABURIN ESCHERICHIA COLI  > 100,000 cfu/ml  * Multiple organisms isolated. None in predominance.  Repeat if  clinically necessary. ELIZABETH KRUSEI  10,000 - 49,999 cfu/ml  Treatment of asymptomatic candiduria is not recommended (except for   specific populations). Candida isolated in the urine typically   represents colonization. If an indwelling urinary catheter is present  it should be removed or replaced.  *     Urine Studies:   Recent Labs   Lab 06/21/24 2027   COLORU Yellow  Yellow   APPEARANCEUA Clear  Clear   PHUR 6.0  6.0   SPECGRAV 1.020  1.020   PROTEINUA 3+*  3+*   GLUCUA Negative  Negative   KETONESU Negative  Negative   BILIRUBINUA Negative  Negative   OCCULTUA 3+*  3+*   NITRITE Negative  Negative    LEUKOCYTESUR 3+*  3+*   RBCUA 14*   WBCUA 74*   BACTERIA None   SQUAMEPITHEL 1   HYALINECASTS 0     All pertinent labs within the past 24 hours have been reviewed.    Significant Imaging: I have reviewed all pertinent imaging results/findings within the past 24 hours.  CT Abdomen Pelvis Without Contrast [[id number]] Resulted: 06/25/24 1124   Order Status: Sent Updated: 06/25/24 1145   X-Ray Chest 1 View [[id number]] Resulted: 06/22/24 0054   Order Status: Completed Updated: 06/22/24 0056   Narrative:     EXAMINATION:  XR CHEST 1 VIEW    CLINICAL HISTORY:  infectious work up;    TECHNIQUE:  Single frontal view of the chest was performed.    COMPARISON:  06/11/2024    FINDINGS:  Cardiac monitoring leads overlie the chest.  There is a stably positioned left-sided central venous catheter in place.  Postoperative change of median sternotomy.  There is unchanged enlargement of the cardiomediastinal silhouette noting atherosclerosis of the thoracic aorta.  The lungs are symmetrically expanded with diffuse increased interstitial and parenchymal attenuation similar to prior study.  Findings can be seen with interstitial edema or infectious process.  Clinical correlation advised.  No large region of confluent airspace consolidation or substantial volume of pleural fluid identified.  Osseous structures are intact.   Impression:       Please see above.      Electronically signed by: Monik Lara MD  Date: 06/22/2024  Time: 00:54   CT Head Without Contrast [[id number]] Resulted: 06/21/24 2123   Order Status: Completed Updated: 06/21/24 2125   Narrative:     EXAMINATION:  CT HEAD WITHOUT CONTRAST    CLINICAL HISTORY:  Mental status change, unknown cause;    TECHNIQUE:  Low dose axial images were obtained through the head.  Coronal and sagittal reformations were also performed. Contrast was not administered.    COMPARISON:  06/11/2024.    FINDINGS:  Exam quality is limited by motion and streak artifact.    Generalized cerebral  volume loss and chronic ischemic changes as seen previously.    No evidence of acute territorial infarct, hemorrhage, mass effect, or midline shift.    Ventricles are prominent and stable when compared with the prior study.  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    No displaced calvarial fracture.    Visualized paranasal sinuses and mastoid air cells are essentially clear.   Impression:       No acute finding or detrimental change when compared with 06/11/2024, allowing for motion and artifact limitations.    Other findings discussed in the body of the report.      Electronically signed by: Lino Peter MD  Date: 06/21/2024  Time: 21:23      Imaging History     2024    Date Procedure Name Study Review Link PACS Link Status Accession Number Location   06/25/24 11:45 AM CT Abdomen Pelvis  Without Contrast Study Review  Images Exam Ended [id number] [facility]   06/22/24 05:39 AM Cardiac monitoring strips Study Review  Final     06/21/24 11:33 PM X-Ray Chest 1 View Study Review  Images Final [id number] [facility]   06/21/24 08:52 PM CT Head Without Contrast Study Review  Images Final [id number] [facility]   06/12/24 04:40 AM Cardiac monitoring strips Study Review  Final     06/11/24 11:59 PM X-Ray Hip 2 or 3 views Right with Pelvis when performed Study Review  Images Final [id number] [facility]   06/11/24 11:59 PM X-Ray Chest AP Portable Study Review  Images Final [id number] [facility]   06/11/24 10:25 PM CT Cervical Spine Without Contrast Study Review  Images Final [id number] [facility]   06/11/24 10:25 PM CT Maxillofacial Without Contrast Study Review  Images Final [id number] [facility]   06/11/24 10:25 PM CT Head Without Contrast Study Review  Images Final [id number] [facility]   06/03/24 10:50 AM Cardiac monitoring strips Study Review  Final     06/02/24 07:52 PM Cardiac monitoring strips Study Review  Final     06/01/24 10:37 PM Cardiac monitoring strips Study Review  Final     06/01/24  03:03 PM Cardiac monitoring strips Study Review  Final     05/30/24 02:49 PM Cardiac monitoring strips Study Review  Final     05/29/24 09:47 PM MRI Brain Without Contrast Study Review  Images Final [id number] [facility]   05/29/24 05:23 AM Cardiac monitoring strips Study Review  Final     05/28/24 09:29 PM CTA STROKE MULTI-PHASE Study Review  Images Final [id number] [facility]

## 2024-06-25 NOTE — ASSESSMENT & PLAN NOTE
Patient has hyponatremia which is uncontrolled,We will aim to correct the sodium by 4-6mEq in 24 hours. We will monitor sodium Daily. The hyponatremia is due to Medications: SSRIs and renal insufficiency. We will obtain the following studies: Urine sodium, urine osmolality, serum osmolality. We will treat the hyponatremia with Hemodialysis and Removal of offending medications. The patient's sodium results have been reviewed and are listed below.  Recent Labs   Lab 06/25/24  0904   *     - patient with chronic hyponatremia, holding zoloft for now   - hyperglycemic, corrected Na 134

## 2024-06-25 NOTE — PROGRESS NOTES
[facility]  Nephrology  Progress Note    Patient Name: Suyapa Connelly  MRN: [id number]  Admission Date: 6/21/2024  Hospital Length of Stay: 3 days  Attending Provider: Frances Bee MD   Primary Care Physician: Feliciano Nguyen III, PA-C  Principal Problem:Acute encephalopathy    Subjective:     Interval History:   HD completed yesterday with 2 L removed. No distress this AM. Pending CT.     Review of patient's allergies indicates:   Allergen Reactions    Ciprofloxacin Itching    Iodine      Kidney injury    Pcn [penicillins]      Rash; tolerated ceftriaxone on 1/13/20     Current Facility-Administered Medications   Medication Frequency    [START ON 6/26/2024] 0.9%  NaCl infusion Once    acetaminophen tablet 1,000 mg Q8H PRN    acetaminophen tablet 650 mg Q6H PRN    allopurinol split tablet 50 mg Every other day    apixaban tablet 5 mg BID    atorvastatin tablet 80 mg Daily    barium sulfate (READI-CAT) suspension 450 mL ONCE PRN    calcitRIOL capsule 0.5 mcg Daily    calcium acetate(phosphat bind) capsule 667 mg TID WM    carvediloL tablet 6.25 mg BID    clopidogreL tablet 75 mg Daily    dextrose 10% bolus 125 mL 125 mL PRN    dextrose 10% bolus 250 mL 250 mL PRN    diphenhydrAMINE capsule 25 mg Q6H PRN    diphenhydrAMINE injection 50 mg On Call Procedure    famotidine tablet 20 mg Daily    glucagon (human recombinant) injection 1 mg PRN    glucose chewable tablet 16 g PRN    glucose chewable tablet 24 g PRN    heparin (porcine) injection 1,000 Units PRN    hydrALAZINE tablet 50 mg TID    hydrocortisone 2.5 % cream BID PRN    hydrOXYzine HCL tablet 25 mg TID PRN    insulin aspart U-100 pen 0-5 Units QID (AC + HS) PRN    insulin aspart U-100 pen 3 Units TIDWM    melatonin tablet 6 mg Nightly PRN    naloxone 0.4 mg/mL injection 0.02 mg PRN    prochlorperazine injection Soln 5 mg Q6H PRN    sodium chloride 0.9% flush 10 mL Q12H PRN       Objective:     Vital Signs (Most Recent):  Temp: 97.8 °F (36.6  °C) (06/25/24 0750)  Pulse: 83 (06/25/24 0750)  Resp: 18 (06/25/24 0750)  BP: 139/69 (06/25/24 0750)  SpO2: 96 % (06/25/24 0750) Vital Signs (24h Range):  Temp:  [97.8 °F (36.6 °C)-97.9 °F (36.6 °C)] 97.8 °F (36.6 °C)  Pulse:  [67-87] 83  Resp:  [16-18] 18  SpO2:  [92 %-98 %] 96 %  BP: ()/(53-85) 139/69     Weight: 74.2 kg (163 lb 9.3 oz) (06/22/24 0400)  Body mass index is 49.92 kg/m².  Body surface area is 1.59 meters squared.    I/O last 3 completed shifts:  In: 650.1 [I.V.:350.1; Other:300]  Out: 3200 [Urine:550; Other:2650]     Physical Exam  Vitals and nursing note reviewed.   Constitutional:       Appearance: She is not toxic-appearing or diaphoretic.      Comments: Chronically ill appearing   HENT:      Head: Normocephalic and atraumatic.      Nose: Nose normal.      Mouth/Throat:      Mouth: Mucous membranes are moist.   Eyes:      General: No scleral icterus.  Cardiovascular:      Rate and Rhythm: Normal rate and regular rhythm.      Pulses: Normal pulses.   Pulmonary:      Effort: Pulmonary effort is normal. No respiratory distress.      Breath sounds: No wheezing, rhonchi or rales.   Abdominal:      General: Bowel sounds are normal. There is no distension.      Palpations: Abdomen is soft.      Tenderness: There is abdominal tenderness (RLQ/suprapubic). There is no guarding.   Musculoskeletal:         General: Normal range of motion.      Cervical back: Normal range of motion.      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      Left Lower Extremity: Left leg is amputated above knee.   Skin:     General: Skin is warm and dry.      Capillary Refill: Capillary refill takes less than 2 seconds.   Neurological:      General: No focal deficit present.      Mental Status: She is alert.      Cranial Nerves: No facial asymmetry.      Motor: No tremor or seizure activity.      Comments: AAOx3. Will nod yes or no, occasionally answer questions, and follows basic commands.   Psychiatric:          Mood and Affect: Affect is tearful.         Speech: She is noncommunicative.         Behavior: Behavior is cooperative.          Significant Labs:  CBC:   Recent Labs   Lab 06/25/24  0904   WBC 3.79*   RBC 2.92*   HGB 9.6*   HCT 29.2*      *   MCH 32.9*   MCHC 32.9     CMP:   Recent Labs   Lab 06/25/24  0904   *   CALCIUM 9.5   ALBUMIN 3.0*   PROT 7.9   *   K 4.3   CO2 22*      BUN 32*   CREATININE 3.5*   ALKPHOS 127   ALT 10   AST 17   BILITOT 0.8     All labs within the past 24 hours have been reviewed.     Assessment/Plan:     Neuro  * Acute encephalopathy  - defer TORRES to primary team     Renal/  ESRD (end stage renal disease)  58 y.o. Black or  Female ESRD-HD M-W-F presents to ED on 6/21/2024 with diagnosis of: Hypoglycemia [E16.2];Encephalopathy [G93.40];Chest pain [R07.9];Urinary tract infection with hematuria, site unspecified [N39.0, R31.9]   Nephrology consulted for inpatient ESRD-HD management    Outpatient HD Information:  -Dialysis modality: Hemodialysis  -[place] [facility] [place]  -Nephrologist: ?  -HD TX days: Monday/Wednesday/Friday, duration of treatment: 3 hrs   -Last HD TX prior to hospital admission: 6/21/24  -Dialysis access: dialysis catheter   -Residual urine: Minium  -EDW: 67 kg     Assessment:   - Dialysis for metabolic clearance and volume management will be provided tomorrow.  - Continue to monitor intake and output  - Please avoid gadolinium, fleets, phos-based laxatives, NSAIDs  - Dialysis thrice weekly unless more urgent indications arise. Will evaluate RRT requirements Daily.    Anemia of ESRD   Recent Labs   Lab 06/23/24  0517 06/24/24  0314 06/25/24  0904   WBC 3.70* 4.30 3.79*   HGB 10.4* 9.7* 9.6*   HCT 32.4* 29.8* 29.2*    160 169       Lab Results   Component Value Date    FESATURATED 35 06/22/2024    FERRITIN 757 (H) 06/22/2024       - Goal in ESRD is Hgb of 10-11. Near target.   - EPO can be administered and dosed  per his OP unit upon discharge.    Mineral Bone Disease in ESRD   Lab Results   Component Value Date    PTH 1,132 (H) 06/17/2024    CALCIUM 9.5 06/25/2024    ALBUMIN 3.0 (L) 06/25/2024    CAION 0.92 (L) 05/17/2023    PHOS 3.4 06/25/2024       - F/U PO4, Mg, Calcium. And albumin levels daily.   - Renal diet with protein intake goal 1.5 g/kg/d with 1 L fluid restriction   - Novasource with meals  - Continue home phos binder, phos 3.4        Thank you for your consult. I will follow-up with patient. Please contact us if you have any additional questions.    Sarah Faulkner, CHRIS, FNP-C  Nephrology  [facility]

## 2024-06-25 NOTE — SUBJECTIVE & OBJECTIVE
Interval History: Patient seen and assessed at bedside. Afebrile, VSS. AAOx4 on assessment, but changes throughout day. CT a/p with IV contrast attempted last night, but patient became confused and ripped out IV. IV contrast deferred, CT a/p with findings of fluid overload, no acute intraabdominal abnormality. IV lasix ordered. Patient intermittently tearful on examination - complaints of generalized pain. Psych consulted, appreciate recommendations. CM assisting with discharge planning.       Review of Systems   Unable to perform ROS: Mental status change   Respiratory:  Negative for shortness of breath.    Cardiovascular:  Negative for chest pain.   Gastrointestinal:  Positive for abdominal pain. Negative for nausea.   Genitourinary:  Negative for frequency.   Musculoskeletal:  Positive for arthralgias and back pain.     Objective:     Vital Signs (Most Recent):  Temp: 97.8 °F (36.6 °C) (06/25/24 0750)  Pulse: 83 (06/25/24 0750)  Resp: 18 (06/25/24 0750)  BP: 139/69 (06/25/24 0750)  SpO2: 96 % (06/25/24 0750) Vital Signs (24h Range):  Temp:  [97.8 °F (36.6 °C)-97.9 °F (36.6 °C)] 97.8 °F (36.6 °C)  Pulse:  [67-87] 83  Resp:  [16-18] 18  SpO2:  [92 %-97 %] 96 %  BP: ()/(53-85) 139/69     Weight: 74.2 kg (163 lb 9.3 oz)  Body mass index is 49.92 kg/m².    Intake/Output Summary (Last 24 hours) at 6/25/2024 1518  Last data filed at 6/25/2024 0100  Gross per 24 hour   Intake 350.05 ml   Output 250 ml   Net 100.05 ml         Physical Exam  Vitals and nursing note reviewed.   Constitutional:       Appearance: She is not toxic-appearing or diaphoretic.      Comments: Chronically ill appearing   HENT:      Head: Normocephalic and atraumatic.      Nose: Nose normal.      Mouth/Throat:      Mouth: Mucous membranes are moist.   Eyes:      Pupils: Pupils are equal, round, and reactive to light.   Cardiovascular:      Rate and Rhythm: Normal rate and regular rhythm.      Pulses: Normal pulses.   Pulmonary:      Effort:  "Pulmonary effort is normal. No respiratory distress.      Breath sounds: No wheezing, rhonchi or rales.   Abdominal:      General: Bowel sounds are normal. There is no distension.      Palpations: Abdomen is soft.      Tenderness: There is abdominal tenderness (generalized, more prominent in umbilical). There is no guarding.   Musculoskeletal:         General: Normal range of motion.      Cervical back: Normal range of motion.      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      Left Lower Extremity: Left leg is amputated above knee.   Skin:     General: Skin is warm and dry.      Capillary Refill: Capillary refill takes less than 2 seconds.   Neurological:      General: No focal deficit present.      Mental Status: She is alert. Mental status is at baseline.      Cranial Nerves: No facial asymmetry.      Motor: No tremor or seizure activity.      Comments: Patient oriented to person, place, year, president. Minimally participatory with exam, only answers question with "yes" and "no". Intermittently tearful.    Psychiatric:         Mood and Affect: Affect is tearful.         Behavior: Behavior is cooperative.             Significant Labs: All pertinent labs within the past 24 hours have been reviewed.    Significant Imaging: I have reviewed all pertinent imaging results/findings within the past 24 hours.  "

## 2024-06-25 NOTE — PLAN OF CARE
9:54 AM EDUARD spoke to Daysi with [facility] to follow up on referral. Still pending. SW will continue to follow up.     Valery Grewal, JENELLE  [facility]  Ext. [phone]

## 2024-06-25 NOTE — ASSESSMENT & PLAN NOTE
Chronic kidney disease-mineral and bone disorder   MWF schedule, last dialyzed 6/24  Residual renal function?- Yes    - Nephrology consulted  - Continue chronic hemodialysis  - Monitor daily electrolytes and defer dialysis orders to nephrology  - Renally dose medications  - Renal diet  - Continue phosphorus binders  - Daily weights/strict I&Os  - 1.5L FR

## 2024-06-25 NOTE — SUBJECTIVE & OBJECTIVE
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    Osteomyelitis     Osteomyelitis of left foot 04/30/2021    Ulcer of left foot     Vaginal delivery     x1       Past Surgical History:   Procedure Laterality Date    ABOVE-KNEE AMPUTATION Left 5/18/2021    Procedure: AMPUTATION, ABOVE KNEE;  Surgeon: Teddy Huber MD;  Location: [facility] OR [address] [place] [address];  Service: Vascular;  Laterality: Left;    ABOVE-KNEE AMPUTATION Right 3/18/2022    Procedure: AMPUTATION, ABOVE KNEE;  Surgeon: DAYNE Florez II, MD;  Location: [facility] OR [address] [place] [address];  Service: Vascular;  Laterality: Right;    Angiogram - Right Extremity Right 7/9/15    angiogram-left leg  10/6/15    ANGIOGRAPHY OF LOWER EXTREMITY Left 4/29/2021    Procedure: ANGIOGRAM, LOWER EXTREMITY;  Surgeon: Teddy Huber MD;  Location: [facility] OR [address] [place] [address];  Service: Vascular;  Laterality: Left;    BELOW KNEE AMPUTATION OF LOWER EXTREMITY Right 12/28/2021    Procedure: AMPUTATION, BELOW KNEE;  Surgeon: Kaitlyn Rojas MD;  Location: [facility] OR;  Service: General;  Laterality: Right;    CATHETERIZATION OF BOTH LEFT AND RIGHT HEART N/A 12/18/2019    Procedure: CATHETERIZATION, HEART, BOTH LEFT AND RIGHT;  Surgeon: Que Fernando III, MD;  Location: [facility] CATH LAB;  Service: Cardiology;  Laterality: N/A;    CORONARY ANGIOGRAPHY N/A 12/18/2019    Procedure: ANGIOGRAM, CORONARY ARTERY;  Surgeon: Que Fernando III, MD;  Location: [facility] CATH LAB;  Service: Cardiology;  Laterality: N/A;    CORONARY ANGIOGRAPHY INCLUDING BYPASS GRAFTS WITH CATHETERIZATION OF LEFT HEART N/A 7/28/2020    Procedure: ANGIOGRAM, CORONARY, INCLUDING BYPASS GRAFT, WITH LEFT HEART CATHETERIZATION, 9 am;  Surgeon: Rachel Easley MD;   Location: [facility] CATH LAB;  Service: Cardiology;  Laterality: N/A;    CORONARY ARTERY BYPASS GRAFT (CABG) N/A 1/14/2020    Procedure: CORONARY ARTERY BYPASS GRAFT (CABG) x 1     Off Pump;  Surgeon: Huang Altamirano MD;  Location: [facility] OR [address] [place] [address];  Service: Cardiovascular;  Laterality: N/A;    CREATION OF FEMORAL-TIBIAL ARTERY BYPASS Left 4/29/2021    Procedure: CREATION, BYPASS, ARTERIAL, FEMORAL TO ANTERIOR TIBIAL;  Surgeon: Teddy Huber MD;  Location: [facility] OR [address] [place] [address];  Service: Vascular;  Laterality: Left;    CREATION OF FEMOROPOPLITEAL ARTERIAL BYPASS USING GRAFT Left 8/18/2020    Procedure: CREATION, BYPASS, ARTERIAL, FEMORAL TO POPLITEAL, USING GRAFT, LEFT LOWER EXTREMITY;  Surgeon: Teddy Huber MD;  Location: [facility];  Service: Vascular;  Laterality: Left;  REQUEST 7:15 A.M. START----COVID NEGATIVE ON 8/17 1ST CASE STARTE PER LEANA ON 8/7/2020 @ 942AM-  RN PREOP 8/12/2020   T/S-----CLEARED BY CARDS-------PENDING INSURANCE    DEBRIDEMENT OF FOOT Left 9/8/2020    Procedure: DEBRIDEMENT, FOOT;  Surgeon: Rosio Mayes DPM;  Location: [facility];  Service: Podiatry;  Laterality: Left;  request neoxx .   RN Pre Op 9-4-2020, Covid negative on 9/5/20. C A    DEBRIDEMENT OF FOOT  3/4/2021    Procedure: DEBRIDEMENT, FOOT;  Surgeon: Teddy Huber MD;  Location: [facility] OR;  Service: Vascular;;    DEBRIDEMENT OF FOOT Left 3/9/2021    Procedure: DEBRIDEMENT, FOOT, bone biopsy;  Surgeon: Rosio Mayes DPM;  Location: [facility] OR;  Service: Podiatry;  Laterality: Left;  Request neoxx---COVID IN AM  REQUESTING NOON START  RN Phone Pre op.On Blood thinners Plavix and Eliquis.  Covid am of surgery. C A    DEBRIDEMENT OF FOOT Left 5/4/2021    Procedure: DEBRIDEMENT, FOOT;  Surgeon: Farooq Morley DPM;  Location: [address];  Service: Podiatry;  Laterality: Left;    INSERTION OF TUNNELED CENTRAL VENOUS HEMODIALYSIS CATHETER N/A 1/27/2020    Procedure: Insertion, Catheter, Central Venous,  Hemodialysis;  Surgeon: ESTEBAN Gomez III, MD;  Location: [facility] CATH LAB;  Service: Peripheral Vascular;  Laterality: N/A;    INSERTION OF TUNNELED CENTRAL VENOUS HEMODIALYSIS CATHETER  5/10/2023    Procedure: Insertion, Catheter, Central Venous, Hemodialysis;  Surgeon: Romulo Queen MD;  Location: [facility] CATH LAB;  Service: Cardiology;;    PERCUTANEOUS TRANSLUMINAL ANGIOPLASTY N/A 3/4/2021    Procedure: PTA (ANGIOPLASTY, PERCUTANEOUS, TRANSLUMINAL);  Surgeon: Teddy Huber MD;  Location: [facility] OR;  Service: Vascular;  Laterality: N/A;    REMOVAL OF ARTERIOVENOUS GRAFT Left 5/27/2021    Procedure: REMOVAL, GRAFT, LEFT LOWER EXTREMITY, WOUND EXPLORATION;  Surgeon: Teddy Huber MD;  Location: [facility] OR 2ND FLR;  Service: Vascular;  Laterality: Left;    REMOVAL OF NAIL OF DIGIT Left 3/9/2021    Procedure: REMOVAL, NAIL, DIGIT;  Surgeon: Rosio Mayes DPM;  Location: [facility] OR;  Service: Podiatry;  Laterality: Left;    RIGHT HEART CATHETERIZATION Right 5/10/2023    Procedure: INSERTION, CATHETER, RIGHT HEART;  Surgeon: Romulo Queen MD;  Location: [facility] CATH LAB;  Service: Cardiology;  Laterality: Right;    THROMBECTOMY Left 3/4/2021    Procedure: THROMBECTOMY, LEFT LOWER EXTREMITY BYPASS GRAFT, ANGIOGRAM, POSSIBLE INTERVENTION, POSSIBLE LEFT LOWER EXTREMITY BYPASS;  Surgeon: Teddy Huber MD;  Location: [facility] OR;  Service: Vascular;  Laterality: Left;    THROMBECTOMY Left 4/29/2021    Procedure: GRAFT THROMBECTOMY, LEFT LOWER EXTREMITY;  Surgeon: Teddy Huber MD;  Location: [facility] OR 2ND FLR;  Service: Vascular;  Laterality: Left;  14.5 min  1179.85 mGy  341.01 Gycm2  240 ml dye    THROMBECTOMY  10/22/2021    Procedure: THROMBECTOMY;  Surgeon: Saad Arenas MD;  Location: [facility] CATH LAB/EP;  Service: Cardiology;;       Review of patient's allergies indicates:   Allergen Reactions    Ciprofloxacin Itching    Iodine      Kidney injury    Pcn [penicillins]      Rash; tolerated ceftriaxone  "on 1/13/20       Medications:  Medications Prior to Admission   Medication Sig    acetaminophen (TYLENOL) 500 MG tablet Take 500 mg by mouth every 6 (six) hours as needed for Pain.    allopurinoL (ZYLOPRIM) 100 MG tablet Take 0.5 tablets (50 mg total) by mouth every other day.    apixaban (ELIQUIS) 5 mg Tab Take 1 tablet (5 mg total) by mouth 2 (two) times daily.    atorvastatin (LIPITOR) 80 MG tablet Take 1 tablet (80 mg total) by mouth once daily.    blood sugar diagnostic Strp Use to check blood glucose 2 (two) times a day.    calcitRIOL (ROCALTROL) 0.5 MCG Cap Take 1 capsule (0.5 mcg total) by mouth once daily.    calcium acetate,phosphat bind, (PHOSLO) 667 mg capsule Take 1 capsule (667 mg total) by mouth 3 (three) times daily with meals.    carvediloL (COREG) 6.25 MG tablet Take 1 tablet (6.25 mg total) by mouth 2 (two) times daily.    clopidogreL (PLAVIX) 75 mg tablet Take 1 tablet (75 mg total) by mouth once daily.    famotidine (PEPCID) 20 MG tablet Take 1 tablet (20 mg total) by mouth 2 (two) times daily.    hydrALAZINE (APRESOLINE) 50 MG tablet Take 1 tablet (50 mg total) by mouth 3 (three) times daily.    insulin aspart, niacinamide, (FIASP FLEXTOUCH U-100 INSULIN) 100 unit/mL (3 mL) InPn Inject 3 Units into the skin 3 (three) times daily with meals.    lancets Misc To check BG 3 times daily, to use with insurance preferred meter    multivitamin (ONE DAILY MULTIVITAMIN) per tablet Take 1 tablet by mouth once daily.    pen needle, diabetic 32 gauge x 5/32" Ndle 1 Units by Misc.(Non-Drug; Combo Route) route before meals as needed (SSI).    sertraline (ZOLOFT) 100 MG tablet Take 1 tablet (100 mg total) by mouth once daily.    sodium bicarbonate 650 MG tablet TAKE 1 TABLET(650 MG) BY MOUTH THREE TIMES DAILY    traZODone (DESYREL) 50 MG tablet Take 0.5 tablets (25 mg total) by mouth every evening.    wound dressings (TRIAD WOUND DRESSING) Pste Apply 2 g topically once daily.     Antibiotics (From admission, " onward)      None          Antifungals (From admission, onward)      None          Antivirals (From admission, onward)      None             Immunization History   Administered Date(s) Administered    COVID-19, MRNA, LN-S, PF (Pfizer) (Purple Cap) 03/09/2021, 03/30/2021    Hepatitis B, Adult 06/02/2023, 07/04/2023, 08/04/2023    PPD Test 05/14/2024       Family History       Problem Relation (Age of Onset)    Diabetes Mother, Father, Paternal Grandmother    Heart disease Maternal Grandmother    No Known Problems Maternal Grandfather, Paternal Grandfather          Social History     Socioeconomic History    Marital status:    Occupational History    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      Social Determinants of Health     Financial Resource Strain: Patient Declined (6/22/2024)    Overall Financial Resource Strain (CARDIA)     Difficulty of Paying Living Expenses: Patient declined   Food Insecurity: Patient Declined (6/22/2024)    Hunger Vital Sign     Worried About Running Out of Food in the Last Year: Patient declined     Ran Out of Food in the Last Year: Patient declined   Transportation Needs: No Transportation Needs (6/22/2024)    TRANSPORTATION NEEDS     Transportation : No   Physical Activity: Inactive (6/22/2024)    Exercise Vital Sign     Days of Exercise per Week: 0 days     Minutes of Exercise per Session: 0 min   Stress: Patient Declined (6/22/2024)    [employer] [place] [employer] - Occupational Stress Questionnaire     Feeling of Stress : Patient declined   Housing Stability: Patient Declined (6/22/2024)    Housing Stability Vital Sign     Unable to Pay for Housing in the Last Year: Patient declined     Homeless in the  Last Year: Patient declined     Review of Systems   Constitutional:  Negative for appetite change, chills, diaphoresis, fatigue, fever and unexpected weight change.   HENT:  Negative for congestion, ear pain, hearing loss, sore throat and tinnitus.    Eyes:  Negative for pain, redness and visual disturbance.   Respiratory:  Negative for cough, chest tightness, shortness of breath and wheezing.    Cardiovascular:  Negative for chest pain.   Gastrointestinal:  Positive for abdominal pain. Negative for constipation, diarrhea, nausea and vomiting.   Endocrine: Negative for cold intolerance and heat intolerance.   Genitourinary:  Negative for decreased urine volume, difficulty urinating, dysuria, flank pain, frequency, hematuria and urgency.   Musculoskeletal:  Negative for arthralgias, back pain, myalgias and neck pain.   Skin:  Negative for rash and wound.   Allergic/Immunologic: Negative for environmental allergies, food allergies and immunocompromised state.   Neurological:  Negative for dizziness, facial asymmetry, weakness, light-headedness, numbness and headaches.   Hematological:  Negative for adenopathy. Does not bruise/bleed easily.   Psychiatric/Behavioral:  Negative for agitation, behavioral problems and confusion.      Objective:     Vital Signs (Most Recent):  Temp: 97.9 °F (36.6 °C) (06/24/24 1935)  Pulse: 79 (06/24/24 1935)  Resp: 17 (06/24/24 1935)  BP: 138/65 (06/24/24 1935)  SpO2: 95 % (06/24/24 1935) Vital Signs (24h Range):  Temp:  [97.8 °F (36.6 °C)-98.5 °F (36.9 °C)] 97.9 °F (36.6 °C)  Pulse:  [67-84] 79  Resp:  [14-18] 17  SpO2:  [92 %-100 %] 95 %  BP: ()/(53-96) 138/65     Weight: 74.2 kg (163 lb 9.3 oz)  Body mass index is 49.92 kg/m².    Estimated Creatinine Clearance: 9.4 mL/min (A) (based on SCr of 5.1 mg/dL (H)).     Physical Exam  Constitutional:       General: She is not in acute distress.     Appearance: She is well-developed. She is obese. She is ill-appearing. She is not  toxic-appearing or diaphoretic.       HENT:      Head: Normocephalic and atraumatic.   Cardiovascular:      Rate and Rhythm: Normal rate and regular rhythm.      Heart sounds: Normal heart sounds. No murmur heard.     No friction rub. No gallop.   Pulmonary:      Effort: Pulmonary effort is normal. No respiratory distress.      Breath sounds: Normal breath sounds. No wheezing or rales.   Abdominal:      General: Bowel sounds are normal. There is no distension.      Palpations: Abdomen is soft. There is no mass.      Tenderness: There is abdominal tenderness in the periumbilical area and left upper quadrant. There is no guarding or rebound.       Skin:     General: Skin is warm and dry.   Neurological:      Mental Status: She is alert.      Comments: Oriented t o person and city,   Psychiatric:         Behavior: Behavior normal.          Significant Labs: Blood Culture:   Recent Labs   Lab 02/04/24  1705 05/07/24  2104 06/12/24  0949 06/12/24  0954   LABBLOO No growth after 5 days.  No growth after 5 days. No growth after 5 days.  No growth after 5 days. No growth after 5 days. No growth after 5 days.     CBC:   Recent Labs   Lab 06/23/24  0517 06/24/24  0314   WBC 3.70* 4.30   HGB 10.4* 9.7*   HCT 32.4* 29.8*    160     CMP:   Recent Labs   Lab 06/23/24  0517 06/24/24  0314 06/24/24  0942 06/24/24  1219   * 132*  --   --    K 4.4 4.9  --   --    CL 93* 96  --   --    CO2 25 24  --   --    * 203*  --   --    BUN 37* 54* 57* 20   CREATININE 4.7* 5.1*  --   --    CALCIUM 8.8 9.3  --   --    PROT 7.8 7.8  --   --    ALBUMIN 3.0* 3.1*  --   --    BILITOT 0.9 0.7  --   --    ALKPHOS 129 162*  --   --    AST 19 18  --   --    ALT 11 9*  --   --    ANIONGAP 10 12  --   --      Urine Culture:   Recent Labs   Lab 05/07/24  2223 05/29/24  0118 06/21/24 2027   LABURIN ESCHERICHIA COLI  > 100,000 cfu/ml  * Multiple organisms isolated. None in predominance.  Repeat if  clinically necessary. ELIZABETH  KRUSEI  10,000 - 49,999 cfu/ml  Treatment of asymptomatic candiduria is not recommended (except for   specific populations). Candida isolated in the urine typically   represents colonization. If an indwelling urinary catheter is present  it should be removed or replaced.  *     Urine Studies:   Recent Labs   Lab 06/21/24 2027   COLORU Yellow  Yellow   APPEARANCEUA Clear  Clear   PHUR 6.0  6.0   SPECGRAV 1.020  1.020   PROTEINUA 3+*  3+*   GLUCUA Negative  Negative   KETONESU Negative  Negative   BILIRUBINUA Negative  Negative   OCCULTUA 3+*  3+*   NITRITE Negative  Negative   LEUKOCYTESUR 3+*  3+*   RBCUA 14*   WBCUA 74*   BACTERIA None   SQUAMEPITHEL 1   HYALINECASTS 0     All pertinent labs within the past 24 hours have been reviewed.    Significant Imaging: I have reviewed all pertinent imaging results/findings within the past 24 hours.  Procedure Component Value Units Date/Time   X-Ray Chest 1 View [[id number]] Resulted: 06/22/24 0054   Order Status: Completed Updated: 06/22/24 0056   Narrative:     EXAMINATION:  XR CHEST 1 VIEW    CLINICAL HISTORY:  infectious work up;    TECHNIQUE:  Single frontal view of the chest was performed.    COMPARISON:  06/11/2024    FINDINGS:  Cardiac monitoring leads overlie the chest.  There is a stably positioned left-sided central venous catheter in place.  Postoperative change of median sternotomy.  There is unchanged enlargement of the cardiomediastinal silhouette noting atherosclerosis of the thoracic aorta.  The lungs are symmetrically expanded with diffuse increased interstitial and parenchymal attenuation similar to prior study.  Findings can be seen with interstitial edema or infectious process.  Clinical correlation advised.  No large region of confluent airspace consolidation or substantial volume of pleural fluid identified.  Osseous structures are intact.   Impression:       Please see above.      Electronically signed by: Monik Lara MD  Date:  06/22/2024  Time: 00:54   CT Head Without Contrast [[id number]] Resulted: 06/21/24 2123   Order Status: Completed Updated: 06/21/24 2125   Narrative:     EXAMINATION:  CT HEAD WITHOUT CONTRAST    CLINICAL HISTORY:  Mental status change, unknown cause;    TECHNIQUE:  Low dose axial images were obtained through the head.  Coronal and sagittal reformations were also performed. Contrast was not administered.    COMPARISON:  06/11/2024.    FINDINGS:  Exam quality is limited by motion and streak artifact.    Generalized cerebral volume loss and chronic ischemic changes as seen previously.    No evidence of acute territorial infarct, hemorrhage, mass effect, or midline shift.    Ventricles are prominent and stable when compared with the prior study.  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    No displaced calvarial fracture.    Visualized paranasal sinuses and mastoid air cells are essentially clear.   Impression:       No acute finding or detrimental change when compared with 06/11/2024, allowing for motion and artifact limitations.    Other findings discussed in the body of the report.      Electronically signed by: Lino Peter MD  Date: 06/21/2024  Time: 21:23      Imaging History     2024    Date Procedure Name Study Review Link PACS Link Status Accession Number Location   06/22/24 05:39 AM Cardiac monitoring strips Study Review  Final     06/21/24 11:33 PM X-Ray Chest 1 View Study Review  Images Final [id number] [facility]   06/21/24 08:52 PM CT Head Without Contrast Study Review  Images Final [id number] [facility]   06/12/24 04:40 AM Cardiac monitoring strips Study Review  Final     06/11/24 11:59 PM X-Ray Hip 2 or 3 views Right with Pelvis when performed Study Review  Images Final [id number] [facility]   06/11/24 11:59 PM X-Ray Chest AP Portable Study Review  Images Final [id number] [facility]   06/11/24 10:25 PM CT Cervical Spine Without Contrast Study Review   Images Final [id number] [facility]   06/11/24 10:25 PM CT Maxillofacial Without Contrast Study Review  Images Final [id number] [facility]   06/11/24 10:25 PM CT Head Without Contrast Study Review  Images Final [id number] [facility]   06/03/24 10:50 AM Cardiac monitoring strips Study Review  Final     06/02/24 07:52 PM Cardiac monitoring strips Study Review  Final     06/01/24 10:37 PM Cardiac monitoring strips Study Review  Final     06/01/24 03:03 PM Cardiac monitoring strips Study Review  Final     05/30/24 02:49 PM Cardiac monitoring strips Study Review  Final     05/29/24 09:47 PM MRI Brain Without Contrast Study Review  Images Final [id number] [facility]   05/29/24 05:23 AM Cardiac monitoring strips Study Review  Final     05/28/24 09:29 PM CTA STROKE MULTI-PHASE Study Review  Images Final [id number] [facility]

## 2024-06-25 NOTE — ASSESSMENT & PLAN NOTE
Patient's anemia is currently controlled. Has not received any PRBCs to date. Etiology likely d/t chronic disease due to ESRD  Current CBC reviewed-   Lab Results   Component Value Date    HGB 9.6 (L) 06/25/2024    HCT 29.2 (L) 06/25/2024     Monitor serial CBC and transfuse if patient becomes hemodynamically unstable, symptomatic or H/H drops below 7/21.

## 2024-06-25 NOTE — SUBJECTIVE & OBJECTIVE
Interval History:   HD completed yesterday with 2 L removed. No distress this AM. Pending CT.     Review of patient's allergies indicates:   Allergen Reactions    Ciprofloxacin Itching    Iodine      Kidney injury    Pcn [penicillins]      Rash; tolerated ceftriaxone on 1/13/20     Current Facility-Administered Medications   Medication Frequency    [START ON 6/26/2024] 0.9%  NaCl infusion Once    acetaminophen tablet 1,000 mg Q8H PRN    acetaminophen tablet 650 mg Q6H PRN    allopurinol split tablet 50 mg Every other day    apixaban tablet 5 mg BID    atorvastatin tablet 80 mg Daily    barium sulfate (READI-CAT) suspension 450 mL ONCE PRN    calcitRIOL capsule 0.5 mcg Daily    calcium acetate(phosphat bind) capsule 667 mg TID WM    carvediloL tablet 6.25 mg BID    clopidogreL tablet 75 mg Daily    dextrose 10% bolus 125 mL 125 mL PRN    dextrose 10% bolus 250 mL 250 mL PRN    diphenhydrAMINE capsule 25 mg Q6H PRN    diphenhydrAMINE injection 50 mg On Call Procedure    famotidine tablet 20 mg Daily    glucagon (human recombinant) injection 1 mg PRN    glucose chewable tablet 16 g PRN    glucose chewable tablet 24 g PRN    heparin (porcine) injection 1,000 Units PRN    hydrALAZINE tablet 50 mg TID    hydrocortisone 2.5 % cream BID PRN    hydrOXYzine HCL tablet 25 mg TID PRN    insulin aspart U-100 pen 0-5 Units QID (AC + HS) PRN    insulin aspart U-100 pen 3 Units TIDWM    melatonin tablet 6 mg Nightly PRN    naloxone 0.4 mg/mL injection 0.02 mg PRN    prochlorperazine injection Soln 5 mg Q6H PRN    sodium chloride 0.9% flush 10 mL Q12H PRN       Objective:     Vital Signs (Most Recent):  Temp: 97.8 °F (36.6 °C) (06/25/24 0750)  Pulse: 83 (06/25/24 0750)  Resp: 18 (06/25/24 0750)  BP: 139/69 (06/25/24 0750)  SpO2: 96 % (06/25/24 0750) Vital Signs (24h Range):  Temp:  [97.8 °F (36.6 °C)-97.9 °F (36.6 °C)] 97.8 °F (36.6 °C)  Pulse:  [67-87] 83  Resp:  [16-18] 18  SpO2:  [92 %-98 %] 96 %  BP: ()/(53-85) 139/69      Weight: 74.2 kg (163 lb 9.3 oz) (06/22/24 0400)  Body mass index is 49.92 kg/m².  Body surface area is 1.59 meters squared.    I/O last 3 completed shifts:  In: 650.1 [I.V.:350.1; Other:300]  Out: 3200 [Urine:550; Other:2650]     Physical Exam  Vitals and nursing note reviewed.   Constitutional:       Appearance: She is not toxic-appearing or diaphoretic.      Comments: Chronically ill appearing   HENT:      Head: Normocephalic and atraumatic.      Nose: Nose normal.      Mouth/Throat:      Mouth: Mucous membranes are moist.   Eyes:      General: No scleral icterus.  Cardiovascular:      Rate and Rhythm: Normal rate and regular rhythm.      Pulses: Normal pulses.   Pulmonary:      Effort: Pulmonary effort is normal. No respiratory distress.      Breath sounds: No wheezing, rhonchi or rales.   Abdominal:      General: Bowel sounds are normal. There is no distension.      Palpations: Abdomen is soft.      Tenderness: There is abdominal tenderness (RLQ/suprapubic). There is no guarding.   Musculoskeletal:         General: Normal range of motion.      Cervical back: Normal range of motion.      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      Left Lower Extremity: Left leg is amputated above knee.   Skin:     General: Skin is warm and dry.      Capillary Refill: Capillary refill takes less than 2 seconds.   Neurological:      General: No focal deficit present.      Mental Status: She is alert.      Cranial Nerves: No facial asymmetry.      Motor: No tremor or seizure activity.      Comments: AAOx3. Will nod yes or no, occasionally answer questions, and follows basic commands.   Psychiatric:         Mood and Affect: Affect is tearful.         Speech: She is noncommunicative.         Behavior: Behavior is cooperative.          Significant Labs:  CBC:   Recent Labs   Lab 06/25/24  0904   WBC 3.79*   RBC 2.92*   HGB 9.6*   HCT 29.2*      *   MCH 32.9*   MCHC 32.9     CMP:   Recent Labs   Lab  06/25/24  0904   *   CALCIUM 9.5   ALBUMIN 3.0*   PROT 7.9   *   K 4.3   CO2 22*      BUN 32*   CREATININE 3.5*   ALKPHOS 127   ALT 10   AST 17   BILITOT 0.8     All labs within the past 24 hours have been reviewed.

## 2024-06-25 NOTE — CONSULTS
HARSHA consulted for PIV insertion in real time using ultrasound guidance.    Indication: PVA  Gauge and length: 20 g x 1 3/4 inch  Location: [facility]

## 2024-06-25 NOTE — ASSESSMENT & PLAN NOTE
Patient has acute metabolic encephalopathy that likely secondary to UTI.Treatment for underlying cause is underway. Pt with recurrent admissions for encephalopathy with extensive work ups including EEG, CTH, MRI brain, and vitamin panels. Will monitor neuro status carefully, avoid narcotics and benzos that will exacerbate agitation, and use PRN anti-psychotics for controls of behavior for self harm.  -Initially hypoglycemic which improved with treatment. Na 129.  -UA concerning for infection.  -ucx with candida krusei, ID consulted for treatment recommendations:   - obtain CT abd/pelvis - no acute intraabdominal abnormality   -CXR with diffuse increased interstitial and parenchymal attenuation similar to prior study. No large region of confluent airspace consolidation or substantial volume of pleural fluid identified.   -CTH-negative  -Delirium precautions   - reports progressive decline in patient over the last few months. Initiated NH paperwork for patient. CM notified for assistance

## 2024-06-25 NOTE — NURSING
Called to bedside by , patient woke up confused and pulled out PIV, he was unable to stop her. Notified on call and Charge, patient has had multiple US guided PIV placement, very difficult venous access, HD pt. No IVF or IV abx due at this time. Order placed for PICC team vascular access. Pt oriented to person and place, intermittent confusion ongoing issue known to care team. PIV insertion site dressed, linens and gown changed with spouse's assistance. No other needs at this time bed in low locked position call light in reach will continue to monitor

## 2024-06-25 NOTE — ASSESSMENT & PLAN NOTE
59 yo female with BL AKAs, ESRD on HD and other comorbidities admitted with lethargy, anorexia and confusion.  CTH negative for acute findings.  UA + and Ucx with 10-49k CFU C Krusei.  Abd exam and history not cw UTI as cause of cognitive findings/condition but concern for abd process and other etiology causing current condition. AF and WBC 3.79.  Lost IV access.  Hep C and HIV negative.  CT A/P without acute findings - constipation noted - ? Source of adb pain.  More oriented today.   Per chart review urine was straight cath.    Plan:  1. Repeat UA and Urine Cx with straight cath as patient makes minimal urine - need to reassess if Candida still present.  2. Rec laxatives for constipation  3.  Hold abx and antifungals not recommended  4. Seen with ID staff   5. Discussed with primary team, Viry Griffin PA-C  5. Will follow.

## 2024-06-25 NOTE — ASSESSMENT & PLAN NOTE
58 y.o. Black or  Female ESRD-HD M-W-F presents to ED on 6/21/2024 with diagnosis of: Hypoglycemia [E16.2];Encephalopathy [G93.40];Chest pain [R07.9];Urinary tract infection with hematuria, site unspecified [N39.0, R31.9]   Nephrology consulted for inpatient ESRD-HD management    Outpatient HD Information:  -Dialysis modality: Hemodialysis  -Outpatient HD unit: [facility] [place]  -Nephrologist: ?  -HD TX days: Monday/Wednesday/Friday, duration of treatment: 3 hrs   -Last HD TX prior to hospital admission: 6/21/24  -Dialysis access: dialysis catheter   -Residual urine: Minium  -EDW: 67 kg     Assessment:   - Dialysis for metabolic clearance and volume management will be provided tomorrow.  - Continue to monitor intake and output  - Please avoid gadolinium, fleets, phos-based laxatives, NSAIDs  - Dialysis thrice weekly unless more urgent indications arise. Will evaluate RRT requirements Daily.    Anemia of ESRD   Recent Labs   Lab 06/23/24  0517 06/24/24  0314 06/25/24  0904   WBC 3.70* 4.30 3.79*   HGB 10.4* 9.7* 9.6*   HCT 32.4* 29.8* 29.2*    160 169       Lab Results   Component Value Date    FESATURATED 35 06/22/2024    FERRITIN 757 (H) 06/22/2024       - Goal in ESRD is Hgb of 10-11. Near target.   - EPO can be administered and dosed per his OP unit upon discharge.    Mineral Bone Disease in ESRD   Lab Results   Component Value Date    PTH 1,132 (H) 06/17/2024    CALCIUM 9.5 06/25/2024    ALBUMIN 3.0 (L) 06/25/2024    CAION 0.92 (L) 05/17/2023    PHOS 3.4 06/25/2024       - F/U PO4, Mg, Calcium. And albumin levels daily.   - Renal diet with protein intake goal 1.5 g/kg/d with 1 L fluid restriction   - Novasource with meals  - Continue home phos binder, phos 3.4

## 2024-06-25 NOTE — NURSING
Nurses Note -- 4 Eyes      6/25/2024   12:02 PM      Skin assessed during: Daily Assessment      [] No Altered Skin Integrity Present    []Prevention Measures Documented      [x] Yes- Altered Skin Integrity Present or Discovered   [] LDA Added if Not in Epic (Describe Wound)   [] New Altered Skin Integrity was Present on Admit and Documented in LDA   [] Wound Image Taken    Wound Care Consulted? No    Attending Nurse:  Flores Lin RN/Staff Member:   DEANGELO Cherry     No new wounds present upon today's assessment. Wound care consulted on 6/25/24

## 2024-06-25 NOTE — NURSING
Med prep ordered at 1500 arrived 2330 to unit. Spouse concerned patient is exhausted from dialysis and would like to start prep at 0330. Notified Leticia Ley PA-C of 's concerns agreed to reschedule prep start to 0330, RN confirmed with RT. RT requested call when prep is complete.  and patient inst on changes, verbalized understanding.

## 2024-06-25 NOTE — ASSESSMENT & PLAN NOTE
"Patient is identified as having Combined Systolic and Diastolic heart failure that is Chronic. CHF is currently controlled. Latest ECHO performed and demonstrates- Results for orders placed during the hospital encounter of 05/07/24    Echo    Interpretation Summary    Left Ventricle: The left ventricle is mildly dilated. Ventricular mass is normal. Normal wall thickness. Severe global hypokinesis present. There is severely reduced systolic function. Ejection fraction by visual approximation is 15%. There is diastolic dysfunction.    Right Ventricle: Severe right ventricular enlargement. Wall thickness is normal. Right ventricle wall motion has global hypokinesis. Systolic function is mildly reduced.    Left Atrium: Left atrium is severely dilated. There is an atrial septal aneurysm.    Right Atrium: Right atrium is dilated.    Aortic Valve: There is mild aortic regurgitation.    Mitral Valve: There is mild regurgitation.    Tricuspid Valve: There is annular dilation present. There is normal leaflet mobility. There is severe functional regurgitation.    Pulmonary Artery: The estimated pulmonary artery systolic pressure is at least 24 mmHg. PASP is likely under-estimated in the setting of severe tricuspid regurgitation.    IVC/SVC: Central venous pressure of at least 8 mmHg.    No vegetation seen.  . Continue Beta Blocker, lasix and monitor clinical status closely. Monitor on telemetry. Patient is off CHF pathway.  Monitor strict Is&Os and daily weights.  Place on fluid restriction of 1.5 L. Continue to stress to patient importance of self efficacy and  on diet for CHF. Last BNP reviewed- and noted below No results for input(s): "BNP", "BNPTRIAGEBLO" in the last 168 hours..  -Volume management with HD.    "

## 2024-06-25 NOTE — ASSESSMENT & PLAN NOTE
57 yo female with BL AKAs, ESRD on HD and other comorbidities admitted with lethargy, anorexia and confusion.  CTH negative for acute findings.  UA + and Ucx with 10-49k CFU C Krusei.  Abd exam and history not cw UTI as cause of cognitive findings/condition but concern for abd process and other etiology causing current condition. AF and WBC WNL.    Plan:  1. Rec CT ABD/Pelvis - with contrast if able to assess for IA process.  2. Hold abx and antifungals not recommended  3. Seen with ID staff   4. Discussed with primary team, Viry Griffin PA-C  5. Will follow.

## 2024-06-25 NOTE — CONSULTS
[facility] - Observation 11H  Infectious Disease  Consult Note    Patient Name: Suyapa Connelly  MRN: [id number]  Admission Date: 6/21/2024  Hospital Length of Stay: 2 days  Attending Physician: Frances Bee MD  Primary Care Provider: Feliciano Nguyen III, PA-C     Isolation Status: No active isolations    Patient information was obtained from patient, spouse/SO, and ER records.      Inpatient consult to Infectious Diseases  Consult performed by: Wan Casillas Jr., PA  Consult ordered by: Viry Griffin PA-C        Assessment/Plan:     Renal/  Acute cystitis without hematuria  57 yo female with BL AKAs, ESRD on HD and other comorbidities admitted with lethargy, anorexia and confusion.  CTH negative for acute findings.  UA + and Ucx with 10-49k CFU C Krusei.  Abd exam and history not cw UTI as cause of cognitive findings/condition but concern for abd process and other etiology causing current condition. AF and WBC WNL.    Plan:  1. Rec CT ABD/Pelvis - with contrast if able to assess for IA process.  2. Hold abx and antifungals not recommended  3. Seen with ID staff   4. Discussed with primary team, Viry Griffin PA-C  5. Will follow.        Thank you for your consult. I will follow-up with patient. Please contact us if you have any additional questions.    VENANCIO Green  Infectious Disease  [facility] - Observation 11H    Subjective:     Principal Problem: Acute encephalopathy    HPI: Intake history taken from chart as patient unable to provide but details reviewed and confirmed with :     Suyapa Connelly is a 58 y.o. female with a PMHx of HTN, GERD, HLD, PVD, ESRD on HD MFW, b/l AKAs, CAD s/p CABG, afib on eliquis, depression, and anxiety who presented to the ED 6/21 for evaluation of confusion. HPI and ROS limited secondary to patient's mental status and poor participation. Per patient's  she has been more sleepy and less interactive over the past several days prior to admit. He  reported that her urine has been foul-smelling and more cloudy. He stated she was also peeing more frequently in complaining of burning with urination and some occasional suprapubic pain. Denied any fevers or chills. The patient denied CP, SOB, abdominal pain, or dysuria. Per  patient was dialyzed today a full dialysis and they came straight here after.      In the ED, VSS, afebrile. CBC with stable leukopenia and anemia. Na 129, Chloride 94, Bicarb 22, Glucose 50. POC glucose 150. Cr 2.7 (consistent with ESRD). Albumin 3.2. Tbili 1.1. Mag 2.1. UA with 3+ leukocytosis, 74 WBCs, but no bacteria. CTH with no acute finding or detrimental change when compared with 06/11/2024, allowing for motion and artifact limitations. The patient received 1g IV Rocephin.    Interval history:  Patient admitted and received 2 days of rocephin but has been off last 2 days.  Urine culture showed 10-49k CFU of C krusei but no bacterial growth.  ID consulted for candida uti.  Patient seen and is confused on exam.  Currently denies dysuria but admits to abd pain.  reports she uses a Purewick at home and though she is on HD, she still produces some urine daily. The patient denies any recent fever, chills, or sweats.      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    Osteomyelitis     Osteomyelitis of left foot 04/30/2021    Ulcer of left foot     Vaginal delivery     x1       Past Surgical History:   Procedure Laterality Date    ABOVE-KNEE AMPUTATION Left 5/18/2021    Procedure: AMPUTATION, ABOVE KNEE;  Surgeon: Teddy Huber MD;  Location: [facility] OR [address] [place] [address];  Service: Vascular;  Laterality: Left;    ABOVE-KNEE AMPUTATION Right 3/18/2022    Procedure:  AMPUTATION, ABOVE KNEE;  Surgeon: DAYNE Florez II, MD;  [facility];  Service: Vascular;  Laterality: Right;    Angiogram - Right Extremity Right 7/9/15    angiogram-left leg  10/6/15    ANGIOGRAPHY OF LOWER EXTREMITY Left 4/29/2021    Procedure: ANGIOGRAM, LOWER EXTREMITY;  Surgeon: Teddy Huber MD;  [facility];  Service: Vascular;  Laterality: Left;    BELOW KNEE AMPUTATION OF LOWER EXTREMITY Right 12/28/2021    Procedure: AMPUTATION, BELOW KNEE;  Surgeon: Kaitlyn Rojas MD;  Location: [facility] OR;  Service: General;  Laterality: Right;    CATHETERIZATION OF BOTH LEFT AND RIGHT HEART N/A 12/18/2019    Procedure: CATHETERIZATION, HEART, BOTH LEFT AND RIGHT;  Surgeon: Que Fernando III, MD;  Location: [facility] CATH LAB;  Service: Cardiology;  Laterality: N/A;    CORONARY ANGIOGRAPHY N/A 12/18/2019    Procedure: ANGIOGRAM, CORONARY ARTERY;  Surgeon: Que Fernando III, MD;  Location: [facility] CATH LAB;  Service: Cardiology;  Laterality: N/A;    CORONARY ANGIOGRAPHY INCLUDING BYPASS GRAFTS WITH CATHETERIZATION OF LEFT HEART N/A 7/28/2020    Procedure: ANGIOGRAM, CORONARY, INCLUDING BYPASS GRAFT, WITH LEFT HEART CATHETERIZATION, 9 am;  Surgeon: Rachel Easley MD;  Location: [facility] CATH LAB;  Service: Cardiology;  Laterality: N/A;    CORONARY ARTERY BYPASS GRAFT (CABG) N/A 1/14/2020    Procedure: CORONARY ARTERY BYPASS GRAFT (CABG) x 1     Off Pump;  Surgeon: Huang Altamirano MD;  Location: [address];  Service: Cardiovascular;  Laterality: N/A;    CREATION OF FEMORAL-TIBIAL ARTERY BYPASS Left 4/29/2021    Procedure: CREATION, BYPASS, ARTERIAL, FEMORAL TO ANTERIOR TIBIAL;  Surgeon: Teddy Huber MD;  Location: [facility] OR [address] [place] [address];  Service: Vascular;  Laterality: Left;    CREATION OF FEMOROPOPLITEAL ARTERIAL BYPASS USING GRAFT Left 8/18/2020    Procedure: CREATION, BYPASS, ARTERIAL, FEMORAL TO POPLITEAL, USING GRAFT, LEFT LOWER EXTREMITY;  Surgeon: Teddy BLANCA  MD Cecile;  Location: [facility] OR;  Service: Vascular;  Laterality: Left;  REQUEST 7:15 A.M. START----COVID NEGATIVE ON 8/17  1ST CASE STARTE PER LEANA ON 8/7/2020 @ 942AM-LO  RN PREOP 8/12/2020   T/S-----CLEARED BY CARDS-------PENDING INSURANCE    DEBRIDEMENT OF FOOT Left 9/8/2020    Procedure: DEBRIDEMENT, FOOT;  Surgeon: Rosio Mayes DPM;  Location: [facility] OR;  Service: Podiatry;  Laterality: Left;  request neoxx .   RN Pre Op 9-4-2020, Covid negative on 9/5/20. C A    DEBRIDEMENT OF FOOT  3/4/2021    Procedure: DEBRIDEMENT, FOOT;  Surgeon: Teddy Huber MD;  Location: [facility] OR;  Service: Vascular;;    DEBRIDEMENT OF FOOT Left 3/9/2021    Procedure: DEBRIDEMENT, FOOT, bone biopsy;  Surgeon: Rosio Mayes DPM;  Location: [facility] OR;  Service: Podiatry;  Laterality: Left;  Request neoxx---COVID IN AM  REQUESTING NOON START  RN Phone Pre op.On Blood thinners Plavix and Eliquis.  Covid am of surgery. C A    DEBRIDEMENT OF FOOT Left 5/4/2021    Procedure: DEBRIDEMENT, FOOT;  Surgeon: Farooq Morley DPM;  Location: [address];  Service: Podiatry;  Laterality: Left;    INSERTION OF TUNNELED CENTRAL VENOUS HEMODIALYSIS CATHETER N/A 1/27/2020    Procedure: Insertion, Catheter, Central Venous, Hemodialysis;  Surgeon: ESTEBAN Gomez III, MD;  Location: [facility] CATH LAB;  Service: Peripheral Vascular;  Laterality: N/A;    INSERTION OF TUNNELED CENTRAL VENOUS HEMODIALYSIS CATHETER  5/10/2023    Procedure: Insertion, Catheter, Central Venous, Hemodialysis;  Surgeon: Romulo Queen MD;  Location: [facility] CATH LAB;  Service: Cardiology;;    PERCUTANEOUS TRANSLUMINAL ANGIOPLASTY N/A 3/4/2021    Procedure: PTA (ANGIOPLASTY, PERCUTANEOUS, TRANSLUMINAL);  Surgeon: Teddy Huber MD;  Location: [facility] OR;  Service: Vascular;  Laterality: N/A;    REMOVAL OF ARTERIOVENOUS GRAFT Left 5/27/2021    Procedure: REMOVAL, GRAFT, LEFT LOWER EXTREMITY, WOUND EXPLORATION;  Surgeon: Teddy Huber MD;  Location: [facility] OR  2ND FLR;  Service: Vascular;  Laterality: Left;    REMOVAL OF NAIL OF DIGIT Left 3/9/2021    Procedure: REMOVAL, NAIL, DIGIT;  Surgeon: Rosio Mayes DPM;  Location: [facility] OR;  Service: Podiatry;  Laterality: Left;    RIGHT HEART CATHETERIZATION Right 5/10/2023    Procedure: INSERTION, CATHETER, RIGHT HEART;  Surgeon: Romulo Queen MD;  Location: [facility] CATH LAB;  Service: Cardiology;  Laterality: Right;    THROMBECTOMY Left 3/4/2021    Procedure: THROMBECTOMY, LEFT LOWER EXTREMITY BYPASS GRAFT, ANGIOGRAM, POSSIBLE INTERVENTION, POSSIBLE LEFT LOWER EXTREMITY BYPASS;  Surgeon: Teddy Huber MD;  Location: [facility] OR;  Service: Vascular;  Laterality: Left;    THROMBECTOMY Left 4/29/2021    Procedure: GRAFT THROMBECTOMY, LEFT LOWER EXTREMITY;  Surgeon: Teddy Huber MD;  Location: [facility] 2ND FLR;  Service: Vascular;  Laterality: Left;  14.5 min  1179.85 mGy  341.01 Gycm2  240 ml dye    THROMBECTOMY  10/22/2021    Procedure: THROMBECTOMY;  Surgeon: Saad Arenas MD;  Location: [facility] CATH LAB/EP;  Service: Cardiology;;       Review of patient's allergies indicates:   Allergen Reactions    Ciprofloxacin Itching    Iodine      Kidney injury    Pcn [penicillins]      Rash; tolerated ceftriaxone on 1/13/20       Medications:  Medications Prior to Admission   Medication Sig    acetaminophen (TYLENOL) 500 MG tablet Take 500 mg by mouth every 6 (six) hours as needed for Pain.    allopurinoL (ZYLOPRIM) 100 MG tablet Take 0.5 tablets (50 mg total) by mouth every other day.    apixaban (ELIQUIS) 5 mg Tab Take 1 tablet (5 mg total) by mouth 2 (two) times daily.    atorvastatin (LIPITOR) 80 MG tablet Take 1 tablet (80 mg total) by mouth once daily.    blood sugar diagnostic Strp Use to check blood glucose 2 (two) times a day.    calcitRIOL (ROCALTROL) 0.5 MCG Cap Take 1 capsule (0.5 mcg total) by mouth once daily.    calcium acetate,phosphat bind, (PHOSLO) 667 mg capsule Take 1 capsule (667 mg total) by mouth 3  "(three) times daily with meals.    carvediloL (COREG) 6.25 MG tablet Take 1 tablet (6.25 mg total) by mouth 2 (two) times daily.    clopidogreL (PLAVIX) 75 mg tablet Take 1 tablet (75 mg total) by mouth once daily.    famotidine (PEPCID) 20 MG tablet Take 1 tablet (20 mg total) by mouth 2 (two) times daily.    hydrALAZINE (APRESOLINE) 50 MG tablet Take 1 tablet (50 mg total) by mouth 3 (three) times daily.    insulin aspart, niacinamide, (FIASP FLEXTOUCH U-100 INSULIN) 100 unit/mL (3 mL) InPn Inject 3 Units into the skin 3 (three) times daily with meals.    lancets Misc To check BG 3 times daily, to use with insurance preferred meter    multivitamin (ONE DAILY MULTIVITAMIN) per tablet Take 1 tablet by mouth once daily.    pen needle, diabetic 32 gauge x 5/32" Ndle 1 Units by Misc.(Non-Drug; Combo Route) route before meals as needed (SSI).    sertraline (ZOLOFT) 100 MG tablet Take 1 tablet (100 mg total) by mouth once daily.    sodium bicarbonate 650 MG tablet TAKE 1 TABLET(650 MG) BY MOUTH THREE TIMES DAILY    traZODone (DESYREL) 50 MG tablet Take 0.5 tablets (25 mg total) by mouth every evening.    wound dressings (TRIAD WOUND DRESSING) Pste Apply 2 g topically once daily.     Antibiotics (From admission, onward)      None          Antifungals (From admission, onward)      None          Antivirals (From admission, onward)      None             Immunization History   Administered Date(s) Administered    COVID-19, MRNA, LN-S, PF (Pfizer) (Purple Cap) 03/09/2021, 03/30/2021    Hepatitis B, Adult 06/02/2023, 07/04/2023, 08/04/2023    PPD Test 05/14/2024       Family History       Problem Relation (Age of Onset)    Diabetes Mother, Father, Paternal Grandmother    Heart disease Maternal Grandmother    No Known Problems Maternal Grandfather, Paternal Grandfather          Social History     Socioeconomic History    Marital status:    Occupational History    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      Social Determinants of Health     Financial Resource Strain: Patient Declined (6/22/2024)    Overall Financial Resource Strain (CARDIA)     Difficulty of Paying Living Expenses: Patient declined   Food Insecurity: Patient Declined (6/22/2024)    Hunger Vital Sign     Worried About Running Out of Food in the Last Year: Patient declined     Ran Out of Food in the Last Year: Patient declined   Transportation Needs: No Transportation Needs (6/22/2024)    TRANSPORTATION NEEDS     Transportation : No   Physical Activity: Inactive (6/22/2024)    Exercise Vital Sign     Days of Exercise per Week: 0 days     Minutes of Exercise per Session: 0 min   Stress: Patient Declined (6/22/2024)    [employer] [employer] - Occupational Stress Questionnaire     Feeling of Stress : Patient declined   Housing Stability: Patient Declined (6/22/2024)    Housing Stability Vital Sign     Unable to Pay for Housing in the Last Year: Patient declined     Homeless in the Last Year: Patient declined     Review of Systems   Constitutional:  Negative for appetite change, chills, diaphoresis, fatigue, fever and unexpected weight change.   HENT:  Negative for congestion, ear pain, hearing loss, sore throat and tinnitus.    Eyes:  Negative for pain, redness and visual disturbance.   Respiratory:  Negative for cough, chest tightness, shortness of breath and wheezing.    Cardiovascular:  Negative for chest pain.   Gastrointestinal:  Positive for abdominal pain. Negative for constipation, diarrhea, nausea and vomiting.   Endocrine: Negative for cold intolerance and heat intolerance.   Genitourinary:  Negative for decreased urine volume, difficulty urinating, dysuria, flank pain, frequency,  hematuria and urgency.   Musculoskeletal:  Negative for arthralgias, back pain, myalgias and neck pain.   Skin:  Negative for rash and wound.   Allergic/Immunologic: Negative for environmental allergies, food allergies and immunocompromised state.   Neurological:  Negative for dizziness, facial asymmetry, weakness, light-headedness, numbness and headaches.   Hematological:  Negative for adenopathy. Does not bruise/bleed easily.   Psychiatric/Behavioral:  Negative for agitation, behavioral problems and confusion.      Objective:     Vital Signs (Most Recent):  Temp: 97.9 °F (36.6 °C) (06/24/24 1935)  Pulse: 79 (06/24/24 1935)  Resp: 17 (06/24/24 1935)  BP: 138/65 (06/24/24 1935)  SpO2: 95 % (06/24/24 1935) Vital Signs (24h Range):  Temp:  [97.8 °F (36.6 °C)-98.5 °F (36.9 °C)] 97.9 °F (36.6 °C)  Pulse:  [67-84] 79  Resp:  [14-18] 17  SpO2:  [92 %-100 %] 95 %  BP: ()/(53-96) 138/65     Weight: 74.2 kg (163 lb 9.3 oz)  Body mass index is 49.92 kg/m².    Estimated Creatinine Clearance: 9.4 mL/min (A) (based on SCr of 5.1 mg/dL (H)).     Physical Exam  Constitutional:       General: She is not in acute distress.     Appearance: She is well-developed. She is obese. She is ill-appearing. She is not toxic-appearing or diaphoretic.       HENT:      Head: Normocephalic and atraumatic.   Cardiovascular:      Rate and Rhythm: Normal rate and regular rhythm.      Heart sounds: Normal heart sounds. No murmur heard.     No friction rub. No gallop.   Pulmonary:      Effort: Pulmonary effort is normal. No respiratory distress.      Breath sounds: Normal breath sounds. No wheezing or rales.   Abdominal:      General: Bowel sounds are normal. There is no distension.      Palpations: Abdomen is soft. There is no mass.      Tenderness: There is abdominal tenderness in the periumbilical area and left upper quadrant. There is no guarding or rebound.       Skin:     General: Skin is warm and dry.   Neurological:      Mental Status:  She is alert.      Comments: Oriented t o person and city,   Psychiatric:         Behavior: Behavior normal.          Significant Labs: Blood Culture:   Recent Labs   Lab 02/04/24  1705 05/07/24  2104 06/12/24  0949 06/12/24  0954   LABBLOO No growth after 5 days.  No growth after 5 days. No growth after 5 days.  No growth after 5 days. No growth after 5 days. No growth after 5 days.     CBC:   Recent Labs   Lab 06/23/24 0517 06/24/24 0314   WBC 3.70* 4.30   HGB 10.4* 9.7*   HCT 32.4* 29.8*    160     CMP:   Recent Labs   Lab 06/23/24  0517 06/24/24  0314 06/24/24  0942 06/24/24  1219   * 132*  --   --    K 4.4 4.9  --   --    CL 93* 96  --   --    CO2 25 24  --   --    * 203*  --   --    BUN 37* 54* 57* 20   CREATININE 4.7* 5.1*  --   --    CALCIUM 8.8 9.3  --   --    PROT 7.8 7.8  --   --    ALBUMIN 3.0* 3.1*  --   --    BILITOT 0.9 0.7  --   --    ALKPHOS 129 162*  --   --    AST 19 18  --   --    ALT 11 9*  --   --    ANIONGAP 10 12  --   --      Urine Culture:   Recent Labs   Lab 05/07/24 2223 05/29/24  0118 06/21/24 2027   LABURIN ESCHERICHIA COLI  > 100,000 cfu/ml  * Multiple organisms isolated. None in predominance.  Repeat if  clinically necessary. ELIZABETH KRUSEI  10,000 - 49,999 cfu/ml  Treatment of asymptomatic candiduria is not recommended (except for   specific populations). Candida isolated in the urine typically   represents colonization. If an indwelling urinary catheter is present  it should be removed or replaced.  *     Urine Studies:   Recent Labs   Lab 06/21/24 2027   COLORU Yellow  Yellow   APPEARANCEUA Clear  Clear   PHUR 6.0  6.0   SPECGRAV 1.020  1.020   PROTEINUA 3+*  3+*   GLUCUA Negative  Negative   KETONESU Negative  Negative   BILIRUBINUA Negative  Negative   OCCULTUA 3+*  3+*   NITRITE Negative  Negative   LEUKOCYTESUR 3+*  3+*   RBCUA 14*   WBCUA 74*   BACTERIA None   SQUAMEPITHEL 1   HYALINECASTS 0     All pertinent labs within the past 24 hours  have been reviewed.    Significant Imaging: I have reviewed all pertinent imaging results/findings within the past 24 hours.  Procedure Component Value Units Date/Time   X-Ray Chest 1 View [[id number]] Resulted: 06/22/24 0054   Order Status: Completed Updated: 06/22/24 0056   Narrative:     EXAMINATION:  XR CHEST 1 VIEW    CLINICAL HISTORY:  infectious work up;    TECHNIQUE:  Single frontal view of the chest was performed.    COMPARISON:  06/11/2024    FINDINGS:  Cardiac monitoring leads overlie the chest.  There is a stably positioned left-sided central venous catheter in place.  Postoperative change of median sternotomy.  There is unchanged enlargement of the cardiomediastinal silhouette noting atherosclerosis of the thoracic aorta.  The lungs are symmetrically expanded with diffuse increased interstitial and parenchymal attenuation similar to prior study.  Findings can be seen with interstitial edema or infectious process.  Clinical correlation advised.  No large region of confluent airspace consolidation or substantial volume of pleural fluid identified.  Osseous structures are intact.   Impression:       Please see above.      Electronically signed by: Monik Lara MD  Date: 06/22/2024  Time: 00:54   CT Head Without Contrast [[id number]] Resulted: 06/21/24 2123   Order Status: Completed Updated: 06/21/24 2125   Narrative:     EXAMINATION:  CT HEAD WITHOUT CONTRAST    CLINICAL HISTORY:  Mental status change, unknown cause;    TECHNIQUE:  Low dose axial images were obtained through the head.  Coronal and sagittal reformations were also performed. Contrast was not administered.    COMPARISON:  06/11/2024.    FINDINGS:  Exam quality is limited by motion and streak artifact.    Generalized cerebral volume loss and chronic ischemic changes as seen previously.    No evidence of acute territorial infarct, hemorrhage, mass effect, or midline shift.    Ventricles are prominent and stable when compared with the prior  study.  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    No displaced calvarial fracture.    Visualized paranasal sinuses and mastoid air cells are essentially clear.   Impression:       No acute finding or detrimental change when compared with 06/11/2024, allowing for motion and artifact limitations.    Other findings discussed in the body of the report.      Electronically signed by: Lino Peter MD  Date: 06/21/2024  Time: 21:23      Imaging History     2024    Date Procedure Name Study Review Link PACS Link Status Accession Number Location   06/22/24 05:39 AM Cardiac monitoring strips Study Review  Final     06/21/24 11:33 PM X-Ray Chest 1 View Study Review  Images Final [id number] [facility]   06/21/24 08:52 PM CT Head Without Contrast Study Review  Images Final [id number] [facility]   06/12/24 04:40 AM Cardiac monitoring strips Study Review  Final     06/11/24 11:59 PM X-Ray Hip 2 or 3 views Right with Pelvis when performed Study Review  Images Final [id number] [facility]   06/11/24 11:59 PM X-Ray Chest AP Portable Study Review  Images Final [id number] [facility]   06/11/24 10:25 PM CT Cervical Spine Without Contrast Study Review  Images Final [id number] [facility]   06/11/24 10:25 PM CT Maxillofacial Without Contrast Study Review  Images Final [id number] [facility]   06/11/24 10:25 PM CT Head Without Contrast Study Review  Images Final [id number] [facility]   06/03/24 10:50 AM Cardiac monitoring strips Study Review  Final     06/02/24 07:52 PM Cardiac monitoring strips Study Review  Final     06/01/24 10:37 PM Cardiac monitoring strips Study Review  Final     06/01/24 03:03 PM Cardiac monitoring strips Study Review  Final     05/30/24 02:49 PM Cardiac monitoring strips Study Review  Final     05/29/24 09:47 PM MRI Brain Without Contrast Study Review  Images Final [id number] [facility]   05/29/24 05:23 AM Cardiac monitoring strips Study Review  Final     05/28/24 09:29 PM CTA STROKE  MULTI-PHASE Study Review  Images Final [id number]

## 2024-06-26 LAB
ALBUMIN SERPL BCP-MCNC: 3 G/DL (ref 3.5–5.2)
ALP SERPL-CCNC: 171 U/L (ref 55–135)
ALT SERPL W/O P-5'-P-CCNC: 8 U/L (ref 10–44)
ANION GAP SERPL CALC-SCNC: 10 MMOL/L (ref 8–16)
AST SERPL-CCNC: 18 U/L (ref 10–40)
BACTERIA #/AREA URNS AUTO: ABNORMAL /HPF
BACTERIA #/AREA URNS AUTO: ABNORMAL /HPF
BASOPHILS # BLD AUTO: 0.04 K/UL (ref 0–0.2)
BASOPHILS NFR BLD: 0.8 % (ref 0–1.9)
BILIRUB SERPL-MCNC: 0.8 MG/DL (ref 0.1–1)
BILIRUB UR QL STRIP: NEGATIVE
BILIRUB UR QL STRIP: NEGATIVE
BUN SERPL-MCNC: 46 MG/DL (ref 6–20)
CALCIUM SERPL-MCNC: 9.4 MG/DL (ref 8.7–10.5)
CHLORIDE SERPL-SCNC: 100 MMOL/L (ref 95–110)
CLARITY UR REFRACT.AUTO: ABNORMAL
CLARITY UR REFRACT.AUTO: ABNORMAL
CO2 SERPL-SCNC: 22 MMOL/L (ref 23–29)
COLOR UR AUTO: ABNORMAL
COLOR UR AUTO: ABNORMAL
CREAT SERPL-MCNC: 4.6 MG/DL (ref 0.5–1.4)
DIFFERENTIAL METHOD BLD: ABNORMAL
EOSINOPHIL # BLD AUTO: 0.1 K/UL (ref 0–0.5)
EOSINOPHIL NFR BLD: 1.6 % (ref 0–8)
ERYTHROCYTE [DISTWIDTH] IN BLOOD BY AUTOMATED COUNT: 18.4 % (ref 11.5–14.5)
EST. GFR  (NO RACE VARIABLE): 10.5 ML/MIN/1.73 M^2
GLUCOSE SERPL-MCNC: 252 MG/DL (ref 70–110)
GLUCOSE UR QL STRIP: ABNORMAL
GLUCOSE UR QL STRIP: ABNORMAL
HCT VFR BLD AUTO: 30.4 % (ref 37–48.5)
HGB BLD-MCNC: 9.7 G/DL (ref 12–16)
HGB UR QL STRIP: ABNORMAL
HGB UR QL STRIP: ABNORMAL
HYALINE CASTS UR QL AUTO: 0 /LPF
HYALINE CASTS UR QL AUTO: 0 /LPF
IMM GRANULOCYTES # BLD AUTO: 0.01 K/UL (ref 0–0.04)
IMM GRANULOCYTES NFR BLD AUTO: 0.2 % (ref 0–0.5)
KETONES UR QL STRIP: NEGATIVE
KETONES UR QL STRIP: NEGATIVE
LEUKOCYTE ESTERASE UR QL STRIP: ABNORMAL
LEUKOCYTE ESTERASE UR QL STRIP: ABNORMAL
LYMPHOCYTES # BLD AUTO: 0.7 K/UL (ref 1–4.8)
LYMPHOCYTES NFR BLD: 12.7 % (ref 18–48)
MAGNESIUM SERPL-MCNC: 2.2 MG/DL (ref 1.6–2.6)
MCH RBC QN AUTO: 32.7 PG (ref 27–31)
MCHC RBC AUTO-ENTMCNC: 31.9 G/DL (ref 32–36)
MCV RBC AUTO: 102 FL (ref 82–98)
MICROSCOPIC COMMENT: ABNORMAL
MICROSCOPIC COMMENT: ABNORMAL
MONOCYTES # BLD AUTO: 0.7 K/UL (ref 0.3–1)
MONOCYTES NFR BLD: 13.9 % (ref 4–15)
NEUTROPHILS # BLD AUTO: 3.6 K/UL (ref 1.8–7.7)
NEUTROPHILS NFR BLD: 70.8 % (ref 38–73)
NITRITE UR QL STRIP: NEGATIVE
NITRITE UR QL STRIP: NEGATIVE
NRBC BLD-RTO: 0 /100 WBC
OHS QRS DURATION: 82 MS
OHS QTC CALCULATION: 457 MS
PH UR STRIP: 7 [PH] (ref 5–8)
PH UR STRIP: 7 [PH] (ref 5–8)
PHOSPHATE SERPL-MCNC: 3.7 MG/DL (ref 2.7–4.5)
PLATELET # BLD AUTO: 173 K/UL (ref 150–450)
PMV BLD AUTO: 10.9 FL (ref 9.2–12.9)
POCT GLUCOSE: 164 MG/DL (ref 70–110)
POCT GLUCOSE: 252 MG/DL (ref 70–110)
POCT GLUCOSE: 267 MG/DL (ref 70–110)
POTASSIUM SERPL-SCNC: 4.7 MMOL/L (ref 3.5–5.1)
PROT SERPL-MCNC: 7.9 G/DL (ref 6–8.4)
PROT UR QL STRIP: ABNORMAL
PROT UR QL STRIP: ABNORMAL
RBC # BLD AUTO: 2.97 M/UL (ref 4–5.4)
RBC #/AREA URNS AUTO: >100 /HPF (ref 0–4)
RBC #/AREA URNS AUTO: >100 /HPF (ref 0–4)
SODIUM SERPL-SCNC: 132 MMOL/L (ref 136–145)
SP GR UR STRIP: 1.01 (ref 1–1.03)
SP GR UR STRIP: 1.01 (ref 1–1.03)
URN SPEC COLLECT METH UR: ABNORMAL
URN SPEC COLLECT METH UR: ABNORMAL
WBC # BLD AUTO: 5.11 K/UL (ref 3.9–12.7)
WBC #/AREA URNS AUTO: >100 /HPF (ref 0–5)
WBC #/AREA URNS AUTO: >100 /HPF (ref 0–5)
WBC CLUMPS UR QL AUTO: ABNORMAL
WBC CLUMPS UR QL AUTO: ABNORMAL

## 2024-06-26 PROCEDURE — 84100 ASSAY OF PHOSPHORUS: CPT | Mod: HCNC

## 2024-06-26 PROCEDURE — 83735 ASSAY OF MAGNESIUM: CPT | Mod: HCNC

## 2024-06-26 PROCEDURE — 11000001 HC ACUTE MED/SURG PRIVATE ROOM: Mod: HCNC

## 2024-06-26 PROCEDURE — 87088 URINE BACTERIA CULTURE: CPT | Mod: HCNC | Performed by: PHYSICIAN ASSISTANT

## 2024-06-26 PROCEDURE — 93010 ELECTROCARDIOGRAM REPORT: CPT | Mod: HCNC,,, | Performed by: INTERNAL MEDICINE

## 2024-06-26 PROCEDURE — 80100014 HC HEMODIALYSIS 1:1: Mod: HCNC

## 2024-06-26 PROCEDURE — 25000003 PHARM REV CODE 250: Mod: HCNC

## 2024-06-26 PROCEDURE — 80053 COMPREHEN METABOLIC PANEL: CPT | Mod: HCNC

## 2024-06-26 PROCEDURE — 25000003 PHARM REV CODE 250: Mod: HCNC | Performed by: NURSE PRACTITIONER

## 2024-06-26 PROCEDURE — 81001 URINALYSIS AUTO W/SCOPE: CPT | Mod: 91,HCNC | Performed by: PHYSICIAN ASSISTANT

## 2024-06-26 PROCEDURE — 90935 HEMODIALYSIS ONE EVALUATION: CPT | Mod: HCNC,,, | Performed by: NURSE PRACTITIONER

## 2024-06-26 PROCEDURE — 87106 FUNGI IDENTIFICATION YEAST: CPT | Mod: HCNC | Performed by: PHYSICIAN ASSISTANT

## 2024-06-26 PROCEDURE — 87086 URINE CULTURE/COLONY COUNT: CPT | Mod: HCNC

## 2024-06-26 PROCEDURE — 99233 SBSQ HOSP IP/OBS HIGH 50: CPT | Mod: HCNC,,, | Performed by: INTERNAL MEDICINE

## 2024-06-26 PROCEDURE — 99222 1ST HOSP IP/OBS MODERATE 55: CPT | Mod: HCNC,,, | Performed by: PSYCHIATRY & NEUROLOGY

## 2024-06-26 PROCEDURE — 87086 URINE CULTURE/COLONY COUNT: CPT | Mod: 59,HCNC | Performed by: PHYSICIAN ASSISTANT

## 2024-06-26 PROCEDURE — 36415 COLL VENOUS BLD VENIPUNCTURE: CPT | Mod: HCNC

## 2024-06-26 PROCEDURE — 85025 COMPLETE CBC W/AUTO DIFF WBC: CPT | Mod: HCNC

## 2024-06-26 PROCEDURE — 93005 ELECTROCARDIOGRAM TRACING: CPT | Mod: HCNC

## 2024-06-26 PROCEDURE — 81001 URINALYSIS AUTO W/SCOPE: CPT | Mod: HCNC

## 2024-06-26 PROCEDURE — 63600175 PHARM REV CODE 636 W HCPCS: Mod: HCNC | Performed by: NURSE PRACTITIONER

## 2024-06-26 RX ORDER — HYDROCORTISONE 25 MG/G
CREAM TOPICAL 2 TIMES DAILY PRN
Status: DISCONTINUED | OUTPATIENT
Start: 2024-06-26 | End: 2024-07-05 | Stop reason: HOSPADM

## 2024-06-26 RX ORDER — SERTRALINE HYDROCHLORIDE 100 MG/1
100 TABLET, FILM COATED ORAL DAILY
Status: DISCONTINUED | OUTPATIENT
Start: 2024-06-26 | End: 2024-07-05 | Stop reason: HOSPADM

## 2024-06-26 RX ADMIN — APIXABAN 5 MG: 5 TABLET, FILM COATED ORAL at 08:06

## 2024-06-26 RX ADMIN — GABAPENTIN 100 MG: 100 CAPSULE ORAL at 09:06

## 2024-06-26 RX ADMIN — INSULIN ASPART 3 UNITS: 100 INJECTION, SOLUTION INTRAVENOUS; SUBCUTANEOUS at 08:06

## 2024-06-26 RX ADMIN — CALCITRIOL 0.5 MCG: 0.5 CAPSULE, LIQUID FILLED ORAL at 08:06

## 2024-06-26 RX ADMIN — INSULIN ASPART 1 UNITS: 100 INJECTION, SOLUTION INTRAVENOUS; SUBCUTANEOUS at 10:06

## 2024-06-26 RX ADMIN — APIXABAN 5 MG: 5 TABLET, FILM COATED ORAL at 09:06

## 2024-06-26 RX ADMIN — SERTRALINE 100 MG: 100 TABLET, FILM COATED ORAL at 02:06

## 2024-06-26 RX ADMIN — TRAZODONE HYDROCHLORIDE 25 MG: 50 TABLET ORAL at 09:06

## 2024-06-26 RX ADMIN — FUROSEMIDE 40 MG: 40 TABLET ORAL at 08:06

## 2024-06-26 RX ADMIN — ALLOPURINOL 50 MG: 300 TABLET ORAL at 08:06

## 2024-06-26 RX ADMIN — FAMOTIDINE 20 MG: 20 TABLET ORAL at 08:06

## 2024-06-26 RX ADMIN — CLOPIDOGREL BISULFATE 75 MG: 75 TABLET ORAL at 08:06

## 2024-06-26 RX ADMIN — ATORVASTATIN CALCIUM 80 MG: 40 TABLET, FILM COATED ORAL at 08:06

## 2024-06-26 RX ADMIN — GABAPENTIN 100 MG: 100 CAPSULE ORAL at 08:06

## 2024-06-26 RX ADMIN — CARVEDILOL 6.25 MG: 6.25 TABLET, FILM COATED ORAL at 08:06

## 2024-06-26 RX ADMIN — INSULIN ASPART 3 UNITS: 100 INJECTION, SOLUTION INTRAVENOUS; SUBCUTANEOUS at 05:06

## 2024-06-26 RX ADMIN — CALCIUM ACETATE 667 MG: 667 CAPSULE ORAL at 08:06

## 2024-06-26 RX ADMIN — HYDRALAZINE HYDROCHLORIDE 50 MG: 50 TABLET ORAL at 09:06

## 2024-06-26 RX ADMIN — CALCIUM ACETATE 667 MG: 667 CAPSULE ORAL at 05:06

## 2024-06-26 RX ADMIN — HYDRALAZINE HYDROCHLORIDE 50 MG: 50 TABLET ORAL at 08:06

## 2024-06-26 RX ADMIN — CARVEDILOL 6.25 MG: 6.25 TABLET, FILM COATED ORAL at 09:06

## 2024-06-26 RX ADMIN — DIPHENHYDRAMINE HYDROCHLORIDE 25 MG: 25 CAPSULE ORAL at 03:06

## 2024-06-26 NOTE — CONSULTS
[facility] - [facility] 11[facility]  Wound Care    Patient Name:  Suyapa Connelly   MRN:  [id number]  Date: 6/26/2024  Diagnosis: Acute encephalopathy    History:     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    Osteomyelitis     Osteomyelitis of left foot 04/30/2021    Ulcer of left foot     Vaginal delivery     x1       Social History     Socioeconomic History    Marital status:    Occupational History    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      Social Determinants of Health     Financial Resource Strain: Patient Declined (6/22/2024)    Overall Financial Resource Strain (CARDIA)     Difficulty of Paying Living Expenses: Patient declined   Food Insecurity: Patient Declined (6/22/2024)    Hunger Vital Sign     Worried About Running Out of Food in the Last Year: Patient declined     Ran Out of Food in the Last Year: Patient declined   Transportation Needs: No Transportation Needs (6/22/2024)    TRANSPORTATION NEEDS     Transportation : No   Physical Activity: Inactive (6/22/2024)    Exercise Vital Sign     Days of Exercise per Week: 0 days     Minutes of Exercise per Session: 0 min   Stress: Patient Declined (6/22/2024)    [employer] - Occupational Stress Questionnaire     Feeling of Stress : Patient declined   Housing Stability: Patient Declined (6/22/2024)    Housing Stability Vital Sign     Unable to Pay for Housing in the Last Year: Patient declined      Homeless in the Last Year: Patient declined       Precautions:     Allergies as of 06/21/2024 - Reviewed 06/21/2024   Allergen Reaction Noted    Ciprofloxacin Itching 07/18/2020    Iodine  01/10/2020    Pcn [penicillins]  11/14/2014       [facility] Assessment Details/Treatment   Pt seen for wound consult- sacrum/buttocks. Pt sitting upright in bed, eating dinner. Pt was agreeable to wound assessment and able to lean forward independently. See assessment below; left buttock/sacral wound is dried and scabbed and intergluteal cleft presents as dried skin. Pt had a sacral silicone border foam in place; this was lifted and reapplied. Pt educated on topical cream to use for moisture/barrier protection.     Recommendations: Clean buttock/sacral area with bath wipes or soap/water; apply zinc barrier cream.    Malia Hartley RN/JOSE A  06/26/2024 06/26/24 1705        Wound 06/12/24 0054 Moisture associated dermatitis Sacral spine   Date First Assessed/Time First Assessed: 06/12/24 0054   Present on Original Admission: Yes  Primary Wound Type: Moisture associated dermatitis  Location: Sacral spine   Dressing Appearance Dry;Intact   Drainage Amount None   Drainage Characteristics/Odor No odor   Appearance Pink;Other (see comments)  (dry skin)   Periwound Area Dry   Wound Edges Irregular   Care Cleansed with:;Applied:;Skin Barrier  (bath wipes)   Dressing Foam;Silicone      06/26/24 1719   WOCN Assessment   WOCN Total Time (mins) 30   Visit Date 06/26/24   Visit Time 1705   Consult Type New   [facility] Speciality Wound   Wound moisture;shearing   Number of Wounds 2   Intervention assessed;applied   Teaching on-going   Skin Interventions   Pressure Reduction Devices foam padding utilized   Skin Protection silicone foam dressing in place;incontinence pads utilized;skin sealant/moisture barrier applied      06/26/24 1700        Wound 05/09/24 1200 Pressure Injury Left Buttocks   Date First Assessed/Time First Assessed: 05/09/24 1200    Present on Original Admission: Yes  Primary Wound Type: Pressure Injury  Side: Left  Location: Buttocks   Dressing Appearance Open to air;Dry;No dressing   Drainage Amount None   Drainage Characteristics/Odor No odor   Appearance Pink;Tan;Dry;Closed/resurfaced;Epithelialization  (dried scab/skin-no open area)   Periwound Area Dry;Denuded   Wound Edges Rolled/closed   Wound Length (cm) 1 cm   Wound Width (cm) 1 cm   Wound Depth (cm) 0.2 cm   Wound Volume (cm^3) 0.2 cm^3   Wound Surface Area (cm^2) 1 cm^2   Care Cleansed with:;Other (see comments);Applied:;Skin Barrier   Dressing Applied  (zinc barrier cream)

## 2024-06-26 NOTE — PROGRESS NOTES
[facility] - [facility]  Infectious Disease  Progress Note    Patient Name: Suyapa Connelly  MRN: [id number]  Admission Date: 6/21/2024  Length of Stay: 4 days  Attending Physician: Frances Bee MD  Primary Care Provider: Feliciano Nguyen III, PA-C    Isolation Status: No active isolations  Assessment/Plan:      Renal/  Acute cystitis without hematuria  59 yo female with BL AKAs, ESRD on HD and other comorbidities admitted with lethargy, anorexia and confusion.  CTH negative for acute findings.  UA + and Ucx with 10-49k CFU C Krusei.  Abd exam and history not cw UTI as cause of cognitive findings/condition but concern for abd process and other etiology causing current condition. Hep C and HIV negative.  CT A/P without acute findings - constipation noted - ? Source of adb pain.      Fully oriented today. Denies any complaints.  Patient deemed to have no infectious cause of her AMS as she has normalized without antimicrobials.        Plan:  1. Monitor clinically  2. Rec laxatives for constipation  3. Abx and antifungals not recommended  4. Seen with ID staff   5. Messaged with primary team, Viry Griffin PA-C  6. Will sign off.        Anticipated Disposition: tbd    Thank you for your consult. I will sign off. Please contact us if you have any additional questions.    VENANCIO Green  Infectious Disease  [facility] - [facility]    Subjective:     Principal Problem:Acute encephalopathy    HPI: Intake history taken from chart as patient unable to provide but details reviewed and confirmed with :     Suyapa Connelly is a 58 y.o. female with a PMHx of HTN, GERD, HLD, PVD, ESRD on HD MFW, b/l AKAs, CAD s/p CABG, afib on eliquis, depression, and anxiety who presented to the ED 6/21 for evaluation of confusion. HPI and ROS limited secondary to patient's mental status and poor participation. Per patient's  she has been more sleepy and less interactive over the past several days prior to admit.  He reported that her urine has been foul-smelling and more cloudy. He stated she was also peeing more frequently in complaining of burning with urination and some occasional suprapubic pain. Denied any fevers or chills. The patient denied CP, SOB, abdominal pain, or dysuria. Per  patient was dialyzed today a full dialysis and they came straight here after.      In the ED, VSS, afebrile. CBC with stable leukopenia and anemia. Na 129, Chloride 94, Bicarb 22, Glucose 50. POC glucose 150. Cr 2.7 (consistent with ESRD). Albumin 3.2. Tbili 1.1. Mag 2.1. UA with 3+ leukocytosis, 74 WBCs, but no bacteria. CTH with no acute finding or detrimental change when compared with 06/11/2024, allowing for motion and artifact limitations. The patient received 1g IV Rocephin.    Interval history:  Patient admitted and received 2 days of rocephin but has been off last 2 days.  Urine culture showed 10-49k CFU of C krusei but no bacterial growth.  ID consulted for candida uti.  Patient seen and is confused on exam.  Currently denies dysuria but admits to abd pain.  reports she uses a Purewick at home and though she is on HD, she still produces some urine daily. The patient denies any recent fever, chills, or sweats.    Interval History:   No acute events  Afebrile and WBC 3.79  Doing better  No complaints  The patient denies any recent dysuria, pain, fever, chills, or sweats.      Review of Systems   Constitutional:  Negative for activity change, chills, diaphoresis and fever.   Respiratory:  Negative for cough, shortness of breath and wheezing.    Cardiovascular:  Negative for chest pain.   Gastrointestinal:  Positive for abdominal pain. Negative for constipation, diarrhea, nausea and vomiting.   Genitourinary:  Negative for dysuria, frequency and urgency.   Neurological:  Negative for dizziness.   Hematological:  Does not bruise/bleed easily.     Objective:     Vital Signs (Most Recent):  Temp: 98 °F (36.7 °C) (06/26/24  0728)  Pulse: 78 (06/26/24 1215)  Resp: 16 (06/26/24 1215)  BP: (!) 107/55 (06/26/24 1220)  SpO2: 98 % (06/26/24 1145) Vital Signs (24h Range):  Temp:  [97.5 °F (36.4 °C)-98.2 °F (36.8 °C)] 98 °F (36.7 °C)  Pulse:  [78-86] 78  Resp:  [14-20] 16  SpO2:  [94 %-100 %] 98 %  BP: (107-182)/(51-83) 107/55     Weight: 74.2 kg (163 lb 9.3 oz)  Body mass index is 49.92 kg/m².    Estimated Creatinine Clearance: 10.5 mL/min (A) (based on SCr of 4.6 mg/dL (H)).     Physical Exam  Constitutional:       General: She is not in acute distress.     Appearance: She is well-developed. She is obese. She is ill-appearing. She is not toxic-appearing or diaphoretic.       HENT:      Head: Normocephalic and atraumatic.   Cardiovascular:      Rate and Rhythm: Normal rate and regular rhythm.      Heart sounds: Normal heart sounds. No murmur heard.     No friction rub. No gallop.   Pulmonary:      Effort: Pulmonary effort is normal. No respiratory distress.      Breath sounds: Normal breath sounds. No wheezing or rales.   Abdominal:      General: Bowel sounds are normal. There is no distension.      Palpations: Abdomen is soft. There is no mass.      Tenderness: There is abdominal tenderness (much improved) in the periumbilical area and left upper quadrant. There is no guarding or rebound.       Skin:     General: Skin is warm and dry.   Neurological:      Mental Status: She is alert and oriented to person, place, and time.   Psychiatric:         Behavior: Behavior normal.          Significant Labs: Blood Culture:   Recent Labs   Lab 02/04/24  1705 05/07/24  2104 06/12/24  0949 06/12/24  0954   LABBLOO No growth after 5 days.  No growth after 5 days. No growth after 5 days.  No growth after 5 days. No growth after 5 days. No growth after 5 days.     CBC:   Recent Labs   Lab 06/25/24  0904 06/26/24  0837   WBC 3.79* 5.11   HGB 9.6* 9.7*   HCT 29.2* 30.4*    173     CMP:   Recent Labs   Lab 06/25/24  0904 06/26/24  0837   * 132*    K 4.3 4.7    100   CO2 22* 22*   * 252*   BUN 32* 46*   CREATININE 3.5* 4.6*   CALCIUM 9.5 9.4   PROT 7.9 7.9   ALBUMIN 3.0* 3.0*   BILITOT 0.8 0.8   ALKPHOS 127 171*   AST 17 18   ALT 10 8*   ANIONGAP 11 10     Urine Culture:   Recent Labs   Lab 05/07/24 2223 05/29/24  0118 06/21/24 2027   LABURIN ESCHERICHIA COLI  > 100,000 cfu/ml  * Multiple organisms isolated. None in predominance.  Repeat if  clinically necessary. ELIZABETH KRUSEI  10,000 - 49,999 cfu/ml  Treatment of asymptomatic candiduria is not recommended (except for   specific populations). Candida isolated in the urine typically   represents colonization. If an indwelling urinary catheter is present  it should be removed or replaced.  *     Urine Studies:   Recent Labs   Lab 06/21/24 2027 06/26/24  0437   COLORU Yellow  Yellow Orange*  Orange*   APPEARANCEUA Clear  Clear Cloudy*  Cloudy*   PHUR 6.0  6.0 7.0  7.0   SPECGRAV 1.020  1.020 1.015  1.015   PROTEINUA 3+*  3+* 2+*  2+*   GLUCUA Negative  Negative 2+*  2+*   KETONESU Negative  Negative Negative  Negative   BILIRUBINUA Negative  Negative Negative  Negative   OCCULTUA 3+*  3+* 3+*  3+*   NITRITE Negative  Negative Negative  Negative   LEUKOCYTESUR 3+*  3+* 3+*  3+*   RBCUA 14* >100*  >100*   WBCUA 74* >100*  >100*   BACTERIA None Rare  Rare   SQUAMEPITHEL 1  --    HYALINECASTS 0 0  0     All pertinent labs within the past 24 hours have been reviewed.    Significant Imaging: I have reviewed all pertinent imaging results/findings within the past 24 hours.  CT Abdomen Pelvis Without Contrast [[id number]] Resulted: 06/25/24 1124   Order Status: Sent Updated: 06/25/24 1145   X-Ray Chest 1 View [[id number]] Resulted: 06/22/24 0054   Order Status: Completed Updated: 06/22/24 0056   Narrative:     EXAMINATION:  XR CHEST 1 VIEW    CLINICAL HISTORY:  infectious work up;    TECHNIQUE:  Single frontal view of the chest was  performed.    COMPARISON:  06/11/2024    FINDINGS:  Cardiac monitoring leads overlie the chest.  There is a stably positioned left-sided central venous catheter in place.  Postoperative change of median sternotomy.  There is unchanged enlargement of the cardiomediastinal silhouette noting atherosclerosis of the thoracic aorta.  The lungs are symmetrically expanded with diffuse increased interstitial and parenchymal attenuation similar to prior study.  Findings can be seen with interstitial edema or infectious process.  Clinical correlation advised.  No large region of confluent airspace consolidation or substantial volume of pleural fluid identified.  Osseous structures are intact.   Impression:       Please see above.      Electronically signed by: Monik Lara MD  Date: 06/22/2024  Time: 00:54   CT Head Without Contrast [[id number]] Resulted: 06/21/24 2123   Order Status: Completed Updated: 06/21/24 2125   Narrative:     EXAMINATION:  CT HEAD WITHOUT CONTRAST    CLINICAL HISTORY:  Mental status change, unknown cause;    TECHNIQUE:  Low dose axial images were obtained through the head.  Coronal and sagittal reformations were also performed. Contrast was not administered.    COMPARISON:  06/11/2024.    FINDINGS:  Exam quality is limited by motion and streak artifact.    Generalized cerebral volume loss and chronic ischemic changes as seen previously.    No evidence of acute territorial infarct, hemorrhage, mass effect, or midline shift.    Ventricles are prominent and stable when compared with the prior study.  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    No displaced calvarial fracture.    Visualized paranasal sinuses and mastoid air cells are essentially clear.   Impression:       No acute finding or detrimental change when compared with 06/11/2024, allowing for motion and artifact limitations.    Other findings discussed in the body of  the report.      Electronically signed by: Lino Peter MD  Date: 06/21/2024  Time: 21:23      Imaging History     2024    Date Procedure Name Study Review Link PACS Link Status Accession Number Location   06/25/24 11:45 AM CT Abdomen Pelvis  Without Contrast Study Review  Images Exam Ended [id number] [facility]   06/22/24 05:39 AM Cardiac monitoring strips Study Review  Final     06/21/24 11:33 PM X-Ray Chest 1 View Study Review  Images Final [id number] [facility]   06/21/24 08:52 PM CT Head Without Contrast Study Review  Images Final [id number] [facility]   06/12/24 04:40 AM Cardiac monitoring strips Study Review  Final     06/11/24 11:59 PM X-Ray Hip 2 or 3 views Right with Pelvis when performed Study Review  Images Final [id number] [facility]   06/11/24 11:59 PM X-Ray Chest AP Portable Study Review  Images Final [id number] [facility]   06/11/24 10:25 PM CT Cervical Spine Without Contrast Study Review  Images Final [id number] [facility]   06/11/24 10:25 PM CT Maxillofacial Without Contrast Study Review  Images Final [id number] [facility]   06/11/24 10:25 PM CT Head Without Contrast Study Review  Images Final [id number] [facility]   06/03/24 10:50 AM Cardiac monitoring strips Study Review  Final     06/02/24 07:52 PM Cardiac monitoring strips Study Review  Final     06/01/24 10:37 PM Cardiac monitoring strips Study Review  Final     06/01/24 03:03 PM Cardiac monitoring strips Study Review  Final     05/30/24 02:49 PM Cardiac monitoring strips Study Review  Final     05/29/24 09:47 PM MRI Brain Without Contrast Study Review  Images Final [id number] [facility]   05/29/24 05:23 AM Cardiac monitoring strips Study Review  Final     05/28/24 09:29 PM CTA STROKE MULTI-PHASE Study Review  Images Final [id number] [facility]

## 2024-06-26 NOTE — ASSESSMENT & PLAN NOTE
Patient's FSGs are not controlled on current hypoglycemics due to hypoglycemia.   Last A1c reviewed-   Lab Results   Component Value Date    HGBA1C 8.0 (H) 04/24/2024     Most recent fingerstick glucose reviewed-   Recent Labs   Lab 06/25/24  1658 06/25/24  2053 06/26/24  0800   POCTGLUCOSE 207* 233* 267*       Current correctional scale  Low  Maintain anti-hyperglycemic dose as follows-   Antihyperglycemics (From admission, onward)    Start     Stop Route Frequency Ordered    06/24/24 1645  insulin aspart U-100 pen 3 Units         -- SubQ 3 times daily with meals 06/24/24 1335    06/22/24 0032  insulin aspart U-100 pen 0-5 Units         -- SubQ Before meals & nightly PRN 06/21/24 2332      -Pt initially hypoglycemic which improved with D10 bolus.   -Accuchecks AC/HS

## 2024-06-26 NOTE — ASSESSMENT & PLAN NOTE
"Patient is identified as having Combined Systolic and Diastolic heart failure that is Chronic. CHF is currently controlled. Latest ECHO performed and demonstrates- Results for orders placed during the hospital encounter of 05/07/24    Echo    Interpretation Summary    Left Ventricle: The left ventricle is mildly dilated. Ventricular mass is normal. Normal wall thickness. Severe global hypokinesis present. There is severely reduced systolic function. Ejection fraction by visual approximation is 15%. There is diastolic dysfunction.    Right Ventricle: Severe right ventricular enlargement. Wall thickness is normal. Right ventricle wall motion has global hypokinesis. Systolic function is mildly reduced.    Left Atrium: Left atrium is severely dilated. There is an atrial septal aneurysm.    Right Atrium: Right atrium is dilated.    Aortic Valve: There is mild aortic regurgitation.    Mitral Valve: There is mild regurgitation.    Tricuspid Valve: There is annular dilation present. There is normal leaflet mobility. There is severe functional regurgitation.    Pulmonary Artery: The estimated pulmonary artery systolic pressure is at least 24 mmHg. PASP is likely under-estimated in the setting of severe tricuspid regurgitation.    IVC/SVC: Central venous pressure of at least 8 mmHg.    No vegetation seen.  . Continue Beta Blocker, lasix and monitor clinical status closely. Monitor on telemetry. Patient is off CHF pathway.  Monitor strict Is&Os and daily weights.  Place on fluid restriction of 1.5 L. Continue to stress to patient importance of self efficacy and  on diet for CHF. Last BNP reviewed- and noted below No results for input(s): "BNP", "BNPTRIAGEBLO" in the last 168 hours..  -Volume management with HD.    "

## 2024-06-26 NOTE — ASSESSMENT & PLAN NOTE
-Pt denies any abd pain, dysuria, urinary frequency, or fever/chills. Per  patient's urine has been foul smelling and cloudy. Per  patient has also been complaining of suprapubic pain and dysuria at home.  -Afebrile, WBC 3.80 (chronic leukopenia).  -UA reviewed, follow urine cx.  -Pt received IV rocephin in the ED, continue for now.  -culture with candida krusei, ID consulted for recommendations   - obtain CT abd/pelvis - no acute findings found for pain symptoms   - repeat UA infectious, Ucx pending

## 2024-06-26 NOTE — PROGRESS NOTES
Attempted to see pt for wound care consult; pt currently on dialysis at bedside. Will return later.     Malia Hartley RN/CWON

## 2024-06-26 NOTE — PROGRESS NOTES
[facility] [address] [place] [address] Medicine  Progress Note    Patient Name: Suyapa Connelly  MRN: [id number]  Patient Class: IP- Inpatient   Admission Date: 6/21/2024  Length of Stay: 4 days  Attending Physician: Frances Bee MD  Primary Care Provider: Feliciano Nguyen III, PA-C        Subjective:     Principal Problem:Acute encephalopathy        HPI:  Suyapa Connelly is a 58 y.o. female with a PMHx of HTN, GERD, HLD, PVD, ESRD on HD MFW, b/l AKAs, CAD s/p CABG, afib on eliquis, depression, and anxiety who presents to the ED for evaluation of confusion. HPI and ROS limited secondary to patient's mental status and poor participation. Per patient's  she has been more sleepy and less interactive over the past several days. He reports that her urine has been foul-smelling and more cloudy. He states she was also peeing more frequently in complaining of burning with urination and some occasional suprapubic pain. Denies any fevers or chills. The patient denies CP, SOB, abdominal pain, or dysuria. Per  patient was dialyzed today a full dialysis and they came straight here after.     In the ED, VSS, afebrile. CBC with stable leukopenia and anemia. Na 129, Chloride 94, Bicarb 22, Glucose 50. POC glucose 150. Cr 2.7 (consistent with ESRD). Albumin 3.2. Tbili 1.1. Mag 2.1. UA with 3+ leukocytosis, 74 WBCs, but no bacteria. CTH with no acute finding or detrimental change when compared with 06/11/2024, allowing for motion and artifact limitations. The patient received 1g IV Rocephin.    Overview/Hospital Course:  Suyapa Connelly is a 58 y.o. F who was admitted to  for further evaluation of acute encephalopathy. Most likely 2/2 UTI, other workup negative. AAOx3 now. Initiated rocephin for infectious appearing UA. Following culture - grew Candida krusei, stopped rocephin. ID consulted for recommendations. Recommend deferring treatment, obtain CT abd/pelvis. CT with findings of volume overload, otherwise no  acute intraabdominal abnormality. Given x1 120mg IV lasix. Repeating UA with straight cath and following cx per ID recs. Nephrology following for HD management: HD performed 6/24 and 6/26. Psych consulted for recommendations: continue zoloft, add trazodone qhs. CM assisting with discharge planning.       Interval History: Patient seen and assessed at bedside. Afebrile, VSS. HD performed today at bedside. ID recommending UA repeated with straight cath, Ucx pending. Patient without complaints this am - AAOx4. CM assisting with discharge planning.       Review of Systems   Respiratory:  Negative for shortness of breath.    Cardiovascular:  Negative for chest pain.   Gastrointestinal:  Negative for nausea.   Genitourinary:  Negative for frequency.   Musculoskeletal:  Positive for arthralgias and back pain.     Objective:     Vital Signs (Most Recent):  Temp: 98 °F (36.7 °C) (06/26/24 0728)  Pulse: 78 (06/26/24 1215)  Resp: 16 (06/26/24 1215)  BP: (!) 107/55 (06/26/24 1220)  SpO2: 98 % (06/26/24 1145) Vital Signs (24h Range):  Temp:  [97.5 °F (36.4 °C)-98.2 °F (36.8 °C)] 98 °F (36.7 °C)  Pulse:  [78-86] 78  Resp:  [14-20] 16  SpO2:  [94 %-100 %] 98 %  BP: (107-182)/(51-83) 107/55     Weight: 74.2 kg (163 lb 9.3 oz)  Body mass index is 49.92 kg/m².    Intake/Output Summary (Last 24 hours) at 6/26/2024 1428  Last data filed at 6/26/2024 1220  Gross per 24 hour   Intake --   Output 3000 ml   Net -3000 ml         Physical Exam  Vitals and nursing note reviewed.   Constitutional:       Appearance: She is not toxic-appearing or diaphoretic.      Comments: Chronically ill appearing   HENT:      Head: Normocephalic and atraumatic.      Nose: Nose normal.      Mouth/Throat:      Mouth: Mucous membranes are moist.   Eyes:      Pupils: Pupils are equal, round, and reactive to light.   Cardiovascular:      Rate and Rhythm: Normal rate and regular rhythm.      Pulses: Normal pulses.   Pulmonary:      Effort: Pulmonary effort is  normal. No respiratory distress.      Breath sounds: No wheezing, rhonchi or rales.   Abdominal:      General: Bowel sounds are normal. There is no distension.      Palpations: Abdomen is soft.      Tenderness: There is no abdominal tenderness. There is no guarding.   Musculoskeletal:         General: Normal range of motion.      Cervical back: Normal range of motion.      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      Left Lower Extremity: Left leg is amputated above knee.   Skin:     General: Skin is warm and dry.      Capillary Refill: Capillary refill takes less than 2 seconds.   Neurological:      General: No focal deficit present.      Mental Status: She is alert. Mental status is at baseline.      Cranial Nerves: No facial asymmetry.      Motor: No tremor or seizure activity.      Comments: AAOx4 - patient more alert and participatory with exam this morning.    Psychiatric:         Behavior: Behavior is cooperative.             Significant Labs: All pertinent labs within the past 24 hours have been reviewed.    Significant Imaging: I have reviewed all pertinent imaging results/findings within the past 24 hours.    Assessment/Plan:      * Acute encephalopathy  Patient has acute metabolic encephalopathy that likely secondary to UTI.Treatment for underlying cause is underway. Pt with recurrent admissions for encephalopathy with extensive work ups including EEG, CTH, MRI brain, and vitamin panels. Will monitor neuro status carefully, avoid narcotics and benzos that will exacerbate agitation, and use PRN anti-psychotics for controls of behavior for self harm.  -Initially hypoglycemic which improved with treatment. Na 129.  -UA concerning for infection.  -ucx with candida krusei, ID consulted for treatment recommendations:   - obtain CT abd/pelvis - no acute intraabdominal abnormality    - repeat UA from straight cath remains infectious appearing, follow culture  -CXR with diffuse increased  interstitial and parenchymal attenuation similar to prior study. No large region of confluent airspace consolidation or substantial volume of pleural fluid identified.   -CTH-negative  -Delirium precautions   - reports progressive decline in patient over the last few months. Initiated NH paperwork for patient. CM notified for assistance     Severe obesity (BMI >= 40)  Body mass index is 49.92 kg/m². Morbid obesity complicates all aspects of disease management from diagnostic modalities to treatment.     ESRD (end stage renal disease)  Chronic kidney disease-mineral and bone disorder   MWF schedule, last dialyzed 6/26  Residual renal function?- Yes    - Nephrology consulted  - Continue chronic hemodialysis  - Monitor daily electrolytes and defer dialysis orders to nephrology  - Renally dose medications  - Renal diet  - Continue phosphorus binders  - Daily weights/strict I&Os  - 1.5L FR    Debility  Patient with Chronic debility due to other reduced mobility. Latest AMPAC and GEMS scores have not been reviewed. Plan includes progressive mobility protocol initated and fall precautions in place.  - family initiated NH paperwork  - CM notified    S/P AKA (above knee amputation) bilateral  History noted.  -Fall precautions.    Chronic combined systolic and diastolic heart failure  Patient is identified as having Combined Systolic and Diastolic heart failure that is Chronic. CHF is currently controlled. Latest ECHO performed and demonstrates- Results for orders placed during the hospital encounter of 05/07/24    Echo    Interpretation Summary    Left Ventricle: The left ventricle is mildly dilated. Ventricular mass is normal. Normal wall thickness. Severe global hypokinesis present. There is severely reduced systolic function. Ejection fraction by visual approximation is 15%. There is diastolic dysfunction.    Right Ventricle: Severe right ventricular enlargement. Wall thickness is normal. Right ventricle wall  "motion has global hypokinesis. Systolic function is mildly reduced.    Left Atrium: Left atrium is severely dilated. There is an atrial septal aneurysm.    Right Atrium: Right atrium is dilated.    Aortic Valve: There is mild aortic regurgitation.    Mitral Valve: There is mild regurgitation.    Tricuspid Valve: There is annular dilation present. There is normal leaflet mobility. There is severe functional regurgitation.    Pulmonary Artery: The estimated pulmonary artery systolic pressure is at least 24 mmHg. PASP is likely under-estimated in the setting of severe tricuspid regurgitation.    IVC/SVC: Central venous pressure of at least 8 mmHg.    No vegetation seen.  . Continue Beta Blocker, lasix and monitor clinical status closely. Monitor on telemetry. Patient is off CHF pathway.  Monitor strict Is&Os and daily weights.  Place on fluid restriction of 1.5 L. Continue to stress to patient importance of self efficacy and  on diet for CHF. Last BNP reviewed- and noted below No results for input(s): "BNP", "BNPTRIAGEBLO" in the last 168 hours..  -Volume management with HD.      Hyponatremia  Patient has hyponatremia which is uncontrolled,We will aim to correct the sodium by 4-6mEq in 24 hours. We will monitor sodium Daily. The hyponatremia is due to Medications: SSRIs and renal insufficiency. We will obtain the following studies: Urine sodium, urine osmolality, serum osmolality. We will treat the hyponatremia with Hemodialysis and Removal of offending medications. The patient's sodium results have been reviewed and are listed below.  Recent Labs   Lab 06/26/24  0837   *     - patient with chronic hyponatremia most likely 2/2 ESRD  - hyperglycemic, corrected Na 134    Paroxysmal atrial fibrillation  Patient with Paroxysmal (<7 days) atrial fibrillation which is controlled currently with Beta Blocker. Patient is currently in sinus rhythm.[id number] Score: 3. Anticoagulation indicated. Anticoagulation done " with eliquis .    Anemia due to chronic kidney disease, on chronic dialysis  Patient's anemia is currently controlled. Has not received any PRBCs to date. Etiology likely d/t chronic disease due to ESRD  Current CBC reviewed-   Lab Results   Component Value Date    HGB 9.7 (L) 06/26/2024    HCT 30.4 (L) 06/26/2024     Monitor serial CBC and transfuse if patient becomes hemodynamically unstable, symptomatic or H/H drops below 7/21.    CAD (coronary artery disease)  Patient with known CAD s/p CABG, which is controlled Will continue Plavix and Statin and monitor for S/Sx of angina/ACS. Continue to monitor on telemetry.     Type 2 diabetes mellitus with hyperglycemia, with long-term current use of insulin  Patient's FSGs are not controlled on current hypoglycemics due to hypoglycemia.   Last A1c reviewed-   Lab Results   Component Value Date    HGBA1C 8.0 (H) 04/24/2024     Most recent fingerstick glucose reviewed-   Recent Labs   Lab 06/25/24  1658 06/25/24  2053 06/26/24  0800   POCTGLUCOSE 207* 233* 267*       Current correctional scale  Low  Maintain anti-hyperglycemic dose as follows-   Antihyperglycemics (From admission, onward)      Start     Stop Route Frequency Ordered    06/24/24 1645  insulin aspart U-100 pen 3 Units         -- SubQ 3 times daily with meals 06/24/24 1335    06/22/24 0032  insulin aspart U-100 pen 0-5 Units         -- SubQ Before meals & nightly PRN 06/21/24 2332        -Pt initially hypoglycemic which improved with D10 bolus.   -Accuchecks AC/HS    Acute cystitis without hematuria  -Pt denies any abd pain, dysuria, urinary frequency, or fever/chills. Per  patient's urine has been foul smelling and cloudy. Per  patient has also been complaining of suprapubic pain and dysuria at home.  -Afebrile, WBC 3.80 (chronic leukopenia).  -UA reviewed, follow urine cx.  -Pt received IV rocephin in the ED, continue for now.  -culture with candida krusei, ID consulted for recommendations   -  obtain CT abd/pelvis - no acute findings found for pain symptoms   - repeat UA infectious, Ucx pending    CAROLIN (generalized anxiety disorder)  -Chronic, uncontrolled.  -Hold zoloft for now pending hyponatremia work up.   -Can consider psychiatry consult - consult placed:   - recommend continuing zoloft, trazadone 25mg qhs    Peripheral vascular disease  -Chronic issue.  -Continue plavix and statin.      VTE Risk Mitigation (From admission, onward)           Ordered     heparin (porcine) injection 1,000 Units  As needed (PRN)         06/24/24 1001     apixaban tablet 5 mg  2 times daily         06/21/24 2332     IP VTE HIGH RISK PATIENT  Once         06/21/24 2332     Reason for No Pharmacological VTE Prophylaxis  Once        Question:  Reasons:  Answer:  Already adequately anticoagulated on oral Anticoagulants    06/21/24 2332                    Discharge Planning   DEVAN: 6/27/2024     Code Status: Full Code   Is the patient medically ready for discharge?:     Reason for patient still in hospital (select all that apply): Patient trending condition, Laboratory test, and Consult recommendations  Discharge Plan A: New Nursing Home placement - detention care facility                  Viry Griffin PA-C  Department of Hospital Medicine   [facility]H

## 2024-06-26 NOTE — ASSESSMENT & PLAN NOTE
Patient has hyponatremia which is uncontrolled,We will aim to correct the sodium by 4-6mEq in 24 hours. We will monitor sodium Daily. The hyponatremia is due to Medications: SSRIs and renal insufficiency. We will obtain the following studies: Urine sodium, urine osmolality, serum osmolality. We will treat the hyponatremia with Hemodialysis and Removal of offending medications. The patient's sodium results have been reviewed and are listed below.  Recent Labs   Lab 06/26/24  0837   *     - patient with chronic hyponatremia most likely 2/2 ESRD  - hyperglycemic, corrected Na 134

## 2024-06-26 NOTE — ASSESSMENT & PLAN NOTE
Patient's anemia is currently controlled. Has not received any PRBCs to date. Etiology likely d/t chronic disease due to ESRD  Current CBC reviewed-   Lab Results   Component Value Date    HGB 9.7 (L) 06/26/2024    HCT 30.4 (L) 06/26/2024     Monitor serial CBC and transfuse if patient becomes hemodynamically unstable, symptomatic or H/H drops below 7/21.

## 2024-06-26 NOTE — PROGRESS NOTES
[facility] H[facility] NOTE     Patient currently on hemodialysis for removal of uremic toxins and volume.     Patient seen and evaluated on hemodialysis, tolerating treatment, see HD flowsheet for vitals and assessments.    Labs have been reviewed and the dialysate bath has been adjusted.       Assessment/Plan:    -Patient seen on HD, tolerating treatment well, w/o complaints   -UF goal of 2.5L  -Renal diet, if not NPO   -Strict I/O's and daily weights  -Daily renal function panels  -Keep MAP >65 while on HD   -Hgb goal 10-11, epo with HD   -continue calcium acetate   -Will continue to follow while inpatient     Katie Monique DNP-FNP, C  Nephrology  Pager: [phone]

## 2024-06-26 NOTE — PROGRESS NOTES
Bedside HD completed, blood returned and and catheter ports  flushed with normal saline, heparin instilled into each port as indicated.  Ports capped and secured. Post B/Pm 127/78, pulse 94, os st  97% on room air. Net uf removed during HD 2.5 liters  patient alert and stable

## 2024-06-26 NOTE — ASSESSMENT & PLAN NOTE
-Chronic, uncontrolled.  -Hold zoloft for now pending hyponatremia work up.   -Can consider psychiatry consult - consult placed:   - recommend continuing zoloft, trazadone 25mg qhs

## 2024-06-26 NOTE — PLAN OF CARE
2:04 PM EDUARD spoke to Bea with [facility] to follow up on referral. Per Bea their  is still reviewing to make the final determination of acceptance. EDUARD will continue to follow up.     JENELLE Mahoney  [facility]  Ext. [phone]

## 2024-06-26 NOTE — ASSESSMENT & PLAN NOTE
Chronic kidney disease-mineral and bone disorder   MWF schedule, last dialyzed 6/26  Residual renal function?- Yes    - Nephrology consulted  - Continue chronic hemodialysis  - Monitor daily electrolytes and defer dialysis orders to nephrology  - Renally dose medications  - Renal diet  - Continue phosphorus binders  - Daily weights/strict I&Os  - 1.5L FR

## 2024-06-26 NOTE — CONSULTS
"CONSULTATION LIAISON PSYCHIATRY INITIAL EVALUATION    Patient Name: Suyapa Connelly  MRN: [id number]  Patient Class: IP- Inpatient  Admission Date: 6/21/2024  Attending Physician: Farnces Bee MD      HPI:   Suyapa Connelly is a 58 y.o. female with a past psychiatric history of adjustment disorder, anxiety, and depression and pertinent past medical history of HTN, HLD, DM2, ESRD on HD MWF, b/l BKA/AKA, CAD, and Afib who presents to ED for confusion and is admitted to the hospital for Acute encephalopathy.     Psychiatry consulted for "tearful, depressed. prev on zoloft, admitting concern of causing low Na?  requesting eval."     Per chart review:  Pt was previously seen by [facility] in Feb of this year:  Consult for hallucinations/crying  Denied AVH  Adjustment d/o  Rec'd resuming home zoloft 100 mg daily     Per Student Dr. Stokes, patient is tearful but cooperative. She is alert and oriented to place and year but not month or day of week. She states that she has been feeling sad recently due to her complicated medical history and has been in and out of the hospital for various issues for the last two months including a fall and a LOC episode. 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 She typically loves to work and help people so these recent health issues and mobility loss has greatly affected her mood. Although she states she doesn't have any anxiety, her past psyc notes have stated she has generalized anxiety disorder.      On psych eval, pt is accompanied by  at bedside. He provides some of the hx. Pt reports that she has been "feeling sorry" for herself. She states, "I can't believe I don't have my legs anymore." She had an amputation in 2021 and another in 2022. She has had a very difficult time coming to terms w/ her new reality since then. " "Feels that her quality of life is significantly worse and no longer has the ability/freedom to travel. 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       notes that pt has been making vaguely suicidal remarks ~once every other day for the past ~2 y. She talks about "going home," as in to heaven. Pt endorses frequent passive SI. Reports hopelessness about the future. She denies suicidal plan/intent as well as any h/o SA or self-harm. Denies access to lethal means. She cites her  as the main reason she continues living. Pt endorses depressive sx including difficulty staying asleep, anhedonia, feelings of guilt (often says, "I'm sorry" to her  for no particular reason), and poor concentration. She has felt this way, more or less, since the amputations. She denies h/o depression outside of the past couple years. Has been taking Zoloft 100 mg, prescribed by PCP, which was stopped upon admission to hospital. Unsure how much Zoloft was helping, but her mood has been significantly worse since being admitted and off Zoloft. She also reports persistent anxiety which she tries to hold in and not show. Has had panic attacks in past.       also notes that pt's cognitive fx has gradually declined over months, which he attributes to "cerebral vascular disease." Pt acknowledges difficulty w/ memory. Is able to recall what she had for breakfast as well as some remote memories but struggles w/ registration (2/3) and recall (0/3) when tested. Has been feeling confused while in hospital, less so at time of interview. CAM-ICU neg.  reports that she pulled her IV out in confusion earlier in day.      Pt is interested in therapy as well as medication for her depression and anxiety. She takes trazodone (25 mg) at home and would like something to help w/ sleep while in the hospital.             Collateral (w/ pt's permission):    present      Medical " "Review of Systems:  Pertinent items are noted in HPI.     Psychiatric Review of Symptoms (is pt experiencing or having changes in):  sleep: yes  anhedonia: yes  guilt/worthlessness: yes  fatigue: no  concentration: yes  appetite/weight: no  SI or self-harm: yes, passive SI  anxiety: yes  trauma: yes, loss of limbs  sheryl:no  psychosis: no     Portions of hx below gleaned via chart review:     Past Psychiatric History:  Previous medication trials: yes, zoloft 100 mg, seroquel and trazodone for sleep  Previous psychiatric hospitalizations:no   Previous suicide attempts: no  History of violence: no  Outpatient psychiatrist: no  Family psychiatric history: no     Substance Use History (w/ emphasis on past 12 months):  Recreational drugs: marijuana  Use of alcohol: denied  Tobacco/nicotine use:no  Rehab history:no     Social History:  Marital status:   Children: 1  Employment status/info: on disability  :no  Education: some college  Special ed: no  Housing: with spouse  Access to gun / lethal means: no, denies  Psychosocial stressors: health  Functioning relationships: good support system, good relationship w/ spouse, children, and grandchildren     Legal History:  Past charges/incarcerations: no  Pending charges:no    Mental Status Exam:  General Appearance: obese  Behavior: pleasant, polite  Involuntary Movements and Motor Activity: no abnormal involuntary movements noted; no tics, no tremors, no akathisia, no dystonia, no evidence of tardive dyskinesia; no psychomotor agitation or retardation  Gait and Station: unable to assess - patient lying down or seated  Speech and Language: decreased spontaneity, slowed soft  Mood: "Sad"  Affect: dysthymic, dysphoric  Thought Process and Associations: intact; linear, goal-directed, organized, and logical; no loosening of associations noted  Thought Content and Perceptions:: no suicidal or homicidal ideation, no auditory or visual hallucinations, no paranoid " ideation, no ideas of reference, no evidence of delusions or psychosis  Sensorium and Orientation: intact; alert with clear sensorium; oriented fully to person, place, time and situation  Recent and Remote Memory: grossly intact, able to recall relevant and salient information from the recent and remote past  Attention and Concentration: grossly intact, attentive to the conversation and not readily distractible  Fund of Knowledge: grossly intact, used appropriate vocabulary and demonstrated an awareness of current events, consistent with educational level achieved  Insight: intact, demonstrates awareness of illness and situation  Judgment: intact, behavior is adequate/appropriate to the circumstances, compliant with health provider's recommendations and instructions    CAM ICU positive? no      ASSESSMENT & RECOMMENDATIONS   Major depressive disorder, chronic, recurrent, moderate  Acute encephalopathy, improving    MDD   PSYCH MEDICATIONS  Scheduled: Defer to primary on re-initiation of zoloft 100mg due to potential contribution of hyponatremia; may restart trazodone 25m QHS; recommend obtaining EKG for Qtc   PRN: None    DELIRIUM  DELIRIUM BEHAVIOR MANAGEMENT  PLEASE utilize CHEMICAL restraints with PRN meds first for agitation. Minimize use of PHYSICAL restraints OR have periods of being out of physical restraints if possible.  Keep window shades open and room lit during day and room dim at night in order to promote normal sleep-wake cycles  Encourage family at bedside. Roxie patient often to situation, location, date.  Continue to Limit or Discontinue use of Narcotics, Benzos and Anti-cholinergic medications as they may worsen delirium.  Continue medical workup for causative etiology of Delirium.     RISK ASSESSMENT  NO NEED FOR PEC patient NOT in any imminent danger of hurting self or others and not gravely disabled.     FOLLOW UP  Will sign off. Resources provided in patient's discharge  instructions.    DISPOSITION - once medically cleared:   Defer to medical team    Please contact ON CALL psychiatry service (24/7) for any acute issues that may arise.    Dr. Suleiman Santoyo   Psychiatry  [facility]  6/26/2024 9:20 AM        --------------------------------------------------------------------------------------------------------------------------------------------------------------------------------------------------------------------------------------    CONTINUED HISTORY & OBJECTIVE clinical data & findings reviewed and noted for above decision making    Past Medical/Surgical History:   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    Osteomyelitis     Osteomyelitis of left foot 04/30/2021    Ulcer of left foot     Vaginal delivery     x1     Past Surgical History:   Procedure Laterality Date    ABOVE-KNEE AMPUTATION Left 5/18/2021    Procedure: AMPUTATION, ABOVE KNEE;  Surgeon: Teddy Huber MD;  Location: [facility] OR [address] [place] [address];  Service: Vascular;  Laterality: Left;    ABOVE-KNEE AMPUTATION Right 3/18/2022    Procedure: AMPUTATION, ABOVE KNEE;  Surgeon: DAYNE Florez II, MD;  Location: [facility] OR [address] [place] [address];  Service: Vascular;  Laterality: Right;    Angiogram - Right Extremity Right 7/9/15    angiogram-left leg  10/6/15    ANGIOGRAPHY OF LOWER EXTREMITY Left 4/29/2021    Procedure: ANGIOGRAM, LOWER EXTREMITY;  Surgeon: Teddy Huber MD;  Location: [address];  Service: Vascular;  Laterality: Left;    BELOW KNEE AMPUTATION OF LOWER EXTREMITY Right 12/28/2021    Procedure: AMPUTATION, BELOW KNEE;  Surgeon: Kaitlyn Rojas MD;  Location: [facility];  Service: General;  Laterality: Right;     CATHETERIZATION OF BOTH LEFT AND RIGHT HEART N/A 12/18/2019    Procedure: CATHETERIZATION, HEART, BOTH LEFT AND RIGHT;  Surgeon: Que Fernando III, MD;  Location: [facility] CATH LAB;  Service: Cardiology;  Laterality: N/A;    CORONARY ANGIOGRAPHY N/A 12/18/2019    Procedure: ANGIOGRAM, CORONARY ARTERY;  Surgeon: Que Fernando III, MD;  Location: [facility] CATH LAB;  Service: Cardiology;  Laterality: N/A;    CORONARY ANGIOGRAPHY INCLUDING BYPASS GRAFTS WITH CATHETERIZATION OF LEFT HEART N/A 7/28/2020    Procedure: ANGIOGRAM, CORONARY, INCLUDING BYPASS GRAFT, WITH LEFT HEART CATHETERIZATION, 9 am;  Surgeon: Rachel Easley MD;  Location: [facility] CATH LAB;  Service: Cardiology;  Laterality: N/A;    CORONARY ARTERY BYPASS GRAFT (CABG) N/A 1/14/2020    Procedure: CORONARY ARTERY BYPASS GRAFT (CABG) x 1     Off Pump;  Surgeon: Huang Altamirano MD;  Location: [facility] OR [address] [place] [address];  Service: Cardiovascular;  Laterality: N/A;    CREATION OF FEMORAL-TIBIAL ARTERY BYPASS Left 4/29/2021    Procedure: CREATION, BYPASS, ARTERIAL, FEMORAL TO ANTERIOR TIBIAL;  Surgeon: Teddy Huber MD;  Location: [facility] OR [address] [place] [address];  Service: Vascular;  Laterality: Left;    CREATION OF FEMOROPOPLITEAL ARTERIAL BYPASS USING GRAFT Left 8/18/2020    Procedure: CREATION, BYPASS, ARTERIAL, FEMORAL TO POPLITEAL, USING GRAFT, LEFT LOWER EXTREMITY;  Surgeon: Teddy Huber MD;  Location: [facility];  Service: Vascular;  Laterality: Left;  REQUEST 7:15 A.M. START----COVID NEGATIVE ON 8/17  1ST CASE STARTE PER LEANA ON 8/7/2020 @ 942AM-LO  RN PREOP 8/12/2020   T/S-----CLEARED BY CARDS-------PENDING INSURANCE    DEBRIDEMENT OF FOOT Left 9/8/2020    Procedure: DEBRIDEMENT, FOOT;  Surgeon: Rosio Mayes DPM;  Location: [facility];  Service: Podiatry;  Laterality: Left;  request neoxx .   RN Pre Op 9-4-2020, Covid negative on 9/5/20. C A    DEBRIDEMENT OF FOOT  3/4/2021    Procedure: DEBRIDEMENT, FOOT;  Surgeon: Teddy Huber MD;  Location: [facility];   Service: Vascular;;    DEBRIDEMENT OF FOOT Left 3/9/2021    Procedure: DEBRIDEMENT, FOOT, bone biopsy;  Surgeon: Rosio Mayes DPM;  Location: [facility] OR;  Service: Podiatry;  Laterality: Left;  Request neoxx---COVID IN AM  REQUESTING NOON START  RN Phone Pre op.On Blood thinners Plavix and Eliquis.  Covid am of surgery. C A    DEBRIDEMENT OF FOOT Left 5/4/2021    Procedure: DEBRIDEMENT, FOOT;  Surgeon: Farooq Morley DPM;  [address];  Service: Podiatry;  Laterality: Left;    INSERTION OF TUNNELED CENTRAL VENOUS HEMODIALYSIS CATHETER N/A 1/27/2020    Procedure: Insertion, Catheter, Central Venous, Hemodialysis;  Surgeon: ESTEBAN Gomez III, MD;  Location: [facility] CATH LAB;  Service: Peripheral Vascular;  Laterality: N/A;    INSERTION OF TUNNELED CENTRAL VENOUS HEMODIALYSIS CATHETER  5/10/2023    Procedure: Insertion, Catheter, Central Venous, Hemodialysis;  Surgeon: Romulo Queen MD;  Location: [facility] CATH LAB;  Service: Cardiology;;    PERCUTANEOUS TRANSLUMINAL ANGIOPLASTY N/A 3/4/2021    Procedure: PTA (ANGIOPLASTY, PERCUTANEOUS, TRANSLUMINAL);  Surgeon: Teddy Huber MD;  Location: [facility] OR;  Service: Vascular;  Laterality: N/A;    REMOVAL OF ARTERIOVENOUS GRAFT Left 5/27/2021    Procedure: REMOVAL, GRAFT, LEFT LOWER EXTREMITY, WOUND EXPLORATION;  Surgeon: Teddy Huber MD;  [address];  Service: Vascular;  Laterality: Left;    REMOVAL OF NAIL OF DIGIT Left 3/9/2021    Procedure: REMOVAL, NAIL, DIGIT;  Surgeon: Rosio Mayes DPM;  Location: [facility] OR;  Service: Podiatry;  Laterality: Left;    RIGHT HEART CATHETERIZATION Right 5/10/2023    Procedure: INSERTION, CATHETER, RIGHT HEART;  Surgeon: Romulo Queen MD;  Location: [facility] CATH LAB;  Service: Cardiology;  Laterality: Right;    THROMBECTOMY Left 3/4/2021    Procedure: THROMBECTOMY, LEFT LOWER EXTREMITY BYPASS GRAFT, ANGIOGRAM, POSSIBLE INTERVENTION, POSSIBLE LEFT LOWER EXTREMITY BYPASS;  Surgeon: Teddy BLANCA  MD Cecile;  Location: [facility] OR;  Service: Vascular;  Laterality: Left;    THROMBECTOMY Left 4/29/2021    Procedure: GRAFT THROMBECTOMY, LEFT LOWER EXTREMITY;  Surgeon: Teddy Huber MD;  Location: [facility] OR [facility];  Service: Vascular;  Laterality: Left;  14.5 min  1179.85 mGy  341.01 Gycm2  240 ml dye    THROMBECTOMY  10/22/2021    Procedure: THROMBECTOMY;  Surgeon: Saad Arenas MD;  Location: [facility] CATH LAB/EP;  Service: Cardiology;;       Current Medications:   Scheduled Meds:    0.9% NaCl   Intravenous Once    allopurinoL  50 mg Oral Every other day    apixaban  5 mg Oral BID    atorvastatin  80 mg Oral Daily    calcitRIOL  0.5 mcg Oral Daily    calcium acetate(phosphat bind)  667 mg Oral TID WM    carvediloL  6.25 mg Oral BID    clopidogreL  75 mg Oral Daily    famotidine  20 mg Oral Daily    furosemide  40 mg Oral Daily    gabapentin  100 mg Oral BID    hydrALAZINE  50 mg Oral TID    insulin aspart U-100  3 Units Subcutaneous TIDWM     PRN Meds:   Current Facility-Administered Medications:     acetaminophen, 1,000 mg, Oral, Q8H PRN    acetaminophen, 650 mg, Oral, Q6H PRN    barium sulfate, 450 mL, Oral, ONCE PRN    dextrose 10%, 12.5 g, Intravenous, PRN    dextrose 10%, 25 g, Intravenous, PRN    diphenhydrAMINE, 25 mg, Oral, Q6H PRN    diphenhydrAMINE, 50 mg, Intramuscular, On Call Procedure    glucagon (human recombinant), 1 mg, Intramuscular, PRN    glucose, 16 g, Oral, PRN    glucose, 24 g, Oral, PRN    heparin (porcine), 1,000 Units, Intra-Catheter, PRN    hydrocortisone, , Topical (Top), BID PRN    hydrOXYzine HCL, 25 mg, Oral, TID PRN    insulin aspart U-100, 0-5 Units, Subcutaneous, QID (AC + HS) PRN    melatonin, 6 mg, Oral, Nightly PRN    naloxone, 0.02 mg, Intravenous, PRN    prochlorperazine, 5 mg, Intravenous, Q6H PRN    sodium chloride 0.9%, 10 mL, Intravenous, Q12H PRN    Allergies:   Review of patient's allergies indicates:   Allergen Reactions    Ciprofloxacin Itching    Iodine       Kidney injury    Pcn [penicillins]      Rash; tolerated ceftriaxone on 1/13/20       Vitals  Vitals:    06/26/24 0915   BP: (!) 154/67   Pulse: 80   Resp: 18   Temp:        Labs/Imaging/Studies:  Recent Results (from the past 24 hour(s))   POCT glucose    Collection Time: 06/25/24  4:58 PM   Result Value Ref Range    POCT Glucose 207 (H) 70 - 110 mg/dL   POCT glucose    Collection Time: 06/25/24  8:53 PM   Result Value Ref Range    POCT Glucose 233 (H) 70 - 110 mg/dL   Urinalysis, Reflex to Urine Culture Urine, Catheterized    Collection Time: 06/26/24  4:37 AM    Specimen: Urine   Result Value Ref Range    Specimen UA Urine, Catheterized     Color, UA Orange (A) Yellow, Straw, Batsheva    Appearance, UA Cloudy (A) Clear    pH, UA 7.0 5.0 - 8.0    Specific Gravity, UA 1.015 1.005 - 1.030    Protein, UA 2+ (A) Negative    Glucose, UA 2+ (A) Negative    Ketones, UA Negative Negative    Bilirubin (UA) Negative Negative    Occult Blood UA 3+ (A) Negative    Nitrite, UA Negative Negative    Leukocytes, UA 3+ (A) Negative   Urinalysis    Collection Time: 06/26/24  4:37 AM   Result Value Ref Range    Specimen UA Urine, Catheterized     Color, UA Orange (A) Yellow, Straw, Batsheva    Appearance, UA Cloudy (A) Clear    pH, UA 7.0 5.0 - 8.0    Specific Gravity, UA 1.015 1.005 - 1.030    Protein, UA 2+ (A) Negative    Glucose, UA 2+ (A) Negative    Ketones, UA Negative Negative    Bilirubin (UA) Negative Negative    Occult Blood UA 3+ (A) Negative    Nitrite, UA Negative Negative    Leukocytes, UA 3+ (A) Negative   Urinalysis Microscopic    Collection Time: 06/26/24  4:37 AM   Result Value Ref Range    RBC, UA >100 (H) 0 - 4 /hpf    WBC, UA >100 (H) 0 - 5 /hpf    WBC Clumps, UA Many (A) None-Rare    Bacteria Rare None-Occ /hpf    Hyaline Casts, UA 0 0-1/lpf /lpf    Microscopic Comment SEE COMMENT    Urinalysis Microscopic    Collection Time: 06/26/24  4:37 AM   Result Value Ref Range    RBC, UA >100 (H) 0 - 4 /hpf    WBC, UA >100  (H) 0 - 5 /hpf    WBC Clumps, UA Many (A) None-Rare    Bacteria Rare None-Occ /hpf    Hyaline Casts, UA 0 0-1/lpf /lpf    Microscopic Comment SEE COMMENT    POCT glucose    Collection Time: 06/26/24  8:00 AM   Result Value Ref Range    POCT Glucose 267 (H) 70 - 110 mg/dL   CBC Auto Differential    Collection Time: 06/26/24  8:37 AM   Result Value Ref Range    WBC 5.11 3.90 - 12.70 K/uL    RBC 2.97 (L) 4.00 - 5.40 M/uL    Hemoglobin 9.7 (L) 12.0 - 16.0 g/dL    Hematocrit 30.4 (L) 37.0 - 48.5 %     (H) 82 - 98 fL    MCH 32.7 (H) 27.0 - 31.0 pg    MCHC 31.9 (L) 32.0 - 36.0 g/dL    RDW 18.4 (H) 11.5 - 14.5 %    Platelets 173 150 - 450 K/uL    MPV 10.9 9.2 - 12.9 fL    Immature Granulocytes 0.2 0.0 - 0.5 %    Gran # (ANC) 3.6 1.8 - 7.7 K/uL    Immature Grans (Abs) 0.01 0.00 - 0.04 K/uL    Lymph # 0.7 (L) 1.0 - 4.8 K/uL    Mono # 0.7 0.3 - 1.0 K/uL    Eos # 0.1 0.0 - 0.5 K/uL    Baso # 0.04 0.00 - 0.20 K/uL    nRBC 0 0 /100 WBC    Gran % 70.8 38.0 - 73.0 %    Lymph % 12.7 (L) 18.0 - 48.0 %    Mono % 13.9 4.0 - 15.0 %    Eosinophil % 1.6 0.0 - 8.0 %    Basophil % 0.8 0.0 - 1.9 %    Differential Method Automated      Imaging Results              X-Ray Chest 1 View (Final result)  Result time 06/22/24 00:54:23      Final result by Monik Lara MD (06/22/24 00:54:23)                   Impression:      Please see above.      Electronically signed by: Monik Lara MD  Date:    06/22/2024  Time:    00:54               Narrative:    EXAMINATION:  XR CHEST 1 VIEW    CLINICAL HISTORY:  infectious work up;    TECHNIQUE:  Single frontal view of the chest was performed.    COMPARISON:  06/11/2024    FINDINGS:  Cardiac monitoring leads overlie the chest.  There is a stably positioned left-sided central venous catheter in place.  Postoperative change of median sternotomy.  There is unchanged enlargement of the cardiomediastinal silhouette noting atherosclerosis of the thoracic aorta.  The lungs are symmetrically expanded with  diffuse increased interstitial and parenchymal attenuation similar to prior study.  Findings can be seen with interstitial edema or infectious process.  Clinical correlation advised.  No large region of confluent airspace consolidation or substantial volume of pleural fluid identified.  Osseous structures are intact.                                       CT Head Without Contrast (Final result)  Result time 06/21/24 21:23:20      Final result by Lino Peter MD (06/21/24 21:23:20)                   Impression:      No acute finding or detrimental change when compared with 06/11/2024, allowing for motion and artifact limitations.    Other findings discussed in the body of the report.      Electronically signed by: Lino Peter MD  Date:    06/21/2024  Time:    21:23               Narrative:    EXAMINATION:  CT HEAD WITHOUT CONTRAST    CLINICAL HISTORY:  Mental status change, unknown cause;    TECHNIQUE:  Low dose axial images were obtained through the head.  Coronal and sagittal reformations were also performed. Contrast was not administered.    COMPARISON:  06/11/2024.    FINDINGS:  Exam quality is limited by motion and streak artifact.    Generalized cerebral volume loss and chronic ischemic changes as seen previously.    No evidence of acute territorial infarct, hemorrhage, mass effect, or midline shift.    Ventricles are prominent and stable when compared with the prior study.  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    No displaced calvarial fracture.    Visualized paranasal sinuses and mastoid air cells are essentially clear.

## 2024-06-26 NOTE — PROGRESS NOTES
[address] - [facility]  Infectious Disease  Progress Note    Patient Name: Suyapa Connelly  MRN: [id number]  Admission Date: 6/21/2024  Length of Stay: 3 days  Attending Physician: Frances Bee MD  Primary Care Provider: Feliciano Nguyen III, PA-C    Isolation Status: No active isolations  Assessment/Plan:      Renal/  Acute cystitis without hematuria  57 yo female with BL AKAs, ESRD on HD and other comorbidities admitted with lethargy, anorexia and confusion.  CTH negative for acute findings.  UA + and Ucx with 10-49k CFU C Krusei.  Abd exam and history not cw UTI as cause of cognitive findings/condition but concern for abd process and other etiology causing current condition. AF and WBC 3.79.  Lost IV access.  Hep C and HIV negative.  CT A/P without acute findings - constipation noted - ? Source of adb pain.  More oriented today.   Per chart review urine was straight cath.    Plan:  1. Repeat UA and Urine Cx with straight cath as patient makes minimal urine - need to reassess if Candida still present.  2. Rec laxatives for constipation  3.  Hold abx and antifungals not recommended  4. Seen with ID staff   5. Discussed with primary team, Viry Griffin PA-C  5. Will follow.        Anticipated Disposition: tbd    Thank you for your consult. I will follow-up with patient. Please contact us if you have any additional questions.    VENANCIO Green  Infectious Disease  [address] [facility]H    Subjective:     Principal Problem:Acute encephalopathy    HPI: Intake history taken from chart as patient unable to provide but details reviewed and confirmed with :     Suyapa Connelly is a 58 y.o. female with a PMHx of HTN, GERD, HLD, PVD, ESRD on HD MFW, b/l AKAs, CAD s/p CABG, afib on eliquis, depression, and anxiety who presented to the ED 6/21 for evaluation of confusion. HPI and ROS limited secondary to patient's mental status and poor participation. Per patient's  she has been more sleepy  and less interactive over the past several days prior to admit. He reported that her urine has been foul-smelling and more cloudy. He stated she was also peeing more frequently in complaining of burning with urination and some occasional suprapubic pain. Denied any fevers or chills. The patient denied CP, SOB, abdominal pain, or dysuria. Per  patient was dialyzed today a full dialysis and they came straight here after.      In the ED, VSS, afebrile. CBC with stable leukopenia and anemia. Na 129, Chloride 94, Bicarb 22, Glucose 50. POC glucose 150. Cr 2.7 (consistent with ESRD). Albumin 3.2. Tbili 1.1. Mag 2.1. UA with 3+ leukocytosis, 74 WBCs, but no bacteria. CTH with no acute finding or detrimental change when compared with 06/11/2024, allowing for motion and artifact limitations. The patient received 1g IV Rocephin.    Interval history:  Patient admitted and received 2 days of rocephin but has been off last 2 days.  Urine culture showed 10-49k CFU of C krusei but no bacterial growth.  ID consulted for candida uti.  Patient seen and is confused on exam.  Currently denies dysuria but admits to abd pain.  reports she uses a Purewick at home and though she is on HD, she still produces some urine daily. The patient denies any recent fever, chills, or sweats.    Interval History:   No acute events  Afebrile and WBC 3.79  CT A/P done  No complaints  The patient denies any recent fever, chills, or sweats.      Review of Systems   Constitutional:  Negative for activity change, chills, diaphoresis and fever.   Respiratory:  Negative for cough, shortness of breath and wheezing.    Cardiovascular:  Negative for chest pain.   Gastrointestinal:  Positive for abdominal pain. Negative for constipation, diarrhea, nausea and vomiting.   Genitourinary:  Negative for dysuria, frequency and urgency.   Neurological:  Negative for dizziness.   Hematological:  Does not bruise/bleed easily.     Objective:     Vital Signs  (Most Recent):  Temp: 97.8 °F (36.6 °C) (06/25/24 0750)  Pulse: 83 (06/25/24 0750)  Resp: 18 (06/25/24 0750)  BP: 139/69 (06/25/24 0750)  SpO2: 96 % (06/25/24 0750) Vital Signs (24h Range):  Temp:  [97.8 °F (36.6 °C)-97.9 °F (36.6 °C)] 97.8 °F (36.6 °C)  Pulse:  [67-87] 83  Resp:  [16-18] 18  SpO2:  [92 %-98 %] 96 %  BP: ()/(53-85) 139/69     Weight: 74.2 kg (163 lb 9.3 oz)  Body mass index is 49.92 kg/m².    Estimated Creatinine Clearance: 13.7 mL/min (A) (based on SCr of 3.5 mg/dL (H)).     Physical Exam  Constitutional:       General: She is not in acute distress.     Appearance: She is well-developed. She is obese. She is ill-appearing. She is not toxic-appearing or diaphoretic.       HENT:      Head: Normocephalic and atraumatic.   Cardiovascular:      Rate and Rhythm: Normal rate and regular rhythm.      Heart sounds: Normal heart sounds. No murmur heard.     No friction rub. No gallop.   Pulmonary:      Effort: Pulmonary effort is normal. No respiratory distress.      Breath sounds: Normal breath sounds. No wheezing or rales.   Abdominal:      General: Bowel sounds are normal. There is no distension.      Palpations: Abdomen is soft. There is no mass.      Tenderness: There is abdominal tenderness (improved) in the periumbilical area and left upper quadrant. There is no guarding or rebound.       Skin:     General: Skin is warm and dry.   Neurological:      Mental Status: She is alert.      Comments: Oriented now to person place and year but not president.   Psychiatric:         Behavior: Behavior normal.          Significant Labs: Blood Culture:   Recent Labs   Lab 02/04/24  1705 05/07/24  2104 06/12/24  0949 06/12/24  0954   LABBLOO No growth after 5 days.  No growth after 5 days. No growth after 5 days.  No growth after 5 days. No growth after 5 days. No growth after 5 days.     CBC:   Recent Labs   Lab 06/24/24  0314 06/25/24  0904   WBC 4.30 3.79*   HGB 9.7* 9.6*   HCT 29.8* 29.2*    169      CMP:   Recent Labs   Lab 06/24/24  0314 06/24/24  0942 06/24/24  1219 06/25/24  0904   *  --   --  133*   K 4.9  --   --  4.3   CL 96  --   --  100   CO2 24  --   --  22*   *  --   --  176*   BUN 54* 57* 20 32*   CREATININE 5.1*  --   --  3.5*   CALCIUM 9.3  --   --  9.5   PROT 7.8  --   --  7.9   ALBUMIN 3.1*  --   --  3.0*   BILITOT 0.7  --   --  0.8   ALKPHOS 162*  --   --  127   AST 18  --   --  17   ALT 9*  --   --  10   ANIONGAP 12  --   --  11     Urine Culture:   Recent Labs   Lab 05/07/24 2223 05/29/24  0118 06/21/24 2027   LABURIN ESCHERICHIA COLI  > 100,000 cfu/ml  * Multiple organisms isolated. None in predominance.  Repeat if  clinically necessary. ELIZABETH KRUSEI  10,000 - 49,999 cfu/ml  Treatment of asymptomatic candiduria is not recommended (except for   specific populations). Candida isolated in the urine typically   represents colonization. If an indwelling urinary catheter is present  it should be removed or replaced.  *     Urine Studies:   Recent Labs   Lab 06/21/24 2027   COLORU Yellow  Yellow   APPEARANCEUA Clear  Clear   PHUR 6.0  6.0   SPECGRAV 1.020  1.020   PROTEINUA 3+*  3+*   GLUCUA Negative  Negative   KETONESU Negative  Negative   BILIRUBINUA Negative  Negative   OCCULTUA 3+*  3+*   NITRITE Negative  Negative   LEUKOCYTESUR 3+*  3+*   RBCUA 14*   WBCUA 74*   BACTERIA None   SQUAMEPITHEL 1   HYALINECASTS 0     All pertinent labs within the past 24 hours have been reviewed.    Significant Imaging: I have reviewed all pertinent imaging results/findings within the past 24 hours.  CT Abdomen Pelvis Without Contrast [[id number]] Resulted: 06/25/24 1124   Order Status: Sent Updated: 06/25/24 1145   X-Ray Chest 1 View [[id number]] Resulted: 06/22/24 0054   Order Status: Completed Updated: 06/22/24 0056   Narrative:     EXAMINATION:  XR CHEST 1 VIEW    CLINICAL HISTORY:  infectious work up;    TECHNIQUE:  Single frontal view of the chest was  performed.    COMPARISON:  06/11/2024    FINDINGS:  Cardiac monitoring leads overlie the chest.  There is a stably positioned left-sided central venous catheter in place.  Postoperative change of median sternotomy.  There is unchanged enlargement of the cardiomediastinal silhouette noting atherosclerosis of the thoracic aorta.  The lungs are symmetrically expanded with diffuse increased interstitial and parenchymal attenuation similar to prior study.  Findings can be seen with interstitial edema or infectious process.  Clinical correlation advised.  No large region of confluent airspace consolidation or substantial volume of pleural fluid identified.  Osseous structures are intact.   Impression:       Please see above.      Electronically signed by: Monik Lara MD  Date: 06/22/2024  Time: 00:54   CT Head Without Contrast [[id number]] Resulted: 06/21/24 2123   Order Status: Completed Updated: 06/21/24 2125   Narrative:     EXAMINATION:  CT HEAD WITHOUT CONTRAST    CLINICAL HISTORY:  Mental status change, unknown cause;    TECHNIQUE:  Low dose axial images were obtained through the head.  Coronal and sagittal reformations were also performed. Contrast was not administered.    COMPARISON:  06/11/2024.    FINDINGS:  Exam quality is limited by motion and streak artifact.    Generalized cerebral volume loss and chronic ischemic changes as seen previously.    No evidence of acute territorial infarct, hemorrhage, mass effect, or midline shift.    Ventricles are prominent and stable when compared with the prior study.  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    No displaced calvarial fracture.    Visualized paranasal sinuses and mastoid air cells are essentially clear.   Impression:       No acute finding or detrimental change when compared with 06/11/2024, allowing for motion and artifact limitations.    Other findings discussed in the body of  the report.      Electronically signed by: Lino Peter MD  Date: 06/21/2024  Time: 21:23      Imaging History     2024    Date Procedure Name Study Review Link PACS Link Status Accession Number Location   06/25/24 11:45 AM CT Abdomen Pelvis  Without Contrast Study Review  Images Exam Ended [id number] [facility]   06/22/24 05:39 AM Cardiac monitoring strips Study Review  Final     06/21/24 11:33 PM X-Ray Chest 1 View Study Review  Images Final [id number] [facility]   06/21/24 08:52 PM CT Head Without Contrast Study Review  Images Final [id number] [facility]   06/12/24 04:40 AM Cardiac monitoring strips Study Review  Final     06/11/24 11:59 PM X-Ray Hip 2 or 3 views Right with Pelvis when performed Study Review  Images Final [id number] [facility]   06/11/24 11:59 PM X-Ray Chest AP Portable Study Review  Images Final [id number] [facility]   06/11/24 10:25 PM CT Cervical Spine Without Contrast Study Review  Images Final [id number] [facility]   06/11/24 10:25 PM CT Maxillofacial Without Contrast Study Review  Images Final [id number] [facility]   06/11/24 10:25 PM CT Head Without Contrast Study Review  Images Final [id number] [facility]   06/03/24 10:50 AM Cardiac monitoring strips Study Review  Final     06/02/24 07:52 PM Cardiac monitoring strips Study Review  Final     06/01/24 10:37 PM Cardiac monitoring strips Study Review  Final     06/01/24 03:03 PM Cardiac monitoring strips Study Review  Final     05/30/24 02:49 PM Cardiac monitoring strips Study Review  Final     05/29/24 09:47 PM MRI Brain Without Contrast Study Review  Images Final [id number] [facility]   05/29/24 05:23 AM Cardiac monitoring strips Study Review  Final     05/28/24 09:29 PM CTA STROKE MULTI-PHASE Study Review  Images Final [id number] [facility]

## 2024-06-26 NOTE — SUBJECTIVE & OBJECTIVE
Interval History: Patient seen and assessed at bedside. Afebrile, VSS. HD performed today at bedside. ID recommending UA repeated with straight cath, Ucx pending. Patient without complaints this am - AAOx4. CM assisting with discharge planning.       Review of Systems   Respiratory:  Negative for shortness of breath.    Cardiovascular:  Negative for chest pain.   Gastrointestinal:  Negative for nausea.   Genitourinary:  Negative for frequency.   Musculoskeletal:  Positive for arthralgias and back pain.     Objective:     Vital Signs (Most Recent):  Temp: 98 °F (36.7 °C) (06/26/24 0728)  Pulse: 78 (06/26/24 1215)  Resp: 16 (06/26/24 1215)  BP: (!) 107/55 (06/26/24 1220)  SpO2: 98 % (06/26/24 1145) Vital Signs (24h Range):  Temp:  [97.5 °F (36.4 °C)-98.2 °F (36.8 °C)] 98 °F (36.7 °C)  Pulse:  [78-86] 78  Resp:  [14-20] 16  SpO2:  [94 %-100 %] 98 %  BP: (107-182)/(51-83) 107/55     Weight: 74.2 kg (163 lb 9.3 oz)  Body mass index is 49.92 kg/m².    Intake/Output Summary (Last 24 hours) at 6/26/2024 1428  Last data filed at 6/26/2024 1220  Gross per 24 hour   Intake --   Output 3000 ml   Net -3000 ml         Physical Exam  Vitals and nursing note reviewed.   Constitutional:       Appearance: She is not toxic-appearing or diaphoretic.      Comments: Chronically ill appearing   HENT:      Head: Normocephalic and atraumatic.      Nose: Nose normal.      Mouth/Throat:      Mouth: Mucous membranes are moist.   Eyes:      Pupils: Pupils are equal, round, and reactive to light.   Cardiovascular:      Rate and Rhythm: Normal rate and regular rhythm.      Pulses: Normal pulses.   Pulmonary:      Effort: Pulmonary effort is normal. No respiratory distress.      Breath sounds: No wheezing, rhonchi or rales.   Abdominal:      General: Bowel sounds are normal. There is no distension.      Palpations: Abdomen is soft.      Tenderness: There is no abdominal tenderness. There is no guarding.   Musculoskeletal:         General: Normal  range of motion.      Cervical back: Normal range of motion.      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      Left Lower Extremity: Left leg is amputated above knee.   Skin:     General: Skin is warm and dry.      Capillary Refill: Capillary refill takes less than 2 seconds.   Neurological:      General: No focal deficit present.      Mental Status: She is alert. Mental status is at baseline.      Cranial Nerves: No facial asymmetry.      Motor: No tremor or seizure activity.      Comments: AAOx4 - patient more alert and participatory with exam this morning.    Psychiatric:         Behavior: Behavior is cooperative.             Significant Labs: All pertinent labs within the past 24 hours have been reviewed.    Significant Imaging: I have reviewed all pertinent imaging results/findings within the past 24 hours.

## 2024-06-26 NOTE — CONSULTS
Food & Nutrition  Education     Diet Education: Salt restriction and Diabetic Diet  Time Spent: 10 minutes   Learners: Patient, Caregiver     Handouts provided: Low Sodium Nutrition Therapy, Meal Planning Using the Plate Method, CHO Counting for People with Diabetes     Comments: Discussed importance of a low sodium diet. Reviewed high sodium foods that should be avoided. Food labels, salt free seasonings, and recommended sodium intake reviewed. Encouraged healthy, fresh foods that are low in sodium that are good for consumption. Fluid intake and conversions discussed. Foods considered fluids were reviewed and encouraged to monitor. General healthy diet encouraged. All questions/concerns were addressed.     Discussed importance of choosing healthy carbohydrates and to control the amount of carbohydrates you eat at each meal for blood sugar management. Reminded patient not to skip meals. Encouraged intake of fresh, unprocessed foods. Reviewed limiting sugary beverages such as soda pop, fruit juice and sweetened iced teas. Food labels, CHO counting, and recommended CHO intake reviewed.    Follow-Up: Yes     Please Re-consult as needed.   Thanks!    Glendy Trinh, MS, RD, LDN

## 2024-06-26 NOTE — ASSESSMENT & PLAN NOTE
Patient has acute metabolic encephalopathy that likely secondary to UTI.Treatment for underlying cause is underway. Pt with recurrent admissions for encephalopathy with extensive work ups including EEG, CTH, MRI brain, and vitamin panels. Will monitor neuro status carefully, avoid narcotics and benzos that will exacerbate agitation, and use PRN anti-psychotics for controls of behavior for self harm.  -Initially hypoglycemic which improved with treatment. Na 129.  -UA concerning for infection.  -ucx with candida krusei, ID consulted for treatment recommendations:   - obtain CT abd/pelvis - no acute intraabdominal abnormality    - repeat UA from straight cath remains infectious appearing, follow culture  -CXR with diffuse increased interstitial and parenchymal attenuation similar to prior study. No large region of confluent airspace consolidation or substantial volume of pleural fluid identified.   -CTH-negative  -Delirium precautions   - reports progressive decline in patient over the last few months. Initiated NH paperwork for patient. CM notified for assistance

## 2024-06-26 NOTE — SUBJECTIVE & OBJECTIVE
Interval History:   No acute events  Afebrile and WBC 3.79  Doing better  No complaints  The patient denies any recent dysuria, pain, fever, chills, or sweats.      Review of Systems   Constitutional:  Negative for activity change, chills, diaphoresis and fever.   Respiratory:  Negative for cough, shortness of breath and wheezing.    Cardiovascular:  Negative for chest pain.   Gastrointestinal:  Positive for abdominal pain. Negative for constipation, diarrhea, nausea and vomiting.   Genitourinary:  Negative for dysuria, frequency and urgency.   Neurological:  Negative for dizziness.   Hematological:  Does not bruise/bleed easily.     Objective:     Vital Signs (Most Recent):  Temp: 98 °F (36.7 °C) (06/26/24 0728)  Pulse: 78 (06/26/24 1215)  Resp: 16 (06/26/24 1215)  BP: (!) 107/55 (06/26/24 1220)  SpO2: 98 % (06/26/24 1145) Vital Signs (24h Range):  Temp:  [97.5 °F (36.4 °C)-98.2 °F (36.8 °C)] 98 °F (36.7 °C)  Pulse:  [78-86] 78  Resp:  [14-20] 16  SpO2:  [94 %-100 %] 98 %  BP: (107-182)/(51-83) 107/55     Weight: 74.2 kg (163 lb 9.3 oz)  Body mass index is 49.92 kg/m².    Estimated Creatinine Clearance: 10.5 mL/min (A) (based on SCr of 4.6 mg/dL (H)).     Physical Exam  Constitutional:       General: She is not in acute distress.     Appearance: She is well-developed. She is obese. She is ill-appearing. She is not toxic-appearing or diaphoretic.       HENT:      Head: Normocephalic and atraumatic.   Cardiovascular:      Rate and Rhythm: Normal rate and regular rhythm.      Heart sounds: Normal heart sounds. No murmur heard.     No friction rub. No gallop.   Pulmonary:      Effort: Pulmonary effort is normal. No respiratory distress.      Breath sounds: Normal breath sounds. No wheezing or rales.   Abdominal:      General: Bowel sounds are normal. There is no distension.      Palpations: Abdomen is soft. There is no mass.      Tenderness: There is abdominal tenderness (much improved) in the periumbilical area and  left upper quadrant. There is no guarding or rebound.       Skin:     General: Skin is warm and dry.   Neurological:      Mental Status: She is alert and oriented to person, place, and time.   Psychiatric:         Behavior: Behavior normal.          Significant Labs: Blood Culture:   Recent Labs   Lab 02/04/24  1705 05/07/24  2104 06/12/24  0949 06/12/24  0954   LABBLOO No growth after 5 days.  No growth after 5 days. No growth after 5 days.  No growth after 5 days. No growth after 5 days. No growth after 5 days.     CBC:   Recent Labs   Lab 06/25/24  0904 06/26/24  0837   WBC 3.79* 5.11   HGB 9.6* 9.7*   HCT 29.2* 30.4*    173     CMP:   Recent Labs   Lab 06/25/24  0904 06/26/24  0837   * 132*   K 4.3 4.7    100   CO2 22* 22*   * 252*   BUN 32* 46*   CREATININE 3.5* 4.6*   CALCIUM 9.5 9.4   PROT 7.9 7.9   ALBUMIN 3.0* 3.0*   BILITOT 0.8 0.8   ALKPHOS 127 171*   AST 17 18   ALT 10 8*   ANIONGAP 11 10     Urine Culture:   Recent Labs   Lab 05/07/24 2223 05/29/24  0118 06/21/24 2027   LABURIN ESCHERICHIA COLI  > 100,000 cfu/ml  * Multiple organisms isolated. None in predominance.  Repeat if  clinically necessary. ELIZABETH KRUSEI  10,000 - 49,999 cfu/ml  Treatment of asymptomatic candiduria is not recommended (except for   specific populations). Candida isolated in the urine typically   represents colonization. If an indwelling urinary catheter is present  it should be removed or replaced.  *     Urine Studies:   Recent Labs   Lab 06/21/24 2027 06/26/24  0437   COLORU Yellow  Yellow Orange*  Orange*   APPEARANCEUA Clear  Clear Cloudy*  Cloudy*   PHUR 6.0  6.0 7.0  7.0   SPECGRAV 1.020  1.020 1.015  1.015   PROTEINUA 3+*  3+* 2+*  2+*   GLUCUA Negative  Negative 2+*  2+*   KETONESU Negative  Negative Negative  Negative   BILIRUBINUA Negative  Negative Negative  Negative   OCCULTUA 3+*  3+* 3+*  3+*   NITRITE Negative  Negative Negative  Negative   LEUKOCYTESUR 3+*  3+*  3+*  3+*   RBCUA 14* >100*  >100*   WBCUA 74* >100*  >100*   BACTERIA None Rare  Rare   SQUAMEPITHEL 1  --    HYALINECASTS 0 0  0     All pertinent labs within the past 24 hours have been reviewed.    Significant Imaging: I have reviewed all pertinent imaging results/findings within the past 24 hours.  CT Abdomen Pelvis Without Contrast [[id number]] Resulted: 06/25/24 1124   Order Status: Sent Updated: 06/25/24 1145   X-Ray Chest 1 View [[id number]] Resulted: 06/22/24 0054   Order Status: Completed Updated: 06/22/24 0056   Narrative:     EXAMINATION:  XR CHEST 1 VIEW    CLINICAL HISTORY:  infectious work up;    TECHNIQUE:  Single frontal view of the chest was performed.    COMPARISON:  06/11/2024    FINDINGS:  Cardiac monitoring leads overlie the chest.  There is a stably positioned left-sided central venous catheter in place.  Postoperative change of median sternotomy.  There is unchanged enlargement of the cardiomediastinal silhouette noting atherosclerosis of the thoracic aorta.  The lungs are symmetrically expanded with diffuse increased interstitial and parenchymal attenuation similar to prior study.  Findings can be seen with interstitial edema or infectious process.  Clinical correlation advised.  No large region of confluent airspace consolidation or substantial volume of pleural fluid identified.  Osseous structures are intact.   Impression:       Please see above.      Electronically signed by: Monik Lara MD  Date: 06/22/2024  Time: 00:54   CT Head Without Contrast [[id number]] Resulted: 06/21/24 2123   Order Status: Completed Updated: 06/21/24 2125   Narrative:     EXAMINATION:  CT HEAD WITHOUT CONTRAST    CLINICAL HISTORY:  Mental status change, unknown cause;    TECHNIQUE:  Low dose axial images were obtained through the head.  Coronal and sagittal reformations were also performed. Contrast was not administered.    COMPARISON:  06/11/2024.    FINDINGS:  Exam quality is limited by motion and  streak artifact.    Generalized cerebral volume loss and chronic ischemic changes as seen previously.    No evidence of acute territorial infarct, hemorrhage, mass effect, or midline shift.    Ventricles are prominent and stable when compared with the prior study.  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    No displaced calvarial fracture.    Visualized paranasal sinuses and mastoid air cells are essentially clear.   Impression:       No acute finding or detrimental change when compared with 06/11/2024, allowing for motion and artifact limitations.    Other findings discussed in the body of the report.      Electronically signed by: Lino Peter MD  Date: 06/21/2024  Time: 21:23      Imaging History     2024    Date Procedure Name Study Review Link PACS Link Status Accession Number Location   06/25/24 11:45 AM CT Abdomen Pelvis  Without Contrast Study Review  Images Exam Ended [id number] [facility]   06/22/24 05:39 AM Cardiac monitoring strips Study Review  Final     06/21/24 11:33 PM X-Ray Chest 1 View Study Review  Images Final [id number] [facility]   06/21/24 08:52 PM CT Head Without Contrast Study Review  Images Final [id number] [facility]   06/12/24 04:40 AM Cardiac monitoring strips Study Review  Final     06/11/24 11:59 PM X-Ray Hip 2 or 3 views Right with Pelvis when performed Study Review  Images Final [id number] [facility]   06/11/24 11:59 PM X-Ray Chest AP Portable Study Review  Images Final [id number] [facility]   06/11/24 10:25 PM CT Cervical Spine Without Contrast Study Review  Images Final [id number] [facility]   06/11/24 10:25 PM CT Maxillofacial Without Contrast Study Review  Images Final [id number] [facility]   06/11/24 10:25 PM CT Head Without Contrast Study Review  Images Final [id number] [facility]   06/03/24 10:50 AM Cardiac monitoring strips Study Review  Final     06/02/24 07:52 PM Cardiac monitoring strips Study Review  Final     06/01/24 10:37 PM Cardiac monitoring  strips Study Review  Final     06/01/24 03:03 PM Cardiac monitoring strips Study Review  Final     05/30/24 02:49 PM Cardiac monitoring strips Study Review  Final     05/29/24 09:47 PM MRI Brain Without Contrast Study Review  Images Final [id number] [facility]   05/29/24 05:23 AM Cardiac monitoring strips Study Review  Final     05/28/24 09:29 PM CTA STROKE MULTI-PHASE Study Review  Images Final [id number] [facility]

## 2024-06-26 NOTE — ASSESSMENT & PLAN NOTE
Patient with Paroxysmal (<7 days) atrial fibrillation which is controlled currently with Beta Blocker. Patient is currently in sinus rhythm.[id number] Score: 3. Anticoagulation indicated. Anticoagulation done with eliquis .

## 2024-06-26 NOTE — PROGRESS NOTES
HD initiated, /. UF net goal set for 2 liters as tolerated.  B/P 140/71, pulse 86, o2 sat 95% on room air. Patient alert , voiced no complaints at this time.

## 2024-06-26 NOTE — ASSESSMENT & PLAN NOTE
57 yo female with BL AKAs, ESRD on HD and other comorbidities admitted with lethargy, anorexia and confusion.  CTH negative for acute findings.  UA + and Ucx with 10-49k CFU C Krusei.  Abd exam and history not cw UTI as cause of cognitive findings/condition but concern for abd process and other etiology causing current condition. Hep C and HIV negative.  CT A/P without acute findings - constipation noted - ? Source of adb pain.      Fully oriented today. Denies any complaints.  Patient deemed to have no infectious cause of her AMS as she has normalized without antimicrobials.        Plan:  1. Monitor clinically  2. Rec laxatives for constipation  3. Abx and antifungals not recommended  4. Seen with ID staff   5. Messaged with primary team, Viry Griffin PA-C  6. Will sign off.

## 2024-06-26 NOTE — PLAN OF CARE

## 2024-06-26 NOTE — MEDICAL/APP STUDENT
"CONSULTATION LIAISON PSYCHIATRY INITIAL EVALUATION    Patient Name: Suyapa Connelly  MRN: [id number]  Patient Class: IP- Inpatient  Admission Date: 6/21/2024  Attending Physician: Frances Bee MD      HPI:   Suyapa Connelly is a 58 y.o. female with past psychiatric history of depression (formally dx 2017) and anxiety (per past notes) & past pertinent medical history of HTN, GERD, HLD, PVD, ESRD on HD, b/l amputation, JEMMA s/p CABG, afib on eliquis admitted to the hospital for Acute encephalopathy likely due to a UTI, now improving.     Psychiatry consulted for acute encephalopathy and recommendations for anxiety and depression.    Per notes yesterday, patient had a fluctuating course where she ripped out her IV and was tearful.    Today on psych exam, patient is tearful but cooperative and was returned to baseline. She is alert and oriented to place and year but not month or day of week. She states that she has been feeling sad recently due to her complicated medical history and has been in and out of the hospital for various issues for the last two months including a fall and a LOC episode. 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 She typically loves to work and help people so these recent health issues and mobility loss has greatly affected her mood. She reports no thoughts of wanting to harm herself or others. She states she doesn't have any current anxiety; however, her past psych notes have stated she has generalized anxiety disorder treated with zoloft. She currently has her psych medications managed by her PCP and does not see a therapist. She is initially reluctant to discuss therapy and seeing an outpatient psychiatrist but warms up to the idea with some counseling.    Collateral with patient's permission:   Randell Connelly-  (in room)- States that " "patient has been taking zoloft for a while but unsure about how long but states that every time she comes into the hospital, they stop the zoloft. He says that she "gets like this every time she comes into the hospital" and was hoping to get her started back on zoloft to help with her mood and anxiety.    Medical Review of Systems:  Pertinent items are noted in HPI.    Psychiatric Review of Systems (is patient experiencing or having changes in):  sleep: yes, difficulty falling asleep and maintaining sleep, sleeps about 2 hours every night  appetite: yes, decreased interest in food because it "doesn't taste good"  weight: no  energy/anergy: yes, low energy  interest/pleasure/anhedonia: yes, previous interest was her work but she can't work anymore  anxiety/panic: no, past notes states she has baseline anxiety  guilty/hopelessness: yes, feels hopeless about her situation and guilty for feeling bad  concentration: no  Anel:no  Psychosis: no  Trauma: no  S.I.B.s/risky behavior: no    Past Psychiatric History:  Previous Medication Trials: yes, trialed lexapro 10 mg-- stopped because of photophobia, currently on sertraline 100 mg but takes it on and off due to recent hospitalizations  Previous Psychiatric Hospitalizations:no   Previous Suicide Attempts: no  History of Violence: no  Outpatient Psychiatrist: no, meds managed by PCP  Family Psychiatric History: yes, mom- depression (not formally diagnosed)    Substance Abuse History (with emphasis over the last 12 months):  Recreational Drugs:  denies current use, past notes states taking marijuana to help with anxiety  Use of Alcohol: denied  Tobacco Use:no  Rehab History:no    Social History:  Marital Status: ,  is Randell  Children: 1, daughter in [place], helps her out  Employment Status/Info:  past mental health counselor, currently not working  :no  Education: some college  Special Ed: no  Housing Status: with spouse  Access to gun: " "no  Psychosocial Stressors: health  Functioning Relationships: good support system,  Randell and daughter helps out    Legal History:  Past Charges/Incarcerations: no  Pending charges:no    Mental Status Exam:  General Appearance: appears stated age, well-developed, well-nourished, adequately groomed, lying in bed  Behavior: normal; cooperative; reasonably friendly, pleasant, and polite; appropriate eye-contact; under good behavioral control  Involuntary Movements and Motor Activity: no abnormal involuntary movements noted; no tics, no tremors, no akathisia, no dystonia, no evidence of tardive dyskinesia; no psychomotor agitation or retardation  Gait and Station: unable to assess - patient lying down or seated  Speech and Language: intact; normal rate, rhythm, volume, tone, and pitch; conversational, spontaneous, and coherent; speaks and understands English proficiently and fluently; repeats words and phrases, no word finding difficulties are noted  Mood: "sad"  Affect: reactive, appropriate to situation and context, full-range, tearful  Thought Process and Associations: linear, goal-directed, organized, logical  Thought Content and Perceptions:: no suicidal or homicidal ideation, no auditory or visual hallucinations, no paranoid ideation, no ideas of reference, no evidence of delusions or psychosis  Sensorium and Orientation: grossly intact, alert, oriented to person and place, oriented to year, not oriented to month or day of week  Recent and Remote Memory: forgetful,   Attention and Concentration: grossly intact, attentive to conversation, able to spell WORLD forwards, unable to spell WORLD backwards  Fund of Knowledge: correctly identifies the , incorrectly identifies the last five Presidents of the [place] (in order)  Insight: intact, demonstrates awareness of illness and situation, good  Judgment: intact, behavior is adequate/appropriate to the circumstances, compliant " with health provider's recommendations and instructions    CAM ICU positive? Did not assess      ASSESSMENT & RECOMMENDATIONS   MDD and Generalized Anxiety Disorder  Defer to primary team for medication management and restarting zoloft.    DELIRIUM  DELIRIUM BEHAVIOR MANAGEMENT  PLEASE utilize CHEMICAL restraints with PRN meds first for agitation. Minimize use of PHYSICAL restraints OR have periods of being out of physical restraints if possible.  Keep window shades open and room lit during day and room dim at night in order to promote normal sleep-wake cycles  Encourage family at bedside. [place] patient often to situation, location, date.  Continue to Limit or Discontinue use of Narcotics, Benzos and Anti-cholinergic medications as they may worsen delirium.  Continue medical workup for causative etiology of Delirium.     RISK ASSESSMENT  NO NEED FOR PEC patient NOT in any imminent danger of hurting self or others and not gravely disabled.     FOLLOW UP  Will follow up while in house    DISPOSITION - once medically cleared:   Defer to medical team    Please contact ON CALL psychiatry service (24/7) for any acute issues that may arise.    Negrita Stokes, MS4   Psychiatry  [facility]  6/26/2024 11:15 AM        --------------------------------------------------------------------------------------------------------------------------------------------------------------------------------------------------------------------------------------    CONTINUED HISTORY & OBJECTIVE clinical data & findings reviewed and noted for above decision making    Past Medical/Surgical History:   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    Osteomyelitis      Osteomyelitis of left foot 04/30/2021    Ulcer of left foot     Vaginal delivery     x1     Past Surgical History:   Procedure Laterality Date    ABOVE-KNEE AMPUTATION Left 5/18/2021    Procedure: AMPUTATION, ABOVE KNEE;  Surgeon: Teddy Huber MD;  Location: [address];  Service: Vascular;  Laterality: Left;    ABOVE-KNEE AMPUTATION Right 3/18/2022    Procedure: AMPUTATION, ABOVE KNEE;  Surgeon: DAYNE Florez II, MD;  Location: [address];  Service: Vascular;  Laterality: Right;    Angiogram - Right Extremity Right 7/9/15    angiogram-left leg  10/6/15    ANGIOGRAPHY OF LOWER EXTREMITY Left 4/29/2021    Procedure: ANGIOGRAM, LOWER EXTREMITY;  Surgeon: Teddy Huber MD;  Location: [address];  Service: Vascular;  Laterality: Left;    BELOW KNEE AMPUTATION OF LOWER EXTREMITY Right 12/28/2021    Procedure: AMPUTATION, BELOW KNEE;  Surgeon: Kaitlyn Rojas MD;  Location: [facility];  Service: General;  Laterality: Right;    CATHETERIZATION OF BOTH LEFT AND RIGHT HEART N/A 12/18/2019    Procedure: CATHETERIZATION, HEART, BOTH LEFT AND RIGHT;  Surgeon: Que Fernando III, MD;  Location: [facility] CATH LAB;  Service: Cardiology;  Laterality: N/A;    CORONARY ANGIOGRAPHY N/A 12/18/2019    Procedure: ANGIOGRAM, CORONARY ARTERY;  Surgeon: Que Fernando III, MD;  Location: [facility] CATH LAB;  Service: Cardiology;  Laterality: N/A;    CORONARY ANGIOGRAPHY INCLUDING BYPASS GRAFTS WITH CATHETERIZATION OF LEFT HEART N/A 7/28/2020    Procedure: ANGIOGRAM, CORONARY, INCLUDING BYPASS GRAFT, WITH LEFT HEART CATHETERIZATION, 9 am;  Surgeon: Rachel Easley MD;  Location: [facility] CATH LAB;  Service: Cardiology;  Laterality: N/A;    CORONARY ARTERY BYPASS GRAFT (CABG) N/A 1/14/2020    Procedure: CORONARY ARTERY BYPASS GRAFT (CABG) x 1     Off Pump;  Surgeon: Huang Altamirano MD;  Location: [address];  Service: Cardiovascular;  Laterality: N/A;    CREATION OF FEMORAL-TIBIAL ARTERY BYPASS Left  4/29/2021    Procedure: CREATION, BYPASS, ARTERIAL, FEMORAL TO ANTERIOR TIBIAL;  Surgeon: Teddy Huber MD;  [facility];  Service: Vascular;  Laterality: Left;    CREATION OF FEMOROPOPLITEAL ARTERIAL BYPASS USING GRAFT Left 8/18/2020    Procedure: CREATION, BYPASS, ARTERIAL, FEMORAL TO POPLITEAL, USING GRAFT, LEFT LOWER EXTREMITY;  Surgeon: Teddy Huber MD;  Location: [facility] OR;  Service: Vascular;  Laterality: Left;  REQUEST 7:15 A.M. START----COVID NEGATIVE ON 8/17  1ST CASE STARTE PER LEANA ON 8/7/2020 @ 942AM-LO  RN PREOP 8/12/2020   T/S-----CLEARED BY CARDS-------PENDING INSURANCE    DEBRIDEMENT OF FOOT Left 9/8/2020    Procedure: DEBRIDEMENT, FOOT;  Surgeon: Rosio Mayes DPM;  Location: [facility] OR;  Service: Podiatry;  Laterality: Left;  request neoxx .   RN Pre Op 9-4-2020, Covid negative on 9/5/20. C A    DEBRIDEMENT OF FOOT  3/4/2021    Procedure: DEBRIDEMENT, FOOT;  Surgeon: Teddy Huber MD;  Location: [facility] OR;  Service: Vascular;;    DEBRIDEMENT OF FOOT Left 3/9/2021    Procedure: DEBRIDEMENT, FOOT, bone biopsy;  Surgeon: Rosio Mayes DPM;  Location: [facility] OR;  Service: Podiatry;  Laterality: Left;  Request neoxx---COVID IN AM  REQUESTING NOON START  RN Phone Pre op.On Blood thinners Plavix and Eliquis.  Covid am of surgery. C A    DEBRIDEMENT OF FOOT Left 5/4/2021    Procedure: DEBRIDEMENT, FOOT;  Surgeon: Farooq Morley DPM;  Location: [facility] OR 2ND FLR;  Service: Podiatry;  Laterality: Left;    INSERTION OF TUNNELED CENTRAL VENOUS HEMODIALYSIS CATHETER N/A 1/27/2020    Procedure: Insertion, Catheter, Central Venous, Hemodialysis;  Surgeon: ESTEBAN Gomez III, MD;  Location: [facility] CATH LAB;  Service: Peripheral Vascular;  Laterality: N/A;    INSERTION OF TUNNELED CENTRAL VENOUS HEMODIALYSIS CATHETER  5/10/2023    Procedure: Insertion, Catheter, Central Venous, Hemodialysis;  Surgeon: Romulo Queen MD;  Location: [facility] CATH LAB;  Service: Cardiology;;     PERCUTANEOUS TRANSLUMINAL ANGIOPLASTY N/A 3/4/2021    Procedure: PTA (ANGIOPLASTY, PERCUTANEOUS, TRANSLUMINAL);  Surgeon: Teddy Huber MD;  Location: [facility] OR;  Service: Vascular;  Laterality: N/A;    REMOVAL OF ARTERIOVENOUS GRAFT Left 5/27/2021    Procedure: REMOVAL, GRAFT, LEFT LOWER EXTREMITY, WOUND EXPLORATION;  Surgeon: Teddy Huber MD;  Location: [facility] OR 2ND FLR;  Service: Vascular;  Laterality: Left;    REMOVAL OF NAIL OF DIGIT Left 3/9/2021    Procedure: REMOVAL, NAIL, DIGIT;  Surgeon: Rosio Mayes DPM;  Location: [facility] OR;  Service: Podiatry;  Laterality: Left;    RIGHT HEART CATHETERIZATION Right 5/10/2023    Procedure: INSERTION, CATHETER, RIGHT HEART;  Surgeon: Romulo Queen MD;  Location: [facility] CATH LAB;  Service: Cardiology;  Laterality: Right;    THROMBECTOMY Left 3/4/2021    Procedure: THROMBECTOMY, LEFT LOWER EXTREMITY BYPASS GRAFT, ANGIOGRAM, POSSIBLE INTERVENTION, POSSIBLE LEFT LOWER EXTREMITY BYPASS;  Surgeon: Teddy Huber MD;  Location: [facility] OR;  Service: Vascular;  Laterality: Left;    THROMBECTOMY Left 4/29/2021    Procedure: GRAFT THROMBECTOMY, LEFT LOWER EXTREMITY;  Surgeon: Teddy Huber MD;  Location: [facility] OR 2ND FLR;  Service: Vascular;  Laterality: Left;  14.5 min  1179.85 mGy  341.01 Gycm2  240 ml dye    THROMBECTOMY  10/22/2021    Procedure: THROMBECTOMY;  Surgeon: Saad Arenas MD;  Location: [facility] CATH LAB/EP;  Service: Cardiology;;       Current Medications:   Scheduled Meds:    0.9% NaCl   Intravenous Once    allopurinoL  50 mg Oral Every other day    apixaban  5 mg Oral BID    atorvastatin  80 mg Oral Daily    calcitRIOL  0.5 mcg Oral Daily    calcium acetate(phosphat bind)  667 mg Oral TID WM    carvediloL  6.25 mg Oral BID    clopidogreL  75 mg Oral Daily    famotidine  20 mg Oral Daily    furosemide  40 mg Oral Daily    gabapentin  100 mg Oral BID    hydrALAZINE  50 mg Oral TID    insulin aspart U-100  3 Units Subcutaneous TIDWM     PRN  Meds:   Current Facility-Administered Medications:     acetaminophen, 1,000 mg, Oral, Q8H PRN    acetaminophen, 650 mg, Oral, Q6H PRN    barium sulfate, 450 mL, Oral, ONCE PRN    dextrose 10%, 12.5 g, Intravenous, PRN    dextrose 10%, 25 g, Intravenous, PRN    diphenhydrAMINE, 25 mg, Oral, Q6H PRN    diphenhydrAMINE, 50 mg, Intramuscular, On Call Procedure    glucagon (human recombinant), 1 mg, Intramuscular, PRN    glucose, 16 g, Oral, PRN    glucose, 24 g, Oral, PRN    heparin (porcine), 1,000 Units, Intra-Catheter, PRN    hydrocortisone, , Topical (Top), BID PRN    hydrOXYzine HCL, 25 mg, Oral, TID PRN    insulin aspart U-100, 0-5 Units, Subcutaneous, QID (AC + HS) PRN    melatonin, 6 mg, Oral, Nightly PRN    naloxone, 0.02 mg, Intravenous, PRN    prochlorperazine, 5 mg, Intravenous, Q6H PRN    sodium chloride 0.9%, 10 mL, Intravenous, Q12H PRN    Allergies:   Review of patient's allergies indicates:   Allergen Reactions    Ciprofloxacin Itching    Iodine      Kidney injury    Pcn [penicillins]      Rash; tolerated ceftriaxone on 1/13/20       Vitals  Vitals:    06/26/24 1100   BP: (!) 126/57   Pulse: 81   Resp: 16   Temp:        Labs/Imaging/Studies:  Recent Results (from the past 24 hour(s))   POCT glucose    Collection Time: 06/25/24  4:58 PM   Result Value Ref Range    POCT Glucose 207 (H) 70 - 110 mg/dL   POCT glucose    Collection Time: 06/25/24  8:53 PM   Result Value Ref Range    POCT Glucose 233 (H) 70 - 110 mg/dL   Urinalysis, Reflex to Urine Culture Urine, Catheterized    Collection Time: 06/26/24  4:37 AM    Specimen: Urine   Result Value Ref Range    Specimen UA Urine, Catheterized     Color, UA Orange (A) Yellow, Straw, Batsheva    Appearance, UA Cloudy (A) Clear    pH, UA 7.0 5.0 - 8.0    Specific Gravity, UA 1.015 1.005 - 1.030    Protein, UA 2+ (A) Negative    Glucose, UA 2+ (A) Negative    Ketones, UA Negative Negative    Bilirubin (UA) Negative Negative    Occult Blood UA 3+ (A) Negative     Nitrite, UA Negative Negative    Leukocytes, UA 3+ (A) Negative   Urinalysis    Collection Time: 06/26/24  4:37 AM   Result Value Ref Range    Specimen UA Urine, Catheterized     Color, UA Orange (A) Yellow, Straw, Batsheva    Appearance, UA Cloudy (A) Clear    pH, UA 7.0 5.0 - 8.0    Specific Gravity, UA 1.015 1.005 - 1.030    Protein, UA 2+ (A) Negative    Glucose, UA 2+ (A) Negative    Ketones, UA Negative Negative    Bilirubin (UA) Negative Negative    Occult Blood UA 3+ (A) Negative    Nitrite, UA Negative Negative    Leukocytes, UA 3+ (A) Negative   Urinalysis Microscopic    Collection Time: 06/26/24  4:37 AM   Result Value Ref Range    RBC, UA >100 (H) 0 - 4 /hpf    WBC, UA >100 (H) 0 - 5 /hpf    WBC Clumps, UA Many (A) None-Rare    Bacteria Rare None-Occ /hpf    Hyaline Casts, UA 0 0-1/lpf /lpf    Microscopic Comment SEE COMMENT    Urinalysis Microscopic    Collection Time: 06/26/24  4:37 AM   Result Value Ref Range    RBC, UA >100 (H) 0 - 4 /hpf    WBC, UA >100 (H) 0 - 5 /hpf    WBC Clumps, UA Many (A) None-Rare    Bacteria Rare None-Occ /hpf    Hyaline Casts, UA 0 0-1/lpf /lpf    Microscopic Comment SEE COMMENT    POCT glucose    Collection Time: 06/26/24  8:00 AM   Result Value Ref Range    POCT Glucose 267 (H) 70 - 110 mg/dL   Phosphorus    Collection Time: 06/26/24  8:37 AM   Result Value Ref Range    Phosphorus 3.7 2.7 - 4.5 mg/dL   Magnesium    Collection Time: 06/26/24  8:37 AM   Result Value Ref Range    Magnesium 2.2 1.6 - 2.6 mg/dL   Comprehensive Metabolic Panel    Collection Time: 06/26/24  8:37 AM   Result Value Ref Range    Sodium 132 (L) 136 - 145 mmol/L    Potassium 4.7 3.5 - 5.1 mmol/L    Chloride 100 95 - 110 mmol/L    CO2 22 (L) 23 - 29 mmol/L    Glucose 252 (H) 70 - 110 mg/dL    BUN 46 (H) 6 - 20 mg/dL    Creatinine 4.6 (H) 0.5 - 1.4 mg/dL    Calcium 9.4 8.7 - 10.5 mg/dL    Total Protein 7.9 6.0 - 8.4 g/dL    Albumin 3.0 (L) 3.5 - 5.2 g/dL    Total Bilirubin 0.8 0.1 - 1.0 mg/dL    Alkaline  Phosphatase 171 (H) 55 - 135 U/L    AST 18 10 - 40 U/L    ALT 8 (L) 10 - 44 U/L    eGFR 10.5 (A) >60 mL/min/1.73 m^2    Anion Gap 10 8 - 16 mmol/L   CBC Auto Differential    Collection Time: 06/26/24  8:37 AM   Result Value Ref Range    WBC 5.11 3.90 - 12.70 K/uL    RBC 2.97 (L) 4.00 - 5.40 M/uL    Hemoglobin 9.7 (L) 12.0 - 16.0 g/dL    Hematocrit 30.4 (L) 37.0 - 48.5 %     (H) 82 - 98 fL    MCH 32.7 (H) 27.0 - 31.0 pg    MCHC 31.9 (L) 32.0 - 36.0 g/dL    RDW 18.4 (H) 11.5 - 14.5 %    Platelets 173 150 - 450 K/uL    MPV 10.9 9.2 - 12.9 fL    Immature Granulocytes 0.2 0.0 - 0.5 %    Gran # (ANC) 3.6 1.8 - 7.7 K/uL    Immature Grans (Abs) 0.01 0.00 - 0.04 K/uL    Lymph # 0.7 (L) 1.0 - 4.8 K/uL    Mono # 0.7 0.3 - 1.0 K/uL    Eos # 0.1 0.0 - 0.5 K/uL    Baso # 0.04 0.00 - 0.20 K/uL    nRBC 0 0 /100 WBC    Gran % 70.8 38.0 - 73.0 %    Lymph % 12.7 (L) 18.0 - 48.0 %    Mono % 13.9 4.0 - 15.0 %    Eosinophil % 1.6 0.0 - 8.0 %    Basophil % 0.8 0.0 - 1.9 %    Differential Method Automated      Imaging Results              X-Ray Chest 1 View (Final result)  Result time 06/22/24 00:54:23      Final result by Monik Lara MD (06/22/24 00:54:23)                   Impression:      Please see above.      Electronically signed by: Monik Lara MD  Date:    06/22/2024  Time:    00:54               Narrative:    EXAMINATION:  XR CHEST 1 VIEW    CLINICAL HISTORY:  infectious work up;    TECHNIQUE:  Single frontal view of the chest was performed.    COMPARISON:  06/11/2024    FINDINGS:  Cardiac monitoring leads overlie the chest.  There is a stably positioned left-sided central venous catheter in place.  Postoperative change of median sternotomy.  There is unchanged enlargement of the cardiomediastinal silhouette noting atherosclerosis of the thoracic aorta.  The lungs are symmetrically expanded with diffuse increased interstitial and parenchymal attenuation similar to prior study.  Findings can be seen with interstitial  edema or infectious process.  Clinical correlation advised.  No large region of confluent airspace consolidation or substantial volume of pleural fluid identified.  Osseous structures are intact.                                       CT Head Without Contrast (Final result)  Result time 06/21/24 21:23:20      Final result by Lino Peter MD (06/21/24 21:23:20)                   Impression:      No acute finding or detrimental change when compared with 06/11/2024, allowing for motion and artifact limitations.    Other findings discussed in the body of the report.      Electronically signed by: Lino Peter MD  Date:    06/21/2024  Time:    21:23               Narrative:    EXAMINATION:  CT HEAD WITHOUT CONTRAST    CLINICAL HISTORY:  Mental status change, unknown cause;    TECHNIQUE:  Low dose axial images were obtained through the head.  Coronal and sagittal reformations were also performed. Contrast was not administered.    COMPARISON:  06/11/2024.    FINDINGS:  Exam quality is limited by motion and streak artifact.    Generalized cerebral volume loss and chronic ischemic changes as seen previously.    No evidence of acute territorial infarct, hemorrhage, mass effect, or midline shift.    Ventricles are prominent and stable when compared with the prior study.  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    No displaced calvarial fracture.    Visualized paranasal sinuses and mastoid air cells are essentially clear.

## 2024-06-27 LAB
BACTERIA UR CULT: ABNORMAL
POCT GLUCOSE: 115 MG/DL (ref 70–110)
POCT GLUCOSE: 116 MG/DL (ref 70–110)
POCT GLUCOSE: 147 MG/DL (ref 70–110)
POCT GLUCOSE: 170 MG/DL (ref 70–110)
POCT GLUCOSE: 81 MG/DL (ref 70–110)

## 2024-06-27 PROCEDURE — 25000003 PHARM REV CODE 250: Mod: HCNC | Performed by: NURSE PRACTITIONER

## 2024-06-27 PROCEDURE — 25000003 PHARM REV CODE 250: Mod: HCNC

## 2024-06-27 PROCEDURE — 11000001 HC ACUTE MED/SURG PRIVATE ROOM: Mod: HCNC

## 2024-06-27 RX ORDER — SODIUM CHLORIDE 9 MG/ML
INJECTION, SOLUTION INTRAVENOUS ONCE
Status: CANCELLED | OUTPATIENT
Start: 2024-06-28

## 2024-06-27 RX ADMIN — GABAPENTIN 100 MG: 100 CAPSULE ORAL at 09:06

## 2024-06-27 RX ADMIN — INSULIN ASPART 3 UNITS: 100 INJECTION, SOLUTION INTRAVENOUS; SUBCUTANEOUS at 11:06

## 2024-06-27 RX ADMIN — APIXABAN 5 MG: 5 TABLET, FILM COATED ORAL at 09:06

## 2024-06-27 RX ADMIN — FAMOTIDINE 20 MG: 20 TABLET ORAL at 09:06

## 2024-06-27 RX ADMIN — SERTRALINE 100 MG: 100 TABLET, FILM COATED ORAL at 09:06

## 2024-06-27 RX ADMIN — TRAZODONE HYDROCHLORIDE 25 MG: 50 TABLET ORAL at 09:06

## 2024-06-27 RX ADMIN — CALCIUM ACETATE 667 MG: 667 CAPSULE ORAL at 11:06

## 2024-06-27 RX ADMIN — CARVEDILOL 6.25 MG: 6.25 TABLET, FILM COATED ORAL at 09:06

## 2024-06-27 RX ADMIN — GABAPENTIN 100 MG: 100 CAPSULE ORAL at 08:06

## 2024-06-27 RX ADMIN — HYDRALAZINE HYDROCHLORIDE 50 MG: 50 TABLET ORAL at 09:06

## 2024-06-27 RX ADMIN — CALCITRIOL 0.5 MCG: 0.5 CAPSULE, LIQUID FILLED ORAL at 09:06

## 2024-06-27 RX ADMIN — CALCIUM ACETATE 667 MG: 667 CAPSULE ORAL at 04:06

## 2024-06-27 RX ADMIN — INSULIN ASPART 3 UNITS: 100 INJECTION, SOLUTION INTRAVENOUS; SUBCUTANEOUS at 09:06

## 2024-06-27 RX ADMIN — CARVEDILOL 6.25 MG: 6.25 TABLET, FILM COATED ORAL at 08:06

## 2024-06-27 RX ADMIN — ATORVASTATIN CALCIUM 80 MG: 40 TABLET, FILM COATED ORAL at 09:06

## 2024-06-27 RX ADMIN — CALCIUM ACETATE 667 MG: 667 CAPSULE ORAL at 09:06

## 2024-06-27 RX ADMIN — CLOPIDOGREL BISULFATE 75 MG: 75 TABLET ORAL at 09:06

## 2024-06-27 RX ADMIN — Medication 6 MG: at 12:06

## 2024-06-27 RX ADMIN — FUROSEMIDE 40 MG: 40 TABLET ORAL at 09:06

## 2024-06-27 RX ADMIN — ACETAMINOPHEN 1000 MG: 500 TABLET ORAL at 09:06

## 2024-06-27 NOTE — PLAN OF CARE
Patient AAOX4. No distress noted. Patient responds to voice or stimuli.  at bed side. Purewick in place. Call light within reach. Bed at lowest position.   Problem: Infection  Goal: Absence of Infection Signs and Symptoms  Outcome: Progressing     Problem: Adult Inpatient Plan of Care  Goal: Plan of Care Review  Outcome: Progressing  Goal: Patient-Specific Goal (Individualized)  Outcome: Progressing  Goal: Absence of Hospital-Acquired Illness or Injury  Outcome: Progressing  Goal: Optimal Comfort and Wellbeing  Outcome: Progressing  Goal: Readiness for Transition of Care  Outcome: Progressing     Problem: Bariatric Environmental Safety  Goal: Safety Maintained with Care  Outcome: Progressing     Problem: Skin Injury Risk Increased  Goal: Skin Health and Integrity  Outcome: Progressing     Problem: Diabetes Comorbidity  Goal: Blood Glucose Level Within Targeted Range  Outcome: Progressing     Problem: Sepsis/Septic Shock  Goal: Optimal Coping  Outcome: Progressing  Goal: Absence of Bleeding  Outcome: Progressing  Goal: Blood Glucose Level Within Targeted Range  Outcome: Progressing  Goal: Absence of Infection Signs and Symptoms  Outcome: Progressing  Goal: Optimal Nutrition Intake  Outcome: Progressing     Problem: Wound  Goal: Optimal Coping  Outcome: Progressing  Goal: Optimal Functional Ability  Outcome: Progressing  Goal: Absence of Infection Signs and Symptoms  Outcome: Progressing  Goal: Improved Oral Intake  Outcome: Progressing  Goal: Optimal Pain Control and Function  Outcome: Progressing  Goal: Skin Health and Integrity  Outcome: Progressing  Goal: Optimal Wound Healing  Outcome: Progressing     Problem: Fall Injury Risk  Goal: Absence of Fall and Fall-Related Injury  Outcome: Progressing     Problem: Pain Acute  Goal: Optimal Pain Control and Function  Outcome: Progressing     Problem: Hemodialysis  Goal: Safe, Effective Therapy Delivery  Outcome: Progressing  Goal: Effective Tissue  Perfusion  Outcome: Progressing  Goal: Absence of Infection Signs and Symptoms  Outcome: Progressing     Problem: Chronic Kidney Disease  Goal: Electrolyte Balance  Outcome: Progressing  Goal: Fluid Balance  Outcome: Progressing

## 2024-06-27 NOTE — NURSING
Patient AAOX4. No distress noted. Patient has a wound in the sacral spine dressed with a silicone dressing clean and intact. Patient has a wound in the left buttocks with barrier cream. Call light within reach. Bed at lowest position.  at bed side.

## 2024-06-27 NOTE — ASSESSMENT & PLAN NOTE
-Pt denies any abd pain, dysuria, urinary frequency, or fever/chills. Per  patient's urine has been foul smelling and cloudy. Per  patient has also been complaining of suprapubic pain and dysuria at home.  -Afebrile, WBC 3.80 (chronic leukopenia).  -UA reviewed, follow urine cx.  -Pt received IV rocephin in the ED, continue for now.  -culture with candida krusei  - ID consulted for recommendations - no tx indicated

## 2024-06-27 NOTE — ASSESSMENT & PLAN NOTE
Patient has acute metabolic encephalopathy that likely secondary to UTI.Treatment for underlying cause is underway. Pt with recurrent admissions for encephalopathy with extensive work ups including EEG, CTH, MRI brain, and vitamin panels. Will monitor neuro status carefully, avoid narcotics and benzos that will exacerbate agitation, and use PRN anti-psychotics for controls of behavior for self harm.  -Initially hypoglycemic which improved with treatment. Na 129.  -UA concerning for infection- growing candida krusei  -CT abd/pelvis - no acute intraabdominal abnormality  -CXR with diffuse increased interstitial and parenchymal attenuation similar to prior study. No large region of confluent airspace consolidation or substantial volume of pleural fluid identified.   -CTH-negative  - ID consulted  1. Monitor clinically  2. Rec laxatives for constipation  3. Abx and antifungals not recommended  4. Seen with ID staff   -Delirium precautions   - reports progressive decline in patient over the last few months. Initiated NH paperwork for patient. CM notified for assistance

## 2024-06-27 NOTE — NURSING
Nurses Note -- 4 Eyes      6/27/2024   2:37 PM      Skin assessed during: Q Shift Change      [] No Altered Skin Integrity Present    []Prevention Measures Documented      [x] Yes- Altered Skin Integrity Present or Discovered   [] LDA Added if Not in Epic (Describe Wound)   [] New Altered Skin Integrity was Present on Admit and Documented in LDA   [x] Wound Image Taken    Wound Care Consulted? Yes    Attending Nurse:  Ruby Lin RN/Staff Member:   Lisa    Active wound care orders are in place. No new wounds noted.

## 2024-06-27 NOTE — ASSESSMENT & PLAN NOTE
Patient's FSGs are not controlled on current hypoglycemics due to hypoglycemia.   Last A1c reviewed-   Lab Results   Component Value Date    HGBA1C 8.0 (H) 04/24/2024     Most recent fingerstick glucose reviewed-   Recent Labs   Lab 06/26/24  1629 06/26/24  2154 06/27/24  0800 06/27/24  1119   POCTGLUCOSE 164* 252* 170* 147*       Current correctional scale  Low  Maintain anti-hyperglycemic dose as follows-   Antihyperglycemics (From admission, onward)    Start     Stop Route Frequency Ordered    06/24/24 1645  insulin aspart U-100 pen 3 Units         -- SubQ 3 times daily with meals 06/24/24 1335    06/22/24 0032  insulin aspart U-100 pen 0-5 Units         -- SubQ Before meals & nightly PRN 06/21/24 2332      -Pt initially hypoglycemic which improved with D10 bolus.   -Accuchecks AC/HS

## 2024-06-27 NOTE — ASSESSMENT & PLAN NOTE
Body mass index is 54.09 kg/m². Morbid obesity complicates all aspects of disease management from diagnostic modalities to treatment.

## 2024-06-27 NOTE — PLAN OF CARE
9:00 AM SW spoke to Bea with [facility] who stated that referral is still under review and will have an update today. EDUARD will continue to follow up.     JENELLE Mahoney  [facility]  Ext. [phone]

## 2024-06-27 NOTE — SUBJECTIVE & OBJECTIVE
Interval History: NAEON. AF, VSS. Patient seen and evaluated by me.  at bedside. Patient alert but minimally conversant. Mental status waxes and wanes throughout the day. Per ID: no abx or antifungals indicated at this time. CM following for dispo.    Review of Systems   Unable to perform ROS: Other (Wax and wanes mental status)   Constitutional:  Negative for chills and fever.   Respiratory:  Negative for chest tightness and shortness of breath.    Cardiovascular:  Negative for chest pain and leg swelling.   Gastrointestinal:  Negative for abdominal pain and nausea.   Neurological:  Negative for dizziness and weakness.     Objective:     Vital Signs (Most Recent):  Temp: 97.8 °F (36.6 °C) (06/27/24 1120)  Pulse: 78 (06/27/24 1120)  Resp: 18 (06/27/24 1120)  BP: 126/77 (06/27/24 1120)  SpO2: 96 % (06/27/24 1120) Vital Signs (24h Range):  Temp:  [96.4 °F (35.8 °C)-99.9 °F (37.7 °C)] 97.8 °F (36.6 °C)  Pulse:  [78-84] 78  Resp:  [17-18] 18  SpO2:  [94 %-97 %] 96 %  BP: (126-132)/(61-77) 126/77     Weight: 80.4 kg (177 lb 4 oz)  Body mass index is 54.09 kg/m².  No intake or output data in the 24 hours ending 06/27/24 1506      Physical Exam  Vitals and nursing note reviewed.   Constitutional:       Appearance: She is well-developed. She is ill-appearing (chronic).   Eyes:      Pupils: Pupils are equal, round, and reactive to light.   Cardiovascular:      Rate and Rhythm: Normal rate and regular rhythm.   Pulmonary:      Effort: Pulmonary effort is normal.      Breath sounds: Normal breath sounds.   Abdominal:      Palpations: Abdomen is soft.      Tenderness: There is no abdominal tenderness.   Musculoskeletal:         General: No tenderness.      Comments: B/L amputations    Skin:     General: Skin is warm and dry.   Neurological:      Mental Status: She is alert.      Comments: Minimally conversant on exam  Moving all extremities spontaneously  Responds to name   Psychiatric:         Behavior: Behavior normal.              Significant Labs: All pertinent labs within the past 24 hours have been reviewed.    Significant Imaging: I have reviewed all pertinent imaging results/findings within the past 24 hours.

## 2024-06-27 NOTE — PLAN OF CARE
Problem: Infection  Goal: Absence of Infection Signs and Symptoms  Outcome: Progressing     Problem: Adult Inpatient Plan of Care  Goal: Plan of Care Review  Outcome: Progressing  Goal: Patient-Specific Goal (Individualized)  Outcome: Progressing  Goal: Absence of Hospital-Acquired Illness or Injury  Outcome: Progressing  Goal: Optimal Comfort and Wellbeing  Outcome: Progressing  Goal: Readiness for Transition of Care  Outcome: Progressing     Problem: Skin Injury Risk Increased  Goal: Skin Health and Integrity  Outcome: Progressing     Problem: Wound  Goal: Optimal Coping  Outcome: Progressing  Goal: Improved Oral Intake  Outcome: Progressing  Goal: Skin Health and Integrity  Outcome: Progressing  Goal: Optimal Wound Healing  Outcome: Progressing

## 2024-06-27 NOTE — ASSESSMENT & PLAN NOTE
Patient with Paroxysmal (<7 days) atrial fibrillation which is controlled currently with Beta Blocker. Patient is currently in sinus rhythm.[id number] Score: 3. Anticoagulation indicated. Anticoagulation done with eliquis .

## 2024-06-27 NOTE — PROGRESS NOTES
[facility] [address] [place] [address] Medicine  Progress Note    Patient Name: Suyapa Connelly  MRN: [id number]  Patient Class: IP- Inpatient   Admission Date: 6/21/2024  Length of Stay: 5 days  Attending Physician: Rodríguez Pratt MD  Primary Care Provider: Feliciano Nguyen III, PA-C        Subjective:     Principal Problem:Acute encephalopathy        HPI:  Suyapa Connelly is a 58 y.o. female with a PMHx of HTN, GERD, HLD, PVD, ESRD on HD MFW, b/l AKAs, CAD s/p CABG, afib on eliquis, depression, and anxiety who presents to the ED for evaluation of confusion. HPI and ROS limited secondary to patient's mental status and poor participation. Per patient's  she has been more sleepy and less interactive over the past several days. He reports that her urine has been foul-smelling and more cloudy. He states she was also peeing more frequently in complaining of burning with urination and some occasional suprapubic pain. Denies any fevers or chills. The patient denies CP, SOB, abdominal pain, or dysuria. Per  patient was dialyzed today a full dialysis and they came straight here after.     In the ED, VSS, afebrile. CBC with stable leukopenia and anemia. Na 129, Chloride 94, Bicarb 22, Glucose 50. POC glucose 150. Cr 2.7 (consistent with ESRD). Albumin 3.2. Tbili 1.1. Mag 2.1. UA with 3+ leukocytosis, 74 WBCs, but no bacteria. CTH with no acute finding or detrimental change when compared with 06/11/2024, allowing for motion and artifact limitations. The patient received 1g IV Rocephin.    Overview/Hospital Course:  Suyapa Connelly is a 58 y.o. F who was admitted to  for further evaluation of acute encephalopathy. Most likely 2/2 UTI, other workup negative. AAOx3 now. Initiated rocephin for infectious appearing UA. Following culture - grew Candida krusei, stopped rocephin. ID consulted for recommendations. Recommend deferring treatment, obtain CT abd/pelvis. CT with findings of volume overload, otherwise no  acute intraabdominal abnormality. Given x1 120mg IV lasix. Repeating UA with straight cath and following cx per ID recs. Nephrology following for HD management: HD performed 6/24 and 6/26. Psych consulted for recommendations: continue zoloft, add trazodone qhs. CM assisting with discharge planning.       Interval History: NAEON. AF, VSS. Patient seen and evaluated by me.  at bedside. Patient alert but minimally conversant. Mental status waxes and wanes throughout the day. Per ID: no abx or antifungals indicated at this time. CM following for dispo.    Review of Systems   Unable to perform ROS: Other (Wax and wanes mental status)   Constitutional:  Negative for chills and fever.   Respiratory:  Negative for chest tightness and shortness of breath.    Cardiovascular:  Negative for chest pain and leg swelling.   Gastrointestinal:  Negative for abdominal pain and nausea.   Neurological:  Negative for dizziness and weakness.     Objective:     Vital Signs (Most Recent):  Temp: 97.8 °F (36.6 °C) (06/27/24 1120)  Pulse: 78 (06/27/24 1120)  Resp: 18 (06/27/24 1120)  BP: 126/77 (06/27/24 1120)  SpO2: 96 % (06/27/24 1120) Vital Signs (24h Range):  Temp:  [96.4 °F (35.8 °C)-99.9 °F (37.7 °C)] 97.8 °F (36.6 °C)  Pulse:  [78-84] 78  Resp:  [17-18] 18  SpO2:  [94 %-97 %] 96 %  BP: (126-132)/(61-77) 126/77     Weight: 80.4 kg (177 lb 4 oz)  Body mass index is 54.09 kg/m².  No intake or output data in the 24 hours ending 06/27/24 1506      Physical Exam  Vitals and nursing note reviewed.   Constitutional:       Appearance: She is well-developed. She is ill-appearing (chronic).   Eyes:      Pupils: Pupils are equal, round, and reactive to light.   Cardiovascular:      Rate and Rhythm: Normal rate and regular rhythm.   Pulmonary:      Effort: Pulmonary effort is normal.      Breath sounds: Normal breath sounds.   Abdominal:      Palpations: Abdomen is soft.      Tenderness: There is no abdominal tenderness.   Musculoskeletal:          General: No tenderness.      Comments: B/L amputations    Skin:     General: Skin is warm and dry.   Neurological:      Mental Status: She is alert.      Comments: Minimally conversant on exam  Moving all extremities spontaneously  Responds to name   Psychiatric:         Behavior: Behavior normal.             Significant Labs: All pertinent labs within the past 24 hours have been reviewed.    Significant Imaging: I have reviewed all pertinent imaging results/findings within the past 24 hours.    Assessment/Plan:      * Acute encephalopathy  Patient has acute metabolic encephalopathy that likely secondary to UTI.Treatment for underlying cause is underway. Pt with recurrent admissions for encephalopathy with extensive work ups including EEG, CTH, MRI brain, and vitamin panels. Will monitor neuro status carefully, avoid narcotics and benzos that will exacerbate agitation, and use PRN anti-psychotics for controls of behavior for self harm.  -Initially hypoglycemic which improved with treatment. Na 129.  -UA concerning for infection- growing candida krusei  -CT abd/pelvis - no acute intraabdominal abnormality  -CXR with diffuse increased interstitial and parenchymal attenuation similar to prior study. No large region of confluent airspace consolidation or substantial volume of pleural fluid identified.   -CTH-negative  - ID consulted  1. Monitor clinically  2. Rec laxatives for constipation  3. Abx and antifungals not recommended  4. Seen with ID staff   -Delirium precautions   - reports progressive decline in patient over the last few months. Initiated NH paperwork for patient. CM notified for assistance     Severe obesity (BMI >= 40)  Body mass index is 54.09 kg/m². Morbid obesity complicates all aspects of disease management from diagnostic modalities to treatment.     ESRD (end stage renal disease)  Chronic kidney disease-mineral and bone disorder   MWF schedule, last dialyzed 6/26  Residual renal  function?- Yes  - Nephrology consulted  - Continue chronic hemodialysis  - Monitor daily electrolytes and defer dialysis orders to nephrology  - Renally dose medications  - Renal diet  - Continue phosphorus binders  - Daily weights/strict I&Os  - 1.5L FR    Debility  Patient with Chronic debility due to other reduced mobility. Latest AMPAC and GEMS scores have not been reviewed. Plan includes progressive mobility protocol initated and fall precautions in place.  - family initiated NH paperwork  - CM notified    S/P AKA (above knee amputation) bilateral  History noted.  -Fall precautions.    Chronic combined systolic and diastolic heart failure  Patient is identified as having Combined Systolic and Diastolic heart failure that is Chronic. CHF is currently controlled. Latest ECHO performed and demonstrates- Results for orders placed during the hospital encounter of 05/07/24    Echo    Interpretation Summary    Left Ventricle: The left ventricle is mildly dilated. Ventricular mass is normal. Normal wall thickness. Severe global hypokinesis present. There is severely reduced systolic function. Ejection fraction by visual approximation is 15%. There is diastolic dysfunction.    Right Ventricle: Severe right ventricular enlargement. Wall thickness is normal. Right ventricle wall motion has global hypokinesis. Systolic function is mildly reduced.    Left Atrium: Left atrium is severely dilated. There is an atrial septal aneurysm.    Right Atrium: Right atrium is dilated.    Aortic Valve: There is mild aortic regurgitation.    Mitral Valve: There is mild regurgitation.    Tricuspid Valve: There is annular dilation present. There is normal leaflet mobility. There is severe functional regurgitation.    Pulmonary Artery: The estimated pulmonary artery systolic pressure is at least 24 mmHg. PASP is likely under-estimated in the setting of severe tricuspid regurgitation.    IVC/SVC: Central venous pressure of at least 8 mmHg.     "No vegetation seen.  . Continue Beta Blocker, lasix and monitor clinical status closely. Monitor on telemetry. Patient is off CHF pathway.  Monitor strict Is&Os and daily weights.  Place on fluid restriction of 1.5 L. Continue to stress to patient importance of self efficacy and  on diet for CHF. Last BNP reviewed- and noted below No results for input(s): "BNP", "BNPTRIAGEBLO" in the last 168 hours..  -Volume management with HD.      Hyponatremia  Patient has hyponatremia which is uncontrolled,We will aim to correct the sodium by 4-6mEq in 24 hours. We will monitor sodium Daily. The hyponatremia is due to Medications: SSRIs and renal insufficiency. We will obtain the following studies: Urine sodium, urine osmolality, serum osmolality. We will treat the hyponatremia with Hemodialysis and Removal of offending medications. The patient's sodium results have been reviewed and are listed below.  No results for input(s): "NA" in the last 24 hours.  - patient with chronic hyponatremia most likely 2/2 ESRD    Paroxysmal atrial fibrillation  Patient with Paroxysmal (<7 days) atrial fibrillation which is controlled currently with Beta Blocker. Patient is currently in sinus rhythm.[id number] Score: 3. Anticoagulation indicated. Anticoagulation done with eliquis .    Anemia due to chronic kidney disease, on chronic dialysis  Patient's anemia is currently controlled. Has not received any PRBCs to date. Etiology likely d/t chronic disease due to ESRD  Current CBC reviewed-   Lab Results   Component Value Date    HGB 9.7 (L) 06/26/2024    HCT 30.4 (L) 06/26/2024     Monitor serial CBC and transfuse if patient becomes hemodynamically unstable, symptomatic or H/H drops below 7/21.    CAD (coronary artery disease)  Patient with known CAD s/p CABG, which is controlled Will continue Plavix and Statin and monitor for S/Sx of angina/ACS. Continue to monitor on telemetry.     Type 2 diabetes mellitus with hyperglycemia, with " long-term current use of insulin  Patient's FSGs are not controlled on current hypoglycemics due to hypoglycemia.   Last A1c reviewed-   Lab Results   Component Value Date    HGBA1C 8.0 (H) 04/24/2024     Most recent fingerstick glucose reviewed-   Recent Labs   Lab 06/26/24  1629 06/26/24  2154 06/27/24  0800 06/27/24  1119   POCTGLUCOSE 164* 252* 170* 147*       Current correctional scale  Low  Maintain anti-hyperglycemic dose as follows-   Antihyperglycemics (From admission, onward)      Start     Stop Route Frequency Ordered    06/24/24 1645  insulin aspart U-100 pen 3 Units         -- SubQ 3 times daily with meals 06/24/24 1335    06/22/24 0032  insulin aspart U-100 pen 0-5 Units         -- SubQ Before meals & nightly PRN 06/21/24 2332        -Pt initially hypoglycemic which improved with D10 bolus.   -Accuchecks AC/HS    Acute cystitis without hematuria  -Pt denies any abd pain, dysuria, urinary frequency, or fever/chills. Per  patient's urine has been foul smelling and cloudy. Per  patient has also been complaining of suprapubic pain and dysuria at home.  -Afebrile, WBC 3.80 (chronic leukopenia).  -UA reviewed, follow urine cx.  -Pt received IV rocephin in the ED, continue for now.  -culture with candida krusei  - ID consulted for recommendations - no tx indicated        CAROLIN (generalized anxiety disorder)  -Chronic, uncontrolled.  -Hold zoloft for now pending hyponatremia work up.   -Can consider psychiatry consult - consult placed:   - recommend continuing zoloft, trazadone 25mg qhs    Peripheral vascular disease  -Chronic issue.  -Continue plavix and statin.      VTE Risk Mitigation (From admission, onward)           Ordered     heparin (porcine) injection 1,000 Units  As needed (PRN)         06/24/24 1001     apixaban tablet 5 mg  2 times daily         06/21/24 2332     IP VTE HIGH RISK PATIENT  Once         06/21/24 2332     Reason for No Pharmacological VTE Prophylaxis  Once         Question:  Reasons:  Answer:  Already adequately anticoagulated on oral Anticoagulants    06/21/24 2332                    Discharge Planning   DEVAN: 6/27/2024     Code Status: Full Code   Is the patient medically ready for discharge?:     Reason for patient still in hospital (select all that apply): Pending disposition  Discharge Plan A: New Nursing Home placement - [facility]          Susan Morris PA-C  Department of Hospital Medicine   [facility] - Observation 11H

## 2024-06-28 LAB
POCT GLUCOSE: 115 MG/DL (ref 70–110)
POCT GLUCOSE: 133 MG/DL (ref 70–110)
POCT GLUCOSE: 176 MG/DL (ref 70–110)
POCT GLUCOSE: 192 MG/DL (ref 70–110)

## 2024-06-28 PROCEDURE — 25000003 PHARM REV CODE 250: Mod: HCNC

## 2024-06-28 PROCEDURE — 63600175 PHARM REV CODE 636 W HCPCS: Mod: HCNC

## 2024-06-28 PROCEDURE — 90935 HEMODIALYSIS ONE EVALUATION: CPT | Mod: HCNC,,, | Performed by: NURSE PRACTITIONER

## 2024-06-28 PROCEDURE — 80100014 HC HEMODIALYSIS 1:1: Mod: HCNC

## 2024-06-28 PROCEDURE — 11000001 HC ACUTE MED/SURG PRIVATE ROOM: Mod: HCNC

## 2024-06-28 PROCEDURE — 25000003 PHARM REV CODE 250: Mod: HCNC | Performed by: NURSE PRACTITIONER

## 2024-06-28 PROCEDURE — 80100016 HC MAINTENANCE HEMODIALYSIS: Mod: HCNC

## 2024-06-28 RX ADMIN — HEPARIN SODIUM 1000 UNITS: 1000 INJECTION, SOLUTION INTRAVENOUS; SUBCUTANEOUS at 02:06

## 2024-06-28 RX ADMIN — ALLOPURINOL 50 MG: 300 TABLET ORAL at 09:06

## 2024-06-28 RX ADMIN — CARVEDILOL 6.25 MG: 6.25 TABLET, FILM COATED ORAL at 08:06

## 2024-06-28 RX ADMIN — APIXABAN 5 MG: 5 TABLET, FILM COATED ORAL at 08:06

## 2024-06-28 RX ADMIN — CLOPIDOGREL BISULFATE 75 MG: 75 TABLET ORAL at 08:06

## 2024-06-28 RX ADMIN — CALCIUM ACETATE 667 MG: 667 CAPSULE ORAL at 05:06

## 2024-06-28 RX ADMIN — FAMOTIDINE 20 MG: 20 TABLET ORAL at 08:06

## 2024-06-28 RX ADMIN — TRAZODONE HYDROCHLORIDE 25 MG: 50 TABLET ORAL at 10:06

## 2024-06-28 RX ADMIN — GABAPENTIN 100 MG: 100 CAPSULE ORAL at 08:06

## 2024-06-28 RX ADMIN — FUROSEMIDE 40 MG: 40 TABLET ORAL at 08:06

## 2024-06-28 RX ADMIN — CALCITRIOL 0.5 MCG: 0.5 CAPSULE, LIQUID FILLED ORAL at 08:06

## 2024-06-28 RX ADMIN — INSULIN ASPART 3 UNITS: 100 INJECTION, SOLUTION INTRAVENOUS; SUBCUTANEOUS at 08:06

## 2024-06-28 RX ADMIN — DIPHENHYDRAMINE HYDROCHLORIDE 25 MG: 25 CAPSULE ORAL at 01:06

## 2024-06-28 RX ADMIN — CARVEDILOL 6.25 MG: 6.25 TABLET, FILM COATED ORAL at 10:06

## 2024-06-28 RX ADMIN — ATORVASTATIN CALCIUM 80 MG: 40 TABLET, FILM COATED ORAL at 08:06

## 2024-06-28 RX ADMIN — ACETAMINOPHEN 1000 MG: 500 TABLET ORAL at 11:06

## 2024-06-28 RX ADMIN — ERYTHROPOIETIN 4000 UNITS: 4000 INJECTION, SOLUTION INTRAVENOUS; SUBCUTANEOUS at 02:06

## 2024-06-28 RX ADMIN — SERTRALINE 100 MG: 100 TABLET, FILM COATED ORAL at 08:06

## 2024-06-28 RX ADMIN — CALCIUM ACETATE 667 MG: 667 CAPSULE ORAL at 08:06

## 2024-06-28 RX ADMIN — SODIUM CHLORIDE: 9 INJECTION, SOLUTION INTRAVENOUS at 01:06

## 2024-06-28 RX ADMIN — HYDRALAZINE HYDROCHLORIDE 50 MG: 50 TABLET ORAL at 10:06

## 2024-06-28 RX ADMIN — ACETAMINOPHEN 1000 MG: 500 TABLET ORAL at 10:06

## 2024-06-28 RX ADMIN — APIXABAN 5 MG: 5 TABLET, FILM COATED ORAL at 10:06

## 2024-06-28 RX ADMIN — INSULIN ASPART 3 UNITS: 100 INJECTION, SOLUTION INTRAVENOUS; SUBCUTANEOUS at 06:06

## 2024-06-28 RX ADMIN — GABAPENTIN 100 MG: 100 CAPSULE ORAL at 10:06

## 2024-06-28 NOTE — PLAN OF CARE
Problem: Infection  Goal: Absence of Infection Signs and Symptoms  Outcome: Progressing     Problem: Adult Inpatient Plan of Care  Goal: Plan of Care Review  Outcome: Progressing  Goal: Absence of Hospital-Acquired Illness or Injury  Outcome: Progressing     Problem: Skin Injury Risk Increased  Goal: Skin Health and Integrity  Outcome: Progressing     Problem: Diabetes Comorbidity  Goal: Blood Glucose Level Within Targeted Range  Outcome: Progressing

## 2024-06-28 NOTE — PLAN OF CARE
11:01 AM EDUARD spoke to Bea with [facility] to follow up on referral. Per Bea referral is under review with the DON and  and will follow up.    3:39 PM EDUARD spoke to [facility] with [facility] who stated that her they will complete assessment at the bedside Monday morning between 9-9:30 am. EDUARD will continue to follow up and notify pt's spouse and treatment team.     JENELLE Mahoney  [facility]  Ext. [phone]

## 2024-06-28 NOTE — PLAN OF CARE
Problem: Hemodialysis  Goal: Safe, Effective Therapy Delivery  Outcome: Progressing  Goal: Effective Tissue Perfusion  Outcome: Progressing  Goal: Absence of Infection Signs and Symptoms  Outcome: Progressing     Dialysis tx ended to the left chest perm, heparin locked, capped.    Net fluid removal: 2L    Prn benadryl given during tx.    Report given to nurse Ruby, pt awaiting transport from [facility] to room 1143.

## 2024-06-28 NOTE — ASSESSMENT & PLAN NOTE
Resolved  Patient has acute metabolic encephalopathy that likely secondary to UTI.Treatment for underlying cause is underway. Pt with recurrent admissions for encephalopathy with extensive work ups including EEG, CTH, MRI brain, and vitamin panels. Will monitor neuro status carefully, avoid narcotics and benzos that will exacerbate agitation, and use PRN anti-psychotics for controls of behavior for self harm.  -UA concerning for infection- growing candida krusei  -CT abd/pelvis - no acute intraabdominal abnormality  -CXR with diffuse increased interstitial and parenchymal attenuation similar to prior study. No large region of confluent airspace consolidation or substantial volume of pleural fluid identified.   -CTH-negative  - ID consulted  1. Monitor clinically  2. Rec laxatives for constipation  3. Abx and antifungals not recommended  4. Seen with ID staff   -Delirium precautions   - reports progressive decline in patient over the last few months. Initiated NH paperwork for patient. CM notified for assistance, patient is medically ready

## 2024-06-28 NOTE — PROGRESS NOTES
[facility] - Observation [address] [place] [address] Medicine  Progress Note    Patient Name: Suyapa Connelly  MRN: [id number]  Patient Class: IP- Inpatient   Admission Date: 6/21/2024  Length of Stay: 6 days  Attending Physician: Rodríguez Pratt MD  Primary Care Provider: Feliciano Nguyen III, PA-C        Subjective:     Principal Problem:Acute encephalopathy        HPI:  Suyapa Connelly is a 58 y.o. female with a PMHx of HTN, GERD, HLD, PVD, ESRD on HD MFW, b/l AKAs, CAD s/p CABG, afib on eliquis, depression, and anxiety who presents to the ED for evaluation of confusion. HPI and ROS limited secondary to patient's mental status and poor participation. Per patient's  she has been more sleepy and less interactive over the past several days. He reports that her urine has been foul-smelling and more cloudy. He states she was also peeing more frequently in complaining of burning with urination and some occasional suprapubic pain. Denies any fevers or chills. The patient denies CP, SOB, abdominal pain, or dysuria. Per  patient was dialyzed today a full dialysis and they came straight here after.     In the ED, VSS, afebrile. CBC with stable leukopenia and anemia. Na 129, Chloride 94, Bicarb 22, Glucose 50. POC glucose 150. Cr 2.7 (consistent with ESRD). Albumin 3.2. Tbili 1.1. Mag 2.1. UA with 3+ leukocytosis, 74 WBCs, but no bacteria. CTH with no acute finding or detrimental change when compared with 06/11/2024, allowing for motion and artifact limitations. The patient received 1g IV Rocephin.    Overview/Hospital Course:  Suyapa Connelly is a 58 y.o. F who was admitted to  for further evaluation of acute encephalopathy. Most likely 2/2 UTI, other workup negative. AAOx3 now. Initiated rocephin for infectious appearing UA. Following culture - grew Candida krusei, stopped rocephin. ID consulted for recommendations. Recommend deferring treatment, obtain CT abd/pelvis. CT with findings of volume overload, otherwise no  acute intraabdominal abnormality. Given x1 120mg IV lasix. Repeating UA with straight cath and following cx per ID recs. Nephrology following for HD management: HD performed 6/24 and 6/26. Psych consulted for recommendations: continue zoloft, add trazodone qhs. CM assisting with discharge planning.       Interval History: NAEON. AF, VSS. Seen in HD. Patient alert and oriented to person, place and year. Not month. No new complaints. Denies any complaints of pain at this time. CM following for dispo. Patient is medically ready.    Review of Systems   Constitutional:  Negative for chills and fever.   Respiratory:  Negative for chest tightness and shortness of breath.    Cardiovascular:  Negative for chest pain and leg swelling.   Gastrointestinal:  Negative for abdominal pain and nausea.   Neurological:  Negative for dizziness and weakness.     Objective:     Vital Signs (Most Recent):  Temp: 98 °F (36.7 °C) (06/28/24 1213)  Pulse: 73 (06/28/24 1330)  Resp: 16 (06/28/24 1213)  BP: (!) 128/58 (06/28/24 1330)  SpO2: 98 % (06/28/24 1213) Vital Signs (24h Range):  Temp:  [98 °F (36.7 °C)-98.3 °F (36.8 °C)] 98 °F (36.7 °C)  Pulse:  [73-91] 73  Resp:  [16-18] 16  SpO2:  [93 %-98 %] 98 %  BP: (117-137)/(55-65) 128/58     Weight: 80.3 kg (177 lb 0.5 oz)  Body mass index is 54.02 kg/m².    Intake/Output Summary (Last 24 hours) at 6/28/2024 1337  Last data filed at 6/28/2024 0528  Gross per 24 hour   Intake --   Output 1000 ml   Net -1000 ml         Physical Exam  Vitals and nursing note reviewed.   Constitutional:       Appearance: She is well-developed.   Eyes:      Pupils: Pupils are equal, round, and reactive to light.   Cardiovascular:      Rate and Rhythm: Normal rate and regular rhythm.   Pulmonary:      Effort: Pulmonary effort is normal.      Breath sounds: Normal breath sounds.   Abdominal:      Palpations: Abdomen is soft.      Tenderness: There is no abdominal tenderness.   Musculoskeletal:         General: No  tenderness.      Comments: B/L LE amputations noted   Skin:     General: Skin is warm and dry.   Neurological:      Mental Status: She is alert.      Comments: Oriented to person, place and year   Psychiatric:         Behavior: Behavior normal.             Significant Labs: All pertinent labs within the past 24 hours have been reviewed.    Significant Imaging: I have reviewed all pertinent imaging results/findings within the past 24 hours.    Assessment/Plan:      * Acute encephalopathy  Resolved  Patient has acute metabolic encephalopathy that likely secondary to UTI.Treatment for underlying cause is underway. Pt with recurrent admissions for encephalopathy with extensive work ups including EEG, CTH, MRI brain, and vitamin panels. Will monitor neuro status carefully, avoid narcotics and benzos that will exacerbate agitation, and use PRN anti-psychotics for controls of behavior for self harm.  -UA concerning for infection- growing candida krusei  -CT abd/pelvis - no acute intraabdominal abnormality  -CXR with diffuse increased interstitial and parenchymal attenuation similar to prior study. No large region of confluent airspace consolidation or substantial volume of pleural fluid identified.   -CTH-negative  - ID consulted  1. Monitor clinically  2. Rec laxatives for constipation  3. Abx and antifungals not recommended  4. Seen with ID staff   -Delirium precautions   - reports progressive decline in patient over the last few months. CM notified for assistance, patient is medically ready    Severe obesity (BMI >= 40)  Body mass index is 54.09 kg/m². Morbid obesity complicates all aspects of disease management from diagnostic modalities to treatment.     ESRD (end stage renal disease)  Chronic kidney disease-mineral and bone disorder   MWF schedule, last dialyzed 6/26  Residual renal function?- Yes  - Nephrology consulted  - Continue chronic hemodialysis  - Monitor daily electrolytes and defer dialysis orders  to nephrology  - Renally dose medications  - Renal diet  - Continue phosphorus binders  - Daily weights/strict I&Os  - 1.5L FR    Debility  Patient with Chronic debility due to other reduced mobility. Latest AMPAC and GEMS scores have not been reviewed. Plan includes progressive mobility protocol initated and fall precautions in place.  - CM notified    S/P AKA (above knee amputation) bilateral  History noted.  -Fall precautions.    Chronic combined systolic and diastolic heart failure  Patient is identified as having Combined Systolic and Diastolic heart failure that is Chronic. CHF is currently controlled. Latest ECHO performed and demonstrates- Results for orders placed during the hospital encounter of 05/07/24    Echo    Interpretation Summary    Left Ventricle: The left ventricle is mildly dilated. Ventricular mass is normal. Normal wall thickness. Severe global hypokinesis present. There is severely reduced systolic function. Ejection fraction by visual approximation is 15%. There is diastolic dysfunction.    Right Ventricle: Severe right ventricular enlargement. Wall thickness is normal. Right ventricle wall motion has global hypokinesis. Systolic function is mildly reduced.    Left Atrium: Left atrium is severely dilated. There is an atrial septal aneurysm.    Right Atrium: Right atrium is dilated.    Aortic Valve: There is mild aortic regurgitation.    Mitral Valve: There is mild regurgitation.    Tricuspid Valve: There is annular dilation present. There is normal leaflet mobility. There is severe functional regurgitation.    Pulmonary Artery: The estimated pulmonary artery systolic pressure is at least 24 mmHg. PASP is likely under-estimated in the setting of severe tricuspid regurgitation.    IVC/SVC: Central venous pressure of at least 8 mmHg.    No vegetation seen.  . Continue Beta Blocker, lasix and monitor clinical status closely. Monitor on telemetry. Patient is off CHF pathway.  Monitor strict Is&Os  "and daily weights.  Place on fluid restriction of 1.5 L. Continue to stress to patient importance of self efficacy and  on diet for CHF. Last BNP reviewed- and noted below No results for input(s): "BNP", "BNPTRIAGEBLO" in the last 168 hours..  -Volume management with HD.      Hyponatremia  Patient has hyponatremia which is uncontrolled,We will aim to correct the sodium by 4-6mEq in 24 hours. We will monitor sodium Daily. The hyponatremia is due to Medications: SSRIs and renal insufficiency. We will obtain the following studies: Urine sodium, urine osmolality, serum osmolality. We will treat the hyponatremia with Hemodialysis and Removal of offending medications. The patient's sodium results have been reviewed and are listed below.  No results for input(s): "NA" in the last 24 hours.  - patient with chronic hyponatremia most likely 2/2 ESRD    Paroxysmal atrial fibrillation  Patient with Paroxysmal (<7 days) atrial fibrillation which is controlled currently with Beta Blocker. Patient is currently in sinus rhythm.[id number] Score: 3. Anticoagulation indicated. Anticoagulation done with eliquis .    Anemia due to chronic kidney disease, on chronic dialysis  Patient's anemia is currently controlled. Has not received any PRBCs to date. Etiology likely d/t chronic disease due to ESRD  Current CBC reviewed-   Lab Results   Component Value Date    HGB 9.7 (L) 06/26/2024    HCT 30.4 (L) 06/26/2024     Monitor serial CBC and transfuse if patient becomes hemodynamically unstable, symptomatic or H/H drops below 7/21.    CAD (coronary artery disease)  Patient with known CAD s/p CABG, which is controlled Will continue Plavix and Statin and monitor for S/Sx of angina/ACS. Continue to monitor on telemetry.     Type 2 diabetes mellitus with hyperglycemia, with long-term current use of insulin  Patient's FSGs are not controlled on current hypoglycemics due to hypoglycemia.   Last A1c reviewed-   Lab Results   Component Value " Date    HGBA1C 8.0 (H) 04/24/2024     Most recent fingerstick glucose reviewed-   Recent Labs   Lab 06/26/24  1629 06/26/24  2154 06/27/24  0800 06/27/24  1119   POCTGLUCOSE 164* 252* 170* 147*       Current correctional scale  Low  Maintain anti-hyperglycemic dose as follows-   Antihyperglycemics (From admission, onward)      Start     Stop Route Frequency Ordered    06/24/24 1645  insulin aspart U-100 pen 3 Units         -- SubQ 3 times daily with meals 06/24/24 1335    06/22/24 0032  insulin aspart U-100 pen 0-5 Units         -- SubQ Before meals & nightly PRN 06/21/24 2332        -Pt initially hypoglycemic which improved with D10 bolus.   -Accuchecks AC/HS    Acute cystitis without hematuria  -Pt denies any abd pain, dysuria, urinary frequency, or fever/chills. Per  patient's urine has been foul smelling and cloudy. Per  patient has also been complaining of suprapubic pain and dysuria at home.  -Afebrile, WBC 3.80 (chronic leukopenia).  -UA reviewed, follow urine cx.  -Pt received IV rocephin in the ED, continue for now.  -culture with candida krusei  - ID consulted for recommendations - no tx indicated        CAROLIN (generalized anxiety disorder)  -Chronic, uncontrolled.  -Hold zoloft for now pending hyponatremia work up.   -Can consider psychiatry consult - consult placed:   - recommend continuing zoloft, trazadone 25mg qhs    Peripheral vascular disease  -Chronic issue.  -Continue plavix and statin.      VTE Risk Mitigation (From admission, onward)           Ordered     heparin (porcine) injection 1,000 Units  As needed (PRN)         06/24/24 1001     apixaban tablet 5 mg  2 times daily         06/21/24 2332     IP VTE HIGH RISK PATIENT  Once         06/21/24 2332     Reason for No Pharmacological VTE Prophylaxis  Once        Question:  Reasons:  Answer:  Already adequately anticoagulated on oral Anticoagulants    06/21/24 2332                    Discharge Planning   DEVAN: 7/1/2024     Code Status:  Full Code   Is the patient medically ready for discharge?:     Reason for patient still in hospital (select all that apply): Pending disposition  Discharge Plan A: New Nursing Home placement - [facility] care facility          Susan Morris PA-C  Department of Hospital Medicine   [facility] - Observation 11H

## 2024-06-28 NOTE — NURSING
Patient/ refused to get the schedule insulin.     0047 Patient received previous refused insulin 3 units. See chart

## 2024-06-28 NOTE — SUBJECTIVE & OBJECTIVE
Interval History: NAEON. AF, VSS. Seen in HD. Patient alert and oriented to person, place and year. Not month. No new complaints. Denies any complaints of pain at this time. CM following for dispo. Patient is medically ready.    Review of Systems   Constitutional:  Negative for chills and fever.   Respiratory:  Negative for chest tightness and shortness of breath.    Cardiovascular:  Negative for chest pain and leg swelling.   Gastrointestinal:  Negative for abdominal pain and nausea.   Neurological:  Negative for dizziness and weakness.     Objective:     Vital Signs (Most Recent):  Temp: 98 °F (36.7 °C) (06/28/24 1213)  Pulse: 73 (06/28/24 1330)  Resp: 16 (06/28/24 1213)  BP: (!) 128/58 (06/28/24 1330)  SpO2: 98 % (06/28/24 1213) Vital Signs (24h Range):  Temp:  [98 °F (36.7 °C)-98.3 °F (36.8 °C)] 98 °F (36.7 °C)  Pulse:  [73-91] 73  Resp:  [16-18] 16  SpO2:  [93 %-98 %] 98 %  BP: (117-137)/(55-65) 128/58     Weight: 80.3 kg (177 lb 0.5 oz)  Body mass index is 54.02 kg/m².    Intake/Output Summary (Last 24 hours) at 6/28/2024 1337  Last data filed at 6/28/2024 0528  Gross per 24 hour   Intake --   Output 1000 ml   Net -1000 ml         Physical Exam  Vitals and nursing note reviewed.   Constitutional:       Appearance: She is well-developed.   Eyes:      Pupils: Pupils are equal, round, and reactive to light.   Cardiovascular:      Rate and Rhythm: Normal rate and regular rhythm.   Pulmonary:      Effort: Pulmonary effort is normal.      Breath sounds: Normal breath sounds.   Abdominal:      Palpations: Abdomen is soft.      Tenderness: There is no abdominal tenderness.   Musculoskeletal:         General: No tenderness.      Comments: B/L LE amputations noted   Skin:     General: Skin is warm and dry.   Neurological:      Mental Status: She is alert.      Comments: Oriented to person, place and year   Psychiatric:         Behavior: Behavior normal.             Significant Labs: All pertinent labs within the past 24  hours have been reviewed.    Significant Imaging: I have reviewed all pertinent imaging results/findings within the past 24 hours.

## 2024-06-28 NOTE — NURSING
Dialysis tx started to the left chest perm cath per orders placed by VENANCIO Morel:    Order Questions    Question Answer   Antibiotics on HD? No   Duration of Treatment 3.5 hours   Dialyzer [id number]   Dialysate Temperature (C) 36.5   Target  mL/min   If unable to maintain flow due to inadequate vascular access patency, patient intolerance (i.e. chest pain, access discomfort) or elevated venous pressure, adjust blood flow rate to a minimum of _____mL/min 100    mL/min   K+ Potassium per Protocol   Ca++ Calcium per Protocol   Na+ Sodium per Protocol   Bicarb Bicarbonate per Protocol   Access to be used Other (please specify)   Target UF 2L   If unable to maintain this UFR due to patient intolerance (i.e. hypotension, chest pain, muscle cramping, nausea or vomiting), adjust UFR to achieve a minimum of _______ liters of UF 0   Fluid Removal Instructions maintain SBP > 90 mmHG

## 2024-06-28 NOTE — PLAN OF CARE
Patient AAOX4. No distress noted. Bed at lowest position. Call light within reach.   Problem: Infection  Goal: Absence of Infection Signs and Symptoms  Outcome: Progressing     Problem: Adult Inpatient Plan of Care  Goal: Plan of Care Review  Outcome: Progressing  Goal: Patient-Specific Goal (Individualized)  Outcome: Progressing  Goal: Absence of Hospital-Acquired Illness or Injury  Outcome: Progressing  Goal: Optimal Comfort and Wellbeing  Outcome: Progressing  Goal: Readiness for Transition of Care  Outcome: Progressing     Problem: Bariatric Environmental Safety  Goal: Safety Maintained with Care  Outcome: Progressing     Problem: Skin Injury Risk Increased  Goal: Skin Health and Integrity  Outcome: Progressing     Problem: Diabetes Comorbidity  Goal: Blood Glucose Level Within Targeted Range  Outcome: Progressing     Problem: Sepsis/Septic Shock  Goal: Optimal Coping  Outcome: Progressing  Goal: Absence of Bleeding  Outcome: Progressing  Goal: Blood Glucose Level Within Targeted Range  Outcome: Progressing  Goal: Absence of Infection Signs and Symptoms  Outcome: Progressing  Goal: Optimal Nutrition Intake  Outcome: Progressing     Problem: Wound  Goal: Optimal Coping  Outcome: Progressing  Goal: Optimal Functional Ability  Outcome: Progressing  Goal: Absence of Infection Signs and Symptoms  Outcome: Progressing  Goal: Improved Oral Intake  Outcome: Progressing  Goal: Optimal Pain Control and Function  Outcome: Progressing  Goal: Skin Health and Integrity  Outcome: Progressing  Goal: Optimal Wound Healing  Outcome: Progressing     Problem: Fall Injury Risk  Goal: Absence of Fall and Fall-Related Injury  Outcome: Progressing     Problem: Pain Acute  Goal: Optimal Pain Control and Function  Outcome: Progressing     Problem: Hemodialysis  Goal: Safe, Effective Therapy Delivery  Outcome: Progressing  Goal: Effective Tissue Perfusion  Outcome: Progressing  Goal: Absence of Infection Signs and Symptoms  Outcome:  Progressing     Problem: Chronic Kidney Disease  Goal: Electrolyte Balance  Outcome: Progressing  Goal: Fluid Balance  Outcome: Progressing

## 2024-06-28 NOTE — PROGRESS NOTES
[facility]FF HEMODIALYSIS NOTE     Patient currently on hemodialysis for removal of uremic toxins and volume.     Patient seen and evaluated on hemodialysis, tolerating treatment, see HD flowsheet for vitals and assessments.     Labs have been reviewed and the dialysate bath has been adjusted.        Assessment/Plan:     -Patient seen on HD, tolerating treatment well, w/o complaints   -UF goal of 2 L as tolerated  -Renal diet, if not NPO   -Strict I/O's and daily weights  -Daily renal function panels  -Keep MAP >65 while on HD   -Hgb goal 10-11, epo with HD  -Continue phos binders  -Will continue to follow while inpatient     Katie Monique, NP   Nephrology

## 2024-06-29 LAB
POCT GLUCOSE: 128 MG/DL (ref 70–110)
POCT GLUCOSE: 146 MG/DL (ref 70–110)
POCT GLUCOSE: 175 MG/DL (ref 70–110)
POCT GLUCOSE: 180 MG/DL (ref 70–110)

## 2024-06-29 PROCEDURE — 25000003 PHARM REV CODE 250: Mod: HCNC

## 2024-06-29 PROCEDURE — 25000003 PHARM REV CODE 250: Mod: HCNC | Performed by: NURSE PRACTITIONER

## 2024-06-29 PROCEDURE — 11000001 HC ACUTE MED/SURG PRIVATE ROOM: Mod: HCNC

## 2024-06-29 RX ORDER — SODIUM CHLORIDE 9 MG/ML
INJECTION, SOLUTION INTRAVENOUS ONCE
Status: CANCELLED | OUTPATIENT
Start: 2024-07-01

## 2024-06-29 RX ADMIN — INSULIN ASPART 3 UNITS: 100 INJECTION, SOLUTION INTRAVENOUS; SUBCUTANEOUS at 08:06

## 2024-06-29 RX ADMIN — CLOPIDOGREL BISULFATE 75 MG: 75 TABLET ORAL at 08:06

## 2024-06-29 RX ADMIN — APIXABAN 5 MG: 5 TABLET, FILM COATED ORAL at 10:06

## 2024-06-29 RX ADMIN — HYDRALAZINE HYDROCHLORIDE 50 MG: 50 TABLET ORAL at 05:06

## 2024-06-29 RX ADMIN — CALCIUM ACETATE 667 MG: 667 CAPSULE ORAL at 12:06

## 2024-06-29 RX ADMIN — ATORVASTATIN CALCIUM 80 MG: 40 TABLET, FILM COATED ORAL at 08:06

## 2024-06-29 RX ADMIN — INSULIN ASPART 3 UNITS: 100 INJECTION, SOLUTION INTRAVENOUS; SUBCUTANEOUS at 12:06

## 2024-06-29 RX ADMIN — APIXABAN 5 MG: 5 TABLET, FILM COATED ORAL at 08:06

## 2024-06-29 RX ADMIN — GABAPENTIN 100 MG: 100 CAPSULE ORAL at 08:06

## 2024-06-29 RX ADMIN — Medication 6 MG: at 10:06

## 2024-06-29 RX ADMIN — FAMOTIDINE 20 MG: 20 TABLET ORAL at 08:06

## 2024-06-29 RX ADMIN — FUROSEMIDE 40 MG: 40 TABLET ORAL at 08:06

## 2024-06-29 RX ADMIN — CARVEDILOL 6.25 MG: 6.25 TABLET, FILM COATED ORAL at 08:06

## 2024-06-29 RX ADMIN — INSULIN ASPART 3 UNITS: 100 INJECTION, SOLUTION INTRAVENOUS; SUBCUTANEOUS at 05:06

## 2024-06-29 RX ADMIN — TRAZODONE HYDROCHLORIDE 25 MG: 50 TABLET ORAL at 10:06

## 2024-06-29 RX ADMIN — GABAPENTIN 100 MG: 100 CAPSULE ORAL at 10:06

## 2024-06-29 RX ADMIN — CALCIUM ACETATE 667 MG: 667 CAPSULE ORAL at 05:06

## 2024-06-29 RX ADMIN — HYDRALAZINE HYDROCHLORIDE 50 MG: 50 TABLET ORAL at 08:06

## 2024-06-29 RX ADMIN — CALCIUM ACETATE 667 MG: 667 CAPSULE ORAL at 08:06

## 2024-06-29 RX ADMIN — CALCITRIOL 0.5 MCG: 0.5 CAPSULE, LIQUID FILLED ORAL at 09:06

## 2024-06-29 RX ADMIN — SERTRALINE 100 MG: 100 TABLET, FILM COATED ORAL at 08:06

## 2024-06-29 RX ADMIN — DIPHENHYDRAMINE HYDROCHLORIDE 25 MG: 25 CAPSULE ORAL at 10:06

## 2024-06-29 NOTE — PROGRESS NOTES
[facility] [address] [place] [address] Medicine  Progress Note    Patient Name: Suyapa Connelly  MRN: [id number]  Patient Class: IP- Inpatient   Admission Date: 6/21/2024  Length of Stay: 7 days  Attending Physician: Rodríguez Pratt MD  Primary Care Provider: Feliciano Nguyen III, PA-C        Subjective:     Principal Problem:Acute encephalopathy        HPI:  Suyapa Connelly is a 58 y.o. female with a PMHx of HTN, GERD, HLD, PVD, ESRD on HD MFW, b/l AKAs, CAD s/p CABG, afib on eliquis, depression, and anxiety who presents to the ED for evaluation of confusion. HPI and ROS limited secondary to patient's mental status and poor participation. Per patient's  she has been more sleepy and less interactive over the past several days. He reports that her urine has been foul-smelling and more cloudy. He states she was also peeing more frequently in complaining of burning with urination and some occasional suprapubic pain. Denies any fevers or chills. The patient denies CP, SOB, abdominal pain, or dysuria. Per  patient was dialyzed today a full dialysis and they came straight here after.     In the ED, VSS, afebrile. CBC with stable leukopenia and anemia. Na 129, Chloride 94, Bicarb 22, Glucose 50. POC glucose 150. Cr 2.7 (consistent with ESRD). Albumin 3.2. Tbili 1.1. Mag 2.1. UA with 3+ leukocytosis, 74 WBCs, but no bacteria. CTH with no acute finding or detrimental change when compared with 06/11/2024, allowing for motion and artifact limitations. The patient received 1g IV Rocephin.    Overview/Hospital Course:  Suyapa Connelly is a 58 y.o. F who was admitted to  for further evaluation of acute encephalopathy. Most likely 2/2 UTI, other workup negative. AAOx3 now. Initiated rocephin for infectious appearing UA. Following culture - grew Candida krusei, stopped rocephin. ID consulted for recommendations. Recommend deferring treatment, obtain CT abd/pelvis. CT with findings of volume overload, otherwise no  acute intraabdominal abnormality. Given x1 120mg IV lasix. Repeating UA with straight cath and following cx per ID recs. Nephrology following for HD management: HD performed 6/24 and 6/26. Psych consulted for recommendations: continue zoloft, add trazodone qhs. CM assisting with discharge planning.       Interval History: NAEON. AF, VSS. . Patient alert and oriented to person, place and year. No new complaints. Denies any complaints of pain at this time. CM following for dispo. Patient is medically ready.    Review of Systems   Constitutional:  Negative for chills and fever.   Respiratory:  Negative for chest tightness and shortness of breath.    Cardiovascular:  Negative for chest pain and leg swelling.   Gastrointestinal:  Negative for abdominal pain and nausea.   Neurological:  Negative for dizziness and weakness.     Objective:     Vital Signs (Most Recent):  Temp: 98.1 °F (36.7 °C) (06/29/24 1618)  Pulse: 75 (06/29/24 1618)  Resp: 18 (06/29/24 1618)  BP: (!) 148/68 (06/29/24 1618)  SpO2: 100 % (06/29/24 1618) Vital Signs (24h Range):  Temp:  [97.2 °F (36.2 °C)-98.6 °F (37 °C)] 98.1 °F (36.7 °C)  Pulse:  [70-75] 75  Resp:  [17-18] 18  SpO2:  [93 %-100 %] 100 %  BP: (114-154)/(55-82) 148/68     Weight: 80.2 kg (176 lb 12.9 oz)  Body mass index is 53.95 kg/m².  No intake or output data in the 24 hours ending 06/29/24 1814        Physical Exam  Vitals and nursing note reviewed.   Constitutional:       Appearance: She is well-developed.   Eyes:      Pupils: Pupils are equal, round, and reactive to light.   Cardiovascular:      Rate and Rhythm: Normal rate and regular rhythm.   Pulmonary:      Effort: Pulmonary effort is normal.      Breath sounds: Normal breath sounds.   Abdominal:      Palpations: Abdomen is soft.      Tenderness: There is no abdominal tenderness.   Musculoskeletal:         General: No tenderness.      Comments: B/L LE amputations noted   Skin:     General: Skin is warm and dry.   Neurological:       Mental Status: She is alert.      Comments: Oriented to person, place and year   Psychiatric:         Behavior: Behavior normal.             Significant Labs: All pertinent labs within the past 24 hours have been reviewed.    Significant Imaging: I have reviewed all pertinent imaging results/findings within the past 24 hours.    Assessment/Plan:      * Acute encephalopathy  Resolved  Patient has acute metabolic encephalopathy that likely secondary to UTI.Treatment for underlying cause is underway. Pt with recurrent admissions for encephalopathy with extensive work ups including EEG, CTH, MRI brain, and vitamin panels. Will monitor neuro status carefully, avoid narcotics and benzos that will exacerbate agitation, and use PRN anti-psychotics for controls of behavior for self harm.  -UA concerning for infection- growing candida krusei  -CT abd/pelvis - no acute intraabdominal abnormality  -CXR with diffuse increased interstitial and parenchymal attenuation similar to prior study. No large region of confluent airspace consolidation or substantial volume of pleural fluid identified.   -CTH-negative  - ID consulted  1. Monitor clinically  2. Rec laxatives for constipation  3. Abx and antifungals not recommended  4. Seen with ID staff   -Delirium precautions   - reports progressive decline in patient over the last few months. Initiated NH paperwork for patient. CM notified for assistance, patient is medically ready    Severe obesity (BMI >= 40)  Body mass index is 54.09 kg/m². Morbid obesity complicates all aspects of disease management from diagnostic modalities to treatment.     ESRD (end stage renal disease)  Chronic kidney disease-mineral and bone disorder   MWF schedule, last dialyzed 6/26  Residual renal function?- Yes  - Nephrology consulted  - Continue chronic hemodialysis  - Monitor daily electrolytes and defer dialysis orders to nephrology  - Renally dose medications  - Renal diet  - Continue phosphorus  binders  - Daily weights/strict I&Os  - 1.5L FR    Debility  Patient with Chronic debility due to other reduced mobility. Latest AMPAC and GEMS scores have not been reviewed. Plan includes progressive mobility protocol initated and fall precautions in place.  - family initiated NH paperwork  - CM notified    S/P AKA (above knee amputation) bilateral  History noted.  -Fall precautions.    Chronic combined systolic and diastolic heart failure  Patient is identified as having Combined Systolic and Diastolic heart failure that is Chronic. CHF is currently controlled. Latest ECHO performed and demonstrates- Results for orders placed during the hospital encounter of 05/07/24    Echo    Interpretation Summary    Left Ventricle: The left ventricle is mildly dilated. Ventricular mass is normal. Normal wall thickness. Severe global hypokinesis present. There is severely reduced systolic function. Ejection fraction by visual approximation is 15%. There is diastolic dysfunction.    Right Ventricle: Severe right ventricular enlargement. Wall thickness is normal. Right ventricle wall motion has global hypokinesis. Systolic function is mildly reduced.    Left Atrium: Left atrium is severely dilated. There is an atrial septal aneurysm.    Right Atrium: Right atrium is dilated.    Aortic Valve: There is mild aortic regurgitation.    Mitral Valve: There is mild regurgitation.    Tricuspid Valve: There is annular dilation present. There is normal leaflet mobility. There is severe functional regurgitation.    Pulmonary Artery: The estimated pulmonary artery systolic pressure is at least 24 mmHg. PASP is likely under-estimated in the setting of severe tricuspid regurgitation.    IVC/SVC: Central venous pressure of at least 8 mmHg.    No vegetation seen.  . Continue Beta Blocker, lasix and monitor clinical status closely. Monitor on telemetry. Patient is off CHF pathway.  Monitor strict Is&Os and daily weights.  Place on fluid  "restriction of 1.5 L. Continue to stress to patient importance of self efficacy and  on diet for CHF. Last BNP reviewed- and noted below No results for input(s): "BNP", "BNPTRIAGEBLO" in the last 168 hours..  -Volume management with HD.      Hyponatremia  Patient has hyponatremia which is uncontrolled,We will aim to correct the sodium by 4-6mEq in 24 hours. We will monitor sodium Daily. The hyponatremia is due to Medications: SSRIs and renal insufficiency. We will obtain the following studies: Urine sodium, urine osmolality, serum osmolality. We will treat the hyponatremia with Hemodialysis and Removal of offending medications. The patient's sodium results have been reviewed and are listed below.  No results for input(s): "NA" in the last 24 hours.  - patient with chronic hyponatremia most likely 2/2 ESRD    Paroxysmal atrial fibrillation  Patient with Paroxysmal (<7 days) atrial fibrillation which is controlled currently with Beta Blocker. Patient is currently in sinus rhythm.[id number] Score: 3. Anticoagulation indicated. Anticoagulation done with eliquis .    Anemia due to chronic kidney disease, on chronic dialysis  Patient's anemia is currently controlled. Has not received any PRBCs to date. Etiology likely d/t chronic disease due to ESRD  Current CBC reviewed-   Lab Results   Component Value Date    HGB 9.7 (L) 06/26/2024    HCT 30.4 (L) 06/26/2024     Monitor serial CBC and transfuse if patient becomes hemodynamically unstable, symptomatic or H/H drops below 7/21.    CAD (coronary artery disease)  Patient with known CAD s/p CABG, which is controlled Will continue Plavix and Statin and monitor for S/Sx of angina/ACS. Continue to monitor on telemetry.     Type 2 diabetes mellitus with hyperglycemia, with long-term current use of insulin  Patient's FSGs are not controlled on current hypoglycemics due to hypoglycemia.   Last A1c reviewed-   Lab Results   Component Value Date    HGBA1C 8.0 (H) 04/24/2024 "     Most recent fingerstick glucose reviewed-   Recent Labs   Lab 06/26/24  1629 06/26/24  2154 06/27/24  0800 06/27/24  1119   POCTGLUCOSE 164* 252* 170* 147*       Current correctional scale  Low  Maintain anti-hyperglycemic dose as follows-   Antihyperglycemics (From admission, onward)      Start     Stop Route Frequency Ordered    06/24/24 1645  insulin aspart U-100 pen 3 Units         -- SubQ 3 times daily with meals 06/24/24 1335    06/22/24 0032  insulin aspart U-100 pen 0-5 Units         -- SubQ Before meals & nightly PRN 06/21/24 2332        -Pt initially hypoglycemic which improved with D10 bolus.   -Accuchecks AC/HS    Acute cystitis without hematuria  -Pt denies any abd pain, dysuria, urinary frequency, or fever/chills. Per  patient's urine has been foul smelling and cloudy. Per  patient has also been complaining of suprapubic pain and dysuria at home.  -Afebrile, WBC 3.80 (chronic leukopenia).  -UA reviewed, follow urine cx.  -Pt received IV rocephin in the ED, continue for now.  -culture with candida krusei  - ID consulted for recommendations - no tx indicated        CAROLIN (generalized anxiety disorder)  -Chronic, uncontrolled.  -Hold zoloft for now pending hyponatremia work up.   -Can consider psychiatry consult - consult placed:   - recommend continuing zoloft, trazadone 25mg qhs    Peripheral vascular disease  -Chronic issue.  -Continue plavix and statin.      VTE Risk Mitigation (From admission, onward)           Ordered     heparin (porcine) injection 1,000 Units  As needed (PRN)         06/24/24 1001     apixaban tablet 5 mg  2 times daily         06/21/24 2332     IP VTE HIGH RISK PATIENT  Once         06/21/24 2332     Reason for No Pharmacological VTE Prophylaxis  Once        Question:  Reasons:  Answer:  Already adequately anticoagulated on oral Anticoagulants    06/21/24 2332                    Discharge Planning   DEVAN: 7/1/2024     Code Status: Full Code   Is the patient  medically ready for discharge?:     Reason for patient still in hospital (select all that apply): Pending disposition  Discharge Plan A: New Nursing Home placement - halfway care facility                  Rodríguez Pratt MD  Department of Hospital Medicine   [facility]

## 2024-06-29 NOTE — PLAN OF CARE
Problem: Infection  Goal: Absence of Infection Signs and Symptoms  Outcome: Progressing     Problem: Adult Inpatient Plan of Care  Goal: Absence of Hospital-Acquired Illness or Injury  Outcome: Progressing  Goal: Optimal Comfort and Wellbeing  Outcome: Progressing     Problem: Skin Injury Risk Increased  Goal: Skin Health and Integrity  Outcome: Progressing     Problem: Diabetes Comorbidity  Goal: Blood Glucose Level Within Targeted Range  Outcome: Progressing

## 2024-06-29 NOTE — SUBJECTIVE & OBJECTIVE
Interval History: NAEON. AF, VSS. . Patient alert and oriented to person, place and year. No new complaints. Denies any complaints of pain at this time. CM following for dispo. Patient is medically ready.    Review of Systems   Constitutional:  Negative for chills and fever.   Respiratory:  Negative for chest tightness and shortness of breath.    Cardiovascular:  Negative for chest pain and leg swelling.   Gastrointestinal:  Negative for abdominal pain and nausea.   Neurological:  Negative for dizziness and weakness.     Objective:     Vital Signs (Most Recent):  Temp: 98.1 °F (36.7 °C) (06/29/24 1618)  Pulse: 75 (06/29/24 1618)  Resp: 18 (06/29/24 1618)  BP: (!) 148/68 (06/29/24 1618)  SpO2: 100 % (06/29/24 1618) Vital Signs (24h Range):  Temp:  [97.2 °F (36.2 °C)-98.6 °F (37 °C)] 98.1 °F (36.7 °C)  Pulse:  [70-75] 75  Resp:  [17-18] 18  SpO2:  [93 %-100 %] 100 %  BP: (114-154)/(55-82) 148/68     Weight: 80.2 kg (176 lb 12.9 oz)  Body mass index is 53.95 kg/m².  No intake or output data in the 24 hours ending 06/29/24 1814        Physical Exam  Vitals and nursing note reviewed.   Constitutional:       Appearance: She is well-developed.   Eyes:      Pupils: Pupils are equal, round, and reactive to light.   Cardiovascular:      Rate and Rhythm: Normal rate and regular rhythm.   Pulmonary:      Effort: Pulmonary effort is normal.      Breath sounds: Normal breath sounds.   Abdominal:      Palpations: Abdomen is soft.      Tenderness: There is no abdominal tenderness.   Musculoskeletal:         General: No tenderness.      Comments: B/L LE amputations noted   Skin:     General: Skin is warm and dry.   Neurological:      Mental Status: She is alert.      Comments: Oriented to person, place and year   Psychiatric:         Behavior: Behavior normal.             Significant Labs: All pertinent labs within the past 24 hours have been reviewed.    Significant Imaging: I have reviewed all pertinent imaging results/findings  within the past 24 hours.

## 2024-06-30 LAB
ERYTHROCYTE [DISTWIDTH] IN BLOOD BY AUTOMATED COUNT: 18.5 % (ref 11.5–14.5)
HCT VFR BLD AUTO: 32.3 % (ref 37–48.5)
HGB BLD-MCNC: 10 G/DL (ref 12–16)
MCH RBC QN AUTO: 32.6 PG (ref 27–31)
MCHC RBC AUTO-ENTMCNC: 31 G/DL (ref 32–36)
MCV RBC AUTO: 105 FL (ref 82–98)
PLATELET # BLD AUTO: 158 K/UL (ref 150–450)
PMV BLD AUTO: 11.3 FL (ref 9.2–12.9)
POCT GLUCOSE: 116 MG/DL (ref 70–110)
POCT GLUCOSE: 155 MG/DL (ref 70–110)
POCT GLUCOSE: 198 MG/DL (ref 70–110)
POCT GLUCOSE: 215 MG/DL (ref 70–110)
RBC # BLD AUTO: 3.07 M/UL (ref 4–5.4)
WBC # BLD AUTO: 4.24 K/UL (ref 3.9–12.7)

## 2024-06-30 PROCEDURE — 11000001 HC ACUTE MED/SURG PRIVATE ROOM: Mod: HCNC

## 2024-06-30 PROCEDURE — 25000003 PHARM REV CODE 250: Mod: HCNC

## 2024-06-30 PROCEDURE — 36415 COLL VENOUS BLD VENIPUNCTURE: CPT | Mod: HCNC | Performed by: INTERNAL MEDICINE

## 2024-06-30 PROCEDURE — 85027 COMPLETE CBC AUTOMATED: CPT | Mod: HCNC | Performed by: INTERNAL MEDICINE

## 2024-06-30 PROCEDURE — 25000003 PHARM REV CODE 250: Mod: HCNC | Performed by: NURSE PRACTITIONER

## 2024-06-30 RX ORDER — HEPARIN SODIUM 1000 [USP'U]/ML
1000 INJECTION, SOLUTION INTRAVENOUS; SUBCUTANEOUS
Status: CANCELLED | OUTPATIENT
Start: 2024-07-01 | End: 2024-07-02

## 2024-06-30 RX ORDER — SODIUM CHLORIDE 9 MG/ML
INJECTION, SOLUTION INTRAVENOUS ONCE
Status: CANCELLED | OUTPATIENT
Start: 2024-07-01 | End: 2024-07-01

## 2024-06-30 RX ADMIN — Medication 6 MG: at 08:06

## 2024-06-30 RX ADMIN — TRAZODONE HYDROCHLORIDE 25 MG: 50 TABLET ORAL at 08:06

## 2024-06-30 RX ADMIN — HYDRALAZINE HYDROCHLORIDE 50 MG: 50 TABLET ORAL at 09:06

## 2024-06-30 RX ADMIN — ATORVASTATIN CALCIUM 80 MG: 40 TABLET, FILM COATED ORAL at 09:06

## 2024-06-30 RX ADMIN — ACETAMINOPHEN 1000 MG: 500 TABLET ORAL at 09:06

## 2024-06-30 RX ADMIN — INSULIN ASPART 3 UNITS: 100 INJECTION, SOLUTION INTRAVENOUS; SUBCUTANEOUS at 04:06

## 2024-06-30 RX ADMIN — APIXABAN 5 MG: 5 TABLET, FILM COATED ORAL at 08:06

## 2024-06-30 RX ADMIN — CALCITRIOL 0.5 MCG: 0.5 CAPSULE, LIQUID FILLED ORAL at 09:06

## 2024-06-30 RX ADMIN — GABAPENTIN 100 MG: 100 CAPSULE ORAL at 08:06

## 2024-06-30 RX ADMIN — CALCIUM ACETATE 667 MG: 667 CAPSULE ORAL at 04:06

## 2024-06-30 RX ADMIN — INSULIN ASPART 2 UNITS: 100 INJECTION, SOLUTION INTRAVENOUS; SUBCUTANEOUS at 04:06

## 2024-06-30 RX ADMIN — FUROSEMIDE 40 MG: 40 TABLET ORAL at 09:06

## 2024-06-30 RX ADMIN — INSULIN ASPART 3 UNITS: 100 INJECTION, SOLUTION INTRAVENOUS; SUBCUTANEOUS at 09:06

## 2024-06-30 RX ADMIN — INSULIN ASPART 3 UNITS: 100 INJECTION, SOLUTION INTRAVENOUS; SUBCUTANEOUS at 12:06

## 2024-06-30 RX ADMIN — CALCIUM ACETATE 667 MG: 667 CAPSULE ORAL at 09:06

## 2024-06-30 RX ADMIN — APIXABAN 5 MG: 5 TABLET, FILM COATED ORAL at 09:06

## 2024-06-30 RX ADMIN — CLOPIDOGREL BISULFATE 75 MG: 75 TABLET ORAL at 09:06

## 2024-06-30 RX ADMIN — CARVEDILOL 6.25 MG: 6.25 TABLET, FILM COATED ORAL at 09:06

## 2024-06-30 RX ADMIN — FAMOTIDINE 20 MG: 20 TABLET ORAL at 09:06

## 2024-06-30 RX ADMIN — GABAPENTIN 100 MG: 100 CAPSULE ORAL at 09:06

## 2024-06-30 RX ADMIN — ALLOPURINOL 50 MG: 300 TABLET ORAL at 09:06

## 2024-06-30 RX ADMIN — SERTRALINE 100 MG: 100 TABLET, FILM COATED ORAL at 09:06

## 2024-06-30 RX ADMIN — CALCIUM ACETATE 667 MG: 667 CAPSULE ORAL at 12:06

## 2024-06-30 NOTE — PLAN OF CARE
Problem: Infection  Goal: Absence of Infection Signs and Symptoms  Outcome: Progressing     Problem: Adult Inpatient Plan of Care  Goal: Plan of Care Review  Outcome: Progressing  Goal: Patient-Specific Goal (Individualized)  Outcome: Progressing  Goal: Absence of Hospital-Acquired Illness or Injury  Outcome: Progressing  Goal: Optimal Comfort and Wellbeing  Outcome: Progressing     Problem: Bariatric Environmental Safety  Goal: Safety Maintained with Care  Outcome: Progressing     Problem: Skin Injury Risk Increased  Goal: Skin Health and Integrity  Outcome: Progressing     Problem: Diabetes Comorbidity  Goal: Blood Glucose Level Within Targeted Range  Outcome: Progressing     Problem: Wound  Goal: Optimal Coping  Outcome: Progressing  Goal: Optimal Wound Healing  Outcome: Progressing     Problem: Fall Injury Risk  Goal: Absence of Fall and Fall-Related Injury  Outcome: Progressing

## 2024-06-30 NOTE — SUBJECTIVE & OBJECTIVE
Interval History:     NAEON.     Met with patient and  bedside.  Agree to wait for final determination Samuel tomorrow.  Prepared for home DC.  Open to [facility], home services.  Contemplative of more comfort focused care plan.  No changes for now.      Review of Systems   Constitutional:  Positive for fatigue. Negative for activity change, appetite change, chills, diaphoresis and fever.   HENT:  Negative for congestion, facial swelling, hearing loss, nosebleeds, sinus pressure, sinus pain, sneezing, sore throat, tinnitus, trouble swallowing and voice change.    Eyes:  Negative for photophobia, pain, discharge and visual disturbance.   Respiratory:  Negative for cough, chest tightness, shortness of breath and wheezing.    Cardiovascular:  Negative for chest pain, palpitations and leg swelling.   Gastrointestinal:  Negative for abdominal distention, abdominal pain, blood in stool, constipation, diarrhea, nausea and vomiting.   Endocrine: Negative for cold intolerance, heat intolerance and polyuria.   Genitourinary:  Negative for decreased urine volume, difficulty urinating, dysuria, flank pain, frequency and urgency.   Musculoskeletal:  Negative for arthralgias, gait problem, myalgias, neck pain and neck stiffness.   Skin:  Negative for rash and wound.   Neurological:  Positive for weakness. Negative for dizziness, speech difficulty, light-headedness and headaches.   Hematological:  Negative for adenopathy. Does not bruise/bleed easily.   Psychiatric/Behavioral:  Negative for agitation, confusion, self-injury, sleep disturbance and suicidal ideas. The patient is not nervous/anxious.      Objective:     Vital Signs (Most Recent):  Temp: 97.8 °F (36.6 °C) (06/30/24 1150)  Pulse: 75 (06/30/24 1150)  Resp: 18 (06/30/24 1150)  BP: (!) 111/56 (06/30/24 1150)  SpO2: (!) 91 % (06/30/24 1150) Vital Signs (24h Range):  Temp:  [97.8 °F (36.6 °C)-98.4 °F (36.9 °C)] 97.8 °F (36.6 °C)  Pulse:  [72-79] 75  Resp:  [17-18]  18  SpO2:  [91 %-100 %] 91 %  BP: (110-150)/(56-78) 111/56     Weight: 80.6 kg (177 lb 11.1 oz)  Body mass index is 54.22 kg/m².    Intake/Output Summary (Last 24 hours) at 6/30/2024 1436  Last data filed at 6/30/2024 1200  Gross per 24 hour   Intake 280 ml   Output --   Net 280 ml         Physical Exam  Vitals and nursing note reviewed.   Constitutional:       Appearance: She is well-developed.   Eyes:      Pupils: Pupils are equal, round, and reactive to light.   Cardiovascular:      Rate and Rhythm: Normal rate and regular rhythm.   Pulmonary:      Effort: Pulmonary effort is normal.      Breath sounds: Normal breath sounds.   Abdominal:      Palpations: Abdomen is soft.      Tenderness: There is no abdominal tenderness.   Musculoskeletal:         General: No tenderness.      Comments: B/L LE amputations noted   Skin:     General: Skin is warm and dry.   Neurological:      Mental Status: She is alert.      Comments: Oriented to person, place and year   Psychiatric:         Behavior: Behavior normal.             Significant Labs: All pertinent labs within the past 24 hours have been reviewed.    Significant Imaging: I have reviewed all pertinent imaging results/findings within the past 24 hours.

## 2024-06-30 NOTE — PROGRESS NOTES
[facility] [address] [place] [address] Medicine  Progress Note    Patient Name: Suyapa Connelly  MRN: [id number]  Patient Class: IP- Inpatient   Admission Date: 6/21/2024  Length of Stay: 8 days  Attending Physician: Rodríguez Pratt MD  Primary Care Provider: Feliciano Nguyen III, PA-C        Subjective:     Principal Problem:Acute encephalopathy        HPI:  Suyapa Connelly is a 58 y.o. female with a PMHx of HTN, GERD, HLD, PVD, ESRD on HD MFW, b/l AKAs, CAD s/p CABG, afib on eliquis, depression, and anxiety who presents to the ED for evaluation of confusion. HPI and ROS limited secondary to patient's mental status and poor participation. Per patient's  she has been more sleepy and less interactive over the past several days. He reports that her urine has been foul-smelling and more cloudy. He states she was also peeing more frequently in complaining of burning with urination and some occasional suprapubic pain. Denies any fevers or chills. The patient denies CP, SOB, abdominal pain, or dysuria. Per  patient was dialyzed today a full dialysis and they came straight here after.     In the ED, VSS, afebrile. CBC with stable leukopenia and anemia. Na 129, Chloride 94, Bicarb 22, Glucose 50. POC glucose 150. Cr 2.7 (consistent with ESRD). Albumin 3.2. Tbili 1.1. Mag 2.1. UA with 3+ leukocytosis, 74 WBCs, but no bacteria. CTH with no acute finding or detrimental change when compared with 06/11/2024, allowing for motion and artifact limitations. The patient received 1g IV Rocephin.    Overview/Hospital Course:  Suyapa Connelly is a 58 y.o. F who was admitted to  for further evaluation of acute encephalopathy. Most likely 2/2 UTI, other workup negative. AAOx3 now. Initiated rocephin for infectious appearing UA. Following culture - grew Candida krusei, stopped rocephin. ID consulted for recommendations. Recommend deferring treatment, obtain CT abd/pelvis. CT with findings of volume overload, otherwise no  acute intraabdominal abnormality. Given x1 120mg IV lasix. Repeating UA with straight cath and following cx per ID recs. Nephrology following for HD management: HD performed 6/24 and 6/26. Psych consulted for recommendations: continue zoloft, add trazodone qhs. CM assisting with discharge planning.       Interval History:     NAEON.     Met with patient and  bedside.  Agree to wait for final determination Baker tomorrow.  Prepared for home DC.  Open to [facility], home services.  Contemplative of more comfort focused care plan.  No changes for now.      Review of Systems   Constitutional:  Positive for fatigue. Negative for activity change, appetite change, chills, diaphoresis and fever.   HENT:  Negative for congestion, facial swelling, hearing loss, nosebleeds, sinus pressure, sinus pain, sneezing, sore throat, tinnitus, trouble swallowing and voice change.    Eyes:  Negative for photophobia, pain, discharge and visual disturbance.   Respiratory:  Negative for cough, chest tightness, shortness of breath and wheezing.    Cardiovascular:  Negative for chest pain, palpitations and leg swelling.   Gastrointestinal:  Negative for abdominal distention, abdominal pain, blood in stool, constipation, diarrhea, nausea and vomiting.   Endocrine: Negative for cold intolerance, heat intolerance and polyuria.   Genitourinary:  Negative for decreased urine volume, difficulty urinating, dysuria, flank pain, frequency and urgency.   Musculoskeletal:  Negative for arthralgias, gait problem, myalgias, neck pain and neck stiffness.   Skin:  Negative for rash and wound.   Neurological:  Positive for weakness. Negative for dizziness, speech difficulty, light-headedness and headaches.   Hematological:  Negative for adenopathy. Does not bruise/bleed easily.   Psychiatric/Behavioral:  Negative for agitation, confusion, self-injury, sleep disturbance and suicidal ideas. The patient is not nervous/anxious.      Objective:     Vital Signs  (Most Recent):  Temp: 97.8 °F (36.6 °C) (06/30/24 1150)  Pulse: 75 (06/30/24 1150)  Resp: 18 (06/30/24 1150)  BP: (!) 111/56 (06/30/24 1150)  SpO2: (!) 91 % (06/30/24 1150) Vital Signs (24h Range):  Temp:  [97.8 °F (36.6 °C)-98.4 °F (36.9 °C)] 97.8 °F (36.6 °C)  Pulse:  [72-79] 75  Resp:  [17-18] 18  SpO2:  [91 %-100 %] 91 %  BP: (110-150)/(56-78) 111/56     Weight: 80.6 kg (177 lb 11.1 oz)  Body mass index is 54.22 kg/m².    Intake/Output Summary (Last 24 hours) at 6/30/2024 1436  Last data filed at 6/30/2024 1200  Gross per 24 hour   Intake 280 ml   Output --   Net 280 ml         Physical Exam  Vitals and nursing note reviewed.   Constitutional:       Appearance: She is well-developed.   Eyes:      Pupils: Pupils are equal, round, and reactive to light.   Cardiovascular:      Rate and Rhythm: Normal rate and regular rhythm.   Pulmonary:      Effort: Pulmonary effort is normal.      Breath sounds: Normal breath sounds.   Abdominal:      Palpations: Abdomen is soft.      Tenderness: There is no abdominal tenderness.   Musculoskeletal:         General: No tenderness.      Comments: B/L LE amputations noted   Skin:     General: Skin is warm and dry.   Neurological:      Mental Status: She is alert.      Comments: Oriented to person, place and year   Psychiatric:         Behavior: Behavior normal.             Significant Labs: All pertinent labs within the past 24 hours have been reviewed.    Significant Imaging: I have reviewed all pertinent imaging results/findings within the past 24 hours.    Assessment/Plan:      * Acute encephalopathy  Resolved  Patient has acute metabolic encephalopathy that likely secondary to UTI.Treatment for underlying cause is underway. Pt with recurrent admissions for encephalopathy with extensive work ups including EEG, CTH, MRI brain, and vitamin panels. Will monitor neuro status carefully, avoid narcotics and benzos that will exacerbate agitation, and use PRN anti-psychotics for controls  of behavior for self harm.  -UA concerning for infection- growing candida krusei  -CT abd/pelvis - no acute intraabdominal abnormality  -CXR with diffuse increased interstitial and parenchymal attenuation similar to prior study. No large region of confluent airspace consolidation or substantial volume of pleural fluid identified.   -CTH-negative  - ID consulted  1. Monitor clinically  2. Rec laxatives for constipation  3. Abx and antifungals not recommended  4. Seen with ID staff   -Delirium precautions   - reports progressive decline in patient over the last few months. Initiated NH paperwork for patient. CM notified for assistance, patient is medically ready    Severe obesity (BMI >= 40)  Body mass index is 54.09 kg/m². Morbid obesity complicates all aspects of disease management from diagnostic modalities to treatment.     ESRD (end stage renal disease)  Chronic kidney disease-mineral and bone disorder   MWF schedule, last dialyzed 6/26  Residual renal function?- Yes  - Nephrology consulted  - Continue chronic hemodialysis  - Monitor daily electrolytes and defer dialysis orders to nephrology  - Renally dose medications  - Renal diet  - Continue phosphorus binders  - Daily weights/strict I&Os  - 1.5L FR    Debility  Patient with Chronic debility due to other reduced mobility. Latest AMPAC and GEMS scores have not been reviewed. Plan includes progressive mobility protocol initated and fall precautions in place.  - family initiated NH paperwork  - CM notified    S/P AKA (above knee amputation) bilateral  History noted.  -Fall precautions.    Chronic combined systolic and diastolic heart failure  Patient is identified as having Combined Systolic and Diastolic heart failure that is Chronic. CHF is currently controlled. Latest ECHO performed and demonstrates- Results for orders placed during the hospital encounter of 05/07/24    Echo    Interpretation Summary    Left Ventricle: The left ventricle is mildly  "dilated. Ventricular mass is normal. Normal wall thickness. Severe global hypokinesis present. There is severely reduced systolic function. Ejection fraction by visual approximation is 15%. There is diastolic dysfunction.    Right Ventricle: Severe right ventricular enlargement. Wall thickness is normal. Right ventricle wall motion has global hypokinesis. Systolic function is mildly reduced.    Left Atrium: Left atrium is severely dilated. There is an atrial septal aneurysm.    Right Atrium: Right atrium is dilated.    Aortic Valve: There is mild aortic regurgitation.    Mitral Valve: There is mild regurgitation.    Tricuspid Valve: There is annular dilation present. There is normal leaflet mobility. There is severe functional regurgitation.    Pulmonary Artery: The estimated pulmonary artery systolic pressure is at least 24 mmHg. PASP is likely under-estimated in the setting of severe tricuspid regurgitation.    IVC/SVC: Central venous pressure of at least 8 mmHg.    No vegetation seen.  . Continue Beta Blocker, lasix and monitor clinical status closely. Monitor on telemetry. Patient is off CHF pathway.  Monitor strict Is&Os and daily weights.  Place on fluid restriction of 1.5 L. Continue to stress to patient importance of self efficacy and  on diet for CHF. Last BNP reviewed- and noted below No results for input(s): "BNP", "BNPTRIAGEBLO" in the last 168 hours..  -Volume management with HD.      Hyponatremia  Patient has hyponatremia which is uncontrolled,We will aim to correct the sodium by 4-6mEq in 24 hours. We will monitor sodium Daily. The hyponatremia is due to Medications: SSRIs and renal insufficiency. We will obtain the following studies: Urine sodium, urine osmolality, serum osmolality. We will treat the hyponatremia with Hemodialysis and Removal of offending medications. The patient's sodium results have been reviewed and are listed below.  No results for input(s): "NA" in the last 24 hours.  - " patient with chronic hyponatremia most likely 2/2 ESRD    Paroxysmal atrial fibrillation  Patient with Paroxysmal (<7 days) atrial fibrillation which is controlled currently with Beta Blocker. Patient is currently in sinus rhythm.[id number] Score: 3. Anticoagulation indicated. Anticoagulation done with eliquis .    Anemia due to chronic kidney disease, on chronic dialysis  Patient's anemia is currently controlled. Has not received any PRBCs to date. Etiology likely d/t chronic disease due to ESRD  Current CBC reviewed-   Lab Results   Component Value Date    HGB 9.7 (L) 06/26/2024    HCT 30.4 (L) 06/26/2024     Monitor serial CBC and transfuse if patient becomes hemodynamically unstable, symptomatic or H/H drops below 7/21.    CAD (coronary artery disease)  Patient with known CAD s/p CABG, which is controlled Will continue Plavix and Statin and monitor for S/Sx of angina/ACS. Continue to monitor on telemetry.     Type 2 diabetes mellitus with hyperglycemia, with long-term current use of insulin  Patient's FSGs are not controlled on current hypoglycemics due to hypoglycemia.   Last A1c reviewed-   Lab Results   Component Value Date    HGBA1C 8.0 (H) 04/24/2024     Most recent fingerstick glucose reviewed-   Recent Labs   Lab 06/26/24  1629 06/26/24  2154 06/27/24  0800 06/27/24  1119   POCTGLUCOSE 164* 252* 170* 147*       Current correctional scale  Low  Maintain anti-hyperglycemic dose as follows-   Antihyperglycemics (From admission, onward)      Start     Stop Route Frequency Ordered    06/24/24 1645  insulin aspart U-100 pen 3 Units         -- SubQ 3 times daily with meals 06/24/24 1335    06/22/24 0032  insulin aspart U-100 pen 0-5 Units         -- SubQ Before meals & nightly PRN 06/21/24 2332        -Pt initially hypoglycemic which improved with D10 bolus.   -Accuchecks AC/HS    Acute cystitis without hematuria  -Pt denies any abd pain, dysuria, urinary frequency, or fever/chills. Per  patient's urine  has been foul smelling and cloudy. Per  patient has also been complaining of suprapubic pain and dysuria at home.  -Afebrile, WBC 3.80 (chronic leukopenia).  -UA reviewed, follow urine cx.  -Pt received IV rocephin in the ED, continue for now.  -culture with candida krusei  - ID consulted for recommendations - no tx indicated        CAROLIN (generalized anxiety disorder)  -Chronic, uncontrolled.  -Hold zoloft for now pending hyponatremia work up.   -Can consider psychiatry consult - consult placed:   - recommend continuing zoloft, trazadone 25mg qhs    Peripheral vascular disease  -Chronic issue.  -Continue plavix and statin.      VTE Risk Mitigation (From admission, onward)           Ordered     heparin (porcine) injection 1,000 Units  As needed (PRN)         06/24/24 1001     apixaban tablet 5 mg  2 times daily         06/21/24 2332     IP VTE HIGH RISK PATIENT  Once         06/21/24 2332     Reason for No Pharmacological VTE Prophylaxis  Once        Question:  Reasons:  Answer:  Already adequately anticoagulated on oral Anticoagulants    06/21/24 2332                    Discharge Planning   DEVAN: 7/1/2024     Code Status: Full Code   Is the patient medically ready for discharge?:     Reason for patient still in hospital (select all that apply): Pending disposition  Discharge Plan A: New Nursing Home placement - shelter care facility                  Rodríguez Partt MD  Department of Hospital Medicine   [facility]

## 2024-06-30 NOTE — PLAN OF CARE
Patient AAOX4. No distress noted. Call light within. Bed at lowest position.   Problem: Infection  Goal: Absence of Infection Signs and Symptoms  Outcome: Progressing     Problem: Adult Inpatient Plan of Care  Goal: Plan of Care Review  Outcome: Progressing  Goal: Patient-Specific Goal (Individualized)  Outcome: Progressing  Goal: Absence of Hospital-Acquired Illness or Injury  Outcome: Progressing  Goal: Optimal Comfort and Wellbeing  Outcome: Progressing  Goal: Readiness for Transition of Care  Outcome: Progressing     Problem: Bariatric Environmental Safety  Goal: Safety Maintained with Care  Outcome: Progressing     Problem: Skin Injury Risk Increased  Goal: Skin Health and Integrity  Outcome: Progressing     Problem: Diabetes Comorbidity  Goal: Blood Glucose Level Within Targeted Range  Outcome: Progressing     Problem: Sepsis/Septic Shock  Goal: Optimal Coping  Outcome: Progressing  Goal: Absence of Bleeding  Outcome: Progressing  Goal: Blood Glucose Level Within Targeted Range  Outcome: Progressing  Goal: Absence of Infection Signs and Symptoms  Outcome: Progressing  Goal: Optimal Nutrition Intake  Outcome: Progressing     Problem: Wound  Goal: Optimal Coping  Outcome: Progressing  Goal: Optimal Functional Ability  Outcome: Progressing  Goal: Absence of Infection Signs and Symptoms  Outcome: Progressing  Goal: Improved Oral Intake  Outcome: Progressing  Goal: Optimal Pain Control and Function  Outcome: Progressing  Goal: Skin Health and Integrity  Outcome: Progressing  Goal: Optimal Wound Healing  Outcome: Progressing     Problem: Fall Injury Risk  Goal: Absence of Fall and Fall-Related Injury  Outcome: Progressing     Problem: Pain Acute  Goal: Optimal Pain Control and Function  Outcome: Progressing     Problem: Hemodialysis  Goal: Safe, Effective Therapy Delivery  Outcome: Progressing  Goal: Effective Tissue Perfusion  Outcome: Progressing  Goal: Absence of Infection Signs and Symptoms  Outcome:  Progressing     Problem: Chronic Kidney Disease  Goal: Electrolyte Balance  Outcome: Progressing  Goal: Fluid Balance  Outcome: Progressing

## 2024-07-01 LAB
25(OH)D3+25(OH)D2 SERPL-MCNC: 21 NG/ML (ref 30–96)
ALBUMIN SERPL BCP-MCNC: 2.8 G/DL (ref 3.5–5.2)
ANION GAP SERPL CALC-SCNC: 8 MMOL/L (ref 8–16)
BUN SERPL-MCNC: 51 MG/DL (ref 6–20)
CALCIUM SERPL-MCNC: 8.6 MG/DL (ref 8.7–10.5)
CHLORIDE SERPL-SCNC: 98 MMOL/L (ref 95–110)
CO2 SERPL-SCNC: 22 MMOL/L (ref 23–29)
CREAT SERPL-MCNC: 4.5 MG/DL (ref 0.5–1.4)
EST. GFR  (NO RACE VARIABLE): 10.8 ML/MIN/1.73 M^2
GLUCOSE SERPL-MCNC: 139 MG/DL (ref 70–110)
PHOSPHATE SERPL-MCNC: 3.9 MG/DL (ref 2.7–4.5)
POCT GLUCOSE: 108 MG/DL (ref 70–110)
POCT GLUCOSE: 138 MG/DL (ref 70–110)
POCT GLUCOSE: 140 MG/DL (ref 70–110)
POCT GLUCOSE: 157 MG/DL (ref 70–110)
POCT GLUCOSE: 183 MG/DL (ref 70–110)
POTASSIUM SERPL-SCNC: 4.8 MMOL/L (ref 3.5–5.1)
SODIUM SERPL-SCNC: 128 MMOL/L (ref 136–145)

## 2024-07-01 PROCEDURE — 82306 VITAMIN D 25 HYDROXY: CPT | Mod: HCNC

## 2024-07-01 PROCEDURE — 25000003 PHARM REV CODE 250: Mod: HCNC

## 2024-07-01 PROCEDURE — 80069 RENAL FUNCTION PANEL: CPT | Mod: HCNC

## 2024-07-01 PROCEDURE — 80100016 HC MAINTENANCE HEMODIALYSIS: Mod: HCNC

## 2024-07-01 PROCEDURE — 36415 COLL VENOUS BLD VENIPUNCTURE: CPT | Mod: HCNC

## 2024-07-01 PROCEDURE — 11000001 HC ACUTE MED/SURG PRIVATE ROOM: Mod: HCNC

## 2024-07-01 PROCEDURE — 25000003 PHARM REV CODE 250: Mod: HCNC | Performed by: NURSE PRACTITIONER

## 2024-07-01 PROCEDURE — 63600175 PHARM REV CODE 636 W HCPCS: Mod: HCNC

## 2024-07-01 RX ADMIN — CARVEDILOL 6.25 MG: 6.25 TABLET, FILM COATED ORAL at 09:07

## 2024-07-01 RX ADMIN — INSULIN ASPART 3 UNITS: 100 INJECTION, SOLUTION INTRAVENOUS; SUBCUTANEOUS at 09:07

## 2024-07-01 RX ADMIN — FUROSEMIDE 40 MG: 40 TABLET ORAL at 09:07

## 2024-07-01 RX ADMIN — CARVEDILOL 6.25 MG: 6.25 TABLET, FILM COATED ORAL at 10:07

## 2024-07-01 RX ADMIN — CLOPIDOGREL BISULFATE 75 MG: 75 TABLET ORAL at 09:07

## 2024-07-01 RX ADMIN — HYDROXYZINE HYDROCHLORIDE 25 MG: 25 TABLET, FILM COATED ORAL at 04:07

## 2024-07-01 RX ADMIN — INSULIN ASPART 3 UNITS: 100 INJECTION, SOLUTION INTRAVENOUS; SUBCUTANEOUS at 06:07

## 2024-07-01 RX ADMIN — APIXABAN 5 MG: 5 TABLET, FILM COATED ORAL at 09:07

## 2024-07-01 RX ADMIN — CALCIUM ACETATE 667 MG: 667 CAPSULE ORAL at 11:07

## 2024-07-01 RX ADMIN — HEPARIN SODIUM 1000 UNITS: 1000 INJECTION, SOLUTION INTRAVENOUS; SUBCUTANEOUS at 05:07

## 2024-07-01 RX ADMIN — GABAPENTIN 100 MG: 100 CAPSULE ORAL at 09:07

## 2024-07-01 RX ADMIN — INSULIN ASPART 3 UNITS: 100 INJECTION, SOLUTION INTRAVENOUS; SUBCUTANEOUS at 11:07

## 2024-07-01 RX ADMIN — ACETAMINOPHEN 650 MG: 325 TABLET ORAL at 10:07

## 2024-07-01 RX ADMIN — CALCIUM ACETATE 667 MG: 667 CAPSULE ORAL at 07:07

## 2024-07-01 RX ADMIN — ERYTHROPOIETIN 4000 UNITS: 4000 INJECTION, SOLUTION INTRAVENOUS; SUBCUTANEOUS at 03:07

## 2024-07-01 RX ADMIN — ACETAMINOPHEN 1000 MG: 500 TABLET ORAL at 02:07

## 2024-07-01 RX ADMIN — SERTRALINE 100 MG: 100 TABLET, FILM COATED ORAL at 10:07

## 2024-07-01 RX ADMIN — CALCIUM ACETATE 667 MG: 667 CAPSULE ORAL at 06:07

## 2024-07-01 RX ADMIN — FAMOTIDINE 20 MG: 20 TABLET ORAL at 09:07

## 2024-07-01 RX ADMIN — CALCITRIOL 0.5 MCG: 0.5 CAPSULE, LIQUID FILLED ORAL at 09:07

## 2024-07-01 RX ADMIN — HYDRALAZINE HYDROCHLORIDE 50 MG: 50 TABLET ORAL at 09:07

## 2024-07-01 RX ADMIN — TRAZODONE HYDROCHLORIDE 25 MG: 50 TABLET ORAL at 09:07

## 2024-07-01 RX ADMIN — ATORVASTATIN CALCIUM 80 MG: 40 TABLET, FILM COATED ORAL at 09:07

## 2024-07-01 NOTE — PROGRESS NOTES
[facility] [address] [place] [address] Medicine  Progress Note    Patient Name: Suyapa Connelly  MRN: [id number]  Patient Class: IP- Inpatient   Admission Date: 6/21/2024  Length of Stay: 9 days  Attending Physician: Rodríguez Pratt MD  Primary Care Provider: Feliciano Nguyen III, PA-C        Subjective:     Principal Problem:Acute encephalopathy        HPI:  Suyapa Connelly is a 58 y.o. female with a PMHx of HTN, GERD, HLD, PVD, ESRD on HD MFW, b/l AKAs, CAD s/p CABG, afib on eliquis, depression, and anxiety who presents to the ED for evaluation of confusion. HPI and ROS limited secondary to patient's mental status and poor participation. Per patient's  she has been more sleepy and less interactive over the past several days. He reports that her urine has been foul-smelling and more cloudy. He states she was also peeing more frequently in complaining of burning with urination and some occasional suprapubic pain. Denies any fevers or chills. The patient denies CP, SOB, abdominal pain, or dysuria. Per  patient was dialyzed today a full dialysis and they came straight here after.     In the ED, VSS, afebrile. CBC with stable leukopenia and anemia. Na 129, Chloride 94, Bicarb 22, Glucose 50. POC glucose 150. Cr 2.7 (consistent with ESRD). Albumin 3.2. Tbili 1.1. Mag 2.1. UA with 3+ leukocytosis, 74 WBCs, but no bacteria. CTH with no acute finding or detrimental change when compared with 06/11/2024, allowing for motion and artifact limitations. The patient received 1g IV Rocephin.    Overview/Hospital Course:  Suyapa Connelly is a 58 y.o. F who was admitted to  for further evaluation of acute encephalopathy. Most likely 2/2 UTI, other workup negative. AAOx3 now. Initiated rocephin for infectious appearing UA. Following culture - grew Candida krusei, stopped rocephin. ID consulted for recommendations. Recommend deferring treatment, obtain CT abd/pelvis. CT with findings of volume overload, otherwise no  acute intraabdominal abnormality. Given x1 120mg IV lasix. Repeating UA with straight cath and following cx per ID recs. Nephrology following for HD management: HD performed 6/24 and 6/26. Psych consulted for recommendations: continue zoloft, add trazodone qhs. CM assisting with discharge planning.       Interval History:     NAEON.  Na mildly low, 128.  HD today.  [facility]  declined admission.      Review of Systems   Constitutional:  Positive for fatigue. Negative for activity change, appetite change, chills, diaphoresis and fever.   HENT:  Negative for congestion, facial swelling, hearing loss, nosebleeds, sinus pressure, sinus pain, sneezing, sore throat, tinnitus, trouble swallowing and voice change.    Eyes:  Negative for photophobia, pain, discharge and visual disturbance.   Respiratory:  Negative for cough, chest tightness, shortness of breath and wheezing.    Cardiovascular:  Negative for chest pain, palpitations and leg swelling.   Gastrointestinal:  Negative for abdominal distention, abdominal pain, blood in stool, constipation, diarrhea, nausea and vomiting.   Endocrine: Negative for cold intolerance, heat intolerance and polyuria.   Genitourinary:  Negative for decreased urine volume, difficulty urinating, dysuria, flank pain, frequency and urgency.   Musculoskeletal:  Negative for arthralgias, gait problem, myalgias, neck pain and neck stiffness.   Skin:  Negative for rash and wound.   Neurological:  Positive for weakness. Negative for dizziness, speech difficulty, light-headedness and headaches.   Hematological:  Negative for adenopathy. Does not bruise/bleed easily.   Psychiatric/Behavioral:  Negative for agitation, confusion, self-injury, sleep disturbance and suicidal ideas. The patient is not nervous/anxious.      Objective:     Vital Signs (Most Recent):  Temp: 97.8 °F (36.6 °C) (07/01/24 1410)  Pulse: 71 (07/01/24 1545)  Resp: 18 (07/01/24 1410)  BP: 122/64 (07/01/24 1545)  SpO2: 97 % (07/01/24  1410) Vital Signs (24h Range):  Temp:  [97.2 °F (36.2 °C)-98.2 °F (36.8 °C)] 97.8 °F (36.6 °C)  Pulse:  [70-79] 71  Resp:  [16-18] 18  SpO2:  [92 %-99 %] 97 %  BP: (111-139)/(53-72) 122/64     Weight: 80.5 kg (177 lb 7.5 oz)  Body mass index is 54.16 kg/m².    Intake/Output Summary (Last 24 hours) at 7/1/2024 1602  Last data filed at 6/30/2024 1655  Gross per 24 hour   Intake 380 ml   Output --   Net 380 ml         Physical Exam  Vitals and nursing note reviewed.   Constitutional:       Appearance: She is well-developed.   Eyes:      Pupils: Pupils are equal, round, and reactive to light.   Cardiovascular:      Rate and Rhythm: Normal rate and regular rhythm.   Pulmonary:      Effort: Pulmonary effort is normal.      Breath sounds: Normal breath sounds.   Abdominal:      Palpations: Abdomen is soft.      Tenderness: There is no abdominal tenderness.   Musculoskeletal:         General: No tenderness.      Comments: B/L LE amputations noted   Skin:     General: Skin is warm and dry.   Neurological:      Mental Status: She is alert.      Comments: Oriented to person, place and year   Psychiatric:         Behavior: Behavior normal.             Significant Labs: All pertinent labs within the past 24 hours have been reviewed.    Significant Imaging: I have reviewed all pertinent imaging results/findings within the past 24 hours.    Assessment/Plan:      * Acute encephalopathy  Resolved  Patient has acute metabolic encephalopathy that likely secondary to UTI.Treatment for underlying cause is underway. Pt with recurrent admissions for encephalopathy with extensive work ups including EEG, CTH, MRI brain, and vitamin panels. Will monitor neuro status carefully, avoid narcotics and benzos that will exacerbate agitation, and use PRN anti-psychotics for controls of behavior for self harm.  -UA concerning for infection- growing candida krusei  -CT abd/pelvis - no acute intraabdominal abnormality  -CXR with diffuse increased  interstitial and parenchymal attenuation similar to prior study. No large region of confluent airspace consolidation or substantial volume of pleural fluid identified.   -CTH-negative  - ID consulted  1. Monitor clinically  2. Rec laxatives for constipation  3. Abx and antifungals not recommended  4. Seen with ID staff   -Delirium precautions   - reports progressive decline in patient over the last few months. Initiated NH paperwork for patient. CM notified for assistance, patient is medically ready    Severe obesity (BMI >= 40)  Body mass index is 54.09 kg/m². Morbid obesity complicates all aspects of disease management from diagnostic modalities to treatment.     ESRD (end stage renal disease)  Chronic kidney disease-mineral and bone disorder   MWF schedule, last dialyzed 6/26  Residual renal function?- Yes  - Nephrology consulted  - Continue chronic hemodialysis  - Monitor daily electrolytes and defer dialysis orders to nephrology  - Renally dose medications  - Renal diet  - Continue phosphorus binders  - Daily weights/strict I&Os  - 1.5L FR    Debility  Patient with Chronic debility due to other reduced mobility. Latest AMPAC and GEMS scores have not been reviewed. Plan includes progressive mobility protocol initated and fall precautions in place.  - family initiated NH paperwork  - CM notified    S/P AKA (above knee amputation) bilateral  History noted.  -Fall precautions.    Chronic combined systolic and diastolic heart failure  Patient is identified as having Combined Systolic and Diastolic heart failure that is Chronic. CHF is currently controlled. Latest ECHO performed and demonstrates- Results for orders placed during the hospital encounter of 05/07/24    Echo    Interpretation Summary    Left Ventricle: The left ventricle is mildly dilated. Ventricular mass is normal. Normal wall thickness. Severe global hypokinesis present. There is severely reduced systolic function. Ejection fraction by visual  "approximation is 15%. There is diastolic dysfunction.    Right Ventricle: Severe right ventricular enlargement. Wall thickness is normal. Right ventricle wall motion has global hypokinesis. Systolic function is mildly reduced.    Left Atrium: Left atrium is severely dilated. There is an atrial septal aneurysm.    Right Atrium: Right atrium is dilated.    Aortic Valve: There is mild aortic regurgitation.    Mitral Valve: There is mild regurgitation.    Tricuspid Valve: There is annular dilation present. There is normal leaflet mobility. There is severe functional regurgitation.    Pulmonary Artery: The estimated pulmonary artery systolic pressure is at least 24 mmHg. PASP is likely under-estimated in the setting of severe tricuspid regurgitation.    IVC/SVC: Central venous pressure of at least 8 mmHg.    No vegetation seen.  . Continue Beta Blocker, lasix and monitor clinical status closely. Monitor on telemetry. Patient is off CHF pathway.  Monitor strict Is&Os and daily weights.  Place on fluid restriction of 1.5 L. Continue to stress to patient importance of self efficacy and  on diet for CHF. Last BNP reviewed- and noted below No results for input(s): "BNP", "BNPTRIAGEBLO" in the last 168 hours..  -Volume management with HD.      Hyponatremia  Patient has hyponatremia which is uncontrolled,We will aim to correct the sodium by 4-6mEq in 24 hours. We will monitor sodium Daily. The hyponatremia is due to Medications: SSRIs and renal insufficiency. We will obtain the following studies: Urine sodium, urine osmolality, serum osmolality. We will treat the hyponatremia with Hemodialysis and Removal of offending medications. The patient's sodium results have been reviewed and are listed below.  No results for input(s): "NA" in the last 24 hours.  - patient with chronic hyponatremia most likely 2/2 ESRD    Paroxysmal atrial fibrillation  Patient with Paroxysmal (<7 days) atrial fibrillation which is controlled " currently with Beta Blocker. Patient is currently in sinus rhythm.[id number] Score: 3. Anticoagulation indicated. Anticoagulation done with eliquis .    Anemia due to chronic kidney disease, on chronic dialysis  Patient's anemia is currently controlled. Has not received any PRBCs to date. Etiology likely d/t chronic disease due to ESRD  Current CBC reviewed-   Lab Results   Component Value Date    HGB 9.7 (L) 06/26/2024    HCT 30.4 (L) 06/26/2024     Monitor serial CBC and transfuse if patient becomes hemodynamically unstable, symptomatic or H/H drops below 7/21.    CAD (coronary artery disease)  Patient with known CAD s/p CABG, which is controlled Will continue Plavix and Statin and monitor for S/Sx of angina/ACS. Continue to monitor on telemetry.     Type 2 diabetes mellitus with hyperglycemia, with long-term current use of insulin  Patient's FSGs are not controlled on current hypoglycemics due to hypoglycemia.   Last A1c reviewed-   Lab Results   Component Value Date    HGBA1C 8.0 (H) 04/24/2024     Most recent fingerstick glucose reviewed-   Recent Labs   Lab 06/26/24  1629 06/26/24  2154 06/27/24  0800 06/27/24  1119   POCTGLUCOSE 164* 252* 170* 147*       Current correctional scale  Low  Maintain anti-hyperglycemic dose as follows-   Antihyperglycemics (From admission, onward)      Start     Stop Route Frequency Ordered    06/24/24 1645  insulin aspart U-100 pen 3 Units         -- SubQ 3 times daily with meals 06/24/24 1335    06/22/24 0032  insulin aspart U-100 pen 0-5 Units         -- SubQ Before meals & nightly PRN 06/21/24 2332        -Pt initially hypoglycemic which improved with D10 bolus.   -Accuchecks AC/HS    Acute cystitis without hematuria  -Pt denies any abd pain, dysuria, urinary frequency, or fever/chills. Per  patient's urine has been foul smelling and cloudy. Per  patient has also been complaining of suprapubic pain and dysuria at home.  -Afebrile, WBC 3.80 (chronic  leukopenia).  -UA reviewed, follow urine cx.  -Pt received IV rocephin in the ED, continue for now.  -culture with candida krusei  - ID consulted for recommendations - no tx indicated        CAROLIN (generalized anxiety disorder)  -Chronic, uncontrolled.  -Hold zoloft for now pending hyponatremia work up.   -Can consider psychiatry consult - consult placed:   - recommend continuing zoloft, trazadone 25mg qhs    Peripheral vascular disease  -Chronic issue.  -Continue plavix and statin.      VTE Risk Mitigation (From admission, onward)           Ordered     heparin (porcine) injection 1,000 Units  As needed (PRN)         06/24/24 1001     apixaban tablet 5 mg  2 times daily         06/21/24 2332     IP VTE HIGH RISK PATIENT  Once         06/21/24 2332     Reason for No Pharmacological VTE Prophylaxis  Once        Question:  Reasons:  Answer:  Already adequately anticoagulated on oral Anticoagulants    06/21/24 2332                    Discharge Planning   DEVAN: 7/2/2024     Code Status: Full Code   Is the patient medically ready for discharge?:     Reason for patient still in hospital (select all that apply): Pending disposition  Discharge Plan A: New Nursing Home placement - [facility] care facility                  Rodríguez Pratt MD  Department of Hospital Medicine   [facility]

## 2024-07-01 NOTE — SUBJECTIVE & OBJECTIVE
Interval History:     NAEON.  Na mildly low, 128.  HD today.  [facility] declined admission.      Review of Systems   Constitutional:  Positive for fatigue. Negative for activity change, appetite change, chills, diaphoresis and fever.   HENT:  Negative for congestion, facial swelling, hearing loss, nosebleeds, sinus pressure, sinus pain, sneezing, sore throat, tinnitus, trouble swallowing and voice change.    Eyes:  Negative for photophobia, pain, discharge and visual disturbance.   Respiratory:  Negative for cough, chest tightness, shortness of breath and wheezing.    Cardiovascular:  Negative for chest pain, palpitations and leg swelling.   Gastrointestinal:  Negative for abdominal distention, abdominal pain, blood in stool, constipation, diarrhea, nausea and vomiting.   Endocrine: Negative for cold intolerance, heat intolerance and polyuria.   Genitourinary:  Negative for decreased urine volume, difficulty urinating, dysuria, flank pain, frequency and urgency.   Musculoskeletal:  Negative for arthralgias, gait problem, myalgias, neck pain and neck stiffness.   Skin:  Negative for rash and wound.   Neurological:  Positive for weakness. Negative for dizziness, speech difficulty, light-headedness and headaches.   Hematological:  Negative for adenopathy. Does not bruise/bleed easily.   Psychiatric/Behavioral:  Negative for agitation, confusion, self-injury, sleep disturbance and suicidal ideas. The patient is not nervous/anxious.      Objective:     Vital Signs (Most Recent):  Temp: 97.8 °F (36.6 °C) (07/01/24 1410)  Pulse: 71 (07/01/24 1545)  Resp: 18 (07/01/24 1410)  BP: 122/64 (07/01/24 1545)  SpO2: 97 % (07/01/24 1410) Vital Signs (24h Range):  Temp:  [97.2 °F (36.2 °C)-98.2 °F (36.8 °C)] 97.8 °F (36.6 °C)  Pulse:  [70-79] 71  Resp:  [16-18] 18  SpO2:  [92 %-99 %] 97 %  BP: (111-139)/(53-72) 122/64     Weight: 80.5 kg (177 lb 7.5 oz)  Body mass index is 54.16 kg/m².    Intake/Output Summary (Last 24 hours) at  7/1/2024 1602  Last data filed at 6/30/2024 1655  Gross per 24 hour   Intake 380 ml   Output --   Net 380 ml         Physical Exam  Vitals and nursing note reviewed.   Constitutional:       Appearance: She is well-developed.   Eyes:      Pupils: Pupils are equal, round, and reactive to light.   Cardiovascular:      Rate and Rhythm: Normal rate and regular rhythm.   Pulmonary:      Effort: Pulmonary effort is normal.      Breath sounds: Normal breath sounds.   Abdominal:      Palpations: Abdomen is soft.      Tenderness: There is no abdominal tenderness.   Musculoskeletal:         General: No tenderness.      Comments: B/L LE amputations noted   Skin:     General: Skin is warm and dry.   Neurological:      Mental Status: She is alert.      Comments: Oriented to person, place and year   Psychiatric:         Behavior: Behavior normal.             Significant Labs: All pertinent labs within the past 24 hours have been reviewed.    Significant Imaging: I have reviewed all pertinent imaging results/findings within the past 24 hours.

## 2024-07-01 NOTE — ASSESSMENT & PLAN NOTE
58 y.o. Black or  Female ESRD-HD M-W-F presents to ED on 6/21/2024 with diagnosis of: Hypoglycemia [E16.2];Encephalopathy [G93.40];Chest pain [R07.9];Urinary tract infection with hematuria, site unspecified [N39.0, R31.9]   Nephrology consulted for inpatient ESRD-HD management    Outpatient HD Information:  -Dialysis modality: Hemodialysis  -[place] [facility] [place]  -Nephrologist: ?  -HD TX days: Monday/Wednesday/Friday, duration of treatment: 3 hrs   -Last HD TX prior to hospital admission: 6/21/24  -Dialysis access: dialysis catheter   -Residual urine: Minium  -EDW: 67 kg     Assessment:   - Dialysis for metabolic clearance and volume management will be provided today.    Anemia of ESRD   Recent Labs   Lab 06/25/24  0904 06/26/24  0837 06/30/24  0657   WBC 3.79* 5.11 4.24   HGB 9.6* 9.7* 10.0*   HCT 29.2* 30.4* 32.3*    173 158       Lab Results   Component Value Date    FESATURATED 35 06/22/2024    FERRITIN 757 (H) 06/22/2024       - Goal in ESRD is Hgb of 10-11. Near target.   - continue EPO 50 units /kg    Mineral Bone Disease in ESRD   Lab Results   Component Value Date    PTH 1,132 (H) 06/17/2024    CALCIUM 9.4 06/26/2024    ALBUMIN 3.0 (L) 06/26/2024    CAION 0.92 (L) 05/17/2023    PHOS 3.7 06/26/2024

## 2024-07-01 NOTE — PROGRESS NOTES
07/01/24 1723 07/01/24 1725        Hemodialysis Catheter left internal jugular   No placement date or time found.   Present Prior to Hospital Arrival?: No  Hand Hygiene: Performed  Barrier Precautions: Performed  Skin Antisepsis: ChloraPrep  Hemodialysis Catheter Type: Tunneled catheter  Location: left internal jugular  Catheter S...   Site Assessment  --  No drainage   Line Securement Device  --  Secured with sutureless device   Dressing Type  --  CHG impregnated dressing/sponge   Dressing Status  --  Clean;Dry;Intact   Dressing Intervention  --  Integrity maintained   Date on Dressing  --  06/30/24   Dressing Due to be Changed  --  07/07/24   Venous Patency/Care  --  deaccessed;flushed w/o difficulty;heparin locked   Arterial Patency/Care  --  deaccessed;flushed w/o difficulty;heparin locked   During Hemodialysis Assessment   Blood Flow Rate (mL/min) 400 mL/min  --    Dialysate Flow Rate (mL/min) 700 ml/min  --    Ultrafiltration Rate (mL/Hr) 380 mL/Hr  --    Arteriovenous Lines Secure Yes  --    Arterial Pressure (mmHg) -140 mmHg  --    Venous Pressure (mmHg) 170  --    Blood Volume Processed (Liters) 68.3 L  --    UF Removed (mL) 1650 mL  --    TMP 30  --    Venous Line in Air Detector Yes  --    Intake (mL) 250 mL  --    Transducer Dry Yes  --    Access Visible Yes  --     notified of access issue? N/A  --    Heparin given? N/A  --    Intra-Hemodialysis Comments HD completed  --    Post-Hemodialysis Assessment   Rinseback Volume (mL) 250 mL  --    Blood Volume Processed (Liters) 68.3 L  --    Dialyzer Clearance Moderately streaked  --    Duration of Treatment 180 minutes  --    Total UF (mL) 1650 mL  --    Net Fluid Removal 1000  --    Patient Response to Treatment Hollie. well  --      Patient left [facility] by bed NAD.

## 2024-07-01 NOTE — PLAN OF CARE
[facility]      HOME HEALTH ORDERS  FACE TO FACE ENCOUNTER    Patient Name: Suyapa Connelly  YOB: 1965    PCP: Feliciano Nguyen III, PA-C   PCP Address: [facility] [address]  PCP Phone Number: [phone]  PCP Fax: [phone]    Encounter Date: 6/21/24    Admit to Home Health    Diagnoses:  Active Hospital Problems    Diagnosis  POA    *Acute encephalopathy [G93.40]  Yes    Severe obesity (BMI >= 40) [E66.01]  Yes     BMI 45      Chronic kidney disease-mineral and bone disorder [N18.9, E83.9, M89.9]  Yes    ESRD (end stage renal disease) [N18.6]  Yes    S/P AKA (above knee amputation) bilateral [Z89.611, Z89.612]  Not Applicable    Debility [R53.81]  Yes    Chronic combined systolic and diastolic heart failure [I50.42]  Yes    Hyponatremia [E87.1]  Yes    Paroxysmal atrial fibrillation [I48.0]  Yes    Anemia due to chronic kidney disease, on chronic dialysis [N18.6, D63.1, Z99.2]  Not Applicable    CAD (coronary artery disease) [I25.10]  Yes     Formatting of this note might be different from the original.  Last Assessment & Plan:   Formatting of this note might be different from the original.  -- Optimize glucose control.      Type 2 diabetes mellitus with hyperglycemia, with long-term current use of insulin [E11.65, Z79.4]  Not Applicable    Acute cystitis without hematuria [N30.00]  Yes    CAROLIN (generalized anxiety disorder) [F41.1]  Yes    Peripheral vascular disease [I73.9]  Yes      Resolved Hospital Problems   No resolved problems to display.       Follow Up Appointments:  No future appointments.    Allergies:  Review of patient's allergies indicates:   Allergen Reactions    Ciprofloxacin Itching    Iodine      Kidney injury    Pcn [penicillins]      Rash; tolerated ceftriaxone on 1/13/20       Medications: Review discharge medications with patient and family and provide education.    Current Discharge Medication List        START taking these medications     Details   epoetin nina (PROCRIT) 4,000 unit/mL injection Inject 1 mL (4,000 Units total) into the skin every Mon, Wed, Fri.    Associated Diagnoses: Anemia due to chronic kidney disease, on chronic dialysis           CONTINUE these medications which have NOT CHANGED    Details   furosemide (LASIX) 40 MG tablet Take 40 mg by mouth once daily.      gabapentin (NEURONTIN) 100 MG capsule Take 100 mg by mouth 2 (two) times daily.      acetaminophen (TYLENOL) 500 MG tablet Take 500 mg by mouth every 6 (six) hours as needed for Pain.      allopurinoL (ZYLOPRIM) 100 MG tablet Take 0.5 tablets (50 mg total) by mouth every other day.  Qty: 8 tablet, Refills: 11      apixaban (ELIQUIS) 5 mg Tab Take 1 tablet (5 mg total) by mouth 2 (two) times daily.  Qty: 60 tablet, Refills: 11      atorvastatin (LIPITOR) 80 MG tablet Take 1 tablet (80 mg total) by mouth once daily.  Qty: 90 tablet, Refills: 3    Associated Diagnoses: Coronary artery disease, unspecified vessel or lesion type, unspecified whether angina present, unspecified whether native or transplanted heart      blood sugar diagnostic Strp Use to check blood glucose 2 (two) times a day.  Qty: 100 strip, Refills: 0      calcitRIOL (ROCALTROL) 0.5 MCG Cap Take 1 capsule (0.5 mcg total) by mouth once daily.  Qty: 90 capsule, Refills: 3      calcium acetate,phosphat bind, (PHOSLO) 667 mg capsule Take 1 capsule (667 mg total) by mouth 3 (three) times daily with meals.  Qty: 90 capsule, Refills: 11      carvediloL (COREG) 6.25 MG tablet Take 1 tablet (6.25 mg total) by mouth 2 (two) times daily.  Qty: 180 tablet, Refills: 3    Comments: .      clopidogreL (PLAVIX) 75 mg tablet Take 1 tablet (75 mg total) by mouth once daily.  Qty: 90 tablet, Refills: 3    Associated Diagnoses: S/P CABG x 1      famotidine (PEPCID) 20 MG tablet Take 1 tablet (20 mg total) by mouth 2 (two) times daily.  Qty: 180 tablet, Refills: 3      hydrALAZINE (APRESOLINE) 50 MG tablet Take 1 tablet (50 mg  "total) by mouth 3 (three) times daily.  Qty: 270 tablet, Refills: 3    Comments: .      insulin aspart, niacinamide, (FIASP FLEXTOUCH U-100 INSULIN) 100 unit/mL (3 mL) InPn Inject 3 Units into the skin 3 (three) times daily with meals.      lancets Misc To check BG 3 times daily, to use with insurance preferred meter  Qty: 100 each, Refills: 3      multivitamin (ONE DAILY MULTIVITAMIN) per tablet Take 1 tablet by mouth once daily.      pen needle, diabetic 32 gauge x 5/32" Ndle 1 Units by Misc.(Non-Drug; Combo Route) route before meals as needed (SSI).  Qty: 50 each, Refills: 3      sertraline (ZOLOFT) 100 MG tablet Take 1 tablet (100 mg total) by mouth once daily.  Qty: 90 tablet, Refills: 3    Associated Diagnoses: CAROLIN (generalized anxiety disorder)      sodium bicarbonate 650 MG tablet TAKE 1 TABLET(650 MG) BY MOUTH THREE TIMES DAILY  Qty: 90 tablet, Refills: 0      traZODone (DESYREL) 50 MG tablet Take 0.5 tablets (25 mg total) by mouth every evening.  Qty: 15 tablet, Refills: 11      wound dressings (TRIAD WOUND DRESSING) Pste Apply 2 g topically once daily.  Qty: 170 g, Refills: 0               I have seen and examined this patient within the last 30 days. My clinical findings that support the need for the home health skilled services and home bound status are the following:no   Weakness/numbness causing balance and gait disturbance due to Weakness/Debility making it taxing to leave home.     Diet:   cardiac diet, diabetic diet 2000 calorie, and renal diet    Referrals/ Consults  Physical Therapy to evaluate and treat. Evaluate for home safety and equipment needs; Establish/upgrade home exercise program. Perform / instruct on therapeutic exercises, gait training, transfer training, and Range of Motion.  Occupational Therapy to evaluate and treat. Evaluate home environment for safety and equipment needs. Perform/Instruct on transfers, ADL training, ROM, and therapeutic exercises.  Aide to provide assistance " with personal care, ADLs, and vital signs.    Activities:   activity as tolerated    Nursing:   Agency to admit patient within 24 hours of hospital discharge unless specified on physician order or at patient request    SN to complete comprehensive assessment including routine vital signs. Instruct on disease process and s/s of complications to report to MD. Review/verify medication list sent home with the patient at time of discharge  and instruct patient/caregiver as needed. Frequency may be adjusted depending on start of care date.     Skilled nurse to perform up to 3 visits PRN for symptoms related to diagnosis    Notify MD if SBP > 160 or < 90; DBP > 90 or < 50; HR > 120 or < 50; Temp > 101; O2 < 88%;     Ok to schedule additional visits based on staff availability and patient request on consecutive days within the home health episode.    When multiple disciplines ordered:    Start of Care occurs on Sunday - Wednesday schedule remaining discipline evaluations as ordered on separate consecutive days following the start of care.    Thursday SOC -schedule subsequent evaluations Friday and Monday the following week.     Friday - Saturday SOC - schedule subsequent discipline evaluations on consecutive days starting Monday of the following week.    For all post-discharge communication and subsequent orders please contact patient's primary care physician. If unable to reach primary care physician or do not receive response within 30 minutes, please contact PCP for clinical staff order clarification    Miscellaneous   Routine Skin for Bedridden Patients: Instruct patient/caregiver to apply moisture barrier cream to all skin folds and wet areas in perineal area daily and after baths and all bowel movements.    Home Health Aide:  Nursing Weekly, Physical Therapy Three times weekly, Occupational Therapy Three times weekly, Medical Social Work Weekly, and Home Health Aide Weekly    Wound Care Orders    Clean buttock/sacral  area with bath wipes or soap/water; apply zinc barrier cream daily and prn. May cover with a silicone border foam.     I certify that this patient is confined to her home and needs intermittent skilled nursing care, physical therapy, and occupational therapy.

## 2024-07-01 NOTE — PLAN OF CARE
Problem: Infection  Goal: Absence of Infection Signs and Symptoms  Outcome: Progressing     Problem: Adult Inpatient Plan of Care  Goal: Plan of Care Review  Outcome: Progressing  Goal: Absence of Hospital-Acquired Illness or Injury  Outcome: Progressing  Goal: Optimal Comfort and Wellbeing  Outcome: Progressing     Problem: Bariatric Environmental Safety  Goal: Safety Maintained with Care  Outcome: Progressing     Problem: Skin Injury Risk Increased  Goal: Skin Health and Integrity  Outcome: Progressing     Problem: Diabetes Comorbidity  Goal: Blood Glucose Level Within Targeted Range  Outcome: Progressing     Problem: Sepsis/Septic Shock  Goal: Absence of Bleeding  Outcome: Progressing  Goal: Blood Glucose Level Within Targeted Range  Outcome: Progressing     Problem: Fall Injury Risk  Goal: Absence of Fall and Fall-Related Injury  Outcome: Progressing     Problem: Pain Acute  Goal: Optimal Pain Control and Function  Outcome: Progressing

## 2024-07-01 NOTE — SUBJECTIVE & OBJECTIVE
Interval History:   Seen today in HD unit, plan for HD Today    Review of patient's allergies indicates:   Allergen Reactions    Ciprofloxacin Itching    Iodine      Kidney injury    Pcn [penicillins]      Rash; tolerated ceftriaxone on 1/13/20     Current Facility-Administered Medications   Medication Frequency    acetaminophen tablet 1,000 mg Q8H PRN    acetaminophen tablet 650 mg Q6H PRN    allopurinol split tablet 50 mg Every other day    apixaban tablet 5 mg BID    atorvastatin tablet 80 mg Daily    barium sulfate (READI-CAT) suspension 450 mL ONCE PRN    calcitRIOL capsule 0.5 mcg Daily    calcium acetate(phosphat bind) capsule 667 mg TID WM    carvediloL tablet 6.25 mg BID    clopidogreL tablet 75 mg Daily    dextrose 10% bolus 125 mL 125 mL PRN    dextrose 10% bolus 250 mL 250 mL PRN    diphenhydrAMINE capsule 25 mg Q6H PRN    diphenhydrAMINE injection 50 mg On Call Procedure    epoetin nina injection 4,000 Units Every Mon, Wed, Fri    famotidine tablet 20 mg Daily    furosemide tablet 40 mg Daily    gabapentin capsule 100 mg BID    glucagon (human recombinant) injection 1 mg PRN    glucose chewable tablet 16 g PRN    glucose chewable tablet 24 g PRN    heparin (porcine) injection 1,000 Units PRN    hydrALAZINE tablet 50 mg TID    hydrocortisone 2.5 % cream BID PRN    hydrOXYzine HCL tablet 25 mg TID PRN    insulin aspart U-100 pen 0-5 Units QID (AC + HS) PRN    insulin aspart U-100 pen 3 Units TIDWM    melatonin tablet 6 mg Nightly PRN    naloxone 0.4 mg/mL injection 0.02 mg PRN    prochlorperazine injection Soln 5 mg Q6H PRN    sertraline tablet 100 mg Daily    sodium chloride 0.9% flush 10 mL Q12H PRN    trazodone split tablet 25 mg QHS       Objective:     Vital Signs (Most Recent):  Temp: 97.8 °F (36.6 °C) (07/01/24 1410)  Pulse: 71 (07/01/24 1424)  Resp: 18 (07/01/24 1410)  BP: (!) 163/62 (07/01/24 1424)  SpO2: 97 % (07/01/24 1410) Vital Signs (24h Range):  Temp:  [97.2 °F (36.2 °C)-98.2 °F (36.8 °C)]  "97.8 °F (36.6 °C)  Pulse:  [70-79] 71  Resp:  [16-18] 18  SpO2:  [92 %-99 %] 97 %  BP: (111-163)/(55-65) 163/62     Weight: 80.5 kg (177 lb 7.5 oz) (07/01/24 0400)  Body mass index is 54.16 kg/m².  Body surface area is 1.65 meters squared.    I/O last 3 completed shifts:  In: 660 [P.O.:660]  Out: -      Physical Exam  Vitals reviewed.   Constitutional:       Appearance: Normal appearance.   HENT:      Head: Normocephalic.      Mouth/Throat:      Mouth: Mucous membranes are moist.   Cardiovascular:      Rate and Rhythm: Normal rate and regular rhythm.      Pulses: Normal pulses.   Pulmonary:      Effort: Pulmonary effort is normal.      Breath sounds: Normal breath sounds.   Musculoskeletal:      Cervical back: Normal range of motion.      Comments: Bilateral knee amputation   Skin:     General: Skin is warm.   Neurological:      General: No focal deficit present.      Mental Status: She is alert and oriented to person, place, and time.   Psychiatric:         Mood and Affect: Mood normal.         Behavior: Behavior normal.          Significant Labs:  ABGs: No results for input(s): "PH", "PCO2", "HCO3", "POCSATURATED", "BE" in the last 168 hours.  BMP:   Recent Labs   Lab 06/26/24  0837   *   *   K 4.7      CO2 22*   BUN 46*   CREATININE 4.6*   CALCIUM 9.4   MG 2.2     All labs within the past 24 hours have been reviewed.     Significant Imaging:    "

## 2024-07-01 NOTE — PROGRESS NOTES
07/01/24 1415 07/01/24 1424        Hemodialysis Catheter left internal jugular   No placement date or time found.   Present Prior to Hospital Arrival?: No  Hand Hygiene: Performed  Barrier Precautions: Performed  Skin Antisepsis: ChloraPrep  Hemodialysis Catheter Type: Tunneled catheter  Location: left internal jugular  Catheter S...   Site Assessment No drainage  --    Line Securement Device Secured with sutureless device  --    Dressing Type CHG impregnated dressing/sponge  --    Dressing Status Clean;Dry;Intact  --    Dressing Intervention Integrity maintained  --    Date on Dressing 06/30/24  --    Dressing Due to be Changed 07/07/24  --    Venous Patency/Care accessed;blood return present;flushed w/o difficulty  --    Arterial Patency/Care accessed;blood return present;flushed w/o difficulty  --    During Hemodialysis Assessment   Blood Flow Rate (mL/min)  --  400 mL/min   Dialysate Flow Rate (mL/min)  --  700 ml/min   Ultrafiltration Rate (mL/Hr)  --  380 mL/Hr   Arteriovenous Lines Secure  --  Yes   Arterial Pressure (mmHg)  --  -140 mmHg   Venous Pressure (mmHg)  --  170   Blood Volume Processed (Liters)  --  0 L   UF Removed (mL)  --  0 mL   TMP  --  30   Venous Line in Air Detector  --  Yes   Intake (mL)  --  250 mL   Transducer Dry  --  Yes   Access Visible  --  Yes    notified of access issue?  --  N/A   Heparin given?  --  N/A   Intra-Hemodialysis Comments  --  HD started     Patient arrived [facility] by bed. Maintenance HD started.

## 2024-07-01 NOTE — PROGRESS NOTES
[facility]  Nephrology  Progress Note    Patient Name: Suyapa Connelly  MRN: [id number]  Admission Date: 6/21/2024  Hospital Length of Stay: 9 days  Attending Provider: Rodríguez Pratt MD   Primary Care Physician: Feliciano Nguyen III PASahilC  Principal Problem:Acute encephalopathy    Subjective:     HPI: The patient is a 58 y.o. Black or  Female with multiple co morbidities including HTN, GERD, HLD, PVD, ESRD on HD MFW, b/l AKAs, CAD s/p CABG, afib on eliquis, depression, and anxiety  who presents to ED on 6/21/2024 for evaluation of confusion per  at bedside. Per patient's  she has been more sleepy and less interactive over the past several days. He reports that her urine has been foul-smelling and more cloudy. The patient denies CP, SOB, abdominal pain, or dysuria. Per  patient was dialyzed today a full dialysis and they came straight here after. She had a full HD treatment yesterday, 6/21.     In the ED, VSS, afebrile. CBC with stable leukopenia and anemia. Na 129, Chloride 94, Bicarb 22, Glucose 50. POC glucose 150. Cr 2.7 (consistent with ESRD). Albumin 3.2. Tbili 1.1. Mag 2.1. UA with 3+ leukocytosis, 74 WBCs, but no bacteria. CTH with no acute finding or detrimental change when compared with 06/11/2024, allowing for motion and artifact limitations. The patient received 1g IV Rocephin. Nephrology consulted for management of ESRD and HD treatment.    Interval History:   Seen today in HD unit, plan for HD Today    Review of patient's allergies indicates:   Allergen Reactions    Ciprofloxacin Itching    Iodine      Kidney injury    Pcn [penicillins]      Rash; tolerated ceftriaxone on 1/13/20     Current Facility-Administered Medications   Medication Frequency    acetaminophen tablet 1,000 mg Q8H PRN    acetaminophen tablet 650 mg Q6H PRN    allopurinol split tablet 50 mg Every other day    apixaban tablet 5 mg BID    atorvastatin tablet 80 mg Daily    barium  sulfate (READI-CAT) suspension 450 mL ONCE PRN    calcitRIOL capsule 0.5 mcg Daily    calcium acetate(phosphat bind) capsule 667 mg TID WM    carvediloL tablet 6.25 mg BID    clopidogreL tablet 75 mg Daily    dextrose 10% bolus 125 mL 125 mL PRN    dextrose 10% bolus 250 mL 250 mL PRN    diphenhydrAMINE capsule 25 mg Q6H PRN    diphenhydrAMINE injection 50 mg On Call Procedure    epoetin nina injection 4,000 Units Every Mon, Wed, Fri    famotidine tablet 20 mg Daily    furosemide tablet 40 mg Daily    gabapentin capsule 100 mg BID    glucagon (human recombinant) injection 1 mg PRN    glucose chewable tablet 16 g PRN    glucose chewable tablet 24 g PRN    heparin (porcine) injection 1,000 Units PRN    hydrALAZINE tablet 50 mg TID    hydrocortisone 2.5 % cream BID PRN    hydrOXYzine HCL tablet 25 mg TID PRN    insulin aspart U-100 pen 0-5 Units QID (AC + HS) PRN    insulin aspart U-100 pen 3 Units TIDWM    melatonin tablet 6 mg Nightly PRN    naloxone 0.4 mg/mL injection 0.02 mg PRN    prochlorperazine injection Soln 5 mg Q6H PRN    sertraline tablet 100 mg Daily    sodium chloride 0.9% flush 10 mL Q12H PRN    trazodone split tablet 25 mg QHS       Objective:     Vital Signs (Most Recent):  Temp: 97.8 °F (36.6 °C) (07/01/24 1410)  Pulse: 71 (07/01/24 1424)  Resp: 18 (07/01/24 1410)  BP: (!) 163/62 (07/01/24 1424)  SpO2: 97 % (07/01/24 1410) Vital Signs (24h Range):  Temp:  [97.2 °F (36.2 °C)-98.2 °F (36.8 °C)] 97.8 °F (36.6 °C)  Pulse:  [70-79] 71  Resp:  [16-18] 18  SpO2:  [92 %-99 %] 97 %  BP: (111-163)/(55-65) 163/62     Weight: 80.5 kg (177 lb 7.5 oz) (07/01/24 0400)  Body mass index is 54.16 kg/m².  Body surface area is 1.65 meters squared.    I/O last 3 completed shifts:  In: 660 [P.O.:660]  Out: -      Physical Exam  Vitals reviewed.   Constitutional:       Appearance: Normal appearance.   HENT:      Head: Normocephalic.      Mouth/Throat:      Mouth: Mucous membranes are moist.   Cardiovascular:      Rate and  "Rhythm: Normal rate and regular rhythm.      Pulses: Normal pulses.   Pulmonary:      Effort: Pulmonary effort is normal.      Breath sounds: Normal breath sounds.   Musculoskeletal:      Cervical back: Normal range of motion.      Comments: Bilateral knee amputation   Skin:     General: Skin is warm.   Neurological:      General: No focal deficit present.      Mental Status: She is alert and oriented to person, place, and time.   Psychiatric:         Mood and Affect: Mood normal.         Behavior: Behavior normal.          Significant Labs:  ABGs: No results for input(s): "PH", "PCO2", "HCO3", "POCSATURATED", "BE" in the last 168 hours.  BMP:   Recent Labs   Lab 06/26/24  0837   *   *   K 4.7      CO2 22*   BUN 46*   CREATININE 4.6*   CALCIUM 9.4   MG 2.2     All labs within the past 24 hours have been reviewed.     Significant Imaging:    Assessment/Plan:     Neuro  * Acute encephalopathy  - defer TORRES to primary team     Renal/  ESRD (end stage renal disease)  58 y.o. Black or  Female ESRD-HD M-W-F presents to ED on 6/21/2024 with diagnosis of: Hypoglycemia [E16.2];Encephalopathy [G93.40];Chest pain [R07.9];Urinary tract infection with hematuria, site unspecified [N39.0, R31.9]   Nephrology consulted for inpatient ESRD-HD management    Outpatient HD Information:  -Dialysis modality: Hemodialysis  -Outpatient HD unit: [facility]  -Nephrologist: ?  -HD TX days: Monday/Wednesday/Friday, duration of treatment: 3 hrs   -Last HD TX prior to hospital admission: 6/21/24  -Dialysis access: dialysis catheter   -Residual urine: Minium  -EDW: 67 kg     Assessment:   - Dialysis for metabolic clearance and volume management will be provided today.  -obtain recent labs  - check weight before and after HD    Anemia of ESRD   Recent Labs   Lab 06/25/24  0904 06/26/24  0837 06/30/24  0657   WBC 3.79* 5.11 4.24   HGB 9.6* 9.7* 10.0*   HCT 29.2* 30.4* 32.3*    173 158       Lab Results "   Component Value Date    FESATURATED 35 06/22/2024    FERRITIN 757 (H) 06/22/2024       - Goal in ESRD is Hgb of 10-11. Near target.   - continue EPO 50 units /kg    Mineral Bone Disease in ESRD   Lab Results   Component Value Date    PTH 1,132 (H) 06/17/2024    CALCIUM 9.4 06/26/2024    ALBUMIN 3.0 (L) 06/26/2024    CAION 0.92 (L) 05/17/2023    PHOS 3.7 06/26/2024             Thank you for your consult. I will follow-up with patient. Please contact us if you have any additional questions.    Trino Miner MD  Nephrology  [facility] - Observation 11[facility]

## 2024-07-02 LAB
POCT GLUCOSE: 113 MG/DL (ref 70–110)
POCT GLUCOSE: 156 MG/DL (ref 70–110)
POCT GLUCOSE: 181 MG/DL (ref 70–110)
POCT GLUCOSE: 207 MG/DL (ref 70–110)

## 2024-07-02 PROCEDURE — 25000003 PHARM REV CODE 250: Mod: HCNC | Performed by: NURSE PRACTITIONER

## 2024-07-02 PROCEDURE — 25000003 PHARM REV CODE 250: Mod: HCNC

## 2024-07-02 PROCEDURE — 11000001 HC ACUTE MED/SURG PRIVATE ROOM: Mod: HCNC

## 2024-07-02 RX ORDER — SODIUM CHLORIDE 9 MG/ML
INJECTION, SOLUTION INTRAVENOUS ONCE
Status: CANCELLED | OUTPATIENT
Start: 2024-07-03 | End: 2024-07-03

## 2024-07-02 RX ORDER — HEPARIN SODIUM 1000 [USP'U]/ML
1000 INJECTION, SOLUTION INTRAVENOUS; SUBCUTANEOUS
Status: CANCELLED | OUTPATIENT
Start: 2024-07-03 | End: 2024-07-04

## 2024-07-02 RX ADMIN — ATORVASTATIN CALCIUM 80 MG: 40 TABLET, FILM COATED ORAL at 09:07

## 2024-07-02 RX ADMIN — CALCIUM ACETATE 667 MG: 667 CAPSULE ORAL at 05:07

## 2024-07-02 RX ADMIN — TRAZODONE HYDROCHLORIDE 25 MG: 50 TABLET ORAL at 09:07

## 2024-07-02 RX ADMIN — SERTRALINE 100 MG: 100 TABLET, FILM COATED ORAL at 09:07

## 2024-07-02 RX ADMIN — INSULIN ASPART 3 UNITS: 100 INJECTION, SOLUTION INTRAVENOUS; SUBCUTANEOUS at 09:07

## 2024-07-02 RX ADMIN — INSULIN ASPART 2 UNITS: 100 INJECTION, SOLUTION INTRAVENOUS; SUBCUTANEOUS at 04:07

## 2024-07-02 RX ADMIN — INSULIN ASPART 3 UNITS: 100 INJECTION, SOLUTION INTRAVENOUS; SUBCUTANEOUS at 11:07

## 2024-07-02 RX ADMIN — CALCIUM ACETATE 667 MG: 667 CAPSULE ORAL at 12:07

## 2024-07-02 RX ADMIN — GABAPENTIN 100 MG: 100 CAPSULE ORAL at 09:07

## 2024-07-02 RX ADMIN — INSULIN ASPART 3 UNITS: 100 INJECTION, SOLUTION INTRAVENOUS; SUBCUTANEOUS at 04:07

## 2024-07-02 RX ADMIN — FUROSEMIDE 40 MG: 40 TABLET ORAL at 09:07

## 2024-07-02 RX ADMIN — CARVEDILOL 6.25 MG: 6.25 TABLET, FILM COATED ORAL at 09:07

## 2024-07-02 RX ADMIN — CALCIUM ACETATE 667 MG: 667 CAPSULE ORAL at 09:07

## 2024-07-02 RX ADMIN — ALLOPURINOL 50 MG: 300 TABLET ORAL at 09:07

## 2024-07-02 RX ADMIN — FAMOTIDINE 20 MG: 20 TABLET ORAL at 09:07

## 2024-07-02 RX ADMIN — APIXABAN 5 MG: 5 TABLET, FILM COATED ORAL at 09:07

## 2024-07-02 RX ADMIN — CALCITRIOL 0.5 MCG: 0.5 CAPSULE, LIQUID FILLED ORAL at 09:07

## 2024-07-02 RX ADMIN — ACETAMINOPHEN 1000 MG: 500 TABLET ORAL at 03:07

## 2024-07-02 RX ADMIN — ACETAMINOPHEN 1000 MG: 500 TABLET ORAL at 09:07

## 2024-07-02 RX ADMIN — CLOPIDOGREL BISULFATE 75 MG: 75 TABLET ORAL at 09:07

## 2024-07-02 NOTE — PLAN OF CARE
8:26 AM EDUARD spoke to Josephine with [facility] to inform that patient may be DC today and will need resumed HH. SW faxed orders via [facility] and they will continue to follow.     9:27 AM EDUARD spoke with Janessa with [facility] who stated that they filed a report to Adult Protective Service because spouse dropped patient off stating that he could not care for her anymore prior to her hospital admission. EDUARD spoke with Stevie with [phone], who stated that he will report at the bedside to meet with the patient due to no luck and bogus addresses provided by the spouse. SW will continue to follow up.     EDUARD emailed pt's H&[place] to [email or web address]     2:47 PM EDUARD spoke to Stevie with [facility] who confirmed that he completed his eval at the bedside and no additional info is needed.     Valery Grewal, MSW  [facility]  Ext. [phone]

## 2024-07-02 NOTE — PROGRESS NOTES
[facility] [address] [place] [address] Medicine  Progress Note    Patient Name: Suyapa Connelly  MRN: [id number]  Patient Class: IP- Inpatient   Admission Date: 6/21/2024  Length of Stay: 10 days  Attending Physician: Rodríguez Pratt MD  Primary Care Provider: Feliciano Nguyen III, PA-C        Subjective:     Principal Problem:Acute encephalopathy        HPI:  Suyapa Connelly is a 58 y.o. female with a PMHx of HTN, GERD, HLD, PVD, ESRD on HD MFW, b/l AKAs, CAD s/p CABG, afib on eliquis, depression, and anxiety who presents to the ED for evaluation of confusion. HPI and ROS limited secondary to patient's mental status and poor participation. Per patient's  she has been more sleepy and less interactive over the past several days. He reports that her urine has been foul-smelling and more cloudy. He states she was also peeing more frequently in complaining of burning with urination and some occasional suprapubic pain. Denies any fevers or chills. The patient denies CP, SOB, abdominal pain, or dysuria. Per  patient was dialyzed today a full dialysis and they came straight here after.     In the ED, VSS, afebrile. CBC with stable leukopenia and anemia. Na 129, Chloride 94, Bicarb 22, Glucose 50. POC glucose 150. Cr 2.7 (consistent with ESRD). Albumin 3.2. Tbili 1.1. Mag 2.1. UA with 3+ leukocytosis, 74 WBCs, but no bacteria. CTH with no acute finding or detrimental change when compared with 06/11/2024, allowing for motion and artifact limitations. The patient received 1g IV Rocephin.    Overview/Hospital Course:  Suyapa Connelly is a 58 y.o. F who was admitted to  for further evaluation of acute encephalopathy. Most likely 2/2 UTI, other workup negative. AAOx3 now. Initiated rocephin for infectious appearing UA. Following culture - grew Candida krusei, stopped rocephin. ID consulted for recommendations. Recommend deferring treatment, obtain CT abd/pelvis. CT with findings of volume overload, otherwise  no acute intraabdominal abnormality. Given x1 120mg IV lasix. Repeating UA with straight cath and following cx per ID recs. Nephrology following for HD management: HD performed 6/24 and 6/26. Psych consulted for recommendations: continue zoloft, add trazodone qhs. CM assisting with discharge planning.       Interval History:     NAEON.  Planning for DC home with DME.  Delayed due to concerns of outpatient HD center and APS delay / diligence.  This is now complete and APS has no concerns about safety of DC home.        Review of Systems   Constitutional:  Positive for fatigue. Negative for activity change, appetite change, chills, diaphoresis and fever.   HENT:  Negative for congestion, facial swelling, hearing loss, nosebleeds, sinus pressure, sinus pain, sneezing, sore throat, tinnitus, trouble swallowing and voice change.    Eyes:  Negative for photophobia, pain, discharge and visual disturbance.   Respiratory:  Negative for cough, chest tightness, shortness of breath and wheezing.    Cardiovascular:  Negative for chest pain, palpitations and leg swelling.   Gastrointestinal:  Negative for abdominal distention, abdominal pain, blood in stool, constipation, diarrhea, nausea and vomiting.   Endocrine: Negative for cold intolerance, heat intolerance and polyuria.   Genitourinary:  Negative for decreased urine volume, difficulty urinating, dysuria, flank pain, frequency and urgency.   Musculoskeletal:  Negative for arthralgias, gait problem, myalgias, neck pain and neck stiffness.   Skin:  Negative for rash and wound.   Neurological:  Positive for weakness. Negative for dizziness, speech difficulty, light-headedness and headaches.   Hematological:  Negative for adenopathy. Does not bruise/bleed easily.   Psychiatric/Behavioral:  Negative for agitation, confusion, self-injury, sleep disturbance and suicidal ideas. The patient is not nervous/anxious.      Objective:     Vital Signs (Most Recent):  Temp: 98.2 °F (36.8  °C) (07/02/24 1553)  Pulse: 72 (07/02/24 1553)  Resp: 18 (07/02/24 1553)  BP: (!) 118/53 (07/02/24 1553)  SpO2: 100 % (07/02/24 1553) Vital Signs (24h Range):  Temp:  [97.7 °F (36.5 °C)-98.6 °F (37 °C)] 98.2 °F (36.8 °C)  Pulse:  [65-78] 72  Resp:  [17-19] 18  SpO2:  [93 %-100 %] 100 %  BP: (118-157)/(53-84) 118/53     Weight: 80.5 kg (177 lb 7.5 oz)  Body mass index is 54.16 kg/m².    Intake/Output Summary (Last 24 hours) at 7/2/2024 1643  Last data filed at 7/1/2024 1723  Gross per 24 hour   Intake --   Output 1650 ml   Net -1650 ml         Physical Exam  Vitals and nursing note reviewed.   Constitutional:       Appearance: She is well-developed.   Eyes:      Pupils: Pupils are equal, round, and reactive to light.   Cardiovascular:      Rate and Rhythm: Normal rate and regular rhythm.   Pulmonary:      Effort: Pulmonary effort is normal.      Breath sounds: Normal breath sounds.   Abdominal:      Palpations: Abdomen is soft.      Tenderness: There is no abdominal tenderness.   Musculoskeletal:         General: No tenderness.      Comments: B/L LE amputations noted   Skin:     General: Skin is warm and dry.   Neurological:      Mental Status: She is alert.      Comments: Oriented to person, place and year   Psychiatric:         Behavior: Behavior normal.             Significant Labs: All pertinent labs within the past 24 hours have been reviewed.    Significant Imaging: I have reviewed all pertinent imaging results/findings within the past 24 hours.    Assessment/Plan:      * Acute encephalopathy  Resolved  Patient has acute metabolic encephalopathy that likely secondary to UTI.Treatment for underlying cause is underway. Pt with recurrent admissions for encephalopathy with extensive work ups including EEG, CTH, MRI brain, and vitamin panels. Will monitor neuro status carefully, avoid narcotics and benzos that will exacerbate agitation, and use PRN anti-psychotics for controls of behavior for self harm.  -UA  concerning for infection- growing candida krusei  -CT abd/pelvis - no acute intraabdominal abnormality  -CXR with diffuse increased interstitial and parenchymal attenuation similar to prior study. No large region of confluent airspace consolidation or substantial volume of pleural fluid identified.   -CTH-negative  - ID consulted  1. Monitor clinically  2. Rec laxatives for constipation  3. Abx and antifungals not recommended  4. Seen with ID staff   -Delirium precautions   - reports progressive decline in patient over the last few months. Initiated NH paperwork for patient. CM notified for assistance, patient is medically ready    Severe obesity (BMI >= 40)  Body mass index is 54.09 kg/m². Morbid obesity complicates all aspects of disease management from diagnostic modalities to treatment.     ESRD (end stage renal disease)  Chronic kidney disease-mineral and bone disorder   MWF schedule, last dialyzed 6/26  Residual renal function?- Yes  - Nephrology consulted  - Continue chronic hemodialysis  - Monitor daily electrolytes and defer dialysis orders to nephrology  - Renally dose medications  - Renal diet  - Continue phosphorus binders  - Daily weights/strict I&Os  - 1.5L FR    Debility  Patient with Chronic debility due to other reduced mobility. Latest AMPAC and GEMS scores have not been reviewed. Plan includes progressive mobility protocol initated and fall precautions in place.  - family initiated NH paperwork  - CM notified    S/P AKA (above knee amputation) bilateral  History noted.  -Fall precautions.    Chronic combined systolic and diastolic heart failure  Patient is identified as having Combined Systolic and Diastolic heart failure that is Chronic. CHF is currently controlled. Latest ECHO performed and demonstrates- Results for orders placed during the hospital encounter of 05/07/24    Echo    Interpretation Summary    Left Ventricle: The left ventricle is mildly dilated. Ventricular mass is normal.  "Normal wall thickness. Severe global hypokinesis present. There is severely reduced systolic function. Ejection fraction by visual approximation is 15%. There is diastolic dysfunction.    Right Ventricle: Severe right ventricular enlargement. Wall thickness is normal. Right ventricle wall motion has global hypokinesis. Systolic function is mildly reduced.    Left Atrium: Left atrium is severely dilated. There is an atrial septal aneurysm.    Right Atrium: Right atrium is dilated.    Aortic Valve: There is mild aortic regurgitation.    Mitral Valve: There is mild regurgitation.    Tricuspid Valve: There is annular dilation present. There is normal leaflet mobility. There is severe functional regurgitation.    Pulmonary Artery: The estimated pulmonary artery systolic pressure is at least 24 mmHg. PASP is likely under-estimated in the setting of severe tricuspid regurgitation.    IVC/SVC: Central venous pressure of at least 8 mmHg.    No vegetation seen.  . Continue Beta Blocker, lasix and monitor clinical status closely. Monitor on telemetry. Patient is off CHF pathway.  Monitor strict Is&Os and daily weights.  Place on fluid restriction of 1.5 L. Continue to stress to patient importance of self efficacy and  on diet for CHF. Last BNP reviewed- and noted below No results for input(s): "BNP", "BNPTRIAGEBLO" in the last 168 hours..  -Volume management with HD.      Hyponatremia  Patient has hyponatremia which is uncontrolled,We will aim to correct the sodium by 4-6mEq in 24 hours. We will monitor sodium Daily. The hyponatremia is due to Medications: SSRIs and renal insufficiency. We will obtain the following studies: Urine sodium, urine osmolality, serum osmolality. We will treat the hyponatremia with Hemodialysis and Removal of offending medications. The patient's sodium results have been reviewed and are listed below.  No results for input(s): "NA" in the last 24 hours.  - patient with chronic hyponatremia most " likely 2/2 ESRD    Paroxysmal atrial fibrillation  Patient with Paroxysmal (<7 days) atrial fibrillation which is controlled currently with Beta Blocker. Patient is currently in sinus rhythm.[id number] Score: 3. Anticoagulation indicated. Anticoagulation done with eliquis .    Anemia due to chronic kidney disease, on chronic dialysis  Patient's anemia is currently controlled. Has not received any PRBCs to date. Etiology likely d/t chronic disease due to ESRD  Current CBC reviewed-   Lab Results   Component Value Date    HGB 9.7 (L) 06/26/2024    HCT 30.4 (L) 06/26/2024     Monitor serial CBC and transfuse if patient becomes hemodynamically unstable, symptomatic or H/H drops below 7/21.    CAD (coronary artery disease)  Patient with known CAD s/p CABG, which is controlled Will continue Plavix and Statin and monitor for S/Sx of angina/ACS. Continue to monitor on telemetry.     Type 2 diabetes mellitus with hyperglycemia, with long-term current use of insulin  Patient's FSGs are not controlled on current hypoglycemics due to hypoglycemia.   Last A1c reviewed-   Lab Results   Component Value Date    HGBA1C 8.0 (H) 04/24/2024     Most recent fingerstick glucose reviewed-   Recent Labs   Lab 06/26/24  1629 06/26/24  2154 06/27/24  0800 06/27/24  1119   POCTGLUCOSE 164* 252* 170* 147*       Current correctional scale  Low  Maintain anti-hyperglycemic dose as follows-   Antihyperglycemics (From admission, onward)      Start     Stop Route Frequency Ordered    06/24/24 1645  insulin aspart U-100 pen 3 Units         -- SubQ 3 times daily with meals 06/24/24 1335    06/22/24 0032  insulin aspart U-100 pen 0-5 Units         -- SubQ Before meals & nightly PRN 06/21/24 2332        -Pt initially hypoglycemic which improved with D10 bolus.   -Accuchecks AC/HS    Acute cystitis without hematuria  -Pt denies any abd pain, dysuria, urinary frequency, or fever/chills. Per  patient's urine has been foul smelling and cloudy. Per   patient has also been complaining of suprapubic pain and dysuria at home.  -Afebrile, WBC 3.80 (chronic leukopenia).  -UA reviewed, follow urine cx.  -Pt received IV rocephin in the ED, continue for now.  -culture with candida krusei  - ID consulted for recommendations - no tx indicated        CAROLIN (generalized anxiety disorder)  -Chronic, uncontrolled.  -Hold zoloft for now pending hyponatremia work up.   -Can consider psychiatry consult - consult placed:   - recommend continuing zoloft, trazadone 25mg qhs    Peripheral vascular disease  -Chronic issue.  -Continue plavix and statin.      VTE Risk Mitigation (From admission, onward)           Ordered     heparin (porcine) injection 1,000 Units  As needed (PRN)         06/24/24 1001     apixaban tablet 5 mg  2 times daily         06/21/24 2332     IP VTE HIGH RISK PATIENT  Once         06/21/24 2332     Reason for No Pharmacological VTE Prophylaxis  Once        Question:  Reasons:  Answer:  Already adequately anticoagulated on oral Anticoagulants    06/21/24 2332                    Discharge Planning   DEVAN: 7/3/2024     Code Status: Full Code   Is the patient medically ready for discharge?:     Reason for patient still in hospital (select all that apply): Pending disposition  Discharge Plan A: New Nursing Home placement - halfway care facility                  Rodríguez Pratt MD  Department of Hospital Medicine   [facility]

## 2024-07-02 NOTE — PLAN OF CARE
Problem: Infection  Goal: Absence of Infection Signs and Symptoms  Outcome: Progressing     Problem: Adult Inpatient Plan of Care  Goal: Plan of Care Review  Outcome: Progressing  Flowsheets (Taken 7/2/2024 5386)  Plan of Care Reviewed With:   patient   spouse  Goal: Patient-Specific Goal (Individualized)  Outcome: Progressing  Goal: Optimal Comfort and Wellbeing  Outcome: Progressing     Problem: Skin Injury Risk Increased  Goal: Skin Health and Integrity  Outcome: Progressing     Problem: Diabetes Comorbidity  Goal: Blood Glucose Level Within Targeted Range  Outcome: Progressing     Problem: Sepsis/Septic Shock  Goal: Optimal Coping  Outcome: Progressing     Problem: Fall Injury Risk  Goal: Absence of Fall and Fall-Related Injury  Outcome: Progressing

## 2024-07-02 NOTE — SUBJECTIVE & OBJECTIVE
Interval History:     NAEON.  Planning for DC home with DME.  Delayed due to concerns of outpatient HD center and APS delay / diligence.  This is now complete and APS has no concerns about safety of DC home.        Review of Systems   Constitutional:  Positive for fatigue. Negative for activity change, appetite change, chills, diaphoresis and fever.   HENT:  Negative for congestion, facial swelling, hearing loss, nosebleeds, sinus pressure, sinus pain, sneezing, sore throat, tinnitus, trouble swallowing and voice change.    Eyes:  Negative for photophobia, pain, discharge and visual disturbance.   Respiratory:  Negative for cough, chest tightness, shortness of breath and wheezing.    Cardiovascular:  Negative for chest pain, palpitations and leg swelling.   Gastrointestinal:  Negative for abdominal distention, abdominal pain, blood in stool, constipation, diarrhea, nausea and vomiting.   Endocrine: Negative for cold intolerance, heat intolerance and polyuria.   Genitourinary:  Negative for decreased urine volume, difficulty urinating, dysuria, flank pain, frequency and urgency.   Musculoskeletal:  Negative for arthralgias, gait problem, myalgias, neck pain and neck stiffness.   Skin:  Negative for rash and wound.   Neurological:  Positive for weakness. Negative for dizziness, speech difficulty, light-headedness and headaches.   Hematological:  Negative for adenopathy. Does not bruise/bleed easily.   Psychiatric/Behavioral:  Negative for agitation, confusion, self-injury, sleep disturbance and suicidal ideas. The patient is not nervous/anxious.      Objective:     Vital Signs (Most Recent):  Temp: 98.2 °F (36.8 °C) (07/02/24 1553)  Pulse: 72 (07/02/24 1553)  Resp: 18 (07/02/24 1553)  BP: (!) 118/53 (07/02/24 1553)  SpO2: 100 % (07/02/24 1553) Vital Signs (24h Range):  Temp:  [97.7 °F (36.5 °C)-98.6 °F (37 °C)] 98.2 °F (36.8 °C)  Pulse:  [65-78] 72  Resp:  [17-19] 18  SpO2:  [93 %-100 %] 100 %  BP: (118-157)/(53-84)  118/53     Weight: 80.5 kg (177 lb 7.5 oz)  Body mass index is 54.16 kg/m².    Intake/Output Summary (Last 24 hours) at 7/2/2024 1643  Last data filed at 7/1/2024 1723  Gross per 24 hour   Intake --   Output 1650 ml   Net -1650 ml         Physical Exam  Vitals and nursing note reviewed.   Constitutional:       Appearance: She is well-developed.   Eyes:      Pupils: Pupils are equal, round, and reactive to light.   Cardiovascular:      Rate and Rhythm: Normal rate and regular rhythm.   Pulmonary:      Effort: Pulmonary effort is normal.      Breath sounds: Normal breath sounds.   Abdominal:      Palpations: Abdomen is soft.      Tenderness: There is no abdominal tenderness.   Musculoskeletal:         General: No tenderness.      Comments: B/L LE amputations noted   Skin:     General: Skin is warm and dry.   Neurological:      Mental Status: She is alert.      Comments: Oriented to person, place and year   Psychiatric:         Behavior: Behavior normal.             Significant Labs: All pertinent labs within the past 24 hours have been reviewed.    Significant Imaging: I have reviewed all pertinent imaging results/findings within the past 24 hours.

## 2024-07-03 LAB
ANION GAP SERPL CALC-SCNC: 8 MMOL/L (ref 8–16)
BASOPHILS # BLD AUTO: 0.05 K/UL (ref 0–0.2)
BASOPHILS NFR BLD: 1.5 % (ref 0–1.9)
BUN SERPL-MCNC: 24 MG/DL (ref 6–20)
CALCIUM SERPL-MCNC: 8.5 MG/DL (ref 8.7–10.5)
CHLORIDE SERPL-SCNC: 103 MMOL/L (ref 95–110)
CO2 SERPL-SCNC: 20 MMOL/L (ref 23–29)
CREAT SERPL-MCNC: 2.6 MG/DL (ref 0.5–1.4)
DIFFERENTIAL METHOD BLD: ABNORMAL
EOSINOPHIL # BLD AUTO: 0.1 K/UL (ref 0–0.5)
EOSINOPHIL NFR BLD: 2.6 % (ref 0–8)
ERYTHROCYTE [DISTWIDTH] IN BLOOD BY AUTOMATED COUNT: 18.2 % (ref 11.5–14.5)
EST. GFR  (NO RACE VARIABLE): 20.9 ML/MIN/1.73 M^2
GLUCOSE SERPL-MCNC: 98 MG/DL (ref 70–110)
HCT VFR BLD AUTO: 28.2 % (ref 37–48.5)
HGB BLD-MCNC: 9.2 G/DL (ref 12–16)
IMM GRANULOCYTES # BLD AUTO: 0.01 K/UL (ref 0–0.04)
IMM GRANULOCYTES NFR BLD AUTO: 0.3 % (ref 0–0.5)
LYMPHOCYTES # BLD AUTO: 0.6 K/UL (ref 1–4.8)
LYMPHOCYTES NFR BLD: 17.2 % (ref 18–48)
MCH RBC QN AUTO: 33.2 PG (ref 27–31)
MCHC RBC AUTO-ENTMCNC: 32.6 G/DL (ref 32–36)
MCV RBC AUTO: 102 FL (ref 82–98)
MONOCYTES # BLD AUTO: 0.6 K/UL (ref 0.3–1)
MONOCYTES NFR BLD: 16 % (ref 4–15)
NEUTROPHILS # BLD AUTO: 2.1 K/UL (ref 1.8–7.7)
NEUTROPHILS NFR BLD: 62.4 % (ref 38–73)
NRBC BLD-RTO: 0 /100 WBC
PLATELET # BLD AUTO: 197 K/UL (ref 150–450)
PMV BLD AUTO: 10.8 FL (ref 9.2–12.9)
POCT GLUCOSE: 112 MG/DL (ref 70–110)
POCT GLUCOSE: 145 MG/DL (ref 70–110)
POCT GLUCOSE: 148 MG/DL (ref 70–110)
POCT GLUCOSE: 166 MG/DL (ref 70–110)
POTASSIUM SERPL-SCNC: 4 MMOL/L (ref 3.5–5.1)
RBC # BLD AUTO: 2.77 M/UL (ref 4–5.4)
SODIUM SERPL-SCNC: 131 MMOL/L (ref 136–145)
WBC # BLD AUTO: 3.43 K/UL (ref 3.9–12.7)

## 2024-07-03 PROCEDURE — 36415 COLL VENOUS BLD VENIPUNCTURE: CPT | Mod: HCNC | Performed by: INTERNAL MEDICINE

## 2024-07-03 PROCEDURE — 85025 COMPLETE CBC W/AUTO DIFF WBC: CPT | Mod: HCNC | Performed by: INTERNAL MEDICINE

## 2024-07-03 PROCEDURE — 80048 BASIC METABOLIC PNL TOTAL CA: CPT | Mod: HCNC | Performed by: INTERNAL MEDICINE

## 2024-07-03 PROCEDURE — 25000003 PHARM REV CODE 250: Mod: HCNC | Performed by: INTERNAL MEDICINE

## 2024-07-03 PROCEDURE — 25000003 PHARM REV CODE 250: Mod: HCNC | Performed by: NURSE PRACTITIONER

## 2024-07-03 PROCEDURE — 63600175 PHARM REV CODE 636 W HCPCS: Mod: JZ,HCNC

## 2024-07-03 PROCEDURE — 11000001 HC ACUTE MED/SURG PRIVATE ROOM: Mod: HCNC

## 2024-07-03 PROCEDURE — 25000003 PHARM REV CODE 250: Mod: HCNC

## 2024-07-03 PROCEDURE — 80100014 HC HEMODIALYSIS 1:1: Mod: HCNC

## 2024-07-03 PROCEDURE — 99232 SBSQ HOSP IP/OBS MODERATE 35: CPT | Mod: HCNC,,, | Performed by: NURSE PRACTITIONER

## 2024-07-03 PROCEDURE — 63600175 PHARM REV CODE 636 W HCPCS: Mod: HCNC

## 2024-07-03 RX ORDER — PREGABALIN 50 MG/1
50 CAPSULE ORAL 2 TIMES DAILY
Status: DISCONTINUED | OUTPATIENT
Start: 2024-07-03 | End: 2024-07-05 | Stop reason: HOSPADM

## 2024-07-03 RX ORDER — PREGABALIN 75 MG/1
75 CAPSULE ORAL 2 TIMES DAILY
Status: DISCONTINUED | OUTPATIENT
Start: 2024-07-03 | End: 2024-07-03

## 2024-07-03 RX ADMIN — INSULIN ASPART 3 UNITS: 100 INJECTION, SOLUTION INTRAVENOUS; SUBCUTANEOUS at 12:07

## 2024-07-03 RX ADMIN — HEPARIN SODIUM 1000 UNITS: 1000 INJECTION, SOLUTION INTRAVENOUS; SUBCUTANEOUS at 03:07

## 2024-07-03 RX ADMIN — CLOPIDOGREL BISULFATE 75 MG: 75 TABLET ORAL at 09:07

## 2024-07-03 RX ADMIN — PREGABALIN 50 MG: 50 CAPSULE ORAL at 08:07

## 2024-07-03 RX ADMIN — ATORVASTATIN CALCIUM 80 MG: 40 TABLET, FILM COATED ORAL at 09:07

## 2024-07-03 RX ADMIN — GABAPENTIN 100 MG: 100 CAPSULE ORAL at 09:07

## 2024-07-03 RX ADMIN — APIXABAN 5 MG: 5 TABLET, FILM COATED ORAL at 08:07

## 2024-07-03 RX ADMIN — ACETAMINOPHEN 1000 MG: 500 TABLET ORAL at 05:07

## 2024-07-03 RX ADMIN — CARVEDILOL 6.25 MG: 6.25 TABLET, FILM COATED ORAL at 09:07

## 2024-07-03 RX ADMIN — ERYTHROPOIETIN 4000 UNITS: 4000 INJECTION, SOLUTION INTRAVENOUS; SUBCUTANEOUS at 03:07

## 2024-07-03 RX ADMIN — SERTRALINE 100 MG: 100 TABLET, FILM COATED ORAL at 09:07

## 2024-07-03 RX ADMIN — PREGABALIN 50 MG: 50 CAPSULE ORAL at 12:07

## 2024-07-03 RX ADMIN — HYDRALAZINE HYDROCHLORIDE 50 MG: 50 TABLET ORAL at 08:07

## 2024-07-03 RX ADMIN — FAMOTIDINE 20 MG: 20 TABLET ORAL at 09:07

## 2024-07-03 RX ADMIN — CALCIUM ACETATE 667 MG: 667 CAPSULE ORAL at 12:07

## 2024-07-03 RX ADMIN — CALCIUM ACETATE 667 MG: 667 CAPSULE ORAL at 04:07

## 2024-07-03 RX ADMIN — INSULIN ASPART 3 UNITS: 100 INJECTION, SOLUTION INTRAVENOUS; SUBCUTANEOUS at 09:07

## 2024-07-03 RX ADMIN — CALCITRIOL 0.5 MCG: 0.5 CAPSULE, LIQUID FILLED ORAL at 09:07

## 2024-07-03 RX ADMIN — TRAZODONE HYDROCHLORIDE 25 MG: 50 TABLET ORAL at 08:07

## 2024-07-03 RX ADMIN — CARVEDILOL 6.25 MG: 6.25 TABLET, FILM COATED ORAL at 08:07

## 2024-07-03 RX ADMIN — FUROSEMIDE 40 MG: 40 TABLET ORAL at 09:07

## 2024-07-03 RX ADMIN — HYDRALAZINE HYDROCHLORIDE 50 MG: 50 TABLET ORAL at 09:07

## 2024-07-03 RX ADMIN — HYDRALAZINE HYDROCHLORIDE 50 MG: 50 TABLET ORAL at 02:07

## 2024-07-03 RX ADMIN — INSULIN ASPART 3 UNITS: 100 INJECTION, SOLUTION INTRAVENOUS; SUBCUTANEOUS at 04:07

## 2024-07-03 RX ADMIN — APIXABAN 5 MG: 5 TABLET, FILM COATED ORAL at 09:07

## 2024-07-03 RX ADMIN — CALCIUM ACETATE 667 MG: 667 CAPSULE ORAL at 07:07

## 2024-07-03 NOTE — PLAN OF CARE
Suyapa Connelly has a mobility limitation that significantly impairs her ability to participate in one or more mobility related activities of daily living (MRADL's) such as toileting, feeding, dressing, grooming, and bathing in customary locations in the home. The mobility limitation cannot be sufficiently resolved by the use of a cane or walker. The use of a manual wheelchair will significantly improve the patient's ability to participate in MRADLS and the patient will use it on regular basis in the home. Suyapa Connelly has expressed her willingness to use a manual wheelchair in the home. She also has a caregiver who is available, willing, and able to provide assistance with the wheelchair when needed.

## 2024-07-03 NOTE — PROGRESS NOTES
07/03/24 0045   Vital Signs   Temp 98.6 °F (37 °C)   Temp Source Oral   Pulse 73   Heart Rate Source Monitor   Resp 16   Device (Oxygen Therapy) room air   /67   MAP (mmHg) 93   BP Location Right arm   BP Method Automatic   Patient Position Lying     Bedside HD 1:1 tx initiated aseptically via [facility].  Pt is stable for tx.

## 2024-07-03 NOTE — PROGRESS NOTES
07/03/24 0345   Vital Signs   Temp 98.1 °F (36.7 °C)   Temp Source Oral   Pulse 69   Heart Rate Source Monitor   Resp 16   Device (Oxygen Therapy) room air   /62   MAP (mmHg) 89   BP Location Right arm   BP Method Automatic   Patient Position Lying     HD tx completed and pt tolerated well.  Tx time: 3hrs.  Net UF: 3L.  Pt is alert and stable. VSS.  Report given to the primary RN.

## 2024-07-03 NOTE — ASSESSMENT & PLAN NOTE
58 y.o. Black or  Female ESRD-HD M-W-F presents to ED on 6/21/2024 with diagnosis of: Hypoglycemia [E16.2];Encephalopathy [G93.40];Chest pain [R07.9];Urinary tract infection with hematuria, site unspecified [N39.0, R31.9]   Nephrology consulted for inpatient ESRD-HD management    Outpatient HD Information:  -Dialysis modality: Hemodialysis  -Outpatient HD unit: [facility]  -Nephrologist: ?  -HD TX days: Monday/Wednesday/Friday, duration of treatment: 3 hrs   -Last HD TX prior to hospital admission: 6/21/24  -Dialysis access: dialysis catheter   -Residual urine: Minium  -EDW: 67 kg     Assessment:   - Dialysis for metabolic clearance and volume management completed overnight with net UF of 3 L, next HD 7/5  - Recommend holding blood pressure medicines prior to HD so patient may better tolerate HD with fluid removal   - Electrolytes stable today  - Recommend renal diet, 1 L fluid restriction  - Will continue iHD thrice weekly unless emergent conditions arise.    Anemia of ESRD   Recent Labs   Lab 06/30/24  0657 07/03/24  0513   WBC 4.24 3.43*   HGB 10.0* 9.2*   HCT 32.3* 28.2*    197       Lab Results   Component Value Date    FESATURATED 35 06/22/2024    FERRITIN 757 (H) 06/22/2024       - Goal in ESRD is Hgb of 10-11. Near target.   - continue EPO    Mineral Bone Disease in ESRD   Lab Results   Component Value Date    PTH 1,132 (H) 06/17/2024    CALCIUM 8.5 (L) 07/03/2024    ALBUMIN 2.8 (L) 07/01/2024    CAION 0.92 (L) 05/17/2023    PHOS 3.9 07/01/2024     -continue phos binders, phos with next labs

## 2024-07-03 NOTE — PLAN OF CARE

## 2024-07-03 NOTE — PLAN OF CARE
Suyapa Connelly requires a hospital bed for positioning of the body in ways that are not feasible with an ordinary bed. The patient requires special positioning for pain relief, limited mobility, and/or being unable to independently make changes in body position without the use of a hospital bed. Pillows and wedges will not be adequate for resolving these positional issues.

## 2024-07-03 NOTE — SUBJECTIVE & OBJECTIVE
Interval History: NAEON. HD completed overnight with net UF 3L, tolerated well.     Review of patient's allergies indicates:   Allergen Reactions    Ciprofloxacin Itching    Iodine      Kidney injury    Pcn [penicillins]      Rash; tolerated ceftriaxone on 1/13/20     Current Facility-Administered Medications   Medication Frequency    acetaminophen tablet 1,000 mg Q8H PRN    acetaminophen tablet 650 mg Q6H PRN    allopurinol split tablet 50 mg Every other day    apixaban tablet 5 mg BID    atorvastatin tablet 80 mg Daily    barium sulfate (READI-CAT) suspension 450 mL ONCE PRN    calcitRIOL capsule 0.5 mcg Daily    calcium acetate(phosphat bind) capsule 667 mg TID WM    carvediloL tablet 6.25 mg BID    clopidogreL tablet 75 mg Daily    dextrose 10% bolus 125 mL 125 mL PRN    dextrose 10% bolus 250 mL 250 mL PRN    diphenhydrAMINE capsule 25 mg Q6H PRN    diphenhydrAMINE injection 50 mg On Call Procedure    epoetin nina injection 4,000 Units Every Mon, Wed, Fri    famotidine tablet 20 mg Daily    furosemide tablet 40 mg Daily    glucagon (human recombinant) injection 1 mg PRN    glucose chewable tablet 16 g PRN    glucose chewable tablet 24 g PRN    heparin (porcine) injection 1,000 Units PRN    hydrALAZINE tablet 50 mg TID    hydrocortisone 2.5 % cream BID PRN    hydrOXYzine HCL tablet 25 mg TID PRN    insulin aspart U-100 pen 0-5 Units QID (AC + HS) PRN    insulin aspart U-100 pen 3 Units TIDWM    melatonin tablet 6 mg Nightly PRN    naloxone 0.4 mg/mL injection 0.02 mg PRN    pregabalin capsule 50 mg BID    prochlorperazine injection Soln 5 mg Q6H PRN    sertraline tablet 100 mg Daily    sodium chloride 0.9% flush 10 mL Q12H PRN    trazodone split tablet 25 mg QHS       Objective:     Vital Signs (Most Recent):  Temp: 97.2 °F (36.2 °C) (07/03/24 0750)  Pulse: 72 (07/03/24 0750)  Resp: 18 (07/03/24 0750)  BP: 134/66 (07/03/24 0750)  SpO2: 97 % (07/03/24 0750) Vital Signs (24h Range):  Temp:  [97.2 °F (36.2 °C)-98.6  °F (37 °C)] 97.2 °F (36.2 °C)  Pulse:  [65-76] 72  Resp:  [16-18] 18  SpO2:  [95 %-100 %] 97 %  BP: (112-139)/(52-67) 134/66     Weight: 80.5 kg (177 lb 7.5 oz) (07/01/24 0400)  Body mass index is 54.16 kg/m².  Body surface area is 1.65 meters squared.    I/O last 3 completed shifts:  In: 740 [P.O.:240; Other:500]  Out: 3500 [Other:3500]     Physical Exam  Vitals reviewed.   HENT:      Head: Normocephalic and atraumatic.   Eyes:      Conjunctiva/sclera: Conjunctivae normal.   Cardiovascular:      Rate and Rhythm: Normal rate.   Pulmonary:      Effort: Pulmonary effort is normal. No respiratory distress.   Musculoskeletal:      Comments: Bilateral AKA. Mild LE edema.    Skin:     General: Skin is warm and dry.   Neurological:      Mental Status: Mental status is at baseline.   Psychiatric:         Mood and Affect: Mood normal.          Significant Labs:  CBC:   Recent Labs   Lab 07/03/24  0513   WBC 3.43*   RBC 2.77*   HGB 9.2*   HCT 28.2*      *   MCH 33.2*   MCHC 32.6     CMP:   Recent Labs   Lab 07/01/24  1428 07/03/24  0513   * 98   CALCIUM 8.6* 8.5*   ALBUMIN 2.8*  --    * 131*   K 4.8 4.0   CO2 22* 20*   CL 98 103   BUN 51* 24*   CREATININE 4.5* 2.6*     All labs within the past 24 hours have been reviewed.

## 2024-07-03 NOTE — PROGRESS NOTES
[facility] [address] [place] [address] Medicine  Progress Note    Patient Name: Suyapa Connelly  MRN: [id number]  Patient Class: IP- Inpatient   Admission Date: 6/21/2024  Length of Stay: 11 days  Attending Physician: Rodríguez Pratt MD  Primary Care Provider: Feliciano Nguyen III, PA-C        Subjective:     Principal Problem:Acute encephalopathy        HPI:  Suyapa Connelly is a 58 y.o. female with a PMHx of HTN, GERD, HLD, PVD, ESRD on HD MFW, b/l AKAs, CAD s/p CABG, afib on eliquis, depression, and anxiety who presents to the ED for evaluation of confusion. HPI and ROS limited secondary to patient's mental status and poor participation. Per patient's  she has been more sleepy and less interactive over the past several days. He reports that her urine has been foul-smelling and more cloudy. He states she was also peeing more frequently in complaining of burning with urination and some occasional suprapubic pain. Denies any fevers or chills. The patient denies CP, SOB, abdominal pain, or dysuria. Per  patient was dialyzed today a full dialysis and they came straight here after.     In the ED, VSS, afebrile. CBC with stable leukopenia and anemia. Na 129, Chloride 94, Bicarb 22, Glucose 50. POC glucose 150. Cr 2.7 (consistent with ESRD). Albumin 3.2. Tbili 1.1. Mag 2.1. UA with 3+ leukocytosis, 74 WBCs, but no bacteria. CTH with no acute finding or detrimental change when compared with 06/11/2024, allowing for motion and artifact limitations. The patient received 1g IV Rocephin.    Overview/Hospital Course:  Suyapa Connelly is a 58 y.o. F who was admitted to  for further evaluation of acute encephalopathy. Most likely 2/2 UTI, other workup negative. AAOx3 now. Initiated rocephin for infectious appearing UA. Following culture - grew Candida krusei, stopped rocephin. ID consulted for recommendations. Recommend deferring treatment, obtain CT abd/pelvis. CT with findings of volume overload, otherwise  no acute intraabdominal abnormality. Given x1 120mg IV lasix. Repeating UA with straight cath and following cx per ID recs. Nephrology following for HD management: HD performed 6/24 and 6/26. Psych consulted for recommendations: continue zoloft, add trazodone qhs. CM assisting with discharge planning - NH declined admission.  DC planning home commenced with HH, DME, and close case management oversight.       Interval History:     NAEON.  HD today.  VSS, labs stable.  Arranging DME for discharge.  See medical necessity items below.    Review of Systems   Constitutional:  Positive for fatigue. Negative for activity change, appetite change, chills, diaphoresis and fever.   HENT:  Negative for congestion, facial swelling, hearing loss, nosebleeds, sinus pressure, sinus pain, sneezing, sore throat, tinnitus, trouble swallowing and voice change.    Eyes:  Negative for photophobia, pain, discharge and visual disturbance.   Respiratory:  Negative for cough, chest tightness, shortness of breath and wheezing.    Cardiovascular:  Negative for chest pain, palpitations and leg swelling.   Gastrointestinal:  Negative for abdominal distention, abdominal pain, blood in stool, constipation, diarrhea, nausea and vomiting.   Endocrine: Negative for cold intolerance, heat intolerance and polyuria.   Genitourinary:  Negative for decreased urine volume, difficulty urinating, dysuria, flank pain, frequency and urgency.   Musculoskeletal:  Negative for arthralgias, gait problem, myalgias, neck pain and neck stiffness.   Skin:  Negative for rash and wound.   Neurological:  Positive for weakness. Negative for dizziness, speech difficulty, light-headedness and headaches.   Hematological:  Negative for adenopathy. Does not bruise/bleed easily.   Psychiatric/Behavioral:  Negative for agitation, confusion, self-injury, sleep disturbance and suicidal ideas. The patient is not nervous/anxious.      Objective:     Vital Signs (Most Recent):  Temp:  97.2 °F (36.2 °C) (07/03/24 1128)  Pulse: 72 (07/03/24 1128)  Resp: 20 (07/03/24 1128)  BP: 129/65 (07/03/24 1128)  SpO2: 97 % (07/03/24 1128) Vital Signs (24h Range):  Temp:  [97.2 °F (36.2 °C)-98.6 °F (37 °C)] 97.2 °F (36.2 °C)  Pulse:  [67-76] 72  Resp:  [16-20] 20  SpO2:  [95 %-100 %] 97 %  BP: (112-137)/(52-67) 129/65     Weight: 80.5 kg (177 lb 7.5 oz)  Body mass index is 54.16 kg/m².    Intake/Output Summary (Last 24 hours) at 7/3/2024 1304  Last data filed at 7/3/2024 0601  Gross per 24 hour   Intake 740 ml   Output 3500 ml   Net -2760 ml         Physical Exam  Vitals and nursing note reviewed.   Constitutional:       Appearance: She is well-developed.   Eyes:      Pupils: Pupils are equal, round, and reactive to light.   Cardiovascular:      Rate and Rhythm: Normal rate and regular rhythm.   Pulmonary:      Effort: Pulmonary effort is normal.      Breath sounds: Normal breath sounds.   Abdominal:      Palpations: Abdomen is soft.      Tenderness: There is no abdominal tenderness.   Musculoskeletal:         General: No tenderness.      Comments: B/L LE amputations noted   Skin:     General: Skin is warm and dry.   Neurological:      Mental Status: She is alert.      Comments: Oriented to person, place and year   Psychiatric:         Behavior: Behavior normal.             Significant Labs: All pertinent labs within the past 24 hours have been reviewed.    Significant Imaging: I have reviewed all pertinent imaging results/findings within the past 24 hours.    Assessment/Plan:      * Acute encephalopathy  Resolved  Patient has acute metabolic encephalopathy that likely secondary to UTI.Treatment for underlying cause is underway. Pt with recurrent admissions for encephalopathy with extensive work ups including EEG, CTH, MRI brain, and vitamin panels. Will monitor neuro status carefully, avoid narcotics and benzos that will exacerbate agitation, and use PRN anti-psychotics for controls of behavior for self  harm.  -UA concerning for infection- growing candida krusei  -CT abd/pelvis - no acute intraabdominal abnormality  -CXR with diffuse increased interstitial and parenchymal attenuation similar to prior study. No large region of confluent airspace consolidation or substantial volume of pleural fluid identified.   -CTH-negative  - ID consulted  1. Monitor clinically  2. Rec laxatives for constipation  3. Abx and antifungals not recommended  4. Seen with ID staff   -Delirium precautions   - reports progressive decline in patient over the last few months. Initiated NH paperwork for patient. CM notified for assistance, patient is medically ready    Severe obesity (BMI >= 40)  Body mass index is 54.09 kg/m². Morbid obesity complicates all aspects of disease management from diagnostic modalities to treatment.     ESRD (end stage renal disease)  Chronic kidney disease-mineral and bone disorder   MWF schedule, last dialyzed 6/26  Residual renal function?- Yes  - Nephrology consulted  - Continue chronic hemodialysis  - Monitor daily electrolytes and defer dialysis orders to nephrology  - Renally dose medications  - Renal diet  - Continue phosphorus binders  - Daily weights/strict I&Os  - 1.5L FR    Debility  Patient with Chronic debility due to other reduced mobility. Latest AMPAC and GEMS scores have not been reviewed. Plan includes progressive mobility protocol initated and fall precautions in place.  - family initiated NH paperwork  - CM notified    S/P AKA (above knee amputation) bilateral  History noted.  -Fall precautions  -DME needed WC, hospital bed.     Suyapa Connelly requires a hospital bed for positioning of the body in ways that are not feasible with an ordinary bed. The patient requires special positioning for pain relief, limited mobility, and/or being unable to independently make changes in body position without the use of a hospital bed. Pillows and wedges will not be adequate for resolving these  positional issues.     Suyapa Connelly has a mobility limitation that significantly impairs her ability to participate in one or more mobility related activities of daily living (MRADL's) such as toileting, feeding, dressing, grooming, and bathing in customary locations in the home. The mobility limitation cannot be sufficiently resolved by the use of a cane or walker. The use of a manual wheelchair will significantly improve the patient's ability to participate in MRADLS and the patient will use it on regular basis in the home. Suyapa Connelly has expressed her willingness to use a manual wheelchair in the home. She also has a caregiver who is available, willing, and able to provide assistance with the wheelchair when needed.       Chronic combined systolic and diastolic heart failure  Patient is identified as having Combined Systolic and Diastolic heart failure that is Chronic. CHF is currently controlled. Latest ECHO performed and demonstrates- Results for orders placed during the hospital encounter of 05/07/24    Echo    Interpretation Summary    Left Ventricle: The left ventricle is mildly dilated. Ventricular mass is normal. Normal wall thickness. Severe global hypokinesis present. There is severely reduced systolic function. Ejection fraction by visual approximation is 15%. There is diastolic dysfunction.    Right Ventricle: Severe right ventricular enlargement. Wall thickness is normal. Right ventricle wall motion has global hypokinesis. Systolic function is mildly reduced.    Left Atrium: Left atrium is severely dilated. There is an atrial septal aneurysm.    Right Atrium: Right atrium is dilated.    Aortic Valve: There is mild aortic regurgitation.    Mitral Valve: There is mild regurgitation.    Tricuspid Valve: There is annular dilation present. There is normal leaflet mobility. There is severe functional regurgitation.    Pulmonary Artery: The estimated pulmonary artery systolic pressure is at least 24  "mmHg. PASP is likely under-estimated in the setting of severe tricuspid regurgitation.    IVC/SVC: Central venous pressure of at least 8 mmHg.    No vegetation seen.  . Continue Beta Blocker, lasix and monitor clinical status closely. Monitor on telemetry. Patient is off CHF pathway.  Monitor strict Is&Os and daily weights.  Place on fluid restriction of 1.5 L. Continue to stress to patient importance of self efficacy and  on diet for CHF. Last BNP reviewed- and noted below No results for input(s): "BNP", "BNPTRIAGEBLO" in the last 168 hours..  -Volume management with HD.      Hyponatremia  Patient has hyponatremia which is uncontrolled,We will aim to correct the sodium by 4-6mEq in 24 hours. We will monitor sodium Daily. The hyponatremia is due to Medications: SSRIs and renal insufficiency. We will obtain the following studies: Urine sodium, urine osmolality, serum osmolality. We will treat the hyponatremia with Hemodialysis and Removal of offending medications. The patient's sodium results have been reviewed and are listed below.  No results for input(s): "NA" in the last 24 hours.  - patient with chronic hyponatremia most likely 2/2 ESRD    Paroxysmal atrial fibrillation  Patient with Paroxysmal (<7 days) atrial fibrillation which is controlled currently with Beta Blocker. Patient is currently in sinus rhythm.[id number] Score: 3. Anticoagulation indicated. Anticoagulation done with eliquis .    Anemia due to chronic kidney disease, on chronic dialysis  Patient's anemia is currently controlled. Has not received any PRBCs to date. Etiology likely d/t chronic disease due to ESRD  Current CBC reviewed-   Lab Results   Component Value Date    HGB 9.7 (L) 06/26/2024    HCT 30.4 (L) 06/26/2024     Monitor serial CBC and transfuse if patient becomes hemodynamically unstable, symptomatic or H/H drops below 7/21.    CAD (coronary artery disease)  Patient with known CAD s/p CABG, which is controlled Will continue " Plavix and Statin and monitor for S/Sx of angina/ACS. Continue to monitor on telemetry.     Type 2 diabetes mellitus with hyperglycemia, with long-term current use of insulin  Patient's FSGs are not controlled on current hypoglycemics due to hypoglycemia.   Last A1c reviewed-   Lab Results   Component Value Date    HGBA1C 8.0 (H) 04/24/2024     Most recent fingerstick glucose reviewed-   Recent Labs   Lab 06/26/24  1629 06/26/24  2154 06/27/24  0800 06/27/24  1119   POCTGLUCOSE 164* 252* 170* 147*       Current correctional scale  Low  Maintain anti-hyperglycemic dose as follows-   Antihyperglycemics (From admission, onward)      Start     Stop Route Frequency Ordered    06/24/24 1645  insulin aspart U-100 pen 3 Units         -- SubQ 3 times daily with meals 06/24/24 1335    06/22/24 0032  insulin aspart U-100 pen 0-5 Units         -- SubQ Before meals & nightly PRN 06/21/24 2332        -Pt initially hypoglycemic which improved with D10 bolus.   -Accuchecks AC/HS    Acute cystitis without hematuria  -Pt denies any abd pain, dysuria, urinary frequency, or fever/chills. Per  patient's urine has been foul smelling and cloudy. Per  patient has also been complaining of suprapubic pain and dysuria at home.  -Afebrile, WBC 3.80 (chronic leukopenia).  -UA reviewed, follow urine cx.  -Pt received IV rocephin in the ED, continue for now.  -culture with candida krusei  - ID consulted for recommendations - no tx indicated        CAROLIN (generalized anxiety disorder)  -Chronic, uncontrolled.  -Hold zoloft for now pending hyponatremia work up.   -Can consider psychiatry consult - consult placed:   - recommend continuing zoloft, trazadone 25mg qhs    Peripheral vascular disease  -Chronic issue.  -Continue plavix and statin.      VTE Risk Mitigation (From admission, onward)           Ordered     heparin (porcine) injection 1,000 Units  As needed (PRN)         06/24/24 1001     apixaban tablet 5 mg  2 times daily          06/21/24 2332     IP VTE HIGH RISK PATIENT  Once         06/21/24 2332     Reason for No Pharmacological VTE Prophylaxis  Once        Question:  Reasons:  Answer:  Already adequately anticoagulated on oral Anticoagulants    06/21/24 2332                    Discharge Planning   DEVAN: 7/3/2024     Code Status: Full Code   Is the patient medically ready for discharge?:     Reason for patient still in hospital (select all that apply): Pending disposition  Discharge Plan A: New Nursing Home placement - longterm care facility                  Rodríguez Pratt MD  Department of Hospital Medicine   [facility] - Observation 11H

## 2024-07-03 NOTE — PROGRESS NOTES
[facility]  Nephrology  Progress Note    Patient Name: Suyapa Connelly  MRN: [id number]  Admission Date: 6/21/2024  Hospital Length of Stay: 11 days  Attending Provider: Rodríguez Pratt MD   Primary Care Physician: Feliciano Nguyen III PASahilC  Principal Problem:Acute encephalopathy    Subjective:     Interval History: NAEON. HD completed overnight with net UF 3L, tolerated well.     Review of patient's allergies indicates:   Allergen Reactions    Ciprofloxacin Itching    Iodine      Kidney injury    Pcn [penicillins]      Rash; tolerated ceftriaxone on 1/13/20     Current Facility-Administered Medications   Medication Frequency    acetaminophen tablet 1,000 mg Q8H PRN    acetaminophen tablet 650 mg Q6H PRN    allopurinol split tablet 50 mg Every other day    apixaban tablet 5 mg BID    atorvastatin tablet 80 mg Daily    barium sulfate (READI-CAT) suspension 450 mL ONCE PRN    calcitRIOL capsule 0.5 mcg Daily    calcium acetate(phosphat bind) capsule 667 mg TID WM    carvediloL tablet 6.25 mg BID    clopidogreL tablet 75 mg Daily    dextrose 10% bolus 125 mL 125 mL PRN    dextrose 10% bolus 250 mL 250 mL PRN    diphenhydrAMINE capsule 25 mg Q6H PRN    diphenhydrAMINE injection 50 mg On Call Procedure    epoetin nina injection 4,000 Units Every Mon, Wed, Fri    famotidine tablet 20 mg Daily    furosemide tablet 40 mg Daily    glucagon (human recombinant) injection 1 mg PRN    glucose chewable tablet 16 g PRN    glucose chewable tablet 24 g PRN    heparin (porcine) injection 1,000 Units PRN    hydrALAZINE tablet 50 mg TID    hydrocortisone 2.5 % cream BID PRN    hydrOXYzine HCL tablet 25 mg TID PRN    insulin aspart U-100 pen 0-5 Units QID (AC + HS) PRN    insulin aspart U-100 pen 3 Units TIDWM    melatonin tablet 6 mg Nightly PRN    naloxone 0.4 mg/mL injection 0.02 mg PRN    pregabalin capsule 50 mg BID    prochlorperazine injection Soln 5 mg Q6H PRN    sertraline tablet 100 mg Daily    sodium  chloride 0.9% flush 10 mL Q12H PRN    trazodone split tablet 25 mg QHS       Objective:     Vital Signs (Most Recent):  Temp: 97.2 °F (36.2 °C) (07/03/24 0750)  Pulse: 72 (07/03/24 0750)  Resp: 18 (07/03/24 0750)  BP: 134/66 (07/03/24 0750)  SpO2: 97 % (07/03/24 0750) Vital Signs (24h Range):  Temp:  [97.2 °F (36.2 °C)-98.6 °F (37 °C)] 97.2 °F (36.2 °C)  Pulse:  [65-76] 72  Resp:  [16-18] 18  SpO2:  [95 %-100 %] 97 %  BP: (112-139)/(52-67) 134/66     Weight: 80.5 kg (177 lb 7.5 oz) (07/01/24 0400)  Body mass index is 54.16 kg/m².  Body surface area is 1.65 meters squared.    I/O last 3 completed shifts:  In: 740 [P.O.:240; Other:500]  Out: 3500 [Other:3500]     Physical Exam  Vitals reviewed.   HENT:      Head: Normocephalic and atraumatic.   Eyes:      Conjunctiva/sclera: Conjunctivae normal.   Cardiovascular:      Rate and Rhythm: Normal rate.   Pulmonary:      Effort: Pulmonary effort is normal. No respiratory distress.   Musculoskeletal:      Comments: Bilateral AKA. Mild LE edema.    Skin:     General: Skin is warm and dry.   Neurological:      Mental Status: Mental status is at baseline.   Psychiatric:         Mood and Affect: Mood normal.          Significant Labs:  CBC:   Recent Labs   Lab 07/03/24  0513   WBC 3.43*   RBC 2.77*   HGB 9.2*   HCT 28.2*      *   MCH 33.2*   MCHC 32.6     CMP:   Recent Labs   Lab 07/01/24  1428 07/03/24  0513   * 98   CALCIUM 8.6* 8.5*   ALBUMIN 2.8*  --    * 131*   K 4.8 4.0   CO2 22* 20*   CL 98 103   BUN 51* 24*   CREATININE 4.5* 2.6*     All labs within the past 24 hours have been reviewed.     Assessment/Plan:     Neuro  * Acute encephalopathy  -per primary      Renal/  ESRD (end stage renal disease)  58 y.o. Black or  Female ESRD-HD M-W-F presents to ED on 6/21/2024 with diagnosis of: Hypoglycemia [E16.2];Encephalopathy [G93.40];Chest pain [R07.9];Urinary tract infection with hematuria, site unspecified [N39.0, R31.9]    Nephrology consulted for inpatient ESRD-HD management    Outpatient HD Information:  -Dialysis modality: Hemodialysis  -Outpatient HD unit: [facility] [place] [place]  -Nephrologist: ?  -HD TX days: Monday/Wednesday/Friday, duration of treatment: 3 hrs   -Last HD TX prior to hospital admission: 6/21/24  -Dialysis access: dialysis catheter   -Residual urine: Minium  -EDW: 67 kg     Assessment:   - Dialysis for metabolic clearance and volume management completed overnight with net UF of 3 L, next HD 7/5  - Recommend holding blood pressure medicines prior to HD so patient may better tolerate HD with fluid removal   - Electrolytes stable today  - Recommend renal diet, 1 L fluid restriction  - Will continue iHD thrice weekly unless emergent conditions arise.    Anemia of ESRD   Recent Labs   Lab 06/30/24  0657 07/03/24  0513   WBC 4.24 3.43*   HGB 10.0* 9.2*   HCT 32.3* 28.2*    197       Lab Results   Component Value Date    FESATURATED 35 06/22/2024    FERRITIN 757 (H) 06/22/2024       - Goal in ESRD is Hgb of 10-11. Near target.   - continue EPO    Mineral Bone Disease in ESRD   Lab Results   Component Value Date    PTH 1,132 (H) 06/17/2024    CALCIUM 8.5 (L) 07/03/2024    ALBUMIN 2.8 (L) 07/01/2024    CAION 0.92 (L) 05/17/2023    PHOS 3.9 07/01/2024     -continue phos binders, phos with next labs            Thank you for your consult. I will follow-up with patient. Please contact us if you have any additional questions.    Katie Monique DNP  Nephrology  [facility]

## 2024-07-03 NOTE — PLAN OF CARE
Problem: Infection  Goal: Absence of Infection Signs and Symptoms  Outcome: Progressing     Problem: Adult Inpatient Plan of Care  Goal: Plan of Care Review  Outcome: Progressing  Goal: Patient-Specific Goal (Individualized)  Outcome: Progressing  Goal: Absence of Hospital-Acquired Illness or Injury  Outcome: Progressing  Goal: Optimal Comfort and Wellbeing  Outcome: Progressing  Goal: Readiness for Transition of Care  Outcome: Progressing     Problem: Bariatric Environmental Safety  Goal: Safety Maintained with Care  Outcome: Progressing     Problem: Skin Injury Risk Increased  Goal: Skin Health and Integrity  Outcome: Progressing     Problem: Diabetes Comorbidity  Goal: Blood Glucose Level Within Targeted Range  Outcome: Progressing     Problem: Sepsis/Septic Shock  Goal: Optimal Coping  Outcome: Progressing  Goal: Absence of Bleeding  Outcome: Progressing  Goal: Blood Glucose Level Within Targeted Range  Outcome: Progressing  Goal: Absence of Infection Signs and Symptoms  Outcome: Progressing  Goal: Optimal Nutrition Intake  Outcome: Progressing     Problem: Wound  Goal: Optimal Coping  Outcome: Progressing  Goal: Optimal Functional Ability  Outcome: Progressing  Goal: Absence of Infection Signs and Symptoms  Outcome: Progressing  Goal: Improved Oral Intake  Outcome: Progressing  Goal: Optimal Pain Control and Function  Outcome: Progressing  Goal: Skin Health and Integrity  Outcome: Progressing  Goal: Optimal Wound Healing  Outcome: Progressing     Problem: Fall Injury Risk  Goal: Absence of Fall and Fall-Related Injury  Outcome: Progressing     Problem: Pain Acute  Goal: Optimal Pain Control and Function  Outcome: Progressing     Problem: Hemodialysis  Goal: Safe, Effective Therapy Delivery  Outcome: Progressing  Goal: Effective Tissue Perfusion  Outcome: Progressing  Goal: Absence of Infection Signs and Symptoms  Outcome: Progressing     Problem: Chronic Kidney Disease  Goal: Electrolyte Balance  Outcome:  Progressing  Goal: Fluid Balance  Outcome: Progressing     Problem: Comorbidity Management  Goal: Maintenance of Heart Failure Symptom Control  Outcome: Progressing  Goal: Blood Pressure in Desired Range  Outcome: Progressing     Problem: Anemia  Goal: Anemia Symptom Improvement  Outcome: Progressing

## 2024-07-03 NOTE — SUBJECTIVE & OBJECTIVE
Interval History:     NAEON.  HD today.  VSS, labs stable.  Arranging DME for discharge.  See medical necessity items below.    Review of Systems   Constitutional:  Positive for fatigue. Negative for activity change, appetite change, chills, diaphoresis and fever.   HENT:  Negative for congestion, facial swelling, hearing loss, nosebleeds, sinus pressure, sinus pain, sneezing, sore throat, tinnitus, trouble swallowing and voice change.    Eyes:  Negative for photophobia, pain, discharge and visual disturbance.   Respiratory:  Negative for cough, chest tightness, shortness of breath and wheezing.    Cardiovascular:  Negative for chest pain, palpitations and leg swelling.   Gastrointestinal:  Negative for abdominal distention, abdominal pain, blood in stool, constipation, diarrhea, nausea and vomiting.   Endocrine: Negative for cold intolerance, heat intolerance and polyuria.   Genitourinary:  Negative for decreased urine volume, difficulty urinating, dysuria, flank pain, frequency and urgency.   Musculoskeletal:  Negative for arthralgias, gait problem, myalgias, neck pain and neck stiffness.   Skin:  Negative for rash and wound.   Neurological:  Positive for weakness. Negative for dizziness, speech difficulty, light-headedness and headaches.   Hematological:  Negative for adenopathy. Does not bruise/bleed easily.   Psychiatric/Behavioral:  Negative for agitation, confusion, self-injury, sleep disturbance and suicidal ideas. The patient is not nervous/anxious.      Objective:     Vital Signs (Most Recent):  Temp: 97.2 °F (36.2 °C) (07/03/24 1128)  Pulse: 72 (07/03/24 1128)  Resp: 20 (07/03/24 1128)  BP: 129/65 (07/03/24 1128)  SpO2: 97 % (07/03/24 1128) Vital Signs (24h Range):  Temp:  [97.2 °F (36.2 °C)-98.6 °F (37 °C)] 97.2 °F (36.2 °C)  Pulse:  [67-76] 72  Resp:  [16-20] 20  SpO2:  [95 %-100 %] 97 %  BP: (112-137)/(52-67) 129/65     Weight: 80.5 kg (177 lb 7.5 oz)  Body mass index is 54.16 kg/m².    Intake/Output  Summary (Last 24 hours) at 7/3/2024 1304  Last data filed at 7/3/2024 0601  Gross per 24 hour   Intake 740 ml   Output 3500 ml   Net -2760 ml         Physical Exam  Vitals and nursing note reviewed.   Constitutional:       Appearance: She is well-developed.   Eyes:      Pupils: Pupils are equal, round, and reactive to light.   Cardiovascular:      Rate and Rhythm: Normal rate and regular rhythm.   Pulmonary:      Effort: Pulmonary effort is normal.      Breath sounds: Normal breath sounds.   Abdominal:      Palpations: Abdomen is soft.      Tenderness: There is no abdominal tenderness.   Musculoskeletal:         General: No tenderness.      Comments: B/L LE amputations noted   Skin:     General: Skin is warm and dry.   Neurological:      Mental Status: She is alert.      Comments: Oriented to person, place and year   Psychiatric:         Behavior: Behavior normal.             Significant Labs: All pertinent labs within the past 24 hours have been reviewed.    Significant Imaging: I have reviewed all pertinent imaging results/findings within the past 24 hours.

## 2024-07-03 NOTE — ASSESSMENT & PLAN NOTE
History noted.  -Fall precautions  -DME needed WC, hospital bed.     Suyapa Connelly requires a hospital bed for positioning of the body in ways that are not feasible with an ordinary bed. The patient requires special positioning for pain relief, limited mobility, and/or being unable to independently make changes in body position without the use of a hospital bed. Pillows and wedges will not be adequate for resolving these positional issues.     Suyapa Connelly has a mobility limitation that significantly impairs her ability to participate in one or more mobility related activities of daily living (MRADL's) such as toileting, feeding, dressing, grooming, and bathing in customary locations in the home. The mobility limitation cannot be sufficiently resolved by the use of a cane or walker. The use of a manual wheelchair will significantly improve the patient's ability to participate in MRADLS and the patient will use it on regular basis in the home. Suyapa Connelly has expressed her willingness to use a manual wheelchair in the home. She also has a caregiver who is available, willing, and able to provide assistance with the wheelchair when needed.

## 2024-07-04 LAB
POCT GLUCOSE: 118 MG/DL (ref 70–110)
POCT GLUCOSE: 136 MG/DL (ref 70–110)
POCT GLUCOSE: 152 MG/DL (ref 70–110)
POCT GLUCOSE: 157 MG/DL (ref 70–110)

## 2024-07-04 PROCEDURE — 11000001 HC ACUTE MED/SURG PRIVATE ROOM: Mod: HCNC

## 2024-07-04 PROCEDURE — 25000003 PHARM REV CODE 250: Mod: HCNC | Performed by: NURSE PRACTITIONER

## 2024-07-04 PROCEDURE — 25000003 PHARM REV CODE 250: Mod: HCNC

## 2024-07-04 PROCEDURE — 25000003 PHARM REV CODE 250: Mod: HCNC | Performed by: INTERNAL MEDICINE

## 2024-07-04 RX ORDER — PEN NEEDLE, DIABETIC 30 GX3/16"
1 NEEDLE, DISPOSABLE MISCELLANEOUS
Qty: 100 EACH | Refills: 3 | Status: SHIPPED | OUTPATIENT
Start: 2024-07-04

## 2024-07-04 RX ORDER — INSULIN ASPART 100 [IU]/ML
3 INJECTION, SOLUTION INTRAVENOUS; SUBCUTANEOUS
Qty: 3 ML | Refills: 10 | Status: SHIPPED | OUTPATIENT
Start: 2024-07-04 | End: 2025-07-04

## 2024-07-04 RX ORDER — CALCITRIOL 0.5 UG/1
0.5 CAPSULE ORAL DAILY
Qty: 30 CAPSULE | Refills: 11 | Status: SHIPPED | OUTPATIENT
Start: 2024-07-04 | End: 2025-07-04

## 2024-07-04 RX ORDER — ACETAMINOPHEN 500 MG
1000 TABLET ORAL EVERY 8 HOURS PRN
Qty: 90 TABLET | Refills: 0 | Status: SHIPPED | OUTPATIENT
Start: 2024-07-04

## 2024-07-04 RX ORDER — ATORVASTATIN CALCIUM 80 MG/1
80 TABLET, FILM COATED ORAL DAILY
Qty: 30 TABLET | Refills: 11 | Status: SHIPPED | OUTPATIENT
Start: 2024-07-04 | End: 2025-07-04

## 2024-07-04 RX ORDER — CALCIUM ACETATE 667 MG/1
667 CAPSULE ORAL
Qty: 90 CAPSULE | Refills: 11 | Status: SHIPPED | OUTPATIENT
Start: 2024-07-04 | End: 2025-07-04

## 2024-07-04 RX ORDER — FAMOTIDINE 20 MG/1
20 TABLET, FILM COATED ORAL DAILY
Qty: 30 TABLET | Refills: 11 | Status: SHIPPED | OUTPATIENT
Start: 2024-07-04

## 2024-07-04 RX ORDER — SODIUM BICARBONATE 650 MG/1
650 TABLET ORAL 3 TIMES DAILY
Qty: 90 TABLET | Refills: 11 | Status: SHIPPED | OUTPATIENT
Start: 2024-07-04

## 2024-07-04 RX ORDER — TRAZODONE HYDROCHLORIDE 50 MG/1
25 TABLET ORAL NIGHTLY
Qty: 15 TABLET | Refills: 11 | Status: SHIPPED | OUTPATIENT
Start: 2024-07-04 | End: 2025-07-04

## 2024-07-04 RX ORDER — LANCETS 33 GAUGE
EACH MISCELLANEOUS
Qty: 100 EACH | Refills: 3 | Status: SHIPPED | OUTPATIENT
Start: 2024-07-04

## 2024-07-04 RX ORDER — FUROSEMIDE 40 MG/1
40 TABLET ORAL DAILY
Qty: 30 TABLET | Refills: 11 | Status: SHIPPED | OUTPATIENT
Start: 2024-07-04

## 2024-07-04 RX ORDER — HYDRALAZINE HYDROCHLORIDE 50 MG/1
50 TABLET, FILM COATED ORAL 3 TIMES DAILY
Qty: 90 TABLET | Refills: 11 | Status: SHIPPED | OUTPATIENT
Start: 2024-07-04 | End: 2025-07-04

## 2024-07-04 RX ORDER — CLOPIDOGREL BISULFATE 75 MG/1
75 TABLET ORAL DAILY
Qty: 30 TABLET | Refills: 11 | Status: SHIPPED | OUTPATIENT
Start: 2024-07-04 | End: 2025-07-04

## 2024-07-04 RX ORDER — CARVEDILOL 6.25 MG/1
6.25 TABLET ORAL 2 TIMES DAILY
Qty: 60 TABLET | Refills: 11 | Status: SHIPPED | OUTPATIENT
Start: 2024-07-04 | End: 2025-07-04

## 2024-07-04 RX ORDER — SERTRALINE HYDROCHLORIDE 100 MG/1
100 TABLET, FILM COATED ORAL DAILY
Qty: 30 TABLET | Refills: 11 | Status: SHIPPED | OUTPATIENT
Start: 2024-07-04 | End: 2025-07-04

## 2024-07-04 RX ORDER — DEXTROSE 4 G
TABLET,CHEWABLE ORAL
Qty: 1 EACH | Refills: 0 | Status: SHIPPED | OUTPATIENT
Start: 2024-07-04

## 2024-07-04 RX ORDER — ALLOPURINOL 100 MG/1
50 TABLET ORAL EVERY OTHER DAY
Qty: 8 TABLET | Refills: 11 | Status: SHIPPED | OUTPATIENT
Start: 2024-07-04 | End: 2025-06-29

## 2024-07-04 RX ORDER — PREGABALIN 50 MG/1
50 CAPSULE ORAL 2 TIMES DAILY PRN
Qty: 60 CAPSULE | Refills: 0 | Status: SHIPPED | OUTPATIENT
Start: 2024-07-04 | End: 2025-01-02

## 2024-07-04 RX ADMIN — CARVEDILOL 6.25 MG: 6.25 TABLET, FILM COATED ORAL at 10:07

## 2024-07-04 RX ADMIN — FUROSEMIDE 40 MG: 40 TABLET ORAL at 10:07

## 2024-07-04 RX ADMIN — ACETAMINOPHEN 1000 MG: 500 TABLET ORAL at 08:07

## 2024-07-04 RX ADMIN — ALLOPURINOL 50 MG: 300 TABLET ORAL at 10:07

## 2024-07-04 RX ADMIN — PREGABALIN 50 MG: 50 CAPSULE ORAL at 10:07

## 2024-07-04 RX ADMIN — CALCIUM ACETATE 667 MG: 667 CAPSULE ORAL at 10:07

## 2024-07-04 RX ADMIN — INSULIN ASPART 3 UNITS: 100 INJECTION, SOLUTION INTRAVENOUS; SUBCUTANEOUS at 12:07

## 2024-07-04 RX ADMIN — APIXABAN 5 MG: 5 TABLET, FILM COATED ORAL at 10:07

## 2024-07-04 RX ADMIN — INSULIN ASPART 3 UNITS: 100 INJECTION, SOLUTION INTRAVENOUS; SUBCUTANEOUS at 04:07

## 2024-07-04 RX ADMIN — CALCIUM ACETATE 667 MG: 667 CAPSULE ORAL at 12:07

## 2024-07-04 RX ADMIN — ATORVASTATIN CALCIUM 80 MG: 40 TABLET, FILM COATED ORAL at 10:07

## 2024-07-04 RX ADMIN — HYDRALAZINE HYDROCHLORIDE 50 MG: 50 TABLET ORAL at 10:07

## 2024-07-04 RX ADMIN — HYDROXYZINE HYDROCHLORIDE 25 MG: 25 TABLET, FILM COATED ORAL at 10:07

## 2024-07-04 RX ADMIN — FAMOTIDINE 20 MG: 20 TABLET ORAL at 10:07

## 2024-07-04 RX ADMIN — CLOPIDOGREL BISULFATE 75 MG: 75 TABLET ORAL at 10:07

## 2024-07-04 RX ADMIN — CALCIUM ACETATE 667 MG: 667 CAPSULE ORAL at 04:07

## 2024-07-04 RX ADMIN — INSULIN ASPART 3 UNITS: 100 INJECTION, SOLUTION INTRAVENOUS; SUBCUTANEOUS at 09:07

## 2024-07-04 RX ADMIN — SERTRALINE 100 MG: 100 TABLET, FILM COATED ORAL at 10:07

## 2024-07-04 RX ADMIN — TRAZODONE HYDROCHLORIDE 25 MG: 50 TABLET ORAL at 10:07

## 2024-07-04 RX ADMIN — CALCITRIOL 0.5 MCG: 0.5 CAPSULE, LIQUID FILLED ORAL at 10:07

## 2024-07-04 NOTE — PLAN OF CARE
Problem: Infection  Goal: Absence of Infection Signs and Symptoms  Outcome: Progressing     Problem: Adult Inpatient Plan of Care  Goal: Plan of Care Review  Outcome: Progressing  Goal: Patient-Specific Goal (Individualized)  Outcome: Progressing  Goal: Absence of Hospital-Acquired Illness or Injury  Outcome: Progressing  Goal: Optimal Comfort and Wellbeing  Outcome: Progressing  Goal: Readiness for Transition of Care  Outcome: Progressing     Problem: Bariatric Environmental Safety  Goal: Safety Maintained with Care  Outcome: Progressing     Problem: Skin Injury Risk Increased  Goal: Skin Health and Integrity  Outcome: Progressing     Problem: Diabetes Comorbidity  Goal: Blood Glucose Level Within Targeted Range  Outcome: Progressing     Problem: Sepsis/Septic Shock  Goal: Optimal Coping  Outcome: Progressing  Goal: Absence of Bleeding  Outcome: Progressing  Goal: Blood Glucose Level Within Targeted Range  Outcome: Progressing  Goal: Absence of Infection Signs and Symptoms  Outcome: Progressing  Goal: Optimal Nutrition Intake  Outcome: Progressing     Problem: Wound  Goal: Optimal Coping  Outcome: Progressing  Goal: Optimal Functional Ability  Outcome: Progressing  Goal: Absence of Infection Signs and Symptoms  Outcome: Progressing  Goal: Improved Oral Intake  Outcome: Progressing  Goal: Optimal Pain Control and Function  Outcome: Progressing  Goal: Skin Health and Integrity  Outcome: Progressing  Goal: Optimal Wound Healing  Outcome: Progressing     Problem: Wound  Goal: Optimal Coping  Outcome: Progressing  Goal: Optimal Functional Ability  Outcome: Progressing  Goal: Absence of Infection Signs and Symptoms  Outcome: Progressing  Goal: Improved Oral Intake  Outcome: Progressing  Goal: Optimal Pain Control and Function  Outcome: Progressing  Goal: Skin Health and Integrity  Outcome: Progressing  Goal: Optimal Wound Healing  Outcome: Progressing     Problem: Pain Acute  Goal: Optimal Pain Control and  Function  Outcome: Progressing     Problem: Fall Injury Risk  Goal: Absence of Fall and Fall-Related Injury  Outcome: Progressing     Problem: Chronic Kidney Disease  Goal: Electrolyte Balance  Outcome: Progressing

## 2024-07-04 NOTE — PLAN OF CARE
CM received message from [facility] on-call:    Picture Date/Time: 07- 21:38:48 Picture Reason: No-one @ Address - Tagged Door Comments: Pt nor spouse answered the phone for delivery. Both phone numbers have a voice-mail that has not been setup. Delivery ticket doesn't have apt number. 2many apts. No one was there to accept delivery     CM provided [facility] w/ spouse Randell's number and that pt will be going to [address] at the address location. There are no deliveries today so the DME will be delivered tmw - CM updated pt's spouse w/ the DME info.    DIONISIO Curran, RN, [facility]  Transitional Care Manager  [phone]  [email or web address]

## 2024-07-04 NOTE — PROGRESS NOTES
[facility] [address] [place] [address] Medicine  Progress Note    Patient Name: Suyapa Connelly  MRN: [id number]  Patient Class: IP- Inpatient   Admission Date: 6/21/2024  Length of Stay: 12 days  Attending Physician: Frances Bee MD  Primary Care Provider: Feliciano Nguyen III, PA-C        Subjective:     Principal Problem:Acute encephalopathy        HPI:  Suyapa Connelly is a 58 y.o. female with a PMHx of HTN, GERD, HLD, PVD, ESRD on HD MFW, b/l AKAs, CAD s/p CABG, afib on eliquis, depression, and anxiety who presents to the ED for evaluation of confusion. HPI and ROS limited secondary to patient's mental status and poor participation. Per patient's  she has been more sleepy and less interactive over the past several days. He reports that her urine has been foul-smelling and more cloudy. He states she was also peeing more frequently in complaining of burning with urination and some occasional suprapubic pain. Denies any fevers or chills. The patient denies CP, SOB, abdominal pain, or dysuria. Per  patient was dialyzed today a full dialysis and they came straight here after.     In the ED, VSS, afebrile. CBC with stable leukopenia and anemia. Na 129, Chloride 94, Bicarb 22, Glucose 50. POC glucose 150. Cr 2.7 (consistent with ESRD). Albumin 3.2. Tbili 1.1. Mag 2.1. UA with 3+ leukocytosis, 74 WBCs, but no bacteria. CTH with no acute finding or detrimental change when compared with 06/11/2024, allowing for motion and artifact limitations. The patient received 1g IV Rocephin.    Overview/Hospital Course:  Suyapa Connelly is a 58 y.o. F who was admitted to  for further evaluation of acute encephalopathy. Most likely 2/2 UTI, other workup negative. AAOx3 now. Initiated rocephin for infectious appearing UA. Following culture - grew Candida krusei, stopped rocephin. ID consulted for recommendations. Recommend deferring treatment, obtain CT abd/pelvis. CT with findings of volume overload, otherwise no  acute intraabdominal abnormality. Given x1 120mg IV lasix. Repeating UA with straight cath and following cx per ID recs. Nephrology following for HD management: HD performed 6/24 and 6/26. Psych consulted for recommendations: continue zoloft, add trazodone qhs. CM assisting with discharge planning - NH declined admission.  DC planning home commenced with HH, DME, and close case management oversight.     Interval History:      NAEON.  HD today.  VSS, labs stable.  Arranging DME for discharge.  See medical necessity items below.     Review of Systems   Constitutional:  Positive for fatigue. Negative for activity change, appetite change, chills, diaphoresis and fever.   HENT:  Negative for congestion, facial swelling, hearing loss, nosebleeds, sinus pressure, sinus pain, sneezing, sore throat, tinnitus, trouble swallowing and voice change.    Eyes:  Negative for photophobia, pain, discharge and visual disturbance.   Respiratory:  Negative for cough, chest tightness, shortness of breath and wheezing.    Cardiovascular:  Negative for chest pain, palpitations and leg swelling.   Gastrointestinal:  Negative for abdominal distention, abdominal pain, blood in stool, constipation, diarrhea, nausea and vomiting.   Endocrine: Negative for cold intolerance, heat intolerance and polyuria.   Genitourinary:  Negative for decreased urine volume, difficulty urinating, dysuria, flank pain, frequency and urgency.   Musculoskeletal:  Negative for arthralgias, gait problem, myalgias, neck pain and neck stiffness.   Skin:  Negative for rash and wound.   Neurological:  Positive for weakness. Negative for dizziness, speech difficulty, light-headedness and headaches.   Hematological:  Negative for adenopathy. Does not bruise/bleed easily.   Psychiatric/Behavioral:  Negative for agitation, confusion, self-injury, sleep disturbance and suicidal ideas. The patient is not nervous/anxious.       Objective:      Temp:  [97.2 °F (36.2 °C)-98.2 °F  (36.8 °C)] 98.1 °F (36.7 °C)  Pulse:  [70-79] 75  Resp:  [16-18] 16  SpO2:  [93 %-100 %] 100 %  BP: (118-136)/(56-95) 118/56    Weight: 80.5 kg (177 lb 7.5 oz)  Body mass index is 54.16 kg/m².       Intake/Output Summary (Last 24 hours) at 7/4/2024 1328  Last data filed at 7/4/2024 0629  Gross per 24 hour   Intake 620 ml   Output 50 ml   Net 570 ml             Physical Exam  Vitals and nursing note reviewed.   Constitutional:       Appearance: She is well-developed.   Eyes:      Pupils: Pupils are equal, round, and reactive to light.   Cardiovascular:      Rate and Rhythm: Normal rate and regular rhythm.   Pulmonary:      Effort: Pulmonary effort is normal.      Breath sounds: Normal breath sounds.   Abdominal:      Palpations: Abdomen is soft.      Tenderness: There is no abdominal tenderness.   Musculoskeletal:         General: No tenderness.      Comments: B/L LE amputations noted   Skin:     General: Skin is warm and dry.   Neurological:      Mental Status: She is alert.      Comments: Oriented to person, place and year   Psychiatric:         Behavior: Behavior normal.                Significant Labs: All pertinent labs within the past 24 hours have been reviewed.     Significant Imaging: I have reviewed all pertinent imaging results/findings within the past 24 hours.      Assessment/Plan:      * Acute encephalopathy  Resolved  Patient has acute metabolic encephalopathy that likely secondary to UTI.Treatment for underlying cause is underway. Pt with recurrent admissions for encephalopathy with extensive work ups including EEG, CTH, MRI brain, and vitamin panels. Will monitor neuro status carefully, avoid narcotics and benzos that will exacerbate agitation, and use PRN anti-psychotics for controls of behavior for self harm.  -UA concerning for infection- growing candida krusei  -CT abd/pelvis - no acute intraabdominal abnormality  -CXR with diffuse increased interstitial and parenchymal attenuation similar to  prior study. No large region of confluent airspace consolidation or substantial volume of pleural fluid identified.   -CTH-negative  - ID consulted  1. Monitor clinically  2. Rec laxatives for constipation  3. Abx and antifungals not recommended  4. Seen with ID staff   -Delirium precautions   - reports progressive decline in patient over the last few months. Initiated NH paperwork for patient. CM notified for assistance, patient is medically ready    Severe obesity (BMI >= 40)  Body mass index is 54.09 kg/m². Morbid obesity complicates all aspects of disease management from diagnostic modalities to treatment.     ESRD (end stage renal disease)  Chronic kidney disease-mineral and bone disorder   MWF schedule, last dialyzed 6/26  Residual renal function?- Yes  - Nephrology consulted  - Continue chronic hemodialysis  - Monitor daily electrolytes and defer dialysis orders to nephrology  - Renally dose medications  - Renal diet  - Continue phosphorus binders  - Daily weights/strict I&Os  - 1.5L FR    Debility  Patient with Chronic debility due to other reduced mobility. Latest AMPAC and GEMS scores have not been reviewed. Plan includes progressive mobility protocol initated and fall precautions in place.  - family initiated NH paperwork  - CM notified    S/P AKA (above knee amputation) bilateral  History noted.  -Fall precautions  -DME needed WC, hospital bed.     Suyapa Connelly requires a hospital bed for positioning of the body in ways that are not feasible with an ordinary bed. The patient requires special positioning for pain relief, limited mobility, and/or being unable to independently make changes in body position without the use of a hospital bed. Pillows and wedges will not be adequate for resolving these positional issues.     Suyapa Connelly has a mobility limitation that significantly impairs her ability to participate in one or more mobility related activities of daily living (MRADL's) such as  toileting, feeding, dressing, grooming, and bathing in customary locations in the home. The mobility limitation cannot be sufficiently resolved by the use of a cane or walker. The use of a manual wheelchair will significantly improve the patient's ability to participate in MRADLS and the patient will use it on regular basis in the home. Suyapa Connelly has expressed her willingness to use a manual wheelchair in the home. She also has a caregiver who is available, willing, and able to provide assistance with the wheelchair when needed.       Chronic combined systolic and diastolic heart failure  Patient is identified as having Combined Systolic and Diastolic heart failure that is Chronic. CHF is currently controlled. Latest ECHO performed and demonstrates- Results for orders placed during the hospital encounter of 05/07/24    Echo    Interpretation Summary    Left Ventricle: The left ventricle is mildly dilated. Ventricular mass is normal. Normal wall thickness. Severe global hypokinesis present. There is severely reduced systolic function. Ejection fraction by visual approximation is 15%. There is diastolic dysfunction.    Right Ventricle: Severe right ventricular enlargement. Wall thickness is normal. Right ventricle wall motion has global hypokinesis. Systolic function is mildly reduced.    Left Atrium: Left atrium is severely dilated. There is an atrial septal aneurysm.    Right Atrium: Right atrium is dilated.    Aortic Valve: There is mild aortic regurgitation.    Mitral Valve: There is mild regurgitation.    Tricuspid Valve: There is annular dilation present. There is normal leaflet mobility. There is severe functional regurgitation.    Pulmonary Artery: The estimated pulmonary artery systolic pressure is at least 24 mmHg. PASP is likely under-estimated in the setting of severe tricuspid regurgitation.    IVC/SVC: Central venous pressure of at least 8 mmHg.    No vegetation seen.  . Continue Beta Blocker,  "lasix and monitor clinical status closely. Monitor on telemetry. Patient is off CHF pathway.  Monitor strict Is&Os and daily weights.  Place on fluid restriction of 1.5 L. Continue to stress to patient importance of self efficacy and  on diet for CHF. Last BNP reviewed- and noted below No results for input(s): "BNP", "BNPTRIAGEBLO" in the last 168 hours..  -Volume management with HD.      Hyponatremia  Patient has hyponatremia which is uncontrolled,We will aim to correct the sodium by 4-6mEq in 24 hours. We will monitor sodium Daily. The hyponatremia is due to Medications: SSRIs and renal insufficiency. We will obtain the following studies: Urine sodium, urine osmolality, serum osmolality. We will treat the hyponatremia with Hemodialysis and Removal of offending medications. The patient's sodium results have been reviewed and are listed below.  No results for input(s): "NA" in the last 24 hours.  - patient with chronic hyponatremia most likely 2/2 ESRD    Paroxysmal atrial fibrillation  Patient with Paroxysmal (<7 days) atrial fibrillation which is controlled currently with Beta Blocker. Patient is currently in sinus rhythm.[id number] Score: 3. Anticoagulation indicated. Anticoagulation done with eliquis .    Anemia due to chronic kidney disease, on chronic dialysis  Patient's anemia is currently controlled. Has not received any PRBCs to date. Etiology likely d/t chronic disease due to ESRD  Current CBC reviewed-   Lab Results   Component Value Date    HGB 9.7 (L) 06/26/2024    HCT 30.4 (L) 06/26/2024     Monitor serial CBC and transfuse if patient becomes hemodynamically unstable, symptomatic or H/H drops below 7/21.    CAD (coronary artery disease)  Patient with known CAD s/p CABG, which is controlled Will continue Plavix and Statin and monitor for S/Sx of angina/ACS. Continue to monitor on telemetry.     Type 2 diabetes mellitus with hyperglycemia, with long-term current use of insulin  Patient's FSGs are " not controlled on current hypoglycemics due to hypoglycemia.   Last A1c reviewed-   Lab Results   Component Value Date    HGBA1C 8.0 (H) 04/24/2024     Most recent fingerstick glucose reviewed-   Recent Labs   Lab 06/26/24  1629 06/26/24  2154 06/27/24  0800 06/27/24  1119   POCTGLUCOSE 164* 252* 170* 147*       Current correctional scale  Low  Maintain anti-hyperglycemic dose as follows-   Antihyperglycemics (From admission, onward)      Start     Stop Route Frequency Ordered    06/24/24 1645  insulin aspart U-100 pen 3 Units         -- SubQ 3 times daily with meals 06/24/24 1335    06/22/24 0032  insulin aspart U-100 pen 0-5 Units         -- SubQ Before meals & nightly PRN 06/21/24 2332        -Pt initially hypoglycemic which improved with D10 bolus.   -Accuchecks AC/HS    Acute cystitis without hematuria  -Pt denies any abd pain, dysuria, urinary frequency, or fever/chills. Per  patient's urine has been foul smelling and cloudy. Per  patient has also been complaining of suprapubic pain and dysuria at home.  -Afebrile, WBC 3.80 (chronic leukopenia).  -UA reviewed, follow urine cx.  -Pt received IV rocephin in the ED, continue for now.  -culture with candida krusei  - ID consulted for recommendations - no tx indicated        CAROLIN (generalized anxiety disorder)  -Chronic, uncontrolled.  -Hold zoloft for now pending hyponatremia work up.   -Can consider psychiatry consult - consult placed:   - recommend continuing zoloft, trazadone 25mg qhs    Peripheral vascular disease  -Chronic issue.  -Continue plavix and statin.      VTE Risk Mitigation (From admission, onward)           Ordered     heparin (porcine) injection 1,000 Units  As needed (PRN)         06/24/24 1001     apixaban tablet 5 mg  2 times daily         06/21/24 2332     IP VTE HIGH RISK PATIENT  Once         06/21/24 2332     Reason for No Pharmacological VTE Prophylaxis  Once        Question:  Reasons:  Answer:  Already adequately  anticoagulated on oral Anticoagulants    06/21/24 9072                    Discharge Planning   DEVAN: 7/4/2024     Code Status: Full Code   Is the patient medically ready for discharge?:     Reason for patient still in hospital (select all that apply): Pending disposition  Discharge Plan A: New Nursing Home placement - residential care facility                  Frances Bee MD  Department of Hospital Medicine   [facility]

## 2024-07-04 NOTE — PLAN OF CARE
CM contacted [facility] again to verify DME delivery and msg left - awaiting callback.     CM contacted spouse Randell - he stated there was no DME delivery delivery last night and he attempted to contact them w/ N/A.    [facility] has a pending delivery of w/c and hospital bed for pt. CM messaged CM supervisor to clarify if [facility] is open and/or has on-call - CM will continue to follow.    TAINA CurranN, RN, [facility]  Transitional Care Manager  [phone]  [email or web address]

## 2024-07-04 NOTE — PLAN OF CARE
CM left msg for [facility] to verify DME delivery - awaiting callback.    TAINA CurranN, RN, [facility]  Transitional Care Manager  [phone]  [email or web address]

## 2024-07-05 ENCOUNTER — TELEPHONE (OUTPATIENT)
Dept: PRIMARY CARE CLINIC | Facility: CLINIC | Age: 59
End: 2024-07-05
Payer: MEDICARE

## 2024-07-05 VITALS
HEIGHT: 55 IN | TEMPERATURE: 98 F | RESPIRATION RATE: 12 BRPM | BODY MASS INDEX: 41.08 KG/M2 | HEART RATE: 71 BPM | OXYGEN SATURATION: 99 % | SYSTOLIC BLOOD PRESSURE: 125 MMHG | WEIGHT: 177.5 LBS | DIASTOLIC BLOOD PRESSURE: 61 MMHG

## 2024-07-05 LAB
ALBUMIN SERPL BCP-MCNC: 2.8 G/DL (ref 3.5–5.2)
ANION GAP SERPL CALC-SCNC: 11 MMOL/L (ref 8–16)
BUN SERPL-MCNC: 53 MG/DL (ref 6–20)
CALCIUM SERPL-MCNC: 8.9 MG/DL (ref 8.7–10.5)
CHLORIDE SERPL-SCNC: 100 MMOL/L (ref 95–110)
CO2 SERPL-SCNC: 19 MMOL/L (ref 23–29)
CREAT SERPL-MCNC: 4.4 MG/DL (ref 0.5–1.4)
EST. GFR  (NO RACE VARIABLE): 11.1 ML/MIN/1.73 M^2
GLUCOSE SERPL-MCNC: 117 MG/DL (ref 70–110)
PHOSPHATE SERPL-MCNC: 3.9 MG/DL (ref 2.7–4.5)
POCT GLUCOSE: 100 MG/DL (ref 70–110)
POTASSIUM SERPL-SCNC: 5.4 MMOL/L (ref 3.5–5.1)
SODIUM SERPL-SCNC: 130 MMOL/L (ref 136–145)

## 2024-07-05 PROCEDURE — 25000003 PHARM REV CODE 250: Mod: HCNC

## 2024-07-05 PROCEDURE — 25000003 PHARM REV CODE 250: Mod: HCNC | Performed by: INTERNAL MEDICINE

## 2024-07-05 PROCEDURE — 80069 RENAL FUNCTION PANEL: CPT | Mod: HCNC | Performed by: HOSPITALIST

## 2024-07-05 PROCEDURE — 25000003 PHARM REV CODE 250: Mod: HCNC | Performed by: NURSE PRACTITIONER

## 2024-07-05 PROCEDURE — 63600175 PHARM REV CODE 636 W HCPCS: Mod: HCNC

## 2024-07-05 PROCEDURE — 90935 HEMODIALYSIS ONE EVALUATION: CPT | Mod: HCNC,GC,, | Performed by: INTERNAL MEDICINE

## 2024-07-05 PROCEDURE — 80100016 HC MAINTENANCE HEMODIALYSIS: Mod: HCNC

## 2024-07-05 RX ORDER — SODIUM CHLORIDE 9 MG/ML
INJECTION, SOLUTION INTRAVENOUS ONCE
Status: COMPLETED | OUTPATIENT
Start: 2024-07-05 | End: 2024-07-05

## 2024-07-05 RX ADMIN — APIXABAN 5 MG: 5 TABLET, FILM COATED ORAL at 11:07

## 2024-07-05 RX ADMIN — ERYTHROPOIETIN 4000 UNITS: 4000 INJECTION, SOLUTION INTRAVENOUS; SUBCUTANEOUS at 10:07

## 2024-07-05 RX ADMIN — FUROSEMIDE 40 MG: 40 TABLET ORAL at 11:07

## 2024-07-05 RX ADMIN — INSULIN ASPART 3 UNITS: 100 INJECTION, SOLUTION INTRAVENOUS; SUBCUTANEOUS at 11:07

## 2024-07-05 RX ADMIN — CARVEDILOL 6.25 MG: 6.25 TABLET, FILM COATED ORAL at 09:07

## 2024-07-05 RX ADMIN — SERTRALINE 100 MG: 100 TABLET, FILM COATED ORAL at 12:07

## 2024-07-05 RX ADMIN — FAMOTIDINE 20 MG: 20 TABLET ORAL at 11:07

## 2024-07-05 RX ADMIN — HEPARIN SODIUM 1000 UNITS: 1000 INJECTION, SOLUTION INTRAVENOUS; SUBCUTANEOUS at 11:07

## 2024-07-05 RX ADMIN — CALCIUM ACETATE 667 MG: 667 CAPSULE ORAL at 11:07

## 2024-07-05 RX ADMIN — CLOPIDOGREL BISULFATE 75 MG: 75 TABLET ORAL at 11:07

## 2024-07-05 RX ADMIN — SODIUM CHLORIDE: 9 INJECTION, SOLUTION INTRAVENOUS at 07:07

## 2024-07-05 RX ADMIN — PREGABALIN 50 MG: 50 CAPSULE ORAL at 12:07

## 2024-07-05 RX ADMIN — ATORVASTATIN CALCIUM 80 MG: 40 TABLET, FILM COATED ORAL at 11:07

## 2024-07-05 RX ADMIN — CALCITRIOL 0.5 MCG: 0.5 CAPSULE, LIQUID FILLED ORAL at 11:07

## 2024-07-05 NOTE — TELEPHONE ENCOUNTER
Pt spouse is requesting to Est care at [facility] on [facility]. Pt spouse is calling [phone] to set up appt. No other concerns were voiced

## 2024-07-05 NOTE — NURSING
3.5hours  dialysis was completed . 1L  UF was removed .Right HD catheters were Flushed , heparin locked and wrapped with tape and gauze. Patient was transported in bed . Report was given to DEANGELO Tanner.

## 2024-07-05 NOTE — ASSESSMENT & PLAN NOTE
58 y.o. Black or  Female ESRD-HD M-W-F presents to ED on 6/21/2024 with diagnosis of: Hypoglycemia [E16.2];Encephalopathy [G93.40];Chest pain [R07.9];Urinary tract infection with hematuria, site unspecified [N39.0, R31.9]   Nephrology consulted for inpatient ESRD-HD management    Outpatient HD Information:  -Dialysis modality: Hemodialysis  -Outpatient HD unit: [facility] [place]  -Nephrologist: ?  -HD TX days: Monday/Wednesday/Friday, duration of treatment: 3 hrs   -Last HD TX prior to hospital admission: 6/21/24  -Dialysis access: dialysis catheter   -Residual urine: Minium  -EDW: 67 kg     Assessment:   - Dialysis for metabolic clearance and volume management with Left Tunneled catheter.  - dropped blood pressure 106/65 mmHg during HD, UF decreased to 1 L x 3 hrs- then blood pressure stable at 130 /60  - Recommend holding blood pressure medicines prior to HD so patient may better tolerate HD with fluid removal   - New lab k 5.4  --- potassium bath decreased to 2 K bath from 3k bath.  - continue EPO 50 units with HD x 3 times a week.  - continue calcium acetate 667 TID  - continue calcitriol 0.5 mcg daily  - Recommend renal diet,   - Will continue iHD thrice weekly unless emergent conditions arise.    Anemia of ESRD   Recent Labs   Lab 06/30/24  0657 07/03/24  0513   WBC 4.24 3.43*   HGB 10.0* 9.2*   HCT 32.3* 28.2*    197       Lab Results   Component Value Date    FESATURATED 35 06/22/2024    FERRITIN 757 (H) 06/22/2024       - Goal in ESRD is Hgb of 10-11. Near target.       Mineral Bone Disease in ESRD   Lab Results   Component Value Date    PTH 1,132 (H) 06/17/2024    CALCIUM 8.9 07/05/2024    ALBUMIN 2.8 (L) 07/05/2024    CAION 0.92 (L) 05/17/2023    PHOS 3.9 07/05/2024

## 2024-07-05 NOTE — SUBJECTIVE & OBJECTIVE
Interval History: Seen in HD unit, BP drop during HD    Review of patient's allergies indicates:   Allergen Reactions    Ciprofloxacin Itching    Iodine      Kidney injury    Pcn [penicillins]      Rash; tolerated ceftriaxone on 1/13/20     Current Facility-Administered Medications   Medication Frequency    0.9%  NaCl infusion Once    acetaminophen tablet 1,000 mg Q8H PRN    acetaminophen tablet 650 mg Q6H PRN    allopurinol split tablet 50 mg Every other day    apixaban tablet 5 mg BID    atorvastatin tablet 80 mg Daily    barium sulfate (READI-CAT) suspension 450 mL ONCE PRN    calcitRIOL capsule 0.5 mcg Daily    calcium acetate(phosphat bind) capsule 667 mg TID WM    carvediloL tablet 6.25 mg BID    clopidogreL tablet 75 mg Daily    dextrose 10% bolus 125 mL 125 mL PRN    dextrose 10% bolus 250 mL 250 mL PRN    diphenhydrAMINE capsule 25 mg Q6H PRN    diphenhydrAMINE injection 50 mg On Call Procedure    epoetin nina injection 4,000 Units Every Mon, Wed, Fri    famotidine tablet 20 mg Daily    furosemide tablet 40 mg Daily    glucagon (human recombinant) injection 1 mg PRN    glucose chewable tablet 16 g PRN    glucose chewable tablet 24 g PRN    heparin (porcine) injection 1,000 Units PRN    hydrALAZINE tablet 50 mg TID    hydrocortisone 2.5 % cream BID PRN    hydrOXYzine HCL tablet 25 mg TID PRN    insulin aspart U-100 pen 0-5 Units QID (AC + HS) PRN    insulin aspart U-100 pen 3 Units TIDWM    melatonin tablet 6 mg Nightly PRN    naloxone 0.4 mg/mL injection 0.02 mg PRN    pregabalin capsule 50 mg BID    prochlorperazine injection Soln 5 mg Q6H PRN    sertraline tablet 100 mg Daily    sodium chloride 0.9% flush 10 mL Q12H PRN    trazodone split tablet 25 mg QHS       Objective:     Vital Signs (Most Recent):  Temp: 98.3 °F (36.8 °C) (07/05/24 0425)  Pulse: 70 (07/05/24 0902)  Resp: 12 (07/05/24 0425)  BP: (!) 118/55 (07/05/24 0902)  SpO2: 99 % (07/05/24 0425) Vital Signs (24h Range):  Temp:  [97.6 °F (36.4  °C)-98.3 °F (36.8 °C)] 98.3 °F (36.8 °C)  Pulse:  [67-75] 70  Resp:  [12-16] 12  SpO2:  [95 %-100 %] 99 %  BP: ()/(55-86) 118/55     Weight: 80.5 kg (177 lb 7.5 oz) (07/01/24 0400)  Body mass index is 54.16 kg/m².  Body surface area is 1.65 meters squared.    I/O last 3 completed shifts:  In: 735 [P.O.:735]  Out: 50 [Urine:50]     Physical Exam  Vitals reviewed.   HENT:      Head: Normocephalic and atraumatic.   Eyes:      Conjunctiva/sclera: Conjunctivae normal.   Cardiovascular:      Rate and Rhythm: Normal rate.   Pulmonary:      Effort: Pulmonary effort is normal. No respiratory distress.   Musculoskeletal:      Comments: Bilateral AKA. Mild LE edema.    Skin:     General: Skin is warm and dry.   Neurological:      Mental Status: Mental status is at baseline.   Psychiatric:         Mood and Affect: Mood normal.          Significant Labs:  CBC:   Recent Labs   Lab 07/03/24  0513   WBC 3.43*   RBC 2.77*   HGB 9.2*   HCT 28.2*      *   MCH 33.2*   MCHC 32.6     CMP:   Recent Labs   Lab 07/05/24  0807   *   CALCIUM 8.9   ALBUMIN 2.8*   *   K 5.4*   CO2 19*      BUN 53*   CREATININE 4.4*     All labs within the past 24 hours have been reviewed.

## 2024-07-05 NOTE — TELEPHONE ENCOUNTER
----- Message from Maryann Ferguson sent at 7/5/2024 10:56 AM CDT -----  Contact: Main campus  Type:  Sooner Apoointment Request    Caller is requesting a sooner appointment.  Caller declined first available appointment listed below.  Caller will not accept being placed on the waitlist and is requesting a message be sent to doctor.    Name of Caller: [facility]    When is the first available appointment? Epic not pulling appts    Symptoms: hospital follow up    Would the patient rather a call back or a response via [facility]?  Call back    Best Call Back Number:[phone]    Additional Information: needs a 1 week follow up    Please call to schedule  Thanks

## 2024-07-05 NOTE — PROGRESS NOTES
[facility]  Nephrology  Progress Note    Patient Name: Suyapa Connelly  MRN: [id number]  Admission Date: 6/21/2024  Hospital Length of Stay: 13 days  Attending Provider: Frances Bee MD   Primary Care Physician: Feliciano Nguyen III, PA-C  Principal Problem:Acute encephalopathy    Subjective:     HPI: The patient is a 58 y.o. Black or  Female with multiple co morbidities including HTN, GERD, HLD, PVD, ESRD on HD MFW, b/l AKAs, CAD s/p CABG, afib on eliquis, depression, and anxiety  who presents to ED on 6/21/2024 for evaluation of confusion per  at bedside. Per patient's  she has been more sleepy and less interactive over the past several days. He reports that her urine has been foul-smelling and more cloudy. The patient denies CP, SOB, abdominal pain, or dysuria. Per  patient was dialyzed today a full dialysis and they came straight here after. She had a full HD treatment yesterday, 6/21.     In the ED, VSS, afebrile. CBC with stable leukopenia and anemia. Na 129, Chloride 94, Bicarb 22, Glucose 50. POC glucose 150. Cr 2.7 (consistent with ESRD). Albumin 3.2. Tbili 1.1. Mag 2.1. UA with 3+ leukocytosis, 74 WBCs, but no bacteria. CTH with no acute finding or detrimental change when compared with 06/11/2024, allowing for motion and artifact limitations. The patient received 1g IV Rocephin. Nephrology consulted for management of ESRD and HD treatment.    Interval History: Seen in HD unit, BP drop during HD    Review of patient's allergies indicates:   Allergen Reactions    Ciprofloxacin Itching    Iodine      Kidney injury    Pcn [penicillins]      Rash; tolerated ceftriaxone on 1/13/20     Current Facility-Administered Medications   Medication Frequency    0.9%  NaCl infusion Once    acetaminophen tablet 1,000 mg Q8H PRN    acetaminophen tablet 650 mg Q6H PRN    allopurinol split tablet 50 mg Every other day    apixaban tablet 5 mg BID    atorvastatin tablet 80 mg  Daily    barium sulfate (READI-CAT) suspension 450 mL ONCE PRN    calcitRIOL capsule 0.5 mcg Daily    calcium acetate(phosphat bind) capsule 667 mg TID WM    carvediloL tablet 6.25 mg BID    clopidogreL tablet 75 mg Daily    dextrose 10% bolus 125 mL 125 mL PRN    dextrose 10% bolus 250 mL 250 mL PRN    diphenhydrAMINE capsule 25 mg Q6H PRN    diphenhydrAMINE injection 50 mg On Call Procedure    epoetin nina injection 4,000 Units Every Mon, Wed, Fri    famotidine tablet 20 mg Daily    furosemide tablet 40 mg Daily    glucagon (human recombinant) injection 1 mg PRN    glucose chewable tablet 16 g PRN    glucose chewable tablet 24 g PRN    heparin (porcine) injection 1,000 Units PRN    hydrALAZINE tablet 50 mg TID    hydrocortisone 2.5 % cream BID PRN    hydrOXYzine HCL tablet 25 mg TID PRN    insulin aspart U-100 pen 0-5 Units QID (AC + HS) PRN    insulin aspart U-100 pen 3 Units TIDWM    melatonin tablet 6 mg Nightly PRN    naloxone 0.4 mg/mL injection 0.02 mg PRN    pregabalin capsule 50 mg BID    prochlorperazine injection Soln 5 mg Q6H PRN    sertraline tablet 100 mg Daily    sodium chloride 0.9% flush 10 mL Q12H PRN    trazodone split tablet 25 mg QHS       Objective:     Vital Signs (Most Recent):  Temp: 98.3 °F (36.8 °C) (07/05/24 0425)  Pulse: 70 (07/05/24 0902)  Resp: 12 (07/05/24 0425)  BP: (!) 118/55 (07/05/24 0902)  SpO2: 99 % (07/05/24 0425) Vital Signs (24h Range):  Temp:  [97.6 °F (36.4 °C)-98.3 °F (36.8 °C)] 98.3 °F (36.8 °C)  Pulse:  [67-75] 70  Resp:  [12-16] 12  SpO2:  [95 %-100 %] 99 %  BP: ()/(55-86) 118/55     Weight: 80.5 kg (177 lb 7.5 oz) (07/01/24 0400)  Body mass index is 54.16 kg/m².  Body surface area is 1.65 meters squared.    I/O last 3 completed shifts:  In: 735 [P.O.:735]  Out: 50 [Urine:50]     Physical Exam  Vitals reviewed.   HENT:      Head: Normocephalic and atraumatic.   Eyes:      Conjunctiva/sclera: Conjunctivae normal.   Cardiovascular:      Rate and Rhythm: Normal  rate.   Pulmonary:      Effort: Pulmonary effort is normal. No respiratory distress.   Musculoskeletal:      Comments: Bilateral AKA. Mild LE edema.    Skin:     General: Skin is warm and dry.   Neurological:      Mental Status: Mental status is at baseline.   Psychiatric:         Mood and Affect: Mood normal.          Significant Labs:  CBC:   Recent Labs   Lab 07/03/24  0513   WBC 3.43*   RBC 2.77*   HGB 9.2*   HCT 28.2*      *   MCH 33.2*   MCHC 32.6     CMP:   Recent Labs   Lab 07/05/24  0807   *   CALCIUM 8.9   ALBUMIN 2.8*   *   K 5.4*   CO2 19*      BUN 53*   CREATININE 4.4*     All labs within the past 24 hours have been reviewed.     Assessment/Plan:     Renal/  ESRD (end stage renal disease)  58 y.o. Black or  Female ESRD-HD M-W-F presents to ED on 6/21/2024 with diagnosis of: Hypoglycemia [E16.2];Encephalopathy [G93.40];Chest pain [R07.9];Urinary tract infection with hematuria, site unspecified [N39.0, R31.9]   Nephrology consulted for inpatient ESRD-HD management    Outpatient HD Information:  -Dialysis modality: Hemodialysis  -Outpatient HD [place] [facility] [place]  -Nephrologist: ?  -HD TX days: Monday/Wednesday/Friday, duration of treatment: 3 hrs   -Last HD TX prior to hospital admission: 6/21/24  -Dialysis access: dialysis catheter   -Residual urine: Minium  -EDW: 67 kg     Assessment:   - Dialysis for metabolic clearance and volume management with Left Tunneled catheter.  - dropped blood pressure 106/65 mmHg during HD, UF decreased to 1 L x 3 hrs- then blood pressure stable at 130 /60  - Recommend holding blood pressure medicines prior to HD so patient may better tolerate HD with fluid removal   - New lab k 5.4  --- potassium bath decreased to 2 K bath from 3k bath.  - continue EPO 50 units with HD x 3 times a week.  - continue calcium acetate 667 TID  - continue calcitriol 0.5 mcg daily  - Recommend renal diet,   - Will continue iHD thrice weekly unless  emergent conditions arise.    Anemia of ESRD   Recent Labs   Lab 06/30/24  0657 07/03/24  0513   WBC 4.24 3.43*   HGB 10.0* 9.2*   HCT 32.3* 28.2*    197       Lab Results   Component Value Date    FESATURATED 35 06/22/2024    FERRITIN 757 (H) 06/22/2024       - Goal in ESRD is Hgb of 10-11. Near target.       Mineral Bone Disease in ESRD   Lab Results   Component Value Date    PTH 1,132 (H) 06/17/2024    CALCIUM 8.9 07/05/2024    ALBUMIN 2.8 (L) 07/05/2024    CAION 0.92 (L) 05/17/2023    PHOS 3.9 07/05/2024           Acute cystitis without hematuria  -    Oncology  Anemia due to chronic kidney disease, on chronic dialysis  See esrd    Orthopedic  S/P AKA (above knee amputation) bilateral  -        Thank you for your consult. I will follow-up with patient. Please contact us if you have any additional questions.    Trino Miner MD  Nephrology  [facility] - [facility]

## 2024-07-05 NOTE — PLAN OF CARE
[facility]      HOME HEALTH ORDERS  FACE TO FACE ENCOUNTER    Patient Name: Suyapa Connelly  YOB: 1965    PCP: No primary care provider on file.   PCP Address: No primary physician on file.  PCP Phone Number: None  PCP Fax: None    Encounter Date: 6/21/24    Admit to Home Health    Diagnoses:  Active Hospital Problems    Diagnosis  POA    *Acute encephalopathy [G93.40]  Yes    Severe obesity (BMI >= 40) [E66.01]  Yes     BMI 45      Chronic kidney disease-mineral and bone disorder [N18.9, E83.9, M89.9]  Yes    ESRD (end stage renal disease) [N18.6]  Yes    S/P AKA (above knee amputation) bilateral [Z89.611, Z89.612]  Not Applicable    Debility [R53.81]  Yes    Chronic combined systolic and diastolic heart failure [I50.42]  Yes    Hyponatremia [E87.1]  Yes    Paroxysmal atrial fibrillation [I48.0]  Yes    Anemia due to chronic kidney disease, on chronic dialysis [N18.6, D63.1, Z99.2]  Not Applicable    CAD (coronary artery disease) [I25.10]  Yes     Formatting of this note might be different from the original.  Last Assessment & Plan:   Formatting of this note might be different from the original.  -- Optimize glucose control.      Type 2 diabetes mellitus with hyperglycemia, with long-term current use of insulin [E11.65, Z79.4]  Not Applicable    Acute cystitis without hematuria [N30.00]  Yes    CAROLIN (generalized anxiety disorder) [F41.1]  Yes    Peripheral vascular disease [I73.9]  Yes      Resolved Hospital Problems   No resolved problems to display.       Follow Up Appointments:  No future appointments.    Allergies:  Review of patient's allergies indicates:   Allergen Reactions    Ciprofloxacin Itching    Iodine      Kidney injury    Pcn [penicillins]      Rash; tolerated ceftriaxone on 1/13/20       Medications: Review discharge medications with patient and family and provide education.    Current Facility-Administered Medications   Medication Dose Route Frequency Provider Last  Rate Last Admin    acetaminophen tablet 1,000 mg  1,000 mg Oral Q8H PRN Angela Bae, NP   1,000 mg at 07/04/24 2006    acetaminophen tablet 650 mg  650 mg Oral Q6H PRN Angela Bae NP   650 mg at 07/01/24 2220    allopurinol split tablet 50 mg  50 mg Oral Every other day Angela Bae, NP   50 mg at 07/04/24 1000    apixaban tablet 5 mg  5 mg Oral BID Angela Bae, NP   5 mg at 07/05/24 1157    atorvastatin tablet 80 mg  80 mg Oral Daily Angela Bae, NP   80 mg at 07/05/24 1157    barium sulfate (READI-CAT) suspension 450 mL  450 mL Oral ONCE PRN Haja Knapp MD        calcitRIOL capsule 0.5 mcg  0.5 mcg Oral Daily Angela Bae, NP   0.5 mcg at 07/05/24 1157    calcium acetate(phosphat bind) capsule 667 mg  667 mg Oral TID WM Angela Bae NP   667 mg at 07/05/24 1157    carvediloL tablet 6.25 mg  6.25 mg Oral BID Angela Bae NP   6.25 mg at 07/05/24 0900    clopidogreL tablet 75 mg  75 mg Oral Daily Angela Bae, NP   75 mg at 07/05/24 1157    dextrose 10% bolus 125 mL 125 mL  12.5 g Intravenous PRN nAgela Bae NP        dextrose 10% bolus 250 mL 250 mL  25 g Intravenous PRN Angela Bae, NP        diphenhydrAMINE capsule 25 mg  25 mg Oral Q6H PRN Viry Griffin PA-C   25 mg at 06/29/24 2212    diphenhydrAMINE injection 50 mg  50 mg Intramuscular On Call Procedure Viry Griffin PA-C        epoetin nina injection 4,000 Units  50 Units/kg Intravenous Every Mon, Wed, Fri Earnestine Morel PA-C   4,000 Units at 07/05/24 1058    famotidine tablet 20 mg  20 mg Oral Daily Angela Bae, NP   20 mg at 07/05/24 1157    furosemide tablet 40 mg  40 mg Oral Daily Viry Griffin PA-C   40 mg at 07/05/24 1157    glucagon (human recombinant) injection 1 mg  1 mg Intramuscular PRN Angela Bae, NP        glucose chewable tablet 16 g  16 g Oral PRN Angela Bae, NP        glucose chewable tablet 24 g  24 g Oral PRN Catalino,  Angela GANNON NP        heparin (porcine) injection 1,000 Units  1,000 Units Intra-Catheter PRN Earnestine Morel PA-C   1,000 Units at 07/05/24 1115    hydrALAZINE tablet 50 mg  50 mg Oral TID Angela Bae NP   50 mg at 07/04/24 2206    hydrocortisone 2.5 % cream   Topical (Top) BID PRN Viry Griffin PA-C        hydrOXYzine HCL tablet 25 mg  25 mg Oral TID PRN Viry Griffin PA-C   25 mg at 07/04/24 1011    insulin aspart U-100 pen 0-5 Units  0-5 Units Subcutaneous QID (AC + HS) PRN Angela Bae NP   2 Units at 07/02/24 1625    insulin aspart U-100 pen 3 Units  3 Units Subcutaneous TIDWM Viry Griffin PA-C   3 Units at 07/05/24 1130    melatonin tablet 6 mg  6 mg Oral Nightly PRN Angela Bae NP   6 mg at 06/30/24 2020    naloxone 0.4 mg/mL injection 0.02 mg  0.02 mg Intravenous PRN Angela Bae NP        pregabalin capsule 50 mg  50 mg Oral BID Rodríguez Pratt MD   50 mg at 07/05/24 1202    prochlorperazine injection Soln 5 mg  5 mg Intravenous Q6H PRN Angela Bae NP        sertraline tablet 100 mg  100 mg Oral Daily Viry Griffin PA-C   100 mg at 07/05/24 1202    sodium chloride 0.9% flush 10 mL  10 mL Intravenous Q12H PRN Angela Bae NP        trazodone split tablet 25 mg  25 mg Oral QHS Viry Griffin PA-C   25 mg at 07/04/24 2206     Current Discharge Medication List        START taking these medications    Details   blood-glucose meter kit Use as instructed  Qty: 1 each, Refills: 0      epoetin nina (PROCRIT) 4,000 unit/mL injection Inject 1 mL (4,000 Units total) into the skin every Mon, Wed, Fri.    Associated Diagnoses: Anemia due to chronic kidney disease, on chronic dialysis      pregabalin (LYRICA) 50 MG capsule Take 1 capsule (50 mg total) by mouth 2 (two) times daily as needed (neuropathic pain).  Qty: 60 capsule, Refills: 0           CONTINUE these medications which have CHANGED    Details   acetaminophen (TYLENOL) 500 MG tablet Take 2 tablets  (1,000 mg total) by mouth every 8 (eight) hours as needed for Pain.  Qty: 90 tablet, Refills: 0      allopurinoL (ZYLOPRIM) 100 MG tablet Take 0.5 tablets (50 mg total) by mouth every other day.  Qty: 8 tablet, Refills: 11      apixaban (ELIQUIS) 5 mg Tab Take 1 tablet (5 mg total) by mouth 2 (two) times daily.  Qty: 60 tablet, Refills: 11      atorvastatin (LIPITOR) 80 MG tablet Take 1 tablet (80 mg total) by mouth once daily.  Qty: 30 tablet, Refills: 11    Associated Diagnoses: Coronary artery disease, unspecified vessel or lesion type, unspecified whether angina present, unspecified whether native or transplanted heart      blood sugar diagnostic Strp Use to check blood glucose 2 (two) times a day.  Qty: 100 strip, Refills: 0      calcitRIOL (ROCALTROL) 0.5 MCG Cap Take 1 capsule (0.5 mcg total) by mouth once daily.  Qty: 30 capsule, Refills: 11      calcium acetate,phosphat bind, (PHOSLO) 667 mg capsule Take 1 capsule (667 mg total) by mouth 3 (three) times daily with meals.  Qty: 90 capsule, Refills: 11      carvediloL (COREG) 6.25 MG tablet Take 1 tablet (6.25 mg total) by mouth 2 (two) times daily.  Qty: 60 tablet, Refills: 11    Comments: .      clopidogreL (PLAVIX) 75 mg tablet Take 1 tablet (75 mg total) by mouth once daily.  Qty: 30 tablet, Refills: 11    Associated Diagnoses: S/P CABG x 1      famotidine (PEPCID) 20 MG tablet Take 1 tablet (20 mg total) by mouth once daily.  Qty: 30 tablet, Refills: 11      furosemide (LASIX) 40 MG tablet Take 1 tablet (40 mg total) by mouth once daily.  Qty: 30 tablet, Refills: 11      hydrALAZINE (APRESOLINE) 50 MG tablet Take 1 tablet (50 mg total) by mouth 3 (three) times daily.  Qty: 90 tablet, Refills: 11    Comments: .      insulin aspart, niacinamide, (FIASP FLEXTOUCH U-100 INSULIN) 100 unit/mL (3 mL) InPn Inject 3 Units into the skin 3 (three) times daily with meals.  Qty: 1 pen , Refills: 10      lancets Misc To check BG 3 times daily, to use with insurance  "preferred meter  Qty: 100 each, Refills: 3      pen needle, diabetic 32 gauge x 5/32" Ndle 1 Units by Misc.(Non-Drug; Combo Route) route before meals as needed (SSI).  Qty: 50 each, Refills: 3      sertraline (ZOLOFT) 100 MG tablet Take 1 tablet (100 mg total) by mouth once daily.  Qty: 30 tablet, Refills: 11    Associated Diagnoses: CAROLIN (generalized anxiety disorder)      sodium bicarbonate 650 MG tablet Take 1 tablet (650 mg total) by mouth 3 (three) times daily.  Qty: 90 tablet, Refills: 11      traZODone (DESYREL) 50 MG tablet Take 0.5 tablets (25 mg total) by mouth every evening.  Qty: 15 tablet, Refills: 11           CONTINUE these medications which have NOT CHANGED    Details   multivitamin (ONE DAILY MULTIVITAMIN) per tablet Take 1 tablet by mouth once daily.      wound dressings (TRIAD WOUND DRESSING) Pste Apply 2 g topically once daily.  Qty: 170 g, Refills: 0           STOP taking these medications       gabapentin (NEURONTIN) 100 MG capsule Comments:   Reason for Stopping:                 I have seen and examined this patient within the last 30 days. My clinical findings that support the need for the home health skilled services and home bound status are the following:no   Weakness/numbness causing balance and gait disturbance due to Weakness/Debility making it taxing to leave home.  Medical restrictions requiring assistance of another human to leave home due to  Dyspnea on exertion (SOB), Fluid/volume overload, and Frequent Falls.     Diet:   cardiac diet, diabetic diet 2000 calorie, and renal diet        Referrals/ Consults  Physical Therapy to evaluate and treat. Evaluate for home safety and equipment needs; Establish/upgrade home exercise program. Perform / instruct on therapeutic exercises, gait training, transfer training, and Range of Motion.  Occupational Therapy to evaluate and treat. Evaluate home environment for safety and equipment needs. Perform/Instruct on transfers, ADL training, ROM, and " therapeutic exercises.   to evaluate for community resources/long-range planning.  Aide to provide assistance with personal care, ADLs, and vital signs.    Activities:   activity as tolerated    Nursing:   Agency to admit patient within 24 hours of hospital discharge unless specified on physician order or at patient request    SN to complete comprehensive assessment including routine vital signs. Instruct on disease process and s/s of complications to report to MD. Review/verify medication list sent home with the patient at time of discharge  and instruct patient/caregiver as needed. Frequency may be adjusted depending on start of care date.     Skilled nurse to perform up to 3 visits PRN for symptoms related to diagnosis    Notify MD if SBP > 160 or < 90; DBP > 90 or < 50; HR > 120 or < 50; Temp > 101; O2 < 88%; Other:       Ok to schedule additional visits based on staff availability and patient request on consecutive days within the home health episode.    When multiple disciplines ordered:    Start of Care occurs on Sunday - Wednesday schedule remaining discipline evaluations as ordered on separate consecutive days following the start of care.    Thursday SOC -schedule subsequent evaluations Friday and Monday the following week.     Friday - Saturday SOC - schedule subsequent discipline evaluations on consecutive days starting Monday of the following week.    For all post-discharge communication and subsequent orders please contact patient's primary care physician. If unable to reach primary care physician or do not receive response within 30 minutes, please contact  for clinical staff order clarification    Miscellaneous   Routine Skin for Bedridden Patients: Instruct patient/caregiver to apply moisture barrier cream to all skin folds and wet areas in perineal area daily and after baths and all bowel movements.  Diabetic Care:   SN to perform and educate Diabetic management with blood glucose  monitoring:, Fingerstick blood sugar AC and HS, and Report CBG < 60 or > 350 to physician.    Home Health Aide:  Nursing Weekly, Physical Therapy Three times weekly, Occupational Therapy Three times weekly, Medical Social Work Weekly, and Home Health Aide Weekly    Wound Care Orders  Clean buttock/sacral area with bath wipes or soap/water; apply zinc barrier cream daily and prn. May cover with a silicone border foam.    I certify that this patient is confined to her home and needs intermittent skilled nursing care, physical therapy, and occupational therapy.

## 2024-07-05 NOTE — PLAN OF CARE
07/05/24 1400   Final Note   Assessment Type Final Discharge Note   Anticipated Discharge Disposition Home   Hospital Resources/Appts/Education Provided Provided patient/caregiver with written discharge plan information   Post-Acute Status   Post-Acute Authorization Home Health   Home Health Status Set-up Complete/Auth obtained   Patient choice form signed by patient/caregiver List with quality metrics by geographic area provided   Discharge Delays None known at this time     [facility]  Discharge Final Note    Primary Care Provider: No primary care provider on file.    Expected Discharge Date: 7/5/2024    [facility] , resumed HD with [facility]    Resumed HH orders faxed to [facility] and notified team    SW arranged ambulance transport via [facility]. Requested  time is 2:30 PM.  Requested  time does not guarantee arrival time.  If transport does not arrive by 6:00 pm please contact assigned SW or on-call for assistance.     Patient's bedside notified of the above.      Discharge Plan A and Plan B have been determined by review of patient's clinical status, future medical and therapeutic needs, and coverage/benefits for post-acute care in coordination with multidisciplinary team members.    Patient cleared for discharge from case management standpoint.       Final Discharge Note (most recent)       Final Note - 07/05/24 1400          Final Note    Assessment Type Final Discharge Note (P)      Anticipated Discharge Disposition Home or Self Care (P)      Hospital Resources/Appts/Education Provided Provided patient/caregiver with written discharge plan information (P)         Post-Acute Status    Post-Acute Authorization Home Health (P)      Home Health Status Set-up Complete/Auth obtained (P)      Patient choice form signed by patient/caregiver List with quality metrics by geographic area provided (P)      Discharge Delays None  known at this time (P)                      Important Message from Medicare  Important Message from Medicare regarding Discharge Appeal Rights: Given to patient/caregiver, Explained to patient/caregiver, Signed/date by patient/caregiver (Signature obtained late due to patient being in dialysis this morning)     Date IMM was signed: 07/05/24  Time IMM was signed: 1308    Contact Info       Feliciano Nguyen III, PA-C   Specialty: Internal Medicine    [address] [place] [id number]   Phone: [phone]       Next Steps: Follow up    Instructions: A message was sent to your PCP, the nurse will contact you to schedule this hospital follow up visit. However, if you do not hear from them within 24 to 48 hours of discharge, please call to schedule the appointment.            No future appointments.    SW scheduled post-discharge follow-up appointment and information added to AVS.     Valery Grewal, MSW  [facility]  Ext. [phone]

## 2024-07-05 NOTE — DISCHARGE SUMMARY
[facility] [address] [place] [address] Medicine  Discharge Summary      Patient Name: Suyapa Connelly  MRN: [id number]  CHAVEZ: [id number]  Patient Class: IP- Inpatient  Admission Date: 6/21/2024  Hospital Length of Stay: 13 days  Discharge Date and Time:  07/05/2024 1:48 PM  Attending Physician: Frances Bee MD   Discharging Provider: Frances Bee MD  Primary Care Provider: No primary care provider on file.  Hospital Medicine Team: [facility] [place] [place] HOSP MED O Frances Bee MD  Primary Care Team: [facility] [place] [place] HOSP MED O    HPI:   Suyapa Connelly is a 58 y.o. female with a PMHx of HTN, GERD, HLD, PVD, ESRD on HD MFW, b/l AKAs, CAD s/p CABG, afib on eliquis, depression, and anxiety who presents to the ED for evaluation of confusion. HPI and ROS limited secondary to patient's mental status and poor participation. Per patient's  she has been more sleepy and less interactive over the past several days. He reports that her urine has been foul-smelling and more cloudy. He states she was also peeing more frequently in complaining of burning with urination and some occasional suprapubic pain. Denies any fevers or chills. The patient denies CP, SOB, abdominal pain, or dysuria. Per  patient was dialyzed today a full dialysis and they came straight here after.     In the ED, VSS, afebrile. CBC with stable leukopenia and anemia. Na 129, Chloride 94, Bicarb 22, Glucose 50. POC glucose 150. Cr 2.7 (consistent with ESRD). Albumin 3.2. Tbili 1.1. Mag 2.1. UA with 3+ leukocytosis, 74 WBCs, but no bacteria. CTH with no acute finding or detrimental change when compared with 06/11/2024, allowing for motion and artifact limitations. The patient received 1g IV Rocephin.    * No surgery found *      Hospital Course:   Suyapa Connelly is a 58 y.o. F who was admitted to  for further evaluation of acute encephalopathy. Most likely 2/2 UTI, other workup negative. AAOx3 now. Initiated rocephin for infectious appearing UA. Following culture - grew  Candida krusei, stopped rocephin. ID consulted for recommendations. Recommend deferring treatment, obtain CT abd/pelvis. CT with findings of volume overload, otherwise no acute intraabdominal abnormality. Given x1 120mg IV lasix. Repeating UA with straight cath and following cx per ID recs. Nephrology following for HD management. Psych consulted for recommendations: continue zoloft, add trazodone qhs. CM assisting with discharge planning - NH declined admission.  DC home commenced with HH, DME, and close case management oversight.        Goals of Care Treatment Preferences:  Code Status: Full Code      Consults:   Consults (From admission, onward)          Status Ordering Provider     Inpatient consult to Registered Dietitian/Nutritionist  Once        Provider:  (Not yet assigned)    Completed MARY BETH PÉREZ     Inpatient consult to Psychiatry  Once        Provider:  (Not yet assigned)    Completed SUNITHA PETERSON     Inpatient consult to PICC team (\[facility]\"")  Once        Provider:  (Not yet assigned)    Completed ISREAL COLMENARES     Inpatient consult to PICC team (\[facility]\"")  Once        Provider:  (Not yet assigned)    Completed ISREAL COLMENARES     Inpatient consult to Infectious Diseases  Once        Provider:  (Not yet assigned)    Completed SUNITHA PETERSON     Inpatient consult to PICC team (\[facility]\"")  Once        Provider:  (Not yet assigned)    Completed ISREAL COLMENARES     Inpatient consult to Nephrology  Once        Provider:  (Not yet assigned)    Completed MARY BETH PÉREZ            No new Assessment & Plan notes have been filed under this hospital service since the last note was generated.  Service: Hospital Medicine    Final Active Diagnoses:    Diagnosis Date Noted POA    PRINCIPAL PROBLEM:  Acute encephalopathy [G93.40] 05/05/2018 Yes    Severe obesity (BMI >= 40) [E66.01] 05/14/2024 Yes    Chronic kidney disease-mineral and bone disorder [N18.9, E83.9, M89.9] 05/24/2023 Yes    ESRD (end stage renal disease)  [N18.6] 04/21/2023 Yes    S/P AKA (above knee amputation) bilateral [Z89.611, Z89.612] 03/26/2022 Not Applicable    Debility [R53.81] 02/12/2022 Yes    Chronic combined systolic and diastolic heart failure [I50.42] 06/21/2021 Yes    Hyponatremia [E87.1] 07/22/2020 Yes    Paroxysmal atrial fibrillation [I48.0] 01/28/2020 Yes    Anemia due to chronic kidney disease, on chronic dialysis [N18.6, D63.1, Z99.2] 01/27/2020 Not Applicable    CAD (coronary artery disease) [I25.10] 01/02/2020 Yes    Type 2 diabetes mellitus with hyperglycemia, with long-term current use of insulin [E11.65, Z79.4] 01/02/2020 Not Applicable    Acute cystitis without hematuria [N30.00] 06/02/2018 Yes    CAROLIN (generalized anxiety disorder) [F41.1] 05/07/2018 Yes    Peripheral vascular disease [I73.9] 10/06/2015 Yes      Problems Resolved During this Admission:       Discharged Condition: stable    Disposition: Home-Health Care [facility] [place]    Follow Up:   Follow-up Information       Feliciano Nguyen III, PA-C Follow up.    Specialty: Internal Medicine  Why: A message was sent to your PCP, the nurse will contact you to schedule this hospital follow up visit. However, if you do not hear from them within 24 to 48 hours of discharge, please call to schedule the appointment.  Contact information:  [address] [place] [address] [id number] [phone]                           Patient Instructions:      HOSPITAL BED FOR HOME USE     Order Specific Question Answer Comments   Type: Semi-electric    Length of need (1-99 months): 12    Does patient have medical equipment at home? wheelchair    Height: 4' (1.219 m)    Weight: 80.5 kg (177 lb 7.5 oz)    Accessories: Gel overlay mattress    Please check all that apply: Patient requires positioning of the body in ways not feasible in an ordinary bed due to a medical condition which is expected to last at least one month.      WHEELCHAIR FOR HOME USE     Order Specific Question Answer Comments   Hours in W/C per day: 8   "  Type of Wheelchair: Standard    Size(Width): 18"(STD adult)    Leg Support: STD footrests    Lap Belt: Velcro    Accessories: Anti-tippers    Cushion: Basic    Justification for cushion: Prevent pressure ulcers    Reclining Back No    Height: 4' (1.219 m)    Weight: 80.5 kg (177 lb 7.5 oz)    Does patient have medical equipment at home? wheelchair    Length of need (1-99 months): 12    Please check all that apply: Caregiver is capable and willing to operate wheelchair safely.    Please check all that apply: Patient's upper body strength is sufficient for propulsion.    Please check all that apply: Patient mobility limitations cannot be sufficiently resolved by the use of other ambulatory therapies.      COMMODE FOR HOME USE     Order Specific Question Answer Comments   Type: Drop arm    Type: body configuration    Height: 4' (1.219 m)    Weight: 80.5 kg (177 lb 7.5 oz)    Does patient have medical equipment at home? wheelchair    Length of need (1-99 months): 12      SLIDING BOARD FOR HOME USE     Order Specific Question Answer Comments   Height: 4' (1.219 m)    Weight: 80.5 kg (177 lb 7.5 oz)    Does patient have medical equipment at home? wheelchair    Length of need (1-99 months): 12    Reason for Sliding Board: Patient has a hospital bed    Need for transfer? Yes    Is the patient non-ambulatory? Yes    Is the patient chair bound? Yes    Is the patient bed bound? Yes      Ambulatory referral/consult to Acute Care at Home   Standing Status: Future   Referral Priority: Routine Referral Type: Consultation   Referred to Provider: DEVIKA PROVIDER Requested Specialty: Internal Medicine   Number of Visits Requested: 1     Diet renal     Notify your health care provider if you experience any of the following:  increased confusion or weakness     Notify your health care provider if you experience any of the following:  difficulty breathing or increased cough     Activity as tolerated       Significant Diagnostic " Studies: N/A    Pending Diagnostic Studies:       None           Medications:  Reconciled Home Medications:      Medication List        START taking these medications      blood-glucose meter kit  Use as instructed     epoetin nina 4,000 unit/mL injection  Commonly known as: PROCRIT  Inject 1 mL (4,000 Units total) into the skin every Mon, Wed, Fri.     pregabalin 50 MG capsule  Commonly known as: LYRICA  Take 1 capsule (50 mg total) by mouth 2 (two) times daily as needed (neuropathic pain).            CHANGE how you take these medications      acetaminophen 500 MG tablet  Commonly known as: TYLENOL  Take 2 tablets (1,000 mg total) by mouth every 8 (eight) hours as needed for Pain.  What changed:   how much to take  when to take this     famotidine 20 MG tablet  Commonly known as: PEPCID  Take 1 tablet (20 mg total) by mouth once daily.  What changed: when to take this     sodium bicarbonate 650 MG tablet  Take 1 tablet (650 mg total) by mouth 3 (three) times daily.  What changed: See the new instructions.            CONTINUE taking these medications      allopurinoL 100 MG tablet  Commonly known as: ZYLOPRIM  Take 0.5 tablets (50 mg total) by mouth every other day.     apixaban 5 mg Tab  Commonly known as: ELIQUIS  Take 1 tablet (5 mg total) by mouth 2 (two) times daily.     atorvastatin 80 MG tablet  Commonly known as: LIPITOR  Take 1 tablet (80 mg total) by mouth once daily.     blood sugar diagnostic Strp  Use to check blood glucose 2 (two) times a day.     calcitRIOL 0.5 MCG Cap  Commonly known as: ROCALTROL  Take 1 capsule (0.5 mcg total) by mouth once daily.     calcium acetate(phosphat bind) 667 mg capsule  Commonly known as: PHOSLO  Take 1 capsule (667 mg total) by mouth 3 (three) times daily with meals.     carvediloL 6.25 MG tablet  Commonly known as: COREG  Take 1 tablet (6.25 mg total) by mouth 2 (two) times daily.     clopidogreL 75 mg tablet  Commonly known as: PLAVIX  Take 1 tablet (75 mg total) by  "mouth once daily.     FIASP FLEXTOUCH U-100 INSULIN 100 unit/mL (3 mL) Inpn  Generic drug: insulin aspart (niacinamide)  Inject 3 Units into the skin 3 (three) times daily with meals.     furosemide 40 MG tablet  Commonly known as: LASIX  Take 1 tablet (40 mg total) by mouth once daily.     hydrALAZINE 50 MG tablet  Commonly known as: APRESOLINE  Take 1 tablet (50 mg total) by mouth 3 (three) times daily.     lancets Misc  To check BG 3 times daily, to use with insurance preferred meter     ONE DAILY MULTIVITAMIN per tablet  Generic drug: multivitamin  Take 1 tablet by mouth once daily.     pen needle, diabetic 32 gauge x 5/32" Ndle  1 Units by Misc.(Non-Drug; Combo Route) route before meals as needed (SSI).     sertraline 100 MG tablet  Commonly known as: ZOLOFT  Take 1 tablet (100 mg total) by mouth once daily.     traZODone 50 MG tablet  Commonly known as: DESYREL  Take 0.5 tablets (25 mg total) by mouth every evening.     TRIAD WOUND DRESSING Pste  Generic drug: wound dressings  Apply 2 g topically once daily.            STOP taking these medications      gabapentin 100 MG capsule  Commonly known as: NEURONTIN              Indwelling Lines/Drains at time of discharge:   Lines/Drains/Airways       Central Venous Catheter Line  Duration                  Hemodialysis Catheter left internal jugular -- days         Hemodialysis Catheter left subclavian -- days              Drain  Duration             Female External Urinary Catheter w/ Suction 06/22/24 0210 13 days                    Time spent on the discharge of patient: 35 minutes of time spent on discharge, including examining the patient, providing discharge instructions, arranging follow up, and documentation.            Frances Bee MD  Department of Hospital Medicine  [facility]  "

## 2024-07-08 ENCOUNTER — PATIENT OUTREACH (OUTPATIENT)
Dept: ADMINISTRATIVE | Facility: CLINIC | Age: 59
End: 2024-07-08
Payer: MEDICARE

## 2024-07-08 ENCOUNTER — OUTPATIENT CASE MANAGEMENT (OUTPATIENT)
Dept: ADMINISTRATIVE | Facility: OTHER | Age: 59
End: 2024-07-08
Payer: MEDICARE

## 2024-07-08 NOTE — PROGRESS NOTES
C3 nurse attempted to contact Suyapa Connelly for a TCC post hospital discharge follow up call. The patient is unable to conduct the call @ this time. The patient requested a callback later today.    The patient does not have a scheduled HOSFU appointment within 5-7 days post hospital discharge date 07/05/24 and does not have a PCP.

## 2024-07-08 NOTE — PROGRESS NOTES
"C3 nurse spoke with Suyapa Connelly's  Randell for a TCC post hospital discharge follow up call. The patient reports does not have a scheduled HOSFU appointment and does not have a PCP. Pt's  states the patient will have a PCP "in two days"       "

## 2024-08-12 PROBLEM — R65.20 SEVERE SEPSIS: Status: RESOLVED | Noted: 2022-01-17 | Resolved: 2024-08-12

## 2024-08-12 PROBLEM — A41.9 SEVERE SEPSIS: Status: RESOLVED | Noted: 2022-01-17 | Resolved: 2024-08-12

## 2024-09-30 ENCOUNTER — HOSPITAL ENCOUNTER (EMERGENCY)
Facility: HOSPITAL | Age: 59
Discharge: HOME | End: 2024-09-30
Attending: EMERGENCY MEDICINE
Payer: MEDICARE

## 2024-09-30 VITALS
HEART RATE: 85 BPM | DIASTOLIC BLOOD PRESSURE: 60 MMHG | OXYGEN SATURATION: 95 % | TEMPERATURE: 98 F | HEIGHT: 55 IN | RESPIRATION RATE: 13 BRPM | SYSTOLIC BLOOD PRESSURE: 122 MMHG | BODY MASS INDEX: 38.16 KG/M2 | WEIGHT: 164.88 LBS

## 2024-09-30 DIAGNOSIS — T87.89 PAIN OF AMPUTATION STUMP OF LEFT LOWER EXTREMITY: Primary | ICD-10-CM

## 2024-09-30 DIAGNOSIS — I70.209 ARTERIAL OCCLUSION, LOWER EXTREMITY: ICD-10-CM

## 2024-09-30 DIAGNOSIS — M79.605 PAIN OF AMPUTATION STUMP OF LEFT LOWER EXTREMITY: Primary | ICD-10-CM

## 2024-09-30 PROCEDURE — 25000003 PHARM REV CODE 250: Mod: HCNC | Performed by: EMERGENCY MEDICINE

## 2024-09-30 PROCEDURE — 99283 EMERGENCY DEPT VISIT LOW MDM: CPT | Mod: HCNC

## 2024-09-30 RX ORDER — OXYCODONE AND ACETAMINOPHEN 5; 325 MG/1; MG/1
1 TABLET ORAL
Status: COMPLETED | OUTPATIENT
Start: 2024-09-30 | End: 2024-09-30

## 2024-09-30 RX ORDER — OXYCODONE AND ACETAMINOPHEN 5; 325 MG/1; MG/1
1 TABLET ORAL EVERY 6 HOURS PRN
Qty: 12 TABLET | Refills: 0 | Status: SHIPPED | OUTPATIENT
Start: 2024-09-30 | End: 2024-10-03

## 2024-09-30 RX ORDER — OXYCODONE AND ACETAMINOPHEN 5; 325 MG/1; MG/1
1 TABLET ORAL EVERY 6 HOURS PRN
Qty: 12 TABLET | Refills: 0 | Status: SHIPPED | OUTPATIENT
Start: 2024-09-30 | End: 2024-09-30

## 2024-09-30 RX ADMIN — OXYCODONE HYDROCHLORIDE AND ACETAMINOPHEN 1 TABLET: 5; 325 TABLET ORAL at 07:09

## 2024-09-30 NOTE — ASSESSMENT & PLAN NOTE
This patient is a 58-year-old female with a complex history of peripheral vascular disease status post left and right above-knee amputation.  She is presenting with 5 days of crampy left stent pain, specifically at the medial aspect.  Her ipsilateral common femoral artery and profunda are patent.  She has a palpable pulse in the left groin.  There was no tissue loss.    Continue with anticoagulation and antiplatelet therapy.  Recommend multimodal analgesic regimen.  No indication for acute surgical intervention/revascularization at this time.

## 2024-09-30 NOTE — ED PROVIDER NOTES
Emergency Department Encounter  Provider Note    Suyapa Connelly  [id number]  9/30/2024    Evaluation:    History Acquisition:     Chief Complaint   Patient presents with    Transfer     Transfer From [place] for vascular surgery consult.        History of Present Illness:  Suyapa Connelly is a 58 y.o. female who has a past medical history of Anxiety, Chronic pain syndrome, CKD (chronic kidney disease), stage III, Depression, Diabetes mellitus, type 2, GERD (gastroesophageal reflux disease), Hyperemesis (03/23/2021), Hypokalemia (03/23/2021), Infection of below knee amputation stump (03/12/2022), Osteomyelitis, Osteomyelitis of left foot (04/30/2021), Ulcer of left foot, and Vaginal delivery.    The patient presents to the ED due to L leg pain.   Patient states pain started a few days ago but gradually got worse.  She has a history of stents and bypass in the left leg.  She has also had bilateral AKA.  She was seen in the ED and a CTA was worrisome for arterial occlusion.  She was transferred to the ER for vascular surgery evaluation.    EMS reports vitals stable EN route.  No meds given.    Additional historians utilized:  EMS report - see HPI    Prior medical records were reviewed:   Nephrology visit 07/31 for ESRD on dialysis  Admitted 06/2024 for acute encephalopathy  Admitted 05/2024 for acute encephalopathy    The patient's list of active medical history, family/social history, medications, and allergies as documented has been reviewed.     Past Medical History:   Diagnosis Date    Anxiety     Chronic pain syndrome     CKD (chronic kidney disease), stage III     Depression     Diabetes mellitus, type 2     GERD (gastroesophageal reflux disease)     Hyperemesis 03/23/2021    Hypokalemia 03/23/2021    Infection of below knee amputation stump 03/12/2022    Osteomyelitis     Osteomyelitis of left foot 04/30/2021    Ulcer of left foot     Vaginal delivery     x1     Past Surgical History:    Procedure Laterality Date    ABOVE-KNEE AMPUTATION Left 5/18/2021    Procedure: AMPUTATION, ABOVE KNEE;  Surgeon: Teddy Huber MD;  Location: [address];  Service: Vascular;  Laterality: Left;    ABOVE-KNEE AMPUTATION Right 3/18/2022    Procedure: AMPUTATION, ABOVE KNEE;  Surgeon: DAYNE Florez II, MD;  Location: [facility] OR [address] [place] [address];  Service: Vascular;  Laterality: Right;    Angiogram - Right Extremity Right 7/9/15    angiogram-left leg  10/6/15    ANGIOGRAPHY OF LOWER EXTREMITY Left 4/29/2021    Procedure: ANGIOGRAM, LOWER EXTREMITY;  Surgeon: Teddy Huber MD;  Location: [facility] OR [address] [place] [address];  Service: Vascular;  Laterality: Left;    BELOW KNEE AMPUTATION OF LOWER EXTREMITY Right 12/28/2021    Procedure: AMPUTATION, BELOW KNEE;  Surgeon: Kaitlyn Rojas MD;  Location: [facility];  Service: General;  Laterality: Right;    CATHETERIZATION OF BOTH LEFT AND RIGHT HEART N/A 12/18/2019    Procedure: CATHETERIZATION, HEART, BOTH LEFT AND RIGHT;  Surgeon: Que Fernando III, MD;  Location: [facility] CATH LAB;  Service: Cardiology;  Laterality: N/A;    CORONARY ANGIOGRAPHY N/A 12/18/2019    Procedure: ANGIOGRAM, CORONARY ARTERY;  Surgeon: Que Fernando III, MD;  Location: [facility] CATH LAB;  Service: Cardiology;  Laterality: N/A;    CORONARY ANGIOGRAPHY INCLUDING BYPASS GRAFTS WITH CATHETERIZATION OF LEFT HEART N/A 7/28/2020    Procedure: ANGIOGRAM, CORONARY, INCLUDING BYPASS GRAFT, WITH LEFT HEART CATHETERIZATION, 9 am;  Surgeon: Rachel Easley MD;  [facility] CATH LAB;  Service: Cardiology;  Laterality: N/A;    CORONARY ARTERY BYPASS GRAFT (CABG) N/A 1/14/2020    Procedure: CORONARY ARTERY BYPASS GRAFT (CABG) x 1     Off Pump;  Surgeon: Huang Altamirano MD;  Location: [address];  Service: Cardiovascular;  Laterality: N/A;    CREATION OF FEMORAL-TIBIAL ARTERY BYPASS Left 4/29/2021    Procedure: CREATION, BYPASS, ARTERIAL, FEMORAL TO ANTERIOR TIBIAL;  Surgeon: Teddy BLANCA  MD Cecile;  [facility];  Service: Vascular;  Laterality: Left;    CREATION OF FEMOROPOPLITEAL ARTERIAL BYPASS USING GRAFT Left 8/18/2020    Procedure: CREATION, BYPASS, ARTERIAL, FEMORAL TO POPLITEAL, USING GRAFT, LEFT LOWER EXTREMITY;  Surgeon: Teddy Huber MD;  Location: [facility];  Service: Vascular;  Laterality: Left;  REQUEST 7:15 A.M. START----COVID NEGATIVE ON 8/17  1ST CASE STARTE PER LEANA ON 8/7/2020 @ 942AM-  RN PREOP 8/12/2020   T/S-----CLEARED BY CARDS-------PENDING INSURANCE    DEBRIDEMENT OF FOOT Left 9/8/2020    Procedure: DEBRIDEMENT, FOOT;  Surgeon: Rosio Mayes DPM;  Location: [facility];  Service: Podiatry;  Laterality: Left;  request neoxx .   RN Pre Op 9-4-2020, Covid negative on 9/5/20. C A    DEBRIDEMENT OF FOOT  3/4/2021    Procedure: DEBRIDEMENT, FOOT;  Surgeon: Teddy Huber MD;  Location: [facility] OR;  Service: Vascular;;    DEBRIDEMENT OF FOOT Left 3/9/2021    Procedure: DEBRIDEMENT, FOOT, bone biopsy;  Surgeon: Rosio Mayes DPM;  Location: [facility] OR;  Service: Podiatry;  Laterality: Left;  Request neoxx---COVID IN AM  REQUESTING NOON START  RN Phone Pre op.On Blood thinners Plavix and Eliquis.  Covid am of surgery. C A    DEBRIDEMENT OF FOOT Left 5/4/2021    Procedure: DEBRIDEMENT, FOOT;  Surgeon: Farooq Morley DPM;  Location: [facility] 2ND FLR;  Service: Podiatry;  Laterality: Left;    INSERTION OF TUNNELED CENTRAL VENOUS HEMODIALYSIS CATHETER N/A 1/27/2020    Procedure: Insertion, Catheter, Central Venous, Hemodialysis;  Surgeon: ESTEBAN Gomez III, MD;  Location: [facility] CATH LAB;  Service: Peripheral Vascular;  Laterality: N/A;    INSERTION OF TUNNELED CENTRAL VENOUS HEMODIALYSIS CATHETER  5/10/2023    Procedure: Insertion, Catheter, Central Venous, Hemodialysis;  Surgeon: Romulo Queen MD;  Location: [facility] CATH LAB;  Service: Cardiology;;    PERCUTANEOUS TRANSLUMINAL ANGIOPLASTY N/A 3/4/2021    Procedure: PTA (ANGIOPLASTY, PERCUTANEOUS,  TRANSLUMINAL);  Surgeon: Teddy Huber MD;  Location: [facility] OR;  Service: Vascular;  Laterality: N/A;    REMOVAL OF ARTERIOVENOUS GRAFT Left 5/27/2021    Procedure: REMOVAL, GRAFT, LEFT LOWER EXTREMITY, WOUND EXPLORATION;  Surgeon: Teddy Huber MD;  Location: [facility] OR 2ND FLR;  Service: Vascular;  Laterality: Left;    REMOVAL OF NAIL OF DIGIT Left 3/9/2021    Procedure: REMOVAL, NAIL, DIGIT;  Surgeon: Rosio Mayes DPM;  Location: [facility] OR;  Service: Podiatry;  Laterality: Left;    RIGHT HEART CATHETERIZATION Right 5/10/2023    Procedure: INSERTION, CATHETER, RIGHT HEART;  Surgeon: Romulo Queen MD;  Location: [facility] CATH LAB;  Service: Cardiology;  Laterality: Right;    THROMBECTOMY Left 3/4/2021    Procedure: THROMBECTOMY, LEFT LOWER EXTREMITY BYPASS GRAFT, ANGIOGRAM, POSSIBLE INTERVENTION, POSSIBLE LEFT LOWER EXTREMITY BYPASS;  Surgeon: Teddy Huber MD;  Location: [facility] OR;  Service: Vascular;  Laterality: Left;    THROMBECTOMY Left 4/29/2021    Procedure: GRAFT THROMBECTOMY, LEFT LOWER EXTREMITY;  Surgeon: Teddy Huber MD;  Location: [facility] OR 2ND FLR;  Service: Vascular;  Laterality: Left;  14.5 min  1179.85 mGy  341.01 Gycm2  240 ml dye    THROMBECTOMY  10/22/2021    Procedure: THROMBECTOMY;  Surgeon: Saad Arenas MD;  Location: [facility] CATH LAB/EP;  Service: Cardiology;;     Family History   Problem Relation Name Age of Onset    Diabetes Mother      Diabetes Father      Heart disease Maternal Grandmother      No Known Problems Maternal Grandfather      Diabetes Paternal Grandmother      No Known Problems Paternal Grandfather      Anesthesia problems Neg Hx       Social History     Socioeconomic History    Marital status:    Occupational History    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      Social Drivers of Health     Financial Resource Strain: Patient Declined (6/22/2024)    Overall Financial Resource Strain (CARDIA)     Difficulty of Paying Living Expenses: Patient declined   Food Insecurity: Patient Declined (6/22/2024)    Hunger Vital Sign     Worried About Running Out of Food in the Last Year: Patient declined     Ran Out of Food in the Last Year: Patient declined   Transportation Needs: No Transportation Needs (6/22/2024)    TRANSPORTATION NEEDS     Transportation : No   Physical Activity: Inactive (6/22/2024)    Exercise Vital Sign     Days of Exercise per Week: 0 days     Minutes of Exercise per Session: 0 min   Stress: Patient Declined (6/22/2024)    [employer] - Occupational Stress Questionnaire     Feeling of Stress : Patient declined   Housing Stability: Patient Declined (6/22/2024)    Housing Stability Vital Sign     Unable to Pay for Housing in the Last Year: Patient declined     Homeless in the Last Year: Patient declined       Medications:  Current Discharge Medication List        START taking these medications    Details   oxyCODONE-acetaminophen (PERCOCET) 5-325 mg per tablet Take 1 tablet by mouth every 6 (six) hours as needed for Pain (severe pain).  Qty: 12 tablet, Refills: 0    Comments: Quantity prescribed more than 7 day supply? No  Associated Diagnoses: Pain of amputation stump of left lower extremity; Arterial occlusion, lower extremity           CONTINUE these medications which have NOT CHANGED    Details   acetaminophen (TYLENOL) 500 MG tablet Take 2 tablets (1,000 mg total) by mouth every 8 (eight) hours as needed for Pain.  Qty: 90 tablet, Refills: 0      allopurinoL (ZYLOPRIM) 100 MG tablet Take 0.5 tablets (50 mg total) by mouth every other day.  Qty: 8 tablet, Refills: 11      apixaban (ELIQUIS) 5 mg Tab Take 1 tablet (5 mg  total) by mouth 2 (two) times daily.  Qty: 60 tablet, Refills: 11      atorvastatin (LIPITOR) 80 MG tablet Take 1 tablet (80 mg total) by mouth once daily.  Qty: 30 tablet, Refills: 11    Associated Diagnoses: Coronary artery disease, unspecified vessel or lesion type, unspecified whether angina present, unspecified whether native or transplanted heart      blood sugar diagnostic Strp Use to check blood glucose 2 (two) times a day.  Qty: 100 strip, Refills: 0      blood-glucose meter Misc TEST TWICE DAILY  Qty: 1 each, Refills: 0      calcitRIOL (ROCALTROL) 0.5 MCG Cap Take 1 capsule (0.5 mcg total) by mouth once daily.  Qty: 30 capsule, Refills: 11      calcium acetate,phosphat bind, (PHOSLO) 667 mg capsule Take 1 capsule (667 mg total) by mouth 3 (three) times daily with meals.  Qty: 90 capsule, Refills: 11      carvediloL (COREG) 6.25 MG tablet Take 1 tablet (6.25 mg total) by mouth 2 (two) times daily.  Qty: 60 tablet, Refills: 11    Comments: .      clopidogreL (PLAVIX) 75 mg tablet Take 1 tablet (75 mg total) by mouth once daily.  Qty: 30 tablet, Refills: 11    Associated Diagnoses: S/P CABG x 1      epoetin nina (PROCRIT) 4,000 unit/mL injection Inject 1 mL (4,000 Units total) into the skin every Mon, Wed, Fri.    Associated Diagnoses: Anemia due to chronic kidney disease, on chronic dialysis      famotidine (PEPCID) 20 MG tablet Take 1 tablet (20 mg total) by mouth once daily.  Qty: 30 tablet, Refills: 11      furosemide (LASIX) 40 MG tablet Take 1 tablet (40 mg total) by mouth once daily.  Qty: 30 tablet, Refills: 11      hydrALAZINE (APRESOLINE) 50 MG tablet Take 1 tablet (50 mg total) by mouth 3 (three) times daily.  Qty: 90 tablet, Refills: 11    Comments: .      hydrOXYzine HCL (ATARAX) 25 MG tablet Take 1 tablet (25 mg total) by mouth nightly as needed for Itching.  Qty: 30 tablet, Refills: 0      insulin aspart U-100 (NOVOLOG) 100 unit/mL (3 mL) InPn pen Inject 3 Units into the skin 3 (three) times  "daily with meals.  Qty: 3 mL, Refills: 10      lancets 33 gauge Misc TEST 3 TIMES DAILY  Qty: 100 each, Refills: 3      multivitamin (ONE DAILY MULTIVITAMIN) per tablet Take 1 tablet by mouth once daily.      pen needle, diabetic 32 gauge x 5/32" Ndle 1 Units by Misc.(Non-Drug; Combo Route) route before meals as needed (SSI).  Qty: 100 each, Refills: 3      pregabalin (LYRICA) 50 MG capsule Take 1 capsule (50 mg total) by mouth 2 (two) times daily as needed (neuropathic pain).  Qty: 60 capsule, Refills: 0      sertraline (ZOLOFT) 100 MG tablet Take 1 tablet (100 mg total) by mouth once daily.  Qty: 30 tablet, Refills: 11    Associated Diagnoses: CAROLIN (generalized anxiety disorder)      sodium bicarbonate 650 MG tablet Take 1 tablet (650 mg total) by mouth 3 (three) times daily.  Qty: 90 tablet, Refills: 11      traZODone (DESYREL) 50 MG tablet Take 0.5 tablets (25 mg total) by mouth every evening.  Qty: 15 tablet, Refills: 11      wound dressings (TRIAD WOUND DRESSING) Pste Apply 2 g topically once daily.  Qty: 170 g, Refills: 0             Allergies:  Review of patient's allergies indicates:   Allergen Reactions    Ciprofloxacin Itching    Iodine      Kidney injury    Pcn [penicillins]      Rash; tolerated ceftriaxone on 1/13/20       Review of Systems   Musculoskeletal:  Positive for arthralgias and myalgias.         Physical Exam:     Initial Vitals [09/30/24 1614]   BP Pulse Resp Temp SpO2   (!) 147/71 82 18 98.4 °F (36.9 °C) 100 %      MAP       --         Physical Exam    Nursing note and vitals reviewed.  Constitutional: She appears well-developed and well-nourished. She is not diaphoretic. No distress.   HENT:   Head: Normocephalic and atraumatic. Mouth/Throat: Oropharynx is clear and moist.   Eyes: EOM are normal. Pupils are equal, round, and reactive to light.   Neck: No tracheal deviation present.   Cardiovascular:  Normal rate, regular rhythm, normal heart sounds and intact distal pulses.         "   Pulmonary/Chest: Breath sounds normal. No stridor. No respiratory distress. She has no wheezes.   Abdominal: Abdomen is soft. Bowel sounds are normal. She exhibits no distension and no mass. There is no abdominal tenderness.   Musculoskeletal:         General: No edema. Normal range of motion.        Legs:       Comments: Bilateral ROSLYN.  Mild tenderness along the left thigh and scar tissue along distal stump incision.  No skin breakdown, bleeding, drainage, or fluctuance appreciated.     Neurological: She is alert and oriented to person, place, and time. She has normal strength. No cranial nerve deficit or sensory deficit.   Skin: Skin is warm and dry. Capillary refill takes less than 2 seconds. No pallor.   Psychiatric: She has a normal mood and affect. Her behavior is normal. Thought content normal.       Differential Diagnoses:   Based on available information and initial assessment, Differential Diagnosis includes, but is not limited to:  Fracture, dislocation, compartment syndrome, nerve injury/palsy, vascular injury, DVT, rhabdomyolysis, hemarthrosis, septic joint, cellulitis, bursitis, muscle strain, ligament tear/sprain, laceration, foreign body, abrasion, soft tissue contusion, osteoarthritis.      ED Management:   Procedures    Orders Placed This Encounter    Inpatient consult to Vascular Surgery    oxyCODONE-acetaminophen 5-325 mg per tablet 1 tablet    oxyCODONE-acetaminophen (PERCOCET) 5-325 mg per tablet          EKG:       Labs:   Labs Reviewed - No data to display  Independent review of the labs ordered include:   See ED course    Imaging:     Imaging Results    None            Medications Given:     Medications   oxyCODONE-acetaminophen 5-325 mg per tablet 1 tablet (1 tablet Oral Given 9/30/24 1943)        Medical Decision Making:    Additional Consideration:   Additional testing considered during clinical course: none    Social determinants of health considered during development of treatment  "plan include: poor access to care    Current co-morbidities considered which impacted clinical decision making: ESRD on HD, bilateral AKA    Case discussed with additional provider: vascular Surgery, Dr. Lucero, recommends non-surgical management     ED Course as of 09/30/24 2144   Mon Sep 30, 2024   1636 SpO2: 100 % [SS]   1636 Resp: 18 [SS]   1636 Pulse: 82 [SS]   1636 Temp Source: Oral [SS]   1636 Temp: 98.4 °F (36.9 °C) [SS]   1636 BP(!): 147/71  Vitals reassuring [SS]   1636 Discussed patient with Dr. Lucero, vascular surgery, who is on the way to the ED and will evaluate. [SS]      ED Course User Index  [SS] Vivek Stringer MD            Medical Decision Making  59 yo F transferred to ED for vascular surgery evaluation.    After vascular surgery assessment, there recommendations include "anticoagulation and antiplatelet therapy. Recommend multimodal analgesic regimen. No indication for acute surgical intervention/revascularization at this time."    Patient is already on Eliquis, Plavix, Lyrica. Will discharge patient with pain medication, outpatient follow-up.          Problems Addressed:  Arterial occlusion, lower extremity: chronic illness or injury  Pain of amputation stump of left lower extremity: acute illness or injury    Amount and/or Complexity of Data Reviewed  Independent Historian: EMS  External Data Reviewed: notes.  Labs:  Decision-making details documented in ED Course.  Radiology: independent interpretation performed.    Risk  Prescription drug management.  Diagnosis or treatment significantly limited by social determinants of health.        Clinical Impression:       ICD-10-CM ICD-9-CM   1. Pain of amputation stump of left lower extremity  T87.89 997.69    M79.605 729.5   2. Arterial occlusion, lower extremity  I70.209 444.22       Discharge Medications:  Current Discharge Medication List        START taking these medications    Details   oxyCODONE-acetaminophen (PERCOCET) 5-325 mg per tablet " Take 1 tablet by mouth every 6 (six) hours as needed for Pain (severe pain).  Qty: 12 tablet, Refills: 0    Comments: Quantity prescribed more than 7 day supply? No  Associated Diagnoses: Pain of amputation stump of left lower extremity; Arterial occlusion, lower extremity               Follow-up Information       Follow up With Specialties Details Why Contact Info    Your Primary Care Provider  Schedule an appointment as soon as possible for a visit   as soon as able             ED Disposition Condition    Discharge Stable              On re-evaluation, the patient's status has improved.  PCP/HD follow-up as soon as possible was recommended.    After taking into careful account the patient's history, physical exam findings, as well as empirical and objective data obtained throughout ED workup, I feel no emergent medical condition has been identified. No further evaluation or admission was felt to be required, and the patient is stable for discharge from the ED. The patient and any additional family present were updated with test results, overall clinical impression, and recommended further plan of care, including discharge instructions as provided and outpatient follow-up for continued evaluation and management as needed. All questions were answered. The patient expressed understanding and agreed with current plan for discharge and follow-up plan of care. Strict ED return precautions were provided, including return/worsening of current symptoms, new symptoms, or any other concerns.       Vivek Stringer MD  09/30/24 [id number]

## 2024-09-30 NOTE — SUBJECTIVE & OBJECTIVE
(Not in a hospital admission)      Review of patient's allergies indicates:   Allergen Reactions    Ciprofloxacin Itching    Iodine      Kidney injury    Pcn [penicillins]      Rash; tolerated ceftriaxone on 1/13/20       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    Osteomyelitis     Osteomyelitis of left foot 04/30/2021    Ulcer of left foot     Vaginal delivery     x1     Past Surgical History:   Procedure Laterality Date    ABOVE-KNEE AMPUTATION Left 5/18/2021    Procedure: AMPUTATION, ABOVE KNEE;  Surgeon: Teddy Huber MD;  Location: [facility] OR [address] [place] [address];  Service: Vascular;  Laterality: Left;    ABOVE-KNEE AMPUTATION Right 3/18/2022    Procedure: AMPUTATION, ABOVE KNEE;  Surgeon: DAYNE Florez II, MD;  Location: [facility] OR [address] [place] [address];  Service: Vascular;  Laterality: Right;    Angiogram - Right Extremity Right 7/9/15    angiogram-left leg  10/6/15    ANGIOGRAPHY OF LOWER EXTREMITY Left 4/29/2021    Procedure: ANGIOGRAM, LOWER EXTREMITY;  Surgeon: Teddy Huber MD;  Location: [facility] OR [address] [place] [address];  Service: Vascular;  Laterality: Left;    BELOW KNEE AMPUTATION OF LOWER EXTREMITY Right 12/28/2021    Procedure: AMPUTATION, BELOW KNEE;  Surgeon: Kaitlyn Rojas MD;  Location: [facility] OR;  Service: General;  Laterality: Right;    CATHETERIZATION OF BOTH LEFT AND RIGHT HEART N/A 12/18/2019    Procedure: CATHETERIZATION, HEART, BOTH LEFT AND RIGHT;  Surgeon: Que Fernando III, MD;  Location: [facility] CATH LAB;  Service: Cardiology;  Laterality: N/A;    CORONARY ANGIOGRAPHY N/A 12/18/2019    Procedure: ANGIOGRAM, CORONARY ARTERY;  Surgeon: Que Fernando III, MD;  Location: [facility] CATH LAB;  Service: Cardiology;  Laterality: N/A;    CORONARY  ANGIOGRAPHY INCLUDING BYPASS GRAFTS WITH CATHETERIZATION OF LEFT HEART N/A 7/28/2020    Procedure: ANGIOGRAM, CORONARY, INCLUDING BYPASS GRAFT, WITH LEFT HEART CATHETERIZATION, 9 am;  Surgeon: Rachel Easley MD;  Location: [facility] CATH LAB;  Service: Cardiology;  Laterality: N/A;    CORONARY ARTERY BYPASS GRAFT (CABG) N/A 1/14/2020    Procedure: CORONARY ARTERY BYPASS GRAFT (CABG) x 1     Off Pump;  Surgeon: Huang Altamirano MD;  Location: [facility] OR [address] [place] [address];  Service: Cardiovascular;  Laterality: N/A;    CREATION OF FEMORAL-TIBIAL ARTERY BYPASS Left 4/29/2021    Procedure: CREATION, BYPASS, ARTERIAL, FEMORAL TO ANTERIOR TIBIAL;  Surgeon: Teddy Huber MD;  Location: [facility] OR [address] [place] [address];  Service: Vascular;  Laterality: Left;    CREATION OF FEMOROPOPLITEAL ARTERIAL BYPASS USING GRAFT Left 8/18/2020    Procedure: CREATION, BYPASS, ARTERIAL, FEMORAL TO POPLITEAL, USING GRAFT, LEFT LOWER EXTREMITY;  Surgeon: Teddy Huber MD;  Location: [facility] OR;  Service: Vascular;  Laterality: Left;  REQUEST 7:15 A.M. START----COVID NEGATIVE ON 8/17  1ST CASE STARTKENYA DUEÑAS ON 8/7/2020 @ 942AM-LO  RN PREOP 8/12/2020   T/S-----CLEARED BY CARDS-------PENDING INSURANCE    DEBRIDEMENT OF FOOT Left 9/8/2020    Procedure: DEBRIDEMENT, FOOT;  Surgeon: Rosio Mayes DPM;  Location: [facility] OR;  Service: Podiatry;  Laterality: Left;  request neoxx .   RN Pre Op 9-4-2020, Covid negative on 9/5/20. C A    DEBRIDEMENT OF FOOT  3/4/2021    Procedure: DEBRIDEMENT, FOOT;  Surgeon: Teddy Huber MD;  Location: [facility] OR;  Service: Vascular;;    DEBRIDEMENT OF FOOT Left 3/9/2021    Procedure: DEBRIDEMENT, FOOT, bone biopsy;  Surgeon: Rosio Mayes DPM;  Location: [facility] OR;  Service: Podiatry;  Laterality: Left;  Request neoxx---COVID IN AM  REQUESTING NOON START  RN Phone Pre op.On Blood thinners Plavix and Eliquis.  Covid am of surgery. C A    DEBRIDEMENT OF FOOT Left 5/4/2021    Procedure: DEBRIDEMENT, FOOT;  Surgeon: Farooq WANG  EDUARDO Morley;  Location: [facility] 2ND FLR;  Service: Podiatry;  Laterality: Left;    INSERTION OF TUNNELED CENTRAL VENOUS HEMODIALYSIS CATHETER N/A 1/27/2020    Procedure: Insertion, Catheter, Central Venous, Hemodialysis;  Surgeon: ESTBEAN Gomez III, MD;  Location: [facility] CATH LAB;  Service: Peripheral Vascular;  Laterality: N/A;    INSERTION OF TUNNELED CENTRAL VENOUS HEMODIALYSIS CATHETER  5/10/2023    Procedure: Insertion, Catheter, Central Venous, Hemodialysis;  Surgeon: Romulo Queen MD;  Location: [facility] CATH LAB;  Service: Cardiology;;    PERCUTANEOUS TRANSLUMINAL ANGIOPLASTY N/A 3/4/2021    Procedure: PTA (ANGIOPLASTY, PERCUTANEOUS, TRANSLUMINAL);  Surgeon: Teddy Huber MD;  Location: [facility];  Service: Vascular;  Laterality: N/A;    REMOVAL OF ARTERIOVENOUS GRAFT Left 5/27/2021    Procedure: REMOVAL, GRAFT, LEFT LOWER EXTREMITY, WOUND EXPLORATION;  Surgeon: Teddy Huber MD;  Location: [address] FLR;  Service: Vascular;  Laterality: Left;    REMOVAL OF NAIL OF DIGIT Left 3/9/2021    Procedure: REMOVAL, NAIL, DIGIT;  Surgeon: Rosio Mayes DPM;  Location: [facility];  Service: Podiatry;  Laterality: Left;    RIGHT HEART CATHETERIZATION Right 5/10/2023    Procedure: INSERTION, CATHETER, RIGHT HEART;  Surgeon: Romulo Queen MD;  Location: [facility] CATH LAB;  Service: Cardiology;  Laterality: Right;    THROMBECTOMY Left 3/4/2021    Procedure: THROMBECTOMY, LEFT LOWER EXTREMITY BYPASS GRAFT, ANGIOGRAM, POSSIBLE INTERVENTION, POSSIBLE LEFT LOWER EXTREMITY BYPASS;  Surgeon: Teddy Huber MD;  Location: [facility] OR;  Service: Vascular;  Laterality: Left;    THROMBECTOMY Left 4/29/2021    Procedure: GRAFT THROMBECTOMY, LEFT LOWER EXTREMITY;  Surgeon: Teddy Huber MD;  Location: [facility] OR 2ND FLR;  Service: Vascular;  Laterality: Left;  14.5 min  1179.85 mGy  341.01 Gycm2  240 ml dye    THROMBECTOMY  10/22/2021    Procedure: THROMBECTOMY;  Surgeon: Saad Arenas MD;  Location: [facility] CATH  LAB/EP;  Service: Cardiology;;     Family History       Problem Relation (Age of Onset)    Diabetes Mother, Father, Paternal Grandmother    Heart disease Maternal Grandmother    No Known Problems Maternal Grandfather, Paternal Grandfather          Tobacco Use    Smoking status: Former    Smokeless tobacco: Never   Substance and Sexual Activity    Alcohol use: No    Drug use: Yes     Types: Marijuana     Comment: occassional    Sexual activity: Yes     Partners: Male     Review of Systems   All other systems reviewed and are negative.    Objective:     Vital Signs (Most Recent):  Temp: 98.4 °F (36.9 °C) (09/30/24 1614)  Pulse: 82 (09/30/24 1614)  Resp: 18 (09/30/24 1614)  BP: (!) 147/71 (09/30/24 1614)  SpO2: 100 % (09/30/24 1614) Vital Signs (24h Range):  Temp:  [97.8 °F (36.6 °C)-98.4 °F (36.9 °C)] 98.4 °F (36.9 °C)  Pulse:  [74-82] 82  Resp:  [16-20] 18  SpO2:  [95 %-100 %] 100 %  BP: (118-150)/(59-76) 147/71     Weight: 74.8 kg (164 lb 14.5 oz)  Body mass index is 50.32 kg/m².      Physical Exam  Constitutional:       General: She is not in acute distress.  HENT:      Head: Normocephalic.      Mouth/Throat:      Mouth: Mucous membranes are moist.   Cardiovascular:      Rate and Rhythm: Normal rate.   Abdominal:      General: Abdomen is flat.   Musculoskeletal:      Cervical back: Neck supple.   Neurological:      General: No focal deficit present.      Mental Status: She is alert.   Psychiatric:         Judgment: Judgment normal.          Significant Labs:  All pertinent labs from the last 24 hours have been reviewed.    Significant Diagnostics:  I have reviewed all pertinent imaging results/findings within the past 24 hours.

## 2024-09-30 NOTE — HPI
This patient is a 58-year-old female presenting as a transfer from outside hospital for left stump pain.  She specifically describes pain at the medial aspect, for the last 5 days, crampy in nature.  She is currently on gabapentin for neuropathic pain of the right stump but she states that this is worse.  She is status post right and left above knee amputation (March/2022, May/2021) after multiple failed vascular interventions and nonhealing tissue loss.  At the outside hospital, an arterial duplex was obtained which showed occluded left SFA but patent common femoral artery and profunda.  Vascular surgery was consulted for further evaluation. No tissue loss. Maintained on Eliquis, Plavix. Non smoker. Non ambulatory. Denies additional symptoms.

## 2024-09-30 NOTE — CONSULTS
[facility] - Emergency Dept  Vascular Surgery  Consult Note    Inpatient consult to Vascular Surgery  Consult performed by: Jesus Lucero MD  Consult ordered by: Vivek Stringer MD        Subjective:     Chief Complaint/Reason for Admission: L stump pain    History of Present Illness: This patient is a 58-year-old female presenting as a transfer from outside hospital for left stump pain.  She specifically describes pain at the medial aspect, for the last 5 days, crampy in nature.  She is currently on gabapentin for neuropathic pain of the right stump but she states that this is worse.  She is status post right and left above knee amputation (March/2022, May/2021) after multiple failed vascular interventions and nonhealing tissue loss.  At the outside hospital, an arterial duplex was obtained which showed occluded left SFA but patent common femoral artery and profunda.  Vascular surgery was consulted for further evaluation. No tissue loss. Maintained on Eliquis, Plavix. Non smoker. Non ambulatory. Denies additional symptoms.     (Not in a hospital admission)      Review of patient's allergies indicates:   Allergen Reactions    Ciprofloxacin Itching    Iodine      Kidney injury    Pcn [penicillins]      Rash; tolerated ceftriaxone on 1/13/20       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    Osteomyelitis     Osteomyelitis of left foot 04/30/2021    Ulcer of left foot     Vaginal delivery     x1     Past Surgical History:   Procedure Laterality Date    ABOVE-KNEE AMPUTATION Left 5/18/2021    Procedure: AMPUTATION, ABOVE KNEE;  Surgeon: Teddy Huber MD;  Location: [facility] OR [address] [place] [address];  Service: Vascular;  Laterality: Left;    ABOVE-KNEE  AMPUTATION Right 3/18/2022    Procedure: AMPUTATION, ABOVE KNEE;  Surgeon: DAYNE Florez II, MD;  [facility];  Service: Vascular;  Laterality: Right;    Angiogram - Right Extremity Right 7/9/15    angiogram-left leg  10/6/15    ANGIOGRAPHY OF LOWER EXTREMITY Left 4/29/2021    Procedure: ANGIOGRAM, LOWER EXTREMITY;  Surgeon: Teddy Huber MD;  [facility];  Service: Vascular;  Laterality: Left;    BELOW KNEE AMPUTATION OF LOWER EXTREMITY Right 12/28/2021    Procedure: AMPUTATION, BELOW KNEE;  Surgeon: Kaitlyn Rojas MD;  Location: [facility] OR;  Service: General;  Laterality: Right;    CATHETERIZATION OF BOTH LEFT AND RIGHT HEART N/A 12/18/2019    Procedure: CATHETERIZATION, HEART, BOTH LEFT AND RIGHT;  Surgeon: Que Fernando III, MD;  Location: [facility] CATH LAB;  Service: Cardiology;  Laterality: N/A;    CORONARY ANGIOGRAPHY N/A 12/18/2019    Procedure: ANGIOGRAM, CORONARY ARTERY;  Surgeon: Que Fernando III, MD;  Location: [facility] CATH LAB;  Service: Cardiology;  Laterality: N/A;    CORONARY ANGIOGRAPHY INCLUDING BYPASS GRAFTS WITH CATHETERIZATION OF LEFT HEART N/A 7/28/2020    Procedure: ANGIOGRAM, CORONARY, INCLUDING BYPASS GRAFT, WITH LEFT HEART CATHETERIZATION, 9 am;  Surgeon: Rachel Easley MD;  Location: [facility] CATH LAB;  Service: Cardiology;  Laterality: N/A;    CORONARY ARTERY BYPASS GRAFT (CABG) N/A 1/14/2020    Procedure: CORONARY ARTERY BYPASS GRAFT (CABG) x 1     Off Pump;  Surgeon: Huang Altamirano MD;  Location: [address];  Service: Cardiovascular;  Laterality: N/A;    CREATION OF FEMORAL-TIBIAL ARTERY BYPASS Left 4/29/2021    Procedure: CREATION, BYPASS, ARTERIAL, FEMORAL TO ANTERIOR TIBIAL;  Surgeon: Teddy Huber MD;  [facility];  Service: Vascular;  Laterality: Left;    CREATION OF FEMOROPOPLITEAL ARTERIAL BYPASS USING GRAFT Left 8/18/2020    Procedure: CREATION, BYPASS, ARTERIAL, FEMORAL TO POPLITEAL, USING GRAFT, LEFT  LOWER EXTREMITY;  Surgeon: Teddy Huber MD;  Location: [facility] OR;  Service: Vascular;  Laterality: Left;  REQUEST 7:15 A.M. START----COVID NEGATIVE ON 8/17 1ST CASE STARTE PER LEANA ON 8/7/2020 @ 942AM-LO  RN PREOP 8/12/2020   T/S-----CLEARED BY CARDS-------PENDING INSURANCE    DEBRIDEMENT OF FOOT Left 9/8/2020    Procedure: DEBRIDEMENT, FOOT;  Surgeon: Rosio Mayes DPM;  Location: [facility] OR;  Service: Podiatry;  Laterality: Left;  request neoxx .   RN Pre Op 9-4-2020, Covid negative on 9/5/20. C A    DEBRIDEMENT OF FOOT  3/4/2021    Procedure: DEBRIDEMENT, FOOT;  Surgeon: Teddy Huber MD;  Location: [facility] OR;  Service: Vascular;;    DEBRIDEMENT OF FOOT Left 3/9/2021    Procedure: DEBRIDEMENT, FOOT, bone biopsy;  Surgeon: Rosio Mayse DPM;  Location: [facility] OR;  Service: Podiatry;  Laterality: Left;  Request neoxx---COVID IN AM  REQUESTING NOON START  RN Phone Pre op.On Blood thinners Plavix and Eliquis.  Covid am of surgery. C A    DEBRIDEMENT OF FOOT Left 5/4/2021    Procedure: DEBRIDEMENT, FOOT;  Surgeon: Farooq Morley DPM;  Location: [address];  Service: Podiatry;  Laterality: Left;    INSERTION OF TUNNELED CENTRAL VENOUS HEMODIALYSIS CATHETER N/A 1/27/2020    Procedure: Insertion, Catheter, Central Venous, Hemodialysis;  Surgeon: ESTEBAN Gomez III, MD;  Location: [facility] CATH LAB;  Service: Peripheral Vascular;  Laterality: N/A;    INSERTION OF TUNNELED CENTRAL VENOUS HEMODIALYSIS CATHETER  5/10/2023    Procedure: Insertion, Catheter, Central Venous, Hemodialysis;  Surgeon: Romulo Queen MD;  Location: [facility] CATH LAB;  Service: Cardiology;;    PERCUTANEOUS TRANSLUMINAL ANGIOPLASTY N/A 3/4/2021    Procedure: PTA (ANGIOPLASTY, PERCUTANEOUS, TRANSLUMINAL);  Surgeon: Teddy Huber MD;  Location: [facility] OR;  Service: Vascular;  Laterality: N/A;    REMOVAL OF ARTERIOVENOUS GRAFT Left 5/27/2021    Procedure: REMOVAL, GRAFT, LEFT LOWER EXTREMITY, WOUND EXPLORATION;  Surgeon: Teddy  RIANNA Huber MD;  Location: [facility] OR 2ND FLR;  Service: Vascular;  Laterality: Left;    REMOVAL OF NAIL OF DIGIT Left 3/9/2021    Procedure: REMOVAL, NAIL, DIGIT;  Surgeon: Rosio Mayes DPM;  Location: [facility] OR;  Service: Podiatry;  Laterality: Left;    RIGHT HEART CATHETERIZATION Right 5/10/2023    Procedure: INSERTION, CATHETER, RIGHT HEART;  Surgeon: Romulo Queen MD;  Location: [facility] CATH LAB;  Service: Cardiology;  Laterality: Right;    THROMBECTOMY Left 3/4/2021    Procedure: THROMBECTOMY, LEFT LOWER EXTREMITY BYPASS GRAFT, ANGIOGRAM, POSSIBLE INTERVENTION, POSSIBLE LEFT LOWER EXTREMITY BYPASS;  Surgeon: Teddy Huber MD;  Location: [facility] OR;  Service: Vascular;  Laterality: Left;    THROMBECTOMY Left 4/29/2021    Procedure: GRAFT THROMBECTOMY, LEFT LOWER EXTREMITY;  Surgeon: Teddy Huber MD;  Location: [facility] OR 2ND FLR;  Service: Vascular;  Laterality: Left;  14.5 min  1179.85 mGy  341.01 Gycm2  240 ml dye    THROMBECTOMY  10/22/2021    Procedure: THROMBECTOMY;  Surgeon: Saad Arenas MD;  Location: [facility] CATH LAB/EP;  Service: Cardiology;;     Family History       Problem Relation (Age of Onset)    Diabetes Mother, Father, Paternal Grandmother    Heart disease Maternal Grandmother    No Known Problems Maternal Grandfather, Paternal Grandfather          Tobacco Use    Smoking status: Former    Smokeless tobacco: Never   Substance and Sexual Activity    Alcohol use: No    Drug use: Yes     Types: Marijuana     Comment: occassional    Sexual activity: Yes     Partners: Male     Review of Systems   All other systems reviewed and are negative.    Objective:     Vital Signs (Most Recent):  Temp: 98.4 °F (36.9 °C) (09/30/24 1614)  Pulse: 82 (09/30/24 1614)  Resp: 18 (09/30/24 1614)  BP: (!) 147/71 (09/30/24 1614)  SpO2: 100 % (09/30/24 1614) Vital Signs (24h Range):  Temp:  [97.8 °F (36.6 °C)-98.4 °F (36.9 °C)] 98.4 °F (36.9 °C)  Pulse:  [74-82] 82  Resp:  [16-20] 18  SpO2:  [95 %-100 %] 100  %  BP: (118-150)/(59-76) 147/71     Weight: 74.8 kg (164 lb 14.5 oz)  Body mass index is 50.32 kg/m².      Physical Exam  Constitutional:       General: She is not in acute distress.  HENT:      Head: Normocephalic.      Mouth/Throat:      Mouth: Mucous membranes are moist.   Cardiovascular:      Rate and Rhythm: Normal rate.   Abdominal:      General: Abdomen is flat.   Musculoskeletal:      Cervical back: Neck supple.   Neurological:      General: No focal deficit present.      Mental Status: She is alert.   Psychiatric:         Judgment: Judgment normal.   Vascular:     Palpable L and R femoral pulses     No tissue loss in either stump -- no areas of fluctuance, erythema.       Significant Labs:  All pertinent labs from the last 24 hours have been reviewed.    Significant Diagnostics:  I have reviewed all pertinent imaging results/findings within the past 24 hours.  Assessment/Plan:     S/P AKA (above knee amputation) bilateral  This patient is a 58-year-old female with a complex history of peripheral vascular disease status post left and right above-knee amputation.  She is presenting with 5 days of crampy left stent pain, specifically at the medial aspect.  Her ipsilateral common femoral artery and profunda are patent.  She has a palpable pulse in the left groin.  There was no tissue loss.    Continue with anticoagulation and antiplatelet therapy.  Recommend multimodal analgesic regimen.  No indication for acute surgical intervention/revascularization at this time.        Thank you for your consult.     Jesus Lucero MD  Vascular Surgery Fellow  [facility] - Emergency Dept

## 2024-09-30 NOTE — PT/OT/SLP PROGRESS
Physical Therapy Treatment    Patient Name:  Suyapa Connelly   MRN:  [id number]    Recommendations:     Discharge Recommendations:  rehabilitation facility   Discharge Equipment Recommendations:  (TBD)   Barriers to Discharge: increased caregiver burden of care    Assessment:     Suyapa Connelly is a 56 y.o. female admitted with a medical diagnosis of Infected wound.  She presents with the following impairments/functional limitations:  weakness,impaired endurance,impaired self care skills,impaired sensation,impaired functional mobilty,decreased upper extremity function,decreased lower extremity function,pain,impaired skin,impaired fine motor,decreased ROM. Pt making progress as she completed beasy board transfers from bed<>w/c this date. Pt required min A to transfer from bed>w/c to the left and max A x 2 to transfer from w/c>bed to the left. Pt propelled w/c ~50 ft x 2 with SBA.   Despite patient's co-morbidities, patient was living in community setting functioning at min A level for ADLs, and mobility prior to this event.  There is an expectation of returning to prior level of function to maintain independence thus avoiding readmission.  Patient's clinical condition meets full Inpatient Rehab (IPR) criteria; including the ability to actively participate in 3 hours of therapy.     Rehab Prognosis: Good; patient would benefit from acute skilled PT services to address these deficits and reach maximum level of function.    Recent Surgery: * No surgery found *      Plan:     During this hospitalization, patient to be seen 5 x/week to address the identified rehab impairments via therapeutic activities,therapeutic exercises,neuromuscular re-education,wheelchair management/training and progress toward the following goals:    · Plan of Care Expires:  02/18/22    Subjective     Chief Complaint: pain to R residual limb  Patient/Family Comments/goals: pt motivated and agreeable to participate in therapy session, reporting  Subjective:       Patient ID: Sol Le is a 69 y.o. female.    Chief Complaint: Follow-up and Recurrent Skin Infections (Back hair line)    Sol Le a 69-year-old female patient who presents to clinic today for medication refills.  Patient has a history of Hyperlipidemia: last lipids: TC:163, Tri HDL: 61, LDL: 81 in his currently taking atorvastatin 40 mg daily along with Zetia 10 mg.  Hypertension controlled well with metoprolol.  She denies chest pains or palpitations.  Daily aspirin.  Hypothyroidism with her recent TSH low at 0.38.  She is currently taking levothyroxine 75 mcg.  She alternates taking 1 tablet for 2 days than a half tablet and repeat cycle.  She complains of intermittent itching on the back of her head and occasional itching in her perineum.  She went to the gynecologist and was told she had no yeast infection.  The itching comes and goes.  She has tried multiple different shampoos with no relief.        Review of patient's allergies indicates:   Allergen Reactions    Ace inhibitors     Bee venom protein (honey bee) Hives    Iodinated contrast media     Pcn [penicillins]      Social Drivers of Health     Tobacco Use: Low Risk  (2024)    Patient History     Smoking Tobacco Use: Never     Smokeless Tobacco Use: Never     Passive Exposure: Not on file   Alcohol Use: Not on file   Financial Resource Strain: Not on file   Food Insecurity: Not on file   Transportation Needs: Not on file   Physical Activity: Not on file   Stress: No Stress Concern Present (2019)    [employer] - Occupational Stress Questionnaire     Feeling of Stress : Not at all   Housing Stability: Not on file   Depression: Low Risk  (2024)    Depression     Last PHQ-4: Flowsheet Data: 0   Utilities: Not on file   Health Literacy: Not on file   Social Isolation: Not on file      Past Medical History:   Diagnosis Date    Hyperlipidemia 2018    Hypertension,  essential 1/19/2018    Hypothyroidism, unspecified       Past Surgical History:   Procedure Laterality Date    COLONOSCOPY      TUBAL LIGATION        Social History     Socioeconomic History    Marital status:          Current Outpatient Medications:     aspirin (ECOTRIN) 81 MG EC tablet, Take 81 mg by mouth once daily., Disp: , Rfl:     calcium-vitamin D3 (OS-CLIVE 500 + D3) 500 mg-5 mcg (200 unit) per tablet, Take 1 tablet by mouth 2 (two) times daily with meals., Disp: , Rfl:     fish oil-omega-3 fatty acids 300-1,000 mg capsule, Take by mouth once daily., Disp: , Rfl:     hydrOXYzine pamoate (VISTARIL) 25 MG Cap, Take 1 capsule (25 mg total) by mouth every 6 (six) hours as needed (anxiety)., Disp: 30 capsule, Rfl: 0    levothyroxine (SYNTHROID) 75 MCG tablet, TAKE ONE TABLET BY MOUTH ONCE DAILY, Disp: 90 tablet, Rfl: 3    multivitamin (THERAGRAN) per tablet, Take 1 tablet by mouth once daily., Disp: , Rfl:     atorvastatin (LIPITOR) 40 MG tablet, Take 1 tablet (40 mg total) by mouth once daily., Disp: 90 tablet, Rfl: 1    EPINEPHrine (EPIPEN 2-THAO) 0.3 mg/0.3 mL AtIn, Inject 0.3 mLs (0.3 mg total) into the muscle once. for 1 dose, Disp: 0.3 mL, Rfl: 1    ezetimibe (ZETIA) 10 mg tablet, Take 1 tablet (10 mg total) by mouth once daily., Disp: 90 tablet, Rfl: 1    metoprolol succinate (TOPROL-XL) 50 MG 24 hr tablet, TAKE ONE TABLET BY MOUTH ONCE DAILY, Disp: 90 tablet, Rfl: 1    Lab Results   Component Value Date    WBC 4.8 03/22/2024    HGB 15.3 03/22/2024    HCT 46.0 (H) 03/22/2024     03/22/2024    CHOL 163 09/23/2024    TRIG 110 09/23/2024    HDL 61 09/23/2024    ALT 25 09/23/2024    AST 27 09/23/2024     09/23/2024    K 5.0 09/23/2024     09/23/2024    CREATININE 0.83 09/23/2024    BUN 10 09/23/2024    CO2 29 09/23/2024    TSH 0.38 (L) 09/23/2024    HGBA1C 5.5 09/23/2024       Review of Systems   Constitutional:  Negative for chills, fatigue and fever.   Respiratory: Negative.    transfers did not increase or cause her pain in her sacrum but increase in RLE pain  Pain/Comfort:  · Pain Rating 1: 8/10  · Location - Side 1: Right  · Location 1:  (incision to R residual limb)  · Pain Addressed 1: Reposition,Distraction,Cessation of Activity,Nurse notified  · Pain Rating Post-Intervention 1: 10/10      Objective:     Patient found HOB elevated with bed alarm,PICC line upon PT entry to room.     General Precautions: Standard, fall   Orthopedic Precautions: (R BKA, L AKA)   Braces: N/A  Respiratory Status: Room air     Functional Mobility:  · Bed Mobility:     · Scooting: stand by assistance  · Supine to Sit: stand by assistance  · Sit to Supine: minimal assistance to raise RLE into bed  · Transfers:     · Bed to Chair: min A from bed>w/c to the left with beasy board and max A x 2 from w/c>bed to the left      AM-PAC 6 CLICK MOBILITY  Turning over in bed (including adjusting bedclothes, sheets and blankets)?: 3  Sitting down on and standing up from a chair with arms (e.g., wheelchair, bedside commode, etc.): 1  Moving from lying on back to sitting on the side of the bed?: 3  Moving to and from a bed to a chair (including a wheelchair)?: 2  Need to walk in hospital room?: 1  Climbing 3-5 steps with a railing?: 1  Basic Mobility Total Score: 11       Therapeutic Activities and Exercises:  Educated pt on role of PT and pt agreeable to participate in therapy session.  Educated on beasy board transfers and pt agreeable to complete this date.  Transitioned to sit EOB leaned R for PT to place beasy board.  Completed transfer to w/c, min A for boost over wheel.  Pt educated on pressure relief strategies in wheelchair.  Propelled w/c and sat ~15 mins - pressure relief techniques completed again, propelled back to room.  Completed transfer back to bed to the left and required max A x 2.  Returned to bed.    Patient left HOB elevated with all lines intact, call button in reach, bed alarm on and nurse    Cardiovascular: Negative.    Gastrointestinal: Negative.    Genitourinary:         Occasional vaginal itching   Integumentary:         Itchy scalp   Neurological: Negative.    Psychiatric/Behavioral: Negative.         Objective:      Physical Exam  Vitals reviewed.   Constitutional:       Appearance: Normal appearance.   Cardiovascular:      Rate and Rhythm: Normal rate and regular rhythm.      Pulses: Normal pulses.      Heart sounds: Normal heart sounds.   Pulmonary:      Effort: Pulmonary effort is normal.      Breath sounds: Normal breath sounds.   Skin:     General: Skin is warm and dry.      Capillary Refill: Capillary refill takes less than 2 seconds.      Findings: Erythema present. No lesion or rash. Rash is not crusting, purpuric or scaling.          Neurological:      General: No focal deficit present.      Mental Status: She is alert.   Psychiatric:         Mood and Affect: Mood normal.         Behavior: Behavior normal.         Thought Content: Thought content normal.         Judgment: Judgment normal.         Assessment:       1. Hypertension, essential    2. Hypothyroidism, unspecified type    3. Hyperlipidemia, unspecified hyperlipidemia type    4. Itchy scalp        Plan:       Sol was seen today for follow-up and recurrent skin infections.    Diagnoses and all orders for this visit:    Hypertension, essential  -     metoprolol succinate (TOPROL-XL) 50 MG 24 hr tablet; TAKE ONE TABLET BY MOUTH ONCE DAILY  -     CBC Auto Differential; Future  -     Comprehensive Metabolic Panel; Future  -     CBC Auto Differential  -     Comprehensive Metabolic Panel    Hypothyroidism, unspecified type  -     TSH; Future  -     TSH  -     TSH; Future  -     TSH    Hyperlipidemia, unspecified hyperlipidemia type  -     atorvastatin (LIPITOR) 40 MG tablet; Take 1 tablet (40 mg total) by mouth once daily.  -     ezetimibe (ZETIA) 10 mg tablet; Take 1 tablet (10 mg total) by mouth once daily.  -     Lipid Panel;  notified..    GOALS:   Multidisciplinary Problems     Physical Therapy Goals        Problem: Physical Therapy Goal    Goal Priority Disciplines Outcome Goal Variances Interventions   Physical Therapy Goal     PT, PT/OT Ongoing, Progressing     Description: Goals to be met by: 22     Patient will increase functional independence with mobility by performin. Supine <> sit with Stand-by Assistance  2. Bed to chair transfer with Minimal Assistance using scoot transfer or slideboard/beasy board  3. Wheelchair propulsion x 50 feet with Stand-by Assistance using bilateral upper extremities MET 2022    Updated  3. Wheelchair propulsion x 150 feet with Stand-by Assistance using bilateral upper extremities                     Time Tracking:     PT Received On: 22  PT Start Time: 919     PT Stop Time: 1005  PT Total Time (min): 46 min with OT    Billable Minutes: Therapeutic Activity 13 and Train/Wheelchair Management 10    Treatment Type: Treatment  PT/PTA: PT     PTA Visit Number: 0     2022   Future  -     Lipid Panel    Itchy scalp    Other orders  The following orders have not been finalized:  -     Cancel: levothyroxine (SYNTHROID) 75 MCG tablet    Hypertension   - metoprolol refill    Hypothyroidism   - TSH low.  Recommend skipping 1 day of medication during the week.  - recheck TSH in 1-2 months    Hyperlipidemia   - controlled  - continue atorvastatin and Zetia at current dose  - I recommend a heart healthy diet rich in fiber, fresh vegetables and fruit and low in saturated fats (fried foods, red meat, etc.).  I also recommend regular exercise.         Itchy scalp   - try using a moisturizing shampoo.  Also try applying CeraVe lotion after shower.  - daily antihistamine such as Zyrtec or Claritin.  Can try Benadryl at night if needed.  - consider referral to Dermatology if no improvement    Have fasting labs done prior to next appointment.  Follow-up in 6 months or sooner if needed.      This note was created using [employer] voice recognition software that occasionally misinterprets phrases or words.

## 2024-10-01 ENCOUNTER — PATIENT OUTREACH (OUTPATIENT)
Dept: EMERGENCY MEDICINE | Facility: HOSPITAL | Age: 59
End: 2024-10-01
Payer: MEDICARE

## 2024-10-01 ENCOUNTER — TELEPHONE (OUTPATIENT)
Dept: VASCULAR SURGERY | Facility: CLINIC | Age: 59
End: 2024-10-01
Payer: MEDICARE

## 2024-10-01 NOTE — DISCHARGE INSTRUCTIONS
Thank you for choosing [facility]!     Our goal in the Emergency Department is to always provide outstanding medical care. You may receive a survey by mail or e-mail in the next week regarding your experience today. We would greatly appreciate you completing and returning the survey. Your feedback provides us with a way to recognize our staff who provide very good care, and it helps us learn how to improve when your experience was below our aspiration of excellence.      It is important to remember that some problems are difficult to diagnose and may not be found during your first visit. Be sure to follow up with your primary care doctor and review any labs/imaging that was performed during your visit with them. If you do not have a primary care doctor, you may contact the one listed on your discharge paperwork, or you may also call the [facility] Appointment Desk at [phone] to schedule an appointment.     All medications may potentially have side effects and it is impossible to predict which medications may give you side effects. If you feel that you are having a negative effect of any medication you should immediately stop taking them and seek medical attention.  Do not drive or make any important decisions for 24 hours if you have received any pain medications, sedatives or mood altering drugs during your ER visit.    We appreciate you trusting us with your medical care. We will be happy to take care of you for all of your future medical needs. You may return to the ER at any time for any new/concerning symptoms, worsening condition, or failure to improve. We hope you feel better soon.     Sincerely,    Vivek Stringer Jr., MD  Board-Certified Emergency Medicine Physician  [facility]

## 2024-10-01 NOTE — ED NOTES
Calling  department in [facility] to perform wellness check on pts . Dispatch will call back

## 2024-10-01 NOTE — TELEPHONE ENCOUNTER
----- Message from Teddy Huber MD sent at 10/1/2024  1:05 PM CDT -----  Please schedule f/u with me in the next few weeks, thank you

## 2024-10-02 ENCOUNTER — TELEPHONE (OUTPATIENT)
Dept: VASCULAR SURGERY | Facility: CLINIC | Age: 59
End: 2024-10-02
Payer: MEDICARE

## 2024-10-03 ENCOUNTER — TELEPHONE (OUTPATIENT)
Dept: VASCULAR SURGERY | Facility: CLINIC | Age: 59
End: 2024-10-03
Payer: MEDICARE

## 2024-10-15 ENCOUNTER — OFFICE VISIT (OUTPATIENT)
Dept: FAMILY MEDICINE | Facility: CLINIC | Age: 59
End: 2024-10-15
Payer: MEDICAID

## 2024-10-15 VITALS
HEIGHT: 55 IN | BODY MASS INDEX: 50.32 KG/M2 | HEART RATE: 103 BPM | SYSTOLIC BLOOD PRESSURE: 124 MMHG | OXYGEN SATURATION: 93 % | DIASTOLIC BLOOD PRESSURE: 72 MMHG

## 2024-10-15 DIAGNOSIS — Z12.4 SCREENING FOR CERVICAL CANCER: ICD-10-CM

## 2024-10-15 DIAGNOSIS — N18.6 ANEMIA DUE TO CHRONIC KIDNEY DISEASE, ON CHRONIC DIALYSIS: ICD-10-CM

## 2024-10-15 DIAGNOSIS — Z79.4 TYPE 2 DIABETES MELLITUS WITH HYPERGLYCEMIA, WITH LONG-TERM CURRENT USE OF INSULIN: ICD-10-CM

## 2024-10-15 DIAGNOSIS — E66.01 SEVERE OBESITY (BMI >= 40): ICD-10-CM

## 2024-10-15 DIAGNOSIS — Z79.4 TYPE 2 DIABETES MELLITUS WITH CHRONIC KIDNEY DISEASE ON CHRONIC DIALYSIS, WITH LONG-TERM CURRENT USE OF INSULIN: ICD-10-CM

## 2024-10-15 DIAGNOSIS — I25.119 ATHEROSCLEROSIS OF NATIVE CORONARY ARTERY OF NATIVE HEART WITH ANGINA PECTORIS: ICD-10-CM

## 2024-10-15 DIAGNOSIS — Z89.9 CHRONIC PAIN AFTER AMPUTATION: ICD-10-CM

## 2024-10-15 DIAGNOSIS — D63.1 ANEMIA DUE TO CHRONIC KIDNEY DISEASE, ON CHRONIC DIALYSIS: ICD-10-CM

## 2024-10-15 DIAGNOSIS — F41.1 GAD (GENERALIZED ANXIETY DISORDER): ICD-10-CM

## 2024-10-15 DIAGNOSIS — F32.1 CURRENT MODERATE EPISODE OF MAJOR DEPRESSIVE DISORDER WITHOUT PRIOR EPISODE: ICD-10-CM

## 2024-10-15 DIAGNOSIS — Z95.1 S/P CABG X 1: ICD-10-CM

## 2024-10-15 DIAGNOSIS — N18.6 TYPE 2 DIABETES MELLITUS WITH CHRONIC KIDNEY DISEASE ON CHRONIC DIALYSIS, WITH LONG-TERM CURRENT USE OF INSULIN: ICD-10-CM

## 2024-10-15 DIAGNOSIS — N64.89 BREAST ASYMMETRY: ICD-10-CM

## 2024-10-15 DIAGNOSIS — G89.21 CHRONIC PAIN AFTER AMPUTATION: ICD-10-CM

## 2024-10-15 DIAGNOSIS — I25.10 CORONARY ARTERY DISEASE, UNSPECIFIED VESSEL OR LESION TYPE, UNSPECIFIED WHETHER ANGINA PRESENT, UNSPECIFIED WHETHER NATIVE OR TRANSPLANTED HEART: ICD-10-CM

## 2024-10-15 DIAGNOSIS — N18.6 ESRD (END STAGE RENAL DISEASE): ICD-10-CM

## 2024-10-15 DIAGNOSIS — Z99.2 TYPE 2 DIABETES MELLITUS WITH CHRONIC KIDNEY DISEASE ON CHRONIC DIALYSIS, WITH LONG-TERM CURRENT USE OF INSULIN: ICD-10-CM

## 2024-10-15 DIAGNOSIS — F51.04 PSYCHOPHYSIOLOGICAL INSOMNIA: ICD-10-CM

## 2024-10-15 DIAGNOSIS — E11.22 TYPE 2 DIABETES MELLITUS WITH CHRONIC KIDNEY DISEASE ON CHRONIC DIALYSIS, WITH LONG-TERM CURRENT USE OF INSULIN: ICD-10-CM

## 2024-10-15 DIAGNOSIS — E11.65 TYPE 2 DIABETES MELLITUS WITH HYPERGLYCEMIA, WITH LONG-TERM CURRENT USE OF INSULIN: ICD-10-CM

## 2024-10-15 DIAGNOSIS — T82.868D THROMBOSIS OF FEMORO-POPLITEAL BYPASS GRAFT, SUBSEQUENT ENCOUNTER: ICD-10-CM

## 2024-10-15 DIAGNOSIS — I50.42 CHRONIC COMBINED SYSTOLIC AND DIASTOLIC HEART FAILURE: ICD-10-CM

## 2024-10-15 DIAGNOSIS — E55.9 VITAMIN D DEFICIENCY: ICD-10-CM

## 2024-10-15 DIAGNOSIS — Z99.2 ANEMIA DUE TO CHRONIC KIDNEY DISEASE, ON CHRONIC DIALYSIS: ICD-10-CM

## 2024-10-15 DIAGNOSIS — G54.6 PHANTOM LIMB SYNDROME WITH PAIN: ICD-10-CM

## 2024-10-15 DIAGNOSIS — Z23 NEED FOR TDAP VACCINATION: Primary | ICD-10-CM

## 2024-10-15 DIAGNOSIS — Z23 NEED FOR VACCINATION: ICD-10-CM

## 2024-10-15 DIAGNOSIS — N64.4 MASTODYNIA: ICD-10-CM

## 2024-10-15 DIAGNOSIS — R53.81 DEBILITY: ICD-10-CM

## 2024-10-15 PROCEDURE — 90471 IMMUNIZATION ADMIN: CPT | Mod: PBBFAC,HCNC,PN

## 2024-10-15 PROCEDURE — 99215 OFFICE O/P EST HI 40 MIN: CPT | Mod: PBBFAC,HCNC,PN,25 | Performed by: STUDENT IN AN ORGANIZED HEALTH CARE EDUCATION/TRAINING PROGRAM

## 2024-10-15 PROCEDURE — 99999PBSHW PR PBB SHADOW TECHNICAL ONLY FILED TO HB: Mod: PBBFAC,HCNC,,

## 2024-10-15 PROCEDURE — 90677 PCV20 VACCINE IM: CPT | Mod: PBBFAC,HCNC,PN

## 2024-10-15 PROCEDURE — 99999 PR PBB SHADOW E&M-EST. PATIENT-LVL V: CPT | Mod: PBBFAC,HCNC,, | Performed by: STUDENT IN AN ORGANIZED HEALTH CARE EDUCATION/TRAINING PROGRAM

## 2024-10-15 RX ORDER — PEN NEEDLE, DIABETIC 33 GX5/32"
1 NEEDLE, DISPOSABLE MISCELLANEOUS 3 TIMES DAILY
Qty: 100 EACH | Refills: 0 | Status: ON HOLD | OUTPATIENT
Start: 2024-10-15

## 2024-10-15 RX ORDER — FUROSEMIDE 40 MG/1
40 TABLET ORAL 2 TIMES DAILY
Qty: 180 TABLET | Refills: 0 | Status: ON HOLD | OUTPATIENT
Start: 2024-10-15 | End: 2025-01-13

## 2024-10-15 RX ORDER — CLOPIDOGREL BISULFATE 75 MG/1
75 TABLET ORAL DAILY
Qty: 30 TABLET | Refills: 11 | Status: ON HOLD | OUTPATIENT
Start: 2024-10-15 | End: 2025-10-15

## 2024-10-15 RX ORDER — SERTRALINE HYDROCHLORIDE 100 MG/1
100 TABLET, FILM COATED ORAL DAILY
Qty: 30 TABLET | Refills: 11 | Status: ON HOLD | OUTPATIENT
Start: 2024-10-15 | End: 2025-10-15

## 2024-10-15 RX ORDER — ATORVASTATIN CALCIUM 80 MG/1
80 TABLET, FILM COATED ORAL DAILY
Qty: 30 TABLET | Refills: 11 | Status: ON HOLD | OUTPATIENT
Start: 2024-10-15 | End: 2025-10-15

## 2024-10-15 RX ORDER — OXYCODONE AND ACETAMINOPHEN 5; 325 MG/1; MG/1
1 TABLET ORAL EVERY 8 HOURS PRN
Qty: 21 TABLET | Refills: 0 | Status: SHIPPED | OUTPATIENT
Start: 2024-10-15 | End: 2024-10-22

## 2024-10-15 RX ORDER — PEN NEEDLE, DIABETIC 30 GX3/16"
1 NEEDLE, DISPOSABLE MISCELLANEOUS
Qty: 100 EACH | Refills: 3 | Status: ON HOLD | OUTPATIENT
Start: 2024-10-15

## 2024-10-15 RX ORDER — INSULIN GLARGINE 100 [IU]/ML
8 INJECTION, SOLUTION SUBCUTANEOUS NIGHTLY
Status: ON HOLD | COMMUNITY

## 2024-10-15 RX ORDER — PREGABALIN 50 MG/1
50 CAPSULE ORAL
Qty: 36 CAPSULE | Refills: 0 | Status: ON HOLD | OUTPATIENT
Start: 2024-10-16 | End: 2025-01-14

## 2024-10-15 RX ORDER — CARVEDILOL 6.25 MG/1
6.25 TABLET ORAL 2 TIMES DAILY
Qty: 60 TABLET | Refills: 11 | Status: ON HOLD | OUTPATIENT
Start: 2024-10-15 | End: 2025-10-15

## 2024-10-15 RX ORDER — DEXTROSE 40 %
15 GEL (GRAM) ORAL ONCE
Qty: 37.5 G | Refills: 3 | Status: ON HOLD | OUTPATIENT
Start: 2024-10-15 | End: 2024-10-15

## 2024-10-15 RX ORDER — TRAZODONE HYDROCHLORIDE 50 MG/1
25 TABLET ORAL NIGHTLY
Qty: 15 TABLET | Refills: 11 | Status: ON HOLD | OUTPATIENT
Start: 2024-10-15 | End: 2025-10-15

## 2024-10-15 RX ORDER — HYDRALAZINE HYDROCHLORIDE 50 MG/1
50 TABLET, FILM COATED ORAL 3 TIMES DAILY
Qty: 90 TABLET | Refills: 11 | Status: ON HOLD | OUTPATIENT
Start: 2024-10-15 | End: 2025-10-15

## 2024-10-15 RX ORDER — LANCETS 33 GAUGE
EACH MISCELLANEOUS
Qty: 100 EACH | Refills: 3 | Status: ON HOLD | OUTPATIENT
Start: 2024-10-15

## 2024-10-15 RX ORDER — BLOOD-GLUCOSE SENSOR
1 EACH MISCELLANEOUS DAILY
Qty: 5 EACH | Refills: 0 | Status: ON HOLD | OUTPATIENT
Start: 2024-10-15 | End: 2025-10-15

## 2024-10-15 RX ADMIN — PNEUMOCOCCAL 20-VALENT CONJUGATE VACCINE 0.5 ML
2.2; 2.2; 2.2; 2.2; 2.2; 2.2; 2.2; 2.2; 2.2; 2.2; 2.2; 2.2; 2.2; 2.2; 2.2; 2.2; 4.4; 2.2; 2.2; 2.2 INJECTION, SUSPENSION INTRAMUSCULAR at 12:10

## 2024-10-17 NOTE — ASSESSMENT & PLAN NOTE
Continue clopidogrel.  Not on aspirin due to apixaban.     Spoke with patient who reiterated her urinary symptoms as slight burning, some urgency but no fever. She has an appointment with Dr. Denton tomorrow and I instructed her to relay symptoms to him so an urinalysis may be ordered & sample may be submitted while at appointment. She was reluctant at first stated she would wait until 10/28 appointment to submit sample. I explained sooner the better in case of infection. She was in agreement with plan.

## 2024-10-18 ENCOUNTER — TELEPHONE (OUTPATIENT)
Dept: FAMILY MEDICINE | Facility: CLINIC | Age: 59
End: 2024-10-18
Payer: MEDICARE

## 2024-10-18 NOTE — TELEPHONE ENCOUNTER
----- Message from Tanya sent at 10/18/2024  9:30 AM CDT -----  Reach at   [phone]  Spouse showed to say patient got all her medication refills except the NOVA LOG SHORT TERM      call to [facility] in [place]

## 2024-10-18 NOTE — TELEPHONE ENCOUNTER
----- Message from Tanya sent at 10/18/2024  9:30 AM CDT -----  Reach at   [phone]  Spouse showed to say patient got all her medication refills except the NOVA LOG SHORT TERM      call to [facility] in [place]

## 2024-10-21 ENCOUNTER — E-CONSULT (OUTPATIENT)
Dept: PHARMACY | Facility: CLINIC | Age: 59
End: 2024-10-21
Payer: MEDICAID

## 2024-10-21 DIAGNOSIS — Z71.89 OTHER SPECIFIED COUNSELING: Primary | ICD-10-CM

## 2024-10-21 PROBLEM — T68.XXXA HYPOTHERMIA: Status: RESOLVED | Noted: 2023-12-02 | Resolved: 2024-10-21

## 2024-10-21 PROBLEM — Z99.2 TYPE 2 DIABETES MELLITUS WITH CHRONIC KIDNEY DISEASE ON CHRONIC DIALYSIS, WITH LONG-TERM CURRENT USE OF INSULIN: Status: ACTIVE | Noted: 2024-10-21

## 2024-10-21 PROBLEM — I25.119 ATHEROSCLEROSIS OF NATIVE CORONARY ARTERY OF NATIVE HEART WITH ANGINA PECTORIS: Status: ACTIVE | Noted: 2024-10-21

## 2024-10-21 PROBLEM — E11.22 TYPE 2 DIABETES MELLITUS WITH CHRONIC KIDNEY DISEASE ON CHRONIC DIALYSIS, WITH LONG-TERM CURRENT USE OF INSULIN: Status: ACTIVE | Noted: 2024-10-21

## 2024-10-21 PROBLEM — Z12.4 SCREENING FOR CERVICAL CANCER: Status: ACTIVE | Noted: 2024-10-21

## 2024-10-21 PROBLEM — Z23 NEED FOR TDAP VACCINATION: Status: ACTIVE | Noted: 2024-10-21

## 2024-10-21 PROBLEM — N64.4 MASTODYNIA: Status: ACTIVE | Noted: 2024-10-21

## 2024-10-21 PROBLEM — F51.04 PSYCHOPHYSIOLOGICAL INSOMNIA: Status: ACTIVE | Noted: 2024-10-21

## 2024-10-21 PROBLEM — N64.89 BREAST ASYMMETRY: Status: ACTIVE | Noted: 2024-10-21

## 2024-10-21 PROBLEM — Z23 NEED FOR VACCINATION: Status: ACTIVE | Noted: 2024-10-21

## 2024-10-21 PROBLEM — N18.6 TYPE 2 DIABETES MELLITUS WITH CHRONIC KIDNEY DISEASE ON CHRONIC DIALYSIS, WITH LONG-TERM CURRENT USE OF INSULIN: Status: ACTIVE | Noted: 2024-10-21

## 2024-10-21 PROBLEM — Z79.4 TYPE 2 DIABETES MELLITUS WITH CHRONIC KIDNEY DISEASE ON CHRONIC DIALYSIS, WITH LONG-TERM CURRENT USE OF INSULIN: Status: ACTIVE | Noted: 2024-10-21

## 2024-10-21 NOTE — CONSULTS
[facility] - Pharmacy[facility]  Response for E-Consult     Patient Name: Suyapa Connelly  MRN: [id number]  Primary Care Provider: Vanessa Noel MD   Requesting Provider: Vanessa Noel MD  E-Consult to Pharmacy  Consult performed by: Adela Mora PharmD  Consult ordered by: Vanessa Noel MD  Reason for consult: Renal dosing review  Assessment/Recommendations: Elquis          Recommendation:   Pepcid - reduce to 10 mg every other day  Lasix - increase to 80 mg BID (max 600 mg daily)  Lyrica - reduce to 25 mg ddaily    Contingency that warrants a repeat eConsult or referral: n/a    Total time of Consultation: 15 minute    I did not speak to the requesting provider verbally about this.     *This eConsult is based on the clinical data available to me and is furnished without benefit of a physical examination. The eConsult will need to be interpreted in light of any clinical issues or changes in patient status not available to me at the time of filing this eConsults. Significant changes in patient condition or level of acuity should result in immediate formal consultation and reevaluation. Please alert me if you have further questions.    Thank you for this eConsult referral.     Adela Mora PharmD  [facility]

## 2024-10-21 NOTE — ASSESSMENT & PLAN NOTE
- Continue current insulin regimen (3-5 units of NovoLog with each meal and 8 units of Lantus at night)    - Provide prescription for Dexcom continuous glucose monitoring system    - Endocrinology referral

## 2024-10-21 NOTE — ASSESSMENT & PLAN NOTE
- Refill pregabalin prescription    - Pain management referral    - Prescribe oxycodone 5 mg every 8 hours for severe pain (7-day course) until pain management appointment

## 2024-10-21 NOTE — PROGRESS NOTES
Subjective     Patient ID: Suyapa Connelly is a 59 y.o. female.    Chief Complaint: Establish Care, Leg Pain (Left leg. States pt has clots and very painful ), and breast swelling (Left breast swelling. States it has been happening for over a month. Only gets better after dialysis )    Suyapa, a 59 year old woman with pmhx of ESRD on dialysis, bilateral AKA, DM type II, obesity, htn, hld, hx of dvt  currently taking Eliquis twice a day., depression She has undergone two amputations, the first one three years ago on her birthday and the second one two years ago, the Monday after Christmas. She experiences phantom limb pain and requires a refill on pregabalin, which she has been taking since her first surgery. Suyapa undergoes dialysis three times a week (Tuesday, Thursday, and Saturday) and has not had a pregabalin refill in a while.     Suyapa struggles with depression and has not seen a psychiatrist or psychologist.     The patient has a history of cerebrovascular disease and underwent a carotid endarterectomy in the past, close to the time of her first surgery.    Suyapa has diabetes and takes 3 to 5 units of NovoLog with each meal and 8 units of Lantus at night. Her blood sugar levels are generally well-regulated, though it was 201 today after having ice cream last night. She has been on this diabetes regimen since before the surgeries. She is interested in using a Dexcom for better glucose monitoring.      The patient's left breast enlarges after dialysis but deflates a little. She runs on dialysis for 3.5 to 4 hours, starting at 2 PM. She has some history of cramping during dialysis, so the machine is sometimes slowed down. Suyapa is not sleeping well and has been experiencing nightmares, which she believes may be related to her depression. She is currently taking Zoloft (sertraline) and trazodone for her depression and sleep issues.    Suyapa has been experiencing increased pain in her leg with the staple,  and the Tylenol she has been taking is not providing sufficient relief. She has been prescribed oxycodone in the past for pain management due to her kidney dysfunction.    Leg Pain   Pertinent negatives include no numbness.     Review of Systems   Constitutional:  Negative for appetite change, chills, fever and unexpected weight change.   Respiratory:  Negative for shortness of breath and wheezing.    Cardiovascular:  Negative for chest pain, palpitations and leg swelling.   Gastrointestinal:  Negative for abdominal distention, abdominal pain, anal bleeding, blood in stool, constipation, nausea and vomiting.   Endocrine: Negative for cold intolerance, heat intolerance, polydipsia, polyphagia and polyuria.   Musculoskeletal:  Positive for leg pain. Negative for arthralgias and myalgias.   Neurological:  Negative for seizures, syncope, speech difficulty, weakness and numbness.   Psychiatric/Behavioral:  Positive for depressed mood and dysphoric mood. Negative for self-injury and suicidal ideas.           Objective     Physical Exam  Constitutional:       General: She is not in acute distress.     Appearance: She is normal weight.   HENT:      Head: Normocephalic and atraumatic.      Nose: Nose normal.      Mouth/Throat:      Mouth: Mucous membranes are moist.   Eyes:      Extraocular Movements: Extraocular movements intact.      Conjunctiva/sclera: Conjunctivae normal.      Pupils: Pupils are equal, round, and reactive to light.   Cardiovascular:      Rate and Rhythm: Normal rate and regular rhythm.      Heart sounds: No murmur heard.     No friction rub. No gallop.   Pulmonary:      Effort: Pulmonary effort is normal.      Breath sounds: Normal breath sounds. No stridor. No wheezing, rhonchi or rales.   Abdominal:      General: Abdomen is flat. There is no distension.      Palpations: Abdomen is soft. There is no mass.      Tenderness: There is no abdominal tenderness. There is no guarding.   Musculoskeletal:          General: Normal range of motion.      Cervical back: Normal range of motion.      Right lower leg: No edema.      Left lower leg: No edema.   Skin:     General: Skin is warm.   Neurological:      General: No focal deficit present.      Mental Status: She is alert. Mental status is at baseline.   Psychiatric:         Mood and Affect: Mood normal.         Behavior: Behavior normal.            Assessment and Plan     1. Need for Tdap vaccination    2. Coronary artery disease, unspecified vessel or lesion type, unspecified whether angina present, unspecified whether native or transplanted heart  Overview:  Formatting of this note might be different from the original.  Last Assessment & Plan:   Formatting of this note might be different from the original.  -- Optimize glucose control.    Assessment & Plan:  Patient with known CAD s/p CABG, which is controlled Will continue Plavix and Statin, carvedilol  Cardiology referral placed       Orders:  -     Ambulatory referral/consult to Cardiology; Future; Expected date: 10/22/2024  -     atorvastatin (LIPITOR) 80 MG tablet; Take 1 tablet (80 mg total) by mouth once daily.  Dispense: 30 tablet; Refill: 11    3. S/P CABG x 1  Assessment & Plan:  Follow up with cardiology    Orders:  -     Ambulatory referral/consult to Cardiology; Future; Expected date: 10/22/2024  -     clopidogreL (PLAVIX) 75 mg tablet; Take 1 tablet (75 mg total) by mouth once daily.  Dispense: 30 tablet; Refill: 11    4. CAROLIN (generalized anxiety disorder)  -     sertraline (ZOLOFT) 100 MG tablet; Take 1 tablet (100 mg total) by mouth once daily.  Dispense: 30 tablet; Refill: 11    5. Breast asymmetry  Assessment & Plan:  Follow up mammogram results  Pt associates asymmetry with dialysis      6. Chronic pain after amputation  -     Ambulatory referral/consult to Pain Clinic; Future; Expected date: 10/22/2024  -     pregabalin (LYRICA) 50 MG capsule; Take 1 capsule (50 mg total) by mouth 3 (three) times a  week.  Dispense: 36 capsule; Refill: 0    7. Thrombosis of femoro-popliteal bypass graft, subsequent encounter  Assessment & Plan:    - Continue Eliquis as prescribed    - Vascular medicine referral    Orders:  -     Ambulatory referral/consult to Vascular Medicine; Future; Expected date: 10/22/2024  -     apixaban (ELIQUIS) 5 mg Tab; Take 1 tablet (5 mg total) by mouth 2 (two) times daily.  Dispense: 60 tablet; Refill: 11    8. Current moderate episode of major depressive disorder without prior episode  Assessment & Plan:    - Continue Zoloft (sertraline) at night    - Psychiatry referral  - No SI/HI    Orders:  -     Ambulatory referral/consult to Psychiatry; Future; Expected date: 10/22/2024    9. Type 2 diabetes mellitus with hyperglycemia, with long-term current use of insulin  Assessment & Plan:    - Continue current insulin regimen (3-5 units of NovoLog with each meal and 8 units of Lantus at night)    - Provide prescription for Dexcom continuous glucose monitoring system    - Endocrinology referral    Orders:  -     Ambulatory referral/consult to Endocrinology; Future; Expected date: 10/22/2024  -     blood-glucose sensor (DEXCOM G7 SENSOR) Radha; 1 each by Misc.(Non-Drug; Combo Route) route once daily.  Dispense: 5 each; Refill: 0  -     Mammo Digital Diagnostic Bilat with Vignesh; Future; Expected date: 10/15/2024    10. Screening for cervical cancer  -     Ambulatory referral/consult to Obstetrics / Gynecology; Future; Expected date: 10/22/2024    11. Need for vaccination  -     pneumoc 20-azam conj-dip cr(PF) (PREVNAR-20 (PF)) injection Syrg 0.5 mL    12. Mastodynia  -     Mammo Digital Diagnostic Bilat with Vignesh; Future; Expected date: 10/15/2024    13. ESRD (end stage renal disease)  Assessment & Plan:    - Dialysis on Tuesday, Thursday, and Saturday    - Continue follow up with nephrology    Orders:  -     E-Consult to Pharmacy    14. Type 2 diabetes mellitus with chronic kidney disease on chronic  "dialysis, with long-term current use of insulin    15. Atherosclerosis of native coronary artery of native heart with angina pectoris  Assessment & Plan:  C/w plavix, lipitor, carvedilol  Referral to cardiology      16. Phantom limb syndrome with pain  Assessment & Plan:    - Refill pregabalin prescription    - Pain management referral    - Prescribe oxycodone 5 mg every 8 hours for severe pain (7-day course) until pain management appointment      17. Psychophysiological insomnia  Assessment & Plan:  C/w trazodone      18. Debility  Assessment & Plan:   is caretaker, for now, declined CM involvement      19. Severe obesity (BMI >= 40)  Overview:  BMI 45    Assessment & Plan:  Encourage healthy diet, lifestyle      20. Vitamin D deficiency  Assessment & Plan:  Stable, managed by nephrology      21. Anemia due to chronic kidney disease, on chronic dialysis  Assessment & Plan:  Stable, managed by nephrology      22. Chronic combined systolic and diastolic heart failure  Assessment & Plan:  On Carvedilol and Lasix  Cardiology referral placed      Other orders  -     carvediloL (COREG) 6.25 MG tablet; Take 1 tablet (6.25 mg total) by mouth 2 (two) times daily.  Dispense: 60 tablet; Refill: 11  -     furosemide (LASIX) 40 MG tablet; Take 1 tablet (40 mg total) by mouth 2 (two) times a day.  Dispense: 180 tablet; Refill: 0  -     hydrALAZINE (APRESOLINE) 50 MG tablet; Take 1 tablet (50 mg total) by mouth 3 (three) times daily.  Dispense: 90 tablet; Refill: 11  -     lancets 33 gauge Misc; TEST 3 TIMES DAILY  Dispense: 100 each; Refill: 3  -     blood sugar diagnostic Strp; 1 each by Misc.(Non-Drug; Combo Route) route 3 (three) times daily.  Dispense: 200 each; Refill: 0  -     blood sugar diagnostic Strp; Use to check blood glucose 2 (two) times a day.  Dispense: 100 strip; Refill: 0  -     pen needle, diabetic 33 gauge x 5/32" Ndle; 1 each by Misc.(Non-Drug; Combo Route) route 3 (three) times daily.  Dispense: 100 " "each; Refill: 0  -     pen needle, diabetic 32 gauge x 5/32" Ndle; 1 Units by Misc.(Non-Drug; Combo Route) route before meals as needed (SSI).  Dispense: 100 each; Refill: 3  -     dextrose (GLUTOSE) 40 % gel; Take 37.5 mLs (15,000 mg total) by mouth once. for 1 dose  Dispense: 37.5 g; Refill: 3  -     traZODone (DESYREL) 50 MG tablet; Take 0.5 tablets (25 mg total) by mouth every evening.  Dispense: 15 tablet; Refill: 11  -     oxyCODONE-acetaminophen (PERCOCET) 5-325 mg per tablet; Take 1 tablet by mouth every 8 (eight) hours as needed for Pain (Severe pain).  Dispense: 21 tablet; Refill: 0                 No follow-ups on file.    "

## 2024-10-21 NOTE — ASSESSMENT & PLAN NOTE
Patient with known CAD s/p CABG, which is controlled Will continue Plavix and Statin, carvedilol  Cardiology referral placed

## 2024-10-22 ENCOUNTER — TELEPHONE (OUTPATIENT)
Dept: VASCULAR SURGERY | Facility: CLINIC | Age: 59
End: 2024-10-22
Payer: MEDICAID

## 2024-10-22 NOTE — TELEPHONE ENCOUNTER
----- Message from Nurse Cain sent at 10/22/2024  2:15 PM CDT -----  Regarding: FW: pain  Contact: pt     ----- Message -----  From: Payton Cowart LPN  Sent: 10/16/2024  10:12 AM CDT  To: Teddy Huber MD  Subject: pain                                             Spoke with pt. and states pt. established care with a PCP. Pt. was prescribed pain medication and is still having pain. She recently went to ER for pain to [facility].  and dx with blood clot and on medication for that. She is s/p R & LAKA. I told pt.  if pt.still having pain she needs to go back to ER and to go back to [facility].not [facility].  verbalized understanding. Please advise further. Thanks!  ----- Message -----  From: Bipin Alfaro  Sent: 10/16/2024   9:17 AM CDT  To: Cec[facility] Espinal Staff    Type: Requesting to speak with nurse        Who Called: PT's   Regarding: wife is in pain crying. Would like to be seen sooner.  Would the patient rather a call back or a response via [facility]? Call back  Best Call Back Number: [phone]   Additional Information:

## 2024-10-23 ENCOUNTER — PATIENT OUTREACH (OUTPATIENT)
Dept: ADMINISTRATIVE | Facility: HOSPITAL | Age: 59
End: 2024-10-23
Payer: MEDICAID

## 2024-10-25 PROBLEM — Z60.9 SOCIAL PROBLEM: Status: ACTIVE | Noted: 2024-10-25

## 2024-10-28 ENCOUNTER — HOSPITAL ENCOUNTER (INPATIENT)
Facility: HOSPITAL | Age: 59
LOS: 15 days | Discharge: NURSING, REHAB OR LONG-TERM CARE | DRG: 564 | End: 2024-11-12
Attending: INTERNAL MEDICINE | Admitting: INTERNAL MEDICINE
Payer: MEDICARE

## 2024-10-28 DIAGNOSIS — G89.21 CHRONIC PAIN AFTER AMPUTATION: ICD-10-CM

## 2024-10-28 DIAGNOSIS — L08.9 WOUND INFECTION: ICD-10-CM

## 2024-10-28 DIAGNOSIS — L03.116 CELLULITIS OF LEFT THIGH: ICD-10-CM

## 2024-10-28 DIAGNOSIS — T14.8XXA WOUND INFECTION: ICD-10-CM

## 2024-10-28 DIAGNOSIS — G93.40 ACUTE ENCEPHALOPATHY: Primary | ICD-10-CM

## 2024-10-28 DIAGNOSIS — Z89.611 S/P AKA (ABOVE KNEE AMPUTATION) BILATERAL: ICD-10-CM

## 2024-10-28 DIAGNOSIS — S71.102A OPEN THIGH WOUND, LEFT, INITIAL ENCOUNTER: ICD-10-CM

## 2024-10-28 DIAGNOSIS — N18.6 ESRD (END STAGE RENAL DISEASE) ON DIALYSIS: ICD-10-CM

## 2024-10-28 DIAGNOSIS — D62 ACUTE BLOOD LOSS ANEMIA: ICD-10-CM

## 2024-10-28 DIAGNOSIS — R07.9 CHEST PAIN: ICD-10-CM

## 2024-10-28 DIAGNOSIS — Z89.9 CHRONIC PAIN AFTER AMPUTATION: ICD-10-CM

## 2024-10-28 DIAGNOSIS — S81.802A LEG WOUND, LEFT: ICD-10-CM

## 2024-10-28 DIAGNOSIS — N18.6 ESRD (END STAGE RENAL DISEASE): ICD-10-CM

## 2024-10-28 DIAGNOSIS — Z89.612 S/P AKA (ABOVE KNEE AMPUTATION) BILATERAL: ICD-10-CM

## 2024-10-28 DIAGNOSIS — Z99.2 ESRD (END STAGE RENAL DISEASE) ON DIALYSIS: ICD-10-CM

## 2024-10-28 PROCEDURE — 63600175 PHARM REV CODE 636 W HCPCS: Mod: HCNC | Performed by: INTERNAL MEDICINE

## 2024-10-28 PROCEDURE — 25000003 PHARM REV CODE 250: Mod: HCNC | Performed by: INTERNAL MEDICINE

## 2024-10-28 PROCEDURE — 11000001 HC ACUTE MED/SURG PRIVATE ROOM: Mod: HCNC

## 2024-10-28 PROCEDURE — 5A1D70Z PERFORMANCE OF URINARY FILTRATION, INTERMITTENT, LESS THAN 6 HOURS PER DAY: ICD-10-PCS | Performed by: INTERNAL MEDICINE

## 2024-10-28 RX ORDER — NALOXONE HCL 0.4 MG/ML
0.02 VIAL (ML) INJECTION
Status: DISCONTINUED | OUTPATIENT
Start: 2024-10-28 | End: 2024-11-12 | Stop reason: HOSPADM

## 2024-10-28 RX ORDER — INSULIN GLARGINE 100 [IU]/ML
5 INJECTION, SOLUTION SUBCUTANEOUS NIGHTLY
Status: DISCONTINUED | OUTPATIENT
Start: 2024-10-28 | End: 2024-11-04

## 2024-10-28 RX ORDER — INSULIN ASPART 100 [IU]/ML
0-10 INJECTION, SOLUTION INTRAVENOUS; SUBCUTANEOUS
Status: DISCONTINUED | OUTPATIENT
Start: 2024-10-28 | End: 2024-11-12 | Stop reason: HOSPADM

## 2024-10-28 RX ORDER — SODIUM CHLORIDE 0.9 % (FLUSH) 0.9 %
10 SYRINGE (ML) INJECTION EVERY 8 HOURS PRN
Status: DISCONTINUED | OUTPATIENT
Start: 2024-10-28 | End: 2024-11-12 | Stop reason: HOSPADM

## 2024-10-28 RX ORDER — TALC
6 POWDER (GRAM) TOPICAL NIGHTLY PRN
Status: DISCONTINUED | OUTPATIENT
Start: 2024-10-28 | End: 2024-11-12 | Stop reason: HOSPADM

## 2024-10-28 RX ORDER — CLOPIDOGREL BISULFATE 75 MG/1
75 TABLET ORAL DAILY
Status: DISCONTINUED | OUTPATIENT
Start: 2024-10-29 | End: 2024-11-02

## 2024-10-28 RX ORDER — ACETAMINOPHEN 325 MG/1
650 TABLET ORAL EVERY 4 HOURS PRN
Status: DISCONTINUED | OUTPATIENT
Start: 2024-10-28 | End: 2024-11-12 | Stop reason: HOSPADM

## 2024-10-28 RX ORDER — ONDANSETRON HYDROCHLORIDE 2 MG/ML
4 INJECTION, SOLUTION INTRAVENOUS EVERY 8 HOURS PRN
Status: DISCONTINUED | OUTPATIENT
Start: 2024-10-28 | End: 2024-11-12 | Stop reason: HOSPADM

## 2024-10-28 RX ORDER — OXYCODONE AND ACETAMINOPHEN 5; 325 MG/1; MG/1
1 TABLET ORAL EVERY 4 HOURS PRN
Status: DISCONTINUED | OUTPATIENT
Start: 2024-10-28 | End: 2024-10-29

## 2024-10-28 RX ORDER — CARVEDILOL 3.12 MG/1
6.25 TABLET ORAL 2 TIMES DAILY
Status: DISCONTINUED | OUTPATIENT
Start: 2024-10-28 | End: 2024-10-29

## 2024-10-28 RX ORDER — FUROSEMIDE 40 MG/1
40 TABLET ORAL 2 TIMES DAILY
Status: DISCONTINUED | OUTPATIENT
Start: 2024-10-28 | End: 2024-10-30

## 2024-10-28 RX ORDER — GABAPENTIN 100 MG/1
100 CAPSULE ORAL 3 TIMES DAILY
Status: DISCONTINUED | OUTPATIENT
Start: 2024-10-28 | End: 2024-10-29

## 2024-10-28 RX ORDER — CEFEPIME HYDROCHLORIDE 1 G/1
1 INJECTION, POWDER, FOR SOLUTION INTRAMUSCULAR; INTRAVENOUS
Status: DISCONTINUED | OUTPATIENT
Start: 2024-10-28 | End: 2024-10-29

## 2024-10-28 RX ORDER — ATORVASTATIN CALCIUM 40 MG/1
80 TABLET, FILM COATED ORAL DAILY
Status: DISCONTINUED | OUTPATIENT
Start: 2024-10-29 | End: 2024-11-12 | Stop reason: HOSPADM

## 2024-10-28 RX ORDER — AMOXICILLIN 250 MG
1 CAPSULE ORAL 2 TIMES DAILY PRN
Status: DISCONTINUED | OUTPATIENT
Start: 2024-10-28 | End: 2024-11-12 | Stop reason: HOSPADM

## 2024-10-28 RX ORDER — HEPARIN SODIUM 1000 [USP'U]/ML
3600 INJECTION, SOLUTION INTRAVENOUS; SUBCUTANEOUS
Status: DISCONTINUED | OUTPATIENT
Start: 2024-10-28 | End: 2024-11-12 | Stop reason: HOSPADM

## 2024-10-28 RX ORDER — SERTRALINE HYDROCHLORIDE 100 MG/1
100 TABLET, FILM COATED ORAL DAILY
Status: DISCONTINUED | OUTPATIENT
Start: 2024-10-29 | End: 2024-11-12 | Stop reason: HOSPADM

## 2024-10-28 RX ORDER — IBUPROFEN 200 MG
24 TABLET ORAL
Status: DISCONTINUED | OUTPATIENT
Start: 2024-10-28 | End: 2024-11-12 | Stop reason: HOSPADM

## 2024-10-28 RX ORDER — SODIUM,POTASSIUM PHOSPHATES 280-250MG
1 POWDER IN PACKET (EA) ORAL
Status: DISCONTINUED | OUTPATIENT
Start: 2024-10-28 | End: 2024-10-30

## 2024-10-28 RX ORDER — IBUPROFEN 200 MG
16 TABLET ORAL
Status: DISCONTINUED | OUTPATIENT
Start: 2024-10-28 | End: 2024-11-12 | Stop reason: HOSPADM

## 2024-10-28 RX ORDER — GLUCAGON 1 MG
1 KIT INJECTION
Status: DISCONTINUED | OUTPATIENT
Start: 2024-10-28 | End: 2024-11-12 | Stop reason: HOSPADM

## 2024-10-28 RX ADMIN — FUROSEMIDE 40 MG: 40 TABLET ORAL at 10:10

## 2024-10-28 RX ADMIN — TRAZODONE HYDROCHLORIDE 25 MG: 50 TABLET ORAL at 10:10

## 2024-10-28 RX ADMIN — INSULIN ASPART 2 UNITS: 100 INJECTION, SOLUTION INTRAVENOUS; SUBCUTANEOUS at 10:10

## 2024-10-28 RX ADMIN — CEFEPIME 1 G: 1 INJECTION, POWDER, FOR SOLUTION INTRAMUSCULAR; INTRAVENOUS at 10:10

## 2024-10-28 RX ADMIN — VANCOMYCIN HYDROCHLORIDE 1250 MG: 1.25 INJECTION, POWDER, LYOPHILIZED, FOR SOLUTION INTRAVENOUS at 10:10

## 2024-10-28 RX ADMIN — POTASSIUM & SODIUM PHOSPHATES POWDER PACK 280-160-250 MG 1 PACKET: 280-160-250 PACK at 10:10

## 2024-10-28 RX ADMIN — INSULIN GLARGINE 5 UNITS: 100 INJECTION, SOLUTION SUBCUTANEOUS at 10:10

## 2024-10-28 RX ADMIN — GABAPENTIN 100 MG: 100 CAPSULE ORAL at 10:10

## 2024-10-28 RX ADMIN — APIXABAN 5 MG: 5 TABLET, FILM COATED ORAL at 10:10

## 2024-10-29 PROBLEM — E87.20 METABOLIC ACIDOSIS: Status: RESOLVED | Noted: 2022-08-16 | Resolved: 2024-10-29

## 2024-10-29 PROBLEM — R65.21 SEPTIC SHOCK: Status: ACTIVE | Noted: 2022-01-17

## 2024-10-29 LAB
ALBUMIN SERPL BCP-MCNC: 2.1 G/DL (ref 3.5–5.2)
ALBUMIN SERPL BCP-MCNC: 2.1 G/DL (ref 3.5–5.2)
ALLENS TEST: ABNORMAL
ALP SERPL-CCNC: 530 U/L (ref 40–150)
ALP SERPL-CCNC: 555 U/L (ref 40–150)
ALT SERPL W/O P-5'-P-CCNC: 31 U/L (ref 10–44)
ALT SERPL W/O P-5'-P-CCNC: 35 U/L (ref 10–44)
AMMONIA PLAS-SCNC: 65 UMOL/L (ref 10–50)
ANION GAP SERPL CALC-SCNC: 12 MMOL/L (ref 8–16)
ANION GAP SERPL CALC-SCNC: 16 MMOL/L (ref 8–16)
AST SERPL-CCNC: 126 U/L (ref 10–40)
AST SERPL-CCNC: 137 U/L (ref 10–40)
BASOPHILS # BLD AUTO: 0.04 K/UL (ref 0–0.2)
BASOPHILS # BLD AUTO: 0.04 K/UL (ref 0–0.2)
BASOPHILS NFR BLD: 0.4 % (ref 0–1.9)
BASOPHILS NFR BLD: 0.5 % (ref 0–1.9)
BILIRUB SERPL-MCNC: 0.8 MG/DL (ref 0.1–1)
BILIRUB SERPL-MCNC: 0.8 MG/DL (ref 0.1–1)
BNP SERPL-MCNC: 1478 PG/ML (ref 0–99)
BUN SERPL-MCNC: 43 MG/DL (ref 6–20)
BUN SERPL-MCNC: 44 MG/DL (ref 6–20)
CALCIUM SERPL-MCNC: 8 MG/DL (ref 8.7–10.5)
CALCIUM SERPL-MCNC: 8.3 MG/DL (ref 8.7–10.5)
CHLORIDE SERPL-SCNC: 95 MMOL/L (ref 95–110)
CHLORIDE SERPL-SCNC: 97 MMOL/L (ref 95–110)
CO2 SERPL-SCNC: 20 MMOL/L (ref 23–29)
CO2 SERPL-SCNC: 24 MMOL/L (ref 23–29)
CREAT SERPL-MCNC: 5.3 MG/DL (ref 0.5–1.4)
CREAT SERPL-MCNC: 5.5 MG/DL (ref 0.5–1.4)
DIFFERENTIAL METHOD BLD: ABNORMAL
DIFFERENTIAL METHOD BLD: ABNORMAL
EOSINOPHIL # BLD AUTO: 0 K/UL (ref 0–0.5)
EOSINOPHIL # BLD AUTO: 0.1 K/UL (ref 0–0.5)
EOSINOPHIL NFR BLD: 0.4 % (ref 0–8)
EOSINOPHIL NFR BLD: 1 % (ref 0–8)
ERYTHROCYTE [DISTWIDTH] IN BLOOD BY AUTOMATED COUNT: 18.7 % (ref 11.5–14.5)
ERYTHROCYTE [DISTWIDTH] IN BLOOD BY AUTOMATED COUNT: 18.9 % (ref 11.5–14.5)
EST. GFR  (NO RACE VARIABLE): 8 ML/MIN/1.73 M^2
EST. GFR  (NO RACE VARIABLE): 9 ML/MIN/1.73 M^2
FIO2: 32 %
GGT SERPL-CCNC: 633 U/L (ref 8–55)
GLUCOSE SERPL-MCNC: 115 MG/DL (ref 70–110)
GLUCOSE SERPL-MCNC: 167 MG/DL (ref 70–110)
GLUCOSE SERPL-MCNC: 221 MG/DL (ref 70–110)
HAV IGM SERPL QL IA: ABNORMAL
HBV CORE AB SERPL QL IA: NORMAL
HBV CORE IGM SERPL QL IA: ABNORMAL
HBV CORE IGM SERPL QL IA: NORMAL
HBV SURFACE AG SERPL QL IA: REACTIVE
HCT VFR BLD AUTO: 29 % (ref 37–48.5)
HCT VFR BLD AUTO: 29.3 % (ref 37–48.5)
HCT VFR BLD CALC: 27.9 % (ref 36–54)
HCV AB SERPL QL IA: ABNORMAL
HGB BLD-MCNC: 8.9 G/DL (ref 12–16)
HGB BLD-MCNC: 9.1 G/DL (ref 12–16)
HGB BLD-MCNC: 9.1 G/DL (ref 9–18)
IMM GRANULOCYTES # BLD AUTO: 0.1 K/UL (ref 0–0.04)
IMM GRANULOCYTES # BLD AUTO: 0.18 K/UL (ref 0–0.04)
IMM GRANULOCYTES NFR BLD AUTO: 1.1 % (ref 0–0.5)
IMM GRANULOCYTES NFR BLD AUTO: 2 % (ref 0–0.5)
INR PPP: 1.4 (ref 0.8–1.2)
LACTATE SERPL-SCNC: 1.6 MMOL/L (ref 0.5–2.2)
LDH SERPL L TO P-CCNC: 1 MMOL/L (ref 0.4–1.3)
LIPASE SERPL-CCNC: 13 U/L (ref 4–60)
LPM: 3
LYMPHOCYTES # BLD AUTO: 0.6 K/UL (ref 1–4.8)
LYMPHOCYTES # BLD AUTO: 0.9 K/UL (ref 1–4.8)
LYMPHOCYTES NFR BLD: 6.5 % (ref 18–48)
LYMPHOCYTES NFR BLD: 9.3 % (ref 18–48)
MAGNESIUM SERPL-MCNC: 1.8 MG/DL (ref 1.6–2.6)
MAGNESIUM SERPL-MCNC: 2 MG/DL (ref 1.6–2.6)
MCH RBC QN AUTO: 32.5 PG (ref 27–31)
MCH RBC QN AUTO: 33.1 PG (ref 27–31)
MCHC RBC AUTO-ENTMCNC: 30.7 G/DL (ref 32–36)
MCHC RBC AUTO-ENTMCNC: 31.1 G/DL (ref 32–36)
MCV RBC AUTO: 106 FL (ref 82–98)
MCV RBC AUTO: 107 FL (ref 82–98)
MONOCYTES # BLD AUTO: 1.5 K/UL (ref 0.3–1)
MONOCYTES # BLD AUTO: 1.8 K/UL (ref 0.3–1)
MONOCYTES NFR BLD: 17.4 % (ref 4–15)
MONOCYTES NFR BLD: 19.2 % (ref 4–15)
NEUTROPHILS # BLD AUTO: 6.3 K/UL (ref 1.8–7.7)
NEUTROPHILS # BLD AUTO: 6.5 K/UL (ref 1.8–7.7)
NEUTROPHILS NFR BLD: 68.7 % (ref 38–73)
NEUTROPHILS NFR BLD: 73.5 % (ref 38–73)
NRBC BLD-RTO: 0 /100 WBC
NRBC BLD-RTO: 0 /100 WBC
PCO2 BLDA: 47.2 MMHG (ref 35–45)
PH SMN: 7.38 [PH] (ref 7.35–7.45)
PHOSPHATE SERPL-MCNC: 2.5 MG/DL (ref 2.7–4.5)
PHOSPHATE SERPL-MCNC: 3.3 MG/DL (ref 2.7–4.5)
PLATELET # BLD AUTO: 237 K/UL (ref 150–450)
PLATELET # BLD AUTO: 253 K/UL (ref 150–450)
PMV BLD AUTO: 11.6 FL (ref 9.2–12.9)
PMV BLD AUTO: 12.2 FL (ref 9.2–12.9)
PO2 BLDA: 69.6 MMHG (ref 80–100)
POC BASE DEFICIT: 2.4 MMOL/L (ref -2–2)
POC HCO3: 27.9 MMOL/L (ref 24–28)
POC IONIZED CALCIUM: 1.1 MMOL/L (ref 1.06–1.42)
POC PERFORMED BY: ABNORMAL
POC SATURATED O2: 95.3 % (ref 95–100)
POCT GLUCOSE: 128 MG/DL (ref 70–110)
POCT GLUCOSE: 221 MG/DL (ref 70–110)
POCT GLUCOSE: 229 MG/DL (ref 70–110)
POTASSIUM BLD-SCNC: 3.8 MMOL/L (ref 3.5–5.1)
POTASSIUM SERPL-SCNC: 4 MMOL/L (ref 3.5–5.1)
POTASSIUM SERPL-SCNC: 4.6 MMOL/L (ref 3.5–5.1)
PROT SERPL-MCNC: 7.4 G/DL (ref 6–8.4)
PROT SERPL-MCNC: 7.8 G/DL (ref 6–8.4)
PROTHROMBIN TIME: 14.6 SEC (ref 9–12.5)
RBC # BLD AUTO: 2.74 M/UL (ref 4–5.4)
RBC # BLD AUTO: 2.75 M/UL (ref 4–5.4)
SODIUM BLD-SCNC: 132 MMOL/L (ref 136–145)
SODIUM SERPL-SCNC: 131 MMOL/L (ref 136–145)
SODIUM SERPL-SCNC: 133 MMOL/L (ref 136–145)
SPECIMEN SOURCE: ABNORMAL
TROPONIN I SERPL DL<=0.01 NG/ML-MCNC: 0.03 NG/ML (ref 0–0.03)
TSH SERPL DL<=0.005 MIU/L-ACNC: 3.3 UIU/ML (ref 0.4–4)
VANCOMYCIN SERPL-MCNC: 18.1 UG/ML
WBC # BLD AUTO: 8.78 K/UL (ref 3.9–12.7)
WBC # BLD AUTO: 9.11 K/UL (ref 3.9–12.7)

## 2024-10-29 PROCEDURE — 87350 HEPATITIS BE AG IA: CPT | Mod: HCNC | Performed by: INTERNAL MEDICINE

## 2024-10-29 PROCEDURE — 25000003 PHARM REV CODE 250: Mod: HCNC | Performed by: INTERNAL MEDICINE

## 2024-10-29 PROCEDURE — 36415 COLL VENOUS BLD VENIPUNCTURE: CPT | Mod: HCNC,XB | Performed by: STUDENT IN AN ORGANIZED HEALTH CARE EDUCATION/TRAINING PROGRAM

## 2024-10-29 PROCEDURE — 80100014 HC HEMODIALYSIS 1:1: Mod: HCNC

## 2024-10-29 PROCEDURE — 5A09357 ASSISTANCE WITH RESPIRATORY VENTILATION, LESS THAN 24 CONSECUTIVE HOURS, CONTINUOUS POSITIVE AIRWAY PRESSURE: ICD-10-PCS | Performed by: INTERNAL MEDICINE

## 2024-10-29 PROCEDURE — 27000221 HC OXYGEN, UP TO 24 HOURS: Mod: HCNC

## 2024-10-29 PROCEDURE — 36415 COLL VENOUS BLD VENIPUNCTURE: CPT | Mod: HCNC | Performed by: INTERNAL MEDICINE

## 2024-10-29 PROCEDURE — 85025 COMPLETE CBC W/AUTO DIFF WBC: CPT | Mod: HCNC | Performed by: FAMILY MEDICINE

## 2024-10-29 PROCEDURE — 84443 ASSAY THYROID STIM HORMONE: CPT | Mod: HCNC

## 2024-10-29 PROCEDURE — 87486 CHLMYD PNEUM DNA AMP PROBE: CPT | Mod: HCNC

## 2024-10-29 PROCEDURE — 80053 COMPREHEN METABOLIC PANEL: CPT | Mod: 91,HCNC | Performed by: INTERNAL MEDICINE

## 2024-10-29 PROCEDURE — 83605 ASSAY OF LACTIC ACID: CPT | Mod: HCNC | Performed by: FAMILY MEDICINE

## 2024-10-29 PROCEDURE — 85014 HEMATOCRIT: CPT | Mod: HCNC

## 2024-10-29 PROCEDURE — 82977 ASSAY OF GGT: CPT | Mod: HCNC

## 2024-10-29 PROCEDURE — 85025 COMPLETE CBC W/AUTO DIFF WBC: CPT | Mod: 91,HCNC | Performed by: INTERNAL MEDICINE

## 2024-10-29 PROCEDURE — 25000003 PHARM REV CODE 250: Mod: HCNC | Performed by: FAMILY MEDICINE

## 2024-10-29 PROCEDURE — 82140 ASSAY OF AMMONIA: CPT | Mod: HCNC | Performed by: STUDENT IN AN ORGANIZED HEALTH CARE EDUCATION/TRAINING PROGRAM

## 2024-10-29 PROCEDURE — 99900035 HC TECH TIME PER 15 MIN (STAT): Mod: HCNC

## 2024-10-29 PROCEDURE — 36415 COLL VENOUS BLD VENIPUNCTURE: CPT | Mod: HCNC,XB

## 2024-10-29 PROCEDURE — 87040 BLOOD CULTURE FOR BACTERIA: CPT | Mod: 59,HCNC | Performed by: STUDENT IN AN ORGANIZED HEALTH CARE EDUCATION/TRAINING PROGRAM

## 2024-10-29 PROCEDURE — 80074 ACUTE HEPATITIS PANEL: CPT | Mod: HCNC | Performed by: INTERNAL MEDICINE

## 2024-10-29 PROCEDURE — 84132 ASSAY OF SERUM POTASSIUM: CPT | Mod: HCNC

## 2024-10-29 PROCEDURE — 86704 HEP B CORE ANTIBODY TOTAL: CPT | Mod: HCNC | Performed by: INTERNAL MEDICINE

## 2024-10-29 PROCEDURE — 83880 ASSAY OF NATRIURETIC PEPTIDE: CPT | Mod: HCNC

## 2024-10-29 PROCEDURE — 36600 WITHDRAWAL OF ARTERIAL BLOOD: CPT | Mod: HCNC

## 2024-10-29 PROCEDURE — 80053 COMPREHEN METABOLIC PANEL: CPT | Mod: HCNC | Performed by: FAMILY MEDICINE

## 2024-10-29 PROCEDURE — 63600175 PHARM REV CODE 636 W HCPCS: Mod: HCNC | Performed by: INTERNAL MEDICINE

## 2024-10-29 PROCEDURE — 80202 ASSAY OF VANCOMYCIN: CPT | Mod: HCNC | Performed by: INTERNAL MEDICINE

## 2024-10-29 PROCEDURE — 27100171 HC OXYGEN HIGH FLOW UP TO 24 HOURS: Mod: HCNC

## 2024-10-29 PROCEDURE — 84100 ASSAY OF PHOSPHORUS: CPT | Mod: 91,HCNC | Performed by: INTERNAL MEDICINE

## 2024-10-29 PROCEDURE — 85610 PROTHROMBIN TIME: CPT | Mod: HCNC

## 2024-10-29 PROCEDURE — 84484 ASSAY OF TROPONIN QUANT: CPT | Mod: HCNC

## 2024-10-29 PROCEDURE — 20000000 HC ICU ROOM: Mod: HCNC

## 2024-10-29 PROCEDURE — 94660 CPAP INITIATION&MGMT: CPT | Mod: HCNC

## 2024-10-29 PROCEDURE — 82803 BLOOD GASES ANY COMBINATION: CPT | Mod: HCNC

## 2024-10-29 PROCEDURE — 84100 ASSAY OF PHOSPHORUS: CPT | Mod: HCNC | Performed by: FAMILY MEDICINE

## 2024-10-29 PROCEDURE — 83735 ASSAY OF MAGNESIUM: CPT | Mod: 91,HCNC | Performed by: INTERNAL MEDICINE

## 2024-10-29 PROCEDURE — 82330 ASSAY OF CALCIUM: CPT | Mod: HCNC

## 2024-10-29 PROCEDURE — 84295 ASSAY OF SERUM SODIUM: CPT | Mod: HCNC

## 2024-10-29 PROCEDURE — 63600175 PHARM REV CODE 636 W HCPCS: Mod: HCNC | Performed by: FAMILY MEDICINE

## 2024-10-29 PROCEDURE — 83690 ASSAY OF LIPASE: CPT | Mod: HCNC

## 2024-10-29 PROCEDURE — 94760 N-INVAS EAR/PLS OXIMETRY 1: CPT | Mod: HCNC

## 2024-10-29 PROCEDURE — 83605 ASSAY OF LACTIC ACID: CPT | Mod: HCNC

## 2024-10-29 PROCEDURE — 87341 HEP B SURFACE AG NEUTRLZJ IA: CPT | Mod: HCNC | Performed by: INTERNAL MEDICINE

## 2024-10-29 PROCEDURE — 83735 ASSAY OF MAGNESIUM: CPT | Mod: HCNC | Performed by: FAMILY MEDICINE

## 2024-10-29 PROCEDURE — 27000190 HC CPAP FULL FACE MASK W/VALVE: Mod: HCNC

## 2024-10-29 PROCEDURE — 36415 COLL VENOUS BLD VENIPUNCTURE: CPT | Mod: HCNC,XB | Performed by: FAMILY MEDICINE

## 2024-10-29 RX ORDER — NOREPINEPHRINE BITARTRATE/D5W 4MG/250ML
0-3 PLASTIC BAG, INJECTION (ML) INTRAVENOUS CONTINUOUS
Status: DISCONTINUED | OUTPATIENT
Start: 2024-10-29 | End: 2024-10-30

## 2024-10-29 RX ORDER — SODIUM CHLORIDE 9 MG/ML
INJECTION, SOLUTION INTRAVENOUS ONCE
Status: DISCONTINUED | OUTPATIENT
Start: 2024-10-29 | End: 2024-11-01

## 2024-10-29 RX ORDER — MUPIROCIN 20 MG/G
OINTMENT TOPICAL 2 TIMES DAILY
Status: DISPENSED | OUTPATIENT
Start: 2024-10-29 | End: 2024-11-02

## 2024-10-29 RX ORDER — METRONIDAZOLE 500 MG/1
500 TABLET ORAL EVERY 8 HOURS
Status: DISCONTINUED | OUTPATIENT
Start: 2024-10-29 | End: 2024-10-29

## 2024-10-29 RX ORDER — POLYETHYLENE GLYCOL 3350 17 G/17G
17 POWDER, FOR SOLUTION ORAL
Status: DISCONTINUED | OUTPATIENT
Start: 2024-10-29 | End: 2024-11-12 | Stop reason: HOSPADM

## 2024-10-29 RX ORDER — HYDROCODONE BITARTRATE AND ACETAMINOPHEN 5; 325 MG/1; MG/1
1 TABLET ORAL EVERY 6 HOURS PRN
Status: DISCONTINUED | OUTPATIENT
Start: 2024-10-29 | End: 2024-10-30

## 2024-10-29 RX ORDER — SODIUM CHLORIDE 9 MG/ML
INJECTION, SOLUTION INTRAVENOUS
Status: DISCONTINUED | OUTPATIENT
Start: 2024-10-29 | End: 2024-11-12 | Stop reason: HOSPADM

## 2024-10-29 RX ORDER — HYDROCODONE BITARTRATE AND ACETAMINOPHEN 10; 325 MG/1; MG/1
1 TABLET ORAL EVERY 8 HOURS PRN
Status: DISCONTINUED | OUTPATIENT
Start: 2024-10-29 | End: 2024-10-30

## 2024-10-29 RX ORDER — METRONIDAZOLE 500 MG/1
500 TABLET ORAL ONCE
Status: COMPLETED | OUTPATIENT
Start: 2024-10-29 | End: 2024-10-29

## 2024-10-29 RX ADMIN — INSULIN ASPART 4 UNITS: 100 INJECTION, SOLUTION INTRAVENOUS; SUBCUTANEOUS at 05:10

## 2024-10-29 RX ADMIN — FUROSEMIDE 40 MG: 40 TABLET ORAL at 08:10

## 2024-10-29 RX ADMIN — POTASSIUM & SODIUM PHOSPHATES POWDER PACK 280-160-250 MG 1 PACKET: 280-160-250 PACK at 06:10

## 2024-10-29 RX ADMIN — VANCOMYCIN HYDROCHLORIDE 500 MG: 500 INJECTION, POWDER, LYOPHILIZED, FOR SOLUTION INTRAVENOUS at 08:10

## 2024-10-29 RX ADMIN — METRONIDAZOLE 500 MG: 500 TABLET ORAL at 05:10

## 2024-10-29 RX ADMIN — APIXABAN 5 MG: 5 TABLET, FILM COATED ORAL at 08:10

## 2024-10-29 RX ADMIN — HYDROCODONE BITARTRATE AND ACETAMINOPHEN 1 TABLET: 10; 325 TABLET ORAL at 05:10

## 2024-10-29 RX ADMIN — MUPIROCIN: 20 OINTMENT TOPICAL at 01:10

## 2024-10-29 RX ADMIN — HEPARIN SODIUM 3600 UNITS: 1000 INJECTION, SOLUTION INTRAVENOUS; SUBCUTANEOUS at 01:10

## 2024-10-29 RX ADMIN — POTASSIUM & SODIUM PHOSPHATES POWDER PACK 280-160-250 MG 1 PACKET: 280-160-250 PACK at 08:10

## 2024-10-29 RX ADMIN — POTASSIUM & SODIUM PHOSPHATES POWDER PACK 280-160-250 MG 1 PACKET: 280-160-250 PACK at 12:10

## 2024-10-29 RX ADMIN — POTASSIUM & SODIUM PHOSPHATES POWDER PACK 280-160-250 MG 1 PACKET: 280-160-250 PACK at 05:10

## 2024-10-29 RX ADMIN — MUPIROCIN: 20 OINTMENT TOPICAL at 08:10

## 2024-10-29 RX ADMIN — ACETAMINOPHEN 650 MG: 325 TABLET ORAL at 12:10

## 2024-10-29 RX ADMIN — PIPERACILLIN SODIUM AND TAZOBACTAM SODIUM 4.5 G: 4; .5 INJECTION, POWDER, LYOPHILIZED, FOR SOLUTION INTRAVENOUS at 04:10

## 2024-10-29 RX ADMIN — INSULIN ASPART 2 UNITS: 100 INJECTION, SOLUTION INTRAVENOUS; SUBCUTANEOUS at 11:10

## 2024-10-29 RX ADMIN — INSULIN GLARGINE 5 UNITS: 100 INJECTION, SOLUTION SUBCUTANEOUS at 09:10

## 2024-10-29 NOTE — HPI
Patient is a 59-year-old female with a known past medical history of hypertension, hyperlipidemia, diabetes mellitus type 2, ESRD on HD, and deconditioning that presents the ER after being on floor throughout the evening.     Patient presents after being found on the floor by a neighbor today.  Patient reports that she got out of bed to change her pad and diaper but was unable to get back into bed, staying on the floor throughout the evening.  Patient's  who normally cares for her, was arrested yesterday and is in correction and patient was unable to care for herself.  When patient was found by neighbor on floor, EMS was called and patient was brought to the ER for further evaluation.  On arrival to ER, patient in no acute distress.  Labs essentially within normal limits.  Patient last dialyzed on Thursday and no signs of fluid overload or electrolyte abnormalities.  Patient has no family NS able to assist her at home other than her  who is currently incarcerated.  Patient admitted for social problems and hemodialysis.    Patient due to her open wounds and CT finding of her left thigh - emphysema can not rule out infection -

## 2024-10-29 NOTE — SUBJECTIVE & OBJECTIVE
Current Facility-Administered Medications on File Prior to Encounter   Medication    [DISCONTINUED] acetaminophen tablet 650 mg    [DISCONTINUED] apixaban tablet 5 mg    [DISCONTINUED] atorvastatin tablet 80 mg    [DISCONTINUED] carvediloL tablet 6.25 mg    [DISCONTINUED] ceFEPIme injection 1 g    [DISCONTINUED] ceFEPIme injection 1 g    [DISCONTINUED] clopidogreL tablet 75 mg    [DISCONTINUED] dextrose 10% bolus 125 mL 125 mL    [DISCONTINUED] dextrose 10% bolus 250 mL 250 mL    [DISCONTINUED] furosemide tablet 40 mg    [DISCONTINUED] gabapentin capsule 100 mg    [DISCONTINUED] glucagon (human recombinant) injection 1 mg    [DISCONTINUED] glucose chewable tablet 16 g    [DISCONTINUED] glucose chewable tablet 24 g    [DISCONTINUED] heparin (porcine) injection 3,600 Units    [DISCONTINUED] insulin aspart U-100 pen 0-10 Units    [DISCONTINUED] insulin glargine U-100 (Lantus) pen 5 Units    [DISCONTINUED] melatonin tablet 6 mg    [DISCONTINUED] naloxone 0.4 mg/mL injection 0.02 mg    [DISCONTINUED] ondansetron injection 4 mg    [DISCONTINUED] oxyCODONE-acetaminophen 5-325 mg per tablet 1 tablet    [DISCONTINUED] potassium, sodium phosphates 280-160-250 mg packet 1 packet    [DISCONTINUED] senna-docusate 8.6-50 mg per tablet 1 tablet    [DISCONTINUED] sertraline tablet 100 mg    [DISCONTINUED] sodium chloride 0.9% flush 10 mL    [DISCONTINUED] trazodone split tablet 25 mg    [DISCONTINUED] vancomycin - pharmacy to dose    [DISCONTINUED] vancomycin 1,500 mg in D5W 250 mL IVPB (admixture device)     Current Outpatient Medications on File Prior to Encounter   Medication Sig    acetaminophen (TYLENOL) 500 MG tablet Take 2 tablets (1,000 mg total) by mouth every 8 (eight) hours as needed for Pain. (Patient taking differently: Take 650 mg by mouth every 8 (eight) hours as needed for Pain.)    allopurinoL (ZYLOPRIM) 100 MG tablet Take 0.5 tablets (50 mg total) by mouth every other day.    apixaban (ELIQUIS) 5 mg Tab Take 1  "tablet (5 mg total) by mouth 2 (two) times daily.    atorvastatin (LIPITOR) 80 MG tablet Take 1 tablet (80 mg total) by mouth once daily.    blood sugar diagnostic Strp 1 each by Misc.(Non-Drug; Combo Route) route 3 (three) times daily.    blood sugar diagnostic Strp Use to check blood glucose 2 (two) times a day.    blood-glucose meter Misc TEST TWICE DAILY    blood-glucose sensor (DEXCOM G7 SENSOR) Radha 1 each by Misc.(Non-Drug; Combo Route) route once daily.    calcitRIOL (ROCALTROL) 0.5 MCG Cap Take 1 capsule (0.5 mcg total) by mouth once daily.    calcium acetate,phosphat bind, (PHOSLO) 667 mg capsule Take 1 capsule (667 mg total) by mouth 3 (three) times daily with meals.    carvediloL (COREG) 6.25 MG tablet Take 1 tablet (6.25 mg total) by mouth 2 (two) times daily.    clopidogreL (PLAVIX) 75 mg tablet Take 1 tablet (75 mg total) by mouth once daily.    dextrose (GLUTOSE) 40 % gel Take 37.5 mLs (15,000 mg total) by mouth once. for 1 dose    epoetin nina (PROCRIT) 4,000 unit/mL injection Inject 1 mL (4,000 Units total) into the skin every Mon, Wed, Fri.    famotidine (PEPCID) 20 MG tablet Take 1 tablet (20 mg total) by mouth once daily. (Patient not taking: Reported on 10/15/2024)    furosemide (LASIX) 40 MG tablet Take 1 tablet (40 mg total) by mouth 2 (two) times a day.    hydrALAZINE (APRESOLINE) 50 MG tablet Take 1 tablet (50 mg total) by mouth 3 (three) times daily.    insulin aspart U-100 (NOVOLOG) 100 unit/mL (3 mL) InPn pen Inject 3 Units into the skin 3 (three) times daily with meals. (Patient not taking: Reported on 10/15/2024)    insulin glargine U-100, Lantus, (LANTUS U-100 INSULIN) 100 unit/mL injection Inject 8 Units into the skin every evening.    lancets 33 gauge Misc TEST 3 TIMES DAILY    multivitamin (ONE DAILY MULTIVITAMIN) per tablet Take 1 tablet by mouth once daily.    pen needle, diabetic 32 gauge x 5/32" Ndle 1 Units by Misc.(Non-Drug; Combo Route) route before meals as needed (SSI).    " "pen needle, diabetic 33 gauge x 5/32" Ndle 1 each by Misc.(Non-Drug; Combo Route) route 3 (three) times daily.    pregabalin (LYRICA) 50 MG capsule Take 1 capsule (50 mg total) by mouth 3 (three) times a week.    sertraline (ZOLOFT) 100 MG tablet Take 1 tablet (100 mg total) by mouth once daily.    sodium bicarbonate 650 MG tablet Take 1 tablet (650 mg total) by mouth 3 (three) times daily.    traZODone (DESYREL) 50 MG tablet Take 0.5 tablets (25 mg total) by mouth every evening.    wound dressings (TRIAD WOUND DRESSING) Pste Apply 2 g topically once daily.    [DISCONTINUED] lancing device Misc 1 Device by Misc.(Non-Drug; Combo Route) route 2 (two) times daily with meals.       Review of patient's allergies indicates:   Allergen Reactions    Ciprofloxacin Itching    Iodine      Kidney injury    Pcn [penicillins]      Rash; tolerated ceftriaxone on 1/13/20       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    Osteomyelitis     Osteomyelitis of left foot 04/30/2021    Ulcer of left foot     Vaginal delivery     x1     Past Surgical History:   Procedure Laterality Date    ABOVE-KNEE AMPUTATION Left 5/18/2021    Procedure: AMPUTATION, ABOVE KNEE;  Surgeon: Teddy Huber MD;  Location: [facility] OR [address] [place] [address];  Service: Vascular;  Laterality: Left;    ABOVE-KNEE AMPUTATION Right 3/18/2022    Procedure: AMPUTATION, ABOVE KNEE;  Surgeon: DAYNE Florez II, MD;  [facility];  Service: Vascular;  Laterality: Right;    Angiogram - Right Extremity Right 7/9/15    angiogram-left leg  10/6/15    ANGIOGRAPHY OF LOWER EXTREMITY Left 4/29/2021    Procedure: ANGIOGRAM, LOWER EXTREMITY;  Surgeon: Teddy Huber MD;  [facility];  " Service: Vascular;  Laterality: Left;    BELOW KNEE AMPUTATION OF LOWER EXTREMITY Right 12/28/2021    Procedure: AMPUTATION, BELOW KNEE;  Surgeon: Kaitlyn Rojas MD;  Location: [facility];  Service: General;  Laterality: Right;    CATHETERIZATION OF BOTH LEFT AND RIGHT HEART N/A 12/18/2019    Procedure: CATHETERIZATION, HEART, BOTH LEFT AND RIGHT;  Surgeon: Que Fernando III, MD;  [facility];  Service: Cardiology;  Laterality: N/A;    CORONARY ANGIOGRAPHY N/A 12/18/2019    Procedure: ANGIOGRAM, CORONARY ARTERY;  Surgeon: Que Fernando III, MD;  [facility];  Service: Cardiology;  Laterality: N/A;    CORONARY ANGIOGRAPHY INCLUDING BYPASS GRAFTS WITH CATHETERIZATION OF LEFT HEART N/A 7/28/2020    Procedure: ANGIOGRAM, CORONARY, INCLUDING BYPASS GRAFT, WITH LEFT HEART CATHETERIZATION, 9 am;  Surgeon: Rachel Easley MD;  Location: [facility] CATH LAB;  Service: Cardiology;  Laterality: N/A;    CORONARY ARTERY BYPASS GRAFT (CABG) N/A 1/14/2020    Procedure: CORONARY ARTERY BYPASS GRAFT (CABG) x 1     Off Pump;  Surgeon: Huang Altamirano MD;  Location: [address];  Service: Cardiovascular;  Laterality: N/A;    CREATION OF FEMORAL-TIBIAL ARTERY BYPASS Left 4/29/2021    Procedure: CREATION, BYPASS, ARTERIAL, FEMORAL TO ANTERIOR TIBIAL;  Surgeon: Teddy Huber MD;  Location: [address];  Service: Vascular;  Laterality: Left;    CREATION OF FEMOROPOPLITEAL ARTERIAL BYPASS USING GRAFT Left 8/18/2020    Procedure: CREATION, BYPASS, ARTERIAL, FEMORAL TO POPLITEAL, USING GRAFT, LEFT LOWER EXTREMITY;  Surgeon: Teddy Huber MD;  Location: [facility];  Service: Vascular;  Laterality: Left;  REQUEST 7:15 A.M. START----COVID NEGATIVE ON 8/17  1ST CASE STARTE PER LEANA ON 8/7/2020 @ 942AM-LO  RN PREOP 8/12/2020   T/S-----CLEARED BY CARDS-------PENDING INSURANCE    DEBRIDEMENT OF FOOT Left 9/8/2020    Procedure: DEBRIDEMENT, FOOT;  Surgeon: Rosio Mayes DPM;  Location: [facility]  OR;  Service: Podiatry;  Laterality: Left;  request neoxx .   RN Pre Op 9-4-2020, Covid negative on 9/5/20. C A    DEBRIDEMENT OF FOOT  3/4/2021    Procedure: DEBRIDEMENT, FOOT;  Surgeon: Teddy Huber MD;  Location: [facility] OR;  Service: Vascular;;    DEBRIDEMENT OF FOOT Left 3/9/2021    Procedure: DEBRIDEMENT, FOOT, bone biopsy;  Surgeon: Rosio Mayes DPM;  Location: [facility] OR;  Service: Podiatry;  Laterality: Left;  Request neoxx---COVID IN AM  REQUESTING NOON START  RN Phone Pre op.On Blood thinners Plavix and Eliquis.  Covid am of surgery. C A    DEBRIDEMENT OF FOOT Left 5/4/2021    Procedure: DEBRIDEMENT, FOOT;  Surgeon: Farooq Morley DPM;  Location: [facility] 2ND FLR;  Service: Podiatry;  Laterality: Left;    INSERTION OF TUNNELED CENTRAL VENOUS HEMODIALYSIS CATHETER N/A 1/27/2020    Procedure: Insertion, Catheter, Central Venous, Hemodialysis;  Surgeon: ESTEBAN Gomez III, MD;  Location: [facility] CATH LAB;  Service: Peripheral Vascular;  Laterality: N/A;    INSERTION OF TUNNELED CENTRAL VENOUS HEMODIALYSIS CATHETER  5/10/2023    Procedure: Insertion, Catheter, Central Venous, Hemodialysis;  Surgeon: Romulo Queen MD;  Location: [facility] CATH LAB;  Service: Cardiology;;    PERCUTANEOUS TRANSLUMINAL ANGIOPLASTY N/A 3/4/2021    Procedure: PTA (ANGIOPLASTY, PERCUTANEOUS, TRANSLUMINAL);  Surgeon: Teddy Huber MD;  Location: [facility] OR;  Service: Vascular;  Laterality: N/A;    REMOVAL OF ARTERIOVENOUS GRAFT Left 5/27/2021    Procedure: REMOVAL, GRAFT, LEFT LOWER EXTREMITY, WOUND EXPLORATION;  Surgeon: Teddy Huber MD;  Location: [facility] 2ND FLR;  Service: Vascular;  Laterality: Left;    REMOVAL OF NAIL OF DIGIT Left 3/9/2021    Procedure: REMOVAL, NAIL, DIGIT;  Surgeon: Rosio Mayes DPM;  Location: [facility] OR;  Service: Podiatry;  Laterality: Left;    RIGHT HEART CATHETERIZATION Right 5/10/2023    Procedure: INSERTION, CATHETER, RIGHT HEART;  Surgeon: Romulo Queen MD;  Location: [facility] CATH  LAB;  Service: Cardiology;  Laterality: Right;    THROMBECTOMY Left 3/4/2021    Procedure: THROMBECTOMY, LEFT LOWER EXTREMITY BYPASS GRAFT, ANGIOGRAM, POSSIBLE INTERVENTION, POSSIBLE LEFT LOWER EXTREMITY BYPASS;  Surgeon: Teddy Huber MD;  Location: [facility] OR;  Service: Vascular;  Laterality: Left;    THROMBECTOMY Left 4/29/2021    Procedure: GRAFT THROMBECTOMY, LEFT LOWER EXTREMITY;  Surgeon: Teddy Huber MD;  [facility];  Service: Vascular;  Laterality: Left;  14.5 min  1179.85 mGy  341.01 Gycm2  240 ml dye    THROMBECTOMY  10/22/2021    Procedure: THROMBECTOMY;  Surgeon: Saad Arenas MD;  Location: [facility] CATH LAB/EP;  Service: Cardiology;;     Family History       Problem Relation (Age of Onset)    Diabetes Mother, Father, Paternal Grandmother    Heart disease Maternal Grandmother    No Known Problems Maternal Grandfather, Paternal Grandfather          Tobacco Use    Smoking status: Former    Smokeless tobacco: Never   Substance and Sexual Activity    Alcohol use: No    Drug use: Yes     Types: Marijuana     Comment: occassional    Sexual activity: Yes     Partners: Male     Review of Systems   Reason unable to perform ROS: on Bipap.     Objective:     Vital Signs (Most Recent):  Temp: 98.3 °F (36.8 °C) (10/29/24 1115)  Pulse: 77 (10/29/24 1145)  Resp: 15 (10/29/24 1145)  BP: (!) 92/58 (10/29/24 1133)  SpO2: 100 % (10/29/24 1145) Vital Signs (24h Range):  Temp:  [98.1 °F (36.7 °C)-101.6 °F (38.7 °C)] 98.3 °F (36.8 °C)  Pulse:  [] 77  Resp:  [11-21] 15  SpO2:  [85 %-100 %] 100 %  BP: ()/(37-77) 92/58     Weight: 68 kg (150 lb)  Body mass index is 45.77 kg/m².     Physical Exam  Constitutional:       General: She is not in acute distress.     Appearance: Normal appearance.   HENT:      Head: Normocephalic and atraumatic.   Eyes:      Extraocular Movements: Extraocular movements intact.   Cardiovascular:      Rate and Rhythm: Normal rate.   Pulmonary:      Effort: Pulmonary  effort is normal. No respiratory distress.   Musculoskeletal:         General: Normal range of motion.      Comments: B/l AKA   Skin:     General: Skin is warm and dry.      Comments: Wound breakdown at medial aspect of L AKA. Some surrounding induration. No noted fluctuance or drainage   Neurological:      Mental Status: She is alert.            I have reviewed all pertinent lab results within the past 24 hours.  CBC:   Recent Labs   Lab 10/29/24  0856   WBC 9.11   RBC 2.75*   HGB 9.1*   HCT 29.3*      *   MCH 33.1*   MCHC 31.1*     CMP:   Recent Labs   Lab 10/29/24  0856   *   CALCIUM 8.3*   ALBUMIN 2.1*   PROT 7.4   *   K 4.0   CO2 24   CL 97   BUN 44*   CREATININE 5.5*   ALKPHOS 530*   ALT 35   *   BILITOT 0.8       Significant Diagnostics:  I have reviewed all pertinent imaging results/findings within the past 24 hours.

## 2024-10-29 NOTE — NURSING
RAPID RESPONSE NURSE PROACTIVE ROUNDING NOTE       Time of Visit: 828    Admit Date: 10/28/2024  LOS: 1  Code Status: Full Code   Date of Visit: 10/29/2024  : 1965  Age: 59 y.o.  Sex: female  Race: Black or   Bed: K555/K555 A:   MRN: [id number]  Was the patient discharged from an ICU this admission? No   Was the patient discharged from a PACU within last 24 hours? No   Did the patient receive conscious sedation/general anesthesia in last 24 hours? No   Was the patient in the ED within the past 24 hours? No   Was the patient on NIPPV within the past 24 hours? No   Attending Physician: Shae Starkey MD  Primary Service: Family Medicine,Hospitalist   Time spent at the bedside: 15 -30 min    SITUATION    Notified by overhead page  Reason for alert: MET    Diagnosis: Cellulitis of left thigh   has a past medical history of Anxiety, Chronic pain syndrome, CKD (chronic kidney disease), stage III, Depression, Diabetes mellitus, type 2, GERD (gastroesophageal reflux disease), Hyperemesis, Hypokalemia, Infection of below knee amputation stump, Osteomyelitis, Osteomyelitis of left foot, Ulcer of left foot, and Vaginal delivery.    Last Vitals:  Temp: 98.1 °F (36.7 °C) (10/29 0815)  Pulse: 87 (10/29 0840)  Resp: 14 (10/29 0840)  BP: 81/37 (10/29 0815)  SpO2: 98 % (10/29 0840)    24 Hour Vitals Range:  Temp:  [98.1 °F (36.7 °C)-100 °F (37.8 °C)]   Pulse:  []   Resp:  [14-20]   BP: ()/(37-66)   SpO2:  [91 %-100 %]     Clinical Issues: Neuro    ASSESSMENT/INTERVENTIONS    Responded to MET. Pt difficult to arouse and hypotensive (81/37) . BG WNL. Pt sats 86% on RA, placed on 2L NC. CXR and ABG ordered. Pt transferred to ICU for closer monitoring.    RECOMMENDATIONS  Monitor VS and respiratory status      Discussed plan of care with bedside RNHeidi    PROVIDER ESCALATION    Physician escalation: Yes    Orders received and case discussed with Dr. Velasquez .    Disposition:Tx in ICU bed  555    FOLLOW UP    Call back the Rapid Response NurseCarrington at [phone] for additional questions or concerns.

## 2024-10-29 NOTE — H&P
[facility] Medicine  History & Physical    Patient Name: Suyapa Connelly  MRN: [id number]  Patient Class: IP- Inpatient  Admission Date: 10/28/2024  Attending Physician: Anton Rosenbaum MD   Primary Care Provider: Vanessa Noel MD         Patient information was obtained from patient and ER records.     Subjective:     Principal Problem:Cellulitis of left thigh    Chief Complaint: No chief complaint on file.       HPI: Patient is a 59-year-old female with a known past medical history of hypertension, hyperlipidemia, diabetes mellitus type 2, ESRD on HD, and deconditioning that presents the ER after being on floor throughout the evening.     Patient presents after being found on the floor by a neighbor today.  Patient reports that she got out of bed to change her pad and diaper but was unable to get back into bed, staying on the floor throughout the evening.  Patient's  who normally cares for her, was arrested yesterday and is in intermediate and patient was unable to care for herself.  When patient was found by neighbor on floor, EMS was called and patient was brought to the ER for further evaluation.  On arrival to ER, patient in no acute distress.  Labs essentially within normal limits.  Patient last dialyzed on Thursday and no signs of fluid overload or electrolyte abnormalities.  Patient has no family NS able to assist her at home other than her  who is currently incarcerated.  Patient admitted for social problems and hemodialysis.    Patient due to her open wounds and CT finding of her left thigh - emphysema can not rule out infection -     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    Osteomyelitis      Osteomyelitis of left foot 04/30/2021    Ulcer of left foot     Vaginal delivery     x1       Past Surgical History:   Procedure Laterality Date    ABOVE-KNEE AMPUTATION Left 5/18/2021    Procedure: AMPUTATION, ABOVE KNEE;  Surgeon: Teddy Huber MD;  Location: [address];  Service: Vascular;  Laterality: Left;    ABOVE-KNEE AMPUTATION Right 3/18/2022    Procedure: AMPUTATION, ABOVE KNEE;  Surgeon: DAYNE Florez II, MD;  Location: [address];  Service: Vascular;  Laterality: Right;    Angiogram - Right Extremity Right 7/9/15    angiogram-left leg  10/6/15    ANGIOGRAPHY OF LOWER EXTREMITY Left 4/29/2021    Procedure: ANGIOGRAM, LOWER EXTREMITY;  Surgeon: Teddy Huber MD;  Location: [address];  Service: Vascular;  Laterality: Left;    BELOW KNEE AMPUTATION OF LOWER EXTREMITY Right 12/28/2021    Procedure: AMPUTATION, BELOW KNEE;  Surgeon: Kaitlyn Rojas MD;  Location: [facility];  Service: General;  Laterality: Right;    CATHETERIZATION OF BOTH LEFT AND RIGHT HEART N/A 12/18/2019    Procedure: CATHETERIZATION, HEART, BOTH LEFT AND RIGHT;  Surgeon: Que Fernando III, MD;  Location: [facility] CATH LAB;  Service: Cardiology;  Laterality: N/A;    CORONARY ANGIOGRAPHY N/A 12/18/2019    Procedure: ANGIOGRAM, CORONARY ARTERY;  Surgeon: Que Fernando III, MD;  Location: [facility] CATH LAB;  Service: Cardiology;  Laterality: N/A;    CORONARY ANGIOGRAPHY INCLUDING BYPASS GRAFTS WITH CATHETERIZATION OF LEFT HEART N/A 7/28/2020    Procedure: ANGIOGRAM, CORONARY, INCLUDING BYPASS GRAFT, WITH LEFT HEART CATHETERIZATION, 9 am;  Surgeon: Rachel Easley MD;  Location: [facility] CATH LAB;  Service: Cardiology;  Laterality: N/A;    CORONARY ARTERY BYPASS GRAFT (CABG) N/A 1/14/2020    Procedure: CORONARY ARTERY BYPASS GRAFT (CABG) x 1     Off Pump;  Surgeon: Huang Altamirano MD;  Location: [address];  Service: Cardiovascular;  Laterality: N/A;    CREATION OF FEMORAL-TIBIAL ARTERY BYPASS Left  4/29/2021    Procedure: CREATION, BYPASS, ARTERIAL, FEMORAL TO ANTERIOR TIBIAL;  Surgeon: Teddy Huber MD;  [facility];  Service: Vascular;  Laterality: Left;    CREATION OF FEMOROPOPLITEAL ARTERIAL BYPASS USING GRAFT Left 8/18/2020    Procedure: CREATION, BYPASS, ARTERIAL, FEMORAL TO POPLITEAL, USING GRAFT, LEFT LOWER EXTREMITY;  Surgeon: Teddy Huber MD;  Location: [facility] OR;  Service: Vascular;  Laterality: Left;  REQUEST 7:15 A.M. START----COVID NEGATIVE ON 8/17  1ST CASE STARTE PER LEANA ON 8/7/2020 @ 942AM-LO  RN PREOP 8/12/2020   T/S-----CLEARED BY CARDS-------PENDING INSURANCE    DEBRIDEMENT OF FOOT Left 9/8/2020    Procedure: DEBRIDEMENT, FOOT;  Surgeon: Rosio Mayes DPM;  Location: [facility] OR;  Service: Podiatry;  Laterality: Left;  request neoxx .   RN Pre Op 9-4-2020, Covid negative on 9/5/20. C A    DEBRIDEMENT OF FOOT  3/4/2021    Procedure: DEBRIDEMENT, FOOT;  Surgeon: Teddy Huber MD;  Location: [facility] OR;  Service: Vascular;;    DEBRIDEMENT OF FOOT Left 3/9/2021    Procedure: DEBRIDEMENT, FOOT, bone biopsy;  Surgeon: Rosio Mayes DPM;  Location: [facility] OR;  Service: Podiatry;  Laterality: Left;  Request neoxx---COVID IN AM  REQUESTING NOON START  RN Phone Pre op.On Blood thinners Plavix and Eliquis.  Covid am of surgery. C A    DEBRIDEMENT OF FOOT Left 5/4/2021    Procedure: DEBRIDEMENT, FOOT;  Surgeon: Farooq Morley DPM;  Location: [facility] OR 2ND FLR;  Service: Podiatry;  Laterality: Left;    INSERTION OF TUNNELED CENTRAL VENOUS HEMODIALYSIS CATHETER N/A 1/27/2020    Procedure: Insertion, Catheter, Central Venous, Hemodialysis;  Surgeon: ESTEBAN Gomez III, MD;  Location: [facility] CATH LAB;  Service: Peripheral Vascular;  Laterality: N/A;    INSERTION OF TUNNELED CENTRAL VENOUS HEMODIALYSIS CATHETER  5/10/2023    Procedure: Insertion, Catheter, Central Venous, Hemodialysis;  Surgeon: Romulo Queen MD;  Location: [facility] CATH LAB;  Service: Cardiology;;     PERCUTANEOUS TRANSLUMINAL ANGIOPLASTY N/A 3/4/2021    Procedure: PTA (ANGIOPLASTY, PERCUTANEOUS, TRANSLUMINAL);  Surgeon: Teddy Huber MD;  Location: [facility] OR;  Service: Vascular;  Laterality: N/A;    REMOVAL OF ARTERIOVENOUS GRAFT Left 5/27/2021    Procedure: REMOVAL, GRAFT, LEFT LOWER EXTREMITY, WOUND EXPLORATION;  Surgeon: Teddy Huber MD;  Location: [facility] OR 2ND FLR;  Service: Vascular;  Laterality: Left;    REMOVAL OF NAIL OF DIGIT Left 3/9/2021    Procedure: REMOVAL, NAIL, DIGIT;  Surgeon: Rosio Mayes DPM;  Location: [facility] OR;  Service: Podiatry;  Laterality: Left;    RIGHT HEART CATHETERIZATION Right 5/10/2023    Procedure: INSERTION, CATHETER, RIGHT HEART;  Surgeon: Romulo Queen MD;  Location: [facility] CATH LAB;  Service: Cardiology;  Laterality: Right;    THROMBECTOMY Left 3/4/2021    Procedure: THROMBECTOMY, LEFT LOWER EXTREMITY BYPASS GRAFT, ANGIOGRAM, POSSIBLE INTERVENTION, POSSIBLE LEFT LOWER EXTREMITY BYPASS;  Surgeon: Teddy Huber MD;  Location: [facility] OR;  Service: Vascular;  Laterality: Left;    THROMBECTOMY Left 4/29/2021    Procedure: GRAFT THROMBECTOMY, LEFT LOWER EXTREMITY;  Surgeon: Teddy Huber MD;  Location: [facility] OR [facility] FLR;  Service: Vascular;  Laterality: Left;  14.5 min  1179.85 mGy  341.01 Gycm2  240 ml dye    THROMBECTOMY  10/22/2021    Procedure: THROMBECTOMY;  Surgeon: Saad Arenas MD;  Location: [facility] CATH LAB/EP;  Service: Cardiology;;       Review of patient's allergies indicates:   Allergen Reactions    Ciprofloxacin Itching    Iodine      Kidney injury    Pcn [penicillins]      Rash; tolerated ceftriaxone on 1/13/20       Current Facility-Administered Medications on File Prior to Encounter   Medication    [DISCONTINUED] acetaminophen tablet 650 mg    [DISCONTINUED] apixaban tablet 5 mg    [DISCONTINUED] atorvastatin tablet 80 mg    [DISCONTINUED] carvediloL tablet 6.25 mg    [DISCONTINUED] ceFEPIme injection 1 g    [DISCONTINUED] ceFEPIme  injection 1 g    [DISCONTINUED] clopidogreL tablet 75 mg    [DISCONTINUED] dextrose 10% bolus 125 mL 125 mL    [DISCONTINUED] dextrose 10% bolus 250 mL 250 mL    [DISCONTINUED] furosemide tablet 40 mg    [DISCONTINUED] gabapentin capsule 100 mg    [DISCONTINUED] glucagon (human recombinant) injection 1 mg    [DISCONTINUED] glucose chewable tablet 16 g    [DISCONTINUED] glucose chewable tablet 24 g    [DISCONTINUED] heparin (porcine) injection 3,600 Units    [DISCONTINUED] insulin aspart U-100 pen 0-10 Units    [DISCONTINUED] insulin glargine U-100 (Lantus) pen 5 Units    [DISCONTINUED] melatonin tablet 6 mg    [DISCONTINUED] naloxone 0.4 mg/mL injection 0.02 mg    [DISCONTINUED] ondansetron injection 4 mg    [DISCONTINUED] oxyCODONE-acetaminophen 5-325 mg per tablet 1 tablet    [DISCONTINUED] potassium, sodium phosphates 280-160-250 mg packet 1 packet    [DISCONTINUED] senna-docusate 8.6-50 mg per tablet 1 tablet    [DISCONTINUED] sertraline tablet 100 mg    [DISCONTINUED] sodium chloride 0.9% flush 10 mL    [DISCONTINUED] trazodone split tablet 25 mg    [DISCONTINUED] vancomycin - pharmacy to dose    [DISCONTINUED] vancomycin 1,500 mg in D5W 250 mL IVPB (admixture device)     Current Outpatient Medications on File Prior to Encounter   Medication Sig    acetaminophen (TYLENOL) 500 MG tablet Take 2 tablets (1,000 mg total) by mouth every 8 (eight) hours as needed for Pain. (Patient taking differently: Take 650 mg by mouth every 8 (eight) hours as needed for Pain.)    allopurinoL (ZYLOPRIM) 100 MG tablet Take 0.5 tablets (50 mg total) by mouth every other day.    apixaban (ELIQUIS) 5 mg Tab Take 1 tablet (5 mg total) by mouth 2 (two) times daily.    atorvastatin (LIPITOR) 80 MG tablet Take 1 tablet (80 mg total) by mouth once daily.    blood sugar diagnostic Strp 1 each by Misc.(Non-Drug; Combo Route) route 3 (three) times daily.    blood sugar diagnostic Strp Use to check blood glucose 2 (two) times a day.     "blood-glucose meter Misc TEST TWICE DAILY    blood-glucose sensor (DEXCOM G7 SENSOR) Radha 1 each by Misc.(Non-Drug; Combo Route) route once daily.    calcitRIOL (ROCALTROL) 0.5 MCG Cap Take 1 capsule (0.5 mcg total) by mouth once daily.    calcium acetate,phosphat bind, (PHOSLO) 667 mg capsule Take 1 capsule (667 mg total) by mouth 3 (three) times daily with meals.    carvediloL (COREG) 6.25 MG tablet Take 1 tablet (6.25 mg total) by mouth 2 (two) times daily.    clopidogreL (PLAVIX) 75 mg tablet Take 1 tablet (75 mg total) by mouth once daily.    dextrose (GLUTOSE) 40 % gel Take 37.5 mLs (15,000 mg total) by mouth once. for 1 dose    epoetin nina (PROCRIT) 4,000 unit/mL injection Inject 1 mL (4,000 Units total) into the skin every Mon, Wed, Fri.    famotidine (PEPCID) 20 MG tablet Take 1 tablet (20 mg total) by mouth once daily. (Patient not taking: Reported on 10/15/2024)    furosemide (LASIX) 40 MG tablet Take 1 tablet (40 mg total) by mouth 2 (two) times a day.    hydrALAZINE (APRESOLINE) 50 MG tablet Take 1 tablet (50 mg total) by mouth 3 (three) times daily.    insulin aspart U-100 (NOVOLOG) 100 unit/mL (3 mL) InPn pen Inject 3 Units into the skin 3 (three) times daily with meals. (Patient not taking: Reported on 10/15/2024)    insulin glargine U-100, Lantus, (LANTUS U-100 INSULIN) 100 unit/mL injection Inject 8 Units into the skin every evening.    lancets 33 gauge Misc TEST 3 TIMES DAILY    multivitamin (ONE DAILY MULTIVITAMIN) per tablet Take 1 tablet by mouth once daily.    pen needle, diabetic 32 gauge x 5/32" Ndle 1 Units by Misc.(Non-Drug; Combo Route) route before meals as needed (SSI).    pen needle, diabetic 33 gauge x 5/32" Ndle 1 each by Misc.(Non-Drug; Combo Route) route 3 (three) times daily.    pregabalin (LYRICA) 50 MG capsule Take 1 capsule (50 mg total) by mouth 3 (three) times a week.    sertraline (ZOLOFT) 100 MG tablet Take 1 tablet (100 mg total) by mouth once daily.    sodium bicarbonate " 650 MG tablet Take 1 tablet (650 mg total) by mouth 3 (three) times daily.    traZODone (DESYREL) 50 MG tablet Take 0.5 tablets (25 mg total) by mouth every evening.    wound dressings (TRIAD WOUND DRESSING) Pste Apply 2 g topically once daily.    [DISCONTINUED] lancing device Misc 1 Device by Misc.(Non-Drug; Combo Route) route 2 (two) times daily with meals.     Family History       Problem Relation (Age of Onset)    Diabetes Mother, Father, Paternal Grandmother    Heart disease Maternal Grandmother    No Known Problems Maternal Grandfather, Paternal Grandfather          Tobacco Use    Smoking status: Former    Smokeless tobacco: Never   Substance and Sexual Activity    Alcohol use: No    Drug use: Yes     Types: Marijuana     Comment: occassional    Sexual activity: Yes     Partners: Male     Review of Systems   Constitutional:  Positive for activity change, appetite change and fever.   HENT: Negative.     Eyes: Negative.    Respiratory: Negative.     Cardiovascular: Negative.    Gastrointestinal: Negative.    Musculoskeletal: Negative.    Skin:  Positive for color change and rash.   Neurological: Negative.    Psychiatric/Behavioral: Negative.       Objective:     Vital Signs (Most Recent):  Temp: 99.6 °F (37.6 °C) (10/28/24 2040)  Pulse: 91 (10/28/24 2040)  Resp: 20 (10/28/24 2040)  BP: 102/62 (10/28/24 2228)  SpO2: (!) 91 % (10/28/24 2040) Vital Signs (24h Range):  Temp:  [97.6 °F (36.4 °C)-99.7 °F (37.6 °C)] 99.6 °F (37.6 °C)  Pulse:  [] 91  Resp:  [16-20] 20  SpO2:  [91 %-100 %] 91 %  BP: ()/(54-66) 102/62     Weight: 68.4 kg (150 lb 12.7 oz)  Body mass index is 46.02 kg/m².     Physical Exam  Constitutional:       Appearance: She is ill-appearing.   HENT:      Head: Normocephalic.      Nose: Nose normal.      Mouth/Throat:      Mouth: Mucous membranes are moist.   Eyes:      Extraocular Movements: Extraocular movements intact.      Conjunctiva/sclera: Conjunctivae normal.      Pupils: Pupils are  "equal, round, and reactive to light.   Cardiovascular:      Rate and Rhythm: Normal rate.   Pulmonary:      Effort: Pulmonary effort is normal.   Abdominal:      General: Abdomen is flat. Bowel sounds are normal.   Musculoskeletal:         General: Normal range of motion.      Cervical back: Normal range of motion.   Skin:     Capillary Refill: Capillary refill takes less than 2 seconds.      Coloration: Skin is pale.      Findings: Erythema, lesion and rash present.   Neurological:      General: No focal deficit present.      Mental Status: She is alert and oriented to person, place, and time.   Psychiatric:         Mood and Affect: Mood normal.              CRANIAL NERVES     CN III, IV, VI   Pupils are equal, round, and reactive to light.       Significant Labs: All pertinent labs within the past 24 hours have been reviewed.  Blood Culture: No results for input(s): "LABBLOO" in the last 48 hours.  BMP:   Recent Labs   Lab 10/28/24  0529   *   *   K 3.4*   CL 98   CO2 26   BUN 28*   CREATININE 4.0*   CALCIUM 8.0*   MG 1.8     CBC:   Recent Labs   Lab 10/27/24  0528 10/28/24  0529   WBC 10.48 11.20   HGB 10.2* 9.2*   HCT 30.7* 28.2*    257     CMP:   Recent Labs   Lab 10/27/24  0528 10/28/24  0529    135*   K 3.4* 3.4*    98   CO2 24 26   GLU 92 135*   BUN 15 28*   CREATININE 2.9* 4.0*   CALCIUM 8.3* 8.0*   PROT 8.1 7.8   ALBUMIN 2.2* 2.2*   BILITOT 0.6 0.6   ALKPHOS 421* 447*   AST 37 76*   ALT 20 23   ANIONGAP 12 11     Cardiac Markers: No results for input(s): "CKMB", "MYOGLOBIN", "BNP", "TROPISTAT" in the last 48 hours.  Coagulation: No results for input(s): "PT", "INR", "APTT" in the last 48 hours.  Lactic Acid: No results for input(s): "LACTATE" in the last 48 hours.  Lipase: No results for input(s): "LIPASE" in the last 48 hours.  Lipid Panel: No results for input(s): "CHOL", "HDL", "LDLCALC", "TRIG", "CHOLHDL" in the last 48 hours.  Magnesium:   Recent Labs   Lab " 10/27/24  0528 10/28/24  0529   MG 1.8 1.8       Significant Imaging: I have reviewed all pertinent imaging results/findings within the past 24 hours.  I have reviewed and interpreted all pertinent imaging results/findings within the past 24 hours.  Assessment/Plan:     * Cellulitis of left thigh    IP admit meeting benchmarks   Continue IV antibiotics   Follow up cultures. ID Consulted    Pressure injury of sacral region, unstageable  Wound care consult, off loading , PT OT       Morbid obesity with BMI of 50.0-59.9, adult  Body mass index is 46.02 kg/m². Morbid obesity complicates all aspects of disease management from diagnostic modalities to treatment. Weight loss encouraged and health benefits explained to patient.         Metabolic acidosis    Expect improvement with HD - ESRD     S/P AKA (above knee amputation) bilateral  Wound care consult, pain control    Peripheral vascular disease  Restart DAPT         VTE Risk Mitigation (From admission, onward)           Ordered     apixaban tablet 5 mg  2 times daily         10/28/24 2114     IP VTE HIGH RISK PATIENT  Once         10/28/24 2114     Place sequential compression device  Until discontinued         10/28/24 2114     Reason for No Pharmacological VTE Prophylaxis  Once        Question:  Reasons:  Answer:  Already adequately anticoagulated on oral Anticoagulants    10/28/24 2114     heparin (porcine) injection 3,600 Units  As needed (PRN)         10/28/24 2114                               Pharmacokinetic Initial Assessment: IV Vancomycin    Assessment/Plan:    Initiate intravenous vancomycin with loading dose of 1250 mg once with subsequent doses when random concentrations are less than 25 mcg/mL  Desired empiric serum trough concentration is 10 to 15 mcg/mL  Draw vancomycin random level on 10/29 at 0400.  Pharmacy will continue to follow and monitor vancomycin.      Please contact pharmacy at extension [phone] with any questions regarding this assessment.  "    Thank you for the consult,   Mc Mtz       Patient brief summary:  Suyapa Connelly is a 59 y.o. female initiated on antimicrobial therapy with IV Vancomycin for treatment of suspected skin & soft tissue infection    Drug Allergies:   Review of patient's allergies indicates:   Allergen Reactions    Ciprofloxacin Itching    Iodine      Kidney injury    Pcn [penicillins]      Rash; tolerated ceftriaxone on 1/13/20       Actual Body Weight:   68kg    Renal Function:   Estimated Creatinine Clearance: 11.3 mL/min (A) (based on SCr of 4 mg/dL (H)).,     Dialysis Method (if applicable):  intermittent HD    CBC (last 72 hours):  Recent Labs   Lab Result Units 10/26/24  0524 10/27/24  0528 10/28/24  0529   WBC K/uL 9.78 10.48 11.20   Hemoglobin g/dL 10.1* 10.2* 9.2*   Hematocrit % 30.8* 30.7* 28.2*   Platelets K/uL 265 251 257   Gran % % 81.1* 78.7* 79.1*   Lymph % % 5.3* 5.9* 6.0*   Mono % % 12.4 14.6 14.2   Eosinophil % % 1.0 0.5 0.4   Basophil % % 0.2 0.3 0.3   Differential Method  Automated Automated Automated       Metabolic Panel (last 72 hours):  Recent Labs   Lab Result Units 10/26/24  0524 10/27/24  0528 10/28/24  0529   Sodium mmol/L 135* 136 135*   Potassium mmol/L 3.2* 3.4* 3.4*   Chloride mmol/L 103 100 98   CO2 mmol/L 19* 24 26   Glucose mg/dL 98 92 135*   BUN mg/dL 22* 15 28*   Creatinine mg/dL 4.0* 2.9* 4.0*   Albumin g/dL 2.4* 2.2* 2.2*   Total Bilirubin mg/dL 0.5 0.6 0.6   Alkaline Phosphatase U/L 433* 421* 447*   AST U/L 38 37 76*   ALT U/L 21 20 23   Magnesium mg/dL 1.9 1.8 1.8   Phosphorus mg/dL 3.0 1.8* 2.3*       Drug levels (last 3 results):  No results for input(s): "VANCOMYCINRA", "VANCORANDOM", "VANCOMYCINPE", "VANCOPEAK", "VANCOMYCINTR", "VANCOTROUGH" in the last 72 hours.    Microbiologic Results:  Microbiology Results (last 7 days)       ** No results found for the last 168 hours. **              Anton Rosenbaum MD  Department of Hospital Medicine  [facility]          "

## 2024-10-29 NOTE — NURSING
MET called on patient due to difficulty arousing and a BP of 81/37 HR 88. Patient moved to ICU. Dr. Starkey made aware.

## 2024-10-29 NOTE — SUBJECTIVE & OBJECTIVE
Interval History: MET called this morning as patient was difficult to arouse and hypotensive. She was transferred to ICU.     Review of Systems   Unable to perform ROS: Acuity of condition     Objective:     Vital Signs (Most Recent):  Temp: 98.3 °F (36.8 °C) (10/29/24 1115)  Pulse: 77 (10/29/24 1145)  Resp: 15 (10/29/24 1145)  BP: (!) 92/58 (10/29/24 1133)  SpO2: 100 % (10/29/24 1145) Vital Signs (24h Range):  Temp:  [98.1 °F (36.7 °C)-101.6 °F (38.7 °C)] 98.3 °F (36.8 °C)  Pulse:  [] 77  Resp:  [11-21] 15  SpO2:  [85 %-100 %] 100 %  BP: ()/(37-77) 92/58     Weight: 68 kg (150 lb)  Body mass index is 45.77 kg/m².    Intake/Output Summary (Last 24 hours) at 10/29/2024 1320  Last data filed at 10/29/2024 0400  Gross per 24 hour   Intake 0 ml   Output 0 ml   Net 0 ml         Physical Exam  Vitals and nursing note reviewed.   Constitutional:       General: She is not in acute distress.     Appearance: She is well-developed. She is ill-appearing (chronically).      Interventions: Face mask in place.      Comments: On BIPAP  On HD   HENT:      Head: Normocephalic and atraumatic.   Eyes:      Conjunctiva/sclera: Conjunctivae normal.   Neck:      Vascular: No JVD.   Cardiovascular:      Rate and Rhythm: Normal rate and regular rhythm.      Heart sounds: Normal heart sounds.   Pulmonary:      Effort: Pulmonary effort is normal.      Breath sounds: Normal breath sounds.   Abdominal:      General: Bowel sounds are normal. There is no distension.      Palpations: Abdomen is soft.      Tenderness: There is no abdominal tenderness.   Musculoskeletal:      Cervical back: Neck supple.      Right lower le+ Edema present.      Left lower le+ Edema present.      Comments: B/L AKA   Neurological:      Mental Status: She is alert.   Psychiatric:         Behavior: Behavior normal.             Significant Labs: All pertinent labs within the past 24 hours have been reviewed.  CBC:   Recent Labs   Lab 10/28/24  6301  10/29/24  0505 10/29/24  0847 10/29/24  0856   WBC 11.20 8.78  --  9.11   HGB 9.2* 8.9*  --  9.1*   HCT 28.2* 29.0* 27.9* 29.3*    237  --  253     CMP:   Recent Labs   Lab 10/28/24  0529 10/29/24  0505 10/29/24  0856   * 131* 133*   K 3.4* 4.6 4.0   CL 98 95 97   CO2 26 20* 24   * 167* 115*   BUN 28* 43* 44*   CREATININE 4.0* 5.3* 5.5*   CALCIUM 8.0* 8.0* 8.3*   PROT 7.8 7.8 7.4   ALBUMIN 2.2* 2.1* 2.1*   BILITOT 0.6 0.8 0.8   ALKPHOS 447* 555* 530*   AST 76* 126* 137*   ALT 23 31 35   ANIONGAP 11 16 12       Significant Imaging: I have reviewed all pertinent imaging results/findings within the past 24 hours.

## 2024-10-29 NOTE — PROGRESS NOTES
Pharmacokinetic Assessment Follow Up: IV Vancomycin    Vancomycin serum concentration assessment(s):    The random level was drawn correctly and can be used to guide therapy at this time. The measurement is above the desired definitive target range of 10 to 15 mcg/mL.    Vancomycin Regimen Plan:    HD ordered for today - will give a one time dose of vancomycin 500 mg post HD today and recheck vacnomycin random prior to next HD session on 10/31.       Drug levels (last 3 results):  Recent Labs   Lab Result Units 10/29/24  0505   Vancomycin, Random ug/mL 18.1       Pharmacy will continue to follow and monitor vancomycin.    Please contact pharmacy at extension [phone] for questions regarding this assessment.    Thank you for the consult,   Earnestine Kirby       Patient brief summary:  Suyapa Connelly is a 59 y.o. female initiated on antimicrobial therapy with IV Vancomycin for treatment of skin & soft tissue infection    The patient's current regimen is pulse dose    Drug Allergies:   Review of patient's allergies indicates:   Allergen Reactions    Ciprofloxacin Itching    Iodine      Kidney injury    Pcn [penicillins]      Rash; tolerated ceftriaxone on 1/13/20       Actual Body Weight:   68.4 kg    Renal Function:   Estimated Creatinine Clearance: 11.3 mL/min (A) (based on SCr of 4 mg/dL (H)).,     Dialysis Method (if applicable):  intermittent HD    CBC (last 72 hours):  Recent Labs   Lab Result Units 10/27/24  0528 10/28/24  0529 10/29/24  0505   WBC K/uL 10.48 11.20 8.78   Hemoglobin g/dL 10.2* 9.2* 8.9*   Hematocrit % 30.7* 28.2* 29.0*   Platelets K/uL 251 257 237   Gran % % 78.7* 79.1* 73.5*   Lymph % % 5.9* 6.0* 6.5*   Mono % % 14.6 14.2 17.4*   Eosinophil % % 0.5 0.4 1.0   Basophil % % 0.3 0.3 0.5   Differential Method  Automated Automated Automated       Metabolic Panel (last 72 hours):  Recent Labs   Lab Result Units 10/27/24  0528 10/28/24  0529   Sodium mmol/L 136 135*   Potassium mmol/L 3.4* 3.4*    Chloride mmol/L 100 98   CO2 mmol/L 24 26   Glucose mg/dL 92 135*   BUN mg/dL 15 28*   Creatinine mg/dL 2.9* 4.0*   Albumin g/dL 2.2* 2.2*   Total Bilirubin mg/dL 0.6 0.6   Alkaline Phosphatase U/L 421* 447*   AST U/L 37 76*   ALT U/L 20 23   Magnesium mg/dL 1.8 1.8   Phosphorus mg/dL 1.8* 2.3*       Vancomycin Administrations:  vancomycin given in the last 96 hours                     vancomycin 1,250 mg in D5W 250 mL IVPB (admixture device) (mg) 1,250 mg New Bag 10/28/24 6793                    Microbiologic Results:  Microbiology Results (last 7 days)       ** No results found for the last 168 hours. **

## 2024-10-29 NOTE — PLAN OF CARE
VN note: Chart review completed. Patient labs and notes reviewed. Care plan and goals updated. VN available to intervene as needed.      Problem: Adult Inpatient Plan of Care  Goal: Plan of Care Review  Reactivated

## 2024-10-29 NOTE — ASSESSMENT & PLAN NOTE
Body mass index is 46.02 kg/m². Morbid obesity complicates all aspects of disease management from diagnostic modalities to treatment. Weight loss encouraged and health benefits explained to patient.

## 2024-10-29 NOTE — ASSESSMENT & PLAN NOTE
Patient has Combined Systolic and Diastolic heart failure that is Chronic. On presentation their CHF was well compensated. Most recent BNP and echo results are listed below.  Recent Labs     10/29/24  0853   BNP 1,478*     Latest ECHO  Results for orders placed during the hospital encounter of 05/07/24    Echo    Interpretation Summary    Left Ventricle: The left ventricle is mildly dilated. Ventricular mass is normal. Normal wall thickness. Severe global hypokinesis present. There is severely reduced systolic function. Ejection fraction by visual approximation is 15%. There is diastolic dysfunction.    Right Ventricle: Severe right ventricular enlargement. Wall thickness is normal. Right ventricle wall motion has global hypokinesis. Systolic function is mildly reduced.    Left Atrium: Left atrium is severely dilated. There is an atrial septal aneurysm.    Right Atrium: Right atrium is dilated.    Aortic Valve: There is mild aortic regurgitation.    Mitral Valve: There is mild regurgitation.    Tricuspid Valve: There is annular dilation present. There is normal leaflet mobility. There is severe functional regurgitation.    Pulmonary Artery: The estimated pulmonary artery systolic pressure is at least 24 mmHg. PASP is likely under-estimated in the setting of severe tricuspid regurgitation.    IVC/SVC: Central venous pressure of at least 8 mmHg.    No vegetation seen.    Current Heart Failure Medications       Plan  - Monitor strict I&Os and daily weights.    - Place on telemetry  - Low sodium diet  - Place on fluid restriction of 1 L.   - Cardiology has not been consulted  - The patient's volume status is stable but not at their baseline as indicated by edema- UF with HD as tolerated. Lasix DC'd 10/30 due to low BP

## 2024-10-29 NOTE — PROGRESS NOTES
[facility] Medicine  Progress Note    Patient Name: Suyapa Connelly  MRN: [id number]  Patient Class: IP- Inpatient   Admission Date: 10/28/2024  Length of Stay: 1 days  Attending Physician: Shae Starkey MD  Primary Care Provider: Vanessa Noel MD      Subjective:     Principal Problem:Cellulitis of left thigh      HPI:  Patient is a 59-year-old female with a known past medical history of hypertension, hyperlipidemia, diabetes mellitus type 2, ESRD on HD, and deconditioning that presents the ER after being on floor throughout the evening.     Patient presents after being found on the floor by a neighbor today.  Patient reports that she got out of bed to change her pad and diaper but was unable to get back into bed, staying on the floor throughout the evening.  Patient's  who normally cares for her, was arrested yesterday and is in [facility] and patient was unable to care for herself.  When patient was found by neighbor on floor, EMS was called and patient was brought to the ER for further evaluation.  On arrival to ER, patient in no acute distress.  Labs essentially within normal limits.  Patient last dialyzed on Thursday and no signs of fluid overload or electrolyte abnormalities.  Patient has no family NS able to assist her at home other than her  who is currently incarcerated.  Patient admitted for social problems and hemodialysis.    Patient due to her open wounds and CT finding of her left thigh - emphysema can not rule out infection -     Overview/Hospital Course:  No notes on file    Interval History: MET called this morning as patient was difficult to arouse and hypotensive. She was transferred to ICU.     Review of Systems   Unable to perform ROS: Acuity of condition     Objective:     Vital Signs (Most Recent):  Temp: 98.3 °F (36.8 °C) (10/29/24 1115)  Pulse: 77 (10/29/24 1145)  Resp: 15 (10/29/24 1145)  BP: (!) 92/58 (10/29/24 1133)  SpO2: 100 % (10/29/24 1145)  Vital Signs (24h Range):  Temp:  [98.1 °F (36.7 °C)-101.6 °F (38.7 °C)] 98.3 °F (36.8 °C)  Pulse:  [] 77  Resp:  [11-21] 15  SpO2:  [85 %-100 %] 100 %  BP: ()/(37-77) 92/58     Weight: 68 kg (150 lb)  Body mass index is 45.77 kg/m².    Intake/Output Summary (Last 24 hours) at 10/29/2024 1320  Last data filed at 10/29/2024 0400  Gross per 24 hour   Intake 0 ml   Output 0 ml   Net 0 ml         Physical Exam  Vitals and nursing note reviewed.   Constitutional:       General: She is not in acute distress.     Appearance: She is well-developed. She is ill-appearing (chronically).      Interventions: Face mask in place.      Comments: On BIPAP  On HD   HENT:      Head: Normocephalic and atraumatic.   Eyes:      Conjunctiva/sclera: Conjunctivae normal.   Neck:      Vascular: No JVD.   Cardiovascular:      Rate and Rhythm: Normal rate and regular rhythm.      Heart sounds: Normal heart sounds.   Pulmonary:      Effort: Pulmonary effort is normal.      Breath sounds: Normal breath sounds.   Abdominal:      General: Bowel sounds are normal. There is no distension.      Palpations: Abdomen is soft.      Tenderness: There is no abdominal tenderness.   Musculoskeletal:      Cervical back: Neck supple.      Right lower le+ Edema present.      Left lower le+ Edema present.      Comments: B/L AKA   Neurological:      Mental Status: She is alert.   Psychiatric:         Behavior: Behavior normal.             Significant Labs: All pertinent labs within the past 24 hours have been reviewed.  CBC:   Recent Labs   Lab 10/28/24  0529 10/29/24  0505 10/29/24  0847 10/29/24  0856   WBC 11.20 8.78  --  9.11   HGB 9.2* 8.9*  --  9.1*   HCT 28.2* 29.0* 27.9* 29.3*    237  --  253     CMP:   Recent Labs   Lab 10/28/24  0529 10/29/24  0505 10/29/24  0856   * 131* 133*   K 3.4* 4.6 4.0   CL 98 95 97   CO2 26 20* 24   * 167* 115*   BUN 28* 43* 44*   CREATININE 4.0* 5.3* 5.5*   CALCIUM 8.0* 8.0* 8.3*   PROT  7.8 7.8 7.4   ALBUMIN 2.2* 2.1* 2.1*   BILITOT 0.6 0.8 0.8   ALKPHOS 447* 555* 530*   AST 76* 126* 137*   ALT 23 31 35   ANIONGAP 11 16 12       Significant Imaging: I have reviewed all pertinent imaging results/findings within the past 24 hours.    Assessment/Plan:      * Cellulitis of left thigh  L AKA- necrotic amputation surgical site- developing eschar 9x15x0.2cm and blisters to periwound  Continue IV antibiotics   General surgery consulted for wound debridement. Recs: continue recommendations per Wound Care   - Aquacel AG daily to L AKA    Pressure injury of sacral region, unstageable  Wound care    - Pressure injury prevention interventions   - Triad ointment BID to sacrum/buttocks      Morbid obesity with BMI of 50.0-59.9, adult  Body mass index is 45.77 kg/m². Morbid obesity complicates all aspects of disease management from diagnostic modalities to treatment.           S/P AKA (above knee amputation) bilateral  Fall precautions     Septic shock  This patient has shock. The type of shock is distributive due to sepsis. The patient has the following evidence of shock: altered mental status. The patient will be admitted to an intensive care unit, they will be treated with IV abx.    Patient did not require IVF resuscitation or pressors. BP recovered.     Chronic combined systolic and diastolic congestive heart failure  Patient has Combined Systolic and Diastolic heart failure that is Chronic. On presentation their CHF was well compensated. Most recent BNP and echo results are listed below.  Recent Labs     10/29/24  0853   BNP 1,478*     Latest ECHO  Results for orders placed during the hospital encounter of 05/07/24    Echo    Interpretation Summary    Left Ventricle: The left ventricle is mildly dilated. Ventricular mass is normal. Normal wall thickness. Severe global hypokinesis present. There is severely reduced systolic function. Ejection fraction by visual approximation is 15%. There is diastolic  dysfunction.    Right Ventricle: Severe right ventricular enlargement. Wall thickness is normal. Right ventricle wall motion has global hypokinesis. Systolic function is mildly reduced.    Left Atrium: Left atrium is severely dilated. There is an atrial septal aneurysm.    Right Atrium: Right atrium is dilated.    Aortic Valve: There is mild aortic regurgitation.    Mitral Valve: There is mild regurgitation.    Tricuspid Valve: There is annular dilation present. There is normal leaflet mobility. There is severe functional regurgitation.    Pulmonary Artery: The estimated pulmonary artery systolic pressure is at least 24 mmHg. PASP is likely under-estimated in the setting of severe tricuspid regurgitation.    IVC/SVC: Central venous pressure of at least 8 mmHg.    No vegetation seen.    Current Heart Failure Medications  furosemide tablet 40 mg, 2 times daily, Oral  NORepinephrine 4 mg in dextrose 5% 250 mL infusion (premix), Continuous, Intravenous    Plan  - Monitor strict I&Os and daily weights.    - Place on telemetry  - Low sodium diet  - Place on fluid restriction of 1 L.   - Cardiology has not been consulted  - The patient's volume status is stable but not at their baseline as indicated by edema        Peripheral vascular disease  On DAPT         VTE Risk Mitigation (From admission, onward)           Ordered     apixaban tablet 5 mg  2 times daily         10/28/24 2114     IP VTE HIGH RISK PATIENT  Once         10/28/24 2114     Place sequential compression device  Until discontinued         10/28/24 2114     Reason for No Pharmacological VTE Prophylaxis  Once        Question:  Reasons:  Answer:  Already adequately anticoagulated on oral Anticoagulants    10/28/24 2114     heparin (porcine) injection 3,600 Units  As needed (PRN)         10/28/24 2114                    Discharge Planning   DEVAN:      Code Status: Full Code   Is the patient medically ready for discharge?:     Reason for patient still in hospital  (select all that apply): Patient trending condition, Treatment, and Consult recommendations             Critical care time spent on the evaluation and treatment of severe organ dysfunction, review of pertinent labs and imaging studies, discussions with consulting providers and discussions with patient/family: 47 minutes.      Shae Starkey MD  Department of Hospital Medicine   [place] - Intensive Care

## 2024-10-29 NOTE — NURSING
Patient transferred to ICU Rm 555 s/p MET.  Transported via bed with Critical Care team at bedside.  Accepting RN, Cecilia, at bedside upon arrival.  Patient connected to central monitoring.  RTs, Critical Care MD team at bedside.

## 2024-10-29 NOTE — ASSESSMENT & PLAN NOTE
Suyapa Connelly is a 58yo F with b/l AKAs and wound breakdown at L AKA. General Surgery consulted for concern for nec fasc.     - Low suspicion for nec fasc.. Patient with appropriate pain in area surrounding wound. Area of minimal subcutaneous emphysema on CT scan located at area of wound breakdown suspect is due to breakdown/opening in that area. No noted organized fluid collection consistent with abscess. No purulent drainage or fluctuance.   - WBC wnl. Lactate wnl.   - Mental status improved without surgical intervention  - Continue recommendations per Wound Care  - Abx per primary team

## 2024-10-29 NOTE — CONSULTS
[facility]ious Diseases Consult Note    Primary Attending Physician: Shae Starkey MD     Reason for Consult:     Left thigh open wound with cellulitis and subcutaneous emphysema    Assessment (see problem list below):     Suyapa Connelly is a 59 y.o. female with:  Left thigh pain with open wound and concern for cellulitis and subcutaneous emphysema.  Subcutaneous emphysema is likely related to the open nature of the wound.  Foul smell from Wound indicates Gram-negative and anaerobes.  Bilateral AKAs.  Related to vascular insufficiency   Severe arterial vascular disease.  Patient is status post CABG as well as stents that have clotted in her lower extremities  Diabetes, ESRD      Plan:     Continue vancomycin  Start piperacillin tazobactam  Will give one dose of metronidazole.  Low threshold for surgical debridement and cultures to be sent  Monitor patient progress with antibiotic therapy    Thank you for allowing us to participate in the care of this patient. Please contact me if you have any questions regarding this consult.    Juarez ZAMBRANO Cooper  [facility]  Pager: [phone]    Subjective:      History of Present Illness:  Suyapa Connelly is a 59 y.o. female with diabetes, peripheral artery disease status post stent placement, ESRD, CAD status post CABG, bilateral AKAs presents with left thigh cellulitis after being found on the floor by a neighbor    Patient has severe peripheral vascular disease requiring bilateral AKAs.    09/30/2024 patient was seen in the emergency department for left leg pain.  Patient was transferred to [place] for further evaluation.  Patient was noted to have mild tenderness in the left thigh with scar tissue over distal stump incision.  No fluctuance was appreciated.  CTA showed occlusion of bypass stent.  Patient was discharged with conservative therapy.    10/15/2024 patient is seen in clinic complaining of increasing leg pain.  Patient was treated symptomatically.    According  to the patient, the patient fell out of bed and hurt her left thigh.  Since that time, the left thigh has been getting worse.    10/25/2024 patient is seen in the emergency department for leg pain.  Patient also had issue with caregiver being available.  Patient was admitted to [facility].  10/28/2024 patient was transferred to [facility] because of worsening left thigh pain and black gas are noted in the left thigh.  Patient was empirically started on vancomycin and cefepime.  CT of the thigh showed some subcutaneous fat stranding and some subcutaneous emphysema in the medial aspect of the open wound.    10/29/2024 patient had a temperature to 101.6.  WBC 9.1.  Blood cultures were obtained.  She was subsequently changed to vancomycin and piperacillin tazobactam.  Patient has pen allergy on chart.  Surgery consulted.  Infectious disease was consulted for open thigh wound with cellulitis and subcutaneous emphysema.    Patient had several positive urine cultures in the past most recently for Candida.  No resistant bacteria isolated in our system.    Past Medical History:  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    Osteomyelitis     Osteomyelitis of left foot 04/30/2021    Ulcer of left foot     Vaginal delivery     x1       Past Surgical History:  Past Surgical History:   Procedure Laterality Date    ABOVE-KNEE AMPUTATION Left 5/18/2021    Procedure: AMPUTATION, ABOVE KNEE;  Surgeon: Teddy Huber MD;  Location: [facility] OR [address] [place] [address];  Service: Vascular;  Laterality: Left;    ABOVE-KNEE AMPUTATION Right 3/18/2022    Procedure: AMPUTATION, ABOVE KNEE;  Surgeon: DAYNE Florez II, MD;  Location: [facility] OR [address] [place] [address];  Service: Vascular;   Laterality: Right;    Angiogram - Right Extremity Right 7/9/15    angiogram-left leg  10/6/15    ANGIOGRAPHY OF LOWER EXTREMITY Left 4/29/2021    Procedure: ANGIOGRAM, LOWER EXTREMITY;  Surgeon: Teddy Huber MD;  Location: [address];  Service: Vascular;  Laterality: Left;    BELOW KNEE AMPUTATION OF LOWER EXTREMITY Right 12/28/2021    Procedure: AMPUTATION, BELOW KNEE;  Surgeon: Kaitlyn Rojas MD;  Location: [facility];  Service: General;  Laterality: Right;    CATHETERIZATION OF BOTH LEFT AND RIGHT HEART N/A 12/18/2019    Procedure: CATHETERIZATION, HEART, BOTH LEFT AND RIGHT;  Surgeon: Que Fernando III, MD;  Location: [facility] CATH LAB;  Service: Cardiology;  Laterality: N/A;    CORONARY ANGIOGRAPHY N/A 12/18/2019    Procedure: ANGIOGRAM, CORONARY ARTERY;  Surgeon: Que Fernando III, MD;  Location: [facility] CATH LAB;  Service: Cardiology;  Laterality: N/A;    CORONARY ANGIOGRAPHY INCLUDING BYPASS GRAFTS WITH CATHETERIZATION OF LEFT HEART N/A 7/28/2020    Procedure: ANGIOGRAM, CORONARY, INCLUDING BYPASS GRAFT, WITH LEFT HEART CATHETERIZATION, 9 am;  Surgeon: Rachel Easley MD;  Location: [facility] CATH LAB;  Service: Cardiology;  Laterality: N/A;    CORONARY ARTERY BYPASS GRAFT (CABG) N/A 1/14/2020    Procedure: CORONARY ARTERY BYPASS GRAFT (CABG) x 1     Off Pump;  Surgeon: Huang Altamirano MD;  Location: [address];  Service: Cardiovascular;  Laterality: N/A;    CREATION OF FEMORAL-TIBIAL ARTERY BYPASS Left 4/29/2021    Procedure: CREATION, BYPASS, ARTERIAL, FEMORAL TO ANTERIOR TIBIAL;  Surgeon: Teddy Huber MD;  Location: [address];  Service: Vascular;  Laterality: Left;    CREATION OF FEMOROPOPLITEAL ARTERIAL BYPASS USING GRAFT Left 8/18/2020    Procedure: CREATION, BYPASS, ARTERIAL, FEMORAL TO POPLITEAL, USING GRAFT, LEFT LOWER EXTREMITY;  Surgeon: Teddy Huber MD;  Location: [facility];  Service: Vascular;  Laterality: Left;  REQUEST 7:15 A.M. START----COVID NEGATIVE  ON 8/17  1ST CASE STARTKENYA DUEÑAS ON 8/7/2020 @ 942AM-LO  RN PREOP 8/12/2020   T/S-----CLEARED BY CARDS-------PENDING INSURANCE    DEBRIDEMENT OF FOOT Left 9/8/2020    Procedure: DEBRIDEMENT, FOOT;  Surgeon: Rosio Mayes DPM;  Location: [facility] OR;  Service: Podiatry;  Laterality: Left;  request neoxx .   RN Pre Op 9-4-2020, Covid negative on 9/5/20. C A    DEBRIDEMENT OF FOOT  3/4/2021    Procedure: DEBRIDEMENT, FOOT;  Surgeon: Teddy Huber MD;  Location: [facility] OR;  Service: Vascular;;    DEBRIDEMENT OF FOOT Left 3/9/2021    Procedure: DEBRIDEMENT, FOOT, bone biopsy;  Surgeon: Rosio Mayes DPM;  Location: [facility] OR;  Service: Podiatry;  Laterality: Left;  Request neoxx---COVID IN AM  REQUESTING NOON START  RN Phone Pre op.On Blood thinners Plavix and Eliquis.  Covid am of surgery. C A    DEBRIDEMENT OF FOOT Left 5/4/2021    Procedure: DEBRIDEMENT, FOOT;  Surgeon: Farooq Morley DPM;  Location: [facility] OR 2ND FLR;  Service: Podiatry;  Laterality: Left;    INSERTION OF TUNNELED CENTRAL VENOUS HEMODIALYSIS CATHETER N/A 1/27/2020    Procedure: Insertion, Catheter, Central Venous, Hemodialysis;  Surgeon: ESTEBAN Gomez III, MD;  Location: [facility] CATH LAB;  Service: Peripheral Vascular;  Laterality: N/A;    INSERTION OF TUNNELED CENTRAL VENOUS HEMODIALYSIS CATHETER  5/10/2023    Procedure: Insertion, Catheter, Central Venous, Hemodialysis;  Surgeon: Romulo Queen MD;  Location: [facility] CATH LAB;  Service: Cardiology;;    PERCUTANEOUS TRANSLUMINAL ANGIOPLASTY N/A 3/4/2021    Procedure: PTA (ANGIOPLASTY, PERCUTANEOUS, TRANSLUMINAL);  Surgeon: Teddy Huber MD;  Location: [facility] OR;  Service: Vascular;  Laterality: N/A;    REMOVAL OF ARTERIOVENOUS GRAFT Left 5/27/2021    Procedure: REMOVAL, GRAFT, LEFT LOWER EXTREMITY, WOUND EXPLORATION;  Surgeon: Teddy Huber MD;  Location: [facility] OR 2ND FLR;  Service: Vascular;  Laterality: Left;    REMOVAL OF NAIL OF DIGIT Left 3/9/2021    Procedure: REMOVAL,  NAIL, DIGIT;  Surgeon: Rosio Mayes DPM;  Location: [facility] OR;  Service: Podiatry;  Laterality: Left;    RIGHT HEART CATHETERIZATION Right 5/10/2023    Procedure: INSERTION, CATHETER, RIGHT HEART;  Surgeon: Romulo Queen MD;  Location: [facility] CATH LAB;  Service: Cardiology;  Laterality: Right;    THROMBECTOMY Left 3/4/2021    Procedure: THROMBECTOMY, LEFT LOWER EXTREMITY BYPASS GRAFT, ANGIOGRAM, POSSIBLE INTERVENTION, POSSIBLE LEFT LOWER EXTREMITY BYPASS;  Surgeon: Teddy Huber MD;  Location: [facility] OR;  Service: Vascular;  Laterality: Left;    THROMBECTOMY Left 2021    Procedure: GRAFT THROMBECTOMY, LEFT LOWER EXTREMITY;  Surgeon: Teddy Huber MD;  Location: [facility] OR [facility] FLR;  Service: Vascular;  Laterality: Left;  14.5 min  1179.85 mGy  341.01 Gycm2  240 ml dye    THROMBECTOMY  10/22/2021    Procedure: THROMBECTOMY;  Surgeon: Saad Arenas MD;  Location: [facility] CATH LAB/EP;  Service: Cardiology;;       Allergies:  Review of patient's allergies indicates:   Allergen Reactions    Ciprofloxacin Itching    Iodine      Kidney injury    Pcn [penicillins]      Rash; tolerated ceftriaxone on 20       Medications:  Reviewed  Antibiotics  Vancomycin  Cefepime  Family History:  Family History   Problem Relation Name Age of Onset    Diabetes Mother      Diabetes Father      Heart disease Maternal Grandmother      No Known Problems Maternal Grandfather      Diabetes Paternal Grandmother      No Known Problems Paternal Grandfather      Anesthesia problems Neg Hx         Social History:  Social History     Tobacco Use    Smoking status: Former    Smokeless tobacco: Never   Substance Use Topics    Alcohol use: No    Drug use: Yes     Types: Marijuana     Comment: occassional     Review of Systems   Musculoskeletal:         Bilateral thigh pain left greater than right.   All other systems reviewed and are negative.      Objective:   Last 24 Hour Vital Signs:  BP  Min: 81/37  Max: 150/77  Temp  Av.5  °F (37.5 °C)  Min: 98.1 °F (36.7 °C)  Max: 101.6 °F (38.7 °C)  Pulse  Av  Min: 77  Max: 109  Resp  Avg: 15.6  Min: 10  Max: 21  SpO2  Av.7 %  Min: 85 %  Max: 100 %  Height  Av' (121.9 cm)  Min: 4' (121.9 cm)  Max: 4' (121.9 cm)  Weight  Av.3 kg (150 lb 8.5 oz)  Min: 68 kg (150 lb)  Max: 68.4 kg (150 lb 12.7 oz)  No intake/output data recorded.    Physical Exam  Vitals and nursing note reviewed.   HENT:      Head: Normocephalic and atraumatic.      Nose: Nose normal. No congestion.      Mouth/Throat:      Mouth: Mucous membranes are moist.      Pharynx: Oropharynx is clear. No oropharyngeal exudate.   Eyes:      Conjunctiva/sclera: Conjunctivae normal.      Pupils: Pupils are equal, round, and reactive to light.   Neck:      Comments: Left tunneled dialysis catheter  Cardiovascular:      Rate and Rhythm: Tachycardia present.      Heart sounds: No murmur heard.     No friction rub. No gallop.      Comments: Distant heart sounds.  Upper and lower extremity pulses decreased peripherally  Pulmonary:      Comments: Decreased breath sounds in the bases  Abdominal:      General: Bowel sounds are normal. There is no distension.      Palpations: Abdomen is soft.      Tenderness: There is no abdominal tenderness. There is no guarding.   Genitourinary:     Comments: No Kerns  Musculoskeletal:      Cervical back: Neck supple.      Comments: Upper extremities with cool hands and contracted fingers bilaterally.  Right thigh soft but tender to palpation.  Left thigh with induration medially and foul-smelling wound.  Unable to feel for crepitus because of tenderness.   Lymphadenopathy:      Cervical: No cervical adenopathy.   Skin:     Findings: Erythema and lesion present.   Neurological:      General: No focal deficit present.      Mental Status: She is alert.      Comments: Patient in pain.  Not able to really interact         Devices:  Peripheral IV  Dialysis catheter    Laboratory Results: reviewed  Most  Recent Data:  CBC:   Lab Results   Component Value Date    WBC 9.11 10/29/2024    HGB 9.1 (L) 10/29/2024    HCT 29.3 (L) 10/29/2024     10/29/2024     (H) 10/29/2024    RDW 18.9 (H) 10/29/2024     BMP:   Lab Results   Component Value Date     (L) 10/29/2024    K 4.0 10/29/2024    CL 97 10/29/2024    CO2 24 10/29/2024    BUN 44 (H) 10/29/2024     (H) 10/29/2024    CALCIUM 8.3 (L) 10/29/2024    MG 1.8 10/29/2024    PHOS 2.5 (L) 10/29/2024     LFTs:   Lab Results   Component Value Date    PROT 7.4 10/29/2024    ALBUMIN 2.1 (L) 10/29/2024    BILITOT 0.8 10/29/2024     (H) 10/29/2024    ALKPHOS 530 (H) 10/29/2024    ALT 35 10/29/2024     (H) 10/29/2024     Urinalysis:   Lab Results   Component Value Date    LABURIN (A) 06/26/2024     ELIZABETH KRUSEI  50,000 - 99,999 cfu/ml  Treatment of asymptomatic candiduria is not recommended (except for   specific populations). Candida isolated in the urine typically   represents colonization. If an indwelling urinary catheter is present  it should be removed or replaced.      COLORU [place] (A) 06/26/2024    COLORU [place] (A) 06/26/2024    SPECGRAV 1.015 06/26/2024    SPECGRAV 1.015 06/26/2024    NITRITE Negative 06/26/2024    NITRITE Negative 06/26/2024    KETONESU Negative 06/26/2024    KETONESU Negative 06/26/2024    UROBILINOGEN Negative 08/13/2022       Trended Lab Data:  Recent Labs   Lab 10/28/24  0529 10/29/24  0505 10/29/24  0847 10/29/24  0856   WBC 11.20 8.78  --  9.11   HGB 9.2* 8.9*  --  9.1*   HCT 28.2* 29.0* 27.9* 29.3*    237  --  253   * 106*  --  107*   RDW 18.1* 18.7*  --  18.9*   * 131*  --  133*   K 3.4* 4.6  --  4.0   CL 98 95  --  97   CO2 26 20*  --  24   BUN 28* 43*  --  44*   * 167*  --  115*   PROT 7.8 7.8  --  7.4   ALBUMIN 2.2* 2.1*  --  2.1*   BILITOT 0.6 0.8  --  0.8   AST 76* 126*  --  137*   ALKPHOS 447* 555*  --  530*   ALT 23 31  --  35       Microbiology Data:  Blood cultures in  process    Other Results:    Radiology:  Reviewed  Chest x-ray 10/29/2024 with low lung volumes  CT thigh 10/28/2024 with subcutaneous emphysema and subcutaneous fat stranding  CTA lower extremity 09/30/2024 occluded stent with severe vascular disease    Problem List  Patient Active Problem List   Diagnosis    Peripheral vascular disease    Acute encephalopathy    Hypocalcemia    Vitamin D deficiency    CAROLIN (generalized anxiety disorder)    Acute cystitis without hematuria    Type 2 diabetes mellitus with hyperglycemia, with long-term current use of insulin    CAD (coronary artery disease)    S/P CABG x 1    Anemia due to chronic kidney disease, on chronic dialysis    Paroxysmal atrial fibrillation    Hyponatremia    S/P femoral-popliteal bypass surgery    Thrombosis of femoro-popliteal bypass graft    Impaired functional mobility and endurance    Nephrotic syndrome    Encephalopathy    Uremia    Vascular graft infection    Hypoalbuminemia    Chronic combined systolic and diastolic congestive heart failure    Postmenopausal bleeding    Uterine fibroid    Muscle wasting and atrophy, not elsewhere classified, right thigh     Adjustment disorder with mixed anxiety and depressed mood    Phantom limb syndrome with pain    Peripheral neuropathic pain    Impaired eye movement    Septic shock    Myoclonus    Dermatitis associated with moisture    Uses self-applied continuous glucose monitoring device    S/P AKA (above knee amputation) bilateral    Hyperkalemia    Debility    ESRD (end stage renal disease)    Myalgia    Chronic kidney disease-mineral and bone disorder    Uremic encephalopathy    Acute metabolic encephalopathy    Morbid obesity with BMI of 50.0-59.9, adult    Hypoglycemia    Depression    Unresponsive    Thrombocytopenia    Severe obesity (BMI >= 40)    Irritant contact dermatitis due to friction or contact with body fluids, unspecified    Pressure injury of sacral region, unstageable    Confusion     Discharge planning issues    Postconcussive syndrome    Screening for cervical cancer    Need for vaccination    Mastodynia    Need for Tdap vaccination    Type 2 diabetes mellitus with chronic kidney disease on chronic dialysis, with long-term current use of insulin    Atherosclerosis of native coronary artery of native heart with angina pectoris    Breast asymmetry    Psychophysiological insomnia    Social problem    Open thigh wound, left, initial encounter    Cellulitis of left thigh     See above for impression and recommendations.

## 2024-10-29 NOTE — PT/OT/SLP PROGRESS
Occupational Therapy      Patient Name:  Suyapa Connelly   MRN:  [id number]    Attempts made 10:30 AM and 1:30 PM Patient not seen today secondary to  (MET called in AM, HD in PM). Will follow-up as able.    10/29/2024

## 2024-10-29 NOTE — PLAN OF CARE
CM met with pt at bedside to discuss discharge planning. Pt is independent with some ADL's. However, she fell at home while trying to do some of her ADL's. She lives at home with spouse who is currently incarcerated. Has no other caregiver. Goes to dialysis 3 x a week. Has Tyrell AKA and will need SNF after medically ready for discharge. Referrals sent via care port. LOCET completed. She has a wheelchair, no HH services. Upon dc plan is to go to a SNF and possible [facility] placement if no care giver available. Pt made aware to contact CM with any questions or concerns. CM will continue to follow and assist with any discharge needs.  Future Appointments   Date Time Provider Department Center   11/6/2024 11:30 AM Teddy Huber MD [facility]   11/15/2024  1:00 PM Vanessa Noel MD [facility]   11/22/2024  2:30 PM Georgina Iyer PA-C [facility] - Intensive Care  Initial Discharge Assessment       Primary Care Provider: Vanessa Noel MD    Admission Diagnosis: Leg wound, left [S81.802A]  Wound infection [T14.8XXA, L08.9]    Admission Date: 10/28/2024  Expected Discharge Date:     Transition of Care Barriers: (P) No family/friends to help    Payor: HU[employer] MANAGED MEDICARE / Plan: HU[employer] MEDICARE SELECT PARTNER / Product Type: Medicare Advantage /     Extended Emergency Contact Information  Primary Emergency Contact: Tea Connelly  Mobile Phone: [phone]  Relation: Mother  Secondary Emergency Contact: Randell Connelly  Address: [address] [place] [address] of [facility]  Mobile Phone: [phone]  Relation: Spouse    Discharge Plan A: (P) Skilled Nursing Facility  Discharge Plan B: (P) New Nursing Home placement - longterm care facility      [id number] - [place], [place] - [address] AT [facility] [address]  [address]  [facility] [address]  Phone: [phone] Fax:  [phone]      Initial Assessment (most recent)       Adult Discharge Assessment - 10/29/24 1349          Discharge Assessment    Assessment Type Discharge Planning Assessment     Confirmed/corrected address, phone number and insurance Yes     Confirmed Demographics Correct on Facesheet     Source of Information patient     When was your last doctors appointment? 10/15/24     Communicated DEVAN with patient/caregiver Date not available/Unable to determine     Reason For Admission Cellulitis of L thigh     People in Home spouse     Facility Arrived From: [facility]     Do you expect to return to your current living situation? No     Do you have help at home or someone to help you manage your care at home? No     Prior to hospitilization cognitive status: Unable to Assess     Current cognitive status: Alert/Oriented     Walking or Climbing Stairs Difficulty yes     Walking or Climbing Stairs transferring difficulty, dependent     Mobility Management Tyrell AKA     Dressing/Bathing Difficulty yes     Dressing/Bathing bathing difficulty, assistance 1 person     Home Accessibility wheelchair accessible     Equipment Currently Used at Home wheelchair     Readmission within 30 days? Yes     Patient currently being followed by outpatient case management? No     Do you currently have service(s) that help you manage your care at home? No     Do you take prescription medications? Yes     Do you have prescription coverage? Yes     Coverage [employer]     Do you have any problems affording any of your prescribed medications? No     Is the patient taking medications as prescribed? yes     Who is going to help you get home at discharge? Hospital transport     How do you get to doctors appointments? family or friend will provide     Are you on dialysis? Yes     Dialysis Name and Scheduled days [facility] in [place] Tues/Thur/Sat     Do you take coumadin? No (P)    Plavix, Eliquis    Discharge Plan A Skilled Nursing  Facility (P)      Discharge Plan B New Nursing Home placement - assisted care facility (P)      DME Needed Upon Discharge  none (P)      Discharge Plan discussed with: Patient (P)      Transition of Care Barriers No family/friends to help (P)         Physical Activity    On average, how many days per week do you engage in moderate to strenuous exercise (like a brisk walk)? 0 days (P)      On average, how many minutes do you engage in exercise at this level? 0 min (P)         Financial Resource Strain    How hard is it for you to pay for the very basics like food, housing, medical care, and heating? Somewhat hard (P)         Housing Stability    In the last 12 months, was there a time when you were not able to pay the mortgage or rent on time? No (P)      At any time in the past 12 months, were you homeless or living in a shelter (including now)? No (P)         Transportation Needs    Has the lack of transportation kept you from medical appointments, meetings, work or from getting things needed for daily living? No (P)         Food Insecurity    Within the past 12 months, you worried that your food would run out before you got the money to buy more. Never true (P)      Within the past 12 months, the food you bought just didn't last and you didn't have money to get more. Never true (P)         Stress    Do you feel stress - tense, restless, nervous, or anxious, or unable to sleep at night because your mind is troubled all the time - these days? To some extent (P)         Social Isolation    How often do you feel lonely or isolated from those around you?  Rarely (P)         Alcohol Use    Q1: How often do you have a drink containing alcohol? Never (P)      Q2: How many drinks containing alcohol do you have on a typical day when you are drinking? Patient does not drink (P)      Q3: How often do you have six or more drinks on one occasion? Never (P)         Utilities    In the past 12 months has the electric, gas, oil,  or water company threatened to shut off services in your home? No (P)         Health Literacy    How often do you need to have someone help you when you read instructions, pamphlets, or other written material from your doctor or pharmacy? Rarely (P)         OTHER    Name(s) of People in Home Randell Connelly (P)

## 2024-10-29 NOTE — PLAN OF CARE
Problem: Adult Inpatient Plan of Care  Goal: Plan of Care Review  Outcome: Progressing  Goal: Optimal Comfort and Wellbeing  Outcome: Progressing     Problem: Sepsis/Septic Shock  Goal: Optimal Coping  Outcome: Progressing     Problem: Infection  Goal: Absence of Infection Signs and Symptoms  Outcome: Not Progressing     Problem: Wound  Goal: Absence of Infection Signs and Symptoms  Outcome: Not Progressing   -pt aaox3  -weaned off bipap  -Hd completed - 2.5L removed  -Echo completed  -u/s (abdominal) completed  -Chest xray completed   -Blood cultures

## 2024-10-29 NOTE — CONSULTS
[facility] Consult Note    Primary Attending Physician: Dr. Starkey  Consultant Attending: Dr. Crawford  Consultant Fellow: Dr. Martel    Reason for Consult:     Acute Encephalopathy and Hypoxia    Subjective:      History of Present Illness:  Ms. Suyapa Connelly is a 59 year old female with a PMHx of ESRD on HD, T2DM, HTN, and HLD who presents to [facility] on 10/25 after being found down on floor by neighbor. Her caretaker () was arrested the day prior. Patient transferred to [place] due to concerns of need for revascularization after found to have occlusion of proximal left common iliac artery. On 10/29, MET was called as patient was unresponsive. Last known normal was stated to be the night prior. Patient was found to be hypoxic to 84%. Given the unknown cause for her altered mental status, she was placed on Bipap and brought up to the ICU for further workup. Patient is due for HD dialysis.    Review of Systems:  All other systems are reviewed and are negative.     Objective:   Last 24 Hour Vital Signs:  BP  Min: 81/37  Max: 150/77  Temp  Av.5 °F (37.5 °C)  Min: 98.1 °F (36.7 °C)  Max: 101.6 °F (38.7 °C)  Pulse  Av.6  Min: 77  Max: 109  Resp  Avg: 15.8  Min: 10  Max: 21  SpO2  Av.6 %  Min: 85 %  Max: 100 %  Height  Av' (121.9 cm)  Min: 4' (121.9 cm)  Max: 4' (121.9 cm)  Weight  Av.3 kg (150 lb 8.5 oz)  Min: 68 kg (150 lb)  Max: 68.4 kg (150 lb 12.7 oz)  No intake/output data recorded.    Physical Examination:  General: not responsive, snoring  Cardiovascular: regular rate and rhythm  Respiratory: on Bipap FiO2 50%, lungs CTAB  Abdomen: soft, non-distended  Extremities: bilateral AKA, edema noted in thighs  Skin: large wound to medial thigh (image in media), sacral pressure wound  Neuro: non-responsive to stimuli, moving both upper extremities    Laboratory:  Trended Lab Data:  Recent Labs     10/28/24  0529 10/29/24  0505 10/29/24  5598  10/29/24  0856 10/29/24  1030 10/29/24  1031   WBC 11.20 8.78  --  9.11  --   --    HGB 9.2* 8.9*  --  9.1*  --   --    HCT 28.2* 29.0* 27.9* 29.3*  --   --     237  --  253  --   --    * 131*  --  133*  --   --    K 3.4* 4.6  --  4.0  --   --    CL 98 95  --  97  --   --    CO2 26 20*  --  24  --   --    BUN 28* 43*  --  44*  --   --    CREATININE 4.0* 5.3*  --  5.5*  --   --    * 167*  --  115*  --   --    BILITOT 0.6 0.8  --  0.8  --   --    AST 76* 126*  --  137*  --   --    ALT 23 31  --  35  --   --    ALKPHOS 447* 555*  --  530*  --   --    CALCIUM 8.0* 8.0*  --  8.3*  --   --    ALBUMIN 2.2* 2.1*  --  2.1*  --   --    PROT 7.8 7.8  --  7.4  --   --    MG 1.8 2.0  --   --   --  1.8   PHOS 2.3* 3.3  --   --   --  2.5*   INR  --   --   --   --  1.4*  --        Cardiac:   Recent Labs   Lab 10/29/24  0853 10/29/24  1031   TROPONINI  --  0.033*   BNP 1,478*  --        Urinalysis:   Lab Results   Component Value Date    LABURIN (A) 06/26/2024     ELIZABETH KRUSEI  50,000 - 99,999 cfu/ml  Treatment of asymptomatic candiduria is not recommended (except for   specific populations). Candida isolated in the urine typically   represents colonization. If an indwelling urinary catheter is present  it should be removed or replaced.      COLORU [place] (A) 06/26/2024    COLORU [place] (A) 06/26/2024    SPECGRAV 1.015 06/26/2024    SPECGRAV 1.015 06/26/2024    NITRITE Negative 06/26/2024    NITRITE Negative 06/26/2024    KETONESU Negative 06/26/2024    KETONESU Negative 06/26/2024    UROBILINOGEN Negative 08/13/2022         Radiology:  I have personally reviewed the above labs and imaging.     Assessment:     Ms. Suyapa Connelly is a 59 year old female with a PMHx of ESRD on HD, T2DM, HTN, and HLD who presents to [facility] on 10/25 after being found down on floor by neighbor. On 10/29, MET was called as patient was unresponsive and hypoxic. Last known normal was stated to be the night prior. The  cause of her AMS is unclear at this time, but a few factors (listed below) could participate in the cause. Also concern for infection, most notably from left thigh cellulitis, although other sources are being investigated.      Plan:     Neurology  Acute Encephalopathy  - MET called due to AMS, unknown etiology.  Last known normal last night.   - no significant electrolyte abnormalities   - no significant hypercarbia   - TSH and glucose wnl   - low concern for seizure     - hx of ESRD, getting dialyzed today   - ammonia level slightly elevated to 65   - was taking cefepime, trazadone, and gabapentin. Discontinued these medications.  - concern for infectious etiology to AMS in setting of left thigh wound. Can consider meningitis if worsening fever     Cardiovascular  Hypotension  - initial concern for shock, but BP continues to improve  - Levo ordered but never administered. MAP goal >65    Heart Failure with Reduced Ejection Fraction  - previous ECHO May 2024 with EF 15% and severe global hypokinesis  - BNP 1478, trops 0.033  - getting dialysis today in setting of ESRD, appears hypervolemic on exam  - ordered repeat ECHO    Atrial Fibrillation  - currently rate and rhythm controlled  - AC with Eliquis 5mg BID  - holding carvedilol in the setting of hypotension    CAD  PVD  - CTA of LLE shows extensive plaque within the common, internal, and external iliac arteries and occlusion of the proximal left common iliac artery   - s/p bilateral AKA  - continue plavix and lipitor    Respiratory  Acute Hypoxic Respiratory Failure  - arrived to MET with spO2 at 84%  - ABG pH 7.38, pCO2 47, pO2 70, bicarb 28  - placed on Bipap, can most likely wean off to NC    Gastrointestinal  Elevated ALP, AST, and GGT  Elevated Ammonia  - lipase and T bili wnl  - ordered RUQ ultrasound as unclear etiology to elevated labs  - elevated ammonia not likely high enough to be cause of AMS  - Hep B surface Ag reactive, but confirmatory test  negative    Renal   ESRD  - on TTS dialysis, plan for dialysis today  - HD catheter in left subclavian  - patient can make small amount of urine    Hematology  Macrocytic Anemia  - at baseline    Endocrine  Hyperparathyroidism  - likely secondary to CKD    Type II Diabetes  - A1C 5.9%  - at goal glucose on SSI    TSH 3.3    Infectious Disease  Concern for Sepsis  Cellulitis of Left Thigh  - febrile without leukocytosis   - left thigh wound present; also with pressure sacral wound (pictures in media)  - CT thigh with small amount of subcutaneous emphysema   - surgery consulted for concern of nec fasc  - CXR and UA ordered for other sources of sepsis  - started on empiric vanc and zosyn  - no fluids in setting of hypervolemic status  - blood cultures taken  - wound care following     ICU Checklist  Feeding: renal diet  Analgesia: PRN, avoid sedating meds in setting of AMS  Sedation: none  Thrombo PPX: Eliquis 5mg BID  Head of Bed: >30 degrees  Ulcer (Stress) PPX: not indicated  Glucose: at goal 140-180  SBT/SAT: N/A  Bowel Regimen: miralax and senna  Indwelling catheter/Lines/Invasive devices: left hemodialysis catheter, PIV  Abx: start vanc and zosyn    CODE STATUS: FULL  DISPO: admit to ICU for management of AMS and sepsis      Magen Dear, DO  [employer] [facility], PGY-1

## 2024-10-29 NOTE — ASSESSMENT & PLAN NOTE
Wound care    - Pressure injury prevention interventions   - Triad ointment BID to sacrum/buttocks

## 2024-10-29 NOTE — CONSULTS
[facility] - Intensive Care  Wound Care    Patient Name:  Suyapa Connelly   MRN:  [id number]  Date: 10/29/2024  Diagnosis: Cellulitis of left thigh    History:     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    Osteomyelitis     Osteomyelitis of left foot 04/30/2021    Ulcer of left foot     Vaginal delivery     x1       Social History     Socioeconomic History    Marital status:    Occupational History    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      Social Drivers of Health     Financial Resource Strain: Patient Unable To Answer (10/28/2024)    Overall Financial Resource Strain (CARDIA)     Difficulty of Paying Living Expenses: Patient unable to answer   Food Insecurity: Patient Unable To Answer (10/28/2024)    Hunger Vital Sign     Worried About Running Out of Food in the Last Year: Patient unable to answer     Ran Out of Food in the Last Year: Patient unable to answer   Transportation Needs: Patient Unable To Answer (10/28/2024)    TRANSPORTATION NEEDS     Transportation : Patient unable to answer   Physical Activity: Inactive (10/28/2024)    Exercise Vital Sign     Days of Exercise per Week: 0 days     Minutes of Exercise per Session: 0 min   Stress: Patient Unable To Answer (10/28/2024)    [employer] - Occupational Stress Questionnaire     Feeling of Stress : Patient unable to answer   Housing Stability: Patient Unable To Answer (10/28/2024)     Housing Stability Vital Sign     Unable to Pay for Housing in the Last Year: Patient unable to answer     Homeless in the Last Year: Patient unable to answer       Precautions:     Allergies as of 10/28/2024 - Reviewed 10/28/2024   Allergen Reaction Noted    Ciprofloxacin Itching 07/18/2020    Iodine  01/10/2020    Pcn [penicillins]  11/14/2014       [facility] Assessment Details/Treatment   MD team diagnosed EDGAR EVANGELISTA with cellulitis with concern for nec fasc- general surgery assessed wound and reports low suspicion for nec fasc.  Of note, pt did sit on floor at home for many hours as she was not able to get back in bed without help- unable to determine at this time if the wounds listed below are a result from pressure.   L AKA- full thickness wound with developing eschar 9x15x0.2cm and blisters to periwound- dark drainage with odor      L buttock/coccyx- healing partial thickness scattered with darkened discolored skin over sacrum- pt was down on floor at home for unknown amount of time- unable to determine if this is a developing DTPI at this time      Recommendations discussed with pt, nurse and Dr. Starkey:  - Pressure injury prevention interventions   - Triad ointment BID to sacrum/buttocks  - Aquacel AG daily to L AKA  - Surgery consult for debridement and possibly begin vac therapy.      10/29/2024

## 2024-10-29 NOTE — ASSESSMENT & PLAN NOTE
Body mass index is 45.77 kg/m². Morbid obesity complicates all aspects of disease management from diagnostic modalities to treatment.

## 2024-10-29 NOTE — HPI
Suyapa Connelly is known to our service. She is a 58yo F with pmhx of hypertension, hyperlipidemia, diabetes mellitus type 2, ESRD on HD, and PAD (s/p B/l AKA) who presented to the ED after being found on the floor by her neighbor. Patient reports that she got out of bed to change her pad and diaper but was unable to get back into bed and stayed on the floor throughout the evening. Per nursing, patient was altered and minimally responsive on admission to ICU but has become more alert since starting HD/ being put on bipap. Patient was noted to have some wound breakdown at the medial aspect of her L AKA incision and CT was obtained that showed subcutaneous fatty stranding of the left thigh and distal medial thigh subcutaneous emphysema. WBC and lactate wnl. General Surgery was consulted for further evaluation d/t concerns for nec fasc. On exam patient is more alert and answering questions appropriately. Wound breakdown at medial aspect of L AKA incision with some surrounding induration. No significant erythema, fluctuance or drainage noted. Patient does have pain on palpation of that area. Patient was determined not to have nec fasc based on exam and labs. No indications for wound debridement and recommended continuing with current wound care regimen. She had been progressing well and was stepped down to the floor. Overnight on 11/3/24 she was noted to have significant bleeding from L stump with saturation of bedding. Surgicel was applied by primary team and CBC drawn that showed a hgb of 6.6 from 9.4. Blood products were infused and she was stepped up to the ICU for further monitoring. General Surgery was consulted for evaluation. Hgb came up to 8.2 with transfusion of 1u pRBC. This am she has reportedly continued to saturate her dressing even with pressure applied and had to be started on levo to maintain her pressures.

## 2024-10-29 NOTE — PT/OT/SLP PROGRESS
Physical Therapy  Visit Attempt    Patient Name:  Suyapa Connelly   MRN:  [id number]    Patient not seen today secondary to Other (Comment). Pt with MET in AM, in HD, and then ultrasound upon 3 attempts. Will follow-up tomorrow's date.    10/29/2024

## 2024-10-29 NOTE — CONSULTS
NEPHROLOGY CONSULT NOTE    HPI & INTERVAL HISTORY:    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    Osteomyelitis     Osteomyelitis of left foot 04/30/2021    Ulcer of left foot     Vaginal delivery     x1      Past Surgical History:   Procedure Laterality Date    ABOVE-KNEE AMPUTATION Left 5/18/2021    Procedure: AMPUTATION, ABOVE KNEE;  Surgeon: Teddy Huber MD;  Location: [facility] OR [address] [place] [address];  Service: Vascular;  Laterality: Left;    ABOVE-KNEE AMPUTATION Right 3/18/2022    Procedure: AMPUTATION, ABOVE KNEE;  Surgeon: DAYNE Florez II, MD;  Location: [facility] OR [address] [place] [address];  Service: Vascular;  Laterality: Right;    Angiogram - Right Extremity Right 7/9/15    angiogram-left leg  10/6/15    ANGIOGRAPHY OF LOWER EXTREMITY Left 4/29/2021    Procedure: ANGIOGRAM, LOWER EXTREMITY;  Surgeon: Teddy Huber MD;  Location: [facility] OR [address] [place] [address];  Service: Vascular;  Laterality: Left;    BELOW KNEE AMPUTATION OF LOWER EXTREMITY Right 12/28/2021    Procedure: AMPUTATION, BELOW KNEE;  Surgeon: Kaitlyn Rojas MD;  Location: [facility];  Service: General;  Laterality: Right;    CATHETERIZATION OF BOTH LEFT AND RIGHT HEART N/A 12/18/2019    Procedure: CATHETERIZATION, HEART, BOTH LEFT AND RIGHT;  Surgeon: Que Fernando III, MD;  Location: [facility] CATH LAB;  Service: Cardiology;  Laterality: N/A;    CORONARY ANGIOGRAPHY N/A 12/18/2019    Procedure: ANGIOGRAM, CORONARY ARTERY;  Surgeon: Que Fernando III, MD;  Location: [facility] CATH LAB;  Service: Cardiology;  Laterality: N/A;    CORONARY ANGIOGRAPHY INCLUDING BYPASS GRAFTS WITH CATHETERIZATION OF LEFT HEART N/A 7/28/2020    Procedure: ANGIOGRAM, CORONARY, INCLUDING BYPASS GRAFT, WITH LEFT HEART  CATHETERIZATION, 9 am;  Surgeon: Rachel Easley MD;  Location: [facility] CATH LAB;  Service: Cardiology;  Laterality: N/A;    CORONARY ARTERY BYPASS GRAFT (CABG) N/A 1/14/2020    Procedure: CORONARY ARTERY BYPASS GRAFT (CABG) x 1     Off Pump;  Surgeon: Huang Altamirano MD;  Location: [facility] OR [address] [place] [address];  Service: Cardiovascular;  Laterality: N/A;    CREATION OF FEMORAL-TIBIAL ARTERY BYPASS Left 4/29/2021    Procedure: CREATION, BYPASS, ARTERIAL, FEMORAL TO ANTERIOR TIBIAL;  Surgeon: Teddy Huber MD;  Location: [facility] OR [address] [place] [address];  Service: Vascular;  Laterality: Left;    CREATION OF FEMOROPOPLITEAL ARTERIAL BYPASS USING GRAFT Left 8/18/2020    Procedure: CREATION, BYPASS, ARTERIAL, FEMORAL TO POPLITEAL, USING GRAFT, LEFT LOWER EXTREMITY;  Surgeon: Teddy Huber MD;  Location: [facility];  Service: Vascular;  Laterality: Left;  REQUEST 7:15 A.M. START----COVID NEGATIVE ON 8/17  1ST CASE STARTKENYA PER LEANA ON 8/7/2020 @ 942AM-  RN PREOP 8/12/2020   T/S-----CLEARED BY CARDS-------PENDING INSURANCE    DEBRIDEMENT OF FOOT Left 9/8/2020    Procedure: DEBRIDEMENT, FOOT;  Surgeon: Rosio Mayes DPM;  Location: [facility];  Service: Podiatry;  Laterality: Left;  request neoxx .   RN Pre Op 9-4-2020, Covid negative on 9/5/20. C A    DEBRIDEMENT OF FOOT  3/4/2021    Procedure: DEBRIDEMENT, FOOT;  Surgeon: Teddy Huber MD;  Location: [facility] OR;  Service: Vascular;;    DEBRIDEMENT OF FOOT Left 3/9/2021    Procedure: DEBRIDEMENT, FOOT, bone biopsy;  Surgeon: Rosio Mayes DPM;  Location: [facility] OR;  Service: Podiatry;  Laterality: Left;  Request neoxx---COVID IN AM  REQUESTING NOON START  RN Phone Pre op.On Blood thinners Plavix and Eliquis.  Covid am of surgery. C A    DEBRIDEMENT OF FOOT Left 5/4/2021    Procedure: DEBRIDEMENT, FOOT;  Surgeon: Farooq Morley DPM;  Location: [facility] OR 2ND FLR;  Service: Podiatry;  Laterality: Left;    INSERTION OF TUNNELED CENTRAL VENOUS HEMODIALYSIS CATHETER N/A 1/27/2020     Procedure: Insertion, Catheter, Central Venous, Hemodialysis;  Surgeon: ESTEBAN Gomez III, MD;  Location: [facility] CATH LAB;  Service: Peripheral Vascular;  Laterality: N/A;    INSERTION OF TUNNELED CENTRAL VENOUS HEMODIALYSIS CATHETER  5/10/2023    Procedure: Insertion, Catheter, Central Venous, Hemodialysis;  Surgeon: Romulo Queen MD;  Location: [facility] CATH LAB;  Service: Cardiology;;    PERCUTANEOUS TRANSLUMINAL ANGIOPLASTY N/A 3/4/2021    Procedure: PTA (ANGIOPLASTY, PERCUTANEOUS, TRANSLUMINAL);  Surgeon: Teddy Huber MD;  Location: [facility] OR;  Service: Vascular;  Laterality: N/A;    REMOVAL OF ARTERIOVENOUS GRAFT Left 5/27/2021    Procedure: REMOVAL, GRAFT, LEFT LOWER EXTREMITY, WOUND EXPLORATION;  Surgeon: Teddy Huber MD;  Location: [facility] 2ND FLR;  Service: Vascular;  Laterality: Left;    REMOVAL OF NAIL OF DIGIT Left 3/9/2021    Procedure: REMOVAL, NAIL, DIGIT;  Surgeon: Rosio Mayes DPM;  Location: [facility] OR;  Service: Podiatry;  Laterality: Left;    RIGHT HEART CATHETERIZATION Right 5/10/2023    Procedure: INSERTION, CATHETER, RIGHT HEART;  Surgeon: Romulo Queen MD;  Location: [facility] CATH LAB;  Service: Cardiology;  Laterality: Right;    THROMBECTOMY Left 3/4/2021    Procedure: THROMBECTOMY, LEFT LOWER EXTREMITY BYPASS GRAFT, ANGIOGRAM, POSSIBLE INTERVENTION, POSSIBLE LEFT LOWER EXTREMITY BYPASS;  Surgeon: Teddy Huber MD;  Location: [facility] OR;  Service: Vascular;  Laterality: Left;    THROMBECTOMY Left 4/29/2021    Procedure: GRAFT THROMBECTOMY, LEFT LOWER EXTREMITY;  Surgeon: Teddy Huber MD;  Location: [facility] OR 2ND FLR;  Service: Vascular;  Laterality: Left;  14.5 min  1179.85 mGy  341.01 Gycm2  240 ml dye    THROMBECTOMY  10/22/2021    Procedure: THROMBECTOMY;  Surgeon: Saad Arenas MD;  Location: [facility] CATH LAB/EP;  Service: Cardiology;;      Review of patient's allergies indicates:   Allergen Reactions    Ciprofloxacin Itching    Iodine      Kidney injury    Pcn  [penicillins]      Rash; tolerated ceftriaxone on 1/13/20      Medications Prior to Admission   Medication Sig Dispense Refill Last Dose/Taking    acetaminophen (TYLENOL) 500 MG tablet Take 2 tablets (1,000 mg total) by mouth every 8 (eight) hours as needed for Pain. (Patient taking differently: Take 650 mg by mouth every 8 (eight) hours as needed for Pain.) 90 tablet 0     allopurinoL (ZYLOPRIM) 100 MG tablet Take 0.5 tablets (50 mg total) by mouth every other day. 8 tablet 11     apixaban (ELIQUIS) 5 mg Tab Take 1 tablet (5 mg total) by mouth 2 (two) times daily. 60 tablet 11     atorvastatin (LIPITOR) 80 MG tablet Take 1 tablet (80 mg total) by mouth once daily. 30 tablet 11     blood sugar diagnostic Strp 1 each by Misc.(Non-Drug; Combo Route) route 3 (three) times daily. 200 each 0     blood sugar diagnostic Strp Use to check blood glucose 2 (two) times a day. 100 strip 0     blood-glucose meter Misc TEST TWICE DAILY 1 each 0     blood-glucose sensor (DEXCOM G7 SENSOR) Radha 1 each by Misc.(Non-Drug; Combo Route) route once daily. 5 each 0     calcitRIOL (ROCALTROL) 0.5 MCG Cap Take 1 capsule (0.5 mcg total) by mouth once daily. 30 capsule 11     calcium acetate,phosphat bind, (PHOSLO) 667 mg capsule Take 1 capsule (667 mg total) by mouth 3 (three) times daily with meals. 90 capsule 11     carvediloL (COREG) 6.25 MG tablet Take 1 tablet (6.25 mg total) by mouth 2 (two) times daily. 60 tablet 11     clopidogreL (PLAVIX) 75 mg tablet Take 1 tablet (75 mg total) by mouth once daily. 30 tablet 11     dextrose (GLUTOSE) 40 % gel Take 37.5 mLs (15,000 mg total) by mouth once. for 1 dose 37.5 g 3     epoetin nina (PROCRIT) 4,000 unit/mL injection Inject 1 mL (4,000 Units total) into the skin every Mon, Wed, Fri.       famotidine (PEPCID) 20 MG tablet Take 1 tablet (20 mg total) by mouth once daily. (Patient not taking: Reported on 10/15/2024) 30 tablet 11     furosemide (LASIX) 40 MG tablet Take 1 tablet (40 mg total)  "by mouth 2 (two) times a day. 180 tablet 0     hydrALAZINE (APRESOLINE) 50 MG tablet Take 1 tablet (50 mg total) by mouth 3 (three) times daily. 90 tablet 11     insulin aspart U-100 (NOVOLOG) 100 unit/mL (3 mL) InPn pen Inject 3 Units into the skin 3 (three) times daily with meals. (Patient not taking: Reported on 10/15/2024) 3 mL 10     insulin glargine U-100, Lantus, (LANTUS U-100 INSULIN) 100 unit/mL injection Inject 8 Units into the skin every evening.       lancets 33 gauge Misc TEST 3 TIMES DAILY 100 each 3     multivitamin (ONE DAILY MULTIVITAMIN) per tablet Take 1 tablet by mouth once daily.       pen needle, diabetic 32 gauge x 5/32" Ndle 1 Units by Misc.(Non-Drug; Combo Route) route before meals as needed (SSI). 100 each 3     pen needle, diabetic 33 gauge x 5/32" Ndle 1 each by Misc.(Non-Drug; Combo Route) route 3 (three) times daily. 100 each 0     pregabalin (LYRICA) 50 MG capsule Take 1 capsule (50 mg total) by mouth 3 (three) times a week. 36 capsule 0     sertraline (ZOLOFT) 100 MG tablet Take 1 tablet (100 mg total) by mouth once daily. 30 tablet 11     sodium bicarbonate 650 MG tablet Take 1 tablet (650 mg total) by mouth 3 (three) times daily. 90 tablet 11     traZODone (DESYREL) 50 MG tablet Take 0.5 tablets (25 mg total) by mouth every evening. 15 tablet 11     wound dressings (TRIAD WOUND DRESSING) Pste Apply 2 g topically once daily. 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      Social Drivers of Health     Financial Resource Strain: Patient Unable To Answer (10/28/2024)    Overall Financial Resource Strain (CARDIA)     " Difficulty of Paying Living Expenses: Patient unable to answer   Food Insecurity: Patient Unable To Answer (10/28/2024)    Hunger Vital Sign     Worried About Running Out of Food in the Last Year: Patient unable to answer     Ran Out of Food in the Last Year: Patient unable to answer   Transportation Needs: Patient Unable To Answer (10/28/2024)    TRANSPORTATION NEEDS     Transportation : Patient unable to answer   Physical Activity: Inactive (10/28/2024)    Exercise Vital Sign     Days of Exercise per Week: 0 days     Minutes of Exercise per Session: 0 min   Stress: Patient Unable To Answer (10/28/2024)    [employer] - Occupational Stress Questionnaire     Feeling of Stress : Patient unable to answer   Housing Stability: Patient Unable To Answer (10/28/2024)    Housing Stability Vital Sign     Unable to Pay for Housing in the Last Year: Patient unable to answer     Homeless in the Last Year: Patient unable to answer        MEDS   0.9% NaCl   Intravenous Once    apixaban  5 mg Oral BID    atorvastatin  80 mg Oral Daily    carvediloL  6.25 mg Oral BID    clopidogreL  75 mg Oral Daily    furosemide  40 mg Oral BID    insulin glargine U-100  5 Units Subcutaneous QHS    mupirocin   Nasal BID    piperacillin-tazobactam (Zosyn) IV (PEDS and ADULTS) (extended infusion is not appropriate)  4.5 g Intravenous Q12H    potassium, sodium phosphates  1 packet Oral QID (WM & HS)    sertraline  100 mg Oral Daily    vancomycin (VANCOCIN) IV (PEDS and ADULTS)  500 mg Intravenous Once             CONTINOUS INFUSIONS:      Intake/Output Summary (Last 24 hours) at 10/29/2024 1051  Last data filed at 10/29/2024 0400  Gross per 24 hour   Intake 0 ml   Output 0 ml   Net 0 ml        HEMODYNAMICS:    Temp:  [98.1 °F (36.7 °C)-101.6 °F (38.7 °C)] 101.6 °F (38.7 °C)  Pulse:  [] 81  Resp:  [14-20] 19  SpO2:  [85 %-100 %] 100 %  BP: ()/(37-66) 110/55   General: NAD  Cardiology : Pulse 79  Pulmonary :RR  11  Pulse oximeter 100 % O2  Abdomen soft   Extremities : edema, wounds   Skin: dry   LABS   Lab Results   Component Value Date    WBC 9.11 10/29/2024    HGB 9.1 (L) 10/29/2024    HCT 29.3 (L) 10/29/2024     (H) 10/29/2024     10/29/2024        Recent Labs   Lab 10/29/24  0856   *   CALCIUM 8.3*   ALBUMIN 2.1*   PROT 7.4   *   K 4.0   CO2 24   CL 97   BUN 44*   CREATININE 5.5*   ALKPHOS 530*   ALT 35   *   BILITOT 0.8      Lab Results   Component Value Date    .8 (H) 10/28/2024    CALCIUM 8.3 (L) 10/29/2024    CAION 0.92 (L) 05/17/2023    PHOS 3.3 10/29/2024      Lab Results   Component Value Date    IRON 94 06/22/2024    TIBC 265 06/22/2024    FERRITIN 757 (H) 06/22/2024        ABG  Recent Labs   Lab 10/29/24  0847   PH 7.380   PO2 69.6*   PCO2 47.2*   HCO3 27.9         IMAGING:  CXR    ASSESSMENT / PLAN  ESRD  Left chest tunneled catheter  Hyponatremia  Metabolic bone disease  Hypophosphatemia  Anemia multifactorial  Hb 9.1  Poor nutrition  Albumin 2.1  BNP 1478  Troponin 0.033  Hypotensive   /59  Wounds   Cultures   Antibiotics  Wound care  Weight daily  I and O  Renal diet as tolerated  Dialysis

## 2024-10-29 NOTE — PROGRESS NOTES
The sw met with the pt to introduce herself. The pt states she is agreeable to go to [facility] or one in the area near her hd center. The pt goes to [facility] at 10am under the care of Dr. Luisana Calvillo. The pt's dtr Cornelia Connelly [phone] lives in [place]. The pt's mother Tea Connelly [phone] lives in [place]. The pt states if she can't get into [facility],she would be amendable to go to a snf near her mother's home. The pt acknowledged understanding she will need snf after d/c due to her primary caregiver which is her spouse Randell Connelly [phone] being incarcerated(for speeding tickets). The sw encouraged the the pt to call if she has any further questions or concerns. The sw completed the white board in the pt's room with her name and contact info. Cleo notified this Sw of the pt's needs.

## 2024-10-29 NOTE — CONSULTS
[facility] - Intensive Care  General Surgery  Consult Note    Patient Name: Suyapa Connelly  MRN: [id number]  Code Status: Full Code  Admission Date: 10/28/2024  Hospital Length of Stay: 1 days  Attending Physician: Shae Starkey MD  Primary Care Provider: Vanessa Noel MD    Patient information was obtained from past medical records and primary team.     Inpatient consult to General Surgery  Consult performed by: Itzel Kuo MD  Consult ordered by: Magen Canales MD        Subjective:     Principal Problem: Cellulitis of left thigh    History of Present Illness: Suyapa Connelly is a 58yo F with pmhx of hypertension, hyperlipidemia, diabetes mellitus type 2, ESRD on HD, and PAD (s/p B/l AKA) who presented to the ED after being found on the floor by her neighbor. Patient reports that she got out of bed to change her pad and diaper but was unable to get back into bed and stayed on the floor throughout the evening. Per nursing, patient was altered and minimally responsive on admission to ICU but has become more alert since starting HD/ being put on bipap. Patient was noted to have some wound breakdown at the medial aspect of her L AKA incision and CT was obtained that showed subcutaneous fatty stranding of the left thigh and distal medial thigh subcutaneous emphysema. WBC and lactate wnl. General Surgery was consulted for further evaluation d/t concerns for nec fasc. On exam patient is more alert and answering questions appropriately. Wound breakdown at medial aspect of L AKA incision with some surrounding induration. No significant erythema, fluctuance or drainage noted. Patient does have pain on palpation of that area.     Current Facility-Administered Medications on File Prior to Encounter   Medication    [DISCONTINUED] acetaminophen tablet 650 mg    [DISCONTINUED] apixaban tablet 5 mg    [DISCONTINUED] atorvastatin tablet 80 mg    [DISCONTINUED] carvediloL tablet 6.25 mg    [DISCONTINUED] ceFEPIme  injection 1 g    [DISCONTINUED] ceFEPIme injection 1 g    [DISCONTINUED] clopidogreL tablet 75 mg    [DISCONTINUED] dextrose 10% bolus 125 mL 125 mL    [DISCONTINUED] dextrose 10% bolus 250 mL 250 mL    [DISCONTINUED] furosemide tablet 40 mg    [DISCONTINUED] gabapentin capsule 100 mg    [DISCONTINUED] glucagon (human recombinant) injection 1 mg    [DISCONTINUED] glucose chewable tablet 16 g    [DISCONTINUED] glucose chewable tablet 24 g    [DISCONTINUED] heparin (porcine) injection 3,600 Units    [DISCONTINUED] insulin aspart U-100 pen 0-10 Units    [DISCONTINUED] insulin glargine U-100 (Lantus) pen 5 Units    [DISCONTINUED] melatonin tablet 6 mg    [DISCONTINUED] naloxone 0.4 mg/mL injection 0.02 mg    [DISCONTINUED] ondansetron injection 4 mg    [DISCONTINUED] oxyCODONE-acetaminophen 5-325 mg per tablet 1 tablet    [DISCONTINUED] potassium, sodium phosphates 280-160-250 mg packet 1 packet    [DISCONTINUED] senna-docusate 8.6-50 mg per tablet 1 tablet    [DISCONTINUED] sertraline tablet 100 mg    [DISCONTINUED] sodium chloride 0.9% flush 10 mL    [DISCONTINUED] trazodone split tablet 25 mg    [DISCONTINUED] vancomycin - pharmacy to dose    [DISCONTINUED] vancomycin 1,500 mg in D5W 250 mL IVPB (admixture device)     Current Outpatient Medications on File Prior to Encounter   Medication Sig    acetaminophen (TYLENOL) 500 MG tablet Take 2 tablets (1,000 mg total) by mouth every 8 (eight) hours as needed for Pain. (Patient taking differently: Take 650 mg by mouth every 8 (eight) hours as needed for Pain.)    allopurinoL (ZYLOPRIM) 100 MG tablet Take 0.5 tablets (50 mg total) by mouth every other day.    apixaban (ELIQUIS) 5 mg Tab Take 1 tablet (5 mg total) by mouth 2 (two) times daily.    atorvastatin (LIPITOR) 80 MG tablet Take 1 tablet (80 mg total) by mouth once daily.    blood sugar diagnostic Strp 1 each by Misc.(Non-Drug; Combo Route) route 3 (three) times daily.    blood sugar diagnostic Strp Use to check  "blood glucose 2 (two) times a day.    blood-glucose meter Misc TEST TWICE DAILY    blood-glucose sensor (DEXCOM G7 SENSOR) Radha 1 each by Misc.(Non-Drug; Combo Route) route once daily.    calcitRIOL (ROCALTROL) 0.5 MCG Cap Take 1 capsule (0.5 mcg total) by mouth once daily.    calcium acetate,phosphat bind, (PHOSLO) 667 mg capsule Take 1 capsule (667 mg total) by mouth 3 (three) times daily with meals.    carvediloL (COREG) 6.25 MG tablet Take 1 tablet (6.25 mg total) by mouth 2 (two) times daily.    clopidogreL (PLAVIX) 75 mg tablet Take 1 tablet (75 mg total) by mouth once daily.    dextrose (GLUTOSE) 40 % gel Take 37.5 mLs (15,000 mg total) by mouth once. for 1 dose    epoetin nina (PROCRIT) 4,000 unit/mL injection Inject 1 mL (4,000 Units total) into the skin every Mon, Wed, Fri.    famotidine (PEPCID) 20 MG tablet Take 1 tablet (20 mg total) by mouth once daily. (Patient not taking: Reported on 10/15/2024)    furosemide (LASIX) 40 MG tablet Take 1 tablet (40 mg total) by mouth 2 (two) times a day.    hydrALAZINE (APRESOLINE) 50 MG tablet Take 1 tablet (50 mg total) by mouth 3 (three) times daily.    insulin aspart U-100 (NOVOLOG) 100 unit/mL (3 mL) InPn pen Inject 3 Units into the skin 3 (three) times daily with meals. (Patient not taking: Reported on 10/15/2024)    insulin glargine U-100, Lantus, (LANTUS U-100 INSULIN) 100 unit/mL injection Inject 8 Units into the skin every evening.    lancets 33 gauge Misc TEST 3 TIMES DAILY    multivitamin (ONE DAILY MULTIVITAMIN) per tablet Take 1 tablet by mouth once daily.    pen needle, diabetic 32 gauge x 5/32" Ndle 1 Units by Misc.(Non-Drug; Combo Route) route before meals as needed (SSI).    pen needle, diabetic 33 gauge x 5/32" Ndle 1 each by Misc.(Non-Drug; Combo Route) route 3 (three) times daily.    pregabalin (LYRICA) 50 MG capsule Take 1 capsule (50 mg total) by mouth 3 (three) times a week.    sertraline (ZOLOFT) 100 MG tablet Take 1 tablet (100 mg total) by " mouth once daily.    sodium bicarbonate 650 MG tablet Take 1 tablet (650 mg total) by mouth 3 (three) times daily.    traZODone (DESYREL) 50 MG tablet Take 0.5 tablets (25 mg total) by mouth every evening.    wound dressings (TRIAD WOUND DRESSING) Pste Apply 2 g topically once daily.    [DISCONTINUED] lancing device Misc 1 Device by Misc.(Non-Drug; Combo Route) route 2 (two) times daily with meals.       Review of patient's allergies indicates:   Allergen Reactions    Ciprofloxacin Itching    Iodine      Kidney injury    Pcn [penicillins]      Rash; tolerated ceftriaxone on 1/13/20       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    Osteomyelitis     Osteomyelitis of left foot 04/30/2021    Ulcer of left foot     Vaginal delivery     x1     Past Surgical History:   Procedure Laterality Date    ABOVE-KNEE AMPUTATION Left 5/18/2021    Procedure: AMPUTATION, ABOVE KNEE;  Surgeon: Teddy Huber MD;  Location: [address];  Service: Vascular;  Laterality: Left;    ABOVE-KNEE AMPUTATION Right 3/18/2022    Procedure: AMPUTATION, ABOVE KNEE;  Surgeon: DAYNE Florez II, MD;  Location: [address];  Service: Vascular;  Laterality: Right;    Angiogram - Right Extremity Right 7/9/15    angiogram-left leg  10/6/15    ANGIOGRAPHY OF LOWER EXTREMITY Left 4/29/2021    Procedure: ANGIOGRAM, LOWER EXTREMITY;  Surgeon: Teddy Huber MD;  Location: [address];  Service: Vascular;  Laterality: Left;    BELOW KNEE AMPUTATION OF LOWER EXTREMITY Right 12/28/2021    Procedure: AMPUTATION, BELOW KNEE;  Surgeon: Kaitlyn Rojas MD;  Location: [facility] OR;  Service: General;  Laterality: Right;    CATHETERIZATION OF BOTH LEFT AND RIGHT HEART  N/A 12/18/2019    Procedure: CATHETERIZATION, HEART, BOTH LEFT AND RIGHT;  Surgeon: Que Fernando III, MD;  Location: [facility] CATH LAB;  Service: Cardiology;  Laterality: N/A;    CORONARY ANGIOGRAPHY N/A 12/18/2019    Procedure: ANGIOGRAM, CORONARY ARTERY;  Surgeon: Que Fernando III, MD;  Location: [facility] CATH LAB;  Service: Cardiology;  Laterality: N/A;    CORONARY ANGIOGRAPHY INCLUDING BYPASS GRAFTS WITH CATHETERIZATION OF LEFT HEART N/A 7/28/2020    Procedure: ANGIOGRAM, CORONARY, INCLUDING BYPASS GRAFT, WITH LEFT HEART CATHETERIZATION, 9 am;  Surgeon: Rachel Easley MD;  Location: [facility] CATH LAB;  Service: Cardiology;  Laterality: N/A;    CORONARY ARTERY BYPASS GRAFT (CABG) N/A 1/14/2020    Procedure: CORONARY ARTERY BYPASS GRAFT (CABG) x 1     Off Pump;  Surgeon: Huang Altamirano MD;  Location: [address];  Service: Cardiovascular;  Laterality: N/A;    CREATION OF FEMORAL-TIBIAL ARTERY BYPASS Left 4/29/2021    Procedure: CREATION, BYPASS, ARTERIAL, FEMORAL TO ANTERIOR TIBIAL;  Surgeon: Teddy Huber MD;  [facility];  Service: Vascular;  Laterality: Left;    CREATION OF FEMOROPOPLITEAL ARTERIAL BYPASS USING GRAFT Left 8/18/2020    Procedure: CREATION, BYPASS, ARTERIAL, FEMORAL TO POPLITEAL, USING GRAFT, LEFT LOWER EXTREMITY;  Surgeon: Teddy Huber MD;  Location: [facility];  Service: Vascular;  Laterality: Left;  REQUEST 7:15 A.M. START----COVID NEGATIVE ON 8/17  1ST CASE STARTE PER LEANA ON 8/7/2020 @ 942AM-LO  RN PREOP 8/12/2020   T/S-----CLEARED BY CARDS-------PENDING INSURANCE    DEBRIDEMENT OF FOOT Left 9/8/2020    Procedure: DEBRIDEMENT, FOOT;  Surgeon: Rosio Mayes DPM;  Location: [facility] OR;  Service: Podiatry;  Laterality: Left;  request neoxx .   RN Pre Op 9-4-2020, Covid negative on 9/5/20. C A    DEBRIDEMENT OF FOOT  3/4/2021    Procedure: DEBRIDEMENT, FOOT;  Surgeon: Teddy Huber MD;  Location: [facility] OR;  Service: Vascular;;    DEBRIDEMENT OF FOOT  Left 3/9/2021    Procedure: DEBRIDEMENT, FOOT, bone biopsy;  Surgeon: Rosio Mayes DPM;  Location: [facility] OR;  Service: Podiatry;  Laterality: Left;  Request neoxx---COVID IN AM  REQUESTING NOON START  RN Phone Pre op.On Blood thinners Plavix and Eliquis.  Covid am of surgery. C A    DEBRIDEMENT OF FOOT Left 5/4/2021    Procedure: DEBRIDEMENT, FOOT;  Surgeon: Farooq Morley DPM;  Location: [facility] OR 2ND FLR;  Service: Podiatry;  Laterality: Left;    INSERTION OF TUNNELED CENTRAL VENOUS HEMODIALYSIS CATHETER N/A 1/27/2020    Procedure: Insertion, Catheter, Central Venous, Hemodialysis;  Surgeon: ESTEBAN Gomez III, MD;  Location: [facility] CATH LAB;  Service: Peripheral Vascular;  Laterality: N/A;    INSERTION OF TUNNELED CENTRAL VENOUS HEMODIALYSIS CATHETER  5/10/2023    Procedure: Insertion, Catheter, Central Venous, Hemodialysis;  Surgeon: Romulo Queen MD;  Location: [facility] CATH LAB;  Service: Cardiology;;    PERCUTANEOUS TRANSLUMINAL ANGIOPLASTY N/A 3/4/2021    Procedure: PTA (ANGIOPLASTY, PERCUTANEOUS, TRANSLUMINAL);  Surgeon: Teddy Huber MD;  Location: [facility] OR;  Service: Vascular;  Laterality: N/A;    REMOVAL OF ARTERIOVENOUS GRAFT Left 5/27/2021    Procedure: REMOVAL, GRAFT, LEFT LOWER EXTREMITY, WOUND EXPLORATION;  Surgeon: Teddy Huber MD;  [address]  Service: Vascular;  Laterality: Left;    REMOVAL OF NAIL OF DIGIT Left 3/9/2021    Procedure: REMOVAL, NAIL, DIGIT;  Surgeon: Rosio Mayes DPM;  Location: [facility] OR;  Service: Podiatry;  Laterality: Left;    RIGHT HEART CATHETERIZATION Right 5/10/2023    Procedure: INSERTION, CATHETER, RIGHT HEART;  Surgeon: Romulo Queen MD;  Location: [facility] CATH LAB;  Service: Cardiology;  Laterality: Right;    THROMBECTOMY Left 3/4/2021    Procedure: THROMBECTOMY, LEFT LOWER EXTREMITY BYPASS GRAFT, ANGIOGRAM, POSSIBLE INTERVENTION, POSSIBLE LEFT LOWER EXTREMITY BYPASS;  Surgeon: Teddy Huber MD;  Location: [facility] OR;  Service:  Vascular;  Laterality: Left;    THROMBECTOMY Left 4/29/2021    Procedure: GRAFT THROMBECTOMY, LEFT LOWER EXTREMITY;  Surgeon: Teddy Huber MD;  Location: [facility] OR [address] [place] [address];  Service: Vascular;  Laterality: Left;  14.5 min  1179.85 mGy  341.01 Gycm2  240 ml dye    THROMBECTOMY  10/22/2021    Procedure: THROMBECTOMY;  Surgeon: Saad Arenas MD;  Location: [facility] CATH LAB/EP;  Service: Cardiology;;     Family History       Problem Relation (Age of Onset)    Diabetes Mother, Father, Paternal Grandmother    Heart disease Maternal Grandmother    No Known Problems Maternal Grandfather, Paternal Grandfather          Tobacco Use    Smoking status: Former    Smokeless tobacco: Never   Substance and Sexual Activity    Alcohol use: No    Drug use: Yes     Types: Marijuana     Comment: occassional    Sexual activity: Yes     Partners: Male     Review of Systems   Reason unable to perform ROS: on Bipap.     Objective:     Vital Signs (Most Recent):  Temp: 98.3 °F (36.8 °C) (10/29/24 1115)  Pulse: 77 (10/29/24 1145)  Resp: 15 (10/29/24 1145)  BP: (!) 92/58 (10/29/24 1133)  SpO2: 100 % (10/29/24 1145) Vital Signs (24h Range):  Temp:  [98.1 °F (36.7 °C)-101.6 °F (38.7 °C)] 98.3 °F (36.8 °C)  Pulse:  [] 77  Resp:  [11-21] 15  SpO2:  [85 %-100 %] 100 %  BP: ()/(37-77) 92/58     Weight: 68 kg (150 lb)  Body mass index is 45.77 kg/m².     Physical Exam  Constitutional:       General: She is not in acute distress.     Appearance: Normal appearance.   HENT:      Head: Normocephalic and atraumatic.   Eyes:      Extraocular Movements: Extraocular movements intact.   Cardiovascular:      Rate and Rhythm: Normal rate.   Pulmonary:      Effort: Pulmonary effort is normal. No respiratory distress.   Musculoskeletal:         General: Normal range of motion.      Comments: B/l AKA   Skin:     General: Skin is warm and dry.      Comments: Wound breakdown at medial aspect of L AKA. Some surrounding induration. No noted fluctuance  or drainage   Neurological:      Mental Status: She is alert.            I have reviewed all pertinent lab results within the past 24 hours.  CBC:   Recent Labs   Lab 10/29/24  0856   WBC 9.11   RBC 2.75*   HGB 9.1*   HCT 29.3*      *   MCH 33.1*   MCHC 31.1*     CMP:   Recent Labs   Lab 10/29/24  0856   *   CALCIUM 8.3*   ALBUMIN 2.1*   PROT 7.4   *   K 4.0   CO2 24   CL 97   BUN 44*   CREATININE 5.5*   ALKPHOS 530*   ALT 35   *   BILITOT 0.8       Significant Diagnostics:  I have reviewed all pertinent imaging results/findings within the past 24 hours.    Assessment/Plan:     S/P AKA (above knee amputation) bilateral  Suyapa Connelly is a 58yo F with b/l AKAs and wound breakdown at L AKA. General Surgery consulted for concern for nec fasc.     - Low suspicion for nec fasc.. Patient with appropriate pain in area surrounding wound. Area of minimal subcutaneous emphysema on CT scan located at area of wound breakdown suspect is due to breakdown/opening in that area. No noted organized fluid collection consistent with abscess. No purulent drainage or fluctuance.   - WBC wnl. Lactate wnl.   - Mental status improved without surgical intervention  - Continue recommendations per Wound Care  - Abx per primary team        Thank you for your consult. I will follow-up with patient. Please contact us if you have any additional questions.    Itzel Kuo MD  General Surgery  [facility] - Intensive Care

## 2024-10-29 NOTE — ASSESSMENT & PLAN NOTE
L AKA- necrotic amputation surgical site- developing eschar 9x15x0.2cm and blisters to periwound  Continue IV antibiotics   General surgery consulted for wound debridement. Recs: continue recommendations per Wound Care   - Aquacel AG daily to L AKA

## 2024-10-29 NOTE — NURSING
Pt vital signs stable. Transfer center called with [phone] rm # for pt, Dr Anton Gates is the accepting Dr. Report given to DEANGELO Henderson. Pt went to [facility] by EMT.

## 2024-10-29 NOTE — SUBJECTIVE & OBJECTIVE
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    Osteomyelitis     Osteomyelitis of left foot 04/30/2021    Ulcer of left foot     Vaginal delivery     x1       Past Surgical History:   Procedure Laterality Date    ABOVE-KNEE AMPUTATION Left 5/18/2021    Procedure: AMPUTATION, ABOVE KNEE;  Surgeon: Teddy Huber MD;  Location: [facility] OR [address] [place] [address];  Service: Vascular;  Laterality: Left;    ABOVE-KNEE AMPUTATION Right 3/18/2022    Procedure: AMPUTATION, ABOVE KNEE;  Surgeon: DAYNE Florez II, MD;  Location: [facility] OR [address] [place] [address];  Service: Vascular;  Laterality: Right;    Angiogram - Right Extremity Right 7/9/15    angiogram-left leg  10/6/15    ANGIOGRAPHY OF LOWER EXTREMITY Left 4/29/2021    Procedure: ANGIOGRAM, LOWER EXTREMITY;  Surgeon: Teddy Huber MD;  Location: [facility] OR [address] [place] [address];  Service: Vascular;  Laterality: Left;    BELOW KNEE AMPUTATION OF LOWER EXTREMITY Right 12/28/2021    Procedure: AMPUTATION, BELOW KNEE;  Surgeon: Kaitlyn Rojas MD;  Location: [facility] OR;  Service: General;  Laterality: Right;    CATHETERIZATION OF BOTH LEFT AND RIGHT HEART N/A 12/18/2019    Procedure: CATHETERIZATION, HEART, BOTH LEFT AND RIGHT;  Surgeon: Que Fernando III, MD;  Location: [facility] CATH LAB;  Service: Cardiology;  Laterality: N/A;    CORONARY ANGIOGRAPHY N/A 12/18/2019    Procedure: ANGIOGRAM, CORONARY ARTERY;  Surgeon: Que Fernando III, MD;  Location: [facility] CATH LAB;  Service: Cardiology;  Laterality: N/A;    CORONARY ANGIOGRAPHY INCLUDING BYPASS GRAFTS WITH CATHETERIZATION OF LEFT HEART N/A 7/28/2020    Procedure: ANGIOGRAM, CORONARY, INCLUDING BYPASS GRAFT, WITH LEFT HEART CATHETERIZATION, 9 am;  Surgeon: Rachel Easley MD;   Location: [facility] CATH LAB;  Service: Cardiology;  Laterality: N/A;    CORONARY ARTERY BYPASS GRAFT (CABG) N/A 1/14/2020    Procedure: CORONARY ARTERY BYPASS GRAFT (CABG) x 1     Off Pump;  Surgeon: Huang Altamirano MD;  Location: [facility] OR [address] [place] [address];  Service: Cardiovascular;  Laterality: N/A;    CREATION OF FEMORAL-TIBIAL ARTERY BYPASS Left 4/29/2021    Procedure: CREATION, BYPASS, ARTERIAL, FEMORAL TO ANTERIOR TIBIAL;  Surgeon: Teddy Huber MD;  Location: [facility] OR [address] [place] [address];  Service: Vascular;  Laterality: Left;    CREATION OF FEMOROPOPLITEAL ARTERIAL BYPASS USING GRAFT Left 8/18/2020    Procedure: CREATION, BYPASS, ARTERIAL, FEMORAL TO POPLITEAL, USING GRAFT, LEFT LOWER EXTREMITY;  Surgeon: Teddy Huber MD;  Location: [facility];  Service: Vascular;  Laterality: Left;  REQUEST 7:15 A.M. START----COVID NEGATIVE ON 8/17 1ST CASE STARTE PER LEANA ON 8/7/2020 @ 942AM-  RN PREOP 8/12/2020   T/S-----CLEARED BY CARDS-------PENDING INSURANCE    DEBRIDEMENT OF FOOT Left 9/8/2020    Procedure: DEBRIDEMENT, FOOT;  Surgeon: Rosio Mayes DPM;  Location: [facility];  Service: Podiatry;  Laterality: Left;  request neoxx .   RN Pre Op 9-4-2020, Covid negative on 9/5/20. C A    DEBRIDEMENT OF FOOT  3/4/2021    Procedure: DEBRIDEMENT, FOOT;  Surgeon: Teddy Huber MD;  Location: [facility] OR;  Service: Vascular;;    DEBRIDEMENT OF FOOT Left 3/9/2021    Procedure: DEBRIDEMENT, FOOT, bone biopsy;  Surgeon: Rosio Mayes DPM;  Location: [facility] OR;  Service: Podiatry;  Laterality: Left;  Request neoxx---COVID IN AM  REQUESTING NOON START  RN Phone Pre op.On Blood thinners Plavix and Eliquis.  Covid am of surgery. C A    DEBRIDEMENT OF FOOT Left 5/4/2021    Procedure: DEBRIDEMENT, FOOT;  Surgeon: Farooq Morley DPM;  Location: [address];  Service: Podiatry;  Laterality: Left;    INSERTION OF TUNNELED CENTRAL VENOUS HEMODIALYSIS CATHETER N/A 1/27/2020    Procedure: Insertion, Catheter, Central Venous,  Hemodialysis;  Surgeon: ESTEBAN Gomez III, MD;  Location: [facility] CATH LAB;  Service: Peripheral Vascular;  Laterality: N/A;    INSERTION OF TUNNELED CENTRAL VENOUS HEMODIALYSIS CATHETER  5/10/2023    Procedure: Insertion, Catheter, Central Venous, Hemodialysis;  Surgeon: Romulo Queen MD;  Location: [facility] CATH LAB;  Service: Cardiology;;    PERCUTANEOUS TRANSLUMINAL ANGIOPLASTY N/A 3/4/2021    Procedure: PTA (ANGIOPLASTY, PERCUTANEOUS, TRANSLUMINAL);  Surgeon: Teddy Huber MD;  Location: [facility] OR;  Service: Vascular;  Laterality: N/A;    REMOVAL OF ARTERIOVENOUS GRAFT Left 5/27/2021    Procedure: REMOVAL, GRAFT, LEFT LOWER EXTREMITY, WOUND EXPLORATION;  Surgeon: Teddy Huber MD;  Location: [facility] OR 2ND FLR;  Service: Vascular;  Laterality: Left;    REMOVAL OF NAIL OF DIGIT Left 3/9/2021    Procedure: REMOVAL, NAIL, DIGIT;  Surgeon: Rosio Mayes DPM;  Location: [facility] OR;  Service: Podiatry;  Laterality: Left;    RIGHT HEART CATHETERIZATION Right 5/10/2023    Procedure: INSERTION, CATHETER, RIGHT HEART;  Surgeon: Romulo Queen MD;  Location: [facility] CATH LAB;  Service: Cardiology;  Laterality: Right;    THROMBECTOMY Left 3/4/2021    Procedure: THROMBECTOMY, LEFT LOWER EXTREMITY BYPASS GRAFT, ANGIOGRAM, POSSIBLE INTERVENTION, POSSIBLE LEFT LOWER EXTREMITY BYPASS;  Surgeon: Teddy Huber MD;  Location: [facility] OR;  Service: Vascular;  Laterality: Left;    THROMBECTOMY Left 4/29/2021    Procedure: GRAFT THROMBECTOMY, LEFT LOWER EXTREMITY;  Surgeon: Teddy Huber MD;  Location: [facility] OR 2ND FLR;  Service: Vascular;  Laterality: Left;  14.5 min  1179.85 mGy  341.01 Gycm2  240 ml dye    THROMBECTOMY  10/22/2021    Procedure: THROMBECTOMY;  Surgeon: Saad Arenas MD;  Location: [facility] CATH LAB/EP;  Service: Cardiology;;       Review of patient's allergies indicates:   Allergen Reactions    Ciprofloxacin Itching    Iodine      Kidney injury    Pcn [penicillins]      Rash; tolerated ceftriaxone  on 1/13/20       Current Facility-Administered Medications on File Prior to Encounter   Medication    [DISCONTINUED] acetaminophen tablet 650 mg    [DISCONTINUED] apixaban tablet 5 mg    [DISCONTINUED] atorvastatin tablet 80 mg    [DISCONTINUED] carvediloL tablet 6.25 mg    [DISCONTINUED] ceFEPIme injection 1 g    [DISCONTINUED] ceFEPIme injection 1 g    [DISCONTINUED] clopidogreL tablet 75 mg    [DISCONTINUED] dextrose 10% bolus 125 mL 125 mL    [DISCONTINUED] dextrose 10% bolus 250 mL 250 mL    [DISCONTINUED] furosemide tablet 40 mg    [DISCONTINUED] gabapentin capsule 100 mg    [DISCONTINUED] glucagon (human recombinant) injection 1 mg    [DISCONTINUED] glucose chewable tablet 16 g    [DISCONTINUED] glucose chewable tablet 24 g    [DISCONTINUED] heparin (porcine) injection 3,600 Units    [DISCONTINUED] insulin aspart U-100 pen 0-10 Units    [DISCONTINUED] insulin glargine U-100 (Lantus) pen 5 Units    [DISCONTINUED] melatonin tablet 6 mg    [DISCONTINUED] naloxone 0.4 mg/mL injection 0.02 mg    [DISCONTINUED] ondansetron injection 4 mg    [DISCONTINUED] oxyCODONE-acetaminophen 5-325 mg per tablet 1 tablet    [DISCONTINUED] potassium, sodium phosphates 280-160-250 mg packet 1 packet    [DISCONTINUED] senna-docusate 8.6-50 mg per tablet 1 tablet    [DISCONTINUED] sertraline tablet 100 mg    [DISCONTINUED] sodium chloride 0.9% flush 10 mL    [DISCONTINUED] trazodone split tablet 25 mg    [DISCONTINUED] vancomycin - pharmacy to dose    [DISCONTINUED] vancomycin 1,500 mg in D5W 250 mL IVPB (admixture device)     Current Outpatient Medications on File Prior to Encounter   Medication Sig    acetaminophen (TYLENOL) 500 MG tablet Take 2 tablets (1,000 mg total) by mouth every 8 (eight) hours as needed for Pain. (Patient taking differently: Take 650 mg by mouth every 8 (eight) hours as needed for Pain.)    allopurinoL (ZYLOPRIM) 100 MG tablet Take 0.5 tablets (50 mg total) by mouth every other day.    apixaban (ELIQUIS) 5  "mg Tab Take 1 tablet (5 mg total) by mouth 2 (two) times daily.    atorvastatin (LIPITOR) 80 MG tablet Take 1 tablet (80 mg total) by mouth once daily.    blood sugar diagnostic Strp 1 each by Misc.(Non-Drug; Combo Route) route 3 (three) times daily.    blood sugar diagnostic Strp Use to check blood glucose 2 (two) times a day.    blood-glucose meter Misc TEST TWICE DAILY    blood-glucose sensor (DEXCOM G7 SENSOR) Radha 1 each by Misc.(Non-Drug; Combo Route) route once daily.    calcitRIOL (ROCALTROL) 0.5 MCG Cap Take 1 capsule (0.5 mcg total) by mouth once daily.    calcium acetate,phosphat bind, (PHOSLO) 667 mg capsule Take 1 capsule (667 mg total) by mouth 3 (three) times daily with meals.    carvediloL (COREG) 6.25 MG tablet Take 1 tablet (6.25 mg total) by mouth 2 (two) times daily.    clopidogreL (PLAVIX) 75 mg tablet Take 1 tablet (75 mg total) by mouth once daily.    dextrose (GLUTOSE) 40 % gel Take 37.5 mLs (15,000 mg total) by mouth once. for 1 dose    epoetin nina (PROCRIT) 4,000 unit/mL injection Inject 1 mL (4,000 Units total) into the skin every Mon, Wed, Fri.    famotidine (PEPCID) 20 MG tablet Take 1 tablet (20 mg total) by mouth once daily. (Patient not taking: Reported on 10/15/2024)    furosemide (LASIX) 40 MG tablet Take 1 tablet (40 mg total) by mouth 2 (two) times a day.    hydrALAZINE (APRESOLINE) 50 MG tablet Take 1 tablet (50 mg total) by mouth 3 (three) times daily.    insulin aspart U-100 (NOVOLOG) 100 unit/mL (3 mL) InPn pen Inject 3 Units into the skin 3 (three) times daily with meals. (Patient not taking: Reported on 10/15/2024)    insulin glargine U-100, Lantus, (LANTUS U-100 INSULIN) 100 unit/mL injection Inject 8 Units into the skin every evening.    lancets 33 gauge Misc TEST 3 TIMES DAILY    multivitamin (ONE DAILY MULTIVITAMIN) per tablet Take 1 tablet by mouth once daily.    pen needle, diabetic 32 gauge x 5/32" Ndle 1 Units by Misc.(Non-Drug; Combo Route) route before meals as " "needed (SSI).    pen needle, diabetic 33 gauge x 5/32" Ndle 1 each by Misc.(Non-Drug; Combo Route) route 3 (three) times daily.    pregabalin (LYRICA) 50 MG capsule Take 1 capsule (50 mg total) by mouth 3 (three) times a week.    sertraline (ZOLOFT) 100 MG tablet Take 1 tablet (100 mg total) by mouth once daily.    sodium bicarbonate 650 MG tablet Take 1 tablet (650 mg total) by mouth 3 (three) times daily.    traZODone (DESYREL) 50 MG tablet Take 0.5 tablets (25 mg total) by mouth every evening.    wound dressings (TRIAD WOUND DRESSING) Pste Apply 2 g topically once daily.    [DISCONTINUED] lancing device Misc 1 Device by Misc.(Non-Drug; Combo Route) route 2 (two) times daily with meals.     Family History       Problem Relation (Age of Onset)    Diabetes Mother, Father, Paternal Grandmother    Heart disease Maternal Grandmother    No Known Problems Maternal Grandfather, Paternal Grandfather          Tobacco Use    Smoking status: Former    Smokeless tobacco: Never   Substance and Sexual Activity    Alcohol use: No    Drug use: Yes     Types: Marijuana     Comment: occassional    Sexual activity: Yes     Partners: Male     Review of Systems   Constitutional:  Positive for activity change, appetite change and fever.   HENT: Negative.     Eyes: Negative.    Respiratory: Negative.     Cardiovascular: Negative.    Gastrointestinal: Negative.    Musculoskeletal: Negative.    Skin:  Positive for color change and rash.   Neurological: Negative.    Psychiatric/Behavioral: Negative.       Objective:     Vital Signs (Most Recent):  Temp: 99.6 °F (37.6 °C) (10/28/24 2040)  Pulse: 91 (10/28/24 2040)  Resp: 20 (10/28/24 2040)  BP: 102/62 (10/28/24 2228)  SpO2: (!) 91 % (10/28/24 2040) Vital Signs (24h Range):  Temp:  [97.6 °F (36.4 °C)-99.7 °F (37.6 °C)] 99.6 °F (37.6 °C)  Pulse:  [] 91  Resp:  [16-20] 20  SpO2:  [91 %-100 %] 91 %  BP: ()/(54-66) 102/62     Weight: 68.4 kg (150 lb 12.7 oz)  Body mass index is 46.02 " "kg/m².     Physical Exam  Constitutional:       Appearance: She is ill-appearing.   HENT:      Head: Normocephalic.      Nose: Nose normal.      Mouth/Throat:      Mouth: Mucous membranes are moist.   Eyes:      Extraocular Movements: Extraocular movements intact.      Conjunctiva/sclera: Conjunctivae normal.      Pupils: Pupils are equal, round, and reactive to light.   Cardiovascular:      Rate and Rhythm: Normal rate.   Pulmonary:      Effort: Pulmonary effort is normal.   Abdominal:      General: Abdomen is flat. Bowel sounds are normal.   Musculoskeletal:         General: Normal range of motion.      Cervical back: Normal range of motion.   Skin:     Capillary Refill: Capillary refill takes less than 2 seconds.      Coloration: Skin is pale.      Findings: Erythema, lesion and rash present.   Neurological:      General: No focal deficit present.      Mental Status: She is alert and oriented to person, place, and time.   Psychiatric:         Mood and Affect: Mood normal.              CRANIAL NERVES     CN III, IV, VI   Pupils are equal, round, and reactive to light.       Significant Labs: All pertinent labs within the past 24 hours have been reviewed.  Blood Culture: No results for input(s): "LABBLOO" in the last 48 hours.  BMP:   Recent Labs   Lab 10/28/24  0529   *   *   K 3.4*   CL 98   CO2 26   BUN 28*   CREATININE 4.0*   CALCIUM 8.0*   MG 1.8     CBC:   Recent Labs   Lab 10/27/24  0528 10/28/24  0529   WBC 10.48 11.20   HGB 10.2* 9.2*   HCT 30.7* 28.2*    257     CMP:   Recent Labs   Lab 10/27/24  0528 10/28/24  0529    135*   K 3.4* 3.4*    98   CO2 24 26   GLU 92 135*   BUN 15 28*   CREATININE 2.9* 4.0*   CALCIUM 8.3* 8.0*   PROT 8.1 7.8   ALBUMIN 2.2* 2.2*   BILITOT 0.6 0.6   ALKPHOS 421* 447*   AST 37 76*   ALT 20 23   ANIONGAP 12 11     Cardiac Markers: No results for input(s): "CKMB", "MYOGLOBIN", "BNP", "TROPISTAT" in the last 48 hours.  Coagulation: No results for " "input(s): "PT", "INR", "APTT" in the last 48 hours.  Lactic Acid: No results for input(s): "LACTATE" in the last 48 hours.  Lipase: No results for input(s): "LIPASE" in the last 48 hours.  Lipid Panel: No results for input(s): "CHOL", "HDL", "LDLCALC", "TRIG", "CHOLHDL" in the last 48 hours.  Magnesium:   Recent Labs   Lab 10/27/24  0528 10/28/24  0529   MG 1.8 1.8       Significant Imaging: I have reviewed all pertinent imaging results/findings within the past 24 hours.  I have reviewed and interpreted all pertinent imaging results/findings within the past 24 hours.  "

## 2024-10-29 NOTE — NURSING
VIRTUAL NURSE: Pt arrived to unit. Permission received to turn camera to view patient. VIP model explained; Patient informed this VN will be working with bedside nurse and the rest of the care team.      Admission questions completed.  Plan of care reviewed with patient.  Educated patient on VTE and fall risk. Safety precautions in place. Call light within reach, side rails up x2.     Patient instucted to ask staff for assistance. Patient verbalized complete understanding.  Will continue to be available and intervene as needed.    Labs, notes, orders, and careplan reviewed.      10/28/24 2138   Admission   Initial VN Admission Questions Complete   Shift   Virtual Nurse - Rounding Complete   Virtual Nurse - Patient Verbalized Approval Of Camera Use;VN Rounding   Type of Frequent Check   Type Patient Rounds   Safety/Activity   Patient Rounds bed in low position;bed wheels locked;call light in patient/parent reach;clutter free environment maintained;visualized patient;placement of personal items at bedside   Safety Promotion/Fall Prevention assistive device/personal item within reach   Safety Precautions emergency equipment at bedside   Activity Assistance Provided assistance, 1 person   Positioning   Body Position position changed independently   Head of Bed (HOB) Positioning HOB elevated

## 2024-10-29 NOTE — PROGRESS NOTES
Pharmacokinetic Initial Assessment: IV Vancomycin    Assessment/Plan:    Initiate intravenous vancomycin with loading dose of 1250 mg once with subsequent doses when random concentrations are less than 25 mcg/mL  Desired empiric serum trough concentration is 10 to 15 mcg/mL  Draw vancomycin random level on 10/29 at 0400.  Pharmacy will continue to follow and monitor vancomycin.      Please contact pharmacy at extension [phone] with any questions regarding this assessment.     Thank you for the consult,   Mc Mtz       Patient brief summary:  Suyapa Connelly is a 59 y.o. female initiated on antimicrobial therapy with IV Vancomycin for treatment of suspected skin & soft tissue infection    Drug Allergies:   Review of patient's allergies indicates:   Allergen Reactions    Ciprofloxacin Itching    Iodine      Kidney injury    Pcn [penicillins]      Rash; tolerated ceftriaxone on 1/13/20       Actual Body Weight:   68kg    Renal Function:   Estimated Creatinine Clearance: 11.3 mL/min (A) (based on SCr of 4 mg/dL (H)).,     Dialysis Method (if applicable):  intermittent HD    CBC (last 72 hours):  Recent Labs   Lab Result Units 10/26/24  0524 10/27/24  0528 10/28/24  0529   WBC K/uL 9.78 10.48 11.20   Hemoglobin g/dL 10.1* 10.2* 9.2*   Hematocrit % 30.8* 30.7* 28.2*   Platelets K/uL 265 251 257   Gran % % 81.1* 78.7* 79.1*   Lymph % % 5.3* 5.9* 6.0*   Mono % % 12.4 14.6 14.2   Eosinophil % % 1.0 0.5 0.4   Basophil % % 0.2 0.3 0.3   Differential Method  Automated Automated Automated       Metabolic Panel (last 72 hours):  Recent Labs   Lab Result Units 10/26/24  0524 10/27/24  0528 10/28/24  0529   Sodium mmol/L 135* 136 135*   Potassium mmol/L 3.2* 3.4* 3.4*   Chloride mmol/L 103 100 98   CO2 mmol/L 19* 24 26   Glucose mg/dL 98 92 135*   BUN mg/dL 22* 15 28*   Creatinine mg/dL 4.0* 2.9* 4.0*   Albumin g/dL 2.4* 2.2* 2.2*   Total Bilirubin mg/dL 0.5 0.6 0.6   Alkaline Phosphatase U/L 433* 421* 447*   AST U/L 38 37  "76*   ALT U/L 21 20 23   Magnesium mg/dL 1.9 1.8 1.8   Phosphorus mg/dL 3.0 1.8* 2.3*       Drug levels (last 3 results):  No results for input(s): "VANCOMYCINRA", "VANCORANDOM", "VANCOMYCINPE", "VANCOPEAK", "VANCOMYCINTR", "VANCOTROUGH" in the last 72 hours.    Microbiologic Results:  Microbiology Results (last 7 days)       ** No results found for the last 168 hours. **            "

## 2024-10-29 NOTE — ASSESSMENT & PLAN NOTE
This patient has shock. The type of shock is distributive due to sepsis. The patient has the following evidence of shock: altered mental status. The patient will be admitted to an intensive care unit, they will be treated with IV abx.    Patient did not require IVF resuscitation or pressors. BP recovered.

## 2024-10-30 LAB
ADENOVIRUS: NOT DETECTED
ALBUMIN SERPL BCP-MCNC: 2 G/DL (ref 3.5–5.2)
ALP SERPL-CCNC: 491 U/L (ref 40–150)
ALT SERPL W/O P-5'-P-CCNC: 42 U/L (ref 10–44)
AMMONIA PLAS-SCNC: 42 UMOL/L (ref 10–50)
ANION GAP SERPL CALC-SCNC: 14 MMOL/L (ref 8–16)
APICAL FOUR CHAMBER EJECTION FRACTION: 40 %
APICAL TWO CHAMBER EJECTION FRACTION: 46 %
ASCENDING AORTA: 3.05 CM
AST SERPL-CCNC: 145 U/L (ref 10–40)
AV MEAN GRADIENT: 3.8 MMHG
AV PEAK GRADIENT: 5.8 MMHG
AV REGURGITATION PRESSURE HALF TIME: 891.72 MS
BASOPHILS # BLD AUTO: 0.05 K/UL (ref 0–0.2)
BASOPHILS NFR BLD: 0.5 % (ref 0–1.9)
BILIRUB SERPL-MCNC: 0.8 MG/DL (ref 0.1–1)
BORDETELLA PARAPERTUSSIS (IS1001): NOT DETECTED
BORDETELLA PERTUSSIS (PTXP): NOT DETECTED
BSA FOR ECHO PROCEDURE: 1.52 M2
BUN SERPL-MCNC: 27 MG/DL (ref 6–20)
CALCIUM SERPL-MCNC: 8.4 MG/DL (ref 8.7–10.5)
CHLAMYDIA PNEUMONIAE: NOT DETECTED
CHLORIDE SERPL-SCNC: 94 MMOL/L (ref 95–110)
CO2 SERPL-SCNC: 24 MMOL/L (ref 23–29)
CORONAVIRUS 229E, COMMON COLD VIRUS: NOT DETECTED
CORONAVIRUS HKU1, COMMON COLD VIRUS: NOT DETECTED
CORONAVIRUS NL63, COMMON COLD VIRUS: NOT DETECTED
CORONAVIRUS OC43, COMMON COLD VIRUS: NOT DETECTED
CREAT SERPL-MCNC: 3.5 MG/DL (ref 0.5–1.4)
CV ECHO LV RWT: 0.45 CM
DIFFERENTIAL METHOD BLD: ABNORMAL
DOP CALC AO PEAK VEL: 1.2 M/S
DOP CALC AO VTI: 24.2 CM
DOP CALC LVOT AREA: 3.1 CM2
DOP CALC LVOT DIAMETER: 2 CM
DOP CALC MV VTI: 16.4 CM
E WAVE DECELERATION TIME: 177.28 MSEC
E/A RATIO: 1.24
E/E' RATIO: 11.27 M/S
ECHO LV POSTERIOR WALL: 1 CM (ref 0.6–1.1)
EOSINOPHIL # BLD AUTO: 0.3 K/UL (ref 0–0.5)
EOSINOPHIL NFR BLD: 2.7 % (ref 0–8)
ERYTHROCYTE [DISTWIDTH] IN BLOOD BY AUTOMATED COUNT: 18.9 % (ref 11.5–14.5)
EST. GFR  (NO RACE VARIABLE): 15 ML/MIN/1.73 M^2
FLUBV RNA NPH QL NAA+NON-PROBE: NOT DETECTED
FRACTIONAL SHORTENING: 25 % (ref 28–44)
GLUCOSE SERPL-MCNC: 101 MG/DL (ref 70–110)
HBV SURFACE AG SERPL QL NT: NORMAL
HCT VFR BLD AUTO: 31 % (ref 37–48.5)
HGB BLD-MCNC: 9.7 G/DL (ref 12–16)
HPIV1 RNA NPH QL NAA+NON-PROBE: NOT DETECTED
HPIV2 RNA NPH QL NAA+NON-PROBE: NOT DETECTED
HPIV3 RNA NPH QL NAA+NON-PROBE: NOT DETECTED
HPIV4 RNA NPH QL NAA+NON-PROBE: NOT DETECTED
HUMAN METAPNEUMOVIRUS: NOT DETECTED
IMM GRANULOCYTES # BLD AUTO: 0.1 K/UL (ref 0–0.04)
IMM GRANULOCYTES NFR BLD AUTO: 0.9 % (ref 0–0.5)
INFLUENZA A (SUBTYPES H1,H1-2009,H3): NOT DETECTED
INTERVENTRICULAR SEPTUM: 1 CM (ref 0.6–1.1)
IVC DIAMETER: 3.32 CM
LEFT ATRIUM AREA SYSTOLIC (APICAL 4 CHAMBER): 17.75 CM2
LEFT INTERNAL DIMENSION IN SYSTOLE: 3.3 CM (ref 2.1–4)
LEFT VENTRICLE DIASTOLIC VOLUME INDEX: 60.51 ML/M2
LEFT VENTRICLE DIASTOLIC VOLUME: 85.32 ML
LEFT VENTRICLE END DIASTOLIC VOLUME APICAL 2 CHAMBER: 126.16 ML
LEFT VENTRICLE END DIASTOLIC VOLUME APICAL 4 CHAMBER: 101.45 ML
LEFT VENTRICLE END SYSTOLIC VOLUME APICAL 4 CHAMBER: 43.98 ML
LEFT VENTRICLE MASS INDEX: 104.8 G/M2
LEFT VENTRICLE SYSTOLIC VOLUME INDEX: 31.8 ML/M2
LEFT VENTRICLE SYSTOLIC VOLUME: 44.87 ML
LEFT VENTRICULAR INTERNAL DIMENSION IN DIASTOLE: 4.4 CM (ref 3.5–6)
LEFT VENTRICULAR MASS: 147.8 G
LV LATERAL E/E' RATIO: 8.86 M/S
LV SEPTAL E/E' RATIO: 15.5 M/S
LVED V (TEICH): 85.32 ML
LVES V (TEICH): 44.87 ML
LYMPHOCYTES # BLD AUTO: 0.6 K/UL (ref 1–4.8)
LYMPHOCYTES NFR BLD: 5.6 % (ref 18–48)
MAGNESIUM SERPL-MCNC: 1.8 MG/DL (ref 1.6–2.6)
MCH RBC QN AUTO: 33.6 PG (ref 27–31)
MCHC RBC AUTO-ENTMCNC: 31.3 G/DL (ref 32–36)
MCV RBC AUTO: 107 FL (ref 82–98)
MONOCYTES # BLD AUTO: 1.9 K/UL (ref 0.3–1)
MONOCYTES NFR BLD: 17.9 % (ref 4–15)
MR PISA EROA: 0.14 CM2
MV A" WAVE DURATION": 117.98 MSEC
MV PEAK A VEL: 0.5 M/S
MV PEAK E VEL: 0.62 M/S
MV PEAK GRADIENT: 2 MMHG
MV STENOSIS PRESSURE HALF TIME: 51.41 MS
MV VALVE AREA P 1/2 METHOD: 4.28 CM2
MYCOPLASMA PNEUMONIAE: NOT DETECTED
NEUTROPHILS # BLD AUTO: 7.7 K/UL (ref 1.8–7.7)
NEUTROPHILS NFR BLD: 72.4 % (ref 38–73)
NRBC BLD-RTO: 0 /100 WBC
OHS CV RV/LV RATIO: 1.59 CM
OHS LV EJECTION FRACTION SIMPSONS BIPLANE MOD: 42 %
PHOSPHATE SERPL-MCNC: 3.4 MG/DL (ref 2.7–4.5)
PISA AR MAX VEL: 3.14 M/S
PISA MRMAX VEL: 4.24 M/S
PISA RADIUS: 0.56 CM
PISA TR MAX VEL: 1.66 M/S
PISA VN NYQUIST MS: 0.31 M/S
PISA VN NYQUIST: 0.31 M/S
PLATELET # BLD AUTO: 263 K/UL (ref 150–450)
PMV BLD AUTO: 12 FL (ref 9.2–12.9)
POCT GLUCOSE: 121 MG/DL (ref 70–110)
POCT GLUCOSE: 125 MG/DL (ref 70–110)
POCT GLUCOSE: 170 MG/DL (ref 70–110)
POCT GLUCOSE: 199 MG/DL (ref 70–110)
POTASSIUM SERPL-SCNC: 4.3 MMOL/L (ref 3.5–5.1)
PROT SERPL-MCNC: 7.5 G/DL (ref 6–8.4)
PV MV: 0.54 M/S
PV PEAK GRADIENT: 2 MMHG
PV PEAK VELOCITY: 0.73 M/S
RA PRESSURE ESTIMATED: 15 MMHG
RA VOL SYS: 101.27 ML
RBC # BLD AUTO: 2.89 M/UL (ref 4–5.4)
RESPIRATORY INFECTION PANEL SOURCE: ABNORMAL
RIGHT ATRIAL AREA: 25.7 CM2
RIGHT ATRIUM VOLUME AREA LENGTH APICAL 4 CHAMBER: 97.36 ML
RIGHT VENTRICLE DIASTOLIC BASEL DIMENSION: 7 CM
RSV RNA NPH QL NAA+NON-PROBE: NOT DETECTED
RV TB RVSP: 17 MMHG
RV TISSUE DOPPLER FREE WALL SYSTOLIC VELOCITY 1 (APICAL 4 CHAMBER VIEW): 4.93 CM/S
RV+EV RNA NPH QL NAA+NON-PROBE: NOT DETECTED
SARS-COV-2 RNA RESP QL NAA+PROBE: DETECTED
SINUS: 2.82 CM
SODIUM SERPL-SCNC: 132 MMOL/L (ref 136–145)
STJ: 2.56 CM
TDI LATERAL: 0.07 M/S
TDI SEPTAL: 0.04 M/S
TDI: 0.06 M/S
TR MAX PG: 11 MMHG
TRICUSPID ANNULAR PLANE SYSTOLIC EXCURSION: 0.85 CM
TV REST PULMONARY ARTERY PRESSURE: 26 MMHG
VANCOMYCIN SERPL-MCNC: 19.1 UG/ML
WBC # BLD AUTO: 10.59 K/UL (ref 3.9–12.7)
Z-SCORE OF LEFT VENTRICULAR DIMENSION IN END DIASTOLE: 0.11
Z-SCORE OF LEFT VENTRICULAR DIMENSION IN END SYSTOLE: 1.58

## 2024-10-30 PROCEDURE — 36415 COLL VENOUS BLD VENIPUNCTURE: CPT | Mod: HCNC | Performed by: INTERNAL MEDICINE

## 2024-10-30 PROCEDURE — 80100016 HC MAINTENANCE HEMODIALYSIS: Mod: HCNC

## 2024-10-30 PROCEDURE — 94660 CPAP INITIATION&MGMT: CPT | Mod: HCNC

## 2024-10-30 PROCEDURE — 85025 COMPLETE CBC W/AUTO DIFF WBC: CPT | Mod: HCNC | Performed by: INTERNAL MEDICINE

## 2024-10-30 PROCEDURE — 82140 ASSAY OF AMMONIA: CPT | Mod: HCNC | Performed by: FAMILY MEDICINE

## 2024-10-30 PROCEDURE — 80053 COMPREHEN METABOLIC PANEL: CPT | Mod: HCNC | Performed by: INTERNAL MEDICINE

## 2024-10-30 PROCEDURE — 63600175 PHARM REV CODE 636 W HCPCS: Mod: HCNC | Performed by: INTERNAL MEDICINE

## 2024-10-30 PROCEDURE — 36415 COLL VENOUS BLD VENIPUNCTURE: CPT | Mod: HCNC | Performed by: FAMILY MEDICINE

## 2024-10-30 PROCEDURE — 20000000 HC ICU ROOM: Mod: HCNC

## 2024-10-30 PROCEDURE — 25000003 PHARM REV CODE 250: Mod: HCNC | Performed by: INTERNAL MEDICINE

## 2024-10-30 PROCEDURE — 25000003 PHARM REV CODE 250: Mod: HCNC | Performed by: FAMILY MEDICINE

## 2024-10-30 PROCEDURE — 84100 ASSAY OF PHOSPHORUS: CPT | Mod: HCNC | Performed by: INTERNAL MEDICINE

## 2024-10-30 PROCEDURE — 99900035 HC TECH TIME PER 15 MIN (STAT): Mod: HCNC

## 2024-10-30 PROCEDURE — 63600175 PHARM REV CODE 636 W HCPCS: Mod: HCNC | Performed by: FAMILY MEDICINE

## 2024-10-30 PROCEDURE — 27000207 HC ISOLATION: Mod: HCNC

## 2024-10-30 PROCEDURE — 83735 ASSAY OF MAGNESIUM: CPT | Mod: HCNC | Performed by: INTERNAL MEDICINE

## 2024-10-30 PROCEDURE — 80202 ASSAY OF VANCOMYCIN: CPT | Mod: HCNC | Performed by: INTERNAL MEDICINE

## 2024-10-30 PROCEDURE — 25000003 PHARM REV CODE 250: Mod: HCNC | Performed by: STUDENT IN AN ORGANIZED HEALTH CARE EDUCATION/TRAINING PROGRAM

## 2024-10-30 RX ORDER — HYDROCODONE BITARTRATE AND ACETAMINOPHEN 5; 325 MG/1; MG/1
1 TABLET ORAL EVERY 4 HOURS PRN
Status: DISCONTINUED | OUTPATIENT
Start: 2024-10-30 | End: 2024-10-31

## 2024-10-30 RX ORDER — OXYCODONE HYDROCHLORIDE 5 MG/1
5 TABLET ORAL ONCE AS NEEDED
Status: COMPLETED | OUTPATIENT
Start: 2024-10-30 | End: 2024-10-30

## 2024-10-30 RX ORDER — SODIUM CHLORIDE 9 MG/ML
INJECTION, SOLUTION INTRAVENOUS
Status: CANCELLED | OUTPATIENT
Start: 2024-10-30

## 2024-10-30 RX ORDER — SODIUM CHLORIDE 9 MG/ML
INJECTION, SOLUTION INTRAVENOUS ONCE
Status: CANCELLED | OUTPATIENT
Start: 2024-10-30 | End: 2024-10-30

## 2024-10-30 RX ADMIN — SERTRALINE HYDROCHLORIDE 100 MG: 100 TABLET ORAL at 08:10

## 2024-10-30 RX ADMIN — VANCOMYCIN HYDROCHLORIDE 500 MG: 500 INJECTION, POWDER, LYOPHILIZED, FOR SOLUTION INTRAVENOUS at 08:10

## 2024-10-30 RX ADMIN — MUPIROCIN: 20 OINTMENT TOPICAL at 08:10

## 2024-10-30 RX ADMIN — CLOPIDOGREL BISULFATE 75 MG: 75 TABLET ORAL at 08:10

## 2024-10-30 RX ADMIN — HEPARIN SODIUM 3600 UNITS: 1000 INJECTION, SOLUTION INTRAVENOUS; SUBCUTANEOUS at 04:10

## 2024-10-30 RX ADMIN — POTASSIUM & SODIUM PHOSPHATES POWDER PACK 280-160-250 MG 1 PACKET: 280-160-250 PACK at 08:10

## 2024-10-30 RX ADMIN — HYDROCODONE BITARTRATE AND ACETAMINOPHEN 1 TABLET: 10; 325 TABLET ORAL at 03:10

## 2024-10-30 RX ADMIN — PIPERACILLIN SODIUM AND TAZOBACTAM SODIUM 4.5 G: 4; .5 INJECTION, POWDER, LYOPHILIZED, FOR SOLUTION INTRAVENOUS at 04:10

## 2024-10-30 RX ADMIN — APIXABAN 5 MG: 5 TABLET, FILM COATED ORAL at 08:10

## 2024-10-30 RX ADMIN — INSULIN ASPART 2 UNITS: 100 INJECTION, SOLUTION INTRAVENOUS; SUBCUTANEOUS at 11:10

## 2024-10-30 RX ADMIN — ATORVASTATIN CALCIUM 80 MG: 40 TABLET, FILM COATED ORAL at 08:10

## 2024-10-30 RX ADMIN — HYDROCODONE BITARTRATE AND ACETAMINOPHEN 1 TABLET: 5; 325 TABLET ORAL at 05:10

## 2024-10-30 RX ADMIN — INSULIN GLARGINE 5 UNITS: 100 INJECTION, SOLUTION SUBCUTANEOUS at 10:10

## 2024-10-30 RX ADMIN — HYDROCODONE BITARTRATE AND ACETAMINOPHEN 1 TABLET: 10; 325 TABLET ORAL at 09:10

## 2024-10-30 RX ADMIN — OXYCODONE 5 MG: 5 TABLET ORAL at 05:10

## 2024-10-30 NOTE — PLAN OF CARE
Continues, on dialysis, sleepy, aroused to name, initially disoriented to her bday and year of birth, kept repeating October, October, finally able to tell me, same with present month and year, could not tell me the name of the hospital as earlier, and had a hard time, telling me why she was here,reoriented, then started crying, having pain,  but ciuld not tell me if something else bothering her,repositioned for now, dialysis almost over, then will give pain med,

## 2024-10-30 NOTE — EICU
eICU Physician Virtual/Remote Brief Evaluation Note      Message from bedside RN   Requesting COVID precautions as patient is COVID positive   Chart reviewed   COVID precautions ordered      JONN Almanzar MD  [facility] Attending  [phone]    This report has been created through the use of [employer] dictation software. Typographical and content errors may occur with this process. While efforts are made to detect and correct such errors, in some cases errors will persist. For this reason, wording in this document should be considered in the proper context and not strictly verbatim

## 2024-10-30 NOTE — PROGRESS NOTES
Pharmacokinetic Assessment Follow Up: IV Vancomycin    Vancomycin serum concentration assessment(s):    The random level was drawn correctly and can be used to guide therapy at this time. The measurement is above the desired definitive target range of 10 to 15 mcg/mL.    Vancomycin Regimen Plan:    Will give one time dose of vancomycin 500 mg post HD today and recheck vancomycin random with AM labs     Drug levels (last 3 results):  Recent Labs   Lab Result Units 10/29/24  0505 10/30/24  0538   Vancomycin, Random ug/mL 18.1 19.1       Pharmacy will continue to follow and monitor vancomycin.    Please contact pharmacy at extension [phone] for questions regarding this assessment.    Thank you for the consult,   Earnestine Kirby       Patient brief summary:  Suyapa Connelly is a 59 y.o. female initiated on antimicrobial therapy with IV Vancomycin for treatment of skin & soft tissue infection    The patient's current regimen is pulse dose    Drug Allergies:   Review of patient's allergies indicates:   Allergen Reactions    Ciprofloxacin Itching    Iodine      Kidney injury    Pcn [penicillins]      Rash; tolerated ceftriaxone on 1/13/20       Actual Body Weight:   68 kg    Renal Function:   Estimated Creatinine Clearance: 12.9 mL/min (A) (based on SCr of 3.5 mg/dL (H)).,     Dialysis Method (if applicable):  intermittent HD    CBC (last 72 hours):  Recent Labs   Lab Result Units 10/28/24  0529 10/29/24  0505 10/29/24  0856 10/30/24  0538   WBC K/uL 11.20 8.78 9.11 10.59   Hemoglobin g/dL 9.2* 8.9* 9.1* 9.7*   Hematocrit % 28.2* 29.0* 29.3* 31.0*   Platelets K/uL 257 237 253 263   Gran % % 79.1* 73.5* 68.7 72.4   Lymph % % 6.0* 6.5* 9.3* 5.6*   Mono % % 14.2 17.4* 19.2* 17.9*   Eosinophil % % 0.4 1.0 0.4 2.7   Basophil % % 0.3 0.5 0.4 0.5   Differential Method  Automated Automated Automated Automated       Metabolic Panel (last 72 hours):  Recent Labs   Lab Result Units 10/28/24  0529 10/29/24  0505 10/29/24  0856  10/29/24  1031 10/30/24  0538   Sodium mmol/L 135* 131* 133*  --  132*   Potassium mmol/L 3.4* 4.6 4.0  --  4.3   Chloride mmol/L 98 95 97  --  94*   CO2 mmol/L 26 20* 24  --  24   Glucose mg/dL 135* 167* 115*  --  101   BUN mg/dL 28* 43* 44*  --  27*   Creatinine mg/dL 4.0* 5.3* 5.5*  --  3.5*   Albumin g/dL 2.2* 2.1* 2.1*  --  2.0*   Total Bilirubin mg/dL 0.6 0.8 0.8  --  0.8   Alkaline Phosphatase U/L 447* 555* 530*  --  491*   AST U/L 76* 126* 137*  --  145*   ALT U/L 23 31 35  --  42   Magnesium mg/dL 1.8 2.0  --  1.8 1.8   Phosphorus mg/dL 2.3* 3.3  --  2.5* 3.4       Vancomycin Administrations:  vancomycin given in the last 96 hours                     vancomycin (VANCOCIN) 500 mg in D5W 100 mL IVPB (MB+) (mg) 500 mg New Bag 10/29/24 2018    vancomycin 1,250 mg in D5W 250 mL IVPB (admixture device) (mg) 1,250 mg New Bag 10/28/24 2239                    Microbiologic Results:  Microbiology Results (last 7 days)       Procedure Component Value Units Date/Time    Respiratory Infection Panel (PCR), Nasopharyngeal [[id number]]  (Abnormal) Collected: 10/29/24 1535    Order Status: Completed Specimen: Nasopharyngeal Swab Updated: 10/30/24 0048     Respiratory Infection Panel Source NP Swab     Adenovirus Not Detected     Coronavirus 229E, Common Cold Virus Not Detected     Coronavirus HKU1, Common Cold Virus Not Detected     Coronavirus NL63, Common Cold Virus Not Detected     Coronavirus OC43, Common Cold Virus Not Detected     Comment: The Coronavirus strains detected in this test cause the common cold.  These strains are not the COVID-19 (novel Coronavirus)strain   associated with the respiratory disease outbreak.          SARS-CoV2 (COVID-19) Qualitative PCR Detected     Human Metapneumovirus Not Detected     Human Rhinovirus/Enterovirus Not Detected     Influenza A (subtypes H1, H1-2009,H3) Not Detected     Influenza B Not Detected     Parainfluenza Virus 1 Not Detected     Parainfluenza Virus 2 Not Detected      Parainfluenza Virus 3 Not Detected     Parainfluenza Virus 4 Not Detected     Respiratory Syncytial Virus Not Detected     Bordetella Parapertussis (BL5472) Not Detected     Bordetella pertussis (ptxP) Not Detected     Chlamydia pneumoniae Not Detected     Mycoplasma pneumoniae Not Detected    Narrative:      Assay not valid for lower respiratory specimens, alternate  testing required.    Blood culture [[id number]] Collected: 10/29/24 0945    Order Status: Completed Specimen: Blood Updated: 10/29/24 2115     Blood Culture, Routine No Growth to date    Narrative:      Draw sample # 2 from separate site    Blood culture [[id number]] Collected: 10/29/24 0945    Order Status: Completed Specimen: Blood Updated: 10/29/24 2115     Blood Culture, Routine No Growth to date    Narrative:      Draw sample # 2 from separate site

## 2024-10-30 NOTE — PROGRESS NOTES
[facility] ID note    Patient afebrile since 9:00 a.m. yesterday.  WBC 10.6.  On vancomycin and piperacillin tazobactam.  Found to be COVID-19 positive.    No culture data other than COVID-19 test positive  Blood cultures negative so far  Abdominal ultrasound with gallbladder stones but no acute cholecystitis.  Hepatomegaly with steatosis.    Continue broad-spectrum antibiotics for left thigh infection  Monitor for need to treat COVID specifically    Please call if any questions   Juarez Gamboa  [facility] ID  [phone]

## 2024-10-30 NOTE — PLAN OF CARE
Pt ultrafiltrated today, 2000cc off, still with dependent edema, hips, thighs, able to wean 02 off, and while dialyzing sat's remained in the 93-96%, lung fields diminished, no sob, heart rhythm mainly at fib, with brief periods of sinus rhythm, with pac's, great deal of pain in left medial stump, hurts with slightest movement, pain med decreased by md due to resp rate 8-12, and lethargy, but pt having a great deal of pain when repositioned, inconsolable crying, medicAted as ordered,  surgery  saw pt today, no plans to debride wound, wound care as ordered, continue antibiotics, zosyn and vancomycin, remains in isolation for covid

## 2024-10-30 NOTE — NURSING
Central line dressing had date of 09/29/24, dressing changed with sterile technique and correct date of 10/30/24 labelled.

## 2024-10-30 NOTE — ASSESSMENT & PLAN NOTE
L AKA- necrotic amputation surgical site- developing eschar 9x15x0.2cm and blisters to periwound  Continue IV antibiotics   General surgery consulted for wound debridement. Recs: continue recommendations per Wound Care   - Aquacel AG daily to L AKA    ID recs:   Continue vancomycin/ piperacillin tazobactam  Given one dose of metronidazole.  Difficult to determine length of therapy at this time but related to treatment response. Unfortunately, the aspect of the ischemic lesions complicate assessment of recovery.  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  Patient is status post CABG as well as stents that have clotted in her lower extremities    Pain control-- continue current regimen as BP allows  Cautious with narcs secondary to bradypnea/ decreased respiratory rate

## 2024-10-30 NOTE — PT/OT/SLP PROGRESS
Occupational Therapy      Patient Name:  Suyapa Connelly   MRN:  [id number]    Attempts made 10:05 AM and 3:58 PM Patient not seen today secondary to Nurse/ AVA hold 2/2 Pain on first attempt, Dialysis upon second attempt. Will follow-up as able.    10/30/2024

## 2024-10-30 NOTE — PLAN OF CARE
Sat's will go down to 84%, not sustained, ,when alert, taking deeper breaths sat's up to 96% but then when she falls back to sleep, her resp rate goes down to 8-10, and sat's down, weaning 02 as goldie, going from off to 1lnc

## 2024-10-30 NOTE — PLAN OF CARE
Pain remains but pt has stopped crying , but still tearful, states the medicine is starting to work

## 2024-10-30 NOTE — PLAN OF CARE
Pt AAOx4, follows commands. Afebrile. HR stable, mildly hypertensive for a 4 hour period. BP now stable with no interventions. Intermittent afib on monitor. O2 sats stable on 2-3L NC. 1 small BM overnight. No UO, pt on HD. Labs reviewed, positive for COVID 19, precautions initiated. Safety maintained. POC reviewed with pt. Care ongoing.    Problem: Adult Inpatient Plan of Care  Goal: Plan of Care Review  Outcome: Progressing  Goal: Patient-Specific Goal (Individualized)  Outcome: Progressing  Goal: Absence of Hospital-Acquired Illness or Injury  Outcome: Progressing  Goal: Optimal Comfort and Wellbeing  Outcome: Progressing  Goal: Readiness for Transition of Care  Outcome: Progressing     Problem: Diabetes Comorbidity  Goal: Blood Glucose Level Within Targeted Range  Outcome: Progressing     Problem: Bariatric Environmental Safety  Goal: Safety Maintained with Care  Outcome: Progressing     Problem: Infection  Goal: Absence of Infection Signs and Symptoms  Outcome: Progressing     Problem: Wound  Goal: Optimal Coping  Outcome: Progressing  Goal: Optimal Functional Ability  Outcome: Progressing  Goal: Absence of Infection Signs and Symptoms  Outcome: Progressing  Goal: Improved Oral Intake  Outcome: Progressing  Goal: Optimal Pain Control and Function  Outcome: Progressing  Goal: Skin Health and Integrity  Outcome: Progressing  Goal: Optimal Wound Healing  Outcome: Progressing     Problem: Hemodialysis  Goal: Safe, Effective Therapy Delivery  Outcome: Progressing  Goal: Effective Tissue Perfusion  Outcome: Progressing  Goal: Absence of Infection Signs and Symptoms  Outcome: Progressing     Problem: Fall Injury Risk  Goal: Absence of Fall and Fall-Related Injury  Outcome: Progressing     Problem: Skin Injury Risk Increased  Goal: Skin Health and Integrity  Outcome: Progressing     Problem: Sepsis/Septic Shock  Goal: Optimal Coping  Outcome: Progressing

## 2024-10-30 NOTE — PLAN OF CARE
Dialysis completed, 2000cc off, pt 's positioned changed for comfort, but she started crying, sobbing, pain med given, but just inconsolable, will speak with md,

## 2024-10-30 NOTE — PLAN OF CARE
Pt stopped crying, but eyes closed, hand over them, medicated with oxycodone, 5mgm IR, sat's 93% room air

## 2024-10-30 NOTE — PLAN OF CARE
Patient on oxygen with documented flow.  Will attempt to wean per O2 order protocol. Pt on BIPAP with documented settings. Alarms are set and working. Will continue to monitor.     Path Notes (To The Dermatopathologist): Please check margins. Notch at 12:00.

## 2024-10-30 NOTE — PLAN OF CARE
Spoke with Dr Knox, critical care re her intense pain, her sobbing, unable to console , anything I  can give her , now, told pain med was decreased to hydrocodone 5 mgm q 4 hrs, will give one time dose of oxycontin

## 2024-10-30 NOTE — PROGRESS NOTES
[facility] - Intensive Care  General Surgery  Progress Note    Subjective:     History of Present Illness:  Suyapa Connelly is a 58yo F with pmhx of hypertension, hyperlipidemia, diabetes mellitus type 2, ESRD on HD, and PAD (s/p B/l AKA) who presented to the ED after being found on the floor by her neighbor. Patient reports that she got out of bed to change her pad and diaper but was unable to get back into bed and stayed on the floor throughout the evening. Per nursing, patient was altered and minimally responsive on admission to ICU but has become more alert since starting HD/ being put on bipap. Patient was noted to have some wound breakdown at the medial aspect of her L AKA incision and CT was obtained that showed subcutaneous fatty stranding of the left thigh and distal medial thigh subcutaneous emphysema. WBC and lactate wnl. General Surgery was consulted for further evaluation d/t concerns for nec fasc. On exam patient is more alert and answering questions appropriately. Wound breakdown at medial aspect of L AKA incision with some surrounding induration. No significant erythema, fluctuance or drainage noted. Patient does have pain on palpation of that area.     Post-Op Info:  * No surgery found *         Interval History: No major changes. Still has pain. No significant drainage.     Medications:  Continuous Infusions:  Scheduled Meds:   0.9% NaCl   Intravenous Once    apixaban  5 mg Oral BID    atorvastatin  80 mg Oral Daily    clopidogreL  75 mg Oral Daily    insulin glargine U-100  5 Units Subcutaneous QHS    mupirocin   Nasal BID    piperacillin-tazobactam (Zosyn) IV (PEDS and ADULTS) (extended infusion is not appropriate)  4.5 g Intravenous Q12H    sertraline  100 mg Oral Daily     PRN Meds:  Current Facility-Administered Medications:     0.9% NaCl, , Intravenous, PRN    acetaminophen, 650 mg, Oral, Q4H PRN    dextrose 10%, 12.5 g, Intravenous, PRN    dextrose 10%, 25 g, Intravenous, PRN    glucagon (human  recombinant), 1 mg, Intramuscular, PRN    glucose, 16 g, Oral, PRN    glucose, 24 g, Oral, PRN    heparin (porcine), 3,600 Units, Intravenous, PRN    HYDROcodone-acetaminophen, 1 tablet, Oral, Q8H PRN    HYDROcodone-acetaminophen, 1 tablet, Oral, Q6H PRN    insulin aspart U-100, 0-10 Units, Subcutaneous, QID (AC + HS) PRN    melatonin, 6 mg, Oral, Nightly PRN    naloxone, 0.02 mg, Intravenous, PRN    ondansetron, 4 mg, Intravenous, Q8H PRN    polyethylene glycol, 17 g, Oral, PRN    senna-docusate 8.6-50 mg, 1 tablet, Oral, BID PRN    sodium chloride 0.9%, 250 mL, Intravenous, PRN    sodium chloride 0.9%, 10 mL, Intravenous, Q8H PRN    Pharmacy to dose Vancomycin consult, , , Once **AND** vancomycin - pharmacy to dose, , Intravenous, pharmacy to manage frequency     Review of patient's allergies indicates:   Allergen Reactions    Ciprofloxacin Itching    Iodine      Kidney injury    Pcn [penicillins]      Rash; tolerated ceftriaxone on 1/13/20     Objective:     Vital Signs (Most Recent):  Temp: 98 °F (36.7 °C) (10/30/24 1115)  Pulse: 90 (10/30/24 1230)  Resp: 12 (10/30/24 1230)  BP: (!) 115/54 (10/30/24 1215)  SpO2: (!) 94 % (10/30/24 1230) Vital Signs (24h Range):  Temp:  [98 °F (36.7 °C)-98.7 °F (37.1 °C)] 98 °F (36.7 °C)  Pulse:  [74-92] 90  Resp:  [7-25] 12  SpO2:  [88 %-100 %] 94 %  BP: ()/() 115/54     Weight: 68 kg (150 lb)  Body mass index is 45.77 kg/m².    Intake/Output - Last 3 Shifts         10/28 0700  10/29 0659 10/29 0700  10/30 0659 10/30 0700  10/31 0659    P.O. 0  495    IV Piggyback  238.7 51.2    Total Intake(mL/kg) 0 (0) 238.7 (3.5) 546.2 (8)    Urine (mL/kg/hr) 0      Other  2500     Total Output 0 2500     Net 0 -2261.3 +546.2           Stool Occurrence  1 x 1 x             Physical Exam  Constitutional:       General: She is not in acute distress.     Appearance: Normal appearance.   HENT:      Head: Normocephalic and atraumatic.   Eyes:      Extraocular Movements: Extraocular  movements intact.   Cardiovascular:      Rate and Rhythm: Normal rate.   Pulmonary:      Effort: Pulmonary effort is normal. No respiratory distress.   Musculoskeletal:         General: Normal range of motion.      Comments: B/l AKA   Skin:     General: Skin is warm and dry.      Comments: Wound breakdown at medial aspect of L AKA. Some surrounding induration. No noted fluctuance or drainage   Neurological:      Mental Status: She is alert.          Significant Labs:  I have reviewed all pertinent lab results within the past 24 hours.    Significant Diagnostics:  I have reviewed all pertinent imaging results/findings within the past 24 hours.  Assessment/Plan:     S/P AKA (above knee amputation) bilateral  Suyapa Connelly is a 60yo F with b/l AKAs and wound breakdown at L AKA. General Surgery consulted for concern for nec fasc.     - No nec fasc. . Patient with appropriate pain in area surrounding wound. Area of minimal subcutaneous emphysema on CT scan located at area of wound breakdown suspect is due to breakdown/opening in that area. No noted organized fluid collection consistent with abscess. No purulent drainage or fluctuance.   - WBC wnl. Lactate wnl.   - Mental status improved without surgical intervention  - Continue recommendations per Wound Care  -She does have an eschar but no planned surgical intervention right now. Discussed with staff.   - Abx per primary team        Godwin Ramires MD  General Surgery  [facility] - Intensive Care

## 2024-10-30 NOTE — ASSESSMENT & PLAN NOTE
This patient had shock. The type of shock is distributive due to sepsis. She was admitted to the intensive care unit 10/29.   Patient did not require IVF resuscitation or pressors. BP recovered.  Stepped down 10/31.   Continue IV abx. For 14 days total  Can switch to po Augmentin and doxycyline on discharge

## 2024-10-30 NOTE — PT/OT/SLP PROGRESS
Physical Therapy  Visit Attempt    Patient Name:  Suyapa Connelly   MRN:  [id number]    Patient not seen today secondary to Nurse/ AVA hold, Pain, Dialysis. AM (1003) Attempt: nsg hold due to pain. PM (1525) attempt: pt in HD. Will follow-up tomorrow's date.    10/30/2024

## 2024-10-30 NOTE — PROGRESS NOTES
Patient seen.  Labs and vitals reviewed.  Pulse oximeter 94 % on 2 liters O2.  Plan dialysis / UF.

## 2024-10-30 NOTE — PROGRESS NOTES
[facility] Consult Note    Primary Attending Physician: Dr. Starkey  Consultant Attending: Dr. Crawford  Consultant Fellow: Dr. Martel    Subjective:      Interval Events:  Dialysis performed yesterday afternoon with 2.5L out. Improvement in mentation throughout day. Was able to be transitioned from Bipap to NC yesterday. This morning was placed on RA without issue. Patient tested positive for COVID infection. Single febrile episode yesterday, has been afebrile since.    Review of Systems:  All other systems are reviewed and are negative.     Objective:   Last 24 Hour Vital Signs:  BP  Min: 81/37  Max: 167/83  Temp  Av.7 °F (37.1 °C)  Min: 98.1 °F (36.7 °C)  Max: 101.6 °F (38.7 °C)  Pulse  Av.3  Min: 74  Max: 109  Resp  Av.5  Min: 7  Max: 23  SpO2  Av.8 %  Min: 85 %  Max: 100 %  Height  Av' (121.9 cm)  Min: 4' (121.9 cm)  Max: 4' (121.9 cm)  Weight  Av kg (150 lb)  Min: 68 kg (150 lb)  Max: 68 kg (150 lb)  I/O last 3 completed shifts:  In: 263.7 [IV Piggyback:263.7]  Out: 2500 [Other:2500]    Physical Examination:  General: resting comfortable in bed without distress  Cardiovascular: regular rate, irregular rhythm   Respiratory: breathing on RA, lungs on auscultation were clear but patient without giving good breaths to fully evaluate   Abdomen: soft, non-distended  Extremities: bilateral AKA, edema noted in thighs  Skin: large wound to medial thigh (image in media), sacral pressure wound  Neuro: A&Ox3 this morning, conversational, no FND    Laboratory:  Trended Lab Data:  Recent Labs     10/28/24  0529 10/29/24  0505 10/29/24  0847 10/29/24  0856 10/29/24  1030 10/29/24  1031 10/30/24  0538   WBC 11.20 8.78  --  9.11  --   --  10.59   HGB 9.2* 8.9*  --  9.1*  --   --  9.7*   HCT 28.2* 29.0* 27.9* 29.3*  --   --  31.0*    237  --  253  --   --  263   * 131*  --  133*  --   --   --    K 3.4* 4.6  --  4.0  --   --   --    CL 98 95  --  97  --    --   --    CO2 26 20*  --  24  --   --   --    BUN 28* 43*  --  44*  --   --   --    CREATININE 4.0* 5.3*  --  5.5*  --   --   --    * 167*  --  115*  --   --   --    BILITOT 0.6 0.8  --  0.8  --   --   --    AST 76* 126*  --  137*  --   --   --    ALT 23 31  --  35  --   --   --    ALKPHOS 447* 555*  --  530*  --   --   --    CALCIUM 8.0* 8.0*  --  8.3*  --   --   --    ALBUMIN 2.2* 2.1*  --  2.1*  --   --   --    PROT 7.8 7.8  --  7.4  --   --   --    MG 1.8 2.0  --   --   --  1.8  --    PHOS 2.3* 3.3  --   --   --  2.5*  --    INR  --   --   --   --  1.4*  --   --          Radiology:  I have personally reviewed the above labs and imaging.     Assessment:     Ms. Suyapa Connelly is a 59 year old female with a PMHx of ESRD on HD, T2DM, HTN, and HLD who presents to [facility] on 10/25 after being found down on floor by neighbor. On 10/29, MET was called as patient was unresponsive and hypoxic. Last known normal was stated to be the night prior. The cause of her AMS is likely medication induced. Fever likely from COVID infection. Lower concern for sepsis, but continuing broad Abx per ID recs for left thigh infection.     Plan:     Neurology  Acute Encephalopathy, resolved  - MET called due to AMS  - most likely thought to be medication induced as patient was started on gabapentin, and was on trazadone and cefepime.  These medications were discontinued  - could not rule out uremia in setting of ESRD, but unlikely as patient was on scheduled dialysis  - patient conversational and alert this morning    Cardiovascular  Hypotension   - initial concern for shock, but BP continues to improve  - Levo ordered but never administered. MAP goal >65    Heart Failure with Reduced Ejection Fraction  - previous ECHO May 2024 with EF 15% and severe global hypokinesis  - BNP 1478, trops 0.033  - received dialysis yesterday   - ordered repeat ECHO with improved EF  - discontinued Lasix as patient is anuric     Atrial  Fibrillation  - currently rate controlled, not rhythm controlled  - AC with Eliquis 5mg BID  - holding carvedilol in the setting of hypotension    CAD s/p stent  PVD  - CTA of LLE shows extensive plaque within the common, internal, and external iliac arteries and occlusion of the proximal left common iliac artery   - s/p bilateral AKA  - continue plavix and lipitor    Respiratory  Acute Hypoxic Respiratory Failure, resolved  - arrived to MET with spO2 at 84%  - ABG pH 7.38, pCO2 47, pO2 70, bicarb 28  - initially placed on Bipap, but was able to be weaned to RA    Gastrointestinal  Elevated ALP, AST, and GGT  Elevated Ammonia, resolved  - lipase and T bili wnl  - ordered RUQ ultrasound as unclear etiology to elevated labs, which showed hepatomegaly with hepatic steatosis and cholelithiasis   - elevated ammonia not likely high enough to be cause of AMS  - Hep B surface Ag reactive, but confirmatory test negative    Renal   ESRD  - on TTS dialysis, received dialysis yesterday with 2.5L out  - HD catheter in left subclavian  - patient is anuric     Hematology  Macrocytic Anemia  - at baseline    Endocrine  Hyperparathyroidism, secondary  - likely secondary to CKD    Type II Diabetes  - A1C 5.9%  - at goal glucose on SSI    TSH 3.3    Infectious Disease  Initial Concern for Sepsis  Cellulitis of Left Thigh  - febrile without leukocytosis   - left thigh wound present; also with pressure sacral wound (pictures in media)  - CT thigh with small amount of subcutaneous emphysema   - surgery consulted for concern of nec fasc, but states that is not nec fasc  - CXR without concern for pneumonia.  Unable to get UA due to anuric status.  - ID consulted.  States subcutaneous emphysema likely because it is open wound. Continue vancomycin and zosyn. Gave one dose of flagyl.   - blood cultures taken, NGTD  - wound care following     COVID-19 Infection  - likely etiology to fever  - currently on RA  - given patient is on RA with  improving condition, will not give remedesvir or steroids. If patient requires supplemental O2, can start these medications.      ICU Checklist  Feeding: renal diet  Analgesia: PRN Norco  Sedation: none  Thrombo PPX: Eliquis 5mg BID  Head of Bed: >30 degrees  Ulcer (Stress) PPX: not indicated  Glucose: at goal   SBT/SAT: N/A  Bowel Regimen: miralax and senna, BM overnight  Indwelling catheter/Lines/Invasive devices: left hemodialysis catheter, PIV  Abx: continue vanc and zosyn    CODE STATUS: FULL  DISPO: monitor blood pressures today in ICU, can step down tomorrow morning if stable      Magen Dear, DO  [facility] Internal Medicine, PGY-1

## 2024-10-30 NOTE — PROGRESS NOTES
[facility] Medicine  Progress Note    Patient Name: Suyapa Connelly  MRN: [id number]  Patient Class: IP- Inpatient   Admission Date: 10/28/2024  Length of Stay: 2 days  Attending Physician: Shae Starkey MD  Primary Care Provider: Vanessa Noel MD      Subjective:     Principal Problem:Cellulitis of left thigh      HPI:  Patient is a 59-year-old female with a known past medical history of hypertension, hyperlipidemia, diabetes mellitus type 2, ESRD on HD, and deconditioning that presents the ER after being on floor throughout the evening.     Patient presents after being found on the floor by a neighbor today.  Patient reports that she got out of bed to change her pad and diaper but was unable to get back into bed, staying on the floor throughout the evening.  Patient's  who normally cares for her, was arrested yesterday and is in residential and patient was unable to care for herself.  When patient was found by neighbor on floor, EMS was called and patient was brought to the ER for further evaluation.  On arrival to ER, patient in no acute distress.  Labs essentially within normal limits.  Patient last dialyzed on Thursday and no signs of fluid overload or electrolyte abnormalities.  Patient has no family NS able to assist her at home other than her  who is currently incarcerated.  Patient admitted for social problems and hemodialysis.    Patient due to her open wounds and CT finding of her left thigh - emphysema can not rule out infection -     Overview/Hospital Course:  No notes on file    Interval History: NAEON. No acute concerns.     Review of Systems   Constitutional:  Negative for chills and fever.   Respiratory:  Negative for shortness of breath.    Cardiovascular:  Negative for chest pain.   Gastrointestinal:  Negative for abdominal pain.   Genitourinary:  Negative for dysuria.   Neurological:  Negative for headaches.   Psychiatric/Behavioral:  Negative for  confusion.      Objective:     Vital Signs (Most Recent):  Temp: 98 °F (36.7 °C) (10/30/24 1115)  Pulse: 84 (10/30/24 1445)  Resp: 12 (10/30/24 1445)  BP: (!) 101/46 (10/30/24 1445)  SpO2: (!) 92 % (10/30/24 1445) Vital Signs (24h Range):  Temp:  [98 °F (36.7 °C)-98.7 °F (37.1 °C)] 98 °F (36.7 °C)  Pulse:  [74-92] 84  Resp:  [7-25] 12  SpO2:  [86 %-100 %] 92 %  BP: ()/() 101/46     Weight: 68 kg (150 lb)  Body mass index is 45.77 kg/m².    Intake/Output Summary (Last 24 hours) at 10/30/2024 1538  Last data filed at 10/30/2024 1215  Gross per 24 hour   Intake 784.91 ml   Output --   Net 784.91 ml         Physical Exam  Vitals and nursing note reviewed.   Constitutional:       General: She is not in acute distress.     Appearance: She is well-developed. She is ill-appearing (chronically).   HENT:      Head: Normocephalic and atraumatic.   Eyes:      Conjunctiva/sclera: Conjunctivae normal.   Neck:      Vascular: No JVD.   Cardiovascular:      Rate and Rhythm: Normal rate and regular rhythm.      Heart sounds: Normal heart sounds.   Pulmonary:      Effort: Pulmonary effort is normal. Bradypnea present.      Breath sounds: Normal breath sounds.   Abdominal:      General: Bowel sounds are normal. There is no distension.      Palpations: Abdomen is soft.      Tenderness: There is no abdominal tenderness.   Musculoskeletal:      Cervical back: Neck supple.      Right lower leg: No edema.      Left lower leg: No edema.      Comments: B/L AKA  R stump swelling, L stump with gauze    Neurological:      Mental Status: She is easily aroused. She is lethargic.   Psychiatric:         Behavior: Behavior normal.             Significant Labs: All pertinent labs within the past 24 hours have been reviewed.  CBC:   Recent Labs   Lab 10/29/24  0505 10/29/24  0847 10/29/24  0856 10/30/24  0538   WBC 8.78  --  9.11 10.59   HGB 8.9*  --  9.1* 9.7*   HCT 29.0* 27.9* 29.3* 31.0*     --  253 263     CMP:   Recent Labs    Lab 10/29/24  0505 10/29/24  0856 10/30/24  0538   * 133* 132*   K 4.6 4.0 4.3   CL 95 97 94*   CO2 20* 24 24   * 115* 101   BUN 43* 44* 27*   CREATININE 5.3* 5.5* 3.5*   CALCIUM 8.0* 8.3* 8.4*   PROT 7.8 7.4 7.5   ALBUMIN 2.1* 2.1* 2.0*   BILITOT 0.8 0.8 0.8   ALKPHOS 555* 530* 491*   * 137* 145*   ALT 31 35 42   ANIONGAP 16 12 14       Significant Imaging: I have reviewed all pertinent imaging results/findings within the past 24 hours.    Assessment/Plan:      * Cellulitis of left thigh  L AKA- necrotic amputation surgical site- developing eschar 9x15x0.2cm and blisters to periwound  Continue IV antibiotics   General surgery consulted for wound debridement. Recs: continue recommendations per Wound Care   - Aquacel AG daily to L AKA    Patient would benefit from a debridement      ID recs:   Continue vancomycin  Start piperacillin tazobactam  Will give one dose of metronidazole.  Low threshold for surgical debridement and cultures to be sent    Pressure injury of sacral region, unstageable  Wound care    - Pressure injury prevention interventions   - Triad ointment BID to sacrum/buttocks      Morbid obesity with BMI of 50.0-59.9, adult  Body mass index is 45.77 kg/m². Morbid obesity complicates all aspects of disease management from diagnostic modalities to treatment.           S/P AKA (above knee amputation) bilateral  Fall precautions     Septic shock  This patient has shock. The type of shock is distributive due to sepsis. She was admitted to the intensive care unit 10/29.   Continue IV abx.    Patient did not require IVF resuscitation or pressors. BP recovered, but still soft at times.     Chronic combined systolic and diastolic congestive heart failure  Patient has Combined Systolic and Diastolic heart failure that is Chronic. On presentation their CHF was well compensated. Most recent BNP and echo results are listed below.  Recent Labs     10/29/24  0853   BNP 1,478*     Latest  ECHO  Results for orders placed during the hospital encounter of 05/07/24    Echo    Interpretation Summary    Left Ventricle: The left ventricle is mildly dilated. Ventricular mass is normal. Normal wall thickness. Severe global hypokinesis present. There is severely reduced systolic function. Ejection fraction by visual approximation is 15%. There is diastolic dysfunction.    Right Ventricle: Severe right ventricular enlargement. Wall thickness is normal. Right ventricle wall motion has global hypokinesis. Systolic function is mildly reduced.    Left Atrium: Left atrium is severely dilated. There is an atrial septal aneurysm.    Right Atrium: Right atrium is dilated.    Aortic Valve: There is mild aortic regurgitation.    Mitral Valve: There is mild regurgitation.    Tricuspid Valve: There is annular dilation present. There is normal leaflet mobility. There is severe functional regurgitation.    Pulmonary Artery: The estimated pulmonary artery systolic pressure is at least 24 mmHg. PASP is likely under-estimated in the setting of severe tricuspid regurgitation.    IVC/SVC: Central venous pressure of at least 8 mmHg.    No vegetation seen.    Current Heart Failure Medications       Plan  - Monitor strict I&Os and daily weights.    - Place on telemetry  - Low sodium diet  - Place on fluid restriction of 1 L.   - Cardiology has not been consulted  - The patient's volume status is stable but not at their baseline as indicated by edema- UF with HD as tolerated. Lasix DC'd due to low BP        Peripheral vascular disease  On DAPT         VTE Risk Mitigation (From admission, onward)           Ordered     apixaban tablet 5 mg  2 times daily         10/28/24 2114     IP VTE HIGH RISK PATIENT  Once         10/28/24 2114     Place sequential compression device  Until discontinued         10/28/24 2114     Reason for No Pharmacological VTE Prophylaxis  Once        Question:  Reasons:  Answer:  Already adequately  anticoagulated on oral Anticoagulants    10/28/24 2114     heparin (porcine) injection 3,600 Units  As needed (PRN)         10/28/24 2114                    Discharge Planning   DEVAN: 11/1/2024     Code Status: Full Code   Is the patient medically ready for discharge?:     Reason for patient still in hospital (select all that apply): Patient trending condition, Treatment, and Consult recommendations  Discharge Plan A: Skilled Nursing Facility          Critical care time spent on the evaluation and treatment of severe organ dysfunction, review of pertinent labs and imaging studies, discussions with consulting providers and discussions with patient/family: 31 minutes.      Shae Starkey MD  Department of Hospital Medicine   [facility] Intensive [facility]

## 2024-10-30 NOTE — SUBJECTIVE & OBJECTIVE
Interval History: No major changes. Still has pain. No significant drainage.     Medications:  Continuous Infusions:  Scheduled Meds:   0.9% NaCl   Intravenous Once    apixaban  5 mg Oral BID    atorvastatin  80 mg Oral Daily    clopidogreL  75 mg Oral Daily    insulin glargine U-100  5 Units Subcutaneous QHS    mupirocin   Nasal BID    piperacillin-tazobactam (Zosyn) IV (PEDS and ADULTS) (extended infusion is not appropriate)  4.5 g Intravenous Q12H    sertraline  100 mg Oral Daily     PRN Meds:  Current Facility-Administered Medications:     0.9% NaCl, , Intravenous, PRN    acetaminophen, 650 mg, Oral, Q4H PRN    dextrose 10%, 12.5 g, Intravenous, PRN    dextrose 10%, 25 g, Intravenous, PRN    glucagon (human recombinant), 1 mg, Intramuscular, PRN    glucose, 16 g, Oral, PRN    glucose, 24 g, Oral, PRN    heparin (porcine), 3,600 Units, Intravenous, PRN    HYDROcodone-acetaminophen, 1 tablet, Oral, Q8H PRN    HYDROcodone-acetaminophen, 1 tablet, Oral, Q6H PRN    insulin aspart U-100, 0-10 Units, Subcutaneous, QID (AC + HS) PRN    melatonin, 6 mg, Oral, Nightly PRN    naloxone, 0.02 mg, Intravenous, PRN    ondansetron, 4 mg, Intravenous, Q8H PRN    polyethylene glycol, 17 g, Oral, PRN    senna-docusate 8.6-50 mg, 1 tablet, Oral, BID PRN    sodium chloride 0.9%, 250 mL, Intravenous, PRN    sodium chloride 0.9%, 10 mL, Intravenous, Q8H PRN    Pharmacy to dose Vancomycin consult, , , Once **AND** vancomycin - pharmacy to dose, , Intravenous, pharmacy to manage frequency     Review of patient's allergies indicates:   Allergen Reactions    Ciprofloxacin Itching    Iodine      Kidney injury    Pcn [penicillins]      Rash; tolerated ceftriaxone on 1/13/20     Objective:     Vital Signs (Most Recent):  Temp: 98 °F (36.7 °C) (10/30/24 1115)  Pulse: 90 (10/30/24 1230)  Resp: 12 (10/30/24 1230)  BP: (!) 115/54 (10/30/24 1215)  SpO2: (!) 94 % (10/30/24 1230) Vital Signs (24h Range):  Temp:  [98 °F (36.7 °C)-98.7 °F (37.1 °C)]  98 °F (36.7 °C)  Pulse:  [74-92] 90  Resp:  [7-25] 12  SpO2:  [88 %-100 %] 94 %  BP: ()/() 115/54     Weight: 68 kg (150 lb)  Body mass index is 45.77 kg/m².    Intake/Output - Last 3 Shifts         10/28 0700  10/29 0659 10/29 0700  10/30 0659 10/30 0700  10/31 0659    P.O. 0  495    IV Piggyback  238.7 51.2    Total Intake(mL/kg) 0 (0) 238.7 (3.5) 546.2 (8)    Urine (mL/kg/hr) 0      Other  2500     Total Output 0 2500     Net 0 -2261.3 +546.2           Stool Occurrence  1 x 1 x             Physical Exam  Constitutional:       General: She is not in acute distress.     Appearance: Normal appearance.   HENT:      Head: Normocephalic and atraumatic.   Eyes:      Extraocular Movements: Extraocular movements intact.   Cardiovascular:      Rate and Rhythm: Normal rate.   Pulmonary:      Effort: Pulmonary effort is normal. No respiratory distress.   Musculoskeletal:         General: Normal range of motion.      Comments: B/l AKA   Skin:     General: Skin is warm and dry.      Comments: Wound breakdown at medial aspect of L AKA. Some surrounding induration. No noted fluctuance or drainage   Neurological:      Mental Status: She is alert.          Significant Labs:  I have reviewed all pertinent lab results within the past 24 hours.    Significant Diagnostics:  I have reviewed all pertinent imaging results/findings within the past 24 hours.

## 2024-10-30 NOTE — PLAN OF CARE
Wound care here, wound care done, very painful, not time for pain med, will see if it can be adjusted, would mid to medial aspect of stump, soft eschar surrounded by pink tissue, drainage green, black, [facility] wound care nurse would like it debrided so wound vac can be placed, asked me to relay this if surgery comes by

## 2024-10-30 NOTE — PLAN OF CARE
Pt awake , but seems sleepy, oriented, but needed some coaxing , seems to have trouble remembering some events, and sequence of events, knew she fell, but could not tell me if the wound on her left stump was new or not, denies pain , until I slightly touched the left thigh, and she yelled out, also co of pain in rt ant  lower thigh, dressing to left stump with some drainage, no redness noted on left and rt ant stump, some edema, edema noted hips, and lower  back   felt  hard area lower lateral left breast,  Ap 88 irregular, at fib, denies chest pain, bp lower end 90's, resp shallow, slow, 10-12, encouraged deeper breaths, lungs diminished, remains 2lnc,   Pt states has poor eyesight, rt pupil sluggishly reacts to light and slight difference in size , uses glasses , not here,  has bad arthritis rt hand ,fingers, fingers bent, also in left hand to a lower extent        Repositioned for breakfast, fed herself initially but then couldn't , so I finished feeding her

## 2024-10-30 NOTE — ASSESSMENT & PLAN NOTE
Suyapa Connelly is a 60yo F with b/l AKAs and wound breakdown at L AKA. General Surgery consulted for concern for nec fasc.     - No nec fasc. . Patient with appropriate pain in area surrounding wound. Area of minimal subcutaneous emphysema on CT scan located at area of wound breakdown suspect is due to breakdown/opening in that area. No noted organized fluid collection consistent with abscess. No purulent drainage or fluctuance.   - WBC wnl. Lactate wnl.   - Mental status improved without surgical intervention  - Continue recommendations per Wound Care  -She does have an eschar but no planned surgical intervention right now. Discussed with staff.   - Abx per primary team

## 2024-10-30 NOTE — SUBJECTIVE & OBJECTIVE
Interval History:     Patient was very tearful abiut having the rectal tube  I reassured her and explained the reason why she needs it and it would be better than having a skin break , she calmed down.  was at bedside and tried calling her mother but she didn't     Patient has dark green watery stool, no foul smelling.        Review of Systems   Constitutional: Negative.    HENT: Negative.     Respiratory: Negative.     Cardiovascular: Negative.    Gastrointestinal: Negative.    Genitourinary: Negative.    Musculoskeletal:  Positive for myalgias.   Skin: Negative.    Neurological: Negative.    Psychiatric/Behavioral:  Positive for dysphoric mood.      Objective:     Vital Signs (Most Recent):  Temp: 98.5 °F (36.9 °C) (11/04/24 1245)  Pulse: 83 (11/04/24 1300)  Resp: 10 (11/04/24 1300)  BP: (!) 122/58 (11/04/24 1300)  SpO2: 96 % (11/04/24 1300) Vital Signs (24h Range):  Temp:  [98 °F (36.7 °C)-98.5 °F (36.9 °C)] 98.5 °F (36.9 °C)  Pulse:  [74-87] 83  Resp:  [10-20] 10  SpO2:  [95 %-100 %] 96 %  BP: ()/(45-74) 122/58     Weight: 68 kg (150 lb)  Body mass index is 45.77 kg/m².    Intake/Output Summary (Last 24 hours) at 11/4/2024 1448  Last data filed at 11/4/2024 1245  Gross per 24 hour   Intake 765.23 ml   Output 2500 ml   Net -1734.77 ml         Physical Exam  Constitutional:       Appearance: She is ill-appearing.   HENT:      Head: Normocephalic and atraumatic.      Mouth/Throat:      Mouth: Mucous membranes are moist.   Cardiovascular:      Rate and Rhythm: Normal rate and regular rhythm.   Pulmonary:      Effort: Pulmonary effort is normal. No respiratory distress.      Breath sounds: Normal breath sounds.   Abdominal:      General: Abdomen is flat.      Palpations: Abdomen is soft.   Musculoskeletal:      Comments: Bilateral AKA  Left AKA stump covered with clean dressing   Skin:     General: Skin is warm and dry.   Neurological:      General: No focal deficit present.      Mental  Status: She is alert and oriented to person, place, and time.             Significant Labs: All pertinent labs within the past 24 hours have been reviewed.  CBC:   Recent Labs   Lab 11/03/24  1024 11/03/24  1432 11/04/24  0521   WBC 15.75* 24.77* 15.20*   HGB 7.3* 7.8* 8.0*   HCT 21.5* 23.0* 25.8*    284 290     CMP:   Recent Labs   Lab 11/03/24  0553 11/04/24  0822   * 132*   K 4.4 4.8   CL 97 97   CO2 25 18*   * 168*   BUN 30* 48*   CREATININE 3.3* 4.2*   CALCIUM 7.5* 7.8*   ALBUMIN 1.6* 1.8*   ANIONGAP 11 17*       Significant Imaging:  no new

## 2024-10-30 NOTE — PLAN OF CARE
Critical care team here, told of present status, lower bp earlier but now up , same thing happened on nights, bp went up for awhile then back down into the 90's, told of edema, and possible dialysis today, lasix to be discontinued, also told that resp shallow and rate will go down to 8-12    02 off,

## 2024-10-30 NOTE — PLAN OF CARE
Surgeon here, told him of what [facility] wound care nurse, recommended, [facility] called and she came, both looked at wound, she feels worse than yesterday , picture taken,   and he will show and talk with Dr Tai,

## 2024-10-31 LAB
ALBUMIN SERPL BCP-MCNC: 2 G/DL (ref 3.5–5.2)
ALP SERPL-CCNC: 568 U/L (ref 40–150)
ALT SERPL W/O P-5'-P-CCNC: 37 U/L (ref 10–44)
ANION GAP SERPL CALC-SCNC: 11 MMOL/L (ref 8–16)
AST SERPL-CCNC: 112 U/L (ref 10–40)
BASOPHILS # BLD AUTO: 0.04 K/UL (ref 0–0.2)
BASOPHILS NFR BLD: 0.4 % (ref 0–1.9)
BILIRUB SERPL-MCNC: 0.8 MG/DL (ref 0.1–1)
BUN SERPL-MCNC: 19 MG/DL (ref 6–20)
CALCIUM SERPL-MCNC: 8.7 MG/DL (ref 8.7–10.5)
CHLORIDE SERPL-SCNC: 93 MMOL/L (ref 95–110)
CO2 SERPL-SCNC: 27 MMOL/L (ref 23–29)
CREAT SERPL-MCNC: 2.9 MG/DL (ref 0.5–1.4)
DIFFERENTIAL METHOD BLD: ABNORMAL
EOSINOPHIL # BLD AUTO: 0.2 K/UL (ref 0–0.5)
EOSINOPHIL NFR BLD: 2.4 % (ref 0–8)
ERYTHROCYTE [DISTWIDTH] IN BLOOD BY AUTOMATED COUNT: 18.4 % (ref 11.5–14.5)
EST. GFR  (NO RACE VARIABLE): 18 ML/MIN/1.73 M^2
GLUCOSE SERPL-MCNC: 112 MG/DL (ref 70–110)
HCT VFR BLD AUTO: 29.7 % (ref 37–48.5)
HGB BLD-MCNC: 9.5 G/DL (ref 12–16)
IMM GRANULOCYTES # BLD AUTO: 0.05 K/UL (ref 0–0.04)
IMM GRANULOCYTES NFR BLD AUTO: 0.5 % (ref 0–0.5)
LYMPHOCYTES # BLD AUTO: 0.6 K/UL (ref 1–4.8)
LYMPHOCYTES NFR BLD: 6.7 % (ref 18–48)
MAGNESIUM SERPL-MCNC: 1.9 MG/DL (ref 1.6–2.6)
MCH RBC QN AUTO: 33.3 PG (ref 27–31)
MCHC RBC AUTO-ENTMCNC: 32 G/DL (ref 32–36)
MCV RBC AUTO: 104 FL (ref 82–98)
MONOCYTES # BLD AUTO: 1.7 K/UL (ref 0.3–1)
MONOCYTES NFR BLD: 18.8 % (ref 4–15)
NEUTROPHILS # BLD AUTO: 6.5 K/UL (ref 1.8–7.7)
NEUTROPHILS NFR BLD: 71.2 % (ref 38–73)
NRBC BLD-RTO: 0 /100 WBC
PHOSPHATE SERPL-MCNC: 3.1 MG/DL (ref 2.7–4.5)
PLATELET # BLD AUTO: 267 K/UL (ref 150–450)
PMV BLD AUTO: 12.1 FL (ref 9.2–12.9)
POCT GLUCOSE: 111 MG/DL (ref 70–110)
POCT GLUCOSE: 182 MG/DL (ref 70–110)
POCT GLUCOSE: 186 MG/DL (ref 70–110)
POCT GLUCOSE: 218 MG/DL (ref 70–110)
POTASSIUM SERPL-SCNC: 4.2 MMOL/L (ref 3.5–5.1)
PROT SERPL-MCNC: 7.8 G/DL (ref 6–8.4)
RBC # BLD AUTO: 2.85 M/UL (ref 4–5.4)
SODIUM SERPL-SCNC: 131 MMOL/L (ref 136–145)
VANCOMYCIN SERPL-MCNC: 22.5 UG/ML
WBC # BLD AUTO: 9.17 K/UL (ref 3.9–12.7)

## 2024-10-31 PROCEDURE — 25000003 PHARM REV CODE 250: Mod: HCNC | Performed by: INTERNAL MEDICINE

## 2024-10-31 PROCEDURE — 36415 COLL VENOUS BLD VENIPUNCTURE: CPT | Mod: HCNC | Performed by: FAMILY MEDICINE

## 2024-10-31 PROCEDURE — 83735 ASSAY OF MAGNESIUM: CPT | Mod: HCNC | Performed by: INTERNAL MEDICINE

## 2024-10-31 PROCEDURE — 27000221 HC OXYGEN, UP TO 24 HOURS: Mod: HCNC

## 2024-10-31 PROCEDURE — 63600175 PHARM REV CODE 636 W HCPCS: Mod: HCNC | Performed by: INTERNAL MEDICINE

## 2024-10-31 PROCEDURE — 25000003 PHARM REV CODE 250: Mod: HCNC

## 2024-10-31 PROCEDURE — 25000003 PHARM REV CODE 250: Mod: HCNC | Performed by: FAMILY MEDICINE

## 2024-10-31 PROCEDURE — 11000001 HC ACUTE MED/SURG PRIVATE ROOM: Mod: HCNC

## 2024-10-31 PROCEDURE — 99900035 HC TECH TIME PER 15 MIN (STAT): Mod: HCNC

## 2024-10-31 PROCEDURE — 80202 ASSAY OF VANCOMYCIN: CPT | Mod: HCNC | Performed by: FAMILY MEDICINE

## 2024-10-31 PROCEDURE — 80100014 HC HEMODIALYSIS 1:1: Mod: HCNC

## 2024-10-31 PROCEDURE — 85025 COMPLETE CBC W/AUTO DIFF WBC: CPT | Mod: HCNC | Performed by: INTERNAL MEDICINE

## 2024-10-31 PROCEDURE — 63600175 PHARM REV CODE 636 W HCPCS: Mod: HCNC | Performed by: FAMILY MEDICINE

## 2024-10-31 PROCEDURE — 27000207 HC ISOLATION: Mod: HCNC

## 2024-10-31 PROCEDURE — 94761 N-INVAS EAR/PLS OXIMETRY MLT: CPT | Mod: HCNC

## 2024-10-31 PROCEDURE — 80053 COMPREHEN METABOLIC PANEL: CPT | Mod: HCNC | Performed by: INTERNAL MEDICINE

## 2024-10-31 PROCEDURE — 84100 ASSAY OF PHOSPHORUS: CPT | Mod: HCNC | Performed by: INTERNAL MEDICINE

## 2024-10-31 RX ORDER — OXYBUTYNIN CHLORIDE 5 MG/1
5 TABLET, EXTENDED RELEASE ORAL DAILY
Status: DISCONTINUED | OUTPATIENT
Start: 2024-10-31 | End: 2024-10-31

## 2024-10-31 RX ORDER — PREGABALIN 50 MG/1
50 CAPSULE ORAL DAILY
Status: DISCONTINUED | OUTPATIENT
Start: 2024-10-31 | End: 2024-11-10

## 2024-10-31 RX ORDER — OXYCODONE HCL 10 MG/1
10 TABLET, FILM COATED, EXTENDED RELEASE ORAL EVERY 12 HOURS PRN
Status: DISCONTINUED | OUTPATIENT
Start: 2024-10-31 | End: 2024-11-12 | Stop reason: HOSPADM

## 2024-10-31 RX ADMIN — INSULIN ASPART 4 UNITS: 100 INJECTION, SOLUTION INTRAVENOUS; SUBCUTANEOUS at 05:10

## 2024-10-31 RX ADMIN — SERTRALINE HYDROCHLORIDE 100 MG: 100 TABLET ORAL at 08:10

## 2024-10-31 RX ADMIN — HYDROCODONE BITARTRATE AND ACETAMINOPHEN 1 TABLET: 5; 325 TABLET ORAL at 04:10

## 2024-10-31 RX ADMIN — HYDROCODONE BITARTRATE AND ACETAMINOPHEN 1 TABLET: 5; 325 TABLET ORAL at 08:10

## 2024-10-31 RX ADMIN — CLOPIDOGREL BISULFATE 75 MG: 75 TABLET ORAL at 08:10

## 2024-10-31 RX ADMIN — INSULIN GLARGINE 5 UNITS: 100 INJECTION, SOLUTION SUBCUTANEOUS at 09:10

## 2024-10-31 RX ADMIN — PIPERACILLIN SODIUM AND TAZOBACTAM SODIUM 4.5 G: 4; .5 INJECTION, POWDER, LYOPHILIZED, FOR SOLUTION INTRAVENOUS at 04:10

## 2024-10-31 RX ADMIN — MUPIROCIN: 20 OINTMENT TOPICAL at 09:10

## 2024-10-31 RX ADMIN — INSULIN ASPART 2 UNITS: 100 INJECTION, SOLUTION INTRAVENOUS; SUBCUTANEOUS at 12:10

## 2024-10-31 RX ADMIN — APIXABAN 5 MG: 5 TABLET, FILM COATED ORAL at 09:10

## 2024-10-31 RX ADMIN — INSULIN ASPART 1 UNITS: 100 INJECTION, SOLUTION INTRAVENOUS; SUBCUTANEOUS at 09:10

## 2024-10-31 RX ADMIN — HEPARIN SODIUM 3600 UNITS: 1000 INJECTION, SOLUTION INTRAVENOUS; SUBCUTANEOUS at 11:10

## 2024-10-31 RX ADMIN — ATORVASTATIN CALCIUM 80 MG: 40 TABLET, FILM COATED ORAL at 08:10

## 2024-10-31 RX ADMIN — OXYCODONE HYDROCHLORIDE 10 MG: 10 TABLET, FILM COATED, EXTENDED RELEASE ORAL at 11:10

## 2024-10-31 RX ADMIN — MUPIROCIN: 20 OINTMENT TOPICAL at 08:10

## 2024-10-31 RX ADMIN — APIXABAN 5 MG: 5 TABLET, FILM COATED ORAL at 08:10

## 2024-10-31 RX ADMIN — PREGABALIN 50 MG: 50 CAPSULE ORAL at 03:10

## 2024-10-31 RX ADMIN — VANCOMYCIN HYDROCHLORIDE 500 MG: 500 INJECTION, POWDER, LYOPHILIZED, FOR SOLUTION INTRAVENOUS at 01:10

## 2024-10-31 NOTE — PROGRESS NOTES
Pharmacokinetic Assessment Follow Up: IV Vancomycin    Vancomycin serum concentration assessment(s):    The random level was drawn correctly and can be used to guide therapy at this time. The measurement is above the desired definitive target range of 10 to 15 mcg/mL.    Vancomycin Regimen Plan:    Will give one time dose of vancomycin post HD today and recheck vancomycin random with AM Labs on 11/1. Will follow up HD plan daily.    Drug levels (last 3 results):  Recent Labs   Lab Result Units 10/29/24  0505 10/30/24  0538 10/31/24  0536   Vancomycin, Random ug/mL 18.1 19.1 22.5       Pharmacy will continue to follow and monitor vancomycin.    Please contact pharmacy at extension [phone] for questions regarding this assessment.    Thank you for the consult,   Earnestine Kirby       Patient brief summary:  Suyapa Connelly is a 59 y.o. female initiated on antimicrobial therapy with IV Vancomycin for treatment of skin & soft tissue infection    The patient's current regimen is pulse dose    Drug Allergies:   Review of patient's allergies indicates:   Allergen Reactions    Ciprofloxacin Itching    Iodine      Kidney injury    Pcn [penicillins]      Rash; tolerated ceftriaxone on 1/13/20       Actual Body Weight:   68 kg    Renal Function:   Estimated Creatinine Clearance: 15.6 mL/min (A) (based on SCr of 2.9 mg/dL (H)).,     Dialysis Method (if applicable):  intermittent HD    CBC (last 72 hours):  Recent Labs   Lab Result Units 10/29/24  0505 10/29/24  0856 10/30/24  0538 10/31/24  0536   WBC K/uL 8.78 9.11 10.59 9.17   Hemoglobin g/dL 8.9* 9.1* 9.7* 9.5*   Hematocrit % 29.0* 29.3* 31.0* 29.7*   Platelets K/uL 237 253 263 267   Gran % % 73.5* 68.7 72.4 71.2   Lymph % % 6.5* 9.3* 5.6* 6.7*   Mono % % 17.4* 19.2* 17.9* 18.8*   Eosinophil % % 1.0 0.4 2.7 2.4   Basophil % % 0.5 0.4 0.5 0.4   Differential Method  Automated Automated Automated Automated       Metabolic Panel (last 72 hours):  Recent Labs   Lab Result Units  10/29/24  0505 10/29/24  0856 10/29/24  1031 10/30/24  0538 10/31/24  0536   Sodium mmol/L 131* 133*  --  132* 131*   Potassium mmol/L 4.6 4.0  --  4.3 4.2   Chloride mmol/L 95 97  --  94* 93*   CO2 mmol/L 20* 24  --  24 27   Glucose mg/dL 167* 115*  --  101 112*   BUN mg/dL 43* 44*  --  27* 19   Creatinine mg/dL 5.3* 5.5*  --  3.5* 2.9*   Albumin g/dL 2.1* 2.1*  --  2.0* 2.0*   Total Bilirubin mg/dL 0.8 0.8  --  0.8 0.8   Alkaline Phosphatase U/L 555* 530*  --  491* 568*   AST U/L 126* 137*  --  145* 112*   ALT U/L 31 35  --  42 37   Magnesium mg/dL 2.0  --  1.8 1.8 1.9   Phosphorus mg/dL 3.3  --  2.5* 3.4 3.1       Vancomycin Administrations:  vancomycin given in the last 96 hours                     vancomycin (VANCOCIN) 500 mg in D5W 100 mL IVPB (MB+) (mg) 500 mg New Bag 10/30/24 2008    vancomycin (VANCOCIN) 500 mg in D5W 100 mL IVPB (MB+) (mg) 500 mg New Bag 10/29/24 2018    vancomycin 1,250 mg in D5W 250 mL IVPB (admixture device) (mg) 1,250 mg New Bag 10/28/24 2239                    Microbiologic Results:  Microbiology Results (last 7 days)       Procedure Component Value Units Date/Time    Blood culture [[id number]] Collected: 10/29/24 0945    Order Status: Completed Specimen: Blood Updated: 10/30/24 1612     Blood Culture, Routine No Growth to date      No Growth to date    Narrative:      Draw sample # 2 from separate site    Blood culture [[id number]] Collected: 10/29/24 0945    Order Status: Completed Specimen: Blood Updated: 10/30/24 1612     Blood Culture, Routine No Growth to date      No Growth to date    Narrative:      Draw sample # 2 from separate site    Respiratory Infection Panel (PCR), Nasopharyngeal [[id number]]  (Abnormal) Collected: 10/29/24 1535    Order Status: Completed Specimen: Nasopharyngeal Swab Updated: 10/30/24 0048     Respiratory Infection Panel Source NP Swab     Adenovirus Not Detected     Coronavirus 229E, Common Cold Virus Not Detected     Coronavirus HKU1, Common Cold  Virus Not Detected     Coronavirus NL63, Common Cold Virus Not Detected     Coronavirus OC43, Common Cold Virus Not Detected     Comment: The Coronavirus strains detected in this test cause the common cold.  These strains are not the COVID-19 (novel Coronavirus)strain   associated with the respiratory disease outbreak.          SARS-CoV2 (COVID-19) Qualitative PCR Detected     Human Metapneumovirus Not Detected     Human Rhinovirus/Enterovirus Not Detected     Influenza A (subtypes H1, H1-2009,H3) Not Detected     Influenza B Not Detected     Parainfluenza Virus 1 Not Detected     Parainfluenza Virus 2 Not Detected     Parainfluenza Virus 3 Not Detected     Parainfluenza Virus 4 Not Detected     Respiratory Syncytial Virus Not Detected     Bordetella Parapertussis (SZ6871) Not Detected     Bordetella pertussis (ptxP) Not Detected     Chlamydia pneumoniae Not Detected     Mycoplasma pneumoniae Not Detected    Narrative:      Assay not valid for lower respiratory specimens, alternate  testing required.        p

## 2024-10-31 NOTE — PLAN OF CARE
ICU Care Plan  [facility] - Intensive Care    POC reviewed with Suyapa Connelly. Pt verbalized understanding, but needs frequent reminders and reinforcement. Questions and concerns addressed. Prn Hydrocodone-acetaminophen given once. Pt progressing toward goals. See below and flowsheets for full assessment and VS info.   Temp:  [98.9 °F (37.2 °C)-99.2 °F (37.3 °C)]   Pulse:  [81-90]   Resp:  [10-16]   BP: ()/(44-71)   SpO2:  [88 %-97 %]     Neuro:  Drowsy, in a daze disoriented to time  Carlos Coma Scale  Best Eye Response: 4-->(E4) spontaneous  Best Motor Response: 6-->(M6) obeys commands  Best Verbal Response: 4-->(V4) confused  Carlos Coma Scale Score: 14  Assessment Qualifiers: patient not sedated/intubated, no eye obstruction present   Pupil PERRLA: yes  24hr Temp:  [98 °F (36.7 °C)-99.6 °F (37.6 °C)]     CV:   Rhythm: atrial rhythm, Afib on cardiac monitor    Resp:   Was on 2 Liters NC. Patient took nasal canula off frequently throughout the night. SpO2 can be labile. Maintained O2 sats greater than 90% Patient is bradypnea and has swallow breathing at times  Device (Oxygen Therapy): room air    GI/:  Diet/Nutrition Received: consistent carb/diabetic diet  Last Bowel Movement: 10/30/24  Voiding Characteristics: anuria, patient on hemodialysis  I/O this shift:  In: 269 [P.O.:120; IV Piggyback:149]  Out: -     Intake/Output Summary (Last 24 hours) at 10/31/2024 0627  Last data filed at 10/31/2024 0600  Gross per 24 hour   Intake 940.2 ml   Output 2000 ml   Net -1059.8 ml       Lines/Drains/Airways       Central Venous Catheter Line  Duration                  Hemodialysis Catheter left subclavian -- days              Peripheral Intravenous Line  Duration                  Peripheral IV - Single Lumen 10/28/24 2230 22 G Anterior;Distal;Right Forearm 2 days

## 2024-10-31 NOTE — PLAN OF CARE
Pt awake and alert, will answer my questions, not oriented to time or situation, not talking  otherwise, with Assist from night nurse , changed position, for breakfast, pt started crying, whaling, sobbing, inconsolable, yelling out help me, help me, not time for pain med, trying to do what I can to console  and make her comfortable, but she keeps crying, pain in left stump, left upper thigh, hip,  did not seem as painful  in these areas yesterday, sore but not pain, also rt leg, stump, very painful to any touch, movement , it was sore  in the area where knee was, but not all over like today    Dressing dry and intact, edema present, no inc, dependent edema in hips unchanged,     0750 just now able to Assess her, ap 78 at fib, no chest pain, resp 16, unlabored, shallow, diminished bAses but improved, some nose bleeding with her blowing it, both nostrils, not a great deal, moist towel given to sniff the moisture,

## 2024-10-31 NOTE — PROGRESS NOTES
[facility] [place] Consult Note    Primary Attending Physician: Dr. Starkey  Consultant Attending: Dr. Crawford  Consultant Fellow: Dr. Martel    Subjective:      Interval Events:  Patient remains afebrile and on RA overnight. Drops in BP seem to be associated with norco administration and BP recovers after. Patient got repeat dialysis yesterday with 2L out (total -4.5L in two days). She appears to still be in a lot of pain, especially in bilateral thighs, despite regular pain medication.    Review of Systems:  All other systems are reviewed and are negative.     Objective:   Last 24 Hour Vital Signs:  BP  Min: 82/55  Max: 133/60  Temp  Av °F (37.2 °C)  Min: 98 °F (36.7 °C)  Max: 99.6 °F (37.6 °C)  Pulse  Av.9  Min: 78  Max: 93  Resp  Av.2  Min: 7  Max: 25  SpO2  Av.6 %  Min: 86 %  Max: 100 %  I/O last 3 completed shifts:  In: 1109 [P.O.:690; IV Piggyback:419]  Out:  [Other:]    Physical Examination:  General: resting in bed in visible pain  Cardiovascular: regular rate, irregular rhythm   Respiratory: breathing on RA, lungs on auscultation were clear   Abdomen: soft, non-distended  Extremities: bilateral AKA, edema noted in thighs  Skin: large wound to medial thigh visualized with wound care today (image in media), sacral pressure wound  Neuro: A&Ox3 this morning, conversational, no FND    Laboratory:  Trended Lab Data:  Recent Labs     10/29/24  0505 10/29/24  0847 10/29/24  0856 10/29/24  1030 10/29/24  1031 10/30/24  0538 10/31/24  0536   WBC 8.78  --  9.11  --   --  10.59 9.17   HGB 8.9*  --  9.1*  --   --  9.7* 9.5*   HCT 29.0*   < > 29.3*  --   --  31.0* 29.7*     --  253  --   --  263 267   *  --  133*  --   --  132*  --    K 4.6  --  4.0  --   --  4.3  --    CL 95  --  97  --   --  94*  --    CO2 20*  --  24  --   --  24  --    BUN 43*  --  44*  --   --  27*  --    CREATININE 5.3*  --  5.5*  --   --  3.5*  --    *  --  115*  --   --   101  --    BILITOT 0.8  --  0.8  --   --  0.8  --    *  --  137*  --   --  145*  --    ALT 31  --  35  --   --  42  --    ALKPHOS 555*  --  530*  --   --  491*  --    CALCIUM 8.0*  --  8.3*  --   --  8.4*  --    ALBUMIN 2.1*  --  2.1*  --   --  2.0*  --    PROT 7.8  --  7.4  --   --  7.5  --    MG 2.0  --   --   --  1.8 1.8  --    PHOS 3.3  --   --   --  2.5* 3.4  --    INR  --   --   --  1.4*  --   --   --     < > = values in this interval not displayed.         Radiology:  I have personally reviewed the above labs and imaging.     Assessment:     Ms. Suyapa Connelly is a 59 year old female with a PMHx of ESRD on HD, T2DM, HTN, and HLD who presents to [facility] on 10/25 after being found down on floor by neighbor. On 10/29, MET was called as patient was unresponsive and hypoxic. Last known normal was stated to be the night prior. The cause of her AMS is likely medication induced. Fever likely from COVID infection. Lower concern for sepsis, but continuing broad Abx per ID recs for left thigh infection.     Plan:     Neurology  Acute Encephalopathy, resolved  - MET called due to AMS  - most likely thought to be medication induced as patient was started on gabapentin, and was on trazadone and cefepime.  These medications were discontinued  - could not rule out uremia in setting of ESRD, but unlikely as patient was on scheduled dialysis  - patient conversational and alert this morning    Chronic Pain  - gabapentin was discontinued due to AMS, thought to be factor  - patient with increasing levels of pain since discontinuation  - will restart her home Lyrica to see if that assists with pain  - Also with episodes of hypotension noted following norco q4h use. Will transition to oxy 10mg q12 extended release    Cardiovascular  Hypotension   - initial concern for shock, but BP continues to improve  - Levo ordered but never administered. MAP goal >65  - hypotensive episodes after Norco administration, and  BP recovers after  - adjusted pain medication regimen as mentioned above    Heart Failure with Reduced Ejection Fraction  - previous ECHO May 2024 with EF 15% and severe global hypokinesis  - BNP 1478, trops 0.033  - received dialysis 10/29 and 10/30  - ordered repeat ECHO with improved EF  - discontinued Lasix as patient is anuric     Atrial Fibrillation  - currently rate controlled, not rhythm controlled  - AC with Eliquis 5mg BID  - holding carvedilol in the setting of hypotension    CAD s/p stent  PVD  - CTA of LLE shows extensive plaque within the common, internal, and external iliac arteries and occlusion of the proximal left common iliac artery   - s/p bilateral AKA  - continue plavix and lipitor    Respiratory  Acute Hypoxic Respiratory Failure, resolved  - arrived to MET with spO2 at 84%  - ABG pH 7.38, pCO2 47, pO2 70, bicarb 28  - initially placed on Bipap, but weaned to RA without issue    Gastrointestinal  Elevated ALP, AST, and GGT  Elevated Ammonia, resolved  - lipase and T bili wnl  - ordered RUQ ultrasound as unclear etiology to elevated labs, which showed hepatomegaly with hepatic steatosis and cholelithiasis   - elevated ammonia not likely high enough to be cause of AMS  - Hep B surface Ag reactive, but confirmatory test negative  - as of this morning, elevated labs resolving. Could be secondary to a medication or COVID.    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, risk factors of ESRD on HD, CT imaging with calcifications seen, , Vit D 9  - wound care agrees with possibility  - discussed with nephrology, can consider treatment     Hematology  Macrocytic Anemia  - at baseline    Endocrine  Hyperparathyroidism, secondary  - likely secondary to CKD    Type II Diabetes  - A1C 5.9%  - at goal glucose  on SSI    TSH 3.3    Infectious Disease  Initial Concern for Sepsis  Cellulitis of Left Thigh  - febrile without leukocytosis   - left thigh wound present; also with pressure sacral wound (pictures in media)  - CT thigh with small amount of subcutaneous emphysema   - surgery consulted for concern of nec fasc, but states that is not nec fasc  - CXR without concern for pneumonia.  Unable to get UA due to anuric status.  - ID consulted.  States subcutaneous emphysema likely because it is open wound. Continue vancomycin and zosyn. Gave one dose of flagyl.   - blood cultures taken, NGTD  - wound care following     COVID-19 Infection  - likely etiology to fever  - currently on RA  - given patient is on RA with improving condition, will not give remedesvir or steroids. If patient requires supplemental O2, can start these medications.      ICU Checklist  Feeding: renal diet  Analgesia: Lyrica, PRN Oxy extended release  Sedation: none  Thrombo PPX: Eliquis 5mg BID  Head of Bed: >30 degrees  Ulcer (Stress) PPX: not indicated  Glucose: at goal   SBT/SAT: N/A  Bowel Regimen: miralax and senna, BM yesterday  Indwelling catheter/Lines/Invasive devices: left hemodialysis catheter, PIV  Abx: continue vanc and zosyn, follow ID recs    CODE STATUS: FULL      Magen Dear, DO  [facility], PGY-1

## 2024-10-31 NOTE — PLAN OF CARE
Renuka alfonso and critical care team, here, wound care done, they were Able to see the wound, did yell out in pain, while being done, but able to help lift the stump and move it herself, and once settled, she quieted down, sooner, found new areas of moisture related skin shearing, groins , worse left, cleaned ,dried and triad cream  applied    Finished bath, linen changed, and tuned left side

## 2024-10-31 NOTE — PROGRESS NOTES
Progress Note  [facility]    ASSESSMENT:   Left thigh pain with open wound and concern for cellulitis and subcutaneous emphysema.  Subcutaneous emphysema is likely related to the open nature of the wound.  Foul smell from Wound indicates Gram-negative and anaerobes.  Currently on piperacillin tazobactam and vancomycin  COVID-19   Bilateral AKAs.  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  Patient is status post CABG as well as stents that have clotted in her lower extremities  Diabetes, ESRD      RECOMMENDATIONS:   Continue piperacillin tazobactam vancomycin  Hold off on imaging at the moment  Difficult to determine length of therapy at this time but related to treatment response.  Unfortunately, the aspect of the ischemic lesions complicate assessment of recovery.  Monitor patient progress    Guarded prognosis given all of the underlying issues    Thanks for this consult!. Please call if you have any questions.  Juarez Gamboa  [facility]  [phone] pager    INTERVAL HISTORY:   Patient with extreme pain in both thighs.  Patient had wound care today photographs in media tab.    Medications reviewed; current antibiotics:  Vancomycin  Piperacillin tazobactam    Review of Systems:  Review of Systems   Musculoskeletal:         Extreme thigh pain bilaterally   All other systems reviewed and are negative.      OBJECTIVE:     Vital Signs (Most Recent)  Temp: (P) 98.6 °F (37 °C) (10/31/24 1203)  Pulse: 79 (10/31/24 1145)  Resp: 11 (10/31/24 1145)  BP: 137/63 (10/31/24 1145)  SpO2: 96 % (10/31/24 1145)    Temperature Range Min/Max (Last 24H):  Temp:  [98.6 °F (37 °C)-99.6 °F (37.6 °C)]     Physical Exam:  Physical Exam  Vitals and nursing note reviewed.   Constitutional:       Comments: Alert but in pain   HENT:      Head: Normocephalic and atraumatic.      Mouth/Throat:      Mouth: Mucous membranes are moist.      Pharynx: Oropharynx is clear. No oropharyngeal  exudate.   Eyes:      Conjunctiva/sclera: Conjunctivae normal.      Pupils: Pupils are equal, round, and reactive to light.   Neck:      Comments: Left neck hemodialysis catheter without tenderness  Cardiovascular:      Rate and Rhythm: Tachycardia present.   Abdominal:      General: There is no distension.      Palpations: Abdomen is soft.      Tenderness: There is no abdominal tenderness. There is no guarding.   Genitourinary:     Comments: No Kerns  Musculoskeletal:      Comments: Both hands with contractures and loss of intrinsic hand muscles.  Bilateral thigh tenderness on palpation.  Left thigh wound wrapped.  Decreased drainage from prior exam with much less foul-smell to the drainage Photographs noted   Skin:     Findings: Erythema and lesion present.   Neurological:      Comments: Patient weak but moves all extremities.  Sensation intact throughout.  Not cooperative due to pain.         Laboratory:  Recent Labs   Lab 10/29/24  0856 10/30/24  0538 10/31/24  0536   WBC 9.11 10.59 9.17   HGB 9.1* 9.7* 9.5*   HCT 29.3* 31.0* 29.7*    263 267   * 132* 131*   K 4.0 4.3 4.2   CL 97 94* 93*   CO2 24 24 27   BUN 44* 27* 19   CREATININE 5.5* 3.5* 2.9*   * 145* 112*   ALT 35 42 37   ALKPHOS 530* 491* 568*   BILITOT 0.8 0.8 0.8     Labs: Reviewed    Microbiology:  COVID-19 positive  Blood cultures negative  Other Diagnostic Results:  Reviewed    ASSESSMENT/PLAN:     Active Hospital Problems    Diagnosis  POA    *Cellulitis of left thigh [L03.116]  Yes    Pressure injury of sacral region, unstageable [L89.150]  Yes    Morbid obesity with BMI of 50.0-59.9, adult [E66.01, Z68.43]  Not Applicable    S/P AKA (above knee amputation) bilateral [Z89.611, Z89.612]  Not Applicable    Septic shock [A41.9, R65.21]  Yes    Chronic combined systolic and diastolic congestive heart failure [I50.42]  Yes    Peripheral vascular disease [I73.9]  Yes      Resolved Hospital Problems    Diagnosis Date Resolved POA     Metabolic acidosis [E87.20] 10/29/2024 Yes

## 2024-10-31 NOTE — PLAN OF CARE
Pt continues to cry, tearful, saw that pain med changed, but critical care  Dr Canales, wants me to wait to give 4hrs past last dose, plan to do wound care past dialysis and giving dose of pain med

## 2024-10-31 NOTE — PT/OT/SLP PROGRESS
Physical Therapy  Visit Attempt    Patient Name:  Suyapa Connelly   MRN:  [id number]    Patient not seen today secondary to Dialysis, Pain. Will follow-up tomorrow's date.    10/31/2024

## 2024-10-31 NOTE — PLAN OF CARE
Again told critical care team on pain, crying, plan is to meet for 1245 with Renuka , wound care nurse and do wound cAre, will give pain med prior and dialysis will be over

## 2024-10-31 NOTE — PROGRESS NOTES
10/31/24 1158   Pre-Hemodialysis Assessment   Treatment Status Completed   During Hemodialysis Assessment   Blood Flow Rate (mL/min) 300 mL/min   Dialysate Flow Rate (mL/min) 600 ml/min   Ultrafiltration Rate (mL/Hr) 500 mL/Hr   Arteriovenous Lines Secure Yes   Arterial Pressure (mmHg) -177 mmHg   Venous Pressure (mmHg) 167   Blood Volume Processed (Liters) 0 L   UF Removed (mL) 1500 mL   TMP 4   Venous Line in Air Detector Yes   Intake (mL) 0 mL   Transducer Dry Yes   Access Visible Yes    notified of access issue? N/A   Heparin given? N/A   Intra-Hemodialysis Comments tx complete   Post-Hemodialysis Assessment   Rinseback Volume (mL) 250 mL   Blood Volume Processed (Liters) 54 L   Dialyzer Clearance Lightly streaked   Duration of Treatment 180 minutes   Additional Fluid Intake (mL) 500 mL   Total UF (mL) 1500 mL   Net Fluid Removal 1000   Patient Response to Treatment Pt tolerated tx well   Post-Hemodialysis Comments lines flushed, blood returned

## 2024-10-31 NOTE — PROGRESS NOTES
The sw spoke briefly to the pt's dtr Cornelia Connelly [phone] in reference to the pt. She states she though the pt was still in [place] at [facility]. Cornelia lives in [place] and is unsure if she will come to the hospital but understands the pt will require post acute placement after she discharges from the hospital. The sw was notified by the pt's nurse Deisy that a  from [facility] came to see the pt today. The  did not leave her name or number an didn't wish to speak with the ICU Sw. She states she will call the ICU Sw later. She did say the report came from the staff at the [facility].

## 2024-10-31 NOTE — PLAN OF CARE
Pt in a great deal of pain today, pain med changed oxycodone, lyrica added daily, I think the oxycodone finally did help, but co again of the intense pain with slightest movement, wound care done earlier today, wound care nurse And critical care saw the wound, to continue the antibiotics , plan to relieve pain, and for discharge to send to SNF, pt to be turned as goldie, safety maintained, bed alarm on , side rails up x4, bed low position, call bell easy reach, make frequent rounds to assess needs, pt does not ring bell

## 2024-10-31 NOTE — PLAN OF CARE
Finally calming down, set up for breakfast, did not eat but a few bites, I Attempted to feed her, but took only few more bites, took some nepro      0830  dialysis nurse here, preparing for dialysis,     Medicated for pain    Dr Canales, critical care md by earlier, wants to see the wound, told him we need to coordinate with them , wound care nurse, because pt will not tolerate it    0855 dialysis started

## 2024-10-31 NOTE — PT/OT/SLP PROGRESS
Occupational Therapy      Patient Name:  Suyapa Connelly   MRN:  [id number]    Patient not seen today secondary to Dialysis & nsg reports pt with increased pain & uncontrolled at this time. Will follow-up as able.    10/31/2024

## 2024-10-31 NOTE — PLAN OF CARE
Transferred to [facility] via bed, on monitor, isolation for covid continues, meds, insulin syringes x2, mupirocin, chart, belongings, clothing, wallet, with cards, earphones, blanket, sent with her

## 2024-10-31 NOTE — PLAN OF CARE
Pt had relaxed past turning her, to rt side, but again crying out, sobbing, states pain is in the stump, less in the upper leg, hip, but rt leg still hurts to slightest touch, Dr Canales asked to come and see her, in this state of distress, stated to give the oxycodone now, and that the critical care team , will review her scans again to look to see if something else is occurring    Medicated with oxycodone, again tried to soothe her, with quiet  meditations,does not want music

## 2024-11-01 LAB
ALBUMIN SERPL BCP-MCNC: 2 G/DL (ref 3.5–5.2)
ALP SERPL-CCNC: 766 U/L (ref 40–150)
ALT SERPL W/O P-5'-P-CCNC: 41 U/L (ref 10–44)
ANION GAP SERPL CALC-SCNC: 13 MMOL/L (ref 8–16)
AST SERPL-CCNC: 118 U/L (ref 10–40)
BASOPHILS # BLD AUTO: 0.05 K/UL (ref 0–0.2)
BASOPHILS NFR BLD: 0.5 % (ref 0–1.9)
BILIRUB SERPL-MCNC: 0.8 MG/DL (ref 0.1–1)
BUN SERPL-MCNC: 18 MG/DL (ref 6–20)
CALCIUM SERPL-MCNC: 8.8 MG/DL (ref 8.7–10.5)
CHLORIDE SERPL-SCNC: 93 MMOL/L (ref 95–110)
CO2 SERPL-SCNC: 27 MMOL/L (ref 23–29)
CREAT SERPL-MCNC: 2.6 MG/DL (ref 0.5–1.4)
DIFFERENTIAL METHOD BLD: ABNORMAL
EOSINOPHIL # BLD AUTO: 0.1 K/UL (ref 0–0.5)
EOSINOPHIL NFR BLD: 1.1 % (ref 0–8)
ERYTHROCYTE [DISTWIDTH] IN BLOOD BY AUTOMATED COUNT: 17.9 % (ref 11.5–14.5)
EST. GFR  (NO RACE VARIABLE): 21 ML/MIN/1.73 M^2
GLUCOSE SERPL-MCNC: 188 MG/DL (ref 70–110)
HBV E AG SERPL QL IA: NONREACTIVE
HCT VFR BLD AUTO: 29.3 % (ref 37–48.5)
HGB BLD-MCNC: 9.4 G/DL (ref 12–16)
IMM GRANULOCYTES # BLD AUTO: 0.07 K/UL (ref 0–0.04)
IMM GRANULOCYTES NFR BLD AUTO: 0.7 % (ref 0–0.5)
LYMPHOCYTES # BLD AUTO: 0.5 K/UL (ref 1–4.8)
LYMPHOCYTES NFR BLD: 4.7 % (ref 18–48)
MAGNESIUM SERPL-MCNC: 1.9 MG/DL (ref 1.6–2.6)
MCH RBC QN AUTO: 33.5 PG (ref 27–31)
MCHC RBC AUTO-ENTMCNC: 32.1 G/DL (ref 32–36)
MCV RBC AUTO: 104 FL (ref 82–98)
MONOCYTES # BLD AUTO: 1.3 K/UL (ref 0.3–1)
MONOCYTES NFR BLD: 12.8 % (ref 4–15)
NEUTROPHILS # BLD AUTO: 8 K/UL (ref 1.8–7.7)
NEUTROPHILS NFR BLD: 80.2 % (ref 38–73)
NRBC BLD-RTO: 0 /100 WBC
PHOSPHATE SERPL-MCNC: 2.2 MG/DL (ref 2.7–4.5)
PLATELET # BLD AUTO: 299 K/UL (ref 150–450)
PMV BLD AUTO: 11.7 FL (ref 9.2–12.9)
POCT GLUCOSE: 111 MG/DL (ref 70–110)
POCT GLUCOSE: 127 MG/DL (ref 70–110)
POCT GLUCOSE: 134 MG/DL (ref 70–110)
POCT GLUCOSE: 165 MG/DL (ref 70–110)
POTASSIUM SERPL-SCNC: 3.9 MMOL/L (ref 3.5–5.1)
PROT SERPL-MCNC: 8.6 G/DL (ref 6–8.4)
RBC # BLD AUTO: 2.81 M/UL (ref 4–5.4)
SODIUM SERPL-SCNC: 133 MMOL/L (ref 136–145)
VANCOMYCIN SERPL-MCNC: 25 UG/ML
WBC # BLD AUTO: 9.93 K/UL (ref 3.9–12.7)

## 2024-11-01 PROCEDURE — 97165 OT EVAL LOW COMPLEX 30 MIN: CPT | Mod: HCNC

## 2024-11-01 PROCEDURE — 63600175 PHARM REV CODE 636 W HCPCS: Mod: HCNC | Performed by: INTERNAL MEDICINE

## 2024-11-01 PROCEDURE — 25000003 PHARM REV CODE 250: Mod: HCNC

## 2024-11-01 PROCEDURE — 94761 N-INVAS EAR/PLS OXIMETRY MLT: CPT | Mod: HCNC

## 2024-11-01 PROCEDURE — 83735 ASSAY OF MAGNESIUM: CPT | Mod: HCNC | Performed by: INTERNAL MEDICINE

## 2024-11-01 PROCEDURE — 80202 ASSAY OF VANCOMYCIN: CPT | Mod: HCNC | Performed by: FAMILY MEDICINE

## 2024-11-01 PROCEDURE — 25000003 PHARM REV CODE 250: Mod: HCNC | Performed by: INTERNAL MEDICINE

## 2024-11-01 PROCEDURE — 97530 THERAPEUTIC ACTIVITIES: CPT | Mod: HCNC

## 2024-11-01 PROCEDURE — 80053 COMPREHEN METABOLIC PANEL: CPT | Mod: HCNC | Performed by: INTERNAL MEDICINE

## 2024-11-01 PROCEDURE — 11000001 HC ACUTE MED/SURG PRIVATE ROOM: Mod: HCNC

## 2024-11-01 PROCEDURE — 27000207 HC ISOLATION: Mod: HCNC

## 2024-11-01 PROCEDURE — 94660 CPAP INITIATION&MGMT: CPT | Mod: HCNC

## 2024-11-01 PROCEDURE — 85025 COMPLETE CBC W/AUTO DIFF WBC: CPT | Mod: HCNC | Performed by: INTERNAL MEDICINE

## 2024-11-01 PROCEDURE — 97162 PT EVAL MOD COMPLEX 30 MIN: CPT | Mod: HCNC

## 2024-11-01 PROCEDURE — 99900035 HC TECH TIME PER 15 MIN (STAT): Mod: HCNC

## 2024-11-01 PROCEDURE — 63600175 PHARM REV CODE 636 W HCPCS: Mod: HCNC | Performed by: FAMILY MEDICINE

## 2024-11-01 PROCEDURE — 84100 ASSAY OF PHOSPHORUS: CPT | Mod: HCNC | Performed by: INTERNAL MEDICINE

## 2024-11-01 PROCEDURE — 36415 COLL VENOUS BLD VENIPUNCTURE: CPT | Mod: HCNC | Performed by: FAMILY MEDICINE

## 2024-11-01 RX ORDER — CINACALCET 30 MG/1
30 TABLET, FILM COATED ORAL
Status: DISCONTINUED | OUTPATIENT
Start: 2024-11-01 | End: 2024-11-04

## 2024-11-01 RX ORDER — SODIUM THIOSULFATE 250 MG/ML
12.5 INJECTION, SOLUTION INTRAVENOUS ONCE
Status: COMPLETED | OUTPATIENT
Start: 2024-11-02 | End: 2024-11-02

## 2024-11-01 RX ORDER — KETOROLAC TROMETHAMINE 30 MG/ML
15 INJECTION, SOLUTION INTRAMUSCULAR; INTRAVENOUS EVERY 6 HOURS PRN
Status: DISCONTINUED | OUTPATIENT
Start: 2024-11-02 | End: 2024-11-03

## 2024-11-01 RX ORDER — KETOROLAC TROMETHAMINE 30 MG/ML
15 INJECTION, SOLUTION INTRAMUSCULAR; INTRAVENOUS EVERY 6 HOURS
Status: COMPLETED | OUTPATIENT
Start: 2024-11-01 | End: 2024-11-02

## 2024-11-01 RX ADMIN — OXYCODONE HYDROCHLORIDE 10 MG: 10 TABLET, FILM COATED, EXTENDED RELEASE ORAL at 02:11

## 2024-11-01 RX ADMIN — MUPIROCIN: 20 OINTMENT TOPICAL at 09:11

## 2024-11-01 RX ADMIN — INSULIN GLARGINE 5 UNITS: 100 INJECTION, SOLUTION SUBCUTANEOUS at 09:11

## 2024-11-01 RX ADMIN — OXYCODONE HYDROCHLORIDE 10 MG: 10 TABLET, FILM COATED, EXTENDED RELEASE ORAL at 12:11

## 2024-11-01 RX ADMIN — PIPERACILLIN SODIUM AND TAZOBACTAM SODIUM 4.5 G: 4; .5 INJECTION, POWDER, LYOPHILIZED, FOR SOLUTION INTRAVENOUS at 05:11

## 2024-11-01 RX ADMIN — CLOPIDOGREL BISULFATE 75 MG: 75 TABLET ORAL at 08:11

## 2024-11-01 RX ADMIN — ATORVASTATIN CALCIUM 80 MG: 40 TABLET, FILM COATED ORAL at 08:11

## 2024-11-01 RX ADMIN — INSULIN ASPART 2 UNITS: 100 INJECTION, SOLUTION INTRAVENOUS; SUBCUTANEOUS at 06:11

## 2024-11-01 RX ADMIN — APIXABAN 5 MG: 5 TABLET, FILM COATED ORAL at 09:11

## 2024-11-01 RX ADMIN — ACETAMINOPHEN 650 MG: 325 TABLET ORAL at 08:11

## 2024-11-01 RX ADMIN — SERTRALINE HYDROCHLORIDE 100 MG: 100 TABLET ORAL at 08:11

## 2024-11-01 RX ADMIN — KETOROLAC TROMETHAMINE 15 MG: 30 INJECTION, SOLUTION INTRAMUSCULAR; INTRAVENOUS at 11:11

## 2024-11-01 RX ADMIN — APIXABAN 5 MG: 5 TABLET, FILM COATED ORAL at 08:11

## 2024-11-01 RX ADMIN — KETOROLAC TROMETHAMINE 15 MG: 30 INJECTION, SOLUTION INTRAMUSCULAR; INTRAVENOUS at 05:11

## 2024-11-01 RX ADMIN — PIPERACILLIN SODIUM AND TAZOBACTAM SODIUM 4.5 G: 4; .5 INJECTION, POWDER, LYOPHILIZED, FOR SOLUTION INTRAVENOUS at 04:11

## 2024-11-01 RX ADMIN — CINACALCET HYDROCHLORIDE 30 MG: 30 TABLET, FILM COATED ORAL at 05:11

## 2024-11-01 RX ADMIN — PREGABALIN 50 MG: 50 CAPSULE ORAL at 08:11

## 2024-11-01 RX ADMIN — MUPIROCIN: 20 OINTMENT TOPICAL at 08:11

## 2024-11-01 NOTE — PROGRESS NOTES
[facility]  Progress Note    Patient Name: Suyapa Connelly  MRN: [id number]  Patient Class: IP- Inpatient   Admission Date: 10/28/2024  Length of Stay: 4 days  Attending Physician: Shae Starkey MD  Primary Care Provider: Vanessa Noel MD      Subjective:     Principal Problem:Cellulitis of left thigh      HPI:  Patient is a 59-year-old female with a known past medical history of hypertension, hyperlipidemia, diabetes mellitus type 2, ESRD on HD, and deconditioning that presents the ER after being on floor throughout the evening.     Patient presents after being found on the floor by a neighbor today.  Patient reports that she got out of bed to change her pad and diaper but was unable to get back into bed, staying on the floor throughout the evening.  Patient's  who normally cares for her, was arrested yesterday and is in longterm and patient was unable to care for herself.  When patient was found by neighbor on floor, EMS was called and patient was brought to the ER for further evaluation.  On arrival to ER, patient in no acute distress.  Labs essentially within normal limits.  Patient last dialyzed on Thursday and no signs of fluid overload or electrolyte abnormalities.  Patient has no family NS able to assist her at home other than her  who is currently incarcerated.  Patient admitted for social problems and hemodialysis.    Patient due to her open wounds and CT finding of her left thigh - emphysema can not rule out infection -     Overview/Hospital Course:  No notes on file    Interval History: NAEON. Patient getting changed. Pain uncontrolled.     Review of Systems   Constitutional:  Negative for chills and fever.   Respiratory:  Negative for shortness of breath.    Cardiovascular:  Negative for chest pain.   Gastrointestinal:  Negative for abdominal pain.   Genitourinary:  Negative for dysuria.   Musculoskeletal:  Positive for arthralgias.        Chronic  stump pain   Neurological:  Negative for headaches.   Psychiatric/Behavioral:  Negative for confusion.      Objective:     Vital Signs (Most Recent):  Temp: 98 °F (36.7 °C) (11/01/24 1153)  Pulse: 77 (11/01/24 1251)  Resp: 18 (11/01/24 1153)  BP: 121/60 (11/01/24 1153)  SpO2: (!) 93 % (11/01/24 1153) Vital Signs (24h Range):  Temp:  [97.2 °F (36.2 °C)-99 °F (37.2 °C)] 98 °F (36.7 °C)  Pulse:  [76-92] 77  Resp:  [8-23] 18  SpO2:  [89 %-100 %] 93 %  BP: ()/(45-78) 121/60     Weight: 68 kg (150 lb)  Body mass index is 45.77 kg/m².    Intake/Output Summary (Last 24 hours) at 11/1/2024 1343  Last data filed at 10/31/2024 1700  Gross per 24 hour   Intake 380.13 ml   Output --   Net 380.13 ml         Physical Exam  Vitals and nursing note reviewed.   Constitutional:       General: She is not in acute distress.     Appearance: She is well-developed. She is ill-appearing (chronically).   HENT:      Head: Normocephalic and atraumatic.   Eyes:      Conjunctiva/sclera: Conjunctivae normal.   Neck:      Vascular: No JVD.   Cardiovascular:      Rate and Rhythm: Normal rate and regular rhythm.      Heart sounds: Normal heart sounds.   Pulmonary:      Effort: Pulmonary effort is normal.      Breath sounds: Normal breath sounds.   Abdominal:      General: Bowel sounds are normal. There is no distension.      Palpations: Abdomen is soft.      Tenderness: There is no abdominal tenderness.   Musculoskeletal:      Cervical back: Neck supple.      Right lower leg: No edema.      Left lower leg: No edema.      Comments: B/L AKA  R stump swelling, L stump with gauze    Neurological:      Mental Status: She is easily aroused. She is lethargic.   Psychiatric:         Mood and Affect: Affect is tearful.         Behavior: Behavior normal.             Significant Labs: All pertinent labs within the past 24 hours have been reviewed.  CBC:   Recent Labs   Lab 10/31/24  0536 11/01/24  0350   WBC 9.17 9.93   HGB 9.5* 9.4*   HCT 29.7* 29.3*     299     CMP:   Recent Labs   Lab 10/31/24  0536 11/01/24  0351   * 133*   K 4.2 3.9   CL 93* 93*   CO2 27 27   * 188*   BUN 19 18   CREATININE 2.9* 2.6*   CALCIUM 8.7 8.8   PROT 7.8 8.6*   ALBUMIN 2.0* 2.0*   BILITOT 0.8 0.8   ALKPHOS 568* 766*   * 118*   ALT 37 41   ANIONGAP 11 13       Significant Imaging:  no new    Assessment/Plan:      * Cellulitis of left thigh  L AKA- necrotic amputation surgical site- developing eschar 9x15x0.2cm and blisters to periwound  Continue IV antibiotics   General surgery consulted for wound debridement. Recs: continue recommendations per Wound Care   - Aquacel AG daily to L AKA        ID recs:   Continue vancomycin/ piperacillin tazobactam  Given one dose of metronidazole.  Difficult to determine length of therapy at this time but related to treatment response. Unfortunately, the aspect of the ischemic lesions complicate assessment of recovery.  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  Patient is status post CABG as well as stents that have clotted in her lower extremities    Discussed with nephro-- ok to add IV Toradol.     Pressure injury of sacral region, unstageable  Wound care    - Pressure injury prevention interventions   - Triad ointment BID to sacrum/buttocks      Morbid obesity with BMI of 50.0-59.9, adult  Body mass index is 45.77 kg/m². Morbid obesity complicates all aspects of disease management from diagnostic modalities to treatment.           S/P AKA (above knee amputation) bilateral  Fall precautions     Septic shock  This patient has shock. The type of shock is distributive due to sepsis. She was admitted to the intensive care unit 10/29. Stepped down 10/31.   Continue IV abx.    Patient did not require IVF resuscitation or pressors. BP recovered, but still soft at times.     Chronic combined systolic and diastolic congestive heart failure  Patient has Combined Systolic and Diastolic heart failure that is Chronic. On  presentation their CHF was well compensated. Most recent BNP and echo results are listed below.  Recent Labs     10/29/24  0853   BNP 1,478*     Latest ECHO  Results for orders placed during the hospital encounter of 05/07/24    Echo    Interpretation Summary    Left Ventricle: The left ventricle is mildly dilated. Ventricular mass is normal. Normal wall thickness. Severe global hypokinesis present. There is severely reduced systolic function. Ejection fraction by visual approximation is 15%. There is diastolic dysfunction.    Right Ventricle: Severe right ventricular enlargement. Wall thickness is normal. Right ventricle wall motion has global hypokinesis. Systolic function is mildly reduced.    Left Atrium: Left atrium is severely dilated. There is an atrial septal aneurysm.    Right Atrium: Right atrium is dilated.    Aortic Valve: There is mild aortic regurgitation.    Mitral Valve: There is mild regurgitation.    Tricuspid Valve: There is annular dilation present. There is normal leaflet mobility. There is severe functional regurgitation.    Pulmonary Artery: The estimated pulmonary artery systolic pressure is at least 24 mmHg. PASP is likely under-estimated in the setting of severe tricuspid regurgitation.    IVC/SVC: Central venous pressure of at least 8 mmHg.    No vegetation seen.    Current Heart Failure Medications       Plan  - Monitor strict I&Os and daily weights.    - Place on telemetry  - Low sodium diet  - Place on fluid restriction of 1 L.   - Cardiology has not been consulted  - The patient's volume status is stable but not at their baseline as indicated by edema- UF with HD as tolerated. Lasix DC'd 10/30 due to low BP        Peripheral vascular disease  On Eliquis/ plavix      VTE Risk Mitigation (From admission, onward)           Ordered     apixaban tablet 5 mg  2 times daily         10/28/24 2114     IP VTE HIGH RISK PATIENT  Once         10/28/24 2114     Place sequential compression device   Until discontinued         10/28/24 2114     Reason for No Pharmacological VTE Prophylaxis  Once        Question:  Reasons:  Answer:  Already adequately anticoagulated on oral Anticoagulants    10/28/24 2114     heparin (porcine) injection 3,600 Units  As needed (PRN)         10/28/24 2114                    Discharge Planning   DEVAN: 11/1/2024     Code Status: Full Code   Is the patient medically ready for discharge?:     Reason for patient still in hospital (select all that apply): Patient trending condition, Treatment, and Consult recommendations  Discharge Plan A: Skilled Nursing Facility          Shae Starkey MD  Department of [facility] Medicine   [place] - Intensive [facility]

## 2024-11-01 NOTE — PLAN OF CARE
Virtual Nurse note: Patient chart, labs, and vitals reviewed. Care plan and goals updated as needed.   VN to continue to be available as needed.     Problem: Adult Inpatient Plan of Care  Goal: Plan of Care Review  11/1/2024 1234 by Dasha Bowens, RN  Outcome: Progressing  11/1/2024 1234 by Dasha Bowens, RN  Outcome: Progressing  Goal: Patient-Specific Goal (Individualized)  11/1/2024 1234 by Dasha Bowens, RN  Outcome: Progressing  11/1/2024 1234 by Dasha Bowens, RN  Outcome: Progressing  Goal: Absence of Hospital-Acquired Illness or Injury  Outcome: Progressing  Goal: Optimal Comfort and Wellbeing  Outcome: Progressing  Goal: Readiness for Transition of Care  Outcome: Progressing

## 2024-11-01 NOTE — PROGRESS NOTES
Progress Note  Nephrology      Consult Requested By: Shae Starkey MD      SUBJECTIVE:     Overnight events  Patient is a 59 y.o. female     Patient Active Problem List   Diagnosis    Peripheral vascular disease    Acute encephalopathy    Hypocalcemia    Vitamin D deficiency    CAROLIN (generalized anxiety disorder)    Acute cystitis without hematuria    Type 2 diabetes mellitus with hyperglycemia, with long-term current use of insulin    CAD (coronary artery disease)    S/P CABG x 1    Anemia due to chronic kidney disease, on chronic dialysis    Paroxysmal atrial fibrillation    Hyponatremia    S/P femoral-popliteal bypass surgery    Thrombosis of femoro-popliteal bypass graft    Impaired functional mobility and endurance    Nephrotic syndrome    Encephalopathy    Uremia    Vascular graft infection    Hypoalbuminemia    Chronic combined systolic and diastolic congestive heart failure    Postmenopausal bleeding    Uterine fibroid    Muscle wasting and atrophy, not elsewhere classified, right thigh     Adjustment disorder with mixed anxiety and depressed mood    Phantom limb syndrome with pain    Peripheral neuropathic pain    Impaired eye movement    Septic shock    Myoclonus    Dermatitis associated with moisture    Uses self-applied continuous glucose monitoring device    S/P AKA (above knee amputation) bilateral    Hyperkalemia    Debility    ESRD (end stage renal disease)    Myalgia    Chronic kidney disease-mineral and bone disorder    Uremic encephalopathy    Acute metabolic encephalopathy    Morbid obesity with BMI of 50.0-59.9, adult    Hypoglycemia    Depression    Unresponsive    Thrombocytopenia    Severe obesity (BMI >= 40)    Irritant contact dermatitis due to friction or contact with body fluids, unspecified    Pressure injury of sacral region, unstageable    Confusion    Discharge planning issues    Postconcussive syndrome    Screening for cervical cancer    Need for vaccination    Mastodynia     Need for Tdap vaccination    Type 2 diabetes mellitus with chronic kidney disease on chronic dialysis, with long-term current use of insulin    Atherosclerosis of native coronary artery of native heart with angina pectoris    Breast asymmetry    Psychophysiological insomnia    Social problem    Open thigh wound, left, initial encounter    Cellulitis of left thigh     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    Osteomyelitis     Osteomyelitis of left foot 04/30/2021    Ulcer of left foot     Vaginal delivery     x1              OBJECTIVE:     Vitals:    11/01/24 0733 11/01/24 1153 11/01/24 1251 11/01/24 1407   BP: (!) 112/58 121/60     BP Location: Right arm Right arm     Patient Position: Lying Lying     Pulse: 78 76 77    Resp: 18 18  16   Temp: 98.5 °F (36.9 °C) 98 °F (36.7 °C)     TempSrc: Oral Oral     SpO2: (!) 92% (!) 93%     Weight:       Height:           Temp: 98 °F (36.7 °C) (11/01/24 1153)  Pulse: 77 (11/01/24 1251)  Resp: 16 (11/01/24 1407)  BP: 121/60 (11/01/24 1153)  SpO2: (!) 93 % (11/01/24 1153)              Medications:   apixaban  5 mg Oral BID    atorvastatin  80 mg Oral Daily    clopidogreL  75 mg Oral Daily    insulin glargine U-100  5 Units Subcutaneous QHS    ketorolac  15 mg Intravenous Q6H    mupirocin   Nasal BID    piperacillin-tazobactam (Zosyn) IV (PEDS and ADULTS) (extended infusion is not appropriate)  4.5 g Intravenous Q12H    pregabalin  50 mg Oral Daily    sertraline  100 mg Oral Daily     Physical Exam:  General appearance:NAD  Lungs: RR 16  Pulse oximeter 93 % O2  Heart: Pulse 77  Abdomen: soft  Extremities: edema    Laboratory:  ABG  Labs reviewed  No results found for this or any previous visit (from the past  "2 weeks).  Recent Results (from the past 2 weeks)   CBC with Automated Differential    Collection Time: 11/01/24  3:50 AM   Result Value Ref Range    WBC 9.93 3.90 - 12.70 K/uL    Hemoglobin 9.4 (L) 12.0 - 16.0 g/dL    Hematocrit 29.3 (L) 37.0 - 48.5 %    Platelets 299 150 - 450 K/uL   CBC with Automated Differential    Collection Time: 10/31/24  5:36 AM   Result Value Ref Range    WBC 9.17 3.90 - 12.70 K/uL    Hemoglobin 9.5 (L) 12.0 - 16.0 g/dL    Hematocrit 29.7 (L) 37.0 - 48.5 %    Platelets 267 150 - 450 K/uL   CBC with Automated Differential    Collection Time: 10/30/24  5:38 AM   Result Value Ref Range    WBC 10.59 3.90 - 12.70 K/uL    Hemoglobin 9.7 (L) 12.0 - 16.0 g/dL    Hematocrit 31.0 (L) 37.0 - 48.5 %    Platelets 263 150 - 450 K/uL     Urinalysis  No results for input(s): "COLORU", "CLARITYU", "SPECGRAV", "PHUR", "PROTEINUA", "GLUCOSEU", "BILIRUBINCON", "BLOODU", "WBCU", "RBCU", "BACTERIA", "MUCUS", "NITRITE", "LEUKOCYTESUR", "UROBILINOGEN", "HYALINECASTS" in the last 24 hours.    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

## 2024-11-01 NOTE — PLAN OF CARE
Pt would benefit from cont OT services in order to maximize functional independence. Recommending moderate intensity therapy upon d/c. Pt limited this date by increased pain to BLE. Pt attempting sup>sit with MaxA x2, unable to achieve full sit EOB this date. Will progress as able.     Problem: Occupational Therapy  Goal: Occupational Therapy Goal  Description: Goals to be met by: 12/1/2024     Patient will increase functional independence with ADLs by performing:    UE Dressing with Set-up Assistance.  LE Dressing with Minimal Assistance.  Grooming while EOB with Set-up Assistance.  Sitting at edge of bed x10 minutes with Supervision.  Rolling to Bilateral with Modified [place].   Supine to sit with Minimal Assistance.    Outcome: Progressing

## 2024-11-01 NOTE — PT/OT/SLP EVAL
Occupational Therapy   Evaluation    Name: Suyapa Connelly  MRN: [id number]  Admitting Diagnosis: Cellulitis of left thigh  Recent Surgery: * No surgery found *      Recommendations:     Discharge Recommendations: Moderate Intensity Therapy  Discharge Equipment Recommendations:  to be determined by next level of care, lift device  Barriers to discharge:  Decreased caregiver support, Other (Comment) (Fall risk; requires increased level of assist)    Assessment:     Suyapa Connelly is a 59 y.o. female with a medical diagnosis of Cellulitis of left thigh.  She presents with The primary encounter diagnosis was Acute encephalopathy. Diagnoses of Wound infection, Leg wound, left, Chest pain, and Cellulitis of left thigh were also pertinent to this visit. Performance deficits affecting function: weakness, impaired endurance, impaired functional mobility, gait instability, impaired balance, impaired self care skills, decreased lower extremity function, impaired skin, decreased ROM, decreased coordination, pain, impaired joint extensibility, impaired coordination, decreased safety awareness.      Rehab Prognosis: Fair; patient would benefit from acute skilled OT services to address these deficits and reach maximum level of function.       Plan:     Patient to be seen 3 x/week to address the above listed problems via self-care/home management, therapeutic activities, therapeutic exercises  Plan of Care Expires: 12/1/2024   Plan of Care Reviewed with: patient    Subjective     Chief Complaint: Pain  Patient/Family Comments/goals: Agreeable to participate in therapy session    Occupational Profile:  Pt was too emotional / tearful to speak much to therapists; per chart:  Pt is independent with some ADL's. However, she fell at home while trying to do some of her ADL's. She lives at home with spouse who is currently incarcerated. Has no other caregiver. Goes to dialysis 3 x a week. Has Tyrell AKA. She has a wheelchair, no HH  services. Equipment used at home: wheelchair, slide board.  DME owned (not currently used): none.    Upon discharge, patient will have assistance from unknown.     Pain/Comfort:  Pain Rating 1: other (see comments) (significant pain, tearful but did not rate)  Location - Side 1: Bilateral  Location 1:  (residual limbs/thighs)  Pain Addressed 1: Reposition, Distraction, Cessation of Activity, Nurse notified  Pain Rating Post-Intervention 1: 8/10    Patients cultural, spiritual, Roman [employer] conflicts given the current situation: no    Objective:     Communicated with: [email or web address] prior to session.  Patient found HOB elevated with telemetry (LUE HD cath) upon OT entry to room.    General Precautions: Standard, fall, droplet, contact, airborne  Orthopedic Precautions: N/A  Braces: N/A  Respiratory Status: Room air    Occupational Performance:    Bed Mobility:    Rolling Left:  maximal assistance and total assistance  Scooting: maximal assistance, total assistance, and of 2 persons  Supine to Sit: maximal assistance, total assistance, and of 2 persons  Sit to Supine: maximal assistance, total assistance, and of 2 persons    Cognitive/Visual Perceptual:  Cognitive/Psychosocial Skills:     -       Oriented to: Person, Place, and limited 2/2 tearful when daughter calling to speak to pt   -       Safety awareness/insight to disability: impaired   -       Mood/Affect/Coping skills/emotional control: Tearful    Physical Exam:  Limited testing 2/2 pain, BUE appear WFL ROM distally, decreased strength noted BUE upon bed mobility with use of bed rails, ongoing assessment    [facility] 6 Click ADL:  AMPAC Total Score: 11    Treatment & Education:  Pt would benefit from cont OT services in order to maximize functional independence.    Pt limited this date by increased pain to BLE.   Pt given increased encouragement & education on participation in therapy.  Pt attempting sup>sit with MaxA x2, unable to achieve full sit EOB this date.   Will  progress as able.     Patient left HOB elevated with all lines intact, call button in reach, bed alarm on, and nsg notified    GOALS:   Multidisciplinary Problems       Occupational Therapy Goals          Problem: Occupational Therapy    Goal Priority Disciplines Outcome Interventions   Occupational Therapy Goal     OT, PT/OT Progressing    Description: Goals to be met by: 12/1/2024     Patient will increase functional independence with ADLs by performing:    UE Dressing with Set-up Assistance.  LE Dressing with Minimal Assistance.  Grooming while EOB with Set-up Assistance.  Sitting at edge of bed x10 minutes with Supervision.  Rolling to Bilateral with Modified [place].   Supine to sit with Minimal Assistance.                         History:     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    Osteomyelitis     Osteomyelitis of left foot 04/30/2021    Ulcer of left foot     Vaginal delivery     x1         Past Surgical History:   Procedure Laterality Date    ABOVE-KNEE AMPUTATION Left 5/18/2021    Procedure: AMPUTATION, ABOVE KNEE;  Surgeon: Teddy Huber MD;  Location: [address];  Service: Vascular;  Laterality: Left;    ABOVE-KNEE AMPUTATION Right 3/18/2022    Procedure: AMPUTATION, ABOVE KNEE;  Surgeon: DAYNE Florez II, MD;  Location: [facility] OR [address] [place] [address];  Service: Vascular;  Laterality: Right;    Angiogram - Right Extremity Right 7/9/15    angiogram-left leg  10/6/15    ANGIOGRAPHY OF LOWER EXTREMITY Left 4/29/2021    Procedure: ANGIOGRAM, LOWER EXTREMITY;  Surgeon: Teddy Huber MD;  Location: [address];  Service: Vascular;  Laterality: Left;    BELOW KNEE AMPUTATION OF LOWER EXTREMITY Right 12/28/2021     Procedure: AMPUTATION, BELOW KNEE;  Surgeon: Kaitlyn Rojas MD;  Location: [facility] OR;  Service: General;  Laterality: Right;    CATHETERIZATION OF BOTH LEFT AND RIGHT HEART N/A 12/18/2019    Procedure: CATHETERIZATION, HEART, BOTH LEFT AND RIGHT;  Surgeon: Que Fernando III, MD;  Location: [facility] CATH LAB;  Service: Cardiology;  Laterality: N/A;    CORONARY ANGIOGRAPHY N/A 12/18/2019    Procedure: ANGIOGRAM, CORONARY ARTERY;  Surgeon: Que Fernando III, MD;  Location: [facility] CATH LAB;  Service: Cardiology;  Laterality: N/A;    CORONARY ANGIOGRAPHY INCLUDING BYPASS GRAFTS WITH CATHETERIZATION OF LEFT HEART N/A 7/28/2020    Procedure: ANGIOGRAM, CORONARY, INCLUDING BYPASS GRAFT, WITH LEFT HEART CATHETERIZATION, 9 am;  Surgeon: Rachel Easley MD;  Location: [facility] CATH LAB;  Service: Cardiology;  Laterality: N/A;    CORONARY ARTERY BYPASS GRAFT (CABG) N/A 1/14/2020    Procedure: CORONARY ARTERY BYPASS GRAFT (CABG) x 1     Off Pump;  Surgeon: Huang Altamirano MD;  Location: [address];  Service: Cardiovascular;  Laterality: N/A;    CREATION OF FEMORAL-TIBIAL ARTERY BYPASS Left 4/29/2021    Procedure: CREATION, BYPASS, ARTERIAL, FEMORAL TO ANTERIOR TIBIAL;  Surgeon: Teddy Huber MD;  Location: [address];  Service: Vascular;  Laterality: Left;    CREATION OF FEMOROPOPLITEAL ARTERIAL BYPASS USING GRAFT Left 8/18/2020    Procedure: CREATION, BYPASS, ARTERIAL, FEMORAL TO POPLITEAL, USING GRAFT, LEFT LOWER EXTREMITY;  Surgeon: Teddy Huber MD;  Location: [facility];  Service: Vascular;  Laterality: Left;  REQUEST 7:15 A.M. START----COVID NEGATIVE ON 8/17  1ST CASE STARTKENYA DUEÑAS ON 8/7/2020 @ 942AM-LO  RN PREOP 8/12/2020   T/S-----CLEARED BY CARDS-------PENDING INSURANCE    DEBRIDEMENT OF FOOT Left 9/8/2020    Procedure: DEBRIDEMENT, FOOT;  Surgeon: Rosio Mayes DPM;  Location: [facility];  Service: Podiatry;  Laterality: Left;  request neoxx .   RN Pre Op 9-4-2020, Covid negative on  9/5/20. C A    DEBRIDEMENT OF FOOT  3/4/2021    Procedure: DEBRIDEMENT, FOOT;  Surgeon: Teddy Huber MD;  Location: [facility] OR;  Service: Vascular;;    DEBRIDEMENT OF FOOT Left 3/9/2021    Procedure: DEBRIDEMENT, FOOT, bone biopsy;  Surgeon: Rosio Mayes DPM;  Location: [facility] OR;  Service: Podiatry;  Laterality: Left;  Request neoxx---COVID IN AM  REQUESTING NOON START  RN Phone Pre op.On Blood thinners Plavix and Eliquis.  Covid am of surgery. C A    DEBRIDEMENT OF FOOT Left 5/4/2021    Procedure: DEBRIDEMENT, FOOT;  Surgeon: Farooq Morley DPM;  [facility];  Service: Podiatry;  Laterality: Left;    INSERTION OF TUNNELED CENTRAL VENOUS HEMODIALYSIS CATHETER N/A 1/27/2020    Procedure: Insertion, Catheter, Central Venous, Hemodialysis;  Surgeon: ESTEBAN Gomez III, MD;  Location: [facility] CATH LAB;  Service: Peripheral Vascular;  Laterality: N/A;    INSERTION OF TUNNELED CENTRAL VENOUS HEMODIALYSIS CATHETER  5/10/2023    Procedure: Insertion, Catheter, Central Venous, Hemodialysis;  Surgeon: Romulo Queen MD;  Location: [facility] CATH LAB;  Service: Cardiology;;    PERCUTANEOUS TRANSLUMINAL ANGIOPLASTY N/A 3/4/2021    Procedure: PTA (ANGIOPLASTY, PERCUTANEOUS, TRANSLUMINAL);  Surgeon: Teddy Huber MD;  Location: [facility] OR;  Service: Vascular;  Laterality: N/A;    REMOVAL OF ARTERIOVENOUS GRAFT Left 5/27/2021    Procedure: REMOVAL, GRAFT, LEFT LOWER EXTREMITY, WOUND EXPLORATION;  Surgeon: Teddy Huber MD;  Location: [facility] 2ND FLR;  Service: Vascular;  Laterality: Left;    REMOVAL OF NAIL OF DIGIT Left 3/9/2021    Procedure: REMOVAL, NAIL, DIGIT;  Surgeon: Rosio Mayes DPM;  Location: [facility] OR;  Service: Podiatry;  Laterality: Left;    RIGHT HEART CATHETERIZATION Right 5/10/2023    Procedure: INSERTION, CATHETER, RIGHT HEART;  Surgeon: Romulo Queen MD;  Location: [facility] CATH LAB;  Service: Cardiology;  Laterality: Right;    THROMBECTOMY Left 3/4/2021    Procedure:  THROMBECTOMY, LEFT LOWER EXTREMITY BYPASS GRAFT, ANGIOGRAM, POSSIBLE INTERVENTION, POSSIBLE LEFT LOWER EXTREMITY BYPASS;  Surgeon: Teddy Huber MD;  Location: [facility] OR;  Service: Vascular;  Laterality: Left;    THROMBECTOMY Left 4/29/2021    Procedure: GRAFT THROMBECTOMY, LEFT LOWER EXTREMITY;  Surgeon: Teddy Huber MD;  [facility];  Service: Vascular;  Laterality: Left;  14.5 min  1179.85 mGy  341.01 Gycm2  240 ml dye    THROMBECTOMY  10/22/2021    Procedure: THROMBECTOMY;  Surgeon: Saad Arenas MD;  Location: [facility] CATH LAB/EP;  Service: Cardiology;;       Time Tracking:     OT Date of Treatment: 11/01/24  OT Start Time: 1105  OT Stop Time: 1128  OT Total Time (min): 23 min w/PT    Billable Minutes:Evaluation 10  Therapeutic Activity 13    11/1/2024

## 2024-11-01 NOTE — PLAN OF CARE
[id number]  [facility] Awaiting response.        11/01/24 0963   Rounds   Attendance Provider;Nurse    Discharge Plan A Skilled Nursing Facility   Why the patient remains in the hospital Requires continued medical care   Transition of Care Barriers Transportation     1030  CM was informed by Mayco ([phone]) w/[facility] that they are interest in accepting the pt but requested a urine tox screen. CM informed Dr Starkey of above.     1110  CM was informed by Dr Starkey that the pt is not medically stable to discharge to SNF today. Pt stepdown from ICU on 10/31/2024, was admitted with left thigh cellulitis, & is being followed by ID, neph, gen surg, wound care, & PT/OT. Pt (+) covid on 10/29/2024.    Patient lives with her spouse, Randell Connelly, who was recently incarcerated. Pt has a history of max AKA & receives HD treatments at [facility] (TTS 1000) via left ramírez. Randell is the pt's primary caregiver & provided transportation to/from HD session. Pt does not have anyone else to provide support.     CM informed the pt of PT/OT recs for SNF placement following discharge. Pt verbalized understanding, did not have a SNF preference, but requested a facility either near [facility] or [facility].     Previously scheduled appointments with Dr Huber (vasc surg on 11/6/2024 at 1130 & Dr Vanessa Noel (PCP) on 11/15/2024 at 1300. Information added to the pt's discharge paperwork.    CM updated patient's whiteboard with CM name & contact information.     1515  Additional message sent to Dr Starkey requesting a urine tox screen for possible admission to [facility] facilities. Awaiting response.     1425  Voicemail message left for the pt's daughter, Sajan Connelly ([phone]), requesting a return call to discuss the pt's discharge plan. Awaiting response.     1550  CM was informed by Mayco that the pt will be accepted to [facility].  CM informed the pt of above. Pt verbalized understanding, agreement, & stated she will sign her own admission paperwork.       Will continue to follow.

## 2024-11-01 NOTE — PROGRESS NOTES
[facility] ID note    Patient afebrile.  WBC 9.9.  On vancomycin and piperacillin tazobactam.  Moved out of ICU    Blood cultures no growth  No wound cultures obtained    Continue antibiotics.  Target length of therapy to be determined but likely reassess in 8-10 days from initiation of current regimen    Monitor at a distance    Please call if any questions   Juarez Gamboa  [facility] ID  [phone]

## 2024-11-01 NOTE — PLAN OF CARE
"  Problem: Adult Inpatient Plan of Care  Goal: Plan of Care Review  Outcome: Progressing     Problem: Adult Inpatient Plan of Care  Goal: Optimal Comfort and Wellbeing  Outcome: Progressing     Problem: Diabetes Comorbidity  Goal: Blood Glucose Level Within Targeted Range  Outcome: Progressing     Problem: Wound  Goal: Optimal Coping  Outcome: Progressing     Problem: Hemodialysis  Goal: Safe, Effective Therapy Delivery  Outcome: Progressing       .Plan of care reviewed with the patient. Scheduled medicines given and the patient tolerated well. Given Oxycodone 10 mg PO for the Lt residual stump pain. Fall and safety precautions taken and the standard interventions are in place. On Telemetry monitoring with Atrial rhythm and Atrial Fibrillation, no true "red alarms' noted on the shift. Patient's Accu Check was 182 mg/dl, covered with a unit Insulin Aspart. Pt refused to have Bipap. Notified MD on patient's BP: 90/45. Advised the patient to call for assistance. Continued monitoring the patient.   "

## 2024-11-01 NOTE — PROGRESS NOTES
[facility] Medicine  Progress Note    Patient Name: Suyapa Connelly  MRN: [id number]  Patient Class: IP- Inpatient   Admission Date: 10/28/2024  Length of Stay: 4 days  Attending Physician: Shae Starkey MD  Primary Care Provider: Vanessa Noel MD      Subjective:     Principal Problem:Cellulitis of left thigh      HPI:  Patient is a 59-year-old female with a known past medical history of hypertension, hyperlipidemia, diabetes mellitus type 2, ESRD on HD, and deconditioning that presents the ER after being on floor throughout the evening.     Patient presents after being found on the floor by a neighbor today.  Patient reports that she got out of bed to change her pad and diaper but was unable to get back into bed, staying on the floor throughout the evening.  Patient's  who normally cares for her, was arrested yesterday and is in long-term and patient was unable to care for herself.  When patient was found by neighbor on floor, EMS was called and patient was brought to the ER for further evaluation.  On arrival to ER, patient in no acute distress.  Labs essentially within normal limits.  Patient last dialyzed on Thursday and no signs of fluid overload or electrolyte abnormalities.  Patient has no family NS able to assist her at home other than her  who is currently incarcerated.  Patient admitted for social problems and hemodialysis.    Patient due to her open wounds and CT finding of her left thigh - emphysema can not rule out infection -     Overview/Hospital Course:  No notes on file    Interval History: NAEON. Patient continues to have stump pain.     Review of Systems   Constitutional:  Negative for chills and fever.   Respiratory:  Negative for shortness of breath.    Cardiovascular:  Negative for chest pain.   Gastrointestinal:  Negative for abdominal pain.   Genitourinary:  Negative for dysuria.   Musculoskeletal:  Positive for arthralgias.        Chronic stump  pain   Neurological:  Negative for headaches.   Psychiatric/Behavioral:  Negative for confusion.      Objective:     Vital Signs (Most Recent)  Temp: (P) 98.6 °F (37 °C) (10/31/24 1203)  Pulse: 79 (10/31/24 1145)  Resp: 11 (10/31/24 1145)  BP: 137/63 (10/31/24 1145)  SpO2: 96 % (10/31/24 1145)     Temperature Range Min/Max (Last 24H):  Temp:  [98.6 °F (37 °C)-99.6 °F (37.6 °C)]         Physical Exam  Vitals and nursing note reviewed.   Constitutional:       General: She is not in acute distress.     Appearance: She is well-developed. She is ill-appearing (chronically).   HENT:      Head: Normocephalic and atraumatic.   Eyes:      Conjunctiva/sclera: Conjunctivae normal.   Neck:      Vascular: No JVD.   Cardiovascular:      Rate and Rhythm: Normal rate and regular rhythm.      Heart sounds: Normal heart sounds.   Pulmonary:      Effort: Pulmonary effort is normal.       Breath sounds: Normal breath sounds.   Abdominal:      General: Bowel sounds are normal. There is no distension.      Palpations: Abdomen is soft.      Tenderness: There is no abdominal tenderness.   Musculoskeletal:      Cervical back: Neck supple.      Right lower leg: No edema.      Left lower leg: No edema.      Comments: B/L AKA  R stump swelling, L stump with gauze    Neurological:      Mental Status: alert    Psychiatric:         Mood and Affect: Affect is tearful.         Behavior: Behavior normal.             Significant Labs: All pertinent labs within the past 24 hours have been reviewed.  CBC:   Recent Labs   Lab 10/31/24  0536   WBC 9.17   HGB 9.5*   HCT 29.7*        CMP:   Recent Labs   Lab 10/31/24  0536   *   K 4.2   CL 93*   CO2 27   *   BUN 19   CREATININE 2.9*   CALCIUM 8.7   PROT 7.8   ALBUMIN 2.0*   BILITOT 0.8   ALKPHOS 568*   *   ALT 37   ANIONGAP 11       Significant Imaging:  no new    Assessment/Plan:      * Cellulitis of left thigh  L AKA- necrotic amputation surgical site- developing eschar  9x15x0.2cm and blisters to periwound  Continue IV antibiotics   General surgery consulted for wound debridement. 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 Morbid obesity complicates all aspects of disease management from diagnostic modalities to treatment.         S/P AKA (above knee amputation) bilateral  Fall precautions     Septic shock  This patient has shock. The type of shock is distributive due to sepsis. She was admitted to the intensive care unit 10/29. Stepped down 10/31.   Continue IV abx.    Patient did not require IVF resuscitation or pressors. BP recovered, but still soft at times.     Chronic combined systolic and diastolic congestive heart failure  Patient has Combined Systolic and Diastolic heart failure that is Chronic. On presentation their CHF was well compensated. Most recent BNP and echo results are listed below.  Recent Labs     10/29/24  0853   BNP 1,478*     Latest ECHO  Results for orders placed during the hospital encounter of 05/07/24    Echo    Interpretation Summary    Left Ventricle: The left ventricle is mildly dilated. Ventricular mass is normal. Normal wall thickness. Severe global hypokinesis present. There is severely reduced systolic function. Ejection fraction by visual approximation is 15%. There is diastolic dysfunction.    Right Ventricle: Severe right ventricular enlargement. Wall thickness is normal. Right ventricle wall motion has global hypokinesis. Systolic function is mildly reduced.    Left Atrium: Left atrium is severely dilated. There is an atrial septal aneurysm.    Right Atrium: Right atrium is dilated.    Aortic Valve: There is mild aortic  regurgitation.    Mitral Valve: There is mild regurgitation.    Tricuspid Valve: There is annular dilation present. There is normal leaflet mobility. There is severe functional regurgitation.    Pulmonary Artery: The estimated pulmonary artery systolic pressure is at least 24 mmHg. PASP is likely under-estimated in the setting of severe tricuspid regurgitation.    IVC/SVC: Central venous pressure of at least 8 mmHg.    No vegetation seen.    Current Heart Failure Medications       Plan  - Monitor strict I&Os and daily weights.    - Place on telemetry  - Low sodium diet  - Place on fluid restriction of 1 L.   - Cardiology has not been consulted  - The patient's volume status is stable but not at their baseline as indicated by edema- UF with HD as tolerated. Lasix DC'd 10/30 due to low BP      Peripheral vascular disease  On Eliquis/ plavix      VTE Risk Mitigation (From admission, onward)           Ordered     apixaban tablet 5 mg  2 times daily         10/28/24 2114     IP VTE HIGH RISK PATIENT  Once         10/28/24 2114     Place sequential compression device  Until discontinued         10/28/24 2114     Reason for No Pharmacological VTE Prophylaxis  Once        Question:  Reasons:  Answer:  Already adequately anticoagulated on oral Anticoagulants    10/28/24 2114     heparin (porcine) injection 3,600 Units  As needed (PRN)         10/28/24 2114                    Discharge Planning   DEVAN: 11/1/2024     Code Status: Full Code   Is the patient medically ready for discharge?:     Reason for patient still in hospital (select all that apply): Patient trending condition, Treatment, and Consult recommendations  Discharge Plan A: Skilled Nursing Facility        TTF    Shae Starkey MD  Department of Hospital Medicine   [facility] - Intensive Care

## 2024-11-01 NOTE — PLAN OF CARE
Problem: Physical Therapy  Goal: Physical Therapy Goal  Description: Goals to be met by: 24     Patient will increase functional independence with mobility by performin. Supine to sit with Moderate Assistance  2. Sit to supine with Moderate Assistance  3. Rolling to Left and Right with Minimal Assistance.  4. Bed to chair transfer with Moderate Assistance using Slideboard  5. Wheelchair propulsion x 25 feet with Minimal Assistance using bilateral upper extremities    Outcome: Progressing     PT Eval completed, note to follow. Recommending moderate intensity therapy due to change in functional mobility from baseline and increased risk for falls.

## 2024-11-01 NOTE — PT/OT/SLP EVAL
Physical Therapy Evaluation    Patient Name:  Suyapa Connelly   MRN:  [id number]    Recommendations:     Discharge Recommendations: Moderate Intensity Therapy   Discharge Equipment Recommendations: to be determined by next level of care, lift device   Barriers to discharge: Decreased caregiver support and high fall risk    Assessment:     Suyapa Connelly is a 59 y.o. female admitted with a medical diagnosis of Cellulitis of left thigh.  She presents with the following impairments/functional limitations: impaired balance, weakness, impaired endurance, impaired self care skills, impaired functional mobility, decreased lower extremity function, gait instability, decreased upper extremity function, decreased ROM, pain, impaired skin, decreased safety awareness .Recommending moderate intensity therapy due to change in functional mobility from baseline and increased risk for falls.    Rehab Prognosis: Fair; patient would benefit from acute skilled PT services to address these deficits and reach maximum level of function.    Recent Surgery: * No surgery found *      Plan:     During this hospitalization, patient to be seen 3 x/week to address the identified rehab impairments via therapeutic activities, therapeutic exercises, neuromuscular re-education, wheelchair management/training and progress toward the following goals:    Plan of Care Expires:  12/01/24    Subjective     Chief Complaint: pain  Patient/Family Comments/goals: reduce pain, pt agreeable to therapy but tearful due to pain  Pain/Comfort:  Pain Rating 1:  (significant pain, tearful but did not rate)  Location - Side 1: Bilateral  Location 1:  (residual limbs/thighs)  Pain Addressed 1: Reposition, Distraction, Cessation of Activity, Nurse notified (nsg notified of pt requesting pain medication)    Patients cultural, spiritual, Lutheran conflicts given the current situation: no    Living Environment:  Pt was too emotional / tearful to speak much to  therapists; per chart:  Pt is independent with some ADL's. However, she fell at home while trying to do some of her ADL's. She lives at home with spouse who is currently incarcerated. Has no other caregiver. Goes to dialysis 3 x a week. Has Tyrell AKA. She has a wheelchair, no HH services. Equipment used at home: wheelchair, slide board.  DME owned (not currently used): none.  Upon discharge, patient will have assistance from unknown.    Objective:     Communicated with nsg prior to session.  Patient found HOB elevated with telemetry (LUE HD cath)  upon PT entry to room.    General Precautions: Standard, fall, droplet, contact, airborne  Orthopedic Precautions:N/A   Braces: N/A  Respiratory Status: Room air    Exams:  Cognitive Exam:  Patient is oriented to Person, Place, and too emotional/tearful to talk much  Postural Exam:  Patient presented with the following abnormalities:    -       Rounded shoulders  Skin Integrity/Edema:      -       Skin integrity: Wound L residual limb with dressing intact, B AKA, R residual limb scar healed  -       Edema: Mild L thigh  Pt able to minimally flex / move B hips (L>R) due to pain    Functional Mobility:  Bed Mobility:     Rolling Left:  maximal assistance and total assistance  Scooting: maximal assistance, total assistance, and of 2 persons  Supine to Sit: maximal assistance, total assistance, and of 2 persons  Sit to Supine: maximal assistance, total assistance, and of 2 persons      AM-PAC 6 CLICK MOBILITY  Total Score:7       Treatment & Education:  Pt educated on role of PT/POC and pt agreeable to participate in therapy session with max education and encouragement  Pt significantly limited by pain in B thighs and attempted sitting EOB without success  Pt attempted transfer then unable to tolerate or achieve upright sitting due to pain and returned supine   Pt scooted via draw sheet and repositioned  Pt noted to be soiled in BM at end of session, PCT notified for  hygiene    Patient left HOB elevated with all lines intact, call button in reach, bed alarm on, nsg notified, and PCT present.    GOALS:   Multidisciplinary Problems       Physical Therapy Goals          Problem: Physical Therapy    Goal Priority Disciplines Outcome Interventions   Physical Therapy Goal     PT, PT/OT Progressing    Description: Goals to be met by: 24     Patient will increase functional independence with mobility by performin. Supine to sit with Moderate Assistance  2. Sit to supine with Moderate Assistance  3. Rolling to Left and Right with Minimal Assistance.  4. Bed to chair transfer with Moderate Assistance using Slideboard  5. Wheelchair propulsion x 25 feet with Minimal Assistance using bilateral upper extremities                         History:     Past Medical History:   Diagnosis Date    Anxiety     Chronic pain syndrome     CKD (chronic kidney disease), stage III     Depression     Diabetes mellitus, type 2     GERD (gastroesophageal reflux disease)     Hyperemesis 2021    Hypokalemia 2021    Infection of below knee amputation stump 2022    Osteomyelitis     Osteomyelitis of left foot 2021    Ulcer of left foot     Vaginal delivery     x1       Past Surgical History:   Procedure Laterality Date    ABOVE-KNEE AMPUTATION Left 2021    Procedure: AMPUTATION, ABOVE KNEE;  Surgeon: Teddy Huber MD;  Location: [address];  Service: Vascular;  Laterality: Left;    ABOVE-KNEE AMPUTATION Right 3/18/2022    Procedure: AMPUTATION, ABOVE KNEE;  Surgeon: DAYNE Florez II, MD;  Location: [facility] OR [address] [place] [address];  Service: Vascular;  Laterality: Right;    Angiogram - Right Extremity Right 7/9/15    angiogram-left leg  10/6/15    ANGIOGRAPHY OF LOWER EXTREMITY Left 2021    Procedure: ANGIOGRAM, LOWER EXTREMITY;  Surgeon: Teddy Huber MD;  Location: [address];  Service: Vascular;  Laterality: Left;    BELOW KNEE AMPUTATION OF LOWER  EXTREMITY Right 12/28/2021    Procedure: AMPUTATION, BELOW KNEE;  Surgeon: Kaitlyn Rojas MD;  Location: [facility] OR;  Service: General;  Laterality: Right;    CATHETERIZATION OF BOTH LEFT AND RIGHT HEART N/A 12/18/2019    Procedure: CATHETERIZATION, HEART, BOTH LEFT AND RIGHT;  Surgeon: Que Fernando III, MD;  Location: [facility] CATH LAB;  Service: Cardiology;  Laterality: N/A;    CORONARY ANGIOGRAPHY N/A 12/18/2019    Procedure: ANGIOGRAM, CORONARY ARTERY;  Surgeon: Que Fernando III, MD;  Location: [facility] CATH LAB;  Service: Cardiology;  Laterality: N/A;    CORONARY ANGIOGRAPHY INCLUDING BYPASS GRAFTS WITH CATHETERIZATION OF LEFT HEART N/A 7/28/2020    Procedure: ANGIOGRAM, CORONARY, INCLUDING BYPASS GRAFT, WITH LEFT HEART CATHETERIZATION, 9 am;  Surgeon: Rachel Easley MD;  Location: [facility] CATH LAB;  Service: Cardiology;  Laterality: N/A;    CORONARY ARTERY BYPASS GRAFT (CABG) N/A 1/14/2020    Procedure: CORONARY ARTERY BYPASS GRAFT (CABG) x 1     Off Pump;  Surgeon: Huang Altamirano MD;  Location: [facility] OR [address] [place] [address];  Service: Cardiovascular;  Laterality: N/A;    CREATION OF FEMORAL-TIBIAL ARTERY BYPASS Left 4/29/2021    Procedure: CREATION, BYPASS, ARTERIAL, FEMORAL TO ANTERIOR TIBIAL;  Surgeon: Teddy Huber MD;  Location: [facility] OR [address] [place] [address];  Service: Vascular;  Laterality: Left;    CREATION OF FEMOROPOPLITEAL ARTERIAL BYPASS USING GRAFT Left 8/18/2020    Procedure: CREATION, BYPASS, ARTERIAL, FEMORAL TO POPLITEAL, USING GRAFT, LEFT LOWER EXTREMITY;  Surgeon: Teddy Huber MD;  Location: [facility];  Service: Vascular;  Laterality: Left;  REQUEST 7:15 A.M. START----COVID NEGATIVE ON 8/17  1ST CASE STARTE PER LEANA ON 8/7/2020 @ 942AM-LO  RN PREOP 8/12/2020   T/S-----CLEARED BY CARDS-------PENDING INSURANCE    DEBRIDEMENT OF FOOT Left 9/8/2020    Procedure: DEBRIDEMENT, FOOT;  Surgeon: Rosio Mayes DPM;  Location: [facility];  Service: Podiatry;  Laterality: Left;  request neoxx .   RN Pre Op  9-4-2020, Covid negative on 9/5/20. C A    DEBRIDEMENT OF FOOT  3/4/2021    Procedure: DEBRIDEMENT, FOOT;  Surgeon: Teddy Huber MD;  Location: [facility] OR;  Service: Vascular;;    DEBRIDEMENT OF FOOT Left 3/9/2021    Procedure: DEBRIDEMENT, FOOT, bone biopsy;  Surgeon: Rosio Mayes DPM;  Location: [facility] OR;  Service: Podiatry;  Laterality: Left;  Request neoxx---COVID IN AM  REQUESTING NOON START  RN Phone Pre op.On Blood thinners Plavix and Eliquis.  Covid am of surgery. C A    DEBRIDEMENT OF FOOT Left 5/4/2021    Procedure: DEBRIDEMENT, FOOT;  Surgeon: Farooq Morley DPM;  Location: [address];  Service: Podiatry;  Laterality: Left;    INSERTION OF TUNNELED CENTRAL VENOUS HEMODIALYSIS CATHETER N/A 1/27/2020    Procedure: Insertion, Catheter, Central Venous, Hemodialysis;  Surgeon: ESTEBAN Gomez III, MD;  Location: [facility] CATH LAB;  Service: Peripheral Vascular;  Laterality: N/A;    INSERTION OF TUNNELED CENTRAL VENOUS HEMODIALYSIS CATHETER  5/10/2023    Procedure: Insertion, Catheter, Central Venous, Hemodialysis;  Surgeon: Romulo Queen MD;  Location: [facility] CATH LAB;  Service: Cardiology;;    PERCUTANEOUS TRANSLUMINAL ANGIOPLASTY N/A 3/4/2021    Procedure: PTA (ANGIOPLASTY, PERCUTANEOUS, TRANSLUMINAL);  Surgeon: Teddy Huber MD;  Location: [facility] OR;  Service: Vascular;  Laterality: N/A;    REMOVAL OF ARTERIOVENOUS GRAFT Left 5/27/2021    Procedure: REMOVAL, GRAFT, LEFT LOWER EXTREMITY, WOUND EXPLORATION;  Surgeon: Teddy Huber MD;  [address];  Service: Vascular;  Laterality: Left;    REMOVAL OF NAIL OF DIGIT Left 3/9/2021    Procedure: REMOVAL, NAIL, DIGIT;  Surgeon: Rosio Mayes DPM;  Location: [facility] OR;  Service: Podiatry;  Laterality: Left;    RIGHT HEART CATHETERIZATION Right 5/10/2023    Procedure: INSERTION, CATHETER, RIGHT HEART;  Surgeon: Romulo Queen MD;  Location: [facility] CATH LAB;  Service: Cardiology;  Laterality: Right;    THROMBECTOMY Left  3/4/2021    Procedure: THROMBECTOMY, LEFT LOWER EXTREMITY BYPASS GRAFT, ANGIOGRAM, POSSIBLE INTERVENTION, POSSIBLE LEFT LOWER EXTREMITY BYPASS;  Surgeon: Teddy Huber MD;  Location: [facility] OR;  Service: Vascular;  Laterality: Left;    THROMBECTOMY Left 4/29/2021    Procedure: GRAFT THROMBECTOMY, LEFT LOWER EXTREMITY;  Surgeon: Teddy Huber MD;  [facility];  Service: Vascular;  Laterality: Left;  14.5 min  1179.85 mGy  341.01 Gycm2  240 ml dye    THROMBECTOMY  10/22/2021    Procedure: THROMBECTOMY;  Surgeon: Saad Arenas MD;  Location: [facility] CATH LAB/EP;  Service: Cardiology;;       Time Tracking:     PT Received On: 11/01/24  PT Start Time: 1105     PT Stop Time: 1131  PT Total Time (min): 26 min Overlap with OT for portions of session due to complex nature of patient and for safety with mobility to decrease fall risk for patient and caregiver injury requiring two skilled therapists to provide different interventions.      Billable Minutes: Evaluation 10 and Therapeutic Activity 16      11/01/2024

## 2024-11-02 LAB
ALBUMIN SERPL BCP-MCNC: 1.7 G/DL (ref 3.5–5.2)
ALP SERPL-CCNC: 636 U/L (ref 40–150)
ALT SERPL W/O P-5'-P-CCNC: 32 U/L (ref 10–44)
ANION GAP SERPL CALC-SCNC: 13 MMOL/L (ref 8–16)
AST SERPL-CCNC: 77 U/L (ref 10–40)
BASOPHILS # BLD AUTO: 0.07 K/UL (ref 0–0.2)
BASOPHILS # BLD AUTO: 0.1 K/UL (ref 0–0.2)
BASOPHILS NFR BLD: 0.7 % (ref 0–1.9)
BASOPHILS NFR BLD: 0.9 % (ref 0–1.9)
BILIRUB SERPL-MCNC: 0.7 MG/DL (ref 0.1–1)
BUN SERPL-MCNC: 29 MG/DL (ref 6–20)
CALCIUM SERPL-MCNC: 8.6 MG/DL (ref 8.7–10.5)
CHLORIDE SERPL-SCNC: 92 MMOL/L (ref 95–110)
CO2 SERPL-SCNC: 25 MMOL/L (ref 23–29)
CREAT SERPL-MCNC: 3.6 MG/DL (ref 0.5–1.4)
DIFFERENTIAL METHOD BLD: ABNORMAL
DIFFERENTIAL METHOD BLD: ABNORMAL
EOSINOPHIL # BLD AUTO: 0.2 K/UL (ref 0–0.5)
EOSINOPHIL # BLD AUTO: 0.3 K/UL (ref 0–0.5)
EOSINOPHIL NFR BLD: 1.3 % (ref 0–8)
EOSINOPHIL NFR BLD: 2.4 % (ref 0–8)
ERYTHROCYTE [DISTWIDTH] IN BLOOD BY AUTOMATED COUNT: 17.6 % (ref 11.5–14.5)
ERYTHROCYTE [DISTWIDTH] IN BLOOD BY AUTOMATED COUNT: 17.8 % (ref 11.5–14.5)
EST. GFR  (NO RACE VARIABLE): 14 ML/MIN/1.73 M^2
GLUCOSE SERPL-MCNC: 101 MG/DL (ref 70–110)
HCT VFR BLD AUTO: 20.7 % (ref 37–48.5)
HCT VFR BLD AUTO: 29.5 % (ref 37–48.5)
HGB BLD-MCNC: 6.6 G/DL (ref 12–16)
HGB BLD-MCNC: 9.4 G/DL (ref 12–16)
IMM GRANULOCYTES # BLD AUTO: 0.08 K/UL (ref 0–0.04)
IMM GRANULOCYTES # BLD AUTO: 0.09 K/UL (ref 0–0.04)
IMM GRANULOCYTES NFR BLD AUTO: 0.7 % (ref 0–0.5)
IMM GRANULOCYTES NFR BLD AUTO: 0.8 % (ref 0–0.5)
LYMPHOCYTES # BLD AUTO: 0.7 K/UL (ref 1–4.8)
LYMPHOCYTES # BLD AUTO: 1.3 K/UL (ref 1–4.8)
LYMPHOCYTES NFR BLD: 10.7 % (ref 18–48)
LYMPHOCYTES NFR BLD: 6.1 % (ref 18–48)
MAGNESIUM SERPL-MCNC: 2.1 MG/DL (ref 1.6–2.6)
MCH RBC QN AUTO: 32.9 PG (ref 27–31)
MCH RBC QN AUTO: 33.7 PG (ref 27–31)
MCHC RBC AUTO-ENTMCNC: 31.9 G/DL (ref 32–36)
MCHC RBC AUTO-ENTMCNC: 31.9 G/DL (ref 32–36)
MCV RBC AUTO: 103 FL (ref 82–98)
MCV RBC AUTO: 106 FL (ref 82–98)
MONOCYTES # BLD AUTO: 1.2 K/UL (ref 0.3–1)
MONOCYTES # BLD AUTO: 1.6 K/UL (ref 0.3–1)
MONOCYTES NFR BLD: 10.9 % (ref 4–15)
MONOCYTES NFR BLD: 13.3 % (ref 4–15)
NEUTROPHILS # BLD AUTO: 8.5 K/UL (ref 1.8–7.7)
NEUTROPHILS # BLD AUTO: 8.6 K/UL (ref 1.8–7.7)
NEUTROPHILS NFR BLD: 73 % (ref 38–73)
NEUTROPHILS NFR BLD: 79.2 % (ref 38–73)
NRBC BLD-RTO: 0 /100 WBC
NRBC BLD-RTO: 0 /100 WBC
PHOSPHATE SERPL-MCNC: 3.1 MG/DL (ref 2.7–4.5)
PLATELET # BLD AUTO: 299 K/UL (ref 150–450)
PLATELET # BLD AUTO: 318 K/UL (ref 150–450)
PMV BLD AUTO: 11.2 FL (ref 9.2–12.9)
PMV BLD AUTO: 11.4 FL (ref 9.2–12.9)
POCT GLUCOSE: 109 MG/DL (ref 70–110)
POCT GLUCOSE: 121 MG/DL (ref 70–110)
POCT GLUCOSE: 136 MG/DL (ref 70–110)
POCT GLUCOSE: 172 MG/DL (ref 70–110)
POCT GLUCOSE: 209 MG/DL (ref 70–110)
POTASSIUM SERPL-SCNC: 4.1 MMOL/L (ref 3.5–5.1)
PROT SERPL-MCNC: 7.8 G/DL (ref 6–8.4)
RBC # BLD AUTO: 1.96 M/UL (ref 4–5.4)
RBC # BLD AUTO: 2.86 M/UL (ref 4–5.4)
SODIUM SERPL-SCNC: 130 MMOL/L (ref 136–145)
VANCOMYCIN SERPL-MCNC: 21.8 UG/ML
WBC # BLD AUTO: 10.69 K/UL (ref 3.9–12.7)
WBC # BLD AUTO: 11.7 K/UL (ref 3.9–12.7)

## 2024-11-02 PROCEDURE — 85025 COMPLETE CBC W/AUTO DIFF WBC: CPT | Mod: 91,HCNC

## 2024-11-02 PROCEDURE — 25000003 PHARM REV CODE 250: Mod: HCNC

## 2024-11-02 PROCEDURE — 63600175 PHARM REV CODE 636 W HCPCS: Mod: HCNC | Performed by: FAMILY MEDICINE

## 2024-11-02 PROCEDURE — 25000003 PHARM REV CODE 250: Mod: HCNC | Performed by: INTERNAL MEDICINE

## 2024-11-02 PROCEDURE — 63600175 PHARM REV CODE 636 W HCPCS: Mod: HCNC | Performed by: INTERNAL MEDICINE

## 2024-11-02 PROCEDURE — 94761 N-INVAS EAR/PLS OXIMETRY MLT: CPT | Mod: HCNC

## 2024-11-02 PROCEDURE — 80100016 HC MAINTENANCE HEMODIALYSIS: Mod: HCNC

## 2024-11-02 PROCEDURE — 80053 COMPREHEN METABOLIC PANEL: CPT | Mod: HCNC | Performed by: INTERNAL MEDICINE

## 2024-11-02 PROCEDURE — 84100 ASSAY OF PHOSPHORUS: CPT | Mod: HCNC | Performed by: INTERNAL MEDICINE

## 2024-11-02 PROCEDURE — 36415 COLL VENOUS BLD VENIPUNCTURE: CPT | Mod: HCNC

## 2024-11-02 PROCEDURE — 30233N1 TRANSFUSION OF NONAUTOLOGOUS RED BLOOD CELLS INTO PERIPHERAL VEIN, PERCUTANEOUS APPROACH: ICD-10-PCS

## 2024-11-02 PROCEDURE — 85025 COMPLETE CBC W/AUTO DIFF WBC: CPT | Mod: HCNC | Performed by: INTERNAL MEDICINE

## 2024-11-02 PROCEDURE — 11000001 HC ACUTE MED/SURG PRIVATE ROOM: Mod: HCNC

## 2024-11-02 PROCEDURE — 80202 ASSAY OF VANCOMYCIN: CPT | Mod: HCNC | Performed by: FAMILY MEDICINE

## 2024-11-02 PROCEDURE — 80100014 HC HEMODIALYSIS 1:1: Mod: HCNC

## 2024-11-02 PROCEDURE — 83735 ASSAY OF MAGNESIUM: CPT | Mod: HCNC | Performed by: INTERNAL MEDICINE

## 2024-11-02 PROCEDURE — 27000207 HC ISOLATION: Mod: HCNC

## 2024-11-02 PROCEDURE — 99900035 HC TECH TIME PER 15 MIN (STAT): Mod: HCNC

## 2024-11-02 RX ORDER — HYDROMORPHONE HYDROCHLORIDE 1 MG/ML
0.2 INJECTION, SOLUTION INTRAMUSCULAR; INTRAVENOUS; SUBCUTANEOUS ONCE
Status: COMPLETED | OUTPATIENT
Start: 2024-11-03 | End: 2024-11-02

## 2024-11-02 RX ORDER — HYDROCODONE BITARTRATE AND ACETAMINOPHEN 500; 5 MG/1; MG/1
TABLET ORAL
Status: DISCONTINUED | OUTPATIENT
Start: 2024-11-03 | End: 2024-11-03

## 2024-11-02 RX ADMIN — APIXABAN 5 MG: 5 TABLET, FILM COATED ORAL at 08:11

## 2024-11-02 RX ADMIN — SODIUM THIOSULFATE 12.5 G: 250 INJECTION, SOLUTION INTRAVENOUS at 01:11

## 2024-11-02 RX ADMIN — PREGABALIN 50 MG: 50 CAPSULE ORAL at 08:11

## 2024-11-02 RX ADMIN — APIXABAN 5 MG: 5 TABLET, FILM COATED ORAL at 09:11

## 2024-11-02 RX ADMIN — ATORVASTATIN CALCIUM 80 MG: 40 TABLET, FILM COATED ORAL at 08:11

## 2024-11-02 RX ADMIN — PIPERACILLIN SODIUM AND TAZOBACTAM SODIUM 4.5 G: 4; .5 INJECTION, POWDER, LYOPHILIZED, FOR SOLUTION INTRAVENOUS at 05:11

## 2024-11-02 RX ADMIN — MUPIROCIN: 20 OINTMENT TOPICAL at 08:11

## 2024-11-02 RX ADMIN — HEPARIN SODIUM 3600 UNITS: 1000 INJECTION, SOLUTION INTRAVENOUS; SUBCUTANEOUS at 01:11

## 2024-11-02 RX ADMIN — CINACALCET HYDROCHLORIDE 30 MG: 30 TABLET, FILM COATED ORAL at 08:11

## 2024-11-02 RX ADMIN — CLOPIDOGREL BISULFATE 75 MG: 75 TABLET ORAL at 08:11

## 2024-11-02 RX ADMIN — INSULIN ASPART 2 UNITS: 100 INJECTION, SOLUTION INTRAVENOUS; SUBCUTANEOUS at 09:11

## 2024-11-02 RX ADMIN — SERTRALINE HYDROCHLORIDE 100 MG: 100 TABLET ORAL at 08:11

## 2024-11-02 RX ADMIN — OXYCODONE HYDROCHLORIDE 10 MG: 10 TABLET, FILM COATED, EXTENDED RELEASE ORAL at 04:11

## 2024-11-02 RX ADMIN — HYDROMORPHONE HYDROCHLORIDE 0.2 MG: 1 INJECTION, SOLUTION INTRAMUSCULAR; INTRAVENOUS; SUBCUTANEOUS at 11:11

## 2024-11-02 RX ADMIN — KETOROLAC TROMETHAMINE 15 MG: 30 INJECTION, SOLUTION INTRAMUSCULAR; INTRAVENOUS at 06:11

## 2024-11-02 RX ADMIN — INSULIN GLARGINE 5 UNITS: 100 INJECTION, SOLUTION SUBCUTANEOUS at 09:11

## 2024-11-02 RX ADMIN — INSULIN ASPART 2 UNITS: 100 INJECTION, SOLUTION INTRAVENOUS; SUBCUTANEOUS at 01:11

## 2024-11-02 RX ADMIN — PIPERACILLIN SODIUM AND TAZOBACTAM SODIUM 4.5 G: 4; .5 INJECTION, POWDER, LYOPHILIZED, FOR SOLUTION INTRAVENOUS at 04:11

## 2024-11-02 NOTE — PROGRESS NOTES
11/02/24 1348 11/02/24 1352   Vital Signs   Temp  --  98.6 °F (37 °C)   Pulse 60 80   Resp  --  20   /88 122/74   Pre-Hemodialysis Assessment   Treatment Status Completed  --    During Hemodialysis Assessment   Blood Flow Rate (mL/min) 300 mL/min  --    Dialysate Flow Rate (mL/min) 600 ml/min  --    Ultrafiltration Rate (mL/Hr) 855 mL/Hr  --    Arteriovenous Lines Secure Yes  --    Arterial Pressure (mmHg) -140 mmHg  --    Venous Pressure (mmHg) 134  --    Blood Volume Processed (Liters) 0 L  --    UF Removed (mL) 2550 mL  --    TMP 7  --    Venous Line in Air Detector Yes  --    Intake (mL) 0 mL  --    Transducer Dry Yes  --    Access Visible Yes  --     notified of access issue? N/A  --    Heparin given? N/A  --    Intra-Hemodialysis Comments tx complete  --    Post-Hemodialysis Assessment   Rinseback Volume (mL) 300 mL  --    Blood Volume Processed (Liters) 54 L  --    Dialyzer Clearance Lightly streaked  --    Duration of Treatment 180 minutes  --    Additional Fluid Intake (mL) 550 mL  --    Total UF (mL) 2550 mL  --    Net Fluid Removal 2000  --    Patient Response to Treatment Pt ann. well  --    Post-Hemodialysis Comments lines flushed blood returned  --

## 2024-11-02 NOTE — PLAN OF CARE
1400  CM met with the pt's mother, Tea Sal ([phone]), & cousin, Jen Arias ([phone]), outside of the pt's room as requested. CM informed both that the pt has been accepted to [facility] pending ins auth & HD chair transfer. Both verbalized understanding, agreement, & appreciation.       Will continue to follow.

## 2024-11-02 NOTE — PROGRESS NOTES
Pharmacokinetic Assessment Follow Up: IV Vancomycin    Vancomycin serum concentration assessment(s):    The random level was drawn correctly and can be used to guide therapy at this time. The measurement is above the desired definitive target range of 10 to 15 mcg/mL.    Vancomycin Regimen Plan:    Hold off on vanc dosing until random level < 20 ug/mL.   Re-dose when the random level is less than 20 mcg/mL, next level to be drawn at 0400 on 11/3  Will follow up daily for HD plan.     Drug levels (last 3 results):  Recent Labs   Lab Result Units 10/31/24  0536 11/01/24  0350 11/02/24  0534   Vancomycin, Random ug/mL 22.5 25.0 21.8       Pharmacy will continue to follow and monitor vancomycin.    Please contact pharmacy at extension [phone] for questions regarding this assessment.    Thank you for the consult,   Balbina Hodges       Patient brief summary:  Suyapa Connelly is a 59 y.o. female initiated on antimicrobial therapy with IV Vancomycin for treatment of skin & soft tissue infection    The patient's current regimen is pulse dosing (HD)    Drug Allergies:   Review of patient's allergies indicates:   Allergen Reactions    Ciprofloxacin Itching    Iodine      Kidney injury    Pcn [penicillins]      Rash; tolerated ceftriaxone on 1/13/20       Actual Body Weight:   68 kg    Renal Function:   Estimated Creatinine Clearance: 12.5 mL/min (A) (based on SCr of 3.6 mg/dL (H)).,     Dialysis Method (if applicable[facility]    CBC (last 72 hours):  Recent Labs   Lab Result Units 10/31/24  0536 11/01/24  0350 11/02/24  0534   WBC K/uL 9.17 9.93 10.69   Hemoglobin g/dL 9.5* 9.4* 9.4*   Hematocrit % 29.7* 29.3* 29.5*   Platelets K/uL 267 299 318   Gran % % 71.2 80.2* 79.2*   Lymph % % 6.7* 4.7* 6.1*   Mono % % 18.8* 12.8 10.9   Eosinophil % % 2.4 1.1 2.4   Basophil % % 0.4 0.5 0.7   Differential Method  Automated Automated Automated       Metabolic Panel (last 72 hours):  Recent Labs   Lab Result Units  10/31/24  0536 11/01/24  0351 11/02/24  0534   Sodium mmol/L 131* 133* 130*   Potassium mmol/L 4.2 3.9 4.1   Chloride mmol/L 93* 93* 92*   CO2 mmol/L 27 27 25   Glucose mg/dL 112* 188* 101   BUN mg/dL 19 18 29*   Creatinine mg/dL 2.9* 2.6* 3.6*   Albumin g/dL 2.0* 2.0* 1.7*   Total Bilirubin mg/dL 0.8 0.8 0.7   Alkaline Phosphatase U/L 568* 766* 636*   AST U/L 112* 118* 77*   ALT U/L 37 41 32   Magnesium mg/dL 1.9 1.9 2.1   Phosphorus mg/dL 3.1 2.2* 3.1       Vancomycin Administrations:  vancomycin given in the last 96 hours                     vancomycin (VANCOCIN) 500 mg in D5W 100 mL IVPB (MB+) (mg) 500 mg New Bag 10/31/24 1350    vancomycin (VANCOCIN) 500 mg in D5W 100 mL IVPB (MB+) (mg) 500 mg New Bag 10/30/24 2008    vancomycin (VANCOCIN) 500 mg in D5W 100 mL IVPB (MB+) (mg) 500 mg New Bag 10/29/24 2018                    Microbiologic Results:  Microbiology Results (last 7 days)       Procedure Component Value Units Date/Time    Blood culture [[id number]] Collected: 10/29/24 0945    Order Status: Completed Specimen: Blood Updated: 11/01/24 1612     Blood Culture, Routine No Growth to date      No Growth to date      No Growth to date      No Growth to date    Narrative:      Draw sample # 2 from separate site    Blood culture [[id number]] Collected: 10/29/24 0945    Order Status: Completed Specimen: Blood Updated: 11/01/24 1612     Blood Culture, Routine No Growth to date      No Growth to date      No Growth to date      No Growth to date    Narrative:      Draw sample # 2 from separate site    Respiratory Infection Panel (PCR), Nasopharyngeal [[id number]]  (Abnormal) Collected: 10/29/24 1535    Order Status: Completed Specimen: Nasopharyngeal Swab Updated: 10/30/24 0048     Respiratory Infection Panel Source NP Swab     Adenovirus Not Detected     Coronavirus 229E, Common Cold Virus Not Detected     Coronavirus HKU1, Common Cold Virus Not Detected     Coronavirus NL63, Common Cold Virus Not Detected      Coronavirus OC43, Common Cold Virus Not Detected     Comment: The Coronavirus strains detected in this test cause the common cold.  These strains are not the COVID-19 (novel Coronavirus)strain   associated with the respiratory disease outbreak.          SARS-CoV2 (COVID-19) Qualitative PCR Detected     Human Metapneumovirus Not Detected     Human Rhinovirus/Enterovirus Not Detected     Influenza A (subtypes H1, H1-2009,H3) Not Detected     Influenza B Not Detected     Parainfluenza Virus 1 Not Detected     Parainfluenza Virus 2 Not Detected     Parainfluenza Virus 3 Not Detected     Parainfluenza Virus 4 Not Detected     Respiratory Syncytial Virus Not Detected     Bordetella Parapertussis (LW9674) Not Detected     Bordetella pertussis (ptxP) Not Detected     Chlamydia pneumoniae Not Detected     Mycoplasma pneumoniae Not Detected    Narrative:      Assay not valid for lower respiratory specimens, alternate  testing required.

## 2024-11-02 NOTE — PROGRESS NOTES
[facility] ID note    Patient afebrile.  WBC 10.7.  On vancomycin and piperacillin tazobactam.  Antibiotic day 5    Blood cultures negative    Continue present antibiotics for 8-10 days then reassess  Monitored distance    Please call if any questions   Juarez Gamboa  [facility] ID  [phone]

## 2024-11-02 NOTE — PROGRESS NOTES
[facility] Medicine  Progress Note    Patient Name: Suyapa Connelly  MRN: [id number]  Patient Class: IP- Inpatient   Admission Date: 10/28/2024  Length of Stay: 5 days  Attending Physician: Shae Starkey MD  Primary Care Provider: Vanessa Noel MD      Subjective:     Principal Problem:Cellulitis of left thigh      HPI:  Patient is a 59-year-old female with a known past medical history of hypertension, hyperlipidemia, diabetes mellitus type 2, ESRD on HD, and deconditioning that presents the ER after being on floor throughout the evening.     Patient presents after being found on the floor by a neighbor today.  Patient reports that she got out of bed to change her pad and diaper but was unable to get back into bed, staying on the floor throughout the evening.  Patient's  who normally cares for her, was arrested yesterday and is in skilled nursing and patient was unable to care for herself.  When patient was found by neighbor on floor, EMS was called and patient was brought to the ER for further evaluation.  On arrival to ER, patient in no acute distress.  Labs essentially within normal limits.  Patient last dialyzed on Thursday and no signs of fluid overload or electrolyte abnormalities.  Patient has no family NS able to assist her at home other than her  who is currently incarcerated.  Patient admitted for social problems and hemodialysis.    Patient due to her open wounds and CT finding of her left thigh - emphysema can not rule out infection -     Overview/Hospital Course:  No notes on file    Interval History: NAEON. Getting HD in room. Appears comfortable, but says her pain is not controlled. Says the pain in her stump started after falling off the bed at home recently.    Review of Systems   Constitutional:  Negative for chills and fever.   Respiratory:  Negative for shortness of breath.    Cardiovascular:  Negative for chest pain.   Gastrointestinal:  Negative for  abdominal pain.   Genitourinary:  Negative for dysuria.   Musculoskeletal:  Positive for arthralgias.   Neurological:  Negative for headaches.   Psychiatric/Behavioral:  Negative for confusion.      Objective:     Vital Signs (Most Recent):  Temp: 98.8 °F (37.1 °C) (11/02/24 1043)  Pulse: 70 (11/02/24 1248)  Resp: 20 (11/02/24 1043)  BP: 126/64 (11/02/24 1248)  SpO2: 97 % (11/02/24 0932) Vital Signs (24h Range):  Temp:  [97.9 °F (36.6 °C)-98.8 °F (37.1 °C)] 98.8 °F (37.1 °C)  Pulse:  [60-80] 70  Resp:  [16-20] 20  SpO2:  [92 %-100 %] 97 %  BP: (112-164)/(49-99) 126/64     Weight: 68 kg (150 lb)  Body mass index is 45.77 kg/m².    Intake/Output Summary (Last 24 hours) at 11/2/2024 1321  Last data filed at 11/2/2024 0901  Gross per 24 hour   Intake 125 ml   Output --   Net 125 ml         Physical Exam  Vitals and nursing note reviewed.   Constitutional:       General: She is awake. She is not in acute distress.     Appearance: She is well-developed. She is ill-appearing (chronically).   HENT:      Head: Normocephalic and atraumatic.   Eyes:      Conjunctiva/sclera: Conjunctivae normal.   Neck:      Vascular: No JVD.   Cardiovascular:      Rate and Rhythm: Normal rate and regular rhythm.      Heart sounds: Normal heart sounds.   Pulmonary:      Effort: Pulmonary effort is normal.      Breath sounds: Normal breath sounds.   Abdominal:      General: Bowel sounds are normal. There is no distension.      Palpations: Abdomen is soft.      Tenderness: There is no abdominal tenderness.   Musculoskeletal:      Cervical back: Neck supple.      Right lower leg: No edema.      Left lower leg: No edema.      Comments: B/L AKA  R stump swelling, L stump with gauze    Neurological:      Mental Status: She is alert.   Psychiatric:         Mood and Affect: Affect is flat.         Behavior: Behavior normal.             Significant Labs: All pertinent labs within the past 24 hours have been reviewed.  CBC:   Recent Labs   Lab  11/01/24  0350 11/02/24  0534   WBC 9.93 10.69   HGB 9.4* 9.4*   HCT 29.3* 29.5*    318     CMP:   Recent Labs   Lab 11/01/24  0351 11/02/24  0534   * 130*   K 3.9 4.1   CL 93* 92*   CO2 27 25   * 101   BUN 18 29*   CREATININE 2.6* 3.6*   CALCIUM 8.8 8.6*   PROT 8.6* 7.8   ALBUMIN 2.0* 1.7*   BILITOT 0.8 0.7   ALKPHOS 766* 636*   * 77*   ALT 41 32   ANIONGAP 13 13       Significant Imaging:  no new    Assessment/Plan:      * Cellulitis of left thigh  L AKA- necrotic amputation surgical site- developing eschar 9x15x0.2cm and blisters to periwound  Continue IV antibiotics   General surgery consulted for wound debridement. Recs: continue recommendations per Wound Care   - Aquacel AG daily to L AKA        ID recs:   Continue vancomycin/ piperacillin tazobactam  Given one dose of metronidazole.  Difficult to determine length of therapy at this time but related to treatment response. Unfortunately, the aspect of the ischemic lesions complicate assessment of recovery.  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  Patient is status post CABG as well as stents that have clotted in her lower extremities    Pain control-- continue current regimen  Cautious with narcs secondary to bradypnea/ decreased respiratory rate    Pressure injury of sacral region, unstageable  Wound care    - Pressure injury prevention interventions   - Triad ointment BID to sacrum/buttocks      Morbid obesity with BMI of 50.0-59.9, adult  Body mass index is 45.77 kg/m². Morbid obesity complicates all aspects of disease management from diagnostic modalities to treatment.           S/P AKA (above knee amputation) bilateral  Fall precautions     Septic shock  This patient had shock. The type of shock is distributive due to sepsis. She was admitted to the intensive care unit 10/29.   Patient did not require IVF resuscitation or pressors. BP recovered.  Stepped down 10/31.   Continue IV abx.      Chronic combined  systolic and diastolic congestive heart failure  Patient has Combined Systolic and Diastolic heart failure that is Chronic. On presentation their CHF was well compensated. Most recent BNP and echo results are listed below.  Recent Labs     10/29/24  0853   BNP 1,478*     Latest ECHO  Results for orders placed during the hospital encounter of 05/07/24    Echo    Interpretation Summary    Left Ventricle: The left ventricle is mildly dilated. Ventricular mass is normal. Normal wall thickness. Severe global hypokinesis present. There is severely reduced systolic function. Ejection fraction by visual approximation is 15%. There is diastolic dysfunction.    Right Ventricle: Severe right ventricular enlargement. Wall thickness is normal. Right ventricle wall motion has global hypokinesis. Systolic function is mildly reduced.    Left Atrium: Left atrium is severely dilated. There is an atrial septal aneurysm.    Right Atrium: Right atrium is dilated.    Aortic Valve: There is mild aortic regurgitation.    Mitral Valve: There is mild regurgitation.    Tricuspid Valve: There is annular dilation present. There is normal leaflet mobility. There is severe functional regurgitation.    Pulmonary Artery: The estimated pulmonary artery systolic pressure is at least 24 mmHg. PASP is likely under-estimated in the setting of severe tricuspid regurgitation.    IVC/SVC: Central venous pressure of at least 8 mmHg.    No vegetation seen.    Current Heart Failure Medications       Plan  - Monitor strict I&Os and daily weights.    - Place on telemetry  - Low sodium diet  - Place on fluid restriction of 1 L.   - Cardiology has not been consulted  - The patient's volume status is stable but not at their baseline as indicated by edema- UF with HD as tolerated. Lasix DC'd 10/30 due to low BP        Peripheral vascular disease  On Eliquis/ plavix      VTE Risk Mitigation (From admission, onward)           Ordered     apixaban tablet 5 mg  2 times  daily         10/28/24 2114     IP VTE HIGH RISK PATIENT  Once         10/28/24 2114     Place sequential compression device  Until discontinued         10/28/24 2114     Reason for No Pharmacological VTE Prophylaxis  Once        Question:  Reasons:  Answer:  Already adequately anticoagulated on oral Anticoagulants    10/28/24 2114     heparin (porcine) injection 3,600 Units  As needed (PRN)         10/28/24 2114                    Discharge Planning   DEVAN: 11/8/2024     Code Status: Full Code   Is the patient medically ready for discharge?:     Reason for patient still in hospital (select all that apply): Patient trending condition, Treatment, and Consult recommendations  Discharge Plan A: Skilled Nursing Facility          Shae Starkey MD  Department of Hospital Medicine   [place] - Intensive [facility]

## 2024-11-03 LAB
ABO + RH BLD: NORMAL
ALBUMIN SERPL BCP-MCNC: 1.6 G/DL (ref 3.5–5.2)
ANION GAP SERPL CALC-SCNC: 11 MMOL/L (ref 8–16)
BACTERIA BLD CULT: NORMAL
BACTERIA BLD CULT: NORMAL
BASOPHILS # BLD AUTO: 0.09 K/UL (ref 0–0.2)
BASOPHILS # BLD AUTO: 0.11 K/UL (ref 0–0.2)
BASOPHILS NFR BLD: 0.6 % (ref 0–1.9)
BASOPHILS NFR BLD: 0.7 % (ref 0–1.9)
BLD GP AB SCN CELLS X3 SERPL QL: NORMAL
BLD PROD TYP BPU: NORMAL
BLOOD UNIT EXPIRATION DATE: NORMAL
BLOOD UNIT TYPE CODE: 5100
BLOOD UNIT TYPE: NORMAL
BUN SERPL-MCNC: 30 MG/DL (ref 6–20)
CALCIUM SERPL-MCNC: 7.5 MG/DL (ref 8.7–10.5)
CHLORIDE SERPL-SCNC: 97 MMOL/L (ref 95–110)
CO2 SERPL-SCNC: 25 MMOL/L (ref 23–29)
CODING SYSTEM: NORMAL
CREAT SERPL-MCNC: 3.3 MG/DL (ref 0.5–1.4)
CROSSMATCH INTERPRETATION: NORMAL
DIFFERENTIAL METHOD BLD: ABNORMAL
DIFFERENTIAL METHOD BLD: ABNORMAL
DISPENSE STATUS: NORMAL
EOSINOPHIL # BLD AUTO: 0.1 K/UL (ref 0–0.5)
EOSINOPHIL # BLD AUTO: 0.1 K/UL (ref 0–0.5)
EOSINOPHIL NFR BLD: 0.4 % (ref 0–8)
EOSINOPHIL NFR BLD: 0.5 % (ref 0–8)
ERYTHROCYTE [DISTWIDTH] IN BLOOD BY AUTOMATED COUNT: 19 % (ref 11.5–14.5)
ERYTHROCYTE [DISTWIDTH] IN BLOOD BY AUTOMATED COUNT: 19.8 % (ref 11.5–14.5)
ERYTHROCYTE [DISTWIDTH] IN BLOOD BY AUTOMATED COUNT: 20.7 % (ref 11.5–14.5)
EST. GFR  (NO RACE VARIABLE): 16 ML/MIN/1.73 M^2
GLUCOSE SERPL-MCNC: 219 MG/DL (ref 70–110)
HCT VFR BLD AUTO: 21.5 % (ref 37–48.5)
HCT VFR BLD AUTO: 23 % (ref 37–48.5)
HCT VFR BLD AUTO: 25 % (ref 37–48.5)
HGB BLD-MCNC: 7.3 G/DL (ref 12–16)
HGB BLD-MCNC: 7.8 G/DL (ref 12–16)
HGB BLD-MCNC: 8.2 G/DL (ref 12–16)
IMM GRANULOCYTES # BLD AUTO: 0.19 K/UL (ref 0–0.04)
IMM GRANULOCYTES # BLD AUTO: 0.37 K/UL (ref 0–0.04)
IMM GRANULOCYTES NFR BLD AUTO: 1.2 % (ref 0–0.5)
IMM GRANULOCYTES NFR BLD AUTO: 2.6 % (ref 0–0.5)
LYMPHOCYTES # BLD AUTO: 0.9 K/UL (ref 1–4.8)
LYMPHOCYTES # BLD AUTO: 1 K/UL (ref 1–4.8)
LYMPHOCYTES NFR BLD: 6.3 % (ref 18–48)
LYMPHOCYTES NFR BLD: 6.5 % (ref 18–48)
MAGNESIUM SERPL-MCNC: 1.8 MG/DL (ref 1.6–2.6)
MCH RBC QN AUTO: 30.8 PG (ref 27–31)
MCH RBC QN AUTO: 31.5 PG (ref 27–31)
MCH RBC QN AUTO: 31.7 PG (ref 27–31)
MCHC RBC AUTO-ENTMCNC: 32.8 G/DL (ref 32–36)
MCHC RBC AUTO-ENTMCNC: 33.9 G/DL (ref 32–36)
MCHC RBC AUTO-ENTMCNC: 34 G/DL (ref 32–36)
MCV RBC AUTO: 93 FL (ref 82–98)
MCV RBC AUTO: 94 FL (ref 82–98)
MCV RBC AUTO: 94 FL (ref 82–98)
MONOCYTES # BLD AUTO: 1.4 K/UL (ref 0.3–1)
MONOCYTES # BLD AUTO: 1.7 K/UL (ref 0.3–1)
MONOCYTES NFR BLD: 10.1 % (ref 4–15)
MONOCYTES NFR BLD: 10.6 % (ref 4–15)
NEUTROPHILS # BLD AUTO: 11.4 K/UL (ref 1.8–7.7)
NEUTROPHILS # BLD AUTO: 12.7 K/UL (ref 1.8–7.7)
NEUTROPHILS NFR BLD: 80 % (ref 38–73)
NEUTROPHILS NFR BLD: 80.5 % (ref 38–73)
NRBC BLD-RTO: 0 /100 WBC
NRBC BLD-RTO: 0 /100 WBC
NUM UNITS TRANS PACKED RBC: NORMAL
NUM UNITS TRANS PACKED RBC: NORMAL
PHOSPHATE SERPL-MCNC: 2.9 MG/DL (ref 2.7–4.5)
PLATELET # BLD AUTO: 278 K/UL (ref 150–450)
PLATELET # BLD AUTO: 284 K/UL (ref 150–450)
PLATELET # BLD AUTO: 291 K/UL (ref 150–450)
PMV BLD AUTO: 11.2 FL (ref 9.2–12.9)
PMV BLD AUTO: 11.3 FL (ref 9.2–12.9)
PMV BLD AUTO: 11.5 FL (ref 9.2–12.9)
POCT GLUCOSE: 243 MG/DL (ref 70–110)
POCT GLUCOSE: 265 MG/DL (ref 70–110)
POCT GLUCOSE: 282 MG/DL (ref 70–110)
POCT GLUCOSE: 305 MG/DL (ref 70–110)
POTASSIUM SERPL-SCNC: 4.4 MMOL/L (ref 3.5–5.1)
RBC # BLD AUTO: 2.3 M/UL (ref 4–5.4)
RBC # BLD AUTO: 2.48 M/UL (ref 4–5.4)
RBC # BLD AUTO: 2.66 M/UL (ref 4–5.4)
SODIUM SERPL-SCNC: 133 MMOL/L (ref 136–145)
SPECIMEN OUTDATE: NORMAL
TRANS ERYTHROCYTES VOL PATIENT: NORMAL ML
VANCOMYCIN SERPL-MCNC: 15.5 UG/ML
WBC # BLD AUTO: 14.24 K/UL (ref 3.9–12.7)
WBC # BLD AUTO: 15.75 K/UL (ref 3.9–12.7)
WBC # BLD AUTO: 24.77 K/UL (ref 3.9–12.7)

## 2024-11-03 PROCEDURE — 86920 COMPATIBILITY TEST SPIN: CPT | Mod: HCNC | Performed by: FAMILY MEDICINE

## 2024-11-03 PROCEDURE — C1751 CATH, INF, PER/CENT/MIDLINE: HCPCS | Mod: HCNC

## 2024-11-03 PROCEDURE — 36415 COLL VENOUS BLD VENIPUNCTURE: CPT | Mod: HCNC,XB

## 2024-11-03 PROCEDURE — 25000003 PHARM REV CODE 250: Mod: HCNC | Performed by: INTERNAL MEDICINE

## 2024-11-03 PROCEDURE — 94761 N-INVAS EAR/PLS OXIMETRY MLT: CPT | Mod: HCNC

## 2024-11-03 PROCEDURE — P9016 RBC LEUKOCYTES REDUCED: HCPCS | Mod: HCNC

## 2024-11-03 PROCEDURE — 85025 COMPLETE CBC W/AUTO DIFF WBC: CPT | Mod: 91,HCNC | Performed by: SURGERY

## 2024-11-03 PROCEDURE — 25000003 PHARM REV CODE 250: Mod: HCNC

## 2024-11-03 PROCEDURE — 85027 COMPLETE CBC AUTOMATED: CPT | Mod: HCNC

## 2024-11-03 PROCEDURE — 83735 ASSAY OF MAGNESIUM: CPT | Mod: HCNC | Performed by: SURGERY

## 2024-11-03 PROCEDURE — 86920 COMPATIBILITY TEST SPIN: CPT | Mod: HCNC

## 2024-11-03 PROCEDURE — 25000003 PHARM REV CODE 250: Mod: HCNC | Performed by: REGISTERED NURSE

## 2024-11-03 PROCEDURE — P9021 RED BLOOD CELLS UNIT: HCPCS | Mod: HCNC | Performed by: FAMILY MEDICINE

## 2024-11-03 PROCEDURE — 80069 RENAL FUNCTION PANEL: CPT | Mod: HCNC | Performed by: SURGERY

## 2024-11-03 PROCEDURE — 27000207 HC ISOLATION: Mod: HCNC

## 2024-11-03 PROCEDURE — 63600531 PHARM REV CODE 636 NO ALT 250 W HCPCS: Mod: JZ,JG,HCNC

## 2024-11-03 PROCEDURE — 63600175 PHARM REV CODE 636 W HCPCS: Mod: HCNC | Performed by: FAMILY MEDICINE

## 2024-11-03 PROCEDURE — 86850 RBC ANTIBODY SCREEN: CPT | Mod: HCNC

## 2024-11-03 PROCEDURE — 63600175 PHARM REV CODE 636 W HCPCS: Mod: HCNC | Performed by: INTERNAL MEDICINE

## 2024-11-03 PROCEDURE — 20000000 HC ICU ROOM: Mod: HCNC

## 2024-11-03 PROCEDURE — 36415 COLL VENOUS BLD VENIPUNCTURE: CPT | Mod: HCNC

## 2024-11-03 PROCEDURE — 36415 COLL VENOUS BLD VENIPUNCTURE: CPT | Mod: HCNC | Performed by: FAMILY MEDICINE

## 2024-11-03 PROCEDURE — 63600175 PHARM REV CODE 636 W HCPCS: Mod: HCNC

## 2024-11-03 PROCEDURE — 85025 COMPLETE CBC W/AUTO DIFF WBC: CPT | Mod: HCNC

## 2024-11-03 PROCEDURE — 36410 VNPNXR 3YR/> PHY/QHP DX/THER: CPT | Mod: HCNC

## 2024-11-03 PROCEDURE — 30283B1 TRANSFUSION OF NONAUTOLOGOUS 4-FACTOR PROTHROMBIN COMPLEX CONCENTRATE INTO VEIN, PERCUTANEOUS APPROACH: ICD-10-PCS

## 2024-11-03 PROCEDURE — 99900035 HC TECH TIME PER 15 MIN (STAT): Mod: HCNC

## 2024-11-03 PROCEDURE — 80202 ASSAY OF VANCOMYCIN: CPT | Mod: HCNC | Performed by: FAMILY MEDICINE

## 2024-11-03 RX ORDER — NOREPINEPHRINE BITARTRATE/D5W 4MG/250ML
0-3 PLASTIC BAG, INJECTION (ML) INTRAVENOUS CONTINUOUS
Status: DISCONTINUED | OUTPATIENT
Start: 2024-11-03 | End: 2024-11-04

## 2024-11-03 RX ORDER — HYDROCODONE BITARTRATE AND ACETAMINOPHEN 500; 5 MG/1; MG/1
TABLET ORAL
Status: DISCONTINUED | OUTPATIENT
Start: 2024-11-03 | End: 2024-11-04

## 2024-11-03 RX ORDER — HYDROCODONE BITARTRATE AND ACETAMINOPHEN 500; 5 MG/1; MG/1
TABLET ORAL
Status: DISCONTINUED | OUTPATIENT
Start: 2024-11-03 | End: 2024-11-03

## 2024-11-03 RX ORDER — SILVER NITRATE 38.21; 12.74 MG/1; MG/1
4 STICK TOPICAL ONCE
Status: COMPLETED | OUTPATIENT
Start: 2024-11-03 | End: 2024-11-03

## 2024-11-03 RX ORDER — MORPHINE SULFATE 2 MG/ML
2 INJECTION, SOLUTION INTRAMUSCULAR; INTRAVENOUS ONCE
Status: COMPLETED | OUTPATIENT
Start: 2024-11-03 | End: 2024-11-03

## 2024-11-03 RX ORDER — NOREPINEPHRINE BITARTRATE/D5W 4MG/250ML
0-3 PLASTIC BAG, INJECTION (ML) INTRAVENOUS CONTINUOUS
Status: DISCONTINUED | OUTPATIENT
Start: 2024-11-03 | End: 2024-11-03

## 2024-11-03 RX ADMIN — SERTRALINE HYDROCHLORIDE 100 MG: 100 TABLET ORAL at 08:11

## 2024-11-03 RX ADMIN — OXYCODONE HYDROCHLORIDE 10 MG: 10 TABLET, FILM COATED, EXTENDED RELEASE ORAL at 05:11

## 2024-11-03 RX ADMIN — Medication 3396 UNITS: at 01:11

## 2024-11-03 RX ADMIN — ACETAMINOPHEN 650 MG: 325 TABLET ORAL at 11:11

## 2024-11-03 RX ADMIN — INSULIN ASPART 3 UNITS: 100 INJECTION, SOLUTION INTRAVENOUS; SUBCUTANEOUS at 10:11

## 2024-11-03 RX ADMIN — NOREPINEPHRINE BITARTRATE 0.04 MCG/KG/MIN: 4 INJECTION, SOLUTION INTRAVENOUS at 10:11

## 2024-11-03 RX ADMIN — PREGABALIN 50 MG: 50 CAPSULE ORAL at 08:11

## 2024-11-03 RX ADMIN — SODIUM CHLORIDE, POTASSIUM CHLORIDE, SODIUM LACTATE AND CALCIUM CHLORIDE 500 ML: 600; 310; 30; 20 INJECTION, SOLUTION INTRAVENOUS at 10:11

## 2024-11-03 RX ADMIN — CINACALCET HYDROCHLORIDE 30 MG: 30 TABLET, FILM COATED ORAL at 08:11

## 2024-11-03 RX ADMIN — INSULIN ASPART 6 UNITS: 100 INJECTION, SOLUTION INTRAVENOUS; SUBCUTANEOUS at 04:11

## 2024-11-03 RX ADMIN — ATORVASTATIN CALCIUM 80 MG: 40 TABLET, FILM COATED ORAL at 08:11

## 2024-11-03 RX ADMIN — SILVER NITRATE APPLICATORS 4 APPLICATOR: 25; 75 STICK TOPICAL at 10:11

## 2024-11-03 RX ADMIN — PIPERACILLIN SODIUM AND TAZOBACTAM SODIUM 4.5 G: 4; .5 INJECTION, POWDER, LYOPHILIZED, FOR SOLUTION INTRAVENOUS at 04:11

## 2024-11-03 RX ADMIN — PIPERACILLIN SODIUM AND TAZOBACTAM SODIUM 4.5 G: 4; .5 INJECTION, POWDER, LYOPHILIZED, FOR SOLUTION INTRAVENOUS at 05:11

## 2024-11-03 RX ADMIN — INSULIN GLARGINE 5 UNITS: 100 INJECTION, SOLUTION SUBCUTANEOUS at 10:11

## 2024-11-03 RX ADMIN — INSULIN ASPART 8 UNITS: 100 INJECTION, SOLUTION INTRAVENOUS; SUBCUTANEOUS at 11:11

## 2024-11-03 RX ADMIN — INSULIN ASPART 4 UNITS: 100 INJECTION, SOLUTION INTRAVENOUS; SUBCUTANEOUS at 08:11

## 2024-11-03 RX ADMIN — MORPHINE SULFATE 2 MG: 2 INJECTION, SOLUTION INTRAMUSCULAR; INTRAVENOUS at 01:11

## 2024-11-03 NOTE — NURSING
RAPID RESPONSE NURSE PROACTIVE ROUNDING NOTE       Time of Visit: 0010    Admit Date: 10/28/2024  LOS: 6  Code Status: Full Code   Date of Visit: 2024  : 1965  Age: 59 y.o.  Sex: female  Race: Black or   Bed: K472/K472 A:   MRN: [id number]  Was the patient discharged from an ICU this admission? Yes   Was the patient discharged from a PACU within last 24 hours? No   Did the patient receive conscious sedation/general anesthesia in last 24 hours? No   Was the patient in the ED within the past 24 hours? No   Was the patient on NIPPV within the past 24 hours? No   Attending Physician: Shae Starkey MD  Primary Service: Family Medicine,Hospitalist   Time spent at the bedside: 15 -30 min    SITUATION    Notified by charge RN via phone call  Reason for alert: Bleeding w/ Hypotension    Diagnosis: Cellulitis of left thigh   has a past medical history of Anxiety, Chronic pain syndrome, CKD (chronic kidney disease), stage III, Depression, Diabetes mellitus, type 2, GERD (gastroesophageal reflux disease), Hyperemesis, Hypokalemia, Infection of below knee amputation stump, Osteomyelitis, Osteomyelitis of left foot, Ulcer of left foot, and Vaginal delivery.    Last Vitals:  Temp: 99 °F (37.2 °C) ( 1900)  Pulse: 90 ( 0007)  Resp: 22 (6)  BP: 93/41 (2322)  SpO2: 98 % (2322)    24 Hour Vitals Range:  Temp:  [98.2 °F (36.8 °C)-99 °F (37.2 °C)]   Pulse:  [60-93]   Resp:  [17-22]   BP: ()/(41-99)   SpO2:  [94 %-98 %]     Clinical Issues: Circulatory    ASSESSMENT/INTERVENTIONS    Notified by Charge Matilda BRIGHT that pt has active bleed from left stump wound with hypotension. Per charge RNWENDI, NP has been to bedside to dress wound with surgicel and gauze dressing. Bloody drainage noted to dressing; outlined and timed. Pt not participating in meaningful conversation, but complaining of pain and moaning. BP 82/42 (51), HR 88. 2U PRBc ordered and to be given.      RECOMMENDATIONS  - PRBC  - pain medication when safe to do so.     Discussed plan of care with charge RNMatilda    PROVIDER ESCALATION    Physician escalation: Yes    Orders received and case discussed with  WENDI Fletcher NP.    Disposition:Remain in room 472    FOLLOW UP    Call back the Rapid Response NurseStacey at [phone] for additional questions or concerns.

## 2024-11-03 NOTE — PLAN OF CARE
Problem: Diabetes Comorbidity  Goal: Blood Glucose Level Within Targeted Range  Outcome: Progressing     CBG monitored AC/HS    Problem: Wound  Goal: Optimal Pain Control and Function  Outcome: Progressing     2mg morphine given for pain this shift     Problem: Wound  Goal: Optimal Wound Healing  Outcome: Progressing     Wound care performed this shift; dressing monitored    Problem: Comorbidity Management  Goal: Blood Pressure in Desired Range  Outcome: Progressing     BP monitored closely this shift; 1 unit PRBCs infused this shift    Problem: Adult Inpatient Plan of Care  Goal: Plan of Care Review  Outcome: Progressing     Pt updated on POC throughout shift

## 2024-11-03 NOTE — ASSESSMENT & PLAN NOTE
Anemia is likely due to acute blood loss which was from bleeding from L stump wound . Most recent hemoglobin and hematocrit are listed below.  Recent Labs     11/03/24  0553 11/03/24  1024 11/03/24  1432   HGB 8.2* 7.3* 7.8*   HCT 25.0* 21.5* 23.0*     Plan  - Monitor serial CBC: Daily-- sooner if re bleeding occurs  - Transfuse PRBC if patient becomes hemodynamically unstable, symptomatic or H/H drops below 7/21.  - Patient has received 3 units of PRBCs on 11/3  - Patient's anemia is currently stable  - acute on chronic anemia due to bleed from L stump wound.

## 2024-11-03 NOTE — NURSING
Stump redressed with surgicel, rolled gauze, Abd pads, and ace wrap. New pictures uploaded to media in chart.

## 2024-11-03 NOTE — NURSING
@1300: Dr. Starkey at bedside to assess pt; per MD to give PRN oxycodone tablet if pt BP is not decreasing and the 2mg of morphine does not help her pain

## 2024-11-03 NOTE — CARE UPDATE
Called by bedside RN for active bleeding that has soaked bed. Upon arrival patient's L stump wound actively bleeding with blood accumulated around patient saturating sheets. Surgicel and gauze applied to wound, CBC in process.

## 2024-11-03 NOTE — NURSING
RAPID RESPONSE NURSE PROACTIVE ROUNDING NOTE       Single lumen 20G, 2.25IN AccuCath placed in the right basilic vein. Needle advanced into the vessel under real time ultrasound guidance.    Max dwell date: 11/13/24   Lot number: [id number]

## 2024-11-03 NOTE — ASSESSMENT & PLAN NOTE
Suyapa Connelly is a 60yo F with b/l AKAs and wound breakdown at L AKA. General Surgery consulted for concern for bleeding at L stump incision     - Bleeding did not appear significant enough to cause a 3 point drop in hgb on my exam but per nursing/ICU team she has saturated multiple dressings since last night.  - Silver nitrate applied to small area of oozing at medial aspect of incision with good hemostasis. No other bleeding noted. Pressure dressing applied with gauze and ace wrap  - If patient continues to bleed from incision would recommend trying to reapply silver nitrate and holding pressure/continuing pressure wrapping.   - Recommend q6h CBC or more frequently if Levo requirements continue to increase  - Continue resuscitation with blood/IV fluids as appropriate  - If CBC continues to drop would look for alternate source of bleeding  - Rest per primary  - Please call General Surgery with any questions

## 2024-11-03 NOTE — CONSULTS
[facility] - Intensive Care  General Surgery  Consult Note    Patient Name: Suyapa Connelly  MRN: [id number]  Code Status: Full Code  Admission Date: 10/28/2024  Hospital Length of Stay: 6 days  Attending Physician: Shae Starkey MD  Primary Care Provider: Vanessa Noel MD    Patient information was obtained from past medical records and primary team.     Inpatient consult to General Surgery  Consult performed by: Itzel Kuo MD  Consult ordered by: Viral Carnes NP        Subjective:     Principal Problem: Cellulitis of left thigh    History of Present Illness: Suyapa Connelly is known to our service. She is a 58yo F with pmhx of hypertension, hyperlipidemia, diabetes mellitus type 2, ESRD on HD, and PAD (s/p B/l AKA) who presented to the ED after being found on the floor by her neighbor. Patient reports that she got out of bed to change her pad and diaper but was unable to get back into bed and stayed on the floor throughout the evening. Per nursing, patient was altered and minimally responsive on admission to ICU but has become more alert since starting HD/ being put on bipap. Patient was noted to have some wound breakdown at the medial aspect of her L AKA incision and CT was obtained that showed subcutaneous fatty stranding of the left thigh and distal medial thigh subcutaneous emphysema. WBC and lactate wnl. General Surgery was consulted for further evaluation d/t concerns for nec fasc. On exam patient is more alert and answering questions appropriately. Wound breakdown at medial aspect of L AKA incision with some surrounding induration. No significant erythema, fluctuance or drainage noted. Patient does have pain on palpation of that area. Patient was determined not to have nec fasc based on exam and labs. No indications for wound debridement and recommended continuing with current wound care regimen. She had been progressing well and was stepped down to the floor. Overnight on 11/3/24 she  was noted to have significant bleeding from L stump with saturation of bedding. Surgicel was applied by primary team and CBC drawn that showed a hgb of 6.6 from 9.4. Blood products were infused and she was stepped up to the ICU for further monitoring. General Surgery was consulted for evaluation. Hgb came up to 8.2 with transfusion of 1u pRBC. This am she has reportedly continued to saturate her dressing even with pressure applied and had to be started on levo to maintain her pressures.     No current facility-administered medications on file prior to encounter.     Current Outpatient Medications on File Prior to Encounter   Medication Sig    acetaminophen (TYLENOL) 500 MG tablet Take 2 tablets (1,000 mg total) by mouth every 8 (eight) hours as needed for Pain. (Patient taking differently: Take 650 mg by mouth every 8 (eight) hours as needed for Pain.)    allopurinoL (ZYLOPRIM) 100 MG tablet Take 0.5 tablets (50 mg total) by mouth every other day.    apixaban (ELIQUIS) 5 mg Tab Take 1 tablet (5 mg total) by mouth 2 (two) times daily.    atorvastatin (LIPITOR) 80 MG tablet Take 1 tablet (80 mg total) by mouth once daily.    blood sugar diagnostic Strp 1 each by Misc.(Non-Drug; Combo Route) route 3 (three) times daily.    blood sugar diagnostic Strp Use to check blood glucose 2 (two) times a day.    blood-glucose meter Misc TEST TWICE DAILY    blood-glucose sensor (DEXCOM G7 SENSOR) Radha 1 each by Misc.(Non-Drug; Combo Route) route once daily.    calcitRIOL (ROCALTROL) 0.5 MCG Cap Take 1 capsule (0.5 mcg total) by mouth once daily.    calcium acetate,phosphat bind, (PHOSLO) 667 mg capsule Take 1 capsule (667 mg total) by mouth 3 (three) times daily with meals.    carvediloL (COREG) 6.25 MG tablet Take 1 tablet (6.25 mg total) by mouth 2 (two) times daily.    clopidogreL (PLAVIX) 75 mg tablet Take 1 tablet (75 mg total) by mouth once daily.    dextrose (GLUTOSE) 40 % gel Take 37.5 mLs (15,000 mg total) by mouth once.  "for 1 dose    epoetin nina (PROCRIT) 4,000 unit/mL injection Inject 1 mL (4,000 Units total) into the skin every Mon, Wed, Fri.    famotidine (PEPCID) 20 MG tablet Take 1 tablet (20 mg total) by mouth once daily. (Patient not taking: Reported on 10/15/2024)    furosemide (LASIX) 40 MG tablet Take 1 tablet (40 mg total) by mouth 2 (two) times a day.    hydrALAZINE (APRESOLINE) 50 MG tablet Take 1 tablet (50 mg total) by mouth 3 (three) times daily.    insulin aspart U-100 (NOVOLOG) 100 unit/mL (3 mL) InPn pen Inject 3 Units into the skin 3 (three) times daily with meals. (Patient not taking: Reported on 10/15/2024)    insulin glargine U-100, Lantus, (LANTUS U-100 INSULIN) 100 unit/mL injection Inject 8 Units into the skin every evening.    lancets 33 gauge Misc TEST 3 TIMES DAILY    multivitamin (ONE DAILY MULTIVITAMIN) per tablet Take 1 tablet by mouth once daily.    pen needle, diabetic 32 gauge x 5/32" Ndle 1 Units by Misc.(Non-Drug; Combo Route) route before meals as needed (SSI).    pen needle, diabetic 33 gauge x 5/32" Ndle 1 each by Misc.(Non-Drug; Combo Route) route 3 (three) times daily.    pregabalin (LYRICA) 50 MG capsule Take 1 capsule (50 mg total) by mouth 3 (three) times a week.    sertraline (ZOLOFT) 100 MG tablet Take 1 tablet (100 mg total) by mouth once daily.    sodium bicarbonate 650 MG tablet Take 1 tablet (650 mg total) by mouth 3 (three) times daily.    traZODone (DESYREL) 50 MG tablet Take 0.5 tablets (25 mg total) by mouth every evening.    wound dressings (TRIAD WOUND DRESSING) Pste Apply 2 g topically once daily.    [DISCONTINUED] lancing device Misc 1 Device by Misc.(Non-Drug; Combo Route) route 2 (two) times daily with meals.       Review of patient's allergies indicates:   Allergen Reactions    Ciprofloxacin Itching    Iodine      Kidney injury    Pcn [penicillins]      Rash; tolerated ceftriaxone on 1/13/20       Past Medical History:   Diagnosis Date    Anxiety     Chronic pain " syndrome     CKD (chronic kidney disease), stage III     Depression     Diabetes mellitus, type 2     GERD (gastroesophageal reflux disease)     Hyperemesis 03/23/2021    Hypokalemia 03/23/2021    Infection of below knee amputation stump 03/12/2022    Osteomyelitis     Osteomyelitis of left foot 04/30/2021    Ulcer of left foot     Vaginal delivery     x1     Past Surgical History:   Procedure Laterality Date    ABOVE-KNEE AMPUTATION Left 5/18/2021    Procedure: AMPUTATION, ABOVE KNEE;  Surgeon: Teddy Huber MD;  [facility];  Service: Vascular;  Laterality: Left;    ABOVE-KNEE AMPUTATION Right 3/18/2022    Procedure: AMPUTATION, ABOVE KNEE;  Surgeon: DAYNE Florez II, MD;  [facility];  Service: Vascular;  Laterality: Right;    Angiogram - Right Extremity Right 7/9/15    angiogram-left leg  10/6/15    ANGIOGRAPHY OF LOWER EXTREMITY Left 4/29/2021    Procedure: ANGIOGRAM, LOWER EXTREMITY;  Surgeon: Teddy Huber MD;  Location: [facility] OR [address] [place] [address];  Service: Vascular;  Laterality: Left;    BELOW KNEE AMPUTATION OF LOWER EXTREMITY Right 12/28/2021    Procedure: AMPUTATION, BELOW KNEE;  Surgeon: Kaitlyn Rojas MD;  Location: [facility] OR;  Service: General;  Laterality: Right;    CATHETERIZATION OF BOTH LEFT AND RIGHT HEART N/A 12/18/2019    Procedure: CATHETERIZATION, HEART, BOTH LEFT AND RIGHT;  Surgeon: Que Fernando III, MD;  Location: [facility] CATH LAB;  Service: Cardiology;  Laterality: N/A;    CORONARY ANGIOGRAPHY N/A 12/18/2019    Procedure: ANGIOGRAM, CORONARY ARTERY;  Surgeon: Que Fernando III, MD;  Location: [facility] CATH LAB;  Service: Cardiology;  Laterality: N/A;    CORONARY ANGIOGRAPHY INCLUDING BYPASS GRAFTS WITH CATHETERIZATION OF LEFT HEART N/A 7/28/2020    Procedure: ANGIOGRAM, CORONARY, INCLUDING BYPASS GRAFT, WITH LEFT HEART CATHETERIZATION, 9 am;  Surgeon: Rachel Easley MD;  Location: [facility] CATH LAB;  Service: Cardiology;  Laterality: N/A;     CORONARY ARTERY BYPASS GRAFT (CABG) N/A 1/14/2020    Procedure: CORONARY ARTERY BYPASS GRAFT (CABG) x 1     Off Pump;  Surgeon: Huang Altamirano MD;  Location: [facility] OR [address] [place] [address];  Service: Cardiovascular;  Laterality: N/A;    CREATION OF FEMORAL-TIBIAL ARTERY BYPASS Left 4/29/2021    Procedure: CREATION, BYPASS, ARTERIAL, FEMORAL TO ANTERIOR TIBIAL;  Surgeon: Teddy Huber MD;  Location: [facility] OR [address] [place] [address];  Service: Vascular;  Laterality: Left;    CREATION OF FEMOROPOPLITEAL ARTERIAL BYPASS USING GRAFT Left 8/18/2020    Procedure: CREATION, BYPASS, ARTERIAL, FEMORAL TO POPLITEAL, USING GRAFT, LEFT LOWER EXTREMITY;  Surgeon: Teddy Huber MD;  Location: [facility] OR;  Service: Vascular;  Laterality: Left;  REQUEST 7:15 A.M. START----COVID NEGATIVE ON 8/17 1ST CASE STARTKENYA DUEÑAS ON 8/7/2020 @ 942AM-  RN PREOP 8/12/2020   T/S-----CLEARED BY CARDS-------PENDING INSURANCE    DEBRIDEMENT OF FOOT Left 9/8/2020    Procedure: DEBRIDEMENT, FOOT;  Surgeon: Rosio Mayes DPM;  Location: [facility] OR;  Service: Podiatry;  Laterality: Left;  request neoxx .   RN Pre Op 9-4-2020, Covid negative on 9/5/20. C A    DEBRIDEMENT OF FOOT  3/4/2021    Procedure: DEBRIDEMENT, FOOT;  Surgeon: Teddy Huber MD;  Location: [facility] OR;  Service: Vascular;;    DEBRIDEMENT OF FOOT Left 3/9/2021    Procedure: DEBRIDEMENT, FOOT, bone biopsy;  Surgeon: Rosio Mayes DPM;  Location: [facility] OR;  Service: Podiatry;  Laterality: Left;  Request neoxx---COVID IN AM  REQUESTING NOON START  RN Phone Pre op.On Blood thinners Plavix and Eliquis.  Covid am of surgery. C A    DEBRIDEMENT OF FOOT Left 5/4/2021    Procedure: DEBRIDEMENT, FOOT;  Surgeon: Farooq Morley DPM;  [facility];  Service: Podiatry;  Laterality: Left;    INSERTION OF TUNNELED CENTRAL VENOUS HEMODIALYSIS CATHETER N/A 1/27/2020    Procedure: Insertion, Catheter, Central Venous, Hemodialysis;  Surgeon: ESTEBAN Gomez III, MD;  Location: [facility] CATH LAB;   Service: Peripheral Vascular;  Laterality: N/A;    INSERTION OF TUNNELED CENTRAL VENOUS HEMODIALYSIS CATHETER  5/10/2023    Procedure: Insertion, Catheter, Central Venous, Hemodialysis;  Surgeon: Romulo Queen MD;  Location: [facility] CATH LAB;  Service: Cardiology;;    PERCUTANEOUS TRANSLUMINAL ANGIOPLASTY N/A 3/4/2021    Procedure: PTA (ANGIOPLASTY, PERCUTANEOUS, TRANSLUMINAL);  Surgeon: Teddy Huber MD;  Location: [facility] OR;  Service: Vascular;  Laterality: N/A;    REMOVAL OF ARTERIOVENOUS GRAFT Left 5/27/2021    Procedure: REMOVAL, GRAFT, LEFT LOWER EXTREMITY, WOUND EXPLORATION;  Surgeon: Teddy Hbuer MD;  Location: [facility] OR 2ND FLR;  Service: Vascular;  Laterality: Left;    REMOVAL OF NAIL OF DIGIT Left 3/9/2021    Procedure: REMOVAL, NAIL, DIGIT;  Surgeon: Rosio Mayes DPM;  Location: [facility] OR;  Service: Podiatry;  Laterality: Left;    RIGHT HEART CATHETERIZATION Right 5/10/2023    Procedure: INSERTION, CATHETER, RIGHT HEART;  Surgeon: Romluo Queen MD;  Location: [facility] CATH LAB;  Service: Cardiology;  Laterality: Right;    THROMBECTOMY Left 3/4/2021    Procedure: THROMBECTOMY, LEFT LOWER EXTREMITY BYPASS GRAFT, ANGIOGRAM, POSSIBLE INTERVENTION, POSSIBLE LEFT LOWER EXTREMITY BYPASS;  Surgeon: Teddy Huber MD;  Location: [facility] OR;  Service: Vascular;  Laterality: Left;    THROMBECTOMY Left 4/29/2021    Procedure: GRAFT THROMBECTOMY, LEFT LOWER EXTREMITY;  Surgeon: Teddy Huber MD;  Location: [facility] OR 2ND FLR;  Service: Vascular;  Laterality: Left;  14.5 min  1179.85 mGy  341.01 Gycm2  240 ml dye    THROMBECTOMY  10/22/2021    Procedure: THROMBECTOMY;  Surgeon: Saad Arenas MD;  Location: [facility] CATH LAB/EP;  Service: Cardiology;;     Family History       Problem Relation (Age of Onset)    Diabetes Mother, Father, Paternal Grandmother    Heart disease Maternal Grandmother    No Known Problems Maternal Grandfather, Paternal Grandfather          Tobacco Use    Smoking status:  Former    Smokeless tobacco: Never   Substance and Sexual Activity    Alcohol use: No    Drug use: Yes     Types: Marijuana     Comment: occassional    Sexual activity: Yes     Partners: Male     Review of Systems   Reason unable to perform ROS: does not respond appropriately to questions.     Objective:     Vital Signs (Most Recent):  Temp: 98.1 °F (36.7 °C) (11/03/24 1121)  Pulse: 92 (11/03/24 1121)  Resp: 14 (11/03/24 1121)  BP: (!) 102/52 (11/03/24 1121)  SpO2: 97 % (11/03/24 1121) Vital Signs (24h Range):  Temp:  [97.6 °F (36.4 °C)-99 °F (37.2 °C)] 98.1 °F (36.7 °C)  Pulse:  [] 92  Resp:  [10-22] 14  SpO2:  [71 %-100 %] 97 %  BP: ()/(32-88) 102/52     Weight: 68 kg (150 lb)  Body mass index is 45.77 kg/m².     Physical Exam  Constitutional:       General: She is not in acute distress.     Appearance: She is ill-appearing.   HENT:      Head: Normocephalic and atraumatic.   Pulmonary:      Effort: Pulmonary effort is normal. No respiratory distress.   Musculoskeletal:      Comments: B/l AKA   Skin:     Comments: Patient's L stump incision is broken down with some necrotic appearing skin. 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  CBC:   Recent Labs   Lab 11/03/24  1024   WBC 15.75*   RBC 2.30*   HGB 7.3*   HCT 21.5*      MCV 94   MCH 31.7*   MCHC 34.0     CMP:   Recent Labs   Lab 11/02/24  0534 11/03/24  0553    219*   CALCIUM 8.6* 7.5*   ALBUMIN 1.7* 1.6*   PROT 7.8  --    * 133*   K 4.1 4.4   CO2 25 25   CL 92* 97   BUN 29* 30*   CREATININE 3.6* 3.3*   ALKPHOS 636*  --    ALT 32  --    AST 77*  --    BILITOT 0.7  --        Significant Diagnostics:  I have reviewed all pertinent imaging results/findings within the past 24 hours.    Assessment/Plan:     S/P  AKA (above knee amputation) bilateral  Suyapa Connelly is a 58yo F with b/l AKAs and wound breakdown at L AKA. General Surgery consulted for concern for bleeding at L stump incision     - Bleeding did not appear significant enough to cause a 3 point drop in hgb on my exam but per nursing/ICU team she has saturated multiple dressings since last night.  - Silver nitrate applied to small area of oozing at medial aspect of incision with good hemostasis. No other bleeding noted. Pressure dressing applied with gauze and ace wrap  - If patient continues to bleed from incision would recommend trying to reapply silver nitrate and holding pressure/continuing pressure wrapping.   - Recommend q6h CBC or more frequently if Levo requirements continue to increase  - Continue resuscitation with blood/IV fluids as appropriate  - If CBC continues to drop would look for alternate source of bleeding  - Rest per primary  - Please call General Surgery with any questions        Thank you for your consult. I will follow-up with patient. Please contact us if you have any additional questions.    Itzel Kuo MD  General Surgery  [facility] - Intensive Care

## 2024-11-03 NOTE — PLAN OF CARE
Problem: Adult Inpatient Plan of Care  Goal: Plan of Care Review  Outcome: Progressing     Problem: Adult Inpatient Plan of Care  Goal: Patient-Specific Goal (Individualized)  Outcome: Progressing     Problem: Wound  Goal: Skin Health and Integrity  Outcome: Progressing     Problem: Wound  Goal: Optimal Wound Healing  Outcome: Progressing

## 2024-11-03 NOTE — CONSULTS
Pulmonary & Critical Care Medicine Consult Note    Primary Attending Physician: Dr. Starkey  Consultant Attending: Dr. Crawford  Consultant Fellow: Dr. Walton    Reason for Consult:     Hemoragic shock from bleeding leg wound    Subjective:      History of Present Illness:  Suyapa Connelly is a 59 y.o. AA female who  has a past medical history of Anxiety, Chronic pain syndrome, CKD (chronic kidney disease), stage III, Depression, Diabetes mellitus, type 2, GERD (gastroesophageal reflux disease), Hyperemesis (03/23/2021), Hypokalemia (03/23/2021), Infection of below knee amputation stump (03/12/2022), Osteomyelitis, Osteomyelitis of left foot (04/30/2021), Ulcer of left foot, and Vaginal delivery.. The patient presented to the [facility] on 10/28/2024 with a primary complaint of fall and cellulitis of LE. Patient recently had bilateral AKA. She was being treated on the med/surg unit for cellulitis of her left thigh when ist was noted that she had significant bleeding from her left leg stump wound. Hb dropped from 9 to 6. Patient was transferred to the ICU for management of hemorrhagic shock. She received 1 unit PRBC which improved her Blood pressure and did not require pressor support.    Patient seen and examined at bedside. Patient resting in bed comfortably in no acute distress. 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 Nursing reports no overnight events. Working on pain control. Gen surgery has been consulted and will evaluate patient today. Patient complains of bilateral LE stump pain. She denies any headaches, fever, chills, chest pain SOB, abd pain, nausea, vomiting, diarrhea, or constipation.          Past Medical History:  Past Medical History:   Diagnosis Date    Anxiety     Chronic pain syndrome     CKD (chronic kidney disease), stage III     Depression     Diabetes mellitus, type 2     GERD (gastroesophageal reflux disease)     Hyperemesis  03/23/2021    Hypokalemia 03/23/2021    Infection of below knee amputation stump 03/12/2022    Osteomyelitis     Osteomyelitis of left foot 04/30/2021    Ulcer of left foot     Vaginal delivery     x1       Past Surgical History:  Past Surgical History:   Procedure Laterality Date    ABOVE-KNEE AMPUTATION Left 5/18/2021    Procedure: AMPUTATION, ABOVE KNEE;  Surgeon: Teddy Huber MD;  Location: [facility] OR [address] [place] [address];  Service: Vascular;  Laterality: Left;    ABOVE-KNEE AMPUTATION Right 3/18/2022    Procedure: AMPUTATION, ABOVE KNEE;  Surgeon: DAYNE Florez II, MD;  Location: [facility] OR [address] [place] [address];  Service: Vascular;  Laterality: Right;    Angiogram - Right Extremity Right 7/9/15    angiogram-left leg  10/6/15    ANGIOGRAPHY OF LOWER EXTREMITY Left 4/29/2021    Procedure: ANGIOGRAM, LOWER EXTREMITY;  Surgeon: Teddy Huber MD;  Location: [facility] OR [address] [place] [address];  Service: Vascular;  Laterality: Left;    BELOW KNEE AMPUTATION OF LOWER EXTREMITY Right 12/28/2021    Procedure: AMPUTATION, BELOW KNEE;  Surgeon: Kaitlyn Rojas MD;  Location: [facility] OR;  Service: General;  Laterality: Right;    CATHETERIZATION OF BOTH LEFT AND RIGHT HEART N/A 12/18/2019    Procedure: CATHETERIZATION, HEART, BOTH LEFT AND RIGHT;  Surgeon: Que Fernando III, MD;  Location: [facility] CATH LAB;  Service: Cardiology;  Laterality: N/A;    CORONARY ANGIOGRAPHY N/A 12/18/2019    Procedure: ANGIOGRAM, CORONARY ARTERY;  Surgeon: Que Fernando III, MD;  Location: [facility] CATH LAB;  Service: Cardiology;  Laterality: N/A;    CORONARY ANGIOGRAPHY INCLUDING BYPASS GRAFTS WITH CATHETERIZATION OF LEFT HEART N/A 7/28/2020    Procedure: ANGIOGRAM, CORONARY, INCLUDING BYPASS GRAFT, WITH LEFT HEART CATHETERIZATION, 9 am;  Surgeon: Rachel Easley MD;  Location: [facility] CATH LAB;  Service: Cardiology;  Laterality: N/A;    CORONARY ARTERY BYPASS GRAFT (CABG) N/A 1/14/2020    Procedure: CORONARY ARTERY BYPASS GRAFT (CABG) x 1     Off Pump;  Surgeon: Huang CARTER  MD Adarsh;  [address];  Service: Cardiovascular;  Laterality: N/A;    CREATION OF FEMORAL-TIBIAL ARTERY BYPASS Left 4/29/2021    Procedure: CREATION, BYPASS, ARTERIAL, FEMORAL TO ANTERIOR TIBIAL;  Surgeon: Teddy Huber MD;  [address];  Service: Vascular;  Laterality: Left;    CREATION OF FEMOROPOPLITEAL ARTERIAL BYPASS USING GRAFT Left 8/18/2020    Procedure: CREATION, BYPASS, ARTERIAL, FEMORAL TO POPLITEAL, USING GRAFT, LEFT LOWER EXTREMITY;  Surgeon: Teddy Huber MD;  Location: [facility];  Service: Vascular;  Laterality: Left;  REQUEST 7:15 A.M. START----COVID NEGATIVE ON 8/17 1ST CASE STARTE PER LEANA ON 8/7/2020 @ 942AM-LO  RN PREOP 8/12/2020   T/S-----CLEARED BY CARDS-------PENDING INSURANCE    DEBRIDEMENT OF FOOT Left 9/8/2020    Procedure: DEBRIDEMENT, FOOT;  Surgeon: Rosio Mayes DPM;  Location: [facility] OR;  Service: Podiatry;  Laterality: Left;  request neoxx .   RN Pre Op 9-4-2020, Covid negative on 9/5/20. C A    DEBRIDEMENT OF FOOT  3/4/2021    Procedure: DEBRIDEMENT, FOOT;  Surgeon: Teddy Huber MD;  Location: [facility] OR;  Service: Vascular;;    DEBRIDEMENT OF FOOT Left 3/9/2021    Procedure: DEBRIDEMENT, FOOT, bone biopsy;  Surgeon: Rosio Mayes DPM;  Location: [facility] OR;  Service: Podiatry;  Laterality: Left;  Request neoxx---COVID IN AM  REQUESTING NOON START  RN Phone Pre op.On Blood thinners Plavix and Eliquis.  Covid am of surgery. C A    DEBRIDEMENT OF FOOT Left 5/4/2021    Procedure: DEBRIDEMENT, FOOT;  Surgeon: Farooq Morley DPM;  Location: [address];  Service: Podiatry;  Laterality: Left;    INSERTION OF TUNNELED CENTRAL VENOUS HEMODIALYSIS CATHETER N/A 1/27/2020    Procedure: Insertion, Catheter, Central Venous, Hemodialysis;  Surgeon: ESTEBAN Gomez III, MD;  Location: [facility] CATH LAB;  Service: Peripheral Vascular;  Laterality: N/A;    INSERTION OF TUNNELED CENTRAL VENOUS HEMODIALYSIS CATHETER  5/10/2023    Procedure:  Insertion, Catheter, Central Venous, Hemodialysis;  Surgeon: Romulo Queen MD;  Location: [facility] CATH LAB;  Service: Cardiology;;    PERCUTANEOUS TRANSLUMINAL ANGIOPLASTY N/A 3/4/2021    Procedure: PTA (ANGIOPLASTY, PERCUTANEOUS, TRANSLUMINAL);  Surgeon: Teddy Huber MD;  Location: [facility] OR;  Service: Vascular;  Laterality: N/A;    REMOVAL OF ARTERIOVENOUS GRAFT Left 5/27/2021    Procedure: REMOVAL, GRAFT, LEFT LOWER EXTREMITY, WOUND EXPLORATION;  Surgeon: Teddy Huber MD;  Location: [facility] OR 2ND FLR;  Service: Vascular;  Laterality: Left;    REMOVAL OF NAIL OF DIGIT Left 3/9/2021    Procedure: REMOVAL, NAIL, DIGIT;  Surgeon: Rosio Mayes DPM;  Location: [facility] OR;  Service: Podiatry;  Laterality: Left;    RIGHT HEART CATHETERIZATION Right 5/10/2023    Procedure: INSERTION, CATHETER, RIGHT HEART;  Surgeon: Romulo Queen MD;  Location: [facility] CATH LAB;  Service: Cardiology;  Laterality: Right;    THROMBECTOMY Left 3/4/2021    Procedure: THROMBECTOMY, LEFT LOWER EXTREMITY BYPASS GRAFT, ANGIOGRAM, POSSIBLE INTERVENTION, POSSIBLE LEFT LOWER EXTREMITY BYPASS;  Surgeon: Teddy Huber MD;  Location: [facility] OR;  Service: Vascular;  Laterality: Left;    THROMBECTOMY Left 4/29/2021    Procedure: GRAFT THROMBECTOMY, LEFT LOWER EXTREMITY;  Surgeon: Teddy Huber MD;  Location: [facility] 2ND FLR;  Service: Vascular;  Laterality: Left;  14.5 min  1179.85 mGy  341.01 Gycm2  240 ml dye    THROMBECTOMY  10/22/2021    Procedure: THROMBECTOMY;  Surgeon: Saad Arenas MD;  Location: [facility] CATH LAB/EP;  Service: Cardiology;;       Allergies:  Review of patient's allergies indicates:   Allergen Reactions    Ciprofloxacin Itching    Iodine      Kidney injury    Pcn [penicillins]      Rash; tolerated ceftriaxone on 1/13/20       Medications:   In-Hospital Scheduled Medications:   atorvastatin  80 mg Oral Daily    cinacalcet  30 mg Oral Daily with breakfast    insulin glargine U-100  5 Units Subcutaneous QHS     piperacillin-tazobactam (Zosyn) IV (PEDS and ADULTS) (extended infusion is not appropriate)  4.5 g Intravenous Q12H    pregabalin  50 mg Oral Daily    sertraline  100 mg Oral Daily      In-Hospital PRN Medications:    Current Facility-Administered Medications:     0.9%  NaCl infusion (for blood administration), , Intravenous, Q24H PRN    0.9%  NaCl infusion (for blood administration), , Intravenous, Q24H PRN    0.9% NaCl, , Intravenous, PRN    acetaminophen, 650 mg, Oral, Q4H PRN    dextrose 10%, 12.5 g, Intravenous, PRN    dextrose 10%, 25 g, Intravenous, PRN    glucagon (human recombinant), 1 mg, Intramuscular, PRN    glucose, 16 g, Oral, PRN    glucose, 24 g, Oral, PRN    heparin (porcine), 3,600 Units, Intravenous, PRN    insulin aspart U-100, 0-10 Units, Subcutaneous, QID (AC + HS) PRN    melatonin, 6 mg, Oral, Nightly PRN    naloxone, 0.02 mg, Intravenous, PRN    ondansetron, 4 mg, Intravenous, Q8H PRN    oxyCODONE, 10 mg, Oral, Q12H PRN    polyethylene glycol, 17 g, Oral, PRN    senna-docusate 8.6-50 mg, 1 tablet, Oral, BID PRN    sodium chloride 0.9%, 250 mL, Intravenous, PRN    sodium chloride 0.9%, 10 mL, Intravenous, Q8H PRN    Pharmacy to dose Vancomycin consult, , , Once **AND** vancomycin - pharmacy to dose, , Intravenous, pharmacy to manage frequency   In-Hospital IV Infusion Medications:   NORepinephrine bitartrate-D5W  0-3 mcg/kg/min Intravenous Continuous          Home Medications:  Prior to Admission medications    Medication Sig Start Date End Date Taking? Authorizing Provider   acetaminophen (TYLENOL) 500 MG tablet Take 2 tablets (1,000 mg total) by mouth every 8 (eight) hours as needed for Pain.  Patient taking differently: Take 650 mg by mouth every 8 (eight) hours as needed for Pain. 7/4/24   Frances Bee MD   allopurinoL (ZYLOPRIM) 100 MG tablet Take 0.5 tablets (50 mg total) by mouth every other day. 7/4/24 6/29/25  Frances Bee MD   apixaban (ELIQUIS) 5 mg Tab Take 1 tablet (5 mg  total) by mouth 2 (two) times daily. 10/15/24 10/15/25  Vanessa Noel MD   atorvastatin (LIPITOR) 80 MG tablet Take 1 tablet (80 mg total) by mouth once daily. 10/15/24 10/15/25  Vanessa Noel MD   blood sugar diagnostic Strp 1 each by Misc.(Non-Drug; Combo Route) route 3 (three) times daily. 10/15/24   Vanessa Noel MD   blood sugar diagnostic Strp Use to check blood glucose 2 (two) times a day. 10/15/24   Vanessa Noel MD   blood-glucose meter Misc TEST TWICE DAILY 7/4/24   Frances Bee MD   blood-glucose sensor (DEXCOM G7 SENSOR) Radha 1 each by Misc.(Non-Drug; Combo Route) route once daily. 10/15/24 10/15/25  Vanessa Noel MD   calcitRIOL (ROCALTROL) 0.5 MCG Cap Take 1 capsule (0.5 mcg total) by mouth once daily. 7/4/24 7/4/25  Frances Bee MD   calcium acetate,phosphat bind, (PHOSLO) 667 mg capsule Take 1 capsule (667 mg total) by mouth 3 (three) times daily with meals. 7/4/24 7/4/25  Frances Bee MD   carvediloL (COREG) 6.25 MG tablet Take 1 tablet (6.25 mg total) by mouth 2 (two) times daily. 10/15/24 10/15/25  Vanessa Noel MD   clopidogreL (PLAVIX) 75 mg tablet Take 1 tablet (75 mg total) by mouth once daily. 10/15/24 10/15/25  Vanessa Noel MD   dextrose (GLUTOSE) 40 % gel Take 37.5 mLs (15,000 mg total) by mouth once. for 1 dose 10/15/24 10/15/24  Vanessa Noel MD   epoetin nina (PROCRIT) 4,000 unit/mL injection Inject 1 mL (4,000 Units total) into the skin every Mon, Wed, Fri. 7/1/24   Rodríguez Pratt MD   famotidine (PEPCID) 20 MG tablet Take 1 tablet (20 mg total) by mouth once daily.  Patient not taking: Reported on 10/15/2024 7/4/24   Frances Bee MD   furosemide (LASIX) 40 MG tablet Take 1 tablet (40 mg total) by mouth 2 (two) times a day. 10/15/24 1/13/25  Vanessa Noel MD   hydrALAZINE (APRESOLINE) 50 MG tablet Take 1 tablet (50 mg total) by mouth 3 (three) times daily. 10/15/24 10/15/25  Vanessa Noel MD   insulin  "aspart U-100 (NOVOLOG) 100 unit/mL (3 mL) InPn pen Inject 3 Units into the skin 3 (three) times daily with meals.  Patient not taking: Reported on 10/15/2024 7/4/24 7/4/25  Frances Bee MD   insulin glargine U-100, Lantus, (LANTUS U-100 INSULIN) 100 unit/mL injection Inject 8 Units into the skin every evening.    Provider, Historical   lancets 33 gauge Misc TEST 3 TIMES DAILY 10/15/24   Vanessa Noel MD   multivitamin (ONE DAILY MULTIVITAMIN) per tablet Take 1 tablet by mouth once daily.    Provider, Historical   pen needle, diabetic 32 gauge x 5/32" Ndle 1 Units by Misc.(Non-Drug; Combo Route) route before meals as needed (SSI). 10/15/24   Vanessa Noel MD   pen needle, diabetic 33 gauge x 5/32" Ndle 1 each by Misc.(Non-Drug; Combo Route) route 3 (three) times daily. 10/15/24   Vanessa Noel MD   pregabalin (LYRICA) 50 MG capsule Take 1 capsule (50 mg total) by mouth 3 (three) times a week. 10/16/24 1/14/25  Vanessa Noel MD   sertraline (ZOLOFT) 100 MG tablet Take 1 tablet (100 mg total) by mouth once daily. 10/15/24 10/15/25  Vanessa Noel MD   sodium bicarbonate 650 MG tablet Take 1 tablet (650 mg total) by mouth 3 (three) times daily. 7/4/24   Frances Bee MD   traZODone (DESYREL) 50 MG tablet Take 0.5 tablets (25 mg total) by mouth every evening. 10/15/24 10/15/25  Vanessa Noel MD   wound dressings (TRIAD WOUND DRESSING) Pste Apply 2 g topically once daily. 5/31/24   Aleta Rod PA-C   lancing device Misc 1 Device by Misc.(Non-Drug; Combo Route) route 2 (two) times daily with meals. 5/7/18 2/24/21  Rosales Barton MD       Family History:  Family History   Problem Relation Name Age of Onset    Diabetes Mother      Diabetes Father      Heart disease Maternal Grandmother      No Known Problems Maternal Grandfather      Diabetes Paternal Grandmother      No Known Problems Paternal Grandfather      Anesthesia problems Neg Hx         Social History:  Social " History     Tobacco Use    Smoking status: Former    Smokeless tobacco: Never   Substance Use Topics    Alcohol use: No    Drug use: Yes     Types: Marijuana     Comment: occassional       Review of Systems:  Pertinent items are noted in HPI. All other systems are reviewed and are negative.     Objective:   Last 24 Hour Vital Signs:  BP  Min: 74/39  Max: 164/99  Temp  Av.1 °F (36.7 °C)  Min: 97.6 °F (36.4 °C)  Max: 99 °F (37.2 °C)  Pulse  Av.8  Min: 60  Max: 101  Resp  Av.5  Min: 10  Max: 22  SpO2  Av %  Min: 71 %  Max: 100 %  I/O last 3 completed shifts:  In: 1157.2 [P.O.:125; Blood:133.1; Other:550; IV Piggyback:349.1]  Out: 2550 [Other:2550]    Physical Examination:  GEN: Patient A&O X 3, well-developed, NAD.  HEENT:  -Head: NC/AT;  -Eyes: No discharge or redness;  -Ears: External ears are normal.  -Nose: Normal nares.  -Mouth and throat: MMM.  CV: RRR, no m/r/g.  LUNGS:Crackles at bilateral bases, no wheezing  ABD: Soft, NT/ND, NBS, no masses or organomegaly.  SKIN: Warm, well perfused. No skin rashes or abnormal lesions.  MSK: EXT: Bilateral AKA with bleed left stump wound  NEURO: No focal deficits  noted      Laboratory:  Trended Lab Data:  Recent Labs     24  0351 24  0534 24  2325 24  0553   WBC  --  10.69 11.70 14.24*   HGB  --  9.4* 6.6* 8.2*   HCT  --  29.5* 20.7* 25.0*   PLT  --  318 299 291   * 130*  --  133*   K 3.9 4.1  --  4.4   CL 93* 92*  --  97   CO2 27 25  --  25   BUN 18 29*  --  30*   CREATININE 2.6* 3.6*  --  3.3*   * 101  --  219*   BILITOT 0.8 0.7  --   --    * 77*  --   --    ALT 41 32  --   --    ALKPHOS 766* 636*  --   --    CALCIUM 8.8 8.6*  --  7.5*   ALBUMIN 2.0* 1.7*  --  1.6*   PROT 8.6* 7.8  --   --    MG 1.9 2.1  --  1.8   PHOS 2.2* 3.1  --  2.9       Cardiac:   Recent Labs   Lab 10/29/24  0853 10/29/24  1031   TROPONINI  --  0.033*   BNP 1,478*  --        Urinalysis:   Lab Results   Component Value Date    LABURIN (A)  06/26/2024     ELIZABETH KRUSEI  50,000 - 99,999 cfu/ml  Treatment of asymptomatic candiduria is not recommended (except for   specific populations). Candida isolated in the urine typically   represents colonization. If an indwelling urinary catheter is present  it should be removed or replaced.      COLORU Orange (A) 06/26/2024    COLORU Banks (A) 06/26/2024    SPECGRAV 1.015 06/26/2024    SPECGRAV 1.015 06/26/2024    NITRITE Negative 06/26/2024    NITRITE Negative 06/26/2024    KETONESU Negative 06/26/2024    KETONESU Negative 06/26/2024    UROBILINOGEN Negative 08/13/2022       Microbiology:  Microbiology Results (last 7 days)       Procedure Component Value Units Date/Time    Blood culture [[id number]] Collected: 10/29/24 0945    Order Status: Completed Specimen: Blood Updated: 11/02/24 1612     Blood Culture, Routine No Growth to date      No Growth to date      No Growth to date      No Growth to date      No Growth to date    Narrative:      Draw sample # 2 from separate site    Blood culture [[id number]] Collected: 10/29/24 0945    Order Status: Completed Specimen: Blood Updated: 11/02/24 1612     Blood Culture, Routine No Growth to date      No Growth to date      No Growth to date      No Growth to date      No Growth to date    Narrative:      Draw sample # 2 from separate site    Respiratory Infection Panel (PCR), Nasopharyngeal [[id number]]  (Abnormal) Collected: 10/29/24 1535    Order Status: Completed Specimen: Nasopharyngeal Swab Updated: 10/30/24 0048     Respiratory Infection Panel Source NP Swab     Adenovirus Not Detected     Coronavirus 229E, Common Cold Virus Not Detected     Coronavirus HKU1, Common Cold Virus Not Detected     Coronavirus NL63, Common Cold Virus Not Detected     Coronavirus OC43, Common Cold Virus Not Detected     Comment: The Coronavirus strains detected in this test cause the common cold.  These strains are not the COVID-19 (novel Coronavirus)strain   associated with the  respiratory disease outbreak.          SARS-CoV2 (COVID-19) Qualitative PCR Detected     Human Metapneumovirus Not Detected     Human Rhinovirus/Enterovirus Not Detected     Influenza A (subtypes H1, H1-2009,H3) Not Detected     Influenza B Not Detected     Parainfluenza Virus 1 Not Detected     Parainfluenza Virus 2 Not Detected     Parainfluenza Virus 3 Not Detected     Parainfluenza Virus 4 Not Detected     Respiratory Syncytial Virus Not Detected     Bordetella Parapertussis (YB6507) Not Detected     Bordetella pertussis (ptxP) Not Detected     Chlamydia pneumoniae Not Detected     Mycoplasma pneumoniae Not Detected    Narrative:      Assay not valid for lower respiratory specimens, alternate  testing required.            Radiology:  EXAMINATION:  XR CHEST 1 VIEW     CLINICAL HISTORY:  low o2;     TECHNIQUE:  Single frontal view of the chest was performed.     COMPARISON:  Chest radiograph 06/21/2024     FINDINGS:  Left-sided central venous catheter with tip in stable position.  Cardiac leads overlie the field of view.  Prior median sternotomy.     Cardiomediastinal silhouette is enlarged in size.  Aortic atherosclerosis.  Mediastinal structures are midline.     Hypoventilatory exam.  Coarsened interstitial lung markings as well as suspected bibasilar atelectasis.  No new focal consolidation, pleural effusion, or pneumothorax.     Osseous structures demonstrate no evidence for acute fracture or osseous destructive lesion.  Degenerative change.     No free intra-abdominal air.  Soft tissues are unremarkable.     Impression:     Please see above.    I have personally reviewed the above labs and imaging.    Current Medications:     Infusions:   NORepinephrine bitartrate-D5W  0-3 mcg/kg/min Intravenous Continuous            Scheduled:   atorvastatin  80 mg Oral Daily    cinacalcet  30 mg Oral Daily with breakfast    insulin glargine U-100  5 Units Subcutaneous QHS    piperacillin-tazobactam (Zosyn) IV (PEDS and  ADULTS) (extended infusion is not appropriate)  4.5 g Intravenous Q12H    pregabalin  50 mg Oral Daily    sertraline  100 mg Oral Daily        PRN:    Current Facility-Administered Medications:     0.9%  NaCl infusion (for blood administration), , Intravenous, Q24H PRN    0.9%  NaCl infusion (for blood administration), , Intravenous, Q24H PRN    0.9% NaCl, , Intravenous, PRN    acetaminophen, 650 mg, Oral, Q4H PRN    dextrose 10%, 12.5 g, Intravenous, PRN    dextrose 10%, 25 g, Intravenous, PRN    glucagon (human recombinant), 1 mg, Intramuscular, PRN    glucose, 16 g, Oral, PRN    glucose, 24 g, Oral, PRN    heparin (porcine), 3,600 Units, Intravenous, PRN    insulin aspart U-100, 0-10 Units, Subcutaneous, QID (AC + HS) PRN    melatonin, 6 mg, Oral, Nightly PRN    naloxone, 0.02 mg, Intravenous, PRN    ondansetron, 4 mg, Intravenous, Q8H PRN    oxyCODONE, 10 mg, Oral, Q12H PRN    polyethylene glycol, 17 g, Oral, PRN    senna-docusate 8.6-50 mg, 1 tablet, Oral, BID PRN    sodium chloride 0.9%, 250 mL, Intravenous, PRN    sodium chloride 0.9%, 10 mL, Intravenous, Q8H PRN    Pharmacy to dose Vancomycin consult, , , Once **AND** vancomycin - pharmacy to dose, , Intravenous, pharmacy to manage frequency     Assessment:     Suyapa Connelly is a 59 y.o. female with:  Patient Active Problem List    Diagnosis Date Noted    Open thigh wound, left, initial encounter 10/28/2024    Cellulitis of left thigh 10/28/2024    Social problem 10/25/2024    Screening for cervical cancer 10/21/2024    Need for vaccination 10/21/2024    Mastodynia 10/21/2024    Need for Tdap vaccination 10/21/2024    Type 2 diabetes mellitus with chronic kidney disease on chronic dialysis, with long-term current use of insulin 10/21/2024    Atherosclerosis of native coronary artery of native heart with angina pectoris 10/21/2024    Breast asymmetry 10/21/2024    Psychophysiological insomnia 10/21/2024    Postconcussive syndrome 06/12/2024    Discharge  planning issues 05/31/2024    Confusion 05/28/2024    Thrombocytopenia 05/14/2024    Severe obesity (BMI >= 40) 05/14/2024    Irritant contact dermatitis due to friction or contact with body fluids, unspecified 05/14/2024    Pressure injury of sacral region, unstageable 05/14/2024    Unresponsive 02/05/2024    Depression 12/26/2023    Hypoglycemia 12/02/2023    Morbid obesity with BMI of 50.0-59.9, adult 05/30/2023    Uremic encephalopathy 05/25/2023    Acute metabolic encephalopathy 05/25/2023    Chronic kidney disease-mineral and bone disorder 05/24/2023    Myalgia 05/12/2023    ESRD (end stage renal disease) 04/21/2023    S/P AKA (above knee amputation) bilateral 03/26/2022    Hyperkalemia 03/26/2022    Uses self-applied continuous glucose monitoring device 03/20/2022    Debility 02/12/2022    Dermatitis associated with moisture 02/07/2022    Myoclonus 01/31/2022    Septic shock 01/17/2022    Impaired eye movement 10/18/2021    Peripheral neuropathic pain 09/22/2021    Muscle wasting and atrophy, not elsewhere classified, right thigh  08/11/2021    Phantom limb syndrome with pain 07/12/2021    Postmenopausal bleeding 06/30/2021    Uterine fibroid 06/30/2021    Chronic combined systolic and diastolic congestive heart failure 06/21/2021    Hypoalbuminemia 06/19/2021    Adjustment disorder with mixed anxiety and depressed mood 06/16/2021    Vascular graft infection 06/01/2021    Encephalopathy 05/20/2021    Uremia     Nephrotic syndrome 05/11/2021    Impaired functional mobility and endurance 05/06/2021    Thrombosis of femoro-popliteal bypass graft 04/29/2021    S/P femoral-popliteal bypass surgery 08/21/2020    Hyponatremia 07/22/2020    Paroxysmal atrial fibrillation 01/28/2020    S/P CABG x 1 01/27/2020    Anemia due to chronic kidney disease, on chronic dialysis 01/27/2020    Type 2 diabetes mellitus with hyperglycemia, with long-term current use of insulin 01/02/2020    CAD (coronary artery disease) 01/02/2020     Acute cystitis without hematuria 06/02/2018    CAROLIN (generalized anxiety disorder) 05/07/2018    Vitamin D deficiency 05/06/2018    Acute encephalopathy 05/05/2018    Hypocalcemia 05/05/2018    Peripheral vascular disease 10/06/2015        Plan:   Neuro:  Alert, awake and oriented  Not requiring any sedation at this time  No neurologic deficits    Cardiovascular:   Hemorrhagic shock resolved with 1 unit PRBC  Not requiring any pressor support  HR and Blood pressure stable at this time  Continue to monitor H&H closely      Pulmonary  Patient saturating well omn Room air  Does not require respiratory support at this time    GI  No issues at this time  Normal bowel movements  PRN Miralax and Senna    Renal  Patient with hx of ESRD  Left Subclavian HD catheter  Nephrology following   HD per nephrology   Electrolytes witthin normal limits today     ID  Continue Vanc and Zosyn for left thigh cellulitis  Blood cultures negative x 2  ID following appreciate recs  Recommend wound care for her wounds on her legs    Heme  Hemorrhagic shock resolved  Hb 8.2 this morning  Trend H&H   General Surg consulted to evaluate left leg stump wound     Endocrine  A1C 5.9  Maintain Blood glucose between 140-180  TSH 3.3     Pain   Pain management per primary team   Recommend long acting pain meds     PPX  DVT ppx on hold due to acute bleeding  Does not need stress ulcer ppx    Lines  PIV, Midline, Left subclavian HD cath    Case discussed with attending physician Dr. Crawford who is in agreement with treatment and plan.     Thank you for allowing us to participate in the care of this patient. Please contact me if you have any questions regarding this consult.    Bob Walton DO  [facility] Fellow

## 2024-11-03 NOTE — SUBJECTIVE & OBJECTIVE
No current facility-administered medications on file prior to encounter.     Current Outpatient Medications on File Prior to Encounter   Medication Sig    acetaminophen (TYLENOL) 500 MG tablet Take 2 tablets (1,000 mg total) by mouth every 8 (eight) hours as needed for Pain. (Patient taking differently: Take 650 mg by mouth every 8 (eight) hours as needed for Pain.)    allopurinoL (ZYLOPRIM) 100 MG tablet Take 0.5 tablets (50 mg total) by mouth every other day.    apixaban (ELIQUIS) 5 mg Tab Take 1 tablet (5 mg total) by mouth 2 (two) times daily.    atorvastatin (LIPITOR) 80 MG tablet Take 1 tablet (80 mg total) by mouth once daily.    blood sugar diagnostic Strp 1 each by Misc.(Non-Drug; Combo Route) route 3 (three) times daily.    blood sugar diagnostic Strp Use to check blood glucose 2 (two) times a day.    blood-glucose meter Misc TEST TWICE DAILY    blood-glucose sensor (DEXCOM G7 SENSOR) Radha 1 each by Misc.(Non-Drug; Combo Route) route once daily.    calcitRIOL (ROCALTROL) 0.5 MCG Cap Take 1 capsule (0.5 mcg total) by mouth once daily.    calcium acetate,phosphat bind, (PHOSLO) 667 mg capsule Take 1 capsule (667 mg total) by mouth 3 (three) times daily with meals.    carvediloL (COREG) 6.25 MG tablet Take 1 tablet (6.25 mg total) by mouth 2 (two) times daily.    clopidogreL (PLAVIX) 75 mg tablet Take 1 tablet (75 mg total) by mouth once daily.    dextrose (GLUTOSE) 40 % gel Take 37.5 mLs (15,000 mg total) by mouth once. for 1 dose    epoetin nina (PROCRIT) 4,000 unit/mL injection Inject 1 mL (4,000 Units total) into the skin every Mon, Wed, Fri.    famotidine (PEPCID) 20 MG tablet Take 1 tablet (20 mg total) by mouth once daily. (Patient not taking: Reported on 10/15/2024)    furosemide (LASIX) 40 MG tablet Take 1 tablet (40 mg total) by mouth 2 (two) times a day.    hydrALAZINE (APRESOLINE) 50 MG tablet Take 1 tablet (50 mg total) by mouth 3 (three) times daily.    insulin aspart U-100 (NOVOLOG) 100 unit/mL  "(3 mL) InPn pen Inject 3 Units into the skin 3 (three) times daily with meals. (Patient not taking: Reported on 10/15/2024)    insulin glargine U-100, Lantus, (LANTUS U-100 INSULIN) 100 unit/mL injection Inject 8 Units into the skin every evening.    lancets 33 gauge Misc TEST 3 TIMES DAILY    multivitamin (ONE DAILY MULTIVITAMIN) per tablet Take 1 tablet by mouth once daily.    pen needle, diabetic 32 gauge x 5/32" Ndle 1 Units by Misc.(Non-Drug; Combo Route) route before meals as needed (SSI).    pen needle, diabetic 33 gauge x 5/32" Ndle 1 each by Misc.(Non-Drug; Combo Route) route 3 (three) times daily.    pregabalin (LYRICA) 50 MG capsule Take 1 capsule (50 mg total) by mouth 3 (three) times a week.    sertraline (ZOLOFT) 100 MG tablet Take 1 tablet (100 mg total) by mouth once daily.    sodium bicarbonate 650 MG tablet Take 1 tablet (650 mg total) by mouth 3 (three) times daily.    traZODone (DESYREL) 50 MG tablet Take 0.5 tablets (25 mg total) by mouth every evening.    wound dressings (TRIAD WOUND DRESSING) Pste Apply 2 g topically once daily.    [DISCONTINUED] lancing device Misc 1 Device by Misc.(Non-Drug; Combo Route) route 2 (two) times daily with meals.       Review of patient's allergies indicates:   Allergen Reactions    Ciprofloxacin Itching    Iodine      Kidney injury    Pcn [penicillins]      Rash; tolerated ceftriaxone on 1/13/20       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    Osteomyelitis     Osteomyelitis of left foot 04/30/2021    Ulcer of left foot     Vaginal delivery     x1     Past Surgical History:   Procedure Laterality Date    ABOVE-KNEE AMPUTATION Left 5/18/2021    Procedure: AMPUTATION, ABOVE KNEE;  Surgeon: " Teddy Huber MD;  Location: [address];  Service: Vascular;  Laterality: Left;    ABOVE-KNEE AMPUTATION Right 3/18/2022    Procedure: AMPUTATION, ABOVE KNEE;  Surgeon: DAYNE Florez II, MD;  Location: [address];  Service: Vascular;  Laterality: Right;    Angiogram - Right Extremity Right 7/9/15    angiogram-left leg  10/6/15    ANGIOGRAPHY OF LOWER EXTREMITY Left 4/29/2021    Procedure: ANGIOGRAM, LOWER EXTREMITY;  Surgeon: Teddy Huber MD;  Location: [address];  Service: Vascular;  Laterality: Left;    BELOW KNEE AMPUTATION OF LOWER EXTREMITY Right 12/28/2021    Procedure: AMPUTATION, BELOW KNEE;  Surgeon: Kaitlyn Rojas MD;  Location: [facility];  Service: General;  Laterality: Right;    CATHETERIZATION OF BOTH LEFT AND RIGHT HEART N/A 12/18/2019    Procedure: CATHETERIZATION, HEART, BOTH LEFT AND RIGHT;  Surgeon: Que Fernando III, MD;  Location: [facility] CATH LAB;  Service: Cardiology;  Laterality: N/A;    CORONARY ANGIOGRAPHY N/A 12/18/2019    Procedure: ANGIOGRAM, CORONARY ARTERY;  Surgeon: Que Fernando III, MD;  Location: [facility] CATH LAB;  Service: Cardiology;  Laterality: N/A;    CORONARY ANGIOGRAPHY INCLUDING BYPASS GRAFTS WITH CATHETERIZATION OF LEFT HEART N/A 7/28/2020    Procedure: ANGIOGRAM, CORONARY, INCLUDING BYPASS GRAFT, WITH LEFT HEART CATHETERIZATION, 9 am;  Surgeon: Rachel Easley MD;  Location: [facility] CATH LAB;  Service: Cardiology;  Laterality: N/A;    CORONARY ARTERY BYPASS GRAFT (CABG) N/A 1/14/2020    Procedure: CORONARY ARTERY BYPASS GRAFT (CABG) x 1     Off Pump;  Surgeon: Huang Altamirano MD;  Location: [address];  Service: Cardiovascular;  Laterality: N/A;    CREATION OF FEMORAL-TIBIAL ARTERY BYPASS Left 4/29/2021    Procedure: CREATION, BYPASS, ARTERIAL, FEMORAL TO ANTERIOR TIBIAL;  Surgeon: Teddy Huber MD;  Location: [address];  Service: Vascular;  Laterality: Left;    CREATION OF FEMOROPOPLITEAL ARTERIAL BYPASS USING  GRAFT Left 8/18/2020    Procedure: CREATION, BYPASS, ARTERIAL, FEMORAL TO POPLITEAL, USING GRAFT, LEFT LOWER EXTREMITY;  Surgeon: Teddy Huber MD;  Location: [facility] OR;  Service: Vascular;  Laterality: Left;  REQUEST 7:15 A.M. START----COVID NEGATIVE ON 8/17  1ST CASE STARTE PER LEANA ON 8/7/2020 @ 942AM-LO  RN PREOP 8/12/2020   T/S-----CLEARED BY CARDS-------PENDING INSURANCE    DEBRIDEMENT OF FOOT Left 9/8/2020    Procedure: DEBRIDEMENT, FOOT;  Surgeon: Rosio Mayes DPM;  Location: [facility] OR;  Service: Podiatry;  Laterality: Left;  request neoxx .   RN Pre Op 9-4-2020, Covid negative on 9/5/20. C A    DEBRIDEMENT OF FOOT  3/4/2021    Procedure: DEBRIDEMENT, FOOT;  Surgeon: Teddy Huber MD;  Location: [facility] OR;  Service: Vascular;;    DEBRIDEMENT OF FOOT Left 3/9/2021    Procedure: DEBRIDEMENT, FOOT, bone biopsy;  Surgeon: Rosio Mayes DPM;  Location: [facility] OR;  Service: Podiatry;  Laterality: Left;  Request neoxx---COVID IN AM  REQUESTING NOON START  RN Phone Pre op.On Blood thinners Plavix and Eliquis.  Covid am of surgery. C A    DEBRIDEMENT OF FOOT Left 5/4/2021    Procedure: DEBRIDEMENT, FOOT;  Surgeon: Farooq Morley DPM;  Location: [address];  Service: Podiatry;  Laterality: Left;    INSERTION OF TUNNELED CENTRAL VENOUS HEMODIALYSIS CATHETER N/A 1/27/2020    Procedure: Insertion, Catheter, Central Venous, Hemodialysis;  Surgeon: ESTEBAN Gomez III, MD;  Location: [facility] CATH LAB;  Service: Peripheral Vascular;  Laterality: N/A;    INSERTION OF TUNNELED CENTRAL VENOUS HEMODIALYSIS CATHETER  5/10/2023    Procedure: Insertion, Catheter, Central Venous, Hemodialysis;  Surgeon: Romulo Queen MD;  Location: [facility] CATH LAB;  Service: Cardiology;;    PERCUTANEOUS TRANSLUMINAL ANGIOPLASTY N/A 3/4/2021    Procedure: PTA (ANGIOPLASTY, PERCUTANEOUS, TRANSLUMINAL);  Surgeon: Teddy Huber MD;  Location: [facility] OR;  Service: Vascular;  Laterality: N/A;    REMOVAL OF ARTERIOVENOUS GRAFT  Left 5/27/2021    Procedure: REMOVAL, GRAFT, LEFT LOWER EXTREMITY, WOUND EXPLORATION;  Surgeon: Teddy Huber MD;  Location: [facility] OR 2ND FLR;  Service: Vascular;  Laterality: Left;    REMOVAL OF NAIL OF DIGIT Left 3/9/2021    Procedure: REMOVAL, NAIL, DIGIT;  Surgeon: Rosio Mayes DPM;  Location: [facility] OR;  Service: Podiatry;  Laterality: Left;    RIGHT HEART CATHETERIZATION Right 5/10/2023    Procedure: INSERTION, CATHETER, RIGHT HEART;  Surgeon: Romulo Queen MD;  Location: [facility] CATH LAB;  Service: Cardiology;  Laterality: Right;    THROMBECTOMY Left 3/4/2021    Procedure: THROMBECTOMY, LEFT LOWER EXTREMITY BYPASS GRAFT, ANGIOGRAM, POSSIBLE INTERVENTION, POSSIBLE LEFT LOWER EXTREMITY BYPASS;  Surgeon: Teddy Huber MD;  Location: [facility] OR;  Service: Vascular;  Laterality: Left;    THROMBECTOMY Left 4/29/2021    Procedure: GRAFT THROMBECTOMY, LEFT LOWER EXTREMITY;  Surgeon: Teddy Huber MD;  Location: [facility] OR 2ND FLR;  Service: Vascular;  Laterality: Left;  14.5 min  1179.85 mGy  341.01 Gycm2  240 ml dye    THROMBECTOMY  10/22/2021    Procedure: THROMBECTOMY;  Surgeon: Saad Arenas MD;  Location: [facility] CATH LAB/EP;  Service: Cardiology;;     Family History       Problem Relation (Age of Onset)    Diabetes Mother, Father, Paternal Grandmother    Heart disease Maternal Grandmother    No Known Problems Maternal Grandfather, Paternal Grandfather          Tobacco Use    Smoking status: Former    Smokeless tobacco: Never   Substance and Sexual Activity    Alcohol use: No    Drug use: Yes     Types: Marijuana     Comment: occassional    Sexual activity: Yes     Partners: Male     Review of Systems   Reason unable to perform ROS: does not respond appropriately to questions.     Objective:     Vital Signs (Most Recent):  Temp: 98.1 °F (36.7 °C) (11/03/24 1121)  Pulse: 92 (11/03/24 1121)  Resp: 14 (11/03/24 1121)  BP: (!) 102/52 (11/03/24 1121)  SpO2: 97 % (11/03/24 1121) Vital Signs (24h  Range):  Temp:  [97.6 °F (36.4 °C)-99 °F (37.2 °C)] 98.1 °F (36.7 °C)  Pulse:  [] 92  Resp:  [10-22] 14  SpO2:  [71 %-100 %] 97 %  BP: ()/(32-88) 102/52     Weight: 68 kg (150 lb)  Body mass index is 45.77 kg/m².     Physical Exam  Constitutional:       General: She is not in acute distress.     Appearance: She is ill-appearing.   HENT:      Head: Normocephalic and atraumatic.   Pulmonary:      Effort: Pulmonary effort is normal. No respiratory distress.   Musculoskeletal:      Comments: B/l AKA   Skin:     Comments: Patient's L stump incision is broken down with some necrotic appearing skin. 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  CBC:   Recent Labs   Lab 11/03/24  1024   WBC 15.75*   RBC 2.30*   HGB 7.3*   HCT 21.5*      MCV 94   MCH 31.7*   MCHC 34.0     CMP:   Recent Labs   Lab 11/02/24  0534 11/03/24  0553    219*   CALCIUM 8.6* 7.5*   ALBUMIN 1.7* 1.6*   PROT 7.8  --    * 133*   K 4.1 4.4   CO2 25 25   CL 92* 97   BUN 29* 30*   CREATININE 3.6* 3.3*   ALKPHOS 636*  --    ALT 32  --    AST 77*  --    BILITOT 0.7  --        Significant Diagnostics:  I have reviewed all pertinent imaging results/findings within the past 24 hours.

## 2024-11-03 NOTE — PROGRESS NOTES
[facility] Medicine  Progress Note    Patient Name: Suyapa Connelly  MRN: [id number]  Patient Class: IP- Inpatient   Admission Date: 10/28/2024  Length of Stay: 6 days  Attending Physician: Shae Starkey MD  Primary Care Provider: Vanessa Noel MD      Subjective:     Principal Problem:Cellulitis of left thigh      HPI:  Patient is a 59-year-old female with a known past medical history of hypertension, hyperlipidemia, diabetes mellitus type 2, ESRD on HD, and deconditioning that presents the ER after being on floor throughout the evening.     Patient presents after being found on the floor by a neighbor today.  Patient reports that she got out of bed to change her pad and diaper but was unable to get back into bed, staying on the floor throughout the evening.  Patient's  who normally cares for her, was arrested yesterday and is in prison and patient was unable to care for herself.  When patient was found by neighbor on floor, EMS was called and patient was brought to the ER for further evaluation.  On arrival to ER, patient in no acute distress.  Labs essentially within normal limits.  Patient last dialyzed on Thursday and no signs of fluid overload or electrolyte abnormalities.  Patient has no family NS able to assist her at home other than her  who is currently incarcerated.  Patient admitted for social problems and hemodialysis.    Patient due to her open wounds and CT finding of her left thigh - emphysema can not rule out infection -     Overview/Hospital Course:  No notes on file    Interval History: hypotensive last night-- bleeding from stump. Hemoglobin dropped 9-->6.  On Eliquis. She received Kcentra. Transfused 2 units of blood.  General surgery consulted.    Patient c/o pain in angela, L>R. Pain meds held due to hypotension.    Review of Systems   Constitutional:  Negative for chills and fever.   Respiratory:  Negative for shortness of breath.     Cardiovascular:  Negative for chest pain.   Gastrointestinal:  Negative for abdominal pain.   Genitourinary:  Negative for dysuria.   Musculoskeletal:  Positive for arthralgias.   Neurological:  Negative for headaches.   Psychiatric/Behavioral:  Negative for confusion.      Objective:     Vital Signs (Most Recent):  Temp: 98.1 °F (36.7 °C) (11/03/24 0715)  Pulse: 90 (11/03/24 0900)  Resp: 14 (11/03/24 0900)  BP: (!) 116/48 (11/03/24 0900)  SpO2: (!) 71 % (11/03/24 0900) Vital Signs (24h Range):  Temp:  [97.6 °F (36.4 °C)-99 °F (37.2 °C)] 98.1 °F (36.7 °C)  Pulse:  [] 90  Resp:  [10-22] 14  SpO2:  [71 %-100 %] 71 %  BP: ()/(32-99) 116/48     Weight: 68 kg (150 lb)  Body mass index is 45.77 kg/m².    Intake/Output Summary (Last 24 hours) at 11/3/2024 1007  Last data filed at 11/3/2024 0900  Gross per 24 hour   Intake 1082.11 ml   Output 2550 ml   Net -1467.89 ml         Physical Exam  Vitals and nursing note reviewed.   Constitutional:       General: She is awake. She is not in acute distress.     Appearance: She is well-developed. She is ill-appearing (chronically).   HENT:      Head: Normocephalic and atraumatic.   Eyes:      Conjunctiva/sclera: Conjunctivae normal.   Neck:      Vascular: No JVD.   Cardiovascular:      Rate and Rhythm: Normal rate and regular rhythm.      Heart sounds: Normal heart sounds.   Pulmonary:      Effort: Pulmonary effort is normal.      Breath sounds: Normal breath sounds.   Abdominal:      General: Bowel sounds are normal. There is no distension.      Palpations: Abdomen is soft.      Tenderness: There is no abdominal tenderness.   Musculoskeletal:      Cervical back: Neck supple.      Right lower leg: No edema.      Left lower leg: No edema.      Comments: B/L AKA  R stump swelling and mild TTP, L stump with pressure dressing (ACE wrap)     Neurological:      Mental Status: She is alert.   Psychiatric:         Mood and Affect: Affect is tearful.         Behavior: Behavior  normal.             Significant Labs: All pertinent labs within the past 24 hours have been reviewed.  CBC:   Recent Labs   Lab 11/02/24  0534 11/02/24  2325 11/03/24  0553   WBC 10.69 11.70 14.24*   HGB 9.4* 6.6* 8.2*   HCT 29.5* 20.7* 25.0*    299 291     CMP:   Recent Labs   Lab 11/02/24  0534 11/03/24  0553   * 133*   K 4.1 4.4   CL 92* 97   CO2 25 25    219*   BUN 29* 30*   CREATININE 3.6* 3.3*   CALCIUM 8.6* 7.5*   PROT 7.8  --    ALBUMIN 1.7* 1.6*   BILITOT 0.7  --    ALKPHOS 636*  --    AST 77*  --    ALT 32  --    ANIONGAP 13 11       Significant Imaging:  no new    Assessment/Plan:      * Cellulitis of left thigh  L AKA- necrotic amputation surgical site- developing eschar 9x15x0.2cm and blisters to periwound  Continue IV antibiotics   General surgery consulted for wound debridement. Recs: continue recommendations per Wound Care   - Aquacel AG daily to L AKA        ID recs:   Continue vancomycin/ piperacillin tazobactam  Given one dose of metronidazole.  Difficult to determine length of therapy at this time but related to treatment response. Unfortunately, the aspect of the ischemic lesions complicate assessment of recovery.  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  Patient is status post CABG as well as stents that have clotted in her lower extremities    Pain control-- continue current regimen as BP allows  Cautious with narcs secondary to bradypnea/ decreased respiratory rate    Anemia due to chronic kidney disease, on chronic dialysis  Anemia is likely due to acute blood loss which was from bleeding from L stump wound . Most recent hemoglobin and hematocrit are listed below.  Recent Labs     11/03/24  0553 11/03/24  1024 11/03/24  1432   HGB 8.2* 7.3* 7.8*   HCT 25.0* 21.5* 23.0*     Plan  - Monitor serial CBC: Daily-- sooner if re bleeding occurs  - Transfuse PRBC if patient becomes hemodynamically unstable, symptomatic or H/H drops below 7/21.  - Patient has  received 3 units of PRBCs on 11/3  - Patient's anemia is currently stable  - acute on chronic anemia due to bleed from L stump wound. Hgb recovering from transfusions started overnight.     Pressure injury of sacral region, unstageable  Wound care    - Pressure injury prevention interventions   - Triad ointment BID to sacrum/buttocks      Morbid obesity with BMI of 50.0-59.9, adult  Body mass index is 45.77 kg/m². Morbid obesity complicates all aspects of disease management from diagnostic modalities to treatment.           S/P AKA (above knee amputation) bilateral  Fall precautions     Septic shock  This patient had shock. The type of shock is distributive due to sepsis. She was admitted to the intensive care unit 10/29.   Patient did not require IVF resuscitation or pressors. BP recovered.  Stepped down 10/31.   Continue IV abx.      Chronic combined systolic and diastolic congestive heart failure  Patient has Combined Systolic and Diastolic heart failure that is Chronic. On presentation their CHF was well compensated. Most recent BNP and echo results are listed below.  Recent Labs     10/29/24  0853   BNP 1,478*     Latest ECHO  Results for orders placed during the hospital encounter of 05/07/24    Echo    Interpretation Summary    Left Ventricle: The left ventricle is mildly dilated. Ventricular mass is normal. Normal wall thickness. Severe global hypokinesis present. There is severely reduced systolic function. Ejection fraction by visual approximation is 15%. There is diastolic dysfunction.    Right Ventricle: Severe right ventricular enlargement. Wall thickness is normal. Right ventricle wall motion has global hypokinesis. Systolic function is mildly reduced.    Left Atrium: Left atrium is severely dilated. There is an atrial septal aneurysm.    Right Atrium: Right atrium is dilated.    Aortic Valve: There is mild aortic regurgitation.    Mitral Valve: There is mild regurgitation.    Tricuspid Valve: There is  annular dilation present. There is normal leaflet mobility. There is severe functional regurgitation.    Pulmonary Artery: The estimated pulmonary artery systolic pressure is at least 24 mmHg. PASP is likely under-estimated in the setting of severe tricuspid regurgitation.    IVC/SVC: Central venous pressure of at least 8 mmHg.    No vegetation seen.    Current Heart Failure Medications       Plan  - Monitor strict I&Os and daily weights.    - Place on telemetry  - Low sodium diet  - Place on fluid restriction of 1 L.   - Cardiology has not been consulted  - The patient's volume status is stable but not at their baseline as indicated by edema- UF with HD as tolerated. Lasix DC'd 10/30 due to low BP        Peripheral vascular disease  On Eliquis/ plavix--- held due to bleed      VTE Risk Mitigation (From admission, onward)           Ordered     IP VTE HIGH RISK PATIENT  Once         10/28/24 2114     Place sequential compression device  Until discontinued         10/28/24 2114     Reason for No Pharmacological VTE Prophylaxis  Once        Question:  Reasons:  Answer:  Already adequately anticoagulated on oral Anticoagulants    10/28/24 2114     heparin (porcine) injection 3,600 Units  As needed (PRN)         10/28/24 2114                    Discharge Planning   DEVAN: 11/8/2024     Code Status: Full Code   Is the patient medically ready for discharge?:     Reason for patient still in hospital (select all that apply): Patient trending condition, Treatment, and Consult recommendations  Discharge Plan A: Skilled Nursing Facility          Shae Starkey MD  Department of Hospital Medicine   [place] - Intensive Care

## 2024-11-03 NOTE — CARE UPDATE
Spoke to patient's mother Tea Sal and obtained telephone consent for blood products. Mother also updated on current condition and plan of care.    Willow Velásquez, [facility]  Department of [facility] Medicine  [facility]

## 2024-11-03 NOTE — EICU
EICU BRIEF INITIAL EVALUATION:       HISTORY:      59-year-old woman admitted to ICU for hemorrhagic shock due to bleeding from AKA stump.  She was on Eliquis.  She received Kcentra and 2 units PRBCs for hemoglobin 6.6  History of peripheral vascular disease, combined CHF, bilateral above knee amputations, cellulitis left thigh, ESRD, panic attacks, hypertension, depression, anxiety, syncope, type 2 diabetes, coronary artery disease status post CABG x1, anemia, status post femoral popliteal bypass, graft infection    DVT prophylaxis-none.  Bilateral AK amputations, active bleeding  GI prophylaxis not indicated    BP 90/54 (65), P 84, RR 20, O2 sat 100  Resting comfortably on room air      CAMERA ASSESSMENT: Yes      TELEMETRY: Reviewed      NOTES: Reviewed     LABS: Reviewed      IMAGING: Personally reviewed.      DISCUSSED with bedside nurse        ASSESSMENT AND PLAN:       Acute postsurgical hemorrhage-appears resolved-continue ICU monitoring, serial hemoglobin. Bleeding appears to have resolved, receiving 2nd unit PRBCs.  Will repeat labs after 2nd unit is completed    Left AKA stump infection-CT with subcutaneous emphysema.  Would get surgery re-evaluation.      I have reviewed the plan of care for the patient and agree with the plan of care unless noted otherwise above.      JONN Almanzar MD  eICU Attending  [phone]    This report has been created through the use of [employer] dictation software. Typographical and content errors may occur with this process. While efforts are made to detect and correct such errors, in some cases errors will persist. For this reason, wording in this document should be considered in the proper context and not strictly verbatim.

## 2024-11-03 NOTE — PROGRESS NOTES
[facility] ID note    Patient afebrile.  WBC 15.8.  On vancomycin and piperacillin tazobactam.  Antibiotic day 6 today  Moved to ICU because drop in hemoglobin    COVID-19 positive 10/29/2024  Blood cultures negative    Continue vancomycin and piperacillin tazobactam for now for presumed left thigh stump infection initially with foul-smelling drainage consistent with Gram-negative rods and anaerobes.  No cultures were obtained.  Length of therapy to be determined that would reassess at 8-10 days days    We will monitor at a distance    Please call if any questions   Juarez Gamboa  [facility] ID  [phone]

## 2024-11-03 NOTE — NURSING
Rapid Response Nurse Follow-up Note     Notified by bedside RNAlona of hypotension and continued bleeding from stump wound. PRBC started 15-20 minutes ago as well Kcentra given around the same time. VERONICA Carnes NP notified and to bedside to assess pt. BP 74/39, HR 82. Dressing and pads saturated with blood. Decision made to increase blood rate to 250/hr and transfer to ICU for closer monitoring and possible need for levophed. Recommended surgery consult.     Reviewed plan of care with Charge RNMatilda  Please call Rapid Response RN, Stacey Dillon RN with any questions or concerns at [phone].

## 2024-11-03 NOTE — NURSING
Upon assessment, left stump saturated with blood through dressing. BP 82/46(59). Dr. Starkey and Dr. Kuo with gen surg notified. STAT CBC collected. 500cc LR bolus initiated and levo gtt restarted per Dr. Crawford. 1 unit PRBC ordered to transfuse. Compression dressing re-enforced. Dr. Kuo to bedside for assessment.

## 2024-11-03 NOTE — PROGRESS NOTES
Progress Note  Nephrology      Consult Requested By: Shae Starkey MD      SUBJECTIVE:     Overnight events  Patient is a 59 y.o. female     Patient Active Problem List   Diagnosis    Peripheral vascular disease    Acute encephalopathy    Hypocalcemia    Vitamin D deficiency    CAROLIN (generalized anxiety disorder)    Acute cystitis without hematuria    Type 2 diabetes mellitus with hyperglycemia, with long-term current use of insulin    CAD (coronary artery disease)    S/P CABG x 1    Anemia due to chronic kidney disease, on chronic dialysis    Paroxysmal atrial fibrillation    Hyponatremia    S/P femoral-popliteal bypass surgery    Thrombosis of femoro-popliteal bypass graft    Impaired functional mobility and endurance    Nephrotic syndrome    Encephalopathy    Uremia    Vascular graft infection    Hypoalbuminemia    Chronic combined systolic and diastolic congestive heart failure    Postmenopausal bleeding    Uterine fibroid    Muscle wasting and atrophy, not elsewhere classified, right thigh     Adjustment disorder with mixed anxiety and depressed mood    Phantom limb syndrome with pain    Peripheral neuropathic pain    Impaired eye movement    Septic shock    Myoclonus    Dermatitis associated with moisture    Uses self-applied continuous glucose monitoring device    S/P AKA (above knee amputation) bilateral    Hyperkalemia    Debility    ESRD (end stage renal disease)    Myalgia    Chronic kidney disease-mineral and bone disorder    Uremic encephalopathy    Acute metabolic encephalopathy    Morbid obesity with BMI of 50.0-59.9, adult    Hypoglycemia    Depression    Unresponsive    Thrombocytopenia    Severe obesity (BMI >= 40)    Irritant contact dermatitis due to friction or contact with body fluids, unspecified    Pressure injury of sacral region, unstageable    Confusion    Discharge planning issues    Postconcussive syndrome    Screening for cervical cancer    Need for vaccination    Mastodynia     Need for Tdap vaccination    Type 2 diabetes mellitus with chronic kidney disease on chronic dialysis, with long-term current use of insulin    Atherosclerosis of native coronary artery of native heart with angina pectoris    Breast asymmetry    Psychophysiological insomnia    Social problem    Open thigh wound, left, initial encounter    Cellulitis of left thigh     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    Osteomyelitis     Osteomyelitis of left foot 04/30/2021    Ulcer of left foot     Vaginal delivery     x1              OBJECTIVE:     Vitals:    11/03/24 1115 11/03/24 1121 11/03/24 1130 11/03/24 1145   BP: (!) 103/51 (!) 102/52 (!) 88/50 (!) 124/55   BP Location:       Patient Position:       Pulse: 90 92 91 93   Resp: 19 14 14 17   Temp:  98.1 °F (36.7 °C)     TempSrc:  Oral     SpO2: 96% 97% 99% 97%   Weight:       Height:           Temp: 98.1 °F (36.7 °C) (11/03/24 1121)  Pulse: 93 (11/03/24 1145)  Resp: 17 (11/03/24 1145)  BP: (!) 124/55 (11/03/24 1145)  SpO2: 97 % (11/03/24 1145)    Date 11/03/24 0700 - 11/04/24 0659   Shift 2586-2921 1294-6925 0138-0873 24 Hour Total   INTAKE   I.V.(mL/kg) 8.5(0.1)   8.5(0.1)   IV Piggyback 769.5   769.5   Shift Total(mL/kg) 778(11.4)   778(11.4)   OUTPUT   Shift Total(mL/kg)       Weight (kg) 68 68 68 68             Medications:   atorvastatin  80 mg Oral Daily    cinacalcet  30 mg Oral Daily with breakfast    insulin glargine U-100  5 Units Subcutaneous QHS    piperacillin-tazobactam (Zosyn) IV (PEDS and ADULTS) (extended infusion is not appropriate)  4.5 g Intravenous Q12H    pregabalin  50 mg Oral Daily    sertraline  100 mg Oral Daily    silver nitrate applicators  4 applicator Topical (Top)  "Once      NORepinephrine bitartrate-D5W  0-3 mcg/kg/min Intravenous Continuous   Stopped at 11/03/24 1104               Physical Exam:  General appearance: weak  Lungs: RR 17   Pulse oximeter 97 % O2  Heart: Pulse 93  Abdomen: soft  Extremities: edema  Skin: dry    Laboratory:  ABG  Labs reviewed  No results found for this or any previous visit (from the past 2 weeks).  Recent Results (from the past 2 weeks)   CBC auto differential    Collection Time: 11/03/24 10:24 AM   Result Value Ref Range    WBC 15.75 (H) 3.90 - 12.70 K/uL    Hemoglobin 7.3 (L) 12.0 - 16.0 g/dL    Hematocrit 21.5 (L) 37.0 - 48.5 %    Platelets 278 150 - 450 K/uL   CBC Auto Differential    Collection Time: 11/03/24  5:53 AM   Result Value Ref Range    WBC 14.24 (H) 3.90 - 12.70 K/uL    Hemoglobin 8.2 (L) 12.0 - 16.0 g/dL    Hematocrit 25.0 (L) 37.0 - 48.5 %    Platelets 291 150 - 450 K/uL   CBC auto differential    Collection Time: 11/02/24 11:25 PM   Result Value Ref Range    WBC 11.70 3.90 - 12.70 K/uL    Hemoglobin 6.6 (L) 12.0 - 16.0 g/dL    Hematocrit 20.7 (L) 37.0 - 48.5 %    Platelets 299 150 - 450 K/uL     Urinalysis  No results for input(s): "COLORU", "CLARITYU", "SPECGRAV", "PHUR", "PROTEINUA", "GLUCOSEU", "BILIRUBINCON", "BLOODU", "WBCU", "RBCU", "BACTERIA", "MUCUS", "NITRITE", "LEUKOCYTESUR", "UROBILINOGEN", "HYALINECASTS" in the last 24 hours.    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

## 2024-11-03 NOTE — NURSING
@8024: Dr. Starkey notified of pt's increasing pain despite tylenol; orders received for one-time dose of 2mg morphine

## 2024-11-04 LAB
ALBUMIN SERPL BCP-MCNC: 1.8 G/DL (ref 3.5–5.2)
ANION GAP SERPL CALC-SCNC: 17 MMOL/L (ref 8–16)
BUN SERPL-MCNC: 48 MG/DL (ref 6–20)
CALCIUM SERPL-MCNC: 7.8 MG/DL (ref 8.7–10.5)
CHLORIDE SERPL-SCNC: 97 MMOL/L (ref 95–110)
CO2 SERPL-SCNC: 18 MMOL/L (ref 23–29)
CREAT SERPL-MCNC: 4.2 MG/DL (ref 0.5–1.4)
ERYTHROCYTE [DISTWIDTH] IN BLOOD BY AUTOMATED COUNT: 20.1 % (ref 11.5–14.5)
EST. GFR  (NO RACE VARIABLE): 12 ML/MIN/1.73 M^2
GLUCOSE SERPL-MCNC: 168 MG/DL (ref 70–110)
HCT VFR BLD AUTO: 25.8 % (ref 37–48.5)
HGB BLD-MCNC: 8 G/DL (ref 12–16)
MCH RBC QN AUTO: 30.9 PG (ref 27–31)
MCHC RBC AUTO-ENTMCNC: 31 G/DL (ref 32–36)
MCV RBC AUTO: 100 FL (ref 82–98)
OB PNL STL: POSITIVE
PHOSPHATE SERPL-MCNC: 2.8 MG/DL (ref 2.7–4.5)
PLATELET # BLD AUTO: 290 K/UL (ref 150–450)
PMV BLD AUTO: 11.5 FL (ref 9.2–12.9)
POCT GLUCOSE: 132 MG/DL (ref 70–110)
POCT GLUCOSE: 139 MG/DL (ref 70–110)
POCT GLUCOSE: 162 MG/DL (ref 70–110)
POCT GLUCOSE: 189 MG/DL (ref 70–110)
POTASSIUM SERPL-SCNC: 4.8 MMOL/L (ref 3.5–5.1)
RBC # BLD AUTO: 2.59 M/UL (ref 4–5.4)
SODIUM SERPL-SCNC: 132 MMOL/L (ref 136–145)
VANCOMYCIN SERPL-MCNC: 15.3 UG/ML
WBC # BLD AUTO: 15.2 K/UL (ref 3.9–12.7)

## 2024-11-04 PROCEDURE — 80202 ASSAY OF VANCOMYCIN: CPT | Mod: HCNC | Performed by: FAMILY MEDICINE

## 2024-11-04 PROCEDURE — 36415 COLL VENOUS BLD VENIPUNCTURE: CPT | Mod: HCNC | Performed by: FAMILY MEDICINE

## 2024-11-04 PROCEDURE — 94761 N-INVAS EAR/PLS OXIMETRY MLT: CPT | Mod: HCNC

## 2024-11-04 PROCEDURE — 25000003 PHARM REV CODE 250: Mod: HCNC | Performed by: INTERNAL MEDICINE

## 2024-11-04 PROCEDURE — 27000207 HC ISOLATION: Mod: HCNC

## 2024-11-04 PROCEDURE — 85027 COMPLETE CBC AUTOMATED: CPT | Mod: HCNC | Performed by: FAMILY MEDICINE

## 2024-11-04 PROCEDURE — 25000003 PHARM REV CODE 250: Mod: HCNC | Performed by: FAMILY MEDICINE

## 2024-11-04 PROCEDURE — 63600175 PHARM REV CODE 636 W HCPCS: Mod: HCNC | Performed by: FAMILY MEDICINE

## 2024-11-04 PROCEDURE — 25000003 PHARM REV CODE 250: Mod: HCNC

## 2024-11-04 PROCEDURE — 80069 RENAL FUNCTION PANEL: CPT | Mod: HCNC | Performed by: STUDENT IN AN ORGANIZED HEALTH CARE EDUCATION/TRAINING PROGRAM

## 2024-11-04 PROCEDURE — 80100014 HC HEMODIALYSIS 1:1: Mod: HCNC

## 2024-11-04 PROCEDURE — 99232 SBSQ HOSP IP/OBS MODERATE 35: CPT | Mod: HCNC,,, | Performed by: STUDENT IN AN ORGANIZED HEALTH CARE EDUCATION/TRAINING PROGRAM

## 2024-11-04 PROCEDURE — 82272 OCCULT BLD FECES 1-3 TESTS: CPT | Mod: HCNC | Performed by: FAMILY MEDICINE

## 2024-11-04 PROCEDURE — 36415 COLL VENOUS BLD VENIPUNCTURE: CPT | Mod: HCNC | Performed by: STUDENT IN AN ORGANIZED HEALTH CARE EDUCATION/TRAINING PROGRAM

## 2024-11-04 PROCEDURE — 63600175 PHARM REV CODE 636 W HCPCS: Mod: HCNC | Performed by: INTERNAL MEDICINE

## 2024-11-04 PROCEDURE — 20000000 HC ICU ROOM: Mod: HCNC

## 2024-11-04 RX ORDER — INSULIN GLARGINE 100 [IU]/ML
10 INJECTION, SOLUTION SUBCUTANEOUS NIGHTLY
Status: DISCONTINUED | OUTPATIENT
Start: 2024-11-04 | End: 2024-11-05

## 2024-11-04 RX ORDER — CINACALCET 30 MG/1
30 TABLET, FILM COATED ORAL
Status: DISCONTINUED | OUTPATIENT
Start: 2024-11-08 | End: 2024-11-12 | Stop reason: HOSPADM

## 2024-11-04 RX ADMIN — INSULIN ASPART 2 UNITS: 100 INJECTION, SOLUTION INTRAVENOUS; SUBCUTANEOUS at 08:11

## 2024-11-04 RX ADMIN — INSULIN GLARGINE 10 UNITS: 100 INJECTION, SOLUTION SUBCUTANEOUS at 08:11

## 2024-11-04 RX ADMIN — PIPERACILLIN SODIUM AND TAZOBACTAM SODIUM 4.5 G: 4; .5 INJECTION, POWDER, LYOPHILIZED, FOR SOLUTION INTRAVENOUS at 04:11

## 2024-11-04 RX ADMIN — SERTRALINE HYDROCHLORIDE 100 MG: 100 TABLET ORAL at 09:11

## 2024-11-04 RX ADMIN — OXYCODONE HYDROCHLORIDE 10 MG: 10 TABLET, FILM COATED, EXTENDED RELEASE ORAL at 03:11

## 2024-11-04 RX ADMIN — ATORVASTATIN CALCIUM 80 MG: 40 TABLET, FILM COATED ORAL at 09:11

## 2024-11-04 RX ADMIN — VANCOMYCIN HYDROCHLORIDE 500 MG: 500 INJECTION, POWDER, LYOPHILIZED, FOR SOLUTION INTRAVENOUS at 03:11

## 2024-11-04 RX ADMIN — INSULIN ASPART 2 UNITS: 100 INJECTION, SOLUTION INTRAVENOUS; SUBCUTANEOUS at 04:11

## 2024-11-04 RX ADMIN — PREGABALIN 50 MG: 50 CAPSULE ORAL at 09:11

## 2024-11-04 NOTE — PROGRESS NOTES
Pharmacokinetic Assessment Follow Up: IV Vancomycin    Vancomycin serum concentration assessment(s):    The random level was drawn correctly and can be used to guide therapy at this time. The measurement is above the desired definitive target range of 10 to 15 mcg/mL.    Vancomycin Regimen Plan:    Will give one time dose of vancomycin 500 mg post HD today and will recheck vancomycin random with AM labs. Will follow up HD plan daily.     Drug levels (last 3 results):  Recent Labs   Lab Result Units 11/02/24  0534 11/03/24  0554 11/04/24  0521   Vancomycin, Random ug/mL 21.8 15.5 15.3       Pharmacy will continue to follow and monitor vancomycin.    Please contact pharmacy at extension [phone] for questions regarding this assessment.    Thank you for the consult,   Earnestine Kirby       Patient brief summary:  Suyapa Connelly is a 59 y.o. female initiated on antimicrobial therapy with IV Vancomycin for treatment of skin & soft tissue infection    The patient's current regimen is pulse dose    Drug Allergies:   Review of patient's allergies indicates:   Allergen Reactions    Ciprofloxacin Itching    Iodine      Kidney injury    Pcn [penicillins]      Rash; tolerated ceftriaxone on 1/13/20       Actual Body Weight:   68 kg    Renal Function:   Estimated Creatinine Clearance: 13.7 mL/min (A) (based on SCr of 3.3 mg/dL (H)).,     Dialysis Method (if applicable):  intermittent HD    CBC (last 72 hours):  Recent Labs   Lab Result Units 11/02/24  0534 11/02/24  2325 11/03/24  0553 11/03/24  1024 11/03/24  1432 11/04/24  0521   WBC K/uL 10.69 11.70 14.24* 15.75* 24.77* 15.20*   Hemoglobin g/dL 9.4* 6.6* 8.2* 7.3* 7.8* 8.0*   Hematocrit % 29.5* 20.7* 25.0* 21.5* 23.0* 25.8*   Platelets K/uL 318 299 291 278 284 290   Gran % % 79.2* 73.0 80.0* 80.5*  --   --    Lymph % % 6.1* 10.7* 6.3* 6.5*  --   --    Mono % % 10.9 13.3 10.1 10.6  --   --    Eosinophil % % 2.4 1.3 0.4 0.5  --   --    Basophil % % 0.7 0.9 0.6 0.7  --   --     Differential Method  Automated Automated Automated Automated  --   --        Metabolic Panel (last 72 hours):  Recent Labs   Lab Result Units 11/02/24  0534 11/03/24  0553   Sodium mmol/L 130* 133*   Potassium mmol/L 4.1 4.4   Chloride mmol/L 92* 97   CO2 mmol/L 25 25   Glucose mg/dL 101 219*   BUN mg/dL 29* 30*   Creatinine mg/dL 3.6* 3.3*   Albumin g/dL 1.7* 1.6*   Total Bilirubin mg/dL 0.7  --    Alkaline Phosphatase U/L 636*  --    AST U/L 77*  --    ALT U/L 32  --    Magnesium mg/dL 2.1 1.8   Phosphorus mg/dL 3.1 2.9       Vancomycin Administrations:  vancomycin given in the last 96 hours                     vancomycin (VANCOCIN) 500 mg in D5W 100 mL IVPB (MB+) (mg) 500 mg New Bag 10/31/24 1350                    Microbiologic Results:  Microbiology Results (last 7 days)       Procedure Component Value Units Date/Time    Blood culture [[id number]] Collected: 10/29/24 1045    Order Status: Completed Specimen: Blood Updated: 11/03/24 1612     Blood Culture, Routine No growth after 5 days.    Narrative:      Draw sample # 2 from separate site    Blood culture [[id number]] Collected: 10/29/24 1045    Order Status: Completed Specimen: Blood Updated: 11/03/24 1612     Blood Culture, Routine No growth after 5 days.    Narrative:      Draw sample # 2 from separate site    Respiratory Infection Panel (PCR), Nasopharyngeal [[id number]]  (Abnormal) Collected: 10/29/24 1535    Order Status: Completed Specimen: Nasopharyngeal Swab Updated: 10/30/24 0048     Respiratory Infection Panel Source NP Swab     Adenovirus Not Detected     Coronavirus 229E, Common Cold Virus Not Detected     Coronavirus HKU1, Common Cold Virus Not Detected     Coronavirus NL63, Common Cold Virus Not Detected     Coronavirus OC43, Common Cold Virus Not Detected     Comment: The Coronavirus strains detected in this test cause the common cold.  These strains are not the COVID-19 (novel Coronavirus)strain   associated with the respiratory disease  outbreak.          SARS-CoV2 (COVID-19) Qualitative PCR Detected     Human Metapneumovirus Not Detected     Human Rhinovirus/Enterovirus Not Detected     Influenza A (subtypes H1, H1-2009,H3) Not Detected     Influenza B Not Detected     Parainfluenza Virus 1 Not Detected     Parainfluenza Virus 2 Not Detected     Parainfluenza Virus 3 Not Detected     Parainfluenza Virus 4 Not Detected     Respiratory Syncytial Virus Not Detected     Bordetella Parapertussis (AE2962) Not Detected     Bordetella pertussis (ptxP) Not Detected     Chlamydia pneumoniae Not Detected     Mycoplasma pneumoniae Not Detected    Narrative:      Assay not valid for lower respiratory specimens, alternate  testing required.

## 2024-11-04 NOTE — PROGRESS NOTES
11/04/24 1245   Vital Signs   Temp 98.5 °F (36.9 °C)   Temp Source Oral   Pulse 83   Heart Rate Source Monitor   Resp 13   SpO2 99 %   Pulse Oximetry Type Continuous   Device (Oxygen Therapy) room air   BP (!) 140/67   MAP (mmHg) 95   BP Location Right arm   BP Method Automatic   Patient Position Lying        Hemodialysis Catheter left subclavian   No placement date or time found.   Present Prior to Hospital Arrival?: Yes  Location: left subclavian   Line Necessity Review CRRT/HD   Site Assessment No drainage;No swelling;No redness;No warmth   Line Securement Device Antimicrobial Adhesive   Dressing Type CHG impregnated dressing/sponge;Central line dressing;Transparent (Tegaderm)   Dressing Status Clean;Dry;Intact   Dressing Intervention Integrity maintained   Date on Dressing 11/04/24   Dressing Due to be Changed 11/11/24   Venous Patency/Care flushed w/o difficulty;blood return present;intermittent infusion cap changed;normal saline locked;deaccessed   Arterial Patency/Care flushed w/o difficulty;blood return present;intermittent infusion cap changed;normal saline locked;deaccessed   Waveform Not being transduced   Post-Hemodialysis Assessment   Blood Volume Processed (Liters) 80.3 L   Dialyzer Clearance Lightly streaked   Duration of Treatment 180 minutes   Additional Fluid Intake (mL) 500 mL   Total UF (mL) 2500 mL   Net Fluid Removal 2000   Patient Response to Treatment Tx complted, tolerated well, lines flushed and then packed with saline to filling volume with new end caps, no SOB noted   Post-Treatment Weight 66 kg (145 lb 8.1 oz)   Treatment Weight Change -2   Post-Hemodialysis Comments no distress noted

## 2024-11-04 NOTE — PT/OT/SLP PROGRESS
Physical Therapy      Patient Name:  Suyapa Connelly   MRN:  [id number]    Patient not seen today secondary to Nurse/ AVA hold. Patient starting HD. Will follow-up as able.

## 2024-11-04 NOTE — PLAN OF CARE
0845  Updated notes & message sent to [facility] informing that the pt transferred to ICU on 11/3/2024 due to active bleeding at the left stump site.     Pt continues to receive IV zosyn. Order for IV vancomycin noted.        11/04/24 0945   Rounds   Attendance Provider;Nurse    Discharge Plan A [facility]illed Nursing Facility  ([facility])   Why the patient remains in the hospital Requires continued medical care   Transition of Care Barriers Transportation     0945  CM was informed by Dr Starkey that the pt is not medically stable to discharge to [facility] today. Patient was admitted with left thigh cellulitis & continues to be followed by pulm, gen surg, neph, ID, & PT/OT.    1100  Pt very drowsy in the room when CM rounded. No family at the bedside. HD session in progress via left subclavian ramírez. WBC 15.20, H&H 8.2/25.8, 7 alb 1.6 this AM.     1450  CM updated that pt's mother, Tea Connelly ([phone]), via phone regarding the pt's status. Tea verbalized understanding & appreciation.       Will continue to follow.

## 2024-11-04 NOTE — ASSESSMENT & PLAN NOTE
Suyapa Connelly is a 58yo F with b/l AKAs and wound breakdown at L AKA. General Surgery consulted for concern for bleeding at L stump incision.     - No on-going bleeding and would is stable. No significant drainage. - If patient continues to bleed from incision would recommend trying to reapply silver nitrate and holding pressure/continuing pressure wrapping.   -Having dark stools. Could be alternative source of blood loss.   - Monitor CBC  - Continue resuscitation with blood/IV fluids as appropriate  - Rest per primary  -No surgical intervention  - Please call General Surgery with any questions

## 2024-11-04 NOTE — PROGRESS NOTES
[facility] Progress Note    Subjective:      Overnight, no acute events. Bleeding at stump site resolved. Quickly titrated off the norepi drip. This morning, no acute pain. Dialysis ordered for today and pt tolerating. Dark stool noted in bed this am.      Objective:     Last 24 Hour Vital Signs:  BP  Min: 88/50  Max: 146/64  Temp  Av.2 °F (36.8 °C)  Min: 97.5 °F (36.4 °C)  Max: 98.5 °F (36.9 °C)  Pulse  Av.4  Min: 74  Max: 103  Resp  Avg: 15.8  Min: 10  Max: 22  SpO2  Av.5 %  Min: 90 %  Max: 100 %  I/O last 3 completed shifts:  In: 1552.6 [P.O.:120; I.V.:8.5; Blood:509.3; IV Piggyback:914.7]  Out: 0     Physical Examination:  Physical Exam  Vitals and nursing note reviewed.   Constitutional:       General: She is not in acute distress.     Comments: Chronically ill appearing. NAD.    HENT:      Head: Normocephalic and atraumatic.      Mouth/Throat:      Mouth: Mucous membranes are moist.   Eyes:      General:         Right eye: No discharge.         Left eye: No discharge.   Cardiovascular:      Rate and Rhythm: Normal rate and regular rhythm.   Pulmonary:      Effort: Pulmonary effort is normal. No respiratory distress.      Breath sounds: No wheezing.   Abdominal:      General: There is no distension.      Palpations: Abdomen is soft.   Musculoskeletal:      Comments: B/l AKA.    Skin:     General: Skin is warm and dry.           Laboratory:  Trended Lab Data:  Recent Labs     24  0534 24  2325 24  0553 24  1024 24  1432 24  0521 24  0822   WBC 10.69   < > 14.24* 15.75* 24.77* 15.20*  --    HGB 9.4*   < > 8.2* 7.3* 7.8* 8.0*  --    HCT 29.5*   < > 25.0* 21.5* 23.0* 25.8*  --       < > 291 278 284 290  --    *  --  133*  --   --   --  132*   K 4.1  --  4.4  --   --   --  4.8   CL 92*  --  97  --   --   --  97   CO2 25  --  25  --   --   --  18*   BUN 29*  --  30*  --   --   --  48*   CREATININE 3.6*  --  3.3*   --   --   --  4.2*     --  219*  --   --   --  168*   BILITOT 0.7  --   --   --   --   --   --    AST 77*  --   --   --   --   --   --    ALT 32  --   --   --   --   --   --    ALKPHOS 636*  --   --   --   --   --   --    CALCIUM 8.6*  --  7.5*  --   --   --  7.8*   ALBUMIN 1.7*  --  1.6*  --   --   --  1.8*   PROT 7.8  --   --   --   --   --   --    MG 2.1  --  1.8  --   --   --   --    PHOS 3.1  --  2.9  --   --   --  2.8    < > = values in this interval not displayed.       Cardiac:   Recent Labs   Lab 10/29/24  0853 10/29/24  1031   TROPONINI  --  0.033*   BNP 1,478*  --        Urinalysis:   Lab Results   Component Value Date    LABURIN (A) 06/26/2024     ELIZABETH KRUSEI  50,000 - 99,999 cfu/ml  Treatment of asymptomatic candiduria is not recommended (except for   specific populations). Candida isolated in the urine typically   represents colonization. If an indwelling urinary catheter is present  it should be removed or replaced.      COLORU Orange (A) 06/26/2024    COLORU Lucas (A) 06/26/2024    SPECGRAV 1.015 06/26/2024    SPECGRAV 1.015 06/26/2024    NITRITE Negative 06/26/2024    NITRITE Negative 06/26/2024    KETONESU Negative 06/26/2024    KETONESU Negative 06/26/2024    UROBILINOGEN Negative 08/13/2022       Microbiology:  Microbiology Results (last 7 days)       Procedure Component Value Units Date/Time    Blood culture [[id number]] Collected: 10/29/24 1045    Order Status: Completed Specimen: Blood Updated: 11/03/24 1612     Blood Culture, Routine No growth after 5 days.    Narrative:      Draw sample # 2 from separate site    Blood culture [[id number]] Collected: 10/29/24 1045    Order Status: Completed Specimen: Blood Updated: 11/03/24 1612     Blood Culture, Routine No growth after 5 days.    Narrative:      Draw sample # 2 from separate site    Respiratory Infection Panel (PCR), Nasopharyngeal [[id number]]  (Abnormal) Collected: 10/29/24 3242    Order Status: Completed Specimen:  Nasopharyngeal Swab Updated: 10/30/24 0048     Respiratory Infection Panel Source NP Swab     Adenovirus Not Detected     Coronavirus 229E, Common Cold Virus Not Detected     Coronavirus HKU1, Common Cold Virus Not Detected     Coronavirus NL63, Common Cold Virus Not Detected     Coronavirus OC43, Common Cold Virus Not Detected     Comment: The Coronavirus strains detected in this test cause the common cold.  These strains are not the COVID-19 (novel Coronavirus)strain   associated with the respiratory disease outbreak.          SARS-CoV2 (COVID-19) Qualitative PCR Detected     Human Metapneumovirus Not Detected     Human Rhinovirus/Enterovirus Not Detected     Influenza A (subtypes H1, H1-2009,H3) Not Detected     Influenza B Not Detected     Parainfluenza Virus 1 Not Detected     Parainfluenza Virus 2 Not Detected     Parainfluenza Virus 3 Not Detected     Parainfluenza Virus 4 Not Detected     Respiratory Syncytial Virus Not Detected     Bordetella Parapertussis (BD1063) Not Detected     Bordetella pertussis (ptxP) Not Detected     Chlamydia pneumoniae Not Detected     Mycoplasma pneumoniae Not Detected    Narrative:      Assay not valid for lower respiratory specimens, alternate  testing required.            Radiology:  None new     I have personally reviewed the above labs and imaging.    Assessment:     Suyapa Connelly is a 59 y.o.female with h/o HFrEF, PAD s/p b/l AKA, ESRD on HD (TThS), T2DM, HTN, syncope who stepped back up to the ICU for hypotension in the setting of stump bleeding which has now resolved. Continues to require tx for cellulitis vs calciphylaxis of site.      Plan:     Neuro:  No acute issues     Cardiovascular/Hemodynamics:  Hypotension, resolved   - Possibly in setting of blood loss from stump vs other? Although Hb is now stable   - Dark stools noted although pt hemodynamically stable now   - Continue to monitor     Respiratory:   No acute issues   - No oxygen requirement or pulmonary  complications     GI/FEN:  F: None   E: K>4, Mg>2  N: Advance diet     Dark Bowel Movements  - Pt with dark bowel movements noted, home meds include eliquis and plavix, both on hold for now   - Not having significant bowel movements and hemodynamically stable, if pt develops worsening hypotension or hemodynamic instability would add IV protonix BID and consider GI consult     ID:   Cellulitis of L thigh wound   - ID following, previously on Vanc/zosyn, transitioned to zosyn monotherapy and then Vanc restarted with step up to ICU   - No prior culture data   - Agree with abx per ID     COVID 19   - Asx, no indication for treatment     Renal:   ESRD on HD   - Nephrology consulted, continue dialysis  - Dialysis done today   - Possible calciphylaxis noted to wound in setting of dialysis pt   - Renally dose all medication, avoid nephrotoxic agents  - Strict I/Os, daily weights    Heme/Onc:   Concern for hemorrhagic shock, resolved   - Initial concern for significant bleeding from the L wound, now resolved   - Hb stable after 1u pRBC, pt also received Kcentra   - Continue to monitor H/H but pt overall stable at this time   - If pt with dec H/H, would workup for other sources as well  - pt previously on eliquis and plavix, would restart plavix in setting of PVD and wound   - Risk/benefit discussion of eliquis - reportedly for pAfib(?) in setting of bleeding     Endocrine:  T2DM  - Insulin therapy initiated   - SSI + Accuchecks  - Goal glucose 140-180 while in ICU    TSH: 3.3 6 days ago     Feeding: Renal diet   Analgesia: tylenol and oxycodone prn   Sedation: N/a   Thromboembolic ppx: Holding for now   HOB > 30: Yes   Ulcer ppx: N/a  Glucose control: Lantus and SSI   Bowel function: Having bowel movements   Indwelling lines and catheters: HD cath, midline, PIV   De-escalation abx: Vanc/Zosyn, per ID     Dispo: Stable for the floor   CODE: FULL     Negrita Purvis MD  [facility]    11/04/2024 11:10 AM

## 2024-11-04 NOTE — PLAN OF CARE
POC and education discussed with patient, patient did not have any questions, patient does need reinforcement.    Patients VS stable throughout shift. Patient remains on RA and tolerating well.  Patient had HD done today 2.5 liters removed. Patient tolerated well. Patient has had multiple dark BMS on dayshift. Nurse placed Rectal tube per MD order for wounds. Nurse changed Left Leg wound dressing multiple times with vashe, aquacel, ABD pad and medipore tape. For further assessment please see MAR and flowsheets.      Problem: Adult Inpatient Plan of Care  Goal: Plan of Care Review  Outcome: Progressing  Goal: Patient-Specific Goal (Individualized)  Outcome: Progressing  Goal: Absence of Hospital-Acquired Illness or Injury  Outcome: Progressing  Goal: Optimal Comfort and Wellbeing  Outcome: Progressing  Goal: Readiness for Transition of Care  Outcome: Progressing     Problem: Diabetes Comorbidity  Goal: Blood Glucose Level Within Targeted Range  Outcome: Progressing     Problem: Bariatric Environmental Safety  Goal: Safety Maintained with Care  Outcome: Progressing     Problem: Infection  Goal: Absence of Infection Signs and Symptoms  Outcome: Progressing     Problem: Wound  Goal: Optimal Coping  Outcome: Progressing  Goal: Optimal Functional Ability  Outcome: Progressing  Goal: Absence of Infection Signs and Symptoms  Outcome: Progressing  Goal: Improved Oral Intake  Outcome: Progressing  Goal: Optimal Pain Control and Function  Outcome: Progressing  Goal: Skin Health and Integrity  Outcome: Progressing  Goal: Optimal Wound Healing  Outcome: Progressing     Problem: Hemodialysis  Goal: Safe, Effective Therapy Delivery  Outcome: Progressing  Goal: Effective Tissue Perfusion  Outcome: Progressing  Goal: Absence of Infection Signs and Symptoms  Outcome: Progressing     Problem: Fall Injury Risk  Goal: Absence of Fall and Fall-Related Injury  Outcome: Progressing     Problem: Skin Injury Risk Increased  Goal: Skin Health  and Integrity  Outcome: Progressing     Problem: Sepsis/Septic Shock  Goal: Optimal Coping  Outcome: Progressing  Goal: Absence of Bleeding  Outcome: Progressing  Goal: Blood Glucose Level Within Targeted Range  Outcome: Progressing  Goal: Absence of Infection Signs and Symptoms  Outcome: Progressing  Goal: Optimal Nutrition Intake  Outcome: Progressing     Problem: Pain Acute  Goal: Optimal Pain Control and Function  Outcome: Progressing     Problem: Skin or Soft Tissue Infection  Goal: Absence of Infection Signs and Symptoms  Outcome: Progressing     Problem: Chronic Kidney Disease  Goal: Electrolyte Balance  Outcome: Progressing  Goal: Fluid Balance  Outcome: Progressing  Goal: Minimize Renal Failure Effects  Outcome: Progressing     Problem: Comorbidity Management  Goal: Maintenance of Heart Failure Symptom Control  Outcome: Progressing  Goal: Blood Pressure in Desired Range  Outcome: Progressing

## 2024-11-04 NOTE — PROGRESS NOTES
Patient seen and examined on dialysis. Tolerating HD well  Vitals:    11/04/24 0900 11/04/24 0935 11/04/24 1000 11/04/24 1100   BP: (!) 92/45 (!) 106/59 (!) 111/53 123/68   BP Location:       Patient Position:       Pulse: 77 74 76 82   Resp: 10 11 12 10   Temp:  98 °F (36.7 °C)     TempSrc:  Oral     SpO2: 95% 100% 100% 100%   Weight:       Height:           With any question please call  [phone]  ANDREA Barrientos MD    [facility]     RAUL Back MD,   MD ANDREA Melton MD E. V. Harmon, NP I.Goldvarg-Abud, NP    [address]  [place], [id number]

## 2024-11-04 NOTE — PLAN OF CARE
ICU Care Plan  [facility] - Intensive Care    POC reviewed with Suyapa Connelly. Pt verbalized understanding. Questions and concerns addressed. No acute events overnight. Pt progressing toward goals. See below and flowsheets for full assessment and VS info.   Temp:  [98.1 °F (36.7 °C)-98.3 °F (36.8 °C)]   Pulse:  [82-84]   Resp:  [14-20]   BP: (124-143)/(57-65)   SpO2:  [98 %-100 %]     Neuro:  Carlos Coma Scale  Best Eye Response: 4-->(E4) spontaneous  Best Motor Response: 6-->(M6) obeys commands  Best Verbal Response: 4-->(V4) confused  Tyro Coma Scale Score: 14  Assessment Qualifiers: patient not sedated/intubated, no eye obstruction present   Pupil PERRLA: yes  24hr Temp:  [97.5 °F (36.4 °C)-98.5 °F (36.9 °C)]     CV:   Rhythm: normal sinus rhythm     Resp:   Device (Oxygen Therapy): room air    GI/:  Diet/Nutrition Received: consistent carb/diabetic diet  Last Bowel Movement: 11/03/24  Voiding Characteristics: anuria, patient on hemodialysis  I/O this shift:  In: 120 [P.O.:120]  Out: 0     Intake/Output Summary (Last 24 hours) at 11/4/2024 0639  Last data filed at 11/4/2024 0600  Gross per 24 hour   Intake 1322.11 ml   Output 0 ml   Net 1322.11 ml     Gtts/LDAs:   NORepinephrine bitartrate-D5W  0-3 mcg/kg/min Intravenous Continuous   Stopped at 11/03/24 1104     Lines/Drains/Airways       Central Venous Catheter Line  Duration                  Hemodialysis Catheter left subclavian -- days              Peripheral Intravenous Line  Duration                  Peripheral IV - Single Lumen 10/28/24 2230 22 G Anterior;Distal;Right Forearm 6 days         Midline Catheter - Single Lumen 11/03/24 0045 Right median cubital vein (antecubital fossa) other (see comments) 1 day

## 2024-11-04 NOTE — PROGRESS NOTES
Pharmacokinetic Assessment Follow Up: IV Vancomycin    Vancomycin serum concentration assessment(s):    The random level was drawn correctly and can be used to guide therapy at this time. The measurement is within the desired definitive target range of 10 to 15 mcg/mL.    Vancomycin Regimen Plan:    HD scheduled 11/4. Obtain random level prior to scheduled HD.   Re-dose when the random level is less than 20 mcg/mL, next level to be drawn at 0400 with AM labs prior to scheduled HD on 11/4 .   Will follow up HD plan daily.     Drug levels (last 3 results):  Recent Labs   Lab Result Units 11/01/24  0350 11/02/24  0534 11/03/24  0554   Vancomycin, Random ug/mL 25.0 21.8 15.5       Pharmacy will continue to follow and monitor vancomycin.    Please contact pharmacy at extension [phone] for questions regarding this assessment.    Thank you for the consult,   Balbina Hodges       Patient brief summary:  Suyapa Connelly is a 59 y.o. female initiated on antimicrobial therapy with IV Vancomycin for treatment of skin & soft tissue infection    The patient's current regimen is pulse dosing post-HD based on pre-HD level drawn with AM labs on the morning of scheduled HD       Drug Allergies:   Review of patient's allergies indicates:   Allergen Reactions    Ciprofloxacin Itching    Iodine      Kidney injury    Pcn [penicillins]      Rash; tolerated ceftriaxone on 1/13/20       Actual Body Weight:   68 kg    Renal Function:   Estimated Creatinine Clearance: 13.7 mL/min (A) (based on SCr of 3.3 mg/dL (H)).,     Dialysis Method (if applicable):  intermittent HD    CBC (last 72 hours):  Recent Labs   Lab Result Units 11/01/24  0350 11/02/24  0534 11/02/24  2325 11/03/24  0553 11/03/24  1024 11/03/24  1432   WBC K/uL 9.93 10.69 11.70 14.24* 15.75* 24.77*   Hemoglobin g/dL 9.4* 9.4* 6.6* 8.2* 7.3* 7.8*   Hematocrit % 29.3* 29.5* 20.7* 25.0* 21.5* 23.0*   Platelets K/uL 299 318 299 291 278 284   Gran % % 80.2* 79.2* 73.0 80.0* 80.5*  --     Lymph % % 4.7* 6.1* 10.7* 6.3* 6.5*  --    Mono % % 12.8 10.9 13.3 10.1 10.6  --    Eosinophil % % 1.1 2.4 1.3 0.4 0.5  --    Basophil % % 0.5 0.7 0.9 0.6 0.7  --    Differential Method  Automated Automated Automated Automated Automated  --        Metabolic Panel (last 72 hours):  Recent Labs   Lab Result Units 11/01/24  0351 11/02/24  0534 11/03/24  0553   Sodium mmol/L 133* 130* 133*   Potassium mmol/L 3.9 4.1 4.4   Chloride mmol/L 93* 92* 97   CO2 mmol/L 27 25 25   Glucose mg/dL 188* 101 219*   BUN mg/dL 18 29* 30*   Creatinine mg/dL 2.6* 3.6* 3.3*   Albumin g/dL 2.0* 1.7* 1.6*   Total Bilirubin mg/dL 0.8 0.7  --    Alkaline Phosphatase U/L 766* 636*  --    AST U/L 118* 77*  --    ALT U/L 41 32  --    Magnesium mg/dL 1.9 2.1 1.8   Phosphorus mg/dL 2.2* 3.1 2.9       Vancomycin Administrations:  vancomycin given in the last 96 hours                     vancomycin (VANCOCIN) 500 mg in D5W 100 mL IVPB (MB+) (mg) 500 mg New Bag 10/31/24 1350                    Microbiologic Results:  Microbiology Results (last 7 days)       Procedure Component Value Units Date/Time    Blood culture [[id number]] Collected: 10/29/24 1045    Order Status: Completed Specimen: Blood Updated: 11/03/24 1612     Blood Culture, Routine No growth after 5 days.    Narrative:      Draw sample # 2 from separate site    Blood culture [[id number]] Collected: 10/29/24 1045    Order Status: Completed Specimen: Blood Updated: 11/03/24 1612     Blood Culture, Routine No growth after 5 days.    Narrative:      Draw sample # 2 from separate site    Respiratory Infection Panel (PCR), Nasopharyngeal [[id number]]  (Abnormal) Collected: 10/29/24 1535    Order Status: Completed Specimen: Nasopharyngeal Swab Updated: 10/30/24 0048     Respiratory Infection Panel Source NP Swab     Adenovirus Not Detected     Coronavirus 229E, Common Cold Virus Not Detected     Coronavirus HKU1, Common Cold Virus Not Detected     Coronavirus NL63, Common Cold Virus Not  Detected     Coronavirus OC43, Common Cold Virus Not Detected     Comment: The Coronavirus strains detected in this test cause the common cold.  These strains are not the COVID-19 (novel Coronavirus)strain   associated with the respiratory disease outbreak.          SARS-CoV2 (COVID-19) Qualitative PCR Detected     Human Metapneumovirus Not Detected     Human Rhinovirus/Enterovirus Not Detected     Influenza A (subtypes H1, H1-2009,H3) Not Detected     Influenza B Not Detected     Parainfluenza Virus 1 Not Detected     Parainfluenza Virus 2 Not Detected     Parainfluenza Virus 3 Not Detected     Parainfluenza Virus 4 Not Detected     Respiratory Syncytial Virus Not Detected     Bordetella Parapertussis (AF5255) Not Detected     Bordetella pertussis (ptxP) Not Detected     Chlamydia pneumoniae Not Detected     Mycoplasma pneumoniae Not Detected    Narrative:      Assay not valid for lower respiratory specimens, alternate  testing required.

## 2024-11-04 NOTE — SUBJECTIVE & OBJECTIVE
Interval History: Doing well this am off pressors. No on going bleeding at stump. Hbg stable. Would itself largely unchanged.     Medications:  Continuous Infusions:  Scheduled Meds:   atorvastatin  80 mg Oral Daily    [START ON 11/8/2024] cinacalcet  30 mg Oral Every Mon, Wed, Fri    insulin glargine U-100  10 Units Subcutaneous QHS    piperacillin-tazobactam (Zosyn) IV (PEDS and ADULTS) (extended infusion is not appropriate)  4.5 g Intravenous Q12H    pregabalin  50 mg Oral Daily    sertraline  100 mg Oral Daily    vancomycin (VANCOCIN) IV (PEDS and ADULTS)  500 mg Intravenous Once     PRN Meds:  Current Facility-Administered Medications:     0.9% NaCl, , Intravenous, PRN    acetaminophen, 650 mg, Oral, Q4H PRN    dextrose 10%, 12.5 g, Intravenous, PRN    dextrose 10%, 25 g, Intravenous, PRN    glucagon (human recombinant), 1 mg, Intramuscular, PRN    glucose, 16 g, Oral, PRN    glucose, 24 g, Oral, PRN    heparin (porcine), 3,600 Units, Intravenous, PRN    insulin aspart U-100, 0-10 Units, Subcutaneous, QID (AC + HS) PRN    melatonin, 6 mg, Oral, Nightly PRN    naloxone, 0.02 mg, Intravenous, PRN    ondansetron, 4 mg, Intravenous, Q8H PRN    oxyCODONE, 10 mg, Oral, Q12H PRN    polyethylene glycol, 17 g, Oral, PRN    senna-docusate 8.6-50 mg, 1 tablet, Oral, BID PRN    sodium chloride 0.9%, 250 mL, Intravenous, PRN    sodium chloride 0.9%, 10 mL, Intravenous, Q8H PRN    Pharmacy to dose Vancomycin consult, , , Once **AND** vancomycin - pharmacy to dose, , Intravenous, pharmacy to manage frequency     Review of patient's allergies indicates:   Allergen Reactions    Ciprofloxacin Itching    Iodine      Kidney injury    Pcn [penicillins]      Rash; tolerated ceftriaxone on 1/13/20     Objective:     Vital Signs (Most Recent):  Temp: 98 °F (36.7 °C) (11/04/24 0935)  Pulse: 82 (11/04/24 1100)  Resp: 10 (11/04/24 1100)  BP: 123/68 (11/04/24 1100)  SpO2: 100 % (11/04/24 1100) Vital Signs (24h Range):  Temp:  [97.5 °F  (36.4 °C)-98.5 °F (36.9 °C)] 98 °F (36.7 °C)  Pulse:  [] 82  Resp:  [10-22] 10  SpO2:  [90 %-100 %] 100 %  BP: ()/(45-74) 123/68     Weight: 68 kg (150 lb)  Body mass index is 45.77 kg/m².    Intake/Output - Last 3 Shifts         11/02 0700  11/03 0659 11/03 0700 11/04 0659 11/04 0700  11/05 0659    P.O. 125 120     I.V. (mL/kg)  8.5 (0.1)     Blood 133.1 376.3     Other 550      IV Piggyback  914.7     Total Intake(mL/kg) 808.1 (11.9) 1419.5 (20.9)     Urine (mL/kg/hr)  0 (0)     Other 2550      Stool  0     Total Output 2550 0     Net -1741.9 +1419.5            Urine Occurrence 0 x 0 x     Stool Occurrence 1 x 2 x              Physical Exam  Constitutional:       General: She is not in acute distress.     Appearance: She is ill-appearing.   HENT:      Head: Normocephalic and atraumatic.   Pulmonary:      Effort: Pulmonary effort is normal. No respiratory distress.   Musculoskeletal:      Comments: B/l AKA   Skin:     Comments: Patient's L stump incision is broken down with some necrotic appearing skin. 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    Significant Diagnostics:  I have reviewed all pertinent imaging results/findings within the past 24 hours.

## 2024-11-04 NOTE — PROGRESS NOTES
[facility] - Intensive Care  General Surgery  Progress Note    Subjective:     History of Present Illness:  Suyapa Connelly is known to our service. She is a 58yo F with pmhx of hypertension, hyperlipidemia, diabetes mellitus type 2, ESRD on HD, and PAD (s/p B/l AKA) who presented to the ED after being found on the floor by her neighbor. Patient reports that she got out of bed to change her pad and diaper but was unable to get back into bed and stayed on the floor throughout the evening. Per nursing, patient was altered and minimally responsive on admission to ICU but has become more alert since starting HD/ being put on bipap. Patient was noted to have some wound breakdown at the medial aspect of her L AKA incision and CT was obtained that showed subcutaneous fatty stranding of the left thigh and distal medial thigh subcutaneous emphysema. WBC and lactate wnl. General Surgery was consulted for further evaluation d/t concerns for nec fasc. On exam patient is more alert and answering questions appropriately. Wound breakdown at medial aspect of L AKA incision with some surrounding induration. No significant erythema, fluctuance or drainage noted. Patient does have pain on palpation of that area. Patient was determined not to have nec fasc based on exam and labs. No indications for wound debridement and recommended continuing with current wound care regimen. She had been progressing well and was stepped down to the floor. Overnight on 11/3/24 she was noted to have significant bleeding from L stump with saturation of bedding. Surgicel was applied by primary team and CBC drawn that showed a hgb of 6.6 from 9.4. Blood products were infused and she was stepped up to the ICU for further monitoring. General Surgery was consulted for evaluation. Hgb came up to 8.2 with transfusion of 1u pRBC. This am she has reportedly continued to saturate her dressing even with pressure applied and had to be started on levo to maintain her  pressures.     Post-Op Info:  * No surgery found *         Interval History: Doing well this am off pressors. No on going bleeding at stump. Hbg stable. Would itself largely unchanged.     Medications:  Continuous Infusions:  Scheduled Meds:   atorvastatin  80 mg Oral Daily    [START ON 11/8/2024] cinacalcet  30 mg Oral Every Mon, Wed, Fri    insulin glargine U-100  10 Units Subcutaneous QHS    piperacillin-tazobactam (Zosyn) IV (PEDS and ADULTS) (extended infusion is not appropriate)  4.5 g Intravenous Q12H    pregabalin  50 mg Oral Daily    sertraline  100 mg Oral Daily    vancomycin (VANCOCIN) IV (PEDS and ADULTS)  500 mg Intravenous Once     PRN Meds:  Current Facility-Administered Medications:     0.9% NaCl, , Intravenous, PRN    acetaminophen, 650 mg, Oral, Q4H PRN    dextrose 10%, 12.5 g, Intravenous, PRN    dextrose 10%, 25 g, Intravenous, PRN    glucagon (human recombinant), 1 mg, Intramuscular, PRN    glucose, 16 g, Oral, PRN    glucose, 24 g, Oral, PRN    heparin (porcine), 3,600 Units, Intravenous, PRN    insulin aspart U-100, 0-10 Units, Subcutaneous, QID (AC + HS) PRN    melatonin, 6 mg, Oral, Nightly PRN    naloxone, 0.02 mg, Intravenous, PRN    ondansetron, 4 mg, Intravenous, Q8H PRN    oxyCODONE, 10 mg, Oral, Q12H PRN    polyethylene glycol, 17 g, Oral, PRN    senna-docusate 8.6-50 mg, 1 tablet, Oral, BID PRN    sodium chloride 0.9%, 250 mL, Intravenous, PRN    sodium chloride 0.9%, 10 mL, Intravenous, Q8H PRN    Pharmacy to dose Vancomycin consult, , , Once **AND** vancomycin - pharmacy to dose, , Intravenous, pharmacy to manage frequency     Review of patient's allergies indicates:   Allergen Reactions    Ciprofloxacin Itching    Iodine      Kidney injury    Pcn [penicillins]      Rash; tolerated ceftriaxone on 1/13/20     Objective:     Vital Signs (Most Recent):  Temp: 98 °F (36.7 °C) (11/04/24 0935)  Pulse: 82 (11/04/24 1100)  Resp: 10 (11/04/24 1100)  BP: 123/68 (11/04/24 1100)  SpO2: 100 %  (11/04/24 1100) Vital Signs (24h Range):  Temp:  [97.5 °F (36.4 °C)-98.5 °F (36.9 °C)] 98 °F (36.7 °C)  Pulse:  [] 82  Resp:  [10-22] 10  SpO2:  [90 %-100 %] 100 %  BP: ()/(45-74) 123/68     Weight: 68 kg (150 lb)  Body mass index is 45.77 kg/m².    Intake/Output - Last 3 Shifts         11/02 0700 11/03 0659 11/03 0700 11/04 0659 11/04 0700 11/05 0659    P.O. 125 120     I.V. (mL/kg)  8.5 (0.1)     Blood 133.1 376.3     Other 550      IV Piggyback  914.7     Total Intake(mL/kg) 808.1 (11.9) 1419.5 (20.9)     Urine (mL/kg/hr)  0 (0)     Other 2550      Stool  0     Total Output 2550 0     Net -1741.9 +1419.5            Urine Occurrence 0 x 0 x     Stool Occurrence 1 x 2 x              Physical Exam  Constitutional:       General: She is not in acute distress.     Appearance: She is ill-appearing.   HENT:      Head: Normocephalic and atraumatic.   Pulmonary:      Effort: Pulmonary effort is normal. No respiratory distress.   Musculoskeletal:      Comments: B/l AKA   Skin:     Comments: Patient's L stump incision is broken down with some necrotic appearing skin. 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    Significant Diagnostics:  I have reviewed all pertinent imaging results/findings within the past 24 hours.  Assessment/Plan:     S/P AKA (above knee amputation) bilateral  Suyapa Connelly is a 60yo F with b/l AKAs and wound breakdown at L AKA. General Surgery consulted for concern for bleeding at L stump incision.     - No on-going bleeding and would is stable. No significant drainage. - If patient continues to bleed from incision would recommend trying to reapply silver nitrate and holding pressure/continuing pressure wrapping.   -Having dark stools. Could be alternative source of blood loss.   - Monitor  CBC  - Continue resuscitation with blood/IV fluids as appropriate  - Rest per primary  -No surgical intervention  - Please call General Surgery with any questions        Godwin Ramires MD  General Surgery  [facility] - Intensive Care

## 2024-11-04 NOTE — PT/OT/SLP PROGRESS
Occupational Therapy      Patient Name:  Suyapa Connelly   MRN:  [id number]    Patient not seen today secondary to Nurse/ AVA hold, Dialysis. Will follow-up as able.    11/4/2024

## 2024-11-05 LAB
ALBUMIN SERPL BCP-MCNC: 1.9 G/DL (ref 3.5–5.2)
ANION GAP SERPL CALC-SCNC: 13 MMOL/L (ref 8–16)
BUN SERPL-MCNC: 20 MG/DL (ref 6–20)
CALCIUM SERPL-MCNC: 8.5 MG/DL (ref 8.7–10.5)
CHLORIDE SERPL-SCNC: 108 MMOL/L (ref 95–110)
CO2 SERPL-SCNC: 19 MMOL/L (ref 23–29)
CREAT SERPL-MCNC: 2.8 MG/DL (ref 0.5–1.4)
ERYTHROCYTE [DISTWIDTH] IN BLOOD BY AUTOMATED COUNT: 19.8 % (ref 11.5–14.5)
EST. GFR  (NO RACE VARIABLE): 19 ML/MIN/1.73 M^2
GLUCOSE SERPL-MCNC: 125 MG/DL (ref 70–110)
HCT VFR BLD AUTO: 24.4 % (ref 37–48.5)
HGB BLD-MCNC: 7.8 G/DL (ref 12–16)
MCH RBC QN AUTO: 31.3 PG (ref 27–31)
MCHC RBC AUTO-ENTMCNC: 32 G/DL (ref 32–36)
MCV RBC AUTO: 98 FL (ref 82–98)
PHOSPHATE SERPL-MCNC: 2.9 MG/DL (ref 2.7–4.5)
PLATELET # BLD AUTO: 343 K/UL (ref 150–450)
PMV BLD AUTO: 11.1 FL (ref 9.2–12.9)
POCT GLUCOSE: 138 MG/DL (ref 70–110)
POCT GLUCOSE: 237 MG/DL (ref 70–110)
POCT GLUCOSE: 288 MG/DL (ref 70–110)
POCT GLUCOSE: 326 MG/DL (ref 70–110)
POTASSIUM SERPL-SCNC: 4.5 MMOL/L (ref 3.5–5.1)
RBC # BLD AUTO: 2.49 M/UL (ref 4–5.4)
SODIUM SERPL-SCNC: 140 MMOL/L (ref 136–145)
VANCOMYCIN SERPL-MCNC: 18.4 UG/ML
WBC # BLD AUTO: 15.53 K/UL (ref 3.9–12.7)

## 2024-11-05 PROCEDURE — 99900035 HC TECH TIME PER 15 MIN (STAT): Mod: HCNC

## 2024-11-05 PROCEDURE — 25000003 PHARM REV CODE 250: Mod: HCNC | Performed by: STUDENT IN AN ORGANIZED HEALTH CARE EDUCATION/TRAINING PROGRAM

## 2024-11-05 PROCEDURE — 97530 THERAPEUTIC ACTIVITIES: CPT | Mod: HCNC,CO

## 2024-11-05 PROCEDURE — 94761 N-INVAS EAR/PLS OXIMETRY MLT: CPT | Mod: HCNC

## 2024-11-05 PROCEDURE — 25000003 PHARM REV CODE 250: Mod: HCNC | Performed by: INTERNAL MEDICINE

## 2024-11-05 PROCEDURE — 63600175 PHARM REV CODE 636 W HCPCS: Mod: HCNC | Performed by: INTERNAL MEDICINE

## 2024-11-05 PROCEDURE — 80202 ASSAY OF VANCOMYCIN: CPT | Mod: HCNC | Performed by: FAMILY MEDICINE

## 2024-11-05 PROCEDURE — 11000001 HC ACUTE MED/SURG PRIVATE ROOM: Mod: HCNC

## 2024-11-05 PROCEDURE — 97530 THERAPEUTIC ACTIVITIES: CPT | Mod: HCNC

## 2024-11-05 PROCEDURE — 85027 COMPLETE CBC AUTOMATED: CPT | Mod: HCNC | Performed by: FAMILY MEDICINE

## 2024-11-05 PROCEDURE — 25000003 PHARM REV CODE 250: Mod: HCNC

## 2024-11-05 PROCEDURE — 27000207 HC ISOLATION: Mod: HCNC

## 2024-11-05 PROCEDURE — 80069 RENAL FUNCTION PANEL: CPT | Mod: HCNC | Performed by: STUDENT IN AN ORGANIZED HEALTH CARE EDUCATION/TRAINING PROGRAM

## 2024-11-05 RX ORDER — SODIUM BICARBONATE 650 MG/1
650 TABLET ORAL 3 TIMES DAILY
Status: DISCONTINUED | OUTPATIENT
Start: 2024-11-05 | End: 2024-11-12 | Stop reason: HOSPADM

## 2024-11-05 RX ORDER — INSULIN GLARGINE 100 [IU]/ML
5 INJECTION, SOLUTION SUBCUTANEOUS NIGHTLY
Status: DISCONTINUED | OUTPATIENT
Start: 2024-11-05 | End: 2024-11-12 | Stop reason: HOSPADM

## 2024-11-05 RX ORDER — CALCITRIOL 0.25 UG/1
0.5 CAPSULE ORAL DAILY
Status: DISCONTINUED | OUTPATIENT
Start: 2024-11-05 | End: 2024-11-12 | Stop reason: HOSPADM

## 2024-11-05 RX ADMIN — PREGABALIN 50 MG: 50 CAPSULE ORAL at 08:11

## 2024-11-05 RX ADMIN — OXYCODONE HYDROCHLORIDE 10 MG: 10 TABLET, FILM COATED, EXTENDED RELEASE ORAL at 08:11

## 2024-11-05 RX ADMIN — ACETAMINOPHEN 650 MG: 325 TABLET ORAL at 02:11

## 2024-11-05 RX ADMIN — INSULIN ASPART 5 UNITS: 100 INJECTION, SOLUTION INTRAVENOUS; SUBCUTANEOUS at 10:11

## 2024-11-05 RX ADMIN — SODIUM BICARBONATE 650 MG TABLET 650 MG: at 10:11

## 2024-11-05 RX ADMIN — CALCITRIOL CAPSULES 0.25 MCG 0.5 MCG: 0.25 CAPSULE ORAL at 01:11

## 2024-11-05 RX ADMIN — INSULIN ASPART 4 UNITS: 100 INJECTION, SOLUTION INTRAVENOUS; SUBCUTANEOUS at 11:11

## 2024-11-05 RX ADMIN — INSULIN GLARGINE 5 UNITS: 100 INJECTION, SOLUTION SUBCUTANEOUS at 10:11

## 2024-11-05 RX ADMIN — Medication 1 CAPSULE: at 01:11

## 2024-11-05 RX ADMIN — INSULIN ASPART 8 UNITS: 100 INJECTION, SOLUTION INTRAVENOUS; SUBCUTANEOUS at 04:11

## 2024-11-05 RX ADMIN — PIPERACILLIN SODIUM AND TAZOBACTAM SODIUM 4.5 G: 4; .5 INJECTION, POWDER, LYOPHILIZED, FOR SOLUTION INTRAVENOUS at 03:11

## 2024-11-05 RX ADMIN — PIPERACILLIN SODIUM AND TAZOBACTAM SODIUM 4.5 G: 4; .5 INJECTION, POWDER, LYOPHILIZED, FOR SOLUTION INTRAVENOUS at 04:11

## 2024-11-05 RX ADMIN — SODIUM BICARBONATE 650 MG TABLET 650 MG: at 02:11

## 2024-11-05 RX ADMIN — ATORVASTATIN CALCIUM 80 MG: 40 TABLET, FILM COATED ORAL at 08:11

## 2024-11-05 RX ADMIN — SERTRALINE HYDROCHLORIDE 100 MG: 100 TABLET ORAL at 08:11

## 2024-11-05 NOTE — NURSING
Patient arrived to room 404 via bed with nurse.  Patient is awake, alert, VSS, and has no complaints at this time.  Patient now resting comfortably in bed locked in lowest position, side rails upx3, bed alarm on, and call bell in reach.  Will continue to monitor.

## 2024-11-05 NOTE — PT/OT/SLP PROGRESS
Occupational Therapy   Treatment    Name: Suyapa Connelly  MRN: [id number]  Admitting Diagnosis:  Cellulitis of left thigh       Recommendations:     Discharge Recommendations: Moderate Intensity Therapy  Discharge Equipment Recommendations:  to be determined by next level of care, lift device, hospital bed  Barriers to discharge:  Inaccessible home environment    Patient would benefit from jany lift device as pt is completely immobile or has limited mobility and unable to safely transfer from one surface to another such as bed to wheelchair without significant physical assist. Use of jany lift device can significantly reduce the risk of patient/caregiver injury and/or falls.    This patient requires a hospital bed due to:   Required positioning of the body in ways not feasible with an ordinary bed to alleviate pain / is completely immobile / or has limited mobility and cannot independently change their position without the use of the bed. The positioning of their body cannot be sufficiently resolved with the use of pillows and wedges    Assessment:     Suyapa Connelly is a 59 y.o. female with a medical diagnosis of Cellulitis of left thigh.  Performance deficits affecting function are weakness, impaired endurance, impaired self care skills, impaired functional mobility, gait instability, impaired balance, decreased coordination, impaired sensation, decreased lower extremity function, decreased safety awareness, pain, impaired joint extensibility, decreased ROM, impaired skin.     Rehab Prognosis:  Poor; patient would benefit from acute skilled OT services to address these deficits and reach maximum level of function.       Plan:     Patient to be seen 3 x/week to address the above listed problems via self-care/home management, therapeutic activities, therapeutic exercises  Plan of Care Expires:    Plan of Care Reviewed with: patient    Subjective     Chief Complaint: pain  Patient/Family Comments/goals: return  to PLOF  Pain/Comfort:  Pain Rating 1: 10/10 (based on sellers-baker faces scale)  Location - Side 1: Bilateral  Location - Orientation 1: lower  Location 1: leg  Pain Addressed 1: Reposition, Distraction, Cessation of Activity  Pain Rating Post-Intervention 1:  (not rated, but tearful)    Objective:     Communicated with: nurseVerenice prior to session.  Patient found HOB elevated with bowel management system (full ICU monitoring) upon OT entry to room.    General Precautions: Standard, airborne, droplet, fall, contact    Orthopedic Precautions:N/A  Braces: N/A  Respiratory Status: Room air    Bed Mobility:    Patient completed Scooting/Bridging with dependent and 2 persons   Refused turning or any other bed mobility    Functional Mobility/Transfers:  Not safe at this time    Activities of Daily Living:  Feeding:  stand by assistance    Toileting: rectal tube      AMPA 6 Click ADL: 11    Treatment & Education:  Educated on purpose/role of OT  Desensitization massage to R residual limb completed by PT  Patient with limited tolerance 2/2 pain  Also reporting phantom limb pain in L knee  Declined rolling as means of exercise  Argumentative re: therapy importance; perseverating on getting a coke to drink  depA to HOB via drawsheet and HOB elevated for self-feeding    Patient left HOB elevated with all lines intact, call button in reach, and nsg present    GOALS:   Multidisciplinary Problems       Occupational Therapy Goals          Problem: Occupational Therapy    Goal Priority Disciplines Outcome Interventions   Occupational Therapy Goal     OT, PT/OT Progressing    Description: Goals to be met by: 12/1/2024     Patient will increase functional independence with ADLs by performing:    UE Dressing with Set-up Assistance.  LE Dressing with Minimal Assistance.  Grooming while EOB with Set-up Assistance.  Sitting at edge of bed x10 minutes with Supervision.  Rolling to Bilateral with Modified [place].   Supine to sit  with Minimal Assistance.                         Time Tracking:     OT Date of Treatment: 11/05/24  OT Start Time: 1130  OT Stop Time: 1156  OT Total Time (min): 26 min Overlap with PT for portions of session due to complex nature of patient and for safety with mobility to decrease fall risk for patient and caregiver injury requiring two skilled therapists to provide different interventions.    Billable Minutes:Therapeutic Activity 26          Number of MACKENZEI visits since last OT visit: 0    11/5/2024   81

## 2024-11-05 NOTE — PLAN OF CARE
"   11/05/24 1040   Rounds   Attendance Provider;Nurse    Discharge Plan A Skilled Nursing Facility  ([facility])   Why the patient remains in the hospital Requires continued medical care   Transition of Care Barriers Transportation       1040  Patient tearful in bed when CM participated in SIBR with Dr Parra. No family present. Pt upset regarding FMS placed this AM. MD & CM provided education and support. Pt requested to speak with her mother. Voicemail message left by this CM for her mother, Tea Connelly ([phone]), requesting that she call the ICU.     Patient was admitted with left thigh cellulitis, continues to be followed by pulm, gen surg, neph, ID, and PT/OT. Pt transferred to ICU on 11/3/2024 due to active bleeding to her left stump site. Per gen surg note, "Silver nitrate applied to small area of oozing at medial aspect of incision with good hemostasis. No other bleeding noted. Pressure dressing applied with gauze and ace wrap".    1345  Updated notes sent to [facility] via [email or web address]. CM attempted to contact the SAW at [facility] to inform that the pt will need to have her HD chair moved to a [facility] location closer to [place] however SW was in a meeting. CM to call again.     4258  Message left for the SW at [facility] ([phone]), informing that the pt's HD chair will need to be changed to a [facility] closer to [place]. Awaiting response.     1420  CM received a return call from EDUARD Burton w/[facility]. CM informed Josephine that per Mariann ([phone]) w/[facility] the pt's HD chair will need to be changed to a [facility] center in the [place] area. Josephine stated she would submit the request & update this CM.    9065  CM informed the pt's mother, Tea Connelly ([phone]), via phone that the pt stepdown from ICU today & will need someone to sign admission paperwork at [facility] when she is medically stable to discharge. Tea stated that she is " not feeling well & that the pt's cousin, Jen Arias ([phone]), will sign the paperwork. Voicemail message left for Jen informing of above. Awaiting response.     1600  CM was informed by Jen that she will not sign admission paperwork for the pt but will provide transportation for the pt's mother to complete paperwork.     1700  Message sent to [facility] via [facility] informing of above.       Will continue to follow.

## 2024-11-05 NOTE — PT/OT/SLP PROGRESS
Physical Therapy Treatment    Patient Name:  Suyapa Connelly   MRN:  [id number]    Recommendations:     Discharge Recommendations: Moderate Intensity Therapy  Discharge Equipment Recommendations: to be determined by next level of care, lift device, hospital bed  Barriers to discharge: Decreased caregiver support and impaired function, inaccessible home environment     Assessment:     Suyapa Connelly is a 59 y.o. female admitted with a medical diagnosis of Cellulitis of left thigh.  She presents with the following impairments/functional limitations: impaired balance, weakness, decreased safety awareness, impaired skin, pain, edema, impaired endurance, impaired self care skills, impaired functional mobility, gait instability, impaired sensation, decreased lower extremity function, decreased upper extremity function, decreased ROM, impaired cognition . Pt does not tolerate activity / therapy well due to pain.     Patient would benefit from jany lift device as pt is completely immobile or has limited mobility and unable to safely transfer from one surface to another such as bed to wheelchair without significant physical assist. Use of jany lift device can significantly reduce the risk of patient/caregiver injury and/or falls.    This patient requires a hospital bed due to:     Required positioning of the body in ways not feasible with an ordinary bed to alleviate pain and has limited mobility and cannot independently change their position without the use of the bed. The positioning of their body cannot be sufficiently resolved with the use of pillows and wedges        Rehab Prognosis: Poor; patient would benefit from acute skilled PT services to address these deficits and reach maximum level of function.    Recent Surgery: * No surgery found *      Plan:     During this hospitalization, patient to be seen 3 x/week to address the identified rehab impairments via therapeutic activities, therapeutic exercises,  neuromuscular re-education, wheelchair management/training and progress toward the following goals:    Plan of Care Expires:  12/01/24    Subjective     Chief Complaint: pain  Patient/Family Comments/goals: to have a coke  Pain/Comfort:  Pain Rating 1: 10/10 (based on sellers-baker faces scale)  Location - Side 1: Bilateral  Location - Orientation 1: lower  Location 1: leg  Pain Addressed 1: Reposition, Distraction, Cessation of Activity, Nurse notified  Pain Rating Post-Intervention 1:  (not rated, but tearful)      Objective:     Communicated with nsg prior to session.  Patient found HOB elevated with bowel management system, peripheral IV, telemetry, blood pressure cuff, pulse ox (continuous) (full ICU monitoring) upon PT entry to room.     General Precautions: Standard, fall, droplet, contact, airborne  Orthopedic Precautions: N/A  Braces: N/A  Respiratory Status: Room air     Functional Mobility:  Bed Mobility:     Scooting: dependence and of 2 persons      AM-PAC 6 CLICK MOBILITY  Turning over in bed (including adjusting bedclothes, sheets and blankets)?: 1  Sitting down on and standing up from a chair with arms (e.g., wheelchair, bedside commode, etc.): 1  Moving from lying on back to sitting on the side of the bed?: 1  Moving to and from a bed to a chair (including a wheelchair)?: 1  Need to walk in hospital room?: 1  Climbing 3-5 steps with a railing?: 1  Basic Mobility Total Score: 6       Treatment & Education:  Pt refused turning and sitting EOB due to pain  Attempted desensitization massage to R residual limb with poor tolerance due to pain, pt tearful so further attempts deferred  Discussed use of mirror therapy to address phantom pain in L knee and pt reports she is willing  Pt was argumentative throughout session and perseverating on getting a coke to drink   Educated pt on importance of upright sitting to improve lung function and pt continuing to argue and request a coke - nsg aware  Scooted pt to HOB  via draw sheet and DepA X 2    Patient left HOB elevated with all lines intact, call button in reach, nsg notified, and nsg present..    GOALS:   Multidisciplinary Problems       Physical Therapy Goals          Problem: Physical Therapy    Goal Priority Disciplines Outcome Interventions   Physical Therapy Goal     PT, PT/OT Progressing    Description: Goals to be met by: 24     Patient will increase functional independence with mobility by performin. Supine to sit with Moderate Assistance  2. Sit to supine with Moderate Assistance  3. Rolling to Left and Right with Minimal Assistance.  4. Bed to chair transfer with Moderate Assistance using Slideboard  5. Wheelchair propulsion x 25 feet with Minimal Assistance using bilateral upper extremities                         Time Tracking:     PT Received On: 24  PT Start Time: 1129     PT Stop Time: 1156  PT Total Time (min): 27 min Overlap with BAZZI for portions of session due to complex nature of patient and for safety with mobility to decrease fall risk for patient and caregiver injury requiring two skilled therapists to provide different interventions.      Billable Minutes: Therapeutic Activity 27    Treatment Type: Treatment  PT/PTA: PT     Number of PTA visits since last PT visit: 0     2024

## 2024-11-05 NOTE — PROGRESS NOTES
[facility] Medicine  Progress Note    Patient Name: Suyapa Connelly  MRN: [id number]  Patient Class: IP- Inpatient   Admission Date: 10/28/2024  Length of Stay: 8 days  Attending Physician: Chivo Parra MD  Primary Care Provider: Vanessa Noel MD        Subjective:     Principal Problem:Cellulitis of left thigh        HPI:  Patient is a 59-year-old female with a known past medical history of hypertension, hyperlipidemia, diabetes mellitus type 2, ESRD on HD, and deconditioning that presents the ER after being on floor throughout the evening.     Patient presents after being found on the floor by a neighbor today.  Patient reports that she got out of bed to change her pad and diaper but was unable to get back into bed, staying on the floor throughout the evening.  Patient's  who normally cares for her, was arrested yesterday and is in half-way and patient was unable to care for herself.  When patient was found by neighbor on floor, EMS was called and patient was brought to the ER for further evaluation.  On arrival to ER, patient in no acute distress.  Labs essentially within normal limits.  Patient last dialyzed on Thursday and no signs of fluid overload or electrolyte abnormalities.  Patient has no family NS able to assist her at home other than her  who is currently incarcerated.  Patient admitted for social problems and hemodialysis.    Patient due to her open wounds and CT finding of her left thigh - emphysema can not rule out infection -     Overview/Hospital Course:  No notes on file    Interval History:     Patient was very tearful abiut having the rectal tube  I reassured her and explained the reason why she needs it and it would be better than having a skin break , she calmed down.  was at bedside and tried calling her mother but she didn't     Patient has dark green watery stool, no foul smelling.        Review of Systems   Constitutional:  Negative.    HENT: Negative.     Respiratory: Negative.     Cardiovascular: Negative.    Gastrointestinal: Negative.    Genitourinary: Negative.    Musculoskeletal:  Positive for myalgias.   Skin: Negative.    Neurological: Negative.    Psychiatric/Behavioral:  Positive for dysphoric mood.      Objective:     Vital Signs (Most Recent):  Temp: 98.5 °F (36.9 °C) (11/04/24 1245)  Pulse: 83 (11/04/24 1300)  Resp: 10 (11/04/24 1300)  BP: (!) 122/58 (11/04/24 1300)  SpO2: 96 % (11/04/24 1300) Vital Signs (24h Range):  Temp:  [98 °F (36.7 °C)-98.5 °F (36.9 °C)] 98.5 °F (36.9 °C)  Pulse:  [74-87] 83  Resp:  [10-20] 10  SpO2:  [95 %-100 %] 96 %  BP: ()/(45-74) 122/58     Weight: 68 kg (150 lb)  Body mass index is 45.77 kg/m².    Intake/Output Summary (Last 24 hours) at 11/4/2024 1448  Last data filed at 11/4/2024 1245  Gross per 24 hour   Intake 765.23 ml   Output 2500 ml   Net -1734.77 ml         Physical Exam  Constitutional:       Appearance: She is ill-appearing.   HENT:      Head: Normocephalic and atraumatic.      Mouth/Throat:      Mouth: Mucous membranes are moist.   Cardiovascular:      Rate and Rhythm: Normal rate and regular rhythm.   Pulmonary:      Effort: Pulmonary effort is normal. No respiratory distress.      Breath sounds: Normal breath sounds.   Abdominal:      General: Abdomen is flat.      Palpations: Abdomen is soft.   Musculoskeletal:      Comments: Bilateral AKA  Left AKA stump covered with clean dressing   Skin:     General: Skin is warm and dry.   Neurological:      General: No focal deficit present.      Mental Status: She is alert and oriented to person, place, and time.             Significant Labs: All pertinent labs within the past 24 hours have been reviewed.  CBC:   Recent Labs   Lab 11/03/24  1024 11/03/24  1432 11/04/24  0521   WBC 15.75* 24.77* 15.20*   HGB 7.3* 7.8* 8.0*   HCT 21.5* 23.0* 25.8*    284 290     CMP:   Recent Labs   Lab 11/03/24  0553 11/04/24  0822   *  132*   K 4.4 4.8   CL 97 97   CO2 25 18*   * 168*   BUN 30* 48*   CREATININE 3.3* 4.2*   CALCIUM 7.5* 7.8*   ALBUMIN 1.6* 1.8*   ANIONGAP 11 17*       Significant Imaging:  no new    Assessment/Plan:      * Cellulitis of left thigh  L AKA- necrotic amputation surgical site- developing eschar 9x15x0.2cm and blisters to periwound  Continue IV antibiotics   General surgery consulted for wound debridement. Recs: continue recommendations per Wound Care   - Aquacel AG daily to L AKA    ID recs:   Continue vancomycin/ piperacillin tazobactam  Given one dose of metronidazole.  Difficult to determine length of therapy at this time but related to treatment response. Unfortunately, the aspect of the ischemic lesions complicate assessment of recovery.  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  Patient is status post CABG as well as stents that have clotted in her lower extremities    Pain control-- continue current regimen as BP allows  Cautious with narcs secondary to bradypnea/ decreased respiratory rate    Pressure injury of sacral region, unstageable  Wound care    - Pressure injury prevention interventions   - Triad ointment BID to sacrum/buttocks      Morbid obesity with BMI of 50.0-59.9, adult  Body mass index is 45.77 kg/m². Morbid obesity complicates all aspects of disease management from diagnostic modalities to treatment.           S/P AKA (above knee amputation) bilateral  Fall precautions     Septic shock  This patient had shock. The type of shock is distributive due to sepsis. She was admitted to the intensive care unit 10/29.   Patient did not require IVF resuscitation or pressors. BP recovered.  Stepped down 10/31.   Continue IV abx. For 14 days total  Can switch to po Augmentin and doxycyline on discharge       Chronic combined systolic and diastolic congestive heart failure  Patient has Combined Systolic and Diastolic heart failure that is Chronic. On presentation their CHF was well  compensated. Most recent BNP and echo results are listed below.  Recent Labs     10/29/24  0853   BNP 1,478*     Latest ECHO  Results for orders placed during the hospital encounter of 05/07/24    Echo    Interpretation Summary    Left Ventricle: The left ventricle is mildly dilated. Ventricular mass is normal. Normal wall thickness. Severe global hypokinesis present. There is severely reduced systolic function. Ejection fraction by visual approximation is 15%. There is diastolic dysfunction.    Right Ventricle: Severe right ventricular enlargement. Wall thickness is normal. Right ventricle wall motion has global hypokinesis. Systolic function is mildly reduced.    Left Atrium: Left atrium is severely dilated. There is an atrial septal aneurysm.    Right Atrium: Right atrium is dilated.    Aortic Valve: There is mild aortic regurgitation.    Mitral Valve: There is mild regurgitation.    Tricuspid Valve: There is annular dilation present. There is normal leaflet mobility. There is severe functional regurgitation.    Pulmonary Artery: The estimated pulmonary artery systolic pressure is at least 24 mmHg. PASP is likely under-estimated in the setting of severe tricuspid regurgitation.    IVC/SVC: Central venous pressure of at least 8 mmHg.    No vegetation seen.    Current Heart Failure Medications       Plan  - Monitor strict I&Os and daily weights.    - Place on telemetry  - Low sodium diet  - Place on fluid restriction of 1 L.   - Cardiology has not been consulted  - The patient's volume status is stable but not at their baseline as indicated by edema- UF with HD as tolerated. Lasix DC'd 10/30 due to low BP        Anemia due to chronic kidney disease, on chronic dialysis  Anemia is likely due to acute blood loss which was from bleeding from L stump wound . Most recent hemoglobin and hematocrit are listed below.  Recent Labs     11/03/24  0553 11/03/24  1024 11/03/24  1432   HGB 8.2* 7.3* 7.8*   HCT 25.0* 21.5* 23.0*      Plan  - Monitor serial CBC: Daily-- sooner if re bleeding occurs  - Transfuse PRBC if patient becomes hemodynamically unstable, symptomatic or H/H drops below 7/21.  - Patient has received 3 units of PRBCs on 11/3  - Patient's anemia is currently stable  - acute on chronic anemia due to bleed from L stump wound.    Peripheral vascular disease  On Eliquis/ plavix--- held due to bleed from the stump  Resume plavix and monitor       VTE Risk Mitigation (From admission, onward)           Ordered     IP VTE HIGH RISK PATIENT  Once         10/28/24 2114     Place sequential compression device  Until discontinued         10/28/24 2114     Reason for No Pharmacological VTE Prophylaxis  Once        Question:  Reasons:  Answer:  Already adequately anticoagulated on oral Anticoagulants    10/28/24 2114     heparin (porcine) injection 3,600 Units  As needed (PRN)         10/28/24 2114                    Discharge Planning   DEVAN: 11/8/2024     Code Status: Full Code   Is the patient medically ready for discharge?:     Reason for patient still in hospital (select all that apply): Patient trending condition, Treatment, and Pending disposition  Discharge Plan A: Skilled Nursing Facility ([facility])            Critical care time spent on the evaluation and treatment of severe organ dysfunction, review of pertinent labs and imaging studies, discussions with consulting providers and discussions with patient/family: 20 minutes.      Chivo Parra MD  Department of [facility] Medicine   [place] - Intensive Care

## 2024-11-05 NOTE — PLAN OF CARE
POC reviewed with pt. Pt verbalizes understanding. Pt NSR on tele, BP stable. DIANNEerik WELLINGTON RA. Disoriented to time and intermittently asks the same questions. She seems very confused, and stated that she was going to get out of bed and get some ice, despite being on bedrest. Pt complains of pain and cries but refuses pain medications.      Problem: Adult Inpatient Plan of Care  Goal: Plan of Care Review  11/5/2024 0555 by Chante Diaz RN  Outcome: Progressing  11/5/2024 0007 by Chante Diaz RN  Outcome: Progressing     Problem: Adult Inpatient Plan of Care  Goal: Absence of Hospital-Acquired Illness or Injury  11/5/2024 0555 by Chante Diaz RN  Outcome: Progressing  11/5/2024 0007 by Chante Diaz RN  Outcome: Progressing     Problem: Adult Inpatient Plan of Care  Goal: Optimal Comfort and Wellbeing  11/5/2024 0555 by Chante Diaz RN  Outcome: Progressing  11/5/2024 0007 by Chante Diaz RN  Outcome: Progressing     Problem: Adult Inpatient Plan of Care  Goal: Readiness for Transition of Care  11/5/2024 0555 by Chante Diaz RN  Outcome: Progressing  11/5/2024 0007 by Chante Diaz RN  Outcome: Progressing     Problem: Wound  Goal: Optimal Coping  11/5/2024 0555 by Chante Diaz RN  Outcome: Progressing  11/5/2024 0007 by Chante Diaz RN  Outcome: Progressing     Problem: Skin or Soft Tissue Infection  Goal: Absence of Infection Signs and Symptoms  11/5/2024 0555 by Chante Diaz RN  Outcome: Progressing  11/5/2024 0007 by Chante Diaz RN  Outcome: Progressing

## 2024-11-05 NOTE — PROGRESS NOTES
[facility] ID note    Patient afebrile.  WBC 15.5.  On piperacillin tazobactam and vancomycin.  Antibiotic day 8    No new culture data    Complete 14 day course of treatment for left thigh wound.  Reassess at that time.    We will sign off at this time.    Please call if any questions   Juarez Gamboa  [facility] ID  [phone]

## 2024-11-05 NOTE — PROGRESS NOTES
Wound care follow up:  Pt resistant to dressing change and cleaning of stool.  Wound to L AKA stump remains with forming eschar- scant drainage- mild odor- recommend to continue with Aquacel AG but will decrease frequency to 3x/week- notified Dr. Reid.        Wounds to gluteal folds/perineum that were present on admit continue- flexiseal leaking and nurse flushed it while cleaning patient- triad ointment applied as directed.

## 2024-11-05 NOTE — NURSING TRANSFER
Nursing Transfer Note      11/5/2024   3:00 PM    Nurse giving handoff:DEANGELO Caldera  Nurse receiving handoff: DEANGELO Trinidad     Reason patient is being transferred: step down     Transfer To: 404    Transfer via bed    Transfer with cardiac monitoring    Transported by nurse     Transfer Vital Signs:  Blood Pressure:108/58  Heart Rate:90  O2:99%  Temperature: 98.7  Respirations:12    Telemetry: Box Number [id number]  Order for Tele Monitor? Yes    Additional Lines: rectal tube     Medicines sent: lantus and novolog pen     Patient belongings transferred with patient: Yes    Chart send with patient: Yes    Notified: mother - Ms.Armetha Sal        Upon arrival to floor: cardiac monitor applied, patient oriented to room, call bell in reach, and bed in lowest position

## 2024-11-05 NOTE — PROGRESS NOTES
Nephrology Progress Note     Consult Requested By: Chivo Parra MD  Reason for Consult: ESRD    SUBJECTIVE:     Review of Systems   Constitutional:  Positive for malaise/fatigue. Negative for chills and fever.   HENT:  Negative for congestion and sore throat.    Eyes:  Negative for blurred vision, double vision and photophobia.   Respiratory:  Negative for cough and shortness of breath.    Cardiovascular:  Negative for chest pain, palpitations and leg swelling.   Gastrointestinal:  Negative for abdominal pain, diarrhea, nausea and vomiting.   Genitourinary:  Negative for dysuria and urgency.   Musculoskeletal:  Negative for joint pain and myalgias.   Skin:  Negative for itching and rash.   Neurological:  Positive for weakness. Negative for dizziness, sensory change and headaches.   Endo/Heme/Allergies:  Negative for polydipsia. Does not bruise/bleed easily.   Psychiatric/Behavioral:  Negative for depression.        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    Osteomyelitis     Osteomyelitis of left foot 04/30/2021    Ulcer of left foot     Vaginal delivery     x1     Past Surgical History:   Procedure Laterality Date    ABOVE-KNEE AMPUTATION Left 5/18/2021    Procedure: AMPUTATION, ABOVE KNEE;  Surgeon: Teddy Huber MD;  Location: [address];  Service: Vascular;  Laterality: Left;    ABOVE-KNEE AMPUTATION Right 3/18/2022    Procedure: AMPUTATION, ABOVE KNEE;  Surgeon: DAYNE Florez II, MD;  Location: [facility] OR [address] [place] [address];  Service: Vascular;  Laterality: Right;    Angiogram - Right Extremity Right 7/9/15    angiogram-left leg  10/6/15    ANGIOGRAPHY OF LOWER EXTREMITY Left 4/29/2021    Procedure: ANGIOGRAM, LOWER EXTREMITY;   Surgeon: Teddy Huber MD;  [facility];  Service: Vascular;  Laterality: Left;    BELOW KNEE AMPUTATION OF LOWER EXTREMITY Right 12/28/2021    Procedure: AMPUTATION, BELOW KNEE;  Surgeon: Kaitlyn Rojas MD;  Location: [facility];  Service: General;  Laterality: Right;    CATHETERIZATION OF BOTH LEFT AND RIGHT HEART N/A 12/18/2019    Procedure: CATHETERIZATION, HEART, BOTH LEFT AND RIGHT;  Surgeon: Que Fernando III, MD;  Location: [facility] C[facility];  Service: Cardiology;  Laterality: N/A;    CORONARY ANGIOGRAPHY N/A 12/18/2019    Procedure: ANGIOGRAM, CORONARY ARTERY;  Surgeon: Que Fernando III, MD;  Location: [facility] CATH LAB;  Service: Cardiology;  Laterality: N/A;    CORONARY ANGIOGRAPHY INCLUDING BYPASS GRAFTS WITH CATHETERIZATION OF LEFT HEART N/A 7/28/2020    Procedure: ANGIOGRAM, CORONARY, INCLUDING BYPASS GRAFT, WITH LEFT HEART CATHETERIZATION, 9 am;  Surgeon: Rachel Easley MD;  Location: [facility] CATH LAB;  Service: Cardiology;  Laterality: N/A;    CORONARY ARTERY BYPASS GRAFT (CABG) N/A 1/14/2020    Procedure: CORONARY ARTERY BYPASS GRAFT (CABG) x 1     Off Pump;  Surgeon: Huang Altamirano MD;  [facility];  Service: Cardiovascular;  Laterality: N/A;    CREATION OF FEMORAL-TIBIAL ARTERY BYPASS Left 4/29/2021    Procedure: CREATION, BYPASS, ARTERIAL, FEMORAL TO ANTERIOR TIBIAL;  Surgeon: Teddy Huber MD;  [facility];  Service: Vascular;  Laterality: Left;    CREATION OF FEMOROPOPLITEAL ARTERIAL BYPASS USING GRAFT Left 8/18/2020    Procedure: CREATION, BYPASS, ARTERIAL, FEMORAL TO POPLITEAL, USING GRAFT, LEFT LOWER EXTREMITY;  Surgeon: Teddy Huber MD;  Location: [facility];  Service: Vascular;  Laterality: Left;  REQUEST 7:15 A.M. START----COVID NEGATIVE ON 8/17  1ST CASE STARTE PER LEANA ON 8/7/2020 @ 942AM-LO  RN PREOP 8/12/2020   T/S-----CLEARED BY CARDS-------PENDING INSURANCE    DEBRIDEMENT OF FOOT Left 9/8/2020    Procedure:  DEBRIDEMENT, FOOT;  Surgeon: Rosio Mayes DPM;  Location: [facility] OR;  Service: Podiatry;  Laterality: Left;  request neoxx .   RN Pre Op 9-4-2020, Covid negative on 9/5/20. C A    DEBRIDEMENT OF FOOT  3/4/2021    Procedure: DEBRIDEMENT, FOOT;  Surgeon: Teddy Huber MD;  Location: [facility] OR;  Service: Vascular;;    DEBRIDEMENT OF FOOT Left 3/9/2021    Procedure: DEBRIDEMENT, FOOT, bone biopsy;  Surgeon: Rosio Mayes DPM;  Location: [facility] OR;  Service: Podiatry;  Laterality: Left;  Request neoxx---COVID IN AM  REQUESTING NOON START  RN Phone Pre op.On Blood thinners Plavix and Eliquis.  Covid am of surgery. C A    DEBRIDEMENT OF FOOT Left 5/4/2021    Procedure: DEBRIDEMENT, FOOT;  Surgeon: Farooq Morley DPM;  [address];  Service: Podiatry;  Laterality: Left;    INSERTION OF TUNNELED CENTRAL VENOUS HEMODIALYSIS CATHETER N/A 1/27/2020    Procedure: Insertion, Catheter, Central Venous, Hemodialysis;  Surgeon: ESTEBAN Gomez III, MD;  Location: [facility] CATH LAB;  Service: Peripheral Vascular;  Laterality: N/A;    INSERTION OF TUNNELED CENTRAL VENOUS HEMODIALYSIS CATHETER  5/10/2023    Procedure: Insertion, Catheter, Central Venous, Hemodialysis;  Surgeon: Romulo Queen MD;  Location: [facility] CATH LAB;  Service: Cardiology;;    PERCUTANEOUS TRANSLUMINAL ANGIOPLASTY N/A 3/4/2021    Procedure: PTA (ANGIOPLASTY, PERCUTANEOUS, TRANSLUMINAL);  Surgeon: Teddy Huber MD;  Location: [facility] OR;  Service: Vascular;  Laterality: N/A;    REMOVAL OF ARTERIOVENOUS GRAFT Left 5/27/2021    Procedure: REMOVAL, GRAFT, LEFT LOWER EXTREMITY, WOUND EXPLORATION;  Surgeon: Teddy Huber MD;  [facility];  Service: Vascular;  Laterality: Left;    REMOVAL OF NAIL OF DIGIT Left 3/9/2021    Procedure: REMOVAL, NAIL, DIGIT;  Surgeon: Rosio Mayes DPM;  Location: [facility] OR;  Service: Podiatry;  Laterality: Left;    RIGHT HEART CATHETERIZATION Right 5/10/2023    Procedure: INSERTION, CATHETER,  RIGHT HEART;  Surgeon: Romulo Queen MD;  Location: [facility] CATH LAB;  Service: Cardiology;  Laterality: Right;    THROMBECTOMY Left 3/4/2021    Procedure: THROMBECTOMY, LEFT LOWER EXTREMITY BYPASS GRAFT, ANGIOGRAM, POSSIBLE INTERVENTION, POSSIBLE LEFT LOWER EXTREMITY BYPASS;  Surgeon: Teddy Huber MD;  Location: [facility] OR;  Service: Vascular;  Laterality: Left;    THROMBECTOMY Left 4/29/2021    Procedure: GRAFT THROMBECTOMY, LEFT LOWER EXTREMITY;  Surgeon: Teddy Huber MD;  Location: [facility] OR 2ND FLR;  Service: Vascular;  Laterality: Left;  14.5 min  1179.85 mGy  341.01 Gycm2  240 ml dye    THROMBECTOMY  10/22/2021    Procedure: THROMBECTOMY;  Surgeon: Saad Arenas MD;  Location: [facility] CATH LAB/EP;  Service: Cardiology;;     Family History   Problem Relation Name Age of Onset    Diabetes Mother      Diabetes Father      Heart disease Maternal Grandmother      No Known Problems Maternal Grandfather      Diabetes Paternal Grandmother      No Known Problems Paternal Grandfather      Anesthesia problems Neg Hx       Social History     Tobacco Use    Smoking status: Former    Smokeless tobacco: Never   Substance Use Topics    Alcohol use: No    Drug use: Yes     Types: Marijuana     Comment: occassional       Review of patient's allergies indicates:   Allergen Reactions    Ciprofloxacin Itching    Iodine      Kidney injury    Pcn [penicillins]      Rash; tolerated ceftriaxone on 1/13/20            OBJECTIVE:     Vital Signs (Most Recent)  Vitals:    11/05/24 0830 11/05/24 0838 11/05/24 0900 11/05/24 0930   BP: (!) 136/57  (!) 118/55 (!) 107/51   Pulse: 92  83 84   Resp: 16 14 11 10   Temp:       TempSrc:       SpO2: 100%  98% 97%   Weight:       Height:             Date 11/05/24 0700 - 11/06/24 0659   Shift 2010-2721 5830-0097 4214-7530 24 Hour Total   INTAKE   P.O. 337   337   IV Piggyback 259.2   259.2   Shift Total(mL/kg) 596.2(8.8)   596.2(8.8)   OUTPUT   Shift Total(mL/kg)       Weight (kg) 68 68 68  68           Medications:   atorvastatin  80 mg Oral Daily    [START ON 11/8/2024] cinacalcet  30 mg Oral Every Mon, Wed, Fri    insulin glargine U-100  5 Units Subcutaneous QHS    piperacillin-tazobactam (Zosyn) IV (PEDS and ADULTS) (extended infusion is not appropriate)  4.5 g Intravenous Q12H    pregabalin  50 mg Oral Daily    sertraline  100 mg Oral Daily           Physical Exam  Vitals and nursing note reviewed.   Constitutional:       General: She is not in acute distress.     Appearance: She is not diaphoretic.      Comments: Somnolent   HENT:      Head: Normocephalic and atraumatic.      Mouth/Throat:      Pharynx: No oropharyngeal exudate.   Eyes:      General: No scleral icterus.     Conjunctiva/sclera: Conjunctivae normal.      Pupils: Pupils are equal, round, and reactive to light.   Cardiovascular:      Rate and Rhythm: Normal rate and regular rhythm.      Heart sounds: Normal heart sounds. No murmur heard.  Pulmonary:      Effort: Pulmonary effort is normal. No respiratory distress.      Breath sounds: Normal breath sounds.   Abdominal:      General: Bowel sounds are normal. There is no distension.      Palpations: Abdomen is soft.      Tenderness: There is no abdominal tenderness.   Musculoskeletal:         General: Normal range of motion.      Cervical back: Normal range of motion and neck supple.      Comments: LIJ CVC  Bilat AKA   Skin:     General: Skin is warm and dry.      Findings: No erythema.   Neurological:      Mental Status: She is alert and oriented to person, place, and time.      Cranial Nerves: No cranial nerve deficit.      Motor: Weakness present.   Psychiatric:         Mood and Affect: Affect normal.         Cognition and Memory: Memory normal.         Judgment: Judgment normal.         Laboratory:  Recent Labs   Lab 11/02/24  2325 11/03/24  0553 11/03/24  1024 11/03/24  1432 11/04/24  0521 11/05/24  0600   WBC 11.70 14.24* 15.75* 24.77* 15.20* 15.53*   HGB 6.6* 8.2* 7.3* 7.8* 8.0*  7.8*   HCT 20.7* 25.0* 21.5* 23.0* 25.8* 24.4*    291 278 284 290 343   MONO 13.3  1.6* 10.1  1.4* 10.6  1.7*  --   --   --    EOSINOPHIL 1.3 0.4 0.5  --   --   --      Recent Labs   Lab 11/03/24  0553 11/04/24  0822 11/05/24  0600   * 132* 140   K 4.4 4.8 4.5   CL 97 97 108   CO2 25 18* 19*   BUN 30* 48* 20   CREATININE 3.3* 4.2* 2.8*   CALCIUM 7.5* 7.8* 8.5*   PHOS 2.9 2.8 2.9       Diagnostic Results:  X-Ray: Reviewed  US: Reviewed  Echo: Reviewed  ASSESSMENT/PLAN:     1. ESRD   -- TThSat at [facility], Dr. Payne  -- MWF while here  -- HD yesterday with UF, no indication for HD today    2. HTN  -- Controlled off meds    3. Anemia of chronic kidney disease treated with NAEEM  EPogen with each HD  Acute blood loss s/p transfusion  Recent Labs   Lab 11/03/24  1432 11/04/24  0521 11/05/24  0600   HGB 7.8* 8.0* 7.8*   HCT 23.0* 25.8* 24.4*    290 343       Iron  Lab Results   Component Value Date    IRON 94 06/22/2024    TIBC 265 06/22/2024    FERRITIN 757 (H) 06/22/2024       4. MBD - Start Calcitriol, continue Cinacalcet  Recent Labs   Lab 11/05/24  0600   CALCIUM 8.5*   PHOS 2.9     Recent Labs   Lab 11/01/24  0351 11/02/24  0534 11/03/24  0553   MG 1.9 2.1 1.8       Lab Results   Component Value Date    .8 (H) 10/28/2024    CALCIUM 8.5 (L) 11/05/2024    CAION 0.92 (L) 05/17/2023    PHOS 2.9 11/05/2024     Lab Results   Component Value Date    [id number] 9 (L) 10/28/2024     Acidosis - add Bicarb 650 TID  Lab Results   Component Value Date    CO2 19 (L) 11/05/2024       5. Hemodialysis Access - LIJ Permacath    6. Nutrition/Hypoalbuminemia  Recent Labs   Lab 11/04/24  0822 11/05/24  0600   ALBUMIN 1.8* 1.9*     Nepro with meals TID. Renal vitamins daily      Thank you for the consult, will follow  With any question please call   Sima Barrientos MD    [facility]  RAUL Back MD,   MD ANDREA Melton MD E. V. Harmon, NP    [address]  [place], [address]  [phone]  After hours [phone]

## 2024-11-06 LAB
ALBUMIN SERPL BCP-MCNC: 1.7 G/DL (ref 3.5–5.2)
ANION GAP SERPL CALC-SCNC: 10 MMOL/L (ref 8–16)
BASOPHILS # BLD AUTO: 0.11 K/UL (ref 0–0.2)
BASOPHILS NFR BLD: 0.6 % (ref 0–1.9)
BLD PROD TYP BPU: NORMAL
BLOOD UNIT EXPIRATION DATE: NORMAL
BLOOD UNIT TYPE CODE: 5100
BLOOD UNIT TYPE: NORMAL
BUN SERPL-MCNC: 32 MG/DL (ref 6–20)
CALCIUM SERPL-MCNC: 7.8 MG/DL (ref 8.7–10.5)
CHLORIDE SERPL-SCNC: 99 MMOL/L (ref 95–110)
CO2 SERPL-SCNC: 21 MMOL/L (ref 23–29)
CODING SYSTEM: NORMAL
CREAT SERPL-MCNC: 3.8 MG/DL (ref 0.5–1.4)
CROSSMATCH INTERPRETATION: NORMAL
DIFFERENTIAL METHOD BLD: ABNORMAL
DISPENSE STATUS: NORMAL
EOSINOPHIL # BLD AUTO: 0.2 K/UL (ref 0–0.5)
EOSINOPHIL NFR BLD: 1.2 % (ref 0–8)
ERYTHROCYTE [DISTWIDTH] IN BLOOD BY AUTOMATED COUNT: 18.6 % (ref 11.5–14.5)
ERYTHROCYTE [DISTWIDTH] IN BLOOD BY AUTOMATED COUNT: 19.3 % (ref 11.5–14.5)
EST. GFR  (NO RACE VARIABLE): 13 ML/MIN/1.73 M^2
GLUCOSE SERPL-MCNC: 188 MG/DL (ref 70–110)
HCT VFR BLD AUTO: 19 % (ref 37–48.5)
HCT VFR BLD AUTO: 21.3 % (ref 37–48.5)
HGB BLD-MCNC: 6 G/DL (ref 12–16)
HGB BLD-MCNC: 7 G/DL (ref 12–16)
IMM GRANULOCYTES # BLD AUTO: 0.19 K/UL (ref 0–0.04)
IMM GRANULOCYTES NFR BLD AUTO: 1 % (ref 0–0.5)
LYMPHOCYTES # BLD AUTO: 0.9 K/UL (ref 1–4.8)
LYMPHOCYTES NFR BLD: 4.8 % (ref 18–48)
MCH RBC QN AUTO: 30.9 PG (ref 27–31)
MCH RBC QN AUTO: 31.1 PG (ref 27–31)
MCHC RBC AUTO-ENTMCNC: 31.6 G/DL (ref 32–36)
MCHC RBC AUTO-ENTMCNC: 32.9 G/DL (ref 32–36)
MCV RBC AUTO: 95 FL (ref 82–98)
MCV RBC AUTO: 98 FL (ref 82–98)
MONOCYTES # BLD AUTO: 1.2 K/UL (ref 0.3–1)
MONOCYTES NFR BLD: 6.3 % (ref 4–15)
NEUTROPHILS # BLD AUTO: 16.3 K/UL (ref 1.8–7.7)
NEUTROPHILS NFR BLD: 86.1 % (ref 38–73)
NRBC BLD-RTO: 0 /100 WBC
PHOSPHATE SERPL-MCNC: 3 MG/DL (ref 2.7–4.5)
PLATELET # BLD AUTO: 310 K/UL (ref 150–450)
PLATELET # BLD AUTO: 310 K/UL (ref 150–450)
PMV BLD AUTO: 10.5 FL (ref 9.2–12.9)
PMV BLD AUTO: 10.8 FL (ref 9.2–12.9)
POCT GLUCOSE: 139 MG/DL (ref 70–110)
POCT GLUCOSE: 179 MG/DL (ref 70–110)
POCT GLUCOSE: 208 MG/DL (ref 70–110)
POTASSIUM SERPL-SCNC: 4.3 MMOL/L (ref 3.5–5.1)
RBC # BLD AUTO: 1.94 M/UL (ref 4–5.4)
RBC # BLD AUTO: 2.25 M/UL (ref 4–5.4)
SODIUM SERPL-SCNC: 130 MMOL/L (ref 136–145)
TRANS ERYTHROCYTES VOL PATIENT: NORMAL ML
VANCOMYCIN SERPL-MCNC: 17 UG/ML
WBC # BLD AUTO: 13.68 K/UL (ref 3.9–12.7)
WBC # BLD AUTO: 18.98 K/UL (ref 3.9–12.7)

## 2024-11-06 PROCEDURE — 80100016 HC MAINTENANCE HEMODIALYSIS: Mod: HCNC

## 2024-11-06 PROCEDURE — 85027 COMPLETE CBC AUTOMATED: CPT | Mod: HCNC | Performed by: FAMILY MEDICINE

## 2024-11-06 PROCEDURE — 86920 COMPATIBILITY TEST SPIN: CPT | Mod: HCNC | Performed by: FAMILY MEDICINE

## 2024-11-06 PROCEDURE — 25000003 PHARM REV CODE 250: Mod: HCNC | Performed by: INTERNAL MEDICINE

## 2024-11-06 PROCEDURE — 63600175 PHARM REV CODE 636 W HCPCS: Mod: HCNC | Performed by: INTERNAL MEDICINE

## 2024-11-06 PROCEDURE — 80202 ASSAY OF VANCOMYCIN: CPT | Mod: HCNC | Performed by: STUDENT IN AN ORGANIZED HEALTH CARE EDUCATION/TRAINING PROGRAM

## 2024-11-06 PROCEDURE — 27000207 HC ISOLATION: Mod: HCNC

## 2024-11-06 PROCEDURE — 36415 COLL VENOUS BLD VENIPUNCTURE: CPT | Mod: HCNC | Performed by: STUDENT IN AN ORGANIZED HEALTH CARE EDUCATION/TRAINING PROGRAM

## 2024-11-06 PROCEDURE — 36430 TRANSFUSION BLD/BLD COMPNT: CPT | Mod: HCNC

## 2024-11-06 PROCEDURE — 63600175 PHARM REV CODE 636 W HCPCS: Mod: HCNC | Performed by: STUDENT IN AN ORGANIZED HEALTH CARE EDUCATION/TRAINING PROGRAM

## 2024-11-06 PROCEDURE — 99900035 HC TECH TIME PER 15 MIN (STAT): Mod: HCNC

## 2024-11-06 PROCEDURE — 11000001 HC ACUTE MED/SURG PRIVATE ROOM: Mod: HCNC

## 2024-11-06 PROCEDURE — 85025 COMPLETE CBC W/AUTO DIFF WBC: CPT | Mod: HCNC | Performed by: STUDENT IN AN ORGANIZED HEALTH CARE EDUCATION/TRAINING PROGRAM

## 2024-11-06 PROCEDURE — 25000003 PHARM REV CODE 250: Mod: HCNC | Performed by: STUDENT IN AN ORGANIZED HEALTH CARE EDUCATION/TRAINING PROGRAM

## 2024-11-06 PROCEDURE — 94660 CPAP INITIATION&MGMT: CPT | Mod: HCNC

## 2024-11-06 PROCEDURE — 99223 1ST HOSP IP/OBS HIGH 75: CPT | Mod: HCNC,GC,, | Performed by: INTERNAL MEDICINE

## 2024-11-06 PROCEDURE — 94761 N-INVAS EAR/PLS OXIMETRY MLT: CPT | Mod: HCNC

## 2024-11-06 PROCEDURE — 25000003 PHARM REV CODE 250: Mod: HCNC

## 2024-11-06 PROCEDURE — P9021 RED BLOOD CELLS UNIT: HCPCS | Mod: HCNC | Performed by: FAMILY MEDICINE

## 2024-11-06 PROCEDURE — 80069 RENAL FUNCTION PANEL: CPT | Mod: HCNC | Performed by: STUDENT IN AN ORGANIZED HEALTH CARE EDUCATION/TRAINING PROGRAM

## 2024-11-06 RX ORDER — PANTOPRAZOLE SODIUM 40 MG/10ML
40 INJECTION, POWDER, LYOPHILIZED, FOR SOLUTION INTRAVENOUS DAILY
Status: DISCONTINUED | OUTPATIENT
Start: 2024-11-06 | End: 2024-11-08

## 2024-11-06 RX ORDER — HYDROCODONE BITARTRATE AND ACETAMINOPHEN 500; 5 MG/1; MG/1
TABLET ORAL
Status: DISCONTINUED | OUTPATIENT
Start: 2024-11-06 | End: 2024-11-12 | Stop reason: HOSPADM

## 2024-11-06 RX ORDER — SODIUM CHLORIDE 9 MG/ML
INJECTION, SOLUTION INTRAVENOUS ONCE
Status: CANCELLED | OUTPATIENT
Start: 2024-11-06 | End: 2024-11-06

## 2024-11-06 RX ORDER — SODIUM CHLORIDE 9 MG/ML
INJECTION, SOLUTION INTRAVENOUS
Status: CANCELLED | OUTPATIENT
Start: 2024-11-06

## 2024-11-06 RX ADMIN — OXYCODONE HYDROCHLORIDE 10 MG: 10 TABLET, FILM COATED, EXTENDED RELEASE ORAL at 08:11

## 2024-11-06 RX ADMIN — OXYCODONE HYDROCHLORIDE 10 MG: 10 TABLET, FILM COATED, EXTENDED RELEASE ORAL at 03:11

## 2024-11-06 RX ADMIN — INSULIN ASPART 4 UNITS: 100 INJECTION, SOLUTION INTRAVENOUS; SUBCUTANEOUS at 06:11

## 2024-11-06 RX ADMIN — PIPERACILLIN SODIUM AND TAZOBACTAM SODIUM 4.5 G: 4; .5 INJECTION, POWDER, LYOPHILIZED, FOR SOLUTION INTRAVENOUS at 06:11

## 2024-11-06 RX ADMIN — ACETAMINOPHEN 650 MG: 325 TABLET ORAL at 10:11

## 2024-11-06 RX ADMIN — Medication 1 CAPSULE: at 09:11

## 2024-11-06 RX ADMIN — SERTRALINE HYDROCHLORIDE 100 MG: 100 TABLET ORAL at 09:11

## 2024-11-06 RX ADMIN — HEPARIN SODIUM 3600 UNITS: 1000 INJECTION, SOLUTION INTRAVENOUS; SUBCUTANEOUS at 10:11

## 2024-11-06 RX ADMIN — CALCITRIOL CAPSULES 0.25 MCG 0.5 MCG: 0.25 CAPSULE ORAL at 09:11

## 2024-11-06 RX ADMIN — ATORVASTATIN CALCIUM 80 MG: 40 TABLET, FILM COATED ORAL at 09:11

## 2024-11-06 RX ADMIN — PIPERACILLIN SODIUM AND TAZOBACTAM SODIUM 4.5 G: 4; .5 INJECTION, POWDER, LYOPHILIZED, FOR SOLUTION INTRAVENOUS at 03:11

## 2024-11-06 RX ADMIN — INSULIN GLARGINE 5 UNITS: 100 INJECTION, SOLUTION SUBCUTANEOUS at 10:11

## 2024-11-06 RX ADMIN — SODIUM BICARBONATE 650 MG TABLET 650 MG: at 03:11

## 2024-11-06 RX ADMIN — VANCOMYCIN HYDROCHLORIDE 500 MG: 500 INJECTION, POWDER, LYOPHILIZED, FOR SOLUTION INTRAVENOUS at 05:11

## 2024-11-06 RX ADMIN — SODIUM BICARBONATE 650 MG TABLET 650 MG: at 10:11

## 2024-11-06 RX ADMIN — PANTOPRAZOLE SODIUM 40 MG: 40 INJECTION, POWDER, LYOPHILIZED, FOR SOLUTION INTRAVENOUS at 05:11

## 2024-11-06 RX ADMIN — ONDANSETRON 4 MG: 2 INJECTION INTRAMUSCULAR; INTRAVENOUS at 10:11

## 2024-11-06 RX ADMIN — INSULIN ASPART 2 UNITS: 100 INJECTION, SOLUTION INTRAVENOUS; SUBCUTANEOUS at 04:11

## 2024-11-06 RX ADMIN — PREGABALIN 50 MG: 50 CAPSULE ORAL at 09:11

## 2024-11-06 RX ADMIN — SODIUM BICARBONATE 650 MG TABLET 650 MG: at 09:11

## 2024-11-06 NOTE — ASSESSMENT & PLAN NOTE
Managed per primary team     Spoke with patient to discuss visco injection for her right knee.  Explained that insurance requires her to try the steroid injection first.   Explained that she can get a steroid injection ever  3 months and the gel every 6 months. Discussed RICE and patient requested to be scheduled with Dr Page or IVAN Lozano in [place].

## 2024-11-06 NOTE — MEDICAL/APP STUDENT
[facility]     CC: Symptomatic Anemia    HPI: 59 y.o. female with a history of HTN, HLD, DM2, ESRD on HD presents with symptomatic anemia. The anemia was originally attributed to bleeding from her L leg stump; however, she had an acute drop in her hemoglobin overnight from 8.0 to 6.0 and her stump site is C/D/I. Patient experienced multiple dark, melenic BMs.    Previously on eliquis and plavix which has been held in the setting of acute blood loss. Last HD 11/6/2024. Currently on vanc/zosyn.     Past Medical History  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    Osteomyelitis     Osteomyelitis of left foot 04/30/2021    Ulcer of left foot     Vaginal delivery     x1     Physical Examination  /62 (BP Location: Right arm, Patient Position: Lying)   Pulse 84   Temp 98.1 °F (36.7 °C) (Axillary)   Resp 18   Ht 4' (1.219 m)   Wt 71.2 kg (156 lb 15.5 oz)   LMP 09/30/2015 (Exact Date)   SpO2 100%   BMI 47.90 kg/m²   General appearace: well appearing, no acute distress, alert   HEENT: NC/AT, EOMI, anicteric sclera, MMM  Abdomen: Soft, NT/ND, no rebound or guarding, normoactive bowel sounds    Labs:  [unfilled]    External medical records reviewed including external documents     Assessment / Recommendations:    (This is an acute illness requiring hospitalization and poses a threat to life or bodily function)  (This is a chronic illness with severe exacerbation, progression or side effect from treatment)    Assessment: 59 y.o. female with a history of HTN, HLD, DM2, ESRD on HD was consulted for symptomatic anemia. Patient had an acute drop in her hemoglobin from 8.0 to 6.0 after receiving 3 units of pRBC. Her stump site is C/D/I.  Multiple loose, black stool without other overt sources of bleeding. Currently has a rectal tube in place in the setting of sacral ulcer with ongoing pasty black stool.    Plan:   - NPO at [facility] for EGD tomorrow  - Monitor CBC  - Transfuse PRBC if patient becomes hemodynamically unstable or hemoglobin drops below 7    Priscilla Santana MS4  [facility]

## 2024-11-06 NOTE — PT/OT/SLP PROGRESS
Physical Therapy      Patient Name:  Suyapa Connelly   MRN:  [id number]      Patient not seen today secondary to Therapist assessment, Blood transfusion  Patient noted with low H/H (6/19) this date; nsg currently administering 1U PRBCs.    11/6/2024

## 2024-11-06 NOTE — PROGRESS NOTES
[facility] Medicine  Progress Note    Patient Name: Suyapa Connelly  MRN: [id number]  Patient Class: IP- Inpatient   Admission Date: 10/28/2024  Length of Stay: 9 days  Attending Physician: Chivo Parra MD  Primary Care Provider: Vanessa Noel MD        Subjective:     Principal Problem:Cellulitis of left thigh        HPI:  Patient is a 59-year-old female with a known past medical history of hypertension, hyperlipidemia, diabetes mellitus type 2, ESRD on HD, and deconditioning that presents the ER after being on floor throughout the evening.     Patient presents after being found on the floor by a neighbor today.  Patient reports that she got out of bed to change her pad and diaper but was unable to get back into bed, staying on the floor throughout the evening.  Patient's  who normally cares for her, was arrested yesterday and is in [facility] and patient was unable to care for herself.  When patient was found by neighbor on floor, EMS was called and patient was brought to the ER for further evaluation.  On arrival to ER, patient in no acute distress.  Labs essentially within normal limits.  Patient last dialyzed on Thursday and no signs of fluid overload or electrolyte abnormalities.  Patient has no family NS able to assist her at home other than her  who is currently incarcerated.  Patient admitted for social problems and hemodialysis.    Patient due to her open wounds and CT finding of her left thigh - emphysema can not rule out infection -     Overview/Hospital Course:  No notes on file    Interval History:     Patient is calm and cooperative  She was noted with Hgn drop this morning , the left thigh stump with no apparent bleeding  Patient is having loose dark green/black stool concerning for GI bleed, 1 U PRBC is ordered and GI consulted , EGD is scheduled for tomorrow    Review of Systems   Constitutional: Negative.    HENT: Negative.     Respiratory: Negative.      Cardiovascular: Negative.    Gastrointestinal: Negative.    Genitourinary: Negative.    Musculoskeletal:  Positive for myalgias.   Skin: Negative.    Neurological: Negative.    Psychiatric/Behavioral: Negative.       Objective:     Vital Signs (Most Recent):  Temp: 98.6 °F (37 °C) (11/06/24 1331)  Pulse: 84 (11/06/24 1331)  Resp: 18 (11/06/24 1331)  BP: (!) 115/56 (11/06/24 1331)  SpO2: 98 % (11/06/24 1331) Vital Signs (24h Range):  Temp:  [97.5 °F (36.4 °C)-98.7 °F (37.1 °C)] 98.6 °F (37 °C)  Pulse:  [] 84  Resp:  [12-20] 18  SpO2:  [96 %-100 %] 98 %  BP: (105-138)/(52-73) 115/56     Weight: 71.2 kg (156 lb 15.5 oz)  Body mass index is 47.9 kg/m².    Intake/Output Summary (Last 24 hours) at 11/6/2024 1424  Last data filed at 11/6/2024 1255  Gross per 24 hour   Intake 360 ml   Output 0 ml   Net 360 ml         Physical Exam  Constitutional:       Appearance: Normal appearance.   Cardiovascular:      Rate and Rhythm: Normal rate and regular rhythm.   Pulmonary:      Effort: Pulmonary effort is normal. No respiratory distress.      Breath sounds: Normal breath sounds.   Abdominal:      General: Abdomen is flat.      Palpations: Abdomen is soft.      Tenderness: There is no abdominal tenderness.   Musculoskeletal:      Comments: Bilateral AKA   Skin:     General: Skin is warm and dry.   Neurological:      General: No focal deficit present.      Mental Status: She is alert.             Significant Labs: All pertinent labs within the past 24 hours have been reviewed.  BMP:   Recent Labs   Lab 11/06/24 0448   *   *   K 4.3   CL 99   CO2 21*   BUN 32*   CREATININE 3.8*   CALCIUM 7.8*     CBC:   Recent Labs   Lab 11/05/24  0600 11/06/24 0448   WBC 15.53* 13.68*   HGB 7.8* 6.0*   HCT 24.4* 19.0*    310       Significant Imaging: I have reviewed all pertinent imaging results/findings within the past 24 hours.    Assessment/Plan:      * Cellulitis of left thigh  L AKA- necrotic amputation surgical  "site- developing eschar 9x15x0.2cm and blisters to periwound  Continue IV antibiotics   General surgery consulted for wound debridement. Recs: continue recommendations per Wound Care   - Aquacel AG daily to L AKA    ID recs:   Continue vancomycin/ piperacillin tazobactam  Given one dose of metronidazole.  Difficult to determine length of therapy at this time but related to treatment response. Unfortunately, the aspect of the ischemic lesions complicate assessment of recovery.  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  Patient is status post CABG as well as stents that have clotted in her lower extremities    Pain control-- continue current regimen as BP allows  Cautious with narcs secondary to bradypnea/ decreased respiratory rate    Pressure injury of sacral region, unstageable  Wound care    - Pressure injury prevention interventions   - Triad ointment BID to sacrum/buttocks      Morbid obesity with BMI of 50.0-59.9, adult  Body mass index is 47.9 kg/m². Morbid obesity complicates all aspects of disease management from diagnostic modalities to treatment.           S/P AKA (above knee amputation) bilateral  Fall precautions     Septic shock  This patient had shock. The type of shock is distributive due to sepsis. She was admitted to the intensive care unit 10/29.   Patient did not require IVF resuscitation or pressors. BP recovered.  Stepped down 10/31.   Continue IV abx. For 14 days total  Can switch to po Augmentin and doxycyline on discharge       Chronic combined systolic and diastolic congestive heart failure  Patient has Combined Systolic and Diastolic heart failure that is Chronic. On presentation their CHF was well compensated. Most recent BNP and echo results are listed below.  No results for input(s): "BNP" in the last 72 hours.    Latest ECHO  Results for orders placed during the hospital encounter of 05/07/24    Echo    Interpretation Summary    Left Ventricle: The left ventricle is " mildly dilated. Ventricular mass is normal. Normal wall thickness. Severe global hypokinesis present. There is severely reduced systolic function. Ejection fraction by visual approximation is 15%. There is diastolic dysfunction.    Right Ventricle: Severe right ventricular enlargement. Wall thickness is normal. Right ventricle wall motion has global hypokinesis. Systolic function is mildly reduced.    Left Atrium: Left atrium is severely dilated. There is an atrial septal aneurysm.    Right Atrium: Right atrium is dilated.    Aortic Valve: There is mild aortic regurgitation.    Mitral Valve: There is mild regurgitation.    Tricuspid Valve: There is annular dilation present. There is normal leaflet mobility. There is severe functional regurgitation.    Pulmonary Artery: The estimated pulmonary artery systolic pressure is at least 24 mmHg. PASP is likely under-estimated in the setting of severe tricuspid regurgitation.    IVC/SVC: Central venous pressure of at least 8 mmHg.    No vegetation seen.    Current Heart Failure Medications       Plan  - Monitor strict I&Os and daily weights.    - Place on telemetry  - Low sodium diet  - Place on fluid restriction of 1 L.   - Cardiology has not been consulted  - The patient's volume status is stable but not at their baseline as indicated by edema- UF with HD as tolerated. Lasix DC'd 10/30 due to low BP        Anemia due to chronic kidney disease, on chronic dialysis  Anemia is likely due to acute blood loss which was from bleeding from L stump wound . Most recent hemoglobin and hematocrit are listed below.  Recent Labs     11/04/24  0521 11/05/24  0600 11/06/24  0448   HGB 8.0* 7.8* 6.0*   HCT 25.8* 24.4* 19.0*       Plan  - Monitor serial CBC: BID  - Transfuse PRBC if patient becomes hemodynamically unstable, symptomatic or H/H drops below 7/21.  - Patient has received 3 units of PRBCs on 11/3  and one unit on 11/6  - Patient's anemia is currently worsening. Will consulted  GI and plan for EGD tomorrow  - acute on chronic anemia due to bleed from L stump wound and possible GI bleed    Peripheral vascular disease  On Eliquis/ plavix--- held due to bleed from the stump and now possible GI bleed      VTE Risk Mitigation (From admission, onward)           Ordered     IP VTE HIGH RISK PATIENT  Once         10/28/24 2114     Place sequential compression device  Until discontinued         10/28/24 2114     Reason for No Pharmacological VTE Prophylaxis  Once        Question:  Reasons:  Answer:  Already adequately anticoagulated on oral Anticoagulants    10/28/24 2114     heparin (porcine) injection 3,600 Units  As needed (PRN)         10/28/24 2114                    Discharge Planning   DEVAN: 11/8/2024     Code Status: Full Code   Is the patient medically ready for discharge?:     Reason for patient still in hospital (select all that apply): Patient trending condition  Discharge Plan A: Skilled Nursing Facility ([facility])                  Chivo Parra MD  Department of Hospital Medicine   [facility]

## 2024-11-06 NOTE — ASSESSMENT & PLAN NOTE
Body mass index is 47.9 kg/m². Morbid obesity complicates all aspects of disease management from diagnostic modalities to treatment.

## 2024-11-06 NOTE — SUBJECTIVE & OBJECTIVE
Interval History:     Patient is calm and cooperative  She was noted with Hgn drop this morning , the left thigh stump with no apparent bleeding  Patient is having loose dark green/black stool concerning for GI bleed, 1 U PRBC is ordered and GI consulted , EGD is scheduled for tomorrow    Review of Systems   Constitutional: Negative.    HENT: Negative.     Respiratory: Negative.     Cardiovascular: Negative.    Gastrointestinal: Negative.    Genitourinary: Negative.    Musculoskeletal:  Positive for myalgias.   Skin: Negative.    Neurological: Negative.    Psychiatric/Behavioral: Negative.       Objective:     Vital Signs (Most Recent):  Temp: 98.6 °F (37 °C) (11/06/24 1331)  Pulse: 84 (11/06/24 1331)  Resp: 18 (11/06/24 1331)  BP: (!) 115/56 (11/06/24 1331)  SpO2: 98 % (11/06/24 1331) Vital Signs (24h Range):  Temp:  [97.5 °F (36.4 °C)-98.7 °F (37.1 °C)] 98.6 °F (37 °C)  Pulse:  [] 84  Resp:  [12-20] 18  SpO2:  [96 %-100 %] 98 %  BP: (105-138)/(52-73) 115/56     Weight: 71.2 kg (156 lb 15.5 oz)  Body mass index is 47.9 kg/m².    Intake/Output Summary (Last 24 hours) at 11/6/2024 1424  Last data filed at 11/6/2024 1255  Gross per 24 hour   Intake 360 ml   Output 0 ml   Net 360 ml         Physical Exam  Constitutional:       Appearance: Normal appearance.   Cardiovascular:      Rate and Rhythm: Normal rate and regular rhythm.   Pulmonary:      Effort: Pulmonary effort is normal. No respiratory distress.      Breath sounds: Normal breath sounds.   Abdominal:      General: Abdomen is flat.      Palpations: Abdomen is soft.      Tenderness: There is no abdominal tenderness.   Musculoskeletal:      Comments: Bilateral AKA   Skin:     General: Skin is warm and dry.   Neurological:      General: No focal deficit present.      Mental Status: She is alert.             Significant Labs: All pertinent labs within the past 24 hours have been reviewed.  BMP:   Recent Labs   Lab 11/06/24  0448   *   *   K 4.3    CL 99   CO2 21*   BUN 32*   CREATININE 3.8*   CALCIUM 7.8*     CBC:   Recent Labs   Lab 11/05/24  0600 11/06/24  0448   WBC 15.53* 13.68*   HGB 7.8* 6.0*   HCT 24.4* 19.0*    310       Significant Imaging: I have reviewed all pertinent imaging results/findings within the past 24 hours.

## 2024-11-06 NOTE — PROGRESS NOTES
Pharmacokinetic Assessment Follow Up: IV Vancomycin    Vancomycin serum concentration assessment(s):    The random level was drawn correctly and can be used to guide therapy at this time. The measurement is within the desired definitive target range of 15 to 20 mcg/mL.    Vancomycin Regimen Plan:  Vancomycin 500mg IV MWF  Re-dose when the random level is less than 25 mcg/mL, next level to be drawn at 0400 on 11/8    Drug levels (last 3 results):  Recent Labs   Lab Result Units 11/04/24  0521 11/05/24 0600 11/06/24  0448   Vancomycin, Random ug/mL 15.3 18.4 17.0       Pharmacy will continue to follow and monitor vancomycin.    Please contact pharmacy at extension [phone] for questions regarding this assessment.    Thank you for the consult,   Teofilo Mora       Patient brief summary:  Suyapa Connelly is a 59 y.o. female initiated on antimicrobial therapy with IV Vancomycin for treatment of skin & soft tissue infection    The patient's current regimen is     Drug Allergies:   Review of patient's allergies indicates:   Allergen Reactions    Ciprofloxacin Itching    Iodine      Kidney injury    Pcn [penicillins]      Rash; tolerated ceftriaxone on 1/13/20       Actual Body Weight:       Renal Function:   Estimated Creatinine Clearance: 12.2 mL/min (A) (based on SCr of 3.8 mg/dL (H)).,     Dialysis Method (if applicable):    CBC (last 72 hours):  Recent Labs   Lab Result Units 11/03/24  1432 11/04/24  0521 11/05/24 0600 11/06/24  0448   WBC K/uL 24.77* 15.20* 15.53* 13.68*   Hemoglobin g/dL 7.8* 8.0* 7.8* 6.0*   Hematocrit % 23.0* 25.8* 24.4* 19.0*   Platelets K/uL 284 290 343 310       Metabolic Panel (last 72 hours):  Recent Labs   Lab Result Units 11/04/24  0822 11/05/24 0600 11/06/24  0448   Sodium mmol/L 132* 140 130*   Potassium mmol/L 4.8 4.5 4.3   Chloride mmol/L 97 108 99   CO2 mmol/L 18* 19* 21*   Glucose mg/dL 168* 125* 188*   BUN mg/dL 48* 20 32*   Creatinine mg/dL 4.2* 2.8* 3.8*   Albumin g/dL 1.8* 1.9*  1.7*   Phosphorus mg/dL 2.8 2.9 3.0       Vancomycin Administrations:  vancomycin given in the last 96 hours                     vancomycin (VANCOCIN) 500 mg in D5W 100 mL IVPB (MB+) (mg) 500 mg New Bag 11/04/24 1522                    Microbiologic Results:  Microbiology Results (last 7 days)       Procedure Component Value Units Date/Time    Blood culture [[id number]] Collected: 10/29/24 1045    Order Status: Completed Specimen: Blood Updated: 11/03/24 1612     Blood Culture, Routine No growth after 5 days.    Narrative:      Draw sample # 2 from separate site    Blood culture [[id number]] Collected: 10/29/24 1045    Order Status: Completed Specimen: Blood Updated: 11/03/24 1612     Blood Culture, Routine No growth after 5 days.    Narrative:      Draw sample # 2 from separate site

## 2024-11-06 NOTE — ASSESSMENT & PLAN NOTE
Anemia is likely due to acute blood loss which was from bleeding from L stump wound . Most recent hemoglobin and hematocrit are listed below.  Recent Labs     11/04/24  0521 11/05/24  0600 11/06/24  0448   HGB 8.0* 7.8* 6.0*   HCT 25.8* 24.4* 19.0*       Plan  - Monitor serial CBC: BID  - Transfuse PRBC if patient becomes hemodynamically unstable, symptomatic or H/H drops below 7/21.  - Patient has received 3 units of PRBCs on 11/3  and one unit on 11/6  - Patient's anemia is currently worsening. Will consulted GI and plan for EGD tomorrow  - acute on chronic anemia due to bleed from L stump wound and possible GI bleed

## 2024-11-06 NOTE — PLAN OF CARE
11/06/24 1105   Rounds   Attendance Provider;Nurse ;Assigned nurse   Discharge Plan A Skilled Nursing Facility  ([facility])   Why the patient remains in the hospital Requires continued medical care   Transition of Care Barriers Transportation     1105  Patient resting quietly in bed when CM participated in SIBR with Dr Saba & nurse Mary. Patient stepdown from ICU on 11/5/2024, was admitted with left thigh cellulitis, & continues to be followed by pulm, gen surg, neph, ID, and PT/OT. Pt continues to be on contact isolation due to (+) covid 10/29/2024.    Order noted for the pt to receive 1U PRBC for H&H 6.0/19.0 this AM.     Pt scheduled to receive her HD session this afternoon.     1345  CM was informed by EDUARD Burton ([phone]) w/Leanna that a request was sent to [facility] for the pt's HD chair to be moved while the pt is at [facility]. CM awaiting an update from Veronica edwards/[facility].     1350  CM informed the pt's mother. Tea Connelly ([phone]), via phone of above & that Jen stated she will not be able to signed the pt's admission paperwork for [facility]. Tea stated she will sign the paperwork.       Will continue to follow.

## 2024-11-06 NOTE — PT/OT/SLP PROGRESS
Occupational Therapy  Visit Attempt    Patient Name:  Suyapa Connelly   MRN:  [id number]    Patient not seen today secondary to Therapist assessment, Blood transfusion  Patient noted with low H/H (6/19) this date; nsg currently administering 1U PRBCs.    11/6/2024

## 2024-11-06 NOTE — PHYSICIAN QUERY
Please clarify the integumentary diagnosis related to the documentation of the sacrum.   Pressure Injury/Decubitus Ulcer, Stage 2

## 2024-11-06 NOTE — ASSESSMENT & PLAN NOTE
"Patient has Combined Systolic and Diastolic heart failure that is Chronic. On presentation their CHF was well compensated. Most recent BNP and echo results are listed below.  No results for input(s): "BNP" in the last 72 hours.    Latest ECHO  Results for orders placed during the hospital encounter of 05/07/24    Echo    Interpretation Summary    Left Ventricle: The left ventricle is mildly dilated. Ventricular mass is normal. Normal wall thickness. Severe global hypokinesis present. There is severely reduced systolic function. Ejection fraction by visual approximation is 15%. There is diastolic dysfunction.    Right Ventricle: Severe right ventricular enlargement. Wall thickness is normal. Right ventricle wall motion has global hypokinesis. Systolic function is mildly reduced.    Left Atrium: Left atrium is severely dilated. There is an atrial septal aneurysm.    Right Atrium: Right atrium is dilated.    Aortic Valve: There is mild aortic regurgitation.    Mitral Valve: There is mild regurgitation.    Tricuspid Valve: There is annular dilation present. There is normal leaflet mobility. There is severe functional regurgitation.    Pulmonary Artery: The estimated pulmonary artery systolic pressure is at least 24 mmHg. PASP is likely under-estimated in the setting of severe tricuspid regurgitation.    IVC/SVC: Central venous pressure of at least 8 mmHg.    No vegetation seen.    Current Heart Failure Medications       Plan  - Monitor strict I&Os and daily weights.    - Place on telemetry  - Low sodium diet  - Place on fluid restriction of 1 L.   - Cardiology has not been consulted  - The patient's volume status is stable but not at their baseline as indicated by edema- UF with HD as tolerated. Lasix DC'd 10/30 due to low BP      "

## 2024-11-06 NOTE — PROGRESS NOTES
Nephrology Progress Note     Consult Requested By: Chivo Parra MD  Reason for Consult: ESRD    SUBJECTIVE:     Review of Systems   Constitutional:  Positive for malaise/fatigue. Negative for chills and fever.   HENT:  Negative for congestion and sore throat.    Eyes:  Negative for blurred vision, double vision and photophobia.   Respiratory:  Negative for cough and shortness of breath.    Cardiovascular:  Negative for chest pain, palpitations and leg swelling.   Gastrointestinal:  Negative for abdominal pain, diarrhea, nausea and vomiting.   Genitourinary:  Negative for dysuria and urgency.   Musculoskeletal:  Negative for joint pain and myalgias.   Skin:  Negative for itching and rash.   Neurological:  Positive for weakness. Negative for dizziness, sensory change and headaches.   Endo/Heme/Allergies:  Negative for polydipsia. Does not bruise/bleed easily.   Psychiatric/Behavioral:  Negative for depression.        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    Osteomyelitis     Osteomyelitis of left foot 04/30/2021    Ulcer of left foot     Vaginal delivery     x1          OBJECTIVE:     Vital Signs (Most Recent)  Vitals:    11/06/24 0444 11/06/24 0757 11/06/24 0835 11/06/24 1119   BP: (!) 105/55 (!) 115/53  134/60   BP Location: Right arm Right arm  Right arm   Patient Position: Lying Lying  Lying   Pulse: 82 82 81 82   Resp: 18 16 17 18   Temp: 97.7 °F (36.5 °C) 97.9 °F (36.6 °C)  98.1 °F (36.7 °C)   TempSrc: Oral Oral  Oral   SpO2: 99% 96% 96% 96%   Weight: 71.2 kg (156 lb 15.5 oz)      Height:             Date 11/06/24 0700 - 11/07/24 0659   Shift 9114-9676 7396-5216 4098-3469 24 Hour Total   INTAKE   P.O. 120   120   Shift Total(mL/kg)  120(1.7)   120(1.7)   OUTPUT   Shift Total(mL/kg)       Weight (kg) 71.2 71.2 71.2 71.2           Medications:   atorvastatin  80 mg Oral Daily    calcitRIOL  0.5 mcg Oral Daily    [START ON 11/8/2024] cinacalcet  30 mg Oral Every Mon, Wed, Fri    insulin glargine U-100  5 Units Subcutaneous QHS    piperacillin-tazobactam (Zosyn) IV (PEDS and ADULTS) (extended infusion is not appropriate)  4.5 g Intravenous Q12H    pregabalin  50 mg Oral Daily    sertraline  100 mg Oral Daily    sodium bicarbonate  650 mg Oral TID    vitamin renal formula (B-complex-vitamin c-folic acid)  1 capsule Oral Daily           Physical Exam  Vitals and nursing note reviewed.   Constitutional:       General: She is not in acute distress.     Appearance: She is not diaphoretic.      Comments: Somnolent   HENT:      Head: Normocephalic and atraumatic.      Mouth/Throat:      Pharynx: No oropharyngeal exudate.   Eyes:      General: No scleral icterus.     Conjunctiva/sclera: Conjunctivae normal.      Pupils: Pupils are equal, round, and reactive to light.   Cardiovascular:      Rate and Rhythm: Normal rate and regular rhythm.      Heart sounds: Normal heart sounds. No murmur heard.  Pulmonary:      Effort: Pulmonary effort is normal. No respiratory distress.      Breath sounds: Normal breath sounds.   Abdominal:      General: Bowel sounds are normal. There is no distension.      Palpations: Abdomen is soft.      Tenderness: There is no abdominal tenderness.   Musculoskeletal:         General: Normal range of motion.      Cervical back: Normal range of motion and neck supple.      Comments: J CVC  Bilat AKA   Skin:     General: Skin is warm and dry.      Findings: No erythema.   Neurological:      Mental Status: She is alert and oriented to person, place, and time.      Cranial Nerves: No cranial nerve deficit.      Motor: Weakness present.   Psychiatric:         Mood and Affect: Affect normal.         Cognition and Memory: Memory normal.          Judgment: Judgment normal.         Laboratory:  Recent Labs   Lab 11/02/24  2325 11/03/24  0553 11/03/24  1024 11/03/24  1432 11/04/24  0521 11/05/24  0600 11/06/24 0448   WBC 11.70 14.24* 15.75*   < > 15.20* 15.53* 13.68*   HGB 6.6* 8.2* 7.3*   < > 8.0* 7.8* 6.0*   HCT 20.7* 25.0* 21.5*   < > 25.8* 24.4* 19.0*    291 278   < > 290 343 310   MONO 13.3  1.6* 10.1  1.4* 10.6  1.7*  --   --   --   --    EOSINOPHIL 1.3 0.4 0.5  --   --   --   --     < > = values in this interval not displayed.     Recent Labs   Lab 11/04/24  0822 11/05/24 0600 11/06/24 0448   * 140 130*   K 4.8 4.5 4.3   CL 97 108 99   CO2 18* 19* 21*   BUN 48* 20 32*   CREATININE 4.2* 2.8* 3.8*   CALCIUM 7.8* 8.5* 7.8*   PHOS 2.8 2.9 3.0       Diagnostic Results:  X-Ray: Reviewed  US: Reviewed  Echo: Reviewed  ASSESSMENT/PLAN:     1. ESRD   -- [facility]Sat at [facility], Dr. Payne  -- MWF while here  -- HD  today      2. HTN  -- Controlled off meds    3. Anemia of chronic kidney disease treated with NAEEM  EPogen with each HD  Acute blood loss s/p transfusion drop again will need blood will give on HD   Recent Labs   Lab 11/04/24  0521 11/05/24 0600 11/06/24 0448   HGB 8.0* 7.8* 6.0*   HCT 25.8* 24.4* 19.0*    343 310       Iron  Lab Results   Component Value Date    IRON 94 06/22/2024    TIBC 265 06/22/2024    FERRITIN 757 (H) 06/22/2024       4. MBD - Start Calcitriol, continue Cinacalcet  Recent Labs   Lab 11/06/24  0448   CALCIUM 7.8*   PHOS 3.0     Recent Labs   Lab 11/01/24  0351 11/02/24  0534 11/03/24  0553   MG 1.9 2.1 1.8       Lab Results   Component Value Date    .8 (H) 10/28/2024    CALCIUM 7.8 (L) 11/06/2024    CAION 0.92 (L) 05/17/2023    PHOS 3.0 11/06/2024     Lab Results   Component Value Date    [id number] 9 (L) 10/28/2024     Acidosis - add Bicarb 650 TID  Lab Results   Component Value Date    CO2 21 (L) 11/06/2024       5. Hemodialysis Access - LIJ Permacath    6.  Nutrition/Hypoalbuminemia  Recent Labs   Lab 11/05/24  0600 11/06/24  0448   ALBUMIN 1.9* 1.7*     Nepro with meals TID. Renal vitamins daily      Thank you for the consult, will follow  With any question please call   Sima Barrientos MD    [facility]  RAUL Back MD,   MD ANDREA Melton MD E. V. Harmon, NP    [address]  [place], [id number]  [phone]  After hours [phone]

## 2024-11-07 ENCOUNTER — ANESTHESIA EVENT (OUTPATIENT)
Dept: ENDOSCOPY | Facility: HOSPITAL | Age: 59
End: 2024-11-07
Payer: MEDICARE

## 2024-11-07 ENCOUNTER — ANESTHESIA (OUTPATIENT)
Dept: ENDOSCOPY | Facility: HOSPITAL | Age: 59
End: 2024-11-07
Payer: MEDICARE

## 2024-11-07 LAB
ALBUMIN SERPL BCP-MCNC: 1.8 G/DL (ref 3.5–5.2)
ANION GAP SERPL CALC-SCNC: 12 MMOL/L (ref 8–16)
BASOPHILS # BLD AUTO: 0.1 K/UL (ref 0–0.2)
BASOPHILS NFR BLD: 0.8 % (ref 0–1.9)
BUN SERPL-MCNC: 17 MG/DL (ref 6–20)
CALCIUM SERPL-MCNC: 8.1 MG/DL (ref 8.7–10.5)
CHLORIDE SERPL-SCNC: 98 MMOL/L (ref 95–110)
CO2 SERPL-SCNC: 20 MMOL/L (ref 23–29)
CREAT SERPL-MCNC: 2.7 MG/DL (ref 0.5–1.4)
DIFFERENTIAL METHOD BLD: ABNORMAL
EOSINOPHIL # BLD AUTO: 0.3 K/UL (ref 0–0.5)
EOSINOPHIL NFR BLD: 2.5 % (ref 0–8)
ERYTHROCYTE [DISTWIDTH] IN BLOOD BY AUTOMATED COUNT: 19.2 % (ref 11.5–14.5)
ERYTHROCYTE [DISTWIDTH] IN BLOOD BY AUTOMATED COUNT: 19.7 % (ref 11.5–14.5)
EST. GFR  (NO RACE VARIABLE): 20 ML/MIN/1.73 M^2
GLUCOSE SERPL-MCNC: 260 MG/DL (ref 70–110)
HCT VFR BLD AUTO: 23.5 % (ref 37–48.5)
HCT VFR BLD AUTO: 26.4 % (ref 37–48.5)
HGB BLD-MCNC: 7.7 G/DL (ref 12–16)
HGB BLD-MCNC: 8.3 G/DL (ref 12–16)
IMM GRANULOCYTES # BLD AUTO: 0.13 K/UL (ref 0–0.04)
IMM GRANULOCYTES NFR BLD AUTO: 1 % (ref 0–0.5)
LYMPHOCYTES # BLD AUTO: 1 K/UL (ref 1–4.8)
LYMPHOCYTES NFR BLD: 8.2 % (ref 18–48)
MCH RBC QN AUTO: 30.9 PG (ref 27–31)
MCH RBC QN AUTO: 31.2 PG (ref 27–31)
MCHC RBC AUTO-ENTMCNC: 31.4 G/DL (ref 32–36)
MCHC RBC AUTO-ENTMCNC: 32.8 G/DL (ref 32–36)
MCV RBC AUTO: 95 FL (ref 82–98)
MCV RBC AUTO: 98 FL (ref 82–98)
MONOCYTES # BLD AUTO: 1.2 K/UL (ref 0.3–1)
MONOCYTES NFR BLD: 9.1 % (ref 4–15)
NEUTROPHILS # BLD AUTO: 9.9 K/UL (ref 1.8–7.7)
NEUTROPHILS NFR BLD: 78.4 % (ref 38–73)
NRBC BLD-RTO: 0 /100 WBC
PHOSPHATE SERPL-MCNC: 2.8 MG/DL (ref 2.7–4.5)
PLATELET # BLD AUTO: 283 K/UL (ref 150–450)
PLATELET # BLD AUTO: 319 K/UL (ref 150–450)
PMV BLD AUTO: 10.6 FL (ref 9.2–12.9)
PMV BLD AUTO: 11.1 FL (ref 9.2–12.9)
POCT GLUCOSE: 110 MG/DL (ref 70–110)
POCT GLUCOSE: 121 MG/DL (ref 70–110)
POCT GLUCOSE: 143 MG/DL (ref 70–110)
POCT GLUCOSE: 184 MG/DL (ref 70–110)
POCT GLUCOSE: 257 MG/DL (ref 70–110)
POTASSIUM SERPL-SCNC: 4.3 MMOL/L (ref 3.5–5.1)
RBC # BLD AUTO: 2.47 M/UL (ref 4–5.4)
RBC # BLD AUTO: 2.69 M/UL (ref 4–5.4)
SODIUM SERPL-SCNC: 130 MMOL/L (ref 136–145)
WBC # BLD AUTO: 12.67 K/UL (ref 3.9–12.7)
WBC # BLD AUTO: 14.28 K/UL (ref 3.9–12.7)

## 2024-11-07 PROCEDURE — 80069 RENAL FUNCTION PANEL: CPT | Mod: HCNC | Performed by: STUDENT IN AN ORGANIZED HEALTH CARE EDUCATION/TRAINING PROGRAM

## 2024-11-07 PROCEDURE — 37000008 HC ANESTHESIA 1ST 15 MINUTES: Mod: HCNC | Performed by: INTERNAL MEDICINE

## 2024-11-07 PROCEDURE — 11000001 HC ACUTE MED/SURG PRIVATE ROOM: Mod: HCNC

## 2024-11-07 PROCEDURE — 36415 COLL VENOUS BLD VENIPUNCTURE: CPT | Mod: HCNC | Performed by: STUDENT IN AN ORGANIZED HEALTH CARE EDUCATION/TRAINING PROGRAM

## 2024-11-07 PROCEDURE — 30200315 PPD INTRADERMAL TEST REV CODE 302: Mod: HCNC | Performed by: STUDENT IN AN ORGANIZED HEALTH CARE EDUCATION/TRAINING PROGRAM

## 2024-11-07 PROCEDURE — 25000003 PHARM REV CODE 250: Mod: HCNC | Performed by: NURSE ANESTHETIST, CERTIFIED REGISTERED

## 2024-11-07 PROCEDURE — 25000003 PHARM REV CODE 250: Mod: HCNC | Performed by: INTERNAL MEDICINE

## 2024-11-07 PROCEDURE — 86580 TB INTRADERMAL TEST: CPT | Mod: HCNC | Performed by: STUDENT IN AN ORGANIZED HEALTH CARE EDUCATION/TRAINING PROGRAM

## 2024-11-07 PROCEDURE — 85025 COMPLETE CBC W/AUTO DIFF WBC: CPT | Mod: HCNC | Performed by: STUDENT IN AN ORGANIZED HEALTH CARE EDUCATION/TRAINING PROGRAM

## 2024-11-07 PROCEDURE — 43235 EGD DIAGNOSTIC BRUSH WASH: CPT | Mod: HCNC,,, | Performed by: INTERNAL MEDICINE

## 2024-11-07 PROCEDURE — 25000003 PHARM REV CODE 250: Mod: HCNC

## 2024-11-07 PROCEDURE — 0DJ08ZZ INSPECTION OF UPPER INTESTINAL TRACT, VIA NATURAL OR ARTIFICIAL OPENING ENDOSCOPIC: ICD-10-PCS | Performed by: INTERNAL MEDICINE

## 2024-11-07 PROCEDURE — 43235 EGD DIAGNOSTIC BRUSH WASH: CPT | Mod: HCNC | Performed by: INTERNAL MEDICINE

## 2024-11-07 PROCEDURE — 25000003 PHARM REV CODE 250: Mod: HCNC | Performed by: STUDENT IN AN ORGANIZED HEALTH CARE EDUCATION/TRAINING PROGRAM

## 2024-11-07 PROCEDURE — 63600175 PHARM REV CODE 636 W HCPCS: Mod: HCNC | Performed by: INTERNAL MEDICINE

## 2024-11-07 PROCEDURE — 94761 N-INVAS EAR/PLS OXIMETRY MLT: CPT | Mod: HCNC

## 2024-11-07 PROCEDURE — 63600175 PHARM REV CODE 636 W HCPCS: Mod: HCNC | Performed by: NURSE ANESTHETIST, CERTIFIED REGISTERED

## 2024-11-07 PROCEDURE — 63600175 PHARM REV CODE 636 W HCPCS: Mod: HCNC | Performed by: STUDENT IN AN ORGANIZED HEALTH CARE EDUCATION/TRAINING PROGRAM

## 2024-11-07 PROCEDURE — 97530 THERAPEUTIC ACTIVITIES: CPT | Mod: HCNC

## 2024-11-07 PROCEDURE — 85027 COMPLETE CBC AUTOMATED: CPT | Mod: HCNC | Performed by: FAMILY MEDICINE

## 2024-11-07 PROCEDURE — 37000009 HC ANESTHESIA EA ADD 15 MINS: Mod: HCNC | Performed by: INTERNAL MEDICINE

## 2024-11-07 PROCEDURE — 99900035 HC TECH TIME PER 15 MIN (STAT): Mod: HCNC

## 2024-11-07 RX ORDER — ETOMIDATE 2 MG/ML
INJECTION INTRAVENOUS
Status: DISCONTINUED | OUTPATIENT
Start: 2024-11-07 | End: 2024-11-07

## 2024-11-07 RX ORDER — DEXMEDETOMIDINE HYDROCHLORIDE 100 UG/ML
INJECTION, SOLUTION INTRAVENOUS
Status: DISCONTINUED | OUTPATIENT
Start: 2024-11-07 | End: 2024-11-07

## 2024-11-07 RX ORDER — LIDOCAINE HYDROCHLORIDE 20 MG/ML
INJECTION, SOLUTION EPIDURAL; INFILTRATION; INTRACAUDAL; PERINEURAL
Status: DISCONTINUED | OUTPATIENT
Start: 2024-11-07 | End: 2024-11-07

## 2024-11-07 RX ORDER — PROPOFOL 10 MG/ML
VIAL (ML) INTRAVENOUS CONTINUOUS PRN
Status: DISCONTINUED | OUTPATIENT
Start: 2024-11-07 | End: 2024-11-07

## 2024-11-07 RX ORDER — PROPOFOL 10 MG/ML
VIAL (ML) INTRAVENOUS
Status: DISCONTINUED | OUTPATIENT
Start: 2024-11-07 | End: 2024-11-07

## 2024-11-07 RX ADMIN — ETOMIDATE 6 MG: 2 INJECTION, SOLUTION INTRAVENOUS at 03:11

## 2024-11-07 RX ADMIN — PIPERACILLIN SODIUM AND TAZOBACTAM SODIUM 4.5 G: 4; .5 INJECTION, POWDER, LYOPHILIZED, FOR SOLUTION INTRAVENOUS at 09:11

## 2024-11-07 RX ADMIN — SODIUM BICARBONATE 650 MG TABLET 650 MG: at 09:11

## 2024-11-07 RX ADMIN — CALCITRIOL CAPSULES 0.25 MCG 0.5 MCG: 0.25 CAPSULE ORAL at 09:11

## 2024-11-07 RX ADMIN — SERTRALINE HYDROCHLORIDE 100 MG: 100 TABLET ORAL at 09:11

## 2024-11-07 RX ADMIN — Medication 1 CAPSULE: at 09:11

## 2024-11-07 RX ADMIN — INSULIN ASPART 2 UNITS: 100 INJECTION, SOLUTION INTRAVENOUS; SUBCUTANEOUS at 09:11

## 2024-11-07 RX ADMIN — PREGABALIN 50 MG: 50 CAPSULE ORAL at 10:11

## 2024-11-07 RX ADMIN — PANTOPRAZOLE SODIUM 40 MG: 40 INJECTION, POWDER, LYOPHILIZED, FOR SOLUTION INTRAVENOUS at 09:11

## 2024-11-07 RX ADMIN — PIPERACILLIN SODIUM AND TAZOBACTAM SODIUM 4.5 G: 4; .5 INJECTION, POWDER, LYOPHILIZED, FOR SOLUTION INTRAVENOUS at 10:11

## 2024-11-07 RX ADMIN — ATORVASTATIN CALCIUM 80 MG: 40 TABLET, FILM COATED ORAL at 09:11

## 2024-11-07 RX ADMIN — INSULIN GLARGINE 5 UNITS: 100 INJECTION, SOLUTION SUBCUTANEOUS at 09:11

## 2024-11-07 RX ADMIN — LIDOCAINE HYDROCHLORIDE 50 MG: 20 INJECTION, SOLUTION EPIDURAL; INFILTRATION; INTRACAUDAL; PERINEURAL at 03:11

## 2024-11-07 RX ADMIN — GLYCOPYRROLATE 0.2 MG: 0.2 INJECTION, SOLUTION INTRAMUSCULAR; INTRAVITREAL at 03:11

## 2024-11-07 RX ADMIN — DEXMEDETOMIDINE HYDROCHLORIDE 8 MCG: 100 INJECTION, SOLUTION, CONCENTRATE INTRAVENOUS at 03:11

## 2024-11-07 RX ADMIN — TUBERCULIN PURIFIED PROTEIN DERIVATIVE 5 UNITS: 5 INJECTION INTRADERMAL at 05:11

## 2024-11-07 RX ADMIN — PROPOFOL 100 MCG/KG/MIN: 10 INJECTION, EMULSION INTRAVENOUS at 03:11

## 2024-11-07 RX ADMIN — PROPOFOL 30 MG: 10 INJECTION, EMULSION INTRAVENOUS at 03:11

## 2024-11-07 NOTE — ASSESSMENT & PLAN NOTE
Anemia is likely due to acute blood loss which was from bleeding from L stump wound . Most recent hemoglobin and hematocrit are listed below.  Recent Labs     11/06/24  0448 11/06/24  1949 11/07/24  0138   HGB 6.0* 7.0* 8.3*   HCT 19.0* 21.3* 26.4*       Plan  - Monitor serial CBC: BID  - Transfuse PRBC if patient becomes hemodynamically unstable, symptomatic or H/H drops below 7/21.  - Patient has received 3 units of PRBCs on 11/3  and one unit on 11/6  - Patient's anemia is currently worsening. Will consulted GI and plan for EGD today  - acute on chronic anemia due to bleed from L stump wound and possible GI bleed

## 2024-11-07 NOTE — TRANSFER OF CARE
Anesthesia Transfer of Care Note    Patient: Suyapa Connelly    Procedure(s) Performed: Procedure(s) (LRB):  EGD (ESOPHAGOGASTRODUODENOSCOPY) (N/A)    Patient location: GI    Anesthesia Type: general    Transport from OR: Transported from OR on 6-10 L/min O2 by face mask with adequate spontaneous ventilation    Post pain: adequate analgesia    Post assessment: no apparent anesthetic complications and tolerated procedure well    Post vital signs: stable    Level of consciousness: awake and alert    Nausea/Vomiting: no nausea/vomiting    Complications: none    Transfer of care protocol was followed      Last vitals: Visit Vitals  BP (!) 153/69   Pulse 87   Temp 36.5 °C (97.7 °F) (Temporal)   Resp 16   Ht 4' (1.219 m)   Wt 71.2 kg (156 lb 15.5 oz)   LMP 09/30/2015 (Exact Date)   SpO2 97%   BMI 47.90 kg/m²

## 2024-11-07 NOTE — PLAN OF CARE
Recommendations  Recommendation:   1. When medically able, resume diabetic/renal diet 1600 kcals   2. Continue Novasource Renal, consider BID   3. Monitor weight and labs   4. Encourage PO intake as tolerated    Goals: Pt will meet 75% of EEN/EPN by RD follow up    Nutrition Goal Status: new  Communication of RD Recs: other (comment) (POC)

## 2024-11-07 NOTE — PT/OT/SLP PROGRESS
Occupational Therapy   Treatment    Name: Suyapa Connelly  MRN: [id number]  Admitting Diagnosis:  Cellulitis of left thigh  Day of Surgery    Recommendations:     Discharge Recommendations: Moderate Intensity Therapy  Discharge Equipment Recommendations:  to be determined by next level of care  Barriers to discharge:  Decreased caregiver support, Other (Comment) (requires increased level of assist)    Assessment:     Suyapa Connelly is a 59 y.o. female with a medical diagnosis of Cellulitis of left thigh.  She presents with The primary encounter diagnosis was Acute encephalopathy. Diagnoses of Wound infection, Leg wound, left, Chest pain, Cellulitis of left thigh, Open thigh wound, left, initial encounter, ESRD (end stage renal disease) on dialysis [N18.6, Z99.2], and Acute blood loss anemia were also pertinent to this visit. Performance deficits affecting function are weakness, impaired functional mobility, gait instability, impaired endurance, impaired balance, impaired self care skills, decreased coordination, decreased upper extremity function, decreased lower extremity function, decreased ROM, impaired skin, pain, decreased safety awareness, impaired coordination.     Rehab Prognosis:  Good and Fair; patient would benefit from acute skilled OT services to address these deficits and reach maximum level of function.       Plan:     Patient to be seen 3 x/week to address the above listed problems via self-care/home management, therapeutic activities, therapeutic exercises  Plan of Care Expires:    Plan of Care Reviewed with: patient    Subjective     Chief Complaint: pain w/movement  Patient/Family Comments/goals: agreeable to sitting EOB  Pain/Comfort:  Pain Rating 1: 8/10  Location - Side 1: Left  Location - Orientation 1: distal  Location 1: leg  Pain Addressed 1: Reposition, Distraction, Cessation of Activity, Nurse notified  Pain Rating Post-Intervention 1: 8/10    Objective:     Communicated with: syeda rothman  "to session.  Patient found HOB elevated with bowel management system, peripheral IV, telemetry upon OT entry to room.    General Precautions: Standard, fall, airborne, droplet, contact    Orthopedic Precautions:N/A  Braces: N/A  Respiratory Status: Room air     Occupational Performance:     Bed Mobility:    Rolling Left:  maximal assistance  Rolling Right: maximal assistance  Scooting: maximal assistance and of 2 persons  Supine to Sit: maximal assistance and of 2 persons  Sit to Supine: maximal assistance and of 2 persons    Activities of Daily Living:  Pt declining ADLs EOB, stating, "I already did that today."      [facility] 6 Click ADL: 11    Treatment & Education:  Pt agreeable to therapy this date.  Transitioning to EOB with increased assist.  EOB sitting balance SBA for ~15 min.  Pt unable to perform BUE exs 2/2 using for support.  Pt requesting to return to supine 2/2 pain.  Pt rolling end of session to reposition pads.    Patient left HOB elevated with all lines intact, call button in reach, bed alarm on, and nsg notified    GOALS:   Multidisciplinary Problems       Occupational Therapy Goals          Problem: Occupational Therapy    Goal Priority Disciplines Outcome Interventions   Occupational Therapy Goal     OT, PT/OT Progressing    Description: Goals to be met by: 12/1/2024     Patient will increase functional independence with ADLs by performing:    UE Dressing with Set-up Assistance.  LE Dressing with Minimal Assistance.  Grooming while EOB with Set-up Assistance.  Sitting at edge of bed x10 minutes with Supervision.  Rolling to Bilateral with Modified [place].   Supine to sit with Minimal Assistance.                         Time Tracking:     OT Date of Treatment: 11/07/24  OT Start Time: 1409  OT Stop Time: 1436  OT Total Time (min): 27 min    Billable Minutes:Therapeutic Activity 27 cotx w/PT    OT/MACKENZIE: OT     Number of MACKENZIE visits since last OT visit: 0    11/7/2024  "

## 2024-11-07 NOTE — PLAN OF CARE
0800  CM informed [facility] and the [facility] Intake that the pt will need to have her HD chair transferred from [facility] to a center in [facility]. CM was informed that the only availability at centers in [facility] are with start times after 1500.     0820  CM was informed by Mariann ([phone]) w/[facility] that a start time at 1500 is fine but requested the [facility] location.        11/07/24 0945   Rounds   Attendance Provider;Nurse ;Pharmacist;Assigned nurse   Discharge Plan A Skilled Nursing Facility  ([facility])   Why the patient remains in the hospital Requires continued medical care   Transition of Care Barriers Transportation       0945  Patient resting quietly in bed when CM participated in SIBR with Dr Parra, pharmacist Km,  nurse Mary. No family present.     Pt was admitted with left thigh cellulitis, & continues to be followed by neph, ID, nut, and PT/OT. Pt scheduled to have an EGD done today. H&H 8.3/26.4 this AM.     1115  CM informed Janeth ([phone]) w/[facility]Intake of above. Janeth requested that documentation be faxed to (f [phone]).    1235  Required documentation, except PPD, manually faxed as requested. PPD ordered.     1300  Updated notes sent to [facility] via [facility].     1315  CM was informed by Abimbola w/[facility] of additional Hep labs results required.     1330  Additional Hep labs manually faxed to [facility] and the [facility]-Intake (f [phone]) as requested.       Will continue to follow.

## 2024-11-07 NOTE — ANESTHESIA PREPROCEDURE EVALUATION
11/07/2024  Suyapa Connelly is a 59 y.o., female.      Pre-op Assessment    I have reviewed the Patient Summary Reports.     I have reviewed the Nursing Notes. I have reviewed the NPO Status.      Review of Systems  Anesthesia Hx:               Denies Personal Hx of Anesthesia complications.                    Hematology/Oncology:       -- Anemia:                                  Cardiovascular:        CAD      Angina CHF            Cardiovascular Symptoms: Angina       Coronary Artery Disease:               Congestive Heart Failure (CHF)                  Atrial Fibrillation     Renal/:  Chronic Renal Disease        Kidney Function/Disease             Hepatic/GI:     GERD         Gerd          Neurological:    Neuromuscular Disease,                                 Neuromuscular Disease   Endocrine:  Diabetes    Diabetes                      Psych:  Psychiatric History              Physical Exam  General: Well nourished    Airway:  Mallampati: II   Mouth Opening: Normal  Neck ROM: Normal ROM    Anesthesia Plan  Type of Anesthesia, risks & benefits discussed:    Anesthesia Type: Gen Natural Airway  Informed Consent: Informed consent signed with the Patient and all parties understand the risks and agree with anesthesia plan.  All questions answered.   ASA Score: 2    Ready For Surgery From Anesthesia Perspective.   .

## 2024-11-07 NOTE — PLAN OF CARE
Pt procedure completed with no complications. V/S are stable at this time. Report called to Mary (floor nurse ).Awaiting transport

## 2024-11-07 NOTE — PROGRESS NOTES
Nephrology Progress Note     Consult Requested By: Chivo Parra MD  Reason for Consult: ESRD    SUBJECTIVE:     Review of Systems   Constitutional:  Positive for malaise/fatigue. Negative for chills and fever.   HENT:  Negative for congestion and sore throat.    Eyes:  Negative for blurred vision, double vision and photophobia.   Respiratory:  Negative for cough and shortness of breath.    Cardiovascular:  Negative for chest pain, palpitations and leg swelling.   Gastrointestinal:  Negative for abdominal pain, diarrhea, nausea and vomiting.   Genitourinary:  Negative for dysuria and urgency.   Musculoskeletal:  Negative for joint pain and myalgias.   Skin:  Negative for itching and rash.   Neurological:  Positive for weakness. Negative for dizziness, sensory change and headaches.   Endo/Heme/Allergies:  Negative for polydipsia. Does not bruise/bleed easily.   Psychiatric/Behavioral:  Negative for depression.        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    Osteomyelitis     Osteomyelitis of left foot 04/30/2021    Ulcer of left foot     Vaginal delivery     x1          OBJECTIVE:     Vital Signs (Most Recent)  Vitals:    11/07/24 0725 11/07/24 0748 11/07/24 0801 11/07/24 1133   BP:  (!) 143/65  137/65   BP Location:  Right arm     Patient Position:  Lying  Lying   Pulse: 86 88  86   Resp: 17 18  18   Temp:  98.3 °F (36.8 °C)  98.2 °F (36.8 °C)   TempSrc:  Oral  Oral   SpO2: 99% (!) 94%  99%   Weight:   71.2 kg (156 lb 15.5 oz)    Height:   4' (1.219 m)                    Medications:   atorvastatin  80 mg Oral Daily    calcitRIOL  0.5 mcg Oral Daily    [START ON 11/8/2024] cinacalcet  30 mg Oral Every Mon, Wed, Fri    insulin glargine  U-100  5 Units Subcutaneous QHS    pantoprazole  40 mg Intravenous Daily    piperacillin-tazobactam (Zosyn) IV (PEDS and ADULTS) (extended infusion is not appropriate)  4.5 g Intravenous Q12H    pregabalin  50 mg Oral Daily    sertraline  100 mg Oral Daily    sodium bicarbonate  650 mg Oral TID    tuberculin  5 Units Intradermal Once    vancomycin (VANCOCIN) IV (PEDS and ADULTS)  500 mg Intravenous Every Mon, Wed, Fri    vitamin renal formula (B-complex-vitamin c-folic acid)  1 capsule Oral Daily           Physical Exam  Vitals and nursing note reviewed.   Constitutional:       General: She is not in acute distress.     Appearance: She is not diaphoretic.   HENT:      Head: Normocephalic and atraumatic.      Mouth/Throat:      Pharynx: No oropharyngeal exudate.   Eyes:      General: No scleral icterus.     Conjunctiva/sclera: Conjunctivae normal.      Pupils: Pupils are equal, round, and reactive to light.   Cardiovascular:      Rate and Rhythm: Normal rate and regular rhythm.      Heart sounds: Normal heart sounds. No murmur heard.  Pulmonary:      Effort: Pulmonary effort is normal. No respiratory distress.      Breath sounds: Normal breath sounds.   Abdominal:      General: Bowel sounds are normal. There is no distension.      Palpations: Abdomen is soft.      Tenderness: There is no abdominal tenderness.   Musculoskeletal:         General: Normal range of motion.      Cervical back: Normal range of motion and neck supple.      Comments: [facility] CVC  Bilat AKA   Skin:     General: Skin is warm and dry.      Findings: No erythema.   Neurological:      Mental Status: She is alert and oriented to person, place, and time.      Cranial Nerves: No cranial nerve deficit.      Motor: Weakness present.   Psychiatric:         Mood and Affect: Affect normal.         Cognition and Memory: Memory normal.         Judgment: Judgment normal.         Laboratory:  Recent Labs   Lab 11/03/24  0553 11/03/24  1024 11/03/24  1432  11/06/24 0448 11/06/24 1949 11/07/24 0138   WBC 14.24* 15.75*   < > 13.68* 18.98* 14.28*   HGB 8.2* 7.3*   < > 6.0* 7.0* 8.3*   HCT 25.0* 21.5*   < > 19.0* 21.3* 26.4*    278   < > 310 310 283   MONO 10.1  1.4* 10.6  1.7*  --   --  6.3  1.2*  --    EOSINOPHIL 0.4 0.5  --   --  1.2  --     < > = values in this interval not displayed.     Recent Labs   Lab 11/05/24  0600 11/06/24 0448 11/07/24 0138    130* 130*   K 4.5 4.3 4.3    99 98   CO2 19* 21* 20*   BUN 20 32* 17   CREATININE 2.8* 3.8* 2.7*   CALCIUM 8.5* 7.8* 8.1*   PHOS 2.9 3.0 2.8       Diagnostic Results:  X-Ray: Reviewed  US: Reviewed  Echo: Reviewed  ASSESSMENT/PLAN:     1. [facility]   [facility]  -- HD tomorrow    2. HTN  -- Controlled off meds    3. Anemia of chronic kidney disease treated with NAEEM  EPogen with each HD  Acute blood loss s/p transfusion given on HD  Pending EGD   Recent Labs   Lab 11/06/24 0448 11/06/24 1949 11/07/24 0138   HGB 6.0* 7.0* 8.3*   HCT 19.0* 21.3* 26.4*    310 283       Iron  Lab Results   Component Value Date    IRON 94 06/22/2024    TIBC 265 06/22/2024    FERRITIN 757 (H) 06/22/2024       4. MBD - Start Calcitriol, continue Cinacalcet  Recent Labs   Lab 11/07/24 0138   CALCIUM 8.1*   PHOS 2.8     Recent Labs   Lab 11/01/24  0351 11/02/24  0534 11/03/24  0553   MG 1.9 2.1 1.8       Lab Results   Component Value Date    .8 (H) 10/28/2024    CALCIUM 8.1 (L) 11/07/2024    CAION 0.92 (L) 05/17/2023    PHOS 2.8 11/07/2024     Lab Results   Component Value Date    [id number] 9 (L) 10/28/2024     Acidosis - add Bicarb 650 TID  Lab Results   Component Value Date    CO2 20 (L) 11/07/2024       5. Hemodialysis Access - LIJ Permacath    6. Nutrition/Hypoalbuminemia  Recent Labs   Lab 11/06/24  0448 11/07/24  0138   ALBUMIN 1.7* 1.8*     Nepro with meals TID. Renal vitamins daily      Thank you for the consult, will follow  With any question  please call   Katie Montana NP    [facility]  RAUL Back MD,   MD ANDREA Melton MD M. [place], NP    [address]  [place], [id number] [phone]  After hours [phone]

## 2024-11-07 NOTE — NURSING
Pt back on the unit from endoscopy. No c/o pain/ discomfort at this time. VS stable at this time.

## 2024-11-07 NOTE — CARE UPDATE
11/07/24 0048   Preset CPAP/BiPAP Settings   Mode Of Delivery BiPAP S/T   CPAP/BIPAP charged w/in last 24 h NO   $ Initial CPAP/BiPAP Setup? No   $ Is patient using? No/refused   Reason patient is not wearing? Patient refused

## 2024-11-07 NOTE — PROGRESS NOTES
[facility] - Telemetry  Adult Nutrition  Progress Note    SUMMARY     Recommendations  Recommendation:   1. When medically able, resume diabetic/renal diet 1600 kcals   2. Continue Novasource Renal, consider BID   3. Monitor weight and labs   4. Encourage PO intake as tolerated    Goals: Pt will meet 75% of EEN/EPN by RD follow up    Nutrition Goal Status: new  Communication of RD Recs: other (comment) (POC)    Assessment and Plan  Nutrition Problem  Inadequate energy intake     Related to (etiology):   Accessibility barriers     Signs and Symptoms (as evidenced by):   S/p BL AKA  Pt is unable to care for herself   Social problems     Interventions:  Collaboration by nutrition professionals with other providers   Commercial beverage medical food supplement therapy- Novasource   Renal Diet   Mineral Supplement therapy- Fe (iron)    Nutrition Diagnosis Status:   New    Malnutrition Assessment  Unable to assess at this time, pt on isolation precautions    Reason for Assessment  Reason For Assessment: length of stay  Diagnosis: other (see comments) (cellulitis of left thigh)  General Information Comments: Pt is NPO for EGD today. PMH-GERD, anxiety, depression, DM2, CKD3, osteomyelitis, ulcer of left foot. Carlos-14/moisture associated dermatitis left posterior buttocks, left posterior gluteal cleft, left medial groin, lower sacral spine, left distal posterior thigh.  Noted 75% meal intake prior to NPO status. Pt presented after being found on the floor by her neighbor.  who ususally cares for her is in [place] and pt is unable to care for herself. Pt admitted for social problems and HD. Noted left thigh stump with no apparent bleeding. Noted symptomatic anemia- possibly d/t blood loss. ESRD on HD. Noted 25 lb weight loss in 11 months. Unable to assess NFPE at this time, pt on isolation precautions.  Nutrition Discharge Planning: d/c on renal.diabetic diet    Nutrition Risk Screen  Nutrition Risk Screen: no indicators  present    Nutrition/Diet History  Patient Reported Diet/Restrictions/Preferences: diabetic diet, renal  Food Preferences: RJ  Spiritual, Cultural Beliefs, Confucianism Practices, Values that Affect Care: no    Food Insecurity: No Food Insecurity (10/29/2024)    Hunger Vital Sign     Worried About Running Out of Food in the Last Year: Never true     Ran Out of Food in the Last Year: Never true     Anthropometrics  Temp: 98.3 °F (36.8 °C)  Height Method: Stated  Height: 4' (121.9 cm)  Height (inches): 48 in  Weight Method: Standard Scale  Weight: 71.2 kg (156 lb 15.5 oz)  Weight (lb): 156.97 lb  Ideal Body Weight (IBW), Female: 40 lb  % Ideal Body Weight, Female (lb): 392.43 %  BMI (Calculated): 47.9  BMI Grade: greater than 40 - morbid obesity  Usual Body Weight (UBW), k.1 kg (12/15/23)  % Usual Body Weight: 86.9  % Weight Change From Usual Weight: -13.28 %     Lab/Procedures/Meds  Pertinent Labs Reviewed: reviewed  Pertinent Labs Comments: Na 130, CO2 20, Creatinine 2.7, Ca 8.1 GFR 20, Glucose 260, , Hgb 8.3, Hct 26.4  Pertinent Medications Reviewed: reviewed  Pertinent Medications Comments: calcitriol, atorvastatin, insulin, pantoprazole, pregabalin, vancomycin, renal vitamin formula    Estimated/Assessed Needs  Weight Used For Calorie Calculations: 71.2 kg (156 lb 15.5 oz)  Energy Calorie Requirements (kcal): 1534 kcals (PAL 1.5)  [facility] Need [facility]: [facility]  Protein Requirements: 99g (1.4g/kg)  Weight Used For Protein Calculations: 71.2 kg (156 lb 15.5 oz)     Estimated Fluid Requirement Method: RDA Method  RDA Method (mL): 1534  CHO Requirement: 190g    Nutrition Prescription Ordered  Current Diet Order: NPO (EGD)    Evaluation of Received Nutrient/Fluid Intake  I/O: 1355/2500  Energy Calories Required: not meeting needs  Protein Required: not meeting needs  Fluid Required: not meeting needs  Comments: LBM-24  Tolerance: not tolerating  % Intake of Estimated Energy Needs: 75 - 100 %  %  Meal Intake: 75 - 100 %    Nutrition Risk  Level of Risk/Frequency of Follow-up: low (1x/week)     Monitor and Evaluation  Food and Nutrient Intake: energy intake, food and beverage intake  Food and Nutrient Adminstration: diet order  Anthropometric Measurements: weight change, body mass index  Biochemical Data, Medical Tests and Procedures: electrolyte and renal panel, gastrointestinal profile, inflammatory profile, lipid profile, glucose/endocrine profile     Nutrition Follow-Up  RD Follow-up?: Yes

## 2024-11-07 NOTE — PROGRESS NOTES
11/06/24 2231   Handoff Report   Given To JONN Garcia RN   Vital Signs   Temp 97.5 °F (36.4 °C)   Temp Source Tympanic   Pulse 87   Heart Rate Source Monitor   Resp 20   Device (Oxygen Therapy) room air   BP (!) 150/97   BP Location Right arm   BP Method Automatic   Patient Position Lying        Hemodialysis Catheter left subclavian   No placement date or time found.   Present Prior to Hospital Arrival?: Yes  Location: left subclavian   Line Necessity Review CRRT/HD   Site Assessment No drainage;No redness;No swelling;No warmth   Line Securement Device Secured with sutureless device   Dressing Type CHG impregnated dressing/sponge;Central line dressing   Dressing Status Clean;Dry;Intact   Dressing Intervention Integrity maintained   Date on Dressing 11/04/24   Dressing Due to be Changed 11/11/24   Venous Patency/Care flushed w/o difficulty;heparin locked   Arterial Patency/Care flushed w/o difficulty;heparin locked   Post-Hemodialysis Assessment   Rinseback Volume (mL) 250 mL   Blood Volume Processed (Liters) 62 L   Dialyzer Clearance Moderately streaked   Duration of Treatment 210 minutes   Additional Fluid Intake (mL) 500 mL   Total UF (mL) 2500 mL   Net Fluid Removal 2000   Patient Response to Treatment tolerated well   Post-Hemodialysis Comments Lines dc'd. CVC flushed and locked per P and P. VSS. Pain persists, reported to primary RN. Reports new nausea. Also reported. No changes in status.     Transported back to floor by this RN.

## 2024-11-07 NOTE — [employer]OVATION PATIENT INSTRUCTIONS
Discharge Summary/Instructions after an Endoscopic Procedure  Patient Name: Suyapa Connelly  Patient MRN: [id number]  Patient YOB: 1965  Thursday, November 7, 2024  Yony Cancino MD  Dear patient,  As a result of recent federal legislation (The Federal Cures Act), you may   receive lab or pathology results from your procedure in your [facility]   account before your physician is able to contact you. Your physician or   their representative will relay the results to you with their   recommendations at their soonest availability.  Thank you,  Your health is very important to us during the Covid Crisis. Following your   procedure today, you will receive a daily text for 2 weeks asking about   signs or symptoms of Covid 19.  Please respond to this text when you   receive it so we can follow up and keep you as safe as possible.   RESTRICTIONS:  During your procedure today, you received medications for sedation.  These   medications may affect your judgment, balance and coordination.  Therefore,   for 24 hours, you have the following restrictions:   - DO NOT drive a car, operate machinery, make legal/financial decisions,   sign important papers or drink alcohol.    ACTIVITY:  Today: no heavy lifting, straining or running due to procedural   sedation/anesthesia.  The following day: return to full activity including work.  DIET:  Eat and drink normally unless instructed otherwise.     TREATMENT FOR COMMON SIDE EFFECTS:  - Mild abdominal pain, nausea, belching, bloating or excessive gas:  rest,   eat lightly and use a heating pad.  - Sore Throat: treat with throat lozenges and/or gargle with warm salt   water.  - Because air was used during the procedure, expelling large amounts of air   from your rectum or belching is normal.  - If a bowel prep was taken, you may not have a bowel movement for 1-3 days.    This is normal.  SYMPTOMS TO WATCH FOR AND REPORT TO YOUR PHYSICIAN:  1. Abdominal pain or bloating,  other than gas cramps.  2. Chest pain.  3. Back pain.  4. Signs of infection such as: chills or fever occurring within 24 hours   after the procedure.  5. Rectal bleeding, which would show as bright red, maroon, or black stools.   (A tablespoon of blood from the rectum is not serious, especially if   hemorrhoids are present.)  6. Vomiting.  7. Weakness or dizziness.  GO DIRECTLY TO THE NEAREST EMERGENCY ROOM IF YOU HAVE ANY OF THE FOLLOWING:      Difficulty breathing              Chills and/or fever over 101 F   Persistent vomiting and/or vomiting blood   Severe abdominal pain   Severe chest pain   Black, tarry stools   Bleeding- more than one tablespoon   Any other symptom or condition that you feel may need urgent attention  Your doctor recommends these additional instructions:  If any biopsies were taken, your doctors clinic will contact you in 1 to 2   weeks with any results.  - Return patient to hospital plata for ongoing care.   - Resume previous diet.   - Continue present medications.   - Outpatient colonoscopy to complete workup for anemia pending clinical   course  For questions, problems or results please call your physician - Yony Cancino MD.  EMERGENCY PHONE NUMBER: [phone],  LAB RESULTS: [phone]  IF A COMPLICATION OR EMERGENCY SITUATION ARISES AND YOU ARE UNABLE TO REACH   YOUR PHYSICIAN - GO DIRECTLY TO THE EMERGENCY ROOM.  Yony Cancino MD  11/7/2024 4:16:48 PM  This report has been verified and signed electronically.  Dear patient,  As a result of recent federal legislation (The Federal Cures Act), you may   receive lab or pathology results from your procedure in your [facility]   account before your physician is able to contact you. Your physician or   their representative will relay the results to you with their   recommendations at their soonest availability.  Thank you,  [employer]ATION

## 2024-11-07 NOTE — PROGRESS NOTES
[employer] ID note    Pt afebrile. WBC 19.  On vancomycin and piperacillin tazobactam.  Antibiotic day 10.  Patient to get EGD    No new microbiology    Continue present antibiotics for 14 days and reassess at that time  Monitor from a distance    Please call if any questions   Juarez Gamboa  [facility] ID  [phone]

## 2024-11-07 NOTE — PROGRESS NOTES
[facility] Medicine  Progress Note    Patient Name: Suyapa Connelly  MRN: [id number]  Patient Class: IP- Inpatient   Admission Date: 10/28/2024  Length of Stay: 10 days  Attending Physician: Chivo Parra MD  Primary Care Provider: Vanessa Noel MD        Subjective:     Principal Problem:Cellulitis of left thigh        HPI:  Patient is a 59-year-old female with a known past medical history of hypertension, hyperlipidemia, diabetes mellitus type 2, ESRD on HD, and deconditioning that presents the ER after being on floor throughout the evening.     Patient presents after being found on the floor by a neighbor today.  Patient reports that she got out of bed to change her pad and diaper but was unable to get back into bed, staying on the floor throughout the evening.  Patient's  who normally cares for her, was arrested yesterday and is in [place] and patient was unable to care for herself.  When patient was found by neighbor on floor, EMS was called and patient was brought to the ER for further evaluation.  On arrival to ER, patient in no acute distress.  Labs essentially within normal limits.  Patient last dialyzed on Thursday and no signs of fluid overload or electrolyte abnormalities.  Patient has no family NS able to assist her at home other than her  who is currently incarcerated.  Patient admitted for social problems and hemodialysis.    Patient due to her open wounds and CT finding of her left thigh - emphysema can not rule out infection -     Overview/Hospital Course:  No notes on file    Interval History:     Patient is calm and cooperative but she is tearful, saying that she is tired of everything going on with her and feels like she has to agree with everything we suggest without discussing with her, I tried to reassure her and explained that we can answer any question she has and we would never do anything if she doesn't agree on it. She asked if she could eat and I  explained that she is scheduled for EGD today so will need to wait till after that.    Review of Systems   Constitutional: Negative.    HENT: Negative.     Respiratory: Negative.     Cardiovascular: Negative.    Gastrointestinal: Negative.    Genitourinary: Negative.    Musculoskeletal: Negative.    Skin: Negative.    Neurological: Negative.    Psychiatric/Behavioral:  Positive for dysphoric mood.      Objective:     Vital Signs (Most Recent):  Temp: 98.2 °F (36.8 °C) (11/07/24 1133)  Pulse: 86 (11/07/24 1133)  Resp: 18 (11/07/24 1133)  BP: 137/65 (11/07/24 1133)  SpO2: 99 % (11/07/24 1133) Vital Signs (24h Range):  Temp:  [97.2 °F (36.2 °C)-98.5 °F (36.9 °C)] 98.2 °F (36.8 °C)  Pulse:  [] 86  Resp:  [17-20] 18  SpO2:  [94 %-99 %] 99 %  BP: (124-170)/(60-99) 137/65     Weight: 71.2 kg (156 lb 15.5 oz)  Body mass index is 47.9 kg/m².    Intake/Output Summary (Last 24 hours) at 11/7/2024 1450  Last data filed at 11/6/2024 2231  Gross per 24 hour   Intake 795 ml   Output 2500 ml   Net -1705 ml         Physical Exam  Constitutional:       Appearance: She is ill-appearing.   Cardiovascular:      Rate and Rhythm: Normal rate.   Pulmonary:      Effort: Pulmonary effort is normal. No respiratory distress.      Breath sounds: Normal breath sounds.   Abdominal:      General: Abdomen is flat.      Palpations: Abdomen is soft.   Skin:     General: Skin is warm and dry.   Neurological:      General: No focal deficit present.      Mental Status: She is alert. Mental status is at baseline.             Significant Labs: All pertinent labs within the past 24 hours have been reviewed.  CBC:   Recent Labs   Lab 11/06/24 0448 11/06/24 1949 11/07/24 0138   WBC 13.68* 18.98* 14.28*   HGB 6.0* 7.0* 8.3*   HCT 19.0* 21.3* 26.4*    310 283     CMP:   Recent Labs   Lab 11/06/24 0448 11/07/24 0138   * 130*   K 4.3 4.3   CL 99 98   CO2 21* 20*   * 260*   BUN 32* 17   CREATININE 3.8* 2.7*   CALCIUM 7.8* 8.1*    ALBUMIN 1.7* 1.8*   ANIONGAP 10 12       Significant Imaging: I have reviewed all pertinent imaging results/findings within the past 24 hours.    Assessment/Plan:      * Cellulitis of left thigh  L AKA- necrotic amputation surgical site- developing eschar 9x15x0.2cm and blisters to periwound  Continue IV antibiotics   General surgery consulted for wound debridement. Recs: continue recommendations per Wound Care   - Aquacel AG daily to L AKA    ID recs:   Continue vancomycin/ piperacillin tazobactam  Given one dose of metronidazole.  Difficult to determine length of therapy at this time but related to treatment response. Unfortunately, the aspect of the ischemic lesions complicate assessment of recovery.  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  Patient is status post CABG as well as stents that have clotted in her lower extremities    Pain control-- continue current regimen as BP allows  Cautious with narcs secondary to bradypnea/ decreased respiratory rate    Pressure injury of sacral region, unstageable  Wound care    - Pressure injury prevention interventions   - Triad ointment BID to sacrum/buttocks      Morbid obesity with BMI of 50.0-59.9, adult  Body mass index is 47.9 kg/m². Morbid obesity complicates all aspects of disease management from diagnostic modalities to treatment.           S/P AKA (above knee amputation) bilateral  Fall precautions     Septic shock  This patient had shock. The type of shock is distributive due to sepsis. She was admitted to the intensive care unit 10/29.   Patient did not require IVF resuscitation or pressors. BP recovered.  Stepped down 10/31.   Continue IV abx. For 14 days total  Can switch to po Augmentin and doxycyline on discharge       Chronic combined systolic and diastolic congestive heart failure  Patient has Combined Systolic and Diastolic heart failure that is Chronic. On presentation their CHF was well compensated. Most recent BNP and echo results  "are listed below.  No results for input(s): "BNP" in the last 72 hours.    Latest ECHO  Results for orders placed during the hospital encounter of 05/07/24    Echo    Interpretation Summary    Left Ventricle: The left ventricle is mildly dilated. Ventricular mass is normal. Normal wall thickness. Severe global hypokinesis present. There is severely reduced systolic function. Ejection fraction by visual approximation is 15%. There is diastolic dysfunction.    Right Ventricle: Severe right ventricular enlargement. Wall thickness is normal. Right ventricle wall motion has global hypokinesis. Systolic function is mildly reduced.    Left Atrium: Left atrium is severely dilated. There is an atrial septal aneurysm.    Right Atrium: Right atrium is dilated.    Aortic Valve: There is mild aortic regurgitation.    Mitral Valve: There is mild regurgitation.    Tricuspid Valve: There is annular dilation present. There is normal leaflet mobility. There is severe functional regurgitation.    Pulmonary Artery: The estimated pulmonary artery systolic pressure is at least 24 mmHg. PASP is likely under-estimated in the setting of severe tricuspid regurgitation.    IVC/SVC: Central venous pressure of at least 8 mmHg.    No vegetation seen.    Current Heart Failure Medications       Plan  - Monitor strict I&Os and daily weights.    - Place on telemetry  - Low sodium diet  - Place on fluid restriction of 1 L.   - Cardiology has not been consulted  - The patient's volume status is stable but not at their baseline as indicated by edema- UF with HD as tolerated. Lasix DC'd 10/30 due to low BP        Anemia due to chronic kidney disease, on chronic dialysis  Anemia is likely due to acute blood loss which was from bleeding from L stump wound . Most recent hemoglobin and hematocrit are listed below.  Recent Labs     11/06/24  0448 11/06/24  1949 11/07/24  0138   HGB 6.0* 7.0* 8.3*   HCT 19.0* 21.3* 26.4*       Plan  - Monitor serial CBC: " BID  - Transfuse PRBC if patient becomes hemodynamically unstable, symptomatic or H/H drops below 7/21.  - Patient has received 3 units of PRBCs on 11/3  and one unit on 11/6  - Patient's anemia is currently worsening. Will consulted GI and plan for EGD today  - acute on chronic anemia due to bleed from L stump wound and possible GI bleed    Peripheral vascular disease  On Eliquis/ plavix--- held due to bleed from the stump and now possible GI bleed pending EGD      VTE Risk Mitigation (From admission, onward)           Ordered     IP VTE HIGH RISK PATIENT  Once         10/28/24 2114     Place sequential compression device  Until discontinued         10/28/24 2114     Reason for No Pharmacological VTE Prophylaxis  Once        Question:  Reasons:  Answer:  Already adequately anticoagulated on oral Anticoagulants    10/28/24 2114     heparin (porcine) injection 3,600 Units  As needed (PRN)         10/28/24 2114                    Discharge Planning   DEVAN: 11/12/2024     Code Status: Full Code   Is the patient medically ready for discharge?:     Reason for patient still in hospital (select all that apply): Patient trending condition and Treatment  Discharge Plan A: Skilled Nursing Facility ([facility])                  Chivo Parra MD  Department of Hospital Medicine   [facility]

## 2024-11-07 NOTE — SUBJECTIVE & OBJECTIVE
Interval History:     Patient is calm and cooperative but she is tearful, saying that she is tired of everything going on with her and feels like she has to agree with everything we suggest without discussing with her, I tried to reassure her and explained that we can answer any question she has and we would never do anything if she doesn't agree on it. She asked if she could eat and I explained that she is scheduled for EGD today so will need to wait till after that.    Review of Systems   Constitutional: Negative.    HENT: Negative.     Respiratory: Negative.     Cardiovascular: Negative.    Gastrointestinal: Negative.    Genitourinary: Negative.    Musculoskeletal: Negative.    Skin: Negative.    Neurological: Negative.    Psychiatric/Behavioral:  Positive for dysphoric mood.      Objective:     Vital Signs (Most Recent):  Temp: 98.2 °F (36.8 °C) (11/07/24 1133)  Pulse: 86 (11/07/24 1133)  Resp: 18 (11/07/24 1133)  BP: 137/65 (11/07/24 1133)  SpO2: 99 % (11/07/24 1133) Vital Signs (24h Range):  Temp:  [97.2 °F (36.2 °C)-98.5 °F (36.9 °C)] 98.2 °F (36.8 °C)  Pulse:  [] 86  Resp:  [17-20] 18  SpO2:  [94 %-99 %] 99 %  BP: (124-170)/(60-99) 137/65     Weight: 71.2 kg (156 lb 15.5 oz)  Body mass index is 47.9 kg/m².    Intake/Output Summary (Last 24 hours) at 11/7/2024 1450  Last data filed at 11/6/2024 2231  Gross per 24 hour   Intake 795 ml   Output 2500 ml   Net -1705 ml         Physical Exam  Constitutional:       Appearance: She is ill-appearing.   Cardiovascular:      Rate and Rhythm: Normal rate.   Pulmonary:      Effort: Pulmonary effort is normal. No respiratory distress.      Breath sounds: Normal breath sounds.   Abdominal:      General: Abdomen is flat.      Palpations: Abdomen is soft.   Skin:     General: Skin is warm and dry.   Neurological:      General: No focal deficit present.      Mental Status: She is alert. Mental status is at baseline.             Significant Labs: All pertinent labs  within the past 24 hours have been reviewed.  CBC:   Recent Labs   Lab 11/06/24 0448 11/06/24 1949 11/07/24 0138   WBC 13.68* 18.98* 14.28*   HGB 6.0* 7.0* 8.3*   HCT 19.0* 21.3* 26.4*    310 283     CMP:   Recent Labs   Lab 11/06/24 0448 11/07/24 0138   * 130*   K 4.3 4.3   CL 99 98   CO2 21* 20*   * 260*   BUN 32* 17   CREATININE 3.8* 2.7*   CALCIUM 7.8* 8.1*   ALBUMIN 1.7* 1.8*   ANIONGAP 10 12       Significant Imaging: I have reviewed all pertinent imaging results/findings within the past 24 hours.

## 2024-11-07 NOTE — PT/OT/SLP PROGRESS
Physical Therapy Treatment    Patient Name:  Suyapa Connelly   MRN:  [id number]    Recommendations:     Discharge Recommendations: Moderate Intensity Therapy  Discharge Equipment Recommendations: to be determined by next level of care  Barriers to discharge: Inaccessible home, Decreased caregiver support, and fall risk    Assessment:     Suyapa Connelly is a 59 y.o. female admitted with a medical diagnosis of Cellulitis of left thigh.  She presents with the following impairments/functional limitations: weakness, impaired endurance, impaired self care skills, impaired functional mobility, gait instability, impaired balance, pain, decreased safety awareness, decreased lower extremity function, decreased upper extremity function, impaired skin, edema, impaired fine motor, impaired sensation, decreased coordination, impaired coordination, impaired cognition . Pt agreeable to therapy and able to tolerate sitting EOB this date.    Rehab Prognosis: Good; patient would benefit from acute skilled PT services to address these deficits and reach maximum level of function.    Recent Surgery: Procedure(s) (LRB):  EGD (ESOPHAGOGASTRODUODENOSCOPY) (N/A) Day of Surgery    Plan:     During this hospitalization, patient to be seen 3 x/week to address the identified rehab impairments via therapeutic activities, therapeutic exercises, neuromuscular re-education, wheelchair management/training and progress toward the following goals:    Plan of Care Expires:  12/01/24    Subjective     Chief Complaint: LLE pain  Patient/Family Comments/goals: reduce pain  Pain/Comfort:  Pain Rating 1: 8/10  Location - Side 1: Left  Location - Orientation 1: upper  Location 1: leg  Pain Addressed 1: Reposition, Distraction, Cessation of Activity, Nurse notified  Pain Rating Post-Intervention 1: 8/10      Objective:     Communicated with nsg prior to session.  Patient found HOB elevated with bowel management system, peripheral IV, telemetry upon PT entry  to room.     General Precautions: Standard, fall, airborne, droplet, contact  Orthopedic Precautions: N/A  Braces: N/A  Respiratory Status: Room air     Functional Mobility:  Bed Mobility:     Rolling Left:  maximal assistance  Rolling Right: maximal assistance  Scooting: maximal assistance and of 2 persons  Supine to Sit: maximal assistance and of 2 persons  Sit to Supine: maximal assistance and of 2 persons      AM-PAC 6 CLICK MOBILITY  Turning over in bed (including adjusting bedclothes, sheets and blankets)?: 2  Sitting down on and standing up from a chair with arms (e.g., wheelchair, bedside commode, etc.): 1  Moving from lying on back to sitting on the side of the bed?: 2  Moving to and from a bed to a chair (including a wheelchair)?: 1  Need to walk in hospital room?: 1  Climbing 3-5 steps with a railing?: 1  Basic Mobility Total Score: 8       Treatment & Education:  Pt agreeable to sit EOB; requires increased time to transition due to pain  MaxA x 2 to scoot forward EOB and SBA for sitting balance  Pt sat EOB 15 mins  Pt unable to tolerate much exercise due to pain  Pt returned supine and scooted to HOB with MaxA x 2  B rolling with maxA to reposition pt and bed sheets    Patient left HOB elevated with all lines intact, call button in reach, bed alarm on, and nsg notified..    GOALS:   Multidisciplinary Problems       Physical Therapy Goals          Problem: Physical Therapy    Goal Priority Disciplines Outcome Interventions   Physical Therapy Goal     PT, PT/OT Progressing    Description: Goals to be met by: 24     Patient will increase functional independence with mobility by performin. Supine to sit with Moderate Assistance  2. Sit to supine with Moderate Assistance  3. Rolling to Left and Right with Minimal Assistance.  4. Bed to chair transfer with Moderate Assistance using Slideboard  5. Wheelchair propulsion x 25 feet with Minimal Assistance using bilateral upper extremities                          Time Tracking:     PT Received On: 11/07/24  PT Start Time: 1409     PT Stop Time: 1436  PT Total Time (min): 27 min     Billable Minutes: Therapeutic Activity 27    Treatment Type: Treatment  PT/PTA: PT     Number of PTA visits since last PT visit: 0     11/07/2024

## 2024-11-08 PROBLEM — R65.21 SEPTIC SHOCK: Status: RESOLVED | Noted: 2022-01-17 | Resolved: 2024-11-08

## 2024-11-08 PROBLEM — A41.9 SEPTIC SHOCK: Status: RESOLVED | Noted: 2022-01-17 | Resolved: 2024-11-08

## 2024-11-08 LAB
ALBUMIN SERPL BCP-MCNC: 1.8 G/DL (ref 3.5–5.2)
ANION GAP SERPL CALC-SCNC: 13 MMOL/L (ref 8–16)
BUN SERPL-MCNC: 24 MG/DL (ref 6–20)
CALCIUM SERPL-MCNC: 8.5 MG/DL (ref 8.7–10.5)
CHLORIDE SERPL-SCNC: 95 MMOL/L (ref 95–110)
CO2 SERPL-SCNC: 22 MMOL/L (ref 23–29)
CREAT SERPL-MCNC: 3.8 MG/DL (ref 0.5–1.4)
ERYTHROCYTE [DISTWIDTH] IN BLOOD BY AUTOMATED COUNT: 19.2 % (ref 11.5–14.5)
EST. GFR  (NO RACE VARIABLE): 13 ML/MIN/1.73 M^2
GLUCOSE SERPL-MCNC: 128 MG/DL (ref 70–110)
HBV SURFACE AG SERPL QL IA: NORMAL
HCT VFR BLD AUTO: 25.2 % (ref 37–48.5)
HGB BLD-MCNC: 8.1 G/DL (ref 12–16)
MCH RBC QN AUTO: 30.8 PG (ref 27–31)
MCHC RBC AUTO-ENTMCNC: 32.1 G/DL (ref 32–36)
MCV RBC AUTO: 96 FL (ref 82–98)
PHOSPHATE SERPL-MCNC: 4.7 MG/DL (ref 2.7–4.5)
PLATELET # BLD AUTO: 397 K/UL (ref 150–450)
PMV BLD AUTO: 11 FL (ref 9.2–12.9)
POCT GLUCOSE: 146 MG/DL (ref 70–110)
POCT GLUCOSE: 192 MG/DL (ref 70–110)
POCT GLUCOSE: 212 MG/DL (ref 70–110)
POCT GLUCOSE: 264 MG/DL (ref 70–110)
POTASSIUM SERPL-SCNC: 4.5 MMOL/L (ref 3.5–5.1)
RBC # BLD AUTO: 2.63 M/UL (ref 4–5.4)
SODIUM SERPL-SCNC: 130 MMOL/L (ref 136–145)
VANCOMYCIN SERPL-MCNC: 17.9 UG/ML
WBC # BLD AUTO: 11.75 K/UL (ref 3.9–12.7)

## 2024-11-08 PROCEDURE — 80202 ASSAY OF VANCOMYCIN: CPT | Mod: HCNC | Performed by: STUDENT IN AN ORGANIZED HEALTH CARE EDUCATION/TRAINING PROGRAM

## 2024-11-08 PROCEDURE — 63600175 PHARM REV CODE 636 W HCPCS: Mod: HCNC | Performed by: INTERNAL MEDICINE

## 2024-11-08 PROCEDURE — 25000003 PHARM REV CODE 250: Mod: HCNC | Performed by: STUDENT IN AN ORGANIZED HEALTH CARE EDUCATION/TRAINING PROGRAM

## 2024-11-08 PROCEDURE — 97530 THERAPEUTIC ACTIVITIES: CPT | Mod: HCNC,CQ

## 2024-11-08 PROCEDURE — 97530 THERAPEUTIC ACTIVITIES: CPT | Mod: HCNC,CO

## 2024-11-08 PROCEDURE — 99900035 HC TECH TIME PER 15 MIN (STAT): Mod: HCNC

## 2024-11-08 PROCEDURE — 11000001 HC ACUTE MED/SURG PRIVATE ROOM: Mod: HCNC

## 2024-11-08 PROCEDURE — 87340 HEPATITIS B SURFACE AG IA: CPT | Mod: HCNC | Performed by: STUDENT IN AN ORGANIZED HEALTH CARE EDUCATION/TRAINING PROGRAM

## 2024-11-08 PROCEDURE — 36415 COLL VENOUS BLD VENIPUNCTURE: CPT | Mod: HCNC | Performed by: STUDENT IN AN ORGANIZED HEALTH CARE EDUCATION/TRAINING PROGRAM

## 2024-11-08 PROCEDURE — 85027 COMPLETE CBC AUTOMATED: CPT | Mod: HCNC | Performed by: FAMILY MEDICINE

## 2024-11-08 PROCEDURE — 25000003 PHARM REV CODE 250: Mod: HCNC

## 2024-11-08 PROCEDURE — 80069 RENAL FUNCTION PANEL: CPT | Mod: HCNC | Performed by: STUDENT IN AN ORGANIZED HEALTH CARE EDUCATION/TRAINING PROGRAM

## 2024-11-08 PROCEDURE — 97110 THERAPEUTIC EXERCISES: CPT | Mod: HCNC,CO

## 2024-11-08 PROCEDURE — 80100016 HC MAINTENANCE HEMODIALYSIS: Mod: HCNC

## 2024-11-08 PROCEDURE — 63600175 PHARM REV CODE 636 W HCPCS: Mod: HCNC | Performed by: STUDENT IN AN ORGANIZED HEALTH CARE EDUCATION/TRAINING PROGRAM

## 2024-11-08 PROCEDURE — 25000003 PHARM REV CODE 250: Mod: HCNC | Performed by: INTERNAL MEDICINE

## 2024-11-08 PROCEDURE — 94761 N-INVAS EAR/PLS OXIMETRY MLT: CPT | Mod: HCNC

## 2024-11-08 RX ORDER — PANTOPRAZOLE SODIUM 40 MG/1
40 TABLET, DELAYED RELEASE ORAL DAILY
Status: DISCONTINUED | OUTPATIENT
Start: 2024-11-09 | End: 2024-11-12 | Stop reason: HOSPADM

## 2024-11-08 RX ADMIN — ATORVASTATIN CALCIUM 80 MG: 40 TABLET, FILM COATED ORAL at 09:11

## 2024-11-08 RX ADMIN — SERTRALINE HYDROCHLORIDE 100 MG: 100 TABLET ORAL at 09:11

## 2024-11-08 RX ADMIN — PREGABALIN 50 MG: 50 CAPSULE ORAL at 09:11

## 2024-11-08 RX ADMIN — INSULIN ASPART 6 UNITS: 100 INJECTION, SOLUTION INTRAVENOUS; SUBCUTANEOUS at 05:11

## 2024-11-08 RX ADMIN — INSULIN GLARGINE 5 UNITS: 100 INJECTION, SOLUTION SUBCUTANEOUS at 09:11

## 2024-11-08 RX ADMIN — CINACALCET HYDROCHLORIDE 30 MG: 30 TABLET, FILM COATED ORAL at 09:11

## 2024-11-08 RX ADMIN — OXYCODONE HYDROCHLORIDE 10 MG: 10 TABLET, FILM COATED, EXTENDED RELEASE ORAL at 04:11

## 2024-11-08 RX ADMIN — CALCITRIOL CAPSULES 0.25 MCG 0.5 MCG: 0.25 CAPSULE ORAL at 09:11

## 2024-11-08 RX ADMIN — SODIUM BICARBONATE 650 MG TABLET 650 MG: at 09:11

## 2024-11-08 RX ADMIN — Medication 1 CAPSULE: at 09:11

## 2024-11-08 RX ADMIN — INSULIN ASPART 2 UNITS: 100 INJECTION, SOLUTION INTRAVENOUS; SUBCUTANEOUS at 09:11

## 2024-11-08 RX ADMIN — ONDANSETRON 4 MG: 2 INJECTION INTRAMUSCULAR; INTRAVENOUS at 04:11

## 2024-11-08 RX ADMIN — PANTOPRAZOLE SODIUM 40 MG: 40 INJECTION, POWDER, LYOPHILIZED, FOR SOLUTION INTRAVENOUS at 09:11

## 2024-11-08 NOTE — PLAN OF CARE
0753  Required documentation, including PPD placement, manually faxed to (f [phone]) as requested. Awaiting response.        11/08/24 0940   Rounds   Attendance Provider;Nurse    Discharge Plan A Skilled Nursing Facility  ([facility])   Transition of Care Barriers Transportation       0940  Patient resting quietly in bed when CRYSTAL participated in SIBR with Dr Parra & nurse Mary. No family present. Pt was admitted with left thigh cellulitis, & continues to be followed by neph, ID, nut, and PT/OT. CM was informed by MD that the pt is medically stable to discharge to SNF.     CM informed Mariann ([phone]) w/[facility] of above. Mariann stated she will submit for ins auth & contact the pt's family to sign admission paperwork. CM informed MD of above & that the pt will be admitted on Monday 11/11/2024.     1100  CM received a call from the pt's cousin, Jen Arias ([phone]), questioning if the pt can sign her own admission paperwork.     1340  CM informed Dr Parra of [facility] questioning Hep B results due to Hep B Total Core Antibody & HBsAG non-reactive & Hep B Surface Ag reactive. MD to reorder test to verify. Message sent to [facility] informing of above.       Will continue to follow.

## 2024-11-08 NOTE — PROGRESS NOTES
Pharmacokinetic Assessment Follow Up: IV Vancomycin    Vancomycin serum concentration assessment(s):    The random level was drawn correctly and can be used to guide therapy at this time. The measurement is within the desired definitive target range of 15 to 20 mcg/mL.    Vancomycin Regimen Plan:    Continue regimen to Vancomycin 500mg mg IV every MWF hours with next serum trough concentration measured at 0400 prior to   dose on      Drug levels (last 3 results):  Recent Labs   Lab Result Units 11/06/24 0448 11/08/24  0703   Vancomycin, Random ug/mL 17.0 17.9       Pharmacy will continue to follow and monitor vancomycin.    Please contact pharmacy at extension [phone] for questions regarding this assessment.    Thank you for the consult,   Teofilo Mora       Patient brief summary:  Suyapa Connelly is a 59 y.o. female initiated on antimicrobial therapy with IV Vancomycin for treatment of skin & soft tissue infection    The patient's current regimen is dose by level    Drug Allergies:   Review of patient's allergies indicates:   Allergen Reactions    Ciprofloxacin Itching    Iodine      Kidney injury    Pcn [penicillins]      Rash; tolerated ceftriaxone on 1/13/20       Actual Body Weight:       Renal Function:   Estimated Creatinine Clearance: 12.2 mL/min (A) (based on SCr of 3.8 mg/dL (H)).,     Dialysis Method (if applicable):  intermittent HD    CBC (last 72 hours):  Recent Labs   Lab Result Units 11/06/24  0448 11/06/24  1949 11/07/24  0138 11/07/24  1732 11/08/24  0703   WBC K/uL 13.68* 18.98* 14.28* 12.67 11.75   Hemoglobin g/dL 6.0* 7.0* 8.3* 7.7* 8.1*   Hematocrit % 19.0* 21.3* 26.4* 23.5* 25.2*   Platelets K/uL 310 310 283 319 397   Gran % %  --  86.1*  --  78.4*  --    Lymph % %  --  4.8*  --  8.2*  --    Mono % %  --  6.3  --  9.1  --    Eosinophil % %  --  1.2  --  2.5  --    Basophil % %  --  0.6  --  0.8  --    Differential Method   --  Automated  --  Automated  --        Metabolic Panel (last 72  hours):  Recent Labs   Lab Result Units 11/06/24  0448 11/07/24  0138 11/08/24  0703   Sodium mmol/L 130* 130* 130*   Potassium mmol/L 4.3 4.3 4.5   Chloride mmol/L 99 98 95   CO2 mmol/L 21* 20* 22*   Glucose mg/dL 188* 260* 128*   BUN mg/dL 32* 17 24*   Creatinine mg/dL 3.8* 2.7* 3.8*   Albumin g/dL 1.7* 1.8* 1.8*   Phosphorus mg/dL 3.0 2.8 4.7*       Vancomycin Administrations:  vancomycin given in the last 96 hours                     vancomycin (VANCOCIN) 500 mg in D5W 100 mL IVPB (MB+) (mg) 500 mg New Bag 11/06/24 1734    vancomycin (VANCOCIN) 500 mg in D5W 100 mL IVPB (MB+) (mg) 500 mg New Bag 11/04/24 1522                    Microbiologic Results:  Microbiology Results (last 7 days)       Procedure Component Value Units Date/Time    Blood culture [[id number]] Collected: 10/29/24 1045    Order Status: Completed Specimen: Blood Updated: 11/03/24 1612     Blood Culture, Routine No growth after 5 days.    Narrative:      Draw sample # 2 from separate site    Blood culture [[id number]] Collected: 10/29/24 1045    Order Status: Completed Specimen: Blood Updated: 11/03/24 1612     Blood Culture, Routine No growth after 5 days.    Narrative:      Draw sample # 2 from separate site

## 2024-11-08 NOTE — NURSING
Notified MD (Chivo Hernández): They were just able to draw her labs from earlier, her new hgb was 7.7. Her stool looks like a dark brown now more than black. She is very sleepy post the EGD. Can you add an order for o2? She is sating 88-91% on RA, but drops a bit when she is sleeping.     Orders for O2 placed at this time.

## 2024-11-08 NOTE — PLAN OF CARE
Problem: Occupational Therapy  Goal: Occupational Therapy Goal  Description: Goals to be met by: 12/1/2024     Patient will increase functional independence with ADLs by performing:    UE Dressing with Set-up Assistance.  LE Dressing with Minimal Assistance.  Grooming while EOB with Set-up Assistance.  Sitting at edge of bed x10 minutes with Supervision.  Rolling to Bilateral with Modified [place].   Supine to sit with Minimal Assistance.    Outcome: Progressing   Suyapa Connelly is a 59 y.o. female with a medical diagnosis of Cellulitis of left thigh.  Performance deficits affecting function are weakness, impaired endurance, impaired self care skills, impaired functional mobility, impaired balance, decreased upper extremity function, decreased lower extremity function, decreased safety awareness, pain, decreased ROM, impaired coordination, impaired skin, edema.    Pt found in bed, agreeable to therapy.  Pt oriented but confused.  Pt requires Max-Tot A with bed mobility and EOB sitting.  Pt with poor tolerance of therapy 2/2 pain.  Continue OT services to address functional goals, progressing as able.

## 2024-11-08 NOTE — PLAN OF CARE
Problem: Physical Therapy  Goal: Physical Therapy Goal  Description: Goals to be met by: 24     Patient will increase functional independence with mobility by performin. Supine to sit with Moderate Assistance  2. Sit to supine with Moderate Assistance  3. Rolling to Left and Right with Minimal Assistance.  4. Bed to chair transfer with Moderate Assistance using Slideboard  5. Wheelchair propulsion x 25 feet with Minimal Assistance using bilateral upper extremities    Outcome: Not Progressing

## 2024-11-08 NOTE — ASSESSMENT & PLAN NOTE
L AKA- necrotic amputation surgical site- developing eschar 9x15x0.2cm and blisters to periwound  Continue IV antibiotics   General surgery consulted for wound debridement. Recs: continue recommendations per Wound Care   - Aquacel AG daily to L AKA    ID recs:   Continue vancomycin/ piperacillin tazobactam, can be switched to Augmentin and doxy on discharge for total of 14 days  Given one dose of metronidazole.  Difficult to determine length of therapy at this time but related to treatment response. Unfortunately, the aspect of the ischemic lesions complicate assessment of recovery.  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  Patient is status post CABG as well as stents that have clotted in her lower extremities    Pain control-- continue current regimen as BP allows  Cautious with narcs secondary to bradypnea/ decreased respiratory rate

## 2024-11-08 NOTE — PROGRESS NOTES
Pharmacist Intervention IV to PO Note    Suyapa Connelly is a 59 y.o. female being treated with IV medication pantoprazole    Patient Data:    Vital Signs (Most Recent):  Temp: 98.6 °F (37 °C) (11/08/24 1303)  Pulse: 80 (11/08/24 1303)  Resp: 18 (11/08/24 1303)  BP: 111/77 (11/08/24 1303)  SpO2: 96 % (11/08/24 0830) Vital Signs (72h Range):  Temp:  [97.2 °F (36.2 °C)-99 °F (37.2 °C)]   Pulse:  []   Resp:  [16-21]   BP: (102-181)/()   SpO2:  [93 %-100 %]      CBC:  Recent Labs   Lab 11/07/24  0138 11/07/24  1732 11/08/24  0703   WBC 14.28* 12.67 11.75   RBC 2.69* 2.47* 2.63*   HGB 8.3* 7.7* 8.1*   HCT 26.4* 23.5* 25.2*    319 397   MCV 98 95 96   MCH 30.9 31.2* 30.8   MCHC 31.4* 32.8 32.1     CMP:     Recent Labs   Lab 11/02/24  0534 11/03/24  0553 11/06/24  0448 11/07/24  0138 11/08/24  0703      < > 188* 260* 128*   CALCIUM 8.6*   < > 7.8* 8.1* 8.5*   ALBUMIN 1.7*   < > 1.7* 1.8* 1.8*   PROT 7.8  --   --   --   --    *   < > 130* 130* 130*   K 4.1   < > 4.3 4.3 4.5   CO2 25   < > 21* 20* 22*   CL 92*   < > 99 98 95   BUN 29*   < > 32* 17 24*   CREATININE 3.6*   < > 3.8* 2.7* 3.8*   ALKPHOS 636*  --   --   --   --    ALT 32  --   --   --   --    AST 77*  --   --   --   --    BILITOT 0.7  --   --   --   --     < > = values in this interval not displayed.       Dietary Orders:  Diet Orders            Diet diabetic Renal; 2000 Calories (up to 75 gm per meal); Fluid - 1000mL; Standard Tray: Diabetic starting at 11/07 1704    Dietary nutrition supplements By Mouth; TID; Novasource Renal - Vanilla starting at 10/28 2115            Based on the following criteria, this patient qualifies for intravenous to oral conversion:  [x] The patients gastrointestinal tract is functioning (tolerating medications via oral or enteral route for 24 hours and tolerating food or enteral feeds for 24 hours).  [x] The patient is hemodynamically stable for 24 hours (heart rate <100 beats per minute, systolic  blood pressure >99 mm Hg, and respiratory rate <20 breaths per minute).  [x] The patient shows clinical improvement (afebrile for at least 24 hours and white blood cell count downtrending or normalized). Additionally, the patient must be non-neutropenic (absolute neutrophil count >500 cells/mm3).  [x] For antimicrobials, the patient has received IV therapy for at least 24 hours.    IV medication pantoprazole will be changed to oral medication     Pharmacist's Name: Teofilo Mora  Pharmacist's Extension: [phone]

## 2024-11-08 NOTE — ANESTHESIA POSTPROCEDURE EVALUATION
Anesthesia Post Evaluation    Patient: Suyapa Connelly    Procedure(s) Performed: Procedure(s) (LRB):  EGD (ESOPHAGOGASTRODUODENOSCOPY) (N/A)    Final Anesthesia Type: general      Patient location during evaluation: GI PACU  Patient participation: Yes- Able to Participate  Level of consciousness: awake and alert  Post-procedure vital signs: reviewed and stable  Pain management: adequate  Airway patency: patent    PONV status at discharge: No PONV  Anesthetic complications: no      Cardiovascular status: blood pressure returned to baseline and hemodynamically stable  Respiratory status: unassisted  Hydration status: euvolemic  Follow-up not needed.              Vitals Value Taken Time   /56 11/07/24 1722   Temp 36.6 °C (97.9 °F) 11/07/24 1722   Pulse 92 11/07/24 1722   Resp 18 11/07/24 1722   SpO2 93 % 11/07/24 1722         Event Time   Out of Recovery 11/07/2024 16:46:21         Pain/Shannan Score: Pain Rating Prior to Med Admin: 8 (11/6/2024 10:24 PM)

## 2024-11-08 NOTE — PROGRESS NOTES
[facility] Medicine  Progress Note    Patient Name: Suyapa Connelly  MRN: [id number]  Patient Class: IP- Inpatient   Admission Date: 10/28/2024  Length of Stay: 11 days  Attending Physician: Chivo Parra MD  Primary Care Provider: Vanessa Noel MD        Subjective:     Principal Problem:Cellulitis of left thigh        HPI:  Patient is a 59-year-old female with a known past medical history of hypertension, hyperlipidemia, diabetes mellitus type 2, ESRD on HD, and deconditioning that presents the ER after being on floor throughout the evening.     Patient presents after being found on the floor by a neighbor today.  Patient reports that she got out of bed to change her pad and diaper but was unable to get back into bed, staying on the floor throughout the evening.  Patient's  who normally cares for her, was arrested yesterday and is in [place] and patient was unable to care for herself.  When patient was found by neighbor on floor, EMS was called and patient was brought to the ER for further evaluation.  On arrival to ER, patient in no acute distress.  Labs essentially within normal limits.  Patient last dialyzed on Thursday and no signs of fluid overload or electrolyte abnormalities.  Patient has no family NS able to assist her at home other than her  who is currently incarcerated.  Patient admitted for social problems and hemodialysis.    Patient due to her open wounds and CT finding of her left thigh - emphysema can not rule out infection -     Overview/Hospital Course:  No notes on file    Interval History:     Patient is feeling okay getting dialysis in the room  Diarrhea has almost resolved, she has small amount of pasty stool, we will remove rectal tube  Patient is medically stable for discharge pending insurance Auth and HD chair    Review of Systems   Constitutional: Negative.    HENT: Negative.     Respiratory: Negative.     Cardiovascular: Negative.     Gastrointestinal: Negative.    Genitourinary: Negative.    Musculoskeletal: Negative.    Skin: Negative.    Neurological: Negative.    Psychiatric/Behavioral:  Positive for dysphoric mood.      Objective:     Vital Signs (Most Recent):  Temp: 98 °F (36.7 °C) (11/08/24 0929)  Pulse: 91 (11/08/24 1002)  Resp: 18 (11/08/24 0929)  BP: (!) 162/86 (11/08/24 1002)  SpO2: 96 % (11/08/24 0830) Vital Signs (24h Range):  Temp:  [97.6 °F (36.4 °C)-99 °F (37.2 °C)] 98 °F (36.7 °C)  Pulse:  [82-95] 91  Resp:  [16-21] 18  SpO2:  [93 %-99 %] 96 %  BP: (102-162)/(47-86) 162/86     Weight: 71.2 kg (156 lb 15.5 oz)  Body mass index is 47.9 kg/m².  No intake or output data in the 24 hours ending 11/08/24 1120        Physical Exam  Constitutional:       Appearance: She is ill-appearing.   Cardiovascular:      Rate and Rhythm: Normal rate.   Pulmonary:      Effort: Pulmonary effort is normal. No respiratory distress.      Breath sounds: Normal breath sounds.   Abdominal:      General: Abdomen is flat.      Palpations: Abdomen is soft.   Skin:     General: Skin is warm and dry.   Neurological:      General: No focal deficit present.      Mental Status: She is alert. Mental status is at baseline.             Significant Labs: All pertinent labs within the past 24 hours have been reviewed.  CBC:   Recent Labs   Lab 11/07/24  0138 11/07/24  1732 11/08/24  0703   WBC 14.28* 12.67 11.75   HGB 8.3* 7.7* 8.1*   HCT 26.4* 23.5* 25.2*    319 397     CMP:   Recent Labs   Lab 11/07/24  0138 11/08/24  0703   * 130*   K 4.3 4.5   CL 98 95   CO2 20* 22*   * 128*   BUN 17 24*   CREATININE 2.7* 3.8*   CALCIUM 8.1* 8.5*   ALBUMIN 1.8* 1.8*   ANIONGAP 12 13       Significant Imaging: I have reviewed all pertinent imaging results/findings within the past 24 hours.    Assessment/Plan:      * Cellulitis of left thigh  L AKA- necrotic amputation surgical site- developing eschar 9x15x0.2cm and blisters to periwound  Continue IV antibiotics  "  General surgery consulted for wound debridement. Recs: continue recommendations per Wound Care   - Aquacel AG daily to L AKA    ID recs:   Continue vancomycin/ piperacillin tazobactam, can be switched to Augmentin and doxy on discharge for total of 14 days  Given one dose of metronidazole.  Difficult to determine length of therapy at this time but related to treatment response. Unfortunately, the aspect of the ischemic lesions complicate assessment of recovery.  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  Patient is status post CABG as well as stents that have clotted in her lower extremities    Pain control-- continue current regimen as BP allows  Cautious with narcs secondary to bradypnea/ decreased respiratory rate    Pressure injury of sacral region, unstageable  Wound care    - Pressure injury prevention interventions   - Triad ointment BID to sacrum/buttocks      Morbid obesity with BMI of 50.0-59.9, adult  Body mass index is 47.9 kg/m². Morbid obesity complicates all aspects of disease management from diagnostic modalities to treatment.           S/P AKA (above knee amputation) bilateral  Fall precautions     Chronic combined systolic and diastolic congestive heart failure  Patient has Combined Systolic and Diastolic heart failure that is Chronic. On presentation their CHF was well compensated. Most recent BNP and echo results are listed below.  No results for input(s): "BNP" in the last 72 hours.    Latest ECHO  Results for orders placed during the hospital encounter of 05/07/24    Echo    Interpretation Summary    Left Ventricle: The left ventricle is mildly dilated. Ventricular mass is normal. Normal wall thickness. Severe global hypokinesis present. There is severely reduced systolic function. Ejection fraction by visual approximation is 15%. There is diastolic dysfunction.    Right Ventricle: Severe right ventricular enlargement. Wall thickness is normal. Right ventricle wall motion has " global hypokinesis. Systolic function is mildly reduced.    Left Atrium: Left atrium is severely dilated. There is an atrial septal aneurysm.    Right Atrium: Right atrium is dilated.    Aortic Valve: There is mild aortic regurgitation.    Mitral Valve: There is mild regurgitation.    Tricuspid Valve: There is annular dilation present. There is normal leaflet mobility. There is severe functional regurgitation.    Pulmonary Artery: The estimated pulmonary artery systolic pressure is at least 24 mmHg. PASP is likely under-estimated in the setting of severe tricuspid regurgitation.    IVC/SVC: Central venous pressure of at least 8 mmHg.    No vegetation seen.    Current Heart Failure Medications       Plan  - Monitor strict I&Os and daily weights.    - Place on telemetry  - Low sodium diet  - Place on fluid restriction of 1 L.   - Cardiology has not been consulted  - The patient's volume status is stable but not at their baseline as indicated by edema- UF with HD as tolerated. Lasix DC'd 10/30 due to low BP        Anemia due to chronic kidney disease, on chronic dialysis  Anemia is likely due to acute blood loss which was from bleeding from L stump wound . Most recent hemoglobin and hematocrit are listed below.  Recent Labs     11/07/24  0138 11/07/24  1732 11/08/24  0703   HGB 8.3* 7.7* 8.1*   HCT 26.4* 23.5* 25.2*       Plan  - Monitor serial CBC daily  - Transfuse PRBC if patient becomes hemodynamically unstable, symptomatic or H/H drops below 7/21.  - Patient has received 3 units of PRBCs on 11/3  and one unit on 11/6  - Patient's anemia is currently worsening. Will consulted GI , EGD was done on 11/07, with no source of bleeding seen  - H&H stable    Peripheral vascular disease  On Eliquis/ plavix--- held due to bleed from the stump which resolved and  possible GI bleed but EGD did not show any source of bleeding  resume Plavix and monitor        VTE Risk Mitigation (From admission, onward)           Ordered      IP VTE HIGH RISK PATIENT  Once         10/28/24 2114     Place sequential compression device  Until discontinued         10/28/24 2114     Reason for No Pharmacological VTE Prophylaxis  Once        Question:  Reasons:  Answer:  Already adequately anticoagulated on oral Anticoagulants    10/28/24 2114     heparin (porcine) injection 3,600 Units  As needed (PRN)         10/28/24 2114                    Discharge Planning   DEVAN: 11/12/2024     Code Status: Full Code   Is the patient medically ready for discharge?:     Reason for patient still in hospital (select all that apply): Pending disposition  Discharge Plan A: Skilled Nursing Facility ([facility])                  Chivo Parra MD  Department of Hospital Medicine   [facility]

## 2024-11-08 NOTE — SUBJECTIVE & OBJECTIVE
Interval History:     Patient is feeling okay getting dialysis in the room  Diarrhea has almost resolved, she has small amount of pasty stool, we will remove rectal tube  Patient is medically stable for discharge pending insurance Auth and HD chair    Review of Systems   Constitutional: Negative.    HENT: Negative.     Respiratory: Negative.     Cardiovascular: Negative.    Gastrointestinal: Negative.    Genitourinary: Negative.    Musculoskeletal: Negative.    Skin: Negative.    Neurological: Negative.    Psychiatric/Behavioral:  Positive for dysphoric mood.      Objective:     Vital Signs (Most Recent):  Temp: 98 °F (36.7 °C) (11/08/24 0929)  Pulse: 91 (11/08/24 1002)  Resp: 18 (11/08/24 0929)  BP: (!) 162/86 (11/08/24 1002)  SpO2: 96 % (11/08/24 0830) Vital Signs (24h Range):  Temp:  [97.6 °F (36.4 °C)-99 °F (37.2 °C)] 98 °F (36.7 °C)  Pulse:  [82-95] 91  Resp:  [16-21] 18  SpO2:  [93 %-99 %] 96 %  BP: (102-162)/(47-86) 162/86     Weight: 71.2 kg (156 lb 15.5 oz)  Body mass index is 47.9 kg/m².  No intake or output data in the 24 hours ending 11/08/24 1120        Physical Exam  Constitutional:       Appearance: She is ill-appearing.   Cardiovascular:      Rate and Rhythm: Normal rate.   Pulmonary:      Effort: Pulmonary effort is normal. No respiratory distress.      Breath sounds: Normal breath sounds.   Abdominal:      General: Abdomen is flat.      Palpations: Abdomen is soft.   Skin:     General: Skin is warm and dry.   Neurological:      General: No focal deficit present.      Mental Status: She is alert. Mental status is at baseline.             Significant Labs: All pertinent labs within the past 24 hours have been reviewed.  CBC:   Recent Labs   Lab 11/07/24  0138 11/07/24  1732 11/08/24  0703   WBC 14.28* 12.67 11.75   HGB 8.3* 7.7* 8.1*   HCT 26.4* 23.5* 25.2*    319 397     CMP:   Recent Labs   Lab 11/07/24  0138 11/08/24  0703   * 130*   K 4.3 4.5   CL 98 95   CO2 20* 22*   * 128*    BUN 17 24*   CREATININE 2.7* 3.8*   CALCIUM 8.1* 8.5*   ALBUMIN 1.8* 1.8*   ANIONGAP 12 13       Significant Imaging: I have reviewed all pertinent imaging results/findings within the past 24 hours.

## 2024-11-08 NOTE — PT/OT/SLP PROGRESS
Physical Therapy Treatment    Patient Name:  Suyapa Connelly   MRN:  [id number]    Recommendations:     Discharge Recommendations: Moderate Intensity Therapy  Discharge Equipment Recommendations:  (TBD next level of care)  Barriers to discharge:  decreased mobility,strength and endurance    Assessment:     Suyapa Connelly is a 59 y.o. female admitted with a medical diagnosis of Cellulitis of left thigh.  She presents with the following impairments/functional limitations: weakness, impaired endurance, impaired functional mobility, impaired cognition, decreased upper extremity function, decreased lower extremity function, decreased safety awareness, pain, decreased ROM, impaired coordination, impaired skin, impaired joint extensibility, orthopedic precautions,pt requires Max/Tot A X 2 with bed mobility and appears confused and in pain,pt will benefit from moderate intensity therapy upon discharge.    Rehab Prognosis: Fair; patient would benefit from acute skilled PT services to address these deficits and reach maximum level of function.    Recent Surgery: Procedure(s) (LRB):  EGD (ESOPHAGOGASTRODUODENOSCOPY) (N/A) 1 Day Post-Op    Plan:     During this hospitalization, patient to be seen 3 x/week to address the identified rehab impairments via therapeutic activities, therapeutic exercises, neuromuscular re-education, wheelchair management/training and progress toward the following goals:    Plan of Care Expires:  12/01/24    Subjective     Chief Complaint: n/a  Patient/Family Comments/goals: pt requests a paper to sign when therapy comes.  Pain/Comfort:  Pain Rating 1: 8/10  Location - Side 1: Left  Location - Orientation 1: distal  Location 1: leg  Pain Addressed 1: Reposition, Distraction, Cessation of Activity, Nurse notified  Pain Rating Post-Intervention 1: 8/10      Objective:     Communicated with nsg prior to session.  Patient found supine with bowel management system, peripheral IV, telemetry upon PT entry  to room.     General Precautions: Standard, fall  Orthopedic Precautions: N/A  Braces: N/A  Respiratory Status: Room air     Functional Mobility:  Gait: n/a      AM-PAC 6 CLICK MOBILITY  Turning over in bed (including adjusting bedclothes, sheets and blankets)?: 2  Moving from lying on back to sitting on the side of the bed?: 2  Moving to and from a bed to a chair (including a wheelchair)?: 1  Need to walk in hospital room?: 1  Climbing 3-5 steps with a railing?: 1       Treatment & Education: pt sat EOB < 5 mins with Max A and posterior lean,answered all pt's questions,pt is Tot A X 2 with bed mobility,pt soiled from rectal tube leakage.      Patient left supine with all lines intact, call button in reach, and nsg present..    GOALS: see general POC  Multidisciplinary Problems       Physical Therapy Goals          Problem: Physical Therapy    Goal Priority Disciplines Outcome Interventions   Physical Therapy Goal     PT, PT/OT Not Progressing    Description: Goals to be met by: 24     Patient will increase functional independence with mobility by performin. Supine to sit with Moderate Assistance  2. Sit to supine with Moderate Assistance  3. Rolling to Left and Right with Minimal Assistance.  4. Bed to chair transfer with Moderate Assistance using Slideboard  5. Wheelchair propulsion x 25 feet with Minimal Assistance using bilateral upper extremities                         Time Tracking:     PT Received On: 24  PT Start Time: 1354     PT Stop Time: 1415  PT Total Time (min): 21 min     Billable Minutes: Therapeutic Activity 21       PT/PTA: PTA     Number of PTA visits since last PT visit: 1     2024

## 2024-11-08 NOTE — CONSULTS
[facility] Medicine  Telemedicine Consult Note    Patient Name: Suyapa Connelly  MRN: [id number]  Admission Date: 10/28/2024  Hospital Length of Stay: 11 days  Attending Physician: Chivo Parra MD   Primary Care Provider: Vanessa Noel MD         Thank you for your consult to [facility]. We have reviewed the patient chart. This patient does meet criteria for [facility] service at this time.  Will assume care on 11/09/24 at 7AM.          Aurora Feliciano MD  Department of [facility] Medicine   [facility]

## 2024-11-08 NOTE — PT/OT/SLP PROGRESS
Occupational Therapy   Treatment    Name: Suyapa Connelly  MRN: [id number]  Admitting Diagnosis:  Cellulitis of left thigh  1 Day Post-Op    Recommendations:     Discharge Recommendations: Moderate Intensity Therapy  Discharge Equipment Recommendations:  to be determined by next level of care  Barriers to discharge:  Decreased caregiver support, Other (Comment) (Increased level of assistance)    Assessment:     Suyapa Connelly is a 59 y.o. female with a medical diagnosis of Cellulitis of left thigh.  Performance deficits affecting function are weakness, impaired endurance, impaired self care skills, impaired functional mobility, impaired balance, decreased upper extremity function, decreased lower extremity function, decreased safety awareness, pain, decreased ROM, impaired coordination, impaired skin, edema.    Pt found in bed, agreeable to therapy.  Pt oriented but confused.  Pt requires Max-Tot A with bed mobility and EOB sitting.  Pt with poor tolerance of therapy 2/2 pain.  Continue OT services to address functional goals, progressing as able.      Rehab Prognosis:  Fair; patient would benefit from acute skilled OT services to address these deficits and reach maximum level of function.       Plan:     Patient to be seen 3 x/week to address the above listed problems via self-care/home management, therapeutic activities, therapeutic exercises  Plan of Care Expires:    Plan of Care Reviewed with: patient    Subjective     Chief Complaint: pain   Patient/Family Comments/goals: to go home   Pain/Comfort:  Pain Rating 1: 8/10  Location - Side 1: Left  Location - Orientation 1: distal  Location 1: leg  Pain Addressed 1: Cessation of Activity, Nurse notified  Pain Rating Post-Intervention 1: 8/10    Objective:     Communicated with: nurse prior to session.  Patient found HOB elevated with bowel management system, peripheral IV, telemetry upon OT entry to room.    General Precautions: Standard, fall    Orthopedic  Precautions:N/A (B AKA)  Braces: N/A  Respiratory Status: Room air     Occupational Performance:     Bed Mobility:    Patient completed Rolling/Turning to Right with total assistance  Patient completed Scooting/Bridging with dependent and 2 persons, supine to HOB   Patient completed Supine to Sit with total assistance and 2 persons  Patient completed Sit to Supine with total assistance and 2 persons     AMPAC 6 Click ADL: 11    Treatment & Education:  Pt sat EOB with Max A 2/2 posterior lean tolerating less than 5 min 2/2 pain in LLE.   Pt performed BUE AROM ex x 10 reps for shld flex and forward punches.      Patient left HOB elevated with all lines intact, call button in reach, bed alarm on, and nurse present    GOALS:   Multidisciplinary Problems       Occupational Therapy Goals          Problem: Occupational Therapy    Goal Priority Disciplines Outcome Interventions   Occupational Therapy Goal     OT, PT/OT Progressing    Description: Goals to be met by: 12/1/2024     Patient will increase functional independence with ADLs by performing:    UE Dressing with Set-up Assistance.  LE Dressing with Minimal Assistance.  Grooming while EOB with Set-up Assistance.  Sitting at edge of bed x10 minutes with Supervision.  Rolling to Bilateral with Modified [place].   Supine to sit with Minimal Assistance.                         Time Tracking:     OT Date of Treatment: 11/08/24  OT Start Time: 1354  OT Stop Time: 1415  OT Total Time (min): 21 min    Billable Minutes:Therapeutic Activity 13  Therapeutic Exercise 8            11/8/2024

## 2024-11-08 NOTE — ASSESSMENT & PLAN NOTE
On Eliquis/ plavix--- held due to bleed from the stump which resolved and  possible GI bleed but EGD did not show any source of bleeding  resume Plavix and monitor

## 2024-11-08 NOTE — ASSESSMENT & PLAN NOTE
Anemia is likely due to acute blood loss which was from bleeding from L stump wound . Most recent hemoglobin and hematocrit are listed below.  Recent Labs     11/07/24  0138 11/07/24  1732 11/08/24  0703   HGB 8.3* 7.7* 8.1*   HCT 26.4* 23.5* 25.2*       Plan  - Monitor serial CBC daily  - Transfuse PRBC if patient becomes hemodynamically unstable, symptomatic or H/H drops below 7/21.  - Patient has received 3 units of PRBCs on 11/3  and one unit on 11/6  - Patient's anemia is currently worsening. Will consulted GI , EGD was done on 11/07, with no source of bleeding seen  - H&H stable

## 2024-11-08 NOTE — PROCEDURES
Patient seen and examined on dialysis. Tolerating HD well  Vitals:    11/08/24 0830 11/08/24 0929 11/08/24 0932 11/08/24 1002   BP:  128/60 (!) 123/47 (!) 162/86   Patient Position:       Pulse: 95 95 86 91   Resp: 17 18     Temp:  98 °F (36.7 °C)     TempSrc:  Temporal     SpO2: 96%      Weight:       Height:           With any question please call  [phone]  ANDREA Barrientos MD    [facility]     RAUL Back MD,   MD ANDREA Melton MD E. V. Harmon, NP I.Goldvarg-Abud, NP    [address]  [place], [address]

## 2024-11-09 LAB
ALBUMIN SERPL BCP-MCNC: 1.8 G/DL (ref 3.5–5.2)
ANION GAP SERPL CALC-SCNC: 12 MMOL/L (ref 8–16)
BUN SERPL-MCNC: 22 MG/DL (ref 6–20)
CALCIUM SERPL-MCNC: 8.4 MG/DL (ref 8.7–10.5)
CHLORIDE SERPL-SCNC: 95 MMOL/L (ref 95–110)
CO2 SERPL-SCNC: 25 MMOL/L (ref 23–29)
CREAT SERPL-MCNC: 3 MG/DL (ref 0.5–1.4)
EST. GFR  (NO RACE VARIABLE): 17 ML/MIN/1.73 M^2
GLUCOSE SERPL-MCNC: 213 MG/DL (ref 70–110)
PHOSPHATE SERPL-MCNC: 3.1 MG/DL (ref 2.7–4.5)
POCT GLUCOSE: 224 MG/DL (ref 70–110)
POCT GLUCOSE: 230 MG/DL (ref 70–110)
POCT GLUCOSE: 236 MG/DL (ref 70–110)
POCT GLUCOSE: 292 MG/DL (ref 70–110)
POTASSIUM SERPL-SCNC: 4.2 MMOL/L (ref 3.5–5.1)
SODIUM SERPL-SCNC: 132 MMOL/L (ref 136–145)
TB INDURATION 48 - 72 HR READ: NORMAL
TB SKIN TEST 48 - 72 HR READ: NEGATIVE

## 2024-11-09 PROCEDURE — 99900035 HC TECH TIME PER 15 MIN (STAT): Mod: HCNC

## 2024-11-09 PROCEDURE — 25000003 PHARM REV CODE 250: Mod: HCNC | Performed by: STUDENT IN AN ORGANIZED HEALTH CARE EDUCATION/TRAINING PROGRAM

## 2024-11-09 PROCEDURE — 94660 CPAP INITIATION&MGMT: CPT | Mod: HCNC

## 2024-11-09 PROCEDURE — 11000001 HC ACUTE MED/SURG PRIVATE ROOM: Mod: HCNC

## 2024-11-09 PROCEDURE — 94761 N-INVAS EAR/PLS OXIMETRY MLT: CPT | Mod: HCNC

## 2024-11-09 PROCEDURE — 25000003 PHARM REV CODE 250: Mod: HCNC | Performed by: INTERNAL MEDICINE

## 2024-11-09 PROCEDURE — 36415 COLL VENOUS BLD VENIPUNCTURE: CPT | Mod: HCNC | Performed by: STUDENT IN AN ORGANIZED HEALTH CARE EDUCATION/TRAINING PROGRAM

## 2024-11-09 PROCEDURE — 25000003 PHARM REV CODE 250: Mod: HCNC

## 2024-11-09 PROCEDURE — 80069 RENAL FUNCTION PANEL: CPT | Mod: HCNC | Performed by: STUDENT IN AN ORGANIZED HEALTH CARE EDUCATION/TRAINING PROGRAM

## 2024-11-09 RX ORDER — AMOXICILLIN AND CLAVULANATE POTASSIUM 500; 125 MG/1; MG/1
1 TABLET, FILM COATED ORAL 2 TIMES DAILY
Status: DISCONTINUED | OUTPATIENT
Start: 2024-11-09 | End: 2024-11-12 | Stop reason: HOSPADM

## 2024-11-09 RX ORDER — DOXYCYCLINE HYCLATE 100 MG
100 TABLET ORAL EVERY 12 HOURS
Status: DISCONTINUED | OUTPATIENT
Start: 2024-11-09 | End: 2024-11-12 | Stop reason: HOSPADM

## 2024-11-09 RX ADMIN — INSULIN ASPART 8 UNITS: 100 INJECTION, SOLUTION INTRAVENOUS; SUBCUTANEOUS at 04:11

## 2024-11-09 RX ADMIN — INSULIN GLARGINE 5 UNITS: 100 INJECTION, SOLUTION SUBCUTANEOUS at 09:11

## 2024-11-09 RX ADMIN — Medication 1 CAPSULE: at 10:11

## 2024-11-09 RX ADMIN — AMOXICILLIN AND CLAVULANATE POTASSIUM 500 MG: 500; 125 TABLET, FILM COATED ORAL at 09:11

## 2024-11-09 RX ADMIN — INSULIN ASPART 4 UNITS: 100 INJECTION, SOLUTION INTRAVENOUS; SUBCUTANEOUS at 06:11

## 2024-11-09 RX ADMIN — INSULIN ASPART 2 UNITS: 100 INJECTION, SOLUTION INTRAVENOUS; SUBCUTANEOUS at 08:11

## 2024-11-09 RX ADMIN — SODIUM BICARBONATE 650 MG TABLET 650 MG: at 10:11

## 2024-11-09 RX ADMIN — INSULIN ASPART 4 UNITS: 100 INJECTION, SOLUTION INTRAVENOUS; SUBCUTANEOUS at 12:11

## 2024-11-09 RX ADMIN — SODIUM BICARBONATE 650 MG TABLET 650 MG: at 04:11

## 2024-11-09 RX ADMIN — SERTRALINE HYDROCHLORIDE 100 MG: 100 TABLET ORAL at 10:11

## 2024-11-09 RX ADMIN — PANTOPRAZOLE SODIUM 40 MG: 40 TABLET, DELAYED RELEASE ORAL at 10:11

## 2024-11-09 RX ADMIN — ATORVASTATIN CALCIUM 80 MG: 40 TABLET, FILM COATED ORAL at 10:11

## 2024-11-09 RX ADMIN — PREGABALIN 50 MG: 50 CAPSULE ORAL at 10:11

## 2024-11-09 RX ADMIN — CALCITRIOL CAPSULES 0.25 MCG 0.5 MCG: 0.25 CAPSULE ORAL at 10:11

## 2024-11-09 RX ADMIN — DOXYCYCLINE HYCLATE 100 MG: 100 TABLET, COATED ORAL at 10:11

## 2024-11-09 RX ADMIN — SODIUM BICARBONATE 650 MG TABLET 650 MG: at 09:11

## 2024-11-09 RX ADMIN — AMOXICILLIN AND CLAVULANATE POTASSIUM 500 MG: 500; 125 TABLET, FILM COATED ORAL at 10:11

## 2024-11-09 RX ADMIN — DOXYCYCLINE HYCLATE 100 MG: 100 TABLET, COATED ORAL at 09:11

## 2024-11-09 NOTE — PROGRESS NOTES
Nephrology Progress Note     Consult Requested By: Aurora Feliciano MD  Reason for Consult: ESRD    SUBJECTIVE:     Review of Systems   Constitutional:  Positive for malaise/fatigue. Negative for chills and fever.   HENT:  Negative for congestion and sore throat.    Eyes:  Negative for blurred vision, double vision and photophobia.   Respiratory:  Negative for cough and shortness of breath.    Cardiovascular:  Negative for chest pain, palpitations and leg swelling.   Gastrointestinal:  Negative for abdominal pain, diarrhea, nausea and vomiting.   Genitourinary:  Negative for dysuria and urgency.   Musculoskeletal:  Negative for joint pain and myalgias.   Skin:  Negative for itching and rash.   Neurological:  Positive for weakness. Negative for dizziness, sensory change and headaches.   Endo/Heme/Allergies:  Negative for polydipsia. Does not bruise/bleed easily.   Psychiatric/Behavioral:  Positive for depression.        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    Osteomyelitis     Osteomyelitis of left foot 04/30/2021    Ulcer of left foot     Vaginal delivery     x1          OBJECTIVE:     Vital Signs (Most Recent)  Vitals:    11/09/24 0716 11/09/24 0808 11/09/24 1103 11/09/24 1208   BP:  139/65 (!) 111/55    BP Location:       Patient Position:  Lying Lying    Pulse:  98 93 93   Resp:  18 18    Temp:  98 °F (36.7 °C) 98.9 °F (37.2 °C)    TempSrc:  Oral Oral    SpO2: 99% 98% (!) 92%    Weight:       Height:                       Medications:   amoxicillin-clavulanate 500-125mg  1 tablet Oral BID    atorvastatin  80 mg Oral Daily    calcitRIOL  0.5 mcg Oral Daily    cinacalcet  30 mg Oral Every Mon, Wed, Fri    doxycycline  100 mg Oral Q12H     insulin glargine U-100  5 Units Subcutaneous QHS    pantoprazole  40 mg Oral Daily    pregabalin  50 mg Oral Daily    sertraline  100 mg Oral Daily    sodium bicarbonate  650 mg Oral TID    vitamin renal formula (B-complex-vitamin c-folic acid)  1 capsule Oral Daily           Physical Exam  Vitals and nursing note reviewed.   Constitutional:       General: She is not in acute distress.     Appearance: She is not diaphoretic.      Comments: Somnolent   HENT:      Head: Normocephalic and atraumatic.      Mouth/Throat:      Pharynx: No oropharyngeal exudate.   Eyes:      General: No scleral icterus.     Conjunctiva/sclera: Conjunctivae normal.      Pupils: Pupils are equal, round, and reactive to light.   Cardiovascular:      Rate and Rhythm: Normal rate and regular rhythm.      Heart sounds: Normal heart sounds. No murmur heard.  Pulmonary:      Effort: Pulmonary effort is normal. No respiratory distress.      Breath sounds: Normal breath sounds.   Abdominal:      General: Bowel sounds are normal. There is no distension.      Palpations: Abdomen is soft.      Tenderness: There is no abdominal tenderness.   Musculoskeletal:         General: Normal range of motion.      Cervical back: Normal range of motion and neck supple.      Comments: [facility]VC  Bilat AKA   Skin:     General: Skin is warm and dry.      Findings: No erythema.   Neurological:      Mental Status: She is alert and oriented to person, place, and time.      Cranial Nerves: No cranial nerve deficit.      Motor: Weakness present.   Psychiatric:         Mood and Affect: Affect normal.         Cognition and Memory: Memory normal.         Judgment: Judgment normal.         Laboratory:  Recent Labs   Lab 11/03/24  1024 11/03/24  1432 11/06/24  1949 11/07/24  0138 11/07/24  1732 11/08/24  0703   WBC 15.75*   < > 18.98* 14.28* 12.67 11.75   HGB 7.3*   < > 7.0* 8.3* 7.7* 8.1*   HCT 21.5*   < > 21.3* 26.4* 23.5* 25.2*      < > 310 283 319 397   MONO 10.6   1.7*  --  6.3  1.2*  --  9.1  1.2*  --    EOSINOPHIL 0.5  --  1.2  --  2.5  --     < > = values in this interval not displayed.     Recent Labs   Lab 11/07/24 0138 11/08/24 0703 11/09/24 0417   * 130* 132*   K 4.3 4.5 4.2   CL 98 95 95   CO2 20* 22* 25   BUN 17 24* 22*   CREATININE 2.7* 3.8* 3.0*   CALCIUM 8.1* 8.5* 8.4*   PHOS 2.8 4.7* 3.1       Diagnostic Results:  X-Ray: Reviewed  US: Reviewed  Echo: Reviewed  ASSESSMENT/PLAN:     1. [facility]D   [facility] HD yesterday no issues   -- labs and HD  Monday then keep MWF     2. HTN  -- Controlled off meds    3. Anemia of chronic kidney disease treated with NAEEM  EPogen with each HD  Acute blood loss s/p transfusion drop again will need blood will give on HD   Recent Labs   Lab 11/07/24  0138 11/07/24  1732 11/08/24 0703   HGB 8.3* 7.7* 8.1*   HCT 26.4* 23.5* 25.2*    319 397       Iron  Lab Results   Component Value Date    IRON 94 06/22/2024    TIBC 265 06/22/2024    FERRITIN 757 (H) 06/22/2024       4. MBD - Start Calcitriol, continue Cinacalcet  Recent Labs   Lab 11/09/24 0417   CALCIUM 8.4*   PHOS 3.1     Recent Labs   Lab 11/03/24  0553   MG 1.8       Lab Results   Component Value Date    .8 (H) 10/28/2024    CALCIUM 8.4 (L) 11/09/2024    CAION 0.92 (L) 05/17/2023    PHOS 3.1 11/09/2024     Lab Results   Component Value Date    [id number] 9 (L) 10/28/2024     Acidosis - add Bicarb 650 TID  Lab Results   Component Value Date    CO2 25 11/09/2024       5. Hemodialysis Access - LIJ Permacath    6. Nutrition/Hypoalbuminemia  Recent Labs   Lab 11/08/24  0703 11/09/24  0417   ALBUMIN 1.8* 1.8*     Nepro with meals TID. Renal vitamins daily      Thank you for the consult, will follow  With any question please call   Sima Barrientos MD    [facility]  RAUL Back MD,   MD ANDREA Melton MD E. V. Harmon, NP    [address]  [place], [address]  [phone]  [phone]  After hours [phone]

## 2024-11-09 NOTE — NURSING
Notified MD (Chivo Parra): pt is confused, she thinks she is at her moms house and is also situational confused. She has not been like this for us in the last 3 days      No new orders at this time.

## 2024-11-09 NOTE — PROGRESS NOTES
Therapy with vancomycin complete and/or consult discontinued by provider.  Pharmacy will sign off, please re-consult as needed.     Thanks,     Ramu Otero, Pharm.D

## 2024-11-09 NOTE — SUBJECTIVE & OBJECTIVE
"Interval History: Virtual follow up visit for suspected Leg wound, left [S89.975V]  Wound infection [T14.8XXA, L08.9] present on admission.   This service was provided by telemedicine.    The patient location is: [facility]   Admitted 10/28/2024  9:02 PM    CC: L thigh cellulitis    The patient is able to provide adequate history. History was obtained from patient and past medical records.   No significant events overnight reported.  Patient complains of nothing new; reports she would "run if I could."      Data  Details     [x]   Lab results reviewed 11/9/2024  BGs > goal. H&H stable. Hyponatremia. Mag 1.8. Vit D = 9.    [x]   Micro reports reviewed 11/9/2024  Covid-positive    []   Pathology reports reviewed 11/9/2024     []   Imaging reports reviewed 11/9/2024     []   Cardiology Procedure reports reviewed 11/9/2024     []   Non- records/CareEverywhere notes reviewed 11/9/2024      []  Tests/studies orders placed or verified 11/9/2024       [x]  Independently viewed/assessed 11/9/2024  Imaging X-ray: Chest 10/29: was negative for acute cardiopulmonary process and was positive for cardiomegaly    []  11/9/2024 Discussion of:      Review of Systems   Constitutional:  Positive for activity change. Negative for fever.   Respiratory:  Negative for shortness of breath.      Objective:     Vital Signs (Most Recent):  Temp: 98 °F (36.7 °C) (11/09/24 0808)  Pulse: 98 (11/09/24 0808)  Resp: 18 (11/09/24 0808)  BP: 139/65 (11/09/24 0808)  SpO2: 98 % (11/09/24 0808) Vital Signs (24h Range):  Temp:  [98 °F (36.7 °C)-99.9 °F (37.7 °C)] 98 °F (36.7 °C)  Pulse:  [80-98] 98  Resp:  [18] 18  SpO2:  [94 %-99 %] 98 %  BP: (111-181)/() 139/65     Weight: 71.2 kg (156 lb 15.5 oz)  Body mass index is 47.9 kg/m².    Intake/Output Summary (Last 24 hours) at 11/9/2024 0959  Last data filed at 11/8/2024 1800  Gross per 24 hour   Intake 150 ml   Output 2500 ml   Net -2350 ml      Physical Exam  Constitutional:       General: She is " awake.   Cardiovascular:      Comments: Monitor and/or Vital signs reviewed at time of visit  Pulmonary:      Effort: Pulmonary effort is normal. No accessory muscle usage or respiratory distress.   Musculoskeletal:      Right Lower Extremity: Right leg is amputated above knee.      Left Lower Extremity: Left leg is amputated above knee.   Neurological:      Mental Status: She is alert. She is not disoriented.   Psychiatric:         Attention and Perception: Attention normal.         Behavior: Behavior is cooperative.         Significant Labs:   Recent Labs   Lab 07/08/24  0000 10/25/24  1501   HGBA1C 6.4* 5.9*     Recent Labs   Lab 11/09/24  0540 11/09/24  1105 11/09/24  1643   POCTGLUCOSE 230* 236* 292*     Recent Labs   Lab 10/29/24  1031   TSH 3.304     Recent Labs   Lab 11/07/24  0138 11/07/24  1732 11/08/24  0703   WBC 14.28* 12.67 11.75   HGB 8.3* 7.7* 8.1*   HCT 26.4* 23.5* 25.2*    319 397     Recent Labs   Lab 11/03/24  1024 11/06/24  1949 11/07/24  1732   GRAN 80.5*  12.7* 86.1*  16.3* 78.4*  9.9*   LYMPH 6.5*  1.0 4.8*  0.9* 8.2*  1.0   MONO 10.6  1.7* 6.3  1.2* 9.1  1.2*   EOS 0.1 0.2 0.3     Recent Labs   Lab 11/03/24  0553 11/04/24  0822 11/07/24  0138 11/08/24  0703 11/09/24  0417   *   < > 130* 130* 132*   K 4.4   < > 4.3 4.5 4.2   CL 97   < > 98 95 95   CO2 25   < > 20* 22* 25   BUN 30*   < > 17 24* 22*   CREATININE 3.3*   < > 2.7* 3.8* 3.0*   *   < > 260* 128* 213*   CALCIUM 7.5*   < > 8.1* 8.5* 8.4*   ALBUMIN 1.6*   < > 1.8* 1.8* 1.8*   MG 1.8  --   --   --   --    PHOS 2.9   < > 2.8 4.7* 3.1    < > = values in this interval not displayed.     Recent Labs   Lab 06/12/24  0837 06/22/24  0921 09/30/24  0840 10/29/24  0856   PROCAL 0.93*  --   --   --    LACTATE  --   --  0.8 1.6   FERRITIN  --  757*  --   --      Results for orders placed or performed during the hospital encounter of 10/25/24   Vitamin D    Collection Time: 10/28/24  5:29 AM   Result Value Ref Range     Vit D, 25-Hydroxy 9 (L) 30 - 96 ng/mL     SARS-CoV2 (COVID-19) Qualitative PCR (no units)   Date Value   10/29/2024 Detected (A)   12/22/2023 Presumptive Negative   05/03/2021 Not Detected   04/26/2021 Not Detected   09/05/2020 Not Detected     SARS-CoV-2 RNA, Amplification, Qual (no units)   Date Value   02/05/2024 Negative   11/20/2023 Negative   08/11/2022 Negative   01/24/2022 Negative   05/17/2021 Negative   03/09/2021 Negative   03/02/2021 Negative   08/17/2020 Negative   07/17/2020 Negative     POC Rapid COVID (no units)   Date Value   03/12/2022 Negative   01/31/2022 Negative   01/17/2022 Negative   12/21/2021 Negative   03/23/2021 Negative     Results for orders placed during the hospital encounter of 10/28/24    Echo Saline Bubble? No; Ultrasound enhancing contrast? No    Interpretation Summary    Left Ventricle: The left ventricle is normal in size. Mildly increased wall thickness. Regional wall motion abnormalities present. Septal motion is abnormal is consistent with bundle branch block.    Right Ventricle: Severe right ventricular enlargement. Systolic function is severely reduced.    Left Atrium: Atrial septum is bulging to the left.    Right Atrium: Right atrium is severely dilated.    Aortic Valve: The aortic valve is a trileaflet valve. There is mild aortic valve sclerosis. There is mild aortic regurgitation with a centrally directed jet.    Mitral Valve: There is mild regurgitation with a centrally directed jet.    Tricuspid Valve: There is severe regurgitation with a centrally directed jet.    Pulmonary Artery: No pulmonary hypertension. The estimated pulmonary artery systolic pressure is 26 mmHg.    IVC/SVC: Elevated venous pressure at 15 mmHg.      Echo Saline Bubble? No; Ultrasound enhancing contrast? No    Left Ventricle: The left ventricle is normal in size. Mildly increased   wall thickness. Regional wall motion abnormalities present. Septal motion   is abnormal is consistent with bundle  branch block.    Right Ventricle: Severe right ventricular enlargement. Systolic   function is severely reduced.    Left Atrium: Atrial septum is bulging to the left.    Right Atrium: Right atrium is severely dilated.    Aortic Valve: The aortic valve is a trileaflet valve. There is mild   aortic valve sclerosis. There is mild aortic regurgitation with a   centrally directed jet.    Mitral Valve: There is mild regurgitation with a centrally directed   jet.    Tricuspid Valve: There is severe regurgitation with a centrally   directed jet.    Pulmonary Artery: No pulmonary hypertension. The estimated pulmonary   artery systolic pressure is 26 mmHg.    IVC/SVC: Elevated venous pressure at 15 mmHg.      Labs and Imaging listed above were reviewed.

## 2024-11-10 LAB
POCT GLUCOSE: 155 MG/DL (ref 70–110)
POCT GLUCOSE: 198 MG/DL (ref 70–110)
POCT GLUCOSE: 210 MG/DL (ref 70–110)
POCT GLUCOSE: 235 MG/DL (ref 70–110)

## 2024-11-10 PROCEDURE — 25000003 PHARM REV CODE 250: Mod: HCNC

## 2024-11-10 PROCEDURE — 11000001 HC ACUTE MED/SURG PRIVATE ROOM: Mod: HCNC

## 2024-11-10 PROCEDURE — 25000003 PHARM REV CODE 250: Mod: HCNC | Performed by: INTERNAL MEDICINE

## 2024-11-10 PROCEDURE — 25000003 PHARM REV CODE 250: Mod: HCNC | Performed by: STUDENT IN AN ORGANIZED HEALTH CARE EDUCATION/TRAINING PROGRAM

## 2024-11-10 PROCEDURE — 94660 CPAP INITIATION&MGMT: CPT | Mod: HCNC

## 2024-11-10 PROCEDURE — 99900035 HC TECH TIME PER 15 MIN (STAT): Mod: HCNC

## 2024-11-10 RX ORDER — OXYCODONE HYDROCHLORIDE 5 MG/1
5 TABLET ORAL EVERY 6 HOURS PRN
Status: DISCONTINUED | OUTPATIENT
Start: 2024-11-10 | End: 2024-11-12 | Stop reason: HOSPADM

## 2024-11-10 RX ORDER — PREGABALIN 50 MG/1
50 CAPSULE ORAL 2 TIMES DAILY
Status: DISCONTINUED | OUTPATIENT
Start: 2024-11-10 | End: 2024-11-12 | Stop reason: HOSPADM

## 2024-11-10 RX ADMIN — OXYCODONE HYDROCHLORIDE 10 MG: 10 TABLET, FILM COATED, EXTENDED RELEASE ORAL at 09:11

## 2024-11-10 RX ADMIN — SODIUM BICARBONATE 650 MG TABLET 650 MG: at 09:11

## 2024-11-10 RX ADMIN — DOXYCYCLINE HYCLATE 100 MG: 100 TABLET, COATED ORAL at 09:11

## 2024-11-10 RX ADMIN — INSULIN ASPART 4 UNITS: 100 INJECTION, SOLUTION INTRAVENOUS; SUBCUTANEOUS at 05:11

## 2024-11-10 RX ADMIN — ATORVASTATIN CALCIUM 80 MG: 40 TABLET, FILM COATED ORAL at 09:11

## 2024-11-10 RX ADMIN — SERTRALINE HYDROCHLORIDE 100 MG: 100 TABLET ORAL at 09:11

## 2024-11-10 RX ADMIN — INSULIN ASPART 2 UNITS: 100 INJECTION, SOLUTION INTRAVENOUS; SUBCUTANEOUS at 12:11

## 2024-11-10 RX ADMIN — INSULIN ASPART 2 UNITS: 100 INJECTION, SOLUTION INTRAVENOUS; SUBCUTANEOUS at 06:11

## 2024-11-10 RX ADMIN — SODIUM BICARBONATE 650 MG TABLET 650 MG: at 04:11

## 2024-11-10 RX ADMIN — INSULIN GLARGINE 5 UNITS: 100 INJECTION, SOLUTION SUBCUTANEOUS at 09:11

## 2024-11-10 RX ADMIN — Medication 1 CAPSULE: at 09:11

## 2024-11-10 RX ADMIN — INSULIN ASPART 2 UNITS: 100 INJECTION, SOLUTION INTRAVENOUS; SUBCUTANEOUS at 09:11

## 2024-11-10 RX ADMIN — AMOXICILLIN AND CLAVULANATE POTASSIUM 500 MG: 500; 125 TABLET, FILM COATED ORAL at 09:11

## 2024-11-10 RX ADMIN — PANTOPRAZOLE SODIUM 40 MG: 40 TABLET, DELAYED RELEASE ORAL at 09:11

## 2024-11-10 RX ADMIN — OXYCODONE 5 MG: 5 TABLET ORAL at 04:11

## 2024-11-10 RX ADMIN — PREGABALIN 50 MG: 50 CAPSULE ORAL at 09:11

## 2024-11-10 RX ADMIN — CALCITRIOL CAPSULES 0.25 MCG 0.5 MCG: 0.25 CAPSULE ORAL at 09:11

## 2024-11-10 RX ADMIN — PREGABALIN 50 MG: 50 CAPSULE ORAL at 04:11

## 2024-11-10 NOTE — PROGRESS NOTES
Nephrology Progress Note     Consult Requested By: Aurora Feliciano MD  Reason for Consult: ESRD    SUBJECTIVE:     Review of Systems   Constitutional:  Positive for malaise/fatigue. Negative for chills and fever.   HENT:  Negative for congestion and sore throat.    Eyes:  Negative for blurred vision, double vision and photophobia.   Respiratory:  Negative for cough and shortness of breath.    Cardiovascular:  Negative for chest pain, palpitations and leg swelling.   Gastrointestinal:  Negative for abdominal pain, diarrhea, nausea and vomiting.   Genitourinary:  Negative for dysuria and urgency.   Musculoskeletal:  Negative for joint pain and myalgias.   Skin:  Negative for itching and rash.   Neurological:  Positive for weakness. Negative for dizziness, sensory change and headaches.   Endo/Heme/Allergies:  Negative for polydipsia. Does not bruise/bleed easily.   Psychiatric/Behavioral:  Positive for depression.        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    Osteomyelitis     Osteomyelitis of left foot 04/30/2021    Ulcer of left foot     Vaginal delivery     x1          OBJECTIVE:     Vital Signs (Most Recent)  Vitals:    11/10/24 0710 11/10/24 0756 11/10/24 0923 11/10/24 1103   BP:  132/60  131/60   BP Location:    Right arm   Patient Position:  Lying  Lying   Pulse: 88 86  86   Resp:  17 16 18   Temp:  98.7 °F (37.1 °C)  98.3 °F (36.8 °C)   TempSrc:  Temporal  Axillary   SpO2:  100%  (!) 94%   Weight:       Height:                       Medications:   amoxicillin-clavulanate 500-125mg  1 tablet Oral BID    atorvastatin  80 mg Oral Daily    calcitRIOL  0.5 mcg Oral Daily    cinacalcet  30 mg Oral Every Mon, Wed, Fri    doxycycline  100 mg  Oral Q12H    insulin glargine U-100  5 Units Subcutaneous QHS    pantoprazole  40 mg Oral Daily    pregabalin  50 mg Oral Daily    sertraline  100 mg Oral Daily    sodium bicarbonate  650 mg Oral TID    vitamin renal formula (B-complex-vitamin c-folic acid)  1 capsule Oral Daily           Physical Exam  Vitals and nursing note reviewed.   Constitutional:       General: She is not in acute distress.     Appearance: She is not diaphoretic.      Comments: Somnolent   HENT:      Head: Normocephalic and atraumatic.      Mouth/Throat:      Pharynx: No oropharyngeal exudate.   Eyes:      General: No scleral icterus.     Conjunctiva/sclera: Conjunctivae normal.      Pupils: Pupils are equal, round, and reactive to light.   Cardiovascular:      Rate and Rhythm: Normal rate and regular rhythm.      Heart sounds: Normal heart sounds. No murmur heard.  Pulmonary:      Effort: Pulmonary effort is normal. No respiratory distress.      Breath sounds: Normal breath sounds.   Abdominal:      General: Bowel sounds are normal. There is no distension.      Palpations: Abdomen is soft.      Tenderness: There is no abdominal tenderness.   Musculoskeletal:         General: Normal range of motion.      Cervical back: Normal range of motion and neck supple.      Comments: [facility] CVC  Bilat AKA   Skin:     General: Skin is warm and dry.      Findings: No erythema.   Neurological:      Mental Status: She is alert and oriented to person, place, and time.      Cranial Nerves: No cranial nerve deficit.      Motor: Weakness present.   Psychiatric:         Mood and Affect: Affect normal.         Cognition and Memory: Memory normal.         Judgment: Judgment normal.         Laboratory:  Recent Labs   Lab 11/06/24  1949 11/07/24  0138 11/07/24  1732 11/08/24  0703   WBC 18.98* 14.28* 12.67 11.75   HGB 7.0* 8.3* 7.7* 8.1*   HCT 21.3* 26.4* 23.5* 25.2*    283 319 397   MONO 6.3  1.2*  --  9.1  1.2*  --    EOSINOPHIL 1.2  --  2.5  --      Recent  "Labs   Lab 11/07/24  0138 11/08/24  0703 11/09/24  0417   * 130* 132*   K 4.3 4.5 4.2   CL 98 95 95   CO2 20* 22* 25   BUN 17 24* 22*   CREATININE 2.7* 3.8* 3.0*   CALCIUM 8.1* 8.5* 8.4*   PHOS 2.8 4.7* 3.1       Diagnostic Results:  X-Ray: Reviewed  US: Reviewed  Echo: Reviewed  ASSESSMENT/PLAN:      ESRD - [facility]ThSat at [facility], Dr. Payne  -- MWF while here     -- labs and HD  Monday then keep MWF      HTN  -- Controlled off meds     Anemia of chronic kidney disease treated with NAEEM  EPogen with each HD  Acute blood loss s/p transfusion drop again will need blood will give on HD   Recent Labs   Lab 11/07/24  0138 11/07/24  1732 11/08/24  0703   HGB 8.3* 7.7* 8.1*   HCT 26.4* 23.5* 25.2*    319 397       Iron  Lab Results   Component Value Date    IRON 94 06/22/2024    TIBC 265 06/22/2024    FERRITIN 757 (H) 06/22/2024        MBD -  Calcitriol, continue Cinacalcet  Recent Labs   Lab 11/09/24  0417   CALCIUM 8.4*   PHOS 3.1     No results for input(s): "MG" in the last 168 hours.      Lab Results   Component Value Date    .8 (H) 10/28/2024    CALCIUM 8.4 (L) 11/09/2024    CAION 0.92 (L) 05/17/2023    PHOS 3.1 11/09/2024     Lab Results   Component Value Date    [id number] 9 (L) 10/28/2024     Acidosis - add Bicarb 650 TID  Lab Results   Component Value Date    CO2 25 11/09/2024        Hemodialysis Access - LIJ Permacath     Nutrition/Hypoalbuminemia  Recent Labs   Lab 11/08/24  0703 11/09/24  0417   ALBUMIN 1.8* 1.8*     Nepro with meals TID. Renal vitamins daily      Thank you for the consult, will follow  With any question please call   Sima Barrientos MD    [facility]  RAUL Back MD,   O. MD ANDREA Castañeda MD E. V. Harmon, NP    [address]  [place], [id number]  [phone]  After hours [phone]   "

## 2024-11-10 NOTE — ASSESSMENT & PLAN NOTE
Wound care    - Pressure injury prevention interventions   - Triad ointment BID to sacrum/buttocks

## 2024-11-10 NOTE — PT/OT/SLP PROGRESS
Physical Therapy      Patient Name:  Suyapa Connelly   MRN:  [id number]    Patient not seen today secondary to pt refusing treatment 2/2 stomach problems/pain, nurse notified.

## 2024-11-10 NOTE — ASSESSMENT & PLAN NOTE
Patient has Combined Systolic and Diastolic heart failure that is Chronic. On presentation their CHF was well compensated.     Latest ECHO  Results for orders placed during the hospital encounter of 05/07/24    Echo    Interpretation Summary    Left Ventricle: The left ventricle is mildly dilated. Ventricular mass is normal. Normal wall thickness. Severe global hypokinesis present. There is severely reduced systolic function. Ejection fraction by visual approximation is 15%. There is diastolic dysfunction.    Right Ventricle: Severe right ventricular enlargement. Wall thickness is normal. Right ventricle wall motion has global hypokinesis. Systolic function is mildly reduced.    Left Atrium: Left atrium is severely dilated. There is an atrial septal aneurysm.    Right Atrium: Right atrium is dilated.    Aortic Valve: There is mild aortic regurgitation.    Mitral Valve: There is mild regurgitation.    Tricuspid Valve: There is annular dilation present. There is normal leaflet mobility. There is severe functional regurgitation.    Pulmonary Artery: The estimated pulmonary artery systolic pressure is at least 24 mmHg. PASP is likely under-estimated in the setting of severe tricuspid regurgitation.    IVC/SVC: Central venous pressure of at least 8 mmHg.    No vegetation seen.    Current Heart Failure Medications       - Monitor strict I&Os and daily weights.    - Place on telemetry  - Low sodium diet  - Place on fluid restriction of 1 L.   - Cardiology has not been consulted  - The patient's volume status is stable but not at their baseline as indicated by edema- UF with HD as tolerated.   - Lasix DC'd 10/30 due to low BP

## 2024-11-10 NOTE — ASSESSMENT & PLAN NOTE
Anemia is likely due to acute blood loss which was from bleeding from L stump wound . Most recent hemoglobin and hematocrit are listed below.  Recent Labs     11/07/24  0138 11/07/24  1732 11/08/24  0703   HGB 8.3* 7.7* 8.1*   HCT 26.4* 23.5* 25.2*     - Monitor serial CBC   - Transfuse PRBC if patient becomes hemodynamically unstable, symptomatic or H/H drops below 7/21.  - Patient has received 3 units of PRBCs on 11/3  and one unit on 11/6  - Patient's anemia is currently stable  - EGD without identifiable source of bleeding  - H&H stable

## 2024-11-10 NOTE — PROGRESS NOTES
[facility] Medicine  Telemedicine Progress Note    Patient Name: Suyapa Connelly  MRN: [id number]  Patient Class: IP- Inpatient   Admission Date: 10/28/2024  Length of Stay: 12 days  Attending Physician: Aurora Feliciano MD  Primary Care Provider: Vanessa Noel MD    Subjective:     Principal Problem:Cellulitis of left thigh    HPI:  Patient is a 59-year-old female with a known past medical history of hypertension, hyperlipidemia, diabetes mellitus type 2, ESRD on HD, and deconditioning that presents the ER after being on floor throughout the evening.     Patient presents after being found on the floor by a neighbor today.  Patient reports that she got out of bed to change her pad and diaper but was unable to get back into bed, staying on the floor throughout the evening.  Patient's  who normally cares for her, was arrested yesterday and is in [facility] and patient was unable to care for herself.  When patient was found by neighbor on floor, EMS was called and patient was brought to the ER for further evaluation.  On arrival to ER, patient in no acute distress.  Labs essentially within normal limits.  Patient last dialyzed on Thursday and no signs of fluid overload or electrolyte abnormalities.  Patient has no family NS able to assist her at home other than her  who is currently incarcerated.  Patient admitted for social problems and hemodialysis.    Patient due to her open wounds and CT finding of her left thigh - emphysema can not rule out infection -     Overview/Hospital Course:  No notes on file    Interval History: Virtual follow up visit for suspected Leg wound, left [S81.802A]  Wound infection [T14.8XXA, L08.9] present on admission.   This service was provided by telemedicine.    The patient location is: [id number]/K404 A   Admitted 10/28/2024  9:02 PM    CC: L thigh cellulitis    The patient is able to provide adequate history. History was obtained from patient and past  "medical records.   No significant events overnight reported.  Patient complains of nothing new; reports she would "run if I could."      Data  Details     [x]   Lab results reviewed 11/9/2024  BGs > goal. H&H stable. Hyponatremia. Mag 1.8. Vit D = 9.    [x]   Micro reports reviewed 11/9/2024  Covid-positive    []   Pathology reports reviewed 11/9/2024     []   Imaging reports reviewed 11/9/2024     []   Cardiology Procedure reports reviewed 11/9/2024     []   Non- records/CareEverywhere notes reviewed 11/9/2024      []  Tests/studies orders placed or verified 11/9/2024       [x]  Independently viewed/assessed 11/9/2024  Imaging X-ray: Chest 10/29: was negative for acute cardiopulmonary process and was positive for cardiomegaly    []  11/9/2024 Discussion of:      Review of Systems   Constitutional:  Positive for activity change. Negative for fever.   Respiratory:  Negative for shortness of breath.      Objective:     Vital Signs (Most Recent):  Temp: 98 °F (36.7 °C) (11/09/24 0808)  Pulse: 98 (11/09/24 0808)  Resp: 18 (11/09/24 0808)  BP: 139/65 (11/09/24 0808)  SpO2: 98 % (11/09/24 0808) Vital Signs (24h Range):  Temp:  [98 °F (36.7 °C)-99.9 °F (37.7 °C)] 98 °F (36.7 °C)  Pulse:  [80-98] 98  Resp:  [18] 18  SpO2:  [94 %-99 %] 98 %  BP: (111-181)/() 139/65     Weight: 71.2 kg (156 lb 15.5 oz)  Body mass index is 47.9 kg/m².    Intake/Output Summary (Last 24 hours) at 11/9/2024 0959  Last data filed at 11/8/2024 1800  Gross per 24 hour   Intake 150 ml   Output 2500 ml   Net -2350 ml      Physical Exam  Constitutional:       General: She is awake.   Cardiovascular:      Comments: Monitor and/or Vital signs reviewed at time of visit  Pulmonary:      Effort: Pulmonary effort is normal. No accessory muscle usage or respiratory distress.   Musculoskeletal:      Right Lower Extremity: Right leg is amputated above knee.      Left Lower Extremity: Left leg is amputated above knee.   Neurological:      Mental Status: " She is alert. She is not disoriented.   Psychiatric:         Attention and Perception: Attention normal.         Behavior: Behavior is cooperative.         Significant Labs:   Recent Labs   Lab 07/08/24  0000 10/25/24  1501   HGBA1C 6.4* 5.9*     Recent Labs   Lab 11/09/24  0540 11/09/24  1105 11/09/24  1643   POCTGLUCOSE 230* 236* 292*     Recent Labs   Lab 10/29/24  1031   TSH 3.304     Recent Labs   Lab 11/07/24  0138 11/07/24  1732 11/08/24  0703   WBC 14.28* 12.67 11.75   HGB 8.3* 7.7* 8.1*   HCT 26.4* 23.5* 25.2*    319 397     Recent Labs   Lab 11/03/24  1024 11/06/24  1949 11/07/24  1732   GRAN 80.5*  12.7* 86.1*  16.3* 78.4*  9.9*   LYMPH 6.5*  1.0 4.8*  0.9* 8.2*  1.0   MONO 10.6  1.7* 6.3  1.2* 9.1  1.2*   EOS 0.1 0.2 0.3     Recent Labs   Lab 11/03/24  0553 11/04/24  0822 11/07/24  0138 11/08/24  0703 11/09/24  0417   *   < > 130* 130* 132*   K 4.4   < > 4.3 4.5 4.2   CL 97   < > 98 95 95   CO2 25   < > 20* 22* 25   BUN 30*   < > 17 24* 22*   CREATININE 3.3*   < > 2.7* 3.8* 3.0*   *   < > 260* 128* 213*   CALCIUM 7.5*   < > 8.1* 8.5* 8.4*   ALBUMIN 1.6*   < > 1.8* 1.8* 1.8*   MG 1.8  --   --   --   --    PHOS 2.9   < > 2.8 4.7* 3.1    < > = values in this interval not displayed.     Recent Labs   Lab 06/12/24  0837 06/22/24  0921 09/30/24  0840 10/29/24  0856   PROCAL 0.93*  --   --   --    LACTATE  --   --  0.8 1.6   FERRITIN  --  757*  --   --      Results for orders placed or performed during the hospital encounter of 10/25/24   Vitamin D    Collection Time: 10/28/24  5:29 AM   Result Value Ref Range    Vit D, 25-Hydroxy 9 (L) 30 - 96 ng/mL     SARS-CoV2 (COVID-19) Qualitative PCR (no units)   Date Value   10/29/2024 Detected (A)   12/22/2023 Presumptive Negative   05/03/2021 Not Detected   04/26/2021 Not Detected   09/05/2020 Not Detected     SARS-CoV-2 RNA, Amplification, Qual (no units)   Date Value   02/05/2024 Negative   11/20/2023 Negative   08/11/2022 Negative    01/24/2022 Negative   05/17/2021 Negative   03/09/2021 Negative   03/02/2021 Negative   08/17/2020 Negative   07/17/2020 Negative     POC Rapid COVID (no units)   Date Value   03/12/2022 Negative   01/31/2022 Negative   01/17/2022 Negative   12/21/2021 Negative   03/23/2021 Negative     Results for orders placed during the hospital encounter of 10/28/24    Echo Saline Bubble? No; Ultrasound enhancing contrast? No    Interpretation Summary    Left Ventricle: The left ventricle is normal in size. Mildly increased wall thickness. Regional wall motion abnormalities present. Septal motion is abnormal is consistent with bundle branch block.    Right Ventricle: Severe right ventricular enlargement. Systolic function is severely reduced.    Left Atrium: Atrial septum is bulging to the left.    Right Atrium: Right atrium is severely dilated.    Aortic Valve: The aortic valve is a trileaflet valve. There is mild aortic valve sclerosis. There is mild aortic regurgitation with a centrally directed jet.    Mitral Valve: There is mild regurgitation with a centrally directed jet.    Tricuspid Valve: There is severe regurgitation with a centrally directed jet.    Pulmonary Artery: No pulmonary hypertension. The estimated pulmonary artery systolic pressure is 26 mmHg.    IVC/SVC: Elevated venous pressure at 15 mmHg.      Echo Saline Bubble? No; Ultrasound enhancing contrast? No    Left Ventricle: The left ventricle is normal in size. Mildly increased   wall thickness. Regional wall motion abnormalities present. Septal motion   is abnormal is consistent with bundle branch block.    Right Ventricle: Severe right ventricular enlargement. Systolic   function is severely reduced.    Left Atrium: Atrial septum is bulging to the left.    Right Atrium: Right atrium is severely dilated.    Aortic Valve: The aortic valve is a trileaflet valve. There is mild   aortic valve sclerosis. There is mild aortic regurgitation with a   centrally  directed jet.    Mitral Valve: There is mild regurgitation with a centrally directed   jet.    Tricuspid Valve: There is severe regurgitation with a centrally   directed jet.    Pulmonary Artery: No pulmonary hypertension. The estimated pulmonary   artery systolic pressure is 26 mmHg.    IVC/SVC: Elevated venous pressure at 15 mmHg.      Labs and Imaging listed above were reviewed.     Assessment/Plan:      * Cellulitis of left thigh  L AKA- necrotic amputation surgical site- developing eschar 9x15x0.2cm and blisters to periwound  Continue IV antibiotics   General surgery consulted for wound debridement. Recs: continue recommendations per Wound Care   - Aquacel AG daily to L AKA    ID recs:   Continue vancomycin/ piperacillin tazobactam, can be switched to Augmentin and doxy on discharge for total of 14 days  Given one dose of metronidazole.  Difficult to determine length of therapy at this time but related to treatment response. Unfortunately, the aspect of the ischemic lesions complicate assessment of recovery.  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  Patient is status post CABG as well as stents that have clotted in her lower extremities    Pain control-- continue current regimen as BP allows  Cautious with opioids secondary to bradypnea / decreased respiratory rate    Pressure injury of sacral region, unstageable  Wound care    - Pressure injury prevention interventions   - Triad ointment BID to sacrum/buttocks    Morbid obesity with BMI of 50.0-59.9, adult  Body mass index is 47.9 kg/m². Morbid obesity complicates all aspects of disease management from diagnostic modalities to treatment.     ESRD (end stage renal disease)  Nephrology consulted for HD.    S/P AKA (above knee amputation) bilateral  Fall precautions     Septic shock  This patient had shock. The type of shock is distributive due to sepsis. She was admitted to the intensive care unit 10/29.   Patient did not require IVF  resuscitation or pressors. BP recovered.  Stepped down 10/31.   Continue IV abx. For 14 days total  Can switch to po Augmentin and doxycyline on discharge     Chronic combined systolic and diastolic congestive heart failure  Patient has Combined Systolic and Diastolic heart failure that is Chronic. On presentation their CHF was well compensated.     Latest ECHO  Results for orders placed during the hospital encounter of 05/07/24    Echo    Interpretation Summary    Left Ventricle: The left ventricle is mildly dilated. Ventricular mass is normal. Normal wall thickness. Severe global hypokinesis present. There is severely reduced systolic function. Ejection fraction by visual approximation is 15%. There is diastolic dysfunction.    Right Ventricle: Severe right ventricular enlargement. Wall thickness is normal. Right ventricle wall motion has global hypokinesis. Systolic function is mildly reduced.    Left Atrium: Left atrium is severely dilated. There is an atrial septal aneurysm.    Right Atrium: Right atrium is dilated.    Aortic Valve: There is mild aortic regurgitation.    Mitral Valve: There is mild regurgitation.    Tricuspid Valve: There is annular dilation present. There is normal leaflet mobility. There is severe functional regurgitation.    Pulmonary Artery: The estimated pulmonary artery systolic pressure is at least 24 mmHg. PASP is likely under-estimated in the setting of severe tricuspid regurgitation.    IVC/SVC: Central venous pressure of at least 8 mmHg.    No vegetation seen.    Current Heart Failure Medications       - Monitor strict I&Os and daily weights.    - Place on telemetry  - Low sodium diet  - Place on fluid restriction of 1 L.   - Cardiology has not been consulted  - The patient's volume status is stable but not at their baseline as indicated by edema- UF with HD as tolerated.   - Lasix DC'd 10/30 due to low BP    Anemia due to chronic kidney disease, on chronic dialysis  Anemia is likely  due to acute blood loss which was from bleeding from L stump wound . Most recent hemoglobin and hematocrit are listed below.  Recent Labs     11/07/24  0138 11/07/24  1732 11/08/24  0703   HGB 8.3* 7.7* 8.1*   HCT 26.4* 23.5* 25.2*     - Monitor serial CBC   - Transfuse PRBC if patient becomes hemodynamically unstable, symptomatic or H/H drops below 7/21.  - Patient has received 3 units of PRBCs on 11/3  and one unit on 11/6  - Patient's anemia is currently stable  - EGD without identifiable source of bleeding  - H&H stable    Peripheral vascular disease  On Eliquis / Plavix--- held due to bleeding from the stump which resolved and possible GI bleed but EGD did not show any source of bleeding; just friable gastric mucosa  resumed Plavix and monitoring before resuming apixaban      Social Drivers of Health with Concerns     Tobacco Use: Medium Risk (10/25/2024)    Patient History     Smoking Tobacco Use: Former     Smokeless Tobacco Use: Never     Passive Exposure: Not on file   Financial Resource Strain: Medium Risk (10/29/2024)    Overall Financial Resource Strain (CARDIA)     Difficulty of Paying Living Expenses: Somewhat hard   Physical Activity: Inactive (10/29/2024)    Exercise Vital Sign     Days of Exercise per Week: 0 days     Minutes of Exercise per Session: 0 min   Stress: Stress Concern Present (10/29/2024)    [employer] - Occupational Stress Questionnaire     Feeling of Stress : To some extent   Health Literacy: Adequate Health Literacy (10/29/2024)     Health Literacy     Frequency of need for help with medical instructions: Rarely   Recent Concern: Health Literacy - Inadequate Health Literacy (10/28/2024)     Health Literacy     Frequency of need for help with medical instructions: Sometimes       Active Hospital Problems    Diagnosis  POA    *Cellulitis of left thigh [L03.116]  Yes    Pressure injury of sacral region, unstageable [L89.150]  Yes    Morbid obesity  with BMI of 50.0-59.9, adult [E66.01, Z68.43]  Not Applicable    ESRD (end stage renal disease) [N18.6]  Yes    S/P AKA (above knee amputation) bilateral [Z89.611, Z89.612]  Not Applicable    Septic shock [A41.9, R65.21]  Yes    Chronic combined systolic and diastolic congestive heart failure [I50.42]  Yes    Anemia due to chronic kidney disease, on chronic dialysis [N18.6, D63.1, Z99.2]  Not Applicable    Peripheral vascular disease [I73.9]  Yes      Resolved Hospital Problems    Diagnosis Date Resolved POA    Metabolic acidosis [E87.20] 10/29/2024 Yes       Inpatient Medications Prescribed for Management of Current Problems:     Scheduled Meds:   amoxicillin-clavulanate 500-125mg  1 tablet Oral BID    atorvastatin  80 mg Oral Daily    calcitRIOL  0.5 mcg Oral Daily    cinacalcet  30 mg Oral Every Mon, Wed, Fri    doxycycline  100 mg Oral Q12H    insulin glargine U-100  5 Units Subcutaneous QHS    pantoprazole  40 mg Oral Daily    pregabalin  50 mg Oral Daily    sertraline  100 mg Oral Daily    sodium bicarbonate  650 mg Oral TID    vitamin renal formula (B-complex-vitamin c-folic acid)  1 capsule Oral Daily     Continuous Infusions:  PRN Meds:.  Current Facility-Administered Medications:     0.9%  NaCl infusion (for blood administration), , Intravenous, Q24H PRN    0.9%  NaCl infusion (for blood administration), , Intravenous, Q24H PRN    0.9% NaCl, , Intravenous, PRN    acetaminophen, 650 mg, Oral, Q4H PRN    dextrose 10%, 12.5 g, Intravenous, PRN    dextrose 10%, 25 g, Intravenous, PRN    glucagon (human recombinant), 1 mg, Intramuscular, PRN    glucose, 16 g, Oral, PRN    glucose, 24 g, Oral, PRN    heparin (porcine), 3,600 Units, Intravenous, PRN    insulin aspart U-100, 0-10 Units, Subcutaneous, QID (AC + HS) PRN    melatonin, 6 mg, Oral, Nightly PRN    naloxone, 0.02 mg, Intravenous, PRN    ondansetron, 4 mg, Intravenous, Q8H PRN    oxyCODONE, 10 mg, Oral, Q12H PRN    polyethylene glycol, 17 g, Oral, PRN     senna-docusate 8.6-50 mg, 1 tablet, Oral, BID PRN    sodium chloride 0.9%, 250 mL, Intravenous, PRN    sodium chloride 0.9%, 10 mL, Intravenous, Q8H PRN    VTE Risk Mitigation (From admission, onward)           Ordered     IP VTE HIGH RISK PATIENT  Once         10/28/24 2114     Place sequential compression device  Until discontinued         10/28/24 2114     Reason for No Pharmacological VTE Prophylaxis  Once        Question:  Reasons:  Answer:  Already adequately anticoagulated on oral Anticoagulants    10/28/24 2114     heparin (porcine) injection 3,600 Units  As needed (PRN)         10/28/24 2114                  I have completed this tele-visit without the assistance of a telepresenter.    The attending portion of this evaluation, treatment, and documentation was performed per Aurora Feliciano MD via Telemedicine AudioVisual using the secure [employer] software platform with 2 way audio/video. The provider was located off-site and the patient is located in the hospital. The aforementioned video software was utilized to document the relevant history and physical exam    I spent a total of 51 minutes on the day of the visit.This includes face to face time and non-face to face time preparing to see the patient (eg, review of tests), obtaining and/or reviewing separately obtained history, documenting clinical information in the electronic or other health record, independently interpreting results and communicating results to the patient/family/caregiver, or care coordinator.      Aurora Feliciano MD  Department of Hospital Medicine   [facility]

## 2024-11-10 NOTE — ASSESSMENT & PLAN NOTE
On Eliquis / Plavix--- held due to bleeding from the stump which resolved and possible GI bleed but EGD did not show any source of bleeding; just friable gastric mucosa  resumed Plavix and monitoring before resuming apixaban

## 2024-11-10 NOTE — ASSESSMENT & PLAN NOTE
L AKA- necrotic amputation surgical site- developing eschar 9x15x0.2cm and blisters to periwound  Continue IV antibiotics   General surgery consulted for wound debridement. Recs: continue recommendations per Wound Care   - Aquacel AG daily to L AKA    ID recs:   Continue vancomycin/ piperacillin tazobactam, can be switched to Augmentin and doxy on discharge for total of 14 days  Given one dose of metronidazole.  Difficult to determine length of therapy at this time but related to treatment response. Unfortunately, the aspect of the ischemic lesions complicate assessment of recovery.  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  Patient is status post CABG as well as stents that have clotted in her lower extremities    Pain control-- continue current regimen as BP allows  Cautious with opioids secondary to bradypnea / decreased respiratory rate

## 2024-11-10 NOTE — SUBJECTIVE & OBJECTIVE
Interval History: Virtual follow up visit for suspected Leg wound, left [S83.082I]  Wound infection [T14.8XXA, L08.9] present on admission.   This service was provided by telemedicine.    The patient location is: [facility]   Admitted 10/28/2024  9:02 PM    CC: L thigh cellulitis    The patient is able to provide adequate history. History was obtained from patient and past medical records.   No significant events overnight reported.  Patient complains of severe phantom LE pain.      Data  Details     [x]   Lab results reviewed 11/10/2024  BGs > goal but improved. Hyponatremia. Phos = 3.1.    []   Micro reports reviewed 11/10/2024      []   Pathology reports reviewed 11/10/2024     []   Imaging reports reviewed 11/10/2024     []   Cardiology Procedure reports reviewed 11/10/2024     []   Non-HM records/CareEverywhere notes reviewed 11/10/2024      []  Tests/studies orders placed or verified 11/10/2024       [x]  Independently viewed/assessed 11/10/2024  Corrected sCa = 10.2    [x]  11/10/2024 Discussion of:  Limb pain with Pharmacist     Review of Systems   Constitutional:  Positive for activity change. Negative for fever.   Respiratory:  Negative for shortness of breath.      Objective:     Vital Signs (Most Recent):  Temp: 98.3 °F (36.8 °C) (11/10/24 1103)  Pulse: 86 (11/10/24 1103)  Resp: 18 (11/10/24 1103)  BP: 131/60 (11/10/24 1103)  SpO2: (!) 94 % (11/10/24 1103) Vital Signs (24h Range):  Temp:  [98 °F (36.7 °C)-99.5 °F (37.5 °C)] 98.3 °F (36.8 °C)  Pulse:  [] 86  Resp:  [16-18] 18  SpO2:  [93 %-100 %] 94 %  BP: (120-133)/(54-64) 131/60     Weight: 71.2 kg (156 lb 15.5 oz)  Body mass index is 47.9 kg/m².  No intake or output data in the 24 hours ending 11/10/24 1219     Physical Exam  Constitutional:       General: She is awake.   Cardiovascular:      Comments: Monitor and/or Vital signs reviewed at time of visit  Pulmonary:      Effort: Pulmonary effort is normal. No accessory muscle usage or  respiratory distress.   Musculoskeletal:      Right Lower Extremity: Right leg is amputated above knee.      Left Lower Extremity: Left leg is amputated above knee.   Neurological:      Mental Status: She is alert. She is not disoriented.   Psychiatric:         Attention and Perception: Attention normal.         Behavior: Behavior is cooperative.         Significant Labs:   Recent Labs   Lab 07/08/24  0000 10/25/24  1501   HGBA1C 6.4* 5.9*     Recent Labs   Lab 11/09/24  2033 11/10/24  0534 11/10/24  1106   POCTGLUCOSE 224* 155* 198*     Recent Labs   Lab 10/29/24  1031   TSH 3.304     Recent Labs   Lab 11/07/24  0138 11/07/24  1732 11/08/24  0703   WBC 14.28* 12.67 11.75   HGB 8.3* 7.7* 8.1*   HCT 26.4* 23.5* 25.2*    319 397     Recent Labs   Lab 11/06/24  1949 11/07/24  1732   GRAN 86.1*  16.3* 78.4*  9.9*   LYMPH 4.8*  0.9* 8.2*  1.0   MONO 6.3  1.2* 9.1  1.2*   EOS 0.2 0.3     Recent Labs   Lab 11/07/24  0138 11/08/24  0703 11/09/24  0417   * 130* 132*   K 4.3 4.5 4.2   CL 98 95 95   CO2 20* 22* 25   BUN 17 24* 22*   CREATININE 2.7* 3.8* 3.0*   * 128* 213*   CALCIUM 8.1* 8.5* 8.4*   ALBUMIN 1.8* 1.8* 1.8*   PHOS 2.8 4.7* 3.1     Recent Labs   Lab 06/12/24  0837 06/22/24  0921 09/30/24  0840 10/29/24  0856   PROCAL 0.93*  --   --   --    LACTATE  --   --  0.8 1.6   FERRITIN  --  757*  --   --      Results for orders placed or performed during the hospital encounter of 10/25/24   Vitamin D    Collection Time: 10/28/24  5:29 AM   Result Value Ref Range    Vit D, 25-Hydroxy 9 (L) 30 - 96 ng/mL     SARS-CoV2 (COVID-19) Qualitative PCR (no units)   Date Value   10/29/2024 Detected (A)   12/22/2023 Presumptive Negative   05/03/2021 Not Detected   04/26/2021 Not Detected   09/05/2020 Not Detected     SARS-CoV-2 RNA, Amplification, Qual (no units)   Date Value   02/05/2024 Negative   11/20/2023 Negative   08/11/2022 Negative   01/24/2022 Negative   05/17/2021 Negative   03/09/2021 Negative    03/02/2021 Negative   08/17/2020 Negative   07/17/2020 Negative     POC Rapid COVID (no units)   Date Value   03/12/2022 Negative   01/31/2022 Negative   01/17/2022 Negative   12/21/2021 Negative   03/23/2021 Negative     Results for orders placed during the hospital encounter of 10/28/24    Echo Saline Bubble? No; Ultrasound enhancing contrast? No    Interpretation Summary    Left Ventricle: The left ventricle is normal in size. Mildly increased wall thickness. Regional wall motion abnormalities present. Septal motion is abnormal is consistent with bundle branch block.    Right Ventricle: Severe right ventricular enlargement. Systolic function is severely reduced.    Left Atrium: Atrial septum is bulging to the left.    Right Atrium: Right atrium is severely dilated.    Aortic Valve: The aortic valve is a trileaflet valve. There is mild aortic valve sclerosis. There is mild aortic regurgitation with a centrally directed jet.    Mitral Valve: There is mild regurgitation with a centrally directed jet.    Tricuspid Valve: There is severe regurgitation with a centrally directed jet.    Pulmonary Artery: No pulmonary hypertension. The estimated pulmonary artery systolic pressure is 26 mmHg.    IVC/SVC: Elevated venous pressure at 15 mmHg.      Echo Saline Bubble? No; Ultrasound enhancing contrast? No    Left Ventricle: The left ventricle is normal in size. Mildly increased   wall thickness. Regional wall motion abnormalities present. Septal motion   is abnormal is consistent with bundle branch block.    Right Ventricle: Severe right ventricular enlargement. Systolic   function is severely reduced.    Left Atrium: Atrial septum is bulging to the left.    Right Atrium: Right atrium is severely dilated.    Aortic Valve: The aortic valve is a trileaflet valve. There is mild   aortic valve sclerosis. There is mild aortic regurgitation with a   centrally directed jet.    Mitral Valve: There is mild regurgitation with a  centrally directed   jet.    Tricuspid Valve: There is severe regurgitation with a centrally   directed jet.    Pulmonary Artery: No pulmonary hypertension. The estimated pulmonary   artery systolic pressure is 26 mmHg.    IVC/SVC: Elevated venous pressure at 15 mmHg.      Labs and Imaging listed above were reviewed.

## 2024-11-11 LAB
ALBUMIN SERPL BCP-MCNC: 1.8 G/DL (ref 3.5–5.2)
ANION GAP SERPL CALC-SCNC: 14 MMOL/L (ref 8–16)
BUN SERPL-MCNC: 51 MG/DL (ref 6–20)
CALCIUM SERPL-MCNC: 8.7 MG/DL (ref 8.7–10.5)
CHLORIDE SERPL-SCNC: 92 MMOL/L (ref 95–110)
CO2 SERPL-SCNC: 25 MMOL/L (ref 23–29)
CREAT SERPL-MCNC: 5.4 MG/DL (ref 0.5–1.4)
ERYTHROCYTE [DISTWIDTH] IN BLOOD BY AUTOMATED COUNT: 19.3 % (ref 11.5–14.5)
EST. GFR  (NO RACE VARIABLE): 9 ML/MIN/1.73 M^2
GLUCOSE SERPL-MCNC: 96 MG/DL (ref 70–110)
HCT VFR BLD AUTO: 24 % (ref 37–48.5)
HGB BLD-MCNC: 7.7 G/DL (ref 12–16)
MCH RBC QN AUTO: 31.6 PG (ref 27–31)
MCHC RBC AUTO-ENTMCNC: 32.1 G/DL (ref 32–36)
MCV RBC AUTO: 98 FL (ref 82–98)
PHOSPHATE SERPL-MCNC: 4.1 MG/DL (ref 2.7–4.5)
PLATELET # BLD AUTO: 386 K/UL (ref 150–450)
PMV BLD AUTO: 11.4 FL (ref 9.2–12.9)
POCT GLUCOSE: 153 MG/DL (ref 70–110)
POCT GLUCOSE: 165 MG/DL (ref 70–110)
POCT GLUCOSE: 208 MG/DL (ref 70–110)
POCT GLUCOSE: 219 MG/DL (ref 70–110)
POTASSIUM SERPL-SCNC: 4.5 MMOL/L (ref 3.5–5.1)
RBC # BLD AUTO: 2.44 M/UL (ref 4–5.4)
SODIUM SERPL-SCNC: 131 MMOL/L (ref 136–145)
WBC # BLD AUTO: 14.59 K/UL (ref 3.9–12.7)

## 2024-11-11 PROCEDURE — 80100016 HC MAINTENANCE HEMODIALYSIS: Mod: HCNC

## 2024-11-11 PROCEDURE — 94660 CPAP INITIATION&MGMT: CPT | Mod: HCNC

## 2024-11-11 PROCEDURE — 99900035 HC TECH TIME PER 15 MIN (STAT): Mod: HCNC

## 2024-11-11 PROCEDURE — 25000003 PHARM REV CODE 250: Mod: HCNC | Performed by: INTERNAL MEDICINE

## 2024-11-11 PROCEDURE — 97535 SELF CARE MNGMENT TRAINING: CPT | Mod: HCNC,CO

## 2024-11-11 PROCEDURE — 25000003 PHARM REV CODE 250: Mod: HCNC | Performed by: STUDENT IN AN ORGANIZED HEALTH CARE EDUCATION/TRAINING PROGRAM

## 2024-11-11 PROCEDURE — 11000001 HC ACUTE MED/SURG PRIVATE ROOM: Mod: HCNC

## 2024-11-11 PROCEDURE — 25000003 PHARM REV CODE 250: Mod: HCNC

## 2024-11-11 PROCEDURE — 63600175 PHARM REV CODE 636 W HCPCS: Mod: JZ,JG,HCNC | Performed by: INTERNAL MEDICINE

## 2024-11-11 PROCEDURE — 85027 COMPLETE CBC AUTOMATED: CPT | Mod: HCNC | Performed by: STUDENT IN AN ORGANIZED HEALTH CARE EDUCATION/TRAINING PROGRAM

## 2024-11-11 PROCEDURE — 36415 COLL VENOUS BLD VENIPUNCTURE: CPT | Mod: HCNC | Performed by: STUDENT IN AN ORGANIZED HEALTH CARE EDUCATION/TRAINING PROGRAM

## 2024-11-11 PROCEDURE — 94761 N-INVAS EAR/PLS OXIMETRY MLT: CPT | Mod: HCNC

## 2024-11-11 PROCEDURE — 80069 RENAL FUNCTION PANEL: CPT | Mod: HCNC | Performed by: STUDENT IN AN ORGANIZED HEALTH CARE EDUCATION/TRAINING PROGRAM

## 2024-11-11 RX ORDER — CARVEDILOL 6.25 MG/1
3.12 TABLET ORAL 2 TIMES DAILY
Status: ON HOLD
Start: 2024-11-11 | End: 2025-11-11

## 2024-11-11 RX ORDER — AMOXICILLIN AND CLAVULANATE POTASSIUM 500; 125 MG/1; MG/1
1 TABLET, FILM COATED ORAL 2 TIMES DAILY
Status: ON HOLD
Start: 2024-11-11 | End: 2024-11-16

## 2024-11-11 RX ORDER — PREGABALIN 75 MG/1
75 CAPSULE ORAL DAILY
Qty: 7 CAPSULE | Refills: 0 | Status: SHIPPED | OUTPATIENT
Start: 2024-11-11 | End: 2024-11-12

## 2024-11-11 RX ORDER — INSULIN ASPART 100 [IU]/ML
0-10 INJECTION, SOLUTION INTRAVENOUS; SUBCUTANEOUS
Status: ON HOLD
Start: 2024-11-11 | End: 2025-11-11

## 2024-11-11 RX ORDER — CINACALCET 30 MG/1
30 TABLET, FILM COATED ORAL
Status: ON HOLD
Start: 2024-11-13 | End: 2025-11-13

## 2024-11-11 RX ORDER — INSULIN GLARGINE 100 [IU]/ML
5 INJECTION, SOLUTION SUBCUTANEOUS NIGHTLY
Status: ON HOLD
Start: 2024-11-11 | End: 2025-11-11

## 2024-11-11 RX ORDER — ACETAMINOPHEN 325 MG/1
650 TABLET ORAL EVERY 8 HOURS PRN
Status: ON HOLD
Start: 2024-11-11

## 2024-11-11 RX ORDER — PREGABALIN 50 MG/1
50 CAPSULE ORAL 2 TIMES DAILY
Start: 2024-11-11 | End: 2024-11-11

## 2024-11-11 RX ORDER — FAMOTIDINE 20 MG/1
20 TABLET, FILM COATED ORAL DAILY
Status: ON HOLD
Start: 2024-11-11

## 2024-11-11 RX ORDER — OXYCODONE AND ACETAMINOPHEN 5; 325 MG/1; MG/1
1 TABLET ORAL EVERY 8 HOURS PRN
Qty: 10 TABLET | Refills: 0 | Status: SHIPPED | OUTPATIENT
Start: 2024-11-11 | End: 2024-11-12

## 2024-11-11 RX ORDER — DOXYCYCLINE HYCLATE 100 MG
100 TABLET ORAL EVERY 12 HOURS
Status: ON HOLD
Start: 2024-11-11 | End: 2024-11-16

## 2024-11-11 RX ADMIN — SODIUM BICARBONATE 650 MG TABLET 650 MG: at 03:11

## 2024-11-11 RX ADMIN — INSULIN ASPART 4 UNITS: 100 INJECTION, SOLUTION INTRAVENOUS; SUBCUTANEOUS at 06:11

## 2024-11-11 RX ADMIN — INSULIN ASPART 2 UNITS: 100 INJECTION, SOLUTION INTRAVENOUS; SUBCUTANEOUS at 06:11

## 2024-11-11 RX ADMIN — ATORVASTATIN CALCIUM 80 MG: 40 TABLET, FILM COATED ORAL at 08:11

## 2024-11-11 RX ADMIN — PREGABALIN 50 MG: 50 CAPSULE ORAL at 08:11

## 2024-11-11 RX ADMIN — CINACALCET HYDROCHLORIDE 30 MG: 30 TABLET, FILM COATED ORAL at 08:11

## 2024-11-11 RX ADMIN — OXYCODONE HYDROCHLORIDE 10 MG: 10 TABLET, FILM COATED, EXTENDED RELEASE ORAL at 03:11

## 2024-11-11 RX ADMIN — Medication 1 CAPSULE: at 08:11

## 2024-11-11 RX ADMIN — SODIUM BICARBONATE 650 MG TABLET 650 MG: at 08:11

## 2024-11-11 RX ADMIN — DOXYCYCLINE HYCLATE 100 MG: 100 TABLET, COATED ORAL at 08:11

## 2024-11-11 RX ADMIN — SERTRALINE HYDROCHLORIDE 100 MG: 100 TABLET ORAL at 08:11

## 2024-11-11 RX ADMIN — SODIUM BICARBONATE 650 MG TABLET 650 MG: at 09:11

## 2024-11-11 RX ADMIN — PANTOPRAZOLE SODIUM 40 MG: 40 TABLET, DELAYED RELEASE ORAL at 08:11

## 2024-11-11 RX ADMIN — AMOXICILLIN AND CLAVULANATE POTASSIUM 500 MG: 500; 125 TABLET, FILM COATED ORAL at 09:11

## 2024-11-11 RX ADMIN — AMOXICILLIN AND CLAVULANATE POTASSIUM 500 MG: 500; 125 TABLET, FILM COATED ORAL at 08:11

## 2024-11-11 RX ADMIN — PREGABALIN 50 MG: 50 CAPSULE ORAL at 09:11

## 2024-11-11 RX ADMIN — INSULIN GLARGINE 5 UNITS: 100 INJECTION, SOLUTION SUBCUTANEOUS at 09:11

## 2024-11-11 RX ADMIN — EPOETIN ALFA-EPBX 10000 UNITS: 10000 INJECTION, SOLUTION INTRAVENOUS; SUBCUTANEOUS at 11:11

## 2024-11-11 RX ADMIN — DOXYCYCLINE HYCLATE 100 MG: 100 TABLET, COATED ORAL at 09:11

## 2024-11-11 RX ADMIN — INSULIN ASPART 2 UNITS: 100 INJECTION, SOLUTION INTRAVENOUS; SUBCUTANEOUS at 09:11

## 2024-11-11 RX ADMIN — CALCITRIOL CAPSULES 0.25 MCG 0.5 MCG: 0.25 CAPSULE ORAL at 08:11

## 2024-11-11 NOTE — CONSULTS
[facility] - Telemetry  Adult Nutrition  Consult Note    SUMMARY     Recommendations    Recommendation:  1. Add Geremias BID to promote wound healing.   2. Encourage intake of meals & supplements as tolerated.   3. Monitor weight/labs.   4. RD to continue to follow to monitor po intake    Goals:  Pt will meet 75% of EEN/EPN by RD follow up  Nutrition Goal Status: progressing towards goal  Communication of RD Recs: other (comment) (POC)    Assessment and Plan   Nutrition Problem  Inadequate energy intake      Related to (etiology):   Accessibility barriers      Signs and Symptoms (as evidenced by):   S/p BL AKA  Pt is unable to care for herself   Social problems      Interventions:  Collaboration by nutrition professionals with other providers   [employer] medical food supplement therapy- Novasource   Renal Diet      Nutrition Diagnosis Status:   Continues    Malnutrition Assessment  Unable to assess NFPE 2/2 COVID isolation      Weight Loss (Malnutrition):  (11%x 5 months)     Reason for Assessment  Reason For Assessment: consult (poor wound healing)  Diagnosis: other (see comments) (cellulitis of left thigh)  General Information Comments: Pt on 2000 calorie ADA Renal diet with 1000ml fluid restriction with Novasource Renal. Noted ~50% intake at meals. Receives HD. B AKA. s/p EGD 11/7. Carlos 14- sacral spine and L thigh wounds. Noted 22lb weight loss x 5 months. Unable to assess NFPE 2/2 pt on COVID isolation.  Nutrition Discharge Planning: pt to d/c on Renal ADA diet    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    Osteomyelitis     Osteomyelitis of left foot 04/30/2021    Ulcer of left foot     Vaginal delivery     x1        Nutrition Risk  Screen  Nutrition Risk Screen: no indicators present    Nutrition/Diet History  Patient Reported Diet/Restrictions/Preferences: diabetic diet, renal  Food Preferences: RJ  Spiritual, Cultural Beliefs, Gnosticist Practices, Values that Affect Care: no  Factors Affecting Nutritional Intake: decreased appetite    Anthropometrics  Temp: 98.8 °F (37.1 °C)  Height Method: Stated  Height: 4' (121.9 cm)  Height (inches): 48 in  Weight Method: Standard Scale  Weight: 70.8 kg (156 lb 1.4 oz)  Weight (lb): 156.09 lb  Ideal Body Weight (IBW), Female: 40 lb  % Ideal Body Weight, Female (lb): 390.23 %  BMI (Calculated): 47.6  BMI Grade: greater than 40 - morbid obesity  Usual Body Weight (UBW), k.3 kg ()  % Usual Body Weight: 88.35  % Weight Change From Usual Weight: -11.83 %  Amputation %: 16, 16  Total Amputation %: 32  Amputation Ideal Body Weight (IBW), Female (lb): 8 lb  Amputee BMI (kg/m2): 70.24 kg/m2     Lab/Procedures/Meds  Pertinent Labs Reviewed: reviewed  Pertinent Labs Comments: Na 132L, BUN 22H, Crea 3.0H, Glu 213H, Ca 8.4L, Alb 1.8L  Pertinent Medications Reviewed: reviewed  Pertinent Medications Comments: amoxicillin, calcitriol, insulin, pantoprazole, renal vitamin    Estimated/Assessed Needs  Weight Used For Calorie Calculations: 70.8 kg (156 lb 1.4 oz)  Energy Calorie Requirements (kcal): 3179-4321 (25-30 kcal/kg with amputation %)  Energy Need Method: Kcal/kg  Protein Requirements: 68g (1.4g/kg with amputation%)  Weight Used For Protein Calculations: 70.8 kg (156 lb 1.4 oz)  Estimated Fluid Requirement Method: RDA Method  RDA Method (mL): 1197  CHO Requirement: 180g    Nutrition Prescription Ordered  Current Diet Order: 2000 calorie ADA Renal 1000ml fluid  Oral Nutrition Supplement: Novasource Renal    Evaluation of Received Nutrient/Fluid Intake  I/O: 150/2500  Energy Calories Required: not meeting needs  Protein Required: not meeting needs  Fluid Required: not meeting needs  Comments: LBM  11/10  Tolerance: not tolerating  % Intake of Estimated Energy Needs: 25 - 50 %  % Meal Intake: 25 - 50 %    Nutrition Risk  Level of Risk/Frequency of Follow-up: low (1x/week)     Monitor and Evaluation  Food and Nutrient Intake: energy intake, food and beverage intake  Food and Nutrient Adminstration: diet order  Anthropometric Measurements: weight change, body mass index  Biochemical Data, Medical Tests and Procedures: electrolyte and renal panel, gastrointestinal profile, inflammatory profile, lipid profile, glucose/endocrine profile     Nutrition Related Social Determinants of Health: SDOH: Unable to assess at this time.      Nutrition Follow-Up  RD Follow-up?: Yes

## 2024-11-11 NOTE — PLAN OF CARE
0730  (-) Hep B result faxed to [facility] (f [phone]). CM informed nurse Mina of PPD result that needs to be documented. Awaiting response.     0750  Message sent to [facility] requesting admission paperwork for the pt to complete. Awaiting response.     1015  (-) PPD documentation faxed to [facility] (f [phone]). Awaiting response.     1025  Pt on the phone with Mayco edwards/[facility] to assist the pt in completing the admission paperwork.     1100  CM was informed by Mayco that [facility] will come to the bedside to assist with pt in completing the admission paperwork.     1105  CM questioned Chris ([phone]) w/[facility] regarding requested HD chair transfer status. Chris stated that this CM will receive a call from Veronica. Awaiting response.     1300  CM was informed by Valery with [facility] that they are awaiting clinical acceptance from [facility] & that they will have a response in 2 1/2 hrs.     1515  CM was informed by Mayco that ins auth has been obtained but that [facility] still needs to meet with the pt at the bedside to complete admission paperwork, & that the pt will not be admitted until tomorrow.     1525  CM was informed by Janessa with [facility] that they are awaiting clinical acceptance.    Message sent to Dr Feliciano informing of above.     1530  CM informed the pt's cousin, Jen Arias ([phone]), via phone of above. Jen verbalized understanding.     Transportation packet left at the nurse's station.       Will continue to follow.

## 2024-11-11 NOTE — ASSESSMENT & PLAN NOTE
This patient had shock. The type of shock is distributive due to sepsis. She was admitted to the intensive care unit 10/29.   Patient did not require IVF resuscitation or pressors. BP recovered.  Stepped down 10/31.   Continue IV abx. For 14 days total  Changed to po Augmentin and doxycyline to complete course

## 2024-11-11 NOTE — PROGRESS NOTES
[facility] Medicine  Telemedicine Progress Note    Patient Name: Suyapa Connelly  MRN: [id number]  Patient Class: IP- Inpatient   Admission Date: 10/28/2024  Length of Stay: 13 days  Attending Physician: Aurora Feliciano MD  Primary Care Provider: Vanessa Noel MD    Subjective:     Principal Problem:Cellulitis of left thigh    HPI:  Patient is a 59-year-old female with a known past medical history of hypertension, hyperlipidemia, diabetes mellitus type 2, ESRD on HD, and deconditioning that presents the ER after being on floor throughout the evening.     Patient presents after being found on the floor by a neighbor today.  Patient reports that she got out of bed to change her pad and diaper but was unable to get back into bed, staying on the floor throughout the evening.  Patient's  who normally cares for her, was arrested yesterday and is in detention and patient was unable to care for herself.  When patient was found by neighbor on floor, EMS was called and patient was brought to the ER for further evaluation.  On arrival to ER, patient in no acute distress.  Labs essentially within normal limits.  Patient last dialyzed on Thursday and no signs of fluid overload or electrolyte abnormalities.  Patient has no family NS able to assist her at home other than her  who is currently incarcerated.  Patient admitted for social problems and hemodialysis.    Patient due to her open wounds and CT finding of her left thigh - emphysema can not rule out infection -     Overview/Hospital Course:  No notes on file    Interval History: Virtual follow up visit for suspected Leg wound, left [S81.802A]  Wound infection [T14.8XXA, L08.9] present on admission.   This service was provided by telemedicine.    The patient location is: K404/K404 A   Admitted 10/28/2024  9:02 PM    CC: L thigh cellulitis    The patient is able to provide adequate history. History was obtained from patient and past  medical records.   No significant events overnight reported.  Patient complains of severe phantom LE pain.      Data  Details     [x]   Lab results reviewed 11/10/2024  BGs > goal but improved. Hyponatremia. Phos = 3.1.    []   Micro reports reviewed 11/10/2024      []   Pathology reports reviewed 11/10/2024     []   Imaging reports reviewed 11/10/2024     []   Cardiology Procedure reports reviewed 11/10/2024     []   Non- records/CareEverywhere notes reviewed 11/10/2024      []  Tests/studies orders placed or verified 11/10/2024       [x]  Independently viewed/assessed 11/10/2024  Corrected sCa = 10.2    [x]  11/10/2024 Discussion of:  Limb pain with Pharmacist     Review of Systems   Constitutional:  Positive for activity change. Negative for fever.   Respiratory:  Negative for shortness of breath.      Objective:     Vital Signs (Most Recent):  Temp: 98.3 °F (36.8 °C) (11/10/24 1103)  Pulse: 86 (11/10/24 1103)  Resp: 18 (11/10/24 1103)  BP: 131/60 (11/10/24 1103)  SpO2: (!) 94 % (11/10/24 1103) Vital Signs (24h Range):  Temp:  [98 °F (36.7 °C)-99.5 °F (37.5 °C)] 98.3 °F (36.8 °C)  Pulse:  [] 86  Resp:  [16-18] 18  SpO2:  [93 %-100 %] 94 %  BP: (120-133)/(54-64) 131/60     Weight: 71.2 kg (156 lb 15.5 oz)  Body mass index is 47.9 kg/m².  No intake or output data in the 24 hours ending 11/10/24 1219     Physical Exam  Constitutional:       General: She is awake.   Cardiovascular:      Comments: Monitor and/or Vital signs reviewed at time of visit  Pulmonary:      Effort: Pulmonary effort is normal. No accessory muscle usage or respiratory distress.   Musculoskeletal:      Right Lower Extremity: Right leg is amputated above knee.      Left Lower Extremity: Left leg is amputated above knee.   Neurological:      Mental Status: She is alert. She is not disoriented.   Psychiatric:         Attention and Perception: Attention normal.         Behavior: Behavior is cooperative.         Significant Labs:   Recent  Labs   Lab 07/08/24  0000 10/25/24  1501   HGBA1C 6.4* 5.9*     Recent Labs   Lab 11/09/24  2033 11/10/24  0534 11/10/24  1106   POCTGLUCOSE 224* 155* 198*     Recent Labs   Lab 10/29/24  1031   TSH 3.304     Recent Labs   Lab 11/07/24  0138 11/07/24  1732 11/08/24  0703   WBC 14.28* 12.67 11.75   HGB 8.3* 7.7* 8.1*   HCT 26.4* 23.5* 25.2*    319 397     Recent Labs   Lab 11/06/24  1949 11/07/24  1732   GRAN 86.1*  16.3* 78.4*  9.9*   LYMPH 4.8*  0.9* 8.2*  1.0   MONO 6.3  1.2* 9.1  1.2*   EOS 0.2 0.3     Recent Labs   Lab 11/07/24 0138 11/08/24  0703 11/09/24  0417   * 130* 132*   K 4.3 4.5 4.2   CL 98 95 95   CO2 20* 22* 25   BUN 17 24* 22*   CREATININE 2.7* 3.8* 3.0*   * 128* 213*   CALCIUM 8.1* 8.5* 8.4*   ALBUMIN 1.8* 1.8* 1.8*   PHOS 2.8 4.7* 3.1     Recent Labs   Lab 06/12/24  0837 06/22/24  0921 09/30/24  0840 10/29/24  0856   PROCAL 0.93*  --   --   --    LACTATE  --   --  0.8 1.6   FERRITIN  --  757*  --   --      Results for orders placed or performed during the hospital encounter of 10/25/24   Vitamin D    Collection Time: 10/28/24  5:29 AM   Result Value Ref Range    Vit D, 25-Hydroxy 9 (L) 30 - 96 ng/mL     SARS-CoV2 (COVID-19) Qualitative PCR (no units)   Date Value   10/29/2024 Detected (A)   12/22/2023 Presumptive Negative   05/03/2021 Not Detected   04/26/2021 Not Detected   09/05/2020 Not Detected     SARS-CoV-2 RNA, Amplification, Qual (no units)   Date Value   02/05/2024 Negative   11/20/2023 Negative   08/11/2022 Negative   01/24/2022 Negative   05/17/2021 Negative   03/09/2021 Negative   03/02/2021 Negative   08/17/2020 Negative   07/17/2020 Negative     POC Rapid COVID (no units)   Date Value   03/12/2022 Negative   01/31/2022 Negative   01/17/2022 Negative   12/21/2021 Negative   03/23/2021 Negative     Results for orders placed during the hospital encounter of 10/28/24    Echo Saline Bubble? No; Ultrasound enhancing contrast? No    Interpretation Summary    Left  Ventricle: The left ventricle is normal in size. Mildly increased wall thickness. Regional wall motion abnormalities present. Septal motion is abnormal is consistent with bundle branch block.    Right Ventricle: Severe right ventricular enlargement. Systolic function is severely reduced.    Left Atrium: Atrial septum is bulging to the left.    Right Atrium: Right atrium is severely dilated.    Aortic Valve: The aortic valve is a trileaflet valve. There is mild aortic valve sclerosis. There is mild aortic regurgitation with a centrally directed jet.    Mitral Valve: There is mild regurgitation with a centrally directed jet.    Tricuspid Valve: There is severe regurgitation with a centrally directed jet.    Pulmonary Artery: No pulmonary hypertension. The estimated pulmonary artery systolic pressure is 26 mmHg.    IVC/SVC: Elevated venous pressure at 15 mmHg.      Echo Saline Bubble? No; Ultrasound enhancing contrast? No    Left Ventricle: The left ventricle is normal in size. Mildly increased   wall thickness. Regional wall motion abnormalities present. Septal motion   is abnormal is consistent with bundle branch block.    Right Ventricle: Severe right ventricular enlargement. Systolic   function is severely reduced.    Left Atrium: Atrial septum is bulging to the left.    Right Atrium: Right atrium is severely dilated.    Aortic Valve: The aortic valve is a trileaflet valve. There is mild   aortic valve sclerosis. There is mild aortic regurgitation with a   centrally directed jet.    Mitral Valve: There is mild regurgitation with a centrally directed   jet.    Tricuspid Valve: There is severe regurgitation with a centrally   directed jet.    Pulmonary Artery: No pulmonary hypertension. The estimated pulmonary   artery systolic pressure is 26 mmHg.    IVC/SVC: Elevated venous pressure at 15 mmHg.      Labs and Imaging listed above were reviewed.       Assessment/Plan:      * Cellulitis of left thigh  L AKA- necrotic  amputation surgical site- developing eschar 9x15x0.2cm and blisters to periwound  Continue IV antibiotics   General surgery consulted for wound debridement. Recs: continue recommendations per Wound Care   - Aquacel AG daily to L AKA    ID recs:   Continue vancomycin/ piperacillin tazobactam, can be switched to Augmentin and doxy on discharge for total of 14 days  Given one dose of metronidazole.  Difficult to determine length of therapy at this time but related to treatment response. Unfortunately, the aspect of the ischemic lesions complicate assessment of recovery.  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  Patient is status post CABG as well as stents that have clotted in her lower extremities    Pain control-- continue current regimen as BP allows  Cautious with opioids secondary to bradypnea / decreased respiratory rate    Pressure injury of sacral region, unstageable  Wound care    - Pressure injury prevention interventions   - Triad ointment BID to sacrum/buttocks    Morbid obesity with BMI of 50.0-59.9, adult  Body mass index is 47.63 kg/m². Morbid obesity complicates all aspects of disease management from diagnostic modalities to treatment.     ESRD (end stage renal disease)  Nephrology consulted for HD.    S/P AKA (above knee amputation) bilateral  Fall precautions   Having phantom limb pain; increased Lyrica to BID    Septic shock  This patient had shock. The type of shock is distributive due to sepsis. She was admitted to the intensive care unit 10/29.   Patient did not require IVF resuscitation or pressors. BP recovered.  Stepped down 10/31.   Continue IV abx. For 14 days total  Changed to po Augmentin and doxycyline to complete course    Chronic combined systolic and diastolic congestive heart failure  Patient has Combined Systolic and Diastolic heart failure that is Chronic. On presentation their CHF was well compensated.     Latest ECHO  Results for orders placed during the hospital  encounter of 05/07/24    Echo    Interpretation Summary    Left Ventricle: The left ventricle is mildly dilated. Ventricular mass is normal. Normal wall thickness. Severe global hypokinesis present. There is severely reduced systolic function. Ejection fraction by visual approximation is 15%. There is diastolic dysfunction.    Right Ventricle: Severe right ventricular enlargement. Wall thickness is normal. Right ventricle wall motion has global hypokinesis. Systolic function is mildly reduced.    Left Atrium: Left atrium is severely dilated. There is an atrial septal aneurysm.    Right Atrium: Right atrium is dilated.    Aortic Valve: There is mild aortic regurgitation.    Mitral Valve: There is mild regurgitation.    Tricuspid Valve: There is annular dilation present. There is normal leaflet mobility. There is severe functional regurgitation.    Pulmonary Artery: The estimated pulmonary artery systolic pressure is at least 24 mmHg. PASP is likely under-estimated in the setting of severe tricuspid regurgitation.    IVC/SVC: Central venous pressure of at least 8 mmHg.    No vegetation seen.    Current Heart Failure Medications       - Monitor strict I&Os and daily weights.    - Place on telemetry  - Low sodium diet  - Place on fluid restriction of 1 L.   - Cardiology has not been consulted  - The patient's volume status is stable but not at their baseline as indicated by edema- UF with HD as tolerated.   - Lasix stopped 10/30 due to low BP    Anemia due to chronic kidney disease, on chronic dialysis  Anemia is likely due to acute blood loss which was from bleeding from L stump wound . Most recent hemoglobin and hematocrit are listed below.  Recent Labs     11/08/24  0703   HGB 8.1*   HCT 25.2*       - Monitor serial CBC   - Transfuse PRBC if patient becomes hemodynamically unstable, symptomatic or H/H drops below 7/21.  - Patient has received 3 units of PRBCs on 11/3  and one unit on 11/6  - Patient's anemia is  currently stable  - EGD without identifiable source of bleeding  - H&H stable    Peripheral vascular disease  On Eliquis / Plavix--- held due to bleeding from the stump which resolved and possible GI bleed but EGD did not show any source of bleeding; just friable gastric mucosa  resumed Plavix and monitoring before resuming apixaban      Social Drivers of Health with Concerns     Tobacco Use: Medium Risk (10/25/2024)    Patient History     Smoking Tobacco Use: Former     Smokeless Tobacco Use: Never     Passive Exposure: Not on file   Financial Resource Strain: Medium Risk (10/29/2024)    Overall Financial Resource Strain (CARDIA)     Difficulty of Paying Living Expenses: Somewhat hard   Physical Activity: Inactive (10/29/2024)    Exercise Vital Sign     Days of Exercise per Week: 0 days     Minutes of Exercise per Session: 0 min   Stress: Stress Concern Present (10/29/2024)    [employer] - Occupational Stress Questionnaire     Feeling of Stress : To some extent   Health Literacy: Adequate Health Literacy (10/29/2024)     Health Literacy     Frequency of need for help with medical instructions: Rarely   Recent Concern: Health Literacy - Inadequate Health Literacy (10/28/2024)     Health Literacy     Frequency of need for help with medical instructions: Sometimes       Active Hospital Problems    Diagnosis  POA    *Cellulitis of left thigh [L03.116]  Yes    Pressure injury of sacral region, unstageable [L89.150]  Yes    Morbid obesity with BMI of 50.0-59.9, adult [E66.01, Z68.43]  Not Applicable    ESRD (end stage renal disease) [N18.6]  Yes    S/P AKA (above knee amputation) bilateral [Z89.611, Z89.612]  Not Applicable    Septic shock [A41.9, R65.21]  Yes    Chronic combined systolic and diastolic congestive heart failure [I50.42]  Yes    Anemia due to chronic kidney disease, on chronic dialysis [N18.6, D63.1, Z99.2]  Not Applicable    Peripheral vascular disease [I73.9]  Yes       Resolved Hospital Problems    Diagnosis Date Resolved POA    Metabolic acidosis [E87.20] 10/29/2024 Yes       Inpatient Medications Prescribed for Management of Current Problems:     Scheduled Meds:   amoxicillin-clavulanate 500-125mg  1 tablet Oral BID    atorvastatin  80 mg Oral Daily    calcitRIOL  0.5 mcg Oral Daily    cinacalcet  30 mg Oral Every Mon, Wed, Fri    doxycycline  100 mg Oral Q12H    insulin glargine U-100  5 Units Subcutaneous QHS    pantoprazole  40 mg Oral Daily    pregabalin  50 mg Oral BID    sertraline  100 mg Oral Daily    sodium bicarbonate  650 mg Oral TID    vitamin renal formula (B-complex-vitamin c-folic acid)  1 capsule Oral Daily     Continuous Infusions:  PRN Meds:.  Current Facility-Administered Medications:     0.9%  NaCl infusion (for blood administration), , Intravenous, Q24H PRN    0.9%  NaCl infusion (for blood administration), , Intravenous, Q24H PRN    0.9% NaCl, , Intravenous, PRN    acetaminophen, 650 mg, Oral, Q4H PRN    dextrose 10%, 12.5 g, Intravenous, PRN    dextrose 10%, 25 g, Intravenous, PRN    glucagon (human recombinant), 1 mg, Intramuscular, PRN    glucose, 16 g, Oral, PRN    glucose, 24 g, Oral, PRN    heparin (porcine), 3,600 Units, Intravenous, PRN    insulin aspart U-100, 0-10 Units, Subcutaneous, QID (AC + HS) PRN    melatonin, 6 mg, Oral, Nightly PRN    naloxone, 0.02 mg, Intravenous, PRN    ondansetron, 4 mg, Intravenous, Q8H PRN    oxyCODONE, 10 mg, Oral, Q12H PRN    oxyCODONE, 5 mg, Oral, Q6H PRN    polyethylene glycol, 17 g, Oral, PRN    senna-docusate 8.6-50 mg, 1 tablet, Oral, BID PRN    sodium chloride 0.9%, 250 mL, Intravenous, PRN    sodium chloride 0.9%, 10 mL, Intravenous, Q8H PRN    VTE Risk Mitigation (From admission, onward)           Ordered     IP VTE HIGH RISK PATIENT  Once         10/28/24 2114     Place sequential compression device  Until discontinued         10/28/24 2114     Reason for No Pharmacological VTE Prophylaxis  Once         Question:  Reasons:  Answer:  Already adequately anticoagulated on oral Anticoagulants    10/28/24 2114     heparin (porcine) injection 3,600 Units  As needed (PRN)         10/28/24 2114                  I have completed this tele-visit without the assistance of a telepresenter.    The attending portion of this evaluation, treatment, and documentation was performed per Aurora Feliciano MD via Telemedicine AudioVisual using the secure [employer] software platform with 2 way audio/video. The provider was located off-site and the patient is located in the hospital. The aforementioned video software was utilized to document the relevant history and physical exam    I spent a total of 51 minutes on the day of the visit.This includes face to face time and non-face to face time preparing to see the patient (eg, review of tests), obtaining and/or reviewing separately obtained history, documenting clinical information in the electronic or other health record, independently interpreting results and communicating results to the patient/family/caregiver, or care coordinator.      Aurora Feliciano MD  Department of [facility] Medicine   [facility]

## 2024-11-11 NOTE — PROGRESS NOTES
Patient seen.  Dialysis in progress.  Labs and vitals reviewed.  /69.  Pulse 87.  Epogen with dialysis.  Discussed with dialysis nurse.

## 2024-11-11 NOTE — SUBJECTIVE & OBJECTIVE
Interval History: Virtual follow up visit for suspected Leg wound, left [S83.139Z]  Wound infection [T14.8XXA, L08.9] present on admission.   This service was provided by telemedicine.    The patient location is: K404/K404 A   Admitted 10/28/2024  9:02 PM    CC: L thigh cellulitis    The patient is able to provide adequate history. History was obtained from patient and past medical records.   No significant events overnight reported.  Patient complains of nothing new      Data  Details     [x]   Lab results reviewed 11/11/2024  BGs > goal but improved. Hyponatremia. Phos = 4.1. WBC increased slightly today. H&H stable.    []   Micro reports reviewed 11/11/2024      []   Pathology reports reviewed 11/11/2024     []   Imaging reports reviewed 11/11/2024     []   Cardiology Procedure reports reviewed 11/11/2024     []   Non-HM records/CareEverywhere notes reviewed 11/11/2024      []  Tests/studies orders placed or verified 11/11/2024       []  Independently viewed/assessed 11/11/2024     [x]  11/11/2024 Discussion of:   DC plan with CM     Review of Systems   Constitutional:  Positive for activity change. Negative for fever.   Respiratory:  Negative for shortness of breath.      Objective:     Vital Signs (Most Recent):  Temp: 98.3 °F (36.8 °C) (11/11/24 0852)  Pulse: 90 (11/11/24 1042)  Resp: 16 (11/11/24 0852)  BP: 122/78 (11/11/24 1042)  SpO2: 95 % (11/11/24 0758) Vital Signs (24h Range):  Temp:  [97.5 °F (36.4 °C)-99 °F (37.2 °C)] 98.3 °F (36.8 °C)  Pulse:  [88-97] 90  Resp:  [16-20] 16  SpO2:  [92 %-99 %] 95 %  BP: ()/(37-78) 122/78     Weight: 70.8 kg (156 lb 1.4 oz)  Body mass index is 47.63 kg/m².    Intake/Output Summary (Last 24 hours) at 11/11/2024 1114  Last data filed at 11/10/2024 1735  Gross per 24 hour   Intake 720 ml   Output 1 ml   Net 719 ml        Physical Exam  Constitutional:       General: She is awake.   Cardiovascular:      Comments: Monitor and/or Vital signs reviewed at time of  visit  Pulmonary:      Effort: Pulmonary effort is normal. No accessory muscle usage or respiratory distress.   Musculoskeletal:      Right Lower Extremity: Right leg is amputated above knee.      Left Lower Extremity: Left leg is amputated above knee.   Neurological:      Mental Status: She is alert. She is not disoriented.   Psychiatric:         Attention and Perception: Attention normal.         Behavior: Behavior is cooperative.         Significant Labs:   Recent Labs   Lab 07/08/24  0000 10/25/24  1501   HGBA1C 6.4* 5.9*     Recent Labs   Lab 11/10/24  1611 11/10/24  2043 11/11/24  0607   POCTGLUCOSE 235* 210* 153*     Recent Labs   Lab 10/29/24  1031   TSH 3.304     Recent Labs   Lab 11/07/24  1732 11/08/24  0703 11/11/24  0833   WBC 12.67 11.75 14.59*   HGB 7.7* 8.1* 7.7*   HCT 23.5* 25.2* 24.0*    397 386     Recent Labs   Lab 11/06/24  1949 11/07/24  1732   GRAN 86.1*  16.3* 78.4*  9.9*   LYMPH 4.8*  0.9* 8.2*  1.0   MONO 6.3  1.2* 9.1  1.2*   EOS 0.2 0.3     Recent Labs   Lab 11/08/24  0703 11/09/24  0417 11/11/24  0833   * 132* 131*   K 4.5 4.2 4.5   CL 95 95 92*   CO2 22* 25 25   BUN 24* 22* 51*   CREATININE 3.8* 3.0* 5.4*   * 213* 96   CALCIUM 8.5* 8.4* 8.7   ALBUMIN 1.8* 1.8* 1.8*   PHOS 4.7* 3.1 4.1     Recent Labs   Lab 06/12/24  0837 06/22/24  0921 09/30/24  0840 10/29/24  0856   PROCAL 0.93*  --   --   --    LACTATE  --   --  0.8 1.6   FERRITIN  --  757*  --   --      Results for orders placed or performed during the hospital encounter of 10/25/24   Vitamin D    Collection Time: 10/28/24  5:29 AM   Result Value Ref Range    Vit D, 25-Hydroxy 9 (L) 30 - 96 ng/mL     SARS-CoV2 (COVID-19) Qualitative PCR (no units)   Date Value   10/29/2024 Detected (A)   12/22/2023 Presumptive Negative   05/03/2021 Not Detected   04/26/2021 Not Detected   09/05/2020 Not Detected     SARS-CoV-2 RNA, Amplification, Qual (no units)   Date Value   02/05/2024 Negative   11/20/2023 Negative    08/11/2022 Negative   01/24/2022 Negative   05/17/2021 Negative   03/09/2021 Negative   03/02/2021 Negative   08/17/2020 Negative   07/17/2020 Negative     POC Rapid COVID (no units)   Date Value   03/12/2022 Negative   01/31/2022 Negative   01/17/2022 Negative   12/21/2021 Negative   03/23/2021 Negative     Results for orders placed during the hospital encounter of 10/28/24    Echo Saline Bubble? No; Ultrasound enhancing contrast? No    Interpretation Summary    Left Ventricle: The left ventricle is normal in size. Mildly increased wall thickness. Regional wall motion abnormalities present. Septal motion is abnormal is consistent with bundle branch block.    Right Ventricle: Severe right ventricular enlargement. Systolic function is severely reduced.    Left Atrium: Atrial septum is bulging to the left.    Right Atrium: Right atrium is severely dilated.    Aortic Valve: The aortic valve is a trileaflet valve. There is mild aortic valve sclerosis. There is mild aortic regurgitation with a centrally directed jet.    Mitral Valve: There is mild regurgitation with a centrally directed jet.    Tricuspid Valve: There is severe regurgitation with a centrally directed jet.    Pulmonary Artery: No pulmonary hypertension. The estimated pulmonary artery systolic pressure is 26 mmHg.    IVC/SVC: Elevated venous pressure at 15 mmHg.      Echo Saline Bubble? No; Ultrasound enhancing contrast? No    Left Ventricle: The left ventricle is normal in size. Mildly increased   wall thickness. Regional wall motion abnormalities present. Septal motion   is abnormal is consistent with bundle branch block.    Right Ventricle: Severe right ventricular enlargement. Systolic   function is severely reduced.    Left Atrium: Atrial septum is bulging to the left.    Right Atrium: Right atrium is severely dilated.    Aortic Valve: The aortic valve is a trileaflet valve. There is mild   aortic valve sclerosis. There is mild aortic regurgitation  with a   centrally directed jet.    Mitral Valve: There is mild regurgitation with a centrally directed   jet.    Tricuspid Valve: There is severe regurgitation with a centrally   directed jet.    Pulmonary Artery: No pulmonary hypertension. The estimated pulmonary   artery systolic pressure is 26 mmHg.    IVC/SVC: Elevated venous pressure at 15 mmHg.      Labs and Imaging listed above were reviewed.

## 2024-11-11 NOTE — PT/OT/SLP PROGRESS
Occupational Therapy   Treatment    Name: Suyapa Connelly  MRN: [id number]  Admitting Diagnosis:  Cellulitis of left thigh  4 Days Post-Op    Recommendations:     Discharge Recommendations: Moderate Intensity Therapy  Discharge Equipment Recommendations:  to be determined by next level of care  Barriers to discharge:  Decreased caregiver support    Assessment:     Suyapa Connelly is a 59 y.o. female with a medical diagnosis of Cellulitis of left thigh. Performance deficits affecting function are weakness, impaired endurance, impaired self care skills, impaired sensation, impaired functional mobility, gait instability, impaired balance, decreased coordination, decreased upper extremity function, decreased lower extremity function, decreased safety awareness, pain, decreased ROM, impaired fine motor, impaired skin.     Rehab Prognosis:  Fair; patient would benefit from acute skilled OT services to address these deficits and reach maximum level of function.       Plan:     Patient to be seen 3 x/week to address the above listed problems via self-care/home management, therapeutic activities, therapeutic exercises  Plan of Care Expires: 10/28/24  Plan of Care Reviewed with: patient    Subjective     Chief Complaint: significant pain  Patient/Family Comments/goals: return to PLOF  Pain/Comfort:  Pain Rating 1: 10/10  Location - Side 1: Left  Location - Orientation 1: distal  Location 1: leg  Pain Addressed 1: Reposition, Distraction, Cessation of Activity, Nurse notified  Pain Rating Post-Intervention 1: 10/10    Objective:     Communicated with: nurseAllison prior to session.  Patient found HOB elevated with bed alarm, telemetry upon OT entry to room.    General Precautions: Standard, fall    Orthopedic Precautions:N/A  Braces: N/A  Respiratory Status: Room air    Activities of Daily Living:  Grooming: supervision to wash face with washcloth; bed level  Toileting: nsg reports just cleaning patient up; Dep toileting   "    [facility] 6 Click ADL: 11    Treatment & Education:  Upon entry, patient very tearful and reporting 10/10 pain (L residual limb worse than R). Declining functional bed mobility 2/2 pain. Completed facial hygiene as above; encouraged to brush teeth, but patient replied "I'll wait until after lunch". Nsg confirms patient has already eaten lunch. Patient also encouraged to perform BUE AROM as able, and assist staff /c rolling for cleaning. Patient verbalized understanding    Patient left HOB elevated with all lines intact, call button in reach, bed alarm on, and nsg notified    GOALS:   Multidisciplinary Problems       Occupational Therapy Goals          Problem: Occupational Therapy    Goal Priority Disciplines Outcome Interventions   Occupational Therapy Goal     OT, PT/OT Progressing    Description: Goals to be met by: 12/1/2024     Patient will increase functional independence with ADLs by performing:    UE Dressing with Set-up Assistance.  LE Dressing with Minimal Assistance.  Grooming while EOB with Set-up Assistance.  Sitting at edge of bed x10 minutes with Supervision.  Rolling to Bilateral with Modified [place].   Supine to sit with Minimal Assistance.                         Time Tracking:     OT Date of Treatment: 11/11/24  OT Start Time: 1511  OT Stop Time: 1520  OT Total Time (min): 9 min    Billable Minutes:Self Care/Home Management 9          Number of MACKENZIE visits since last OT visit: 0    11/11/2024  "

## 2024-11-11 NOTE — PLAN OF CARE
Recommendation:  1. Add Geremias BID to promote wound healing.   2. Encourage intake of meals & supplements as tolerated.   3. Monitor weight/labs.   4. RD to continue to follow to monitor po intake    Goals:  Pt will meet 75% of EEN/EPN by RD follow up  Nutrition Goal Status: progressing towards goal

## 2024-11-11 NOTE — ASSESSMENT & PLAN NOTE
Patient has Combined Systolic and Diastolic heart failure that is Chronic. On presentation their CHF was well compensated.     Latest ECHO  Results for orders placed during the hospital encounter of 05/07/24    Echo    Interpretation Summary    Left Ventricle: The left ventricle is mildly dilated. Ventricular mass is normal. Normal wall thickness. Severe global hypokinesis present. There is severely reduced systolic function. Ejection fraction by visual approximation is 15%. There is diastolic dysfunction.    Right Ventricle: Severe right ventricular enlargement. Wall thickness is normal. Right ventricle wall motion has global hypokinesis. Systolic function is mildly reduced.    Left Atrium: Left atrium is severely dilated. There is an atrial septal aneurysm.    Right Atrium: Right atrium is dilated.    Aortic Valve: There is mild aortic regurgitation.    Mitral Valve: There is mild regurgitation.    Tricuspid Valve: There is annular dilation present. There is normal leaflet mobility. There is severe functional regurgitation.    Pulmonary Artery: The estimated pulmonary artery systolic pressure is at least 24 mmHg. PASP is likely under-estimated in the setting of severe tricuspid regurgitation.    IVC/SVC: Central venous pressure of at least 8 mmHg.    No vegetation seen.    Current Heart Failure Medications       - Monitor strict I&Os and daily weights.    - Place on telemetry  - Low sodium diet  - Place on fluid restriction of 1 L.   - Cardiology has not been consulted  - The patient's volume status is stable but not at their baseline as indicated by edema- UF with HD as tolerated.   - Lasix stopped 10/30 due to low BP

## 2024-11-11 NOTE — ASSESSMENT & PLAN NOTE
Anemia is likely due to acute blood loss which was from bleeding from L stump wound . Most recent hemoglobin and hematocrit are listed below.  Recent Labs     11/08/24  0703   HGB 8.1*   HCT 25.2*       - Monitor serial CBC   - Transfuse PRBC if patient becomes hemodynamically unstable, symptomatic or H/H drops below 7/21.  - Patient has received 3 units of PRBCs on 11/3  and one unit on 11/6  - Patient's anemia is currently stable  - EGD without identifiable source of bleeding  - H&H stable

## 2024-11-11 NOTE — PT/OT/SLP PROGRESS
Physical Therapy      Patient Name:  Suyapa Connelly   MRN:  [id number]    Patient not seen today secondary to Dialysis (pt to be discharged in PM). Will follow-up n/a.

## 2024-11-11 NOTE — PT/OT/SLP PROGRESS
Occupational Therapy      Patient Name:  Suyapa Connelly   MRN:  [id number]    Patient not seen today secondary to Dialysis.    11/11/2024

## 2024-11-11 NOTE — ASSESSMENT & PLAN NOTE
Body mass index is 47.63 kg/m². Morbid obesity complicates all aspects of disease management from diagnostic modalities to treatment.

## 2024-11-12 VITALS
HEART RATE: 79 BPM | TEMPERATURE: 99 F | HEIGHT: 55 IN | BODY MASS INDEX: 35.87 KG/M2 | SYSTOLIC BLOOD PRESSURE: 128 MMHG | DIASTOLIC BLOOD PRESSURE: 56 MMHG | WEIGHT: 155 LBS | RESPIRATION RATE: 20 BRPM | OXYGEN SATURATION: 96 %

## 2024-11-12 LAB
POCT GLUCOSE: 135 MG/DL (ref 70–110)
POCT GLUCOSE: 153 MG/DL (ref 70–110)
POCT GLUCOSE: 261 MG/DL (ref 70–110)

## 2024-11-12 PROCEDURE — 94761 N-INVAS EAR/PLS OXIMETRY MLT: CPT | Mod: HCNC

## 2024-11-12 PROCEDURE — 87517 HEPATITIS B DNA QUANT: CPT | Mod: HCNC | Performed by: INTERNAL MEDICINE

## 2024-11-12 PROCEDURE — 97535 SELF CARE MNGMENT TRAINING: CPT | Mod: HCNC,CO

## 2024-11-12 PROCEDURE — 97530 THERAPEUTIC ACTIVITIES: CPT | Mod: HCNC,CO

## 2024-11-12 PROCEDURE — 97110 THERAPEUTIC EXERCISES: CPT | Mod: HCNC,CO

## 2024-11-12 PROCEDURE — 25000003 PHARM REV CODE 250: Mod: HCNC | Performed by: INTERNAL MEDICINE

## 2024-11-12 PROCEDURE — 97530 THERAPEUTIC ACTIVITIES: CPT | Mod: HCNC,CQ

## 2024-11-12 PROCEDURE — 25000003 PHARM REV CODE 250: Mod: HCNC | Performed by: STUDENT IN AN ORGANIZED HEALTH CARE EDUCATION/TRAINING PROGRAM

## 2024-11-12 PROCEDURE — 36415 COLL VENOUS BLD VENIPUNCTURE: CPT | Mod: HCNC | Performed by: INTERNAL MEDICINE

## 2024-11-12 RX ORDER — ISOSORBIDE DINITRATE 10 MG/1
10 TABLET ORAL 2 TIMES DAILY
Status: DISCONTINUED | OUTPATIENT
Start: 2024-11-12 | End: 2024-11-12 | Stop reason: HOSPADM

## 2024-11-12 RX ORDER — HYDRALAZINE HYDROCHLORIDE 10 MG/1
10 TABLET, FILM COATED ORAL EVERY 12 HOURS
Status: ON HOLD
Start: 2024-11-12 | End: 2025-11-12

## 2024-11-12 RX ORDER — HYDRALAZINE HYDROCHLORIDE 10 MG/1
10 TABLET, FILM COATED ORAL EVERY 12 HOURS
Status: DISCONTINUED | OUTPATIENT
Start: 2024-11-12 | End: 2024-11-12 | Stop reason: HOSPADM

## 2024-11-12 RX ORDER — OXYCODONE AND ACETAMINOPHEN 5; 325 MG/1; MG/1
1 TABLET ORAL EVERY 8 HOURS PRN
Qty: 10 TABLET | Refills: 0 | Status: ON HOLD | OUTPATIENT
Start: 2024-11-12

## 2024-11-12 RX ORDER — ISOSORBIDE DINITRATE 10 MG/1
10 TABLET ORAL 2 TIMES DAILY
Status: ON HOLD
Start: 2024-11-12 | End: 2025-11-12

## 2024-11-12 RX ORDER — PREGABALIN 75 MG/1
75 CAPSULE ORAL DAILY
Qty: 7 CAPSULE | Refills: 0 | Status: ON HOLD | OUTPATIENT
Start: 2024-11-12

## 2024-11-12 RX ORDER — CARVEDILOL 3.12 MG/1
3.12 TABLET ORAL 2 TIMES DAILY
Status: DISCONTINUED | OUTPATIENT
Start: 2024-11-12 | End: 2024-11-12 | Stop reason: HOSPADM

## 2024-11-12 RX ADMIN — LACTOBACILLUS TAB 4 TABLET: TAB at 11:11

## 2024-11-12 RX ADMIN — PANTOPRAZOLE SODIUM 40 MG: 40 TABLET, DELAYED RELEASE ORAL at 08:11

## 2024-11-12 RX ADMIN — INSULIN ASPART 6 UNITS: 100 INJECTION, SOLUTION INTRAVENOUS; SUBCUTANEOUS at 05:11

## 2024-11-12 RX ADMIN — CALCITRIOL CAPSULES 0.25 MCG 0.5 MCG: 0.25 CAPSULE ORAL at 08:11

## 2024-11-12 RX ADMIN — SERTRALINE HYDROCHLORIDE 100 MG: 100 TABLET ORAL at 08:11

## 2024-11-12 RX ADMIN — LACTOBACILLUS TAB 4 TABLET: TAB at 05:11

## 2024-11-12 RX ADMIN — ISOSORBIDE DINITRATE 10 MG: 10 TABLET ORAL at 08:11

## 2024-11-12 RX ADMIN — HYDRALAZINE HYDROCHLORIDE 10 MG: 10 TABLET ORAL at 08:11

## 2024-11-12 RX ADMIN — DOXYCYCLINE HYCLATE 100 MG: 100 TABLET, COATED ORAL at 08:11

## 2024-11-12 RX ADMIN — SODIUM BICARBONATE 650 MG TABLET 650 MG: at 08:11

## 2024-11-12 RX ADMIN — PREGABALIN 50 MG: 50 CAPSULE ORAL at 08:11

## 2024-11-12 RX ADMIN — AMOXICILLIN AND CLAVULANATE POTASSIUM 500 MG: 500; 125 TABLET, FILM COATED ORAL at 08:11

## 2024-11-12 RX ADMIN — OXYCODONE 5 MG: 5 TABLET ORAL at 05:11

## 2024-11-12 RX ADMIN — INSULIN ASPART 2 UNITS: 100 INJECTION, SOLUTION INTRAVENOUS; SUBCUTANEOUS at 12:11

## 2024-11-12 RX ADMIN — ATORVASTATIN CALCIUM 80 MG: 40 TABLET, FILM COATED ORAL at 08:11

## 2024-11-12 RX ADMIN — CARVEDILOL 3.12 MG: 3.12 TABLET, FILM COATED ORAL at 08:11

## 2024-11-12 RX ADMIN — SODIUM BICARBONATE 650 MG TABLET 650 MG: at 02:11

## 2024-11-12 RX ADMIN — Medication 1 CAPSULE: at 08:11

## 2024-11-12 NOTE — PLAN OF CARE
NURSING HOME ORDERS    11/12/2024  [facility] - TELEMETRY  [address] [facility] [place] [address]  Dept: [phone]  Loc: [phone]     Admit to Nursing Home:  Skilled Nursing Facility    Diagnoses:  Active Hospital Problems    Diagnosis  POA    *Cellulitis of left thigh [L03.116]  Yes    Pressure injury of sacral region, unstageable [L89.150]  Yes    Morbid obesity with BMI of 50.0-59.9, adult [E66.01, Z68.43]  Not Applicable    ESRD (end stage renal disease) [N18.6]  Yes    S/P AKA (above knee amputation) bilateral [Z89.611, Z89.612]  Not Applicable    Septic shock [A41.9, R65.21]  Yes    Chronic combined systolic and diastolic congestive heart failure [I50.42]  Yes    Anemia due to chronic kidney disease, on chronic dialysis [N18.6, D63.1, Z99.2]  Not Applicable    Peripheral vascular disease [I73.9]  Yes      Resolved Hospital Problems    Diagnosis Date Resolved POA    Metabolic acidosis [E87.20] 10/29/2024 Yes       Patient is homebound due to:  Cellulitis of left thigh    Allergies:  Review of patient's allergies indicates:   Allergen Reactions    Ciprofloxacin Itching    Iodine      Kidney injury    Pcn [penicillins]      Rash; tolerated ceftriaxone on 1/13/20       Vitals:  Every shift    Diet: Diabetic Renal; 2000 Calories (up to 75 gm per meal); Fluid restriction - 1000 mL    Activities:   Up in a chair each morning as tolerated and Activity as tolerated    Goals of Care Treatment Preferences:  Code Status: Full Code      Labs:  per facility protocol for HD patients    Nursing Precautions:  Aspiration , Fall, and Pressure ulcer prevention    Consults:   PT to evaluate and treat- 5 times a week, OT to evaluate and treat- 5 times a week, Wound Care, and Nutrition to evaluate and recommend diet     Wound Care:   Dressing change: Nursing to cleanse L AKA wound with Vashe wound cleanser and apply Aquacel AG to wound. Cover with abd pad and secure with medipore tape. Change 3x/week.  M,W,F.  Nursing to cleanse L AKA wound with Vashe wound cleanser and apply Aquacel AG to wound. Cover with abd pad and secure with medipore tape. Change 3x/week. M,W,F.                   Diabetes Care:  SN to perform and educate Diabetic management with blood glucose monitoring:   Fingerstick blood sugar AC and HS, and Report CBG < 70 or > 280 to physician.      Medications: Discontinue all previous medication orders, if any. See new list below.  HOLD isosorbide and hydralazine on hemodialysis days and for MAP less than 70       Medication List        START taking these medications      amoxicillin-clavulanate 500-125mg 500-125 mg Tab  Commonly known as: AUGMENTIN  Take 1 tablet (500 mg total) by mouth 2 (two) times daily. for 5 days     cinacalcet 30 MG Tab  Commonly known as: SENSIPAR  Take 1 tablet (30 mg total) by mouth every Mon, Wed, Fri.  Start taking on: November 13, 2024     doxycycline 100 MG tablet  Commonly known as: VIBRA-TABS  Take 1 tablet (100 mg total) by mouth every 12 (twelve) hours. for 5 days     insulin glargine U-100 (Lantus) 100 unit/mL (3 mL) Inpn pen  Inject 5 Units into the skin every evening.  Replaces: LANTUS U-100 INSULIN 100 unit/mL injection     isosorbide dinitrate 10 MG tablet  Commonly known as: ISORDIL  Take 1 tablet (10 mg total) by mouth 2 (two) times daily.     Lactobacillus rhamnosus GG 10 billion cell capsule  Commonly known as: CULTURELLE  Take 1 capsule by mouth once daily.     oxyCODONE-acetaminophen 5-325 mg per tablet  Commonly known as: PERCOCET  Take 1 tablet by mouth every 8 (eight) hours as needed for Pain.     vitamin renal formula (B-complex-vitamin c-folic acid) 1 mg Cap  Commonly known as: NEPHROCAP  Take 1 capsule by mouth once daily.            CHANGE how you take these medications      acetaminophen 325 MG tablet  Commonly known as: TYLENOL  Take 2 tablets (650 mg total) by mouth every 8 (eight) hours as needed for Pain.  What changed:   medication  strength  how much to take     carvediloL 6.25 MG tablet  Commonly known as: COREG  Take 0.5 tablets (3.125 mg total) by mouth 2 (two) times daily.  What changed: how much to take     hydrALAZINE 10 MG tablet  Commonly known as: APRESOLINE  Take 1 tablet (10 mg total) by mouth every 12 (twelve) hours.  What changed:   medication strength  how much to take  when to take this     insulin aspart U-100 100 unit/mL (3 mL) Inpn pen  Commonly known as: NovoLOG  Inject 0-10 Units into the skin before meals and at bedtime as needed (Hyperglycemia). **MODERATE CORRECTION DOSE**  Blood Glucose  mg/dL                  Pre-meal                2200  151-200                2 units                    1 unit  201-250                4 units                    2 units  251-300                6 units                    3 units  301-350                8 units                    4 units  >350                     10 units                  5 units  Administer subcutaneously if needed at times designated by monitoring  schedule.  What changed:   how much to take  when to take this  reasons to take this  additional instructions     pregabalin 75 MG capsule  Commonly known as: LYRICA  Take 1 capsule (75 mg total) by mouth Daily.  What changed:   medication strength  how much to take  when to take this            CONTINUE taking these medications      allopurinoL 100 MG tablet  Commonly known as: ZYLOPRIM  Take 0.5 tablets (50 mg total) by mouth every other day.     apixaban 5 mg Tab  Commonly known as: ELIQUIS  Take 1 tablet (5 mg total) by mouth 2 (two) times daily.     atorvastatin 80 MG tablet  Commonly known as: LIPITOR  Take 1 tablet (80 mg total) by mouth once daily.     * blood sugar diagnostic Strp  1 each by Misc.(Non-Drug; Combo Route) route 3 (three) times daily.     * blood sugar diagnostic Strp  Use to check blood glucose 2 (two) times a day.     calcitRIOL 0.5 MCG Cap  Commonly known as: ROCALTROL  Take 1 capsule (0.5 mcg total)  "by mouth once daily.     clopidogreL 75 mg tablet  Commonly known as: PLAVIX  Take 1 tablet (75 mg total) by mouth once daily.     DEXCOM G7 SENSOR Radha  Generic drug: blood-glucose sensor  1 each by Misc.(Non-Drug; Combo Route) route once daily.     epoetin nina 4,000 unit/mL injection  Commonly known as: PROCRIT  Inject 1 mL (4,000 Units total) into the skin every Mon, Wed, Fri.     famotidine 20 MG tablet  Commonly known as: PEPCID  Take 1 tablet (20 mg total) by mouth once daily.     lancets 33 gauge Misc  TEST 3 TIMES DAILY     * pen needle, diabetic 33 gauge x 5/32" Ndle  1 each by Misc.(Non-Drug; Combo Route) route 3 (three) times daily.     * pen needle, diabetic 32 gauge x 5/32" Ndle  1 Units by Misc.(Non-Drug; Combo Route) route before meals as needed (SSI).     sertraline 100 MG tablet  Commonly known as: ZOLOFT  Take 1 tablet (100 mg total) by mouth once daily.     sodium bicarbonate 650 MG tablet  Take 1 tablet (650 mg total) by mouth 3 (three) times daily.     TRUE METRIX GLUCOSE METER Misc  Generic drug: blood-glucose meter  TEST TWICE DAILY           * This list has 4 medication(s) that are the same as other medications prescribed for you. Read the directions carefully, and ask your doctor or other care provider to review them with you.                STOP taking these medications      calcium acetate(phosphat bind) 667 mg capsule  Commonly known as: PHOSLO     dextrose 40 % gel  Commonly known as: GLUTOSE     furosemide 40 MG tablet  Commonly known as: LASIX     LANTUS U-100 INSULIN 100 unit/mL injection  Generic drug: insulin glargine U-100 (Lantus)  Replaced by: insulin glargine U-100 (Lantus) 100 unit/mL (3 mL) Inpn pen     ONE DAILY MULTIVITAMIN per tablet  Generic drug: multivitamin     traZODone 50 MG tablet  Commonly known as: DESYREL     TRIAD WOUND DRESSING Pste  Generic drug: wound dressings                Immunizations Administered as of 11/12/2024       Name Date Dose VIS Date Route Exp " Date    COVID-19, MRNA, LN-S, PF (Pfizer) (Purple Cap) 3/30/2021 11:24 AM 0.3 mL 12/12/2020 Intramuscular 7/31/2021    Site: Right deltoid     Given By: Margoth Alfredo     : [employer]     Lot: [id number]     COVID-19, MRNA, LN-S, PF (Pfizer) (Purple Cap) 3/9/2021  2:50 PM 0.3 mL 12/12/2020 Intramuscular 6/30/2021    Site: Right deltoid     Given By: Amira Berrios, RN     : [employer]     Lot: [id number]             Aurora Feliciano MD  11/12/2024

## 2024-11-12 NOTE — ASSESSMENT & PLAN NOTE
Body mass index is 47.29 kg/m². Morbid obesity complicates all aspects of disease management from diagnostic modalities to treatment.

## 2024-11-12 NOTE — PT/OT/SLP PROGRESS
"Occupational Therapy   Treatment    Name: Suyapa Connelly  MRN: [id number]  Admitting Diagnosis:  Cellulitis of left thigh  5 Days Post-Op    Recommendations:     Discharge Recommendations: Moderate Intensity Therapy  Discharge Equipment Recommendations:  to be determined by next level of care  Barriers to discharge:  Decreased caregiver support, Other (Comment) (Increased level of assistance)    Assessment:     Suyapa Connelly is a 59 y.o. female with a medical diagnosis of Cellulitis of left thigh.  Performance deficits affecting function are weakness, impaired endurance, impaired self care skills, impaired functional mobility, impaired balance, decreased upper extremity function, decreased lower extremity function, decreased safety awareness, pain, decreased ROM, impaired coordination, impaired fine motor, impaired skin, edema.    Pt found in bed, agreeable to therapy.  Pt alert and O x 3 however confused.  Pt with improved performance, cooperation and tolerance of therapy.  She continues with significant pain in L upper leg.  Pt is progressing towards goals. Continue OT services to address functional goals, progressing as able.      Rehab Prognosis:  Good; patient would benefit from acute skilled OT services to address these deficits and reach maximum level of function.       Plan:     Patient to be seen 3 x/week to address the above listed problems via self-care/home management, therapeutic activities, therapeutic exercises  Plan of Care Expires: 10/28/24  Plan of Care Reviewed with: patient    Subjective     Chief Complaint: pain   Patient/Family Comments/goals: "I don't know where my  is?"  Pain/Comfort:  Pain Rating 1: 8/10  Location - Side 1: Left  Location - Orientation 1: upper  Location 1: leg  Pain Addressed 1: Cessation of Activity, Nurse notified, Distraction, Reposition  Pain Rating Post-Intervention 1: 8/10    Objective:     Communicated with: nurse prior to session.  Patient found HOB " elevated with bed alarm, peripheral IV, telemetry upon OT entry to room.    General Precautions: Standard, fall    Orthopedic Precautions:N/A  Braces: N/A  Respiratory Status: Room air     Occupational Performance:     Bed Mobility:    Patient completed Scooting/Bridging with dependent and 2 persons, supine to HOB.   Patient completed Supine to Sit with stand by assistance, HOB fully elevated, use of bed rail, increased time and effort, vc's for effective technique  Patient completed Sit to Supine with minimum assistance for BLE assist 2/2 pain.     Activities of Daily Living:  Grooming: stand by assistance seated EOB with SBA for sitting balance.   Toileting: dependence pt noted to be soiled when returning to supine. Pt unaware that she had BM-PCT notified.        [facility] 6 Click ADL: 13    Treatment & Education:  Pt scoots seated to EOB with SBA.   Pt sat EOB ~20 min with SBA while working on self care, strengthening, and sitting balance.   Pt performed BUE AAROM for shld planes x 10 reps.  Pt performed pushups from elbow to midline x 3 reps each direction.   Pt performed trunk rotations/stretch L<>R and lateral flexion w/ overhead reach with Min A x 3 reps each direction.   Pt performed cervical AROM all planes x 5 reps each.   Pt emotional/tearful during session 2/2 not knowing where her  is and pain.  Emotional support, therapeutic listening and words of encouragement provided.  Pt thankful at end of session.            Patient left HOB elevated with all lines intact, call button in reach, bed alarm on, and PCT notified    GOALS:   Multidisciplinary Problems       Occupational Therapy Goals          Problem: Occupational Therapy    Goal Priority Disciplines Outcome Interventions   Occupational Therapy Goal     OT, PT/OT Progressing    Description: Goals to be met by: 12/1/2024     Patient will increase functional independence with ADLs by performing:    UE Dressing with Set-up Assistance.  LE Dressing with  Minimal Assistance.  Grooming while EOB with Set-up Assistance.  Sitting at edge of bed x10 minutes with Supervision.  Rolling to Bilateral with Modified [place].   Supine to sit with Minimal Assistance.                         Time Tracking:     OT Date of Treatment: 11/12/24  OT Start Time: 0954  OT Stop Time: 1024  OT Total Time (min): 30 min    Billable Minutes:Self Care/Home Management 8  Therapeutic Activity 8  Therapeutic Exercise 14            11/12/2024

## 2024-11-12 NOTE — ASSESSMENT & PLAN NOTE
Patient has Combined Systolic and Diastolic heart failure that is Chronic. On presentation their CHF was well compensated.     Latest ECHO  Results for orders placed during the hospital encounter of 05/07/24    Echo    Interpretation Summary    Left Ventricle: The left ventricle is mildly dilated. Ventricular mass is normal. Normal wall thickness. Severe global hypokinesis present. There is severely reduced systolic function. Ejection fraction by visual approximation is 15%. There is diastolic dysfunction.    Right Ventricle: Severe right ventricular enlargement. Wall thickness is normal. Right ventricle wall motion has global hypokinesis. Systolic function is mildly reduced.    Left Atrium: Left atrium is severely dilated. There is an atrial septal aneurysm.    Right Atrium: Right atrium is dilated.    Aortic Valve: There is mild aortic regurgitation.    Mitral Valve: There is mild regurgitation.    Tricuspid Valve: There is annular dilation present. There is normal leaflet mobility. There is severe functional regurgitation.    Pulmonary Artery: The estimated pulmonary artery systolic pressure is at least 24 mmHg. PASP is likely under-estimated in the setting of severe tricuspid regurgitation.    IVC/SVC: Central venous pressure of at least 8 mmHg.    No vegetation seen.    Current Heart Failure Medications  carvedilol (COREG) tablet, 2 times daily, Oral    - Monitor strict I&Os and daily weights.    - Place on telemetry  - Low sodium diet  - Place on fluid restriction of 1 L.   - Cardiology has not been consulted  - The patient's volume status is stable but not at their baseline as indicated by edema- UF with HD as tolerated.   - Lasix stopped 10/30 due to low BP

## 2024-11-12 NOTE — PLAN OF CARE
Problem: Adult Inpatient Plan of Care  Goal: Plan of Care Review  Outcome: Progressing     Problem: Diabetes Comorbidity  Goal: Blood Glucose Level Within Targeted Range  Outcome: Progressing     Problem: Wound  Goal: Optimal Wound Healing  Outcome: Progressing      Argentina Portillo  [facility] concerns of  Chief Complaint   Patient presents with    T-5000 FALL    Laceration       Patient awake, alert, pink, and interactive with staff.  Pt fell and hit forehead on coffee table at approx 1530. Pt sustained laceration between the eyebrows. Taken to urgent care and steri-strips placed. Pt pulled most of steri-strips off at bedtime. One still in place.  -LOC, -n/v.   Pt playful in lobby but cries in triage room.     Patient medicated at home with Motrin at 2100.      Patient to lobby with parent in no apparent distress. Parent verbalizes understanding that patient is NPO until seen and cleared by ERP. Education provided about triage process; regarding acuities and possible wait time. Parent verbalizes understanding to inform staff of any new concerns or change in status.      BP (!) 101/61   Pulse (!) 141   Temp 36.3 °C (97.3 °F) (Temporal)   Wt 13.9 kg (30 lb 10.3 oz)   SpO2 96%   BMI 2.98 kg/m²     Appropriate PPE was worn during triage.

## 2024-11-12 NOTE — ASSESSMENT & PLAN NOTE
Anemia is likely due to acute blood loss which was from bleeding from L stump wound . Most recent hemoglobin and hematocrit are listed below.  Recent Labs     11/11/24  0833   HGB 7.7*   HCT 24.0*       - Monitor serial CBC   - Transfuse PRBC if patient becomes hemodynamically unstable, symptomatic or H/H drops below 7/21.  - Patient has received 3 units of PRBCs on 11/3  and one unit on 11/6  - Patient's anemia is currently stable  - EGD without identifiable source of bleeding  - H&H stable

## 2024-11-12 NOTE — PLAN OF CARE

## 2024-11-12 NOTE — ASSESSMENT & PLAN NOTE
L AKA- necrotic amputation surgical site- developing eschar 9x15x0.2cm and blisters to periwound  Continue IV antibiotics   General surgery consulted for wound debridement. Recs: continue recommendations per Wound Care   - Aquacel AG daily to L AKA    ID recs:   Continued vancomycin/ piperacillin tazobactam, can be switched to Augmentin and doxy on discharge for total of 14 days  Given one dose of metronidazole.  Difficult to determine length of therapy at this time but related to treatment response. Unfortunately, the aspect of the ischemic lesions complicate assessment of recovery.  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  Patient is status post CABG as well as stents that have clotted in her lower extremities    Pain control-- continue current regimen as BP allows  Cautious with opioids secondary to bradypnea / decreased respiratory rate

## 2024-11-12 NOTE — ASSESSMENT & PLAN NOTE
On Eliquis / Plavix--- held due to bleeding from the stump which resolved and possible GI bleed but EGD did not show any source of bleeding; just friable gastric mucosa  resumed Plavix and monitoring before resuming apixaban  Resume apixaban at discharge

## 2024-11-12 NOTE — PLAN OF CARE
Problem: Occupational Therapy  Goal: Occupational Therapy Goal  Description: Goals to be met by: 12/1/2024     Patient will increase functional independence with ADLs by performing:    UE Dressing with Set-up Assistance.  LE Dressing with Minimal Assistance.  Grooming while EOB with Set-up Assistance.  Sitting at edge of bed x10 minutes with Supervision.  Rolling to Bilateral with Modified [place].   Supine to sit with Minimal Assistance.    Outcome: Progressing   Suyapa Connelly is a 59 y.o. female with a medical diagnosis of Cellulitis of left thigh.  Performance deficits affecting function are weakness, impaired endurance, impaired self care skills, impaired functional mobility, impaired balance, decreased upper extremity function, decreased lower extremity function, decreased safety awareness, pain, decreased ROM, impaired coordination, impaired fine motor, impaired skin, edema.    Pt found in bed, agreeable to therapy.  Pt alert and O x 3 however confused.  Pt with improved performance, cooperation and tolerance of therapy.  She continues with significant pain in L upper leg.  Pt is progressing towards goals. Continue OT services to address functional goals, progressing as able.

## 2024-11-12 NOTE — DISCHARGE SUMMARY
[facility] Medicine  Telemedicine Discharge Summary      Patient Name: Suyapa Connelly  MRN: [id number]  Patient Class: IP- Inpatient  Admission Date: 10/28/2024  Hospital Length of Stay: 15 days  Discharge Date and Time:  11/12/2024 4:28 PM  Attending Physician: Aurora Feliciano MD   Discharging Provider: Aurora Feliciano MD  Primary Care Provider: Vanessa Noel MD      HPI:   Patient is a 59-year-old female with a known past medical history of hypertension, hyperlipidemia, diabetes mellitus type 2, ESRD on HD, and deconditioning that presents the ER after being on floor throughout the evening.     Patient presents after being found on the floor by a neighbor today.  Patient reports that she got out of bed to change her pad and diaper but was unable to get back into bed, staying on the floor throughout the evening.  Patient's  who normally cares for her, was arrested yesterday and is in [facility] and patient was unable to care for herself.  When patient was found by neighbor on floor, EMS was called and patient was brought to the ER for further evaluation.  On arrival to ER, patient in no acute distress.  Labs essentially within normal limits.  Patient last dialyzed on Thursday and no signs of fluid overload or electrolyte abnormalities.  Patient has no family NS able to assist her at home other than her  who is currently incarcerated.  Patient admitted for social problems and hemodialysis.    Patient due to her open wounds and CT finding of her left thigh - emphysema can not rule out infection -     Procedure(s) (LRB):  EGD (ESOPHAGOGASTRODUODENOSCOPY) (N/A)      Hospital Course:   Patient is known to Surgery service. She is a 58yo F with pmhx of hypertension, hyperlipidemia, diabetes mellitus type 2, ESRD on HD, and PAD (s/p B/l AKA) who presented to the ED after being found on the floor by her neighbor. Patient reports that she got out of bed to change her pad and diaper but  was unable to get back into bed and stayed on the floor throughout the evening. Per nursing, patient was altered and minimally responsive on admission to ICU but has become more alert since starting HD/ being put on bipap. Patient was noted to have some wound breakdown at the medial aspect of her L AKA incision and CT was obtained that showed subcutaneous fatty stranding of the left thigh and distal medial thigh subcutaneous emphysema. WBC and lactate wnl. General Surgery was consulted for further evaluation d/t concerns for nec fasc. On exam patient is more alert and answering questions appropriately. Wound breakdown at medial aspect of L AKA incision with some surrounding induration. No significant erythema, fluctuance or drainage noted. Patient does have pain on palpation of that area. Patient was determined not to have nec fasc based on exam and labs. No indications for wound debridement and recommended continuing with current wound care regimen. She had been progressing well and was stepped down to the floor. Overnight on 11/3/24 she was noted to have significant bleeding from L stump with saturation of bedding. Surgicel was applied by primary team and CBC drawn that showed a hgb of 6.6 from 9.4. Blood products were infused and she was stepped up to the ICU for further monitoring. General Surgery was consulted for evaluation. Hgb came up to 8.2 with transfusion of 1u pRBC. This am she has reportedly continued to saturate her dressing even with pressure applied and had to be started on levo to maintain her pressures.     See Problems listed below for additional details of this hospital stay.       Goals of Care Treatment Preferences:  Code Status: Full Code      Consults:   Consults (From admission, onward)          Status Ordering Provider     Inpatient consult to Registered Dietitian/Nutritionist  Once        Provider:  (Not yet assigned)    Completed KANA GONZALEZ     Inpatient virtual consult to [facility]  Medicine  Once        Provider:  (Not yet assigned)    Completed AL-ABDVIKYM, AYA     Inpatient consult to [facility]  Once        Provider:  (Not yet assigned)    Completed AL-ABDIVANEEM, AYA     Inpatient consult to General Surgery  Once        Provider:  Itzel Kuo MD    Completed ANN DAILEY     Inpatient consult to General Surgery  Once        Provider:  Godwin Ramires MD    Completed ALIS CARRION     Inpatient consult to [facility]  Once        Provider:  Heide Back MD    Completed KIARA, AHMED     Inpatient consult to Infectious Diseases  Once        Provider:  Juarez Gamboa MD    Completed MARYANNIM, AHMED     Inpatient consult to Social Work  Once        Provider:  (Not yet assigned)    Completed JESSICA SHOEMAKER            Cardiac/Vascular  Chronic combined systolic and diastolic congestive heart failure  Patient has Combined Systolic and Diastolic heart failure that is Chronic. On presentation their CHF was well compensated.     Latest ECHO  Results for orders placed during the hospital encounter of 05/07/24    Echo    Interpretation Summary    Left Ventricle: The left ventricle is mildly dilated. Ventricular mass is normal. Normal wall thickness. Severe global hypokinesis present. There is severely reduced systolic function. Ejection fraction by visual approximation is 15%. There is diastolic dysfunction.    Right Ventricle: Severe right ventricular enlargement. Wall thickness is normal. Right ventricle wall motion has global hypokinesis. Systolic function is mildly reduced.    Left Atrium: Left atrium is severely dilated. There is an atrial septal aneurysm.    Right Atrium: Right atrium is dilated.    Aortic Valve: There is mild aortic regurgitation.    Mitral Valve: There is mild regurgitation.    Tricuspid Valve: There is annular dilation present. There is normal leaflet mobility. There is severe  functional regurgitation.    Pulmonary Artery: The estimated pulmonary artery systolic pressure is at least 24 mmHg. PASP is likely under-estimated in the setting of severe tricuspid regurgitation.    IVC/SVC: Central venous pressure of at least 8 mmHg.    No vegetation seen.    Current Heart Failure Medications  hydrALAZINE tablet 10 mg, Every 12 hours, Oral  isosorbide dinitrate tablet 10 mg, 2 times daily, Oral  carvediloL tablet 3.125 mg, 2 times daily, Oral    - Monitor strict I&Os and daily weights.    - Place on telemetry  - Low sodium diet  - Place on fluid restriction of 1 L.   - Cardiology has not been consulted  - The patient's volume status is stable but not at their baseline as indicated by edema- UF with HD as tolerated.   - Lasix stopped 10/30 due to low BP    Peripheral vascular disease  On Eliquis / Plavix--- held due to bleeding from the stump which resolved and possible GI bleed but EGD did not show any source of bleeding; just friable gastric mucosa  resumed Plavix and monitoring before resuming apixaban  Resume apixaban at discharge    Renal/  ESRD (end stage renal disease)  Nephrology consulted for HD.    ID  * Cellulitis of left thigh  L AKA- necrotic amputation surgical site- developing eschar 9x15x0.2cm and blisters to periwound  Continue IV antibiotics   General surgery consulted for wound debridement. Recs: continue recommendations per Wound Care   - Aquacel AG daily to L AKA    ID recs:   Continued vancomycin/ piperacillin tazobactam, can be switched to Augmentin and doxy on discharge for total of 14 days  Given one dose of metronidazole.  Difficult to determine length of therapy at this time but related to treatment response. Unfortunately, the aspect of the ischemic lesions complicate assessment of recovery.  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  Patient is status post CABG as well as stents that have clotted in her lower extremities    Pain control-- continue  current regimen as BP allows  Cautious with opioids secondary to bradypnea / decreased respiratory rate    Septic shock  This patient had shock. The type of shock is distributive due to sepsis. She was admitted to the intensive care unit 10/29.   Patient did not require IVF resuscitation or pressors. BP recovered.  Stepped down 10/31.   Continue IV abx. For 14 days total  Changed to po Augmentin and doxycyline to complete course    Oncology  Anemia due to chronic kidney disease, on chronic dialysis  Anemia is likely due to acute blood loss which was from bleeding from L stump wound . Most recent hemoglobin and hematocrit are listed below.  Recent Labs     11/11/24  0833   HGB 7.7*   HCT 24.0*       - Monitor serial CBC   - Transfuse PRBC if patient becomes hemodynamically unstable, symptomatic or H/H drops below 7/21.  - Patient has received 3 units of PRBCs on 11/3  and one unit on 11/6  - Patient's anemia is currently stable  - EGD without identifiable source of bleeding  - H&H stable    Endocrine  Morbid obesity with BMI of 50.0-59.9, adult  Body mass index is 47.29 kg/m². Morbid obesity complicates all aspects of disease management from diagnostic modalities to treatment.     Orthopedic  Pressure injury of sacral region, unstageable  Wound care    - Pressure injury prevention interventions   - WCON consulted    S/P AKA (above knee amputation) bilateral  Fall precautions   Having phantom limb pain; increased Lyrica 75 mg daily      Social Drivers of Health     Tobacco Use: Medium Risk (11/13/2024)    Patient History     Smoking Tobacco Use: Former     Smokeless Tobacco Use: Never     Passive Exposure: Not on file   Alcohol Use: Not At Risk (10/29/2024)    AUDIT-C     Frequency of Alcohol Consumption: Never     Average Number of Drinks: Patient does not drink     Frequency of Binge Drinking: Never   Financial Resource Strain: Medium Risk (10/29/2024)    Overall Financial Resource Strain (CARDIA)     Difficulty of  Paying Living Expenses: Somewhat hard   Food Insecurity: No Food Insecurity (10/29/2024)    Hunger Vital Sign     Worried About Running Out of Food in the Last Year: Never true     Ran Out of Food in the Last Year: Never true   Transportation Needs: No Transportation Needs (10/29/2024)    TRANSPORTATION NEEDS     Transportation : No   Physical Activity: Inactive (10/29/2024)    Exercise Vital Sign     Days of Exercise per Week: 0 days     Minutes of Exercise per Session: 0 min   Stress: Stress Concern Present (10/29/2024)    [employer] - Occupational Stress Questionnaire     Feeling of Stress : To some extent   Housing Stability: Low Risk  (10/29/2024)    Housing Stability Vital Sign     Unable to Pay for Housing in the Last Year: No     Homeless in the Last Year: No   Depression: Low Risk  (10/15/2024)    Depression     Last PHQ-4: Flowsheet Data: 2   Utilities: Not At Risk (10/29/2024)    [facility] Utilities     Threatened with loss of utilities: No   Health Literacy: Adequate Health Literacy (10/29/2024)     Health Literacy     Frequency of need for help with medical instructions: Rarely   Recent Concern: Health Literacy - Inadequate Health Literacy (10/28/2024)     Health Literacy     Frequency of need for help with medical instructions: Sometimes   Social Isolation: Moderately Integrated (10/29/2024)    Social Isolation     Social Isolation: 2       Final Active Diagnoses:    Diagnosis Date Noted POA    PRINCIPAL PROBLEM:  Cellulitis of left thigh [L03.116] 10/28/2024 Yes    Pressure injury of sacral region, unstageable [L89.150] 05/14/2024 Yes    Morbid obesity with BMI of 50.0-59.9, adult [E66.01, Z68.43] 05/30/2023 Not Applicable    ESRD (end stage renal disease) [N18.6] 04/21/2023 Yes    S/P AKA (above knee amputation) bilateral [Z89.611, Z89.612] 03/26/2022 Not Applicable    Septic shock [A41.9, R65.21] 01/17/2022 Yes    Chronic combined systolic and diastolic congestive  heart failure [I50.42] 06/21/2021 Yes    Anemia due to chronic kidney disease, on chronic dialysis [N18.6, D63.1, Z99.2] 01/27/2020 Not Applicable    Peripheral vascular disease [I73.9] 10/06/2015 Yes      Problems Resolved During this Admission:    Diagnosis Date Noted Date Resolved POA    Metabolic acidosis [E87.20] 08/16/2022 10/29/2024 Yes       Discharged Condition: stable    Disposition: Skilled Nursing Facility    Follow Up:   Follow-up Information       Vanessa Noel MD Follow up in 2 week(s).    Specialty: Internal Medicine  Why: For discharge from hospital follow up  Contact information:  [address] [facility] [address]  [phone]               Juarez Gamboa MD. Schedule an appointment as soon as possible for a visit.    Specialty: Infectious Diseases  Why: For discharge from hospital follow up  Contact information:  [address]  [facility] [address]  [phone]                           Future Appointments   Date Time Provider Department Center   11/15/2024  1:00 PM Vanessa Noel MD [facility]   11/22/2024  2:30 PM Georgina Iyer PA-C [facility] ENDOCR [facility]     Patient Instructions:      Diet diabetic     Diet renal     Change dressing (specify)   Order Comments: Dressing change: Nursing to cleanse L AKA wound with Vashe wound cleanser and apply Aquacel AG to wound. Cover with abd pad and secure with medipore tape. Change 3x/week. M,W,F.  Nursing to cleanse L AKA wound with Vashe wound cleanser and apply Aquacel AG to wound. Cover with abd pad and secure with medipore tape. Change 3x/week. M,W,F.     Activity as tolerated       Significant Diagnostic Studies:   Recent Labs   Lab 07/08/24  0000 10/25/24  1501   HGBA1C 6.4* 5.9*     Recent Labs   Lab 11/07/24  1732 11/08/24  0703 11/11/24  0833   WBC 12.67 11.75 14.59*   HGB 7.7* 8.1* 7.7*   HCT 23.5* 25.2* 24.0*    397 386     Recent Labs   Lab 11/06/24  1949 11/07/24  1732   GRAN 86.1*  16.3* 78.4*   9.9*   LYMPH 4.8*  0.9* 8.2*  1.0   MONO 6.3  1.2* 9.1  1.2*   EOS 0.2 0.3     Recent Labs   Lab 11/08/24  0703 11/09/24  0417 11/11/24  0833   * 132* 131*   K 4.5 4.2 4.5   CL 95 95 92*   CO2 22* 25 25   BUN 24* 22* 51*   CREATININE 3.8* 3.0* 5.4*   * 213* 96   CALCIUM 8.5* 8.4* 8.7   ALBUMIN 1.8* 1.8* 1.8*   PHOS 4.7* 3.1 4.1     Recent Labs   Lab 06/12/24  0837 06/22/24  0921 09/30/24  0840 10/29/24  0856   PROCAL 0.93*  --   --   --    LACTATE  --   --  0.8 1.6   FERRITIN  --  757*  --   --      Results for orders placed or performed during the hospital encounter of 10/25/24   Vitamin D    Collection Time: 10/28/24  5:29 AM   Result Value Ref Range    Vit D, 25-Hydroxy 9 (L) 30 - 96 ng/mL     SARS-CoV2 (COVID-19) Qualitative PCR (no units)   Date Value   10/29/2024 Detected (A)   12/22/2023 Presumptive Negative   05/03/2021 Not Detected   04/26/2021 Not Detected   09/05/2020 Not Detected     SARS-CoV-2 RNA, Amplification, Qual (no units)   Date Value   02/05/2024 Negative   11/20/2023 Negative   08/11/2022 Negative   01/24/2022 Negative   05/17/2021 Negative   03/09/2021 Negative   03/02/2021 Negative   08/17/2020 Negative   07/17/2020 Negative     POC Rapid COVID (no units)   Date Value   03/12/2022 Negative   01/31/2022 Negative   01/17/2022 Negative   12/21/2021 Negative   03/23/2021 Negative         Results for orders placed during the hospital encounter of 10/28/24    Echo Saline Bubble? No; Ultrasound enhancing contrast? No    Interpretation Summary    Left Ventricle: The left ventricle is normal in size. Mildly increased wall thickness. Regional wall motion abnormalities present. Septal motion is abnormal is consistent with bundle branch block.    Right Ventricle: Severe right ventricular enlargement. Systolic function is severely reduced.    Left Atrium: Atrial septum is bulging to the left.    Right Atrium: Right atrium is severely dilated.    Aortic Valve: The aortic valve is a  trileaflet valve. There is mild aortic valve sclerosis. There is mild aortic regurgitation with a centrally directed jet.    Mitral Valve: There is mild regurgitation with a centrally directed jet.    Tricuspid Valve: There is severe regurgitation with a centrally directed jet.    Pulmonary Artery: No pulmonary hypertension. The estimated pulmonary artery systolic pressure is 26 mmHg.    IVC/SVC: Elevated venous pressure at 15 mmHg.      Echo Saline Bubble? No; Ultrasound enhancing contrast? No    Left Ventricle: The left ventricle is normal in size. Mildly increased   wall thickness. Regional wall motion abnormalities present. Septal motion   is abnormal is consistent with bundle branch block.    Right Ventricle: Severe right ventricular enlargement. Systolic   function is severely reduced.    Left Atrium: Atrial septum is bulging to the left.    Right Atrium: Right atrium is severely dilated.    Aortic Valve: The aortic valve is a trileaflet valve. There is mild   aortic valve sclerosis. There is mild aortic regurgitation with a   centrally directed jet.    Mitral Valve: There is mild regurgitation with a centrally directed   jet.    Tricuspid Valve: There is severe regurgitation with a centrally   directed jet.    Pulmonary Artery: No pulmonary hypertension. The estimated pulmonary   artery systolic pressure is 26 mmHg.    IVC/SVC: Elevated venous pressure at 15 mmHg.      Pending Diagnostic Studies:       Procedure Component Value Units Date/Time    HEPATITIS B VIRAL DNA, QUANTITATIVE [[id number]] Collected: 11/12/24 1424    Order Status: Sent Lab Status: No result     Specimen: Blood            Medications:  Reconciled Home Medications:      Medication List        START taking these medications      amoxicillin-clavulanate 500-125mg 500-125 mg Tab  Commonly known as: AUGMENTIN  Take 1 tablet (500 mg total) by mouth 2 (two) times daily. for 5 days     cinacalcet 30 MG Tab  Commonly known as: SENSIPAR  Take 1  tablet (30 mg total) by mouth every Mon, Wed, Fri.  Start taking on: November 13, 2024     doxycycline 100 MG tablet  Commonly known as: VIBRA-TABS  Take 1 tablet (100 mg total) by mouth every 12 (twelve) hours. for 5 days     insulin glargine U-100 (Lantus) 100 unit/mL (3 mL) Inpn pen  Inject 5 Units into the skin every evening.  Replaces: LANTUS U-100 INSULIN 100 unit/mL injection     isosorbide dinitrate 10 MG tablet  Commonly known as: ISORDIL  Take 1 tablet (10 mg total) by mouth 2 (two) times daily.     Lactobacillus rhamnosus GG 10 billion cell capsule  Commonly known as: CULTURELLE  Take 1 capsule by mouth once daily.     oxyCODONE-acetaminophen 5-325 mg per tablet  Commonly known as: PERCOCET  Take 1 tablet by mouth every 8 (eight) hours as needed for Pain.     vitamin renal formula (B-complex-vitamin c-folic acid) 1 mg Cap  Commonly known as: NEPHROCAP  Take 1 capsule by mouth once daily.            CHANGE how you take these medications      acetaminophen 325 MG tablet  Commonly known as: TYLENOL  Take 2 tablets (650 mg total) by mouth every 8 (eight) hours as needed for Pain.  What changed:   medication strength  how much to take     carvediloL 6.25 MG tablet  Commonly known as: COREG  Take 0.5 tablets (3.125 mg total) by mouth 2 (two) times daily.  What changed: how much to take     hydrALAZINE 10 MG tablet  Commonly known as: APRESOLINE  Take 1 tablet (10 mg total) by mouth every 12 (twelve) hours.  What changed:   medication strength  how much to take  when to take this     insulin aspart U-100 100 unit/mL (3 mL) Inpn pen  Commonly known as: NovoLOG  Inject 0-10 Units into the skin before meals and at bedtime as needed (Hyperglycemia). **MODERATE CORRECTION DOSE**  Blood Glucose  mg/dL                  Pre-meal                2200  151-200                2 units                    1 unit  201-250                4 units                    2 units  251-300                6 units                    3  "units  301-350                8 units                    4 units  >350                     10 units                  5 units  Administer subcutaneously if needed at times designated by monitoring  schedule.  What changed:   how much to take  when to take this  reasons to take this  additional instructions     pregabalin 75 MG capsule  Commonly known as: LYRICA  Take 1 capsule (75 mg total) by mouth Daily.  What changed:   medication strength  how much to take  when to take this            CONTINUE taking these medications      allopurinoL 100 MG tablet  Commonly known as: ZYLOPRIM  Take 0.5 tablets (50 mg total) by mouth every other day.     apixaban 5 mg Tab  Commonly known as: ELIQUIS  Take 1 tablet (5 mg total) by mouth 2 (two) times daily.     atorvastatin 80 MG tablet  Commonly known as: LIPITOR  Take 1 tablet (80 mg total) by mouth once daily.     * blood sugar diagnostic Strp  1 each by Misc.(Non-Drug; Combo Route) route 3 (three) times daily.     * blood sugar diagnostic Strp  Use to check blood glucose 2 (two) times a day.     calcitRIOL 0.5 MCG Cap  Commonly known as: ROCALTROL  Take 1 capsule (0.5 mcg total) by mouth once daily.     clopidogreL 75 mg tablet  Commonly known as: PLAVIX  Take 1 tablet (75 mg total) by mouth once daily.     DEXCOM G7 SENSOR Radha  Generic drug: blood-glucose sensor  1 each by Misc.(Non-Drug; Combo Route) route once daily.     epoetin nina 4,000 unit/mL injection  Commonly known as: PROCRIT  Inject 1 mL (4,000 Units total) into the skin every Mon, Wed, Fri.     famotidine 20 MG tablet  Commonly known as: PEPCID  Take 1 tablet (20 mg total) by mouth once daily.     lancets 33 gauge Misc  TEST 3 TIMES DAILY     * pen needle, diabetic 33 gauge x 5/32" Ndle  1 each by Misc.(Non-Drug; Combo Route) route 3 (three) times daily.     * pen needle, diabetic 32 gauge x 5/32" Ndle  1 Units by Misc.(Non-Drug; Combo Route) route before meals as needed (SSI).     sertraline 100 MG " tablet  Commonly known as: ZOLOFT  Take 1 tablet (100 mg total) by mouth once daily.     sodium bicarbonate 650 MG tablet  Take 1 tablet (650 mg total) by mouth 3 (three) times daily.     TRUE METRIX GLUCOSE METER Misc  Generic drug: blood-glucose meter  TEST TWICE DAILY           * This list has 4 medication(s) that are the same as other medications prescribed for you. Read the directions carefully, and ask your doctor or other care provider to review them with you.                STOP taking these medications      calcium acetate(phosphat bind) 667 mg capsule  Commonly known as: PHOSLO     dextrose 40 % gel  Commonly known as: GLUTOSE     furosemide 40 MG tablet  Commonly known as: LASIX     LANTUS U-100 INSULIN 100 unit/mL injection  Generic drug: insulin glargine U-100 (Lantus)  Replaced by: insulin glargine U-100 (Lantus) 100 unit/mL (3 mL) Inpn pen     ONE DAILY MULTIVITAMIN per tablet  Generic drug: multivitamin     traZODone 50 MG tablet  Commonly known as: DESYREL     TRIAD WOUND DRESSING Pste  Generic drug: wound dressings              Indwelling Lines/Drains at time of discharge:   Lines/Drains/Airways       Central Venous Catheter Line  Duration                  Hemodialysis Catheter left subclavian -- days          Time spent on the discharge of patient: 45 minutes  This service was provided by Virtual Visit without a Telepresenter.   Patient was seen and examined on the date of discharge.  Additional time was spent speaking with consultants and case management, reviewing records, and/or discussing the plan of care with patient/family.    The patient location: [place] [address] [place]  Admitted 10/28/2024  9:02 PM  Patient was transferred to [facility] Medicine on:  11/09/24   This document was partially prepared by chart review and may not directly reflect my personal knowledge of the patient's case, clinical course, or significant events during the hospital stay.    The attending portion of this evaluation,  treatment, and documentation was performed per Aurora Feliciano MD via Telemedicine AudioVisual using the secure [employer] software platform with 2 way audio/video. The provider was located off-site and the patient is located in the hospital. The aforementioned video software was utilized to document the relevant history and physical exam    Aurora Feliciano MD  Department of [facility] Medicine  [facility]

## 2024-11-12 NOTE — PROGRESS NOTES
[facility] Medicine  Telemedicine Progress Note    Patient Name: Suyapa Connelly  MRN: [id number]  Patient Class: IP- Inpatient   Admission Date: 10/28/2024  Length of Stay: 14 days  Attending Physician: Aurora Feliciano MD  Primary Care Provider: Vanessa Noel MD    Subjective:     Principal Problem:Cellulitis of left thigh    HPI:  Patient is a 59-year-old female with a known past medical history of hypertension, hyperlipidemia, diabetes mellitus type 2, ESRD on HD, and deconditioning that presents the ER after being on floor throughout the evening.     Patient presents after being found on the floor by a neighbor today.  Patient reports that she got out of bed to change her pad and diaper but was unable to get back into bed, staying on the floor throughout the evening.  Patient's  who normally cares for her, was arrested yesterday and is in shelter and patient was unable to care for herself.  When patient was found by neighbor on floor, EMS was called and patient was brought to the ER for further evaluation.  On arrival to ER, patient in no acute distress.  Labs essentially within normal limits.  Patient last dialyzed on Thursday and no signs of fluid overload or electrolyte abnormalities.  Patient has no family NS able to assist her at home other than her  who is currently incarcerated.  Patient admitted for social problems and hemodialysis.    Patient due to her open wounds and CT finding of her left thigh - emphysema can not rule out infection -     Overview/Hospital Course:  No notes on file    Interval History: Virtual follow up visit for suspected Leg wound, left [S81.802A]  Wound infection [T14.8XXA, L08.9] present on admission.   This service was provided by telemedicine.    The patient location is: K404/K404 A   Admitted 10/28/2024  9:02 PM    CC: L thigh cellulitis    The patient is able to provide adequate history. History was obtained from patient and past  medical records.   No significant events overnight reported.  Patient complains of nothing new      Data  Details     [x]   Lab results reviewed 11/11/2024  BGs > goal but improved. Hyponatremia. Phos = 4.1. WBC increased slightly today. H&H stable.    []   Micro reports reviewed 11/11/2024      []   Pathology reports reviewed 11/11/2024     []   Imaging reports reviewed 11/11/2024     []   Cardiology Procedure reports reviewed 11/11/2024     []   Non- records/CareEverywhere notes reviewed 11/11/2024      []  Tests/studies orders placed or verified 11/11/2024       []  Independently viewed/assessed 11/11/2024     [x]  11/11/2024 Discussion of:   DC plan with CM     Review of Systems   Constitutional:  Positive for activity change. Negative for fever.   Respiratory:  Negative for shortness of breath.      Objective:     Vital Signs (Most Recent):  Temp: 98.3 °F (36.8 °C) (11/11/24 0852)  Pulse: 90 (11/11/24 1042)  Resp: 16 (11/11/24 0852)  BP: 122/78 (11/11/24 1042)  SpO2: 95 % (11/11/24 0758) Vital Signs (24h Range):  Temp:  [97.5 °F (36.4 °C)-99 °F (37.2 °C)] 98.3 °F (36.8 °C)  Pulse:  [88-97] 90  Resp:  [16-20] 16  SpO2:  [92 %-99 %] 95 %  BP: ()/(37-78) 122/78     Weight: 70.8 kg (156 lb 1.4 oz)  Body mass index is 47.63 kg/m².    Intake/Output Summary (Last 24 hours) at 11/11/2024 1114  Last data filed at 11/10/2024 1735  Gross per 24 hour   Intake 720 ml   Output 1 ml   Net 719 ml        Physical Exam  Constitutional:       General: She is awake.   Cardiovascular:      Comments: Monitor and/or Vital signs reviewed at time of visit  Pulmonary:      Effort: Pulmonary effort is normal. No accessory muscle usage or respiratory distress.   Musculoskeletal:      Right Lower Extremity: Right leg is amputated above knee.      Left Lower Extremity: Left leg is amputated above knee.   Neurological:      Mental Status: She is alert. She is not disoriented.   Psychiatric:         Attention and Perception: Attention  normal.         Behavior: Behavior is cooperative.         Significant Labs:   Recent Labs   Lab 07/08/24  0000 10/25/24  1501   HGBA1C 6.4* 5.9*     Recent Labs   Lab 11/10/24  1611 11/10/24  2043 11/11/24  0607   POCTGLUCOSE 235* 210* 153*     Recent Labs   Lab 10/29/24  1031   TSH 3.304     Recent Labs   Lab 11/07/24  1732 11/08/24  0703 11/11/24  0833   WBC 12.67 11.75 14.59*   HGB 7.7* 8.1* 7.7*   HCT 23.5* 25.2* 24.0*    397 386     Recent Labs   Lab 11/06/24  1949 11/07/24  1732   GRAN 86.1*  16.3* 78.4*  9.9*   LYMPH 4.8*  0.9* 8.2*  1.0   MONO 6.3  1.2* 9.1  1.2*   EOS 0.2 0.3     Recent Labs   Lab 11/08/24  0703 11/09/24  0417 11/11/24  0833   * 132* 131*   K 4.5 4.2 4.5   CL 95 95 92*   CO2 22* 25 25   BUN 24* 22* 51*   CREATININE 3.8* 3.0* 5.4*   * 213* 96   CALCIUM 8.5* 8.4* 8.7   ALBUMIN 1.8* 1.8* 1.8*   PHOS 4.7* 3.1 4.1     Recent Labs   Lab 06/12/24  0837 06/22/24  0921 09/30/24  0840 10/29/24  0856   PROCAL 0.93*  --   --   --    LACTATE  --   --  0.8 1.6   FERRITIN  --  757*  --   --      Results for orders placed or performed during the hospital encounter of 10/25/24   Vitamin D    Collection Time: 10/28/24  5:29 AM   Result Value Ref Range    Vit D, 25-Hydroxy 9 (L) 30 - 96 ng/mL     SARS-CoV2 (COVID-19) Qualitative PCR (no units)   Date Value   10/29/2024 Detected (A)   12/22/2023 Presumptive Negative   05/03/2021 Not Detected   04/26/2021 Not Detected   09/05/2020 Not Detected     SARS-CoV-2 RNA, Amplification, Qual (no units)   Date Value   02/05/2024 Negative   11/20/2023 Negative   08/11/2022 Negative   01/24/2022 Negative   05/17/2021 Negative   03/09/2021 Negative   03/02/2021 Negative   08/17/2020 Negative   07/17/2020 Negative     POC Rapid COVID (no units)   Date Value   03/12/2022 Negative   01/31/2022 Negative   01/17/2022 Negative   12/21/2021 Negative   03/23/2021 Negative     Results for orders placed during the hospital encounter of 10/28/24    Echo  Saline Bubble? No; Ultrasound enhancing contrast? No    Interpretation Summary    Left Ventricle: The left ventricle is normal in size. Mildly increased wall thickness. Regional wall motion abnormalities present. Septal motion is abnormal is consistent with bundle branch block.    Right Ventricle: Severe right ventricular enlargement. Systolic function is severely reduced.    Left Atrium: Atrial septum is bulging to the left.    Right Atrium: Right atrium is severely dilated.    Aortic Valve: The aortic valve is a trileaflet valve. There is mild aortic valve sclerosis. There is mild aortic regurgitation with a centrally directed jet.    Mitral Valve: There is mild regurgitation with a centrally directed jet.    Tricuspid Valve: There is severe regurgitation with a centrally directed jet.    Pulmonary Artery: No pulmonary hypertension. The estimated pulmonary artery systolic pressure is 26 mmHg.    IVC/SVC: Elevated venous pressure at 15 mmHg.      Echo Saline Bubble? No; Ultrasound enhancing contrast? No    Left Ventricle: The left ventricle is normal in size. Mildly increased   wall thickness. Regional wall motion abnormalities present. Septal motion   is abnormal is consistent with bundle branch block.    Right Ventricle: Severe right ventricular enlargement. Systolic   function is severely reduced.    Left Atrium: Atrial septum is bulging to the left.    Right Atrium: Right atrium is severely dilated.    Aortic Valve: The aortic valve is a trileaflet valve. There is mild   aortic valve sclerosis. There is mild aortic regurgitation with a   centrally directed jet.    Mitral Valve: There is mild regurgitation with a centrally directed   jet.    Tricuspid Valve: There is severe regurgitation with a centrally   directed jet.    Pulmonary Artery: No pulmonary hypertension. The estimated pulmonary   artery systolic pressure is 26 mmHg.    IVC/SVC: Elevated venous pressure at 15 mmHg.      Labs and Imaging listed above  were reviewed.     Assessment/Plan:      * Cellulitis of left thigh  L AKA- necrotic amputation surgical site- developing eschar 9x15x0.2cm and blisters to periwound  Continue IV antibiotics   General surgery consulted for wound debridement. Recs: continue recommendations per Wound Care   - Aquacel AG daily to L AKA    ID recs:   Continue vancomycin/ piperacillin tazobactam, can be switched to Augmentin and doxy on discharge for total of 14 days  Given one dose of metronidazole.  Difficult to determine length of therapy at this time but related to treatment response. Unfortunately, the aspect of the ischemic lesions complicate assessment of recovery.  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  Patient is status post CABG as well as stents that have clotted in her lower extremities    Pain control-- continue current regimen as BP allows  Cautious with opioids secondary to bradypnea / decreased respiratory rate    Pressure injury of sacral region, unstageable  Wound care    - Pressure injury prevention interventions   - WCON consulted    Morbid obesity with BMI of 50.0-59.9, adult  Body mass index is 47.63 kg/m². Morbid obesity complicates all aspects of disease management from diagnostic modalities to treatment.     ESRD (end stage renal disease)  Nephrology consulted for HD.    S/P AKA (above knee amputation) bilateral  Fall precautions   Having phantom limb pain; increased Lyrica to BID    Septic shock  This patient had shock. The type of shock is distributive due to sepsis. She was admitted to the intensive care unit 10/29.   Patient did not require IVF resuscitation or pressors. BP recovered.  Stepped down 10/31.   Continue IV abx. For 14 days total  Changed to po Augmentin and doxycyline to complete course    Chronic combined systolic and diastolic congestive heart failure  Patient has Combined Systolic and Diastolic heart failure that is Chronic. On presentation their CHF was well compensated.      Latest ECHO  Results for orders placed during the hospital encounter of 05/07/24    Echo    Interpretation Summary    Left Ventricle: The left ventricle is mildly dilated. Ventricular mass is normal. Normal wall thickness. Severe global hypokinesis present. There is severely reduced systolic function. Ejection fraction by visual approximation is 15%. There is diastolic dysfunction.    Right Ventricle: Severe right ventricular enlargement. Wall thickness is normal. Right ventricle wall motion has global hypokinesis. Systolic function is mildly reduced.    Left Atrium: Left atrium is severely dilated. There is an atrial septal aneurysm.    Right Atrium: Right atrium is dilated.    Aortic Valve: There is mild aortic regurgitation.    Mitral Valve: There is mild regurgitation.    Tricuspid Valve: There is annular dilation present. There is normal leaflet mobility. There is severe functional regurgitation.    Pulmonary Artery: The estimated pulmonary artery systolic pressure is at least 24 mmHg. PASP is likely under-estimated in the setting of severe tricuspid regurgitation.    IVC/SVC: Central venous pressure of at least 8 mmHg.    No vegetation seen.    Current Heart Failure Medications  carvedilol (COREG) tablet, 2 times daily, Oral    - Monitor strict I&Os and daily weights.    - Place on telemetry  - Low sodium diet  - Place on fluid restriction of 1 L.   - Cardiology has not been consulted  - The patient's volume status is stable but not at their baseline as indicated by edema- UF with HD as tolerated.   - Lasix stopped 10/30 due to low BP    Anemia due to chronic kidney disease, on chronic dialysis  Anemia is likely due to acute blood loss which was from bleeding from L stump wound . Most recent hemoglobin and hematocrit are listed below.  Recent Labs     11/11/24  0833   HGB 7.7*   HCT 24.0*       - Monitor serial CBC   - Transfuse PRBC if patient becomes hemodynamically unstable, symptomatic or H/H drops  below 7/21.  - Patient has received 3 units of PRBCs on 11/3  and one unit on 11/6  - Patient's anemia is currently stable  - EGD without identifiable source of bleeding  - H&H stable    Peripheral vascular disease  On Eliquis / Plavix--- held due to bleeding from the stump which resolved and possible GI bleed but EGD did not show any source of bleeding; just friable gastric mucosa  resumed Plavix and monitoring before resuming apixaban  Resume apixaban at discharge      Social Drivers of Health with Concerns     Tobacco Use: Medium Risk (10/25/2024)    Patient History     Smoking Tobacco Use: Former     Smokeless Tobacco Use: Never     Passive Exposure: Not on file   Financial Resource Strain: Medium Risk (10/29/2024)    Overall Financial Resource Strain (CARDIA)     Difficulty of Paying Living Expenses: Somewhat hard   Physical Activity: Inactive (10/29/2024)    Exercise Vital Sign     Days of Exercise per Week: 0 days     Minutes of Exercise per Session: 0 min   Stress: Stress Concern Present (10/29/2024)    [employer] - Occupational Stress Questionnaire     Feeling of Stress : To some extent   Health Literacy: Adequate Health Literacy (10/29/2024)     Health Literacy     Frequency of need for help with medical instructions: Rarely   Recent Concern: Health Literacy - Inadequate Health Literacy (10/28/2024)     Health Literacy     Frequency of need for help with medical instructions: Sometimes       Active Hospital Problems    Diagnosis  POA    *Cellulitis of left thigh [L03.116]  Yes    Pressure injury of sacral region, unstageable [L89.150]  Yes    Morbid obesity with BMI of 50.0-59.9, adult [E66.01, Z68.43]  Not Applicable    ESRD (end stage renal disease) [N18.6]  Yes    S/P AKA (above knee amputation) bilateral [Z89.611, Z89.612]  Not Applicable    Septic shock [A41.9, R65.21]  Yes    Chronic combined systolic and diastolic congestive heart failure [I50.42]  Yes     Anemia due to chronic kidney disease, on chronic dialysis [N18.6, D63.1, Z99.2]  Not Applicable    Peripheral vascular disease [I73.9]  Yes      Resolved Hospital Problems    Diagnosis Date Resolved POA    Metabolic acidosis [E87.20] 10/29/2024 Yes       Inpatient Medications Prescribed for Management of Current Problems:     Scheduled Meds:   amoxicillin-clavulanate 500-125mg  1 tablet Oral BID    atorvastatin  80 mg Oral Daily    calcitRIOL  0.5 mcg Oral Daily    cinacalcet  30 mg Oral Every Mon, Wed, Fri    doxycycline  100 mg Oral Q12H    insulin glargine U-100  5 Units Subcutaneous QHS    pantoprazole  40 mg Oral Daily    pregabalin  50 mg Oral BID    sertraline  100 mg Oral Daily    sodium bicarbonate  650 mg Oral TID    vitamin renal formula (B-complex-vitamin c-folic acid)  1 capsule Oral Daily     Continuous Infusions:  PRN Meds:.  Current Facility-Administered Medications:     0.9%  NaCl infusion (for blood administration), , Intravenous, Q24H PRN    0.9%  NaCl infusion (for blood administration), , Intravenous, Q24H PRN    0.9% NaCl, , Intravenous, PRN    acetaminophen, 650 mg, Oral, Q4H PRN    dextrose 10%, 12.5 g, Intravenous, PRN    dextrose 10%, 25 g, Intravenous, PRN    glucagon (human recombinant), 1 mg, Intramuscular, PRN    glucose, 16 g, Oral, PRN    glucose, 24 g, Oral, PRN    heparin (porcine), 3,600 Units, Intravenous, PRN    insulin aspart U-100, 0-10 Units, Subcutaneous, QID (AC + HS) PRN    melatonin, 6 mg, Oral, Nightly PRN    naloxone, 0.02 mg, Intravenous, PRN    ondansetron, 4 mg, Intravenous, Q8H PRN    oxyCODONE, 10 mg, Oral, Q12H PRN    oxyCODONE, 5 mg, Oral, Q6H PRN    polyethylene glycol, 17 g, Oral, PRN    senna-docusate 8.6-50 mg, 1 tablet, Oral, BID PRN    sodium chloride 0.9%, 250 mL, Intravenous, PRN    sodium chloride 0.9%, 10 mL, Intravenous, Q8H PRN    VTE Risk Mitigation (From admission, onward)           Ordered     IP VTE HIGH RISK PATIENT  Once         10/28/24 2114      Place sequential compression device  Until discontinued         10/28/24 2114     Reason for No Pharmacological VTE Prophylaxis  Once        Question:  Reasons:  Answer:  Already adequately anticoagulated on oral Anticoagulants    10/28/24 2114     heparin (porcine) injection 3,600 Units  As needed (PRN)         10/28/24 2114                  I have completed this tele-visit without the assistance of a telepresenter.    The attending portion of this evaluation, treatment, and documentation was performed per Aurora Feliciano MD via Telemedicine AudioVisual using the secure [employer] software platform with 2 way audio/video. The provider was located off-site and the patient is located in the hospital. The aforementioned video software was utilized to document the relevant history and physical exam    I spent a total of 51 minutes on the day of the visit.This includes face to face time and non-face to face time preparing to see the patient (eg, review of tests), obtaining and/or reviewing separately obtained history, documenting clinical information in the electronic or other health record, independently interpreting results and communicating results to the patient/family/caregiver, or care coordinator.      Aurora Feliciano MD  Department of Hospital Medicine   [facility]

## 2024-11-12 NOTE — PLAN OF CARE
"0810  DC order noted. Orders sent to [facility] via [facility]. Awaiting response.     0820  Message left for Veronica ([phone], ext [phone]) w/[facility] questioning the pt's HD chair transfer status. Awaiting response.     0840  CRYSTAL received a return call from Veronica stating that [facility] is questioning the pt's Hep B status. CM informed Veronica of (-) Hep B results sent yesterday.     1105  Patient resting quietly in bed with Maria Esther from [facility] at the bedside when CM rounded. No family present. Maria Esther is assisting the pt in completing the admission paperwork. Patient in agreement with plan to discharge to [facility] today & will need assistance with transportation at time of discharge. Signed "Pt Choice" form placed in the pt's chart.     1200  Email sent to Jackie edwards/Jamal questioning status of requested HD chair while the pt is at [facility] & informing that the pt is medically stable to discharge. Awaiting response.     1255  CRYSTAL was informed by  ([phone]) [facility] that documentation from an MD stating that the pt is Hep B negative is required prior to the HD chair transfer being approved. Message sent to Dr Feliciano informing of above. Awaiting response.     1310  Email sent to Veronica &  informing that the pt was (+) Hep B when she had covid & that she is ordering a stat Hep B viral load. Awaiting results.     1410  CRYSTAL informed the lab dept of stat lab ordered.     1435  Message sent to nurse Barry informing of above.     CRYSTAL received a call from Mariann ([phone]) w/[facility] questioning the pt's discharge status. Update given.     1530  CRYSTAL was informed by Veronica edwards/[facility] that the pt has been accepted for HD chair transfer to [facility] 3rd shift with start tomorrow. Veronica to email this CM the "Welcome Letter".    1544  Message sent to Dr Feliciano & CRYSTAL informed Mariann /[facility] of above.    Patient resting quietly in bed when CRYSTAL " "rounded. No family present. Patient in agreement with plan to discharge to [facility] today & of HD chair at [facility] w/start date tomorrow.    CM informed nurse Barry & the pt's cousin, Jen Arias ([phone]), via phone of above. Jen verbalized understanding & agreement.     1600  HD "Welcome Letter" & CXR sent to [facility]. Awaiting room assignment.     1620  Ambulance transportation scheduled with requested pickup at 1730.     Message sent to nurse Barry, charge nurse Aspen, & virtual nurse Edilberto informing of the pt's discharge status.     1640  Message sent to nurse Barry informing of ambulance ETA 1930.    1700  CM was informed by [facility] that the pt will be accepted to room 5A. Message sent to nurse Barry informing of above & requested that report be called.       Will continue to follow.   "

## 2024-11-12 NOTE — CARE UPDATE
11/11/24 9856   Preset CPAP/BiPAP Settings   Mode Of Delivery BiPAP;Standby   CPAP/BIPAP charged w/in last 24 h YES   $ Initial CPAP/BiPAP Setup? No   $ Is patient using? No/refused   Reason patient is not wearing? Patient refused

## 2024-11-12 NOTE — PROGRESS NOTES
[facility] ID note    Patient afebrile.  On doxycycline amoxicillin clavulanic acid    Would complete antibiotic therapy soon.  Scheduled to get 5 more days upon transfer.    We will sign off.    Please call if any questions   Juarez Gamboa  [facility] ID  [phone]

## 2024-11-12 NOTE — PT/OT/SLP PROGRESS
Physical Therapy Treatment    Patient Name:  Suyapa Connelly   MRN:  [id number]    Recommendations:     Discharge Recommendations: Moderate Intensity Therapy  Discharge Equipment Recommendations:  (TBD next level of care)  Barriers to discharge:  decreased mobility,strength and endurance    Assessment:     Suyapa Connelly is a 59 y.o. female admitted with a medical diagnosis of Cellulitis of left thigh.  She presents with the following impairments/functional limitations: weakness, impaired endurance, impaired functional mobility, impaired balance, decreased lower extremity function, decreased upper extremity function, pain, decreased ROM, impaired coordination, impaired skin,pt with good participation and requires assistance with mobility at this time,pt will benefit from moderate intensity upon discharge.    Rehab Prognosis: Fair; patient would benefit from acute skilled PT services to address these deficits and reach maximum level of function.    Recent Surgery: Procedure(s) (LRB):  EGD (ESOPHAGOGASTRODUODENOSCOPY) (N/A) 5 Days Post-Op    Plan:     During this hospitalization, patient to be seen 3 x/week to address the identified rehab impairments via therapeutic activities, therapeutic exercises, neuromuscular re-education, wheelchair management/training and progress toward the following goals:    Plan of Care Expires:  12/14/24    Subjective     Chief Complaint: n/a  Patient/Family Comments/goals: pt agreeable to rx.  Pain/Comfort:  Pain Rating 1: 8/10  Location - Side 1: Left  Location - Orientation 1: upper  Location 1: thigh  Pain Addressed 1: Reposition, Distraction  Pain Rating Post-Intervention 1: 8/10      Objective:     Communicated with nsg prior to session.  Patient found supine with bed alarm, telemetry upon PT entry to room.     General Precautions: Standard, fall  Orthopedic Precautions: N/A  Braces: N/A  Respiratory Status: Room air     Functional Mobility:  Bed Mobility:     Supine to Sit: stand  by assistance  Sit to Supine: minimum assistance  Balance: fair sitting balance      AM-PAC 6 CLICK MOBILITY  Turning over in bed (including adjusting bedclothes, sheets and blankets)?: 3  Sitting down on and standing up from a chair with arms (e.g., wheelchair, bedside commode, etc.): 1  Moving from lying on back to sitting on the side of the bed?: 3  Moving to and from a bed to a chair (including a wheelchair)?: 1  Need to walk in hospital room?: 1  Climbing 3-5 steps with a railing?: 1  Basic Mobility Total Score: 10       Treatment & Education: pt sat EOB ~20 mins with SBA,le seated cervical ROM and trunk rotation with assistance,hygiene with OT.      Patient left supine with all lines intact, call button in reach, and nsg and pct notified..    GOALS: see general POC  Multidisciplinary Problems       Physical Therapy Goals          Problem: Physical Therapy    Goal Priority Disciplines Outcome Interventions   Physical Therapy Goal     PT, PT/OT Progressing    Description: Goals to be met by: 24     Patient will increase functional independence with mobility by performin. Supine to sit with Moderate Assistance  2. Sit to supine with Moderate Assistance  3. Rolling to Left and Right with Minimal Assistance.  4. Bed to chair transfer with Moderate Assistance using Slideboard  5. Wheelchair propulsion x 25 feet with Minimal Assistance using bilateral upper extremities                         Time Tracking:     PT Received On: 24  PT Start Time: 954     PT Stop Time: 1024  PT Total Time (min): 30 min     Billable Minutes: Therapeutic Activity 30       PT/PTA: PTA     Number of PTA visits since last PT visit: 2     2024

## 2024-11-12 NOTE — ASSESSMENT & PLAN NOTE
Patient has Combined Systolic and Diastolic heart failure that is Chronic. On presentation their CHF was well compensated.     Latest ECHO  Results for orders placed during the hospital encounter of 05/07/24    Echo    Interpretation Summary    Left Ventricle: The left ventricle is mildly dilated. Ventricular mass is normal. Normal wall thickness. Severe global hypokinesis present. There is severely reduced systolic function. Ejection fraction by visual approximation is 15%. There is diastolic dysfunction.    Right Ventricle: Severe right ventricular enlargement. Wall thickness is normal. Right ventricle wall motion has global hypokinesis. Systolic function is mildly reduced.    Left Atrium: Left atrium is severely dilated. There is an atrial septal aneurysm.    Right Atrium: Right atrium is dilated.    Aortic Valve: There is mild aortic regurgitation.    Mitral Valve: There is mild regurgitation.    Tricuspid Valve: There is annular dilation present. There is normal leaflet mobility. There is severe functional regurgitation.    Pulmonary Artery: The estimated pulmonary artery systolic pressure is at least 24 mmHg. PASP is likely under-estimated in the setting of severe tricuspid regurgitation.    IVC/SVC: Central venous pressure of at least 8 mmHg.    No vegetation seen.    Current Heart Failure Medications  hydrALAZINE tablet 10 mg, Every 12 hours, Oral  isosorbide dinitrate tablet 10 mg, 2 times daily, Oral  carvediloL tablet 3.125 mg, 2 times daily, Oral    - Monitor strict I&Os and daily weights.    - Place on telemetry  - Low sodium diet  - Place on fluid restriction of 1 L.   - Cardiology has not been consulted  - The patient's volume status is stable but not at their baseline as indicated by edema- UF with HD as tolerated.   - Lasix stopped 10/30 due to low BP

## 2024-11-12 NOTE — PROGRESS NOTES
Wound care follow up:  Pt sitting up on side of bed with PT. Dressing to L AKA in place and changed yesterday. Skin breakdown to gluteal folds continue- see photos in media- recommend to continue with Triad ointment to gluteal folds BID and prn incontinent episodes. Pt plans for possible transfer today.

## 2024-11-13 ENCOUNTER — HOSPITAL ENCOUNTER (EMERGENCY)
Facility: HOSPITAL | Age: 59
Discharge: HOME | End: 2024-11-14
Attending: STUDENT IN AN ORGANIZED HEALTH CARE EDUCATION/TRAINING PROGRAM
Payer: MEDICARE

## 2024-11-13 ENCOUNTER — PATIENT OUTREACH (OUTPATIENT)
Dept: ADMINISTRATIVE | Facility: HOSPITAL | Age: 59
End: 2024-11-13
Payer: MEDICARE

## 2024-11-13 DIAGNOSIS — S81.802D WOUND OF LEFT LOWER EXTREMITY, SUBSEQUENT ENCOUNTER: Primary | ICD-10-CM

## 2024-11-13 DIAGNOSIS — N18.6 ESRD (END STAGE RENAL DISEASE): ICD-10-CM

## 2024-11-13 DIAGNOSIS — R52 PAIN: ICD-10-CM

## 2024-11-13 LAB
ALBUMIN SERPL BCP-MCNC: 2 G/DL (ref 3.5–5.2)
ALP SERPL-CCNC: 372 U/L (ref 40–150)
ALT SERPL W/O P-5'-P-CCNC: 17 U/L (ref 10–44)
ANION GAP SERPL CALC-SCNC: 10 MMOL/L (ref 8–16)
AST SERPL-CCNC: 29 U/L (ref 10–40)
BASOPHILS # BLD AUTO: 0.12 K/UL (ref 0–0.2)
BASOPHILS NFR BLD: 0.9 % (ref 0–1.9)
BILIRUB SERPL-MCNC: 0.4 MG/DL (ref 0.1–1)
BUN SERPL-MCNC: 15 MG/DL (ref 6–20)
CALCIUM SERPL-MCNC: 8.4 MG/DL (ref 8.7–10.5)
CHLORIDE SERPL-SCNC: 101 MMOL/L (ref 95–110)
CO2 SERPL-SCNC: 22 MMOL/L (ref 23–29)
CREAT SERPL-MCNC: 2.1 MG/DL (ref 0.5–1.4)
DIFFERENTIAL METHOD BLD: ABNORMAL
EOSINOPHIL # BLD AUTO: 0.3 K/UL (ref 0–0.5)
EOSINOPHIL NFR BLD: 1.8 % (ref 0–8)
ERYTHROCYTE [DISTWIDTH] IN BLOOD BY AUTOMATED COUNT: 19.7 % (ref 11.5–14.5)
EST. GFR  (NO RACE VARIABLE): 26.8 ML/MIN/1.73 M^2
GLUCOSE SERPL-MCNC: 142 MG/DL (ref 70–110)
HBV DNA SERPL NAA+PROBE-ACNC: NOT DETECTED IU/ML
HCT VFR BLD AUTO: 24 % (ref 37–48.5)
HEPATITIS B VIRUS DNA: NORMAL
HGB BLD-MCNC: 7.7 G/DL (ref 12–16)
IMM GRANULOCYTES # BLD AUTO: 0.08 K/UL (ref 0–0.04)
IMM GRANULOCYTES NFR BLD AUTO: 0.6 % (ref 0–0.5)
LYMPHOCYTES # BLD AUTO: 0.6 K/UL (ref 1–4.8)
LYMPHOCYTES NFR BLD: 4.4 % (ref 18–48)
MCH RBC QN AUTO: 32 PG (ref 27–31)
MCHC RBC AUTO-ENTMCNC: 32.1 G/DL (ref 32–36)
MCV RBC AUTO: 100 FL (ref 82–98)
MONOCYTES # BLD AUTO: 1.5 K/UL (ref 0.3–1)
MONOCYTES NFR BLD: 11 % (ref 4–15)
NEUTROPHILS # BLD AUTO: 11.3 K/UL (ref 1.8–7.7)
NEUTROPHILS NFR BLD: 81.3 % (ref 38–73)
NRBC BLD-RTO: 0 /100 WBC
PLATELET # BLD AUTO: 393 K/UL (ref 150–450)
PMV BLD AUTO: 11.2 FL (ref 9.2–12.9)
POTASSIUM SERPL-SCNC: 3.6 MMOL/L (ref 3.5–5.1)
PROT SERPL-MCNC: 8.3 G/DL (ref 6–8.4)
RBC # BLD AUTO: 2.41 M/UL (ref 4–5.4)
SODIUM SERPL-SCNC: 133 MMOL/L (ref 136–145)
WBC # BLD AUTO: 13.88 K/UL (ref 3.9–12.7)

## 2024-11-13 PROCEDURE — 80053 COMPREHEN METABOLIC PANEL: CPT | Mod: HCNC

## 2024-11-13 PROCEDURE — 93010 ELECTROCARDIOGRAM REPORT: CPT | Mod: HCNC,,, | Performed by: INTERNAL MEDICINE

## 2024-11-13 PROCEDURE — 86140 C-REACTIVE PROTEIN: CPT | Mod: HCNC

## 2024-11-13 PROCEDURE — 85025 COMPLETE CBC W/AUTO DIFF WBC: CPT | Mod: HCNC

## 2024-11-13 PROCEDURE — 99285 EMERGENCY DEPT VISIT HI MDM: CPT | Mod: 25,HCNC

## 2024-11-13 PROCEDURE — 63600175 PHARM REV CODE 636 W HCPCS: Mod: HCNC

## 2024-11-13 PROCEDURE — 93005 ELECTROCARDIOGRAM TRACING: CPT | Mod: HCNC

## 2024-11-13 PROCEDURE — 96375 TX/PRO/DX INJ NEW DRUG ADDON: CPT | Mod: HCNC

## 2024-11-13 RX ORDER — MORPHINE SULFATE 4 MG/ML
4 INJECTION, SOLUTION INTRAMUSCULAR; INTRAVENOUS
Status: COMPLETED | OUTPATIENT
Start: 2024-11-13 | End: 2024-11-13

## 2024-11-13 RX ADMIN — MORPHINE SULFATE 4 MG: 4 INJECTION INTRAVENOUS at 09:11

## 2024-11-13 NOTE — PLAN OF CARE
[facility] - Telemetry  Discharge Final Note    Primary Care Provider: Vanessa Noel MD    Expected Discharge Date: 11/12/2024    Final Discharge Note (most recent)       Final Note - 11/13/24 0725          Final Note    Assessment Type Final Discharge Note (P)      Anticipated Discharge Disposition Skilled Nursing Facility (P)    [facility]    Hospital Resources/Appts/Education Provided Appointments scheduled and added to AVS (P)         Post-Acute Status    Post-Acute Authorization Placement;Dialysis (P)      Post-Acute Placement Status Set-up Complete/Auth obtained (P)    [facility]    Diaylsis Status Set-up Complete/Auth obtained (P)    HD chair transferred to [facility]    Discharge Delays PFC Arranged Transportation (P)                      I  Contact Info       Vanessa Noel MD   Specialty: Internal Medicine   Relationship: PCP - General    [facility] [place] [id number]   Phone: [phone]       Next Steps: Follow up in 2 week(s)    Instructions: For discharge from hospital follow up    Juarez Gamboa MD   Specialty: Infectious Diseases    [address] [place] [id number]   Phone: [phone]       Next Steps: Schedule an appointment as soon as possible for a visit    Instructions: For discharge from hospital follow up

## 2024-11-14 VITALS
DIASTOLIC BLOOD PRESSURE: 65 MMHG | OXYGEN SATURATION: 100 % | RESPIRATION RATE: 16 BRPM | SYSTOLIC BLOOD PRESSURE: 150 MMHG | TEMPERATURE: 98 F | WEIGHT: 156 LBS | BODY MASS INDEX: 47.6 KG/M2 | HEART RATE: 87 BPM

## 2024-11-14 PROBLEM — M79.652 LEFT THIGH PAIN: Status: ACTIVE | Noted: 2024-11-14

## 2024-11-14 LAB
CRP SERPL-MCNC: 61.1 MG/L (ref 0–8.2)
OHS QRS DURATION: 110 MS
OHS QTC CALCULATION: 502 MS

## 2024-11-14 PROCEDURE — 96365 THER/PROPH/DIAG IV INF INIT: CPT | Mod: HCNC

## 2024-11-14 PROCEDURE — 63600175 PHARM REV CODE 636 W HCPCS: Mod: HCNC

## 2024-11-14 PROCEDURE — 96366 THER/PROPH/DIAG IV INF ADDON: CPT | Mod: HCNC

## 2024-11-14 PROCEDURE — 96367 TX/PROPH/DG ADDL SEQ IV INF: CPT | Mod: HCNC

## 2024-11-14 PROCEDURE — 25000003 PHARM REV CODE 250: Mod: HCNC

## 2024-11-14 RX ADMIN — VANCOMYCIN HYDROCHLORIDE 1750 MG: 10 INJECTION, POWDER, LYOPHILIZED, FOR SOLUTION INTRAVENOUS at 01:11

## 2024-11-14 RX ADMIN — PIPERACILLIN AND TAZOBACTAM 4.5 G: 4; .5 INJECTION, POWDER, FOR SOLUTION INTRAVENOUS; PARENTERAL at 12:11

## 2024-11-14 NOTE — ED PROVIDER NOTES
Encounter Date: 11/13/2024       History     Chief Complaint   Patient presents with    Knee Pain     Left knee pain started a month ago,  came from dialysis.  PMH ESRD, bilateral amputations, HTN, DM     HPI    Suyapa Connelly is a 59 y.o. female with PMH of ESRD on dialysis, diabetes, hypertension, hyperlipidemia, status post bilateral AKA, presenting to [facility] ED for left knee pain.  Of note, patient had recent admission from 10/28 to 11/12 for septic shock for which she was admitted to ICU.  Patient was found to have cellulitis/calciphylaxis of the left thigh.  Patient was de-escalated to p.o. Augmentin and doxycycline and discharged to [facility] skilled nursing facility.  Per previous note, patient was previously cared for by her  who was arrested the day prior to her recent admission.  Patient is unable to provide reliable history.  Triage note states that patient came from dialysis for left knee pain.  Patient states that she has not received dialysis today.  Patient denies fever, nausea/vomiting, abdominal pain, chest pain, shortness of breath.      Review of patient's allergies indicates:   Allergen Reactions    Ciprofloxacin Itching    Iodine      Kidney injury    Pcn [penicillins]      Rash; tolerated ceftriaxone on 1/13/20     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    Osteomyelitis     Osteomyelitis of left foot 04/30/2021    Ulcer of left foot     Vaginal delivery     x1     Past Surgical History:   Procedure Laterality Date    ABOVE-KNEE AMPUTATION Left 5/18/2021    Procedure: AMPUTATION, ABOVE KNEE;  Surgeon: Teddy Huber MD;  Location: [facility] OR [address] [place] [address];  Service: Vascular;  Laterality: Left;    ABOVE-KNEE  AMPUTATION Right 3/18/2022    Procedure: AMPUTATION, ABOVE KNEE;  Surgeon: DAYNE Florez II, MD;  [facility];  Service: Vascular;  Laterality: Right;    Angiogram - Right Extremity Right 7/9/15    angiogram-left leg  10/6/15    ANGIOGRAPHY OF LOWER EXTREMITY Left 4/29/2021    Procedure: ANGIOGRAM, LOWER EXTREMITY;  Surgeon: Teddy Huber MD;  [facility];  Service: Vascular;  Laterality: Left;    BELOW KNEE AMPUTATION OF LOWER EXTREMITY Right 12/28/2021    Procedure: AMPUTATION, BELOW KNEE;  Surgeon: Kaitlyn Rojas MD;  Location: [facility] OR;  Service: General;  Laterality: Right;    CATHETERIZATION OF BOTH LEFT AND RIGHT HEART N/A 12/18/2019    Procedure: CATHETERIZATION, HEART, BOTH LEFT AND RIGHT;  Surgeon: Que Fernando III, MD;  Location: [facility] CATH LAB;  Service: Cardiology;  Laterality: N/A;    CORONARY ANGIOGRAPHY N/A 12/18/2019    Procedure: ANGIOGRAM, CORONARY ARTERY;  Surgeon: Que Fernando III, MD;  Location: [facility] CATH LAB;  Service: Cardiology;  Laterality: N/A;    CORONARY ANGIOGRAPHY INCLUDING BYPASS GRAFTS WITH CATHETERIZATION OF LEFT HEART N/A 7/28/2020    Procedure: ANGIOGRAM, CORONARY, INCLUDING BYPASS GRAFT, WITH LEFT HEART CATHETERIZATION, 9 am;  Surgeon: Rachel Easley MD;  Location: [facility] CATH LAB;  Service: Cardiology;  Laterality: N/A;    CORONARY ARTERY BYPASS GRAFT (CABG) N/A 1/14/2020    Procedure: CORONARY ARTERY BYPASS GRAFT (CABG) x 1     Off Pump;  Surgeon: Huang Altamirano MD;  Location: [address];  Service: Cardiovascular;  Laterality: N/A;    CREATION OF FEMORAL-TIBIAL ARTERY BYPASS Left 4/29/2021    Procedure: CREATION, BYPASS, ARTERIAL, FEMORAL TO ANTERIOR TIBIAL;  Surgeon: Teddy Huber MD;  [facility];  Service: Vascular;  Laterality: Left;    CREATION OF FEMOROPOPLITEAL ARTERIAL BYPASS USING GRAFT Left 8/18/2020    Procedure: CREATION, BYPASS, ARTERIAL, FEMORAL TO POPLITEAL, USING GRAFT, LEFT  LOWER EXTREMITY;  Surgeon: Teddy Huber MD;  Location: [facility];  Service: Vascular;  Laterality: Left;  REQUEST 7:15 A.M. START----COVID NEGATIVE ON 8/17 1ST CASE STARTE PER LEANA ON 8/7/2020 @ 942AM-LO  RN PREOP 8/12/2020   T/S-----CLEARED BY CARDS-------PENDING INSURANCE    DEBRIDEMENT OF FOOT Left 9/8/2020    Procedure: DEBRIDEMENT, FOOT;  Surgeon: Rosio Myaes DPM;  Location: [facility] OR;  Service: Podiatry;  Laterality: Left;  request neoxx .   RN Pre Op 9-4-2020, Covid negative on 9/5/20. C A    DEBRIDEMENT OF FOOT  3/4/2021    Procedure: DEBRIDEMENT, FOOT;  Surgeon: Teddy Huber MD;  Location: [facility];  Service: Vascular;;    DEBRIDEMENT OF FOOT Left 3/9/2021    Procedure: DEBRIDEMENT, FOOT, bone biopsy;  Surgeon: Rosio Mayes DPM;  Location: [facility] OR;  Service: Podiatry;  Laterality: Left;  Request neoxx---COVID IN AM  REQUESTING NOON START  RN Phone Pre op.On Blood thinners Plavix and Eliquis.  Covid am of surgery. C A    DEBRIDEMENT OF FOOT Left 5/4/2021    Procedure: DEBRIDEMENT, FOOT;  Surgeon: Farooq Morley DPM;  Location: [address];  Service: Podiatry;  Laterality: Left;    ESOPHAGOGASTRODUODENOSCOPY N/A 11/7/2024    Procedure: EGD (ESOPHAGOGASTRODUODENOSCOPY);  Surgeon: Yony Cancino MD;  Location: [facility] ENDO;  Service: Endoscopy;  Laterality: N/A;    INSERTION OF TUNNELED CENTRAL VENOUS HEMODIALYSIS CATHETER N/A 1/27/2020    Procedure: Insertion, Catheter, Central Venous, Hemodialysis;  Surgeon: ESTEBAN Gomez III, MD;  Location: [facility] CATH LAB;  Service: Peripheral Vascular;  Laterality: N/A;    INSERTION OF TUNNELED CENTRAL VENOUS HEMODIALYSIS CATHETER  5/10/2023    Procedure: Insertion, Catheter, Central Venous, Hemodialysis;  Surgeon: Romulo Queen MD;  Location: [facility] CATH LAB;  Service: Cardiology;;    PERCUTANEOUS TRANSLUMINAL ANGIOPLASTY N/A 3/4/2021    Procedure: PTA (ANGIOPLASTY, PERCUTANEOUS, TRANSLUMINAL);  Surgeon: Teddy Huber MD;   Location: [facility] OR;  Service: Vascular;  Laterality: N/A;    REMOVAL OF ARTERIOVENOUS GRAFT Left 5/27/2021    Procedure: REMOVAL, GRAFT, LEFT LOWER EXTREMITY, WOUND EXPLORATION;  Surgeon: Teddy Huber MD;  Location: [facility] OR 2ND FLR;  Service: Vascular;  Laterality: Left;    REMOVAL OF NAIL OF DIGIT Left 3/9/2021    Procedure: REMOVAL, NAIL, DIGIT;  Surgeon: Rosio Mayes DPM;  Location: [facility] OR;  Service: Podiatry;  Laterality: Left;    RIGHT HEART CATHETERIZATION Right 5/10/2023    Procedure: INSERTION, CATHETER, RIGHT HEART;  Surgeon: Romulo Queen MD;  Location: [facility] CATH LAB;  Service: Cardiology;  Laterality: Right;    THROMBECTOMY Left 3/4/2021    Procedure: THROMBECTOMY, LEFT LOWER EXTREMITY BYPASS GRAFT, ANGIOGRAM, POSSIBLE INTERVENTION, POSSIBLE LEFT LOWER EXTREMITY BYPASS;  Surgeon: Teddy Huber MD;  Location: [facility] OR;  Service: Vascular;  Laterality: Left;    THROMBECTOMY Left 4/29/2021    Procedure: GRAFT THROMBECTOMY, LEFT LOWER EXTREMITY;  Surgeon: Teddy Huber MD;  Location: [facility] OR 2ND FLR;  Service: Vascular;  Laterality: Left;  14.5 min  1179.85 mGy  341.01 Gycm2  240 ml dye    THROMBECTOMY  10/22/2021    Procedure: THROMBECTOMY;  Surgeon: Saad Arenas MD;  Location: [facility] CATH LAB/EP;  Service: Cardiology;;     Family History   Problem Relation Name Age of Onset    Diabetes Mother      Diabetes Father      Heart disease Maternal Grandmother      No Known Problems Maternal Grandfather      Diabetes Paternal Grandmother      No Known Problems Paternal Grandfather      Anesthesia problems Neg Hx       Social History     Tobacco Use    Smoking status: Former    Smokeless tobacco: Never   Substance Use Topics    Alcohol use: No    Drug use: Yes     Types: Marijuana     Comment: occassional     Review of Systems   Constitutional:  Negative for fever.   HENT:  Negative for congestion, rhinorrhea and sore throat.    Eyes:  Negative for visual disturbance.   Respiratory:   Negative for cough and shortness of breath.    Cardiovascular:  Negative for chest pain and leg swelling.   Gastrointestinal:  Negative for abdominal pain, diarrhea, nausea and vomiting.   Genitourinary:  Negative for dysuria and hematuria.   Musculoskeletal:  Positive for arthralgias and myalgias.   Skin:  Negative for rash.   Neurological:  Negative for weakness.       Physical Exam     Initial Vitals [11/13/24 1834]   BP Pulse Resp Temp SpO2   (!) 151/87 76 18 98.1 °F (36.7 °C) 100 %      MAP       --         Physical Exam    Nursing note and vitals reviewed.  Constitutional: She is cooperative.  Non-toxic appearance. She appears ill.   HENT:   Head: Normocephalic and atraumatic. Mouth/Throat: Mucous membranes are normal. Mucous membranes are not dry.   Eyes: Conjunctivae are normal.   Neck: Phonation normal.   Cardiovascular:  Normal rate, regular rhythm and normal heart sounds.     Exam reveals no gallop, no S3, no S4 and no friction rub.       No murmur heard.  Musculoskeletal:      Comments: Bilateral AKA  Wound to left stump has central eschar.  Mild amount of purulence.  Very tender to palpation.  No crepitus appreciated.     Neurological: She is alert.   Skin: Skin is warm, dry and intact. Capillary refill takes less than 2 seconds.   Psychiatric: She has a normal mood and affect. Her speech is normal.               ED Course   Procedures  Labs Reviewed   CBC W/ AUTO DIFFERENTIAL - Abnormal       Result Value    WBC 13.88 (*)     RBC 2.41 (*)     Hemoglobin 7.7 (*)     Hematocrit 24.0 (*)      (*)     MCH 32.0 (*)     MCHC 32.1      RDW 19.7 (*)     Platelets 393      MPV 11.2      Immature Granulocytes 0.6 (*)     Gran # (ANC) 11.3 (*)     Immature Grans (Abs) 0.08 (*)     Lymph # 0.6 (*)     Mono # 1.5 (*)     Eos # 0.3      Baso # 0.12      nRBC 0      Gran % 81.3 (*)     Lymph % 4.4 (*)     Mono % 11.0      Eosinophil % 1.8      Basophil % 0.9      Differential Method Automated     COMPREHENSIVE  METABOLIC PANEL - Abnormal    Sodium 133 (*)     Potassium 3.6      Chloride 101      CO2 22 (*)     Glucose 142 (*)     BUN 15      Creatinine 2.1 (*)     Calcium 8.4 (*)     Total Protein 8.3      Albumin 2.0 (*)     Total Bilirubin 0.4      Alkaline Phosphatase 372 (*)     AST 29      ALT 17      eGFR 26.8 (*)     Anion Gap 10       EKG Readings: (Independently Interpreted)   Initial Reading: No STEMI. Previous EKG: Compared with most recent EKG Rhythm: Normal Sinus Rhythm. Heart Rate: 82. Conduction: Normal. Clinical Impression: Normal Sinus Rhythm       Imaging Results              CT Thigh Without Contrast Left (In process)                      X-Ray Femur 2 View Left (In process)    Procedure changed from X-Ray Knee 3 View Left                    Medications   morphine injection 4 mg (4 mg Intravenous Given 11/13/24 2139)     Medical Decision Making  59-year-old female with multiple comorbidities presenting for pain to her left lower extremity.  Initially, patient is afebrile, mildly hypertensive and overall hemodynamically stable.      Based on physical exam, lower suspicion for emergent pathology such as necrotizing fasciitis.  Due to patient's persistent pain despite recent treatment with antibiotics and discharge with p.o. antibiotics, obtain CT of the left leg to assess for worsening infection.  Suspect that patient likely has cellulitis with possible abscess.  Will obtain basic labs in case patient requires admission.  Will give morphine for pain.    Patient has improving leukocytosis, baseline anemia.  No significant electrolyte disturbances.  No EKGs changes.    Discussed patient's case with oncoming team, patient pending CT of the thigh/left femur.  If CT of the thigh is not concerning for worsening infection, patient may be discharged to [facility] for wound care and skilled nursing.  If CT is suggestive of worsening infection, would admit for IV antibiotics/infectious disease  consultation    Amount and/or Complexity of Data Reviewed  Labs: ordered. Decision-making details documented in ED Course.  Radiology: ordered and independent interpretation performed. Decision-making details documented in ED Course.    Risk  Prescription drug management.               ED Course as of 11/13/24 2231 Wed Nov 13, 2024 2141 WBC(!): 13.88  Improving leukocytosis. [ES]   2141 Hemoglobin(!): 7.7  Macrocytic anemia consistent with previous. [ES]   2221 CT Thigh Without Contrast Left  Independent interpretation: Consistent with cellulitis.  Possible pocket of fluid.  Did not appreciate any subcutaneous air. [ES]      ED Course User Index  [ES] Fely Bonilla MD                           Clinical Impression:  Final diagnoses:  [R52] Pain  [N18.6] ESRD (end stage renal disease)  [S81.802D] Wound of left lower extremity, subsequent encounter (Primary)                 Fely Bonilla MD  Resident  11/13/24 2231

## 2024-11-14 NOTE — ASSESSMENT & PLAN NOTE
"59 yoF with chronic left AKA stump pain, calciphylaxis, and wound breakdown recently admitted at [place]. Vitals stable. Lab work improved from prior admission. CT scan of the left thigh also improved. No concern for "gas-forming bacteria" on exam as the superficial air noted on CT are areas of open wound.    - No surgical intervention indicated  - If patient is admitted, recommend wound care consult for multiple chronic wounds  - Please call with any questions or change in status  "

## 2024-11-14 NOTE — PLAN OF CARE
Discharge Planning Assessment:    Patient admitted on: 11-14-24  Chart reviewed today  Care plan discussed with ER  treatment team,   attending Dr Coon    Current Dispo: return to [facility] [id number]    Transportation: amb or med-van   between 1130 and 12:30 p  Case management to follow

## 2024-11-14 NOTE — DISCHARGE INSTRUCTIONS
Diagnosis:  Left knee pain/cellulitis     Please continue taking antibiotics that were prescribed at discharge of your last admission.      Please take Tylenol as needed for pain or your previously prescribed Percocet for severe pain. Please do not combine these two medications as they both contain Tylenol.    Tests today showed:   Labs Reviewed   CBC W/ AUTO DIFFERENTIAL - Abnormal       Result Value    WBC 13.88 (*)     RBC 2.41 (*)     Hemoglobin 7.7 (*)     Hematocrit 24.0 (*)      (*)     MCH 32.0 (*)     MCHC 32.1      RDW 19.7 (*)     Platelets 393      MPV 11.2      Immature Granulocytes 0.6 (*)     Gran # (ANC) 11.3 (*)     Immature Grans (Abs) 0.08 (*)     Lymph # 0.6 (*)     Mono # 1.5 (*)     Eos # 0.3      Baso # 0.12      nRBC 0      Gran % 81.3 (*)     Lymph % 4.4 (*)     Mono % 11.0      Eosinophil % 1.8      Basophil % 0.9      Differential Method Automated     COMPREHENSIVE METABOLIC PANEL - Abnormal    Sodium 133 (*)     Potassium 3.6      Chloride 101      CO2 22 (*)     Glucose 142 (*)     BUN 15      Creatinine 2.1 (*)     Calcium 8.4 (*)     Total Protein 8.3      Albumin 2.0 (*)     Total Bilirubin 0.4      Alkaline Phosphatase 372 (*)     AST 29      ALT 17      eGFR 26.8 (*)     Anion Gap 10       CT Thigh Without Contrast Left    (Results Pending)   X-Ray Femur 2 View Left    (Results Pending)       Treatments you had today:   Medications   morphine injection 4 mg (4 mg Intravenous Given 11/13/24 2139)       Follow-Up Plan:  - Follow-up with primary care doctor within 3 - 5 days  - Additional testing and/or evaluation as directed by your primary doctor    Return to the Emergency Department for symptoms including but not limited to: worsening symptoms, shortness of breath or chest pain, vomiting with inability to hold down fluids, fevers greater than 100.4°F, passing out/fainting/unconsciousness, or other concerning symptoms.

## 2024-11-14 NOTE — ED NOTES
Assumed care for pt at this time. Pt resting in bed in NAD, RR e/u. Vital signs stable and within desired limits at this time of assessment. Pt offered bathroom assistance and denies need at this time. Explanation of care/wait provided. Pt verbalizes no needs at this time. Bed in low, locked position with rails up and call bell in reach.

## 2024-11-14 NOTE — SUBJECTIVE & OBJECTIVE
No current facility-administered medications on file prior to encounter.     Current Outpatient Medications on File Prior to Encounter   Medication Sig    acetaminophen (TYLENOL) 325 MG tablet Take 2 tablets (650 mg total) by mouth every 8 (eight) hours as needed for Pain.    allopurinoL (ZYLOPRIM) 100 MG tablet Take 0.5 tablets (50 mg total) by mouth every other day.    amoxicillin-clavulanate 500-125mg (AUGMENTIN) 500-125 mg Tab Take 1 tablet (500 mg total) by mouth 2 (two) times daily. for 5 days    apixaban (ELIQUIS) 5 mg Tab Take 1 tablet (5 mg total) by mouth 2 (two) times daily.    atorvastatin (LIPITOR) 80 MG tablet Take 1 tablet (80 mg total) by mouth once daily.    blood sugar diagnostic Strp 1 each by Misc.(Non-Drug; Combo Route) route 3 (three) times daily.    blood sugar diagnostic Strp Use to check blood glucose 2 (two) times a day.    blood-glucose meter Misc TEST TWICE DAILY    blood-glucose sensor (DEXCOM G7 SENSOR) Radha 1 each by Misc.(Non-Drug; Combo Route) route once daily.    calcitRIOL (ROCALTROL) 0.5 MCG Cap Take 1 capsule (0.5 mcg total) by mouth once daily.    carvediloL (COREG) 6.25 MG tablet Take 0.5 tablets (3.125 mg total) by mouth 2 (two) times daily.    cinacalcet (SENSIPAR) 30 MG Tab Take 1 tablet (30 mg total) by mouth every Mon, Wed, Fri.    clopidogreL (PLAVIX) 75 mg tablet Take 1 tablet (75 mg total) by mouth once daily.    doxycycline (VIBRA-TABS) 100 MG tablet Take 1 tablet (100 mg total) by mouth every 12 (twelve) hours. for 5 days    epoetin nina (PROCRIT) 4,000 unit/mL injection Inject 1 mL (4,000 Units total) into the skin every Mon, Wed, Fri.    famotidine (PEPCID) 20 MG tablet Take 1 tablet (20 mg total) by mouth once daily.    hydrALAZINE (APRESOLINE) 10 MG tablet Take 1 tablet (10 mg total) by mouth every 12 (twelve) hours.    insulin aspart U-100 (NOVOLOG) 100 unit/mL (3 mL) InPn pen Inject 0-10 Units into the skin before meals and at bedtime as needed (Hyperglycemia).  "**MODERATE CORRECTION DOSE**  Blood Glucose  mg/dL                  Pre-meal                2200  151-200                2 units                    1 unit  201-250                4 units                    2 units  251-300                6 units                    3 units  301-350                8 units                    4 units  >350                     10 units                  5 units  Administer subcutaneously if needed at times designated by monitoring  schedule.    insulin glargine U-100, Lantus, 100 unit/mL (3 mL) SubQ InPn pen Inject 5 Units into the skin every evening.    isosorbide dinitrate (ISORDIL) 10 MG tablet Take 1 tablet (10 mg total) by mouth 2 (two) times daily.    Lactobacillus rhamnosus GG (CULTURELLE) 10 billion cell capsule Take 1 capsule by mouth once daily.    lancets 33 gauge Misc TEST 3 TIMES DAILY    oxyCODONE-acetaminophen (PERCOCET) 5-325 mg per tablet Take 1 tablet by mouth every 8 (eight) hours as needed for Pain.    pen needle, diabetic 32 gauge x 5/32" Ndle 1 Units by Misc.(Non-Drug; Combo Route) route before meals as needed (SSI).    pen needle, diabetic 33 gauge x 5/32" Ndle 1 each by Misc.(Non-Drug; Combo Route) route 3 (three) times daily.    pregabalin (LYRICA) 75 MG capsule Take 1 capsule (75 mg total) by mouth Daily.    sertraline (ZOLOFT) 100 MG tablet Take 1 tablet (100 mg total) by mouth once daily.    sodium bicarbonate 650 MG tablet Take 1 tablet (650 mg total) by mouth 3 (three) times daily.    vitamin renal formula, B-complex-vitamin c-folic acid, (NEPHROCAP) 1 mg Cap Take 1 capsule by mouth once daily.    [DISCONTINUED] lancing device Misc 1 Device by Misc.(Non-Drug; Combo Route) route 2 (two) times daily with meals.       Review of patient's allergies indicates:   Allergen Reactions    Ciprofloxacin Itching    Iodine      Kidney injury    Pcn [penicillins]      Rash; tolerated ceftriaxone on 1/13/20  Tolerating Augmentin November 2024       Past Medical History: "   Diagnosis Date    Anxiety     Chronic pain syndrome     CKD (chronic kidney disease), stage III     Depression     Diabetes mellitus, type 2     GERD (gastroesophageal reflux disease)     Hyperemesis 03/23/2021    Hypokalemia 03/23/2021    Infection of below knee amputation stump 03/12/2022    Osteomyelitis     Osteomyelitis of left foot 04/30/2021    Ulcer of left foot     Vaginal delivery     x1     Past Surgical History:   Procedure Laterality Date    ABOVE-KNEE AMPUTATION Left 5/18/2021    Procedure: AMPUTATION, ABOVE KNEE;  Surgeon: Teddy Huber MD;  Location: [facility] OR [address] [place] [address];  Service: Vascular;  Laterality: Left;    ABOVE-KNEE AMPUTATION Right 3/18/2022    Procedure: AMPUTATION, ABOVE KNEE;  Surgeon: DAYNE Florez II, MD;  Location: [facility] OR [address] [place] [address];  Service: Vascular;  Laterality: Right;    Angiogram - Right Extremity Right 7/9/15    angiogram-left leg  10/6/15    ANGIOGRAPHY OF LOWER EXTREMITY Left 4/29/2021    Procedure: ANGIOGRAM, LOWER EXTREMITY;  Surgeon: Teddy Huber MD;  Location: [facility] OR [address] [place] [address];  Service: Vascular;  Laterality: Left;    BELOW KNEE AMPUTATION OF LOWER EXTREMITY Right 12/28/2021    Procedure: AMPUTATION, BELOW KNEE;  Surgeon: Kaitlyn Rojas MD;  Location: [facility] OR;  Service: General;  Laterality: Right;    CATHETERIZATION OF BOTH LEFT AND RIGHT HEART N/A 12/18/2019    Procedure: CATHETERIZATION, HEART, BOTH LEFT AND RIGHT;  Surgeon: Que Fernando III, MD;  Location: [facility] CATH LAB;  Service: Cardiology;  Laterality: N/A;    CORONARY ANGIOGRAPHY N/A 12/18/2019    Procedure: ANGIOGRAM, CORONARY ARTERY;  Surgeon: Que Fernando III, MD;  Location: [facility] C[facility] LAB;  Service: Cardiology;  Laterality: N/A;    CORONARY ANGIOGRAPHY INCLUDING BYPASS GRAFTS WITH CATHETERIZATION OF LEFT HEART N/A 7/28/2020    Procedure: ANGIOGRAM, CORONARY, INCLUDING BYPASS GRAFT, WITH LEFT HEART CATHETERIZATION, 9 am;  Surgeon: Rachel Easley MD;  Location: [facility] CATH LAB;   Service: Cardiology;  Laterality: N/A;    CORONARY ARTERY BYPASS GRAFT (CABG) N/A 1/14/2020    Procedure: CORONARY ARTERY BYPASS GRAFT (CABG) x 1     Off Pump;  Surgeon: Huang Altamirano MD;  Location: [facility] OR [address] [place] [address];  Service: Cardiovascular;  Laterality: N/A;    CREATION OF FEMORAL-TIBIAL ARTERY BYPASS Left 4/29/2021    Procedure: CREATION, BYPASS, ARTERIAL, FEMORAL TO ANTERIOR TIBIAL;  Surgeon: Teddy Huber MD;  [facility];  Service: Vascular;  Laterality: Left;    CREATION OF FEMOROPOPLITEAL ARTERIAL BYPASS USING GRAFT Left 8/18/2020    Procedure: CREATION, BYPASS, ARTERIAL, FEMORAL TO POPLITEAL, USING GRAFT, LEFT LOWER EXTREMITY;  Surgeon: Teddy Huber MD;  Location: [facility];  Service: Vascular;  Laterality: Left;  REQUEST 7:15 A.M. START----COVID NEGATIVE ON 8/17 1ST CASE STARTKENYA DUEÑAS ON 8/7/2020 @ 942AM-  RN PREOP 8/12/2020   T/S-----CLEARED BY CARDS-------PENDING INSURANCE    DEBRIDEMENT OF FOOT Left 9/8/2020    Procedure: DEBRIDEMENT, FOOT;  Surgeon: Rosio Mayes DPM;  Location: [facility] OR;  Service: Podiatry;  Laterality: Left;  request neoxx .   RN Pre Op 9-4-2020, Covid negative on 9/5/20. C A    DEBRIDEMENT OF FOOT  3/4/2021    Procedure: DEBRIDEMENT, FOOT;  Surgeon: Teddy Huber MD;  Location: [facility] OR;  Service: Vascular;;    DEBRIDEMENT OF FOOT Left 3/9/2021    Procedure: DEBRIDEMENT, FOOT, bone biopsy;  Surgeon: Rosio Mayes DPM;  Location: [facility] OR;  Service: Podiatry;  Laterality: Left;  Request neoxx---COVID IN AM  REQUESTING NOON START  RN Phone Pre op.On Blood thinners Plavix and Eliquis.  Covid am of surgery. C A    DEBRIDEMENT OF FOOT Left 5/4/2021    Procedure: DEBRIDEMENT, FOOT;  Surgeon: Farooq Morley DPM;  [facility];  Service: Podiatry;  Laterality: Left;    ESOPHAGOGASTRODUODENOSCOPY N/A 11/7/2024    Procedure: EGD (ESOPHAGOGASTRODUODENOSCOPY);  Surgeon: Yony Cancino MD;  Location: [facility];   Service: Endoscopy;  Laterality: N/A;    INSERTION OF TUNNELED CENTRAL VENOUS HEMODIALYSIS CATHETER N/A 1/27/2020    Procedure: Insertion, Catheter, Central Venous, Hemodialysis;  Surgeon: ESTEBAN Gomez III, MD;  Location: [facility] CATH LAB;  Service: Peripheral Vascular;  Laterality: N/A;    INSERTION OF TUNNELED CENTRAL VENOUS HEMODIALYSIS CATHETER  5/10/2023    Procedure: Insertion, Catheter, Central Venous, Hemodialysis;  Surgeon: Romulo Queen MD;  Location: [facility] CATH LAB;  Service: Cardiology;;    PERCUTANEOUS TRANSLUMINAL ANGIOPLASTY N/A 3/4/2021    Procedure: PTA (ANGIOPLASTY, PERCUTANEOUS, TRANSLUMINAL);  Surgeon: Teddy Huber MD;  Location: [facility] OR;  Service: Vascular;  Laterality: N/A;    REMOVAL OF ARTERIOVENOUS GRAFT Left 5/27/2021    Procedure: REMOVAL, GRAFT, LEFT LOWER EXTREMITY, WOUND EXPLORATION;  Surgeon: Teddy Huber MD;  Location: [facility] OR 2ND FLR;  Service: Vascular;  Laterality: Left;    REMOVAL OF NAIL OF DIGIT Left 3/9/2021    Procedure: REMOVAL, NAIL, DIGIT;  Surgeon: Rosio Mayes DPM;  Location: [facility] OR;  Service: Podiatry;  Laterality: Left;    RIGHT HEART CATHETERIZATION Right 5/10/2023    Procedure: INSERTION, CATHETER, RIGHT HEART;  Surgeon: Romulo Queen MD;  Location: [facility] CATH LAB;  Service: Cardiology;  Laterality: Right;    THROMBECTOMY Left 3/4/2021    Procedure: THROMBECTOMY, LEFT LOWER EXTREMITY BYPASS GRAFT, ANGIOGRAM, POSSIBLE INTERVENTION, POSSIBLE LEFT LOWER EXTREMITY BYPASS;  Surgeon: Teddy Huber MD;  Location: [facility] OR;  Service: Vascular;  Laterality: Left;    THROMBECTOMY Left 4/29/2021    Procedure: GRAFT THROMBECTOMY, LEFT LOWER EXTREMITY;  Surgeon: Teddy Huber MD;  Location: [facility] OR 2ND FLR;  Service: Vascular;  Laterality: Left;  14.5 min  1179.85 mGy  341.01 Gycm2  240 ml dye    THROMBECTOMY  10/22/2021    Procedure: THROMBECTOMY;  Surgeon: Saad Arenas MD;  Location: [facility] CATH LAB/EP;  Service: Cardiology;;     Family  History       Problem Relation (Age of Onset)    Diabetes Mother, Father, Paternal Grandmother    Heart disease Maternal Grandmother    No Known Problems Maternal Grandfather, Paternal Grandfather          Tobacco Use    Smoking status: Former    Smokeless tobacco: Never   Substance and Sexual Activity    Alcohol use: No    Drug use: Yes     Types: Marijuana     Comment: occassional    Sexual activity: Yes     Partners: Male     Review of Systems   Unable to perform ROS: Mental status change     Objective:     Vital Signs (Most Recent):  Temp: 96.6 °F (35.9 °C) (11/14/24 0113)  Pulse: 83 (11/14/24 0113)  Resp: 18 (11/14/24 0113)  BP: (!) 151/63 (11/14/24 0113)  SpO2: 95 % (11/14/24 0113) Vital Signs (24h Range):  Temp:  [96.6 °F (35.9 °C)-98.1 °F (36.7 °C)] 96.6 °F (35.9 °C)  Pulse:  [76-83] 83  Resp:  [16-18] 18  SpO2:  [95 %-100 %] 95 %  BP: (137-157)/(63-87) 151/63     Weight: 70.8 kg (156 lb)  Body mass index is 47.6 kg/m².     Physical Exam  Vitals reviewed.   Constitutional:       Appearance: She is ill-appearing.   Cardiovascular:      Rate and Rhythm: Normal rate.      Pulses: Normal pulses.   Pulmonary:      Effort: Pulmonary effort is normal.   Abdominal:      General: There is no distension.      Palpations: Abdomen is soft.   Musculoskeletal:      Comments: Right AKA stump wekll healed. Left AKA stump with chronic wound breakdown and surrounding calciphylaxis, especially of the medial side. No significant drainage.    Skin:     General: Skin is warm and dry.   Neurological:      General: No focal deficit present.      Mental Status: Mental status is at baseline. She is disoriented.            I have reviewed all pertinent lab results within the past 24 hours.  CBC:   Recent Labs   Lab 11/13/24 2120   WBC 13.88*   RBC 2.41*   HGB 7.7*   HCT 24.0*      *   MCH 32.0*   MCHC 32.1     CMP:   Recent Labs   Lab 11/13/24 2120   *   CALCIUM 8.4*   ALBUMIN 2.0*   PROT 8.3   *   K 3.6    CO2 22*      BUN 15   CREATININE 2.1*   ALKPHOS 372*   ALT 17   AST 29   BILITOT 0.4       Significant Diagnostics:  I have reviewed all pertinent imaging results/findings within the past 24 hours.

## 2024-11-14 NOTE — HPI
Ms. Connelly is a 59 yoF with PMH including hypertension, hyperlipidemia, diabetes mellitus type 2, ESRD on HD, and PAD (s/p B/l AKA) who was recently hospitalized at [facility] after being found down by a neighbor. There was concern for left stump infection and CT was obtained on 10/28 demonstrating diffuse subcutaneous fatty stranding of the remaining left thigh with distal medial thigh subcutaneous emphysema. She was managed non operatively with antibiotics as these represented open areas of chronic wound breakdown. She was recently discharged to rehab on 11/12 and represented to our ED this evening from dialysis due to complaints of left leg pain. Of note, this pain has been a long term issue, along with her left AKA stump breakdown and calciphylaxis. Also of note, Ms. Connelly is confused at baseline and a poor historian. Repeat CT of the thigh performed here with significant improvement in the previously seen fat stranding, with stable findings of air where the wound is open.

## 2024-11-14 NOTE — ED NOTES
[facility]  Expected date:   Expected time:   Means of arrival:   Comments:  Flex

## 2024-11-14 NOTE — ED NOTES
Received report from DEANGELO Ku. Assumed care of Suyapa Connelly, a 59 y.o. female at this time.    Triage note:  Chief Complaint   Patient presents with    Knee Pain     Left knee pain started a month ago,  came from dialysis.  PMH ESRD, bilateral amputations, HTN, DM       LOC: The patient is awake, alert and aware of environment with an appropriate affect, oriented x 4 with delayed speech.   APPEARANCE: Patient appears comfortable and in no acute distress, patient is clean and well groomed.  NEURO: Pt opens eyes spontaneously, behavior appropriate to situation, follows commands, facial expression symmetrical, bilateral hand grasp equal and even, purposeful motor response noted, normal sensation in all extremities when touched with a finger, PERRLA noted.  HEENT: Head is normocephalic and sits midline on the shoulders, eyes are symmetrical with no blurry vision or glasses; ears are clean, dry, intact, patent with no cerumen blocking the pathway, no hearing aids present; nose is free from clean, dry, intact, without any drainage, both nostrils are patent.   SKIN: The skin is warm and dry, color consistent with ethnicity, patient has normal skin turgor and moist mucus membranes, left subclavian hemodialysis catheter noted. Cellulitis noted on L thigh, pressure injury to the sacral spine, moisture associated dermatitis to left groin and buttocks.  MUSCULOSKELETAL: Patient moving all extremities spontaneously with AROM in all extremities, no swelling noted. Pt reports left knee pain. Bilateral above knee amputations noted.  RESPIRATORY: Airway is open and patent, respirations are spontaneous, patient has a normal effort and rate, no accessory muscle use noted, breath sounds are clear and equal bilaterally. The pt denies SOB at this time.  CARDIAC: No edema noted, capillary refill < 3 seconds. The pt denies chest pain at this time.  GASTRO: Soft and non tender to palpation, no distention noted, normoactive bowel  sounds present in all four quadrants. The pt denies any nausea or vomiting at this time.  : MWF hemodialysis, pt did not receive hemodialysis today.  VASCULAR: Upper extremity pulses 2+, all extremities warm, no numbness or tingling to any extremities, no edema noted. Bilateral above knee amputations noted.

## 2024-11-14 NOTE — ED NOTES
Bedside report completed. Assumed care of patient at this time. Pt in hospital gown and currently lying in stretcher. Vital signs are stable. Pt denied restroom use. Lighting adjusted, no other complaints from pt at this time. Care ongoing.    LOC: The patient is awake, alert and aware of environment with an appropriate affect, the patient is oriented x 3 and speaking appropriately.   APPEARANCE: Patient appears comfortable and in no acute distress, patient is clean and well groomed.  SKIN: The skin is warm and dry, color consistent with ethnicity, patient has normal skin turgor and moist mucus membranes, skin intact, no breakdown or bruising noted.   MUSCULOSKELETAL: Patient moving all extremities spontaneously, no swelling noted. BLE AKA.  RESPIRATORY: Airway is open and patent, respirations are spontaneous, patient has a normal effort and rate, no accessory muscle.  CARDIAC: Pt placed on cardiac monitor. Patient has a normal rate and regular rhythm, no edema noted, capillary refill < 3 seconds.   GASTRO: Soft and non tender to palpation, no distention noted.  : Pt denies any pain or frequency with urination.  NEURO: Pt opens eyes spontaneously, behavior appropriate to situation, follows commands, facial expression symmetrical, bilateral hand grasp equal and even, purposeful motor response noted, normal sensation in all extremities when touched with a finger.

## 2024-11-14 NOTE — PLAN OF CARE
[facility] - Emergency Dept  Initial Discharge Assessment       Primary Care Provider: Vanessa Noel MD    Admission Diagnosis: No admission diagnoses are documented for this encounter.    Admission Date: 11/13/2024  Expected Discharge Date:     Transition of Care Barriers: (P) None    Payor: [employer] MANAGED MEDICARE / Plan: [employer] MEDICARE SELECT PARTNER / Product Type: Medicare Advantage /     Extended Emergency Contact Information  Primary Emergency Contact: Sal[place]a [place]  Mobile Phone: [phone]  Relation: Mother  Secondary Emergency Contact: Jen Arias  Mobile Phone: [phone]  Relation: Relative    Discharge Plan A: (P) Skilled Nursing Facility  Discharge Plan B: (P) Skilled Nursing Facility      [id number] - [place], [place] - [address] [facility] [address]  [address]  [place] [address]  Phone: [phone] Fax: [phone]    [facility] - [place], [place] - [address] [facility] [address] [id number] [facility] [address]  [facility] [address]  Phone: [phone] Fax: [phone]      Initial Assessment (most recent)       Adult Discharge Assessment - 11/14/24 1059          Discharge Assessment    Assessment Type Discharge Planning Assessment (P)      Confirmed/corrected address, phone number and insurance Yes (P)      Confirmed Demographics Correct on Facesheet (P)      Source of Information patient;health record (P)      People in Home facility resident (P)      Do you expect to return to your current living situation? Yes (P)      Prior to hospitilization cognitive status: Not Oriented to Place (P)      Current cognitive status: Not Oriented to Place (P)      Equipment Currently Used at Home wheelchair (P)      Patient currently being followed by outpatient case management? No (P)      Do you take prescription medications? Yes (P)      Do you have prescription coverage? Yes (P)      Do you have any problems affording any of your prescribed  medications? No (P)      Is the patient taking medications as prescribed? yes (P)      How do you get to doctors appointments? health plan transportation (P)      Discharge Plan A Skilled Nursing Facility (P)      Discharge Plan B Skilled Nursing Facility (P)      DME Needed Upon Discharge  wound care supplies (P)      Discharge Plan discussed with: Patient;Parent(s) (P)      Transition of Care Barriers None (P)         Physical Activity    On average, how many days per week do you engage in moderate to strenuous exercise (like a brisk walk)? 0 days (P)         Financial Resource Strain    How hard is it for you to pay for the very basics like food, housing, medical care, and heating? Not very hard (P)         Housing Stability    In the last 12 months, was there a time when you were not able to pay the mortgage or rent on time? No (P)         Transportation Needs    Has the lack of transportation kept you from medical appointments, meetings, work or from getting things needed for daily living? No (P)         Food Insecurity    Within the past 12 months, you worried that your food would run out before you got the money to buy more. Never true (P)         Stress    Do you feel stress - tense, restless, nervous, or anxious, or unable to sleep at night because your mind is troubled all the time - these days? Not at all (P)         Social Isolation    How often do you feel lonely or isolated from those around you?  Often (P)         Alcohol Use    Q1: How often do you have a drink containing alcohol? Patient declined (P)         Utilities    In the past 12 months has the electric, gas, oil, or water company threatened to shut off services in your home? No (P)         Health Literacy    How often do you need to have someone help you when you read instructions, pamphlets, or other written material from your doctor or pharmacy? Often (P)

## 2024-11-14 NOTE — CONSULTS
[facility] - Emergency Dept  General Surgery  Consult Note    Patient Name: Suyapa Connelly  MRN: [id number]  Code Status: Prior  Admission Date: 11/13/2024  Hospital Length of Stay: 0 days  Attending Physician: Aubrey Em MD  Primary Care Provider: Vanessa Noel MD    Patient information was obtained from past medical records and ER records.     Inpatient consult to General surgery  Consult performed by: Yony Ramirez MD  Consult ordered by: Leticia Asher MD        Subjective:     Principal Problem: <principal problem not specified>    History of Present Illness: Ms. Connelly is a 59 yoF with PMH including hypertension, hyperlipidemia, diabetes mellitus type 2, ESRD on HD, and PAD (s/p B/l AKA) who was recently hospitalized at [place] after being found down by a neighbor. There was concern for left stump infection and CT was obtained on 10/28 demonstrating diffuse subcutaneous fatty stranding of the remaining left thigh with distal medial thigh subcutaneous emphysema. She was managed non operatively with antibiotics as these represented open areas of chronic wound breakdown. She was recently discharged to rehab on 11/12 and represented to our ED this evening from dialysis due to complaints of left leg pain. Of note, this pain has been a long term issue, along with her left AKA stump breakdown and calciphylaxis. Also of note, Ms. Connelly is confused at baseline and a poor historian. Repeat CT of the thigh performed here with significant improvement in the previously seen fat stranding, with stable findings of air where the wound is open.    No current facility-administered medications on file prior to encounter.     Current Outpatient Medications on File Prior to Encounter   Medication Sig    acetaminophen (TYLENOL) 325 MG tablet Take 2 tablets (650 mg total) by mouth every 8 (eight) hours as needed for Pain.    allopurinoL (ZYLOPRIM) 100 MG tablet Take 0.5 tablets (50 mg total) by mouth  every other day.    amoxicillin-clavulanate 500-125mg (AUGMENTIN) 500-125 mg Tab Take 1 tablet (500 mg total) by mouth 2 (two) times daily. for 5 days    apixaban (ELIQUIS) 5 mg Tab Take 1 tablet (5 mg total) by mouth 2 (two) times daily.    atorvastatin (LIPITOR) 80 MG tablet Take 1 tablet (80 mg total) by mouth once daily.    blood sugar diagnostic Strp 1 each by Misc.(Non-Drug; Combo Route) route 3 (three) times daily.    blood sugar diagnostic Strp Use to check blood glucose 2 (two) times a day.    blood-glucose meter Misc TEST TWICE DAILY    blood-glucose sensor (DEXCOM G7 SENSOR) Radha 1 each by Misc.(Non-Drug; Combo Route) route once daily.    calcitRIOL (ROCALTROL) 0.5 MCG Cap Take 1 capsule (0.5 mcg total) by mouth once daily.    carvediloL (COREG) 6.25 MG tablet Take 0.5 tablets (3.125 mg total) by mouth 2 (two) times daily.    cinacalcet (SENSIPAR) 30 MG Tab Take 1 tablet (30 mg total) by mouth every Mon, Wed, Fri.    clopidogreL (PLAVIX) 75 mg tablet Take 1 tablet (75 mg total) by mouth once daily.    doxycycline (VIBRA-TABS) 100 MG tablet Take 1 tablet (100 mg total) by mouth every 12 (twelve) hours. for 5 days    epoetin nina (PROCRIT) 4,000 unit/mL injection Inject 1 mL (4,000 Units total) into the skin every Mon, Wed, Fri.    famotidine (PEPCID) 20 MG tablet Take 1 tablet (20 mg total) by mouth once daily.    hydrALAZINE (APRESOLINE) 10 MG tablet Take 1 tablet (10 mg total) by mouth every 12 (twelve) hours.    insulin aspart U-100 (NOVOLOG) 100 unit/mL (3 mL) InPn pen Inject 0-10 Units into the skin before meals and at bedtime as needed (Hyperglycemia). **MODERATE CORRECTION DOSE**  Blood Glucose  mg/dL                  Pre-meal                2200  151-200                2 units                    1 unit  201-250                4 units                    2 units  251-300                6 units                    3 units  301-350                8 units                    4 units  >350                   "   10 units                  5 units  Administer subcutaneously if needed at times designated by monitoring  schedule.    insulin glargine U-100, Lantus, 100 unit/mL (3 mL) SubQ InPn pen Inject 5 Units into the skin every evening.    isosorbide dinitrate (ISORDIL) 10 MG tablet Take 1 tablet (10 mg total) by mouth 2 (two) times daily.    Lactobacillus rhamnosus GG (CULTURELLE) 10 billion cell capsule Take 1 capsule by mouth once daily.    lancets 33 gauge Misc TEST 3 TIMES DAILY    oxyCODONE-acetaminophen (PERCOCET) 5-325 mg per tablet Take 1 tablet by mouth every 8 (eight) hours as needed for Pain.    pen needle, diabetic 32 gauge x 5/32" Ndle 1 Units by Misc.(Non-Drug; Combo Route) route before meals as needed (SSI).    pen needle, diabetic 33 gauge x 5/32" Ndle 1 each by Misc.(Non-Drug; Combo Route) route 3 (three) times daily.    pregabalin (LYRICA) 75 MG capsule Take 1 capsule (75 mg total) by mouth Daily.    sertraline (ZOLOFT) 100 MG tablet Take 1 tablet (100 mg total) by mouth once daily.    sodium bicarbonate 650 MG tablet Take 1 tablet (650 mg total) by mouth 3 (three) times daily.    vitamin renal formula, B-complex-vitamin c-folic acid, (NEPHROCAP) 1 mg Cap Take 1 capsule by mouth once daily.    [DISCONTINUED] lancing device Misc 1 Device by Misc.(Non-Drug; Combo Route) route 2 (two) times daily with meals.       Review of patient's allergies indicates:   Allergen Reactions    Ciprofloxacin Itching    Iodine      Kidney injury    Pcn [penicillins]      Rash; tolerated ceftriaxone on 1/13/20  Tolerating Augmentin November 2024       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    Osteomyelitis     Osteomyelitis of left foot 04/30/2021    " Ulcer of left foot     Vaginal delivery     x1     Past Surgical History:   Procedure Laterality Date    ABOVE-KNEE AMPUTATION Left 5/18/2021    Procedure: AMPUTATION, ABOVE KNEE;  Surgeon: Teddy Huebr MD;  Location: [address];  Service: Vascular;  Laterality: Left;    ABOVE-KNEE AMPUTATION Right 3/18/2022    Procedure: AMPUTATION, ABOVE KNEE;  Surgeon: DAYNE Florez II, MD;  Location: [facility] OR [address] [place] [address];  Service: Vascular;  Laterality: Right;    Angiogram - Right Extremity Right 7/9/15    angiogram-left leg  10/6/15    ANGIOGRAPHY OF LOWER EXTREMITY Left 4/29/2021    Procedure: ANGIOGRAM, LOWER EXTREMITY;  Surgeon: Teddy Huber MD;  Location: [address];  Service: Vascular;  Laterality: Left;    BELOW KNEE AMPUTATION OF LOWER EXTREMITY Right 12/28/2021    Procedure: AMPUTATION, BELOW KNEE;  Surgeon: Kaitlyn Rojas MD;  Location: [facility];  Service: General;  Laterality: Right;    CATHETERIZATION OF BOTH LEFT AND RIGHT HEART N/A 12/18/2019    Procedure: CATHETERIZATION, HEART, BOTH LEFT AND RIGHT;  Surgeon: Que Fernando III, MD;  Location: [facility] CATH LAB;  Service: Cardiology;  Laterality: N/A;    CORONARY ANGIOGRAPHY N/A 12/18/2019    Procedure: ANGIOGRAM, CORONARY ARTERY;  Surgeon: Que Fernando III, MD;  Location: [facility] CATH LAB;  Service: Cardiology;  Laterality: N/A;    CORONARY ANGIOGRAPHY INCLUDING BYPASS GRAFTS WITH CATHETERIZATION OF LEFT HEART N/A 7/28/2020    Procedure: ANGIOGRAM, CORONARY, INCLUDING BYPASS GRAFT, WITH LEFT HEART CATHETERIZATION, 9 am;  Surgeon: Rachel Easley MD;  Location: [facility] CATH LAB;  Service: Cardiology;  Laterality: N/A;    CORONARY ARTERY BYPASS GRAFT (CABG) N/A 1/14/2020    Procedure: CORONARY ARTERY BYPASS GRAFT (CABG) x 1     Off Pump;  Surgeon: Huang Altamirano MD;  Location: [address];  Service: Cardiovascular;  Laterality: N/A;    CREATION OF FEMORAL-TIBIAL ARTERY BYPASS Left 4/29/2021    Procedure: CREATION, BYPASS,  ARTERIAL, FEMORAL TO ANTERIOR TIBIAL;  Surgeon: Teddy Huber MD;  [address];  Service: Vascular;  Laterality: Left;    CREATION OF FEMOROPOPLITEAL ARTERIAL BYPASS USING GRAFT Left 8/18/2020    Procedure: CREATION, BYPASS, ARTERIAL, FEMORAL TO POPLITEAL, USING GRAFT, LEFT LOWER EXTREMITY;  Surgeon: Teddy Huber MD;  Location: [facility];  Service: Vascular;  Laterality: Left;  REQUEST 7:15 A.M. START----COVID NEGATIVE ON 8/17  1ST CASE STARTE PER LEANA ON 8/7/2020 @ 942AM-LO  RN PREOP 8/12/2020   T/S-----CLEARED BY CARDS-------PENDING INSURANCE    DEBRIDEMENT OF FOOT Left 9/8/2020    Procedure: DEBRIDEMENT, FOOT;  Surgeon: Rosio Mayes DPM;  Location: [facility];  Service: Podiatry;  Laterality: Left;  request neoxx .   RN Pre Op 9-4-2020, Covid negative on 9/5/20. C A    DEBRIDEMENT OF FOOT  3/4/2021    Procedure: DEBRIDEMENT, FOOT;  Surgeon: Teddy Huber MD;  Location: [facility];  Service: Vascular;;    DEBRIDEMENT OF FOOT Left 3/9/2021    Procedure: DEBRIDEMENT, FOOT, bone biopsy;  Surgeon: Rosio Mayes DPM;  Location: [facility] OR;  Service: Podiatry;  Laterality: Left;  Request neoxx---COVID IN AM  REQUESTING NOON START  RN Phone Pre op.On Blood thinners Plavix and Eliquis.  Covid am of surgery. C A    DEBRIDEMENT OF FOOT Left 5/4/2021    Procedure: DEBRIDEMENT, FOOT;  Surgeon: Farooq Morley DPM;  [facility];  Service: Podiatry;  Laterality: Left;    ESOPHAGOGASTRODUODENOSCOPY N/A 11/7/2024    Procedure: EGD (ESOPHAGOGASTRODUODENOSCOPY);  Surgeon: Yony Cancino MD;  Location: [facility] ENDO;  Service: Endoscopy;  Laterality: N/A;    INSERTION OF TUNNELED CENTRAL VENOUS HEMODIALYSIS CATHETER N/A 1/27/2020    Procedure: Insertion, Catheter, Central Venous, Hemodialysis;  Surgeon: ESTEBAN Gomez III, MD;  Location: [facility] CATH LAB;  Service: Peripheral Vascular;  Laterality: N/A;    INSERTION OF TUNNELED CENTRAL VENOUS HEMODIALYSIS CATHETER  5/10/2023     Procedure: Insertion, Catheter, Central Venous, Hemodialysis;  Surgeon: Romulo Queen MD;  Location: [facility] CATH LAB;  Service: Cardiology;;    PERCUTANEOUS TRANSLUMINAL ANGIOPLASTY N/A 3/4/2021    Procedure: PTA (ANGIOPLASTY, PERCUTANEOUS, TRANSLUMINAL);  Surgeon: Teddy Huber MD;  Location: [facility] OR;  Service: Vascular;  Laterality: N/A;    REMOVAL OF ARTERIOVENOUS GRAFT Left 5/27/2021    Procedure: REMOVAL, GRAFT, LEFT LOWER EXTREMITY, WOUND EXPLORATION;  Surgeon: Teddy Huber MD;  Location: [facility] OR 2ND FLR;  Service: Vascular;  Laterality: Left;    REMOVAL OF NAIL OF DIGIT Left 3/9/2021    Procedure: REMOVAL, NAIL, DIGIT;  Surgeon: Rosio Mayes DPM;  Location: [facility] OR;  Service: Podiatry;  Laterality: Left;    RIGHT HEART CATHETERIZATION Right 5/10/2023    Procedure: INSERTION, CATHETER, RIGHT HEART;  Surgeon: Romulo Queen MD;  Location: [facility] CATH LAB;  Service: Cardiology;  Laterality: Right;    THROMBECTOMY Left 3/4/2021    Procedure: THROMBECTOMY, LEFT LOWER EXTREMITY BYPASS GRAFT, ANGIOGRAM, POSSIBLE INTERVENTION, POSSIBLE LEFT LOWER EXTREMITY BYPASS;  Surgeon: Teddy Huber MD;  Location: [facility] OR;  Service: Vascular;  Laterality: Left;    THROMBECTOMY Left 4/29/2021    Procedure: GRAFT THROMBECTOMY, LEFT LOWER EXTREMITY;  Surgeon: Teddy Huber MD;  Location: [facility] OR 2ND FLR;  Service: Vascular;  Laterality: Left;  14.5 min  1179.85 mGy  341.01 Gycm2  240 ml dye    THROMBECTOMY  10/22/2021    Procedure: THROMBECTOMY;  Surgeon: Saad Arenas MD;  Location: [facility] CATH LAB/EP;  Service: Cardiology;;     Family History       Problem Relation (Age of Onset)    Diabetes Mother, Father, Paternal Grandmother    Heart disease Maternal Grandmother    No Known Problems Maternal Grandfather, Paternal Grandfather          Tobacco Use    Smoking status: Former    Smokeless tobacco: Never   Substance and Sexual Activity    Alcohol use: No    Drug use: Yes     Types: Marijuana      Comment: occassional    Sexual activity: Yes     Partners: Male     Review of Systems   Unable to perform ROS: Mental status change     Objective:     Vital Signs (Most Recent):  Temp: 96.6 °F (35.9 °C) (11/14/24 0113)  Pulse: 83 (11/14/24 0113)  Resp: 18 (11/14/24 0113)  BP: (!) 151/63 (11/14/24 0113)  SpO2: 95 % (11/14/24 0113) Vital Signs (24h Range):  Temp:  [96.6 °F (35.9 °C)-98.1 °F (36.7 °C)] 96.6 °F (35.9 °C)  Pulse:  [76-83] 83  Resp:  [16-18] 18  SpO2:  [95 %-100 %] 95 %  BP: (137-157)/(63-87) 151/63     Weight: 70.8 kg (156 lb)  Body mass index is 47.6 kg/m².     Physical Exam  Vitals reviewed.   Constitutional:       Appearance: She is ill-appearing.   Cardiovascular:      Rate and Rhythm: Normal rate.      Pulses: Normal pulses.   Pulmonary:      Effort: Pulmonary effort is normal.   Abdominal:      General: There is no distension.      Palpations: Abdomen is soft.   Musculoskeletal:      Comments: Right AKA stump wekll healed. Left AKA stump with chronic wound breakdown and surrounding calciphylaxis, especially of the medial side. No significant drainage.    Skin:     General: Skin is warm and dry.   Neurological:      General: No focal deficit present.      Mental Status: Mental status is at baseline. She is disoriented.            I have reviewed all pertinent lab results within the past 24 hours.  CBC:   Recent Labs   Lab 11/13/24 2120   WBC 13.88*   RBC 2.41*   HGB 7.7*   HCT 24.0*      *   MCH 32.0*   MCHC 32.1     CMP:   Recent Labs   Lab 11/13/24 2120   *   CALCIUM 8.4*   ALBUMIN 2.0*   PROT 8.3   *   K 3.6   CO2 22*      BUN 15   CREATININE 2.1*   ALKPHOS 372*   ALT 17   AST 29   BILITOT 0.4       Significant Diagnostics:  I have reviewed all pertinent imaging results/findings within the past 24 hours.    Assessment/Plan:     Left thigh pain  59 yoF with chronic left AKA stump pain, calciphylaxis, and wound breakdown recently admitted at [facility]. Vitals stable.  "Lab work improved from prior admission. CT scan of the left thigh also improved. No concern for "gas-forming bacteria" on exam as the superficial air noted on CT are areas of open wound.    - No surgical intervention indicated  - If patient is admitted, recommend wound care consult for multiple chronic wounds  - Please call with any questions or change in status      VTE Risk Mitigation (From admission, onward)      None            Thank you for your consult. I will sign off. Please contact us if you have any additional questions.    Yony Ramirez MD  General Surgery  [facility]  "

## 2024-11-14 NOTE — PROVIDER PROGRESS NOTES - EMERGENCY DEPT.
Encounter Date: 11/13/2024    ED Physician Progress Notes        Physician Note:   Received call from Dr. Bonilla regarding this patient and a head CT was ordered given waxing and waning encephalopathy and anticoagulant use.  Of note, it appears neuro exam is at her baseline.  A head CT was obtained it was negative for any acute process.  Will proceed with discharge as planned.

## 2024-11-14 NOTE — PROVIDER PROGRESS NOTES - EMERGENCY DEPT.
Encounter Date: 11/13/2024    ED Physician Progress Notes          Physician Note:   Signout Note  I received signout from the previous providers, Dr. Bonilla and Dr. Em.      Chief complaint: Leg pain     Per sign out and chart review:  Suyapa Connelly is a 59 y.o. female, PMH of ESRD on dialysis, diabetes, hypertension, hyperlipidemia, status post bilateral AKA, presenting to [facility] ED for left knee pain.  Of note, patient had recent admission from 10/28 to 11/12 for septic shock for which she was admitted to ICU.  Patient was found to have cellulitis/calciphylaxis of the left thigh.  Patient was de-escalated to p.o. Augmentin and doxycycline and discharged to [facility] skilled nursing facility.  Per previous note, patient was previously cared for by her  who was arrested the day prior to her recent admission.  Patient is unable to provide reliable history.  Triage note states that patient came from dialysis for left knee pain.  Patient states that she has not received dialysis today.  Patient denies fever, nausea/vomiting, abdominal pain, chest pain, shortness of breath.     During ED stay patient received:  Medications   piperacillin-tazobactam (ZOSYN) 4.5 g in D5W 100 mL IVPB (MB+) (0 g Intravenous Stopped 11/14/24 0102)   morphine injection 4 mg (4 mg Intravenous Given 11/13/24 2139)   vancomycin 1.75 g in 5 % dextrose 500 mL IVPB (0 mg Intravenous Stopped 11/14/24 0303)        Pt signed out to me pending: CT and possible Gen surgery consultation.      Update/ Disposition: Pt found to have leukocytosis and elevated CRP today.   CT showed:  1. Postop change of bilateral above knee amputation.  No destructive osseous process to suggest osteomyelitis in the remaining portions of the left femur or in the visualized proximal portion of the cut right femur (most of the right femur and right thigh is excluded from the images).  Consider further evaluation with MRI if continued clinical  concern.  2. Skin defect along the medial aspect of the left thigh with foci of air underlying the wound.  Cannot completely exclude gas-forming bacterial infection.  3. Additional area of soft tissue contour indentation and focal skin thickening along the anterior thigh, suggestive of possible infectious or inflammatory process versus region of scarring.  4. Additional extensive soft tissue edema and skin thickening of the pelvis, partially visualized right lower extremity, and left lower extremity.  Finding may be related to volume overload.  5. These results require clinical correlation.    Paged gen surg at 11:57p, Paged again at 12:19a. Discussed with OR nurse who let resident in surgery know. Paged again at 1:47a. Discussed with gen surg at 1:50a who agreed to evaluate and provide recs for this pt. He feels looking at the CT compared with the prior that her wound does not have new signs of infection but agreed to evaluate the patient. General surgery does not feel she has surgical needs at this time and feel the wound is similar to previous. Pt instructed to continue Percocet for severe pain and continue PO antibiotics.      Patient, caregiver and/or family understands the plan and verbalized agreement. All questions answered.      Diagnostic Impression:    Leg Pain     Leticia Asher MD  [facility], PGY2

## 2024-11-15 ENCOUNTER — HOSPITAL ENCOUNTER (INPATIENT)
Facility: HOSPITAL | Age: 59
LOS: 19 days | Discharge: HOME HEALTH | DRG: 463 | End: 2024-12-05
Attending: STUDENT IN AN ORGANIZED HEALTH CARE EDUCATION/TRAINING PROGRAM
Payer: MEDICARE

## 2024-11-15 DIAGNOSIS — I50.9 CHF (CONGESTIVE HEART FAILURE): ICD-10-CM

## 2024-11-15 DIAGNOSIS — R07.9 CHEST PAIN: ICD-10-CM

## 2024-11-15 DIAGNOSIS — N18.5 CHRONIC KIDNEY DISEASE-MINERAL BONE DISORDER (CKD-MBD) WITH STAGE 5 CHRONIC KIDNEY DISEASE, ON CHRONIC DIALYSIS: ICD-10-CM

## 2024-11-15 DIAGNOSIS — E83.9 CHRONIC KIDNEY DISEASE-MINERAL BONE DISORDER (CKD-MBD) WITH STAGE 5 CHRONIC KIDNEY DISEASE, ON CHRONIC DIALYSIS: ICD-10-CM

## 2024-11-15 DIAGNOSIS — Z99.2 CHRONIC KIDNEY DISEASE-MINERAL BONE DISORDER (CKD-MBD) WITH STAGE 5 CHRONIC KIDNEY DISEASE, ON CHRONIC DIALYSIS: ICD-10-CM

## 2024-11-15 DIAGNOSIS — M25.569 KNEE PAIN: ICD-10-CM

## 2024-11-15 DIAGNOSIS — L03.116 CELLULITIS OF LEFT THIGH: Primary | ICD-10-CM

## 2024-11-15 DIAGNOSIS — I73.9 PERIPHERAL VASCULAR DISEASE: ICD-10-CM

## 2024-11-15 DIAGNOSIS — M89.9 CHRONIC KIDNEY DISEASE-MINERAL BONE DISORDER (CKD-MBD) WITH STAGE 5 CHRONIC KIDNEY DISEASE, ON CHRONIC DIALYSIS: ICD-10-CM

## 2024-11-15 DIAGNOSIS — N18.6 ESRD (END STAGE RENAL DISEASE): ICD-10-CM

## 2024-11-15 DIAGNOSIS — L03.90 CELLULITIS, UNSPECIFIED CELLULITIS SITE: ICD-10-CM

## 2024-11-15 PROBLEM — G93.40 ACUTE ENCEPHALOPATHY: Status: RESOLVED | Noted: 2018-05-05 | Resolved: 2024-11-15

## 2024-11-15 LAB
ABO + RH BLD: ABNORMAL
ALBUMIN SERPL BCP-MCNC: 2 G/DL (ref 3.5–5.2)
ALLENS TEST: NORMAL
ALP SERPL-CCNC: 362 U/L (ref 40–150)
ALT SERPL W/O P-5'-P-CCNC: 19 U/L (ref 10–44)
ANION GAP SERPL CALC-SCNC: 13 MMOL/L (ref 8–16)
AST SERPL-CCNC: 38 U/L (ref 10–40)
BASOPHILS # BLD AUTO: 0.16 K/UL (ref 0–0.2)
BASOPHILS NFR BLD: 1.6 % (ref 0–1.9)
BILIRUB SERPL-MCNC: 0.4 MG/DL (ref 0.1–1)
BLD GP AB SCN CELLS X3 SERPL QL: ABNORMAL
BLOOD GROUP ANTIBODIES SERPL: NORMAL
BUN SERPL-MCNC: 30 MG/DL (ref 6–20)
CALCIUM SERPL-MCNC: 9 MG/DL (ref 8.7–10.5)
CHLORIDE SERPL-SCNC: 101 MMOL/L (ref 95–110)
CO2 SERPL-SCNC: 18 MMOL/L (ref 23–29)
CREAT SERPL-MCNC: 3.7 MG/DL (ref 0.5–1.4)
DIFFERENTIAL METHOD BLD: ABNORMAL
EOSINOPHIL # BLD AUTO: 0.2 K/UL (ref 0–0.5)
EOSINOPHIL NFR BLD: 2.1 % (ref 0–8)
ERYTHROCYTE [DISTWIDTH] IN BLOOD BY AUTOMATED COUNT: 19.5 % (ref 11.5–14.5)
EST. GFR  (NO RACE VARIABLE): 13.6 ML/MIN/1.73 M^2
GLUCOSE SERPL-MCNC: 108 MG/DL (ref 70–110)
HCT VFR BLD AUTO: 25.2 % (ref 37–48.5)
HGB BLD-MCNC: 7.9 G/DL (ref 12–16)
IMM GRANULOCYTES # BLD AUTO: 0.06 K/UL (ref 0–0.04)
IMM GRANULOCYTES NFR BLD AUTO: 0.6 % (ref 0–0.5)
LDH SERPL L TO P-CCNC: 1.7 MMOL/L (ref 0.5–2.2)
LYMPHOCYTES # BLD AUTO: 0.8 K/UL (ref 1–4.8)
LYMPHOCYTES NFR BLD: 7.5 % (ref 18–48)
MCH RBC QN AUTO: 31.3 PG (ref 27–31)
MCHC RBC AUTO-ENTMCNC: 31.3 G/DL (ref 32–36)
MCV RBC AUTO: 100 FL (ref 82–98)
MONOCYTES # BLD AUTO: 1.1 K/UL (ref 0.3–1)
MONOCYTES NFR BLD: 10.5 % (ref 4–15)
NEUTROPHILS # BLD AUTO: 7.9 K/UL (ref 1.8–7.7)
NEUTROPHILS NFR BLD: 77.7 % (ref 38–73)
NRBC BLD-RTO: 0 /100 WBC
OHS QRS DURATION: 88 MS
OHS QTC CALCULATION: 486 MS
PLATELET # BLD AUTO: 406 K/UL (ref 150–450)
PMV BLD AUTO: 11.7 FL (ref 9.2–12.9)
POCT GLUCOSE: 130 MG/DL (ref 70–110)
POTASSIUM SERPL-SCNC: 4.8 MMOL/L (ref 3.5–5.1)
PROT SERPL-MCNC: 8.6 G/DL (ref 6–8.4)
RBC # BLD AUTO: 2.52 M/UL (ref 4–5.4)
SAMPLE: NORMAL
SITE: NORMAL
SODIUM SERPL-SCNC: 132 MMOL/L (ref 136–145)
SPECIMEN OUTDATE: ABNORMAL
WBC # BLD AUTO: 10.15 K/UL (ref 3.9–12.7)

## 2024-11-15 PROCEDURE — 86850 RBC ANTIBODY SCREEN: CPT | Mod: HCNC

## 2024-11-15 PROCEDURE — 25000003 PHARM REV CODE 250: Mod: HCNC

## 2024-11-15 PROCEDURE — 85025 COMPLETE CBC W/AUTO DIFF WBC: CPT | Mod: HCNC

## 2024-11-15 PROCEDURE — 93010 ELECTROCARDIOGRAM REPORT: CPT | Mod: HCNC,,, | Performed by: INTERNAL MEDICINE

## 2024-11-15 PROCEDURE — 80202 ASSAY OF VANCOMYCIN: CPT | Mod: HCNC | Performed by: STUDENT IN AN ORGANIZED HEALTH CARE EDUCATION/TRAINING PROGRAM

## 2024-11-15 PROCEDURE — 87040 BLOOD CULTURE FOR BACTERIA: CPT | Mod: 59,HCNC

## 2024-11-15 PROCEDURE — 83605 ASSAY OF LACTIC ACID: CPT | Mod: HCNC

## 2024-11-15 PROCEDURE — G0378 HOSPITAL OBSERVATION PER HR: HCPCS | Mod: HCNC

## 2024-11-15 PROCEDURE — 80053 COMPREHEN METABOLIC PANEL: CPT | Mod: HCNC | Performed by: STUDENT IN AN ORGANIZED HEALTH CARE EDUCATION/TRAINING PROGRAM

## 2024-11-15 PROCEDURE — 86870 RBC ANTIBODY IDENTIFICATION: CPT | Mod: HCNC

## 2024-11-15 PROCEDURE — 99285 EMERGENCY DEPT VISIT HI MDM: CPT | Mod: 25,HCNC

## 2024-11-15 PROCEDURE — 93005 ELECTROCARDIOGRAM TRACING: CPT | Mod: HCNC

## 2024-11-15 PROCEDURE — 80047 BASIC METABLC PNL IONIZED CA: CPT | Mod: HCNC

## 2024-11-15 PROCEDURE — 99900035 HC TECH TIME PER 15 MIN (STAT): Mod: HCNC

## 2024-11-15 RX ORDER — CEFEPIME HYDROCHLORIDE 1 G/1
1 INJECTION, POWDER, FOR SOLUTION INTRAMUSCULAR; INTRAVENOUS
Status: ACTIVE | OUTPATIENT
Start: 2024-11-15 | End: 2024-11-16

## 2024-11-15 RX ORDER — SODIUM CHLORIDE 0.9 % (FLUSH) 0.9 %
10 SYRINGE (ML) INJECTION EVERY 12 HOURS PRN
Status: DISCONTINUED | OUTPATIENT
Start: 2024-11-15 | End: 2024-12-05 | Stop reason: HOSPADM

## 2024-11-15 RX ORDER — IBUPROFEN 200 MG
24 TABLET ORAL
Status: DISCONTINUED | OUTPATIENT
Start: 2024-11-15 | End: 2024-11-15

## 2024-11-15 RX ORDER — MORPHINE SULFATE 4 MG/ML
4 INJECTION, SOLUTION INTRAMUSCULAR; INTRAVENOUS
Status: ACTIVE | OUTPATIENT
Start: 2024-11-15 | End: 2024-11-16

## 2024-11-15 RX ORDER — SERTRALINE HYDROCHLORIDE 50 MG/1
100 TABLET, FILM COATED ORAL DAILY
Status: DISCONTINUED | OUTPATIENT
Start: 2024-11-16 | End: 2024-12-05 | Stop reason: HOSPADM

## 2024-11-15 RX ORDER — CINACALCET 30 MG/1
30 TABLET, FILM COATED ORAL
Status: DISCONTINUED | OUTPATIENT
Start: 2024-11-18 | End: 2024-12-05 | Stop reason: HOSPADM

## 2024-11-15 RX ORDER — IBUPROFEN 200 MG
24 TABLET ORAL
Status: DISCONTINUED | OUTPATIENT
Start: 2024-11-15 | End: 2024-12-05 | Stop reason: HOSPADM

## 2024-11-15 RX ORDER — ACETAMINOPHEN 500 MG
1000 TABLET ORAL EVERY 8 HOURS PRN
Status: DISCONTINUED | OUTPATIENT
Start: 2024-11-15 | End: 2024-12-05 | Stop reason: HOSPADM

## 2024-11-15 RX ORDER — OXYCODONE AND ACETAMINOPHEN 5; 325 MG/1; MG/1
1 TABLET ORAL EVERY 8 HOURS PRN
Status: DISCONTINUED | OUTPATIENT
Start: 2024-11-15 | End: 2024-11-20

## 2024-11-15 RX ORDER — ATORVASTATIN CALCIUM 40 MG/1
80 TABLET, FILM COATED ORAL DAILY
Status: DISCONTINUED | OUTPATIENT
Start: 2024-11-16 | End: 2024-12-05 | Stop reason: HOSPADM

## 2024-11-15 RX ORDER — NALOXONE HCL 0.4 MG/ML
0.02 VIAL (ML) INJECTION
Status: DISCONTINUED | OUTPATIENT
Start: 2024-11-15 | End: 2024-12-05 | Stop reason: HOSPADM

## 2024-11-15 RX ORDER — CALCITRIOL 0.5 UG/1
0.5 CAPSULE ORAL DAILY
Status: DISCONTINUED | OUTPATIENT
Start: 2024-11-16 | End: 2024-12-05 | Stop reason: HOSPADM

## 2024-11-15 RX ORDER — ISOSORBIDE DINITRATE 10 MG/1
10 TABLET ORAL 2 TIMES DAILY
Status: DISCONTINUED | OUTPATIENT
Start: 2024-11-15 | End: 2024-11-30

## 2024-11-15 RX ORDER — HYDRALAZINE HYDROCHLORIDE 10 MG/1
10 TABLET, FILM COATED ORAL EVERY 12 HOURS
Status: DISCONTINUED | OUTPATIENT
Start: 2024-11-15 | End: 2024-11-27

## 2024-11-15 RX ORDER — INSULIN ASPART 100 [IU]/ML
0-5 INJECTION, SOLUTION INTRAVENOUS; SUBCUTANEOUS
Status: DISCONTINUED | OUTPATIENT
Start: 2024-11-15 | End: 2024-11-15

## 2024-11-15 RX ORDER — GLUCAGON 1 MG
1 KIT INJECTION
Status: DISCONTINUED | OUTPATIENT
Start: 2024-11-15 | End: 2024-11-15

## 2024-11-15 RX ORDER — GLUCAGON 1 MG
1 KIT INJECTION
Status: DISCONTINUED | OUTPATIENT
Start: 2024-11-15 | End: 2024-12-05 | Stop reason: HOSPADM

## 2024-11-15 RX ORDER — IBUPROFEN 200 MG
16 TABLET ORAL
Status: DISCONTINUED | OUTPATIENT
Start: 2024-11-15 | End: 2024-11-15

## 2024-11-15 RX ORDER — SODIUM BICARBONATE 650 MG/1
650 TABLET ORAL 3 TIMES DAILY
Status: DISCONTINUED | OUTPATIENT
Start: 2024-11-15 | End: 2024-11-19

## 2024-11-15 RX ORDER — INSULIN ASPART 100 [IU]/ML
0-5 INJECTION, SOLUTION INTRAVENOUS; SUBCUTANEOUS
Status: DISCONTINUED | OUTPATIENT
Start: 2024-11-15 | End: 2024-12-05 | Stop reason: HOSPADM

## 2024-11-15 RX ORDER — CARVEDILOL 3.12 MG/1
3.12 TABLET ORAL 2 TIMES DAILY
Status: DISCONTINUED | OUTPATIENT
Start: 2024-11-15 | End: 2024-11-26

## 2024-11-15 RX ORDER — IBUPROFEN 200 MG
16 TABLET ORAL
Status: DISCONTINUED | OUTPATIENT
Start: 2024-11-15 | End: 2024-12-05 | Stop reason: HOSPADM

## 2024-11-15 RX ORDER — PREGABALIN 75 MG/1
75 CAPSULE ORAL DAILY
Status: DISCONTINUED | OUTPATIENT
Start: 2024-11-16 | End: 2024-12-05 | Stop reason: HOSPADM

## 2024-11-15 RX ORDER — CLOPIDOGREL BISULFATE 75 MG/1
75 TABLET ORAL DAILY
Status: DISCONTINUED | OUTPATIENT
Start: 2024-11-16 | End: 2024-12-05 | Stop reason: HOSPADM

## 2024-11-15 RX ADMIN — ISOSORBIDE DINITRATE 10 MG: 10 TABLET ORAL at 09:11

## 2024-11-15 RX ADMIN — APIXABAN 5 MG: 5 TABLET, FILM COATED ORAL at 09:11

## 2024-11-15 RX ADMIN — CARVEDILOL 3.12 MG: 3.12 TABLET, FILM COATED ORAL at 09:11

## 2024-11-15 RX ADMIN — OXYCODONE HYDROCHLORIDE AND ACETAMINOPHEN 1 TABLET: 5; 325 TABLET ORAL at 09:11

## 2024-11-15 RX ADMIN — SODIUM BICARBONATE 650 MG TABLET 650 MG: at 09:11

## 2024-11-15 NOTE — NURSING
Placed telemetry and called war room to add on monitor. Patient has multiple wounds to her sacrum, groin, thigh. Will document photos in media. Wound care order place. Patient turned, cleaned, and barrier cream to skin.

## 2024-11-15 NOTE — ED NOTES
.I-STAT Chem-8+ Results:   Value Reference Range   Sodium 133 136-145 mmol/L   Potassium  4.6 3.5-5.1 mmol/L   Chloride 101  mmol/L   Ionized Calcium 1.03 1.06-1.42 mmol/L   CO2 (measured) 22 23-29 mmol/L   Glucose 116  mg/dL   BUN 34 6-30 mg/dL   Creatinine 4.3 0.5-1.4 mg/dL   Hematocrit 17 36-54%

## 2024-11-15 NOTE — ED PROVIDER NOTES
Encounter Date: 11/15/2024       History     Chief Complaint   Patient presents with    Anemia     Hx of anemia and CKD. Hgb 6.8     Pt is a 59 y.o. female with PMH of ESRD on dialysis, diabetes, hypertension, hyperlipidemia, status post bilateral AKA who presents for evaluation of persistent left knee pain. Pt with recent admission ICU admission for septic shock in setting of cellulitis of the left thigh. Discharged to nursing facility with Augmentin. Pt with continued pain and purulence at wound site despite compliance with augmetin and doxycycline.     The history is provided by the patient.     Review of patient's allergies indicates:   Allergen Reactions    Ciprofloxacin Itching    Iodine      Kidney injury    Pcn [penicillins]      Rash; tolerated ceftriaxone on 1/13/20  Tolerating Augmentin November 2024     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    Osteomyelitis     Osteomyelitis of left foot 04/30/2021    Ulcer of left foot     Vaginal delivery     x1     Past Surgical History:   Procedure Laterality Date    ABOVE-KNEE AMPUTATION Left 5/18/2021    Procedure: AMPUTATION, ABOVE KNEE;  Surgeon: Teddy Huber MD;  Location: [facility] OR [address] [place] [address];  Service: Vascular;  Laterality: Left;    ABOVE-KNEE AMPUTATION Right 3/18/2022    Procedure: AMPUTATION, ABOVE KNEE;  Surgeon: DAYNE Florez II, MD;  Location: [facility] OR [address] [place] [address];  Service: Vascular;  Laterality: Right;    Angiogram - Right Extremity Right 7/9/15    angiogram-left leg  10/6/15    ANGIOGRAPHY OF LOWER EXTREMITY Left 4/29/2021    Procedure: ANGIOGRAM, LOWER EXTREMITY;  Surgeon: Teddy Huber MD;  Location: [facility] OR [address] [place] [address];  Service: Vascular;  Laterality: Left;    BELOW  KNEE AMPUTATION OF LOWER EXTREMITY Right 12/28/2021    Procedure: AMPUTATION, BELOW KNEE;  Surgeon: Kaitlyn Rojas MD;  Location: [facility];  Service: General;  Laterality: Right;    CATHETERIZATION OF BOTH LEFT AND RIGHT HEART N/A 12/18/2019    Procedure: CATHETERIZATION, HEART, BOTH LEFT AND RIGHT;  Surgeon: Que Fernando III, MD;  Location: [facility] CATH LAB;  Service: Cardiology;  Laterality: N/A;    CORONARY ANGIOGRAPHY N/A 12/18/2019    Procedure: ANGIOGRAM, CORONARY ARTERY;  Surgeon: Que Fernando III, MD;  Location: [facility] CATH LAB;  Service: Cardiology;  Laterality: N/A;    CORONARY ANGIOGRAPHY INCLUDING BYPASS GRAFTS WITH CATHETERIZATION OF LEFT HEART N/A 7/28/2020    Procedure: ANGIOGRAM, CORONARY, INCLUDING BYPASS GRAFT, WITH LEFT HEART CATHETERIZATION, 9 am;  Surgeon: Rachel Easley MD;  Location: [facility] CATH LAB;  Service: Cardiology;  Laterality: N/A;    CORONARY ARTERY BYPASS GRAFT (CABG) N/A 1/14/2020    Procedure: CORONARY ARTERY BYPASS GRAFT (CABG) x 1     Off Pump;  Surgeon: Huang Altamirano MD;  Location: [facility] OR [address] [place] [address];  Service: Cardiovascular;  Laterality: N/A;    CREATION OF FEMORAL-TIBIAL ARTERY BYPASS Left 4/29/2021    Procedure: CREATION, BYPASS, ARTERIAL, FEMORAL TO ANTERIOR TIBIAL;  Surgeon: Teddy Huber MD;  Location: [facility] OR [address] [place] [address];  Service: Vascular;  Laterality: Left;    CREATION OF FEMOROPOPLITEAL ARTERIAL BYPASS USING GRAFT Left 8/18/2020    Procedure: CREATION, BYPASS, ARTERIAL, FEMORAL TO POPLITEAL, USING GRAFT, LEFT LOWER EXTREMITY;  Surgeon: Teddy Huber MD;  Location: [facility];  Service: Vascular;  Laterality: Left;  REQUEST 7:15 A.M. START----COVID NEGATIVE ON 8/17  1ST CASE STARTE PER LEANA ON 8/7/2020 @ 942AM-LO  RN PREOP 8/12/2020   T/S-----CLEARED BY CARDS-------PENDING INSURANCE    DEBRIDEMENT OF FOOT Left 9/8/2020    Procedure: DEBRIDEMENT, FOOT;  Surgeon: Rosio Mayes DPM;  Location: [facility];  Service: Podiatry;  Laterality: Left;   request neoxx .   RN Pre Op 9-4-2020, Covid negative on 9/5/20. C A    DEBRIDEMENT OF FOOT  3/4/2021    Procedure: DEBRIDEMENT, FOOT;  Surgeon: Teddy Huber MD;  Location: [facility] OR;  Service: Vascular;;    DEBRIDEMENT OF FOOT Left 3/9/2021    Procedure: DEBRIDEMENT, FOOT, bone biopsy;  Surgeon: Rosio Mayes DPM;  Location: [facility] OR;  Service: Podiatry;  Laterality: Left;  Request neoxx---COVID IN AM  REQUESTING NOON START  RN Phone Pre op.On Blood thinners Plavix and Eliquis.  Covid am of surgery. C A    DEBRIDEMENT OF FOOT Left 5/4/2021    Procedure: DEBRIDEMENT, FOOT;  Surgeon: Farooq Morley DPM;  Location: [address];  Service: Podiatry;  Laterality: Left;    ESOPHAGOGASTRODUODENOSCOPY N/A 11/7/2024    Procedure: EGD (ESOPHAGOGASTRODUODENOSCOPY);  Surgeon: oYny Cancino MD;  Location: [facility];  Service: Endoscopy;  Laterality: N/A;    INSERTION OF TUNNELED CENTRAL VENOUS HEMODIALYSIS CATHETER N/A 1/27/2020    Procedure: Insertion, Catheter, Central Venous, Hemodialysis;  Surgeon: ESTEBAN Gomez III, MD;  Location: [facility] CATH LAB;  Service: Peripheral Vascular;  Laterality: N/A;    INSERTION OF TUNNELED CENTRAL VENOUS HEMODIALYSIS CATHETER  5/10/2023    Procedure: Insertion, Catheter, Central Venous, Hemodialysis;  Surgeon: Romulo Queen MD;  Location: [facility] CATH LAB;  Service: Cardiology;;    PERCUTANEOUS TRANSLUMINAL ANGIOPLASTY N/A 3/4/2021    Procedure: PTA (ANGIOPLASTY, PERCUTANEOUS, TRANSLUMINAL);  Surgeon: Teddy Huber MD;  Location: [facility] OR;  Service: Vascular;  Laterality: N/A;    REMOVAL OF ARTERIOVENOUS GRAFT Left 5/27/2021    Procedure: REMOVAL, GRAFT, LEFT LOWER EXTREMITY, WOUND EXPLORATION;  Surgeon: Teddy Huber MD;  [address];  Service: Vascular;  Laterality: Left;    REMOVAL OF NAIL OF DIGIT Left 3/9/2021    Procedure: REMOVAL, NAIL, DIGIT;  Surgeon: Rosio Mayes DPM;  Location: [facility] OR;  Service: Podiatry;  Laterality: Left;     RIGHT HEART CATHETERIZATION Right 5/10/2023    Procedure: INSERTION, CATHETER, RIGHT HEART;  Surgeon: Romulo Queen MD;  Location: [facility] CATH LAB;  Service: Cardiology;  Laterality: Right;    THROMBECTOMY Left 3/4/2021    Procedure: THROMBECTOMY, LEFT LOWER EXTREMITY BYPASS GRAFT, ANGIOGRAM, POSSIBLE INTERVENTION, POSSIBLE LEFT LOWER EXTREMITY BYPASS;  Surgeon: Teddy Huber MD;  Location: [facility] OR;  Service: Vascular;  Laterality: Left;    THROMBECTOMY Left 4/29/2021    Procedure: GRAFT THROMBECTOMY, LEFT LOWER EXTREMITY;  Surgeon: Teddy Huber MD;  Location: [facility] OR 2ND FLR;  Service: Vascular;  Laterality: Left;  14.5 min  1179.85 mGy  341.01 Gycm2  240 ml dye    THROMBECTOMY  10/22/2021    Procedure: THROMBECTOMY;  Surgeon: Saad Arenas MD;  Location: [facility] CATH LAB/EP;  Service: Cardiology;;     Family History   Problem Relation Name Age of Onset    Diabetes Mother      Diabetes Father      Heart disease Maternal Grandmother      No Known Problems Maternal Grandfather      Diabetes Paternal Grandmother      No Known Problems Paternal Grandfather      Anesthesia problems Neg Hx       Social History     Tobacco Use    Smoking status: Former    Smokeless tobacco: Never   Substance Use Topics    Alcohol use: No    Drug use: Yes     Types: Marijuana     Comment: occassional     Review of Systems    Physical Exam     Initial Vitals [11/15/24 1259]   BP Pulse Resp Temp SpO2   125/81 85 18 98.4 °F (36.9 °C) 99 %      MAP       --         Physical Exam    Nursing note and vitals reviewed.  Constitutional:   Chronically ill appearing   HENT:   Head: Normocephalic and atraumatic.   Eyes: EOM are normal. Pupils are equal, round, and reactive to light.   Neck: Neck supple.   Normal range of motion.  Cardiovascular:  Normal rate, regular rhythm and intact distal pulses.           No murmur heard.  Pulmonary/Chest: Breath sounds normal. No respiratory distress. She has no wheezes.   Abdominal: Abdomen is  soft. She exhibits no distension. There is no abdominal tenderness.   Musculoskeletal:      Cervical back: Normal range of motion and neck supple.      Comments: Bilateral AKAs. Left AKA with distal tenderness and purulence      Neurological: She is alert and oriented to person, place, and time.   Skin: Skin is warm and dry.         ED Course   Procedures  Labs Reviewed   CBC W/ AUTO DIFFERENTIAL - Abnormal       Result Value    WBC 10.15      RBC 2.52 (*)     Hemoglobin 7.9 (*)     Hematocrit 25.2 (*)      (*)     MCH 31.3 (*)     MCHC 31.3 (*)     RDW 19.5 (*)     Platelets 406      MPV 11.7      Immature Granulocytes 0.6 (*)     Gran # (ANC) 7.9 (*)     Immature Grans (Abs) 0.06 (*)     Lymph # 0.8 (*)     Mono # 1.1 (*)     Eos # 0.2      Baso # 0.16      nRBC 0      Gran % 77.7 (*)     Lymph % 7.5 (*)     Mono % 10.5      Eosinophil % 2.1      Basophil % 1.6      Differential Method Automated     COMPREHENSIVE METABOLIC PANEL - Abnormal    Sodium 132 (*)     Potassium 4.8      Chloride 101      CO2 18 (*)     Glucose 108      BUN 30 (*)     Creatinine 3.7 (*)     Calcium 9.0      Total Protein 8.6 (*)     Albumin 2.0 (*)     Total Bilirubin 0.4      Alkaline Phosphatase 362 (*)     AST 38      ALT 19      eGFR 13.6 (*)     Anion Gap 13     CULTURE, BLOOD   CULTURE, BLOOD   URINALYSIS, REFLEX TO URINE CULTURE   ISTAT LACTATE    POC Lactate 1.70      Sample VENOUS      Site Other      Allens Test N/A     ISTAT CHEM8   POCT GLUCOSE MONITORING CONTINUOUS        ECG Results              EKG 12-lead (Final result)        Collection Time Result Time QRS Duration OHS QTC Calculation    11/15/24 15:27:51 11/15/24 15:56:12 88 486                     Final result by Interface, Lab In [facility] (11/15/24 15:56:19)                   Narrative:    Test Reason : M25.569,    Vent. Rate :  79 BPM     Atrial Rate :  79 BPM     P-R Int : 166 ms          QRS Dur :  88 ms      QT Int : 424 ms       P-R-T Axes :  29  39 124  degrees    QTcB Int : 486 ms    Sinus rhythm with occasional Premature ventricular complexes and Premature  atrial complexes  Abnormal R wave progression in the precordial leads  Abnormal ECG  When compared with ECG of 13-Nov-2024 21:31,  Premature ventricular complexes are now Present  ST no longer depressed in Lateral leads  Nonspecific T wave abnormality no longer evident in Inferior leads  Nonspecific T wave abnormality no longer evident in Anterior leads  Confirmed by Roosevelt Schmitt ([id number]) on 11/15/2024 3:56:09 PM    Referred By: AAAREFERRAL SELF           Confirmed By: Roosevelt Schmitt                                  Imaging Results              X-Ray Chest AP Portable (Final result)  Result time 11/15/24 16:25:40      Final result by Abram Carvalho Jr., MD (11/15/24 16:25:40)                   Impression:      See above      Electronically signed by: Abram Carvalho MD  Date:    11/15/2024  Time:    16:25               Narrative:    EXAMINATION:  XR CHEST AP PORTABLE    CLINICAL HISTORY:  Pain in unspecified knee    TECHNIQUE:  Single frontal view of the chest was performed.    COMPARISON:  October 29, 2024    FINDINGS:  Large-bore central venous catheter tip near the SVC right atrial junction.  Multiple monitoring leads and sternal wires noted.  Heart is enlarged.  Pulmonary vasculature appears similar.  Lungs are fairly well aerated.  No confluent consolidation or pneumothorax.                                       Medications   morphine injection 4 mg (has no administration in time range)   vancomycin 1.75 g in 5 % dextrose 500 mL IVPB (has no administration in time range)   ceFEPIme injection 1 g (has no administration in time range)   sodium chloride 0.9% flush 10 mL (has no administration in time range)   naloxone 0.4 mg/mL injection 0.02 mg (has no administration in time range)   glucose chewable tablet 16 g (has no administration in time range)   glucose chewable tablet 24 g (has no administration in time  range)   glucagon (human recombinant) injection 1 mg (has no administration in time range)   acetaminophen tablet 1,000 mg (has no administration in time range)   dextrose 10% bolus 125 mL 125 mL (has no administration in time range)   dextrose 10% bolus 250 mL 250 mL (has no administration in time range)   allopurinol split tablet 50 mg (has no administration in time range)   apixaban tablet 5 mg (5 mg Oral Given 11/15/24 2115)   atorvastatin tablet 80 mg (has no administration in time range)   carvediloL tablet 3.125 mg (3.125 mg Oral Given 11/15/24 2115)   calcitRIOL capsule 0.5 mcg (has no administration in time range)   cinacalcet tablet 30 mg (has no administration in time range)   clopidogreL tablet 75 mg (has no administration in time range)   hydrALAZINE tablet 10 mg (has no administration in time range)   isosorbide dinitrate tablet 10 mg (10 mg Oral Given 11/15/24 2125)   oxyCODONE-acetaminophen 5-325 mg per tablet 1 tablet (1 tablet Oral Given 11/15/24 2114)   pregabalin capsule 75 mg (has no administration in time range)   sertraline tablet 100 mg (has no administration in time range)   sodium bicarbonate tablet 650 mg (650 mg Oral Given 11/15/24 2124)   insulin aspart U-100 pen 0-5 Units (has no administration in time range)     Medical Decision Making  Pt presents for knee pain. Ddx: cellulitis, abscess, osteo. Pt in no acute distress. Afebrile and hemodynamically stable. Concern for continued cellulitis despite outpatient management. Infectious work up initiated and pt started on broad spectrum antibiotics. Pt signed out pending CMP with ultimate plan for admission     Amount and/or Complexity of Data Reviewed  Labs: ordered.  Radiology: ordered.    Risk  Prescription drug management.                                      Clinical Impression:  Final diagnoses:  [M25.569] Knee pain          ED Disposition Condition    Observation                 Aj Teresa Jr., MD  Resident  11/15/24 4624

## 2024-11-15 NOTE — NURSING
Patient arrived to EDOU prior to this nurse getting report. Room assigned, ED nurse to call report directly to floor.

## 2024-11-16 LAB
ALBUMIN SERPL BCP-MCNC: 1.9 G/DL (ref 3.5–5.2)
ANION GAP SERPL CALC-SCNC: 11 MMOL/L (ref 8–16)
BUN SERPL-MCNC: 34 MG/DL (ref 6–20)
CALCIUM SERPL-MCNC: 8.1 MG/DL (ref 8.7–10.5)
CHLORIDE SERPL-SCNC: 100 MMOL/L (ref 95–110)
CO2 SERPL-SCNC: 21 MMOL/L (ref 23–29)
CREAT SERPL-MCNC: 4.2 MG/DL (ref 0.5–1.4)
EST. GFR  (NO RACE VARIABLE): 11.7 ML/MIN/1.73 M^2
GLUCOSE SERPL-MCNC: 146 MG/DL (ref 70–110)
PHOSPHATE SERPL-MCNC: 5.4 MG/DL (ref 2.7–4.5)
POCT GLUCOSE: 110 MG/DL (ref 70–110)
POCT GLUCOSE: 116 MG/DL (ref 70–110)
POCT GLUCOSE: 135 MG/DL (ref 70–110)
POCT GLUCOSE: 177 MG/DL (ref 70–110)
POTASSIUM SERPL-SCNC: 4.9 MMOL/L (ref 3.5–5.1)
SODIUM SERPL-SCNC: 132 MMOL/L (ref 136–145)
VANCOMYCIN SERPL-MCNC: 26.4 UG/ML

## 2024-11-16 PROCEDURE — 80069 RENAL FUNCTION PANEL: CPT | Mod: HCNC

## 2024-11-16 PROCEDURE — 25000003 PHARM REV CODE 250: Mod: HCNC

## 2024-11-16 PROCEDURE — 63600175 PHARM REV CODE 636 W HCPCS: Mod: JG,HCNC

## 2024-11-16 PROCEDURE — 90935 HEMODIALYSIS ONE EVALUATION: CPT | Mod: HCNC,,,

## 2024-11-16 PROCEDURE — 63600175 PHARM REV CODE 636 W HCPCS: Mod: HCNC

## 2024-11-16 PROCEDURE — 99214 OFFICE O/P EST MOD 30 MIN: CPT | Mod: 25,HCNC,,

## 2024-11-16 PROCEDURE — 25000003 PHARM REV CODE 250: Mod: HCNC | Performed by: INTERNAL MEDICINE

## 2024-11-16 PROCEDURE — 80100016 HC MAINTENANCE HEMODIALYSIS: Mod: HCNC

## 2024-11-16 PROCEDURE — 21400001 HC TELEMETRY ROOM: Mod: HCNC

## 2024-11-16 RX ORDER — HEPARIN SODIUM 1000 [USP'U]/ML
1000 INJECTION, SOLUTION INTRAVENOUS; SUBCUTANEOUS
Status: COMPLETED | OUTPATIENT
Start: 2024-11-16 | End: 2024-12-04

## 2024-11-16 RX ORDER — HYDROCODONE BITARTRATE AND ACETAMINOPHEN 5; 325 MG/1; MG/1
1 TABLET ORAL ONCE
Status: COMPLETED | OUTPATIENT
Start: 2024-11-16 | End: 2024-11-16

## 2024-11-16 RX ORDER — SODIUM CHLORIDE 9 MG/ML
INJECTION, SOLUTION INTRAVENOUS ONCE
Status: COMPLETED | OUTPATIENT
Start: 2024-11-16 | End: 2024-11-16

## 2024-11-16 RX ADMIN — CLOPIDOGREL BISULFATE 75 MG: 75 TABLET ORAL at 01:11

## 2024-11-16 RX ADMIN — APIXABAN 5 MG: 5 TABLET, FILM COATED ORAL at 01:11

## 2024-11-16 RX ADMIN — APIXABAN 5 MG: 5 TABLET, FILM COATED ORAL at 10:11

## 2024-11-16 RX ADMIN — SERTRALINE HYDROCHLORIDE 100 MG: 50 TABLET ORAL at 01:11

## 2024-11-16 RX ADMIN — HYDROCODONE BITARTRATE AND ACETAMINOPHEN 1 TABLET: 5; 325 TABLET ORAL at 03:11

## 2024-11-16 RX ADMIN — SODIUM BICARBONATE 650 MG TABLET 650 MG: at 01:11

## 2024-11-16 RX ADMIN — CARVEDILOL 3.12 MG: 3.12 TABLET, FILM COATED ORAL at 01:11

## 2024-11-16 RX ADMIN — SODIUM CHLORIDE: 9 INJECTION, SOLUTION INTRAVENOUS at 08:11

## 2024-11-16 RX ADMIN — ATORVASTATIN CALCIUM 80 MG: 40 TABLET, FILM COATED ORAL at 01:11

## 2024-11-16 RX ADMIN — HYDRALAZINE HYDROCHLORIDE 10 MG: 10 TABLET ORAL at 12:11

## 2024-11-16 RX ADMIN — ACETAMINOPHEN 1000 MG: 500 TABLET ORAL at 10:11

## 2024-11-16 RX ADMIN — SODIUM BICARBONATE 650 MG TABLET 650 MG: at 10:11

## 2024-11-16 RX ADMIN — HYDRALAZINE HYDROCHLORIDE 10 MG: 10 TABLET ORAL at 10:11

## 2024-11-16 RX ADMIN — CALCITRIOL 0.5 MCG: 0.5 CAPSULE, LIQUID FILLED ORAL at 01:11

## 2024-11-16 RX ADMIN — PREGABALIN 75 MG: 75 CAPSULE ORAL at 04:11

## 2024-11-16 RX ADMIN — ISOSORBIDE DINITRATE 10 MG: 10 TABLET ORAL at 12:11

## 2024-11-16 RX ADMIN — EPOETIN ALFA-EPBX 3560 UNITS: 4000 INJECTION, SOLUTION INTRAVENOUS; SUBCUTANEOUS at 11:11

## 2024-11-16 RX ADMIN — ALLOPURINOL 50 MG: 300 TABLET ORAL at 01:11

## 2024-11-16 RX ADMIN — HEPARIN SODIUM 1000 UNITS: 1000 INJECTION, SOLUTION INTRAVENOUS; SUBCUTANEOUS at 12:11

## 2024-11-16 NOTE — HOSPITAL COURSE
Admitted for failed outpatient treatment of LLE cellulitis.  Previously admitted to this facility 10/28-11/12 for septic shock d/t LLE cellulitis and was discharged to SNF on Augmentin and doxycycline.  Continued purulence and LLE pain at facility and pt readmitted for ongoing infection.  ID consulted, treated with vanc and cefepime.  MRI revealed multiple fluid collections of the LLE and no evidence of osteomyelitis.  Vascular imaging revealed PAD, specifically left common iliac occlusion.  Vascular surgery consulted, s/p Aortogram and BLE angiogram with left common iliac stent placement and left AKA stump debridement on 11/22.  Postoperative anemia requiring 2 units PRBCs 11/23 and 11/24.  No blood in stool or urine.  Contacted vascular surgery morning of 11/24 to evaluate left upper leg, no concern for ongoing bleeding.  S/p wound VAC change 11/27 with vascular surgery and Wound Care took over vac management..  S/p course of vanc and cefepime.      Hyponatremia throughout admission, managed with routine hemodialysis.  Nephrology consulted who recommended fluid restriction of 800 mL per day.  HD on 12/04 to get pt back on track for usual MWF schedule.    On chart review, noted pt had previous echo with severely reduced EF (15% in May 20, 2024) and WMA.  Repeat echo 11/29 EF 35-40%, hypokinesis of anterior and inferior septum, RV with severely reduced systolic function, moderate to severe TR.  Previously on bidil and Coreg.  BP low through admission, Isordil and Coreg dose decreased, hydralazine held.  Discharged on that regimen and recommend close follow up with Cardiology    Pt and  declined discharge to SNF.  CM/SW assisted with obtaining home wound VAC and pt discharged home with home health.    The patient's chronic medical conditions were managed appropriately. Discharge plan of care was discussed at length with patient including patient's need for close outpatient follow-up. Medication counseling also  took place with the patient being educated on potential side effects/adverse events. Patient also counseled about avoiding driving, operating machinery, or participating in any activities that may pose harm to self or others if they are taking medications that cause drowsiness or altered mental status. Patient also counseled to take medicines as prescribed and do not drink alcohol, use tobacco products, or use illicit substances. Patient counseled to return to the hospital or seek medical care if baseline status should suddenly change, return of symptoms occurs, or for any new or concerning symptoms that arise. Patient was in agreement with plan of care going forward and was then discharged.  Recommend close follow-up with PCP, HD Clinic, and Nephrology.  Referral placed for vascular surgery and Cardiology.      Physical Exam  HENT:      Mouth: Mucous membranes are moist.   Eyes:      Conjunctiva/sclera: Conjunctivae normal.   Cardiovascular:      Rate and Rhythm: Normal rate and regular rhythm.   Pulmonary:      Effort: No respiratory distress.   Abdominal:      General: There is no distension.   Musculoskeletal:      Cervical back: Normal range of motion.      Left thigh: Edema present.      Comments: Wound vac is in place on stump draining serosanguineous fluid.   Skin:     General: Skin is warm and dry.   Neurological:      General: No focal deficit present.      Mental Status: She is alert.   Psychiatric:         Mood and Affect: Mood normal.         Behavior: Behavior normal.

## 2024-11-16 NOTE — NURSING
Return to the room by bed with transporter times 2 assisting. MRI report was not able to perform test

## 2024-11-16 NOTE — PROGRESS NOTES
Pharmacokinetic Initial Assessment: IV Vancomycin    Assessment/Plan:    - Patient was on vancomycin regimen outpatient. Did not receive any doses since 11/14.   - Desired empiric serum trough concentration is 10 to 15 mcg/mL  - Vancomycin random was supratherapeutic at 26.4 on 11/15  - Patient received HD today 11/16. No random was drawn prior to HD.  - Will not re-dose given random was > 25.   - Pharmacy will continue to follow and monitor vancomycin. Will order vancomycin random prior to next HD session. (Unsure of schedule)    Please contact pharmacy at extension [phone] with any questions regarding this assessment.     Thank you for the consult,   Cee Diego, PharmD    Patient brief summary:  Suyapa Connelly is a 59 y.o. female initiated on antimicrobial therapy with IV Vancomycin for treatment of suspected skin & soft tissue infection    Drug Allergies:   Review of patient's allergies indicates:   Allergen Reactions    Ciprofloxacin Itching    Iodine      Kidney injury    Pcn [penicillins]      Rash; tolerated ceftriaxone on 1/13/20  Tolerating Augmentin November 2024     Actual Body Weight:   71.2 kg    Renal Function:   Estimated Creatinine Clearance: 11 mL/min (A) (based on SCr of 4.2 mg/dL (H)).,     Dialysis Method (if applicable):  intermittent HD    CBC:  Recent Labs   Lab Result Units 11/13/24  2120 11/15/24  1427   WBC K/uL 13.88* 10.15   Hemoglobin g/dL 7.7* 7.9*   Hematocrit % 24.0* 25.2*   Platelets K/uL 393 406   Gran % % 81.3* 77.7*   Lymph % % 4.4* 7.5*   Mono % % 11.0 10.5   Eosinophil % % 1.8 2.1   Basophil % % 0.9 1.6   Differential Method  Automated Automated     Metabolic Panel:  Recent Labs   Lab Result Units 11/13/24  2120 11/15/24  1533 11/16/24  0836   Sodium mmol/L 133* 132* 132*   Potassium mmol/L 3.6 4.8 4.9   Chloride mmol/L 101 101 100   CO2 mmol/L 22* 18* 21*   Glucose mg/dL 142* 108 146*   BUN mg/dL 15 30* 34*   Creatinine mg/dL 2.1* 3.7* 4.2*   Albumin g/dL 2.0* 2.0* 1.9*    Total Bilirubin mg/dL 0.4 0.4  --    Alkaline Phosphatase U/L 372* 362*  --    AST U/L 29 38  --    ALT U/L 17 19  --    Phosphorus mg/dL  --   --  5.4*     Drug levels:  Recent Labs   Lab Result Units 11/15/24  1533   Vancomycin, Random ug/mL 26.4     Microbiologic Results:  Microbiology Results (last 7 days)       Procedure Component Value Units Date/Time    Blood culture x two cultures. Draw prior to antibiotics. [[id number]] Collected: 11/15/24 1543    Order Status: Completed Specimen: Blood from Peripheral, Antecubital, Left Updated: 11/16/24 0115     Blood Culture, Routine No Growth to date    Narrative:      Aerobic and anaerobic    Blood culture x two cultures. Draw prior to antibiotics. [[id number]] Collected: 11/15/24 1543    Order Status: Completed Specimen: Blood from Peripheral, Forearm, Right Updated: 11/16/24 0115     Blood Culture, Routine No Growth to date    Narrative:      Aerobic and anaerobic

## 2024-11-16 NOTE — PLAN OF CARE
[facility] - Med Surg  Initial Discharge Assessment       Primary Care Provider: Vanessa Noel MD    Admission Diagnosis: Knee pain [M25.569]  Chest pain [R07.9]    Admission Date: 11/15/2024  Expected Discharge Date: 11/18/2024    Transition of Care Barriers: None    Payor: [employer] MANAGED MEDICARE / Plan: [employer] MEDICARE SELECT PARTNER / Product Type: Medicare Advantage /     Extended Emergency Contact Information  Primary Emergency Contact: Tea Sal [place]  Mobile Phone: [phone]  Relation: Mother  Secondary Emergency Contact: Jen Arias  Mobile Phone: [phone]  Relation: Relative    Discharge Plan A: Return to nursing home  Discharge Plan B: Return to Nursing Home      [facility] - [place] - [address] [facility] [address]  [id number] [facility] [address]  [facility] [address]  Phone: [phone] Fax: [phone]    SW met with patient at bedside to complete discharge planning assessment.  Patient alert and oriented.  Patient states she is a resident at [facility] and will return upon discharge.    Initial Assessment (most recent)       Adult Discharge Assessment - 11/16/24 1416          Discharge Assessment    Assessment Type Discharge Planning Assessment     Confirmed/corrected address, phone number and insurance Yes     Confirmed Demographics Correct on Facesheet     Source of Information patient     Does patient/caregiver understand observation status Yes     Communicated DEVAN with patient/caregiver Yes     People in Home facility resident     Facility Arrived From: [facility]     Do you expect to return to your current living situation? Yes     Do you have help at home or someone to help you manage your care at home? Yes     Who are your caregiver(s) and their phone number(s)? staff     Prior to hospitilization cognitive status: Alert/Oriented     Current cognitive status: Alert/Oriented     Readmission within 30 days? Yes     Do you take prescription  medications? Yes     Do you have prescription coverage? Yes     Do you have any problems affording any of your prescribed medications? No     Is the patient taking medications as prescribed? yes     Who is going to help you get home at discharge? staff     How do you get to doctors appointments? health plan transportation;agency     Are you on dialysis? No     Do you take coumadin? No     Discharge Plan A Return to nursing home     Discharge Plan B Return to Nursing Home     DME Needed Upon Discharge  none     Discharge Plan discussed with: Patient     Transition of Care Barriers None

## 2024-11-16 NOTE — ASSESSMENT & PLAN NOTE
59 year old female hx of ESRD on HD TTS admitted for cellulitis left thigh. Last HD 11/14/24. Nephrology consulted for HD management.    Nephrology History  -Outpatient HD unit: [facility] ?  -Nephrologist: MD Kerry  -HD tx days: TTS  -HD tx time: ?  -Last HD tx: 11/14/24  -HD access: L TDC  -HD modality: iHD  -Residual urine: anuric  -EDW:  67-67.5 kg ?    Plan/Recommendations  ESRD on HD:  -Continue TTS schedule. UF goal 2-2.5 L as tolerated  -Seen on HD, tolerating tx well, no complaints  -Will continue iHD thrice weekly unless emergent indication arises  -Strict I&Os  -Pre & post HD weight  Anemia in ESRD  -Hgb goal 10-11, hgb 7.9, resume OP Epo  -Iron c/I in the setting of infection  Mineral Bone Disease in ESRD  -Trend phos, uncertain if patient still on OP binders, resume if uncontrolled  -Continue OP sensipar and calcitriol  -sevelamer 800 mg PO TIDWM  -Renal diet if not NPO

## 2024-11-16 NOTE — PLAN OF CARE
Problem: Adult Inpatient Plan of Care  Goal: Plan of Care Review  Outcome: Progressing  Goal: Patient-Specific Goal (Individualized)  Outcome: Progressing  Goal: Absence of Hospital-Acquired Illness or Injury  Outcome: Progressing  Goal: Optimal Comfort and Wellbeing  Outcome: Progressing  Goal: Readiness for Transition of Care  Outcome: Progressing     Problem: Bariatric Environmental Safety  Goal: Safety Maintained with Care  Outcome: Progressing     Problem: Infection  Goal: Absence of Infection Signs and Symptoms  Outcome: Progressing   Awake and alert with occasional confusion. I have been patient nurse all night and she has recognized me until approx 1 hour ago she asked me was I her nurse. the patient went to have MRI and they called and stated pt was complaining of pain and wound not be still and was not able to perform liyah. I have observed patient complain of pain with any movement noted. SKin has several decubitus to max buttock shearing and irritation noted to groins. Complain of pain twice which she verbalize improvement after taking pain med. Pt verbalize she make urine every once and a while. -160's this shift. Other VSS. No fall

## 2024-11-16 NOTE — SUBJECTIVE & OBJECTIVE
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    Osteomyelitis     Osteomyelitis of left foot 04/30/2021    Ulcer of left foot     Vaginal delivery     x1       Past Surgical History:   Procedure Laterality Date    ABOVE-KNEE AMPUTATION Left 5/18/2021    Procedure: AMPUTATION, ABOVE KNEE;  Surgeon: Teddy Huber MD;  Location: [facility] OR [address] [place] [address];  Service: Vascular;  Laterality: Left;    ABOVE-KNEE AMPUTATION Right 3/18/2022    Procedure: AMPUTATION, ABOVE KNEE;  Surgeon: DAYNE Florez II, MD;  Location: [facility] OR [address] [place] [address];  Service: Vascular;  Laterality: Right;    Angiogram - Right Extremity Right 7/9/15    angiogram-left leg  10/6/15    ANGIOGRAPHY OF LOWER EXTREMITY Left 4/29/2021    Procedure: ANGIOGRAM, LOWER EXTREMITY;  Surgeon: Teddy Huber MD;  Location: [facility] OR [address] [place] [address];  Service: Vascular;  Laterality: Left;    BELOW KNEE AMPUTATION OF LOWER EXTREMITY Right 12/28/2021    Procedure: AMPUTATION, BELOW KNEE;  Surgeon: Kaitlyn Rojas MD;  Location: [facility] OR;  Service: General;  Laterality: Right;    CATHETERIZATION OF BOTH LEFT AND RIGHT HEART N/A 12/18/2019    Procedure: CATHETERIZATION, HEART, BOTH LEFT AND RIGHT;  Surgeon: Que Fernando III, MD;  Location: [facility] CATH LAB;  Service: Cardiology;  Laterality: N/A;    CORONARY ANGIOGRAPHY N/A 12/18/2019    Procedure: ANGIOGRAM, CORONARY ARTERY;  Surgeon: Que Fernando III, MD;  Location: [facility] CATH LAB;  Service: Cardiology;  Laterality: N/A;    CORONARY ANGIOGRAPHY INCLUDING BYPASS GRAFTS WITH CATHETERIZATION OF LEFT HEART N/A 7/28/2020    Procedure: ANGIOGRAM, CORONARY, INCLUDING BYPASS GRAFT, WITH LEFT HEART CATHETERIZATION, 9 am;  Surgeon: Rachel Easley MD;   Location: [facility] CATH LAB;  Service: Cardiology;  Laterality: N/A;    CORONARY ARTERY BYPASS GRAFT (CABG) N/A 1/14/2020    Procedure: CORONARY ARTERY BYPASS GRAFT (CABG) x 1     Off Pump;  Surgeon: Huang Altamirano MD;  Location: [facility] OR [address] [place] [address];  Service: Cardiovascular;  Laterality: N/A;    CREATION OF FEMORAL-TIBIAL ARTERY BYPASS Left 4/29/2021    Procedure: CREATION, BYPASS, ARTERIAL, FEMORAL TO ANTERIOR TIBIAL;  Surgeon: Teddy Huber MD;  Location: [facility] OR [address] [place] [address];  Service: Vascular;  Laterality: Left;    CREATION OF FEMOROPOPLITEAL ARTERIAL BYPASS USING GRAFT Left 8/18/2020    Procedure: CREATION, BYPASS, ARTERIAL, FEMORAL TO POPLITEAL, USING GRAFT, LEFT LOWER EXTREMITY;  Surgeon: Teddy Huber MD;  Location: [facility];  Service: Vascular;  Laterality: Left;  REQUEST 7:15 A.M. START----COVID NEGATIVE ON 8/17 1ST CASE STARTE PER LEANA ON 8/7/2020 @ 942AM-  RN PREOP 8/12/2020   T/S-----CLEARED BY CARDS-------PENDING INSURANCE    DEBRIDEMENT OF FOOT Left 9/8/2020    Procedure: DEBRIDEMENT, FOOT;  Surgeon: Rosio Mayes DPM;  Location: [facility];  Service: Podiatry;  Laterality: Left;  request neoxx .   RN Pre Op 9-4-2020, Covid negative on 9/5/20. C A    DEBRIDEMENT OF FOOT  3/4/2021    Procedure: DEBRIDEMENT, FOOT;  Surgeon: Teddy Huber MD;  Location: [facility] OR;  Service: Vascular;;    DEBRIDEMENT OF FOOT Left 3/9/2021    Procedure: DEBRIDEMENT, FOOT, bone biopsy;  Surgeon: Roiso Mayes DPM;  Location: [facility] OR;  Service: Podiatry;  Laterality: Left;  Request neoxx---COVID IN AM  REQUESTING NOON START  RN Phone Pre op.On Blood thinners Plavix and Eliquis.  Covid am of surgery. C A    DEBRIDEMENT OF FOOT Left 5/4/2021    Procedure: DEBRIDEMENT, FOOT;  Surgeon: Farooq Morley DPM;  Location: [address];  Service: Podiatry;  Laterality: Left;    ESOPHAGOGASTRODUODENOSCOPY N/A 11/7/2024    Procedure: EGD (ESOPHAGOGASTRODUODENOSCOPY);  Surgeon: Yony Cancino MD;   Location: [facility] ENDO;  Service: Endoscopy;  Laterality: N/A;    INSERTION OF TUNNELED CENTRAL VENOUS HEMODIALYSIS CATHETER N/A 1/27/2020    Procedure: Insertion, Catheter, Central Venous, Hemodialysis;  Surgeon: ESTEBAN Gomez III, MD;  Location: [facility] CATH LAB;  Service: Peripheral Vascular;  Laterality: N/A;    INSERTION OF TUNNELED CENTRAL VENOUS HEMODIALYSIS CATHETER  5/10/2023    Procedure: Insertion, Catheter, Central Venous, Hemodialysis;  Surgeon: Romulo Queen MD;  Location: [facility] CATH LAB;  Service: Cardiology;;    PERCUTANEOUS TRANSLUMINAL ANGIOPLASTY N/A 3/4/2021    Procedure: PTA (ANGIOPLASTY, PERCUTANEOUS, TRANSLUMINAL);  Surgeon: Teddy Huber MD;  Location: [facility] OR;  Service: Vascular;  Laterality: N/A;    REMOVAL OF ARTERIOVENOUS GRAFT Left 5/27/2021    Procedure: REMOVAL, GRAFT, LEFT LOWER EXTREMITY, WOUND EXPLORATION;  Surgeon: Teddy Huber MD;  Location: [facility] 2ND FLR;  Service: Vascular;  Laterality: Left;    REMOVAL OF NAIL OF DIGIT Left 3/9/2021    Procedure: REMOVAL, NAIL, DIGIT;  Surgeon: Rosio Mayes DPM;  Location: [facility] OR;  Service: Podiatry;  Laterality: Left;    RIGHT HEART CATHETERIZATION Right 5/10/2023    Procedure: INSERTION, CATHETER, RIGHT HEART;  Surgeon: Romulo Queen MD;  Location: [facility] CATH LAB;  Service: Cardiology;  Laterality: Right;    THROMBECTOMY Left 3/4/2021    Procedure: THROMBECTOMY, LEFT LOWER EXTREMITY BYPASS GRAFT, ANGIOGRAM, POSSIBLE INTERVENTION, POSSIBLE LEFT LOWER EXTREMITY BYPASS;  Surgeon: Teddy Huber MD;  Location: [facility] OR;  Service: Vascular;  Laterality: Left;    THROMBECTOMY Left 4/29/2021    Procedure: GRAFT THROMBECTOMY, LEFT LOWER EXTREMITY;  Surgeon: Teddy Huber MD;  Location: [facility] OR 2ND FLR;  Service: Vascular;  Laterality: Left;  14.5 min  1179.85 mGy  341.01 Gycm2  240 ml dye    THROMBECTOMY  10/22/2021    Procedure: THROMBECTOMY;  Surgeon: Saad Arenas MD;  Location: [facility] CATH LAB/EP;  Service:  Cardiology;;       Review of patient's allergies indicates:   Allergen Reactions    Ciprofloxacin Itching    Iodine      Kidney injury    Pcn [penicillins]      Rash; tolerated ceftriaxone on 1/13/20  Tolerating Augmentin November 2024       No current facility-administered medications on file prior to encounter.     Current Outpatient Medications on File Prior to Encounter   Medication Sig    acetaminophen (TYLENOL) 325 MG tablet Take 2 tablets (650 mg total) by mouth every 8 (eight) hours as needed for Pain.    allopurinoL (ZYLOPRIM) 100 MG tablet Take 0.5 tablets (50 mg total) by mouth every other day.    amoxicillin-clavulanate 500-125mg (AUGMENTIN) 500-125 mg Tab Take 1 tablet (500 mg total) by mouth 2 (two) times daily. for 5 days    apixaban (ELIQUIS) 5 mg Tab Take 1 tablet (5 mg total) by mouth 2 (two) times daily.    atorvastatin (LIPITOR) 80 MG tablet Take 1 tablet (80 mg total) by mouth once daily.    blood sugar diagnostic Strp 1 each by Misc.(Non-Drug; Combo Route) route 3 (three) times daily.    blood sugar diagnostic Strp Use to check blood glucose 2 (two) times a day.    blood-glucose meter Misc TEST TWICE DAILY    blood-glucose sensor (DEXCOM G7 SENSOR) Radha 1 each by Misc.(Non-Drug; Combo Route) route once daily.    calcitRIOL (ROCALTROL) 0.5 MCG Cap Take 1 capsule (0.5 mcg total) by mouth once daily.    carvediloL (COREG) 6.25 MG tablet Take 0.5 tablets (3.125 mg total) by mouth 2 (two) times daily.    cinacalcet (SENSIPAR) 30 MG Tab Take 1 tablet (30 mg total) by mouth every Mon, Wed, Fri.    clopidogreL (PLAVIX) 75 mg tablet Take 1 tablet (75 mg total) by mouth once daily.    doxycycline (VIBRA-TABS) 100 MG tablet Take 1 tablet (100 mg total) by mouth every 12 (twelve) hours. for 5 days    epoetin nina (PROCRIT) 4,000 unit/mL injection Inject 1 mL (4,000 Units total) into the skin every Mon, Wed, Fri.    famotidine (PEPCID) 20 MG tablet Take 1 tablet (20 mg total) by mouth once daily.     "hydrALAZINE (APRESOLINE) 10 MG tablet Take 1 tablet (10 mg total) by mouth every 12 (twelve) hours.    insulin aspart U-100 (NOVOLOG) 100 unit/mL (3 mL) InPn pen Inject 0-10 Units into the skin before meals and at bedtime as needed (Hyperglycemia). **MODERATE CORRECTION DOSE**  Blood Glucose  mg/dL                  Pre-meal                2200  151-200                2 units                    1 unit  201-250                4 units                    2 units  251-300                6 units                    3 units  301-350                8 units                    4 units  >350                     10 units                  5 units  Administer subcutaneously if needed at times designated by monitoring  schedule.    insulin glargine U-100, Lantus, 100 unit/mL (3 mL) SubQ InPn pen Inject 5 Units into the skin every evening.    isosorbide dinitrate (ISORDIL) 10 MG tablet Take 1 tablet (10 mg total) by mouth 2 (two) times daily.    Lactobacillus rhamnosus GG (CULTURELLE) 10 billion cell capsule Take 1 capsule by mouth once daily.    lancets 33 gauge Misc TEST 3 TIMES DAILY    oxyCODONE-acetaminophen (PERCOCET) 5-325 mg per tablet Take 1 tablet by mouth every 8 (eight) hours as needed for Pain.    pen needle, diabetic 32 gauge x 5/32" Ndle 1 Units by Misc.(Non-Drug; Combo Route) route before meals as needed (SSI).    pen needle, diabetic 33 gauge x 5/32" Ndle 1 each by Misc.(Non-Drug; Combo Route) route 3 (three) times daily.    pregabalin (LYRICA) 75 MG capsule Take 1 capsule (75 mg total) by mouth Daily.    sertraline (ZOLOFT) 100 MG tablet Take 1 tablet (100 mg total) by mouth once daily.    sodium bicarbonate 650 MG tablet Take 1 tablet (650 mg total) by mouth 3 (three) times daily.    vitamin renal formula, B-complex-vitamin c-folic acid, (NEPHROCAP) 1 mg Cap Take 1 capsule by mouth once daily.    [DISCONTINUED] lancing device Misc 1 Device by Misc.(Non-Drug; Combo Route) route 2 (two) times daily with meals. "     Family History       Problem Relation (Age of Onset)    Diabetes Mother, Father, Paternal Grandmother    Heart disease Maternal Grandmother    No Known Problems Maternal Grandfather, Paternal Grandfather          Tobacco Use    Smoking status: Former    Smokeless tobacco: Never   Substance and Sexual Activity    Alcohol use: No    Drug use: Yes     Types: Marijuana     Comment: occassional    Sexual activity: Yes     Partners: Male     ROS  General ROS: negative for weight loss, weight gain, fevers, chills, night sweats  ENT ROS: negative for epistaxis, headaches or sinus pain  Ophthalmic ROS: negative for blurry vision, decreased vision, double vision or itchy eyes  Cardiovascular ROS: negative for chest pain or dyspnea on exertion  Respiratory ROS: negative for cough, shortness of breath, or wheezing  Gastrointestinal ROS: negative for abdominal pain, change in bowel habits, black or bloody stools, nausea, emesis, or bloating  Genito-Urinary ROS: negative for dysuria, trouble voiding, or hematuria  Neurological ROS: negative for TIA or stroke symptoms  Endocrine ROS: negative for hair pattern changes or unexpected weight changes  Musculoskeletal ROS: +bilateral BKA, LLE stump painful and draining.  negative for muscle pain, weakness, joint pain or joint swelling  Skin: see MSK ROS. negative for rash, wounds, skin breakdown, or issues otherwise  Hematological and Lymphatic ROS: negative for bleeding, bruising, swollen lymph nodes  Psychological ROS: negative for anxiety or depression    Objective:     Vital Signs (Most Recent):  Temp: 98.4 °F (36.9 °C) (11/15/24 1336)  Pulse: 74 (11/15/24 1740)  Resp: 18 (11/15/24 1515)  BP: 135/61 (11/15/24 1515)  SpO2: 100 % (11/15/24 1515) Vital Signs (24h Range):  Temp:  [98.4 °F (36.9 °C)] 98.4 °F (36.9 °C)  Pulse:  [74-91] 74  Resp:  [17-18] 18  SpO2:  [97 %-100 %] 100 %  BP: (125-136)/(60-85) 135/61     Weight: 70.8 kg (156 lb)  Body mass index is 47.6 kg/m².      Physical Exam  Constitutional:       Appearance: She is ill-appearing (chronically).   HENT:      Head: Normocephalic and atraumatic.      Right Ear: External ear normal.      Left Ear: External ear normal.   Eyes:      Extraocular Movements: Extraocular movements intact.      Conjunctiva/sclera: Conjunctivae normal.      Pupils: Pupils are equal, round, and reactive to light.   Cardiovascular:      Rate and Rhythm: Normal rate and regular rhythm.   Pulmonary:      Effort: Pulmonary effort is normal. No respiratory distress.   Abdominal:      General: Abdomen is flat. Bowel sounds are normal.   Musculoskeletal:         General: Tenderness (LLE) and deformity (bilateral BKA) present.   Skin:     Findings: No lesion.          Neurological:      General: No focal deficit present.      Mental Status: She is alert and oriented to person, place, and time.   Psychiatric:         Mood and Affect: Mood normal.              CRANIAL NERVES     CN III, IV, VI   Pupils are equal, round, and reactive to light.       Significant Labs: All pertinent labs within the past 24 hours have been reviewed.    Significant Imaging: I have reviewed all pertinent imaging results/findings within the past 24 hours.

## 2024-11-16 NOTE — PROGRESS NOTES
[facility]  Progress Note    Patient Name: Suyapa Connelly  MRN: [id number]  Patient Class: OP- Observation   Admission Date: 11/15/2024  Length of Stay: 0 days  Attending Physician: Cody Traore DO  Primary Care Provider: Vanessa Noel MD        Subjective:     Principal Problem:Cellulitis of left thigh        HPI:  58yo female whose PMH includes bilateral BKA, ESRD, DM, HTN, HLD presents with purulence from L stump wound.  She was discharged from this facility 11.12.24 with oral abx or LLE cellulitis after bring admitted for septic shock.  During that admission general surgery was consulted for further evaluation d/t concerns for nec fasc as there was subcutaneous emphysema noted on CT; after their clinical assessment nec fasc was ruled out.  She did receive IV abx during that admission and on discharge was transitioned to oral augmentin and doxy and she went to SNF.  She was sent back to the ED for continued purulence and LLE pain.  Surgery was consulted in the ED and have no plans for intervention at this time.  MR is pending.  White count is normal.  She has been admitted to medicine for further management.    Overview/Hospital Course:  Admitted for LLE cellulitis; failed outpatient oral abx.  Discharged from this facility 11.12.24 with oral abx or LLE cellulitis after bring admitted for septic shock; on discharge was transitioned to oral augmentin and doxy and she went to SNF.  Sent back to the ED for continued purulence and LLE pain.  Surgery was consulted in the ED and have no plans for intervention at this time.  Repeat CT here showed postop change of bilateral above knee amputation, no destructive osseous process to suggest osteomyelitis in the remaining portions of the left femur or in the visualized proximal portion of the cut right femur, skin defect along the medial aspect of the left thigh with foci of air underlying the wound, additional area of soft tissue  contour indentation and focal skin thickening along the anterior thigh, suggestive of possible infectious or inflammatory process versus region of scarring, additional extensive soft tissue edema and skin thickening of the pelvis, partially visualized right lower extremity, and left lower extremity.  MR non diagnostic due to motion artifact.  White count is normal.  Receiving vanc/cefepime.    Interval History:  Seen and evaluated on general medical floor  No acute events overnight    Clinical status improved  No new complaints    Objective:     Vital Signs (Most Recent):  Temp: 98.2 °F (36.8 °C) (11/16/24 0820)  Pulse: 75 (11/16/24 1145)  Resp: 18 (11/16/24 0820)  BP: (!) 173/71 (11/16/24 1145)  SpO2: 98 % (11/16/24 0728) Vital Signs (24h Range):  Temp:  [97.6 °F (36.4 °C)-98.8 °F (37.1 °C)] 98.2 °F (36.8 °C)  Pulse:  [67-91] 75  Resp:  [15-20] 18  SpO2:  [97 %-100 %] 98 %  BP: (125-184)/(59-94) 173/71     Weight: 71.2 kg (156 lb 15.5 oz)  Body mass index is 47.9 kg/m².  No intake or output data in the 24 hours ending 11/16/24 1153      Physical Exam  Constitutional:       Appearance: She is ill-appearing (chronically).   HENT:      Head: Normocephalic and atraumatic.      Right Ear: External ear normal.      Left Ear: External ear normal.   Eyes:      Extraocular Movements: Extraocular movements intact.      Conjunctiva/sclera: Conjunctivae normal.      Pupils: Pupils are equal, round, and reactive to light.   Cardiovascular:      Rate and Rhythm: Normal rate and regular rhythm.   Pulmonary:      Effort: Pulmonary effort is normal. No respiratory distress.   Abdominal:      General: Abdomen is flat. Bowel sounds are normal.   Musculoskeletal:         General: Tenderness (LLE) and deformity (bilateral BKA) present.   Skin:     Findings: No lesion.          Neurological:      General: No focal deficit present.      Mental Status: She is alert and oriented to person, place, and time.   Psychiatric:         Mood and  Affect: Mood normal.             Significant Labs: All pertinent labs within the past 24 hours have been reviewed.    Significant Imaging: I have reviewed all pertinent imaging results/findings within the past 24 hours.    Assessment/Plan:      * Cellulitis of left thigh  MR non diagnostic due to motion artifact  Cont vanc/cefepime  Surgery consulted, appreciate recommendations  Follow cultures    Severe obesity (BMI >= 40)  Body mass index is 47.6 kg/m².  Would benefit from dietary and lifestyle modifications in relation to health  Consider dietary/nutrition consult outpatient        ESRD (end stage renal disease)  Consult nephro for HD management  HD TThS    Paroxysmal atrial fibrillation  Cont eliquis    Anemia due to chronic kidney disease, on chronic dialysis  Close monitoring  No indication for transfusion at this time  Goal directed resuscitation, transfuse for Hgb<7    CAD (coronary artery disease)  Cont plavix, eliquis  Cont statin    Type 2 diabetes mellitus with hyperglycemia, with long-term current use of insulin  Slide  Diabetic diet when not NPO  ACHS glucose checks    CAROLIN (generalized anxiety disorder)  Cont zoloft    Peripheral vascular disease  Cont statin  Cont eliquis and plavix      VTE Risk Mitigation (From admission, onward)           Ordered     apixaban tablet 5 mg  2 times daily         11/15/24 1833     IP VTE HIGH RISK PATIENT  Once         11/15/24 1648     Place sequential compression device  Until discontinued         11/15/24 1648     Reason for No Pharmacological VTE Prophylaxis  Once        Question:  Reasons:  Answer:  Already adequately anticoagulated on oral Anticoagulants    11/15/24 1648                    Discharge Planning   DEVAN: 11/18/2024     Code Status: Full Code   Is the patient medically ready for discharge?:     Reason for patient still in hospital (select all that apply): Patient trending condition                     Cody Traore DO  Department of Hospital Medicine    [facility] - Med Surg

## 2024-11-16 NOTE — ED TRIAGE NOTES
Suyapa KEYSHA Connelly, a 59 y.o. female presents to the ED w/ complaint of L thigh and leg pain.    Triage note:  Chief Complaint   Patient presents with    Anemia     Hx of anemia and CKD. Hgb 6.8     Review of patient's allergies indicates:   Allergen Reactions    Ciprofloxacin Itching    Iodine      Kidney injury    Pcn [penicillins]      Rash; tolerated ceftriaxone on 1/13/20  Tolerating Augmentin November 2024     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    Osteomyelitis     Osteomyelitis of left foot 04/30/2021    Ulcer of left foot     Vaginal delivery     x1

## 2024-11-16 NOTE — ASSESSMENT & PLAN NOTE
MR non diagnostic due to motion artifact  Cont vanc/cefepime  Surgery consulted, appreciate recommendations  Follow cultures

## 2024-11-16 NOTE — HPI
Per admitting physician (11/15):  58yo female whose PMH includes bilateral BKA, ESRD, DM, HTN, HLD presents with purulence from L stump wound.  She was discharged from this facility 11.12.24 with oral abx or LLE cellulitis after bring admitted for septic shock.  During that admission general surgery was consulted for further evaluation d/t concerns for nec fasc as there was subcutaneous emphysema noted on CT; after their clinical assessment nec fasc was ruled out.  She did receive IV abx during that admission and on discharge was transitioned to oral augmentin and doxy and she went to SNF.  She was sent back to the ED for continued purulence and LLE pain.  Surgery was consulted in the ED and have no plans for intervention at this time.  MR is pending.  White count is normal.  She has been admitted to medicine for further management.

## 2024-11-16 NOTE — HPI
58yo female whose PMH includes bilateral BKA, ESRD, DM, HTN, HLD presents with purulence from L stump wound.  She was discharged from this facility 11.12.24 with oral abx or LLE cellulitis after bring admitted for septic shock.  During that admission general surgery was consulted for further evaluation d/t concerns for nec fasc as there was subcutaneous emphysema noted on CT; after their clinical assessment nec fasc was ruled out.  She did receive IV abx during that admission and on dishcharge was transitioned to oral augmentin and doxy and she went to SNF.  She was sent back to the ED for continued purulence and LLE pain.  Surgery was consulted in the ED and have no plans for intervention at this time.  MR is pending.  White count is normal.  She has been admitted to medicine for further management. Nephrology consulted for HD management in the setting of ESRD.

## 2024-11-16 NOTE — SUBJECTIVE & OBJECTIVE
Interval History:  Seen and evaluated on general medical floor  No acute events overnight    Clinical status improved  No new complaints    Objective:     Vital Signs (Most Recent):  Temp: 98.2 °F (36.8 °C) (11/16/24 0820)  Pulse: 75 (11/16/24 1145)  Resp: 18 (11/16/24 0820)  BP: (!) 173/71 (11/16/24 1145)  SpO2: 98 % (11/16/24 0728) Vital Signs (24h Range):  Temp:  [97.6 °F (36.4 °C)-98.8 °F (37.1 °C)] 98.2 °F (36.8 °C)  Pulse:  [67-91] 75  Resp:  [15-20] 18  SpO2:  [97 %-100 %] 98 %  BP: (125-184)/(59-94) 173/71     Weight: 71.2 kg (156 lb 15.5 oz)  Body mass index is 47.9 kg/m².  No intake or output data in the 24 hours ending 11/16/24 1153      Physical Exam  Constitutional:       Appearance: She is ill-appearing (chronically).   HENT:      Head: Normocephalic and atraumatic.      Right Ear: External ear normal.      Left Ear: External ear normal.   Eyes:      Extraocular Movements: Extraocular movements intact.      Conjunctiva/sclera: Conjunctivae normal.      Pupils: Pupils are equal, round, and reactive to light.   Cardiovascular:      Rate and Rhythm: Normal rate and regular rhythm.   Pulmonary:      Effort: Pulmonary effort is normal. No respiratory distress.   Abdominal:      General: Abdomen is flat. Bowel sounds are normal.   Musculoskeletal:         General: Tenderness (LLE) and deformity (bilateral BKA) present.   Skin:     Findings: No lesion.          Neurological:      General: No focal deficit present.      Mental Status: She is alert and oriented to person, place, and time.   Psychiatric:         Mood and Affect: Mood normal.             Significant Labs: All pertinent labs within the past 24 hours have been reviewed.    Significant Imaging: I have reviewed all pertinent imaging results/findings within the past 24 hours.

## 2024-11-16 NOTE — ASSESSMENT & PLAN NOTE
Awaiting MR to evaluate for abscess v osteo  Cont vanc/cefepime  Surgery consulted, appreciate recommendations  Follow cultures

## 2024-11-16 NOTE — ASSESSMENT & PLAN NOTE
Close monitoring  No indication for transfusion at this time  Goal directed resuscitation, transfuse for Hgb<7

## 2024-11-16 NOTE — ASSESSMENT & PLAN NOTE
59 year old female hx of ESRD on HD TTS admitted for cellulitis left thigh. Last HD 11/14/24. Nephrology consulted for HD management.    Nephrology History  -Outpatient HD unit: [facility]   -Nephrologist: MD Jesika  -HD tx days: TTS  -HD tx time: ?  -Last HD tx: 11/14/24  -HD access: L TDC  -HD modality: iHD  -Residual urine: anuric  -EDW:  67-67.5 kg ?    Plan/Recommendations  ESRD on HD:  -HD today to resume TTS schedule. UF goal 2-2.5 L as tolerated  -Seen on HD, tolerating tx well, no complaints  -Will continue iHD thrice weekly unless emergent indication arises  -Strict I&Os  -Pre & post HD weight  Anemia in ESRD  -Hgb goal 10-11, hgb 7.9, resume OP Epo  -Iron c/I in the setting of infection  Mineral Bone Disease in ESRD  -Trend phos, uncertain if patient still on OP binders, resume if uncontrolled  -Continue OP sensipar and calcitriol  -Renal diet if not NPO

## 2024-11-16 NOTE — PROGRESS NOTES
Hd tx complete.   2.5 L removed over 3.5 hours, tolerated well  Blood returned, both lumens flushed with NS, hep locked,capped, and wrapped  Sterile dressing change performed to catheter site using sterile technique, tolerated well  Report given to primary nurse Fely Veronica transferred from unit via hospital bed back to room 646 by transport

## 2024-11-16 NOTE — ASSESSMENT & PLAN NOTE
Body mass index is 47.6 kg/m².  Would benefit from dietary and lifestyle modifications in relation to health  Consider dietary/nutrition consult outpatient

## 2024-11-16 NOTE — CONSULTS
[facility] Surg  Nephrology  Consult Note    Patient Name: Suyapa Connelly  MRN: [id number]  Admission Date: 11/15/2024  Hospital Length of Stay: 0 days  Attending Provider: Cody Traore DO   Primary Care Physician: Vanessa Noel MD  Principal Problem:Cellulitis of left thigh    Inpatient consult to Nephrology  Consult performed by: Earnestine Morel PA-C  Consult ordered by: Cody Traore DO  Reason for consult: ESRD        Subjective:     HPI: 60yo female whose PMH includes bilateral BKA, ESRD, DM, HTN, HLD presents with purulence from L stump wound.  She was discharged from this facility 11.12.24 with oral abx or LLE cellulitis after bring admitted for septic shock.  During that admission general surgery was consulted for further evaluation d/t concerns for nec fasc as there was subcutaneous emphysema noted on CT; after their clinical assessment nec fasc was ruled out.  She did receive IV abx during that admission and on dishcharge was transitioned to oral augmentin and doxy and she went to SNF.  She was sent back to the ED for continued purulence and LLE pain.  Surgery was consulted in the ED and have no plans for intervention at this time.  MR is pending.  White count is normal.  She has been admitted to medicine for further management. Nephrology consulted for HD management in the setting of ESRD.     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    Osteomyelitis     Osteomyelitis of left foot 04/30/2021    Ulcer of left foot     Vaginal delivery     x1       Past Surgical History:   Procedure Laterality Date    ABOVE-KNEE AMPUTATION Left 5/18/2021    Procedure: AMPUTATION, ABOVE KNEE;  Surgeon: Teddy Huber MD;   [address];  Service: Vascular;  Laterality: Left;    ABOVE-KNEE AMPUTATION Right 3/18/2022    Procedure: AMPUTATION, ABOVE KNEE;  Surgeon: DAYNE Florez II, MD;  [address];  Service: Vascular;  Laterality: Right;    Angiogram - Right Extremity Right 7/9/15    angiogram-left leg  10/6/15    ANGIOGRAPHY OF LOWER EXTREMITY Left 4/29/2021    Procedure: ANGIOGRAM, LOWER EXTREMITY;  Surgeon: Teddy Huber MD;  Location: [address];  Service: Vascular;  Laterality: Left;    BELOW KNEE AMPUTATION OF LOWER EXTREMITY Right 12/28/2021    Procedure: AMPUTATION, BELOW KNEE;  Surgeon: Kaitlyn Rojas MD;  Location: [facility];  Service: General;  Laterality: Right;    CATHETERIZATION OF BOTH LEFT AND RIGHT HEART N/A 12/18/2019    Procedure: CATHETERIZATION, HEART, BOTH LEFT AND RIGHT;  Surgeon: Que Fernando III, MD;  Location: [facility] CATH LAB;  Service: Cardiology;  Laterality: N/A;    CORONARY ANGIOGRAPHY N/A 12/18/2019    Procedure: ANGIOGRAM, CORONARY ARTERY;  Surgeon: Que Fernando III, MD;  Location: [facility] CATH LAB;  Service: Cardiology;  Laterality: N/A;    CORONARY ANGIOGRAPHY INCLUDING BYPASS GRAFTS WITH CATHETERIZATION OF LEFT HEART N/A 7/28/2020    Procedure: ANGIOGRAM, CORONARY, INCLUDING BYPASS GRAFT, WITH LEFT HEART CATHETERIZATION, 9 am;  Surgeon: Rachel Easley MD;  Location: [facility] CATH LAB;  Service: Cardiology;  Laterality: N/A;    CORONARY ARTERY BYPASS GRAFT (CABG) N/A 1/14/2020    Procedure: CORONARY ARTERY BYPASS GRAFT (CABG) x 1     Off Pump;  Surgeon: Huang Altamirano MD;  Location: [address];  Service: Cardiovascular;  Laterality: N/A;    CREATION OF FEMORAL-TIBIAL ARTERY BYPASS Left 4/29/2021    Procedure: CREATION, BYPASS, ARTERIAL, FEMORAL TO ANTERIOR TIBIAL;  Surgeon: Teddy Huber MD;  Location: [address];  Service: Vascular;  Laterality: Left;    CREATION OF FEMOROPOPLITEAL ARTERIAL BYPASS USING GRAFT Left 8/18/2020     Procedure: CREATION, BYPASS, ARTERIAL, FEMORAL TO POPLITEAL, USING GRAFT, LEFT LOWER EXTREMITY;  Surgeon: Teddy Huber MD;  Location: [facility] OR;  Service: Vascular;  Laterality: Left;  REQUEST 7:15 A.M. START----COVID NEGATIVE ON 8/17  1ST CASE STARTE PER LEANA ON 8/7/2020 @ 942AM-LO  RN PREOP 8/12/2020   T/S-----CLEARED BY CARDS-------PENDING INSURANCE    DEBRIDEMENT OF FOOT Left 9/8/2020    Procedure: DEBRIDEMENT, FOOT;  Surgeon: Rosio Mayes DPM;  Location: [facility] OR;  Service: Podiatry;  Laterality: Left;  request neoxx .   RN Pre Op 9-4-2020, Covid negative on 9/5/20. C A    DEBRIDEMENT OF FOOT  3/4/2021    Procedure: DEBRIDEMENT, FOOT;  Surgeon: Teddy Huber MD;  Location: [facility] OR;  Service: Vascular;;    DEBRIDEMENT OF FOOT Left 3/9/2021    Procedure: DEBRIDEMENT, FOOT, bone biopsy;  Surgeon: Rosio Mayes DPM;  Location: [facility] OR;  Service: Podiatry;  Laterality: Left;  Request neoxx---COVID IN AM  REQUESTING NOON START  RN Phone Pre op.On Blood thinners Plavix and Eliquis.  Covid am of surgery. C A    DEBRIDEMENT OF FOOT Left 5/4/2021    Procedure: DEBRIDEMENT, FOOT;  Surgeon: Farooq Morley DPM;  Location: [address];  Service: Podiatry;  Laterality: Left;    ESOPHAGOGASTRODUODENOSCOPY N/A 11/7/2024    Procedure: EGD (ESOPHAGOGASTRODUODENOSCOPY);  Surgeon: Yony Cancino MD;  Location: [facility] ENDO;  Service: Endoscopy;  Laterality: N/A;    INSERTION OF TUNNELED CENTRAL VENOUS HEMODIALYSIS CATHETER N/A 1/27/2020    Procedure: Insertion, Catheter, Central Venous, Hemodialysis;  Surgeon: ESTEBAN Gomez III, MD;  Location: [facility] CATH LAB;  Service: Peripheral Vascular;  Laterality: N/A;    INSERTION OF TUNNELED CENTRAL VENOUS HEMODIALYSIS CATHETER  5/10/2023    Procedure: Insertion, Catheter, Central Venous, Hemodialysis;  Surgeon: Romulo Queen MD;  Location: [facility] CATH LAB;  Service: Cardiology;;    PERCUTANEOUS TRANSLUMINAL ANGIOPLASTY N/A 3/4/2021    Procedure: PTA  (ANGIOPLASTY, PERCUTANEOUS, TRANSLUMINAL);  Surgeon: Teddy Huber MD;  Location: [facility] OR;  Service: Vascular;  Laterality: N/A;    REMOVAL OF ARTERIOVENOUS GRAFT Left 5/27/2021    Procedure: REMOVAL, GRAFT, LEFT LOWER EXTREMITY, WOUND EXPLORATION;  Surgeon: Teddy Huber MD;  Location: [facility] OR 2ND FLR;  Service: Vascular;  Laterality: Left;    REMOVAL OF NAIL OF DIGIT Left 3/9/2021    Procedure: REMOVAL, NAIL, DIGIT;  Surgeon: Rosio Mayes DPM;  Location: [facility] OR;  Service: Podiatry;  Laterality: Left;    RIGHT HEART CATHETERIZATION Right 5/10/2023    Procedure: INSERTION, CATHETER, RIGHT HEART;  Surgeon: Romulo Queen MD;  Location: [facility] CATH LAB;  Service: Cardiology;  Laterality: Right;    THROMBECTOMY Left 3/4/2021    Procedure: THROMBECTOMY, LEFT LOWER EXTREMITY BYPASS GRAFT, ANGIOGRAM, POSSIBLE INTERVENTION, POSSIBLE LEFT LOWER EXTREMITY BYPASS;  Surgeon: Teddy Huber MD;  Location: [facility] OR;  Service: Vascular;  Laterality: Left;    THROMBECTOMY Left 4/29/2021    Procedure: GRAFT THROMBECTOMY, LEFT LOWER EXTREMITY;  Surgeon: Teddy Huber MD;  Location: [facility] OR 2ND FLR;  Service: Vascular;  Laterality: Left;  14.5 min  1179.85 mGy  341.01 Gycm2  240 ml dye    THROMBECTOMY  10/22/2021    Procedure: THROMBECTOMY;  Surgeon: Saad Arenas MD;  Location: [facility] CATH LAB/EP;  Service: Cardiology;;       Review of patient's allergies indicates:   Allergen Reactions    Ciprofloxacin Itching    Iodine      Kidney injury    Pcn [penicillins]      Rash; tolerated ceftriaxone on 1/13/20  Tolerating Augmentin November 2024     Current Facility-Administered Medications   Medication Frequency    0.9%  NaCl infusion Once    acetaminophen tablet 1,000 mg Q8H PRN    allopurinol split tablet 50 mg Every other day    apixaban tablet 5 mg BID    atorvastatin tablet 80 mg Daily    calcitRIOL capsule 0.5 mcg Daily    carvediloL tablet 3.125 mg BID    [START ON 11/18/2024] cinacalcet tablet 30 mg  Every Mon, Wed, Fri    clopidogreL tablet 75 mg Daily    dextrose 10% bolus 125 mL 125 mL PRN    dextrose 10% bolus 250 mL 250 mL PRN    glucagon (human recombinant) injection 1 mg PRN    glucose chewable tablet 16 g PRN    glucose chewable tablet 24 g PRN    hydrALAZINE tablet 10 mg Q12H    insulin aspart U-100 pen 0-5 Units QID (AC + HS) PRN    isosorbide dinitrate tablet 10 mg BID    naloxone 0.4 mg/mL injection 0.02 mg PRN    oxyCODONE-acetaminophen 5-325 mg per tablet 1 tablet Q8H PRN    pregabalin capsule 75 mg Daily    sertraline tablet 100 mg Daily    sodium bicarbonate tablet 650 mg TID    sodium chloride 0.9% flush 10 mL Q12H PRN    vancomycin - pharmacy to dose pharmacy to manage frequency    vancomycin 1.75 g in 5 % dextrose 500 mL IVPB Once     Family History       Problem Relation (Age of Onset)    Diabetes Mother, Father, Paternal Grandmother    Heart disease Maternal Grandmother    No Known Problems Maternal Grandfather, Paternal Grandfather          Tobacco Use    Smoking status: Former    Smokeless tobacco: Never   Substance and Sexual Activity    Alcohol use: No    Drug use: Yes     Types: Marijuana     Comment: occassional    Sexual activity: Yes     Partners: Male     Review of Systems   Constitutional:  Negative for fever.   Respiratory:  Negative for shortness of breath.    Cardiovascular:  Negative for chest pain.   Gastrointestinal:  Negative for diarrhea, nausea and vomiting.   Musculoskeletal:  Positive for myalgias (left leg pain).     Objective:     Vital Signs (Most Recent):  Temp: 98.2 °F (36.8 °C) (11/16/24 0728)  Pulse: 76 (11/16/24 0915)  Resp: 16 (11/16/24 0728)  BP: (!) 169/71 (11/16/24 0915)  SpO2: 98 % (11/16/24 0728) Vital Signs (24h Range):  Temp:  [97.6 °F (36.4 °C)-98.8 °F (37.1 °C)] 98.2 °F (36.8 °C)  Pulse:  [67-91] 76  Resp:  [15-20] 16  SpO2:  [97 %-100 %] 98 %  BP: (125-181)/(59-94) 169/71     Weight: 71.2 kg (156 lb 15.5 oz) (11/16/24 0115)  Body mass index is 47.9  kg/m².  Body surface area is 1.55 meters squared.    No intake/output data recorded.     Physical Exam  Vitals reviewed.   Constitutional:       General: She is not in acute distress.     Appearance: She is ill-appearing (chronically).   HENT:      Head: Normocephalic.   Eyes:      Conjunctiva/sclera: Conjunctivae normal.   Cardiovascular:      Rate and Rhythm: Normal rate.   Pulmonary:      Effort: Pulmonary effort is normal. No respiratory distress.      Breath sounds: No wheezing.   Abdominal:      Palpations: Abdomen is soft.      Tenderness: There is no abdominal tenderness.   Musculoskeletal:      Comments: Bilateral AKA, pitting edema present bilaterally   Skin:     General: Skin is warm and dry.   Neurological:      Mental Status: She is alert.   Psychiatric:         Mood and Affect: Mood normal.          Significant Labs:  CBC:   Recent Labs   Lab 11/15/24  1427   WBC 10.15   RBC 2.52*   HGB 7.9*   HCT 25.2*      *   MCH 31.3*   MCHC 31.3*     CMP:   Recent Labs   Lab 11/15/24  1533 11/16/24  0836    146*   CALCIUM 9.0 8.1*   ALBUMIN 2.0* 1.9*   PROT 8.6*  --    * 132*   K 4.8 4.9   CO2 18* 21*    100   BUN 30* 34*   CREATININE 3.7* 4.2*   ALKPHOS 362*  --    ALT 19  --    AST 38  --    BILITOT 0.4  --      All labs within the past 24 hours have been reviewed.    Assessment/Plan:     Renal/  ESRD (end stage renal disease)  59 year old female hx of ESRD on HD TTS admitted for cellulitis left thigh. Last HD 11/14/24. Nephrology consulted for HD management.    Nephrology History  -Outpatient HD unit: [facility]?  -Nephrologist: MD Kerry  -HD tx days: TTS  -HD tx time: ?  -Last HD tx: 11/14/24  -HD access: L TDC  -HD modality: iHD  -Residual urine: anuric  -EDW:  67-67.5 kg ?    Plan/Recommendations  ESRD on HD:  -HD today to resume TTS schedule. UF goal 2-2.5 L as tolerated  -Seen on HD, tolerating tx well, no complaints  -Will continue iHD thrice weekly unless  emergent indication arises  -Strict I&Os  -Pre & post HD weight  Anemia in ESRD  -Hgb goal 10-11, hgb 7.9, resume OP Epo  -Iron c/I in the setting of infection  Mineral Bone Disease in ESRD  -Trend phos, uncertain if patient still on OP binders, resume if uncontrolled  -Continue OP sensipar and calcitriol  -Renal diet if not NPO      ID  * Cellulitis of left thigh  -management per jax        Thank you for your consult. I will follow-up with patient. Please contact us if you have any additional questions.    Earnestine Morel PA-C  Nephrology  [facility] - Med Surg

## 2024-11-16 NOTE — PROGRESS NOTES
Patient arrived in a bed to dialysis unit.   Report received from Fely Malin per dialysis Flowsheet.     Hemodialysis initiated using the following:     Dialysis Access: L IJ CVC  Tolerated well, flows good. Blood obtained for RFP,  called for           Will Maintain telemetry and blood pressure monitoring throughout treatment.  Refer to dialysis flowsheet and MAR for details.

## 2024-11-16 NOTE — SUBJECTIVE & OBJECTIVE
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    Osteomyelitis     Osteomyelitis of left foot 04/30/2021    Ulcer of left foot     Vaginal delivery     x1       Past Surgical History:   Procedure Laterality Date    ABOVE-KNEE AMPUTATION Left 5/18/2021    Procedure: AMPUTATION, ABOVE KNEE;  Surgeon: Teddy Huber MD;  Location: [facility] OR [address] [place] [address];  Service: Vascular;  Laterality: Left;    ABOVE-KNEE AMPUTATION Right 3/18/2022    Procedure: AMPUTATION, ABOVE KNEE;  Surgeon: DAYNE Florez II, MD;  Location: [facility] OR [address] [place] [address];  Service: Vascular;  Laterality: Right;    Angiogram - Right Extremity Right 7/9/15    angiogram-left leg  10/6/15    ANGIOGRAPHY OF LOWER EXTREMITY Left 4/29/2021    Procedure: ANGIOGRAM, LOWER EXTREMITY;  Surgeon: Teddy Huber MD;  Location: [facility] OR [address] [place] [address];  Service: Vascular;  Laterality: Left;    BELOW KNEE AMPUTATION OF LOWER EXTREMITY Right 12/28/2021    Procedure: AMPUTATION, BELOW KNEE;  Surgeon: Kaitlyn Rojas MD;  Location: [facility] OR;  Service: General;  Laterality: Right;    CATHETERIZATION OF BOTH LEFT AND RIGHT HEART N/A 12/18/2019    Procedure: CATHETERIZATION, HEART, BOTH LEFT AND RIGHT;  Surgeon: Que Fernando III, MD;  Location: [facility] CATH LAB;  Service: Cardiology;  Laterality: N/A;    CORONARY ANGIOGRAPHY N/A 12/18/2019    Procedure: ANGIOGRAM, CORONARY ARTERY;  Surgeon: Que Fernando III, MD;  Location: [facility] CATH LAB;  Service: Cardiology;  Laterality: N/A;    CORONARY ANGIOGRAPHY INCLUDING BYPASS GRAFTS WITH CATHETERIZATION OF LEFT HEART N/A 7/28/2020    Procedure: ANGIOGRAM, CORONARY, INCLUDING BYPASS GRAFT, WITH LEFT HEART CATHETERIZATION, 9 am;  Surgeon: Rachel Easley MD;   Location: [facility] CATH LAB;  Service: Cardiology;  Laterality: N/A;    CORONARY ARTERY BYPASS GRAFT (CABG) N/A 1/14/2020    Procedure: CORONARY ARTERY BYPASS GRAFT (CABG) x 1     Off Pump;  Surgeon: Huang Altamirano MD;  Location: [facility] OR [address] [place] [address];  Service: Cardiovascular;  Laterality: N/A;    CREATION OF FEMORAL-TIBIAL ARTERY BYPASS Left 4/29/2021    Procedure: CREATION, BYPASS, ARTERIAL, FEMORAL TO ANTERIOR TIBIAL;  Surgeon: Teddy Huber MD;  Location: [facility] OR [address] [place] [address];  Service: Vascular;  Laterality: Left;    CREATION OF FEMOROPOPLITEAL ARTERIAL BYPASS USING GRAFT Left 8/18/2020    Procedure: CREATION, BYPASS, ARTERIAL, FEMORAL TO POPLITEAL, USING GRAFT, LEFT LOWER EXTREMITY;  Surgeon: Teddy Huber MD;  Location: [facility];  Service: Vascular;  Laterality: Left;  REQUEST 7:15 A.M. START----COVID NEGATIVE ON 8/17 1ST CASE STARTE PER LEANA ON 8/7/2020 @ 942AM-  RN PREOP 8/12/2020   T/S-----CLEARED BY CARDS-------PENDING INSURANCE    DEBRIDEMENT OF FOOT Left 9/8/2020    Procedure: DEBRIDEMENT, FOOT;  Surgeon: Rosio Mayes DPM;  Location: [facility];  Service: Podiatry;  Laterality: Left;  request neoxx .   RN Pre Op 9-4-2020, Covid negative on 9/5/20. C A    DEBRIDEMENT OF FOOT  3/4/2021    Procedure: DEBRIDEMENT, FOOT;  Surgeon: Teddy Huber MD;  Location: [facility] OR;  Service: Vascular;;    DEBRIDEMENT OF FOOT Left 3/9/2021    Procedure: DEBRIDEMENT, FOOT, bone biopsy;  Surgeon: Rosio Mayes DPM;  Location: [facility] OR;  Service: Podiatry;  Laterality: Left;  Request neoxx---COVID IN AM  REQUESTING NOON START  RN Phone Pre op.On Blood thinners Plavix and Eliquis.  Covid am of surgery. C A    DEBRIDEMENT OF FOOT Left 5/4/2021    Procedure: DEBRIDEMENT, FOOT;  Surgeon: Farooq Morley DPM;  Location: [address];  Service: Podiatry;  Laterality: Left;    ESOPHAGOGASTRODUODENOSCOPY N/A 11/7/2024    Procedure: EGD (ESOPHAGOGASTRODUODENOSCOPY);  Surgeon: Yony Cancino MD;   Location: [facility] ENDO;  Service: Endoscopy;  Laterality: N/A;    INSERTION OF TUNNELED CENTRAL VENOUS HEMODIALYSIS CATHETER N/A 1/27/2020    Procedure: Insertion, Catheter, Central Venous, Hemodialysis;  Surgeon: ESTEBAN Gomez III, MD;  Location: [facility] CATH LAB;  Service: Peripheral Vascular;  Laterality: N/A;    INSERTION OF TUNNELED CENTRAL VENOUS HEMODIALYSIS CATHETER  5/10/2023    Procedure: Insertion, Catheter, Central Venous, Hemodialysis;  Surgeon: Romulo Queen MD;  Location: [facility] CATH LAB;  Service: Cardiology;;    PERCUTANEOUS TRANSLUMINAL ANGIOPLASTY N/A 3/4/2021    Procedure: PTA (ANGIOPLASTY, PERCUTANEOUS, TRANSLUMINAL);  Surgeon: Teddy Huber MD;  Location: [facility] OR;  Service: Vascular;  Laterality: N/A;    REMOVAL OF ARTERIOVENOUS GRAFT Left 5/27/2021    Procedure: REMOVAL, GRAFT, LEFT LOWER EXTREMITY, WOUND EXPLORATION;  Surgeon: Teddy Huber MD;  Location: [facility] 2ND FLR;  Service: Vascular;  Laterality: Left;    REMOVAL OF NAIL OF DIGIT Left 3/9/2021    Procedure: REMOVAL, NAIL, DIGIT;  Surgeon: Rosio aMyes DPM;  Location: [facility] OR;  Service: Podiatry;  Laterality: Left;    RIGHT HEART CATHETERIZATION Right 5/10/2023    Procedure: INSERTION, CATHETER, RIGHT HEART;  Surgeon: Romulo Queen MD;  Location: [facility] CATH LAB;  Service: Cardiology;  Laterality: Right;    THROMBECTOMY Left 3/4/2021    Procedure: THROMBECTOMY, LEFT LOWER EXTREMITY BYPASS GRAFT, ANGIOGRAM, POSSIBLE INTERVENTION, POSSIBLE LEFT LOWER EXTREMITY BYPASS;  Surgeon: Teddy Huber MD;  Location: [facility] OR;  Service: Vascular;  Laterality: Left;    THROMBECTOMY Left 4/29/2021    Procedure: GRAFT THROMBECTOMY, LEFT LOWER EXTREMITY;  Surgeon: Teddy Huber MD;  Location: [facility] OR 2ND FLR;  Service: Vascular;  Laterality: Left;  14.5 min  1179.85 mGy  341.01 Gycm2  240 ml dye    THROMBECTOMY  10/22/2021    Procedure: THROMBECTOMY;  Surgeon: Saad Arenas MD;  Location: [facility] CATH LAB/EP;  Service:  Cardiology;;       Review of patient's allergies indicates:   Allergen Reactions    Ciprofloxacin Itching    Iodine      Kidney injury    Pcn [penicillins]      Rash; tolerated ceftriaxone on 1/13/20  Tolerating Augmentin November 2024     Current Facility-Administered Medications   Medication Frequency    0.9%  NaCl infusion Once    acetaminophen tablet 1,000 mg Q8H PRN    allopurinol split tablet 50 mg Every other day    apixaban tablet 5 mg BID    atorvastatin tablet 80 mg Daily    calcitRIOL capsule 0.5 mcg Daily    carvediloL tablet 3.125 mg BID    [START ON 11/18/2024] cinacalcet tablet 30 mg Every Mon, Wed, Fri    clopidogreL tablet 75 mg Daily    dextrose 10% bolus 125 mL 125 mL PRN    dextrose 10% bolus 250 mL 250 mL PRN    glucagon (human recombinant) injection 1 mg PRN    glucose chewable tablet 16 g PRN    glucose chewable tablet 24 g PRN    hydrALAZINE tablet 10 mg Q12H    insulin aspart U-100 pen 0-5 Units QID (AC + HS) PRN    isosorbide dinitrate tablet 10 mg BID    naloxone 0.4 mg/mL injection 0.02 mg PRN    oxyCODONE-acetaminophen 5-325 mg per tablet 1 tablet Q8H PRN    pregabalin capsule 75 mg Daily    sertraline tablet 100 mg Daily    sodium bicarbonate tablet 650 mg TID    sodium chloride 0.9% flush 10 mL Q12H PRN    vancomycin - pharmacy to dose pharmacy to manage frequency    vancomycin 1.75 g in 5 % dextrose 500 mL IVPB Once     Family History       Problem Relation (Age of Onset)    Diabetes Mother, Father, Paternal Grandmother    Heart disease Maternal Grandmother    No Known Problems Maternal Grandfather, Paternal Grandfather          Tobacco Use    Smoking status: Former    Smokeless tobacco: Never   Substance and Sexual Activity    Alcohol use: No    Drug use: Yes     Types: Marijuana     Comment: occassional    Sexual activity: Yes     Partners: Male     Review of Systems   Constitutional:  Negative for fever.   Respiratory:  Negative for shortness of breath.    Cardiovascular:  Negative  for chest pain.   Gastrointestinal:  Negative for diarrhea, nausea and vomiting.   Musculoskeletal:  Positive for myalgias (left leg pain).     Objective:     Vital Signs (Most Recent):  Temp: 98.2 °F (36.8 °C) (11/16/24 0728)  Pulse: 76 (11/16/24 0915)  Resp: 16 (11/16/24 0728)  BP: (!) 169/71 (11/16/24 0915)  SpO2: 98 % (11/16/24 0728) Vital Signs (24h Range):  Temp:  [97.6 °F (36.4 °C)-98.8 °F (37.1 °C)] 98.2 °F (36.8 °C)  Pulse:  [67-91] 76  Resp:  [15-20] 16  SpO2:  [97 %-100 %] 98 %  BP: (125-181)/(59-94) 169/71     Weight: 71.2 kg (156 lb 15.5 oz) (11/16/24 0115)  Body mass index is 47.9 kg/m².  Body surface area is 1.55 meters squared.    No intake/output data recorded.     Physical Exam  Vitals reviewed.   Constitutional:       General: She is not in acute distress.     Appearance: She is ill-appearing (chronically).   HENT:      Head: Normocephalic.   Eyes:      Conjunctiva/sclera: Conjunctivae normal.   Cardiovascular:      Rate and Rhythm: Normal rate.   Pulmonary:      Effort: Pulmonary effort is normal. No respiratory distress.      Breath sounds: No wheezing.   Abdominal:      Palpations: Abdomen is soft.      Tenderness: There is no abdominal tenderness.   Musculoskeletal:      Comments: Bilateral AKA, pitting edema present bilaterally   Skin:     General: Skin is warm and dry.   Neurological:      Mental Status: She is alert.   Psychiatric:         Mood and Affect: Mood normal.          Significant Labs:  CBC:   Recent Labs   Lab 11/15/24  1427   WBC 10.15   RBC 2.52*   HGB 7.9*   HCT 25.2*      *   MCH 31.3*   MCHC 31.3*     CMP:   Recent Labs   Lab 11/15/24  1533 11/16/24  0836    146*   CALCIUM 9.0 8.1*   ALBUMIN 2.0* 1.9*   PROT 8.6*  --    * 132*   K 4.8 4.9   CO2 18* 21*    100   BUN 30* 34*   CREATININE 3.7* 4.2*   ALKPHOS 362*  --    ALT 19  --    AST 38  --    BILITOT 0.4  --      All labs within the past 24 hours have been reviewed.

## 2024-11-16 NOTE — H&P
[facility] Medicine  History & Physical    Patient Name: Suyapa Connelly  MRN: [id number]  Patient Class: OP- Observation  Admission Date: 11/15/2024  Attending Physician: Cody Traore DO   Primary Care Provider: Vanessa Noel MD         Patient information was obtained from patient and ER records.     Subjective:     Principal Problem:Cellulitis of left thigh    Chief Complaint:   Chief Complaint   Patient presents with    Anemia     Hx of anemia and CKD. Hgb 6.8        HPI: 60yo female whose PMH includes bilateral BKA, ESRD, DM, HTN, HLD presents with purulence from L stump wound.  She was discharged from this facility 11.12.24 with oral abx or LLE cellulitis after bring admitted for septic shock.  During that admission general surgery was consulted for further evaluation d/t concerns for nec fasc as there was subcutaneous emphysema noted on CT; after their clinical assessment nec fasc was ruled out.  She did receive IV abx during that admission and on dishcharge was transitioned to oral augmentin and doxy and she went to SNF.  She was sent back to the ED for continued purulence and LLE pain.  Surgery was consulted in the ED and have no plans for intervention at this time.  MR is pending.  White count is normal.  She has been admitted to medicine for further management.    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    Osteomyelitis     Osteomyelitis of left foot 04/30/2021    Ulcer of left foot     Vaginal delivery     x1       Past Surgical History:   Procedure Laterality Date    ABOVE-KNEE AMPUTATION Left 5/18/2021    Procedure: AMPUTATION, ABOVE KNEE;  Surgeon: Teddy Huber MD;  Location: [facility] OR [address] [place] [address];   Service: Vascular;  Laterality: Left;    ABOVE-KNEE AMPUTATION Right 3/18/2022    Procedure: AMPUTATION, ABOVE KNEE;  Surgeon: DAYNE Florez II, MD;  Location: [facility] OR [address] [place] [address];  Service: Vascular;  Laterality: Right;    Angiogram - Right Extremity Right 7/9/15    angiogram-left leg  10/6/15    ANGIOGRAPHY OF LOWER EXTREMITY Left 4/29/2021    Procedure: ANGIOGRAM, LOWER EXTREMITY;  Surgeon: Teddy Huber MD;  Location: [address];  Service: Vascular;  Laterality: Left;    BELOW KNEE AMPUTATION OF LOWER EXTREMITY Right 12/28/2021    Procedure: AMPUTATION, BELOW KNEE;  Surgeon: Kaitlyn Rojas MD;  Location: [facility] OR;  Service: General;  Laterality: Right;    CATHETERIZATION OF BOTH LEFT AND RIGHT HEART N/A 12/18/2019    Procedure: CATHETERIZATION, HEART, BOTH LEFT AND RIGHT;  Surgeon: Que Fernando III, MD;  Location: [facility] CATH LAB;  Service: Cardiology;  Laterality: N/A;    CORONARY ANGIOGRAPHY N/A 12/18/2019    Procedure: ANGIOGRAM, CORONARY ARTERY;  Surgeon: Que Fernando III, MD;  Location: [facility] CATH LAB;  Service: Cardiology;  Laterality: N/A;    CORONARY ANGIOGRAPHY INCLUDING BYPASS GRAFTS WITH CATHETERIZATION OF LEFT HEART N/A 7/28/2020    Procedure: ANGIOGRAM, CORONARY, INCLUDING BYPASS GRAFT, WITH LEFT HEART CATHETERIZATION, 9 am;  Surgeon: Rachel Easley MD;  Location: [facility] CATH LAB;  Service: Cardiology;  Laterality: N/A;    CORONARY ARTERY BYPASS GRAFT (CABG) N/A 1/14/2020    Procedure: CORONARY ARTERY BYPASS GRAFT (CABG) x 1     Off Pump;  Surgeon: Huang Altamirano MD;  Location: [address];  Service: Cardiovascular;  Laterality: N/A;    CREATION OF FEMORAL-TIBIAL ARTERY BYPASS Left 4/29/2021    Procedure: CREATION, BYPASS, ARTERIAL, FEMORAL TO ANTERIOR TIBIAL;  Surgeon: Teddy Huber MD;  Location: [address];  Service: Vascular;  Laterality: Left;    CREATION OF FEMOROPOPLITEAL ARTERIAL BYPASS USING GRAFT Left 8/18/2020    Procedure: CREATION, BYPASS,  ARTERIAL, FEMORAL TO POPLITEAL, USING GRAFT, LEFT LOWER EXTREMITY;  Surgeon: Teddy Huber MD;  Location: [facility] OR;  Service: Vascular;  Laterality: Left;  REQUEST 7:15 A.M. START----COVID NEGATIVE ON 8/17  1ST CASE STARTE PER LEANA ON 8/7/2020 @ 942AM-LO  RN PREOP 8/12/2020   T/S-----CLEARED BY CARDS-------PENDING INSURANCE    DEBRIDEMENT OF FOOT Left 9/8/2020    Procedure: DEBRIDEMENT, FOOT;  Surgeon: Rosio Mayes DPM;  Location: [facility] OR;  Service: Podiatry;  Laterality: Left;  request neoxx .   RN Pre Op 9-4-2020, Covid negative on 9/5/20. C A    DEBRIDEMENT OF FOOT  3/4/2021    Procedure: DEBRIDEMENT, FOOT;  Surgeon: Teddy Huber MD;  [facility];  Service: Vascular;;    DEBRIDEMENT OF FOOT Left 3/9/2021    Procedure: DEBRIDEMENT, FOOT, bone biopsy;  Surgeon: Rosio Mayes DPM;  Location: [facility] OR;  Service: Podiatry;  Laterality: Left;  Request neoxx---COVID IN AM  REQUESTING NOON START  RN Phone Pre op.On Blood thinners Plavix and Eliquis.  Covid am of surgery. C A    DEBRIDEMENT OF FOOT Left 5/4/2021    Procedure: DEBRIDEMENT, FOOT;  Surgeon: Farooq Morley DPM;  Location: [address];  Service: Podiatry;  Laterality: Left;    ESOPHAGOGASTRODUODENOSCOPY N/A 11/7/2024    Procedure: EGD (ESOPHAGOGASTRODUODENOSCOPY);  Surgeon: Yony Cancino MD;  Location: [facility] ENDO;  Service: Endoscopy;  Laterality: N/A;    INSERTION OF TUNNELED CENTRAL VENOUS HEMODIALYSIS CATHETER N/A 1/27/2020    Procedure: Insertion, Catheter, Central Venous, Hemodialysis;  Surgeon: ESTEBAN Gomez III, MD;  Location: [facility] CATH LAB;  Service: Peripheral Vascular;  Laterality: N/A;    INSERTION OF TUNNELED CENTRAL VENOUS HEMODIALYSIS CATHETER  5/10/2023    Procedure: Insertion, Catheter, Central Venous, Hemodialysis;  Surgeon: Romulo Queen MD;  Location: [facility] CATH LAB;  Service: Cardiology;;    PERCUTANEOUS TRANSLUMINAL ANGIOPLASTY N/A 3/4/2021    Procedure: PTA (ANGIOPLASTY, PERCUTANEOUS,  TRANSLUMINAL);  Surgeon: Teddy Huber MD;  Location: [facility] OR;  Service: Vascular;  Laterality: N/A;    REMOVAL OF ARTERIOVENOUS GRAFT Left 5/27/2021    Procedure: REMOVAL, GRAFT, LEFT LOWER EXTREMITY, WOUND EXPLORATION;  Surgeon: Teddy Huber MD;  Location: [facility] OR 2ND FLR;  Service: Vascular;  Laterality: Left;    REMOVAL OF NAIL OF DIGIT Left 3/9/2021    Procedure: REMOVAL, NAIL, DIGIT;  Surgeon: Rosio Mayes DPM;  Location: [facility] OR;  Service: Podiatry;  Laterality: Left;    RIGHT HEART CATHETERIZATION Right 5/10/2023    Procedure: INSERTION, CATHETER, RIGHT HEART;  Surgeon: Romulo Queen MD;  Location: [facility] CATH LAB;  Service: Cardiology;  Laterality: Right;    THROMBECTOMY Left 3/4/2021    Procedure: THROMBECTOMY, LEFT LOWER EXTREMITY BYPASS GRAFT, ANGIOGRAM, POSSIBLE INTERVENTION, POSSIBLE LEFT LOWER EXTREMITY BYPASS;  Surgeon: Teddy Huber MD;  Location: [facility] OR;  Service: Vascular;  Laterality: Left;    THROMBECTOMY Left 4/29/2021    Procedure: GRAFT THROMBECTOMY, LEFT LOWER EXTREMITY;  Surgeon: Teddy Huber MD;  Location: [facility] OR 2ND FLR;  Service: Vascular;  Laterality: Left;  14.5 min  1179.85 mGy  341.01 Gycm2  240 ml dye    THROMBECTOMY  10/22/2021    Procedure: THROMBECTOMY;  Surgeon: Saad Arenas MD;  Location: [facility] CATH LAB/EP;  Service: Cardiology;;       Review of patient's allergies indicates:   Allergen Reactions    Ciprofloxacin Itching    Iodine      Kidney injury    Pcn [penicillins]      Rash; tolerated ceftriaxone on 1/13/20  Tolerating Augmentin November 2024       No current facility-administered medications on file prior to encounter.     Current Outpatient Medications on File Prior to Encounter   Medication Sig    acetaminophen (TYLENOL) 325 MG tablet Take 2 tablets (650 mg total) by mouth every 8 (eight) hours as needed for Pain.    allopurinoL (ZYLOPRIM) 100 MG tablet Take 0.5 tablets (50 mg total) by mouth every other day.     amoxicillin-clavulanate 500-125mg (AUGMENTIN) 500-125 mg Tab Take 1 tablet (500 mg total) by mouth 2 (two) times daily. for 5 days    apixaban (ELIQUIS) 5 mg Tab Take 1 tablet (5 mg total) by mouth 2 (two) times daily.    atorvastatin (LIPITOR) 80 MG tablet Take 1 tablet (80 mg total) by mouth once daily.    blood sugar diagnostic Strp 1 each by Misc.(Non-Drug; Combo Route) route 3 (three) times daily.    blood sugar diagnostic Strp Use to check blood glucose 2 (two) times a day.    blood-glucose meter Misc TEST TWICE DAILY    blood-glucose sensor (DEXCOM G7 SENSOR) Radha 1 each by Misc.(Non-Drug; Combo Route) route once daily.    calcitRIOL (ROCALTROL) 0.5 MCG Cap Take 1 capsule (0.5 mcg total) by mouth once daily.    carvediloL (COREG) 6.25 MG tablet Take 0.5 tablets (3.125 mg total) by mouth 2 (two) times daily.    cinacalcet (SENSIPAR) 30 MG Tab Take 1 tablet (30 mg total) by mouth every Mon, Wed, Fri.    clopidogreL (PLAVIX) 75 mg tablet Take 1 tablet (75 mg total) by mouth once daily.    doxycycline (VIBRA-TABS) 100 MG tablet Take 1 tablet (100 mg total) by mouth every 12 (twelve) hours. for 5 days    epoetin nina (PROCRIT) 4,000 unit/mL injection Inject 1 mL (4,000 Units total) into the skin every Mon, Wed, Fri.    famotidine (PEPCID) 20 MG tablet Take 1 tablet (20 mg total) by mouth once daily.    hydrALAZINE (APRESOLINE) 10 MG tablet Take 1 tablet (10 mg total) by mouth every 12 (twelve) hours.    insulin aspart U-100 (NOVOLOG) 100 unit/mL (3 mL) InPn pen Inject 0-10 Units into the skin before meals and at bedtime as needed (Hyperglycemia). **MODERATE CORRECTION DOSE**  Blood Glucose  mg/dL                  Pre-meal                2200  151-200                2 units                    1 unit  201-250                4 units                    2 units  251-300                6 units                    3 units  301-350                8 units                    4 units  >350                     10 units          "         5 units  Administer subcutaneously if needed at times designated by monitoring  schedule.    insulin glargine U-100, Lantus, 100 unit/mL (3 mL) SubQ InPn pen Inject 5 Units into the skin every evening.    isosorbide dinitrate (ISORDIL) 10 MG tablet Take 1 tablet (10 mg total) by mouth 2 (two) times daily.    Lactobacillus rhamnosus GG (CULTURELLE) 10 billion cell capsule Take 1 capsule by mouth once daily.    lancets 33 gauge Misc TEST 3 TIMES DAILY    oxyCODONE-acetaminophen (PERCOCET) 5-325 mg per tablet Take 1 tablet by mouth every 8 (eight) hours as needed for Pain.    pen needle, diabetic 32 gauge x 5/32" Ndle 1 Units by Misc.(Non-Drug; Combo Route) route before meals as needed (SSI).    pen needle, diabetic 33 gauge x 5/32" Ndle 1 each by Misc.(Non-Drug; Combo Route) route 3 (three) times daily.    pregabalin (LYRICA) 75 MG capsule Take 1 capsule (75 mg total) by mouth Daily.    sertraline (ZOLOFT) 100 MG tablet Take 1 tablet (100 mg total) by mouth once daily.    sodium bicarbonate 650 MG tablet Take 1 tablet (650 mg total) by mouth 3 (three) times daily.    vitamin renal formula, B-complex-vitamin c-folic acid, (NEPHROCAP) 1 mg Cap Take 1 capsule by mouth once daily.    [DISCONTINUED] lancing device Misc 1 Device by Misc.(Non-Drug; Combo Route) route 2 (two) times daily with meals.     Family History       Problem Relation (Age of Onset)    Diabetes Mother, Father, Paternal Grandmother    Heart disease Maternal Grandmother    No Known Problems Maternal Grandfather, Paternal Grandfather          Tobacco Use    Smoking status: Former    Smokeless tobacco: Never   Substance and Sexual Activity    Alcohol use: No    Drug use: Yes     Types: Marijuana     Comment: occassional    Sexual activity: Yes     Partners: Male     ROS  General ROS: negative for weight loss, weight gain, fevers, chills, night sweats  ENT ROS: negative for epistaxis, headaches or sinus pain  Ophthalmic ROS: negative for blurry " vision, decreased vision, double vision or itchy eyes  Cardiovascular ROS: negative for chest pain or dyspnea on exertion  Respiratory ROS: negative for cough, shortness of breath, or wheezing  Gastrointestinal ROS: negative for abdominal pain, change in bowel habits, black or bloody stools, nausea, emesis, or bloating  Genito-Urinary ROS: negative for dysuria, trouble voiding, or hematuria  Neurological ROS: negative for TIA or stroke symptoms  Endocrine ROS: negative for hair pattern changes or unexpected weight changes  Musculoskeletal ROS: +bilateral BKA, LLE stump painful and draining.  negative for muscle pain, weakness, joint pain or joint swelling  Skin: see MSK ROS. negative for rash, wounds, skin breakdown, or issues otherwise  Hematological and Lymphatic ROS: negative for bleeding, bruising, swollen lymph nodes  Psychological ROS: negative for anxiety or depression    Objective:     Vital Signs (Most Recent):  Temp: 98.4 °F (36.9 °C) (11/15/24 1336)  Pulse: 74 (11/15/24 1740)  Resp: 18 (11/15/24 1515)  BP: 135/61 (11/15/24 1515)  SpO2: 100 % (11/15/24 1515) Vital Signs (24h Range):  Temp:  [98.4 °F (36.9 °C)] 98.4 °F (36.9 °C)  Pulse:  [74-91] 74  Resp:  [17-18] 18  SpO2:  [97 %-100 %] 100 %  BP: (125-136)/(60-85) 135/61     Weight: 70.8 kg (156 lb)  Body mass index is 47.6 kg/m².     Physical Exam  Constitutional:       Appearance: She is ill-appearing (chronically).   HENT:      Head: Normocephalic and atraumatic.      Right Ear: External ear normal.      Left Ear: External ear normal.   Eyes:      Extraocular Movements: Extraocular movements intact.      Conjunctiva/sclera: Conjunctivae normal.      Pupils: Pupils are equal, round, and reactive to light.   Cardiovascular:      Rate and Rhythm: Normal rate and regular rhythm.   Pulmonary:      Effort: Pulmonary effort is normal. No respiratory distress.   Abdominal:      General: Abdomen is flat. Bowel sounds are normal.   Musculoskeletal:          General: Tenderness (LLE) and deformity (bilateral BKA) present.   Skin:     Findings: No lesion.          Neurological:      General: No focal deficit present.      Mental Status: She is alert and oriented to person, place, and time.   Psychiatric:         Mood and Affect: Mood normal.              CRANIAL NERVES     CN III, IV, VI   Pupils are equal, round, and reactive to light.       Significant Labs: All pertinent labs within the past 24 hours have been reviewed.    Significant Imaging: I have reviewed all pertinent imaging results/findings within the past 24 hours.  Assessment/Plan:     * Cellulitis of left thigh  Awaiting MR to evaluate for abscess v osteo  Cont vanc/cefepime  Surgery consulted, appreciate recommendations  Follow cultures    Severe obesity (BMI >= 40)  Body mass index is 47.6 kg/m².  Would benefit from dietary and lifestyle modifications in relation to health  Consider dietary/nutrition consult outpatient        ESRD (end stage renal disease)  Consult nephro for HD management  HD TThS    Paroxysmal atrial fibrillation  Cont eliquis    Anemia due to chronic kidney disease, on chronic dialysis  Close monitoring  No indication for transfusion at this time  Goal directed resuscitation, transfuse for Hgb<7    CAD (coronary artery disease)  Cont plavix, eliquis  Cont statin    Type 2 diabetes mellitus with hyperglycemia, with long-term current use of insulin  Slide  Diabetic diet with not NPO  ACHS glucose checks    CAROLIN (generalized anxiety disorder)  Cont zoloft    Peripheral vascular disease  Cont statin  Cont eliquis and plavix      VTE Risk Mitigation (From admission, onward)           Ordered     apixaban tablet 5 mg  2 times daily         11/15/24 1833     IP VTE HIGH RISK PATIENT  Once         11/15/24 1648     Place sequential compression device  Until discontinued         11/15/24 1648     Reason for No Pharmacological VTE Prophylaxis  Once        Question:  Reasons:  Answer:  Already  adequately anticoagulated on oral Anticoagulants    11/15/24 1648                       On 11/15/2024, patient should be placed in hospital observation services under my care.             Cody Traore DO  Department of Hospital Medicine  [facility] Surg

## 2024-11-17 LAB
ALBUMIN SERPL BCP-MCNC: 1.8 G/DL (ref 3.5–5.2)
ANION GAP SERPL CALC-SCNC: 12 MMOL/L (ref 8–16)
BUN SERPL-MCNC: 24 MG/DL (ref 6–20)
CALCIUM SERPL-MCNC: 8.3 MG/DL (ref 8.7–10.5)
CHLORIDE SERPL-SCNC: 96 MMOL/L (ref 95–110)
CO2 SERPL-SCNC: 23 MMOL/L (ref 23–29)
CREAT SERPL-MCNC: 3.1 MG/DL (ref 0.5–1.4)
CRP SERPL-MCNC: 50.1 MG/L (ref 0–8.2)
ERYTHROCYTE [DISTWIDTH] IN BLOOD BY AUTOMATED COUNT: 18.7 % (ref 11.5–14.5)
ERYTHROCYTE [SEDIMENTATION RATE] IN BLOOD BY PHOTOMETRIC METHOD: 63 MM/HR (ref 0–36)
EST. GFR  (NO RACE VARIABLE): 16.8 ML/MIN/1.73 M^2
GLUCOSE SERPL-MCNC: 150 MG/DL (ref 70–110)
HCT VFR BLD AUTO: 22.6 % (ref 37–48.5)
HGB BLD-MCNC: 7.1 G/DL (ref 12–16)
MAGNESIUM SERPL-MCNC: 1.8 MG/DL (ref 1.6–2.6)
MCH RBC QN AUTO: 31 PG (ref 27–31)
MCHC RBC AUTO-ENTMCNC: 31.4 G/DL (ref 32–36)
MCV RBC AUTO: 99 FL (ref 82–98)
PHOSPHATE SERPL-MCNC: 4.2 MG/DL (ref 2.7–4.5)
PLATELET # BLD AUTO: 373 K/UL (ref 150–450)
PMV BLD AUTO: 10.7 FL (ref 9.2–12.9)
POCT GLUCOSE: 145 MG/DL (ref 70–110)
POCT GLUCOSE: 146 MG/DL (ref 70–110)
POCT GLUCOSE: 155 MG/DL (ref 70–110)
POTASSIUM SERPL-SCNC: 4 MMOL/L (ref 3.5–5.1)
RBC # BLD AUTO: 2.29 M/UL (ref 4–5.4)
SODIUM SERPL-SCNC: 131 MMOL/L (ref 136–145)
WBC # BLD AUTO: 7.42 K/UL (ref 3.9–12.7)

## 2024-11-17 PROCEDURE — 25000003 PHARM REV CODE 250: Mod: HCNC

## 2024-11-17 PROCEDURE — 83735 ASSAY OF MAGNESIUM: CPT | Mod: HCNC

## 2024-11-17 PROCEDURE — 63600175 PHARM REV CODE 636 W HCPCS: Mod: HCNC

## 2024-11-17 PROCEDURE — 21400001 HC TELEMETRY ROOM: Mod: HCNC

## 2024-11-17 PROCEDURE — 85652 RBC SED RATE AUTOMATED: CPT | Mod: HCNC

## 2024-11-17 PROCEDURE — 80069 RENAL FUNCTION PANEL: CPT | Mod: HCNC

## 2024-11-17 PROCEDURE — 85027 COMPLETE CBC AUTOMATED: CPT | Mod: HCNC

## 2024-11-17 PROCEDURE — 36415 COLL VENOUS BLD VENIPUNCTURE: CPT | Mod: HCNC

## 2024-11-17 PROCEDURE — 86140 C-REACTIVE PROTEIN: CPT | Mod: HCNC

## 2024-11-17 RX ORDER — CEFEPIME HYDROCHLORIDE 1 G/1
1 INJECTION, POWDER, FOR SOLUTION INTRAMUSCULAR; INTRAVENOUS
Status: DISCONTINUED | OUTPATIENT
Start: 2024-11-17 | End: 2024-11-23

## 2024-11-17 RX ADMIN — APIXABAN 5 MG: 5 TABLET, FILM COATED ORAL at 08:11

## 2024-11-17 RX ADMIN — SERTRALINE HYDROCHLORIDE 100 MG: 50 TABLET ORAL at 08:11

## 2024-11-17 RX ADMIN — ISOSORBIDE DINITRATE 10 MG: 10 TABLET ORAL at 09:11

## 2024-11-17 RX ADMIN — HYDRALAZINE HYDROCHLORIDE 10 MG: 10 TABLET ORAL at 08:11

## 2024-11-17 RX ADMIN — SODIUM BICARBONATE 650 MG TABLET 650 MG: at 09:11

## 2024-11-17 RX ADMIN — CARVEDILOL 3.12 MG: 3.12 TABLET, FILM COATED ORAL at 08:11

## 2024-11-17 RX ADMIN — CARVEDILOL 3.12 MG: 3.12 TABLET, FILM COATED ORAL at 09:11

## 2024-11-17 RX ADMIN — APIXABAN 5 MG: 5 TABLET, FILM COATED ORAL at 09:11

## 2024-11-17 RX ADMIN — CALCITRIOL 0.5 MCG: 0.5 CAPSULE, LIQUID FILLED ORAL at 08:11

## 2024-11-17 RX ADMIN — HYDRALAZINE HYDROCHLORIDE 10 MG: 10 TABLET ORAL at 09:11

## 2024-11-17 RX ADMIN — PREGABALIN 75 MG: 75 CAPSULE ORAL at 05:11

## 2024-11-17 RX ADMIN — CEFEPIME 1 G: 1 INJECTION, POWDER, FOR SOLUTION INTRAMUSCULAR; INTRAVENOUS at 01:11

## 2024-11-17 RX ADMIN — ATORVASTATIN CALCIUM 80 MG: 40 TABLET, FILM COATED ORAL at 08:11

## 2024-11-17 RX ADMIN — CLOPIDOGREL BISULFATE 75 MG: 75 TABLET ORAL at 08:11

## 2024-11-17 RX ADMIN — SODIUM BICARBONATE 650 MG TABLET 650 MG: at 02:11

## 2024-11-17 RX ADMIN — ISOSORBIDE DINITRATE 10 MG: 10 TABLET ORAL at 08:11

## 2024-11-17 RX ADMIN — SODIUM BICARBONATE 650 MG TABLET 650 MG: at 08:11

## 2024-11-17 NOTE — ASSESSMENT & PLAN NOTE
MR non diagnostic due to motion artifact  Cont vanc/cefepime  Surgery consulted, appreciate recommendations  Follow cultures if available  Consult ID  Likely plan for repeat MR with sedation

## 2024-11-17 NOTE — PROGRESS NOTES
[facility] Medicine  Progress Note    Patient Name: Suyapa Connelly  MRN: [id number]  Patient Class: IP- Inpatient   Admission Date: 11/15/2024  Length of Stay: 1 days  Attending Physician: Cody Traore DO  Primary Care Provider: Vanessa Noel MD        Subjective:     Principal Problem:Cellulitis of left thigh        HPI:  58yo female whose PMH includes bilateral BKA, ESRD, DM, HTN, HLD presents with purulence from L stump wound.  She was discharged from this facility 11.12.24 with oral abx or LLE cellulitis after bring admitted for septic shock.  During that admission general surgery was consulted for further evaluation d/t concerns for nec fasc as there was subcutaneous emphysema noted on CT; after their clinical assessment nec fasc was ruled out.  She did receive IV abx during that admission and on discharge was transitioned to oral augmentin and doxy and she went to SNF.  She was sent back to the ED for continued purulence and LLE pain.  Surgery was consulted in the ED and have no plans for intervention at this time.  MR is pending.  White count is normal.  She has been admitted to medicine for further management.    Overview/Hospital Course:  Admitted for LLE cellulitis; failed outpatient oral abx.  Discharged from this facility 11.12.24 with oral abx or LLE cellulitis after bring admitted for septic shock; on discharge was transitioned to oral augmentin and doxy and she went to SNF.  Sent back to the ED for continued purulence and LLE pain.  Surgery was consulted in the ED and have no plans for intervention at this time.  Repeat CT here showed postop change of bilateral above knee amputation, no destructive osseous process to suggest osteomyelitis in the remaining portions of the left femur or in the visualized proximal portion of the cut right femur, skin defect along the medial aspect of the left thigh with foci of air underlying the wound, additional area of soft tissue contour  indentation and focal skin thickening along the anterior thigh, suggestive of possible infectious or inflammatory process versus region of scarring, additional extensive soft tissue edema and skin thickening of the pelvis, partially visualized right lower extremity, and left lower extremity.  MR non diagnostic due to motion artifact.  White count is normal.  Receiving vanc/cefepime.    Interval History:  Seen and evaluated on general medical floor  No acute events overnight    Clinical status improved  No new complaints    Objective:     Vital Signs (Most Recent):  Temp: 98.3 °F (36.8 °C) (11/17/24 1126)  Pulse: 83 (11/17/24 1126)  Resp: 16 (11/17/24 1126)  BP: (!) 152/70 (11/17/24 1126)  SpO2: 97 % (11/17/24 1126) Vital Signs (24h Range):  Temp:  [97.3 °F (36.3 °C)-98.5 °F (36.9 °C)] 98.3 °F (36.8 °C)  Pulse:  [] 83  Resp:  [16-18] 16  SpO2:  [95 %-99 %] 97 %  BP: (106-174)/(58-84) 152/70     Weight: 71.2 kg (156 lb 15.5 oz)  Body mass index is 47.9 kg/m².    Intake/Output Summary (Last 24 hours) at 11/17/2024 1149  Last data filed at 11/17/2024 0600  Gross per 24 hour   Intake 890 ml   Output 3150 ml   Net -2260 ml         Physical Exam  Constitutional:       Appearance: She is ill-appearing (chronically).   HENT:      Head: Normocephalic and atraumatic.      Right Ear: External ear normal.      Left Ear: External ear normal.   Eyes:      Extraocular Movements: Extraocular movements intact.      Conjunctiva/sclera: Conjunctivae normal.      Pupils: Pupils are equal, round, and reactive to light.   Cardiovascular:      Rate and Rhythm: Normal rate and regular rhythm.   Pulmonary:      Effort: Pulmonary effort is normal. No respiratory distress.   Abdominal:      General: Abdomen is flat. Bowel sounds are normal.   Musculoskeletal:         General: Tenderness (LLE) and deformity (bilateral BKA) present.   Skin:     Findings: No lesion.          Neurological:      General: No focal deficit present.      Mental  Status: She is alert and oriented to person, place, and time.   Psychiatric:         Mood and Affect: Mood normal.             Significant Labs: All pertinent labs within the past 24 hours have been reviewed.    Significant Imaging: I have reviewed all pertinent imaging results/findings within the past 24 hours.    Assessment/Plan:      * Cellulitis of left thigh  MR non diagnostic due to motion artifact  Cont vanc/cefepime  Surgery consulted, appreciate recommendations  Follow cultures if available  Consult ID  Likely plan for repeat MR with sedation    Severe obesity (BMI >= 40)  Body mass index is 47.6 kg/m².  Would benefit from dietary and lifestyle modifications in relation to health  Consider dietary/nutrition consult outpatient        ESRD (end stage renal disease)  Consult nephro for HD management  HD TThS    Paroxysmal atrial fibrillation  Cont eliquis    Anemia due to chronic kidney disease, on chronic dialysis  Close monitoring  No indication for transfusion at this time  Goal directed resuscitation, transfuse for Hgb<7    CAD (coronary artery disease)  Cont plavix, eliquis  Cont statin    Type 2 diabetes mellitus with hyperglycemia, with long-term current use of insulin  Slide  Diabetic diet when not NPO  ACHS glucose checks    CAROLIN (generalized anxiety disorder)  Cont zoloft    Peripheral vascular disease  Cont statin  Cont eliquis and plavix      VTE Risk Mitigation (From admission, onward)           Ordered     heparin (porcine) injection 1,000 Units  As needed (PRN)         11/16/24 1159     apixaban tablet 5 mg  2 times daily         11/15/24 1833     IP VTE HIGH RISK PATIENT  Once         11/15/24 1648     Place sequential compression device  Until discontinued         11/15/24 1648     Reason for No Pharmacological VTE Prophylaxis  Once        Question:  Reasons:  Answer:  Already adequately anticoagulated on oral Anticoagulants    11/15/24 1648                    Discharge Planning   DEVAN:  11/18/2024     Code Status: Full Code   Is the patient medically ready for discharge?:     Reason for patient still in hospital (select all that apply): Patient trending condition  Discharge Plan A: Return to nursing home                  Cody Traore DO  Department of Hospital Medicine   [facility] Surg

## 2024-11-17 NOTE — PLAN OF CARE
Patient is AAOx4 with periods of confusion. Vital signs stable. No falls throughout shift. No complaints of pain. HD today.  Problem: Adult Inpatient Plan of Care  Goal: Plan of Care Review  Outcome: Progressing  Goal: Patient-Specific Goal (Individualized)  Outcome: Progressing  Goal: Absence of Hospital-Acquired Illness or Injury  Outcome: Progressing  Goal: Optimal Comfort and Wellbeing  Outcome: Progressing  Goal: Readiness for Transition of Care  Outcome: Progressing     Problem: Bariatric Environmental Safety  Goal: Safety Maintained with Care  Outcome: Progressing     Problem: Infection  Goal: Absence of Infection Signs and Symptoms  Outcome: Progressing     Problem: Wound  Goal: Optimal Coping  Outcome: Progressing  Goal: Optimal Functional Ability  Outcome: Progressing  Goal: Absence of Infection Signs and Symptoms  Outcome: Progressing  Goal: Improved Oral Intake  Outcome: Progressing  Goal: Optimal Pain Control and Function  Outcome: Progressing  Goal: Skin Health and Integrity  Outcome: Progressing  Goal: Optimal Wound Healing  Outcome: Progressing     Problem: Sepsis/Septic Shock  Goal: Optimal Coping  Outcome: Progressing  Goal: Absence of Bleeding  Outcome: Progressing  Goal: Blood Glucose Level Within Targeted Range  Outcome: Progressing  Goal: Absence of Infection Signs and Symptoms  Outcome: Progressing  Goal: Optimal Nutrition Intake  Outcome: Progressing     Problem: Skin Injury Risk Increased  Goal: Skin Health and Integrity  Outcome: Progressing     Problem: Diabetes Comorbidity  Goal: Blood Glucose Level Within Targeted Range  Outcome: Progressing     Problem: Fall Injury Risk  Goal: Absence of Fall and Fall-Related Injury  Outcome: Progressing     Problem: Hemodialysis  Goal: Safe, Effective Therapy Delivery  Outcome: Progressing  Goal: Effective Tissue Perfusion  Outcome: Progressing  Goal: Absence of Infection Signs and Symptoms  Outcome: Progressing     Problem: Chronic Kidney  Disease  Goal: Electrolyte Balance  Outcome: Progressing  Goal: Fluid Balance  Outcome: Progressing

## 2024-11-17 NOTE — NURSING
Peripheral IV access attempted 2.  Attempt successful  No  Escalated to the Charge RN , IV Super-user     All of pts IV have been US guided.

## 2024-11-17 NOTE — SUBJECTIVE & OBJECTIVE
Interval History:  Seen and evaluated on general medical floor  No acute events overnight    Clinical status improved  No new complaints    Objective:     Vital Signs (Most Recent):  Temp: 98.3 °F (36.8 °C) (11/17/24 1126)  Pulse: 83 (11/17/24 1126)  Resp: 16 (11/17/24 1126)  BP: (!) 152/70 (11/17/24 1126)  SpO2: 97 % (11/17/24 1126) Vital Signs (24h Range):  Temp:  [97.3 °F (36.3 °C)-98.5 °F (36.9 °C)] 98.3 °F (36.8 °C)  Pulse:  [] 83  Resp:  [16-18] 16  SpO2:  [95 %-99 %] 97 %  BP: (106-174)/(58-84) 152/70     Weight: 71.2 kg (156 lb 15.5 oz)  Body mass index is 47.9 kg/m².    Intake/Output Summary (Last 24 hours) at 11/17/2024 1149  Last data filed at 11/17/2024 0600  Gross per 24 hour   Intake 890 ml   Output 3150 ml   Net -2260 ml         Physical Exam  Constitutional:       Appearance: She is ill-appearing (chronically).   HENT:      Head: Normocephalic and atraumatic.      Right Ear: External ear normal.      Left Ear: External ear normal.   Eyes:      Extraocular Movements: Extraocular movements intact.      Conjunctiva/sclera: Conjunctivae normal.      Pupils: Pupils are equal, round, and reactive to light.   Cardiovascular:      Rate and Rhythm: Normal rate and regular rhythm.   Pulmonary:      Effort: Pulmonary effort is normal. No respiratory distress.   Abdominal:      General: Abdomen is flat. Bowel sounds are normal.   Musculoskeletal:         General: Tenderness (LLE) and deformity (bilateral BKA) present.   Skin:     Findings: No lesion.          Neurological:      General: No focal deficit present.      Mental Status: She is alert and oriented to person, place, and time.   Psychiatric:         Mood and Affect: Mood normal.             Significant Labs: All pertinent labs within the past 24 hours have been reviewed.    Significant Imaging: I have reviewed all pertinent imaging results/findings within the past 24 hours.

## 2024-11-18 LAB
ALBUMIN SERPL BCP-MCNC: 1.7 G/DL (ref 3.5–5.2)
ANION GAP SERPL CALC-SCNC: 11 MMOL/L (ref 8–16)
BUN SERPL-MCNC: 34 MG/DL (ref 6–20)
BUN SERPL-MCNC: 34 MG/DL (ref 6–30)
CALCIUM SERPL-MCNC: 8.2 MG/DL (ref 8.7–10.5)
CHLORIDE SERPL-SCNC: 101 MMOL/L (ref 95–110)
CHLORIDE SERPL-SCNC: 96 MMOL/L (ref 95–110)
CO2 SERPL-SCNC: 22 MMOL/L (ref 23–29)
CREAT SERPL-MCNC: 4.2 MG/DL (ref 0.5–1.4)
CREAT SERPL-MCNC: 4.3 MG/DL (ref 0.5–1.4)
ERYTHROCYTE [DISTWIDTH] IN BLOOD BY AUTOMATED COUNT: 18.5 % (ref 11.5–14.5)
EST. GFR  (NO RACE VARIABLE): 11.7 ML/MIN/1.73 M^2
GLUCOSE SERPL-MCNC: 106 MG/DL (ref 70–110)
GLUCOSE SERPL-MCNC: 116 MG/DL (ref 70–110)
HCT VFR BLD AUTO: 22.3 % (ref 37–48.5)
HCT VFR BLD CALC: 17 %PCV (ref 36–54)
HGB BLD-MCNC: 7 G/DL (ref 12–16)
MAGNESIUM SERPL-MCNC: 1.8 MG/DL (ref 1.6–2.6)
MCH RBC QN AUTO: 31 PG (ref 27–31)
MCHC RBC AUTO-ENTMCNC: 31.4 G/DL (ref 32–36)
MCV RBC AUTO: 99 FL (ref 82–98)
PHOSPHATE SERPL-MCNC: 4.9 MG/DL (ref 2.7–4.5)
PLATELET # BLD AUTO: 340 K/UL (ref 150–450)
PMV BLD AUTO: 10.8 FL (ref 9.2–12.9)
POC IONIZED CALCIUM: 1.03 MMOL/L (ref 1.06–1.42)
POC TCO2 (MEASURED): 22 MMOL/L (ref 23–29)
POCT GLUCOSE: 113 MG/DL (ref 70–110)
POCT GLUCOSE: 113 MG/DL (ref 70–110)
POCT GLUCOSE: 137 MG/DL (ref 70–110)
POCT GLUCOSE: 162 MG/DL (ref 70–110)
POTASSIUM BLD-SCNC: 4.6 MMOL/L (ref 3.5–5.1)
POTASSIUM SERPL-SCNC: 4.1 MMOL/L (ref 3.5–5.1)
RBC # BLD AUTO: 2.26 M/UL (ref 4–5.4)
SAMPLE: ABNORMAL
SODIUM BLD-SCNC: 133 MMOL/L (ref 136–145)
SODIUM SERPL-SCNC: 129 MMOL/L (ref 136–145)
WBC # BLD AUTO: 6.59 K/UL (ref 3.9–12.7)

## 2024-11-18 PROCEDURE — 83735 ASSAY OF MAGNESIUM: CPT | Mod: HCNC

## 2024-11-18 PROCEDURE — 21400001 HC TELEMETRY ROOM: Mod: HCNC

## 2024-11-18 PROCEDURE — 99223 1ST HOSP IP/OBS HIGH 75: CPT | Mod: HCNC,GC,,

## 2024-11-18 PROCEDURE — 25000003 PHARM REV CODE 250: Mod: HCNC

## 2024-11-18 PROCEDURE — 36415 COLL VENOUS BLD VENIPUNCTURE: CPT | Mod: HCNC

## 2024-11-18 PROCEDURE — 99223 1ST HOSP IP/OBS HIGH 75: CPT | Mod: HCNC,,, | Performed by: INTERNAL MEDICINE

## 2024-11-18 PROCEDURE — 99232 SBSQ HOSP IP/OBS MODERATE 35: CPT | Mod: HCNC,,, | Performed by: NURSE PRACTITIONER

## 2024-11-18 PROCEDURE — 63600175 PHARM REV CODE 636 W HCPCS: Mod: HCNC

## 2024-11-18 PROCEDURE — 85027 COMPLETE CBC AUTOMATED: CPT | Mod: HCNC

## 2024-11-18 PROCEDURE — 80069 RENAL FUNCTION PANEL: CPT | Mod: HCNC

## 2024-11-18 PROCEDURE — 25000003 PHARM REV CODE 250: Mod: HCNC | Performed by: NURSE PRACTITIONER

## 2024-11-18 RX ORDER — SEVELAMER CARBONATE 800 MG/1
800 TABLET, FILM COATED ORAL
Status: DISCONTINUED | OUTPATIENT
Start: 2024-11-18 | End: 2024-11-29

## 2024-11-18 RX ORDER — SODIUM CHLORIDE 9 MG/ML
INJECTION, SOLUTION INTRAVENOUS ONCE
OUTPATIENT
Start: 2024-11-19

## 2024-11-18 RX ADMIN — APIXABAN 5 MG: 5 TABLET, FILM COATED ORAL at 08:11

## 2024-11-18 RX ADMIN — ISOSORBIDE DINITRATE 10 MG: 10 TABLET ORAL at 09:11

## 2024-11-18 RX ADMIN — CARVEDILOL 3.12 MG: 3.12 TABLET, FILM COATED ORAL at 09:11

## 2024-11-18 RX ADMIN — SEVELAMER CARBONATE 800 MG: 800 TABLET, FILM COATED ORAL at 12:11

## 2024-11-18 RX ADMIN — CLOPIDOGREL BISULFATE 75 MG: 75 TABLET ORAL at 09:11

## 2024-11-18 RX ADMIN — SODIUM BICARBONATE 650 MG TABLET 650 MG: at 09:11

## 2024-11-18 RX ADMIN — HYDRALAZINE HYDROCHLORIDE 10 MG: 10 TABLET ORAL at 09:11

## 2024-11-18 RX ADMIN — CARVEDILOL 3.12 MG: 3.12 TABLET, FILM COATED ORAL at 08:11

## 2024-11-18 RX ADMIN — SODIUM BICARBONATE 650 MG TABLET 650 MG: at 08:11

## 2024-11-18 RX ADMIN — SEVELAMER CARBONATE 800 MG: 800 TABLET, FILM COATED ORAL at 04:11

## 2024-11-18 RX ADMIN — CEFEPIME 1 G: 1 INJECTION, POWDER, FOR SOLUTION INTRAMUSCULAR; INTRAVENOUS at 12:11

## 2024-11-18 RX ADMIN — CINACALCET HYDROCHLORIDE 30 MG: 30 TABLET, FILM COATED ORAL at 04:11

## 2024-11-18 RX ADMIN — ISOSORBIDE DINITRATE 10 MG: 10 TABLET ORAL at 08:11

## 2024-11-18 RX ADMIN — OXYCODONE HYDROCHLORIDE AND ACETAMINOPHEN 1 TABLET: 5; 325 TABLET ORAL at 11:11

## 2024-11-18 RX ADMIN — PREGABALIN 75 MG: 75 CAPSULE ORAL at 04:11

## 2024-11-18 RX ADMIN — SERTRALINE HYDROCHLORIDE 100 MG: 50 TABLET ORAL at 09:11

## 2024-11-18 RX ADMIN — CALCITRIOL 0.5 MCG: 0.5 CAPSULE, LIQUID FILLED ORAL at 09:11

## 2024-11-18 RX ADMIN — HYDRALAZINE HYDROCHLORIDE 10 MG: 10 TABLET ORAL at 08:11

## 2024-11-18 RX ADMIN — ALLOPURINOL 50 MG: 300 TABLET ORAL at 09:11

## 2024-11-18 RX ADMIN — SODIUM BICARBONATE 650 MG TABLET 650 MG: at 02:11

## 2024-11-18 RX ADMIN — ATORVASTATIN CALCIUM 80 MG: 40 TABLET, FILM COATED ORAL at 09:11

## 2024-11-18 RX ADMIN — APIXABAN 5 MG: 5 TABLET, FILM COATED ORAL at 09:11

## 2024-11-18 NOTE — PROGRESS NOTES
[facility]  Nephrology  Progress Note    Patient Name: Suyapa Connelly  MRN: [id number]  Admission Date: 11/15/2024  Hospital Length of Stay: 2 days  Attending Provider: Cody Traore DO   Primary Care Physician: Vanessa Noel MD  Principal Problem:Cellulitis of left thigh    Subjective:     Interval History: NAEON.     Review of patient's allergies indicates:   Allergen Reactions    Ciprofloxacin Itching    Iodine      Kidney injury    Pcn [penicillins]      Rash; tolerated ceftriaxone on 1/13/20  Tolerating Augmentin November 2024     Current Facility-Administered Medications   Medication Frequency    acetaminophen tablet 1,000 mg Q8H PRN    allopurinol split tablet 50 mg Every other day    apixaban tablet 5 mg BID    atorvastatin tablet 80 mg Daily    calcitRIOL capsule 0.5 mcg Daily    carvediloL tablet 3.125 mg BID    ceFEPIme injection 1 g Q24H    cinacalcet tablet 30 mg Every Mon, Wed, Fri    clopidogreL tablet 75 mg Daily    dextrose 10% bolus 125 mL 125 mL PRN    dextrose 10% bolus 250 mL 250 mL PRN    epoetin nina-epbx injection 3,560 Units Every Tues, Thurs, Sat    glucagon (human recombinant) injection 1 mg PRN    glucose chewable tablet 16 g PRN    glucose chewable tablet 24 g PRN    heparin (porcine) injection 1,000 Units PRN    hydrALAZINE tablet 10 mg Q12H    insulin aspart U-100 pen 0-5 Units QID (AC + HS) PRN    isosorbide dinitrate tablet 10 mg BID    naloxone 0.4 mg/mL injection 0.02 mg PRN    oxyCODONE-acetaminophen 5-325 mg per tablet 1 tablet Q8H PRN    pregabalin capsule 75 mg Daily    sertraline tablet 100 mg Daily    sodium bicarbonate tablet 650 mg TID    sodium chloride 0.9% flush 10 mL Q12H PRN    vancomycin - pharmacy to dose pharmacy to manage frequency    vancomycin 1.75 g in 5 % dextrose 500 mL IVPB Once       Objective:     Vital Signs (Most Recent):  Temp: 97.6 °F (36.4 °C) (11/18/24 0753)  Pulse: 73 (11/18/24 0753)  Resp: 18 (11/18/24 0753)  BP: 131/69 (11/18/24  0753)  SpO2: 95 % (11/18/24 0753) Vital Signs (24h Range):  Temp:  [97.6 °F (36.4 °C)-98.6 °F (37 °C)] 97.6 °F (36.4 °C)  Pulse:  [73-87] 73  Resp:  [16-18] 18  SpO2:  [95 %-100 %] 95 %  BP: (131-169)/(67-84) 131/69     Weight: 71.2 kg (156 lb 15.5 oz) (11/16/24 0115)  Body mass index is 47.9 kg/m².  Body surface area is 1.55 meters squared.    I/O last 3 completed shifts:  In: 240 [P.O.:240]  Out: 0      Physical Exam  Vitals and nursing note reviewed.   Eyes:      Conjunctiva/sclera: Conjunctivae normal.   Pulmonary:      Effort: Pulmonary effort is normal.   Abdominal:      Palpations: Abdomen is soft.   Musculoskeletal:      Right lower leg: No edema.      Left lower leg: No edema.   Neurological:      Mental Status: She is alert and oriented to person, place, and time.          Significant Labs:  CMP:   Recent Labs   Lab 11/15/24  1533 11/16/24  0836 11/18/24  0446      < > 106   CALCIUM 9.0   < > 8.2*   ALBUMIN 2.0*   < > 1.7*   PROT 8.6*  --   --    *   < > 129*   K 4.8   < > 4.1   CO2 18*   < > 22*      < > 96   BUN 30*   < > 34*   CREATININE 3.7*   < > 4.2*   ALKPHOS 362*  --   --    ALT 19  --   --    AST 38  --   --    BILITOT 0.4  --   --     < > = values in this interval not displayed.     All labs within the past 24 hours have been reviewed.       Assessment/Plan:     Renal/  ESRD (end stage renal disease)  59 year old female hx of ESRD on HD TTS admitted for cellulitis left thigh. Last HD 11/14/24. Nephrology consulted for HD management.    Nephrology History  -Out[facility] HD [facility]  -Nephrologist: MD Kerry  -HD tx days: TTS  -HD tx time: ?  -Last HD tx: 11/14/24  -HD access: L TDC  -HD modality: iHD  -Residual urine: anuric  -EDW:  67-67.5 kg ?    Plan/Recommendations  ESRD on HD:  -Continue TTS schedule. UF goal 2-2.5 L as tolerated  -Seen on HD, tolerating tx well, no complaints  -Will continue iHD thrice weekly unless emergent indication arises  -Strict  I&Os  -Pre & post HD weight  Anemia in ESRD  -Hgb goal 10-11, hgb 7.9, resume OP Epo  -Iron c/I in the setting of infection  Mineral Bone Disease in ESRD  -Trend phos, uncertain if patient still on OP binders, resume if uncontrolled  -Continue OP sensipar and calcitriol  -sevelamer 800 mg PO TIDWM  -Renal diet if not NPO      ID  * Cellulitis of left thigh  -management per jax        Thank you for your consult. I will follow-up with patient. Please contact us if you have any additional questions.    Katie Monique, CHRIS  Nephrology  [facility] - Med Surg

## 2024-11-18 NOTE — HPI
60 y/o female PMHx: bilateral AKA, ESRD on HD, DM, HTN, HLD presents with purulence from L stump wound.  She was discharged from this facility 11/12/24 with oral abx for LLE cellulitis after bring admitted for septic shock.  During that admission there were concerns for nec fasc as there was subcutaneous emphysema noted on CT, however, nec fasc was ruled out.  She did receive IV abx during that admission and on dishcharge was transitioned to oral augmentin and doxy and she went to SNF.  She was sent back to the ED for continued purulence and LLE pain. She has been admitted to hospital medicine for further management.      -- ID will continue to follow for further recs

## 2024-11-18 NOTE — PROGRESS NOTES
[facility]  Progress Note    Patient Name: Suyapa Connelly  MRN: [id number]  Patient Class: IP- Inpatient   Admission Date: 11/15/2024  Length of Stay: 2 days  Attending Physician: Cody Traore DO  Primary Care Provider: Vanessa Noel MD        Subjective:     Principal Problem:Cellulitis of left thigh        HPI:  60yo female whose PMH includes bilateral BKA, ESRD, DM, HTN, HLD presents with purulence from L stump wound.  She was discharged from this facility 11.12.24 with oral abx or LLE cellulitis after bring admitted for septic shock.  During that admission general surgery was consulted for further evaluation d/t concerns for nec fasc as there was subcutaneous emphysema noted on CT; after their clinical assessment nec fasc was ruled out.  She did receive IV abx during that admission and on discharge was transitioned to oral augmentin and doxy and she went to SNF.  She was sent back to the ED for continued purulence and LLE pain.  Surgery was consulted in the ED and have no plans for intervention at this time.  MR is pending.  White count is normal.  She has been admitted to medicine for further management.    Overview/Hospital Course:  Admitted for LLE cellulitis; failed outpatient oral abx.  Discharged from this facility 11.12.24 with oral abx or LLE cellulitis after bring admitted for septic shock; on discharge was transitioned to oral augmentin and doxy and she went to SNF.  Sent back to the ED for continued purulence and LLE pain.  Surgery was consulted in the ED and have no plans for intervention at this time.  Repeat CT here showed postop change of bilateral above knee amputation, no destructive osseous process to suggest osteomyelitis in the remaining portions of the left femur or in the visualized proximal portion of the cut right femur, skin defect along the medial aspect of the left thigh with foci of air underlying the wound, additional area of soft tissue contour  indentation and focal skin thickening along the anterior thigh, suggestive of possible infectious or inflammatory process versus region of scarring, additional extensive soft tissue edema and skin thickening of the pelvis, partially visualized right lower extremity, and left lower extremity.  MR non diagnostic due to motion artifact.  White count is normal.  Receiving vanc/cefepime.    Interval History:  Seen and evaluated on general medical floor  No acute events overnight    Clinical status improved  No new complaints    Objective:     Vital Signs (Most Recent):  Temp: 97.6 °F (36.4 °C) (11/18/24 1118)  Pulse: 76 (11/18/24 1146)  Resp: 16 (11/18/24 1156)  BP: 139/74 (11/18/24 1118)  SpO2: 97 % (11/18/24 1118) Vital Signs (24h Range):  Temp:  [97.6 °F (36.4 °C)-98.6 °F (37 °C)] 97.6 °F (36.4 °C)  Pulse:  [73-87] 76  Resp:  [16-18] 16  SpO2:  [95 %-100 %] 97 %  BP: (131-169)/(67-84) 139/74     Weight: 71.2 kg (156 lb 15.5 oz)  Body mass index is 47.9 kg/m².  No intake or output data in the 24 hours ending 11/18/24 1309      Physical Exam  Constitutional:       Appearance: She is ill-appearing (chronically).   HENT:      Head: Normocephalic and atraumatic.      Right Ear: External ear normal.      Left Ear: External ear normal.   Eyes:      Extraocular Movements: Extraocular movements intact.      Conjunctiva/sclera: Conjunctivae normal.      Pupils: Pupils are equal, round, and reactive to light.   Cardiovascular:      Rate and Rhythm: Normal rate and regular rhythm.   Pulmonary:      Effort: Pulmonary effort is normal. No respiratory distress.   Abdominal:      General: Abdomen is flat. Bowel sounds are normal.   Musculoskeletal:         General: Tenderness (LLE) and deformity (bilateral BKA) present.   Skin:     Findings: No lesion.          Neurological:      General: No focal deficit present.      Mental Status: She is alert and oriented to person, place, and time.   Psychiatric:         Mood and Affect: Mood  normal.             Significant Labs: All pertinent labs within the past 24 hours have been reviewed.    Significant Imaging: I have reviewed all pertinent imaging results/findings within the past 24 hours.    Assessment/Plan:      * Cellulitis of left thigh  MR non diagnostic due to motion artifact  Cont vanc/cefepime  Surgery consulted in ED; no current plans for intervention  Follow cultures if available  Likely plan for repeat MR with sedation  ID consulted, appreciate recommendations    Severe obesity (BMI >= 40)  Body mass index is 47.6 kg/m².  Would benefit from dietary and lifestyle modifications in relation to health  Consider dietary/nutrition consult outpatient        ESRD (end stage renal disease)  Consult nephro for HD management  HD TThS    Paroxysmal atrial fibrillation  Cont eliquis    Anemia due to chronic kidney disease, on chronic dialysis  Close monitoring  No indication for transfusion at this time  Goal directed resuscitation, transfuse for Hgb<7    CAD (coronary artery disease)  Cont plavix, eliquis  Cont statin    Type 2 diabetes mellitus with hyperglycemia, with long-term current use of insulin  Slide  Diabetic diet when not NPO  ACHS glucose checks    CAROLIN (generalized anxiety disorder)  Cont zoloft    Peripheral vascular disease  Cont statin  Cont eliquis and plavix      VTE Risk Mitigation (From admission, onward)           Ordered     heparin (porcine) injection 1,000 Units  As needed (PRN)         11/16/24 1159     apixaban tablet 5 mg  2 times daily         11/15/24 1833     IP VTE HIGH RISK PATIENT  Once         11/15/24 1648     Place sequential compression device  Until discontinued         11/15/24 1648     Reason for No Pharmacological VTE Prophylaxis  Once        Question:  Reasons:  Answer:  Already adequately anticoagulated on oral Anticoagulants    11/15/24 1648                    Discharge Planning   DEVAN: 11/18/2024     Code Status: Full Code   Is the patient medically ready  for discharge?:     Reason for patient still in hospital (select all that apply): Patient trending condition  Discharge Plan A: Return to nursing home                  Cody Traore DO  Department of Hospital Medicine   [facility]

## 2024-11-18 NOTE — SUBJECTIVE & OBJECTIVE
Interval History: NAEON.     Review of patient's allergies indicates:   Allergen Reactions    Ciprofloxacin Itching    Iodine      Kidney injury    Pcn [penicillins]      Rash; tolerated ceftriaxone on 1/13/20  Tolerating Augmentin November 2024     Current Facility-Administered Medications   Medication Frequency    acetaminophen tablet 1,000 mg Q8H PRN    allopurinol split tablet 50 mg Every other day    apixaban tablet 5 mg BID    atorvastatin tablet 80 mg Daily    calcitRIOL capsule 0.5 mcg Daily    carvediloL tablet 3.125 mg BID    ceFEPIme injection 1 g Q24H    cinacalcet tablet 30 mg Every Mon, Wed, Fri    clopidogreL tablet 75 mg Daily    dextrose 10% bolus 125 mL 125 mL PRN    dextrose 10% bolus 250 mL 250 mL PRN    epoetin nina-epbx injection 3,560 Units Every Tues, Thurs, Sat    glucagon (human recombinant) injection 1 mg PRN    glucose chewable tablet 16 g PRN    glucose chewable tablet 24 g PRN    heparin (porcine) injection 1,000 Units PRN    hydrALAZINE tablet 10 mg Q12H    insulin aspart U-100 pen 0-5 Units QID (AC + HS) PRN    isosorbide dinitrate tablet 10 mg BID    naloxone 0.4 mg/mL injection 0.02 mg PRN    oxyCODONE-acetaminophen 5-325 mg per tablet 1 tablet Q8H PRN    pregabalin capsule 75 mg Daily    sertraline tablet 100 mg Daily    sodium bicarbonate tablet 650 mg TID    sodium chloride 0.9% flush 10 mL Q12H PRN    vancomycin - pharmacy to dose pharmacy to manage frequency    vancomycin 1.75 g in 5 % dextrose 500 mL IVPB Once       Objective:     Vital Signs (Most Recent):  Temp: 97.6 °F (36.4 °C) (11/18/24 0753)  Pulse: 73 (11/18/24 0753)  Resp: 18 (11/18/24 0753)  BP: 131/69 (11/18/24 0753)  SpO2: 95 % (11/18/24 0753) Vital Signs (24h Range):  Temp:  [97.6 °F (36.4 °C)-98.6 °F (37 °C)] 97.6 °F (36.4 °C)  Pulse:  [73-87] 73  Resp:  [16-18] 18  SpO2:  [95 %-100 %] 95 %  BP: (131-169)/(67-84) 131/69     Weight: 71.2 kg (156 lb 15.5 oz) (11/16/24 0115)  Body mass index is 47.9 kg/m².  Body  surface area is 1.55 meters squared.    I/O last 3 completed shifts:  In: 240 [P.O.:240]  Out: 0      Physical Exam  Vitals and nursing note reviewed.   Eyes:      Conjunctiva/sclera: Conjunctivae normal.   Pulmonary:      Effort: Pulmonary effort is normal.   Abdominal:      Palpations: Abdomen is soft.   Musculoskeletal:      Right lower leg: No edema.      Left lower leg: No edema.   Neurological:      Mental Status: She is alert and oriented to person, place, and time.          Significant Labs:  CMP:   Recent Labs   Lab 11/15/24  1533 11/16/24  0836 11/18/24  0446      < > 106   CALCIUM 9.0   < > 8.2*   ALBUMIN 2.0*   < > 1.7*   PROT 8.6*  --   --    *   < > 129*   K 4.8   < > 4.1   CO2 18*   < > 22*      < > 96   BUN 30*   < > 34*   CREATININE 3.7*   < > 4.2*   ALKPHOS 362*  --   --    ALT 19  --   --    AST 38  --   --    BILITOT 0.4  --   --     < > = values in this interval not displayed.     All labs within the past 24 hours have been reviewed.

## 2024-11-18 NOTE — ASSESSMENT & PLAN NOTE
"60 y/o female PMHx: bilateral BKA, ESRD on HD, DM, HTN, HLD presents with purulence from L stump wound. Recently admitted and received IV abx during that admission and on discharge was transitioned to oral augmentin and doxy and she went to SNF.  She was sent back to the ED on 11/15 for continued purulence and LLE pain.  Surgery was consulted and have no plans for intervention at this time. WBC WNL. She has been admitted to hospital medicine for further management. MRI results below. MRI femur results pending.     ID consulted for "antibiotic management"    Upon exam, pt tearful, poor historian, c/o pain to LLE      Results for orders placed or performed during the hospital encounter of 11/15/24 (from the past 2160 hours)   MRI Limited Non-Billable    Impression    1. Limitations related to incomplete examination and motion artifact.  2. Postop change of left above knee amputation.  3. Extensive soft tissue edema and skin thickening, suggestive of possible infectious or inflammatory process such as cellulitis.  4. Evaluation for osteomyelitis is extremely limited without definite areas of marrow infiltration.    Electronically signed by resident: Fely Saenz  Date:    11/16/2024  Time:    06:18    Electronically signed by: Huang Miller  Date:    11/16/2024  Time:    06:30     *Note: Due to a large number of results and/or encounters for the requested time period, some results have not been displayed. A complete set of results can be found in Results Review.     Recommendations / Plan  Continue vanc and Cefepime 1 g q24 hrs  Recommend wound care consult and appropriate wound care to LLE  Will f/u with MRI femur results     -- Discussed with ID staff Dr. Vidal and primary team  -- ID will continue to follow for further recs    "

## 2024-11-18 NOTE — SUBJECTIVE & OBJECTIVE
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    Osteomyelitis     Osteomyelitis of left foot 04/30/2021    Ulcer of left foot     Vaginal delivery     x1       Past Surgical History:   Procedure Laterality Date    ABOVE-KNEE AMPUTATION Left 5/18/2021    Procedure: AMPUTATION, ABOVE KNEE;  Surgeon: Teddy Huber MD;  Location: [facility] OR [address] [place] [address];  Service: Vascular;  Laterality: Left;    ABOVE-KNEE AMPUTATION Right 3/18/2022    Procedure: AMPUTATION, ABOVE KNEE;  Surgeon: DAYNE Florez II, MD;  Location: [facility] OR [address] [place] [address];  Service: Vascular;  Laterality: Right;    Angiogram - Right Extremity Right 7/9/15    angiogram-left leg  10/6/15    ANGIOGRAPHY OF LOWER EXTREMITY Left 4/29/2021    Procedure: ANGIOGRAM, LOWER EXTREMITY;  Surgeon: Teddy Huber MD;  Location: [facility] OR [address] [place] [address];  Service: Vascular;  Laterality: Left;    BELOW KNEE AMPUTATION OF LOWER EXTREMITY Right 12/28/2021    Procedure: AMPUTATION, BELOW KNEE;  Surgeon: Kaitlyn Rojas MD;  Location: [facility] OR;  Service: General;  Laterality: Right;    CATHETERIZATION OF BOTH LEFT AND RIGHT HEART N/A 12/18/2019    Procedure: CATHETERIZATION, HEART, BOTH LEFT AND RIGHT;  Surgeon: Que Fernando III, MD;  Location: [facility] CATH LAB;  Service: Cardiology;  Laterality: N/A;    CORONARY ANGIOGRAPHY N/A 12/18/2019    Procedure: ANGIOGRAM, CORONARY ARTERY;  Surgeon: Que Fernando III, MD;  Location: [facility] CATH LAB;  Service: Cardiology;  Laterality: N/A;    CORONARY ANGIOGRAPHY INCLUDING BYPASS GRAFTS WITH CATHETERIZATION OF LEFT HEART N/A 7/28/2020    Procedure: ANGIOGRAM, CORONARY, INCLUDING BYPASS GRAFT, WITH LEFT HEART CATHETERIZATION, 9 am;  Surgeon: Rachel Easley MD;   Location: [facility] CATH LAB;  Service: Cardiology;  Laterality: N/A;    CORONARY ARTERY BYPASS GRAFT (CABG) N/A 1/14/2020    Procedure: CORONARY ARTERY BYPASS GRAFT (CABG) x 1     Off Pump;  Surgeon: Huang Altamirano MD;  Location: [facility] OR [address] [place] [address];  Service: Cardiovascular;  Laterality: N/A;    CREATION OF FEMORAL-TIBIAL ARTERY BYPASS Left 4/29/2021    Procedure: CREATION, BYPASS, ARTERIAL, FEMORAL TO ANTERIOR TIBIAL;  Surgeon: Teddy Huber MD;  Location: [facility] OR [address] [place] [address];  Service: Vascular;  Laterality: Left;    CREATION OF FEMOROPOPLITEAL ARTERIAL BYPASS USING GRAFT Left 8/18/2020    Procedure: CREATION, BYPASS, ARTERIAL, FEMORAL TO POPLITEAL, USING GRAFT, LEFT LOWER EXTREMITY;  Surgeon: Teddy Huber MD;  Location: [facility];  Service: Vascular;  Laterality: Left;  REQUEST 7:15 A.M. START----COVID NEGATIVE ON 8/17 1ST CASE STARTE PER LEANA ON 8/7/2020 @ 942AM-  RN PREOP 8/12/2020   T/S-----CLEARED BY CARDS-------PENDING INSURANCE    DEBRIDEMENT OF FOOT Left 9/8/2020    Procedure: DEBRIDEMENT, FOOT;  Surgeon: Rosio Mayes DPM;  Location: [facility];  Service: Podiatry;  Laterality: Left;  request neoxx .   RN Pre Op 9-4-2020, Covid negative on 9/5/20. C A    DEBRIDEMENT OF FOOT  3/4/2021    Procedure: DEBRIDEMENT, FOOT;  Surgeon: Teddy Huber MD;  Location: [facility] OR;  Service: Vascular;;    DEBRIDEMENT OF FOOT Left 3/9/2021    Procedure: DEBRIDEMENT, FOOT, bone biopsy;  Surgeon: Rosio Mayes DPM;  Location: [facility] OR;  Service: Podiatry;  Laterality: Left;  Request neoxx---COVID IN AM  REQUESTING NOON START  RN Phone Pre op.On Blood thinners Plavix and Eliquis.  Covid am of surgery. C A    DEBRIDEMENT OF FOOT Left 5/4/2021    Procedure: DEBRIDEMENT, FOOT;  Surgeon: Farooq Morley DPM;  Location: [address];  Service: Podiatry;  Laterality: Left;    ESOPHAGOGASTRODUODENOSCOPY N/A 11/7/2024    Procedure: EGD (ESOPHAGOGASTRODUODENOSCOPY);  Surgeon: Yony Cancino MD;   Location: [facility] ENDO;  Service: Endoscopy;  Laterality: N/A;    INSERTION OF TUNNELED CENTRAL VENOUS HEMODIALYSIS CATHETER N/A 1/27/2020    Procedure: Insertion, Catheter, Central Venous, Hemodialysis;  Surgeon: ESTEBAN Gomez III, MD;  Location: [facility] CATH LAB;  Service: Peripheral Vascular;  Laterality: N/A;    INSERTION OF TUNNELED CENTRAL VENOUS HEMODIALYSIS CATHETER  5/10/2023    Procedure: Insertion, Catheter, Central Venous, Hemodialysis;  Surgeon: Romulo Queen MD;  Location: [facility] CATH LAB;  Service: Cardiology;;    PERCUTANEOUS TRANSLUMINAL ANGIOPLASTY N/A 3/4/2021    Procedure: PTA (ANGIOPLASTY, PERCUTANEOUS, TRANSLUMINAL);  Surgeon: Teddy Huber MD;  Location: [facility] OR;  Service: Vascular;  Laterality: N/A;    REMOVAL OF ARTERIOVENOUS GRAFT Left 5/27/2021    Procedure: REMOVAL, GRAFT, LEFT LOWER EXTREMITY, WOUND EXPLORATION;  Surgeon: Teddy Huber MD;  Location: [facility] 2ND FLR;  Service: Vascular;  Laterality: Left;    REMOVAL OF NAIL OF DIGIT Left 3/9/2021    Procedure: REMOVAL, NAIL, DIGIT;  Surgeon: Rosio Mayes DPM;  Location: [facility] OR;  Service: Podiatry;  Laterality: Left;    RIGHT HEART CATHETERIZATION Right 5/10/2023    Procedure: INSERTION, CATHETER, RIGHT HEART;  Surgeon: Romulo Queen MD;  Location: [facility] CATH LAB;  Service: Cardiology;  Laterality: Right;    THROMBECTOMY Left 3/4/2021    Procedure: THROMBECTOMY, LEFT LOWER EXTREMITY BYPASS GRAFT, ANGIOGRAM, POSSIBLE INTERVENTION, POSSIBLE LEFT LOWER EXTREMITY BYPASS;  Surgeon: Teddy Huber MD;  Location: [facility] OR;  Service: Vascular;  Laterality: Left;    THROMBECTOMY Left 4/29/2021    Procedure: GRAFT THROMBECTOMY, LEFT LOWER EXTREMITY;  Surgeon: Teddy Huber MD;  Location: [facility] OR 2ND FLR;  Service: Vascular;  Laterality: Left;  14.5 min  1179.85 mGy  341.01 Gycm2  240 ml dye    THROMBECTOMY  10/22/2021    Procedure: THROMBECTOMY;  Surgeon: Saad Arenas MD;  Location: [facility] CATH LAB/EP;  Service:  Cardiology;;       Review of patient's allergies indicates:   Allergen Reactions    Ciprofloxacin Itching    Iodine      Kidney injury    Pcn [penicillins]      Rash; tolerated ceftriaxone on 20  Tolerating Augmentin 2024       Medications:  Medications Prior to Admission   Medication Sig    acetaminophen (TYLENOL) 325 MG tablet Take 2 tablets (650 mg total) by mouth every 8 (eight) hours as needed for Pain.    allopurinoL (ZYLOPRIM) 100 MG tablet Take 0.5 tablets (50 mg total) by mouth every other day.    [] amoxicillin-clavulanate 500-125mg (AUGMENTIN) 500-125 mg Tab Take 1 tablet (500 mg total) by mouth 2 (two) times daily. for 5 days    apixaban (ELIQUIS) 5 mg Tab Take 1 tablet (5 mg total) by mouth 2 (two) times daily.    atorvastatin (LIPITOR) 80 MG tablet Take 1 tablet (80 mg total) by mouth once daily.    blood sugar diagnostic Strp 1 each by Misc.(Non-Drug; Combo Route) route 3 (three) times daily.    blood sugar diagnostic Strp Use to check blood glucose 2 (two) times a day.    blood-glucose meter Misc TEST TWICE DAILY    blood-glucose sensor (DEXCOM G7 SENSOR) Radha 1 each by Misc.(Non-Drug; Combo Route) route once daily.    calcitRIOL (ROCALTROL) 0.5 MCG Cap Take 1 capsule (0.5 mcg total) by mouth once daily.    carvediloL (COREG) 6.25 MG tablet Take 0.5 tablets (3.125 mg total) by mouth 2 (two) times daily.    cinacalcet (SENSIPAR) 30 MG Tab Take 1 tablet (30 mg total) by mouth every Mon, Wed, Fri.    clopidogreL (PLAVIX) 75 mg tablet Take 1 tablet (75 mg total) by mouth once daily.    [] doxycycline (VIBRA-TABS) 100 MG tablet Take 1 tablet (100 mg total) by mouth every 12 (twelve) hours. for 5 days    epoetin nina (PROCRIT) 4,000 unit/mL injection Inject 1 mL (4,000 Units total) into the skin every Mon, Wed, Fri.    famotidine (PEPCID) 20 MG tablet Take 1 tablet (20 mg total) by mouth once daily.    hydrALAZINE (APRESOLINE) 10 MG tablet Take 1 tablet (10 mg total) by mouth  "every 12 (twelve) hours.    insulin aspart U-100 (NOVOLOG) 100 unit/mL (3 mL) InPn pen Inject 0-10 Units into the skin before meals and at bedtime as needed (Hyperglycemia). **MODERATE CORRECTION DOSE**  Blood Glucose  mg/dL                  Pre-meal                2200  151-200                2 units                    1 unit  201-250                4 units                    2 units  251-300                6 units                    3 units  301-350                8 units                    4 units  >350                     10 units                  5 units  Administer subcutaneously if needed at times designated by monitoring  schedule.    insulin glargine U-100, Lantus, 100 unit/mL (3 mL) SubQ InPn pen Inject 5 Units into the skin every evening.    isosorbide dinitrate (ISORDIL) 10 MG tablet Take 1 tablet (10 mg total) by mouth 2 (two) times daily.    Lactobacillus rhamnosus GG (CULTURELLE) 10 billion cell capsule Take 1 capsule by mouth once daily.    lancets 33 gauge Misc TEST 3 TIMES DAILY    oxyCODONE-acetaminophen (PERCOCET) 5-325 mg per tablet Take 1 tablet by mouth every 8 (eight) hours as needed for Pain.    pen needle, diabetic 32 gauge x 5/32" Ndle 1 Units by Misc.(Non-Drug; Combo Route) route before meals as needed (SSI).    pen needle, diabetic 33 gauge x 5/32" Ndle 1 each by Misc.(Non-Drug; Combo Route) route 3 (three) times daily.    pregabalin (LYRICA) 75 MG capsule Take 1 capsule (75 mg total) by mouth Daily.    sertraline (ZOLOFT) 100 MG tablet Take 1 tablet (100 mg total) by mouth once daily.    sodium bicarbonate 650 MG tablet Take 1 tablet (650 mg total) by mouth 3 (three) times daily.    vitamin renal formula, B-complex-vitamin c-folic acid, (NEPHROCAP) 1 mg Cap Take 1 capsule by mouth once daily.     Antibiotics (From admission, onward)      Start     Stop Route Frequency Ordered    11/17/24 1300  ceFEPIme injection 1 g         -- IV Every 24 hours (non-standard times) 11/17/24 1146    " "11/16/24 0903  vancomycin - pharmacy to dose  (vancomycin IVPB (PEDS and ADULTS))        Placed in "And" Linked Group    -- IV pharmacy to manage frequency 11/16/24 0804    11/15/24 1530  vancomycin 1.75 g in 5 % dextrose 500 mL IVPB  (Sepsis Workup)         -- IV Once 11/15/24 1522    11/15/24 1530  ceFEPIme injection 1 g  (Sepsis Workup)         11/16/24 0329 IV ED 1 Time 11/15/24 1522          Antifungals (From admission, onward)      None          Antivirals (From admission, onward)      None             Immunization History   Administered Date(s) Administered    COVID-19, MRNA, LN-S, PF (Pfizer) (Purple Cap) 03/09/2021, 03/30/2021    Hepatitis B (recombinant) Adjuvanted, 2 dose 01/12/2024, 06/17/2024, 06/19/2024    Hepatitis B, Adult 06/02/2023, 07/04/2023, 08/04/2023, 01/04/2024    Influenza (FLUBLOK) - Quadrivalent - Recombinant - PF *Preferred* (egg allergy) 03/27/2024    PPD Test 05/14/2024, 11/07/2024    Pneumococcal Conjugate - 20 Valent 10/15/2024       Family History       Problem Relation (Age of Onset)    Diabetes Mother, Father, Paternal Grandmother    Heart disease Maternal Grandmother    No Known Problems Maternal Grandfather, Paternal Grandfather          Social History     Socioeconomic History    Marital status:    Occupational History    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      Social Drivers of Health     Financial Resource Strain: Low Risk  (11/16/2024)    Overall Financial Resource Strain (CARDIA)     Difficulty of Paying Living Expenses: Not hard at all   Recent Concern: Financial Resource Strain - Medium Risk (10/29/2024)    Overall Financial Resource Strain (CARDIA)     Difficulty of Paying Living Expenses: Somewhat " hard   Food Insecurity: No Food Insecurity (11/16/2024)    Hunger Vital Sign     Worried About Running Out of Food in the Last Year: Never true     Ran Out of Food in the Last Year: Never true   Transportation Needs: No Transportation Needs (11/16/2024)    TRANSPORTATION NEEDS     Transportation : No   Physical Activity: Inactive (11/14/2024)    Exercise Vital Sign     Days of Exercise per Week: 0 days     Minutes of Exercise per Session: 0 min   Stress: No Stress Concern Present (11/16/2024)    [place] [place] [employer] - Occupational Stress Questionnaire     Feeling of Stress : Not at all   Recent Concern: Stress - Stress Concern Present (10/29/2024)    [place] [place] [employer] - Occupational Stress Questionnaire     Feeling of Stress : To some extent   Housing Stability: Low Risk  (11/16/2024)    Housing Stability Vital Sign     Unable to Pay for Housing in the Last Year: No     Homeless in the Last Year: No     Review of Systems   Constitutional:  Negative for chills, fatigue and fever.   Respiratory:  Negative for shortness of breath and wheezing.    Cardiovascular:  Negative for chest pain.   Gastrointestinal:  Negative for abdominal pain, constipation, diarrhea, nausea and vomiting.   Genitourinary:  Negative for difficulty urinating, dysuria, frequency, urgency, vaginal discharge and vaginal pain.   Musculoskeletal:  Negative for back pain.   Skin:  Positive for wound.   Neurological:  Positive for weakness.   Psychiatric/Behavioral:  Positive for confusion.      Objective:     Vital Signs (Most Recent):  Temp: 97.6 °F (36.4 °C) (11/18/24 1118)  Pulse: 76 (11/18/24 1146)  Resp: 16 (11/18/24 1156)  BP: 139/74 (11/18/24 1118)  SpO2: 97 % (11/18/24 1118) Vital Signs (24h Range):  Temp:  [97.6 °F (36.4 °C)-98.6 °F (37 °C)] 97.6 °F (36.4 °C)  Pulse:  [73-87] 76  Resp:  [16-18] 16  SpO2:  [95 %-100 %] 97 %  BP: (131-169)/(67-84) 139/74     Weight: 71.2 kg (156 lb 15.5 oz)  Body mass  index is 47.9 kg/m².    Estimated Creatinine Clearance: 11 mL/min (A) (based on SCr of 4.2 mg/dL (H)).     Physical Exam  Vitals and nursing note reviewed.   Constitutional:       General: She is not in acute distress.     Appearance: She is ill-appearing.       HENT:      Head: Normocephalic and atraumatic.      Nose: Nose normal.   Cardiovascular:      Rate and Rhythm: Normal rate and regular rhythm.      Heart sounds: No murmur heard.  Pulmonary:      Effort: Pulmonary effort is normal. No respiratory distress.      Breath sounds: No wheezing.   Abdominal:      General: There is no distension.   Skin:     Findings: No lesion or rash.   Neurological:      Mental Status: She is alert and oriented to person, place, and time.   Psychiatric:         Mood and Affect: Mood normal.          Significant Labs: Blood Culture:   Recent Labs   Lab 06/12/24  0949 06/12/24  0954 10/29/24  1045 11/15/24  1543   LABBLOO No growth after 5 days. No growth after 5 days. No growth after 5 days.  No growth after 5 days. No Growth to date  No Growth to date  No Growth to date  No Growth to date  No Growth to date  No Growth to date     All pertinent labs within the past 24 hours have been reviewed.    Significant Imaging: I have reviewed all pertinent imaging results/findings within the past 24 hours.

## 2024-11-18 NOTE — PLAN OF CARE

## 2024-11-18 NOTE — PLAN OF CARE
Problem: Adult Inpatient Plan of Care  Goal: Plan of Care Review  Outcome: Progressing  Goal: Patient-Specific Goal (Individualized)  Outcome: Progressing  Goal: Absence of Hospital-Acquired Illness or Injury  Outcome: Progressing  Goal: Optimal Comfort and Wellbeing  Outcome: Progressing  Goal: Readiness for Transition of Care  Outcome: Progressing     Problem: Bariatric Environmental Safety  Goal: Safety Maintained with Care  Outcome: Progressing     Problem: Infection  Goal: Absence of Infection Signs and Symptoms  Outcome: Progressing     Problem: Wound  Goal: Optimal Coping  Outcome: Progressing  Goal: Optimal Functional Ability  Outcome: Progressing  Goal: Absence of Infection Signs and Symptoms  Outcome: Progressing  Goal: Improved Oral Intake  Outcome: Progressing  Goal: Optimal Pain Control and Function  Outcome: Progressing  Goal: Skin Health and Integrity  Outcome: Progressing  Goal: Optimal Wound Healing  Outcome: Progressing     Problem: Sepsis/Septic Shock  Goal: Optimal Coping  Outcome: Progressing  Goal: Absence of Bleeding  Outcome: Progressing  Goal: Blood Glucose Level Within Targeted Range  Outcome: Progressing  Goal: Absence of Infection Signs and Symptoms  Outcome: Progressing  Goal: Optimal Nutrition Intake  Outcome: Progressing     Problem: Skin Injury Risk Increased  Goal: Skin Health and Integrity  Outcome: Progressing     Problem: Diabetes Comorbidity  Goal: Blood Glucose Level Within Targeted Range  Outcome: Progressing     Problem: Fall Injury Risk  Goal: Absence of Fall and Fall-Related Injury  Outcome: Progressing     Problem: Hemodialysis  Goal: Safe, Effective Therapy Delivery  Outcome: Progressing  Goal: Effective Tissue Perfusion  Outcome: Progressing  Goal: Absence of Infection Signs and Symptoms  Outcome: Progressing     Problem: Chronic Kidney Disease  Goal: Electrolyte Balance  Outcome: Progressing  Goal: Fluid Balance  Outcome: Progressing      Brandon Siddiqi(Attending)

## 2024-11-18 NOTE — ASSESSMENT & PLAN NOTE
MR non diagnostic due to motion artifact  Cont vanc/cefepime  Surgery consulted in ED; no current plans for intervention  Follow cultures if available  Likely plan for repeat MR with sedation  ID consulted, appreciate recommendations

## 2024-11-18 NOTE — PROGRESS NOTES
[facility] Medicine  Progress Note    Patient Name: Suyapa Connelly  MRN: [id number]  Patient Class: IP- Inpatient   Admission Date: 11/15/2024  Length of Stay: 2 days  Attending Physician: Cody Traore DO  Primary Care Provider: Vanessa Noel MD        Subjective:     Principal Problem:Cellulitis of left thigh        HPI:  60yo female whose PMH includes bilateral BKA, ESRD, DM, HTN, HLD presents with purulence from L stump wound.  She was discharged from this facility 11.12.24 with oral abx or LLE cellulitis after bring admitted for septic shock.  During that admission general surgery was consulted for further evaluation d/t concerns for nec fasc as there was subcutaneous emphysema noted on CT; after their clinical assessment nec fasc was ruled out.  She did receive IV abx during that admission and on discharge was transitioned to oral augmentin and doxy and she went to SNF.  She was sent back to the ED for continued purulence and LLE pain.  Surgery was consulted in the ED and have no plans for intervention at this time.  MR is pending.  White count is normal.  She has been admitted to medicine for further management.    Overview/Hospital Course:  Admitted for LLE cellulitis; failed outpatient oral abx.  Discharged from this facility 11.12.24 with oral abx or LLE cellulitis after bring admitted for septic shock; on discharge was transitioned to oral augmentin and doxy and she went to SNF.  Sent back to the ED for continued purulence and LLE pain.  Surgery was consulted in the ED and have no plans for intervention at this time.  Repeat CT here showed postop change of bilateral above knee amputation, no destructive osseous process to suggest osteomyelitis in the remaining portions of the left femur or in the visualized proximal portion of the cut right femur, skin defect along the medial aspect of the left thigh with foci of air underlying the wound, additional area of soft tissue contour  indentation and focal skin thickening along the anterior thigh, suggestive of possible infectious or inflammatory process versus region of scarring, additional extensive soft tissue edema and skin thickening of the pelvis, partially visualized right lower extremity, and left lower extremity.  MR non diagnostic due to motion artifact.  White count is normal.  Receiving vanc/cefepime.    No new subjective & objective note has been filed under this hospital service since the last note was generated.      Assessment/Plan:      * Cellulitis of left thigh  MR non diagnostic due to motion artifact  Cont vanc/cefepime  Surgery consulted in ED; no current plans for intervention  Follow cultures if available  Likely plan for repeat MR with sedation  ID consulted, appreciate recommendations    Severe obesity (BMI >= 40)  Body mass index is 47.6 kg/m².  Would benefit from dietary and lifestyle modifications in relation to health  Consider dietary/nutrition consult outpatient        ESRD (end stage renal disease)  Consult nephro for HD management  HD TThS    Paroxysmal atrial fibrillation  Cont eliquis    Anemia due to chronic kidney disease, on chronic dialysis  Close monitoring  No indication for transfusion at this time  Goal directed resuscitation, transfuse for Hgb<7    CAD (coronary artery disease)  Cont plavix, eliquis  Cont statin    Type 2 diabetes mellitus with hyperglycemia, with long-term current use of insulin  Slide  Diabetic diet when not NPO  ACHS glucose checks    CAROLIN (generalized anxiety disorder)  Cont zoloft    Peripheral vascular disease  Cont statin  Cont eliquis and plavix      VTE Risk Mitigation (From admission, onward)           Ordered     heparin (porcine) injection 1,000 Units  As needed (PRN)         11/16/24 1159     apixaban tablet 5 mg  2 times daily         11/15/24 1833     IP VTE HIGH RISK PATIENT  Once         11/15/24 1648     Place sequential compression device  Until discontinued          11/15/24 1648     Reason for No Pharmacological VTE Prophylaxis  Once        Question:  Reasons:  Answer:  Already adequately anticoagulated on oral Anticoagulants    11/15/24 1648                    Discharge Planning   DEVAN: 11/18/2024     Code Status: Full Code   Is the patient medically ready for discharge?:     Reason for patient still in hospital (select all that apply): Patient trending condition  Discharge Plan A: Return to nursing home                  Cody Traore DO  Department of Hospital Medicine   [facility]

## 2024-11-18 NOTE — CONSULTS
"[facility] Surg  Infectious Disease  Consult Note    Patient Name: Suyapa Connelly  MRN: [id number]  Admission Date: 11/15/2024  Hospital Length of Stay: 2 days  Attending Physician: Cody Traore DO  Primary Care Provider: Vanessa Noel MD     Isolation Status: No active isolations    Patient information was obtained from patient, EMS personnel, and ER records.      Inpatient consult to Infectious Diseases  Consult performed by: Yun Luke FNP  Consult ordered by: Cody Traore DO        Assessment/Plan:     ID  * Cellulitis of left thigh  58 y/o female PMHx: bilateral BKA, ESRD on HD, DM, HTN, HLD presents with purulence from L stump wound. Recently admitted and received IV abx during that admission and on discharge was transitioned to oral augmentin and doxy and she went to SNF.  She was sent back to the ED on 11/15 for continued purulence and LLE pain.  Surgery was consulted and have no plans for intervention at this time. WBC WNL. She has been admitted to hospital medicine for further management. MRI results below. MRI femur results pending.     ID consulted for "antibiotic management"    Upon exam, pt tearful, poor historian, c/o pain to LLE      Results for orders placed or performed during the hospital encounter of 11/15/24 (from the past 2160 hours)   MRI Limited Non-Billable    Impression    1. Limitations related to incomplete examination and motion artifact.  2. Postop change of left above knee amputation.  3. Extensive soft tissue edema and skin thickening, suggestive of possible infectious or inflammatory process such as cellulitis.  4. Evaluation for osteomyelitis is extremely limited without definite areas of marrow infiltration.    Electronically signed by resident: Fely Saenz  Date:    11/16/2024  Time:    06:18    Electronically signed by: Huang Miller  Date:    11/16/2024  Time:    06:30     *Note: Due to a large number of results and/or encounters for the requested " time period, some results have not been displayed. A complete set of results can be found in Results Review.     Recommendations / Plan  Continue vanc and Cefepime 1 g q24 hrs  Recommend wound care  Will f/u with MRI femur results     -- Discussed with ID staff Dr. Vidal and primary team  -- ID will continue to follow for further recs          Thank you for your consult. I will follow-up with patient. Please contact us if you have any additional questions.    Yun Luke, KODAKP  Infectious Disease  [facility] - Med Surg    Subjective:     Principal Problem: Cellulitis of left thigh    HPI: 60 y/o female PMHx: bilateral BKA, ESRD on HD, DM, HTN, HLD presents with purulence from L stump wound.  She was discharged from this facility 11/12/24 with oral abx for LLE cellulitis after bring admitted for septic shock.  During that admission there were concerns for nec fasc as there was subcutaneous emphysema noted on CT, however, nec fasc was ruled out.  She did receive IV abx during that admission and on dishcharge was transitioned to oral augmentin and doxy and she went to SNF.  She was sent back to the ED for continued purulence and LLE pain.  Surgery was consulted and have no plans for intervention at this time. WBC WNL. She has been admitted to hospital medicine for further management. MRI results below. MRI femur results pending      -- ID will continue to follow for further recs      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    Osteomyelitis     Osteomyelitis of left foot 04/30/2021    Ulcer of left foot     Vaginal delivery     x1       Past Surgical History:   Procedure Laterality Date    ABOVE-KNEE AMPUTATION Left 5/18/2021    Procedure:  AMPUTATION, ABOVE KNEE;  Surgeon: Teddy Huber MD;  Location: [address];  Service: Vascular;  Laterality: Left;    ABOVE-KNEE AMPUTATION Right 3/18/2022    Procedure: AMPUTATION, ABOVE KNEE;  Surgeon: DAYNE Florez II, MD;  Location: [facility] OR [address] [place] [address];  Service: Vascular;  Laterality: Right;    Angiogram - Right Extremity Right 7/9/15    angiogram-left leg  10/6/15    ANGIOGRAPHY OF LOWER EXTREMITY Left 4/29/2021    Procedure: ANGIOGRAM, LOWER EXTREMITY;  Surgeon: Teddy Huber MD;  Location: [address];  Service: Vascular;  Laterality: Left;    BELOW KNEE AMPUTATION OF LOWER EXTREMITY Right 12/28/2021    Procedure: AMPUTATION, BELOW KNEE;  Surgeon: Kaitlyn Rojas MD;  Location: [facility];  Service: General;  Laterality: Right;    CATHETERIZATION OF BOTH LEFT AND RIGHT HEART N/A 12/18/2019    Procedure: CATHETERIZATION, HEART, BOTH LEFT AND RIGHT;  Surgeon: Que Fernando III, MD;  Location: [facility] CATH LAB;  Service: Cardiology;  Laterality: N/A;    CORONARY ANGIOGRAPHY N/A 12/18/2019    Procedure: ANGIOGRAM, CORONARY ARTERY;  Surgeon: Que Fernando III, MD;  Location: [facility] CATH LAB;  Service: Cardiology;  Laterality: N/A;    CORONARY ANGIOGRAPHY INCLUDING BYPASS GRAFTS WITH CATHETERIZATION OF LEFT HEART N/A 7/28/2020    Procedure: ANGIOGRAM, CORONARY, INCLUDING BYPASS GRAFT, WITH LEFT HEART CATHETERIZATION, 9 am;  Surgeon: Rachel Easley MD;  Location: [facility] CATH LAB;  Service: Cardiology;  Laterality: N/A;    CORONARY ARTERY BYPASS GRAFT (CABG) N/A 1/14/2020    Procedure: CORONARY ARTERY BYPASS GRAFT (CABG) x 1     Off Pump;  Surgeon: Huang Altamirano MD;  Location: [address];  Service: Cardiovascular;  Laterality: N/A;    CREATION OF FEMORAL-TIBIAL ARTERY BYPASS Left 4/29/2021    Procedure: CREATION, BYPASS, ARTERIAL, FEMORAL TO ANTERIOR TIBIAL;  Surgeon: Teddy Huber MD;  Location: [address];  Service: Vascular;  Laterality: Left;    CREATION OF  FEMOROPOPLITEAL ARTERIAL BYPASS USING GRAFT Left 8/18/2020    Procedure: CREATION, BYPASS, ARTERIAL, FEMORAL TO POPLITEAL, USING GRAFT, LEFT LOWER EXTREMITY;  Surgeon: Teddy Huber MD;  Location: [facility] OR;  Service: Vascular;  Laterality: Left;  REQUEST 7:15 A.M. START----COVID NEGATIVE ON 8/17  1ST CASE STARTE PER LEANA ON 8/7/2020 @ 942AM-LO  RN PREOP 8/12/2020   T/S-----CLEARED BY CARDS-------PENDING INSURANCE    DEBRIDEMENT OF FOOT Left 9/8/2020    Procedure: DEBRIDEMENT, FOOT;  Surgeon: Rosio Mayes DPM;  Location: [facility] OR;  Service: Podiatry;  Laterality: Left;  request neoxx .   RN Pre Op 9-4-2020, Covid negative on 9/5/20. C A    DEBRIDEMENT OF FOOT  3/4/2021    Procedure: DEBRIDEMENT, FOOT;  Surgeon: Teddy Huber MD;  Location: [facility];  Service: Vascular;;    DEBRIDEMENT OF FOOT Left 3/9/2021    Procedure: DEBRIDEMENT, FOOT, bone biopsy;  Surgeon: Rosio Mayes DPM;  Location: [facility] OR;  Service: Podiatry;  Laterality: Left;  Request neoxx---COVID IN AM  REQUESTING NOON START  RN Phone Pre op.On Blood thinners Plavix and Eliquis.  Covid am of surgery. C A    DEBRIDEMENT OF FOOT Left 5/4/2021    Procedure: DEBRIDEMENT, FOOT;  Surgeon: Farooq Morley DPM;  Location: [address];  Service: Podiatry;  Laterality: Left;    ESOPHAGOGASTRODUODENOSCOPY N/A 11/7/2024    Procedure: EGD (ESOPHAGOGASTRODUODENOSCOPY);  Surgeon: Yony Cancino MD;  Location: [facility] ENDO;  Service: Endoscopy;  Laterality: N/A;    INSERTION OF TUNNELED CENTRAL VENOUS HEMODIALYSIS CATHETER N/A 1/27/2020    Procedure: Insertion, Catheter, Central Venous, Hemodialysis;  Surgeon: ESTEBAN Gomez III, MD;  Location: [facility] CATH LAB;  Service: Peripheral Vascular;  Laterality: N/A;    INSERTION OF TUNNELED CENTRAL VENOUS HEMODIALYSIS CATHETER  5/10/2023    Procedure: Insertion, Catheter, Central Venous, Hemodialysis;  Surgeon: Romulo Queen MD;  Location: [facility] CATH LAB;  Service: Cardiology;;     PERCUTANEOUS TRANSLUMINAL ANGIOPLASTY N/A 3/4/2021    Procedure: PTA (ANGIOPLASTY, PERCUTANEOUS, TRANSLUMINAL);  Surgeon: Teddy Huber MD;  Location: [facility] OR;  Service: Vascular;  Laterality: N/A;    REMOVAL OF ARTERIOVENOUS GRAFT Left 2021    Procedure: REMOVAL, GRAFT, LEFT LOWER EXTREMITY, WOUND EXPLORATION;  Surgeon: Teddy Huber MD;  Location: [facility] OR 2ND FLR;  Service: Vascular;  Laterality: Left;    REMOVAL OF NAIL OF DIGIT Left 3/9/2021    Procedure: REMOVAL, NAIL, DIGIT;  Surgeon: Rosio Mayes DPM;  Location: [facility] OR;  Service: Podiatry;  Laterality: Left;    RIGHT HEART CATHETERIZATION Right 5/10/2023    Procedure: INSERTION, CATHETER, RIGHT HEART;  Surgeon: Romulo Queen MD;  Location: [facility] CATH LAB;  Service: Cardiology;  Laterality: Right;    THROMBECTOMY Left 3/4/2021    Procedure: THROMBECTOMY, LEFT LOWER EXTREMITY BYPASS GRAFT, ANGIOGRAM, POSSIBLE INTERVENTION, POSSIBLE LEFT LOWER EXTREMITY BYPASS;  Surgeon: Teddy Huber MD;  Location: [facility] OR;  Service: Vascular;  Laterality: Left;    THROMBECTOMY Left 2021    Procedure: GRAFT THROMBECTOMY, LEFT LOWER EXTREMITY;  Surgeon: Teddy Huber MD;  Location: [facility] OR [facility] FLR;  Service: Vascular;  Laterality: Left;  14.5 min  1179.85 mGy  341.01 Gycm2  240 ml dye    THROMBECTOMY  10/22/2021    Procedure: THROMBECTOMY;  Surgeon: Saad Arenas MD;  Location: [facility] CATH LAB/EP;  Service: Cardiology;;       Review of patient's allergies indicates:   Allergen Reactions    Ciprofloxacin Itching    Iodine      Kidney injury    Pcn [penicillins]      Rash; tolerated ceftriaxone on 20  Tolerating Augmentin 2024       Medications:  Medications Prior to Admission   Medication Sig    acetaminophen (TYLENOL) 325 MG tablet Take 2 tablets (650 mg total) by mouth every 8 (eight) hours as needed for Pain.    allopurinoL (ZYLOPRIM) 100 MG tablet Take 0.5 tablets (50 mg total) by mouth every other day.    []  amoxicillin-clavulanate 500-125mg (AUGMENTIN) 500-125 mg Tab Take 1 tablet (500 mg total) by mouth 2 (two) times daily. for 5 days    apixaban (ELIQUIS) 5 mg Tab Take 1 tablet (5 mg total) by mouth 2 (two) times daily.    atorvastatin (LIPITOR) 80 MG tablet Take 1 tablet (80 mg total) by mouth once daily.    blood sugar diagnostic Strp 1 each by Misc.(Non-Drug; Combo Route) route 3 (three) times daily.    blood sugar diagnostic Strp Use to check blood glucose 2 (two) times a day.    blood-glucose meter Misc TEST TWICE DAILY    blood-glucose sensor (DEXCOM G7 SENSOR) Radha 1 each by Misc.(Non-Drug; Combo Route) route once daily.    calcitRIOL (ROCALTROL) 0.5 MCG Cap Take 1 capsule (0.5 mcg total) by mouth once daily.    carvediloL (COREG) 6.25 MG tablet Take 0.5 tablets (3.125 mg total) by mouth 2 (two) times daily.    cinacalcet (SENSIPAR) 30 MG Tab Take 1 tablet (30 mg total) by mouth every Mon, Wed, Fri.    clopidogreL (PLAVIX) 75 mg tablet Take 1 tablet (75 mg total) by mouth once daily.    [] doxycycline (VIBRA-TABS) 100 MG tablet Take 1 tablet (100 mg total) by mouth every 12 (twelve) hours. for 5 days    epoetin nina (PROCRIT) 4,000 unit/mL injection Inject 1 mL (4,000 Units total) into the skin every Mon, Wed, Fri.    famotidine (PEPCID) 20 MG tablet Take 1 tablet (20 mg total) by mouth once daily.    hydrALAZINE (APRESOLINE) 10 MG tablet Take 1 tablet (10 mg total) by mouth every 12 (twelve) hours.    insulin aspart U-100 (NOVOLOG) 100 unit/mL (3 mL) InPn pen Inject 0-10 Units into the skin before meals and at bedtime as needed (Hyperglycemia). **MODERATE CORRECTION DOSE**  Blood Glucose  mg/dL                  Pre-meal                2200  151-200                2 units                    1 unit  201-250                4 units                    2 units  251-300                6 units                    3 units  301-350                8 units                    4 units  >350                     10  "units                  5 units  Administer subcutaneously if needed at times designated by monitoring  schedule.    insulin glargine U-100, Lantus, 100 unit/mL (3 mL) SubQ InPn pen Inject 5 Units into the skin every evening.    isosorbide dinitrate (ISORDIL) 10 MG tablet Take 1 tablet (10 mg total) by mouth 2 (two) times daily.    Lactobacillus rhamnosus GG (CULTURELLE) 10 billion cell capsule Take 1 capsule by mouth once daily.    lancets 33 gauge Misc TEST 3 TIMES DAILY    oxyCODONE-acetaminophen (PERCOCET) 5-325 mg per tablet Take 1 tablet by mouth every 8 (eight) hours as needed for Pain.    pen needle, diabetic 32 gauge x 5/32" Ndle 1 Units by Misc.(Non-Drug; Combo Route) route before meals as needed (SSI).    pen needle, diabetic 33 gauge x 5/32" Ndle 1 each by Misc.(Non-Drug; Combo Route) route 3 (three) times daily.    pregabalin (LYRICA) 75 MG capsule Take 1 capsule (75 mg total) by mouth Daily.    sertraline (ZOLOFT) 100 MG tablet Take 1 tablet (100 mg total) by mouth once daily.    sodium bicarbonate 650 MG tablet Take 1 tablet (650 mg total) by mouth 3 (three) times daily.    vitamin renal formula, B-complex-vitamin c-folic acid, (NEPHROCAP) 1 mg Cap Take 1 capsule by mouth once daily.     Antibiotics (From admission, onward)      Start     Stop Route Frequency Ordered    11/17/24 1300  ceFEPIme injection 1 g         -- IV Every 24 hours (non-standard times) 11/17/24 1146    11/16/24 0903  vancomycin - pharmacy to dose  (vancomycin IVPB (PEDS and ADULTS))        Placed in "And" Linked Group    -- IV pharmacy to manage frequency 11/16/24 0804    11/15/24 1530  vancomycin 1.75 g in 5 % dextrose 500 mL IVPB  (Sepsis Workup)         -- IV Once 11/15/24 1522    11/15/24 1530  ceFEPIme injection 1 g  (Sepsis Workup)         11/16/24 0329 IV ED 1 Time 11/15/24 1522          Antifungals (From admission, onward)      None          Antivirals (From admission, onward)      None             Immunization History "   Administered Date(s) Administered    COVID-19, MRNA, LN-S, PF (Pfizer) (Purple Cap) 03/09/2021, 03/30/2021    Hepatitis B (recombinant) Adjuvanted, 2 dose 01/12/2024, 06/17/2024, 06/19/2024    Hepatitis B, Adult 06/02/2023, 07/04/2023, 08/04/2023, 01/04/2024    Influenza (FLUBLOK) - Quadrivalent - Recombinant - PF *Preferred* (egg allergy) 03/27/2024    PPD Test 05/14/2024, 11/07/2024    Pneumococcal Conjugate - 20 Valent 10/15/2024       Family History       Problem Relation (Age of Onset)    Diabetes Mother, Father, Paternal Grandmother    Heart disease Maternal Grandmother    No Known Problems Maternal Grandfather, Paternal Grandfather          Social History     Socioeconomic History    Marital status:    Occupational History    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      Social Drivers of Health     Financial Resource Strain: Low Risk  (11/16/2024)    Overall Financial Resource Strain (CARDIA)     Difficulty of Paying Living Expenses: Not hard at all   Recent Concern: Financial Resource Strain - Medium Risk (10/29/2024)    Overall Financial Resource Strain (CARDIA)     Difficulty of Paying Living Expenses: Somewhat hard   Food Insecurity: No Food Insecurity (11/16/2024)    Hunger Vital Sign     Worried About Running Out of Food in the Last Year: Never true     Ran Out of Food in the Last Year: Never true   Transportation Needs: No Transportation Needs (11/16/2024)    TRANSPORTATION NEEDS     Transportation : No   Physical Activity: Inactive (11/14/2024)    Exercise Vital Sign     Days of Exercise per Week: 0 days     Minutes of Exercise per Session: 0 min   Stress: No Stress Concern Present (11/16/2024)    [employer] [employer] - Occupational Stress Questionnaire     Feeling of Stress : Not at all   Recent Concern: Stress - Stress Concern Present (10/29/2024)    [employer] - Occupational Stress Questionnaire     Feeling of Stress : To some extent   Housing Stability: Low Risk  (11/16/2024)    Housing Stability Vital Sign     Unable to Pay for Housing in the Last Year: No     Homeless in the Last Year: No     Review of Systems   Constitutional:  Negative for chills, fatigue and fever.   Respiratory:  Negative for shortness of breath and wheezing.    Cardiovascular:  Negative for chest pain.   Gastrointestinal:  Negative for abdominal pain, constipation, diarrhea, nausea and vomiting.   Genitourinary:  Negative for difficulty urinating, dysuria, frequency, urgency, vaginal discharge and vaginal pain.   Musculoskeletal:  Negative for back pain.   Skin:  Positive for wound.   Neurological:  Positive for weakness.   Psychiatric/Behavioral:  Positive for confusion.      Objective:     Vital Signs (Most Recent):  Temp: 97.6 °F (36.4 °C) (11/18/24 1118)  Pulse: 76 (11/18/24 1146)  Resp: 16 (11/18/24 1156)  BP: 139/74 (11/18/24 1118)  SpO2: 97 % (11/18/24 1118) Vital Signs (24h Range):  Temp:  [97.6 °F (36.4 °C)-98.6 °F (37 °C)] 97.6 °F (36.4 °C)  Pulse:  [73-87] 76  Resp:  [16-18] 16  SpO2:  [95 %-100 %] 97 %  BP: (131-169)/(67-84) 139/74     Weight: 71.2 kg (156 lb 15.5 oz)  Body mass index is 47.9 kg/m².    Estimated Creatinine Clearance: 11 mL/min (A) (based on SCr of 4.2 mg/dL (H)).     Physical Exam  Vitals and nursing note reviewed.   Constitutional:       General: She is not in acute distress.     Appearance: She is ill-appearing.       HENT:      Head: Normocephalic and atraumatic.      Nose: Nose normal.   Cardiovascular:      Rate and Rhythm: Normal rate and regular rhythm.      Heart sounds: No murmur heard.  Pulmonary:      Effort: Pulmonary effort is normal. No respiratory distress.      Breath  sounds: No wheezing.   Abdominal:      General: There is no distension.   Skin:     Findings: No lesion or rash.   Neurological:      Mental Status: She is alert and oriented to person, place, and time.   Psychiatric:         Mood and Affect: Mood normal.          Significant Labs: Blood Culture:   Recent Labs   Lab 06/12/24  0949 06/12/24  0954 10/29/24  1045 11/15/24  1543   LABBLOO No growth after 5 days. No growth after 5 days. No growth after 5 days.  No growth after 5 days. No Growth to date  No Growth to date  No Growth to date  No Growth to date  No Growth to date  No Growth to date     All pertinent labs within the past 24 hours have been reviewed.    Significant Imaging: I have reviewed all pertinent imaging results/findings within the past 24 hours.

## 2024-11-18 NOTE — SUBJECTIVE & OBJECTIVE
Interval History:  Seen and evaluated on general medical floor  No acute events overnight    Clinical status improved  No new complaints    Objective:     Vital Signs (Most Recent):  Temp: 97.6 °F (36.4 °C) (11/18/24 1118)  Pulse: 76 (11/18/24 1146)  Resp: 16 (11/18/24 1156)  BP: 139/74 (11/18/24 1118)  SpO2: 97 % (11/18/24 1118) Vital Signs (24h Range):  Temp:  [97.6 °F (36.4 °C)-98.6 °F (37 °C)] 97.6 °F (36.4 °C)  Pulse:  [73-87] 76  Resp:  [16-18] 16  SpO2:  [95 %-100 %] 97 %  BP: (131-169)/(67-84) 139/74     Weight: 71.2 kg (156 lb 15.5 oz)  Body mass index is 47.9 kg/m².  No intake or output data in the 24 hours ending 11/18/24 1309      Physical Exam  Constitutional:       Appearance: She is ill-appearing (chronically).   HENT:      Head: Normocephalic and atraumatic.      Right Ear: External ear normal.      Left Ear: External ear normal.   Eyes:      Extraocular Movements: Extraocular movements intact.      Conjunctiva/sclera: Conjunctivae normal.      Pupils: Pupils are equal, round, and reactive to light.   Cardiovascular:      Rate and Rhythm: Normal rate and regular rhythm.   Pulmonary:      Effort: Pulmonary effort is normal. No respiratory distress.   Abdominal:      General: Abdomen is flat. Bowel sounds are normal.   Musculoskeletal:         General: Tenderness (LLE) and deformity (bilateral BKA) present.   Skin:     Findings: No lesion.          Neurological:      General: No focal deficit present.      Mental Status: She is alert and oriented to person, place, and time.   Psychiatric:         Mood and Affect: Mood normal.             Significant Labs: All pertinent labs within the past 24 hours have been reviewed.    Significant Imaging: I have reviewed all pertinent imaging results/findings within the past 24 hours.

## 2024-11-19 ENCOUNTER — ANESTHESIA (OUTPATIENT)
Dept: ENDOSCOPY | Facility: HOSPITAL | Age: 59
End: 2024-11-19
Payer: MEDICARE

## 2024-11-19 ENCOUNTER — ANESTHESIA EVENT (OUTPATIENT)
Dept: ENDOSCOPY | Facility: HOSPITAL | Age: 59
End: 2024-11-19
Payer: MEDICARE

## 2024-11-19 LAB
ALBUMIN SERPL BCP-MCNC: 1.8 G/DL (ref 3.5–5.2)
ANION GAP SERPL CALC-SCNC: 10 MMOL/L (ref 8–16)
BUN SERPL-MCNC: 15 MG/DL (ref 6–20)
BUN SERPL-MCNC: 46 MG/DL (ref 6–20)
BUN SERPL-MCNC: 46 MG/DL (ref 6–20)
CALCIUM SERPL-MCNC: 7.6 MG/DL (ref 8.7–10.5)
CHLORIDE SERPL-SCNC: 93 MMOL/L (ref 95–110)
CO2 SERPL-SCNC: 24 MMOL/L (ref 23–29)
CREAT SERPL-MCNC: 4.8 MG/DL (ref 0.5–1.4)
ERYTHROCYTE [DISTWIDTH] IN BLOOD BY AUTOMATED COUNT: 18.4 % (ref 11.5–14.5)
EST. GFR  (NO RACE VARIABLE): 9.9 ML/MIN/1.73 M^2
GLUCOSE SERPL-MCNC: 115 MG/DL (ref 70–110)
HCT VFR BLD AUTO: 22.3 % (ref 37–48.5)
HGB BLD-MCNC: 7 G/DL (ref 12–16)
MAGNESIUM SERPL-MCNC: 1.8 MG/DL (ref 1.6–2.6)
MCH RBC QN AUTO: 30.7 PG (ref 27–31)
MCHC RBC AUTO-ENTMCNC: 31.4 G/DL (ref 32–36)
MCV RBC AUTO: 98 FL (ref 82–98)
PHOSPHATE SERPL-MCNC: 5.5 MG/DL (ref 2.7–4.5)
PLATELET # BLD AUTO: 364 K/UL (ref 150–450)
PMV BLD AUTO: 10.8 FL (ref 9.2–12.9)
POCT GLUCOSE: 104 MG/DL (ref 70–110)
POCT GLUCOSE: 118 MG/DL (ref 70–110)
POCT GLUCOSE: 85 MG/DL (ref 70–110)
POCT GLUCOSE: 98 MG/DL (ref 70–110)
POTASSIUM SERPL-SCNC: 4.2 MMOL/L (ref 3.5–5.1)
RBC # BLD AUTO: 2.28 M/UL (ref 4–5.4)
SODIUM SERPL-SCNC: 127 MMOL/L (ref 136–145)
VANCOMYCIN SERPL-MCNC: 12.2 UG/ML
WBC # BLD AUTO: 7.17 K/UL (ref 3.9–12.7)

## 2024-11-19 PROCEDURE — 63600175 PHARM REV CODE 636 W HCPCS: Mod: JG,HCNC

## 2024-11-19 PROCEDURE — 25000003 PHARM REV CODE 250: Mod: HCNC | Performed by: NURSE ANESTHETIST, CERTIFIED REGISTERED

## 2024-11-19 PROCEDURE — 25000003 PHARM REV CODE 250: Mod: HCNC | Performed by: ANESTHESIOLOGY

## 2024-11-19 PROCEDURE — 80069 RENAL FUNCTION PANEL: CPT | Mod: HCNC

## 2024-11-19 PROCEDURE — 25500020 PHARM REV CODE 255: Mod: HCNC

## 2024-11-19 PROCEDURE — 36415 COLL VENOUS BLD VENIPUNCTURE: CPT | Mod: HCNC

## 2024-11-19 PROCEDURE — A4216 STERILE WATER/SALINE, 10 ML: HCPCS | Mod: HCNC | Performed by: ANESTHESIOLOGY

## 2024-11-19 PROCEDURE — 80202 ASSAY OF VANCOMYCIN: CPT | Mod: HCNC

## 2024-11-19 PROCEDURE — 25000003 PHARM REV CODE 250: Mod: HCNC

## 2024-11-19 PROCEDURE — 82962 GLUCOSE BLOOD TEST: CPT | Mod: HCNC

## 2024-11-19 PROCEDURE — 84520 ASSAY OF UREA NITROGEN: CPT | Mod: HCNC

## 2024-11-19 PROCEDURE — 99232 SBSQ HOSP IP/OBS MODERATE 35: CPT | Mod: HCNC,GC,,

## 2024-11-19 PROCEDURE — 90935 HEMODIALYSIS ONE EVALUATION: CPT | Mod: HCNC,,, | Performed by: NURSE PRACTITIONER

## 2024-11-19 PROCEDURE — 83735 ASSAY OF MAGNESIUM: CPT | Mod: HCNC

## 2024-11-19 PROCEDURE — 63600175 PHARM REV CODE 636 W HCPCS: Mod: HCNC | Performed by: NURSE ANESTHETIST, CERTIFIED REGISTERED

## 2024-11-19 PROCEDURE — 21400001 HC TELEMETRY ROOM: Mod: HCNC

## 2024-11-19 PROCEDURE — 80100016 HC MAINTENANCE HEMODIALYSIS: Mod: HCNC

## 2024-11-19 PROCEDURE — 85027 COMPLETE CBC AUTOMATED: CPT | Mod: HCNC

## 2024-11-19 PROCEDURE — 37000009 HC ANESTHESIA EA ADD 15 MINS: Mod: HCNC

## 2024-11-19 PROCEDURE — 37000008 HC ANESTHESIA 1ST 15 MINUTES: Mod: HCNC

## 2024-11-19 PROCEDURE — 5A1D70Z PERFORMANCE OF URINARY FILTRATION, INTERMITTENT, LESS THAN 6 HOURS PER DAY: ICD-10-PCS | Performed by: INTERNAL MEDICINE

## 2024-11-19 PROCEDURE — 71000044 HC DOSC ROUTINE RECOVERY FIRST HOUR: Mod: HCNC

## 2024-11-19 PROCEDURE — 63600175 PHARM REV CODE 636 W HCPCS: Mod: HCNC

## 2024-11-19 RX ORDER — ONDANSETRON HYDROCHLORIDE 2 MG/ML
4 INJECTION, SOLUTION INTRAVENOUS DAILY PRN
Status: DISCONTINUED | OUTPATIENT
Start: 2024-11-19 | End: 2024-11-19 | Stop reason: HOSPADM

## 2024-11-19 RX ORDER — DEXMEDETOMIDINE HYDROCHLORIDE 100 UG/ML
INJECTION, SOLUTION INTRAVENOUS
Status: DISCONTINUED | OUTPATIENT
Start: 2024-11-19 | End: 2024-11-19

## 2024-11-19 RX ORDER — SODIUM CHLORIDE 0.9 % (FLUSH) 0.9 %
3 SYRINGE (ML) INJECTION EVERY 6 HOURS
Status: DISCONTINUED | OUTPATIENT
Start: 2024-11-19 | End: 2024-12-05 | Stop reason: HOSPADM

## 2024-11-19 RX ORDER — GLUCAGON 1 MG
1 KIT INJECTION
Status: DISCONTINUED | OUTPATIENT
Start: 2024-11-19 | End: 2024-11-19 | Stop reason: HOSPADM

## 2024-11-19 RX ORDER — MIDAZOLAM HYDROCHLORIDE 1 MG/ML
INJECTION INTRAMUSCULAR; INTRAVENOUS
Status: DISCONTINUED | OUTPATIENT
Start: 2024-11-19 | End: 2024-11-19

## 2024-11-19 RX ORDER — MIDAZOLAM HYDROCHLORIDE 1 MG/ML
INJECTION, SOLUTION INTRAMUSCULAR; INTRAVENOUS
Status: COMPLETED
Start: 2024-11-19 | End: 2024-11-19

## 2024-11-19 RX ORDER — PROCHLORPERAZINE EDISYLATE 5 MG/ML
5 INJECTION INTRAMUSCULAR; INTRAVENOUS EVERY 30 MIN PRN
Status: DISCONTINUED | OUTPATIENT
Start: 2024-11-19 | End: 2024-11-19 | Stop reason: HOSPADM

## 2024-11-19 RX ORDER — MUPIROCIN 20 MG/G
OINTMENT TOPICAL 2 TIMES DAILY
Status: DISPENSED | OUTPATIENT
Start: 2024-11-19 | End: 2024-11-24

## 2024-11-19 RX ORDER — FENTANYL CITRATE 50 UG/ML
INJECTION, SOLUTION INTRAMUSCULAR; INTRAVENOUS
Status: DISCONTINUED | OUTPATIENT
Start: 2024-11-19 | End: 2024-11-19

## 2024-11-19 RX ORDER — FENTANYL CITRATE 50 UG/ML
INJECTION, SOLUTION INTRAMUSCULAR; INTRAVENOUS
Status: COMPLETED
Start: 2024-11-19 | End: 2024-11-19

## 2024-11-19 RX ADMIN — Medication 3 ML: at 06:11

## 2024-11-19 RX ADMIN — IOHEXOL 100 ML: 350 INJECTION, SOLUTION INTRAVENOUS at 04:11

## 2024-11-19 RX ADMIN — FENTANYL CITRATE 50 MCG: 50 INJECTION, SOLUTION INTRAMUSCULAR; INTRAVENOUS at 02:11

## 2024-11-19 RX ADMIN — EPOETIN ALFA-EPBX 3560 UNITS: 4000 INJECTION, SOLUTION INTRAVENOUS; SUBCUTANEOUS at 10:11

## 2024-11-19 RX ADMIN — OXYCODONE HYDROCHLORIDE AND ACETAMINOPHEN 1 TABLET: 5; 325 TABLET ORAL at 02:11

## 2024-11-19 RX ADMIN — ISOSORBIDE DINITRATE 10 MG: 10 TABLET ORAL at 09:11

## 2024-11-19 RX ADMIN — MIDAZOLAM HYDROCHLORIDE 2 MG: 2 INJECTION, SOLUTION INTRAMUSCULAR; INTRAVENOUS at 01:11

## 2024-11-19 RX ADMIN — CARVEDILOL 3.12 MG: 3.12 TABLET, FILM COATED ORAL at 09:11

## 2024-11-19 RX ADMIN — HEPARIN SODIUM 1000 UNITS: 1000 INJECTION, SOLUTION INTRAVENOUS; SUBCUTANEOUS at 11:11

## 2024-11-19 RX ADMIN — HYDRALAZINE HYDROCHLORIDE 10 MG: 10 TABLET ORAL at 09:11

## 2024-11-19 RX ADMIN — DEXMEDETOMIDINE 8 MCG: 100 INJECTION, SOLUTION, CONCENTRATE INTRAVENOUS at 01:11

## 2024-11-19 RX ADMIN — OXYCODONE HYDROCHLORIDE AND ACETAMINOPHEN 1 TABLET: 5; 325 TABLET ORAL at 09:11

## 2024-11-19 RX ADMIN — ACETAMINOPHEN 1000 MG: 500 TABLET ORAL at 09:11

## 2024-11-19 NOTE — ASSESSMENT & PLAN NOTE
MR non diagnostic due to motion artifact  Cont vanc/cefepime  Surgery consulted in ED; no current plans for intervention  Follow cultures if available  Repeat MR with sedation  Obtain CTA with runoff; consider vascular v IR consult pending this  ID consulted, appreciate recommendations

## 2024-11-19 NOTE — ANESTHESIA POSTPROCEDURE EVALUATION
Anesthesia Post Evaluation    Patient: Suyapa Connelly    Procedure(s) Performed: Procedure(s) (LRB):  MRI (Magnetic Resonance Imagine) (Left)    Final Anesthesia Type: MAC      Patient location during evaluation: PACU  Patient participation: Yes- Able to Participate  Level of consciousness: awake and alert and oriented  Post-procedure vital signs: reviewed and stable  Pain management: adequate  Airway patency: patent    PONV status at discharge: No PONV  Anesthetic complications: no      Cardiovascular status: stable  Respiratory status: unassisted, spontaneous ventilation and room air  Hydration status: euvolemic  Follow-up not needed.              Vitals Value Taken Time   /68 11/19/24 1502   Temp 37 °C (98.6 °F) 11/19/24 1413   Pulse 79 11/19/24 1515   Resp 14 11/19/24 1515   SpO2 100 % 11/19/24 1515   Vitals shown include unfiled device data.      No case tracking events are documented in the log.      Pain/Shannan Score: Pain Rating Prior to Med Admin: 10 (11/19/2024  2:39 PM)  Shannan Score: 10 (11/19/2024  2:30 PM)

## 2024-11-19 NOTE — PROGRESS NOTES
"[facility]  Infectious Disease  Progress Note    Patient Name: Suyapa Connelly  MRN: [id number]  Admission Date: 11/15/2024  Length of Stay: 3 days  Attending Physician: Cody Traore DO  Primary Care Provider: Vanessa Noel MD    Isolation Status: No active isolations  Assessment/Plan:      ID  * Cellulitis of left thigh  60 y/o female PMHx: bilateral BKA, ESRD on HD, DM, HTN, HLD presents with purulence from L stump wound. Recently admitted and received IV abx during that admission and on discharge was transitioned to oral augmentin and doxy and she went to SNF.  She was sent back to the ED on 11/15 for continued purulence and LLE pain.  Surgery was consulted and have no plans for intervention at this time. WBC WNL. She has been admitted to hospital medicine for further management. MRI results below. MRI femur results pending.     ID consulted for "antibiotic management"    Recommendations / Plan  Continue vanc (pharm to dose) and Cefepime 1 g q24 hrs  Recommend wound care  Will f/u with imaging results     -- Discussed with ID staff Dr. Vidal and primary team  -- ID will continue to follow for further recs          Anticipated Disposition: TBD    Thank you for your consult. I will follow-up with patient. Please contact us if you have any additional questions.    ADRIAN Abdi  Infectious Disease  [facility]    Subjective:     Principal Problem:Cellulitis of left thigh    HPI: 60 y/o female PMHx: bilateral BKA, ESRD on HD, DM, HTN, HLD presents with purulence from L stump wound.  She was discharged from this facility 11/12/24 with oral abx for LLE cellulitis after bring admitted for septic shock.  During that admission there were concerns for nec fasc as there was subcutaneous emphysema noted on CT, however, nec fasc was ruled out.  She did receive IV abx during that admission and on dishcharge was transitioned to oral augmentin and doxy and she went to SNF.  She was sent back " to the ED for continued purulence and LLE pain.  Surgery was consulted and have no plans for intervention at this time. WBC WNL. She has been admitted to hospital medicine for further management. MRI results below. MRI femur results pending      -- ID will continue to follow for further recs    Interval History: NAEON  Afebrile  No leukocytosis     Review of Systems  Objective:     Vital Signs (Most Recent):  Temp: 97.6 °F (36.4 °C) (11/19/24 0733)  Pulse: 78 (11/19/24 1115)  Resp: 20 (11/19/24 0733)  BP: (!) 142/57 (11/19/24 1115)  SpO2: 95 % (11/19/24 0733) Vital Signs (24h Range):  Temp:  [97 °F (36.1 °C)-98.1 °F (36.7 °C)] 97.6 °F (36.4 °C)  Pulse:  [72-80] 78  Resp:  [16-20] 20  SpO2:  [95 %-98 %] 95 %  BP: (123-167)/(57-80) 142/57     Weight: 71.2 kg (156 lb 15.5 oz)  Body mass index is 47.9 kg/m².    Estimated Creatinine Clearance: 9.7 mL/min (A) (based on SCr of 4.8 mg/dL (H)).     Physical Exam     Significant Labs: All pertinent labs within the past 24 hours have been reviewed.    Significant Imaging: I have reviewed all pertinent imaging results/findings within the past 24 hours.

## 2024-11-19 NOTE — TRANSFER OF CARE
Anesthesia Transfer of Care Note    Patient: Suyapa Connelly    Procedure(s) Performed: Procedure(s) (LRB):  MRI (Magnetic Resonance Imagine) (Left)    Patient location: PACU    Anesthesia Type: MAC    Transport from OR: Transported from OR on room air with adequate spontaneous ventilation    Post pain: adequate analgesia    Post assessment: no apparent anesthetic complications and tolerated procedure well    Post vital signs: stable    Level of consciousness: responds to stimulation and sedated    Nausea/Vomiting: no nausea/vomiting    Complications: none    Transfer of care protocol was followed      Last vitals: Visit Vitals  BP (!) 149/60 (BP Location: Right arm, Patient Position: Lying)   Pulse 80   Temp 36.6 °C (97.9 °F) (Tympanic)   Resp 11   Wt 71.2 kg (156 lb 15.5 oz)   LMP 09/30/2015 (Exact Date)   SpO2 100%   Breastfeeding No   BMI 47.90 kg/m²

## 2024-11-19 NOTE — SUBJECTIVE & OBJECTIVE
Interval History:  Seen and evaluated in dialysis  No acute events overnight    Clinical status stable, unchanged  No new complaints    Objective:     Vital Signs (Most Recent):  Temp: 97.6 °F (36.4 °C) (11/19/24 0733)  Pulse: 78 (11/19/24 1130)  Resp: 20 (11/19/24 0733)  BP: (!) 142/57 (11/19/24 1115)  SpO2: 95 % (11/19/24 0733) Vital Signs (24h Range):  Temp:  [97 °F (36.1 °C)-98.1 °F (36.7 °C)] 97.6 °F (36.4 °C)  Pulse:  [72-80] 78  Resp:  [16-20] 20  SpO2:  [95 %-98 %] 95 %  BP: (123-167)/(57-80) 142/57     Weight: 71.2 kg (156 lb 15.5 oz)  Body mass index is 47.9 kg/m².  No intake or output data in the 24 hours ending 11/19/24 1138      Physical Exam  Constitutional:       Appearance: She is ill-appearing (chronically).   HENT:      Head: Normocephalic and atraumatic.      Right Ear: External ear normal.      Left Ear: External ear normal.   Eyes:      Extraocular Movements: Extraocular movements intact.      Conjunctiva/sclera: Conjunctivae normal.      Pupils: Pupils are equal, round, and reactive to light.   Cardiovascular:      Rate and Rhythm: Normal rate and regular rhythm.   Pulmonary:      Effort: Pulmonary effort is normal. No respiratory distress.   Abdominal:      General: Abdomen is flat. Bowel sounds are normal.   Musculoskeletal:         General: Tenderness (LLE) and deformity (bilateral BKA) present.   Skin:     Findings: No lesion.          Neurological:      General: No focal deficit present.      Mental Status: She is alert and oriented to person, place, and time.   Psychiatric:         Mood and Affect: Mood normal.             Significant Labs: All pertinent labs within the past 24 hours have been reviewed.    Significant Imaging: I have reviewed all pertinent imaging results/findings within the past 24 hours.

## 2024-11-19 NOTE — ASSESSMENT & PLAN NOTE
"58 y/o female PMHx: bilateral BKA, ESRD on HD, DM, HTN, HLD presents with purulence from L stump wound. Recently admitted and received IV abx during that admission and on discharge was transitioned to oral augmentin and doxy and she went to SNF.  She was sent back to the ED on 11/15 for continued purulence and LLE pain.  Surgery was consulted and have no plans for intervention at this time. WBC WNL. She has been admitted to hospital medicine for further management. MRI results below. MRI femur results pending.     ID consulted for "antibiotic management"    Recommendations / Plan  Continue vanc and Cefepime 1 g q24 hrs  Recommend wound care  Will f/u with imaging results     -- Discussed with ID staff Dr. Vidal and primary team  -- ID will continue to follow for further recs    "

## 2024-11-19 NOTE — PROGRESS NOTES
Pt arrived from floor to [facility] pre-op 1 via bed with hospital transport. Upon arrival, pt placed on bedpan per request, vitals obtained, and pre-op began.

## 2024-11-19 NOTE — CONSULTS
Pharmacokinetic Assessment Follow Up: IV Vancomycin    Vancomycin serum concentration assessment(s):    The random level was drawn correctly and can be used to guide therapy at this time. The measurement is within the desired definitive target range of 10 to 15 mcg/mL.  ESRD on HD. Last HD on 11/19/24    Vancomycin Regimen Plan:    Will dose Vancomycin 1000 mg IV x 1 after HD today  Re-dose when the random level is less than 15 mcg/mL, next level to be drawn at 0400 on 11/21/24    Drug levels (last 3 results):  Recent Labs   Lab Result Units 11/19/24  0944   Vancomycin, Random ug/mL 12.2       Pharmacy will continue to follow and monitor vancomycin.    Please contact pharmacy at extension [phone] for questions regarding this assessment.    Thank you for the consult,   Sarah Diego, PharmD       Patient brief summary:  Suyapa Connelly is a 59 y.o. female initiated on antimicrobial therapy with IV Vancomycin for treatment of skin & soft tissue infection      Drug Allergies:   Review of patient's allergies indicates:   Allergen Reactions    Ciprofloxacin Itching    Pcn [penicillins]      Rash; tolerated ceftriaxone on 1/13/20  Tolerating Augmentin November 2024       Actual Body Weight:   71.2 kg    Renal Function:   Estimated Creatinine Clearance: 9.7 mL/min (A) (based on SCr of 4.8 mg/dL (H)).,     Dialysis Method (if applicable):  intermittent HD    CBC (last 72 hours):  Recent Labs   Lab Result Units 11/17/24  0933 11/18/24  0446 11/19/24  0242   WBC K/uL 7.42 6.59 7.17   Hemoglobin g/dL 7.1* 7.0* 7.0*   Hematocrit % 22.6* 22.3* 22.3*   Platelets K/uL 373 340 364       Metabolic Panel (last 72 hours):  Recent Labs   Lab Result Units 11/17/24  0933 11/18/24  0446 11/19/24  0242 11/19/24  0838   Sodium mmol/L 131* 129* 127*  --    Potassium mmol/L 4.0 4.1 4.2  --    Chloride mmol/L 96 96 93*  --    CO2 mmol/L 23 22* 24  --    Glucose mg/dL 150* 106 115*  --    BUN mg/dL 24* 34* 46* 46*   Creatinine mg/dL 3.1* 4.2* 4.8*   --    Albumin g/dL 1.8* 1.7* 1.8*  --    Magnesium mg/dL 1.8 1.8 1.8  --    Phosphorus mg/dL 4.2 4.9* 5.5*  --        Vancomycin Administrations:  vancomycin given in the last 96 hours        No antibiotic orders with administrations found.                    Microbiologic Results:  Microbiology Results (last 7 days)       Procedure Component Value Units Date/Time    Blood culture x two cultures. Draw prior to antibiotics. [[id number]] Collected: 11/15/24 1543    Order Status: Completed Specimen: Blood from Peripheral, Antecubital, Left Updated: 11/18/24 1812     Blood Culture, Routine No Growth to date      No Growth to date      No Growth to date      No Growth to date    Narrative:      Aerobic and anaerobic    Blood culture x two cultures. Draw prior to antibiotics. [[id number]] Collected: 11/15/24 1543    Order Status: Completed Specimen: Blood from Peripheral, Forearm, Right Updated: 11/18/24 1812     Blood Culture, Routine No Growth to date      No Growth to date      No Growth to date      No Growth to date    Narrative:      Aerobic and anaerobic

## 2024-11-19 NOTE — SUBJECTIVE & OBJECTIVE
Interval History: NAEON  Afebrile  No leukocytosis     Review of Systems  Objective:     Vital Signs (Most Recent):  Temp: 97.6 °F (36.4 °C) (11/19/24 0733)  Pulse: 78 (11/19/24 1115)  Resp: 20 (11/19/24 0733)  BP: (!) 142/57 (11/19/24 1115)  SpO2: 95 % (11/19/24 0733) Vital Signs (24h Range):  Temp:  [97 °F (36.1 °C)-98.1 °F (36.7 °C)] 97.6 °F (36.4 °C)  Pulse:  [72-80] 78  Resp:  [16-20] 20  SpO2:  [95 %-98 %] 95 %  BP: (123-167)/(57-80) 142/57     Weight: 71.2 kg (156 lb 15.5 oz)  Body mass index is 47.9 kg/m².    Estimated Creatinine Clearance: 9.7 mL/min (A) (based on SCr of 4.8 mg/dL (H)).     Physical Exam     Significant Labs: All pertinent labs within the past 24 hours have been reviewed.    Significant Imaging: I have reviewed all pertinent imaging results/findings within the past 24 hours.

## 2024-11-19 NOTE — PROGRESS NOTES
[facility]  Progress Note    Patient Name: Suyapa Connelly  MRN: [id number]  Patient Class: IP- Inpatient   Admission Date: 11/15/2024  Length of Stay: 3 days  Attending Physician: Cody Traore DO  Primary Care Provider: Vanessa Noel MD        Subjective:     Principal Problem:Cellulitis of left thigh        HPI:  58yo female whose PMH includes bilateral BKA, ESRD, DM, HTN, HLD presents with purulence from L stump wound.  She was discharged from this facility 11.12.24 with oral abx or LLE cellulitis after bring admitted for septic shock.  During that admission general surgery was consulted for further evaluation d/t concerns for nec fasc as there was subcutaneous emphysema noted on CT; after their clinical assessment nec fasc was ruled out.  She did receive IV abx during that admission and on discharge was transitioned to oral augmentin and doxy and she went to SNF.  She was sent back to the ED for continued purulence and LLE pain.  Surgery was consulted in the ED and have no plans for intervention at this time.  MR is pending.  White count is normal.  She has been admitted to medicine for further management.    Overview/Hospital Course:  Admitted for LLE cellulitis; failed outpatient oral abx.  Discharged from this facility 11.12.24 with oral abx or LLE cellulitis after bring admitted for septic shock; on discharge was transitioned to oral augmentin and doxy and she went to SNF.  Sent back to the ED for continued purulence and LLE pain.  Surgery was consulted in the ED and have no plans for intervention at this time.  Repeat CT here showed postop change of bilateral above knee amputation, no destructive osseous process to suggest osteomyelitis in the remaining portions of the left femur or in the visualized proximal portion of the cut right femur, skin defect along the medial aspect of the left thigh with foci of air underlying the wound, additional area of soft tissue contour  indentation and focal skin thickening along the anterior thigh, suggestive of possible infectious or inflammatory process versus region of scarring, additional extensive soft tissue edema and skin thickening of the pelvis, partially visualized right lower extremity, and left lower extremity.  MR non diagnostic due to motion artifact.  White count is normal.  Receiving vanc/cefepime; ID consulted.  Obtaining repeat MR with sedation as well as CTA with runoff given poor wound healing and known vascular disease.    Interval History:  Seen and evaluated in dialysis  No acute events overnight    Clinical status stable, unchanged  No new complaints    Objective:     Vital Signs (Most Recent):  Temp: 97.6 °F (36.4 °C) (11/19/24 0733)  Pulse: 78 (11/19/24 1130)  Resp: 20 (11/19/24 0733)  BP: (!) 142/57 (11/19/24 1115)  SpO2: 95 % (11/19/24 0733) Vital Signs (24h Range):  Temp:  [97 °F (36.1 °C)-98.1 °F (36.7 °C)] 97.6 °F (36.4 °C)  Pulse:  [72-80] 78  Resp:  [16-20] 20  SpO2:  [95 %-98 %] 95 %  BP: (123-167)/(57-80) 142/57     Weight: 71.2 kg (156 lb 15.5 oz)  Body mass index is 47.9 kg/m².  No intake or output data in the 24 hours ending 11/19/24 1138      Physical Exam  Constitutional:       Appearance: She is ill-appearing (chronically).   HENT:      Head: Normocephalic and atraumatic.      Right Ear: External ear normal.      Left Ear: External ear normal.   Eyes:      Extraocular Movements: Extraocular movements intact.      Conjunctiva/sclera: Conjunctivae normal.      Pupils: Pupils are equal, round, and reactive to light.   Cardiovascular:      Rate and Rhythm: Normal rate and regular rhythm.   Pulmonary:      Effort: Pulmonary effort is normal. No respiratory distress.   Abdominal:      General: Abdomen is flat. Bowel sounds are normal.   Musculoskeletal:         General: Tenderness (LLE) and deformity (bilateral BKA) present.   Skin:     Findings: No lesion.          Neurological:      General: No focal deficit  present.      Mental Status: She is alert and oriented to person, place, and time.   Psychiatric:         Mood and Affect: Mood normal.             Significant Labs: All pertinent labs within the past 24 hours have been reviewed.    Significant Imaging: I have reviewed all pertinent imaging results/findings within the past 24 hours.    Assessment/Plan:      * Cellulitis of left thigh  MR non diagnostic due to motion artifact  Cont vanc/cefepime  Surgery consulted in ED; no current plans for intervention  Follow cultures if available  Repeat MR with sedation  Obtain CTA with runoff; consider vascular v IR consult pending this  ID consulted, appreciate recommendations    Severe obesity (BMI >= 40)  Body mass index is 47.6 kg/m².  Would benefit from dietary and lifestyle modifications in relation to health  Consider dietary/nutrition consult outpatient        ESRD (end stage renal disease)  Consult nephro for HD management  HD TThS    Paroxysmal atrial fibrillation  Cont eliquis    Anemia due to chronic kidney disease, on chronic dialysis  Close monitoring  No indication for transfusion at this time  Goal directed resuscitation, transfuse for Hgb<7    CAD (coronary artery disease)  Cont plavix, eliquis  Cont statin    Type 2 diabetes mellitus with hyperglycemia, with long-term current use of insulin  Slide  Diabetic diet when not NPO  ACHS glucose checks    CAROLIN (generalized anxiety disorder)  Cont zoloft    Peripheral vascular disease  Cont statin  Cont eliquis and plavix  See plan for cellulitis      VTE Risk Mitigation (From admission, onward)           Ordered     heparin (porcine) injection 1,000 Units  As needed (PRN)         11/16/24 1159     apixaban tablet 5 mg  2 times daily         11/15/24 1833     IP VTE HIGH RISK PATIENT  Once         11/15/24 1648     Place sequential compression device  Until discontinued         11/15/24 1648     Reason for No Pharmacological VTE Prophylaxis  Once        Question:   Reasons:  Answer:  Already adequately anticoagulated on oral Anticoagulants    11/15/24 1648                    Discharge Planning   DEVAN: 11/24/2024     Code Status: Full Code   Is the patient medically ready for discharge?:     Reason for patient still in hospital (select all that apply): Patient trending condition  Discharge Plan A: Return to nursing home                  Cody Traore DO  Department of Hospital Medicine   [facility]

## 2024-11-19 NOTE — PROGRESS NOTES
[facility] STAFF HEMODIALYSIS NOTE     Patient currently on hemodialysis for removal of uremic toxins and volume.     Patient seen and evaluated on hemodialysis, tolerating treatment, see HD flowsheet for vitals and assessments.    Labs have been reviewed and the dialysate bath has been adjusted.       Assessment/Plan:    -Hyponatremia in setting of ESRD, Na bath adjusted. 1L fluid restrictions   -Patient seen on HD, tolerating treatment well, w/o complaints   -UF goal of 2-2.5L  -Renal diet, if not NPO   -Strict I/O's and daily weights  -Daily renal function panels  -Keep MAP >65 while on HD   -Hgb goal 10-11, continue epo   -continue cinacaclet   -continue calcitriol  -discontinue bicarb PO , bicarbn bath with HD   -conitnue sevelamer   -Will continue to follow while inpatient     Katie Monique DNP-FNP, C  Nephrology  Pager: [phone]

## 2024-11-19 NOTE — ANESTHESIA PREPROCEDURE EVALUATION
2024  Suyapa Connelly is a 59 y.o., female.    Procedure: MRI (Magnetic Resonance Imagine) (Left)   Anesthesia type: General   Diagnosis: Cellulitis, unspecified cellulitis site [L03.90]     HPI:  60yo female whose PMH includes bilateral BKA, ESRD, DM, HTN, HLD presents with purulence from L stump wound.  She was discharged from this facility 24 with oral abx or LLE cellulitis after bring admitted for septic shock.       Pre-operative evaluation for Procedure(s) (LRB):  MRI (Magnetic Resonance Imagine) (Left)      Encounter Diagnoses   Name Primary?    Knee pain     Chest pain     Cellulitis of left thigh Yes    ESRD (end stage renal disease)     Cellulitis, unspecified cellulitis site        Review of patient's allergies indicates:   Allergen Reactions    Ciprofloxacin Itching    Iodine      Kidney injury    Pcn [penicillins]      Rash; tolerated ceftriaxone on 20  Tolerating Augmentin 2024       Medications Prior to Admission   Medication Sig Dispense Refill Last Dose/Taking    acetaminophen (TYLENOL) 325 MG tablet Take 2 tablets (650 mg total) by mouth every 8 (eight) hours as needed for Pain.       allopurinoL (ZYLOPRIM) 100 MG tablet Take 0.5 tablets (50 mg total) by mouth every other day. 8 tablet 11     [] amoxicillin-clavulanate 500-125mg (AUGMENTIN) 500-125 mg Tab Take 1 tablet (500 mg total) by mouth 2 (two) times daily. for 5 days       apixaban (ELIQUIS) 5 mg Tab Take 1 tablet (5 mg total) by mouth 2 (two) times daily. 60 tablet 11     atorvastatin (LIPITOR) 80 MG tablet Take 1 tablet (80 mg total) by mouth once daily. 30 tablet 11     blood sugar diagnostic Strp 1 each by Misc.(Non-Drug; Combo Route) route 3 (three) times daily. 200 each 0     blood sugar diagnostic Strp Use to check blood glucose 2 (two) times a day. 100 strip 0     blood-glucose meter Misc  TEST TWICE DAILY 1 each 0     blood-glucose sensor (DEXCOM G7 SENSOR) Radha 1 each by Misc.(Non-Drug; Combo Route) route once daily. 5 each 0     calcitRIOL (ROCALTROL) 0.5 MCG Cap Take 1 capsule (0.5 mcg total) by mouth once daily. 30 capsule 11     carvediloL (COREG) 6.25 MG tablet Take 0.5 tablets (3.125 mg total) by mouth 2 (two) times daily.       cinacalcet (SENSIPAR) 30 MG Tab Take 1 tablet (30 mg total) by mouth every Mon, Wed, Fri.       clopidogreL (PLAVIX) 75 mg tablet Take 1 tablet (75 mg total) by mouth once daily. 30 tablet 11     [] doxycycline (VIBRA-TABS) 100 MG tablet Take 1 tablet (100 mg total) by mouth every 12 (twelve) hours. for 5 days       epoetin nina (PROCRIT) 4,000 unit/mL injection Inject 1 mL (4,000 Units total) into the skin every Mon, Wed, Fri.       famotidine (PEPCID) 20 MG tablet Take 1 tablet (20 mg total) by mouth once daily.       hydrALAZINE (APRESOLINE) 10 MG tablet Take 1 tablet (10 mg total) by mouth every 12 (twelve) hours.       insulin aspart U-100 (NOVOLOG) 100 unit/mL (3 mL) InPn pen Inject 0-10 Units into the skin before meals and at bedtime as needed (Hyperglycemia). **MODERATE CORRECTION DOSE**  Blood Glucose  mg/dL                  Pre-meal                2200  151-200                2 units                    1 unit  201-250                4 units                    2 units  251-300                6 units                    3 units  301-350                8 units                    4 units  >350                     10 units                  5 units  Administer subcutaneously if needed at times designated by monitoring  schedule.       insulin glargine U-100, Lantus, 100 unit/mL (3 mL) SubQ InPn pen Inject 5 Units into the skin every evening.       isosorbide dinitrate (ISORDIL) 10 MG tablet Take 1 tablet (10 mg total) by mouth 2 (two) times daily.       Lactobacillus rhamnosus GG (CULTURELLE) 10 billion cell capsule Take 1 capsule by mouth once daily.        "lancets 33 gauge Misc TEST 3 TIMES DAILY 100 each 3     oxyCODONE-acetaminophen (PERCOCET) 5-325 mg per tablet Take 1 tablet by mouth every 8 (eight) hours as needed for Pain. 10 tablet 0     pen needle, diabetic 32 gauge x 5/32" Ndle 1 Units by Misc.(Non-Drug; Combo Route) route before meals as needed (SSI). 100 each 3     pen needle, diabetic 33 gauge x 5/32" Ndle 1 each by Misc.(Non-Drug; Combo Route) route 3 (three) times daily. 100 each 0     pregabalin (LYRICA) 75 MG capsule Take 1 capsule (75 mg total) by mouth Daily. 7 capsule 0     sertraline (ZOLOFT) 100 MG tablet Take 1 tablet (100 mg total) by mouth once daily. 30 tablet 11     sodium bicarbonate 650 MG tablet Take 1 tablet (650 mg total) by mouth 3 (three) times daily. 90 tablet 11     vitamin renal formula, B-complex-vitamin c-folic acid, (NEPHROCAP) 1 mg Cap Take 1 capsule by mouth once daily.               No current facility-administered medications on file prior to encounter.     Current Outpatient Medications on File Prior to Encounter   Medication Sig Dispense Refill    acetaminophen (TYLENOL) 325 MG tablet Take 2 tablets (650 mg total) by mouth every 8 (eight) hours as needed for Pain.      allopurinoL (ZYLOPRIM) 100 MG tablet Take 0.5 tablets (50 mg total) by mouth every other day. 8 tablet 11    apixaban (ELIQUIS) 5 mg Tab Take 1 tablet (5 mg total) by mouth 2 (two) times daily. 60 tablet 11    atorvastatin (LIPITOR) 80 MG tablet Take 1 tablet (80 mg total) by mouth once daily. 30 tablet 11    blood sugar diagnostic Strp 1 each by Misc.(Non-Drug; Combo Route) route 3 (three) times daily. 200 each 0    blood sugar diagnostic Strp Use to check blood glucose 2 (two) times a day. 100 strip 0    blood-glucose meter Misc TEST TWICE DAILY 1 each 0    blood-glucose sensor (DEXCOM G7 SENSOR) Radha 1 each by Misc.(Non-Drug; Combo Route) route once daily. 5 each 0    calcitRIOL (ROCALTROL) 0.5 MCG Cap Take 1 capsule (0.5 mcg total) by mouth once daily. 30 " "capsule 11    carvediloL (COREG) 6.25 MG tablet Take 0.5 tablets (3.125 mg total) by mouth 2 (two) times daily.      cinacalcet (SENSIPAR) 30 MG Tab Take 1 tablet (30 mg total) by mouth every Mon, Wed, Fri.      clopidogreL (PLAVIX) 75 mg tablet Take 1 tablet (75 mg total) by mouth once daily. 30 tablet 11    epoetin nina (PROCRIT) 4,000 unit/mL injection Inject 1 mL (4,000 Units total) into the skin every Mon, Wed, Fri.      famotidine (PEPCID) 20 MG tablet Take 1 tablet (20 mg total) by mouth once daily.      hydrALAZINE (APRESOLINE) 10 MG tablet Take 1 tablet (10 mg total) by mouth every 12 (twelve) hours.      insulin aspart U-100 (NOVOLOG) 100 unit/mL (3 mL) InPn pen Inject 0-10 Units into the skin before meals and at bedtime as needed (Hyperglycemia). **MODERATE CORRECTION DOSE**  Blood Glucose  mg/dL                  Pre-meal                2200  151-200                2 units                    1 unit  201-250                4 units                    2 units  251-300                6 units                    3 units  301-350                8 units                    4 units  >350                     10 units                  5 units  Administer subcutaneously if needed at times designated by monitoring  schedule.      insulin glargine U-100, Lantus, 100 unit/mL (3 mL) SubQ InPn pen Inject 5 Units into the skin every evening.      isosorbide dinitrate (ISORDIL) 10 MG tablet Take 1 tablet (10 mg total) by mouth 2 (two) times daily.      Lactobacillus rhamnosus GG (CULTURELLE) 10 billion cell capsule Take 1 capsule by mouth once daily.      lancets 33 gauge Misc TEST 3 TIMES DAILY 100 each 3    oxyCODONE-acetaminophen (PERCOCET) 5-325 mg per tablet Take 1 tablet by mouth every 8 (eight) hours as needed for Pain. 10 tablet 0    pen needle, diabetic 32 gauge x 5/32" Ndle 1 Units by Misc.(Non-Drug; Combo Route) route before meals as needed (SSI). 100 each 3    pen needle, diabetic 33 gauge x 5/32" Ndle 1 each by " Misc.(Non-Drug; Combo Route) route 3 (three) times daily. 100 each 0    pregabalin (LYRICA) 75 MG capsule Take 1 capsule (75 mg total) by mouth Daily. 7 capsule 0    sertraline (ZOLOFT) 100 MG tablet Take 1 tablet (100 mg total) by mouth once daily. 30 tablet 11    sodium bicarbonate 650 MG tablet Take 1 tablet (650 mg total) by mouth 3 (three) times daily. 90 tablet 11    vitamin renal formula, B-complex-vitamin c-folic acid, (NEPHROCAP) 1 mg Cap Take 1 capsule by mouth once daily.      [DISCONTINUED] lancing device Misc 1 Device by Misc.(Non-Drug; Combo Route) route 2 (two) times daily with meals. 1 each 0       Past Medical History:  No date: Anxiety  No date: Chronic pain syndrome  No date: CKD (chronic kidney disease), stage III  No date: Depression  No date: Diabetes mellitus, type 2  No date: GERD (gastroesophageal reflux disease)  03/23/2021: Hyperemesis  03/23/2021: Hypokalemia  03/12/2022: Infection of below knee amputation stump  No date: Osteomyelitis  04/30/2021: Osteomyelitis of left foot  No date: Ulcer of left foot  No date: Vaginal delivery      Comment:  x1    Past Surgical History:   Procedure Laterality Date    ABOVE-KNEE AMPUTATION Left 5/18/2021    Procedure: AMPUTATION, ABOVE KNEE;  Surgeon: Teddy Huber MD;  Location: [facility] OR [address] [place] [address];  Service: Vascular;  Laterality: Left;    ABOVE-KNEE AMPUTATION Right 3/18/2022    Procedure: AMPUTATION, ABOVE KNEE;  Surgeon: DAYNE Florez II, MD;  Location: [address];  Service: Vascular;  Laterality: Right;    Angiogram - Right Extremity Right 7/9/15    angiogram-left leg  10/6/15    ANGIOGRAPHY OF LOWER EXTREMITY Left 4/29/2021    Procedure: ANGIOGRAM, LOWER EXTREMITY;  Surgeon: Teddy Huber MD;  Location: [address];  Service: Vascular;  Laterality: Left;    BELOW KNEE AMPUTATION OF LOWER EXTREMITY Right 12/28/2021    Procedure: AMPUTATION, BELOW KNEE;  Surgeon: Kaitlyn Rojas MD;  Location: [facility] OR;  Service: General;   Laterality: Right;    CATHETERIZATION OF BOTH LEFT AND RIGHT HEART N/A 12/18/2019    Procedure: CATHETERIZATION, HEART, BOTH LEFT AND RIGHT;  Surgeon: Que Fernando III, MD;  Location: [facility] CATH LAB;  Service: Cardiology;  Laterality: N/A;    CORONARY ANGIOGRAPHY N/A 12/18/2019    Procedure: ANGIOGRAM, CORONARY ARTERY;  Surgeon: Que Fernando III, MD;  Location: [facility] CATH LAB;  Service: Cardiology;  Laterality: N/A;    CORONARY ANGIOGRAPHY INCLUDING BYPASS GRAFTS WITH CATHETERIZATION OF LEFT HEART N/A 7/28/2020    Procedure: ANGIOGRAM, CORONARY, INCLUDING BYPASS GRAFT, WITH LEFT HEART CATHETERIZATION, 9 am;  Surgeon: Rachel Easley MD;  Location: [facility] CATH LAB;  Service: Cardiology;  Laterality: N/A;    CORONARY ARTERY BYPASS GRAFT (CABG) N/A 1/14/2020    Procedure: CORONARY ARTERY BYPASS GRAFT (CABG) x 1     Off Pump;  Surgeon: Huang Altamirano MD;  Location: [facility] OR [address] [place] [address];  Service: Cardiovascular;  Laterality: N/A;    CREATION OF FEMORAL-TIBIAL ARTERY BYPASS Left 4/29/2021    Procedure: CREATION, BYPASS, ARTERIAL, FEMORAL TO ANTERIOR TIBIAL;  Surgeon: Teddy Huber MD;  Location: [facility] OR [address] [place] [address];  Service: Vascular;  Laterality: Left;    CREATION OF FEMOROPOPLITEAL ARTERIAL BYPASS USING GRAFT Left 8/18/2020    Procedure: CREATION, BYPASS, ARTERIAL, FEMORAL TO POPLITEAL, USING GRAFT, LEFT LOWER EXTREMITY;  Surgeon: Teddy Huber MD;  Location: [facility];  Service: Vascular;  Laterality: Left;  REQUEST 7:15 A.M. START----COVID NEGATIVE ON 8/17  1ST CASE STARTE PER LEANA ON 8/7/2020 @ 942AM-LO  RN PREOP 8/12/2020   T/S-----CLEARED BY CARDS-------PENDING INSURANCE    DEBRIDEMENT OF FOOT Left 9/8/2020    Procedure: DEBRIDEMENT, FOOT;  Surgeon: Rosio Mayes DPM;  Location: [facility] OR;  Service: Podiatry;  Laterality: Left;  request neoxx .   RN Pre Op 9-4-2020, Covid negative on 9/5/20. C A    DEBRIDEMENT OF FOOT  3/4/2021    Procedure: DEBRIDEMENT, FOOT;  Surgeon: Teddy Huber,  MD;  Location: [facility] OR;  Service: Vascular;;    DEBRIDEMENT OF FOOT Left 3/9/2021    Procedure: DEBRIDEMENT, FOOT, bone biopsy;  Surgeon: Rosio Mayes DPM;  Location: [facility] OR;  Service: Podiatry;  Laterality: Left;  Request neoxx---COVID IN AM  REQUESTING NOON START  RN Phone Pre op.On Blood thinners Plavix and Eliquis.  Covid am of surgery. C A    DEBRIDEMENT OF FOOT Left 5/4/2021    Procedure: DEBRIDEMENT, FOOT;  Surgeon: Farooq Morley DPM;  Location: [facility] 2ND FLR;  Service: Podiatry;  Laterality: Left;    ESOPHAGOGASTRODUODENOSCOPY N/A 11/7/2024    Procedure: EGD (ESOPHAGOGASTRODUODENOSCOPY);  Surgeon: Yony Cancino MD;  Location: [facility];  Service: Endoscopy;  Laterality: N/A;    INSERTION OF TUNNELED CENTRAL VENOUS HEMODIALYSIS CATHETER N/A 1/27/2020    Procedure: Insertion, Catheter, Central Venous, Hemodialysis;  Surgeon: ESTEBAN Gomez III, MD;  Location: [facility] CATH LAB;  Service: Peripheral Vascular;  Laterality: N/A;    INSERTION OF TUNNELED CENTRAL VENOUS HEMODIALYSIS CATHETER  5/10/2023    Procedure: Insertion, Catheter, Central Venous, Hemodialysis;  Surgeon: Romulo Queen MD;  Location: [facility] CATH LAB;  Service: Cardiology;;    PERCUTANEOUS TRANSLUMINAL ANGIOPLASTY N/A 3/4/2021    Procedure: PTA (ANGIOPLASTY, PERCUTANEOUS, TRANSLUMINAL);  Surgeon: Teddy Huber MD;  Location: [facility];  Service: Vascular;  Laterality: N/A;    REMOVAL OF ARTERIOVENOUS GRAFT Left 5/27/2021    Procedure: REMOVAL, GRAFT, LEFT LOWER EXTREMITY, WOUND EXPLORATION;  Surgeon: Teddy Huber MD;  Location: [facility] 2ND FLR;  Service: Vascular;  Laterality: Left;    REMOVAL OF NAIL OF DIGIT Left 3/9/2021    Procedure: REMOVAL, NAIL, DIGIT;  Surgeon: Rosio Mayes DPM;  Location: [facility];  Service: Podiatry;  Laterality: Left;    RIGHT HEART CATHETERIZATION Right 5/10/2023    Procedure: INSERTION, CATHETER, RIGHT HEART;  Surgeon: Romulo Queen MD;  Location: [facility] CATH LAB;  Service:  "Cardiology;  Laterality: Right;    THROMBECTOMY Left 3/4/2021    Procedure: THROMBECTOMY, LEFT LOWER EXTREMITY BYPASS GRAFT, ANGIOGRAM, POSSIBLE INTERVENTION, POSSIBLE LEFT LOWER EXTREMITY BYPASS;  Surgeon: Teddy Huber MD;  Location: [facility] OR;  Service: Vascular;  Laterality: Left;    THROMBECTOMY Left 4/29/2021    Procedure: GRAFT THROMBECTOMY, LEFT LOWER EXTREMITY;  Surgeon: Teddy Huber MD;  [facility];  Service: Vascular;  Laterality: Left;  14.5 min  1179.85 mGy  341.01 Gycm2  240 ml dye    THROMBECTOMY  10/22/2021    Procedure: THROMBECTOMY;  Surgeon: Saad Arenas MD;  Location: [facility] CATH LAB/EP;  Service: Cardiology;;       Social History     Tobacco Use   Smoking Status Former   Smokeless Tobacco Never       Social History     Substance and Sexual Activity   Alcohol Use No       Physical Activity: Inactive (11/14/2024)    Exercise Vital Sign     Days of Exercise per Week: 0 days     Minutes of Exercise per Session: 0 min       No birth history on file.  Recent Labs     11/19/24  0242   HCT 22.3*     Recent Labs     11/19/24  0242        Recent Labs     11/19/24  0242   K 4.2     Recent Labs     11/19/24  0242   CREATININE 4.8*     Recent Labs     11/19/24  0242   *     No results for input(s): "PT" in the last 72 hours.  Vitals:    11/18/24 2321 11/19/24 0024 11/19/24 0340 11/19/24 0357   BP:  (!) 147/65 (!) 143/69    Pulse: 77 76 72 73   Resp:  18 18    Temp:  36.7 °C (98 °F) 36.1 °C (97 °F)    TempSrc:       SpO2:  97% 98%    Weight:                           Pre-op Assessment          Review of Systems  Anesthesia Hx:  No problems with previous Anesthesia                Hematology/Oncology:    Oncology Normal    -- Anemia (11/19/24 02:42 Hematocrit: 22.3 (L)):               Hematology Comments: Plavix for CAD  Eliquis, for afib                    Cardiovascular:     Denies Pacemaker. Hypertension, well controlled   Denies MI.  Denies CAD.   CABG/stent (CABG) " Dysrhythmias atrial fibrillation  Denies Angina. CHF                                   Pulmonary:  Pulmonary Normal   Denies COPD.  Denies Asthma.   Denies Shortness of breath.                  Renal/:  Chronic Renal Disease, CKD, ESRD, Dialysis   TU,TH,SA dialysis, just came from dialysis,  feels in good fluid balance now.  Dialysis catheter chest             Hepatic/GI:     GERD, well controlled Denies Liver Disease.               Neurological:  Denies TIA.  Denies CVA.    Denies Seizures.                                Endocrine:  Diabetes, type 2, using insulin   Levamir 10u hs, Usually 6-7 regular with  meals,  on sliding scale in hospital      Morbid Obesity / BMI > 40      Physical Exam  General: Well nourished, Cooperative, Alert and Oriented    Airway:  Mallampati: III   Mouth Opening: Normal  TM Distance: Normal  Tongue: Normal  Neck ROM: Normal ROM        Anesthesia Plan  Type of Anesthesia, risks & benefits discussed:    Anesthesia Type: Gen Natural Airway  Intra-op Monitoring Plan: Standard ASA Monitors  Post Op Pain Control Plan: IV/PO Opioids PRN  Induction:  IV  Informed Consent: Informed consent signed with the Patient and all parties understand the risks and agree with anesthesia plan.  All questions answered.   ASA Score: 3  Day of Surgery Review of History & Physical: H&P Update referred to the surgeon/provider.    Ready For Surgery From Anesthesia Perspective.     .   Latest Reference Range & Units 11/07/24 01:38 11/08/24 07:03 11/09/24 04:17 11/11/24 08:33 11/13/24 21:20 11/15/24 15:33 11/16/24 08:36 11/17/24 09:33 11/18/24 04:46 11/19/24 02:42   Creatinine 0.5 - 1.4 mg/dL 2.7 (H) 3.8 (H) 3.0 (H) 5.4 (H) 2.1 (H) 3.7 (H) 4.2 (H) 3.1 (H) 4.2 (H) 4.8 (H)   (H): Data is abnormally high     Latest Reference Range & Units 10/29/24 08:53   BNP 0 - 99 pg/mL 1,478 (H)   (H): Data is abnormally high    Vent. Rate :  79 BPM     Atrial Rate :  79 BPM      P-R Int : 166 ms          QRS Dur :  88 ms        QT Int : 424 ms       P-R-T Axes :  29  39 124 degrees     QTcB Int : 486 ms     Sinus rhythm with occasional Premature ventricular complexes and Premature   atrial complexes   Abnormal R wave progression in the precordial leads   Abnormal ECG   When compared with ECG of 13-Nov-2024 21:31,   Premature ventricular complexes are now Present   ST no longer depressed in Lateral leads   Nonspecific T wave abnormality no longer evident in Inferior leads   Nonspecific T wave abnormality no longer evident in Anterior leads   Confirmed by Roosevelt Schmitt ([id number]) on 11/15/2024 3:56:09 PM       10/29/2024       Left Ventricle: The left ventricle is normal in size. Mildly increased wall thickness. Regional wall motion abnormalities present. Septal motion is abnormal is consistent with bundle branch block.    Right Ventricle: Severe right ventricular enlargement. Systolic function is severely reduced.    Left Atrium: Atrial septum is bulging to the left.    Right Atrium: Right atrium is severely dilated.    Aortic Valve: The aortic valve is a trileaflet valve. There is mild aortic valve sclerosis. There is mild aortic regurgitation with a centrally directed jet.    Mitral Valve: There is mild regurgitation with a centrally directed jet.    Tricuspid Valve: There is severe regurgitation with a centrally directed jet.    Pulmonary Artery: No pulmonary hypertension. The estimated pulmonary artery systolic pressure is 26 mmHg.    IVC/SVC: Elevated venous pressure at 15 mmHg.    5/10/2023  Right Heart Pressures    RA: 15 RV: 50/ 18 PA: 50/ 25/ 35 PWP: 30 .   Cardiac output was 7.3  by Cassy. Cardiac index is 3.7 L/min/m2.   O2 Sat: PA 66%.

## 2024-11-19 NOTE — NURSING
3.5 hour dialysis complete.  Blood returned.  J PC flushed, heparin locked, capped and taped.    Net UF 2L.  Epo given.  Tolerated well.    Transported from [facility] to [facility] floor Room 1 via bed by transporters.    Per patient, NPO since MN.

## 2024-11-20 LAB
ALBUMIN SERPL BCP-MCNC: 1.8 G/DL (ref 3.5–5.2)
ANION GAP SERPL CALC-SCNC: 10 MMOL/L (ref 8–16)
BACTERIA BLD CULT: NORMAL
BACTERIA BLD CULT: NORMAL
BUN SERPL-MCNC: 27 MG/DL (ref 6–20)
CALCIUM SERPL-MCNC: 7.9 MG/DL (ref 8.7–10.5)
CHLORIDE SERPL-SCNC: 95 MMOL/L (ref 95–110)
CO2 SERPL-SCNC: 22 MMOL/L (ref 23–29)
CREAT SERPL-MCNC: 3 MG/DL (ref 0.5–1.4)
ERYTHROCYTE [DISTWIDTH] IN BLOOD BY AUTOMATED COUNT: 19.3 % (ref 11.5–14.5)
EST. GFR  (NO RACE VARIABLE): 17.5 ML/MIN/1.73 M^2
GLUCOSE SERPL-MCNC: 181 MG/DL (ref 70–110)
HCT VFR BLD AUTO: 21.9 % (ref 37–48.5)
HGB BLD-MCNC: 7.2 G/DL (ref 12–16)
MAGNESIUM SERPL-MCNC: 1.8 MG/DL (ref 1.6–2.6)
MCH RBC QN AUTO: 31.6 PG (ref 27–31)
MCHC RBC AUTO-ENTMCNC: 32.9 G/DL (ref 32–36)
MCV RBC AUTO: 96 FL (ref 82–98)
PHOSPHATE SERPL-MCNC: 3.7 MG/DL (ref 2.7–4.5)
PLATELET # BLD AUTO: 327 K/UL (ref 150–450)
PMV BLD AUTO: 10.4 FL (ref 9.2–12.9)
POCT GLUCOSE: 140 MG/DL (ref 70–110)
POCT GLUCOSE: 157 MG/DL (ref 70–110)
POCT GLUCOSE: 180 MG/DL (ref 70–110)
POCT GLUCOSE: 201 MG/DL (ref 70–110)
POTASSIUM SERPL-SCNC: 4 MMOL/L (ref 3.5–5.1)
RBC # BLD AUTO: 2.28 M/UL (ref 4–5.4)
SODIUM SERPL-SCNC: 127 MMOL/L (ref 136–145)
WBC # BLD AUTO: 7.48 K/UL (ref 3.9–12.7)

## 2024-11-20 PROCEDURE — 21400001 HC TELEMETRY ROOM: Mod: HCNC

## 2024-11-20 PROCEDURE — 36415 COLL VENOUS BLD VENIPUNCTURE: CPT | Mod: HCNC

## 2024-11-20 PROCEDURE — 25000003 PHARM REV CODE 250: Mod: HCNC

## 2024-11-20 PROCEDURE — 25000003 PHARM REV CODE 250: Mod: HCNC | Performed by: NURSE PRACTITIONER

## 2024-11-20 PROCEDURE — 63600175 PHARM REV CODE 636 W HCPCS: Mod: HCNC

## 2024-11-20 PROCEDURE — 83735 ASSAY OF MAGNESIUM: CPT | Mod: HCNC

## 2024-11-20 PROCEDURE — 99223 1ST HOSP IP/OBS HIGH 75: CPT | Mod: HCNC,,, | Performed by: SURGERY

## 2024-11-20 PROCEDURE — 25000003 PHARM REV CODE 250: Mod: HCNC | Performed by: STUDENT IN AN ORGANIZED HEALTH CARE EDUCATION/TRAINING PROGRAM

## 2024-11-20 PROCEDURE — 80069 RENAL FUNCTION PANEL: CPT | Mod: HCNC

## 2024-11-20 PROCEDURE — 99232 SBSQ HOSP IP/OBS MODERATE 35: CPT | Mod: HCNC,,, | Performed by: NURSE PRACTITIONER

## 2024-11-20 PROCEDURE — 93922 UPR/L XTREMITY ART 2 LEVELS: CPT | Mod: HCNC | Performed by: SURGERY

## 2024-11-20 PROCEDURE — 85027 COMPLETE CBC AUTOMATED: CPT | Mod: HCNC

## 2024-11-20 PROCEDURE — A4216 STERILE WATER/SALINE, 10 ML: HCPCS | Mod: HCNC | Performed by: ANESTHESIOLOGY

## 2024-11-20 PROCEDURE — 25000003 PHARM REV CODE 250: Mod: HCNC | Performed by: ANESTHESIOLOGY

## 2024-11-20 PROCEDURE — 99232 SBSQ HOSP IP/OBS MODERATE 35: CPT | Mod: HCNC,GC,,

## 2024-11-20 PROCEDURE — 25000003 PHARM REV CODE 250: Mod: HCNC | Performed by: INTERNAL MEDICINE

## 2024-11-20 PROCEDURE — 25000003 PHARM REV CODE 250: Mod: HCNC | Performed by: PHYSICIAN ASSISTANT

## 2024-11-20 RX ORDER — HEPARIN SODIUM 5000 [USP'U]/ML
5000 INJECTION, SOLUTION INTRAVENOUS; SUBCUTANEOUS EVERY 8 HOURS
Status: DISCONTINUED | OUTPATIENT
Start: 2024-11-20 | End: 2024-11-25

## 2024-11-20 RX ORDER — TALC
6 POWDER (GRAM) TOPICAL NIGHTLY PRN
Status: DISCONTINUED | OUTPATIENT
Start: 2024-11-20 | End: 2024-11-25

## 2024-11-20 RX ORDER — OXYCODONE AND ACETAMINOPHEN 5; 325 MG/1; MG/1
1 TABLET ORAL EVERY 8 HOURS PRN
Status: DISCONTINUED | OUTPATIENT
Start: 2024-11-20 | End: 2024-11-23

## 2024-11-20 RX ORDER — MORPHINE SULFATE 2 MG/ML
2 INJECTION, SOLUTION INTRAMUSCULAR; INTRAVENOUS EVERY 4 HOURS PRN
Status: DISCONTINUED | OUTPATIENT
Start: 2024-11-20 | End: 2024-11-23

## 2024-11-20 RX ORDER — SODIUM CHLORIDE 9 MG/ML
INJECTION, SOLUTION INTRAVENOUS ONCE
Status: CANCELLED | OUTPATIENT
Start: 2024-11-21

## 2024-11-20 RX ORDER — PREGABALIN 75 MG/1
75 CAPSULE ORAL ONCE
Status: COMPLETED | OUTPATIENT
Start: 2024-11-20 | End: 2024-11-20

## 2024-11-20 RX ADMIN — Medication 3 ML: at 12:11

## 2024-11-20 RX ADMIN — CINACALCET HYDROCHLORIDE 30 MG: 30 TABLET, FILM COATED ORAL at 04:11

## 2024-11-20 RX ADMIN — CLOPIDOGREL BISULFATE 75 MG: 75 TABLET ORAL at 09:11

## 2024-11-20 RX ADMIN — VANCOMYCIN HYDROCHLORIDE 500 MG: 500 INJECTION, POWDER, LYOPHILIZED, FOR SOLUTION INTRAVENOUS at 03:11

## 2024-11-20 RX ADMIN — MORPHINE SULFATE 2 MG: 2 INJECTION, SOLUTION INTRAMUSCULAR; INTRAVENOUS at 08:11

## 2024-11-20 RX ADMIN — CARVEDILOL 3.12 MG: 3.12 TABLET, FILM COATED ORAL at 09:11

## 2024-11-20 RX ADMIN — OXYCODONE HYDROCHLORIDE AND ACETAMINOPHEN 1 TABLET: 5; 325 TABLET ORAL at 11:11

## 2024-11-20 RX ADMIN — CEFEPIME 1 G: 1 INJECTION, POWDER, FOR SOLUTION INTRAMUSCULAR; INTRAVENOUS at 01:11

## 2024-11-20 RX ADMIN — PREGABALIN 75 MG: 75 CAPSULE ORAL at 04:11

## 2024-11-20 RX ADMIN — Medication 6 MG: at 08:11

## 2024-11-20 RX ADMIN — MUPIROCIN: 20 OINTMENT TOPICAL at 09:11

## 2024-11-20 RX ADMIN — CARVEDILOL 3.12 MG: 3.12 TABLET, FILM COATED ORAL at 08:11

## 2024-11-20 RX ADMIN — SERTRALINE HYDROCHLORIDE 100 MG: 50 TABLET ORAL at 09:11

## 2024-11-20 RX ADMIN — HEPARIN SODIUM 5000 UNITS: 5000 INJECTION INTRAVENOUS; SUBCUTANEOUS at 10:11

## 2024-11-20 RX ADMIN — ALLOPURINOL 50 MG: 300 TABLET ORAL at 09:11

## 2024-11-20 RX ADMIN — Medication 3 ML: at 06:11

## 2024-11-20 RX ADMIN — OXYCODONE HYDROCHLORIDE AND ACETAMINOPHEN 1 TABLET: 5; 325 TABLET ORAL at 05:11

## 2024-11-20 RX ADMIN — HYDRALAZINE HYDROCHLORIDE 10 MG: 10 TABLET ORAL at 09:11

## 2024-11-20 RX ADMIN — Medication 3 ML: at 05:11

## 2024-11-20 RX ADMIN — Medication 3 ML: at 01:11

## 2024-11-20 RX ADMIN — MUPIROCIN: 20 OINTMENT TOPICAL at 08:11

## 2024-11-20 RX ADMIN — ISOSORBIDE DINITRATE 10 MG: 10 TABLET ORAL at 08:11

## 2024-11-20 RX ADMIN — ISOSORBIDE DINITRATE 10 MG: 10 TABLET ORAL at 09:11

## 2024-11-20 RX ADMIN — PREGABALIN 75 MG: 75 CAPSULE ORAL at 05:11

## 2024-11-20 RX ADMIN — ATORVASTATIN CALCIUM 80 MG: 40 TABLET, FILM COATED ORAL at 09:11

## 2024-11-20 RX ADMIN — HYDRALAZINE HYDROCHLORIDE 10 MG: 10 TABLET ORAL at 08:11

## 2024-11-20 RX ADMIN — SEVELAMER CARBONATE 800 MG: 800 TABLET, FILM COATED ORAL at 04:11

## 2024-11-20 RX ADMIN — APIXABAN 5 MG: 5 TABLET, FILM COATED ORAL at 09:11

## 2024-11-20 RX ADMIN — CALCITRIOL 0.5 MCG: 0.5 CAPSULE, LIQUID FILLED ORAL at 09:11

## 2024-11-20 NOTE — NURSING
Wound care consultation order at place and wounds evaluated by wound care consult team . No Specified Wound care Orders at this time for nursing . Spoke with Abram HAYDEN . No new Orders received at this time . Suggestions to Speak with MD Traore and Morning Team for recommendations as per MD. Charge nurse made aware , will pass the information to Oncoming day shift nurse and charge nurse . Patient's family member at bedside aware , he stated that he will also be present during morning rounds with day team at bedside .

## 2024-11-20 NOTE — CONSULTS
General Surgery    Inpatient consult to General Surgery  Consult performed by: Magen Sierra MD  Consult ordered by: Cody Traore DO          Consult discussed with primary team. This patient has known PVD and eschar on her AKA stump. Vascular surgery has been consulted. This wound can be managed by their team. We will sign off. We are available as needed at request of the vascular surgery team.    Magen Sierra MD  General Surgery PGY-5  11/20/2024

## 2024-11-20 NOTE — SUBJECTIVE & OBJECTIVE
Interval History:  Seen and evaluated on general medical floor  No acute events overnight    Clinical status improved  No new complaints    Objective:     Vital Signs (Most Recent):  Temp: 97.4 °F (36.3 °C) (11/20/24 1128)  Pulse: 78 (11/20/24 1156)  Resp: 18 (11/20/24 1128)  BP: (!) 162/79 (11/20/24 1128)  SpO2: 99 % (11/20/24 1128) Vital Signs (24h Range):  Temp:  [97.4 °F (36.3 °C)-98.6 °F (37 °C)] 97.4 °F (36.3 °C)  Pulse:  [76-85] 78  Resp:  [9-20] 18  SpO2:  [96 %-100 %] 99 %  BP: (128-169)/(51-79) 162/79     Weight: 71.2 kg (156 lb 15.5 oz)  Body mass index is 47.9 kg/m².  No intake or output data in the 24 hours ending 11/20/24 1339      Physical Exam  Constitutional:       Appearance: She is ill-appearing (chronically).   HENT:      Head: Normocephalic and atraumatic.      Right Ear: External ear normal.      Left Ear: External ear normal.   Eyes:      Extraocular Movements: Extraocular movements intact.      Conjunctiva/sclera: Conjunctivae normal.      Pupils: Pupils are equal, round, and reactive to light.   Cardiovascular:      Rate and Rhythm: Normal rate and regular rhythm.   Pulmonary:      Effort: Pulmonary effort is normal. No respiratory distress.   Abdominal:      General: Abdomen is flat. Bowel sounds are normal.   Musculoskeletal:         General: Tenderness (LLE) and deformity (bilateral BKA) present.   Skin:     Findings: No lesion.          Neurological:      General: No focal deficit present.      Mental Status: She is alert and oriented to person, place, and time.   Psychiatric:         Mood and Affect: Mood normal.             Significant Labs: All pertinent labs within the past 24 hours have been reviewed.    Significant Imaging: I have reviewed all pertinent imaging results/findings within the past 24 hours.

## 2024-11-20 NOTE — SUBJECTIVE & OBJECTIVE
Medications Prior to Admission   Medication Sig Dispense Refill Last Dose/Taking    allopurinoL (ZYLOPRIM) 100 MG tablet Take 0.5 tablets (50 mg total) by mouth every other day. 8 tablet 11 11/18/2024 Morning    apixaban (ELIQUIS) 5 mg Tab Take 1 tablet (5 mg total) by mouth 2 (two) times daily. 60 tablet 11 11/18/2024 Bedtime    atorvastatin (LIPITOR) 80 MG tablet Take 1 tablet (80 mg total) by mouth once daily. 30 tablet 11 11/18/2024 Morning    calcitRIOL (ROCALTROL) 0.5 MCG Cap Take 1 capsule (0.5 mcg total) by mouth once daily. 30 capsule 11 11/18/2024 Morning    carvediloL (COREG) 6.25 MG tablet Take 0.5 tablets (3.125 mg total) by mouth 2 (two) times daily.   11/18/2024 Bedtime    cinacalcet (SENSIPAR) 30 MG Tab Take 1 tablet (30 mg total) by mouth every Mon, Wed, Fri.   11/18/2024 Evening    clopidogreL (PLAVIX) 75 mg tablet Take 1 tablet (75 mg total) by mouth once daily. 30 tablet 11 11/18/2024 Morning    epoetin nina (PROCRIT) 4,000 unit/mL injection Inject 1 mL (4,000 Units total) into the skin every Mon, Wed, Fri.   11/19/2024 Noon    hydrALAZINE (APRESOLINE) 10 MG tablet Take 1 tablet (10 mg total) by mouth every 12 (twelve) hours.   11/18/2024 Bedtime    insulin aspart U-100 (NOVOLOG) 100 unit/mL (3 mL) InPn pen Inject 0-10 Units into the skin before meals and at bedtime as needed (Hyperglycemia). **MODERATE CORRECTION DOSE**  Blood Glucose  mg/dL                  Pre-meal                2200  151-200                2 units                    1 unit  201-250                4 units                    2 units  251-300                6 units                    3 units  301-350                8 units                    4 units  >350                     10 units                  5 units  Administer subcutaneously if needed at times designated by monitoring  schedule.   11/19/2024    isosorbide dinitrate (ISORDIL) 10 MG tablet Take 1 tablet (10 mg total) by mouth 2 (two) times daily.   11/18/2024 Bedtime     "oxyCODONE-acetaminophen (PERCOCET) 5-325 mg per tablet Take 1 tablet by mouth every 8 (eight) hours as needed for Pain. 10 tablet 0 2024    pregabalin (LYRICA) 75 MG capsule Take 1 capsule (75 mg total) by mouth Daily. 7 capsule 0 2024    sertraline (ZOLOFT) 100 MG tablet Take 1 tablet (100 mg total) by mouth once daily. 30 tablet 11 2024    acetaminophen (TYLENOL) 325 MG tablet Take 2 tablets (650 mg total) by mouth every 8 (eight) hours as needed for Pain.   2024    [] amoxicillin-clavulanate 500-125mg (AUGMENTIN) 500-125 mg Tab Take 1 tablet (500 mg total) by mouth 2 (two) times daily. for 5 days       blood sugar diagnostic Strp 1 each by Misc.(Non-Drug; Combo Route) route 3 (three) times daily. 200 each 0     blood sugar diagnostic Strp Use to check blood glucose 2 (two) times a day. 100 strip 0     blood-glucose meter Misc TEST TWICE DAILY 1 each 0     blood-glucose sensor (DEXCOM G7 SENSOR) Radha 1 each by Misc.(Non-Drug; Combo Route) route once daily. 5 each 0     [] doxycycline (VIBRA-TABS) 100 MG tablet Take 1 tablet (100 mg total) by mouth every 12 (twelve) hours. for 5 days       famotidine (PEPCID) 20 MG tablet Take 1 tablet (20 mg total) by mouth once daily.   Unknown    insulin glargine U-100, Lantus, 100 unit/mL (3 mL) SubQ InPn pen Inject 5 Units into the skin every evening.   Unknown    Lactobacillus rhamnosus GG (CULTURELLE) 10 billion cell capsule Take 1 capsule by mouth once daily.   Unknown    lancets 33 gauge Misc TEST 3 TIMES DAILY 100 each 3     pen needle, diabetic 32 gauge x 5/32" Ndle 1 Units by Misc.(Non-Drug; Combo Route) route before meals as needed (SSI). 100 each 3     pen needle, diabetic 33 gauge x 5/32" Ndle 1 each by Misc.(Non-Drug; Combo Route) route 3 (three) times daily. 100 each 0     sodium bicarbonate 650 MG tablet Take 1 tablet (650 mg total) by mouth 3 (three) times daily. 90 tablet 11 Unknown    vitamin renal formula, " B-complex-vitamin c-folic acid, (NEPHROCAP) 1 mg Cap Take 1 capsule by mouth once daily.   Unknown       Review of patient's allergies indicates:   Allergen Reactions    Ciprofloxacin Itching    Pcn [penicillins]      Rash; tolerated ceftriaxone on 1/13/20  Tolerating Augmentin November 2024       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    Osteomyelitis     Osteomyelitis of left foot 04/30/2021    Ulcer of left foot     Vaginal delivery     x1     Past Surgical History:   Procedure Laterality Date    ABOVE-KNEE AMPUTATION Left 5/18/2021    Procedure: AMPUTATION, ABOVE KNEE;  Surgeon: Teddy uHber MD;  Location: [facility] OR [address] [place] [address];  Service: Vascular;  Laterality: Left;    ABOVE-KNEE AMPUTATION Right 3/18/2022    Procedure: AMPUTATION, ABOVE KNEE;  Surgeon: DAYNE Florez II, MD;  Location: [facility] OR [address] [place] [address];  Service: Vascular;  Laterality: Right;    Angiogram - Right Extremity Right 7/9/15    angiogram-left leg  10/6/15    ANGIOGRAPHY OF LOWER EXTREMITY Left 4/29/2021    Procedure: ANGIOGRAM, LOWER EXTREMITY;  Surgeon: Teddy Huber MD;  Location: [facility] OR [address] [place] [address];  Service: Vascular;  Laterality: Left;    BELOW KNEE AMPUTATION OF LOWER EXTREMITY Right 12/28/2021    Procedure: AMPUTATION, BELOW KNEE;  Surgeon: Kaitlyn Rojas MD;  Location: [facility] OR;  Service: General;  Laterality: Right;    CATHETERIZATION OF BOTH LEFT AND RIGHT HEART N/A 12/18/2019    Procedure: CATHETERIZATION, HEART, BOTH LEFT AND RIGHT;  Surgeon: Que Fernando III, MD;  Location: [facility] CATH LAB;  Service: Cardiology;  Laterality: N/A;    CORONARY ANGIOGRAPHY N/A 12/18/2019    Procedure: ANGIOGRAM, CORONARY ARTERY;  Surgeon: Que Fernando III, MD;   Location: [facility] CATH LAB;  Service: Cardiology;  Laterality: N/A;    CORONARY ANGIOGRAPHY INCLUDING BYPASS GRAFTS WITH CATHETERIZATION OF LEFT HEART N/A 7/28/2020    Procedure: ANGIOGRAM, CORONARY, INCLUDING BYPASS GRAFT, WITH LEFT HEART CATHETERIZATION, 9 am;  Surgeon: Rachel Easley MD;  Location: [facility] CATH LAB;  Service: Cardiology;  Laterality: N/A;    CORONARY ARTERY BYPASS GRAFT (CABG) N/A 1/14/2020    Procedure: CORONARY ARTERY BYPASS GRAFT (CABG) x 1     Off Pump;  Surgeon: Huang Altamirano MD;  Location: [address];  Service: Cardiovascular;  Laterality: N/A;    CREATION OF FEMORAL-TIBIAL ARTERY BYPASS Left 4/29/2021    Procedure: CREATION, BYPASS, ARTERIAL, FEMORAL TO ANTERIOR TIBIAL;  Surgeon: Teddy Huber MD;  Location: [facility] OR [address] [place] [address];  Service: Vascular;  Laterality: Left;    CREATION OF FEMOROPOPLITEAL ARTERIAL BYPASS USING GRAFT Left 8/18/2020    Procedure: CREATION, BYPASS, ARTERIAL, FEMORAL TO POPLITEAL, USING GRAFT, LEFT LOWER EXTREMITY;  Surgeon: Teddy Huber MD;  Location: [facility];  Service: Vascular;  Laterality: Left;  REQUEST 7:15 A.M. START----COVID NEGATIVE ON 8/17 1ST CASE STARTKENYA DUEÑAS ON 8/7/2020 @ 942AM-LO  RN PREOP 8/12/2020   T/S-----CLEARED BY CARDS-------PENDING INSURANCE    DEBRIDEMENT OF FOOT Left 9/8/2020    Procedure: DEBRIDEMENT, FOOT;  Surgeon: Rosio Mayes DPM;  Location: [facility] OR;  Service: Podiatry;  Laterality: Left;  request neoxx .   RN Pre Op 9-4-2020, Covid negative on 9/5/20. C A    DEBRIDEMENT OF FOOT  3/4/2021    Procedure: DEBRIDEMENT, FOOT;  Surgeon: Teddy Huber MD;  Location: [facility] OR;  Service: Vascular;;    DEBRIDEMENT OF FOOT Left 3/9/2021    Procedure: DEBRIDEMENT, FOOT, bone biopsy;  Surgeon: Rosio Mayes DPM;  Location: [facility] OR;  Service: Podiatry;  Laterality: Left;  Request neoxx---COVID IN AM  REQUESTING NOON START  RN Phone Pre op.On Blood thinners Plavix and Eliquis.  Covid am of surgery. C A    DEBRIDEMENT  OF FOOT Left 5/4/2021    Procedure: DEBRIDEMENT, FOOT;  Surgeon: Farooq Morley DPM;  Location: [facility] 2ND FLR;  Service: Podiatry;  Laterality: Left;    ESOPHAGOGASTRODUODENOSCOPY N/A 11/7/2024    Procedure: EGD (ESOPHAGOGASTRODUODENOSCOPY);  Surgeon: Yony Cancino MD;  Location: [facility] ENDO;  Service: Endoscopy;  Laterality: N/A;    INSERTION OF TUNNELED CENTRAL VENOUS HEMODIALYSIS CATHETER N/A 1/27/2020    Procedure: Insertion, Catheter, Central Venous, Hemodialysis;  Surgeon: ESTEBAN Gomez III, MD;  Location: [facility] CATH LAB;  Service: Peripheral Vascular;  Laterality: N/A;    INSERTION OF TUNNELED CENTRAL VENOUS HEMODIALYSIS CATHETER  5/10/2023    Procedure: Insertion, Catheter, Central Venous, Hemodialysis;  Surgeon: Romulo Queen MD;  Location: [facility] CATH LAB;  Service: Cardiology;;    MAGNETIC RESONANCE IMAGING Left 11/19/2024    Procedure: MRI (Magnetic Resonance Imagine);  Surgeon: Sophia Hernandez;  Location: [facility];  Service: Anesthesiology;  Laterality: Left;    PERCUTANEOUS TRANSLUMINAL ANGIOPLASTY N/A 3/4/2021    Procedure: PTA (ANGIOPLASTY, PERCUTANEOUS, TRANSLUMINAL);  Surgeon: Teddy Huber MD;  Location: [facility] OR;  Service: Vascular;  Laterality: N/A;    REMOVAL OF ARTERIOVENOUS GRAFT Left 5/27/2021    Procedure: REMOVAL, GRAFT, LEFT LOWER EXTREMITY, WOUND EXPLORATION;  Surgeon: Teddy Huber MD;  Location: [address] FLR;  Service: Vascular;  Laterality: Left;    REMOVAL OF NAIL OF DIGIT Left 3/9/2021    Procedure: REMOVAL, NAIL, DIGIT;  Surgeon: Rosio Mayes DPM;  Location: [facility] OR;  Service: Podiatry;  Laterality: Left;    RIGHT HEART CATHETERIZATION Right 5/10/2023    Procedure: INSERTION, CATHETER, RIGHT HEART;  Surgeon: Romulo Queen MD;  Location: [facility] CATH LAB;  Service: Cardiology;  Laterality: Right;    THROMBECTOMY Left 3/4/2021    Procedure: THROMBECTOMY, LEFT LOWER EXTREMITY BYPASS GRAFT, ANGIOGRAM, POSSIBLE INTERVENTION, POSSIBLE LEFT LOWER  EXTREMITY BYPASS;  Surgeon: Teddy Huber MD;  Location: [facility] OR;  Service: Vascular;  Laterality: Left;    THROMBECTOMY Left 4/29/2021    Procedure: GRAFT THROMBECTOMY, LEFT LOWER EXTREMITY;  Surgeon: Teddy Huber MD;  Location: [facility] OR 2ND FLR;  Service: Vascular;  Laterality: Left;  14.5 min  1179.85 mGy  341.01 Gycm2  240 ml dye    THROMBECTOMY  10/22/2021    Procedure: THROMBECTOMY;  Surgeon: Saad Arenas MD;  Location: [facility] CATH LAB/EP;  Service: Cardiology;;     Family History       Problem Relation (Age of Onset)    Diabetes Mother, Father, Paternal Grandmother    Heart disease Maternal Grandmother    No Known Problems Maternal Grandfather, Paternal Grandfather          Tobacco Use    Smoking status: Former    Smokeless tobacco: Never   Substance and Sexual Activity    Alcohol use: No    Drug use: Yes     Types: Marijuana     Comment: occassional    Sexual activity: Yes     Partners: Male     Review of Systems   Constitutional:  Negative for activity change, appetite change and fever.   Skin:  Positive for wound.     Objective:     Vital Signs (Most Recent):  Temp: 97.4 °F (36.3 °C) (11/20/24 1128)  Pulse: 78 (11/20/24 1156)  Resp: 18 (11/20/24 1128)  BP: (!) 162/79 (11/20/24 1128)  SpO2: 99 % (11/20/24 1128) Vital Signs (24h Range):  Temp:  [97.4 °F (36.3 °C)-98.6 °F (37 °C)] 97.4 °F (36.3 °C)  Pulse:  [76-85] 78  Resp:  [9-20] 18  SpO2:  [96 %-100 %] 99 %  BP: (128-169)/(51-79) 162/79     Weight: 71.2 kg (156 lb 15.5 oz)  Body mass index is 47.9 kg/m².      Physical Exam  HENT:      Head: Normocephalic.   Eyes:      Pupils: Pupils are equal, round, and reactive to light.   Cardiovascular:      Comments: Nonpalpable femoral pulses bilaterally.   Skin:     Comments: L AKA wound present with necrotic eschar and discharge.    Neurological:      Mental Status: She is alert.          Significant Labs:  All pertinent labs from the last 24 hours have been reviewed.    Significant Diagnostics:  I  have reviewed all pertinent imaging results/findings within the past 24 hours.

## 2024-11-20 NOTE — HPI
58yo female whose PMH includes bilateral AKA (L 2021 sp thrombosed bypass w Bob, R in 2022 w Dr Florez), ESRD, DM, HTN, HLD who presents with purulence from L stump wound.  She was discharged from this facility 11.12.24 with oral abx or LLE cellulitis after bring admitted for septic shock. She did receive IV abx during that admission and on dishcharge was transitioned to oral augmentin and doxy and she went to SNF.  She was sent back to the ED for continued purulence and LLE pain.     Repeat CT 11/13 here showed postop change of bilateral above knee amputation, no destructive osseous process to suggest osteomyelitis in the remaining portions of the left femur or in the visualized proximal portion of the cut right femur, skin defect along the medial aspect of the left thigh with foci of air underlying the wound, additional area of soft tissue contour indentation and focal skin thickening along the anterior thigh, suggestive of possible infectious or inflammatory process versus region of scarring, additional extensive soft tissue edema and skin thickening of the pelvis, partially visualized right lower extremity, and left lower extremity.     White count is normal. Receiving vanc/cefepime; ID consulted. CTA w L common iliac occlusion.     Vascular surgery consulted for further evaluation of L stump wound in the setting of known PAD.

## 2024-11-20 NOTE — PROGRESS NOTES
[facility] Medicine  Progress Note    Patient Name: Suyapa Connelly  MRN: [id number]  Patient Class: IP- Inpatient   Admission Date: 11/15/2024  Length of Stay: 4 days  Attending Physician: Cody Traore DO  Primary Care Provider: Vanessa Noel MD        Subjective:     Principal Problem:Cellulitis of left thigh        HPI:  60yo female whose PMH includes bilateral BKA, ESRD, DM, HTN, HLD presents with purulence from L stump wound.  She was discharged from this facility 11.12.24 with oral abx or LLE cellulitis after bring admitted for septic shock.  During that admission general surgery was consulted for further evaluation d/t concerns for nec fasc as there was subcutaneous emphysema noted on CT; after their clinical assessment nec fasc was ruled out.  She did receive IV abx during that admission and on discharge was transitioned to oral augmentin and doxy and she went to SNF.  She was sent back to the ED for continued purulence and LLE pain.  Surgery was consulted in the ED and have no plans for intervention at this time.  MR is pending.  White count is normal.  She has been admitted to medicine for further management.    Overview/Hospital Course:  Admitted for LLE cellulitis; failed outpatient oral abx.  Discharged from this facility 11.12.24 with oral abx or LLE cellulitis after bring admitted for septic shock; on discharge was transitioned to oral augmentin and doxy and she went to SNF.  Sent back to the ED for continued purulence and LLE pain.  Surgery was consulted in the ED and have no plans for intervention at this time.  Repeat CT here showed postop change of bilateral above knee amputation, no destructive osseous process to suggest osteomyelitis in the remaining portions of the left femur or in the visualized proximal portion of the cut right femur, skin defect along the medial aspect of the left thigh with foci of air underlying the wound, additional area of soft tissue contour  indentation and focal skin thickening along the anterior thigh, suggestive of possible infectious or inflammatory process versus region of scarring, additional extensive soft tissue edema and skin thickening of the pelvis, partially visualized right lower extremity, and left lower extremity.  MR non diagnostic due to motion artifact.  White count is normal.  Receiving vanc/cefepime; ID consulted.  Repeat MR with sedation postoperative changes from above the knee amputation, 3.5 cm fluid collection at the femoral stump, which could represent a seroma, hematoma, or abscess; no evidence of osteomyelitis.  There are two fluid collections in the left groin measuring up to 2.4 cm, diffuse subcutaneous and intermuscular edema, which could be inflammatory or infectious, diffuse muscle atrophy with corresponding edema like signal, which could represent denervation or myositis.  CTA with runoff showed on the left, there is occlusion of the proximal left common iliac artery with reconstitution more distally though again diffuse circumferential severe plaque involves both the internal and external iliac arteries.  The external iliac artery is patent with diffuse plaque approximately 60-80% diameter narrowing.  The left common femoral is patent.  Multiple calcifications at the bifurcation.  Occluded left SFA bypass graft. Native left SFA also is occluded.  Severe plaque extends into the profundus femoral branches.  Only collateral vessels are identified in the left thigh.  Diffuse calcified plaque in the occluded left SFA noted.  Other findings noted in the result report.  Vascular surgery consulted.    Interval History:  Seen and evaluated on general medical floor  No acute events overnight    Clinical status improved  No new complaints    Objective:     Vital Signs (Most Recent):  Temp: 97.4 °F (36.3 °C) (11/20/24 1128)  Pulse: 78 (11/20/24 1156)  Resp: 18 (11/20/24 1128)  BP: (!) 162/79 (11/20/24 1128)  SpO2: 99 % (11/20/24  1128) Vital Signs (24h Range):  Temp:  [97.4 °F (36.3 °C)-98.6 °F (37 °C)] 97.4 °F (36.3 °C)  Pulse:  [76-85] 78  Resp:  [9-20] 18  SpO2:  [96 %-100 %] 99 %  BP: (128-169)/(51-79) 162/79     Weight: 71.2 kg (156 lb 15.5 oz)  Body mass index is 47.9 kg/m².  No intake or output data in the 24 hours ending 11/20/24 1339      Physical Exam  Constitutional:       Appearance: She is ill-appearing (chronically).   HENT:      Head: Normocephalic and atraumatic.      Right Ear: External ear normal.      Left Ear: External ear normal.   Eyes:      Extraocular Movements: Extraocular movements intact.      Conjunctiva/sclera: Conjunctivae normal.      Pupils: Pupils are equal, round, and reactive to light.   Cardiovascular:      Rate and Rhythm: Normal rate and regular rhythm.   Pulmonary:      Effort: Pulmonary effort is normal. No respiratory distress.   Abdominal:      General: Abdomen is flat. Bowel sounds are normal.   Musculoskeletal:         General: Tenderness (LLE) and deformity (bilateral BKA) present.   Skin:     Findings: No lesion.          Neurological:      General: No focal deficit present.      Mental Status: She is alert and oriented to person, place, and time.   Psychiatric:         Mood and Affect: Mood normal.             Significant Labs: All pertinent labs within the past 24 hours have been reviewed.    Significant Imaging: I have reviewed all pertinent imaging results/findings within the past 24 hours.    Assessment/Plan:      * Cellulitis of left thigh  First MR non diagnostic due to motion artifact  Repeat MR with sedation postoperative changes from above the knee amputation, 3.5 cm fluid collection at the femoral stump, which could represent a seroma, hematoma, or abscess; no evidence of osteomyelitis.  There are two fluid collections in the left groin measuring up to 2.4 cm, diffuse subcutaneous and intermuscular edema, which could be inflammatory or infectious, diffuse muscle atrophy with  corresponding edema like signal, which could represent denervation or myositis  CTA with runoff showed on the left, there is occlusion of the proximal left common iliac artery with reconstitution more distally though again diffuse circumferential severe plaque involves both the internal and external iliac arteries.  The external iliac artery is patent with diffuse plaque approximately 60-80% diameter narrowing.  The left common femoral is patent.  Multiple calcifications at the bifurcation.  Occluded left SFA bypass graft. Native left SFA also is occluded.  Severe plaque extends into the profundus femoral branches.  Only collateral vessels are identified in the left thigh.  Diffuse calcified plaque in the occluded left SFA noted.    Cont vanc/cefepime  Surgery consulted in ED; no current plans for intervention  Follow cultures if available  ID consulted, appreciate recommendations  Vascular surgery consulted, appreciate recommendations    Severe obesity (BMI >= 40)  Body mass index is 47.6 kg/m².  Would benefit from dietary and lifestyle modifications in relation to health  Consider dietary/nutrition consult outpatient        ESRD (end stage renal disease)  Consult nephro for HD management  HD TThS    Paroxysmal atrial fibrillation  Cont eliquis    Anemia due to chronic kidney disease, on chronic dialysis  Close monitoring  No indication for transfusion at this time  Goal directed resuscitation, transfuse for Hgb<7    CAD (coronary artery disease)  Cont plavix, eliquis  Cont statin    Type 2 diabetes mellitus with hyperglycemia, with long-term current use of insulin  Slide  Diabetic diet when not NPO  ACHS glucose checks    CAROLIN (generalized anxiety disorder)  Cont zoloft    Peripheral vascular disease  Cont statin  Cont eliquis and plavix  See plan for cellulitis      VTE Risk Mitigation (From admission, onward)           Ordered     heparin (porcine) injection 1,000 Units  As needed (PRN)         11/16/24 5562      apixaban tablet 5 mg  2 times daily         11/15/24 1833     IP VTE HIGH RISK PATIENT  Once         11/15/24 1648     Place sequential compression device  Until discontinued         11/15/24 1648     Reason for No Pharmacological VTE Prophylaxis  Once        Question:  Reasons:  Answer:  Already adequately anticoagulated on oral Anticoagulants    11/15/24 1648                    Discharge Planning   DEVAN: 11/24/2024     Code Status: Full Code   Is the patient medically ready for discharge?:     Reason for patient still in hospital (select all that apply): Patient trending condition  Discharge Plan A: Return to nursing home                  Cody Traore DO  Department of Hospital Medicine   [facility] Surg

## 2024-11-20 NOTE — SUBJECTIVE & OBJECTIVE
Interval History: HD yesterday, tolerated well. Net UF 2L.     Review of patient's allergies indicates:   Allergen Reactions    Ciprofloxacin Itching    Pcn [penicillins]      Rash; tolerated ceftriaxone on 1/13/20  Tolerating Augmentin November 2024     Current Facility-Administered Medications   Medication Frequency    acetaminophen tablet 1,000 mg Q8H PRN    allopurinol split tablet 50 mg Every other day    apixaban tablet 5 mg BID    atorvastatin tablet 80 mg Daily    calcitRIOL capsule 0.5 mcg Daily    carvediloL tablet 3.125 mg BID    ceFEPIme injection 1 g Q24H    cinacalcet tablet 30 mg Every Mon, Wed, Fri    clopidogreL tablet 75 mg Daily    dextrose 10% bolus 125 mL 125 mL PRN    dextrose 10% bolus 125 mL 125 mL PRN    dextrose 10% bolus 250 mL 250 mL PRN    dextrose 10% bolus 250 mL 250 mL PRN    epoetin nina-epbx injection 3,560 Units Every Tues, Thurs, Sat    glucagon (human recombinant) injection 1 mg PRN    glucose chewable tablet 16 g PRN    glucose chewable tablet 24 g PRN    heparin (porcine) injection 1,000 Units PRN    hydrALAZINE tablet 10 mg Q12H    insulin aspart U-100 pen 0-5 Units QID (AC + HS) PRN    isosorbide dinitrate tablet 10 mg BID    mupirocin 2 % ointment BID    naloxone 0.4 mg/mL injection 0.02 mg PRN    oxyCODONE-acetaminophen 5-325 mg per tablet 1 tablet Q8H PRN    pregabalin capsule 75 mg Daily    sertraline tablet 100 mg Daily    sevelamer carbonate tablet 800 mg TID WM    sodium chloride 0.9% flush 10 mL Q12H PRN    sodium chloride 0.9% flush 3 mL Q6H    vancomycin (VANCOCIN) 1,000 mg in D5W 250 mL IVPB (admixture device) Once    vancomycin - pharmacy to dose pharmacy to manage frequency       Objective:     Vital Signs (Most Recent):  Temp: 97.4 °F (36.3 °C) (11/20/24 1128)  Pulse: 78 (11/20/24 1156)  Resp: 18 (11/20/24 1128)  BP: (!) 162/79 (11/20/24 1128)  SpO2: 99 % (11/20/24 1128) Vital Signs (24h Range):  Temp:  [97.4 °F (36.3 °C)-98.6 °F (37 °C)] 97.4 °F (36.3  °C)  Pulse:  [76-85] 78  Resp:  [9-20] 18  SpO2:  [96 %-100 %] 99 %  BP: (128-169)/(51-79) 162/79     Weight: 71.2 kg (156 lb 15.5 oz) (11/16/24 0115)  Body mass index is 47.9 kg/m².  Body surface area is 1.55 meters squared.    I/O last 3 completed shifts:  In: 650 [Other:650]  Out: 2650 [Other:2650]     Physical Exam  Vitals and nursing note reviewed.   Constitutional:       Appearance: She is ill-appearing (chronically).   Eyes:      Conjunctiva/sclera: Conjunctivae normal.   Cardiovascular:      Rate and Rhythm: Normal rate.   Pulmonary:      Effort: Pulmonary effort is normal. No respiratory distress.   Abdominal:      General: Bowel sounds are normal.   Musculoskeletal:         General: Tenderness (LLE) and deformity (bilateral BKA) present.   Skin:         Neurological:      Mental Status: She is alert and oriented to person, place, and time.   Psychiatric:         Mood and Affect: Mood normal.          Significant Labs:  CBC:   Recent Labs   Lab 11/20/24  0445   WBC 7.48   RBC 2.28*   HGB 7.2*   HCT 21.9*      MCV 96   MCH 31.6*   MCHC 32.9     CMP:   Recent Labs   Lab 11/15/24  1533 11/16/24  0836 11/20/24  0445      < > 181*   CALCIUM 9.0   < > 7.9*   ALBUMIN 2.0*   < > 1.8*   PROT 8.6*  --   --    *   < > 127*   K 4.8   < > 4.0   CO2 18*   < > 22*      < > 95   BUN 30*   < > 27*   CREATININE 3.7*   < > 3.0*   ALKPHOS 362*  --   --    ALT 19  --   --    AST 38  --   --    BILITOT 0.4  --   --     < > = values in this interval not displayed.     All labs within the past 24 hours have been reviewed.

## 2024-11-20 NOTE — PROGRESS NOTES
[facility] - [facility]  Nephrology  Progress Note    Patient Name: Suyapa Connelly  MRN: [id number]  Admission Date: 11/15/2024  Hospital Length of Stay: 4 days  Attending Provider: Cody Traore DO   Primary Care Physician: Vanessa Noel MD  Principal Problem:Cellulitis of left thigh    Subjective:     Interval History: HD yesterday, tolerated well. Net UF 2L.     Review of patient's allergies indicates:   Allergen Reactions    Ciprofloxacin Itching    Pcn [penicillins]      Rash; tolerated ceftriaxone on 1/13/20  Tolerating Augmentin November 2024     Current Facility-Administered Medications   Medication Frequency    acetaminophen tablet 1,000 mg Q8H PRN    allopurinol split tablet 50 mg Every other day    apixaban tablet 5 mg BID    atorvastatin tablet 80 mg Daily    calcitRIOL capsule 0.5 mcg Daily    carvediloL tablet 3.125 mg BID    ceFEPIme injection 1 g Q24H    cinacalcet tablet 30 mg Every Mon, Wed, Fri    clopidogreL tablet 75 mg Daily    dextrose 10% bolus 125 mL 125 mL PRN    dextrose 10% bolus 125 mL 125 mL PRN    dextrose 10% bolus 250 mL 250 mL PRN    dextrose 10% bolus 250 mL 250 mL PRN    epoetin nina-epbx injection 3,560 Units Every Tues, Thurs, Sat    glucagon (human recombinant) injection 1 mg PRN    glucose chewable tablet 16 g PRN    glucose chewable tablet 24 g PRN    heparin (porcine) injection 1,000 Units PRN    hydrALAZINE tablet 10 mg Q12H    insulin aspart U-100 pen 0-5 Units QID (AC + HS) PRN    isosorbide dinitrate tablet 10 mg BID    mupirocin 2 % ointment BID    naloxone 0.4 mg/mL injection 0.02 mg PRN    oxyCODONE-acetaminophen 5-325 mg per tablet 1 tablet Q8H PRN    pregabalin capsule 75 mg Daily    sertraline tablet 100 mg Daily    sevelamer carbonate tablet 800 mg TID WM    sodium chloride 0.9% flush 10 mL Q12H PRN    sodium chloride 0.9% flush 3 mL Q6H    vancomycin (VANCOCIN) 1,000 mg in D5W 250 mL IVPB (admixture device) Once    vancomycin - pharmacy to dose pharmacy to  manage frequency       Objective:     Vital Signs (Most Recent):  Temp: 97.4 °F (36.3 °C) (11/20/24 1128)  Pulse: 78 (11/20/24 1156)  Resp: 18 (11/20/24 1128)  BP: (!) 162/79 (11/20/24 1128)  SpO2: 99 % (11/20/24 1128) Vital Signs (24h Range):  Temp:  [97.4 °F (36.3 °C)-98.6 °F (37 °C)] 97.4 °F (36.3 °C)  Pulse:  [76-85] 78  Resp:  [9-20] 18  SpO2:  [96 %-100 %] 99 %  BP: (128-169)/(51-79) 162/79     Weight: 71.2 kg (156 lb 15.5 oz) (11/16/24 0115)  Body mass index is 47.9 kg/m².  Body surface area is 1.55 meters squared.    I/O last 3 completed shifts:  In: 650 [Other:650]  Out: 2650 [Other:2650]     Physical Exam  Vitals and nursing note reviewed.   Constitutional:       Appearance: She is ill-appearing (chronically).   Eyes:      Conjunctiva/sclera: Conjunctivae normal.   Cardiovascular:      Rate and Rhythm: Normal rate.   Pulmonary:      Effort: Pulmonary effort is normal. No respiratory distress.   Abdominal:      General: Bowel sounds are normal.   Musculoskeletal:         General: Tenderness (LLE) and deformity (bilateral BKA) present.   Skin:         Neurological:      Mental Status: She is alert and oriented to person, place, and time.   Psychiatric:         Mood and Affect: Mood normal.          Significant Labs:  CBC:   Recent Labs   Lab 11/20/24  0445   WBC 7.48   RBC 2.28*   HGB 7.2*   HCT 21.9*      MCV 96   MCH 31.6*   MCHC 32.9     CMP:   Recent Labs   Lab 11/15/24  1533 11/16/24  0836 11/20/24  0445      < > 181*   CALCIUM 9.0   < > 7.9*   ALBUMIN 2.0*   < > 1.8*   PROT 8.6*  --   --    *   < > 127*   K 4.8   < > 4.0   CO2 18*   < > 22*      < > 95   BUN 30*   < > 27*   CREATININE 3.7*   < > 3.0*   ALKPHOS 362*  --   --    ALT 19  --   --    AST 38  --   --    BILITOT 0.4  --   --     < > = values in this interval not displayed.     All labs within the past 24 hours have been reviewed.       Assessment/Plan:     Renal/  ESRD (end stage renal disease)  59 year old female  hx of ESRD on HD TTS admitted for cellulitis left thigh. Last HD 11/14/24. Nephrology consulted for HD management.    Nephrology History  -Out[facility] HD [facility]: [facility] ?  -Nephrologist: MD Kerry  -HD tx days: TTS  -HD tx time: ?  -Last HD tx: 11/14/24  -HD access: L TDC  -HD modality: iHD  -Residual urine: anuric  -EDW:  67-67.5 kg ?    Plan/Recommendations  ESRD on HD:  -Continue TTS schedule. UF goal 2.5-3 L as tolerated  -Hyponatremia in setting of ESRD. Asymptomatic. Discussed 1L fluid restriction with patient and family at bedside.   -Strict I&Os  -Pre & post HD weight  Anemia in ESRD  -Hgb goal 10-11, continue Epo  -Iron c/I in the setting of infection    Mineral Bone Disease in ESRD    -Continue OP sensipar and calcitriol  -sevelamer 800 mg PO TIDWM  -Renal diet if not NPO      ID  * Cellulitis of left thigh  -management per jax        Thank you for your consult. I will follow-up with patient. Please contact us if you have any additional questions.    Katie Monique, CHRIS  Nephrology  [facility] - Med Surg

## 2024-11-20 NOTE — SUBJECTIVE & OBJECTIVE
Interval History: NAEON  Afebrile  No leukocytosis  Pt overall feeling better, still c/o LLE pain but improved    Review of Systems   Constitutional:  Negative for chills, fatigue and fever.   Respiratory:  Negative for chest tightness, shortness of breath and wheezing.    Cardiovascular:  Negative for chest pain.   Gastrointestinal:  Negative for abdominal distention, abdominal pain, constipation, diarrhea, nausea and vomiting.   Genitourinary:  Negative for difficulty urinating, dysuria, flank pain and vaginal discharge.   Musculoskeletal:  Negative for back pain.   Skin:  Positive for wound. Negative for rash.     Objective:     Vital Signs (Most Recent):  Temp: 98.4 °F (36.9 °C) (11/20/24 0743)  Pulse: 83 (11/20/24 0743)  Resp: 18 (11/20/24 0743)  BP: 128/73 (11/20/24 0743)  SpO2: 96 % (11/20/24 0743) Vital Signs (24h Range):  Temp:  [97.9 °F (36.6 °C)-98.6 °F (37 °C)] 98.4 °F (36.9 °C)  Pulse:  [76-85] 83  Resp:  [9-20] 18  SpO2:  [96 %-100 %] 96 %  BP: (128-169)/(51-77) 128/73     Weight: 71.2 kg (156 lb 15.5 oz)  Body mass index is 47.9 kg/m².    Estimated Creatinine Clearance: 15.5 mL/min (A) (based on SCr of 3 mg/dL (H)).     Physical Exam  Vitals and nursing note reviewed.   Constitutional:       General: She is awake. She is not in acute distress.     Appearance: Normal appearance. She is not ill-appearing.   HENT:      Head: Normocephalic and atraumatic.      Nose: Nose normal.   Cardiovascular:      Rate and Rhythm: Normal rate and regular rhythm.      Heart sounds: No murmur heard.  Pulmonary:      Effort: Pulmonary effort is normal. No respiratory distress.   Musculoskeletal:      Right Lower Extremity: Right leg is amputated above knee.      Left Lower Extremity: Left leg is amputated above knee.   Skin:     Findings: Wound (L AKA with foam dressing, small amount of purulent drainage) present. No lesion or rash.   Neurological:      Mental Status: She is alert and oriented to person, place, and time.    Psychiatric:         Mood and Affect: Mood normal.         Behavior: Behavior is cooperative.          Significant Labs: Blood Culture:   Recent Labs   Lab 06/12/24  0949 06/12/24  0954 10/29/24  1045 11/15/24  1543   LABBLOO No growth after 5 days. No growth after 5 days. No growth after 5 days.  No growth after 5 days. No Growth to date  No Growth to date  No Growth to date  No Growth to date  No Growth to date  No Growth to date  No Growth to date  No Growth to date  No Growth to date  No Growth to date     CBC:   Recent Labs   Lab 11/19/24  0242 11/20/24  0445   WBC 7.17 7.48   HGB 7.0* 7.2*   HCT 22.3* 21.9*    327         Significant Imaging: I have reviewed all pertinent imaging results/findings within the past 24 hours.

## 2024-11-20 NOTE — ASSESSMENT & PLAN NOTE
"60 y/o female PMHx: bilateral BKA, ESRD on HD, DM, HTN, HLD presents with purulence from L stump wound. Recently admitted and received IV abx during that admission and on discharge was transitioned to oral augmentin and doxy and she went to SNF.  She was sent back to the ED on 11/15 for continued purulence and LLE pain.  Surgery was consulted and have no plans for intervention at this time. WBC WNL. She has been admitted to hospital medicine for further management. MRI results below. MRI femur results pending.     ID consulted for "antibiotic management"    Blood cultures from 11/15 NGTD    Recommendations / Plan  Continue vanc and Cefepime 1 g q24 hrs  Recommend wound care  Imagine confirms cellulitis? Vascular/gen surg teams consulted by primary team     -- Discussed with ID staff Dr. Vidal and primary team  -- ID will continue to follow for further recs    "

## 2024-11-20 NOTE — ASSESSMENT & PLAN NOTE
59 year old female hx of ESRD on HD TTS admitted for cellulitis left thigh. Last HD 11/14/24. Nephrology consulted for HD management.    Nephrology History  -[facility] HD [facility] ?  -Nephrologist: MD Kerry  -HD tx days: TTS  -HD tx time: ?  -Last HD tx: 11/14/24  -HD access: L TDC  -HD modality: iHD  -Residual urine: anuric  -EDW:  67-67.5 kg ?    Plan/Recommendations  ESRD on HD:  -Continue TTS schedule. UF goal 2.5-3 L as tolerated  -Hyponatremia in setting of ESRD. Asymptomatic. Discussed 1L fluid restriction with patient and family at bedside.   -Strict I&Os  -Pre & post HD weight  Anemia in ESRD  -Hgb goal 10-11, continue Epo  -Iron c/I in the setting of infection    Mineral Bone Disease in ESRD    -Continue OP sensipar and calcitriol  -sevelamer 800 mg PO TIDWM  -Renal diet if not NPO

## 2024-11-20 NOTE — ASSESSMENT & PLAN NOTE
First MR non diagnostic due to motion artifact  Repeat MR with sedation postoperative changes from above the knee amputation, 3.5 cm fluid collection at the femoral stump, which could represent a seroma, hematoma, or abscess; no evidence of osteomyelitis.  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  The external iliac artery is patent with diffuse plaque approximately 60-80% diameter narrowing.  The left common femoral is patent.  Multiple calcifications at the bifurcation.  Occluded left SFA bypass graft. Native left SFA also is occluded.  Severe plaque extends into the profundus femoral branches.  Only collateral vessels are identified in the left thigh.  Diffuse calcified plaque in the occluded left SFA noted.    Cont vanc/cefepime  Surgery consulted in ED; no current plans for intervention  Follow cultures if available  ID consulted, appreciate recommendations  Vascular surgery consulted, appreciate recommendations

## 2024-11-20 NOTE — CONSULTS
[facility] Surg  Vascular Surgery  Consult Note    Inpatient consult to Vascular Surgery  Consult performed by: Sam Dodd MD  Consult ordered by: Cody Traore,       Subjective:     Chief Complaint/Reason for Admission:     History of Present Illness: 58yo female whose PMH includes bilateral AKA, L 2021 s/p thrombosed bypass, R 2022 ESRD, DM, HTN, HLD presents with purulence from L stump wound.  She was discharged from this facility 11.12.24 with oral abx or LLE cellulitis after bring admitted for septic shock. She did receive IV abx during that admission and on dishcharge was transitioned to oral augmentin and doxy and she went to SNF.  She was sent back to the ED for continued purulence and LLE pain.     Vascular surgery consulted for further evaluation of L stump wound in the setting of known PAD.     Medications Prior to Admission   Medication Sig Dispense Refill Last Dose/Taking    allopurinoL (ZYLOPRIM) 100 MG tablet Take 0.5 tablets (50 mg total) by mouth every other day. 8 tablet 11 11/18/2024 Morning    apixaban (ELIQUIS) 5 mg Tab Take 1 tablet (5 mg total) by mouth 2 (two) times daily. 60 tablet 11 11/18/2024 Bedtime    atorvastatin (LIPITOR) 80 MG tablet Take 1 tablet (80 mg total) by mouth once daily. 30 tablet 11 11/18/2024 Morning    calcitRIOL (ROCALTROL) 0.5 MCG Cap Take 1 capsule (0.5 mcg total) by mouth once daily. 30 capsule 11 11/18/2024 Morning    carvediloL (COREG) 6.25 MG tablet Take 0.5 tablets (3.125 mg total) by mouth 2 (two) times daily.   11/18/2024 Bedtime    cinacalcet (SENSIPAR) 30 MG Tab Take 1 tablet (30 mg total) by mouth every Mon, Wed, Fri.   11/18/2024 Evening    clopidogreL (PLAVIX) 75 mg tablet Take 1 tablet (75 mg total) by mouth once daily. 30 tablet 11 11/18/2024 Morning    epoetin nina (PROCRIT) 4,000 unit/mL injection Inject 1 mL (4,000 Units total) into the skin every Mon, Wed, Fri.   11/19/2024 Noon    hydrALAZINE (APRESOLINE) 10 MG tablet Take 1 tablet (10 mg  total) by mouth every 12 (twelve) hours.   2024 Bedtime    insulin aspart U-100 (NOVOLOG) 100 unit/mL (3 mL) InPn pen Inject 0-10 Units into the skin before meals and at bedtime as needed (Hyperglycemia). **MODERATE CORRECTION DOSE**  Blood Glucose  mg/dL                  Pre-meal                2200  151-200                2 units                    1 unit  201-250                4 units                    2 units  251-300                6 units                    3 units  301-350                8 units                    4 units  >350                     10 units                  5 units  Administer subcutaneously if needed at times designated by monitoring  schedule.   2024    isosorbide dinitrate (ISORDIL) 10 MG tablet Take 1 tablet (10 mg total) by mouth 2 (two) times daily.   2024 Bedtime    oxyCODONE-acetaminophen (PERCOCET) 5-325 mg per tablet Take 1 tablet by mouth every 8 (eight) hours as needed for Pain. 10 tablet 0 2024    pregabalin (LYRICA) 75 MG capsule Take 1 capsule (75 mg total) by mouth Daily. 7 capsule 0 2024    sertraline (ZOLOFT) 100 MG tablet Take 1 tablet (100 mg total) by mouth once daily. 30 tablet 11 2024    acetaminophen (TYLENOL) 325 MG tablet Take 2 tablets (650 mg total) by mouth every 8 (eight) hours as needed for Pain.   2024    [] amoxicillin-clavulanate 500-125mg (AUGMENTIN) 500-125 mg Tab Take 1 tablet (500 mg total) by mouth 2 (two) times daily. for 5 days       blood sugar diagnostic Strp 1 each by Misc.(Non-Drug; Combo Route) route 3 (three) times daily. 200 each 0     blood sugar diagnostic Strp Use to check blood glucose 2 (two) times a day. 100 strip 0     blood-glucose meter Misc TEST TWICE DAILY 1 each 0     blood-glucose sensor (DEXCOM G7 SENSOR) Radha 1 each by Misc.(Non-Drug; Combo Route) route once daily. 5 each 0     [] doxycycline (VIBRA-TABS) 100 MG tablet Take 1 tablet (100 mg total) by mouth every 12 (twelve)  "hours. for 5 days       famotidine (PEPCID) 20 MG tablet Take 1 tablet (20 mg total) by mouth once daily.   Unknown    insulin glargine U-100, Lantus, 100 unit/mL (3 mL) SubQ InPn pen Inject 5 Units into the skin every evening.   Unknown    Lactobacillus rhamnosus GG (CULTURELLE) 10 billion cell capsule Take 1 capsule by mouth once daily.   Unknown    lancets 33 gauge Misc TEST 3 TIMES DAILY 100 each 3     pen needle, diabetic 32 gauge x 5/32" Ndle 1 Units by Misc.(Non-Drug; Combo Route) route before meals as needed (SSI). 100 each 3     pen needle, diabetic 33 gauge x 5/32" Ndle 1 each by Misc.(Non-Drug; Combo Route) route 3 (three) times daily. 100 each 0     sodium bicarbonate 650 MG tablet Take 1 tablet (650 mg total) by mouth 3 (three) times daily. 90 tablet 11 Unknown    vitamin renal formula, B-complex-vitamin c-folic acid, (NEPHROCAP) 1 mg Cap Take 1 capsule by mouth once daily.   Unknown       Review of patient's allergies indicates:   Allergen Reactions    Ciprofloxacin Itching    Pcn [penicillins]      Rash; tolerated ceftriaxone on 1/13/20  Tolerating Augmentin November 2024       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    Osteomyelitis     Osteomyelitis of left foot 04/30/2021    Ulcer of left foot     Vaginal delivery     x1     Past Surgical History:   Procedure Laterality Date    ABOVE-KNEE AMPUTATION Left 5/18/2021    Procedure: AMPUTATION, ABOVE KNEE;  Surgeon: Teddy Huber MD;  Location: [facility] OR [address] [place] [address];  Service: Vascular;  Laterality: Left;    ABOVE-KNEE AMPUTATION Right 3/18/2022    Procedure: AMPUTATION, ABOVE KNEE;  Surgeon: DAYNE Florez II, MD;  Location: [facility] OR [address] [place] [address];  Service: Vascular;  Laterality: " Right;    Angiogram - Right Extremity Right 7/9/15    angiogram-left leg  10/6/15    ANGIOGRAPHY OF LOWER EXTREMITY Left 4/29/2021    Procedure: ANGIOGRAM, LOWER EXTREMITY;  Surgeon: Teddy Huber MD;  Location: [address];  Service: Vascular;  Laterality: Left;    BELOW KNEE AMPUTATION OF LOWER EXTREMITY Right 12/28/2021    Procedure: AMPUTATION, BELOW KNEE;  Surgeon: Kaitlyn Rojas MD;  Location: [facility];  Service: General;  Laterality: Right;    CATHETERIZATION OF BOTH LEFT AND RIGHT HEART N/A 12/18/2019    Procedure: CATHETERIZATION, HEART, BOTH LEFT AND RIGHT;  Surgeon: Que Fernando III, MD;  Location: [facility] CATH LAB;  Service: Cardiology;  Laterality: N/A;    CORONARY ANGIOGRAPHY N/A 12/18/2019    Procedure: ANGIOGRAM, CORONARY ARTERY;  Surgeon: Que Fernando III, MD;  Location: [facility] CATH LAB;  Service: Cardiology;  Laterality: N/A;    CORONARY ANGIOGRAPHY INCLUDING BYPASS GRAFTS WITH CATHETERIZATION OF LEFT HEART N/A 7/28/2020    Procedure: ANGIOGRAM, CORONARY, INCLUDING BYPASS GRAFT, WITH LEFT HEART CATHETERIZATION, 9 am;  Surgeon: Rachel Easley MD;  Location: [facility] CATH LAB;  Service: Cardiology;  Laterality: N/A;    CORONARY ARTERY BYPASS GRAFT (CABG) N/A 1/14/2020    Procedure: CORONARY ARTERY BYPASS GRAFT (CABG) x 1     Off Pump;  Surgeon: Huang Altamirano MD;  Location: [address];  Service: Cardiovascular;  Laterality: N/A;    CREATION OF FEMORAL-TIBIAL ARTERY BYPASS Left 4/29/2021    Procedure: CREATION, BYPASS, ARTERIAL, FEMORAL TO ANTERIOR TIBIAL;  Surgeon: Teddy Huber MD;  Location: [address];  Service: Vascular;  Laterality: Left;    CREATION OF FEMOROPOPLITEAL ARTERIAL BYPASS USING GRAFT Left 8/18/2020    Procedure: CREATION, BYPASS, ARTERIAL, FEMORAL TO POPLITEAL, USING GRAFT, LEFT LOWER EXTREMITY;  Surgeon: Teddy Huber MD;  Location: [facility];  Service: Vascular;  Laterality: Left;  REQUEST 7:15 A.M. START----COVID NEGATIVE ON  8/17  1ST CASE STARTE PER LEANA ON 8/7/2020 @ 942AM-LO  RN PREOP 8/12/2020   T/S-----CLEARED BY CARDS-------PENDING INSURANCE    DEBRIDEMENT OF FOOT Left 9/8/2020    Procedure: DEBRIDEMENT, FOOT;  Surgeon: Rosio Mayes DPM;  Location: [facility] OR;  Service: Podiatry;  Laterality: Left;  request neoxx .   RN Pre Op 9-4-2020, Covid negative on 9/5/20. C A    DEBRIDEMENT OF FOOT  3/4/2021    Procedure: DEBRIDEMENT, FOOT;  Surgeon: Teddy Huber MD;  Location: [facility] OR;  Service: Vascular;;    DEBRIDEMENT OF FOOT Left 3/9/2021    Procedure: DEBRIDEMENT, FOOT, bone biopsy;  Surgeon: Rosoi Mayes DPM;  Location: [facility] OR;  Service: Podiatry;  Laterality: Left;  Request neoxx---COVID IN AM  REQUESTING NOON START  RN Phone Pre op.On Blood thinners Plavix and Eliquis.  Covid am of surgery. C A    DEBRIDEMENT OF FOOT Left 5/4/2021    Procedure: DEBRIDEMENT, FOOT;  Surgeon: Farooq Morley DPM;  [address];  Service: Podiatry;  Laterality: Left;    ESOPHAGOGASTRODUODENOSCOPY N/A 11/7/2024    Procedure: EGD (ESOPHAGOGASTRODUODENOSCOPY);  Surgeon: Yony Cancino MD;  Location: [facility];  Service: Endoscopy;  Laterality: N/A;    INSERTION OF TUNNELED CENTRAL VENOUS HEMODIALYSIS CATHETER N/A 1/27/2020    Procedure: Insertion, Catheter, Central Venous, Hemodialysis;  Surgeon: ESTEBAN Gomez III, MD;  Location: [facility] CATH LAB;  Service: Peripheral Vascular;  Laterality: N/A;    INSERTION OF TUNNELED CENTRAL VENOUS HEMODIALYSIS CATHETER  5/10/2023    Procedure: Insertion, Catheter, Central Venous, Hemodialysis;  Surgeon: Romulo Queen MD;  Location: [facility] CATH LAB;  Service: Cardiology;;    MAGNETIC RESONANCE IMAGING Left 11/19/2024    Procedure: MRI (Magnetic Resonance Imagine);  Surgeon: Sophia Hernandez;  Location: [facility];  Service: Anesthesiology;  Laterality: Left;    PERCUTANEOUS TRANSLUMINAL ANGIOPLASTY N/A 3/4/2021    Procedure: PTA (ANGIOPLASTY, PERCUTANEOUS, TRANSLUMINAL);   Surgeon: Teddy Huber MD;  Location: [facility] OR;  Service: Vascular;  Laterality: N/A;    REMOVAL OF ARTERIOVENOUS GRAFT Left 5/27/2021    Procedure: REMOVAL, GRAFT, LEFT LOWER EXTREMITY, WOUND EXPLORATION;  Surgeon: Teddy Huber MD;  Location: [facility] OR 2ND FLR;  Service: Vascular;  Laterality: Left;    REMOVAL OF NAIL OF DIGIT Left 3/9/2021    Procedure: REMOVAL, NAIL, DIGIT;  Surgeon: Rosio Mayes DPM;  Location: [facility] OR;  Service: Podiatry;  Laterality: Left;    RIGHT HEART CATHETERIZATION Right 5/10/2023    Procedure: INSERTION, CATHETER, RIGHT HEART;  Surgeon: Romulo Queen MD;  Location: [facility] CATH LAB;  Service: Cardiology;  Laterality: Right;    THROMBECTOMY Left 3/4/2021    Procedure: THROMBECTOMY, LEFT LOWER EXTREMITY BYPASS GRAFT, ANGIOGRAM, POSSIBLE INTERVENTION, POSSIBLE LEFT LOWER EXTREMITY BYPASS;  Surgeon: Teddy Huber MD;  Location: [facility] OR;  Service: Vascular;  Laterality: Left;    THROMBECTOMY Left 4/29/2021    Procedure: GRAFT THROMBECTOMY, LEFT LOWER EXTREMITY;  Surgeon: Teddy Huber MD;  Location: [facility] OR 2ND FLR;  Service: Vascular;  Laterality: Left;  14.5 min  1179.85 mGy  341.01 Gycm2  240 ml dye    THROMBECTOMY  10/22/2021    Procedure: THROMBECTOMY;  Surgeon: Saad Arenas MD;  Location: [facility] CATH LAB/EP;  Service: Cardiology;;     Family History       Problem Relation (Age of Onset)    Diabetes Mother, Father, Paternal Grandmother    Heart disease Maternal Grandmother    No Known Problems Maternal Grandfather, Paternal Grandfather          Tobacco Use    Smoking status: Former    Smokeless tobacco: Never   Substance and Sexual Activity    Alcohol use: No    Drug use: Yes     Types: Marijuana     Comment: occassional    Sexual activity: Yes     Partners: Male     Review of Systems   Constitutional:  Negative for activity change, appetite change and fever.   Skin:  Positive for wound.     Objective:     Vital Signs (Most Recent):  Temp: 97.4 °F (36.3  °C) (11/20/24 1128)  Pulse: 78 (11/20/24 1156)  Resp: 18 (11/20/24 1128)  BP: (!) 162/79 (11/20/24 1128)  SpO2: 99 % (11/20/24 1128) Vital Signs (24h Range):  Temp:  [97.4 °F (36.3 °C)-98.6 °F (37 °C)] 97.4 °F (36.3 °C)  Pulse:  [76-85] 78  Resp:  [9-20] 18  SpO2:  [96 %-100 %] 99 %  BP: (128-169)/(51-79) 162/79     Weight: 71.2 kg (156 lb 15.5 oz)  Body mass index is 47.9 kg/m².      Physical Exam  HENT:      Head: Normocephalic.   Eyes:      Pupils: Pupils are equal, round, and reactive to light.   Cardiovascular:      Comments: Nonpalpable femoral pulses bilaterally.   Skin:     Comments: L AKA wound present with necrotic eschar and discharge.    Neurological:      Mental Status: She is alert.          Significant Labs:  All pertinent labs from the last 24 hours have been reviewed.    Significant Diagnostics:  I have reviewed all pertinent imaging results/findings within the past 24 hours.  Assessment/Plan:     Peripheral vascular disease  58yo female whose PMH includes bilateral AKA, L 2021 s/p thrombosed bypass, R 2022 ESRD, DM, HTN, HLD presents with purulence from L stump wound.  She was discharged from this facility 11.12.24 with oral abx or LLE cellulitis after bring admitted for septic shock. She did receive IV abx during that admission and on dishcharge was transitioned to oral augmentin and doxy and she went to SNF.  She was sent back to the ED for continued purulence and LLE pain.     - L common iliac occlusion, Thigh pressures pending.   - Likely will need debridement of AKA.          Thank you for your consult.     Sam Dodd MD  Vascular Surgery  [facility] Surg

## 2024-11-20 NOTE — NURSING
Patient had a bowel movement , after providing pain medication ,   surrounding perineal skin disintegrations cleansed with gauge soaked in sterile water and dried with dabs of sterile gauge . Mepilex applied . Ongoing care with each bowel movement would be helpful .     Thigh wound cleansed gently with sterile water soaked sterile gauge , secured with sterile gauge and mepilex . On duty MD Aware . General Surgery Team and Wound Care Consultation at place for patient . Will carry out instructions as received . No new Orders at this time .

## 2024-11-20 NOTE — PROGRESS NOTES
"[facility] Surg  Infectious Disease  Progress Note    Patient Name: Suyapa Connelly  MRN: [id number]  Admission Date: 11/15/2024  Length of Stay: 4 days  Attending Physician: Cody Traore DO  Primary Care Provider: Vanessa Noel MD    Isolation Status: No active isolations  Assessment/Plan:      ID  * Cellulitis of left thigh  58 y/o female PMHx: bilateral BKA, ESRD on HD, DM, HTN, HLD presents with purulence from L stump wound. Recently admitted and received IV abx during that admission and on discharge was transitioned to oral augmentin and doxy and she went to SNF.  She was sent back to the ED on 11/15 for continued purulence and LLE pain.  Surgery was consulted and have no plans for intervention at this time. WBC WNL. She has been admitted to hospital medicine for further management. MRI results below. MRI femur results pending.     ID consulted for "antibiotic management"    Blood cultures from 11/15 NGTD    Recommendations / Plan  Continue vanc and Cefepime 1 g q24 hrs  Recommend wound care  No osteo, + cellulitis on imaging  Vascular surgery consulted. Will f/u with plan.   Please obtain wound cultures for better antibiotic management if pt is taken to OR      -- Discussed with ID staff Dr. Vidal and primary team  -- ID will continue to follow for further recs          Anticipated Disposition: TBD, will likely need ~2 weeks of antibiotics     Thank you for your consult. I will follow-up with patient. Please contact us if you have any additional questions.    ADRIAN Abdi  Infectious Disease  [facility] Surg    Subjective:     Principal Problem:Cellulitis of left thigh    HPI: 58 y/o female PMHx: bilateral BKA, ESRD on HD, DM, HTN, HLD presents with purulence from L stump wound.  She was discharged from this facility 11/12/24 with oral abx for LLE cellulitis after bring admitted for septic shock.  During that admission there were concerns for nec fasc as there was subcutaneous " emphysema noted on CT, however, nec fasc was ruled out.  She did receive IV abx during that admission and on dishcharge was transitioned to oral augmentin and doxy and she went to SNF.  She was sent back to the ED for continued purulence and LLE pain.  Surgery was consulted and have no plans for intervention at this time. WBC WNL. She has been admitted to hospital medicine for further management.      -- ID will continue to follow for further recs    Interval History: NAEON  Afebrile  No leukocytosis  Pt overall feeling better, still c/o LLE pain but improved    Review of Systems   Constitutional:  Negative for chills, fatigue and fever.   Respiratory:  Negative for chest tightness, shortness of breath and wheezing.    Cardiovascular:  Negative for chest pain.   Gastrointestinal:  Negative for abdominal distention, abdominal pain, constipation, diarrhea, nausea and vomiting.   Genitourinary:  Negative for difficulty urinating, dysuria, flank pain and vaginal discharge.   Musculoskeletal:  Negative for back pain.   Skin:  Positive for wound. Negative for rash.     Objective:     Vital Signs (Most Recent):  Temp: 98.4 °F (36.9 °C) (11/20/24 0743)  Pulse: 83 (11/20/24 0743)  Resp: 18 (11/20/24 0743)  BP: 128/73 (11/20/24 0743)  SpO2: 96 % (11/20/24 0743) Vital Signs (24h Range):  Temp:  [97.9 °F (36.6 °C)-98.6 °F (37 °C)] 98.4 °F (36.9 °C)  Pulse:  [76-85] 83  Resp:  [9-20] 18  SpO2:  [96 %-100 %] 96 %  BP: (128-169)/(51-77) 128/73     Weight: 71.2 kg (156 lb 15.5 oz)  Body mass index is 47.9 kg/m².    Estimated Creatinine Clearance: 15.5 mL/min (A) (based on SCr of 3 mg/dL (H)).     Physical Exam  Vitals and nursing note reviewed.   Constitutional:       General: She is awake. She is not in acute distress.     Appearance: Normal appearance. She is not ill-appearing.   HENT:      Head: Normocephalic and atraumatic.      Nose: Nose normal.   Cardiovascular:      Rate and Rhythm: Normal rate and regular rhythm.       Heart sounds: No murmur heard.  Pulmonary:      Effort: Pulmonary effort is normal. No respiratory distress.   Musculoskeletal:      Right Lower Extremity: Right leg is amputated above knee.      Left Lower Extremity: Left leg is amputated above knee.   Skin:     Findings: Wound (L AKA with foam dressing, small amount of purulent drainage) present. No lesion or rash.   Neurological:      Mental Status: She is alert and oriented to person, place, and time.   Psychiatric:         Mood and Affect: Mood normal.         Behavior: Behavior is cooperative.          Significant Labs: Blood Culture:   Recent Labs   Lab 06/12/24  0949 06/12/24  0954 10/29/24  1045 11/15/24  1543   LABBLOO No growth after 5 days. No growth after 5 days. No growth after 5 days.  No growth after 5 days. No Growth to date  No Growth to date  No Growth to date  No Growth to date  No Growth to date  No Growth to date  No Growth to date  No Growth to date  No Growth to date  No Growth to date     CBC:   Recent Labs   Lab 11/19/24  0242 11/20/24  0445   WBC 7.17 7.48   HGB 7.0* 7.2*   HCT 22.3* 21.9*    327         Significant Imaging: I have reviewed all pertinent imaging results/findings within the past 24 hours.

## 2024-11-20 NOTE — PROGRESS NOTES
Pharmacokinetic Assessment Follow Up: IV Vancomycin     Vancomycin 1000 mg IV once was ordered for 11/19 @ 1400. This dose was not given. Order d/breonna and Vancomycin 500 mg IV once ordered for 11/20 @ 1500. Patient scheduled for Dialysis on 11/21. Will draw Vancomycin Random Level on 11/21 prior to Dialysis. Redose after Dialysis on 11/21 if warranted.     Thanks!   Viry Coleman, PharmD  11/20/2024 2:15 PM

## 2024-11-20 NOTE — ASSESSMENT & PLAN NOTE
58yo female whose PMH includes bilateral AKA, L 2021 s/p thrombosed bypass, R 2022 ESRD, DM, HTN, HLD presents with purulence from L stump wound.  She was discharged from this facility 11.12.24 with oral abx or LLE cellulitis after bring admitted for septic shock. She did receive IV abx during that admission and on dishcharge was transitioned to oral augmentin and doxy and she went to SNF.  She was sent back to the ED for continued purulence and LLE pain.     - L common iliac occlusion, Thigh pressures pending.   - Likely will need debridement of AKA.

## 2024-11-21 ENCOUNTER — OFFICE VISIT (OUTPATIENT)
Dept: DIALYSIS | Facility: HOSPITAL | Age: 59
End: 2024-11-21
Attending: STUDENT IN AN ORGANIZED HEALTH CARE EDUCATION/TRAINING PROGRAM
Payer: MEDICARE

## 2024-11-21 ENCOUNTER — ANESTHESIA EVENT (OUTPATIENT)
Dept: SURGERY | Facility: HOSPITAL | Age: 59
End: 2024-11-21
Payer: MEDICARE

## 2024-11-21 LAB
ALBUMIN SERPL BCP-MCNC: 1.9 G/DL (ref 3.5–5.2)
ANION GAP SERPL CALC-SCNC: 11 MMOL/L (ref 8–16)
BUN SERPL-MCNC: 32 MG/DL (ref 6–20)
CALCIUM SERPL-MCNC: 7.9 MG/DL (ref 8.7–10.5)
CHLORIDE SERPL-SCNC: 92 MMOL/L (ref 95–110)
CO2 SERPL-SCNC: 21 MMOL/L (ref 23–29)
CREAT SERPL-MCNC: 3.5 MG/DL (ref 0.5–1.4)
ERYTHROCYTE [DISTWIDTH] IN BLOOD BY AUTOMATED COUNT: 19.1 % (ref 11.5–14.5)
EST. GFR  (NO RACE VARIABLE): 14.5 ML/MIN/1.73 M^2
GLUCOSE SERPL-MCNC: 95 MG/DL (ref 70–110)
HCT VFR BLD AUTO: 21.5 % (ref 37–48.5)
HGB BLD-MCNC: 7 G/DL (ref 12–16)
MAGNESIUM SERPL-MCNC: 1.8 MG/DL (ref 1.6–2.6)
MCH RBC QN AUTO: 31.4 PG (ref 27–31)
MCHC RBC AUTO-ENTMCNC: 32.6 G/DL (ref 32–36)
MCV RBC AUTO: 96 FL (ref 82–98)
PHOSPHATE SERPL-MCNC: 4.2 MG/DL (ref 2.7–4.5)
PLATELET # BLD AUTO: 294 K/UL (ref 150–450)
PMV BLD AUTO: 10.5 FL (ref 9.2–12.9)
POCT GLUCOSE: 124 MG/DL (ref 70–110)
POCT GLUCOSE: 163 MG/DL (ref 70–110)
POTASSIUM SERPL-SCNC: 4.4 MMOL/L (ref 3.5–5.1)
RBC # BLD AUTO: 2.23 M/UL (ref 4–5.4)
SODIUM SERPL-SCNC: 124 MMOL/L (ref 136–145)
SODIUM SERPL-SCNC: 124 MMOL/L (ref 136–145)
SODIUM SERPL-SCNC: 130 MMOL/L (ref 136–145)
VANCOMYCIN SERPL-MCNC: 18.2 UG/ML
WBC # BLD AUTO: 6.72 K/UL (ref 3.9–12.7)

## 2024-11-21 PROCEDURE — 83735 ASSAY OF MAGNESIUM: CPT | Mod: HCNC

## 2024-11-21 PROCEDURE — 84295 ASSAY OF SERUM SODIUM: CPT | Mod: HCNC

## 2024-11-21 PROCEDURE — 63600175 PHARM REV CODE 636 W HCPCS: Mod: JG,HCNC

## 2024-11-21 PROCEDURE — 25000003 PHARM REV CODE 250: Mod: HCNC | Performed by: ANESTHESIOLOGY

## 2024-11-21 PROCEDURE — 3044F HG A1C LEVEL LT 7.0%: CPT | Mod: HCNC,CPTII,, | Performed by: NURSE PRACTITIONER

## 2024-11-21 PROCEDURE — 25000003 PHARM REV CODE 250: Mod: HCNC | Performed by: NURSE PRACTITIONER

## 2024-11-21 PROCEDURE — 80202 ASSAY OF VANCOMYCIN: CPT | Mod: HCNC

## 2024-11-21 PROCEDURE — 25000003 PHARM REV CODE 250: Mod: HCNC

## 2024-11-21 PROCEDURE — 85027 COMPLETE CBC AUTOMATED: CPT | Mod: HCNC

## 2024-11-21 PROCEDURE — 90935 HEMODIALYSIS ONE EVALUATION: CPT | Mod: HCNC,,, | Performed by: NURSE PRACTITIONER

## 2024-11-21 PROCEDURE — 3066F NEPHROPATHY DOC TX: CPT | Mod: HCNC,CPTII,, | Performed by: NURSE PRACTITIONER

## 2024-11-21 PROCEDURE — 80100016 HC MAINTENANCE HEMODIALYSIS: Mod: HCNC

## 2024-11-21 PROCEDURE — 63600175 PHARM REV CODE 636 W HCPCS: Mod: HCNC

## 2024-11-21 PROCEDURE — 99232 SBSQ HOSP IP/OBS MODERATE 35: CPT | Mod: HCNC,GC,,

## 2024-11-21 PROCEDURE — 94761 N-INVAS EAR/PLS OXIMETRY MLT: CPT | Mod: HCNC

## 2024-11-21 PROCEDURE — A4216 STERILE WATER/SALINE, 10 ML: HCPCS | Mod: HCNC | Performed by: ANESTHESIOLOGY

## 2024-11-21 PROCEDURE — 80069 RENAL FUNCTION PANEL: CPT | Mod: HCNC

## 2024-11-21 PROCEDURE — 21400001 HC TELEMETRY ROOM: Mod: HCNC

## 2024-11-21 RX ADMIN — Medication 3 ML: at 06:11

## 2024-11-21 RX ADMIN — CLOPIDOGREL BISULFATE 75 MG: 75 TABLET ORAL at 12:11

## 2024-11-21 RX ADMIN — CARVEDILOL 3.12 MG: 3.12 TABLET, FILM COATED ORAL at 09:11

## 2024-11-21 RX ADMIN — HEPARIN SODIUM 1000 UNITS: 1000 INJECTION, SOLUTION INTRAVENOUS; SUBCUTANEOUS at 11:11

## 2024-11-21 RX ADMIN — MORPHINE SULFATE 2 MG: 2 INJECTION, SOLUTION INTRAMUSCULAR; INTRAVENOUS at 12:11

## 2024-11-21 RX ADMIN — HEPARIN SODIUM 5000 UNITS: 5000 INJECTION INTRAVENOUS; SUBCUTANEOUS at 09:11

## 2024-11-21 RX ADMIN — HYDRALAZINE HYDROCHLORIDE 10 MG: 10 TABLET ORAL at 10:11

## 2024-11-21 RX ADMIN — Medication 3 ML: at 12:11

## 2024-11-21 RX ADMIN — SEVELAMER CARBONATE 800 MG: 800 TABLET, FILM COATED ORAL at 12:11

## 2024-11-21 RX ADMIN — MUPIROCIN: 20 OINTMENT TOPICAL at 12:11

## 2024-11-21 RX ADMIN — HEPARIN SODIUM 5000 UNITS: 5000 INJECTION INTRAVENOUS; SUBCUTANEOUS at 06:11

## 2024-11-21 RX ADMIN — PREGABALIN 75 MG: 75 CAPSULE ORAL at 04:11

## 2024-11-21 RX ADMIN — OXYCODONE HYDROCHLORIDE AND ACETAMINOPHEN 1 TABLET: 5; 325 TABLET ORAL at 04:11

## 2024-11-21 RX ADMIN — SEVELAMER CARBONATE 800 MG: 800 TABLET, FILM COATED ORAL at 04:11

## 2024-11-21 RX ADMIN — EPOETIN ALFA-EPBX 3560 UNITS: 4000 INJECTION, SOLUTION INTRAVENOUS; SUBCUTANEOUS at 09:11

## 2024-11-21 RX ADMIN — ATORVASTATIN CALCIUM 80 MG: 40 TABLET, FILM COATED ORAL at 12:11

## 2024-11-21 RX ADMIN — HEPARIN SODIUM 5000 UNITS: 5000 INJECTION INTRAVENOUS; SUBCUTANEOUS at 02:11

## 2024-11-21 RX ADMIN — CALCITRIOL 0.5 MCG: 0.5 CAPSULE, LIQUID FILLED ORAL at 12:11

## 2024-11-21 RX ADMIN — MORPHINE SULFATE 2 MG: 2 INJECTION, SOLUTION INTRAMUSCULAR; INTRAVENOUS at 06:11

## 2024-11-21 RX ADMIN — CEFEPIME 1 G: 1 INJECTION, POWDER, FOR SOLUTION INTRAMUSCULAR; INTRAVENOUS at 02:11

## 2024-11-21 RX ADMIN — SERTRALINE HYDROCHLORIDE 100 MG: 50 TABLET ORAL at 12:11

## 2024-11-21 RX ADMIN — ISOSORBIDE DINITRATE 10 MG: 10 TABLET ORAL at 09:11

## 2024-11-21 NOTE — ANESTHESIA PREPROCEDURE EVALUATION
[facility]  Anesthesia Pre-Operative Evaluation    Patient Name: Suyapa Connelly  YOB: 1965  MRN: [id number]    SUBJECTIVE:   11/21/2024    Pre-operative evaluation for Procedure(s) (LRB):  ANGIOGRAM, EXTREMITY, UNILATERAL (Left)  IRRIGATION AND DEBRIDEMENT, LOWER EXTREMITY (Left)    Suyapa Connelly is a 59 y.o. female with a PMHx significant for HTN, T2DM, CHF (EF ~15%), CAD s/p CABG, PAD, pAF, ESRD on HDS, GERD, s/p bilateral AKAs admitted for cellulitis of LLE. Patient now presents for the above procedure(s).    Previous Airway  Intubation:  Induction: Intravenous  Intubated: Postinduction  Mask Ventilation: Easy mask  Attempts: 1  Attempted By: CRNA  Method of Intubation: Video laryngoscopy  Blade: Elizabeth 3  Laryngeal View Grade: Grade I - full view of cords     LDA:        Hemodialysis Catheter left subclavian (Active)   Line Necessity Review CRRT/HD 11/19/24 1240   Verification by X-ray Yes 11/10/24 0923   Site Assessment No drainage;No redness;No swelling;No warmth 11/19/24 1240   Line Securement Device Secured with sutures 11/19/24 1240   Dressing Type CHG impregnated dressing/sponge 11/19/24 1940   Dressing Status Clean;Dry;Intact 11/20/24 1944   Dressing Intervention Integrity maintained 11/20/24 1944   Date on Dressing 11/16/24 11/19/24 0804   Dressing Due to be Changed 11/23/24 11/19/24 0804   Venous Patency/Care accessed;flushed w/o difficulty;blood return present 11/19/24 0812   Arterial Patency/Care accessed;flushed w/o difficulty;blood return present 11/19/24 0812   Waveform Not being transduced 11/06/24 1850   Number of days:             Peripheral IV - Single Lumen 11/17/24 1115 20 G 1 3/4 in Anterior;Right Forearm (Active)   Site Assessment Clean;Dry;Intact 11/20/24 1944   Line Securement Device Secured with sutureless device 11/19/24 1240   Extremity Assessment Distal to IV No warmth;No swelling;No redness;No abnormal discoloration 11/19/24 1240   Line  Status Saline locked;Flushed 11/20/24 1200   Dressing Status Clean;Dry;Intact 11/20/24 1200   Dressing Intervention Integrity maintained 11/20/24 1944   Number of days: 3       Transthoracic Echo :  Results for orders placed during the hospital encounter of 10/28/24    Echo Saline Bubble? No; Ultrasound enhancing contrast? No    Interpretation Summary    Left Ventricle: The left ventricle is normal in size. Mildly increased wall thickness. Regional wall motion abnormalities present. Septal motion is abnormal is consistent with bundle branch block.    Right Ventricle: Severe right ventricular enlargement. Systolic function is severely reduced.    Left Atrium: Atrial septum is bulging to the left.    Right Atrium: Right atrium is severely dilated.    Aortic Valve: The aortic valve is a trileaflet valve. There is mild aortic valve sclerosis. There is mild aortic regurgitation with a centrally directed jet.    Mitral Valve: There is mild regurgitation with a centrally directed jet.    Tricuspid Valve: There is severe regurgitation with a centrally directed jet.    Pulmonary Artery: No pulmonary hypertension. The estimated pulmonary artery systolic pressure is 26 mmHg.    IVC/SVC: Elevated venous pressure at 15 mmHg.      Patient Active Problem List   Diagnosis    Peripheral vascular disease    Hypocalcemia    Vitamin D deficiency    CAROLIN (generalized anxiety disorder)    Acute cystitis without hematuria    Type 2 diabetes mellitus with hyperglycemia, with long-term current use of insulin    CAD (coronary artery disease)    S/P CABG x 1    Anemia due to chronic kidney disease, on chronic dialysis    Paroxysmal atrial fibrillation    Hyponatremia    S/P femoral-popliteal bypass surgery    Thrombosis of femoro-popliteal bypass graft    Impaired functional mobility and endurance    Nephrotic syndrome    Encephalopathy    Uremia    Vascular graft infection    Hypoalbuminemia    Chronic combined systolic and diastolic  congestive heart failure    Postmenopausal bleeding    Uterine fibroid    Muscle wasting and atrophy, not elsewhere classified, right thigh     Adjustment disorder with mixed anxiety and depressed mood    Phantom limb syndrome with pain    Peripheral neuropathic pain    Impaired eye movement    Septic shock    Myoclonus    Dermatitis associated with moisture    Uses self-applied continuous glucose monitoring device    S/P AKA (above knee amputation) bilateral    Hyperkalemia    Debility    ESRD (end stage renal disease)    Myalgia    Chronic kidney disease-mineral and bone disorder    Uremic encephalopathy    Acute metabolic encephalopathy    Morbid obesity with BMI of 50.0-59.9, adult    Hypoglycemia    Depression    Unresponsive    Thrombocytopenia    Severe obesity (BMI >= 40)    Irritant contact dermatitis due to friction or contact with body fluids, unspecified    Pressure injury of sacral region, unstageable    Confusion    Discharge planning issues    Postconcussive syndrome    Screening for cervical cancer    Need for vaccination    Mastodynia    Need for Tdap vaccination    Type 2 diabetes mellitus with chronic kidney disease on chronic dialysis, with long-term current use of insulin    Atherosclerosis of native coronary artery of native heart with angina pectoris    Breast asymmetry    Psychophysiological insomnia    Social problem    Open thigh wound, left, initial encounter    Cellulitis of left thigh    Left thigh pain     Review of patient's allergies indicates:   Allergen Reactions    Ciprofloxacin Itching    Pcn [penicillins]      Rash; tolerated ceftriaxone on 1/13/20  Tolerating Augmentin November 2024       Current Outpatient Medications   Medication Instructions    acetaminophen (TYLENOL) 650 mg, Oral, Every 8 hours PRN    allopurinoL (ZYLOPRIM) 50 mg, Oral, Every other day    apixaban (ELIQUIS) 5 mg, Oral, 2 times daily    atorvastatin (LIPITOR) 80 mg, Oral, Daily    blood sugar diagnostic Strp  "1 each, Misc.(Non-Drug; Combo Route), 3 times daily    blood sugar diagnostic Strp Use to check blood glucose 2 (two) times a day.    blood-glucose meter Misc TEST TWICE DAILY    blood-glucose sensor (DEXCOM G7 SENSOR) Radha 1 each, Misc.(Non-Drug; Combo Route), Daily    calcitRIOL (ROCALTROL) 0.5 mcg, Oral, Daily    carvediloL (COREG) 3.125 mg, Oral, 2 times daily    cinacalcet (SENSIPAR) 30 mg, Oral, Every Mon, Wed, Fri    clopidogreL (PLAVIX) 75 mg, Oral, Daily    epoetin nina (PROCRIT) 50 Units/kg, Subcutaneous, Every Mon, Wed, Fri    famotidine (PEPCID) 20 mg, Oral, Daily    hydrALAZINE (APRESOLINE) 10 mg, Oral, Every 12 hours    insulin aspart U-100 (NOVOLOG) 0-10 Units, Subcutaneous, Before meals & nightly PRN, **MODERATE CORRECTION DOSE**  Blood Glucose  mg/dL                  Pre-meal                2200  151-200                2 units                    1 unit  201-250                4 units                    2 units  251-300                6 units                    3 units  301-350                8 units                    4 units  >350                     10 units                  5 units  Administer subcutaneously if needed at times designated by monitoring  schedule.    insulin glargine U-100 (Lantus) 5 Units, Subcutaneous, Nightly    isosorbide dinitrate (ISORDIL) 10 mg, Oral, 2 times daily    Lactobacillus rhamnosus GG (CULTURELLE) 10 billion cell capsule 1 capsule, Oral, Daily    lancets 33 gauge Misc TEST 3 TIMES DAILY    oxyCODONE-acetaminophen (PERCOCET) 5-325 mg per tablet 1 tablet, Oral, Every 8 hours PRN    pen needle, diabetic 33 gauge x 5/32" Ndle 1 each, Misc.(Non-Drug; Combo Route), 3 times daily    pen needle, diabetic 1 Units, Misc.(Non-Drug; Combo Route), 3 times daily before meals PRN    pregabalin (LYRICA) 75 mg, Oral, Daily    sertraline (ZOLOFT) 100 mg, Oral, Daily    sodium bicarbonate 650 mg, Oral, 3 times daily    vitamin renal formula, B-complex-vitamin c-folic acid, " (NEPHROCAP) 1 mg Cap 1 capsule, Oral, Daily       Past Surgical History:   Procedure Laterality Date    ABOVE-KNEE AMPUTATION Left 5/18/2021    Procedure: AMPUTATION, ABOVE KNEE;  Surgeon: Teddy Huber MD;  Location: [address];  Service: Vascular;  Laterality: Left;    ABOVE-KNEE AMPUTATION Right 3/18/2022    Procedure: AMPUTATION, ABOVE KNEE;  Surgeon: DAYNE Florez II, MD;  Location: [address];  Service: Vascular;  Laterality: Right;    Angiogram - Right Extremity Right 7/9/15    angiogram-left leg  10/6/15    ANGIOGRAPHY OF LOWER EXTREMITY Left 4/29/2021    Procedure: ANGIOGRAM, LOWER EXTREMITY;  Surgeon: Teddy Huber MD;  Location: [address];  Service: Vascular;  Laterality: Left;    BELOW KNEE AMPUTATION OF LOWER EXTREMITY Right 12/28/2021    Procedure: AMPUTATION, BELOW KNEE;  Surgeon: Kaitlyn Rojas MD;  Location: [facility];  Service: General;  Laterality: Right;    CATHETERIZATION OF BOTH LEFT AND RIGHT HEART N/A 12/18/2019    Procedure: CATHETERIZATION, HEART, BOTH LEFT AND RIGHT;  Surgeon: Que Fernando III, MD;  Location: [facility] CATH LAB;  Service: Cardiology;  Laterality: N/A;    CORONARY ANGIOGRAPHY N/A 12/18/2019    Procedure: ANGIOGRAM, CORONARY ARTERY;  Surgeon: Que Fernando III, MD;  Location: [facility] CATH LAB;  Service: Cardiology;  Laterality: N/A;    CORONARY ANGIOGRAPHY INCLUDING BYPASS GRAFTS WITH CATHETERIZATION OF LEFT HEART N/A 7/28/2020    Procedure: ANGIOGRAM, CORONARY, INCLUDING BYPASS GRAFT, WITH LEFT HEART CATHETERIZATION, 9 am;  Surgeon: Rachel Easley MD;  Location: [facility] CATH LAB;  Service: Cardiology;  Laterality: N/A;    CORONARY ARTERY BYPASS GRAFT (CABG) N/A 1/14/2020    Procedure: CORONARY ARTERY BYPASS GRAFT (CABG) x 1     Off Pump;  Surgeon: Huang Altamirano MD;  Location: [address];  Service: Cardiovascular;  Laterality: N/A;    CREATION OF FEMORAL-TIBIAL ARTERY BYPASS Left 4/29/2021    Procedure: CREATION, BYPASS,  ARTERIAL, FEMORAL TO ANTERIOR TIBIAL;  Surgeon: Teddy Huber MD;  [address];  Service: Vascular;  Laterality: Left;    CREATION OF FEMOROPOPLITEAL ARTERIAL BYPASS USING GRAFT Left 8/18/2020    Procedure: CREATION, BYPASS, ARTERIAL, FEMORAL TO POPLITEAL, USING GRAFT, LEFT LOWER EXTREMITY;  Surgeon: Teddy Huber MD;  Location: [facility];  Service: Vascular;  Laterality: Left;  REQUEST 7:15 A.M. START----COVID NEGATIVE ON 8/17  1ST CASE STARTE PER LEANA ON 8/7/2020 @ 942AM-LO  RN PREOP 8/12/2020   T/S-----CLEARED BY CARDS-------PENDING INSURANCE    DEBRIDEMENT OF FOOT Left 9/8/2020    Procedure: DEBRIDEMENT, FOOT;  Surgeon: Rosio Mayes DPM;  Location: [facility];  Service: Podiatry;  Laterality: Left;  request neoxx .   RN Pre Op 9-4-2020, Covid negative on 9/5/20. C A    DEBRIDEMENT OF FOOT  3/4/2021    Procedure: DEBRIDEMENT, FOOT;  Surgeon: Teddy Huber MD;  Location: [facility];  Service: Vascular;;    DEBRIDEMENT OF FOOT Left 3/9/2021    Procedure: DEBRIDEMENT, FOOT, bone biopsy;  Surgeon: Rosio Mayes DPM;  Location: [facility] OR;  Service: Podiatry;  Laterality: Left;  Request neoxx---COVID IN AM  REQUESTING NOON START  RN Phone Pre op.On Blood thinners Plavix and Eliquis.  Covid am of surgery. C A    DEBRIDEMENT OF FOOT Left 5/4/2021    Procedure: DEBRIDEMENT, FOOT;  Surgeon: Farooq Morley DPM;  [facility];  Service: Podiatry;  Laterality: Left;    ESOPHAGOGASTRODUODENOSCOPY N/A 11/7/2024    Procedure: EGD (ESOPHAGOGASTRODUODENOSCOPY);  Surgeon: Yony Cancino MD;  Location: [facility] ENDO;  Service: Endoscopy;  Laterality: N/A;    INSERTION OF TUNNELED CENTRAL VENOUS HEMODIALYSIS CATHETER N/A 1/27/2020    Procedure: Insertion, Catheter, Central Venous, Hemodialysis;  Surgeon: ESTEBAN Gomez III, MD;  Location: [facility] CATH LAB;  Service: Peripheral Vascular;  Laterality: N/A;    INSERTION OF TUNNELED CENTRAL VENOUS HEMODIALYSIS CATHETER  5/10/2023     Procedure: Insertion, Catheter, Central Venous, Hemodialysis;  Surgeon: Romulo Queen MD;  Location: [facility] CATH LAB;  Service: Cardiology;;    MAGNETIC RESONANCE IMAGING Left 11/19/2024    Procedure: MRI (Magnetic Resonance Imagine);  Surgeon: Sophia Hernandez;  Location: [facility] SOPHIA;  Service: Anesthesiology;  Laterality: Left;    PERCUTANEOUS TRANSLUMINAL ANGIOPLASTY N/A 3/4/2021    Procedure: PTA (ANGIOPLASTY, PERCUTANEOUS, TRANSLUMINAL);  Surgeon: Teddy Huber MD;  Location: [facility] OR;  Service: Vascular;  Laterality: N/A;    REMOVAL OF ARTERIOVENOUS GRAFT Left 5/27/2021    Procedure: REMOVAL, GRAFT, LEFT LOWER EXTREMITY, WOUND EXPLORATION;  Surgeon: Teddy Huber MD;  Location: [facility] OR 2ND FLR;  Service: Vascular;  Laterality: Left;    REMOVAL OF NAIL OF DIGIT Left 3/9/2021    Procedure: REMOVAL, NAIL, DIGIT;  Surgeon: Rosio Mayes DPM;  Location: [facility] OR;  Service: Podiatry;  Laterality: Left;    RIGHT HEART CATHETERIZATION Right 5/10/2023    Procedure: INSERTION, CATHETER, RIGHT HEART;  Surgeon: Romulo Queen MD;  Location: [facility] CATH LAB;  Service: Cardiology;  Laterality: Right;    THROMBECTOMY Left 3/4/2021    Procedure: THROMBECTOMY, LEFT LOWER EXTREMITY BYPASS GRAFT, ANGIOGRAM, POSSIBLE INTERVENTION, POSSIBLE LEFT LOWER EXTREMITY BYPASS;  Surgeon: Teddy Huber MD;  Location: [facility] OR;  Service: Vascular;  Laterality: Left;    THROMBECTOMY Left 4/29/2021    Procedure: GRAFT THROMBECTOMY, LEFT LOWER EXTREMITY;  Surgeon: Teddy Huber MD;  Location: [facility] OR 2ND FLR;  Service: Vascular;  Laterality: Left;  14.5 min  1179.85 mGy  341.01 Gycm2  240 ml dye    THROMBECTOMY  10/22/2021    Procedure: THROMBECTOMY;  Surgeon: Saad Arenas MD;  Location: [facility] CATH LAB/EP;  Service: Cardiology;;       Social History     Substance and Sexual Activity   Drug Use Yes    Types: Marijuana    Comment: occassional     Alcohol Use: Not At Risk (11/14/2024)    AUDIT-C     Frequency of Alcohol  Consumption: Patient declined     Average Number of Drinks: Patient does not drink     Frequency of Binge Drinking: Never     Tobacco Use: Medium Risk (11/19/2024)    Patient History     Smoking Tobacco Use: Former     Smokeless Tobacco Use: Never     Passive Exposure: Not on file       OBJECTIVE:     Vital Signs Range:      11/13/2024     6:34 PM 11/13/2024    11:51 PM 11/15/2024    12:59 PM   Vitals - 1 value per visit   SYSTOLIC 151  125   DIASTOLIC 87  81   Pulse 76  85   Temp 36.7 °C (98.1 °F)  36.9 °C (98.4 °F)   Resp 18  18   SPO2 100 %  99 %   Weight (lb)  156 156   Weight (kg)  70.761 70.761   BMI (Calculated)  47.6          CBC:   Lab Results   Component Value Date    WBC 6.72 11/21/2024    HGB 7.0 (L) 11/21/2024    HCT 21.5 (L) 11/21/2024    MCV 96 11/21/2024     11/21/2024         CMP:   Sodium   Date Value Ref Range Status   11/21/2024 124 (L) 136 - 145 mmol/L Final     Potassium   Date Value Ref Range Status   11/21/2024 4.4 3.5 - 5.1 mmol/L Final     Chloride   Date Value Ref Range Status   11/21/2024 92 (L) 95 - 110 mmol/L Final     CO2   Date Value Ref Range Status   11/21/2024 21 (L) 23 - 29 mmol/L Final     Glucose   Date Value Ref Range Status   11/21/2024 95 70 - 110 mg/dL Final     BUN   Date Value Ref Range Status   11/21/2024 32 (H) 6 - 20 mg/dL Final     Creatinine   Date Value Ref Range Status   11/21/2024 3.5 (H) 0.5 - 1.4 mg/dL Final     Calcium   Date Value Ref Range Status   11/21/2024 7.9 (L) 8.7 - 10.5 mg/dL Final     Total Protein   Date Value Ref Range Status   11/15/2024 8.6 (H) 6.0 - 8.4 g/dL Final     Albumin   Date Value Ref Range Status   11/21/2024 1.9 (L) 3.5 - 5.2 g/dL Final     Total Bilirubin   Date Value Ref Range Status   11/15/2024 0.4 0.1 - 1.0 mg/dL Final     Comment:     For infants and newborns, interpretation of results should be based  on gestational age, weight and in agreement with clinical  observations.    Premature Infant recommended reference  ranges:  Up to 24 hours.............<8.0 mg/dL  Up to 48 hours............<12.0 mg/dL  3-5 days..................<15.0 mg/dL  6-29 days.................<15.0 mg/dL       Alkaline Phosphatase   Date Value Ref Range Status   11/15/2024 362 (H) 40 - 150 U/L Final     AST   Date Value Ref Range Status   11/15/2024 38 10 - 40 U/L Final     ALT   Date Value Ref Range Status   11/15/2024 19 10 - 44 U/L Final     Anion Gap   Date Value Ref Range Status   11/21/2024 11 8 - 16 mmol/L Final     eGFR if    Date Value Ref Range Status   03/27/2022 29.9 (A) >60 mL/min/1.73 m^2 Final     eGFR    Date Value Ref Range Status   01/01/2024 8 mL/min/1.73mSq Final     Comment:     In accordance with NKF-ASN Task Force recommendation, calculation based on the Chronic Kidney Disease Epidemiology Collaboration (CKD-EPI) equation without adjustment for race. eGFR adjusted for gender and age and calculated in ml/min/1.73mSquared. eGFR cannot be calculated if patient is under 18 years of age.     Reference Range:   >= 60 ml/min/1.73mSquared.     eGFR if non    Date Value Ref Range Status   03/27/2022 25.9 (A) >60 mL/min/1.73 m^2 Final     Comment:     Calculation used to obtain the estimated glomerular filtration  rate (eGFR) is the CKD-EPI equation.          INR:  Lab Results   Component Value Date    INR 1.4 (H) 10/29/2024    INR 1.2 09/30/2024    INR 1.4 (H) 05/28/2024       Cardiac Studies    EKG:   Results for orders placed or performed during the hospital encounter of 11/15/24   EKG 12-lead    Collection Time: 11/15/24  3:27 PM   Result Value Ref Range    QRS Duration 88 ms    [facility] QTC Calculation 486 ms    Narrative    Test Reason : M25.569,    Vent. Rate :  79 BPM     Atrial Rate :  79 BPM     P-R Int : 166 ms          QRS Dur :  88 ms      QT Int : 424 ms       P-R-T Axes :  29  39 124 degrees    QTcB Int : 486 ms    Sinus rhythm with occasional Premature ventricular complexes and  Premature  atrial complexes  Abnormal R wave progression in the precordial leads  Abnormal ECG  When compared with ECG of 13-Nov-2024 21:31,  Premature ventricular complexes are now Present  ST no longer depressed in Lateral leads  Nonspecific T wave abnormality no longer evident in Inferior leads  Nonspecific T wave abnormality no longer evident in Anterior leads  Confirmed by Roosevelt Schmitt ([id number]) on 11/15/2024 3:56:09 PM    Referred By: AAAREFERRAL SELF           Confirmed By: Roosevelt Schmitt       Transthoracic Echo:  Results for orders placed during the hospital encounter of 10/28/24    Echo Saline Bubble? No; Ultrasound enhancing contrast? No    Interpretation Summary    Left Ventricle: The left ventricle is normal in size. Mildly increased wall thickness. Regional wall motion abnormalities present. Septal motion is abnormal is consistent with bundle branch block.    Right Ventricle: Severe right ventricular enlargement. Systolic function is severely reduced.    Left Atrium: Atrial septum is bulging to the left.    Right Atrium: Right atrium is severely dilated.    Aortic Valve: The aortic valve is a trileaflet valve. There is mild aortic valve sclerosis. There is mild aortic regurgitation with a centrally directed jet.    Mitral Valve: There is mild regurgitation with a centrally directed jet.    Tricuspid Valve: There is severe regurgitation with a centrally directed jet.    Pulmonary Artery: No pulmonary hypertension. The estimated pulmonary artery systolic pressure is 26 mmHg.    IVC/SVC: Elevated venous pressure at 15 mmHg.      Transesophageal Echo:  No results found for this or any previous visit.      Nuclear Stress Test:  No results found for this or any previous visit.      Stress Echo:  No results found for this or any previous visit.      Nuclear Stress Echo:  No results found for this or any previous visit.      Cardiac Catheterization:  Results for orders placed during the hospital encounter of  05/01/23    Cardiac catheterization    Narrative  Procedure performed in the Invasive Lab  - See Procedure Log link below for nursing documentation  - See OpNote on Surgeries Tab for physician findings  - See Imaging Tab for radiologist dictation      Cardiac Device Check:  No results found for this or any previous visit.      ASSESSMENT/PLAN:                                                                                                                11/21/2024  Suyapa Connelly is a 59 y.o., female.    Pre-op Assessment    I have reviewed the Patient Summary Reports.     I have reviewed the Nursing Notes. I have reviewed the NPO Status.   I have reviewed the Medications.     Review of Systems  Cardiovascular:        CAD   CABG/stent    CHF                                   Renal/:  Chronic Renal Disease, ESRD, Dialysis                Hepatic/GI:     GERD                Endocrine:  Diabetes               Physical Exam  General: Alert and Oriented    Airway:  Mallampati: II / II  Mouth Opening: Normal  TM Distance: Normal  Tongue: Normal  Neck ROM: Normal ROM    Dental:  Intact, Periodontal disease        Anesthesia Plan  Type of Anesthesia, risks & benefits discussed:    Anesthesia Type: Gen ETT, Gen Natural Airway, MAC  Intra-op Monitoring Plan: Standard ASA Monitors  Post Op Pain Control Plan: IV/PO Opioids PRN and multimodal analgesia  Induction:  IV  Airway Plan: Direct and Video  Informed Consent: Informed consent signed with the Patient and all parties understand the risks and agree with anesthesia plan.  All questions answered.   ASA Score: 4  Day of Surgery Review of History & Physical: H&P Update referred to the surgeon/provider.    Ready For Surgery From Anesthesia Perspective.     .

## 2024-11-21 NOTE — CONSULTS
Pharmacokinetic Assessment Follow Up: IV Vancomycin    Vancomycin Regimen Assessment/ Plan:    Random level resulted as 18.2 mcg/mL  Goal level 10-15 mcg/mL  ESRD on HD TTS. Last HD on 11/21  Will not dose additional Vancomycin today.   Re-dose when level < 15 mcg/mL  Next random level due 11/23 at 0400 with AM labs (prior to next HD)    Drug levels (last 3 results):  Recent Labs   Lab Result Units 11/19/24  0944 11/21/24  0434   Vancomycin, Random ug/mL 12.2 18.2       Pharmacy will continue to follow and monitor vancomycin.    Please contact pharmacy at extension [phone] for questions regarding this assessment.    Thank you for the consult,   Sarah Diego, PharmD       Patient brief summary:  Suyapa Connelly is a 59 y.o. female initiated on antimicrobial therapy with IV Vancomycin for treatment of skin & soft tissue infection    Drug Allergies:   Review of patient's allergies indicates:   Allergen Reactions    Ciprofloxacin Itching    Pcn [penicillins]      Rash; tolerated ceftriaxone on 1/13/20  Tolerating Augmentin November 2024       Actual Body Weight:   71.2 kg    Renal Function:   Estimated Creatinine Clearance: 13.2 mL/min (A) (based on SCr of 3.5 mg/dL (H)).,     Dialysis Method (if applicable):  N/A    CBC (last 72 hours):  Recent Labs   Lab Result Units 11/19/24  0242 11/20/24 0445 11/21/24  0434   WBC K/uL 7.17 7.48 6.72   Hemoglobin g/dL 7.0* 7.2* 7.0*   Hematocrit % 22.3* 21.9* 21.5*   Platelets K/uL 364 327 294       Metabolic Panel (last 72 hours):  Recent Labs   Lab Result Units 11/19/24  0242 11/19/24  0838 11/19/24  1152 11/20/24  0445 11/21/24  0434 11/21/24  0810   Sodium mmol/L 127*  --   --  127* 124* 124*   Potassium mmol/L 4.2  --   --  4.0 4.4  --    Chloride mmol/L 93*  --   --  95 92*  --    CO2 mmol/L 24  --   --  22* 21*  --    Glucose mg/dL 115*  --   --  181* 95  --    BUN mg/dL 46* 46* 15 27* 32*  --    Creatinine mg/dL 4.8*  --   --  3.0* 3.5*  --    Albumin g/dL 1.8*  --   --  1.8*  1.9*  --    Magnesium mg/dL 1.8  --   --  1.8 1.8  --    Phosphorus mg/dL 5.5*  --   --  3.7 4.2  --        Vancomycin Administrations:  vancomycin given in the last 96 hours                     vancomycin (VANCOCIN) 500 mg in D5W 100 mL IVPB (MB+) (mg) 500 mg New Bag 11/20/24 1500                    Microbiologic Results:  Microbiology Results (last 7 days)       Procedure Component Value Units Date/Time    Blood culture x two cultures. Draw prior to antibiotics. [[id number]] Collected: 11/15/24 1543    Order Status: Completed Specimen: Blood from Peripheral, Antecubital, Left Updated: 11/20/24 1812     Blood Culture, Routine No growth after 5 days.    Narrative:      Aerobic and anaerobic    Blood culture x two cultures. Draw prior to antibiotics. [[id number]] Collected: 11/15/24 1543    Order Status: Completed Specimen: Blood from Peripheral, Forearm, Right Updated: 11/20/24 1812     Blood Culture, Routine No growth after 5 days.    Narrative:      Aerobic and anaerobic

## 2024-11-21 NOTE — ASSESSMENT & PLAN NOTE
First MR non diagnostic due to motion artifact  Repeat MR with sedation postoperative changes from above the knee amputation, 3.5 cm fluid collection at the femoral stump, which could represent a seroma, hematoma, or abscess; no evidence of osteomyelitis.  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  The external iliac artery is patent with diffuse plaque approximately 60-80% diameter narrowing.  The left common femoral is patent.  Multiple calcifications at the bifurcation.  Occluded left SFA bypass graft. Native left SFA also is occluded.  Severe plaque extends into the profundus femoral branches.  Only collateral vessels are identified in the left thigh.  Diffuse calcified plaque in the occluded left SFA noted.    Cont vanc/cefepime  Surgery consulted in ED; no current plans for intervention  Follow cultures if available  ID consulted, appreciate recommendations  Vascular surgery consulted, appreciate recommendations  Tentative plans for debridement 11.22.24

## 2024-11-21 NOTE — NURSING
Plan of care reviewed with the patient . Understanding verbalized.No acute distress observed.Safety Maintained.Bed at lowest position.Call bell at reach.Bed side table at reach.Instructed Patient to call for assistance whenever required. Wounds cleansed and secured with sterile gauges and mepilex . Pain management as per need and regimen . Patient;'s wound status discussed with MD on duty . Placed back follow up wound care consult as no new orders are present for nursing wound care .  at bedside . Patient calm , awake , alert and oriented without observable distress . Monitoring .

## 2024-11-21 NOTE — NURSING
3.5 hours HD tx completed. 2 L of fluid removed.    Patient tolerated well.    Blood returned. Lines flushed with NS. Catheter locked with Heparin. Clamped, capped and wrapped with sterile gauze.    Report given to EMILY Hdez LPN.

## 2024-11-21 NOTE — PROGRESS NOTES
[facility] Medicine  Progress Note    Patient Name: Suyapa Connelly  MRN: [id number]  Patient Class: IP- Inpatient   Admission Date: 11/15/2024  Length of Stay: 5 days  Attending Physician: Cody Traore DO  Primary Care Provider: Vanessa Noel MD        Subjective:     Principal Problem:Cellulitis of left thigh        HPI:  58yo female whose PMH includes bilateral BKA, ESRD, DM, HTN, HLD presents with purulence from L stump wound.  She was discharged from this facility 11.12.24 with oral abx or LLE cellulitis after bring admitted for septic shock.  During that admission general surgery was consulted for further evaluation d/t concerns for nec fasc as there was subcutaneous emphysema noted on CT; after their clinical assessment nec fasc was ruled out.  She did receive IV abx during that admission and on discharge was transitioned to oral augmentin and doxy and she went to SNF.  She was sent back to the ED for continued purulence and LLE pain.  Surgery was consulted in the ED and have no plans for intervention at this time.  MR is pending.  White count is normal.  She has been admitted to medicine for further management.    Overview/Hospital Course:  Admitted for LLE cellulitis; failed outpatient oral abx.  Discharged from this facility 11.12.24 with oral abx or LLE cellulitis after bring admitted for septic shock; on discharge was transitioned to oral augmentin and doxy and she went to SNF.  Sent back to the ED for continued purulence and LLE pain.  Surgery was consulted in the ED and have no plans for intervention at this time.  Repeat CT here showed postop change of bilateral above knee amputation, no destructive osseous process to suggest osteomyelitis in the remaining portions of the left femur or in the visualized proximal portion of the cut right femur, skin defect along the medial aspect of the left thigh with foci of air underlying the wound, additional area of soft tissue contour  indentation and focal skin thickening along the anterior thigh, suggestive of possible infectious or inflammatory process versus region of scarring, additional extensive soft tissue edema and skin thickening of the pelvis, partially visualized right lower extremity, and left lower extremity.  MR non diagnostic due to motion artifact.  White count is normal.  Receiving vanc/cefepime; ID consulted.  Repeat MR with sedation postoperative changes from above the knee amputation, 3.5 cm fluid collection at the femoral stump, which could represent a seroma, hematoma, or abscess; no evidence of osteomyelitis.  There are two fluid collections in the left groin measuring up to 2.4 cm, diffuse subcutaneous and intermuscular edema, which could be inflammatory or infectious, diffuse muscle atrophy with corresponding edema like signal, which could represent denervation or myositis.  CTA with runoff showed on the left, there is occlusion of the proximal left common iliac artery with reconstitution more distally though again diffuse circumferential severe plaque involves both the internal and external iliac arteries.  The external iliac artery is patent with diffuse plaque approximately 60-80% diameter narrowing.  The left common femoral is patent.  Multiple calcifications at the bifurcation.  Occluded left SFA bypass graft. Native left SFA also is occluded.  Severe plaque extends into the profundus femoral branches.  Only collateral vessels are identified in the left thigh.  Diffuse calcified plaque in the occluded left SFA noted.  Other findings noted in the result report.  Vascular surgery consulted; tentative plan for intervention 11.22.24.    Interval History:  Seen and evaluated in HD  No acute events overnight    Clinical status improved  No new complaints  She is always sleepy during HD  NPO MN for surgical intervention    Objective:     Vital Signs (Most Recent):  Temp: 97.7 °F (36.5 °C) (11/21/24 0621)  Pulse: 78  (11/21/24 1130)  Resp: 20 (11/21/24 0621)  BP: (!) 115/57 (11/21/24 1130)  SpO2: 95 % (11/21/24 0621) Vital Signs (24h Range):  Temp:  [97.7 °F (36.5 °C)-98.5 °F (36.9 °C)] 97.7 °F (36.5 °C)  Pulse:  [69-84] 78  Resp:  [18-20] 20  SpO2:  [95 %-98 %] 95 %  BP: ()/(47-79) 115/57     Weight: 71.2 kg (156 lb 15.5 oz)  Body mass index is 47.9 kg/m².    Intake/Output Summary (Last 24 hours) at 11/21/2024 1218  Last data filed at 11/21/2024 1130  Gross per 24 hour   Intake --   Output 2650 ml   Net -2650 ml         Physical Exam  Constitutional:       Appearance: She is ill-appearing (chronically).   HENT:      Head: Normocephalic and atraumatic.      Right Ear: External ear normal.      Left Ear: External ear normal.   Eyes:      Extraocular Movements: Extraocular movements intact.      Conjunctiva/sclera: Conjunctivae normal.      Pupils: Pupils are equal, round, and reactive to light.   Cardiovascular:      Rate and Rhythm: Normal rate and regular rhythm.   Pulmonary:      Effort: Pulmonary effort is normal. No respiratory distress.   Abdominal:      General: Abdomen is flat. Bowel sounds are normal.   Musculoskeletal:         General: Tenderness (LLE) and deformity (bilateral BKA) present.   Skin:     Findings: No lesion.          Neurological:      General: No focal deficit present.      Mental Status: She is alert and oriented to person, place, and time.   Psychiatric:         Mood and Affect: Mood normal.             Significant Labs: All pertinent labs within the past 24 hours have been reviewed.    Significant Imaging: I have reviewed all pertinent imaging results/findings within the past 24 hours.    Assessment/Plan:      * Cellulitis of left thigh  First MR non diagnostic due to motion artifact  Repeat MR with sedation postoperative changes from above the knee amputation, 3.5 cm fluid collection at the femoral stump, which could represent a seroma, hematoma, or abscess; no evidence of osteomyelitis.  There  are two fluid collections in the left groin measuring up to 2.4 cm, diffuse subcutaneous and intermuscular edema, which could be inflammatory or infectious, diffuse muscle atrophy with corresponding edema like signal, which could represent denervation or myositis  CTA with runoff showed on the left, there is occlusion of the proximal left common iliac artery with reconstitution more distally though again diffuse circumferential severe plaque involves both the internal and external iliac arteries.  The external iliac artery is patent with diffuse plaque approximately 60-80% diameter narrowing.  The left common femoral is patent.  Multiple calcifications at the bifurcation.  Occluded left SFA bypass graft. Native left SFA also is occluded.  Severe plaque extends into the profundus femoral branches.  Only collateral vessels are identified in the left thigh.  Diffuse calcified plaque in the occluded left SFA noted.    Cont vanc/cefepime  Surgery consulted in ED; no current plans for intervention  Follow cultures if available  ID consulted, appreciate recommendations  Vascular surgery consulted, appreciate recommendations  Tentative plans for debridement 11.22.24    Severe obesity (BMI >= 40)  Body mass index is 47.6 kg/m².  Would benefit from dietary and lifestyle modifications in relation to health  Consider dietary/nutrition consult outpatient        ESRD (end stage renal disease)  Consult nephro for HD management  HD TThS    Paroxysmal atrial fibrillation  Cont eliquis    Anemia due to chronic kidney disease, on chronic dialysis  Close monitoring  No indication for transfusion at this time  Goal directed resuscitation, transfuse for Hgb<7    CAD (coronary artery disease)  Cont plavix, eliquis  Cont statin    Type 2 diabetes mellitus with hyperglycemia, with long-term current use of insulin  Slide  Diabetic diet when not NPO  ACHS glucose checks    CAROLIN (generalized anxiety disorder)  Cont zoloft    Peripheral vascular  disease  Cont statin  Cont eliquis and plavix  See plan for cellulitis      VTE Risk Mitigation (From admission, onward)           Ordered     heparin (porcine) injection 5,000 Units  Every 8 hours         11/20/24 1622     IP VTE HIGH RISK PATIENT  Once         11/20/24 1622     heparin (porcine) injection 1,000 Units  As needed (PRN)         11/16/24 1159     Place sequential compression device  Until discontinued         11/15/24 1648     Reason for No Pharmacological VTE Prophylaxis  Once        Question:  Reasons:  Answer:  Already adequately anticoagulated on oral Anticoagulants    11/15/24 1648                    Discharge Planning   DEVAN: 11/24/2024     Code Status: Full Code   Is the patient medically ready for discharge?:     Reason for patient still in hospital (select all that apply): Patient trending condition  Discharge Plan A: Return to nursing home                  Cody Traore DO  Department of Hospital Medicine   [facility]

## 2024-11-21 NOTE — ASSESSMENT & PLAN NOTE
"58 y/o female PMHx: bilateral BKA, ESRD on HD, DM, HTN, HLD presents with purulence from L stump wound. Recently admitted and received IV abx during that admission and on discharge was transitioned to oral augmentin and doxy and she went to SNF.  She was sent back to the ED on 11/15 for continued purulence and LLE pain.  She has been admitted to hospital medicine for further management.    ID consulted for "antibiotic management"    Blood cultures from 11/15 NGTD    Recommendations / Plan  Continue empiric vanc and Cefepime 1 g q24 hrs  Recommend wound care  Will f/u with further imaging (ordered)  Vascular surgery plans to take pt for debridement alex (11/22), will f/u    -- Discussed with ID staff Dr. Vidal and primary team  -- ID will continue to follow for further recs    "

## 2024-11-21 NOTE — PROGRESS NOTES
"[facility]  Infectious Disease  Progress Note    Patient Name: Suyapa Connelly  MRN: [id number]  Admission Date: 11/15/2024  Length of Stay: 5 days  Attending Physician: Cody Traore DO  Primary Care Provider: Vanessa Noel MD    Isolation Status: No active isolations  Assessment/Plan:      ID  * Cellulitis of left thigh  60 y/o female PMHx: bilateral BKA, ESRD on HD, DM, HTN, HLD presents with purulence from L stump wound. Recently admitted and received IV abx during that admission and on discharge was transitioned to oral augmentin and doxy and she went to SNF.  She was sent back to the ED on 11/15 for continued purulence and LLE pain.  She has been admitted to hospital medicine for further management.    ID consulted for "antibiotic management"    Blood cultures from 11/15 NGTD    Recommendations / Plan  Continue empiric vanc and Cefepime 1 g q24 hrs  Recommend wound care  Will f/u with further imaging (ordered)  Vascular surgery plans to take pt for debridement alex (11/22), will f/u    -- Discussed with ID staff Dr. Vidal and primary team  -- ID will continue to follow for further recs          Anticipated Disposition: TBD    Thank you for your consult. I will follow-up with patient. Please contact us if you have any additional questions.    ADRIAN Abdi  Infectious Disease  [facility]    Subjective:     Principal Problem:Cellulitis of left thigh    HPI: 60 y/o female PMHx: bilateral BKA, ESRD on HD, DM, HTN, HLD presents with purulence from L stump wound.  She was discharged from this facility 11/12/24 with oral abx for LLE cellulitis after bring admitted for septic shock.  During that admission there were concerns for nec fasc as there was subcutaneous emphysema noted on CT, however, nec fasc was ruled out.  She did receive IV abx during that admission and on dishcharge was transitioned to oral augmentin and doxy and she went to SNF.  She was sent back to the ED for " continued purulence and LLE pain. She has been admitted to hospital medicine for further management.      -- ID will continue to follow for further recs    Interval History: NAEON  Afebrile  No leukocytosis    Review of Systems   Constitutional:  Negative for appetite change, chills, fatigue and fever.   Respiratory:  Negative for chest tightness, shortness of breath and wheezing.    Cardiovascular:  Negative for chest pain and palpitations.   Gastrointestinal:  Negative for abdominal distention, abdominal pain, constipation, diarrhea, nausea and vomiting.   Genitourinary:  Negative for decreased urine volume, difficulty urinating, dysuria, flank pain, frequency and urgency.   Musculoskeletal:  Negative for back pain.   Skin:  Negative for rash.     Objective:     Vital Signs (Most Recent):  Temp: 97.7 °F (36.5 °C) (11/21/24 0621)  Pulse: 78 (11/21/24 1130)  Resp: 18 (11/21/24 1232)  BP: (!) 115/57 (11/21/24 1130)  SpO2: 95 % (11/21/24 0621) Vital Signs (24h Range):  Temp:  [97.7 °F (36.5 °C)-98.5 °F (36.9 °C)] 97.7 °F (36.5 °C)  Pulse:  [69-84] 78  Resp:  [18-20] 18  SpO2:  [95 %-98 %] 95 %  BP: (100-158)/(49-79) 115/57     Weight: 71.2 kg (156 lb 15.5 oz)  Body mass index is 47.9 kg/m².    Estimated Creatinine Clearance: 13.2 mL/min (A) (based on SCr of 3.5 mg/dL (H)).     Physical Exam  Vitals and nursing note reviewed.   Constitutional:       General: She is not in acute distress.     Appearance: Normal appearance. She is not ill-appearing.   HENT:      Head: Normocephalic.      Nose: Nose normal.   Cardiovascular:      Rate and Rhythm: Normal rate and regular rhythm.      Heart sounds: Normal heart sounds. No murmur heard.  Pulmonary:      Effort: Pulmonary effort is normal. No respiratory distress.      Breath sounds: No wheezing.   Abdominal:      General: There is no distension.      Tenderness: There is no abdominal tenderness.   Musculoskeletal:      Right Lower Extremity: Right leg is amputated above  knee.      Left Lower Extremity: Left leg is amputated above knee. (foam dressing CDI, minimal purulent drainage)  Skin:     Findings: No lesion or rash.   Neurological:      Mental Status: She is alert and oriented to person, place, and time.   Psychiatric:      Comments: tearful          Significant Labs: All pertinent labs within the past 24 hours have been reviewed.    Significant Imaging: I have reviewed all pertinent imaging results/findings within the past 24 hours.

## 2024-11-21 NOTE — SUBJECTIVE & OBJECTIVE
Interval History: NAEON  Afebrile  No leukocytosis    Review of Systems   Constitutional:  Negative for appetite change, chills, fatigue and fever.   Respiratory:  Negative for chest tightness, shortness of breath and wheezing.    Cardiovascular:  Negative for chest pain and palpitations.   Gastrointestinal:  Negative for abdominal distention, abdominal pain, constipation, diarrhea, nausea and vomiting.   Genitourinary:  Negative for decreased urine volume, difficulty urinating, dysuria, flank pain, frequency and urgency.   Musculoskeletal:  Negative for back pain.   Skin:  Negative for rash.     Objective:     Vital Signs (Most Recent):  Temp: 97.7 °F (36.5 °C) (11/21/24 0621)  Pulse: 78 (11/21/24 1130)  Resp: 18 (11/21/24 1232)  BP: (!) 115/57 (11/21/24 1130)  SpO2: 95 % (11/21/24 0621) Vital Signs (24h Range):  Temp:  [97.7 °F (36.5 °C)-98.5 °F (36.9 °C)] 97.7 °F (36.5 °C)  Pulse:  [69-84] 78  Resp:  [18-20] 18  SpO2:  [95 %-98 %] 95 %  BP: (100-158)/(49-79) 115/57     Weight: 71.2 kg (156 lb 15.5 oz)  Body mass index is 47.9 kg/m².    Estimated Creatinine Clearance: 13.2 mL/min (A) (based on SCr of 3.5 mg/dL (H)).     Physical Exam  Vitals and nursing note reviewed.   Constitutional:       General: She is not in acute distress.     Appearance: Normal appearance. She is not ill-appearing.   HENT:      Head: Normocephalic.      Nose: Nose normal.   Cardiovascular:      Rate and Rhythm: Normal rate and regular rhythm.      Heart sounds: Normal heart sounds. No murmur heard.  Pulmonary:      Effort: Pulmonary effort is normal. No respiratory distress.      Breath sounds: No wheezing.   Abdominal:      General: There is no distension.      Tenderness: There is no abdominal tenderness.   Musculoskeletal:      Right Lower Extremity: Right leg is amputated above knee.      Left Lower Extremity: Left leg is amputated above knee. (foam dressing CDI, minimal purulent drainage)  Skin:     Findings: No lesion or rash.    Neurological:      Mental Status: She is alert and oriented to person, place, and time.   Psychiatric:      Comments: tearful          Significant Labs: All pertinent labs within the past 24 hours have been reviewed.    Significant Imaging: I have reviewed all pertinent imaging results/findings within the past 24 hours.

## 2024-11-21 NOTE — SUBJECTIVE & OBJECTIVE
Interval History:  Seen and evaluated in HD  No acute events overnight    Clinical status improved  No new complaints  She is always sleepy during HD  NPO MN for surgical intervention    Objective:     Vital Signs (Most Recent):  Temp: 97.7 °F (36.5 °C) (11/21/24 0621)  Pulse: 78 (11/21/24 1130)  Resp: 20 (11/21/24 0621)  BP: (!) 115/57 (11/21/24 1130)  SpO2: 95 % (11/21/24 0621) Vital Signs (24h Range):  Temp:  [97.7 °F (36.5 °C)-98.5 °F (36.9 °C)] 97.7 °F (36.5 °C)  Pulse:  [69-84] 78  Resp:  [18-20] 20  SpO2:  [95 %-98 %] 95 %  BP: ()/(47-79) 115/57     Weight: 71.2 kg (156 lb 15.5 oz)  Body mass index is 47.9 kg/m².    Intake/Output Summary (Last 24 hours) at 11/21/2024 1218  Last data filed at 11/21/2024 1130  Gross per 24 hour   Intake --   Output 2650 ml   Net -2650 ml         Physical Exam  Constitutional:       Appearance: She is ill-appearing (chronically).   HENT:      Head: Normocephalic and atraumatic.      Right Ear: External ear normal.      Left Ear: External ear normal.   Eyes:      Extraocular Movements: Extraocular movements intact.      Conjunctiva/sclera: Conjunctivae normal.      Pupils: Pupils are equal, round, and reactive to light.   Cardiovascular:      Rate and Rhythm: Normal rate and regular rhythm.   Pulmonary:      Effort: Pulmonary effort is normal. No respiratory distress.   Abdominal:      General: Abdomen is flat. Bowel sounds are normal.   Musculoskeletal:         General: Tenderness (LLE) and deformity (bilateral BKA) present.   Skin:     Findings: No lesion.          Neurological:      General: No focal deficit present.      Mental Status: She is alert and oriented to person, place, and time.   Psychiatric:         Mood and Affect: Mood normal.             Significant Labs: All pertinent labs within the past 24 hours have been reviewed.    Significant Imaging: I have reviewed all pertinent imaging results/findings within the past 24 hours.

## 2024-11-21 NOTE — PROGRESS NOTES
[facility] STAFF HEMODIALYSIS NOTE     Patient currently on hemodialysis for removal of uremic toxins and volume.     Patient seen and evaluated on hemodialysis, tolerating treatment, see HD flowsheet for vitals and assessments.    Labs have been reviewed and the dialysate bath has been adjusted.       Assessment/Plan:    -Hyponatremia in setting of ESRD, 1L fluid restrictions. Adjust NA bath   -Patient seen on HD, tolerating treatment well, w/o complaints   -UF goal of 2L  -Renal diet, if not NPO   -Strict I/O's and daily weights  -Daily renal function panels  -Keep MAP >65 while on HD   -Hgb goal 10-11Continue epo with HD   -continue sevelamer   -continue calcitriol   -continue cinacalcet   -Will continue to follow while inpatient     Katie Monique DNP-FNP, C  Nephrology  Pager: [phone]

## 2024-11-21 NOTE — NURSING
Patient arrived in a bed to dialysis unit.     Report received from EMILY Hdez LPN.     Hemodialysis initiated using the following:  Dialysis Access: Left Subclavian Catheter     Tolerated well, flows good.     UF goal 2 L as tolerated

## 2024-11-22 ENCOUNTER — ANESTHESIA (OUTPATIENT)
Dept: SURGERY | Facility: HOSPITAL | Age: 59
End: 2024-11-22
Payer: MEDICARE

## 2024-11-22 LAB
ABO + RH BLD: ABNORMAL
ALBUMIN SERPL BCP-MCNC: 1.9 G/DL (ref 3.5–5.2)
ANION GAP SERPL CALC-SCNC: 9 MMOL/L (ref 8–16)
BLD GP AB SCN CELLS X3 SERPL QL: ABNORMAL
BUN SERPL-MCNC: 19 MG/DL (ref 6–20)
CALCIUM SERPL-MCNC: 8.1 MG/DL (ref 8.7–10.5)
CHLORIDE SERPL-SCNC: 94 MMOL/L (ref 95–110)
CO2 SERPL-SCNC: 24 MMOL/L (ref 23–29)
CREAT SERPL-MCNC: 2.6 MG/DL (ref 0.5–1.4)
ERYTHROCYTE [DISTWIDTH] IN BLOOD BY AUTOMATED COUNT: 19.2 % (ref 11.5–14.5)
EST. GFR  (NO RACE VARIABLE): 20.7 ML/MIN/1.73 M^2
GLUCOSE SERPL-MCNC: 115 MG/DL (ref 70–110)
HCT VFR BLD AUTO: 22.6 % (ref 37–48.5)
HGB BLD-MCNC: 7.3 G/DL (ref 12–16)
MAGNESIUM SERPL-MCNC: 1.9 MG/DL (ref 1.6–2.6)
MCH RBC QN AUTO: 31.6 PG (ref 27–31)
MCHC RBC AUTO-ENTMCNC: 32.3 G/DL (ref 32–36)
MCV RBC AUTO: 98 FL (ref 82–98)
PHOSPHATE SERPL-MCNC: 3.1 MG/DL (ref 2.7–4.5)
PLATELET # BLD AUTO: 301 K/UL (ref 150–450)
PMV BLD AUTO: 10.5 FL (ref 9.2–12.9)
POC ACTIVATED CLOTTING TIME K: 210 SEC (ref 74–137)
POCT GLUCOSE: 112 MG/DL (ref 70–110)
POCT GLUCOSE: 69 MG/DL (ref 70–110)
POCT GLUCOSE: 92 MG/DL (ref 70–110)
POCT GLUCOSE: 94 MG/DL (ref 70–110)
POCT GLUCOSE: 97 MG/DL (ref 70–110)
POTASSIUM SERPL-SCNC: 4.2 MMOL/L (ref 3.5–5.1)
RBC # BLD AUTO: 2.31 M/UL (ref 4–5.4)
SAMPLE: ABNORMAL
SODIUM SERPL-SCNC: 127 MMOL/L (ref 136–145)
SPECIMEN OUTDATE: ABNORMAL
WBC # BLD AUTO: 8.25 K/UL (ref 3.9–12.7)

## 2024-11-22 PROCEDURE — 25000003 PHARM REV CODE 250: Mod: HCNC | Performed by: STUDENT IN AN ORGANIZED HEALTH CARE EDUCATION/TRAINING PROGRAM

## 2024-11-22 PROCEDURE — 85027 COMPLETE CBC AUTOMATED: CPT | Mod: HCNC

## 2024-11-22 PROCEDURE — 99900035 HC TECH TIME PER 15 MIN (STAT): Mod: HCNC

## 2024-11-22 PROCEDURE — 25000003 PHARM REV CODE 250: Mod: HCNC

## 2024-11-22 PROCEDURE — C1894 INTRO/SHEATH, NON-LASER: HCPCS | Mod: HCNC | Performed by: SURGERY

## 2024-11-22 PROCEDURE — 27201423 OPTIME MED/SURG SUP & DEVICES STERILE SUPPLY: Mod: HCNC | Performed by: SURGERY

## 2024-11-22 PROCEDURE — B41DYZZ FLUOROSCOPY OF AORTA AND BILATERAL LOWER EXTREMITY ARTERIES USING OTHER CONTRAST: ICD-10-PCS | Performed by: SURGERY

## 2024-11-22 PROCEDURE — 82962 GLUCOSE BLOOD TEST: CPT | Mod: HCNC | Performed by: SURGERY

## 2024-11-22 PROCEDURE — C1874 STENT, COATED/COV W/DEL SYS: HCPCS | Mod: HCNC | Performed by: SURGERY

## 2024-11-22 PROCEDURE — C1769 GUIDE WIRE: HCPCS | Mod: HCNC | Performed by: SURGERY

## 2024-11-22 PROCEDURE — 25000003 PHARM REV CODE 250: Mod: HCNC | Performed by: ANESTHESIOLOGY

## 2024-11-22 PROCEDURE — 37000009 HC ANESTHESIA EA ADD 15 MINS: Mod: HCNC | Performed by: SURGERY

## 2024-11-22 PROCEDURE — 0JBM0ZZ EXCISION OF LEFT UPPER LEG SUBCUTANEOUS TISSUE AND FASCIA, OPEN APPROACH: ICD-10-PCS | Performed by: SURGERY

## 2024-11-22 PROCEDURE — 71000033 HC RECOVERY, INTIAL HOUR: Mod: HCNC | Performed by: SURGERY

## 2024-11-22 PROCEDURE — 88304 TISSUE EXAM BY PATHOLOGIST: CPT | Mod: 26,HCNC,, | Performed by: PATHOLOGY

## 2024-11-22 PROCEDURE — 80069 RENAL FUNCTION PANEL: CPT | Mod: HCNC

## 2024-11-22 PROCEDURE — 63600175 PHARM REV CODE 636 W HCPCS: Mod: HCNC | Performed by: STUDENT IN AN ORGANIZED HEALTH CARE EDUCATION/TRAINING PROGRAM

## 2024-11-22 PROCEDURE — 71000016 HC POSTOP RECOV ADDL HR: Mod: HCNC | Performed by: SURGERY

## 2024-11-22 PROCEDURE — 37221 PR REVASCULARIZE ILIAC ARTERY,ANGIOPLASTY/STENT, INITIAL VESSEL: CPT | Mod: HCNC,LT,, | Performed by: SURGERY

## 2024-11-22 PROCEDURE — C1760 CLOSURE DEV, VASC: HCPCS | Mod: HCNC | Performed by: SURGERY

## 2024-11-22 PROCEDURE — 71000039 HC RECOVERY, EACH ADD'L HOUR: Mod: HCNC | Performed by: SURGERY

## 2024-11-22 PROCEDURE — 99232 SBSQ HOSP IP/OBS MODERATE 35: CPT | Mod: HCNC,GC,,

## 2024-11-22 PROCEDURE — 63600175 PHARM REV CODE 636 W HCPCS: Mod: HCNC | Performed by: SURGERY

## 2024-11-22 PROCEDURE — 36000706: Mod: HCNC | Performed by: SURGERY

## 2024-11-22 PROCEDURE — 25500020 PHARM REV CODE 255: Mod: HCNC | Performed by: SURGERY

## 2024-11-22 PROCEDURE — 86922 COMPATIBILITY TEST ANTIGLOB: CPT | Mod: HCNC | Performed by: SURGERY

## 2024-11-22 PROCEDURE — 63600175 PHARM REV CODE 636 W HCPCS: Mod: HCNC

## 2024-11-22 PROCEDURE — 27000221 HC OXYGEN, UP TO 24 HOURS: Mod: HCNC

## 2024-11-22 PROCEDURE — 36000707: Mod: HCNC | Performed by: SURGERY

## 2024-11-22 PROCEDURE — 047D3DZ DILATION OF LEFT COMMON ILIAC ARTERY WITH INTRALUMINAL DEVICE, PERCUTANEOUS APPROACH: ICD-10-PCS | Performed by: SURGERY

## 2024-11-22 PROCEDURE — 36415 COLL VENOUS BLD VENIPUNCTURE: CPT | Mod: HCNC

## 2024-11-22 PROCEDURE — 71000015 HC POSTOP RECOV 1ST HR: Mod: HCNC | Performed by: SURGERY

## 2024-11-22 PROCEDURE — A4216 STERILE WATER/SALINE, 10 ML: HCPCS | Mod: HCNC | Performed by: ANESTHESIOLOGY

## 2024-11-22 PROCEDURE — 88304 TISSUE EXAM BY PATHOLOGIST: CPT | Mod: HCNC | Performed by: PATHOLOGY

## 2024-11-22 PROCEDURE — C1887 CATHETER, GUIDING: HCPCS | Mod: HCNC | Performed by: SURGERY

## 2024-11-22 PROCEDURE — 37000008 HC ANESTHESIA 1ST 15 MINUTES: Mod: HCNC | Performed by: SURGERY

## 2024-11-22 PROCEDURE — 15271 SKIN SUB GRAFT TRNK/ARM/LEG: CPT | Mod: 51,HCNC,, | Performed by: SURGERY

## 2024-11-22 PROCEDURE — 94761 N-INVAS EAR/PLS OXIMETRY MLT: CPT | Mod: HCNC

## 2024-11-22 PROCEDURE — 86901 BLOOD TYPING SEROLOGIC RH(D): CPT | Mod: HCNC | Performed by: SURGERY

## 2024-11-22 PROCEDURE — 83735 ASSAY OF MAGNESIUM: CPT | Mod: HCNC

## 2024-11-22 PROCEDURE — 21400001 HC TELEMETRY ROOM: Mod: HCNC

## 2024-11-22 DEVICE — BX2 HEP REDUCED PROFILE BX2 8MMX39MM 7FR 135CM CATH
Type: IMPLANTABLE DEVICE | Site: LEG | Status: FUNCTIONAL
Brand: GORE VIABAHN VBX BALLOON EXPANDABLE ENDO

## 2024-11-22 DEVICE — MATRIX PURAPLYAM ANTIMIC 6X9CM: Type: IMPLANTABLE DEVICE | Site: LEG | Status: FUNCTIONAL

## 2024-11-22 RX ORDER — ROCURONIUM BROMIDE 10 MG/ML
INJECTION, SOLUTION INTRAVENOUS
Status: DISCONTINUED | OUTPATIENT
Start: 2024-11-22 | End: 2024-11-22

## 2024-11-22 RX ORDER — HALOPERIDOL 5 MG/ML
0.5 INJECTION INTRAMUSCULAR EVERY 10 MIN PRN
Status: DISCONTINUED | OUTPATIENT
Start: 2024-11-22 | End: 2024-11-22 | Stop reason: HOSPADM

## 2024-11-22 RX ORDER — HEPARIN SODIUM 1000 [USP'U]/ML
INJECTION, SOLUTION INTRAVENOUS; SUBCUTANEOUS
Status: DISCONTINUED | OUTPATIENT
Start: 2024-11-22 | End: 2024-11-22

## 2024-11-22 RX ORDER — HEPARIN SOD,PORCINE/0.9 % NACL 1000/500ML
INTRAVENOUS SOLUTION INTRAVENOUS
Status: DISCONTINUED | OUTPATIENT
Start: 2024-11-22 | End: 2024-11-22 | Stop reason: HOSPADM

## 2024-11-22 RX ORDER — PROTAMINE SULFATE 10 MG/ML
INJECTION, SOLUTION INTRAVENOUS
Status: DISCONTINUED | OUTPATIENT
Start: 2024-11-22 | End: 2024-11-22

## 2024-11-22 RX ORDER — MIDAZOLAM HYDROCHLORIDE 1 MG/ML
INJECTION INTRAMUSCULAR; INTRAVENOUS
Status: DISCONTINUED | OUTPATIENT
Start: 2024-11-22 | End: 2024-11-22

## 2024-11-22 RX ORDER — ETOMIDATE 2 MG/ML
INJECTION INTRAVENOUS
Status: DISCONTINUED | OUTPATIENT
Start: 2024-11-22 | End: 2024-11-22

## 2024-11-22 RX ORDER — IODIXANOL 320 MG/ML
INJECTION, SOLUTION INTRAVASCULAR
Status: DISCONTINUED | OUTPATIENT
Start: 2024-11-22 | End: 2024-11-22 | Stop reason: HOSPADM

## 2024-11-22 RX ORDER — FENTANYL CITRATE 50 UG/ML
INJECTION, SOLUTION INTRAMUSCULAR; INTRAVENOUS
Status: DISCONTINUED | OUTPATIENT
Start: 2024-11-22 | End: 2024-11-22

## 2024-11-22 RX ORDER — HYDROMORPHONE HYDROCHLORIDE 1 MG/ML
0.2 INJECTION, SOLUTION INTRAMUSCULAR; INTRAVENOUS; SUBCUTANEOUS EVERY 5 MIN PRN
Status: DISCONTINUED | OUTPATIENT
Start: 2024-11-22 | End: 2024-11-22 | Stop reason: HOSPADM

## 2024-11-22 RX ORDER — ONDANSETRON HYDROCHLORIDE 2 MG/ML
INJECTION, SOLUTION INTRAVENOUS
Status: DISCONTINUED | OUTPATIENT
Start: 2024-11-22 | End: 2024-11-22

## 2024-11-22 RX ORDER — DEXMEDETOMIDINE HYDROCHLORIDE 100 UG/ML
INJECTION, SOLUTION INTRAVENOUS
Status: DISCONTINUED | OUTPATIENT
Start: 2024-11-22 | End: 2024-11-22

## 2024-11-22 RX ORDER — GLUCAGON 1 MG
1 KIT INJECTION
Status: DISCONTINUED | OUTPATIENT
Start: 2024-11-22 | End: 2024-11-22 | Stop reason: HOSPADM

## 2024-11-22 RX ORDER — SODIUM CHLORIDE 0.9 % (FLUSH) 0.9 %
10 SYRINGE (ML) INJECTION
Status: DISCONTINUED | OUTPATIENT
Start: 2024-11-22 | End: 2024-11-22 | Stop reason: HOSPADM

## 2024-11-22 RX ORDER — FENTANYL CITRATE 50 UG/ML
25 INJECTION, SOLUTION INTRAMUSCULAR; INTRAVENOUS EVERY 5 MIN PRN
Status: DISCONTINUED | OUTPATIENT
Start: 2024-11-22 | End: 2024-11-22 | Stop reason: HOSPADM

## 2024-11-22 RX ORDER — NAPROXEN SODIUM 220 MG/1
81 TABLET, FILM COATED ORAL DAILY
Status: DISCONTINUED | OUTPATIENT
Start: 2024-11-22 | End: 2024-11-25

## 2024-11-22 RX ORDER — SODIUM CHLORIDE 0.9 % (FLUSH) 0.9 %
3 SYRINGE (ML) INJECTION
Status: DISCONTINUED | OUTPATIENT
Start: 2024-11-22 | End: 2024-11-22 | Stop reason: HOSPADM

## 2024-11-22 RX ADMIN — HEPARIN SODIUM 7000 UNITS: 1000 INJECTION, SOLUTION INTRAVENOUS; SUBCUTANEOUS at 03:11

## 2024-11-22 RX ADMIN — ROCURONIUM BROMIDE 20 MG: 10 INJECTION, SOLUTION INTRAVENOUS at 03:11

## 2024-11-22 RX ADMIN — DEXMEDETOMIDINE 4 MCG: 100 INJECTION, SOLUTION, CONCENTRATE INTRAVENOUS at 04:11

## 2024-11-22 RX ADMIN — PROTAMINE SULFATE 5 MG: 10 INJECTION, SOLUTION INTRAVENOUS at 03:11

## 2024-11-22 RX ADMIN — SODIUM CHLORIDE: 0.9 INJECTION, SOLUTION INTRAVENOUS at 02:11

## 2024-11-22 RX ADMIN — ONDANSETRON 4 MG: 2 INJECTION INTRAMUSCULAR; INTRAVENOUS at 03:11

## 2024-11-22 RX ADMIN — Medication 3 ML: at 12:11

## 2024-11-22 RX ADMIN — OXYCODONE HYDROCHLORIDE AND ACETAMINOPHEN 1 TABLET: 5; 325 TABLET ORAL at 10:11

## 2024-11-22 RX ADMIN — PROTAMINE SULFATE 5 MG: 10 INJECTION, SOLUTION INTRAVENOUS at 04:11

## 2024-11-22 RX ADMIN — VANCOMYCIN HYDROCHLORIDE 1000 MG: 1 INJECTION, POWDER, LYOPHILIZED, FOR SOLUTION INTRAVENOUS at 03:11

## 2024-11-22 RX ADMIN — PROTAMINE SULFATE 10 MG: 10 INJECTION, SOLUTION INTRAVENOUS at 04:11

## 2024-11-22 RX ADMIN — SUGAMMADEX 200 MG: 100 INJECTION, SOLUTION INTRAVENOUS at 04:11

## 2024-11-22 RX ADMIN — MIDAZOLAM HYDROCHLORIDE 2 MG: 1 INJECTION, SOLUTION INTRAMUSCULAR; INTRAVENOUS at 02:11

## 2024-11-22 RX ADMIN — OXYCODONE HYDROCHLORIDE AND ACETAMINOPHEN 1 TABLET: 5; 325 TABLET ORAL at 01:11

## 2024-11-22 RX ADMIN — MUPIROCIN: 20 OINTMENT TOPICAL at 09:11

## 2024-11-22 RX ADMIN — MUPIROCIN: 20 OINTMENT TOPICAL at 10:11

## 2024-11-22 RX ADMIN — DEXMEDETOMIDINE 8 MCG: 100 INJECTION, SOLUTION, CONCENTRATE INTRAVENOUS at 04:11

## 2024-11-22 RX ADMIN — HALOPERIDOL LACTATE 0.5 MG: 5 INJECTION, SOLUTION INTRAMUSCULAR at 05:11

## 2024-11-22 RX ADMIN — FENTANYL CITRATE 50 MCG: 50 INJECTION, SOLUTION INTRAMUSCULAR; INTRAVENOUS at 04:11

## 2024-11-22 RX ADMIN — HEPARIN SODIUM 5000 UNITS: 5000 INJECTION INTRAVENOUS; SUBCUTANEOUS at 05:11

## 2024-11-22 RX ADMIN — ASPIRIN 81 MG CHEWABLE TABLET 81 MG: 81 TABLET CHEWABLE at 07:11

## 2024-11-22 RX ADMIN — CEFEPIME 1 G: 1 INJECTION, POWDER, FOR SOLUTION INTRAMUSCULAR; INTRAVENOUS at 01:11

## 2024-11-22 RX ADMIN — FENTANYL CITRATE 50 MCG: 50 INJECTION, SOLUTION INTRAMUSCULAR; INTRAVENOUS at 02:11

## 2024-11-22 RX ADMIN — ETOMIDATE 10 MG: 2 INJECTION, SOLUTION INTRAVENOUS at 02:11

## 2024-11-22 RX ADMIN — DEXMEDETOMIDINE 8 MCG: 100 INJECTION, SOLUTION, CONCENTRATE INTRAVENOUS at 03:11

## 2024-11-22 RX ADMIN — HEPARIN SODIUM 5000 UNITS: 5000 INJECTION INTRAVENOUS; SUBCUTANEOUS at 10:11

## 2024-11-22 RX ADMIN — PREGABALIN 75 MG: 75 CAPSULE ORAL at 07:11

## 2024-11-22 RX ADMIN — ROCURONIUM BROMIDE 40 MG: 10 INJECTION, SOLUTION INTRAVENOUS at 02:11

## 2024-11-22 NOTE — SUBJECTIVE & OBJECTIVE
Medications Prior to Admission   Medication Sig Dispense Refill Last Dose/Taking    allopurinoL (ZYLOPRIM) 100 MG tablet Take 0.5 tablets (50 mg total) by mouth every other day. 8 tablet 11 11/18/2024 Morning    apixaban (ELIQUIS) 5 mg Tab Take 1 tablet (5 mg total) by mouth 2 (two) times daily. 60 tablet 11 11/18/2024 Bedtime    atorvastatin (LIPITOR) 80 MG tablet Take 1 tablet (80 mg total) by mouth once daily. 30 tablet 11 11/18/2024 Morning    calcitRIOL (ROCALTROL) 0.5 MCG Cap Take 1 capsule (0.5 mcg total) by mouth once daily. 30 capsule 11 11/18/2024 Morning    carvediloL (COREG) 6.25 MG tablet Take 0.5 tablets (3.125 mg total) by mouth 2 (two) times daily.   11/18/2024 Bedtime    cinacalcet (SENSIPAR) 30 MG Tab Take 1 tablet (30 mg total) by mouth every Mon, Wed, Fri.   11/18/2024 Evening    clopidogreL (PLAVIX) 75 mg tablet Take 1 tablet (75 mg total) by mouth once daily. 30 tablet 11 11/18/2024 Morning    epoetin nina (PROCRIT) 4,000 unit/mL injection Inject 1 mL (4,000 Units total) into the skin every Mon, Wed, Fri.   11/19/2024 Noon    hydrALAZINE (APRESOLINE) 10 MG tablet Take 1 tablet (10 mg total) by mouth every 12 (twelve) hours.   11/18/2024 Bedtime    insulin aspart U-100 (NOVOLOG) 100 unit/mL (3 mL) InPn pen Inject 0-10 Units into the skin before meals and at bedtime as needed (Hyperglycemia). **MODERATE CORRECTION DOSE**  Blood Glucose  mg/dL                  Pre-meal                2200  151-200                2 units                    1 unit  201-250                4 units                    2 units  251-300                6 units                    3 units  301-350                8 units                    4 units  >350                     10 units                  5 units  Administer subcutaneously if needed at times designated by monitoring  schedule.   11/19/2024    isosorbide dinitrate (ISORDIL) 10 MG tablet Take 1 tablet (10 mg total) by mouth 2 (two) times daily.   11/18/2024 Bedtime     "oxyCODONE-acetaminophen (PERCOCET) 5-325 mg per tablet Take 1 tablet by mouth every 8 (eight) hours as needed for Pain. 10 tablet 0 2024    pregabalin (LYRICA) 75 MG capsule Take 1 capsule (75 mg total) by mouth Daily. 7 capsule 0 2024    sertraline (ZOLOFT) 100 MG tablet Take 1 tablet (100 mg total) by mouth once daily. 30 tablet 11 2024    acetaminophen (TYLENOL) 325 MG tablet Take 2 tablets (650 mg total) by mouth every 8 (eight) hours as needed for Pain.   2024    [] amoxicillin-clavulanate 500-125mg (AUGMENTIN) 500-125 mg Tab Take 1 tablet (500 mg total) by mouth 2 (two) times daily. for 5 days       blood sugar diagnostic Strp 1 each by Misc.(Non-Drug; Combo Route) route 3 (three) times daily. 200 each 0     blood sugar diagnostic Strp Use to check blood glucose 2 (two) times a day. 100 strip 0     blood-glucose meter Misc TEST TWICE DAILY 1 each 0     blood-glucose sensor (DEXCOM G7 SENSOR) Radha 1 each by Misc.(Non-Drug; Combo Route) route once daily. 5 each 0     [] doxycycline (VIBRA-TABS) 100 MG tablet Take 1 tablet (100 mg total) by mouth every 12 (twelve) hours. for 5 days       famotidine (PEPCID) 20 MG tablet Take 1 tablet (20 mg total) by mouth once daily.   Unknown    insulin glargine U-100, Lantus, 100 unit/mL (3 mL) SubQ InPn pen Inject 5 Units into the skin every evening.   Unknown    Lactobacillus rhamnosus GG (CULTURELLE) 10 billion cell capsule Take 1 capsule by mouth once daily.   Unknown    lancets 33 gauge Misc TEST 3 TIMES DAILY 100 each 3     pen needle, diabetic 32 gauge x 5/32" Ndle 1 Units by Misc.(Non-Drug; Combo Route) route before meals as needed (SSI). 100 each 3     pen needle, diabetic 33 gauge x 5/32" Ndle 1 each by Misc.(Non-Drug; Combo Route) route 3 (three) times daily. 100 each 0     sodium bicarbonate 650 MG tablet Take 1 tablet (650 mg total) by mouth 3 (three) times daily. 90 tablet 11 Unknown    vitamin renal formula, " B-complex-vitamin c-folic acid, (NEPHROCAP) 1 mg Cap Take 1 capsule by mouth once daily.   Unknown       Review of patient's allergies indicates:   Allergen Reactions    Ciprofloxacin Itching    Pcn [penicillins]      Rash; tolerated ceftriaxone on 1/13/20  Tolerating Augmentin November 2024       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    Osteomyelitis     Osteomyelitis of left foot 04/30/2021    Ulcer of left foot     Vaginal delivery     x1     Past Surgical History:   Procedure Laterality Date    ABOVE-KNEE AMPUTATION Left 5/18/2021    Procedure: AMPUTATION, ABOVE KNEE;  Surgeon: Teddy Huber MD;  Location: [facility] OR [address] [place] [address];  Service: Vascular;  Laterality: Left;    ABOVE-KNEE AMPUTATION Right 3/18/2022    Procedure: AMPUTATION, ABOVE KNEE;  Surgeon: DAYNE Florez II, MD;  Location: [facility] OR [address] [place] [address];  Service: Vascular;  Laterality: Right;    Angiogram - Right Extremity Right 7/9/15    angiogram-left leg  10/6/15    ANGIOGRAPHY OF LOWER EXTREMITY Left 4/29/2021    Procedure: ANGIOGRAM, LOWER EXTREMITY;  Surgeon: Teddy Huber MD;  Location: [facility] OR [address] [place] [address];  Service: Vascular;  Laterality: Left;    BELOW KNEE AMPUTATION OF LOWER EXTREMITY Right 12/28/2021    Procedure: AMPUTATION, BELOW KNEE;  Surgeon: Kaitlyn Rojas MD;  Location: [facility] OR;  Service: General;  Laterality: Right;    CATHETERIZATION OF BOTH LEFT AND RIGHT HEART N/A 12/18/2019    Procedure: CATHETERIZATION, HEART, BOTH LEFT AND RIGHT;  Surgeon: Que Fernando III, MD;  [facility];  Service: Cardiology;  Laterality: N/A;    CORONARY ANGIOGRAPHY N/A 12/18/2019    Procedure: ANGIOGRAM, CORONARY ARTERY;  Surgeon: Que Fernando III, MD;   Location: [facility] CATH LAB;  Service: Cardiology;  Laterality: N/A;    CORONARY ANGIOGRAPHY INCLUDING BYPASS GRAFTS WITH CATHETERIZATION OF LEFT HEART N/A 7/28/2020    Procedure: ANGIOGRAM, CORONARY, INCLUDING BYPASS GRAFT, WITH LEFT HEART CATHETERIZATION, 9 am;  Surgeon: Rachel Easley MD;  Location: [facility] CATH LAB;  Service: Cardiology;  Laterality: N/A;    CORONARY ARTERY BYPASS GRAFT (CABG) N/A 1/14/2020    Procedure: CORONARY ARTERY BYPASS GRAFT (CABG) x 1     Off Pump;  Surgeon: Huang Altamirano MD;  Location: [address];  Service: Cardiovascular;  Laterality: N/A;    CREATION OF FEMORAL-TIBIAL ARTERY BYPASS Left 4/29/2021    Procedure: CREATION, BYPASS, ARTERIAL, FEMORAL TO ANTERIOR TIBIAL;  Surgeon: Teddy Huber MD;  Location: [facility] OR [address] [place] [address];  Service: Vascular;  Laterality: Left;    CREATION OF FEMOROPOPLITEAL ARTERIAL BYPASS USING GRAFT Left 8/18/2020    Procedure: CREATION, BYPASS, ARTERIAL, FEMORAL TO POPLITEAL, USING GRAFT, LEFT LOWER EXTREMITY;  Surgeon: Teddy Huber MD;  Location: [facility];  Service: Vascular;  Laterality: Left;  REQUEST 7:15 A.M. START----COVID NEGATIVE ON 8/17 1ST CASE STARTKENYA DUEÑAS ON 8/7/2020 @ 942AM-LO  RN PREOP 8/12/2020   T/S-----CLEARED BY CARDS-------PENDING INSURANCE    DEBRIDEMENT OF FOOT Left 9/8/2020    Procedure: DEBRIDEMENT, FOOT;  Surgeon: Rosio Mayes DPM;  Location: [facility] OR;  Service: Podiatry;  Laterality: Left;  request neoxx .   RN Pre Op 9-4-2020, Covid negative on 9/5/20. C A    DEBRIDEMENT OF FOOT  3/4/2021    Procedure: DEBRIDEMENT, FOOT;  Surgeon: Teddy Huber MD;  Location: [facility] OR;  Service: Vascular;;    DEBRIDEMENT OF FOOT Left 3/9/2021    Procedure: DEBRIDEMENT, FOOT, bone biopsy;  Surgeon: Rosio Mayes DPM;  Location: [facility] OR;  Service: Podiatry;  Laterality: Left;  Request neoxx---COVID IN AM  REQUESTING NOON START  RN Phone Pre op.On Blood thinners Plavix and Eliquis.  Covid am of surgery. C A    DEBRIDEMENT  OF FOOT Left 5/4/2021    Procedure: DEBRIDEMENT, FOOT;  Surgeon: Farooq Morley DPM;  Location: [facility] 2ND FLR;  Service: Podiatry;  Laterality: Left;    ESOPHAGOGASTRODUODENOSCOPY N/A 11/7/2024    Procedure: EGD (ESOPHAGOGASTRODUODENOSCOPY);  Surgeon: Yony Cancino MD;  Location: [facility] ENDO;  Service: Endoscopy;  Laterality: N/A;    INSERTION OF TUNNELED CENTRAL VENOUS HEMODIALYSIS CATHETER N/A 1/27/2020    Procedure: Insertion, Catheter, Central Venous, Hemodialysis;  Surgeon: ESTEBAN oGmez III, MD;  Location: [facility] CATH LAB;  Service: Peripheral Vascular;  Laterality: N/A;    INSERTION OF TUNNELED CENTRAL VENOUS HEMODIALYSIS CATHETER  5/10/2023    Procedure: Insertion, Catheter, Central Venous, Hemodialysis;  Surgeon: Romulo Queen MD;  Location: [facility] CATH LAB;  Service: Cardiology;;    MAGNETIC RESONANCE IMAGING Left 11/19/2024    Procedure: MRI (Magnetic Resonance Imagine);  Surgeon: Sophia Hernandez;  Location: [facility];  Service: Anesthesiology;  Laterality: Left;    PERCUTANEOUS TRANSLUMINAL ANGIOPLASTY N/A 3/4/2021    Procedure: PTA (ANGIOPLASTY, PERCUTANEOUS, TRANSLUMINAL);  Surgeon: Teddy Huber MD;  Location: [facility] OR;  Service: Vascular;  Laterality: N/A;    REMOVAL OF ARTERIOVENOUS GRAFT Left 5/27/2021    Procedure: REMOVAL, GRAFT, LEFT LOWER EXTREMITY, WOUND EXPLORATION;  Surgeon: Teddy Huber MD;  Location: [address] FLR;  Service: Vascular;  Laterality: Left;    REMOVAL OF NAIL OF DIGIT Left 3/9/2021    Procedure: REMOVAL, NAIL, DIGIT;  Surgeon: Rosio Mayes DPM;  Location: [facility] OR;  Service: Podiatry;  Laterality: Left;    RIGHT HEART CATHETERIZATION Right 5/10/2023    Procedure: INSERTION, CATHETER, RIGHT HEART;  Surgeon: Romulo Queen MD;  Location: [facility] CATH LAB;  Service: Cardiology;  Laterality: Right;    THROMBECTOMY Left 3/4/2021    Procedure: THROMBECTOMY, LEFT LOWER EXTREMITY BYPASS GRAFT, ANGIOGRAM, POSSIBLE INTERVENTION, POSSIBLE LEFT LOWER  EXTREMITY BYPASS;  Surgeon: Teddy Huber MD;  Location: [facility] OR;  Service: Vascular;  Laterality: Left;    THROMBECTOMY Left 4/29/2021    Procedure: GRAFT THROMBECTOMY, LEFT LOWER EXTREMITY;  Surgeon: Teddy Huber MD;  Location: [facility] OR 2ND FLR;  Service: Vascular;  Laterality: Left;  14.5 min  1179.85 mGy  341.01 Gycm2  240 ml dye    THROMBECTOMY  10/22/2021    Procedure: THROMBECTOMY;  Surgeon: Saad Arenas MD;  Location: [facility] CATH LAB/EP;  Service: Cardiology;;     Family History       Problem Relation (Age of Onset)    Diabetes Mother, Father, Paternal Grandmother    Heart disease Maternal Grandmother    No Known Problems Maternal Grandfather, Paternal Grandfather          Tobacco Use    Smoking status: Former    Smokeless tobacco: Never   Substance and Sexual Activity    Alcohol use: No    Drug use: Yes     Types: Marijuana     Comment: occassional    Sexual activity: Yes     Partners: Male     Review of Systems   Constitutional:  Negative for activity change, appetite change and fever.   Skin:  Positive for wound.     Objective:     Vital Signs (Most Recent):  Temp: 98.3 °F (36.8 °C) (11/22/24 0537)  Pulse: 76 (11/22/24 0537)  Resp: 18 (11/22/24 0537)  BP: (!) 166/64 (11/22/24 0537)  SpO2: 97 % (11/22/24 0537) Vital Signs (24h Range):  Temp:  [98.3 °F (36.8 °C)-99.2 °F (37.3 °C)] 98.3 °F (36.8 °C)  Pulse:  [69-86] 76  Resp:  [18] 18  SpO2:  [97 %-98 %] 97 %  BP: (100-166)/(49-78) 166/64     Weight: 71.2 kg (156 lb 15.5 oz)  Body mass index is 47.9 kg/m².      Physical Exam  HENT:      Head: Normocephalic.   Eyes:      Pupils: Pupils are equal, round, and reactive to light.   Cardiovascular:      Comments: Nonpalpable femoral pulses bilaterally.   Skin:     Comments: L AKA wound present with necrotic eschar and discharge.    Neurological:      Mental Status: She is alert.          Significant Labs:  All pertinent labs from the last 24 hours have been reviewed.    Significant Diagnostics:  I  have reviewed all pertinent imaging results/findings within the past 24 hours.

## 2024-11-22 NOTE — CONSULTS
Wound care consult received. Patient is off the unit for a procedure so unable to assess at this time. Chart and media reviewed and the stump wound is being managed by vascular at this time. The other shallow wounds to her buttocks, perineum and skin folds all appear to be moisture related. Recommend to utilize thick barrier cream to protect and provide moist wound healing until wound care is able to assess patient. Nursing to continue care.

## 2024-11-22 NOTE — ASSESSMENT & PLAN NOTE
"60 y/o female PMHx: bilateral BKA, ESRD on HD, DM, HTN, HLD presents with purulence from L stump wound. Recently admitted and received IV abx during that admission and on discharge was transitioned to oral augmentin and doxy and she went to SNF.  She was sent back to the ED on 11/15 for continued purulence and LLE pain.  She has been admitted to hospital medicine for further management.    ID consulted for "antibiotic management"    Blood cultures from 11/15 NGTD    Recommendations / Plan  Continue empiric vanc and Cefepime 1 g q24 hrs  Recommend wound care  OR today with vascular surgery for LLE angio and debridement, will f/u    -- Discussed with ID staff Dr. Vidal and primary team  -- ID will continue to follow for further recs    "

## 2024-11-22 NOTE — SUBJECTIVE & OBJECTIVE
Interval History: NAEON  Afebrile  No leukocytosis    Review of Systems   Constitutional:  Negative for appetite change, chills, fatigue and fever.   Respiratory:  Negative for chest tightness, shortness of breath and wheezing.    Cardiovascular:  Negative for chest pain and palpitations.   Gastrointestinal:  Negative for abdominal distention, abdominal pain, constipation, diarrhea, nausea and vomiting.   Genitourinary:  Negative for decreased urine volume, difficulty urinating, dysuria, flank pain, frequency and urgency.   Musculoskeletal:  Negative for back pain.   Skin:  Positive for wound. Negative for rash.     Objective:     Vital Signs (Most Recent):  Temp: 98.5 °F (36.9 °C) (11/22/24 1212)  Pulse: 82 (11/22/24 1212)  Resp: 18 (11/22/24 1212)  BP: (!) 157/78 (11/22/24 1212)  SpO2: (!) 94 % (11/22/24 1212) Vital Signs (24h Range):  Temp:  [97.9 °F (36.6 °C)-99.2 °F (37.3 °C)] 98.5 °F (36.9 °C)  Pulse:  [76-86] 82  Resp:  [18] 18  SpO2:  [94 %-99 %] 94 %  BP: (124-166)/(58-78) 157/78     Weight: 71.2 kg (156 lb 15.5 oz)  Body mass index is 47.9 kg/m².    Estimated Creatinine Clearance: 17.8 mL/min (A) (based on SCr of 2.6 mg/dL (H)).     Physical Exam  Vitals and nursing note reviewed.   Constitutional:       General: She is not in acute distress.     Appearance: Normal appearance. She is not ill-appearing.   HENT:      Head: Normocephalic and atraumatic.      Nose: Nose normal.   Cardiovascular:      Rate and Rhythm: Normal rate.   Pulmonary:      Effort: Pulmonary effort is normal. No respiratory distress.   Musculoskeletal:      Right Lower Extremity: Right leg is amputated above knee.      Left Lower Extremity: Left leg is amputated above knee.   Skin:     Findings: No lesion or rash.   Neurological:      Mental Status: She is alert and oriented to person, place, and time.   Psychiatric:         Mood and Affect: Mood normal.          Significant Labs: Blood Culture:   Recent Labs   Lab 06/12/24  0949  06/12/24  0954 10/29/24  1045 11/15/24  1543   [facility]. No growth after 5 days. No growth after 5 days.  No growth after 5 days. No growth after 5 days.  No growth after 5 days.       Significant Imaging: I have reviewed all pertinent imaging results/findings within the past 24 hours.

## 2024-11-22 NOTE — OP NOTE
OPERATIVE REPORT    Patient: Suyapa Connelly  Surgery Date: 11/22/2024  Surgeons and Role:     * Yony Lindsey MD - Primary     * Sam Dodd MD - Fellow      Assisting Surgeon: None     Pre-op Diagnosis:  Cellulitis of left thigh [L03.116]     Post-op Diagnosis:  Post-Op Diagnosis Codes:     * Cellulitis of left thigh [L03.116]     Procedure(s) (LRB):  Ultrasound guided access L CFA   Aortogram, bilateral lower extremity angiogram   L common iliac stent 8x39mm VBX   Perclose closure L CFA   Debridement of L AKA stump   Application of Puraply 6x9cm x2      Anesthesia: Choice     Implants:  Implant Name Type Inv. Item Serial No.  Lot No. LRB No. Used Action   STENT VBX 7FR [id number] 135CM - [id number]   STENT VBX 7FR [id number] 135CM [id number] [employer]   Left 1 Implanted   [employer] PURAPLYAM ANTIMIC 6X9CM - [id number]   MATRIX PURAPLYAM ANTIMIC 6X9CM     [id number] Left 2 Implanted         Operative Findings: Successful revascularization with L ABIOLA 8x30mm VBX   Debridement of L AKA to healthy tissue measuring [id number]   Application of Puraply to wound bed DO NOT REMOVE BEFORE 7 DAYS (11/29)        Estimated Blood Loss: 200cc    Indications for Procedure:  Suyapa Connelly is a 59 y.o. female who presented with a nonhealing L AKA wound in setting of L PVD. The patient was offered  LLE Angiogram and wound debridement. All risks, benefits, and alternatives were discussed and the patient understood and wished to proceed and gave written consent.     Operative Details:   The patient was brought to the operating room and placed in the operating room table in supine position.  She underwent general anesthesia with endotracheal tube intubation.  The bilateral groins and left lower extremity was prepped and draped in the usual sterile fashion.  Perioperative antibiotics were administered.  A time-out was performed.    Under ultrasound guidance, the left common femoral previous bypass was accessed  with a micropuncture needle, followed by Mandril wire, followed by micropuncture sheath.  A stiff angled glidewire was then placed into the iliac system.  Over the wire, a short 5 Czech sheath was placed.  The patient was then systemically heparinized with IV heparin.  The stiff angled glidewire through a Seeker catheter was used to cross the left common iliac artery lesion.  This was confirmed with an aortogram.  Following this, an Omni catheter was then placed.  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  There was reconstitution of the iliac artery prior to the takeoff of the left hypogastric.  Following this, a 7 x 25 cm Brite tip sheath was then brought up and placed into the distal aorta.  An 8 x 39 mm VBX was then deployed to the nominal pressure to 8 mm in the left common iliac artery.  Follow-up angiogram demonstrated resolution of the stenosis.  This concluded the intervention.  The heparin was reversed with protamine.  A Perclose closure device the ice was applied in the left common femoral artery bypass without issue.  The skin nick was closed with a 4-0 Monocryl.  Dry sterile dressings were applied.    Following this, we turned our attention to the debridement of the left lower extremity wound.  Using sharp debridement, the previous eschar in unhealthy tissue was unroofed and sent off as specimen, to evaluate for calciphylaxis.  The underlying tissue did appear healthy.  There was good portion of bleeding present from the wound site.  Hemostasis was then carefully achieved with Vicryl sutures, Prabhjot powder.  Two 6 x 9 cm PuraPly tissue matrix as were applied to the surgical bed.  Adaptic was then placed.  A wound VAC was placed over the Adaptic and secured.  The wound VAC appeared to hold good suction.    Patient was then liberated from  anesthesia and brought to the postanesthesia care recovery unit in stable condition.    Dr. Lindsey was present and scrubbed for all aspects of the case.    All instrument and sponge counts were confirmed correct.

## 2024-11-22 NOTE — PROGRESS NOTES
[facility] Surg  Vascular Surgery  Progress Note    Patient Name: Suyapa Connelly  MRN: [id number]  Admission Date: 11/15/2024  Primary Care Provider: Vanessa Noel MD    Subjective:     Interval History: NAOE, to OR today for LLE angio and debridement    Post-Op Info:  Procedure(s) (LRB):  MRI (Magnetic Resonance Imagine) (Left)   3 Days Post-Op     Medications Prior to Admission   Medication Sig Dispense Refill Last Dose/Taking    allopurinoL (ZYLOPRIM) 100 MG tablet Take 0.5 tablets (50 mg total) by mouth every other day. 8 tablet 11 11/18/2024 Morning    apixaban (ELIQUIS) 5 mg Tab Take 1 tablet (5 mg total) by mouth 2 (two) times daily. 60 tablet 11 11/18/2024 Bedtime    atorvastatin (LIPITOR) 80 MG tablet Take 1 tablet (80 mg total) by mouth once daily. 30 tablet 11 11/18/2024 Morning    calcitRIOL (ROCALTROL) 0.5 MCG Cap Take 1 capsule (0.5 mcg total) by mouth once daily. 30 capsule 11 11/18/2024 Morning    carvediloL (COREG) 6.25 MG tablet Take 0.5 tablets (3.125 mg total) by mouth 2 (two) times daily.   11/18/2024 Bedtime    cinacalcet (SENSIPAR) 30 MG Tab Take 1 tablet (30 mg total) by mouth every Mon, Wed, Fri.   11/18/2024 Evening    clopidogreL (PLAVIX) 75 mg tablet Take 1 tablet (75 mg total) by mouth once daily. 30 tablet 11 11/18/2024 Morning    epoetin nina (PROCRIT) 4,000 unit/mL injection Inject 1 mL (4,000 Units total) into the skin every Mon, Wed, Fri.   11/19/2024 Noon    hydrALAZINE (APRESOLINE) 10 MG tablet Take 1 tablet (10 mg total) by mouth every 12 (twelve) hours.   11/18/2024 Bedtime    insulin aspart U-100 (NOVOLOG) 100 unit/mL (3 mL) InPn pen Inject 0-10 Units into the skin before meals and at bedtime as needed (Hyperglycemia). **MODERATE CORRECTION DOSE**  Blood Glucose  mg/dL                  Pre-meal                2200  151-200                2 units                    1 unit  201-250                4 units                    2 units  251-300                6 units         "            3 units  301-350                8 units                    4 units  >350                     10 units                  5 units  Administer subcutaneously if needed at times designated by monitoring  schedule.   2024    isosorbide dinitrate (ISORDIL) 10 MG tablet Take 1 tablet (10 mg total) by mouth 2 (two) times daily.   2024 Bedtime    oxyCODONE-acetaminophen (PERCOCET) 5-325 mg per tablet Take 1 tablet by mouth every 8 (eight) hours as needed for Pain. 10 tablet 0 2024    pregabalin (LYRICA) 75 MG capsule Take 1 capsule (75 mg total) by mouth Daily. 7 capsule 0 2024    sertraline (ZOLOFT) 100 MG tablet Take 1 tablet (100 mg total) by mouth once daily. 30 tablet 11 2024    acetaminophen (TYLENOL) 325 MG tablet Take 2 tablets (650 mg total) by mouth every 8 (eight) hours as needed for Pain.   2024    [] amoxicillin-clavulanate 500-125mg (AUGMENTIN) 500-125 mg Tab Take 1 tablet (500 mg total) by mouth 2 (two) times daily. for 5 days       blood sugar diagnostic Strp 1 each by Misc.(Non-Drug; Combo Route) route 3 (three) times daily. 200 each 0     blood sugar diagnostic Strp Use to check blood glucose 2 (two) times a day. 100 strip 0     blood-glucose meter Misc TEST TWICE DAILY 1 each 0     blood-glucose sensor (DEXCOM G7 SENSOR) Radha 1 each by Misc.(Non-Drug; Combo Route) route once daily. 5 each 0     [] doxycycline (VIBRA-TABS) 100 MG tablet Take 1 tablet (100 mg total) by mouth every 12 (twelve) hours. for 5 days       famotidine (PEPCID) 20 MG tablet Take 1 tablet (20 mg total) by mouth once daily.   Unknown    insulin glargine U-100, Lantus, 100 unit/mL (3 mL) SubQ InPn pen Inject 5 Units into the skin every evening.   Unknown    Lactobacillus rhamnosus GG (CULTURELLE) 10 billion cell capsule Take 1 capsule by mouth once daily.   Unknown    lancets 33 gauge Misc TEST 3 TIMES DAILY 100 each 3     pen needle, diabetic 32 gauge x 5/32" Ndle 1 Units by " "Misc.(Non-Drug; Combo Route) route before meals as needed (SSI). 100 each 3     pen needle, diabetic 33 gauge x 5/32" Ndle 1 each by Misc.(Non-Drug; Combo Route) route 3 (three) times daily. 100 each 0     sodium bicarbonate 650 MG tablet Take 1 tablet (650 mg total) by mouth 3 (three) times daily. 90 tablet 11 Unknown    vitamin renal formula, B-complex-vitamin c-folic acid, (NEPHROCAP) 1 mg Cap Take 1 capsule by mouth once daily.   Unknown       Review of patient's allergies indicates:   Allergen Reactions    Ciprofloxacin Itching    Pcn [penicillins]      Rash; tolerated ceftriaxone on 1/13/20  Tolerating Augmentin November 2024       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    Osteomyelitis     Osteomyelitis of left foot 04/30/2021    Ulcer of left foot     Vaginal delivery     x1     Past Surgical History:   Procedure Laterality Date    ABOVE-KNEE AMPUTATION Left 5/18/2021    Procedure: AMPUTATION, ABOVE KNEE;  Surgeon: Teddy Huber MD;  Location: [facility] OR [address] [place] [address];  Service: Vascular;  Laterality: Left;    ABOVE-KNEE AMPUTATION Right 3/18/2022    Procedure: AMPUTATION, ABOVE KNEE;  Surgeon: DAYNE Florez II, MD;  Location: [facility] OR [address] [place] [address];  Service: Vascular;  Laterality: Right;    Angiogram - Right Extremity Right 7/9/15    angiogram-left leg  10/6/15    ANGIOGRAPHY OF LOWER EXTREMITY Left 4/29/2021    Procedure: ANGIOGRAM, LOWER EXTREMITY;  Surgeon: Teddy Huber MD;  Location: [facility] OR [address] [place] [address];  Service: Vascular;  Laterality: Left;    BELOW KNEE AMPUTATION OF LOWER EXTREMITY Right 12/28/2021    Procedure: AMPUTATION, BELOW KNEE;  Surgeon: Kaitlyn Rojas MD;  Location: [facility] OR;  Service: General;  Laterality: Right; "    CATHETERIZATION OF BOTH LEFT AND RIGHT HEART N/A 12/18/2019    Procedure: CATHETERIZATION, HEART, BOTH LEFT AND RIGHT;  Surgeon: Que Fernando III, MD;  Location: [facility] CATH LAB;  Service: Cardiology;  Laterality: N/A;    CORONARY ANGIOGRAPHY N/A 12/18/2019    Procedure: ANGIOGRAM, CORONARY ARTERY;  Surgeon: Que Fernando III, MD;  Location: [facility] CATH LAB;  Service: Cardiology;  Laterality: N/A;    CORONARY ANGIOGRAPHY INCLUDING BYPASS GRAFTS WITH CATHETERIZATION OF LEFT HEART N/A 7/28/2020    Procedure: ANGIOGRAM, CORONARY, INCLUDING BYPASS GRAFT, WITH LEFT HEART CATHETERIZATION, 9 am;  Surgeon: Rachel Easley MD;  Location: [facility] CATH LAB;  Service: Cardiology;  Laterality: N/A;    CORONARY ARTERY BYPASS GRAFT (CABG) N/A 1/14/2020    Procedure: CORONARY ARTERY BYPASS GRAFT (CABG) x 1     Off Pump;  Surgeon: Huang Altamirano MD;  Location: [facility] OR [address] [place] [address];  Service: Cardiovascular;  Laterality: N/A;    CREATION OF FEMORAL-TIBIAL ARTERY BYPASS Left 4/29/2021    Procedure: CREATION, BYPASS, ARTERIAL, FEMORAL TO ANTERIOR TIBIAL;  Surgeon: Teddy Huber MD;  Location: [facility] OR [address] [place] [address];  Service: Vascular;  Laterality: Left;    CREATION OF FEMOROPOPLITEAL ARTERIAL BYPASS USING GRAFT Left 8/18/2020    Procedure: CREATION, BYPASS, ARTERIAL, FEMORAL TO POPLITEAL, USING GRAFT, LEFT LOWER EXTREMITY;  Surgeon: Teddy Huber MD;  Location: [facility];  Service: Vascular;  Laterality: Left;  REQUEST 7:15 A.M. START----COVID NEGATIVE ON 8/17  1ST CASE STARTE PER LEANA ON 8/7/2020 @ 942AM-LO  RN PREOP 8/12/2020   T/S-----CLEARED BY CARDS-------PENDING INSURANCE    DEBRIDEMENT OF FOOT Left 9/8/2020    Procedure: DEBRIDEMENT, FOOT;  Surgeon: Rosio Mayes DPM;  Location: [facility] OR;  Service: Podiatry;  Laterality: Left;  request neoxx .   RN Pre Op 9-4-2020, Covid negative on 9/5/20. C A    DEBRIDEMENT OF FOOT  3/4/2021    Procedure: DEBRIDEMENT, FOOT;  Surgeon: Teddy Huber MD;  Location: [facility]  OR;  Service: Vascular;;    DEBRIDEMENT OF FOOT Left 3/9/2021    Procedure: DEBRIDEMENT, FOOT, bone biopsy;  Surgeon: Rosio Mayes DPM;  Location: [facility] OR;  Service: Podiatry;  Laterality: Left;  Request neoxx---COVID IN AM  REQUESTING NOON START  RN Phone Pre op.On Blood thinners Plavix and Eliquis.  Covid am of surgery. C A    DEBRIDEMENT OF FOOT Left 5/4/2021    Procedure: DEBRIDEMENT, FOOT;  Surgeon: Farooq Morley DPM;  Location: [facility] 2ND FLR;  Service: Podiatry;  Laterality: Left;    ESOPHAGOGASTRODUODENOSCOPY N/A 11/7/2024    Procedure: EGD (ESOPHAGOGASTRODUODENOSCOPY);  Surgeon: Yony Cancino MD;  Location: [facility];  Service: Endoscopy;  Laterality: N/A;    INSERTION OF TUNNELED CENTRAL VENOUS HEMODIALYSIS CATHETER N/A 1/27/2020    Procedure: Insertion, Catheter, Central Venous, Hemodialysis;  Surgeon: ESTEBAN Gomez III, MD;  Location: [facility] CATH LAB;  Service: Peripheral Vascular;  Laterality: N/A;    INSERTION OF TUNNELED CENTRAL VENOUS HEMODIALYSIS CATHETER  5/10/2023    Procedure: Insertion, Catheter, Central Venous, Hemodialysis;  Surgeon: Romulo Queen MD;  Location: [facility] CATH LAB;  Service: Cardiology;;    MAGNETIC RESONANCE IMAGING Left 11/19/2024    Procedure: MRI (Magnetic Resonance Imagine);  Surgeon: Sophia Hernandez;  Location: [facility] SOPHIA;  Service: Anesthesiology;  Laterality: Left;    PERCUTANEOUS TRANSLUMINAL ANGIOPLASTY N/A 3/4/2021    Procedure: PTA (ANGIOPLASTY, PERCUTANEOUS, TRANSLUMINAL);  Surgeon: Teddy Huber MD;  Location: [facility];  Service: Vascular;  Laterality: N/A;    REMOVAL OF ARTERIOVENOUS GRAFT Left 5/27/2021    Procedure: REMOVAL, GRAFT, LEFT LOWER EXTREMITY, WOUND EXPLORATION;  Surgeon: Teddy Huber MD;  [address];  Service: Vascular;  Laterality: Left;    REMOVAL OF NAIL OF DIGIT Left 3/9/2021    Procedure: REMOVAL, NAIL, DIGIT;  Surgeon: Rosio Mayes DPM;  Location: [facility] OR;  Service: Podiatry;  Laterality:  Left;    RIGHT HEART CATHETERIZATION Right 5/10/2023    Procedure: INSERTION, CATHETER, RIGHT HEART;  Surgeon: Romulo Queen MD;  Location: [facility] CATH LAB;  Service: Cardiology;  Laterality: Right;    THROMBECTOMY Left 3/4/2021    Procedure: THROMBECTOMY, LEFT LOWER EXTREMITY BYPASS GRAFT, ANGIOGRAM, POSSIBLE INTERVENTION, POSSIBLE LEFT LOWER EXTREMITY BYPASS;  Surgeon: Teddy Huber MD;  Location: [facility] OR;  Service: Vascular;  Laterality: Left;    THROMBECTOMY Left 4/29/2021    Procedure: GRAFT THROMBECTOMY, LEFT LOWER EXTREMITY;  Surgeon: Teddy Huber MD;  Location: [facility] OR 2ND FLR;  Service: Vascular;  Laterality: Left;  14.5 min  1179.85 mGy  341.01 Gycm2  240 ml dye    THROMBECTOMY  10/22/2021    Procedure: THROMBECTOMY;  Surgeon: Saad Arenas MD;  Location: [facility] CATH LAB/EP;  Service: Cardiology;;     Family History       Problem Relation (Age of Onset)    Diabetes Mother, Father, Paternal Grandmother    Heart disease Maternal Grandmother    No Known Problems Maternal Grandfather, Paternal Grandfather          Tobacco Use    Smoking status: Former    Smokeless tobacco: Never   Substance and Sexual Activity    Alcohol use: No    Drug use: Yes     Types: Marijuana     Comment: occassional    Sexual activity: Yes     Partners: Male     Review of Systems   Constitutional:  Negative for activity change, appetite change and fever.   Skin:  Positive for wound.     Objective:     Vital Signs (Most Recent):  Temp: 98.3 °F (36.8 °C) (11/22/24 0537)  Pulse: 76 (11/22/24 0537)  Resp: 18 (11/22/24 0537)  BP: (!) 166/64 (11/22/24 0537)  SpO2: 97 % (11/22/24 0537) Vital Signs (24h Range):  Temp:  [98.3 °F (36.8 °C)-99.2 °F (37.3 °C)] 98.3 °F (36.8 °C)  Pulse:  [69-86] 76  Resp:  [18] 18  SpO2:  [97 %-98 %] 97 %  BP: (100-166)/(49-78) 166/64     Weight: 71.2 kg (156 lb 15.5 oz)  Body mass index is 47.9 kg/m².      Physical Exam  HENT:      Head: Normocephalic.   Eyes:      Pupils: Pupils are equal, round,  and reactive to light.   Cardiovascular:      Comments: Nonpalpable femoral pulses bilaterally.   Skin:     Comments: L AKA wound present with necrotic eschar and discharge.    Neurological:      Mental Status: She is alert.          Significant Labs:  All pertinent labs from the last 24 hours have been reviewed.    Significant Diagnostics:  I have reviewed all pertinent imaging results/findings within the past 24 hours.  Assessment/Plan:     Peripheral vascular disease  58yo female whose PMH includes bilateral AKA, L 2021 s/p thrombosed bypass, R 2022 ESRD, DM, HTN, HLD presents with purulence from L stump wound.  She was discharged from this facility 11.12.24 with oral abx or LLE cellulitis after bring admitted for septic shock. She did receive IV abx during that admission and on dishcharge was transitioned to oral augmentin and doxy and she went to SNF.  She was sent back to the ED for continued purulence and LLE pain.     - to OR today for LLE angio + wound debridement  - consented  - marked  - NPO          Betzy Winston MD  Vascular Surgery  [facility] Surg

## 2024-11-22 NOTE — ANESTHESIA POSTPROCEDURE EVALUATION
Anesthesia Post Evaluation    Patient: Suyapa Connelly    Procedure(s) Performed: Procedure(s) (LRB):  ANGIOGRAM, EXTREMITY, UNILATERAL (Left)  IRRIGATION AND DEBRIDEMENT, LOWER EXTREMITY (Left)  STENT, ILIAC ARTERY (Left)    Final Anesthesia Type: general      Patient location during evaluation: PACU  Patient participation: Yes- Able to Participate  Level of consciousness: awake and alert and oriented  Post-procedure vital signs: reviewed and stable  Pain management: adequate  Airway patency: patent    PONV status at discharge: No PONV  Anesthetic complications: no      Cardiovascular status: blood pressure returned to baseline  Respiratory status: unassisted, room air and spontaneous ventilation  Hydration status: euvolemic  Follow-up not needed.              Vitals Value Taken Time   /57 11/22/24 1716   Temp 36.7 °C (98.1 °F) 11/22/24 1658   Pulse 81 11/22/24 1720   Resp 14 11/22/24 1720   SpO2 97 % 11/22/24 1720   Vitals shown include unfiled device data.      No case tracking events are documented in the log.      Pain/Shannan Score: Pain Rating Prior to Med Admin: 10 (11/22/2024  1:10 AM)  Pain Rating Post Med Admin: 6 (11/21/2024  5:19 PM)  Shannan Score: 7 (11/22/2024  4:58 PM)

## 2024-11-22 NOTE — BRIEF OP NOTE
[facility] - Surgery ([facility])  Brief Operative Note    SUMMARY     Surgery Date: 11/22/2024     Surgeons and Role:     * Yony Lindsey MD - Primary     * Sam Dodd MD - Fellow     Assisting Surgeon: None    Pre-op Diagnosis:  Cellulitis of left thigh [L03.116]    Post-op Diagnosis:  Post-Op Diagnosis Codes:     * Cellulitis of left thigh [L03.116]    Procedure(s) (LRB):  Ultrasound guided access L CFA   Aortogram, bilateral lower extremity angiogram   L common iliac stent 8x39mm VBX   Perclose closure L CFA   Debridement of L AKA stump   Application of Puraply 6x9cm x2     Anesthesia: Choice    Implants:  Implant Name Type Inv. Item Serial No.  Lot No. LRB No. Used Action   STENT VBX 7FR [id number] 135CM - [id number]  STENT VBX 7FR [id number] 135CM [id number] [employer]  Left 1 Implanted   [employer] PURAPLYAM ANTIMIC 6X9CM - [id number]  MATRIX PURAPLYAM ANTIMIC 6X9CM   [id number] Left 2 Implanted       Operative Findings: Successful revascularization with L ABIOLA 8x30mm VBX   Debridement of L AKA to healthy tissue measuring [id number]   Application of Puraply to wound bed DO NOT REMOVE BEFORE 7 DAYS (11/29)      Estimated Blood Loss: 200CC         Specimens:   Specimen (24h ago, onward)       Start     Ordered    11/22/24 1618  Specimen to Pathology, Surgery Other (vascular)  Once        Comments: Pre-op Diagnosis: Cellulitis of left thigh [L03.116]Procedure(s):ANGIOGRAM, EXTREMITY, UNILATERALIRRIGATION AND DEBRIDEMENT, LOWER EXTREMITY Number of specimens: 1Name of specimens: 1. LEFT LOWER EXTREMITY WOUND- PERMANENT     References:    Click here for ordering Quick Tip   Question Answer Comment   Procedure Type: Other vascular   Release to patient Immediate        11/22/24 1618                    [id number]

## 2024-11-22 NOTE — TRANSFER OF CARE
Anesthesia Transfer of Care Note    Patient: Suyapa Connelly    Procedure(s) Performed: Procedure(s) (LRB):  ANGIOGRAM, EXTREMITY, UNILATERAL (Left)  IRRIGATION AND DEBRIDEMENT, LOWER EXTREMITY (Left)    Patient location: PACU    Anesthesia Type: general    Transport from OR: Transported from OR on 6-10 L/min O2 by face mask with adequate spontaneous ventilation    Post pain: adequate analgesia    Post assessment: no apparent anesthetic complications and tolerated procedure well    Post vital signs: stable    Level of consciousness: awake and alert    Nausea/Vomiting: no nausea/vomiting    Complications: none    Transfer of care protocol was followed      Last vitals: Visit Vitals  BP (!) 108/54 (BP Location: Right arm, Patient Position: Lying)   Pulse 84   Temp 36.7 °C (98.1 °F) (Temporal)   Resp 17   Ht 4' (1.219 m)   Wt 71.2 kg (156 lb 15.5 oz)   LMP 09/30/2015 (Exact Date)   SpO2 100%   Breastfeeding No   BMI 47.90 kg/m²

## 2024-11-22 NOTE — PROGRESS NOTES
"[facility] Surg  Infectious Disease  Progress Note    Patient Name: Suyapa Connelly  MRN: [id number]  Admission Date: 11/15/2024  Length of Stay: 6 days  Attending Physician: Dimitris Rojo III*  Primary Care Provider: Vanessa Noel MD    Isolation Status: No active isolations  Assessment/Plan:      ID  * Cellulitis of left thigh  60 y/o female PMHx: bilateral AKA, ESRD on HD, DM, HTN, HLD presents with purulence from L stump wound. Recently admitted and received IV abx during that admission and on discharge was transitioned to oral augmentin and doxy and she went to SNF.  She was sent back to the ED on 11/15 for continued purulence and LLE pain.  She has been admitted to hospital medicine for further management.    ID consulted for "antibiotic management"    Blood cultures from 11/15 NGTD    Recommendations / Plan  Continue empiric vanc and Cefepime 1 g q24 hrs  Recommend wound care  OR today with vascular surgery for LLE angio and debridement, will f/u    -- Discussed with ID staff Dr. Vidal and primary team  -- ID will continue to follow for further recs          Anticipated Disposition: TBD    Thank you for your consult. I will follow-up with patient. Please contact us if you have any additional questions.    ADRIAN Abdi  Infectious Disease  [facility] Surg    Subjective:     Principal Problem:Cellulitis of left thigh    HPI: 60 y/o female PMHx: bilateral AKA, ESRD on HD, DM, HTN, HLD presents with purulence from L stump wound.  She was discharged from this facility 11/12/24 with oral abx for LLE cellulitis after bring admitted for septic shock.  During that admission there were concerns for nec fasc as there was subcutaneous emphysema noted on CT, however, nec fasc was ruled out.  She did receive IV abx during that admission and on dishcharge was transitioned to oral augmentin and doxy and she went to SNF.  She was sent back to the ED for continued purulence and LLE pain. She has " been admitted to hospital medicine for further management.      -- ID will continue to follow for further recs    Interval History: NAEON  Afebrile  No leukocytosis    Review of Systems   Constitutional:  Negative for appetite change, chills, fatigue and fever.   Respiratory:  Negative for chest tightness, shortness of breath and wheezing.    Cardiovascular:  Negative for chest pain and palpitations.   Gastrointestinal:  Negative for abdominal distention, abdominal pain, constipation, diarrhea, nausea and vomiting.   Genitourinary:  Negative for decreased urine volume, difficulty urinating, dysuria, flank pain, frequency and urgency.   Musculoskeletal:  Negative for back pain.   Skin:  Positive for wound. Negative for rash.     Objective:     Vital Signs (Most Recent):  Temp: 98.5 °F (36.9 °C) (11/22/24 1212)  Pulse: 82 (11/22/24 1212)  Resp: 18 (11/22/24 1212)  BP: (!) 157/78 (11/22/24 1212)  SpO2: (!) 94 % (11/22/24 1212) Vital Signs (24h Range):  Temp:  [97.9 °F (36.6 °C)-99.2 °F (37.3 °C)] 98.5 °F (36.9 °C)  Pulse:  [76-86] 82  Resp:  [18] 18  SpO2:  [94 %-99 %] 94 %  BP: (124-166)/(58-78) 157/78     Weight: 71.2 kg (156 lb 15.5 oz)  Body mass index is 47.9 kg/m².    Estimated Creatinine Clearance: 17.8 mL/min (A) (based on SCr of 2.6 mg/dL (H)).     Physical Exam  Vitals and nursing note reviewed.   Constitutional:       General: She is not in acute distress.     Appearance: Normal appearance. She is not ill-appearing.   HENT:      Head: Normocephalic and atraumatic.      Nose: Nose normal.   Cardiovascular:      Rate and Rhythm: Normal rate.   Pulmonary:      Effort: Pulmonary effort is normal. No respiratory distress.   Musculoskeletal:      Right Lower Extremity: Right leg is amputated above knee.      Left Lower Extremity: Left leg is amputated above knee.   Skin:     Findings: No lesion or rash.   Neurological:      Mental Status: She is alert and oriented to person, place, and time.   Psychiatric:          Mood and Affect: Mood normal.          Significant Labs: Blood Culture:   Recent Labs   Lab 06/12/24  0949 06/12/24  0954 10/29/24  1045 11/15/24  1543   LABBLOO No growth after 5 days. No growth after 5 days. No growth after 5 days.  No growth after 5 days. No growth after 5 days.  No growth after 5 days.       Significant Imaging: I have reviewed all pertinent imaging results/findings within the past 24 hours.

## 2024-11-22 NOTE — ANESTHESIA PROCEDURE NOTES
Intubation    Date/Time: 11/22/2024 2:47 PM    Performed by: Haydee Aburto CRNA  Authorized by: Jayme Marino MD    Intubation:     Induction:  Intravenous    Intubated:  Postinduction    Mask Ventilation:  Easy mask (two hand ventilation - avoided placing oral airway due to poor dentition)    Attempts:  1    Attempted By:  CRNA    Method of Intubation:  Video laryngoscopy    Blade:  Elizabeth 3    Laryngeal View Grade: Grade I - full view of cords      Difficult Airway Encountered?: No      Complications:  None    Airway Device:  Oral endotracheal tube    Airway Device Size:  7.5    Style/Cuff Inflation:  Cuffed (inflated to minimal occlusive pressure)    Tube secured:  21    Secured at:  The lips    Placement Verified By:  Capnometry    Complicating Factors:  Obesity and short neck    Findings Post-Intubation:  BS equal bilateral and atraumatic/condition of teeth unchanged  Notes:      Extremely poor dentition with multiple cracked, chipped and missing teeth; oral airway not placed for ventilation; condition unchanged from prior to VL

## 2024-11-22 NOTE — ASSESSMENT & PLAN NOTE
60yo female whose PMH includes bilateral AKA, L 2021 s/p thrombosed bypass, R 2022 ESRD, DM, HTN, HLD presents with purulence from L stump wound.  She was discharged from this facility 11.12.24 with oral abx or LLE cellulitis after bring admitted for septic shock. She did receive IV abx during that admission and on dishcharge was transitioned to oral augmentin and doxy and she went to SNF.  She was sent back to the ED for continued purulence and LLE pain.     - to OR today for LLE angio + wound debridement  - consented  - marked  - NPO

## 2024-11-23 LAB
ALBUMIN SERPL BCP-MCNC: 1.6 G/DL (ref 3.5–5.2)
ANION GAP SERPL CALC-SCNC: 10 MMOL/L (ref 8–16)
ANION GAP SERPL CALC-SCNC: 9 MMOL/L (ref 8–16)
BLD PROD TYP BPU: NORMAL
BLOOD UNIT EXPIRATION DATE: NORMAL
BLOOD UNIT TYPE CODE: 5100
BLOOD UNIT TYPE: NORMAL
BUN SERPL-MCNC: 10 MG/DL (ref 6–20)
BUN SERPL-MCNC: 22 MG/DL (ref 6–20)
CALCIUM SERPL-MCNC: 8.2 MG/DL (ref 8.7–10.5)
CALCIUM SERPL-MCNC: 8.5 MG/DL (ref 8.7–10.5)
CHLORIDE SERPL-SCNC: 97 MMOL/L (ref 95–110)
CHLORIDE SERPL-SCNC: 97 MMOL/L (ref 95–110)
CO2 SERPL-SCNC: 21 MMOL/L (ref 23–29)
CO2 SERPL-SCNC: 25 MMOL/L (ref 23–29)
CODING SYSTEM: NORMAL
CREAT SERPL-MCNC: 1.8 MG/DL (ref 0.5–1.4)
CREAT SERPL-MCNC: 3.3 MG/DL (ref 0.5–1.4)
CROSSMATCH INTERPRETATION: NORMAL
DISPENSE STATUS: NORMAL
ERYTHROCYTE [DISTWIDTH] IN BLOOD BY AUTOMATED COUNT: 19.7 % (ref 11.5–14.5)
EST. GFR  (NO RACE VARIABLE): 15.6 ML/MIN/1.73 M^2
EST. GFR  (NO RACE VARIABLE): 32.3 ML/MIN/1.73 M^2
GLUCOSE SERPL-MCNC: 130 MG/DL (ref 70–110)
GLUCOSE SERPL-MCNC: 71 MG/DL (ref 70–110)
HCT VFR BLD AUTO: 18.9 % (ref 37–48.5)
HGB BLD-MCNC: 6.1 G/DL (ref 12–16)
HGB BLD-MCNC: 7.7 G/DL (ref 12–16)
MAGNESIUM SERPL-MCNC: 1.8 MG/DL (ref 1.6–2.6)
MCH RBC QN AUTO: 31.4 PG (ref 27–31)
MCHC RBC AUTO-ENTMCNC: 32.3 G/DL (ref 32–36)
MCV RBC AUTO: 97 FL (ref 82–98)
PHOSPHATE SERPL-MCNC: 4.1 MG/DL (ref 2.7–4.5)
PLATELET # BLD AUTO: 332 K/UL (ref 150–450)
PMV BLD AUTO: 10.9 FL (ref 9.2–12.9)
POCT GLUCOSE: 133 MG/DL (ref 70–110)
POCT GLUCOSE: 147 MG/DL (ref 70–110)
POCT GLUCOSE: 85 MG/DL (ref 70–110)
POCT GLUCOSE: 86 MG/DL (ref 70–110)
POCT GLUCOSE: 94 MG/DL (ref 70–110)
POTASSIUM SERPL-SCNC: 3.7 MMOL/L (ref 3.5–5.1)
POTASSIUM SERPL-SCNC: 4.9 MMOL/L (ref 3.5–5.1)
RBC # BLD AUTO: 1.94 M/UL (ref 4–5.4)
SODIUM SERPL-SCNC: 128 MMOL/L (ref 136–145)
SODIUM SERPL-SCNC: 131 MMOL/L (ref 136–145)
TRANS ERYTHROCYTES VOL PATIENT: NORMAL ML
VANCOMYCIN SERPL-MCNC: 27.6 UG/ML
WBC # BLD AUTO: 9.2 K/UL (ref 3.9–12.7)

## 2024-11-23 PROCEDURE — 25000003 PHARM REV CODE 250: Mod: HCNC | Performed by: ANESTHESIOLOGY

## 2024-11-23 PROCEDURE — 80048 BASIC METABOLIC PNL TOTAL CA: CPT | Mod: HCNC | Performed by: FAMILY MEDICINE

## 2024-11-23 PROCEDURE — 83735 ASSAY OF MAGNESIUM: CPT | Mod: HCNC

## 2024-11-23 PROCEDURE — 63600175 PHARM REV CODE 636 W HCPCS: Mod: HCNC | Performed by: FAMILY MEDICINE

## 2024-11-23 PROCEDURE — A4216 STERILE WATER/SALINE, 10 ML: HCPCS | Mod: HCNC

## 2024-11-23 PROCEDURE — 63600175 PHARM REV CODE 636 W HCPCS: Mod: HCNC

## 2024-11-23 PROCEDURE — 25000003 PHARM REV CODE 250: Mod: HCNC | Performed by: NURSE PRACTITIONER

## 2024-11-23 PROCEDURE — 80100016 HC MAINTENANCE HEMODIALYSIS: Mod: HCNC

## 2024-11-23 PROCEDURE — 30243N1 TRANSFUSION OF NONAUTOLOGOUS RED BLOOD CELLS INTO CENTRAL VEIN, PERCUTANEOUS APPROACH: ICD-10-PCS | Performed by: FAMILY MEDICINE

## 2024-11-23 PROCEDURE — 36415 COLL VENOUS BLD VENIPUNCTURE: CPT | Mod: HCNC

## 2024-11-23 PROCEDURE — 99232 SBSQ HOSP IP/OBS MODERATE 35: CPT | Mod: HCNC,,, | Performed by: PHYSICIAN ASSISTANT

## 2024-11-23 PROCEDURE — 25000003 PHARM REV CODE 250: Mod: HCNC | Performed by: FAMILY MEDICINE

## 2024-11-23 PROCEDURE — 80069 RENAL FUNCTION PANEL: CPT | Mod: HCNC

## 2024-11-23 PROCEDURE — 86922 COMPATIBILITY TEST ANTIGLOB: CPT | Mod: HCNC | Performed by: FAMILY MEDICINE

## 2024-11-23 PROCEDURE — P9021 RED BLOOD CELLS UNIT: HCPCS | Mod: HCNC | Performed by: SURGERY

## 2024-11-23 PROCEDURE — 85027 COMPLETE CBC AUTOMATED: CPT | Mod: HCNC

## 2024-11-23 PROCEDURE — 36415 COLL VENOUS BLD VENIPUNCTURE: CPT | Mod: HCNC | Performed by: FAMILY MEDICINE

## 2024-11-23 PROCEDURE — 85018 HEMOGLOBIN: CPT | Mod: HCNC | Performed by: FAMILY MEDICINE

## 2024-11-23 PROCEDURE — 63600175 PHARM REV CODE 636 W HCPCS: Mod: JG,HCNC

## 2024-11-23 PROCEDURE — 21400001 HC TELEMETRY ROOM: Mod: HCNC

## 2024-11-23 PROCEDURE — 80202 ASSAY OF VANCOMYCIN: CPT | Mod: HCNC

## 2024-11-23 PROCEDURE — A4216 STERILE WATER/SALINE, 10 ML: HCPCS | Mod: HCNC | Performed by: ANESTHESIOLOGY

## 2024-11-23 PROCEDURE — 90935 HEMODIALYSIS ONE EVALUATION: CPT | Mod: HCNC,,, | Performed by: NURSE PRACTITIONER

## 2024-11-23 PROCEDURE — 25000003 PHARM REV CODE 250: Mod: HCNC

## 2024-11-23 RX ORDER — CEFEPIME HYDROCHLORIDE 1 G/1
1 INJECTION, POWDER, FOR SOLUTION INTRAMUSCULAR; INTRAVENOUS
Status: DISCONTINUED | OUTPATIENT
Start: 2024-11-23 | End: 2024-11-25

## 2024-11-23 RX ORDER — OXYCODONE AND ACETAMINOPHEN 5; 325 MG/1; MG/1
1 TABLET ORAL EVERY 4 HOURS PRN
Status: DISCONTINUED | OUTPATIENT
Start: 2024-11-23 | End: 2024-11-25

## 2024-11-23 RX ORDER — HYDROMORPHONE HYDROCHLORIDE 1 MG/ML
0.5 INJECTION, SOLUTION INTRAMUSCULAR; INTRAVENOUS; SUBCUTANEOUS EVERY 4 HOURS PRN
Status: DISCONTINUED | OUTPATIENT
Start: 2024-11-23 | End: 2024-12-05 | Stop reason: HOSPADM

## 2024-11-23 RX ORDER — HYDROCODONE BITARTRATE AND ACETAMINOPHEN 500; 5 MG/1; MG/1
TABLET ORAL
Status: DISCONTINUED | OUTPATIENT
Start: 2024-11-23 | End: 2024-11-27

## 2024-11-23 RX ADMIN — HYDRALAZINE HYDROCHLORIDE 10 MG: 10 TABLET ORAL at 10:11

## 2024-11-23 RX ADMIN — CEFEPIME 1 G: 1 INJECTION, POWDER, FOR SOLUTION INTRAMUSCULAR; INTRAVENOUS at 12:11

## 2024-11-23 RX ADMIN — HEPARIN SODIUM 5000 UNITS: 5000 INJECTION INTRAVENOUS; SUBCUTANEOUS at 06:11

## 2024-11-23 RX ADMIN — OXYCODONE HYDROCHLORIDE AND ACETAMINOPHEN 1 TABLET: 5; 325 TABLET ORAL at 10:11

## 2024-11-23 RX ADMIN — CARVEDILOL 3.12 MG: 3.12 TABLET, FILM COATED ORAL at 10:11

## 2024-11-23 RX ADMIN — PREGABALIN 75 MG: 75 CAPSULE ORAL at 04:11

## 2024-11-23 RX ADMIN — ISOSORBIDE DINITRATE 10 MG: 10 TABLET ORAL at 10:11

## 2024-11-23 RX ADMIN — SEVELAMER CARBONATE 800 MG: 800 TABLET, FILM COATED ORAL at 11:11

## 2024-11-23 RX ADMIN — OXYCODONE HYDROCHLORIDE AND ACETAMINOPHEN 1 TABLET: 5; 325 TABLET ORAL at 08:11

## 2024-11-23 RX ADMIN — HEPARIN SODIUM 1000 UNITS: 1000 INJECTION, SOLUTION INTRAVENOUS; SUBCUTANEOUS at 11:11

## 2024-11-23 RX ADMIN — Medication 3 ML: at 12:11

## 2024-11-23 RX ADMIN — SEVELAMER CARBONATE 800 MG: 800 TABLET, FILM COATED ORAL at 04:11

## 2024-11-23 RX ADMIN — OXYCODONE HYDROCHLORIDE AND ACETAMINOPHEN 1 TABLET: 5; 325 TABLET ORAL at 04:11

## 2024-11-23 RX ADMIN — Medication 3 ML: at 06:11

## 2024-11-23 RX ADMIN — MUPIROCIN: 20 OINTMENT TOPICAL at 10:11

## 2024-11-23 RX ADMIN — EPOETIN ALFA-EPBX 3560 UNITS: 4000 INJECTION, SOLUTION INTRAVENOUS; SUBCUTANEOUS at 08:11

## 2024-11-23 RX ADMIN — Medication 10 ML: at 06:11

## 2024-11-23 RX ADMIN — HEPARIN SODIUM 5000 UNITS: 5000 INJECTION INTRAVENOUS; SUBCUTANEOUS at 10:11

## 2024-11-23 RX ADMIN — HYDROMORPHONE HYDROCHLORIDE 0.5 MG: 1 INJECTION, SOLUTION INTRAMUSCULAR; INTRAVENOUS; SUBCUTANEOUS at 01:11

## 2024-11-23 RX ADMIN — HEPARIN SODIUM 5000 UNITS: 5000 INJECTION INTRAVENOUS; SUBCUTANEOUS at 02:11

## 2024-11-23 NOTE — NURSING TRANSFER
Nursing Transfer Note      11/22/2024   7:11 PM    Transfer To: Room 646    Transfer via bed    Transfer with cardiac monitoring    Transported by RNs    Telemetry: Rate 80  Order for Tele Monitor? Yes    Additional Lines: Suction    4eyes on Skin: yes    Medicines sent: None    Any special needs or follow-up needed: None    Patient belongings transferred with patient: No    Chart send with patient: Yes    Notified: spouse    Patient reassessed at: 11/22/2024  19:07 (date, time)

## 2024-11-23 NOTE — ASSESSMENT & PLAN NOTE
Cont eliquis once cleared by vascular surgery  Will initiate heparin drip until restart Eliquis (if okay with vascular surgery)

## 2024-11-23 NOTE — SUBJECTIVE & OBJECTIVE
Medications:  Continuous Infusions:  Scheduled Meds:   allopurinoL  50 mg Oral Every other day    aspirin  81 mg Oral Daily    atorvastatin  80 mg Oral Daily    calcitRIOL  0.5 mcg Oral Daily    carvediloL  3.125 mg Oral BID    ceFEPime IV (PEDS and ADULTS)  1 g Intravenous Q24H    cinacalcet  30 mg Oral Every Mon, Wed, Fri    clopidogreL  75 mg Oral Daily    epoetin nina-ebpx (RETACRIT) injection  50 Units/kg Intravenous Every Tues, Thurs, Sat    heparin (porcine)  5,000 Units Subcutaneous Q8H    hydrALAZINE  10 mg Oral Q12H    isosorbide dinitrate  10 mg Oral BID    mupirocin   Nasal BID    pregabalin  75 mg Oral Daily    sertraline  100 mg Oral Daily    sevelamer carbonate  800 mg Oral TID WM    sodium chloride 0.9%  3 mL Intravenous Q6H     PRN Meds:  Current Facility-Administered Medications:     acetaminophen, 1,000 mg, Oral, Q8H PRN    dextrose 10%, 12.5 g, Intravenous, PRN    dextrose 10%, 12.5 g, Intravenous, PRN    dextrose 10%, 12.5 g, Intravenous, PRN    dextrose 10%, 25 g, Intravenous, PRN    dextrose 10%, 25 g, Intravenous, PRN    dextrose 10%, 25 g, Intravenous, PRN    glucagon (human recombinant), 1 mg, Intramuscular, PRN    glucose, 16 g, Oral, PRN    glucose, 24 g, Oral, PRN    heparin (porcine), 1,000 Units, Intra-Catheter, PRN    insulin aspart U-100, 0-5 Units, Subcutaneous, QID (AC + HS) PRN    melatonin, 6 mg, Oral, Nightly PRN    morphine, 2 mg, Intravenous, Q4H PRN    naloxone, 0.02 mg, Intravenous, PRN    oxyCODONE-acetaminophen, 1 tablet, Oral, Q8H PRN    sodium chloride 0.9%, 10 mL, Intravenous, Q12H PRN    Pharmacy to dose Vancomycin consult, , , Once **AND** vancomycin - pharmacy to dose, , Intravenous, pharmacy to manage frequency     Objective:     Vital Signs (Most Recent):  Temp: 97 °F (36.1 °C) (11/23/24 0440)  Pulse: 89 (11/23/24 0815)  Resp: 16 (11/23/24 0829)  BP: (!) 113/53 (11/23/24 0815)  SpO2: (!) 94 % (11/23/24 0440) Vital Signs (24h Range):  Temp:  [97 °F (36.1 °C)-98.5  °F (36.9 °C)] 97 °F (36.1 °C)  Pulse:  [76-93] 89  Resp:  [13-18] 16  SpO2:  [94 %-100 %] 94 %  BP: ()/(49-78) 113/53          Physical Exam  Constitutional:       Appearance: She is ill-appearing.   HENT:      Head: Normocephalic.   Eyes:      Pupils: Pupils are equal, round, and reactive to light.   Cardiovascular:      Rate and Rhythm: Normal rate.      Comments: Nonpalpable femoral pulses bilaterally.   Pulmonary:      Effort: Pulmonary effort is normal.   Musculoskeletal:      Left lower leg: Edema present.      Comments: Bilateral AKA   Skin:     General: Skin is warm and dry.      Comments: L AKA wound vac in place with adequate seal   Neurological:      General: No focal deficit present.      Mental Status: She is alert. Mental status is at baseline.          Significant Labs:  All pertinent labs from the last 24 hours have been reviewed.    Significant Diagnostics:  I have reviewed all pertinent imaging results/findings within the past 24 hours.

## 2024-11-23 NOTE — PROGRESS NOTES
"[facility] S[facility]  Infectious Disease  Progress Note    Patient Name: Suyapa Connelly  MRN: [id number]  Admission Date: 11/15/2024  Length of Stay: 7 days  Attending Physician: Dimitris Rojo III*  Primary Care Provider: Vanessa Noel MD    Isolation Status: No active isolations  Assessment/Plan:      ID  * Cellulitis of left thigh  58 y/o female PMHx: bilateral BKA, ESRD on HD, DM, HTN, HLD presents with purulence from L stump wound. Recently admitted and received IV abx during that admission and on discharge was transitioned to oral augmentin and doxy and she went to SNF.  She was sent back to the ED on 11/15 for continued purulence and LLE pain.  She has been admitted to hospital medicine for further management.    ID consulted for "antibiotic management"    Blood cultures from 11/15 NGTD  With status post angioplasty and left thigh wound debridement 11/22/24 - no cultures sent    Recommendations / Plan  Continue empiric vanc and Cefepime 1 g q24 hrs - can be given post HD  Recommend wound care  Messaged vascular surgery the wound culture can be taken at next VAC change if purulent seen  May need to treat empirically x2 weeks then reimage to assess for fluid seen on MRI and if resolved    -- ID will continue to follow for further recs          Anticipated Disposition: tbd    Thank you for your consult. I will follow-up with patient. Please contact us if you have any additional questions.    VENANCIO Green  Infectious Disease  [facility] S[facility]    Subjective:     Principal Problem:Cellulitis of left thigh    HPI: 58 y/o female PMHx: bilateral BKA, ESRD on HD, DM, HTN, HLD presents with purulence from L stump wound.  She was discharged from this facility 11/12/24 with oral abx for LLE cellulitis after bring admitted for septic shock.  During that admission there were concerns for nec fasc as there was subcutaneous emphysema noted on CT, however, nec fasc was ruled out.  She did receive IV " abx during that admission and on dishcharge was transitioned to oral augmentin and doxy and she went to SNF.  She was sent back to the ED for continued purulence and LLE pain. She has been admitted to hospital medicine for further management.      -- ID will continue to follow for further recs    Interval History:   No acute events overnight  Afebrile and WBC WNL  Blood cultures finalized negative  No new wound cultures the debrided on 11/22  Denies fever, chills, sweats      Review of Systems   Constitutional:  Negative for activity change, chills, diaphoresis and fever.   Respiratory:  Negative for cough, shortness of breath and wheezing.    Cardiovascular:  Negative for chest pain.   Gastrointestinal:  Negative for abdominal pain, constipation, diarrhea, nausea and vomiting.   Genitourinary:  Negative for dysuria, frequency and urgency.   Musculoskeletal:  Positive for myalgias.   Skin:  Positive for wound.   Neurological:  Negative for dizziness.   Hematological:  Does not bruise/bleed easily.     Objective:     Vital Signs (Most Recent):  Temp: 98.8 °F (37.1 °C) (11/23/24 1539)  Pulse: 93 (11/23/24 1539)  Resp: 18 (11/23/24 1634)  BP: 124/70 (11/23/24 1539)  SpO2: 97 % (11/23/24 1539) Vital Signs (24h Range):  Temp:  [97 °F (36.1 °C)-98.8 °F (37.1 °C)] 98.8 °F (37.1 °C)  Pulse:  [79-94] 93  Resp:  [13-18] 18  SpO2:  [94 %-99 %] 97 %  BP: ()/(49-83) 124/70     Weight: 71.2 kg (156 lb 15.5 oz)  Body mass index is 47.9 kg/m².    Estimated Creatinine Clearance: 25.8 mL/min (A) (based on SCr of 1.8 mg/dL (H)).     Physical Exam  Vitals and nursing note reviewed.   Constitutional:       General: She is not in acute distress.     Appearance: Normal appearance. She is not ill-appearing.       HENT:      Head: Normocephalic and atraumatic.      Nose: Nose normal.   Cardiovascular:      Rate and Rhythm: Normal rate.   Pulmonary:      Effort: Pulmonary effort is normal. No respiratory distress.   Musculoskeletal:     "  Right Lower Extremity: Right leg is amputated above knee.      Left Lower Extremity: Left leg is amputated above knee.   Skin:     Findings: No lesion or rash.   Neurological:      Mental Status: She is alert and oriented to person, place, and time.   Psychiatric:         Mood and Affect: Mood normal.          Significant Labs: Blood Culture:   Recent Labs   Lab 06/12/24  0949 06/12/24  0954 10/29/24  1045 11/15/24  1543   LABBLOO No growth after 5 days. No growth after 5 days. No growth after 5 days.  No growth after 5 days. No growth after 5 days.  No growth after 5 days.     CBC:   Recent Labs   Lab 11/22/24  0457 11/23/24  0650 11/23/24  1436   WBC 8.25 9.20  --    HGB 7.3* 6.1* 7.7*   HCT 22.6* 18.9*  --     332  --      CMP:   Recent Labs   Lab 11/22/24  0457 11/23/24  0650 11/23/24  1436   * 128* 131*   K 4.2 4.9 3.7   CL 94* 97 97   CO2 24 21* 25   * 71 130*   BUN 19 22* 10   CREATININE 2.6* 3.3* 1.8*   CALCIUM 8.1* 8.2* 8.5*   ALBUMIN 1.9* 1.6*  --    ANIONGAP 9 10 9     Wound Culture: No results for input(s): "LABAERO" in the last 4320 hours.  All pertinent labs within the past 24 hours have been reviewed.    Significant Imaging:   Procedure Component Value Units Date/Time   VAS Ankle Brachial Indices Resting [[id number]] Collected: 11/20/24 1325   Order Status: Completed Updated: 11/22/24 0832   Narrative:       Indication  ========    -Peripheral Vascular Disease, Non Healing Left Wound    History  ======    General History  Other: -Hx of Tyrell AKA    Pressure Lower  ============    Rt brachial A syst BP  162 mmHg    Impression  =========    Right Leg: Above knee amputation. Thigh PVR waveform suggest moderate peripheral arterial occlusive disease.    Left Leg: Above knee amputation. Thigh PVR waveform suggest mild peripheral arterial occlusive disease.      DATE OF SERVICE: 11/20/2024                                                      Sonographer: Dinesh ZAMUDIO" Weston  Electronically Signed by: Zbigniew Prakashannika MANN at 11/22/2024-08:32   CTA Runoff ABD PEL Bilat Lower Ext [[id number]] Resulted: 11/20/24 0758   Order Status: Completed Updated: 11/20/24 0801   Narrative:     EXAMINATION:  CTA RUNOFF ABD PEL BILAT LOWER EXT    CLINICAL HISTORY:  nonhealing wound;    TECHNIQUE:  Prior to as well as following the intravenous administration of 100 cc Omnipaque 350 CTA of the abdomen pelvis and bilateral lower extremities was performed.  Axial sagittal and coronal reformatted images as well as MIP and 3D images submitted.    COMPARISON:  CT abdomen pelvis June 25, 2024 and CTA left lower extremity September 30, 2024    FINDINGS:  In the chest, small bilateral pleural effusions noted.  Significant cardiomegaly.  There is some fluid in the fissures.  Lungs are hypoaerated.  Motion artifact noted.  Presume linear bands of atelectasis.  No confluent consolidation.    In the abdomen, liver is enlarged 18.8 cm.  Significant reflux of contrast into the hepatic veins suggest elevated right heart pressure.  No convincing hepatic mass.  Gallbladder mildly distended.  Few mm stones layering in the gallbladder.  Pancreas is somewhat atrophic.  No convincing pancreatic mass.  Spleen not enlarged.  Heterogeneous enhancement likely contrast bolus timing.  No adrenal masses.  Some atrophic change in the kidneys though no convincing mass.  No convincing para-aortic adenopathy.    Likewise in the pelvis, no convincing pelvic adenopathy.  2.1 cm fluid density left groin similar.  Bladder not distended.  Numerous calcifications about the enlarged uterus presumably fibroids.    Evaluation of the bowel demonstrates scattered stool and bowel gas.  No focal dilatation.  Portal vein not yet opacified.  No convincing adenopathy    Evaluation of the aorta demonstrates extensive diffuse calcified plaque throughout the visualized vascular system.  Celiac and SMA are patent.  Circumferential plaque noted  extending into the SMA.  Bilateral renal arteries are patent though again extensive plaque.  Accessory left renal artery noted.  NIDA appears patent.  Aorta is patent to the bifurcation.    Significant plaque extends into the right common iliac with at least 60-80% diameter narrowing.  Plaque extends into the internal and external iliac artery with diffuse circumferential plaque resulting in 70-90% diameter narrowing of the external iliac.  Diffuse plaque extends into the right common femoral artery to the bifurcation.  Extensive plaque in the right SFA with high-grade stenosis multifocal.  Plaque circumferential in the profundus femoral branches.  Diffuse plaque in the right SFA with focal areas of short segment occlusion.  Multiple collateral vessels are noted though the majority of the visualized SFA is occluded.    On the left, there is occlusion of the proximal left common iliac artery.  There is reconstitution more distally though again diffuse circumferential severe plaque involves both the internal and external iliac arteries.  External iliac artery is patent with diffuse plaque approximately 60-80% diameter narrowing.  Left common femoral is patent.  Multiple calcifications at the bifurcation.  Occluded left SFA bypass graft.  Native left SFA also is occluded.  Severe plaque extends into the profundus femoral branches.  Only collateral vessels are identified in the left thigh.  Diffuse calcified plaque in the occluded left SFA noted.    Soft tissues demonstrate diffuse anasarca.  There appears to be a wound at the left AKA stump medially.  Some air in the soft tissues medially as well.  Anasarca extends into the abdominopelvic wall.  Small fat containing umbilical hernia.    Evaluation of the bones demonstrate degenerative changes.  Bilateral AKA noted.  No convincing bony destruction.   Impression:       Extensive calcified plaque throughout the visualized vascular system with details as above.    Bilateral  AKA.  There is significant soft tissue stranding bilaterally left greater than right consistent with a history of cellulitis.  There also appears to be a skin defect in the medial soft tissues about the stump with some air.    Significant cardiomegaly with reflux of contrast into the hepatic veins suggesting elevated right heart pressure.  Hepatomegaly.    Small bilateral effusions.    Cholelithiasis.    Atrophic changes in the kidneys.    Additional findings above.      Electronically signed by: Abram Carvalho MD  Date: 11/20/2024  Time: 07:58   MRI Femur WO Contrast LT [[id number]] Resulted: 11/19/24 1433   Order Status: Completed Updated: 11/19/24 1435   Narrative:     EXAMINATION:  MRI FEMUR WO CONTRAST LT    CLINICAL HISTORY:  osteo;    TECHNIQUE:  MRI of the left femur without intravenous contrast.    COMPARISON:  CT thigh 11/13/2024    FINDINGS:  Images are degraded by susceptibility artifact and aliasing.    BONE: Postoperative changes from above the knee amputation.  No bone marrow signal changes or erosions to suggest osteomyelitis.  No fracture or osteonecrosis.    JOINT: No hip joint effusion.    SOFT TISSUE: Diffuse muscle atrophy with corresponding edema like signal.  Diffuse subcutaneous and intermuscular edema.  There is a 3.5 x 3.5 x 1.6 cm fluid collection at the distal femoral stump (4:70).  There are 2 fluid collections in the left groin measuring up to 2.4 cm.  There is a soft tissue defect in the left medial thigh measuring approximately 7.5 x 5.0 cm.    MISCELLANEOUS: No inguinal lymphadenopathy.  The vascular graft in the left anterior thigh.   Impression:       Postoperative changes from above the knee amputation.    3.5 cm fluid collection at the femoral stump, which could represent a seroma, hematoma, or abscess.  No evidence of osteomyelitis.    Two fluid collections in the left groin measuring up to 2.4 cm.  Differential considerations include seroma, hematoma, abscess, and fluid  containing inguinal hernia.    Diffuse subcutaneous and intermuscular edema, which could be inflammatory or infectious.    Diffuse muscle atrophy with corresponding edema like signal, which could represent denervation or myositis.      Electronically signed by: Van Echeverria  Date: 11/19/2024  Time: 14:33      Imaging History     2024    Date Procedure Name Study Review Link PACS Link Status Accession Number Location   11/23/24 07:15 AM Cardiac monitoring strips Study Review  Final     11/20/24 01:25 PM VAS Ankle Brachial Indices Resting Study Review  Final [id number]    11/19/24 05:34 PM Cardiac monitoring strips Study Review  Final     11/19/24 04:28 PM CTA Runoff ABD PEL Bilat Lower Ext Study Review  Images Final [id number] [facility]   11/19/24 02:24 PM Cardiac monitoring strips Study Review  Final     11/19/24 02:12 PM MRI Femur WO Contrast LT Study Review  Images Final [id number] [facility]   11/19/24 12:47 PM Cardiac monitoring strips Study Review  Final     11/17/24 09:35 AM Cardiac monitoring strips Study Review  Final     11/17/24 09:35 AM Cardiac monitoring strips Study Review  Final     11/16/24 02:22 AM MRI Limited Non-Billable Study Review  Images Final [id number] [facility]   11/15/24 04:01 PM X-Ray Chest AP Portable Study Review  Images Final [id number] [facility]   11/14/24 06:47 AM CT Head Without Contrast Study Review  Images Final [id number] [facility]   11/13/24 10:00 PM CT Thigh Without Contrast Left Study Review  Images Final [id number] [facility]   11/13/24 09:43 PM X-Ray Femur 2 View Left Study Review  Images Final [id number] [facility]   10/29/24 04:17 PM US Abdomen Limited Study Review  Images Final [id number] [facility]   10/29/24 12:18 PM Echo Saline Bubble? No; Ultrasound enhancing contrast? No Study Review  Images Final [id number] [facility]   10/29/24 12:10 PM X-Ray Chest 1 View Study Review  Images Final [id number] [facility]   10/28/24 03:21 PM CT Thigh Without Contrast Left Study Review  Images Final [id number] [facility]   10/28/24 12:00 AM Cardiac monitoring  strips Study Review  Final     10/28/24 12:00 AM Cardiac monitoring strips Study Review  Final     10/28/24 12:00 AM Cardiac monitoring strips Study Review  Final

## 2024-11-23 NOTE — ASSESSMENT & PLAN NOTE
"60 y/o female PMHx: bilateral BKA, ESRD on HD, DM, HTN, HLD presents with purulence from L stump wound. Recently admitted and received IV abx during that admission and on discharge was transitioned to oral augmentin and doxy and she went to SNF.  She was sent back to the ED on 11/15 for continued purulence and LLE pain.  She has been admitted to hospital medicine for further management.    ID consulted for "antibiotic management"    Blood cultures from 11/15 NGTD  With status post angioplasty and left thigh wound debridement 11/22/24 - no cultures sent    Recommendations / Plan  Continue empiric vanc and Cefepime 1 g q24 hrs - can be given post HD  Recommend wound care  Messaged vascular surgery the wound culture can be taken at next VAC change if purulent seen  May need to treat empirically x2 weeks then reimage to assess for fluid seen on MRI and if resolved    -- ID will continue to follow for further recs    "

## 2024-11-23 NOTE — ASSESSMENT & PLAN NOTE
First MR non diagnostic due to motion artifact  Repeat MR with sedation postoperative changes from above the knee amputation, 3.5 cm fluid collection at the femoral stump, which could represent a seroma, hematoma, or abscess; no evidence of osteomyelitis.  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  The external iliac artery is patent with diffuse plaque approximately 60-80% diameter narrowing.  The left common femoral is patent.  Multiple calcifications at the bifurcation.  Occluded left SFA bypass graft. Native left SFA also is occluded.  Severe plaque extends into the profundus femoral branches.  Only collateral vessels are identified in the left thigh.  Diffuse calcified plaque in the occluded left SFA noted.    Cont vanc/cefepime  Blood cultures NGTD  ID consulted, cont IV antibiotics.  Vascular surgery consulted, surgical intervention 11/22

## 2024-11-23 NOTE — ASSESSMENT & PLAN NOTE
58yo female whose PMH includes bilateral AKA, L 2021 s/p thrombosed bypass, R 2022 ESRD, DM, HTN, HLD presents with purulence from L stump wound.  She was discharged from this facility 11.12.24 with oral abx or LLE cellulitis after bring admitted for septic shock. She did receive IV abx during that admission and on dishcharge was transitioned to oral augmentin and doxy and she went to SNF.  She was sent back to the ED for continued purulence and LLE pain. Now s/p LLE angio and debridement 11/22    - Patient seen and examined at bedside  - Wound vac with adequate seal, continue to monitor output  - No groin hematoma at access site appreciated  - Will need wound vac after discharge  - Rest of care per primary

## 2024-11-23 NOTE — NURSING
Arrived in [facility] without [facility] wrapped in gauze.  Tape was removed from both lumens.  Blood present in venous lumen.  Both lumens aspirate and flush easily.  RAUL Monique DNP notified.    Dialysis started to [facility].  Tolerated well.

## 2024-11-23 NOTE — PROGRESS NOTES
"[facility] Medicine  Progress Note    Patient Name: Suyapa Connelly  MRN: [id number]  Patient Class: IP- Inpatient   Admission Date: 11/15/2024  Length of Stay: 6 days  Attending Physician: Dimitris Rojo III*  Primary Care Provider: Vanessa Noel MD        Subjective:     Principal Problem:Cellulitis of left thigh        HPI:  58yo female whose PMH includes bilateral BKA, ESRD, DM, HTN, HLD presents with purulence from L stump wound.  She was discharged from this facility 11.12.24 with oral abx or LLE cellulitis after bring admitted for septic shock.  During that admission general surgery was consulted for further evaluation d/t concerns for nec fasc as there was subcutaneous emphysema noted on CT; after their clinical assessment nec fasc was ruled out.  She did receive IV abx during that admission and on discharge was transitioned to oral augmentin and doxy and she went to SNF.  She was sent back to the ED for continued purulence and LLE pain.  Surgery was consulted in the ED and have no plans for intervention at this time.  MR is pending.  White count is normal.  She has been admitted to medicine for further management.    Overview/Hospital Course:  Per prior attending physician, Dr. Traore:  "Admitted for LLE cellulitis; failed outpatient oral abx.  Discharged from this facility 11.12.24 with oral abx or LLE cellulitis after bring admitted for septic shock; on discharge was transitioned to oral augmentin and doxy and she went to SNF.  Sent back to the ED for continued purulence and LLE pain.  Surgery was consulted in the ED and have no plans for intervention at this time.  Repeat CT here showed postop change of bilateral above knee amputation, no destructive osseous process to suggest osteomyelitis in the remaining portions of the left femur or in the visualized proximal portion of the cut right femur, skin defect along the medial aspect of the left thigh with foci of air " "underlying the wound, additional area of soft tissue contour indentation and focal skin thickening along the anterior thigh, suggestive of possible infectious or inflammatory process versus region of scarring, additional extensive soft tissue edema and skin thickening of the pelvis, partially visualized right lower extremity, and left lower extremity.  MR non diagnostic due to motion artifact.  White count is normal.  Receiving vanc/cefepime; ID consulted.  Repeat MR with sedation postoperative changes from above the knee amputation, 3.5 cm fluid collection at the femoral stump, which could represent a seroma, hematoma, or abscess; no evidence of osteomyelitis.  There are two fluid collections in the left groin measuring up to 2.4 cm, diffuse subcutaneous and intermuscular edema, which could be inflammatory or infectious, diffuse muscle atrophy with corresponding edema like signal, which could represent denervation or myositis.  CTA with runoff showed on the left, there is occlusion of the proximal left common iliac artery with reconstitution more distally though again diffuse circumferential severe plaque involves both the internal and external iliac arteries.  The external iliac artery is patent with diffuse plaque approximately 60-80% diameter narrowing.  The left common femoral is patent.  Multiple calcifications at the bifurcation.  Occluded left SFA bypass graft. Native left SFA also is occluded.  Severe plaque extends into the profundus femoral branches.  Only collateral vessels are identified in the left thigh.  Diffuse calcified plaque in the occluded left SFA noted.  Other findings noted in the result report.  Vascular surgery consulted; tentative plan for intervention 11.22.24."    OR 11/22 with vascular surgery:  Aortogram and BLE angiogram with left common iliac stent placement and left AKA stump debridement.    Interval History:  Pt seen and examined in PACU following surgery.  Wakes easily to voice " but somnolent from anesthesia and falls quickly back to sleep.    Labs personally reviewed:  WBC 8.25, Hb 7.3, Na 127, Cr 2.6.    Vascular surgery consulted: OR today    Objective:     Vital Signs (Most Recent):  Temp: 97.5 °F (36.4 °C) (11/22/24 1929)  Pulse: 80 (11/22/24 1929)  Resp: 18 (11/22/24 1929)  BP: (!) 106/53 (11/22/24 1929)  SpO2: (!) 94 % (11/22/24 1929) Vital Signs (24h Range):  Temp:  [97.5 °F (36.4 °C)-99.2 °F (37.3 °C)] 97.5 °F (36.4 °C)  Pulse:  [76-86] 80  Resp:  [13-18] 18  SpO2:  [94 %-100 %] 94 %  BP: ()/(50-78) 106/53     Weight: 71.2 kg (156 lb 15.5 oz)  Body mass index is 47.9 kg/m².    Intake/Output Summary (Last 24 hours) at 11/22/2024 2018  Last data filed at 11/22/2024 1659  Gross per 24 hour   Intake 75 ml   Output --   Net 75 ml         Physical Exam  Constitutional:       Appearance: She is ill-appearing (chronically).   Cardiovascular:      Rate and Rhythm: Normal rate and regular rhythm.   Pulmonary:      Effort: Pulmonary effort is normal. No respiratory distress.   Abdominal:      General: Abdomen is flat. Bowel sounds are normal.   Musculoskeletal:         General: Deformity (bilateral BKA) present.      Left lower leg: Edema (Wound VAC in place) present.   Neurological:      General: No focal deficit present.      Mental Status: She is alert.      Comments: Somnolent   Psychiatric:      Comments: Unable to assess d/t anesthesia               Assessment/Plan:      * Cellulitis of left thigh  First MR non diagnostic due to motion artifact  Repeat MR with sedation postoperative changes from above the knee amputation, 3.5 cm fluid collection at the femoral stump, which could represent a seroma, hematoma, or abscess; no evidence of osteomyelitis.  There are two fluid collections in the left groin measuring up to 2.4 cm, diffuse subcutaneous and intermuscular edema, which could be inflammatory or infectious, diffuse muscle atrophy with corresponding edema like signal, which  could represent denervation or myositis  CTA with runoff showed on the left, there is occlusion of the proximal left common iliac artery with reconstitution more distally though again diffuse circumferential severe plaque involves both the internal and external iliac arteries.  The external iliac artery is patent with diffuse plaque approximately 60-80% diameter narrowing.  The left common femoral is patent.  Multiple calcifications at the bifurcation.  Occluded left SFA bypass graft. Native left SFA also is occluded.  Severe plaque extends into the profundus femoral branches.  Only collateral vessels are identified in the left thigh.  Diffuse calcified plaque in the occluded left SFA noted.    Cont vanc/cefepime  Blood cultures NGTD  ID consulted, cont IV antibiotics.  Vascular surgery consulted, surgical intervention 11/22    Severe obesity (BMI >= 40)  Body mass index is 47.9 kg/m².  Would benefit from dietary and lifestyle modifications in relation to health  Consider dietary/nutrition consult outpatient        ESRD (end stage renal disease)  Consult nephro for HD management. HD TThS    Paroxysmal atrial fibrillation  Cont eliquis once cleared by vascular surgery  Will initiate heparin drip until restart Eliquis (if okay with vascular surgery)    Anemia due to chronic kidney disease, on chronic dialysis  Close monitoring  No indication for transfusion at this time  Goal directed resuscitation, transfuse for Hgb<7    CAD (coronary artery disease)  Cont plavix, eliquis  Cont statin    Type 2 diabetes mellitus with hyperglycemia, with long-term current use of insulin  SSI  Diabetic diet when not NPO  ACHS glucose checks    CAROLIN (generalized anxiety disorder)  Cont zoloft    Peripheral vascular disease  Cont statin  Cont eliquis and plavix  See plan for cellulitis      VTE Risk Mitigation (From admission, onward)           Ordered     heparin (porcine) injection 5,000 Units  Every 8 hours         11/20/24 1622     IP  VTE HIGH RISK PATIENT  Once         11/20/24 1622     heparin (porcine) injection 1,000 Units  As needed (PRN)         11/16/24 1159     Place sequential compression device  Until discontinued         11/15/24 1648     Reason for No Pharmacological VTE Prophylaxis  Once        Question:  Reasons:  Answer:  Already adequately anticoagulated on oral Anticoagulants    11/15/24 1648                    Discharge Planning   DEVAN: 11/24/2024     Code Status: Full Code   Is the patient medically ready for discharge?:     Reason for patient still in hospital (select all that apply): Patient trending condition, Laboratory test, Treatment, Consult recommendations, PT / OT recommendations, and Pending disposition  Discharge Plan A: Return to nursing home        Dimitris Rojo III, MD  Department of Hospital Medicine   [facility]

## 2024-11-23 NOTE — PROGRESS NOTES
[facility]FF HEMODIALYSIS NOTE     Patient currently on hemodialysis for removal of uremic toxins and volume.     Patient seen and evaluated on hemodialysis, tolerating treatment, see HD flowsheet for vitals and assessments.    Labs have been reviewed and the dialysate bath has been adjusted.       Assessment/Plan:    -Hyponatremia in setting of ESRD. 1L fluid restrictions. NA bath adjusted   -Hgb 6.1, plan to transfuse 1 unit prbc  -Patient seen on HD, tolerating treatment well, w/o complaints   -UF goal of 2.5  -Renal diet, if not NPO   -Strict I/O's and daily weights  -Daily renal function panels  -Keep MAP >65 while on HD   -Hgb goal 10-11, epo with HD  -continue sevelamer   -continue calcitriol   -continue cinacaclet   -Will continue to follow while inpatient     Katie Monique DNP-FNP, C  Nephrology  Pager: [phone]

## 2024-11-23 NOTE — PROGRESS NOTES
Pharmacokinetic Assessment Follow Up: IV Vancomycin    Vancomycin serum concentration assessment(s):    Vancomycin Regimen Plan:    Random level resulted as 27.6 mcg/mL  Goal level 10-15 mcg/mL  ESRD on HD TTS. Last HD on 11/23  Will not dose additional Vancomycin today.   Re-dose when level < 15 mcg/mL  Next random level due 11/26 at 0400 with AM labs (prior to next scheduled HD)     Drug levels (last 3 results):  Recent Labs   Lab Result Units 11/21/24  0434 11/23/24  0650   Vancomycin, Random ug/mL 18.2 27.6       Pharmacy will continue to follow and monitor vancomycin.    Please contact pharmacy at extension [phone]   for questions regarding this assessment.    Thank you for the consult,   Rizwan Fernandez       Patient brief summary:  Suyapa Connelly is a 59 y.o. female initiated on antimicrobial therapy with IV Vancomycin for treatment of skin & soft tissue infection    Drug Allergies:   Review of patient's allergies indicates:   Allergen Reactions    Ciprofloxacin Itching    Pcn [penicillins]      Rash; tolerated ceftriaxone on 1/13/20  Tolerating Augmentin November 2024       Actual Body Weight:   71.2 kg    Renal Function:   Estimated Creatinine Clearance: 14.1 mL/min (A) (based on SCr of 3.3 mg/dL (H)).,     Dialysis Method (if applicable):  intermittent HD on TTS    CBC (last 72 hours):  Recent Labs   Lab Result Units 11/21/24  0434 11/22/24  0457 11/23/24  0650   WBC K/uL 6.72 8.25 9.20   Hemoglobin g/dL 7.0* 7.3* 6.1*   Hematocrit % 21.5* 22.6* 18.9*   Platelets K/uL 294 301 332       Metabolic Panel (last 72 hours):  Recent Labs   Lab Result Units 11/21/24  0434 11/21/24  0810 11/21/24  1136 11/22/24  0457 11/23/24  0650   Sodium mmol/L 124* 124* 130* 127* 128*   Potassium mmol/L 4.4  --   --  4.2 4.9   Chloride mmol/L 92*  --   --  94* 97   CO2 mmol/L 21*  --   --  24 21*   Glucose mg/dL 95  --   --  115* 71   BUN mg/dL 32*  --   --  19 22*   Creatinine mg/dL 3.5*  --   --  2.6* 3.3*   Albumin g/dL 1.9*   --   --  1.9* 1.6*   Magnesium mg/dL 1.8  --   --  1.9 1.8   Phosphorus mg/dL 4.2  --   --  3.1 4.1       Vancomycin Administrations:  vancomycin given in the last 96 hours                     vancomycin (VANCOCIN) 1,000 mg in 0.9% NaCl 250 mL IVPB (mg) 1,000 mg New Bag 11/22/24 1511    vancomycin (VANCOCIN) 500 mg in D5W 100 mL IVPB (MB+) (mg) 500 mg New Bag 11/20/24 1500                    Microbiologic Results:  Microbiology Results (last 7 days)       Procedure Component Value Units Date/Time    Blood culture x two cultures. Draw prior to antibiotics. [[id number]] Collected: 11/15/24 1543    Order Status: Completed Specimen: Blood from Peripheral, Antecubital, Left Updated: 11/20/24 1812     Blood Culture, Routine No growth after 5 days.    Narrative:      Aerobic and anaerobic    Blood culture x two cultures. Draw prior to antibiotics. [[id number]] Collected: 11/15/24 1543    Order Status: Completed Specimen: Blood from Peripheral, Forearm, Right Updated: 11/20/24 1812     Blood Culture, Routine No growth after 5 days.    Narrative:      Aerobic and anaerobic

## 2024-11-23 NOTE — NURSING
3.5 hour dialysis complete.  Blood returned.  J PC flushed, heparin locked, capped and wrapped in gauze.    Net UF 2.5L.      Epo given.    1 unit PRBC's given.    Tolerated well.    Transported from [facility] to room 646 via bed by transporters.

## 2024-11-23 NOTE — NURSING
Patient was c/o that both IV were hurting. Consult was put in for Midline team to put in a peripheral IV as she is a hard stick. MD was made aware.

## 2024-11-23 NOTE — ASSESSMENT & PLAN NOTE
Body mass index is 47.9 kg/m².  Would benefit from dietary and lifestyle modifications in relation to health  Consider dietary/nutrition consult outpatient

## 2024-11-23 NOTE — SUBJECTIVE & OBJECTIVE
Interval History:   No acute events overnight  Afebrile and WBC WNL  Blood cultures finalized negative  No new wound cultures the debrided on 11/22  Denies fever, chills, sweats      Review of Systems   Constitutional:  Negative for activity change, chills, diaphoresis and fever.   Respiratory:  Negative for cough, shortness of breath and wheezing.    Cardiovascular:  Negative for chest pain.   Gastrointestinal:  Negative for abdominal pain, constipation, diarrhea, nausea and vomiting.   Genitourinary:  Negative for dysuria, frequency and urgency.   Musculoskeletal:  Positive for myalgias.   Skin:  Positive for wound.   Neurological:  Negative for dizziness.   Hematological:  Does not bruise/bleed easily.     Objective:     Vital Signs (Most Recent):  Temp: 98.8 °F (37.1 °C) (11/23/24 1539)  Pulse: 93 (11/23/24 1539)  Resp: 18 (11/23/24 1634)  BP: 124/70 (11/23/24 1539)  SpO2: 97 % (11/23/24 1539) Vital Signs (24h Range):  Temp:  [97 °F (36.1 °C)-98.8 °F (37.1 °C)] 98.8 °F (37.1 °C)  Pulse:  [79-94] 93  Resp:  [13-18] 18  SpO2:  [94 %-99 %] 97 %  BP: ()/(49-83) 124/70     Weight: 71.2 kg (156 lb 15.5 oz)  Body mass index is 47.9 kg/m².    Estimated Creatinine Clearance: 25.8 mL/min (A) (based on SCr of 1.8 mg/dL (H)).     Physical Exam  Vitals and nursing note reviewed.   Constitutional:       General: She is not in acute distress.     Appearance: Normal appearance. She is not ill-appearing.       HENT:      Head: Normocephalic and atraumatic.      Nose: Nose normal.   Cardiovascular:      Rate and Rhythm: Normal rate.   Pulmonary:      Effort: Pulmonary effort is normal. No respiratory distress.   Musculoskeletal:      Right Lower Extremity: Right leg is amputated above knee.      Left Lower Extremity: Left leg is amputated above knee.   Skin:     Findings: No lesion or rash.   Neurological:      Mental Status: She is alert and oriented to person, place, and time.   Psychiatric:         Mood and Affect: Mood  "normal.          Significant Labs: Blood Culture:   Recent Labs   Lab 06/12/24  0949 06/12/24  0954 10/29/24  1045 11/15/24  1543   LABBLOO No growth after 5 days. No growth after 5 days. No growth after 5 days.  No growth after 5 days. No growth after 5 days.  No growth after 5 days.     CBC:   Recent Labs   Lab 11/22/24  0457 11/23/24  0650 11/23/24  1436   WBC 8.25 9.20  --    HGB 7.3* 6.1* 7.7*   HCT 22.6* 18.9*  --     332  --      CMP:   Recent Labs   Lab 11/22/24  0457 11/23/24  0650 11/23/24  1436   * 128* 131*   K 4.2 4.9 3.7   CL 94* 97 97   CO2 24 21* 25   * 71 130*   BUN 19 22* 10   CREATININE 2.6* 3.3* 1.8*   CALCIUM 8.1* 8.2* 8.5*   ALBUMIN 1.9* 1.6*  --    ANIONGAP 9 10 9     Wound Culture: No results for input(s): "LABAERO" in the last 4320 hours.  All pertinent labs within the past 24 hours have been reviewed.    Significant Imaging:   Procedure Component Value Units Date/Time   VAS Ankle Brachial Indices Resting [[id number]] Collected: 11/20/24 1325   Order Status: Completed Updated: 11/22/24 0832   Narrative:       Indication  ========    -Peripheral Vascular Disease, Non Healing Left Wound    History  ======    General History  Other: -Hx of Tyrell AKA    Pressure Lower  ============    Rt brachial A syst BP  162 mmHg    Impression  =========    Right Leg: Above knee amputation. Thigh PVR waveform suggest moderate peripheral arterial occlusive disease.    Left Leg: Above knee amputation. Thigh PVR waveform suggest mild peripheral arterial occlusive disease.      DATE OF SERVICE: 11/20/2024                                                      Sonographer: Dinesh Ontiveros  Electronically Signed by: Zbigniew Florez II at 11/22/2024-08:32   CTA Runoff ABD PEL Bilat Lower Ext [[id number]] Resulted: 11/20/24 0758   Order Status: Completed Updated: 11/20/24 0801   Narrative:     EXAMINATION:  CTA RUNOFF ABD PEL BILAT LOWER EXT    CLINICAL HISTORY:  nonhealing " wound;    TECHNIQUE:  Prior to as well as following the intravenous administration of 100 cc Omnipaque 350 CTA of the abdomen pelvis and bilateral lower extremities was performed.  Axial sagittal and coronal reformatted images as well as MIP and 3D images submitted.    COMPARISON:  CT abdomen pelvis June 25, 2024 and CTA left lower extremity September 30, 2024    FINDINGS:  In the chest, small bilateral pleural effusions noted.  Significant cardiomegaly.  There is some fluid in the fissures.  Lungs are hypoaerated.  Motion artifact noted.  Presume linear bands of atelectasis.  No confluent consolidation.    In the abdomen, liver is enlarged 18.8 cm.  Significant reflux of contrast into the hepatic veins suggest elevated right heart pressure.  No convincing hepatic mass.  Gallbladder mildly distended.  Few mm stones layering in the gallbladder.  Pancreas is somewhat atrophic.  No convincing pancreatic mass.  Spleen not enlarged.  Heterogeneous enhancement likely contrast bolus timing.  No adrenal masses.  Some atrophic change in the kidneys though no convincing mass.  No convincing para-aortic adenopathy.    Likewise in the pelvis, no convincing pelvic adenopathy.  2.1 cm fluid density left groin similar.  Bladder not distended.  Numerous calcifications about the enlarged uterus presumably fibroids.    Evaluation of the bowel demonstrates scattered stool and bowel gas.  No focal dilatation.  Portal vein not yet opacified.  No convincing adenopathy    Evaluation of the aorta demonstrates extensive diffuse calcified plaque throughout the visualized vascular system.  Celiac and SMA are patent.  Circumferential plaque noted extending into the SMA.  Bilateral renal arteries are patent though again extensive plaque.  Accessory left renal artery noted.  NIDA appears patent.  Aorta is patent to the bifurcation.    Significant plaque extends into the right common iliac with at least 60-80% diameter narrowing.  Plaque extends  into the internal and external iliac artery with diffuse circumferential plaque resulting in 70-90% diameter narrowing of the external iliac.  Diffuse plaque extends into the right common femoral artery to the bifurcation.  Extensive plaque in the right SFA with high-grade stenosis multifocal.  Plaque circumferential in the profundus femoral branches.  Diffuse plaque in the right SFA with focal areas of short segment occlusion.  Multiple collateral vessels are noted though the majority of the visualized SFA is occluded.    On the left, there is occlusion of the proximal left common iliac artery.  There is reconstitution more distally though again diffuse circumferential severe plaque involves both the internal and external iliac arteries.  External iliac artery is patent with diffuse plaque approximately 60-80% diameter narrowing.  Left common femoral is patent.  Multiple calcifications at the bifurcation.  Occluded left SFA bypass graft.  Native left SFA also is occluded.  Severe plaque extends into the profundus femoral branches.  Only collateral vessels are identified in the left thigh.  Diffuse calcified plaque in the occluded left SFA noted.    Soft tissues demonstrate diffuse anasarca.  There appears to be a wound at the left AKA stump medially.  Some air in the soft tissues medially as well.  Anasarca extends into the abdominopelvic wall.  Small fat containing umbilical hernia.    Evaluation of the bones demonstrate degenerative changes.  Bilateral AKA noted.  No convincing bony destruction.   Impression:       Extensive calcified plaque throughout the visualized vascular system with details as above.    Bilateral AKA.  There is significant soft tissue stranding bilaterally left greater than right consistent with a history of cellulitis.  There also appears to be a skin defect in the medial soft tissues about the stump with some air.    Significant cardiomegaly with reflux of contrast into the hepatic veins  suggesting elevated right heart pressure.  Hepatomegaly.    Small bilateral effusions.    Cholelithiasis.    Atrophic changes in the kidneys.    Additional findings above.      Electronically signed by: Abram Carvalho MD  Date: 11/20/2024  Time: 07:58   MRI Femur WO Contrast LT [[id number]] Resulted: 11/19/24 1433   Order Status: Completed Updated: 11/19/24 1435   Narrative:     EXAMINATION:  MRI FEMUR WO CONTRAST LT    CLINICAL HISTORY:  osteo;    TECHNIQUE:  MRI of the left femur without intravenous contrast.    COMPARISON:  CT thigh 11/13/2024    FINDINGS:  Images are degraded by susceptibility artifact and aliasing.    BONE: Postoperative changes from above the knee amputation.  No bone marrow signal changes or erosions to suggest osteomyelitis.  No fracture or osteonecrosis.    JOINT: No hip joint effusion.    SOFT TISSUE: Diffuse muscle atrophy with corresponding edema like signal.  Diffuse subcutaneous and intermuscular edema.  There is a 3.5 x 3.5 x 1.6 cm fluid collection at the distal femoral stump (4:70).  There are 2 fluid collections in the left groin measuring up to 2.4 cm.  There is a soft tissue defect in the left medial thigh measuring approximately 7.5 x 5.0 cm.    MISCELLANEOUS: No inguinal lymphadenopathy.  The vascular graft in the left anterior thigh.   Impression:       Postoperative changes from above the knee amputation.    3.5 cm fluid collection at the femoral stump, which could represent a seroma, hematoma, or abscess.  No evidence of osteomyelitis.    Two fluid collections in the left groin measuring up to 2.4 cm.  Differential considerations include seroma, hematoma, abscess, and fluid containing inguinal hernia.    Diffuse subcutaneous and intermuscular edema, which could be inflammatory or infectious.    Diffuse muscle atrophy with corresponding edema like signal, which could represent denervation or myositis.      Electronically signed by: Van Echeverria  Date: 11/19/2024  Time: 14:33       Imaging History     2024    Date Procedure Name Study Review Link PACS Link Status Accession Number Location   11/23/24 07:15 AM Cardiac monitoring strips Study Review  Final     11/20/24 01:25 PM VAS Ankle Brachial Indices Resting Study Review  Final [id number]    11/19/24 05:34 PM Cardiac monitoring strips Study Review  Final     11/19/24 04:28 PM CTA Runoff ABD PEL Bilat Lower Ext Study Review  Images Final [id number] [facility]   11/19/24 02:24 PM Cardiac monitoring strips Study Review  Final     11/19/24 02:12 PM MRI Femur WO Contrast LT Study Review  Images Final [id number] [facility]   11/19/24 12:47 PM Cardiac monitoring strips Study Review  Final     11/17/24 09:35 AM Cardiac monitoring strips Study Review  Final     11/17/24 09:35 AM Cardiac monitoring strips Study Review  Final     11/16/24 02:22 AM MRI Limited Non-Billable Study Review  Images Final [id number] [facility]   11/15/24 04:01 PM X-Ray Chest AP Portable Study Review  Images Final [id number] [facility]   11/14/24 06:47 AM CT Head Without Contrast Study Review  Images Final [id number] [facility]   11/13/24 10:00 PM CT Thigh Without Contrast Left Study Review  Images Final [id number] [facility]   11/13/24 09:43 PM X-Ray Femur 2 View Left Study Review  Images Final [id number] [facility]   10/29/24 04:17 PM US Abdomen Limited Study Review  Images Final [id number] [facility]   10/29/24 12:18 PM Echo Saline Bubble? No; Ultrasound enhancing contrast? No Study Review  Images Final [id number] [facility]   10/29/24 12:10 PM X-Ray Chest 1 View Study Review  Images Final [id number] [facility]   10/28/24 03:21 PM CT Thigh Without Contrast Left Study Review  Images Final [id number] [facility]   10/28/24 12:00 AM Cardiac monitoring strips Study Review  Final     10/28/24 12:00 AM Cardiac monitoring strips Study Review  Final     10/28/24 12:00 AM Cardiac monitoring strips Study Review  Final

## 2024-11-23 NOTE — PROGRESS NOTES
[facility]  Vascular Surgery  Progress Note    Patient Name: Suyapa Connelly  MRN: [id number]  Admission Date: 11/15/2024  Primary Care Provider: Vanessa Noel MD    Subjective:     Interval History: NAEON. Patient seen this morning in dialysis. Wound vac in place with adequate seal. Having some pain about the wound vac site, but otherwise with no complaints.     Post-Op Info:  Procedure(s) (LRB):  ANGIOGRAM, EXTREMITY, UNILATERAL (Left)  IRRIGATION AND DEBRIDEMENT, LOWER EXTREMITY (Left)  STENT, ILIAC ARTERY (Left)   1 Day Post-Op     Medications:  Continuous Infusions:  Scheduled Meds:   allopurinoL  50 mg Oral Every other day    aspirin  81 mg Oral Daily    atorvastatin  80 mg Oral Daily    calcitRIOL  0.5 mcg Oral Daily    carvediloL  3.125 mg Oral BID    ceFEPime IV (PEDS and ADULTS)  1 g Intravenous Q24H    cinacalcet  30 mg Oral Every Mon, Wed, Fri    clopidogreL  75 mg Oral Daily    epoetin nina-ebpx (RETACRIT) injection  50 Units/kg Intravenous Every Tues, Thurs, Sat    heparin (porcine)  5,000 Units Subcutaneous Q8H    hydrALAZINE  10 mg Oral Q12H    isosorbide dinitrate  10 mg Oral BID    mupirocin   Nasal BID    pregabalin  75 mg Oral Daily    sertraline  100 mg Oral Daily    sevelamer carbonate  800 mg Oral TID WM    sodium chloride 0.9%  3 mL Intravenous Q6H     PRN Meds:  Current Facility-Administered Medications:     acetaminophen, 1,000 mg, Oral, Q8H PRN    dextrose 10%, 12.5 g, Intravenous, PRN    dextrose 10%, 12.5 g, Intravenous, PRN    dextrose 10%, 12.5 g, Intravenous, PRN    dextrose 10%, 25 g, Intravenous, PRN    dextrose 10%, 25 g, Intravenous, PRN    dextrose 10%, 25 g, Intravenous, PRN    glucagon (human recombinant), 1 mg, Intramuscular, PRN    glucose, 16 g, Oral, PRN    glucose, 24 g, Oral, PRN    heparin (porcine), 1,000 Units, Intra-Catheter, PRN    insulin aspart U-100, 0-5 Units, Subcutaneous, QID (AC + HS) PRN    melatonin, 6 mg, Oral, Nightly PRN    morphine, 2 mg,  Intravenous, Q4H PRN    naloxone, 0.02 mg, Intravenous, PRN    oxyCODONE-acetaminophen, 1 tablet, Oral, Q8H PRN    sodium chloride 0.9%, 10 mL, Intravenous, Q12H PRN    Pharmacy to dose Vancomycin consult, , , Once **AND** vancomycin - pharmacy to dose, , Intravenous, pharmacy to manage frequency     Objective:     Vital Signs (Most Recent):  Temp: 97 °F (36.1 °C) (11/23/24 0440)  Pulse: 89 (11/23/24 0815)  Resp: 16 (11/23/24 0829)  BP: (!) 113/53 (11/23/24 0815)  SpO2: (!) 94 % (11/23/24 0440) Vital Signs (24h Range):  Temp:  [97 °F (36.1 °C)-98.5 °F (36.9 °C)] 97 °F (36.1 °C)  Pulse:  [76-93] 89  Resp:  [13-18] 16  SpO2:  [94 %-100 %] 94 %  BP: ()/(49-78) 113/53          Physical Exam  Constitutional:       Appearance: She is ill-appearing.   HENT:      Head: Normocephalic.   Eyes:      Pupils: Pupils are equal, round, and reactive to light.   Cardiovascular:      Rate and Rhythm: Normal rate.      Comments: Nonpalpable femoral pulses bilaterally.   Pulmonary:      Effort: Pulmonary effort is normal.   Musculoskeletal:      Left lower leg: Edema present.      Comments: Bilateral AKA   Skin:     General: Skin is warm and dry.      Comments: L AKA wound vac in place with adequate seal   Neurological:      General: No focal deficit present.      Mental Status: She is alert. Mental status is at baseline.          Significant Labs:  All pertinent labs from the last 24 hours have been reviewed.    Significant Diagnostics:  I have reviewed all pertinent imaging results/findings within the past 24 hours.  Assessment/Plan:     Peripheral vascular disease  60yo female whose PMH includes bilateral AKA, L 2021 s/p thrombosed bypass, R 2022 ESRD, DM, HTN, HLD presents with purulence from L stump wound.  She was discharged from this facility 11.12.24 with oral abx or LLE cellulitis after bring admitted for septic shock. She did receive IV abx during that admission and on dishcharge was transitioned to oral augmentin and  doxy and she went to SNF.  She was sent back to the ED for continued purulence and LLE pain. Now s/p LLE angio and debridement 11/22    - Patient seen and examined at bedside  - Wound vac with adequate seal, continue to monitor output  - No groin hematoma at access site appreciated  - Will need wound vac after discharge  - Rest of care per primary          Juan Jose Dudley MD  Vascular Surgery  [facility]

## 2024-11-23 NOTE — PLAN OF CARE
Problem: Adult Inpatient Plan of Care  Goal: Plan of Care Review  Outcome: Progressing  Goal: Patient-Specific Goal (Individualized)  Outcome: Progressing  Goal: Absence of Hospital-Acquired Illness or Injury  Outcome: Progressing  Goal: Optimal Comfort and Wellbeing  Outcome: Progressing  Goal: Readiness for Transition of Care  Outcome: Progressing     Problem: Bariatric Environmental Safety  Goal: Safety Maintained with Care  Outcome: Progressing     Problem: Infection  Goal: Absence of Infection Signs and Symptoms  Outcome: Progressing     Problem: Wound  Goal: Optimal Coping  Outcome: Progressing  Goal: Optimal Functional Ability  Outcome: Progressing  Goal: Absence of Infection Signs and Symptoms  Outcome: Progressing  Goal: Improved Oral Intake  Outcome: Progressing  Goal: Optimal Pain Control and Function  Outcome: Progressing  Goal: Skin Health and Integrity  Outcome: Progressing  Goal: Optimal Wound Healing  Outcome: Progressing     Problem: Sepsis/Septic Shock  Goal: Optimal Coping  Outcome: Progressing  Goal: Absence of Bleeding  Outcome: Progressing  Goal: Blood Glucose Level Within Targeted Range  Outcome: Progressing  Goal: Absence of Infection Signs and Symptoms  Outcome: Progressing  Goal: Optimal Nutrition Intake  Outcome: Progressing     Problem: Skin Injury Risk Increased  Goal: Skin Health and Integrity  Outcome: Progressing     Problem: Diabetes Comorbidity  Goal: Blood Glucose Level Within Targeted Range  Outcome: Progressing     Problem: Fall Injury Risk  Goal: Absence of Fall and Fall-Related Injury  Outcome: Progressing     Problem: Hemodialysis  Goal: Safe, Effective Therapy Delivery  Outcome: Progressing  Goal: Effective Tissue Perfusion  Outcome: Progressing  Goal: Absence of Infection Signs and Symptoms  Outcome: Progressing     Problem: Chronic Kidney Disease  Goal: Electrolyte Balance  Outcome: Progressing  Goal: Fluid Balance  Outcome: Progressing    AAOx4, antibiotics given, pain  medication administered, patient went to dialysis 2 liters removed per nurse,  at bedside, bed in lowest position, locked, side rails up x 2, and call light within reach.

## 2024-11-23 NOTE — SUBJECTIVE & OBJECTIVE
Interval History:  Pt seen and examined in PACU following surgery.  Wakes easily to voice but somnolent from anesthesia and falls quickly back to sleep.    Labs personally reviewed:  WBC 8.25, Hb 7.3, Na 127, Cr 2.6.    Vascular surgery consulted: OR today    Objective:     Vital Signs (Most Recent):  Temp: 97.5 °F (36.4 °C) (11/22/24 1929)  Pulse: 80 (11/22/24 1929)  Resp: 18 (11/22/24 1929)  BP: (!) 106/53 (11/22/24 1929)  SpO2: (!) 94 % (11/22/24 1929) Vital Signs (24h Range):  Temp:  [97.5 °F (36.4 °C)-99.2 °F (37.3 °C)] 97.5 °F (36.4 °C)  Pulse:  [76-86] 80  Resp:  [13-18] 18  SpO2:  [94 %-100 %] 94 %  BP: ()/(50-78) 106/53     Weight: 71.2 kg (156 lb 15.5 oz)  Body mass index is 47.9 kg/m².    Intake/Output Summary (Last 24 hours) at 11/22/2024 2018  Last data filed at 11/22/2024 1659  Gross per 24 hour   Intake 75 ml   Output --   Net 75 ml         Physical Exam  Constitutional:       Appearance: She is ill-appearing (chronically).   Cardiovascular:      Rate and Rhythm: Normal rate and regular rhythm.   Pulmonary:      Effort: Pulmonary effort is normal. No respiratory distress.   Abdominal:      General: Abdomen is flat. Bowel sounds are normal.   Musculoskeletal:         General: Deformity (bilateral BKA) present.      Left lower leg: Edema (Wound VAC in place) present.   Skin:     Findings: No lesion.          Neurological:      General: No focal deficit present.      Mental Status: She is alert.      Comments: Somnolent   Psychiatric:      Comments: Unable to assess d/t anesthesia

## 2024-11-24 PROBLEM — D64.9 POSTOPERATIVE ANEMIA: Status: ACTIVE | Noted: 2024-11-24

## 2024-11-24 LAB
ALBUMIN SERPL BCP-MCNC: 1.7 G/DL (ref 3.5–5.2)
ANION GAP SERPL CALC-SCNC: 9 MMOL/L (ref 8–16)
BLD PROD TYP BPU: NORMAL
BLOOD UNIT EXPIRATION DATE: NORMAL
BLOOD UNIT TYPE CODE: 5100
BLOOD UNIT TYPE: NORMAL
BUN SERPL-MCNC: 15 MG/DL (ref 6–20)
CALCIUM SERPL-MCNC: 8.2 MG/DL (ref 8.7–10.5)
CHLORIDE SERPL-SCNC: 95 MMOL/L (ref 95–110)
CO2 SERPL-SCNC: 24 MMOL/L (ref 23–29)
CODING SYSTEM: NORMAL
CREAT SERPL-MCNC: 2.6 MG/DL (ref 0.5–1.4)
CROSSMATCH INTERPRETATION: NORMAL
CRP SERPL-MCNC: 74.3 MG/L (ref 0–8.2)
DISPENSE STATUS: NORMAL
ERYTHROCYTE [DISTWIDTH] IN BLOOD BY AUTOMATED COUNT: 20 % (ref 11.5–14.5)
EST. GFR  (NO RACE VARIABLE): 20.7 ML/MIN/1.73 M^2
GLUCOSE SERPL-MCNC: 179 MG/DL (ref 70–110)
HCT VFR BLD AUTO: 20 % (ref 37–48.5)
HGB BLD-MCNC: 6.4 G/DL (ref 12–16)
HGB BLD-MCNC: 6.7 G/DL (ref 12–16)
MAGNESIUM SERPL-MCNC: 1.8 MG/DL (ref 1.6–2.6)
MCH RBC QN AUTO: 31.7 PG (ref 27–31)
MCHC RBC AUTO-ENTMCNC: 32 G/DL (ref 32–36)
MCV RBC AUTO: 99 FL (ref 82–98)
NUM UNITS TRANS PACKED RBC: NORMAL
PHOSPHATE SERPL-MCNC: 3.2 MG/DL (ref 2.7–4.5)
PLATELET # BLD AUTO: 305 K/UL (ref 150–450)
PMV BLD AUTO: 10.6 FL (ref 9.2–12.9)
POCT GLUCOSE: 135 MG/DL (ref 70–110)
POCT GLUCOSE: 151 MG/DL (ref 70–110)
POCT GLUCOSE: 171 MG/DL (ref 70–110)
POCT GLUCOSE: 213 MG/DL (ref 70–110)
POTASSIUM SERPL-SCNC: 3.7 MMOL/L (ref 3.5–5.1)
RBC # BLD AUTO: 2.02 M/UL (ref 4–5.4)
SODIUM SERPL-SCNC: 128 MMOL/L (ref 136–145)
WBC # BLD AUTO: 7.84 K/UL (ref 3.9–12.7)

## 2024-11-24 PROCEDURE — 25000003 PHARM REV CODE 250: Mod: HCNC | Performed by: ANESTHESIOLOGY

## 2024-11-24 PROCEDURE — 80069 RENAL FUNCTION PANEL: CPT | Mod: HCNC

## 2024-11-24 PROCEDURE — 25000003 PHARM REV CODE 250: Mod: HCNC

## 2024-11-24 PROCEDURE — P9016 RBC LEUKOCYTES REDUCED: HCPCS | Mod: HCNC | Performed by: FAMILY MEDICINE

## 2024-11-24 PROCEDURE — 25000003 PHARM REV CODE 250: Mod: HCNC | Performed by: STUDENT IN AN ORGANIZED HEALTH CARE EDUCATION/TRAINING PROGRAM

## 2024-11-24 PROCEDURE — 36415 COLL VENOUS BLD VENIPUNCTURE: CPT | Mod: HCNC | Performed by: FAMILY MEDICINE

## 2024-11-24 PROCEDURE — A4216 STERILE WATER/SALINE, 10 ML: HCPCS | Mod: HCNC | Performed by: ANESTHESIOLOGY

## 2024-11-24 PROCEDURE — 25000003 PHARM REV CODE 250: Mod: HCNC | Performed by: NURSE PRACTITIONER

## 2024-11-24 PROCEDURE — 63600175 PHARM REV CODE 636 W HCPCS: Mod: HCNC

## 2024-11-24 PROCEDURE — 85027 COMPLETE CBC AUTOMATED: CPT | Mod: HCNC

## 2024-11-24 PROCEDURE — 25000003 PHARM REV CODE 250: Mod: HCNC | Performed by: FAMILY MEDICINE

## 2024-11-24 PROCEDURE — 83735 ASSAY OF MAGNESIUM: CPT | Mod: HCNC

## 2024-11-24 PROCEDURE — 86140 C-REACTIVE PROTEIN: CPT | Mod: HCNC

## 2024-11-24 PROCEDURE — 21400001 HC TELEMETRY ROOM: Mod: HCNC

## 2024-11-24 PROCEDURE — 36415 COLL VENOUS BLD VENIPUNCTURE: CPT | Mod: HCNC

## 2024-11-24 PROCEDURE — 85018 HEMOGLOBIN: CPT | Mod: HCNC | Performed by: FAMILY MEDICINE

## 2024-11-24 PROCEDURE — 63600175 PHARM REV CODE 636 W HCPCS: Mod: HCNC | Performed by: FAMILY MEDICINE

## 2024-11-24 PROCEDURE — 86922 COMPATIBILITY TEST ANTIGLOB: CPT | Mod: HCNC | Performed by: FAMILY MEDICINE

## 2024-11-24 PROCEDURE — 99232 SBSQ HOSP IP/OBS MODERATE 35: CPT | Mod: HCNC,,, | Performed by: PHYSICIAN ASSISTANT

## 2024-11-24 RX ORDER — HYDROCODONE BITARTRATE AND ACETAMINOPHEN 500; 5 MG/1; MG/1
TABLET ORAL
Status: DISCONTINUED | OUTPATIENT
Start: 2024-11-24 | End: 2024-11-27

## 2024-11-24 RX ADMIN — CALCITRIOL 0.5 MCG: 0.5 CAPSULE, LIQUID FILLED ORAL at 07:11

## 2024-11-24 RX ADMIN — ATORVASTATIN CALCIUM 80 MG: 40 TABLET, FILM COATED ORAL at 07:11

## 2024-11-24 RX ADMIN — HEPARIN SODIUM 5000 UNITS: 5000 INJECTION INTRAVENOUS; SUBCUTANEOUS at 09:11

## 2024-11-24 RX ADMIN — HYDROMORPHONE HYDROCHLORIDE 0.5 MG: 1 INJECTION, SOLUTION INTRAMUSCULAR; INTRAVENOUS; SUBCUTANEOUS at 09:11

## 2024-11-24 RX ADMIN — SEVELAMER CARBONATE 800 MG: 800 TABLET, FILM COATED ORAL at 07:11

## 2024-11-24 RX ADMIN — CLOPIDOGREL BISULFATE 75 MG: 75 TABLET ORAL at 07:11

## 2024-11-24 RX ADMIN — CEFEPIME 1 G: 1 INJECTION, POWDER, FOR SOLUTION INTRAMUSCULAR; INTRAVENOUS at 12:11

## 2024-11-24 RX ADMIN — HYDROMORPHONE HYDROCHLORIDE 0.5 MG: 1 INJECTION, SOLUTION INTRAMUSCULAR; INTRAVENOUS; SUBCUTANEOUS at 06:11

## 2024-11-24 RX ADMIN — SEVELAMER CARBONATE 800 MG: 800 TABLET, FILM COATED ORAL at 05:11

## 2024-11-24 RX ADMIN — ASPIRIN 81 MG CHEWABLE TABLET 81 MG: 81 TABLET CHEWABLE at 07:11

## 2024-11-24 RX ADMIN — HYDRALAZINE HYDROCHLORIDE 10 MG: 10 TABLET ORAL at 09:11

## 2024-11-24 RX ADMIN — Medication 6 MG: at 09:11

## 2024-11-24 RX ADMIN — Medication 3 ML: at 05:11

## 2024-11-24 RX ADMIN — CARVEDILOL 3.12 MG: 3.12 TABLET, FILM COATED ORAL at 09:11

## 2024-11-24 RX ADMIN — HYDROMORPHONE HYDROCHLORIDE 0.5 MG: 1 INJECTION, SOLUTION INTRAMUSCULAR; INTRAVENOUS; SUBCUTANEOUS at 02:11

## 2024-11-24 RX ADMIN — INSULIN ASPART 2 UNITS: 100 INJECTION, SOLUTION INTRAVENOUS; SUBCUTANEOUS at 08:11

## 2024-11-24 RX ADMIN — HYDRALAZINE HYDROCHLORIDE 10 MG: 10 TABLET ORAL at 08:11

## 2024-11-24 RX ADMIN — HEPARIN SODIUM 5000 UNITS: 5000 INJECTION INTRAVENOUS; SUBCUTANEOUS at 06:11

## 2024-11-24 RX ADMIN — SEVELAMER CARBONATE 800 MG: 800 TABLET, FILM COATED ORAL at 12:11

## 2024-11-24 RX ADMIN — PREGABALIN 75 MG: 75 CAPSULE ORAL at 05:11

## 2024-11-24 RX ADMIN — ALLOPURINOL 50 MG: 300 TABLET ORAL at 08:11

## 2024-11-24 RX ADMIN — SERTRALINE HYDROCHLORIDE 100 MG: 50 TABLET ORAL at 07:11

## 2024-11-24 RX ADMIN — HEPARIN SODIUM 5000 UNITS: 5000 INJECTION INTRAVENOUS; SUBCUTANEOUS at 02:11

## 2024-11-24 RX ADMIN — Medication 3 ML: at 12:11

## 2024-11-24 RX ADMIN — CARVEDILOL 3.12 MG: 3.12 TABLET, FILM COATED ORAL at 08:11

## 2024-11-24 RX ADMIN — OXYCODONE HYDROCHLORIDE AND ACETAMINOPHEN 1 TABLET: 5; 325 TABLET ORAL at 09:11

## 2024-11-24 RX ADMIN — ISOSORBIDE DINITRATE 10 MG: 10 TABLET ORAL at 08:11

## 2024-11-24 RX ADMIN — ISOSORBIDE DINITRATE 10 MG: 10 TABLET ORAL at 09:11

## 2024-11-24 NOTE — ASSESSMENT & PLAN NOTE
Cont eliquis once cleared by vascular surgery  Holding eliquis until 11/25 per discussion w/ vasc. Resume 11/25

## 2024-11-24 NOTE — ASSESSMENT & PLAN NOTE
"58 y/o female PMHx: bilateral BKA, ESRD on HD, DM, HTN, HLD presents with purulence from L stump wound. Recently admitted and received IV abx during that admission and on discharge was transitioned to oral augmentin and doxy and she went to SNF.  She was sent back to the ED on 11/15 for continued purulence and LLE pain.  She has been admitted to hospital medicine for further management.    ID consulted for "antibiotic management"    Blood cultures from 11/15 NGTD  With status post angioplasty and left thigh wound debridement 11/22/24 - no cultures sent    Recommendations / Plan  Continue empiric vanc and Cefepime 1 g q24 hrs - can be given post HD upon DC  Recommend wound care  Messaged vascular surgery the wound culture can be taken at next VAC change if purulent seen  May need to treat empirically x2 weeks then reimage to assess for fluid seen on MRI and if resolved    -- ID will continue to follow for further recs    "

## 2024-11-24 NOTE — SUBJECTIVE & OBJECTIVE
Interval History:   No acute events overnight  Afebrile and WBC WNL  Blood cultures finalized negative  No new wound cultures the debrided on 11/22  Complain of left leg pain  Denies fever, chills, sweats      Review of Systems   Constitutional:  Negative for activity change, chills, diaphoresis and fever.   Respiratory:  Negative for cough, shortness of breath and wheezing.    Cardiovascular:  Negative for chest pain.   Gastrointestinal:  Negative for abdominal pain, constipation, diarrhea, nausea and vomiting.   Genitourinary:  Negative for dysuria, frequency and urgency.   Musculoskeletal:  Positive for myalgias.   Skin:  Positive for wound.   Neurological:  Negative for dizziness.   Hematological:  Does not bruise/bleed easily.     Objective:     Vital Signs (Most Recent):  Temp: 96.2 °F (35.7 °C) (11/24/24 1413)  Pulse: 74 (11/24/24 1507)  Resp: 16 (11/24/24 1418)  BP: 110/77 (11/24/24 1413)  SpO2: 98 % (11/24/24 1413) Vital Signs (24h Range):  Temp:  [96 °F (35.6 °C)-98.8 °F (37.1 °C)] 96.2 °F (35.7 °C)  Pulse:  [69-93] 74  Resp:  [16-18] 16  SpO2:  [91 %-98 %] 98 %  BP: (104-134)/(47-77) 110/77     Weight: 71.2 kg (156 lb 15.5 oz)  Body mass index is 47.9 kg/m².    Estimated Creatinine Clearance: 17.8 mL/min (A) (based on SCr of 2.6 mg/dL (H)).     Physical Exam  Vitals and nursing note reviewed.   Constitutional:       General: She is not in acute distress.     Appearance: Normal appearance. She is not ill-appearing.       HENT:      Head: Normocephalic and atraumatic.      Nose: Nose normal.   Cardiovascular:      Rate and Rhythm: Normal rate.   Pulmonary:      Effort: Pulmonary effort is normal. No respiratory distress.   Musculoskeletal:      Right Lower Extremity: Right leg is amputated above knee.      Left Lower Extremity: Left leg is amputated above knee.   Skin:     Findings: No lesion or rash.   Neurological:      Mental Status: She is alert and oriented to person, place, and time.   Psychiatric:     "     Mood and Affect: Mood normal.          Significant Labs: Blood Culture:   Recent Labs   Lab 06/12/24  0949 06/12/24  0954 10/29/24  1045 11/15/24  1543   LABBLOO No growth after 5 days. No growth after 5 days. No growth after 5 days.  No growth after 5 days. No growth after 5 days.  No growth after 5 days.     CBC:   Recent Labs   Lab 11/23/24  0650 11/23/24  1436 11/24/24  0654 11/24/24  1058   WBC 9.20  --  7.84  --    HGB 6.1* 7.7* 6.4* 6.7*   HCT 18.9*  --  20.0*  --      --  305  --      CMP:   Recent Labs   Lab 11/23/24  0650 11/23/24  1436 11/24/24  0654   * 131* 128*   K 4.9 3.7 3.7   CL 97 97 95   CO2 21* 25 24   GLU 71 130* 179*   BUN 22* 10 15   CREATININE 3.3* 1.8* 2.6*   CALCIUM 8.2* 8.5* 8.2*   ALBUMIN 1.6*  --  1.7*   ANIONGAP 10 9 9     Wound Culture: No results for input(s): "LABAERO" in the last 4320 hours.  All pertinent labs within the past 24 hours have been reviewed.    Significant Imaging:   Procedure Component Value Units Date/Time   VAS Ankle Brachial Indices Resting [[id number]] Collected: 11/20/24 1325   Order Status: Completed Updated: 11/22/24 0832   Narrative:       Indication  ========    -Peripheral Vascular Disease, Non Healing Left Wound    History  ======    General History  Other: -Hx of Tyrell AKA    Pressure Lower  ============    Rt brachial A syst BP  162 mmHg    Impression  =========    Right Leg: Above knee amputation. Thigh PVR waveform suggest moderate peripheral arterial occlusive disease.    Left Leg: Above knee amputation. Thigh PVR waveform suggest mild peripheral arterial occlusive disease.      DATE OF SERVICE: 11/20/2024                                                      Sonographer: Dinesh Ontiveros  Electronically Signed by: Zbigniew Florez II at 11/22/2024-08:32   CTA Runoff ABD PEL Bilat Lower Ext [[id number]] Resulted: 11/20/24 0758   Order Status: Completed Updated: 11/20/24 0801   Narrative:     EXAMINATION:  CTA RUNOFF ABD PEL BILAT " LOWER EXT    CLINICAL HISTORY:  nonhealing wound;    TECHNIQUE:  Prior to as well as following the intravenous administration of 100 cc Omnipaque 350 CTA of the abdomen pelvis and bilateral lower extremities was performed.  Axial sagittal and coronal reformatted images as well as MIP and 3D images submitted.    COMPARISON:  CT abdomen pelvis June 25, 2024 and CTA left lower extremity September 30, 2024    FINDINGS:  In the chest, small bilateral pleural effusions noted.  Significant cardiomegaly.  There is some fluid in the fissures.  Lungs are hypoaerated.  Motion artifact noted.  Presume linear bands of atelectasis.  No confluent consolidation.    In the abdomen, liver is enlarged 18.8 cm.  Significant reflux of contrast into the hepatic veins suggest elevated right heart pressure.  No convincing hepatic mass.  Gallbladder mildly distended.  Few mm stones layering in the gallbladder.  Pancreas is somewhat atrophic.  No convincing pancreatic mass.  Spleen not enlarged.  Heterogeneous enhancement likely contrast bolus timing.  No adrenal masses.  Some atrophic change in the kidneys though no convincing mass.  No convincing para-aortic adenopathy.    Likewise in the pelvis, no convincing pelvic adenopathy.  2.1 cm fluid density left groin similar.  Bladder not distended.  Numerous calcifications about the enlarged uterus presumably fibroids.    Evaluation of the bowel demonstrates scattered stool and bowel gas.  No focal dilatation.  Portal vein not yet opacified.  No convincing adenopathy    Evaluation of the aorta demonstrates extensive diffuse calcified plaque throughout the visualized vascular system.  Celiac and SMA are patent.  Circumferential plaque noted extending into the SMA.  Bilateral renal arteries are patent though again extensive plaque.  Accessory left renal artery noted.  NIDA appears patent.  Aorta is patent to the bifurcation.    Significant plaque extends into the right common iliac with at least  60-80% diameter narrowing.  Plaque extends into the internal and external iliac artery with diffuse circumferential plaque resulting in 70-90% diameter narrowing of the external iliac.  Diffuse plaque extends into the right common femoral artery to the bifurcation.  Extensive plaque in the right SFA with high-grade stenosis multifocal.  Plaque circumferential in the profundus femoral branches.  Diffuse plaque in the right SFA with focal areas of short segment occlusion.  Multiple collateral vessels are noted though the majority of the visualized SFA is occluded.    On the left, there is occlusion of the proximal left common iliac artery.  There is reconstitution more distally though again diffuse circumferential severe plaque involves both the internal and external iliac arteries.  External iliac artery is patent with diffuse plaque approximately 60-80% diameter narrowing.  Left common femoral is patent.  Multiple calcifications at the bifurcation.  Occluded left SFA bypass graft.  Native left SFA also is occluded.  Severe plaque extends into the profundus femoral branches.  Only collateral vessels are identified in the left thigh.  Diffuse calcified plaque in the occluded left SFA noted.    Soft tissues demonstrate diffuse anasarca.  There appears to be a wound at the left AKA stump medially.  Some air in the soft tissues medially as well.  Anasarca extends into the abdominopelvic wall.  Small fat containing umbilical hernia.    Evaluation of the bones demonstrate degenerative changes.  Bilateral AKA noted.  No convincing bony destruction.   Impression:       Extensive calcified plaque throughout the visualized vascular system with details as above.    Bilateral AKA.  There is significant soft tissue stranding bilaterally left greater than right consistent with a history of cellulitis.  There also appears to be a skin defect in the medial soft tissues about the stump with some air.    Significant cardiomegaly with  reflux of contrast into the hepatic veins suggesting elevated right heart pressure.  Hepatomegaly.    Small bilateral effusions.    Cholelithiasis.    Atrophic changes in the kidneys.    Additional findings above.      Electronically signed by: Abram Carvalho MD  Date: 11/20/2024  Time: 07:58   MRI Femur WO Contrast LT [[id number]] Resulted: 11/19/24 1433   Order Status: Completed Updated: 11/19/24 1435   Narrative:     EXAMINATION:  MRI FEMUR WO CONTRAST LT    CLINICAL HISTORY:  osteo;    TECHNIQUE:  MRI of the left femur without intravenous contrast.    COMPARISON:  CT thigh 11/13/2024    FINDINGS:  Images are degraded by susceptibility artifact and aliasing.    BONE: Postoperative changes from above the knee amputation.  No bone marrow signal changes or erosions to suggest osteomyelitis.  No fracture or osteonecrosis.    JOINT: No hip joint effusion.    SOFT TISSUE: Diffuse muscle atrophy with corresponding edema like signal.  Diffuse subcutaneous and intermuscular edema.  There is a 3.5 x 3.5 x 1.6 cm fluid collection at the distal femoral stump (4:70).  There are 2 fluid collections in the left groin measuring up to 2.4 cm.  There is a soft tissue defect in the left medial thigh measuring approximately 7.5 x 5.0 cm.    MISCELLANEOUS: No inguinal lymphadenopathy.  The vascular graft in the left anterior thigh.   Impression:       Postoperative changes from above the knee amputation.    3.5 cm fluid collection at the femoral stump, which could represent a seroma, hematoma, or abscess.  No evidence of osteomyelitis.    Two fluid collections in the left groin measuring up to 2.4 cm.  Differential considerations include seroma, hematoma, abscess, and fluid containing inguinal hernia.    Diffuse subcutaneous and intermuscular edema, which could be inflammatory or infectious.    Diffuse muscle atrophy with corresponding edema like signal, which could represent denervation or myositis.      Electronically signed by:  Vannaun Echeverria  Date: 11/19/2024  Time: 14:33      Imaging History     2024    Date Procedure Name Study Review Link PACS Link Status Accession Number Location   11/23/24 07:15 AM Cardiac monitoring strips Study Review  Final     11/20/24 01:25 PM VAS Ankle Brachial Indices Resting Study Review  Final [id number]    11/19/24 05:34 PM Cardiac monitoring strips Study Review  Final     11/19/24 04:28 PM CTA Runoff ABD PEL Bilat Lower Ext Study Review  Images Final [id number] [facility]   11/19/24 02:24 PM Cardiac monitoring strips Study Review  Final     11/19/24 02:12 PM MRI Femur WO Contrast LT Study Review  Images Final [id number] [facility]   11/19/24 12:47 PM Cardiac monitoring strips Study Review  Final     11/17/24 09:35 AM Cardiac monitoring strips Study Review  Final     11/17/24 09:35 AM Cardiac monitoring strips Study Review  Final     11/16/24 02:22 AM MRI Limited Non-Billable Study Review  Images Final [id number] [facility]   11/15/24 04:01 PM X-Ray Chest AP Portable Study Review  Images Final [id number] [facility]   11/14/24 06:47 AM CT Head Without Contrast Study Review  Images Final [id number] [facility]   11/13/24 10:00 PM CT Thigh Without Contrast Left Study Review  Images Final [id number] [facility]   11/13/24 09:43 PM X-Ray Femur 2 View Left Study Review  Images Final [id number] [facility]   10/29/24 04:17 PM US Abdomen Limited Study Review  Images Final [id number] [facility]   10/29/24 12:18 PM Echo Saline Bubble? No; Ultrasound enhancing contrast? No Study Review  Images Final [id number] [facility]   10/29/24 12:10 PM X-Ray Chest 1 View Study Review  Images Final [id number] [facility]   10/28/24 03:21 PM CT Thigh Without Contrast Left Study Review  Images Final [id number] [facility]   10/28/24 12:00 AM Cardiac monitoring strips Study Review  Final     10/28/24 12:00 AM Cardiac monitoring strips Study Review  Final     10/28/24 12:00 AM Cardiac monitoring strips Study Review  Final

## 2024-11-24 NOTE — PROGRESS NOTES
"[facility] Medicine  Progress Note    Patient Name: Suyapa Connelly  MRN: [id number]  Patient Class: IP- Inpatient   Admission Date: 11/15/2024  Length of Stay: 7 days  Attending Physician: Dimitris Rojo III*  Primary Care Provider: Vanessa Noel MD        Subjective:     Principal Problem:Cellulitis of left thigh        HPI:  58yo female whose PMH includes bilateral BKA, ESRD, DM, HTN, HLD presents with purulence from L stump wound.  She was discharged from this facility 11.12.24 with oral abx or LLE cellulitis after bring admitted for septic shock.  During that admission general surgery was consulted for further evaluation d/t concerns for nec fasc as there was subcutaneous emphysema noted on CT; after their clinical assessment nec fasc was ruled out.  She did receive IV abx during that admission and on discharge was transitioned to oral augmentin and doxy and she went to SNF.  She was sent back to the ED for continued purulence and LLE pain.  Surgery was consulted in the ED and have no plans for intervention at this time.  MR is pending.  White count is normal.  She has been admitted to medicine for further management.    Overview/Hospital Course:  Per prior attending physician, Dr. Traore:  "Admitted for LLE cellulitis; failed outpatient oral abx.  Discharged from this facility 11.12.24 with oral abx or LLE cellulitis after bring admitted for septic shock; on discharge was transitioned to oral augmentin and doxy and she went to SNF.  Sent back to the ED for continued purulence and LLE pain.  Surgery was consulted in the ED and have no plans for intervention at this time.  Repeat CT here showed postop change of bilateral above knee amputation, no destructive osseous process to suggest osteomyelitis in the remaining portions of the left femur or in the visualized proximal portion of the cut right femur, skin defect along the medial aspect of the left thigh with foci of air " "underlying the wound, additional area of soft tissue contour indentation and focal skin thickening along the anterior thigh, suggestive of possible infectious or inflammatory process versus region of scarring, additional extensive soft tissue edema and skin thickening of the pelvis, partially visualized right lower extremity, and left lower extremity.  MR non diagnostic due to motion artifact.  White count is normal.  Receiving vanc/cefepime; ID consulted.  Repeat MR with sedation postoperative changes from above the knee amputation, 3.5 cm fluid collection at the femoral stump, which could represent a seroma, hematoma, or abscess; no evidence of osteomyelitis.  There are two fluid collections in the left groin measuring up to 2.4 cm, diffuse subcutaneous and intermuscular edema, which could be inflammatory or infectious, diffuse muscle atrophy with corresponding edema like signal, which could represent denervation or myositis.  CTA with runoff showed on the left, there is occlusion of the proximal left common iliac artery with reconstitution more distally though again diffuse circumferential severe plaque involves both the internal and external iliac arteries.  The external iliac artery is patent with diffuse plaque approximately 60-80% diameter narrowing.  The left common femoral is patent.  Multiple calcifications at the bifurcation.  Occluded left SFA bypass graft. Native left SFA also is occluded.  Severe plaque extends into the profundus femoral branches.  Only collateral vessels are identified in the left thigh.  Diffuse calcified plaque in the occluded left SFA noted.  Other findings noted in the result report.  Vascular surgery consulted; tentative plan for intervention 11.22.24."    OR 11/22 with vascular surgery:  Aortogram and BLE angiogram with left common iliac stent placement and left AKA stump debridement.    Interval History:  Pt reports postoperative LLE pain.  Denies any chest pain, SOB, abdominal " pain, nausea, vomiting.    Labs personally reviewed:  Hb 7.7, Na 131, K 3.7, Cr 1.8    Discussed with Nephrology: HD today      Objective:     Vital Signs (Most Recent):  Temp: 97.5 °F (36.4 °C) (11/23/24 1936)  Pulse: 88 (11/23/24 1936)  Resp: 18 (11/23/24 1936)  BP: 107/64 (11/23/24 1936)  SpO2: 95 % (11/23/24 1936) Vital Signs (24h Range):  Temp:  [97 °F (36.1 °C)-98.8 °F (37.1 °C)] 97.5 °F (36.4 °C)  Pulse:  [81-94] 88  Resp:  [16-18] 18  SpO2:  [94 %-97 %] 95 %  BP: ()/(49-83) 107/64     Weight: 71.2 kg (156 lb 15.5 oz)  Body mass index is 47.9 kg/m².    Intake/Output Summary (Last 24 hours) at 11/23/2024 2003  Last data filed at 11/23/2024 1122  Gross per 24 hour   Intake 702.5 ml   Output 3150 ml   Net -2447.5 ml         Physical Exam  Vitals reviewed.   Constitutional:       Appearance: She is ill-appearing. She is not toxic-appearing.   HENT:      Head: Normocephalic and atraumatic.   Cardiovascular:      Rate and Rhythm: Normal rate and regular rhythm.      Heart sounds: Normal heart sounds. No murmur heard.  Pulmonary:      Effort: Pulmonary effort is normal.      Breath sounds: Normal breath sounds.   Abdominal:      General: Bowel sounds are normal. There is no distension.      Palpations: Abdomen is soft.      Tenderness: There is no abdominal tenderness.   Musculoskeletal:         General: No deformity. Normal range of motion.      Comments: Bilateral BKA.  LLE wound VAC in place   Skin:     General: Skin is warm and dry.   Neurological:      General: No focal deficit present.      Mental Status: She is alert and oriented to person, place, and time.   Psychiatric:         Mood and Affect: Mood normal.         Behavior: Behavior normal.         Assessment/Plan:      * Cellulitis of left thigh  Id consulted:  Cont vanc/cefepime  Blood cultures NGTD  Vascular surgery consulted, surgical intervention 11/22.  Follow culture results.  Pain control    Severe obesity (BMI >= 40)  Body mass index is 47.9  kg/m².  Would benefit from dietary and lifestyle modifications in relation to health  Consider dietary/nutrition consult outpatient    ESRD (end stage renal disease)  Consult nephro for HD management. HD TThS    Paroxysmal atrial fibrillation  Cont eliquis once cleared by vascular surgery  Holding eliquis until 11/25 per discussion w/ vasc. Resume 11/25    Anemia due to chronic kidney disease, on chronic dialysis  Close monitoring  No indication for transfusion at this time  Goal directed resuscitation, transfuse for Hgb<7  1 u prbc transfused 11/23    CAD (coronary artery disease)  Cont plavix, eliquis  Cont statin    Type 2 diabetes mellitus with hyperglycemia, with long-term current use of insulin  SSI  Diabetic diet when not NPO  ACHS glucose checks    CAROLIN (generalized anxiety disorder)  Cont zoloft    Peripheral vascular disease  Cont statin  D/w vasc, hold eliquis until 11/25, cont asa/plav  See plan for cellulitis      VTE Risk Mitigation (From admission, onward)           Ordered     heparin (porcine) injection 5,000 Units  Every 8 hours         11/20/24 1622     IP VTE HIGH RISK PATIENT  Once         11/20/24 1622     heparin (porcine) injection 1,000 Units  As needed (PRN)         11/16/24 1159     Place sequential compression device  Until discontinued         11/15/24 1648     Reason for No Pharmacological VTE Prophylaxis  Once        Question:  Reasons:  Answer:  Already adequately anticoagulated on oral Anticoagulants    11/15/24 1648                    Discharge Planning   DEVAN: 11/24/2024     Code Status: Full Code   Is the patient medically ready for discharge?:     Reason for patient still in hospital (select all that apply): Patient trending condition, Laboratory test, Treatment, Imaging, Consult recommendations, and PT / OT recommendations  Discharge Plan A: Return to nursing home                  Dimitris Rojo III, MD  Department of Hospital Medicine   [facility]

## 2024-11-24 NOTE — CONSULTS
[facility] unable to see patient due to high patient volume.     If unable to obtain IV access, re consult [facility] in the am.

## 2024-11-24 NOTE — PROGRESS NOTES
"[facility] Surg  Infectious Disease  Progress Note    Patient Name: Suyapa Connelly  MRN: [id number]  Admission Date: 11/15/2024  Length of Stay: 8 days  Attending Physician: Dimitris Rojo III*  Primary Care Provider: Vanessa Noel MD    Isolation Status: No active isolations  Assessment/Plan:      ID  * Cellulitis of left thigh  60 y/o female PMHx: bilateral BKA, ESRD on HD, DM, HTN, HLD presents with purulence from L stump wound. Recently admitted and received IV abx during that admission and on discharge was transitioned to oral augmentin and doxy and she went to SNF.  She was sent back to the ED on 11/15 for continued purulence and LLE pain.  She has been admitted to hospital medicine for further management.    ID consulted for "antibiotic management"    Blood cultures from 11/15 NGTD  With status post angioplasty and left thigh wound debridement 11/22/24 - no cultures sent    Recommendations / Plan  Continue empiric vanc and Cefepime 1 g q24 hrs - can be given post HD upon DC  Recommend wound care  Messaged vascular surgery the wound culture can be taken at next VAC change if purulent seen  May need to treat empirically x2 weeks then reimage to assess for fluid seen on MRI and if resolved    -- ID will continue to follow for further recs          Anticipated Disposition: tbd    Thank you for your consult. I will follow-up with patient. Please contact us if you have any additional questions.    VENANCIO Green  Infectious Disease  [facility] Surg    Subjective:     Principal Problem:Cellulitis of left thigh    HPI: 60 y/o female PMHx: bilateral BKA, ESRD on HD, DM, HTN, HLD presents with purulence from L stump wound.  She was discharged from this facility 11/12/24 with oral abx for LLE cellulitis after bring admitted for septic shock.  During that admission there were concerns for nec fasc as there was subcutaneous emphysema noted on CT, however, nec fasc was ruled out.  She did " receive IV abx during that admission and on dishcharge was transitioned to oral augmentin and doxy and she went to SNF.  She was sent back to the ED for continued purulence and LLE pain. She has been admitted to hospital medicine for further management.      -- ID will continue to follow for further recs    Interval History:   No acute events overnight  Afebrile and WBC WNL  Blood cultures finalized negative  No new wound cultures the debrided on 11/22  Complain of left leg pain  Denies fever, chills, sweats      Review of Systems   Constitutional:  Negative for activity change, chills, diaphoresis and fever.   Respiratory:  Negative for cough, shortness of breath and wheezing.    Cardiovascular:  Negative for chest pain.   Gastrointestinal:  Negative for abdominal pain, constipation, diarrhea, nausea and vomiting.   Genitourinary:  Negative for dysuria, frequency and urgency.   Musculoskeletal:  Positive for myalgias.   Skin:  Positive for wound.   Neurological:  Negative for dizziness.   Hematological:  Does not bruise/bleed easily.     Objective:     Vital Signs (Most Recent):  Temp: 96.2 °F (35.7 °C) (11/24/24 1413)  Pulse: 74 (11/24/24 1507)  Resp: 16 (11/24/24 1418)  BP: 110/77 (11/24/24 1413)  SpO2: 98 % (11/24/24 1413) Vital Signs (24h Range):  Temp:  [96 °F (35.6 °C)-98.8 °F (37.1 °C)] 96.2 °F (35.7 °C)  Pulse:  [69-93] 74  Resp:  [16-18] 16  SpO2:  [91 %-98 %] 98 %  BP: (104-134)/(47-77) 110/77     Weight: 71.2 kg (156 lb 15.5 oz)  Body mass index is 47.9 kg/m².    Estimated Creatinine Clearance: 17.8 mL/min (A) (based on SCr of 2.6 mg/dL (H)).     Physical Exam  Vitals and nursing note reviewed.   Constitutional:       General: She is not in acute distress.     Appearance: Normal appearance. She is not ill-appearing.       HENT:      Head: Normocephalic and atraumatic.      Nose: Nose normal.   Cardiovascular:      Rate and Rhythm: Normal rate.   Pulmonary:      Effort: Pulmonary effort is normal. No  "respiratory distress.   Musculoskeletal:      Right Lower Extremity: Right leg is amputated above knee.      Left Lower Extremity: Left leg is amputated above knee.   Skin:     Findings: No lesion or rash.   Neurological:      Mental Status: She is alert and oriented to person, place, and time.   Psychiatric:         Mood and Affect: Mood normal.          Significant Labs: Blood Culture:   Recent Labs   Lab 06/12/24  0949 06/12/24  0954 10/29/24  1045 11/15/24  1543   LABBLOO No growth after 5 days. No growth after 5 days. No growth after 5 days.  No growth after 5 days. No growth after 5 days.  No growth after 5 days.     CBC:   Recent Labs   Lab 11/23/24  0650 11/23/24  1436 11/24/24  0654 11/24/24  1058   WBC 9.20  --  7.84  --    HGB 6.1* 7.7* 6.4* 6.7*   HCT 18.9*  --  20.0*  --      --  305  --      CMP:   Recent Labs   Lab 11/23/24  0650 11/23/24  1436 11/24/24  0654   * 131* 128*   K 4.9 3.7 3.7   CL 97 97 95   CO2 21* 25 24   GLU 71 130* 179*   BUN 22* 10 15   CREATININE 3.3* 1.8* 2.6*   CALCIUM 8.2* 8.5* 8.2*   ALBUMIN 1.6*  --  1.7*   ANIONGAP 10 9 9     Wound Culture: No results for input(s): "LABAERO" in the last 4320 hours.  All pertinent labs within the past 24 hours have been reviewed.    Significant Imaging:   Procedure Component Value Units Date/Time   VAS Ankle Brachial Indices Resting [[id number]] Collected: 11/20/24 1325   Order Status: Completed Updated: 11/22/24 0832   Narrative:       Indication  ========    -Peripheral Vascular Disease, Non Healing Left Wound    History  ======    General History  Other: -Hx of Tyrell AKA    Pressure Lower  ============    Rt brachial A syst BP  162 mmHg    Impression  =========    Right Leg: Above knee amputation. Thigh PVR waveform suggest moderate peripheral arterial occlusive disease.    Left Leg: Above knee amputation. Thigh PVR waveform suggest mild peripheral arterial occlusive disease.      DATE OF SERVICE: 11/20/2024                      "                                 Sonographer: Dinesh Ontiveros  Electronically Signed by: Zbigniew Prakashannika MANN at 11/22/2024-08:32   CTA Runoff ABD PEL Bilat Lower Ext [[id number]] Resulted: 11/20/24 0758   Order Status: Completed Updated: 11/20/24 0801   Narrative:     EXAMINATION:  CTA RUNOFF ABD PEL BILAT LOWER EXT    CLINICAL HISTORY:  nonhealing wound;    TECHNIQUE:  Prior to as well as following the intravenous administration of 100 cc Omnipaque 350 CTA of the abdomen pelvis and bilateral lower extremities was performed.  Axial sagittal and coronal reformatted images as well as MIP and 3D images submitted.    COMPARISON:  CT abdomen pelvis June 25, 2024 and CTA left lower extremity September 30, 2024    FINDINGS:  In the chest, small bilateral pleural effusions noted.  Significant cardiomegaly.  There is some fluid in the fissures.  Lungs are hypoaerated.  Motion artifact noted.  Presume linear bands of atelectasis.  No confluent consolidation.    In the abdomen, liver is enlarged 18.8 cm.  Significant reflux of contrast into the hepatic veins suggest elevated right heart pressure.  No convincing hepatic mass.  Gallbladder mildly distended.  Few mm stones layering in the gallbladder.  Pancreas is somewhat atrophic.  No convincing pancreatic mass.  Spleen not enlarged.  Heterogeneous enhancement likely contrast bolus timing.  No adrenal masses.  Some atrophic change in the kidneys though no convincing mass.  No convincing para-aortic adenopathy.    Likewise in the pelvis, no convincing pelvic adenopathy.  2.1 cm fluid density left groin similar.  Bladder not distended.  Numerous calcifications about the enlarged uterus presumably fibroids.    Evaluation of the bowel demonstrates scattered stool and bowel gas.  No focal dilatation.  Portal vein not yet opacified.  No convincing adenopathy    Evaluation of the aorta demonstrates extensive diffuse calcified plaque throughout the visualized vascular system.  Celiac  and SMA are patent.  Circumferential plaque noted extending into the SMA.  Bilateral renal arteries are patent though again extensive plaque.  Accessory left renal artery noted.  NIDA appears patent.  Aorta is patent to the bifurcation.    Significant plaque extends into the right common iliac with at least 60-80% diameter narrowing.  Plaque extends into the internal and external iliac artery with diffuse circumferential plaque resulting in 70-90% diameter narrowing of the external iliac.  Diffuse plaque extends into the right common femoral artery to the bifurcation.  Extensive plaque in the right SFA with high-grade stenosis multifocal.  Plaque circumferential in the profundus femoral branches.  Diffuse plaque in the right SFA with focal areas of short segment occlusion.  Multiple collateral vessels are noted though the majority of the visualized SFA is occluded.    On the left, there is occlusion of the proximal left common iliac artery.  There is reconstitution more distally though again diffuse circumferential severe plaque involves both the internal and external iliac arteries.  External iliac artery is patent with diffuse plaque approximately 60-80% diameter narrowing.  Left common femoral is patent.  Multiple calcifications at the bifurcation.  Occluded left SFA bypass graft.  Native left SFA also is occluded.  Severe plaque extends into the profundus femoral branches.  Only collateral vessels are identified in the left thigh.  Diffuse calcified plaque in the occluded left SFA noted.    Soft tissues demonstrate diffuse anasarca.  There appears to be a wound at the left AKA stump medially.  Some air in the soft tissues medially as well.  Anasarca extends into the abdominopelvic wall.  Small fat containing umbilical hernia.    Evaluation of the bones demonstrate degenerative changes.  Bilateral AKA noted.  No convincing bony destruction.   Impression:       Extensive calcified plaque throughout the visualized  vascular system with details as above.    Bilateral AKA.  There is significant soft tissue stranding bilaterally left greater than right consistent with a history of cellulitis.  There also appears to be a skin defect in the medial soft tissues about the stump with some air.    Significant cardiomegaly with reflux of contrast into the hepatic veins suggesting elevated right heart pressure.  Hepatomegaly.    Small bilateral effusions.    Cholelithiasis.    Atrophic changes in the kidneys.    Additional findings above.      Electronically signed by: Abram Carvalho MD  Date: 11/20/2024  Time: 07:58   MRI Femur WO Contrast LT [[id number]] Resulted: 11/19/24 1433   Order Status: Completed Updated: 11/19/24 1435   Narrative:     EXAMINATION:  MRI FEMUR WO CONTRAST LT    CLINICAL HISTORY:  osteo;    TECHNIQUE:  MRI of the left femur without intravenous contrast.    COMPARISON:  CT thigh 11/13/2024    FINDINGS:  Images are degraded by susceptibility artifact and aliasing.    BONE: Postoperative changes from above the knee amputation.  No bone marrow signal changes or erosions to suggest osteomyelitis.  No fracture or osteonecrosis.    JOINT: No hip joint effusion.    SOFT TISSUE: Diffuse muscle atrophy with corresponding edema like signal.  Diffuse subcutaneous and intermuscular edema.  There is a 3.5 x 3.5 x 1.6 cm fluid collection at the distal femoral stump (4:70).  There are 2 fluid collections in the left groin measuring up to 2.4 cm.  There is a soft tissue defect in the left medial thigh measuring approximately 7.5 x 5.0 cm.    MISCELLANEOUS: No inguinal lymphadenopathy.  The vascular graft in the left anterior thigh.   Impression:       Postoperative changes from above the knee amputation.    3.5 cm fluid collection at the femoral stump, which could represent a seroma, hematoma, or abscess.  No evidence of osteomyelitis.    Two fluid collections in the left groin measuring up to 2.4 cm.  Differential considerations  include seroma, hematoma, abscess, and fluid containing inguinal hernia.    Diffuse subcutaneous and intermuscular edema, which could be inflammatory or infectious.    Diffuse muscle atrophy with corresponding edema like signal, which could represent denervation or myositis.      Electronically signed by: Van Echeverria  Date: 11/19/2024  Time: 14:33      Imaging History     2024    Date Procedure Name Study Review Link PACS Link Status Accession Number Location   11/23/24 07:15 AM Cardiac monitoring strips Study Review  Final     11/20/24 01:25 PM VAS Ankle Brachial Indices Resting Study Review  Final [id number]    11/19/24 05:34 PM Cardiac monitoring strips Study Review  Final     11/19/24 04:28 PM CTA Runoff ABD PEL Bilat Lower Ext Study Review  Images Final [id number] [facility]   11/19/24 02:24 PM Cardiac monitoring strips Study Review  Final     11/19/24 02:12 PM MRI Femur WO Contrast LT Study Review  Images Final [id number] [facility]   11/19/24 12:47 PM Cardiac monitoring strips Study Review  Final     11/17/24 09:35 AM Cardiac monitoring strips Study Review  Final     11/17/24 09:35 AM Cardiac monitoring strips Study Review  Final     11/16/24 02:22 AM MRI Limited Non-Billable Study Review  Images Final [id number] [facility]   11/15/24 04:01 PM X-Ray Chest AP Portable Study Review  Images Final [id number] [facility]   11/14/24 06:47 AM CT Head Without Contrast Study Review  Images Final [id number] [facility]   11/13/24 10:00 PM CT Thigh Without Contrast Left Study Review  Images Final [id number] [facility]   11/13/24 09:43 PM X-Ray Femur 2 View Left Study Review  Images Final [id number] [facility]   10/29/24 04:17 PM US Abdomen Limited Study Review  Images Final [id number] [facility]   10/29/24 12:18 PM Echo Saline Bubble? No; Ultrasound enhancing contrast? No Study Review  Images Final [id number] [facility]   10/29/24 12:10 PM X-Ray Chest 1 View Study Review  Images Final [id number] [facility]   10/28/24 03:21 PM CT Thigh Without Contrast Left Study Review  Images Final  [id number] [facility]   10/28/24 12:00 AM Cardiac monitoring strips Study Review  Final     10/28/24 12:00 AM Cardiac monitoring strips Study Review  Final     10/28/24 12:00 AM Cardiac monitoring strips Study Review  Final

## 2024-11-24 NOTE — ASSESSMENT & PLAN NOTE
Postoperative anemia  Goal directed resuscitation, transfuse for Hgb<7  2 units PRBCs transfused total:  11/23 and 11/24  Asymptomatic

## 2024-11-24 NOTE — SUBJECTIVE & OBJECTIVE
Interval History:  Pt reports postoperative LLE pain.  Denies any chest pain, SOB, abdominal pain, nausea, vomiting.    Labs personally reviewed:  Hb 7.7, Na 131, K 3.7, Cr 1.8    Discussed with Nephrology: HD today      Objective:     Vital Signs (Most Recent):  Temp: 97.5 °F (36.4 °C) (11/23/24 1936)  Pulse: 88 (11/23/24 1936)  Resp: 18 (11/23/24 1936)  BP: 107/64 (11/23/24 1936)  SpO2: 95 % (11/23/24 1936) Vital Signs (24h Range):  Temp:  [97 °F (36.1 °C)-98.8 °F (37.1 °C)] 97.5 °F (36.4 °C)  Pulse:  [81-94] 88  Resp:  [16-18] 18  SpO2:  [94 %-97 %] 95 %  BP: ()/(49-83) 107/64     Weight: 71.2 kg (156 lb 15.5 oz)  Body mass index is 47.9 kg/m².    Intake/Output Summary (Last 24 hours) at 11/23/2024 2003  Last data filed at 11/23/2024 1122  Gross per 24 hour   Intake 702.5 ml   Output 3150 ml   Net -2447.5 ml         Physical Exam  Vitals reviewed.   Constitutional:       Appearance: She is ill-appearing. She is not toxic-appearing.   HENT:      Head: Normocephalic and atraumatic.   Cardiovascular:      Rate and Rhythm: Normal rate and regular rhythm.      Heart sounds: Normal heart sounds. No murmur heard.  Pulmonary:      Effort: Pulmonary effort is normal.      Breath sounds: Normal breath sounds.   Abdominal:      General: Bowel sounds are normal. There is no distension.      Palpations: Abdomen is soft.      Tenderness: There is no abdominal tenderness.   Musculoskeletal:         General: No deformity. Normal range of motion.      Comments: Bilateral BKA.  LLE wound VAC in place   Skin:     General: Skin is warm and dry.   Neurological:      General: No focal deficit present.      Mental Status: She is alert and oriented to person, place, and time.   Psychiatric:         Mood and Affect: Mood normal.         Behavior: Behavior normal.

## 2024-11-24 NOTE — PLAN OF CARE
Problem: Adult Inpatient Plan of Care  Goal: Plan of Care Review  Outcome: Progressing  Goal: Patient-Specific Goal (Individualized)  Outcome: Progressing  Goal: Absence of Hospital-Acquired Illness or Injury  Outcome: Progressing  Goal: Optimal Comfort and Wellbeing  Outcome: Progressing  Goal: Readiness for Transition of Care  Outcome: Progressing     Problem: Bariatric Environmental Safety  Goal: Safety Maintained with Care  Outcome: Progressing     Problem: Infection  Goal: Absence of Infection Signs and Symptoms  Outcome: Progressing     Problem: Wound  Goal: Optimal Coping  Outcome: Progressing  Goal: Optimal Functional Ability  Outcome: Progressing  Goal: Absence of Infection Signs and Symptoms  Outcome: Progressing  Goal: Improved Oral Intake  Outcome: Progressing  Goal: Optimal Pain Control and Function  Outcome: Progressing  Goal: Skin Health and Integrity  Outcome: Progressing  Goal: Optimal Wound Healing  Outcome: Progressing     Problem: Sepsis/Septic Shock  Goal: Optimal Coping  Outcome: Progressing  Goal: Absence of Bleeding  Outcome: Progressing  Goal: Blood Glucose Level Within Targeted Range  Outcome: Progressing  Goal: Absence of Infection Signs and Symptoms  Outcome: Progressing  Goal: Optimal Nutrition Intake  Outcome: Progressing     Problem: Skin Injury Risk Increased  Goal: Skin Health and Integrity  Outcome: Progressing     Problem: Diabetes Comorbidity  Goal: Blood Glucose Level Within Targeted Range  Outcome: Progressing     Problem: Hemodialysis  Goal: Safe, Effective Therapy Delivery  Outcome: Progressing  Goal: Effective Tissue Perfusion  Outcome: Progressing  Goal: Absence of Infection Signs and Symptoms  Outcome: Progressing

## 2024-11-25 LAB
ALBUMIN SERPL BCP-MCNC: 1.9 G/DL (ref 3.5–5.2)
ANION GAP SERPL CALC-SCNC: 10 MMOL/L (ref 8–16)
BASOPHILS # BLD AUTO: 0.07 K/UL (ref 0–0.2)
BASOPHILS NFR BLD: 0.9 % (ref 0–1.9)
BUN SERPL-MCNC: 19 MG/DL (ref 6–20)
CALCIUM SERPL-MCNC: 8.6 MG/DL (ref 8.7–10.5)
CHLORIDE SERPL-SCNC: 93 MMOL/L (ref 95–110)
CO2 SERPL-SCNC: 23 MMOL/L (ref 23–29)
CREAT SERPL-MCNC: 3.3 MG/DL (ref 0.5–1.4)
DIFFERENTIAL METHOD BLD: ABNORMAL
EOSINOPHIL # BLD AUTO: 0.4 K/UL (ref 0–0.5)
EOSINOPHIL NFR BLD: 5.4 % (ref 0–8)
ERYTHROCYTE [DISTWIDTH] IN BLOOD BY AUTOMATED COUNT: 21 % (ref 11.5–14.5)
EST. GFR  (NO RACE VARIABLE): 15.6 ML/MIN/1.73 M^2
GLUCOSE SERPL-MCNC: 119 MG/DL (ref 70–110)
HCT VFR BLD AUTO: 24.3 % (ref 37–48.5)
HGB BLD-MCNC: 7.7 G/DL (ref 12–16)
IMM GRANULOCYTES # BLD AUTO: 0.03 K/UL (ref 0–0.04)
IMM GRANULOCYTES NFR BLD AUTO: 0.4 % (ref 0–0.5)
LYMPHOCYTES # BLD AUTO: 0.7 K/UL (ref 1–4.8)
LYMPHOCYTES NFR BLD: 9.6 % (ref 18–48)
MAGNESIUM SERPL-MCNC: 1.8 MG/DL (ref 1.6–2.6)
MCH RBC QN AUTO: 29.7 PG (ref 27–31)
MCHC RBC AUTO-ENTMCNC: 31.7 G/DL (ref 32–36)
MCV RBC AUTO: 94 FL (ref 82–98)
MONOCYTES # BLD AUTO: 1.1 K/UL (ref 0.3–1)
MONOCYTES NFR BLD: 14.5 % (ref 4–15)
NEUTROPHILS # BLD AUTO: 5.3 K/UL (ref 1.8–7.7)
NEUTROPHILS NFR BLD: 69.2 % (ref 38–73)
NRBC BLD-RTO: 0 /100 WBC
PHOSPHATE SERPL-MCNC: 3.6 MG/DL (ref 2.7–4.5)
PLATELET # BLD AUTO: 300 K/UL (ref 150–450)
PMV BLD AUTO: 10.4 FL (ref 9.2–12.9)
POCT GLUCOSE: 147 MG/DL (ref 70–110)
POCT GLUCOSE: 158 MG/DL (ref 70–110)
POCT GLUCOSE: 191 MG/DL (ref 70–110)
POTASSIUM SERPL-SCNC: 4 MMOL/L (ref 3.5–5.1)
RBC # BLD AUTO: 2.59 M/UL (ref 4–5.4)
SODIUM SERPL-SCNC: 126 MMOL/L (ref 136–145)
WBC # BLD AUTO: 7.6 K/UL (ref 3.9–12.7)

## 2024-11-25 PROCEDURE — 80069 RENAL FUNCTION PANEL: CPT | Mod: HCNC | Performed by: FAMILY MEDICINE

## 2024-11-25 PROCEDURE — 63600175 PHARM REV CODE 636 W HCPCS: Mod: HCNC | Performed by: FAMILY MEDICINE

## 2024-11-25 PROCEDURE — 99232 SBSQ HOSP IP/OBS MODERATE 35: CPT | Mod: HCNC,GC,,

## 2024-11-25 PROCEDURE — 83735 ASSAY OF MAGNESIUM: CPT | Mod: HCNC | Performed by: FAMILY MEDICINE

## 2024-11-25 PROCEDURE — 25000003 PHARM REV CODE 250: Mod: HCNC | Performed by: NURSE PRACTITIONER

## 2024-11-25 PROCEDURE — 25000003 PHARM REV CODE 250: Mod: HCNC | Performed by: FAMILY MEDICINE

## 2024-11-25 PROCEDURE — 63600175 PHARM REV CODE 636 W HCPCS: Mod: HCNC

## 2024-11-25 PROCEDURE — A4216 STERILE WATER/SALINE, 10 ML: HCPCS | Mod: HCNC | Performed by: ANESTHESIOLOGY

## 2024-11-25 PROCEDURE — 25000003 PHARM REV CODE 250: Mod: HCNC

## 2024-11-25 PROCEDURE — 21400001 HC TELEMETRY ROOM: Mod: HCNC

## 2024-11-25 PROCEDURE — 25000003 PHARM REV CODE 250: Mod: HCNC | Performed by: ANESTHESIOLOGY

## 2024-11-25 PROCEDURE — 36415 COLL VENOUS BLD VENIPUNCTURE: CPT | Mod: HCNC | Performed by: FAMILY MEDICINE

## 2024-11-25 PROCEDURE — A4216 STERILE WATER/SALINE, 10 ML: HCPCS | Mod: HCNC

## 2024-11-25 PROCEDURE — 85025 COMPLETE CBC W/AUTO DIFF WBC: CPT | Mod: HCNC | Performed by: FAMILY MEDICINE

## 2024-11-25 RX ORDER — TALC
6 POWDER (GRAM) TOPICAL NIGHTLY
Status: DISCONTINUED | OUTPATIENT
Start: 2024-11-25 | End: 2024-12-05 | Stop reason: HOSPADM

## 2024-11-25 RX ORDER — BALSAM PERU/CASTOR OIL
OINTMENT (GRAM) TOPICAL 2 TIMES DAILY
Status: DISCONTINUED | OUTPATIENT
Start: 2024-11-25 | End: 2024-12-05 | Stop reason: HOSPADM

## 2024-11-25 RX ORDER — CEFEPIME HYDROCHLORIDE 1 G/1
1 INJECTION, POWDER, FOR SOLUTION INTRAMUSCULAR; INTRAVENOUS
Status: DISCONTINUED | OUTPATIENT
Start: 2024-11-25 | End: 2024-12-01

## 2024-11-25 RX ORDER — OXYCODONE AND ACETAMINOPHEN 10; 325 MG/1; MG/1
1 TABLET ORAL EVERY 4 HOURS PRN
Status: DISCONTINUED | OUTPATIENT
Start: 2024-11-25 | End: 2024-12-05 | Stop reason: HOSPADM

## 2024-11-25 RX ADMIN — SEVELAMER CARBONATE 800 MG: 800 TABLET, FILM COATED ORAL at 04:11

## 2024-11-25 RX ADMIN — CLOPIDOGREL BISULFATE 75 MG: 75 TABLET ORAL at 10:11

## 2024-11-25 RX ADMIN — SEVELAMER CARBONATE 800 MG: 800 TABLET, FILM COATED ORAL at 12:11

## 2024-11-25 RX ADMIN — ISOSORBIDE DINITRATE 10 MG: 10 TABLET ORAL at 10:11

## 2024-11-25 RX ADMIN — Medication 10 ML: at 05:11

## 2024-11-25 RX ADMIN — HEPARIN SODIUM 5000 UNITS: 5000 INJECTION INTRAVENOUS; SUBCUTANEOUS at 05:11

## 2024-11-25 RX ADMIN — PREGABALIN 75 MG: 75 CAPSULE ORAL at 04:11

## 2024-11-25 RX ADMIN — APIXABAN 5 MG: 5 TABLET, FILM COATED ORAL at 08:11

## 2024-11-25 RX ADMIN — OXYCODONE AND ACETAMINOPHEN 1 TABLET: 10; 325 TABLET ORAL at 10:11

## 2024-11-25 RX ADMIN — CALCITRIOL 0.5 MCG: 0.5 CAPSULE, LIQUID FILLED ORAL at 10:11

## 2024-11-25 RX ADMIN — SERTRALINE HYDROCHLORIDE 100 MG: 50 TABLET ORAL at 10:11

## 2024-11-25 RX ADMIN — CARVEDILOL 3.12 MG: 3.12 TABLET, FILM COATED ORAL at 10:11

## 2024-11-25 RX ADMIN — HYDRALAZINE HYDROCHLORIDE 10 MG: 10 TABLET ORAL at 08:11

## 2024-11-25 RX ADMIN — Medication 3 ML: at 08:11

## 2024-11-25 RX ADMIN — HYDRALAZINE HYDROCHLORIDE 10 MG: 10 TABLET ORAL at 10:11

## 2024-11-25 RX ADMIN — APIXABAN 5 MG: 5 TABLET, FILM COATED ORAL at 10:11

## 2024-11-25 RX ADMIN — CEFEPIME 1 G: 1 INJECTION, POWDER, FOR SOLUTION INTRAMUSCULAR; INTRAVENOUS at 01:11

## 2024-11-25 RX ADMIN — ATORVASTATIN CALCIUM 80 MG: 40 TABLET, FILM COATED ORAL at 10:11

## 2024-11-25 RX ADMIN — Medication 10 ML: at 06:11

## 2024-11-25 RX ADMIN — Medication 3 ML: at 06:11

## 2024-11-25 RX ADMIN — Medication 3 ML: at 01:11

## 2024-11-25 RX ADMIN — Medication: at 08:11

## 2024-11-25 RX ADMIN — MICONAZOLE NITRATE: 20 OINTMENT TOPICAL at 08:11

## 2024-11-25 RX ADMIN — OXYCODONE AND ACETAMINOPHEN 1 TABLET: 10; 325 TABLET ORAL at 08:11

## 2024-11-25 RX ADMIN — Medication 6 MG: at 08:11

## 2024-11-25 RX ADMIN — SEVELAMER CARBONATE 800 MG: 800 TABLET, FILM COATED ORAL at 10:11

## 2024-11-25 RX ADMIN — CARVEDILOL 3.12 MG: 3.12 TABLET, FILM COATED ORAL at 08:11

## 2024-11-25 RX ADMIN — CINACALCET HYDROCHLORIDE 30 MG: 30 TABLET, FILM COATED ORAL at 04:11

## 2024-11-25 RX ADMIN — Medication 10 ML: at 01:11

## 2024-11-25 RX ADMIN — ISOSORBIDE DINITRATE 10 MG: 10 TABLET ORAL at 08:11

## 2024-11-25 NOTE — PROGRESS NOTES
Pharmacist Renal Dose Adjustment Note    Suyapa Connelly is a 59 y.o. female being treated with the medication cefepime    Patient Data:    Vital Signs (Most Recent):  Temp: 97.6 °F (36.4 °C) (11/25/24 0753)  Pulse: 73 (11/25/24 0753)  Resp: 16 (11/25/24 0753)  BP: 137/81 (11/25/24 0753)  SpO2: 96 % (11/25/24 0753) Vital Signs (72h Range):  Temp:  [96 °F (35.6 °C)-98.8 °F (37.1 °C)]   Pulse:  [69-94]   Resp:  [13-18]   BP: ()/(47-89)   SpO2:  [91 %-100 %]      Recent Labs   Lab 11/23/24  1436 11/24/24  0654 11/25/24  0506   CREATININE 1.8* 2.6* 3.3*     Serum creatinine: 3.3 mg/dL (H) 11/25/24 0506  Estimated creatinine clearance: 14.1 mL/min (A)    Cefepime 1g Q12H will be changed to cefepime 1g Q24H    Pharmacist's Name: Neftali Barber  Pharmacist's Extension: [phone]

## 2024-11-25 NOTE — PROGRESS NOTES
[facility] Medicine  Progress Note    Patient Name: Syuapa Connelly  MRN: [id number]  Patient Class: IP- Inpatient   Admission Date: 11/15/2024  Length of Stay: 8 days  Attending Physician: Dimitris Rojo III*  Primary Care Provider: Vanessa Noel MD        Subjective:     Principal Problem:Cellulitis of left thigh        HPI:  Per admitting physician (11/15):  60yo female whose PMH includes bilateral BKA, ESRD, DM, HTN, HLD presents with purulence from L stump wound.  She was discharged from this facility 11.12.24 with oral abx or LLE cellulitis after bring admitted for septic shock.  During that admission general surgery was consulted for further evaluation d/t concerns for nec fasc as there was subcutaneous emphysema noted on CT; after their clinical assessment nec fasc was ruled out.  She did receive IV abx during that admission and on discharge was transitioned to oral augmentin and doxy and she went to SNF.  She was sent back to the ED for continued purulence and LLE pain.  Surgery was consulted in the ED and have no plans for intervention at this time.  MR is pending.  White count is normal.  She has been admitted to medicine for further management.    Overview/Hospital Course:  Per prior attending physician, Dr. Traore:  Admitted for LLE cellulitis; failed outpatient oral abx.  Discharged from this facility 11.12.24 with oral abx or LLE cellulitis after bring admitted for septic shock; on discharge was transitioned to oral augmentin and doxy and she went to SNF.  Sent back to the ED for continued purulence and LLE pain.  Surgery was consulted in the ED and have no plans for intervention at this time.  Repeat CT here showed postop change of bilateral above knee amputation, no destructive osseous process to suggest osteomyelitis in the remaining portions of the left femur or in the visualized proximal portion of the cut right femur, skin defect along the medial aspect of the  left thigh with foci of air underlying the wound, additional area of soft tissue contour indentation and focal skin thickening along the anterior thigh, suggestive of possible infectious or inflammatory process versus region of scarring, additional extensive soft tissue edema and skin thickening of the pelvis, partially visualized right lower extremity, and left lower extremity.  MR non diagnostic due to motion artifact.  White count is normal.  Receiving vanc/cefepime; ID consulted.  Repeat MR with sedation postoperative changes from above the knee amputation, 3.5 cm fluid collection at the femoral stump, which could represent a seroma, hematoma, or abscess; no evidence of osteomyelitis.  There are two fluid collections in the left groin measuring up to 2.4 cm, diffuse subcutaneous and intermuscular edema, which could be inflammatory or infectious, diffuse muscle atrophy with corresponding edema like signal, which could represent denervation or myositis.  CTA with runoff showed on the left, there is occlusion of the proximal left common iliac artery with reconstitution more distally though again diffuse circumferential severe plaque involves both the internal and external iliac arteries.  The external iliac artery is patent with diffuse plaque approximately 60-80% diameter narrowing.  The left common femoral is patent.  Multiple calcifications at the bifurcation.  Occluded left SFA bypass graft. Native left SFA also is occluded.  Severe plaque extends into the profundus femoral branches.  Only collateral vessels are identified in the left thigh.  Diffuse calcified plaque in the occluded left SFA noted.  Other findings noted in the result report.  Vascular surgery consulted; tentative plan for intervention 11.22.24.    OR 11/22 with vascular surgery:  Aortogram and BLE angiogram with left common iliac stent placement and left AKA stump debridement.  Postoperative anemia requiring 2 units PRBCs 11/23 and 11/24.  BM  reported by  at bedside, brown stool.  Urine saturated pad noted by , no blood.  Contacted vascular surgery morning of 11/24 to evaluate left upper leg, no concern for ongoing bleeding.    Interval History:  Pt reports continued LLE postoperative pain.  No worse than yesterday.  Reported BM and urine saturating pad within last 24 hours.   at bedside, no black or bloody stool and no hematuria.  Denies any headache, dizziness, chest pain, SOB, palpitations, flank pain    Labs personally reviewed:  Hb 6.4, platelets 305, Na 128, Cr 2.6, albumin 1.7    Discussed with ID:  Cont vancomycin and cefepime.  Recommend cultures during wound VAC change 11/25.    Discussed with vascular surgery: Evaluated left upper leg, no concern for ongoing bleeding at this time.    Objective:     Vital Signs (Most Recent):  Temp: 96.4 °F (35.8 °C) (11/24/24 1545)  Pulse: 82 (11/24/24 1842)  Resp: 16 (11/24/24 1825)  BP: 122/60 (11/24/24 1545)  SpO2: 98 % (11/24/24 1545) Vital Signs (24h Range):  Temp:  [96 °F (35.6 °C)-98.6 °F (37 °C)] 96.4 °F (35.8 °C)  Pulse:  [69-87] 82  Resp:  [16-18] 16  SpO2:  [91 %-98 %] 98 %  BP: (104-134)/(47-77) 122/60     Weight: 71.2 kg (156 lb 15.5 oz)  Body mass index is 47.9 kg/m².    Intake/Output Summary (Last 24 hours) at 11/24/2024 2046  Last data filed at 11/24/2024 1928  Gross per 24 hour   Intake 704.17 ml   Output 1 ml   Net 703.17 ml         Physical Exam  Vitals reviewed.   Constitutional:       Appearance: She is ill-appearing. She is not toxic-appearing.   HENT:      Head: Normocephalic and atraumatic.   Cardiovascular:      Rate and Rhythm: Normal rate and regular rhythm.      Heart sounds: Normal heart sounds. No murmur heard.  Pulmonary:      Effort: Pulmonary effort is normal.      Breath sounds: Normal breath sounds.   Abdominal:      General: Bowel sounds are normal. There is no distension.      Palpations: Abdomen is soft.      Tenderness: There is no abdominal  tenderness.      Comments: No flank pain, no obvious flank bruising.   Musculoskeletal:         General: No deformity. Normal range of motion.      Comments: Bilateral BKA.  LLE wound VAC in place.  Edematous and tender to palpation.  No obvious bruising.   Skin:     General: Skin is warm and dry.   Neurological:      General: No focal deficit present.      Mental Status: She is alert and oriented to person, place, and time.   Psychiatric:         Mood and Affect: Mood normal.         Behavior: Behavior normal.         Assessment/Plan:      * Cellulitis of left thigh  Id consulted:  Cont vanc/cefepime.   Blood cultures NGTD  Vascular surgery consulted, surgical intervention 11/22.  Expected postop edema and pain  Pain control    Peripheral vascular disease  S/P Aortogram, bilateral lower extremity angiogram w/ L common iliac stent   Cont statin  D/w vasc, hold eliquis until 11/25, cont asa/plav  11/25 eliquis and plavix  See plan for cellulitis    Anemia due to chronic kidney disease, on chronic dialysis  Postoperative anemia  Goal directed resuscitation, transfuse for Hgb<7  2 units PRBCs transfused total:  11/23 and 11/24  Asymptomatic    Severe obesity (BMI >= 40)  Body mass index is 47.9 kg/m².  Would benefit from dietary and lifestyle modifications in relation to health  Consider dietary/nutrition consult outpatient    ESRD (end stage renal disease)  Consult nephro for HD management. HD TThS    Paroxysmal atrial fibrillation  Cont eliquis once cleared by vascular surgery  Holding eliquis until 11/25 per discussion w/ vasc. Resume 11/25    CAD (coronary artery disease)  Cont asa/plavix until 11/25 then resume eliquis/plavix  Cont statin    Type 2 diabetes mellitus with hyperglycemia, with long-term current use of insulin  SSI  Diabetic diet when not NPO  ACHS glucose checks    CAROLIN (generalized anxiety disorder)  Cont zoloft      VTE Risk Mitigation (From admission, onward)           Ordered     heparin (porcine)  injection 5,000 Units  Every 8 hours         11/20/24 1622     IP VTE HIGH RISK PATIENT  Once         11/20/24 1622     heparin (porcine) injection 1,000 Units  As needed (PRN)         11/16/24 1159     Place sequential compression device  Until discontinued         11/15/24 1648     Reason for No Pharmacological VTE Prophylaxis  Once        Question:  Reasons:  Answer:  Already adequately anticoagulated on oral Anticoagulants    11/15/24 1648                    Discharge Planning   DEVAN: 11/26/2024     Code Status: Full Code   Is the patient medically ready for discharge?:     Reason for patient still in hospital (select all that apply): Patient trending condition, Laboratory test, Treatment, Consult recommendations, PT / OT recommendations, and Pending disposition  Discharge Plan A: Return to nursing home          Dimitris Rojo III, MD  Department of Hospital Medicine   [facility]

## 2024-11-25 NOTE — SUBJECTIVE & OBJECTIVE
Interval History:  Pt reports continued LLE postoperative pain.  No worse than yesterday.  Reported BM and urine saturating pad within last 24 hours.   at bedside, no black or bloody stool and no hematuria.  Denies any headache, dizziness, chest pain, SOB, palpitations, flank pain    Labs personally reviewed:  Hb 6.4, platelets 305, Na 128, Cr 2.6, albumin 1.7    Discussed with ID:  Cont vancomycin and cefepime.  Recommend cultures during wound VAC change 11/25.    Discussed with vascular surgery: Evaluated left upper leg, no concern for ongoing bleeding at this time.    Objective:     Vital Signs (Most Recent):  Temp: 96.4 °F (35.8 °C) (11/24/24 1545)  Pulse: 82 (11/24/24 1842)  Resp: 16 (11/24/24 1825)  BP: 122/60 (11/24/24 1545)  SpO2: 98 % (11/24/24 1545) Vital Signs (24h Range):  Temp:  [96 °F (35.6 °C)-98.6 °F (37 °C)] 96.4 °F (35.8 °C)  Pulse:  [69-87] 82  Resp:  [16-18] 16  SpO2:  [91 %-98 %] 98 %  BP: (104-134)/(47-77) 122/60     Weight: 71.2 kg (156 lb 15.5 oz)  Body mass index is 47.9 kg/m².    Intake/Output Summary (Last 24 hours) at 11/24/2024 2046  Last data filed at 11/24/2024 1928  Gross per 24 hour   Intake 704.17 ml   Output 1 ml   Net 703.17 ml         Physical Exam  Vitals reviewed.   Constitutional:       Appearance: She is ill-appearing. She is not toxic-appearing.   HENT:      Head: Normocephalic and atraumatic.   Cardiovascular:      Rate and Rhythm: Normal rate and regular rhythm.      Heart sounds: Normal heart sounds. No murmur heard.  Pulmonary:      Effort: Pulmonary effort is normal.      Breath sounds: Normal breath sounds.   Abdominal:      General: Bowel sounds are normal. There is no distension.      Palpations: Abdomen is soft.      Tenderness: There is no abdominal tenderness.      Comments: No flank pain, no obvious flank bruising.   Musculoskeletal:         General: No deformity. Normal range of motion.      Comments: Bilateral BKA.  LLE wound VAC in place.  Edematous and  tender to palpation.  No obvious bruising.   Skin:     General: Skin is warm and dry.   Neurological:      General: No focal deficit present.      Mental Status: She is alert and oriented to person, place, and time.   Psychiatric:         Mood and Affect: Mood normal.         Behavior: Behavior normal.

## 2024-11-25 NOTE — ASSESSMENT & PLAN NOTE
S/P Aortogram, bilateral lower extremity angiogram w/ L common iliac stent   Cont statin  D/w vasc, resumed Eliquis with Plavix on 11/25  See plan for cellulitis

## 2024-11-25 NOTE — ASSESSMENT & PLAN NOTE
Id consulted:  Cont vanc/cefepime.   Blood cultures NGTD  Vascular surgery consulted, surgical intervention 11/22.  Expected postop edema and pain  Pain control

## 2024-11-25 NOTE — PLAN OF CARE

## 2024-11-25 NOTE — ASSESSMENT & PLAN NOTE
I have reviewed hospital notes from  HM service and other specialty providers. I have also reviewed CBC, CMP/BMP,  cultures and imaging with my interpretation as documented.      59F with h/o ESRD on HD, DM2, severe arterial vascular disease, status post CABG, s/p B/L AKA, c/b poor healing with recent prior admission at OSH for skin breakdown of L AKA site; treated with IV abx, transitioned to empiric oral doxy and augmentin. -- Pt now presents from SNF with purulence from L AKA stump wound. Index bld cxs NGTD. Pt underwent angio and left AKA site debridement 11/22/24, no cxs sent.  MRI Left femur showed 3.5 x 3.5 x 1.6 cm fluid collection at the distal femoral stump (4:70).  There are 2 fluid collections in the left groin measuring up to 2.4 cm.  There is a soft tissue defect in the left medial thigh measuring approximately 7.5 x 5.0 cm, with some air.      Recommendations / Plan:  Continue vanc and Cefepime 1 g q24 hrs post-HD x 14 days (HAILEY: 12/1/24)  F/u in ID clinic after therapy is complete  Recommend sending wound cultures if vascular suspects infection when changing out wound vac (currently no drainage in wound vac cannister)    -- Discussed with ID staff and primary team.  -- ID will sign off at this time, please re-consult with any issues

## 2024-11-25 NOTE — PROGRESS NOTES
[facility] - Med Surg  Infectious Disease  Progress Note    Patient Name: Suyapa Connelly  MRN: [id number]  Admission Date: 11/15/2024  Length of Stay: 9 days  Attending Physician: Dimitris Rojo III*  Primary Care Provider: Vanessa Noel MD    Isolation Status: No active isolations  Assessment/Plan:      ID  * Cellulitis of left thigh  I have reviewed hospital notes from  HM service and other specialty providers. I have also reviewed CBC, CMP/BMP,  cultures and imaging with my interpretation as documented.      59F with h/o ESRD on HD, DM2, severe arterial vascular disease, status post CABG, s/p B/L AKA, c/b poor healing with recent prior admission at OSH for skin breakdown of L AKA site; treated with IV abx, transitioned to empiric oral doxy and augmentin. -- Pt now presents from SNF with purulence from L AKA stump wound. Index bld cxs NGTD. Pt underwent angio and left AKA site debridement 11/22/24, no cxs sent.  MRI Left femur showed 3.5 x 3.5 x 1.6 cm fluid collection at the distal femoral stump (4:70).  There are 2 fluid collections in the left groin measuring up to 2.4 cm.  There is a soft tissue defect in the left medial thigh measuring approximately 7.5 x 5.0 cm, with some air.      Recommendations / Plan:  Continue vanc and Cefepime 1 g q24 hrs post-HD x 14 days (HAILEY: 12/1/24)  F/u in ID clinic after therapy is complete  Recommend sending wound cultures if vascular suspects infection when changing out wound vac (currently no drainage in wound vac cannister)    -- Discussed with ID staff and primary team.  -- ID will sign off at this time, please re-consult with any issues              Anticipated Disposition: Vanc and Cefepime HAILEY 12/1/24    Thank you for your consult. I will sign off. Please contact us if you have any additional questions.    ADRIAN Abdi  Infectious Disease  [facility] - Med Surg    Subjective:     Principal Problem:Cellulitis of left thigh    HPI: 60 y/o female PMHx:  bilateral AKA, ESRD on HD, DM, HTN, HLD presents with purulence from L stump wound.  She was discharged from this facility 11/12/24 with oral abx for LLE cellulitis after bring admitted for septic shock.  During that admission there were concerns for nec fasc as there was subcutaneous emphysema noted on CT, however, nec fasc was ruled out.  She did receive IV abx during that admission and on dishcharge was transitioned to oral augmentin and doxy and she went to SNF.  She was sent back to the ED for continued purulence and LLE pain. She has been admitted to hospital medicine for further management.      -- ID will continue to follow for further recs    Interval History:   No acute events overnight  Afebrile and WBC WNL  Blood cultures finalized negative  No new wound cultures the debrided on 11/22, plan to change woundvac on Wednesday 11/27  Complain of left leg pain  Denies fever, chills, sweats    Review of Systems  Objective:     Vital Signs (Most Recent):  Temp: 98.2 °F (36.8 °C) (11/25/24 1138)  Pulse: 77 (11/25/24 1138)  Resp: 16 (11/25/24 1138)  BP: 137/68 (11/25/24 1138)  SpO2: (!) 94 % (11/25/24 1138) Vital Signs (24h Range):  Temp:  [96.2 °F (35.7 °C)-98.2 °F (36.8 °C)] 98.2 °F (36.8 °C)  Pulse:  [73-82] 77  Resp:  [15-18] 16  SpO2:  [94 %-98 %] 94 %  BP: (110-137)/(60-89) 137/68     Weight: 71.2 kg (156 lb 15.5 oz)  Body mass index is 47.9 kg/m².    Estimated Creatinine Clearance: 14.1 mL/min (A) (based on SCr of 3.3 mg/dL (H)).     Physical Exam     Significant Labs: Blood Culture:   Recent Labs   Lab 06/12/24  0949 06/12/24  0954 10/29/24  1045 11/15/24  1543   LABBLOO No growth after 5 days. No growth after 5 days. No growth after 5 days.  No growth after 5 days. No growth after 5 days.  No growth after 5 days.     Wound Culture:  All pertinent labs within the past 24 hours have been reviewed.    Significant Imaging: I have reviewed all pertinent imaging results/findings within the past 24 hours.

## 2024-11-25 NOTE — PLAN OF CARE

## 2024-11-25 NOTE — SUBJECTIVE & OBJECTIVE
Interval History:   No acute events overnight  Afebrile and WBC WNL  Blood cultures finalized negative  No new wound cultures the debrided on 11/22, plan to change woundvac on Wednesday 11/27  Complain of left leg pain  Denies fever, chills, sweats    Review of Systems  Objective:     Vital Signs (Most Recent):  Temp: 98.2 °F (36.8 °C) (11/25/24 1138)  Pulse: 77 (11/25/24 1138)  Resp: 16 (11/25/24 1138)  BP: 137/68 (11/25/24 1138)  SpO2: (!) 94 % (11/25/24 1138) Vital Signs (24h Range):  Temp:  [96.2 °F (35.7 °C)-98.2 °F (36.8 °C)] 98.2 °F (36.8 °C)  Pulse:  [73-82] 77  Resp:  [15-18] 16  SpO2:  [94 %-98 %] 94 %  BP: (110-137)/(60-89) 137/68     Weight: 71.2 kg (156 lb 15.5 oz)  Body mass index is 47.9 kg/m².    Estimated Creatinine Clearance: 14.1 mL/min (A) (based on SCr of 3.3 mg/dL (H)).     Physical Exam     Significant Labs: Blood Culture:   Recent Labs   Lab 06/12/24  0949 06/12/24  0954 10/29/24  1045 11/15/24  1543   LABBLOO No growth after 5 days. No growth after 5 days. No growth after 5 days.  No growth after 5 days. No growth after 5 days.  No growth after 5 days.     Wound Culture:  All pertinent labs within the past 24 hours have been reviewed.    Significant Imaging: I have reviewed all pertinent imaging results/findings within the past 24 hours.

## 2024-11-26 LAB
ALBUMIN SERPL BCP-MCNC: 1.8 G/DL (ref 3.5–5.2)
ANION GAP SERPL CALC-SCNC: 10 MMOL/L (ref 8–16)
BASOPHILS # BLD AUTO: 0.07 K/UL (ref 0–0.2)
BASOPHILS NFR BLD: 0.9 % (ref 0–1.9)
BLD PROD TYP BPU: NORMAL
BLOOD UNIT EXPIRATION DATE: NORMAL
BLOOD UNIT TYPE CODE: 5100
BLOOD UNIT TYPE: NORMAL
BUN SERPL-MCNC: 22 MG/DL (ref 6–20)
CALCIUM SERPL-MCNC: 7.9 MG/DL (ref 8.7–10.5)
CHLORIDE SERPL-SCNC: 91 MMOL/L (ref 95–110)
CO2 SERPL-SCNC: 22 MMOL/L (ref 23–29)
CODING SYSTEM: NORMAL
CREAT SERPL-MCNC: 4.2 MG/DL (ref 0.5–1.4)
CROSSMATCH INTERPRETATION: NORMAL
DIFFERENTIAL METHOD BLD: ABNORMAL
DISPENSE STATUS: NORMAL
EOSINOPHIL # BLD AUTO: 0.4 K/UL (ref 0–0.5)
EOSINOPHIL NFR BLD: 4.9 % (ref 0–8)
ERYTHROCYTE [DISTWIDTH] IN BLOOD BY AUTOMATED COUNT: 20.4 % (ref 11.5–14.5)
EST. GFR  (NO RACE VARIABLE): 11.7 ML/MIN/1.73 M^2
FINAL PATHOLOGIC DIAGNOSIS: NORMAL
GLUCOSE SERPL-MCNC: 157 MG/DL (ref 70–110)
GROSS: NORMAL
HCT VFR BLD AUTO: 23.4 % (ref 37–48.5)
HGB BLD-MCNC: 7.7 G/DL (ref 12–16)
IMM GRANULOCYTES # BLD AUTO: 0.03 K/UL (ref 0–0.04)
IMM GRANULOCYTES NFR BLD AUTO: 0.4 % (ref 0–0.5)
LYMPHOCYTES # BLD AUTO: 0.8 K/UL (ref 1–4.8)
LYMPHOCYTES NFR BLD: 11.1 % (ref 18–48)
Lab: NORMAL
MAGNESIUM SERPL-MCNC: 1.8 MG/DL (ref 1.6–2.6)
MCH RBC QN AUTO: 31.2 PG (ref 27–31)
MCHC RBC AUTO-ENTMCNC: 32.9 G/DL (ref 32–36)
MCV RBC AUTO: 95 FL (ref 82–98)
MONOCYTES # BLD AUTO: 1 K/UL (ref 0.3–1)
MONOCYTES NFR BLD: 13.6 % (ref 4–15)
NEUTROPHILS # BLD AUTO: 5.2 K/UL (ref 1.8–7.7)
NEUTROPHILS NFR BLD: 69.1 % (ref 38–73)
NRBC BLD-RTO: 0 /100 WBC
NUM UNITS TRANS PACKED RBC: NORMAL
PHOSPHATE SERPL-MCNC: 3.8 MG/DL (ref 2.7–4.5)
PLATELET # BLD AUTO: 303 K/UL (ref 150–450)
PMV BLD AUTO: 10.5 FL (ref 9.2–12.9)
POCT GLUCOSE: 101 MG/DL (ref 70–110)
POCT GLUCOSE: 163 MG/DL (ref 70–110)
POCT GLUCOSE: 177 MG/DL (ref 70–110)
POTASSIUM SERPL-SCNC: 4.2 MMOL/L (ref 3.5–5.1)
RBC # BLD AUTO: 2.47 M/UL (ref 4–5.4)
SODIUM SERPL-SCNC: 123 MMOL/L (ref 136–145)
VANCOMYCIN SERPL-MCNC: 17.9 UG/ML
WBC # BLD AUTO: 7.57 K/UL (ref 3.9–12.7)

## 2024-11-26 PROCEDURE — 25000003 PHARM REV CODE 250: Mod: HCNC | Performed by: ANESTHESIOLOGY

## 2024-11-26 PROCEDURE — 36415 COLL VENOUS BLD VENIPUNCTURE: CPT | Mod: HCNC | Performed by: FAMILY MEDICINE

## 2024-11-26 PROCEDURE — A4216 STERILE WATER/SALINE, 10 ML: HCPCS | Mod: HCNC | Performed by: ANESTHESIOLOGY

## 2024-11-26 PROCEDURE — 25000003 PHARM REV CODE 250: Mod: HCNC | Performed by: NURSE PRACTITIONER

## 2024-11-26 PROCEDURE — 63600175 PHARM REV CODE 636 W HCPCS: Mod: HCNC | Performed by: FAMILY MEDICINE

## 2024-11-26 PROCEDURE — 63600175 PHARM REV CODE 636 W HCPCS: Mod: HCNC

## 2024-11-26 PROCEDURE — 80100016 HC MAINTENANCE HEMODIALYSIS: Mod: HCNC

## 2024-11-26 PROCEDURE — 83735 ASSAY OF MAGNESIUM: CPT | Mod: HCNC | Performed by: FAMILY MEDICINE

## 2024-11-26 PROCEDURE — 90935 HEMODIALYSIS ONE EVALUATION: CPT | Mod: HCNC,,, | Performed by: NURSE PRACTITIONER

## 2024-11-26 PROCEDURE — 80202 ASSAY OF VANCOMYCIN: CPT | Mod: HCNC | Performed by: FAMILY MEDICINE

## 2024-11-26 PROCEDURE — 80069 RENAL FUNCTION PANEL: CPT | Mod: HCNC | Performed by: FAMILY MEDICINE

## 2024-11-26 PROCEDURE — 25000003 PHARM REV CODE 250: Mod: HCNC | Performed by: HOSPITALIST

## 2024-11-26 PROCEDURE — 63600175 PHARM REV CODE 636 W HCPCS: Mod: HCNC | Performed by: HOSPITALIST

## 2024-11-26 PROCEDURE — 25000003 PHARM REV CODE 250: Mod: HCNC | Performed by: FAMILY MEDICINE

## 2024-11-26 PROCEDURE — 21400001 HC TELEMETRY ROOM: Mod: HCNC

## 2024-11-26 PROCEDURE — 25000003 PHARM REV CODE 250: Mod: HCNC

## 2024-11-26 PROCEDURE — 63600175 PHARM REV CODE 636 W HCPCS: Mod: JG,HCNC

## 2024-11-26 PROCEDURE — 85025 COMPLETE CBC W/AUTO DIFF WBC: CPT | Mod: HCNC | Performed by: FAMILY MEDICINE

## 2024-11-26 RX ORDER — SODIUM CHLORIDE 9 MG/ML
INJECTION, SOLUTION INTRAVENOUS ONCE
Status: COMPLETED | OUTPATIENT
Start: 2024-11-26 | End: 2024-11-26

## 2024-11-26 RX ADMIN — Medication 3 ML: at 12:11

## 2024-11-26 RX ADMIN — EPOETIN ALFA-EPBX 3560 UNITS: 4000 INJECTION, SOLUTION INTRAVENOUS; SUBCUTANEOUS at 09:11

## 2024-11-26 RX ADMIN — VANCOMYCIN HYDROCHLORIDE 750 MG: 750 INJECTION, POWDER, LYOPHILIZED, FOR SOLUTION INTRAVENOUS at 02:11

## 2024-11-26 RX ADMIN — OXYCODONE AND ACETAMINOPHEN 1 TABLET: 10; 325 TABLET ORAL at 04:11

## 2024-11-26 RX ADMIN — HEPARIN SODIUM 1000 UNITS: 1000 INJECTION, SOLUTION INTRAVENOUS; SUBCUTANEOUS at 09:11

## 2024-11-26 RX ADMIN — PREGABALIN 75 MG: 75 CAPSULE ORAL at 04:11

## 2024-11-26 RX ADMIN — ALLOPURINOL 50 MG: 300 TABLET ORAL at 02:11

## 2024-11-26 RX ADMIN — MICONAZOLE NITRATE: 20 OINTMENT TOPICAL at 09:11

## 2024-11-26 RX ADMIN — Medication 6 MG: at 10:11

## 2024-11-26 RX ADMIN — ATORVASTATIN CALCIUM 80 MG: 40 TABLET, FILM COATED ORAL at 02:11

## 2024-11-26 RX ADMIN — ISOSORBIDE DINITRATE 10 MG: 10 TABLET ORAL at 10:11

## 2024-11-26 RX ADMIN — HYDRALAZINE HYDROCHLORIDE 10 MG: 10 TABLET ORAL at 10:11

## 2024-11-26 RX ADMIN — APIXABAN 5 MG: 5 TABLET, FILM COATED ORAL at 02:11

## 2024-11-26 RX ADMIN — CLOPIDOGREL BISULFATE 75 MG: 75 TABLET ORAL at 02:11

## 2024-11-26 RX ADMIN — SEVELAMER CARBONATE 800 MG: 800 TABLET, FILM COATED ORAL at 04:11

## 2024-11-26 RX ADMIN — ISOSORBIDE DINITRATE 10 MG: 10 TABLET ORAL at 02:11

## 2024-11-26 RX ADMIN — CEFEPIME 1 G: 1 INJECTION, POWDER, FOR SOLUTION INTRAMUSCULAR; INTRAVENOUS at 02:11

## 2024-11-26 RX ADMIN — CALCITRIOL 0.5 MCG: 0.5 CAPSULE, LIQUID FILLED ORAL at 02:11

## 2024-11-26 RX ADMIN — SERTRALINE HYDROCHLORIDE 100 MG: 50 TABLET ORAL at 02:11

## 2024-11-26 RX ADMIN — SEVELAMER CARBONATE 800 MG: 800 TABLET, FILM COATED ORAL at 02:11

## 2024-11-26 RX ADMIN — HYDROMORPHONE HYDROCHLORIDE 0.5 MG: 1 INJECTION, SOLUTION INTRAMUSCULAR; INTRAVENOUS; SUBCUTANEOUS at 10:11

## 2024-11-26 RX ADMIN — OXYCODONE AND ACETAMINOPHEN 1 TABLET: 10; 325 TABLET ORAL at 05:11

## 2024-11-26 RX ADMIN — APIXABAN 5 MG: 5 TABLET, FILM COATED ORAL at 10:11

## 2024-11-26 RX ADMIN — Medication 3 ML: at 05:11

## 2024-11-26 RX ADMIN — SODIUM CHLORIDE: 9 INJECTION, SOLUTION INTRAVENOUS at 07:11

## 2024-11-26 NOTE — CONSULTS
[facility]  Wound Care    Patient Name:  Suyapa Connelly   MRN:  [id number]  Date: 11/25/2024  Diagnosis: Cellulitis of left thigh    History:     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    Osteomyelitis     Osteomyelitis of left foot 04/30/2021    Ulcer of left foot     Vaginal delivery     x1       Social History     Socioeconomic History    Marital status:    Occupational History    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      Social Drivers of Health     Financial Resource Strain: Low Risk  (11/16/2024)    Overall Financial Resource Strain (CARDIA)     Difficulty of Paying Living Expenses: Not hard at all   Recent Concern: Financial Resource Strain - Medium Risk (10/29/2024)    Overall Financial Resource Strain (CARDIA)     Difficulty of Paying Living Expenses: Somewhat hard   Food Insecurity: No Food Insecurity (11/16/2024)    Hunger Vital Sign     Worried About Running Out of Food in the Last Year: Never true     Ran Out of Food in the Last Year: Never true   Transportation Needs: No Transportation Needs (11/16/2024)    TRANSPORTATION NEEDS     Transportation : No   Physical Activity: Inactive (11/14/2024)    Exercise Vital Sign     Days of Exercise per Week: 0 days     Minutes of Exercise per Session: 0 min   Stress: No Stress Concern Present (11/16/2024)    [employer] - Occupational Stress Questionnaire     Feeling of Stress : Not  at all   Recent Concern: Stress - Stress Concern Present (10/29/2024)    [employer] - Occupational Stress Questionnaire     Feeling of Stress : To some extent   Housing Stability: Low Risk  (11/16/2024)    Housing Stability Vital Sign     Unable to Pay for Housing in the Last Year: No     Homeless in the Last Year: No       Precautions:     Allergies as of 11/15/2024 - Reviewed 11/15/2024   Allergen Reaction Noted    Ciprofloxacin Itching 07/18/2020    Iodine  01/10/2020    Pcn [penicillins]  11/14/2014       WOC Assessment Details/Treatment     11/25 All POA -  at bedside very involved  Pannus: yeast - AF BC  Inner thighs, perineum, glutes and sacral area - moisture & ??- [facility]  Left lat thigh - 2 dark areas of ? Etiology - [facility]  Left stump - wound vac - Vascular                      11/25/2024

## 2024-11-26 NOTE — SUBJECTIVE & OBJECTIVE
Interval History:   HH is remains stable at this time with no sign of bleeding,has HD today,afebrile.  Review of Systems   Cardiovascular:  Negative for chest pain.   Gastrointestinal:  Negative for abdominal distention.   Skin:  Positive for wound.   Neurological:  Positive for weakness.     Objective:     Vital Signs (Most Recent):  Temp: 97.2 °F (36.2 °C) (11/26/24 1117)  Pulse: 75 (11/26/24 1131)  Resp: 16 (11/26/24 1117)  BP: (!) 104/55 (11/26/24 1117)  SpO2: 98 % (11/26/24 0437) Vital Signs (24h Range):  Temp:  [97.2 °F (36.2 °C)-98.3 °F (36.8 °C)] 97.2 °F (36.2 °C)  Pulse:  [71-77] 75  Resp:  [16-18] 16  SpO2:  [94 %-98 %] 98 %  BP: (104-137)/(53-75) 104/55     Weight: 71.2 kg (156 lb 15.5 oz)  Body mass index is 47.9 kg/m².    Intake/Output Summary (Last 24 hours) at 11/26/2024 1137  Last data filed at 11/26/2024 1110  Gross per 24 hour   Intake 660 ml   Output 2593 ml   Net -1933 ml         Physical Exam  Pulmonary:      Effort: No respiratory distress.   Musculoskeletal:      Cervical back: Normal range of motion.      Comments: Wound vac is in place on stump.   Neurological:      Mental Status: She is alert.             Significant Labs: All pertinent labs within the past 24 hours have been reviewed.  BMP:   Recent Labs   Lab 11/26/24 0324   *   *   K 4.2   CL 91*   CO2 22*   BUN 22*   CREATININE 4.2*   CALCIUM 7.9*   MG 1.8     CBC:   Recent Labs   Lab 11/25/24  0506 11/26/24 0324   WBC 7.60 7.57   HGB 7.7* 7.7*   HCT 24.3* 23.4*    303     CMP:   Recent Labs   Lab 11/25/24  0506 11/26/24  0324   * 123*   K 4.0 4.2   CL 93* 91*   CO2 23 22*   * 157*   BUN 19 22*   CREATININE 3.3* 4.2*   CALCIUM 8.6* 7.9*   ALBUMIN 1.9* 1.8*   ANIONGAP 10 10       Significant Imaging: I have reviewed all pertinent imaging results/findings within the past 24 hours.

## 2024-11-26 NOTE — ASSESSMENT & PLAN NOTE
ID consulted:  Cont vanc/cefepime.   Blood cultures NGTD  Vascular surgery consulted, s/p I&D 11/22.  Expected postop edema and pain  Pain control

## 2024-11-26 NOTE — PROGRESS NOTES
[facility] Medicine  Progress Note    Patient Name: Suyapa Connlely  MRN: [id number]  Patient Class: IP- Inpatient   Admission Date: 11/15/2024  Length of Stay: 10 days  Attending Physician: Kirsten Hernandes, *  Primary Care Provider: Vanessa Noel MD        Subjective:     Principal Problem:Cellulitis of left thigh        HPI:  Per admitting physician (11/15):  58yo female whose PMH includes bilateral BKA, ESRD, DM, HTN, HLD presents with purulence from L stump wound.  She was discharged from this facility 11.12.24 with oral abx or LLE cellulitis after bring admitted for septic shock.  During that admission general surgery was consulted for further evaluation d/t concerns for nec fasc as there was subcutaneous emphysema noted on CT; after their clinical assessment nec fasc was ruled out.  She did receive IV abx during that admission and on discharge was transitioned to oral augmentin and doxy and she went to SNF.  She was sent back to the ED for continued purulence and LLE pain.  Surgery was consulted in the ED and have no plans for intervention at this time.  MR is pending.  White count is normal.  She has been admitted to medicine for further management.    Overview/Hospital Course:  Per prior attending physician, Dr. Traore:  Admitted for LLE cellulitis; failed outpatient oral abx.  Discharged from this facility 11.12.24 with oral abx or LLE cellulitis after bring admitted for septic shock; on discharge was transitioned to oral augmentin and doxy and she went to SNF.  Sent back to the ED for continued purulence and LLE pain.  Surgery was consulted in the ED and have no plans for intervention at this time.  Repeat CT here showed postop change of bilateral above knee amputation, no destructive osseous process to suggest osteomyelitis in the remaining portions of the left femur or in the visualized proximal portion of the cut right femur, skin defect along the medial aspect of the  left thigh with foci of air underlying the wound, additional area of soft tissue contour indentation and focal skin thickening along the anterior thigh, suggestive of possible infectious or inflammatory process versus region of scarring, additional extensive soft tissue edema and skin thickening of the pelvis, partially visualized right lower extremity, and left lower extremity.  MR non diagnostic due to motion artifact.  White count is normal.  Receiving vanc/cefepime; ID consulted.  Repeat MR with sedation postoperative changes from above the knee amputation, 3.5 cm fluid collection at the femoral stump, which could represent a seroma, hematoma, or abscess; no evidence of osteomyelitis.  There are two fluid collections in the left groin measuring up to 2.4 cm, diffuse subcutaneous and intermuscular edema, which could be inflammatory or infectious, diffuse muscle atrophy with corresponding edema like signal, which could represent denervation or myositis.  CTA with runoff showed on the left, there is occlusion of the proximal left common iliac artery with reconstitution more distally though again diffuse circumferential severe plaque involves both the internal and external iliac arteries.  The external iliac artery is patent with diffuse plaque approximately 60-80% diameter narrowing.  The left common femoral is patent.  Multiple calcifications at the bifurcation.  Occluded left SFA bypass graft. Native left SFA also is occluded.  Severe plaque extends into the profundus femoral branches.  Only collateral vessels are identified in the left thigh.  Diffuse calcified plaque in the occluded left SFA noted.  Other findings noted in the result report.  Vascular surgery consulted; tentative plan for intervention 11.22.24.    OR 11/22 with vascular surgery:  Aortogram and BLE angiogram with left common iliac stent placement and left AKA stump debridement.  Postoperative anemia requiring 2 units PRBCs 11/23 and 11/24.  BM  reported by  at bedside, brown stool.  Urine saturated pad noted by , no blood.  Contacted vascular surgery morning of 11/24 to evaluate left upper leg, no concern for ongoing bleeding.  Vascular surgery plans for vac change 11/27.  HH is remains stable at this time with no sign of bleeding,has HD today,afebrile.    Interval History:   HH is remains stable at this time with no sign of bleeding,has HD today,afebrile.  Review of Systems   Cardiovascular:  Negative for chest pain.   Gastrointestinal:  Negative for abdominal distention.   Skin:  Positive for wound.   Neurological:  Positive for weakness.     Objective:     Vital Signs (Most Recent):  Temp: 97.2 °F (36.2 °C) (11/26/24 1117)  Pulse: 75 (11/26/24 1131)  Resp: 16 (11/26/24 1117)  BP: (!) 104/55 (11/26/24 1117)  SpO2: 98 % (11/26/24 0437) Vital Signs (24h Range):  Temp:  [97.2 °F (36.2 °C)-98.3 °F (36.8 °C)] 97.2 °F (36.2 °C)  Pulse:  [71-77] 75  Resp:  [16-18] 16  SpO2:  [94 %-98 %] 98 %  BP: (104-137)/(53-75) 104/55     Weight: 71.2 kg (156 lb 15.5 oz)  Body mass index is 47.9 kg/m².    Intake/Output Summary (Last 24 hours) at 11/26/2024 1137  Last data filed at 11/26/2024 1110  Gross per 24 hour   Intake 660 ml   Output 2593 ml   Net -1933 ml         Physical Exam  Pulmonary:      Effort: No respiratory distress.   Musculoskeletal:      Cervical back: Normal range of motion.      Comments: Wound vac is in place on stump.   Neurological:      Mental Status: She is alert.             Significant Labs: All pertinent labs within the past 24 hours have been reviewed.  BMP:   Recent Labs   Lab 11/26/24  0324   *   *   K 4.2   CL 91*   CO2 22*   BUN 22*   CREATININE 4.2*   CALCIUM 7.9*   MG 1.8     CBC:   Recent Labs   Lab 11/25/24  0506 11/26/24  0324   WBC 7.60 7.57   HGB 7.7* 7.7*   HCT 24.3* 23.4*    303     CMP:   Recent Labs   Lab 11/25/24  0506 11/26/24  0324   * 123*   K 4.0 4.2   CL 93* 91*   CO2 23 22*   *  157*   BUN 19 22*   CREATININE 3.3* 4.2*   CALCIUM 8.6* 7.9*   ALBUMIN 1.9* 1.8*   ANIONGAP 10 10       Significant Imaging: I have reviewed all pertinent imaging results/findings within the past 24 hours.    Assessment/Plan:      * Cellulitis of left thigh  ID consulted:  Cont vanc/cefepime.   Blood cultures NGTD  Vascular surgery consulted, s/p I&D 11/22.  Expected postop edema and pain  Pain control    Postoperative anemia  Anemia is likely due to acute blood loss which was from procedure  . Most recent hemoglobin and hematocrit are listed below.  Recent Labs     11/24/24  0654 11/24/24  1058 11/25/24  0506 11/26/24  0324   HGB 6.4* 6.7* 7.7* 7.7*   HCT 20.0*  --  24.3* 23.4*     Plan  - Monitor serial CBC: Daily  - Transfuse PRBC if patient becomes hemodynamically unstable, symptomatic or H/H drops below 7/21.  - Patient had PRBC   - Patient's anemia is currently improving  - HH is remains stable at this time with no sign of bleeding,    Atherosclerosis of native coronary artery of native heart with angina pectoris   On medical treatments.    Severe obesity (BMI >= 40)  Body mass index is 47.9 kg/m².  Would benefit from dietary and lifestyle modifications in relation to health  Consider dietary/nutrition consult outpatient    ESRD (end stage renal disease)  Consult nephro for HD management. HD TThS    Paroxysmal atrial fibrillation  Eliquis resumed 11/25 per discussion with vascular surgery    Anemia due to chronic kidney disease, on chronic dialysis  Postoperative anemia  Goal directed resuscitation, transfuse for Hgb<7  2 units PRBCs transfused total:  11/23 and 11/24  Asymptomatic  HH is remains stable at this time with no sign of bleeding,has HD today,afebrile.    CAD (coronary artery disease)  Resumed eliquis/plavix 11/25  Cont statin    Type 2 diabetes mellitus with hyperglycemia, with long-term current use of insulin  SSI  Diabetic diet when not NPO  ACHS glucose checks    CAROLIN (generalized anxiety  disorder)  Cont zoloft    Peripheral vascular disease  S/P Aortogram, bilateral lower extremity angiogram w/ L common iliac stent   Cont statin  D/w vasc, resumed Eliquis with Plavix on 11/25  See plan for cellulitis      VTE Risk Mitigation (From admission, onward)           Ordered     apixaban tablet 5 mg  2 times daily         11/25/24 0820     IP VTE HIGH RISK PATIENT  Once         11/20/24 1622     heparin (porcine) injection 1,000 Units  As needed (PRN)         11/16/24 1159     Place sequential compression device  Until discontinued         11/15/24 1648     Reason for No Pharmacological VTE Prophylaxis  Once        Question:  Reasons:  Answer:  Already adequately anticoagulated on oral Anticoagulants    11/15/24 1648                    Discharge Planning   DEVAN: 11/27/2024     Code Status: Full Code   Is the patient medically ready for discharge?:     Reason for patient still in hospital (select all that apply): Patient trending condition  Discharge Plan A: Return to nursing home                  Kirsten Hernandes MD  Department of Hospital Medicine   [facility]

## 2024-11-26 NOTE — PLAN OF CARE
Problem: Hemodialysis  Goal: Safe, Effective Therapy Delivery  Outcome: Progressing  Goal: Effective Tissue Perfusion  Outcome: Progressing  Goal: Absence of Infection Signs and Symptoms  Outcome: Progressing    Dialysis tx ended to the left chest perm cath, heparin locked, capped.    Net fluid removed: 2L    Report given to primary nurse. Pt awaiting transport  by bed from [facility] to room 646

## 2024-11-26 NOTE — NURSING
Dialysis tx started to the left chest perm cath per orders placed by CHRIS Faulkner:      Order Questions    Question Answer   Antibiotics on HD? No   Duration of Treatment 3.5 hours   Dialyzer F180NR   Dialysate Temperature (C) 36.5   Target  mL/min   If unable to maintain flow due to inadequate vascular access patency, patient intolerance (i.e. chest pain, access discomfort) or elevated venous pressure, adjust blood flow rate to a minimum of _____mL/min 100    mL/min   K+ Potassium per Protocol   Ca++ Calcium per Protocol   Na+ Sodium per Protocol   Bicarb Bicarbonate per Protocol   Access to be used Other (please specify)   Target UF 2L   If unable to maintain this UFR due to patient intolerance (i.e. hypotension, chest pain, muscle cramping, nausea or vomiting), adjust UFR to achieve a minimum of _______ liters of UF 0   Fluid Removal Instructions maintain SBP > 90 mmHG

## 2024-11-26 NOTE — SUBJECTIVE & OBJECTIVE
Interval History:  Pt reports increased LLE pain today, minimal relief with current pain regimen.  Denies any chest pain, SOB, palpitations.  No fevers or chills.    Labs personally reviewed:  Hb 7.7, Na 126, K 4.0, Cr 3.3    Discussed with ID:  Cont vanc and cefepime.  Wound cultures if infection suspected on wound VAC change 11/27.    Discussed with vascular surgery:  Plan for wound VAC change on 11/27    Objective:     Vital signs reviewed, stable on room air.        Physical Exam  Vitals reviewed.   Constitutional:       Appearance: She is ill-appearing. She is not toxic-appearing.   HENT:      Head: Normocephalic and atraumatic.   Cardiovascular:      Rate and Rhythm: Normal rate and regular rhythm.      Heart sounds: Normal heart sounds. No murmur heard.  Pulmonary:      Effort: Pulmonary effort is normal.      Breath sounds: Normal breath sounds.   Abdominal:      General: Bowel sounds are normal. There is no distension.      Palpations: Abdomen is soft.      Tenderness: There is no abdominal tenderness.      Comments: No flank pain, no obvious flank bruising.   Musculoskeletal:         General: No deformity. Normal range of motion.      Comments: Bilateral BKA.  LLE wound VAC in place.  Less edematous and but  to palpation.  No obvious bruising.   Skin:     General: Skin is warm and dry.   Neurological:      General: No focal deficit present.      Mental Status: She is alert and oriented to person, place, and time.   Psychiatric:         Mood and Affect: Mood normal.         Behavior: Behavior normal.

## 2024-11-26 NOTE — PLAN OF CARE
Recommendations    Continue Renal Diet, 1500 mL   Will order Novasource daily  Encourage good intakes  RD to monitor weight, intakes, labs, skin integrity    Goals:   Maintain lean body mass during admission   Meet % of nutritional needs with diet  Display s/s of wound healing    Nutrition Goal Status: new  Communication of RD Recs: other (comment) (POC)    Harinder Dave, Dietetic Intern  I certify that I directed the dietetic intern in service delivery and guided them using my skilled judgment. As the cosigning dietitian, I have reviewed the dietetic interns documentation and am responsible for the treatment, assessment, and plan.

## 2024-11-26 NOTE — PROGRESS NOTES
[facility] - Med Surg  Adult Nutrition  Consult Note    SUMMARY     Recommendations    Continue Renal Diet, 1500 mL   Will order Novasource daily  Encourage good intakes  RD to monitor weight, intakes, labs, skin integrity    Goals:   Maintain lean body mass during admission   Meet % of nutritional needs with diet  Display s/s of wound healing    Nutrition Goal Status: new  Communication of RD Recs: other (comment) (POC)    Assessment and Plan    Nutrition Problem  Inadequate energy intake    Related to (etiology):   Limited food acceptance     Signs and Symptoms (as evidenced by):   Decrease intakes in the last few days, 25-50%.     Interventions/Recommendations (treatment strategy):  - Continue Renal Diet, 1500 mL   - Will order Novasource daily  - Encourage good intakes  - RD to monitor weight, intakes, labs, skin integrity    Nutrition Diagnosis Status:   New      Malnutrition Assessment    NFPE to be conducted if clinically indicated    Reason for Assessment    Reason For Assessment: length of stay  Diagnosis: renal disease, diabetes diagnosis/complications, cardiac disease  General Information Comments: RD visited for LOS x 10. Currently admitted for cellulitis, RD notes wounds. PMHx: PVD, T2DM, CAD, anemia 2/2 CKD on dialysis, ESDR on dialysis, above knee amputation bilateral. RD saw pt at bedside, pt and  report good intakes at home, 3 meals a day with snacks, pt drinks protein shakes at dialysis 3 days a week. Intakes have declined recently because the food has not tasted great today the textures were too tough, intakes recently have declined to 25-50%.  Nutrition Discharge Planning: Pending clinical course    Nutrition Risk Screen    Nutrition Risk Screen: large or nonhealing wound, burn or pressure injury    Nutrition Related Social Determinants of Health: SDOH: Adequate food in home environment    Nutrition/Diet History    Typical Food/Fluid Intake: 3 meals daily with snacks  Food  Preferences: Creole foods  Spiritual, Cultural Beliefs, Sabianist Practices, Values that Affect Care: no  Supplemental Drinks or Food Habits: Novasource Renal  Vitamin/Mineral/Herbal Supplements: MVI  Food Allergies: NKFA    Anthropometrics    Temp: 97.2 °F (36.2 °C)  Height: 4' (121.9 cm)  Height (inches): 48 in  Weight: 71.2 kg (156 lb 15.5 oz)  Weight (lb): 156.97 lb  Ideal Body Weight (IBW), Female: 40 lb  % Ideal Body Weight, Female (lb): 392.43 %  BMI (Calculated): 47.9  BMI Grade: greater than 40 - morbid obesity  Amputation %: 5.9, 5.9  Total Amputation %: 11.8  Amputation Ideal Body Weight (IBW), Female (lb): 28.2 lb  Amputee BMI (kg/m2): 54.46 kg/m2    Lab/Procedures/Meds    Pertinent Labs Reviewed: reviewed  Pertinent Labs Comments: H/H: 7.7/23.4, MCH: 31.2, Na: 123, BUN: 22, Creatinine: GFR: 11.7, Glucose: 157, Calcium: 7.9, CRP: 74.3  Pertinent Medications Reviewed: reviewed  Pertinent Medications Comments: allopurinol, apixaban, atorvastatin, carvedilol, cefipime, epoetin nina-epbx    Estimated/Assessed Needs    Weight Used For Calorie Calculations: 71.2 kg (156 lb 15.5 oz)  Energy Calorie Requirements (kcal): 4419-7216 kcal (25-35 kcal/kg (NPIAP))  Energy Need Method: Kcal/kg  Protein Requirements: 86 g (1.2 g/kg (ESDR on Dialysis))  Weight Used For Protein Calculations: 71.2 kg (156 lb 15.5 oz)  Fluid Requirements (mL): 1500 mL  Estimated Fluid Requirement Method: other (see comments) (Fluid restriction)  RDA Method (mL): 1780  CHO Requirement: 200-405 g      Nutrition Prescription Ordered    Current Diet Order: Renal Diet  Nutrition Order Comments: Consider Novasource if intakes <50%    Evaluation of Received Nutrient/Fluid Intake    I/O: -3.48 L since admin  Comments: LBM: 11/25  % Intake of Estimated Energy Needs: 25 - 50 %  % Meal Intake: 25 - 50 %    Nutrition Risk    Level of Risk/Frequency of Follow-up: high     Monitor and Evaluation    Food and Nutrient Intake: energy intake, food and  beverage intake  Food and Nutrient Adminstration: diet order  Knowledge/Beliefs/Attitudes: food and nutrition knowledge/skill, beliefs and attitudes  Physical Activity and Function: nutrition-related ADLs and IADLs  Anthropometric Measurements: weight change, weight, body mass index  Biochemical Data, Medical Tests and Procedures: electrolyte and renal panel, gastrointestinal profile, inflammatory profile, glucose/endocrine profile, lipid profile  Nutrition-Focused Physical Findings: overall appearance, skin       Nutrition Follow-Up    RD Follow-up?: Yes    Harinder Dave, Dietetic Intern  I certify that I directed the dietetic intern in service delivery and guided them using my skilled judgment. As the cosigning dietitian, I have reviewed the dietetic interns documentation and am responsible for the treatment, assessment, and plan.

## 2024-11-26 NOTE — PLAN OF CARE

## 2024-11-26 NOTE — PROGRESS NOTES
Pharmacokinetic Assessment Follow Up: IV Vancomycin    Vancomycin serum concentration assessment(s)/plan:    - The random level was drawn correctly and can be used to guide therapy at this time.   - The measurement is within the desired definitive target range of 10 to 20 mcg/mL.  - Pt is scheduled to get dialysis 11/26. Will order vancomycin 750mg IV x1  - Re-dose when the random level is less than 20 mcg/mL  - Next level to be drawn with am labs prior to next dialysis session 11/28. Will order a level sooner if dialysis plans change    Drug levels (last 3 results):  Recent Labs   Lab Result Units 11/26/24  0324   Vancomycin, Random ug/mL 17.9       Pharmacy will continue to follow and monitor vancomycin.    Please contact pharmacy at extension [phone] for questions regarding this assessment.    Thank you for the consult,   Neftali Barber       Patient brief summary:  Suyapa Connelly is a 59 y.o. female initiated on antimicrobial therapy with IV Vancomycin for treatment of cellulitis    Drug Allergies:   Review of patient's allergies indicates:   Allergen Reactions    Ciprofloxacin Itching    Pcn [penicillins]      Rash; tolerated ceftriaxone on 1/13/20  Tolerating Augmentin November 2024       Actual Body Weight:   71.2 kg    Renal Function:   Estimated Creatinine Clearance: 11 mL/min (A) (based on SCr of 4.2 mg/dL (H)).,     Dialysis Method (if applicable):  intermittent HD

## 2024-11-26 NOTE — PROGRESS NOTES
[facility] Medicine  Progress Note    Patient Name: Suyapa Connelly  MRN: [id number]  Patient Class: IP- Inpatient   Admission Date: 11/15/2024  Length of Stay: 9 days  Attending Physician: Dimitris Rojo III*  Primary Care Provider: Vanessa Noel MD        Subjective:     Principal Problem:Cellulitis of left thigh        HPI:  Per admitting physician (11/15):  58yo female whose PMH includes bilateral BKA, ESRD, DM, HTN, HLD presents with purulence from L stump wound.  She was discharged from this facility 11.12.24 with oral abx or LLE cellulitis after bring admitted for septic shock.  During that admission general surgery was consulted for further evaluation d/t concerns for nec fasc as there was subcutaneous emphysema noted on CT; after their clinical assessment nec fasc was ruled out.  She did receive IV abx during that admission and on discharge was transitioned to oral augmentin and doxy and she went to SNF.  She was sent back to the ED for continued purulence and LLE pain.  Surgery was consulted in the ED and have no plans for intervention at this time.  MR is pending.  White count is normal.  She has been admitted to medicine for further management.    Overview/Hospital Course:  Per prior attending physician, Dr. Traore:  Admitted for LLE cellulitis; failed outpatient oral abx.  Discharged from this facility 11.12.24 with oral abx or LLE cellulitis after bring admitted for septic shock; on discharge was transitioned to oral augmentin and doxy and she went to SNF.  Sent back to the ED for continued purulence and LLE pain.  Surgery was consulted in the ED and have no plans for intervention at this time.  Repeat CT here showed postop change of bilateral above knee amputation, no destructive osseous process to suggest osteomyelitis in the remaining portions of the left femur or in the visualized proximal portion of the cut right femur, skin defect along the medial aspect of the  left thigh with foci of air underlying the wound, additional area of soft tissue contour indentation and focal skin thickening along the anterior thigh, suggestive of possible infectious or inflammatory process versus region of scarring, additional extensive soft tissue edema and skin thickening of the pelvis, partially visualized right lower extremity, and left lower extremity.  MR non diagnostic due to motion artifact.  White count is normal.  Receiving vanc/cefepime; ID consulted.  Repeat MR with sedation postoperative changes from above the knee amputation, 3.5 cm fluid collection at the femoral stump, which could represent a seroma, hematoma, or abscess; no evidence of osteomyelitis.  There are two fluid collections in the left groin measuring up to 2.4 cm, diffuse subcutaneous and intermuscular edema, which could be inflammatory or infectious, diffuse muscle atrophy with corresponding edema like signal, which could represent denervation or myositis.  CTA with runoff showed on the left, there is occlusion of the proximal left common iliac artery with reconstitution more distally though again diffuse circumferential severe plaque involves both the internal and external iliac arteries.  The external iliac artery is patent with diffuse plaque approximately 60-80% diameter narrowing.  The left common femoral is patent.  Multiple calcifications at the bifurcation.  Occluded left SFA bypass graft. Native left SFA also is occluded.  Severe plaque extends into the profundus femoral branches.  Only collateral vessels are identified in the left thigh.  Diffuse calcified plaque in the occluded left SFA noted.  Other findings noted in the result report.  Vascular surgery consulted; tentative plan for intervention 11.22.24.    OR 11/22 with vascular surgery:  Aortogram and BLE angiogram with left common iliac stent placement and left AKA stump debridement.  Postoperative anemia requiring 2 units PRBCs 11/23 and 11/24.  BM  reported by  at bedside, brown stool.  Urine saturated pad noted by , no blood.  Contacted vascular surgery morning of 11/24 to evaluate left upper leg, no concern for ongoing bleeding.  Vascular surgery plans for vac change 11/27    Interval History:  Pt reports increased LLE pain today, minimal relief with current pain regimen.  Denies any chest pain, SOB, palpitations.  No fevers or chills.    Labs personally reviewed:  Hb 7.7, Na 126, K 4.0, Cr 3.3    Discussed with ID:  Cont vanc and cefepime.  Wound cultures if infection suspected on wound VAC change 11/27.    Discussed with vascular surgery:  Plan for wound VAC change on 11/27    Objective:     Vital signs reviewed, stable on room air.        Physical Exam  Vitals reviewed.   Constitutional:       Appearance: She is ill-appearing. She is not toxic-appearing.   HENT:      Head: Normocephalic and atraumatic.   Cardiovascular:      Rate and Rhythm: Normal rate and regular rhythm.      Heart sounds: Normal heart sounds. No murmur heard.  Pulmonary:      Effort: Pulmonary effort is normal.      Breath sounds: Normal breath sounds.   Abdominal:      General: Bowel sounds are normal. There is no distension.      Palpations: Abdomen is soft.      Tenderness: There is no abdominal tenderness.      Comments: No flank pain, no obvious flank bruising.   Musculoskeletal:         General: No deformity. Normal range of motion.      Comments: Bilateral BKA.  LLE wound VAC in place.  Less edematous and but  to palpation.  No obvious bruising.   Skin:     General: Skin is warm and dry.   Neurological:      General: No focal deficit present.      Mental Status: She is alert and oriented to person, place, and time.   Psychiatric:         Mood and Affect: Mood normal.         Behavior: Behavior normal.           Assessment/Plan:      * Cellulitis of left thigh  ID consulted:  Cont vanc/cefepime.   Blood cultures NGTD  Vascular surgery consulted, s/p I&D  11/22.  Expected postop edema and pain  Pain control    Peripheral vascular disease  S/P Aortogram, bilateral lower extremity angiogram w/ L common iliac stent   Cont statin  D/w vasc, resumed Eliquis with Plavix on 11/25  See plan for cellulitis    Anemia due to chronic kidney disease, on chronic dialysis  Postoperative anemia  Goal directed resuscitation, transfuse for Hgb<7  2 units PRBCs transfused total:  11/23 and 11/24  Asymptomatic    Severe obesity (BMI >= 40)  Body mass index is 47.9 kg/m².  Would benefit from dietary and lifestyle modifications in relation to health  Consider dietary/nutrition consult outpatient    ESRD (end stage renal disease)  Consult nephro for HD management. HD TThS    Paroxysmal atrial fibrillation  Eliquis resumed 11/25 per discussion with vascular surgery    CAD (coronary artery disease)  Resumed eliquis/plavix 11/25  Cont statin    Type 2 diabetes mellitus with hyperglycemia, with long-term current use of insulin  SSI  Diabetic diet when not NPO  ACHS glucose checks    CAROLIN (generalized anxiety disorder)  Cont zoloft      VTE Risk Mitigation (From admission, onward)           Ordered     apixaban tablet 5 mg  2 times daily         11/25/24 0820     IP VTE HIGH RISK PATIENT  Once         11/20/24 1622     heparin (porcine) injection 1,000 Units  As needed (PRN)         11/16/24 1159     Place sequential compression device  Until discontinued         11/15/24 1648     Reason for No Pharmacological VTE Prophylaxis  Once        Question:  Reasons:  Answer:  Already adequately anticoagulated on oral Anticoagulants    11/15/24 1648                    Discharge Planning   DEVAN: 11/27/2024     Code Status: Full Code   Is the patient medically ready for discharge?:     Reason for patient still in hospital (select all that apply): Patient trending condition, Laboratory test, Treatment, Consult recommendations, and Pending disposition  Discharge Plan A: Return to nursing home                   Dimitris Rojo III, MD  Department of Hospital Medicine   [facility] S[facility]

## 2024-11-26 NOTE — PROGRESS NOTES
[facility] NOTE     Patient currently on hemodialysis for removal of uremic toxins .     Patient seen and evaluated on hemodialysis, tolerating treatment, see HD flowsheet for vitals and assessments.      No Hypotension, chest pain, shortness of breath, cramping, nausea or vomiting.     Target UF: 2 L as tolerated, keep MAP >65.  Admitted with cellulitis of L AKA stump. ID follow with adjustments to antibiotic therapy. Wound vac noted.   Hgb 7.7, will plan for EPO.  Continue Sevelamer and Calcitriol, phos 3.8. Will repeat PTH.   Na 123, adjustments made to Na bath.   Consider renal diet restrictions; please limit daily intake to 32 oz per day.   No lab stick/BP intake on access site  Continue to monitor intake and output, daily weights   Please avoid gadolinium, fleets, phos-based laxatives, NSAIDs  Will follow closely and continue dialysis treatments while in-patient    KATHLEEN Faulkner DNP, APRN, FNP-C  Department of Nephrology  [facility]  Pager: [phone]

## 2024-11-26 NOTE — ASSESSMENT & PLAN NOTE
Anemia is likely due to acute blood loss which was from procedure  . Most recent hemoglobin and hematocrit are listed below.  Recent Labs     11/24/24  0654 11/24/24  1058 11/25/24  0506 11/26/24  0324   HGB 6.4* 6.7* 7.7* 7.7*   HCT 20.0*  --  24.3* 23.4*     Plan  - Monitor serial CBC: Daily  - Transfuse PRBC if patient becomes hemodynamically unstable, symptomatic or H/H drops below 7/21.  - Patient had PRBC   - Patient's anemia is currently improving  - HH is remains stable at this time with no sign of bleeding,

## 2024-11-26 NOTE — ASSESSMENT & PLAN NOTE
Postoperative anemia  Goal directed resuscitation, transfuse for Hgb<7  2 units PRBCs transfused total:  11/23 and 11/24  Asymptomatic  HH is remains stable at this time with no sign of bleeding,has HD today,afebrile.

## 2024-11-27 ENCOUNTER — OFFICE VISIT (OUTPATIENT)
Dept: DIALYSIS | Facility: HOSPITAL | Age: 59
DRG: 463 | End: 2024-11-27
Attending: STUDENT IN AN ORGANIZED HEALTH CARE EDUCATION/TRAINING PROGRAM
Payer: MEDICARE

## 2024-11-27 LAB
ALBUMIN SERPL BCP-MCNC: 1.8 G/DL (ref 3.5–5.2)
ANION GAP SERPL CALC-SCNC: 9 MMOL/L (ref 8–16)
BASOPHILS # BLD AUTO: 0.08 K/UL (ref 0–0.2)
BASOPHILS NFR BLD: 1.2 % (ref 0–1.9)
BUN SERPL-MCNC: 13 MG/DL (ref 6–20)
CALCIUM SERPL-MCNC: 8.5 MG/DL (ref 8.7–10.5)
CHLORIDE SERPL-SCNC: 98 MMOL/L (ref 95–110)
CO2 SERPL-SCNC: 20 MMOL/L (ref 23–29)
CREAT SERPL-MCNC: 3 MG/DL (ref 0.5–1.4)
DIFFERENTIAL METHOD BLD: ABNORMAL
EOSINOPHIL # BLD AUTO: 0.3 K/UL (ref 0–0.5)
EOSINOPHIL NFR BLD: 3.8 % (ref 0–8)
ERYTHROCYTE [DISTWIDTH] IN BLOOD BY AUTOMATED COUNT: 19.9 % (ref 11.5–14.5)
EST. GFR  (NO RACE VARIABLE): 17.5 ML/MIN/1.73 M^2
GLUCOSE SERPL-MCNC: 82 MG/DL (ref 70–110)
HCT VFR BLD AUTO: 23.1 % (ref 37–48.5)
HGB BLD-MCNC: 7.4 G/DL (ref 12–16)
IMM GRANULOCYTES # BLD AUTO: 0.03 K/UL (ref 0–0.04)
IMM GRANULOCYTES NFR BLD AUTO: 0.4 % (ref 0–0.5)
LYMPHOCYTES # BLD AUTO: 0.7 K/UL (ref 1–4.8)
LYMPHOCYTES NFR BLD: 10.8 % (ref 18–48)
MAGNESIUM SERPL-MCNC: 1.9 MG/DL (ref 1.6–2.6)
MCH RBC QN AUTO: 30.6 PG (ref 27–31)
MCHC RBC AUTO-ENTMCNC: 32 G/DL (ref 32–36)
MCV RBC AUTO: 96 FL (ref 82–98)
MONOCYTES # BLD AUTO: 1.1 K/UL (ref 0.3–1)
MONOCYTES NFR BLD: 15.6 % (ref 4–15)
NEUTROPHILS # BLD AUTO: 4.7 K/UL (ref 1.8–7.7)
NEUTROPHILS NFR BLD: 68.2 % (ref 38–73)
NRBC BLD-RTO: 0 /100 WBC
PHOSPHATE SERPL-MCNC: 3 MG/DL (ref 2.7–4.5)
PLATELET # BLD AUTO: 280 K/UL (ref 150–450)
PMV BLD AUTO: 10.8 FL (ref 9.2–12.9)
POCT GLUCOSE: 100 MG/DL (ref 70–110)
POCT GLUCOSE: 86 MG/DL (ref 70–110)
POCT GLUCOSE: 90 MG/DL (ref 70–110)
POCT GLUCOSE: 95 MG/DL (ref 70–110)
POTASSIUM SERPL-SCNC: 4.4 MMOL/L (ref 3.5–5.1)
RBC # BLD AUTO: 2.42 M/UL (ref 4–5.4)
SODIUM SERPL-SCNC: 127 MMOL/L (ref 136–145)
VANCOMYCIN SERPL-MCNC: 17.9 UG/ML
WBC # BLD AUTO: 6.85 K/UL (ref 3.9–12.7)

## 2024-11-27 PROCEDURE — 80202 ASSAY OF VANCOMYCIN: CPT | Mod: HCNC | Performed by: HOSPITALIST

## 2024-11-27 PROCEDURE — 63600175 PHARM REV CODE 636 W HCPCS: Mod: HCNC

## 2024-11-27 PROCEDURE — 85025 COMPLETE CBC W/AUTO DIFF WBC: CPT | Mod: HCNC | Performed by: FAMILY MEDICINE

## 2024-11-27 PROCEDURE — 63600175 PHARM REV CODE 636 W HCPCS: Mod: HCNC | Performed by: STUDENT IN AN ORGANIZED HEALTH CARE EDUCATION/TRAINING PROGRAM

## 2024-11-27 PROCEDURE — 25000003 PHARM REV CODE 250: Mod: HCNC | Performed by: ANESTHESIOLOGY

## 2024-11-27 PROCEDURE — 83735 ASSAY OF MAGNESIUM: CPT | Mod: HCNC | Performed by: FAMILY MEDICINE

## 2024-11-27 PROCEDURE — 63600175 PHARM REV CODE 636 W HCPCS: Mod: HCNC | Performed by: FAMILY MEDICINE

## 2024-11-27 PROCEDURE — 25000003 PHARM REV CODE 250: Mod: HCNC | Performed by: NURSE PRACTITIONER

## 2024-11-27 PROCEDURE — 25000003 PHARM REV CODE 250: Mod: HCNC

## 2024-11-27 PROCEDURE — A4216 STERILE WATER/SALINE, 10 ML: HCPCS | Mod: HCNC | Performed by: ANESTHESIOLOGY

## 2024-11-27 PROCEDURE — 99232 SBSQ HOSP IP/OBS MODERATE 35: CPT | Mod: HCNC,,, | Performed by: NURSE PRACTITIONER

## 2024-11-27 PROCEDURE — 3066F NEPHROPATHY DOC TX: CPT | Mod: HCNC,CPTII,, | Performed by: NURSE PRACTITIONER

## 2024-11-27 PROCEDURE — 90935 HEMODIALYSIS ONE EVALUATION: CPT | Mod: HCNC

## 2024-11-27 PROCEDURE — 3044F HG A1C LEVEL LT 7.0%: CPT | Mod: HCNC,CPTII,, | Performed by: NURSE PRACTITIONER

## 2024-11-27 PROCEDURE — 25000003 PHARM REV CODE 250: Mod: HCNC | Performed by: FAMILY MEDICINE

## 2024-11-27 PROCEDURE — 80069 RENAL FUNCTION PANEL: CPT | Mod: HCNC | Performed by: FAMILY MEDICINE

## 2024-11-27 PROCEDURE — 21400001 HC TELEMETRY ROOM: Mod: HCNC

## 2024-11-27 PROCEDURE — 36415 COLL VENOUS BLD VENIPUNCTURE: CPT | Mod: HCNC | Performed by: HOSPITALIST

## 2024-11-27 PROCEDURE — 36415 COLL VENOUS BLD VENIPUNCTURE: CPT | Mod: HCNC | Performed by: FAMILY MEDICINE

## 2024-11-27 RX ORDER — HYDROMORPHONE HYDROCHLORIDE 1 MG/ML
1 INJECTION, SOLUTION INTRAMUSCULAR; INTRAVENOUS; SUBCUTANEOUS ONCE
Status: COMPLETED | OUTPATIENT
Start: 2024-11-27 | End: 2024-11-27

## 2024-11-27 RX ORDER — SODIUM CHLORIDE 9 MG/ML
INJECTION, SOLUTION INTRAVENOUS ONCE
Status: CANCELLED | OUTPATIENT
Start: 2024-11-27 | End: 2024-11-27

## 2024-11-27 RX ADMIN — HYDROMORPHONE HYDROCHLORIDE 1 MG: 1 INJECTION, SOLUTION INTRAMUSCULAR; INTRAVENOUS; SUBCUTANEOUS at 03:11

## 2024-11-27 RX ADMIN — CEFEPIME 1 G: 1 INJECTION, POWDER, FOR SOLUTION INTRAMUSCULAR; INTRAVENOUS at 10:11

## 2024-11-27 RX ADMIN — Medication 6 MG: at 10:11

## 2024-11-27 RX ADMIN — SEVELAMER CARBONATE 800 MG: 800 TABLET, FILM COATED ORAL at 08:11

## 2024-11-27 RX ADMIN — CALCITRIOL 0.5 MCG: 0.5 CAPSULE, LIQUID FILLED ORAL at 08:11

## 2024-11-27 RX ADMIN — APIXABAN 5 MG: 5 TABLET, FILM COATED ORAL at 08:11

## 2024-11-27 RX ADMIN — CLOPIDOGREL BISULFATE 75 MG: 75 TABLET ORAL at 08:11

## 2024-11-27 RX ADMIN — ATORVASTATIN CALCIUM 80 MG: 40 TABLET, FILM COATED ORAL at 08:11

## 2024-11-27 RX ADMIN — ACETAMINOPHEN 1000 MG: 500 TABLET ORAL at 10:11

## 2024-11-27 RX ADMIN — CINACALCET HYDROCHLORIDE 30 MG: 30 TABLET, FILM COATED ORAL at 04:11

## 2024-11-27 RX ADMIN — HYDROMORPHONE HYDROCHLORIDE 0.5 MG: 1 INJECTION, SOLUTION INTRAMUSCULAR; INTRAVENOUS; SUBCUTANEOUS at 01:11

## 2024-11-27 RX ADMIN — MICONAZOLE NITRATE: 20 OINTMENT TOPICAL at 09:11

## 2024-11-27 RX ADMIN — PREGABALIN 75 MG: 75 CAPSULE ORAL at 04:11

## 2024-11-27 RX ADMIN — Medication 3 ML: at 12:11

## 2024-11-27 RX ADMIN — HEPARIN SODIUM 1000 UNITS: 1000 INJECTION, SOLUTION INTRAVENOUS; SUBCUTANEOUS at 01:11

## 2024-11-27 RX ADMIN — SERTRALINE HYDROCHLORIDE 100 MG: 50 TABLET ORAL at 08:11

## 2024-11-27 RX ADMIN — APIXABAN 5 MG: 5 TABLET, FILM COATED ORAL at 10:11

## 2024-11-27 RX ADMIN — Medication 3 ML: at 06:11

## 2024-11-27 RX ADMIN — HYDROMORPHONE HYDROCHLORIDE 0.5 MG: 1 INJECTION, SOLUTION INTRAMUSCULAR; INTRAVENOUS; SUBCUTANEOUS at 10:11

## 2024-11-27 RX ADMIN — SEVELAMER CARBONATE 800 MG: 800 TABLET, FILM COATED ORAL at 04:11

## 2024-11-27 RX ADMIN — OXYCODONE AND ACETAMINOPHEN 1 TABLET: 10; 325 TABLET ORAL at 08:11

## 2024-11-27 NOTE — ASSESSMENT & PLAN NOTE
59 year old female hx of ESRD on HD TTS admitted for cellulitis left thigh. Last HD 11/14/24. Nephrology consulted for HD management.    Nephrology History  -Outpatient HD [facility]: [facility] ?  -Nephrologist: MD Kerry  -HD tx days: TTS  -HD tx time: ?  -Last HD tx: 11/14/24  -HD access: L TDC  -HD modality: iHD  -Residual urine: anuric  -EDW:  67-67.5 kg ?    Plan/Recommendations  ESRD on HD:  -Will plan for additional HD today for clearance and volume management. Patient with persistent hyponatremia. Appears asymptomatic. Will adjust Na bath. 1.5 L fluid restriction adjusted to 1 L.   -Strict I&Os  Anemia in ESRD  -Hgb goal 10-11, continue Epo  -Iron c/I in the setting of infection  Mineral Bone Disease in ESRD  -Continue OP sensipar and calcitriol  -sevelamer 800 mg PO TIDWM, phos level 3.0, will monitor   -Renal diet if not NPO

## 2024-11-27 NOTE — PROGRESS NOTES
11/27/24 1410   Vital Signs   Temp 97.7 °F (36.5 °C)   Temp Source Oral   Pulse 78   Heart Rate Source Monitor   Resp 16   Device (Oxygen Therapy) room air   BP (!) 112/58   MAP (mmHg) 81   BP Location Right forearm   BP Method Automatic   Patient Position Lying     HD tx completed and pt tolerated well.   Tx time: 3.5hrs.  Net UF: 1.5L  Pt is alert and stable.VSS.  Report given to DEANGELO Dhillon.

## 2024-11-27 NOTE — ASSESSMENT & PLAN NOTE
Hyponatremia is likely due to renal insufficiency. The patient's most recent sodium results are listed below.  Recent Labs     11/25/24  0506 11/26/24  0324 11/27/24  0425   * 123* 127*     Plan  - Correct the sodium by 4-6mEq in 24 hours.   - Obtain the following studies: HD .    has another HD HD today for severe hyponatremia,gradually improving

## 2024-11-27 NOTE — ASSESSMENT & PLAN NOTE
ID consulted:  Cont vanc/cefepime.   Blood cultures NGTD  Vascular surgery consulted, s/p I&D 11/22.  Expected postop edema and pain  Pain control  will communicate with vascular does need wound  vac at DC time.wound vac will be exchange today by vascular    ID recommending , vanc and Cefepime 1 g q24 hrs post-HD x 14 days (HAILEY: 12/1/24)

## 2024-11-27 NOTE — PROGRESS NOTES
[facility] Medicine  Progress Note    Patient Name: Suyapa Connelly  MRN: [id number]  Patient Class: IP- Inpatient   Admission Date: 11/15/2024  Length of Stay: 11 days  Attending Physician: Kirsten Hernandes, *  Primary Care Provider: Vanessa Noel MD        Subjective:     Principal Problem:Cellulitis of left thigh        HPI:  Per admitting physician (11/15):  60yo female whose PMH includes bilateral BKA, ESRD, DM, HTN, HLD presents with purulence from L stump wound.  She was discharged from this facility 11.12.24 with oral abx or LLE cellulitis after bring admitted for septic shock.  During that admission general surgery was consulted for further evaluation d/t concerns for nec fasc as there was subcutaneous emphysema noted on CT; after their clinical assessment nec fasc was ruled out.  She did receive IV abx during that admission and on discharge was transitioned to oral augmentin and doxy and she went to SNF.  She was sent back to the ED for continued purulence and LLE pain.  Surgery was consulted in the ED and have no plans for intervention at this time.  MR is pending.  White count is normal.  She has been admitted to medicine for further management.    Overview/Hospital Course:  Per prior attending physician, Dr. Traore:  Admitted for LLE cellulitis; failed outpatient oral abx.  Discharged from this facility 11.12.24 with oral abx or LLE cellulitis after bring admitted for septic shock; on discharge was transitioned to oral augmentin and doxy and she went to SNF.  Sent back to the ED for continued purulence and LLE pain.  Surgery was consulted in the ED and have no plans for intervention at this time.  Repeat CT here showed postop change of bilateral above knee amputation, no destructive osseous process to suggest osteomyelitis in the remaining portions of the left femur or in the visualized proximal portion of the cut right femur, skin defect along the medial aspect of the  left thigh with foci of air underlying the wound, additional area of soft tissue contour indentation and focal skin thickening along the anterior thigh, suggestive of possible infectious or inflammatory process versus region of scarring, additional extensive soft tissue edema and skin thickening of the pelvis, partially visualized right lower extremity, and left lower extremity.  MR non diagnostic due to motion artifact.  White count is normal.  Receiving vanc/cefepime; ID consulted.  Repeat MR with sedation postoperative changes from above the knee amputation, 3.5 cm fluid collection at the femoral stump, which could represent a seroma, hematoma, or abscess; no evidence of osteomyelitis.  There are two fluid collections in the left groin measuring up to 2.4 cm, diffuse subcutaneous and intermuscular edema, which could be inflammatory or infectious, diffuse muscle atrophy with corresponding edema like signal, which could represent denervation or myositis.  CTA with runoff showed on the left, there is occlusion of the proximal left common iliac artery with reconstitution more distally though again diffuse circumferential severe plaque involves both the internal and external iliac arteries.  The external iliac artery is patent with diffuse plaque approximately 60-80% diameter narrowing.  The left common femoral is patent.  Multiple calcifications at the bifurcation.  Occluded left SFA bypass graft. Native left SFA also is occluded.  Severe plaque extends into the profundus femoral branches.  Only collateral vessels are identified in the left thigh.  Diffuse calcified plaque in the occluded left SFA noted.  Other findings noted in the result report.  Vascular surgery consulted; tentative plan for intervention 11.22.24.    OR 11/22 with vascular surgery:  Aortogram and BLE angiogram with left common iliac stent placement and left AKA stump debridement.  Postoperative anemia requiring 2 units PRBCs 11/23 and 11/24.  BM  reported by  at bedside, brown stool.  Urine saturated pad noted by , no blood.  Contacted vascular surgery morning of 11/24 to evaluate left upper leg, no concern for ongoing bleeding.  Vascular surgery plans for vac change 11/27.will communicate does need vac at DC time.  HH is remains stable at this time with no sign of bleeding,has another HD HD today for severe hyponatremia,gradually improving  ID recommending , vanc and Cefepime 1 g q24 hrs post-HD x 14 days (HAILEY: 12/1/24)    Interval History:   will communicate does not vac at DC time.  HH is remains stable at this time with no sign of bleeding,has another HD HD today for severe hyponatremia,gradually improving  ID recommending , vanc and Cefepime 1 g q24 hrs post-HD x 14 days (HAILEY: 12/1/24)  Review of Systems   Cardiovascular:  Negative for chest pain.   Gastrointestinal:  Negative for abdominal distention.   Skin:  Positive for wound.   Neurological:  Positive for weakness.     Objective:     Vital Signs (Most Recent):  Temp: 98 °F (36.7 °C) (11/27/24 1040)  Pulse: 74 (11/27/24 1100)  Resp: 18 (11/27/24 1040)  BP: (!) 125/59 (11/27/24 1100)  SpO2: 97 % (11/27/24 1040) Vital Signs (24h Range):  Temp:  [97.2 °F (36.2 °C)-98.3 °F (36.8 °C)] 98 °F (36.7 °C)  Pulse:  [74-84] 74  Resp:  [16-20] 18  SpO2:  [95 %-100 %] 97 %  BP: ()/(51-78) 125/59     Weight: 71.2 kg (156 lb 15.5 oz)  Body mass index is 47.9 kg/m².    Intake/Output Summary (Last 24 hours) at 11/27/2024 1104  Last data filed at 11/26/2024 1110  Gross per 24 hour   Intake 300 ml   Output 2592 ml   Net -2292 ml         Physical Exam  Pulmonary:      Effort: No respiratory distress.   Musculoskeletal:      Cervical back: Normal range of motion.      Comments: Wound vac is in place on stump.   Neurological:      Mental Status: She is alert.             Significant Labs: All pertinent labs within the past 24 hours have been reviewed.  BMP:   Recent Labs   Lab 11/27/24  0425   GLU 82    *   K 4.4   CL 98   CO2 20*   BUN 13   CREATININE 3.0*   CALCIUM 8.5*   MG 1.9     CBC:   Recent Labs   Lab 11/26/24  0324 11/27/24  0425   WBC 7.57 6.85   HGB 7.7* 7.4*   HCT 23.4* 23.1*    280     CMP:   Recent Labs   Lab 11/26/24  0324 11/27/24  0425   * 127*   K 4.2 4.4   CL 91* 98   CO2 22* 20*   * 82   BUN 22* 13   CREATININE 4.2* 3.0*   CALCIUM 7.9* 8.5*   ALBUMIN 1.8* 1.8*   ANIONGAP 10 9       Significant Imaging: I have reviewed all pertinent imaging results/findings within the past 24 hours.    Assessment/Plan:      * Cellulitis of left thigh  ID consulted:  Cont vanc/cefepime.   Blood cultures NGTD  Vascular surgery consulted, s/p I&D 11/22.  Expected postop edema and pain  Pain control  will communicate with vascular does need wound  vac at DC time.wound vac will be exchange today by vascular    ID recommending , vanc and Cefepime 1 g q24 hrs post-HD x 14 days (HAILEY: 12/1/24)    Postoperative anemia  Anemia is likely due to acute blood loss which was from procedure  . Most recent hemoglobin and hematocrit are listed below.  Recent Labs     11/24/24  0654 11/24/24  1058 11/25/24  0506 11/26/24  0324   HGB 6.4* 6.7* 7.7* 7.7*   HCT 20.0*  --  24.3* 23.4*     Plan  - Monitor serial CBC: Daily  - Transfuse PRBC if patient becomes hemodynamically unstable, symptomatic or H/H drops below 7/21.  - Patient had PRBC   - Patient's anemia is currently improving  - HH is remains stable at this time with no sign of bleeding,    Atherosclerosis of native coronary artery of native heart with angina pectoris   On medical treatments.    Severe obesity (BMI >= 40)  Body mass index is 47.9 kg/m².  Would benefit from dietary and lifestyle modifications in relation to health  Consider dietary/nutrition consult outpatient    ESRD (end stage renal disease)  Consult nephro for HD management. HD TThS    Chronic combined systolic and diastolic congestive heart failure  Patient has Combined Systolic and  "Diastolic heart failure that is Chronic. On presentation their CHF was well compensated. Most recent BNP and echo results are listed below.  No results for input(s): "BNP" in the last 72 hours.  Latest ECHO  Results for orders placed during the hospital encounter of 10/28/24    Echo Saline Bubble? No; Ultrasound enhancing contrast? No    Interpretation Summary    Left Ventricle: The left ventricle is normal in size. Mildly increased wall thickness. Regional wall motion abnormalities present. Septal motion is abnormal is consistent with bundle branch block.    Right Ventricle: Severe right ventricular enlargement. Systolic function is severely reduced.    Left Atrium: Atrial septum is bulging to the left.    Right Atrium: Right atrium is severely dilated.    Aortic Valve: The aortic valve is a trileaflet valve. There is mild aortic valve sclerosis. There is mild aortic regurgitation with a centrally directed jet.    Mitral Valve: There is mild regurgitation with a centrally directed jet.    Tricuspid Valve: There is severe regurgitation with a centrally directed jet.    Pulmonary Artery: No pulmonary hypertension. The estimated pulmonary artery systolic pressure is 26 mmHg.    IVC/SVC: Elevated venous pressure at 15 mmHg.    Current Heart Failure Medications  isosorbide dinitrate tablet 10 mg, 2 times daily, Oral    Plan  - Monitor strict I&Os and daily weights.    - Place on telemetry  - Low sodium diet  - Place on fluid restriction of 1.5 L.   - Cardiology has not been consulted  - The patient's volume status is   -fluid removal with HD        Hyponatremia  Hyponatremia is likely due to renal insufficiency. The patient's most recent sodium results are listed below.  Recent Labs     11/25/24  0506 11/26/24  0324 11/27/24  0425   * 123* 127*     Plan  - Correct the sodium by 4-6mEq in 24 hours.   - Obtain the following studies: HD .    has another HD HD today for severe hyponatremia,gradually " improving      Paroxysmal atrial fibrillation  Eliquis resumed 11/25 per discussion with vascular surgery    Anemia due to chronic kidney disease, on chronic dialysis  Postoperative anemia  Goal directed resuscitation, transfuse for Hgb<7  2 units PRBCs transfused total:  11/23 and 11/24  Asymptomatic  HH is remains stable at this time with no sign of bleeding,has HD today,afebrile.    CAD (coronary artery disease)  Resumed eliquis/plavix 11/25  Cont statin    Type 2 diabetes mellitus with hyperglycemia, with long-term current use of insulin  SSI  Diabetic diet when not NPO  ACHS glucose checks    CAROLIN (generalized anxiety disorder)  Cont zoloft    Peripheral vascular disease  S/P Aortogram, bilateral lower extremity angiogram w/ L common iliac stent   Cont statin  D/w vasc, resumed Eliquis with Plavix on 11/25  See plan for cellulitis      VTE Risk Mitigation (From admission, onward)           Ordered     apixaban tablet 5 mg  2 times daily         11/25/24 0820     IP VTE HIGH RISK PATIENT  Once         11/20/24 1622     heparin (porcine) injection 1,000 Units  As needed (PRN)         11/16/24 1159     Place sequential compression device  Until discontinued         11/15/24 1648     Reason for No Pharmacological VTE Prophylaxis  Once        Question:  Reasons:  Answer:  Already adequately anticoagulated on oral Anticoagulants    11/15/24 1648                    Discharge Planning   DEVAN: 11/27/2024     Code Status: Full Code   Is the patient medically ready for discharge?:     Reason for patient still in hospital (select all that apply): Patient trending condition  Discharge Plan A: Return to nursing home                  Kirsten Hernandes MD  Department of Hospital Medicine   [facility] Surg

## 2024-11-27 NOTE — NURSING
Pt's  call the nurse's station this afternoon to update the address as follows  Per 's request the correct address pt to go upon D/C is:    [address]  [address]    Phone number: [phone]

## 2024-11-27 NOTE — PROGRESS NOTES
11/27/24 1040   Vital Signs   Temp 98 °F (36.7 °C)   Temp Source Oral   Pulse 78   Heart Rate Source Monitor   Resp 18   SpO2 97 %   Device (Oxygen Therapy) room air   /78   MAP (mmHg) 99   BP Location Right forearm   BP Method Automatic   Patient Position Lying     Received pt via bed alert and stable for tx.  POC discussed with the pt.  HD off day tx started aseptically via [facility] TDC without issue.

## 2024-11-27 NOTE — PLAN OF CARE
[facility] Surg  Discharge Reassessment    Primary Care Provider: Vanessa Noel MD    Expected Discharge Date: 11/27/2024    Reassessment (most recent)       Discharge Reassessment - 11/27/24 1628          Discharge Reassessment    Assessment Type Discharge Planning Reassessment     Did the patient's condition or plan change since previous assessment? No     Discharge Plan discussed with: Patient     Communicated DEVAN with patient/caregiver Yes     Discharge Plan A Skilled Nursing Facility     Discharge Plan B Skilled Nursing Facility     Why the patient remains in the hospital Requires continued medical care                 SW met with pt. and completed discharge reassessment. Patient alert and oriented.  Patient states she is a resident at [facility] and will return upon discharge.       Dispo: A. half-way NH B. SNF     Discharge Plan A and Plan B have been determined by review of patient's clinical status, future medical and therapeutic needs, and coverage/benefits for post-acute care in coordination with multidisciplinary team members.     Olga Brown MSW  Care Management

## 2024-11-27 NOTE — PHYSICIAN QUERY
Please specify the Type and depth of debridement performed on the Left AKA stump:  Excisional debridement of subcutaneous tissue and/or fascia

## 2024-11-27 NOTE — SUBJECTIVE & OBJECTIVE
Interval History:   will communicate does not vac at DC time.  HH is remains stable at this time with no sign of bleeding,has another HD HD today for severe hyponatremia,gradually improving  ID recommending , vanc and Cefepime 1 g q24 hrs post-HD x 14 days (HAILEY: 12/1/24)  Review of Systems   Cardiovascular:  Negative for chest pain.   Gastrointestinal:  Negative for abdominal distention.   Skin:  Positive for wound.   Neurological:  Positive for weakness.     Objective:     Vital Signs (Most Recent):  Temp: 98 °F (36.7 °C) (11/27/24 1040)  Pulse: 74 (11/27/24 1100)  Resp: 18 (11/27/24 1040)  BP: (!) 125/59 (11/27/24 1100)  SpO2: 97 % (11/27/24 1040) Vital Signs (24h Range):  Temp:  [97.2 °F (36.2 °C)-98.3 °F (36.8 °C)] 98 °F (36.7 °C)  Pulse:  [74-84] 74  Resp:  [16-20] 18  SpO2:  [95 %-100 %] 97 %  BP: ()/(51-78) 125/59     Weight: 71.2 kg (156 lb 15.5 oz)  Body mass index is 47.9 kg/m².    Intake/Output Summary (Last 24 hours) at 11/27/2024 1104  Last data filed at 11/26/2024 1110  Gross per 24 hour   Intake 300 ml   Output 2592 ml   Net -2292 ml         Physical Exam  Pulmonary:      Effort: No respiratory distress.   Musculoskeletal:      Cervical back: Normal range of motion.      Comments: Wound vac is in place on stump.   Neurological:      Mental Status: She is alert.             Significant Labs: All pertinent labs within the past 24 hours have been reviewed.  BMP:   Recent Labs   Lab 11/27/24 0425   GLU 82   *   K 4.4   CL 98   CO2 20*   BUN 13   CREATININE 3.0*   CALCIUM 8.5*   MG 1.9     CBC:   Recent Labs   Lab 11/26/24 0324 11/27/24 0425   WBC 7.57 6.85   HGB 7.7* 7.4*   HCT 23.4* 23.1*    280     CMP:   Recent Labs   Lab 11/26/24  0324 11/27/24  0425   * 127*   K 4.2 4.4   CL 91* 98   CO2 22* 20*   * 82   BUN 22* 13   CREATININE 4.2* 3.0*   CALCIUM 7.9* 8.5*   ALBUMIN 1.8* 1.8*   ANIONGAP 10 9       Significant Imaging: I have reviewed all pertinent imaging  results/findings within the past 24 hours.

## 2024-11-27 NOTE — PROGRESS NOTES
Andrew jose luis - [facility] Surg  Nephrology  Progress Note    Patient Name: Suyapa Connelly  MRN: [id number]  Admission Date: 11/15/2024  Hospital Length of Stay: 11 days  Attending Provider: Kirsten Hernandes, *   Primary Care Physician: Vanessa Noel MD  Principal Problem:Cellulitis of left thigh    Subjective:     Interval History: HD completed yesterday with 2 L removed. Na 123->127 after HD yesterday. No distress on exam. Amendable to additional HD today to correct Na level.     Review of patient's allergies indicates:   Allergen Reactions    Ciprofloxacin Itching    Pcn [penicillins]      Rash; tolerated ceftriaxone on 1/13/20  Tolerating Augmentin November 2024     Current Facility-Administered Medications   Medication Frequency    0.9%  NaCl infusion (for blood administration) Q24H PRN    0.9%  NaCl infusion (for blood administration) Q24H PRN    acetaminophen tablet 1,000 mg Q8H PRN    allopurinol split tablet 50 mg Every other day    apixaban tablet 5 mg BID    atorvastatin tablet 80 mg Daily    balsam peru-castor oiL Oint BID    calcitRIOL capsule 0.5 mcg Daily    ceFEPIme injection 1 g Q24H    cinacalcet tablet 30 mg Every Mon, Wed, Fri    clopidogreL tablet 75 mg Daily    dextrose 10% bolus 125 mL 125 mL PRN    dextrose 10% bolus 125 mL 125 mL PRN    dextrose 10% bolus 125 mL 125 mL PRN    dextrose 10% bolus 250 mL 250 mL PRN    dextrose 10% bolus 250 mL 250 mL PRN    dextrose 10% bolus 250 mL 250 mL PRN    epoetin nina-epbx injection 3,560 Units Every Tues, Thurs, Sat    glucagon (human recombinant) injection 1 mg PRN    glucose chewable tablet 16 g PRN    glucose chewable tablet 24 g PRN    heparin (porcine) injection 1,000 Units PRN    hydrALAZINE tablet 10 mg Q12H    HYDROmorphone injection 0.5 mg Q4H PRN    insulin aspart U-100 pen 0-5 Units QID (AC + HS) PRN    isosorbide dinitrate tablet 10 mg BID    melatonin tablet 6 mg Nightly    miconazole nitrate 2% ointment BID    naloxone 0.4 mg/mL  injection 0.02 mg PRN    oxyCODONE-acetaminophen  mg per tablet 1 tablet Q4H PRN    pregabalin capsule 75 mg Daily    sertraline tablet 100 mg Daily    sevelamer carbonate tablet 800 mg TID WM    sodium chloride 0.9% flush 10 mL Q12H PRN    sodium chloride 0.9% flush 3 mL Q6H    vancomycin - pharmacy to dose pharmacy to manage frequency       Objective:     Vital Signs (Most Recent):  Temp: 98.3 °F (36.8 °C) (11/27/24 0817)  Pulse: 77 (11/27/24 0817)  Resp: 18 (11/27/24 0817)  BP: 106/68 (11/27/24 0817)  SpO2: 96 % (11/27/24 0817) Vital Signs (24h Range):  Temp:  [97.2 °F (36.2 °C)-98.3 °F (36.8 °C)] 98.3 °F (36.8 °C)  Pulse:  [72-84] 77  Resp:  [16-20] 18  SpO2:  [95 %-100 %] 96 %  BP: ()/(51-73) 106/68     Weight: 71.2 kg (156 lb 15.5 oz) (11/26/24 1042)  Body mass index is 47.9 kg/m².  Body surface area is 1.55 meters squared.    I/O last 3 completed shifts:  In: 660 [P.O.:360; Other:300]  Out: 2593 [Other:2592; Stool:1]     Physical Exam  Vitals and nursing note reviewed.   Constitutional:       Appearance: She is ill-appearing. She is not toxic-appearing.   HENT:      Head: Normocephalic and atraumatic.   Cardiovascular:      Rate and Rhythm: Normal rate and regular rhythm.      Heart sounds: Normal heart sounds.   Pulmonary:      Effort: Pulmonary effort is normal.      Breath sounds: Normal breath sounds.   Abdominal:      General: Bowel sounds are normal. There is no distension.      Palpations: Abdomen is soft.   Musculoskeletal:         General: Deformity present. Normal range of motion.      Comments: Bilateral BKA.  LLE wound VAC in place.   Skin:     General: Skin is warm and dry.   Neurological:      General: No focal deficit present.      Mental Status: She is alert and oriented to person, place, and time.   Psychiatric:         Mood and Affect: Mood normal.         Behavior: Behavior normal.          Significant Labs:  CBC:   Recent Labs   Lab 11/27/24  0425   WBC 6.85   RBC 2.42*   HGB  7.4*   HCT 23.1*      MCV 96   MCH 30.6   MCHC 32.0     CMP:   Recent Labs   Lab 11/27/24  0425   GLU 82   CALCIUM 8.5*   ALBUMIN 1.8*   *   K 4.4   CO2 20*   CL 98   BUN 13   CREATININE 3.0*     All labs within the past 24 hours have been reviewed.     Assessment/Plan:     Renal/  ESRD (end stage renal disease)  59 year old female hx of ESRD on HD TTS admitted for cellulitis left thigh. Last HD 11/14/24. Nephrology consulted for HD management.    Nephrology History  -Outpatient HD unit: [facility] ?  -Nephrologist: MD Kerry  -HD tx days: TTS  -HD tx time: ?  -Last HD tx: 11/14/24  -HD access: L TDC  -HD modality: iHD  -Residual urine: anuric  -EDW:  67-67.5 kg ?    Plan/Recommendations  ESRD on HD:  -Will plan for additional HD today for clearance and volume management. Patient with persistent hyponatremia. Appears asymptomatic. Will adjust Na bath. 1.5 L fluid restriction adjusted to 1 L.   -Strict I&Os  Anemia in ESRD  -Hgb goal 10-11, continue Epo  -Iron c/I in the setting of infection  Mineral Bone Disease in ESRD  -Continue OP sensipar and calcitriol  -sevelamer 800 mg PO TIDWM, phos level 3.0, will monitor   -Renal diet if not NPO      ID  * Cellulitis of left thigh  -management per jax        Thank you for your consult. I will follow-up with patient. Please contact us if you have any additional questions.    Sarah Faulkner DNP, FNP-C  Nephrology  [facility] - Med Surg

## 2024-11-27 NOTE — ASSESSMENT & PLAN NOTE
"Patient has Combined Systolic and Diastolic heart failure that is Chronic. On presentation their CHF was well compensated. Most recent BNP and echo results are listed below.  No results for input(s): "BNP" in the last 72 hours.  Latest ECHO  Results for orders placed during the hospital encounter of 10/28/24    Echo Saline Bubble? No; Ultrasound enhancing contrast? No    Interpretation Summary    Left Ventricle: The left ventricle is normal in size. Mildly increased wall thickness. Regional wall motion abnormalities present. Septal motion is abnormal is consistent with bundle branch block.    Right Ventricle: Severe right ventricular enlargement. Systolic function is severely reduced.    Left Atrium: Atrial septum is bulging to the left.    Right Atrium: Right atrium is severely dilated.    Aortic Valve: The aortic valve is a trileaflet valve. There is mild aortic valve sclerosis. There is mild aortic regurgitation with a centrally directed jet.    Mitral Valve: There is mild regurgitation with a centrally directed jet.    Tricuspid Valve: There is severe regurgitation with a centrally directed jet.    Pulmonary Artery: No pulmonary hypertension. The estimated pulmonary artery systolic pressure is 26 mmHg.    IVC/SVC: Elevated venous pressure at 15 mmHg.    Current Heart Failure Medications  isosorbide dinitrate tablet 10 mg, 2 times daily, Oral    Plan  - Monitor strict I&Os and daily weights.    - Place on telemetry  - Low sodium diet  - Place on fluid restriction of 1.5 L.   - Cardiology has not been consulted  - The patient's volume status is   -fluid removal with HD      "

## 2024-11-28 LAB
ALBUMIN SERPL BCP-MCNC: 2 G/DL (ref 3.5–5.2)
ANION GAP SERPL CALC-SCNC: 13 MMOL/L (ref 8–16)
BASOPHILS # BLD AUTO: 0.1 K/UL (ref 0–0.2)
BASOPHILS NFR BLD: 1.3 % (ref 0–1.9)
BUN SERPL-MCNC: 10 MG/DL (ref 6–20)
CALCIUM SERPL-MCNC: 8.4 MG/DL (ref 8.7–10.5)
CHLORIDE SERPL-SCNC: 97 MMOL/L (ref 95–110)
CO2 SERPL-SCNC: 19 MMOL/L (ref 23–29)
CREAT SERPL-MCNC: 2.3 MG/DL (ref 0.5–1.4)
DIFFERENTIAL METHOD BLD: ABNORMAL
EOSINOPHIL # BLD AUTO: 0.2 K/UL (ref 0–0.5)
EOSINOPHIL NFR BLD: 2.5 % (ref 0–8)
ERYTHROCYTE [DISTWIDTH] IN BLOOD BY AUTOMATED COUNT: 20.3 % (ref 11.5–14.5)
EST. GFR  (NO RACE VARIABLE): 24 ML/MIN/1.73 M^2
GLUCOSE SERPL-MCNC: 78 MG/DL (ref 70–110)
HCT VFR BLD AUTO: 27.5 % (ref 37–48.5)
HGB BLD-MCNC: 8.4 G/DL (ref 12–16)
IMM GRANULOCYTES # BLD AUTO: 0.02 K/UL (ref 0–0.04)
IMM GRANULOCYTES NFR BLD AUTO: 0.3 % (ref 0–0.5)
LYMPHOCYTES # BLD AUTO: 0.8 K/UL (ref 1–4.8)
LYMPHOCYTES NFR BLD: 9.7 % (ref 18–48)
MAGNESIUM SERPL-MCNC: 1.9 MG/DL (ref 1.6–2.6)
MCH RBC QN AUTO: 30.9 PG (ref 27–31)
MCHC RBC AUTO-ENTMCNC: 30.5 G/DL (ref 32–36)
MCV RBC AUTO: 101 FL (ref 82–98)
MONOCYTES # BLD AUTO: 1.1 K/UL (ref 0.3–1)
MONOCYTES NFR BLD: 13.5 % (ref 4–15)
NEUTROPHILS # BLD AUTO: 5.7 K/UL (ref 1.8–7.7)
NEUTROPHILS NFR BLD: 72.7 % (ref 38–73)
NRBC BLD-RTO: 0 /100 WBC
PHOSPHATE SERPL-MCNC: 2.6 MG/DL (ref 2.7–4.5)
PLATELET # BLD AUTO: 324 K/UL (ref 150–450)
PMV BLD AUTO: 10.4 FL (ref 9.2–12.9)
POCT GLUCOSE: 145 MG/DL (ref 70–110)
POCT GLUCOSE: 151 MG/DL (ref 70–110)
POCT GLUCOSE: 86 MG/DL (ref 70–110)
POCT GLUCOSE: 99 MG/DL (ref 70–110)
POTASSIUM SERPL-SCNC: 4.1 MMOL/L (ref 3.5–5.1)
RBC # BLD AUTO: 2.72 M/UL (ref 4–5.4)
SODIUM SERPL-SCNC: 129 MMOL/L (ref 136–145)
WBC # BLD AUTO: 7.86 K/UL (ref 3.9–12.7)

## 2024-11-28 PROCEDURE — 85025 COMPLETE CBC W/AUTO DIFF WBC: CPT | Mod: HCNC | Performed by: FAMILY MEDICINE

## 2024-11-28 PROCEDURE — 25000003 PHARM REV CODE 250: Mod: HCNC | Performed by: FAMILY MEDICINE

## 2024-11-28 PROCEDURE — 25000003 PHARM REV CODE 250: Mod: HCNC

## 2024-11-28 PROCEDURE — 63600175 PHARM REV CODE 636 W HCPCS: Mod: HCNC | Performed by: FAMILY MEDICINE

## 2024-11-28 PROCEDURE — 83735 ASSAY OF MAGNESIUM: CPT | Mod: HCNC | Performed by: FAMILY MEDICINE

## 2024-11-28 PROCEDURE — 21400001 HC TELEMETRY ROOM: Mod: HCNC

## 2024-11-28 PROCEDURE — A4216 STERILE WATER/SALINE, 10 ML: HCPCS | Mod: HCNC | Performed by: ANESTHESIOLOGY

## 2024-11-28 PROCEDURE — 36415 COLL VENOUS BLD VENIPUNCTURE: CPT | Mod: HCNC | Performed by: FAMILY MEDICINE

## 2024-11-28 PROCEDURE — 80069 RENAL FUNCTION PANEL: CPT | Mod: HCNC | Performed by: FAMILY MEDICINE

## 2024-11-28 PROCEDURE — 25000003 PHARM REV CODE 250: Mod: HCNC | Performed by: ANESTHESIOLOGY

## 2024-11-28 PROCEDURE — 25000003 PHARM REV CODE 250: Mod: HCNC | Performed by: NURSE PRACTITIONER

## 2024-11-28 RX ADMIN — Medication: at 03:11

## 2024-11-28 RX ADMIN — ALLOPURINOL 50 MG: 300 TABLET ORAL at 09:11

## 2024-11-28 RX ADMIN — ISOSORBIDE DINITRATE 10 MG: 10 TABLET ORAL at 09:11

## 2024-11-28 RX ADMIN — SERTRALINE HYDROCHLORIDE 100 MG: 50 TABLET ORAL at 09:11

## 2024-11-28 RX ADMIN — ISOSORBIDE DINITRATE 10 MG: 10 TABLET ORAL at 08:11

## 2024-11-28 RX ADMIN — SEVELAMER CARBONATE 800 MG: 800 TABLET, FILM COATED ORAL at 04:11

## 2024-11-28 RX ADMIN — ATORVASTATIN CALCIUM 80 MG: 40 TABLET, FILM COATED ORAL at 09:11

## 2024-11-28 RX ADMIN — HYDROMORPHONE HYDROCHLORIDE 0.5 MG: 1 INJECTION, SOLUTION INTRAMUSCULAR; INTRAVENOUS; SUBCUTANEOUS at 09:11

## 2024-11-28 RX ADMIN — MICONAZOLE NITRATE: 20 OINTMENT TOPICAL at 08:11

## 2024-11-28 RX ADMIN — CLOPIDOGREL BISULFATE 75 MG: 75 TABLET ORAL at 09:11

## 2024-11-28 RX ADMIN — SEVELAMER CARBONATE 800 MG: 800 TABLET, FILM COATED ORAL at 09:11

## 2024-11-28 RX ADMIN — APIXABAN 5 MG: 5 TABLET, FILM COATED ORAL at 09:11

## 2024-11-28 RX ADMIN — Medication 3 ML: at 06:11

## 2024-11-28 RX ADMIN — HYDROMORPHONE HYDROCHLORIDE 0.5 MG: 1 INJECTION, SOLUTION INTRAMUSCULAR; INTRAVENOUS; SUBCUTANEOUS at 08:11

## 2024-11-28 RX ADMIN — Medication 6 MG: at 08:11

## 2024-11-28 RX ADMIN — CALCITRIOL 0.5 MCG: 0.5 CAPSULE, LIQUID FILLED ORAL at 09:11

## 2024-11-28 RX ADMIN — Medication 3 ML: at 12:11

## 2024-11-28 RX ADMIN — PREGABALIN 75 MG: 75 CAPSULE ORAL at 04:11

## 2024-11-28 RX ADMIN — MICONAZOLE NITRATE: 20 OINTMENT TOPICAL at 03:11

## 2024-11-28 RX ADMIN — OXYCODONE AND ACETAMINOPHEN 1 TABLET: 10; 325 TABLET ORAL at 11:11

## 2024-11-28 RX ADMIN — CEFEPIME 1 G: 1 INJECTION, POWDER, FOR SOLUTION INTRAMUSCULAR; INTRAVENOUS at 08:11

## 2024-11-28 RX ADMIN — SEVELAMER CARBONATE 800 MG: 800 TABLET, FILM COATED ORAL at 12:11

## 2024-11-28 RX ADMIN — APIXABAN 5 MG: 5 TABLET, FILM COATED ORAL at 08:11

## 2024-11-28 RX ADMIN — Medication: at 09:11

## 2024-11-28 NOTE — NURSING
During initial assessment nurse discovered wound vac was unplugged and turned off. Per orders I restarted wound vac therapy and resumed setting as ordered suction to 125 continuous. Pt cried out in pain while seroserous fluid, instantly started, to be suctioned through the tube of the wound vac. Nurse reported these findings to charge nurse.

## 2024-11-28 NOTE — ACP (ADVANCE CARE PLANNING)
Vascular Surgery  - NPWT changed at bedside, healthy bleeding appreciated, mild fibrinous exudate was gently removed. Wound care team to take over NPWT mgmt moving forward. Consult placed.   - Please continue w/ Eliquis Plavix  - Follow up in clinic as outpatient, message has been sent to clinic

## 2024-11-28 NOTE — PROGRESS NOTES
Pharmacokinetic Assessment Follow Up: IV Vancomycin    Vancomycin serum concentration assessment(s):  The vancomycin random level, drawn after the redistribution phase post-HD, was 17.9 mcg/mL which is within the goal range of 10-20 mcg/mL.  Patient is ESRD on HD, last session in afternoon on 11/27/24    Vancomycin Regimen Plan:  Will not plan to re-dose vancomycin today - will consider this the pre-HD vanc level for 11/28/24 for re-dosing.  Give a dose of vancomycin  mg post-HD on 11/28/24.  Obtain a vancomycin random level with AM labs prior to HD on 11/30/24.   Monitor for signs and symptoms of nephrotoxicity and infusion reactions.       Drug levels (last 3 results):  Recent Labs   Lab Result Units 11/26/24  0324 11/27/24  1944   Vancomycin, Random ug/mL 17.9 17.9       Pharmacy will continue to follow and monitor vancomycin.    Please contact pharmacy for questions regarding this assessment.    Thank you for the consult,   Jazzmine Soria       Patient brief summary:  Suyapa Connelly is a 59 y.o. female initiated on antimicrobial therapy with IV Vancomycin for treatment of skin & soft tissue infection    The patient's current regimen is post-HD dosing.    Drug Allergies:   Review of patient's allergies indicates:   Allergen Reactions    Ciprofloxacin Itching    Pcn [penicillins]      Rash; tolerated ceftriaxone on 1/13/20  Tolerating Augmentin November 2024       Actual Body Weight:   71.2 kg    Renal Function:   Estimated Creatinine Clearance: 15.5 mL/min (A) (based on SCr of 3 mg/dL (H)).,     Dialysis Method (if applicable):  IHD on TTS - extra session on 11/27    CBC (last 72 hours):  Recent Labs   Lab Result Units 11/25/24  0506 11/26/24  0324 11/27/24  0425   WBC K/uL 7.60 7.57 6.85   Hemoglobin g/dL 7.7* 7.7* 7.4*   Hematocrit % 24.3* 23.4* 23.1*   Platelets K/uL 300 303 280   Gran % % 69.2 69.1 68.2   Lymph % % 9.6* 11.1* 10.8*   Mono % % 14.5 13.6 15.6*   Eosinophil % % 5.4 4.9 3.8   Basophil % %  0.9 0.9 1.2   Differential Method  Automated Automated Automated       Metabolic Panel (last 72 hours):  Recent Labs   Lab Result Units 11/25/24  0506 11/26/24  0324 11/27/24  0425   Sodium mmol/L 126* 123* 127*   Potassium mmol/L 4.0 4.2 4.4   Chloride mmol/L 93* 91* 98   CO2 mmol/L 23 22* 20*   Glucose mg/dL 119* 157* 82   BUN mg/dL 19 22* 13   Creatinine mg/dL 3.3* 4.2* 3.0*   Albumin g/dL 1.9* 1.8* 1.8*   Magnesium mg/dL 1.8 1.8 1.9   Phosphorus mg/dL 3.6 3.8 3.0       Vancomycin Administrations:  vancomycin given in the last 96 hours                     vancomycin 750 mg in 0.9% NaCl 250 mL IVPB (admixture device) (mg) 750 mg New Bag 11/26/24 1430                    Microbiologic Results:  Microbiology Results (last 7 days)       ** No results found for the last 168 hours. **

## 2024-11-29 PROBLEM — M89.9 CHRONIC KIDNEY DISEASE-MINERAL BONE DISORDER (CKD-MBD) WITH STAGE 5 CHRONIC KIDNEY DISEASE, ON CHRONIC DIALYSIS: Status: ACTIVE | Noted: 2024-11-29

## 2024-11-29 PROBLEM — Z99.2 CHRONIC KIDNEY DISEASE-MINERAL BONE DISORDER (CKD-MBD) WITH STAGE 5 CHRONIC KIDNEY DISEASE, ON CHRONIC DIALYSIS: Status: ACTIVE | Noted: 2024-11-29

## 2024-11-29 PROBLEM — E83.9 CHRONIC KIDNEY DISEASE-MINERAL BONE DISORDER (CKD-MBD) WITH STAGE 5 CHRONIC KIDNEY DISEASE, ON CHRONIC DIALYSIS: Status: ACTIVE | Noted: 2024-11-29

## 2024-11-29 PROBLEM — N18.5 CHRONIC KIDNEY DISEASE-MINERAL BONE DISORDER (CKD-MBD) WITH STAGE 5 CHRONIC KIDNEY DISEASE, ON CHRONIC DIALYSIS: Status: ACTIVE | Noted: 2024-11-29

## 2024-11-29 LAB
ALBUMIN SERPL BCP-MCNC: 1.8 G/DL (ref 3.5–5.2)
ANION GAP SERPL CALC-SCNC: 9 MMOL/L (ref 8–16)
AV LVOT MEAN GRADIENT: 1 MMHG
AV LVOT PEAK GRADIENT: 3 MMHG
BASOPHILS # BLD AUTO: 0.09 K/UL (ref 0–0.2)
BASOPHILS NFR BLD: 1.4 % (ref 0–1.9)
BSA FOR ECHO PROCEDURE: 1.55 M2
BUN SERPL-MCNC: 13 MG/DL (ref 6–20)
CALCIUM SERPL-MCNC: 8.3 MG/DL (ref 8.7–10.5)
CHLORIDE SERPL-SCNC: 95 MMOL/L (ref 95–110)
CO2 SERPL-SCNC: 23 MMOL/L (ref 23–29)
CREAT SERPL-MCNC: 3 MG/DL (ref 0.5–1.4)
CV ECHO LV RWT: 0.26 CM
DIFFERENTIAL METHOD BLD: ABNORMAL
DOP CALC LVOT PEAK VEL: 0.8 M/S
DOP CALCLVOT PEAK VEL VTI: 18.4 CM
E WAVE DECELERATION TIME: 201.05 MS
E/A RATIO: 1.19
E/E' RATIO: 10.25 M/S
ECHO EF ESTIMATED: 46 %
ECHO LV POSTERIOR WALL: 0.7 CM (ref 0.6–1.1)
EJECTION FRACTION: 35 %
EOSINOPHIL # BLD AUTO: 0.2 K/UL (ref 0–0.5)
EOSINOPHIL NFR BLD: 3 % (ref 0–8)
ERYTHROCYTE [DISTWIDTH] IN BLOOD BY AUTOMATED COUNT: 20 % (ref 11.5–14.5)
EST. GFR  (NO RACE VARIABLE): 17.5 ML/MIN/1.73 M^2
FRACTIONAL SHORTENING: 24.1 % (ref 28–44)
GLUCOSE SERPL-MCNC: 95 MG/DL (ref 70–110)
HCT VFR BLD AUTO: 25.1 % (ref 37–48.5)
HGB BLD-MCNC: 7.6 G/DL (ref 12–16)
IMM GRANULOCYTES # BLD AUTO: 0.03 K/UL (ref 0–0.04)
IMM GRANULOCYTES NFR BLD AUTO: 0.5 % (ref 0–0.5)
INTERVENTRICULAR SEPTUM: 0.8 CM (ref 0.6–1.1)
IVC DIAMETER: 3.5 CM
LA MAJOR: 6.04 CM
LA MINOR: 6.07 CM
LA WIDTH: 5.16 CM
LEFT ATRIUM SIZE: 4.27 CM
LEFT ATRIUM VOLUME INDEX MOD: 69.3 ML/M2
LEFT ATRIUM VOLUME INDEX: 79.3 ML/M2
LEFT ATRIUM VOLUME MOD: 99.12 ML
LEFT ATRIUM VOLUME: 113.4 CM3
LEFT INTERNAL DIMENSION IN SYSTOLE: 4.1 CM (ref 2.1–4)
LEFT VENTRICLE DIASTOLIC VOLUME INDEX: 97.48 ML/M2
LEFT VENTRICLE DIASTOLIC VOLUME: 139.39 ML
LEFT VENTRICLE MASS INDEX: 99.9 G/M2
LEFT VENTRICLE SYSTOLIC VOLUME INDEX: 52.4 ML/M2
LEFT VENTRICLE SYSTOLIC VOLUME: 74.88 ML
LEFT VENTRICULAR INTERNAL DIMENSION IN DIASTOLE: 5.4 CM (ref 3.5–6)
LEFT VENTRICULAR MASS: 142.9 G
LV LATERAL E/E' RATIO: 8.2
LV SEPTAL E/E' RATIO: 13.67
LYMPHOCYTES # BLD AUTO: 0.8 K/UL (ref 1–4.8)
LYMPHOCYTES NFR BLD: 11.5 % (ref 18–48)
MAGNESIUM SERPL-MCNC: 2 MG/DL (ref 1.6–2.6)
MCH RBC QN AUTO: 29.9 PG (ref 27–31)
MCHC RBC AUTO-ENTMCNC: 30.3 G/DL (ref 32–36)
MCV RBC AUTO: 99 FL (ref 82–98)
MONOCYTES # BLD AUTO: 1 K/UL (ref 0.3–1)
MONOCYTES NFR BLD: 15.2 % (ref 4–15)
MV PEAK A VEL: 0.69 M/S
MV PEAK E VEL: 0.82 M/S
NEUTROPHILS # BLD AUTO: 4.5 K/UL (ref 1.8–7.7)
NEUTROPHILS NFR BLD: 68.4 % (ref 38–73)
NRBC BLD-RTO: 0 /100 WBC
OHS CV RV/LV RATIO: 1.06 CM
PHOSPHATE SERPL-MCNC: 3.1 MG/DL (ref 2.7–4.5)
PISA TR MAX VEL: 1.32 M/S
PLATELET # BLD AUTO: 323 K/UL (ref 150–450)
PMV BLD AUTO: 10.3 FL (ref 9.2–12.9)
POCT GLUCOSE: 101 MG/DL (ref 70–110)
POCT GLUCOSE: 134 MG/DL (ref 70–110)
POCT GLUCOSE: 150 MG/DL (ref 70–110)
POCT GLUCOSE: 87 MG/DL (ref 70–110)
POTASSIUM SERPL-SCNC: 4.1 MMOL/L (ref 3.5–5.1)
RA MAJOR: 5.88 CM
RA PRESSURE ESTIMATED: 15 MMHG
RA WIDTH: 5.19 CM
RBC # BLD AUTO: 2.54 M/UL (ref 4–5.4)
RIGHT ATRIAL AREA: 22.8 CM2
RIGHT VENTRICLE DIASTOLIC BASEL DIMENSION: 5.7 CM
RV TB RVSP: 16 MMHG
SODIUM SERPL-SCNC: 127 MMOL/L (ref 136–145)
TDI LATERAL: 0.1 M/S
TDI SEPTAL: 0.06 M/S
TDI: 0.08 M/S
TR MAX PG: 7 MMHG
TRICUSPID ANNULAR PLANE SYSTOLIC EXCURSION: 0.87 CM
TV PEAK GRADIENT: 7 MMHG
TV REST PULMONARY ARTERY PRESSURE: 22 MMHG
VANCOMYCIN SERPL-MCNC: 16.4 UG/ML
WBC # BLD AUTO: 6.63 K/UL (ref 3.9–12.7)
Z-SCORE OF LEFT VENTRICULAR DIMENSION IN END DIASTOLE: 2.12
Z-SCORE OF LEFT VENTRICULAR DIMENSION IN END SYSTOLE: 3.27

## 2024-11-29 PROCEDURE — 63600175 PHARM REV CODE 636 W HCPCS: Mod: JG,HCNC | Performed by: NURSE PRACTITIONER

## 2024-11-29 PROCEDURE — 25000003 PHARM REV CODE 250: Mod: HCNC

## 2024-11-29 PROCEDURE — 90935 HEMODIALYSIS ONE EVALUATION: CPT | Mod: HCNC,,, | Performed by: NURSE PRACTITIONER

## 2024-11-29 PROCEDURE — A4216 STERILE WATER/SALINE, 10 ML: HCPCS | Mod: HCNC | Performed by: ANESTHESIOLOGY

## 2024-11-29 PROCEDURE — 80202 ASSAY OF VANCOMYCIN: CPT | Mod: HCNC | Performed by: FAMILY MEDICINE

## 2024-11-29 PROCEDURE — 90935 HEMODIALYSIS ONE EVALUATION: CPT | Mod: HCNC

## 2024-11-29 PROCEDURE — 83735 ASSAY OF MAGNESIUM: CPT | Mod: HCNC | Performed by: FAMILY MEDICINE

## 2024-11-29 PROCEDURE — 63600175 PHARM REV CODE 636 W HCPCS: Mod: HCNC

## 2024-11-29 PROCEDURE — 63600175 PHARM REV CODE 636 W HCPCS: Mod: HCNC | Performed by: FAMILY MEDICINE

## 2024-11-29 PROCEDURE — A4216 STERILE WATER/SALINE, 10 ML: HCPCS | Mod: HCNC

## 2024-11-29 PROCEDURE — 85025 COMPLETE CBC W/AUTO DIFF WBC: CPT | Mod: HCNC | Performed by: FAMILY MEDICINE

## 2024-11-29 PROCEDURE — 80069 RENAL FUNCTION PANEL: CPT | Mod: HCNC | Performed by: FAMILY MEDICINE

## 2024-11-29 PROCEDURE — 25000003 PHARM REV CODE 250: Mod: HCNC | Performed by: ANESTHESIOLOGY

## 2024-11-29 PROCEDURE — 21400001 HC TELEMETRY ROOM: Mod: HCNC

## 2024-11-29 PROCEDURE — 25000003 PHARM REV CODE 250: Mod: HCNC | Performed by: NURSE PRACTITIONER

## 2024-11-29 PROCEDURE — 25000003 PHARM REV CODE 250: Mod: HCNC | Performed by: FAMILY MEDICINE

## 2024-11-29 PROCEDURE — P9047 ALBUMIN (HUMAN), 25%, 50ML: HCPCS | Mod: JZ,JG,HCNC | Performed by: NURSE PRACTITIONER

## 2024-11-29 RX ORDER — DEXTROSE 4 G
TABLET,CHEWABLE ORAL
Start: 2024-11-29 | End: 2025-11-29

## 2024-11-29 RX ORDER — ALBUMIN HUMAN 250 G/1000ML
25 SOLUTION INTRAVENOUS ONCE
Status: COMPLETED | OUTPATIENT
Start: 2024-11-29 | End: 2024-11-29

## 2024-11-29 RX ORDER — SODIUM CHLORIDE 9 MG/ML
INJECTION, SOLUTION INTRAVENOUS ONCE
Status: COMPLETED | OUTPATIENT
Start: 2024-11-29 | End: 2024-11-29

## 2024-11-29 RX ORDER — PEN NEEDLE, DIABETIC 33 GX5/32"
1 NEEDLE, DISPOSABLE MISCELLANEOUS
Start: 2024-11-29

## 2024-11-29 RX ADMIN — ALBUMIN (HUMAN) 25 G: 12.5 SOLUTION INTRAVENOUS at 09:11

## 2024-11-29 RX ADMIN — CLOPIDOGREL BISULFATE 75 MG: 75 TABLET ORAL at 01:11

## 2024-11-29 RX ADMIN — VANCOMYCIN HYDROCHLORIDE 750 MG: 750 INJECTION, POWDER, LYOPHILIZED, FOR SOLUTION INTRAVENOUS at 02:11

## 2024-11-29 RX ADMIN — Medication 3 ML: at 06:11

## 2024-11-29 RX ADMIN — EPOETIN ALFA-EPBX 3560 UNITS: 4000 INJECTION, SOLUTION INTRAVENOUS; SUBCUTANEOUS at 11:11

## 2024-11-29 RX ADMIN — Medication 3 ML: at 05:11

## 2024-11-29 RX ADMIN — APIXABAN 5 MG: 5 TABLET, FILM COATED ORAL at 01:11

## 2024-11-29 RX ADMIN — APIXABAN 5 MG: 5 TABLET, FILM COATED ORAL at 08:11

## 2024-11-29 RX ADMIN — SERTRALINE HYDROCHLORIDE 100 MG: 50 TABLET ORAL at 01:11

## 2024-11-29 RX ADMIN — Medication 10 ML: at 08:11

## 2024-11-29 RX ADMIN — Medication 6 MG: at 08:11

## 2024-11-29 RX ADMIN — ISOSORBIDE DINITRATE 10 MG: 10 TABLET ORAL at 08:11

## 2024-11-29 RX ADMIN — HYDROMORPHONE HYDROCHLORIDE 0.5 MG: 1 INJECTION, SOLUTION INTRAMUSCULAR; INTRAVENOUS; SUBCUTANEOUS at 04:11

## 2024-11-29 RX ADMIN — PREGABALIN 75 MG: 75 CAPSULE ORAL at 04:11

## 2024-11-29 RX ADMIN — Medication: at 08:11

## 2024-11-29 RX ADMIN — Medication 3 ML: at 12:11

## 2024-11-29 RX ADMIN — Medication: at 04:11

## 2024-11-29 RX ADMIN — OXYCODONE AND ACETAMINOPHEN 1 TABLET: 10; 325 TABLET ORAL at 08:11

## 2024-11-29 RX ADMIN — OXYCODONE AND ACETAMINOPHEN 1 TABLET: 10; 325 TABLET ORAL at 12:11

## 2024-11-29 RX ADMIN — MICONAZOLE NITRATE: 20 OINTMENT TOPICAL at 04:11

## 2024-11-29 RX ADMIN — MICONAZOLE NITRATE: 20 OINTMENT TOPICAL at 08:11

## 2024-11-29 RX ADMIN — HEPARIN SODIUM 1000 UNITS: 1000 INJECTION, SOLUTION INTRAVENOUS; SUBCUTANEOUS at 11:11

## 2024-11-29 RX ADMIN — CEFEPIME 1 G: 1 INJECTION, POWDER, FOR SOLUTION INTRAMUSCULAR; INTRAVENOUS at 09:11

## 2024-11-29 RX ADMIN — ATORVASTATIN CALCIUM 80 MG: 40 TABLET, FILM COATED ORAL at 01:11

## 2024-11-29 RX ADMIN — SODIUM CHLORIDE: 9 INJECTION, SOLUTION INTRAVENOUS at 08:11

## 2024-11-29 RX ADMIN — CALCITRIOL 0.5 MCG: 0.5 CAPSULE, LIQUID FILLED ORAL at 01:11

## 2024-11-29 RX ADMIN — CINACALCET HYDROCHLORIDE 30 MG: 30 TABLET, FILM COATED ORAL at 04:11

## 2024-11-29 NOTE — PROCEDURE NOTE ADDENDUM
[facility] NOTE     Patient currently on hemodialysis for removal of uremic toxins and volume.     Patient seen and evaluated on hemodialysis, tolerating treatment, see HD flowsheet for vitals and assessments.      Ultrafiltration goal is 2L     Labs have been reviewed and the dialysate bath has been adjusted.     Assessment/Plan:  Seen on HD, hypervolemic.  Albumin 1.5.  Labile blood pressures.  Oxygenating well on RA  Will plan to give albumin 25g IV x 1 with HD today  Discussed with primary team, possible discharge tomorrow pending wounvac.    Will plan for HD tomorrow to place on pts OP schedule in anticipation of discharge  EPO with HD  Phos low/normal.  Will hold phos binders    SAMMY Mae, FNP-BC  Nephrology  Pager:  [phone]

## 2024-11-29 NOTE — ASSESSMENT & PLAN NOTE
ID consulted:  Cont vanc/cefepime.  EOT 12/01/2024.  Will need to contact HD to make sure antibiotics can be administered during treatment.  Blood cultures NGTD  Pain control  Wound VAC change 11/27.  Wound care team to manage VAC moving forward.  Will need home wound VAC.

## 2024-11-29 NOTE — PLAN OF CARE
As per jessica, pt accepted to [facility] to be admitted 12/02.       11/29/24 1313   Post-Acute Status   Post-Acute Authorization Home [facility]   Home Health Status Referrals Sent   Discharge Delays None known at this time   Discharge Plan   Discharge Plan A Home Health     Spoke with spouse Randell to review discharge recommendation of home health and is agreeable to plan    Patient/family provided list of facilities in-network with patient's payor plan. Providers that are owned, operated, or affiliated with [facility] are included on the list.     Notified that referral sent to below listed facilities from in-network list based on proximity to home/family support:   [facility]  2. [facility]  3. [facility]  4. [facility]  5. (can send more than 5)    Patient/family instructed to identify preference.    Preferred Facility: (if more than 1, listed in order of descending preference)  No preference    If an additional preferred facility not listed above is identified, additional referral to be sent. If above facilities unable to accept, will send additional referrals to in-network providers.     Discharge Plan A and Plan B have been determined by review of patient's clinical status, future medical and therapeutic needs, and coverage/benefits for post-acute care in coordination with multidisciplinary team members.    Hazel Hart, ERIC, MSW, LMSW, RSW   Case Management  [facility]  Email: [email or web address]

## 2024-11-29 NOTE — SUBJECTIVE & OBJECTIVE
Interval History:  Pt reports LLE discomfort once wound VAC was turned on this morning.  Denies any chest pain, SOB, abdominal pain, nausea, vomiting.  Will need to obtain home wound VAC and confirm outpatient IV antibiotics at HD prior to discharge.    Labs personally reviewed: Hb 8.4, Na 129, K 4.1, CO2 19, Cr 2.3    Vascular surgery consulted:  Wound VAC change evening of 11/27.  Wound care to take over vac management.    Objective:     Vital Signs (Most Recent):  Temp: 97.9 °F (36.6 °C) (11/28/24 2012)  Pulse: 83 (11/28/24 2012)  Resp: 18 (11/28/24 2012)  BP: 120/75 (11/28/24 2012)  SpO2: 95 % (11/28/24 2012) Vital Signs (24h Range):  Temp:  [97.5 °F (36.4 °C)-98.4 °F (36.9 °C)] 97.9 °F (36.6 °C)  Pulse:  [78-87] 83  Resp:  [15-20] 18  SpO2:  [92 %-98 %] 95 %  BP: (106-126)/(64-81) 120/75     Weight: 71.1 kg (156 lb 12 oz)  Body mass index is 47.83 kg/m².  No intake or output data in the 24 hours ending 11/28/24 2014        Physical Exam  Cardiovascular:      Rate and Rhythm: Normal rate and regular rhythm.   Pulmonary:      Effort: No respiratory distress.      Breath sounds: Normal breath sounds.   Abdominal:      General: Bowel sounds are normal.      Palpations: Abdomen is soft.   Musculoskeletal:      Cervical back: Normal range of motion.      Left lower leg: Edema present.      Comments: Wound vac is in place on stump draining serosanguineous fluid.   Neurological:      General: No focal deficit present.      Mental Status: She is alert.

## 2024-11-29 NOTE — ASSESSMENT & PLAN NOTE
TTE 05/2024 EF 15% with diastolic dysfunction.  RV systolic function mildly reduced.  RV wall motion global hypokinesis.  Severe TR.    TTE mentions regional WMA but does not mention EF.  RV function severely reduced..  Severe TR.  Repeat echo pending.  Cont Isordil.  Coreg and hydralazine held, resume when BP will tolerate.  Monitor on telemetry, low-Na diet, fluid restriction.  Volume managed with HD  Will need outpatient cardiology follow up

## 2024-11-29 NOTE — ASSESSMENT & PLAN NOTE
Postoperative anemia  Goal directed resuscitation, transfuse for Hgb<7  2 units PRBCs transfused total:  11/23 and 11/24  Asymptomatic  Hb stable

## 2024-11-29 NOTE — PLAN OF CARE
Problem: Hemodialysis  Goal: Safe, Effective Therapy Delivery  Outcome: Progressing     Problem: Chronic Kidney Disease  Goal: Electrolyte Balance  Outcome: Progressing  Goal: Fluid Balance  Outcome: Progressing

## 2024-11-29 NOTE — ASSESSMENT & PLAN NOTE
- Transfuse PRBC if patient becomes hemodynamically unstable, symptomatic or H/H drops below 7/21.  - Patient had PRBC 1 unit 11/23 and 11/24  - HH is remains stable at this time with no sign of bleeding,

## 2024-11-29 NOTE — PLAN OF CARE
Andrew jose [facility] - [facility] Surg  Discharge Reassessment    Primary Care Provider: Vanessa Noel MD    Expected Discharge Date: 11/29/2024    Pt will be returning home with home health and a wound vac from [facility].      SW spoke to spouse Randell [phone] and explained the d/c plan to the spouse and he is amenable    Reassessment (most recent)       Discharge Reassessment - 11/29/24 1244          Discharge Reassessment    Assessment Type Discharge Planning Reassessment     Did the patient's condition or plan change since previous assessment? Yes     Discharge Plan discussed with: Patient;Spouse/sig other     Name(s) and Number(s) Randell [phone]     Discharge Plan A Home Health;Home     Discharge Plan B Home;Home Health     DME Needed Upon Discharge  other (see comments)   wound vac    Transition of Care Barriers None (P)         Post-Acute Status    Post-Acute Authorization Home Health (P)      Home Health Status Referrals Sent (P)      Discharge Delays None known at this time (P)                    Discharge Plan A and Plan B have been determined by review of patient's clinical status, future medical and therapeutic needs, and coverage/benefits for post-acute care in coordination with multidisciplinary team members.    Hazel Hart, ERIC, MSW, LMSW, RSW   Case Management  [facility]  Email: [email or web address]

## 2024-11-29 NOTE — PLAN OF CARE
SW attempted 2 times to contact spouse Randell [phone] about pt DEVAN; spouse does not have voicemail set up and message could not be left.    SW was able to contact spouse Randell and updated him on the d/c disposition and waiting for the wound vac and [facility]      Hazel Hart, ERIC, MSW, LMSW, RSW   Case Management  [facility]  Email: [email or web address]

## 2024-11-29 NOTE — PROGRESS NOTES
1145 HD treatment completed, removed UF fluid of 2 L for 3.5 hrs, episode of decreased BP during the treatment, Nephrology team made aware, Albumin 25g/100 ml ordered and given, Blood sugar checked and recorded. Pt tolerated treatment. L HD catheter were flushed and heparin locked. , VSS and recorderd. Report called to primary nurse DEANGELO Gillespie.    1232 Patient departed [facility] with tele box and wound vac via bed by patient transport.   Outpatient Medications Marked as Taking for the 10/28/19 encounter (Refill) with Luis Daniel Shipman MD   Medication Sig Dispense Refill   • amphetamine-dextroamphetamine XR (ADDERALL XR) 10 MG 24 hr capsule Take 1 capsule by mouth 2 times daily. 60 capsule 0   • lamoTRIgine (LAMICTAL) 25 MG tablet Take 1 tablet by mouth 2 times daily. For 2 weeks then increase to 2 tabs twice daily there after 120 tablet 2        Patients mother states the patient is completely out of his adderall and would like refilled ASAP.       Ok to leave detailed Message: No  Informed caller of refill policy- 24-48 hours: Yes  No call back needed unless nurse has questions.     Pharmacy: [facility] [id number] [facility] [address] [facility]

## 2024-11-29 NOTE — PROGRESS NOTES
Pharmacokinetic Assessment Follow Up: IV Vancomycin    Vancomycin serum concentration assessment(s)/plan:    - The random level was drawn correctly and can be used to guide therapy at this time.   - The measurement is within the desired definitive target range of 10 to 20 mcg/mL.  - Pt received dialysis 11/29. Will order vancomycin 750mg IV x1   - Nephro planning HD 11/30, next level ordered 11/30 with am labs  - End of therapy 12/01 per ID recs, last vancomycin dose 11/30 if level is appropriate for re-dosing    Drug levels (last 3 results):  Recent Labs   Lab Result Units 11/27/24  1944 11/29/24  0437   Vancomycin, Random ug/mL 17.9 16.4       Pharmacy will continue to follow and monitor vancomycin.    Please contact pharmacy at extension [phone] for questions regarding this assessment.    Thank you for the consult,   Neftlai Barber       Patient brief summary:  Suyapa Connelly is a 59 y.o. female initiated on antimicrobial therapy with IV Vancomycin for treatment of skin & soft tissue infection    Drug Allergies:   Review of patient's allergies indicates:   Allergen Reactions    Ciprofloxacin Itching    Pcn [penicillins]      Rash; tolerated ceftriaxone on 1/13/20  Tolerating Augmentin November 2024       Actual Body Weight:   71.1 kg    Renal Function:   Estimated Creatinine Clearance: 15.4 mL/min (A) (based on SCr of 3 mg/dL (H)).,     Dialysis Method (if applicable):  intermittent HD

## 2024-11-29 NOTE — PLAN OF CARE
[facility] - [facility]      HOME HEALTH ORDERS  FACE TO FACE ENCOUNTER    Patient Name: Suyapa Connelly  YOB: 1965    PCP: Vanessa Noel MD   PCP Address: [address] [place] / [place] [address]  PCP Phone Number: [phone]  PCP Fax: [phone]    Encounter Date: 11/15/24    Admit to Home Health    Diagnoses:  Active Hospital Problems    Diagnosis  POA    *Cellulitis of left thigh [L03.116]  Yes     Priority: 1 - High    Peripheral vascular disease [I73.9]  Yes     Priority: 1 - High    Anemia due to chronic kidney disease, on chronic dialysis [N18.6, D63.1, Z99.2]  Not Applicable     Priority: 5     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, on chronic dialysis [N18.5, E83.9, Z99.2, M89.9]  Not Applicable    Postoperative anemia [D64.9]  No    Atherosclerosis of native coronary artery of native heart with angina pectoris [I25.119]  Yes    Severe obesity (BMI >= 40) [E66.01]  Yes     BMI 45      ESRD (end stage renal disease) [N18.6]  Yes    Chronic combined systolic and diastolic congestive heart failure [I50.42]  Yes    Hyponatremia [E87.1]  Yes    Paroxysmal atrial fibrillation [I48.0]  Yes    Type 2 diabetes mellitus with hyperglycemia, with long-term current use of insulin [E11.65, Z79.4]  Not Applicable    CAD (coronary artery disease) [I25.10]  Yes     Formatting of this note might be different from the original.  Last Assessment & Plan:   Formatting of this note might be different from the original.  -- Optimize glucose control.      CAROLIN (generalized anxiety disorder) [F41.1]  Yes      Resolved Hospital Problems   No resolved problems to display.       Follow Up Appointments:  Future Appointments   Date Time Provider Department Center   12/9/2024 10:30 AM Kareem Ruelas NP [facility]       Allergies:  Review of patient's allergies indicates:   Allergen Reactions    Ciprofloxacin Itching    Pcn [penicillins]      Rash;  tolerated ceftriaxone on 1/13/20  Tolerating Augmentin November 2024       Medications: Review discharge medications with patient and family and provide education.    Current Facility-Administered Medications   Medication Dose Route Frequency Provider Last Rate Last Admin    acetaminophen tablet 1,000 mg  1,000 mg Oral Q8H PRN Cody Traore DO   1,000 mg at 11/27/24 2203    allopurinol split tablet 50 mg  50 mg Oral Every other day Cody Traore DO   50 mg at 11/28/24 0929    apixaban tablet 5 mg  5 mg Oral BID Dimitris Rojo III, MD   5 mg at 11/29/24 1300    atorvastatin tablet 80 mg  80 mg Oral Daily Cody Traore DO   80 mg at 11/29/24 1300    balsam peru-castor oiL Oint   Topical (Top) BID Dimitris Rojo III, MD   Given at 11/28/24 2100    calcitRIOL capsule 0.5 mcg  0.5 mcg Oral Daily Cody Traore DO   0.5 mcg at 11/29/24 1301    ceFEPIme injection 1 g  1 g Intravenous Q24H Dimitris Rojo III, MD   1 g at 11/28/24 2035    cinacalcet tablet 30 mg  30 mg Oral Every Mon, Wed, Fri Cody Traore DO   30 mg at 11/27/24 1633    clopidogreL tablet 75 mg  75 mg Oral Daily Cody Traore DO   75 mg at 11/29/24 1301    dextrose 10% bolus 125 mL 125 mL  12.5 g Intravenous PRN Cody Traore DO        dextrose 10% bolus 250 mL 250 mL  25 g Intravenous PRN Cody Traore DO        epoetin nina-epbx injection 3,560 Units  50 Units/kg Intravenous Every Mon, Wed, Fri Agustin Rai NP   3,560 Units at 11/29/24 1140    glucagon (human recombinant) injection 1 mg  1 mg Intramuscular PRN Cody Traore DO        glucose chewable tablet 16 g  16 g Oral PRN Cody Traore DO        glucose chewable tablet 24 g  24 g Oral PRN Cody Traore DO        heparin (porcine) injection 1,000 Units  1,000 Units Intra-Catheter PRN Earnestine Morel PA-C   1,000 Units at 11/29/24 1144    HYDROmorphone injection 0.5 mg  0.5 mg Intravenous Q4H PRN Dimitris Rojo III, MD   0.5 mg at 11/28/24  2145    insulin aspart U-100 pen 0-5 Units  0-5 Units Subcutaneous QID (AC + HS) PRN Cody Traore DO   2 Units at 11/24/24 0804    isosorbide dinitrate tablet 10 mg  10 mg Oral BID Cody Traore DO   10 mg at 11/28/24 2035    melatonin tablet 6 mg  6 mg Oral Nightly Dimitris Rojo III, MD   6 mg at 11/28/24 2035    miconazole nitrate 2% ointment   Topical (Top) BID Dimitris Rojo III, MD   Given at 11/28/24 2036    naloxone 0.4 mg/mL injection 0.02 mg  0.02 mg Intravenous PRN Cody Traore DO        oxyCODONE-acetaminophen  mg per tablet 1 tablet  1 tablet Oral Q4H PRN Dimitris Rojo III, MD   1 tablet at 11/29/24 1256    pregabalin capsule 75 mg  75 mg Oral Daily Cody Traore DO   75 mg at 11/28/24 1657    sertraline tablet 100 mg  100 mg Oral Daily Cody Traore DO   100 mg at 11/29/24 1302    sodium chloride 0.9% flush 10 mL  10 mL Intravenous Q12H PRN Cody Traore DO   10 mL at 11/25/24 0600    sodium chloride 0.9% flush 3 mL  3 mL Intravenous Q6H Bharath Dunn Jr., MD   3 mL at 11/29/24 0536    vancomycin - pharmacy to dose   Intravenous pharmacy to manage frequency Cody Traore DO        vancomycin 750 mg in 0.9% NaCl 250 mL IVPB (admixture device)  750 mg Intravenous Once Dimitris Rojo III, MD            Medication List        CHANGE how you take these medications      blood sugar diagnostic Strp  1 each by Misc.(Non-Drug; Combo Route) route 4 (four) times daily before meals and nightly.  What changed:   when to take this  Another medication with the same name was removed. Continue taking this medication, and follow the directions you see here.     blood-glucose meter Misc  Before meals and at bedtime  What changed: additional instructions     carvediloL 6.25 MG tablet  Commonly known as: COREG  Take 0.5 tablets (3.125 mg total) by mouth 2 (two) times daily.  Notes to patient: HOLD THIS MEDICATION UNTIL FOLLOW UP WITH PCP OR NEPHROLOGY     hydrALAZINE  "10 MG tablet  Commonly known as: APRESOLINE  Take 1 tablet (10 mg total) by mouth every 12 (twelve) hours.  Notes to patient: HOLD THIS MEDICATION UNTIL FOLLOW UP WITH PCP OR NEPHROLOGY     pen needle, diabetic 33 gauge x 5/32" Ndle  1 each by Misc.(Non-Drug; Combo Route) route 4 (four) times daily before meals and nightly.  What changed:   when to take this  Another medication with the same name was removed. Continue taking this medication, and follow the directions you see here.     sodium bicarbonate 650 MG tablet  Take 1 tablet (650 mg total) by mouth 3 (three) times daily.  Notes to patient: HOLD THIS MEDICATION UNTIL FOLLOW UP WITH NEPHROLOGY            CONTINUE taking these medications      acetaminophen 325 MG tablet  Commonly known as: TYLENOL  Take 2 tablets (650 mg total) by mouth every 8 (eight) hours as needed for Pain.     allopurinoL 100 MG tablet  Commonly known as: ZYLOPRIM  Take 0.5 tablets (50 mg total) by mouth every other day.     apixaban 5 mg Tab  Commonly known as: ELIQUIS  Take 1 tablet (5 mg total) by mouth 2 (two) times daily.     atorvastatin 80 MG tablet  Commonly known as: LIPITOR  Take 1 tablet (80 mg total) by mouth once daily.     calcitRIOL 0.5 MCG Cap  Commonly known as: ROCALTROL  Take 1 capsule (0.5 mcg total) by mouth once daily.     cinacalcet 30 MG Tab  Commonly known as: SENSIPAR  Take 1 tablet (30 mg total) by mouth every Mon, Wed, Fri.     clopidogreL 75 mg tablet  Commonly known as: PLAVIX  Take 1 tablet (75 mg total) by mouth once daily.     DEXCOM G7 SENSOR Radha  Generic drug: blood-glucose sensor  1 each by Misc.(Non-Drug; Combo Route) route once daily.     epoetin nina 4,000 unit/mL injection  Commonly known as: PROCRIT  Inject 1 mL (4,000 Units total) into the skin every Mon, Wed, Fri.     famotidine 20 MG tablet  Commonly known as: PEPCID  Take 1 tablet (20 mg total) by mouth once daily.     insulin aspart U-100 100 unit/mL (3 mL) Inpn pen  Commonly known as: " NovoLOG  Inject 0-10 Units into the skin before meals and at bedtime as needed (Hyperglycemia). **MODERATE CORRECTION DOSE**  Blood Glucose  mg/dL                  Pre-meal                2200  151-200                2 units                    1 unit  201-250                4 units                    2 units  251-300                6 units                    3 units  301-350                8 units                    4 units  >350                     10 units                  5 units  Administer subcutaneously if needed at times designated by monitoring  schedule.     insulin glargine U-100 (Lantus) 100 unit/mL (3 mL) Inpn pen  Inject 5 Units into the skin every evening.     isosorbide dinitrate 10 MG tablet  Commonly known as: ISORDIL  Take 1 tablet (10 mg total) by mouth 2 (two) times daily.     Lactobacillus rhamnosus GG 10 billion cell capsule  Commonly known as: CULTURELLE  Take 1 capsule by mouth once daily.     lancets 33 gauge Misc  TEST 3 TIMES DAILY     oxyCODONE-acetaminophen 5-325 mg per tablet  Commonly known as: PERCOCET  Take 1 tablet by mouth every 8 (eight) hours as needed for Pain.     pregabalin 75 MG capsule  Commonly known as: LYRICA  Take 1 capsule (75 mg total) by mouth Daily.     sertraline 100 MG tablet  Commonly known as: ZOLOFT  Take 1 tablet (100 mg total) by mouth once daily.     vitamin renal formula (B-complex-vitamin c-folic acid) 1 mg Cap  Commonly known as: NEPHROCAP  Take 1 capsule by mouth once daily.            STOP taking these medications      amoxicillin-clavulanate 500-125mg 500-125 mg Tab  Commonly known as: AUGMENTIN     doxycycline 100 MG tablet  Commonly known as: VIBRA-TABS                I have seen and examined this patient within the last 30 days. My clinical findings that support the need for the home health skilled services and home bound status are the following:no   Weakness/numbness causing balance and gait disturbance due to Infection and Weakness/Debility making  it taxing to leave home.     Diet:   diabetic diet 2000 calorie and renal diet    Labs:  To be done at dialysis TID    Referrals/ Consults  Physical Therapy to evaluate and treat. Evaluate for home safety and equipment needs; Establish/upgrade home exercise program. Perform / instruct on therapeutic exercises, gait training, transfer training, and Range of Motion.  Occupational Therapy to evaluate and treat. Evaluate home environment for safety and equipment needs. Perform/Instruct on transfers, ADL training, ROM, and therapeutic exercises.    Activities:   activity as tolerated    Nursing:   Agency to admit patient within 24 hours of hospital discharge unless specified on physician order or at patient request    SN to complete comprehensive assessment including routine vital signs. Instruct on disease process and s/s of complications to report to MD. Review/verify medication list sent home with the patient at time of discharge  and instruct patient/caregiver as needed. Frequency may be adjusted depending on start of care date.     Skilled nurse to perform up to 3 visits PRN for symptoms related to diagnosis    Notify MD if SBP > 160 or < 90; DBP > 90 or < 50; HR > 120 or < 50; Temp > 101; O2 < 88%    Ok to schedule additional visits based on staff availability and patient request on consecutive days within the home health episode.    When multiple disciplines ordered:    Start of Care occurs on Sunday - Wednesday schedule remaining discipline evaluations as ordered on separate consecutive days following the start of care.    Thursday SOC -schedule subsequent evaluations Friday and Monday the following week.     Friday - Saturday SOC - schedule subsequent discipline evaluations on consecutive days starting Monday of the following week.    For all post-discharge communication and subsequent orders please contact patient's primary care physician.     Miscellaneous   Routine Skin for Bedridden Patients: Instruct  patient/caregiver to apply moisture barrier cream to all skin folds and wet areas in perineal area daily and after baths and all bowel movements.    PICC Line Care: PICC Line Use/Care Instructions:  Scrub the Hub: Prior to accessing the line, always perform a 30 second alcohol scrub  Each lumen of the central line is to be flushed at least daily with 10 mL Normal Saline and 3 mL Heparin flush (10 units/mL)  Skilled Nurse (SN) may draw blood from IV access  Blood Draw Procedure:  - Aspirate at least 5 mL of blood  - Discard  - Obtain specimen  - Change injection cap  - Flush with 20 mL Normal Saline followed by a 3-5 mL Heparin flush (10 units/mL)    Central :  - Sterile dressing changes are done weekly and as needed.  - Use chlor-hexadine scrub to cleanse site, apply Biopatch to insertion site, apply securement device dressing  - Injection caps are changed weekly and after EVERY lab draw.  - If sterile gauze is under dressing to control oozing, dressing change must be performed every 24 hours until gauze is not needed.    Home Health Aide:  Nursing Three times weekly, Physical Therapy Three times weekly, and Occupational Therapy Three times weekly    Wound Care Orders  yes:  Wound Vac:   Location:  Left upper leg, prior amputation site.           Dressing changes twice per week.         ET Consult    I certify that this patient is confined to her home and needs intermittent skilled nursing care, physical therapy, and occupational therapy.

## 2024-11-29 NOTE — PROGRESS NOTES
0730 Patient arrived [facility] via bed, AOX4. V    0757 HD treatment started via L chest subclavian catheter, accessed with good blood flow, VSS and recorded, NAD.

## 2024-11-29 NOTE — PROGRESS NOTES
[facility] Medicine  Progress Note    Patient Name: Suyapa Connelly  MRN: [id number]  Patient Class: IP- Inpatient   Admission Date: 11/15/2024  Length of Stay: 12 days  Attending Physician: Dimitris Rojo III*  Primary Care Provider: Vanessa Noel MD        Subjective:     Principal Problem:Cellulitis of left thigh        HPI:  Per admitting physician (11/15):  60yo female whose PMH includes bilateral BKA, ESRD, DM, HTN, HLD presents with purulence from L stump wound.  She was discharged from this facility 11.12.24 with oral abx or LLE cellulitis after bring admitted for septic shock.  During that admission general surgery was consulted for further evaluation d/t concerns for nec fasc as there was subcutaneous emphysema noted on CT; after their clinical assessment nec fasc was ruled out.  She did receive IV abx during that admission and on discharge was transitioned to oral augmentin and doxy and she went to SNF.  She was sent back to the ED for continued purulence and LLE pain.  Surgery was consulted in the ED and have no plans for intervention at this time.  MR is pending.  White count is normal.  She has been admitted to medicine for further management.    Overview/Hospital Course:  Per prior attending physician, Dr. Traore:  Admitted for LLE cellulitis; failed outpatient oral abx.  Discharged from this facility 11.12.24 with oral abx or LLE cellulitis after bring admitted for septic shock; on discharge was transitioned to oral augmentin and doxy and she went to SNF.  Sent back to the ED for continued purulence and LLE pain.  Surgery was consulted in the ED and have no plans for intervention at this time.  Repeat CT here showed postop change of bilateral above knee amputation, no destructive osseous process to suggest osteomyelitis in the remaining portions of the left femur or in the visualized proximal portion of the cut right femur, skin defect along the medial aspect of the  left thigh with foci of air underlying the wound, additional area of soft tissue contour indentation and focal skin thickening along the anterior thigh, suggestive of possible infectious or inflammatory process versus region of scarring, additional extensive soft tissue edema and skin thickening of the pelvis, partially visualized right lower extremity, and left lower extremity.  MR non diagnostic due to motion artifact.  White count is normal.  Receiving vanc/cefepime; ID consulted.  Repeat MR with sedation postoperative changes from above the knee amputation, 3.5 cm fluid collection at the femoral stump, which could represent a seroma, hematoma, or abscess; no evidence of osteomyelitis.  There are two fluid collections in the left groin measuring up to 2.4 cm, diffuse subcutaneous and intermuscular edema, which could be inflammatory or infectious, diffuse muscle atrophy with corresponding edema like signal, which could represent denervation or myositis.  CTA with runoff showed on the left, there is occlusion of the proximal left common iliac artery with reconstitution more distally though again diffuse circumferential severe plaque involves both the internal and external iliac arteries.  The external iliac artery is patent with diffuse plaque approximately 60-80% diameter narrowing.  The left common femoral is patent.  Multiple calcifications at the bifurcation.  Occluded left SFA bypass graft. Native left SFA also is occluded.  Severe plaque extends into the profundus femoral branches.  Only collateral vessels are identified in the left thigh.  Diffuse calcified plaque in the occluded left SFA noted.  Other findings noted in the result report.  Vascular surgery consulted; tentative plan for intervention 11.22.24.    OR 11/22 with vascular surgery:  Aortogram and BLE angiogram with left common iliac stent placement and left AKA stump debridement.  Postoperative anemia requiring 2 units PRBCs 11/23 and 11/24.  BM  reported by  at bedside, brown stool.  Urine saturated pad noted by , no blood.  Contacted vascular surgery morning of 11/24 to evaluate left upper leg, no concern for ongoing bleeding.  S/p wound VAC change 11/27 with vascular surgery.  Dispo pending home wound VAC and confirmation of IV antibiotics at dialysis    Interval History:  Pt reports LLE discomfort once wound VAC was turned on this morning.  Denies any chest pain, SOB, abdominal pain, nausea, vomiting.  Will need to obtain home wound VAC and confirm outpatient IV antibiotics at HD prior to discharge.    Labs personally reviewed: Hb 8.4, Na 129, K 4.1, CO2 19, Cr 2.3    Vascular surgery consulted:  Wound VAC change evening of 11/27.  Wound care to take over vac management.    Objective:     Vital Signs (Most Recent):  Temp: 97.9 °F (36.6 °C) (11/28/24 2012)  Pulse: 83 (11/28/24 2012)  Resp: 18 (11/28/24 2012)  BP: 120/75 (11/28/24 2012)  SpO2: 95 % (11/28/24 2012) Vital Signs (24h Range):  Temp:  [97.5 °F (36.4 °C)-98.4 °F (36.9 °C)] 97.9 °F (36.6 °C)  Pulse:  [78-87] 83  Resp:  [15-20] 18  SpO2:  [92 %-98 %] 95 %  BP: (106-126)/(64-81) 120/75     Weight: 71.1 kg (156 lb 12 oz)  Body mass index is 47.83 kg/m².  No intake or output data in the 24 hours ending 11/28/24 2014        Physical Exam  Cardiovascular:      Rate and Rhythm: Normal rate and regular rhythm.   Pulmonary:      Effort: No respiratory distress.      Breath sounds: Normal breath sounds.   Abdominal:      General: Bowel sounds are normal.      Palpations: Abdomen is soft.   Musculoskeletal:      Cervical back: Normal range of motion.      Left lower leg: Edema present.      Comments: Wound vac is in place on stump draining serosanguineous fluid.   Neurological:      General: No focal deficit present.      Mental Status: She is alert.         Assessment/Plan:      * Cellulitis of left thigh  ID consulted:  Cont vanc/cefepime.  EOT 12/01/2024.  Will need to contact HD to make  sure antibiotics can be administered during treatment.  Blood cultures NGTD  Pain control  Wound VAC change 11/27.  Wound care team to manage VAC moving forward.  Will need home wound VAC.    Peripheral vascular disease  S/P Aortogram, bilateral lower extremity angiogram w/ L common iliac stent   Cont statin  D/w vasc, resumed Eliquis with Plavix on 11/25  See plan for cellulitis    Anemia due to chronic kidney disease, on chronic dialysis  Postoperative anemia  Goal directed resuscitation, transfuse for Hgb<7  2 units PRBCs transfused total:  11/23 and 11/24  Asymptomatic  Hb stable    Postoperative anemia  - Transfuse PRBC if patient becomes hemodynamically unstable, symptomatic or H/H drops below 7/21.  - Patient had PRBC 1 unit 11/23 and 11/24  - HH is remains stable at this time with no sign of bleeding,    Atherosclerosis of native coronary artery of native heart with angina pectoris   On medical treatments.    Severe obesity (BMI >= 40)  Body mass index is 47.9 kg/m².  Would benefit from dietary and lifestyle modifications in relation to health  Consider dietary/nutrition consult outpatient    ESRD (end stage renal disease)  Nephrology consulted HD management. HD TThS    Chronic combined systolic and diastolic congestive heart failure  TTE 05/2024 EF 15% with diastolic dysfunction.  RV systolic function mildly reduced.  RV wall motion global hypokinesis.  Severe TR.    TTE mentions regional WMA but does not mention EF.  RV function severely reduced..  Severe TR.  Repeat echo pending.  Cont Isordil.  Coreg and hydralazine held, resume when BP will tolerate.  Monitor on telemetry, low-Na diet, fluid restriction.  Volume managed with HD  Will need outpatient cardiology follow up    Hyponatremia  In the setting of ESRD.  To be managed with HD    Paroxysmal atrial fibrillation  Eliquis resumed 11/25 per discussion with vascular surgery    CAD (coronary artery disease)  Resumed eliquis/plavix 11/25  Cont  statin    Type 2 diabetes mellitus with hyperglycemia, with long-term current use of insulin  SSI  Diabetic diet when not NPO  ACHS glucose checks    CAROLIN (generalized anxiety disorder)  Cont zoloft      VTE Risk Mitigation (From admission, onward)           Ordered     apixaban tablet 5 mg  2 times daily         11/25/24 0820     IP VTE HIGH RISK PATIENT  Once         11/20/24 1622     heparin (porcine) injection 1,000 Units  As needed (PRN)         11/16/24 1159     Place sequential compression device  Until discontinued         11/15/24 1648     Reason for No Pharmacological VTE Prophylaxis  Once        Question:  Reasons:  Answer:  Already adequately anticoagulated on oral Anticoagulants    11/15/24 1648                    Discharge Planning   DEVAN: 11/29/2024     Code Status: Full Code   Is the patient medically ready for discharge?:     Reason for patient still in hospital (select all that apply): Patient trending condition, Laboratory test, Treatment, and Pending disposition  Discharge Plan A: Skilled Nursing Facility                  Dimitris Rojo III, MD  Department of Hospital Medicine   [facility]

## 2024-11-30 ENCOUNTER — OFFICE VISIT (OUTPATIENT)
Dept: DIALYSIS | Facility: HOSPITAL | Age: 59
DRG: 463 | End: 2024-11-30
Attending: STUDENT IN AN ORGANIZED HEALTH CARE EDUCATION/TRAINING PROGRAM
Payer: MEDICARE

## 2024-11-30 LAB
ALBUMIN SERPL BCP-MCNC: 2.2 G/DL (ref 3.5–5.2)
ANION GAP SERPL CALC-SCNC: 8 MMOL/L (ref 8–16)
BASOPHILS # BLD AUTO: 0.11 K/UL (ref 0–0.2)
BASOPHILS NFR BLD: 1.7 % (ref 0–1.9)
BUN SERPL-MCNC: 8 MG/DL (ref 6–20)
CALCIUM SERPL-MCNC: 8.3 MG/DL (ref 8.7–10.5)
CHLORIDE SERPL-SCNC: 99 MMOL/L (ref 95–110)
CO2 SERPL-SCNC: 22 MMOL/L (ref 23–29)
CREAT SERPL-MCNC: 2.2 MG/DL (ref 0.5–1.4)
DIFFERENTIAL METHOD BLD: ABNORMAL
EOSINOPHIL # BLD AUTO: 0.2 K/UL (ref 0–0.5)
EOSINOPHIL NFR BLD: 3.6 % (ref 0–8)
ERYTHROCYTE [DISTWIDTH] IN BLOOD BY AUTOMATED COUNT: 20.3 % (ref 11.5–14.5)
EST. GFR  (NO RACE VARIABLE): 25.4 ML/MIN/1.73 M^2
GLUCOSE SERPL-MCNC: 115 MG/DL (ref 70–110)
HCT VFR BLD AUTO: 25.7 % (ref 37–48.5)
HGB BLD-MCNC: 7.9 G/DL (ref 12–16)
IMM GRANULOCYTES # BLD AUTO: 0.01 K/UL (ref 0–0.04)
IMM GRANULOCYTES NFR BLD AUTO: 0.2 % (ref 0–0.5)
LYMPHOCYTES # BLD AUTO: 0.9 K/UL (ref 1–4.8)
LYMPHOCYTES NFR BLD: 12.9 % (ref 18–48)
MAGNESIUM SERPL-MCNC: 1.9 MG/DL (ref 1.6–2.6)
MCH RBC QN AUTO: 31.2 PG (ref 27–31)
MCHC RBC AUTO-ENTMCNC: 30.7 G/DL (ref 32–36)
MCV RBC AUTO: 102 FL (ref 82–98)
MONOCYTES # BLD AUTO: 1 K/UL (ref 0.3–1)
MONOCYTES NFR BLD: 14.5 % (ref 4–15)
NEUTROPHILS # BLD AUTO: 4.4 K/UL (ref 1.8–7.7)
NEUTROPHILS NFR BLD: 67.1 % (ref 38–73)
NRBC BLD-RTO: 0 /100 WBC
PHOSPHATE SERPL-MCNC: 2.4 MG/DL (ref 2.7–4.5)
PLATELET # BLD AUTO: 303 K/UL (ref 150–450)
PMV BLD AUTO: 10.4 FL (ref 9.2–12.9)
POCT GLUCOSE: 101 MG/DL (ref 70–110)
POCT GLUCOSE: 115 MG/DL (ref 70–110)
POCT GLUCOSE: 144 MG/DL (ref 70–110)
POCT GLUCOSE: 165 MG/DL (ref 70–110)
POTASSIUM SERPL-SCNC: 4 MMOL/L (ref 3.5–5.1)
RBC # BLD AUTO: 2.53 M/UL (ref 4–5.4)
SODIUM SERPL-SCNC: 129 MMOL/L (ref 136–145)
VANCOMYCIN SERPL-MCNC: 24.1 UG/ML
WBC # BLD AUTO: 6.61 K/UL (ref 3.9–12.7)

## 2024-11-30 PROCEDURE — 80100016 HC MAINTENANCE HEMODIALYSIS: Mod: HCNC

## 2024-11-30 PROCEDURE — 80202 ASSAY OF VANCOMYCIN: CPT | Mod: HCNC | Performed by: FAMILY MEDICINE

## 2024-11-30 PROCEDURE — 90935 HEMODIALYSIS ONE EVALUATION: CPT | Mod: HCNC,,, | Performed by: NURSE PRACTITIONER

## 2024-11-30 PROCEDURE — 63600175 PHARM REV CODE 636 W HCPCS: Mod: HCNC

## 2024-11-30 PROCEDURE — 83735 ASSAY OF MAGNESIUM: CPT | Mod: HCNC | Performed by: FAMILY MEDICINE

## 2024-11-30 PROCEDURE — 25000003 PHARM REV CODE 250: Mod: HCNC | Performed by: ANESTHESIOLOGY

## 2024-11-30 PROCEDURE — 21400001 HC TELEMETRY ROOM: Mod: HCNC

## 2024-11-30 PROCEDURE — 80069 RENAL FUNCTION PANEL: CPT | Mod: HCNC | Performed by: FAMILY MEDICINE

## 2024-11-30 PROCEDURE — 63600175 PHARM REV CODE 636 W HCPCS: Mod: HCNC | Performed by: FAMILY MEDICINE

## 2024-11-30 PROCEDURE — 25000003 PHARM REV CODE 250: Mod: HCNC | Performed by: FAMILY MEDICINE

## 2024-11-30 PROCEDURE — 85025 COMPLETE CBC W/AUTO DIFF WBC: CPT | Mod: HCNC | Performed by: FAMILY MEDICINE

## 2024-11-30 PROCEDURE — 25000003 PHARM REV CODE 250: Mod: HCNC | Performed by: NURSE PRACTITIONER

## 2024-11-30 PROCEDURE — 25000003 PHARM REV CODE 250: Mod: HCNC

## 2024-11-30 PROCEDURE — A4216 STERILE WATER/SALINE, 10 ML: HCPCS | Mod: HCNC | Performed by: ANESTHESIOLOGY

## 2024-11-30 RX ORDER — CARVEDILOL 3.12 MG/1
3.12 TABLET ORAL 2 TIMES DAILY
Status: DISCONTINUED | OUTPATIENT
Start: 2024-11-30 | End: 2024-12-05 | Stop reason: HOSPADM

## 2024-11-30 RX ORDER — ISOSORBIDE DINITRATE 5 MG/1
5 TABLET ORAL 2 TIMES DAILY
Status: DISCONTINUED | OUTPATIENT
Start: 2024-11-30 | End: 2024-12-05 | Stop reason: HOSPADM

## 2024-11-30 RX ORDER — SODIUM CHLORIDE 9 MG/ML
INJECTION, SOLUTION INTRAVENOUS ONCE
Status: COMPLETED | OUTPATIENT
Start: 2024-11-30 | End: 2024-11-30

## 2024-11-30 RX ADMIN — Medication 3 ML: at 12:11

## 2024-11-30 RX ADMIN — MICONAZOLE NITRATE: 20 OINTMENT TOPICAL at 09:11

## 2024-11-30 RX ADMIN — PREGABALIN 75 MG: 75 CAPSULE ORAL at 05:11

## 2024-11-30 RX ADMIN — HEPARIN SODIUM 1000 UNITS: 1000 INJECTION, SOLUTION INTRAVENOUS; SUBCUTANEOUS at 10:11

## 2024-11-30 RX ADMIN — HYDROMORPHONE HYDROCHLORIDE 0.5 MG: 1 INJECTION, SOLUTION INTRAMUSCULAR; INTRAVENOUS; SUBCUTANEOUS at 09:11

## 2024-11-30 RX ADMIN — APIXABAN 5 MG: 5 TABLET, FILM COATED ORAL at 12:11

## 2024-11-30 RX ADMIN — Medication: at 09:11

## 2024-11-30 RX ADMIN — Medication 3 ML: at 05:11

## 2024-11-30 RX ADMIN — Medication 3 ML: at 06:11

## 2024-11-30 RX ADMIN — OXYCODONE AND ACETAMINOPHEN 1 TABLET: 10; 325 TABLET ORAL at 08:11

## 2024-11-30 RX ADMIN — SERTRALINE HYDROCHLORIDE 100 MG: 50 TABLET ORAL at 12:11

## 2024-11-30 RX ADMIN — ISOSORBIDE DINITRATE 5 MG: 5 TABLET ORAL at 08:11

## 2024-11-30 RX ADMIN — MICONAZOLE NITRATE: 20 OINTMENT TOPICAL at 12:11

## 2024-11-30 RX ADMIN — CARVEDILOL 3.12 MG: 3.12 TABLET, FILM COATED ORAL at 08:11

## 2024-11-30 RX ADMIN — SODIUM CHLORIDE: 9 INJECTION, SOLUTION INTRAVENOUS at 07:11

## 2024-11-30 RX ADMIN — Medication 6 MG: at 08:11

## 2024-11-30 RX ADMIN — ALLOPURINOL 50 MG: 300 TABLET ORAL at 12:11

## 2024-11-30 RX ADMIN — APIXABAN 5 MG: 5 TABLET, FILM COATED ORAL at 08:11

## 2024-11-30 RX ADMIN — Medication: at 12:11

## 2024-11-30 RX ADMIN — ATORVASTATIN CALCIUM 80 MG: 40 TABLET, FILM COATED ORAL at 12:11

## 2024-11-30 RX ADMIN — CEFEPIME 1 G: 1 INJECTION, POWDER, FOR SOLUTION INTRAMUSCULAR; INTRAVENOUS at 09:11

## 2024-11-30 RX ADMIN — CLOPIDOGREL BISULFATE 75 MG: 75 TABLET ORAL at 12:11

## 2024-11-30 RX ADMIN — CARVEDILOL 3.12 MG: 3.12 TABLET, FILM COATED ORAL at 12:11

## 2024-11-30 RX ADMIN — CALCITRIOL 0.5 MCG: 0.5 CAPSULE, LIQUID FILLED ORAL at 12:11

## 2024-11-30 NOTE — ASSESSMENT & PLAN NOTE
TTE 05/2024 EF 15% with diastolic dysfunction.  RV systolic function mildly reduced.  RV wall motion global hypokinesis.  Severe TR.    TTE 11/29 EF 35-40%, hypokinesis of anterior and inferior septum, RV with severely reduced systolic function, moderate to severe TR.  Cont Isordil and Coreg.  Home hydralazine held, resume when BP will tolerate.  Monitor on telemetry, low-Na diet, fluid restriction.  Volume managed with HD  Will need outpatient cardiology follow up

## 2024-11-30 NOTE — SUBJECTIVE & OBJECTIVE
Interval History:  Pt reports LLE pain that is manageable with current medications.  Denies any chest pain, SOB, abdominal pain, nausea, vomiting.    Labs personally reviewed:  Hb 7.9, Na 129, K 4.0, BUN 8, Cr 2.2    Discussed with Nephrology:  Plan for hemodialysis 11/30 then resume TTS scheduled.    Objective:     Vital Signs (Most Recent):  Temp: 98.1 °F (36.7 °C) (11/30/24 1147)  Pulse: 79 (11/30/24 1147)  Resp: 18 (11/30/24 1147)  BP: 124/80 (11/30/24 1147)  SpO2: 98 % (11/30/24 1147) Vital Signs (24h Range):  Temp:  [97.6 °F (36.4 °C)-98.2 °F (36.8 °C)] 98.1 °F (36.7 °C)  Pulse:  [76-90] 79  Resp:  [18] 18  SpO2:  [94 %-100 %] 98 %  BP: ()/(43-88) 124/80     Weight: 71 kg (156 lb 8.4 oz)  Body mass index is 47.76 kg/m².    Intake/Output Summary (Last 24 hours) at 11/30/2024 1301  Last data filed at 11/30/2024 1045  Gross per 24 hour   Intake 300 ml   Output 2600 ml   Net -2300 ml           Physical Exam  Cardiovascular:      Rate and Rhythm: Normal rate and regular rhythm.   Pulmonary:      Effort: No respiratory distress.      Breath sounds: Normal breath sounds.   Abdominal:      General: Bowel sounds are normal.      Palpations: Abdomen is soft.   Musculoskeletal:      Cervical back: Normal range of motion.      Left lower leg: Edema present.      Comments: Wound vac is in place on stump draining serosanguineous fluid.   Neurological:      General: No focal deficit present.      Mental Status: She is alert.

## 2024-11-30 NOTE — ASSESSMENT & PLAN NOTE
TTE 05/2024 EF 15% with diastolic dysfunction.  RV systolic function mildly reduced.  RV wall motion global hypokinesis.  Severe TR.    TTE 11/29 EF 35-40%, hypokinesis of anterior and inferior septum, RV with severely reduced systolic function, moderate to severe TR.  Cont Isordil.  Coreg and hydralazine held, resume when BP will tolerate.  Monitor on telemetry, low-Na diet, fluid restriction.  Volume managed with HD  Will need outpatient cardiology follow up

## 2024-11-30 NOTE — ASSESSMENT & PLAN NOTE
ID consulted:  Cont vanc/cefepime.  Last day 11/30.    Blood cultures NGTD  Pain control  Wound VAC change 11/27.  Wound care team to manage VAC moving forward.  Stable for discharge once home wound VAC obtained.

## 2024-11-30 NOTE — ASSESSMENT & PLAN NOTE
Body mass index is 47.76 kg/m².  Would benefit from dietary and lifestyle modifications in relation to health  Consider dietary/nutrition consult outpatient

## 2024-11-30 NOTE — NURSING
Patient  arrived in bed . Both lumens of left HD catheter flushes  good . Dialysis treatment  started .   Report received  from Primary Nurse .

## 2024-11-30 NOTE — PROGRESS NOTES
[facility] NOTE     Patient currently on hemodialysis for removal of uremic toxins .     Patient seen and evaluated on hemodialysis, tolerating treatment, see HD flowsheet for vitals and assessments.      No Hypotension, chest pain, shortness of breath, cramping, nausea or vomiting.     Target UF: 2 L as tolerated, keep MAP >65.  Additional HD today to transition patient back to TTS schedule in lieu of discharge. HD yesterday with 2 L removed.  On EPO  Phos 2.4, no binders  Consider renal diet restrictions; please limit daily intake to 32 oz per day.   No lab stick/BP intake on access site  Continue to monitor intake and output, daily weights   Please avoid gadolinium, fleets, phos-based laxatives, NSAIDs  Will follow closely and continue dialysis treatments while in-patient    KATHLEEN Faulkner DNP, APRN, FNP-C  Department of Nephrology  [facility]  Pager: [phone]

## 2024-11-30 NOTE — PROGRESS NOTES
Pharmacokinetic Assessment Follow Up: IV Vancomycin    Vancomycin serum concentration assessment(s):    -The random level was drawn correctly and can be used to guide therapy at this time  -The measurement is above the desired definitive target range of 10 to 20 mcg/mL.  -Pt to receive dialysis today (11/30)    Vancomycin Regimen Plan:    -Anticipated EOT 12/01 per ID recs  -Plan to hold dose for today and sign off consult tomorrow    Drug levels (last 3 results):  Recent Labs   Lab Result Units 11/27/24  1944 11/29/24 0437 11/30/24  0308   Vancomycin, Random ug/mL 17.9 16.4 24.1       Pharmacy will continue to follow and monitor vancomycin.    Please contact pharmacy at extension [phone] for questions regarding this assessment.    Thank you for the consult,   Jacinto Cardenas       Patient brief summary:  Suyapa Connelly is a 59 y.o. female initiated on antimicrobial therapy with IV Vancomycin for treatment of skin & soft tissue infection    Drug Allergies:   Review of patient's allergies indicates:   Allergen Reactions    Ciprofloxacin Itching    Pcn [penicillins]      Rash; tolerated ceftriaxone on 1/13/20  Tolerating Augmentin November 2024       Actual Body Weight:   71 kg    Renal Function:   Estimated Creatinine Clearance: 21 mL/min (A) (based on SCr of 2.2 mg/dL (H)).,     Dialysis Method (if applicable):  intermittent HD    CBC (last 72 hours):  Recent Labs   Lab Result Units 11/28/24 0822 11/29/24 0437 11/30/24  0309   WBC K/uL 7.86 6.63 6.61   Hemoglobin g/dL 8.4* 7.6* 7.9*   Hematocrit % 27.5* 25.1* 25.7*   Platelets K/uL 324 323 303   Gran % % 72.7 68.4 67.1   Lymph % % 9.7* 11.5* 12.9*   Mono % % 13.5 15.2* 14.5   Eosinophil % % 2.5 3.0 3.6   Basophil % % 1.3 1.4 1.7   Differential Method  Automated Automated Automated       Metabolic Panel (last 72 hours):  Recent Labs   Lab Result Units 11/28/24 0822 11/29/24 0437 11/30/24  0308   Sodium mmol/L 129* 127* 129*   Potassium mmol/L 4.1 4.1 4.0    Chloride mmol/L 97 95 99   CO2 mmol/L 19* 23 22*   Glucose mg/dL 78 95 115*   BUN mg/dL 10 13 8   Creatinine mg/dL 2.3* 3.0* 2.2*   Albumin g/dL 2.0* 1.8* 2.2*   Magnesium mg/dL 1.9 2.0 1.9   Phosphorus mg/dL 2.6* 3.1 2.4*       Vancomycin Administrations:  vancomycin given in the last 96 hours                     vancomycin 750 mg in 0.9% NaCl 250 mL IVPB (admixture device) (mg) 750 mg New Bag 11/29/24 1450    vancomycin 750 mg in 0.9% NaCl 250 mL IVPB (admixture device) (mg) 750 mg New Bag 11/26/24 1430                    Microbiologic Results:  Microbiology Results (last 7 days)       ** No results found for the last 168 hours. **

## 2024-11-30 NOTE — PLAN OF CARE
Problem: Adult Inpatient Plan of Care  Goal: Plan of Care Review  Outcome: Not Progressing  Goal: Patient-Specific Goal (Individualized)  Outcome: Not Progressing  Goal: Absence of Hospital-Acquired Illness or Injury  Outcome: Not Progressing  Goal: Optimal Comfort and Wellbeing  Outcome: Not Progressing  Goal: Readiness for Transition of Care  Outcome: Not Progressing     Problem: Bariatric Environmental Safety  Goal: Safety Maintained with Care  Outcome: Not Progressing     Problem: Infection  Goal: Absence of Infection Signs and Symptoms  Outcome: Not Progressing     Problem: Wound  Goal: Optimal Coping  Outcome: Not Progressing  Goal: Optimal Functional Ability  Outcome: Not Progressing  Goal: Absence of Infection Signs and Symptoms  Outcome: Not Progressing  Goal: Improved Oral Intake  Outcome: Not Progressing  Goal: Optimal Pain Control and Function  Outcome: Not Progressing  Goal: Skin Health and Integrity  Outcome: Not Progressing  Goal: Optimal Wound Healing  Outcome: Not Progressing     Problem: Sepsis/Septic Shock  Goal: Optimal Coping  Outcome: Not Progressing  Goal: Absence of Bleeding  Outcome: Not Progressing  Goal: Blood Glucose Level Within Targeted Range  Outcome: Not Progressing  Goal: Absence of Infection Signs and Symptoms  Outcome: Not Progressing  Goal: Optimal Nutrition Intake  Outcome: Not Progressing     Problem: Skin Injury Risk Increased  Goal: Skin Health and Integrity  Outcome: Not Progressing     Problem: Diabetes Comorbidity  Goal: Blood Glucose Level Within Targeted Range  Outcome: Not Progressing     Problem: Fall Injury Risk  Goal: Absence of Fall and Fall-Related Injury  Outcome: Not Progressing     Problem: Hemodialysis  Goal: Safe, Effective Therapy Delivery  Outcome: Not Progressing  Goal: Effective Tissue Perfusion  Outcome: Not Progressing  Goal: Absence of Infection Signs and Symptoms  Outcome: Not Progressing     Problem: Chronic Kidney Disease  Goal: Electrolyte  Balance  Outcome: Not Progressing  Goal: Fluid Balance  Outcome: Not Progressing

## 2024-11-30 NOTE — PROGRESS NOTES
[facility]  Progress Note    Patient Name: Suyapa Connelly  MRN: [id number]  Patient Class: IP- Inpatient   Admission Date: 11/15/2024  Length of Stay: 14 days  Attending Physician: Dimitris Rojo III*  Primary Care Provider: Vanessa Noel MD        Subjective:     Principal Problem:Cellulitis of left thigh        HPI:  Per admitting physician (11/15):  58yo female whose PMH includes bilateral BKA, ESRD, DM, HTN, HLD presents with purulence from L stump wound.  She was discharged from this facility 11.12.24 with oral abx or LLE cellulitis after bring admitted for septic shock.  During that admission general surgery was consulted for further evaluation d/t concerns for nec fasc as there was subcutaneous emphysema noted on CT; after their clinical assessment nec fasc was ruled out.  She did receive IV abx during that admission and on discharge was transitioned to oral augmentin and doxy and she went to SNF.  She was sent back to the ED for continued purulence and LLE pain.  Surgery was consulted in the ED and have no plans for intervention at this time.  MR is pending.  White count is normal.  She has been admitted to medicine for further management.    Overview/Hospital Course:  Admitted for failed outpatient treatment of LLE cellulitis.  Previously admitted to this facility 10/28-11/12 for septic shock d/t LLE cellulitis and was discharged to SNF on Augmentin and doxycycline.  Continued purulence and LLE pain at facility and pt readmitted for ongoing infection.  ID consulted, treated with vanc and cefepime.  MRI revealed multiple fluid collections of the LLE and no evidence of osteomyelitis.  Vascular imaging revealed PAD, specifically left common iliac occlusion.  Vascular surgery consulted, s/p Aortogram and BLE angiogram with left common iliac stent placement and left AKA stump debridement on 11/22.  Postoperative anemia requiring 2 units PRBCs 11/23 and 11/24.  BM reported  by  at bedside, brown stool.  Urine saturated pad noted by , no blood.  Contacted vascular surgery morning of 11/24 to evaluate left upper leg, no concern for ongoing bleeding.  S/p wound VAC change 11/27 with vascular surgery.  Last day of IV antibiotics 11/30, will complete 14 days.  CM/SW assisting with home wound VAC, can discharge home with home health once obtained.  Pt and  decline discharge to facility    Interval History:  Pt reports LLE pain that is manageable with current medications.  Denies any chest pain, SOB, abdominal pain, nausea, vomiting.    Labs personally reviewed:  Hb 7.9, Na 129, K 4.0, BUN 8, Cr 2.2    Discussed with Nephrology:  Plan for hemodialysis 11/30 then resume TTS scheduled.    Objective:     Vital Signs (Most Recent):  Temp: 98.1 °F (36.7 °C) (11/30/24 1147)  Pulse: 79 (11/30/24 1147)  Resp: 18 (11/30/24 1147)  BP: 124/80 (11/30/24 1147)  SpO2: 98 % (11/30/24 1147) Vital Signs (24h Range):  Temp:  [97.6 °F (36.4 °C)-98.2 °F (36.8 °C)] 98.1 °F (36.7 °C)  Pulse:  [76-90] 79  Resp:  [18] 18  SpO2:  [94 %-100 %] 98 %  BP: ()/(43-88) 124/80     Weight: 71 kg (156 lb 8.4 oz)  Body mass index is 47.76 kg/m².    Intake/Output Summary (Last 24 hours) at 11/30/2024 1301  Last data filed at 11/30/2024 1045  Gross per 24 hour   Intake 300 ml   Output 2600 ml   Net -2300 ml           Physical Exam  Cardiovascular:      Rate and Rhythm: Normal rate and regular rhythm.   Pulmonary:      Effort: No respiratory distress.      Breath sounds: Normal breath sounds.   Abdominal:      General: Bowel sounds are normal.      Palpations: Abdomen is soft.   Musculoskeletal:      Cervical back: Normal range of motion.      Left lower leg: Edema present.      Comments: Wound vac is in place on stump draining serosanguineous fluid.   Neurological:      General: No focal deficit present.      Mental Status: She is alert.         Assessment/Plan:      * Cellulitis of left thigh  ID  consulted:  Cont vanc/cefepime.  Last day 11/30.    Blood cultures NGTD  Pain control  Wound VAC change 11/27.  Wound care team to manage VAC moving forward.  Stable for discharge once home wound VAC obtained.    Peripheral vascular disease  S/P Aortogram, bilateral lower extremity angiogram w/ L common iliac stent   Cont statin  D/w vasc, resumed Eliquis with Plavix on 11/25  See plan for cellulitis    Anemia due to chronic kidney disease, on chronic dialysis  Postoperative anemia  Goal directed resuscitation, transfuse for Hgb<7  2 units PRBCs transfused total:  11/23 and 11/24  Asymptomatic  Hb stable    Postoperative anemia  - Transfuse PRBC if patient becomes hemodynamically unstable, symptomatic or H/H drops below 7/21.  - Patient had PRBC 1 unit 11/23 and 11/24  - HH remains stable at this time with no sign of bleeding,    Atherosclerosis of native coronary artery of native heart with angina pectoris  Cont Eliquis and Plavix    Severe obesity (BMI >= 40)  Body mass index is 47.76 kg/m².  Would benefit from dietary and lifestyle modifications in relation to health  Consider dietary/nutrition consult outpatient    ESRD (end stage renal disease)  Nephrology consulted HD management. HD TThS    Chronic combined systolic and diastolic congestive heart failure  TTE 05/2024 EF 15% with diastolic dysfunction.  RV systolic function mildly reduced.  RV wall motion global hypokinesis.  Severe TR.    TTE 11/29 EF 35-40%, hypokinesis of anterior and inferior septum, RV with severely reduced systolic function, moderate to severe TR.  Cont Isordil and Coreg.  Home hydralazine held, resume when BP will tolerate.  Monitor on telemetry, low-Na diet, fluid restriction.  Volume managed with HD  Will need outpatient cardiology follow up    Hyponatremia  In the setting of ESRD.  To be managed with HD    Paroxysmal atrial fibrillation  Eliquis resumed 11/25 per discussion with vascular surgery    CAD (coronary artery  disease)  Resumed eliquis/plavix 11/25  Cont statin    Type 2 diabetes mellitus with hyperglycemia, with long-term current use of insulin  SSI  Diabetic diet when not NPO  ACHS glucose checks    CAROLIN (generalized anxiety disorder)  Cont zoloft      VTE Risk Mitigation (From admission, onward)           Ordered     apixaban tablet 5 mg  2 times daily         11/25/24 0820     IP VTE HIGH RISK PATIENT  Once         11/20/24 1622     heparin (porcine) injection 1,000 Units  As needed (PRN)         11/16/24 1159     Place sequential compression device  Until discontinued         11/15/24 1648     Reason for No Pharmacological VTE Prophylaxis  Once        Question:  Reasons:  Answer:  Already adequately anticoagulated on oral Anticoagulants    11/15/24 1648                    Discharge Planning   DEVAN: 12/2/2024     Code Status: Full Code   Is the patient medically ready for discharge?:     Reason for patient still in hospital (select all that apply): Patient trending condition, Consult recommendations, and Pending disposition  Discharge Plan A: Home Health   Discharge Delays: None known at this time              Dimitris Rojo III, MD  Department of Hospital Medicine   [facility]

## 2024-11-30 NOTE — SUBJECTIVE & OBJECTIVE
Interval History:  Pt seen and examined during dialysis.  Reports continued LLE discomfort tolerable with pain medications.  Denies any chest pain, shortness breath, abdominal pain, nausea, vomiting.    Labs personally reviewed:  Hb 7.6, Na 127, K 4.1, Cr 3.0    Discussed with Nephrology: HD 11/29 with short course planned for 11/30 to get her on track for outpatient TTS schedule.    Objective:     Vital Signs (Most Recent):  Temp: 97.6 °F (36.4 °C) (11/29/24 1945)  Pulse: 83 (11/29/24 1945)  Resp: 18 (11/29/24 1945)  BP: 104/67 (11/29/24 1945)  SpO2: 96 % (11/29/24 1945) Vital Signs (24h Range):  Temp:  [97.6 °F (36.4 °C)-98.3 °F (36.8 °C)] 97.6 °F (36.4 °C)  Pulse:  [71-87] 83  Resp:  [16-18] 18  SpO2:  [91 %-96 %] 96 %  BP: ()/(44-79) 104/67     Weight: 71 kg (156 lb 8.4 oz)  Body mass index is 47.76 kg/m².    Intake/Output Summary (Last 24 hours) at 11/29/2024 2008  Last data filed at 11/29/2024 1145  Gross per 24 hour   Intake 200 ml   Output 2686 ml   Net -2486 ml           Physical Exam  Cardiovascular:      Rate and Rhythm: Normal rate and regular rhythm.   Pulmonary:      Effort: No respiratory distress.      Breath sounds: Normal breath sounds.   Abdominal:      General: Bowel sounds are normal.      Palpations: Abdomen is soft.   Musculoskeletal:      Cervical back: Normal range of motion.      Left lower leg: Edema present.      Comments: Wound vac is in place on stump draining serosanguineous fluid.   Neurological:      General: No focal deficit present.      Mental Status: She is alert.

## 2024-11-30 NOTE — NURSING
3 hours  dialysis treatment completed . 2 L UF was removed . Both lumens of  HD catheter were flushed , heparin locked and wrapped with tape and guaze .   Report  given to  Primary Nurse .

## 2024-11-30 NOTE — PLAN OF CARE
Sw asked to follow up on Pt's dc with home wound vac and home health. Sw reviewed notes, still pending vac setup, [facility] has accepted, once wound vac secured, Pt can dc. Pt has managed care and dc likely Monday, staff and nursing updated.

## 2024-11-30 NOTE — CONSULTS
[facility]  Wound Care    Patient Name:  Suyapa Connelly   MRN:  [id number]  Date: 11/30/2024  Diagnosis: Cellulitis of left thigh    History:     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    Osteomyelitis     Osteomyelitis of left foot 04/30/2021    Ulcer of left foot     Vaginal delivery     x1       Social History     Socioeconomic History    Marital status:    Occupational History    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      Social Drivers of Health     Financial Resource Strain: Low Risk  (11/16/2024)    Overall Financial Resource Strain (CARDIA)     Difficulty of Paying Living Expenses: Not hard at all   Recent Concern: Financial Resource Strain - Medium Risk (10/29/2024)    Overall Financial Resource Strain (CARDIA)     Difficulty of Paying Living Expenses: Somewhat hard   Food Insecurity: No Food Insecurity (11/16/2024)    Hunger Vital Sign     Worried About Running Out of Food in the Last Year: Never true     Ran Out of Food in the Last Year: Never true   Transportation Needs: No Transportation Needs (11/16/2024)    TRANSPORTATION NEEDS     Transportation : No   Physical Activity: Inactive (11/14/2024)    Exercise Vital Sign     Days of Exercise per Week: 0 days     Minutes of Exercise per Session: 0 min   Stress: No Stress Concern Present (11/16/2024)    [employer] - Occupational Stress Questionnaire     Feeling of Stress : Not  at all   Recent Concern: Stress - Stress Concern Present (10/29/2024)    [employer] [place] [employer] - Occupational Stress Questionnaire     Feeling of Stress : To some extent   Housing Stability: Low Risk  (11/16/2024)    Housing Stability Vital Sign     Unable to Pay for Housing in the Last Year: No     Homeless in the Last Year: No       Precautions:     Allergies as of 11/15/2024 - Reviewed 11/15/2024   Allergen Reaction Noted    Ciprofloxacin Itching 07/18/2020    Iodine  01/10/2020    Pcn [penicillins]  11/14/2014       WOC Assessment Details/Treatment     Patient known to IP wound care from this admission-  last seen 11/25. -seen today for consult placed by MD for NPWT mgmt. L AKA vac dsg last changed 11/27 by Vascular team. Plan for IP wound care to manage dsg changes going forward- per primary MD, in process of obtaining home vac system for discharge. -assisted with form information. Reviewed with pt plan for wound vac change/ home vac- voiced understanding. Repositioned to comfort- pillow support in place for continued pressure redistribution.    Reviewed chart for this encounter.  See flowsheet for findings.    Discussed POC with patient and primary nurse.    Bedside nursing to continue care and monitoring.  Bedside to maintain pressure injury prevention interventions.  Current documented Carlos score is 13 with a nutrition sub-scale score of 3.     11/29/24 1500   WOCN Assessment   WOCN Total Time (mins) 30   Visit Date 11/29/24   Visit Time 1500   Consult Type New;Follow Up   WOCN Speciality Wound   Intervention chart review;assessed;coordination of care   Teaching on-going  (pt- plan for wound vac dsg change- and home vac for discharge.)     Will continue to follow as needed and/ or as directed until discharge.     Sharri Ley BSN, RN, CWOCN

## 2024-11-30 NOTE — PROGRESS NOTES
[facility] Medicine  Progress Note    Patient Name: Suyapa Connelly  MRN: [id number]  Patient Class: IP- Inpatient   Admission Date: 11/15/2024  Length of Stay: 13 days  Attending Physician: Dimitris Rojo III*  Primary Care Provider: Vanessa Noel MD        Subjective:     Principal Problem:Cellulitis of left thigh        HPI:  Per admitting physician (11/15):  58yo female whose PMH includes bilateral BKA, ESRD, DM, HTN, HLD presents with purulence from L stump wound.  She was discharged from this facility 11.12.24 with oral abx or LLE cellulitis after bring admitted for septic shock.  During that admission general surgery was consulted for further evaluation d/t concerns for nec fasc as there was subcutaneous emphysema noted on CT; after their clinical assessment nec fasc was ruled out.  She did receive IV abx during that admission and on discharge was transitioned to oral augmentin and doxy and she went to SNF.  She was sent back to the ED for continued purulence and LLE pain.  Surgery was consulted in the ED and have no plans for intervention at this time.  MR is pending.  White count is normal.  She has been admitted to medicine for further management.    Overview/Hospital Course:  Per prior attending physician, Dr. Traore:  Admitted for LLE cellulitis; failed outpatient oral abx.  Discharged from this facility 11.12.24 with oral abx or LLE cellulitis after bring admitted for septic shock; on discharge was transitioned to oral augmentin and doxy and she went to SNF.  Sent back to the ED for continued purulence and LLE pain.  Surgery was consulted in the ED and have no plans for intervention at this time.  Repeat CT here showed postop change of bilateral above knee amputation, no destructive osseous process to suggest osteomyelitis in the remaining portions of the left femur or in the visualized proximal portion of the cut right femur, skin defect along the medial aspect of the  left thigh with foci of air underlying the wound, additional area of soft tissue contour indentation and focal skin thickening along the anterior thigh, suggestive of possible infectious or inflammatory process versus region of scarring, additional extensive soft tissue edema and skin thickening of the pelvis, partially visualized right lower extremity, and left lower extremity.  MR non diagnostic due to motion artifact.  White count is normal.  Receiving vanc/cefepime; ID consulted.  Repeat MR with sedation postoperative changes from above the knee amputation, 3.5 cm fluid collection at the femoral stump, which could represent a seroma, hematoma, or abscess; no evidence of osteomyelitis.  There are two fluid collections in the left groin measuring up to 2.4 cm, diffuse subcutaneous and intermuscular edema, which could be inflammatory or infectious, diffuse muscle atrophy with corresponding edema like signal, which could represent denervation or myositis.  CTA with runoff showed on the left, there is occlusion of the proximal left common iliac artery with reconstitution more distally though again diffuse circumferential severe plaque involves both the internal and external iliac arteries.  The external iliac artery is patent with diffuse plaque approximately 60-80% diameter narrowing.  The left common femoral is patent.  Multiple calcifications at the bifurcation.  Occluded left SFA bypass graft. Native left SFA also is occluded.  Severe plaque extends into the profundus femoral branches.  Only collateral vessels are identified in the left thigh.  Diffuse calcified plaque in the occluded left SFA noted.  Other findings noted in the result report.  Vascular surgery consulted; tentative plan for intervention 11.22.24.    OR 11/22 with vascular surgery:  Aortogram and BLE angiogram with left common iliac stent placement and left AKA stump debridement.  Postoperative anemia requiring 2 units PRBCs 11/23 and 11/24.  BM  reported by  at bedside, brown stool.  Urine saturated pad noted by , no blood.  Contacted vascular surgery morning of 11/24 to evaluate left upper leg, no concern for ongoing bleeding.  S/p wound VAC change 11/27 with vascular surgery.  Plan to complete IV antibiotics on 11/30 following short course of HD.  Discharge home with home health following HD and IV antibiotic pending home wound VAC.    Interval History:  Pt seen and examined during dialysis.  Reports continued LLE discomfort tolerable with pain medications.  Denies any chest pain, shortness breath, abdominal pain, nausea, vomiting.    Labs personally reviewed:  Hb 7.6, Na 127, K 4.1, Cr 3.0    Discussed with Nephrology: HD 11/29 with short course planned for 11/30 to get her on track for outpatient TTS schedule.    Objective:     Vital Signs (Most Recent):  Temp: 97.6 °F (36.4 °C) (11/29/24 1945)  Pulse: 83 (11/29/24 1945)  Resp: 18 (11/29/24 1945)  BP: 104/67 (11/29/24 1945)  SpO2: 96 % (11/29/24 1945) Vital Signs (24h Range):  Temp:  [97.6 °F (36.4 °C)-98.3 °F (36.8 °C)] 97.6 °F (36.4 °C)  Pulse:  [71-87] 83  Resp:  [16-18] 18  SpO2:  [91 %-96 %] 96 %  BP: ()/(44-79) 104/67     Weight: 71 kg (156 lb 8.4 oz)  Body mass index is 47.76 kg/m².    Intake/Output Summary (Last 24 hours) at 11/29/2024 2008  Last data filed at 11/29/2024 1145  Gross per 24 hour   Intake 200 ml   Output 2686 ml   Net -2486 ml           Physical Exam  Cardiovascular:      Rate and Rhythm: Normal rate and regular rhythm.   Pulmonary:      Effort: No respiratory distress.      Breath sounds: Normal breath sounds.   Abdominal:      General: Bowel sounds are normal.      Palpations: Abdomen is soft.   Musculoskeletal:      Cervical back: Normal range of motion.      Left lower leg: Edema present.      Comments: Wound vac is in place on stump draining serosanguineous fluid.   Neurological:      General: No focal deficit present.      Mental Status: She is alert.            Assessment/Plan:      * Cellulitis of left thigh  ID consulted:  Cont vanc/cefepime.  EOT 12/01/2024.  Will need to contact HD to make sure antibiotics can be administered during treatment.  Blood cultures NGTD  Pain control  Wound VAC change 11/27.  Wound care team to manage VAC moving forward.  Will need home wound VAC.    Peripheral vascular disease  S/P Aortogram, bilateral lower extremity angiogram w/ L common iliac stent   Cont statin  D/w vasc, resumed Eliquis with Plavix on 11/25  See plan for cellulitis    Anemia due to chronic kidney disease, on chronic dialysis  Postoperative anemia  Goal directed resuscitation, transfuse for Hgb<7  2 units PRBCs transfused total:  11/23 and 11/24  Asymptomatic  Hb stable    Postoperative anemia  - Transfuse PRBC if patient becomes hemodynamically unstable, symptomatic or H/H drops below 7/21.  - Patient had PRBC 1 unit 11/23 and 11/24  - HH remains stable at this time with no sign of bleeding,    Atherosclerosis of native coronary artery of native heart with angina pectoris  Cont Eliquis and Plavix    Severe obesity (BMI >= 40)  Body mass index is 47.76 kg/m².  Would benefit from dietary and lifestyle modifications in relation to health  Consider dietary/nutrition consult outpatient    ESRD (end stage renal disease)  Nephrology consulted HD management. HD TThS    Chronic combined systolic and diastolic congestive heart failure  TTE 05/2024 EF 15% with diastolic dysfunction.  RV systolic function mildly reduced.  RV wall motion global hypokinesis.  Severe TR.    TTE 11/29 EF 35-40%, hypokinesis of anterior and inferior septum, RV with severely reduced systolic function, moderate to severe TR.  Cont Isordil.  Coreg and hydralazine held, resume when BP will tolerate.  Monitor on telemetry, low-Na diet, fluid restriction.  Volume managed with HD  Will need outpatient cardiology follow up    Hyponatremia  In the setting of ESRD.  To be managed with HD    Paroxysmal  atrial fibrillation  Eliquis resumed 11/25 per discussion with vascular surgery    CAD (coronary artery disease)  Resumed eliquis/plavix 11/25  Cont statin    Type 2 diabetes mellitus with hyperglycemia, with long-term current use of insulin  SSI  Diabetic diet when not NPO  ACHS glucose checks    CAROLIN (generalized anxiety disorder)  Cont zoloft      VTE Risk Mitigation (From admission, onward)           Ordered     apixaban tablet 5 mg  2 times daily         11/25/24 0820     IP VTE HIGH RISK PATIENT  Once         11/20/24 1622     heparin (porcine) injection 1,000 Units  As needed (PRN)         11/16/24 1159     Place sequential compression device  Until discontinued         11/15/24 1648     Reason for No Pharmacological VTE Prophylaxis  Once        Question:  Reasons:  Answer:  Already adequately anticoagulated on oral Anticoagulants    11/15/24 1648                    Discharge Planning   DEVAN: 12/2/2024     Code Status: Full Code   Is the patient medically ready for discharge?:     Reason for patient still in hospital (select all that apply): Patient trending condition, Laboratory test, Treatment, Consult recommendations, and Pending disposition  Discharge Plan A: Home Health   Discharge Delays: None known at this time              Dimitris Rojo III, MD  Department of Hospital Medicine   [facility]

## 2024-11-30 NOTE — ASSESSMENT & PLAN NOTE
- Transfuse PRBC if patient becomes hemodynamically unstable, symptomatic or H/H drops below 7/21.  - Patient had PRBC 1 unit 11/23 and 11/24  - HH remains stable at this time with no sign of bleeding,

## 2024-12-01 PROBLEM — D64.9 POSTOPERATIVE ANEMIA: Status: ACTIVE | Noted: 2024-12-01

## 2024-12-01 LAB
ALBUMIN SERPL BCP-MCNC: 2.1 G/DL (ref 3.5–5.2)
ANION GAP SERPL CALC-SCNC: 6 MMOL/L (ref 8–16)
BUN SERPL-MCNC: 12 MG/DL (ref 6–20)
CALCIUM SERPL-MCNC: 8.4 MG/DL (ref 8.7–10.5)
CHLORIDE SERPL-SCNC: 100 MMOL/L (ref 95–110)
CO2 SERPL-SCNC: 24 MMOL/L (ref 23–29)
CREAT SERPL-MCNC: 2.6 MG/DL (ref 0.5–1.4)
EST. GFR  (NO RACE VARIABLE): 20.7 ML/MIN/1.73 M^2
GLUCOSE SERPL-MCNC: 123 MG/DL (ref 70–110)
MAGNESIUM SERPL-MCNC: 2 MG/DL (ref 1.6–2.6)
PHOSPHATE SERPL-MCNC: 2.6 MG/DL (ref 2.7–4.5)
POCT GLUCOSE: 126 MG/DL (ref 70–110)
POCT GLUCOSE: 170 MG/DL (ref 70–110)
POCT GLUCOSE: 177 MG/DL (ref 70–110)
POCT GLUCOSE: 189 MG/DL (ref 70–110)
POTASSIUM SERPL-SCNC: 3.9 MMOL/L (ref 3.5–5.1)
SODIUM SERPL-SCNC: 130 MMOL/L (ref 136–145)

## 2024-12-01 PROCEDURE — 80069 RENAL FUNCTION PANEL: CPT | Mod: HCNC | Performed by: FAMILY MEDICINE

## 2024-12-01 PROCEDURE — 25000003 PHARM REV CODE 250: Mod: HCNC | Performed by: ANESTHESIOLOGY

## 2024-12-01 PROCEDURE — A4216 STERILE WATER/SALINE, 10 ML: HCPCS | Mod: HCNC | Performed by: ANESTHESIOLOGY

## 2024-12-01 PROCEDURE — 83735 ASSAY OF MAGNESIUM: CPT | Mod: HCNC | Performed by: FAMILY MEDICINE

## 2024-12-01 PROCEDURE — 36415 COLL VENOUS BLD VENIPUNCTURE: CPT | Mod: HCNC | Performed by: FAMILY MEDICINE

## 2024-12-01 PROCEDURE — 21400001 HC TELEMETRY ROOM: Mod: HCNC

## 2024-12-01 PROCEDURE — 25000003 PHARM REV CODE 250: Mod: HCNC | Performed by: FAMILY MEDICINE

## 2024-12-01 PROCEDURE — 25000003 PHARM REV CODE 250: Mod: HCNC

## 2024-12-01 RX ADMIN — Medication 3 ML: at 05:12

## 2024-12-01 RX ADMIN — ISOSORBIDE DINITRATE 5 MG: 5 TABLET ORAL at 09:12

## 2024-12-01 RX ADMIN — Medication: at 09:12

## 2024-12-01 RX ADMIN — MICONAZOLE NITRATE: 20 OINTMENT TOPICAL at 09:12

## 2024-12-01 RX ADMIN — Medication: at 03:12

## 2024-12-01 RX ADMIN — APIXABAN 5 MG: 5 TABLET, FILM COATED ORAL at 09:12

## 2024-12-01 RX ADMIN — CARVEDILOL 3.12 MG: 3.12 TABLET, FILM COATED ORAL at 08:12

## 2024-12-01 RX ADMIN — ATORVASTATIN CALCIUM 80 MG: 40 TABLET, FILM COATED ORAL at 08:12

## 2024-12-01 RX ADMIN — SERTRALINE HYDROCHLORIDE 100 MG: 50 TABLET ORAL at 08:12

## 2024-12-01 RX ADMIN — Medication 3 ML: at 12:12

## 2024-12-01 RX ADMIN — CARVEDILOL 3.12 MG: 3.12 TABLET, FILM COATED ORAL at 09:12

## 2024-12-01 RX ADMIN — Medication 3 ML: at 06:12

## 2024-12-01 RX ADMIN — MICONAZOLE NITRATE: 20 OINTMENT TOPICAL at 03:12

## 2024-12-01 RX ADMIN — APIXABAN 5 MG: 5 TABLET, FILM COATED ORAL at 08:12

## 2024-12-01 RX ADMIN — CLOPIDOGREL BISULFATE 75 MG: 75 TABLET ORAL at 08:12

## 2024-12-01 RX ADMIN — OXYCODONE AND ACETAMINOPHEN 1 TABLET: 10; 325 TABLET ORAL at 03:12

## 2024-12-01 RX ADMIN — ISOSORBIDE DINITRATE 5 MG: 5 TABLET ORAL at 08:12

## 2024-12-01 RX ADMIN — CALCITRIOL 0.5 MCG: 0.5 CAPSULE, LIQUID FILLED ORAL at 08:12

## 2024-12-01 RX ADMIN — Medication 6 MG: at 09:12

## 2024-12-01 RX ADMIN — OXYCODONE AND ACETAMINOPHEN 1 TABLET: 10; 325 TABLET ORAL at 09:12

## 2024-12-01 RX ADMIN — PREGABALIN 75 MG: 75 CAPSULE ORAL at 05:12

## 2024-12-01 NOTE — PROGRESS NOTES
Therapy with Vancomycin complete and/or consult discontinued by provider.  Pharmacy will sign off, please re-consult as needed.  Thank you.

## 2024-12-01 NOTE — SUBJECTIVE & OBJECTIVE
Interval History:  Pt says LLE pain is a little bit better today.  Edema seems to be improving.  No chest pain, SOB, abdominal pain.  Awaiting home wound VAC for discharge.    Labs personally reviewed:  Na 130, K 3.9, BUN 12, Cr 2.6    Discussed with Nephrology:  Plan for hemodialysis 11/30 then resume TTS scheduled.    Objective:     Vital Signs (Most Recent):  Temp: 98.4 °F (36.9 °C) (12/01/24 1055)  Pulse: 75 (12/01/24 1127)  Resp: 16 (12/01/24 1055)  BP: (!) 150/70 (12/01/24 1055)  SpO2: (!) 94 % (12/01/24 1055) Vital Signs (24h Range):  Temp:  [97.2 °F (36.2 °C)-98.4 °F (36.9 °C)] 98.4 °F (36.9 °C)  Pulse:  [75-89] 75  Resp:  [16-18] 16  SpO2:  [94 %-100 %] 94 %  BP: (108-150)/(53-74) 150/70     Weight: 71 kg (156 lb 8.4 oz)  Body mass index is 47.76 kg/m².    Intake/Output Summary (Last 24 hours) at 12/1/2024 1230  Last data filed at 11/30/2024 2000  Gross per 24 hour   Intake 540 ml   Output --   Net 540 ml           Physical Exam  Cardiovascular:      Rate and Rhythm: Normal rate and regular rhythm.   Pulmonary:      Effort: No respiratory distress.      Breath sounds: Normal breath sounds.   Abdominal:      General: Bowel sounds are normal.      Palpations: Abdomen is soft.   Musculoskeletal:      Cervical back: Normal range of motion.      Left lower leg: Edema present.      Comments: Wound vac is in place on stump draining serosanguineous fluid.   Neurological:      General: No focal deficit present.      Mental Status: She is alert.

## 2024-12-01 NOTE — PROGRESS NOTES
[facility] Medicine  Progress Note    Patient Name: Suyapa Connelly  MRN: [id number]  Patient Class: IP- Inpatient   Admission Date: 11/15/2024  Length of Stay: 15 days  Attending Physician: Dimitris Rojo III*  Primary Care Provider: Vanessa Noel MD        Subjective:     Principal Problem:Cellulitis of left thigh        HPI:  Per admitting physician (11/15):  60yo female whose PMH includes bilateral BKA, ESRD, DM, HTN, HLD presents with purulence from L stump wound.  She was discharged from this facility 11.12.24 with oral abx or LLE cellulitis after bring admitted for septic shock.  During that admission general surgery was consulted for further evaluation d/t concerns for nec fasc as there was subcutaneous emphysema noted on CT; after their clinical assessment nec fasc was ruled out.  She did receive IV abx during that admission and on discharge was transitioned to oral augmentin and doxy and she went to SNF.  She was sent back to the ED for continued purulence and LLE pain.  Surgery was consulted in the ED and have no plans for intervention at this time.  MR is pending.  White count is normal.  She has been admitted to medicine for further management.    Overview/Hospital Course:  Admitted for failed outpatient treatment of LLE cellulitis.  Previously admitted to this facility 10/28-11/12 for septic shock d/t LLE cellulitis and was discharged to SNF on Augmentin and doxycycline.  Continued purulence and LLE pain at facility and pt readmitted for ongoing infection.  ID consulted, treated with vanc and cefepime.  MRI revealed multiple fluid collections of the LLE and no evidence of osteomyelitis.  Vascular imaging revealed PAD, specifically left common iliac occlusion.  Vascular surgery consulted, s/p Aortogram and BLE angiogram with left common iliac stent placement and left AKA stump debridement on 11/22.  Postoperative anemia requiring 2 units PRBCs 11/23 and 11/24.  No blood in  stool or urine.  Contacted vascular surgery morning of 11/24 to evaluate left upper leg, no concern for ongoing bleeding.  S/p wound VAC change 11/27 with vascular surgery.  S/p course of vanc and cefepime.  CM/SW assisting with home wound VAC, can discharge home with home health once obtained.  Pt and  decline discharge to facility.    Interval History:  Pt says LLE pain is a little bit better today.  Edema seems to be improving.  No chest pain, SOB, abdominal pain.  Awaiting home wound VAC for discharge.    Labs personally reviewed:  Na 130, K 3.9, BUN 12, Cr 2.6    Discussed with Nephrology:  Plan for hemodialysis 11/30 then resume TTS scheduled.    Objective:     Vital Signs (Most Recent):  Temp: 98.4 °F (36.9 °C) (12/01/24 1055)  Pulse: 75 (12/01/24 1127)  Resp: 16 (12/01/24 1055)  BP: (!) 150/70 (12/01/24 1055)  SpO2: (!) 94 % (12/01/24 1055) Vital Signs (24h Range):  Temp:  [97.2 °F (36.2 °C)-98.4 °F (36.9 °C)] 98.4 °F (36.9 °C)  Pulse:  [75-89] 75  Resp:  [16-18] 16  SpO2:  [94 %-100 %] 94 %  BP: (108-150)/(53-74) 150/70     Weight: 71 kg (156 lb 8.4 oz)  Body mass index is 47.76 kg/m².    Intake/Output Summary (Last 24 hours) at 12/1/2024 1230  Last data filed at 11/30/2024 2000  Gross per 24 hour   Intake 540 ml   Output --   Net 540 ml           Physical Exam  Cardiovascular:      Rate and Rhythm: Normal rate and regular rhythm.   Pulmonary:      Effort: No respiratory distress.      Breath sounds: Normal breath sounds.   Abdominal:      General: Bowel sounds are normal.      Palpations: Abdomen is soft.   Musculoskeletal:      Cervical back: Normal range of motion.      Left lower leg: Edema present.      Comments: Wound vac is in place on stump draining serosanguineous fluid.   Neurological:      General: No focal deficit present.      Mental Status: She is alert.         Assessment/Plan:      * Cellulitis of left thigh  ID consulted:  S/p vanc/cefepime  Blood cultures NGTD  Pain control  Wound  VAC change 11/27.  Wound care team to manage VAC moving forward.  Stable for discharge once home wound VAC obtained.    Peripheral vascular disease  S/P Aortogram, bilateral lower extremity angiogram w/ L common iliac stent   Cont statin  D/w vasc, resumed Eliquis with Plavix on 11/25  See plan for cellulitis    Anemia due to chronic kidney disease, on chronic dialysis  Postoperative anemia  No evidence of active bleeding  transfuse for Hgb<7, symptomatic or hemodynamic instability  2 units PRBCs transfused total:  11/23 and 11/24  Asymptomatic  Hb stable    Atherosclerosis of native coronary artery of native heart with angina pectoris  Cont Eliquis and Plavix    Severe obesity (BMI >= 40)  Body mass index is 47.76 kg/m².  Would benefit from dietary and lifestyle modifications in relation to health  Consider dietary/nutrition consult outpatient    ESRD (end stage renal disease)  Nephrology consulted HD management. HD TThS    Chronic combined systolic and diastolic congestive heart failure  TTE 05/2024 EF 15% with diastolic dysfunction.  RV systolic function mildly reduced.  RV wall motion global hypokinesis.  Severe TR.    TTE 11/29 EF 35-40%, hypokinesis of anterior and inferior septum, RV with severely reduced systolic function, moderate to severe TR.  Cont Isordil and Coreg.  Home hydralazine held, resume when BP will tolerate.  Monitor on telemetry, low-Na diet, fluid restriction.  Volume managed with HD  Will need outpatient cardiology follow up    Hyponatremia  In the setting of ESRD.  To be managed with HD    Paroxysmal atrial fibrillation  Eliquis resumed 11/25 per discussion with vascular surgery    CAD (coronary artery disease)  Resumed eliquis/plavix 11/25  Cont statin    Type 2 diabetes mellitus with hyperglycemia, with long-term current use of insulin  SSI  Diabetic diet when not NPO  ACHS glucose checks    CAROLIN (generalized anxiety disorder)  Cont zoloft      VTE Risk Mitigation (From admission, onward)            Ordered     apixaban tablet 5 mg  2 times daily         11/25/24 0820     IP VTE HIGH RISK PATIENT  Once         11/20/24 1622     heparin (porcine) injection 1,000 Units  As needed (PRN)         11/16/24 1159     Place sequential compression device  Until discontinued         11/15/24 1648     Reason for No Pharmacological VTE Prophylaxis  Once        Question:  Reasons:  Answer:  Already adequately anticoagulated on oral Anticoagulants    11/15/24 1648                    Discharge Planning   DEVAN: 12/2/2024     Code Status: Full Code   Is the patient medically ready for discharge?:     Reason for patient still in hospital (select all that apply): Patient trending condition, Consult recommendations, and Pending disposition  Discharge Plan A: Home Health   Discharge Delays: None known at this time              Dimitris Rojo III, MD  Department of Hospital Medicine   [facility]

## 2024-12-01 NOTE — ASSESSMENT & PLAN NOTE
ID consulted:  S/p vanc/cefepime  Blood cultures NGTD  Pain control  Wound VAC change 11/27.  Wound care team to manage VAC moving forward.  Stable for discharge once home wound VAC obtained.

## 2024-12-01 NOTE — ASSESSMENT & PLAN NOTE
Postoperative anemia  No evidence of active bleeding  transfuse for Hgb<7, symptomatic or hemodynamic instability  2 units PRBCs transfused total:  11/23 and 11/24  Asymptomatic  Hb stable

## 2024-12-02 LAB
ALBUMIN SERPL BCP-MCNC: 2 G/DL (ref 3.5–5.2)
ANION GAP SERPL CALC-SCNC: 9 MMOL/L (ref 8–16)
BUN SERPL-MCNC: 20 MG/DL (ref 6–20)
CALCIUM SERPL-MCNC: 8.5 MG/DL (ref 8.7–10.5)
CHLORIDE SERPL-SCNC: 98 MMOL/L (ref 95–110)
CO2 SERPL-SCNC: 21 MMOL/L (ref 23–29)
CREAT SERPL-MCNC: 3.8 MG/DL (ref 0.5–1.4)
ERYTHROCYTE [DISTWIDTH] IN BLOOD BY AUTOMATED COUNT: 20.3 % (ref 11.5–14.5)
EST. GFR  (NO RACE VARIABLE): 13.2 ML/MIN/1.73 M^2
GLUCOSE SERPL-MCNC: 134 MG/DL (ref 70–110)
HCT VFR BLD AUTO: 26 % (ref 37–48.5)
HGB BLD-MCNC: 8.1 G/DL (ref 12–16)
MAGNESIUM SERPL-MCNC: 2 MG/DL (ref 1.6–2.6)
MCH RBC QN AUTO: 31.3 PG (ref 27–31)
MCHC RBC AUTO-ENTMCNC: 31.2 G/DL (ref 32–36)
MCV RBC AUTO: 100 FL (ref 82–98)
PHOSPHATE SERPL-MCNC: 3.5 MG/DL (ref 2.7–4.5)
PLATELET # BLD AUTO: 290 K/UL (ref 150–450)
PMV BLD AUTO: 10.6 FL (ref 9.2–12.9)
POCT GLUCOSE: 111 MG/DL (ref 70–110)
POCT GLUCOSE: 152 MG/DL (ref 70–110)
POTASSIUM SERPL-SCNC: 3.9 MMOL/L (ref 3.5–5.1)
RBC # BLD AUTO: 2.59 M/UL (ref 4–5.4)
SODIUM SERPL-SCNC: 128 MMOL/L (ref 136–145)
WBC # BLD AUTO: 6.57 K/UL (ref 3.9–12.7)

## 2024-12-02 PROCEDURE — 21400001 HC TELEMETRY ROOM: Mod: HCNC

## 2024-12-02 PROCEDURE — 94761 N-INVAS EAR/PLS OXIMETRY MLT: CPT | Mod: HCNC

## 2024-12-02 PROCEDURE — 83735 ASSAY OF MAGNESIUM: CPT | Mod: HCNC | Performed by: FAMILY MEDICINE

## 2024-12-02 PROCEDURE — 85027 COMPLETE CBC AUTOMATED: CPT | Mod: HCNC | Performed by: FAMILY MEDICINE

## 2024-12-02 PROCEDURE — 36415 COLL VENOUS BLD VENIPUNCTURE: CPT | Mod: HCNC | Performed by: FAMILY MEDICINE

## 2024-12-02 PROCEDURE — 25000003 PHARM REV CODE 250: Mod: HCNC

## 2024-12-02 PROCEDURE — 25000003 PHARM REV CODE 250: Mod: HCNC | Performed by: ANESTHESIOLOGY

## 2024-12-02 PROCEDURE — A4216 STERILE WATER/SALINE, 10 ML: HCPCS | Mod: HCNC | Performed by: ANESTHESIOLOGY

## 2024-12-02 PROCEDURE — 25000003 PHARM REV CODE 250: Mod: HCNC | Performed by: FAMILY MEDICINE

## 2024-12-02 PROCEDURE — 63600175 PHARM REV CODE 636 W HCPCS: Mod: JZ,JG,HCNC | Performed by: NURSE PRACTITIONER

## 2024-12-02 PROCEDURE — 63600175 PHARM REV CODE 636 W HCPCS: Mod: HCNC

## 2024-12-02 PROCEDURE — 80069 RENAL FUNCTION PANEL: CPT | Mod: HCNC | Performed by: FAMILY MEDICINE

## 2024-12-02 PROCEDURE — 63600175 PHARM REV CODE 636 W HCPCS: Mod: HCNC | Performed by: FAMILY MEDICINE

## 2024-12-02 RX ORDER — SODIUM CHLORIDE 9 MG/ML
INJECTION, SOLUTION INTRAVENOUS ONCE
Status: CANCELLED | OUTPATIENT
Start: 2024-12-03

## 2024-12-02 RX ADMIN — PREGABALIN 75 MG: 75 CAPSULE ORAL at 06:12

## 2024-12-02 RX ADMIN — CALCITRIOL 0.5 MCG: 0.5 CAPSULE, LIQUID FILLED ORAL at 08:12

## 2024-12-02 RX ADMIN — CARVEDILOL 3.12 MG: 3.12 TABLET, FILM COATED ORAL at 08:12

## 2024-12-02 RX ADMIN — CINACALCET HYDROCHLORIDE 30 MG: 30 TABLET, FILM COATED ORAL at 06:12

## 2024-12-02 RX ADMIN — MICONAZOLE NITRATE: 20 OINTMENT TOPICAL at 08:12

## 2024-12-02 RX ADMIN — Medication: at 09:12

## 2024-12-02 RX ADMIN — MICONAZOLE NITRATE: 20 OINTMENT TOPICAL at 09:12

## 2024-12-02 RX ADMIN — ATORVASTATIN CALCIUM 80 MG: 40 TABLET, FILM COATED ORAL at 08:12

## 2024-12-02 RX ADMIN — HYDROMORPHONE HYDROCHLORIDE 0.5 MG: 1 INJECTION, SOLUTION INTRAMUSCULAR; INTRAVENOUS; SUBCUTANEOUS at 02:12

## 2024-12-02 RX ADMIN — Medication: at 10:12

## 2024-12-02 RX ADMIN — ISOSORBIDE DINITRATE 5 MG: 5 TABLET ORAL at 01:12

## 2024-12-02 RX ADMIN — Medication 3 ML: at 11:12

## 2024-12-02 RX ADMIN — ALLOPURINOL 50 MG: 300 TABLET ORAL at 08:12

## 2024-12-02 RX ADMIN — CLOPIDOGREL BISULFATE 75 MG: 75 TABLET ORAL at 08:12

## 2024-12-02 RX ADMIN — Medication 3 ML: at 12:12

## 2024-12-02 RX ADMIN — ISOSORBIDE DINITRATE 5 MG: 5 TABLET ORAL at 09:12

## 2024-12-02 RX ADMIN — Medication 6 MG: at 09:12

## 2024-12-02 RX ADMIN — SERTRALINE HYDROCHLORIDE 100 MG: 50 TABLET ORAL at 08:12

## 2024-12-02 RX ADMIN — EPOETIN ALFA-EPBX 3560 UNITS: 4000 INJECTION, SOLUTION INTRAVENOUS; SUBCUTANEOUS at 06:12

## 2024-12-02 RX ADMIN — OXYCODONE AND ACETAMINOPHEN 1 TABLET: 10; 325 TABLET ORAL at 01:12

## 2024-12-02 RX ADMIN — APIXABAN 5 MG: 5 TABLET, FILM COATED ORAL at 09:12

## 2024-12-02 RX ADMIN — CARVEDILOL 3.12 MG: 3.12 TABLET, FILM COATED ORAL at 09:12

## 2024-12-02 RX ADMIN — APIXABAN 5 MG: 5 TABLET, FILM COATED ORAL at 08:12

## 2024-12-02 RX ADMIN — Medication 3 ML: at 05:12

## 2024-12-02 NOTE — PROGRESS NOTES
Andrew Andrade - [facility] Surg  Wound Care    Patient Name:  Suyapa Connelly   MRN:  [id number]  Date: 12/2/2024  Diagnosis: Cellulitis of left thigh    History:     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    Osteomyelitis     Osteomyelitis of left foot 04/30/2021    Ulcer of left foot     Vaginal delivery     x1       Social History     Socioeconomic History    Marital status:    Occupational History    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      Social Drivers of Health     Financial Resource Strain: Low Risk  (11/16/2024)    Overall Financial Resource Strain (CARDIA)     Difficulty of Paying Living Expenses: Not hard at all   Recent Concern: Financial Resource Strain - Medium Risk (10/29/2024)    Overall Financial Resource Strain (CARDIA)     Difficulty of Paying Living Expenses: Somewhat hard   Food Insecurity: No Food Insecurity (11/16/2024)    Hunger Vital Sign     Worried About Running Out of Food in the Last Year: Never true     Ran Out of Food in the Last Year: Never true   Transportation Needs: No Transportation Needs (11/16/2024)    TRANSPORTATION NEEDS     Transportation : No   Physical Activity: Inactive (11/14/2024)    Exercise Vital Sign     Days of Exercise per Week: 0 days     Minutes of Exercise per Session: 0 min   Stress: No Stress Concern Present (11/16/2024)    [place] [employer] - Occupational Stress Questionnaire     Feeling of Stress : Not  at all   Recent Concern: Stress - Stress Concern Present (10/29/2024)    [place] [employer] - Occupational Stress Questionnaire     Feeling of Stress : To some extent   Housing Stability: Low Risk  (11/16/2024)    Housing Stability Vital Sign     Unable to Pay for Housing in the Last Year: No     Homeless in the Last Year: No       Precautions:     Allergies as of 11/15/2024 - Reviewed 11/15/2024   Allergen Reaction Noted    Ciprofloxacin Itching 07/18/2020    Iodine  01/10/2020    Pcn [penicillins]  11/14/2014       [facility] Assessment Details/Treatment     Patient seen for request per MD/ RN for wound re-assmt and order clarification. Pt awake and premedicated with prn pain med for anticipated discomfort by primary nurse.   Pt with pain expressed with wound vac dressing removal- utlilized adhesive remover and intermittent breaks to allow for ease of removal. Wound with minimal bleeding initially- cleansed with Vashe solution and dressed with  Aquacel Ag and border foam dressing until home vac arrives in attempt for pt comfort- and not to endure two vac dressing changes- home vac will be different system from hospital system. While turning pt to clean from incontinent event- active bleeding noted from L thigh wound -localized site to small area near 6:00 position- held pressure for approx 15min- Dr Rojo contact attempt via secure chat: no new orders at this time. New dressing of Aquacel Ag and foam applied after bleeding stopped. Pt repositioned to comfort with pillow support. BERE bed surface remains in place with pt stated comfort.    Reviewed chart for this encounter.  See flowsheet for findings.      Recommendations:  Buttocks/ L ischium- MASD v. IAD: continue zinc moisture barrier twice daily and as needed after each incontinent event.    B groin/ abdominal skin fold- intertrigo: AF barrier to skin folds twice daily.    L AKA/ thigh- surgical site: NPWT with nonadherent dressing to wound  base for pt comfort  2x/ week. Settings @ 100 mmHg continuous suction with low intensity pressure.      Discussed POC with patient and primary nurse.  See EMR for orders and patient education.    Bedside nursing to continue care and monitoring.  Bedside to maintain pressure injury prevention interventions.  Current documented Carlos score is 13 with a nutrition sub-scale score of 3.   12/02/24 1415   WOCN Assessment   WOCN Total Time (mins) 60   Visit Date 12/02/24   Visit Time 1415   Consult Type Follow Up   WOCN Speciality Wound   Intervention chart review;changed;assessed;orders   Teaching on-going  (pt/ primary nurse present at bedside: wound vac removed from L AKA for final assmt and dressing change until home wound vac system received.)   Skin Interventions   Skin Protection incontinence pads utilized   Positioning   Body Position turned   Head of Bed (HOB) Positioning HOB elevated   Positioning/Transfer Devices pillows   Pressure Injury Prevention    Check Moisture Management Pad Done   Sacral Foam Dressing Remove  (pt incontinent- rec for moisture barrier.)   Check Medical Devices Done        Wound 11/22/24 1634 Incision Left Thigh   Date First Assessed/Time First Assessed: 11/22/24 1634   Primary Wound Type: Incision  Side: Left  Location: (c) Thigh  Closure Method: (c) Sutures;Other (see comments)   Wound Image 15 x 12.4 x 1.3 cm   Dressing Appearance Saturated   Drainage Amount Moderate   Drainage Characteristics/Odor Bleeding controlled  (after hand held pressure applied for 15 min.)   Appearance Red;Pink;Yellow   Periwound Area Dry;Intact   Wound Edges Open   Care Cleansed with:;Antimicrobial agent  (Vashe solution)   Dressing Changed;Silver;Hydrofiber;Silicone;Foam   Dressing Change Due 12/03/24        Negative Pressure Wound Therapy  11/22/24 1635 Left   Placement Date/Time: 11/22/24 1635   Side: Left  Location: (c) Thigh   NPWT Type Vacuum Therapy   Therapy Setting NPWT Vacuum off     Photo taken for  reference:   Buttocks/ Left ischium     Will continue to follow as needed and/ or as directed until discharge.    Sharri Ley BSN, RN, CWOCN  12/02/2024    1600- dressing at L aka remains CDI. Pt resting with eyes closed- no apparent distress noted. Primary nurse updated.

## 2024-12-02 NOTE — PLAN OF CARE
12/02/24 1511   Post-Acute Status   Post-Acute Authorization Home Health   Home Health Status Set-up Complete/Auth obtained   Discharge Delays None known at this time   Discharge Plan   Discharge Plan A Home;Home Health   Discharge Plan B Home Health;Home with family     Pt awaiting wound vac, CRYSTAL verified faxed received to [employer] faxed to [phone], also emailed to [email or web address]. Awaiting approval, [facility][1] accepted for  services. Will cont to follow until dis charged.  1645: CRYSTAL spoke with pt's  (Randell Connelly) regarding discharge and update on wound vac.    Luisana Hurst, RN Case Manager  [phone]

## 2024-12-03 LAB
ALBUMIN SERPL BCP-MCNC: 2 G/DL (ref 3.5–5.2)
ANION GAP SERPL CALC-SCNC: 9 MMOL/L (ref 8–16)
BUN SERPL-MCNC: 23 MG/DL (ref 6–20)
CALCIUM SERPL-MCNC: 8.3 MG/DL (ref 8.7–10.5)
CHLORIDE SERPL-SCNC: 98 MMOL/L (ref 95–110)
CO2 SERPL-SCNC: 19 MMOL/L (ref 23–29)
CREAT SERPL-MCNC: 4.3 MG/DL (ref 0.5–1.4)
EST. GFR  (NO RACE VARIABLE): 11.3 ML/MIN/1.73 M^2
GLUCOSE SERPL-MCNC: 108 MG/DL (ref 70–110)
MAGNESIUM SERPL-MCNC: 2 MG/DL (ref 1.6–2.6)
PHOSPHATE SERPL-MCNC: 4.3 MG/DL (ref 2.7–4.5)
POCT GLUCOSE: 137 MG/DL (ref 70–110)
POCT GLUCOSE: 145 MG/DL (ref 70–110)
POCT GLUCOSE: 156 MG/DL (ref 70–110)
POCT GLUCOSE: 165 MG/DL (ref 70–110)
POTASSIUM SERPL-SCNC: 4.3 MMOL/L (ref 3.5–5.1)
SODIUM SERPL-SCNC: 126 MMOL/L (ref 136–145)

## 2024-12-03 PROCEDURE — 90935 HEMODIALYSIS ONE EVALUATION: CPT | Mod: HCNC

## 2024-12-03 PROCEDURE — A4216 STERILE WATER/SALINE, 10 ML: HCPCS | Mod: HCNC | Performed by: ANESTHESIOLOGY

## 2024-12-03 PROCEDURE — 36415 COLL VENOUS BLD VENIPUNCTURE: CPT | Mod: HCNC | Performed by: FAMILY MEDICINE

## 2024-12-03 PROCEDURE — 25000003 PHARM REV CODE 250: Mod: HCNC | Performed by: FAMILY MEDICINE

## 2024-12-03 PROCEDURE — 63600175 PHARM REV CODE 636 W HCPCS: Mod: HCNC

## 2024-12-03 PROCEDURE — 25000003 PHARM REV CODE 250: Mod: HCNC

## 2024-12-03 PROCEDURE — 83735 ASSAY OF MAGNESIUM: CPT | Mod: HCNC | Performed by: FAMILY MEDICINE

## 2024-12-03 PROCEDURE — 25000003 PHARM REV CODE 250: Mod: HCNC | Performed by: ANESTHESIOLOGY

## 2024-12-03 PROCEDURE — 80069 RENAL FUNCTION PANEL: CPT | Mod: HCNC | Performed by: FAMILY MEDICINE

## 2024-12-03 PROCEDURE — 90935 HEMODIALYSIS ONE EVALUATION: CPT | Mod: HCNC,,, | Performed by: NURSE PRACTITIONER

## 2024-12-03 PROCEDURE — 21400001 HC TELEMETRY ROOM: Mod: HCNC

## 2024-12-03 RX ORDER — MUPIROCIN 20 MG/G
OINTMENT TOPICAL 2 TIMES DAILY
Status: DISCONTINUED | OUTPATIENT
Start: 2024-12-03 | End: 2024-12-05 | Stop reason: HOSPADM

## 2024-12-03 RX ADMIN — MICONAZOLE NITRATE: 20 OINTMENT TOPICAL at 08:12

## 2024-12-03 RX ADMIN — APIXABAN 5 MG: 5 TABLET, FILM COATED ORAL at 10:12

## 2024-12-03 RX ADMIN — CARVEDILOL 3.12 MG: 3.12 TABLET, FILM COATED ORAL at 10:12

## 2024-12-03 RX ADMIN — MUPIROCIN: 20 OINTMENT TOPICAL at 10:12

## 2024-12-03 RX ADMIN — ISOSORBIDE DINITRATE 5 MG: 5 TABLET ORAL at 10:12

## 2024-12-03 RX ADMIN — Medication 3 ML: at 05:12

## 2024-12-03 RX ADMIN — PREGABALIN 75 MG: 75 CAPSULE ORAL at 04:12

## 2024-12-03 RX ADMIN — Medication 6 MG: at 10:12

## 2024-12-03 RX ADMIN — HEPARIN SODIUM 1000 UNITS: 1000 INJECTION, SOLUTION INTRAVENOUS; SUBCUTANEOUS at 10:12

## 2024-12-03 RX ADMIN — Medication: at 08:12

## 2024-12-03 NOTE — PROGRESS NOTES
[facility] HEMODIALYSIS NOTE     Patient currently on hemodialysis for removal of uremic toxins and volume.     Patient seen and evaluated on hemodialysis, tolerating treatment, see HD flowsheet for vitals and assessments.    Labs have been reviewed and the dialysate bath has been adjusted.       Assessment/Plan:    -Hyponatremia in setting of ESRD. Na bath adjusted. Recommend 800 ml fluid restrictions. Discussed restricting fluid including ice.   -Patient seen on HD, tolerating treatment well, w/o complaints   -UF goal of 2.5L  -Renal diet, if not NPO   -Strict I/O's and daily weights  -Daily renal function panels  -Keep MAP >65 while on HD   -Hgb goal 10-11, continue epo   -continue calcitriol, cinacalcet   -Will continue to follow while inpatient     Katie Monique DNP-FNP, C  Nephrology  Pager: [phone]

## 2024-12-03 NOTE — SUBJECTIVE & OBJECTIVE
Interval History:  Continued LLE pain tolerable with pain medications.  Denies any subjective fevers, chest pain, SOB.    Labs personally reviewed:  Hb 8.1, Na 128, K 3.9, BUN 20, Cr 3.8    Remains admitted to hospital medicine while awaiting insurance approval for home wound VAC as recommended by vascular surgery.    Objective:     Vital Signs (Most Recent):  Temp: 97.2 °F (36.2 °C) (12/02/24 1958)  Pulse: 94 (12/02/24 1958)  Resp: 18 (12/02/24 1958)  BP: 118/64 (12/02/24 1958)  SpO2: 100 % (12/02/24 1958) Vital Signs (24h Range):  Temp:  [97.2 °F (36.2 °C)-98.5 °F (36.9 °C)] 97.2 °F (36.2 °C)  Pulse:  [71-94] 94  Resp:  [17-18] 18  SpO2:  [93 %-100 %] 100 %  BP: (102-138)/(54-75) 118/64     Weight: 71 kg (156 lb 8.4 oz)  Body mass index is 47.76 kg/m².    Intake/Output Summary (Last 24 hours) at 12/2/2024 2110  Last data filed at 12/2/2024 0539  Gross per 24 hour   Intake 360 ml   Output --   Net 360 ml           Physical Exam  Cardiovascular:      Rate and Rhythm: Normal rate and regular rhythm.   Pulmonary:      Effort: No respiratory distress.      Breath sounds: Normal breath sounds.   Abdominal:      General: Bowel sounds are normal.      Palpations: Abdomen is soft.   Musculoskeletal:      Cervical back: Normal range of motion.      Left lower leg: Edema present.      Comments: Wound vac is in place on stump draining serosanguineous fluid.   Neurological:      General: No focal deficit present.      Mental Status: She is alert.

## 2024-12-03 NOTE — PLAN OF CARE
Recommendations  --Continue Renal Fluid - 1000 mL diet as tolerated and clinically indicated   --Continue Novasource Renal daily   --Encourage good intakes   --Nursing: please continue to document % meal eaten on flowsheet   --RD to monitor weight, PO intake     Goals: Meet % EEN/EPN   Nutrition Goal Status: new  Communication of RD Recs: other (comment) (POC)

## 2024-12-03 NOTE — CARE UPDATE
Unit AVA Care Support Interaction      I have reviewed the chart of Suyapa Connelly who is hospitalized for Cellulitis of left thigh. The patient is currently located in the following unit: MSU        I have assisted the primary physician in management of the following:      MRSA Decolonization - Mupirocin ordered and CHG ordered        Payton Rodríguez PA-C  Unit Based AVA

## 2024-12-03 NOTE — PROGRESS NOTES
[facility] Medicine  Progress Note    Patient Name: Suyapa Connelly  MRN: [id number]  Patient Class: IP- Inpatient   Admission Date: 11/15/2024  Length of Stay: 16 days  Attending Physician: Dimitris Rojo III*  Primary Care Provider: Vanessa Noel MD        Subjective     Principal Problem:Cellulitis of left thigh        HPI:  Per admitting physician (11/15):  60yo female whose PMH includes bilateral BKA, ESRD, DM, HTN, HLD presents with purulence from L stump wound.  She was discharged from this facility 11.12.24 with oral abx or LLE cellulitis after bring admitted for septic shock.  During that admission general surgery was consulted for further evaluation d/t concerns for nec fasc as there was subcutaneous emphysema noted on CT; after their clinical assessment nec fasc was ruled out.  She did receive IV abx during that admission and on discharge was transitioned to oral augmentin and doxy and she went to SNF.  She was sent back to the ED for continued purulence and LLE pain.  Surgery was consulted in the ED and have no plans for intervention at this time.  MR is pending.  White count is normal.  She has been admitted to medicine for further management.    Overview/Hospital Course:  Admitted for failed outpatient treatment of LLE cellulitis.  Previously admitted to this facility 10/28-11/12 for septic shock d/t LLE cellulitis and was discharged to SNF on Augmentin and doxycycline.  Continued purulence and LLE pain at facility and pt readmitted for ongoing infection.  ID consulted, treated with vanc and cefepime.  MRI revealed multiple fluid collections of the LLE and no evidence of osteomyelitis.  Vascular imaging revealed PAD, specifically left common iliac occlusion.  Vascular surgery consulted, s/p Aortogram and BLE angiogram with left common iliac stent placement and left AKA stump debridement on 11/22.  Postoperative anemia requiring 2 units PRBCs 11/23 and 11/24.  No blood in  stool or urine.  Contacted vascular surgery morning of 11/24 to evaluate left upper leg, no concern for ongoing bleeding.  S/p wound VAC change 11/27 with vascular surgery.  S/p course of vanc and cefepime.  CM/SW assisting with home wound VAC, can discharge home with home health once obtained.  Pt and  decline discharge to facility.    Interval History:  Continued LLE pain tolerable with pain medications.  Denies any subjective fevers, chest pain, SOB.    Labs personally reviewed:  Hb 8.1, Na 128, K 3.9, BUN 20, Cr 3.8    Remains admitted to hospital medicine while awaiting insurance approval for home wound VAC as recommended by vascular surgery.    Objective:     Vital Signs (Most Recent):  Temp: 97.2 °F (36.2 °C) (12/02/24 1958)  Pulse: 94 (12/02/24 1958)  Resp: 18 (12/02/24 1958)  BP: 118/64 (12/02/24 1958)  SpO2: 100 % (12/02/24 1958) Vital Signs (24h Range):  Temp:  [97.2 °F (36.2 °C)-98.5 °F (36.9 °C)] 97.2 °F (36.2 °C)  Pulse:  [71-94] 94  Resp:  [17-18] 18  SpO2:  [93 %-100 %] 100 %  BP: (102-138)/(54-75) 118/64     Weight: 71 kg (156 lb 8.4 oz)  Body mass index is 47.76 kg/m².    Intake/Output Summary (Last 24 hours) at 12/2/2024 2110  Last data filed at 12/2/2024 0539  Gross per 24 hour   Intake 360 ml   Output --   Net 360 ml           Physical Exam  Cardiovascular:      Rate and Rhythm: Normal rate and regular rhythm.   Pulmonary:      Effort: No respiratory distress.      Breath sounds: Normal breath sounds.   Abdominal:      General: Bowel sounds are normal.      Palpations: Abdomen is soft.   Musculoskeletal:      Cervical back: Normal range of motion.      Left lower leg: Edema present.      Comments: Wound vac is in place on stump draining serosanguineous fluid.   Neurological:      General: No focal deficit present.      Mental Status: She is alert.           Assessment and Plan     * Cellulitis of left thigh  ID consulted:  S/p vanc/cefepime  Blood cultures NGTD  Pain control  Wound VAC  change 11/27.  Wound care team to manage VAC moving forward.  Stable for discharge once home wound VAC obtained.    Peripheral vascular disease  S/P Aortogram, bilateral lower extremity angiogram w/ L common iliac stent   Cont statin  D/w vasc, resumed Eliquis with Plavix on 11/25  See plan for cellulitis    Anemia due to chronic kidney disease, on chronic dialysis  Postoperative anemia  No evidence of active bleeding  transfuse for Hgb<7, symptomatic or hemodynamic instability  2 units PRBCs transfused total:  11/23 and 11/24  Asymptomatic  Hb stable    Atherosclerosis of native coronary artery of native heart with angina pectoris  Cont Eliquis and Plavix    Severe obesity (BMI >= 40)  Body mass index is 47.76 kg/m².  Would benefit from dietary and lifestyle modifications in relation to health  Consider dietary/nutrition consult outpatient    ESRD (end stage renal disease)  Nephrology consulted HD management. HD TThS    Chronic combined systolic and diastolic congestive heart failure  TTE 05/2024 EF 15% with diastolic dysfunction.  RV systolic function mildly reduced.  RV wall motion global hypokinesis.  Severe TR.    TTE 11/29 EF 35-40%, hypokinesis of anterior and inferior septum, RV with severely reduced systolic function, moderate to severe TR.  Cont Isordil and Coreg.  Home hydralazine held, resume when BP will tolerate.  Monitor on telemetry, low-Na diet, fluid restriction.  Volume managed with HD  Will need outpatient cardiology follow up    Hyponatremia  In the setting of ESRD.  To be managed with HD    Paroxysmal atrial fibrillation  Eliquis resumed 11/25 per discussion with vascular surgery    CAD (coronary artery disease)  Resumed eliquis/plavix 11/25  Cont statin    Type 2 diabetes mellitus with hyperglycemia, with long-term current use of insulin  SSI  Diabetic diet when not NPO  ACHS glucose checks    CAROLIN (generalized anxiety disorder)  Cont zoloft      VTE Risk Mitigation (From admission, onward)            Ordered     apixaban tablet 5 mg  2 times daily         11/25/24 0820     IP VTE HIGH RISK PATIENT  Once         11/20/24 1622     heparin (porcine) injection 1,000 Units  As needed (PRN)         11/16/24 1159     Place sequential compression device  Until discontinued         11/15/24 1648     Reason for No Pharmacological VTE Prophylaxis  Once        Question:  Reasons:  Answer:  Already adequately anticoagulated on oral Anticoagulants    11/15/24 1648                    Discharge Planning   DEVAN: 12/4/2024     Code Status: Full Code   Is the patient medically ready for discharge?:      Discharge Plan A: Home, Home Health   Discharge Delays: None known at this time                    Dimitris Rojo III, MD  Department of Hospital Medicine   [facility] Surg

## 2024-12-03 NOTE — PROGRESS NOTES
12/03/24 1147 12/03/24 1153        Hemodialysis Catheter left subclavian   No placement date or time found.   Present Prior to Hospital Arrival?: Yes  Location: left subclavian   Site Assessment  --  No drainage;No redness;No swelling;No warmth   Line Securement Device  --  Secured with sutureless device   Dressing Type  --  CHG impregnated dressing/sponge   Dressing Status  --  Clean;Dry;Intact   Dressing Intervention  --  Integrity maintained   Date on Dressing  --  12/03/24   Dressing Due to be Changed  --  12/10/24   Venous Patency/Care  --  deaccessed;flushed w/o difficulty;heparin locked   Arterial Patency/Care  --  deaccessed;flushed w/o difficulty;heparin locked   During Hemodialysis Assessment   Blood Flow Rate (mL/min) 400 mL/min  --    Dialysate Flow Rate (mL/min) 700 ml/min  --    Ultrafiltration Rate (mL/Hr) 880 mL/Hr  --    Arteriovenous Lines Secure Yes  --    Arterial Pressure (mmHg) -130 mmHg  --    Venous Pressure (mmHg) 160  --    Blood Volume Processed (Liters) 80 L  --    UF Removed (mL) 2650 mL  --    TMP 50  --    Venous Line in Air Detector Yes  --    Intake (mL) 250 mL  --    Intra-Hemodialysis Comments HD completed  --    Post-Hemodialysis Assessment   Rinseback Volume (mL) 250 mL  --    Blood Volume Processed (Liters) 80 L  --    Dialyzer Clearance Moderately streaked  --    Duration of Treatment 210 minutes  --    Total UF (mL) 2650 mL  --    Net Fluid Removal 2000  --    Patient Response to Treatment Hollie. well  --      HD completed. Patient left [facility] by bed in [facility].

## 2024-12-03 NOTE — PROGRESS NOTES
12/03/24 0815 12/03/24 0816        Hemodialysis Catheter left subclavian   No placement date or time found.   Present Prior to Hospital Arrival?: Yes  Location: left subclavian   Site Assessment No drainage;No redness;No swelling;No warmth  --    Line Securement Device Secured with sutureless device  --    Dressing Type CHG impregnated dressing/sponge  --    Dressing Status Clean;Dry  (dressing coming off)  --    Dressing Intervention Sterile dressing change  --    Date on Dressing 12/03/24  --    Dressing Due to be Changed 12/10/24  --    Venous Patency/Care accessed;blood return present;flushed w/o difficulty  --    Arterial Patency/Care accessed;blood return present;flushed w/o difficulty  --    During Hemodialysis Assessment   Blood Flow Rate (mL/min)  --  400 mL/min   Dialysate Flow Rate (mL/min)  --  700 ml/min   Ultrafiltration Rate (mL/Hr)  --  880 mL/Hr   Arteriovenous Lines Secure  --  Yes   Arterial Pressure (mmHg)  --  -130 mmHg   Venous Pressure (mmHg)  --  160   Blood Volume Processed (Liters)  --  0 L   UF Removed (mL)  --  0 mL   TMP  --  50   Venous Line in Air Detector  --  Yes   Intake (mL)  --  250 mL   Intra-Hemodialysis Comments  --  HD started     Patient arrived [facility] by bed. HD started(MWF)

## 2024-12-03 NOTE — PROGRESS NOTES
[facility] - Med Surg  Adult Nutrition  Progress Note    SUMMARY       Recommendations  --Continue Renal Fluid - 1000 mL diet as tolerated and clinically indicated   --Continue Novasource Renal daily   --Encourage good intakes   --Nursing: please continue to document % meal eaten on flowsheet   --RD to monitor weight, PO intake     Goals: Meet % EEN/EPN   Nutrition Goal Status: new  Communication of RD Recs: other (comment) (POC)    Assessment and Plan    Nutrition Problem  Inadequate energy intake     Related to (etiology):   Limited food acceptance     Signs and Symptoms (as evidenced by):   Decreased PO intake, 25-50%      Interventions/Recommendations (treatment strategy):  --Continue Renal Fluid - 1000 mL diet as tolerated and clinically indicated   --Continue Novasource Renal daily   --Encourage good intakes   --Nursing: please continue to document % meal eaten on flowsheet   --RD to monitor weight, PO intake    Nutrition Diagnosis Status:   Continues        Reason for Assessment    Reason For Assessment: length of stay  Diagnosis: renal disease, diabetes diagnosis/complications, cardiac disease  General Information Comments: 60yo female whose PMH includes bilateral BKA, ESRD, DM, HTN, HLD presents with purulence from L stump wound. RD follow-up. Noted 25-50% PO intake per chart review. 4.9% weight loss x 6 months - not significant. No concerns for malnutrition at this time. Noted wounds - incision left thigh and incision left groin angiogram puncture.     Nutrition Discharge Planning: Pending clinical course    Nutrition Related Social Determinants of Health: SDOH: Adequate food in home environment      Nutrition Risk Screen    Nutrition Risk Screen: no indicators present    Nutrition/Diet History    Typical Food/Fluid Intake: 3 meals daily with snacks  Food Preferences: Creole foods  Spiritual, Cultural Beliefs, Mormon Practices, Values that Affect Care: no  Supplemental Drinks or Food Habits:  Novasource Renal  Vitamin/Mineral/Herbal Supplements: MVI  Food Allergies: NKFA    Anthropometrics    Temp: 98 °F (36.7 °C)  Height Method: Stated  Height: 4' (121.9 cm)  Height (inches): 48 in  Weight Method: Bed Scale  Weight: 71 kg (156 lb 8.4 oz)  Weight (lb): 156.53 lb  Ideal Body Weight (IBW), Female: 40 lb  % Ideal Body Weight, Female (lb): 391.33 %  BMI (Calculated): 47.8  BMI Grade: greater than 40 - morbid obesity  Amputation %: 5.9, 5.9  Total Amputation %: 11.8  Amputation Ideal Body Weight (IBW), Female (lb): 28.2 lb  Amputee BMI (kg/m2): 54.46 kg/m2       Lab/Procedures/Meds    Pertinent Labs Reviewed: reviewed - hemoglobin 8.1, hematocrit 26, , MCH 31.3, MCHC 31.2, Na 126, BUN 23, creatinine, 4.3, eGFR 11.3, Ca 8.3    Pertinent Medications Reviewed: reviewed  Pertinent Medications Comments: allopurinol, apixaban, atorvastatin, carvedilol, cinacalcet, epoetin nina-epbx    Estimated/Assessed Needs    Weight Used For Calorie Calculations: 71.2 kg (156 lb 15.5 oz)  Energy Calorie Requirements (kcal): 3149-4394 kcal (25-35 kcal/kg (NPIAP))  Energy Need Method: Kcal/kg  Protein Requirements: 86 g (1.2 g/kg (ESDR on Dialysis))  Weight Used For Protein Calculations: 71.2 kg (156 lb 15.5 oz)  Fluid Requirements (mL): 1500 mL  Estimated Fluid Requirement Method: other (see comments) (Fluid restriction)  RDA Method (mL): 1780  CHO Requirement: 200-405 g      Nutrition Prescription Ordered    Current Diet Order: Renal Diet  Nutrition Order Comments: Consider Novasource if intakes <50%    Evaluation of Received Nutrient/Fluid Intake    % Intake of Estimated Energy Needs: 25 - 50 %  % Meal Intake: 25 - 50 %    Nutrition Risk    Level of Risk/Frequency of Follow-up: high     Monitor and Evaluation    Food and Nutrient Intake: energy intake, food and beverage intake  Food and Nutrient Adminstration: diet order  Knowledge/Beliefs/Attitudes: food and nutrition knowledge/skill, beliefs and attitudes  Physical  Activity and Function: nutrition-related ADLs and IADLs  Anthropometric Measurements: weight change, weight, body mass index  Biochemical Data, Medical Tests and Procedures: electrolyte and renal panel, gastrointestinal profile, inflammatory profile, glucose/endocrine profile, lipid profile  Nutrition-Focused Physical Findings: overall appearance, skin     Nutrition Follow-Up    RD Follow-up?: Yes    Fely Guidry, Registration Eligible, Provisional LDN

## 2024-12-04 VITALS
BODY MASS INDEX: 36.22 KG/M2 | TEMPERATURE: 98 F | HEIGHT: 55 IN | OXYGEN SATURATION: 99 % | DIASTOLIC BLOOD PRESSURE: 73 MMHG | WEIGHT: 156.5 LBS | SYSTOLIC BLOOD PRESSURE: 115 MMHG | HEART RATE: 90 BPM | RESPIRATION RATE: 16 BRPM

## 2024-12-04 LAB
ALBUMIN SERPL BCP-MCNC: 2.1 G/DL (ref 3.5–5.2)
ANION GAP SERPL CALC-SCNC: 9 MMOL/L (ref 8–16)
BUN SERPL-MCNC: 17 MG/DL (ref 6–20)
CALCIUM SERPL-MCNC: 7.9 MG/DL (ref 8.7–10.5)
CHLORIDE SERPL-SCNC: 96 MMOL/L (ref 95–110)
CO2 SERPL-SCNC: 25 MMOL/L (ref 23–29)
CREAT SERPL-MCNC: 3.3 MG/DL (ref 0.5–1.4)
ERYTHROCYTE [DISTWIDTH] IN BLOOD BY AUTOMATED COUNT: 20 % (ref 11.5–14.5)
EST. GFR  (NO RACE VARIABLE): 15.6 ML/MIN/1.73 M^2
GLUCOSE SERPL-MCNC: 134 MG/DL (ref 70–110)
HCT VFR BLD AUTO: 24.1 % (ref 37–48.5)
HGB BLD-MCNC: 7.5 G/DL (ref 12–16)
MAGNESIUM SERPL-MCNC: 2 MG/DL (ref 1.6–2.6)
MCH RBC QN AUTO: 31 PG (ref 27–31)
MCHC RBC AUTO-ENTMCNC: 31.1 G/DL (ref 32–36)
MCV RBC AUTO: 100 FL (ref 82–98)
PHOSPHATE SERPL-MCNC: 2.9 MG/DL (ref 2.7–4.5)
PLATELET # BLD AUTO: 213 K/UL (ref 150–450)
PMV BLD AUTO: 11.1 FL (ref 9.2–12.9)
POCT GLUCOSE: 118 MG/DL (ref 70–110)
POCT GLUCOSE: 135 MG/DL (ref 70–110)
POCT GLUCOSE: 168 MG/DL (ref 70–110)
POCT GLUCOSE: 184 MG/DL (ref 70–110)
POTASSIUM SERPL-SCNC: 3.9 MMOL/L (ref 3.5–5.1)
RBC # BLD AUTO: 2.42 M/UL (ref 4–5.4)
SODIUM SERPL-SCNC: 130 MMOL/L (ref 136–145)
WBC # BLD AUTO: 7.59 K/UL (ref 3.9–12.7)

## 2024-12-04 PROCEDURE — 85027 COMPLETE CBC AUTOMATED: CPT | Mod: HCNC | Performed by: FAMILY MEDICINE

## 2024-12-04 PROCEDURE — 25000003 PHARM REV CODE 250: Mod: HCNC | Performed by: ANESTHESIOLOGY

## 2024-12-04 PROCEDURE — 63600175 PHARM REV CODE 636 W HCPCS: Mod: HCNC

## 2024-12-04 PROCEDURE — 21400001 HC TELEMETRY ROOM: Mod: HCNC

## 2024-12-04 PROCEDURE — 36415 COLL VENOUS BLD VENIPUNCTURE: CPT | Mod: HCNC | Performed by: FAMILY MEDICINE

## 2024-12-04 PROCEDURE — 83735 ASSAY OF MAGNESIUM: CPT | Mod: HCNC | Performed by: FAMILY MEDICINE

## 2024-12-04 PROCEDURE — 63600175 PHARM REV CODE 636 W HCPCS: Mod: JG,HCNC | Performed by: NURSE PRACTITIONER

## 2024-12-04 PROCEDURE — 25000003 PHARM REV CODE 250: Mod: HCNC

## 2024-12-04 PROCEDURE — 90935 HEMODIALYSIS ONE EVALUATION: CPT | Mod: HCNC

## 2024-12-04 PROCEDURE — 99232 SBSQ HOSP IP/OBS MODERATE 35: CPT | Mod: HCNC,,, | Performed by: NURSE PRACTITIONER

## 2024-12-04 PROCEDURE — 25000003 PHARM REV CODE 250: Mod: HCNC | Performed by: FAMILY MEDICINE

## 2024-12-04 PROCEDURE — 25000003 PHARM REV CODE 250: Mod: HCNC | Performed by: NURSE PRACTITIONER

## 2024-12-04 PROCEDURE — A4216 STERILE WATER/SALINE, 10 ML: HCPCS | Mod: HCNC | Performed by: ANESTHESIOLOGY

## 2024-12-04 PROCEDURE — 63600175 PHARM REV CODE 636 W HCPCS: Mod: HCNC | Performed by: FAMILY MEDICINE

## 2024-12-04 PROCEDURE — 80069 RENAL FUNCTION PANEL: CPT | Mod: HCNC | Performed by: FAMILY MEDICINE

## 2024-12-04 RX ORDER — SODIUM CHLORIDE 9 MG/ML
INJECTION, SOLUTION INTRAVENOUS ONCE
Status: COMPLETED | OUTPATIENT
Start: 2024-12-04 | End: 2024-12-04

## 2024-12-04 RX ORDER — OXYCODONE AND ACETAMINOPHEN 10; 325 MG/1; MG/1
1 TABLET ORAL EVERY 6 HOURS PRN
Qty: 20 TABLET | Refills: 0 | Status: SHIPPED | OUTPATIENT
Start: 2024-12-04 | End: 2024-12-05 | Stop reason: SDUPTHER

## 2024-12-04 RX ORDER — SODIUM CHLORIDE 9 MG/ML
INJECTION, SOLUTION INTRAVENOUS ONCE
Status: CANCELLED | OUTPATIENT
Start: 2024-12-05

## 2024-12-04 RX ORDER — ISOSORBIDE DINITRATE 10 MG/1
5 TABLET ORAL 2 TIMES DAILY
Start: 2024-12-04 | End: 2025-01-03

## 2024-12-04 RX ORDER — CARVEDILOL 3.12 MG/1
3.12 TABLET ORAL 2 TIMES DAILY
Qty: 60 TABLET | Refills: 0 | Status: SHIPPED | OUTPATIENT
Start: 2024-12-04 | End: 2025-01-03

## 2024-12-04 RX ADMIN — CLOPIDOGREL BISULFATE 75 MG: 75 TABLET ORAL at 08:12

## 2024-12-04 RX ADMIN — Medication 3 ML: at 05:12

## 2024-12-04 RX ADMIN — MICONAZOLE NITRATE: 20 OINTMENT TOPICAL at 09:12

## 2024-12-04 RX ADMIN — CARVEDILOL 3.12 MG: 3.12 TABLET, FILM COATED ORAL at 09:12

## 2024-12-04 RX ADMIN — APIXABAN 5 MG: 5 TABLET, FILM COATED ORAL at 08:12

## 2024-12-04 RX ADMIN — MUPIROCIN: 20 OINTMENT TOPICAL at 09:12

## 2024-12-04 RX ADMIN — ALLOPURINOL 50 MG: 300 TABLET ORAL at 08:12

## 2024-12-04 RX ADMIN — HEPARIN SODIUM 1000 UNITS: 1000 INJECTION, SOLUTION INTRAVENOUS; SUBCUTANEOUS at 05:12

## 2024-12-04 RX ADMIN — ATORVASTATIN CALCIUM 80 MG: 40 TABLET, FILM COATED ORAL at 08:12

## 2024-12-04 RX ADMIN — EPOETIN ALFA-EPBX 3560 UNITS: 4000 INJECTION, SOLUTION INTRAVENOUS; SUBCUTANEOUS at 04:12

## 2024-12-04 RX ADMIN — Medication: at 09:12

## 2024-12-04 RX ADMIN — APIXABAN 5 MG: 5 TABLET, FILM COATED ORAL at 09:12

## 2024-12-04 RX ADMIN — HYDROMORPHONE HYDROCHLORIDE 0.5 MG: 1 INJECTION, SOLUTION INTRAMUSCULAR; INTRAVENOUS; SUBCUTANEOUS at 01:12

## 2024-12-04 RX ADMIN — SERTRALINE HYDROCHLORIDE 100 MG: 50 TABLET ORAL at 08:12

## 2024-12-04 RX ADMIN — ISOSORBIDE DINITRATE 5 MG: 5 TABLET ORAL at 08:12

## 2024-12-04 RX ADMIN — Medication 3 ML: at 12:12

## 2024-12-04 RX ADMIN — CALCITRIOL 0.5 MCG: 0.5 CAPSULE, LIQUID FILLED ORAL at 08:12

## 2024-12-04 RX ADMIN — SODIUM CHLORIDE: 9 INJECTION, SOLUTION INTRAVENOUS at 02:12

## 2024-12-04 RX ADMIN — ISOSORBIDE DINITRATE 5 MG: 5 TABLET ORAL at 09:12

## 2024-12-04 RX ADMIN — CARVEDILOL 3.12 MG: 3.12 TABLET, FILM COATED ORAL at 08:12

## 2024-12-04 RX ADMIN — Medication 6 MG: at 09:12

## 2024-12-04 NOTE — PROGRESS NOTES
[facility] Medicine  Progress Note    Patient Name: Suyapa Connelly  MRN: [id number]  Patient Class: IP- Inpatient   Admission Date: 11/15/2024  Length of Stay: 17 days  Attending Physician: Dimitris Rojo III*  Primary Care Provider: Vanessa Noel MD        Subjective     Principal Problem:Cellulitis of left thigh        HPI:  Per admitting physician (11/15):  58yo female whose PMH includes bilateral BKA, ESRD, DM, HTN, HLD presents with purulence from L stump wound.  She was discharged from this facility 11.12.24 with oral abx or LLE cellulitis after bring admitted for septic shock.  During that admission general surgery was consulted for further evaluation d/t concerns for nec fasc as there was subcutaneous emphysema noted on CT; after their clinical assessment nec fasc was ruled out.  She did receive IV abx during that admission and on discharge was transitioned to oral augmentin and doxy and she went to SNF.  She was sent back to the ED for continued purulence and LLE pain.  Surgery was consulted in the ED and have no plans for intervention at this time.  MR is pending.  White count is normal.  She has been admitted to medicine for further management.    Overview/Hospital Course:  Admitted for failed outpatient treatment of LLE cellulitis.  Previously admitted to this facility 10/28-11/12 for septic shock d/t LLE cellulitis and was discharged to SNF on Augmentin and doxycycline.  Continued purulence and LLE pain at facility and pt readmitted for ongoing infection.  ID consulted, treated with vanc and cefepime.  MRI revealed multiple fluid collections of the LLE and no evidence of osteomyelitis.  Vascular imaging revealed PAD, specifically left common iliac occlusion.  Vascular surgery consulted, s/p Aortogram and BLE angiogram with left common iliac stent placement and left AKA stump debridement on 11/22.  Postoperative anemia requiring 2 units PRBCs 11/23 and 11/24.  No blood in  stool or urine.  Contacted vascular surgery morning of 11/24 to evaluate left upper leg, no concern for ongoing bleeding.  S/p wound VAC change 11/27 with vascular surgery.  S/p course of vanc and cefepime.  CM/SW assisting with home wound VAC, can discharge home with home health once obtained.  Pt and  decline discharge to facility.    Interval History:  Pt seen and examined as she was returning from dialysis.  LLE pain better today.  Denies any chest pain or SOB.    Labs personally reviewed:  Na 126, K 4.3, BUN 23, Cr 4.3    Discussed with Nephrology:  Dialysis today    Objective:     Vital Signs (Most Recent):  Temp: 98.3 °F (36.8 °C) (12/03/24 1618)  Pulse: 81 (12/03/24 1618)  Resp: 18 (12/03/24 1618)  BP: 120/66 (12/03/24 1618)  SpO2: 98 % (12/03/24 1618) Vital Signs (24h Range):  Temp:  [97.1 °F (36.2 °C)-98.3 °F (36.8 °C)] 98.3 °F (36.8 °C)  Pulse:  [73-95] 81  Resp:  [16-18] 18  SpO2:  [94 %-98 %] 98 %  BP: ()/(54-79) 120/66     Weight: 71 kg (156 lb 8.4 oz)  Body mass index is 47.76 kg/m².    Intake/Output Summary (Last 24 hours) at 12/3/2024 2023  Last data filed at 12/3/2024 1147  Gross per 24 hour   Intake --   Output 2650 ml   Net -2650 ml           Physical Exam  HENT:      Mouth/Throat:      Mouth: Mucous membranes are moist.   Eyes:      Conjunctiva/sclera: Conjunctivae normal.   Cardiovascular:      Rate and Rhythm: Normal rate and regular rhythm.   Pulmonary:      Effort: No respiratory distress.   Abdominal:      General: There is no distension.   Musculoskeletal:      Cervical back: Normal range of motion.      Left lower leg: Edema present.      Comments: Wound vac is in place on stump draining serosanguineous fluid.   Skin:     General: Skin is warm and dry.   Neurological:      General: No focal deficit present.      Mental Status: She is alert.   Psychiatric:         Mood and Affect: Mood normal.         Behavior: Behavior normal.             Assessment and Plan     * Cellulitis  of left thigh  ID consulted:  S/p vanc/cefepime  Blood cultures NGTD  Pain control  Wound VAC change 11/27.  Wound care team to manage VAC moving forward.  Stable for discharge once home wound VAC obtained.    Peripheral vascular disease  S/P Aortogram, bilateral lower extremity angiogram w/ L common iliac stent   Cont statin  D/w vasc, resumed Eliquis with Plavix on 11/25  See plan for cellulitis    Anemia due to chronic kidney disease, on chronic dialysis  Postoperative anemia  No evidence of active bleeding  transfuse for Hgb<7, symptomatic or hemodynamic instability  2 units PRBCs transfused total:  11/23 and 11/24  Asymptomatic  Hb stable    Atherosclerosis of native coronary artery of native heart with angina pectoris  Cont Eliquis and Plavix    Severe obesity (BMI >= 40)  Body mass index is 47.76 kg/m².  Would benefit from dietary and lifestyle modifications in relation to health  Consider dietary/nutrition consult outpatient    ESRD (end stage renal disease)  Nephrology consulted HD management. HD TThS    Chronic combined systolic and diastolic congestive heart failure  TTE 05/2024 EF 15% with diastolic dysfunction.  RV systolic function mildly reduced.  RV wall motion global hypokinesis.  Severe TR.    TTE 11/29 EF 35-40%, hypokinesis of anterior and inferior septum, RV with severely reduced systolic function, moderate to severe TR.  Cont Isordil and Coreg.  Home hydralazine held, resume when BP will tolerate.  Monitor on telemetry, low-Na diet, fluid restriction.  Volume managed with HD  Will need outpatient cardiology follow up    Hyponatremia  In the setting of ESRD.  To be managed with HD    Paroxysmal atrial fibrillation  Eliquis resumed 11/25 per discussion with vascular surgery    CAD (coronary artery disease)  Resumed eliquis/plavix 11/25  Cont statin    Type 2 diabetes mellitus with hyperglycemia, with long-term current use of insulin  SSI  Diabetic diet when not NPO  ACHS glucose checks    CAROLIN  (generalized anxiety disorder)  Cont zoloft      VTE Risk Mitigation (From admission, onward)           Ordered     apixaban tablet 5 mg  2 times daily         11/25/24 0820     IP VTE HIGH RISK PATIENT  Once         11/20/24 1622     heparin (porcine) injection 1,000 Units  As needed (PRN)         11/16/24 1159     Place sequential compression device  Until discontinued         11/15/24 1648     Reason for No Pharmacological VTE Prophylaxis  Once        Question:  Reasons:  Answer:  Already adequately anticoagulated on oral Anticoagulants    11/15/24 1648                    Discharge Planning   DEVAN: 12/4/2024     Code Status: Full Code   Medical Readiness for Discharge Date:   Discharge Plan A: Home, Home Health   Discharge Delays: None known at this time      Dimitris Rojo III, MD  Department of Hospital Medicine   [facility] - Med Surg

## 2024-12-04 NOTE — SUBJECTIVE & OBJECTIVE
Interval History: NAEON. HD yesterday tolerated well. NA improved to 130 today.     Review of patient's allergies indicates:   Allergen Reactions    Ciprofloxacin Itching    Pcn [penicillins]      Rash; tolerated ceftriaxone on 1/13/20  Tolerating Augmentin November 2024     Current Facility-Administered Medications   Medication Frequency    acetaminophen tablet 1,000 mg Q8H PRN    allopurinol split tablet 50 mg Every other day    apixaban tablet 5 mg BID    atorvastatin tablet 80 mg Daily    balsam peru-castor oiL Oint BID    calcitRIOL capsule 0.5 mcg Daily    carvediloL tablet 3.125 mg BID    cinacalcet tablet 30 mg Every Mon, Wed, Fri    clopidogreL tablet 75 mg Daily    dextrose 10% bolus 125 mL 125 mL PRN    dextrose 10% bolus 250 mL 250 mL PRN    epoetin nina-epbx injection 3,560 Units Every Mon, Wed, Fri    glucagon (human recombinant) injection 1 mg PRN    glucose chewable tablet 16 g PRN    glucose chewable tablet 24 g PRN    heparin (porcine) injection 1,000 Units PRN    HYDROmorphone injection 0.5 mg Q4H PRN    insulin aspart U-100 pen 0-5 Units QID (AC + HS) PRN    isosorbide dinitrate tablet 5 mg BID    melatonin tablet 6 mg Nightly    miconazole nitrate 2% ointment BID    mupirocin 2 % ointment BID    naloxone 0.4 mg/mL injection 0.02 mg PRN    oxyCODONE-acetaminophen  mg per tablet 1 tablet Q4H PRN    pregabalin capsule 75 mg Daily    sertraline tablet 100 mg Daily    sodium chloride 0.9% flush 10 mL Q12H PRN    sodium chloride 0.9% flush 3 mL Q6H       Objective:     Vital Signs (Most Recent):  Temp: 97.7 °F (36.5 °C) (12/04/24 0757)  Pulse: 82 (12/04/24 0757)  Resp: 18 (12/04/24 0757)  BP: 123/69 (12/04/24 0757)  SpO2: 100 % (12/04/24 0757) Vital Signs (24h Range):  Temp:  [97.7 °F (36.5 °C)-98.6 °F (37 °C)] 97.7 °F (36.5 °C)  Pulse:  [73-95] 82  Resp:  [18] 18  SpO2:  [96 %-100 %] 100 %  BP: ()/(58-79) 123/69     Weight: 71 kg (156 lb 8.4 oz) (11/29/24 1504)  Body mass index is 47.76  kg/m².  Body surface area is 1.55 meters squared.    I/O last 3 completed shifts:  In: -   Out: 2650 [Other:2650]     Physical Exam  Vitals and nursing note reviewed.   Eyes:      Conjunctiva/sclera: Conjunctivae normal.   Cardiovascular:      Pulses: Normal pulses.   Pulmonary:      Effort: Pulmonary effort is normal.   Musculoskeletal:         General: Deformity present.   Neurological:      Mental Status: She is alert and oriented to person, place, and time.   Psychiatric:         Mood and Affect: Mood normal.        Significant Labs:  CBC:   Recent Labs   Lab 12/04/24  0305   WBC 7.59   RBC 2.42*   HGB 7.5*   HCT 24.1*      *   MCH 31.0   MCHC 31.1*     CMP:   Recent Labs   Lab 12/04/24  0305   *   CALCIUM 7.9*   ALBUMIN 2.1*   *   K 3.9   CO2 25   CL 96   BUN 17   CREATININE 3.3*     All labs within the past 24 hours have been reviewed.

## 2024-12-04 NOTE — PROGRESS NOTES
[facility]  Nephrology  Progress Note    Patient Name: Suyapa Connelly  MRN: [id number]  Admission Date: 11/15/2024  Hospital Length of Stay: 18 days  Attending Provider: Dimitris Rojo III*   Primary Care Physician: Vanessa Noel MD  Principal Problem:Cellulitis of left thigh    Subjective:     Interval History: NAEON. HD yesterday tolerated well. NA improved to 130 today.     Review of patient's allergies indicates:   Allergen Reactions    Ciprofloxacin Itching    Pcn [penicillins]      Rash; tolerated ceftriaxone on 1/13/20  Tolerating Augmentin November 2024     Current Facility-Administered Medications   Medication Frequency    acetaminophen tablet 1,000 mg Q8H PRN    allopurinol split tablet 50 mg Every other day    apixaban tablet 5 mg BID    atorvastatin tablet 80 mg Daily    balsam peru-castor oiL Oint BID    calcitRIOL capsule 0.5 mcg Daily    carvediloL tablet 3.125 mg BID    cinacalcet tablet 30 mg Every Mon, Wed, Fri    clopidogreL tablet 75 mg Daily    dextrose 10% bolus 125 mL 125 mL PRN    dextrose 10% bolus 250 mL 250 mL PRN    epoetin nina-epbx injection 3,560 Units Every Mon, Wed, Fri    glucagon (human recombinant) injection 1 mg PRN    glucose chewable tablet 16 g PRN    glucose chewable tablet 24 g PRN    heparin (porcine) injection 1,000 Units PRN    HYDROmorphone injection 0.5 mg Q4H PRN    insulin aspart U-100 pen 0-5 Units QID (AC + HS) PRN    isosorbide dinitrate tablet 5 mg BID    melatonin tablet 6 mg Nightly    miconazole nitrate 2% ointment BID    mupirocin 2 % ointment BID    naloxone 0.4 mg/mL injection 0.02 mg PRN    oxyCODONE-acetaminophen  mg per tablet 1 tablet Q4H PRN    pregabalin capsule 75 mg Daily    sertraline tablet 100 mg Daily    sodium chloride 0.9% flush 10 mL Q12H PRN    sodium chloride 0.9% flush 3 mL Q6H       Objective:     Vital Signs (Most Recent):  Temp: 97.7 °F (36.5 °C) (12/04/24 0757)  Pulse: 82 (12/04/24 0757)  Resp: 18 (12/04/24  0757)  BP: 123/69 (12/04/24 0757)  SpO2: 100 % (12/04/24 0757) Vital Signs (24h Range):  Temp:  [97.7 °F (36.5 °C)-98.6 °F (37 °C)] 97.7 °F (36.5 °C)  Pulse:  [73-95] 82  Resp:  [18] 18  SpO2:  [96 %-100 %] 100 %  BP: ()/(58-79) 123/69     Weight: 71 kg (156 lb 8.4 oz) (11/29/24 1504)  Body mass index is 47.76 kg/m².  Body surface area is 1.55 meters squared.    I/O last 3 completed shifts:  In: -   Out: 2650 [Other:2650]     Physical Exam  Vitals and nursing note reviewed.   Eyes:      Conjunctiva/sclera: Conjunctivae normal.   Cardiovascular:      Pulses: Normal pulses.   Pulmonary:      Effort: Pulmonary effort is normal.   Musculoskeletal:         General: Deformity present.   Neurological:      Mental Status: She is alert and oriented to person, place, and time.   Psychiatric:         Mood and Affect: Mood normal.        Significant Labs:  CBC:   Recent Labs   Lab 12/04/24  0305   WBC 7.59   RBC 2.42*   HGB 7.5*   HCT 24.1*      *   MCH 31.0   MCHC 31.1*     CMP:   Recent Labs   Lab 12/04/24  0305   *   CALCIUM 7.9*   ALBUMIN 2.1*   *   K 3.9   CO2 25   CL 96   BUN 17   CREATININE 3.3*     All labs within the past 24 hours have been reviewed.       Assessment/Plan:     Renal/  ESRD (end stage renal disease)  59 year old female hx of ESRD on HD TTS admitted for cellulitis left thigh. Last HD 11/14/24. Nephrology consulted for HD management.    Nephrology History  -Outpatient HD [facility]: [facility] ?  -Nephrologist: MD Kerry  -HD tx days: TTS  -HD tx time: ?  -Last HD tx: 11/14/24  -HD access: L TDC  -HD modality: iHD  -Residual urine: anuric  -EDW:  67-67.5 kg ?    Plan/Recommendations  ESRD on HD:  -Plan for discharge today. Per CM pt is on MWF iHD schedule as outpatient. Plan for HD again today prior to discharge, then resume outpatient on Friday   -hyponatremia in setting of ESRD - recommend 800 ml fluid restriction   -Strict I&Os  Anemia in ESRD  -Hgb goal 10-11,  continue Epo  -Iron c/I in the setting of infection  Mineral Bone Disease in ESRD  -Continue OP sensipar and calcitriol  -sevelamer 800 mg PO TIDWM  -Renal diet if not NPO      ID  * Cellulitis of left thigh  -management per jax        Thank you for your consult. I will follow-up with patient. Please contact us if you have any additional questions.    Katie Monique DNP  Nephrology  [facility] - Med Surg

## 2024-12-04 NOTE — ASSESSMENT & PLAN NOTE
59 year old female hx of ESRD on HD TTS admitted for cellulitis left thigh. Last HD 11/14/24. Nephrology consulted for HD management.    Nephrology History  -Outpatient HD [facility]: [facility] ?  -Nephrologist: MD Kerry  -HD tx days: TTS  -HD tx time: ?  -Last HD tx: 11/14/24  -HD access: L TDC  -HD modality: iHD  -Residual urine: anuric  -EDW:  67-67.5 kg ?    Plan/Recommendations  ESRD on HD:  -Continue TTS iHD while IP   -hyponatremia in setting of ESRD - recommend 800 ml fluid restriction   -Strict I&Os  Anemia in ESRD  -Hgb goal 10-11, continue Epo  -Iron c/I in the setting of infection  Mineral Bone Disease in ESRD  -Continue OP sensipar and calcitriol  -sevelamer 800 mg PO TIDWM  -Renal diet if not NPO

## 2024-12-04 NOTE — DISCHARGE INSTRUCTIONS
Seek medical attention for worsening LLE pain, or developing other symptoms such as abnormal bleeding chest pain, shortness of breath, palpitations, abdominal pain, nausea/vomiting, fevers/chills.     Avoid tobacco products, alcohol, illicit substances.    Lyrica and Percocet are sedating medications so do not operate heavy machinery when using and try to avoid combining with other sedating meds.    Eliquis and Plavix are medications that increased the risk of bleeding. Seek medical attention if you notice any abnormal bleeding.    Check glucose 4 times per day, before each meal and at bedtime.  Utilize sliding scale as detailed in the medication list.  Write these numbers down in a journal.  Bring this journal to follow up appointments for further medication adjustments.    Blood pressure on the low side throughout admission.  Decreased isosorbide dinitrate and Coreg dosing.  Hold hydralazine.  Check your blood pressure twice daily. Write down blood pressure in a journal. Bring this journal to primary care physician for further medication adjustments.    Hold sodium bicarbonate tabs until follow up with Nephrology.    Referral placed for vascular surgery for postop follow up.    Referral placed for Cardiology for CHF follow up.    Follow-up with PCP within 1-2 weeks. It is important to note that each of these medications that I have prescribed typically will not have refills. This is so your primary care physician or other specialists in the outpatient setting can review your progress and determine if these medications are to be continued. If they determine the medications need to be continued, make sure that you remind them about refills at your follow-up appointments.    Maintain dialysis appointments and follow up with Nephrology next scheduled appt.

## 2024-12-04 NOTE — NURSING
Patient arrived in bed . Both lumens of HD  catheter flushes  good . Dialysis treatment started .   Report received  from Primary Nurse .

## 2024-12-04 NOTE — PLAN OF CARE
EDUARD scheduled d/c transportation to patient' home through [facility]. Patient is scheduled to be picked up at 8:00 pm.         EDUARD Garces  Case Management  [phone]

## 2024-12-04 NOTE — PROGRESS NOTES
[facility] - [facility] Surg  Wound Care    Patient Name:  Suyapa Connelly   MRN:  [id number]  Date: 12/4/2024  Diagnosis: Cellulitis of left thigh    History:     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    Osteomyelitis     Osteomyelitis of left foot 04/30/2021    Ulcer of left foot     Vaginal delivery     x1       Social History     Socioeconomic History    Marital status:    Occupational History    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      Social Drivers of Health     Financial Resource Strain: Low Risk  (11/16/2024)    Overall Financial Resource Strain (CARDIA)     Difficulty of Paying Living Expenses: Not hard at all   Recent Concern: Financial Resource Strain - Medium Risk (10/29/2024)    Overall Financial Resource Strain (CARDIA)     Difficulty of Paying Living Expenses: Somewhat hard   Food Insecurity: No Food Insecurity (11/16/2024)    Hunger Vital Sign     Worried About Running Out of Food in the Last Year: Never true     Ran Out of Food in the Last Year: Never true   Transportation Needs: No Transportation Needs (11/16/2024)    TRANSPORTATION NEEDS     Transportation : No   Physical Activity: Inactive (11/14/2024)    Exercise Vital Sign     Days of Exercise per Week: 0 days     Minutes of Exercise per Session: 0 min   Stress: No Stress Concern Present (11/16/2024)    Citizen of [place] [employer] - Occupational Stress Questionnaire     Feeling of Stress : Not  at all   Recent Concern: Stress - Stress Concern Present (10/29/2024)    [place] [employer] - Occupational Stress Questionnaire     Feeling of Stress : To some extent   Housing Stability: Low Risk  (11/16/2024)    Housing Stability Vital Sign     Unable to Pay for Housing in the Last Year: No     Homeless in the Last Year: No       Precautions:     Allergies as of 11/15/2024 - Reviewed 11/15/2024   Allergen Reaction Noted    Ciprofloxacin Itching 07/18/2020    Iodine  01/10/2020    Pcn [penicillins]  11/14/2014       WOC Assessment Details/Treatment     Patient seen for wound vac dressing application to L thigh wound. Home vac delivered. Smith and Nephew Renasys-Go applied with nonadherent laver to wound bed for comfort- 3 pc of black granufoam bridged toward anterior thigh- again for comfort. Easily achieved ordered setting of 100 mmHg continuous suction with low intensity pressure. Repositioned self to comfort- tolerated care without complaint.- continues to voice good basic understanding of continued  plan of care.      Reviewed chart for this encounter.  See flowsheet for findings.      Recommendations:  L thigh- s/p debridement site: NPWT @ 100 mmHg continuous suction with low intensity pressure. Dressing changes 3x/ week.    -continue moisture barrier to buttocks/ B ischia 2x/ day    -continue PIP interventions.      Discussed POC with patient   See EMR for orders and patient education.    Bedside nursing to continue care and monitoring.  Bedside to maintain pressure injury prevention interventions.    Current documented Carlos score is 13 with a nutrition sub-scale score of 3     12/04/24 1230   WOCN Assessment   WOCN Total Time (mins) 30   Visit Date 12/04/24   Visit Time 1230   Consult Type Follow Up   WOCN Speciality Wound   Intervention chart review;changed;assessed;applied   Teaching on-going  (pt- re application of wound vac dressing to L thigh.)   Skin Interventions   Dehiscence  Prevention/Management wound/incision splinting encouraged   Device Skin Pressure Protection absorbent pad utilized/changed   Pressure Reduction Devices specialty bed utilized;positioning supports utilized  (pillows in place.)   Pressure Reduction Techniques frequent weight shift encouraged   Positioning   Body Position position changed independently   Head of Bed (HOB) Positioning HOB elevated   Positioning/Transfer Devices pillows   Pressure Injury Prevention    Check Moisture Management Pad Done   Sacral Foam Dressing Patient incontinent, barrier cream applied   Check Medical Devices Done        Wound 11/22/24 1634 Incision Left Thigh   Date First Assessed/Time First Assessed: 11/22/24 1634   Primary Wound Type: Incision  Side: Left  Location: (c) Thigh  Closure Method: (c) Sutures;Other (see comments)   Wound Image    Dressing Appearance Moist drainage   Drainage Amount Small   Drainage Characteristics/Odor Serous;Serosanguineous   Appearance Pink;Red;Yellow   Periwound Area Dry;Intact   Wound Edges Open   Care Cleansed with:;Antimicrobial agent  (Vashe solution)   Dressing Change Due 12/06/24        Negative Pressure Wound Therapy  11/22/24 1635 Left   Placement Date/Time: 11/22/24 1635   Side: Left  Location: (c) Thigh   NPWT Type Vacuum Therapy  (Bowens/ Nephew RenaSys-go)   Therapy Setting NPWT Continuous therapy   Pressure Setting NPWT 100 mmHg   Sponges Inserted NPWT 3;Black;Other  (2 nonadherent layer)     Will continue to follow as needed and/ or as directed until discharge.    Sharri Ley BSN, RN, CWOCN  12/04/2024

## 2024-12-04 NOTE — PLAN OF CARE
12/04/24 1147   Post-Acute Status   Post-Acute Authorization Home Health   Home Health Status Set-up Complete/Auth obtained   Discharge Delays None known at this time   Discharge Plan   Discharge Plan A Home;Home with family;Home Health   Discharge Plan B Home;Home with family;Home Health     CM at bedside with pt, confirmed wound vac is at bedside delivered today. Will ask nurse to call wound care to place wound vac in preparation for discharge. CM will also inform -[place] of pending discharge. Pt will continue with HD treatments at [facility] @ [address] [address] MWF @2:15pm, pt is aware. Will cont to follow until discharge,    Luisana Hurst, RN Case Manager  [phone]

## 2024-12-04 NOTE — SUBJECTIVE & OBJECTIVE
Interval History:  Pt seen and examined as she was returning from dialysis.  LLE pain better today.  Denies any chest pain or SOB.    Labs personally reviewed:  Na 126, K 4.3, BUN 23, Cr 4.3    Discussed with Nephrology:  Dialysis today    Objective:     Vital Signs (Most Recent):  Temp: 98.3 °F (36.8 °C) (12/03/24 1618)  Pulse: 81 (12/03/24 1618)  Resp: 18 (12/03/24 1618)  BP: 120/66 (12/03/24 1618)  SpO2: 98 % (12/03/24 1618) Vital Signs (24h Range):  Temp:  [97.1 °F (36.2 °C)-98.3 °F (36.8 °C)] 98.3 °F (36.8 °C)  Pulse:  [73-95] 81  Resp:  [16-18] 18  SpO2:  [94 %-98 %] 98 %  BP: ()/(54-79) 120/66     Weight: 71 kg (156 lb 8.4 oz)  Body mass index is 47.76 kg/m².    Intake/Output Summary (Last 24 hours) at 12/3/2024 2023  Last data filed at 12/3/2024 1147  Gross per 24 hour   Intake --   Output 2650 ml   Net -2650 ml           Physical Exam  HENT:      Mouth/Throat:      Mouth: Mucous membranes are moist.   Eyes:      Conjunctiva/sclera: Conjunctivae normal.   Cardiovascular:      Rate and Rhythm: Normal rate and regular rhythm.   Pulmonary:      Effort: No respiratory distress.   Abdominal:      General: There is no distension.   Musculoskeletal:      Cervical back: Normal range of motion.      Left lower leg: Edema present.      Comments: Wound vac is in place on stump draining serosanguineous fluid.   Skin:     General: Skin is warm and dry.   Neurological:      General: No focal deficit present.      Mental Status: She is alert.   Psychiatric:         Mood and Affect: Mood normal.         Behavior: Behavior normal.

## 2024-12-05 ENCOUNTER — TELEPHONE (OUTPATIENT)
Dept: FAMILY MEDICINE | Facility: CLINIC | Age: 59
End: 2024-12-05
Payer: MEDICARE

## 2024-12-05 DIAGNOSIS — L03.116 CELLULITIS OF LEFT THIGH: ICD-10-CM

## 2024-12-05 NOTE — TELEPHONE ENCOUNTER
Spoke to pt  and informed him that we would like the pt to come into the office and that she should not take the laxis. Pt  stated understanding.

## 2024-12-05 NOTE — PLAN OF CARE
Problem: Adult Inpatient Plan of Care  Goal: Plan of Care Review  Outcome: Adequate for Care Transition  Goal: Patient-Specific Goal (Individualized)  Outcome: Adequate for Care Transition  Goal: Absence of Hospital-Acquired Illness or Injury  Outcome: Adequate for Care Transition  Goal: Optimal Comfort and Wellbeing  Outcome: Adequate for Care Transition  Goal: Readiness for Transition of Care  Outcome: Adequate for Care Transition     Problem: Bariatric Environmental Safety  Goal: Safety Maintained with Care  Outcome: Adequate for Care Transition     Problem: Infection  Goal: Absence of Infection Signs and Symptoms  Outcome: Adequate for Care Transition     Problem: Wound  Goal: Optimal Coping  Outcome: Adequate for Care Transition  Goal: Optimal Functional Ability  Outcome: Adequate for Care Transition  Goal: Absence of Infection Signs and Symptoms  Outcome: Adequate for Care Transition  Goal: Improved Oral Intake  Outcome: Adequate for Care Transition  Goal: Optimal Pain Control and Function  Outcome: Adequate for Care Transition  Goal: Skin Health and Integrity  Outcome: Adequate for Care Transition  Goal: Optimal Wound Healing  Outcome: Adequate for Care Transition     Problem: Sepsis/Septic Shock  Goal: Optimal Coping  Outcome: Adequate for Care Transition  Goal: Absence of Bleeding  Outcome: Adequate for Care Transition  Goal: Blood Glucose Level Within Targeted Range  Outcome: Adequate for Care Transition  Goal: Absence of Infection Signs and Symptoms  Outcome: Adequate for Care Transition  Goal: Optimal Nutrition Intake  Outcome: Adequate for Care Transition     Problem: Skin Injury Risk Increased  Goal: Skin Health and Integrity  Outcome: Adequate for Care Transition     Problem: Diabetes Comorbidity  Goal: Blood Glucose Level Within Targeted Range  Outcome: Adequate for Care Transition     Problem: Fall Injury Risk  Goal: Absence of Fall and Fall-Related Injury  Outcome: Adequate for Care Transition      Problem: Hemodialysis  Goal: Safe, Effective Therapy Delivery  Outcome: Adequate for Care Transition  Goal: Effective Tissue Perfusion  Outcome: Adequate for Care Transition  Goal: Absence of Infection Signs and Symptoms  Outcome: Adequate for Care Transition     Problem: Chronic Kidney Disease  Goal: Electrolyte Balance  Outcome: Adequate for Care Transition  Goal: Fluid Balance  Outcome: Adequate for Care Transition

## 2024-12-05 NOTE — PLAN OF CARE
Pt was discharged home via [facility] arranged by CM. Pt will receive HH services from [facility] . Pt will continue with HD treatments at [facility] @ [address] [address] MWF @2:15pm.  is aware and updated by CM. No further needs identified .    [facility] - Med Surg  Discharge Final Note    Primary Care Provider: Vanessa Noel MD    Expected Discharge Date: 12/4/2024    Final Discharge Note (most recent)       Final Note - 12/05/24 0822          Final Note    Assessment Type Final Discharge Note (P)      Anticipated Discharge Disposition Home-Health Care Svc (P)         Post-Acute Status    Post-Acute Authorization Home Health (P)      Home Health Status Set-up Complete/Auth obtained (P)      Discharge Delays None known at this time (P)                      Important Message from Medicare  Important Message from Medicare regarding Discharge Appeal Rights: Explained to patient/caregiver, Signed/date by patient/caregiver, Given to patient/caregiver     Date IMM was signed: 12/02/24  Time IMM was signed: 1134

## 2024-12-05 NOTE — TELEPHONE ENCOUNTER
----- Message from Padmini sent at 12/5/2024  4:35 PM CST -----  Contact: Josephine  Type:  RX  Request    Who Called: Josephine edwards/ Home Health   Refill or New Rx:new   RX Name and Strength:oxyCODONE-acetaminophen (PERCOCET)  mg per tablet  Preferred Pharmacy with phone number: [facility] [id number] [facility], [place] [address] phone# [phone]  Local or Mail Order:local  Ordering Provider:ED  Would the patient rather a call back or a response via [facility]? call  Best Call Back Number:[phone]  Additional Information:   Nurse stated pt was discharged and ED sent prescription to wrong pharm.  Nurse requesting it be sent to pharm listed above

## 2024-12-05 NOTE — TELEPHONE ENCOUNTER
Spoke with home health nurse Josephine and attending is out for the next 7 days so she would like to know if Dr Noel can send in prescription to correct Pharmacy which is [facility].

## 2024-12-05 NOTE — DISCHARGE SUMMARY
[facility] Medicine  Discharge Summary      Patient Name: Suyapa Connelly  MRN: [id number]  CHAVEZ: [id number]  Patient Class: IP- Inpatient  Admission Date: 11/15/2024  Hospital Length of Stay: 19 days  Discharge Date and Time: 12/4/2024 at 6:30 p.m.  Attending Physician: Dimitris Rojo III, MD  Discharging Provider: Dimitris Rojo III, MD  Primary Care Provider: Vanessa Noel MD  [facility] Medicine Team: [facility] Dimitris Rojo III, MD  Primary Care Team: [facility]    HPI:   Per admitting physician (11/15):  58yo female whose PMH includes bilateral BKA, ESRD, DM, HTN, HLD presents with purulence from L stump wound.  She was discharged from this facility 11.12.24 with oral abx or LLE cellulitis after bring admitted for septic shock.  During that admission general surgery was consulted for further evaluation d/t concerns for nec fasc as there was subcutaneous emphysema noted on CT; after their clinical assessment nec fasc was ruled out.  She did receive IV abx during that admission and on discharge was transitioned to oral augmentin and doxy and she went to SNF.  She was sent back to the ED for continued purulence and LLE pain.  Surgery was consulted in the ED and have no plans for intervention at this time.  MR is pending.  White count is normal.  She has been admitted to medicine for further management.    Procedure(s) (LRB):  ANGIOGRAM, EXTREMITY, UNILATERAL (Left)  IRRIGATION AND DEBRIDEMENT, LOWER EXTREMITY (Left)  STENT, ILIAC ARTERY (Left)      Hospital Course:   Admitted for failed outpatient treatment of LLE cellulitis.  Previously admitted to this facility 10/28-11/12 for septic shock d/t LLE cellulitis and was discharged to SNF on Augmentin and doxycycline.  Continued purulence and LLE pain at facility and pt readmitted for ongoing infection.  ID consulted, treated with vanc and cefepime.  MRI revealed multiple fluid collections of the LLE and no evidence of  osteomyelitis.  Vascular imaging revealed PAD, specifically left common iliac occlusion.  Vascular surgery consulted, s/p Aortogram and BLE angiogram with left common iliac stent placement and left AKA stump debridement on 11/22.  Postoperative anemia requiring 2 units PRBCs 11/23 and 11/24.  No blood in stool or urine.  Contacted vascular surgery morning of 11/24 to evaluate left upper leg, no concern for ongoing bleeding.  S/p wound VAC change 11/27 with vascular surgery and Wound Care took over vac management..  S/p course of vanc and cefepime.      Hyponatremia throughout admission, managed with routine hemodialysis.  Nephrology consulted who recommended fluid restriction of 800 mL per day.  HD on 12/04 to get pt back on track for usual MWF schedule.    On chart review, noted pt had previous echo with severely reduced EF (15% in May 20, 2024) and WMA.  Repeat echo 11/29 EF 35-40%, hypokinesis of anterior and inferior septum, RV with severely reduced systolic function, moderate to severe TR.  Previously on bidil and Coreg.  BP low through admission, Isordil and Coreg dose decreased, hydralazine held.  Discharged on that regimen and recommend close follow up with Cardiology    Pt and  declined discharge to SNF.  CM/SW assisted with obtaining home wound VAC and pt discharged home with home health.    The patient's chronic medical conditions were managed appropriately. Discharge plan of care was discussed at length with patient including patient's need for close outpatient follow-up. Medication counseling also took place with the patient being educated on potential side effects/adverse events. Patient also counseled about avoiding driving, operating machinery, or participating in any activities that may pose harm to self or others if they are taking medications that cause drowsiness or altered mental status. Patient also counseled to take medicines as prescribed and do not drink alcohol, use tobacco products,  or use illicit substances. Patient counseled to return to the hospital or seek medical care if baseline status should suddenly change, return of symptoms occurs, or for any new or concerning symptoms that arise. Patient was in agreement with plan of care going forward and was then discharged.  Recommend close follow-up with PCP, HD Clinic, and Nephrology.  Referral placed for vascular surgery and Cardiology.      Physical Exam  HENT:      Mouth: Mucous membranes are moist.   Eyes:      Conjunctiva/sclera: Conjunctivae normal.   Cardiovascular:      Rate and Rhythm: Normal rate and regular rhythm.   Pulmonary:      Effort: No respiratory distress.   Abdominal:      General: There is no distension.   Musculoskeletal:      Cervical back: Normal range of motion.      Left thigh: Edema present.      Comments: Wound vac is in place on stump draining serosanguineous fluid.   Skin:     General: Skin is warm and dry.   Neurological:      General: No focal deficit present.      Mental Status: She is alert.   Psychiatric:         Mood and Affect: Mood normal.         Behavior: Behavior normal.         Goals of Care Treatment Preferences:  Code Status: Full Code      SDOH Screening:  The patient was screened for utility difficulties, food insecurity, transport difficulties, housing insecurity, and interpersonal safety and there were no concerns identified this admission.     Consults:   Consults (From admission, onward)          Status Ordering Provider     Inpatient consult to Midline team  Once        Provider:  (Not yet assigned)    Completed ANGELA VILLAGOMEZ III     Inpatient consult to Midline team  Once        Provider:  (Not yet assigned)    Completed ANGELA VILLAGOMEZ III     Inpatient consult to Vascular Surgery  Once        Provider:  Yony Lindsey MD    Completed SAMARA SANDERS     Inpatient consult to General Surgery  Once        Provider:  Petra Diaz MD    Completed SAMARA SANDERS      Inpatient consult to Infectious Diseases  Once        Provider:  Yony Diego MD    Completed SAMARA SANDERS     [facility]patient [facility]ICC team ([facility])  Once        Provider:  (Not yet assigned)    SAMARA Carlos     Inpatient consult to Nephrology  Once        Provider:  Joann Anton DO    Completed SAMARA SANDERS            Final Active Diagnoses:    Diagnosis Date Noted POA    PRINCIPAL PROBLEM:  Cellulitis of left thigh [L03.116] 10/28/2024 Yes    Peripheral vascular disease [I73.9] 10/06/2015 Yes    Anemia due to chronic kidney disease, on chronic dialysis [N18.6, D63.1, Z99.2] 01/27/2020 Not Applicable    Postoperative anemia [D64.9] 12/01/2024 No    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, on chronic dialysis [N18.5, E83.9, Z99.2, M89.9] 11/29/2024 Not Applicable    Atherosclerosis of native coronary artery of native heart with angina pectoris [I25.119] 10/21/2024 Yes    Severe obesity (BMI >= 40) [E66.01] 05/14/2024 Yes    ESRD (end stage renal disease) [N18.6] 04/21/2023 Yes    Chronic combined systolic and diastolic congestive heart failure [I50.42] 06/21/2021 Yes    Hyponatremia [E87.1] 07/22/2020 Yes    Paroxysmal atrial fibrillation [I48.0] 01/28/2020 Yes    Type 2 diabetes mellitus with hyperglycemia, with long-term current use of insulin [E11.65, Z79.4] 01/02/2020 Not Applicable    CAD (coronary artery disease) [I25.10] 01/02/2020 Yes    CAROLIN (generalized anxiety disorder) [F41.1] 05/07/2018 Yes      Problems Resolved During this Admission:       Discharged Condition: stable    Disposition: Home-Health Care c    Follow Up:    Patient Instructions:      Ambulatory referral/consult to Cardiology   Standing Status: Future   Referral Priority: Routine Referral Type: Consultation   Referral Reason: Specialty Services Required   Requested Specialty: Cardiology   Number of Visits Requested: 1     Ambulatory referral/consult to  "Vascular Surgery   Standing Status: Future   Referral Priority: Urgent Referral Type: Consultation   Referral Reason: Specialty Services Required   Requested Specialty: Vascular Surgery   Number of Visits Requested: 1     Diet renal     Activity as tolerated       Significant Diagnostic Studies: Labs: BMP:   Recent Labs   Lab 12/04/24  0305   *   *   K 3.9   CL 96   CO2 25   BUN 17   CREATININE 3.3*   CALCIUM 7.9*   MG 2.0    and CBC   Recent Labs   Lab 12/04/24  0305   WBC 7.59   HGB 7.5*   HCT 24.1*          Pending Diagnostic Studies:       None           Medications:  Reconciled Home Medications:      Medication List        START taking these medications      oxyCODONE-acetaminophen  mg per tablet  Commonly known as: PERCOCET  Take 1 tablet by mouth every 6 (six) hours as needed for Pain.  Replaces: oxyCODONE-acetaminophen 5-325 mg per tablet            CHANGE how you take these medications      blood sugar diagnostic Strp  1 each by Misc.(Non-Drug; Combo Route) route 4 (four) times daily before meals and nightly.  What changed:   when to take this  Another medication with the same name was removed. Continue taking this medication, and follow the directions you see here.     blood-glucose meter Misc  Before meals and at bedtime  What changed: additional instructions     carvediloL 3.125 MG tablet  Commonly known as: COREG  Take 1 tablet (3.125 mg total) by mouth 2 (two) times daily.  What changed: medication strength     hydrALAZINE 10 MG tablet  Commonly known as: APRESOLINE  Take 1 tablet (10 mg total) by mouth every 12 (twelve) hours.  Notes to patient: HOLD THIS MEDICATION UNTIL FOLLOW UP WITH PCP OR NEPHROLOGY     isosorbide dinitrate 10 MG tablet  Commonly known as: ISORDIL  Take 0.5 tablets (5 mg total) by mouth 2 (two) times daily.  What changed: how much to take     pen needle, diabetic 33 gauge x 5/32" Ndle  1 each by Misc.(Non-Drug; Combo Route) route 4 (four) times daily " before meals and nightly.  What changed:   when to take this  Another medication with the same name was removed. Continue taking this medication, and follow the directions you see here.     sodium bicarbonate 650 MG tablet  Take 1 tablet (650 mg total) by mouth 3 (three) times daily.  Notes to patient: HOLD THIS MEDICATION UNTIL FOLLOW UP WITH NEPHROLOGY            CONTINUE taking these medications      acetaminophen 325 MG tablet  Commonly known as: TYLENOL  Take 2 tablets (650 mg total) by mouth every 8 (eight) hours as needed for Pain.     allopurinoL 100 MG tablet  Commonly known as: ZYLOPRIM  Take 0.5 tablets (50 mg total) by mouth every other day.     apixaban 5 mg Tab  Commonly known as: ELIQUIS  Take 1 tablet (5 mg total) by mouth 2 (two) times daily.     atorvastatin 80 MG tablet  Commonly known as: LIPITOR  Take 1 tablet (80 mg total) by mouth once daily.     calcitRIOL 0.5 MCG Cap  Commonly known as: ROCALTROL  Take 1 capsule (0.5 mcg total) by mouth once daily.     cinacalcet 30 MG Tab  Commonly known as: SENSIPAR  Take 1 tablet (30 mg total) by mouth every Mon, Wed, Fri.     clopidogreL 75 mg tablet  Commonly known as: PLAVIX  Take 1 tablet (75 mg total) by mouth once daily.     DEXCOM G7 SENSOR Radha  Generic drug: blood-glucose sensor  1 each by Misc.(Non-Drug; Combo Route) route once daily.     epoetin nina 4,000 unit/mL injection  Commonly known as: PROCRIT  Inject 1 mL (4,000 Units total) into the skin every Mon, Wed, Fri.     famotidine 20 MG tablet  Commonly known as: PEPCID  Take 1 tablet (20 mg total) by mouth once daily.     insulin aspart U-100 100 unit/mL (3 mL) Inpn pen  Commonly known as: NovoLOG  Inject 0-10 Units into the skin before meals and at bedtime as needed (Hyperglycemia). **MODERATE CORRECTION DOSE**  Blood Glucose  mg/dL                  Pre-meal                2200  151-200                2 units                    1 unit  201-250                4 units                    2  units  251-300                6 units                    3 units  301-350                8 units                    4 units  >350                     10 units                  5 units  Administer subcutaneously if needed at times designated by monitoring  schedule.     insulin glargine U-100 (Lantus) 100 unit/mL (3 mL) Inpn pen  Inject 5 Units into the skin every evening.     Lactobacillus rhamnosus GG 10 billion cell capsule  Commonly known as: CULTURELLE  Take 1 capsule by mouth once daily.     lancets 33 gauge Misc  TEST 3 TIMES DAILY     pregabalin 75 MG capsule  Commonly known as: LYRICA  Take 1 capsule (75 mg total) by mouth Daily.     sertraline 100 MG tablet  Commonly known as: ZOLOFT  Take 1 tablet (100 mg total) by mouth once daily.     vitamin renal formula (B-complex-vitamin c-folic acid) 1 mg Cap  Commonly known as: NEPHROCAP  Take 1 capsule by mouth once daily.            STOP taking these medications      amoxicillin-clavulanate 500-125mg 500-125 mg Tab  Commonly known as: AUGMENTIN     doxycycline 100 MG tablet  Commonly known as: VIBRA-TABS     oxyCODONE-acetaminophen 5-325 mg per tablet  Commonly known as: PERCOCET  Replaced by: oxyCODONE-acetaminophen  mg per tablet              Indwelling Lines/Drains at time of discharge:   Lines/Drains/Airways       Central Venous Catheter Line  Duration                  Hemodialysis Catheter left subclavian -- days                    Time spent on the discharge of patient: 65 minutes         Dimitris Rojo III, MD  Department of Hospital Medicine  [facility] [address] [facility] - [facility] S[facility]

## 2024-12-06 ENCOUNTER — PATIENT OUTREACH (OUTPATIENT)
Dept: ADMINISTRATIVE | Facility: CLINIC | Age: 59
End: 2024-12-06
Payer: MEDICARE

## 2024-12-06 RX ORDER — OXYCODONE AND ACETAMINOPHEN 10; 325 MG/1; MG/1
1 TABLET ORAL EVERY 6 HOURS PRN
Qty: 20 TABLET | Refills: 0 | Status: SHIPPED | OUTPATIENT
Start: 2024-12-06 | End: 2024-12-11

## 2024-12-06 NOTE — PROGRESS NOTES
2nd attempt to make TCC Call. Left voicemail. Please call [phone] leave your first and last name and date of birth and ask for Shanice. I will return your call as soon as possible.

## 2024-12-06 NOTE — PROGRESS NOTES
C3 nurse attempted to contact Suyapa Connelly's  [facility]ell for a TCC post hospital discharge follow up call. The patient is unable to conduct the call @ this time. The patient requested a callback.    The patient has a scheduled hospitals appointment with Vanessa Noel on 12/09/24 @ [id number].

## 2024-12-09 RX ORDER — SODIUM BICARBONATE 650 MG/1
TABLET ORAL
Qty: 90 TABLET | Refills: 11 | Status: SHIPPED | OUTPATIENT
Start: 2024-12-09

## 2024-12-11 ENCOUNTER — TELEPHONE (OUTPATIENT)
Dept: WOUND CARE | Facility: CLINIC | Age: 59
End: 2024-12-11
Payer: MEDICARE

## 2024-12-11 ENCOUNTER — TELEPHONE (OUTPATIENT)
Dept: FAMILY MEDICINE | Facility: CLINIC | Age: 59
End: 2024-12-11
Payer: MEDICARE

## 2024-12-11 DIAGNOSIS — G54.6 PHANTOM LIMB SYNDROME WITH PAIN: ICD-10-CM

## 2024-12-11 DIAGNOSIS — Z89.9 CHRONIC PAIN AFTER AMPUTATION: Primary | ICD-10-CM

## 2024-12-11 DIAGNOSIS — G89.21 CHRONIC PAIN AFTER AMPUTATION: Primary | ICD-10-CM

## 2024-12-11 NOTE — TELEPHONE ENCOUNTER
----- Message from Itzel sent at 12/11/2024 11:47 AM CST -----  Regarding: Wound care orders  Contact: [phone]  RAFAL SHEPHERD calling regarding Patient Advice (message) for # pt  is calling to speak with someone regarding wound care orders for home health pls advise

## 2024-12-11 NOTE — TELEPHONE ENCOUNTER
Spoke to pt  and informed him that Dr Noel put in the order for a powerwheel chair and that I sent the fax to the DME. Pt stated verbal understanding.

## 2024-12-11 NOTE — TELEPHONE ENCOUNTER
----- Message from Christina sent at 12/11/2024  8:21 AM CST -----  Type:  Needs Medical Advice    Who Called: Patient's   Best Call Back Number: [phone]  Additional Information: Patient's  is calling in regards to [facility]'s referral for a power chair and wanted to confirm this with your office

## 2024-12-12 NOTE — TELEPHONE ENCOUNTER
Returned call and spoke with patient's  yesterday 12/11/24 at 1:30 PM;  advised his wife was discharged from [facility] and wife saw wound care,  stated we saw wife while she was in the  hospital.  I advised the  that I work out-patient wound care clinic and we do not go into the hospital that in-house wound care does.  I also advised him to contact the home health  because they should be able to contact the physician who placed the wound care orders. Please verbalized understanding of instructions and will contact  home health

## 2024-12-18 ENCOUNTER — OFFICE VISIT (OUTPATIENT)
Dept: CARDIOLOGY | Facility: CLINIC | Age: 59
End: 2024-12-18
Payer: MEDICARE

## 2024-12-18 ENCOUNTER — TELEPHONE (OUTPATIENT)
Dept: CARDIOLOGY | Facility: CLINIC | Age: 59
End: 2024-12-18

## 2024-12-18 ENCOUNTER — OFFICE VISIT (OUTPATIENT)
Dept: FAMILY MEDICINE | Facility: CLINIC | Age: 59
End: 2024-12-18
Payer: MEDICARE

## 2024-12-18 ENCOUNTER — TELEPHONE (OUTPATIENT)
Dept: FAMILY MEDICINE | Facility: CLINIC | Age: 59
End: 2024-12-18

## 2024-12-18 VITALS
HEIGHT: 55 IN | DIASTOLIC BLOOD PRESSURE: 83 MMHG | OXYGEN SATURATION: 98 % | SYSTOLIC BLOOD PRESSURE: 165 MMHG | HEART RATE: 72 BPM | BODY MASS INDEX: 47.76 KG/M2

## 2024-12-18 VITALS
HEART RATE: 72 BPM | SYSTOLIC BLOOD PRESSURE: 122 MMHG | WEIGHT: 156.5 LBS | BODY MASS INDEX: 36.22 KG/M2 | DIASTOLIC BLOOD PRESSURE: 70 MMHG | OXYGEN SATURATION: 97 % | HEIGHT: 55 IN

## 2024-12-18 DIAGNOSIS — Z79.4 TYPE 2 DIABETES MELLITUS WITH CHRONIC KIDNEY DISEASE ON CHRONIC DIALYSIS, WITH LONG-TERM CURRENT USE OF INSULIN: ICD-10-CM

## 2024-12-18 DIAGNOSIS — E66.01 MORBID OBESITY WITH BMI OF 50.0-59.9, ADULT: ICD-10-CM

## 2024-12-18 DIAGNOSIS — F41.1 GAD (GENERALIZED ANXIETY DISORDER): ICD-10-CM

## 2024-12-18 DIAGNOSIS — I25.10 CORONARY ARTERY DISEASE INVOLVING NATIVE CORONARY ARTERY OF NATIVE HEART WITHOUT ANGINA PECTORIS: ICD-10-CM

## 2024-12-18 DIAGNOSIS — I25.119 ATHEROSCLEROSIS OF NATIVE CORONARY ARTERY OF NATIVE HEART WITH ANGINA PECTORIS: ICD-10-CM

## 2024-12-18 DIAGNOSIS — Z99.2 TYPE 2 DIABETES MELLITUS WITH CHRONIC KIDNEY DISEASE ON CHRONIC DIALYSIS, WITH LONG-TERM CURRENT USE OF INSULIN: ICD-10-CM

## 2024-12-18 DIAGNOSIS — N18.5 CHRONIC KIDNEY DISEASE-MINERAL BONE DISORDER (CKD-MBD) WITH STAGE 5 CHRONIC KIDNEY DISEASE, ON CHRONIC DIALYSIS: ICD-10-CM

## 2024-12-18 DIAGNOSIS — L89.90 PRESSURE INJURY OF SKIN, UNSPECIFIED INJURY STAGE, UNSPECIFIED LOCATION: ICD-10-CM

## 2024-12-18 DIAGNOSIS — Z89.612 S/P AKA (ABOVE KNEE AMPUTATION) BILATERAL: ICD-10-CM

## 2024-12-18 DIAGNOSIS — Z95.828 S/P FEMORAL-POPLITEAL BYPASS SURGERY: ICD-10-CM

## 2024-12-18 DIAGNOSIS — Z95.1 S/P CABG X 1: ICD-10-CM

## 2024-12-18 DIAGNOSIS — Z89.611 S/P AKA (ABOVE KNEE AMPUTATION) BILATERAL: ICD-10-CM

## 2024-12-18 DIAGNOSIS — Z12.4 SCREENING FOR CERVICAL CANCER: ICD-10-CM

## 2024-12-18 DIAGNOSIS — E83.9 CHRONIC KIDNEY DISEASE-MINERAL BONE DISORDER (CKD-MBD) WITH STAGE 5 CHRONIC KIDNEY DISEASE, ON CHRONIC DIALYSIS: ICD-10-CM

## 2024-12-18 DIAGNOSIS — E11.22 TYPE 2 DIABETES MELLITUS WITH CHRONIC KIDNEY DISEASE ON CHRONIC DIALYSIS, WITH LONG-TERM CURRENT USE OF INSULIN: ICD-10-CM

## 2024-12-18 DIAGNOSIS — Z79.4 TYPE 2 DIABETES MELLITUS WITH HYPERGLYCEMIA, WITH LONG-TERM CURRENT USE OF INSULIN: ICD-10-CM

## 2024-12-18 DIAGNOSIS — M89.9 CHRONIC KIDNEY DISEASE-MINERAL BONE DISORDER (CKD-MBD) WITH STAGE 5 CHRONIC KIDNEY DISEASE, ON CHRONIC DIALYSIS: ICD-10-CM

## 2024-12-18 DIAGNOSIS — E11.65 TYPE 2 DIABETES MELLITUS WITH HYPERGLYCEMIA, WITH LONG-TERM CURRENT USE OF INSULIN: ICD-10-CM

## 2024-12-18 DIAGNOSIS — Z99.2 CHRONIC KIDNEY DISEASE-MINERAL BONE DISORDER (CKD-MBD) WITH STAGE 5 CHRONIC KIDNEY DISEASE, ON CHRONIC DIALYSIS: ICD-10-CM

## 2024-12-18 DIAGNOSIS — Z12.11 SCREENING FOR COLON CANCER: ICD-10-CM

## 2024-12-18 DIAGNOSIS — N18.6 TYPE 2 DIABETES MELLITUS WITH CHRONIC KIDNEY DISEASE ON CHRONIC DIALYSIS, WITH LONG-TERM CURRENT USE OF INSULIN: ICD-10-CM

## 2024-12-18 DIAGNOSIS — R53.81 DEBILITY: ICD-10-CM

## 2024-12-18 DIAGNOSIS — I73.9 PERIPHERAL VASCULAR DISEASE: ICD-10-CM

## 2024-12-18 DIAGNOSIS — I50.42 CHRONIC COMBINED SYSTOLIC AND DIASTOLIC CONGESTIVE HEART FAILURE: Primary | ICD-10-CM

## 2024-12-18 DIAGNOSIS — F32.1 CURRENT MODERATE EPISODE OF MAJOR DEPRESSIVE DISORDER WITHOUT PRIOR EPISODE: ICD-10-CM

## 2024-12-18 DIAGNOSIS — Z12.31 ENCOUNTER FOR SCREENING MAMMOGRAM FOR MALIGNANT NEOPLASM OF BREAST: ICD-10-CM

## 2024-12-18 DIAGNOSIS — T82.868D THROMBOSIS OF FEMORO-POPLITEAL BYPASS GRAFT, SUBSEQUENT ENCOUNTER: ICD-10-CM

## 2024-12-18 DIAGNOSIS — L03.116 CELLULITIS OF LEFT THIGH: ICD-10-CM

## 2024-12-18 DIAGNOSIS — Z23 NEED FOR VACCINATION: Primary | ICD-10-CM

## 2024-12-18 DIAGNOSIS — I50.9 CHRONIC CONGESTIVE HEART FAILURE, UNSPECIFIED HEART FAILURE TYPE: ICD-10-CM

## 2024-12-18 DIAGNOSIS — I48.0 PAROXYSMAL ATRIAL FIBRILLATION: ICD-10-CM

## 2024-12-18 PROCEDURE — 1159F MED LIST DOCD IN RCRD: CPT | Mod: HCNC,CPTII,S$GLB,

## 2024-12-18 PROCEDURE — G0008 ADMIN INFLUENZA VIRUS VAC: HCPCS | Mod: HCNC,S$GLB,, | Performed by: STUDENT IN AN ORGANIZED HEALTH CARE EDUCATION/TRAINING PROGRAM

## 2024-12-18 PROCEDURE — 3008F BODY MASS INDEX DOCD: CPT | Mod: HCNC,CPTII,S$GLB, | Performed by: STUDENT IN AN ORGANIZED HEALTH CARE EDUCATION/TRAINING PROGRAM

## 2024-12-18 PROCEDURE — 1111F DSCHRG MED/CURRENT MED MERGE: CPT | Mod: HCNC,CPTII,S$GLB,

## 2024-12-18 PROCEDURE — 3044F HG A1C LEVEL LT 7.0%: CPT | Mod: HCNC,CPTII,S$GLB, | Performed by: STUDENT IN AN ORGANIZED HEALTH CARE EDUCATION/TRAINING PROGRAM

## 2024-12-18 PROCEDURE — 90480 ADMN SARSCOV2 VAC 1/ONLY CMP: CPT | Mod: HCNC,S$GLB,, | Performed by: STUDENT IN AN ORGANIZED HEALTH CARE EDUCATION/TRAINING PROGRAM

## 2024-12-18 PROCEDURE — 3077F SYST BP >= 140 MM HG: CPT | Mod: HCNC,CPTII,S$GLB, | Performed by: STUDENT IN AN ORGANIZED HEALTH CARE EDUCATION/TRAINING PROGRAM

## 2024-12-18 PROCEDURE — 4010F ACE/ARB THERAPY RXD/TAKEN: CPT | Mod: HCNC,CPTII,S$GLB,

## 2024-12-18 PROCEDURE — 3008F BODY MASS INDEX DOCD: CPT | Mod: HCNC,CPTII,S$GLB,

## 2024-12-18 PROCEDURE — 3066F NEPHROPATHY DOC TX: CPT | Mod: HCNC,CPTII,S$GLB, | Performed by: STUDENT IN AN ORGANIZED HEALTH CARE EDUCATION/TRAINING PROGRAM

## 2024-12-18 PROCEDURE — 3044F HG A1C LEVEL LT 7.0%: CPT | Mod: HCNC,CPTII,S$GLB,

## 2024-12-18 PROCEDURE — 1160F RVW MEDS BY RX/DR IN RCRD: CPT | Mod: HCNC,CPTII,S$GLB, | Performed by: STUDENT IN AN ORGANIZED HEALTH CARE EDUCATION/TRAINING PROGRAM

## 2024-12-18 PROCEDURE — 99214 OFFICE O/P EST MOD 30 MIN: CPT | Mod: HCNC,S$GLB,,

## 2024-12-18 PROCEDURE — 1159F MED LIST DOCD IN RCRD: CPT | Mod: HCNC,CPTII,S$GLB, | Performed by: STUDENT IN AN ORGANIZED HEALTH CARE EDUCATION/TRAINING PROGRAM

## 2024-12-18 PROCEDURE — 99999 PR PBB SHADOW E&M-EST. PATIENT-LVL V: CPT | Mod: PBBFAC,,,

## 2024-12-18 PROCEDURE — 3079F DIAST BP 80-89 MM HG: CPT | Mod: HCNC,CPTII,S$GLB, | Performed by: STUDENT IN AN ORGANIZED HEALTH CARE EDUCATION/TRAINING PROGRAM

## 2024-12-18 PROCEDURE — 3078F DIAST BP <80 MM HG: CPT | Mod: HCNC,CPTII,S$GLB,

## 2024-12-18 PROCEDURE — 99999 PR PBB SHADOW E&M-EST. PATIENT-LVL IV: CPT | Mod: PBBFAC,,, | Performed by: STUDENT IN AN ORGANIZED HEALTH CARE EDUCATION/TRAINING PROGRAM

## 2024-12-18 PROCEDURE — 1160F RVW MEDS BY RX/DR IN RCRD: CPT | Mod: HCNC,CPTII,S$GLB,

## 2024-12-18 PROCEDURE — 3066F NEPHROPATHY DOC TX: CPT | Mod: HCNC,CPTII,S$GLB,

## 2024-12-18 PROCEDURE — 99215 OFFICE O/P EST HI 40 MIN: CPT | Mod: 25,HCNC,S$GLB, | Performed by: STUDENT IN AN ORGANIZED HEALTH CARE EDUCATION/TRAINING PROGRAM

## 2024-12-18 PROCEDURE — 90656 IIV3 VACC NO PRSV 0.5 ML IM: CPT | Mod: HCNC,S$GLB,, | Performed by: STUDENT IN AN ORGANIZED HEALTH CARE EDUCATION/TRAINING PROGRAM

## 2024-12-18 PROCEDURE — 1111F DSCHRG MED/CURRENT MED MERGE: CPT | Mod: HCNC,CPTII,S$GLB, | Performed by: STUDENT IN AN ORGANIZED HEALTH CARE EDUCATION/TRAINING PROGRAM

## 2024-12-18 PROCEDURE — 3074F SYST BP LT 130 MM HG: CPT | Mod: HCNC,CPTII,S$GLB,

## 2024-12-18 PROCEDURE — 91322 SARSCOV2 VAC 50 MCG/0.5ML IM: CPT | Mod: HCNC,S$GLB,, | Performed by: STUDENT IN AN ORGANIZED HEALTH CARE EDUCATION/TRAINING PROGRAM

## 2024-12-18 PROCEDURE — 4010F ACE/ARB THERAPY RXD/TAKEN: CPT | Mod: HCNC,CPTII,S$GLB, | Performed by: STUDENT IN AN ORGANIZED HEALTH CARE EDUCATION/TRAINING PROGRAM

## 2024-12-18 RX ORDER — TRAZODONE HYDROCHLORIDE 50 MG/1
25 TABLET ORAL NIGHTLY PRN
COMMUNITY
Start: 2024-12-09 | End: 2024-12-18 | Stop reason: SDUPTHER

## 2024-12-18 RX ORDER — HYDROXYZINE HYDROCHLORIDE 25 MG/1
1 TABLET, FILM COATED ORAL
COMMUNITY
Start: 2024-01-12

## 2024-12-18 RX ORDER — TRAZODONE HYDROCHLORIDE 50 MG/1
100 TABLET ORAL NIGHTLY PRN
Qty: 90 TABLET | Refills: 0 | Status: SHIPPED | OUTPATIENT
Start: 2024-12-18 | End: 2025-03-18

## 2024-12-18 RX ORDER — SEVELAMER CARBONATE 2400 MG/1
1 POWDER, FOR SUSPENSION ORAL
COMMUNITY
Start: 2024-06-23

## 2024-12-18 RX ORDER — MULTIVITAMIN
1 TABLET ORAL
COMMUNITY
Start: 2024-01-12

## 2024-12-18 RX ORDER — BLOOD-GLUCOSE SENSOR
1 EACH MISCELLANEOUS DAILY
Qty: 5 EACH | Refills: 0 | Status: SHIPPED | OUTPATIENT
Start: 2024-12-18 | End: 2025-12-18

## 2024-12-18 RX ORDER — MUPIROCIN 20 MG/G
OINTMENT TOPICAL 2 TIMES DAILY
Qty: 30 G | Refills: 3 | Status: SHIPPED | OUTPATIENT
Start: 2024-12-18

## 2024-12-18 RX ORDER — SACUBITRIL AND VALSARTAN 24; 26 MG/1; MG/1
1 TABLET, FILM COATED ORAL 2 TIMES DAILY
Qty: 60 TABLET | Refills: 6 | Status: SHIPPED | OUTPATIENT
Start: 2024-12-18

## 2024-12-18 RX ORDER — FUROSEMIDE 40 MG/1
40 TABLET ORAL 2 TIMES DAILY
COMMUNITY

## 2024-12-18 RX ORDER — SEMAGLUTIDE 0.68 MG/ML
0.5 INJECTION, SOLUTION SUBCUTANEOUS
Qty: 3 ML | Refills: 0 | Status: SHIPPED | OUTPATIENT
Start: 2024-12-18 | End: 2025-01-17

## 2024-12-18 NOTE — ASSESSMENT & PLAN NOTE
"Patient has Combined Systolic and Diastolic heart failure that is Chronic. On presentation their CHF was well compensated. Most recent BNP and echo results are listed below.  No results for input(s): "BNP" in the last 72 hours.  Latest ECHO  Results for orders placed during the hospital encounter of 11/15/24    Echo    Interpretation Summary    Limited study to assess LV function and wall motion abnormalities.    Limited echocardiography to assess EF and wall motion    Left Ventricle: The left ventricle is mildly dilated measuring 5.4 cm. There is mild eccentric hypertrophy. There is hypokinesis of the anterior and inferior septum. There is moderately reduced systolic function. Ejection fraction is approximately 35-40%.    Right Ventricle: Severe right ventricular enlargement. Systolic function is severely reduced.    Left Atrium: Left atrium is severely dilated. Atrial septum is bulging to the left.    Right Atrium: Right atrium is mildly dilated.    Tricuspid Valve: There is moderate to severe regurgitation.    Pulmonary Artery: The estimated pulmonary artery systolic pressure is 22 mmHg. This may be underestimated due to increase right atrial pressure.    IVC/SVC: Elevated venous pressure at 15 mmHg. The IVC measures 3.5 cm in diameter.    Current Heart Failure Medications   -Carvedilol 3.125 mg, isosorbide dinitrate 5 mg BID, hydralazine 10 mg   -add Entresto 24-26 mg    Plan  - Low sodium diet  - Place on fluid restriction of 1.5 L.   -HD therapy 3 X week    -Carvedilol 3.125 mg, isosorbide dinitrate 5 mg BID, hydralazine 10 mg   -add Entresto 24-26 mg   -NM stress for ischemic evaluation   "

## 2024-12-18 NOTE — ASSESSMENT & PLAN NOTE
S/P CABG x 1 (1/14/2020),  S/P PCI of mid circumflex 80-85% stenosis with drug-eluting stent x1 (7/28/2020)  -continue apixaban 5 mg and clopidogrel  -continue atorvastatin 80 mg

## 2024-12-18 NOTE — PROGRESS NOTES
Patient ID: Suyapa Connelly is a 59 y.o. female.    Chief Complaint: Follow-up (Hospital f/u), bed sores, Pain, and oxycodone refill    History of Present Illness    CHIEF COMPLAINT:  Suyapa presents today for follow-up regarding cerebrovascular disease and associated symptoms.    NEUROLOGICAL SYMPTOMS:  She reports arm twitching that began 3-4 months ago and has increased in frequency over the past 3 weeks. The twitching occurs daily, sometimes preventing her from picking up objects. She experiences neck sensation prior to arm twitches.    DEPRESSION AND SLEEP:  She experiences depression with crying spells and fatigue with desire to rest. She takes Trazodone half tablet nightly without effective sleep improvement and reports morning grogginess. She desires to establish psychiatric care for depression and anxiety management, including medication management and counseling services.    RENAL:  She is under nephrology care and reports decreased urination frequency from baseline.    INTEGUMENTARY:  She reports poorly healing sores and is not currently seeing a wound clinic.    CURRENT MEDICATIONS:  She takes Lasix (furosemide) 40 mg twice daily, Eliquis, atorvastatin, carvedilol, sinacalcet, Clopidogrel (Plavix), and Trazodone.    MEDICAL HISTORY:  She was diagnosed with cerebrovascular disease approximately one year ago.      ROS:  General: -fever, -chills, +fatigue, -weight gain, -weight loss  Eyes: -vision changes, -redness, -discharge  ENT: -ear pain, -nasal congestion, -sore throat  Cardiovascular: -chest pain, -palpitations, -lower extremity edema  Respiratory: -cough, -shortness of breath  Gastrointestinal: -abdominal pain, -nausea, -vomiting, -diarrhea, -constipation, -blood in stool  Genitourinary: -dysuria, -hematuria, -frequency  Musculoskeletal: -joint pain, -muscle pain, +muscle spasms  Skin: -rash, +lesion  Neurological: -headache, -dizziness, -numbness, -tingling  Psychiatric: -anxiety, +depression,  -sleep difficulty         Physical Exam    General: No acute distress. Well-developed. Well-nourished.  Eyes: EOMI. Sclerae anicteric.  HENT: Normocephalic. Atraumatic. Nares patent. Moist oral mucosa.  Ears: Bilateral TMs clear. Bilateral EACs clear.  Cardiovascular: Regular rate. Regular rhythm. No murmurs. No rubs. No gallops. Normal S1, S2.  Respiratory: Normal respiratory effort. Clear to auscultation bilaterally. No rales. No rhonchi. No wheezing.  Abdomen: Soft. Non-tender. Non-distended. Normoactive bowel sounds.  Musculoskeletal: No  obvious deformity.  Extremities: No lower extremity edema.  Neurological: Alert & oriented x3. No slurred speech. Normal gait.  Psychiatric: Normal mood. Normal affect. Good insight. Good judgment.  Skin: Warm. Dry. No rash.         Assessment & Plan    CEREBROVASCULAR DISEASE AND ARM SYMPTOMS:  - Assessed cerebrovascular disease and associated symptoms, including arm twitching and weakness.  - Considered fatigue from hemodialysis as potential cause of arm tremors.  - Suyapa to continue exercises and movements recommended by physical therapy to strengthen arms.  - Suyapa to use resistance bands for arm strengthening exercises.  - Suyapa to perform balloon exercises as instructed by physical therapy.  - Referred to neurologist for evaluation of arm twitching and weakness.  - Contact office if arm twitching or weakness worsens.    END STAGE RENAL DISEASE AND DIALYSIS:  - Considered fatigue from hemodialysis as potential cause of arm tremors.  - Continued Lasix (furosemide) 40mg twice daily.    HYPERTENSION:  - Evaluated blood pressure management, noting Entresto addition should help.  - Started Entresto for blood pressure management, to be titrated based on tolerance.    TYPE 2 DIABETES MELLITUS:  - Reviewed diabetes management, with recent A1C of 5.9% noted as excellent.  - Discussed impact of milk and juice consumption on blood sugar.  - Diabetic eye exam ordered.    PRESSURE  ULCERS AND WOUND CARE:  - Assessed wound healing progress, particularly pressure sores from hospital stay.  - Educated on proper use of moisture barrier creams and ointments for pressure sores.  - Suyapa to move and reposition frequently to prevent pressure sores.  - Referred to wound care clinic for management of pressure sores.    DEPRESSION AND ANXIETY:  - Considered behavioral health needs, including depression and past anxiety.  - Increased Trazodone from 50mg to 100mg at bedtime for sleep.  - Referred to behavioral health specialist for depression and medication management.    WEIGHT MANAGEMENT:  - Started Ozempic at lowest possible dose for weight management; discontinue if nausea or vomiting occurs.  - Explained potential side effects of Ozempic, including nausea and vomiting.  - Contact office if experiencing nausea or vomiting with Ozempic.    MEDICATION MANAGEMENT:  - Continued Eliquis, atorvastatin, carvedilol, and sinacalcet as previously prescribed.  - Continued clopidogrel (Plavix) as recommended by cardiology.    VACCINATIONS:  - Flu vaccine administered.  - COVID-19 vaccine administered.  - Tetanus vaccine administered.    PREVENTIVE CARE:  - Urine test ordered.  - Mammogram ordered.  - Colon cancer screening kit ordered.  - Referred to gynecologist for routine care and pap smear.    FOLLOW-UP:  - Follow up in 3 months.       1. Need for vaccination  -     COVID-19 (Moderna) 50 mcg/0.5 mL IM vaccine (>/= 11 yo) 0.5 mL  -     influenza (Flulaval, Fluzone, Fluarix) 45 mcg/0.5 mL IM vaccine (> or = 6 mo) 0.5 mL    2. Type 2 diabetes mellitus with hyperglycemia, with long-term current use of insulin  -     Diabetic Eye Screening Photo; Future    3. Screening for colon cancer  -     Cologuard Screening (Multitarget Stool DNA); Future; Expected date: 12/18/2024    4. Screening for cervical cancer  -     Ambulatory referral/consult to Obstetrics / Gynecology; Future; Expected date: 12/25/2024    5.  Encounter for screening mammogram for malignant neoplasm of breast  -     Mammo Digital Screening Bilat w/ Vignesh; Future; Expected date: 12/18/2024    6. CAROLIN (generalized anxiety disorder)  -     Ambulatory referral/consult to Behavioral Health; Future; Expected date: 12/25/2024    7. Current moderate episode of major depressive disorder without prior episode  -     Ambulatory referral/consult to Behavioral Health; Future; Expected date: 12/25/2024    8. Pressure injury of skin, unspecified injury stage, unspecified location  -     Ambulatory referral/consult to Wound Clinic; Future; Expected date: 12/25/2024    Other orders  -     traZODone (DESYREL) 50 MG tablet; Take 2 tablets (100 mg total) by mouth nightly as needed for Insomnia.  Dispense: 90 tablet; Refill: 0  -     semaglutide (OZEMPIC) 0.25 mg or 0.5 mg (2 mg/3 mL) pen injector; Inject 0.5 mg into the skin every 7 days.  Dispense: 3 mL; Refill: 0  -     blood-glucose sensor (DEXCOM G7 SENSOR) Radha; 1 each by Misc.(Non-Drug; Combo Route) route once daily.  Dispense: 5 each; Refill: 0  -     mupirocin (BACTROBAN) 2 % ointment; Apply topically 2 (two) times daily.  Dispense: 30 g; Refill: 3  -     zinc oxide 10 % Oint; Apply 1 Application topically 2 (two) times a day.  Dispense: 226.8 g; Refill: 0              No follow-ups on file.    This note was generated with the assistance of ambient listening technology. Verbal consent was obtained by the patient and accompanying visitor(s) for the recording of patient appointment to facilitate this note. I attest to having reviewed and edited the generated note for accuracy, though some syntax or spelling errors may persist. Please contact the author of this note for any clarification.

## 2024-12-18 NOTE — TELEPHONE ENCOUNTER
Spoke to pt  and informed him that the insurance will not cover it but that the [facility] in [place] should have it OTC for him to . Pt  stated verbal understanding.

## 2024-12-18 NOTE — ASSESSMENT & PLAN NOTE
Body mass index is 47.76 kg/m². Morbid obesity complicates all aspects of disease management from diagnostic modalities to treatment. Weight loss encouraged and health benefits explained to patient.

## 2024-12-18 NOTE — PROGRESS NOTES
Subjective:    Patient ID:  Suyapa Connelly is a 59 y.o. female who presents for evaluation of No chief complaint on file.      PCP: Vanessa Noel MD     Referring Provider: Abdullahi Rojo MD    HPI: Patient is a 60 yo F w/ PNH of HTN, CHF, CAD, S/P CABG x 1 (1/14/2020),  S/P PCI of mid circumflex 80-85% stenosis with drug-eluting stent x1, HLD, ESRD w HD (M-W-F) bilateral BKA, DM-2 who presents today to establish care and management of heart failure. She was admitted 11/15/24-12/4/24 septic shock d/t LLE cellulitis.   Hospital course:  s/p Aortogram and BLE angiogram with left common iliac stent placement and left AKA stump debridement on 11/22.   On chart review, noted pt had previous echo with severely reduced EF (15% in May 20, 2024) and WMA. Repeat echo 11/29 EF 35-40%, hypokinesis of anterior and inferior septum, RV with severely reduced systolic function, moderate to severe TR. Previously on bidil and Coreg. BP low through admission, Isordil and Coreg dose decreased, hydralazine held. Discharged on that regimen and recommend close follow up with Cardiology   Pt and  declined discharge to SNF. CM/SW assisted with obtaining home wound VAC and pt discharged home with home health.     She presents today with her .   She has not followed with Cardiology in years and is S/P CABG in 2020.   She reports SOB. She also reports episodes of lightheadedness. She notes 2 pillow orthopnea.   Patient denies CP,palpitations, orthopnea, PND, presyncope, LOC, swelling, or claudication. Patient does not monitor her home BP.    Patient reports medication compliance without side effects.   Patient does not exercise regularly, RIVERA BKA.    Past Medical History:   Diagnosis Date    Anxiety     Chronic pain syndrome     CKD (chronic kidney disease), stage III     CVD (cardiovascular disease)     Depression     Diabetes mellitus, type 2     GERD (gastroesophageal reflux disease)     Hyperemesis 03/23/2021     Hypokalemia 03/23/2021    Infection of below knee amputation stump 03/12/2022    Osteomyelitis     Osteomyelitis of left foot 04/30/2021    Ulcer of left foot     Vaginal delivery     x1     Past Surgical History:   Procedure Laterality Date    ABOVE-KNEE AMPUTATION Left 5/18/2021    Procedure: AMPUTATION, ABOVE KNEE;  Surgeon: Teddy Huber MD;  Location: [facility] OR [address] [place] [address];  Service: Vascular;  Laterality: Left;    ABOVE-KNEE AMPUTATION Right 3/18/2022    Procedure: AMPUTATION, ABOVE KNEE;  Surgeon: DANYE Florez II, MD;  Location: [address];  Service: Vascular;  Laterality: Right;    Angiogram - Right Extremity Right 7/9/15    ANGIOGRAM, EXTREMITY, UNILATERAL Left 11/22/2024    Procedure: ANGIOGRAM, EXTREMITY, UNILATERAL;  Surgeon: Yony Lindsey MD;  Location: [facility] OR [address] [place] [address];  Service: Vascular;  Laterality: Left;  Left leg angio, L CFA approach  5.4 min  597.31 mGy  73.8959 Gycm2  20 ml dye    angiogram-left leg  10/6/15    ANGIOGRAPHY OF LOWER EXTREMITY Left 4/29/2021    Procedure: ANGIOGRAM, LOWER EXTREMITY;  Surgeon: Teddy Huber MD;  Location: [facility] OR [address] [place] [address];  Service: Vascular;  Laterality: Left;    BELOW KNEE AMPUTATION OF LOWER EXTREMITY Right 12/28/2021    Procedure: AMPUTATION, BELOW KNEE;  Surgeon: Kaitlyn Rojas MD;  Location: [facility];  Service: General;  Laterality: Right;    CATHETERIZATION OF BOTH LEFT AND RIGHT HEART N/A 12/18/2019    Procedure: CATHETERIZATION, HEART, BOTH LEFT AND RIGHT;  Surgeon: Que Fernando III, MD;  Location: [facility] CATH LAB;  Service: Cardiology;  Laterality: N/A;    CORONARY ANGIOGRAPHY N/A 12/18/2019    Procedure: ANGIOGRAM, CORONARY ARTERY;  Surgeon: Que Fernando III, MD;  Location: [facility] CATH LAB;  Service: Cardiology;  Laterality: N/A;    CORONARY ANGIOGRAPHY INCLUDING BYPASS GRAFTS WITH CATHETERIZATION OF LEFT HEART N/A 7/28/2020    Procedure: ANGIOGRAM, CORONARY, INCLUDING BYPASS GRAFT, WITH LEFT HEART  CATHETERIZATION, 9 am;  Surgeon: Rachel Easley MD;  Location: [facility] CATH LAB;  Service: Cardiology;  Laterality: N/A;    CORONARY ARTERY BYPASS GRAFT (CABG) N/A 1/14/2020    Procedure: CORONARY ARTERY BYPASS GRAFT (CABG) x 1     Off Pump;  Surgeon: Huang Altamirano MD;  Location: [facility] OR [address] [place] [address];  Service: Cardiovascular;  Laterality: N/A;    CREATION OF FEMORAL-TIBIAL ARTERY BYPASS Left 4/29/2021    Procedure: CREATION, BYPASS, ARTERIAL, FEMORAL TO ANTERIOR TIBIAL;  Surgeon: Teddy Huber MD;  Location: [facility] OR [address] [place] [address];  Service: Vascular;  Laterality: Left;    CREATION OF FEMOROPOPLITEAL ARTERIAL BYPASS USING GRAFT Left 8/18/2020    Procedure: CREATION, BYPASS, ARTERIAL, FEMORAL TO POPLITEAL, USING GRAFT, LEFT LOWER EXTREMITY;  Surgeon: Teddy Huber MD;  Location: [facility];  Service: Vascular;  Laterality: Left;  REQUEST 7:15 A.M. START----COVID NEGATIVE ON 8/17  1ST CASE STARTKENYA PER LEANA ON 8/7/2020 @ 942AM-  RN PREOP 8/12/2020   T/S-----CLEARED BY CARDS-------PENDING INSURANCE    DEBRIDEMENT OF FOOT Left 9/8/2020    Procedure: DEBRIDEMENT, FOOT;  Surgeon: Rosio Mayes DPM;  Location: [facility];  Service: Podiatry;  Laterality: Left;  request neoxx .   RN Pre Op 9-4-2020, Covid negative on 9/5/20. C A    DEBRIDEMENT OF FOOT  3/4/2021    Procedure: DEBRIDEMENT, FOOT;  Surgeon: Teddy Huber MD;  Location: [facility] OR;  Service: Vascular;;    DEBRIDEMENT OF FOOT Left 3/9/2021    Procedure: DEBRIDEMENT, FOOT, bone biopsy;  Surgeon: Rosio Mayes DPM;  Location: [facility] OR;  Service: Podiatry;  Laterality: Left;  Request neoxx---COVID IN AM  REQUESTING NOON START  RN Phone Pre op.On Blood thinners Plavix and Eliquis.  Covid am of surgery. C A    DEBRIDEMENT OF FOOT Left 5/4/2021    Procedure: DEBRIDEMENT, FOOT;  Surgeon: Farooq Morley DPM;  Location: [facility] OR 2ND FLR;  Service: Podiatry;  Laterality: Left;    ESOPHAGOGASTRODUODENOSCOPY N/A 11/7/2024    Procedure: EGD  (ESOPHAGOGASTRODUODENOSCOPY);  Surgeon: Yony Cancino MD;  Location: [facility] ENDO;  Service: Endoscopy;  Laterality: N/A;    INSERTION OF TUNNELED CENTRAL VENOUS HEMODIALYSIS CATHETER N/A 1/27/2020    Procedure: Insertion, Catheter, Central Venous, Hemodialysis;  Surgeon: ESTEBAN Gomez III, MD;  Location: [facility] CATH LAB;  Service: Peripheral Vascular;  Laterality: N/A;    INSERTION OF TUNNELED CENTRAL VENOUS HEMODIALYSIS CATHETER  5/10/2023    Procedure: Insertion, Catheter, Central Venous, Hemodialysis;  Surgeon: Romulo Queen MD;  Location: [facility] CATH LAB;  Service: Cardiology;;    IRRIGATION AND DEBRIDEMENT OF LOWER EXTREMITY Left 11/22/2024    Procedure: IRRIGATION AND DEBRIDEMENT, LOWER EXTREMITY;  Surgeon: Yony Lindsey MD;  [address];  Service: Vascular;  Laterality: Left;    MAGNETIC RESONANCE IMAGING Left 11/19/2024    Procedure: MRI (Magnetic Resonance Imagine);  Surgeon: Sophia Hernandez;  Location: [facility];  Service: Anesthesiology;  Laterality: Left;    PERCUTANEOUS TRANSLUMINAL ANGIOPLASTY N/A 3/4/2021    Procedure: PTA (ANGIOPLASTY, PERCUTANEOUS, TRANSLUMINAL);  Surgeon: Teddy Huber MD;  Location: [facility] OR;  Service: Vascular;  Laterality: N/A;    REMOVAL OF ARTERIOVENOUS GRAFT Left 5/27/2021    Procedure: REMOVAL, GRAFT, LEFT LOWER EXTREMITY, WOUND EXPLORATION;  Surgeon: Teddy Huber MD;  Location: [facility] 2ND FLR;  Service: Vascular;  Laterality: Left;    REMOVAL OF NAIL OF DIGIT Left 3/9/2021    Procedure: REMOVAL, NAIL, DIGIT;  Surgeon: Rosio Mayes DPM;  Location: [facility] OR;  Service: Podiatry;  Laterality: Left;    RIGHT HEART CATHETERIZATION Right 5/10/2023    Procedure: INSERTION, CATHETER, RIGHT HEART;  Surgeon: Romulo Queen MD;  Location: [facility] CATH LAB;  Service: Cardiology;  Laterality: Right;    STENT, ILIAC ARTERY Left 11/22/2024    Procedure: STENT, ILIAC ARTERY;  Surgeon: Yony Lindsey MD;  Location: [facility] OR  2ND FLR;  Service: Vascular;  Laterality: Left;    THROMBECTOMY Left 3/4/2021    Procedure: THROMBECTOMY, LEFT LOWER EXTREMITY BYPASS GRAFT, ANGIOGRAM, POSSIBLE INTERVENTION, POSSIBLE LEFT LOWER EXTREMITY BYPASS;  Surgeon: Teddy Huber MD;  Location: [facility] OR;  Service: Vascular;  Laterality: Left;    THROMBECTOMY Left 4/29/2021    Procedure: GRAFT THROMBECTOMY, LEFT LOWER EXTREMITY;  Surgeon: Teddy Huber MD;  Location: [facility] OR 2ND FLR;  Service: Vascular;  Laterality: Left;  14.5 min  1179.85 mGy  341.01 Gycm2  240 ml dye    THROMBECTOMY  10/22/2021    Procedure: THROMBECTOMY;  Surgeon: Saad Arenas MD;  Location: [facility] CATH LAB/EP;  Service: Cardiology;;     Social History     Socioeconomic History    Marital status:    Occupational History    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      Social Drivers of Health     Financial Resource Strain: Low Risk  (11/16/2024)    Overall Financial Resource Strain (CARDIA)     Difficulty of Paying Living Expenses: Not hard at all   Recent Concern: Financial Resource Strain - Medium Risk (10/29/2024)    Overall Financial Resource Strain (CARDIA)     Difficulty of Paying Living Expenses: Somewhat hard   Food Insecurity: No Food Insecurity (11/16/2024)    Hunger Vital Sign     Worried About Running Out of Food in the Last Year: Never true     Ran Out of Food in the Last Year: Never true   Transportation Needs: No Transportation Needs (11/16/2024)    TRANSPORTATION NEEDS     Transportation : No   Physical Activity: Inactive (11/14/2024)    Exercise Vital Sign     Days of Exercise per Week: 0 days     Minutes of Exercise per Session: 0 min   Stress: No Stress Concern  Present (11/16/2024)    [employer] - Occupational Stress Questionnaire     Feeling of Stress : Not at all   Recent Concern: Stress - Stress Concern Present (10/29/2024)    [employer] - Occupational Stress Questionnaire     Feeling of Stress : To some extent   Housing Stability: Low Risk  (11/16/2024)    Housing Stability Vital Sign     Unable to Pay for Housing in the Last Year: No     Homeless in the Last Year: No     Family History   Problem Relation Name Age of Onset    Diabetes Mother      Diabetes Father      Heart disease Maternal Grandmother      No Known Problems Maternal Grandfather      Diabetes Paternal Grandmother      No Known Problems Paternal Grandfather      Anesthesia problems Neg Hx         Review of patient's allergies indicates:   Allergen Reactions    Ciprofloxacin Itching    Pcn [penicillins]      Rash; tolerated ceftriaxone on 1/13/20  Tolerating Augmentin November 2024       Medication List with Changes/Refills   New Medications    BLOOD-GLUCOSE SENSOR (DEXCOM G7 SENSOR) ANANTH    1 each by Misc.(Non-Drug; Combo Route) route once daily.    MUPIROCIN (BACTROBAN) 2 % OINTMENT    Apply topically 2 (two) times daily.    SACUBITRIL-VALSARTAN (ENTRESTO) 24-26 MG PER TABLET    Take 1 tablet by mouth 2 (two) times daily.    SEMAGLUTIDE (OZEMPIC) 0.25 MG OR 0.5 MG (2 MG/3 ML) PEN INJECTOR    Inject 0.5 mg into the skin every 7 days.    ZINC OXIDE 10 % OINT    Apply 1 Application topically 2 (two) times a day.   Current Medications    ACETAMINOPHEN (TYLENOL) 325 MG TABLET    Take 2 tablets (650 mg total) by mouth every 8 (eight) hours as needed for Pain.    ALLOPURINOL (ZYLOPRIM) 100 MG TABLET    Take 0.5 tablets (50 mg total) by mouth every other day.    APIXABAN (ELIQUIS) 5 MG TAB    Take 1 tablet (5 mg total) by mouth 2 (two) times daily.    ATORVASTATIN (LIPITOR) 80 MG TABLET    Take 1 tablet (80 mg total) by mouth once daily.    BLOOD SUGAR  DIAGNOSTIC STRP    1 each by Misc.(Non-Drug; Combo Route) route 4 (four) times daily before meals and nightly.    BLOOD-GLUCOSE METER MISC    Before meals and at bedtime    BLOOD-GLUCOSE SENSOR (DEXCOM G7 SENSOR) ANANTH    1 each by Misc.(Non-Drug; Combo Route) route once daily.    CALCITRIOL (ROCALTROL) 0.5 MCG CAP    Take 1 capsule (0.5 mcg total) by mouth once daily.    CARVEDILOL (COREG) 3.125 MG TABLET    Take 1 tablet (3.125 mg total) by mouth 2 (two) times daily.    CINACALCET (SENSIPAR) 30 MG TAB    Take 1 tablet (30 mg total) by mouth every Mon, Wed, Fri.    CLOPIDOGREL (PLAVIX) 75 MG TABLET    Take 1 tablet (75 mg total) by mouth once daily.    EPOETIN SHABANA (PROCRIT) 4,000 UNIT/ML INJECTION    Inject 1 mL (4,000 Units total) into the skin every Mon, Wed, Fri.    FAMOTIDINE (PEPCID) 20 MG TABLET    Take 1 tablet (20 mg total) by mouth once daily.    FUROSEMIDE (LASIX) 40 MG TABLET    Take 40 mg by mouth 2 (two) times daily.    HYDRALAZINE (APRESOLINE) 10 MG TABLET    Take 1 tablet (10 mg total) by mouth every 12 (twelve) hours.    HYDROXYZINE HCL (ATARAX) 25 MG TABLET    Take 1 tablet by mouth.    INSULIN ASPART U-100 (NOVOLOG) 100 UNIT/ML (3 ML) INPN PEN    Inject 0-10 Units into the skin before meals and at bedtime as needed (Hyperglycemia). **MODERATE CORRECTION DOSE**  Blood Glucose  mg/dL                  Pre-meal                2200  151-200                2 units                    1 unit  201-250                4 units                    2 units  251-300                6 units                    3 units  301-350                8 units                    4 units  >350                     10 units                  5 units  Administer subcutaneously if needed at times designated by monitoring  schedule.    INSULIN GLARGINE U-100, LANTUS, 100 UNIT/ML (3 ML) SUBQ INPN PEN    Inject 5 Units into the skin every evening.    ISOSORBIDE DINITRATE (ISORDIL) 10 MG TABLET    Take 0.5 tablets (5 mg total) by mouth  "2 (two) times daily.    LANCETS 33 GAUGE MISC    TEST 3 TIMES DAILY    MULTIVIT WITH MIN-FOLIC ACID (ONE DAILY WOMENS 50 PLUS) 0.4 MG TAB    Take 1 tablet by mouth.    PEN NEEDLE, DIABETIC 33 GAUGE X 5/32" NDLE    1 each by Misc.(Non-Drug; Combo Route) route 4 (four) times daily before meals and nightly.    PREGABALIN (LYRICA) 75 MG CAPSULE    Take 1 capsule (75 mg total) by mouth Daily.    RENVELA 2.4 GRAM PWPK    Take 1 packet by mouth.    SERTRALINE (ZOLOFT) 100 MG TABLET    Take 1 tablet (100 mg total) by mouth once daily.    SODIUM BICARBONATE 650 MG TABLET    TAKE 1 TABLET(650 MG) BY MOUTH THREE TIMES DAILY    VITAMIN RENAL FORMULA, B-COMPLEX-VITAMIN C-FOLIC ACID, (NEPHROCAP) 1 MG CAP    Take 1 capsule by mouth once daily.   Changed and/or Refilled Medications    Modified Medication Previous Medication    TRAZODONE (DESYREL) 50 MG TABLET traZODone (DESYREL) 50 MG tablet       Take 2 tablets (100 mg total) by mouth nightly as needed for Insomnia.    Take 25 mg by mouth nightly as needed.       Review of Systems   Constitutional: Positive for malaise/fatigue. Negative for diaphoresis and fever.   HENT:  Negative for congestion and hearing loss.    Eyes:  Negative for blurred vision and pain.   Cardiovascular:  Negative for chest pain, claudication, dyspnea on exertion, leg swelling, near-syncope, palpitations and syncope.   Respiratory:  Positive for shortness of breath and sleep disturbances due to breathing.    Hematologic/Lymphatic: Negative for bleeding problem. Does not bruise/bleed easily.   Skin:  Positive for poor wound healing. Negative for color change.   Gastrointestinal:  Negative for abdominal pain and nausea.   Genitourinary:  Negative for bladder incontinence and flank pain.   Neurological:  Positive for weakness. Negative for focal weakness and light-headedness.   Psychiatric/Behavioral:  Positive for depression.         Objective:   /70 (BP Location: Right arm, Patient Position: Sitting)  "  Pulse 72   Ht 4' (1.219 m)   Wt 71 kg (156 lb 8.4 oz)   LMP 09/30/2015 (Exact Date)   SpO2 97%   BMI 47.76 kg/m²    Physical Exam  Constitutional:       Appearance: She is well-developed. She is obese. She is not diaphoretic.   HENT:      Head: Normocephalic and atraumatic.   Eyes:      General: No scleral icterus.     Pupils: Pupils are equal, round, and reactive to light.   Neck:      Vascular: No JVD.   Cardiovascular:      Rate and Rhythm: Normal rate and regular rhythm.      Pulses: Intact distal pulses.           Radial pulses are 2+ on the right side and 2+ on the left side.      Heart sounds: S1 normal and S2 normal. No murmur heard.     No friction rub. No gallop.      Comments: RIVERA AKA   Pulmonary:      Effort: Pulmonary effort is normal. No respiratory distress.      Breath sounds: Normal breath sounds. No wheezing or rales.   Chest:      Chest wall: No tenderness.   Abdominal:      General: Bowel sounds are normal. There is no distension.      Palpations: Abdomen is soft. There is no mass.      Tenderness: There is no abdominal tenderness. There is no rebound.   Musculoskeletal:         General: No tenderness. Normal range of motion.      Cervical back: Normal range of motion and neck supple.      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      Left Lower Extremity: Left leg is amputated above knee.   Skin:     General: Skin is warm and dry.      Coloration: Skin is not pale.             Comments: Wound VAC in place Left AKA stump    Neurological:      Mental Status: She is alert and oriented to person, place, and time.      Coordination: Coordination normal.      Deep Tendon Reflexes: Reflexes normal.   Psychiatric:         Behavior: Behavior normal.         Judgment: Judgment normal.             Cardiac echo 11/29/24  Summary    Limited study to assess LV function and wall motion abnormalities.    Limited echocardiography to assess EF and wall motion    Left Ventricle: The left  ventricle is mildly dilated measuring 5.4 cm. There is mild eccentric hypertrophy. There is hypokinesis of the anterior and inferior septum. There is moderately reduced systolic function. Ejection fraction is approximately 35-40%.    Right Ventricle: Severe right ventricular enlargement. Systolic function is severely reduced.    Left Atrium: Left atrium is severely dilated. Atrial septum is bulging to the left.    Right Atrium: Right atrium is mildly dilated.    Tricuspid Valve: There is moderate to severe regurgitation.    Pulmonary Artery: The estimated pulmonary artery systolic pressure is 22 mmHg. This may be underestimated due to increase right atrial pressure.    IVC/SVC: Elevated venous pressure at 15 mmHg. The IVC measures 3.5 cm in diameter.      Procedure(s) (LRB): 11/22/24  Ultrasound guided access L CFA   Aortogram, bilateral lower extremity angiogram   L common iliac stent 8x39mm VBX   Perclose closure L CFA   Debridement of L AKA stump   Application of Puraply 6x9cm x2     Operative Findings: Successful revascularization with L ABIOLA 8x30mm VBX   Debridement of L AKA to healthy tissue measuring [id number]   Application of Puraply to wound bed DO NOT REMOVE BEFORE 7 DAYS (11/29)    Cardiac echo 10/29/24  Summary    Left Ventricle: The left ventricle is normal in size. Mildly increased wall thickness. Regional wall motion abnormalities present. Septal motion is abnormal is consistent with bundle branch block.    Right Ventricle: Severe right ventricular enlargement. Systolic function is severely reduced.    Left Atrium: Atrial septum is bulging to the left.    Right Atrium: Right atrium is severely dilated.    Aortic Valve: The aortic valve is a trileaflet valve. There is mild aortic valve sclerosis. There is mild aortic regurgitation with a centrally directed jet.    Mitral Valve: There is mild regurgitation with a centrally directed jet.    Tricuspid Valve: There is severe regurgitation with a centrally  directed jet.    Pulmonary Artery: No pulmonary hypertension. The estimated pulmonary artery systolic pressure is 26 mmHg.    IVC/SVC: Elevated venous pressure at 15 mmHg.      Cardiac LHC 7/28/2020  Summary  LVEDP (Pre): 16  Estimated blood loss: <50 mL     Successful IVUS guided PCI of mid circumflex 80-85% stenosis with drug-eluting stent x1.  A 3.5 into 26 mm drug-eluting stent was implanted with excellent angiographic results and with IVUS.  SUSIE 3 flow post PCI     Coronary angiogram:     Left main:  No significant stenosis     Lad:  Severe proximal LAD stenosis.  Distal LAD gets supply from LIMA to LAD.  Lima to LAD is widely patent.  No significant stenosis noted after touchdown of LIMA to LAD.     Circumflex:  Mid circumflex 80-85% stenosis     RCA:   in the mid RCA.  Distal RCA gets supply from well-developed collaterals from the left        Assessment:       1. Chronic combined systolic and diastolic congestive heart failure    2. CHF (congestive heart failure)    3. Coronary artery disease involving native coronary artery of native heart without angina pectoris    4. Atherosclerosis of native coronary artery of native heart with angina pectoris    5. Paroxysmal atrial fibrillation    6. Peripheral vascular disease    7. S/P CABG x 1    8. S/P femoral-popliteal bypass surgery    9. Thrombosis of femoro-popliteal bypass graft, subsequent encounter    10. 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 Morbid obesity with BMI of 50.0-59.9, adult    12. Type 2 diabetes mellitus with chronic kidney disease on chronic dialysis, with long-term current use of insulin    13. S/P AKA (above knee amputation) bilateral    14. Debility    15. Cellulitis of left thigh    16. CAROLIN (generalized anxiety disorder)         Plan:         Chronic combined systolic and diastolic congestive heart failure  Patient has Combined Systolic and Diastolic heart  "failure that is Chronic. On presentation their CHF was well compensated. Most recent BNP and echo results are listed below.  No results for input(s): "BNP" in the last 72 hours.  Latest ECHO  Results for orders placed during the hospital encounter of 11/15/24    Echo    Interpretation Summary    Limited study to assess LV function and wall motion abnormalities.    Limited echocardiography to assess EF and wall motion    Left Ventricle: The left ventricle is mildly dilated measuring 5.4 cm. There is mild eccentric hypertrophy. There is hypokinesis of the anterior and inferior septum. There is moderately reduced systolic function. Ejection fraction is approximately 35-40%.    Right Ventricle: Severe right ventricular enlargement. Systolic function is severely reduced.    Left Atrium: Left atrium is severely dilated. Atrial septum is bulging to the left.    Right Atrium: Right atrium is mildly dilated.    Tricuspid Valve: There is moderate to severe regurgitation.    Pulmonary Artery: The estimated pulmonary artery systolic pressure is 22 mmHg. This may be underestimated due to increase right atrial pressure.    IVC/SVC: Elevated venous pressure at 15 mmHg. The IVC measures 3.5 cm in diameter.    Current Heart Failure Medications   -Carvedilol 3.125 mg, isosorbide dinitrate 5 mg BID, hydralazine 10 mg   -add Entresto 24-26 mg    Plan  - Low sodium diet  - Place on fluid restriction of 1.5 L.   -HD therapy 3 X week    -Carvedilol 3.125 mg, isosorbide dinitrate 5 mg BID, hydralazine 10 mg   -add Entresto 24-26 mg   -NM stress for ischemic evaluation     CAD (coronary artery disease)  S/P CABG x 1 (1/14/2020),  S/P PCI of mid circumflex 80-85% stenosis with drug-eluting stent x1 (7/28/2020)  -continue apixaban 5 mg and clopidogrel  -continue atorvastatin 80 mg     Atherosclerosis of native coronary artery of native heart with angina pectoris  S/P CABG x 1 (1/14/2020),  S/P PCI of mid circumflex 80-85% stenosis with " drug-eluting stent x1 (7/28/2020)  -continue apixaban 5 mg and clopidogrel  -continue atorvastatin 80 mg     Paroxysmal atrial fibrillation  -continue AC therapy- apixaban 5 mg     Peripheral vascular disease  s/p Aortogram and BLE angiogram with left common iliac stent placement and left AKA stump debridement on 11/22.   Procedure(s) (LRB):  Ultrasound guided access L CFA   Aortogram, bilateral lower extremity angiogram   L common iliac stent 8x39mm VBX   Perclose closure L CFA   Debridement of L AKA stump   Application of Puraply 6x9cm x2   -Followed by Vascular medicine     S/P CABG x 1  S/P CABG x 1 (1/14/2020),  S/P PCI of mid circumflex 80-85% stenosis with drug-eluting stent x1  -continue atorvastatin, clopidogrel, isosorbide dinitrate     S/P femoral-popliteal bypass surgery  -Followed by Vascular medicine     Thrombosis of femoro-popliteal bypass graft  -Followed by Vascular medicine  -continue apixaban     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 Morbid obesity complicates all aspects of disease management from diagnostic modalities to treatment. Weight loss encouraged and health benefits explained to patient.       Type 2 diabetes mellitus with chronic kidney disease on chronic dialysis, with long-term current use of insulin  -Gaol A1c < 7%  -A1c 5.9  -controlled  -continue medical therapy per PCP     S/P AKA (above knee amputation) bilateral  -RIVERA AKA, wound VAC to left leg     Debility  -Wheelchair bound  - is primary caretaker     Cellulitis of left thigh  -Followed by PCP and Vascular medicine  -wound VAC in place     CAROLIN (generalized anxiety disorder)  -Followed by PCP  -continue medical therapy       Total duration of face to face visit time 30 minutes.  Total time spent counseling greater  than fifty percent of total visit time.  Counseling included discussion regarding imaging findings, diagnosis, possibilities, treatment options, risks and benefits.  The patient had many questions regarding the options and long-term effects      Buzz Manjarrez DNP  [facility]

## 2024-12-18 NOTE — ASSESSMENT & PLAN NOTE
S/P CABG x 1 (1/14/2020),  S/P PCI of mid circumflex 80-85% stenosis with drug-eluting stent x1  -continue atorvastatin, clopidogrel, isosorbide dinitrate

## 2024-12-18 NOTE — TELEPHONE ENCOUNTER
Contacted- Pts spouse to advise him of her appointments and advised him I will be mailing her End Of Visit.    Thank you,    Brooklyn Torrez  Medical Assistant   [facility]   [phone]-Phone  [phone]-Fax

## 2024-12-18 NOTE — ASSESSMENT & PLAN NOTE
s/p Aortogram and BLE angiogram with left common iliac stent placement and left AKA stump debridement on 11/22.   Procedure(s) (LRB):  Ultrasound guided access L CFA   Aortogram, bilateral lower extremity angiogram   L common iliac stent 8x39mm VBX   Perclose closure L CFA   Debridement of L AKA stump   Application of Puraply 6x9cm x2   -Followed by Vascular medicine

## 2024-12-19 ENCOUNTER — TELEPHONE (OUTPATIENT)
Dept: FAMILY MEDICINE | Facility: CLINIC | Age: 59
End: 2024-12-19
Payer: MEDICARE

## 2024-12-19 NOTE — TELEPHONE ENCOUNTER
----- Message from Swetha sent at 12/19/2024  3:02 PM CST -----  Regarding: Lori Calling from [facility] [phone]  Contact: Lori Calling from [facility] [phone]  Who Called: Lori Calling from [facility] [phone]    Calling to talk to nurse in regards to patients orders for a power wheel chair. Please advice

## 2024-12-19 NOTE — TELEPHONE ENCOUNTER
----- Message from Swetha sent at 12/19/2024  3:02 PM CST -----  Regarding: Lori Calling from [facility] [phone]  Contact: Lori Calling from [facility] [phone]  Who Called: Lori Calling from [facility] [phone]    Calling to talk to nurse in regards to patients orders for a power wheel chair. Please advice

## 2024-12-19 NOTE — TELEPHONE ENCOUNTER
"----- Message from Ligia sent at 12/19/2024  9:49 AM CST -----  Regarding: Per SymptomScreen screening, patient has been instructed to call 911 immediately. Please reach out directly to patient with any further directives."## regarding the pt having cognitive issues regarding talking, confusion, etc which started 2 days ago  Type:  Needs Medical Advice    Who Called: Pt's  Mr Connelly  Symptoms (please be specific): Per SymptomScreen screening, patient has been instructed to call 911 immediately. Please reach out directly to patient with any further directives."## regarding the pt having cognitive issues regarding talking, confusion, etc which started 2 days ago  How long has patient had these symptoms: Started 2 days ago  Best Call Back Number: [phone]  "

## 2024-12-19 NOTE — TELEPHONE ENCOUNTER
Called and spoke with Lori and she states she just needed to know what dme the order was sent to and I informed her that it was [facility]. She understood verbally.

## 2024-12-23 RX ORDER — PEN NEEDLE, DIABETIC 33 GX5/32"
1 NEEDLE, DISPOSABLE MISCELLANEOUS
Start: 2024-12-23

## 2024-12-23 NOTE — TELEPHONE ENCOUNTER
"----- Message from Bonita sent at 12/23/2024 10:44 AM CST -----  Type:  RX Refill Request    Who Called: pt    Refill or New Rx:refill  RX Name and Strength:pen needle, diabetic 33 gauge x 5/32" Ndle  How is the patient currently taking it? (ex. 1XDay): 1 each by Misc.(Non-Drug; Combo Route) route 4 (four) times daily before meals and nightly. - Misc.(Non-Drug; Combo Route  Is this a 30 day or 90 day RX:N/A  Preferred Pharmacy with phone number:[id number] [address] [facility] [address] [facility] [address]   Phone: [phone]  Fax: [phone]  Local or Mail Order:Local   Ordering Provider:  Would the patient rather a call back or a response via [facility]? Call   Best Call Back Number: [phone]  Additional Information:      Type:  RX Refill Request      RX Name and Strength:blood sugar diagnostic Strp  How is the patient currently taking it? (ex. 1XDay):1 each by Misc.(Non-Drug; Combo Route) route 4 (four) times daily before meals and nightly. - Misc.(Non-Drug; Combo Route)  Is this a 30 day or 90 day RX:N/A     Pt  stated he is at the pharmacy waiting on the prescription  "

## 2024-12-27 ENCOUNTER — TELEPHONE (OUTPATIENT)
Dept: FAMILY MEDICINE | Facility: CLINIC | Age: 59
End: 2024-12-27
Payer: MEDICARE

## 2024-12-27 NOTE — TELEPHONE ENCOUNTER
----- Message from Courtney sent at 12/27/2024  2:06 PM CST -----  Type:  Needs Medical Advice    Who Called: christopher  Would the patient rather a call back or a response via [facility]? call  Best Call Back Number:  ext [phone]   Additional Information: would like to schedule peer to peer for pt wheelchair before 12.31.2024

## 2024-12-27 NOTE — TELEPHONE ENCOUNTER
RECEIVED CALL FROM PT  REQUESTING A BEDSIDE COMMODE to be ordered, can you send order and visit notes to DME company? Also requesting for home health to order XL gloves to use when providing patient care. Is it okay for home health to order gloves?

## 2024-12-27 NOTE — TELEPHONE ENCOUNTER
Returned called to Tatyana Prescott ( ext [phone]) in regards to pts peer to peer for wheelchair before 12/31/24. I lvm letting her know that the provider is out of the office until Jan 2 and if she wants to discuss she can call [phone].

## 2024-12-30 NOTE — TELEPHONE ENCOUNTER
Spoke to pt spouse and they stated that they need the UroLift chair. I informed him that I would let the Dr know, he also stated that the pt needs more pen needles.

## 2024-12-30 NOTE — TELEPHONE ENCOUNTER
----- Message from Padmini sent at 12/30/2024 12:57 PM CST -----  Contact: spouse  Type:  Needs Medical Advice    Who Called: Randell (spouse)  Would the patient rather a call back or a response via [facility]? call  Best Call Back Number: [phone]  Additional Information: spouse is requesting a call to discuss care. Spouse states a uro lift is needed

## 2024-12-31 RX ORDER — PEN NEEDLE, DIABETIC 33 GX5/32"
1 NEEDLE, DISPOSABLE MISCELLANEOUS
Start: 2024-12-31

## 2025-01-02 RX ORDER — LANCETS 33 GAUGE
EACH MISCELLANEOUS
Qty: 100 EACH | Refills: 3 | Status: SHIPPED | OUTPATIENT
Start: 2025-01-02

## 2025-01-02 NOTE — TELEPHONE ENCOUNTER
----- Message from Tamicafélixia sent at 1/2/2025  9:54 AM CST -----  Type:  RX Refill Request    Who Called: pT SPOUSE  Refill or New Rx:REFILL  RX Name and Strength:blood sugar diagnostic Strp  How is the patient currently taking it? (ex. 1XDay):Route: 1 each by Misc.(Non-Drug; Combo Route) route 4 (four) times daily before meals and nightly. - Misc.(Non-Drug; Combo Route)  Is this a 30 day or 90 day RX:  Preferred Pharmacy with phone number:[id number] - [facility] [address] [facility] [address] [facility] [address]   Phone: [phone]  Fax: [phone]  Local or Mail Order:LOCAL  Ordering Provider:Vanesas Noel MD      Refill or New Rx:REFILL  RX Name and Strength:lancets 33 gauge Misc  How is the patient currently taking it? (ex. 1XDay):TEST 3 TIMES DAILY  Is this a 30 day or 90 day RX:100

## 2025-01-06 ENCOUNTER — PATIENT OUTREACH (OUTPATIENT)
Dept: ADMINISTRATIVE | Facility: HOSPITAL | Age: 60
End: 2025-01-06
Payer: MEDICARE

## 2025-01-06 RX ORDER — ALLOPURINOL 100 MG/1
TABLET ORAL
Qty: 8 TABLET | Refills: 11 | Status: SHIPPED | OUTPATIENT
Start: 2025-01-06

## 2025-01-06 RX ORDER — FAMOTIDINE 20 MG/1
20 TABLET, FILM COATED ORAL 2 TIMES DAILY
Qty: 180 TABLET | Refills: 0 | Status: SHIPPED | OUTPATIENT
Start: 2025-01-06

## 2025-01-06 RX ORDER — CARVEDILOL 6.25 MG/1
6.25 TABLET ORAL
Qty: 180 TABLET | Refills: 3 | Status: SHIPPED | OUTPATIENT
Start: 2025-01-06

## 2025-01-06 NOTE — PROGRESS NOTES
Health Maintenance Topic(s) Outreach Outcomes & Actions Taken:    Breast Cancer Screening - Outreach Outcomes & Actions Taken  : Mammogram Screening Scheduled

## 2025-01-10 ENCOUNTER — TELEPHONE (OUTPATIENT)
Dept: FAMILY MEDICINE | Facility: CLINIC | Age: 60
End: 2025-01-10
Payer: MEDICARE

## 2025-01-10 DIAGNOSIS — Z89.611 S/P AKA (ABOVE KNEE AMPUTATION) BILATERAL: Primary | ICD-10-CM

## 2025-01-10 DIAGNOSIS — Z89.612 S/P AKA (ABOVE KNEE AMPUTATION) BILATERAL: Primary | ICD-10-CM

## 2025-01-14 RX ORDER — PEN NEEDLE, DIABETIC 33 GX5/32"
1 NEEDLE, DISPOSABLE MISCELLANEOUS
Start: 2025-01-14

## 2025-01-14 RX ORDER — PEN NEEDLE, DIABETIC 33 GX5/32"
1 NEEDLE, DISPOSABLE MISCELLANEOUS
Start: 2025-01-14 | End: 2025-01-14 | Stop reason: SDUPTHER

## 2025-01-14 NOTE — TELEPHONE ENCOUNTER
Pharmacy stated that the pt needs refill for blood strips and pen needle, she has refill of lancets. Pt  has refill for all three.

## 2025-01-14 NOTE — TELEPHONE ENCOUNTER
----- Message from Swetha sent at 1/14/2025  8:28 AM CST -----  Regarding: Call back  Contact: [phone]  Who Called: PT     Patient is calling to talk to nurse in regards to see if her diabetic supplies were sent to the pharmacy.

## 2025-01-14 NOTE — TELEPHONE ENCOUNTER
Spoke to pt  and he stated that they went to [facility] and they said that the pt requires a new prescription. I stated that I would call them.

## 2025-01-15 ENCOUNTER — TELEPHONE (OUTPATIENT)
Dept: CARDIOLOGY | Facility: CLINIC | Age: 60
End: 2025-01-15
Payer: MEDICARE

## 2025-01-15 NOTE — TELEPHONE ENCOUNTER
Please inform patient to contact the Nephrologist ( Kidney doctor) or primary care provider to initiate the referral to transplant team.    Thank you,  Buzz Manjarrez DNP     Reached out to Ms. Connelly and spoke to Mr. Connelly and explained to him to talk to her nephrologist to make the referral.    He agree.  Thank you,    Brooklyn Torrez  Medical Assistant   [facility]   [phone]-Phone  [phone]-Fax

## 2025-01-15 NOTE — TELEPHONE ENCOUNTER
Mr. Connelly came in to talk to Buzz about getting his wife set up for transplant. Pt was seeing a transplant dr in [facility]. I advised Mr Connelly that I would forward this to Buzz to see if she can get the patient set up with [facility] transplant coordinator.    Thank you,    Brooklyn Torrez  Medical Assistant   [facility]   [phone]-Phone  [phone]-Fax

## 2025-01-16 ENCOUNTER — TELEPHONE (OUTPATIENT)
Dept: FAMILY MEDICINE | Facility: CLINIC | Age: 60
End: 2025-01-16
Payer: MEDICARE

## 2025-01-16 DIAGNOSIS — G89.21 CHRONIC PAIN AFTER AMPUTATION: ICD-10-CM

## 2025-01-16 DIAGNOSIS — Z89.9 CHRONIC PAIN AFTER AMPUTATION: ICD-10-CM

## 2025-01-16 RX ORDER — PREGABALIN 75 MG/1
75 CAPSULE ORAL DAILY
Qty: 7 CAPSULE | Refills: 0 | Status: SHIPPED | OUTPATIENT
Start: 2025-01-16

## 2025-01-16 RX ORDER — PREGABALIN 75 MG/1
75 CAPSULE ORAL DAILY
Qty: 7 CAPSULE | Refills: 0 | OUTPATIENT
Start: 2025-01-16

## 2025-01-16 RX ORDER — SEMAGLUTIDE 0.68 MG/ML
0.5 INJECTION, SOLUTION SUBCUTANEOUS
Qty: 3 ML | Refills: 0 | Status: SHIPPED | OUTPATIENT
Start: 2025-01-16 | End: 2025-02-15

## 2025-01-16 NOTE — TELEPHONE ENCOUNTER
----- Message from Karina sent at 1/16/2025 12:30 PM CST -----  Type:  RX Refill Request    Who Called: Pt  Refill or New Rx:Rx   RX Name and Strength:pregabalin (LYRICA) 75 MG capsule  How is the patient currently taking it? (ex. 1XDay):  Is this a 30 day or 90 day RX:  Preferred Pharmacy with phone number:[facility] #[id number] - [facility] [address] [address] [address] [facility] [address]   Phone: [phone]  Fax: [phone]  Would the patient rather a call back or a response via [facility]? Call back   Best Call Back Number: [phone]  Additional Information:

## 2025-01-16 NOTE — TELEPHONE ENCOUNTER
Spoke to pt  and let him know that the medication was sent to [facility] and that he would contact [facility] and let them know he would like prescriptions to go through them by mail. Pt stated verbal understanding.

## 2025-01-16 NOTE — TELEPHONE ENCOUNTER
Spoke to pt  and he stated that she was not receiving lyrica from any doctor but that she was prescribed it while in hospital because gabapentin was building up in the body because she was not urinating properly.

## 2025-01-24 ENCOUNTER — HOSPITAL ENCOUNTER (OUTPATIENT)
Facility: HOSPITAL | Age: 60
Discharge: HOME | End: 2025-01-25
Attending: EMERGENCY MEDICINE | Admitting: EMERGENCY MEDICINE
Payer: MEDICARE

## 2025-01-24 DIAGNOSIS — Z99.2 DIALYSIS PATIENT: ICD-10-CM

## 2025-01-24 DIAGNOSIS — R07.9 CHEST PAIN: ICD-10-CM

## 2025-01-24 DIAGNOSIS — N18.6 ESRD ON DIALYSIS: Primary | ICD-10-CM

## 2025-01-24 DIAGNOSIS — Z99.2 ESRD (END STAGE RENAL DISEASE) ON DIALYSIS: ICD-10-CM

## 2025-01-24 DIAGNOSIS — N18.6 ESRD (END STAGE RENAL DISEASE) ON DIALYSIS: ICD-10-CM

## 2025-01-24 DIAGNOSIS — Z99.2 ESRD ON DIALYSIS: Primary | ICD-10-CM

## 2025-01-24 LAB
ALBUMIN SERPL BCP-MCNC: 3.1 G/DL (ref 3.5–5.2)
ALP SERPL-CCNC: 177 U/L (ref 40–150)
ALT SERPL W/O P-5'-P-CCNC: 12 U/L (ref 10–44)
ANION GAP SERPL CALC-SCNC: 15 MMOL/L (ref 8–16)
AST SERPL-CCNC: 26 U/L (ref 10–40)
BASOPHILS # BLD AUTO: 0.07 K/UL (ref 0–0.2)
BASOPHILS NFR BLD: 1.5 % (ref 0–1.9)
BILIRUB SERPL-MCNC: 0.4 MG/DL (ref 0.1–1)
BUN SERPL-MCNC: 64 MG/DL (ref 6–20)
CALCIUM SERPL-MCNC: 8.3 MG/DL (ref 8.7–10.5)
CHLORIDE SERPL-SCNC: 104 MMOL/L (ref 95–110)
CO2 SERPL-SCNC: 16 MMOL/L (ref 23–29)
CREAT SERPL-MCNC: 4.8 MG/DL (ref 0.5–1.4)
DIFFERENTIAL METHOD BLD: ABNORMAL
EOSINOPHIL # BLD AUTO: 0.1 K/UL (ref 0–0.5)
EOSINOPHIL NFR BLD: 2.9 % (ref 0–8)
ERYTHROCYTE [DISTWIDTH] IN BLOOD BY AUTOMATED COUNT: 19.1 % (ref 11.5–14.5)
EST. GFR  (NO RACE VARIABLE): 9.9 ML/MIN/1.73 M^2
GLUCOSE SERPL-MCNC: 156 MG/DL (ref 70–110)
HCT VFR BLD AUTO: 34.5 % (ref 37–48.5)
HGB BLD-MCNC: 10.7 G/DL (ref 12–16)
IMM GRANULOCYTES # BLD AUTO: 0.01 K/UL (ref 0–0.04)
IMM GRANULOCYTES NFR BLD AUTO: 0.2 % (ref 0–0.5)
LYMPHOCYTES # BLD AUTO: 0.8 K/UL (ref 1–4.8)
LYMPHOCYTES NFR BLD: 15.8 % (ref 18–48)
MCH RBC QN AUTO: 30.7 PG (ref 27–31)
MCHC RBC AUTO-ENTMCNC: 31 G/DL (ref 32–36)
MCV RBC AUTO: 99 FL (ref 82–98)
MONOCYTES # BLD AUTO: 0.5 K/UL (ref 0.3–1)
MONOCYTES NFR BLD: 11.3 % (ref 4–15)
NEUTROPHILS # BLD AUTO: 3.3 K/UL (ref 1.8–7.7)
NEUTROPHILS NFR BLD: 68.3 % (ref 38–73)
NRBC BLD-RTO: 0 /100 WBC
PLATELET # BLD AUTO: 236 K/UL (ref 150–450)
PMV BLD AUTO: 10.6 FL (ref 9.2–12.9)
POTASSIUM SERPL-SCNC: 4.5 MMOL/L (ref 3.5–5.1)
PROT SERPL-MCNC: 8.5 G/DL (ref 6–8.4)
RBC # BLD AUTO: 3.49 M/UL (ref 4–5.4)
SODIUM SERPL-SCNC: 135 MMOL/L (ref 136–145)
WBC # BLD AUTO: 4.76 K/UL (ref 3.9–12.7)

## 2025-01-24 PROCEDURE — 93010 ELECTROCARDIOGRAM REPORT: CPT | Mod: ,,, | Performed by: INTERNAL MEDICINE

## 2025-01-24 PROCEDURE — 94761 N-INVAS EAR/PLS OXIMETRY MLT: CPT

## 2025-01-24 PROCEDURE — 80053 COMPREHEN METABOLIC PANEL: CPT | Performed by: EMERGENCY MEDICINE

## 2025-01-24 PROCEDURE — 93005 ELECTROCARDIOGRAM TRACING: CPT

## 2025-01-24 PROCEDURE — 99285 EMERGENCY DEPT VISIT HI MDM: CPT | Mod: 25

## 2025-01-24 PROCEDURE — 85025 COMPLETE CBC W/AUTO DIFF WBC: CPT | Performed by: EMERGENCY MEDICINE

## 2025-01-24 PROCEDURE — G0378 HOSPITAL OBSERVATION PER HR: HCPCS

## 2025-01-24 PROCEDURE — 25000003 PHARM REV CODE 250: Performed by: FAMILY MEDICINE

## 2025-01-24 RX ORDER — ONDANSETRON HYDROCHLORIDE 2 MG/ML
4 INJECTION, SOLUTION INTRAVENOUS EVERY 8 HOURS PRN
Status: DISCONTINUED | OUTPATIENT
Start: 2025-01-25 | End: 2025-01-26 | Stop reason: HOSPADM

## 2025-01-24 RX ORDER — TALC
6 POWDER (GRAM) TOPICAL NIGHTLY PRN
Status: DISCONTINUED | OUTPATIENT
Start: 2025-01-24 | End: 2025-01-26 | Stop reason: HOSPADM

## 2025-01-24 RX ORDER — GLUCAGON 1 MG
1 KIT INJECTION
Status: DISCONTINUED | OUTPATIENT
Start: 2025-01-24 | End: 2025-01-26 | Stop reason: HOSPADM

## 2025-01-24 RX ORDER — FUROSEMIDE 40 MG/1
40 TABLET ORAL 2 TIMES DAILY
Status: DISCONTINUED | OUTPATIENT
Start: 2025-01-24 | End: 2025-01-26 | Stop reason: HOSPADM

## 2025-01-24 RX ORDER — SERTRALINE HYDROCHLORIDE 100 MG/1
100 TABLET, FILM COATED ORAL DAILY
Status: DISCONTINUED | OUTPATIENT
Start: 2025-01-25 | End: 2025-01-26 | Stop reason: HOSPADM

## 2025-01-24 RX ORDER — ALLOPURINOL 100 MG/1
100 TABLET ORAL EVERY OTHER DAY
Status: DISCONTINUED | OUTPATIENT
Start: 2025-01-25 | End: 2025-01-26 | Stop reason: HOSPADM

## 2025-01-24 RX ORDER — ALBUTEROL SULFATE 90 UG/1
2 INHALANT RESPIRATORY (INHALATION) EVERY 6 HOURS PRN
Status: DISCONTINUED | OUTPATIENT
Start: 2025-01-24 | End: 2025-01-26 | Stop reason: HOSPADM

## 2025-01-24 RX ORDER — ISOSORBIDE DINITRATE 5 MG/1
5 TABLET ORAL 2 TIMES DAILY
Status: DISCONTINUED | OUTPATIENT
Start: 2025-01-24 | End: 2025-01-26 | Stop reason: HOSPADM

## 2025-01-24 RX ORDER — ACETAMINOPHEN 500 MG
1000 TABLET ORAL EVERY 8 HOURS PRN
Status: DISCONTINUED | OUTPATIENT
Start: 2025-01-24 | End: 2025-01-26 | Stop reason: HOSPADM

## 2025-01-24 RX ORDER — HYDRALAZINE HYDROCHLORIDE 10 MG/1
10 TABLET, FILM COATED ORAL EVERY 12 HOURS
Status: DISCONTINUED | OUTPATIENT
Start: 2025-01-24 | End: 2025-01-26 | Stop reason: HOSPADM

## 2025-01-24 RX ORDER — SODIUM CHLORIDE 0.9 % (FLUSH) 0.9 %
10 SYRINGE (ML) INJECTION
Status: DISCONTINUED | OUTPATIENT
Start: 2025-01-24 | End: 2025-01-26 | Stop reason: HOSPADM

## 2025-01-24 RX ORDER — CARVEDILOL 6.25 MG/1
6.25 TABLET ORAL 2 TIMES DAILY WITH MEALS
Status: DISCONTINUED | OUTPATIENT
Start: 2025-01-25 | End: 2025-01-26 | Stop reason: HOSPADM

## 2025-01-24 RX ORDER — OXYCODONE HYDROCHLORIDE 10 MG/1
10 TABLET ORAL EVERY 6 HOURS PRN
Status: DISCONTINUED | OUTPATIENT
Start: 2025-01-24 | End: 2025-01-26 | Stop reason: HOSPADM

## 2025-01-24 RX ORDER — IBUPROFEN 200 MG
16 TABLET ORAL
Status: DISCONTINUED | OUTPATIENT
Start: 2025-01-24 | End: 2025-01-26 | Stop reason: HOSPADM

## 2025-01-24 RX ORDER — FAMOTIDINE 20 MG/1
20 TABLET, FILM COATED ORAL DAILY
Status: DISCONTINUED | OUTPATIENT
Start: 2025-01-25 | End: 2025-01-26 | Stop reason: HOSPADM

## 2025-01-24 RX ORDER — SODIUM BICARBONATE 650 MG/1
650 TABLET ORAL 3 TIMES DAILY
Status: DISCONTINUED | OUTPATIENT
Start: 2025-01-24 | End: 2025-01-26 | Stop reason: HOSPADM

## 2025-01-24 RX ORDER — INSULIN GLARGINE 100 [IU]/ML
5 INJECTION, SOLUTION SUBCUTANEOUS NIGHTLY
Status: DISCONTINUED | OUTPATIENT
Start: 2025-01-24 | End: 2025-01-26 | Stop reason: HOSPADM

## 2025-01-24 RX ORDER — IBUPROFEN 200 MG
24 TABLET ORAL
Status: DISCONTINUED | OUTPATIENT
Start: 2025-01-24 | End: 2025-01-26 | Stop reason: HOSPADM

## 2025-01-24 RX ORDER — ATORVASTATIN CALCIUM 40 MG/1
80 TABLET, FILM COATED ORAL DAILY
Status: DISCONTINUED | OUTPATIENT
Start: 2025-01-25 | End: 2025-01-26 | Stop reason: HOSPADM

## 2025-01-24 RX ORDER — ALUMINUM HYDROXIDE, MAGNESIUM HYDROXIDE, AND SIMETHICONE 1200; 120; 1200 MG/30ML; MG/30ML; MG/30ML
30 SUSPENSION ORAL 4 TIMES DAILY PRN
Status: DISCONTINUED | OUTPATIENT
Start: 2025-01-24 | End: 2025-01-26 | Stop reason: HOSPADM

## 2025-01-24 RX ORDER — POLYETHYLENE GLYCOL 3350 17 G/17G
17 POWDER, FOR SOLUTION ORAL DAILY
Status: DISCONTINUED | OUTPATIENT
Start: 2025-01-25 | End: 2025-01-26 | Stop reason: HOSPADM

## 2025-01-24 RX ORDER — TALC
6 POWDER (GRAM) TOPICAL NIGHTLY PRN
Status: DISCONTINUED | OUTPATIENT
Start: 2025-01-24 | End: 2025-01-24

## 2025-01-24 RX ORDER — CLOPIDOGREL BISULFATE 75 MG/1
75 TABLET ORAL DAILY
Status: DISCONTINUED | OUTPATIENT
Start: 2025-01-25 | End: 2025-01-26 | Stop reason: HOSPADM

## 2025-01-24 RX ORDER — INSULIN ASPART 100 [IU]/ML
0-10 INJECTION, SOLUTION INTRAVENOUS; SUBCUTANEOUS
Status: DISCONTINUED | OUTPATIENT
Start: 2025-01-24 | End: 2025-01-26 | Stop reason: HOSPADM

## 2025-01-24 RX ORDER — CALCITRIOL 0.5 UG/1
0.5 CAPSULE ORAL DAILY
Status: DISCONTINUED | OUTPATIENT
Start: 2025-01-25 | End: 2025-01-26 | Stop reason: HOSPADM

## 2025-01-24 RX ORDER — CINACALCET 30 MG/1
30 TABLET, FILM COATED ORAL
Status: DISCONTINUED | OUTPATIENT
Start: 2025-01-27 | End: 2025-01-26 | Stop reason: HOSPADM

## 2025-01-24 RX ORDER — TRAZODONE HYDROCHLORIDE 100 MG/1
100 TABLET ORAL NIGHTLY PRN
Status: DISCONTINUED | OUTPATIENT
Start: 2025-01-24 | End: 2025-01-26 | Stop reason: HOSPADM

## 2025-01-24 RX ORDER — SEVELAMER CARBONATE FOR ORAL SUSPENSION 2400 MG/1
2.4 POWDER, FOR SUSPENSION ORAL DAILY
Status: DISCONTINUED | OUTPATIENT
Start: 2025-01-25 | End: 2025-01-26 | Stop reason: HOSPADM

## 2025-01-24 RX ORDER — SACUBITRIL AND VALSARTAN 24; 26 MG/1; MG/1
1 TABLET, FILM COATED ORAL 2 TIMES DAILY
Status: DISCONTINUED | OUTPATIENT
Start: 2025-01-24 | End: 2025-01-26 | Stop reason: HOSPADM

## 2025-01-24 RX ORDER — NALOXONE HCL 0.4 MG/ML
0.02 VIAL (ML) INJECTION
Status: DISCONTINUED | OUTPATIENT
Start: 2025-01-24 | End: 2025-01-26 | Stop reason: HOSPADM

## 2025-01-24 RX ORDER — SODIUM CHLORIDE 0.9 % (FLUSH) 0.9 %
10 SYRINGE (ML) INJECTION EVERY 12 HOURS PRN
Status: DISCONTINUED | OUTPATIENT
Start: 2025-01-24 | End: 2025-01-26 | Stop reason: HOSPADM

## 2025-01-24 RX ADMIN — FUROSEMIDE 40 MG: 40 TABLET ORAL at 10:01

## 2025-01-24 RX ADMIN — ISOSORBIDE DINITRATE 5 MG: 5 TABLET ORAL at 10:01

## 2025-01-24 RX ADMIN — APIXABAN 5 MG: 5 TABLET, FILM COATED ORAL at 10:01

## 2025-01-24 RX ADMIN — SACUBITRIL AND VALSARTAN 1 TABLET: 24; 26 TABLET, FILM COATED ORAL at 10:01

## 2025-01-24 RX ADMIN — HYDRALAZINE HYDROCHLORIDE 10 MG: 10 TABLET ORAL at 10:01

## 2025-01-24 RX ADMIN — SODIUM BICARBONATE 650 MG TABLET 650 MG: at 10:01

## 2025-01-25 VITALS
DIASTOLIC BLOOD PRESSURE: 68 MMHG | TEMPERATURE: 98 F | BODY MASS INDEX: 47.6 KG/M2 | HEART RATE: 83 BPM | WEIGHT: 156 LBS | OXYGEN SATURATION: 98 % | SYSTOLIC BLOOD PRESSURE: 156 MMHG | RESPIRATION RATE: 16 BRPM

## 2025-01-25 LAB
ALBUMIN SERPL BCP-MCNC: 2.8 G/DL (ref 3.5–5.2)
ALP SERPL-CCNC: 166 U/L (ref 40–150)
ALT SERPL W/O P-5'-P-CCNC: 9 U/L (ref 10–44)
ANION GAP SERPL CALC-SCNC: 13 MMOL/L (ref 8–16)
AST SERPL-CCNC: 16 U/L (ref 10–40)
BASOPHILS # BLD AUTO: 0.07 K/UL (ref 0–0.2)
BASOPHILS NFR BLD: 1.6 % (ref 0–1.9)
BILIRUB SERPL-MCNC: 0.3 MG/DL (ref 0.1–1)
BUN SERPL-MCNC: 64 MG/DL (ref 6–20)
CALCIUM SERPL-MCNC: 7.9 MG/DL (ref 8.7–10.5)
CHLORIDE SERPL-SCNC: 105 MMOL/L (ref 95–110)
CO2 SERPL-SCNC: 17 MMOL/L (ref 23–29)
CREAT SERPL-MCNC: 5.2 MG/DL (ref 0.5–1.4)
DIFFERENTIAL METHOD BLD: ABNORMAL
EOSINOPHIL # BLD AUTO: 0.2 K/UL (ref 0–0.5)
EOSINOPHIL NFR BLD: 3.6 % (ref 0–8)
ERYTHROCYTE [DISTWIDTH] IN BLOOD BY AUTOMATED COUNT: 19.3 % (ref 11.5–14.5)
EST. GFR  (NO RACE VARIABLE): 9 ML/MIN/1.73 M^2
GLUCOSE SERPL-MCNC: 157 MG/DL (ref 70–110)
GLUCOSE SERPL-MCNC: 164 MG/DL (ref 70–110)
HBV SURFACE AG SERPL QL IA: NORMAL
HCT VFR BLD AUTO: 33.9 % (ref 37–48.5)
HGB BLD-MCNC: 9.9 G/DL (ref 12–16)
IMM GRANULOCYTES # BLD AUTO: 0.01 K/UL (ref 0–0.04)
IMM GRANULOCYTES NFR BLD AUTO: 0.2 % (ref 0–0.5)
LYMPHOCYTES # BLD AUTO: 0.8 K/UL (ref 1–4.8)
LYMPHOCYTES NFR BLD: 18.2 % (ref 18–48)
MAGNESIUM SERPL-MCNC: 2 MG/DL (ref 1.6–2.6)
MCH RBC QN AUTO: 30.3 PG (ref 27–31)
MCHC RBC AUTO-ENTMCNC: 29.2 G/DL (ref 32–36)
MCV RBC AUTO: 104 FL (ref 82–98)
MONOCYTES # BLD AUTO: 0.8 K/UL (ref 0.3–1)
MONOCYTES NFR BLD: 17.3 % (ref 4–15)
NEUTROPHILS # BLD AUTO: 2.6 K/UL (ref 1.8–7.7)
NEUTROPHILS NFR BLD: 59.1 % (ref 38–73)
NRBC BLD-RTO: 0 /100 WBC
OHS QRS DURATION: 82 MS
OHS QTC CALCULATION: 471 MS
PHOSPHATE SERPL-MCNC: 4.4 MG/DL (ref 2.7–4.5)
PLATELET # BLD AUTO: 226 K/UL (ref 150–450)
PMV BLD AUTO: 10.3 FL (ref 9.2–12.9)
POCT GLUCOSE: 151 MG/DL (ref 70–110)
POCT GLUCOSE: 152 MG/DL (ref 70–110)
POCT GLUCOSE: 164 MG/DL (ref 70–110)
POCT GLUCOSE: 166 MG/DL (ref 70–110)
POCT GLUCOSE: 334 MG/DL (ref 70–110)
POTASSIUM SERPL-SCNC: 4.1 MMOL/L (ref 3.5–5.1)
PROT SERPL-MCNC: 7.4 G/DL (ref 6–8.4)
RBC # BLD AUTO: 3.27 M/UL (ref 4–5.4)
SODIUM SERPL-SCNC: 135 MMOL/L (ref 136–145)
WBC # BLD AUTO: 4.45 K/UL (ref 3.9–12.7)

## 2025-01-25 PROCEDURE — 82962 GLUCOSE BLOOD TEST: CPT

## 2025-01-25 PROCEDURE — 96372 THER/PROPH/DIAG INJ SC/IM: CPT | Performed by: FAMILY MEDICINE

## 2025-01-25 PROCEDURE — G0257 UNSCHED DIALYSIS ESRD PT HOS: HCPCS

## 2025-01-25 PROCEDURE — G0378 HOSPITAL OBSERVATION PER HR: HCPCS

## 2025-01-25 PROCEDURE — 99214 OFFICE O/P EST MOD 30 MIN: CPT | Mod: 25,,, | Performed by: NURSE PRACTITIONER

## 2025-01-25 PROCEDURE — 63600175 PHARM REV CODE 636 W HCPCS: Performed by: NURSE PRACTITIONER

## 2025-01-25 PROCEDURE — 84100 ASSAY OF PHOSPHORUS: CPT | Performed by: FAMILY MEDICINE

## 2025-01-25 PROCEDURE — 25000003 PHARM REV CODE 250: Performed by: NURSE PRACTITIONER

## 2025-01-25 PROCEDURE — 87340 HEPATITIS B SURFACE AG IA: CPT | Performed by: STUDENT IN AN ORGANIZED HEALTH CARE EDUCATION/TRAINING PROGRAM

## 2025-01-25 PROCEDURE — 63600175 PHARM REV CODE 636 W HCPCS: Performed by: FAMILY MEDICINE

## 2025-01-25 PROCEDURE — 85025 COMPLETE CBC W/AUTO DIFF WBC: CPT | Performed by: FAMILY MEDICINE

## 2025-01-25 PROCEDURE — 25000003 PHARM REV CODE 250: Performed by: FAMILY MEDICINE

## 2025-01-25 PROCEDURE — 83735 ASSAY OF MAGNESIUM: CPT | Performed by: FAMILY MEDICINE

## 2025-01-25 PROCEDURE — 90935 HEMODIALYSIS ONE EVALUATION: CPT | Mod: ,,, | Performed by: NURSE PRACTITIONER

## 2025-01-25 PROCEDURE — 80053 COMPREHEN METABOLIC PANEL: CPT | Performed by: FAMILY MEDICINE

## 2025-01-25 RX ORDER — SODIUM CHLORIDE 9 MG/ML
INJECTION, SOLUTION INTRAVENOUS ONCE
Status: COMPLETED | OUTPATIENT
Start: 2025-01-25 | End: 2025-01-25

## 2025-01-25 RX ORDER — HEPARIN SODIUM 1000 [USP'U]/ML
1000 INJECTION, SOLUTION INTRAVENOUS; SUBCUTANEOUS
Status: DISCONTINUED | OUTPATIENT
Start: 2025-01-25 | End: 2025-01-26 | Stop reason: HOSPADM

## 2025-01-25 RX ADMIN — ISOSORBIDE DINITRATE 5 MG: 5 TABLET ORAL at 09:01

## 2025-01-25 RX ADMIN — SODIUM CHLORIDE: 9 INJECTION, SOLUTION INTRAVENOUS at 03:01

## 2025-01-25 RX ADMIN — SEVELAMER CARBONATE 2.4 G: 2400 POWDER, FOR SUSPENSION ORAL at 10:01

## 2025-01-25 RX ADMIN — SODIUM BICARBONATE 650 MG TABLET 650 MG: at 09:01

## 2025-01-25 RX ADMIN — CARVEDILOL 6.25 MG: 6.25 TABLET, FILM COATED ORAL at 09:01

## 2025-01-25 RX ADMIN — SODIUM BICARBONATE 650 MG TABLET 650 MG: at 08:01

## 2025-01-25 RX ADMIN — FAMOTIDINE 20 MG: 20 TABLET, FILM COATED ORAL at 09:01

## 2025-01-25 RX ADMIN — CLOPIDOGREL BISULFATE 75 MG: 75 TABLET ORAL at 09:01

## 2025-01-25 RX ADMIN — SERTRALINE 100 MG: 100 TABLET, FILM COATED ORAL at 09:01

## 2025-01-25 RX ADMIN — SACUBITRIL AND VALSARTAN 1 TABLET: 24; 26 TABLET, FILM COATED ORAL at 09:01

## 2025-01-25 RX ADMIN — CALCITRIOL 0.5 MCG: 0.5 CAPSULE, LIQUID FILLED ORAL at 09:01

## 2025-01-25 RX ADMIN — APIXABAN 5 MG: 5 TABLET, FILM COATED ORAL at 09:01

## 2025-01-25 RX ADMIN — FUROSEMIDE 40 MG: 40 TABLET ORAL at 09:01

## 2025-01-25 RX ADMIN — INSULIN GLARGINE 5 UNITS: 100 INJECTION, SOLUTION SUBCUTANEOUS at 08:01

## 2025-01-25 RX ADMIN — ATORVASTATIN CALCIUM 80 MG: 40 TABLET, FILM COATED ORAL at 09:01

## 2025-01-25 RX ADMIN — HEPARIN SODIUM 1000 UNITS: 1000 INJECTION, SOLUTION INTRAVENOUS; SUBCUTANEOUS at 06:01

## 2025-01-25 RX ADMIN — HYDRALAZINE HYDROCHLORIDE 10 MG: 10 TABLET ORAL at 08:01

## 2025-01-25 RX ADMIN — ALLOPURINOL 100 MG: 100 TABLET ORAL at 09:01

## 2025-01-25 RX ADMIN — HYDRALAZINE HYDROCHLORIDE 10 MG: 10 TABLET ORAL at 09:01

## 2025-01-25 RX ADMIN — APIXABAN 5 MG: 5 TABLET, FILM COATED ORAL at 08:01

## 2025-01-25 RX ADMIN — FUROSEMIDE 40 MG: 40 TABLET ORAL at 08:01
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
## 2025-01-25 NOTE — PROGRESS NOTES
Patient arrived to [facility] via stretcher, [id number].    1530 HD treatment started via L Subclavian catheter; site looks clean and dry and no complication noted; accessed with good blood flow, VSS and recorded, NADN.

## 2025-01-25 NOTE — PROGRESS NOTES
Pharmacist Renal Dose Adjustment Note    Suyapa Connelly is a 59 y.o. female being treated with famotidine 20 mg every 12 hours   Patient Data:    Vital Signs (Most Recent):  Temp: 98.3 °F (36.8 °C) (01/24/25 1613)  Pulse: 88 (01/24/25 1612)  Resp: 16 (01/24/25 1612)  BP: (!) 161/83 (01/24/25 1612)  SpO2: 95 % (01/24/25 1612) Vital Signs (72h Range):  Temp:  [98.3 °F (36.8 °C)]   Pulse:  [88]   Resp:  [16]   BP: (161)/(83)   SpO2:  [95 %]      Recent Labs   Lab 01/24/25 1939   CREATININE 4.8*     Serum creatinine: 4.8 mg/dL (H) 01/24/25 1939  Estimated creatinine clearance: 9.6 mL/min (A)    Adjusted to   Famotidine 20 mg every 24 hour    Pharmacist's Name: Claudette Saha  Pharmacist's Extension: [phone]

## 2025-01-25 NOTE — ASSESSMENT & PLAN NOTE
59 y.o. Black or  Female ESRD-HD M-W-F presents to ED on 1/24/2025 with diagnosis of: ESRD on dialysis [N18.6, Z99.2]   Nephrology consulted for inpatient ESRD-HD management    Outpatient HD Information:  -Dialysis modality: Hemodialysis  -[facility]patient HD unit: [facility]  -Nephrologist: ?  -HD TX days: Monday/Wednesday/Friday, duration of treatment: 3.5 hrs  -Last HD TX prior to hospital admission: 1/20/25  -Dialysis access: dialysis catheter   -Residual urine: Minium  -EDW: ?    Assessment:   - Will provide dialysis today (01/25/2025) with UF - 4L as BP tolerates for metabolic clearance and volume management. If no other indications for admission, would recommend dc after HD today with continual HD in the OP setting on Monday, 1/27.   - Labs reviewed and dialysate to be adjusted to current labs.   - Continue to monitor intake and output  - Please avoid gadolinium, fleets, phos-based laxatives, NSAIDs  - Dialysis thrice weekly unless more urgent indications arise. Will evaluate RRT requirements Daily.    Anemia of ESRD   Recent Labs   Lab 01/24/25  1939 01/25/25  1210   WBC 4.76 4.45   HGB 10.7* 9.9*   HCT 34.5* 33.9*    226     Lab Results   Component Value Date    FESATURATED 35 06/22/2024    FERRITIN 757 (H) 06/22/2024       - Goal in ESRD is Hgb of 10-11. Hgb 9.9. At target.   - EPO can be administered and dosed per his OP unit upon discharge.  - if patient is on iron infusions please D/C when active infection, cautiously use EPO when hx of malignancy, high BP (SBP usually > 170mmhg).    Mineral Bone Disease in ESRD   Lab Results   Component Value Date    .8 (H) 10/28/2024    CALCIUM 7.9 (L) 01/25/2025    ALBUMIN 2.8 (L) 01/25/2025    CAION 0.92 (L) 05/17/2023    PHOS 4.4 01/25/2025       - F/U PO4, Mg, Calcium. And albumin levels daily.   - Renal diet with protein intake goal 1.5 g/kg/d with 1 L fluid restriction   - If patient has poor oral intake, recommend nephro nutritional  silver (ex Novasource)  - Continue or restart home phos binder, phos 4.4  - PTH to be managed at her outpatient HD unit
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
## 2025-01-25 NOTE — SUBJECTIVE & OBJECTIVE
Past Medical History:   Diagnosis Date    Anxiety     Chronic pain syndrome     CKD (chronic kidney disease), stage III     CVD (cardiovascular disease)     Depression     Diabetes mellitus, type 2     GERD (gastroesophageal reflux disease)     Hyperemesis 03/23/2021    Hypokalemia 03/23/2021    Infection of below knee amputation stump 03/12/2022    Osteomyelitis     Osteomyelitis of left foot 04/30/2021    Ulcer of left foot     Vaginal delivery     x1       Past Surgical History:   Procedure Laterality Date    ABOVE-KNEE AMPUTATION Left 5/18/2021    Procedure: AMPUTATION, ABOVE KNEE;  Surgeon: Teddy Huber MD;  Location: [address];  Service: Vascular;  Laterality: Left;    ABOVE-KNEE AMPUTATION Right 3/18/2022    Procedure: AMPUTATION, ABOVE KNEE;  Surgeon: DAYNE Florez II, MD;  Location: [address];  Service: Vascular;  Laterality: Right;    Angiogram - Right Extremity Right 7/9/15    ANGIOGRAM, EXTREMITY, UNILATERAL Left 11/22/2024    Procedure: ANGIOGRAM, EXTREMITY, UNILATERAL;  Surgeon: Yony Lindsey MD;  Location: [address];  Service: Vascular;  Laterality: Left;  Left leg angio, L CFA approach  5.4 min  597.31 mGy  73.8959 Gycm2  20 ml dye    angiogram-left leg  10/6/15    ANGIOGRAPHY OF LOWER EXTREMITY Left 4/29/2021    Procedure: ANGIOGRAM, LOWER EXTREMITY;  Surgeon: Teddy Huber MD;  Location: [address];  Service: Vascular;  Laterality: Left;    BELOW KNEE AMPUTATION OF LOWER EXTREMITY Right 12/28/2021    Procedure: AMPUTATION, BELOW KNEE;  Surgeon: Kaitlyn Rojas MD;  Location: [facility] OR;  Service: General;  Laterality: Right;    CATHETERIZATION OF BOTH LEFT AND RIGHT HEART N/A 12/18/2019    Procedure: CATHETERIZATION, HEART, BOTH LEFT AND RIGHT;  Surgeon: Que Fernando III, MD;  Location: [facility] CATH LAB;  Service: Cardiology;  Laterality: N/A;    CORONARY ANGIOGRAPHY N/A 12/18/2019    Procedure: ANGIOGRAM, CORONARY ARTERY;  Surgeon:  Que Fernando III, MD;  Location: [facility] CATH LAB;  Service: Cardiology;  Laterality: N/A;    CORONARY ANGIOGRAPHY INCLUDING BYPASS GRAFTS WITH CATHETERIZATION OF LEFT HEART N/A 7/28/2020    Procedure: ANGIOGRAM, CORONARY, INCLUDING BYPASS GRAFT, WITH LEFT HEART CATHETERIZATION, 9 am;  Surgeon: Rachel Easley MD;  Location: [facility] CATH LAB;  Service: Cardiology;  Laterality: N/A;    CORONARY ARTERY BYPASS GRAFT (CABG) N/A 1/14/2020    Procedure: CORONARY ARTERY BYPASS GRAFT (CABG) x 1     Off Pump;  Surgeon: Huang Altamirano MD;  Location: [facility] OR [address] [place] [address];  Service: Cardiovascular;  Laterality: N/A;    CREATION OF FEMORAL-TIBIAL ARTERY BYPASS Left 4/29/2021    Procedure: CREATION, BYPASS, ARTERIAL, FEMORAL TO ANTERIOR TIBIAL;  Surgeon: Teddy Huber MD;  Location: [facility] OR [address] [place] [address];  Service: Vascular;  Laterality: Left;    CREATION OF FEMOROPOPLITEAL ARTERIAL BYPASS USING GRAFT Left 8/18/2020    Procedure: CREATION, BYPASS, ARTERIAL, FEMORAL TO POPLITEAL, USING GRAFT, LEFT LOWER EXTREMITY;  Surgeon: Teddy Huber MD;  Location: [facility];  Service: Vascular;  Laterality: Left;  REQUEST 7:15 A.M. START----COVID NEGATIVE ON 8/17 1ST CASE STARTKENYA DUEÑAS ON 8/7/2020 @ 942AM-LO  RN PREOP 8/12/2020   T/S-----CLEARED BY CARDS-------PENDING INSURANCE    DEBRIDEMENT OF FOOT Left 9/8/2020    Procedure: DEBRIDEMENT, FOOT;  Surgeon: Rosio Mayes DPM;  Location: [facility] OR;  Service: Podiatry;  Laterality: Left;  request neoxx .   RN Pre Op 9-4-2020, Covid negative on 9/5/20. C A    DEBRIDEMENT OF FOOT  3/4/2021    Procedure: DEBRIDEMENT, FOOT;  Surgeon: Teddy Huber MD;  Location: [facility] OR;  Service: Vascular;;    DEBRIDEMENT OF FOOT Left 3/9/2021    Procedure: DEBRIDEMENT, FOOT, bone biopsy;  Surgeon: Rosio Mayes DPM;  Location: [facility] OR;  Service: Podiatry;  Laterality: Left;  Request neoxx---COVID IN AM  REQUESTING NOON START  RN Phone Pre op.On Blood thinners Plavix and Eliquis.  Covid am of  surgery. C A    DEBRIDEMENT OF FOOT Left 5/4/2021    Procedure: DEBRIDEMENT, FOOT;  Surgeon: Farooq Morley DPM;  Location: [facility] OR [facility] FLR;  Service: Podiatry;  Laterality: Left;    ESOPHAGOGASTRODUODENOSCOPY N/A 11/7/2024    Procedure: EGD (ESOPHAGOGASTRODUODENOSCOPY);  Surgeon: Yony Cancino MD;  Location: [facility];  Service: Endoscopy;  Laterality: N/A;    INSERTION OF TUNNELED CENTRAL VENOUS HEMODIALYSIS CATHETER N/A 1/27/2020    Procedure: Insertion, Catheter, Central Venous, Hemodialysis;  Surgeon: ESTEBAN Gomez III, MD;  Location: [facility] CATH LAB;  Service: Peripheral Vascular;  Laterality: N/A;    INSERTION OF TUNNELED CENTRAL VENOUS HEMODIALYSIS CATHETER  5/10/2023    Procedure: Insertion, Catheter, Central Venous, Hemodialysis;  Surgeon: Romulo Queen MD;  Location: [facility] CATH LAB;  Service: Cardiology;;    IRRIGATION AND DEBRIDEMENT OF LOWER EXTREMITY Left 11/22/2024    Procedure: IRRIGATION AND DEBRIDEMENT, LOWER EXTREMITY;  Surgeon: Yony Lindsey MD;  [address];  Service: Vascular;  Laterality: Left;    MAGNETIC RESONANCE IMAGING Left 11/19/2024    Procedure: MRI (Magnetic Resonance Imagine);  Surgeon: Sophia Hernandez;  Location: [facility] SOPHIA;  Service: Anesthesiology;  Laterality: Left;    PERCUTANEOUS TRANSLUMINAL ANGIOPLASTY N/A 3/4/2021    Procedure: PTA (ANGIOPLASTY, PERCUTANEOUS, TRANSLUMINAL);  Surgeon: Teddy Huber MD;  Location: [facility] OR;  Service: Vascular;  Laterality: N/A;    REMOVAL OF ARTERIOVENOUS GRAFT Left 5/27/2021    Procedure: REMOVAL, GRAFT, LEFT LOWER EXTREMITY, WOUND EXPLORATION;  Surgeon: Teddy Huber MD;  [address];  Service: Vascular;  Laterality: Left;    REMOVAL OF NAIL OF DIGIT Left 3/9/2021    Procedure: REMOVAL, NAIL, DIGIT;  Surgeon: Rosio Mayes DPM;  Location: [facility] OR;  Service: Podiatry;  Laterality: Left;    RIGHT HEART CATHETERIZATION Right 5/10/2023    Procedure: INSERTION, CATHETER,  RIGHT HEART;  Surgeon: Romulo Queen MD;  Location: [facility] CATH LAB;  Service: Cardiology;  Laterality: Right;    STENT, ILIAC ARTERY Left 11/22/2024    Procedure: STENT, ILIAC ARTERY;  Surgeon: Yony Lindsey MD;  Location: [facility] OR 2ND FLR;  Service: Vascular;  Laterality: Left;    THROMBECTOMY Left 3/4/2021    Procedure: THROMBECTOMY, LEFT LOWER EXTREMITY BYPASS GRAFT, ANGIOGRAM, POSSIBLE INTERVENTION, POSSIBLE LEFT LOWER EXTREMITY BYPASS;  Surgeon: Teddy Huber MD;  Location: [facility] OR;  Service: Vascular;  Laterality: Left;    THROMBECTOMY Left 4/29/2021    Procedure: GRAFT THROMBECTOMY, LEFT LOWER EXTREMITY;  Surgeon: Teddy Huber MD;  [facility];  Service: Vascular;  Laterality: Left;  14.5 min  1179.85 mGy  341.01 Gycm2  240 ml dye    THROMBECTOMY  10/22/2021    Procedure: THROMBECTOMY;  Surgeon: Saad Arenas MD;  Location: [facility] CATH LAB/EP;  Service: Cardiology;;       Review of patient's allergies indicates:   Allergen Reactions    Ciprofloxacin Itching    Pcn [penicillins]      Rash; tolerated ceftriaxone on 1/13/20  Tolerating Augmentin November 2024     Current Facility-Administered Medications   Medication Frequency    acetaminophen tablet 1,000 mg Q8H PRN    albuterol inhaler 2 puff Q6H PRN    allopurinoL tablet 100 mg Every Other Day    aluminum-magnesium hydroxide-simethicone 200-200-20 mg/5 mL suspension 30 mL QID PRN    apixaban tablet 5 mg BID    atorvastatin tablet 80 mg Daily    calcitRIOL capsule 0.5 mcg Daily    carvediloL tablet 6.25 mg BID WM    [START ON 1/27/2025] cinacalcet tablet 30 mg Every Mon, Wed, Fri    clopidogreL tablet 75 mg Daily    dextrose 50% injection 12.5 g PRN    dextrose 50% injection 25 g PRN    famotidine tablet 20 mg Daily    furosemide tablet 40 mg BID    glucagon (human recombinant) injection 1 mg PRN    glucose chewable tablet 16 g PRN    glucose chewable tablet 24 g PRN    hydrALAZINE tablet 10 mg Q12H    insulin  "aspart U-100 pen 0-10 Units QID (AC + HS) PRN    insulin glargine U-100 (Lantus) pen 5 Units QHS    isosorbide dinitrate tablet 5 mg BID    melatonin tablet 6 mg Nightly PRN    naloxone 0.4 mg/mL injection 0.02 mg PRN    ondansetron injection 4 mg Q8H PRN    oxyCODONE immediate release tablet Tab 10 mg Q6H PRN    polyethylene glycol packet 17 g Daily    sacubitriL-valsartan 24-26 mg per tablet 1 tablet BID    sertraline tablet 100 mg Daily    sevelamer carbonate 2.4 gram powder 2.4 g Daily    sodium bicarbonate tablet 650 mg TID    sodium chloride 0.9% flush 10 mL PRN    sodium chloride 0.9% flush 10 mL Q12H PRN    traZODone tablet 100 mg Nightly PRN     Current Outpatient Medications   Medication    acetaminophen (TYLENOL) 325 MG tablet    allopurinoL (ZYLOPRIM) 100 MG tablet    apixaban (ELIQUIS) 5 mg Tab    atorvastatin (LIPITOR) 80 MG tablet    blood sugar diagnostic Strp    blood-glucose meter Misc    blood-glucose sensor (DEXCOM G7 SENSOR) Radha    calcitRIOL (ROCALTROL) 0.5 MCG Cap    carvediloL (COREG) 6.25 MG tablet    cinacalcet (SENSIPAR) 30 MG Tab    clopidogreL (PLAVIX) 75 mg tablet    famotidine (PEPCID) 20 MG tablet    furosemide (LASIX) 40 MG tablet    hydrALAZINE (APRESOLINE) 10 MG tablet    insulin aspart U-100 (NOVOLOG) 100 unit/mL (3 mL) InPn pen    insulin glargine U-100, Lantus, 100 unit/mL (3 mL) SubQ InPn pen    isosorbide dinitrate (ISORDIL) 10 MG tablet    lancets 33 gauge Misc    multivit with min-folic acid (ONE DAILY WOMENS 50 PLUS) 0.4 mg Tab    mupirocin (BACTROBAN) 2 % ointment    pen needle, diabetic 33 gauge x 5/32" Ndle    pregabalin (LYRICA) 75 MG capsule    RENVELA 2.4 gram PwPk    sacubitriL-valsartan (ENTRESTO) 24-26 mg per tablet    semaglutide (OZEMPIC) 0.25 mg or 0.5 mg (2 mg/3 mL) pen injector    sertraline (ZOLOFT) 100 MG tablet    sodium bicarbonate 650 MG tablet    traZODone (DESYREL) 50 MG tablet    zinc oxide 10 % Oint     Family History       Problem Relation (Age of " Onset)    Diabetes Mother, Father, Paternal Grandmother    Heart disease Maternal Grandmother    No Known Problems Maternal Grandfather, Paternal Grandfather          Tobacco Use    Smoking status: Former     Passive exposure: Never    Smokeless tobacco: Never   Substance and Sexual Activity    Alcohol use: No    Drug use: Yes     Types: Marijuana     Comment: occassional    Sexual activity: Yes     Partners: Male     Review of Systems   Constitutional:  Positive for fatigue. Negative for appetite change, chills, diaphoresis and fever.   HENT:  Positive for facial swelling. Negative for sore throat and trouble swallowing.    Eyes:  Negative for photophobia and visual disturbance.   Respiratory:  Positive for cough. Negative for shortness of breath and wheezing.    Cardiovascular:  Positive for leg swelling. Negative for chest pain and palpitations.   Gastrointestinal:  Positive for abdominal distention. Negative for abdominal pain, diarrhea, nausea and vomiting.   Genitourinary:  Negative for dysuria and hematuria.   Musculoskeletal:  Negative for neck pain and neck stiffness.   Skin:  Negative for rash and wound.   Neurological:  Positive for weakness. Negative for seizures, syncope, numbness and headaches.   Psychiatric/Behavioral:  Negative for confusion and decreased concentration.      Objective:     Vital Signs (Most Recent):  Temp: 97.9 °F (36.6 °C) (01/25/25 1121)  Pulse: 86 (01/25/25 1121)  Resp: 19 (01/25/25 1121)  BP: (!) 145/65 (01/25/25 1121)  SpO2: 98 % (01/25/25 1121) Vital Signs (24h Range):  Temp:  [97.9 °F (36.6 °C)-98.3 °F (36.8 °C)] 97.9 °F (36.6 °C)  Pulse:  [79-90] 86  Resp:  [16-20] 19  SpO2:  [95 %-98 %] 98 %  BP: (142-175)/(65-83) 145/65     Weight: 70.8 kg (156 lb) (01/24/25 1612)  Body mass index is 47.6 kg/m².  Body surface area is 1.55 meters squared.    No intake/output data recorded.     Physical Exam  Vitals and nursing note reviewed.   Constitutional:       General: She is not in  acute distress.     Appearance: She is well-developed.   HENT:      Head: Normocephalic and atraumatic.      Right Ear: Hearing and external ear normal.      Left Ear: Hearing and external ear normal.      Mouth/Throat:      Mouth: Mucous membranes are moist.   Eyes:      General: No scleral icterus.     Conjunctiva/sclera: Conjunctivae normal.   Cardiovascular:      Rate and Rhythm: Normal rate.   Pulmonary:      Effort: Pulmonary effort is normal. No respiratory distress.   Abdominal:      General: Bowel sounds are normal. There is distension.      Palpations: Abdomen is soft.   Musculoskeletal:         General: Swelling present. Normal range of motion.      Cervical back: Normal range of motion.      Right lower leg: Edema present.      Left lower leg: Edema present.   Skin:     General: Skin is warm and dry.   Neurological:      General: No focal deficit present.      Mental Status: She is alert and oriented to person, place, and time. Mental status is at baseline.   Psychiatric:         Mood and Affect: Mood normal.         Behavior: Behavior normal. Behavior is cooperative.          Significant Labs:  CBC:   Recent Labs   Lab 01/25/25  1210   WBC 4.45   RBC 3.27*   HGB 9.9*   HCT 33.9*      *   MCH 30.3   MCHC 29.2*     CMP:   Recent Labs   Lab 01/25/25  1210   *   CALCIUM 7.9*   ALBUMIN 2.8*   PROT 7.4   *   K 4.1   CO2 17*      BUN 64*   CREATININE 5.2*   ALKPHOS 166*   ALT 9*   AST 16   BILITOT 0.3     All labs within the past 24 hours have been reviewed.

## 2025-01-25 NOTE — ED NOTES
Assumed care of patient at this time. Hospital bed side rails up x3, locked in lowest position, call bell is within reach. Pt instructed to call staff for mobility. Family remains at bedside. Pt denies any further needs at this time, in NAD, AAOx4.

## 2025-01-25 NOTE — ED NOTES
Report received and care assumed at this time. Pt awake and alert; resting quietly on stretcher.  Pt denies pain at this time; no acute distress or discomfort reported or observed.  Pt denies restroom needs at this time; is able to reposition self on stretcher. Bed locked in lowest position; side rails up and locked x 2; call light, bedside table, and personal belongings within reach.  at bedside.  Room assessed for safety measures and cleanliness; no action needed at this time. Plan of care discussed.  Pt instructed to alert nurse for assistance and before attempting to get out of bed; verbalizes understanding. Pt denies needs or complaints at this time; monitoring ongoing.

## 2025-01-25 NOTE — HOSPITAL COURSE
Suyapa Connelly is a 59 y.o. admitted to observation with hospital medicine for urgent HD. Nephrology consulted, patient to undergo HD today. Patient tolerated HD without issue. Patient stable to discharge and continue with normal HD schedule of [facility]. Discussed with nephrology who are agreeable to discharge.    Pt was seen and evaluated by me this morning, reports feeling well, and is eager to discharge home. All questions were answered. Patient acknowledged understanding of discharge instructions and feels safe to discharge home. Patient was discharged on 1/26/2025 in stable condition with PCP follow-up. Education regarding condition provided and return precautions given.     Physical Exam  Gen: in NAD, appears stated age  Neuro: AAOx3, motor, sensory, and strength grossly intact BL  HEENT: EOMI, PERRLA; no JVD appreciated  CVS: RRR, no m/r/g  Resp: lungs CTAB, no w/r/r; no belabored breathing or accessory muscle use appreciated   Abd: NTND, soft to palpation  Extrem: Bilateral BKA

## 2025-01-25 NOTE — PHARMACY MED REC
"Admission Medication History     The home medication history was taken by Shahnaz Greenfield.    You may go to "Admission" then "Reconcile Home Medications" tabs to review and/or act upon these items.     The home medication list has been updated by the Pharmacy department.   Please read ALL comments highlighted in yellow.   Please address this information as you see fit.    Feel free to contact us if you have any questions or require assistance.      The medications listed below were removed from the home medication list. Please reorder if appropriate:  Patient reports no longer taking the following medication(s):  EPOETIN SHABANA 4,000 U/ML INJ  HYDROXYZINE 25 MG    Medications listed below were obtained from: [place] - spouse and [employer]- [employer]  Current Outpatient Medications on File Prior to Encounter   Medication Sig    acetaminophen (TYLENOL) 325 MG tablet Take 2 tablets (650 mg total) by mouth every 8 (eight) hours as needed for pain.    allopurinoL (ZYLOPRIM) 100 MG tablet Take 1/2 tablet(50 mg) by mouth every other day.    apixaban (ELIQUIS) 5 mg Tab Take 1 tablet (5 mg total) by mouth 2 (two) times daily.    atorvastatin (LIPITOR) 80 MG tablet Take 1 tablet (80 mg total) by mouth once daily.    blood sugar diagnostic Strp 1 each by Misc.(Non-Drug; Combo Route) route 4 (four) times daily before meals and nightly.    blood-glucose meter Misc Before meals and at bedtime    blood-glucose sensor (DEXCOM G7 SENSOR) Radha 1 each by Misc.(Non-Drug; Combo Route) route once daily.    calcitRIOL (ROCALTROL) 0.5 MCG Cap Take 1 capsule (0.5 mcg total) by mouth once daily.    carvediloL (COREG) 6.25 MG tablet Take 1 tablet(6.25 mg) by mouth twice daily.    cinacalcet (SENSIPAR) 30 MG Tab Take 1 tablet (30 mg total) by mouth every Mon, Wed, Fri.    clopidogreL (PLAVIX) 75 mg tablet Take 1 tablet (75 mg total) by mouth once daily.    famotidine (PEPCID) 20 MG tablet Take 1 tablet(20 mg) by mouth twice daily.    furosemide " "(LASIX) 40 MG tablet Take 40 mg by mouth 2 (two) times daily.    hydrALAZINE (APRESOLINE) 10 MG tablet Take 1 tablet (10 mg total) by mouth every 12 (twelve) hours.    insulin aspart U-100 (NOVOLOG) 100 unit/mL (3 mL) InPn pen Inject 0-10 Units into the skin before meals and at bedtime as needed (Hyperglycemia). **MODERATE CORRECTION DOSE**  Blood Glucose  mg/dL                  Pre-meal                2200  151-200                2 units                    1 unit  201-250                4 units                    2 units  251-300                6 units                    3 units  301-350                8 units                    4 units  >350                     10 units                  5 units  Inject 5 Units into the skin before meals and at bedtime as needed (Hyperglycemia). **MODERATE CORRECTION DOSE**  Blood Glucose  mg/dL                  Pre-meal                2200  151-200                2 units                    1 unit  201-250                4 units                    2 units  251-300                6 units                    3 units  301-350                8 units                    4 units  >350                     10 units                  5 units  Administer subcutaneously if needed at times designated by monitoring  schedule.)    insulin glargine U-100, Lantus, 100 unit/mL (3 mL) SubQ InPn pen Inject 8 Units into the skin every evening.    isosorbide dinitrate (ISORDIL) 10 MG tablet Take 0.5 tablets (5 mg total) by mouth 2 (two) times daily.    lancets 33 gauge Misc Test 3 times daily    multivit with min-folic acid (ONE DAILY WOMENS 50 PLUS) 0.4 mg Tab Take 1 tablet by mouth once daily.    mupirocin (BACTROBAN) 2 % ointment Apply topically 2 (two) times daily.    pen needle, diabetic 33 gauge x 5/32" Ndle 1 each by Misc.(Non-Drug; Combo Route) route 4 (four) times daily before meals and nightly.    pregabalin (LYRICA) 75 MG capsule Take 1 capsule (75 mg total) by mouth daily.    RENVELA 2.4 gram " PwPk Take 1 packet by mouth 3 (three) times daily with meals.    sacubitriL-valsartan (ENTRESTO) 24-26 mg per tablet Take 1 tablet by mouth 2 (two) times daily.    semaglutide (OZEMPIC) 0.25 mg or 0.5 mg (2 mg/3 mL) pen injector Inject 0.5 mg into the skin every 7 days.    sertraline (ZOLOFT) 100 MG tablet Take 1 tablet (100 mg total) by mouth every evening.    sodium bicarbonate 650 MG tablet Take 1 tablet (650 MG) by mouth 3 (three) times daily.    traZODone (DESYREL) 50 MG tablet Take 2 tablets (100 mg total) by mouth nightly as needed for Insomnia.    zinc oxide 10 % Oint Apply 1 Application topically 2 (two) times a day.                 Shahnaz Greenfield  EXT [phone]                  .
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
## 2025-01-25 NOTE — PROGRESS NOTES
[facility] H[facility]ODIAL[facility] NOTE     Patient currently on hemodialysis for removal of uremic toxins .     Patient seen and evaluated on hemodialysis, tolerating treatment, see HD flowsheet for vitals and assessments.      No Hypotension, chest pain, shortness of breath, cramping, nausea or vomiting.     Following patient for the management of ESRD    Target UF: 3.5-4 L as tolerated, keep MAP >65. -140s  m Hg.  HD initiated with no issues; see full consult note for further recommendations.   If no other indications for admission, would recommend dc after HD today with continual HD in the OP setting on Monday, 1/27.     KATHLEEN Faulkner DNP, APRN, FNP-C  Department of Nephrology  [facility]  Pager: [phone]

## 2025-01-25 NOTE — SUBJECTIVE & OBJECTIVE
Past Medical History:   Diagnosis Date    Anxiety     Chronic pain syndrome     CKD (chronic kidney disease), stage III     CVD (cardiovascular disease)     Depression     Diabetes mellitus, type 2     GERD (gastroesophageal reflux disease)     Hyperemesis 03/23/2021    Hypokalemia 03/23/2021    Infection of below knee amputation stump 03/12/2022    Osteomyelitis     Osteomyelitis of left foot 04/30/2021    Ulcer of left foot     Vaginal delivery     x1       Past Surgical History:   Procedure Laterality Date    ABOVE-KNEE AMPUTATION Left 5/18/2021    Procedure: AMPUTATION, ABOVE KNEE;  Surgeon: Teddy Huber MD;  Location: [address];  Service: Vascular;  Laterality: Left;    ABOVE-KNEE AMPUTATION Right 3/18/2022    Procedure: AMPUTATION, ABOVE KNEE;  Surgeon: DAYNE Florez II, MD;  Location: [address];  Service: Vascular;  Laterality: Right;    Angiogram - Right Extremity Right 7/9/15    ANGIOGRAM, EXTREMITY, UNILATERAL Left 11/22/2024    Procedure: ANGIOGRAM, EXTREMITY, UNILATERAL;  Surgeon: Yony Lindsey MD;  Location: [address];  Service: Vascular;  Laterality: Left;  Left leg angio, L CFA approach  5.4 min  597.31 mGy  73.8959 Gycm2  20 ml dye    angiogram-left leg  10/6/15    ANGIOGRAPHY OF LOWER EXTREMITY Left 4/29/2021    Procedure: ANGIOGRAM, LOWER EXTREMITY;  Surgeon: Teddy Huber MD;  Location: [address];  Service: Vascular;  Laterality: Left;    BELOW KNEE AMPUTATION OF LOWER EXTREMITY Right 12/28/2021    Procedure: AMPUTATION, BELOW KNEE;  Surgeon: Kaitlyn Rojas MD;  Location: [facility] OR;  Service: General;  Laterality: Right;    CATHETERIZATION OF BOTH LEFT AND RIGHT HEART N/A 12/18/2019    Procedure: CATHETERIZATION, HEART, BOTH LEFT AND RIGHT;  Surgeon: Que Fernando III, MD;  Location: [facility] CATH LAB;  Service: Cardiology;  Laterality: N/A;    CORONARY ANGIOGRAPHY N/A 12/18/2019    Procedure: ANGIOGRAM, CORONARY ARTERY;  Surgeon:  Que Fernando III, MD;  Location: [facility] CATH LAB;  Service: Cardiology;  Laterality: N/A;    CORONARY ANGIOGRAPHY INCLUDING BYPASS GRAFTS WITH CATHETERIZATION OF LEFT HEART N/A 7/28/2020    Procedure: ANGIOGRAM, CORONARY, INCLUDING BYPASS GRAFT, WITH LEFT HEART CATHETERIZATION, 9 am;  Surgeon: Rachel Easley MD;  Location: [facility] CATH LAB;  Service: Cardiology;  Laterality: N/A;    CORONARY ARTERY BYPASS GRAFT (CABG) N/A 1/14/2020    Procedure: CORONARY ARTERY BYPASS GRAFT (CABG) x 1     Off Pump;  Surgeon: Huang Altamirano MD;  Location: [facility] OR [address] [place] [address];  Service: Cardiovascular;  Laterality: N/A;    CREATION OF FEMORAL-TIBIAL ARTERY BYPASS Left 4/29/2021    Procedure: CREATION, BYPASS, ARTERIAL, FEMORAL TO ANTERIOR TIBIAL;  Surgeon: Teddy Huber MD;  Location: [facility] OR [address] [place] [address];  Service: Vascular;  Laterality: Left;    CREATION OF FEMOROPOPLITEAL ARTERIAL BYPASS USING GRAFT Left 8/18/2020    Procedure: CREATION, BYPASS, ARTERIAL, FEMORAL TO POPLITEAL, USING GRAFT, LEFT LOWER EXTREMITY;  Surgeon: Teddy Huber MD;  Location: [facility];  Service: Vascular;  Laterality: Left;  REQUEST 7:15 A.M. START----COVID NEGATIVE ON 8/17 1ST CASE STARTKENYA DUEÑAS ON 8/7/2020 @ 942AM-LO  RN PREOP 8/12/2020   T/S-----CLEARED BY CARDS-------PENDING INSURANCE    DEBRIDEMENT OF FOOT Left 9/8/2020    Procedure: DEBRIDEMENT, FOOT;  Surgeon: Rosio Mayes DPM;  Location: [facility] OR;  Service: Podiatry;  Laterality: Left;  request neoxx .   RN Pre Op 9-4-2020, Covid negative on 9/5/20. C A    DEBRIDEMENT OF FOOT  3/4/2021    Procedure: DEBRIDEMENT, FOOT;  Surgeon: Teddy Huber MD;  Location: [facility] OR;  Service: Vascular;;    DEBRIDEMENT OF FOOT Left 3/9/2021    Procedure: DEBRIDEMENT, FOOT, bone biopsy;  Surgeon: Rosio Mayes DPM;  Location: [facility] OR;  Service: Podiatry;  Laterality: Left;  Request neoxx---COVID IN AM  REQUESTING NOON START  RN Phone Pre op.On Blood thinners Plavix and Eliquis.  Covid am of  surgery. C A    DEBRIDEMENT OF FOOT Left 5/4/2021    Procedure: DEBRIDEMENT, FOOT;  Surgeon: Farooq Morley DPM;  Location: [facility] OR [facility] FLR;  Service: Podiatry;  Laterality: Left;    ESOPHAGOGASTRODUODENOSCOPY N/A 11/7/2024    Procedure: EGD (ESOPHAGOGASTRODUODENOSCOPY);  Surgeon: Yony Cancino MD;  Location: [facility];  Service: Endoscopy;  Laterality: N/A;    INSERTION OF TUNNELED CENTRAL VENOUS HEMODIALYSIS CATHETER N/A 1/27/2020    Procedure: Insertion, Catheter, Central Venous, Hemodialysis;  Surgeon: ESTEBAN Gomez III, MD;  Location: [facility] CATH LAB;  Service: Peripheral Vascular;  Laterality: N/A;    INSERTION OF TUNNELED CENTRAL VENOUS HEMODIALYSIS CATHETER  5/10/2023    Procedure: Insertion, Catheter, Central Venous, Hemodialysis;  Surgeon: Romulo Queen MD;  Location: [facility] CATH LAB;  Service: Cardiology;;    IRRIGATION AND DEBRIDEMENT OF LOWER EXTREMITY Left 11/22/2024    Procedure: IRRIGATION AND DEBRIDEMENT, LOWER EXTREMITY;  Surgeon: Yony Lindsey MD;  [address];  Service: Vascular;  Laterality: Left;    MAGNETIC RESONANCE IMAGING Left 11/19/2024    Procedure: MRI (Magnetic Resonance Imagine);  Surgeon: Sophia Hernandez;  Location: [facility] SOPHIA;  Service: Anesthesiology;  Laterality: Left;    PERCUTANEOUS TRANSLUMINAL ANGIOPLASTY N/A 3/4/2021    Procedure: PTA (ANGIOPLASTY, PERCUTANEOUS, TRANSLUMINAL);  Surgeon: Teddy Huber MD;  Location: [facility] OR;  Service: Vascular;  Laterality: N/A;    REMOVAL OF ARTERIOVENOUS GRAFT Left 5/27/2021    Procedure: REMOVAL, GRAFT, LEFT LOWER EXTREMITY, WOUND EXPLORATION;  Surgeon: Teddy Huber MD;  [address];  Service: Vascular;  Laterality: Left;    REMOVAL OF NAIL OF DIGIT Left 3/9/2021    Procedure: REMOVAL, NAIL, DIGIT;  Surgeon: Rosio Mayes DPM;  Location: [facility] OR;  Service: Podiatry;  Laterality: Left;    RIGHT HEART CATHETERIZATION Right 5/10/2023    Procedure: INSERTION, CATHETER,  RIGHT HEART;  Surgeon: Romulo Queen MD;  Location: [facility] CATH LAB;  Service: Cardiology;  Laterality: Right;    STENT, ILIAC ARTERY Left 11/22/2024    Procedure: STENT, ILIAC ARTERY;  Surgeon: Yony Lindsey MD;  Location: [facility] OR [facility] FLR;  Service: Vascular;  Laterality: Left;    THROMBECTOMY Left 3/4/2021    Procedure: THROMBECTOMY, LEFT LOWER EXTREMITY BYPASS GRAFT, ANGIOGRAM, POSSIBLE INTERVENTION, POSSIBLE LEFT LOWER EXTREMITY BYPASS;  Surgeon: Teddy Huber MD;  Location: [facility] OR;  Service: Vascular;  Laterality: Left;    THROMBECTOMY Left 4/29/2021    Procedure: GRAFT THROMBECTOMY, LEFT LOWER EXTREMITY;  Surgeon: Teddy Huber MD;  [facility];  Service: Vascular;  Laterality: Left;  14.5 min  1179.85 mGy  341.01 Gycm2  240 ml dye    THROMBECTOMY  10/22/2021    Procedure: THROMBECTOMY;  Surgeon: Saad Arenas MD;  Location: [facility] CATH LAB/EP;  Service: Cardiology;;       Review of patient's allergies indicates:   Allergen Reactions    Ciprofloxacin Itching    Pcn [penicillins]      Rash; tolerated ceftriaxone on 1/13/20  Tolerating Augmentin November 2024       No current facility-administered medications on file prior to encounter.     Current Outpatient Medications on File Prior to Encounter   Medication Sig    acetaminophen (TYLENOL) 325 MG tablet Take 2 tablets (650 mg total) by mouth every 8 (eight) hours as needed for Pain.    allopurinoL (ZYLOPRIM) 100 MG tablet TAKE 1/2 TABLET(50 MG) BY MOUTH EVERY OTHER DAY    apixaban (ELIQUIS) 5 mg Tab Take 1 tablet (5 mg total) by mouth 2 (two) times daily.    atorvastatin (LIPITOR) 80 MG tablet Take 1 tablet (80 mg total) by mouth once daily.    blood sugar diagnostic Strp 1 each by Misc.(Non-Drug; Combo Route) route 4 (four) times daily before meals and nightly.    blood-glucose meter Misc Before meals and at bedtime    blood-glucose sensor (DEXCOM G7 SENSOR) Radha 1 each by Misc.(Non-Drug; Combo Route) route once  daily.    blood-glucose sensor (DEXCOM G7 SENSOR) Radha 1 each by Misc.(Non-Drug; Combo Route) route once daily.    calcitRIOL (ROCALTROL) 0.5 MCG Cap Take 1 capsule (0.5 mcg total) by mouth once daily.    carvediloL (COREG) 6.25 MG tablet TAKE 1 TABLET(6.25 MG) BY MOUTH TWICE DAILY    cinacalcet (SENSIPAR) 30 MG Tab Take 1 tablet (30 mg total) by mouth every Mon, Wed, Fri.    clopidogreL (PLAVIX) 75 mg tablet Take 1 tablet (75 mg total) by mouth once daily.    epoetin nina (PROCRIT) 4,000 unit/mL injection Inject 1 mL (4,000 Units total) into the skin every Mon, Wed, Fri.    famotidine (PEPCID) 20 MG tablet TAKE 1 TABLET(20 MG) BY MOUTH TWICE DAILY    furosemide (LASIX) 40 MG tablet Take 40 mg by mouth 2 (two) times daily.    hydrALAZINE (APRESOLINE) 10 MG tablet Take 1 tablet (10 mg total) by mouth every 12 (twelve) hours.    hydrOXYzine HCL (ATARAX) 25 MG tablet Take 1 tablet by mouth.    insulin aspart U-100 (NOVOLOG) 100 unit/mL (3 mL) InPn pen Inject 0-10 Units into the skin before meals and at bedtime as needed (Hyperglycemia). **MODERATE CORRECTION DOSE**  Blood Glucose  mg/dL                  Pre-meal                2200  151-200                2 units                    1 unit  201-250                4 units                    2 units  251-300                6 units                    3 units  301-350                8 units                    4 units  >350                     10 units                  5 units  Administer subcutaneously if needed at times designated by monitoring  schedule.    insulin glargine U-100, Lantus, 100 unit/mL (3 mL) SubQ InPn pen Inject 5 Units into the skin every evening.    isosorbide dinitrate (ISORDIL) 10 MG tablet Take 0.5 tablets (5 mg total) by mouth 2 (two) times daily.    lancets 33 gauge Misc TEST 3 TIMES DAILY    multivit with min-folic acid (ONE DAILY WOMENS 50 PLUS) 0.4 mg Tab Take 1 tablet by mouth.    mupirocin (BACTROBAN) 2 % ointment Apply topically 2 (two) times  "daily.    pen needle, diabetic 33 gauge x 5/32" Ndle 1 each by Misc.(Non-Drug; Combo Route) route 4 (four) times daily before meals and nightly.    pregabalin (LYRICA) 75 MG capsule Take 1 capsule (75 mg total) by mouth Daily.    RENVELA 2.4 gram PwPk Take 1 packet by mouth.    sacubitriL-valsartan (ENTRESTO) 24-26 mg per tablet Take 1 tablet by mouth 2 (two) times daily.    semaglutide (OZEMPIC) 0.25 mg or 0.5 mg (2 mg/3 mL) pen injector Inject 0.5 mg into the skin every 7 days.    sertraline (ZOLOFT) 100 MG tablet Take 1 tablet (100 mg total) by mouth once daily.    sodium bicarbonate 650 MG tablet TAKE 1 TABLET(650 MG) BY MOUTH THREE TIMES DAILY    traZODone (DESYREL) 50 MG tablet Take 2 tablets (100 mg total) by mouth nightly as needed for Insomnia.    vitamin renal formula, B-complex-vitamin c-folic acid, (NEPHROCAP) 1 mg Cap Take 1 capsule by mouth once daily.    zinc oxide 10 % Oint Apply 1 Application topically 2 (two) times a day.    [DISCONTINUED] lancing device Misc 1 Device by Misc.(Non-Drug; Combo Route) route 2 (two) times daily with meals.     Family History       Problem Relation (Age of Onset)    Diabetes Mother, Father, Paternal Grandmother    Heart disease Maternal Grandmother    No Known Problems Maternal Grandfather, Paternal Grandfather          Tobacco Use    Smoking status: Former     Passive exposure: Never    Smokeless tobacco: Never   Substance and Sexual Activity    Alcohol use: No    Drug use: Yes     Types: Marijuana     Comment: occassional    Sexual activity: Yes     Partners: Male     Review of Systems   Constitutional:  Positive for fatigue. Negative for appetite change, chills, diaphoresis and fever.   HENT:  Positive for facial swelling. Negative for sore throat and trouble swallowing.    Eyes:  Negative for photophobia and visual disturbance.   Respiratory:  Positive for cough and shortness of breath. Negative for wheezing.    Cardiovascular:  Positive for leg swelling. Negative " for chest pain and palpitations.   Gastrointestinal:  Positive for abdominal distention. Negative for abdominal pain, diarrhea, nausea and vomiting.   Genitourinary:  Negative for dysuria and hematuria.   Musculoskeletal:  Negative for neck pain and neck stiffness.   Skin:  Negative for rash and wound.   Neurological:  Positive for weakness. Negative for seizures, syncope, numbness and headaches.   Psychiatric/Behavioral:  Negative for confusion and decreased concentration.      Objective:     Vital Signs (Most Recent):  Temp: 98.3 °F (36.8 °C) (01/24/25 1613)  Pulse: 88 (01/24/25 1612)  Resp: 16 (01/24/25 1612)  BP: (!) 161/83 (01/24/25 1612)  SpO2: 95 % (01/24/25 1612) Vital Signs (24h Range):  Temp:  [98.3 °F (36.8 °C)] 98.3 °F (36.8 °C)  Pulse:  [88] 88  Resp:  [16] 16  SpO2:  [95 %] 95 %  BP: (161)/(83) 161/83     Weight: 70.8 kg (156 lb)  Body mass index is 47.6 kg/m².     Physical Exam           Significant Labs: All pertinent labs within the past 24 hours have been reviewed.  CBC:   Recent Labs   Lab 01/24/25 1939   WBC 4.76   HGB 10.7*   HCT 34.5*        CMP:   Recent Labs   Lab 01/24/25 1939   *   K 4.5      CO2 16*   *   BUN 64*   CREATININE 4.8*   CALCIUM 8.3*   PROT 8.5*   ALBUMIN 3.1*   BILITOT 0.4   ALKPHOS 177*   AST 26   ALT 12   ANIONGAP 15       Significant Imaging: I have reviewed all pertinent imaging results/findings within the past 24 hours.

## 2025-01-25 NOTE — ED NOTES
Requested patients isosorbide dinitrate tablet 5 mg , sevelamer carbonate 2.4 gram powder 2.4 g , and sacubitriL-valsartan 24-26 mg per tablet 1 tablet from pharmacy for patients 0900 dose.
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
## 2025-01-26 NOTE — PROGRESS NOTES
3hrs HD tx completed with 3.5L of fluid removed.    Patient tolerated well.    Blood returned; lines flushed with NS; catheter locked with heparin, clamped ; capped and wrapped with sterile gauze.   @[id number].    HD catheter dressing changed.    Report called to DEANGELO Lazcano.    Patient departed [facility] via stretcher by RN.

## 2025-01-27 ENCOUNTER — PATIENT OUTREACH (OUTPATIENT)
Dept: ADMINISTRATIVE | Facility: HOSPITAL | Age: 60
End: 2025-01-27
Payer: MEDICARE

## 2025-01-27 ENCOUNTER — PATIENT OUTREACH (OUTPATIENT)
Dept: ADMINISTRATIVE | Facility: CLINIC | Age: 60
End: 2025-01-27
Payer: MEDICARE

## 2025-01-27 NOTE — PROGRESS NOTES
No outreaches to be attempted for this encounter only as the patient is non-applicable for a TCC post-discharge follow up call due to being admitted to ED observation.

## 2025-01-28 ENCOUNTER — EXTERNAL HOME HEALTH (OUTPATIENT)
Dept: HOME HEALTH SERVICES | Facility: HOSPITAL | Age: 60
End: 2025-01-28
Payer: MEDICARE

## 2025-01-28 DIAGNOSIS — L89.150 PRESSURE INJURY OF SACRAL REGION, UNSTAGEABLE: Primary | ICD-10-CM

## 2025-01-30 DIAGNOSIS — Z00.00 ENCOUNTER FOR MEDICARE ANNUAL WELLNESS EXAM: ICD-10-CM

## 2025-01-31 ENCOUNTER — TELEPHONE (OUTPATIENT)
Dept: VASCULAR SURGERY | Facility: CLINIC | Age: 60
End: 2025-01-31
Payer: MEDICARE

## 2025-02-05 ENCOUNTER — OFFICE VISIT (OUTPATIENT)
Dept: CARDIOLOGY | Facility: CLINIC | Age: 60
End: 2025-02-05
Payer: MEDICARE

## 2025-02-05 VITALS
HEIGHT: 55 IN | OXYGEN SATURATION: 96 % | WEIGHT: 104.94 LBS | DIASTOLIC BLOOD PRESSURE: 42 MMHG | HEART RATE: 78 BPM | BODY MASS INDEX: 24.29 KG/M2 | SYSTOLIC BLOOD PRESSURE: 84 MMHG

## 2025-02-05 DIAGNOSIS — I50.42 CHRONIC COMBINED SYSTOLIC AND DIASTOLIC CONGESTIVE HEART FAILURE: Primary | ICD-10-CM

## 2025-02-05 DIAGNOSIS — N18.6 TYPE 2 DIABETES MELLITUS WITH CHRONIC KIDNEY DISEASE ON CHRONIC DIALYSIS, WITH LONG-TERM CURRENT USE OF INSULIN: ICD-10-CM

## 2025-02-05 DIAGNOSIS — Z95.828 S/P FEMORAL-POPLITEAL BYPASS SURGERY: ICD-10-CM

## 2025-02-05 DIAGNOSIS — Z95.1 S/P CABG X 1: ICD-10-CM

## 2025-02-05 DIAGNOSIS — E66.01 MORBID OBESITY WITH BMI OF 50.0-59.9, ADULT: ICD-10-CM

## 2025-02-05 DIAGNOSIS — I25.10 CORONARY ARTERY DISEASE, UNSPECIFIED VESSEL OR LESION TYPE, UNSPECIFIED WHETHER ANGINA PRESENT, UNSPECIFIED WHETHER NATIVE OR TRANSPLANTED HEART: ICD-10-CM

## 2025-02-05 DIAGNOSIS — E11.22 TYPE 2 DIABETES MELLITUS WITH CHRONIC KIDNEY DISEASE ON CHRONIC DIALYSIS, WITH LONG-TERM CURRENT USE OF INSULIN: ICD-10-CM

## 2025-02-05 DIAGNOSIS — M89.9 CHRONIC KIDNEY DISEASE-MINERAL BONE DISORDER (CKD-MBD) WITH STAGE 5 CHRONIC KIDNEY DISEASE, ON CHRONIC DIALYSIS: ICD-10-CM

## 2025-02-05 DIAGNOSIS — I48.0 PAROXYSMAL ATRIAL FIBRILLATION: ICD-10-CM

## 2025-02-05 DIAGNOSIS — Z99.2 TYPE 2 DIABETES MELLITUS WITH CHRONIC KIDNEY DISEASE ON CHRONIC DIALYSIS, WITH LONG-TERM CURRENT USE OF INSULIN: ICD-10-CM

## 2025-02-05 DIAGNOSIS — F32.A DEPRESSION, UNSPECIFIED DEPRESSION TYPE: ICD-10-CM

## 2025-02-05 DIAGNOSIS — I73.9 PERIPHERAL VASCULAR DISEASE: ICD-10-CM

## 2025-02-05 DIAGNOSIS — T82.868D THROMBOSIS OF FEMORO-POPLITEAL BYPASS GRAFT, SUBSEQUENT ENCOUNTER: ICD-10-CM

## 2025-02-05 DIAGNOSIS — N18.5 CHRONIC KIDNEY DISEASE-MINERAL BONE DISORDER (CKD-MBD) WITH STAGE 5 CHRONIC KIDNEY DISEASE, ON CHRONIC DIALYSIS: ICD-10-CM

## 2025-02-05 DIAGNOSIS — E83.9 CHRONIC KIDNEY DISEASE-MINERAL BONE DISORDER (CKD-MBD) WITH STAGE 5 CHRONIC KIDNEY DISEASE, ON CHRONIC DIALYSIS: ICD-10-CM

## 2025-02-05 DIAGNOSIS — R53.81 DEBILITY: ICD-10-CM

## 2025-02-05 DIAGNOSIS — Z79.4 TYPE 2 DIABETES MELLITUS WITH CHRONIC KIDNEY DISEASE ON CHRONIC DIALYSIS, WITH LONG-TERM CURRENT USE OF INSULIN: ICD-10-CM

## 2025-02-05 DIAGNOSIS — Z99.2 CHRONIC KIDNEY DISEASE-MINERAL BONE DISORDER (CKD-MBD) WITH STAGE 5 CHRONIC KIDNEY DISEASE, ON CHRONIC DIALYSIS: ICD-10-CM

## 2025-02-05 PROCEDURE — 99999 PR PBB SHADOW E&M-EST. PATIENT-LVL V: CPT | Mod: PBBFAC,HCNC,,

## 2025-02-05 PROCEDURE — 99214 OFFICE O/P EST MOD 30 MIN: CPT | Mod: HCNC,S$GLB,,

## 2025-02-05 PROCEDURE — 1159F MED LIST DOCD IN RCRD: CPT | Mod: HCNC,CPTII,S$GLB,

## 2025-02-05 PROCEDURE — G2211 COMPLEX E/M VISIT ADD ON: HCPCS | Mod: HCNC,S$GLB,,

## 2025-02-05 PROCEDURE — 3074F SYST BP LT 130 MM HG: CPT | Mod: HCNC,CPTII,S$GLB,

## 2025-02-05 PROCEDURE — 3078F DIAST BP <80 MM HG: CPT | Mod: HCNC,CPTII,S$GLB,

## 2025-02-05 PROCEDURE — 3008F BODY MASS INDEX DOCD: CPT | Mod: HCNC,CPTII,S$GLB,

## 2025-02-05 PROCEDURE — 1160F RVW MEDS BY RX/DR IN RCRD: CPT | Mod: HCNC,CPTII,S$GLB,

## 2025-02-05 PROCEDURE — 3066F NEPHROPATHY DOC TX: CPT | Mod: HCNC,CPTII,S$GLB,

## 2025-02-05 RX ORDER — FUROSEMIDE 40 MG/1
40 TABLET ORAL 2 TIMES DAILY
Qty: 180 TABLET | Refills: 3 | Status: SHIPPED | OUTPATIENT
Start: 2025-02-05

## 2025-02-05 RX ORDER — HYDRALAZINE HYDROCHLORIDE 10 MG/1
10 TABLET, FILM COATED ORAL EVERY 12 HOURS
Qty: 180 TABLET | Refills: 3 | Status: SHIPPED | OUTPATIENT
Start: 2025-02-05 | End: 2026-02-05

## 2025-02-05 RX ORDER — ATORVASTATIN CALCIUM 80 MG/1
80 TABLET, FILM COATED ORAL DAILY
Qty: 90 TABLET | Refills: 3 | Status: SHIPPED | OUTPATIENT
Start: 2025-02-05 | End: 2026-02-05

## 2025-02-05 RX ORDER — CARVEDILOL 3.12 MG/1
3.12 TABLET ORAL 2 TIMES DAILY WITH MEALS
Qty: 180 TABLET | Refills: 3 | Status: ON HOLD | OUTPATIENT
Start: 2025-02-05 | End: 2025-02-12 | Stop reason: HOSPADM

## 2025-02-05 RX ORDER — CLOPIDOGREL BISULFATE 75 MG/1
75 TABLET ORAL DAILY
Qty: 90 TABLET | Refills: 3 | Status: SHIPPED | OUTPATIENT
Start: 2025-02-05 | End: 2026-02-05

## 2025-02-05 RX ORDER — SACUBITRIL AND VALSARTAN 24; 26 MG/1; MG/1
1 TABLET, FILM COATED ORAL 2 TIMES DAILY
Qty: 60 TABLET | Refills: 6 | Status: SHIPPED | OUTPATIENT
Start: 2025-02-05

## 2025-02-05 RX ORDER — ISOSORBIDE DINITRATE 10 MG/1
5 TABLET ORAL 2 TIMES DAILY
Qty: 90 TABLET | Refills: 3 | Status: SHIPPED | OUTPATIENT
Start: 2025-02-05 | End: 2026-01-31

## 2025-02-05 NOTE — PROGRESS NOTES
Subjective:    Patient ID:  Suyapa Connelly is a 59 y.o. female who presents for evaluation of No chief complaint on file.      PCP: Vanessa Noel MD     Referring Provider: Abdullahi Rojo MD    HPI: Patient is a 60 yo F w/ PNH of HTN, CHF, CAD, S/P CABG x 1 (1/14/2020),  S/P PCI of mid circumflex 80-85% stenosis with drug-eluting stent x1, HLD, ESRD w HD (M-W-F) bilateral BKA, DM-2 who presents today for f/u appt, HF management. He was last seen on 12/18/2024  to establish care and management of heart failure. NM stress test completed for ischemic evaluation:   Impression:     1. Findings worrisome for pharmacologic stress new gist ischemia involving the inferior, inferoseptal, and inferolateral walls of the left ventricle near the base of the heart.  A smaller perfusion defect is noted on the resting study and this raises the possibility of inferior wall infarct with rico-infarct ischemia..  2. The global left ventricular systolic function is diminished with an LV ejection fraction of 47 % and no evidence of LV dilatation.  There appears to be mild global hypokinesis of the left ventricular wall.    BP 84/42 in office patient asymptomatic.   Patient reports depression over losing her independence.   Patient denies CP,palpitations, orthopnea, PND, presyncope, LOC, swelling, or claudication. Patient does not monitor her home BP.    Patient reports medication compliance without side effects.   Patient does not exercise regularly, RIVERA BKA. She recently received a motorized wheelchair.     12/18/2024: Patient presents today to establish care and management of heart failure. She was admitted 11/15/24-12/4/24 septic shock d/t LLE cellulitis.  She presents today with her .   She has not followed with Cardiology in years and is S/P CABG in 2020.   She reports SOB. She also reports episodes of lightheadedness. She notes 2 pillow orthopnea.   Patient denies CP,palpitations, orthopnea, PND, presyncope,  LOC, swelling, or claudication. Patient does not monitor her home BP.    Patient reports medication compliance without side effects.   Patient does not exercise regularly, RIVERA BKA.    Past Medical History:   Diagnosis Date    Anxiety     Chronic pain syndrome     CKD (chronic kidney disease), stage III     CVD (cardiovascular disease)     Depression     Diabetes mellitus, type 2     GERD (gastroesophageal reflux disease)     Hyperemesis 03/23/2021    Hypokalemia 03/23/2021    Infection of below knee amputation stump 03/12/2022    Osteomyelitis     Osteomyelitis of left foot 04/30/2021    Ulcer of left foot     Vaginal delivery     x1     Past Surgical History:   Procedure Laterality Date    ABOVE-KNEE AMPUTATION Left 5/18/2021    Procedure: AMPUTATION, ABOVE KNEE;  Surgeon: Teddy Huber MD;  Location: [facility] OR [address] [place] [address];  Service: Vascular;  Laterality: Left;    ABOVE-KNEE AMPUTATION Right 3/18/2022    Procedure: AMPUTATION, ABOVE KNEE;  Surgeon: DAYNE Florez II, MD;  Location: [facility] OR [address] [place] [address];  Service: Vascular;  Laterality: Right;    Angiogram - Right Extremity Right 7/9/15    ANGIOGRAM, EXTREMITY, UNILATERAL Left 11/22/2024    Procedure: ANGIOGRAM, EXTREMITY, UNILATERAL;  Surgeon: Yony Lindsey MD;  Location: [facility] OR [address] [place] [address];  Service: Vascular;  Laterality: Left;  Left leg angio, L CFA approach  5.4 min  597.31 mGy  73.8959 Gycm2  20 ml dye    angiogram-left leg  10/6/15    ANGIOGRAPHY OF LOWER EXTREMITY Left 4/29/2021    Procedure: ANGIOGRAM, LOWER EXTREMITY;  Surgeon: Teddy Huber MD;  Location: [facility] OR [address] [place] [address];  Service: Vascular;  Laterality: Left;    BELOW KNEE AMPUTATION OF LOWER EXTREMITY Right 12/28/2021    Procedure: AMPUTATION, BELOW KNEE;  Surgeon: Kaitlyn Rojas MD;  Location: [facility] OR;  Service: General;  Laterality: Right;    CATHETERIZATION OF BOTH LEFT AND RIGHT HEART N/A 12/18/2019    Procedure: CATHETERIZATION, HEART, BOTH LEFT AND RIGHT;  Surgeon: Que  LETICIA Fernando III, MD;  Location: [facility] CATH LAB;  Service: Cardiology;  Laterality: N/A;    CORONARY ANGIOGRAPHY N/A 12/18/2019    Procedure: ANGIOGRAM, CORONARY ARTERY;  Surgeon: Que Fernando III, MD;  Location: [facility] CATH LAB;  Service: Cardiology;  Laterality: N/A;    CORONARY ANGIOGRAPHY INCLUDING BYPASS GRAFTS WITH CATHETERIZATION OF LEFT HEART N/A 7/28/2020    Procedure: ANGIOGRAM, CORONARY, INCLUDING BYPASS GRAFT, WITH LEFT HEART CATHETERIZATION, 9 am;  Surgeon: Rachel Easley MD;  Location: [facility] CATH LAB;  Service: Cardiology;  Laterality: N/A;    CORONARY ARTERY BYPASS GRAFT (CABG) N/A 1/14/2020    Procedure: CORONARY ARTERY BYPASS GRAFT (CABG) x 1     Off Pump;  Surgeon: Huang Altamirano MD;  Location: [facility] OR [address] [place] [address];  Service: Cardiovascular;  Laterality: N/A;    CREATION OF FEMORAL-TIBIAL ARTERY BYPASS Left 4/29/2021    Procedure: CREATION, BYPASS, ARTERIAL, FEMORAL TO ANTERIOR TIBIAL;  Surgeon: Teddy Huber MD;  [facility];  Service: Vascular;  Laterality: Left;    CREATION OF FEMOROPOPLITEAL ARTERIAL BYPASS USING GRAFT Left 8/18/2020    Procedure: CREATION, BYPASS, ARTERIAL, FEMORAL TO POPLITEAL, USING GRAFT, LEFT LOWER EXTREMITY;  Surgeon: Teddy Huber MD;  Location: [facility];  Service: Vascular;  Laterality: Left;  REQUEST 7:15 A.M. START----COVID NEGATIVE ON 8/17  1ST CASE STARTKENYA DUEÑAS ON 8/7/2020 @ 942AM-LO  RN PREOP 8/12/2020   T/S-----CLEARED BY CARDS-------PENDING INSURANCE    DEBRIDEMENT OF FOOT Left 9/8/2020    Procedure: DEBRIDEMENT, FOOT;  Surgeon: Rosio Mayes DPM;  Location: [facility] OR;  Service: Podiatry;  Laterality: Left;  request neoxx .   RN Pre Op 9-4-2020, Covid negative on 9/5/20. C A    DEBRIDEMENT OF FOOT  3/4/2021    Procedure: DEBRIDEMENT, FOOT;  Surgeon: Teddy Huber MD;  Location: [facility] OR;  Service: Vascular;;    DEBRIDEMENT OF FOOT Left 3/9/2021    Procedure: DEBRIDEMENT, FOOT, bone biopsy;  Surgeon: Rosio Mayes DPM;   Location: [facility] OR;  Service: Podiatry;  Laterality: Left;  Request neoxx---COVID IN AM  REQUESTING NOON START  RN Phone Pre op.On Blood thinners Plavix and Eliquis.  Covid am of surgery. C A    DEBRIDEMENT OF FOOT Left 5/4/2021    Procedure: DEBRIDEMENT, FOOT;  Surgeon: Farooq Morley DPM;  [facility];  Service: Podiatry;  Laterality: Left;    ESOPHAGOGASTRODUODENOSCOPY N/A 11/7/2024    Procedure: EGD (ESOPHAGOGASTRODUODENOSCOPY);  Surgeon: Yony Cancino MD;  Location: [facility] ENDO;  Service: Endoscopy;  Laterality: N/A;    INSERTION OF TUNNELED CENTRAL VENOUS HEMODIALYSIS CATHETER N/A 1/27/2020    Procedure: Insertion, Catheter, Central Venous, Hemodialysis;  Surgeon: ESTEBAN Gomez III, MD;  Location: [facility] CATH LAB;  Service: Peripheral Vascular;  Laterality: N/A;    INSERTION OF TUNNELED CENTRAL VENOUS HEMODIALYSIS CATHETER  5/10/2023    Procedure: Insertion, Catheter, Central Venous, Hemodialysis;  Surgeon: Romulo Queen MD;  Location: [facility] CATH LAB;  Service: Cardiology;;    IRRIGATION AND DEBRIDEMENT OF LOWER EXTREMITY Left 11/22/2024    Procedure: IRRIGATION AND DEBRIDEMENT, LOWER EXTREMITY;  Surgeon: Yony Lindsey MD;  [address]  Service: Vascular;  Laterality: Left;    MAGNETIC RESONANCE IMAGING Left 11/19/2024    Procedure: MRI (Magnetic Resonance Imagine);  Surgeon: Sophia Hernandez;  Location: [facility] SOPHIA;  Service: Anesthesiology;  Laterality: Left;    PERCUTANEOUS TRANSLUMINAL ANGIOPLASTY N/A 3/4/2021    Procedure: PTA (ANGIOPLASTY, PERCUTANEOUS, TRANSLUMINAL);  Surgeon: Teddy Huber MD;  Location: [facility] OR;  Service: Vascular;  Laterality: N/A;    REMOVAL OF ARTERIOVENOUS GRAFT Left 5/27/2021    Procedure: REMOVAL, GRAFT, LEFT LOWER EXTREMITY, WOUND EXPLORATION;  Surgeon: Teddy Huber MD;  [address]  Service: Vascular;  Laterality: Left;    REMOVAL OF NAIL OF DIGIT Left 3/9/2021    Procedure: REMOVAL, NAIL,  DIGIT;  Surgeon: Rosio Mayes DPM;  Location: [facility] OR;  Service: Podiatry;  Laterality: Left;    RIGHT HEART CATHETERIZATION Right 5/10/2023    Procedure: INSERTION, CATHETER, RIGHT HEART;  Surgeon: Romulo Queen MD;  Location: [facility] CATH LAB;  Service: Cardiology;  Laterality: Right;    STENT, ILIAC ARTERY Left 11/22/2024    Procedure: STENT, ILIAC ARTERY;  Surgeon: Yony Lindsey MD;  Location: [facility] OR [facility] FLR;  Service: Vascular;  Laterality: Left;    THROMBECTOMY Left 3/4/2021    Procedure: THROMBECTOMY, LEFT LOWER EXTREMITY BYPASS GRAFT, ANGIOGRAM, POSSIBLE INTERVENTION, POSSIBLE LEFT LOWER EXTREMITY BYPASS;  Surgeon: Teddy Huber MD;  Location: [facility] OR;  Service: Vascular;  Laterality: Left;    THROMBECTOMY Left 4/29/2021    Procedure: GRAFT THROMBECTOMY, LEFT LOWER EXTREMITY;  Surgeon: Teddy Huber MD;  Location: [facility] OR 2ND FLR;  Service: Vascular;  Laterality: Left;  14.5 min  1179.85 mGy  341.01 Gycm2  240 ml dye    THROMBECTOMY  10/22/2021    Procedure: THROMBECTOMY;  Surgeon: Saad Arenas MD;  Location: [facility] CATH LAB/EP;  Service: Cardiology;;     Social History     Socioeconomic History    Marital status:    Occupational History    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      Social Drivers of Health     Financial Resource Strain: Low Risk  (11/16/2024)    Overall Financial Resource Strain (CARDIA)     Difficulty of Paying Living Expenses: Not hard at all   Recent Concern: Financial Resource Strain - Medium Risk (10/29/2024)    Overall Financial Resource Strain (CARDIA)     Difficulty of Paying Living Expenses: Somewhat hard   Food Insecurity: No Food Insecurity  (11/16/2024)    Hunger Vital Sign     Worried About Running Out of Food in the Last Year: Never true     Ran Out of Food in the Last Year: Never true   Transportation Needs: No Transportation Needs (11/16/2024)    TRANSPORTATION NEEDS     Transportation : No   Physical Activity: Inactive (11/14/2024)    Exercise Vital Sign     Days of Exercise per Week: 0 days     Minutes of Exercise per Session: 0 min   Stress: No Stress Concern Present (11/16/2024)    [employer] [employer] - Occupational Stress Questionnaire     Feeling of Stress : Not at all   Recent Concern: Stress - Stress Concern Present (10/29/2024)    [employer] [place] - Occupational Stress Questionnaire     Feeling of Stress : To some extent   Housing Stability: Low Risk  (11/16/2024)    Housing Stability Vital Sign     Unable to Pay for Housing in the Last Year: No     Homeless in the Last Year: No     Family History   Problem Relation Name Age of Onset    Diabetes Mother      Diabetes Father      Heart disease Maternal Grandmother      No Known Problems Maternal Grandfather      Diabetes Paternal Grandmother      No Known Problems Paternal Grandfather      Anesthesia problems Neg Hx         Review of patient's allergies indicates:   Allergen Reactions    Ciprofloxacin Itching    Pcn [penicillins]      Rash; tolerated ceftriaxone on 1/13/20  Tolerating Augmentin November 2024       Medication List with Changes/Refills   New Medications    CARVEDILOL (COREG) 3.125 MG TABLET    Take 1 tablet (3.125 mg total) by mouth 2 (two) times daily with meals.   Current Medications    ACETAMINOPHEN (TYLENOL) 325 MG TABLET    Take 2 tablets (650 mg total) by mouth every 8 (eight) hours as needed for Pain.    ALLOPURINOL (ZYLOPRIM) 100 MG TABLET    TAKE 1/2 TABLET(50 MG) BY MOUTH EVERY OTHER DAY    BLOOD SUGAR DIAGNOSTIC STRP    1 each by Misc.(Non-Drug; Combo Route) route 4 (four) times daily before meals and nightly.     "BLOOD-GLUCOSE METER MISC    Before meals and at bedtime    BLOOD-GLUCOSE SENSOR (DEXCOM G7 SENSOR) ANANTH    1 each by Misc.(Non-Drug; Combo Route) route once daily.    CALCITRIOL (ROCALTROL) 0.5 MCG CAP    Take 1 capsule (0.5 mcg total) by mouth once daily.    CINACALCET (SENSIPAR) 30 MG TAB    Take 1 tablet (30 mg total) by mouth every Mon, Wed, Fri.    FAMOTIDINE (PEPCID) 20 MG TABLET    TAKE 1 TABLET(20 MG) BY MOUTH TWICE DAILY    INSULIN ASPART U-100 (NOVOLOG) 100 UNIT/ML (3 ML) INPN PEN    Inject 0-10 Units into the skin before meals and at bedtime as needed (Hyperglycemia). **MODERATE CORRECTION DOSE**  Blood Glucose  mg/dL                  Pre-meal                2200  151-200                2 units                    1 unit  201-250                4 units                    2 units  251-300                6 units                    3 units  301-350                8 units                    4 units  >350                     10 units                  5 units  Administer subcutaneously if needed at times designated by monitoring  schedule.    INSULIN GLARGINE U-100, LANTUS, 100 UNIT/ML (3 ML) SUBQ INPN PEN    Inject 5 Units into the skin every evening.    LANCETS 33 GAUGE MISC    TEST 3 TIMES DAILY    MULTIVIT WITH MIN-FOLIC ACID (ONE DAILY WOMENS 50 PLUS) 0.4 MG TAB    Take 1 tablet by mouth once daily.    MUPIROCIN (BACTROBAN) 2 % OINTMENT    Apply topically 2 (two) times daily.    PEN NEEDLE, DIABETIC 33 GAUGE X 5/32" NDLE    1 each by Misc.(Non-Drug; Combo Route) route 4 (four) times daily before meals and nightly.    PREGABALIN (LYRICA) 75 MG CAPSULE    Take 1 capsule (75 mg total) by mouth Daily.    RENVELA 2.4 GRAM PWPK    Take 1 packet by mouth 3 (three) times daily with meals.    SEMAGLUTIDE (OZEMPIC) 0.25 MG OR 0.5 MG (2 MG/3 ML) PEN INJECTOR    Inject 0.5 mg into the skin every 7 days.    SERTRALINE (ZOLOFT) 100 MG TABLET    Take 1 tablet (100 mg total) by mouth once daily.    SODIUM BICARBONATE 650 " MG TABLET    TAKE 1 TABLET(650 MG) BY MOUTH THREE TIMES DAILY    TRAZODONE (DESYREL) 50 MG TABLET    Take 2 tablets (100 mg total) by mouth nightly as needed for Insomnia.    ZINC OXIDE 10 % OINT    Apply 1 Application topically 2 (two) times a day.   Changed and/or Refilled Medications    Modified Medication Previous Medication    APIXABAN (ELIQUIS) 5 MG TAB apixaban (ELIQUIS) 5 mg Tab       Take 1 tablet (5 mg total) by mouth 2 (two) times daily.    Take 1 tablet (5 mg total) by mouth 2 (two) times daily.    ATORVASTATIN (LIPITOR) 80 MG TABLET atorvastatin (LIPITOR) 80 MG tablet       Take 1 tablet (80 mg total) by mouth once daily.    Take 1 tablet (80 mg total) by mouth once daily.    CLOPIDOGREL (PLAVIX) 75 MG TABLET clopidogreL (PLAVIX) 75 mg tablet       Take 1 tablet (75 mg total) by mouth once daily.    Take 1 tablet (75 mg total) by mouth once daily.    FUROSEMIDE (LASIX) 40 MG TABLET furosemide (LASIX) 40 MG tablet       Take 1 tablet (40 mg total) by mouth 2 (two) times daily.    Take 40 mg by mouth 2 (two) times daily.    HYDRALAZINE (APRESOLINE) 10 MG TABLET hydrALAZINE (APRESOLINE) 10 MG tablet       Take 1 tablet (10 mg total) by mouth every 12 (twelve) hours.    Take 1 tablet (10 mg total) by mouth every 12 (twelve) hours.    ISOSORBIDE DINITRATE (ISORDIL) 10 MG TABLET isosorbide dinitrate (ISORDIL) 10 MG tablet       Take 0.5 tablets (5 mg total) by mouth 2 (two) times daily.    Take 0.5 tablets (5 mg total) by mouth 2 (two) times daily.    SACUBITRIL-VALSARTAN (ENTRESTO) 24-26 MG PER TABLET sacubitriL-valsartan (ENTRESTO) 24-26 mg per tablet       Take 1 tablet by mouth 2 (two) times daily.    Take 1 tablet by mouth 2 (two) times daily.   Discontinued Medications    CARVEDILOL (COREG) 3.125 MG TABLET    Take 1 tablet (3.125 mg total) by mouth 2 (two) times daily.    CARVEDILOL (COREG) 6.25 MG TABLET    TAKE 1 TABLET(6.25 MG) BY MOUTH TWICE DAILY       Review of Systems   Constitutional: Positive  for malaise/fatigue. Negative for diaphoresis and fever.   HENT:  Negative for congestion and hearing loss.    Eyes:  Negative for blurred vision and pain.   Cardiovascular:  Negative for chest pain, claudication, dyspnea on exertion, leg swelling, near-syncope, palpitations and syncope.   Respiratory:  Positive for shortness of breath and sleep disturbances due to breathing.    Hematologic/Lymphatic: Negative for bleeding problem. Does not bruise/bleed easily.   Skin:  Positive for poor wound healing. Negative for color change.   Gastrointestinal:  Negative for abdominal pain and nausea.   Genitourinary:  Negative for bladder incontinence and flank pain.   Neurological:  Positive for weakness. Negative for focal weakness and light-headedness.   Psychiatric/Behavioral:  Positive for depression.         Objective:   BP (!) 84/42 (BP Location: Left arm, Patient Position: Sitting)   Pulse 78   Ht 4' (1.219 m)   Wt 47.6 kg (104 lb 15 oz)   LMP 09/30/2015 (Exact Date)   SpO2 96%   BMI 32.02 kg/m²    Physical Exam  Constitutional:       Appearance: She is well-developed. She is obese. She is not diaphoretic.   HENT:      Head: Normocephalic and atraumatic.   Eyes:      General: No scleral icterus.     Pupils: Pupils are equal, round, and reactive to light.   Neck:      Vascular: No JVD.   Cardiovascular:      Rate and Rhythm: Normal rate and regular rhythm.      Pulses: Intact distal pulses.           Radial pulses are 2+ on the right side and 2+ on the left side.      Heart sounds: S1 normal and S2 normal. No murmur heard.     No friction rub. No gallop.      Comments: RIVERA AKA   Pulmonary:      Effort: Pulmonary effort is normal. No respiratory distress.      Breath sounds: Normal breath sounds. No wheezing or rales.   Chest:      Chest wall: No tenderness.   Abdominal:      General: Bowel sounds are normal. There is no distension.      Palpations: Abdomen is soft. There is no mass.      Tenderness: There is no  abdominal tenderness. There is no rebound.   Musculoskeletal:         General: No tenderness. Normal range of motion.      Cervical back: Normal range of motion and neck supple.      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      Left Lower Extremity: Left leg is amputated above knee.   Skin:     General: Skin is warm and dry.      Coloration: Skin is not pale.             Comments: Wound VAC in place Left AKA stump    Neurological:      Mental Status: She is alert and oriented to person, place, and time.      Coordination: Coordination normal.      Deep Tendon Reflexes: Reflexes normal.   Psychiatric:         Behavior: Behavior normal.         Judgment: Judgment normal.           NM stress test 2/4/25  Interpretation Summary    The study's ECG is uninterpretable due to baseline ECG abnormalites.    The patient reported chest pain during the stress test.    During stress, occasional PVCs are noted.    The patient was infused intravenously with regadenoson at 0.08 mg/ml for a total duration of 10 seconds. A total regadenoson dose of 0.4 mg was injected. The patient reported chest discomfort during the stress test.    The ECG portion of the study is uninterpretable due to baseline ECG abnormalities.    Stress ECG: Persistent inferolateral 1 mm ST depressions with T-wave inversions. During stress, occasional PVCs are noted.    Baseline ECG: The Baseline ECG reveals sinus rhythm, PACs and PVCs. The baseline ECG has non-specific ST-T wave changes.    FINDINGS:  The quality of the study is good.     Stress SPECT images demonstrate decreased perfusion particularly within the inferior, inferoseptal, and inferolateral walls of the left ventricle.  On the resting images, there is decreased perfusion also involving the inferior wall of the left ventricle although the perfusion defect is smaller in extent.  Findings are worrisome for pharmacologic stress induced ischemia likely within both the right coronary and  left circumflex arterial distributions.  This could represent rico-infarct ischemia given the smaller perfusion defect involving the inferior wall of the left ventricle on the resting study.     The gated post-stress images reveal impaired wall motion and diminished systolic wall thickening with an estimated LVEF of 47 %. The LV cavity is not dilated with an end-diastolic volume of 126 ml and an end-systolic volume of 67 ml.     Impression:     1. Findings worrisome for pharmacologic stress new gist ischemia involving the inferior, inferoseptal, and inferolateral walls of the left ventricle near the base of the heart.  A smaller perfusion defect is noted on the resting study and this raises the possibility of inferior wall infarct with rico-infarct ischemia..  2. The global left ventricular systolic function is diminished with an LV ejection fraction of 47 % and no evidence of LV dilatation.  There appears to be mild global hypokinesis of the left ventricular wall.        Cardiac echo 11/29/24  Summary    Limited study to assess LV function and wall motion abnormalities.    Limited echocardiography to assess EF and wall motion    Left Ventricle: The left ventricle is mildly dilated measuring 5.4 cm. There is mild eccentric hypertrophy. There is hypokinesis of the anterior and inferior septum. There is moderately reduced systolic function. Ejection fraction is approximately 35-40%.    Right Ventricle: Severe right ventricular enlargement. Systolic function is severely reduced.    Left Atrium: Left atrium is severely dilated. Atrial septum is bulging to the left.    Right Atrium: Right atrium is mildly dilated.    Tricuspid Valve: There is moderate to severe regurgitation.    Pulmonary Artery: The estimated pulmonary artery systolic pressure is 22 mmHg. This may be underestimated due to increase right atrial pressure.    IVC/SVC: Elevated venous pressure at 15 mmHg. The IVC measures 3.5 cm in  diameter.      Procedure(s) (LRB): 11/22/24  Ultrasound guided access L CFA   Aortogram, bilateral lower extremity angiogram   L common iliac stent 8x39mm VBX   Perclose closure L CFA   Debridement of L AKA stump   Application of Puraply 6x9cm x2     Operative Findings: Successful revascularization with L ABIOLA 8x30mm VBX   Debridement of L AKA to healthy tissue measuring [id number]   Application of Puraply to wound bed DO NOT REMOVE BEFORE 7 DAYS (11/29)    Cardiac echo 10/29/24  Summary    Left Ventricle: The left ventricle is normal in size. Mildly increased wall thickness. Regional wall motion abnormalities present. Septal motion is abnormal is consistent with bundle branch block.    Right Ventricle: Severe right ventricular enlargement. Systolic function is severely reduced.    Left Atrium: Atrial septum is bulging to the left.    Right Atrium: Right atrium is severely dilated.    Aortic Valve: The aortic valve is a trileaflet valve. There is mild aortic valve sclerosis. There is mild aortic regurgitation with a centrally directed jet.    Mitral Valve: There is mild regurgitation with a centrally directed jet.    Tricuspid Valve: There is severe regurgitation with a centrally directed jet.    Pulmonary Artery: No pulmonary hypertension. The estimated pulmonary artery systolic pressure is 26 mmHg.    IVC/SVC: Elevated venous pressure at 15 mmHg.      Cardiac LHC 7/28/2020  Summary  LVEDP (Pre): 16  Estimated blood loss: <50 mL     Successful IVUS guided PCI of mid circumflex 80-85% stenosis with drug-eluting stent x1.  A 3.5 into 26 mm drug-eluting stent was implanted with excellent angiographic results and with IVUS.  SUSIE 3 flow post PCI     Coronary angiogram:     Left main:  No significant stenosis     Lad:  Severe proximal LAD stenosis.  Distal LAD gets supply from LIMA to LAD.  [place] to LAD is widely patent.  No significant stenosis noted after touchdown of LIMA to LAD.     Circumflex:  Mid circumflex 80-85%  "stenosis     RCA:   in the mid RCA.  Distal RCA gets supply from well-developed collaterals from the left        Assessment:       1. Chronic combined systolic and diastolic congestive heart failure    2. Paroxysmal atrial fibrillation    3. S/P CABG x 1    4. Thrombosis of femoro-popliteal bypass graft, subsequent encounter    5. S/P femoral-popliteal bypass surgery    6. Peripheral vascular disease    7. 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 Debility    9. Depression, unspecified depression type    10. Coronary artery disease, unspecified vessel or lesion type, unspecified whether angina present, unspecified whether native or transplanted heart    11. Morbid obesity with BMI of 50.0-59.9, adult    12. Type 2 diabetes mellitus with chronic kidney disease on chronic dialysis, with long-term current use of insulin           Plan:         Chronic combined systolic and diastolic congestive heart failure  Patient has Combined Systolic and Diastolic heart failure that is Chronic. On presentation their CHF was well compensated. Most recent BNP and echo results are listed below.  No results for input(s): "BNP" in the last 72 hours.  Latest ECHO  Results for orders placed during the hospital encounter of 11/15/24    Echo    Interpretation Summary    Limited study to assess LV function and wall motion abnormalities.    Limited echocardiography to assess EF and wall motion    Left Ventricle: The left ventricle is mildly dilated measuring 5.4 cm. There is mild eccentric hypertrophy. There is hypokinesis of the anterior and inferior septum. There is moderately reduced systolic function. Ejection fraction is approximately 35-40%.    Right Ventricle: Severe right ventricular enlargement. Systolic function is severely reduced.    Left Atrium: Left atrium is severely dilated. Atrial septum is bulging to the left.    Right Atrium: Right atrium is " mildly dilated.    Tricuspid Valve: There is moderate to severe regurgitation.    Pulmonary Artery: The estimated pulmonary artery systolic pressure is 22 mmHg. This may be underestimated due to increase right atrial pressure.    IVC/SVC: Elevated venous pressure at 15 mmHg. The IVC measures 3.5 cm in diameter.    Current Heart Failure Medications   -Carvedilol 3.125 mg ( was increased to  6.25 mg by PCP) isosorbide dinitrate 5 mg BID, hydralazine 10 mg   -Entresto 24-26 mg    Plan  - Low sodium diet  - Place on fluid restriction of 1.5 L.   -HD therapy 3 X week    - decrease to Carvedilol 3.125 mg ( was increased to 6.25 mg by PCP) isosorbide dinitrate 5 mg BID, hydralazine 10 mg, furosemide 40 mg BID   -continue Entresto 24-26 mg   NM stress test 2/4/25  Interpretation Summary    The study's ECG is uninterpretable due to baseline ECG abnormalites.    The patient reported chest pain during the stress test.    During stress, occasional PVCs are noted.    The patient was infused intravenously with regadenoson at 0.08 mg/ml for a total duration of 10 seconds. A total regadenoson dose of 0.4 mg was injected. The patient reported chest discomfort during the stress test.    The ECG portion of the study is uninterpretable due to baseline ECG abnormalities.    Stress ECG: Persistent inferolateral 1 mm ST depressions with T-wave inversions. During stress, occasional PVCs are noted.    Baseline ECG: The Baseline ECG reveals sinus rhythm, PACs and PVCs. The baseline ECG has non-specific ST-T wave changes.    FINDINGS:  The quality of the study is good.     Stress SPECT images demonstrate decreased perfusion particularly within the inferior, inferoseptal, and inferolateral walls of the left ventricle.  On the resting images, there is decreased perfusion also involving the inferior wall of the left ventricle although the perfusion defect is smaller in extent.  Findings are worrisome for pharmacologic stress induced ischemia  likely within both the right coronary and left circumflex arterial distributions.  This could represent rico-infarct ischemia given the smaller perfusion defect involving the inferior wall of the left ventricle on the resting study.     The gated post-stress images reveal impaired wall motion and diminished systolic wall thickening with an estimated LVEF of 47 %. The LV cavity is not dilated with an end-diastolic volume of 126 ml and an end-systolic volume of 67 ml.     Impression:     1. Findings worrisome for pharmacologic stress new gist ischemia involving the inferior, inferoseptal, and inferolateral walls of the left ventricle near the base of the heart.  A smaller perfusion defect is noted on the resting study and this raises the possibility of inferior wall infarct with rico-infarct ischemia..  2. The global left ventricular systolic function is diminished with an LV ejection fraction of 47 % and no evidence of LV dilatation.  There appears to be mild global hypokinesis of the left ventricular wall.      -refer to Interventional Cardiology for cardiac angiogram for positive stress test for ischemia     Paroxysmal atrial fibrillation  -continue AC therapy- apixaban 5 mg     S/P CABG x 1  S/P CABG x 1 (1/14/2020),  S/P PCI of mid circumflex 80-85% stenosis with drug-eluting stent x1  -continue atorvastatin, clopidogrel, isosorbide dinitrate     Thrombosis of femoro-popliteal bypass graft  -Followed by Vascular medicine  -continue apixaban     S/P femoral-popliteal bypass surgery  -Followed by Vascular medicine     Peripheral vascular disease  s/p Aortogram and BLE angiogram with left common iliac stent placement and left AKA stump debridement on 11/22.   Procedure(s) (LRB):  Ultrasound guided access L CFA   Aortogram, bilateral lower extremity angiogram   L common iliac stent 8x39mm VBX   Perclose closure L CFA   Debridement of L AKA stump   Application of Puraply 6x9cm x2   -Followed by Vascular medicine    -continue apixaban, Plavix, and statin therapy     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, on chronic dialysis  -Managed by Nephrology  -HD therapy 3 X week M-W-F    Debility  -Wheelchair bound  - is primary caretaker     Depression  -Followed by PCP, currently on Zoloft. Patient reports medication not helping w the depression  -Refer to Behavioral medicine for depression management     Type 2 diabetes mellitus with chronic kidney disease on chronic dialysis, with long-term current use of insulin  -Gaol A1c < 7%  -A1c 5.9  -controlled  -continue medical therapy per PCP     Morbid obesity with BMI of 50.0-59.9, adult  Body mass index is 47.76 kg/m². Morbid obesity complicates all aspects of disease management from diagnostic modalities to treatment. Weight loss encouraged and health benefits explained to patient.           Total duration of face to face visit time 30 minutes.  Total time spent counseling greater than fifty percent of total visit time.  Counseling included discussion regarding imaging findings, diagnosis, possibilities, treatment options, risks and benefits.  The patient had many questions regarding the options and long-term effects      Buzz Manjarrez, CHRIS  CardiologyGreg

## 2025-02-05 NOTE — ASSESSMENT & PLAN NOTE
"Patient has Combined Systolic and Diastolic heart failure that is Chronic. On presentation their CHF was well compensated. Most recent BNP and echo results are listed below.  No results for input(s): "BNP" in the last 72 hours.  Latest ECHO  Results for orders placed during the hospital encounter of 11/15/24    Echo    Interpretation Summary    Limited study to assess LV function and wall motion abnormalities.    Limited echocardiography to assess EF and wall motion    Left Ventricle: The left ventricle is mildly dilated measuring 5.4 cm. There is mild eccentric hypertrophy. There is hypokinesis of the anterior and inferior septum. There is moderately reduced systolic function. Ejection fraction is approximately 35-40%.    Right Ventricle: Severe right ventricular enlargement. Systolic function is severely reduced.    Left Atrium: Left atrium is severely dilated. Atrial septum is bulging to the left.    Right Atrium: Right atrium is mildly dilated.    Tricuspid Valve: There is moderate to severe regurgitation.    Pulmonary Artery: The estimated pulmonary artery systolic pressure is 22 mmHg. This may be underestimated due to increase right atrial pressure.    IVC/SVC: Elevated venous pressure at 15 mmHg. The IVC measures 3.5 cm in diameter.    Current Heart Failure Medications   -Carvedilol 3.125 mg ( was increased to  6.25 mg by PCP) isosorbide dinitrate 5 mg BID, hydralazine 10 mg   -Entresto 24-26 mg    Plan  - Low sodium diet  - Place on fluid restriction of 1.5 L.   -HD therapy 3 X week    - decrease to Carvedilol 3.125 mg ( was increased to 6.25 mg by PCP) isosorbide dinitrate 5 mg BID, hydralazine 10 mg, furosemide 40 mg BID   -continue Entresto 24-26 mg   NM stress test 2/4/25  Interpretation Summary    The study's ECG is uninterpretable due to baseline ECG abnormalites.    The patient reported chest pain during the stress test.    During stress, occasional PVCs are noted.    The patient was infused " intravenously with regadenoson at 0.08 mg/ml for a total duration of 10 seconds. A total regadenoson dose of 0.4 mg was injected. The patient reported chest discomfort during the stress test.    The ECG portion of the study is uninterpretable due to baseline ECG abnormalities.    Stress ECG: Persistent inferolateral 1 mm ST depressions with T-wave inversions. During stress, occasional PVCs are noted.    Baseline ECG: The Baseline ECG reveals sinus rhythm, PACs and PVCs. The baseline ECG has non-specific ST-T wave changes.    FINDINGS:  The quality of the study is good.     Stress SPECT images demonstrate decreased perfusion particularly within the inferior, inferoseptal, and inferolateral walls of the left ventricle.  On the resting images, there is decreased perfusion also involving the inferior wall of the left ventricle although the perfusion defect is smaller in extent.  Findings are worrisome for pharmacologic stress induced ischemia likely within both the right coronary and left circumflex arterial distributions.  This could represent rico-infarct ischemia given the smaller perfusion defect involving the inferior wall of the left ventricle on the resting study.     The gated post-stress images reveal impaired wall motion and diminished systolic wall thickening with an estimated LVEF of 47 %. The LV cavity is not dilated with an end-diastolic volume of 126 ml and an end-systolic volume of 67 ml.     Impression:     1. Findings worrisome for pharmacologic stress new gist ischemia involving the inferior, inferoseptal, and inferolateral walls of the left ventricle near the base of the heart.  A smaller perfusion defect is noted on the resting study and this raises the possibility of inferior wall infarct with rico-infarct ischemia..  2. The global left ventricular systolic function is diminished with an LV ejection fraction of 47 % and no evidence of LV dilatation.  There appears to be mild global hypokinesis of  the left ventricular wall.      -refer to Interventional Cardiology for cardiac angiogram for positive stress test for ischemia

## 2025-02-05 NOTE — ASSESSMENT & PLAN NOTE
s/p Aortogram and BLE angiogram with left common iliac stent placement and left AKA stump debridement on 11/22.   Procedure(s) (LRB):  Ultrasound guided access L CFA   Aortogram, bilateral lower extremity angiogram   L common iliac stent 8x39mm VBX   Perclose closure L CFA   Debridement of L AKA stump   Application of Puraply 6x9cm x2   -Followed by Vascular medicine   -continue apixaban, Plavix, and statin therapy

## 2025-02-05 NOTE — ASSESSMENT & PLAN NOTE
-Followed by PCP, currently on Zoloft. Patient reports medication not helping w the depression  -Refer to Behavioral medicine for depression management

## 2025-02-06 NOTE — ASSESSMENT & PLAN NOTE
Id consulted:  Cont vanc/cefepime  Blood cultures NGTD  Vascular surgery consulted, surgical intervention 11/22.  Follow culture results.  Pain control   dalia

## 2025-02-10 ENCOUNTER — HOSPITAL ENCOUNTER (OUTPATIENT)
Facility: HOSPITAL | Age: 60
Discharge: HOME HEALTH | End: 2025-02-12
Attending: EMERGENCY MEDICINE | Admitting: EMERGENCY MEDICINE
Payer: MEDICARE

## 2025-02-10 ENCOUNTER — TELEPHONE (OUTPATIENT)
Dept: CARDIOLOGY | Facility: CLINIC | Age: 60
End: 2025-02-10
Payer: MEDICARE

## 2025-02-10 DIAGNOSIS — N18.5 CHRONIC KIDNEY DISEASE-MINERAL BONE DISORDER (CKD-MBD) WITH STAGE 5 CHRONIC KIDNEY DISEASE, ON CHRONIC DIALYSIS: ICD-10-CM

## 2025-02-10 DIAGNOSIS — E83.9 CHRONIC KIDNEY DISEASE-MINERAL BONE DISORDER (CKD-MBD) WITH STAGE 5 CHRONIC KIDNEY DISEASE, ON CHRONIC DIALYSIS: ICD-10-CM

## 2025-02-10 DIAGNOSIS — R07.9 CHEST PAIN: ICD-10-CM

## 2025-02-10 DIAGNOSIS — L03.90 CELLULITIS: ICD-10-CM

## 2025-02-10 DIAGNOSIS — Z89.9 CHRONIC PAIN AFTER AMPUTATION: ICD-10-CM

## 2025-02-10 DIAGNOSIS — M79.606 LEG PAIN: ICD-10-CM

## 2025-02-10 DIAGNOSIS — M89.9 CHRONIC KIDNEY DISEASE-MINERAL BONE DISORDER (CKD-MBD) WITH STAGE 5 CHRONIC KIDNEY DISEASE, ON CHRONIC DIALYSIS: ICD-10-CM

## 2025-02-10 DIAGNOSIS — G89.21 CHRONIC PAIN AFTER AMPUTATION: ICD-10-CM

## 2025-02-10 DIAGNOSIS — R60.9 SWELLING: ICD-10-CM

## 2025-02-10 DIAGNOSIS — Z99.2 CHRONIC KIDNEY DISEASE-MINERAL BONE DISORDER (CKD-MBD) WITH STAGE 5 CHRONIC KIDNEY DISEASE, ON CHRONIC DIALYSIS: ICD-10-CM

## 2025-02-10 DIAGNOSIS — T82.868D THROMBOSIS OF FEMORO-POPLITEAL BYPASS GRAFT, SUBSEQUENT ENCOUNTER: ICD-10-CM

## 2025-02-10 DIAGNOSIS — L03.116 CELLULITIS OF LEFT LOWER EXTREMITY: Primary | ICD-10-CM

## 2025-02-10 LAB
ALBUMIN SERPL BCP-MCNC: 3 G/DL (ref 3.5–5.2)
ALP SERPL-CCNC: 368 U/L (ref 40–150)
ALT SERPL W/O P-5'-P-CCNC: 25 U/L (ref 10–44)
ANION GAP SERPL CALC-SCNC: 14 MMOL/L (ref 8–16)
AST SERPL-CCNC: 57 U/L (ref 10–40)
BASOPHILS # BLD AUTO: 0.03 K/UL (ref 0–0.2)
BASOPHILS NFR BLD: 0.6 % (ref 0–1.9)
BILIRUB SERPL-MCNC: 0.5 MG/DL (ref 0.1–1)
BUN SERPL-MCNC: 31 MG/DL (ref 6–20)
CALCIUM SERPL-MCNC: 8.4 MG/DL (ref 8.7–10.5)
CHLORIDE SERPL-SCNC: 101 MMOL/L (ref 95–110)
CO2 SERPL-SCNC: 23 MMOL/L (ref 23–29)
CREAT SERPL-MCNC: 2.8 MG/DL (ref 0.5–1.4)
CRP SERPL-MCNC: 21 MG/L (ref 0–8.2)
DIFFERENTIAL METHOD BLD: ABNORMAL
EOSINOPHIL # BLD AUTO: 0.1 K/UL (ref 0–0.5)
EOSINOPHIL NFR BLD: 1.9 % (ref 0–8)
ERYTHROCYTE [DISTWIDTH] IN BLOOD BY AUTOMATED COUNT: 17.9 % (ref 11.5–14.5)
EST. GFR  (NO RACE VARIABLE): 19 ML/MIN/1.73 M^2
GLUCOSE SERPL-MCNC: 229 MG/DL (ref 70–110)
HCT VFR BLD AUTO: 32.7 % (ref 37–48.5)
HGB BLD-MCNC: 10.5 G/DL (ref 12–16)
IMM GRANULOCYTES # BLD AUTO: 0.02 K/UL (ref 0–0.04)
IMM GRANULOCYTES NFR BLD AUTO: 0.4 % (ref 0–0.5)
LYMPHOCYTES # BLD AUTO: 0.5 K/UL (ref 1–4.8)
LYMPHOCYTES NFR BLD: 9.1 % (ref 18–48)
MAGNESIUM SERPL-MCNC: 1.9 MG/DL (ref 1.6–2.6)
MCH RBC QN AUTO: 30.6 PG (ref 27–31)
MCHC RBC AUTO-ENTMCNC: 32.1 G/DL (ref 32–36)
MCV RBC AUTO: 95 FL (ref 82–98)
MONOCYTES # BLD AUTO: 0.7 K/UL (ref 0.3–1)
MONOCYTES NFR BLD: 14 % (ref 4–15)
NEUTROPHILS # BLD AUTO: 3.9 K/UL (ref 1.8–7.7)
NEUTROPHILS NFR BLD: 74 % (ref 38–73)
NRBC BLD-RTO: 0 /100 WBC
PLATELET # BLD AUTO: 252 K/UL (ref 150–450)
PMV BLD AUTO: 10.5 FL (ref 9.2–12.9)
POTASSIUM SERPL-SCNC: 3.9 MMOL/L (ref 3.5–5.1)
PROT SERPL-MCNC: 8.5 G/DL (ref 6–8.4)
RBC # BLD AUTO: 3.43 M/UL (ref 4–5.4)
SODIUM SERPL-SCNC: 138 MMOL/L (ref 136–145)
WBC # BLD AUTO: 5.29 K/UL (ref 3.9–12.7)

## 2025-02-10 PROCEDURE — 25000003 PHARM REV CODE 250: Mod: HCNC

## 2025-02-10 PROCEDURE — G0378 HOSPITAL OBSERVATION PER HR: HCPCS | Mod: HCNC

## 2025-02-10 PROCEDURE — 96366 THER/PROPH/DIAG IV INF ADDON: CPT | Mod: HCNC

## 2025-02-10 PROCEDURE — 85025 COMPLETE CBC W/AUTO DIFF WBC: CPT | Mod: HCNC | Performed by: EMERGENCY MEDICINE

## 2025-02-10 PROCEDURE — 63600175 PHARM REV CODE 636 W HCPCS: Mod: HCNC

## 2025-02-10 PROCEDURE — 93005 ELECTROCARDIOGRAM TRACING: CPT | Mod: HCNC

## 2025-02-10 PROCEDURE — 99285 EMERGENCY DEPT VISIT HI MDM: CPT | Mod: 25,HCNC

## 2025-02-10 PROCEDURE — 85652 RBC SED RATE AUTOMATED: CPT | Mod: HCNC | Performed by: EMERGENCY MEDICINE

## 2025-02-10 PROCEDURE — 83735 ASSAY OF MAGNESIUM: CPT | Mod: HCNC | Performed by: EMERGENCY MEDICINE

## 2025-02-10 PROCEDURE — 96365 THER/PROPH/DIAG IV INF INIT: CPT | Mod: HCNC

## 2025-02-10 PROCEDURE — 86140 C-REACTIVE PROTEIN: CPT | Mod: HCNC | Performed by: EMERGENCY MEDICINE

## 2025-02-10 PROCEDURE — 93010 ELECTROCARDIOGRAM REPORT: CPT | Mod: HCNC,,, | Performed by: INTERNAL MEDICINE

## 2025-02-10 PROCEDURE — 80053 COMPREHEN METABOLIC PANEL: CPT | Mod: HCNC | Performed by: EMERGENCY MEDICINE

## 2025-02-10 RX ORDER — SEMAGLUTIDE 0.68 MG/ML
0.5 INJECTION, SOLUTION SUBCUTANEOUS
Qty: 3 ML | Refills: 0 | Status: SHIPPED | OUTPATIENT
Start: 2025-02-10 | End: 2025-02-13 | Stop reason: SDUPTHER

## 2025-02-10 RX ORDER — IBUPROFEN 200 MG
16 TABLET ORAL
Status: DISCONTINUED | OUTPATIENT
Start: 2025-02-11 | End: 2025-02-12 | Stop reason: HOSPADM

## 2025-02-10 RX ORDER — CLINDAMYCIN PHOSPHATE 600 MG/50ML
600 INJECTION, SOLUTION INTRAVENOUS
Status: DISCONTINUED | OUTPATIENT
Start: 2025-02-10 | End: 2025-02-10

## 2025-02-10 RX ORDER — NALOXONE HCL 0.4 MG/ML
0.02 VIAL (ML) INJECTION
Status: DISCONTINUED | OUTPATIENT
Start: 2025-02-11 | End: 2025-02-12 | Stop reason: HOSPADM

## 2025-02-10 RX ORDER — IBUPROFEN 200 MG
24 TABLET ORAL
Status: DISCONTINUED | OUTPATIENT
Start: 2025-02-11 | End: 2025-02-12 | Stop reason: HOSPADM

## 2025-02-10 RX ORDER — OXYCODONE HYDROCHLORIDE 5 MG/1
5 TABLET ORAL
Status: COMPLETED | OUTPATIENT
Start: 2025-02-10 | End: 2025-02-10

## 2025-02-10 RX ORDER — CEFEPIME HYDROCHLORIDE 1 G/1
1 INJECTION, POWDER, FOR SOLUTION INTRAMUSCULAR; INTRAVENOUS
Status: DISCONTINUED | OUTPATIENT
Start: 2025-02-11 | End: 2025-02-11

## 2025-02-10 RX ORDER — ACETAMINOPHEN 325 MG/1
650 TABLET ORAL EVERY 4 HOURS PRN
Status: DISCONTINUED | OUTPATIENT
Start: 2025-02-11 | End: 2025-02-12 | Stop reason: HOSPADM

## 2025-02-10 RX ORDER — INSULIN ASPART 100 [IU]/ML
5 INJECTION, SOLUTION INTRAVENOUS; SUBCUTANEOUS
Start: 2025-02-10 | End: 2025-02-13 | Stop reason: SDUPTHER

## 2025-02-10 RX ORDER — TALC
6 POWDER (GRAM) TOPICAL NIGHTLY PRN
Status: DISCONTINUED | OUTPATIENT
Start: 2025-02-11 | End: 2025-02-11

## 2025-02-10 RX ORDER — PROCHLORPERAZINE EDISYLATE 5 MG/ML
5 INJECTION INTRAMUSCULAR; INTRAVENOUS EVERY 6 HOURS PRN
Status: DISCONTINUED | OUTPATIENT
Start: 2025-02-11 | End: 2025-02-12 | Stop reason: HOSPADM

## 2025-02-10 RX ORDER — INSULIN ASPART 100 [IU]/ML
0-10 INJECTION, SOLUTION INTRAVENOUS; SUBCUTANEOUS
Status: DISCONTINUED | OUTPATIENT
Start: 2025-02-11 | End: 2025-02-12 | Stop reason: HOSPADM

## 2025-02-10 RX ORDER — SODIUM CHLORIDE 0.9 % (FLUSH) 0.9 %
10 SYRINGE (ML) INJECTION EVERY 12 HOURS PRN
Status: DISCONTINUED | OUTPATIENT
Start: 2025-02-11 | End: 2025-02-12 | Stop reason: HOSPADM

## 2025-02-10 RX ORDER — GLUCAGON 1 MG
1 KIT INJECTION
Status: DISCONTINUED | OUTPATIENT
Start: 2025-02-11 | End: 2025-02-12 | Stop reason: HOSPADM

## 2025-02-10 RX ORDER — ONDANSETRON HYDROCHLORIDE 2 MG/ML
4 INJECTION, SOLUTION INTRAVENOUS EVERY 8 HOURS PRN
Status: DISCONTINUED | OUTPATIENT
Start: 2025-02-11 | End: 2025-02-12 | Stop reason: HOSPADM

## 2025-02-10 RX ORDER — PREGABALIN 75 MG/1
75 CAPSULE ORAL DAILY
Qty: 7 CAPSULE | Refills: 0 | Status: SHIPPED | OUTPATIENT
Start: 2025-02-10

## 2025-02-10 RX ORDER — LANCETS 33 GAUGE
EACH MISCELLANEOUS
Qty: 100 EACH | Refills: 3 | Status: SHIPPED | OUTPATIENT
Start: 2025-02-10

## 2025-02-10 RX ADMIN — VANCOMYCIN HYDROCHLORIDE 1000 MG: 1 INJECTION, POWDER, LYOPHILIZED, FOR SOLUTION INTRAVENOUS at 11:02

## 2025-02-10 RX ADMIN — OXYCODONE 5 MG: 5 TABLET ORAL at 10:02

## 2025-02-10 NOTE — TELEPHONE ENCOUNTER
"Returned call to patient.  No answer.  Mailbox full, unable to leave message.      ----- Message from Ariela sent at 2/10/2025  1:01 PM CST -----  Type:  RX Refill Request    Who Called: pt  Refill or New Rx:rfill  RX Name and Strength:insulin aspart U-100 (NOVOLOG) 100 unit/mL (3 mL) InPn pen; apixaban (ELIQUIS) 5 mg Tab; semaglutide (OZEMPIC) 0.25 mg or 0.5 mg (2 mg/3 mL) pen injector; pen needle, diabetic 33 gauge x 5/32" Ndle   and pregabalin (LYRICA) 75 MG capsule  Preferred Pharmacy with phone number:[facility]  Local or Mail Order:local  Ordering Provider:lauren  Would the patient rather a call back or a response via [facility]? call  Best Call Back Number: [phone]  Additional Information:  "

## 2025-02-10 NOTE — TELEPHONE ENCOUNTER
----- Message from Fabiola sent at 2/10/2025  3:32 PM CST -----  Type:  RX Refill Request    Who Called:   Refill or New Rx:rx  RX Name and Strength:pregabalin (LYRICA) 75 MG capsule  insulin aspart U-100 (NOVOLOG) 100 unit/mL (3 mL) InPn pen  semaglutide (OZEMPIC) 0.25 mg or 0.5 mg (2 mg/3 mL) pen injector  apixaban (ELIQUIS) 5 mg Tab  lancets 33 gauge [facility]  Preferred Pharmacy with phone number:[facility]  Local or Mail Order:local  Ordering Provider:ricardo   Would the patient rather a call back or a response via [facility]? call  Best Call Back Number: [phone]  Additional Information:

## 2025-02-11 PROBLEM — R74.01 TRANSAMINITIS: Status: ACTIVE | Noted: 2025-02-11

## 2025-02-11 LAB
ALBUMIN SERPL BCP-MCNC: 3.1 G/DL (ref 3.5–5.2)
ALP SERPL-CCNC: 350 U/L (ref 40–150)
ALT SERPL W/O P-5'-P-CCNC: 28 U/L (ref 10–44)
ANION GAP SERPL CALC-SCNC: 12 MMOL/L (ref 8–16)
AST SERPL-CCNC: 61 U/L (ref 10–40)
BACTERIA #/AREA URNS AUTO: ABNORMAL /HPF
BASOPHILS # BLD AUTO: 0.06 K/UL (ref 0–0.2)
BASOPHILS NFR BLD: 1.3 % (ref 0–1.9)
BILIRUB SERPL-MCNC: 0.5 MG/DL (ref 0.1–1)
BILIRUB UR QL STRIP: NEGATIVE
BNP SERPL-MCNC: 2875 PG/ML (ref 0–99)
BUN SERPL-MCNC: 35 MG/DL (ref 6–20)
CALCIUM SERPL-MCNC: 8.8 MG/DL (ref 8.7–10.5)
CHLORIDE SERPL-SCNC: 101 MMOL/L (ref 95–110)
CLARITY UR REFRACT.AUTO: ABNORMAL
CO2 SERPL-SCNC: 24 MMOL/L (ref 23–29)
COLOR UR AUTO: YELLOW
CREAT SERPL-MCNC: 3.1 MG/DL (ref 0.5–1.4)
DIFFERENTIAL METHOD BLD: ABNORMAL
EOSINOPHIL # BLD AUTO: 0.1 K/UL (ref 0–0.5)
EOSINOPHIL NFR BLD: 2.8 % (ref 0–8)
ERYTHROCYTE [DISTWIDTH] IN BLOOD BY AUTOMATED COUNT: 18 % (ref 11.5–14.5)
ERYTHROCYTE [SEDIMENTATION RATE] IN BLOOD BY PHOTOMETRIC METHOD: >120 MM/HR (ref 0–36)
EST. GFR  (NO RACE VARIABLE): 16.8 ML/MIN/1.73 M^2
ESTIMATED AVG GLUCOSE: 111 MG/DL (ref 68–131)
GLUCOSE SERPL-MCNC: 113 MG/DL (ref 70–110)
GLUCOSE UR QL STRIP: NEGATIVE
HBA1C MFR BLD: 5.5 % (ref 4–5.6)
HCT VFR BLD AUTO: 34.8 % (ref 37–48.5)
HGB BLD-MCNC: 10.9 G/DL (ref 12–16)
HGB UR QL STRIP: ABNORMAL
HYALINE CASTS UR QL AUTO: 0 /LPF
IMM GRANULOCYTES # BLD AUTO: 0.01 K/UL (ref 0–0.04)
IMM GRANULOCYTES NFR BLD AUTO: 0.2 % (ref 0–0.5)
KETONES UR QL STRIP: NEGATIVE
LEUKOCYTE ESTERASE UR QL STRIP: ABNORMAL
LYMPHOCYTES # BLD AUTO: 0.7 K/UL (ref 1–4.8)
LYMPHOCYTES NFR BLD: 14.6 % (ref 18–48)
MAGNESIUM SERPL-MCNC: 2.1 MG/DL (ref 1.6–2.6)
MCH RBC QN AUTO: 30.7 PG (ref 27–31)
MCHC RBC AUTO-ENTMCNC: 31.3 G/DL (ref 32–36)
MCV RBC AUTO: 98 FL (ref 82–98)
MICROSCOPIC COMMENT: ABNORMAL
MONOCYTES # BLD AUTO: 0.8 K/UL (ref 0.3–1)
MONOCYTES NFR BLD: 17.2 % (ref 4–15)
NEUTROPHILS # BLD AUTO: 3 K/UL (ref 1.8–7.7)
NEUTROPHILS NFR BLD: 63.9 % (ref 38–73)
NITRITE UR QL STRIP: NEGATIVE
NRBC BLD-RTO: 0 /100 WBC
OHS QRS DURATION: 100 MS
OHS QTC CALCULATION: 493 MS
PH UR STRIP: 6 [PH] (ref 5–8)
PHOSPHATE SERPL-MCNC: 4.1 MG/DL (ref 2.7–4.5)
PLATELET # BLD AUTO: 259 K/UL (ref 150–450)
PMV BLD AUTO: 10.7 FL (ref 9.2–12.9)
POCT GLUCOSE: 100 MG/DL (ref 70–110)
POCT GLUCOSE: 180 MG/DL (ref 70–110)
POTASSIUM SERPL-SCNC: 3.9 MMOL/L (ref 3.5–5.1)
PROT SERPL-MCNC: 8.8 G/DL (ref 6–8.4)
PROT UR QL STRIP: ABNORMAL
RBC # BLD AUTO: 3.55 M/UL (ref 4–5.4)
RBC #/AREA URNS AUTO: 10 /HPF (ref 0–4)
SODIUM SERPL-SCNC: 137 MMOL/L (ref 136–145)
SP GR UR STRIP: 1.02 (ref 1–1.03)
SQUAMOUS #/AREA URNS AUTO: 2 /HPF
URN SPEC COLLECT METH UR: ABNORMAL
WBC # BLD AUTO: 4.65 K/UL (ref 3.9–12.7)
WBC #/AREA URNS AUTO: >100 /HPF (ref 0–5)

## 2025-02-11 PROCEDURE — 25000003 PHARM REV CODE 250: Mod: HCNC

## 2025-02-11 PROCEDURE — 96375 TX/PRO/DX INJ NEW DRUG ADDON: CPT | Mod: HCNC

## 2025-02-11 PROCEDURE — 87186 SC STD MICRODIL/AGAR DIL: CPT | Mod: HCNC

## 2025-02-11 PROCEDURE — 84100 ASSAY OF PHOSPHORUS: CPT | Mod: HCNC

## 2025-02-11 PROCEDURE — 80053 COMPREHEN METABOLIC PANEL: CPT | Mod: HCNC

## 2025-02-11 PROCEDURE — 96372 THER/PROPH/DIAG INJ SC/IM: CPT

## 2025-02-11 PROCEDURE — 96367 TX/PROPH/DG ADDL SEQ IV INF: CPT | Mod: HCNC

## 2025-02-11 PROCEDURE — 82962 GLUCOSE BLOOD TEST: CPT | Mod: HCNC

## 2025-02-11 PROCEDURE — 87088 URINE BACTERIA CULTURE: CPT | Mod: HCNC

## 2025-02-11 PROCEDURE — 83036 HEMOGLOBIN GLYCOSYLATED A1C: CPT | Mod: HCNC

## 2025-02-11 PROCEDURE — 63600175 PHARM REV CODE 636 W HCPCS: Mod: HCNC

## 2025-02-11 PROCEDURE — 96366 THER/PROPH/DIAG IV INF ADDON: CPT | Mod: HCNC

## 2025-02-11 PROCEDURE — 87086 URINE CULTURE/COLONY COUNT: CPT | Mod: HCNC

## 2025-02-11 PROCEDURE — 83880 ASSAY OF NATRIURETIC PEPTIDE: CPT | Mod: HCNC

## 2025-02-11 PROCEDURE — G0378 HOSPITAL OBSERVATION PER HR: HCPCS | Mod: HCNC

## 2025-02-11 PROCEDURE — 83735 ASSAY OF MAGNESIUM: CPT | Mod: HCNC

## 2025-02-11 PROCEDURE — 85025 COMPLETE CBC W/AUTO DIFF WBC: CPT | Mod: HCNC

## 2025-02-11 PROCEDURE — 99214 OFFICE O/P EST MOD 30 MIN: CPT | Mod: HCNC,,, | Performed by: SURGERY

## 2025-02-11 PROCEDURE — 81001 URINALYSIS AUTO W/SCOPE: CPT | Mod: HCNC

## 2025-02-11 RX ORDER — OXYCODONE HYDROCHLORIDE 5 MG/1
5 TABLET ORAL EVERY 6 HOURS PRN
Status: DISCONTINUED | OUTPATIENT
Start: 2025-02-11 | End: 2025-02-12 | Stop reason: HOSPADM

## 2025-02-11 RX ORDER — PREGABALIN 75 MG/1
75 CAPSULE ORAL DAILY
Status: DISCONTINUED | OUTPATIENT
Start: 2025-02-11 | End: 2025-02-12 | Stop reason: HOSPADM

## 2025-02-11 RX ORDER — TRAZODONE HYDROCHLORIDE 50 MG/1
50 TABLET ORAL NIGHTLY PRN
Status: DISCONTINUED | OUTPATIENT
Start: 2025-02-11 | End: 2025-02-12 | Stop reason: HOSPADM

## 2025-02-11 RX ORDER — CARVEDILOL 3.12 MG/1
3.12 TABLET ORAL 2 TIMES DAILY WITH MEALS
Status: DISCONTINUED | OUTPATIENT
Start: 2025-02-11 | End: 2025-02-12 | Stop reason: HOSPADM

## 2025-02-11 RX ORDER — SEVELAMER CARBONATE 2.4 G/1
2.4 POWDER, FOR SUSPENSION ORAL
Status: DISCONTINUED | OUTPATIENT
Start: 2025-02-11 | End: 2025-02-12 | Stop reason: HOSPADM

## 2025-02-11 RX ORDER — CINACALCET 30 MG/1
30 TABLET, FILM COATED ORAL
Status: DISCONTINUED | OUTPATIENT
Start: 2025-02-12 | End: 2025-02-12 | Stop reason: HOSPADM

## 2025-02-11 RX ORDER — FAMOTIDINE 20 MG/1
20 TABLET, FILM COATED ORAL DAILY
Status: DISCONTINUED | OUTPATIENT
Start: 2025-02-11 | End: 2025-02-12

## 2025-02-11 RX ORDER — ATORVASTATIN CALCIUM 40 MG/1
80 TABLET, FILM COATED ORAL DAILY
Status: DISCONTINUED | OUTPATIENT
Start: 2025-02-11 | End: 2025-02-12 | Stop reason: HOSPADM

## 2025-02-11 RX ORDER — ISOSORBIDE DINITRATE 5 MG/1
5 TABLET ORAL 2 TIMES DAILY
Status: DISCONTINUED | OUTPATIENT
Start: 2025-02-11 | End: 2025-02-12 | Stop reason: HOSPADM

## 2025-02-11 RX ORDER — SODIUM CHLORIDE 9 MG/ML
INJECTION, SOLUTION INTRAVENOUS ONCE
Status: CANCELLED | OUTPATIENT
Start: 2025-02-12

## 2025-02-11 RX ORDER — CARVEDILOL 6.25 MG/1
6.25 TABLET ORAL 2 TIMES DAILY
COMMUNITY

## 2025-02-11 RX ORDER — SODIUM BICARBONATE 650 MG/1
650 TABLET ORAL 3 TIMES DAILY
Status: DISCONTINUED | OUTPATIENT
Start: 2025-02-11 | End: 2025-02-12 | Stop reason: HOSPADM

## 2025-02-11 RX ORDER — OXYCODONE HYDROCHLORIDE 10 MG/1
10 TABLET ORAL EVERY 6 HOURS PRN
Status: DISCONTINUED | OUTPATIENT
Start: 2025-02-11 | End: 2025-02-12 | Stop reason: HOSPADM

## 2025-02-11 RX ORDER — CLOPIDOGREL BISULFATE 75 MG/1
75 TABLET ORAL DAILY
Status: DISCONTINUED | OUTPATIENT
Start: 2025-02-11 | End: 2025-02-12 | Stop reason: HOSPADM

## 2025-02-11 RX ORDER — SERTRALINE HYDROCHLORIDE 100 MG/1
100 TABLET, FILM COATED ORAL NIGHTLY
Status: DISCONTINUED | OUTPATIENT
Start: 2025-02-11 | End: 2025-02-12 | Stop reason: HOSPADM

## 2025-02-11 RX ORDER — SACUBITRIL AND VALSARTAN 24; 26 MG/1; MG/1
1 TABLET, FILM COATED ORAL 2 TIMES DAILY
Status: DISCONTINUED | OUTPATIENT
Start: 2025-02-11 | End: 2025-02-12 | Stop reason: HOSPADM

## 2025-02-11 RX ORDER — CALCITRIOL 0.5 UG/1
0.5 CAPSULE ORAL DAILY
Status: DISCONTINUED | OUTPATIENT
Start: 2025-02-11 | End: 2025-02-12 | Stop reason: HOSPADM

## 2025-02-11 RX ORDER — HYDRALAZINE HYDROCHLORIDE 10 MG/1
10 TABLET, FILM COATED ORAL EVERY 12 HOURS
Status: DISCONTINUED | OUTPATIENT
Start: 2025-02-11 | End: 2025-02-12 | Stop reason: HOSPADM

## 2025-02-11 RX ORDER — INSULIN GLARGINE 100 [IU]/ML
6 INJECTION, SOLUTION SUBCUTANEOUS NIGHTLY
Status: DISCONTINUED | OUTPATIENT
Start: 2025-02-11 | End: 2025-02-12 | Stop reason: HOSPADM

## 2025-02-11 RX ORDER — MEROPENEM AND SODIUM CHLORIDE 500 MG/50ML
500 INJECTION, SOLUTION INTRAVENOUS
Status: DISCONTINUED | OUTPATIENT
Start: 2025-02-11 | End: 2025-02-11 | Stop reason: SDUPTHER

## 2025-02-11 RX ORDER — FUROSEMIDE 40 MG/1
40 TABLET ORAL 2 TIMES DAILY
Status: DISCONTINUED | OUTPATIENT
Start: 2025-02-11 | End: 2025-02-12 | Stop reason: HOSPADM

## 2025-02-11 RX ADMIN — OXYCODONE HYDROCHLORIDE 10 MG: 10 TABLET ORAL at 09:02

## 2025-02-11 RX ADMIN — SACUBITRIL AND VALSARTAN 1 TABLET: 24; 26 TABLET, FILM COATED ORAL at 11:02

## 2025-02-11 RX ADMIN — INSULIN ASPART 2 UNITS: 100 INJECTION, SOLUTION INTRAVENOUS; SUBCUTANEOUS at 09:02

## 2025-02-11 RX ADMIN — TRAZODONE HYDROCHLORIDE 50 MG: 50 TABLET ORAL at 11:02

## 2025-02-11 RX ADMIN — CEFEPIME 1 G: 1 INJECTION, POWDER, FOR SOLUTION INTRAMUSCULAR; INTRAVENOUS at 12:02

## 2025-02-11 RX ADMIN — INSULIN GLARGINE 6 UNITS: 100 INJECTION, SOLUTION SUBCUTANEOUS at 09:02

## 2025-02-11 RX ADMIN — THERA TABS 1 TABLET: TAB at 09:02

## 2025-02-11 RX ADMIN — HYDRALAZINE HYDROCHLORIDE 10 MG: 10 TABLET, FILM COATED ORAL at 08:02

## 2025-02-11 RX ADMIN — ACETAMINOPHEN 650 MG: 325 TABLET ORAL at 08:02

## 2025-02-11 RX ADMIN — MEROPENEM 500 MG: 500 INJECTION INTRAVENOUS at 07:02

## 2025-02-11 RX ADMIN — APIXABAN 5 MG: 5 TABLET, FILM COATED ORAL at 08:02

## 2025-02-11 RX ADMIN — PREGABALIN 75 MG: 75 CAPSULE ORAL at 04:02

## 2025-02-11 RX ADMIN — HYDRALAZINE HYDROCHLORIDE 10 MG: 10 TABLET, FILM COATED ORAL at 09:02

## 2025-02-11 RX ADMIN — CARVEDILOL 3.12 MG: 3.12 TABLET, FILM COATED ORAL at 04:02

## 2025-02-11 RX ADMIN — OXYCODONE HYDROCHLORIDE 10 MG: 10 TABLET ORAL at 04:02

## 2025-02-11 RX ADMIN — INSULIN GLARGINE 6 UNITS: 100 INJECTION, SOLUTION SUBCUTANEOUS at 01:02

## 2025-02-11 RX ADMIN — FAMOTIDINE 20 MG: 20 TABLET, FILM COATED ORAL at 09:02

## 2025-02-11 RX ADMIN — SACUBITRIL AND VALSARTAN 1 TABLET: 24; 26 TABLET, FILM COATED ORAL at 09:02

## 2025-02-11 RX ADMIN — ATORVASTATIN CALCIUM 80 MG: 40 TABLET, FILM COATED ORAL at 09:02

## 2025-02-11 RX ADMIN — SERTRALINE HYDROCHLORIDE 100 MG: 50 TABLET ORAL at 01:02

## 2025-02-11 RX ADMIN — SEVELAMER CARBONATE 2.4 G: 2400 POWDER, FOR SUSPENSION ORAL at 09:02

## 2025-02-11 RX ADMIN — SODIUM BICARBONATE 650 MG TABLET 650 MG: at 04:02

## 2025-02-11 RX ADMIN — FUROSEMIDE 40 MG: 40 TABLET ORAL at 09:02

## 2025-02-11 RX ADMIN — FUROSEMIDE 40 MG: 40 TABLET ORAL at 06:02

## 2025-02-11 RX ADMIN — SODIUM BICARBONATE 650 MG TABLET 650 MG: at 09:02

## 2025-02-11 RX ADMIN — SERTRALINE HYDROCHLORIDE 100 MG: 50 TABLET ORAL at 08:02

## 2025-02-11 RX ADMIN — SEVELAMER CARBONATE 2.4 G: 2400 POWDER, FOR SUSPENSION ORAL at 01:02

## 2025-02-11 RX ADMIN — CLOPIDOGREL BISULFATE 75 MG: 75 TABLET ORAL at 09:02

## 2025-02-11 RX ADMIN — SODIUM BICARBONATE 650 MG TABLET 650 MG: at 08:02

## 2025-02-11 RX ADMIN — CALCITRIOL 0.5 MCG: 0.5 CAPSULE, LIQUID FILLED ORAL at 09:02

## 2025-02-11 RX ADMIN — CARVEDILOL 3.12 MG: 3.12 TABLET, FILM COATED ORAL at 09:02

## 2025-02-11 RX ADMIN — APIXABAN 5 MG: 5 TABLET, FILM COATED ORAL at 09:02

## 2025-02-11 RX ADMIN — ISOSORBIDE DINITRATE 5 MG: 5 TABLET ORAL at 09:02

## 2025-02-11 NOTE — ASSESSMENT & PLAN NOTE
S/P CABG x 1 (1/14/2020),  S/P PCI of mid circumflex 80-85% stenosis with drug-eluting stent x1 (7/28/2020)  - continue apixaban 5 mg BID and clopidogrel  - continue atorvastatin 80 mg   - monitor on telemetry.
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
## 2025-02-11 NOTE — ASSESSMENT & PLAN NOTE
Anemia is likely due to chronic disease due to ESRD. Most recent hemoglobin and hematocrit are listed below.  Recent Labs     02/10/25  2139   HGB 10.5*   HCT 32.7*     Plan  - Monitor serial CBC: Daily  - Transfuse PRBC if patient becomes hemodynamically unstable, symptomatic or H/H drops below 7/21.  - Patient has not received any PRBC transfusions to date  - Patient's anemia is currently stable

## 2025-02-11 NOTE — ASSESSMENT & PLAN NOTE
58 yo F with PMHx of ESRD on HD (MWF), T2DM, severe PAD s/p bilateral AKA c/b poor healing, L common iliac stent, CAD s/p CABG, HFrEF (EF 35-40%, recovered from 15%), HLD, HTN, obesity, pAF, depression, CAROLIN, mod-sev TR who presented for severe LLE pain for one day. On exam, her LLE is swollen, ttp, and yellow drainage is present to wound bed. Afebrile and without leukocytosis. ESR >120 and CRP 21. Wound vac was removed today per home health wound care nurse.     - started on IV vancomycin in ED, will continue and start cefepime as well   - Vascular surgery consulted; appreciate recs  - DVT US pending   - XR L femur pending   - MM pain control  - wound care consulted

## 2025-02-11 NOTE — NURSING
Assumed care for pt after recieving report from previous RN. Pt resting in bed in NAD, RR e/u. Vital signs stable and WDL at this time of assessment.     LOC: The patient is awake, alert, and oriented to self, place, time, and situation. Pt is calm and cooperative. Affect is appropriate.  Speech is appropriate and clear.      APPEARANCE: Patient resting comfortably, in no acute distress.  Patient is clean and well groomed.     SKIN: The skin is warm and dry; color consistent with ethnicity.  Patient has normal skin turgor and moist mucus membranes. Wound to LLE s/p AKA.      MUSCULOSKELETAL: Patient moving upper  extremities without difficulty; Denies weakness.      RESPIRATORY: Airway is open and patent. Respirations spontaneous, even, easy, and non-labored.  Patient has a normal effort and rate.  No accessory muscle use noted. Denies cough.      CARDIAC:  No peripheral edema noted. No complaints of chest pain. Cardiac hx noted.      ABDOMEN: Soft and non tender to palpation.  No distention noted. Pt denies abdominal pain; denies nausea, vomiting, diarrhea, or constipation.     NEUROLOGIC: Eyes open spontaneously.  Behavior appropriate to situation.  Follows commands; facial expression symmetrical.  Purposeful motor response noted; normal sensation in all extremities. Pt denies headache; denies lightheadedness or dizziness; denies visual disturbances; denies loss of balance; denies unilateral weakness.    Main Complaint: Cellulitis of LLE
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
## 2025-02-11 NOTE — ASSESSMENT & PLAN NOTE
Hypocalcemia is likely due to ESRD related to vitamin D disorder. The most recent calcium and albumin results listed below.  Recent Labs     02/10/25  2139   CALCIUM 8.4*   ALBUMIN 3.0*     Plan  - Will monitor electrolytes closely and replace as indicated  - The patient's hypocalcemia is chronic, stable

## 2025-02-11 NOTE — ASSESSMENT & PLAN NOTE
Patient's FSGs are controlled on current hypoglycemics.   Last A1c reviewed-   Lab Results   Component Value Date    HGBA1C 5.9 (H) 10/25/2024     Most recent fingerstick glucose reviewed-   Recent Labs   Lab 02/11/25  0108   POCTGLUCOSE 180*     Current correctional scale  Low  Maintain anti-hyperglycemic dose as follows-   Antihyperglycemics (From admission, onward)      Start     Stop Route Frequency Ordered    02/11/25 0030  insulin glargine U-100 (Lantus) pen 6 Units         -- SubQ Nightly 02/11/25 0021    02/11/25 0029  insulin aspart U-100 pen 0-10 Units         -- SubQ Before meals & nightly PRN 02/10/25 2331           - hold oral antihyperglycemics  - diabetic/renal diet

## 2025-02-11 NOTE — PHARMACY MED REC
"  Admission Medication History     The home medication history was taken by Shahnaz Greenfield.    You may go to "Admission" then "Reconcile Home Medications" tabs to review and/or act upon these items.     The home medication list has been updated by the Pharmacy department.   Please read ALL comments highlighted in yellow.   Please address this information as you see fit.    Feel free to contact us if you have any questions or require assistance.      Medications listed below were obtained from: Spouse & Med Rec completed on 1/25/25  Current Outpatient Medications on File Prior to Encounter   Medication Sig    carvediloL (COREG) 6.25 MG tablet Take 6.25 mg by mouth 2 (two) times daily.    acetaminophen (TYLENOL) 325 MG tablet Take 2 tablets (650 mg total) by mouth every 8 (eight) hours as needed for Pain.    allopurinoL (ZYLOPRIM) 100 MG tablet Take 1/2 tablet(50 mg) by mouth every other day    apixaban (ELIQUIS) 5 mg Tab Take 1 tablet (5 mg total) by mouth 2 (two) times daily.    atorvastatin (LIPITOR) 80 MG tablet Take 1 tablet (80 mg total) by mouth once daily.    blood sugar diagnostic Strp 1 each by Misc.(Non-Drug; Combo Route) route 4 (four) times daily before meals and nightly.    blood-glucose meter Misc Before meals and at bedtime    blood-glucose sensor (DEXCOM G7 SENSOR) Radha 1 each by Misc.(Non-Drug; Combo Route) route once daily.    calcitRIOL (ROCALTROL) 0.5 MCG Cap Take 1 capsule (0.5 mcg total) by mouth once daily.    carvediloL (COREG) 3.125 MG tablet Take 1 tablet (3.125 mg total) by mouth 2 (two) times daily with meals. (Patient not taking: Reported on 2/11/2025)    cinacalcet (SENSIPAR) 30 MG Tab Take 1 tablet (30 mg total) by mouth every Mon, Wed, Fri.    clopidogreL (PLAVIX) 75 mg tablet Take 1 tablet (75 mg total) by mouth once daily.    famotidine (PEPCID) 20 MG tablet TAKE 1 TABLET(20 MG) BY MOUTH TWICE DAILY    furosemide (LASIX) 40 MG tablet Take 1 tablet (40 mg total) by mouth 2 (two) times " "daily.    hydrALAZINE (APRESOLINE) 10 MG tablet Take 1 tablet (10 mg total) by mouth every 12 (twelve) hours.    insulin aspart U-100 (NOVOLOG) 100 unit/mL (3 mL) InPn pen Inject 5 Units into the skin before meals and at bedtime as needed (Hyperglycemia). **MODERATE CORRECTION DOSE**  Blood Glucose  mg/dL                  Pre-meal                2200  151-200                2 units                    1 unit  201-250                4 units                    2 units  251-300                6 units                    3 units  301-350                8 units                    4 units  >350                     10 units                  5 units  Administer subcutaneously if needed at times designated by monitoring  schedule.    insulin glargine U-100, Lantus, 100 unit/mL (3 mL) SubQ InPn pen Inject 5 Units into the skin every evening. (Patient taking differently: Inject 8 Units into the skin every evening.)    isosorbide dinitrate (ISORDIL) 10 MG tablet Take 0.5 tablets (5 mg total) by mouth 2 (two) times daily.    lancets 33 gauge Misc TEST 3 TIMES DAILY    multivit with min-folic acid (ONE DAILY WOMENS 50 PLUS) 0.4 mg Tab Take 1 tablet by mouth once daily.    mupirocin (BACTROBAN) 2 % ointment Apply topically 2 (two) times daily.    pen needle, diabetic 33 gauge x 5/32" Ndle 1 each by Misc.(Non-Drug; Combo Route) route 4 (four) times daily before meals and nightly.    pregabalin (LYRICA) 75 MG capsule Take 1 capsule (75 mg total) by mouth Daily.    RENVELA 2.4 gram PwPk Take 1 packet by mouth 3 (three) times daily with meals.    sacubitriL-valsartan (ENTRESTO) 24-26 mg per tablet Take 1 tablet by mouth 2 (two) times daily.    semaglutide (OZEMPIC) 0.25 mg or 0.5 mg (2 mg/3 mL) pen injector Inject 0.5 mg into the skin every 7 days.    sertraline (ZOLOFT) 100 MG tablet Take 1 tablet (100 mg total) by mouth every evening.    sodium bicarbonate 650 MG tablet Take 650 mg by mouth 3 (three) times daily.    traZODone " (DESYREL) 50 MG tablet Take 50 mg by mouth nightly as needed for Insomnia.    zinc oxide 10 % Oint Apply 1 Application topically 2 (two) times a day.    [DISCONTINUED] lancing device Misc 1 Device by Misc.(Non-Drug; Combo Route) route 2 (two) times daily with meals.             Shahnaz Greenfield  [id number]                .

## 2025-02-11 NOTE — ASSESSMENT & PLAN NOTE
S/P AKA (above knee amputation) bilateral   Thrombosis of femoro-popliteal bypass graft   s/p Aortogram and BLE angiogram with left common iliac stent placement and left AKA stump debridement in Nov 2024    - Vascular surgery consult, appreciate assistance  - continue apixaban, plavix, and statin therapy   - DVT US pending

## 2025-02-11 NOTE — PROGRESS NOTES
"Pharmacokinetic Initial Assessment: IV Vancomycin    Assessment/Plan:    Initiate intravenous vancomycin with loading dose of 1000 mg once, done in ED, with subsequent doses when random concentrations are less than 20 mcg/mL.  Desired empiric serum trough concentration is 10 to 20 mcg/mL  Draw vancomycin random level with AM labs on 02/12/2025.  Pharmacy will continue to follow and monitor vancomycin.      Please contact pharmacy at extension [phone] with any questions regarding this assessment.     Thank you for the consult,   Akash Pagan       Patient brief summary:  Suyapa Connelly is a 59 y.o. female initiated on antimicrobial therapy with IV Vancomycin for treatment of suspected skin & soft tissue infection.    Drug Allergies:   Review of patient's allergies indicates:   Allergen Reactions    Ciprofloxacin Itching    Pcn [penicillins]      Rash; tolerated ceftriaxone on 1/13/20  Tolerating Augmentin November 2024       Actual Body Weight:   47.6 kg    Renal Function:   Estimated Creatinine Clearance: 13.3 mL/min (A) (based on SCr of 2.8 mg/dL (H)).    Dialysis Method (if applicable):  intermittent HD    CBC (last 72 hours):  Recent Labs   Lab Result Units 02/10/25  2139   WBC K/uL 5.29   Hemoglobin g/dL 10.5*   Hematocrit % 32.7*   Platelets K/uL 252   Gran % % 74.0*   Lymph % % 9.1*   Mono % % 14.0   Eosinophil % % 1.9   Basophil % % 0.6   Differential Method  Automated       Metabolic Panel (last 72 hours):  Recent Labs   Lab Result Units 02/10/25  2139   Sodium mmol/L 138   Potassium mmol/L 3.9   Chloride mmol/L 101   CO2 mmol/L 23   Glucose mg/dL 229*   BUN mg/dL 31*   Creatinine mg/dL 2.8*   Albumin g/dL 3.0*   Total Bilirubin mg/dL 0.5   Alkaline Phosphatase U/L 368*   AST U/L 57*   ALT U/L 25   Magnesium mg/dL 1.9       Drug levels (last 3 results):  No results for input(s): "VANCOMYCINRA", "VANCORANDOM", "VANCOMYCINPE", "VANCOPEAK", "VANCOMYCINTR", "VANCOTROUGH" in the last 72 " hours.    Microbiologic Results:  Microbiology Results (last 7 days)       ** No results found for the last 168 hours. **
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
## 2025-02-11 NOTE — ASSESSMENT & PLAN NOTE
- elevated on admit, chronically elevated on chart review  - denies abd pain, n/v  - monitor with daily cmp

## 2025-02-11 NOTE — CONSULTS
"[facility] - [facility]  Vascular Surgery  Consult Note    Inpatient consult to Vascular Surgery  Consult performed by: Roberto Baez MD  Consult ordered by: Gracy Pinzon PA-C        Subjective:     Chief Complaint/Reason for Admission: Cellulitis of left lower extremity     History of Present Illness: 59F PMH ESRD on HD MWF, DM2, PAD s/p BLE AKA with poor wound healing at LLE AKA stump, L common iliac stent, CAD s/p CABG, HFrEF 35-40%, HLD, HTN, Afib, CHF, obesity, pAF, depression, CAROLIN. Presents with cc of LLE pain described as an intermittent, 10/10, sharp, shooting pain, localized to LLE AKA stump. Pain began yesterday, 2/10/2025. Wound at LLE AKA stump recently treated for cellulitis via abx (11/2024), sent home with outpatient wound care follow up and a wound vac which was removed yesterday. Wound appears to have improved with wound vac, but pt notes edema and drainage noted yesterday. Pt also reports sob and orthopnea since yesterday. Pt denies any current f/c/n/v/cp. In the ED, AF, VSS, CMP consistent with ESRD, glucose 229, CRP 21 and elevated liver enzymes. Started on IV vancomycin. Placed in observation with HM. Xrays L femur and AP chest unremarkable for any change, venous US LLE shows nonocclusive superficial venous thrombosis involving the L greater saphenous vein and soft tissue edema.    Vascular surgery consulted for "nonhealing wound to LLE. Recent common iliac stent placement".     (Not in a hospital admission)      Review of patient's allergies indicates:   Allergen Reactions    Ciprofloxacin Itching    Pcn [penicillins]      Rash; tolerated ceftriaxone on 1/13/20  Tolerating Augmentin November 2024       Past Medical History:   Diagnosis Date    Anxiety     Chronic pain syndrome     CKD (chronic kidney disease), stage III     CVD (cardiovascular disease)     Depression     Diabetes mellitus, type 2     GERD (gastroesophageal reflux disease)     Hyperemesis 03/23/2021    Hypokalemia " 03/23/2021    Infection of below knee amputation stump 03/12/2022    Osteomyelitis     Osteomyelitis of left foot 04/30/2021    Ulcer of left foot     Vaginal delivery     x1     Past Surgical History:   Procedure Laterality Date    ABOVE-KNEE AMPUTATION Left 5/18/2021    Procedure: AMPUTATION, ABOVE KNEE;  Surgeon: Teddy Huber MD;  Location: [address];  Service: Vascular;  Laterality: Left;    ABOVE-KNEE AMPUTATION Right 3/18/2022    Procedure: AMPUTATION, ABOVE KNEE;  Surgeon: DAYNE Florez II, MD;  Location: [address];  Service: Vascular;  Laterality: Right;    Angiogram - Right Extremity Right 7/9/15    ANGIOGRAM, EXTREMITY, UNILATERAL Left 11/22/2024    Procedure: ANGIOGRAM, EXTREMITY, UNILATERAL;  Surgeon: Yony Lindsey MD;  Location: [address];  Service: Vascular;  Laterality: Left;  Left leg angio, L CFA approach  5.4 min  597.31 mGy  73.8959 Gycm2  20 ml dye    angiogram-left leg  10/6/15    ANGIOGRAPHY OF LOWER EXTREMITY Left 4/29/2021    Procedure: ANGIOGRAM, LOWER EXTREMITY;  Surgeon: Teddy Huber MD;  Location: [facility] OR [address] [place] [address];  Service: Vascular;  Laterality: Left;    BELOW KNEE AMPUTATION OF LOWER EXTREMITY Right 12/28/2021    Procedure: AMPUTATION, BELOW KNEE;  Surgeon: Kaitlyn Rojas MD;  Location: [facility];  Service: General;  Laterality: Right;    CATHETERIZATION OF BOTH LEFT AND RIGHT HEART N/A 12/18/2019    Procedure: CATHETERIZATION, HEART, BOTH LEFT AND RIGHT;  Surgeon: Que Fernando III, MD;  Location: [facility] CATH LAB;  Service: Cardiology;  Laterality: N/A;    CORONARY ANGIOGRAPHY N/A 12/18/2019    Procedure: ANGIOGRAM, CORONARY ARTERY;  Surgeon: Que Fernando III, MD;  Location: [facility] CATH LAB;  Service: Cardiology;  Laterality: N/A;    CORONARY ANGIOGRAPHY INCLUDING BYPASS GRAFTS WITH CATHETERIZATION OF LEFT HEART N/A 7/28/2020    Procedure: ANGIOGRAM, CORONARY, INCLUDING BYPASS GRAFT, WITH LEFT HEART CATHETERIZATION, 9 am;   Surgeon: Rachel Easley MD;  Location: [facility] CATH LAB;  Service: Cardiology;  Laterality: N/A;    CORONARY ARTERY BYPASS GRAFT (CABG) N/A 1/14/2020    Procedure: CORONARY ARTERY BYPASS GRAFT (CABG) x 1     Off Pump;  Surgeon: Huang Altamirano MD;  Location: [facility] OR [address] [place] [address];  Service: Cardiovascular;  Laterality: N/A;    CREATION OF FEMORAL-TIBIAL ARTERY BYPASS Left 4/29/2021    Procedure: CREATION, BYPASS, ARTERIAL, FEMORAL TO ANTERIOR TIBIAL;  Surgeon: Teddy Huber MD;  Location: [facility] OR [address] [place] [address];  Service: Vascular;  Laterality: Left;    CREATION OF FEMOROPOPLITEAL ARTERIAL BYPASS USING GRAFT Left 8/18/2020    Procedure: CREATION, BYPASS, ARTERIAL, FEMORAL TO POPLITEAL, USING GRAFT, LEFT LOWER EXTREMITY;  Surgeon: Teddy Huber MD;  Location: [facility] OR;  Service: Vascular;  Laterality: Left;  REQUEST 7:15 A.M. START----COVID NEGATIVE ON 8/17 1ST CASE STARTE PER LEANA ON 8/7/2020 @ 942AM-  RN PREOP 8/12/2020   T/S-----CLEARED BY CARDS-------PENDING INSURANCE    DEBRIDEMENT OF FOOT Left 9/8/2020    Procedure: DEBRIDEMENT, FOOT;  Surgeon: Rosio Mayes DPM;  Location: [facility];  Service: Podiatry;  Laterality: Left;  request neoxx .   RN Pre Op 9-4-2020, Covid negative on 9/5/20. C A    DEBRIDEMENT OF FOOT  3/4/2021    Procedure: DEBRIDEMENT, FOOT;  Surgeon: Teddy Huber MD;  Location: [facility] OR;  Service: Vascular;;    DEBRIDEMENT OF FOOT Left 3/9/2021    Procedure: DEBRIDEMENT, FOOT, bone biopsy;  Surgeon: Rosio Mayes DPM;  Location: [facility] OR;  Service: Podiatry;  Laterality: Left;  Request neoxx---COVID IN AM  REQUESTING NOON START  RN Phone Pre op.On Blood thinners Plavix and Eliquis.  Covid am of surgery. C A    DEBRIDEMENT OF FOOT Left 5/4/2021    Procedure: DEBRIDEMENT, FOOT;  Surgeon: Farooq Morley DPM;  Location: [address];  Service: Podiatry;  Laterality: Left;    ESOPHAGOGASTRODUODENOSCOPY N/A 11/7/2024    Procedure: EGD (ESOPHAGOGASTRODUODENOSCOPY);  Surgeon:  Yony Cancino MD;  Location: [facility] ENDO;  Service: Endoscopy;  Laterality: N/A;    INSERTION OF TUNNELED CENTRAL VENOUS HEMODIALYSIS CATHETER N/A 1/27/2020    Procedure: Insertion, Catheter, Central Venous, Hemodialysis;  Surgeon: ESTEBAN Gomez III, MD;  Location: [facility] CATH LAB;  Service: Peripheral Vascular;  Laterality: N/A;    INSERTION OF TUNNELED CENTRAL VENOUS HEMODIALYSIS CATHETER  5/10/2023    Procedure: Insertion, Catheter, Central Venous, Hemodialysis;  Surgeon: Romulo Queen MD;  Location: [facility] CATH LAB;  Service: Cardiology;;    IRRIGATION AND DEBRIDEMENT OF LOWER EXTREMITY Left 11/22/2024    Procedure: IRRIGATION AND DEBRIDEMENT, LOWER EXTREMITY;  Surgeon: Yony Lindsey MD;  Location: [facility] OR [facility] FLR;  Service: Vascular;  Laterality: Left;    MAGNETIC RESONANCE IMAGING Left 11/19/2024    Procedure: MRI (Magnetic Resonance Imagine);  Surgeon: Sophia Hernandez;  Location: [facility] SOPHIA;  Service: Anesthesiology;  Laterality: Left;    PERCUTANEOUS TRANSLUMINAL ANGIOPLASTY N/A 3/4/2021    Procedure: PTA (ANGIOPLASTY, PERCUTANEOUS, TRANSLUMINAL);  Surgeon: Teddy Huber MD;  Location: [facility] OR;  Service: Vascular;  Laterality: N/A;    REMOVAL OF ARTERIOVENOUS GRAFT Left 5/27/2021    Procedure: REMOVAL, GRAFT, LEFT LOWER EXTREMITY, WOUND EXPLORATION;  Surgeon: Teddy Huber MD;  Location: [facility] 2ND FLR;  Service: Vascular;  Laterality: Left;    REMOVAL OF NAIL OF DIGIT Left 3/9/2021    Procedure: REMOVAL, NAIL, DIGIT;  Surgeon: Rosio Mayes DPM;  Location: [facility] OR;  Service: Podiatry;  Laterality: Left;    RIGHT HEART CATHETERIZATION Right 5/10/2023    Procedure: INSERTION, CATHETER, RIGHT HEART;  Surgeon: Romulo Queen MD;  Location: [facility] CATH LAB;  Service: Cardiology;  Laterality: Right;    STENT, ILIAC ARTERY Left 11/22/2024    Procedure: STENT, ILIAC ARTERY;  Surgeon: Yony Lindsey MD;  Location: [facility] OR [facility] FLR;  Service: Vascular;   Laterality: Left;    THROMBECTOMY Left 3/4/2021    Procedure: THROMBECTOMY, LEFT LOWER EXTREMITY BYPASS GRAFT, ANGIOGRAM, POSSIBLE INTERVENTION, POSSIBLE LEFT LOWER EXTREMITY BYPASS;  Surgeon: Teddy Huber MD;  Location: [facility] OR;  Service: Vascular;  Laterality: Left;    THROMBECTOMY Left 4/29/2021    Procedure: GRAFT THROMBECTOMY, LEFT LOWER EXTREMITY;  Surgeon: Teddy Huber MD;  Location: [facility] OR 2ND FLR;  Service: Vascular;  Laterality: Left;  14.5 min  1179.85 mGy  341.01 Gycm2  240 ml dye    THROMBECTOMY  10/22/2021    Procedure: THROMBECTOMY;  Surgeon: Saad Arenas MD;  Location: [facility] CATH LAB/EP;  Service: Cardiology;;     Family History       Problem Relation (Age of Onset)    Diabetes Mother, Father, Paternal Grandmother    Heart disease Maternal Grandmother    No Known Problems Maternal Grandfather, Paternal Grandfather          Tobacco Use    Smoking status: Former     Passive exposure: Never    Smokeless tobacco: Never   Substance and Sexual Activity    Alcohol use: No    Drug use: Yes     Types: Marijuana     Comment: occassional    Sexual activity: Yes     Partners: Male     Review of Systems   Constitutional: Negative.    Respiratory:  Positive for shortness of breath.    Cardiovascular:  Positive for leg swelling.   Gastrointestinal: Negative.    Genitourinary: Negative.    Musculoskeletal:  Positive for myalgias.   Skin:  Positive for wound.   Neurological: Negative.    Psychiatric/Behavioral: Negative.       Objective:     Vital Signs (Most Recent):  Temp: 96.7 °F (35.9 °C) (02/11/25 0515)  Pulse: 98 (02/11/25 0515)  Resp: 18 (02/11/25 0515)  BP: 126/62 (02/11/25 0515)  SpO2: 100 % (02/11/25 0515) Vital Signs (24h Range):  Temp:  [96.7 °F (35.9 °C)-98.2 °F (36.8 °C)] 96.7 °F (35.9 °C)  Pulse:  [75-98] 98  Resp:  [18] 18  SpO2:  [95 %-100 %] 100 %  BP: (126-140)/(60-62) 126/62     Weight: 47.6 kg (104 lb 15 oz)  Body mass index is 32.02 kg/m².      Physical Exam  Constitutional:   "     Appearance: She is obese. She is not ill-appearing.   HENT:      Head: Normocephalic.   Cardiovascular:      Rate and Rhythm: Normal rate.      Pulses:           Femoral pulses are 0 on the left side.     Comments: L femoral detected with doppler at bedside, biphasic  Pulmonary:      Effort: Pulmonary effort is normal.   Abdominal:      Tenderness: There is no abdominal tenderness. There is no guarding.   Musculoskeletal:         General: Tenderness present.      Left lower leg: Edema present.   Skin:     General: Skin is warm.   Neurological:      General: No focal deficit present.      Mental Status: She is oriented to person, place, and time. Mental status is at baseline.   Psychiatric:         Mood and Affect: Mood normal.         Behavior: Behavior normal.          Significant Labs:  Cardiac markers: No results for input(s): "CKMB", "CPKMB", "TROPONINT", "TROPONINI", "MYOGLOBIN" in the last 48 hours.  CBC:   Recent Labs   Lab 02/11/25  0404   WBC 4.65   RBC 3.55*   HGB 10.9*   HCT 34.8*      MCV 98   MCH 30.7   MCHC 31.3*     CMP:   Recent Labs   Lab 02/11/25  0404   *   CALCIUM 8.8   ALBUMIN 3.1*   PROT 8.8*      K 3.9   CO2 24      BUN 35*   CREATININE 3.1*   ALKPHOS 350*   ALT 28   AST 61*   BILITOT 0.5     Coagulation: No results for input(s): "LABPROT", "INR", "APTT" in the last 48 hours.  eGFR: No results for input(s): "EGFRIFAFRICA", "EGFRCYSTC", "LABGLOM" in the last 48 hours.    Invalid input(s): "EGFRNON-AA"    Significant Diagnostics:  I have reviewed all pertinent imaging results/findings within the past 24 hours.    Assessment/Plan:     Peripheral vascular disease  59F PMH ESRD on HD MWF, DM2, PAD s/p BLE AKA with poor wound healing at E AKA stump, L common iliac stent, CAD s/p CABG, HFrEF 35-40%, HLD, HTN, Afib, CHF, obesity, pAF, depression, CAROLIN. Presents with LLE pain and swelling. Wound at E AKA stump recently treated for cellulitis via abx (11/2024), sent " "home with outpatient wound care follow up and a wound vac which was removed yesterday. Wound appears to have improved with wound vac, but pt notes edema and drainage noted yesterday. Pt also reports sob and orthopnea since yesterday. Pt denies any current f/c/n/v/cp. Xrays L femur and AP chest unremarkable for any change, venous US LLE shows nonocclusive superficial venous thrombosis involving the L greater saphenous vein and soft tissue edema. Vascular surgery consulted for "nonhealing wound to LLE. Recent common iliac stent placement".     - on physical exam no arterial occlusion to LLE, noted improvement to LLE wound from previous  - no surgical intervention is indicated at this time from vascular surgery perspective  - LLE wound re dressed with ABD pad secured with tape  - vascular surgery will sign off, please re consult with any further questions or concerns, thank you        Thank you for your consult. I will sign off. Please contact us if you have any additional questions.    Roberto Baez MD  Vascular Surgery  [facility] Dept  "

## 2025-02-11 NOTE — PLAN OF CARE
[facility] - [facility] Dept  Initial Discharge Assessment       Primary Care Provider: Vanessa Noel MD    Admission Diagnosis: Cellulitis [L03.90]    Admission Date: 2/10/2025    Pt requires assistance with their ADL's. Pt uses a wheelchair and power chair to assist with their ambulation, both of pt legs are amputated.     Post acute was discussed with pt. Pt currently receiving home health services with [facility]. Pt plans to resume services. Pt spouse Randell to provide transportation home.     Pt is on dialysis:  [facility]  MWF  2:00 PM    Expected Discharge Date: 2/11/2025    Transition of Care Barriers: (P) None    Payor: HU[employer] MANAGED MEDICARE / Plan: [employer] MEDICARE SELECT PARTNER / Product Type: Medicare Advantage /     Extended Emergency Contact Information  Primary Emergency Contact: Randell Connelly  Address: [address] [place] [address] [facility]  Home Phone: [phone]  Mobile Phone: [phone]  Relation: Spouse  Secondary Emergency Contact: Cornelia Connelly  Address: [address] [place] [address] [facility]  Home Phone: [phone]  Relation: Daughter    Discharge Plan A: (P) Home with family, Home Health  Discharge Plan B: (P) Home Health, Home with family      [facility]  [address] [facility] [address]  [facility] [address]  Phone: [phone] Fax: [phone]      Initial Assessment (most recent)       Adult Discharge Assessment - 02/11/25 1024          Discharge Assessment    Assessment Type Discharge Planning Assessment     Confirmed/corrected address, phone number and insurance Yes     Confirmed Demographics Correct on Facesheet     Source of Information patient     Does patient/caregiver understand observation status Yes     Reason For Admission Cellulitis of left lower extremity     People in Home spouse     Do you expect to return to your current living situation? Yes     Do you have help at  home or someone to help you manage your care at home? No     Prior to hospitilization cognitive status: Alert/Oriented     Current cognitive status: Alert/Oriented     Walking or Climbing Stairs Difficulty yes     Walking or Climbing Stairs ambulation difficulty, assistance 1 person;ambulation difficulty, requires equipment;stair climbing difficulty, requires equipment;stair climbing difficulty, assistance 1 person;transferring difficulty, requires equipment;transferring difficulty, assistance 1 person     Mobility Management Wheel chair, power scooter     Equipment Currently Used at Home power chair;wheelchair     Readmission within 30 days? Yes     Patient currently being followed by outpatient case management? No     Do you currently have service(s) that help you manage your care at home? Yes     Name and Contact number of agency [facility] (P)      Is the pt/caregiver preference to resume services with current agency Yes     Do you take prescription medications? Yes     Do you have prescription coverage? Yes     Coverage Payor: HUMANA MANAGED MEDICARE - [employer] MEDICARE SELECT PARTNER - CAPITATED     Do you have any problems affording any of your prescribed medications? No (P)      Is the patient taking medications as prescribed? yes (P)      Who is going to help you get home at discharge? family/friend (P)      How do you get to doctors appointments? family or friend will provide (P)      Are you on dialysis? Yes (P)      Dialysis Name and Scheduled days [facility], 2:00 pm (P)      Discharge Plan A Home with family;Home Health (P)      Discharge Plan B Home Health;Home with family (P)      DME Needed Upon Discharge  none (P)      Discharge Plan discussed with: Patient (P)      Transition of Care Barriers None (P)         Physical Activity    On average, how many days per week do you engage in moderate to strenuous exercise (like a brisk walk)? 0 days (P)      On average, how many minutes do you engage in  exercise at this level? 0 min (P)         Financial Resource Strain    How hard is it for you to pay for the very basics like food, housing, medical care, and heating? Not very hard (P)         Housing Stability    In the last 12 months, was there a time when you were not able to pay the mortgage or rent on time? No (P)      At any time in the past 12 months, were you homeless or living in a shelter (including now)? No (P)         Transportation Needs    Has the lack of transportation kept you from medical appointments, meetings, work or from getting things needed for daily living? No (P)         Food Insecurity    Within the past 12 months, you worried that your food would run out before you got the money to buy more. Never true (P)      Within the past 12 months, the food you bought just didn't last and you didn't have money to get more. Never true (P)         Stress    Do you feel stress - tense, restless, nervous, or anxious, or unable to sleep at night because your mind is troubled all the time - these days? Only a little (P)         Social Isolation    How often do you feel lonely or isolated from those around you?  Never (P)         Alcohol Use    Q1: How often do you have a drink containing alcohol? Never (P)      Q2: How many drinks containing alcohol do you have on a typical day when you are drinking? Patient does not drink (P)      Q3: How often do you have six or more drinks on one occasion? Never (P)         Utilities    In the past 12 months has the electric, gas, oil, or water company threatened to shut off services in your home? No (P)         Health Literacy    How often do you need to have someone help you when you read instructions, pamphlets, or other written material from your doctor or pharmacy? Never (P)         OTHER    Name(s) of People in Home Spouse (P)                  JENELLE Connors, ZACHARYW.   Case Management  [facility]  Email: [email or web address]

## 2025-02-11 NOTE — ED NOTES
[facility]  [facility] date:   Expected time:   Means of arrival:   Comments:  JERMAIN Hearn

## 2025-02-11 NOTE — PROGRESS NOTES
Pharmacokinetic Assessment Follow Up: IV Vancomycin    Vancomycin discontinued.  Pharmacy will sign off as consult.  Please re-order consult if vancomycin reinitiated.    Thank you for the consult,   Akash Pagan

## 2025-02-11 NOTE — SUBJECTIVE & OBJECTIVE
Past Medical History:   Diagnosis Date    Anxiety     Chronic pain syndrome     CKD (chronic kidney disease), stage III     CVD (cardiovascular disease)     Depression     Diabetes mellitus, type 2     GERD (gastroesophageal reflux disease)     Hyperemesis 03/23/2021    Hypokalemia 03/23/2021    Infection of below knee amputation stump 03/12/2022    Osteomyelitis     Osteomyelitis of left foot 04/30/2021    Ulcer of left foot     Vaginal delivery     x1       Past Surgical History:   Procedure Laterality Date    ABOVE-KNEE AMPUTATION Left 5/18/2021    Procedure: AMPUTATION, ABOVE KNEE;  Surgeon: Teddy Huber MD;  Location: [address];  Service: Vascular;  Laterality: Left;    ABOVE-KNEE AMPUTATION Right 3/18/2022    Procedure: AMPUTATION, ABOVE KNEE;  Surgeon: DAYNE Florez II, MD;  Location: [address];  Service: Vascular;  Laterality: Right;    Angiogram - Right Extremity Right 7/9/15    ANGIOGRAM, EXTREMITY, UNILATERAL Left 11/22/2024    Procedure: ANGIOGRAM, EXTREMITY, UNILATERAL;  Surgeon: Yony Lindsey MD;  Location: [address];  Service: Vascular;  Laterality: Left;  Left leg angio, L CFA approach  5.4 min  597.31 mGy  73.8959 Gycm2  20 ml dye    angiogram-left leg  10/6/15    ANGIOGRAPHY OF LOWER EXTREMITY Left 4/29/2021    Procedure: ANGIOGRAM, LOWER EXTREMITY;  Surgeon: Teddy Huber MD;  Location: [address];  Service: Vascular;  Laterality: Left;    BELOW KNEE AMPUTATION OF LOWER EXTREMITY Right 12/28/2021    Procedure: AMPUTATION, BELOW KNEE;  Surgeon: Kaitlyn Rojas MD;  Location: [facility] OR;  Service: General;  Laterality: Right;    CATHETERIZATION OF BOTH LEFT AND RIGHT HEART N/A 12/18/2019    Procedure: CATHETERIZATION, HEART, BOTH LEFT AND RIGHT;  Surgeon: Que Fernando III, MD;  Location: [facility] CATH LAB;  Service: Cardiology;  Laterality: N/A;    CORONARY ANGIOGRAPHY N/A 12/18/2019    Procedure: ANGIOGRAM, CORONARY ARTERY;  Surgeon:  Que Fernando III, MD;  Location: [facility] CATH LAB;  Service: Cardiology;  Laterality: N/A;    CORONARY ANGIOGRAPHY INCLUDING BYPASS GRAFTS WITH CATHETERIZATION OF LEFT HEART N/A 7/28/2020    Procedure: ANGIOGRAM, CORONARY, INCLUDING BYPASS GRAFT, WITH LEFT HEART CATHETERIZATION, 9 am;  Surgeon: Rachel Easley MD;  Location: [facility] CATH LAB;  Service: Cardiology;  Laterality: N/A;    CORONARY ARTERY BYPASS GRAFT (CABG) N/A 1/14/2020    Procedure: CORONARY ARTERY BYPASS GRAFT (CABG) x 1     Off Pump;  Surgeon: Huang Altamirano MD;  Location: [facility] OR [address] [place] [address];  Service: Cardiovascular;  Laterality: N/A;    CREATION OF FEMORAL-TIBIAL ARTERY BYPASS Left 4/29/2021    Procedure: CREATION, BYPASS, ARTERIAL, FEMORAL TO ANTERIOR TIBIAL;  Surgeon: Teddy Huber MD;  Location: [facility] OR [address] [place] [address];  Service: Vascular;  Laterality: Left;    CREATION OF FEMOROPOPLITEAL ARTERIAL BYPASS USING GRAFT Left 8/18/2020    Procedure: CREATION, BYPASS, ARTERIAL, FEMORAL TO POPLITEAL, USING GRAFT, LEFT LOWER EXTREMITY;  Surgeon: Teddy Huber MD;  Location: [facility];  Service: Vascular;  Laterality: Left;  REQUEST 7:15 A.M. START----COVID NEGATIVE ON 8/17 1ST CASE STARTKENYA DUEÑAS ON 8/7/2020 @ 942AM-LO  RN PREOP 8/12/2020   T/S-----CLEARED BY CARDS-------PENDING INSURANCE    DEBRIDEMENT OF FOOT Left 9/8/2020    Procedure: DEBRIDEMENT, FOOT;  Surgeon: Rosio Mayes DPM;  Location: [facility] OR;  Service: Podiatry;  Laterality: Left;  request neoxx .   RN Pre Op 9-4-2020, Covid negative on 9/5/20. C A    DEBRIDEMENT OF FOOT  3/4/2021    Procedure: DEBRIDEMENT, FOOT;  Surgeon: Teddy Huber MD;  Location: [facility] OR;  Service: Vascular;;    DEBRIDEMENT OF FOOT Left 3/9/2021    Procedure: DEBRIDEMENT, FOOT, bone biopsy;  Surgeon: Rosio Mayes DPM;  Location: [facility] OR;  Service: Podiatry;  Laterality: Left;  Request neoxx---COVID IN AM  REQUESTING NOON START  RN Phone Pre op.On Blood thinners Plavix and Eliquis.  Covid am of  surgery. C A    DEBRIDEMENT OF FOOT Left 5/4/2021    Procedure: DEBRIDEMENT, FOOT;  Surgeon: Farooq Morley DPM;  Location: [facility] OR [facility] FLR;  Service: Podiatry;  Laterality: Left;    ESOPHAGOGASTRODUODENOSCOPY N/A 11/7/2024    Procedure: EGD (ESOPHAGOGASTRODUODENOSCOPY);  Surgeon: Yony Cancino MD;  Location: [facility];  Service: Endoscopy;  Laterality: N/A;    INSERTION OF TUNNELED CENTRAL VENOUS HEMODIALYSIS CATHETER N/A 1/27/2020    Procedure: Insertion, Catheter, Central Venous, Hemodialysis;  Surgeon: ESTEBAN Gomez III, MD;  Location: [facility] CATH LAB;  Service: Peripheral Vascular;  Laterality: N/A;    INSERTION OF TUNNELED CENTRAL VENOUS HEMODIALYSIS CATHETER  5/10/2023    Procedure: Insertion, Catheter, Central Venous, Hemodialysis;  Surgeon: Romulo Queen MD;  Location: [facility] CATH LAB;  Service: Cardiology;;    IRRIGATION AND DEBRIDEMENT OF LOWER EXTREMITY Left 11/22/2024    Procedure: IRRIGATION AND DEBRIDEMENT, LOWER EXTREMITY;  Surgeon: Yony Lindsey MD;  [address];  Service: Vascular;  Laterality: Left;    MAGNETIC RESONANCE IMAGING Left 11/19/2024    Procedure: MRI (Magnetic Resonance Imagine);  Surgeon: Sophia Hernandez;  Location: [facility] SOPHIA;  Service: Anesthesiology;  Laterality: Left;    PERCUTANEOUS TRANSLUMINAL ANGIOPLASTY N/A 3/4/2021    Procedure: PTA (ANGIOPLASTY, PERCUTANEOUS, TRANSLUMINAL);  Surgeon: Teddy Huber MD;  Location: [facility] OR;  Service: Vascular;  Laterality: N/A;    REMOVAL OF ARTERIOVENOUS GRAFT Left 5/27/2021    Procedure: REMOVAL, GRAFT, LEFT LOWER EXTREMITY, WOUND EXPLORATION;  Surgeon: Teddy Huber MD;  [address];  Service: Vascular;  Laterality: Left;    REMOVAL OF NAIL OF DIGIT Left 3/9/2021    Procedure: REMOVAL, NAIL, DIGIT;  Surgeon: Rosio Mayes DPM;  Location: [facility] OR;  Service: Podiatry;  Laterality: Left;    RIGHT HEART CATHETERIZATION Right 5/10/2023    Procedure: INSERTION, CATHETER,  RIGHT HEART;  Surgeon: Romulo Queen MD;  Location: [facility] CATH LAB;  Service: Cardiology;  Laterality: Right;    STENT, ILIAC ARTERY Left 11/22/2024    Procedure: STENT, ILIAC ARTERY;  Surgeon: Yony Lindsey MD;  Location: [facility] OR 2ND FLR;  Service: Vascular;  Laterality: Left;    THROMBECTOMY Left 3/4/2021    Procedure: THROMBECTOMY, LEFT LOWER EXTREMITY BYPASS GRAFT, ANGIOGRAM, POSSIBLE INTERVENTION, POSSIBLE LEFT LOWER EXTREMITY BYPASS;  Surgeon: Teddy Huber MD;  Location: [facility] OR;  Service: Vascular;  Laterality: Left;    THROMBECTOMY Left 4/29/2021    Procedure: GRAFT THROMBECTOMY, LEFT LOWER EXTREMITY;  Surgeon: Teddy Huber MD;  [facility];  Service: Vascular;  Laterality: Left;  14.5 min  1179.85 mGy  341.01 Gycm2  240 ml dye    THROMBECTOMY  10/22/2021    Procedure: THROMBECTOMY;  Surgeon: Saad Arenas MD;  Location: [facility] CATH LAB/EP;  Service: Cardiology;;       Review of patient's allergies indicates:   Allergen Reactions    Ciprofloxacin Itching    Pcn [penicillins]      Rash; tolerated ceftriaxone on 1/13/20  Tolerating Augmentin November 2024     Current Facility-Administered Medications   Medication Frequency    acetaminophen tablet 650 mg Q4H PRN    apixaban tablet 5 mg BID    atorvastatin tablet 80 mg Daily    calcitRIOL capsule 0.5 mcg Daily    carvediloL tablet 3.125 mg BID WM    ceFEPIme injection 1 g Q24H    [START ON 2/12/2025] cinacalcet tablet 30 mg Every Mon, Wed, Fri    clopidogreL tablet 75 mg Daily    dextrose 50% injection 12.5 g PRN    dextrose 50% injection 25 g PRN    famotidine tablet 20 mg Daily    furosemide tablet 40 mg BID    glucagon (human recombinant) injection 1 mg PRN    glucose chewable tablet 16 g PRN    glucose chewable tablet 24 g PRN    hydrALAZINE tablet 10 mg Q12H    insulin aspart U-100 pen 0-10 Units QID (AC + HS) PRN    insulin glargine U-100 (Lantus) pen 6 Units QHS    isosorbide dinitrate tablet 5 mg BID     "multivitamin tablet Daily    naloxone 0.4 mg/mL injection 0.02 mg PRN    ondansetron injection 4 mg Q8H PRN    oxyCODONE immediate release tablet 5 mg Q6H PRN    oxyCODONE immediate release tablet Tab 10 mg Q6H PRN    pregabalin capsule 75 mg Daily    prochlorperazine injection Soln 5 mg Q6H PRN    sacubitriL-valsartan 24-26 mg per tablet 1 tablet BID    sertraline tablet 100 mg QHS    sevelamer carbonate 2.4 gram powder 2.4 g TID WM    sodium bicarbonate tablet 650 mg TID    sodium chloride 0.9% flush 10 mL Q12H PRN    traZODone tablet 50 mg Nightly PRN    vancomycin - pharmacy to dose pharmacy to manage frequency     Current Outpatient Medications   Medication    carvediloL (COREG) 6.25 MG tablet    acetaminophen (TYLENOL) 325 MG tablet    allopurinoL (ZYLOPRIM) 100 MG tablet    apixaban (ELIQUIS) 5 mg Tab    atorvastatin (LIPITOR) 80 MG tablet    blood sugar diagnostic Strp    blood-glucose meter Misc    blood-glucose sensor (DEXCOM G7 SENSOR) Radha    calcitRIOL (ROCALTROL) 0.5 MCG Cap    carvediloL (COREG) 3.125 MG tablet    cinacalcet (SENSIPAR) 30 MG Tab    clopidogreL (PLAVIX) 75 mg tablet    famotidine (PEPCID) 20 MG tablet    furosemide (LASIX) 40 MG tablet    hydrALAZINE (APRESOLINE) 10 MG tablet    insulin aspart U-100 (NOVOLOG) 100 unit/mL (3 mL) InPn pen    insulin glargine U-100, Lantus, 100 unit/mL (3 mL) SubQ InPn pen    isosorbide dinitrate (ISORDIL) 10 MG tablet    lancets 33 gauge Misc    multivit with min-folic acid (ONE DAILY WOMENS 50 PLUS) 0.4 mg Tab    mupirocin (BACTROBAN) 2 % ointment    pen needle, diabetic 33 gauge x 5/32" Ndle    pregabalin (LYRICA) 75 MG capsule    RENVELA 2.4 gram PwPk    sacubitriL-valsartan (ENTRESTO) 24-26 mg per tablet    semaglutide (OZEMPIC) 0.25 mg or 0.5 mg (2 mg/3 mL) pen injector    sertraline (ZOLOFT) 100 MG tablet    sodium bicarbonate 650 MG tablet    traZODone (DESYREL) 50 MG tablet    zinc oxide 10 % Oint     Family History       Problem Relation (Age of " Onset)    Diabetes Mother, Father, Paternal Grandmother    Heart disease Maternal Grandmother    No Known Problems Maternal Grandfather, Paternal Grandfather          Tobacco Use    Smoking status: Former     Passive exposure: Never    Smokeless tobacco: Never   Substance and Sexual Activity    Alcohol use: No    Drug use: Yes     Types: Marijuana     Comment: occassional    Sexual activity: Yes     Partners: Male     Review of Systems   Constitutional:  Positive for activity change.   Eyes:  Negative for visual disturbance.   Respiratory:  Positive for shortness of breath (on RA).    Cardiovascular:  Positive for leg swelling.   Gastrointestinal:  Negative for abdominal distention.   Genitourinary: Negative.    Musculoskeletal:  Positive for myalgias.   Skin:  Positive for wound.   Neurological:  Negative for weakness.   Psychiatric/Behavioral:  Negative for agitation, behavioral problems and confusion.      Objective:     Vital Signs (Most Recent):  Temp: 96.7 °F (35.9 °C) (02/11/25 0515)  Pulse: 98 (02/11/25 0515)  Resp: 16 (02/11/25 0955)  BP: 126/62 (02/11/25 0515)  SpO2: 100 % (02/11/25 0515) Vital Signs (24h Range):  Temp:  [96.7 °F (35.9 °C)-98.2 °F (36.8 °C)] 96.7 °F (35.9 °C)  Pulse:  [75-98] 98  Resp:  [16-18] 16  SpO2:  [95 %-100 %] 100 %  BP: (126-140)/(60-62) 126/62     Weight: 47.6 kg (104 lb 15 oz) (02/10/25 2042)  Body mass index is 32.02 kg/m².  Body surface area is 1.27 meters squared.    I/O last 3 completed shifts:  In: 250 [IV Piggyback:250]  Out: -      Physical Exam  Vitals and nursing note reviewed.   Constitutional:       Appearance: She is obese. She is not ill-appearing.   HENT:      Head: Normocephalic.   Cardiovascular:      Rate and Rhythm: Normal rate.   Pulmonary:      Effort: Pulmonary effort is normal.   Abdominal:      General: There is no distension.      Tenderness: There is no abdominal tenderness.   Musculoskeletal:         General: Tenderness present.      Left lower leg:  Edema present.      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      Left Lower Extremity: Left leg is amputated above knee.   Skin:     General: Skin is warm.   Neurological:      General: No focal deficit present.      Mental Status: She is alert and oriented to person, place, and time. Mental status is at baseline.   Psychiatric:         Mood and Affect: Mood normal.         Behavior: Behavior normal.          Significant Labs:  CBC:   Recent Labs   Lab 02/11/25  0404   WBC 4.65   RBC 3.55*   HGB 10.9*   HCT 34.8*      MCV 98   MCH 30.7   MCHC 31.3*     CMP:   Recent Labs   Lab 02/11/25  0404   *   CALCIUM 8.8   ALBUMIN 3.1*   PROT 8.8*      K 3.9   CO2 24      BUN 35*   CREATININE 3.1*   ALKPHOS 350*   ALT 28   AST 61*   BILITOT 0.5     All labs within the past 24 hours have been reviewed.

## 2025-02-11 NOTE — ASSESSMENT & PLAN NOTE
"59F PMH ESRD on HD MWF, DM2, PAD s/p BLE AKA with poor wound healing at LLE AKA stump, L common iliac stent, CAD s/p CABG, HFrEF 35-40%, HLD, HTN, Afib, CHF, obesity, pAF, depression, CAROLIN. Presents with LLE pain and swelling. Wound at LLE AKA stump recently treated for cellulitis via abx (11/2024), sent home with outpatient wound care follow up and a wound vac which was removed yesterday. Wound appears to have improved with wound vac, but pt notes edema and drainage noted yesterday. Pt also reports sob and orthopnea since yesterday. Pt denies any current f/c/n/v/cp. Xrays L femur and AP chest unremarkable for any change, venous US LLE shows nonocclusive superficial venous thrombosis involving the L greater saphenous vein and soft tissue edema. Vascular surgery consulted for "nonhealing wound to LLE. Recent common iliac stent placement".     - on physical exam no arterial occlusion to LLE, noted improvement to LLE wound from previous  - no surgical intervention is indicated at this time from vascular surgery perspective  - LLE wound re dressed with ABD pad secured with tape  - vascular surgery will sign off, please re consult with any further questions or concerns, thank you  "

## 2025-02-11 NOTE — ASSESSMENT & PLAN NOTE
Patient has Combined Systolic and Diastolic heart failure that is Chronic. On presentation their CHF was decompensated. Evidence of decompensated CHF on presentation includes: edema and orthopnea. The etiology of their decompensation is likely increased fluid intake. Most recent BNP and echo results are listed below.    Latest ECHO  Results for orders placed during the hospital encounter of 11/15/24    Echo    Interpretation Summary    Limited study to assess LV function and wall motion abnormalities.    Limited echocardiography to assess EF and wall motion    Left Ventricle: The left ventricle is mildly dilated measuring 5.4 cm. There is mild eccentric hypertrophy. There is hypokinesis of the anterior and inferior septum. There is moderately reduced systolic function. Ejection fraction is approximately 35-40%.    Right Ventricle: Severe right ventricular enlargement. Systolic function is severely reduced.    Left Atrium: Left atrium is severely dilated. Atrial septum is bulging to the left.    Right Atrium: Right atrium is mildly dilated.    Tricuspid Valve: There is moderate to severe regurgitation.    Pulmonary Artery: The estimated pulmonary artery systolic pressure is 22 mmHg. This may be underestimated due to increase right atrial pressure.    IVC/SVC: Elevated venous pressure at 15 mmHg. The IVC measures 3.5 cm in diameter.    Current Heart Failure Medications  carvediloL tablet 3.125 mg, 2 times daily with meals, Oral  furosemide tablet 40 mg, 2 times daily, Oral  hydrALAZINE tablet 10 mg, Every 12 hours, Oral  isosorbide dinitrate tablet 5 mg, 2 times daily, Oral  sacubitriL-valsartan 24-26 mg per tablet 1 tablet, 2 times daily, Oral    Plan  - Monitor strict I&Os and daily weights.    - Place on telemetry  - Low sodium diet  - Place on fluid restriction of 1.5 L.   - Cardiology has not been consulted  - The patient's volume status is stable but not at their baseline as indicated by edema and orthopnea.  Satting well on RA  - BNP with AM labs, CXR Pending

## 2025-02-11 NOTE — PLAN OF CARE
Problem: Skin Injury Risk Increased  Goal: Skin Health and Integrity  Outcome: Progressing     Problem: Adult Inpatient Plan of Care  Goal: Plan of Care Review  Outcome: Progressing  Goal: Patient-Specific Goal (Individualized)  Outcome: Progressing  Goal: Absence of Hospital-Acquired Illness or Injury  Outcome: Progressing

## 2025-02-11 NOTE — ED NOTES
Suyapa Connelly, a 59 y.o. female presents to the ED w/ complaint of lle pain.    Pt reports lle vascular surgery 5 weeks ago. Pt now complaining of sudden onset severe lle pain.     Triage note:  Chief Complaint   Patient presents with    Leg Pain     Arrives via EMS for LLE pain. Pt has bilateral BKA. Had sx 5wks ago to restore blood flow to the extremity. Wound healing issues.     Review of patient's allergies indicates:   Allergen Reactions    Ciprofloxacin Itching    Pcn [penicillins]      Rash; tolerated ceftriaxone on 1/13/20  Tolerating Augmentin November 2024     Past Medical History:   Diagnosis Date    Anxiety     Chronic pain syndrome     CKD (chronic kidney disease), stage III     CVD (cardiovascular disease)     Depression     Diabetes mellitus, type 2     GERD (gastroesophageal reflux disease)     Hyperemesis 03/23/2021    Hypokalemia 03/23/2021    Infection of below knee amputation stump 03/12/2022    Osteomyelitis     Osteomyelitis of left foot 04/30/2021    Ulcer of left foot     Vaginal delivery     x1

## 2025-02-11 NOTE — ED PROVIDER NOTES
Encounter Date: 2/10/2025       History     Chief Complaint   Patient presents with    Leg Pain     Arrives via EMS for LLE pain. Pt has bilateral BKA. Had sx 5wks ago to restore blood flow to the extremity. Wound healing issues.     The history is provided by the patient and the spouse.     Patient is a 59-year-old female with a past medical history of diabetes, CKD on dialysis, bilateral AKA who presents due to left lower extremity pain.  Of note, she had a surgery around 6 weeks ago for debridement of a wound on the left lower extremity as well as increasing perfusion to the left lower extremity.  Since that time, she has overall been doing well but started developing severe pain in the left lower extremity yesterday.  The pain has been persistent in his located in/around the wound.  Also of note, she had a wound VAC on her wound up until earlier today when when it was removed per the recommendations of her home health nurse.  The pain began severe to the point that she wanted to come to the emergency department for evaluation.  Other than pain she is not having any other symptoms at this time.    Review of patient's allergies indicates:   Allergen Reactions    Ciprofloxacin Itching    Pcn [penicillins]      Rash; tolerated ceftriaxone on 1/13/20  Tolerating Augmentin November 2024     Past Medical History:   Diagnosis Date    Anxiety     Chronic pain syndrome     CKD (chronic kidney disease), stage III     CVD (cardiovascular disease)     Depression     Diabetes mellitus, type 2     GERD (gastroesophageal reflux disease)     Hyperemesis 03/23/2021    Hypokalemia 03/23/2021    Infection of below knee amputation stump 03/12/2022    Osteomyelitis     Osteomyelitis of left foot 04/30/2021    Ulcer of left foot     Vaginal delivery     x1     Past Surgical History:   Procedure Laterality Date    ABOVE-KNEE AMPUTATION Left 5/18/2021    Procedure: AMPUTATION, ABOVE KNEE;  Surgeon: Teddy Huber MD;  [facility];  Service: Vascular;  Laterality: Left;    ABOVE-KNEE AMPUTATION Right 3/18/2022    Procedure: AMPUTATION, ABOVE KNEE;  Surgeon: DAYNE Florez II, MD;  Location: [address];  Service: Vascular;  Laterality: Right;    Angiogram - Right Extremity Right 7/9/15    ANGIOGRAM, EXTREMITY, UNILATERAL Left 11/22/2024    Procedure: ANGIOGRAM, EXTREMITY, UNILATERAL;  Surgeon: Yony Lindsey MD;  Location: [address];  Service: Vascular;  Laterality: Left;  Left leg angio, L CFA approach  5.4 min  597.31 mGy  73.8959 Gycm2  20 ml dye    angiogram-left leg  10/6/15    ANGIOGRAPHY OF LOWER EXTREMITY Left 4/29/2021    Procedure: ANGIOGRAM, LOWER EXTREMITY;  Surgeon: Teddy Huber MD;  Location: [address];  Service: Vascular;  Laterality: Left;    BELOW KNEE AMPUTATION OF LOWER EXTREMITY Right 12/28/2021    Procedure: AMPUTATION, BELOW KNEE;  Surgeon: Kaitlyn Rojas MD;  Location: [facility];  Service: General;  Laterality: Right;    CATHETERIZATION OF BOTH LEFT AND RIGHT HEART N/A 12/18/2019    Procedure: CATHETERIZATION, HEART, BOTH LEFT AND RIGHT;  Surgeon: Que Fernando III, MD;  [facility];  Service: Cardiology;  Laterality: N/A;    CORONARY ANGIOGRAPHY N/A 12/18/2019    Procedure: ANGIOGRAM, CORONARY ARTERY;  Surgeon: Que Fernando III, MD;  [facility];  Service: Cardiology;  Laterality: N/A;    CORONARY ANGIOGRAPHY INCLUDING BYPASS GRAFTS WITH CATHETERIZATION OF LEFT HEART N/A 7/28/2020    Procedure: ANGIOGRAM, CORONARY, INCLUDING BYPASS GRAFT, WITH LEFT HEART CATHETERIZATION, 9 am;  Surgeon: Rachel Easley MD;  Location: [facility] CATH LAB;  Service: Cardiology;  Laterality: N/A;    CORONARY ARTERY BYPASS GRAFT (CABG) N/A 1/14/2020    Procedure: CORONARY ARTERY BYPASS GRAFT (CABG) x 1     Off Pump;  Surgeon: Huang Altamirano MD;  Location: [address];  Service: Cardiovascular;  Laterality: N/A;    CREATION OF FEMORAL-TIBIAL  ARTERY BYPASS Left 4/29/2021    Procedure: CREATION, BYPASS, ARTERIAL, FEMORAL TO ANTERIOR TIBIAL;  Surgeon: Teddy Huber MD;  [facility];  Service: Vascular;  Laterality: Left;    CREATION OF FEMOROPOPLITEAL ARTERIAL BYPASS USING GRAFT Left 8/18/2020    Procedure: CREATION, BYPASS, ARTERIAL, FEMORAL TO POPLITEAL, USING GRAFT, LEFT LOWER EXTREMITY;  Surgeon: Teddy Huber MD;  Location: [facility] OR;  Service: Vascular;  Laterality: Left;  REQUEST 7:15 A.M. START----COVID NEGATIVE ON 8/17  1ST CASE STARTE PER LEANA ON 8/7/2020 @ 942AM-LO  RN PREOP 8/12/2020   T/S-----CLEARED BY CARDS-------PENDING INSURANCE    DEBRIDEMENT OF FOOT Left 9/8/2020    Procedure: DEBRIDEMENT, FOOT;  Surgeon: Rosio Mayes DPM;  Location: [facility] OR;  Service: Podiatry;  Laterality: Left;  request neoxx .   RN Pre Op 9-4-2020, Covid negative on 9/5/20. C A    DEBRIDEMENT OF FOOT  3/4/2021    Procedure: DEBRIDEMENT, FOOT;  Surgeon: Teddy Huber MD;  Location: [facility] OR;  Service: Vascular;;    DEBRIDEMENT OF FOOT Left 3/9/2021    Procedure: DEBRIDEMENT, FOOT, bone biopsy;  Surgeon: Rosio Mayes DPM;  Location: [facility] OR;  Service: Podiatry;  Laterality: Left;  Request neoxx---COVID IN AM  REQUESTING NOON START  RN Phone Pre op.On Blood thinners Plavix and Eliquis.  Covid am of surgery. C A    DEBRIDEMENT OF FOOT Left 5/4/2021    Procedure: DEBRIDEMENT, FOOT;  Surgeon: Farooq Morley DPM;  Location: [facility] OR 2ND FLR;  Service: Podiatry;  Laterality: Left;    ESOPHAGOGASTRODUODENOSCOPY N/A 11/7/2024    Procedure: EGD (ESOPHAGOGASTRODUODENOSCOPY);  Surgeon: Yony Cancino MD;  Location: [facility];  Service: Endoscopy;  Laterality: N/A;    INSERTION OF TUNNELED CENTRAL VENOUS HEMODIALYSIS CATHETER N/A 1/27/2020    Procedure: Insertion, Catheter, Central Venous, Hemodialysis;  Surgeon: ESTEBAN Gomez III, MD;  Location: [facility] CATH LAB;  Service: Peripheral Vascular;  Laterality: N/A;    INSERTION  OF TUNNELED CENTRAL VENOUS HEMODIALYSIS CATHETER  5/10/2023    Procedure: Insertion, Catheter, Central Venous, Hemodialysis;  Surgeon: Romulo Queen MD;  Location: [facility] CATH LAB;  Service: Cardiology;;    IRRIGATION AND DEBRIDEMENT OF LOWER EXTREMITY Left 11/22/2024    Procedure: IRRIGATION AND DEBRIDEMENT, LOWER EXTREMITY;  Surgeon: Yony Lindsey MD;  Location: [facility] OR 2ND FLR;  Service: Vascular;  Laterality: Left;    MAGNETIC RESONANCE IMAGING Left 11/19/2024    Procedure: MRI (Magnetic Resonance Imagine);  Surgeon: Sophia Hernandez;  Location: [facility];  Service: Anesthesiology;  Laterality: Left;    PERCUTANEOUS TRANSLUMINAL ANGIOPLASTY N/A 3/4/2021    Procedure: PTA (ANGIOPLASTY, PERCUTANEOUS, TRANSLUMINAL);  Surgeon: Teddy Huber MD;  Location: [facility];  Service: Vascular;  Laterality: N/A;    REMOVAL OF ARTERIOVENOUS GRAFT Left 5/27/2021    Procedure: REMOVAL, GRAFT, LEFT LOWER EXTREMITY, WOUND EXPLORATION;  Surgeon: Teddy Huber MD;  Location: [facility] 2ND FLR;  Service: Vascular;  Laterality: Left;    REMOVAL OF NAIL OF DIGIT Left 3/9/2021    Procedure: REMOVAL, NAIL, DIGIT;  Surgeon: Rosio Mayes DPM;  Location: [facility] OR;  Service: Podiatry;  Laterality: Left;    RIGHT HEART CATHETERIZATION Right 5/10/2023    Procedure: INSERTION, CATHETER, RIGHT HEART;  Surgeon: Romulo Queen MD;  Location: [facility] CATH LAB;  Service: Cardiology;  Laterality: Right;    STENT, ILIAC ARTERY Left 11/22/2024    Procedure: STENT, ILIAC ARTERY;  Surgeon: Yony Lindsey MD;  Location: [facility] OR 2ND FLR;  Service: Vascular;  Laterality: Left;    THROMBECTOMY Left 3/4/2021    Procedure: THROMBECTOMY, LEFT LOWER EXTREMITY BYPASS GRAFT, ANGIOGRAM, POSSIBLE INTERVENTION, POSSIBLE LEFT LOWER EXTREMITY BYPASS;  Surgeon: Teddy Huber MD;  Location: [facility] OR;  Service: Vascular;  Laterality: Left;    THROMBECTOMY Left 4/29/2021    Procedure: GRAFT THROMBECTOMY, LEFT LOWER EXTREMITY;   Surgeon: Teddy Huber MD;  Location: [facility] OR [address] [place] [address];  Service: Vascular;  Laterality: Left;  14.5 min  1179.85 mGy  341.01 Gycm2  240 ml dye    THROMBECTOMY  10/22/2021    Procedure: THROMBECTOMY;  Surgeon: Saad Arenas MD;  Location: [facility] CATH LAB/EP;  Service: Cardiology;;     Family History   Problem Relation Name Age of Onset    Diabetes Mother      Diabetes Father      Heart disease Maternal Grandmother      No Known Problems Maternal Grandfather      Diabetes Paternal Grandmother      No Known Problems Paternal Grandfather      Anesthesia problems Neg Hx       Social History     Tobacco Use    Smoking status: Former     Passive exposure: Never    Smokeless tobacco: Never   Substance Use Topics    Alcohol use: No    Drug use: Yes     Types: Marijuana     Comment: occassional     Physical Exam     Initial Vitals [02/10/25 2042]   BP Pulse Resp Temp SpO2   (!) 140/62 75 18 98.1 °F (36.7 °C) 95 %      MAP       --         Physical Exam    Nursing note and vitals reviewed.  Constitutional: She appears well-developed. No distress.   HENT:   Head: Normocephalic and atraumatic. Mouth/Throat: Oropharynx is clear and moist and mucous membranes are normal.   Eyes: EOM are normal. Pupils are equal, round, and reactive to light.   Neck:   Normal range of motion.  Cardiovascular:  Normal rate and regular rhythm.           Pulmonary/Chest: No respiratory distress.   Abdominal: She exhibits no distension.   Musculoskeletal:      Cervical back: Normal range of motion.      Comments: Wound noted in the most distal portion of the left lower extremity around the stump.  The wound itself has a mild amount of surrounding erythema but inside of the wound there is granulation tissue noted and it appears healthy.  The skin surrounding the wound on the more anterior side is warm to touch in his painful to palpation, worrisome for cellulitis     Neurological: She is alert and oriented to person, place, and time.   Skin: Skin  is warm.   Psychiatric:   Somewhat tearful and sad but otherwise normal behavior/thought content         ED Course   Procedures  Labs Reviewed   CBC W/ AUTO DIFFERENTIAL - Abnormal       Result Value    WBC 5.29      RBC 3.43 (*)     Hemoglobin 10.5 (*)     Hematocrit 32.7 (*)     MCV 95      MCH 30.6      MCHC 32.1      RDW 17.9 (*)     Platelets 252      MPV 10.5      Immature Granulocytes 0.4      Gran # (ANC) 3.9      Immature Grans (Abs) 0.02      Lymph # 0.5 (*)     Mono # 0.7      Eos # 0.1      Baso # 0.03      nRBC 0      Gran % 74.0 (*)     Lymph % 9.1 (*)     Mono % 14.0      Eosinophil % 1.9      Basophil % 0.6      Differential Method Automated     COMPREHENSIVE METABOLIC PANEL - Abnormal    Sodium 138      Potassium 3.9      Chloride 101      CO2 23      Glucose 229 (*)     BUN 31 (*)     Creatinine 2.8 (*)     Calcium 8.4 (*)     Total Protein 8.5 (*)     Albumin 3.0 (*)     Total Bilirubin 0.5      Alkaline Phosphatase 368 (*)     AST 57 (*)     ALT 25      eGFR 19.0 (*)     Anion Gap 14     C-REACTIVE PROTEIN - Abnormal    CRP 21.0 (*)     Narrative:     Add on MG per Dr. Sanchez @ 22:11 pm to order # [id number]  Add on CRP per Dr. Sanchez @ 22:57 pm to order # [id number]   MAGNESIUM   C-REACTIVE PROTEIN   SEDIMENTATION RATE   MAGNESIUM    Magnesium 1.9      Narrative:     Add on MG per Dr. Sanchez @ 22:11 pm to order # [id number]  Add on CRP per Dr. Sanchez @ 22:57 pm to order # [id number]   HIV 1 / 2 ANTIBODY   URINALYSIS, REFLEX TO URINE CULTURE          Imaging Results    None          Medications   vancomycin (VANCOCIN) 1,000 mg in 0.9% NaCl 250 mL IVPB (admixture device) (has no administration in time range)   sodium chloride 0.9% flush 10 mL (has no administration in time range)   naloxone 0.4 mg/mL injection 0.02 mg (has no administration in time range)   glucose chewable tablet 16 g (has no administration in time range)   glucose chewable tablet 24 g (has no administration in time  range)   dextrose 50% injection 12.5 g (has no administration in time range)   dextrose 50% injection 25 g (has no administration in time range)   glucagon (human recombinant) injection 1 mg (has no administration in time range)   insulin aspart U-100 pen 0-10 Units (has no administration in time range)   acetaminophen tablet 650 mg (has no administration in time range)   ondansetron injection 4 mg (has no administration in time range)   prochlorperazine injection Soln 5 mg (has no administration in time range)   melatonin tablet 6 mg (has no administration in time range)   oxyCODONE immediate release tablet 5 mg (5 mg Oral Given 2/10/25 2142)     Medical Decision Making  Patient is a 59-year-old female who presents due to worsening lower extremity pain on the left.  Differential diagnosis includes but is not limited to cellulitis, osteomyelitis, musculoskeletal contusion, blood clot.  Given the skin changes as well as the pain that she is having, high clinical concern for cellulitis and we will give her a dose of vancomycin.  We will also obtain a CBC, CMP and inflammatory markers.  We will give a dose of oxycodone for symptom control.    Given her significant past medical history as well as the finding of cellulitis and her significant pain, we will admit to Hospital Medicine for IV antibiotics/pain control.            Amount and/or Complexity of Data Reviewed  Labs: ordered.  Radiology: ordered.    Risk  Prescription drug management.                                      Clinical Impression:  Final diagnoses:  [M79.606] Leg pain  [R60.9] Swelling  [L03.90] Cellulitis          ED Disposition Condition    Observation                 Jacinto Sanchez MD  Resident  02/10/25 0811

## 2025-02-11 NOTE — SUBJECTIVE & OBJECTIVE
Past Medical History:   Diagnosis Date    Anxiety     Chronic pain syndrome     CKD (chronic kidney disease), stage III     CVD (cardiovascular disease)     Depression     Diabetes mellitus, type 2     GERD (gastroesophageal reflux disease)     Hyperemesis 03/23/2021    Hypokalemia 03/23/2021    Infection of below knee amputation stump 03/12/2022    Osteomyelitis     Osteomyelitis of left foot 04/30/2021    Ulcer of left foot     Vaginal delivery     x1       Past Surgical History:   Procedure Laterality Date    ABOVE-KNEE AMPUTATION Left 5/18/2021    Procedure: AMPUTATION, ABOVE KNEE;  Surgeon: Teddy Huber MD;  Location: [address];  Service: Vascular;  Laterality: Left;    ABOVE-KNEE AMPUTATION Right 3/18/2022    Procedure: AMPUTATION, ABOVE KNEE;  Surgeon: DAYNE Florez II, MD;  Location: [address];  Service: Vascular;  Laterality: Right;    Angiogram - Right Extremity Right 7/9/15    ANGIOGRAM, EXTREMITY, UNILATERAL Left 11/22/2024    Procedure: ANGIOGRAM, EXTREMITY, UNILATERAL;  Surgeon: Yony Lindsey MD;  Location: [address];  Service: Vascular;  Laterality: Left;  Left leg angio, L CFA approach  5.4 min  597.31 mGy  73.8959 Gycm2  20 ml dye    angiogram-left leg  10/6/15    ANGIOGRAPHY OF LOWER EXTREMITY Left 4/29/2021    Procedure: ANGIOGRAM, LOWER EXTREMITY;  Surgeon: Teddy Huber MD;  Location: [address];  Service: Vascular;  Laterality: Left;    BELOW KNEE AMPUTATION OF LOWER EXTREMITY Right 12/28/2021    Procedure: AMPUTATION, BELOW KNEE;  Surgeon: Kaitlyn Rojas MD;  Location: [facility] OR;  Service: General;  Laterality: Right;    CATHETERIZATION OF BOTH LEFT AND RIGHT HEART N/A 12/18/2019    Procedure: CATHETERIZATION, HEART, BOTH LEFT AND RIGHT;  Surgeon: Que Fernando III, MD;  Location: [facility] CATH LAB;  Service: Cardiology;  Laterality: N/A;    CORONARY ANGIOGRAPHY N/A 12/18/2019    Procedure: ANGIOGRAM, CORONARY ARTERY;  Surgeon:  Que Fernando III, MD;  Location: [facility] CATH LAB;  Service: Cardiology;  Laterality: N/A;    CORONARY ANGIOGRAPHY INCLUDING BYPASS GRAFTS WITH CATHETERIZATION OF LEFT HEART N/A 7/28/2020    Procedure: ANGIOGRAM, CORONARY, INCLUDING BYPASS GRAFT, WITH LEFT HEART CATHETERIZATION, 9 am;  Surgeon: Rachel Easley MD;  Location: [facility] CATH LAB;  Service: Cardiology;  Laterality: N/A;    CORONARY ARTERY BYPASS GRAFT (CABG) N/A 1/14/2020    Procedure: CORONARY ARTERY BYPASS GRAFT (CABG) x 1     Off Pump;  Surgeon: Huang Altamirano MD;  Location: [facility] OR [address] [place] [address];  Service: Cardiovascular;  Laterality: N/A;    CREATION OF FEMORAL-TIBIAL ARTERY BYPASS Left 4/29/2021    Procedure: CREATION, BYPASS, ARTERIAL, FEMORAL TO ANTERIOR TIBIAL;  Surgeon: Teddy Huber MD;  Location: [facility] OR [address] [place] [address];  Service: Vascular;  Laterality: Left;    CREATION OF FEMOROPOPLITEAL ARTERIAL BYPASS USING GRAFT Left 8/18/2020    Procedure: CREATION, BYPASS, ARTERIAL, FEMORAL TO POPLITEAL, USING GRAFT, LEFT LOWER EXTREMITY;  Surgeon: Teddy Huber MD;  Location: [facility];  Service: Vascular;  Laterality: Left;  REQUEST 7:15 A.M. START----COVID NEGATIVE ON 8/17 1ST CASE STARTKENYA DUEÑAS ON 8/7/2020 @ 942AM-LO  RN PREOP 8/12/2020   T/S-----CLEARED BY CARDS-------PENDING INSURANCE    DEBRIDEMENT OF FOOT Left 9/8/2020    Procedure: DEBRIDEMENT, FOOT;  Surgeon: Rosio Mayes DPM;  Location: [facility] OR;  Service: Podiatry;  Laterality: Left;  request neoxx .   RN Pre Op 9-4-2020, Covid negative on 9/5/20. C A    DEBRIDEMENT OF FOOT  3/4/2021    Procedure: DEBRIDEMENT, FOOT;  Surgeon: Teddy Huber MD;  Location: [facility] OR;  Service: Vascular;;    DEBRIDEMENT OF FOOT Left 3/9/2021    Procedure: DEBRIDEMENT, FOOT, bone biopsy;  Surgeon: Rosio Mayes DPM;  Location: [facility] OR;  Service: Podiatry;  Laterality: Left;  Request neoxx---COVID IN AM  REQUESTING NOON START  RN Phone Pre op.On Blood thinners Plavix and Eliquis.  Covid am of  surgery. C A    DEBRIDEMENT OF FOOT Left 5/4/2021    Procedure: DEBRIDEMENT, FOOT;  Surgeon: Farooq Morley DPM;  Location[facility] OR [facility];  Service: Podiatry;  Laterality: Left;    ESOPHAGOGASTRODUODENOSCOPY N/A 11/7/2024    Procedure: EGD (ESOPHAGOGASTRODUODENOSCOPY);  Surgeon: Yony Cancino MD;  Location: [facility];  Service: Endoscopy;  Laterality: N/A;    INSERTION OF TUNNELED CENTRAL VENOUS HEMODIALYSIS CATHETER N/A 1/27/2020    Procedure: Insertion, Catheter, Central Venous, Hemodialysis;  Surgeon: ESTEBAN Gomez III, MD;  Location: [facility] CATH LAB;  Service: Peripheral Vascular;  Laterality: N/A;    INSERTION OF TUNNELED CENTRAL VENOUS HEMODIALYSIS CATHETER  5/10/2023    Procedure: Insertion, Catheter, Central Venous, Hemodialysis;  Surgeon: Romulo Queen MD;  Location: [facility] CATH LAB;  Service: Cardiology;;    IRRIGATION AND DEBRIDEMENT OF LOWER EXTREMITY Left 11/22/2024    Procedure: IRRIGATION AND DEBRIDEMENT, LOWER EXTREMITY;  Surgeon: Yony Lindsey MD;  [address];  Service: Vascular;  Laterality: Left;    MAGNETIC RESONANCE IMAGING Left 11/19/2024    Procedure: MRI (Magnetic Resonance Imagine);  Surgeon: Sophia Hernandez;  Location: [facility];  Service: Anesthesiology;  Laterality: Left;    PERCUTANEOUS TRANSLUMINAL ANGIOPLASTY N/A 3/4/2021    Procedure: PTA (ANGIOPLASTY, PERCUTANEOUS, TRANSLUMINAL);  Surgeon: Teddy Huber MD;  Location: [facility] OR;  Service: Vascular;  Laterality: N/A;    REMOVAL OF ARTERIOVENOUS GRAFT Left 5/27/2021    Procedure: REMOVAL, GRAFT, LEFT LOWER EXTREMITY, WOUND EXPLORATION;  Surgeon: Teddy Huber MD;  [address];  Service: Vascular;  Laterality: Left;    REMOVAL OF NAIL OF DIGIT Left 3/9/2021    Procedure: REMOVAL, NAIL, DIGIT;  Surgeon: Rosio Mayes DPM;  Location: [facility] OR;  Service: Podiatry;  Laterality: Left;    RIGHT HEART CATHETERIZATION Right 5/10/2023    Procedure: INSERTION, CATHETER,  RIGHT HEART;  Surgeon: Roumlo Queen MD;  Location: [facility] CATH LAB;  Service: Cardiology;  Laterality: Right;    STENT, ILIAC ARTERY Left 11/22/2024    Procedure: STENT, ILIAC ARTERY;  Surgeon: Yony Lindsey MD;  Location: [facility] OR [facility];  Service: Vascular;  Laterality: Left;    THROMBECTOMY Left 3/4/2021    Procedure: THROMBECTOMY, LEFT LOWER EXTREMITY BYPASS GRAFT, ANGIOGRAM, POSSIBLE INTERVENTION, POSSIBLE LEFT LOWER EXTREMITY BYPASS;  Surgeon: Teddy Huber MD;  Location: [facility] OR;  Service: Vascular;  Laterality: Left;    THROMBECTOMY Left 4/29/2021    Procedure: GRAFT THROMBECTOMY, LEFT LOWER EXTREMITY;  Surgeon: Teddy Huber MD;  [facility];  Service: Vascular;  Laterality: Left;  14.5 min  1179.85 mGy  341.01 Gycm2  240 ml dye    THROMBECTOMY  10/22/2021    Procedure: THROMBECTOMY;  Surgeon: Saad Arenas MD;  Location: [facility] CATH LAB/EP;  Service: Cardiology;;       Review of patient's allergies indicates:   Allergen Reactions    Ciprofloxacin Itching    Pcn [penicillins]      Rash; tolerated ceftriaxone on 1/13/20  Tolerating Augmentin November 2024       No current facility-administered medications on file prior to encounter.     Current Outpatient Medications on File Prior to Encounter   Medication Sig    acetaminophen (TYLENOL) 325 MG tablet Take 2 tablets (650 mg total) by mouth every 8 (eight) hours as needed for Pain.    allopurinoL (ZYLOPRIM) 100 MG tablet TAKE 1/2 TABLET(50 MG) BY MOUTH EVERY OTHER DAY    apixaban (ELIQUIS) 5 mg Tab Take 1 tablet (5 mg total) by mouth 2 (two) times daily.    atorvastatin (LIPITOR) 80 MG tablet Take 1 tablet (80 mg total) by mouth once daily.    blood sugar diagnostic Strp 1 each by Misc.(Non-Drug; Combo Route) route 4 (four) times daily before meals and nightly.    blood-glucose meter Misc Before meals and at bedtime    blood-glucose sensor (DEXCOM G7 SENSOR) Radha 1 each by Misc.(Non-Drug; Combo Route) route once  "daily.    calcitRIOL (ROCALTROL) 0.5 MCG Cap Take 1 capsule (0.5 mcg total) by mouth once daily.    carvediloL (COREG) 3.125 MG tablet Take 1 tablet (3.125 mg total) by mouth 2 (two) times daily with meals.    cinacalcet (SENSIPAR) 30 MG Tab Take 1 tablet (30 mg total) by mouth every Mon, Wed, Fri.    clopidogreL (PLAVIX) 75 mg tablet Take 1 tablet (75 mg total) by mouth once daily.    famotidine (PEPCID) 20 MG tablet TAKE 1 TABLET(20 MG) BY MOUTH TWICE DAILY    furosemide (LASIX) 40 MG tablet Take 1 tablet (40 mg total) by mouth 2 (two) times daily.    hydrALAZINE (APRESOLINE) 10 MG tablet Take 1 tablet (10 mg total) by mouth every 12 (twelve) hours.    insulin aspart U-100 (NOVOLOG) 100 unit/mL (3 mL) InPn pen Inject 5 Units into the skin before meals and at bedtime as needed (Hyperglycemia). **MODERATE CORRECTION DOSE**  Blood Glucose  mg/dL                  Pre-meal                2200  151-200                2 units                    1 unit  201-250                4 units                    2 units  251-300                6 units                    3 units  301-350                8 units                    4 units  >350                     10 units                  5 units  Administer subcutaneously if needed at times designated by monitoring  schedule.    insulin glargine U-100, Lantus, 100 unit/mL (3 mL) SubQ InPn pen Inject 5 Units into the skin every evening. (Patient taking differently: Inject 8 Units into the skin every evening.)    isosorbide dinitrate (ISORDIL) 10 MG tablet Take 0.5 tablets (5 mg total) by mouth 2 (two) times daily.    lancets 33 gauge Misc TEST 3 TIMES DAILY    multivit with min-folic acid (ONE DAILY WOMENS 50 PLUS) 0.4 mg Tab Take 1 tablet by mouth once daily.    mupirocin (BACTROBAN) 2 % ointment Apply topically 2 (two) times daily.    pen needle, diabetic 33 gauge x 5/32" Ndle 1 each by Misc.(Non-Drug; Combo Route) route 4 (four) times daily before meals and nightly.    pregabalin " (LYRICA) 75 MG capsule Take 1 capsule (75 mg total) by mouth Daily.    RENVELA 2.4 gram PwPk Take 1 packet by mouth 3 (three) times daily with meals.    sacubitriL-valsartan (ENTRESTO) 24-26 mg per tablet Take 1 tablet by mouth 2 (two) times daily.    semaglutide (OZEMPIC) 0.25 mg or 0.5 mg (2 mg/3 mL) pen injector Inject 0.5 mg into the skin every 7 days.    sertraline (ZOLOFT) 100 MG tablet Take 1 tablet (100 mg total) by mouth once daily. (Patient taking differently: Take 100 mg by mouth every evening.)    sodium bicarbonate 650 MG tablet TAKE 1 TABLET(650 MG) BY MOUTH THREE TIMES DAILY (Patient taking differently: Take 650 mg by mouth 3 (three) times daily.)    traZODone (DESYREL) 50 MG tablet Take 2 tablets (100 mg total) by mouth nightly as needed for Insomnia. (Patient taking differently: Take 50 mg by mouth nightly as needed for Insomnia.)    zinc oxide 10 % Oint Apply 1 Application topically 2 (two) times a day.    [DISCONTINUED] lancing device Misc 1 Device by Misc.(Non-Drug; Combo Route) route 2 (two) times daily with meals.     Family History       Problem Relation (Age of Onset)    Diabetes Mother, Father, Paternal Grandmother    Heart disease Maternal Grandmother    No Known Problems Maternal Grandfather, Paternal Grandfather          Tobacco Use    Smoking status: Former     Passive exposure: Never    Smokeless tobacco: Never   Substance and Sexual Activity    Alcohol use: No    Drug use: Yes     Types: Marijuana     Comment: occassional    Sexual activity: Yes     Partners: Male     Review of Systems   Constitutional:  Negative for chills, fatigue and fever.   HENT:  Negative for congestion and sore throat.    Respiratory:  Negative for cough and shortness of breath.         Positive for orthopnea   Cardiovascular:  Negative for chest pain.   Gastrointestinal:  Negative for abdominal pain, constipation, nausea and vomiting.   Genitourinary:  Negative for difficulty urinating and dysuria.    Musculoskeletal:  Positive for myalgias. Negative for back pain.   Skin:  Positive for wound (LLE wound).   Neurological:  Negative for dizziness and numbness.   Psychiatric/Behavioral:  Negative for confusion and sleep disturbance.    All other systems reviewed and are negative.    Objective:     Vital Signs (Most Recent):  Temp: 98.2 °F (36.8 °C) (02/10/25 2315)  Pulse: 80 (02/10/25 2315)  Resp: 18 (02/10/25 2315)  BP: 134/60 (02/10/25 2315)  SpO2: 95 % (02/10/25 2315) Vital Signs (24h Range):  Temp:  [98.1 °F (36.7 °C)-98.2 °F (36.8 °C)] 98.2 °F (36.8 °C)  Pulse:  [75-80] 80  Resp:  [18] 18  SpO2:  [95 %] 95 %  BP: (134-140)/(60-62) 134/60     Weight: 47.6 kg (104 lb 15 oz)  Body mass index is 32.02 kg/m².     Physical Exam  Vitals and nursing note reviewed.   Constitutional:       General: She is awake. She is not in acute distress.     Appearance: Normal appearance. She is well-developed. She is obese.   HENT:      Head: Normocephalic and atraumatic.      Mouth/Throat:      Pharynx: No oropharyngeal exudate.   Eyes:      Conjunctiva/sclera: Conjunctivae normal.      Pupils: Pupils are equal, round, and reactive to light.   Cardiovascular:      Rate and Rhythm: Normal rate and regular rhythm.      Heart sounds: Normal heart sounds.      Arteriovenous access: Left arteriovenous access is present.     Comments: Left HD cath. CDI  Pulmonary:      Effort: Pulmonary effort is normal. No respiratory distress.      Breath sounds: Normal breath sounds. No wheezing.   Abdominal:      General: Bowel sounds are normal. There is no distension.      Palpations: Abdomen is soft.      Tenderness: There is no abdominal tenderness.   Musculoskeletal:         General: Tenderness (BLE) present. Normal range of motion.      Cervical back: Normal range of motion and neck supple.      Left lower leg: Edema present.      Comments: BLE AKA      Right Lower Extremity: Right leg is amputated above knee.      Left Lower Extremity: Left  leg is amputated above knee.   Lymphadenopathy:      Cervical: No cervical adenopathy.   Skin:     General: Skin is warm and dry.      Capillary Refill: Capillary refill takes less than 2 seconds.      Findings: Wound (wound bed pink with yellow drainage present. No odor.) present. No rash.   Neurological:      Mental Status: She is alert and oriented to person, place, and time. Mental status is at baseline.      Cranial Nerves: No cranial nerve deficit.      Sensory: Sensation is intact. No sensory deficit.      Coordination: Coordination normal.   Psychiatric:         Behavior: Behavior normal. Behavior is cooperative.         Thought Content: Thought content normal.     LLE:         CRANIAL NERVES     CN III, IV, VI   Pupils are equal, round, and reactive to light.       Significant Labs: All pertinent labs within the past 24 hours have been reviewed.  CBC:   Recent Labs   Lab 02/10/25  2139   WBC 5.29   HGB 10.5*   HCT 32.7*        CMP:   Recent Labs   Lab 02/10/25  2139      K 3.9      CO2 23   *   BUN 31*   CREATININE 2.8*   CALCIUM 8.4*   PROT 8.5*   ALBUMIN 3.0*   BILITOT 0.5   ALKPHOS 368*   AST 57*   ALT 25   ANIONGAP 14       Significant Imaging: I have reviewed all pertinent imaging results/findings within the past 24 hours.
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
## 2025-02-11 NOTE — ASSESSMENT & PLAN NOTE
Creatine stable for now. BMP reviewed- noted Estimated Creatinine Clearance: 13.3 mL/min (A) (based on SCr of 2.8 mg/dL (H)). according to latest data. Based on current GFR, CKD stage is stage 4 - GFR 15-29.  Monitor UOP and serial BMP and adjust therapy as needed. Renally dose meds. Avoid nephrotoxic medications and procedures.    - Nephrology consult for HD management, appreciate assistance  - last HD 2/10/2025 prior to admit  - Left subclavian HD Cath   - continue sevelamer 800 mg PO TIDWM   - DM Renal diet with 1.5 L fluid restriction

## 2025-02-11 NOTE — PLAN OF CARE
02/11/25 1032   Post-Acute Status   Post-Acute Authorization Home Health   Home Health Status Referrals Sent   Coverage Payor: [employer] MANAGED MEDICARE - [employer] MED[employer]ARE [employer] - [employer]   Discharge Delays None known at this time   Discharge Plan   Discharge Plan A Home with family;Home Health   Discharge Plan B Home Health;Home with family     EDUARD spoke with Josephine at [facility] [phone]. Pt is currently receiving home health with [facility]. EDUARD sent referrals. EDUARD to follow up.     Discharge Plan A and Plan B have been determined by review of patient's clinical status, future medical and therapeutic needs, and coverage/benefits for post-acute care in coordination with multidisciplinary team members.    JENELLE Connors, CSW.   Case Management  [facility]  Email: [email or web address]

## 2025-02-11 NOTE — HPI
"59F PMH ESRD on HD MWF, DM2, PAD s/p BLE AKA with poor wound healing at LLE AKA stump, L common iliac stent, CAD s/p CABG, HFrEF 35-40%, HLD, HTN, Afib, CHF, obesity, pAF, depression, CAROLIN. Presents with cc of LLE pain described as an intermittent, 10/10, sharp, shooting pain, localized to LLE AKA stump. Pain began yesterday, 2/10/2025. Wound at LLE AKA stump recently treated for cellulitis via abx (11/2024), sent home with outpatient wound care follow up and a wound vac which was removed yesterday. Wound appears to have improved with wound vac, but pt notes edema and drainage noted yesterday. Pt also reports sob and orthopnea since yesterday. Pt denies any current f/c/n/v/cp. In the ED, AF, VSS, CMP consistent with ESRD, glucose 229, CRP 21 and elevated liver enzymes. Started on IV vancomycin. Placed in observation with HM. Xrays L femur and AP chest unremarkable for any change, venous US LLE shows nonocclusive superficial venous thrombosis involving the L greater saphenous vein and soft tissue edema.    Vascular surgery consulted for "nonhealing wound to LLE. Recent common iliac stent placement".   "

## 2025-02-11 NOTE — ASSESSMENT & PLAN NOTE
59 y.o. Black or  Female ESRD-HD M-W-F presents to ED on 2/10/2025 with diagnosis of: Cellulitis [L03.90]   Nephrology consulted for inpatient ESRD-HD management    Outpatient HD Information:  -Dialysis modality: Hemodialysis  -Outpatient HD unit: [facility]  -Nephrologist: ?  -HD TX days: Monday/Wednesday/Friday, duration of treatment: 3.5 hrs  -Last HD TX prior to hospital admission: 1/10/25  -Dialysis access: dialysis catheter   -Residual urine: Minium  -EDW: ?    Assessment:   - Dialysis for metabolic clearance and volume management will be provided tomorrow AM.  - Consented for routine dialysis today.  - Labs reviewed and dialysate to be adjusted to current labs.   - Continue to monitor intake and output  - Please avoid gadolinium, fleets, phos-based laxatives, NSAIDs  - Dialysis thrice weekly unless more urgent indications arise. Will evaluate RRT requirements Daily.    Anemia of ESRD   Recent Labs   Lab 02/10/25  2139 02/11/25  0404   WBC 5.29 4.65   HGB 10.5* 10.9*   HCT 32.7* 34.8*    259     Lab Results   Component Value Date    FESATURATED 35 06/22/2024    FERRITIN 757 (H) 06/22/2024       - Goal in ESRD is Hgb of 10-11. Hgb 10.9.   - EPO can be administered and dosed per his OP unit upon discharge.  - if patient is on iron infusions please D/C when active infection, cautiously use EPO when hx of malignancy, high BP (SBP usually > 170mmhg).    Mineral Bone Disease in ESRD   Lab Results   Component Value Date    .8 (H) 10/28/2024    CALCIUM 8.8 02/11/2025    ALBUMIN 3.1 (L) 02/11/2025    CAION 0.92 (L) 05/17/2023    PHOS 4.1 02/11/2025       - F/U PO4, Mg, Calcium. And albumin levels daily.   - Renal diet with protein intake goal 1.5 g/kg/d with 1 L fluid restriction   - If patient has poor oral intake, recommend nephro nutritional shakes (ex Novasource)  - Continue or restart home phos binder, phos 4.1

## 2025-02-11 NOTE — SUBJECTIVE & OBJECTIVE
(Not in a hospital admission)      Review of patient's allergies indicates:   Allergen Reactions    Ciprofloxacin Itching    Pcn [penicillins]      Rash; tolerated ceftriaxone on 1/13/20  Tolerating Augmentin November 2024       Past Medical History:   Diagnosis Date    Anxiety     Chronic pain syndrome     CKD (chronic kidney disease), stage III     CVD (cardiovascular disease)     Depression     Diabetes mellitus, type 2     GERD (gastroesophageal reflux disease)     Hyperemesis 03/23/2021    Hypokalemia 03/23/2021    Infection of below knee amputation stump 03/12/2022    Osteomyelitis     Osteomyelitis of left foot 04/30/2021    Ulcer of left foot     Vaginal delivery     x1     Past Surgical History:   Procedure Laterality Date    ABOVE-KNEE AMPUTATION Left 5/18/2021    Procedure: AMPUTATION, ABOVE KNEE;  Surgeon: Teddy Huber MD;  Location: [facility] OR [address] [place] [address];  Service: Vascular;  Laterality: Left;    ABOVE-KNEE AMPUTATION Right 3/18/2022    Procedure: AMPUTATION, ABOVE KNEE;  Surgeon: DAYNE Florez II, MD;  Location: [facility] OR [address] [place] [address];  Service: Vascular;  Laterality: Right;    Angiogram - Right Extremity Right 7/9/15    ANGIOGRAM, EXTREMITY, UNILATERAL Left 11/22/2024    Procedure: ANGIOGRAM, EXTREMITY, UNILATERAL;  Surgeon: Yony Lindsey MD;  Location: [facility] OR [address] [place] [address];  Service: Vascular;  Laterality: Left;  Left leg angio, L CFA approach  5.4 min  597.31 mGy  73.8959 Gycm2  20 ml dye    angiogram-left leg  10/6/15    ANGIOGRAPHY OF LOWER EXTREMITY Left 4/29/2021    Procedure: ANGIOGRAM, LOWER EXTREMITY;  Surgeon: Teddy Huber MD;  Location: [facility] OR [address] [place] [address];  Service: Vascular;  Laterality: Left;    BELOW KNEE AMPUTATION OF LOWER EXTREMITY Right 12/28/2021    Procedure: AMPUTATION, BELOW KNEE;  Surgeon: Kaitlyn Rojas MD;  Location: [facility];  Service: General;  Laterality: Right;    CATHETERIZATION OF BOTH LEFT AND RIGHT HEART N/A 12/18/2019    Procedure: CATHETERIZATION,  HEART, BOTH LEFT AND RIGHT;  Surgeon: Que Fernando III, MD;  Location: [facility]placed [facility] CATH LAB;  Service: Cardiology;  Laterality: N/A;    CORONARY ANGIOGRAPHY N/A 12/18/2019    Procedure: ANGIOGRAM, CORONARY ARTERY;  Surgeon: Que Fernando III, MD;  Location: Replaced by [facility] CATH LAB;  Service: Cardiology;  Laterality: N/A;    CORONARY ANGIOGRAPHY INCLUDING BYPASS GRAFTS WITH CATHETERIZATION OF LEFT HEART N/A 7/28/2020    Procedure: ANGIOGRAM, CORONARY, INCLUDING BYPASS GRAFT, WITH LEFT HEART CATHETERIZATION, 9 am;  Surgeon: Rachel Easley MD;  Location: [facility] CATH LAB;  Service: Cardiology;  Laterality: N/A;    CORONARY ARTERY BYPASS GRAFT (CABG) N/A 1/14/2020    Procedure: CORONARY ARTERY BYPASS GRAFT (CABG) x 1     Off Pump;  Surgeon: Huang Altamirano MD;  Location: [facility] OR [address] [place] [address];  Service: Cardiovascular;  Laterality: N/A;    CREATION OF FEMORAL-TIBIAL ARTERY BYPASS Left 4/29/2021    Procedure: CREATION, BYPASS, ARTERIAL, FEMORAL TO ANTERIOR TIBIAL;  Surgeon: Teddy Huber MD;  Location: [facility] OR [address] [place] [address];  Service: Vascular;  Laterality: Left;    CREATION OF FEMOROPOPLITEAL ARTERIAL BYPASS USING GRAFT Left 8/18/2020    Procedure: CREATION, BYPASS, ARTERIAL, FEMORAL TO POPLITEAL, USING GRAFT, LEFT LOWER EXTREMITY;  Surgeon: Teddy Huber MD;  Location: [facility];  Service: Vascular;  Laterality: Left;  REQUEST 7:15 A.M. START----COVID NEGATIVE ON 8/17  1ST CASE JASWANT PER LEANA ON 8/7/2020 @ 942AM-LO  RN PREOP 8/12/2020   T/S-----CLEARED BY CARDS-------PENDING INSURANCE    DEBRIDEMENT OF FOOT Left 9/8/2020    Procedure: DEBRIDEMENT, FOOT;  Surgeon: Rosio Mayes DPM;  Location: [facility] OR;  Service: Podiatry;  Laterality: Left;  request neoxx .   RN Pre Op 9-4-2020, Covid negative on 9/5/20. C A    DEBRIDEMENT OF FOOT  3/4/2021    Procedure: DEBRIDEMENT, FOOT;  Surgeon: Teddy Huber MD;  Location: [facility] OR;  Service: Vascular;;    DEBRIDEMENT OF FOOT Left 3/9/2021    Procedure: DEBRIDEMENT, FOOT,  bone biopsy;  Surgeon: Rosio Mayes DPM;  Location: [facility] OR;  Service: Podiatry;  Laterality: Left;  Request neoxx---COVID IN AM  REQUESTING NOON START  RN Phone Pre op.On Blood thinners Plavix and Eliquis.  Covid am of surgery. C A    DEBRIDEMENT OF FOOT Left 5/4/2021    Procedure: DEBRIDEMENT, FOOT;  Surgeon: Farooq Morley DPM;  [facility];  Service: Podiatry;  Laterality: Left;    ESOPHAGOGASTRODUODENOSCOPY N/A 11/7/2024    Procedure: EGD (ESOPHAGOGASTRODUODENOSCOPY);  Surgeon: Yony Cancino MD;  Location: [facility];  Service: Endoscopy;  Laterality: N/A;    INSERTION OF TUNNELED CENTRAL VENOUS HEMODIALYSIS CATHETER N/A 1/27/2020    Procedure: Insertion, Catheter, Central Venous, Hemodialysis;  Surgeon: ESTEBAN Gomez III, MD;  Location: [facility] CATH LAB;  Service: Peripheral Vascular;  Laterality: N/A;    INSERTION OF TUNNELED CENTRAL VENOUS HEMODIALYSIS CATHETER  5/10/2023    Procedure: Insertion, Catheter, Central Venous, Hemodialysis;  Surgeon: Romulo Queen MD;  Location: [facility] CATH LAB;  Service: Cardiology;;    IRRIGATION AND DEBRIDEMENT OF LOWER EXTREMITY Left 11/22/2024    Procedure: IRRIGATION AND DEBRIDEMENT, LOWER EXTREMITY;  Surgeon: Yony Lindsey MD;  [address]  Service: Vascular;  Laterality: Left;    MAGNETIC RESONANCE IMAGING Left 11/19/2024    Procedure: MRI (Magnetic Resonance Imagine);  Surgeon: Sophia Hernandez;  Location: [facility];  Service: Anesthesiology;  Laterality: Left;    PERCUTANEOUS TRANSLUMINAL ANGIOPLASTY N/A 3/4/2021    Procedure: PTA (ANGIOPLASTY, PERCUTANEOUS, TRANSLUMINAL);  Surgeon: Teddy Huber MD;  Location: [facility];  Service: Vascular;  Laterality: N/A;    REMOVAL OF ARTERIOVENOUS GRAFT Left 5/27/2021    Procedure: REMOVAL, GRAFT, LEFT LOWER EXTREMITY, WOUND EXPLORATION;  Surgeon: Teddy Huber MD;  [address];  Service: Vascular;  Laterality: Left;    REMOVAL OF NAIL OF DIGIT  Left 3/9/2021    Procedure: REMOVAL, NAIL, DIGIT;  Surgeon: Rosio Mayes DPM;  Location: [facility] OR;  Service: Podiatry;  Laterality: Left;    RIGHT HEART CATHETERIZATION Right 5/10/2023    Procedure: INSERTION, CATHETER, RIGHT HEART;  Surgeon: Romulo Queen MD;  Location: [facility] CATH LAB;  Service: Cardiology;  Laterality: Right;    STENT, ILIAC ARTERY Left 11/22/2024    Procedure: STENT, ILIAC ARTERY;  Surgeon: Yony Lindsey MD;  [facility];  Service: Vascular;  Laterality: Left;    THROMBECTOMY Left 3/4/2021    Procedure: THROMBECTOMY, LEFT LOWER EXTREMITY BYPASS GRAFT, ANGIOGRAM, POSSIBLE INTERVENTION, POSSIBLE LEFT LOWER EXTREMITY BYPASS;  Surgeon: Teddy Huber MD;  Location: [facility] OR;  Service: Vascular;  Laterality: Left;    THROMBECTOMY Left 4/29/2021    Procedure: GRAFT THROMBECTOMY, LEFT LOWER EXTREMITY;  Surgeon: Teddy Huber MD;  [address]  Service: Vascular;  Laterality: Left;  14.5 min  1179.85 mGy  341.01 Gycm2  240 ml dye    THROMBECTOMY  10/22/2021    Procedure: THROMBECTOMY;  Surgeon: Saad Arenas MD;  Location: [facility] CATH LAB/EP;  Service: Cardiology;;     Family History       Problem Relation (Age of Onset)    Diabetes Mother, Father, Paternal Grandmother    Heart disease Maternal Grandmother    No Known Problems Maternal Grandfather, Paternal Grandfather          Tobacco Use    Smoking status: Former     Passive exposure: Never    Smokeless tobacco: Never   Substance and Sexual Activity    Alcohol use: No    Drug use: Yes     Types: Marijuana     Comment: occassional    Sexual activity: Yes     Partners: Male     Review of Systems   Constitutional: Negative.    Respiratory:  Positive for shortness of breath.    Cardiovascular:  Positive for leg swelling.   Gastrointestinal: Negative.    Genitourinary: Negative.    Musculoskeletal:  Positive for myalgias.   Skin:  Positive for wound.   Neurological: Negative.   "  Psychiatric/Behavioral: Negative.       Objective:     Vital Signs (Most Recent):  Temp: 96.7 °F (35.9 °C) (02/11/25 0515)  Pulse: 98 (02/11/25 0515)  Resp: 18 (02/11/25 0515)  BP: 126/62 (02/11/25 0515)  SpO2: 100 % (02/11/25 0515) Vital Signs (24h Range):  Temp:  [96.7 °F (35.9 °C)-98.2 °F (36.8 °C)] 96.7 °F (35.9 °C)  Pulse:  [75-98] 98  Resp:  [18] 18  SpO2:  [95 %-100 %] 100 %  BP: (126-140)/(60-62) 126/62     Weight: 47.6 kg (104 lb 15 oz)  Body mass index is 32.02 kg/m².      Physical Exam  Constitutional:       Appearance: She is obese. She is not ill-appearing.   HENT:      Head: Normocephalic.   Cardiovascular:      Rate and Rhythm: Normal rate.      Pulses:           Femoral pulses are 0 on the left side.     Comments: L femoral detected with doppler at bedside, biphasic  Pulmonary:      Effort: Pulmonary effort is normal.   Abdominal:      Tenderness: There is no abdominal tenderness. There is no guarding.   Musculoskeletal:         General: Tenderness present.      Left lower leg: Edema present.   Skin:     General: Skin is warm.   Neurological:      General: No focal deficit present.      Mental Status: She is oriented to person, place, and time. Mental status is at baseline.   Psychiatric:         Mood and Affect: Mood normal.         Behavior: Behavior normal.          Significant Labs:  Cardiac markers: No results for input(s): "CKMB", "CPKMB", "TROPONINT", "TROPONINI", "MYOGLOBIN" in the last 48 hours.  CBC:   Recent Labs   Lab 02/11/25  0404   WBC 4.65   RBC 3.55*   HGB 10.9*   HCT 34.8*      MCV 98   MCH 30.7   MCHC 31.3*     CMP:   Recent Labs   Lab 02/11/25  0404   *   CALCIUM 8.8   ALBUMIN 3.1*   PROT 8.8*      K 3.9   CO2 24      BUN 35*   CREATININE 3.1*   ALKPHOS 350*   ALT 28   AST 61*   BILITOT 0.5     Coagulation: No results for input(s): "LABPROT", "INR", "APTT" in the last 48 hours.  eGFR: No results for input(s): "EGFRIFAFRICA", "EGFRCYSTC", "LABGLOM" in " "the last 48 hours.    Invalid input(s): "EGFRNON-AA"    Significant Diagnostics:  I have reviewed all pertinent imaging results/findings within the past 24 hours.  "

## 2025-02-11 NOTE — NURSING
Patient resting in room at this time, all alarms and safety features active, plan of care discussed with patient and family when available. All meds given and tolerated by patient unless otherwise specified.  All questions and concerned answered. All personal items needed in reach, call light in reach as well unless otherwise specified. Hourly purposeful rounding noted and explained to family and patient. No further concerns at this time will cont with current plan of care.     at bedside    Wounds addressed

## 2025-02-12 ENCOUNTER — TELEPHONE (OUTPATIENT)
Dept: FAMILY MEDICINE | Facility: CLINIC | Age: 60
End: 2025-02-12
Payer: MEDICARE

## 2025-02-12 VITALS
WEIGHT: 104.94 LBS | SYSTOLIC BLOOD PRESSURE: 124 MMHG | HEART RATE: 84 BPM | RESPIRATION RATE: 18 BRPM | HEIGHT: 55 IN | TEMPERATURE: 98 F | OXYGEN SATURATION: 92 % | DIASTOLIC BLOOD PRESSURE: 66 MMHG | BODY MASS INDEX: 24.29 KG/M2

## 2025-02-12 LAB
ALBUMIN SERPL BCP-MCNC: 2.8 G/DL (ref 3.5–5.2)
ALP SERPL-CCNC: 306 U/L (ref 40–150)
ALT SERPL W/O P-5'-P-CCNC: 24 U/L (ref 10–44)
ANION GAP SERPL CALC-SCNC: 13 MMOL/L (ref 8–16)
AST SERPL-CCNC: 37 U/L (ref 10–40)
BASOPHILS # BLD AUTO: 0.07 K/UL (ref 0–0.2)
BASOPHILS NFR BLD: 1.8 % (ref 0–1.9)
BILIRUB SERPL-MCNC: 0.5 MG/DL (ref 0.1–1)
BUN SERPL-MCNC: 47 MG/DL (ref 6–20)
CALCIUM SERPL-MCNC: 8.8 MG/DL (ref 8.7–10.5)
CHLORIDE SERPL-SCNC: 99 MMOL/L (ref 95–110)
CO2 SERPL-SCNC: 23 MMOL/L (ref 23–29)
CREAT SERPL-MCNC: 3.6 MG/DL (ref 0.5–1.4)
DIFFERENTIAL METHOD BLD: ABNORMAL
EOSINOPHIL # BLD AUTO: 0.2 K/UL (ref 0–0.5)
EOSINOPHIL NFR BLD: 4.5 % (ref 0–8)
ERYTHROCYTE [DISTWIDTH] IN BLOOD BY AUTOMATED COUNT: 18.2 % (ref 11.5–14.5)
EST. GFR  (NO RACE VARIABLE): 14 ML/MIN/1.73 M^2
GLUCOSE SERPL-MCNC: 143 MG/DL (ref 70–110)
HCT VFR BLD AUTO: 34.1 % (ref 37–48.5)
HGB BLD-MCNC: 10.5 G/DL (ref 12–16)
IMM GRANULOCYTES # BLD AUTO: 0.01 K/UL (ref 0–0.04)
IMM GRANULOCYTES NFR BLD AUTO: 0.3 % (ref 0–0.5)
LYMPHOCYTES # BLD AUTO: 0.8 K/UL (ref 1–4.8)
LYMPHOCYTES NFR BLD: 19.6 % (ref 18–48)
MAGNESIUM SERPL-MCNC: 2 MG/DL (ref 1.6–2.6)
MCH RBC QN AUTO: 30.6 PG (ref 27–31)
MCHC RBC AUTO-ENTMCNC: 30.8 G/DL (ref 32–36)
MCV RBC AUTO: 99 FL (ref 82–98)
MONOCYTES # BLD AUTO: 0.8 K/UL (ref 0.3–1)
MONOCYTES NFR BLD: 20.1 % (ref 4–15)
NEUTROPHILS # BLD AUTO: 2.1 K/UL (ref 1.8–7.7)
NEUTROPHILS NFR BLD: 53.7 % (ref 38–73)
NRBC BLD-RTO: 0 /100 WBC
PHOSPHATE SERPL-MCNC: 5.3 MG/DL (ref 2.7–4.5)
PLATELET # BLD AUTO: 234 K/UL (ref 150–450)
PMV BLD AUTO: 10.9 FL (ref 9.2–12.9)
POCT GLUCOSE: 103 MG/DL (ref 70–110)
POCT GLUCOSE: 112 MG/DL (ref 70–110)
POCT GLUCOSE: 113 MG/DL (ref 70–110)
POCT GLUCOSE: 133 MG/DL (ref 70–110)
POTASSIUM SERPL-SCNC: 3.9 MMOL/L (ref 3.5–5.1)
PROT SERPL-MCNC: 7.8 G/DL (ref 6–8.4)
RBC # BLD AUTO: 3.43 M/UL (ref 4–5.4)
SODIUM SERPL-SCNC: 135 MMOL/L (ref 136–145)
WBC # BLD AUTO: 3.98 K/UL (ref 3.9–12.7)

## 2025-02-12 PROCEDURE — 25000003 PHARM REV CODE 250: Mod: HCNC

## 2025-02-12 PROCEDURE — G0378 HOSPITAL OBSERVATION PER HR: HCPCS | Mod: HCNC

## 2025-02-12 PROCEDURE — 63600175 PHARM REV CODE 636 W HCPCS: Mod: HCNC

## 2025-02-12 PROCEDURE — 84100 ASSAY OF PHOSPHORUS: CPT | Mod: HCNC

## 2025-02-12 PROCEDURE — 85025 COMPLETE CBC W/AUTO DIFF WBC: CPT | Mod: HCNC

## 2025-02-12 PROCEDURE — 80053 COMPREHEN METABOLIC PANEL: CPT | Mod: HCNC

## 2025-02-12 PROCEDURE — 63600175 PHARM REV CODE 636 W HCPCS: Mod: HCNC | Performed by: NURSE PRACTITIONER

## 2025-02-12 PROCEDURE — 83735 ASSAY OF MAGNESIUM: CPT | Mod: HCNC

## 2025-02-12 PROCEDURE — 36415 COLL VENOUS BLD VENIPUNCTURE: CPT | Mod: HCNC

## 2025-02-12 PROCEDURE — G0257 UNSCHED DIALYSIS ESRD PT HOS: HCPCS | Mod: HCNC

## 2025-02-12 RX ORDER — TRAZODONE HYDROCHLORIDE 50 MG/1
50 TABLET ORAL NIGHTLY PRN
Start: 2025-02-12 | End: 2025-05-13

## 2025-02-12 RX ORDER — FAMOTIDINE 20 MG/1
20 TABLET, FILM COATED ORAL EVERY OTHER DAY
Status: DISCONTINUED | OUTPATIENT
Start: 2025-02-13 | End: 2025-02-12 | Stop reason: HOSPADM

## 2025-02-12 RX ORDER — HEPARIN SODIUM 1000 [USP'U]/ML
1000 INJECTION, SOLUTION INTRAVENOUS; SUBCUTANEOUS
Status: DISCONTINUED | OUTPATIENT
Start: 2025-02-12 | End: 2025-02-12 | Stop reason: HOSPADM

## 2025-02-12 RX ADMIN — ATORVASTATIN CALCIUM 80 MG: 40 TABLET, FILM COATED ORAL at 08:02

## 2025-02-12 RX ADMIN — THERA TABS 1 TABLET: TAB at 08:02

## 2025-02-12 RX ADMIN — HEPARIN SODIUM 1000 UNITS: 1000 INJECTION, SOLUTION INTRAVENOUS; SUBCUTANEOUS at 11:02

## 2025-02-12 RX ADMIN — CINACALCET HYDROCHLORIDE 30 MG: 30 TABLET, FILM COATED ORAL at 05:02

## 2025-02-12 RX ADMIN — CARVEDILOL 3.12 MG: 3.12 TABLET, FILM COATED ORAL at 08:02

## 2025-02-12 RX ADMIN — CLOPIDOGREL BISULFATE 75 MG: 75 TABLET ORAL at 08:02

## 2025-02-12 RX ADMIN — CARVEDILOL 3.12 MG: 3.12 TABLET, FILM COATED ORAL at 05:02

## 2025-02-12 RX ADMIN — SEVELAMER CARBONATE 2.4 G: 2400 POWDER, FOR SUSPENSION ORAL at 05:02

## 2025-02-12 RX ADMIN — HYDRALAZINE HYDROCHLORIDE 10 MG: 10 TABLET, FILM COATED ORAL at 08:02

## 2025-02-12 RX ADMIN — SODIUM BICARBONATE 650 MG TABLET 650 MG: at 03:02

## 2025-02-12 RX ADMIN — SEVELAMER CARBONATE 2.4 G: 2400 POWDER, FOR SUSPENSION ORAL at 08:02

## 2025-02-12 RX ADMIN — ISOSORBIDE DINITRATE 5 MG: 5 TABLET ORAL at 01:02

## 2025-02-12 RX ADMIN — SODIUM BICARBONATE 650 MG TABLET 650 MG: at 08:02

## 2025-02-12 RX ADMIN — FUROSEMIDE 40 MG: 40 TABLET ORAL at 05:02

## 2025-02-12 RX ADMIN — CALCITRIOL 0.5 MCG: 0.5 CAPSULE, LIQUID FILLED ORAL at 08:02

## 2025-02-12 RX ADMIN — SEVELAMER CARBONATE 2.4 G: 2400 POWDER, FOR SUSPENSION ORAL at 01:02

## 2025-02-12 RX ADMIN — SACUBITRIL AND VALSARTAN 1 TABLET: 24; 26 TABLET, FILM COATED ORAL at 08:02

## 2025-02-12 RX ADMIN — APIXABAN 5 MG: 5 TABLET, FILM COATED ORAL at 08:02

## 2025-02-12 RX ADMIN — FUROSEMIDE 40 MG: 40 TABLET ORAL at 08:02

## 2025-02-12 RX ADMIN — PREGABALIN 75 MG: 75 CAPSULE ORAL at 05:02

## 2025-02-12 RX ADMIN — ACETAMINOPHEN 650 MG: 325 TABLET ORAL at 11:02

## 2025-02-12 NOTE — PLAN OF CARE
Problem: Hemodialysis  Goal: Safe, Effective Therapy Delivery  Outcome: Progressing     Problem: Hemodialysis  Goal: Effective Tissue Perfusion  Outcome: Progressing

## 2025-02-12 NOTE — PROGRESS NOTES
Pharmacist Renal Dose Adjustment Note    Suyapa Connelly is a 59 y.o. female being treated with the medication Famotidine    Patient Data:    Vital Signs (Most Recent):  Temp: 97.6 °F (36.4 °C) (02/12/25 0745)  Pulse: 87 (02/12/25 0747)  Resp: 18 (02/12/25 0747)  BP: (!) 198/71 (02/12/25 0747)  SpO2: 95 % (02/12/25 0747) Vital Signs (72h Range):  Temp:  [96.7 °F (35.9 °C)-99.1 °F (37.3 °C)]   Pulse:  [68-98]   Resp:  [16-18]   BP: (119-198)/()   SpO2:  [95 %-100 %]      Recent Labs   Lab 02/10/25  2139 02/11/25  0404 02/12/25  0259   CREATININE 2.8* 3.1* 3.6*     Serum creatinine: 3.6 mg/dL (H) 02/12/25 0259  Estimated creatinine clearance: 10.3 mL/min (A)    Famotidine 20 mg daily will be changed to Famotidine 20 mg every other day    Pharmacist's Name: Yury Grewal  Pharmacist's Extension: [phone]

## 2025-02-12 NOTE — TELEPHONE ENCOUNTER
Spoke with  to get f/u scheduled.  During phone call he mentioned that his insurance changed and just wanted to let us know that he has to change providers from [facility].  I made sure that Ms. Connelly still had same insurance and he stated that hers hadn't changed and she will still be seeing Dr Noel.

## 2025-02-12 NOTE — PROGRESS NOTES
Pt transferred from ED and placed in 721. Pt is AAOx4. Oriented to nurse, room, and call light. Assessment performed. No skin breakdown noted. Pt has bilateral AKA's. VSS. All questions answered at this time.  at bedside. Will cont to sarmad pt.

## 2025-02-12 NOTE — NURSING
1537 --Discharge instructions given and reviewed with patient and  .  Verbalized understanding.  Made aware of medication changes.  Verbalized understanding.  Respirations even and unlabored upon discharge.  No complaints of pain and discomfort.  Awaiting ambulance transportation home.       1817 PIV x1  removed. Catheter tip intact.  Pressure applied.  No bleeding noted.   Left subclavian dialysis access remains in place.  Dressing clean dry and intact. Telemetry discontinued and monitor returned. Off unit via stretcher with [facility] ambulance  and .

## 2025-02-12 NOTE — PROGRESS NOTES
HD TX completed.  TX time 3.5 hrs.  Net fluid removed 3 liters.  VSS.  Tolerated HD.  Saline flushed, hep locked HD catheter.  Report given to primary nurse.  Returned to room by stretcher.

## 2025-02-12 NOTE — TELEPHONE ENCOUNTER
----- Message from Bipin sent at 2/12/2025  9:44 AM CST -----  Contact: Case managment  Type:  Sooner Apoointment Request    Caller is requesting a sooner appointment.  Caller declined first available appointment listed below.  Caller will not accept being placed on the waitlist and is requesting a message be sent to doctor.  Name of Caller: Case Management   When is the first available appointment? N/a  Symptoms: hosp f/u 1 week   Would the patient rather a call back or a response via [facility]? Call   Best Call Back Number:[phone]   Additional Information:

## 2025-02-12 NOTE — PLAN OF CARE
Plan of care reviewed with patient. Pt is afebrile. Free from falls or injury. No complaints of pain. Merropenem given as scheduled. Trazodone given at bedtime. Bed locked in lowest position, non skid socks on, call light within reach. Pt instructed to call if any assistance is needed. Vitals stable.  at bedside. Will cont to sarmad pt.

## 2025-02-12 NOTE — PROGRESS NOTES
[facility] H[facility] NOTE     Patient currently on hemodialysis for removal of uremic toxins .     Patient seen and evaluated on hemodialysis, tolerating treatment, see HD flowsheet for vitals and assessments.      No Hypotension, chest pain, shortness of breath, cramping, nausea or vomiting.     Following patient for the management of ESRD    Target UF: 3 L as tolerated, keep MAP >65.  Admitted with concerns for LLE cellulitis. Vascular consulted with no surgical intervention plans. Possible plans for dc today.   Will consider extra treatment for volume removal if patient remains inpatient. If not, can arrange OP.   Hgb 10.5 - EPO can be administered and dosed per his OP unit upon discharge.  Continue Sevelamer  Continue Calcitriol   Consider renal diet restrictions; please limit daily intake to 32 oz per day.   No lab stick/BP intake on access site  Continue to monitor intake and output, daily weights   Please avoid gadolinium, fleets, phos-based laxatives, NSAIDs  Will follow closely and continue dialysis treatments while in-patient    KATHLEEN Faulkner DNP, APRN, FNP-C  Department of Nephrology  [facility]  Pager: [phone]

## 2025-02-12 NOTE — PLAN OF CARE
02/12/25 1444   Post-Acute Status   Post-Acute Authorization Home Health   Home Health Status Set-up Complete/Auth obtained   Other Status  --      Pt is current with [facility] and will resume services.    EDUARD arranged stretcher transport to [address] [place] [address], [address], [place] [address] via [facility]. Requested  time is 3:45PM. Requested  time does not guarantee arrival time..    EDUARD will continue to follow up.      Nelly Durand LMSW  [facility]  Ext. [phone]

## 2025-02-12 NOTE — PROGRESS NOTES
Patient arrived by stretcher.  Pt awake, alert and responsive.  VSS.   HD TX started via left subclavian cvc.

## 2025-02-12 NOTE — PLAN OF CARE
CHW was unable to schedule a 1 week hospital follow message was sent to pt pcp scheduling team to contact pt for a appointment as soon as possible.

## 2025-02-13 ENCOUNTER — PATIENT OUTREACH (OUTPATIENT)
Dept: ADMINISTRATIVE | Facility: CLINIC | Age: 60
End: 2025-02-13
Payer: MEDICARE

## 2025-02-13 RX ORDER — SEMAGLUTIDE 0.68 MG/ML
0.5 INJECTION, SOLUTION SUBCUTANEOUS
Qty: 3 ML | Refills: 0 | Status: SHIPPED | OUTPATIENT
Start: 2025-02-13 | End: 2025-03-15

## 2025-02-13 RX ORDER — INSULIN ASPART 100 [IU]/ML
5 INJECTION, SOLUTION INTRAVENOUS; SUBCUTANEOUS
Start: 2025-02-13 | End: 2026-02-13

## 2025-02-13 NOTE — TELEPHONE ENCOUNTER
The patient's spouse states patient is taking insulin glargine U-100, Lantus, 100 units/ml (3 ml) SubQ InPn pen Inject 8 (eight) UNITS nightly and trazodone (DESYREL) 50 mg tablet take 2 (two) tablets by mouth every hour of sleep.

## 2025-02-13 NOTE — ASSESSMENT & PLAN NOTE
Cellulitis ruled out    58 yo F with PMHx of ESRD on HD (MWF), T2DM, severe PAD s/p bilateral AKA c/b poor healing, L common iliac stent, CAD s/p CABG, HFrEF (EF 35-40%, recovered from 15%), HLD, HTN, obesity, pAF, depression, CAROLIN, mod-sev TR who presented for severe LLE pain for one day. On exam, her LLE is swollen, ttp, and yellow drainage is present to wound bed. Afebrile and without leukocytosis. ESR >120 and CRP 21. Wound vac was removed today per home health wound care nurse.     - started on IV vancomycin in ED, will continue and start cefepime as well   - Vascular surgery consulted; appreciate recs  - DVT US pending   - XR L femur pending   - MM pain control  - wound care consulted
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
## 2025-02-13 NOTE — PROGRESS NOTES
C3 nurse spoke with Suyapa Connelly and patient's spouse, Randell,  for a TCC post hospital discharge follow up call. The patient has a scheduled HOSFU appointment with Vanessa Noel MD  on 02/19/2025 @ 11 AM.

## 2025-02-13 NOTE — TELEPHONE ENCOUNTER
----- Message from [employer] sent at 2/13/2025  2:05 PM CST -----  Type:  RX Refill Request    Who Called: Pt's   Refill or New Rx:refill  RX Name and Strength:semaglutide (OZEMPIC) 0.25 mg or 0.5 mg (2 mg/3 mL) pen injector  How is the patient currently taking it? (ex. 1XDay):every 7 days  Is this a 30 day or 90 day RX:  Preferred Pharmacy with phone number:[facility]   Phone: [phone]  Fax: [phone]  Local or Mail Order:mail order  Ordering Provider:Bert  Would the patient rather a call back or a response via [facility]? Call back  Best Call Back Number:[phone]   Additional Information:

## 2025-02-13 NOTE — HOSPITAL COURSE
Admitted for LLE pain with concern for cellulitis.  Started on broad spectrum abx.  White count was normal, no fevers.  She has chronic would to L stump.  ESR was significantly elevated, so MR was obtained which was negative for osteo and showed nonspecific edema.  She did have a UA that was concerning for possible infection but in the absence of urinary symptoms ID recommended against treatment.  She underwent one session of HD during admission per her regular MWF schedule.  Pt deemed appropriate for discharge; seen and examined prior to departure.  Plan discussed with pt, who was agreeable and amenable; medications were discussed and reviewed, outpatient follow-up scheduled, ER precautions were given, all questions were answered to the pt's satisfaction, and was subsequently discharged.

## 2025-02-13 NOTE — PLAN OF CARE
[facility] - [facility] 11H  Discharge Final Note    Primary Care Provider: Vanessa Noel MD    Expected Discharge Date: 2/12/2025    Patient discharged to home via ambulance transportation.     Patient's bedside nurse and patient notified of the above.    Discharge Plan A and Plan B have been determined by review of patient's clinical status, future medical and therapeutic needs, and coverage/benefits for post-acute care in coordination with multidisciplinary team members.        Final Discharge Note (most recent)       Final Note - 02/13/25 0857          Final Note    Assessment Type Final Discharge Note     Anticipated Discharge Disposition Home-Health Care Svc (P)         Post-Acute Status    Post-Acute Authorization Home Health (P)      Home Health Status Set-up Complete/Auth obtained (P)                      Important Message from Medicare                 Future Appointments   Date Time Provider Department Center   2/19/2025 11:00 AM Vanessa Noel MD [facility] [place]   3/6/2025 11:40 AM Ephraim Blankenship MD [facility] [place]   3[facility]19[facility]2025 11:20 [facility]   3/25/2025  9:30 AM Buzz Manjarrez NP [facility] [place]        scheduled post-discharge follow-up appointment and information added to AVS.     Patient is current with [facility] and will resume services.      Nelly Durand LMSW  [facility]  Ext. [phone]

## 2025-02-13 NOTE — TELEPHONE ENCOUNTER
----- Message from [employer] sent at 2/13/2025  2:13 PM CST -----  Type:  RX Refill Request    Who Called: pt  Refill or New Rx:refill  RX Name and Strength:insulin aspart U-100 (NOVOLOG) 100 unit/mL (3 mL) InPn pen  How is the patient currently taking it? (ex. 1XDay):5 units in skin before meals  Is this a 30 day or 90 day RX:  Preferred Pharmacy with phone number:[facility]   Phone: [phone]  Fax: [phone]  Local or Mail Order:mail order  Ordering Provider:ricardo  Would the patient rather a call back or a response via [facility]? Call back  Best Call Back Number:[phone]   Additional Information:

## 2025-02-14 LAB — BACTERIA UR CULT: ABNORMAL

## 2025-02-17 ENCOUNTER — OUTPATIENT CASE MANAGEMENT (OUTPATIENT)
Dept: ADMINISTRATIVE | Facility: OTHER | Age: 60
End: 2025-02-17
Payer: MEDICARE

## 2025-02-19 RX ORDER — INSULIN ASPART 100 [IU]/ML
3 INJECTION, SOLUTION INTRAVENOUS; SUBCUTANEOUS
Qty: 3 ML | Refills: 10 | Status: SHIPPED | OUTPATIENT
Start: 2025-02-19 | End: 2026-02-19

## 2025-02-20 ENCOUNTER — OFFICE VISIT (OUTPATIENT)
Dept: CARDIOLOGY | Facility: CLINIC | Age: 60
End: 2025-02-20
Payer: MEDICARE

## 2025-02-20 VITALS
HEART RATE: 90 BPM | BODY MASS INDEX: 32.02 KG/M2 | DIASTOLIC BLOOD PRESSURE: 85 MMHG | HEIGHT: 55 IN | OXYGEN SATURATION: 96 % | SYSTOLIC BLOOD PRESSURE: 156 MMHG

## 2025-02-20 DIAGNOSIS — I50.42 CHRONIC COMBINED SYSTOLIC AND DIASTOLIC CONGESTIVE HEART FAILURE: ICD-10-CM

## 2025-02-20 DIAGNOSIS — E83.9 CHRONIC KIDNEY DISEASE-MINERAL BONE DISORDER (CKD-MBD) WITH STAGE 5 CHRONIC KIDNEY DISEASE, ON CHRONIC DIALYSIS: ICD-10-CM

## 2025-02-20 DIAGNOSIS — Z99.2 CHRONIC KIDNEY DISEASE-MINERAL BONE DISORDER (CKD-MBD) WITH STAGE 5 CHRONIC KIDNEY DISEASE, ON CHRONIC DIALYSIS: ICD-10-CM

## 2025-02-20 DIAGNOSIS — I25.10 CORONARY ARTERY DISEASE, UNSPECIFIED VESSEL OR LESION TYPE, UNSPECIFIED WHETHER ANGINA PRESENT, UNSPECIFIED WHETHER NATIVE OR TRANSPLANTED HEART: ICD-10-CM

## 2025-02-20 DIAGNOSIS — I73.9 PERIPHERAL VASCULAR DISEASE: ICD-10-CM

## 2025-02-20 DIAGNOSIS — N18.5 CHRONIC KIDNEY DISEASE-MINERAL BONE DISORDER (CKD-MBD) WITH STAGE 5 CHRONIC KIDNEY DISEASE, ON CHRONIC DIALYSIS: ICD-10-CM

## 2025-02-20 DIAGNOSIS — I48.0 PAROXYSMAL ATRIAL FIBRILLATION: Primary | ICD-10-CM

## 2025-02-20 DIAGNOSIS — M89.9 CHRONIC KIDNEY DISEASE-MINERAL BONE DISORDER (CKD-MBD) WITH STAGE 5 CHRONIC KIDNEY DISEASE, ON CHRONIC DIALYSIS: ICD-10-CM

## 2025-02-20 DIAGNOSIS — Z95.1 S/P CABG X 1: ICD-10-CM

## 2025-02-20 NOTE — PROGRESS NOTES
Subjective:   @Patient ID:  Suyapa Connelly is a 59 y.o. female who presents for follow-up of No chief complaint on file.      HPI:     Patient is a 59-year-old female, here for evaluation for possible left heart catheterization, active medical problems include      -CAD status post bypass with LIMA to the LAD, this was a part of hybrid procedure and patient had PCI of the left circumflex artery afterwards.  Previous angiogram showed small-sized RCA with LAD wrapping around the apex and supplying the inferior wall, recent stress test showed inferior/inferolateral scar with rico-infarct ischemia.  No chest pain or exertional symptoms reported  -CKD on hemodialysis Monday Wednesday Friday  -peripheral arterial disease status post BKA bilaterally  -diabetes mellitus      Patient on a wheelchair.  EF has significantly improved from 15% in May of 2020 4-35 to 40% on previous echocardiogram.  EF reported to be 47% on nuclear Lexiscan stress test.  Mild area of reversibility noted around previous infarct.  No chest pain or pressure.      Results for orders placed during the hospital encounter of 11/15/24    Echo    Interpretation Summary    Limited study to assess LV function and wall motion abnormalities.    Limited echocardiography to assess EF and wall motion    Left Ventricle: The left ventricle is mildly dilated measuring 5.4 cm. There is mild eccentric hypertrophy. There is hypokinesis of the anterior and inferior septum. There is moderately reduced systolic function. Ejection fraction is approximately 35-40%.    Right Ventricle: Severe right ventricular enlargement. Systolic function is severely reduced.    Left Atrium: Left atrium is severely dilated. Atrial septum is bulging to the left.    Right Atrium: Right atrium is mildly dilated.    Tricuspid Valve: There is moderate to severe regurgitation.    Pulmonary Artery: The estimated pulmonary artery systolic pressure is 22 mmHg. This may be underestimated due to  increase right atrial pressure.    IVC/SVC: Elevated venous pressure at 15 mmHg. The IVC measures 3.5 cm in diameter.      Results for orders placed during the hospital encounter of 02/04/25    Nuclear Stress Test    Interpretation Summary    The study's ECG is uninterpretable due to baseline ECG abnormalites.    The patient reported chest pain during the stress test.    During stress, occasional PVCs are noted.    The patient was infused intravenously with regadenoson at 0.08 mg/ml for a total duration of 10 seconds. A total regadenoson dose of 0.4 mg was injected. The patient reported chest discomfort during the stress test.    The ECG portion of the study is uninterpretable due to baseline ECG abnormalities.    Stress ECG: Persistent inferolateral 1 mm ST depressions with T-wave inversions. During stress, occasional PVCs are noted.    Baseline ECG: The Baseline ECG reveals sinus rhythm, PACs and PVCs. The baseline ECG has non-specific ST-T wave changes.      Results for orders placed during the hospital encounter of 05/01/23    Cardiac catheterization    Narrative  Procedure performed in the Invasive Lab  - See Procedure Log link below for nursing documentation  - See OpNote on Surgeries Tab for physician findings  - See Imaging Tab for radiologist dictation      Please document below the medical necessity for continuous telemetry monitoring or discontinue the current order if appropriate.    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, on chronic dialysis 11/29/2024    Left thigh pain 11/14/2024    Open thigh wound, left, initial encounter 10/28/2024    Cellulitis of left lower extremity 10/28/2024    Social problem 10/25/2024    Screening for cervical cancer 10/21/2024    Need for vaccination 10/21/2024     Mastodynia 10/21/2024    Need for Tdap vaccination 10/21/2024    Type 2 diabetes mellitus with chronic kidney disease on chronic dialysis, with long-term current use of insulin 10/21/2024    Atherosclerosis of native coronary artery of native heart with angina pectoris 10/21/2024    Breast asymmetry 10/21/2024    Psychophysiological insomnia 10/21/2024    Postconcussive syndrome 06/12/2024    Discharge planning issues 05/31/2024    Confusion 05/28/2024    Thrombocytopenia 05/14/2024     , trend daily      Severe obesity (BMI >= 40) 05/14/2024     BMI 45      Irritant contact dermatitis due to friction or contact with body fluids, unspecified 05/14/2024    Pressure injury of sacral region, unstageable 05/14/2024    Unresponsive 02/05/2024    Depression 12/26/2023    Hypoglycemia 12/02/2023    Morbid obesity with BMI of 50.0-59.9, adult 05/30/2023    Uremic encephalopathy 05/25/2023    Acute metabolic encephalopathy 05/25/2023    Chronic kidney disease-mineral and bone disorder 05/24/2023    Myalgia 05/12/2023    ESRD (end stage renal disease) on dialysis 04/21/2023    S/P AKA (above knee amputation) bilateral 03/26/2022    Hyperkalemia 03/26/2022     K 5.7, ESRD patient, improved with HD      Uses self-applied continuous glucose monitoring device 03/20/2022    Debility 02/12/2022    Dermatitis associated with moisture 02/07/2022    Myoclonus 01/31/2022    Septic shock 01/17/2022    Impaired eye movement 10/18/2021    Peripheral neuropathic pain 09/22/2021    Muscle wasting and atrophy, not elsewhere classified, right thigh  08/11/2021    Phantom limb syndrome with pain 07/12/2021    Postmenopausal bleeding 06/30/2021    Uterine fibroid 06/30/2021    Chronic combined systolic and diastolic congestive heart failure 06/21/2021    Hypoalbuminemia 06/19/2021    Adjustment disorder with mixed anxiety and depressed mood 06/16/2021    Vascular graft infection 06/01/2021    Encephalopathy 05/20/2021    Uremia      Nephrotic syndrome 05/11/2021    Impaired functional mobility and endurance 05/06/2021    Thrombosis of femoro-popliteal bypass graft 04/29/2021    S/P femoral-popliteal bypass surgery 08/21/2020    Hyponatremia 07/22/2020    Paroxysmal atrial fibrillation 01/28/2020    S/P CABG x 1 01/27/2020    Anemia due to chronic kidney disease, on chronic dialysis 01/27/2020    Type 2 diabetes mellitus with hyperglycemia, with long-term current use of insulin 01/02/2020    CAD (coronary artery disease) 01/02/2020     Formatting of this note might be different from the original.  Last Assessment & Plan:   Formatting of this note might be different from the original.  -- Optimize glucose control.      Acute cystitis without hematuria 06/02/2018    CAROLIN (generalized anxiety disorder) 05/07/2018    Vitamin D deficiency 05/06/2018    Hypocalcemia 05/05/2018    Peripheral vascular disease 10/06/2015                    LAST HbA1c  Lab Results   Component Value Date    HGBA1C 5.5 02/11/2025       Lipid panel  Lab Results   Component Value Date    CHOL 157 05/28/2024    CHOL 123 12/23/2023    CHOL 205 (H) 05/03/2023     Lab Results   Component Value Date    HDL 48 05/28/2024    HDL 51 12/23/2023    HDL 44 05/03/2023     Lab Results   Component Value Date    LDLCALC 82.4 05/28/2024    LDLCALC 57 12/23/2023    LDLCALC 129.2 05/03/2023     Lab Results   Component Value Date    TRIG 133 05/28/2024    TRIG 73 12/23/2023    TRIG 159 (H) 05/03/2023     Lab Results   Component Value Date    CHOLHDL 30.6 05/28/2024    CHOLHDL 21.5 05/03/2023    CHOLHDL 20.7 12/03/2021            Review of Systems   Constitutional: Negative for chills and fever.   HENT:  Negative for hearing loss and nosebleeds.    Eyes:  Negative for blurred vision.   Cardiovascular:         As in HPI    Respiratory:  Negative for cough, hemoptysis and shortness of breath.    Endocrine: Negative for cold intolerance and polyuria.   Hematologic/Lymphatic: Negative for bleeding  problem.   Skin:  Negative for itching.   Musculoskeletal:  Negative for falls.   Gastrointestinal:  Negative for abdominal pain and hematochezia.   Genitourinary:  Negative for hematuria.   Neurological:  Negative for dizziness and loss of balance.   Psychiatric/Behavioral:  Negative for altered mental status and depression.        Objective:   Physical Exam  Constitutional:       Appearance: She is well-developed.   HENT:      Head: Normocephalic and atraumatic.   Eyes:      Conjunctiva/sclera: Conjunctivae normal.   Neck:      Vascular: No carotid bruit or JVD.   Cardiovascular:      Rate and Rhythm: Normal rate and regular rhythm.      Pulses:           Carotid pulses are 2+ on the right side and 2+ on the left side.       Radial pulses are 2+ on the right side and 2+ on the left side.      Heart sounds: Normal heart sounds. No murmur heard.     No friction rub. No gallop.   Pulmonary:      Effort: Pulmonary effort is normal. No respiratory distress.      Breath sounds: Normal breath sounds. No stridor. No wheezing.   Abdominal:      General: Abdomen is flat.      Palpations: Abdomen is soft.   Musculoskeletal:      Cervical back: Neck supple.      Right lower leg: No edema.      Left lower leg: No edema.      Comments: Bilateral BKA.   Skin:     General: Skin is warm and dry.   Neurological:      Mental Status: She is alert and oriented to person, place, and time.   Psychiatric:         Behavior: Behavior normal.         Assessment:     1. Paroxysmal atrial fibrillation    2. Chronic combined systolic and diastolic congestive heart failure    3. S/P CABG x 1    4. Peripheral vascular disease    5. Coronary artery disease, unspecified vessel or lesion type, unspecified whether angina present, unspecified whether native or transplanted heart    6. 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  She is on goal-directed medical therapy with Coreg, hydralazine, Imdur, Entresto.  She has had significant improvement in left ventricular ejection fraction with goal-directed medical therapy and PCI/CABG.  I have reviewed the nuclear images.  There is not a lot of area of rico-infarct ischemia and patient is not having any chest pain or exertional symptoms.  In the absence of symptoms and with history of infarction in the inferior wall I recommend against invasive angiogram/PCI at this point.  -patient is on Eliquis 2.5 mg twice a day for history of atrial fibrillation.  Continue Plavix as per primary cardiology service.  If high risk of bleeding can transition to aspirin 81 mg daily.  -goal LDL less than 55.  Continue Lipitor 80 mg daily  -follow up with the Buzz lind NP in [facility]iology [facility]    Pertinent cardiac images and EKG reviewed independently.    Continue with current medical plan and lifestyle changes.  Return sooner for concerns or questions. If symptoms persist go to the ED  I have reviewed all pertinent data including patient's medical history in detail and updated the computerized patient record.     No orders of the defined types were placed in this encounter.      Follow up as scheduled.     She expressed verbal understanding and agreed with the plan    Patient's Medications   New Prescriptions    No medications on file   Previous Medications    ACETAMINOPHEN (TYLENOL) 325 MG TABLET    Take 2 tablets (650 mg total) by mouth every 8 (eight) hours as needed for Pain.    ALLOPURINOL (ZYLOPRIM) 100 MG TABLET    TAKE 1/2 TABLET(50 MG) BY MOUTH EVERY OTHER DAY    APIXABAN (ELIQUIS) 2.5 MG TAB    Take 1 tablet (2.5 mg total) by mouth 2 (two) times daily.    ATORVASTATIN (LIPITOR) 80 MG TABLET    Take 1 tablet (80 mg total) by mouth once daily.    BLOOD SUGAR DIAGNOSTIC STRP    1 each by Misc.(Non-Drug; Combo Route) route 4 (four) times daily before meals and nightly.    BLOOD-GLUCOSE  METER MISC    Before meals and at bedtime    BLOOD-GLUCOSE SENSOR (DEXCOM G7 SENSOR) ANANTH    1 each by Misc.(Non-Drug; Combo Route) route once daily.    CALCITRIOL (ROCALTROL) 0.5 MCG CAP    Take 1 capsule (0.5 mcg total) by mouth once daily.    CARVEDILOL (COREG) 6.25 MG TABLET    Take 6.25 mg by mouth 2 (two) times daily.    CINACALCET (SENSIPAR) 30 MG TAB    Take 1 tablet (30 mg total) by mouth every Mon, Wed, Fri.    CLOPIDOGREL (PLAVIX) 75 MG TABLET    Take 1 tablet (75 mg total) by mouth once daily.    FAMOTIDINE (PEPCID) 20 MG TABLET    TAKE 1 TABLET(20 MG) BY MOUTH TWICE DAILY    FUROSEMIDE (LASIX) 40 MG TABLET    Take 1 tablet (40 mg total) by mouth 2 (two) times daily.    HYDRALAZINE (APRESOLINE) 10 MG TABLET    Take 1 tablet (10 mg total) by mouth every 12 (twelve) hours.    INSULIN ASPART U-100 (NOVOLOG FLEXPEN U-100 INSULIN) 100 UNIT/ML (3 ML) INPN PEN    Inject 3 Units into the skin 3 (three) times daily with meals.    INSULIN ASPART U-100 (NOVOLOG) 100 UNIT/ML (3 ML) INPN PEN    Inject 5 Units into the skin before meals and at bedtime as needed (Hyperglycemia). **MODERATE CORRECTION DOSE**  Blood Glucose  mg/dL                  Pre-meal                2200  151-200                2 units                    1 unit  201-250                4 units                    2 units  251-300                6 units                    3 units  301-350                8 units                    4 units  >350                     10 units                  5 units  Administer subcutaneously if needed at times designated by monitoring  schedule.    INSULIN GLARGINE U-100, LANTUS, 100 UNIT/ML (3 ML) SUBQ INPN PEN    Inject 5 Units into the skin every evening.    ISOSORBIDE DINITRATE (ISORDIL) 10 MG TABLET    Take 0.5 tablets (5 mg total) by mouth 2 (two) times daily.    LANCETS 33 GAUGE MISC    TEST 3 TIMES DAILY    MULTIVIT WITH MIN-FOLIC ACID (ONE DAILY WOMENS 50 PLUS) 0.4 MG TAB    Take 1 tablet by mouth once daily.     "MUPIROCIN (BACTROBAN) 2 % OINTMENT    Apply topically 2 (two) times daily.    PEN NEEDLE, DIABETIC 33 GAUGE X 5/32" NDLE    1 each by Misc.(Non-Drug; Combo Route) route 4 (four) times daily before meals and nightly.    PREGABALIN (LYRICA) 75 MG CAPSULE    Take 1 capsule (75 mg total) by mouth Daily.    RENVELA 2.4 GRAM PWPK    Take 1 packet by mouth 3 (three) times daily with meals.    SACUBITRIL-VALSARTAN (ENTRESTO) 24-26 MG PER TABLET    Take 1 tablet by mouth 2 (two) times daily.    SEMAGLUTIDE (OZEMPIC) 0.25 MG OR 0.5 MG (2 MG/3 ML) PEN INJECTOR    Inject 0.5 mg into the skin every 7 days.    SERTRALINE (ZOLOFT) 100 MG TABLET    Take 1 tablet (100 mg total) by mouth once daily.    SODIUM BICARBONATE 650 MG TABLET    TAKE 1 TABLET(650 MG) BY MOUTH THREE TIMES DAILY    TRAZODONE (DESYREL) 50 MG TABLET    Take 1 tablet (50 mg total) by mouth nightly as needed for Insomnia.    ZINC OXIDE 10 % OINT    Apply 1 Application topically 2 (two) times a day.   Modified Medications    No medications on file   Discontinued Medications    No medications on file        Ephraim Blankenship M.D"

## 2025-02-21 ENCOUNTER — PATIENT OUTREACH (OUTPATIENT)
Dept: ADMINISTRATIVE | Facility: HOSPITAL | Age: 60
End: 2025-02-21
Payer: MEDICARE

## 2025-02-27 ENCOUNTER — TELEPHONE (OUTPATIENT)
Dept: FAMILY MEDICINE | Facility: CLINIC | Age: 60
End: 2025-02-27
Payer: MEDICARE

## 2025-02-27 NOTE — TELEPHONE ENCOUNTER
Spoke to pt  and he stated that the insulin for the pt will have to be tier 1 or 2 for it to be covered. I also inform him that DME will not be supplying a new commode because the order was filled in July 2024. I informed him that I will resend the order for a Brandee lift to DME.     I informed him that I would submit PA for both him and the pt for Novolog insulin. Informed pt that to receive emergency supply of insulin to go to emergency room if blood sugar is not controlled. Pt  stated verbal understanding and did not have any other questions or concerns.

## 2025-02-28 RX ORDER — INSULIN ASPART 100 [IU]/ML
5 INJECTION, SOLUTION INTRAVENOUS; SUBCUTANEOUS
Qty: 15 ML | Refills: 6 | Status: SHIPPED | OUTPATIENT
Start: 2025-02-28 | End: 2026-02-28

## 2025-02-28 RX ORDER — INSULIN ASPART 100 [IU]/ML
5 INJECTION, SOLUTION INTRAVENOUS; SUBCUTANEOUS
Start: 2025-02-28 | End: 2025-02-28

## 2025-03-03 DIAGNOSIS — G89.21 CHRONIC PAIN AFTER AMPUTATION: ICD-10-CM

## 2025-03-03 DIAGNOSIS — Z89.9 CHRONIC PAIN AFTER AMPUTATION: ICD-10-CM

## 2025-03-03 RX ORDER — SEMAGLUTIDE 0.68 MG/ML
0.5 INJECTION, SOLUTION SUBCUTANEOUS
Qty: 3 ML | Refills: 0 | Status: SHIPPED | OUTPATIENT
Start: 2025-03-03 | End: 2025-04-02

## 2025-03-03 RX ORDER — PREGABALIN 75 MG/1
75 CAPSULE ORAL DAILY
Qty: 7 CAPSULE | Refills: 0 | Status: SHIPPED | OUTPATIENT
Start: 2025-03-03

## 2025-03-03 NOTE — TELEPHONE ENCOUNTER
----- Message from Yady sent at 3/3/2025  8:49 AM CST -----  .Type:  RX Refill RequestWho Called: RAFAL Woodruff [[id number]] husbandRefill or New Rx:new rx RX Name and Strength:semaglutide (OZEMPIC) 0.25 mg or 0.5 mg (2 mg/3 mL) pen injectorHow is the patient currently taking it? (ex. 1XDay):1 xdayPreferred Pharmacy with phone number:[facility] [id number] [place] [address] [address]Local or Mail Order:localWould the patient rather a call back or a response via [facility]? Call [facility] Call Back Number:[phone] Additional Information:

## 2025-03-05 DIAGNOSIS — Z79.4 TYPE 2 DIABETES MELLITUS WITH HYPERGLYCEMIA, WITH LONG-TERM CURRENT USE OF INSULIN: Primary | ICD-10-CM

## 2025-03-05 DIAGNOSIS — E11.65 TYPE 2 DIABETES MELLITUS WITH HYPERGLYCEMIA, WITH LONG-TERM CURRENT USE OF INSULIN: Primary | ICD-10-CM

## 2025-03-06 ENCOUNTER — TELEPHONE (OUTPATIENT)
Dept: PHARMACY | Facility: CLINIC | Age: 60
End: 2025-03-06
Payer: MEDICARE

## 2025-03-06 ENCOUNTER — TELEPHONE (OUTPATIENT)
Dept: FAMILY MEDICINE | Facility: CLINIC | Age: 60
End: 2025-03-06
Payer: MEDICARE

## 2025-03-06 NOTE — TELEPHONE ENCOUNTER
Spoke to pt spouse and informed him that Dr Noel placed a referral for pharmacy assistance and that they may be able to decrease the cost of the insulin. Pt  also asked if Ozempic was called in for pt- I informed him it was called in on Monday. Pt spouse had no other questions/concerns.

## 2025-03-06 NOTE — LETTER
March 6, 2025    Suyapa Connelly  [address]  [place] [address]           Rama Mccarty. Suyapa Connelly     It was a pleasure speaking with you. To follow up on our conversation on 3/6/2025, the Pharmacy Patient Assistance Program needs more information from you before we can submit your Novolog Pens application to the [employer] Program. Please return the following documents to the Pharmacy Patient Assistance office ASAP.  Fax requested documentation to [phone] or email [email or web address]. Documentation can also be mailed to address listed below       Proof of household Income (such as social security award letter, pension statement or 3 consecutive pay stubs),   Copy of all insurance cards (front and back),  Completed Medication Access Center Authorization Forms (attached)       Whats Next:     Once I receive your documentation and authorization from your Provider, your application will be submitted to the respective Assistance Program for review. Please be advised it will take 2 to 4 weeks for your application to be processed so you may have to purchase a month's supply of medication from your pharmacy to hold you over during the waiting period. You will be notified of approval or denial by The Program(mail) or myself.      If you have any questions or concerns, please give me a call.         Sincerely   Fly ALY @[phone]  Pharmacy Patient Assistance  [id number]  [place] [address]  Fax: [phone]  Email: [email or web address]

## 2025-03-06 NOTE — TELEPHONE ENCOUNTER
Suyapa Connelly has been informed of the [employer] application process for Novolog Pens and what's required to apply.  She will provide the following documents: Proof of household Income (such as social security award letter, pension statement or 3 consecutive pay stubs), Copy of all insurance cards (front and back), and Completed Medication Access Center Authorization Forms      Follow-up will be made in 5 business days.     Fly Erickson   Pharmacy Patient Assistance Team     (Notes)  She is taking the novolog sliding scale (the 5 unit one) - thank you!

## 2025-03-06 NOTE — TELEPHONE ENCOUNTER
----- Message from Vanessa Noel MD sent at 3/5/2025  8:08 PM CST -----  I placed a referral  to pharmacy assistance  ----- Message -----  From: Noemi Lopez LPN  Sent: 2/27/2025  10:38 AM CST  To: Vanessa Noel MD    Please advise. Pt is requesting alternate to Insulin.  ----- Message -----  From: Fabiola Mccord  Sent: 2/27/2025   9:25 AM CST  To: [facility] Staff    Type:  CallWho Called:husbandDoes the patient know what this is regarding?:would like to discuss cheaper options or switching insulin aspart U-100 (NOVOLOG FLEXPEN U-100 INSULIN) 100 unit/mL (3 mL) InPn pen due to the high out of pocket costWould the patient rather a call back or a response via [facility]? Call [facility] Call Back Number: [phone] Information: Pt is requesting a bed side commode and a jany lift

## 2025-03-06 NOTE — TELEPHONE ENCOUNTER
----- Message from Jazz Kevin sent at 3/6/2025  9:05 AM CST -----  Regarding: FW: Order for RAFAL SHEPHERD    ----- Message -----  From: Vanessa Noel MD  Sent: 3/5/2025   8:08 PM CST  To: Pharmacy Patient Assistance Team  Subject: Order for RAFAL SHEPHERD

## 2025-03-09 DIAGNOSIS — I50.42 CHRONIC COMBINED SYSTOLIC AND DIASTOLIC CONGESTIVE HEART FAILURE: ICD-10-CM

## 2025-03-11 RX ORDER — HYDRALAZINE HYDROCHLORIDE 50 MG/1
50 TABLET, FILM COATED ORAL 3 TIMES DAILY
Qty: 270 TABLET | Refills: 3 | OUTPATIENT
Start: 2025-03-11

## 2025-03-11 NOTE — TELEPHONE ENCOUNTER
Can you confirm if she is still taking hydralazine before I refill? It's been discontinued previously by Dr. Frances Bee

## 2025-03-12 ENCOUNTER — DOCUMENTATION ONLY (OUTPATIENT)
Dept: CARDIOLOGY | Facility: CLINIC | Age: 60
End: 2025-03-12
Payer: MEDICARE

## 2025-03-12 DIAGNOSIS — Z01.810 PRE-OPERATIVE CARDIOVASCULAR EXAMINATION: Primary | ICD-10-CM

## 2025-03-12 RX ORDER — HYDRALAZINE HYDROCHLORIDE 10 MG/1
10 TABLET, FILM COATED ORAL EVERY 12 HOURS
Qty: 180 TABLET | Refills: 3 | Status: SHIPPED | OUTPATIENT
Start: 2025-03-12 | End: 2026-03-12

## 2025-03-12 NOTE — ASSESSMENT & PLAN NOTE
Cardiac Risk Estimation: per the Revised Cardiac Risk Index (Circ. 100:1043, 1999), the patient's risk factors for cardiac complications include history of congestive heart failure, history of ischemic heart disease, on insulin, and serum Cr > 2.0 mg/dL, putting her in: RCI RISK CLASS IV (> 2 risk factors, risk of major cardiac compl. appr. 9.1%)  -patient is wheelchair bound  -NM stress test 2/4/2025  Impression:     1. Findings worrisome for pharmacologic stress new gist ischemia involving the inferior, inferoseptal, and inferolateral walls of the left ventricle near the base of the heart.  A smaller perfusion defect is noted on the resting study and this raises the possibility of inferior wall infarct with rico-infarct ischemia..  2. The global left ventricular systolic function is diminished with an LV ejection fraction of 47 % and no evidence of LV dilatation.  There appears to be mild global hypokinesis of the left ventricular wall.  -Evaluated by Dr. Blankenship on 2/20/2025:  In the absence of symptoms and with history of infarction in the inferior wall I recommend against invasive angiogram/PCI at this point.   -Okay to Hold Apixaban ( Eliquis ) for 2 days prior to procedure and restart after procedure  -Okay to Hold Plavix (clopidogrel) for 5 days prior to procedure and restart post procedure

## 2025-03-12 NOTE — PROGRESS NOTES
Patient was seen physically in clinic on 2/5/2025    Pre- op evaluation for teeth extraction   Cardiac Risk Estimation: per the Revised Cardiac Risk Index (Circ. 100:1043, 1999), the patient's risk factors for cardiac complications include history of congestive heart failure, history of ischemic heart disease, on insulin, and serum Cr > 2.0 mg/dL, putting her in: RCI RISK CLASS IV (> 2 risk factors, risk of major cardiac compl. appr. 9.1%)  -patient is wheelchair bound  -NM stress test 2/4/2025  Impression:     1. Findings worrisome for pharmacologic stress new gist ischemia involving the inferior, inferoseptal, and inferolateral walls of the left ventricle near the base of the heart.  A smaller perfusion defect is noted on the resting study and this raises the possibility of inferior wall infarct with rico-infarct ischemia..  2. The global left ventricular systolic function is diminished with an LV ejection fraction of 47 % and no evidence of LV dilatation.  There appears to be mild global hypokinesis of the left ventricular wall.  -Evaluated by Dr. Blankenship on 2/20/2025:  In the absence of symptoms and with history of infarction in the inferior wall I recommend against invasive angiogram/PCI at this point.   -Okay to Hold Apixaban ( Eliquis ) for 2 days prior to procedure and restart after procedure  -Okay to Hold Plavix (clopidogrel) for 5 days prior to procedure and restart post procedure       Buzz Manjarrez DNP  [facility]

## 2025-03-13 RX ORDER — BLOOD-GLUCOSE SENSOR
1 EACH MISCELLANEOUS DAILY
Qty: 5 EACH | Refills: 0 | Status: SHIPPED | OUTPATIENT
Start: 2025-03-13 | End: 2026-03-13

## 2025-03-13 NOTE — TELEPHONE ENCOUNTER
----- Message from Karina sent at 3/13/2025  2:41 PM CDT -----  Type:  Patient Returning CallWho Called:Randell Would the patient rather a call back or a response via [facility]? Call back [facility] Call Back Number:[phone] Information: blood-glucose sensor (DEXCOM G7 SENSOR) Radha... need to speak with the office ... Need new Dexcom y

## 2025-03-17 RX ORDER — DEXTROSE 4 G
1 TABLET,CHEWABLE ORAL 3 TIMES DAILY
Qty: 1 EACH | Refills: 0 | Status: SHIPPED | OUTPATIENT
Start: 2025-03-17 | End: 2026-03-17

## 2025-03-19 ENCOUNTER — TELEPHONE (OUTPATIENT)
Dept: CARDIOLOGY | Facility: CLINIC | Age: 60
End: 2025-03-19
Payer: MEDICARE

## 2025-03-19 ENCOUNTER — OFFICE VISIT (OUTPATIENT)
Dept: FAMILY MEDICINE | Facility: CLINIC | Age: 60
End: 2025-03-19
Payer: MEDICARE

## 2025-03-19 VITALS — HEART RATE: 86 BPM | HEIGHT: 55 IN | BODY MASS INDEX: 32.02 KG/M2 | OXYGEN SATURATION: 98 %

## 2025-03-19 DIAGNOSIS — Z79.4 TYPE 2 DIABETES MELLITUS WITH HYPERGLYCEMIA, WITH LONG-TERM CURRENT USE OF INSULIN: ICD-10-CM

## 2025-03-19 DIAGNOSIS — M05.79 RHEUMATOID ARTHRITIS WITH RHEUMATOID FACTOR OF MULTIPLE SITES WITHOUT ORGAN OR SYSTEMS INVOLVEMENT: ICD-10-CM

## 2025-03-19 DIAGNOSIS — G89.21 CHRONIC PAIN AFTER AMPUTATION: ICD-10-CM

## 2025-03-19 DIAGNOSIS — E11.65 TYPE 2 DIABETES MELLITUS WITH HYPERGLYCEMIA, WITH LONG-TERM CURRENT USE OF INSULIN: ICD-10-CM

## 2025-03-19 DIAGNOSIS — F01.C0 SEVERE VASCULAR DEMENTIA, UNSPECIFIED WHETHER BEHAVIORAL, PSYCHOTIC, OR MOOD DISTURBANCE OR ANXIETY: ICD-10-CM

## 2025-03-19 DIAGNOSIS — F32.1 CURRENT MODERATE EPISODE OF MAJOR DEPRESSIVE DISORDER WITHOUT PRIOR EPISODE: ICD-10-CM

## 2025-03-19 DIAGNOSIS — Z12.31 ENCOUNTER FOR SCREENING MAMMOGRAM FOR MALIGNANT NEOPLASM OF BREAST: ICD-10-CM

## 2025-03-19 DIAGNOSIS — Z89.9 CHRONIC PAIN AFTER AMPUTATION: ICD-10-CM

## 2025-03-19 DIAGNOSIS — L97.923 NON-PRESSURE CHRONIC ULCER OF UNSPECIFIED PART OF LEFT LOWER LEG WITH NECROSIS OF MUSCLE: ICD-10-CM

## 2025-03-19 DIAGNOSIS — Z00.00 ANNUAL PHYSICAL EXAM: ICD-10-CM

## 2025-03-19 DIAGNOSIS — D32.9 MENINGIOMA: ICD-10-CM

## 2025-03-19 DIAGNOSIS — Z13.31 POSITIVE DEPRESSION SCREENING: ICD-10-CM

## 2025-03-19 DIAGNOSIS — M65.30 TRIGGER FINGER OF RIGHT HAND, UNSPECIFIED FINGER: ICD-10-CM

## 2025-03-19 DIAGNOSIS — F32.A DEPRESSION, UNSPECIFIED DEPRESSION TYPE: Primary | ICD-10-CM

## 2025-03-19 PROCEDURE — G2211 COMPLEX E/M VISIT ADD ON: HCPCS | Mod: S$GLB,,, | Performed by: STUDENT IN AN ORGANIZED HEALTH CARE EDUCATION/TRAINING PROGRAM

## 2025-03-19 PROCEDURE — 1160F RVW MEDS BY RX/DR IN RCRD: CPT | Mod: CPTII,S$GLB,, | Performed by: STUDENT IN AN ORGANIZED HEALTH CARE EDUCATION/TRAINING PROGRAM

## 2025-03-19 PROCEDURE — 1159F MED LIST DOCD IN RCRD: CPT | Mod: CPTII,S$GLB,, | Performed by: STUDENT IN AN ORGANIZED HEALTH CARE EDUCATION/TRAINING PROGRAM

## 2025-03-19 PROCEDURE — 3008F BODY MASS INDEX DOCD: CPT | Mod: CPTII,S$GLB,, | Performed by: STUDENT IN AN ORGANIZED HEALTH CARE EDUCATION/TRAINING PROGRAM

## 2025-03-19 PROCEDURE — 99999 PR PBB SHADOW E&M-EST. PATIENT-LVL IV: CPT | Mod: PBBFAC,,, | Performed by: STUDENT IN AN ORGANIZED HEALTH CARE EDUCATION/TRAINING PROGRAM

## 2025-03-19 PROCEDURE — 4010F ACE/ARB THERAPY RXD/TAKEN: CPT | Mod: CPTII,S$GLB,, | Performed by: STUDENT IN AN ORGANIZED HEALTH CARE EDUCATION/TRAINING PROGRAM

## 2025-03-19 PROCEDURE — 99214 OFFICE O/P EST MOD 30 MIN: CPT | Mod: S$GLB,,, | Performed by: STUDENT IN AN ORGANIZED HEALTH CARE EDUCATION/TRAINING PROGRAM

## 2025-03-19 PROCEDURE — 3066F NEPHROPATHY DOC TX: CPT | Mod: CPTII,S$GLB,, | Performed by: STUDENT IN AN ORGANIZED HEALTH CARE EDUCATION/TRAINING PROGRAM

## 2025-03-19 PROCEDURE — 3044F HG A1C LEVEL LT 7.0%: CPT | Mod: CPTII,S$GLB,, | Performed by: STUDENT IN AN ORGANIZED HEALTH CARE EDUCATION/TRAINING PROGRAM

## 2025-03-19 RX ORDER — INSULIN ASPART 100 [IU]/ML
5 INJECTION, SOLUTION INTRAVENOUS; SUBCUTANEOUS
Qty: 15 ML | Refills: 6 | Status: SHIPPED | OUTPATIENT
Start: 2025-03-19 | End: 2025-03-21 | Stop reason: SDUPTHER

## 2025-03-19 RX ORDER — SEMAGLUTIDE 0.68 MG/ML
0.5 INJECTION, SOLUTION SUBCUTANEOUS
Qty: 3 ML | Refills: 0 | Status: SHIPPED | OUTPATIENT
Start: 2025-03-19 | End: 2025-04-18

## 2025-03-19 RX ORDER — LANCETS 33 GAUGE
EACH MISCELLANEOUS
Qty: 100 EACH | Refills: 3 | Status: SHIPPED | OUTPATIENT
Start: 2025-03-19

## 2025-03-19 RX ORDER — PREGABALIN 75 MG/1
75 CAPSULE ORAL DAILY
Qty: 7 CAPSULE | Refills: 0 | Status: SHIPPED | OUTPATIENT
Start: 2025-03-19

## 2025-03-19 RX ORDER — BLOOD-GLUCOSE SENSOR
1 EACH MISCELLANEOUS DAILY
Qty: 5 EACH | Refills: 0 | Status: SHIPPED | OUTPATIENT
Start: 2025-03-19 | End: 2025-03-21 | Stop reason: SDUPTHER

## 2025-03-19 NOTE — PROGRESS NOTES
Patient ID: Suyapa Connelly is a 59 y.o. female.    Chief Complaint: bending fingers  and Follow-up (3 month f/u /)    History of Present Illness    CHIEF COMPLAINT:  Suyapa presents today for follow-up of multiple chronic conditions including diabetes and renal disease requiring dialysis    DIABETES:  She reports using insulin 5 units per meal on a sliding scale and 8 units at bedtime. She uses Dexcom 7 for continuous glucose monitoring. She is currently taking Ozempic.    RENAL DISEASE:  She is currently on dialysis. She reports increased urination following accidental double dosing of diuretic medication. She experiences dry mouth with ice craving.    CARDIOVASCULAR:  She is currently on Clopidogrel and Eliquis for anticoagulation. She has an upcoming cardiology appointment next week.    NEUROLOGICAL:  She reports memory and cognitive issues affecting daily activities, specifically difficulty with retention. She attributes some cognitive difficulties to depression. She was previously on gabapentin which was discontinued during hospitalization due to renal dysfunction. She required two 1-hour dialysis sessions to clear gabapentin before transitioning to pregabalin 75 mg daily.    MENTAL HEALTH:  She currently does not have a psychiatrist.    VISION:  She reports seeing double without glasses and difficulty reading. She currently uses OTC reading glasses with +2.00 strength, which provide some benefit but may not be as effective as prescription lenses.    PREVENTIVE CARE:  She has not had recent GYN care or mammogram.    DIET:  Her current diet consists mainly of fish and chicken, with limited beef consumption.      ROS:  General: -fever, -chills, -fatigue, -weight gain, -weight loss, +dry mouth  Eyes: -vision changes, -redness, -discharge, +double vision  ENT: -ear pain, -nasal congestion, -sore throat  Cardiovascular: -chest pain, -palpitations, -lower extremity edema  Respiratory: -cough, -shortness of  breath  Gastrointestinal: -abdominal pain, -nausea, -vomiting, -diarrhea, -constipation, -blood in stool  Genitourinary: -dysuria, -hematuria, -frequency, +excessive urination  Musculoskeletal: -joint pain, -muscle pain  Skin: -rash, -lesion, +itching  Neurological: -headache, -dizziness, -numbness, -tingling, +memory loss  Psychiatric: -anxiety, +depression, -sleep difficulty         Physical Exam    General: No acute distress. Well-developed. Well-nourished.  Eyes: EOMI. Sclerae anicteric.  HENT: Normocephalic. Atraumatic. Nares patent. Moist oral mucosa.  Ears: Bilateral TMs clear. Bilateral EACs clear.  Cardiovascular: Regular rate. Regular rhythm. No murmurs. No rubs. No gallops. Normal S1, S2.  Respiratory: Normal respiratory effort. Clear to auscultation bilaterally. No rales. No rhonchi. No wheezing.  Abdomen: Soft. Non-tender. Non-distended. Normoactive bowel sounds.  Musculoskeletal: No  obvious deformity.  Extremities: No lower extremity edema.  Neurological: Alert & oriented x3. No slurred speech. Normal gait.  Psychiatric: Normal mood. Normal affect. Good insight. Good judgment.  Skin: Warm. Dry. No rash.         Assessment & Plan    RHEUMATOID ARTHRITIS WITH RHEUMATOID FACTOR OF MULTIPLE SITES WITHOUT ORGAN OR SYSTEMS INVOLVEMENT:  - Evaluated patient's condition, noting difficulty in opening hand due to possible trigger finger.  - Referred to hand surgeon for evaluation and potential treatment.    SEVERE VASCULAR DEMENTIA, UNSPECIFIED WHETHER BEHAVIORAL, PSYCHOTIC, OR MOOD DISTURBANCE OR ANXIETY:  - Assessed patient's reported memory issues and difficulty retaining information.  - Recommend keeping the brain active with puzzles, crosswords, and reading.  - Advised proper hydration and nutrition to support brain function.    CURRENT MODERATE EPISODE OF MAJOR DEPRESSIVE DISORDER WITHOUT PRIOR EPISODE:  - Evaluated patient's reported feelings of being overwhelmed and depressed.  - Explained the  relationship between depression and memory issues, noting that depression can manifest as forgetfulness.  - Referred patient to psychiatrist (Alejandra) for evaluation and potential medication management.  - Suggested ways to keep the brain active to help manage symptoms of both depression and memory issues.    TYPE 2 DIABETES MELLITUS:  - Evaluated current medication regimen, noting improvement in blood flow from 35 to close to 48 (normal being about 50).  - Continued insulin regimen: 5 units Novolog per meal on a sliding scale, 8 units at bedtime.  - Refilled Novolog (insulin aspart) for sliding scale use and Ozempic.  - Educated patient on delayed nature of Dexcom readings compared to finger stick tests.  - Provided test strips for glucose monitoring as backup to Dexcom.  - Sent prescriptions for Novolog, Ozempic, and Dexcom 7 to the pharmacy.    END STAGE RENAL DISEASE AND DEPENDENCE ON RENAL DIALYSIS:  - Acknowledged impact of dialysis on patient's overall health, including potential cognitive symptoms.  - Noted ongoing dialysis treatment with IV iron administration.  - Confirmed monitoring by nephrologist, Dr. Guerra.  - Explained trigger finger condition and its association with dialysis.    MILD COGNITIVE IMPAIRMENT:  - Considered psychiatric evaluation and explained relationship between depression and memory issues.  - Discussed potential impact of anemia on cognitive symptoms.  - Advised continuing use of problem-solving puzzles on phone.    DIPLOPIA:  - Noted patient's wife reported seeing double without glasses.  - Referred patient to an optometrist for vision evaluation and potential new prescription.    PICA IN ADULTS:  - Noted patient's report of craving ice due to dry mouth.  - Acknowledged the issue as a balance problem, possibly related to hydration.    GENERAL RECOMMENDATIONS:  - Emphasized importance of hydration and nutrition for brain health.  - Recommend maintaining a diet rich in fish,  chicken, and leafy green vegetables.       1. Depression, unspecified depression type  -     Ambulatory referral/consult to Psychiatry; Future; Expected date: 03/26/2025    2. 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  Dispense: 7 capsule; Refill: 0    3. Trigger finger of right hand, unspecified finger  -     Ambulatory referral/consult to Hand Surgery; Future; Expected date: 03/26/2025    4. Annual physical exam  -     Ambulatory referral/consult to Obstetrics / Gynecology; Future; Expected date: 03/26/2025    5. Encounter for screening mammogram for malignant neoplasm of breast  -     Mammo Digital Screening Bilat w/ Vignesh (XPD); Future; Expected date: 03/19/2025    6. Type 2 diabetes mellitus with hyperglycemia, with long-term current use of insulin  -     Ambulatory referral/consult to Optometry; Future; Expected date: 03/26/2025    7. Positive depression screening  Comments:  I have reviewed the positive depression score which warrants active treatment with psychotherapy and/or medications.    8. Meningioma    9. Rheumatoid arthritis with rheumatoid factor of multiple sites without organ or systems involvement    10. Severe vascular dementia, unspecified whether behavioral, psychotic, or mood disturbance or anxiety    11. Current moderate episode of major depressive disorder without prior episode    12. 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  Dispense: 3 mL; Refill: 0  -     insulin aspart U-100 (NOVOLOG) 100 unit/mL (3 mL) InPn pen; Inject 5 Units into the skin 3 times daily with meals. **MODERATE CORRECTION DOSE**  151-200 mg/dL Pre-meal:  2 units 2200: 1 unit; 201-250 mg/dL Pre-meal 4 units 2200: 2 units; 251-300mg/dL Pre-meal 6 units 2200: 3 units; 301-350mg/dL Pre-meal 8 units 2200: 4 units; >350mg/dL Pre-meal 10 U 2200: 5 U  Dispense: 15 mL; Refill: 6  -     lancets 33 gauge Misc; TEST 3 TIMES DAILY  Dispense: 100 each; Refill: 3  -     blood sugar diagnostic Strp; 1 each by Misc.(Non-Drug; Combo Route) route 4 (four) times daily before meals and nightly.              No follow-ups on file.    This note was generated with the assistance of ambient listening technology. Verbal consent was obtained by the patient and accompanying visitor(s) for the recording of patient appointment to facilitate this note. I attest to having reviewed and edited the generated note for accuracy, though some syntax or spelling errors may persist. Please contact the author of this note for any clarification.

## 2025-03-19 NOTE — TELEPHONE ENCOUNTER
Received a dental clearance from [facility].  Form completed by Dr Blankenship and faxed to [phone].  Scanned in media.

## 2025-03-20 ENCOUNTER — TELEPHONE (OUTPATIENT)
Dept: PSYCHIATRY | Facility: CLINIC | Age: 60
End: 2025-03-20
Payer: MEDICARE

## 2025-03-21 RX ORDER — BLOOD-GLUCOSE SENSOR
1 EACH MISCELLANEOUS DAILY
Qty: 5 EACH | Refills: 0 | Status: SHIPPED | OUTPATIENT
Start: 2025-03-21 | End: 2026-03-21

## 2025-03-21 RX ORDER — INSULIN ASPART 100 [IU]/ML
5 INJECTION, SOLUTION INTRAVENOUS; SUBCUTANEOUS
Qty: 15 ML | Refills: 6 | Status: SHIPPED | OUTPATIENT
Start: 2025-03-21 | End: 2026-03-21

## 2025-03-21 NOTE — TELEPHONE ENCOUNTER
----- Message from Katie sent at 3/21/2025  9:07 AM CDT -----  Type:  RX Refill RequestWho Called: Pt's  Refill or New Rx: Refill RX Name and Strength:blood-glucose sensor (DEXCOM G7 SENSOR) Deviinsulin aspart U-100 (NOVOLOG) 100 unit/mL (3 mL) In penPreferred Pharmacy with phone number:[id number] [place] [place] [facility] [address] [address] [facility] [id number] [address]Seagz: [phone] Fax: [phone] or Mail Order: Local Ordering Provider: Bert Would the patient rather a call back or a response via [facility]? Call back Best Call Back Number: [phone] Information: Please be advised, caller stated that he was able to receive his refills, but pt's wasn't in after speaking w/ pharmacy, caller states that pt is needing prescriptions as soon as possible and is needing to pick them up for today, caller would like a call back once they have been sent over to pharmacy

## 2025-03-26 ENCOUNTER — EXTERNAL HOME HEALTH (OUTPATIENT)
Dept: HOME HEALTH SERVICES | Facility: HOSPITAL | Age: 60
End: 2025-03-26
Payer: MEDICARE

## 2025-03-26 ENCOUNTER — PATIENT OUTREACH (OUTPATIENT)
Dept: ADMINISTRATIVE | Facility: HOSPITAL | Age: 60
End: 2025-03-26
Payer: MEDICARE

## 2025-03-27 NOTE — TELEPHONE ENCOUNTER
----- Message from ReeseGeomagicmiguel angel sent at 3/27/2025  4:21 PM CDT -----  Who called: Pt husbandWhat is the request in detail: Pt  said the pharmacy is asking for another script to be sent over with the correct instructions on it for Dexcom G7 kit (monitor and ). Can the clinic reply by [facility]? No Would the patient rather a call back or a response via [facility]? Call back Best call back number: Telephone Information:Mobile          [phone] Information: [id number] - [place] [address] AT [facility] [id number] [address]Qzmxq: [phone] Fax: [phone] you.

## 2025-03-28 RX ORDER — BLOOD-GLUCOSE SENSOR
1 EACH MISCELLANEOUS DAILY
Qty: 5 EACH | Refills: 0 | Status: SHIPPED | OUTPATIENT
Start: 2025-03-28 | End: 2026-03-28

## 2025-04-04 ENCOUNTER — PATIENT OUTREACH (OUTPATIENT)
Dept: ADMINISTRATIVE | Facility: HOSPITAL | Age: 60
End: 2025-04-04
Payer: MEDICARE

## 2025-04-04 NOTE — PROGRESS NOTES
Health Maintenance Topic(s) Outreach Outcomes & Actions Taken:    Eye Exam - Outreach Outcomes & Actions Taken  : Eye Camera Scheduled or Optometry/Ophthalmology Referral Placed/Appt Scheduled

## 2025-04-08 DIAGNOSIS — Z89.9 CHRONIC PAIN AFTER AMPUTATION: ICD-10-CM

## 2025-04-08 DIAGNOSIS — G89.21 CHRONIC PAIN AFTER AMPUTATION: ICD-10-CM

## 2025-04-08 RX ORDER — PEN NEEDLE, DIABETIC 33 GX5/32"
1 NEEDLE, DISPOSABLE MISCELLANEOUS
Start: 2025-04-08

## 2025-04-08 RX ORDER — PREGABALIN 75 MG/1
75 CAPSULE ORAL DAILY
Qty: 7 CAPSULE | Refills: 0 | Status: SHIPPED | OUTPATIENT
Start: 2025-04-08

## 2025-04-09 ENCOUNTER — PATIENT OUTREACH (OUTPATIENT)
Dept: ADMINISTRATIVE | Facility: HOSPITAL | Age: 60
End: 2025-04-09
Payer: MEDICARE

## 2025-04-09 NOTE — PROGRESS NOTES
04/09/2025  VB chart audit performed. Care Everywhere updates requested and reviewed  Overdue HM topic chart audit and/or requested. LINKS triggered and reconciled. Media reviewed  Unable to Lvm/ No portal

## 2025-04-14 RX ORDER — TRAZODONE HYDROCHLORIDE 50 MG/1
50 TABLET ORAL NIGHTLY PRN
Start: 2025-04-14 | End: 2025-07-13

## 2025-04-14 RX ORDER — PEN NEEDLE, DIABETIC 33 GX5/32"
1 NEEDLE, DISPOSABLE MISCELLANEOUS
Start: 2025-04-14

## 2025-04-14 NOTE — TELEPHONE ENCOUNTER
----- Message from Courtney sent at 4/14/2025  2:17 PM CDT -----  Type:  RX Refill RequestWho Called: ptRefill or New Rx:refillRX Name and Strength:traZODone (DESYREL) 50 MG tabletPreferred Pharmacy with phone number:[id number] - [facility] [address] [place] [address] AT [facility] [address] [address] or Mail Order:localOrdering Provider:Nicol the patient rather a call back or a response via [facility]? callBest Call Back Number: [phone] Information:

## 2025-04-16 ENCOUNTER — PATIENT OUTREACH (OUTPATIENT)
Dept: ADMINISTRATIVE | Facility: HOSPITAL | Age: 60
End: 2025-04-16
Payer: MEDICARE

## 2025-04-21 DIAGNOSIS — Z89.9 CHRONIC PAIN AFTER AMPUTATION: ICD-10-CM

## 2025-04-21 DIAGNOSIS — G89.21 CHRONIC PAIN AFTER AMPUTATION: ICD-10-CM

## 2025-04-21 RX ORDER — PREGABALIN 75 MG/1
75 CAPSULE ORAL DAILY
Qty: 7 CAPSULE | Refills: 0 | Status: SHIPPED | OUTPATIENT
Start: 2025-04-21

## 2025-04-21 NOTE — TELEPHONE ENCOUNTER
"----- Message from Fabiola sent at 4/21/2025 12:42 PM CDT -----  Type:  RX Refill RequestWho Called:  Refill or New Rx:rx RX Name and Strength: pregabalin (LYRICA) 75 MG capsuletraZODone (DESYREL) 50 MG tabletpen needle, diabetic 33 gauge x 5/32" NdleOzempic (not on list)Preferred Pharmacy with phone number:[facility] #[id number] - [facility] [address] [facility] [address] [place] [facility] [address] or Mail Order:local Ordering Provider:ricardo Would the patient rather a call back or a response via [facility]? Call Best Call Back Number:  [phone] Additional Information: Caller stated their is only 7 pills being released at a time for pregabalin (LYRICA) 75 MG capsule prefers a month  and he would like to know why  "

## 2025-04-22 RX ORDER — TRAZODONE HYDROCHLORIDE 50 MG/1
TABLET ORAL
Qty: 30 TABLET | Refills: 3 | Status: SHIPPED | OUTPATIENT
Start: 2025-04-22

## 2025-04-24 ENCOUNTER — TELEPHONE (OUTPATIENT)
Dept: OBSTETRICS AND GYNECOLOGY | Facility: CLINIC | Age: 60
End: 2025-04-24
Payer: MEDICARE

## 2025-04-24 NOTE — TELEPHONE ENCOUNTER
Attempted to contact pt regarding her appt scheduled for tomorrow. I left a voicemail for pt to return phone call. I informed pt her appt will be cancelled via voicemail.

## 2025-04-25 ENCOUNTER — PATIENT OUTREACH (OUTPATIENT)
Dept: ADMINISTRATIVE | Facility: HOSPITAL | Age: 60
End: 2025-04-25
Payer: MEDICARE

## 2025-04-25 DIAGNOSIS — Z95.1 S/P CABG X 1: ICD-10-CM

## 2025-04-25 RX ORDER — CLOPIDOGREL BISULFATE 75 MG/1
75 TABLET ORAL DAILY
Qty: 90 TABLET | Refills: 3 | OUTPATIENT
Start: 2025-04-25 | End: 2026-04-25

## 2025-04-25 NOTE — TELEPHONE ENCOUNTER
LOV-2/5/2025 Buzz Manjarrez      Spoke to Mr. Mejias and he [place] [facility] [place]                4/25/25  4:36 PM  Please inform Ms. Connelly she is to continue taking the clopidogrel ( Plavix) the prescription was sent to [facility] on 2/5/2025. The medication was to be on hold ONLY 5 days before for her dental procedure and restarted after procedure as instructed.     Thank you,  Buzz Manjarrez, DNP       , Mr Mejias wants to have a conversation with you about clopidogrel.

## 2025-04-28 RX ORDER — PEN NEEDLE, DIABETIC 30 GX3/16"
NEEDLE, DISPOSABLE MISCELLANEOUS
Qty: 100 EACH | Refills: 0 | Status: SHIPPED | OUTPATIENT
Start: 2025-04-28

## 2025-05-01 ENCOUNTER — OFFICE VISIT (OUTPATIENT)
Dept: SURGERY | Facility: CLINIC | Age: 60
End: 2025-05-01
Payer: MEDICARE

## 2025-05-01 VITALS
DIASTOLIC BLOOD PRESSURE: 83 MMHG | HEART RATE: 93 BPM | TEMPERATURE: 99 F | WEIGHT: 130.06 LBS | HEIGHT: 65 IN | BODY MASS INDEX: 21.67 KG/M2 | SYSTOLIC BLOOD PRESSURE: 159 MMHG

## 2025-05-01 DIAGNOSIS — K42.9 UMBILICAL HERNIA WITHOUT OBSTRUCTION AND WITHOUT GANGRENE: Primary | ICD-10-CM

## 2025-05-01 PROCEDURE — 99999 PR PBB SHADOW E&M-EST. PATIENT-LVL V: CPT | Mod: PBBFAC,HCNC,, | Performed by: SURGERY

## 2025-05-01 NOTE — PROGRESS NOTES
[facility]  OUTPATIENT H&P      HPI: Suyapa Connelly is a 59 y.o. female interested in switching to PD HD.  She has small umbilical and epigastric hernias w/o symptoms.  AVF is functioning well.    I have reviewed the patient's chart including prior progress notes, procedures and testing.     ROS:   Review of Systems   All other systems reviewed and are negative.      PROBLEM LIST:  Problem List[1]      HISTORY  Past Medical History:   Diagnosis Date    Anxiety     Chronic pain syndrome     CKD (chronic kidney disease), stage III     CVD (cardiovascular disease)     Depression     Diabetes mellitus, type 2     GERD (gastroesophageal reflux disease)     Hyperemesis 03/23/2021    Hypokalemia 03/23/2021    Infection of below knee amputation stump 03/12/2022    Osteomyelitis     Osteomyelitis of left foot 04/30/2021    Ulcer of left foot     Vaginal delivery     x1       Past Surgical History:   Procedure Laterality Date    ABOVE-KNEE AMPUTATION Left 5/18/2021    Procedure: AMPUTATION, ABOVE KNEE;  Surgeon: Teddy Huber MD;  Location: [facility] OR [address] [place] [address];  Service: Vascular;  Laterality: Left;    ABOVE-KNEE AMPUTATION Right 3/18/2022    Procedure: AMPUTATION, ABOVE KNEE;  Surgeon: DAYNE Florez II, MD;  Location: [facility] OR [address] [place] [address];  Service: Vascular;  Laterality: Right;    Angiogram - Right Extremity Right 7/9/15    ANGIOGRAM, EXTREMITY, UNILATERAL Left 11/22/2024    Procedure: ANGIOGRAM, EXTREMITY, UNILATERAL;  Surgeon: Yony Lindsey MD;  Location: [facility] OR [address] [place] [address];  Service: Vascular;  Laterality: Left;  Left leg angio, L CFA approach  5.4 min  597.31 mGy  73.8959 Gycm2  20 ml dye    angiogram-left leg  10/6/15    ANGIOGRAPHY OF LOWER EXTREMITY Left 4/29/2021    Procedure: ANGIOGRAM, LOWER EXTREMITY;  Surgeon: Teddy Huber MD;  Location: [facility] OR [address] [place] [address];  Service: Vascular;  Laterality: Left;    BELOW KNEE AMPUTATION OF LOWER EXTREMITY Right 12/28/2021    Procedure:  AMPUTATION, BELOW KNEE;  Surgeon: Kaitlyn Rojas MD;  Location: [facility] OR;  Service: General;  Laterality: Right;    CATHETERIZATION OF BOTH LEFT AND RIGHT HEART N/A 12/18/2019    Procedure: CATHETERIZATION, HEART, BOTH LEFT AND RIGHT;  Surgeon: Que Fernando III, MD;  Location: [facility] CATH LAB;  Service: Cardiology;  Laterality: N/A;    CORONARY ANGIOGRAPHY N/A 12/18/2019    Procedure: ANGIOGRAM, CORONARY ARTERY;  Surgeon: Que Fernando III, MD;  Location: [facility] CATH LAB;  Service: Cardiology;  Laterality: N/A;    CORONARY ANGIOGRAPHY INCLUDING BYPASS GRAFTS WITH CATHETERIZATION OF LEFT HEART N/A 7/28/2020    Procedure: ANGIOGRAM, CORONARY, INCLUDING BYPASS GRAFT, WITH LEFT HEART CATHETERIZATION, 9 am;  Surgeon: Rachel Easley MD;  Location: [facility] CATH LAB;  Service: Cardiology;  Laterality: N/A;    CORONARY ARTERY BYPASS GRAFT (CABG) N/A 1/14/2020    Procedure: CORONARY ARTERY BYPASS GRAFT (CABG) x 1     Off Pump;  Surgeon: Huang Altamirano MD;  Location: [address];  Service: Cardiovascular;  Laterality: N/A;    CREATION OF FEMORAL-TIBIAL ARTERY BYPASS Left 4/29/2021    Procedure: CREATION, BYPASS, ARTERIAL, FEMORAL TO ANTERIOR TIBIAL;  Surgeon: Teddy Huber MD;  Location: [address];  Service: Vascular;  Laterality: Left;    CREATION OF FEMOROPOPLITEAL ARTERIAL BYPASS USING GRAFT Left 8/18/2020    Procedure: CREATION, BYPASS, ARTERIAL, FEMORAL TO POPLITEAL, USING GRAFT, LEFT LOWER EXTREMITY;  Surgeon: Teddy Huber MD;  Location: [facility];  Service: Vascular;  Laterality: Left;  REQUEST 7:15 A.M. START----COVID NEGATIVE ON 8/17  1ST CASE STARTKENYA PER LEANA ON 8/7/2020 @ 942AM-LO  RN PREOP 8/12/2020   T/S-----CLEARED BY CARDS-------PENDING INSURANCE    DEBRIDEMENT OF FOOT Left 9/8/2020    Procedure: DEBRIDEMENT, FOOT;  Surgeon: Rosio Mayes DPM;  Location: [facility];  Service: Podiatry;  Laterality: Left;  request neoxx .   RN Pre Op 9-4-2020, Covid negative on 9/5/20. C A     DEBRIDEMENT OF FOOT  3/4/2021    Procedure: DEBRIDEMENT, FOOT;  Surgeon: Teddy Huber MD;  Location: [facility] OR;  Service: Vascular;;    DEBRIDEMENT OF FOOT Left 3/9/2021    Procedure: DEBRIDEMENT, FOOT, bone biopsy;  Surgeon: Rosio Mayes DPM;  Location: [facility] OR;  Service: Podiatry;  Laterality: Left;  Request neoxx---COVID IN AM  REQUESTING NOON START  RN Phone Pre op.On Blood thinners Plavix and Eliquis.  Covid am of surgery. C A    DEBRIDEMENT OF FOOT Left 5/4/2021    Procedure: DEBRIDEMENT, FOOT;  Surgeon: Farooq Morley DPM;  Location: [facility] 2ND FLR;  Service: Podiatry;  Laterality: Left;    ESOPHAGOGASTRODUODENOSCOPY N/A 11/7/2024    Procedure: EGD (ESOPHAGOGASTRODUODENOSCOPY);  Surgeon: Yony Cancino MD;  Location: [facility];  Service: Endoscopy;  Laterality: N/A;    INSERTION OF TUNNELED CENTRAL VENOUS HEMODIALYSIS CATHETER N/A 1/27/2020    Procedure: Insertion, Catheter, Central Venous, Hemodialysis;  Surgeon: ESTEBAN Gomez III, MD;  Location: [facility] CATH LAB;  Service: Peripheral Vascular;  Laterality: N/A;    INSERTION OF TUNNELED CENTRAL VENOUS HEMODIALYSIS CATHETER  5/10/2023    Procedure: Insertion, Catheter, Central Venous, Hemodialysis;  Surgeon: Romulo Queen MD;  Location: [facility] CATH LAB;  Service: Cardiology;;    IRRIGATION AND DEBRIDEMENT OF LOWER EXTREMITY Left 11/22/2024    Procedure: IRRIGATION AND DEBRIDEMENT, LOWER EXTREMITY;  Surgeon: Yony Lindsey MD;  Location: [address] FLR;  Service: Vascular;  Laterality: Left;    MAGNETIC RESONANCE IMAGING Left 11/19/2024    Procedure: MRI (Magnetic Resonance Imagine);  Surgeon: Sophia Hernandez;  Location: [facility] SOPHIA;  Service: Anesthesiology;  Laterality: Left;    PERCUTANEOUS TRANSLUMINAL ANGIOPLASTY N/A 3/4/2021    Procedure: PTA (ANGIOPLASTY, PERCUTANEOUS, TRANSLUMINAL);  Surgeon: Teddy Huber MD;  Location: [facility] OR;  Service: Vascular;  Laterality: N/A;    REMOVAL OF ARTERIOVENOUS GRAFT Left  5/27/2021    Procedure: REMOVAL, GRAFT, LEFT LOWER EXTREMITY, WOUND EXPLORATION;  Surgeon: Teddy Huber MD;  Location: [facility] OR 2ND FLR;  Service: Vascular;  Laterality: Left;    REMOVAL OF NAIL OF DIGIT Left 3/9/2021    Procedure: REMOVAL, NAIL, DIGIT;  Surgeon: Rosio Mayes DPM;  Location: [facility] OR;  Service: Podiatry;  Laterality: Left;    RIGHT HEART CATHETERIZATION Right 5/10/2023    Procedure: INSERTION, CATHETER, RIGHT HEART;  Surgeon: Romulo Queen MD;  Location: [facility] CATH LAB;  Service: Cardiology;  Laterality: Right;    STENT, ILIAC ARTERY Left 11/22/2024    Procedure: STENT, ILIAC ARTERY;  Surgeon: Yony Lindsey MD;  [facility];  Service: Vascular;  Laterality: Left;    THROMBECTOMY Left 3/4/2021    Procedure: THROMBECTOMY, LEFT LOWER EXTREMITY BYPASS GRAFT, ANGIOGRAM, POSSIBLE INTERVENTION, POSSIBLE LEFT LOWER EXTREMITY BYPASS;  Surgeon: Teddy Huber MD;  Location: [facility] OR;  Service: Vascular;  Laterality: Left;    THROMBECTOMY Left 4/29/2021    Procedure: GRAFT THROMBECTOMY, LEFT LOWER EXTREMITY;  Surgeon: Teddy Huber MD;  Location: [facility] OR [facility] FLR;  Service: Vascular;  Laterality: Left;  14.5 min  1179.85 mGy  341.01 Gycm2  240 ml dye    THROMBECTOMY  10/22/2021    Procedure: THROMBECTOMY;  Surgeon: Saad Arenas MD;  Location: [facility] CATH LAB/EP;  Service: Cardiology;;       Social History[2]    Family History   Problem Relation Name Age of Onset    Diabetes Mother      Diabetes Father      Heart disease Maternal Grandmother      No Known Problems Maternal Grandfather      Diabetes Paternal Grandmother      No Known Problems Paternal Grandfather      Anesthesia problems Neg Hx           MEDS:  Medications Ordered Prior to Encounter[3]    ALLERGIES:  Review of patient's allergies indicates:   Allergen Reactions    Ciprofloxacin Itching    Pcn [penicillins]      Rash; tolerated ceftriaxone on 1/13/20  Tolerating Augmentin November 2024          VITALS:  Vitals:    05/01/25 0929   BP: (!) 159/83   Pulse: 93   Temp: 98.6 °F (37 °C)         PHYSICAL EXAM:  Physical Exam  Constitutional:       Appearance: Normal appearance.   Pulmonary:      Effort: Pulmonary effort is normal.   Abdominal:      Palpations: Abdomen is soft.      Hernia: A hernia is present.   Skin:     General: Skin is warm.   Neurological:      Mental Status: She is oriented to person, place, and time.           LABS:  Lab Results   Component Value Date    WBC 3.98 02/12/2025    RBC 3.43 (L) 02/12/2025    HGB 10.5 (L) 02/12/2025    HCT 34.1 (L) 02/12/2025    HCT 17 (LL) 11/15/2024     02/12/2025     Lab Results   Component Value Date     (H) 02/12/2025     (L) 02/12/2025    K 4.5 04/30/2025    K 3.9 02/12/2025    CL 99 02/12/2025    CO2 23 02/12/2025    BUN 47 (H) 02/12/2025    CREATININE 3.6 (H) 02/12/2025    CALCIUM 8.8 02/12/2025     Lab Results   Component Value Date    ALT 24 02/12/2025    AST 37 02/12/2025     (H) 10/29/2024    ALKPHOS 306 (H) 02/12/2025    BILITOT 0.5 02/12/2025     Lab Results   Component Value Date    MG 2.0 02/12/2025    PHOS 5.9 (H) 04/30/2025    PHOS 5.3 (H) 02/12/2025       STUDIES:  CTA images and reports were personally reviewed.        ASSESSMENT & PLAN:  59 y.o. female, will need cardiac clearance and f/u with Dr Tai for PD insertion +/- hernia repair.      Yariel Rizvi M.D., F.A.C.S.  [id number]  [facility]             [1]   Patient Active Problem List  Diagnosis    Peripheral vascular disease    Hypocalcemia    Vitamin D deficiency    CAROLIN (generalized anxiety disorder)    Acute cystitis without hematuria    Type 2 diabetes mellitus with hyperglycemia, with long-term current use of insulin    CAD (coronary artery disease)    S/P CABG x 1    Anemia due to chronic kidney disease, on chronic dialysis    Paroxysmal atrial fibrillation    Hyponatremia    S/P femoral-popliteal bypass  surgery    Thrombosis of femoro-popliteal bypass graft    Impaired functional mobility and endurance    Nephrotic syndrome    Encephalopathy    Uremia    Vascular graft infection    Hypoalbuminemia    Chronic combined systolic and diastolic congestive heart failure    Postmenopausal bleeding    Uterine fibroid    Muscle wasting and atrophy, not elsewhere classified, right thigh     Adjustment disorder with mixed anxiety and depressed mood    Phantom limb syndrome with pain    Peripheral neuropathic pain    Impaired eye movement    Septic shock    Myoclonus    Dermatitis associated with moisture    Uses self-applied continuous glucose monitoring device    S/P AKA (above knee amputation) bilateral    Hyperkalemia    Debility    ESRD (end stage renal disease) on dialysis    Myalgia    Chronic kidney disease-mineral and bone disorder    Uremic encephalopathy    Acute metabolic encephalopathy    Morbid obesity with BMI of 50.0-59.9, adult    Hypoglycemia    Depression    Unresponsive    Thrombocytopenia    Severe obesity (BMI >= 40)    Irritant contact dermatitis due to friction or contact with body fluids, unspecified    Pressure injury of sacral region, unstageable    Confusion    Discharge planning issues    Postconcussive syndrome    Screening for cervical cancer    Need for vaccination    Mastodynia    Need for Tdap vaccination    Type 2 diabetes mellitus with chronic kidney disease on chronic dialysis, with long-term current use of insulin    Atherosclerosis of native coronary artery of native heart with angina pectoris    Breast asymmetry    Psychophysiological insomnia    Social problem    Open thigh wound, left, initial encounter    Cellulitis of left lower extremity    Left thigh pain    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, on chronic dialysis    Postoperative anemia    Transaminitis    Pre-operative cardiovascular examination    Meningioma    Rheumatoid  arthritis with rheumatoid factor of multiple sites without organ or systems involvement    Severe vascular dementia, unspecified whether behavioral, psychotic, or mood disturbance or anxiety    Current moderate episode of major depressive disorder without prior episode    Non-pressure chronic ulcer of unspecified part of left lower leg with necrosis of muscle   [2]   Social History  Tobacco Use    Smoking status: Former     Passive exposure: Never    Smokeless tobacco: Never   Substance Use Topics    Alcohol use: No    Drug use: Yes     Types: Marijuana     Comment: occassional   [3]   Current Outpatient Medications on File Prior to Visit   Medication Sig Dispense Refill    acetaminophen (TYLENOL) 325 MG tablet Take 2 tablets (650 mg total) by mouth every 8 (eight) hours as needed for Pain.      allopurinoL (ZYLOPRIM) 100 MG tablet TAKE 1/2 TABLET(50 MG) BY MOUTH EVERY OTHER DAY 8 tablet 11    apixaban (ELIQUIS) 2.5 mg Tab Take 1 tablet (2.5 mg total) by mouth 2 (two) times daily. 180 tablet 0    atorvastatin (LIPITOR) 80 MG tablet Take 1 tablet (80 mg total) by mouth once daily. 90 tablet 3    blood sugar diagnostic Strp 1 each by Misc.(Non-Drug; Combo Route) route 4 (four) times daily before meals and nightly.      blood-glucose meter Misc Before meals and at bedtime      blood-glucose meter Misc 1 each by Misc.(Non-Drug; Combo Route) route 3 (three) times daily. 1 each 0    blood-glucose sensor (DEXCOM G7 SENSOR) Radha Apply device and replace every 10 days 5 each 0    calcitRIOL (ROCALTROL) 0.5 MCG Cap Take 1 capsule (0.5 mcg total) by mouth once daily. 30 capsule 11    carvediloL (COREG) 6.25 MG tablet Take 6.25 mg by mouth 2 (two) times daily.      cinacalcet (SENSIPAR) 30 MG Tab Take 1 tablet (30 mg total) by mouth every Mon, Wed, Fri.      clopidogreL (PLAVIX) 75 mg tablet Take 1 tablet (75 mg total) by mouth once daily. 90 tablet 2    furosemide (LASIX) 40 MG tablet Take 1 tablet (40 mg total) by mouth 2  "(two) times daily. 180 tablet 3    hydrALAZINE (APRESOLINE) 10 MG tablet Take 1 tablet (10 mg total) by mouth every 12 (twelve) hours. 180 tablet 3    insulin aspart U-100 (NOVOLOG FLEXPEN U-100 INSULIN) 100 unit/mL (3 mL) InPn pen Inject 3 Units into the skin 3 (three) times daily with meals. 3 mL 10    insulin aspart U-100 (NOVOLOG) 100 unit/mL (3 mL) InPn pen Inject 5 Units into the skin 3 times daily with meals. **MODERATE CORRECTION DOSE**  151-200 mg/dL Pre-meal: 2 units 2200: 1 unit; 201-250 mg/dL Pre-meal 4 units 2200: 2 units; 251-300mg/dL Pre-meal 6 units 2200: 3 units; 301-350mg/dL Pre-meal 8 units 2200: 4 units; >350mg/dL Pre-meal 10 U 2200: 5 U 15 mL 6    insulin glargine U-100, Lantus, 100 unit/mL (3 mL) SubQ InPn pen Inject 5 Units into the skin every evening.      isosorbide dinitrate (ISORDIL) 10 MG tablet Take 0.5 tablets (5 mg total) by mouth 2 (two) times daily. 90 tablet 3    lancets 33 gauge Misc TEST 3 TIMES DAILY 100 each 3    multivit with min-folic acid (ONE DAILY WOMENS 50 PLUS) 0.4 mg Tab Take 1 tablet by mouth once daily.      pen needle, diabetic (BD MIGUEL 2ND GEN PEN NEEDLE) 32 gauge x 5/32" Ndle Use to inject insulin once daily 100 each 0    pen needle, diabetic 33 gauge x 5/32" Ndle 1 each by Misc.(Non-Drug; Combo Route) route 4 (four) times daily before meals and nightly.      pregabalin (LYRICA) 75 MG capsule Take 1 capsule (75 mg total) by mouth Daily. 7 capsule 0    RENVELA 2.4 gram PwPk Take 1 packet by mouth 3 (three) times daily with meals.      sacubitriL-valsartan (ENTRESTO) 24-26 mg per tablet Take 1 tablet by mouth 2 (two) times daily. 60 tablet 6    sertraline (ZOLOFT) 100 MG tablet Take 1 tablet (100 mg total) by mouth once daily. 30 tablet 11    sodium bicarbonate 650 MG tablet TAKE 1 TABLET(650 MG) BY MOUTH THREE TIMES DAILY 90 tablet 11    traZODone (DESYREL) 50 MG tablet TAKE 1/2 TABLET(25 MG) BY MOUTH EVERY EVENING 30 tablet 3    famotidine (PEPCID) 20 MG tablet TAKE " 1 TABLET(20 MG) BY MOUTH TWICE DAILY (Patient not taking: Reported on 5/1/2025) 180 tablet 0    mupirocin (BACTROBAN) 2 % ointment Apply topically 2 (two) times daily. (Patient not taking: Reported on 2/13/2025) 30 g 3    zinc oxide 10 % Oint Apply 1 Application topically 2 (two) times a day. (Patient not taking: Reported on 2/13/2025) 226.8 g 0    [DISCONTINUED] lancing device Misc 1 Device by Misc.(Non-Drug; Combo Route) route 2 (two) times daily with meals. 1 each 0     No current facility-administered medications on file prior to visit.

## 2025-05-07 ENCOUNTER — TELEPHONE (OUTPATIENT)
Dept: ORTHOPEDICS | Facility: CLINIC | Age: 60
End: 2025-05-07
Payer: MEDICARE

## 2025-05-07 DIAGNOSIS — M79.642 BILATERAL HAND PAIN: Primary | ICD-10-CM

## 2025-05-07 DIAGNOSIS — M79.641 BILATERAL HAND PAIN: Primary | ICD-10-CM

## 2025-05-08 DIAGNOSIS — Z89.9 CHRONIC PAIN AFTER AMPUTATION: ICD-10-CM

## 2025-05-08 DIAGNOSIS — G89.21 CHRONIC PAIN AFTER AMPUTATION: ICD-10-CM

## 2025-05-08 RX ORDER — PREGABALIN 75 MG/1
75 CAPSULE ORAL DAILY
Qty: 7 CAPSULE | Refills: 0 | Status: SHIPPED | OUTPATIENT
Start: 2025-05-08

## 2025-05-08 RX ORDER — CALCIUM ACETATE 667 MG/1
667 TABLET ORAL 2 TIMES DAILY
Qty: 60 TABLET | Refills: 11 | Status: SHIPPED | OUTPATIENT
Start: 2025-05-08 | End: 2026-05-08

## 2025-05-08 NOTE — TELEPHONE ENCOUNTER
Spoke to pt  and he requested refill for ambien, pregablin, and calcium alcetate. Informed him that I would let Dr Noel know.

## 2025-05-08 NOTE — TELEPHONE ENCOUNTER
----- Message from Osmarjanesin sent at 5/8/2025 11:26 AM CDT -----  Type : Patient Call  Who Called : Patient Brother Kashif   Does the patient know what this is regarding?: Pt brother is requesting a call back in regards to the pt medications. Please Advise    Would the patient rather a call back or a response via [facility]? Call   Best Call Back Number: [phone]   Additional Information:

## 2025-05-09 ENCOUNTER — DOCUMENT SCAN (OUTPATIENT)
Dept: HOME HEALTH SERVICES | Facility: HOSPITAL | Age: 60
End: 2025-05-09
Payer: MEDICARE

## 2025-05-16 ENCOUNTER — OFFICE VISIT (OUTPATIENT)
Dept: SURGERY | Facility: CLINIC | Age: 60
End: 2025-05-16
Payer: MEDICARE

## 2025-05-16 VITALS
TEMPERATURE: 99 F | HEIGHT: 65 IN | SYSTOLIC BLOOD PRESSURE: 117 MMHG | BODY MASS INDEX: 20.57 KG/M2 | HEART RATE: 82 BPM | DIASTOLIC BLOOD PRESSURE: 62 MMHG | WEIGHT: 123.44 LBS

## 2025-05-16 DIAGNOSIS — E83.9 CHRONIC KIDNEY DISEASE-MINERAL BONE DISORDER (CKD-MBD) WITH STAGE 5 CHRONIC KIDNEY DISEASE, ON CHRONIC DIALYSIS: Primary | ICD-10-CM

## 2025-05-16 DIAGNOSIS — M89.9 CHRONIC KIDNEY DISEASE-MINERAL BONE DISORDER (CKD-MBD) WITH STAGE 5 CHRONIC KIDNEY DISEASE, ON CHRONIC DIALYSIS: Primary | ICD-10-CM

## 2025-05-16 DIAGNOSIS — N18.5 CHRONIC KIDNEY DISEASE-MINERAL BONE DISORDER (CKD-MBD) WITH STAGE 5 CHRONIC KIDNEY DISEASE, ON CHRONIC DIALYSIS: Primary | ICD-10-CM

## 2025-05-16 DIAGNOSIS — Z99.2 CHRONIC KIDNEY DISEASE-MINERAL BONE DISORDER (CKD-MBD) WITH STAGE 5 CHRONIC KIDNEY DISEASE, ON CHRONIC DIALYSIS: Primary | ICD-10-CM

## 2025-05-16 PROCEDURE — 99999 PR PBB SHADOW E&M-EST. PATIENT-LVL IV: CPT | Mod: PBBFAC,HCNC,, | Performed by: STUDENT IN AN ORGANIZED HEALTH CARE EDUCATION/TRAINING PROGRAM

## 2025-05-16 NOTE — PROGRESS NOTES
Patient ID: Suyapa Connelly is a 59 y.o. female.    Chief Complaint: No chief complaint on file.      HPI:  HPI  59F with ESRD on HD via [facility] permacath for 2-3 years. No issues with that.   Hx of vascular problems, CABG in 2020, arterial bypass lower extremity. Eventually required AKA bilaterally 5 and 6  years ago. Had wound complications to left stump that have now resolved.   Interested in PD. Never had AVF.    Currently get HD at [facility] in [facility]. Does not know who nephrologist it. No recent notes in chart.   No hx of ab surgery  Does have epigastric hernia related to cabg and small umbilical hernia, both asymptomatic.         Review of Systems   Constitutional:  Negative for fever.   HENT:  Negative for trouble swallowing.    Respiratory:  Negative for shortness of breath.    Cardiovascular:  Negative for chest pain.   Gastrointestinal:  Negative for abdominal pain, blood in stool, nausea and vomiting.   Genitourinary:  Negative for dysuria.   Musculoskeletal:  Positive for gait problem.   Skin:  Negative for rash and wound.   Allergic/Immunologic: Negative for immunocompromised state.   Neurological:  Negative for weakness.   Hematological:  Does not bruise/bleed easily.   Psychiatric/Behavioral:  Negative for agitation.        Current Medications[1]    Review of patient's allergies indicates:   Allergen Reactions    Ciprofloxacin Itching    Pcn [penicillins]      Rash; tolerated ceftriaxone on 1/13/20  Tolerating Augmentin November 2024       Past Medical History:   Diagnosis Date    Anxiety     Chronic pain syndrome     CKD (chronic kidney disease), stage III     CVD (cardiovascular disease)     Depression     Diabetes mellitus, type 2     GERD (gastroesophageal reflux disease)     Hyperemesis 03/23/2021    Hypokalemia 03/23/2021    Infection of below knee amputation stump 03/12/2022    Osteomyelitis     Osteomyelitis of left foot 04/30/2021    Ulcer of left foot     Vaginal delivery     x1       Past  Surgical History:   Procedure Laterality Date    ABOVE-KNEE AMPUTATION Left 5/18/2021    Procedure: AMPUTATION, ABOVE KNEE;  Surgeon: Teddy Huber MD;  Location: [facility] OR [address] [place] [address];  Service: Vascular;  Laterality: Left;    ABOVE-KNEE AMPUTATION Right 3/18/2022    Procedure: AMPUTATION, ABOVE KNEE;  Surgeon: DAYNE Florez II, MD;  Location: [facility] OR [address] [place] [address];  Service: Vascular;  Laterality: Right;    Angiogram - Right Extremity Right 7/9/15    ANGIOGRAM, EXTREMITY, UNILATERAL Left 11/22/2024    Procedure: ANGIOGRAM, EXTREMITY, UNILATERAL;  Surgeon: Yony Lindsey MD;  [facility];  Service: Vascular;  Laterality: Left;  Left leg angio, L CFA approach  5.4 min  597.31 mGy  73.8959 Gycm2  20 ml dye    angiogram-left leg  10/6/15    ANGIOGRAPHY OF LOWER EXTREMITY Left 4/29/2021    Procedure: ANGIOGRAM, LOWER EXTREMITY;  Surgeon: Teddy Huber MD;  Location: [facility] OR [address] [place] [address];  Service: Vascular;  Laterality: Left;    BELOW KNEE AMPUTATION OF LOWER EXTREMITY Right 12/28/2021    Procedure: AMPUTATION, BELOW KNEE;  Surgeon: Kaitlyn Rojas MD;  Location: [facility] OR;  Service: General;  Laterality: Right;    CATHETERIZATION OF BOTH LEFT AND RIGHT HEART N/A 12/18/2019    Procedure: CATHETERIZATION, HEART, BOTH LEFT AND RIGHT;  Surgeon: Que Fernando III, MD;  [facility];  Service: Cardiology;  Laterality: N/A;    CORONARY ANGIOGRAPHY N/A 12/18/2019    Procedure: ANGIOGRAM, CORONARY ARTERY;  Surgeon: Que Fernando III, MD;  [facility];  Service: Cardiology;  Laterality: N/A;    CORONARY ANGIOGRAPHY INCLUDING BYPASS GRAFTS WITH CATHETERIZATION OF LEFT HEART N/A 7/28/2020    Procedure: ANGIOGRAM, CORONARY, INCLUDING BYPASS GRAFT, WITH LEFT HEART CATHETERIZATION, 9 am;  Surgeon: Rachel Easley MD;  Location: [facility] CATH LAB;  Service: Cardiology;  Laterality: N/A;    CORONARY ARTERY BYPASS GRAFT (CABG) N/A 1/14/2020    Procedure: CORONARY ARTERY BYPASS  GRAFT (CABG) x 1     Off Pump;  Surgeon: Huang Altamirano MD;  Location: [address];  Service: Cardiovascular;  Laterality: N/A;    CREATION OF FEMORAL-TIBIAL ARTERY BYPASS Left 4/29/2021    Procedure: CREATION, BYPASS, ARTERIAL, FEMORAL TO ANTERIOR TIBIAL;  Surgeon: Teddy Huber MD;  Location: [address];  Service: Vascular;  Laterality: Left;    CREATION OF FEMOROPOPLITEAL ARTERIAL BYPASS USING GRAFT Left 8/18/2020    Procedure: CREATION, BYPASS, ARTERIAL, FEMORAL TO POPLITEAL, USING GRAFT, LEFT LOWER EXTREMITY;  Surgeon: Teddy Huber MD;  Location: [facility];  Service: Vascular;  Laterality: Left;  REQUEST 7:15 A.M. START----COVID NEGATIVE ON 8/17 1ST CASE STARTE PER LEANA ON 8/7/2020 @ 942AM-  RN PREOP 8/12/2020   T/S-----CLEARED BY CARDS-------PENDING INSURANCE    DEBRIDEMENT OF FOOT Left 9/8/2020    Procedure: DEBRIDEMENT, FOOT;  Surgeon: Rosio Mayes DPM;  Location: [facility];  Service: Podiatry;  Laterality: Left;  request neoxx .   RN Pre Op 9-4-2020, Covid negative on 9/5/20. C A    DEBRIDEMENT OF FOOT  3/4/2021    Procedure: DEBRIDEMENT, FOOT;  Surgeon: Teddy Huber MD;  Location: [facility];  Service: Vascular;;    DEBRIDEMENT OF FOOT Left 3/9/2021    Procedure: DEBRIDEMENT, FOOT, bone biopsy;  Surgeon: Rosio Mayes DPM;  Location: [facility] OR;  Service: Podiatry;  Laterality: Left;  Request neoxx---COVID IN AM  REQUESTING NOON START  RN Phone Pre op.On Blood thinners Plavix and Eliquis.  Covid am of surgery. C A    DEBRIDEMENT OF FOOT Left 5/4/2021    Procedure: DEBRIDEMENT, FOOT;  Surgeon: Farooq Morley DPM;  Location: [address];  Service: Podiatry;  Laterality: Left;    ESOPHAGOGASTRODUODENOSCOPY N/A 11/7/2024    Procedure: EGD (ESOPHAGOGASTRODUODENOSCOPY);  Surgeon: Yony Cancino MD;  Location: [facility];  Service: Endoscopy;  Laterality: N/A;    INSERTION OF TUNNELED CENTRAL VENOUS HEMODIALYSIS CATHETER N/A 1/27/2020    Procedure:  Insertion, Catheter, Central Venous, Hemodialysis;  Surgeon: ESTEBAN Gomez III, MD;  Location: [facility] CATH LAB;  Service: Peripheral Vascular;  Laterality: N/A;    INSERTION OF TUNNELED CENTRAL VENOUS HEMODIALYSIS CATHETER  5/10/2023    Procedure: Insertion, Catheter, Central Venous, Hemodialysis;  Surgeon: Romulo Queen MD;  Location: [facility] CATH LAB;  Service: Cardiology;;    IRRIGATION AND DEBRIDEMENT OF LOWER EXTREMITY Left 11/22/2024    Procedure: IRRIGATION AND DEBRIDEMENT, LOWER EXTREMITY;  Surgeon: Yony Lindsey MD;  Location: [facility] OR 2ND FLR;  Service: Vascular;  Laterality: Left;    MAGNETIC RESONANCE IMAGING Left 11/19/2024    Procedure: MRI (Magnetic Resonance Imagine);  Surgeon: Sophia Hernandez;  Location: [facility] SOPHIA;  Service: Anesthesiology;  Laterality: Left;    PERCUTANEOUS TRANSLUMINAL ANGIOPLASTY N/A 3/4/2021    Procedure: PTA (ANGIOPLASTY, PERCUTANEOUS, TRANSLUMINAL);  Surgeon: Teddy Huber MD;  Location: [facility] OR;  Service: Vascular;  Laterality: N/A;    REMOVAL OF ARTERIOVENOUS GRAFT Left 5/27/2021    Procedure: REMOVAL, GRAFT, LEFT LOWER EXTREMITY, WOUND EXPLORATION;  Surgeon: Teddy Huber MD;  Location: [facility] OR 2ND FLR;  Service: Vascular;  Laterality: Left;    REMOVAL OF NAIL OF DIGIT Left 3/9/2021    Procedure: REMOVAL, NAIL, DIGIT;  Surgeon: Rosio Mayes DPM;  Location: [facility] OR;  Service: Podiatry;  Laterality: Left;    RIGHT HEART CATHETERIZATION Right 5/10/2023    Procedure: INSERTION, CATHETER, RIGHT HEART;  Surgeon: Romulo Queen MD;  Location: [facility] CATH LAB;  Service: Cardiology;  Laterality: Right;    STENT, ILIAC ARTERY Left 11/22/2024    Procedure: STENT, ILIAC ARTERY;  Surgeon: Yony Lindsey MD;  Location: [facility] OR 2ND FLR;  Service: Vascular;  Laterality: Left;    THROMBECTOMY Left 3/4/2021    Procedure: THROMBECTOMY, LEFT LOWER EXTREMITY BYPASS GRAFT, ANGIOGRAM, POSSIBLE INTERVENTION, POSSIBLE LEFT LOWER EXTREMITY BYPASS;   Surgeon: Teddy Huber MD;  Location: [facility] OR;  Service: Vascular;  Laterality: Left;    THROMBECTOMY Left 4/29/2021    Procedure: GRAFT THROMBECTOMY, LEFT LOWER EXTREMITY;  Surgeon: Teddy Huber MD;  Location: [facility] OR 2ND FLR;  Service: Vascular;  Laterality: Left;  14.5 min  1179.85 mGy  341.01 Gycm2  240 ml dye    THROMBECTOMY  10/22/2021    Procedure: THROMBECTOMY;  Surgeon: Saad Arenas MD;  Location: [facility] CATH LAB/EP;  Service: Cardiology;;       Social History[2]    Vitals:    05/16/25 0907   BP: 117/62   Pulse: 82   Temp: 98.6 °F (37 °C)       Physical Exam  Constitutional:       General: She is not in acute distress.     Appearance: She is well-developed.   HENT:      Head: Normocephalic and atraumatic.   Eyes:      General: No scleral icterus.  Cardiovascular:      Rate and Rhythm: Normal rate.   Pulmonary:      Effort: Pulmonary effort is normal.      Breath sounds: No stridor.   Abdominal:      General: There is no distension.      Palpations: Abdomen is soft.      Tenderness: There is no abdominal tenderness.       Lymphadenopathy:      Cervical: No cervical adenopathy.   Skin:     General: Skin is warm.      Findings: No erythema.   Neurological:      Mental Status: She is alert and oriented to person, place, and time.   Psychiatric:         Behavior: Behavior normal.     Body mass index is 20.54 kg/m².  Bilateral AKA stumps well healed    GFR 14  Cardiac echo EF 30-40%  Hga1c 5.5  CT reviewed, about 2cm epigastric hernia near xiphoid and 1.5cm UH    Assessment & Plan:  59F with ESRD on HD via [facility] permacat  Interested in PD  Not great candidate for elective surgery, may forego hernia repairs and just place PD?  ON plavix and eliquis. Would need to stop for 5 and 2 days.   Will attempt to figure out who her nephrologist is  Planning PD catheter placement. Discussed risks with surgery             [1]   Current Outpatient Medications   Medication Sig Dispense Refill    acetaminophen  (TYLENOL) 325 MG tablet Take 2 tablets (650 mg total) by mouth every 8 (eight) hours as needed for Pain.      allopurinoL (ZYLOPRIM) 100 MG tablet TAKE 1/2 TABLET(50 MG) BY MOUTH EVERY OTHER DAY 8 tablet 11    apixaban (ELIQUIS) 2.5 mg Tab Take 1 tablet (2.5 mg total) by mouth 2 (two) times daily. 180 tablet 0    atorvastatin (LIPITOR) 80 MG tablet Take 1 tablet (80 mg total) by mouth once daily. 90 tablet 3    blood sugar diagnostic Strp 1 each by Misc.(Non-Drug; Combo Route) route 4 (four) times daily before meals and nightly.      blood-glucose meter Misc Before meals and at bedtime      blood-glucose meter Misc 1 each by Misc.(Non-Drug; Combo Route) route 3 (three) times daily. 1 each 0    blood-glucose sensor (DEXCOM G7 SENSOR) Radha Apply device and replace every 10 days 5 each 0    calcitRIOL (ROCALTROL) 0.5 MCG Cap Take 1 capsule (0.5 mcg total) by mouth once daily. 30 capsule 11    calcium acetate,phosphat bind, (PHOSLO) 667 mg tablet Take 1 tablet (667 mg total) by mouth 2 (two) times a day. 60 tablet 11    carvediloL (COREG) 6.25 MG tablet Take 6.25 mg by mouth 2 (two) times daily.      cinacalcet (SENSIPAR) 30 MG Tab Take 1 tablet (30 mg total) by mouth every Mon, Wed, Fri.      clopidogreL (PLAVIX) 75 mg tablet Take 1 tablet (75 mg total) by mouth once daily. 90 tablet 2    famotidine (PEPCID) 20 MG tablet TAKE 1 TABLET(20 MG) BY MOUTH TWICE DAILY 180 tablet 0    furosemide (LASIX) 40 MG tablet Take 1 tablet (40 mg total) by mouth 2 (two) times daily. 180 tablet 3    hydrALAZINE (APRESOLINE) 10 MG tablet Take 1 tablet (10 mg total) by mouth every 12 (twelve) hours. 180 tablet 3    insulin aspart U-100 (NOVOLOG FLEXPEN U-100 INSULIN) 100 unit/mL (3 mL) InPn pen Inject 3 Units into the skin 3 (three) times daily with meals. 3 mL 10    insulin aspart U-100 (NOVOLOG) 100 unit/mL (3 mL) InPn pen Inject 5 Units into the skin 3 times daily with meals. **MODERATE CORRECTION DOSE**  151-200 mg/dL Pre-meal: 2  "units 2200: 1 unit; 201-250 mg/dL Pre-meal 4 units 2200: 2 units; 251-300mg/dL Pre-meal 6 units 2200: 3 units; 301-350mg/dL Pre-meal 8 units 2200: 4 units; >350mg/dL Pre-meal 10 U 2200: 5 U 15 mL 6    insulin glargine U-100, Lantus, 100 unit/mL (3 mL) SubQ InPn pen Inject 5 Units into the skin every evening.      isosorbide dinitrate (ISORDIL) 10 MG tablet Take 0.5 tablets (5 mg total) by mouth 2 (two) times daily. 90 tablet 3    lancets 33 gauge Misc TEST 3 TIMES DAILY 100 each 3    multivit with min-folic acid (ONE DAILY WOMENS 50 PLUS) 0.4 mg Tab Take 1 tablet by mouth once daily.      mupirocin (BACTROBAN) 2 % ointment Apply topically 2 (two) times daily. 30 g 3    pen needle, diabetic (BD MIGUEL 2ND GEN PEN NEEDLE) 32 gauge x 5/32" Ndle Use to inject insulin once daily 100 each 0    pen needle, diabetic 33 gauge x 5/32" Ndle 1 each by Misc.(Non-Drug; Combo Route) route 4 (four) times daily before meals and nightly.      pregabalin (LYRICA) 75 MG capsule Take 1 capsule (75 mg total) by mouth Daily. 7 capsule 0    RENVELA 2.4 gram PwPk Take 1 packet by mouth 3 (three) times daily with meals.      sacubitriL-valsartan (ENTRESTO) 24-26 mg per tablet Take 1 tablet by mouth 2 (two) times daily. 60 tablet 6    sertraline (ZOLOFT) 100 MG tablet Take 1 tablet (100 mg total) by mouth once daily. 30 tablet 11    sodium bicarbonate 650 MG tablet TAKE 1 TABLET(650 MG) BY MOUTH THREE TIMES DAILY 90 tablet 11    traZODone (DESYREL) 50 MG tablet TAKE 1/2 TABLET(25 MG) BY MOUTH EVERY EVENING 30 tablet 3    zinc oxide 10 % Oint Apply 1 Application topically 2 (two) times a day. 226.8 g 0     No current facility-administered medications for this visit.   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      Social Drivers of Health     Financial Resource Strain: Low Risk  (2/11/2025)    Overall Financial Resource Strain (CARDIA)     Difficulty of Paying Living Expenses: Not very hard   Food Insecurity: No Food Insecurity (2/11/2025)    Hunger Vital Sign     Worried About Running Out of Food in the Last Year: Never true     Ran Out of Food in the Last Year: Never true   Transportation Needs: No Transportation Needs (2/11/2025)    TRANSPORTATION NEEDS     Transportation : No   Physical Activity: Inactive (2/11/2025)    Exercise Vital Sign     Days of Exercise per Week: 0 days     Minutes of Exercise per Session: 0 min   Stress: No Stress Concern Present (2/11/2025)    [employer] [place] [employer] - Occupational Stress Questionnaire     Feeling of Stress : Only a little   Housing Stability: Unknown (2/11/2025)    Housing Stability Vital Sign     Unable to Pay for Housing in the Last Year: No     Homeless in the Last Year: No

## 2025-05-21 DIAGNOSIS — I50.42 CHRONIC COMBINED SYSTOLIC AND DIASTOLIC CONGESTIVE HEART FAILURE: ICD-10-CM

## 2025-05-21 DIAGNOSIS — T82.868D THROMBOSIS OF FEMORO-POPLITEAL BYPASS GRAFT, SUBSEQUENT ENCOUNTER: ICD-10-CM

## 2025-05-21 RX ORDER — SACUBITRIL AND VALSARTAN 24; 26 MG/1; MG/1
1 TABLET, FILM COATED ORAL 2 TIMES DAILY
Qty: 60 TABLET | Refills: 6 | OUTPATIENT
Start: 2025-05-21

## 2025-05-21 RX ORDER — INSULIN GLARGINE 100 [IU]/ML
5 INJECTION, SOLUTION SUBCUTANEOUS NIGHTLY
Qty: 4.5 ML | Refills: 0 | Status: SHIPPED | OUTPATIENT
Start: 2025-05-21

## 2025-05-21 RX ORDER — INSULIN ASPART 100 [IU]/ML
5 INJECTION, SOLUTION INTRAVENOUS; SUBCUTANEOUS
Qty: 13.5 ML | Refills: 0 | Status: SHIPPED | OUTPATIENT
Start: 2025-05-21

## 2025-05-21 NOTE — TELEPHONE ENCOUNTER
----- Message from Priceza sent at 5/21/2025  8:32 AM CDT -----  Type:  RX Refill RequestWho Called: ptRefill or New Rx:refillRX Name and Strength:insulin aspart U-100 (NOVOLOG) 100 unit/mL (3 mL) InPn pensacubitriL-valsartan (ENTRESTO) 24-26 mg per tabletinsulin glargine U-100, Lantus, 100 unit/mL (3 mL) SubQ InPn penapixaban (ELIQUIS) 2.5 mg TabOzempicHow is the patient currently taking it? (ex. 1XDay):2xDay; 2xDayIs this a 30 day or 90 day RX:90Preferred Pharmacy with phone number:[id number] - [facility] [address] AT [facility] [address] Phone: [phone]: [phone] or Mail Order:localOrdering Provider:Steffen the patient rather a call back or a response via [facility]? [facility] Call Back Number:[phone] Additional Information:

## 2025-05-28 DIAGNOSIS — N18.6 ESRD (END STAGE RENAL DISEASE): ICD-10-CM

## 2025-05-28 DIAGNOSIS — Z99.2 ESRD (END STAGE RENAL DISEASE) ON DIALYSIS: Primary | ICD-10-CM

## 2025-05-28 DIAGNOSIS — N18.6 ESRD (END STAGE RENAL DISEASE) ON DIALYSIS: Primary | ICD-10-CM

## 2025-05-29 NOTE — PROGRESS NOTES
5/29/2025    Physician No Ref-Primary  No address on file    RE: Tee Escudero       Dear Colleague,     I had the pleasure of seeing Tee Escudero in the [facility].  No notes on file    Thank you for allowing me to participate in the care of your patient.      Sincerely,     Yodit Louise MD     [facility]  cc:   Kristen M Kehr, PA-C  [address] [place] [address]       [facility]  Nephrology  Progress Note    Patient Name: Suyapa Connelly  MRN: [id number]  Admission Date: 5/28/2024  Hospital Length of Stay: 0 days  Attending Provider: Yury Daily MD   Primary Care Physician: Magen Christensen MD  Principal Problem:Acute encephalopathy    Subjective:     Interval History: NAEON. HD today.     Review of patient's allergies indicates:   Allergen Reactions    Ciprofloxacin Itching    Iodine      Kidney injury    Pcn [penicillins]      Rash; tolerated ceftriaxone on 1/13/20     Current Facility-Administered Medications   Medication Frequency    0.9%  NaCl infusion Once    albuterol-ipratropium 2.5 mg-0.5 mg/3 mL nebulizer solution 3 mL Q4H PRN    allopurinol split tablet 50 mg Every other day    aluminum-magnesium hydroxide-simethicone 200-200-20 mg/5 mL suspension 30 mL QID PRN    apixaban tablet 5 mg BID    atorvastatin tablet 80 mg Daily    bisacodyL suppository 10 mg Daily PRN    calcitRIOL capsule 0.5 mcg Daily    calcium acetate(phosphat bind) capsule 667 mg TID WM    carvediloL tablet 6.25 mg BID    cefTRIAXone (Rocephin) 1 g in dextrose 5 % in water (D5W) 100 mL IVPB (MB+) Q24H    clopidogreL tablet 75 mg Daily    dextrose 10% bolus 125 mL 125 mL PRN    dextrose 10% bolus 250 mL 250 mL PRN    famotidine tablet 20 mg Daily    glucagon (human recombinant) injection 1 mg PRN    glucose chewable tablet 16 g PRN    glucose chewable tablet 24 g PRN    heparin (porcine) injection 1,000 Units PRN    hydrALAZINE injection 5 mg Q6H PRN    hydrALAZINE tablet 50 mg TID    insulin aspart U-100 pen 0-5 Units QID (AC + HS) PRN    melatonin tablet 6 mg Nightly PRN    naloxone 0.4 mg/mL injection 0.02 mg PRN    ondansetron disintegrating tablet 8 mg Q8H PRN    polyethylene glycol packet 17 g BID PRN    prochlorperazine injection Soln 5 mg Q6H PRN    sertraline tablet 100 mg Daily    simethicone chewable tablet 80 mg QID PRN    sodium chloride 0.9% flush 5 mL PRN        Objective:     Vital Signs (Most Recent):  Temp: 98.5 °F (36.9 °C) (05/31/24 0814)  Pulse: 82 (05/31/24 1049)  Resp: 18 (05/31/24 0814)  BP: (!) 140/72 (05/31/24 0814)  SpO2: 95 % (05/31/24 0814) Vital Signs (24h Range):  Temp:  [97.7 °F (36.5 °C)-98.5 °F (36.9 °C)] 98.5 °F (36.9 °C)  Pulse:  [82-89] 82  Resp:  [17-20] 18  SpO2:  [94 %-97 %] 95 %  BP: (120-140)/(59-80) 140/72     Weight: 70.1 kg (154 lb 8.7 oz) (05/29/24 0350)  Body mass index is 47.16 kg/m².  Body surface area is 1.54 meters squared.    No intake/output data recorded.     Physical Exam  Vitals and nursing note reviewed.   Constitutional:       General: She is not in acute distress.     Appearance: She is ill-appearing.      Comments: Alert but minimally conversant, dysarthric    HENT:      Head: Normocephalic and atraumatic.      Nose: Nose normal.      Mouth/Throat:      Pharynx: No oropharyngeal exudate.   Eyes:      Extraocular Movements: Extraocular movements intact.   Cardiovascular:      Rate and Rhythm: Normal rate and regular rhythm.      Heart sounds: Normal heart sounds.   Pulmonary:      Effort: Pulmonary effort is normal. No respiratory distress.      Breath sounds: Normal breath sounds.   Abdominal:      General: Bowel sounds are normal. There is no distension.      Palpations: Abdomen is soft.      Tenderness: There is no abdominal tenderness.   Musculoskeletal:         General: Deformity present.      Cervical back: Normal range of motion and neck supple.      Comments: Bilateral AKA  No pitting edema   Skin:     General: Skin is warm and dry.   Neurological:      General: No focal deficit present.      Mental Status: She is alert.      Motor: Weakness present.      Comments: Oriented to self and can identify  by name. Disoriented to place, situation, time  Dysarthric and aphasic   Follows some commands, requires repeated prompting            Significant Labs:  CBC:   Recent Labs   Lab 05/31/24  0332   WBC 4.81   RBC  3.50*   HGB 11.1*   HCT 34.8*      MCV 99*   MCH 31.7*   MCHC 31.9*     CMP:   Recent Labs   Lab 05/30/24  0544 05/31/24  0901   * 197*   CALCIUM 8.8 9.2   ALBUMIN 2.8*  --    PROT 7.9  --    * 135*   K 4.0 4.9   CO2 22* 20*   CL 99 100   BUN 44* 56*   CREATININE 4.2* 5.6*   ALKPHOS 177*  --    ALT 13  --    AST 16  --    BILITOT 1.1*  --      All labs within the past 24 hours have been reviewed.       Assessment/Plan:     Neuro  * Acute encephalopathy  -management per primary     Renal/  ESRD (end stage renal disease)  ESRD on iHD MWF  Dr. Jesika MATA [facility]DC  + residual renal fx     Missed HD session on Monday prior to presentation.     Plan/Recommendations:     -Continue MWF iHD schedule while IP   -strict I/O's  -Renal diet when advanced  -Continue calcium acetate   -Hgb @ goal for ESRD, continue trending.  No need for NAEEM therapy.        Thank you for your consult. I will follow-up with patient. Please contact us if you have any additional questions.    Katie Monique, CHRIS  Nephrology  [facility]

## 2025-05-30 ENCOUNTER — TELEPHONE (OUTPATIENT)
Dept: SURGERY | Facility: CLINIC | Age: 60
End: 2025-05-30
Payer: MEDICARE

## 2025-05-30 NOTE — TELEPHONE ENCOUNTER
----- Message from Manuel sent at 5/30/2025  1:43 PM CDT -----  .Type:  Needs Medical AdviceWho Called: pt  Lucilaould the patient rather a call back or a response via [facility]? Call [facility] Call Back Number: [phone] Information: Randell stated he would like a call back to get information about pt procedure on 6/3/25                05/30/2025                                         16:26 pm  Spoke to patient about above message. Patient was requesting information about her hospital stay. Informed patient the nurse will call the day before surgery with that information. Patient verbalized understanding.

## 2025-05-30 NOTE — TELEPHONE ENCOUNTER
----- Message from Roshni sent at 5/30/2025 10:58 AM CDT -----  Type:  Procedure Who Called: pt's  Does the patient know what this is regarding?: confirm procedure details on 06/03Would the patient rather a call back or a response via [facility]? Call [facility] Call Back Number: [phone] Additional Information:            05/30/2025                                            16:23pm  Spoke to patient about above message. Patient was asking about hospital stay. Informed patient the nurse will call the day before surgery with that information. Patient verbalized understanding.

## 2025-06-02 ENCOUNTER — PATIENT OUTREACH (OUTPATIENT)
Dept: ADMINISTRATIVE | Facility: HOSPITAL | Age: 60
End: 2025-06-02
Payer: MEDICARE

## 2025-06-03 ENCOUNTER — HOSPITAL ENCOUNTER (OUTPATIENT)
Facility: HOSPITAL | Age: 60
Discharge: HOME | End: 2025-06-03
Attending: STUDENT IN AN ORGANIZED HEALTH CARE EDUCATION/TRAINING PROGRAM | Admitting: STUDENT IN AN ORGANIZED HEALTH CARE EDUCATION/TRAINING PROGRAM
Payer: MEDICARE

## 2025-06-03 ENCOUNTER — ANESTHESIA EVENT (OUTPATIENT)
Dept: SURGERY | Facility: HOSPITAL | Age: 60
End: 2025-06-03
Payer: MEDICARE

## 2025-06-03 ENCOUNTER — ANESTHESIA (OUTPATIENT)
Dept: SURGERY | Facility: HOSPITAL | Age: 60
End: 2025-06-03
Payer: MEDICARE

## 2025-06-03 VITALS
WEIGHT: 123 LBS | DIASTOLIC BLOOD PRESSURE: 60 MMHG | SYSTOLIC BLOOD PRESSURE: 116 MMHG | RESPIRATION RATE: 15 BRPM | OXYGEN SATURATION: 96 % | BODY MASS INDEX: 20.49 KG/M2 | TEMPERATURE: 98 F | HEIGHT: 65 IN | HEART RATE: 65 BPM

## 2025-06-03 DIAGNOSIS — N18.6 ESRD (END STAGE RENAL DISEASE) ON DIALYSIS: ICD-10-CM

## 2025-06-03 DIAGNOSIS — Z99.2 ESRD (END STAGE RENAL DISEASE) ON DIALYSIS: ICD-10-CM

## 2025-06-03 DIAGNOSIS — N18.6 ESRD (END STAGE RENAL DISEASE): Primary | ICD-10-CM

## 2025-06-03 LAB
ANION GAP (OHS): 11 MMOL/L (ref 8–16)
BUN SERPL-MCNC: 43 MG/DL (ref 6–20)
CALCIUM SERPL-MCNC: 9.1 MG/DL (ref 8.7–10.5)
CHLORIDE SERPL-SCNC: 105 MMOL/L (ref 95–110)
CO2 SERPL-SCNC: 18 MMOL/L (ref 23–29)
CREAT SERPL-MCNC: 4.3 MG/DL (ref 0.5–1.4)
GFR SERPLBLD CREATININE-BSD FMLA CKD-EPI: 11 ML/MIN/1.73/M2
GLUCOSE SERPL-MCNC: 66 MG/DL (ref 70–110)
POCT GLUCOSE: 69 MG/DL (ref 70–110)
POTASSIUM SERPL-SCNC: 4.5 MMOL/L (ref 3.5–5.1)
SODIUM SERPL-SCNC: 134 MMOL/L (ref 136–145)

## 2025-06-03 PROCEDURE — 37000008 HC ANESTHESIA 1ST 15 MINUTES: Mod: HCNC | Performed by: STUDENT IN AN ORGANIZED HEALTH CARE EDUCATION/TRAINING PROGRAM

## 2025-06-03 PROCEDURE — C1750 CATH, HEMODIALYSIS,LONG-TERM: HCPCS | Mod: HCNC | Performed by: STUDENT IN AN ORGANIZED HEALTH CARE EDUCATION/TRAINING PROGRAM

## 2025-06-03 PROCEDURE — 27201423 OPTIME MED/SURG SUP & DEVICES STERILE SUPPLY: Mod: HCNC | Performed by: STUDENT IN AN ORGANIZED HEALTH CARE EDUCATION/TRAINING PROGRAM

## 2025-06-03 PROCEDURE — 49324 LAP INSERT TUNNEL IP CATH: CPT | Mod: HCNC,,, | Performed by: STUDENT IN AN ORGANIZED HEALTH CARE EDUCATION/TRAINING PROGRAM

## 2025-06-03 PROCEDURE — 36000709 HC OR TIME LEV III EA ADD 15 MIN: Mod: HCNC | Performed by: STUDENT IN AN ORGANIZED HEALTH CARE EDUCATION/TRAINING PROGRAM

## 2025-06-03 PROCEDURE — 37000009 HC ANESTHESIA EA ADD 15 MINS: Mod: HCNC | Performed by: STUDENT IN AN ORGANIZED HEALTH CARE EDUCATION/TRAINING PROGRAM

## 2025-06-03 PROCEDURE — 25000003 PHARM REV CODE 250: Mod: HCNC | Performed by: NURSE ANESTHETIST, CERTIFIED REGISTERED

## 2025-06-03 PROCEDURE — 63600175 PHARM REV CODE 636 W HCPCS: Mod: HCNC | Performed by: ANESTHESIOLOGY

## 2025-06-03 PROCEDURE — 25000003 PHARM REV CODE 250: Mod: HCNC | Performed by: ANESTHESIOLOGY

## 2025-06-03 PROCEDURE — 63600175 PHARM REV CODE 636 W HCPCS: Mod: HCNC | Performed by: STUDENT IN AN ORGANIZED HEALTH CARE EDUCATION/TRAINING PROGRAM

## 2025-06-03 PROCEDURE — 82947 ASSAY GLUCOSE BLOOD QUANT: CPT | Mod: HCNC | Performed by: STUDENT IN AN ORGANIZED HEALTH CARE EDUCATION/TRAINING PROGRAM

## 2025-06-03 PROCEDURE — 71000016 HC POSTOP RECOV ADDL HR: Mod: HCNC | Performed by: STUDENT IN AN ORGANIZED HEALTH CARE EDUCATION/TRAINING PROGRAM

## 2025-06-03 PROCEDURE — 71000039 HC RECOVERY, EACH ADD'L HOUR: Mod: HCNC | Performed by: STUDENT IN AN ORGANIZED HEALTH CARE EDUCATION/TRAINING PROGRAM

## 2025-06-03 PROCEDURE — 71000015 HC POSTOP RECOV 1ST HR: Mod: HCNC | Performed by: STUDENT IN AN ORGANIZED HEALTH CARE EDUCATION/TRAINING PROGRAM

## 2025-06-03 PROCEDURE — 36415 COLL VENOUS BLD VENIPUNCTURE: CPT | Mod: HCNC | Performed by: STUDENT IN AN ORGANIZED HEALTH CARE EDUCATION/TRAINING PROGRAM

## 2025-06-03 PROCEDURE — 71000033 HC RECOVERY, INTIAL HOUR: Mod: HCNC | Performed by: STUDENT IN AN ORGANIZED HEALTH CARE EDUCATION/TRAINING PROGRAM

## 2025-06-03 PROCEDURE — 63600175 PHARM REV CODE 636 W HCPCS: Mod: HCNC | Performed by: NURSE ANESTHETIST, CERTIFIED REGISTERED

## 2025-06-03 PROCEDURE — 36000708 HC OR TIME LEV III 1ST 15 MIN: Mod: HCNC | Performed by: STUDENT IN AN ORGANIZED HEALTH CARE EDUCATION/TRAINING PROGRAM

## 2025-06-03 DEVICE — KIT PERITONEAL DIALYS 62.0CM: Type: IMPLANTABLE DEVICE | Site: ABDOMEN | Status: FUNCTIONAL

## 2025-06-03 RX ORDER — PHENYLEPHRINE HYDROCHLORIDE 10 MG/ML
INJECTION INTRAVENOUS
Status: DISCONTINUED | OUTPATIENT
Start: 2025-06-03 | End: 2025-06-03

## 2025-06-03 RX ORDER — AMOXICILLIN 250 MG
1 CAPSULE ORAL 2 TIMES DAILY
COMMUNITY
Start: 2025-06-03

## 2025-06-03 RX ORDER — FENTANYL CITRATE 50 UG/ML
INJECTION, SOLUTION INTRAMUSCULAR; INTRAVENOUS
Status: DISCONTINUED | OUTPATIENT
Start: 2025-06-03 | End: 2025-06-03

## 2025-06-03 RX ORDER — PROPOFOL 10 MG/ML
VIAL (ML) INTRAVENOUS
Status: DISCONTINUED | OUTPATIENT
Start: 2025-06-03 | End: 2025-06-03

## 2025-06-03 RX ORDER — BUPIVACAINE HYDROCHLORIDE 2.5 MG/ML
INJECTION, SOLUTION EPIDURAL; INFILTRATION; INTRACAUDAL; PERINEURAL
Status: DISCONTINUED | OUTPATIENT
Start: 2025-06-03 | End: 2025-06-03 | Stop reason: HOSPADM

## 2025-06-03 RX ORDER — SODIUM CHLORIDE 0.9 % (FLUSH) 0.9 %
10 SYRINGE (ML) INJECTION
Status: DISCONTINUED | OUTPATIENT
Start: 2025-06-03 | End: 2025-06-03 | Stop reason: HOSPADM

## 2025-06-03 RX ORDER — PROCHLORPERAZINE EDISYLATE 5 MG/ML
5 INJECTION INTRAMUSCULAR; INTRAVENOUS EVERY 30 MIN PRN
Status: DISCONTINUED | OUTPATIENT
Start: 2025-06-03 | End: 2025-06-03 | Stop reason: HOSPADM

## 2025-06-03 RX ORDER — DEXAMETHASONE SODIUM PHOSPHATE 4 MG/ML
INJECTION, SOLUTION INTRA-ARTICULAR; INTRALESIONAL; INTRAMUSCULAR; INTRAVENOUS; SOFT TISSUE
Status: DISCONTINUED | OUTPATIENT
Start: 2025-06-03 | End: 2025-06-03

## 2025-06-03 RX ORDER — HYDROCODONE BITARTRATE AND ACETAMINOPHEN 5; 325 MG/1; MG/1
1 TABLET ORAL EVERY 4 HOURS PRN
Qty: 10 TABLET | Refills: 0 | Status: SHIPPED | OUTPATIENT
Start: 2025-06-03

## 2025-06-03 RX ORDER — GLUCAGON 1 MG
1 KIT INJECTION
Status: DISCONTINUED | OUTPATIENT
Start: 2025-06-03 | End: 2025-06-03 | Stop reason: HOSPADM

## 2025-06-03 RX ORDER — LIDOCAINE HYDROCHLORIDE 20 MG/ML
INJECTION INTRAVENOUS
Status: DISCONTINUED | OUTPATIENT
Start: 2025-06-03 | End: 2025-06-03

## 2025-06-03 RX ORDER — CEFAZOLIN SODIUM 1 G/3ML
INJECTION, POWDER, FOR SOLUTION INTRAMUSCULAR; INTRAVENOUS
Status: DISCONTINUED | OUTPATIENT
Start: 2025-06-03 | End: 2025-06-03

## 2025-06-03 RX ORDER — CEFAZOLIN 2 G/1
2 INJECTION, POWDER, FOR SOLUTION INTRAMUSCULAR; INTRAVENOUS
Status: DISCONTINUED | OUTPATIENT
Start: 2025-06-03 | End: 2025-06-03 | Stop reason: HOSPADM

## 2025-06-03 RX ORDER — LIDOCAINE HYDROCHLORIDE 10 MG/ML
INJECTION, SOLUTION INFILTRATION; PERINEURAL
Status: DISCONTINUED | OUTPATIENT
Start: 2025-06-03 | End: 2025-06-03 | Stop reason: HOSPADM

## 2025-06-03 RX ORDER — OXYCODONE HYDROCHLORIDE 5 MG/1
5 TABLET ORAL
Status: DISCONTINUED | OUTPATIENT
Start: 2025-06-03 | End: 2025-06-03 | Stop reason: HOSPADM

## 2025-06-03 RX ORDER — HYDROMORPHONE HYDROCHLORIDE 2 MG/ML
0.2 INJECTION, SOLUTION INTRAMUSCULAR; INTRAVENOUS; SUBCUTANEOUS EVERY 5 MIN PRN
Status: DISCONTINUED | OUTPATIENT
Start: 2025-06-03 | End: 2025-06-03 | Stop reason: HOSPADM

## 2025-06-03 RX ORDER — ROCURONIUM BROMIDE 10 MG/ML
INJECTION, SOLUTION INTRAVENOUS
Status: DISCONTINUED | OUTPATIENT
Start: 2025-06-03 | End: 2025-06-03

## 2025-06-03 RX ORDER — HYDROCODONE BITARTRATE AND ACETAMINOPHEN 5; 325 MG/1; MG/1
1 TABLET ORAL EVERY 4 HOURS PRN
Status: DISCONTINUED | OUTPATIENT
Start: 2025-06-03 | End: 2025-06-03 | Stop reason: HOSPADM

## 2025-06-03 RX ORDER — HEPARIN SODIUM 10000 [USP'U]/ML
INJECTION, SOLUTION INTRAVENOUS; SUBCUTANEOUS
Status: DISCONTINUED | OUTPATIENT
Start: 2025-06-03 | End: 2025-06-03 | Stop reason: HOSPADM

## 2025-06-03 RX ADMIN — ROCURONIUM BROMIDE 50 MG: 10 INJECTION, SOLUTION INTRAVENOUS at 10:06

## 2025-06-03 RX ADMIN — DEXAMETHASONE SODIUM PHOSPHATE 4 MG: 4 INJECTION, SOLUTION INTRA-ARTICULAR; INTRALESIONAL; INTRAMUSCULAR; INTRAVENOUS; SOFT TISSUE at 11:06

## 2025-06-03 RX ADMIN — PHENYLEPHRINE HYDROCHLORIDE 200 MCG: 10 INJECTION INTRAVENOUS at 11:06

## 2025-06-03 RX ADMIN — SODIUM CHLORIDE: 0.9 INJECTION, SOLUTION INTRAVENOUS at 10:06

## 2025-06-03 RX ADMIN — PHENYLEPHRINE HYDROCHLORIDE 100 MCG: 10 INJECTION INTRAVENOUS at 11:06

## 2025-06-03 RX ADMIN — PROPOFOL 100 MG: 10 INJECTION, EMULSION INTRAVENOUS at 10:06

## 2025-06-03 RX ADMIN — HYDROMORPHONE HYDROCHLORIDE 0.2 MG: 2 INJECTION INTRAMUSCULAR; INTRAVENOUS; SUBCUTANEOUS at 01:06

## 2025-06-03 RX ADMIN — FENTANYL CITRATE 50 MCG: 0.05 INJECTION, SOLUTION INTRAMUSCULAR; INTRAVENOUS at 10:06

## 2025-06-03 RX ADMIN — ROCURONIUM BROMIDE 20 MG: 10 INJECTION, SOLUTION INTRAVENOUS at 11:06

## 2025-06-03 RX ADMIN — OXYCODONE 5 MG: 5 TABLET ORAL at 12:06

## 2025-06-03 RX ADMIN — HYDROMORPHONE HYDROCHLORIDE 0.2 MG: 2 INJECTION INTRAMUSCULAR; INTRAVENOUS; SUBCUTANEOUS at 12:06

## 2025-06-03 RX ADMIN — LIDOCAINE HYDROCHLORIDE 100 MG: 20 INJECTION, SOLUTION INTRAVENOUS at 10:06

## 2025-06-03 RX ADMIN — SUGAMMADEX 200 MG: 100 INJECTION, SOLUTION INTRAVENOUS at 11:06

## 2025-06-03 RX ADMIN — CEFAZOLIN 2 G: 330 INJECTION, POWDER, FOR SOLUTION INTRAMUSCULAR; INTRAVENOUS at 11:06

## 2025-06-06 ENCOUNTER — TELEPHONE (OUTPATIENT)
Dept: SURGERY | Facility: CLINIC | Age: 60
End: 2025-06-06
Payer: MEDICARE

## 2025-06-09 ENCOUNTER — PATIENT OUTREACH (OUTPATIENT)
Facility: OTHER | Age: 60
End: 2025-06-09
Payer: MEDICARE

## 2025-06-09 RX ORDER — SEMAGLUTIDE 0.68 MG/ML
0.5 INJECTION, SOLUTION SUBCUTANEOUS
COMMUNITY
Start: 2025-05-29 | End: 2025-06-09 | Stop reason: SDUPTHER

## 2025-06-09 RX ORDER — SEMAGLUTIDE 0.68 MG/ML
0.5 INJECTION, SOLUTION SUBCUTANEOUS
Qty: 9 ML | Refills: 0 | Status: SHIPPED | OUTPATIENT
Start: 2025-06-09 | End: 2025-09-07

## 2025-06-10 ENCOUNTER — PATIENT OUTREACH (OUTPATIENT)
Dept: ADMINISTRATIVE | Facility: HOSPITAL | Age: 60
End: 2025-06-10
Payer: MEDICARE

## 2025-06-10 NOTE — PROGRESS NOTES
Health Maintenance Topic(s) Outreach Outcomes & Actions Taken:    Breast Cancer Screening - Outreach Outcomes & Actions Taken  : Message sent to patient

## 2025-06-13 DIAGNOSIS — G89.21 CHRONIC PAIN AFTER AMPUTATION: ICD-10-CM

## 2025-06-13 DIAGNOSIS — Z89.9 CHRONIC PAIN AFTER AMPUTATION: ICD-10-CM

## 2025-06-13 RX ORDER — PREGABALIN 75 MG/1
75 CAPSULE ORAL DAILY
Qty: 7 CAPSULE | Refills: 0 | Status: SHIPPED | OUTPATIENT
Start: 2025-06-13

## 2025-06-18 ENCOUNTER — PATIENT OUTREACH (OUTPATIENT)
Dept: ADMINISTRATIVE | Facility: HOSPITAL | Age: 60
End: 2025-06-18
Payer: MEDICARE

## 2025-06-19 NOTE — PROGRESS NOTES
[facility]  Infectious Disease  Progress Note    Patient Name: Suyapa Connelly  MRN: [id number]  Admission Date: 7/17/2020  Length of Stay: 7 days  Attending Physician: Silvestre Espana MD  Primary Care Provider: Erlinda Rahman NP    Isolation Status: No active isolations  Assessment/Plan:      * Acute osteomyelitis of left calcaneus  Osteomyelitis of the calcaneous presumed to be acute however pending pathology for confirmation. Cultures now with Enterococcus, E cloacae, and GBS. Afebrile and with resolved leukocytosis which was likely a leukemoid reaction to steroids.   · Patient has tolerated cephalosporins in past. Recommend switching aztreonam to cefepime 2 gm IV every 12 hours as it is unclear when her surgical intervention will occur.    · Continue vancomycin.   · Patient likely to require IV antibiotics. Please discuss PICC vs subclavian catheter for IV antibiotics with nephrology.       Infection due to enterococcus  See above      Gram-negative infection  See above        Anticipated Disposition: per primary    Thank you for your consult. I will follow-up with patient. Please contact us if you have any additional questions.    Verenice Myles MD  Infectious Disease  [facility]    Subjective:     Principal Problem:Acute osteomyelitis of left calcaneus    HPI: A 54-year-old woman with CAD, HF-EF 35%, PAD, s/p bilateral lower extremity stents, DM 2,  Anxiety, depression, and chronic open wound of the left heel who developed malodorous drainage from her wound. This was associated with pain and hyperglycemia. She was evaluated on  ED where she was afebrile and without leukocytosis. Further work up revealed elevated creatinine level, an ESR of 139, and CRP of 9.2. MRI showed evidence of osteomyelitis of the calcaneous. She was evaluated by Podiatry and subsequently underwent bone biopsy for pathology and culture. Cultures with Enterococcus pending identification and  sensitivities.     Mrs. Connelly has allergy to penicillin, Bactrim, and clindamycin. She has a pet dog. She smoke marijuana occasionally. She enjoys fishing.     Infectious Diseases consulted for management recommendations.     Interval History: ". 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 Wound stable. Unable to undergo angiogram due to creatinine level. Pending angiogram, possible revascularization, and debridement of infected bone tissue. Still awaiting vascular intervention.         Review of Systems   Unable to perform ROS: Other     Objective:     Vital Signs (Most Recent):  Temp: 98.6 °F (37 °C) (07/24/20 1202)  Pulse: 92 (07/24/20 1202)  Resp: 18 (07/24/20 1202)  BP: (!) 140/67 (07/24/20 1202)  SpO2: 100 % (07/24/20 1202) Vital Signs (24h Range):  Temp:  [97.5 °F (36.4 °C)-98.6 °F (37 °C)] 98.6 °F (37 °C)  Pulse:  [] 92  Resp:  [16-20] 18  SpO2:  [94 %-100 %] 100 %  BP: (123-171)/(67-86) 140/67     Weight: 87.5 kg (192 lb 14.4 oz)  Body mass index is 32.1 kg/m².    Estimated Creatinine Clearance: 59.2 mL/min (based on SCr of 1.2 mg/dL).    Physical Exam  Vitals signs and nursing note reviewed.   Constitutional:       General: She is sleeping.   Pulmonary:      Effort: Pulmonary effort is normal.   Musculoskeletal:      Comments: Left foot wrapped with gauze dressing.          Significant Labs:   Blood Culture:   Recent Labs   Lab 07/17/20  1631 07/17/20  1712   LABBLOO No growth after 5 days. No growth after 5 days.     BMP:   Recent Labs   Lab 07/24/20  0511   *   *   K 4.3      CO2 21*   BUN 29*   CREATININE 1.2   CALCIUM 8.1*   MG 1.8     CBC:   Recent Labs   Lab 07/23/20  0419   WBC 8.51   HGB 9.4*   HCT 29.8*   *     Wound Culture:   Recent Labs   Lab 07/18/20  0929   LABAERO ENTEROCOCCUS FAECALIS  Moderate  *  STREPTOCOCCUS AGALACTIAE (GROUP B)  Few  Beta-hemolytic streptococci are routinely susceptible to    penicillins,cephalosporins and carbapenems.  *  ENTEROBACTER CLOACAE  Rare  *       Significant Imaging: I have reviewed all pertinent imaging results/findings within the past 24 hours.     [see HPI] : see HPI [Sleep Disturbances] : sleep disturbances [Anxiety] : anxiety [Negative] : Eyes

## 2025-07-01 ENCOUNTER — TELEPHONE (OUTPATIENT)
Dept: OPHTHALMOLOGY | Facility: CLINIC | Age: 60
End: 2025-07-01
Payer: MEDICARE

## 2025-07-01 NOTE — TELEPHONE ENCOUNTER
Pt's phone goes straight to [facility].   Left messages for pt  Pt booked 07/03 to see Dr. Orr   Copied from CRM #[id number]. Topic: Appointments - Appointment Access  >> Jul 1, 2025 12:35 PM Yony wrote:  Reschedule Existing Appointment     Current appt date: 7/1     Type of appt : Urgent poss NVG     Physician: Dr. Orr     Reason for rescheduling:  Couldn't make appt due to dialysis     Caller:  Randell     Contact Preference:  [phone]

## 2025-07-03 ENCOUNTER — HOSPITAL ENCOUNTER (INPATIENT)
Facility: HOSPITAL | Age: 60
LOS: 7 days | Discharge: HOME | DRG: 291 | End: 2025-07-12
Attending: EMERGENCY MEDICINE | Admitting: INTERNAL MEDICINE
Payer: MEDICARE

## 2025-07-03 ENCOUNTER — OFFICE VISIT (OUTPATIENT)
Dept: OPHTHALMOLOGY | Facility: CLINIC | Age: 60
End: 2025-07-03
Payer: MEDICARE

## 2025-07-03 DIAGNOSIS — T82.868D THROMBOSIS OF FEMORO-POPLITEAL BYPASS GRAFT, SUBSEQUENT ENCOUNTER: ICD-10-CM

## 2025-07-03 DIAGNOSIS — H25.13 NUCLEAR SCLEROTIC CATARACT OF BOTH EYES: ICD-10-CM

## 2025-07-03 DIAGNOSIS — H57.11 PAIN OF RIGHT EYE: Primary | ICD-10-CM

## 2025-07-03 DIAGNOSIS — R94.31 ABNORMAL EKG: ICD-10-CM

## 2025-07-03 DIAGNOSIS — H11.31 SUBCONJUNCTIVAL HEMORRHAGE OF RIGHT EYE: ICD-10-CM

## 2025-07-03 DIAGNOSIS — H01.01A ULCERATIVE BLEPHARITIS OF UPPER AND LOWER EYELIDS OF BOTH EYES: ICD-10-CM

## 2025-07-03 DIAGNOSIS — E87.5 HYPERKALEMIA: Primary | ICD-10-CM

## 2025-07-03 DIAGNOSIS — H01.01B ULCERATIVE BLEPHARITIS OF UPPER AND LOWER EYELIDS OF BOTH EYES: ICD-10-CM

## 2025-07-03 LAB
ALBUMIN SERPL BCP-MCNC: 3.7 G/DL (ref 3.5–5.2)
ALP SERPL-CCNC: 193 UNIT/L (ref 40–150)
ALT SERPL W/O P-5'-P-CCNC: 10 UNIT/L (ref 10–44)
ANION GAP (OHS): 22 MMOL/L (ref 8–16)
APPEARANCE FLD: CLEAR
AST SERPL-CCNC: 18 UNIT/L (ref 11–45)
BILIRUB SERPL-MCNC: 0.7 MG/DL (ref 0.1–1)
BUN SERPL-MCNC: 113 MG/DL (ref 6–20)
BUN SERPL-MCNC: 121 MG/DL (ref 6–30)
CALCIUM SERPL-MCNC: 8.8 MG/DL (ref 8.7–10.5)
CHLORIDE SERPL-SCNC: 101 MMOL/L (ref 95–110)
CHLORIDE SERPL-SCNC: 96 MMOL/L (ref 95–110)
CO2 SERPL-SCNC: 11 MMOL/L (ref 23–29)
COLOR FLD: YELLOW
CREAT SERPL-MCNC: 11.1 MG/DL (ref 0.5–1.4)
CREAT SERPL-MCNC: 9.8 MG/DL (ref 0.5–1.4)
EAG (OHS): 131 MG/DL (ref 68–131)
ERYTHROCYTE [DISTWIDTH] IN BLOOD BY AUTOMATED COUNT: 18.2 % (ref 11.5–14.5)
GFR SERPLBLD CREATININE-BSD FMLA CKD-EPI: 4 ML/MIN/1.73/M2
GLUCOSE SERPL-MCNC: 142 MG/DL (ref 70–110)
GLUCOSE SERPL-MCNC: 84 MG/DL (ref 70–110)
HBA1C MFR BLD: 6.2 % (ref 4–5.6)
HCT VFR BLD AUTO: 36.9 % (ref 37–48.5)
HCT VFR BLD CALC: 29 %PCV (ref 36–54)
HCV AB SERPL QL IA: NORMAL
HGB BLD-MCNC: 11.4 GM/DL (ref 12–16)
HIV 1+2 AB+HIV1 P24 AG SERPL QL IA: NORMAL
LYMPHOCYTES NFR FLD MANUAL: 8 %
MCH RBC QN AUTO: 30.6 PG (ref 27–31)
MCHC RBC AUTO-ENTMCNC: 30.9 G/DL (ref 32–36)
MCV RBC AUTO: 99 FL (ref 82–98)
MESOTHL CELL NFR FLD MANUAL: 20 %
MONOS+MACROS NFR FLD MANUAL: 40 %
NEUTROPHILS NFR FLD MANUAL: 32 %
OHS QRS DURATION: 112 MS
OHS QTC CALCULATION: 492 MS
PLATELET # BLD AUTO: 244 K/UL (ref 150–450)
PMV BLD AUTO: 10.8 FL (ref 9.2–12.9)
POC IONIZED CALCIUM: 1.05 MMOL/L (ref 1.06–1.42)
POC TCO2 (MEASURED): 15 MMOL/L (ref 23–29)
POTASSIUM BLD-SCNC: 6 MMOL/L (ref 3.5–5.1)
POTASSIUM SERPL-SCNC: 7 MMOL/L (ref 3.5–5.1)
PROT SERPL-MCNC: 9.2 GM/DL (ref 6–8.4)
RBC # BLD AUTO: 3.72 M/UL (ref 4–5.4)
SAMPLE: ABNORMAL
SODIUM BLD-SCNC: 131 MMOL/L (ref 136–145)
SODIUM SERPL-SCNC: 129 MMOL/L (ref 136–145)
WBC # BLD AUTO: 6.99 K/UL (ref 3.9–12.7)
WBC # FLD: 222 /CU MM

## 2025-07-03 PROCEDURE — 89051 BODY FLUID CELL COUNT: CPT | Mod: HCNC | Performed by: INTERNAL MEDICINE

## 2025-07-03 PROCEDURE — G0378 HOSPITAL OBSERVATION PER HR: HCPCS | Mod: HCNC

## 2025-07-03 PROCEDURE — 87102 FUNGUS ISOLATION CULTURE: CPT | Mod: HCNC | Performed by: INTERNAL MEDICINE

## 2025-07-03 PROCEDURE — 51798 US URINE CAPACITY MEASURE: CPT | Mod: HCNC

## 2025-07-03 PROCEDURE — 86803 HEPATITIS C AB TEST: CPT | Mod: HCNC | Performed by: PHYSICIAN ASSISTANT

## 2025-07-03 PROCEDURE — 87205 SMEAR GRAM STAIN: CPT | Mod: HCNC | Performed by: INTERNAL MEDICINE

## 2025-07-03 PROCEDURE — 25000003 PHARM REV CODE 250: Mod: HCNC | Performed by: INTERNAL MEDICINE

## 2025-07-03 PROCEDURE — 99215 OFFICE O/P EST HI 40 MIN: CPT | Mod: HCNC,GC,, | Performed by: STUDENT IN AN ORGANIZED HEALTH CARE EDUCATION/TRAINING PROGRAM

## 2025-07-03 PROCEDURE — 96365 THER/PROPH/DIAG IV INF INIT: CPT | Mod: HCNC

## 2025-07-03 PROCEDURE — 80053 COMPREHEN METABOLIC PANEL: CPT | Mod: HCNC | Performed by: EMERGENCY MEDICINE

## 2025-07-03 PROCEDURE — 63600175 PHARM REV CODE 636 W HCPCS: Mod: HCNC | Performed by: INTERNAL MEDICINE

## 2025-07-03 PROCEDURE — 25000242 PHARM REV CODE 250 ALT 637 W/ HCPCS: Mod: HCNC | Performed by: EMERGENCY MEDICINE

## 2025-07-03 PROCEDURE — 94640 AIRWAY INHALATION TREATMENT: CPT | Mod: HCNC

## 2025-07-03 PROCEDURE — 27200950 HC CAPD SUPPORT: Mod: HCNC

## 2025-07-03 PROCEDURE — 83036 HEMOGLOBIN GLYCOSYLATED A1C: CPT | Mod: HCNC | Performed by: INTERNAL MEDICINE

## 2025-07-03 PROCEDURE — 80047 BASIC METABLC PNL IONIZED CA: CPT | Mod: HCNC

## 2025-07-03 PROCEDURE — 93010 ELECTROCARDIOGRAM REPORT: CPT | Mod: HCNC,,, | Performed by: INTERNAL MEDICINE

## 2025-07-03 PROCEDURE — 5A1D70Z PERFORMANCE OF URINARY FILTRATION, INTERMITTENT, LESS THAN 6 HOURS PER DAY: ICD-10-PCS | Performed by: EMERGENCY MEDICINE

## 2025-07-03 PROCEDURE — 87070 CULTURE OTHR SPECIMN AEROBIC: CPT | Mod: HCNC | Performed by: INTERNAL MEDICINE

## 2025-07-03 PROCEDURE — 85027 COMPLETE CBC AUTOMATED: CPT | Mod: HCNC | Performed by: EMERGENCY MEDICINE

## 2025-07-03 PROCEDURE — 99285 EMERGENCY DEPT VISIT HI MDM: CPT | Mod: 25,HCNC

## 2025-07-03 PROCEDURE — 90945 DIALYSIS ONE EVALUATION: CPT | Mod: HCNC

## 2025-07-03 PROCEDURE — 3E1M39Z IRRIGATION OF PERITONEAL CAVITY USING DIALYSATE, PERCUTANEOUS APPROACH: ICD-10-PCS | Performed by: INTERNAL MEDICINE

## 2025-07-03 PROCEDURE — 99999 PR PBB SHADOW E&M-EST. PATIENT-LVL III: CPT | Mod: PBBFAC,HCNC,, | Performed by: OPHTHALMOLOGY

## 2025-07-03 PROCEDURE — 93005 ELECTROCARDIOGRAM TRACING: CPT | Mod: HCNC

## 2025-07-03 PROCEDURE — 80100014 HC HEMODIALYSIS 1:1: Mod: HCNC

## 2025-07-03 PROCEDURE — 25000003 PHARM REV CODE 250: Mod: HCNC | Performed by: EMERGENCY MEDICINE

## 2025-07-03 PROCEDURE — 87389 HIV-1 AG W/HIV-1&-2 AB AG IA: CPT | Mod: HCNC | Performed by: PHYSICIAN ASSISTANT

## 2025-07-03 PROCEDURE — 63600175 PHARM REV CODE 636 W HCPCS: Mod: HCNC | Performed by: EMERGENCY MEDICINE

## 2025-07-03 PROCEDURE — 94761 N-INVAS EAR/PLS OXIMETRY MLT: CPT | Mod: HCNC

## 2025-07-03 PROCEDURE — 96375 TX/PRO/DX INJ NEW DRUG ADDON: CPT | Mod: HCNC

## 2025-07-03 RX ORDER — FUROSEMIDE 40 MG/1
40 TABLET ORAL 2 TIMES DAILY
Status: DISCONTINUED | OUTPATIENT
Start: 2025-07-03 | End: 2025-07-12 | Stop reason: HOSPADM

## 2025-07-03 RX ORDER — ALLOPURINOL 100 MG/1
100 TABLET ORAL DAILY
Status: DISCONTINUED | OUTPATIENT
Start: 2025-07-04 | End: 2025-07-12 | Stop reason: HOSPADM

## 2025-07-03 RX ORDER — INSULIN ASPART 100 [IU]/ML
0-5 INJECTION, SOLUTION INTRAVENOUS; SUBCUTANEOUS
Status: DISCONTINUED | OUTPATIENT
Start: 2025-07-03 | End: 2025-07-12 | Stop reason: HOSPADM

## 2025-07-03 RX ORDER — HYDROCODONE BITARTRATE AND ACETAMINOPHEN 5; 325 MG/1; MG/1
1 TABLET ORAL EVERY 6 HOURS PRN
Refills: 0 | Status: DISCONTINUED | OUTPATIENT
Start: 2025-07-03 | End: 2025-07-06

## 2025-07-03 RX ORDER — CARVEDILOL 6.25 MG/1
6.25 TABLET ORAL 2 TIMES DAILY
Status: DISCONTINUED | OUTPATIENT
Start: 2025-07-03 | End: 2025-07-12 | Stop reason: HOSPADM

## 2025-07-03 RX ORDER — SODIUM CHLORIDE 9 MG/ML
INJECTION, SOLUTION INTRAVENOUS ONCE
Status: DISCONTINUED | OUTPATIENT
Start: 2025-07-03 | End: 2025-07-12 | Stop reason: HOSPADM

## 2025-07-03 RX ORDER — SERTRALINE HYDROCHLORIDE 100 MG/1
100 TABLET, FILM COATED ORAL DAILY
Status: DISCONTINUED | OUTPATIENT
Start: 2025-07-04 | End: 2025-07-12 | Stop reason: HOSPADM

## 2025-07-03 RX ORDER — SODIUM BICARBONATE 650 MG/1
650 TABLET ORAL 2 TIMES DAILY
Status: DISCONTINUED | OUTPATIENT
Start: 2025-07-03 | End: 2025-07-12 | Stop reason: HOSPADM

## 2025-07-03 RX ORDER — CLOPIDOGREL BISULFATE 75 MG/1
75 TABLET ORAL DAILY
Status: DISCONTINUED | OUTPATIENT
Start: 2025-07-04 | End: 2025-07-12 | Stop reason: HOSPADM

## 2025-07-03 RX ORDER — FAMOTIDINE 20 MG/1
20 TABLET, FILM COATED ORAL
Status: DISCONTINUED | OUTPATIENT
Start: 2025-07-04 | End: 2025-07-12 | Stop reason: HOSPADM

## 2025-07-03 RX ORDER — IBUPROFEN 200 MG
24 TABLET ORAL
Status: DISCONTINUED | OUTPATIENT
Start: 2025-07-03 | End: 2025-07-12 | Stop reason: HOSPADM

## 2025-07-03 RX ORDER — ACETAMINOPHEN 325 MG/1
650 TABLET ORAL EVERY 8 HOURS PRN
Status: DISCONTINUED | OUTPATIENT
Start: 2025-07-03 | End: 2025-07-12 | Stop reason: HOSPADM

## 2025-07-03 RX ORDER — IBUPROFEN 200 MG
16 TABLET ORAL
Status: DISCONTINUED | OUTPATIENT
Start: 2025-07-03 | End: 2025-07-12 | Stop reason: HOSPADM

## 2025-07-03 RX ORDER — SODIUM CHLORIDE 0.9 % (FLUSH) 0.9 %
10 SYRINGE (ML) INJECTION
Status: DISCONTINUED | OUTPATIENT
Start: 2025-07-03 | End: 2025-07-12 | Stop reason: HOSPADM

## 2025-07-03 RX ORDER — CINACALCET 30 MG/1
30 TABLET, FILM COATED ORAL
Status: DISCONTINUED | OUTPATIENT
Start: 2025-07-04 | End: 2025-07-12 | Stop reason: HOSPADM

## 2025-07-03 RX ORDER — SODIUM CHLORIDE 9 MG/ML
INJECTION, SOLUTION INTRAVENOUS
Status: DISCONTINUED | OUTPATIENT
Start: 2025-07-03 | End: 2025-07-12 | Stop reason: HOSPADM

## 2025-07-03 RX ORDER — HYDRALAZINE HYDROCHLORIDE 10 MG/1
10 TABLET, FILM COATED ORAL EVERY 12 HOURS
Status: DISCONTINUED | OUTPATIENT
Start: 2025-07-03 | End: 2025-07-12 | Stop reason: HOSPADM

## 2025-07-03 RX ORDER — ALBUTEROL SULFATE 2.5 MG/.5ML
10 SOLUTION RESPIRATORY (INHALATION)
Status: COMPLETED | OUTPATIENT
Start: 2025-07-03 | End: 2025-07-03

## 2025-07-03 RX ORDER — ATORVASTATIN CALCIUM 40 MG/1
80 TABLET, FILM COATED ORAL DAILY
Status: DISCONTINUED | OUTPATIENT
Start: 2025-07-04 | End: 2025-07-12 | Stop reason: HOSPADM

## 2025-07-03 RX ORDER — SEVELAMER CARBONATE 2.4 G/1
1 POWDER, FOR SUSPENSION ORAL
Status: DISCONTINUED | OUTPATIENT
Start: 2025-07-03 | End: 2025-07-12 | Stop reason: HOSPADM

## 2025-07-03 RX ORDER — ISOSORBIDE DINITRATE 5 MG/1
5 TABLET ORAL 2 TIMES DAILY
Status: DISCONTINUED | OUTPATIENT
Start: 2025-07-03 | End: 2025-07-12 | Stop reason: HOSPADM

## 2025-07-03 RX ORDER — GLUCAGON 1 MG
1 KIT INJECTION
Status: DISCONTINUED | OUTPATIENT
Start: 2025-07-03 | End: 2025-07-12 | Stop reason: HOSPADM

## 2025-07-03 RX ORDER — HEPARIN SODIUM 1000 [USP'U]/ML
1000 INJECTION, SOLUTION INTRAVENOUS; SUBCUTANEOUS
Status: DISPENSED | OUTPATIENT
Start: 2025-07-03 | End: 2025-07-04

## 2025-07-03 RX ORDER — CALCIUM GLUCONATE 20 MG/ML
1 INJECTION, SOLUTION INTRAVENOUS
Status: COMPLETED | OUTPATIENT
Start: 2025-07-03 | End: 2025-07-03

## 2025-07-03 RX ADMIN — SEVELAMER CARBONATE 2.4 G: 2400 POWDER, FOR SUSPENSION ORAL at 08:07

## 2025-07-03 RX ADMIN — CALCIUM GLUCONATE 1 G: 20 INJECTION, SOLUTION INTRAVENOUS at 03:07

## 2025-07-03 RX ADMIN — FUROSEMIDE 40 MG: 40 TABLET ORAL at 10:07

## 2025-07-03 RX ADMIN — INSULIN HUMAN 5 UNITS: 100 INJECTION, SOLUTION PARENTERAL at 03:07

## 2025-07-03 RX ADMIN — HYDRALAZINE HYDROCHLORIDE 10 MG: 10 TABLET, FILM COATED ORAL at 10:07

## 2025-07-03 RX ADMIN — CARVEDILOL 6.25 MG: 6.25 TABLET, FILM COATED ORAL at 10:07

## 2025-07-03 RX ADMIN — TRAZODONE HYDROCHLORIDE 25 MG: 50 TABLET ORAL at 11:07

## 2025-07-03 RX ADMIN — HEPARIN SODIUM 1000 UNITS: 1000 INJECTION INTRAVENOUS; SUBCUTANEOUS at 07:07

## 2025-07-03 RX ADMIN — DEXTROSE MONOHYDRATE 25 G: 25 INJECTION, SOLUTION INTRAVENOUS at 03:07

## 2025-07-03 RX ADMIN — SODIUM BICARBONATE 650 MG TABLET 650 MG: at 10:07

## 2025-07-03 RX ADMIN — APIXABAN 2.5 MG: 2.5 TABLET, FILM COATED ORAL at 10:07

## 2025-07-03 RX ADMIN — ALBUTEROL SULFATE 10 MG: 2.5 SOLUTION RESPIRATORY (INHALATION) at 02:07

## 2025-07-03 RX ADMIN — ISOSORBIDE DINITRATE 5 MG: 5 TABLET ORAL at 10:07

## 2025-07-03 RX ADMIN — SACUBITRIL AND VALSARTAN 1 TABLET: 24; 26 TABLET, FILM COATED ORAL at 11:07

## 2025-07-03 NOTE — PROGRESS NOTES
HPI    ED follow up    Patient here for eye pain/elevated IOP OD. Patient states having eye pain   OD for the past 5 days. Vision has gotten blurrier OD >OS since the pain   started. Also seeing flashes/floaters OD since eye pain started. Also   light sensitive OU.   Hasn't been seen by an eye doctor in over 2 years. Never diagnosed with   any eye diseases in the past. Wasn't given any drops/medication in the ED.    DM2  HTN  End stage renal disease (on dialysis)  Bilateral amputation     Last edited by Fely Golden MA on 7/3/2025  9:56 AM.            Assessment /Plan     For exam results, see Encounter Report.    Pain of right eye    Ulcerative blepharitis of upper and lower eyelids of both eyes    Nuclear sclerotic cataract of both eyes    Subconjunctival hemorrhage of right eye          Patient with  & engaged  Ayesha      Referred from ER 06/30/2025  Right eye pain //// Temporal pain @ 1 week  CT Brain sc acute process      07/03/2025  Presents with   Concern for UTI & increasing confusion // Delirium    Multiple Med issues  Dialysis --> convert from hemo to peritoneal at home  DM  Tyrell BKA  Vascular dementia severe  RA  Meningioma ?    Right eye pain  GCA low suspect     CASSIDY OD Self eye rubbing  Noted 07/03/2025 reassurance  + Blepharitis OD > OS  + excoriation @ lids & nose  Blepharitis: Discussed treatment options including warm compresses, lid scrubs and medications.    EES q HS x 1 week / month  WCs  Lid scrubs     NIDDM  No NVI // NVA // NVG  No BDR // CSME  Control    NSC OU  CE PRN  Observe      Plan  RTC 4 months Establish care with Optometry   RTC sooner prn with good understanding    ==> brought patient to ER for evaluation UTI & confusion // delirium

## 2025-07-03 NOTE — H&P
[facility] - Emergency Dept  [facility] Medicine  History & Physical    Patient Name: Suyapa Connelly  MRN: [id number]  Patient Class: OP- Observation  Admission Date: 7/3/2025  Attending Physician: Randell Garcia MD   Primary Care Provider: Vanessa Noel MD         Patient information was obtained from patient and ER records.     Subjective:     Principal Problem:Hyperkalemia    Chief Complaint:   Chief Complaint   Patient presents with    Dysuria     Pt endorses dysuria x 3 days, pt wheelchair bone.         HPI: 60 yo F with ESRD, previously on thrice-weekly HD, now transitioning to home PD and currently receiving once weekly HD, last session 1 week ago, presenting to ED with dysuria, weakness, HA, N/V. K 7.0. EKG ordered, pending. Calcium, insulin, D50, albuterol, Lokelma ordered for medical management.      reports she is more somnolent, CT of head is pending.    Past Medical History:   Diagnosis Date    Anxiety     Chronic pain syndrome     CKD (chronic kidney disease), stage III     CVD (cardiovascular disease)     Depression     Diabetes mellitus, type 2     GERD (gastroesophageal reflux disease)     Hyperemesis 03/23/2021    Hypokalemia 03/23/2021    Infection of below knee amputation stump 03/12/2022    Osteomyelitis     Osteomyelitis of left foot 04/30/2021    Ulcer of left foot     Vaginal delivery     x1       Past Surgical History:   Procedure Laterality Date    ABOVE-KNEE AMPUTATION Left 5/18/2021    Procedure: AMPUTATION, ABOVE KNEE;  Surgeon: Teddy Huber MD;  Location: [facility] OR [address] [place] [address];  Service: Vascular;  Laterality: Left;    ABOVE-KNEE AMPUTATION Right 3/18/2022    Procedure: AMPUTATION, ABOVE KNEE;  Surgeon: DAYNE Florez II, MD;  Location: [facility] OR [address] [place] [address];  Service: Vascular;  Laterality: Right;    Angiogram - Right Extremity Right 7/9/15    ANGIOGRAM, EXTREMITY, UNILATERAL Left 11/22/2024    Procedure: ANGIOGRAM, EXTREMITY, UNILATERAL;  Surgeon: Yony Lindsey  MD KATHLEEN;  Location: [address] FLR;  Service: Vascular;  Laterality: Left;  Left leg angio, L CFA approach  5.4 min  597.31 mGy  73.8959 Gycm2  20 ml dye    angiogram-left leg  10/6/15    ANGIOGRAPHY OF LOWER EXTREMITY Left 4/29/2021    Procedure: ANGIOGRAM, LOWER EXTREMITY;  Surgeon: Teddy Huber MD;  [address];  Service: Vascular;  Laterality: Left;    BELOW KNEE AMPUTATION OF LOWER EXTREMITY Right 12/28/2021    Procedure: AMPUTATION, BELOW KNEE;  Surgeon: Kaitlyn Rojas MD;  Location: [facility] OR;  Service: General;  Laterality: Right;    CATHETERIZATION OF BOTH LEFT AND RIGHT HEART N/A 12/18/2019    Procedure: CATHETERIZATION, HEART, BOTH LEFT AND RIGHT;  Surgeon: Que Fernando III, MD;  [facility];  Service: Cardiology;  Laterality: N/A;    CORONARY ANGIOGRAPHY N/A 12/18/2019    Procedure: ANGIOGRAM, CORONARY ARTERY;  Surgeon: Que Fernando III, MD;  Location: [facility] CATH LAB;  Service: Cardiology;  Laterality: N/A;    CORONARY ANGIOGRAPHY INCLUDING BYPASS GRAFTS WITH CATHETERIZATION OF LEFT HEART N/A 7/28/2020    Procedure: ANGIOGRAM, CORONARY, INCLUDING BYPASS GRAFT, WITH LEFT HEART CATHETERIZATION, 9 am;  Surgeon: Rachel Easley MD;  Location: [facility] CATH LAB;  Service: Cardiology;  Laterality: N/A;    CORONARY ARTERY BYPASS GRAFT (CABG) N/A 1/14/2020    Procedure: CORONARY ARTERY BYPASS GRAFT (CABG) x 1     Off Pump;  Surgeon: Huang Altamirano MD;  [address];  Service: Cardiovascular;  Laterality: N/A;    CREATION OF FEMORAL-TIBIAL ARTERY BYPASS Left 4/29/2021    Procedure: CREATION, BYPASS, ARTERIAL, FEMORAL TO ANTERIOR TIBIAL;  Surgeon: Teddy Huber MD;  Location: [address];  Service: Vascular;  Laterality: Left;    CREATION OF FEMOROPOPLITEAL ARTERIAL BYPASS USING GRAFT Left 8/18/2020    Procedure: CREATION, BYPASS, ARTERIAL, FEMORAL TO POPLITEAL, USING GRAFT, LEFT LOWER EXTREMITY;  Surgeon: Teddy Huber MD;  Location:  [facility] OR;  Service: Vascular;  Laterality: Left;  REQUEST 7:15 A.M. START----COVID NEGATIVE ON 8/17  1ST CASE STARTE PER LEANA ON 8/7/2020 @ 942AM-LO  RN PREOP 8/12/2020   T/S-----CLEARED BY CARDS-------PENDING INSURANCE    DEBRIDEMENT OF FOOT Left 9/8/2020    Procedure: DEBRIDEMENT, FOOT;  Surgeon: Rosio Mayes DPM;  Location: [facility] OR;  Service: Podiatry;  Laterality: Left;  request neoxx .   RN Pre Op 9-4-2020, Covid negative on 9/5/20. C A    DEBRIDEMENT OF FOOT  3/4/2021    Procedure: DEBRIDEMENT, FOOT;  Surgeon: Teddy Huber MD;  Location: [facility] OR;  Service: Vascular;;    DEBRIDEMENT OF FOOT Left 3/9/2021    Procedure: DEBRIDEMENT, FOOT, bone biopsy;  Surgeon: Rosio Mayes DPM;  Location: [facility] OR;  Service: Podiatry;  Laterality: Left;  Request neoxx---COVID IN AM  REQUESTING NOCARTER GALLOWAY  RN Phone Pre op.On Blood thinners Plavix and Eliquis.  Covid am of surgery. C A    DEBRIDEMENT OF FOOT Left 5/4/2021    Procedure: DEBRIDEMENT, FOOT;  Surgeon: Farooq Morley DPM;  Location: [address];  Service: Podiatry;  Laterality: Left;    ESOPHAGOGASTRODUODENOSCOPY N/A 11/7/2024    Procedure: EGD (ESOPHAGOGASTRODUODENOSCOPY);  Surgeon: Yony Cancino MD;  Location: [facility];  Service: Endoscopy;  Laterality: N/A;    INSERTION OF TUNNELED CENTRAL VENOUS HEMODIALYSIS CATHETER N/A 1/27/2020    Procedure: Insertion, Catheter, Central Venous, Hemodialysis;  Surgeon: ESTEBAN Gomez III, MD;  Location: [facility] CATH LAB;  Service: Peripheral Vascular;  Laterality: N/A;    INSERTION OF TUNNELED CENTRAL VENOUS HEMODIALYSIS CATHETER  5/10/2023    Procedure: Insertion, Catheter, Central Venous, Hemodialysis;  Surgeon: Romulo Queen MD;  Location: [facility] CATH LAB;  Service: Cardiology;;    INSERTION, CATHETER, DIALYSIS, PERITONEAL, LAPAROSCOPIC N/A 6/3/2025    Procedure: INSERTION, CATHETER, DIALYSIS, PERITONEAL, LAPAROSCOPIC;  Surgeon: Kvng Tai MD;  Location: [facility];  Service:  General;  Laterality: N/A;    IRRIGATION AND DEBRIDEMENT OF LOWER EXTREMITY Left 11/22/2024    Procedure: IRRIGATION AND DEBRIDEMENT, LOWER EXTREMITY;  Surgeon: Yony Lindsey MD;  [facility];  Service: Vascular;  Laterality: Left;    MAGNETIC RESONANCE IMAGING Left 11/19/2024    Procedure: MRI (Magnetic Resonance Imagine);  Surgeon: Sophia Hernandez;  Location: [facility];  Service: Anesthesiology;  Laterality: Left;    PERCUTANEOUS TRANSLUMINAL ANGIOPLASTY N/A 3/4/2021    Procedure: PTA (ANGIOPLASTY, PERCUTANEOUS, TRANSLUMINAL);  Surgeon: Teddy Huber MD;  Location: [facility];  Service: Vascular;  Laterality: N/A;    REMOVAL OF ARTERIOVENOUS GRAFT Left 5/27/2021    Procedure: REMOVAL, GRAFT, LEFT LOWER EXTREMITY, WOUND EXPLORATION;  Surgeon: Teddy Huber MD;  [address]  Service: Vascular;  Laterality: Left;    REMOVAL OF NAIL OF DIGIT Left 3/9/2021    Procedure: REMOVAL, NAIL, DIGIT;  Surgeon: Rosio Mayes DPM;  Location: [facility] OR;  Service: Podiatry;  Laterality: Left;    RIGHT HEART CATHETERIZATION Right 5/10/2023    Procedure: INSERTION, CATHETER, RIGHT HEART;  Surgeon: Romulo Queen MD;  Location: [facility] CATH LAB;  Service: Cardiology;  Laterality: Right;    STENT, ILIAC ARTERY Left 11/22/2024    Procedure: STENT, ILIAC ARTERY;  Surgeon: Yony Lindsey MD;  [address];  Service: Vascular;  Laterality: Left;    THROMBECTOMY Left 3/4/2021    Procedure: THROMBECTOMY, LEFT LOWER EXTREMITY BYPASS GRAFT, ANGIOGRAM, POSSIBLE INTERVENTION, POSSIBLE LEFT LOWER EXTREMITY BYPASS;  Surgeon: Teddy Huber MD;  Location: [facility] OR;  Service: Vascular;  Laterality: Left;    THROMBECTOMY Left 4/29/2021    Procedure: GRAFT THROMBECTOMY, LEFT LOWER EXTREMITY;  Surgeon: Teddy Huber MD;  [address]  Service: Vascular;  Laterality: Left;  14.5 min  1179.85 mGy  341.01 Gycm2  240 ml dye    THROMBECTOMY  10/22/2021     Procedure: THROMBECTOMY;  Surgeon: Saad Arenas MD;  Location: [facility] CATH LAB/EP;  Service: Cardiology;;       Review of patient's allergies indicates:   Allergen Reactions    Ciprofloxacin Itching    Pcn [penicillins]      Rash; tolerated ceftriaxone on 1/13/20  Tolerating Augmentin November 2024       No current facility-administered medications on file prior to encounter.     Current Outpatient Medications on File Prior to Encounter   Medication Sig    acetaminophen (TYLENOL) 325 MG tablet Take 2 tablets (650 mg total) by mouth every 8 (eight) hours as needed for Pain.    allopurinoL (ZYLOPRIM) 100 MG tablet TAKE 1/2 TABLET(50 MG) BY MOUTH EVERY OTHER DAY    apixaban (ELIQUIS) 2.5 mg Tab Take 1 tablet (2.5 mg total) by mouth 2 (two) times daily.    atorvastatin (LIPITOR) 80 MG tablet Take 1 tablet (80 mg total) by mouth once daily.    blood sugar diagnostic Strp 1 each by Misc.(Non-Drug; Combo Route) route 4 (four) times daily before meals and nightly.    blood-glucose meter Misc Before meals and at bedtime    blood-glucose meter Misc 1 each by Misc.(Non-Drug; Combo Route) route 3 (three) times daily.    blood-glucose sensor (DEXCOM G7 SENSOR) Radha Apply device and replace every 10 days    calcitRIOL (ROCALTROL) 0.5 MCG Cap Take 1 capsule (0.5 mcg total) by mouth once daily.    calcium acetate,phosphat bind, (PHOSLO) 667 mg tablet Take 1 tablet (667 mg total) by mouth 2 (two) times a day.    carvediloL (COREG) 6.25 MG tablet Take 6.25 mg by mouth 2 (two) times daily.    cinacalcet (SENSIPAR) 30 MG Tab Take 1 tablet (30 mg total) by mouth every Mon, Wed, Fri.    clopidogreL (PLAVIX) 75 mg tablet Take 1 tablet (75 mg total) by mouth once daily.    famotidine (PEPCID) 20 MG tablet TAKE 1 TABLET(20 MG) BY MOUTH TWICE DAILY    furosemide (LASIX) 40 MG tablet Take 1 tablet (40 mg total) by mouth 2 (two) times daily.    hydrALAZINE (APRESOLINE) 10 MG tablet Take 1 tablet (10 mg total) by mouth every 12 (twelve)  "hours.    HYDROcodone-acetaminophen (NORCO) 5-325 mg per tablet Take 1 tablet by mouth every 6 (six) hours as needed for Pain.    insulin aspart U-100 (NOVOLOG FLEXPEN U-100 INSULIN) 100 unit/mL (3 mL) InPn pen Inject 3 Units into the skin 3 (three) times daily with meals.    insulin aspart U-100 (NOVOLOG) 100 unit/mL (3 mL) InPn pen Inject 5 Units into the skin 3 times daily with meals. **MODERATE CORRECTION DOSE**  151-200 mg/dL Pre-meal: 2 units 2200: 1 unit; 201-250 mg/dL Pre-meal 4 units 2200: 2 units; 251-300mg/dL Pre-meal 6 units 2200: 3 units; 301-350mg/dL Pre-meal 8 units 2200: 4 units; >350mg/dL Pre-meal 10 U 2200: 5 U    insulin glargine U-100, Lantus, 100 unit/mL (3 mL) SubQ InPn pen Inject 5 Units into the skin every evening.    isosorbide dinitrate (ISORDIL) 10 MG tablet Take 0.5 tablets (5 mg total) by mouth 2 (two) times daily.    lancets 33 gauge Misc TEST 3 TIMES DAILY    multivit with min-folic acid (ONE DAILY WOMENS 50 PLUS) 0.4 mg Tab Take 1 tablet by mouth once daily.    mupirocin (BACTROBAN) 2 % ointment Apply topically 2 (two) times daily.    OZEMPIC 0.25 mg or 0.5 mg (2 mg/3 mL) pen injector Inject 0.5 mg into the skin every 7 days.    pen needle, diabetic (BD MIGUEL 2ND GEN PEN NEEDLE) 32 gauge x 5/32" Ndle Use to inject insulin once daily    pen needle, diabetic 33 gauge x 5/32" Ndle 1 each by Misc.(Non-Drug; Combo Route) route 4 (four) times daily before meals and nightly.    pregabalin (LYRICA) 75 MG capsule Take 1 capsule (75 mg total) by mouth Daily.    RENVELA 2.4 gram PwPk Take 1 packet by mouth 3 (three) times daily with meals.    sacubitriL-valsartan (ENTRESTO) 24-26 mg per tablet Take 1 tablet by mouth 2 (two) times daily.    senna-docusate (PERICOLACE) 8.6-50 mg per tablet Take 1 tablet by mouth 2 (two) times daily.    sertraline (ZOLOFT) 100 MG tablet Take 1 tablet (100 mg total) by mouth once daily.    sodium bicarbonate 650 MG tablet TAKE 1 TABLET(650 MG) BY MOUTH THREE TIMES " DAILY    traZODone (DESYREL) 50 MG tablet TAKE 1/2 TABLET(25 MG) BY MOUTH EVERY EVENING    zinc oxide 10 % Oint Apply 1 Application topically 2 (two) times a day.    [DISCONTINUED] lancing device Misc 1 Device by Misc.(Non-Drug; Combo Route) route 2 (two) times daily with meals.     Family History       Problem Relation (Age of Onset)    Diabetes Mother, Father, Paternal Grandmother    Heart disease Maternal Grandmother    No Known Problems Maternal Grandfather, Paternal Grandfather          Tobacco Use    Smoking status: Former     Passive exposure: Never    Smokeless tobacco: Never   Substance and Sexual Activity    Alcohol use: No    Drug use: Yes     Types: Marijuana     Comment: occassional    Sexual activity: Yes     Partners: Male     Review of Systems   Constitutional:  Positive for activity change and appetite change.   HENT: Negative.     Eyes: Negative.    Respiratory: Negative.     Cardiovascular: Negative.    Gastrointestinal: Negative.    Endocrine: Negative.    Genitourinary: Negative.    Allergic/Immunologic: Negative.    Neurological: Negative.    Hematological: Negative.    Psychiatric/Behavioral: Negative.       Objective:     Vital Signs (Most Recent):  Temp: 98.3 °F (36.8 °C) (07/03/25 1127)  Pulse: 97 (07/03/25 1612)  Resp: 12 (07/03/25 1612)  BP: 131/60 (07/03/25 1612)  SpO2: 96 % (07/03/25 1612) Vital Signs (24h Range):  Temp:  [98.3 °F (36.8 °C)] 98.3 °F (36.8 °C)  Pulse:  [90-98] 97  Resp:  [10-19] 12  SpO2:  [91 %-97 %] 96 %  BP: (130-140)/(55-81) 131/60     Weight: 59 kg (130 lb)  Body mass index is 21.63 kg/m².     Physical Exam  Constitutional:       General: She is in acute distress.      Appearance: She is ill-appearing.   HENT:      Head: Normocephalic.      Nose: Nose normal.      Mouth/Throat:      Mouth: Mucous membranes are moist.   Cardiovascular:      Rate and Rhythm: Normal rate.   Pulmonary:      Effort: Pulmonary effort is normal.   Abdominal:      General: Abdomen is flat.    Musculoskeletal:         General: Normal range of motion.      Cervical back: Normal range of motion.   Skin:     General: Skin is warm.   Neurological:      General: No focal deficit present.                Significant Labs: All pertinent labs within the past 24 hours have been reviewed.  BMP:   Recent Labs   Lab 07/03/25  1233   GLU 84   *   K 7.0*   CL 96   CO2 11*   *   CREATININE 9.8*   CALCIUM 8.8       Significant Imaging: I have reviewed all pertinent imaging results/findings within the past 24 hours.  Assessment/Plan:     Assessment & Plan  Hyperkalemia  Hyperkalemia is likely due to ESRD.The patients most recent potassium results are listed below.  Recent Labs     07/03/25  1233   K 7.0*     Plan  - Monitor for arrhythmias with EKG and/or continuous telemetry.   - Treat the hyperkalemia with Potassium Binders.   - Monitor potassium: Every 12 hours  - The patient's hyperkalemia is stable  Will get                             urgent HD in ER  Nephrology consulted          S/P CABG x 1  stable    Anemia due to chronic kidney disease, on chronic dialysis  Anemia is likely due to chronic disease due to ESRD. Most recent hemoglobin and hematocrit are listed below.  Recent Labs     07/03/25  1233 07/03/25  1606   HGB 11.4*  --    HCT 36.9* 29*     Plan  - Monitor serial CBC: Daily  - Transfuse PRBC if patient becomes hemodynamically unstable, symptomatic or H/H drops below 7/21.  - Patient has not received any PRBC transfusions to date  - Patient's anemia is currently stable    Paroxysmal atrial fibrillation  Patient has persistent (7 days or more) atrial fibrillation. Patient is currently in sinus rhythm. [id number] Score: 3. The patients heart rate in the last 24 hours is as follows:  Pulse  Min: 90  Max: 98     Antiarrhythmics       Anticoagulants  heparin (porcine) injection 1,000 Units, As needed (PRN), Intra-Catheter  apixaban tablet 2.5 mg, 2 times daily, Oral    Plan  - Replete lytes with a  goal of K>4, Mg >2  - Patient is anticoagulated, see medications listed above.  - Patient's afib is currently controlled      Encephalopathy  Uncertain etiology  Will check UA, PD fluid  CT of head pending    ESRD (end stage renal disease)  Creatine stable for now. BMP reviewed- noted Estimated Creatinine Clearance: 5.6 mL/min (A) (based on SCr of 9.8 mg/dL (H)). according to latest data. Based on current GFR, CKD stage is end stage.  Monitor UOP and serial BMP and adjust therapy as needed. Renally dose meds. Avoid nephrotoxic medications and procedures.  VTE Risk Mitigation (From admission, onward)           Ordered     apixaban tablet 2.5 mg  2 times daily         07/03/25 1625     IP VTE HIGH RISK PATIENT  Once         07/03/25 1620     Place sequential compression device  Until discontinued         07/03/25 1620     heparin (porcine) injection 1,000 Units  As needed (PRN)         07/03/25 1500                                   On 07/03/2025, patient should be placed in hospital observation services under my care.             Randell Garcia MD  Department of Hospital Medicine  [facility] - Emergency Dept

## 2025-07-03 NOTE — SUBJECTIVE & OBJECTIVE
Past Medical History:   Diagnosis Date    Anxiety     Chronic pain syndrome     CKD (chronic kidney disease), stage III     CVD (cardiovascular disease)     Depression     Diabetes mellitus, type 2     GERD (gastroesophageal reflux disease)     Hyperemesis 03/23/2021    Hypokalemia 03/23/2021    Infection of below knee amputation stump 03/12/2022    Osteomyelitis     Osteomyelitis of left foot 04/30/2021    Ulcer of left foot     Vaginal delivery     x1       Past Surgical History:   Procedure Laterality Date    ABOVE-KNEE AMPUTATION Left 5/18/2021    Procedure: AMPUTATION, ABOVE KNEE;  Surgeon: Teddy Huber MD;  Location: [address];  Service: Vascular;  Laterality: Left;    ABOVE-KNEE AMPUTATION Right 3/18/2022    Procedure: AMPUTATION, ABOVE KNEE;  Surgeon: DAYNE Florez II, MD;  Location: [address];  Service: Vascular;  Laterality: Right;    Angiogram - Right Extremity Right 7/9/15    ANGIOGRAM, EXTREMITY, UNILATERAL Left 11/22/2024    Procedure: ANGIOGRAM, EXTREMITY, UNILATERAL;  Surgeon: Yony Lindsey MD;  Location: [address];  Service: Vascular;  Laterality: Left;  Left leg angio, L CFA approach  5.4 min  597.31 mGy  73.8959 Gycm2  20 ml dye    angiogram-left leg  10/6/15    ANGIOGRAPHY OF LOWER EXTREMITY Left 4/29/2021    Procedure: ANGIOGRAM, LOWER EXTREMITY;  Surgeon: Teddy Huber MD;  Location: [address];  Service: Vascular;  Laterality: Left;    BELOW KNEE AMPUTATION OF LOWER EXTREMITY Right 12/28/2021    Procedure: AMPUTATION, BELOW KNEE;  Surgeon: Kaitlyn Rojas MD;  Location: [facility] OR;  Service: General;  Laterality: Right;    CATHETERIZATION OF BOTH LEFT AND RIGHT HEART N/A 12/18/2019    Procedure: CATHETERIZATION, HEART, BOTH LEFT AND RIGHT;  Surgeon: Que Fernando III, MD;  Location: [facility] CATH LAB;  Service: Cardiology;  Laterality: N/A;    CORONARY ANGIOGRAPHY N/A 12/18/2019    Procedure: ANGIOGRAM, CORONARY ARTERY;  Surgeon:  Que Fernando III, MD;  Location: [facility] CATH LAB;  Service: Cardiology;  Laterality: N/A;    CORONARY ANGIOGRAPHY INCLUDING BYPASS GRAFTS WITH CATHETERIZATION OF LEFT HEART N/A 7/28/2020    Procedure: ANGIOGRAM, CORONARY, INCLUDING BYPASS GRAFT, WITH LEFT HEART CATHETERIZATION, 9 am;  Surgeon: Rachel Easley MD;  Location: [facility] CATH LAB;  Service: Cardiology;  Laterality: N/A;    CORONARY ARTERY BYPASS GRAFT (CABG) N/A 1/14/2020    Procedure: CORONARY ARTERY BYPASS GRAFT (CABG) x 1     Off Pump;  Surgeon: Huang Altamirano MD;  Location: [facility] OR [address] [place] [address];  Service: Cardiovascular;  Laterality: N/A;    CREATION OF FEMORAL-TIBIAL ARTERY BYPASS Left 4/29/2021    Procedure: CREATION, BYPASS, ARTERIAL, FEMORAL TO ANTERIOR TIBIAL;  Surgeon: Teddy Huber MD;  Location: [facility] OR [address] [place] [address];  Service: Vascular;  Laterality: Left;    CREATION OF FEMOROPOPLITEAL ARTERIAL BYPASS USING GRAFT Left 8/18/2020    Procedure: CREATION, BYPASS, ARTERIAL, FEMORAL TO POPLITEAL, USING GRAFT, LEFT LOWER EXTREMITY;  Surgeon: Teddy Huber MD;  Location: [facility];  Service: Vascular;  Laterality: Left;  REQUEST 7:15 A.M. START----COVID NEGATIVE ON 8/17 1ST CASE STARTKENYA DUEÑAS ON 8/7/2020 @ 942AM-LO  RN PREOP 8/12/2020   T/S-----CLEARED BY CARDS-------PENDING INSURANCE    DEBRIDEMENT OF FOOT Left 9/8/2020    Procedure: DEBRIDEMENT, FOOT;  Surgeon: Rosio Mayes DPM;  Location: [facility] OR;  Service: Podiatry;  Laterality: Left;  request neoxx .   RN Pre Op 9-4-2020, Covid negative on 9/5/20. C A    DEBRIDEMENT OF FOOT  3/4/2021    Procedure: DEBRIDEMENT, FOOT;  Surgeon: Teddy Huber MD;  Location: [facility] OR;  Service: Vascular;;    DEBRIDEMENT OF FOOT Left 3/9/2021    Procedure: DEBRIDEMENT, FOOT, bone biopsy;  Surgeon: Rosio Mayes DPM;  Location: [facility] OR;  Service: Podiatry;  Laterality: Left;  Request neoxx---COVID IN AM  REQUESTING NOON START  RN Phone Pre op.On Blood thinners Plavix and Eliquis.  Covid am of  surgery. C A    DEBRIDEMENT OF FOOT Left 5/4/2021    Procedure: DEBRIDEMENT, FOOT;  Surgeon: Farooq Morley DPM;  Location: [facility] OR [facility] FLR;  Service: Podiatry;  Laterality: Left;    ESOPHAGOGASTRODUODENOSCOPY N/A 11/7/2024    Procedure: EGD (ESOPHAGOGASTRODUODENOSCOPY);  Surgeon: Yony Cancino MD;  Location: [facility] ENDO;  Service: Endoscopy;  Laterality: N/A;    INSERTION OF TUNNELED CENTRAL VENOUS HEMODIALYSIS CATHETER N/A 1/27/2020    Procedure: Insertion, Catheter, Central Venous, Hemodialysis;  Surgeon: ESTEBAN Gomez III, MD;  Location: [facility] CATH LAB;  Service: Peripheral Vascular;  Laterality: N/A;    INSERTION OF TUNNELED CENTRAL VENOUS HEMODIALYSIS CATHETER  5/10/2023    Procedure: Insertion, Catheter, Central Venous, Hemodialysis;  Surgeon: Romulo Queen MD;  Location: [facility] CATH LAB;  Service: Cardiology;;    INSERTION, CATHETER, DIALYSIS, PERITONEAL, LAPAROSCOPIC N/A 6/3/2025    Procedure: INSERTION, CATHETER, DIALYSIS, PERITONEAL, LAPAROSCOPIC;  Surgeon: Kvng Tai MD;  Location: [facility] OR;  Service: General;  Laterality: N/A;    IRRIGATION AND DEBRIDEMENT OF LOWER EXTREMITY Left 11/22/2024    Procedure: IRRIGATION AND DEBRIDEMENT, LOWER EXTREMITY;  Surgeon: Yony Lindsey MD;  [address]  Service: Vascular;  Laterality: Left;    MAGNETIC RESONANCE IMAGING Left 11/19/2024    Procedure: MRI (Magnetic Resonance Imagine);  Surgeon: Sophia Hernandez;  Location: [facility];  Service: Anesthesiology;  Laterality: Left;    PERCUTANEOUS TRANSLUMINAL ANGIOPLASTY N/A 3/4/2021    Procedure: PTA (ANGIOPLASTY, PERCUTANEOUS, TRANSLUMINAL);  Surgeon: Teddy Huber MD;  Location: [facility] OR;  Service: Vascular;  Laterality: N/A;    REMOVAL OF ARTERIOVENOUS GRAFT Left 5/27/2021    Procedure: REMOVAL, GRAFT, LEFT LOWER EXTREMITY, WOUND EXPLORATION;  Surgeon: Teddy Huber MD;  [address];  Service: Vascular;  Laterality: Left;    REMOVAL OF NAIL  OF DIGIT Left 3/9/2021    Procedure: REMOVAL, NAIL, DIGIT;  Surgeon: Rosio Mayes DPM;  Location: [facility] OR;  Service: Podiatry;  Laterality: Left;    RIGHT HEART CATHETERIZATION Right 5/10/2023    Procedure: INSERTION, CATHETER, RIGHT HEART;  Surgeon: Romulo Queen MD;  Location: [facility] CATH LAB;  Service: Cardiology;  Laterality: Right;    STENT, ILIAC ARTERY Left 11/22/2024    Procedure: STENT, ILIAC ARTERY;  Surgeon: Yony Lindsey MD;  Location: [facility] OR [facility] FLR;  Service: Vascular;  Laterality: Left;    THROMBECTOMY Left 3/4/2021    Procedure: THROMBECTOMY, LEFT LOWER EXTREMITY BYPASS GRAFT, ANGIOGRAM, POSSIBLE INTERVENTION, POSSIBLE LEFT LOWER EXTREMITY BYPASS;  Surgeon: Teddy Huber MD;  Location: [facility] OR;  Service: Vascular;  Laterality: Left;    THROMBECTOMY Left 4/29/2021    Procedure: GRAFT THROMBECTOMY, LEFT LOWER EXTREMITY;  Surgeon: Teddy Huber MD;  [facility];  Service: Vascular;  Laterality: Left;  14.5 min  1179.85 mGy  341.01 Gycm2  240 ml dye    THROMBECTOMY  10/22/2021    Procedure: THROMBECTOMY;  Surgeon: Saad Arenas MD;  Location: [facility] CATH LAB/EP;  Service: Cardiology;;       Review of patient's allergies indicates:   Allergen Reactions    Ciprofloxacin Itching    Pcn [penicillins]      Rash; tolerated ceftriaxone on 1/13/20  Tolerating Augmentin November 2024     Current Facility-Administered Medications   Medication Frequency    calcium gluconate 1 g in NS IVPB (premixed) ED 1 Time    dextrose 50% injection 25 g ED 1 Time    insulin regular injection 5 Units 0.05 mL ED 1 Time    sodium zirconium cyclosilicate packet 10 g ED 1 Time     Current Outpatient Medications   Medication    acetaminophen (TYLENOL) 325 MG tablet    allopurinoL (ZYLOPRIM) 100 MG tablet    apixaban (ELIQUIS) 2.5 mg Tab    atorvastatin (LIPITOR) 80 MG tablet    blood sugar diagnostic Strp    blood-glucose meter Misc    blood-glucose meter Misc    blood-glucose sensor  "(DEXCOM G7 SENSOR) Radha    calcitRIOL (ROCALTROL) 0.5 MCG Cap    calcium acetate,phosphat bind, (PHOSLO) 667 mg tablet    carvediloL (COREG) 6.25 MG tablet    cinacalcet (SENSIPAR) 30 MG Tab    clopidogreL (PLAVIX) 75 mg tablet    famotidine (PEPCID) 20 MG tablet    furosemide (LASIX) 40 MG tablet    hydrALAZINE (APRESOLINE) 10 MG tablet    HYDROcodone-acetaminophen (NORCO) 5-325 mg per tablet    insulin aspart U-100 (NOVOLOG FLEXPEN U-100 INSULIN) 100 unit/mL (3 mL) InPn pen    insulin aspart U-100 (NOVOLOG) 100 unit/mL (3 mL) InPn pen    insulin glargine U-100, Lantus, 100 unit/mL (3 mL) SubQ InPn pen    isosorbide dinitrate (ISORDIL) 10 MG tablet    lancets 33 gauge Misc    multivit with min-folic acid (ONE DAILY WOMENS 50 PLUS) 0.4 mg Tab    mupirocin (BACTROBAN) 2 % ointment    OZEMPIC 0.25 mg or 0.5 mg (2 mg/3 mL) pen injector    pen needle, diabetic (BD MIGUEL 2ND GEN PEN NEEDLE) 32 gauge x 5/32" Ndle    pen needle, diabetic 33 gauge x 5/32" Ndle    pregabalin (LYRICA) 75 MG capsule    RENVELA 2.4 gram PwPk    sacubitriL-valsartan (ENTRESTO) 24-26 mg per tablet    senna-docusate (PERICOLACE) 8.6-50 mg per tablet    sertraline (ZOLOFT) 100 MG tablet    sodium bicarbonate 650 MG tablet    traZODone (DESYREL) 50 MG tablet    zinc oxide 10 % Oint     Family History       Problem Relation (Age of Onset)    Diabetes Mother, Father, Paternal Grandmother    Heart disease Maternal Grandmother    No Known Problems Maternal Grandfather, Paternal Grandfather          Tobacco Use    Smoking status: Former     Passive exposure: Never    Smokeless tobacco: Never   Substance and Sexual Activity    Alcohol use: No    Drug use: Yes     Types: Marijuana     Comment: occassional    Sexual activity: Yes     Partners: Male     Review of Systems  Objective:     Vital Signs (Most Recent):  Temp: 98.3 °F (36.8 °C) (07/03/25 1127)  Pulse: 92 (07/03/25 1456)  Resp: 18 (07/03/25 1456)  BP: (!) 139/57 (07/03/25 1400)  SpO2: (!) 94 % " (07/03/25 1456) Vital Signs (24h Range):  Temp:  [98.3 °F (36.8 °C)] 98.3 °F (36.8 °C)  Pulse:  [90-93] 92  Resp:  [12-19] 18  SpO2:  [91 %-97 %] 94 %  BP: (132-139)/(57-81) 139/57     Weight: 59 kg (130 lb) (07/03/25 1127)  Body mass index is 21.63 kg/m².  Body surface area is 1.64 meters squared.    No intake/output data recorded.     Physical Exam  Vitals and nursing note reviewed.   Constitutional:       General: She is not in acute distress.     Appearance: Normal appearance. She is ill-appearing.   Eyes:      General: No scleral icterus.  Cardiovascular:      Rate and Rhythm: Normal rate and regular rhythm.      Pulses: Normal pulses.   Pulmonary:      Effort: Pulmonary effort is normal. No respiratory distress.   Abdominal:      General: There is no distension.      Palpations: Abdomen is soft.      Tenderness: Tenderness: mild TTP of LLQ.   Musculoskeletal:         General: Deformity: bilateral AKA. Normal range of motion.   Skin:     General: Skin is warm and dry.      Findings: No erythema.   Neurological:      General: No focal deficit present.      Mental Status: Mental status is at baseline.   Psychiatric:         Mood and Affect: Mood normal.          Significant Labs:  All labs within the past 24 hours have been reviewed.    Significant Imaging:  Labs: Reviewed  ECG: Reviewed

## 2025-07-03 NOTE — ASSESSMENT & PLAN NOTE
>>ASSESSMENT AND PLAN FOR S/P CABG X 1 WRITTEN ON 7/3/2025  4:34 PM BY SUHAS ALVAREZ MD    stable

## 2025-07-03 NOTE — ASSESSMENT & PLAN NOTE
Patient has persistent (7 days or more) atrial fibrillation. Patient is currently in sinus rhythm. [id number] Score: 3. The patients heart rate in the last 24 hours is as follows:  Pulse  Min: 90  Max: 98     Antiarrhythmics       Anticoagulants  heparin (porcine) injection 1,000 Units, As needed (PRN), Intra-Catheter  apixaban tablet 2.5 mg, 2 times daily, Oral    Plan  - Replete lytes with a goal of K>4, Mg >2  - Patient is anticoagulated, see medications listed above.  - Patient's afib is currently controlled

## 2025-07-03 NOTE — PROVIDER PROGRESS NOTES - EMERGENCY DEPT.
Encounter Date: 7/3/2025    ED Physician Progress Notes        Physician Note:   Assumed care of patient from Dr. Stringer at .   Patient ordered for shifting medication for hyperkalemia and Nephrology consulted, able to perform HD in the ED.   CTH was pending at time of admission to Hospital Medicine (dialysis started prior to obtaining).   Once CTH resulted, I informed HM team of abnormal findings for further testing/management. UA pending.

## 2025-07-03 NOTE — SUBJECTIVE & OBJECTIVE
Past Medical History:   Diagnosis Date    Anxiety     Chronic pain syndrome     CKD (chronic kidney disease), stage III     CVD (cardiovascular disease)     Depression     Diabetes mellitus, type 2     GERD (gastroesophageal reflux disease)     Hyperemesis 03/23/2021    Hypokalemia 03/23/2021    Infection of below knee amputation stump 03/12/2022    Osteomyelitis     Osteomyelitis of left foot 04/30/2021    Ulcer of left foot     Vaginal delivery     x1       Past Surgical History:   Procedure Laterality Date    ABOVE-KNEE AMPUTATION Left 5/18/2021    Procedure: AMPUTATION, ABOVE KNEE;  Surgeon: Teddy Huber MD;  Location: [address];  Service: Vascular;  Laterality: Left;    ABOVE-KNEE AMPUTATION Right 3/18/2022    Procedure: AMPUTATION, ABOVE KNEE;  Surgeon: DAYNE Florez II, MD;  Location: [address];  Service: Vascular;  Laterality: Right;    Angiogram - Right Extremity Right 7/9/15    ANGIOGRAM, EXTREMITY, UNILATERAL Left 11/22/2024    Procedure: ANGIOGRAM, EXTREMITY, UNILATERAL;  Surgeon: Yony Lindsey MD;  Location: [address];  Service: Vascular;  Laterality: Left;  Left leg angio, L CFA approach  5.4 min  597.31 mGy  73.8959 Gycm2  20 ml dye    angiogram-left leg  10/6/15    ANGIOGRAPHY OF LOWER EXTREMITY Left 4/29/2021    Procedure: ANGIOGRAM, LOWER EXTREMITY;  Surgeon: Teddy Huber MD;  Location: [address];  Service: Vascular;  Laterality: Left;    BELOW KNEE AMPUTATION OF LOWER EXTREMITY Right 12/28/2021    Procedure: AMPUTATION, BELOW KNEE;  Surgeon: Kaitlyn Rojas MD;  Location: [facility] OR;  Service: General;  Laterality: Right;    CATHETERIZATION OF BOTH LEFT AND RIGHT HEART N/A 12/18/2019    Procedure: CATHETERIZATION, HEART, BOTH LEFT AND RIGHT;  Surgeon: Que Fernando III, MD;  Location: [facility] CATH LAB;  Service: Cardiology;  Laterality: N/A;    CORONARY ANGIOGRAPHY N/A 12/18/2019    Procedure: ANGIOGRAM, CORONARY ARTERY;  Surgeon:  Que Fernando III, MD;  Location: [facility] CATH LAB;  Service: Cardiology;  Laterality: N/A;    CORONARY ANGIOGRAPHY INCLUDING BYPASS GRAFTS WITH CATHETERIZATION OF LEFT HEART N/A 7/28/2020    Procedure: ANGIOGRAM, CORONARY, INCLUDING BYPASS GRAFT, WITH LEFT HEART CATHETERIZATION, 9 am;  Surgeon: Rachel Easley MD;  Location: [facility] CATH LAB;  Service: Cardiology;  Laterality: N/A;    CORONARY ARTERY BYPASS GRAFT (CABG) N/A 1/14/2020    Procedure: CORONARY ARTERY BYPASS GRAFT (CABG) x 1     Off Pump;  Surgeon: Huang Altamirano MD;  Location: [facility] OR [address] [place] [address];  Service: Cardiovascular;  Laterality: N/A;    CREATION OF FEMORAL-TIBIAL ARTERY BYPASS Left 4/29/2021    Procedure: CREATION, BYPASS, ARTERIAL, FEMORAL TO ANTERIOR TIBIAL;  Surgeon: Teddy Huber MD;  Location: [facility] OR [address] [place] [address];  Service: Vascular;  Laterality: Left;    CREATION OF FEMOROPOPLITEAL ARTERIAL BYPASS USING GRAFT Left 8/18/2020    Procedure: CREATION, BYPASS, ARTERIAL, FEMORAL TO POPLITEAL, USING GRAFT, LEFT LOWER EXTREMITY;  Surgeon: Teddy Huber MD;  Location: [facility];  Service: Vascular;  Laterality: Left;  REQUEST 7:15 A.M. START----COVID NEGATIVE ON 8/17 1ST CASE STARTKENYA DUEÑAS ON 8/7/2020 @ 942AM-LO  RN PREOP 8/12/2020   T/S-----CLEARED BY CARDS-------PENDING INSURANCE    DEBRIDEMENT OF FOOT Left 9/8/2020    Procedure: DEBRIDEMENT, FOOT;  Surgeon: Rosio Mayes DPM;  Location: [facility] OR;  Service: Podiatry;  Laterality: Left;  request neoxx .   RN Pre Op 9-4-2020, Covid negative on 9/5/20. C A    DEBRIDEMENT OF FOOT  3/4/2021    Procedure: DEBRIDEMENT, FOOT;  Surgeon: Teddy Huber MD;  Location: [facility] OR;  Service: Vascular;;    DEBRIDEMENT OF FOOT Left 3/9/2021    Procedure: DEBRIDEMENT, FOOT, bone biopsy;  Surgeon: Rosio Mayes DPM;  Location: [facility] OR;  Service: Podiatry;  Laterality: Left;  Request neoxx---COVID IN AM  REQUESTING NOON START  RN Phone Pre op.On Blood thinners Plavix and Eliquis.  Covid am of  surgery. C A    DEBRIDEMENT OF FOOT Left 5/4/2021    Procedure: DEBRIDEMENT, FOOT;  Surgeon: Farooq Morley DPM;  Location: [facility] OR [facility] FLR;  Service: Podiatry;  Laterality: Left;    ESOPHAGOGASTRODUODENOSCOPY N/A 11/7/2024    Procedure: EGD (ESOPHAGOGASTRODUODENOSCOPY);  Surgeon: Yony Cancino MD;  Location: [facility] ENDO;  Service: Endoscopy;  Laterality: N/A;    INSERTION OF TUNNELED CENTRAL VENOUS HEMODIALYSIS CATHETER N/A 1/27/2020    Procedure: Insertion, Catheter, Central Venous, Hemodialysis;  Surgeon: ESTEBAN Gomez III, MD;  Location: [facility] CATH LAB;  Service: Peripheral Vascular;  Laterality: N/A;    INSERTION OF TUNNELED CENTRAL VENOUS HEMODIALYSIS CATHETER  5/10/2023    Procedure: Insertion, Catheter, Central Venous, Hemodialysis;  Surgeon: Romulo Queen MD;  Location: [facility] CATH LAB;  Service: Cardiology;;    INSERTION, CATHETER, DIALYSIS, PERITONEAL, LAPAROSCOPIC N/A 6/3/2025    Procedure: INSERTION, CATHETER, DIALYSIS, PERITONEAL, LAPAROSCOPIC;  Surgeon: Kvng Tai MD;  Location: [facility] OR;  Service: General;  Laterality: N/A;    IRRIGATION AND DEBRIDEMENT OF LOWER EXTREMITY Left 11/22/2024    Procedure: IRRIGATION AND DEBRIDEMENT, LOWER EXTREMITY;  Surgeon: Yony Lindsey MD;  [address];  Service: Vascular;  Laterality: Left;    MAGNETIC RESONANCE IMAGING Left 11/19/2024    Procedure: MRI (Magnetic Resonance Imagine);  Surgeon: Sophia Hernandez;  Location: [facility];  Service: Anesthesiology;  Laterality: Left;    PERCUTANEOUS TRANSLUMINAL ANGIOPLASTY N/A 3/4/2021    Procedure: PTA (ANGIOPLASTY, PERCUTANEOUS, TRANSLUMINAL);  Surgeon: Teddy Huber MD;  Location: [facility] OR;  Service: Vascular;  Laterality: N/A;    REMOVAL OF ARTERIOVENOUS GRAFT Left 5/27/2021    Procedure: REMOVAL, GRAFT, LEFT LOWER EXTREMITY, WOUND EXPLORATION;  Surgeon: Teddy Huber MD;  [address];  Service: Vascular;  Laterality: Left;    REMOVAL OF NAIL  OF DIGIT Left 3/9/2021    Procedure: REMOVAL, NAIL, DIGIT;  Surgeon: Rosio Mayes DPM;  Location: [facility] OR;  Service: Podiatry;  Laterality: Left;    RIGHT HEART CATHETERIZATION Right 5/10/2023    Procedure: INSERTION, CATHETER, RIGHT HEART;  Surgeon: Romulo Queen MD;  Location: [facility] CATH LAB;  Service: Cardiology;  Laterality: Right;    STENT, ILIAC ARTERY Left 11/22/2024    Procedure: STENT, ILIAC ARTERY;  Surgeon: Yony Lindsey MD;  Location: [facility] OR [facility] FLR;  Service: Vascular;  Laterality: Left;    THROMBECTOMY Left 3/4/2021    Procedure: THROMBECTOMY, LEFT LOWER EXTREMITY BYPASS GRAFT, ANGIOGRAM, POSSIBLE INTERVENTION, POSSIBLE LEFT LOWER EXTREMITY BYPASS;  Surgeon: Teddy Huber MD;  Location: [facility] OR;  Service: Vascular;  Laterality: Left;    THROMBECTOMY Left 4/29/2021    Procedure: GRAFT THROMBECTOMY, LEFT LOWER EXTREMITY;  Surgeon: Teddy Huber MD;  [facility];  Service: Vascular;  Laterality: Left;  14.5 min  1179.85 mGy  341.01 Gycm2  240 ml dye    THROMBECTOMY  10/22/2021    Procedure: THROMBECTOMY;  Surgeon: Saad Arenas MD;  Location: [facility] CATH LAB/EP;  Service: Cardiology;;       Review of patient's allergies indicates:   Allergen Reactions    Ciprofloxacin Itching    Pcn [penicillins]      Rash; tolerated ceftriaxone on 1/13/20  Tolerating Augmentin November 2024       No current facility-administered medications on file prior to encounter.     Current Outpatient Medications on File Prior to Encounter   Medication Sig    acetaminophen (TYLENOL) 325 MG tablet Take 2 tablets (650 mg total) by mouth every 8 (eight) hours as needed for Pain.    allopurinoL (ZYLOPRIM) 100 MG tablet TAKE 1/2 TABLET(50 MG) BY MOUTH EVERY OTHER DAY    apixaban (ELIQUIS) 2.5 mg Tab Take 1 tablet (2.5 mg total) by mouth 2 (two) times daily.    atorvastatin (LIPITOR) 80 MG tablet Take 1 tablet (80 mg total) by mouth once daily.    blood sugar diagnostic Strp 1 each by  Misc.(Non-Drug; Combo Route) route 4 (four) times daily before meals and nightly.    blood-glucose meter Misc Before meals and at bedtime    blood-glucose meter Misc 1 each by Misc.(Non-Drug; Combo Route) route 3 (three) times daily.    blood-glucose sensor (DEXCOM G7 SENSOR) Radha Apply device and replace every 10 days    calcitRIOL (ROCALTROL) 0.5 MCG Cap Take 1 capsule (0.5 mcg total) by mouth once daily.    calcium acetate,phosphat bind, (PHOSLO) 667 mg tablet Take 1 tablet (667 mg total) by mouth 2 (two) times a day.    carvediloL (COREG) 6.25 MG tablet Take 6.25 mg by mouth 2 (two) times daily.    cinacalcet (SENSIPAR) 30 MG Tab Take 1 tablet (30 mg total) by mouth every Mon, Wed, Fri.    clopidogreL (PLAVIX) 75 mg tablet Take 1 tablet (75 mg total) by mouth once daily.    famotidine (PEPCID) 20 MG tablet TAKE 1 TABLET(20 MG) BY MOUTH TWICE DAILY    furosemide (LASIX) 40 MG tablet Take 1 tablet (40 mg total) by mouth 2 (two) times daily.    hydrALAZINE (APRESOLINE) 10 MG tablet Take 1 tablet (10 mg total) by mouth every 12 (twelve) hours.    HYDROcodone-acetaminophen (NORCO) 5-325 mg per tablet Take 1 tablet by mouth every 6 (six) hours as needed for Pain.    insulin aspart U-100 (NOVOLOG FLEXPEN U-100 INSULIN) 100 unit/mL (3 mL) InPn pen Inject 3 Units into the skin 3 (three) times daily with meals.    insulin aspart U-100 (NOVOLOG) 100 unit/mL (3 mL) InPn pen Inject 5 Units into the skin 3 times daily with meals. **MODERATE CORRECTION DOSE**  151-200 mg/dL Pre-meal: 2 units 2200: 1 unit; 201-250 mg/dL Pre-meal 4 units 2200: 2 units; 251-300mg/dL Pre-meal 6 units 2200: 3 units; 301-350mg/dL Pre-meal 8 units 2200: 4 units; >350mg/dL Pre-meal 10 U 2200: 5 U    insulin glargine U-100, Lantus, 100 unit/mL (3 mL) SubQ InPn pen Inject 5 Units into the skin every evening.    isosorbide dinitrate (ISORDIL) 10 MG tablet Take 0.5 tablets (5 mg total) by mouth 2 (two) times daily.    lancets 33 gauge Misc TEST 3 TIMES  "DAILY    multivit with min-folic acid (ONE DAILY WOMENS 50 PLUS) 0.4 mg Tab Take 1 tablet by mouth once daily.    mupirocin (BACTROBAN) 2 % ointment Apply topically 2 (two) times daily.    OZEMPIC 0.25 mg or 0.5 mg (2 mg/3 mL) pen injector Inject 0.5 mg into the skin every 7 days.    pen needle, diabetic (BD MIGUEL 2ND GEN PEN NEEDLE) 32 gauge x 5/32" Ndle Use to inject insulin once daily    pen needle, diabetic 33 gauge x 5/32" Ndle 1 each by Misc.(Non-Drug; Combo Route) route 4 (four) times daily before meals and nightly.    pregabalin (LYRICA) 75 MG capsule Take 1 capsule (75 mg total) by mouth Daily.    RENVELA 2.4 gram PwPk Take 1 packet by mouth 3 (three) times daily with meals.    sacubitriL-valsartan (ENTRESTO) 24-26 mg per tablet Take 1 tablet by mouth 2 (two) times daily.    senna-docusate (PERICOLACE) 8.6-50 mg per tablet Take 1 tablet by mouth 2 (two) times daily.    sertraline (ZOLOFT) 100 MG tablet Take 1 tablet (100 mg total) by mouth once daily.    sodium bicarbonate 650 MG tablet TAKE 1 TABLET(650 MG) BY MOUTH THREE TIMES DAILY    traZODone (DESYREL) 50 MG tablet TAKE 1/2 TABLET(25 MG) BY MOUTH EVERY EVENING    zinc oxide 10 % Oint Apply 1 Application topically 2 (two) times a day.    [DISCONTINUED] lancing device Misc 1 Device by Misc.(Non-Drug; Combo Route) route 2 (two) times daily with meals.     Family History       Problem Relation (Age of Onset)    Diabetes Mother, Father, Paternal Grandmother    Heart disease Maternal Grandmother    No Known Problems Maternal Grandfather, Paternal Grandfather          Tobacco Use    Smoking status: Former     Passive exposure: Never    Smokeless tobacco: Never   Substance and Sexual Activity    Alcohol use: No    Drug use: Yes     Types: Marijuana     Comment: occassional    Sexual activity: Yes     Partners: Male     Review of Systems   Constitutional:  Positive for activity change and appetite change.   HENT: Negative.     Eyes: Negative.    Respiratory: " Negative.     Cardiovascular: Negative.    Gastrointestinal: Negative.    Endocrine: Negative.    Genitourinary: Negative.    Allergic/Immunologic: Negative.    Neurological: Negative.    Hematological: Negative.    Psychiatric/Behavioral: Negative.       Objective:     Vital Signs (Most Recent):  Temp: 98.3 °F (36.8 °C) (07/03/25 1127)  Pulse: 97 (07/03/25 1612)  Resp: 12 (07/03/25 1612)  BP: 131/60 (07/03/25 1612)  SpO2: 96 % (07/03/25 1612) Vital Signs (24h Range):  Temp:  [98.3 °F (36.8 °C)] 98.3 °F (36.8 °C)  Pulse:  [90-98] 97  Resp:  [10-19] 12  SpO2:  [91 %-97 %] 96 %  BP: (130-140)/(55-81) 131/60     Weight: 59 kg (130 lb)  Body mass index is 21.63 kg/m².     Physical Exam  Constitutional:       General: She is in acute distress.      Appearance: She is ill-appearing.   HENT:      Head: Normocephalic.      Nose: Nose normal.      Mouth/Throat:      Mouth: Mucous membranes are moist.   Cardiovascular:      Rate and Rhythm: Normal rate.   Pulmonary:      Effort: Pulmonary effort is normal.   Abdominal:      General: Abdomen is flat.   Musculoskeletal:         General: Normal range of motion.      Cervical back: Normal range of motion.   Skin:     General: Skin is warm.   Neurological:      General: No focal deficit present.                Significant Labs: All pertinent labs within the past 24 hours have been reviewed.  BMP:   Recent Labs   Lab 07/03/25  1233   GLU 84   *   K 7.0*   CL 96   CO2 11*   *   CREATININE 9.8*   CALCIUM 8.8       Significant Imaging: I have reviewed all pertinent imaging results/findings within the past 24 hours.

## 2025-07-03 NOTE — ASSESSMENT & PLAN NOTE
Creatine stable for now. BMP reviewed- noted Estimated Creatinine Clearance: 5.6 mL/min (A) (based on SCr of 9.8 mg/dL (H)). according to latest data. Based on current GFR, CKD stage is end stage.  Monitor UOP and serial BMP and adjust therapy as needed. Renally dose meds. Avoid nephrotoxic medications and procedures.

## 2025-07-03 NOTE — ASSESSMENT & PLAN NOTE
K 7.0 on presentation with EKG changes. Last full HD session was 6/27.    -HD today  -Trend K levels

## 2025-07-03 NOTE — ASSESSMENT & PLAN NOTE
Anemia is likely due to chronic disease due to ESRD. Most recent hemoglobin and hematocrit are listed below.  Recent Labs     07/03/25  1233 07/03/25  1606   HGB 11.4*  --    HCT 36.9* 29*     Plan  - Monitor serial CBC: Daily  - Transfuse PRBC if patient becomes hemodynamically unstable, symptomatic or H/H drops below 7/21.  - Patient has not received any PRBC transfusions to date  - Patient's anemia is currently stable

## 2025-07-03 NOTE — ED NOTES
Nurse spoke with dialysis provider Adriel CRAFT, that the patient is currently being shifted for her potassium. Per Adriel, she is okay with her potassium being shifted before dialysis.

## 2025-07-03 NOTE — ASSESSMENT & PLAN NOTE
Hyperkalemia is likely due to ESRD.The patients most recent potassium results are listed below.  Recent Labs     07/03/25  1233   K 7.0*     Plan  - Monitor for arrhythmias with EKG and/or continuous telemetry.   - Treat the hyperkalemia with Potassium Binders.   - Monitor potassium: Every 12 hours  - The patient's hyperkalemia is stable  Will get                             urgent HD in ER  Nephrology consulted

## 2025-07-03 NOTE — HPI
60 yo F with ESRD, previously on thrice-weekly HD, now transitioning to home PD and currently receiving once weekly HD, last session 1 week ago, presenting to ED with dysuria, weakness, HA, N/V. K 7.0. EKG ordered, pending. Calcium, insulin, D50, albuterol, Lokelma ordered for medical management.      reports she is more somnolent, CT of head is pending.

## 2025-07-03 NOTE — ASSESSMENT & PLAN NOTE
Current hemoglobin >10. Continue to trend    -Defer EPO administration at this time but will continue to assess need with HD sessions

## 2025-07-03 NOTE — ED TRIAGE NOTES
Patient comes into the emergency department by POV with complaints of dysuria. Per family member, the patient went to the eye clinic for an eye infection but was told to come to the ED after telling the staff that she has been having pain with urination, vomiting x4 days, HA, disorientation, weakness and foul smelling urine. Pt has been training for at home PD. Was receiving HD.  Family member states her urine has been dark brown.

## 2025-07-03 NOTE — HPI
Patient is a 59-year-old female with past medical history as stated below significant for ESRD secondary to hypertensive disease previously on HD MWF schedule via tunneled IJ cath currently transitioning to home PD, type II diabetes mellitus, hypertension, hyperlipidemia, severe PAD s/p bilateral AKA c/b poor healing, L common iliac stent, CAD s/p CABG, HFrEF, obesity, pAF, depression, CAROLIN, mod-sev TR who presented to ED for lethargy, nausea/vomiting, dysuria and abnormal urine appearance. She is accompanied by her  who provides the majority of the history as patient appears to be somnolent during exam.   endorses that patient has been having nausea/vomiting and lethargy for the past few days but the dysuria started today. They are currently training for peritoneal dialysis at home. She had the peritoneal dialysis catheter placed 2-3 weeks ago and has been training over the past week. Her last full hemodialysis session was approximately one week ago. She went to her ophthalmologist today with eye pain and blurry vision; she was diagnosed with blepheritis. However, was recommended to present to ED for further evaluation of her lethargy and urinary symptoms.  ER Course: Afebrile and otherwise hemodynamically stable on room air. CBC with normal wbc, stable anemia with hemoglobin 11, normal platelets. CMP notable for hyponatremia 129, hyperkalemia 7.0, metabolic acidosis with bicarb 11, BUN/sCr 113/9.8.  EKG personally reviewed with T wave changes most significant in leads V2 and V3. Nephrology consulted for hyperkalemia and ESRD management.

## 2025-07-03 NOTE — ED PROVIDER NOTES
Emergency Department Encounter  Provider Note    Suyapa Connelly  [id number]  7/3/2025    Evaluation:    History Acquisition:     Chief Complaint   Patient presents with    Dysuria     Pt endorses dysuria x 3 days, pt wheelchair bone.        History of Present Illness:  Suyapa Connelly is a 59 y.o. female who has a past medical history of Anxiety, Chronic pain syndrome, CKD (chronic kidney disease), stage III, CVD (cardiovascular disease), Depression, Diabetes mellitus, type 2, GERD (gastroesophageal reflux disease), Hyperemesis (03/23/2021), Hypokalemia (03/23/2021), Infection of below knee amputation stump (03/12/2022), Osteomyelitis, Osteomyelitis of left foot (04/30/2021), Ulcer of left foot, and Vaginal delivery.    The patient presents to the ED due to generalized weakness, dysuria, and dark/malodorous urine.   Patient presents with spouse, who provides majority of history.    Additional historians utilized:  Spouse at bedside - states patient was previously on HD but a few weeks ago began transitioning to home PD. She has still been receiving weekly HD. Her last HD session was last week on Friday. He has noticed generalized weakness, stating she is not like herself and more lethargic than normal. He has also noticed dark, malodorous urine.     Prior medical records were reviewed:   Ophtho visit earlier today for R eye pain, blepharitis  Visit 6/30 for ESRD, eye pain  Admitted 06/2025 for NSTEMI    The patient's list of active medical history, family/social history, medications, and allergies as documented has been reviewed.     Past Medical History:   Diagnosis Date    Anxiety     Chronic pain syndrome     CKD (chronic kidney disease), stage III     CVD (cardiovascular disease)     Depression     Diabetes mellitus, type 2     GERD (gastroesophageal reflux disease)     Hyperemesis 03/23/2021    Hypokalemia 03/23/2021    Infection of below knee amputation stump 03/12/2022    Osteomyelitis     Osteomyelitis of  left foot 04/30/2021    Ulcer of left foot     Vaginal delivery     x1     Past Surgical History:   Procedure Laterality Date    ABOVE-KNEE AMPUTATION Left 5/18/2021    Procedure: AMPUTATION, ABOVE KNEE;  Surgeon: Teddy Huber MD;  Location: [facility] OR [address] [place] [address];  Service: Vascular;  Laterality: Left;    ABOVE-KNEE AMPUTATION Right 3/18/2022    Procedure: AMPUTATION, ABOVE KNEE;  Surgeon: DAYNE Florez II, MD;  Location: [facility] OR [address] [place] [address];  Service: Vascular;  Laterality: Right;    Angiogram - Right Extremity Right 7/9/15    ANGIOGRAM, EXTREMITY, UNILATERAL Left 11/22/2024    Procedure: ANGIOGRAM, EXTREMITY, UNILATERAL;  Surgeon: Yony Lindsey MD;  Location: [facility] OR [address] [place] [address];  Service: Vascular;  Laterality: Left;  Left leg angio, L CFA approach  5.4 min  597.31 mGy  73.8959 Gycm2  20 ml dye    angiogram-left leg  10/6/15    ANGIOGRAPHY OF LOWER EXTREMITY Left 4/29/2021    Procedure: ANGIOGRAM, LOWER EXTREMITY;  Surgeon: Teddy Huber MD;  Location: [facility] OR [address] [place] [address];  Service: Vascular;  Laterality: Left;    BELOW KNEE AMPUTATION OF LOWER EXTREMITY Right 12/28/2021    Procedure: AMPUTATION, BELOW KNEE;  Surgeon: Kaitlyn Rojas MD;  Location: [facility];  Service: General;  Laterality: Right;    CATHETERIZATION OF BOTH LEFT AND RIGHT HEART N/A 12/18/2019    Procedure: CATHETERIZATION, HEART, BOTH LEFT AND RIGHT;  Surgeon: Que Fernando III, MD;  Location: [facility] CATH LAB;  Service: Cardiology;  Laterality: N/A;    CORONARY ANGIOGRAPHY N/A 12/18/2019    Procedure: ANGIOGRAM, CORONARY ARTERY;  Surgeon: Que Fernando III, MD;  Location: [facility] CATH LAB;  Service: Cardiology;  Laterality: N/A;    CORONARY ANGIOGRAPHY INCLUDING BYPASS GRAFTS WITH CATHETERIZATION OF LEFT HEART N/A 7/28/2020    Procedure: ANGIOGRAM, CORONARY, INCLUDING BYPASS GRAFT, WITH LEFT HEART CATHETERIZATION, 9 am;  Surgeon: Rachel Easley MD;  Location: [facility] CATH LAB;  Service: Cardiology;  Laterality: N/A;     CORONARY ARTERY BYPASS GRAFT (CABG) N/A 1/14/2020    Procedure: CORONARY ARTERY BYPASS GRAFT (CABG) x 1     Off Pump;  Surgeon: Huang Altamirano MD;  Location: [facility] OR [address] [place] [address];  Service: Cardiovascular;  Laterality: N/A;    CREATION OF FEMORAL-TIBIAL ARTERY BYPASS Left 4/29/2021    Procedure: CREATION, BYPASS, ARTERIAL, FEMORAL TO ANTERIOR TIBIAL;  Surgeon: Teddy Huber MD;  [facility];  Service: Vascular;  Laterality: Left;    CREATION OF FEMOROPOPLITEAL ARTERIAL BYPASS USING GRAFT Left 8/18/2020    Procedure: CREATION, BYPASS, ARTERIAL, FEMORAL TO POPLITEAL, USING GRAFT, LEFT LOWER EXTREMITY;  Surgeon: Teddy Huber MD;  Location: [facility] OR;  Service: Vascular;  Laterality: Left;  REQUEST 7:15 A.M. START----COVID NEGATIVE ON 8/17 1ST CASE STARTKENYA DUEÑAS ON 8/7/2020 @ 942AM-  RN PREOP 8/12/2020   T/S-----CLEARED BY CARDS-------PENDING INSURANCE    DEBRIDEMENT OF FOOT Left 9/8/2020    Procedure: DEBRIDEMENT, FOOT;  Surgeon: Rosio Mayes DPM;  Location: [facility] OR;  Service: Podiatry;  Laterality: Left;  request neoxx .   RN Pre Op 9-4-2020, Covid negative on 9/5/20. C A    DEBRIDEMENT OF FOOT  3/4/2021    Procedure: DEBRIDEMENT, FOOT;  Surgeon: Teddy Huber MD;  Location: [facility] OR;  Service: Vascular;;    DEBRIDEMENT OF FOOT Left 3/9/2021    Procedure: DEBRIDEMENT, FOOT, bone biopsy;  Surgeon: Rosio Mayes DPM;  Location: [facility] OR;  Service: Podiatry;  Laterality: Left;  Request neoxx---COVID IN AM  REQUESTING NOON START  RN Phone Pre op.On Blood thinners Plavix and Eliquis.  Covid am of surgery. C A    DEBRIDEMENT OF FOOT Left 5/4/2021    Procedure: DEBRIDEMENT, FOOT;  Surgeon: Farooq Morley DPM;  [facility];  Service: Podiatry;  Laterality: Left;    ESOPHAGOGASTRODUODENOSCOPY N/A 11/7/2024    Procedure: EGD (ESOPHAGOGASTRODUODENOSCOPY);  Surgeon: Yony Cancino MD;  Location: [facility];  Service: Endoscopy;  Laterality: N/A;     INSERTION OF TUNNELED CENTRAL VENOUS HEMODIALYSIS CATHETER N/A 1/27/2020    Procedure: Insertion, Catheter, Central Venous, Hemodialysis;  Surgeon: ESTEBAN Gomez III, MD;  Location: [facility] CATH LAB;  Service: Peripheral Vascular;  Laterality: N/A;    INSERTION OF TUNNELED CENTRAL VENOUS HEMODIALYSIS CATHETER  5/10/2023    Procedure: Insertion, Catheter, Central Venous, Hemodialysis;  Surgeon: Romulo Queen MD;  Location: [facility] CATH LAB;  Service: Cardiology;;    INSERTION, CATHETER, DIALYSIS, PERITONEAL, LAPAROSCOPIC N/A 6/3/2025    Procedure: INSERTION, CATHETER, DIALYSIS, PERITONEAL, LAPAROSCOPIC;  Surgeon: Kvng Tai MD;  Location: [facility] OR;  Service: General;  Laterality: N/A;    IRRIGATION AND DEBRIDEMENT OF LOWER EXTREMITY Left 11/22/2024    Procedure: IRRIGATION AND DEBRIDEMENT, LOWER EXTREMITY;  Surgeon: Yony Lindsey MD;  Location: [facility] 2ND FLR;  Service: Vascular;  Laterality: Left;    MAGNETIC RESONANCE IMAGING Left 11/19/2024    Procedure: MRI (Magnetic Resonance Imagine);  Surgeon: Sophia Hernandez;  Location: [facility] SOPHIA;  Service: Anesthesiology;  Laterality: Left;    PERCUTANEOUS TRANSLUMINAL ANGIOPLASTY N/A 3/4/2021    Procedure: PTA (ANGIOPLASTY, PERCUTANEOUS, TRANSLUMINAL);  Surgeon: Teddy Huber MD;  Location: [facility] OR;  Service: Vascular;  Laterality: N/A;    REMOVAL OF ARTERIOVENOUS GRAFT Left 5/27/2021    Procedure: REMOVAL, GRAFT, LEFT LOWER EXTREMITY, WOUND EXPLORATION;  Surgeon: Teddy Huber MD;  Location: [facility] 2ND FLR;  Service: Vascular;  Laterality: Left;    REMOVAL OF NAIL OF DIGIT Left 3/9/2021    Procedure: REMOVAL, NAIL, DIGIT;  Surgeon: Rosio Mayes DPM;  Location: [facility] OR;  Service: Podiatry;  Laterality: Left;    RIGHT HEART CATHETERIZATION Right 5/10/2023    Procedure: INSERTION, CATHETER, RIGHT HEART;  Surgeon: Romulo Queen MD;  Location: [facility] CATH LAB;  Service: Cardiology;  Laterality: Right;    STENT, ILIAC ARTERY Left  11/22/2024    Procedure: STENT, ILIAC ARTERY;  Surgeon: Yony Lindsey MD;  Location: [facility] OR 2ND FLR;  Service: Vascular;  Laterality: Left;    THROMBECTOMY Left 3/4/2021    Procedure: THROMBECTOMY, LEFT LOWER EXTREMITY BYPASS GRAFT, ANGIOGRAM, POSSIBLE INTERVENTION, POSSIBLE LEFT LOWER EXTREMITY BYPASS;  Surgeon: Teddy Huber MD;  Location: [facility] OR;  Service: Vascular;  Laterality: Left;    THROMBECTOMY Left 4/29/2021    Procedure: GRAFT THROMBECTOMY, LEFT LOWER EXTREMITY;  Surgeon: Teddy Huber MD;  Location: [facility] OR 2ND FLR;  Service: Vascular;  Laterality: Left;  14.5 min  1179.85 mGy  341.01 Gycm2  240 ml dye    THROMBECTOMY  10/22/2021    Procedure: THROMBECTOMY;  Surgeon: Saad Arenas MD;  Location: [facility] CATH LAB/EP;  Service: Cardiology;;     Family History   Problem Relation Name Age of Onset    Diabetes Mother      Diabetes Father      Heart disease Maternal Grandmother      No Known Problems Maternal Grandfather      Diabetes Paternal Grandmother      No Known Problems Paternal Grandfather      Anesthesia problems Neg Hx      Cataracts Neg Hx      Glaucoma Neg Hx      Macular degeneration Neg Hx       Social History     Socioeconomic History    Marital status:    Occupational History    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      Social Drivers of Health     Financial Resource Strain: Low Risk  (7/4/2025)    Overall Financial Resource Strain (CARDIA)     Difficulty of Paying Living Expenses: Not very hard   Food Insecurity: No Food Insecurity (7/4/2025)    Hunger Vital Sign     Worried About Running Out of Food in the Last Year: Never true     Ran Out of Food in the Last  Year: Never true   Transportation Needs: No Transportation Needs (7/4/2025)    PRAPARE - Transportation     Lack of Transportation (Medical): No     Lack of Transportation (Non-Medical): No   Physical Activity: Inactive (7/4/2025)    Exercise Vital Sign     Days of Exercise per Week: 0 days     Minutes of Exercise per Session: 0 min   Stress: No Stress Concern Present (7/4/2025)    [employer] - Occupational Stress Questionnaire     Feeling of Stress : Not at all   Housing Stability: Low Risk  (7/4/2025)    Housing Stability Vital Sign     Unable to Pay for Housing in the Last Year: No     Number of Times Moved in the Last Year: 0     Homeless in the Last Year: No       Medications:  Current Discharge Medication List        CONTINUE these medications which have NOT CHANGED    Details   acetaminophen (TYLENOL) 325 MG tablet Take 2 tablets (650 mg total) by mouth every 8 (eight) hours as needed for Pain.      allopurinoL (ZYLOPRIM) 100 MG tablet TAKE 1/2 TABLET(50 MG) BY MOUTH EVERY OTHER DAY  Qty: 8 tablet, Refills: 11      apixaban (ELIQUIS) 2.5 mg Tab Take 1 tablet (2.5 mg total) by mouth 2 (two) times daily.  Qty: 180 tablet, Refills: 0    Associated Diagnoses: Thrombosis of femoro-popliteal bypass graft, subsequent encounter      atorvastatin (LIPITOR) 80 MG tablet Take 1 tablet (80 mg total) by mouth once daily.  Qty: 90 tablet, Refills: 3    Associated Diagnoses: Coronary artery disease, unspecified vessel or lesion type, unspecified whether angina present, unspecified whether native or transplanted heart      blood sugar diagnostic Strp 1 each by Misc.(Non-Drug; Combo Route) route 4 (four) times daily before meals and nightly.      !! blood-glucose meter Misc Before meals and at bedtime      !! blood-glucose meter Misc 1 each by Misc.(Non-Drug; Combo Route) route 3 (three) times daily.  Qty: 1 each, Refills: 0      blood-glucose sensor (DEXCOM G7 SENSOR) Radha Apply device and  replace every 10 days  Qty: 5 each, Refills: 0      calcitRIOL (ROCALTROL) 0.5 MCG Cap Take 1 capsule (0.5 mcg total) by mouth once daily.  Qty: 30 capsule, Refills: 11      calcium acetate,phosphat bind, (PHOSLO) 667 mg tablet Take 1 tablet (667 mg total) by mouth 2 (two) times a day.  Qty: 60 tablet, Refills: 11      carvediloL (COREG) 6.25 MG tablet Take 6.25 mg by mouth 2 (two) times daily.      cinacalcet (SENSIPAR) 30 MG Tab Take 1 tablet (30 mg total) by mouth every Mon, Wed, Fri.      clopidogreL (PLAVIX) 75 mg tablet Take 1 tablet (75 mg total) by mouth once daily.  Qty: 90 tablet, Refills: 2    Associated Diagnoses: S/P CABG x 1      famotidine (PEPCID) 20 MG tablet TAKE 1 TABLET(20 MG) BY MOUTH TWICE DAILY  Qty: 180 tablet, Refills: 0      furosemide (LASIX) 40 MG tablet Take 1 tablet (40 mg total) by mouth 2 (two) times daily.  Qty: 180 tablet, Refills: 3    Associated Diagnoses: Chronic combined systolic and diastolic congestive heart failure      hydrALAZINE (APRESOLINE) 10 MG tablet Take 1 tablet (10 mg total) by mouth every 12 (twelve) hours.  Qty: 180 tablet, Refills: 3    Comments: .  Associated Diagnoses: Chronic combined systolic and diastolic congestive heart failure      !! insulin aspart U-100 (NOVOLOG FLEXPEN U-100 INSULIN) 100 unit/mL (3 mL) InPn pen Inject 3 Units into the skin 3 (three) times daily with meals.  Qty: 3 mL, Refills: 10      !! insulin aspart U-100 (NOVOLOG) 100 unit/mL (3 mL) InPn pen Inject 5 Units into the skin 3 times daily with meals. **MODERATE CORRECTION DOSE**  151-200 mg/dL Pre-meal: 2 units 2200: 1 unit; 201-250 mg/dL Pre-meal 4 units 2200: 2 units; 251-300mg/dL Pre-meal 6 units 2200: 3 units; 301-350mg/dL Pre-meal 8 units 2200: 4 units; >350mg/dL Pre-meal 10 U 2200: 5 U  Qty: 13.5 mL, Refills: 0      insulin glargine U-100, Lantus, 100 unit/mL (3 mL) SubQ InPn pen Inject 5 Units into the skin every evening.  Qty: 4.5 mL, Refills: 0      isosorbide dinitrate  "(ISORDIL) 10 MG tablet Take 0.5 tablets (5 mg total) by mouth 2 (two) times daily.  Qty: 90 tablet, Refills: 3    Associated Diagnoses: Chronic combined systolic and diastolic congestive heart failure      lancets 33 gauge Misc TEST 3 TIMES DAILY  Qty: 100 each, Refills: 3      multivit with min-folic acid (ONE DAILY WOMENS 50 PLUS) 0.4 mg Tab Take 1 tablet by mouth once daily.      mupirocin (BACTROBAN) 2 % ointment Apply topically 2 (two) times daily.  Qty: 30 g, Refills: 3      OZEMPIC 0.25 mg or 0.5 mg (2 mg/3 mL) pen injector Inject 0.5 mg into the skin every 7 days.  Qty: 9 mL, Refills: 0      pen needle, diabetic (BD MIGUEL 2ND GEN PEN NEEDLE) 32 gauge x 5/32" Ndle Use to inject insulin once daily  Qty: 100 each, Refills: 0      pen needle, diabetic 33 gauge x 5/32" Ndle 1 each by Misc.(Non-Drug; Combo Route) route 4 (four) times daily before meals and nightly.      pregabalin (LYRICA) 75 MG capsule Take 1 capsule (75 mg total) by mouth Daily.  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      sacubitriL-valsartan (ENTRESTO) 24-26 mg per tablet Take 1 tablet by mouth 2 (two) times daily.  Qty: 60 tablet, Refills: 6    Associated Diagnoses: Chronic combined systolic and diastolic congestive heart failure      senna-docusate (PERICOLACE) 8.6-50 mg per tablet Take 1 tablet by mouth 2 (two) times daily.      sertraline (ZOLOFT) 100 MG tablet Take 1 tablet (100 mg total) by mouth once daily.  Qty: 30 tablet, Refills: 11    Associated Diagnoses: CAROLIN (generalized anxiety disorder)      sodium bicarbonate 650 MG tablet TAKE 1 TABLET(650 MG) BY MOUTH THREE TIMES DAILY  Qty: 90 tablet, Refills: 11      traZODone (DESYREL) 50 MG tablet TAKE 1/2 TABLET(25 MG) BY MOUTH EVERY EVENING  Qty: 30 tablet, Refills: 3      zinc oxide 10 % Oint Apply 1 Application topically 2 (two) times a day.  Qty: 226.8 g, " Refills: 0       !! - Potential duplicate medications found. Please discuss with provider.        STOP taking these medications       HYDROcodone-acetaminophen (NORCO) 5-325 mg per tablet Comments:   Reason for Stopping:               Allergies:  Review of patient's allergies indicates:   Allergen Reactions    Ciprofloxacin Itching    Pcn [penicillins]      Rash; tolerated ceftriaxone on 1/13/20  Tolerating Augmentin November 2024         Physical Exam:     Initial Vitals [07/03/25 1127]   BP Pulse Resp Temp SpO2   136/81 90 18 98.3 °F (36.8 °C) 97 %      MAP       --         Physical Exam    Nursing note and vitals reviewed.  Constitutional: She appears well-developed and well-nourished. She is not diaphoretic. No distress.   HENT:   Head: Normocephalic and atraumatic. Mouth/Throat: Oropharynx is clear and moist.   Eyes: EOM are normal. Pupils are equal, round, and reactive to light.   Neck: No tracheal deviation present.   Cardiovascular:  Normal rate, regular rhythm, normal heart sounds and intact distal pulses.           Pulmonary/Chest: Breath sounds normal. No stridor. No respiratory distress. She has no wheezes.   Abdominal: Abdomen is soft. Bowel sounds are normal. She exhibits no distension and no mass. There is no abdominal tenderness.   Musculoskeletal:         General: No edema. Normal range of motion.     Neurological: She is alert. No cranial nerve deficit or sensory deficit. She exhibits normal muscle tone.   Skin: Skin is warm and dry. Capillary refill takes less than 2 seconds. No pallor.   Psychiatric: She has a normal mood and affect. Her behavior is normal. Thought content normal.         Differential Diagnoses:   Based on available information and initial assessment, Differential Diagnosis includes, but is not limited to:  CVA/TIA, vertigo, anemia/blood loss, cardiogenic shock, arrhythmia, orthostatic hypotension, dehydration, medication side effect, vitamin deficiency, liver disease,  "hypothyroidism, drug intoxication/withdrawal, metabolic derangement.      ED Management:   Procedures    Orders Placed This Encounter    Aerobic culture    Gram stain    Fungus culture    Urine culture    CT Head Without Contrast    MRI Brain Without Contrast    Hepatitis C Antibody    HCV Virus Hold Specimen    HIV 1/2 Ag/Ab (4th Gen)    CBC Without Differential    Comprehensive metabolic panel    Urinalysis, Reflex to Urine Culture Urine, Clean Catch    WBC & Diff,Body Fluid    Renal Function Panel    CBC Auto Differential    Hemoglobin A1c if not done in past 3 months    CBC with Differential    GREY TOP URINE HOLD    Urinalysis Microscopic    Diet Consistent Carbohydrate 2000 Calories (up to 75 gm per meal)    Perform daily peritoneal dialysis catheter exit site care    Notify Physician    Cardiac Monitoring - Adult    Confirm Patient is not Eligible for Congestive Heart Failure Pathway    Vital signs    Intake and output    Notify physician     Place sequential compression device    If any glucose result is less than 50 or greater than 400:    If 2nd result is less than 50 or greater than 400:    If glucose greater than 400 mg/dL treat per correction scale.  If glucose remains elevated above 400 mg/dL at next scheduled check, notify provider    Do not admin Aspart correction between scheduled prandial Aspart    Recheck Blood Glucose:    Full code    Inpatient consult to Nephrology    Pulse Oximetry Q4H    Cardiac monitoring strips    Cardiac monitoring strips    EKG 12-lead    Prepare patient for dialysis    Prepare patient for dialysis    Manual exchange peritoneal dialysis    Hemodialysis inpatient If "per protocol" is selected for one or more ingredients (K+, Ca++, Na+, Bicarb) for the dialysate bath solution, select the hyperlink for the protocol instructions.    Prepare patient for dialysis    Manual exchange peritoneal dialysis    Place in Observation    ISTAT CHEM8    ISTAT PROCEDURE    POCT glucose    " POCT glucose    POCT glucose    albuterol sulfate nebulizer solution 10 mg    calcium gluconate 1 g in NS IVPB (premixed)    insulin regular injection 5 Units 0.05 mL    sodium zirconium cyclosilicate packet 10 g    dextrose 50% injection 25 g    0.9% NaCl infusion    0.9% NaCl infusion    sodium chloride 0.9% bolus 250 mL 250 mL    heparin (porcine) injection 1,000 Units    sodium chloride 0.9% flush 10 mL    acetaminophen tablet 650 mg    allopurinoL tablet 100 mg    apixaban tablet 2.5 mg    atorvastatin tablet 80 mg    carvediloL tablet 6.25 mg    cinacalcet tablet 30 mg    clopidogreL tablet 75 mg    famotidine tablet 20 mg    furosemide tablet 40 mg    hydrALAZINE tablet 10 mg    HYDROcodone-acetaminophen 5-325 mg per tablet 1 tablet    isosorbide dinitrate tablet 5 mg    sevelamer carbonate 2.4 gram powder 2.4 g    sacubitriL-valsartan 24-26 mg per tablet 1 tablet    sertraline tablet 100 mg    sodium bicarbonate tablet 650 mg    trazodone split tablet 25 mg    glucose chewable tablet 16 g    glucose chewable tablet 24 g    dextrose 50% injection 12.5 g    dextrose 50% injection 25 g    glucagon (human recombinant) injection 1 mg    insulin aspart U-100 pen 0-5 Units    cefTRIAXone injection 1 g    ondansetron injection 4 mg    Peritoneal Dialysis Solution with Additives    IP VTE HIGH RISK PATIENT    Turn patient every 2 hours    Progressive Mobility Protocol (mobilize patient to their highest level of functioning at least twice daily)          EKG:   EKG interpretation by ED attending physician:  NSR, rate 94, T-wave inversions inferior and lateral leads, no ST elevations, mildly peaked T-waves anterior leads, normal intervals.  Compared with prior EKG dated 06/2025, peaked T-waves are new.     Labs:     Labs Reviewed   CBC WITHOUT DIFFERENTIAL - Abnormal       Result Value    WBC 6.99      RBC 3.72 (*)     HGB 11.4 (*)     HCT 36.9 (*)     MCV 99 (*)     MCHC 30.9 (*)     RDW 18.2 (*)     Platelet Count  244      MCH 30.6      MPV 10.8     COMPREHENSIVE METABOLIC PANEL - Abnormal    Sodium 129 (*)     Potassium 7.0 (*)     Chloride 96      CO2 11 (*)     Glucose 84       (*)     Creatinine 9.8 (*)     Calcium 8.8      Protein Total 9.2 (*)     Albumin 3.7      Bilirubin Total 0.7       (*)     AST 18      ALT 10      Anion Gap 22 (*)     eGFR 4 (*)    HEMOGLOBIN A1C - Abnormal    Hemoglobin A1c 6.2 (*)     Estimated Average Glucose 131     ISTAT PROCEDURE - Abnormal    POC Glucose 142 (*)     POC  (*)     POC Creatinine 11.1 (*)     POC Sodium 131 (*)     POC Potassium 6.0 (*)     POC Chloride 101      POC TCO2 (MEASURED) 15 (*)     POC Ionized Calcium 1.05 (*)     POC Hematocrit 29 (*)     Sample LAURA     HEPATITIS C ANTIBODY - Normal    Hep C Ab Interp Non-Reactive     HIV 1 / 2 ANTIBODY - Normal    HIV 1/2 Ag/Ab Non-Reactive     CULTURE, AEROBIC  (SPECIFY SOURCE)   CULTURE, FUNGUS   WBC & DIFF, BODY FLUID    Appearance, BF Clear      Color, BF Yellow      WBC,       Neutrophils/Bands, Man Diff % BF 32      Lymphocyte % 8      Monocytes/Macrophages, Man Diff % BF 40      Mesothelial Cells BF % 20      Narrative:     Reference ranges for body fluids not established. Correlate clinically.     Rare cells with hemosiderin crystals   HEP C VIRUS HOLD SPECIMEN   ISTAT CHEM8   POCT GLUCOSE MONITORING CONTINUOUS     Independent review of the labs ordered include:   - CMP with hyperkalemia, medically shifted  - CBC stable anemia    Imaging:     Imaging Results               CT Head Without Contrast (Final result)  Result time 07/03/25 22:19:40      Final result by Chele Kumar MD (07/03/25 22:19:40)                   Impression:      Sulcal crowding at the apex with vague sulcal hyperattenuation, suggestive of pseudosubarachnoid hemorrhage, favoring mild cerebral edema.  MRI of the brain may be obtained for further assessment.    This report was flagged in Epic as abnormal.    Electronically  signed by resident: Brannon Fuentes  Date:    07/03/2025  Time:    20:27    Electronically signed by: Chele Kumar MD  Date:    07/03/2025  Time:    22:19               Narrative:    EXAMINATION:  CT HEAD WITHOUT CONTRAST    CLINICAL HISTORY:  Mental status change, unknown cause;    TECHNIQUE:  Low dose axial CT images obtained throughout the head without the use of intravenous contrast.  Axial, sagittal and coronal reconstructions were performed.    COMPARISON:  CT head 06/30/2025 and CT head dated 11/14/2024.    MRI of the brain dated 05/29/2024.    FINDINGS:  Sulcal crowding at the apex with vague hyperattenuation adjacent to the sulci.  Basal cisterns are patent.    The brain parenchyma appears unchanged.  No definite intraparenchymal hemorrhage or acute major vascular distribution infarct.  No mass effect or midline shift.    The ventricles are similar in size and configuration with prominence of the temporal horns.  No over hydrocephalus.  Previous a characterized calcified subependymal nodules remain unchanged compared to the prior examination.  These were better evaluated on the prior MRI of the brain.    No new extra-axial blood or fluid collections.    No displaced calvarial fracture.  Hyperostosis frontalis.    Mastoid air cells and paranasal sinuses are essentially clear.                                         Medications Given:     Medications   sodium zirconium cyclosilicate packet 10 g (10 g Oral Not Given 7/3/25 1430)   0.9% NaCl infusion (has no administration in time range)   0.9% NaCl infusion ( Intravenous Not Given 7/3/25 1600)   sodium chloride 0.9% bolus 250 mL 250 mL (has no administration in time range)   heparin (porcine) injection 1,000 Units (1,000 Units Intra-Catheter Given 7/3/25 1957)   sodium chloride 0.9% flush 10 mL (has no administration in time range)   acetaminophen tablet 650 mg (650 mg Oral Given 7/4/25 1244)   allopurinoL tablet 100 mg (100 mg Oral Not Given 7/4/25 0900)   apixaban  tablet 2.5 mg (2.5 mg Oral Not Given 7/4/25 0900)   atorvastatin tablet 80 mg (80 mg Oral Not Given 7/4/25 0900)   carvediloL tablet 6.25 mg (6.25 mg Oral Not Given 7/4/25 0927)   cinacalcet tablet 30 mg (has no administration in time range)   clopidogreL tablet 75 mg (75 mg Oral Not Given 7/4/25 0900)   famotidine tablet 20 mg (20 mg Oral Not Given 7/4/25 0900)   furosemide tablet 40 mg (40 mg Oral Not Given 7/4/25 0900)   hydrALAZINE tablet 10 mg (10 mg Oral Not Given 7/4/25 0900)   HYDROcodone-acetaminophen 5-325 mg per tablet 1 tablet (1 tablet Oral Given 7/4/25 1351)   isosorbide dinitrate tablet 5 mg (5 mg Oral Not Given 7/4/25 0900)   sevelamer carbonate 2.4 gram powder 2.4 g (2.4 g Oral Given 7/4/25 1259)   sacubitriL-valsartan 24-26 mg per tablet 1 tablet (1 tablet Oral Not Given 7/4/25 0900)   sertraline tablet 100 mg (100 mg Oral Not Given 7/4/25 0900)   sodium bicarbonate tablet 650 mg (650 mg Oral Not Given 7/4/25 0900)   trazodone split tablet 25 mg (25 mg Oral Given 7/3/25 2331)   glucose chewable tablet 16 g (has no administration in time range)   glucose chewable tablet 24 g (has no administration in time range)   dextrose 50% injection 12.5 g (has no administration in time range)   dextrose 50% injection 25 g (has no administration in time range)   glucagon (human recombinant) injection 1 mg (has no administration in time range)   insulin aspart U-100 pen 0-5 Units (has no administration in time range)   cefTRIAXone injection 1 g (1 g Intravenous Given 7/4/25 0900)   ondansetron injection 4 mg (4 mg Intravenous Given 7/4/25 0944)   Peritoneal Dialysis Solution with Additives (has no administration in time range)   albuterol sulfate nebulizer solution 10 mg (10 mg Nebulization Given 7/3/25 6716)   calcium gluconate 1 g in NS IVPB (premixed) (0 g Intravenous Stopped 7/3/25 1605)   insulin regular injection 5 Units 0.05 mL (5 Units Intravenous Given 7/3/25 1530)   dextrose 50% injection 25 g (25 g  Intravenous Given 7/3/25 150)        Medical Decision Making:    Additional Consideration:   Additional testing considered during clinical course: labs/imaging considered including:  - none    Social determinants of health considered during development of treatment plan include: poor access to care    Current co-morbidities considered which impacted clinical decision making: ESRD on HD/PD, anemia, a-fib, DM, CAD    Case discussed with additional provider:   Nephrology, Dr. Paniagua, will evaluate for emergent HD  HM, Dr. Corbin, will admit for medical management of hyperkalemia in setting of ESRD              Medical Decision Making  Problems Addressed:  Hyperkalemia: acute illness or injury    Amount and/or Complexity of Data Reviewed  External Data Reviewed: notes.  Labs: ordered. Decision-making details documented in ED Course.  Radiology: ordered and independent interpretation performed. Decision-making details documented in ED Course.  ECG/medicine tests: ordered and independent interpretation performed. Decision-making details documented in ED Course.    Risk  Drug therapy requiring intensive monitoring for toxicity.  Decision regarding hospitalization.  Diagnosis or treatment significantly limited by social determinants of health.    Critical Care  Total time providing critical care: 45 minutes          Clinical Impression:       ICD-10-CM ICD-9-CM   1. Hyperkalemia  E87.5 276.7         Follow-up Information    None          ED Disposition Condition    Observation               On re-evaluation, the patient's status has remained serious.  At this time, I believe the patient should be admitted to the hospital for further evaluation and management.  The consulting physician/team agrees with plan and will admit under their service.   The patient and family were updated with test results, overall impression, and further plan of care. All questions were answered.       Vivek Stringer MD  07/04/25 3819

## 2025-07-03 NOTE — ASSESSMENT & PLAN NOTE
Nephrology History  -Outpatient HD unit: [facility]  -Nephrologist:   -HD tx days: Previously MWF; now once weekly only as transitioning to PD  -Last HD tx: 6/27  -HD access: tunneled IJ cath  -HD modality: iHD/PD  -Residual urine: +  -EDW: 67kg?    - Patient for emergent HD session today given her hyperkalemia  - Reassess need for continued iHD pending morning labs  - Strict I&O, pre and post dialysis weights  - Renal diet if able to take po

## 2025-07-03 NOTE — PROGRESS NOTES
Pharmacist Renal Dose Adjustment Note    Suyapa Connelly is a 59 y.o. female being treated with famotidine 20 mg twice daily     Patient Data:    Vital Signs (Most Recent):  Temp: 98.3 °F (36.8 °C) (07/03/25 1127)  Pulse: 97 (07/03/25 1612)  Resp: 12 (07/03/25 1612)  BP: 131/60 (07/03/25 1612)  SpO2: 96 % (07/03/25 1612) Vital Signs (72h Range):  Temp:  [98.3 °F (36.8 °C)]   Pulse:  [90-98]   Resp:  [10-19]   BP: (130-140)/(55-81)   SpO2:  [91 %-97 %]      Recent Labs   Lab 06/30/25  0932 07/03/25  1233   CREATININE 6.4* 9.8*     Serum creatinine: 9.8 mg/dL (H) 07/03/25 1233  Estimated creatinine clearance: 5.6 mL/min (A)    Adjusted to   Famotidine 20 mg every 48 hour per pharmacy renal protocol     Pharmacist's Name: Claudette Saha  Pharmacist's Extension: [phone]

## 2025-07-03 NOTE — ASSESSMENT & PLAN NOTE
Infectious vs uremic etiology    -Urinalysis w culture pending; if positive will need antibiotics  -Peritoneal fluid culture and gram stain pending to evaluate for peritonitis although low suspicion  -HD session today  -Continue to monitor symptoms

## 2025-07-03 NOTE — NURSING
Dialysis Nurse arrived at the bedside . Both Lumens of Left Hd catheter flushes good . Dialysis treatment started .   Primary Nurse made aware.

## 2025-07-03 NOTE — CONSULTS
[facility] - Emergency Dept  Nephrology  Consult Note    Patient Name: Suyapa Connelly  MRN: [id number]  Admission Date: 7/3/2025  Hospital Length of Stay: 0 days  Attending Provider: Randell Garcia MD   Primary Care Physician: Vanessa Noel MD  Principal Problem:Hyperkalemia    Inpatient consult to Nephrology  Consult performed by: Hayley Paniagua MD  Consult ordered by: Randell Garcia MD  Reason for consult: hyperkalemia, ESRD        Subjective:     HPI: Patient is a 59-year-old female with past medical history as stated below significant for ESRD secondary to hypertensive disease previously on HD MWF schedule via tunneled IJ cath currently transitioning to home PD, type II diabetes mellitus, hypertension, hyperlipidemia, severe PAD s/p bilateral AKA c/b poor healing, L common iliac stent, CAD s/p CABG, HFrEF, obesity, pAF, depression, CAROLIN, mod-sev TR who presented to ED for lethargy, nausea/vomiting, dysuria and abnormal urine appearance. She is accompanied by her  who provides the majority of the history as patient appears to be somnolent during exam.   endorses that patient has been having nausea/vomiting and lethargy for the past few days but the dysuria started today. They are currently training for peritoneal dialysis at home. She had the peritoneal dialysis catheter placed 2-3 weeks ago and has been training over the past week. Her last full hemodialysis session was approximately one week ago. She went to her ophthalmologist today with eye pain and blurry vision; she was diagnosed with blepheritis. However, was recommended to present to ED for further evaluation of her lethargy and urinary symptoms.  ER Course: Afebrile and otherwise hemodynamically stable on room air. CBC with normal wbc, stable anemia with hemoglobin 11, normal platelets. CMP notable for hyponatremia 129, hyperkalemia 7.0, metabolic acidosis with bicarb 11, BUN/sCr 113/9.8.  EKG personally reviewed with T wave  changes most significant in leads V2 and V3. Nephrology consulted for hyperkalemia and ESRD management.     Past Medical History:   Diagnosis Date    Anxiety     Chronic pain syndrome     CKD (chronic kidney disease), stage III     CVD (cardiovascular disease)     Depression     Diabetes mellitus, type 2     GERD (gastroesophageal reflux disease)     Hyperemesis 03/23/2021    Hypokalemia 03/23/2021    Infection of below knee amputation stump 03/12/2022    Osteomyelitis     Osteomyelitis of left foot 04/30/2021    Ulcer of left foot     Vaginal delivery     x1       Past Surgical History:   Procedure Laterality Date    ABOVE-KNEE AMPUTATION Left 5/18/2021    Procedure: AMPUTATION, ABOVE KNEE;  Surgeon: Teddy Huber MD;  Location: [facility] OR [address] [place] [address];  Service: Vascular;  Laterality: Left;    ABOVE-KNEE AMPUTATION Right 3/18/2022    Procedure: AMPUTATION, ABOVE KNEE;  Surgeon: DAYNE Florez II, MD;  Location: [address];  Service: Vascular;  Laterality: Right;    Angiogram - Right Extremity Right 7/9/15    ANGIOGRAM, EXTREMITY, UNILATERAL Left 11/22/2024    Procedure: ANGIOGRAM, EXTREMITY, UNILATERAL;  Surgeon: Yony Lindsey MD;  Location: [address];  Service: Vascular;  Laterality: Left;  Left leg angio, L CFA approach  5.4 min  597.31 mGy  73.8959 Gycm2  20 ml dye    angiogram-left leg  10/6/15    ANGIOGRAPHY OF LOWER EXTREMITY Left 4/29/2021    Procedure: ANGIOGRAM, LOWER EXTREMITY;  Surgeon: Teddy Huber MD;  Location: [address];  Service: Vascular;  Laterality: Left;    BELOW KNEE AMPUTATION OF LOWER EXTREMITY Right 12/28/2021    Procedure: AMPUTATION, BELOW KNEE;  Surgeon: Kaitlyn Rojas MD;  Location: [facility] OR;  Service: General;  Laterality: Right;    CATHETERIZATION OF BOTH LEFT AND RIGHT HEART N/A 12/18/2019    Procedure: CATHETERIZATION, HEART, BOTH LEFT AND RIGHT;  Surgeon: Que Fernando III, MD;  Location: [facility] CATH LAB;  Service: Cardiology;   Laterality: N/A;    CORONARY ANGIOGRAPHY N/A 12/18/2019    Procedure: ANGIOGRAM, CORONARY ARTERY;  Surgeon: Que Fernando III, MD;  Location: [facility] CATH LAB;  Service: Cardiology;  Laterality: N/A;    CORONARY ANGIOGRAPHY INCLUDING BYPASS GRAFTS WITH CATHETERIZATION OF LEFT HEART N/A 7/28/2020    Procedure: ANGIOGRAM, CORONARY, INCLUDING BYPASS GRAFT, WITH LEFT HEART CATHETERIZATION, 9 am;  Surgeon: Rachel Easley MD;  Location: [facility] CATH LAB;  Service: Cardiology;  Laterality: N/A;    CORONARY ARTERY BYPASS GRAFT (CABG) N/A 1/14/2020    Procedure: CORONARY ARTERY BYPASS GRAFT (CABG) x 1     Off Pump;  Surgeon: Huang Altamirano MD;  Location: [facility] OR [address] [place] [address];  Service: Cardiovascular;  Laterality: N/A;    CREATION OF FEMORAL-TIBIAL ARTERY BYPASS Left 4/29/2021    Procedure: CREATION, BYPASS, ARTERIAL, FEMORAL TO ANTERIOR TIBIAL;  Surgeon: Teddy Huber MD;  [facility];  Service: Vascular;  Laterality: Left;    CREATION OF FEMOROPOPLITEAL ARTERIAL BYPASS USING GRAFT Left 8/18/2020    Procedure: CREATION, BYPASS, ARTERIAL, FEMORAL TO POPLITEAL, USING GRAFT, LEFT LOWER EXTREMITY;  Surgeon: Teddy Huber MD;  Location: [facility] OR;  Service: Vascular;  Laterality: Left;  REQUEST 7:15 A.M. START----COVID NEGATIVE ON 8/17  1ST CASE STARTKENYA PER LEANA ON 8/7/2020 @ 942AM-LO  RN PREOP 8/12/2020   T/S-----CLEARED BY CARDS-------PENDING INSURANCE    DEBRIDEMENT OF FOOT Left 9/8/2020    Procedure: DEBRIDEMENT, FOOT;  Surgeon: Rosio Mayes DPM;  Location: [facility] OR;  Service: Podiatry;  Laterality: Left;  request neoxx .   RN Pre Op 9-4-2020, Covid negative on 9/5/20. C A    DEBRIDEMENT OF FOOT  3/4/2021    Procedure: DEBRIDEMENT, FOOT;  Surgeon: Teddy Huber MD;  Location: [facility] OR;  Service: Vascular;;    DEBRIDEMENT OF FOOT Left 3/9/2021    Procedure: DEBRIDEMENT, FOOT, bone biopsy;  Surgeon: Rosio Mayes DPM;  Location: [facility] OR;  Service: Podiatry;  Laterality: Left;  Request  neoxx---COVID IN AM  REQUESTING NOON START  RN Phone Pre op.On Blood thinners Plavix and Eliquis.  Covid am of surgery. C A    DEBRIDEMENT OF FOOT Left 5/4/2021    Procedure: DEBRIDEMENT, FOOT;  Surgeon: Farooq Morley DPM;  Location: [facility] OR 2ND FLR;  Service: Podiatry;  Laterality: Left;    ESOPHAGOGASTRODUODENOSCOPY N/A 11/7/2024    Procedure: EGD (ESOPHAGOGASTRODUODENOSCOPY);  Surgeon: Yony Cancino MD;  Location: [facility] ENDO;  Service: Endoscopy;  Laterality: N/A;    INSERTION OF TUNNELED CENTRAL VENOUS HEMODIALYSIS CATHETER N/A 1/27/2020    Procedure: Insertion, Catheter, Central Venous, Hemodialysis;  Surgeon: ESTEBAN Gomez III, MD;  Location: [facility] CATH LAB;  Service: Peripheral Vascular;  Laterality: N/A;    INSERTION OF TUNNELED CENTRAL VENOUS HEMODIALYSIS CATHETER  5/10/2023    Procedure: Insertion, Catheter, Central Venous, Hemodialysis;  Surgeon: Romulo Queen MD;  Location: [facility] CATH LAB;  Service: Cardiology;;    INSERTION, CATHETER, DIALYSIS, PERITONEAL, LAPAROSCOPIC N/A 6/3/2025    Procedure: INSERTION, CATHETER, DIALYSIS, PERITONEAL, LAPAROSCOPIC;  Surgeon: Kvng Tai MD;  Location: [facility] OR;  Service: General;  Laterality: N/A;    IRRIGATION AND DEBRIDEMENT OF LOWER EXTREMITY Left 11/22/2024    Procedure: IRRIGATION AND DEBRIDEMENT, LOWER EXTREMITY;  Surgeon: Yony Lindsey MD;  [address]  Service: Vascular;  Laterality: Left;    MAGNETIC RESONANCE IMAGING Left 11/19/2024    Procedure: MRI (Magnetic Resonance Imagine);  Surgeon: Sophia Hernandez;  Location: [facility] SOPHIA;  Service: Anesthesiology;  Laterality: Left;    PERCUTANEOUS TRANSLUMINAL ANGIOPLASTY N/A 3/4/2021    Procedure: PTA (ANGIOPLASTY, PERCUTANEOUS, TRANSLUMINAL);  Surgeon: Teddy Huber MD;  Location: [facility] OR;  Service: Vascular;  Laterality: N/A;    REMOVAL OF ARTERIOVENOUS GRAFT Left 5/27/2021    Procedure: REMOVAL, GRAFT, LEFT LOWER EXTREMITY, WOUND EXPLORATION;  Surgeon:  Teddy Huber MD;  Location: [facility] OR [facility] FLR;  Service: Vascular;  Laterality: Left;    REMOVAL OF NAIL OF DIGIT Left 3/9/2021    Procedure: REMOVAL, NAIL, DIGIT;  Surgeon: Rosio Mayes DPM;  Location: [facility] OR;  Service: Podiatry;  Laterality: Left;    RIGHT HEART CATHETERIZATION Right 5/10/2023    Procedure: INSERTION, CATHETER, RIGHT HEART;  Surgeon: Romulo Queen MD;  Location: [facility] CATH LAB;  Service: Cardiology;  Laterality: Right;    STENT, ILIAC ARTERY Left 11/22/2024    Procedure: STENT, ILIAC ARTERY;  Surgeon: Yony Lindsey MD;  Location: [facility] OR [facility] FLR;  Service: Vascular;  Laterality: Left;    THROMBECTOMY Left 3/4/2021    Procedure: THROMBECTOMY, LEFT LOWER EXTREMITY BYPASS GRAFT, ANGIOGRAM, POSSIBLE INTERVENTION, POSSIBLE LEFT LOWER EXTREMITY BYPASS;  Surgeon: Teddy Huber MD;  Location: [facility] OR;  Service: Vascular;  Laterality: Left;    THROMBECTOMY Left 4/29/2021    Procedure: GRAFT THROMBECTOMY, LEFT LOWER EXTREMITY;  Surgeon: Teddy Huber MD;  [address]  Service: Vascular;  Laterality: Left;  14.5 min  1179.85 mGy  341.01 Gycm2  240 ml dye    THROMBECTOMY  10/22/2021    Procedure: THROMBECTOMY;  Surgeon: Saad Arenas MD;  Location: [facility] CATH LAB/EP;  Service: Cardiology;;       Review of patient's allergies indicates:   Allergen Reactions    Ciprofloxacin Itching    Pcn [penicillins]      Rash; tolerated ceftriaxone on 1/13/20  Tolerating Augmentin November 2024     Current Facility-Administered Medications   Medication Frequency    calcium gluconate 1 g in NS IVPB (premixed) ED 1 Time    dextrose 50% injection 25 g ED 1 Time    insulin regular injection 5 Units 0.05 mL ED 1 Time    sodium zirconium cyclosilicate packet 10 g ED 1 Time     Current Outpatient Medications   Medication    acetaminophen (TYLENOL) 325 MG tablet    allopurinoL (ZYLOPRIM) 100 MG tablet    apixaban (ELIQUIS) 2.5 mg Tab    atorvastatin (LIPITOR) 80 MG tablet     "blood sugar diagnostic Strp    blood-glucose meter Misc    blood-glucose meter Misc    blood-glucose sensor (DEXCOM G7 SENSOR) Radha    calcitRIOL (ROCALTROL) 0.5 MCG Cap    calcium acetate,phosphat bind, (PHOSLO) 667 mg tablet    carvediloL (COREG) 6.25 MG tablet    cinacalcet (SENSIPAR) 30 MG Tab    clopidogreL (PLAVIX) 75 mg tablet    famotidine (PEPCID) 20 MG tablet    furosemide (LASIX) 40 MG tablet    hydrALAZINE (APRESOLINE) 10 MG tablet    HYDROcodone-acetaminophen (NORCO) 5-325 mg per tablet    insulin aspart U-100 (NOVOLOG FLEXPEN U-100 INSULIN) 100 unit/mL (3 mL) InPn pen    insulin aspart U-100 (NOVOLOG) 100 unit/mL (3 mL) InPn pen    insulin glargine U-100, Lantus, 100 unit/mL (3 mL) SubQ InPn pen    isosorbide dinitrate (ISORDIL) 10 MG tablet    lancets 33 gauge Misc    multivit with min-folic acid (ONE DAILY WOMENS 50 PLUS) 0.4 mg Tab    mupirocin (BACTROBAN) 2 % ointment    OZEMPIC 0.25 mg or 0.5 mg (2 mg/3 mL) pen injector    pen needle, diabetic (BD MIGUEL 2ND GEN PEN NEEDLE) 32 gauge x 5/32" Ndle    pen needle, diabetic 33 gauge x 5/32" Ndle    pregabalin (LYRICA) 75 MG capsule    RENVELA 2.4 gram PwPk    sacubitriL-valsartan (ENTRESTO) 24-26 mg per tablet    senna-docusate (PERICOLACE) 8.6-50 mg per tablet    sertraline (ZOLOFT) 100 MG tablet    sodium bicarbonate 650 MG tablet    traZODone (DESYREL) 50 MG tablet    zinc oxide 10 % Oint     Family History       Problem Relation (Age of Onset)    Diabetes Mother, Father, Paternal Grandmother    Heart disease Maternal Grandmother    No Known Problems Maternal Grandfather, Paternal Grandfather          Tobacco Use    Smoking status: Former     Passive exposure: Never    Smokeless tobacco: Never   Substance and Sexual Activity    Alcohol use: No    Drug use: Yes     Types: Marijuana     Comment: occassional    Sexual activity: Yes     Partners: Male     Review of Systems  Objective:     Vital Signs (Most Recent):  Temp: 98.3 °F (36.8 °C) (07/03/25 " 1127)  Pulse: 92 (07/03/25 1456)  Resp: 18 (07/03/25 1456)  BP: (!) 139/57 (07/03/25 1400)  SpO2: (!) 94 % (07/03/25 1456) Vital Signs (24h Range):  Temp:  [98.3 °F (36.8 °C)] 98.3 °F (36.8 °C)  Pulse:  [90-93] 92  Resp:  [12-19] 18  SpO2:  [91 %-97 %] 94 %  BP: (132-139)/(57-81) 139/57     Weight: 59 kg (130 lb) (07/03/25 1127)  Body mass index is 21.63 kg/m².  Body surface area is 1.64 meters squared.    No intake/output data recorded.     Physical Exam  Vitals and nursing note reviewed.   Constitutional:       General: She is not in acute distress.     Appearance: Normal appearance. She is ill-appearing.   Eyes:      General: No scleral icterus.  Cardiovascular:      Rate and Rhythm: Normal rate and regular rhythm.      Pulses: Normal pulses.   Pulmonary:      Effort: Pulmonary effort is normal. No respiratory distress.   Abdominal:      General: There is no distension.      Palpations: Abdomen is soft.      Tenderness: Tenderness: mild TTP of LLQ.   Musculoskeletal:         General: Deformity: bilateral AKA. Normal range of motion.   Skin:     General: Skin is warm and dry.      Findings: No erythema.   Neurological:      General: No focal deficit present.      Mental Status: Mental status is at baseline.   Psychiatric:         Mood and Affect: Mood normal.          Significant Labs:  All labs within the past 24 hours have been reviewed.    Significant Imaging:  Labs: Reviewed  ECG: Reviewed    Assessment/Plan:     Neuro  Encephalopathy  Infectious vs uremic etiology    -Urinalysis w culture pending; if positive will need antibiotics  -Peritoneal fluid culture and gram stain pending to evaluate for peritonitis although low suspicion  -HD session today  -Continue to monitor symptoms    Cardiac/Vascular  Paroxysmal atrial fibrillation  - Management per primary team    Renal/  * Hyperkalemia  K 7.0 on presentation with EKG changes. Last full HD session was 6/27.    -HD today  -Trend K levels    ESRD (end stage  renal disease)  Nephrology History  -Outpatient HD unit: [facility]  -Nephrologist:   -HD tx days: Previously MWF; now once weekly only as transitioning to PD  -Last HD tx: 6/27  -HD access: tunneled IJ cath  -HD modality: iHD/PD  -Residual urine: +  -EDW: 67kg?    - Patient for emergent HD session today given her hyperkalemia  - Reassess need for continued iHD pending morning labs  - Strict I&O, pre and post dialysis weights  - Renal diet if able to take po        Oncology  Anemia due to chronic kidney disease, on chronic dialysis  Current hemoglobin >10. Continue to trend    -Defer EPO administration at this time but will continue to assess need with HD sessions      Patient is at high risk for mortality in setting of hyperkalemia, encephalopathy, ESRD requiring HD, peripheral vascular disease requiring bilateral AKA and other comorbidiites.     Thank you for your consult. I will follow-up with patient. Please contact us if you have any additional questions.    Hayley Paniagua MD  Nephrology PGY-4  [facility] - Emergency Dept

## 2025-07-03 NOTE — PROGRESS NOTES
07/03/25 1800 07/03/25 1803 07/03/25 1805        Peritoneal Dialysis Catheter Mid lower abdomen   No placement date or time found.   Present Prior to Hospital Arrival?: Yes  Catheter Location: Mid lower abdomen   Site Assessment  --  Clean;Dry;Intact;No redness;No swelling  --    Dressing Intervention  --  Sterile dressing change  --    Status Accessed  --  Deaccessed   Dressing Status  --  Clean;Dry;Intact  --    Dressing  --  Gauze  --    Securement  --  secured to abdomen w/ adhesive device  --    Clamp Status unclamped  --  clamped   Peritoneal Dialysis   Exchange Type Manual  --   --    Peritoneal Treatment Status Started  --   --    Dianeal Solution Dextrose 1.5% in 2000 mL  --   --    Manual Peritoneal Dialysis   Manual Fill Volume (mL)  --   --  1000 mL   Manual Drain Volume (mL) 10 mL  --   --    Effluent Appearance Batsheva  --   --      Arrived to ED room 29. CAPD started..

## 2025-07-04 LAB
ABSOLUTE EOSINOPHIL (OHS): 0.06 K/UL
ABSOLUTE MONOCYTE (OHS): 1.23 K/UL (ref 0.3–1)
ABSOLUTE NEUTROPHIL COUNT (OHS): 4.29 K/UL (ref 1.8–7.7)
ALBUMIN SERPL BCP-MCNC: 2.9 G/DL (ref 3.5–5.2)
ANION GAP (OHS): 16 MMOL/L (ref 8–16)
BACTERIA #/AREA URNS AUTO: ABNORMAL /HPF
BASOPHILS # BLD AUTO: 0.05 K/UL
BASOPHILS NFR BLD AUTO: 0.8 %
BILIRUB UR QL STRIP.AUTO: NEGATIVE
BUN SERPL-MCNC: 43 MG/DL (ref 6–20)
CALCIUM SERPL-MCNC: 8.4 MG/DL (ref 8.7–10.5)
CHLORIDE SERPL-SCNC: 97 MMOL/L (ref 95–110)
CLARITY UR: ABNORMAL
CO2 SERPL-SCNC: 21 MMOL/L (ref 23–29)
COLOR UR AUTO: ABNORMAL
CREAT SERPL-MCNC: 5.2 MG/DL (ref 0.5–1.4)
ERYTHROCYTE [DISTWIDTH] IN BLOOD BY AUTOMATED COUNT: 18.3 % (ref 11.5–14.5)
GFR SERPLBLD CREATININE-BSD FMLA CKD-EPI: 9 ML/MIN/1.73/M2
GLUCOSE SERPL-MCNC: 100 MG/DL (ref 70–110)
GLUCOSE UR QL STRIP: NEGATIVE
GRAM STN SPEC: NORMAL
GRAM STN SPEC: NORMAL
HCT VFR BLD AUTO: 28.7 % (ref 37–48.5)
HGB BLD-MCNC: 9.1 GM/DL (ref 12–16)
HGB UR QL STRIP: ABNORMAL
HYALINE CASTS UR QL AUTO: 0 /LPF (ref 0–1)
IMM GRANULOCYTES # BLD AUTO: 0.05 K/UL (ref 0–0.04)
IMM GRANULOCYTES NFR BLD AUTO: 0.8 % (ref 0–0.5)
KETONES UR QL STRIP: NEGATIVE
LEUKOCYTE ESTERASE UR QL STRIP: ABNORMAL
LYMPHOCYTES # BLD AUTO: 0.48 K/UL (ref 1–4.8)
MCH RBC QN AUTO: 30.6 PG (ref 27–31)
MCHC RBC AUTO-ENTMCNC: 31.7 G/DL (ref 32–36)
MCV RBC AUTO: 97 FL (ref 82–98)
MICROSCOPIC COMMENT: ABNORMAL
NITRITE UR QL STRIP: NEGATIVE
NUCLEATED RBC (/100WBC) (OHS): 0 /100 WBC
PH UR STRIP: 6 [PH]
PHOSPHATE SERPL-MCNC: 5.1 MG/DL (ref 2.7–4.5)
PLATELET # BLD AUTO: 203 K/UL (ref 150–450)
PMV BLD AUTO: 10.4 FL (ref 9.2–12.9)
POCT GLUCOSE: 107 MG/DL (ref 70–110)
POCT GLUCOSE: 130 MG/DL (ref 70–110)
POCT GLUCOSE: 134 MG/DL (ref 70–110)
POCT GLUCOSE: 190 MG/DL (ref 70–110)
POTASSIUM SERPL-SCNC: 4.1 MMOL/L (ref 3.5–5.1)
PROT UR QL STRIP: ABNORMAL
RBC # BLD AUTO: 2.97 M/UL (ref 4–5.4)
RBC #/AREA URNS AUTO: 49 /HPF (ref 0–4)
RELATIVE EOSINOPHIL (OHS): 1 %
RELATIVE LYMPHOCYTE (OHS): 7.8 % (ref 18–48)
RELATIVE MONOCYTE (OHS): 20 % (ref 4–15)
RELATIVE NEUTROPHIL (OHS): 69.6 % (ref 38–73)
SODIUM SERPL-SCNC: 134 MMOL/L (ref 136–145)
SP GR UR STRIP: 1.01
SQUAMOUS #/AREA URNS AUTO: 18 /HPF
UROBILINOGEN UR STRIP-ACNC: NEGATIVE EU/DL
WBC # BLD AUTO: 6.16 K/UL (ref 3.9–12.7)
WBC #/AREA URNS AUTO: >100 /HPF (ref 0–5)

## 2025-07-04 PROCEDURE — 87086 URINE CULTURE/COLONY COUNT: CPT | Mod: HCNC | Performed by: INTERNAL MEDICINE

## 2025-07-04 PROCEDURE — 25000003 PHARM REV CODE 250: Mod: HCNC | Performed by: INTERNAL MEDICINE

## 2025-07-04 PROCEDURE — 63600175 PHARM REV CODE 636 W HCPCS: Mod: HCNC | Performed by: INTERNAL MEDICINE

## 2025-07-04 PROCEDURE — 82040 ASSAY OF SERUM ALBUMIN: CPT | Mod: HCNC | Performed by: INTERNAL MEDICINE

## 2025-07-04 PROCEDURE — 99214 OFFICE O/P EST MOD 30 MIN: CPT | Mod: HCNC,,,

## 2025-07-04 PROCEDURE — 36415 COLL VENOUS BLD VENIPUNCTURE: CPT | Mod: HCNC | Performed by: INTERNAL MEDICINE

## 2025-07-04 PROCEDURE — 94761 N-INVAS EAR/PLS OXIMETRY MLT: CPT | Mod: HCNC

## 2025-07-04 PROCEDURE — 90945 DIALYSIS ONE EVALUATION: CPT | Mod: HCNC

## 2025-07-04 PROCEDURE — 81001 URINALYSIS AUTO W/SCOPE: CPT | Mod: HCNC | Performed by: INTERNAL MEDICINE

## 2025-07-04 PROCEDURE — G0378 HOSPITAL OBSERVATION PER HR: HCPCS | Mod: HCNC

## 2025-07-04 PROCEDURE — 85025 COMPLETE CBC W/AUTO DIFF WBC: CPT | Mod: HCNC | Performed by: INTERNAL MEDICINE

## 2025-07-04 RX ORDER — ONDANSETRON HYDROCHLORIDE 2 MG/ML
4 INJECTION, SOLUTION INTRAVENOUS EVERY 8 HOURS PRN
Status: DISCONTINUED | OUTPATIENT
Start: 2025-07-04 | End: 2025-07-05

## 2025-07-04 RX ORDER — CEFTRIAXONE 1 G/1
1 INJECTION, POWDER, FOR SOLUTION INTRAMUSCULAR; INTRAVENOUS
Status: DISCONTINUED | OUTPATIENT
Start: 2025-07-04 | End: 2025-07-08

## 2025-07-04 RX ADMIN — SODIUM BICARBONATE 650 MG TABLET 650 MG: at 09:07

## 2025-07-04 RX ADMIN — SACUBITRIL AND VALSARTAN 1 TABLET: 24; 26 TABLET, FILM COATED ORAL at 09:07

## 2025-07-04 RX ADMIN — SEVELAMER CARBONATE 2.4 G: 2400 POWDER, FOR SUSPENSION ORAL at 05:07

## 2025-07-04 RX ADMIN — CEFTRIAXONE 1 G: 1 INJECTION, POWDER, FOR SOLUTION INTRAMUSCULAR; INTRAVENOUS at 09:07

## 2025-07-04 RX ADMIN — ISOSORBIDE DINITRATE 5 MG: 5 TABLET ORAL at 09:07

## 2025-07-04 RX ADMIN — ACETAMINOPHEN 650 MG: 325 TABLET ORAL at 04:07

## 2025-07-04 RX ADMIN — SEVELAMER CARBONATE 2.4 G: 2400 POWDER, FOR SUSPENSION ORAL at 12:07

## 2025-07-04 RX ADMIN — ACETAMINOPHEN 650 MG: 325 TABLET ORAL at 09:07

## 2025-07-04 RX ADMIN — ONDANSETRON 4 MG: 2 INJECTION INTRAMUSCULAR; INTRAVENOUS at 08:07

## 2025-07-04 RX ADMIN — TRAZODONE HYDROCHLORIDE 25 MG: 50 TABLET ORAL at 09:07

## 2025-07-04 RX ADMIN — CINACALCET HYDROCHLORIDE 30 MG: 30 TABLET, FILM COATED ORAL at 05:07

## 2025-07-04 RX ADMIN — HYDROCODONE BITARTRATE AND ACETAMINOPHEN 1 TABLET: 5; 325 TABLET ORAL at 01:07

## 2025-07-04 RX ADMIN — CARVEDILOL 6.25 MG: 6.25 TABLET, FILM COATED ORAL at 09:07

## 2025-07-04 RX ADMIN — APIXABAN 2.5 MG: 2.5 TABLET, FILM COATED ORAL at 09:07

## 2025-07-04 RX ADMIN — ACETAMINOPHEN 650 MG: 325 TABLET ORAL at 12:07

## 2025-07-04 RX ADMIN — ONDANSETRON 4 MG: 2 INJECTION INTRAMUSCULAR; INTRAVENOUS at 09:07

## 2025-07-04 RX ADMIN — CEFTAZIDIME: 1 INJECTION, POWDER, FOR SOLUTION INTRAMUSCULAR; INTRAVENOUS at 05:07

## 2025-07-04 NOTE — PROGRESS NOTES
"  [facility] - Acute Medical Stepdown  Adult Nutrition  Consult Note    SUMMARY     Recommendations  1. Change diet to "Renal On-Dialysis, CCHO 2000 calories" with Novasource Renal once daily.   2. Document weekly weights and daily PO intake % in flowsheets.    Goals  1. Meet 75% EN through admission.   2. Maintain dry weight.  Nutrition Goal Status: new  Communication of RD Recs:  (POC)    Nutrition Discharge Planning     Nutrition Discharge Planning: Therapeutic diet (comments)  Therapeutic diet (comments): Renal, Diabetic      Reason for Assessment    Reason For Assessment: identified at risk by screening criteria (MST=3)  General Information Comments: Patient assessed for MST=3. Current diagnosis is Hyperkalemia. Past medical hx includes ESRD, DM2, GERD, Anxiety. Current diet is CCHO, 2000 calories.Patient was receiving care from another discipline during time of visit. Unable to obtain subjective data. PO intake is not documented at this time. Multiple questionable weights documented per chart review. Unable to complete accurate assessment of weight trend. Significant weight changes of +14.6 over 1 month and -20.3% over 1 yr. Patient currently has a normal serum K level. Na level  slightly decreased. Patient currently receives dialysis. No wounds noted at this time. LBM 7/2/25. RD team following.    Nutrition/Diet History    Spiritual, Cultural Beliefs, Mu-ism Practices, Values that Affect Care:  (Unable to assess)  Food Allergies: NKFA  Nutrition-related SDOH: Unable to assess at this time    Anthropometrics    Height: 5' 5" (165.1 cm)  Height (inches): 65 in  Height Method: Estimated  Weight: 64 kg (141 lb 1.5 oz)  Weight (lb): 141.1 lb  Weight Method: Bed Scale  Ideal Body Weight (IBW), Female: 125 lb  % Ideal Body Weight, Female (lb): 112.88 %  BMI (Calculated): 23.5       Lab/Procedures/Meds    Pertinent Labs Reviewed: reviewed  Pertinent Labs Comments: 7/4/25: H/H 9.1/28.7L, Na 134L, BUN 43H, Cre 5.2H, " eGFR 9L, Ca 8.4L, Phos 5.1H, Alb 2.9L  Pertinent Medications Reviewed: reviewed  Pertinent Medications Comments: Atorvastatin, carvedilol, clopidogrel, famotidine, furosemide, Na bicarbonate tab.    Estimated/Assessed Needs    Weight Used For Calorie Calculations: 64 kg (141 lb 1.5 oz)           Weight Used For Protein Calculations: 64 kg (141 lb 1.5 oz)                  Nutrition Prescription Ordered    Current Diet Order: [facility] 2000 calories    Evaluation of Received Nutrient/Fluid Intake    I/O: -879mL net since admit  % Intake of Estimated Energy Needs: Other: Unable to assess  % Meal Intake: Other: Unable to assess    PES Statement   (Need for therapeutic diet (Renal-On Dialysis, CCHO)) related to  (ESRD and DM) as evidenced by  (Medical hx, on dialysis.)  Status: New    Nutrition Risk    Level of Risk/Frequency of Follow-up: low - moderate       Monitor and Evaluation    Monitor and Evaluation: Food and beverage intake, Diet order, Weight, Electrolyte and renal panel, Gastrointestinal profile, Glucose/endocrine profile       Nutrition Follow-Up    RD Follow-up?: Yes

## 2025-07-04 NOTE — NURSING
"Manual PD exchange performed per orders placed by Dr. Rivas:    Order Questions    Question Answer   Peritoneal Dialysis Solution Dianeal Low Calcium   Dextrose of solution 1.5%   Medications to solution bag (continuous dosing) Yes   I have ordered the added medications in the "medications to be added to dialysate" section Yes   Exchange Volume (L) 2L   Therapy Time (hours) 12 hours   Medications to solution bag (last fill) No   Total Volume (L) 2     Pt tolerated procedure well    Primary nurse made aware that the abx will dwell for 12 hrs  "

## 2025-07-04 NOTE — ED NOTES
Pt given urine specimen cup and instructed on collection. Pt verbalizes understanding. Pt states they will press call light once sample has been provided.

## 2025-07-04 NOTE — PROGRESS NOTES
[facility] - [facility] Medical [facility] Medicine  Progress Note    Patient Name: Suyapa Connelly  MRN: [id number]  Patient Class: OP- Observation   Admission Date: 7/3/2025  Length of Stay: 0 days  Attending Physician: Randell Garcia MD  Primary Care Provider: Vanessa Noel MD        Subjective     Principal Problem:Hyperkalemia        HPI:  60 yo F with ESRD, previously on thrice-weekly HD, now transitioning to home PD and currently receiving once weekly HD, last session 1 week ago, presenting to ED with dysuria, weakness, HA, N/V. K 7.0. EKG ordered, pending. Calcium, insulin, D50, albuterol, Lokelma ordered for medical management.      reports she is more somnolent, CT of head is pending.    Overview/Hospital Course:  No notes on file    Interval History: Seems groggy this am but better, will start rocephin for possible UTI, K is 4.1, Results of head MRI noted, possible normal pressure hydrocephalus.    Review of Systems  Objective:     Vital Signs (Most Recent):  Temp: 98.6 °F (37 °C) (07/04/25 0745)  Pulse: 87 (07/04/25 0745)  Resp: 16 (07/04/25 0745)  BP: (!) 122/56 (07/04/25 0745)  SpO2: (!) 94 % (07/04/25 0745) Vital Signs (24h Range):  Temp:  [98.3 °F (36.8 °C)-98.6 °F (37 °C)] 98.6 °F (37 °C)  Pulse:  [87-98] 87  Resp:  [9-23] 16  SpO2:  [91 %-98 %] 94 %  BP: (108-168)/(53-87) 122/56     Weight: 64 kg (141 lb 1.5 oz)  Body mass index is 23.48 kg/m².    Intake/Output Summary (Last 24 hours) at 7/4/2025 0835  Last data filed at 7/4/2025 0457  Gross per 24 hour   Intake 540.91 ml   Output 1420 ml   Net -879.09 ml         Physical Exam  Constitutional:       General: She is in acute distress.      Appearance: She is ill-appearing.   HENT:      Head: Normocephalic.      Nose: Nose normal.      Mouth/Throat:      Mouth: Mucous membranes are moist.   Cardiovascular:      Rate and Rhythm: Normal rate.   Pulmonary:      Effort: Pulmonary effort is normal.   Abdominal:      General: Abdomen is  flat.   Musculoskeletal:         General: Normal range of motion.      Cervical back: Normal range of motion.   Skin:     General: Skin is warm.   Neurological:      General: No focal deficit present.               Significant Labs: All pertinent labs within the past 24 hours have been reviewed.  BMP:   Recent Labs   Lab 07/04/25  0633      *   K 4.1   CL 97   CO2 21*   BUN 43*   CREATININE 5.2*   CALCIUM 8.4*       Significant Imaging: I have reviewed all pertinent imaging results/findings within the past 24 hours.      Assessment & Plan  Hyperkalemia  Hyperkalemia is likely due to ESRD.The patients most recent potassium results are listed below.  Recent Labs     07/03/25  1233 07/04/25  0633   K 7.0* 4.1     Plan  - Monitor for arrhythmias with EKG and/or continuous telemetry.   - Treat the hyperkalemia with Potassium Binders.   - Monitor potassium: Every 12 hours  - The patient's hyperkalemia is stable  Will get                             urgent HD in ER  Nephrology consulted          S/P CABG x 1  stable    Anemia due to chronic kidney disease, on chronic dialysis  Anemia is likely due to chronic disease due to ESRD. Most recent hemoglobin and hematocrit are listed below.  Recent Labs     07/03/25  1233 07/03/25  1606 07/04/25  0633   HGB 11.4*  --  9.1*   HCT 36.9* 29* 28.7*     Plan  - Monitor serial CBC: Daily  - Transfuse PRBC if patient becomes hemodynamically unstable, symptomatic or H/H drops below 7/21.  - Patient has not received any PRBC transfusions to date  - Patient's anemia is currently stable    Paroxysmal atrial fibrillation  Patient has persistent (7 days or more) atrial fibrillation. Patient is currently in sinus rhythm. [id number] Score: 3. The patients heart rate in the last 24 hours is as follows:  Pulse  Min: 87  Max: 98     Antiarrhythmics       Anticoagulants  heparin (porcine) injection 1,000 Units, As needed (PRN), Intra-Catheter  apixaban tablet 2.5 mg, 2 times daily,  Oral    Plan  - Replete lytes with a goal of K>4, Mg >2  - Patient is anticoagulated, see medications listed above.  - Patient's afib is currently controlled      Encephalopathy  Uncertain etiology  Will check UA, PD fluid  CT of head pending    ESRD (end stage renal disease)  Creatine stable for now. BMP reviewed- noted Estimated Creatinine Clearance: 10.6 mL/min (A) (based on SCr of 5.2 mg/dL (H)). according to latest data. Based on current GFR, CKD stage is end stage.  Monitor UOP and serial BMP and adjust therapy as needed. Renally dose meds. Avoid nephrotoxic medications and procedures.  VTE Risk Mitigation (From admission, onward)           Ordered     apixaban tablet 2.5 mg  2 times daily         07/03/25 1625     IP VTE HIGH RISK PATIENT  Once         07/03/25 1620     Place sequential compression device  Until discontinued         07/03/25 1620     heparin (porcine) injection 1,000 Units  As needed (PRN)         07/03/25 1500                    Discharge Planning   DEVAN:      Code Status: Full Code   Medical Readiness for Discharge Date:                            Randell Garcia MD  Department of Hospital Medicine   [facility] - Acute Medical Stepdown

## 2025-07-04 NOTE — SUBJECTIVE & OBJECTIVE
Interval History: HD yesterday for hyperkalemia, net  cc. Electrolytes this AM stable, K improved to 4.1. Overnight, patient with worsening headache and n/v, CTH yesterday with concern for mild cerebral edema, MRI ordered. On my evaluation this afternoon, A&O x 3 and reports feeling better. PD fluid analysis reviewed, concern for peritonitis.     Review of patient's allergies indicates:   Allergen Reactions    Ciprofloxacin Itching    Pcn [penicillins]      Rash; tolerated ceftriaxone on 1/13/20  Tolerating Augmentin November 2024     Current Facility-Administered Medications   Medication Frequency    0.9% NaCl infusion PRN    0.9% NaCl infusion Once    acetaminophen tablet 650 mg Q8H PRN    allopurinoL tablet 100 mg Daily    apixaban tablet 2.5 mg BID    atorvastatin tablet 80 mg Daily    carvediloL tablet 6.25 mg BID    cefTRIAXone injection 1 g Q24H    cinacalcet tablet 30 mg Every Mon, Wed, Fri    clopidogreL tablet 75 mg Daily    dextrose 50% injection 12.5 g PRN    dextrose 50% injection 25 g PRN    famotidine tablet 20 mg Q48H    furosemide tablet 40 mg BID    glucagon (human recombinant) injection 1 mg PRN    glucose chewable tablet 16 g PRN    glucose chewable tablet 24 g PRN    heparin (porcine) injection 1,000 Units PRN    hydrALAZINE tablet 10 mg Q12H    HYDROcodone-acetaminophen 5-325 mg per tablet 1 tablet Q6H PRN    insulin aspart U-100 pen 0-5 Units QID (AC + HS) PRN    isosorbide dinitrate tablet 5 mg BID    ondansetron injection 4 mg Q8H PRN    Peritoneal Dialysis Solution with Additives Once    sacubitriL-valsartan 24-26 mg per tablet 1 tablet BID    sertraline tablet 100 mg Daily    sevelamer carbonate 2.4 gram powder 2.4 g TID WM    sodium bicarbonate tablet 650 mg BID    sodium chloride 0.9% bolus 250 mL 250 mL PRN    sodium chloride 0.9% flush 10 mL PRN    trazodone split tablet 25 mg Nightly PRN       Objective:     Vital Signs (Most Recent):  Temp: 98.2 °F (36.8 °C) (07/04/25  1210)  Pulse: 85 (07/04/25 1210)  Resp: 18 (07/04/25 1351)  BP: (!) 117/57 (07/04/25 1210)  SpO2: 100 % (07/04/25 1210) Vital Signs (24h Range):  Temp:  [98.2 °F (36.8 °C)-98.6 °F (37 °C)] 98.2 °F (36.8 °C)  Pulse:  [85-98] 85  Resp:  [9-23] 18  SpO2:  [93 %-100 %] 100 %  BP: (108-168)/(53-87) 117/57     Weight: 64 kg (141 lb 1.5 oz) (07/04/25 1204)  Body mass index is 23.48 kg/m².  Body surface area is 1.71 meters squared.    I/O last 3 completed shifts:  In: 540.9 [Other:500; IV Piggyback:40.9]  Out: 1420 [Urine:120; Other:1300]     Physical Exam  Vitals and nursing note reviewed.   HENT:      Head: Normocephalic.   Cardiovascular:      Rate and Rhythm: Normal rate.   Pulmonary:      Effort: Pulmonary effort is normal. No respiratory distress.   Abdominal:      General: There is no distension.      Tenderness: There is no abdominal tenderness.      Comments: PD catheter in place with dressing   Skin:     General: Skin is warm and dry.   Neurological:      Mental Status: She is alert and oriented to person, place, and time.          Significant Labs:  CBC:   Recent Labs   Lab 07/04/25  0633   WBC 6.16   RBC 2.97*   HGB 9.1*   HCT 28.7*      MCV 97   MCH 30.6   MCHC 31.7*     CMP:   Recent Labs   Lab 07/03/25  1233 07/04/25  0633   GLU 84 100   CALCIUM 8.8 8.4*   ALBUMIN 3.7 2.9*   PROT 9.2*  --    * 134*   K 7.0* 4.1   CO2 11* 21*   CL 96 97   * 43*   CREATININE 9.8* 5.2*   ALKPHOS 193*  --    ALT 10  --    AST 18  --    BILITOT 0.7  --      All labs within the past 24 hours have been reviewed.

## 2025-07-04 NOTE — ASSESSMENT & PLAN NOTE
>>ASSESSMENT AND PLAN FOR S/P CABG X 1 WRITTEN ON 7/4/2025  8:37 AM BY SUHAS ALVAREZ MD    stable

## 2025-07-04 NOTE — PROGRESS NOTES
07/03/25 2000   Vitals   BP (!) 168/70   Pulse 91   SpO2 96 %   Peritoneal Dialysis   Exchange Type Manual   Peritoneal Treatment Status Completed   Manual Peritoneal Dialysis   Manual Fill Volume (mL) 450 mL   Effluent Appearance Yellow   Manual Treatment Comments CAPD completed     Manual exchange completed. Able to drain 450mL of yellow effluent without issues. Disconnected from drain bag aseptically. Lines capped and secured to abdomen with paper tape. Specimen for PD labs collected and sent to lab. Report given to primary nurse.

## 2025-07-04 NOTE — ASSESSMENT & PLAN NOTE
Anemia is likely due to chronic disease due to ESRD. Most recent hemoglobin and hematocrit are listed below.  Recent Labs     07/03/25  1233 07/03/25  1606 07/04/25  0633   HGB 11.4*  --  9.1*   HCT 36.9* 29* 28.7*     Plan  - Monitor serial CBC: Daily  - Transfuse PRBC if patient becomes hemodynamically unstable, symptomatic or H/H drops below 7/21.  - Patient has not received any PRBC transfusions to date  - Patient's anemia is currently stable

## 2025-07-04 NOTE — PROGRESS NOTES
07/04/25 0521   Provider Notification   Reason for Communication Evaluate  (c/o 10/10 frontal headache over her right eye, I gave her tylenol and she probably vomited about 10 mins later. pt emesis was straight bile)   Provider Name Corey Cook MD   Provider Role Hospitalist   Method of Communication Secure Message   Response See orders  (MRI head ordered)   Notification Time 0521

## 2025-07-04 NOTE — CARE UPDATE
Notified by ER staff about CTH which showed Sulcal crowding at the apex with vague sulcal hyperattenuation, suggestive of pseudosubarachnoid hemorrhage, favoring mild cerebral edema.  MRI of the brain may be obtained for further assessment.    Reached out to the nurse to confirm her current mental status. If stable with defer further imaging to the primary team. Awaiting response from nursing staff - Rozina Sesay RN.      Update  Discussed with nursing staff who said patient is still confused. Now had an episode of vomiting..    -MRI brain wo contrast ordered.

## 2025-07-04 NOTE — ASSESSMENT & PLAN NOTE
Nephrology History  -Outpatient HD unit: [facility]  -Nephrologist:   -HD tx days: Previously MWF; now once weekly only as transitioning to PD  -Last HD tx: 6/27  -HD access: tunneled IJ cath  -HD modality: iHD/PD  -Residual urine: +  -EDW: 67kg?    - Hold HD today given concern for cerebral edema on CTH; follow up MRI results; no urgent need for RRT today, will reassess daily  - Discussed abx with attending provider for possible peritonitis; abx to be ordered per attending provider   - Reassess need for continued iHD pending morning labs  - Strict I&O, pre and post dialysis weights  - Renal diet if able to take po

## 2025-07-04 NOTE — ASSESSMENT & PLAN NOTE
Hyperkalemia is likely due to ESRD.The patients most recent potassium results are listed below.  Recent Labs     07/03/25  1233 07/04/25  0633   K 7.0* 4.1     Plan  - Monitor for arrhythmias with EKG and/or continuous telemetry.   - Treat the hyperkalemia with Potassium Binders.   - Monitor potassium: Every 12 hours  - The patient's hyperkalemia is stable  Will get                             urgent HD in ER  Nephrology consulted

## 2025-07-04 NOTE — ASSESSMENT & PLAN NOTE
Infectious vs uremic etiology vs intracranial etiology    -Urinalysis w culture pending; if positive will need antibiotics  -Peritoneal fluid culture and gram stain pending to evaluate for peritonitis, given WBC > 100 on fluid studies, concern for possible peritonitis; discussed with staff, will order abx as appropriate  -Continue to monitor symptoms

## 2025-07-04 NOTE — PLAN OF CARE
[facility] - Acute Medical Stepdown  Initial Discharge Assessment       Primary Care Provider: Vanessa Noel MD    Admission Diagnosis: Hyperkalemia [E87.5]    Admission Date: 7/3/2025  Expected Discharge Date:     Transition of Care Barriers: (P) None    Payor: HU[employer] MANAGED MEDICARE / Plan: HU[employer] MEDICARE SELECT PARTNER / Product Type: Medicare Advantage /     Extended Emergency Contact Information  Primary Emergency Contact: Randell Connelly  Address: [address] [place] [address] [address]           [place] [address] [facility]  Home Phone: [phone]  Mobile Phone: [phone]  Relation: Spouse  Secondary Emergency Contact: Viral Jaramillo  Mobile Phone: [phone]  Relation: Son  Preferred language: English   needed? No  Mother: Tea Sal   [place]  Mobile Phone: [phone]    Discharge Plan A: (P) Home with family  Discharge Plan B: (P) Home      [id number] - [facility] [address] [facility]  [address]  [facility] [address]  Phone: [phone] Fax: [phone]      Initial Assessment (most recent)       Adult Discharge Assessment - 07/04/25 1515          Discharge Assessment    Assessment Type Discharge Planning Assessment (P)      Confirmed/corrected address, phone number and insurance Yes (P)      Confirmed Demographics Correct on Facesheet (P)      Source of Information patient;family (P)      People in Home spouse (P)      Do you expect to return to your current living situation? Yes (P)      Do you have help at home or someone to help you manage your care at home? Yes (P)      Prior to hospitilization cognitive status: Alert/Oriented (P)      Current cognitive status: Alert/Oriented (P)      Walking or Climbing Stairs Difficulty yes (P)      Mobility Management pt uses electric wheelchair (P)      Home Accessibility wheelchair accessible (P)      Readmission within 30 days? No (P)       Patient currently being followed by outpatient case management? No (P)      Do you currently have service(s) that help you manage your care at home? No (P)      Do you take prescription medications? Yes (P)      Do you have prescription coverage? Yes (P)      Do you have any problems affording any of your prescribed medications? No (P)      Is the patient taking medications as prescribed? yes (P)      How do you get to doctors appointments? health plan transportation (P)      Are you on dialysis? Yes (P)      Dialysis Name and Scheduled days [facility] MWF (P)      Do you take coumadin? No (P)      Discharge Plan A Home with family (P)      Discharge Plan B Home (P)      DME Needed Upon Discharge  none (P)      Discharge Plan discussed with: Patient;Spouse/sig other (P)      Name(s) and Number(s) Randell (P)      Transition of Care Barriers None (P)         Physical Activity    On average, how many days per week do you engage in moderate to strenuous exercise (like a brisk walk)? 0 days (P)      On average, how many minutes do you engage in exercise at this level? 0 min (P)         Financial Resource Strain    How hard is it for you to pay for the very basics like food, housing, medical care, and heating? Not very hard (P)         Housing Stability    In the last 12 months, was there a time when you were not able to pay the mortgage or rent on time? No (P)      At any time in the past 12 months, were you homeless or living in a shelter (including now)? No (P)         Transportation Needs    In the past 12 months, has lack of transportation kept you from medical appointments or from getting medications? No (P)      In the past 12 months, has lack of transportation kept you from meetings, work, or from getting things needed for daily living? No (P)         Food Insecurity    Within the past 12 months, you worried that your food would run out before you got the money to buy more. Never true (P)      Within the past 12  months, the food you bought just didn't last and you didn't have money to get more. Never true (P)         Stress    Do you feel stress - tense, restless, nervous, or anxious, or unable to sleep at night because your mind is troubled all the time - these days? Not at all (P)         Social Isolation    How often do you feel lonely or isolated from those around you?  Rarely (P)         Alcohol Use    Q1: How often do you have a drink containing alcohol? Monthly or less (P)      Q2: How many drinks containing alcohol do you have on a typical day when you are drinking? Patient does not drink (P)      Q3: How often do you have six or more drinks on one occasion? Never (P)         Utilities    In the past 12 months has the electric, gas, oil, or water company threatened to shut off services in your home? No (P)         Health Literacy    How often do you need to have someone help you when you read instructions, pamphlets, or other written material from your doctor or pharmacy? Sometimes (P)                    Met with patient and spouse bedside.  Pt last seen PCP two months ago.  Pt takes eliquis and plavix.  She receives dialysis at [facility] on [facility] in [place].  She has a power/motorized wheelchair for mobility.  She does not smoke and may have a drink on special occasions.  She will need wheelchair van transport home at discharge.    Discharge Plan A and Plan B have been determined by review of patient's clinical status, future medical and therapeutic needs, and coverage/benefits for post-acute care in coordination with multidisciplinary team members.     Kylah Mujica RN, BSN  Case Management  [phone]

## 2025-07-04 NOTE — SUBJECTIVE & OBJECTIVE
Interval History: Seems groggy this am but better will start rocephin for possibel UTI, K is 4.1    Review of Systems  Objective:     Vital Signs (Most Recent):  Temp: 98.6 °F (37 °C) (07/04/25 0745)  Pulse: 87 (07/04/25 0745)  Resp: 16 (07/04/25 0745)  BP: (!) 122/56 (07/04/25 0745)  SpO2: (!) 94 % (07/04/25 0745) Vital Signs (24h Range):  Temp:  [98.3 °F (36.8 °C)-98.6 °F (37 °C)] 98.6 °F (37 °C)  Pulse:  [87-98] 87  Resp:  [9-23] 16  SpO2:  [91 %-98 %] 94 %  BP: (108-168)/(53-87) 122/56     Weight: 64 kg (141 lb 1.5 oz)  Body mass index is 23.48 kg/m².    Intake/Output Summary (Last 24 hours) at 7/4/2025 0835  Last data filed at 7/4/2025 0457  Gross per 24 hour   Intake 540.91 ml   Output 1420 ml   Net -879.09 ml         Physical Exam  Constitutional:       General: She is in acute distress.      Appearance: She is ill-appearing.   HENT:      Head: Normocephalic.      Nose: Nose normal.      Mouth/Throat:      Mouth: Mucous membranes are moist.   Cardiovascular:      Rate and Rhythm: Normal rate.   Pulmonary:      Effort: Pulmonary effort is normal.   Abdominal:      General: Abdomen is flat.   Musculoskeletal:         General: Normal range of motion.      Cervical back: Normal range of motion.   Skin:     General: Skin is warm.   Neurological:      General: No focal deficit present.               Significant Labs: All pertinent labs within the past 24 hours have been reviewed.  BMP:   Recent Labs   Lab 07/04/25  0633      *   K 4.1   CL 97   CO2 21*   BUN 43*   CREATININE 5.2*   CALCIUM 8.4*       Significant Imaging: I have reviewed all pertinent imaging results/findings within the past 24 hours.

## 2025-07-04 NOTE — ASSESSMENT & PLAN NOTE
Creatine stable for now. BMP reviewed- noted Estimated Creatinine Clearance: 10.6 mL/min (A) (based on SCr of 5.2 mg/dL (H)). according to latest data. Based on current GFR, CKD stage is end stage.  Monitor UOP and serial BMP and adjust therapy as needed. Renally dose meds. Avoid nephrotoxic medications and procedures.

## 2025-07-04 NOTE — PLAN OF CARE
Plan of care reviewed with pt and spouse. Pt only oriented to self this am when pt arrived she knew she was at the hospital but didn't know why. Pt c/o 10/10 frontal headache over her right eye, received tylenol, and then vomited after. Awaiting MRI of head this am. . No apparent distress noted. Fall precautions maintained. Pt remains free of fall or injury. Bed in lowest position, locked, call light within reach, and bed alarm on. Side rails up x's 2.     Problem: Skin Injury Risk Increased  Goal: Skin Health and Integrity  Outcome: Progressing     Problem: Adult Inpatient Plan of Care  Goal: Plan of Care Review  Outcome: Progressing  Flowsheets (Taken 7/4/2025 7430)  Plan of Care Reviewed With:   patient   spouse  Goal: Absence of Hospital-Acquired Illness or Injury  Outcome: Progressing  Goal: Optimal Comfort and Wellbeing  Outcome: Progressing     Problem: Infection  Goal: Absence of Infection Signs and Symptoms  Outcome: Progressing

## 2025-07-04 NOTE — PLAN OF CARE
"Recommendations  1. Change diet to [facility] with Novasource Renal once daily.   2. Document weekly weights and daily PO intake % in flowsheets.    Goals  1. Meet 75% EN through admission.   2. Maintain dry weight.  Nutrition Goal Status: new  "

## 2025-07-04 NOTE — ASSESSMENT & PLAN NOTE
Patient has persistent (7 days or more) atrial fibrillation. Patient is currently in sinus rhythm. [id number] Score: 3. The patients heart rate in the last 24 hours is as follows:  Pulse  Min: 87  Max: 98     Antiarrhythmics       Anticoagulants  heparin (porcine) injection 1,000 Units, As needed (PRN), Intra-Catheter  apixaban tablet 2.5 mg, 2 times daily, Oral    Plan  - Replete lytes with a goal of K>4, Mg >2  - Patient is anticoagulated, see medications listed above.  - Patient's afib is currently controlled

## 2025-07-04 NOTE — PROGRESS NOTES
Hemodialysis treatment completed. Blood returned. Dialyzed patient for 3.5  hours with net fluid removal of 800 mL. Patient tolerated treatment well.    L TDC flushed with saline and locked with heparin. Lumens capped and wrapped in sterile gauze.     Report given to primary nurse

## 2025-07-04 NOTE — PROGRESS NOTES
[facility] - [facility]  Nephrology  Progress Note    Patient Name: Suyapa Connelly  MRN: [id number]  Admission Date: 7/3/2025  Hospital Length of Stay: 0 days  Attending Provider: Randell Garcia MD   Primary Care Physician: Vanessa Noel MD  Principal Problem:Hyperkalemia    Subjective:     Interval History: HD yesterday for hyperkalemia, net  cc. Electrolytes this AM stable, K improved to 4.1. Overnight, patient with worsening headache and n/v, CTH yesterday with concern for mild cerebral edema, MRI ordered. On my evaluation this afternoon, A&O x 3 and reports feeling better. PD fluid analysis reviewed, concern for peritonitis.     Review of patient's allergies indicates:   Allergen Reactions    Ciprofloxacin Itching    Pcn [penicillins]      Rash; tolerated ceftriaxone on 1/13/20  Tolerating Augmentin November 2024     Current Facility-Administered Medications   Medication Frequency    0.9% NaCl infusion PRN    0.9% NaCl infusion Once    acetaminophen tablet 650 mg Q8H PRN    allopurinoL tablet 100 mg Daily    apixaban tablet 2.5 mg BID    atorvastatin tablet 80 mg Daily    carvediloL tablet 6.25 mg BID    cefTRIAXone injection 1 g Q24H    cinacalcet tablet 30 mg Every Mon, Wed, Fri    clopidogreL tablet 75 mg Daily    dextrose 50% injection 12.5 g PRN    dextrose 50% injection 25 g PRN    famotidine tablet 20 mg Q48H    furosemide tablet 40 mg BID    glucagon (human recombinant) injection 1 mg PRN    glucose chewable tablet 16 g PRN    glucose chewable tablet 24 g PRN    heparin (porcine) injection 1,000 Units PRN    hydrALAZINE tablet 10 mg Q12H    HYDROcodone-acetaminophen 5-325 mg per tablet 1 tablet Q6H PRN    insulin aspart U-100 pen 0-5 Units QID (AC + HS) PRN    isosorbide dinitrate tablet 5 mg BID    ondansetron injection 4 mg Q8H PRN    Peritoneal Dialysis Solution with Additives Once    sacubitriL-valsartan 24-26 mg per tablet 1 tablet BID    sertraline tablet 100 mg Daily     sevelamer carbonate 2.4 gram powder 2.4 g TID WM    sodium bicarbonate tablet 650 mg BID    sodium chloride 0.9% bolus 250 mL 250 mL PRN    sodium chloride 0.9% flush 10 mL PRN    trazodone split tablet 25 mg Nightly PRN       Objective:     Vital Signs (Most Recent):  Temp: 98.2 °F (36.8 °C) (07/04/25 1210)  Pulse: 85 (07/04/25 1210)  Resp: 18 (07/04/25 1351)  BP: (!) 117/57 (07/04/25 1210)  SpO2: 100 % (07/04/25 1210) Vital Signs (24h Range):  Temp:  [98.2 °F (36.8 °C)-98.6 °F (37 °C)] 98.2 °F (36.8 °C)  Pulse:  [85-98] 85  Resp:  [9-23] 18  SpO2:  [93 %-100 %] 100 %  BP: (108-168)/(53-87) 117/57     Weight: 64 kg (141 lb 1.5 oz) (07/04/25 1204)  Body mass index is 23.48 kg/m².  Body surface area is 1.71 meters squared.    I/O last 3 completed shifts:  In: 540.9 [Other:500; IV Piggyback:40.9]  Out: 1420 [Urine:120; Other:1300]     Physical Exam  Vitals and nursing note reviewed.   HENT:      Head: Normocephalic.   Cardiovascular:      Rate and Rhythm: Normal rate.   Pulmonary:      Effort: Pulmonary effort is normal. No respiratory distress.   Abdominal:      General: There is no distension.      Tenderness: There is no abdominal tenderness.      Comments: PD catheter in place with dressing   Skin:     General: Skin is warm and dry.   Neurological:      Mental Status: She is alert and oriented to person, place, and time.          Significant Labs:  CBC:   Recent Labs   Lab 07/04/25  0633   WBC 6.16   RBC 2.97*   HGB 9.1*   HCT 28.7*      MCV 97   MCH 30.6   MCHC 31.7*     CMP:   Recent Labs   Lab 07/03/25  1233 07/04/25  0633   GLU 84 100   CALCIUM 8.8 8.4*   ALBUMIN 3.7 2.9*   PROT 9.2*  --    * 134*   K 7.0* 4.1   CO2 11* 21*   CL 96 97   * 43*   CREATININE 9.8* 5.2*   ALKPHOS 193*  --    ALT 10  --    AST 18  --    BILITOT 0.7  --      All labs within the past 24 hours have been reviewed.     Assessment/Plan:     Neuro  Encephalopathy  Infectious vs uremic etiology vs intracranial  etiology    -Urinalysis w culture pending; if positive will need antibiotics  -Peritoneal fluid culture and gram stain pending to evaluate for peritonitis, given WBC > 100 on fluid studies, concern for possible peritonitis; discussed with staff, will order intraperitoneal abx  -Continue to monitor symptoms    Renal/  * Hyperkalemia  -resolved    ESRD (end stage renal disease)  Nephrology History  -Outpatient HD unit: [facility]  -Nephrologist:   -HD tx days: Previously MWF; now once weekly only as transitioning to PD  -Last HD tx: 6/27  -HD access: tunneled IJ cath  -HD modality: iHD/PD  -Residual urine: +  -EDW: 67kg?    - Hold HD today given concern for cerebral edema on CTH; follow up MRI results; no urgent need for RRT today, will reassess daily  - Discussed abx with attending provider for possible peritonitis; abx to be ordered per attending provider   - Reassess need for continued iHD pending morning labs  - Strict I&O, pre and post dialysis weights  - Renal diet if able to take po        Oncology  Anemia due to chronic kidney disease, on chronic dialysis  Current hemoglobin >10. Continue to trend    -Defer EPO administration at this time but will continue to assess need with HD sessions        Thank you for your consult. I will follow-up with patient. Please contact us if you have any additional questions.    Earnestine Morel PA-C  Nephrology  [facility] - [facility]

## 2025-07-05 LAB
ABSOLUTE EOSINOPHIL (OHS): 0.16 K/UL
ABSOLUTE MONOCYTE (OHS): 1.28 K/UL (ref 0.3–1)
ABSOLUTE NEUTROPHIL COUNT (OHS): 4.12 K/UL (ref 1.8–7.7)
ALBUMIN SERPL BCP-MCNC: 2.5 G/DL (ref 3.5–5.2)
ANION GAP (OHS): 13 MMOL/L (ref 8–16)
BASOPHILS # BLD AUTO: 0.06 K/UL
BASOPHILS NFR BLD AUTO: 1 %
BUN SERPL-MCNC: 48 MG/DL (ref 6–20)
CALCIUM SERPL-MCNC: 8 MG/DL (ref 8.7–10.5)
CHLORIDE SERPL-SCNC: 96 MMOL/L (ref 95–110)
CO2 SERPL-SCNC: 23 MMOL/L (ref 23–29)
CREAT SERPL-MCNC: 6.2 MG/DL (ref 0.5–1.4)
ERYTHROCYTE [DISTWIDTH] IN BLOOD BY AUTOMATED COUNT: 17.8 % (ref 11.5–14.5)
ERYTHROCYTE [SEDIMENTATION RATE] IN BLOOD BY PHOTOMETRIC METHOD: 116 MM/HR
GFR SERPLBLD CREATININE-BSD FMLA CKD-EPI: 7 ML/MIN/1.73/M2
GLUCOSE SERPL-MCNC: 148 MG/DL (ref 70–110)
HCT VFR BLD AUTO: 31.8 % (ref 37–48.5)
HGB BLD-MCNC: 10.2 GM/DL (ref 12–16)
HOLD SPECIMEN: NORMAL
IMM GRANULOCYTES # BLD AUTO: 0.03 K/UL (ref 0–0.04)
IMM GRANULOCYTES NFR BLD AUTO: 0.5 % (ref 0–0.5)
LYMPHOCYTES # BLD AUTO: 0.59 K/UL (ref 1–4.8)
MCH RBC QN AUTO: 31 PG (ref 27–31)
MCHC RBC AUTO-ENTMCNC: 32.1 G/DL (ref 32–36)
MCV RBC AUTO: 97 FL (ref 82–98)
NUCLEATED RBC (/100WBC) (OHS): 0 /100 WBC
PHOSPHATE SERPL-MCNC: 5.1 MG/DL (ref 2.7–4.5)
PLATELET # BLD AUTO: 247 K/UL (ref 150–450)
PMV BLD AUTO: 10.6 FL (ref 9.2–12.9)
POCT GLUCOSE: 134 MG/DL (ref 70–110)
POCT GLUCOSE: 150 MG/DL (ref 70–110)
POCT GLUCOSE: 162 MG/DL (ref 70–110)
POCT GLUCOSE: 171 MG/DL (ref 70–110)
POTASSIUM SERPL-SCNC: 4.3 MMOL/L (ref 3.5–5.1)
RBC # BLD AUTO: 3.29 M/UL (ref 4–5.4)
RELATIVE EOSINOPHIL (OHS): 2.6 %
RELATIVE LYMPHOCYTE (OHS): 9.5 % (ref 18–48)
RELATIVE MONOCYTE (OHS): 20.5 % (ref 4–15)
RELATIVE NEUTROPHIL (OHS): 65.9 % (ref 38–73)
SODIUM SERPL-SCNC: 132 MMOL/L (ref 136–145)
WBC # BLD AUTO: 6.24 K/UL (ref 3.9–12.7)

## 2025-07-05 PROCEDURE — 25000003 PHARM REV CODE 250: Mod: HCNC | Performed by: INTERNAL MEDICINE

## 2025-07-05 PROCEDURE — 90945 DIALYSIS ONE EVALUATION: CPT | Mod: HCNC

## 2025-07-05 PROCEDURE — 80069 RENAL FUNCTION PANEL: CPT | Mod: HCNC

## 2025-07-05 PROCEDURE — 20600001 HC STEP DOWN PRIVATE ROOM: Mod: HCNC

## 2025-07-05 PROCEDURE — 94761 N-INVAS EAR/PLS OXIMETRY MLT: CPT | Mod: HCNC

## 2025-07-05 PROCEDURE — 63600175 PHARM REV CODE 636 W HCPCS: Mod: HCNC | Performed by: INTERNAL MEDICINE

## 2025-07-05 PROCEDURE — 36415 COLL VENOUS BLD VENIPUNCTURE: CPT | Mod: HCNC

## 2025-07-05 PROCEDURE — 85652 RBC SED RATE AUTOMATED: CPT | Mod: HCNC | Performed by: INTERNAL MEDICINE

## 2025-07-05 PROCEDURE — 36415 COLL VENOUS BLD VENIPUNCTURE: CPT | Mod: HCNC | Performed by: INTERNAL MEDICINE

## 2025-07-05 PROCEDURE — 85025 COMPLETE CBC W/AUTO DIFF WBC: CPT | Mod: HCNC

## 2025-07-05 RX ORDER — ERYTHROMYCIN 5 MG/G
OINTMENT OPHTHALMIC EVERY 6 HOURS
Status: DISCONTINUED | OUTPATIENT
Start: 2025-07-05 | End: 2025-07-12 | Stop reason: HOSPADM

## 2025-07-05 RX ORDER — HYDROMORPHONE HYDROCHLORIDE 1 MG/ML
0.5 INJECTION, SOLUTION INTRAMUSCULAR; INTRAVENOUS; SUBCUTANEOUS EVERY 6 HOURS PRN
Status: DISCONTINUED | OUTPATIENT
Start: 2025-07-05 | End: 2025-07-12 | Stop reason: HOSPADM

## 2025-07-05 RX ADMIN — ACETAMINOPHEN 650 MG: 325 TABLET ORAL at 04:07

## 2025-07-05 RX ADMIN — SODIUM BICARBONATE 650 MG TABLET 650 MG: at 09:07

## 2025-07-05 RX ADMIN — ATORVASTATIN CALCIUM 80 MG: 40 TABLET, FILM COATED ORAL at 09:07

## 2025-07-05 RX ADMIN — TRAZODONE HYDROCHLORIDE 25 MG: 50 TABLET ORAL at 09:07

## 2025-07-05 RX ADMIN — HYDROMORPHONE HYDROCHLORIDE 0.5 MG: 1 INJECTION, SOLUTION INTRAMUSCULAR; INTRAVENOUS; SUBCUTANEOUS at 09:07

## 2025-07-05 RX ADMIN — ALLOPURINOL 100 MG: 100 TABLET ORAL at 09:07

## 2025-07-05 RX ADMIN — PROMETHAZINE HYDROCHLORIDE 12.5 MG: 25 INJECTION INTRAMUSCULAR; INTRAVENOUS at 05:07

## 2025-07-05 RX ADMIN — APIXABAN 2.5 MG: 2.5 TABLET, FILM COATED ORAL at 09:07

## 2025-07-05 RX ADMIN — HYDROCODONE BITARTRATE AND ACETAMINOPHEN 1 TABLET: 5; 325 TABLET ORAL at 01:07

## 2025-07-05 RX ADMIN — ONDANSETRON 4 MG: 2 INJECTION INTRAMUSCULAR; INTRAVENOUS at 09:07

## 2025-07-05 RX ADMIN — HYDROCODONE BITARTRATE AND ACETAMINOPHEN 1 TABLET: 5; 325 TABLET ORAL at 06:07

## 2025-07-05 RX ADMIN — SEVELAMER CARBONATE 2.4 G: 2400 POWDER, FOR SUSPENSION ORAL at 08:07

## 2025-07-05 RX ADMIN — FUROSEMIDE 40 MG: 40 TABLET ORAL at 09:07

## 2025-07-05 RX ADMIN — SACUBITRIL AND VALSARTAN 1 TABLET: 24; 26 TABLET, FILM COATED ORAL at 09:07

## 2025-07-05 RX ADMIN — ISOSORBIDE DINITRATE 5 MG: 5 TABLET ORAL at 09:07

## 2025-07-05 RX ADMIN — ERYTHROMYCIN: 5 OINTMENT OPHTHALMIC at 10:07

## 2025-07-05 RX ADMIN — SEVELAMER CARBONATE 2.4 G: 2400 POWDER, FOR SUSPENSION ORAL at 04:07

## 2025-07-05 RX ADMIN — SERTRALINE 100 MG: 100 TABLET, FILM COATED ORAL at 09:07

## 2025-07-05 RX ADMIN — ERYTHROMYCIN: 5 OINTMENT OPHTHALMIC at 04:07

## 2025-07-05 RX ADMIN — CEFTRIAXONE 1 G: 1 INJECTION, POWDER, FOR SOLUTION INTRAMUSCULAR; INTRAVENOUS at 09:07

## 2025-07-05 RX ADMIN — CEFTAZIDIME: 1 INJECTION, POWDER, FOR SOLUTION INTRAMUSCULAR; INTRAVENOUS at 06:07

## 2025-07-05 RX ADMIN — CLOPIDOGREL BISULFATE 75 MG: 75 TABLET, FILM COATED ORAL at 09:07

## 2025-07-05 NOTE — PLAN OF CARE
Patient had low blood pressure; therefore, Hydralazine and Lasix were held.    Tylenol administered for headache (2 doses).    Headache 7/10 no neurologic changes, Hydrocodone + Acetaminophen was given, Md notified (See chart)    Vital signs remained within normal range.    Plan of care discussed with patient and family; they verbalized understanding.

## 2025-07-05 NOTE — PROGRESS NOTES
07/05/25 0500   Peritoneal Dialysis   Exchange Type Manual   Peritoneal Treatment Status Completed   Manual Peritoneal Dialysis   Manual Drain Volume (mL) 1200 mL   Effluent Appearance Yellow   Manual Treatment Comments CAPD completed     CAPD completed. Drained 1200 mL of clear yellow effluent with no issues.

## 2025-07-05 NOTE — PROGRESS NOTES
Peritoneal Dialysis:  Manual Exchange performed using aseptic technique. Dianeal Solution 1.5% 2000 ml with ceftazidime instilled to dwell for 12 hours. Tolerated well with no complaints. Catheter capped with sterile betadine cap and secured to abdomen with paper tape.

## 2025-07-05 NOTE — ASSESSMENT & PLAN NOTE
Creatine stable for now. BMP reviewed- noted Estimated Creatinine Clearance: 8.9 mL/min (A) (based on SCr of 6.2 mg/dL (H)). according to latest data. Based on current GFR, CKD stage is end stage.  Monitor UOP and serial BMP and adjust therapy as needed. Renally dose meds. Avoid nephrotoxic medications and procedures.

## 2025-07-05 NOTE — ASSESSMENT & PLAN NOTE
>>ASSESSMENT AND PLAN FOR S/P CABG X 1 WRITTEN ON 7/5/2025  9:04 AM BY SUHAS ALVAREZ MD    stable

## 2025-07-05 NOTE — NURSING
Patient is complaining of headache and intermittent nausea. Pain located on right side behind eye, patient describing pain as throbbing and aching, rating pain 10 out of 10. Pain causing patient to tear up and become anxious. PO Tylenol and Norco given by previous nurse, no other pain medication available at this time. Message sent to Dr. Garcia about pain and symptoms. New orders received for IV Dilaudid q6 PRN. Zofran administered for nausea with minimal relief.

## 2025-07-05 NOTE — ASSESSMENT & PLAN NOTE
Patient has persistent (7 days or more) atrial fibrillation. Patient is currently in sinus rhythm. [id number] Score: 3. The patients heart rate in the last 24 hours is as follows:  Pulse  Min: 80  Max: 88     Antiarrhythmics       Anticoagulants  apixaban tablet 2.5 mg, 2 times daily, Oral    Plan  - Replete lytes with a goal of K>4, Mg >2  - Patient is anticoagulated, see medications listed above.  - Patient's afib is currently controlled

## 2025-07-05 NOTE — ASSESSMENT & PLAN NOTE
Hyperkalemia is likely due to ESRD.The patients most recent potassium results are listed below.  Recent Labs     07/03/25  1233 07/04/25  0633 07/05/25  0814   K 7.0* 4.1 4.3     Plan  - Monitor for arrhythmias with EKG and/or continuous telemetry.   - Treat the hyperkalemia with Potassium Binders.   - Monitor potassium: Every 12 hours  - The patient's hyperkalemia is stable  Will get                             urgent HD in ER  Nephrology consulted

## 2025-07-05 NOTE — SUBJECTIVE & OBJECTIVE
Interval History: MS is improving for pPD today, on rocephin.    Review of Systems  Objective:     Vital Signs (Most Recent):  Temp: 98.4 °F (36.9 °C) (07/05/25 0813)  Pulse: 88 (07/05/25 0813)  Resp: 16 (07/05/25 0813)  BP: (!) 114/56 (07/05/25 0813)  SpO2: 100 % (07/05/25 0813) Vital Signs (24h Range):  Temp:  [98.2 °F (36.8 °C)-98.5 °F (36.9 °C)] 98.4 °F (36.9 °C)  Pulse:  [80-88] 88  Resp:  [16-18] 16  SpO2:  [97 %-100 %] 100 %  BP: ()/(51-83) 114/56     Weight: 64 kg (141 lb 1.5 oz)  Body mass index is 23.48 kg/m².    Intake/Output Summary (Last 24 hours) at 7/5/2025 0903  Last data filed at 7/4/2025 1905  Gross per 24 hour   Intake 970 ml   Output 160 ml   Net 810 ml         Physical Exam  Constitutional:       General: She is in acute distress.      Appearance: She is ill-appearing.   HENT:      Head: Normocephalic.      Nose: Nose normal.      Mouth/Throat:      Mouth: Mucous membranes are moist.   Cardiovascular:      Rate and Rhythm: Normal rate.   Pulmonary:      Effort: Pulmonary effort is normal.   Abdominal:      General: Abdomen is flat.   Musculoskeletal:         General: Normal range of motion.      Cervical back: Normal range of motion.   Skin:     General: Skin is warm.   Neurological:      General: No focal deficit present.               Significant Labs: All pertinent labs within the past 24 hours have been reviewed.  CBC:   Recent Labs   Lab 07/03/25  1233 07/03/25  1606 07/04/25  0633 07/05/25  0814   WBC 6.99  --  6.16 6.24   HGB 11.4*  --  9.1* 10.2*   HCT 36.9* 29* 28.7* 31.8*     --  203 247       Significant Imaging: I have reviewed all pertinent imaging results/findings within the past 24 hours.

## 2025-07-05 NOTE — PROGRESS NOTES
[facility] - [facility] Medicine  Progress Note    Patient Name: Suyapa Connelly  MRN: [id number]  Patient Class: OP- Observation   Admission Date: 7/3/2025  Length of Stay: 0 days  Attending Physician: Randell Garcia MD  Primary Care Provider: Vanessa Noel MD        Subjective     Principal Problem:Hyperkalemia        HPI:  60 yo F with ESRD, previously on thrice-weekly HD, now transitioning to home PD and currently receiving once weekly HD, last session 1 week ago, presenting to ED with dysuria, weakness, HA, N/V. K 7.0. EKG ordered, pending. Calcium, insulin, D50, albuterol, Lokelma ordered for medical management.      reports she is more somnolent, CT of head is pending.    Overview/Hospital Course:  No notes on file    Interval History: MS is improving for pPD today, on rocephin.    Review of Systems  Objective:     Vital Signs (Most Recent):  Temp: 98.4 °F (36.9 °C) (07/05/25 0813)  Pulse: 88 (07/05/25 0813)  Resp: 16 (07/05/25 0813)  BP: (!) 114/56 (07/05/25 0813)  SpO2: 100 % (07/05/25 0813) Vital Signs (24h Range):  Temp:  [98.2 °F (36.8 °C)-98.5 °F (36.9 °C)] 98.4 °F (36.9 °C)  Pulse:  [80-88] 88  Resp:  [16-18] 16  SpO2:  [97 %-100 %] 100 %  BP: ()/(51-83) 114/56     Weight: 64 kg (141 lb 1.5 oz)  Body mass index is 23.48 kg/m².    Intake/Output Summary (Last 24 hours) at 7/5/2025 0903  Last data filed at 7/4/2025 1905  Gross per 24 hour   Intake 970 ml   Output 160 ml   Net 810 ml         Physical Exam  Constitutional:       General: She is in acute distress.      Appearance: She is ill-appearing.   HENT:      Head: Normocephalic.      Nose: Nose normal.      Mouth/Throat:      Mouth: Mucous membranes are moist.   Cardiovascular:      Rate and Rhythm: Normal rate.   Pulmonary:      Effort: Pulmonary effort is normal.   Abdominal:      General: Abdomen is flat.   Musculoskeletal:         General: Normal range of motion.      Cervical back: Normal range of motion.    Skin:     General: Skin is warm.   Neurological:      General: No focal deficit present.               Significant Labs: All pertinent labs within the past 24 hours have been reviewed.  CBC:   Recent Labs   Lab 07/03/25  1233 07/03/25  1606 07/04/25  0633 07/05/25  0814   WBC 6.99  --  6.16 6.24   HGB 11.4*  --  9.1* 10.2*   HCT 36.9* 29* 28.7* 31.8*     --  203 247       Significant Imaging: I have reviewed all pertinent imaging results/findings within the past 24 hours.      Assessment & Plan  Hyperkalemia  Hyperkalemia is likely due to ESRD.The patients most recent potassium results are listed below.  Recent Labs     07/03/25  1233 07/04/25  0633 07/05/25  0814   K 7.0* 4.1 4.3     Plan  - Monitor for arrhythmias with EKG and/or continuous telemetry.   - Treat the hyperkalemia with Potassium Binders.   - Monitor potassium: Every 12 hours  - The patient's hyperkalemia is stable  Will get                             urgent HD in ER  Nephrology consulted          S/P CABG x 1  stable    Anemia due to chronic kidney disease, on chronic dialysis  Anemia is likely due to chronic disease due to ESRD. Most recent hemoglobin and hematocrit are listed below.  Recent Labs     07/03/25  1233 07/03/25  1606 07/04/25  0633 07/05/25  0814   HGB 11.4*  --  9.1* 10.2*   HCT 36.9* 29* 28.7* 31.8*     Plan  - Monitor serial CBC: Daily  - Transfuse PRBC if patient becomes hemodynamically unstable, symptomatic or H/H drops below 7/21.  - Patient has not received any PRBC transfusions to date  - Patient's anemia is currently stable    Paroxysmal atrial fibrillation  Patient has persistent (7 days or more) atrial fibrillation. Patient is currently in sinus rhythm. [id number] Score: 3. The patients heart rate in the last 24 hours is as follows:  Pulse  Min: 80  Max: 88     Antiarrhythmics       Anticoagulants  apixaban tablet 2.5 mg, 2 times daily, Oral    Plan  - Replete lytes with a goal of K>4, Mg >2  - Patient is  anticoagulated, see medications listed above.  - Patient's afib is currently controlled      Encephalopathy  Uncertain etiology  Will check UA, PD fluid  CT of head pending    ESRD (end stage renal disease)  Creatine stable for now. BMP reviewed- noted Estimated Creatinine Clearance: 8.9 mL/min (A) (based on SCr of 6.2 mg/dL (H)). according to latest data. Based on current GFR, CKD stage is end stage.  Monitor UOP and serial BMP and adjust therapy as needed. Renally dose meds. Avoid nephrotoxic medications and procedures.  VTE Risk Mitigation (From admission, onward)           Ordered     apixaban tablet 2.5 mg  2 times daily         07/03/25 1625     IP VTE HIGH RISK PATIENT  Once         07/03/25 1620     Place sequential compression device  Until discontinued         07/03/25 1620     heparin (porcine) injection 1,000 Units  As needed (PRN)         07/03/25 1500                    Discharge Planning   DEVAN:      Code Status: Full Code   Medical Readiness for Discharge Date:   Discharge Plan A: Home with family                        Randell Garcia MD  Department of Hospital Medicine   [facility] - Acute Medical Stepdown

## 2025-07-05 NOTE — ASSESSMENT & PLAN NOTE
Anemia is likely due to chronic disease due to ESRD. Most recent hemoglobin and hematocrit are listed below.  Recent Labs     07/03/25  1233 07/03/25  1606 07/04/25  0633 07/05/25  0814   HGB 11.4*  --  9.1* 10.2*   HCT 36.9* 29* 28.7* 31.8*     Plan  - Monitor serial CBC: Daily  - Transfuse PRBC if patient becomes hemodynamically unstable, symptomatic or H/H drops below 7/21.  - Patient has not received any PRBC transfusions to date  - Patient's anemia is currently stable

## 2025-07-06 LAB
HOLD SPECIMEN: NORMAL
POCT GLUCOSE: 116 MG/DL (ref 70–110)
POCT GLUCOSE: 118 MG/DL (ref 70–110)
POCT GLUCOSE: 123 MG/DL (ref 70–110)
POCT GLUCOSE: 132 MG/DL (ref 70–110)

## 2025-07-06 PROCEDURE — 63600175 PHARM REV CODE 636 W HCPCS: Mod: HCNC | Performed by: INTERNAL MEDICINE

## 2025-07-06 PROCEDURE — 94761 N-INVAS EAR/PLS OXIMETRY MLT: CPT | Mod: HCNC

## 2025-07-06 PROCEDURE — 20600001 HC STEP DOWN PRIVATE ROOM: Mod: HCNC

## 2025-07-06 PROCEDURE — 27200950 HC CAPD SUPPORT: Mod: HCNC

## 2025-07-06 PROCEDURE — 25000003 PHARM REV CODE 250: Mod: HCNC | Performed by: INTERNAL MEDICINE

## 2025-07-06 PROCEDURE — 90945 DIALYSIS ONE EVALUATION: CPT | Mod: HCNC

## 2025-07-06 RX ORDER — OXYCODONE HYDROCHLORIDE 5 MG/1
5 TABLET ORAL EVERY 6 HOURS PRN
Refills: 0 | Status: DISCONTINUED | OUTPATIENT
Start: 2025-07-06 | End: 2025-07-12 | Stop reason: HOSPADM

## 2025-07-06 RX ADMIN — ERYTHROMYCIN: 5 OINTMENT OPHTHALMIC at 11:07

## 2025-07-06 RX ADMIN — ERYTHROMYCIN: 5 OINTMENT OPHTHALMIC at 07:07

## 2025-07-06 RX ADMIN — FUROSEMIDE 40 MG: 40 TABLET ORAL at 09:07

## 2025-07-06 RX ADMIN — PROMETHAZINE HYDROCHLORIDE 12.5 MG: 25 INJECTION INTRAMUSCULAR; INTRAVENOUS at 01:07

## 2025-07-06 RX ADMIN — FAMOTIDINE 20 MG: 20 TABLET, FILM COATED ORAL at 09:07

## 2025-07-06 RX ADMIN — APIXABAN 2.5 MG: 2.5 TABLET, FILM COATED ORAL at 09:07

## 2025-07-06 RX ADMIN — SODIUM BICARBONATE 650 MG TABLET 650 MG: at 09:07

## 2025-07-06 RX ADMIN — CARVEDILOL 6.25 MG: 6.25 TABLET, FILM COATED ORAL at 09:07

## 2025-07-06 RX ADMIN — ERYTHROMYCIN: 5 OINTMENT OPHTHALMIC at 06:07

## 2025-07-06 RX ADMIN — CLOPIDOGREL BISULFATE 75 MG: 75 TABLET, FILM COATED ORAL at 09:07

## 2025-07-06 RX ADMIN — SERTRALINE 100 MG: 100 TABLET, FILM COATED ORAL at 09:07

## 2025-07-06 RX ADMIN — CEFTAZIDIME: 1 INJECTION, POWDER, FOR SOLUTION INTRAMUSCULAR; INTRAVENOUS at 08:07

## 2025-07-06 RX ADMIN — ATORVASTATIN CALCIUM 80 MG: 40 TABLET, FILM COATED ORAL at 09:07

## 2025-07-06 RX ADMIN — OXYCODONE 5 MG: 5 TABLET ORAL at 10:07

## 2025-07-06 RX ADMIN — CEFTRIAXONE 1 G: 1 INJECTION, POWDER, FOR SOLUTION INTRAMUSCULAR; INTRAVENOUS at 09:07

## 2025-07-06 RX ADMIN — TRAZODONE HYDROCHLORIDE 25 MG: 50 TABLET ORAL at 09:07

## 2025-07-06 NOTE — PLAN OF CARE
Problem: Skin Injury Risk Increased  Goal: Skin Health and Integrity  Outcome: Progressing     Problem: Hemodialysis  Goal: Safe, Effective Therapy Delivery  Outcome: Progressing  Goal: Effective Tissue Perfusion  Outcome: Progressing  Goal: Absence of Infection Signs and Symptoms  Outcome: Progressing     Problem: Adult Inpatient Plan of Care  Goal: Plan of Care Review  Outcome: Progressing  Goal: Patient-Specific Goal (Individualized)  Outcome: Progressing  Goal: Absence of Hospital-Acquired Illness or Injury  Outcome: Progressing  Goal: Optimal Comfort and Wellbeing  Outcome: Progressing  Goal: Readiness for Transition of Care  Outcome: Progressing     Problem: Diabetes Comorbidity  Goal: Blood Glucose Level Within Targeted Range  Outcome: Progressing     Problem: Sepsis/Septic Shock  Goal: Optimal Coping  Outcome: Progressing  Goal: Absence of Bleeding  Outcome: Progressing  Goal: Blood Glucose Level Within Targeted Range  Outcome: Progressing  Goal: Absence of Infection Signs and Symptoms  Outcome: Progressing  Goal: Optimal Nutrition Intake  Outcome: Progressing     Problem: Infection  Goal: Absence of Infection Signs and Symptoms  Outcome: Progressing     Problem: Fall Injury Risk  Goal: Absence of Fall and Fall-Related Injury  Outcome: Progressing

## 2025-07-06 NOTE — PROGRESS NOTES
07/06/25 0619        Peritoneal Dialysis Catheter Mid lower abdomen   No placement date or time found.   Present Prior to Hospital Arrival?: Yes  Catheter Location: Mid lower abdomen   Site Assessment Clean;Dry;Intact   Dressing Intervention Integrity maintained   Status Accessed   Dressing Status Clean;Dry;Intact   Dressing Gauze   Securement secured to abdomen with tape   Clamp Status clamped   Peritoneal Dialysis   Exchange Type Manual   Peritoneal Treatment Status Completed   Dianeal Solution Dextrose 1.5% in 2000 mL   Dianeal Antibiotic antibiotics drained   Manual Peritoneal Dialysis   Manual Drain Volume (mL) 1800 mL   Effluent Appearance Yellow   Manual Treatment Comments drain antibiotics     Manual drain completed per MD order.

## 2025-07-06 NOTE — ASSESSMENT & PLAN NOTE
Patient has persistent (7 days or more) atrial fibrillation. Patient is currently in sinus rhythm. [id number] Score: 3. The patients heart rate in the last 24 hours is as follows:  Pulse  Min: 79  Max: 87     Antiarrhythmics       Anticoagulants  apixaban tablet 2.5 mg, 2 times daily, Oral    Plan  - Replete lytes with a goal of K>4, Mg >2  - Patient is anticoagulated, see medications listed above.  - Patient's afib is currently controlled

## 2025-07-06 NOTE — PLAN OF CARE
No acute events during shift. Pain med changed from norco to oxy per patient request, patient reported she did not get pain relief from norco. Morning hypertension medicine held per Dr Garcia, patient BP has been soft. Wedge in use for turning. Safety precautions in place. Spouse at bedside. Plan of care reviewed with patient and family, verbalized understanding.     Problem: Skin Injury Risk Increased  Goal: Skin Health and Integrity  Outcome: Ongoing     Problem: Hemodialysis  Goal: Safe, Effective Therapy Delivery  Outcome: Ongoing  Goal: Effective Tissue Perfusion  Outcome: Ongoing  Goal: Absence of Infection Signs and Symptoms  Outcome: Ongoing     Problem: Adult Inpatient Plan of Care  Goal: Plan of Care Review  Outcome: Ongoing  Goal: Patient-Specific Goal (Individualized)  Outcome: Ongoing  Goal: Absence of Hospital-Acquired Illness or Injury  Outcome: Ongoing  Goal: Optimal Comfort and Wellbeing  Outcome: Ongoing  Goal: Readiness for Transition of Care  Outcome: Ongoing     Problem: Diabetes Comorbidity  Goal: Blood Glucose Level Within Targeted Range  Outcome: Ongoing     Problem: Sepsis/Septic Shock  Goal: Optimal Coping  Outcome: Ongoing  Goal: Absence of Bleeding  Outcome: Ongoing  Goal: Blood Glucose Level Within Targeted Range  Outcome: Ongoing  Goal: Absence of Infection Signs and Symptoms  Outcome: Ongoing  Goal: Optimal Nutrition Intake  Outcome: Ongoing     Problem: Infection  Goal: Absence of Infection Signs and Symptoms  Outcome: Ongoing     Problem: Fall Injury Risk  Goal: Absence of Fall and Fall-Related Injury  Outcome: Ongoing     Problem: Wound  Goal: Optimal Coping  Outcome: Ongoing  Goal: Optimal Functional Ability  Outcome: Ongoing  Goal: Absence of Infection Signs and Symptoms  Outcome: Ongoing  Goal: Improved Oral Intake  Outcome: Ongoing  Goal: Optimal Pain Control and Function  Outcome: Ongoing  Goal: Skin Health and Integrity  Outcome: Ongoing  Goal: Optimal Wound Healing  Outcome:  Ongoing

## 2025-07-06 NOTE — PROGRESS NOTES
[facility]rade - [facility] Medicine  Progress Note    Patient Name: Suyapa Connelly  MRN: [id number]  Patient Class: IP- Inpatient   Admission Date: 7/3/2025  Length of Stay: 1 days  Attending Physician: Randell Garcia MD  Primary Care Provider: Vanessa Noel MD        Subjective     Principal Problem:Hyperkalemia        HPI:  58 yo F with ESRD, previously on thrice-weekly HD, now transitioning to home PD and currently receiving once weekly HD, last session 1 week ago, presenting to ED with dysuria, weakness, HA, N/V. K 7.0. EKG ordered, pending. Calcium, insulin, D50, albuterol, Lokelma ordered for medical management.      reports she is more somnolent, CT of head is pending.    Overview/Hospital Course:  No notes on file    Interval History: Eye feels better, No temporal; tenderness, ESR elevated    Review of Systems  Objective:     Vital Signs (Most Recent):  Temp: 97.6 °F (36.4 °C) (07/06/25 0830)  Pulse: 80 (07/06/25 0830)  Resp: 19 (07/06/25 0830)  BP: (!) 110/55 (07/06/25 0917)  SpO2: 100 % (07/06/25 0830) Vital Signs (24h Range):  Temp:  [97.1 °F (36.2 °C)-98.6 °F (37 °C)] 97.6 °F (36.4 °C)  Pulse:  [79-87] 80  Resp:  [10-19] 19  SpO2:  [95 %-100 %] 100 %  BP: ()/(50-56) 110/55     Weight: 64 kg (141 lb 1.5 oz)  Body mass index is 23.48 kg/m².    Intake/Output Summary (Last 24 hours) at 7/6/2025 0998  Last data filed at 7/6/2025 0435  Gross per 24 hour   Intake 300 ml   Output 0 ml   Net 300 ml         Physical Exam  Constitutional:       General: She is in acute distress.      Appearance: She is ill-appearing.   HENT:      Head: Normocephalic.      Nose: Nose normal.      Mouth/Throat:      Mouth: Mucous membranes are moist.   Cardiovascular:      Rate and Rhythm: Normal rate.   Pulmonary:      Effort: Pulmonary effort is normal.   Abdominal:      General: Abdomen is flat.   Musculoskeletal:         General: Normal range of motion.      Cervical back: Normal range of  motion.   Skin:     General: Skin is warm.   Neurological:      General: No focal deficit present.               Significant Labs: All pertinent labs within the past 24 hours have been reviewed.  CMP:   Recent Labs   Lab 07/05/25  0814   *   K 4.3   CL 96   CO2 23   *   BUN 48*   CREATININE 6.2*   CALCIUM 8.0*   ALBUMIN 2.5*   ANIONGAP 13       Significant Imaging: I have reviewed all pertinent imaging results/findings within the past 24 hours.      Assessment & Plan  Hyperkalemia  Hyperkalemia is likely due to ESRD.The patients most recent potassium results are listed below.  Recent Labs     07/03/25  1233 07/04/25  0633 07/05/25  0814   K 7.0* 4.1 4.3     Plan  - Monitor for arrhythmias with EKG and/or continuous telemetry.   - Treat the hyperkalemia with Potassium Binders.   - Monitor potassium: Every 12 hours  - The patient's hyperkalemia is stable  Will get                             urgent HD in ER  Nephrology consulted          S/P CABG x 1  stable    Anemia due to chronic kidney disease, on chronic dialysis  Anemia is likely due to chronic disease due to ESRD. Most recent hemoglobin and hematocrit are listed below.  Recent Labs     07/03/25  1233 07/03/25  1606 07/04/25  0633 07/05/25  0814   HGB 11.4*  --  9.1* 10.2*   HCT 36.9* 29* 28.7* 31.8*     Plan  - Monitor serial CBC: Daily  - Transfuse PRBC if patient becomes hemodynamically unstable, symptomatic or H/H drops below 7/21.  - Patient has not received any PRBC transfusions to date  - Patient's anemia is currently stable    Paroxysmal atrial fibrillation  Patient has persistent (7 days or more) atrial fibrillation. Patient is currently in sinus rhythm. [id number] Score: 3. The patients heart rate in the last 24 hours is as follows:  Pulse  Min: 79  Max: 87     Antiarrhythmics       Anticoagulants  apixaban tablet 2.5 mg, 2 times daily, Oral    Plan  - Replete lytes with a goal of K>4, Mg >2  - Patient is anticoagulated, see medications  listed above.  - Patient's afib is currently controlled      Encephalopathy  Uncertain etiology  Will check UA, PD fluid  CT of head pending    ESRD (end stage renal disease)  Creatine stable for now. BMP reviewed- noted Estimated Creatinine Clearance: 8.9 mL/min (A) (based on SCr of 6.2 mg/dL (H)). according to latest data. Based on current GFR, CKD stage is end stage.  Monitor UOP and serial BMP and adjust therapy as needed. Renally dose meds. Avoid nephrotoxic medications and procedures.  VTE Risk Mitigation (From admission, onward)           Ordered     apixaban tablet 2.5 mg  2 times daily         07/03/25 1625     IP VTE HIGH RISK PATIENT  Once         07/03/25 1620     Place sequential compression device  Until discontinued         07/03/25 1620     heparin (porcine) injection 1,000 Units  As needed (PRN)         07/03/25 1500                    Discharge Planning   DEVAN:      Code Status: Full Code   Medical Readiness for Discharge Date:   Discharge Plan A: Home with family                        Randell Garcia MD  Department of Hospital Medicine   [facility] - Acute Medical Stepdown

## 2025-07-06 NOTE — CONSULTS
[facility] VASCULAR ACCESS NOTE       Bed:83424/93066 A    HARSHA consulted for Peripheral Access.     22G x 1.75IN PIV placed in Left Forearm by HARSHA using Ultrasound Guidance.    Indication: PVA  Attempts: 1      Recommended Care and Maintenance:  Rotate site based on clinical indication or when catheter related complication is suspected.  Dressing Change every 7 days or if dressing is not intact, loose, moist or blood is present.  Flush and Lock every 12 hours (once per shift if not in use) and PRN before and after each use.          Kassandra Ontiveros RN

## 2025-07-06 NOTE — CARE UPDATE
Intake/Output Summary (Last 24 hours) at 7/6/2025 0737  Last data filed at 7/6/2025 0435  Gross per 24 hour   Intake 420 ml   Output 0 ml   Net 420 ml       Vitals:    07/05/25 2259 07/05/25 2326 07/05/25 2354 07/06/25 0435   BP: (!) 112/53  (!) 115/50 (!) 110/50   BP Location:   Left arm Left arm   Patient Position:   Lying Lying   Pulse: 83 87 87 79   Resp: 16  18 18   Temp:   97.8 °F (36.6 °C) 97.4 °F (36.3 °C)   TempSrc:   Temporal Temporal   SpO2: 95%  100% 100%   Weight:       Height:           Recent Labs   Lab 07/03/25  1233 07/04/25  0633 07/05/25  0814   * 134* 132*   K 7.0* 4.1 4.3   CL 96 97 96   CO2 11* 21* 23   * 43* 48*   CREATININE 9.8* 5.2* 6.2*   CALCIUM 8.8 8.4* 8.0*   PHOS  --  5.1* 5.1*     Labs reviewed. IP antibiotics ordered for tonight.     No other related issues identified. Please call Nephrology as needed; We will continue to follow.

## 2025-07-06 NOTE — ASSESSMENT & PLAN NOTE
>>ASSESSMENT AND PLAN FOR S/P CABG X 1 WRITTEN ON 7/6/2025  9:38 AM BY SUHAS ALVAREZ MD    stable

## 2025-07-06 NOTE — SUBJECTIVE & OBJECTIVE
Interval History: Eye feels better, No temporal; tenderness, ESR elevated    Review of Systems  Objective:     Vital Signs (Most Recent):  Temp: 97.6 °F (36.4 °C) (07/06/25 0830)  Pulse: 80 (07/06/25 0830)  Resp: 19 (07/06/25 0830)  BP: (!) 110/55 (07/06/25 0917)  SpO2: 100 % (07/06/25 0830) Vital Signs (24h Range):  Temp:  [97.1 °F (36.2 °C)-98.6 °F (37 °C)] 97.6 °F (36.4 °C)  Pulse:  [79-87] 80  Resp:  [10-19] 19  SpO2:  [95 %-100 %] 100 %  BP: ()/(50-56) 110/55     Weight: 64 kg (141 lb 1.5 oz)  Body mass index is 23.48 kg/m².    Intake/Output Summary (Last 24 hours) at 7/6/2025 0972  Last data filed at 7/6/2025 0435  Gross per 24 hour   Intake 300 ml   Output 0 ml   Net 300 ml         Physical Exam  Constitutional:       General: She is in acute distress.      Appearance: She is ill-appearing.   HENT:      Head: Normocephalic.      Nose: Nose normal.      Mouth/Throat:      Mouth: Mucous membranes are moist.   Cardiovascular:      Rate and Rhythm: Normal rate.   Pulmonary:      Effort: Pulmonary effort is normal.   Abdominal:      General: Abdomen is flat.   Musculoskeletal:         General: Normal range of motion.      Cervical back: Normal range of motion.   Skin:     General: Skin is warm.   Neurological:      General: No focal deficit present.               Significant Labs: All pertinent labs within the past 24 hours have been reviewed.  CMP:   Recent Labs   Lab 07/05/25  0814   *   K 4.3   CL 96   CO2 23   *   BUN 48*   CREATININE 6.2*   CALCIUM 8.0*   ALBUMIN 2.5*   ANIONGAP 13       Significant Imaging: I have reviewed all pertinent imaging results/findings within the past 24 hours.

## 2025-07-07 PROBLEM — E44.0 MODERATE MALNUTRITION: Status: ACTIVE | Noted: 2025-07-07

## 2025-07-07 LAB
ALBUMIN SERPL BCP-MCNC: 2.2 G/DL (ref 3.5–5.2)
ANION GAP (OHS): 13 MMOL/L (ref 8–16)
APPEARANCE FLD: CLEAR
BACTERIA SPEC AEROBE CULT: NO GROWTH
BACTERIA UR CULT: ABNORMAL
BASOPHILS NFR FLD MANUAL: 1 %
BUN SERPL-MCNC: 46 MG/DL (ref 6–20)
CALCIUM SERPL-MCNC: 7.7 MG/DL (ref 8.7–10.5)
CHLORIDE SERPL-SCNC: 95 MMOL/L (ref 95–110)
CO2 SERPL-SCNC: 25 MMOL/L (ref 23–29)
COLOR FLD: COLORLESS
CREAT SERPL-MCNC: 6.9 MG/DL (ref 0.5–1.4)
EOSINOPHIL NFR FLD MANUAL: 1 %
GFR SERPLBLD CREATININE-BSD FMLA CKD-EPI: 6 ML/MIN/1.73/M2
GLUCOSE SERPL-MCNC: 122 MG/DL (ref 70–110)
GRAM STN SPEC: NORMAL
GRAM STN SPEC: NORMAL
LYMPHOCYTES NFR FLD MANUAL: 25 %
MESOTHL CELL NFR FLD MANUAL: 21 %
MONOS+MACROS NFR FLD MANUAL: 51 %
NEUTROPHILS NFR FLD MANUAL: 1 %
PHOSPHATE SERPL-MCNC: 4.8 MG/DL (ref 2.7–4.5)
POCT GLUCOSE: 147 MG/DL (ref 70–110)
POCT GLUCOSE: 172 MG/DL (ref 70–110)
POCT GLUCOSE: 184 MG/DL (ref 70–110)
POTASSIUM SERPL-SCNC: 3.8 MMOL/L (ref 3.5–5.1)
SODIUM SERPL-SCNC: 133 MMOL/L (ref 136–145)
WBC # FLD: 16 /CU MM

## 2025-07-07 PROCEDURE — 88305 TISSUE EXAM BY PATHOLOGIST: CPT | Mod: 26,HCNC,, | Performed by: PATHOLOGY

## 2025-07-07 PROCEDURE — 80100016 HC MAINTENANCE HEMODIALYSIS: Mod: HCNC

## 2025-07-07 PROCEDURE — 94761 N-INVAS EAR/PLS OXIMETRY MLT: CPT | Mod: HCNC

## 2025-07-07 PROCEDURE — 20600001 HC STEP DOWN PRIVATE ROOM: Mod: HCNC

## 2025-07-07 PROCEDURE — 82040 ASSAY OF SERUM ALBUMIN: CPT | Mod: HCNC | Performed by: INTERNAL MEDICINE

## 2025-07-07 PROCEDURE — 87070 CULTURE OTHR SPECIMN AEROBIC: CPT | Mod: HCNC | Performed by: INTERNAL MEDICINE

## 2025-07-07 PROCEDURE — 87205 SMEAR GRAM STAIN: CPT | Mod: HCNC | Performed by: INTERNAL MEDICINE

## 2025-07-07 PROCEDURE — 88112 CYTOPATH CELL ENHANCE TECH: CPT | Mod: TC,HCNC | Performed by: INTERNAL MEDICINE

## 2025-07-07 PROCEDURE — 63700000 PHARM REV CODE 250 ALT 637 W/O HCPCS: Mod: HCNC | Performed by: INTERNAL MEDICINE

## 2025-07-07 PROCEDURE — 63600175 PHARM REV CODE 636 W HCPCS: Mod: HCNC | Performed by: INTERNAL MEDICINE

## 2025-07-07 PROCEDURE — 25000003 PHARM REV CODE 250: Mod: HCNC | Performed by: INTERNAL MEDICINE

## 2025-07-07 PROCEDURE — 89051 BODY FLUID CELL COUNT: CPT | Mod: HCNC | Performed by: STUDENT IN AN ORGANIZED HEALTH CARE EDUCATION/TRAINING PROGRAM

## 2025-07-07 PROCEDURE — 90945 DIALYSIS ONE EVALUATION: CPT | Mod: HCNC

## 2025-07-07 PROCEDURE — 88112 CYTOPATH CELL ENHANCE TECH: CPT | Mod: 26,HCNC,, | Performed by: PATHOLOGY

## 2025-07-07 PROCEDURE — 27200950 HC CAPD SUPPORT: Mod: HCNC

## 2025-07-07 PROCEDURE — 27000207 HC ISOLATION: Mod: HCNC

## 2025-07-07 PROCEDURE — 99233 SBSQ HOSP IP/OBS HIGH 50: CPT | Mod: HCNC,GC,, | Performed by: STUDENT IN AN ORGANIZED HEALTH CARE EDUCATION/TRAINING PROGRAM

## 2025-07-07 RX ORDER — HEPARIN SODIUM 1000 [USP'U]/ML
1000 INJECTION, SOLUTION INTRAVENOUS; SUBCUTANEOUS
Status: CANCELLED | OUTPATIENT
Start: 2025-07-07 | End: 2025-07-08

## 2025-07-07 RX ORDER — HEPARIN SODIUM 1000 [USP'U]/ML
1000 INJECTION, SOLUTION INTRAVENOUS; SUBCUTANEOUS
Status: DISPENSED | OUTPATIENT
Start: 2025-07-07 | End: 2025-07-08

## 2025-07-07 RX ORDER — SODIUM CHLORIDE 9 MG/ML
INJECTION, SOLUTION INTRAVENOUS ONCE
Status: CANCELLED | OUTPATIENT
Start: 2025-07-07 | End: 2025-07-07

## 2025-07-07 RX ORDER — FLUCONAZOLE 100 MG/1
100 TABLET ORAL DAILY
Status: DISCONTINUED | OUTPATIENT
Start: 2025-07-07 | End: 2025-07-12 | Stop reason: HOSPADM

## 2025-07-07 RX ORDER — SODIUM CHLORIDE 9 MG/ML
INJECTION, SOLUTION INTRAVENOUS
Status: CANCELLED | OUTPATIENT
Start: 2025-07-07

## 2025-07-07 RX ADMIN — ERYTHROMYCIN: 5 OINTMENT OPHTHALMIC at 12:07

## 2025-07-07 RX ADMIN — HYDRALAZINE HYDROCHLORIDE 10 MG: 10 TABLET, FILM COATED ORAL at 02:07

## 2025-07-07 RX ADMIN — ERYTHROMYCIN: 5 OINTMENT OPHTHALMIC at 05:07

## 2025-07-07 RX ADMIN — APIXABAN 2.5 MG: 2.5 TABLET, FILM COATED ORAL at 08:07

## 2025-07-07 RX ADMIN — SODIUM BICARBONATE 650 MG TABLET 650 MG: at 08:07

## 2025-07-07 RX ADMIN — ISOSORBIDE DINITRATE 5 MG: 5 TABLET ORAL at 02:07

## 2025-07-07 RX ADMIN — ATORVASTATIN CALCIUM 80 MG: 40 TABLET, FILM COATED ORAL at 08:07

## 2025-07-07 RX ADMIN — CEFTRIAXONE 1 G: 1 INJECTION, POWDER, FOR SOLUTION INTRAMUSCULAR; INTRAVENOUS at 08:07

## 2025-07-07 RX ADMIN — HEPARIN SODIUM 1000 UNITS: 1000 INJECTION INTRAVENOUS; SUBCUTANEOUS at 01:07

## 2025-07-07 RX ADMIN — CARVEDILOL 6.25 MG: 6.25 TABLET, FILM COATED ORAL at 08:07

## 2025-07-07 RX ADMIN — CEFTAZIDIME: 1 INJECTION, POWDER, FOR SOLUTION INTRAMUSCULAR; INTRAVENOUS at 06:07

## 2025-07-07 RX ADMIN — SEVELAMER CARBONATE 2.4 G: 2400 POWDER, FOR SUSPENSION ORAL at 02:07

## 2025-07-07 RX ADMIN — SEVELAMER CARBONATE 2.4 G: 2400 POWDER, FOR SUSPENSION ORAL at 05:07

## 2025-07-07 RX ADMIN — FUROSEMIDE 40 MG: 40 TABLET ORAL at 08:07

## 2025-07-07 RX ADMIN — PROMETHAZINE HYDROCHLORIDE 12.5 MG: 25 INJECTION INTRAMUSCULAR; INTRAVENOUS at 05:07

## 2025-07-07 RX ADMIN — ALLOPURINOL 100 MG: 100 TABLET ORAL at 08:07

## 2025-07-07 RX ADMIN — SERTRALINE 100 MG: 100 TABLET, FILM COATED ORAL at 08:07

## 2025-07-07 RX ADMIN — ERYTHROMYCIN: 5 OINTMENT OPHTHALMIC at 11:07

## 2025-07-07 RX ADMIN — FLUCONAZOLE 100 MG: 100 TABLET ORAL at 02:07

## 2025-07-07 RX ADMIN — SACUBITRIL AND VALSARTAN 1 TABLET: 24; 26 TABLET, FILM COATED ORAL at 02:07

## 2025-07-07 RX ADMIN — CINACALCET HYDROCHLORIDE 30 MG: 30 TABLET, FILM COATED ORAL at 05:07

## 2025-07-07 RX ADMIN — FUROSEMIDE 40 MG: 40 TABLET ORAL at 02:07

## 2025-07-07 RX ADMIN — HYDROMORPHONE HYDROCHLORIDE 0.5 MG: 1 INJECTION, SOLUTION INTRAMUSCULAR; INTRAVENOUS; SUBCUTANEOUS at 03:07

## 2025-07-07 RX ADMIN — SODIUM CHLORIDE: 9 INJECTION, SOLUTION INTRAVENOUS at 09:07

## 2025-07-07 RX ADMIN — OXYCODONE 5 MG: 5 TABLET ORAL at 02:07

## 2025-07-07 RX ADMIN — CLOPIDOGREL BISULFATE 75 MG: 75 TABLET, FILM COATED ORAL at 08:07

## 2025-07-07 RX ADMIN — OXYCODONE 5 MG: 5 TABLET ORAL at 08:07

## 2025-07-07 RX ADMIN — ERYTHROMYCIN: 5 OINTMENT OPHTHALMIC at 02:07

## 2025-07-07 RX ADMIN — CARVEDILOL 6.25 MG: 6.25 TABLET, FILM COATED ORAL at 02:07

## 2025-07-07 NOTE — PROGRESS NOTES
Peritoneal Dialysis: Patient connected to drain bag using aseptic technique.  Patient drained 1000 ml of clear, yellow effluent. PD catheter capped with sterile betadine cap and secured to abdomen with paper tape. Effluent samples sent to lab for cell count and cultures per orders.

## 2025-07-07 NOTE — PROGRESS NOTES
Andrew Andrade - [facility] Medicine  Progress Note    Patient Name: Suyapa Connelly  MRN: [id number]  Patient Class: IP- Inpatient   Admission Date: 7/3/2025  Length of Stay: 2 days  Attending Physician: Randell Garcia MD  Primary Care Provider: Vanessa Noel MD        Subjective     Principal Problem:Hyperkalemia        HPI:  60 yo F with ESRD, previously on thrice-weekly HD, now transitioning to home PD and currently receiving once weekly HD, last session 1 week ago, presenting to ED with dysuria, weakness, HA, N/V. K 7.0. EKG ordered, pending. Calcium, insulin, D50, albuterol, Lokelma ordered for medical management.      reports she is more somnolent, CT of head is pending.    Overview/Hospital Course:  No notes on file    Interval History:   UC is pending, kristian nesbitt hoemme today if taht is resulted.      Review of Systems  Objective:     Vital Signs (Most Recent):  Temp: 98.2 °F (36.8 °C) (07/07/25 0747)  Pulse: 80 (07/07/25 0533)  Resp: 18 (07/07/25 0830)  BP: 110/62 (07/07/25 0747)  SpO2: 100 % (07/07/25 0415) Vital Signs (24h Range):  Temp:  [97.7 °F (36.5 °C)-98.5 °F (36.9 °C)] 98.2 °F (36.8 °C)  Pulse:  [79-91] 80  Resp:  [8-24] 18  SpO2:  [89 %-100 %] 100 %  BP: (107-119)/(56-62) 110/62     Weight: 64 kg (141 lb 1.5 oz)  Body mass index is 23.48 kg/m².    Intake/Output Summary (Last 24 hours) at 7/7/2025 1000  Last data filed at 7/7/2025 0415  Gross per 24 hour   Intake --   Output 200 ml   Net -200 ml         Physical Exam  Constitutional:       General: She is in acute distress.      Appearance: She is ill-appearing.   HENT:      Head: Normocephalic.      Nose: Nose normal.      Mouth/Throat:      Mouth: Mucous membranes are moist.   Cardiovascular:      Rate and Rhythm: Normal rate.   Pulmonary:      Effort: Pulmonary effort is normal.   Abdominal:      General: Abdomen is flat.   Musculoskeletal:         General: Normal range of motion.      Cervical back: Normal  "range of motion.   Skin:     General: Skin is warm.   Neurological:      General: No focal deficit present.               Significant Labs: All pertinent labs within the past 24 hours have been reviewed.  BMP: No results for input(s): "GLU", "NA", "K", "CL", "CO2", "BUN", "CREATININE", "CALCIUM", "MG" in the last 48 hours.    Significant Imaging: I have reviewed all pertinent imaging results/findings within the past 24 hours.      Assessment & Plan  Hyperkalemia  Hyperkalemia is likely due to ESRD.The patients most recent potassium results are listed below.  Recent Labs     07/05/25  0814   K 4.3     Plan  - Monitor for arrhythmias with EKG and/or continuous telemetry.   - Treat the hyperkalemia with Potassium Binders.   - Monitor potassium: Every 12 hours  - The patient's hyperkalemia is stable  Will get                             urgent HD in ER  Nephrology consulted          S/P CABG x 1  stable    Anemia due to chronic kidney disease, on chronic dialysis  Anemia is likely due to chronic disease due to ESRD. Most recent hemoglobin and hematocrit are listed below.  Recent Labs     07/05/25  0814   HGB 10.2*   HCT 31.8*     Plan  - Monitor serial CBC: Daily  - Transfuse PRBC if patient becomes hemodynamically unstable, symptomatic or H/H drops below 7/21.  - Patient has not received any PRBC transfusions to date  - Patient's anemia is currently stable    Paroxysmal atrial fibrillation  Patient has persistent (7 days or more) atrial fibrillation. Patient is currently in sinus rhythm. [id number] Score: 3. The patients heart rate in the last 24 hours is as follows:  Pulse  Min: 79  Max: 91     Antiarrhythmics       Anticoagulants  apixaban tablet 2.5 mg, 2 times daily, Oral    Plan  - Replete lytes with a goal of K>4, Mg >2  - Patient is anticoagulated, see medications listed above.  - Patient's afib is currently controlled      Encephalopathy  Uncertain etiology  Will check UA, PD fluid  CT of head pending    ESRD " (end stage renal disease)  Creatine stable for now. BMP reviewed- noted Estimated Creatinine Clearance: 8.9 mL/min (A) (based on SCr of 6.2 mg/dL (H)). according to latest data. Based on current GFR, CKD stage is end stage.  Monitor UOP and serial BMP and adjust therapy as needed. Renally dose meds. Avoid nephrotoxic medications and procedures.  VTE Risk Mitigation (From admission, onward)           Ordered     apixaban tablet 2.5 mg  2 times daily         07/03/25 1625     IP VTE HIGH RISK PATIENT  Once         07/03/25 1620     Place sequential compression device  Until discontinued         07/03/25 1620     heparin (porcine) injection 1,000 Units  As needed (PRN)         07/03/25 1500                    Discharge Planning   DEVAN: 7/7/2025     Code Status: Full Code   Medical Readiness for Discharge Date:   Discharge Plan A: Home with family                        Randell Garcia MD  Department of Hospital Medicine   [facility] - Acute Medical Stepdown

## 2025-07-07 NOTE — PHARMACY MED REC
"Pharmacokinetic Initial Assessment: IV Vancomycin    Assessment/Plan:    Initiate intravenous vancomycin with loading dose of 1000 mg once in the PD fluid, with subsequent doses when random concentrations are less than 20 mcg/mL  Desired empiric serum trough concentration is 10 to 20 mcg/mL  Draw vancomycin random level no earlier than day 3 after the loading dose  Pharmacy will continue to follow and monitor vancomycin.      Please contact pharmacy at extension [phone] with any questions regarding this assessment.     Thank you for the consult,   Marques Ellington       Patient brief summary:  Suyapa Connelly is a 59 y.o. female initiated on antimicrobial therapy with IV Vancomycin for treatment of suspected Peritonitis    --Peritoneal fluid culture and gram stain pending to evaluate for peritonitis, given WBC > 100 on fluid studies, concern for possible peritonitis; pt received vanco/ceftazidine with PD     Drug Allergies:   Review of patient's allergies indicates:   Allergen Reactions    Ciprofloxacin Itching    Pcn [penicillins]      Rash; tolerated ceftriaxone on 1/13/20  Tolerating Augmentin November 2024       Actual Body Weight:   64 kg    Renal Function:   Estimated Creatinine Clearance: 8 mL/min (A) (based on SCr of 6.9 mg/dL (H)).,     Dialysis Method (if applicable):  peritoneal dialysis    CBC (last 72 hours):  Recent Labs   Lab Result Units 07/05/25  0814   WBC K/uL 6.24   HGB gm/dL 10.2*   HCT % 31.8*   Platelet Count K/uL 247   Lymph % % 9.5*   Mono % % 20.5*   Eos % % 2.6   Basophil % % 1.0       Metabolic Panel (last 72 hours):  Recent Labs   Lab Result Units 07/05/25  0814 07/07/25  0952   Sodium mmol/L 132* 133*   Potassium mmol/L 4.3 3.8   Chloride mmol/L 96 95   CO2 mmol/L 23 25   Glucose mg/dL 148* 122*   BUN mg/dL 48* 46*   Creatinine mg/dL 6.2* 6.9*   Albumin g/dL 2.5* 2.2*   Phosphorus Level mg/dL 5.1* 4.8*       Drug levels (last 3 results):  No results for input(s): "VANCOMYCINRA", " ""VANCORANDOM", "VANCOMYCINPE", "VANCOPEAK", "VANCOMYCINTR", "VANCOTROUGH" in the last 72 hours.    Microbiologic Results:  Microbiology Results (last 7 days)       Procedure Component Value Units Date/Time    Gram stain [[id number]] Collected: 07/07/25 1332    Order Status: Sent Specimen: Body Fluid from Peritoneal Fluid     Culture, Body Fluid - Bactec [[id number]] Collected: 07/07/25 1332    Order Status: Sent Specimen: Body Fluid from Peritoneal Fluid     Fungus culture [[id number]]  (Normal) Collected: 07/03/25 1958    Order Status: Completed Specimen: Peritoneal Fluid from Peritoneal Dialysate Updated: 07/07/25 1054     Fungal Culture Culture In Progress    Urine culture [[id number]]  (Abnormal)  (Susceptibility) Collected: 07/04/25 0448    Order Status: Completed Specimen: Urine, Void Updated: 07/07/25 1048     Urine Culture >100,000 cfu/ml Klebsiella pneumoniae esbl    Aerobic culture [[id number]] Collected: 07/03/25 1958    Order Status: Completed Specimen: Peritoneal Fluid Updated: 07/07/25 0901     CULTURE, AEROBIC No Growth    Gram stain [[id number]] Collected: 07/03/25 1958    Order Status: Completed Specimen: Peritoneal Fluid Updated: 07/04/25 1429     GRAM STAIN No WBCs      No organisms seen            "

## 2025-07-07 NOTE — PROGRESS NOTES
Arrived to the [facility] by bed. Maintenance dialysis started via Left IJ tunneled catheter. Vitals stable.

## 2025-07-07 NOTE — PLAN OF CARE
Problem: Skin Injury Risk Increased  Goal: Skin Health and Integrity  Outcome: Progressing     Problem: Hemodialysis  Goal: Safe, Effective Therapy Delivery  Outcome: Progressing  Goal: Effective Tissue Perfusion  Outcome: Progressing  Goal: Absence of Infection Signs and Symptoms  Outcome: Progressing     Problem: Adult Inpatient Plan of Care  Goal: Plan of Care Review  Outcome: Progressing  Goal: Patient-Specific Goal (Individualized)  Outcome: Progressing  Goal: Absence of Hospital-Acquired Illness or Injury  Outcome: Progressing  Goal: Optimal Comfort and Wellbeing  Outcome: Progressing  Goal: Readiness for Transition of Care  Outcome: Progressing     Problem: Diabetes Comorbidity  Goal: Blood Glucose Level Within Targeted Range  Outcome: Progressing     Problem: Sepsis/Septic Shock  Goal: Optimal Coping  Outcome: Progressing  Goal: Absence of Bleeding  Outcome: Progressing  Goal: Blood Glucose Level Within Targeted Range  Outcome: Progressing  Goal: Absence of Infection Signs and Symptoms  Outcome: Progressing  Goal: Optimal Nutrition Intake  Outcome: Progressing     Problem: Infection  Goal: Absence of Infection Signs and Symptoms  Outcome: Progressing     Problem: Fall Injury Risk  Goal: Absence of Fall and Fall-Related Injury  Outcome: Progressing     Problem: Wound  Goal: Optimal Coping  Outcome: Progressing  Goal: Optimal Functional Ability  Outcome: Progressing  Goal: Absence of Infection Signs and Symptoms  Outcome: Progressing  Goal: Improved Oral Intake  Outcome: Progressing  Goal: Optimal Pain Control and Function  Outcome: Progressing  Goal: Skin Health and Integrity  Outcome: Progressing  Goal: Optimal Wound Healing  Outcome: Progressing

## 2025-07-07 NOTE — PROGRESS NOTES
Dialysis completed. Left IJ tunneled catheter flushed with normal saline, heparinized, capped and ends wrapped with sterile gauze. Dialyzed for 3.5 hours with fluid removal of 1 liter. Tolerated well with stable vital signs. Returned to room by baldomero.

## 2025-07-07 NOTE — PROGRESS NOTES
07/06/25 2055 07/06/25 2105        Peritoneal Dialysis Catheter Mid lower abdomen   No placement date or time found.   Present Prior to Hospital Arrival?: Yes  Catheter Location: Mid lower abdomen   Site Assessment Clean;Dry;Intact;No redness;No swelling  --    Dressing Intervention Sterile dressing change  --    Status Accessed  --    Dressing Status Clean;Dry;Intact  --    Dressing Gauze  --    Securement secured to abdomen with tape  --    Clamp Status unclamped  --    Peritoneal Dialysis   Exchange Type Manual  --    Peritoneal Treatment Status Started Completed   Cycler Peritoneal Dialysis   Fill Volume (mL)  --  2000 mL   Cycler Treatment Comments CAPD fill start Fill ended

## 2025-07-07 NOTE — ASSESSMENT & PLAN NOTE
Infectious vs uremic etiology vs intracranial etiology    -Peritoneal fluid culture and gram stain pending to evaluate for peritonitis, given WBC > 100 on fluid studies, concern for possible peritonitis; pt received vanco/ceftazidine with PD  - Repeat peritoneal fluid analysis   - On IV Rocephin for UTI per primary team  -Continue to monitor symptoms

## 2025-07-07 NOTE — ASSESSMENT & PLAN NOTE
Patient has persistent (7 days or more) atrial fibrillation. Patient is currently in sinus rhythm. [id number] Score: 3. The patients heart rate in the last 24 hours is as follows:  Pulse  Min: 79  Max: 91     Antiarrhythmics       Anticoagulants  apixaban tablet 2.5 mg, 2 times daily, Oral    Plan  - Replete lytes with a goal of K>4, Mg >2  - Patient is anticoagulated, see medications listed above.  - Patient's afib is currently controlled

## 2025-07-07 NOTE — ASSESSMENT & PLAN NOTE
Nephrology History  -Outpatient HD unit: [facility]  -Nephrologist:   -HD tx days: Previously MWF; now once weekly only as transitioning to PD  -Last HD tx: 6/27 prior to hospitalization  -HD access: tunneled IJ cath  -HD modality: iHD/PD  -Residual urine: +  -EDW: 67kg?    - HD today per MWF schedule  - Reassess need for continued iHD   - Strict I&O, pre and post dialysis weights  - Renal diet if able to take po

## 2025-07-07 NOTE — CONSULTS
[facility]  Adult Nutrition  Consult Note    SUMMARY     Recommendations    1.) Continue with Renal w/ HD/Diabetic diet as tolerated, gently encouraging PO intake.      - RN staff: please continue to document PO intake in the flowsheets     2.) Recommend Novasource Renal, daily for an additional 475 kcal and 22g PRO.     3.) RD to monitor wt, PO intake, skin, labs.      Goals: 1. Meet 75% EN through admission. 2. Maintain dry weight.  Nutrition Goal Status: progressing towards goal  Communication of RD Recs:  (POC)    Nutrition Discharge Planning     Nutrition Discharge Planning: Therapeutic diet (comments)  Therapeutic diet (comments): Renal, Diabetic    Assessment and Plan    Endocrine  Moderate malnutrition  Malnutrition Type:  Context: chronic illness  Level: moderate    Related to (etiology):   Inadequate protein- calorie intake     Signs and Symptoms (as evidenced by):   -20% x 12 month wt loss, mild muscle/fat wasting, and pt endorses low intake for 1 week    Malnutrition Characteristic Summary:  Weight Loss (Malnutrition): 20% in 1 year  Energy Intake (Malnutrition): less than 75% for greater than 7 days  Subcutaneous Fat (Malnutrition): mild depletion  Muscle Mass (Malnutrition): mild depletion  Fluid Accumulation (Malnutrition): moderate    Interventions/Recommendations (treatment strategy):  Collaboration of nutritional care with other providers.      Nutrition Diagnosis Status:   New       Malnutrition Assessment  Malnutrition Context: chronic illness  Malnutrition Level: moderate  Skin (Micronutrient): none  Nails (Micronutrient): none  Hair/Scalp (Micronutrient): none  Eyes (Micronutrient): none  Extraoral (Micronutrient): none  Gums (Micronutrient): none  Lips/Mucous Membranes (Micronutrient): none  Teeth (Micronutrient): none  Tongue (Micronutrient): none  Neck/Chest (Micronutrient): none  Musculoskeletal/Lower Extremities: none   Micronutrient Evaluation Summary: no  "deficiencies   Weight Loss (Malnutrition): 20% in 1 year  Energy Intake (Malnutrition): less than 75% for greater than 7 days  Subcutaneous Fat (Malnutrition): mild depletion  Muscle Mass (Malnutrition): mild depletion  Fluid Accumulation (Malnutrition): moderate   Orbital Region (Subcutaneous Fat Loss): mild depletion  Upper Arm Region (Subcutaneous Fat Loss): moderate depletion  Thoracic and Lumbar Region: mild depletion   [place] Region (Muscle Loss): mild depletion  Clavicle Bone Region (Muscle Loss): mild depletion  Clavicle and Acromion Bone Region (Muscle Loss): well nourished  Scapular Bone Region (Muscle Loss): well nourished  Dorsal Hand (Muscle Loss): mild depletion     Reason for Assessment    Reason For Assessment: consult  Diagnosis: other (see comments) (Hyperkalemia)    General Information Comments: RD consulted for "at risk for PI". Pt is already followed by RD team. Pt denies n/v/d/c. Pt endorses headache, and low appetite, consuming < 50% of their meals. Pt stated that PO intake has been low for about a week. RD noted significant wt loss at the 12 month marker: - 20%. RD was able to perform NFPE: RD feels pt meets the criteria for moderate malnutrition in the context of chronic illness. Please review PES and malnutrition assessment. No assessment from wound care at this time. Last HD session was earlier today. RD team to continue to monitor and f/u.     Nutrition/Diet History    Spiritual, Cultural Beliefs, Cheondoism Practices, Values that Affect Care: other (see comments) (none identified)  Food Allergies: NKFA  Factors Affecting Nutritional Intake: None identified at this time  Nutrition-related SDOH: None Identified    Anthropometrics    Height: 5' 5" (165.1 cm)  Height (inches): 65 in  Height Method: Estimated  Weight: 64 kg (141 lb 1.5 oz)  Weight (lb): 141.1 lb  Weight Method: Bed Scale  Ideal Body Weight (IBW), Female: 125 lb  % Ideal Body Weight, Female (lb): 112.88 %  BMI (Calculated): " 23.5  BMI Grade:  (cannot use bilateral AKA)  Amputation %: 16, 16  Total Amputation %: 32    Lab/Procedures/Meds    Pertinent Labs Reviewed: reviewed  Pertinent Labs Comments: Na: 133, BUN: 46, CR: 6.9, GFR: 6, gluc: 122, phos: 4.8  Pertinent Medications Reviewed: reviewed  Pertinent Medications Comments: NaCl, allopurinol, statin, famotidine, lasix, sevelamer, Na Bicarb    Estimated/Assessed Needs    Weight Used For Calorie Calculations: 64 kg (141 lb 1.5 oz)  Energy Calorie Requirements (kcal): 1920 kcal  Energy Need Method: Kcal/kg (30 kcal/kg)  Protein Requirements: 77g (1.2g/kg)  Weight Used For Protein Calculations: 64 kg (141 lb 1.5 oz)  Fluid Requirements (mL): as per MD or RDA  Estimated Fluid Requirement Method: RDA Method  RDA Method (mL): 1920  CHO Requirement: 240g    Nutrition Prescription Ordered    Current Diet Order: DM/Renal on HD diet    Evaluation of Received Nutrient/Fluid Intake    I/O: +190ml since admit  Energy Calories Required: not meeting needs  Protein Required: not meeting needs  Fluid Required:  (as per MD)  Comments: LBM 7/2  Tolerance: tolerating  % Intake of Estimated Energy Needs: 0 - 25 %  % Meal Intake: 0 - 25 %    PES Statement   (Need for therapeutic diet (Renal-On Dialysis, CCHO)) related to  (ESRD and DM) as evidenced by  (Medical hx, on dialysis.)  Status: Continues    Nutrition Risk    Level of Risk/Frequency of Follow-up: low - moderate     Monitor and Evaluation    Monitor and Evaluation: Food and beverage intake, Diet order, Weight, Electrolyte and renal panel, Gastrointestinal profile, Glucose/endocrine profile     Nutrition Follow-Up    RD Follow-up?: Yes

## 2025-07-07 NOTE — PLAN OF CARE
07/07/25 1443   Rounds   Attendance Nurse ;Assigned nurse;Charge nurse  (AVA)   Discharge Plan A Home with family   Why the patient remains in the hospital Requires continued medical care   Transition of Care Barriers None     Emmett Pedro, RN, BSN

## 2025-07-07 NOTE — ASSESSMENT & PLAN NOTE
Malnutrition Type:  Context: chronic illness  Level: moderate    Related to (etiology):   Inadequate protein- calorie intake     Signs and Symptoms (as evidenced by):   -20% x 12 month wt loss, mild muscle/fat wasting, and pt endorses low intake for 1 week    Malnutrition Characteristic Summary:  Weight Loss (Malnutrition): 20% in 1 year  Energy Intake (Malnutrition): less than 75% for greater than 7 days  Subcutaneous Fat (Malnutrition): mild depletion  Muscle Mass (Malnutrition): mild depletion  Fluid Accumulation (Malnutrition): moderate    Interventions/Recommendations (treatment strategy):  Collaboration of nutritional care with other providers.      Nutrition Diagnosis Status:   New

## 2025-07-07 NOTE — PROGRESS NOTES
[facility]  Nephrology  Progress Note    Patient Name: Suyapa Connelly  MRN: [id number]  Admission Date: 7/3/2025  Hospital Length of Stay: 2 days  Attending Provider: Ranedll Garcia MD   Primary Care Physician: Vanessa Noel MD  Principal Problem:Hyperkalemia    Subjective:     HPI: Patient is a 59-year-old female with past medical history as stated below significant for ESRD secondary to hypertensive disease previously on HD MWF schedule via tunneled IJ cath currently transitioning to home PD, type II diabetes mellitus, hypertension, hyperlipidemia, severe PAD s/p bilateral AKA c/b poor healing, L common iliac stent, CAD s/p CABG, HFrEF, obesity, pAF, depression, CAROLIN, mod-sev TR who presented to ED for lethargy, nausea/vomiting, dysuria and abnormal urine appearance. She is accompanied by her  who provides the majority of the history as patient appears to be somnolent during exam.   endorses that patient has been having nausea/vomiting and lethargy for the past few days but the dysuria started today. They are currently training for peritoneal dialysis at home. She had the peritoneal dialysis catheter placed 2-3 weeks ago and has been training over the past week. Her last full hemodialysis session was approximately one week ago. She went to her ophthalmologist today with eye pain and blurry vision; she was diagnosed with blepheritis. However, was recommended to present to ED for further evaluation of her lethargy and urinary symptoms.  ER Course: Afebrile and otherwise hemodynamically stable on room air. CBC with normal wbc, stable anemia with hemoglobin 11, normal platelets. CMP notable for hyponatremia 129, hyperkalemia 7.0, metabolic acidosis with bicarb 11, BUN/sCr 113/9.8.  EKG personally reviewed with T wave changes most significant in leads V2 and V3. Nephrology consulted for hyperkalemia and ESRD management.     Interval History: Patient evaluated at bedside  this morning in room and again during dialysis. She is resting comfortably in bed. She endorses headache but otherwise denies any acute concerns.    Review of patient's allergies indicates:   Allergen Reactions    Ciprofloxacin Itching    Pcn [penicillins]      Rash; tolerated ceftriaxone on 1/13/20  Tolerating Augmentin November 2024     Current Facility-Administered Medications   Medication Frequency    0.9% NaCl infusion PRN    0.9% NaCl infusion Once    acetaminophen tablet 650 mg Q8H PRN    allopurinoL tablet 100 mg Daily    apixaban tablet 2.5 mg BID    atorvastatin tablet 80 mg Daily    carvediloL tablet 6.25 mg BID    cefTRIAXone injection 1 g Q24H    cinacalcet tablet 30 mg Every Mon, Wed, Fri    clopidogreL tablet 75 mg Daily    dextrose 50% injection 12.5 g PRN    dextrose 50% injection 25 g PRN    erythromycin 5 mg/gram (0.5 %) ophthalmic ointment Q6H    famotidine tablet 20 mg Q48H    fluconazole tablet 100 mg Daily    furosemide tablet 40 mg BID    glucagon (human recombinant) injection 1 mg PRN    glucose chewable tablet 16 g PRN    glucose chewable tablet 24 g PRN    heparin (porcine) injection 1,000 Units PRN    hydrALAZINE tablet 10 mg Q12H    HYDROmorphone injection 0.5 mg Q6H PRN    insulin aspart U-100 pen 0-5 Units QID (AC + HS) PRN    isosorbide dinitrate tablet 5 mg BID    oxyCODONE immediate release tablet 5 mg Q6H PRN    Peritoneal Dialysis Solution with Additives Once    promethazine 12.5 mg in 0.9% NaCl 50 mL IVPB Q6H PRN    sacubitriL-valsartan 24-26 mg per tablet 1 tablet BID    sertraline tablet 100 mg Daily    sevelamer carbonate 2.4 gram powder 2.4 g TID WM    sodium bicarbonate tablet 650 mg BID    sodium chloride 0.9% bolus 250 mL 250 mL PRN    sodium chloride 0.9% flush 10 mL PRN    trazodone split tablet 25 mg Nightly PRN       Objective:     Vital Signs (Most Recent):  Temp: 98.2 °F (36.8 °C) (07/07/25 0940)  Pulse: 79 (07/07/25 1245)  Resp: 18 (07/07/25 0940)  BP: (!) 148/60  (07/07/25 1245)  SpO2: (!) 94 % (07/07/25 0940) Vital Signs (24h Range):  Temp:  [97.7 °F (36.5 °C)-98.5 °F (36.9 °C)] 98.2 °F (36.8 °C)  Pulse:  [78-91] 79  Resp:  [8-24] 18  SpO2:  [89 %-100 %] 94 %  BP: (107-148)/(56-66) 148/60     Weight: 64 kg (141 lb 1.5 oz) (07/04/25 1204)  Body mass index is 23.48 kg/m².  Body surface area is 1.71 meters squared.    I/O last 3 completed shifts:  In: -   Out: 200 [Urine:200]     Physical Exam  Vitals and nursing note reviewed.   Constitutional:       Appearance: Normal appearance. She is ill-appearing (chronically).   Eyes:      General: No scleral icterus.     Extraocular Movements: Extraocular movements intact.   Cardiovascular:      Rate and Rhythm: Normal rate and regular rhythm.      Pulses: Normal pulses.   Pulmonary:      Effort: Pulmonary effort is normal. No respiratory distress.   Musculoskeletal:         General: Deformity (bilateral AKA) present. Normal range of motion.   Skin:     General: Skin is warm and dry.   Neurological:      General: No focal deficit present.      Mental Status: She is alert. Mental status is at baseline.   Psychiatric:         Mood and Affect: Mood normal.          Significant Labs:  All labs within the past 24 hours have been reviewed.     Significant Imaging:  Labs: Reviewed  ECG: Reviewed  CT: Reviewed  MRI: Reviewed    Assessment/Plan:     Neuro  Encephalopathy  Infectious vs uremic etiology vs intracranial etiology    -Peritoneal fluid culture and gram stain pending to evaluate for peritonitis, given WBC > 100 on fluid studies, concern for possible peritonitis; pt received vanco/ceftazidine with PD  - Repeat peritoneal fluid analysis   - On IV Rocephin for UTI per primary team  -Continue to monitor symptoms    Renal/  ESRD (end stage renal disease)  Nephrology History  -[facility] HD [facility]  -Nephrologist:   -HD tx days: Previously MWF; now once weekly only as transitioning to PD  -Last HD tx: 6/27 prior to  hospitalization  -HD access: tunneled IJ cath  -HD modality: iHD/PD  -Residual urine: +  -EDW: 67kg?    - HD today per [facility] schedule  - Reassess need for continued iHD   - Strict I&O, pre and post dialysis weights  - Renal diet if able to take po        Oncology  Anemia due to chronic kidney disease, on chronic dialysis  Current hemoglobin >10. Continue to trend    -Defer EPO administration at this time but will continue to assess need with HD sessions      Patient is at high risk for mortality given encephalopathy in setting of UTI with possible peritonitis, ESRD on HD, anemia, bilateral AKA, and other underlying comorbidities.    Thank you for your consult. I will follow-up with patient. Please contact us if you have any additional questions.    Hayley Paniagua MD  Nephrology PGY-4  [facility]

## 2025-07-07 NOTE — ASSESSMENT & PLAN NOTE
Hyperkalemia is likely due to ESRD.The patients most recent potassium results are listed below.  Recent Labs     07/05/25  0814   K 4.3     Plan  - Monitor for arrhythmias with EKG and/or continuous telemetry.   - Treat the hyperkalemia with Potassium Binders.   - Monitor potassium: Every 12 hours  - The patient's hyperkalemia is stable  Will get                             urgent HD in ER  Nephrology consulted

## 2025-07-07 NOTE — ASSESSMENT & PLAN NOTE
Anemia is likely due to chronic disease due to ESRD. Most recent hemoglobin and hematocrit are listed below.  Recent Labs     07/05/25  0814   HGB 10.2*   HCT 31.8*     Plan  - Monitor serial CBC: Daily  - Transfuse PRBC if patient becomes hemodynamically unstable, symptomatic or H/H drops below 7/21.  - Patient has not received any PRBC transfusions to date  - Patient's anemia is currently stable

## 2025-07-07 NOTE — SUBJECTIVE & OBJECTIVE
"Interval History:   UC is pending, kristian cochran today if taht is resulted.      Review of Systems  Objective:     Vital Signs (Most Recent):  Temp: 98.2 °F (36.8 °C) (07/07/25 0747)  Pulse: 80 (07/07/25 0533)  Resp: 18 (07/07/25 0830)  BP: 110/62 (07/07/25 0747)  SpO2: 100 % (07/07/25 0415) Vital Signs (24h Range):  Temp:  [97.7 °F (36.5 °C)-98.5 °F (36.9 °C)] 98.2 °F (36.8 °C)  Pulse:  [79-91] 80  Resp:  [8-24] 18  SpO2:  [89 %-100 %] 100 %  BP: (107-119)/(56-62) 110/62     Weight: 64 kg (141 lb 1.5 oz)  Body mass index is 23.48 kg/m².    Intake/Output Summary (Last 24 hours) at 7/7/2025 1000  Last data filed at 7/7/2025 0415  Gross per 24 hour   Intake --   Output 200 ml   Net -200 ml         Physical Exam  Constitutional:       General: She is in acute distress.      Appearance: She is ill-appearing.   HENT:      Head: Normocephalic.      Nose: Nose normal.      Mouth/Throat:      Mouth: Mucous membranes are moist.   Cardiovascular:      Rate and Rhythm: Normal rate.   Pulmonary:      Effort: Pulmonary effort is normal.   Abdominal:      General: Abdomen is flat.   Musculoskeletal:         General: Normal range of motion.      Cervical back: Normal range of motion.   Skin:     General: Skin is warm.   Neurological:      General: No focal deficit present.               Significant Labs: All pertinent labs within the past 24 hours have been reviewed.  BMP: No results for input(s): "GLU", "NA", "K", "CL", "CO2", "BUN", "CREATININE", "CALCIUM", "MG" in the last 48 hours.    Significant Imaging: I have reviewed all pertinent imaging results/findings within the past 24 hours.  "

## 2025-07-07 NOTE — PLAN OF CARE
Recommendations     1.) Continue with Renal w/ HD/Diabetic diet as tolerated, gently encouraging PO intake.                  - RN staff: please continue to document PO intake in the flowsheets      2.) Recommend Novasource Renal, daily for an additional 475 kcal and 22g PRO.      3.) RD to monitor wt, PO intake, skin, labs.       Goals: 1. Meet 75% EN through admission. 2. Maintain dry weight.  Nutrition Goal Status: progressing towards goal  Communication of RD Recs:  (POC)     Nutrition Discharge Planning      Nutrition Discharge Planning: Therapeutic diet (comments)  Therapeutic diet (comments): Renal, Diabetic

## 2025-07-07 NOTE — ASSESSMENT & PLAN NOTE
>>ASSESSMENT AND PLAN FOR S/P CABG X 1 WRITTEN ON 7/7/2025 10:01 AM BY SUHAS ALVAREZ MD    stable

## 2025-07-07 NOTE — PROGRESS NOTES
Peritoneal Dialysis: CAPD exchange performed using aseptic technique with Dianeal 1.5% instilled to dwell 2 hours for specimen collection. Tolerated well. PD catheter secured to abdomen with paper tape.

## 2025-07-07 NOTE — SUBJECTIVE & OBJECTIVE
Interval History: Patient evaluated at bedside this morning in room and again during dialysis. She is resting comfortably in bed. She endorses headache but otherwise denies any acute concerns.    Review of patient's allergies indicates:   Allergen Reactions    Ciprofloxacin Itching    Pcn [penicillins]      Rash; tolerated ceftriaxone on 1/13/20  Tolerating Augmentin November 2024     Current Facility-Administered Medications   Medication Frequency    0.9% NaCl infusion PRN    0.9% NaCl infusion Once    acetaminophen tablet 650 mg Q8H PRN    allopurinoL tablet 100 mg Daily    apixaban tablet 2.5 mg BID    atorvastatin tablet 80 mg Daily    carvediloL tablet 6.25 mg BID    cefTRIAXone injection 1 g Q24H    cinacalcet tablet 30 mg Every Mon, Wed, Fri    clopidogreL tablet 75 mg Daily    dextrose 50% injection 12.5 g PRN    dextrose 50% injection 25 g PRN    erythromycin 5 mg/gram (0.5 %) ophthalmic ointment Q6H    famotidine tablet 20 mg Q48H    fluconazole tablet 100 mg Daily    furosemide tablet 40 mg BID    glucagon (human recombinant) injection 1 mg PRN    glucose chewable tablet 16 g PRN    glucose chewable tablet 24 g PRN    heparin (porcine) injection 1,000 Units PRN    hydrALAZINE tablet 10 mg Q12H    HYDROmorphone injection 0.5 mg Q6H PRN    insulin aspart U-100 pen 0-5 Units QID (AC + HS) PRN    isosorbide dinitrate tablet 5 mg BID    oxyCODONE immediate release tablet 5 mg Q6H PRN    Peritoneal Dialysis Solution with Additives Once    promethazine 12.5 mg in 0.9% NaCl 50 mL IVPB Q6H PRN    sacubitriL-valsartan 24-26 mg per tablet 1 tablet BID    sertraline tablet 100 mg Daily    sevelamer carbonate 2.4 gram powder 2.4 g TID WM    sodium bicarbonate tablet 650 mg BID    sodium chloride 0.9% bolus 250 mL 250 mL PRN    sodium chloride 0.9% flush 10 mL PRN    trazodone split tablet 25 mg Nightly PRN       Objective:     Vital Signs (Most Recent):  Temp: 98.2 °F (36.8 °C) (07/07/25 0940)  Pulse: 79 (07/07/25  1245)  Resp: 18 (07/07/25 0940)  BP: (!) 148/60 (07/07/25 1245)  SpO2: (!) 94 % (07/07/25 0940) Vital Signs (24h Range):  Temp:  [97.7 °F (36.5 °C)-98.5 °F (36.9 °C)] 98.2 °F (36.8 °C)  Pulse:  [78-91] 79  Resp:  [8-24] 18  SpO2:  [89 %-100 %] 94 %  BP: (107-148)/(56-66) 148/60     Weight: 64 kg (141 lb 1.5 oz) (07/04/25 1204)  Body mass index is 23.48 kg/m².  Body surface area is 1.71 meters squared.    I/O last 3 completed shifts:  In: -   Out: 200 [Urine:200]     Physical Exam  Vitals and nursing note reviewed.   Constitutional:       Appearance: Normal appearance. She is ill-appearing (chronically).   Eyes:      General: No scleral icterus.     Extraocular Movements: Extraocular movements intact.   Cardiovascular:      Rate and Rhythm: Normal rate and regular rhythm.      Pulses: Normal pulses.   Pulmonary:      Effort: Pulmonary effort is normal. No respiratory distress.   Musculoskeletal:         General: Deformity (bilateral AKA) present. Normal range of motion.   Skin:     General: Skin is warm and dry.   Neurological:      General: No focal deficit present.      Mental Status: She is alert. Mental status is at baseline.   Psychiatric:         Mood and Affect: Mood normal.          Significant Labs:  All labs within the past 24 hours have been reviewed.     Significant Imaging:  Labs: Reviewed  ECG: Reviewed  CT: Reviewed  MRI: Reviewed

## 2025-07-08 ENCOUNTER — PATIENT OUTREACH (OUTPATIENT)
Dept: ADMINISTRATIVE | Facility: HOSPITAL | Age: 60
End: 2025-07-08
Payer: MEDICARE

## 2025-07-08 LAB
MRSA PCR SCRN (OHS): NOT DETECTED
POCT GLUCOSE: 117 MG/DL (ref 70–110)
POCT GLUCOSE: 127 MG/DL (ref 70–110)
POCT GLUCOSE: 130 MG/DL (ref 70–110)
POCT GLUCOSE: 143 MG/DL (ref 70–110)

## 2025-07-08 PROCEDURE — 90945 DIALYSIS ONE EVALUATION: CPT | Mod: HCNC

## 2025-07-08 PROCEDURE — 27200950 HC CAPD SUPPORT: Mod: HCNC

## 2025-07-08 PROCEDURE — 20600001 HC STEP DOWN PRIVATE ROOM: Mod: HCNC

## 2025-07-08 PROCEDURE — 63700000 PHARM REV CODE 250 ALT 637 W/O HCPCS: Mod: HCNC | Performed by: INTERNAL MEDICINE

## 2025-07-08 PROCEDURE — 25000003 PHARM REV CODE 250: Mod: HCNC

## 2025-07-08 PROCEDURE — 63600175 PHARM REV CODE 636 W HCPCS: Mod: HCNC

## 2025-07-08 PROCEDURE — 63600175 PHARM REV CODE 636 W HCPCS: Mod: HCNC | Performed by: INTERNAL MEDICINE

## 2025-07-08 PROCEDURE — 27000207 HC ISOLATION: Mod: HCNC

## 2025-07-08 PROCEDURE — G0545 PR VISIT INHERENT TO INPT OR OBS CARE, INFECTIOUS DISEASE: HCPCS | Mod: HCNC,,,

## 2025-07-08 PROCEDURE — 94761 N-INVAS EAR/PLS OXIMETRY MLT: CPT | Mod: HCNC

## 2025-07-08 PROCEDURE — 87641 MR-STAPH DNA AMP PROBE: CPT | Mod: HCNC

## 2025-07-08 PROCEDURE — 99223 1ST HOSP IP/OBS HIGH 75: CPT | Mod: HCNC,GC,,

## 2025-07-08 PROCEDURE — 25000003 PHARM REV CODE 250: Mod: HCNC | Performed by: INTERNAL MEDICINE

## 2025-07-08 RX ORDER — SODIUM CHLORIDE 9 MG/ML
INJECTION, SOLUTION INTRAVENOUS
Status: CANCELLED | OUTPATIENT
Start: 2025-07-09

## 2025-07-08 RX ADMIN — ERYTHROMYCIN: 5 OINTMENT OPHTHALMIC at 11:07

## 2025-07-08 RX ADMIN — PROMETHAZINE HYDROCHLORIDE 12.5 MG: 25 INJECTION INTRAMUSCULAR; INTRAVENOUS at 09:07

## 2025-07-08 RX ADMIN — SEVELAMER CARBONATE 2.4 G: 2400 POWDER, FOR SUSPENSION ORAL at 04:07

## 2025-07-08 RX ADMIN — HYDRALAZINE HYDROCHLORIDE 10 MG: 10 TABLET, FILM COATED ORAL at 09:07

## 2025-07-08 RX ADMIN — ERYTHROMYCIN: 5 OINTMENT OPHTHALMIC at 06:07

## 2025-07-08 RX ADMIN — APIXABAN 2.5 MG: 2.5 TABLET, FILM COATED ORAL at 08:07

## 2025-07-08 RX ADMIN — FUROSEMIDE 40 MG: 40 TABLET ORAL at 09:07

## 2025-07-08 RX ADMIN — ISOSORBIDE DINITRATE 5 MG: 5 TABLET ORAL at 09:07

## 2025-07-08 RX ADMIN — HYDROMORPHONE HYDROCHLORIDE 0.5 MG: 1 INJECTION, SOLUTION INTRAMUSCULAR; INTRAVENOUS; SUBCUTANEOUS at 12:07

## 2025-07-08 RX ADMIN — OXYCODONE 5 MG: 5 TABLET ORAL at 09:07

## 2025-07-08 RX ADMIN — APIXABAN 2.5 MG: 2.5 TABLET, FILM COATED ORAL at 09:07

## 2025-07-08 RX ADMIN — CLOPIDOGREL BISULFATE 75 MG: 75 TABLET, FILM COATED ORAL at 08:07

## 2025-07-08 RX ADMIN — SEVELAMER CARBONATE 2.4 G: 2400 POWDER, FOR SUSPENSION ORAL at 08:07

## 2025-07-08 RX ADMIN — CEFTAZIDIME: 1 INJECTION, POWDER, FOR SOLUTION INTRAMUSCULAR; INTRAVENOUS at 08:07

## 2025-07-08 RX ADMIN — SODIUM BICARBONATE 650 MG TABLET 650 MG: at 09:07

## 2025-07-08 RX ADMIN — ALLOPURINOL 100 MG: 100 TABLET ORAL at 08:07

## 2025-07-08 RX ADMIN — CEFTRIAXONE 1 G: 1 INJECTION, POWDER, FOR SOLUTION INTRAMUSCULAR; INTRAVENOUS at 08:07

## 2025-07-08 RX ADMIN — ERTAPENEM 500 MG: 1 INJECTION INTRAMUSCULAR; INTRAVENOUS at 12:07

## 2025-07-08 RX ADMIN — FAMOTIDINE 20 MG: 20 TABLET, FILM COATED ORAL at 08:07

## 2025-07-08 RX ADMIN — OXYCODONE 5 MG: 5 TABLET ORAL at 08:07

## 2025-07-08 RX ADMIN — SODIUM BICARBONATE 650 MG TABLET 650 MG: at 08:07

## 2025-07-08 RX ADMIN — ATORVASTATIN CALCIUM 80 MG: 40 TABLET, FILM COATED ORAL at 08:07

## 2025-07-08 RX ADMIN — SERTRALINE 100 MG: 100 TABLET, FILM COATED ORAL at 08:07

## 2025-07-08 RX ADMIN — ISOSORBIDE DINITRATE 5 MG: 5 TABLET ORAL at 08:07

## 2025-07-08 RX ADMIN — CARVEDILOL 6.25 MG: 6.25 TABLET, FILM COATED ORAL at 09:07

## 2025-07-08 RX ADMIN — FUROSEMIDE 40 MG: 40 TABLET ORAL at 08:07

## 2025-07-08 RX ADMIN — FLUCONAZOLE 100 MG: 100 TABLET ORAL at 08:07

## 2025-07-08 RX ADMIN — CARVEDILOL 6.25 MG: 6.25 TABLET, FILM COATED ORAL at 08:07

## 2025-07-08 RX ADMIN — SACUBITRIL AND VALSARTAN 1 TABLET: 24; 26 TABLET, FILM COATED ORAL at 09:07

## 2025-07-08 RX ADMIN — ERYTHROMYCIN: 5 OINTMENT OPHTHALMIC at 02:07

## 2025-07-08 RX ADMIN — HYDRALAZINE HYDROCHLORIDE 10 MG: 10 TABLET, FILM COATED ORAL at 08:07

## 2025-07-08 RX ADMIN — SACUBITRIL AND VALSARTAN 1 TABLET: 24; 26 TABLET, FILM COATED ORAL at 08:07

## 2025-07-08 NOTE — PROGRESS NOTES
PD note:    PD catheter accessed using aseptic technique. Very little effluent drained. 2000 ml of 1.5% dianeal with ordered antibiotics instilled. Pd catheter clamped. PD catheter site cleaned with chloraprep; gauze/tape dressing applied. No drainage noted. Patient tolerated well. Fluid to be drained at approximately 0600 am

## 2025-07-08 NOTE — HPI
60 yo F with ESRD, previously on thrice-weekly HD, now transitioning to home PD and currently receiving once weekly HD, last session 1 week ago, presenting to ED with dysuria, weakness, HA, N/V. K 7.0. EKG ordered, pending. Calcium, insulin, D50, albuterol, Lokelma ordered for medical management.

## 2025-07-08 NOTE — PROGRESS NOTES
07/08/25 0745        Peritoneal Dialysis Catheter Mid lower abdomen   No placement date or time found.   Present Prior to Hospital Arrival?: Yes  Catheter Location: Mid lower abdomen   Site Assessment Clean;Dry;Intact   Clamp Status clamped   Manual Peritoneal Dialysis   Manual Drain Volume (mL) 800 mL   Manual Treatment Comments drain post ABX dwell     Ector drain preformed. Around 800cc removed.clear/yellow. nephro notified only 800cc of 2000cc drained, however fluid dwelled over 12hrs.

## 2025-07-08 NOTE — ASSESSMENT & PLAN NOTE
Creatine stable for now. BMP reviewed- noted Estimated Creatinine Clearance: 8 mL/min (A) (based on SCr of 6.9 mg/dL (H)). according to latest data. Based on current GFR, CKD stage is end stage.  Monitor UOP and serial BMP and adjust therapy as needed. Renally dose meds. Avoid nephrotoxic medications and procedures.

## 2025-07-08 NOTE — PROGRESS NOTES
[facility]  Progress Note    Patient Name: Suyapa Connelly  MRN: [id number]  Patient Class: IP- Inpatient   Admission Date: 7/3/2025  Length of Stay: 3 days  Attending Physician: Randell Garcia MD  Primary Care Provider: Vanessa Noel MD        Subjective     Principal Problem:Hyperkalemia        HPI:  58 yo F with ESRD, previously on thrice-weekly HD, now transitioning to home PD and currently receiving once weekly HD, last session 1 week ago, presenting to ED with dysuria, weakness, HA, N/V. K 7.0. EKG ordered, pending. Calcium, insulin, D50, albuterol, Lokelma ordered for medical management.      reports she is more somnolent, CT of head is pending.    Overview/Hospital Course:  No notes on file    Interval History: Nauseated this am, awit ID consultt.    Review of Systems  Objective:     Vital Signs (Most Recent):  Temp: 98.6 °F (37 °C) (07/08/25 0755)  Pulse: 81 (07/08/25 0506)  Resp: 10 (07/08/25 0829)  BP: (!) 145/62 (07/08/25 0830)  SpO2: 100 % (07/08/25 0506) Vital Signs (24h Range):  Temp:  [97.3 °F (36.3 °C)-98.6 °F (37 °C)] 98.6 °F (37 °C)  Pulse:  [78-84] 81  Resp:  [10-18] 10  SpO2:  [95 %-100 %] 100 %  BP: (111-157)/(57-74) 145/62     Weight: 64 kg (141 lb 1.5 oz)  Body mass index is 23.48 kg/m².    Intake/Output Summary (Last 24 hours) at 7/8/2025 1018  Last data filed at 7/8/2025 0939  Gross per 24 hour   Intake 240 ml   Output 1850 ml   Net -1610 ml         Physical Exam  Constitutional:       General: She is in acute distress.      Appearance: She is ill-appearing.   HENT:      Head: Normocephalic.      Nose: Nose normal.      Mouth/Throat:      Mouth: Mucous membranes are moist.   Cardiovascular:      Rate and Rhythm: Normal rate.   Pulmonary:      Effort: Pulmonary effort is normal.   Abdominal:      General: Abdomen is flat.   Musculoskeletal:         General: Normal range of motion.      Cervical back: Normal range of motion.   Skin:     " General: Skin is warm.   Neurological:      General: No focal deficit present.               Significant Labs: All pertinent labs within the past 24 hours have been reviewed.  CBC: No results for input(s): "WBC", "HGB", "HCT", "PLT" in the last 48 hours.    Significant Imaging: I have reviewed all pertinent imaging results/findings within the past 24 hours.      Assessment & Plan  Hyperkalemia  Hyperkalemia is likely due to ESRD.The patients most recent potassium results are listed below.  Recent Labs     07/07/25  0952   K 3.8     Plan  - Monitor for arrhythmias with EKG and/or continuous telemetry.   - Treat the hyperkalemia with Potassium Binders.   - Monitor potassium: Every 12 hours  - The patient's hyperkalemia is stable  Will get                             urgent HD in ER  Nephrology consulted          S/P CABG x 1  stable    Anemia due to chronic kidney disease, on chronic dialysis  Anemia is likely due to chronic disease due to ESRD. Most recent hemoglobin and hematocrit are listed below.  No results for input(s): "HGB", "HCT" in the last 72 hours.    Plan  - Monitor serial CBC: Daily  - Transfuse PRBC if patient becomes hemodynamically unstable, symptomatic or H/H drops below 7/21.  - Patient has not received any PRBC transfusions to date  - Patient's anemia is currently stable    Paroxysmal atrial fibrillation  Patient has persistent (7 days or more) atrial fibrillation. Patient is currently in sinus rhythm. [id number] Score: 3. The patients heart rate in the last 24 hours is as follows:  Pulse  Min: 78  Max: 84     Antiarrhythmics       Anticoagulants  apixaban tablet 2.5 mg, 2 times daily, Oral  heparin (porcine) injection 1,000 Units, As needed (PRN), Intra-Catheter    Plan  - Replete lytes with a goal of K>4, Mg >2  - Patient is anticoagulated, see medications listed above.  - Patient's afib is currently controlled      Encephalopathy  Uncertain etiology  Will check UA, PD fluid  CT of head " pending    ESRD (end stage renal disease)  Creatine stable for now. BMP reviewed- noted Estimated Creatinine Clearance: 8 mL/min (A) (based on SCr of 6.9 mg/dL (H)). according to latest data. Based on current GFR, CKD stage is end stage.  Monitor UOP and serial BMP and adjust therapy as needed. Renally dose meds. Avoid nephrotoxic medications and procedures.  Moderate malnutrition  Nutrition consulted. Most recent weight and BMI monitored-     Measurements:  Wt Readings from Last 1 Encounters:   07/04/25 64 kg (141 lb 1.5 oz)   Body mass index is 23.48 kg/m².    Patient has been screened and assessed by RD.    Malnutrition Type:  Context: chronic illness  Level: moderate    Malnutrition Characteristic Summary:  Weight Loss (Malnutrition): 20% in 1 year  Energy Intake (Malnutrition): less than 75% for greater than 7 days  Subcutaneous Fat (Malnutrition): mild depletion  Muscle Mass (Malnutrition): mild depletion  Fluid Accumulation (Malnutrition): moderate    Interventions/Recommendations (treatment strategy):  1.)    VTE Risk Mitigation (From admission, onward)           Ordered     heparin (porcine) injection 1,000 Units  As needed (PRN)         07/07/25 1002     apixaban tablet 2.5 mg  2 times daily         07/03/25 1625     IP VTE HIGH RISK PATIENT  Once         07/03/25 1620     Place sequential compression device  Until discontinued         07/03/25 1620     heparin (porcine) injection 1,000 Units  As needed (PRN)         07/03/25 1500                    Discharge Planning   DEVAN: 7/9/2025     Code Status: Full Code   Medical Readiness for Discharge Date:   Discharge Plan A: Home with family                        Randell Garcia MD  Department of Hospital Medicine   [facility] - Acute Medical Stepdown

## 2025-07-08 NOTE — ASSESSMENT & PLAN NOTE
Hyperkalemia is likely due to ESRD.The patients most recent potassium results are listed below.  Recent Labs     07/07/25  0952   K 3.8     Plan  - Monitor for arrhythmias with EKG and/or continuous telemetry.   - Treat the hyperkalemia with Potassium Binders.   - Monitor potassium: Every 12 hours  - The patient's hyperkalemia is stable  Will get                             urgent HD in ER  Nephrology consulted

## 2025-07-08 NOTE — ASSESSMENT & PLAN NOTE
Patient has persistent (7 days or more) atrial fibrillation. Patient is currently in sinus rhythm. [id number] Score: 3. The patients heart rate in the last 24 hours is as follows:  Pulse  Min: 78  Max: 84     Antiarrhythmics       Anticoagulants  apixaban tablet 2.5 mg, 2 times daily, Oral  heparin (porcine) injection 1,000 Units, As needed (PRN), Intra-Catheter    Plan  - Replete lytes with a goal of K>4, Mg >2  - Patient is anticoagulated, see medications listed above.  - Patient's afib is currently controlled

## 2025-07-08 NOTE — SUBJECTIVE & OBJECTIVE
"Interval History: Nauseated this am, awit ID consultt.    Review of Systems  Objective:     Vital Signs (Most Recent):  Temp: 98.6 °F (37 °C) (07/08/25 0755)  Pulse: 81 (07/08/25 0506)  Resp: 10 (07/08/25 0829)  BP: (!) 145/62 (07/08/25 0830)  SpO2: 100 % (07/08/25 0506) Vital Signs (24h Range):  Temp:  [97.3 °F (36.3 °C)-98.6 °F (37 °C)] 98.6 °F (37 °C)  Pulse:  [78-84] 81  Resp:  [10-18] 10  SpO2:  [95 %-100 %] 100 %  BP: (111-157)/(57-74) 145/62     Weight: 64 kg (141 lb 1.5 oz)  Body mass index is 23.48 kg/m².    Intake/Output Summary (Last 24 hours) at 7/8/2025 1018  Last data filed at 7/8/2025 0939  Gross per 24 hour   Intake 240 ml   Output 1850 ml   Net -1610 ml         Physical Exam  Constitutional:       General: She is in acute distress.      Appearance: She is ill-appearing.   HENT:      Head: Normocephalic.      Nose: Nose normal.      Mouth/Throat:      Mouth: Mucous membranes are moist.   Cardiovascular:      Rate and Rhythm: Normal rate.   Pulmonary:      Effort: Pulmonary effort is normal.   Abdominal:      General: Abdomen is flat.   Musculoskeletal:         General: Normal range of motion.      Cervical back: Normal range of motion.   Skin:     General: Skin is warm.   Neurological:      General: No focal deficit present.               Significant Labs: All pertinent labs within the past 24 hours have been reviewed.  CBC: No results for input(s): "WBC", "HGB", "HCT", "PLT" in the last 48 hours.    Significant Imaging: I have reviewed all pertinent imaging results/findings within the past 24 hours.  "

## 2025-07-08 NOTE — CONSULTS
"[facility]  Infectious Disease  Consult Note    Patient Name: Suyapa Connelly  MRN: [id number]  Admission Date: 7/3/2025  Hospital Length of Stay: 3 days  Attending Physician: Randell Garcia MD  Primary Care Provider: Vanessa Noel MD     Isolation Status: Contact    Patient information was obtained from patient, spouse/SO, past medical records, and ER records.      Inpatient consult to Infectious Diseases  Consult performed by: Yun Luke FNP  Consult ordered by: Randell Garcia MD        Assessment/Plan:     Renal/  Urinary tract infection due to ESBL Klebsiella  I have reviewed hospital notes from HM service and other specialty providers. I have also reviewed CBC, CMP/BMP, cultures and imaging with my interpretation as documented.     58 yo F with ESRD, previously on thrice-weekly HD, now transitioning to home PD and currently receiving once weekly HD, last session 1 week ago, presenting to ED with dysuria, weakness, HA, N/V. K 7.0.    U/a with >100 WBC, moderate bacteria, 3+ leukocytes  Urine culture + Klebsiella pneumoniae ESBL    ID consulted for "ESBL UTI"    Recommendations / Plan  D/c Ceftriaxone and vancomycin   Start Ertapenem (renal dose) x 5 days (EOC:7/12/25)    -- Discussed with ID staff            Thank you for your consult. I will sign off. Please contact us if you have any additional questions.    ADRIAN Abdi  Infectious Disease  [facility]    Subjective:     Principal Problem: Hyperkalemia    HPI: 58 yo F with ESRD, previously on thrice-weekly HD, now transitioning to home PD and currently receiving once weekly HD, last session 1 week ago, presenting to ED with dysuria, weakness, HA, N/V. K 7.0. EKG ordered, pending. Calcium, insulin, D50, albuterol, Lokelma ordered for medical management.     Past Medical History:   Diagnosis Date    Anxiety     Chronic pain syndrome     CKD (chronic kidney disease), stage III     CVD " (cardiovascular disease)     Depression     Diabetes mellitus, type 2     GERD (gastroesophageal reflux disease)     Hyperemesis 03/23/2021    Hypokalemia 03/23/2021    Infection of below knee amputation stump 03/12/2022    Osteomyelitis     Osteomyelitis of left foot 04/30/2021    Ulcer of left foot     Vaginal delivery     x1       Past Surgical History:   Procedure Laterality Date    ABOVE-KNEE AMPUTATION Left 5/18/2021    Procedure: AMPUTATION, ABOVE KNEE;  Surgeon: Teddy Huber MD;  Location: [facility] OR [address] [place] [address];  Service: Vascular;  Laterality: Left;    ABOVE-KNEE AMPUTATION Right 3/18/2022    Procedure: AMPUTATION, ABOVE KNEE;  Surgeon: DAYNE Florez II, MD;  Location: [facility] OR [address] [place] [address];  Service: Vascular;  Laterality: Right;    Angiogram - Right Extremity Right 7/9/15    ANGIOGRAM, EXTREMITY, UNILATERAL Left 11/22/2024    Procedure: ANGIOGRAM, EXTREMITY, UNILATERAL;  Surgeon: Yony Lindsey MD;  Location: [facility] OR [address] [place] [address];  Service: Vascular;  Laterality: Left;  Left leg angio, L CFA approach  5.4 min  597.31 mGy  73.8959 Gycm2  20 ml dye    angiogram-left leg  10/6/15    ANGIOGRAPHY OF LOWER EXTREMITY Left 4/29/2021    Procedure: ANGIOGRAM, LOWER EXTREMITY;  Surgeon: Teddy Huber MD;  Location: [facility] OR [address] [place] [address];  Service: Vascular;  Laterality: Left;    BELOW KNEE AMPUTATION OF LOWER EXTREMITY Right 12/28/2021    Procedure: AMPUTATION, BELOW KNEE;  Surgeon: Kaitlyn Rojas MD;  Location: [facility] OR;  Service: General;  Laterality: Right;    CATHETERIZATION OF BOTH LEFT AND RIGHT HEART N/A 12/18/2019    Procedure: CATHETERIZATION, HEART, BOTH LEFT AND RIGHT;  Surgeon: Que Fernando III, MD;  Location: [facility] CATH LAB;  Service: Cardiology;  Laterality: N/A;    CORONARY ANGIOGRAPHY N/A 12/18/2019    Procedure: ANGIOGRAM, CORONARY ARTERY;  Surgeon: Que Fernando III, MD;  Location: [facility] CATH LAB;  Service: Cardiology;  Laterality: N/A;    CORONARY ANGIOGRAPHY INCLUDING  BYPASS GRAFTS WITH CATHETERIZATION OF LEFT HEART N/A 7/28/2020    Procedure: ANGIOGRAM, CORONARY, INCLUDING BYPASS GRAFT, WITH LEFT HEART CATHETERIZATION, 9 am;  Surgeon: Rachel Easley MD;  Location: [facility] CATH LAB;  Service: Cardiology;  Laterality: N/A;    CORONARY ARTERY BYPASS GRAFT (CABG) N/A 1/14/2020    Procedure: CORONARY ARTERY BYPASS GRAFT (CABG) x 1     Off Pump;  Surgeon: Huang Altamirano MD;  Location: [facility] OR [address] [place] [address];  Service: Cardiovascular;  Laterality: N/A;    CREATION OF FEMORAL-TIBIAL ARTERY BYPASS Left 4/29/2021    Procedure: CREATION, BYPASS, ARTERIAL, FEMORAL TO ANTERIOR TIBIAL;  Surgeon: Teddy Huber MD;  [facility];  Service: Vascular;  Laterality: Left;    CREATION OF FEMOROPOPLITEAL ARTERIAL BYPASS USING GRAFT Left 8/18/2020    Procedure: CREATION, BYPASS, ARTERIAL, FEMORAL TO POPLITEAL, USING GRAFT, LEFT LOWER EXTREMITY;  Surgeon: Teddy Huber MD;  Location: [facility] OR;  Service: Vascular;  Laterality: Left;  REQUEST 7:15 A.M. START----COVID NEGATIVE ON 8/17  1ST CASE STARTKENYA DUEÑAS ON 8/7/2020 @ 942AM-LO  RN PREOP 8/12/2020   T/S-----CLEARED BY CARDS-------PENDING INSURANCE    DEBRIDEMENT OF FOOT Left 9/8/2020    Procedure: DEBRIDEMENT, FOOT;  Surgeon: Rosio Mayes DPM;  Location: [facility] OR;  Service: Podiatry;  Laterality: Left;  request neoxx .   RN Pre Op 9-4-2020, Covid negative on 9/5/20. C A    DEBRIDEMENT OF FOOT  3/4/2021    Procedure: DEBRIDEMENT, FOOT;  Surgeon: Teddy Huber MD;  Location: [facility] OR;  Service: Vascular;;    DEBRIDEMENT OF FOOT Left 3/9/2021    Procedure: DEBRIDEMENT, FOOT, bone biopsy;  Surgeon: Rosio Mayes DPM;  Location: [facility] OR;  Service: Podiatry;  Laterality: Left;  Request neoxx---COVID IN AM  REQUESTING NOON START  RN Phone Pre op.On Blood thinners Plavix and Eliquis.  Covid am of surgery. C A    DEBRIDEMENT OF FOOT Left 5/4/2021    Procedure: DEBRIDEMENT, FOOT;  Surgeon: Farooq Morley DPM;  Location:  [facility] OR 2ND FLR;  Service: Podiatry;  Laterality: Left;    ESOPHAGOGASTRODUODENOSCOPY N/A 11/7/2024    Procedure: EGD (ESOPHAGOGASTRODUODENOSCOPY);  Surgeon: Yony Cancino MD;  Location: [facility] ENDO;  Service: Endoscopy;  Laterality: N/A;    INSERTION OF TUNNELED CENTRAL VENOUS HEMODIALYSIS CATHETER N/A 1/27/2020    Procedure: Insertion, Catheter, Central Venous, Hemodialysis;  Surgeon: ESTEBAN Gomez III, MD;  Location: [facility] CATH LAB;  Service: Peripheral Vascular;  Laterality: N/A;    INSERTION OF TUNNELED CENTRAL VENOUS HEMODIALYSIS CATHETER  5/10/2023    Procedure: Insertion, Catheter, Central Venous, Hemodialysis;  Surgeon: Romulo Queen MD;  Location: [facility] CATH LAB;  Service: Cardiology;;    INSERTION, CATHETER, DIALYSIS, PERITONEAL, LAPAROSCOPIC N/A 6/3/2025    Procedure: INSERTION, CATHETER, DIALYSIS, PERITONEAL, LAPAROSCOPIC;  Surgeon: Kvng Tai MD;  Location: [facility] OR;  Service: General;  Laterality: N/A;    IRRIGATION AND DEBRIDEMENT OF LOWER EXTREMITY Left 11/22/2024    Procedure: IRRIGATION AND DEBRIDEMENT, LOWER EXTREMITY;  Surgeon: Yony Lindsey MD;  Location: [facility] OR 2ND FLR;  Service: Vascular;  Laterality: Left;    MAGNETIC RESONANCE IMAGING Left 11/19/2024    Procedure: MRI (Magnetic Resonance Imagine);  Surgeon: Sophia Hernandez;  Location: [facility] SOPHIA;  Service: Anesthesiology;  Laterality: Left;    PERCUTANEOUS TRANSLUMINAL ANGIOPLASTY N/A 3/4/2021    Procedure: PTA (ANGIOPLASTY, PERCUTANEOUS, TRANSLUMINAL);  Surgeon: Teddy Huber MD;  Location: [facility] OR;  Service: Vascular;  Laterality: N/A;    REMOVAL OF ARTERIOVENOUS GRAFT Left 5/27/2021    Procedure: REMOVAL, GRAFT, LEFT LOWER EXTREMITY, WOUND EXPLORATION;  Surgeon: Teddy Huber MD;  Location: [facility] OR 2ND FLR;  Service: Vascular;  Laterality: Left;    REMOVAL OF NAIL OF DIGIT Left 3/9/2021    Procedure: REMOVAL, NAIL, DIGIT;  Surgeon: Rosio Mayes DPM;  Location: [facility] OR;  Service:  Yes Podiatry;  Laterality: Left;    RIGHT HEART CATHETERIZATION Right 5/10/2023    Procedure: INSERTION, CATHETER, RIGHT HEART;  Surgeon: Romulo Queen MD;  Location: [facility] CATH LAB;  Service: Cardiology;  Laterality: Right;    STENT, ILIAC ARTERY Left 11/22/2024    Procedure: STENT, ILIAC ARTERY;  Surgeon: Yony Lindsey MD;  [facility];  Service: Vascular;  Laterality: Left;    THROMBECTOMY Left 3/4/2021    Procedure: THROMBECTOMY, LEFT LOWER EXTREMITY BYPASS GRAFT, ANGIOGRAM, POSSIBLE INTERVENTION, POSSIBLE LEFT LOWER EXTREMITY BYPASS;  Surgeon: Teddy Huber MD;  Location: [facility] OR;  Service: Vascular;  Laterality: Left;    THROMBECTOMY Left 4/29/2021    Procedure: GRAFT THROMBECTOMY, LEFT LOWER EXTREMITY;  Surgeon: Teddy Huber MD;  [facility];  Service: Vascular;  Laterality: Left;  14.5 min  1179.85 mGy  341.01 Gycm2  240 ml dye    THROMBECTOMY  10/22/2021    Procedure: THROMBECTOMY;  Surgeon: Saad Arenas MD;  Location: [facility] CATH LAB/EP;  Service: Cardiology;;       Review of patient's allergies indicates:   Allergen Reactions    Ciprofloxacin Itching    Pcn [penicillins]      Rash; tolerated ceftriaxone on 1/13/20  Tolerating Augmentin November 2024       Medications:  Medications Prior to Admission   Medication Sig    acetaminophen (TYLENOL) 325 MG tablet Take 2 tablets (650 mg total) by mouth every 8 (eight) hours as needed for Pain.    allopurinoL (ZYLOPRIM) 100 MG tablet TAKE 1/2 TABLET(50 MG) BY MOUTH EVERY OTHER DAY    apixaban (ELIQUIS) 2.5 mg Tab Take 1 tablet (2.5 mg total) by mouth 2 (two) times daily.    atorvastatin (LIPITOR) 80 MG tablet Take 1 tablet (80 mg total) by mouth once daily.    blood sugar diagnostic Strp 1 each by Misc.(Non-Drug; Combo Route) route 4 (four) times daily before meals and nightly.    blood-glucose meter Misc Before meals and at bedtime    blood-glucose meter Misc 1 each by Misc.(Non-Drug; Combo Route) route 3  (three) times daily.    blood-glucose sensor (DEXCOM G7 SENSOR) Radha Apply device and replace every 10 days    [] calcitRIOL (ROCALTROL) 0.5 MCG Cap Take 1 capsule (0.5 mcg total) by mouth once daily.    calcium acetate,phosphat bind, (PHOSLO) 667 mg tablet Take 1 tablet (667 mg total) by mouth 2 (two) times a day.    carvediloL (COREG) 6.25 MG tablet Take 6.25 mg by mouth 2 (two) times daily.    cinacalcet (SENSIPAR) 30 MG Tab Take 1 tablet (30 mg total) by mouth every Mon, Wed, Fri.    clopidogreL (PLAVIX) 75 mg tablet Take 1 tablet (75 mg total) by mouth once daily.    famotidine (PEPCID) 20 MG tablet TAKE 1 TABLET(20 MG) BY MOUTH TWICE DAILY    furosemide (LASIX) 40 MG tablet Take 1 tablet (40 mg total) by mouth 2 (two) times daily.    hydrALAZINE (APRESOLINE) 10 MG tablet Take 1 tablet (10 mg total) by mouth every 12 (twelve) hours.    [] HYDROcodone-acetaminophen (NORCO) 5-325 mg per tablet Take 1 tablet by mouth every 6 (six) hours as needed for Pain.    insulin aspart U-100 (NOVOLOG FLEXPEN U-100 INSULIN) 100 unit/mL (3 mL) InPn pen Inject 3 Units into the skin 3 (three) times daily with meals.    insulin aspart U-100 (NOVOLOG) 100 unit/mL (3 mL) InPn pen Inject 5 Units into the skin 3 times daily with meals. **MODERATE CORRECTION DOSE**  151-200 mg/dL Pre-meal: 2 units 2200: 1 unit; 201-250 mg/dL Pre-meal 4 units 2200: 2 units; 251-300mg/dL Pre-meal 6 units 2200: 3 units; 301-350mg/dL Pre-meal 8 units 2200: 4 units; >350mg/dL Pre-meal 10 U 2200: 5 U    insulin glargine U-100, Lantus, 100 unit/mL (3 mL) SubQ InPn pen Inject 5 Units into the skin every evening.    isosorbide dinitrate (ISORDIL) 10 MG tablet Take 0.5 tablets (5 mg total) by mouth 2 (two) times daily.    lancets 33 gauge Misc TEST 3 TIMES DAILY    multivit with min-folic acid (ONE DAILY WOMENS 50 PLUS) 0.4 mg Tab Take 1 tablet by mouth once daily.    mupirocin (BACTROBAN) 2 % ointment Apply topically 2 (two) times daily.     "OZEMPIC 0.25 mg or 0.5 mg (2 mg/3 mL) pen injector Inject 0.5 mg into the skin every 7 days.    pen needle, diabetic (BD MIGUEL 2ND GEN PEN NEEDLE) 32 gauge x 5/32" Ndle Use to inject insulin once daily    pen needle, diabetic 33 gauge x 5/32" Ndle 1 each by Misc.(Non-Drug; Combo Route) route 4 (four) times daily before meals and nightly.    pregabalin (LYRICA) 75 MG capsule Take 1 capsule (75 mg total) by mouth Daily.    RENVELA 2.4 gram PwPk Take 1 packet by mouth 3 (three) times daily with meals.    sacubitriL-valsartan (ENTRESTO) 24-26 mg per tablet Take 1 tablet by mouth 2 (two) times daily.    senna-docusate (PERICOLACE) 8.6-50 mg per tablet Take 1 tablet by mouth 2 (two) times daily.    sertraline (ZOLOFT) 100 MG tablet Take 1 tablet (100 mg total) by mouth once daily.    sodium bicarbonate 650 MG tablet TAKE 1 TABLET(650 MG) BY MOUTH THREE TIMES DAILY    traZODone (DESYREL) 50 MG tablet TAKE 1/2 TABLET(25 MG) BY MOUTH EVERY EVENING    zinc oxide 10 % Oint Apply 1 Application topically 2 (two) times a day.     Antibiotics (From admission, onward)      Start     Stop Route Frequency Ordered    07/08/25 1200  ertapenem (INVANZ) 500 mg in 0.9% NaCl 100 mL IVPB         -- IV Every 24 hours (non-standard times) 07/08/25 1020    07/05/25 1600  erythromycin 5 mg/gram (0.5 %) ophthalmic ointment         -- RIGHT EYE Every 6 hours 07/05/25 1548          Antifungals (From admission, onward)      Start     Stop Route Frequency Ordered    07/07/25 1030  fluconazole tablet 100 mg         -- Oral Daily 07/07/25 0930          Antivirals (From admission, onward)      None             Immunization History   Administered Date(s) Administered    COVID-19, MRNA, LN-S, PF (Pfizer) (Purple Cap) 03/09/2021, 03/30/2021    COVID-19, mRNA, LNP-S, PF (Moderna) Ages 12+ 12/18/2024    Hepatitis B (recombinant) Adjuvanted, 2 dose 01/12/2024, 06/17/2024, 06/19/2024    Hepatitis B, Adult 06/02/2023, 07/04/2023, 08/04/2023, 01/04/2024    " Influenza (FLUBLOK) - Quadrivalent - Recombinant - PF *Preferred* (egg allergy) 03/27/2024    Influenza - Trivalent - Fluarix, Flulaval, Fluzone, Afluria - PF 12/18/2024    PPD Test 05/14/2024, 11/07/2024    Pneumococcal Conjugate - 20 Valent 10/15/2024       Family History       Problem Relation (Age of Onset)    Diabetes Mother, Father, Paternal Grandmother    Heart disease Maternal Grandmother    No Known Problems Maternal Grandfather, Paternal Grandfather          Social History     Socioeconomic History    Marital status:    Occupational History    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      Social Drivers of Health     Financial Resource Strain: Low Risk  (7/4/2025)    Overall Financial Resource Strain (CARDIA)     Difficulty of Paying Living Expenses: Not very hard   Food Insecurity: No Food Insecurity (7/4/2025)    Hunger Vital Sign     Worried About Running Out of Food in the Last Year: Never true     Ran Out of Food in the Last Year: Never true   Transportation Needs: No Transportation Needs (7/4/2025)    PRAPARE - Transportation     Lack of Transportation (Medical): No     Lack of Transportation (Non-Medical): No   Physical Activity: Inactive (7/4/2025)    Exercise Vital Sign     Days of Exercise per Week: 0 days     Minutes of Exercise per Session: 0 min   Stress: No Stress Concern Present (7/4/2025)    [employer] [place] - Occupational Stress Questionnaire     Feeling of Stress : Not at all   Housing Stability: Low Risk  (7/4/2025)    Housing Stability Vital Sign     Unable to Pay for Housing in the Last Year: No     Number of Times Moved in the Last Year: 0     Homeless in the Last  "Year: No     Review of Systems   Constitutional:  Positive for fatigue. Negative for appetite change, chills and fever.   Respiratory:  Negative for chest tightness, shortness of breath and wheezing.    Cardiovascular:  Negative for chest pain and palpitations.   Gastrointestinal:  Positive for vomiting. Negative for abdominal distention, abdominal pain, constipation, diarrhea and nausea.   Genitourinary:  Positive for decreased urine volume, difficulty urinating, dysuria and urgency. Negative for flank pain, frequency and hematuria.   Musculoskeletal:  Negative for back pain.   Skin:  Negative for rash.   Psychiatric/Behavioral:  Positive for confusion.      Objective:     Vital Signs (Most Recent):  Temp: 98.4 °F (36.9 °C) (07/08/25 1049)  Pulse: 83 (07/08/25 1059)  Resp: 13 (07/08/25 1257)  BP: (!) 114/56 (07/08/25 1049)  SpO2: 100 % (07/08/25 1059) Vital Signs (24h Range):  Temp:  [97.3 °F (36.3 °C)-98.6 °F (37 °C)] 98.4 °F (36.9 °C)  Pulse:  [79-85] 83  Resp:  [10-18] 13  SpO2:  [91 %-100 %] 100 %  BP: (114-157)/(56-74) 114/56     Weight: 64 kg (141 lb 1.5 oz)  Body mass index is 23.48 kg/m².    Estimated Creatinine Clearance: 8 mL/min (A) (based on SCr of 6.9 mg/dL (H)).     Physical Exam  Vitals and nursing note reviewed.   Constitutional:       General: She is not in acute distress.     Appearance: She is not ill-appearing.   Cardiovascular:      Rate and Rhythm: Normal rate and regular rhythm.      Pulses: Normal pulses.      Heart sounds: No murmur heard.  Pulmonary:      Effort: No respiratory distress.   Abdominal:      General: There is no distension.      Tenderness: There is no abdominal tenderness.   Musculoskeletal:         General: No swelling or tenderness.   Skin:     General: Skin is warm and dry.   Neurological:      Mental Status: She is alert.   Psychiatric:         Behavior: Behavior normal.          Significant Labs: Blood Culture: No results for input(s): "LABBLOO" in the last 4320 " "hours.  CBC: No results for input(s): "WBC", "HGB", "HCT", "PLT" in the last 48 hours.  CMP:   Recent Labs   Lab 07/07/25  0952   *   K 3.8   CL 95   CO2 25   *   BUN 46*   CREATININE 6.9*   CALCIUM 7.7*   ALBUMIN 2.2*   ANIONGAP 13     Urine Culture:   Recent Labs   Lab 02/11/25  1000 07/04/25  0448   LABURIN KLEBSIELLA PNEUMONIAE ESBL  10,000 - 49,999 cfu/ml  No other significant isolate  * >100,000 cfu/ml Klebsiella pneumoniae esbl*     Urine Studies:   Recent Labs   Lab 02/11/25  1000 07/04/25  0448   COLORU Yellow Brown*   APPEARANCEUA Hazy* Cloudy*   PHUR 6.0 6.0   SPECGRAV 1.020 1.015   PROTEINUA 2+* 3+*   GLUCUA Negative Negative   KETONESU Negative  --    BILIRUBINUA Negative Negative   OCCULTUA 1+* 2+*   NITRITE Negative Negative   UROBILINOGEN  --  Negative   LEUKOCYTESUR 3+* 3+*   RBCUA 10* 49*   WBCUA >100* >100*   BACTERIA Occasional Moderate*   SQUAMEPITHEL 2  --    HYALINECASTS 0 0     Wound Culture: No results for input(s): "LABAERO" in the last 4320 hours.  All pertinent labs within the past 24 hours have been reviewed.    Significant Imaging: I have reviewed all pertinent imaging results/findings within the past 24 hours.              "

## 2025-07-08 NOTE — ASSESSMENT & PLAN NOTE
>>ASSESSMENT AND PLAN FOR S/P CABG X 1 WRITTEN ON 7/8/2025 10:19 AM BY SUHAS ALVAREZ MD    stable

## 2025-07-08 NOTE — SUBJECTIVE & OBJECTIVE
Past Medical History:   Diagnosis Date    Anxiety     Chronic pain syndrome     CKD (chronic kidney disease), stage III     CVD (cardiovascular disease)     Depression     Diabetes mellitus, type 2     GERD (gastroesophageal reflux disease)     Hyperemesis 03/23/2021    Hypokalemia 03/23/2021    Infection of below knee amputation stump 03/12/2022    Osteomyelitis     Osteomyelitis of left foot 04/30/2021    Ulcer of left foot     Vaginal delivery     x1       Past Surgical History:   Procedure Laterality Date    ABOVE-KNEE AMPUTATION Left 5/18/2021    Procedure: AMPUTATION, ABOVE KNEE;  Surgeon: Teddy Huber MD;  Location: [address];  Service: Vascular;  Laterality: Left;    ABOVE-KNEE AMPUTATION Right 3/18/2022    Procedure: AMPUTATION, ABOVE KNEE;  Surgeon: DAYNE Florez II, MD;  Location: [address];  Service: Vascular;  Laterality: Right;    Angiogram - Right Extremity Right 7/9/15    ANGIOGRAM, EXTREMITY, UNILATERAL Left 11/22/2024    Procedure: ANGIOGRAM, EXTREMITY, UNILATERAL;  Surgeon: Yony Lindsey MD;  Location: [address];  Service: Vascular;  Laterality: Left;  Left leg angio, L CFA approach  5.4 min  597.31 mGy  73.8959 Gycm2  20 ml dye    angiogram-left leg  10/6/15    ANGIOGRAPHY OF LOWER EXTREMITY Left 4/29/2021    Procedure: ANGIOGRAM, LOWER EXTREMITY;  Surgeon: Teddy Huber MD;  Location: [address];  Service: Vascular;  Laterality: Left;    BELOW KNEE AMPUTATION OF LOWER EXTREMITY Right 12/28/2021    Procedure: AMPUTATION, BELOW KNEE;  Surgeon: Kaitlyn Rojas MD;  Location: [facility] OR;  Service: General;  Laterality: Right;    CATHETERIZATION OF BOTH LEFT AND RIGHT HEART N/A 12/18/2019    Procedure: CATHETERIZATION, HEART, BOTH LEFT AND RIGHT;  Surgeon: Que Fernando III, MD;  Location: [facility] CATH LAB;  Service: Cardiology;  Laterality: N/A;    CORONARY ANGIOGRAPHY N/A 12/18/2019    Procedure: ANGIOGRAM, CORONARY ARTERY;  Surgeon:  Que Fernando III, MD;  Location: [facility] CATH LAB;  Service: Cardiology;  Laterality: N/A;    CORONARY ANGIOGRAPHY INCLUDING BYPASS GRAFTS WITH CATHETERIZATION OF LEFT HEART N/A 7/28/2020    Procedure: ANGIOGRAM, CORONARY, INCLUDING BYPASS GRAFT, WITH LEFT HEART CATHETERIZATION, 9 am;  Surgeon: Rachel Easley MD;  Location: [facility] CATH LAB;  Service: Cardiology;  Laterality: N/A;    CORONARY ARTERY BYPASS GRAFT (CABG) N/A 1/14/2020    Procedure: CORONARY ARTERY BYPASS GRAFT (CABG) x 1     Off Pump;  Surgeon: Huang Altamirano MD;  Location: [facility] OR [address] [place] [address];  Service: Cardiovascular;  Laterality: N/A;    CREATION OF FEMORAL-TIBIAL ARTERY BYPASS Left 4/29/2021    Procedure: CREATION, BYPASS, ARTERIAL, FEMORAL TO ANTERIOR TIBIAL;  Surgeon: Teddy Huber MD;  Location: [facility] OR [address] [place] [address];  Service: Vascular;  Laterality: Left;    CREATION OF FEMOROPOPLITEAL ARTERIAL BYPASS USING GRAFT Left 8/18/2020    Procedure: CREATION, BYPASS, ARTERIAL, FEMORAL TO POPLITEAL, USING GRAFT, LEFT LOWER EXTREMITY;  Surgeon: Teddy Huber MD;  Location: [facility];  Service: Vascular;  Laterality: Left;  REQUEST 7:15 A.M. START----COVID NEGATIVE ON 8/17 1ST CASE STARTKENYA DUEÑAS ON 8/7/2020 @ 942AM-LO  RN PREOP 8/12/2020   T/S-----CLEARED BY CARDS-------PENDING INSURANCE    DEBRIDEMENT OF FOOT Left 9/8/2020    Procedure: DEBRIDEMENT, FOOT;  Surgeon: Rosio Mayes DPM;  Location: [facility] OR;  Service: Podiatry;  Laterality: Left;  request neoxx .   RN Pre Op 9-4-2020, Covid negative on 9/5/20. C A    DEBRIDEMENT OF FOOT  3/4/2021    Procedure: DEBRIDEMENT, FOOT;  Surgeon: Teddy Huber MD;  Location: [facility] OR;  Service: Vascular;;    DEBRIDEMENT OF FOOT Left 3/9/2021    Procedure: DEBRIDEMENT, FOOT, bone biopsy;  Surgeon: Rosio Mayes DPM;  Location: [facility] OR;  Service: Podiatry;  Laterality: Left;  Request neoxx---COVID IN AM  REQUESTING NOON START  RN Phone Pre op.On Blood thinners Plavix and Eliquis.  Covid am of  surgery. C A    DEBRIDEMENT OF FOOT Left 5/4/2021    Procedure: DEBRIDEMENT, FOOT;  Surgeon: Farooq Morley DPM;  Location: [facility] OR [facility] FLR;  Service: Podiatry;  Laterality: Left;    ESOPHAGOGASTRODUODENOSCOPY N/A 11/7/2024    Procedure: EGD (ESOPHAGOGASTRODUODENOSCOPY);  Surgeon: Yony Cancino MD;  Location: [facility] ENDO;  Service: Endoscopy;  Laterality: N/A;    INSERTION OF TUNNELED CENTRAL VENOUS HEMODIALYSIS CATHETER N/A 1/27/2020    Procedure: Insertion, Catheter, Central Venous, Hemodialysis;  Surgeon: ESTEBAN Gomez III, MD;  Location: [facility] CATH LAB;  Service: Peripheral Vascular;  Laterality: N/A;    INSERTION OF TUNNELED CENTRAL VENOUS HEMODIALYSIS CATHETER  5/10/2023    Procedure: Insertion, Catheter, Central Venous, Hemodialysis;  Surgeon: Romulo Queen MD;  Location: [facility] CATH LAB;  Service: Cardiology;;    INSERTION, CATHETER, DIALYSIS, PERITONEAL, LAPAROSCOPIC N/A 6/3/2025    Procedure: INSERTION, CATHETER, DIALYSIS, PERITONEAL, LAPAROSCOPIC;  Surgeon: Kvng Tai MD;  Location: [facility] OR;  Service: General;  Laterality: N/A;    IRRIGATION AND DEBRIDEMENT OF LOWER EXTREMITY Left 11/22/2024    Procedure: IRRIGATION AND DEBRIDEMENT, LOWER EXTREMITY;  Surgeon: Yony Lindsey MD;  [address];  Service: Vascular;  Laterality: Left;    MAGNETIC RESONANCE IMAGING Left 11/19/2024    Procedure: MRI (Magnetic Resonance Imagine);  Surgeon: Sophia Hernandez;  Location: [facility];  Service: Anesthesiology;  Laterality: Left;    PERCUTANEOUS TRANSLUMINAL ANGIOPLASTY N/A 3/4/2021    Procedure: PTA (ANGIOPLASTY, PERCUTANEOUS, TRANSLUMINAL);  Surgeon: Teddy Huber MD;  Location: [facility] OR;  Service: Vascular;  Laterality: N/A;    REMOVAL OF ARTERIOVENOUS GRAFT Left 5/27/2021    Procedure: REMOVAL, GRAFT, LEFT LOWER EXTREMITY, WOUND EXPLORATION;  Surgeon: Teddy Huber MD;  [address];  Service: Vascular;  Laterality: Left;    REMOVAL OF NAIL  OF DIGIT Left 3/9/2021    Procedure: REMOVAL, NAIL, DIGIT;  Surgeon: Rosio Mayes DPM;  Location: [facility] OR;  Service: Podiatry;  Laterality: Left;    RIGHT HEART CATHETERIZATION Right 5/10/2023    Procedure: INSERTION, CATHETER, RIGHT HEART;  Surgeon: Romulo Queen MD;  Location: [facility] CATH LAB;  Service: Cardiology;  Laterality: Right;    STENT, ILIAC ARTERY Left 11/22/2024    Procedure: STENT, ILIAC ARTERY;  Surgeon: Yony Lindsey MD;  Location: [facility] OR [facility] FLR;  Service: Vascular;  Laterality: Left;    THROMBECTOMY Left 3/4/2021    Procedure: THROMBECTOMY, LEFT LOWER EXTREMITY BYPASS GRAFT, ANGIOGRAM, POSSIBLE INTERVENTION, POSSIBLE LEFT LOWER EXTREMITY BYPASS;  Surgeon: Teddy Huber MD;  Location: [facility] OR;  Service: Vascular;  Laterality: Left;    THROMBECTOMY Left 4/29/2021    Procedure: GRAFT THROMBECTOMY, LEFT LOWER EXTREMITY;  Surgeon: Teddy Huber MD;  Location: [facility] OR [facility] FLR;  Service: Vascular;  Laterality: Left;  14.5 min  1179.85 mGy  341.01 Gycm2  240 ml dye    THROMBECTOMY  10/22/2021    Procedure: THROMBECTOMY;  Surgeon: Saad Arenas MD;  Location: [facility] CATH LAB/EP;  Service: Cardiology;;       Review of patient's allergies indicates:   Allergen Reactions    Ciprofloxacin Itching    Pcn [penicillins]      Rash; tolerated ceftriaxone on 1/13/20  Tolerating Augmentin November 2024       Medications:  Medications Prior to Admission   Medication Sig    acetaminophen (TYLENOL) 325 MG tablet Take 2 tablets (650 mg total) by mouth every 8 (eight) hours as needed for Pain.    allopurinoL (ZYLOPRIM) 100 MG tablet TAKE 1/2 TABLET(50 MG) BY MOUTH EVERY OTHER DAY    apixaban (ELIQUIS) 2.5 mg Tab Take 1 tablet (2.5 mg total) by mouth 2 (two) times daily.    atorvastatin (LIPITOR) 80 MG tablet Take 1 tablet (80 mg total) by mouth once daily.    blood sugar diagnostic Strp 1 each by Misc.(Non-Drug; Combo Route) route 4 (four) times daily before meals and nightly.     blood-glucose meter Misc Before meals and at bedtime    blood-glucose meter Misc 1 each by Misc.(Non-Drug; Combo Route) route 3 (three) times daily.    blood-glucose sensor (DEXCOM G7 SENSOR) Radha Apply device and replace every 10 days    [] calcitRIOL (ROCALTROL) 0.5 MCG Cap Take 1 capsule (0.5 mcg total) by mouth once daily.    calcium acetate,phosphat bind, (PHOSLO) 667 mg tablet Take 1 tablet (667 mg total) by mouth 2 (two) times a day.    carvediloL (COREG) 6.25 MG tablet Take 6.25 mg by mouth 2 (two) times daily.    cinacalcet (SENSIPAR) 30 MG Tab Take 1 tablet (30 mg total) by mouth every Mon, Wed, Fri.    clopidogreL (PLAVIX) 75 mg tablet Take 1 tablet (75 mg total) by mouth once daily.    famotidine (PEPCID) 20 MG tablet TAKE 1 TABLET(20 MG) BY MOUTH TWICE DAILY    furosemide (LASIX) 40 MG tablet Take 1 tablet (40 mg total) by mouth 2 (two) times daily.    hydrALAZINE (APRESOLINE) 10 MG tablet Take 1 tablet (10 mg total) by mouth every 12 (twelve) hours.    [] HYDROcodone-acetaminophen (NORCO) 5-325 mg per tablet Take 1 tablet by mouth every 6 (six) hours as needed for Pain.    insulin aspart U-100 (NOVOLOG FLEXPEN U-100 INSULIN) 100 unit/mL (3 mL) InPn pen Inject 3 Units into the skin 3 (three) times daily with meals.    insulin aspart U-100 (NOVOLOG) 100 unit/mL (3 mL) InPn pen Inject 5 Units into the skin 3 times daily with meals. **MODERATE CORRECTION DOSE**  151-200 mg/dL Pre-meal: 2 units 2200: 1 unit; 201-250 mg/dL Pre-meal 4 units 2200: 2 units; 251-300mg/dL Pre-meal 6 units 2200: 3 units; 301-350mg/dL Pre-meal 8 units 2200: 4 units; >350mg/dL Pre-meal 10 U 2200: 5 U    insulin glargine U-100, Lantus, 100 unit/mL (3 mL) SubQ InPn pen Inject 5 Units into the skin every evening.    isosorbide dinitrate (ISORDIL) 10 MG tablet Take 0.5 tablets (5 mg total) by mouth 2 (two) times daily.    lancets 33 gauge Misc TEST 3 TIMES DAILY    multivit with min-folic acid (ONE DAILY WOMENS 50 PLUS)  "0.4 mg Tab Take 1 tablet by mouth once daily.    mupirocin (BACTROBAN) 2 % ointment Apply topically 2 (two) times daily.    OZEMPIC 0.25 mg or 0.5 mg (2 mg/3 mL) pen injector Inject 0.5 mg into the skin every 7 days.    pen needle, diabetic (BD MIGUEL 2ND GEN PEN NEEDLE) 32 gauge x 5/32" Ndle Use to inject insulin once daily    pen needle, diabetic 33 gauge x 5/32" Ndle 1 each by Misc.(Non-Drug; Combo Route) route 4 (four) times daily before meals and nightly.    pregabalin (LYRICA) 75 MG capsule Take 1 capsule (75 mg total) by mouth Daily.    RENVELA 2.4 gram PwPk Take 1 packet by mouth 3 (three) times daily with meals.    sacubitriL-valsartan (ENTRESTO) 24-26 mg per tablet Take 1 tablet by mouth 2 (two) times daily.    senna-docusate (PERICOLACE) 8.6-50 mg per tablet Take 1 tablet by mouth 2 (two) times daily.    sertraline (ZOLOFT) 100 MG tablet Take 1 tablet (100 mg total) by mouth once daily.    sodium bicarbonate 650 MG tablet TAKE 1 TABLET(650 MG) BY MOUTH THREE TIMES DAILY    traZODone (DESYREL) 50 MG tablet TAKE 1/2 TABLET(25 MG) BY MOUTH EVERY EVENING    zinc oxide 10 % Oint Apply 1 Application topically 2 (two) times a day.     Antibiotics (From admission, onward)      Start     Stop Route Frequency Ordered    07/08/25 1200  ertapenem (INVANZ) 500 mg in 0.9% NaCl 100 mL IVPB         -- IV Every 24 hours (non-standard times) 07/08/25 1020    07/05/25 1600  erythromycin 5 mg/gram (0.5 %) ophthalmic ointment         -- RIGHT EYE Every 6 hours 07/05/25 1548          Antifungals (From admission, onward)      Start     Stop Route Frequency Ordered    07/07/25 1030  fluconazole tablet 100 mg         -- Oral Daily 07/07/25 0930          Antivirals (From admission, onward)      None             Immunization History   Administered Date(s) Administered    COVID-19, MRNA, LN-S, PF (Pfizer) (Purple Cap) 03/09/2021, 03/30/2021    COVID-19, mRNA, LNP-S, PF (Moderna) Ages 12+ 12/18/2024    Hepatitis B (recombinant) " Adjuvanted, 2 dose 01/12/2024, 06/17/2024, 06/19/2024    Hepatitis B, Adult 06/02/2023, 07/04/2023, 08/04/2023, 01/04/2024    Influenza (FLUBLOK) - Quadrivalent - Recombinant - PF *Preferred* (egg allergy) 03/27/2024    Influenza - Trivalent - Fluarix, Flulaval, Fluzone, Afluria - PF 12/18/2024    PPD Test 05/14/2024, 11/07/2024    Pneumococcal Conjugate - 20 Valent 10/15/2024       Family History       Problem Relation (Age of Onset)    Diabetes Mother, Father, Paternal Grandmother    Heart disease Maternal Grandmother    No Known Problems Maternal Grandfather, Paternal Grandfather          Social History     Socioeconomic History    Marital status:    Occupational History    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      Social Drivers of Health     Financial Resource Strain: Low Risk  (7/4/2025)    Overall Financial Resource Strain (CARDIA)     Difficulty of Paying Living Expenses: Not very hard   Food Insecurity: No Food Insecurity (7/4/2025)    Hunger Vital Sign     Worried About Running Out of Food in the Last Year: Never true     Ran Out of Food in the Last Year: Never true   Transportation Needs: No Transportation Needs (7/4/2025)    PRAPARE - Transportation     Lack of Transportation (Medical): No     Lack of Transportation (Non-Medical): No   Physical Activity: Inactive (7/4/2025)    Exercise Vital Sign     Days of Exercise per Week: 0 days     Minutes of Exercise per Session: 0 min   Stress: No Stress Concern Present (7/4/2025)    [employer] - Occupational Stress Questionnaire     Feeling of Stress : Not at all   Housing Stability: Low Risk  (7/4/2025)    Housing Stability Vital  Sign     Unable to Pay for Housing in the Last Year: No     Number of Times Moved in the Last Year: 0     Homeless in the Last Year: No     Review of Systems   Constitutional:  Positive for fatigue. Negative for appetite change, chills and fever.   Respiratory:  Negative for chest tightness, shortness of breath and wheezing.    Cardiovascular:  Negative for chest pain and palpitations.   Gastrointestinal:  Positive for vomiting. Negative for abdominal distention, abdominal pain, constipation, diarrhea and nausea.   Genitourinary:  Positive for decreased urine volume, difficulty urinating, dysuria and urgency. Negative for flank pain, frequency and hematuria.   Musculoskeletal:  Negative for back pain.   Skin:  Negative for rash.   Psychiatric/Behavioral:  Positive for confusion.      Objective:     Vital Signs (Most Recent):  Temp: 98.4 °F (36.9 °C) (07/08/25 1049)  Pulse: 83 (07/08/25 1059)  Resp: 13 (07/08/25 1257)  BP: (!) 114/56 (07/08/25 1049)  SpO2: 100 % (07/08/25 1059) Vital Signs (24h Range):  Temp:  [97.3 °F (36.3 °C)-98.6 °F (37 °C)] 98.4 °F (36.9 °C)  Pulse:  [79-85] 83  Resp:  [10-18] 13  SpO2:  [91 %-100 %] 100 %  BP: (114-157)/(56-74) 114/56     Weight: 64 kg (141 lb 1.5 oz)  Body mass index is 23.48 kg/m².    Estimated Creatinine Clearance: 8 mL/min (A) (based on SCr of 6.9 mg/dL (H)).     Physical Exam  Vitals and nursing note reviewed.   Constitutional:       General: She is not in acute distress.     Appearance: She is not ill-appearing.   Cardiovascular:      Rate and Rhythm: Normal rate and regular rhythm.      Pulses: Normal pulses.      Heart sounds: No murmur heard.  Pulmonary:      Effort: No respiratory distress.   Abdominal:      General: There is no distension.      Tenderness: There is no abdominal tenderness.   Musculoskeletal:         General: No swelling or tenderness.   Skin:     General: Skin is warm and dry.   Neurological:      Mental Status: She is alert.   Psychiatric:          "Behavior: Behavior normal.          Significant Labs: Blood Culture: No results for input(s): "LABBLOO" in the last 4320 hours.  CBC: No results for input(s): "WBC", "HGB", "HCT", "PLT" in the last 48 hours.  CMP:   Recent Labs   Lab 07/07/25  0952   *   K 3.8   CL 95   CO2 25   *   BUN 46*   CREATININE 6.9*   CALCIUM 7.7*   ALBUMIN 2.2*   ANIONGAP 13     Urine Culture:   Recent Labs   Lab 02/11/25  1000 07/04/25  0448   LABURIN KLEBSIELLA PNEUMONIAE ESBL  10,000 - 49,999 cfu/ml  No other significant isolate  * >100,000 cfu/ml Klebsiella pneumoniae esbl*     Urine Studies:   Recent Labs   Lab 02/11/25  1000 07/04/25  0448   COLORU Yellow Brown*   APPEARANCEUA Hazy* Cloudy*   PHUR 6.0 6.0   SPECGRAV 1.020 1.015   PROTEINUA 2+* 3+*   GLUCUA Negative Negative   KETONESU Negative  --    BILIRUBINUA Negative Negative   OCCULTUA 1+* 2+*   NITRITE Negative Negative   UROBILINOGEN  --  Negative   LEUKOCYTESUR 3+* 3+*   RBCUA 10* 49*   WBCUA >100* >100*   BACTERIA Occasional Moderate*   SQUAMEPITHEL 2  --    HYALINECASTS 0 0     Wound Culture: No results for input(s): "LABAERO" in the last 4320 hours.  All pertinent labs within the past 24 hours have been reviewed.    Significant Imaging: I have reviewed all pertinent imaging results/findings within the past 24 hours.  "

## 2025-07-08 NOTE — ASSESSMENT & PLAN NOTE
"Anemia is likely due to chronic disease due to ESRD. Most recent hemoglobin and hematocrit are listed below.  No results for input(s): "HGB", "HCT" in the last 72 hours.    Plan  - Monitor serial CBC: Daily  - Transfuse PRBC if patient becomes hemodynamically unstable, symptomatic or H/H drops below 7/21.  - Patient has not received any PRBC transfusions to date  - Patient's anemia is currently stable    "

## 2025-07-08 NOTE — PLAN OF CARE
JESUS to make appt.    Pt aaox3. Confusion about date/time noted. Easily reorientated. >95% on RA with no distress noted. Denies any pain or discomfort at present moment. Tele-NSR. Refused select BP meds stating that she didn't want her BP to fall too low. Education provided. VSS. No acute events during this shift. Call light and personal items within reach. Bed lowered and locked with bed alarm active.  at bedside providing comfort. No known needs voiced at this time.      Problem: Skin Injury Risk Increased  Goal: Skin Health and Integrity  Outcome: Progressing     Problem: Hemodialysis  Goal: Safe, Effective Therapy Delivery  Outcome: Progressing  Goal: Effective Tissue Perfusion  Outcome: Progressing  Goal: Absence of Infection Signs and Symptoms  Outcome: Progressing     Problem: Adult Inpatient Plan of Care  Goal: Plan of Care Review  Outcome: Progressing  Goal: Patient-Specific Goal (Individualized)  Outcome: Progressing  Goal: Absence of Hospital-Acquired Illness or Injury  Outcome: Progressing  Goal: Optimal Comfort and Wellbeing  Outcome: Progressing  Goal: Readiness for Transition of Care  Outcome: Progressing     Problem: Diabetes Comorbidity  Goal: Blood Glucose Level Within Targeted Range  Outcome: Progressing     Problem: Sepsis/Septic Shock  Goal: Optimal Coping  Outcome: Progressing  Goal: Absence of Bleeding  Outcome: Progressing  Goal: Blood Glucose Level Within Targeted Range  Outcome: Progressing  Goal: Absence of Infection Signs and Symptoms  Outcome: Progressing  Goal: Optimal Nutrition Intake  Outcome: Progressing     Problem: Infection  Goal: Absence of Infection Signs and Symptoms  Outcome: Progressing     Problem: Fall Injury Risk  Goal: Absence of Fall and Fall-Related Injury  Outcome: Progressing     Problem: Wound  Goal: Optimal Coping  Outcome: Progressing

## 2025-07-08 NOTE — ASSESSMENT & PLAN NOTE
Nutrition consulted. Most recent weight and BMI monitored-     Measurements:  Wt Readings from Last 1 Encounters:   07/04/25 64 kg (141 lb 1.5 oz)   Body mass index is 23.48 kg/m².    Patient has been screened and assessed by RD.    Malnutrition Type:  Context: chronic illness  Level: moderate    Malnutrition Characteristic Summary:  Weight Loss (Malnutrition): 20% in 1 year  Energy Intake (Malnutrition): less than 75% for greater than 7 days  Subcutaneous Fat (Malnutrition): mild depletion  Muscle Mass (Malnutrition): mild depletion  Fluid Accumulation (Malnutrition): moderate    Interventions/Recommendations (treatment strategy):  1.)

## 2025-07-08 NOTE — PLAN OF CARE
Problem: Skin Injury Risk Increased  Goal: Skin Health and Integrity  7/8/2025 1619 by Adri Lizarraga RN  Outcome: Progressing  7/8/2025 1545 by Adri Lizarraga RN  Outcome: Progressing     Problem: Hemodialysis  Goal: Safe, Effective Therapy Delivery  7/8/2025 1619 by Adri Lizarraga RN  Outcome: Progressing  7/8/2025 1545 by Adri Lizarraga RN  Outcome: Progressing  Goal: Effective Tissue Perfusion  7/8/2025 1619 by Adri Lizarraga RN  Outcome: Progressing  7/8/2025 1545 by Adri Lizarraga RN  Outcome: Progressing  Goal: Absence of Infection Signs and Symptoms  7/8/2025 1619 by Adri Lizarraga RN  Outcome: Progressing  7/8/2025 1545 by Adri Lizarraga RN  Outcome: Progressing     Problem: Adult Inpatient Plan of Care  Goal: Plan of Care Review  7/8/2025 1619 by Adri Lizarraga RN  Outcome: Progressing  7/8/2025 1545 by Adri Lizarraga RN  Outcome: Progressing  Goal: Patient-Specific Goal (Individualized)  7/8/2025 1619 by Adri Lizarraga RN  Outcome: Progressing  7/8/2025 1545 by Adri Lizarraga RN  Outcome: Progressing  Goal: Absence of Hospital-Acquired Illness or Injury  7/8/2025 1619 by Adri Lizarraga RN  Outcome: Progressing  7/8/2025 1545 by Adri Lizarraga RN  Outcome: Progressing  Goal: Optimal Comfort and Wellbeing  7/8/2025 1619 by Adri Lizarraga RN  Outcome: Progressing  7/8/2025 1545 by Adri Lizarraga RN  Outcome: Progressing  Goal: Readiness for Transition of Care  7/8/2025 1619 by Adri Lizarraga RN  Outcome: Progressing  7/8/2025 1545 by Adri Lizarraga RN  Outcome: Progressing     Problem: Diabetes Comorbidity  Goal: Blood Glucose Level Within Targeted Range  7/8/2025 1619 by Adri Lizarraga RN  Outcome: Progressing  7/8/2025 1545 by Adri Lizarraga RN  Outcome: Progressing     Problem: Sepsis/Septic Shock  Goal: Optimal Coping  7/8/2025 1619 by Adri Lizarraga RN  Outcome: Progressing  7/8/2025 1545 by Adri Lizarraga, RN  Outcome: Progressing  Goal: Absence of  Bleeding  7/8/2025 1619 by Adir Lizarraga RN  Outcome: Progressing  7/8/2025 1545 by Adri Lizarraga RN  Outcome: Progressing  Goal: Blood Glucose Level Within Targeted Range  7/8/2025 1619 by Adri Lizarraga RN  Outcome: Progressing  7/8/2025 1545 by Adri Lizarraga RN  Outcome: Progressing  Goal: Absence of Infection Signs and Symptoms  7/8/2025 1619 by Adri Lizarraga RN  Outcome: Progressing  7/8/2025 1545 by Adri Lizarraga RN  Outcome: Progressing  Goal: Optimal Nutrition Intake  7/8/2025 1619 by Adri Lizarraga RN  Outcome: Progressing  7/8/2025 1545 by Adri Lizarraga RN  Outcome: Progressing     Problem: Infection  Goal: Absence of Infection Signs and Symptoms  7/8/2025 1619 by Adri Lizarraga RN  Outcome: Progressing  7/8/2025 1545 by Adri Lizarraga RN  Outcome: Progressing     Problem: Fall Injury Risk  Goal: Absence of Fall and Fall-Related Injury  7/8/2025 1619 by Adri Lizarraga RN  Outcome: Progressing  7/8/2025 1545 by Adri Lizarraga RN  Outcome: Progressing     Problem: Wound  Goal: Optimal Coping  7/8/2025 1619 by Adri Lizarraga RN  Outcome: Progressing  7/8/2025 1545 by Adri Lizarraga RN  Outcome: Progressing  Goal: Optimal Functional Ability  7/8/2025 1619 by Adri Lizarraga RN  Outcome: Progressing  7/8/2025 1545 by Adri Lizarraga RN  Outcome: Progressing  Goal: Absence of Infection Signs and Symptoms  7/8/2025 1619 by Adri Lizarraga RN  Outcome: Progressing  7/8/2025 1545 by Adri Lizarraga, RN  Outcome: Progressing  Goal: Improved Oral Intake  7/8/2025 1619 by Adri Lizarraga RN  Outcome: Progressing  7/8/2025 1545 by Adri Lizarraga RN  Outcome: Progressing  Goal: Optimal Pain Control and Function  7/8/2025 1619 by Adri Lizarraga, RN  Outcome: Progressing  7/8/2025 1545 by Adri Lizarraga RN  Outcome: Progressing  Goal: Skin Health and Integrity  7/8/2025 1619 by Adri Lizarraga, RN  Outcome: Progressing  7/8/2025 1545 by Adri Lizarraga, RN  Outcome:  Progressing  Goal: Optimal Wound Healing  7/8/2025 1619 by Adri Lizarraga, RN  Outcome: Progressing  7/8/2025 1545 by Adri Lizarraga, RN  Outcome: Progressing

## 2025-07-08 NOTE — ASSESSMENT & PLAN NOTE
"I have reviewed hospital notes from HM service and other specialty providers. I have also reviewed CBC, CMP/BMP, cultures and imaging with my interpretation as documented.     60 yo F with ESRD, previously on thrice-weekly HD, now transitioning to home PD and currently receiving once weekly HD, last session 1 week ago, presenting to ED with dysuria, weakness, HA, N/V. K 7.0.    U/a with >100 WBC, moderate bacteria, 3+ leukocytes  Urine culture + Klebsiella pneumoniae ESBL    ID consulted for "ESBL UTI"    Recommendations / Plan  D/c Ceftriaxone and vancomycin   Start Ertapenem (renal dose) x 5 days (EOC:7/12/25)    -- Discussed with ID staff      "

## 2025-07-09 LAB
ALBUMIN SERPL BCP-MCNC: 2 G/DL (ref 3.5–5.2)
ANION GAP (OHS): 7 MMOL/L (ref 8–16)
BUN SERPL-MCNC: 21 MG/DL (ref 6–20)
CALCIUM SERPL-MCNC: 7.7 MG/DL (ref 8.7–10.5)
CHLORIDE SERPL-SCNC: 100 MMOL/L (ref 95–110)
CO2 SERPL-SCNC: 28 MMOL/L (ref 23–29)
CREAT SERPL-MCNC: 4.7 MG/DL (ref 0.5–1.4)
ERYTHROCYTE [DISTWIDTH] IN BLOOD BY AUTOMATED COUNT: 17.4 % (ref 11.5–14.5)
GFR SERPLBLD CREATININE-BSD FMLA CKD-EPI: 10 ML/MIN/1.73/M2
GLUCOSE SERPL-MCNC: 122 MG/DL (ref 70–110)
HCT VFR BLD AUTO: 32.3 % (ref 37–48.5)
HGB BLD-MCNC: 10 GM/DL (ref 12–16)
MCH RBC QN AUTO: 30.5 PG (ref 27–31)
MCHC RBC AUTO-ENTMCNC: 31 G/DL (ref 32–36)
MCV RBC AUTO: 99 FL (ref 82–98)
PHOSPHATE SERPL-MCNC: 3.7 MG/DL (ref 2.7–4.5)
PLATELET # BLD AUTO: 325 K/UL (ref 150–450)
PMV BLD AUTO: 9.9 FL (ref 9.2–12.9)
POCT GLUCOSE: 198 MG/DL (ref 70–110)
POTASSIUM SERPL-SCNC: 3.7 MMOL/L (ref 3.5–5.1)
RBC # BLD AUTO: 3.28 M/UL (ref 4–5.4)
SODIUM SERPL-SCNC: 135 MMOL/L (ref 136–145)
VANCOMYCIN SERPL-MCNC: 35.7 UG/ML (ref ?–80)
WBC # BLD AUTO: 5.47 K/UL (ref 3.9–12.7)

## 2025-07-09 PROCEDURE — 63700000 PHARM REV CODE 250 ALT 637 W/O HCPCS: Mod: HCNC | Performed by: INTERNAL MEDICINE

## 2025-07-09 PROCEDURE — 99233 SBSQ HOSP IP/OBS HIGH 50: CPT | Mod: HCNC,GC,, | Performed by: STUDENT IN AN ORGANIZED HEALTH CARE EDUCATION/TRAINING PROGRAM

## 2025-07-09 PROCEDURE — 25000003 PHARM REV CODE 250: Mod: HCNC | Performed by: INTERNAL MEDICINE

## 2025-07-09 PROCEDURE — 63600175 PHARM REV CODE 636 W HCPCS: Mod: HCNC | Performed by: INTERNAL MEDICINE

## 2025-07-09 PROCEDURE — 80100014 HC HEMODIALYSIS 1:1: Mod: HCNC

## 2025-07-09 PROCEDURE — 20600001 HC STEP DOWN PRIVATE ROOM: Mod: HCNC

## 2025-07-09 PROCEDURE — 85027 COMPLETE CBC AUTOMATED: CPT | Mod: HCNC | Performed by: INTERNAL MEDICINE

## 2025-07-09 PROCEDURE — 25000003 PHARM REV CODE 250: Mod: HCNC | Performed by: NURSE PRACTITIONER

## 2025-07-09 PROCEDURE — 27000207 HC ISOLATION: Mod: HCNC

## 2025-07-09 PROCEDURE — 80069 RENAL FUNCTION PANEL: CPT | Mod: HCNC | Performed by: INTERNAL MEDICINE

## 2025-07-09 PROCEDURE — 90945 DIALYSIS ONE EVALUATION: CPT | Mod: HCNC

## 2025-07-09 PROCEDURE — 80202 ASSAY OF VANCOMYCIN: CPT | Mod: HCNC | Performed by: INTERNAL MEDICINE

## 2025-07-09 PROCEDURE — 63600175 PHARM REV CODE 636 W HCPCS: Mod: HCNC

## 2025-07-09 PROCEDURE — 25000003 PHARM REV CODE 250: Mod: HCNC

## 2025-07-09 RX ORDER — MUPIROCIN 20 MG/G
OINTMENT TOPICAL 2 TIMES DAILY
Status: DISCONTINUED | OUTPATIENT
Start: 2025-07-09 | End: 2025-07-12 | Stop reason: HOSPADM

## 2025-07-09 RX ORDER — HEPARIN SODIUM 1000 [USP'U]/ML
1000 INJECTION, SOLUTION INTRAVENOUS; SUBCUTANEOUS
Status: ACTIVE | OUTPATIENT
Start: 2025-07-10 | End: 2025-07-10

## 2025-07-09 RX ORDER — SODIUM CHLORIDE 9 MG/ML
INJECTION, SOLUTION INTRAVENOUS ONCE
Status: DISCONTINUED | OUTPATIENT
Start: 2025-07-10 | End: 2025-07-12 | Stop reason: HOSPADM

## 2025-07-09 RX ADMIN — ERYTHROMYCIN: 5 OINTMENT OPHTHALMIC at 05:07

## 2025-07-09 RX ADMIN — ERTAPENEM 500 MG: 1 INJECTION INTRAMUSCULAR; INTRAVENOUS at 12:07

## 2025-07-09 RX ADMIN — CARVEDILOL 6.25 MG: 6.25 TABLET, FILM COATED ORAL at 08:07

## 2025-07-09 RX ADMIN — HEPARIN SODIUM 1000 UNITS: 1000 INJECTION INTRAVENOUS; SUBCUTANEOUS at 11:07

## 2025-07-09 RX ADMIN — TRAZODONE HYDROCHLORIDE 25 MG: 50 TABLET ORAL at 10:07

## 2025-07-09 RX ADMIN — MUPIROCIN: 20 OINTMENT TOPICAL at 08:07

## 2025-07-09 RX ADMIN — FUROSEMIDE 40 MG: 40 TABLET ORAL at 08:07

## 2025-07-09 RX ADMIN — SEVELAMER CARBONATE 2.4 G: 2400 POWDER, FOR SUSPENSION ORAL at 12:07

## 2025-07-09 RX ADMIN — OXYCODONE 5 MG: 5 TABLET ORAL at 10:07

## 2025-07-09 RX ADMIN — CINACALCET HYDROCHLORIDE 30 MG: 30 TABLET, FILM COATED ORAL at 05:07

## 2025-07-09 RX ADMIN — SEVELAMER CARBONATE 2.4 G: 2400 POWDER, FOR SUSPENSION ORAL at 05:07

## 2025-07-09 RX ADMIN — SODIUM BICARBONATE 650 MG TABLET 650 MG: at 08:07

## 2025-07-09 RX ADMIN — ERYTHROMYCIN: 5 OINTMENT OPHTHALMIC at 12:07

## 2025-07-09 RX ADMIN — CARVEDILOL 6.25 MG: 6.25 TABLET, FILM COATED ORAL at 12:07

## 2025-07-09 RX ADMIN — ISOSORBIDE DINITRATE 5 MG: 5 TABLET ORAL at 12:07

## 2025-07-09 RX ADMIN — APIXABAN 2.5 MG: 2.5 TABLET, FILM COATED ORAL at 12:07

## 2025-07-09 RX ADMIN — APIXABAN 2.5 MG: 2.5 TABLET, FILM COATED ORAL at 08:07

## 2025-07-09 RX ADMIN — TRAZODONE HYDROCHLORIDE 25 MG: 50 TABLET ORAL at 12:07

## 2025-07-09 RX ADMIN — SACUBITRIL AND VALSARTAN 1 TABLET: 24; 26 TABLET, FILM COATED ORAL at 08:07

## 2025-07-09 RX ADMIN — SACUBITRIL AND VALSARTAN 1 TABLET: 24; 26 TABLET, FILM COATED ORAL at 12:07

## 2025-07-09 RX ADMIN — ATORVASTATIN CALCIUM 80 MG: 40 TABLET, FILM COATED ORAL at 12:07

## 2025-07-09 RX ADMIN — ERYTHROMYCIN: 5 OINTMENT OPHTHALMIC at 10:07

## 2025-07-09 RX ADMIN — HYDRALAZINE HYDROCHLORIDE 10 MG: 10 TABLET, FILM COATED ORAL at 08:07

## 2025-07-09 RX ADMIN — ISOSORBIDE DINITRATE 5 MG: 5 TABLET ORAL at 08:07

## 2025-07-09 RX ADMIN — HYDRALAZINE HYDROCHLORIDE 10 MG: 10 TABLET, FILM COATED ORAL at 12:07

## 2025-07-09 RX ADMIN — FUROSEMIDE 40 MG: 40 TABLET ORAL at 12:07

## 2025-07-09 RX ADMIN — SODIUM BICARBONATE 650 MG TABLET 650 MG: at 12:07

## 2025-07-09 RX ADMIN — ALLOPURINOL 100 MG: 100 TABLET ORAL at 12:07

## 2025-07-09 RX ADMIN — MUPIROCIN: 20 OINTMENT TOPICAL at 12:07

## 2025-07-09 RX ADMIN — OXYCODONE 5 MG: 5 TABLET ORAL at 12:07

## 2025-07-09 RX ADMIN — CLOPIDOGREL BISULFATE 75 MG: 75 TABLET, FILM COATED ORAL at 12:07

## 2025-07-09 RX ADMIN — FLUCONAZOLE 100 MG: 100 TABLET ORAL at 12:07

## 2025-07-09 NOTE — PROGRESS NOTES
Arrived to the [facility] by stretcher. Maintenance dialysis started via left IJ tunneled catheter. Vitals stable. Isolation precautions maintained.

## 2025-07-09 NOTE — ASSESSMENT & PLAN NOTE
Improving. Infectious vs uremic etiology    -Peritoneal fluid culture and gram stain pending to evaluate for peritonitis, given WBC > 100 on fluid studies, concern for possible peritonitis; pt on vancomycin/ceftazidine with PD during admission  - Repeat peritoneal fluid analysis with improvement in cell count   - On IV ertapenem for Klebsiella ESBL UTI  -Continue to monitor symptoms

## 2025-07-09 NOTE — ASSESSMENT & PLAN NOTE
Nephrology History  -Outpatient HD unit: [facility]  -Nephrologist:   -HD tx days: Previously MWF; now once weekly only as transitioning to PD  -Last HD tx: 6/27 prior to hospitalization  -HD access: tunneled IJ cath  -HD modality: iHD/PD  -Residual urine: +  -EDW: 67kg?    - HD today per MWF schedule, UF 3L as tolerated  - Reassess need for continued iHD   - Strict I&O, pre and post dialysis weights  - Renal diet encouraged

## 2025-07-09 NOTE — ASSESSMENT & PLAN NOTE
>>ASSESSMENT AND PLAN FOR S/P CABG X 1 WRITTEN ON 7/9/2025 10:04 AM BY SUHAS ALVAREZ MD    stable

## 2025-07-09 NOTE — ASSESSMENT & PLAN NOTE
Current hemoglobin 10. Continue to trend    -Defer EPO administration at this time but will continue to assess need with HD sessions

## 2025-07-09 NOTE — CARE UPDATE
[facility] Care Support Interaction      I have reviewed the chart of Suyapa Connelly who is hospitalized for Hyperkalemia. The patient is currently located in the following unit: [facility]        I have assisted the primary physician in management of the following:      MRSA Decolonization - Mupirocin ordered and CHG ordered    ROJELIO Quintero-BC  Unit [facility]

## 2025-07-09 NOTE — PROGRESS NOTES
[facility]  Nephrology  Progress Note    Patient Name: Suyapa Connelly  MRN: [id number]  Admission Date: 7/3/2025  Hospital Length of Stay: 4 days  Attending Provider: Randell Garcia MD   Primary Care Physician: Vansesa Noel MD  Principal Problem:Hyperkalemia    Subjective:     HPI: Patient is a 59-year-old female with past medical history as stated below significant for ESRD secondary to hypertensive disease previously on HD MWF schedule via tunneled IJ cath currently transitioning to home PD, type II diabetes mellitus, hypertension, hyperlipidemia, severe PAD s/p bilateral AKA c/b poor healing, L common iliac stent, CAD s/p CABG, HFrEF, obesity, pAF, depression, CAROLIN, mod-sev TR who presented to ED for lethargy, nausea/vomiting, dysuria and abnormal urine appearance. She is accompanied by her  who provides the majority of the history as patient appears to be somnolent during exam.   endorses that patient has been having nausea/vomiting and lethargy for the past few days but the dysuria started today. They are currently training for peritoneal dialysis at home. She had the peritoneal dialysis catheter placed 2-3 weeks ago and has been training over the past week. Her last full hemodialysis session was approximately one week ago. She went to her ophthalmologist today with eye pain and blurry vision; she was diagnosed with blepheritis. However, was recommended to present to ED for further evaluation of her lethargy and urinary symptoms.  ER Course: Afebrile and otherwise hemodynamically stable on room air. CBC with normal wbc, stable anemia with hemoglobin 11, normal platelets. CMP notable for hyponatremia 129, hyperkalemia 7.0, metabolic acidosis with bicarb 11, BUN/sCr 113/9.8.  EKG personally reviewed with T wave changes most significant in leads V2 and V3. Nephrology consulted for hyperkalemia and ESRD management.     Interval History: Patient evaluated during  dialysis this morning. She is tolerating well. Denies any acute concerns at this time.     Review of patient's allergies indicates:   Allergen Reactions    Ciprofloxacin Itching    Pcn [penicillins]      Rash; tolerated ceftriaxone on 1/13/20  Tolerating Augmentin November 2024     Current Facility-Administered Medications   Medication Frequency    0.9% NaCl infusion PRN    0.9% NaCl infusion Once    [START ON 7/10/2025] 0.9% NaCl infusion Once    acetaminophen tablet 650 mg Q8H PRN    allopurinoL tablet 100 mg Daily    apixaban tablet 2.5 mg BID    atorvastatin tablet 80 mg Daily    carvediloL tablet 6.25 mg BID    cinacalcet tablet 30 mg Every Mon, Wed, Fri    clopidogreL tablet 75 mg Daily    dextrose 50% injection 12.5 g PRN    dextrose 50% injection 25 g PRN    ertapenem (INVANZ) 500 mg in 0.9% NaCl 100 mL IVPB Q24H    erythromycin 5 mg/gram (0.5 %) ophthalmic ointment Q6H    famotidine tablet 20 mg Q48H    fluconazole tablet 100 mg Daily    furosemide tablet 40 mg BID    glucagon (human recombinant) injection 1 mg PRN    glucose chewable tablet 16 g PRN    glucose chewable tablet 24 g PRN    [START ON 7/10/2025] heparin (porcine) injection 1,000 Units PRN    hydrALAZINE tablet 10 mg Q12H    HYDROmorphone injection 0.5 mg Q6H PRN    insulin aspart U-100 pen 0-5 Units QID (AC + HS) PRN    isosorbide dinitrate tablet 5 mg BID    mupirocin 2 % ointment BID    oxyCODONE immediate release tablet 5 mg Q6H PRN    promethazine 12.5 mg in 0.9% NaCl 50 mL IVPB Q6H PRN    sacubitriL-valsartan 24-26 mg per tablet 1 tablet BID    sertraline tablet 100 mg Daily    sevelamer carbonate 2.4 gram powder 2.4 g TID WM    sodium bicarbonate tablet 650 mg BID    sodium chloride 0.9% bolus 250 mL 250 mL PRN    sodium chloride 0.9% flush 10 mL PRN    trazodone split tablet 25 mg Nightly PRN       Objective:     Vital Signs (Most Recent):  Temp: 98 °F (36.7 °C) (07/09/25 1215)  Pulse: 79 (07/09/25 1215)  Resp: 16 (07/09/25 1238)  BP:  (!) 140/80 (07/09/25 1218)  SpO2: 97 % (07/09/25 1215) Vital Signs (24h Range):  Temp:  [97.4 °F (36.3 °C)-98.2 °F (36.8 °C)] 98 °F (36.7 °C)  Pulse:  [76-88] 79  Resp:  [15-20] 16  SpO2:  [92 %-99 %] 97 %  BP: (114-167)/(55-83) 140/80     Weight: 64 kg (141 lb 1.5 oz) (07/04/25 1204)  Body mass index is 23.48 kg/m².  Body surface area is 1.71 meters squared.    I/O last 3 completed shifts:  In: 240 [P.O.:240]  Out: 250 [Urine:50; Emesis/NG output:200]     Physical Exam  Vitals and nursing note reviewed.   Constitutional:       Appearance: Normal appearance. She is ill-appearing (chronically).   Eyes:      General: No scleral icterus.     Extraocular Movements: Extraocular movements intact.   Cardiovascular:      Rate and Rhythm: Normal rate and regular rhythm.      Pulses: Normal pulses.   Pulmonary:      Effort: Pulmonary effort is normal. No respiratory distress.   Musculoskeletal:         General: Deformity: bilateral AKA. Normal range of motion.   Skin:     General: Skin is warm and dry.   Neurological:      General: No focal deficit present.      Mental Status: She is alert. Mental status is at baseline.   Psychiatric:         Mood and Affect: Mood normal.          Significant Labs:  All labs within the past 24 hours have been reviewed.     Significant Imaging:  Labs: Reviewed  Assessment/Plan:     Neuro  Encephalopathy  Improving. Infectious vs uremic etiology    -Peritoneal fluid culture and gram stain pending to evaluate for peritonitis, given WBC > 100 on fluid studies, concern for possible peritonitis; pt on vancomycin/ceftazidine with PD during admission  - Repeat peritoneal fluid analysis with improvement in cell count   - On IV ertapenem for Klebsiella ESBL UTI  -Continue to monitor symptoms    Renal/  ESRD (end stage renal disease)  Nephrology History  -[facility] HD [facility]  -Nephrologist:   -HD tx days: Previously MWF; now once weekly only as transitioning to PD  -Last HD tx: 6/27  prior to hospitalization  -HD access: tunneled IJ cath  -HD modality: iHD/PD  -Residual urine: +  -EDW: 67kg?    - HD today per MWF schedule, UF 3L as tolerated  - Reassess need for continued iHD   - Strict I&O, pre and post dialysis weights  - Renal diet encouraged        Urinary tract infection due to ESBL Klebsiella  On IV ertapenem   - Management per primary team    Oncology  Anemia due to chronic kidney disease, on chronic dialysis  Current hemoglobin 10. Continue to trend    -Defer EPO administration at this time but will continue to assess need with HD sessions      Patient is at high risk for mortality given encephalopathy in setting of Klebsiella ESBL UTI with possible peritonitis, ESRD on HD, anemia, bilateral AKA, and other chronic medical conditions.     Thank you for your consult. I will follow-up with patient. Please contact us if you have any additional questions.    Hayley Paniagua MD  Nephrology PGY-4  [facility]

## 2025-07-09 NOTE — ASSESSMENT & PLAN NOTE
Creatine stable for now. BMP reviewed- noted Estimated Creatinine Clearance: 11.7 mL/min (A) (based on SCr of 4.7 mg/dL (H)). according to latest data. Based on current GFR, CKD stage is end stage.  Monitor UOP and serial BMP and adjust therapy as needed. Renally dose meds. Avoid nephrotoxic medications and procedures.

## 2025-07-09 NOTE — PROGRESS NOTES
[facility] Medicine  Progress Note    Patient Name: Suyapa Connelly  MRN: [id number]  Patient Class: IP- Inpatient   Admission Date: 7/3/2025  Length of Stay: 4 days  Attending Physician: Randell Garcia MD  Primary Care Provider: Vanessa Noel MD        Subjective     Principal Problem:Hyperkalemia        HPI:  58 yo F with ESRD, previously on thrice-weekly HD, now transitioning to home PD and currently receiving once weekly HD, last session 1 week ago, presenting to ED with dysuria, weakness, HA, N/V. K 7.0. EKG ordered, pending. Calcium, insulin, D50, albuterol, Lokelma ordered for medical management.      reports she is more somnolent, CT of head is pending.    Overview/Hospital Course:  No notes on file    Interval History: Will start Ertapenem (renal dose) x 5 days (EOC:7/12/25). Otherwise doing well.    Review of Systems  Objective:     Vital Signs (Most Recent):  Temp: 98 °F (36.7 °C) (07/09/25 0740)  Pulse: 78 (07/09/25 1000)  Resp: 18 (07/09/25 0740)  BP: (!) 126/59 (07/09/25 0945)  SpO2: 97 % (07/09/25 0740) Vital Signs (24h Range):  Temp:  [97.4 °F (36.3 °C)-98.4 °F (36.9 °C)] 98 °F (36.7 °C)  Pulse:  [78-88] 78  Resp:  [13-20] 18  SpO2:  [91 %-100 %] 97 %  BP: (114-167)/(55-74) 126/59     Weight: 64 kg (141 lb 1.5 oz)  Body mass index is 23.48 kg/m².  No intake or output data in the 24 hours ending 07/09/25 1002      Physical Exam  Constitutional:       General: She is in acute distress.      Appearance: She is ill-appearing.   HENT:      Head: Normocephalic.      Nose: Nose normal.      Mouth/Throat:      Mouth: Mucous membranes are moist.   Cardiovascular:      Rate and Rhythm: Normal rate.   Pulmonary:      Effort: Pulmonary effort is normal.   Abdominal:      General: Abdomen is flat.   Musculoskeletal:         General: Normal range of motion.      Cervical back: Normal range of motion.   Skin:     General: Skin is warm.   Neurological:      General:  No focal deficit present.               Significant Labs: All pertinent labs within the past 24 hours have been reviewed.  BMP:   Recent Labs   Lab 07/09/25  0755   *   *   K 3.7      CO2 28   BUN 21*   CREATININE 4.7*   CALCIUM 7.7*       Significant Imaging: I have reviewed all pertinent imaging results/findings within the past 24 hours.      Assessment & Plan  Hyperkalemia  Hyperkalemia is likely due to ESRD.The patients most recent potassium results are listed below.  Recent Labs     07/07/25  0952 07/09/25  0755   K 3.8 3.7     Plan  - Monitor for arrhythmias with EKG and/or continuous telemetry.   - Treat the hyperkalemia with Potassium Binders.   - Monitor potassium: Every 12 hours  - The patient's hyperkalemia is stable  Will get                             urgent HD in ER  Nephrology consulted          S/P CABG x 1  stable    Anemia due to chronic kidney disease, on chronic dialysis  Anemia is likely due to chronic disease due to ESRD. Most recent hemoglobin and hematocrit are listed below.  Recent Labs     07/09/25  0755   HGB 10.0*   HCT 32.3*       Plan  - Monitor serial CBC: Daily  - Transfuse PRBC if patient becomes hemodynamically unstable, symptomatic or H/H drops below 7/21.  - Patient has not received any PRBC transfusions to date  - Patient's anemia is currently stable    Paroxysmal atrial fibrillation  Patient has persistent (7 days or more) atrial fibrillation. Patient is currently in sinus rhythm. [id number] Score: 3. The patients heart rate in the last 24 hours is as follows:  Pulse  Min: 78  Max: 88     Antiarrhythmics       Anticoagulants  apixaban tablet 2.5 mg, 2 times daily, Oral  heparin (porcine) injection 1,000 Units, As needed (PRN), Intra-Catheter    Plan  - Replete lytes with a goal of K>4, Mg >2  - Patient is anticoagulated, see medications listed above.  - Patient's afib is currently controlled      Encephalopathy  Uncertain etiology  Will check UA, PD fluid  CT  of head pending    ESRD (end stage renal disease)  Creatine stable for now. BMP reviewed- noted Estimated Creatinine Clearance: 11.7 mL/min (A) (based on SCr of 4.7 mg/dL (H)). according to latest data. Based on current GFR, CKD stage is end stage.  Monitor UOP and serial BMP and adjust therapy as needed. Renally dose meds. Avoid nephrotoxic medications and procedures.  Moderate malnutrition  Nutrition consulted. Most recent weight and BMI monitored-     Measurements:  Wt Readings from Last 1 Encounters:   07/04/25 64 kg (141 lb 1.5 oz)   Body mass index is 23.48 kg/m².    Patient has been screened and assessed by RD.    Malnutrition Type:  Context: chronic illness  Level: moderate    Malnutrition Characteristic Summary:  Weight Loss (Malnutrition): 20% in 1 year  Energy Intake (Malnutrition): less than 75% for greater than 7 days  Subcutaneous Fat (Malnutrition): mild depletion  Muscle Mass (Malnutrition): mild depletion  Fluid Accumulation (Malnutrition): moderate    Interventions/Recommendations (treatment strategy):  1.)    Urinary tract infection due to ESBL Klebsiella      VTE Risk Mitigation (From admission, onward)           Ordered     heparin (porcine) injection 1,000 Units  As needed (PRN)         07/09/25 0933     heparin (porcine) injection 1,000 Units  As needed (PRN)         07/07/25 1002     apixaban tablet 2.5 mg  2 times daily         07/03/25 1625     IP VTE HIGH RISK PATIENT  Once         07/03/25 1620     Place sequential compression device  Until discontinued         07/03/25 1620     heparin (porcine) injection 1,000 Units  As needed (PRN)         07/03/25 1500                    Discharge Planning   DEVAN: 7/9/2025     Code Status: Full Code   Medical Readiness for Discharge Date:   Discharge Plan A: Home with family                        Randell Garcia MD  Department of Hospital Medicine   [facility]

## 2025-07-09 NOTE — SUBJECTIVE & OBJECTIVE
Doing well today.  +FM  No ctx/cramping/vb/spotting  No bowel/bladder issues  Good appetite and energy  Leaving for [place] next week  Anatomy us today wnl, variable lie, EFW 391g, ant placenta, mild R pylectasis (5mm) --will repeat imaging at 28wks  RTC 4wks   Interval History: Patient evaluated during dialysis this morning. She is tolerating well. Denies any acute concerns at this time.     Review of patient's allergies indicates:   Allergen Reactions    Ciprofloxacin Itching    Pcn [penicillins]      Rash; tolerated ceftriaxone on 1/13/20  Tolerating Augmentin November 2024     Current Facility-Administered Medications   Medication Frequency    0.9% NaCl infusion PRN    0.9% NaCl infusion Once    [START ON 7/10/2025] 0.9% NaCl infusion Once    acetaminophen tablet 650 mg Q8H PRN    allopurinoL tablet 100 mg Daily    apixaban tablet 2.5 mg BID    atorvastatin tablet 80 mg Daily    carvediloL tablet 6.25 mg BID    cinacalcet tablet 30 mg Every Mon, Wed, Fri    clopidogreL tablet 75 mg Daily    dextrose 50% injection 12.5 g PRN    dextrose 50% injection 25 g PRN    ertapenem (INVANZ) 500 mg in 0.9% NaCl 100 mL IVPB Q24H    erythromycin 5 mg/gram (0.5 %) ophthalmic ointment Q6H    famotidine tablet 20 mg Q48H    fluconazole tablet 100 mg Daily    furosemide tablet 40 mg BID    glucagon (human recombinant) injection 1 mg PRN    glucose chewable tablet 16 g PRN    glucose chewable tablet 24 g PRN    [START ON 7/10/2025] heparin (porcine) injection 1,000 Units PRN    hydrALAZINE tablet 10 mg Q12H    HYDROmorphone injection 0.5 mg Q6H PRN    insulin aspart U-100 pen 0-5 Units QID (AC + HS) PRN    isosorbide dinitrate tablet 5 mg BID    mupirocin 2 % ointment BID    oxyCODONE immediate release tablet 5 mg Q6H PRN    promethazine 12.5 mg in 0.9% NaCl 50 mL IVPB Q6H PRN    sacubitriL-valsartan 24-26 mg per tablet 1 tablet BID    sertraline tablet 100 mg Daily    sevelamer carbonate 2.4 gram powder 2.4 g TID WM    sodium bicarbonate tablet 650 mg BID    sodium chloride 0.9% bolus 250 mL 250 mL PRN    sodium chloride 0.9% flush 10 mL PRN    trazodone split tablet 25 mg Nightly PRN       Objective:     Vital Signs (Most Recent):  Temp: 98 °F (36.7 °C) (07/09/25 1215)  Pulse: 79  (07/09/25 1215)  Resp: 16 (07/09/25 1238)  BP: (!) 140/80 (07/09/25 1218)  SpO2: 97 % (07/09/25 1215) Vital Signs (24h Range):  Temp:  [97.4 °F (36.3 °C)-98.2 °F (36.8 °C)] 98 °F (36.7 °C)  Pulse:  [76-88] 79  Resp:  [15-20] 16  SpO2:  [92 %-99 %] 97 %  BP: (114-167)/(55-83) 140/80     Weight: 64 kg (141 lb 1.5 oz) (07/04/25 1204)  Body mass index is 23.48 kg/m².  Body surface area is 1.71 meters squared.    I/O last 3 completed shifts:  In: 240 [P.O.:240]  Out: 250 [Urine:50; Emesis/NG output:200]     Physical Exam  Vitals and nursing note reviewed.   Constitutional:       Appearance: Normal appearance. She is ill-appearing (chronically).   Eyes:      General: No scleral icterus.     Extraocular Movements: Extraocular movements intact.   Cardiovascular:      Rate and Rhythm: Normal rate and regular rhythm.      Pulses: Normal pulses.   Pulmonary:      Effort: Pulmonary effort is normal. No respiratory distress.   Musculoskeletal:         General: Deformity: bilateral AKA. Normal range of motion.   Skin:     General: Skin is warm and dry.   Neurological:      General: No focal deficit present.      Mental Status: She is alert. Mental status is at baseline.   Psychiatric:         Mood and Affect: Mood normal.          Significant Labs:  All labs within the past 24 hours have been reviewed.     Significant Imaging:  Labs: Reviewed

## 2025-07-09 NOTE — ASSESSMENT & PLAN NOTE
Patient has persistent (7 days or more) atrial fibrillation. Patient is currently in sinus rhythm. [id number] Score: 3. The patients heart rate in the last 24 hours is as follows:  Pulse  Min: 78  Max: 88     Antiarrhythmics       Anticoagulants  apixaban tablet 2.5 mg, 2 times daily, Oral  heparin (porcine) injection 1,000 Units, As needed (PRN), Intra-Catheter    Plan  - Replete lytes with a goal of K>4, Mg >2  - Patient is anticoagulated, see medications listed above.  - Patient's afib is currently controlled

## 2025-07-09 NOTE — ASSESSMENT & PLAN NOTE
Etiology UTI  CTH: Sulcal crowding at the apex with vague sulcal hyperattenuation, suggestive of pseudosubarachnoid hemorrhage, favoring mild cerebral edema.     MRI: No evidence for acute infarction   Mental status back to baseline

## 2025-07-09 NOTE — PROGRESS NOTES
Peritoneal Dialysis: Manual exchange performed using aseptic technique.   2000 ml of Dianeal 2.5% solution instilled .  Patient drained 100 ml of clear, yellow effluent. PD catheter clamped and capped with sterile betadine cap and catheter secured to abdomen with tape.

## 2025-07-09 NOTE — PLAN OF CARE
Pt aaox3. >95% on RA with no distress noted. Tele- NSR. Received PD at bedside. VSS. No acute changes during this shift. Bed lowered and locked. Call light and personal items within reach.  remained at bedside for comfort. No known needs voiced at this time.        Problem: Skin Injury Risk Increased  Goal: Skin Health and Integrity  Outcome: Progressing     Problem: Hemodialysis  Goal: Safe, Effective Therapy Delivery  Outcome: Progressing  Goal: Effective Tissue Perfusion  Outcome: Progressing  Goal: Absence of Infection Signs and Symptoms  Outcome: Progressing     Problem: Adult Inpatient Plan of Care  Goal: Plan of Care Review  Outcome: Progressing  Goal: Patient-Specific Goal (Individualized)  Outcome: Progressing  Goal: Absence of Hospital-Acquired Illness or Injury  Outcome: Progressing  Goal: Optimal Comfort and Wellbeing  Outcome: Progressing  Goal: Readiness for Transition of Care  Outcome: Progressing     Problem: Diabetes Comorbidity  Goal: Blood Glucose Level Within Targeted Range  Outcome: Progressing     Problem: Sepsis/Septic Shock  Goal: Optimal Coping  Outcome: Progressing  Goal: Absence of Bleeding  Outcome: Progressing  Goal: Blood Glucose Level Within Targeted Range  Outcome: Progressing  Goal: Absence of Infection Signs and Symptoms  Outcome: Progressing  Goal: Optimal Nutrition Intake  Outcome: Progressing     Problem: Infection  Goal: Absence of Infection Signs and Symptoms  Outcome: Progressing     Problem: Fall Injury Risk  Goal: Absence of Fall and Fall-Related Injury  Outcome: Progressing     Problem: Wound  Goal: Optimal Coping  Outcome: Progressing  Goal: Optimal Functional Ability  Outcome: Progressing  Goal: Absence of Infection Signs and Symptoms  Outcome: Progressing  Goal: Improved Oral Intake  Outcome: Progressing  Goal: Optimal Pain Control and Function  Outcome: Progressing  Goal: Skin Health and Integrity  Outcome: Progressing  Goal: Optimal Wound Healing  Outcome:  Progressing

## 2025-07-09 NOTE — SUBJECTIVE & OBJECTIVE
Interval History: Will start Ertapenem (renal dose) x 5 days (EOC:7/12/25). Otherwise doing well.    Review of Systems  Objective:     Vital Signs (Most Recent):  Temp: 98 °F (36.7 °C) (07/09/25 0740)  Pulse: 78 (07/09/25 1000)  Resp: 18 (07/09/25 0740)  BP: (!) 126/59 (07/09/25 0945)  SpO2: 97 % (07/09/25 0740) Vital Signs (24h Range):  Temp:  [97.4 °F (36.3 °C)-98.4 °F (36.9 °C)] 98 °F (36.7 °C)  Pulse:  [78-88] 78  Resp:  [13-20] 18  SpO2:  [91 %-100 %] 97 %  BP: (114-167)/(55-74) 126/59     Weight: 64 kg (141 lb 1.5 oz)  Body mass index is 23.48 kg/m².  No intake or output data in the 24 hours ending 07/09/25 1002      Physical Exam  Constitutional:       General: She is in acute distress.      Appearance: She is ill-appearing.   HENT:      Head: Normocephalic.      Nose: Nose normal.      Mouth/Throat:      Mouth: Mucous membranes are moist.   Cardiovascular:      Rate and Rhythm: Normal rate.   Pulmonary:      Effort: Pulmonary effort is normal.   Abdominal:      General: Abdomen is flat.   Musculoskeletal:         General: Normal range of motion.      Cervical back: Normal range of motion.   Skin:     General: Skin is warm.   Neurological:      General: No focal deficit present.               Significant Labs: All pertinent labs within the past 24 hours have been reviewed.  BMP:   Recent Labs   Lab 07/09/25  0755   *   *   K 3.7      CO2 28   BUN 21*   CREATININE 4.7*   CALCIUM 7.7*       Significant Imaging: I have reviewed all pertinent imaging results/findings within the past 24 hours.

## 2025-07-09 NOTE — ASSESSMENT & PLAN NOTE
Anemia is likely due to chronic disease due to ESRD. Most recent hemoglobin and hematocrit are listed below.  Recent Labs     07/09/25  0755   HGB 10.0*   HCT 32.3*       Plan  - Monitor serial CBC: Daily  - Transfuse PRBC if patient becomes hemodynamically unstable, symptomatic or H/H drops below 7/21.  - Patient has not received any PRBC transfusions to date  - Patient's anemia is currently stable

## 2025-07-09 NOTE — PROGRESS NOTES
07/08/25 1612        Peritoneal Dialysis Catheter Mid lower abdomen   No placement date or time found.   Present Prior to Hospital Arrival?: Yes  Catheter Location: Mid lower abdomen   Site Assessment Clean;Dry;Intact   Dressing Intervention Sterile dressing change   Status Accessed   Dressing Status Clean;Dry;Intact   Dressing Gauze   Securement secured to abdomen with tape   Clamp Status clamped   Peritoneal Dialysis   Exchange Type Manual   Peritoneal Treatment Status Started   Dianeal Solution Dextrose 2.5% in 2000 mL     Report received from primary RN. CAPD started via PD catheter w/ antibiotics added

## 2025-07-09 NOTE — PROGRESS NOTES
Peritoneal Dialysis : Patient connected to drain bag using aseptic technique. Drained 1200 ml of clear, yellow effluent.  PD  transfer set capped with sterile betadine cap and secured to abdomen with paper tape. Sterile dressing change done to PD catheter exit site. Exit site clean, dry,intact with sterile gauze applied . Exit site without redness, drainage or swelling.

## 2025-07-09 NOTE — ASSESSMENT & PLAN NOTE
Hyperkalemia is likely due to ESRD.The patients most recent potassium results are listed below.  Recent Labs     07/07/25  0952 07/09/25  0755   K 3.8 3.7     Plan  - Monitor for arrhythmias with EKG and/or continuous telemetry.   - Treat the hyperkalemia with Potassium Binders.   - Monitor potassium: Every 12 hours  - The patient's hyperkalemia is stable  Will get                             urgent HD in ER  Nephrology consulted

## 2025-07-09 NOTE — PROGRESS NOTES
11:15 Dialysis completed. Left IJ tunneled catheter flushed, heparinized,capped and ends wrapped with sterile gauze.Dialyzed for 3.5 hours with fluid removal of 1.5 liters. Tolerated well with stable vitals signs. Returned to her room by stretcher.

## 2025-07-09 NOTE — PLAN OF CARE
Patient A&OX3, RA, Tele NSR. Patient q2 turns. Scaring on sacrum from previous pressure injuries. Left subclavian hemodialysis catheter dressing clean/dry/intact and dressing changed today. Peritoneal dialysis dressing clean/dry/intact and dressing changed today.  Problem: Skin Injury Risk Increased  Goal: Skin Health and Integrity  Outcome: Progressing     Problem: Hemodialysis  Goal: Safe, Effective Therapy Delivery  Outcome: Progressing     Problem: Hemodialysis  Goal: Effective Tissue Perfusion  Outcome: Progressing     Problem: Hemodialysis  Goal: Absence of Infection Signs and Symptoms  Outcome: Progressing     Problem: Diabetes Comorbidity  Goal: Blood Glucose Level Within Targeted Range  Outcome: Progressing     Problem: Infection  Goal: Absence of Infection Signs and Symptoms  Outcome: Progressing     Problem: Wound  Goal: Optimal Coping  Outcome: Progressing     Problem: Wound  Goal: Optimal Functional Ability  Outcome: Progressing

## 2025-07-10 LAB
POCT GLUCOSE: 129 MG/DL (ref 70–110)
POCT GLUCOSE: 129 MG/DL (ref 70–110)
POCT GLUCOSE: 139 MG/DL (ref 70–110)
POCT GLUCOSE: 149 MG/DL (ref 70–110)
POCT GLUCOSE: 156 MG/DL (ref 70–110)

## 2025-07-10 PROCEDURE — 63700000 PHARM REV CODE 250 ALT 637 W/O HCPCS: Mod: HCNC | Performed by: INTERNAL MEDICINE

## 2025-07-10 PROCEDURE — 63600175 PHARM REV CODE 636 W HCPCS: Mod: HCNC | Performed by: INTERNAL MEDICINE

## 2025-07-10 PROCEDURE — 90945 DIALYSIS ONE EVALUATION: CPT | Mod: HCNC

## 2025-07-10 PROCEDURE — 94761 N-INVAS EAR/PLS OXIMETRY MLT: CPT | Mod: HCNC

## 2025-07-10 PROCEDURE — 27000207 HC ISOLATION: Mod: HCNC

## 2025-07-10 PROCEDURE — 99233 SBSQ HOSP IP/OBS HIGH 50: CPT | Mod: HCNC,GC,, | Performed by: STUDENT IN AN ORGANIZED HEALTH CARE EDUCATION/TRAINING PROGRAM

## 2025-07-10 PROCEDURE — 25000003 PHARM REV CODE 250: Mod: HCNC | Performed by: INTERNAL MEDICINE

## 2025-07-10 PROCEDURE — 20600001 HC STEP DOWN PRIVATE ROOM: Mod: HCNC

## 2025-07-10 RX ORDER — SODIUM CHLORIDE 9 MG/ML
INJECTION, SOLUTION INTRAVENOUS
Status: CANCELLED | OUTPATIENT
Start: 2025-07-11

## 2025-07-10 RX ORDER — POLYETHYLENE GLYCOL 3350 17 G/17G
17 POWDER, FOR SOLUTION ORAL DAILY
Status: DISCONTINUED | OUTPATIENT
Start: 2025-07-10 | End: 2025-07-12 | Stop reason: HOSPADM

## 2025-07-10 RX ADMIN — ERYTHROMYCIN: 5 OINTMENT OPHTHALMIC at 11:07

## 2025-07-10 RX ADMIN — FUROSEMIDE 40 MG: 40 TABLET ORAL at 08:07

## 2025-07-10 RX ADMIN — SODIUM BICARBONATE 650 MG TABLET 650 MG: at 08:07

## 2025-07-10 RX ADMIN — SERTRALINE 100 MG: 100 TABLET, FILM COATED ORAL at 08:07

## 2025-07-10 RX ADMIN — FUROSEMIDE 40 MG: 40 TABLET ORAL at 09:07

## 2025-07-10 RX ADMIN — CARVEDILOL 6.25 MG: 6.25 TABLET, FILM COATED ORAL at 08:07

## 2025-07-10 RX ADMIN — CEFTAZIDIME: 1 INJECTION, POWDER, FOR SOLUTION INTRAMUSCULAR; INTRAVENOUS at 09:07

## 2025-07-10 RX ADMIN — ATORVASTATIN CALCIUM 80 MG: 40 TABLET, FILM COATED ORAL at 08:07

## 2025-07-10 RX ADMIN — APIXABAN 5 MG: 5 TABLET, FILM COATED ORAL at 09:07

## 2025-07-10 RX ADMIN — APIXABAN 2.5 MG: 2.5 TABLET, FILM COATED ORAL at 08:07

## 2025-07-10 RX ADMIN — SEVELAMER CARBONATE 2.4 G: 2400 POWDER, FOR SUSPENSION ORAL at 05:07

## 2025-07-10 RX ADMIN — SEVELAMER CARBONATE 2.4 G: 2400 POWDER, FOR SUSPENSION ORAL at 08:07

## 2025-07-10 RX ADMIN — CLOPIDOGREL BISULFATE 75 MG: 75 TABLET, FILM COATED ORAL at 08:07

## 2025-07-10 RX ADMIN — ERYTHROMYCIN: 5 OINTMENT OPHTHALMIC at 05:07

## 2025-07-10 RX ADMIN — MUPIROCIN: 20 OINTMENT TOPICAL at 08:07

## 2025-07-10 RX ADMIN — CARVEDILOL 6.25 MG: 6.25 TABLET, FILM COATED ORAL at 09:07

## 2025-07-10 RX ADMIN — FAMOTIDINE 20 MG: 20 TABLET, FILM COATED ORAL at 08:07

## 2025-07-10 RX ADMIN — SODIUM BICARBONATE 650 MG TABLET 650 MG: at 09:07

## 2025-07-10 RX ADMIN — MUPIROCIN: 20 OINTMENT TOPICAL at 09:07

## 2025-07-10 RX ADMIN — FLUCONAZOLE 100 MG: 100 TABLET ORAL at 08:07

## 2025-07-10 RX ADMIN — ALLOPURINOL 100 MG: 100 TABLET ORAL at 08:07

## 2025-07-10 RX ADMIN — SACUBITRIL AND VALSARTAN 1 TABLET: 24; 26 TABLET, FILM COATED ORAL at 08:07

## 2025-07-10 RX ADMIN — SEVELAMER CARBONATE 2.4 G: 2400 POWDER, FOR SUSPENSION ORAL at 11:07

## 2025-07-10 RX ADMIN — ERTAPENEM 500 MG: 1 INJECTION INTRAMUSCULAR; INTRAVENOUS at 11:07

## 2025-07-10 RX ADMIN — TRAZODONE HYDROCHLORIDE 25 MG: 50 TABLET ORAL at 09:07

## 2025-07-10 RX ADMIN — POLYETHYLENE GLYCOL 3350 17 G: 17 POWDER, FOR SOLUTION ORAL at 11:07

## 2025-07-10 NOTE — PROGRESS NOTES
07/10/25 0900        Peritoneal Dialysis Catheter Mid lower abdomen   No placement date or time found.   Present Prior to Hospital Arrival?: Yes  Catheter Location: Mid lower abdomen   Site Assessment Clean;Dry;Intact   Securement secured to abdomen with tape   Peritoneal Dialysis   Exchange Type Manual   Manual Peritoneal Dialysis   Manual Drain Volume (mL) 700 mL   Effluent Appearance Yellow   Manual Treatment Comments Drain post ABx dwell     PD fluid drained aseptically with only 700ml yellow effluent coming out.  Per spouse pt has been constipated for 4-5days. Will inform MD.  PD catheter capped aseptically and secured with tape.  Primary RN informed.

## 2025-07-10 NOTE — SUBJECTIVE & OBJECTIVE
Interval History: Patient evaluated at bedside this morning. She is resting comfortably in bed. No acute concerns at this time.     Review of patient's allergies indicates:   Allergen Reactions    Ciprofloxacin Itching    Pcn [penicillins]      Rash; tolerated ceftriaxone on 1/13/20  Tolerating Augmentin November 2024     Current Facility-Administered Medications   Medication Frequency    0.9% NaCl infusion PRN    0.9% NaCl infusion Once    0.9% NaCl infusion Once    acetaminophen tablet 650 mg Q8H PRN    allopurinoL tablet 100 mg Daily    apixaban tablet 5 mg BID    atorvastatin tablet 80 mg Daily    carvediloL tablet 6.25 mg BID    cinacalcet tablet 30 mg Every Mon, Wed, Fri    clopidogreL tablet 75 mg Daily    dextrose 50% injection 12.5 g PRN    dextrose 50% injection 25 g PRN    ertapenem (INVANZ) 500 mg in 0.9% NaCl 100 mL IVPB Q24H    erythromycin 5 mg/gram (0.5 %) ophthalmic ointment Q6H    famotidine tablet 20 mg Q48H    fluconazole tablet 100 mg Daily    furosemide tablet 40 mg BID    glucagon (human recombinant) injection 1 mg PRN    glucose chewable tablet 16 g PRN    glucose chewable tablet 24 g PRN    heparin (porcine) injection 1,000 Units PRN    hydrALAZINE tablet 10 mg Q12H    HYDROmorphone injection 0.5 mg Q6H PRN    insulin aspart U-100 pen 0-5 Units QID (AC + HS) PRN    isosorbide dinitrate tablet 5 mg BID    mupirocin 2 % ointment BID    oxyCODONE immediate release tablet 5 mg Q6H PRN    Peritoneal Dialysis Solution with Additives Once    Peritoneal Dialysis Solution with Additives Once    polyethylene glycol packet 17 g Daily    promethazine 12.5 mg in 0.9% NaCl 50 mL IVPB Q6H PRN    sacubitriL-valsartan 24-26 mg per tablet 1 tablet BID    sertraline tablet 100 mg Daily    sevelamer carbonate 2.4 gram powder 2.4 g TID WM    sodium bicarbonate tablet 650 mg BID    sodium chloride 0.9% bolus 250 mL 250 mL PRN    sodium chloride 0.9% flush 10 mL PRN    trazodone split tablet 25 mg Nightly PRN        Objective:     Vital Signs (Most Recent):  Temp: 98.6 °F (37 °C) (07/10/25 1112)  Pulse: 82 (07/10/25 1112)  Resp: 18 (07/10/25 1112)  BP: 97/60 (07/10/25 1112)  SpO2: 100 % (07/10/25 1112) Vital Signs (24h Range):  Temp:  [97.9 °F (36.6 °C)-98.9 °F (37.2 °C)] 98.6 °F (37 °C)  Pulse:  [81-84] 82  Resp:  [11-19] 18  SpO2:  [96 %-100 %] 100 %  BP: ()/(53-80) 97/60     Weight: 64 kg (141 lb 1.5 oz) (07/04/25 1204)  Body mass index is 23.48 kg/m².  Body surface area is 1.71 meters squared.    I/O last 3 completed shifts:  In: -   Out: 2250 [Urine:100; Other:2150]     Physical Exam  Vitals and nursing note reviewed.   Constitutional:       Appearance: Normal appearance. She is not ill-appearing (chronically).   Eyes:      General: No scleral icterus.     Extraocular Movements: Extraocular movements intact.   Cardiovascular:      Rate and Rhythm: Normal rate and regular rhythm.      Pulses: Normal pulses.   Pulmonary:      Effort: Pulmonary effort is normal. No respiratory distress.   Musculoskeletal:         General: Deformity: bilateral AKA. Normal range of motion.   Skin:     General: Skin is warm and dry.      Findings: Lesion: tunneled HD cath.   Neurological:      General: No focal deficit present.      Mental Status: She is alert. Mental status is at baseline.   Psychiatric:         Mood and Affect: Mood normal.          Significant Labs:  All labs within the past 24 hours have been reviewed.     Significant Imaging:  Labs: Reviewed

## 2025-07-10 NOTE — PROGRESS NOTES
[facility] - Acute [facility] Step[facility]  Nephrology  Progress Note    Patient Name: Suyapa Connelly  MRN: [id number]  Admission Date: 7/3/2025  Hospital Length of Stay: 5 days  Attending Provider: Timmy Hu MD   Primary Care Physician: Vanessa Noel MD  Principal Problem:Hyperkalemia    Subjective:     HPI: Patient is a 59-year-old female with past medical history as stated below significant for ESRD secondary to hypertensive disease previously on HD MWF schedule via tunneled IJ cath currently transitioning to home PD, type II diabetes mellitus, hypertension, hyperlipidemia, severe PAD s/p bilateral AKA c/b poor healing, L common iliac stent, CAD s/p CABG, HFrEF, obesity, pAF, depression, CAROLIN, mod-sev TR who presented to ED for lethargy, nausea/vomiting, dysuria and abnormal urine appearance. She is accompanied by her  who provides the majority of the history as patient appears to be somnolent during exam.   endorses that patient has been having nausea/vomiting and lethargy for the past few days but the dysuria started today. They are currently training for peritoneal dialysis at home. She had the peritoneal dialysis catheter placed 2-3 weeks ago and has been training over the past week. Her last full hemodialysis session was approximately one week ago. She went to her ophthalmologist today with eye pain and blurry vision; she was diagnosed with blepheritis. However, was recommended to present to ED for further evaluation of her lethargy and urinary symptoms.  ER Course: Afebrile and otherwise hemodynamically stable on room air. CBC with normal wbc, stable anemia with hemoglobin 11, normal platelets. CMP notable for hyponatremia 129, hyperkalemia 7.0, metabolic acidosis with bicarb 11, BUN/sCr 113/9.8.  EKG personally reviewed with T wave changes most significant in leads V2 and V3. Nephrology consulted for hyperkalemia and ESRD management.     Interval History: Patient evaluated at bedside  this morning. She is resting comfortably in bed. No acute concerns at this time.     Review of patient's allergies indicates:   Allergen Reactions    Ciprofloxacin Itching    Pcn [penicillins]      Rash; tolerated ceftriaxone on 1/13/20  Tolerating Augmentin November 2024     Current Facility-Administered Medications   Medication Frequency    0.9% NaCl infusion PRN    0.9% NaCl infusion Once    0.9% NaCl infusion Once    acetaminophen tablet 650 mg Q8H PRN    allopurinoL tablet 100 mg Daily    apixaban tablet 5 mg BID    atorvastatin tablet 80 mg Daily    carvediloL tablet 6.25 mg BID    cinacalcet tablet 30 mg Every Mon, Wed, Fri    clopidogreL tablet 75 mg Daily    dextrose 50% injection 12.5 g PRN    dextrose 50% injection 25 g PRN    ertapenem (INVANZ) 500 mg in 0.9% NaCl 100 mL IVPB Q24H    erythromycin 5 mg/gram (0.5 %) ophthalmic ointment Q6H    famotidine tablet 20 mg Q48H    fluconazole tablet 100 mg Daily    furosemide tablet 40 mg BID    glucagon (human recombinant) injection 1 mg PRN    glucose chewable tablet 16 g PRN    glucose chewable tablet 24 g PRN    heparin (porcine) injection 1,000 Units PRN    hydrALAZINE tablet 10 mg Q12H    HYDROmorphone injection 0.5 mg Q6H PRN    insulin aspart U-100 pen 0-5 Units QID (AC + HS) PRN    isosorbide dinitrate tablet 5 mg BID    mupirocin 2 % ointment BID    oxyCODONE immediate release tablet 5 mg Q6H PRN    Peritoneal Dialysis Solution with Additives Once    Peritoneal Dialysis Solution with Additives Once    polyethylene glycol packet 17 g Daily    promethazine 12.5 mg in 0.9% NaCl 50 mL IVPB Q6H PRN    sacubitriL-valsartan 24-26 mg per tablet 1 tablet BID    sertraline tablet 100 mg Daily    sevelamer carbonate 2.4 gram powder 2.4 g TID WM    sodium bicarbonate tablet 650 mg BID    sodium chloride 0.9% bolus 250 mL 250 mL PRN    sodium chloride 0.9% flush 10 mL PRN    trazodone split tablet 25 mg Nightly PRN       Objective:     Vital Signs (Most  Recent):  Temp: 98.6 °F (37 °C) (07/10/25 1112)  Pulse: 82 (07/10/25 1112)  Resp: 18 (07/10/25 1112)  BP: 97/60 (07/10/25 1112)  SpO2: 100 % (07/10/25 1112) Vital Signs (24h Range):  Temp:  [97.9 °F (36.6 °C)-98.9 °F (37.2 °C)] 98.6 °F (37 °C)  Pulse:  [81-84] 82  Resp:  [11-19] 18  SpO2:  [96 %-100 %] 100 %  BP: ()/(53-80) 97/60     Weight: 64 kg (141 lb 1.5 oz) (07/04/25 1204)  Body mass index is 23.48 kg/m².  Body surface area is 1.71 meters squared.    I/O last 3 completed shifts:  In: -   Out: 2250 [Urine:100; Other:2150]     Physical Exam  Vitals and nursing note reviewed.   Constitutional:       Appearance: Normal appearance. She is not ill-appearing (chronically).   Eyes:      General: No scleral icterus.     Extraocular Movements: Extraocular movements intact.   Cardiovascular:      Rate and Rhythm: Normal rate and regular rhythm.      Pulses: Normal pulses.   Pulmonary:      Effort: Pulmonary effort is normal. No respiratory distress.   Musculoskeletal:         General: Deformity: bilateral AKA. Normal range of motion.   Skin:     General: Skin is warm and dry.      Findings: Lesion: tunneled HD cath.   Neurological:      General: No focal deficit present.      Mental Status: She is alert. Mental status is at baseline.   Psychiatric:         Mood and Affect: Mood normal.          Significant Labs:  All labs within the past 24 hours have been reviewed.     Significant Imaging:  Labs: Reviewed    Assessment/Plan:     Neuro  Encephalopathy  Improving. Infectious vs uremic etiology    - Vancomycin/ceftazidine with PD during admission for possible peritonitis   - On IV ertapenem for Klebsiella ESBL UTI  -Continue to monitor symptoms    Renal/  ESRD (end stage renal disease)  Nephrology History  -Out[facility] HD [facility]  -Nephrologist:   -HD tx days: Previously MWF; now once weekly only as transitioning to PD  -Last HD tx: 6/27 prior to hospitalization  -HD access: tunneled IJ cath  -HD  modality: iHD/PD  -Residual urine: +  -EDW: 67kg?    - HD in AM per MWF schedule, UF 3L as tolerated  - Reassess need for continued iHD   - Strict I&O, pre and post dialysis weights  - Renal diet encouraged        Urinary tract infection due to ESBL Klebsiella  On IV ertapenem   - Management per primary and ID team    Other  Debility  Pt would benefit from physical therapy evaluation prior to discharge      Patient is at high risk for mortality due to Klebsiella ESBL UTI with possible peritonitis, ESRD on HD, anemia, bilateral AKA with ongoing physical debility and other chronic medical conditions.     Thank you for your consult. I will follow-up with patient. Please contact us if you have any additional questions.    Hayley Paniagua MD  Nephrology  [facility] - Acute Medical Stepdown

## 2025-07-10 NOTE — ASSESSMENT & PLAN NOTE
Nephrology History  -Outpatient HD unit: [facility]  -Nephrologist:   -HD tx days: Previously MWF; now once weekly only as transitioning to PD  -Last HD tx: 6/27 prior to hospitalization  -HD access: tunneled IJ cath  -HD modality: iHD/PD  -Residual urine: +  -EDW: 67kg?    - HD in AM per MWF schedule, UF 3L as tolerated  - Reassess need for continued iHD   - Strict I&O, pre and post dialysis weights  - Renal diet encouraged

## 2025-07-10 NOTE — ASSESSMENT & PLAN NOTE
Improving. Infectious vs uremic etiology    - Vancomycin/ceftazidine with PD during admission for possible peritonitis   - On IV ertapenem for Klebsiella ESBL UTI  -Continue to monitor symptoms

## 2025-07-10 NOTE — PLAN OF CARE
Pt AAOx 2. NAD. VSS stable on room air. PD done at bedside by HD RN, pt tolerated well. PT c/o of constipation, MD notified; Miralax given as ordered. Safety measures in place.  at bedside. No concerns voiced at present time. Call light in reach.    Problem: Skin Injury Risk Increased  Goal: Skin Health and Integrity  Outcome: Progressing     Problem: Hemodialysis  Goal: Safe, Effective Therapy Delivery  Outcome: Progressing  Goal: Effective Tissue Perfusion  Outcome: Progressing  Goal: Absence of Infection Signs and Symptoms  Outcome: Progressing     Problem: Adult Inpatient Plan of Care  Goal: Plan of Care Review  Outcome: Progressing  Goal: Patient-Specific Goal (Individualized)  Outcome: Progressing  Goal: Absence of Hospital-Acquired Illness or Injury  Outcome: Progressing  Goal: Optimal Comfort and Wellbeing  Outcome: Progressing  Goal: Readiness for Transition of Care  Outcome: Progressing     Problem: Diabetes Comorbidity  Goal: Blood Glucose Level Within Targeted Range  Outcome: Progressing     Problem: Sepsis/Septic Shock  Goal: Optimal Coping  Outcome: Progressing  Goal: Absence of Bleeding  Outcome: Progressing  Goal: Blood Glucose Level Within Targeted Range  Outcome: Progressing  Goal: Absence of Infection Signs and Symptoms  Outcome: Progressing  Goal: Optimal Nutrition Intake  Outcome: Progressing     Problem: Infection  Goal: Absence of Infection Signs and Symptoms  Outcome: Progressing     Problem: Fall Injury Risk  Goal: Absence of Fall and Fall-Related Injury  Outcome: Progressing     Problem: Wound  Goal: Optimal Coping  Outcome: Progressing  Goal: Optimal Functional Ability  Outcome: Progressing  Goal: Absence of Infection Signs and Symptoms  Outcome: Progressing  Goal: Improved Oral Intake  Outcome: Progressing  Goal: Optimal Pain Control and Function  Outcome: Progressing  Goal: Skin Health and Integrity  Outcome: Progressing  Goal: Optimal Wound Healing  Outcome: Progressing

## 2025-07-10 NOTE — PLAN OF CARE
Pt aaox2. Able to make needs known. Confused about time and situation. Easily reorientated. Pt was compliant with all care. No acute changes noted during this shift. VSS. Bed lowered and locked. Call light and personal items within reach. Non known needs voiced at this time.       Problem: Skin Injury Risk Increased  Goal: Skin Health and Integrity  Outcome: Progressing     Problem: Hemodialysis  Goal: Safe, Effective Therapy Delivery  Outcome: Progressing  Goal: Effective Tissue Perfusion  Outcome: Progressing  Goal: Absence of Infection Signs and Symptoms  Outcome: Progressing     Problem: Adult Inpatient Plan of Care  Goal: Plan of Care Review  Outcome: Progressing  Goal: Patient-Specific Goal (Individualized)  Outcome: Progressing  Goal: Absence of Hospital-Acquired Illness or Injury  Outcome: Progressing  Goal: Optimal Comfort and Wellbeing  Outcome: Progressing  Goal: Readiness for Transition of Care  Outcome: Progressing     Problem: Diabetes Comorbidity  Goal: Blood Glucose Level Within Targeted Range  Outcome: Progressing     Problem: Sepsis/Septic Shock  Goal: Optimal Coping  Outcome: Progressing  Goal: Absence of Bleeding  Outcome: Progressing  Goal: Blood Glucose Level Within Targeted Range  Outcome: Progressing  Goal: Absence of Infection Signs and Symptoms  Outcome: Progressing  Goal: Optimal Nutrition Intake  Outcome: Progressing     Problem: Infection  Goal: Absence of Infection Signs and Symptoms  Outcome: Progressing     Problem: Fall Injury Risk  Goal: Absence of Fall and Fall-Related Injury  Outcome: Progressing     Problem: Wound  Goal: Optimal Coping  Outcome: Progressing  Goal: Optimal Functional Ability  Outcome: Progressing  Goal: Absence of Infection Signs and Symptoms  Outcome: Progressing  Goal: Improved Oral Intake  Outcome: Progressing  Goal: Optimal Pain Control and Function  Outcome: Progressing  Goal: Skin Health and Integrity  Outcome: Progressing  Goal: Optimal Wound  Healing  Outcome: Progressing

## 2025-07-11 LAB
ABSOLUTE EOSINOPHIL (OHS): 0.15 K/UL
ABSOLUTE MONOCYTE (OHS): 0.76 K/UL (ref 0.3–1)
ABSOLUTE NEUTROPHIL COUNT (OHS): 4.59 K/UL (ref 1.8–7.7)
ALBUMIN SERPL BCP-MCNC: 2.4 G/DL (ref 3.5–5.2)
ALP SERPL-CCNC: 117 UNIT/L (ref 40–150)
ALT SERPL W/O P-5'-P-CCNC: <5 UNIT/L (ref 10–44)
ANION GAP (OHS): 11 MMOL/L (ref 8–16)
AST SERPL-CCNC: 9 UNIT/L (ref 11–45)
BASOPHILS # BLD AUTO: 0.09 K/UL
BASOPHILS NFR BLD AUTO: 1.4 %
BILIRUB SERPL-MCNC: 0.3 MG/DL (ref 0.1–1)
BUN SERPL-MCNC: 16 MG/DL (ref 6–20)
CALCIUM SERPL-MCNC: 7.5 MG/DL (ref 8.7–10.5)
CHLORIDE SERPL-SCNC: 104 MMOL/L (ref 95–110)
CO2 SERPL-SCNC: 22 MMOL/L (ref 23–29)
CREAT SERPL-MCNC: 4.5 MG/DL (ref 0.5–1.4)
ERYTHROCYTE [DISTWIDTH] IN BLOOD BY AUTOMATED COUNT: 17.6 % (ref 11.5–14.5)
ESTROGEN SERPL-MCNC: NORMAL PG/ML
GFR SERPLBLD CREATININE-BSD FMLA CKD-EPI: 11 ML/MIN/1.73/M2
GLUCOSE SERPL-MCNC: 153 MG/DL (ref 70–110)
HBV SURFACE AG SERPL QL IA: NORMAL
HCT VFR BLD AUTO: 32.6 % (ref 37–48.5)
HGB BLD-MCNC: 9.9 GM/DL (ref 12–16)
IMM GRANULOCYTES # BLD AUTO: 0.03 K/UL (ref 0–0.04)
IMM GRANULOCYTES NFR BLD AUTO: 0.5 % (ref 0–0.5)
INSULIN SERPL-ACNC: NORMAL U[IU]/ML
LAB AP GROSS DESCRIPTION: NORMAL
LAB AP NON-GYN INTERPRETATION SPECIMEN 1: NORMAL
LAB AP PERFORMING LOCATION(S): NORMAL
LYMPHOCYTES # BLD AUTO: 0.96 K/UL (ref 1–4.8)
MCH RBC QN AUTO: 30.6 PG (ref 27–31)
MCHC RBC AUTO-ENTMCNC: 30.4 G/DL (ref 32–36)
MCV RBC AUTO: 101 FL (ref 82–98)
NUCLEATED RBC (/100WBC) (OHS): 0 /100 WBC
PLATELET # BLD AUTO: 321 K/UL (ref 150–450)
PMV BLD AUTO: 10.1 FL (ref 9.2–12.9)
POCT GLUCOSE: 131 MG/DL (ref 70–110)
POCT GLUCOSE: 165 MG/DL (ref 70–110)
POCT GLUCOSE: 171 MG/DL (ref 70–110)
POCT GLUCOSE: 173 MG/DL (ref 70–110)
POTASSIUM SERPL-SCNC: 4.1 MMOL/L (ref 3.5–5.1)
PROT SERPL-MCNC: 7.2 GM/DL (ref 6–8.4)
RBC # BLD AUTO: 3.24 M/UL (ref 4–5.4)
RELATIVE EOSINOPHIL (OHS): 2.3 %
RELATIVE LYMPHOCYTE (OHS): 14.6 % (ref 18–48)
RELATIVE MONOCYTE (OHS): 11.6 % (ref 4–15)
RELATIVE NEUTROPHIL (OHS): 69.6 % (ref 38–73)
SODIUM SERPL-SCNC: 137 MMOL/L (ref 136–145)
WBC # BLD AUTO: 6.58 K/UL (ref 3.9–12.7)

## 2025-07-11 PROCEDURE — 87340 HEPATITIS B SURFACE AG IA: CPT | Mod: HCNC | Performed by: INTERNAL MEDICINE

## 2025-07-11 PROCEDURE — 85025 COMPLETE CBC W/AUTO DIFF WBC: CPT | Mod: HCNC | Performed by: INTERNAL MEDICINE

## 2025-07-11 PROCEDURE — 25000003 PHARM REV CODE 250: Mod: HCNC | Performed by: INTERNAL MEDICINE

## 2025-07-11 PROCEDURE — 63600175 PHARM REV CODE 636 W HCPCS: Mod: HCNC | Performed by: INTERNAL MEDICINE

## 2025-07-11 PROCEDURE — 99233 SBSQ HOSP IP/OBS HIGH 50: CPT | Mod: HCNC,GC,, | Performed by: STUDENT IN AN ORGANIZED HEALTH CARE EDUCATION/TRAINING PROGRAM

## 2025-07-11 PROCEDURE — 90945 DIALYSIS ONE EVALUATION: CPT | Mod: HCNC

## 2025-07-11 PROCEDURE — 20600001 HC STEP DOWN PRIVATE ROOM: Mod: HCNC

## 2025-07-11 PROCEDURE — 80100016 HC MAINTENANCE HEMODIALYSIS: Mod: HCNC

## 2025-07-11 PROCEDURE — 63700000 PHARM REV CODE 250 ALT 637 W/O HCPCS: Mod: HCNC | Performed by: INTERNAL MEDICINE

## 2025-07-11 PROCEDURE — 80053 COMPREHEN METABOLIC PANEL: CPT | Mod: HCNC | Performed by: INTERNAL MEDICINE

## 2025-07-11 PROCEDURE — 27000207 HC ISOLATION: Mod: HCNC

## 2025-07-11 RX ORDER — HEPARIN SODIUM 1000 [USP'U]/ML
2000 INJECTION, SOLUTION INTRAVENOUS; SUBCUTANEOUS
Status: DISCONTINUED | OUTPATIENT
Start: 2025-07-11 | End: 2025-07-12 | Stop reason: HOSPADM

## 2025-07-11 RX ORDER — SODIUM CHLORIDE 9 MG/ML
INJECTION, SOLUTION INTRAVENOUS ONCE
Status: DISCONTINUED | OUTPATIENT
Start: 2025-07-12 | End: 2025-07-12 | Stop reason: HOSPADM

## 2025-07-11 RX ORDER — HEPARIN SODIUM 1000 [USP'U]/ML
1000 INJECTION, SOLUTION INTRAVENOUS; SUBCUTANEOUS
Status: DISCONTINUED | OUTPATIENT
Start: 2025-07-12 | End: 2025-07-12 | Stop reason: HOSPADM

## 2025-07-11 RX ADMIN — HYDRALAZINE HYDROCHLORIDE 10 MG: 10 TABLET, FILM COATED ORAL at 09:07

## 2025-07-11 RX ADMIN — SODIUM BICARBONATE 650 MG TABLET 650 MG: at 12:07

## 2025-07-11 RX ADMIN — APIXABAN 5 MG: 5 TABLET, FILM COATED ORAL at 12:07

## 2025-07-11 RX ADMIN — POLYETHYLENE GLYCOL 3350 17 G: 17 POWDER, FOR SOLUTION ORAL at 12:07

## 2025-07-11 RX ADMIN — FUROSEMIDE 40 MG: 40 TABLET ORAL at 12:07

## 2025-07-11 RX ADMIN — HYDRALAZINE HYDROCHLORIDE 10 MG: 10 TABLET, FILM COATED ORAL at 12:07

## 2025-07-11 RX ADMIN — MUPIROCIN: 20 OINTMENT TOPICAL at 12:07

## 2025-07-11 RX ADMIN — FLUCONAZOLE 100 MG: 100 TABLET ORAL at 12:07

## 2025-07-11 RX ADMIN — ERTAPENEM 500 MG: 1 INJECTION INTRAMUSCULAR; INTRAVENOUS at 12:07

## 2025-07-11 RX ADMIN — CEFTAZIDIME: 1 INJECTION, POWDER, FOR SOLUTION INTRAMUSCULAR; INTRAVENOUS at 09:07

## 2025-07-11 RX ADMIN — ISOSORBIDE DINITRATE 5 MG: 5 TABLET ORAL at 09:07

## 2025-07-11 RX ADMIN — CLOPIDOGREL BISULFATE 75 MG: 75 TABLET, FILM COATED ORAL at 12:07

## 2025-07-11 RX ADMIN — SODIUM BICARBONATE 650 MG TABLET 650 MG: at 09:07

## 2025-07-11 RX ADMIN — SACUBITRIL AND VALSARTAN 1 TABLET: 24; 26 TABLET, FILM COATED ORAL at 09:07

## 2025-07-11 RX ADMIN — CARVEDILOL 6.25 MG: 6.25 TABLET, FILM COATED ORAL at 09:07

## 2025-07-11 RX ADMIN — ERYTHROMYCIN: 5 OINTMENT OPHTHALMIC at 05:07

## 2025-07-11 RX ADMIN — CARVEDILOL 6.25 MG: 6.25 TABLET, FILM COATED ORAL at 12:07

## 2025-07-11 RX ADMIN — CINACALCET HYDROCHLORIDE 30 MG: 30 TABLET, FILM COATED ORAL at 05:07

## 2025-07-11 RX ADMIN — MUPIROCIN: 20 OINTMENT TOPICAL at 09:07

## 2025-07-11 RX ADMIN — SERTRALINE 100 MG: 100 TABLET, FILM COATED ORAL at 12:07

## 2025-07-11 RX ADMIN — SEVELAMER CARBONATE 2.4 G: 2400 POWDER, FOR SUSPENSION ORAL at 12:07

## 2025-07-11 RX ADMIN — ERYTHROMYCIN: 5 OINTMENT OPHTHALMIC at 06:07

## 2025-07-11 RX ADMIN — ALLOPURINOL 100 MG: 100 TABLET ORAL at 12:07

## 2025-07-11 RX ADMIN — ISOSORBIDE DINITRATE 5 MG: 5 TABLET ORAL at 12:07

## 2025-07-11 RX ADMIN — SACUBITRIL AND VALSARTAN 1 TABLET: 24; 26 TABLET, FILM COATED ORAL at 12:07

## 2025-07-11 RX ADMIN — FUROSEMIDE 40 MG: 40 TABLET ORAL at 09:07

## 2025-07-11 RX ADMIN — ATORVASTATIN CALCIUM 80 MG: 40 TABLET, FILM COATED ORAL at 12:07

## 2025-07-11 RX ADMIN — SEVELAMER CARBONATE 2.4 G: 2400 POWDER, FOR SUSPENSION ORAL at 05:07

## 2025-07-11 RX ADMIN — APIXABAN 5 MG: 5 TABLET, FILM COATED ORAL at 09:07

## 2025-07-11 RX ADMIN — ERYTHROMYCIN: 5 OINTMENT OPHTHALMIC at 12:07

## 2025-07-11 NOTE — PROGRESS NOTES
[facility] Hospital Medicine  Progress Note    Patient Name: Suyapa Connelly  MRN: [id number]  Patient Class: IP- Inpatient   Admission Date: 7/17/2020  Length of Stay: 9 days  Attending Physician: Najma Alfred MD  Primary Care Provider: Erlinda Rahman NP        Subjective:     Principal Problem:Acute osteomyelitis of left calcaneus        HPI:  Suyapa Connelly is a 54 y.o. female that (in part)  has a past medical history of Anxiety, Depression, Diabetes mellitus, GERD (gastroesophageal reflux disease), Hyperlipemia, Hypertension, and Myocardial infarction. Patient has a past surgical history that includes Angiogram - Right Extremity (Right, 7/9/15); angiogram-left leg (10/6/15); Catheterization of both left and right heart (N/A, 12/18/2019); Coronary angiography (N/A, 12/18/2019); Coronary Artery Bypass Graft (CABG) (N/A, 1/14/2020); and Insertion of tunneled central venous hemodialysis catheter (N/A, 1/27/2020). Presents to [facility] Emergency Department complaining of the left worsening heel foot wound. Poor wound healing.  Patient became concerned due to malodorous drainage.  Pain with ambulation and weight-bearing.  Associated hyperglycemia.  Denies fever chills.    In the emergency department physical exam concerning for osteomyelitis secondary to diabetic foot ulcer in the setting of uncontrolled diabetes.  Routine laboratory studies revealed hyperglycemia.  X-ray of the foot performed.  Concerning for osteomyelitis.  MRI confirmed this.  Podiatry was consulted.  Broad-spectrum antibiotics were initiated in the ED a special consideration given to risk of Pseudomonas infection.    Hospital medicine has been asked to admit to inpatient for further evaluation and treatment.     Overview/Hospital Course:  Kyaw Connelly is a 54 y.o. female that (in part)  has a past medical history of Anxiety, Depression, Diabetes mellitus, GERD (gastroesophageal reflux  [de-identified] : We discussed further treatment options. We discussed non-surgical and surgical options. She would like to continue with non-surgical options. She will be evaluated for epidural injections. A referral was given. Follow-up 4 weeks.  disease), Hyperlipemia, Hypertension, and Myocardial infarction.  has a past surgical history that includes Angiogram - Right Extremity (Right, 7/9/15); angiogram-left leg (10/6/15); Catheterization of both left and right heart (N/A, 12/18/2019); Coronary angiography (N/A, 12/18/2019); Coronary Artery Bypass Graft (CABG) (N/A, 1/14/2020); and Insertion of tunneled central venous hemodialysis catheter (N/A, 1/27/2020). Presents to [facility] Emergency Department complaining of the left heel foot wound.  MRI confirmed osteo. Podiatry, ID, and vascular were consulted.  Podiatry discussed options with patient. She has chosen 6 weeks of IV abx. ID has been consulted to tailor abx. She has been continued on Aztreonam.  Blood cultures were NG. Bone bx on 7/18 has grown Enterococcus faecalis, Grp B Strep, and Enterobacter cloacae. Vascular Surgery consulted and did an angiogram which revealed adequate artery for access the left SFA and AK popliteal artery occlusion.  Patient with renal failure and Nephrology consulted.  Renal function continues to improve and ok to proceed with vascular intervention.  Podiatry planning debridement post vascular procedure.  Creatinine peaked at 2.4 and is currently 1.2.      Interval History:  Patient had an episode of emesis last p.m. and also this a.m. when attempting morning medications.  She denies preceding nausea or abdominal pain.    Review of Systems   Gastrointestinal: Positive for vomiting.     Objective:     Vital Signs (Most Recent):  Temp: 97.7 °F (36.5 °C) (07/26/20 1106)  Pulse: 84 (07/26/20 1106)  Resp: 17 (07/26/20 1106)  BP: (!) 147/72 (07/26/20 1106)  SpO2: 98 % (07/26/20 1106) Vital Signs (24h Range):  Temp:  [97.6 °F (36.4 °C)-98.5 °F (36.9 °C)] 97.7 °F (36.5 °C)  Pulse:  [84-95] 84  Resp:  [16-20] 17  SpO2:  [97 %-100 %] 98 %  BP: (137-160)/(66-86) 147/72     Weight: 87.1 kg (192 lb)  Body mass index is 31.95 kg/m².    Intake/Output Summary (Last  24 hours) at 7/26/2020 1305  Last data filed at 7/26/2020 1238  Gross per 24 hour   Intake 200 ml   Output 200 ml   Net 0 ml      Physical Exam  Vitals signs and nursing note reviewed.   Constitutional:       General: She is not in acute distress.     Appearance: She is well-developed. She is obese.   HENT:      Head: Normocephalic and atraumatic.      Right Ear: Hearing and external ear normal.      Left Ear: Hearing and external ear normal.      Nose: Nose normal.      Mouth/Throat:      Mouth: Mucous membranes are moist.      Pharynx: Oropharynx is clear. Uvula midline. No oropharyngeal exudate.   Eyes:      Conjunctiva/sclera: Conjunctivae normal.      Pupils: Pupils are equal, round, and reactive to light.   Neck:      Musculoskeletal: Normal range of motion and neck supple.      Thyroid: No thyromegaly.      Trachea: No tracheal deviation.   Cardiovascular:      Rate and Rhythm: Normal rate and regular rhythm.      Pulses:           Left popliteal pulse not accessible.        Dorsalis pedis pulses are 1+ on the right side.      Heart sounds: Normal heart sounds. No murmur.   Pulmonary:      Effort: Pulmonary effort is normal. No respiratory distress.      Breath sounds: Normal breath sounds.   Abdominal:      General: Bowel sounds are normal.      Palpations: Abdomen is soft.      Tenderness: There is no abdominal tenderness.   Musculoskeletal: Normal range of motion.         General: No tenderness.      Comments: Left foot wrapped with gauze dressing.  Unable to palpate pulses.   Feet:      Comments: Left foot bandage CDI  Skin:     General: Skin is warm and dry.      Coloration: Skin is not pale.   Neurological:      General: No focal deficit present.      Mental Status: She is alert and oriented to person, place, and time. Mental status is at baseline.   Psychiatric:         Attention and Perception: Attention normal.         Mood and Affect: Mood normal.         Speech: Speech normal.         Behavior:  Behavior normal.         Thought Content: Thought content normal.         Judgment: Judgment normal.         Significant Labs:   CBC:   Recent Labs   Lab 07/25/20  0102   WBC 7.64   HGB 8.6*   HCT 27.3*        CMP:   Recent Labs   Lab 07/25/20  0530      K 4.3      CO2 26   *   BUN 22*   CREATININE 1.2   CALCIUM 8.3*   ANIONGAP 5*   EGFRNONAA 52*       Significant Imaging: I have reviewed and interpreted all pertinent imaging results/findings within the past 24 hours.      Assessment/Plan:      * Acute osteomyelitis of left calcaneus  - MRI confirms osteo. Bone Bx- 7/18  - Afebrile and with resolved leukocytosis   - Wound cultures with Enterococcus, E cloacae, and GBS.   - ID consulted for ABx recommendations and will d/c axtreoman and start cefepime 2g q12h; continue vancomycin   - Vascular Surgery consulted and planning angiogram 7/24/2020.  - Patient declines amputation and wants to try conservative tx with  IV ABx's.   - CKD and Nephrology consulted.  Vascular discussed case with Nephrology and patient cleared from renal for vascular intervention.    - Podiatry planning debridement post vascular procedure possibly 7/27/2020.           Preop cardiovascular exam        Hyponatremia  - resolved  - Monitor with daily labs      Stage 3 chronic kidney disease  Appreciate Nephrology input.  Patient did have acute on chronic CKD 3 which has now resolved  Hyperkalemia on 7/21.  Treated with Calcium, Albuterol and Kayexalate.  Resolved         Non healing left heel wound  Continue current wound care  Provide adequate analgesia prior to daily wound care      S/P CABG x 1  No acute issues       Mixed hyperlipidemia  Stable      Chronic combined systolic and diastolic heart failure  No acute issues   EF 50% on recent echo         Essential hypertension  Continue current management.  Adequate control      Type 2 diabetes mellitus without complication, with long-term current use of  insulin  Uncontrolled with hyperglycemia. Manifestations likely of CKD 3      - Will check an A1c- > 14.  - Glucose increased secondary to steroids.  - Added insulin with meals and will increase prandial insulin to 8U and increased basal to 15U for better control of blood glucose            Peripheral artery disease  Patient requires revascularization of the left lower extremity        VTE Risk Mitigation (From admission, onward)         Ordered     heparin (porcine) injection 5,000 Units  Every 8 hours      07/17/20 2217     IP VTE HIGH RISK PATIENT  Once      07/17/20 2217     Place sequential compression device  Until discontinued      07/17/20 2217     Place HAILEY hose  Until discontinued      07/17/20 2217                      Najma Alfred MD  Department of Hospital Medicine   [facility]

## 2025-07-11 NOTE — ASSESSMENT & PLAN NOTE
Creatine stable for now. BMP reviewed- noted Estimated Creatinine Clearance: 11.7 mL/min (A) (based on SCr of 4.7 mg/dL (H)). according to latest data. Based on current GFR, CKD stage is end stage.  Monitor UOP and serial BMP and adjust therapy as needed. Renally dose meds. Avoid nephrotoxic medications and procedures.  Planning for PD with weekly HD. CM on board

## 2025-07-11 NOTE — ASSESSMENT & PLAN NOTE
Hb remains at goal.    -Defer EPO administration at this time but will continue to assess need with HD sessions

## 2025-07-11 NOTE — ASSESSMENT & PLAN NOTE
Hyperkalemia is likely due to ESRD.The patients most recent potassium results are listed below.  Recent Labs     07/09/25  0755   K 3.7     Plan  - Monitor for arrhythmias with EKG and/or continuous telemetry.   - Treat the hyperkalemia with Potassium Binders.   - Monitor potassium: daily  - The patient's hyperkalemia is improved following urgent HD   - Nephrology consulted

## 2025-07-11 NOTE — ASSESSMENT & PLAN NOTE
Nephrology History  -Outpatient HD unit: [facility]  -Nephrologist:   -HD tx days: Previously MWF; now once weekly only as transitioning to PD  -Last HD tx: 6/27 prior to hospitalization  -HD access: tunneled IJ cath  -HD modality: iHD/PD  -Residual urine: +  -EDW: 67kg?    - HD today per MWF schedule, UF 3L as tolerated  - Pt to continue HD per MWF on discharge  - Strict I&O, pre and post dialysis weights  - Renal diet encouraged

## 2025-07-11 NOTE — ASSESSMENT & PLAN NOTE
Patient has persistent (7 days or more) atrial fibrillation. Patient is currently in sinus rhythm. [id number] Score: 3. The patients heart rate in the last 24 hours is as follows:  Pulse  Min: 79  Max: 89     Antiarrhythmics       Anticoagulants  heparin (porcine) injection 1,000 Units, As needed (PRN), Intra-Catheter  apixaban tablet 5 mg, 2 times daily, Oral    Plan  - Replete lytes with a goal of K>4, Mg >2  - Patient is anticoagulated, see medications listed above.  - Patient's afib is currently controlled

## 2025-07-11 NOTE — SUBJECTIVE & OBJECTIVE
Interval History: NAEON. No acute issues. Currently on ertapenem.    Review of Systems  Objective:     Vital Signs (Most Recent):  Temp: 98.6 °F (37 °C) (07/10/25 1952)  Pulse: 89 (07/10/25 1952)  Resp: 18 (07/10/25 1952)  BP: (!) 106/57 (07/10/25 1952)  SpO2: 99 % (07/10/25 1952) Vital Signs (24h Range):  Temp:  [98 °F (36.7 °C)-98.9 °F (37.2 °C)] 98.6 °F (37 °C)  Pulse:  [79-89] 89  Resp:  [11-19] 18  SpO2:  [96 %-100 %] 99 %  BP: ()/(53-67) 106/57     Weight: 64 kg (141 lb 1.5 oz)  Body mass index is 23.48 kg/m².    Intake/Output Summary (Last 24 hours) at 7/10/2025 2018  Last data filed at 7/10/2025 1710  Gross per 24 hour   Intake 200 ml   Output 150 ml   Net 50 ml         Physical Exam  Constitutional:       General: She is not in acute distress.     Appearance: She is ill-appearing.   HENT:      Head: Normocephalic.      Nose: Nose normal.      Mouth/Throat:      Mouth: Mucous membranes are moist.   Cardiovascular:      Rate and Rhythm: Normal rate.   Pulmonary:      Effort: Pulmonary effort is normal.   Abdominal:      General: Abdomen is flat.   Musculoskeletal:         General: Normal range of motion.      Cervical back: Normal range of motion.   Skin:     General: Skin is warm.   Neurological:      General: No focal deficit present.               Significant Labs: All pertinent labs within the past 24 hours have been reviewed.  BMP:   Recent Labs   Lab 07/09/25  0755   *   *   K 3.7      CO2 28   BUN 21*   CREATININE 4.7*   CALCIUM 7.7*       Significant Imaging: I have reviewed all pertinent imaging results/findings within the past 24 hours.

## 2025-07-11 NOTE — PLAN OF CARE
Patient is being trained on home PD and receiving HD once a week at [facility].  Per Patient's , she is scheduled to receive HD Monday at [facility].    Emmett Pedro RN, BSN

## 2025-07-11 NOTE — PROGRESS NOTES
07/11/25 1130   Post-Hemodialysis Assessment   Blood Volume Processed (Liters) 77.4 L   Duration of Treatment 210 minutes   Net Fluid Removal 2500     HD tx completed, blood returned and PC heparin locked without issue. Pt in NAD/VSS, report called to primary RN. Waiting on transport back to unit.    1140 Back to unit via stretcher

## 2025-07-11 NOTE — PLAN OF CARE
Pt AAOx2. NAD. VSS on room air. Purwick to suction wall canister, pt oliguric. Pt had HD this morning, pt tolerated well. No acute events this shift. Pt on immerse mattress, pt can turn with stand-by assist. Safety measures in place. No concerns or complaints at present time. Call light in reach.    Problem: Skin Injury Risk Increased  Goal: Skin Health and Integrity  Outcome: Progressing     Problem: Hemodialysis  Goal: Safe, Effective Therapy Delivery  Outcome: Progressing  Goal: Effective Tissue Perfusion  Outcome: Progressing  Goal: Absence of Infection Signs and Symptoms  Outcome: Progressing     Problem: Adult Inpatient Plan of Care  Goal: Plan of Care Review  Outcome: Progressing  Goal: Patient-Specific Goal (Individualized)  Outcome: Progressing  Goal: Absence of Hospital-Acquired Illness or Injury  Outcome: Progressing  Goal: Optimal Comfort and Wellbeing  Outcome: Progressing  Goal: Readiness for Transition of Care  Outcome: Progressing     Problem: Diabetes Comorbidity  Goal: Blood Glucose Level Within Targeted Range  Outcome: Progressing     Problem: Sepsis/Septic Shock  Goal: Optimal Coping  Outcome: Progressing  Goal: Absence of Bleeding  Outcome: Progressing  Goal: Blood Glucose Level Within Targeted Range  Outcome: Progressing  Goal: Absence of Infection Signs and Symptoms  Outcome: Progressing  Goal: Optimal Nutrition Intake  Outcome: Progressing     Problem: Infection  Goal: Absence of Infection Signs and Symptoms  Outcome: Progressing     Problem: Fall Injury Risk  Goal: Absence of Fall and Fall-Related Injury  Outcome: Progressing     Problem: Wound  Goal: Optimal Coping  Outcome: Progressing  Goal: Optimal Functional Ability  Outcome: Progressing  Goal: Absence of Infection Signs and Symptoms  Outcome: Progressing  Goal: Improved Oral Intake  Outcome: Progressing  Goal: Optimal Pain Control and Function  Outcome: Progressing  Goal: Skin Health and Integrity  Outcome: Progressing  Goal: Optimal  Wound Healing  Outcome: Progressing

## 2025-07-11 NOTE — PROGRESS NOTES
[facility] Medicine  Progress Note    Patient Name: Suyapa Connelly  MRN: [id number]  Patient Class: IP- Inpatient   Admission Date: 7/3/2025  Length of Stay: 5 days  Attending Physician: Timmy Hu MD  Primary Care Provider: Vanessa Noel MD        Subjective     Principal Problem:Hyperkalemia        HPI:  60 yo F with ESRD, previously on thrice-weekly HD, now transitioning to home PD and currently receiving once weekly HD, last session 1 week ago, presenting to ED with dysuria, weakness, HA, N/V. K 7.0. EKG ordered, pending. Calcium, insulin, D50, albuterol, Lokelma ordered for medical management.      reports she is more somnolent, CT of head is pending.    Overview/Hospital Course:  Found to have ESBL klebsiella UTI, ID consulted    7/09 Will need Ertapenem (renal dose) x 5 days (EOC:7/12/25).     Interval History: NAEON. No acute issues. Currently on ertapenem.    Review of Systems  Objective:     Vital Signs (Most Recent):  Temp: 98.6 °F (37 °C) (07/10/25 1952)  Pulse: 89 (07/10/25 1952)  Resp: 18 (07/10/25 1952)  BP: (!) 106/57 (07/10/25 1952)  SpO2: 99 % (07/10/25 1952) Vital Signs (24h Range):  Temp:  [98 °F (36.7 °C)-98.9 °F (37.2 °C)] 98.6 °F (37 °C)  Pulse:  [79-89] 89  Resp:  [11-19] 18  SpO2:  [96 %-100 %] 99 %  BP: ()/(53-67) 106/57     Weight: 64 kg (141 lb 1.5 oz)  Body mass index is 23.48 kg/m².    Intake/Output Summary (Last 24 hours) at 7/10/2025 2018  Last data filed at 7/10/2025 1710  Gross per 24 hour   Intake 200 ml   Output 150 ml   Net 50 ml         Physical Exam  Constitutional:       General: She is not in acute distress.     Appearance: She is ill-appearing.   HENT:      Head: Normocephalic.      Nose: Nose normal.      Mouth/Throat:      Mouth: Mucous membranes are moist.   Cardiovascular:      Rate and Rhythm: Normal rate.   Pulmonary:      Effort: Pulmonary effort is normal.   Abdominal:      General: Abdomen is flat.    Musculoskeletal:         General: Normal range of motion.      Cervical back: Normal range of motion.   Skin:     General: Skin is warm.   Neurological:      General: No focal deficit present.               Significant Labs: All pertinent labs within the past 24 hours have been reviewed.  BMP:   Recent Labs   Lab 07/09/25  0755   *   *   K 3.7      CO2 28   BUN 21*   CREATININE 4.7*   CALCIUM 7.7*       Significant Imaging: I have reviewed all pertinent imaging results/findings within the past 24 hours.      Assessment & Plan  Hyperkalemia  Hyperkalemia is likely due to ESRD.The patients most recent potassium results are listed below.  Recent Labs     07/09/25  0755   K 3.7     Plan  - Monitor for arrhythmias with EKG and/or continuous telemetry.   - Treat the hyperkalemia with Potassium Binders.   - Monitor potassium: daily  - The patient's hyperkalemia is improved following urgent HD   - Nephrology consulted  S/P CABG x 1  stable    Anemia due to chronic kidney disease, on chronic dialysis  Anemia is likely due to chronic disease due to ESRD. Most recent hemoglobin and hematocrit are listed below.  Recent Labs     07/09/25  0755   HGB 10.0*   HCT 32.3*       Plan  - Monitor serial CBC: Daily  - Transfuse PRBC if patient becomes hemodynamically unstable, symptomatic or H/H drops below 7/21.  - Patient has not received any PRBC transfusions to date  - Patient's anemia is currently stable    Paroxysmal atrial fibrillation  Patient has persistent (7 days or more) atrial fibrillation. Patient is currently in sinus rhythm. [id number] Score: 3. The patients heart rate in the last 24 hours is as follows:  Pulse  Min: 79  Max: 89     Antiarrhythmics       Anticoagulants  heparin (porcine) injection 1,000 Units, As needed (PRN), Intra-Catheter  apixaban tablet 5 mg, 2 times daily, Oral    Plan  - Replete lytes with a goal of K>4, Mg >2  - Patient is anticoagulated, see medications listed above.  -  Patient's afib is currently controlled    Encephalopathy  Etiology UTI  CTH: Sulcal crowding at the apex with vague sulcal hyperattenuation, suggestive of pseudosubarachnoid hemorrhage, favoring mild cerebral edema.     MRI: No evidence for acute infarction   Mental status back to baseline    ESRD (end stage renal disease)  Creatine stable for now. BMP reviewed- noted Estimated Creatinine Clearance: 11.7 mL/min (A) (based on SCr of 4.7 mg/dL (H)). according to latest data. Based on current GFR, CKD stage is end stage.  Monitor UOP and serial BMP and adjust therapy as needed. Renally dose meds. Avoid nephrotoxic medications and procedures.  Planning for PD with weekly HD. CM on board   Moderate malnutrition  Nutrition consulted. Most recent weight and BMI monitored-     Measurements:  Wt Readings from Last 1 Encounters:   07/04/25 64 kg (141 lb 1.5 oz)   Body mass index is 23.48 kg/m².    Patient has been screened and assessed by RD.    Malnutrition Type:  Context: chronic illness  Level: moderate    Malnutrition Characteristic Summary:  Weight Loss (Malnutrition): 20% in 1 year  Energy Intake (Malnutrition): less than 75% for greater than 7 days  Subcutaneous Fat (Malnutrition): mild depletion  Muscle Mass (Malnutrition): mild depletion  Fluid Accumulation (Malnutrition): moderate    Interventions/Recommendations (treatment strategy):  1.)    Urinary tract infection due to ESBL Klebsiella  5 Days of ertapenem, End date 7/012  ID consulted    Debility  Patient with 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 The patient's latest AMPAC (Activity Measure for Post Acute Care) Score is listed below.    AM-PAC Score - How much help does the patient need for each activity listed  Basic Mobility Total Score: 10  Turning over in bed (including adjusting bedclothes, sheets and blankets)?: A little  Sitting down on and standing up from a chair with arms (e.g., wheelchair, bedside commode,  etc.): Unable  Moving from lying on back to sitting on the side of the bed?: Unable  Moving to and from a bed to a chair (including a wheelchair)?: A little  Need to walk in hospital room?: Unable  Climbing 3-5 steps with a railing?: Unable    Plan  - Progressive mobility protocol initated  - Fall precaution      VTE Risk Mitigation (From admission, onward)           Ordered     apixaban tablet 5 mg  2 times daily         07/10/25 1039     heparin (porcine) injection 1,000 Units  As needed (PRN)         07/09/25 0933     heparin (porcine) injection 1,000 Units  As needed (PRN)         07/07/25 1002     IP VTE HIGH RISK PATIENT  Once         07/03/25 1620     Place sequential compression device  Until discontinued         07/03/25 1620     heparin (porcine) injection 1,000 Units  As needed (PRN)         07/03/25 1500                    Discharge Planning   DEVAN: 7/11/2025     Code Status: Full Code   Medical Readiness for Discharge Date:   Discharge Plan A: Home with family                        Timmy Hu MD  Department of Hospital Medicine   [facility] - Acute Medical Stepdown

## 2025-07-11 NOTE — PROGRESS NOTES
07/10/25 2114        Peritoneal Dialysis Catheter Mid lower abdomen   No placement date or time found.   Present Prior to Hospital Arrival?: Yes  Catheter Location: Mid lower abdomen   Site Assessment Clean;Dry;Intact   Dressing Intervention Sterile dressing change   Status Accessed   Dressing Status Clean;Dry;Intact   Dressing Gauze   Securement secured to abdomen with tape   Clamp Status clamped   Peritoneal Dialysis   Exchange Type Manual   Peritoneal Treatment Status Started   Dianeal Solution Dextrose 1.5% in 2000 mL   Dianeal Antibiotic cvef and vanc     Report received from primary RN. Manual fill completed

## 2025-07-11 NOTE — ASSESSMENT & PLAN NOTE
>>ASSESSMENT AND PLAN FOR S/P CABG X 1 WRITTEN ON 7/10/2025  8:28 PM BY HAND, ALYSIA BLANCA MD    stable

## 2025-07-11 NOTE — PROGRESS NOTES
[facility]  Nephrology  Progress Note    Patient Name: Suyapa Connelly  MRN: [id number]  Admission Date: 7/3/2025  Hospital Length of Stay: 6 days  Attending Provider: Timmy Hu MD   Primary Care Physician: Vanessa Noel MD  Principal Problem:Hyperkalemia    Subjective:     HPI: Patient is a 59-year-old female with past medical history as stated below significant for ESRD secondary to hypertensive disease previously on HD MWF schedule via tunneled IJ cath currently transitioning to home PD, type II diabetes mellitus, hypertension, hyperlipidemia, severe PAD s/p bilateral AKA c/b poor healing, L common iliac stent, CAD s/p CABG, HFrEF, obesity, pAF, depression, CAROLIN, mod-sev TR who presented to ED for lethargy, nausea/vomiting, dysuria and abnormal urine appearance. She is accompanied by her  who provides the majority of the history as patient appears to be somnolent during exam.   endorses that patient has been having nausea/vomiting and lethargy for the past few days but the dysuria started today. They are currently training for peritoneal dialysis at home. She had the peritoneal dialysis catheter placed 2-3 weeks ago and has been training over the past week. Her last full hemodialysis session was approximately one week ago. She went to her ophthalmologist today with eye pain and blurry vision; she was diagnosed with blepheritis. However, was recommended to present to ED for further evaluation of her lethargy and urinary symptoms.  ER Course: Afebrile and otherwise hemodynamically stable on room air. CBC with normal wbc, stable anemia with hemoglobin 11, normal platelets. CMP notable for hyponatremia 129, hyperkalemia 7.0, metabolic acidosis with bicarb 11, BUN/sCr 113/9.8.  EKG personally reviewed with T wave changes most significant in leads V2 and V3. Nephrology consulted for hyperkalemia and ESRD management.     Interval History: Patient evaluated at bedside  during HD this morning. She is resting comfortably and tolerating HD well. She denies any acute concerns at this time.    Review of patient's allergies indicates:   Allergen Reactions    Ciprofloxacin Itching    Pcn [penicillins]      Rash; tolerated ceftriaxone on 1/13/20  Tolerating Augmentin November 2024     Current Facility-Administered Medications   Medication Frequency    0.9% NaCl infusion PRN    0.9% NaCl infusion Once    0.9% NaCl infusion Once    [START ON 7/12/2025] 0.9% NaCl infusion Once    acetaminophen tablet 650 mg Q8H PRN    allopurinoL tablet 100 mg Daily    apixaban tablet 5 mg BID    atorvastatin tablet 80 mg Daily    carvediloL tablet 6.25 mg BID    cinacalcet tablet 30 mg Every Mon, Wed, Fri    clopidogreL tablet 75 mg Daily    dextrose 50% injection 12.5 g PRN    dextrose 50% injection 25 g PRN    ertapenem (INVANZ) 500 mg in 0.9% NaCl 100 mL IVPB Q24H    erythromycin 5 mg/gram (0.5 %) ophthalmic ointment Q6H    famotidine tablet 20 mg Q48H    fluconazole tablet 100 mg Daily    furosemide tablet 40 mg BID    glucagon (human recombinant) injection 1 mg PRN    glucose chewable tablet 16 g PRN    glucose chewable tablet 24 g PRN    [START ON 7/12/2025] heparin (porcine) injection 1,000 Units PRN    heparin (porcine) injection 2,000 Units PRN    hydrALAZINE tablet 10 mg Q12H    HYDROmorphone injection 0.5 mg Q6H PRN    insulin aspart U-100 pen 0-5 Units QID (AC + HS) PRN    isosorbide dinitrate tablet 5 mg BID    mupirocin 2 % ointment BID    oxyCODONE immediate release tablet 5 mg Q6H PRN    Peritoneal Dialysis Solution with Additives Once    Peritoneal Dialysis Solution with Additives Once    polyethylene glycol packet 17 g Daily    promethazine 12.5 mg in 0.9% NaCl 50 mL IVPB Q6H PRN    sacubitriL-valsartan 24-26 mg per tablet 1 tablet BID    sertraline tablet 100 mg Daily    sevelamer carbonate 2.4 gram powder 2.4 g TID WM    sodium bicarbonate tablet 650 mg BID    sodium chloride 0.9% bolus  250 mL 250 mL PRN    sodium chloride 0.9% flush 10 mL PRN    trazodone split tablet 25 mg Nightly PRN       Objective:     Vital Signs (Most Recent):  Temp: 98 °F (36.7 °C) (07/11/25 0715)  Pulse: 84 (07/11/25 1130)  Resp: 15 (07/11/25 0715)  BP: 130/64 (07/11/25 1130)  SpO2: 99 % (07/11/25 0715) Vital Signs (24h Range):  Temp:  [97.9 °F (36.6 °C)-98.7 °F (37.1 °C)] 98 °F (36.7 °C)  Pulse:  [79-89] 84  Resp:  [10-18] 15  SpO2:  [97 %-100 %] 99 %  BP: ()/(50-71) 130/64     Weight: 64 kg (141 lb 1.5 oz) (07/04/25 1204)  Body mass index is 23.48 kg/m².  Body surface area is 1.71 meters squared.    I/O last 3 completed shifts:  In: 200 [P.O.:200]  Out: 450 [Urine:450]     Physical Exam  Vitals and nursing note reviewed.   Constitutional:       Appearance: Normal appearance. She is ill-appearing (chronically).   Eyes:      General: No scleral icterus.     Extraocular Movements: Extraocular movements intact.   Cardiovascular:      Rate and Rhythm: Normal rate and regular rhythm.      Pulses: Normal pulses.   Pulmonary:      Effort: Pulmonary effort is normal. No respiratory distress.   Musculoskeletal:         General: Deformity (tunneled HD cath; bilateral BKA) present. Normal range of motion.   Skin:     General: Skin is warm and dry.   Neurological:      General: No focal deficit present.      Mental Status: She is alert. Mental status is at baseline.   Psychiatric:         Mood and Affect: Mood normal.          Significant Labs:  All labs within the past 24 hours have been reviewed.     Significant Imaging:  Labs: Reviewed  Assessment/Plan:     Neuro  Encephalopathy  Improving. Infectious vs uremic etiology    - Vancomycin/ceftazidine with PD during admission for possible peritonitis; discontinue on discharge  - On IV ertapenem for Klebsiella ESBL UTI  -Continue to monitor symptoms    Cardiac/Vascular  Paroxysmal atrial fibrillation  - Management per primary team    Renal/  ESRD (end stage renal  disease)  Nephrology History  -Outpatient HD [facility]: [facility]  -Nephrologist:   -HD tx days: Previously MWF; now once weekly only as transitioning to PD  -Last HD tx: 6/27 prior to hospitalization  -HD access: tunneled IJ cath  -HD modality: iHD/PD  -Residual urine: +  -EDW: 67kg?    - HD today per MWF schedule, UF 3L as tolerated  - Pt to continue HD per MWF on discharge  - Strict I&O, pre and post dialysis weights  - Renal diet encouraged        Urinary tract infection due to ESBL Klebsiella  On IV ertapenem   - Management per primary and ID team    Oncology  Anemia due to chronic kidney disease, on chronic dialysis  Hb remains at goal.    -Defer EPO administration at this time but will continue to assess need with HD sessions      Patient is at high risk for mortality due to Klebsiella ESBL UTI with suspected peritonitis, ESRD on HD, anemia, bilateral AKA, depression with ongoing physical debility and other chronic medical conditions.     Thank you for your consult. I will follow-up with patient. Please contact us if you have any additional questions.    Hayley Paniagua MD  Nephrology PGY-4  [facility]

## 2025-07-11 NOTE — SUBJECTIVE & OBJECTIVE
Interval History: Patient evaluated at bedside during HD this morning. She is resting comfortably and tolerating HD well. She denies any acute concerns at this time.    Review of patient's allergies indicates:   Allergen Reactions    Ciprofloxacin Itching    Pcn [penicillins]      Rash; tolerated ceftriaxone on 1/13/20  Tolerating Augmentin November 2024     Current Facility-Administered Medications   Medication Frequency    0.9% NaCl infusion PRN    0.9% NaCl infusion Once    0.9% NaCl infusion Once    [START ON 7/12/2025] 0.9% NaCl infusion Once    acetaminophen tablet 650 mg Q8H PRN    allopurinoL tablet 100 mg Daily    apixaban tablet 5 mg BID    atorvastatin tablet 80 mg Daily    carvediloL tablet 6.25 mg BID    cinacalcet tablet 30 mg Every Mon, Wed, Fri    clopidogreL tablet 75 mg Daily    dextrose 50% injection 12.5 g PRN    dextrose 50% injection 25 g PRN    ertapenem (INVANZ) 500 mg in 0.9% NaCl 100 mL IVPB Q24H    erythromycin 5 mg/gram (0.5 %) ophthalmic ointment Q6H    famotidine tablet 20 mg Q48H    fluconazole tablet 100 mg Daily    furosemide tablet 40 mg BID    glucagon (human recombinant) injection 1 mg PRN    glucose chewable tablet 16 g PRN    glucose chewable tablet 24 g PRN    [START ON 7/12/2025] heparin (porcine) injection 1,000 Units PRN    heparin (porcine) injection 2,000 Units PRN    hydrALAZINE tablet 10 mg Q12H    HYDROmorphone injection 0.5 mg Q6H PRN    insulin aspart U-100 pen 0-5 Units QID (AC + HS) PRN    isosorbide dinitrate tablet 5 mg BID    mupirocin 2 % ointment BID    oxyCODONE immediate release tablet 5 mg Q6H PRN    Peritoneal Dialysis Solution with Additives Once    Peritoneal Dialysis Solution with Additives Once    polyethylene glycol packet 17 g Daily    promethazine 12.5 mg in 0.9% NaCl 50 mL IVPB Q6H PRN    sacubitriL-valsartan 24-26 mg per tablet 1 tablet BID    sertraline tablet 100 mg Daily    sevelamer carbonate 2.4 gram powder 2.4 g TID WM    sodium bicarbonate  tablet 650 mg BID    sodium chloride 0.9% bolus 250 mL 250 mL PRN    sodium chloride 0.9% flush 10 mL PRN    trazodone split tablet 25 mg Nightly PRN       Objective:     Vital Signs (Most Recent):  Temp: 98 °F (36.7 °C) (07/11/25 0715)  Pulse: 84 (07/11/25 1130)  Resp: 15 (07/11/25 0715)  BP: 130/64 (07/11/25 1130)  SpO2: 99 % (07/11/25 0715) Vital Signs (24h Range):  Temp:  [97.9 °F (36.6 °C)-98.7 °F (37.1 °C)] 98 °F (36.7 °C)  Pulse:  [79-89] 84  Resp:  [10-18] 15  SpO2:  [97 %-100 %] 99 %  BP: ()/(50-71) 130/64     Weight: 64 kg (141 lb 1.5 oz) (07/04/25 1204)  Body mass index is 23.48 kg/m².  Body surface area is 1.71 meters squared.    I/O last 3 completed shifts:  In: 200 [P.O.:200]  Out: 450 [Urine:450]     Physical Exam  Vitals and nursing note reviewed.   Constitutional:       Appearance: Normal appearance. She is ill-appearing (chronically).   Eyes:      General: No scleral icterus.     Extraocular Movements: Extraocular movements intact.   Cardiovascular:      Rate and Rhythm: Normal rate and regular rhythm.      Pulses: Normal pulses.   Pulmonary:      Effort: Pulmonary effort is normal. No respiratory distress.   Musculoskeletal:         General: Deformity (tunneled HD cath; bilateral BKA) present. Normal range of motion.   Skin:     General: Skin is warm and dry.   Neurological:      General: No focal deficit present.      Mental Status: She is alert. Mental status is at baseline.   Psychiatric:         Mood and Affect: Mood normal.          Significant Labs:  All labs within the past 24 hours have been reviewed.     Significant Imaging:  Labs: Reviewed

## 2025-07-11 NOTE — ASSESSMENT & PLAN NOTE
Improving. Infectious vs uremic etiology    - Vancomycin/ceftazidine with PD during admission for possible peritonitis; discontinue on discharge  - On IV ertapenem for Klebsiella ESBL UTI  -Continue to monitor symptoms

## 2025-07-11 NOTE — ASSESSMENT & PLAN NOTE
Patient with 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 The patient's latest AMPAC (Activity Measure for Post Acute Care) Score is listed below.    AM-PAC Score - How much help does the patient need for each activity listed  Basic Mobility Total Score: 10  Turning over in bed (including adjusting bedclothes, sheets and blankets)?: A little  Sitting down on and standing up from a chair with arms (e.g., wheelchair, bedside commode, etc.): Unable  Moving from lying on back to sitting on the side of the bed?: Unable  Moving to and from a bed to a chair (including a wheelchair)?: A little  Need to walk in hospital room?: Unable  Climbing 3-5 steps with a railing?: Unable    Plan  - Progressive mobility protocol initated  - Fall precaution

## 2025-07-11 NOTE — PROGRESS NOTES
07/11/25 0600   Peritoneal Dialysis   Exchange Type Manual   Peritoneal Treatment Status Completed   Manual Peritoneal Dialysis   Manual Fill Volume (mL) 1000 mL   Manual Drain Volume (mL) 1000 mL   Effluent Appearance Yellow     Manual exchange completed after 8 hour dwell time. Able to drain 1000 mLs of clear yellow effluent without issues.

## 2025-07-12 VITALS
BODY MASS INDEX: 23.52 KG/M2 | OXYGEN SATURATION: 98 % | SYSTOLIC BLOOD PRESSURE: 141 MMHG | HEART RATE: 84 BPM | RESPIRATION RATE: 17 BRPM | TEMPERATURE: 98 F | WEIGHT: 141.13 LBS | HEIGHT: 65 IN | DIASTOLIC BLOOD PRESSURE: 81 MMHG

## 2025-07-12 LAB
BACTERIA FLD CULT: NORMAL
POCT GLUCOSE: 217 MG/DL (ref 70–110)
POCT GLUCOSE: 245 MG/DL (ref 70–110)
POCT GLUCOSE: 267 MG/DL (ref 70–110)

## 2025-07-12 PROCEDURE — 25000003 PHARM REV CODE 250: Mod: HCNC | Performed by: INTERNAL MEDICINE

## 2025-07-12 PROCEDURE — 63600175 PHARM REV CODE 636 W HCPCS: Mod: HCNC | Performed by: INTERNAL MEDICINE

## 2025-07-12 PROCEDURE — 63700000 PHARM REV CODE 250 ALT 637 W/O HCPCS: Mod: HCNC | Performed by: INTERNAL MEDICINE

## 2025-07-12 PROCEDURE — 25000003 PHARM REV CODE 250: Mod: HCNC | Performed by: STUDENT IN AN ORGANIZED HEALTH CARE EDUCATION/TRAINING PROGRAM

## 2025-07-12 RX ORDER — CARVEDILOL 6.25 MG/1
6.25 TABLET ORAL 2 TIMES DAILY
Qty: 180 TABLET | Refills: 0 | Status: SHIPPED | OUTPATIENT
Start: 2025-07-12 | End: 2025-07-25

## 2025-07-12 RX ORDER — POLYETHYLENE GLYCOL 3350 17 G/17G
17 POWDER, FOR SOLUTION ORAL DAILY
Qty: 1530 G | Refills: 0 | Status: SHIPPED | OUTPATIENT
Start: 2025-07-13 | End: 2025-07-25 | Stop reason: SDUPTHER

## 2025-07-12 RX ORDER — FAMOTIDINE 20 MG/1
20 TABLET, FILM COATED ORAL DAILY
Start: 2025-07-12

## 2025-07-12 RX ORDER — DICYCLOMINE HYDROCHLORIDE 10 MG/1
10 CAPSULE ORAL ONCE
Status: COMPLETED | OUTPATIENT
Start: 2025-07-12 | End: 2025-07-12

## 2025-07-12 RX ORDER — CINACALCET 30 MG/1
30 TABLET, FILM COATED ORAL
Start: 2025-07-14 | End: 2026-07-14

## 2025-07-12 RX ORDER — INSULIN GLARGINE 100 [IU]/ML
5 INJECTION, SOLUTION SUBCUTANEOUS NIGHTLY
Qty: 6 ML | Refills: 0 | Status: SHIPPED | OUTPATIENT
Start: 2025-07-12

## 2025-07-12 RX ORDER — PEN NEEDLE, DIABETIC 30 GX3/16"
NEEDLE, DISPOSABLE MISCELLANEOUS DAILY
Qty: 100 EACH | Refills: 0 | Status: SHIPPED | OUTPATIENT
Start: 2025-07-12

## 2025-07-12 RX ORDER — PEN NEEDLE, DIABETIC 33 GX5/32"
1 NEEDLE, DISPOSABLE MISCELLANEOUS
Start: 2025-07-12

## 2025-07-12 RX ADMIN — CLOPIDOGREL BISULFATE 75 MG: 75 TABLET, FILM COATED ORAL at 08:07

## 2025-07-12 RX ADMIN — SERTRALINE 100 MG: 100 TABLET, FILM COATED ORAL at 08:07

## 2025-07-12 RX ADMIN — DICYCLOMINE HYDROCHLORIDE 10 MG: 10 CAPSULE ORAL at 01:07

## 2025-07-12 RX ADMIN — ERYTHROMYCIN: 5 OINTMENT OPHTHALMIC at 01:07

## 2025-07-12 RX ADMIN — SODIUM BICARBONATE 650 MG TABLET 650 MG: at 08:07

## 2025-07-12 RX ADMIN — MUPIROCIN: 20 OINTMENT TOPICAL at 09:07

## 2025-07-12 RX ADMIN — ATORVASTATIN CALCIUM 80 MG: 40 TABLET, FILM COATED ORAL at 08:07

## 2025-07-12 RX ADMIN — FUROSEMIDE 40 MG: 40 TABLET ORAL at 08:07

## 2025-07-12 RX ADMIN — APIXABAN 5 MG: 5 TABLET, FILM COATED ORAL at 08:07

## 2025-07-12 RX ADMIN — SEVELAMER CARBONATE 2.4 G: 2400 POWDER, FOR SUSPENSION ORAL at 09:07

## 2025-07-12 RX ADMIN — FLUCONAZOLE 100 MG: 100 TABLET ORAL at 08:07

## 2025-07-12 RX ADMIN — ERYTHROMYCIN: 5 OINTMENT OPHTHALMIC at 05:07

## 2025-07-12 RX ADMIN — FAMOTIDINE 20 MG: 20 TABLET, FILM COATED ORAL at 08:07

## 2025-07-12 RX ADMIN — SEVELAMER CARBONATE 2.4 G: 2400 POWDER, FOR SUSPENSION ORAL at 01:07

## 2025-07-12 RX ADMIN — ERTAPENEM 500 MG: 1 INJECTION INTRAMUSCULAR; INTRAVENOUS at 10:07

## 2025-07-12 RX ADMIN — ISOSORBIDE DINITRATE 5 MG: 5 TABLET ORAL at 08:07

## 2025-07-12 RX ADMIN — CARVEDILOL 6.25 MG: 6.25 TABLET, FILM COATED ORAL at 08:07

## 2025-07-12 RX ADMIN — SACUBITRIL AND VALSARTAN 1 TABLET: 24; 26 TABLET, FILM COATED ORAL at 08:07

## 2025-07-12 RX ADMIN — INSULIN ASPART 2 UNITS: 100 INJECTION, SOLUTION INTRAVENOUS; SUBCUTANEOUS at 09:07

## 2025-07-12 RX ADMIN — INSULIN ASPART 2 UNITS: 100 INJECTION, SOLUTION INTRAVENOUS; SUBCUTANEOUS at 01:07

## 2025-07-12 RX ADMIN — ALLOPURINOL 100 MG: 100 TABLET ORAL at 08:07

## 2025-07-12 RX ADMIN — HYDRALAZINE HYDROCHLORIDE 10 MG: 10 TABLET, FILM COATED ORAL at 08:07

## 2025-07-12 RX ADMIN — POLYETHYLENE GLYCOL 3350 17 G: 17 POWDER, FOR SOLUTION ORAL at 09:07

## 2025-07-12 NOTE — ASSESSMENT & PLAN NOTE
>>ASSESSMENT AND PLAN FOR S/P CABG X 1 WRITTEN ON 7/11/2025  8:50 PM BY HAND, ALYSIA BLANCA MD    stable

## 2025-07-12 NOTE — PROGRESS NOTES
Discharge instructions given to pt and copy sent with ambulance to give to family.  PIV to left arm removed with catheter intact and dressing applied.  No bleeding noted.  All other lines secure and dressed.  Pt placed on stretcher without incident and transported off unit.  Pt in good condition.

## 2025-07-12 NOTE — ASSESSMENT & PLAN NOTE
Anemia is likely due to chronic disease due to ESRD. Most recent hemoglobin and hematocrit are listed below.  Recent Labs     07/09/25  0755 07/11/25  0752   HGB 10.0* 9.9*   HCT 32.3* 32.6*       Plan  - Monitor serial CBC: Daily  - Transfuse PRBC if patient becomes hemodynamically unstable, symptomatic or H/H drops below 7/21.  - Patient has not received any PRBC transfusions to date  - Patient's anemia is currently stable

## 2025-07-12 NOTE — PROGRESS NOTES
[facility] Medicine  Progress Note    Patient Name: Suyapa Connelly  MRN: [id number]  Patient Class: IP- Inpatient   Admission Date: 7/3/2025  Length of Stay: 6 days  Attending Physician: Timmy Hu MD  Primary Care Provider: Vanessa Noel MD        Subjective     Principal Problem:Hyperkalemia        HPI:  58 yo F with ESRD, previously on thrice-weekly HD, now transitioning to home PD and currently receiving once weekly HD, last session 1 week ago, presenting to ED with dysuria, weakness, HA, N/V. K 7.0. EKG ordered, pending. Calcium, insulin, D50, albuterol, Lokelma ordered for medical management.      reports she is more somnolent, CT of head is pending.    Overview/Hospital Course:  Found to have ESBL klebsiella UTI, ID consulted    7/09 Will need Ertapenem (renal dose) x 5 days (EOC:7/12/25).     Interval History: NAEON. No acute issues. Currently on ertapenem.    Review of Systems  Objective:     Vital Signs (Most Recent):  Temp: 98.2 °F (36.8 °C) (07/11/25 2009)  Pulse: 88 (07/11/25 2009)  Resp: 15 (07/11/25 2009)  BP: (!) 161/69 (07/11/25 2009)  SpO2: 97 % (07/11/25 2009) Vital Signs (24h Range):  Temp:  [97.9 °F (36.6 °C)-99.2 °F (37.3 °C)] 98.2 °F (36.8 °C)  Pulse:  [77-88] 88  Resp:  [10-18] 15  SpO2:  [97 %-100 %] 97 %  BP: (121-161)/(55-88) 161/69     Weight: 64 kg (141 lb 1.5 oz)  Body mass index is 23.48 kg/m².    Intake/Output Summary (Last 24 hours) at 7/11/2025 2049  Last data filed at 7/11/2025 1733  Gross per 24 hour   Intake 600 ml   Output 3200 ml   Net -2600 ml         Physical Exam  Constitutional:       General: She is not in acute distress.     Appearance: She is ill-appearing.   HENT:      Head: Normocephalic.      Nose: Nose normal.      Mouth/Throat:      Mouth: Mucous membranes are moist.   Cardiovascular:      Rate and Rhythm: Normal rate.   Pulmonary:      Effort: Pulmonary effort is normal.   Abdominal:      General: Abdomen is  flat.   Musculoskeletal:         General: Normal range of motion.      Cervical back: Normal range of motion.   Skin:     General: Skin is warm.   Neurological:      General: No focal deficit present.               Significant Labs: All pertinent labs within the past 24 hours have been reviewed.  BMP:   Recent Labs   Lab 07/11/25  0752   *      K 4.1      CO2 22*   BUN 16   CREATININE 4.5*   CALCIUM 7.5*       Significant Imaging: I have reviewed all pertinent imaging results/findings within the past 24 hours.      Assessment & Plan  Hyperkalemia  Hyperkalemia is likely due to ESRD.The patients most recent potassium results are listed below.  Recent Labs     07/09/25  0755 07/11/25  0752   K 3.7 4.1     Plan  - Monitor for arrhythmias with EKG and/or continuous telemetry.   - Treat the hyperkalemia with Potassium Binders.   - Monitor potassium: daily  - The patient's hyperkalemia is improved following urgent HD   - Nephrology consulted  S/P CABG x 1  stable    Anemia due to chronic kidney disease, on chronic dialysis  Anemia is likely due to chronic disease due to ESRD. Most recent hemoglobin and hematocrit are listed below.  Recent Labs     07/09/25  0755 07/11/25  0752   HGB 10.0* 9.9*   HCT 32.3* 32.6*       Plan  - Monitor serial CBC: Daily  - Transfuse PRBC if patient becomes hemodynamically unstable, symptomatic or H/H drops below 7/21.  - Patient has not received any PRBC transfusions to date  - Patient's anemia is currently stable    Paroxysmal atrial fibrillation  Patient has persistent (7 days or more) atrial fibrillation. Patient is currently in sinus rhythm. [id number] Score: 3. The patients heart rate in the last 24 hours is as follows:  Pulse  Min: 77  Max: 88     Antiarrhythmics       Anticoagulants  apixaban tablet 5 mg, 2 times daily, Oral  heparin (porcine) injection 1,000 Units, As needed (PRN), Intra-Catheter  heparin (porcine) injection 2,000 Units, As needed (PRN),  Intra-Catheter    Plan  - Replete lytes with a goal of K>4, Mg >2  - Patient is anticoagulated, see medications listed above.  - Patient's afib is currently controlled    Encephalopathy  Etiology UTI  CTH: Sulcal crowding at the apex with vague sulcal hyperattenuation, suggestive of pseudosubarachnoid hemorrhage, favoring mild cerebral edema.     MRI: No evidence for acute infarction   Mental status back to baseline    ESRD (end stage renal disease)  Creatine stable for now. BMP reviewed- noted Estimated Creatinine Clearance: 12.3 mL/min (A) (based on SCr of 4.5 mg/dL (H)). according to latest data. Based on current GFR, CKD stage is end stage.  Monitor UOP and serial BMP and adjust therapy as needed. Renally dose meds. Avoid nephrotoxic medications and procedures.  Planning for PD with weekly HD. CM on board   Moderate malnutrition  Nutrition consulted. Most recent weight and BMI monitored-     Measurements:  Wt Readings from Last 1 Encounters:   07/04/25 64 kg (141 lb 1.5 oz)   Body mass index is 23.48 kg/m².    Patient has been screened and assessed by RD.    Malnutrition Type:  Context: chronic illness  Level: moderate    Malnutrition Characteristic Summary:  Weight Loss (Malnutrition): 20% in 1 year  Energy Intake (Malnutrition): less than 75% for greater than 7 days  Subcutaneous Fat (Malnutrition): mild depletion  Muscle Mass (Malnutrition): mild depletion  Fluid Accumulation (Malnutrition): moderate    Interventions/Recommendations (treatment strategy):  1.)    Urinary tract infection due to ESBL Klebsiella  5 Days of ertapenem, End date 7/012  ID consulted    Debility  Patient with 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 The patient's latest AMPAC (Activity Measure for Post Acute Care) Score is listed below.    AM-PAC Score - How much help does the patient need for each activity listed  Basic Mobility Total Score: 10  Turning over in bed (including adjusting  bedclothes, sheets and blankets)?: A little  Sitting down on and standing up from a chair with arms (e.g., wheelchair, bedside commode, etc.): Unable  Moving from lying on back to sitting on the side of the bed?: Unable  Moving to and from a bed to a chair (including a wheelchair)?: A little  Need to walk in hospital room?: Unable  Climbing 3-5 steps with a railing?: Unable    Plan  - Progressive mobility protocol initated  - Fall precaution      VTE Risk Mitigation (From admission, onward)           Ordered     heparin (porcine) injection 1,000 Units  As needed (PRN)         07/11/25 0851     heparin (porcine) injection 2,000 Units  As needed (PRN)         07/11/25 1013     apixaban tablet 5 mg  2 times daily         07/10/25 1039     heparin (porcine) injection 1,000 Units  As needed (PRN)         07/09/25 0933     heparin (porcine) injection 1,000 Units  As needed (PRN)         07/07/25 1002     IP VTE HIGH RISK PATIENT  Once         07/03/25 1620     Place sequential compression device  Until discontinued         07/03/25 1620     heparin (porcine) injection 1,000 Units  As needed (PRN)         07/03/25 1500                    Discharge Planning   DEVAN: 7/11/2025     Code Status: Full Code   Medical Readiness for Discharge Date:   Discharge Plan A: Home with family                        Timmy Hu MD  Department of Hospital Medicine   [facility] - Acute Medical Stepdown

## 2025-07-12 NOTE — PLAN OF CARE
07/12/25 1135   Medicare Message   Important Message from Medicare regarding Discharge Appeal Rights Given to patient/caregiver;Explained to patient/caregiver;Signed/date by patient/caregiver   Date IMM was signed 07/12/25   Time IMM was signed 1135     IMM explained and discussed with spouse, Randell Connelly, via phone.  Verbalized understanding.    Kylah Mujica RN, BSN  Case Management  [phone]

## 2025-07-12 NOTE — ASSESSMENT & PLAN NOTE
Creatine stable for now. BMP reviewed- noted Estimated Creatinine Clearance: 12.3 mL/min (A) (based on SCr of 4.5 mg/dL (H)). according to latest data. Based on current GFR, CKD stage is end stage.  Monitor UOP and serial BMP and adjust therapy as needed. Renally dose meds. Avoid nephrotoxic medications and procedures.  Planning for PD with weekly HD. CM on board

## 2025-07-12 NOTE — PLAN OF CARE
[facility]  Discharge Final Note    Primary Care Provider: Vanessa Noel MD    Expected Discharge Date: 7/12/2025    Patient discharged home via [facility] transportation.     Patient's bedside nurse and spouse, Randell Velasco,  notified of the above.    Discharge Plan A and Plan B have been determined by review of patient's clinical status, future medical and therapeutic needs, and coverage/benefits for post-acute care in coordination with multidisciplinary team members.        Final Discharge Note (most recent)       Final Note - 07/12/25 1515          Final Note    Assessment Type Final Discharge Note (P)      Anticipated Discharge Disposition Home or Self Care (P)      What phone number can be called within the next 1-3 days to see how you are doing after discharge? [phone] (P)         Post-Acute Status    Discharge Delays None known at this time (P)                      Important Message from Medicare  Important Message from Medicare regarding Discharge Appeal Rights: Given to patient/caregiver, Explained to patient/caregiver, Signed/date by patient/caregiver     Date IMM was signed: 07/12/25  Time IMM was signed: 1135        Future Appointments   Date Time Provider Department Center   [facility]14[facility]2025  [facility]   7/21/2025  3:00 PM Bonita Malin, NP [facility]   8/8/2025  8:30 AM Megan Grewal OD [facility]       Spoke with spouse via phone to notify of patient discharge home.  Transportation setup via [facility] with [facility].    Kylah Mujica RN, BSN  Case Management  [phone]

## 2025-07-12 NOTE — ASSESSMENT & PLAN NOTE
Patient has persistent (7 days or more) atrial fibrillation. Patient is currently in sinus rhythm. [id number] Score: 3. The patients heart rate in the last 24 hours is as follows:  Pulse  Min: 77  Max: 88     Antiarrhythmics       Anticoagulants  apixaban tablet 5 mg, 2 times daily, Oral  heparin (porcine) injection 1,000 Units, As needed (PRN), Intra-Catheter  heparin (porcine) injection 2,000 Units, As needed (PRN), Intra-Catheter    Plan  - Replete lytes with a goal of K>4, Mg >2  - Patient is anticoagulated, see medications listed above.  - Patient's afib is currently controlled

## 2025-07-12 NOTE — PROGRESS NOTES
07/11/25 2140   Vitals   Pulse 87   Resp 14   SpO2 98 %   Peritoneal Dialysis   Exchange Type Manual   Peritoneal Treatment Status Started   Dianeal Solution Dextrose 1.5% in 2000 mL   Dianeal Antibiotic   (ceftaz and vanc)   Manual Peritoneal Dialysis   Manual Fill Volume (mL) 1000 mL   Manual Treatment Comments Manual exchange with antibiotic started     Manual exchange with antibiotic started. Filled with 1000mL Dextrose 1.5% with ceftaz and vac to dwell for 8 hours. No inflow issues noted. Report given to primary nurse.

## 2025-07-12 NOTE — PLAN OF CARE
AXO (x4); VSS; Hyperkalemia resolved; ESRD/CKD/Renal Failure- HD combined with PD [ last HD- 7/11/25= 2.5 L fluid removed; last PD-early am 7/12/25 = 700ml removed]; C/o severe 10/10 cramping- Bentyl given per MD order. Safety measures in place. No acute events this shift.    Problem: Skin Injury Risk Increased  Goal: Skin Health and Integrity  Outcome: Progressing     Problem: Hemodialysis  Goal: Safe, Effective Therapy Delivery  Outcome: Progressing  Goal: Effective Tissue Perfusion  Outcome: Progressing  Goal: Absence of Infection Signs and Symptoms  Outcome: Progressing     Problem: Adult Inpatient Plan of Care  Goal: Plan of Care Review  Outcome: Progressing  Goal: Patient-Specific Goal (Individualized)  Outcome: Progressing  Goal: Absence of Hospital-Acquired Illness or Injury  Outcome: Progressing  Goal: Optimal Comfort and Wellbeing  Outcome: Progressing  Goal: Readiness for Transition of Care  Outcome: Progressing     Problem: Diabetes Comorbidity  Goal: Blood Glucose Level Within Targeted Range  Outcome: Progressing     Problem: Sepsis/Septic Shock  Goal: Optimal Coping  Outcome: Progressing  Goal: Absence of Bleeding  Outcome: Progressing  Goal: Blood Glucose Level Within Targeted Range  Outcome: Progressing  Goal: Absence of Infection Signs and Symptoms  Outcome: Progressing  Goal: Optimal Nutrition Intake  Outcome: Progressing     Problem: Infection  Goal: Absence of Infection Signs and Symptoms  Outcome: Progressing     Problem: Fall Injury Risk  Goal: Absence of Fall and Fall-Related Injury  Outcome: Progressing     Problem: Wound  Goal: Optimal Coping  Outcome: Progressing  Goal: Optimal Functional Ability  Outcome: Progressing  Goal: Absence of Infection Signs and Symptoms  Outcome: Progressing  Goal: Improved Oral Intake  Outcome: Progressing  Goal: Optimal Pain Control and Function  Outcome: Progressing  Goal: Skin Health and Integrity  Outcome: Progressing  Goal: Optimal Wound Healing  Outcome:  Progressing

## 2025-07-12 NOTE — ASSESSMENT & PLAN NOTE
Hyperkalemia is likely due to ESRD.The patients most recent potassium results are listed below.  Recent Labs     07/09/25  0755 07/11/25  0752   K 3.7 4.1     Plan  - Monitor for arrhythmias with EKG and/or continuous telemetry.   - Treat the hyperkalemia with Potassium Binders.   - Monitor potassium: daily  - The patient's hyperkalemia is improved following urgent HD   - Nephrology consulted

## 2025-07-12 NOTE — PLAN OF CARE
CHW met with patient/family at bedside. Patient experience rounding completed and reviewed the following.    Do you know your discharge plan? Yes or No  If yes, what is the plan? (Home, Home Health, Rehab, SNF, LTAC, or Other)  Yes Home    Have you dicussed your needs and preferences with your SW/CM? Yes or No Yes Home     If you are discharging home do you have help at home? Yes or No Yes (spouse)    Do you think you will need additional help at home at discharge? Yes or No  No    Do you currently have difficulty keeping up with bills, affording medicine or buying food? Yes or No No    Assigned SW/Cm notified of any patient/family needs or concerns. Appropriate resources provided to address patient needs. Kylah Wells, PARDEEP, RSW, BSW  Case Management  [phone]

## 2025-07-12 NOTE — PROGRESS NOTES
07/12/25 0145   Peritoneal Dialysis   Exchange Type Manual   Peritoneal Treatment Status Completed   Manual Peritoneal Dialysis   Manual Drain Volume (mL) 700 mL   Effluent Appearance Yellow   Manual Treatment Comments Manual exchange drain aseptically     0110H Informed by primary nurse regarding pt's complain of severe stomach cramping and concerns about indwelling PD solution may be causing discomfort. PRN pain meds given by primary nurse. Dr. Jacobs informed regarding concerns of cramping. Per Dr. Jacobs okay to drain earlier and will reassess in the morning.     0145 Manual exchange completed. Drained 700mLs of clear yellow effluent. Betadine cap applied and PD catheter secured to abdomen with paper tape. Patient reported minimal relief upon completion of PD.

## 2025-07-12 NOTE — SUBJECTIVE & OBJECTIVE
Interval History: NAEON. No acute issues. Currently on ertapenem.    Review of Systems  Objective:     Vital Signs (Most Recent):  Temp: 98.2 °F (36.8 °C) (07/11/25 2009)  Pulse: 88 (07/11/25 2009)  Resp: 15 (07/11/25 2009)  BP: (!) 161/69 (07/11/25 2009)  SpO2: 97 % (07/11/25 2009) Vital Signs (24h Range):  Temp:  [97.9 °F (36.6 °C)-99.2 °F (37.3 °C)] 98.2 °F (36.8 °C)  Pulse:  [77-88] 88  Resp:  [10-18] 15  SpO2:  [97 %-100 %] 97 %  BP: (121-161)/(55-88) 161/69     Weight: 64 kg (141 lb 1.5 oz)  Body mass index is 23.48 kg/m².    Intake/Output Summary (Last 24 hours) at 7/11/2025 2049  Last data filed at 7/11/2025 1733  Gross per 24 hour   Intake 600 ml   Output 3200 ml   Net -2600 ml         Physical Exam  Constitutional:       General: She is not in acute distress.     Appearance: She is ill-appearing.   HENT:      Head: Normocephalic.      Nose: Nose normal.      Mouth/Throat:      Mouth: Mucous membranes are moist.   Cardiovascular:      Rate and Rhythm: Normal rate.   Pulmonary:      Effort: Pulmonary effort is normal.   Abdominal:      General: Abdomen is flat.   Musculoskeletal:         General: Normal range of motion.      Cervical back: Normal range of motion.   Skin:     General: Skin is warm.   Neurological:      General: No focal deficit present.               Significant Labs: All pertinent labs within the past 24 hours have been reviewed.  BMP:   Recent Labs   Lab 07/11/25  0752   *      K 4.1      CO2 22*   BUN 16   CREATININE 4.5*   CALCIUM 7.5*       Significant Imaging: I have reviewed all pertinent imaging results/findings within the past 24 hours.

## 2025-07-15 ENCOUNTER — PATIENT OUTREACH (OUTPATIENT)
Dept: ADMINISTRATIVE | Facility: CLINIC | Age: 60
End: 2025-07-15
Payer: MEDICARE

## 2025-07-15 NOTE — PROGRESS NOTES
C3 nurse attempted to contact Suyapa Connelly  for a TCC post hospital discharge follow up call. No answer.   The patient does not have a scheduled HOSFU appointment. Message sent to PCP's staff to assist with HOSFU appointment scheduling.

## 2025-07-16 NOTE — ASSESSMENT & PLAN NOTE
"Hyperkalemia is likely due to ESRD.The patients most recent potassium results are listed below.  No results for input(s): "K" in the last 72 hours.    Plan  - Monitor for arrhythmias with EKG and/or continuous telemetry.   - Treat the hyperkalemia with Potassium Binders.   - Monitor potassium: daily  - The patient's hyperkalemia is improved following urgent HD   - Nephrology consulted  "

## 2025-07-16 NOTE — DISCHARGE SUMMARY
[facility] - [facility] Medicine  Discharge Summary      Patient Name: Suyapa Connelly  MRN: [id number]  CHAVEZ: [id number]  Patient Class: IP- Inpatient  Admission Date: 7/3/2025  Hospital Length of Stay: 7 days  Discharge Date and Time: 7/12/2025  2:23 PM  Attending Physician: No att. providers found   Discharging Provider: Timmy Hu MD  Primary Care Provider: Vanessa Noel MD  Hospital Medicine Team: [facility] MED Q Timmy Hu MD  Primary Care Team: [facility] MED Q    HPI:   58 yo F with ESRD, previously on thrice-weekly HD, now transitioning to home PD and currently receiving once weekly HD, last session 1 week ago, presenting to ED with dysuria, weakness, HA, N/V. K 7.0. EKG ordered, pending. Calcium, insulin, D50, albuterol, Lokelma ordered for medical management.      reports she is more somnolent, CT of head is pending.    * No surgery found *      Hospital Course:   See individual problem list.     Goals of Care Treatment Preferences:  Code Status: Full Code         Consults:   Consults (From admission, onward)          Status Ordering Provider     Inpatient consult to Infectious Diseases  Once        Provider:  (Not yet assigned)    Completed SUHAS ALVAREZ     Inpatient consult to Midline team  Once        Provider:  (Not yet assigned)    Completed SUHAS ALVAREZ     Inpatient consult to Registered Dietitian/Nutritionist  Once        Provider:  (Not yet assigned)    Completed SUHAS ALVAREZ     Inpatient consult to Nephrology  Once        Provider:  (Not yet assigned)    Completed SUHAS ALVAREZ            Physical Exam  Constitutional:       General: She is not in acute distress.     Appearance: She is ill-appearing.   HENT:      Head: Normocephalic.      Nose: Nose normal.      Mouth/Throat:      Mouth: Mucous membranes are moist.   Cardiovascular:      Rate and Rhythm: Normal rate.   Pulmonary:      Effort: Pulmonary effort is normal.   Abdominal:       "General: Abdomen is flat.   Musculoskeletal:         General: Normal range of motion.      Cervical back: Normal range of motion.   Skin:     General: Skin is warm.   Neurological:      General: No focal deficit present.      Assessment & Plan  Hyperkalemia  Hyperkalemia is likely due to ESRD.The patients most recent potassium results are listed below.  No results for input(s): "K" in the last 72 hours.    Plan  - Monitor for arrhythmias with EKG and/or continuous telemetry.   - Treat the hyperkalemia with Potassium Binders.   - Monitor potassium: daily  - The patient's hyperkalemia is improved following urgent HD   - Nephrology consulted  S/P CABG x 1  stable    Anemia due to chronic kidney disease, on chronic dialysis  Anemia is likely due to chronic disease due to ESRD. Most recent hemoglobin and hematocrit are listed below.  No results for input(s): "HGB", "HCT" in the last 72 hours.      Plan  - Monitor serial CBC: Daily  - Transfuse PRBC if patient becomes hemodynamically unstable, symptomatic or H/H drops below 7/21.  - Patient has not received any PRBC transfusions to date  - Patient's anemia is currently stable    Paroxysmal atrial fibrillation  Patient has persistent (7 days or more) atrial fibrillation. Patient is currently in sinus rhythm. [id number] Score: 3. The patients heart rate in the last 24 hours is as follows:  No data recorded     Antiarrhythmics       Anticoagulants  apixaban tablet, 2 times daily, Oral    Plan  - Replete lytes with a goal of K>4, Mg >2  - Patient is anticoagulated, see medications listed above.  - Patient's afib is currently controlled    Encephalopathy  Etiology UTI  CTH: Sulcal crowding at the apex with vague sulcal hyperattenuation, suggestive of pseudosubarachnoid hemorrhage, favoring mild cerebral edema.     MRI: No evidence for acute infarction   Mental status back to baseline    ESRD (end stage renal disease)  Creatine stable for now. BMP reviewed- noted Estimated " Creatinine Clearance: 12.3 mL/min (A) (based on SCr of 4.5 mg/dL (H)). according to latest data. Based on current GFR, CKD stage is end stage.  Monitor UOP and serial BMP and adjust therapy as needed. Renally dose meds. Avoid nephrotoxic medications and procedures.  Planning for PD with weekly HD. CM on board   Moderate malnutrition  Nutrition consulted. Most recent weight and BMI monitored-     Measurements:  Wt Readings from Last 1 Encounters:   07/04/25 64 kg (141 lb 1.5 oz)   Body mass index is 23.48 kg/m².    Patient has been screened and assessed by RD.    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 The patient's latest AMPAC (Activity Measure for Post Acute Care) Score is listed below.    AM-PAC Score - How much help does the patient need for each activity listed  Basic Mobility Total Score: 10  Turning over in bed (including adjusting bedclothes, sheets and blankets)?: A little  Sitting down on and standing up from a chair with arms (e.g., wheelchair, bedside commode, etc.): Unable  Moving from lying on back to sitting on the side of the bed?: Unable  Moving to and from a bed to a chair (including a wheelchair)?: A little  Need to walk in hospital room?: Unable  Climbing 3-5 steps with a railing?: Unable    Plan  - Progressive mobility protocol initated  - Fall precaution      Final Active Diagnoses:     Diagnosis Date Noted POA    PRINCIPAL PROBLEM:  Hyperkalemia [E87.5] 03/26/2022 Yes    Moderate malnutrition [E44.0] 07/07/2025 Yes    ESRD (end stage renal disease) [N18.6] 04/21/2023 Yes    Urinary tract infection due to ESBL Klebsiella [N39.0, B96.89] 03/15/2022 Yes    Debility [R53.81] 02/12/2022 Yes    Encephalopathy [G93.40] 05/20/2021 Yes    Paroxysmal atrial fibrillation [I48.0] 01/28/2020 Yes    Anemia due to chronic kidney disease, on chronic dialysis [N18.6, D63.1, Z99.2] 01/27/2020 Not Applicable    S/P CABG x 1 [Z95.1] 01/27/2020 Not Applicable      Problems Resolved During this Admission:       Discharged Condition: good    Disposition: Home or Self Care    Follow Up:    Patient Instructions:      Diet renal     Notify your health care provider if you experience any of the following:  temperature >100.4     Notify your health care provider if you experience any of the following:  persistent nausea and vomiting or diarrhea     Notify your health care provider if you experience any of the following:  severe uncontrolled pain     Notify your health care provider if you experience any of the following:  difficulty breathing or increased cough     Notify your health care provider if you experience any of the following:  severe persistent headache     Notify your health care provider if you experience any of the following:  persistent dizziness, light-headedness, or visual disturbances     Notify your health care provider if you experience any of the following:  increased confusion or weakness     Activity as tolerated       Significant Diagnostic Studies: Labs: All labs within the past 24 hours have been reviewed    Pending Diagnostic Studies:       None           Medications:  Reconciled Home Medications:      Medication List        PAUSE taking these medications      calcitRIOL 0.5 MCG Cap  Wait to take this until your doctor or other care provider tells you to start again.  Commonly known as:  "ROCALTROL  Take 1 capsule (0.5 mcg total) by mouth once daily.  Ask about: Should I take this medication?     calcium acetate(phosphat bind) 667 mg tablet  Wait to take this until your doctor or other care provider tells you to start again.  Commonly known as: PHOSLO  Take 1 tablet (667 mg total) by mouth 2 (two) times a day.     pregabalin 75 MG capsule  Wait to take this until your doctor or other care provider tells you to start again.  Commonly known as: LYRICA  Take 1 capsule (75 mg total) by mouth Daily.            START taking these medications      polyethylene glycol 17 gram/dose powder  Commonly known as: GLYCOLAX  Mix 1 capful (17 g) with a liquid and take by mouth once daily.            CHANGE how you take these medications      apixaban 5 mg Tab  Commonly known as: ELIQUIS  Take 1 tablet (5 mg total) by mouth 2 (two) times daily.  What changed:   medication strength  how much to take     famotidine 20 MG tablet  Commonly known as: PEPCID  Take 1 tablet (20 mg total) by mouth once daily.  What changed: when to take this     * pen needle, diabetic 32 gauge x 5/32" Ndle  Commonly known as: BD MIGUEL 2ND GEN PEN NEEDLE  Use to inject insulin once daily  What changed: when to take this     * pen needle, diabetic 33 gauge x 5/32" Ndle  1 each by Misc.(Non-Drug; Combo Route) route 4 (four) times daily before meals and nightly.  What changed: Another medication with the same name was changed. Make sure you understand how and when to take each.           * This list has 2 medication(s) that are the same as other medications prescribed for you. Read the directions carefully, and ask your doctor or other care provider to review them with you.                CONTINUE taking these medications      acetaminophen 325 MG tablet  Commonly known as: TYLENOL  Take 2 tablets (650 mg total) by mouth every 8 (eight) hours as needed for Pain.     allopurinoL 100 MG tablet  Commonly known as: ZYLOPRIM  TAKE 1/2 TABLET(50 MG) BY " MOUTH EVERY OTHER DAY     atorvastatin 80 MG tablet  Commonly known as: LIPITOR  Take 1 tablet (80 mg total) by mouth once daily.     blood sugar diagnostic Strp  1 each by Misc.(Non-Drug; Combo Route) route 4 (four) times daily before meals and nightly.     * blood-glucose meter Misc  Before meals and at bedtime     * blood-glucose meter Misc  1 each by Misc.(Non-Drug; Combo Route) route 3 (three) times daily.     carvediloL 6.25 MG tablet  Commonly known as: COREG  Take 1 tablet (6.25 mg total) by mouth 2 (two) times daily.     cinacalcet 30 MG Tab  Commonly known as: SENSIPAR  Take 1 tablet (30 mg total) by mouth every Mon, Wed, Fri.     clopidogreL 75 mg tablet  Commonly known as: PLAVIX  Take 1 tablet (75 mg total) by mouth once daily.     DEXCOM G7 SENSOR Radha  Generic drug: blood-glucose sensor  Apply device and replace every 10 days     furosemide 40 MG tablet  Commonly known as: LASIX  Take 1 tablet (40 mg total) by mouth 2 (two) times daily.     hydrALAZINE 10 MG tablet  Commonly known as: APRESOLINE  Take 1 tablet (10 mg total) by mouth every 12 (twelve) hours.     * insulin aspart U-100 100 unit/mL (3 mL) Inpn pen  Commonly known as: NovoLOG Flexpen U-100 Insulin  Inject 3 Units into the skin 3 (three) times daily with meals.     * insulin aspart U-100 100 unit/mL (3 mL) Inpn pen  Commonly known as: NovoLOG  Inject 5 Units into the skin 3 times daily with meals. **MODERATE CORRECTION DOSE**  151-200 mg/dL Pre-meal: 2 units 2200: 1 unit; 201-250 mg/dL Pre-meal 4 units 2200: 2 units; 251-300mg/dL Pre-meal 6 units 2200: 3 units; 301-350mg/dL Pre-meal 8 units 2200: 4 units; >350mg/dL Pre-meal 10 U 2200: 5 U     insulin glargine U-100 (Lantus) 100 unit/mL (3 mL) Inpn pen  Inject 5 Units into the skin every evening.     isosorbide dinitrate 10 MG tablet  Commonly known as: ISORDIL  Take 0.5 tablets (5 mg total) by mouth 2 (two) times daily.     lancets 33 gauge Misc  TEST 3 TIMES DAILY     mupirocin 2 %  ointment  Commonly known as: BACTROBAN  Apply topically 2 (two) times daily.     ONE DAILY WOMENS 50 PLUS 0.4 mg Tab  Generic drug: multivit with min-folic acid  Take 1 tablet by mouth once daily.     OZEMPIC 0.25 mg or 0.5 mg (2 mg/3 mL) pen injector  Generic drug: semaglutide  Inject 0.5 mg into the skin every 7 days.     RENVELA 2.4 gram Pwpk  Generic drug: sevelamer carbonate  Take 1 packet by mouth 3 (three) times daily with meals.     sacubitriL-valsartan 24-26 mg per tablet  Commonly known as: ENTRESTO  Take 1 tablet by mouth 2 (two) times daily.     senna-docusate 8.6-50 mg per tablet  Commonly known as: PERICOLACE  Take 1 tablet by mouth 2 (two) times daily.     sertraline 100 MG tablet  Commonly known as: ZOLOFT  Take 1 tablet (100 mg total) by mouth once daily.     sodium bicarbonate 650 MG tablet  TAKE 1 TABLET(650 MG) BY MOUTH THREE TIMES DAILY     traZODone 50 MG tablet  Commonly known as: DESYREL  TAKE 1/2 TABLET(25 MG) BY MOUTH EVERY EVENING     zinc oxide 10 % Oint  Apply 1 Application topically 2 (two) times a day.           * This list has 4 medication(s) that are the same as other medications prescribed for you. Read the directions carefully, and ask your doctor or other care provider to review them with you.                STOP taking these medications      HYDROcodone-acetaminophen 5-325 mg per tablet  Commonly known as: NORCO              Indwelling Lines/Drains at time of discharge:   Lines/Drains/Airways       Central Venous Catheter Line  Duration                  Hemodialysis Catheter left subclavian -- days         Hemodialysis Catheter left subclavian -- days              Drain  Duration                  Open Drain 06/03/25 1144 Tube - 1 Medial;Superior Abdomen Other (see comments) Other (see comments) 42 days                        Time spent on the discharge of patient: 35 minutes         Timmy Hu MD  Department of Hospital Medicine  [facility] Stepdown

## 2025-07-16 NOTE — ASSESSMENT & PLAN NOTE
>>ASSESSMENT AND PLAN FOR S/P CABG X 1 WRITTEN ON 7/15/2025  9:21 PM BY HAND, ALYSIA BLANCA MD    stable

## 2025-07-16 NOTE — ASSESSMENT & PLAN NOTE
Patient has persistent (7 days or more) atrial fibrillation. Patient is currently in sinus rhythm. [id number] Score: 3. The patients heart rate in the last 24 hours is as follows:  No data recorded     Antiarrhythmics       Anticoagulants  apixaban tablet, 2 times daily, Oral    Plan  - Replete lytes with a goal of K>4, Mg >2  - Patient is anticoagulated, see medications listed above.  - Patient's afib is currently controlled

## 2025-07-18 ENCOUNTER — HOSPITAL ENCOUNTER (INPATIENT)
Facility: HOSPITAL | Age: 60
LOS: 2 days | Discharge: HOME | DRG: 312 | End: 2025-07-21
Attending: EMERGENCY MEDICINE | Admitting: HOSPITALIST
Payer: MEDICARE

## 2025-07-18 DIAGNOSIS — I95.9 HYPOTENSION, UNSPECIFIED HYPOTENSION TYPE: ICD-10-CM

## 2025-07-18 DIAGNOSIS — G93.40 ENCEPHALOPATHY: ICD-10-CM

## 2025-07-18 DIAGNOSIS — R07.9 CHEST PAIN: ICD-10-CM

## 2025-07-18 DIAGNOSIS — R39.9 UTI SYMPTOMS: ICD-10-CM

## 2025-07-18 DIAGNOSIS — Z99.2 ESRD (END STAGE RENAL DISEASE) ON DIALYSIS: Primary | ICD-10-CM

## 2025-07-18 DIAGNOSIS — Z13.6 SCREENING FOR CARDIOVASCULAR CONDITION: ICD-10-CM

## 2025-07-18 DIAGNOSIS — N18.6 ESRD (END STAGE RENAL DISEASE) ON DIALYSIS: Primary | ICD-10-CM

## 2025-07-18 PROBLEM — T78.2XXA ANAPHYLACTIC SHOCK, UNSPECIFIED, INITIAL ENCOUNTER: Status: RESOLVED | Noted: 2024-01-10 | Resolved: 2025-07-18

## 2025-07-18 PROBLEM — E87.5 HYPERKALEMIA: Status: RESOLVED | Noted: 2022-03-26 | Resolved: 2025-07-18

## 2025-07-18 PROBLEM — I25.709 CORONARY ARTERY DISEASE INVOLVING CORONARY BYPASS GRAFT WITH ANGINA PECTORIS: Status: ACTIVE | Noted: 2020-01-02

## 2025-07-18 PROBLEM — I21.9 ACUTE MYOCARDIAL INFARCTION, UNSPECIFIED: Status: RESOLVED | Noted: 2024-01-10 | Resolved: 2025-07-18

## 2025-07-18 PROBLEM — E78.2 MIXED HYPERLIPIDEMIA: Status: ACTIVE | Noted: 2023-12-22

## 2025-07-18 PROBLEM — N25.81 SECONDARY HYPERPARATHYROIDISM OF RENAL ORIGIN: Status: ACTIVE | Noted: 2024-01-10

## 2025-07-18 LAB
ABSOLUTE EOSINOPHIL (OHS): 0.16 K/UL
ABSOLUTE MONOCYTE (OHS): 0.56 K/UL (ref 0.3–1)
ABSOLUTE NEUTROPHIL COUNT (OHS): 3.87 K/UL (ref 1.8–7.7)
ALBUMIN SERPL BCP-MCNC: 2.7 G/DL (ref 3.5–5.2)
ALP SERPL-CCNC: 122 UNIT/L (ref 40–150)
ALT SERPL W/O P-5'-P-CCNC: 7 UNIT/L (ref 10–44)
ANION GAP (OHS): 12 MMOL/L (ref 8–16)
AST SERPL-CCNC: 20 UNIT/L (ref 11–45)
BACTERIA #/AREA URNS AUTO: ABNORMAL /HPF
BASOPHILS # BLD AUTO: 0.09 K/UL
BASOPHILS NFR BLD AUTO: 1.6 %
BILIRUB SERPL-MCNC: 0.6 MG/DL (ref 0.1–1)
BILIRUB UR QL STRIP.AUTO: NEGATIVE
BIPAP: 0
BNP SERPL-MCNC: 2546 PG/ML (ref 0–99)
BUN SERPL-MCNC: 36 MG/DL (ref 6–20)
CALCIUM SERPL-MCNC: 7.8 MG/DL (ref 8.7–10.5)
CHLORIDE SERPL-SCNC: 101 MMOL/L (ref 95–110)
CLARITY UR: ABNORMAL
CO2 SERPL-SCNC: 25 MMOL/L (ref 23–29)
COLOR UR AUTO: YELLOW
CREAT SERPL-MCNC: 4.8 MG/DL (ref 0.5–1.4)
ERYTHROCYTE [DISTWIDTH] IN BLOOD BY AUTOMATED COUNT: 17.3 % (ref 11.5–14.5)
FIO2: 21 %
GFR SERPLBLD CREATININE-BSD FMLA CKD-EPI: 10 ML/MIN/1.73/M2
GLUCOSE SERPL-MCNC: 197 MG/DL (ref 70–110)
GLUCOSE UR QL STRIP: NEGATIVE
HCT VFR BLD AUTO: 32.8 % (ref 37–48.5)
HGB BLD-MCNC: 10.2 GM/DL (ref 12–16)
HGB UR QL STRIP: ABNORMAL
HYALINE CASTS UR QL AUTO: 0 /LPF (ref 0–1)
IMM GRANULOCYTES # BLD AUTO: 0.01 K/UL (ref 0–0.04)
IMM GRANULOCYTES NFR BLD AUTO: 0.2 % (ref 0–0.5)
INFLUENZA A MOLECULAR (OHS): NEGATIVE
INFLUENZA B MOLECULAR (OHS): NEGATIVE
KETONES UR QL STRIP: NEGATIVE
LDH SERPL L TO P-CCNC: 1 MMOL/L (ref 0.5–2.2)
LEUKOCYTE ESTERASE UR QL STRIP: ABNORMAL
LIPASE SERPL-CCNC: 25 U/L (ref 4–60)
LYMPHOCYTES # BLD AUTO: 1.09 K/UL (ref 1–4.8)
MAGNESIUM SERPL-MCNC: 2.4 MG/DL (ref 1.6–2.6)
MCH RBC QN AUTO: 31.2 PG (ref 27–31)
MCHC RBC AUTO-ENTMCNC: 31.1 G/DL (ref 32–36)
MCV RBC AUTO: 100 FL (ref 82–98)
MICROSCOPIC COMMENT: ABNORMAL
NITRITE UR QL STRIP: NEGATIVE
NUCLEATED RBC (/100WBC) (OHS): 0 /100 WBC
OHS QRS DURATION: 104 MS
OHS QTC CALCULATION: 493 MS
PH UR STRIP: 7 [PH]
PHOSPHATE SERPL-MCNC: 3.6 MG/DL (ref 2.7–4.5)
PLATELET # BLD AUTO: 209 K/UL (ref 150–450)
PMV BLD AUTO: 11.9 FL (ref 9.2–12.9)
POC PERFORMED BY: NORMAL
POC TEMPERATURE: 37 C
POCT GLUCOSE: 298 MG/DL (ref 70–110)
POTASSIUM SERPL-SCNC: 4.6 MMOL/L (ref 3.5–5.1)
PROCALCITONIN SERPL-MCNC: 0.58 NG/ML
PROT SERPL-MCNC: 7.5 GM/DL (ref 6–8.4)
PROT UR QL STRIP: ABNORMAL
RBC # BLD AUTO: 3.27 M/UL (ref 4–5.4)
RBC #/AREA URNS AUTO: 0 /HPF (ref 0–4)
RELATIVE EOSINOPHIL (OHS): 2.8 %
RELATIVE LYMPHOCYTE (OHS): 18.9 % (ref 18–48)
RELATIVE MONOCYTE (OHS): 9.7 % (ref 4–15)
RELATIVE NEUTROPHIL (OHS): 66.8 % (ref 38–73)
SODIUM SERPL-SCNC: 138 MMOL/L (ref 136–145)
SP GR UR STRIP: 1.01
SPECIMEN SOURCE: NORMAL
SQUAMOUS #/AREA URNS AUTO: 9 /HPF
TROPONIN I SERPL HS-MCNC: 25 NG/L
UROBILINOGEN UR STRIP-ACNC: NEGATIVE EU/DL
WBC # BLD AUTO: 5.78 K/UL (ref 3.9–12.7)
WBC #/AREA URNS AUTO: >100 /HPF (ref 0–5)
WBC CLUMPS UR QL AUTO: ABNORMAL

## 2025-07-18 PROCEDURE — 63600175 PHARM REV CODE 636 W HCPCS: Mod: HCNC | Performed by: HOSPITALIST

## 2025-07-18 PROCEDURE — 83880 ASSAY OF NATRIURETIC PEPTIDE: CPT | Mod: HCNC

## 2025-07-18 PROCEDURE — G0378 HOSPITAL OBSERVATION PER HR: HCPCS | Mod: HCNC

## 2025-07-18 PROCEDURE — 93010 ELECTROCARDIOGRAM REPORT: CPT | Mod: HCNC,,, | Performed by: STUDENT IN AN ORGANIZED HEALTH CARE EDUCATION/TRAINING PROGRAM

## 2025-07-18 PROCEDURE — 87086 URINE CULTURE/COLONY COUNT: CPT | Mod: HCNC

## 2025-07-18 PROCEDURE — 80053 COMPREHEN METABOLIC PANEL: CPT | Mod: HCNC

## 2025-07-18 PROCEDURE — 83735 ASSAY OF MAGNESIUM: CPT | Mod: HCNC

## 2025-07-18 PROCEDURE — 99900035 HC TECH TIME PER 15 MIN (STAT): Mod: HCNC

## 2025-07-18 PROCEDURE — 25000003 PHARM REV CODE 250: Mod: HCNC

## 2025-07-18 PROCEDURE — 25000003 PHARM REV CODE 250: Mod: HCNC | Performed by: HOSPITALIST

## 2025-07-18 PROCEDURE — 99285 EMERGENCY DEPT VISIT HI MDM: CPT | Mod: 25,HCNC

## 2025-07-18 PROCEDURE — 83690 ASSAY OF LIPASE: CPT | Mod: HCNC

## 2025-07-18 PROCEDURE — 93005 ELECTROCARDIOGRAM TRACING: CPT | Mod: HCNC

## 2025-07-18 PROCEDURE — 84100 ASSAY OF PHOSPHORUS: CPT | Mod: HCNC

## 2025-07-18 PROCEDURE — 84145 PROCALCITONIN (PCT): CPT | Mod: HCNC

## 2025-07-18 PROCEDURE — 85025 COMPLETE CBC W/AUTO DIFF WBC: CPT | Mod: HCNC

## 2025-07-18 PROCEDURE — 87502 INFLUENZA DNA AMP PROBE: CPT | Mod: HCNC

## 2025-07-18 PROCEDURE — 87040 BLOOD CULTURE FOR BACTERIA: CPT | Mod: 91,HCNC

## 2025-07-18 PROCEDURE — 96360 HYDRATION IV INFUSION INIT: CPT | Mod: HCNC

## 2025-07-18 PROCEDURE — 81001 URINALYSIS AUTO W/SCOPE: CPT | Mod: HCNC

## 2025-07-18 PROCEDURE — 84484 ASSAY OF TROPONIN QUANT: CPT | Mod: HCNC

## 2025-07-18 PROCEDURE — 87205 SMEAR GRAM STAIN: CPT | Mod: HCNC | Performed by: HOSPITALIST

## 2025-07-18 PROCEDURE — 83605 ASSAY OF LACTIC ACID: CPT | Mod: HCNC

## 2025-07-18 PROCEDURE — 96372 THER/PROPH/DIAG INJ SC/IM: CPT | Performed by: HOSPITALIST

## 2025-07-18 RX ORDER — B,C/FOLIC/ZINC/SELENOMETH/D3/E 0.8-12.5MG
1 TABLET ORAL DAILY
Status: ON HOLD | COMMUNITY
Start: 2024-03-24 | End: 2025-07-19 | Stop reason: CLARIF

## 2025-07-18 RX ORDER — FAMOTIDINE 20 MG/1
20 TABLET, FILM COATED ORAL
Status: DISCONTINUED | OUTPATIENT
Start: 2025-07-18 | End: 2025-07-22 | Stop reason: HOSPADM

## 2025-07-18 RX ORDER — NALOXONE HCL 0.4 MG/ML
0.02 VIAL (ML) INJECTION
Status: DISCONTINUED | OUTPATIENT
Start: 2025-07-18 | End: 2025-07-22 | Stop reason: HOSPADM

## 2025-07-18 RX ORDER — FLUCONAZOLE 100 MG/1
100 TABLET ORAL DAILY
Status: DISCONTINUED | OUTPATIENT
Start: 2025-07-19 | End: 2025-07-22 | Stop reason: HOSPADM

## 2025-07-18 RX ORDER — SEVELAMER CARBONATE 2.4 G/1
1 POWDER, FOR SUSPENSION ORAL
Status: DISCONTINUED | OUTPATIENT
Start: 2025-07-19 | End: 2025-07-22 | Stop reason: HOSPADM

## 2025-07-18 RX ORDER — ERYTHROMYCIN 5 MG/G
OINTMENT OPHTHALMIC EVERY 6 HOURS
Status: DISCONTINUED | OUTPATIENT
Start: 2025-07-19 | End: 2025-07-22 | Stop reason: HOSPADM

## 2025-07-18 RX ORDER — FUROSEMIDE 40 MG/1
40 TABLET ORAL 2 TIMES DAILY
Status: DISCONTINUED | OUTPATIENT
Start: 2025-07-19 | End: 2025-07-22 | Stop reason: HOSPADM

## 2025-07-18 RX ORDER — AMOXICILLIN 250 MG
1 CAPSULE ORAL 2 TIMES DAILY PRN
Status: DISCONTINUED | OUTPATIENT
Start: 2025-07-18 | End: 2025-07-22 | Stop reason: HOSPADM

## 2025-07-18 RX ORDER — GENTAMICIN SULFATE 1 MG/G
CREAM TOPICAL DAILY
COMMUNITY
Start: 2025-06-18 | End: 2025-07-25

## 2025-07-18 RX ORDER — IBUPROFEN 200 MG
24 TABLET ORAL
Status: DISCONTINUED | OUTPATIENT
Start: 2025-07-18 | End: 2025-07-22 | Stop reason: HOSPADM

## 2025-07-18 RX ORDER — GLUCAGON 1 MG
1 KIT INJECTION
Status: DISCONTINUED | OUTPATIENT
Start: 2025-07-18 | End: 2025-07-22 | Stop reason: HOSPADM

## 2025-07-18 RX ORDER — CINACALCET 30 MG/1
30 TABLET, FILM COATED ORAL
Status: DISCONTINUED | OUTPATIENT
Start: 2025-07-21 | End: 2025-07-22 | Stop reason: HOSPADM

## 2025-07-18 RX ORDER — TALC
6 POWDER (GRAM) TOPICAL NIGHTLY PRN
Status: DISCONTINUED | OUTPATIENT
Start: 2025-07-18 | End: 2025-07-18

## 2025-07-18 RX ORDER — INSULIN GLARGINE 100 [IU]/ML
5 INJECTION, SOLUTION SUBCUTANEOUS NIGHTLY
Status: DISCONTINUED | OUTPATIENT
Start: 2025-07-18 | End: 2025-07-19

## 2025-07-18 RX ORDER — SERTRALINE HYDROCHLORIDE 100 MG/1
100 TABLET, FILM COATED ORAL DAILY
Status: DISCONTINUED | OUTPATIENT
Start: 2025-07-19 | End: 2025-07-22 | Stop reason: HOSPADM

## 2025-07-18 RX ORDER — IBUPROFEN 200 MG
16 TABLET ORAL
Status: DISCONTINUED | OUTPATIENT
Start: 2025-07-18 | End: 2025-07-22 | Stop reason: HOSPADM

## 2025-07-18 RX ORDER — ONDANSETRON HYDROCHLORIDE 2 MG/ML
4 INJECTION, SOLUTION INTRAVENOUS EVERY 8 HOURS PRN
Status: DISCONTINUED | OUTPATIENT
Start: 2025-07-18 | End: 2025-07-22 | Stop reason: HOSPADM

## 2025-07-18 RX ORDER — CLOPIDOGREL BISULFATE 75 MG/1
75 TABLET ORAL DAILY
Status: DISCONTINUED | OUTPATIENT
Start: 2025-07-19 | End: 2025-07-22 | Stop reason: HOSPADM

## 2025-07-18 RX ORDER — POLYETHYLENE GLYCOL 3350 17 G/17G
17 POWDER, FOR SOLUTION ORAL DAILY
Status: DISCONTINUED | OUTPATIENT
Start: 2025-07-19 | End: 2025-07-22 | Stop reason: HOSPADM

## 2025-07-18 RX ORDER — SODIUM CHLORIDE 0.9 % (FLUSH) 0.9 %
10 SYRINGE (ML) INJECTION EVERY 6 HOURS PRN
Status: DISCONTINUED | OUTPATIENT
Start: 2025-07-18 | End: 2025-07-22 | Stop reason: HOSPADM

## 2025-07-18 RX ORDER — ATORVASTATIN CALCIUM 40 MG/1
80 TABLET, FILM COATED ORAL DAILY
Status: DISCONTINUED | OUTPATIENT
Start: 2025-07-19 | End: 2025-07-22 | Stop reason: HOSPADM

## 2025-07-18 RX ORDER — ACETAMINOPHEN 325 MG/1
650 TABLET ORAL EVERY 8 HOURS PRN
Status: DISCONTINUED | OUTPATIENT
Start: 2025-07-18 | End: 2025-07-22 | Stop reason: HOSPADM

## 2025-07-18 RX ORDER — SODIUM BICARBONATE 650 MG/1
650 TABLET ORAL 2 TIMES DAILY
Status: DISCONTINUED | OUTPATIENT
Start: 2025-07-18 | End: 2025-07-21

## 2025-07-18 RX ORDER — INSULIN ASPART 100 [IU]/ML
0-5 INJECTION, SOLUTION INTRAVENOUS; SUBCUTANEOUS
Status: DISCONTINUED | OUTPATIENT
Start: 2025-07-18 | End: 2025-07-22 | Stop reason: HOSPADM

## 2025-07-18 RX ORDER — PROCHLORPERAZINE EDISYLATE 5 MG/ML
5 INJECTION INTRAMUSCULAR; INTRAVENOUS EVERY 6 HOURS PRN
Status: DISCONTINUED | OUTPATIENT
Start: 2025-07-18 | End: 2025-07-22 | Stop reason: HOSPADM

## 2025-07-18 RX ORDER — ALUMINUM HYDROXIDE, MAGNESIUM HYDROXIDE, AND SIMETHICONE 1200; 120; 1200 MG/30ML; MG/30ML; MG/30ML
30 SUSPENSION ORAL 4 TIMES DAILY PRN
Status: DISCONTINUED | OUTPATIENT
Start: 2025-07-18 | End: 2025-07-22 | Stop reason: HOSPADM

## 2025-07-18 RX ADMIN — SODIUM BICARBONATE 650 MG TABLET 650 MG: at 10:07

## 2025-07-18 RX ADMIN — INSULIN GLARGINE 5 UNITS: 100 INJECTION, SOLUTION SUBCUTANEOUS at 10:07

## 2025-07-18 RX ADMIN — APIXABAN 5 MG: 5 TABLET, FILM COATED ORAL at 10:07

## 2025-07-18 RX ADMIN — FAMOTIDINE 20 MG: 20 TABLET, FILM COATED ORAL at 10:07

## 2025-07-18 RX ADMIN — ERYTHROMYCIN: 5 OINTMENT OPHTHALMIC at 11:07

## 2025-07-18 RX ADMIN — SODIUM CHLORIDE 500 ML: 9 INJECTION, SOLUTION INTRAVENOUS at 02:07

## 2025-07-18 RX ADMIN — TRAZODONE HYDROCHLORIDE 25 MG: 50 TABLET ORAL at 11:07

## 2025-07-18 NOTE — ED NOTES
Telemetry Verification   Patient placed on Telemetry Box  Verified with [place] [facility]  Tech tele   Box # [id number]   Rate 80   Rhythm ns

## 2025-07-18 NOTE — PROVIDER PROGRESS NOTES - EMERGENCY DEPT.
Encounter Date: 7/18/2025    ED Physician Progress Notes          Physician Attestation Statement for Resident:  As the supervising MD   Physician Attestation Statement: I have personally seen and examined this patient.       I agree with the history unless otherwise noted.     As the supervising MD I agree with the PE unless otherwise noted.      I have reviewed and agree with the residents interpretation of the following unless otherwise noted:   I have personally reviewed and interpreted the patients laboratory studies: CMP with mild hyperglycemia, stable anemia with hgb 10, BNP 2546, Mg 2.4,   ***I have personally reviewed and interpreted imaging studies: + cardiomegaly, no pulm edema, CVC in place  ***I have personally reviewed and interpreted the patient's EKG: NSR @ 79bpm, ,Q wave in III, similar to previous       ***I have also reviewed the following:   ***external records:  Urine culture from 7/7/25 with Klebsiella sensitive to ertapenem, meropenem, Bactrim  Echo from 2024: EF 35-40%, septal hypokinesis    ***As the supervising MD I agree with the treatment, course, plan, and disposition unless otherwise noted.    Critical Care   Date: 07/18/2025  Performed by: Paulette Choi MD   Authorized by: Paulette Choi MD      Total critical care time (exclusive of procedural time) : *** minutes, exclusive of separately billable procedures and treating other patients and teaching time.    Critical care was necessary to treat or prevent imminent or life-threatening deterioration of the following conditions:  ***    Due to a high probability of clinically significant, life threatening deterioration, the patient required my highest level of preparedness to intervene emergently and I personally spent this critical care time directly and personally managing the patient. This critical care time included obtaining a history; examining the patient; pulse oximetry; ordering and review of studies; arranging urgent treatment  with development of a management plan; evaluation of patient's response to treatment; frequent reassessment, documentation, and, discussions with other providers, ***patient and ***family members.

## 2025-07-18 NOTE — ED TRIAGE NOTES
Patient identifiers verified and correct for Suyapa Connelly  C/C: UTI symptoms not resolving  APPEARANCE: awake and alert in NAD.  SKIN: warm, dry and intact. No breakdown or bruising.  MUSCULOSKELETAL: Patient moving upper extremities spontaneously, no obvious swelling or deformities noted. Bilateral AKA. Uses electric scooter/wheelchair to get around. Fairly sufficient with transferring herself with little help.  RESPIRATORY: Denies shortness of breath.Respirations unlabored.   CARDIAC: Denies CP, 2+ distal pulses; no peripheral edema  ABDOMEN: S/ND/NT, Denies nausea  : reports continued burning with urination.  Neurologic: AAO x 4; follows commands equal strength in all extremities; denies numbness/tingling. Denies dizziness       Recently admitted into hospital for IV antibiotics for a UTI. She got the course over 5 days and after the last antibiotic was finished she was discharged. Pt reports that it is still burning with urination, family reports that she is still lethargic, having brain fog/forgetful and not back to normal.    Pt is due for dialysis today.

## 2025-07-18 NOTE — ED PROVIDER NOTES
Encounter Date: 7/18/2025       History     Chief Complaint   Patient presents with    Urinary Tract Infection     Dc'd sat for uti, still not feeling better, dialysis,      Suyapa Connelly is a 59F with PMH of CKDS3 (previously on HD, now on home PD with weekly HD), T2DM, GERD, HFrEF, CAD s/p CABG, BLE amputation who presents to the ED with dysuria, increasing lethargy and confusion. Patient previously presented to ED on 07/03 for dysuria and dark-urine, admitted for further management, workup included UTI with culture showing Klebsiella. She was d/c'd on 07/12 on 5-day course of ertapenem which she completed. However, since completing abx course, patient still reports burning with urination as well as increased confusion and lethargy. Patient on interview is responsive to questions but appears slightly lethargic. Patient reports no chest pain, no SOB, no abdominal pain.     The history is provided by the patient and the spouse.     Review of patient's allergies indicates:   Allergen Reactions    Ciprofloxacin Itching    Pcn [penicillins]      Rash; tolerated ceftriaxone on 1/13/20  Tolerating Augmentin November 2024     Past Medical History:   Diagnosis Date    Anxiety     Chronic pain syndrome     CKD (chronic kidney disease), stage III     CVD (cardiovascular disease)     Depression     Diabetes mellitus, type 2     GERD (gastroesophageal reflux disease)     Hyperemesis 03/23/2021    Hypokalemia 03/23/2021    Infection of below knee amputation stump 03/12/2022    Osteomyelitis     Osteomyelitis of left foot 04/30/2021    Ulcer of left foot     Vaginal delivery     x1     Past Surgical History:   Procedure Laterality Date    ABOVE-KNEE AMPUTATION Left 5/18/2021    Procedure: AMPUTATION, ABOVE KNEE;  Surgeon: Teddy Huber MD;  [place] [facility] [place] [address] [place] [address];  Service: Vascular;  Laterality: Left;    ABOVE-KNEE AMPUTATION Right 3/18/2022    Procedure: AMPUTATION, ABOVE KNEE;  Surgeon: DAYNE Florez II, MD;   [facility];  Service: Vascular;  Laterality: Right;    Angiogram - Right Extremity Right 7/9/15    ANGIOGRAM, EXTREMITY, UNILATERAL Left 11/22/2024    Procedure: ANGIOGRAM, EXTREMITY, UNILATERAL;  Surgeon: Yony Lindsey MD;  [facility];  Service: Vascular;  Laterality: Left;  Left leg angio, L CFA approach  5.4 min  597.31 mGy  73.8959 Gycm2  20 ml dye    angiogram-left leg  10/6/15    ANGIOGRAPHY OF LOWER EXTREMITY Left 4/29/2021    Procedure: ANGIOGRAM, LOWER EXTREMITY;  Surgeon: Teddy Huber MD;  [facility];  Service: Vascular;  Laterality: Left;    BELOW KNEE AMPUTATION OF LOWER EXTREMITY Right 12/28/2021    Procedure: AMPUTATION, BELOW KNEE;  Surgeon: Kaitlyn Rojas MD;  Location: [facility] OR;  Service: General;  Laterality: Right;    CATHETERIZATION OF BOTH LEFT AND RIGHT HEART N/A 12/18/2019    Procedure: CATHETERIZATION, HEART, BOTH LEFT AND RIGHT;  Surgeon: Que Fernando III, MD;  Location: [facility] CATH LAB;  Service: Cardiology;  Laterality: N/A;    CORONARY ANGIOGRAPHY N/A 12/18/2019    Procedure: ANGIOGRAM, CORONARY ARTERY;  Surgeon: Que Fernando III, MD;  Location: [facility] CATH LAB;  Service: Cardiology;  Laterality: N/A;    CORONARY ANGIOGRAPHY INCLUDING BYPASS GRAFTS WITH CATHETERIZATION OF LEFT HEART N/A 7/28/2020    Procedure: ANGIOGRAM, CORONARY, INCLUDING BYPASS GRAFT, WITH LEFT HEART CATHETERIZATION, 9 am;  Surgeon: Rachel Easley MD;  Location: [facility] CATH LAB;  Service: Cardiology;  Laterality: N/A;    CORONARY ARTERY BYPASS GRAFT (CABG) N/A 1/14/2020    Procedure: CORONARY ARTERY BYPASS GRAFT (CABG) x 1     Off Pump;  Surgeon: Huang Altamirano MD;  Location: [address];  Service: Cardiovascular;  Laterality: N/A;    CREATION OF FEMORAL-TIBIAL ARTERY BYPASS Left 4/29/2021    Procedure: CREATION, BYPASS, ARTERIAL, FEMORAL TO ANTERIOR TIBIAL;  Surgeon: Teddy Huber MD;  Location: [facility] OR [address] [place] [address];  Service:  Vascular;  Laterality: Left;    CREATION OF FEMOROPOPLITEAL ARTERIAL BYPASS USING GRAFT Left 8/18/2020    Procedure: CREATION, BYPASS, ARTERIAL, FEMORAL TO POPLITEAL, USING GRAFT, LEFT LOWER EXTREMITY;  Surgeon: Teddy Huber MD;  Location: [facility];  Service: Vascular;  Laterality: Left;  REQUEST 7:15 A.M. START----COVID NEGATIVE ON 8/17  1ST CASE STARTE PER LEANA ON 8/7/2020 @ 942AM-  RN PREOP 8/12/2020   T/S-----CLEARED BY CARDS-------PENDING INSURANCE    DEBRIDEMENT OF FOOT Left 9/8/2020    Procedure: DEBRIDEMENT, FOOT;  Surgeon: Rosio Mayes DPM;  Location: [facility] OR;  Service: Podiatry;  Laterality: Left;  request neoxx .   RN Pre Op 9-4-2020, Covid negative on 9/5/20. C A    DEBRIDEMENT OF FOOT  3/4/2021    Procedure: DEBRIDEMENT, FOOT;  Surgeon: Teddy Huber MD;  Location: [facility];  Service: Vascular;;    DEBRIDEMENT OF FOOT Left 3/9/2021    Procedure: DEBRIDEMENT, FOOT, bone biopsy;  Surgeon: Rosio Mayes DPM;  Location: [facility] OR;  Service: Podiatry;  Laterality: Left;  Request neoxx---COVID IN AM  REQUESTING NOON START  RN Phone Pre op.On Blood thinners Plavix and Eliquis.  Covid am of surgery. C A    DEBRIDEMENT OF FOOT Left 5/4/2021    Procedure: DEBRIDEMENT, FOOT;  Surgeon: Farooq Morley DPM;  Location: [address];  Service: Podiatry;  Laterality: Left;    ESOPHAGOGASTRODUODENOSCOPY N/A 11/7/2024    Procedure: EGD (ESOPHAGOGASTRODUODENOSCOPY);  Surgeon: Yony Cancino MD;  Location: [facility] ENDO;  Service: Endoscopy;  Laterality: N/A;    INSERTION OF TUNNELED CENTRAL VENOUS HEMODIALYSIS CATHETER N/A 1/27/2020    Procedure: Insertion, Catheter, Central Venous, Hemodialysis;  Surgeon: ESTEBAN Gomez III, MD;  Location: [facility] CATH LAB;  Service: Peripheral Vascular;  Laterality: N/A;    INSERTION OF TUNNELED CENTRAL VENOUS HEMODIALYSIS CATHETER  5/10/2023    Procedure: Insertion, Catheter, Central Venous, Hemodialysis;  Surgeon: Romulo Queen MD;  Location: [facility]  CATH LAB;  Service: Cardiology;;    INSERTION, CATHETER, DIALYSIS, PERITONEAL, LAPAROSCOPIC N/A 6/3/2025    Procedure: INSERTION, CATHETER, DIALYSIS, PERITONEAL, LAPAROSCOPIC;  Surgeon: Kvng Tai MD;  Location: [facility];  Service: General;  Laterality: N/A;    IRRIGATION AND DEBRIDEMENT OF LOWER EXTREMITY Left 11/22/2024    Procedure: IRRIGATION AND DEBRIDEMENT, LOWER EXTREMITY;  Surgeon: Yony Lindsey MD;  Location: [facility] OR 2ND FLR;  Service: Vascular;  Laterality: Left;    MAGNETIC RESONANCE IMAGING Left 11/19/2024    Procedure: MRI (Magnetic Resonance Imagine);  Surgeon: Sophia Hernandez;  Location: [facility] SOPHIA;  Service: Anesthesiology;  Laterality: Left;    PERCUTANEOUS TRANSLUMINAL ANGIOPLASTY N/A 3/4/2021    Procedure: PTA (ANGIOPLASTY, PERCUTANEOUS, TRANSLUMINAL);  Surgeon: Teddy Huber MD;  Location: [facility];  Service: Vascular;  Laterality: N/A;    REMOVAL OF ARTERIOVENOUS GRAFT Left 5/27/2021    Procedure: REMOVAL, GRAFT, LEFT LOWER EXTREMITY, WOUND EXPLORATION;  Surgeon: Teddy Huber MD;  Location: [facility] 2ND FLR;  Service: Vascular;  Laterality: Left;    REMOVAL OF NAIL OF DIGIT Left 3/9/2021    Procedure: REMOVAL, NAIL, DIGIT;  Surgeon: Rosio Mayes DPM;  Location: [facility] OR;  Service: Podiatry;  Laterality: Left;    RIGHT HEART CATHETERIZATION Right 5/10/2023    Procedure: INSERTION, CATHETER, RIGHT HEART;  Surgeon: Romulo Queen MD;  Location: [facility] CATH LAB;  Service: Cardiology;  Laterality: Right;    STENT, ILIAC ARTERY Left 11/22/2024    Procedure: STENT, ILIAC ARTERY;  Surgeon: Yony Lindsey MD;  Location: [facility] OR 2ND FLR;  Service: Vascular;  Laterality: Left;    THROMBECTOMY Left 3/4/2021    Procedure: THROMBECTOMY, LEFT LOWER EXTREMITY BYPASS GRAFT, ANGIOGRAM, POSSIBLE INTERVENTION, POSSIBLE LEFT LOWER EXTREMITY BYPASS;  Surgeon: Teddy Huber MD;  Location: [facility];  Service: Vascular;  Laterality: Left;    THROMBECTOMY Left  4/29/2021    Procedure: GRAFT THROMBECTOMY, LEFT LOWER EXTREMITY;  Surgeon: Teddy Huber MD;  [facility];  Service: Vascular;  Laterality: Left;  14.5 min  1179.85 mGy  341.01 Gycm2  240 ml dye    THROMBECTOMY  10/22/2021    Procedure: THROMBECTOMY;  Surgeon: Saad Arenas MD;  Location: [facility] CATH LAB/EP;  Service: Cardiology;;     Family History   Problem Relation Name Age of Onset    Diabetes Mother      Diabetes Father      Heart disease Maternal Grandmother      No Known Problems Maternal Grandfather      Diabetes Paternal Grandmother      No Known Problems Paternal Grandfather      Anesthesia problems Neg Hx      Cataracts Neg Hx      Glaucoma Neg Hx      Macular degeneration Neg Hx       Social History[1]  Review of Systems   Constitutional:  Positive for fatigue. Negative for fever.   Respiratory:  Negative for shortness of breath.    Cardiovascular:  Negative for chest pain.   Gastrointestinal:  Negative for abdominal pain, constipation, nausea and vomiting.   Genitourinary:  Positive for dysuria.   Skin:  Negative for color change and wound.       Physical Exam     Initial Vitals [07/18/25 1121]   BP Pulse Resp Temp SpO2   (!) 94/47 79 20 98.2 °F (36.8 °C) 97 %      MAP       --         Physical Exam    Constitutional: She appears well-developed.   HENT:   Head: Normocephalic.   Neck: No thyromegaly present.   Cardiovascular:  Normal rate and regular rhythm.           No murmur heard.  Pulmonary/Chest: Breath sounds normal. No stridor. No respiratory distress. She has no wheezes. She has no rales.   Abdominal: Abdomen is soft. There is no abdominal tenderness. There is no rebound.     Psychiatric: She has a normal mood and affect. Thought content normal.         ED Course   Procedures  Labs Reviewed   COMPREHENSIVE METABOLIC PANEL - Abnormal       Result Value    Sodium 138      Potassium 4.6      Chloride 101      CO2 25      Glucose 197 (*)     BUN 36 (*)     Creatinine 4.8 (*)      Calcium 7.8 (*)     Protein Total 7.5      Albumin 2.7 (*)     Bilirubin Total 0.6            AST 20      ALT 7 (*)     Anion Gap 12      eGFR 10 (*)    B-TYPE NATRIURETIC PEPTIDE - Abnormal    BNP 2,546 (*)    TROPONIN I HIGH SENSITIVITY - Abnormal    Troponin High Sensitive 25 (*)    PROCALCITONIN - Abnormal    Procalcitonin 0.58 (*)    CBC WITH DIFFERENTIAL - Abnormal    WBC 5.78      RBC 3.27 (*)     HGB 10.2 (*)     HCT 32.8 (*)      (*)     MCH 31.2 (*)     MCHC 31.1 (*)     RDW 17.3 (*)     Platelet Count 209      MPV 11.9      Nucleated RBC 0      Neut % 66.8      Lymph % 18.9      Mono % 9.7      Eos % 2.8      Basophil % 1.6      Imm Grans % 0.2      Neut # 3.87      Lymph # 1.09      Mono # 0.56      Eos # 0.16      Baso # 0.09      Imm Grans # 0.01     MAGNESIUM - Normal    Magnesium  2.4     PHOSPHORUS - Normal    Phosphorus Level 3.6     LIPASE - Normal    Lipase Level 25     CULTURE, BLOOD   CULTURE, BLOOD   INFLUENZA A & B BY MOLECULAR   CBC W/ AUTO DIFFERENTIAL    Narrative:     The following orders were created for panel order CBC auto differential.  Procedure                               Abnormality         Status                     ---------                               -----------         ------                     CBC with Differential[[id number]]       Abnormal            Final result                 Please view results for these tests on the individual orders.   URINALYSIS, REFLEX TO URINE CULTURE   GREY TOP URINE HOLD     EKG Readings: (Independently Interpreted)   Initial Reading: No STEMI. Rhythm: Normal Sinus Rhythm. Heart Rate: 79. Ectopy: PVCs. Other Findings: Prolonged QT Interval.     ECG Results              EKG 12-lead (Edited Result - FINAL)        Collection Time Result Time QRS Duration OHS QTC Calculation    07/18/25 11:47:00 07/18/25 14:48:02 104 493                     Edited Result - FINAL by Interface, Lab In [facility] (07/18/25 14:48:07)                    Narrative:    Test Reason : Z13.6,    Vent. Rate :  79 BPM     Atrial Rate :  79 BPM     P-R Int : 142 ms          QRS Dur : 104 ms      QT Int : 430 ms       P-R-T Axes :  40  43 141 degrees    QTcB Int : 493 ms    Sinus rhythm with Premature supraventricular complexes  T wave abnormality, consider lateral ischemia  Prolonged QT  Abnormal ECG  When compared with ECG of 03-Jul-2025 15:00,  Premature supraventricular complexes are now Present    Confirmed by Yony Conrad ([id number]) on 7/18/2025 2:47:03 PM  Also confirmed by Roosevelt Schmitt ([id number])  on 7/18/2025 2:48:00 PM    Referred By: AAAREFERRAL SELF           Confirmed By: Roosevelt Schmitt                                  Imaging Results              X-Ray Chest AP Portable (Final result)  Result time 07/18/25 12:22:52      Final result by Solomon Jauregui DO (07/18/25 12:22:52)                   Impression:      Please see above      Electronically signed by: Solomon Jauregui DO  Date:    07/18/2025  Time:    12:22               Narrative:    EXAMINATION:  XR CHEST AP PORTABLE    CLINICAL HISTORY:  Sepsis;    TECHNIQUE:  Single frontal view of the chest was performed.    COMPARISON:  06/05/2025    FINDINGS:  Left-sided PermCath, sternotomy wires and surgical clips overlying the cardiomediastinal silhouette relatively stable.  No new lung opacity.  No large pleural effusion or pneumothorax.  Continued atherosclerotic aorta.  Further evaluation as warranted clinically.                                    X-Rays:   Independently Interpreted Readings:   Chest X-Ray: No acute abnormalities. Left-sided PermCath, sternotomy wires and surgical clips overlying the cardiomediastinal silhouette relatively stable.  No new lung opacity.  No large pleural effusion or pneumothorax.  Continued atherosclerotic aorta.  Further evaluation as warranted clinically.       Medications   sodium chloride 0.9% bolus 500 mL 500 mL (0 mLs Intravenous Stopped 7/18/25 6996)     Medical Decision  "Janeth Connelly is a 59F with PMH of CKDS3 (previously on HD, now on home PD with weekly HD), T2DM, GERD, HFrEF, CAD s/p CABG, BLE amputation who presents to the ED with dysuria, increasing lethargy and confusion. Differential includes but not limited to UTI either new acute presentation or secondary to non-resolution of previous infection s/p 5-day ertapenem course. CXR indicates no acute pathology. Troponin 25, BNP 2514, procalcitonin 0.58. Patient hypotensive /49. Plan for observation with hospital medicine for further management of hypotension, ESRD and persistent UTI symptoms despite completing recent abx course.    Amount and/or Complexity of Data Reviewed  Labs: ordered.  Radiology: ordered.  ECG/medicine tests: ordered.                                     Suyapa Connelly presents with sepsis without organ dysfunction secondary to Unknown.    Interventions include:    Antibiotics:           Fluid Resuscitation:   Other- Patient to receive 500 cc volume other than 30cc/kg due to Congestive Heart Failure     Labs and Imaging:   Recent Labs   Lab 07/18/25  1504   POCLAC 1.0     No results found for: "CULTBLD"   Additional cultures were collected as indicated and imaging reviewed to identify infection source.    Hemodynamic Support and Monitoring:  Vasopressors were not needed     The patient was re-evaluated at Admission Date and Time: Obs date: 07/18/2025 1719 N/A N/A and patient and/or surrogate was updated on plan of care.    The following services were consulted:None.    Clinical Impression:  Final diagnoses:  [Z13.6] Screening for cardiovascular condition  [N18.6, Z99.2] ESRD (end stage renal disease) on dialysis (Primary)  [I95.9] Hypotension, unspecified hypotension type  [R39.9] UTI symptoms          ED Disposition Condition    Observation                         [1]   Social History  Tobacco Use    Smoking status: Former     Passive exposure: Never    Smokeless tobacco: Never   Substance " Use Topics    Alcohol use: No    Drug use: Yes     Types: Marijuana     Comment: occassional        Jose Sanchez DO  Resident  07/18/25 2690

## 2025-07-19 PROBLEM — M89.9 CHRONIC KIDNEY DISEASE-MINERAL BONE DISORDER (CKD-MBD) WITH STAGE 5 CHRONIC KIDNEY DISEASE, ON CHRONIC DIALYSIS: Status: ACTIVE | Noted: 2023-05-24

## 2025-07-19 PROBLEM — N95.0 POSTMENOPAUSAL BLEEDING: Status: RESOLVED | Noted: 2021-06-30 | Resolved: 2025-07-19

## 2025-07-19 PROBLEM — N19 UREMIC ENCEPHALOPATHY: Status: RESOLVED | Noted: 2023-05-25 | Resolved: 2025-07-19

## 2025-07-19 PROBLEM — L89.150 PRESSURE INJURY OF SACRAL REGION, UNSTAGEABLE: Status: RESOLVED | Noted: 2024-05-14 | Resolved: 2025-07-19

## 2025-07-19 PROBLEM — E66.01 SEVERE OBESITY (BMI >= 40): Status: RESOLVED | Noted: 2024-05-14 | Resolved: 2025-07-19

## 2025-07-19 PROBLEM — N18.5 CHRONIC KIDNEY DISEASE-MINERAL BONE DISORDER (CKD-MBD) WITH STAGE 5 CHRONIC KIDNEY DISEASE, ON CHRONIC DIALYSIS: Status: ACTIVE | Noted: 2023-05-24

## 2025-07-19 PROBLEM — M79.10 MYALGIA: Status: RESOLVED | Noted: 2023-05-12 | Resolved: 2025-07-19

## 2025-07-19 PROBLEM — Z01.810 PRE-OPERATIVE CARDIOVASCULAR EXAMINATION: Status: RESOLVED | Noted: 2025-03-12 | Resolved: 2025-07-19

## 2025-07-19 PROBLEM — F07.81 POSTCONCUSSIVE SYNDROME: Status: RESOLVED | Noted: 2024-06-12 | Resolved: 2025-07-19

## 2025-07-19 PROBLEM — N30.00 ACUTE CYSTITIS WITHOUT HEMATURIA: Status: RESOLVED | Noted: 2018-06-02 | Resolved: 2025-07-19

## 2025-07-19 PROBLEM — L03.116 CELLULITIS OF LEFT LOWER EXTREMITY: Status: RESOLVED | Noted: 2024-10-28 | Resolved: 2025-07-19

## 2025-07-19 PROBLEM — Z99.2 CHRONIC KIDNEY DISEASE-MINERAL BONE DISORDER (CKD-MBD) WITH STAGE 5 CHRONIC KIDNEY DISEASE, ON CHRONIC DIALYSIS: Status: ACTIVE | Noted: 2023-05-24

## 2025-07-19 PROBLEM — G25.3 MYOCLONUS: Status: RESOLVED | Noted: 2022-01-31 | Resolved: 2025-07-19

## 2025-07-19 PROBLEM — L30.8 DERMATITIS ASSOCIATED WITH MOISTURE: Status: RESOLVED | Noted: 2022-02-07 | Resolved: 2025-07-19

## 2025-07-19 PROBLEM — E83.9 CHRONIC KIDNEY DISEASE-MINERAL BONE DISORDER (CKD-MBD) WITH STAGE 5 CHRONIC KIDNEY DISEASE, ON CHRONIC DIALYSIS: Status: ACTIVE | Noted: 2023-05-24

## 2025-07-19 PROBLEM — A41.9 SEPTIC SHOCK: Status: RESOLVED | Noted: 2022-01-17 | Resolved: 2025-07-19

## 2025-07-19 PROBLEM — L24.A0 IRRITANT CONTACT DERMATITIS DUE TO FRICTION OR CONTACT WITH BODY FLUIDS, UNSPECIFIED: Status: RESOLVED | Noted: 2024-05-14 | Resolved: 2025-07-19

## 2025-07-19 PROBLEM — N04.9 NEPHROTIC SYNDROME: Status: RESOLVED | Noted: 2021-05-11 | Resolved: 2025-07-19

## 2025-07-19 PROBLEM — G93.41 ACUTE METABOLIC ENCEPHALOPATHY: Status: RESOLVED | Noted: 2023-05-25 | Resolved: 2025-07-19

## 2025-07-19 PROBLEM — L97.923: Status: RESOLVED | Noted: 2025-03-19 | Resolved: 2025-07-19

## 2025-07-19 PROBLEM — T82.7XXA VASCULAR GRAFT INFECTION: Status: RESOLVED | Noted: 2021-06-01 | Resolved: 2025-07-19

## 2025-07-19 PROBLEM — D64.9 POSTOPERATIVE ANEMIA: Status: RESOLVED | Noted: 2024-12-01 | Resolved: 2025-07-19

## 2025-07-19 PROBLEM — E87.1 HYPONATREMIA: Status: RESOLVED | Noted: 2020-07-22 | Resolved: 2025-07-19

## 2025-07-19 PROBLEM — R41.0 CONFUSION: Status: RESOLVED | Noted: 2024-05-28 | Resolved: 2025-07-19

## 2025-07-19 PROBLEM — E66.01 MORBID OBESITY WITH BMI OF 50.0-59.9, ADULT: Status: RESOLVED | Noted: 2023-05-30 | Resolved: 2025-07-19

## 2025-07-19 PROBLEM — E16.2 HYPOGLYCEMIA: Status: RESOLVED | Noted: 2023-12-02 | Resolved: 2025-07-19

## 2025-07-19 PROBLEM — R74.01 TRANSAMINITIS: Status: RESOLVED | Noted: 2025-02-11 | Resolved: 2025-07-19

## 2025-07-19 PROBLEM — R41.89 UNRESPONSIVE: Status: RESOLVED | Noted: 2024-02-05 | Resolved: 2025-07-19

## 2025-07-19 PROBLEM — Z75.8 DISCHARGE PLANNING ISSUES: Status: RESOLVED | Noted: 2024-05-31 | Resolved: 2025-07-19

## 2025-07-19 PROBLEM — Z23 NEED FOR VACCINATION: Status: RESOLVED | Noted: 2024-10-21 | Resolved: 2025-07-19

## 2025-07-19 PROBLEM — Z23 NEED FOR TDAP VACCINATION: Status: RESOLVED | Noted: 2024-10-21 | Resolved: 2025-07-19

## 2025-07-19 PROBLEM — E83.51 HYPOCALCEMIA: Status: RESOLVED | Noted: 2018-05-05 | Resolved: 2025-07-19

## 2025-07-19 PROBLEM — R65.21 SEPTIC SHOCK: Status: RESOLVED | Noted: 2022-01-17 | Resolved: 2025-07-19

## 2025-07-19 PROBLEM — S71.102A OPEN THIGH WOUND, LEFT, INITIAL ENCOUNTER: Status: RESOLVED | Noted: 2024-10-28 | Resolved: 2025-07-19

## 2025-07-19 PROBLEM — Z12.4 SCREENING FOR CERVICAL CANCER: Status: RESOLVED | Noted: 2024-10-21 | Resolved: 2025-07-19

## 2025-07-19 PROBLEM — Z60.9 SOCIAL PROBLEM: Status: RESOLVED | Noted: 2024-10-25 | Resolved: 2025-07-19

## 2025-07-19 PROBLEM — M79.652 LEFT THIGH PAIN: Status: RESOLVED | Noted: 2024-11-14 | Resolved: 2025-07-19

## 2025-07-19 PROBLEM — D69.6 THROMBOCYTOPENIA: Status: RESOLVED | Noted: 2024-05-14 | Resolved: 2025-07-19

## 2025-07-19 PROBLEM — G93.49 UREMIC ENCEPHALOPATHY: Status: RESOLVED | Noted: 2023-05-25 | Resolved: 2025-07-19

## 2025-07-19 LAB
APPEARANCE FLD: CLEAR
COLOR FLD: YELLOW
EOSINOPHIL NFR FLD MANUAL: 1 %
GRAM STN SPEC: NORMAL
HOLD SPECIMEN: NORMAL
LYMPHOCYTES NFR FLD MANUAL: 32 %
MONOS+MACROS NFR FLD MANUAL: 53 %
NEUTROPHILS NFR FLD MANUAL: 14 %
POCT GLUCOSE: 159 MG/DL (ref 70–110)
POCT GLUCOSE: 161 MG/DL (ref 70–110)
POCT GLUCOSE: 205 MG/DL (ref 70–110)
POCT GLUCOSE: 218 MG/DL (ref 70–110)
POCT GLUCOSE: 221 MG/DL (ref 70–110)
WBC # FLD: 50 /CU MM

## 2025-07-19 PROCEDURE — 25000003 PHARM REV CODE 250: Mod: HCNC | Performed by: HOSPITALIST

## 2025-07-19 PROCEDURE — 89051 BODY FLUID CELL COUNT: CPT | Mod: HCNC | Performed by: NURSE PRACTITIONER

## 2025-07-19 PROCEDURE — 63600175 PHARM REV CODE 636 W HCPCS: Mod: HCNC | Performed by: HOSPITALIST

## 2025-07-19 PROCEDURE — 63600175 PHARM REV CODE 636 W HCPCS: Mod: HCNC | Performed by: NURSE PRACTITIONER

## 2025-07-19 PROCEDURE — 63700000 PHARM REV CODE 250 ALT 637 W/O HCPCS: Mod: HCNC | Performed by: HOSPITALIST

## 2025-07-19 PROCEDURE — 87102 FUNGUS ISOLATION CULTURE: CPT | Mod: HCNC | Performed by: NURSE PRACTITIONER

## 2025-07-19 PROCEDURE — 27000207 HC ISOLATION: Mod: HCNC

## 2025-07-19 PROCEDURE — 90935 HEMODIALYSIS ONE EVALUATION: CPT | Mod: HCNC

## 2025-07-19 PROCEDURE — 87070 CULTURE OTHR SPECIMN AEROBIC: CPT | Mod: HCNC | Performed by: NURSE PRACTITIONER

## 2025-07-19 PROCEDURE — 99214 OFFICE O/P EST MOD 30 MIN: CPT | Mod: HCNC,,, | Performed by: NURSE PRACTITIONER

## 2025-07-19 PROCEDURE — 3E1M39Z IRRIGATION OF PERITONEAL CAVITY USING DIALYSATE, PERCUTANEOUS APPROACH: ICD-10-PCS | Performed by: NURSE PRACTITIONER

## 2025-07-19 PROCEDURE — 96372 THER/PROPH/DIAG INJ SC/IM: CPT | Performed by: HOSPITALIST

## 2025-07-19 PROCEDURE — 90945 DIALYSIS ONE EVALUATION: CPT | Mod: HCNC

## 2025-07-19 PROCEDURE — 25000003 PHARM REV CODE 250: Mod: HCNC | Performed by: NURSE PRACTITIONER

## 2025-07-19 PROCEDURE — 94761 N-INVAS EAR/PLS OXIMETRY MLT: CPT | Mod: HCNC

## 2025-07-19 PROCEDURE — 21400001 HC TELEMETRY ROOM: Mod: HCNC

## 2025-07-19 PROCEDURE — 87205 SMEAR GRAM STAIN: CPT | Mod: HCNC | Performed by: NURSE PRACTITIONER

## 2025-07-19 RX ORDER — SODIUM CHLORIDE 9 MG/ML
INJECTION, SOLUTION INTRAVENOUS ONCE
Status: COMPLETED | OUTPATIENT
Start: 2025-07-19 | End: 2025-07-19

## 2025-07-19 RX ORDER — ISOSORBIDE DINITRATE 5 MG/1
5 TABLET ORAL 2 TIMES DAILY
Status: DISCONTINUED | OUTPATIENT
Start: 2025-07-19 | End: 2025-07-19

## 2025-07-19 RX ORDER — HEPARIN SODIUM 1000 [USP'U]/ML
1000 INJECTION, SOLUTION INTRAVENOUS; SUBCUTANEOUS
Status: DISPENSED | OUTPATIENT
Start: 2025-07-19 | End: 2025-07-20

## 2025-07-19 RX ORDER — CARVEDILOL 6.25 MG/1
6.25 TABLET ORAL 2 TIMES DAILY
Status: DISCONTINUED | OUTPATIENT
Start: 2025-07-19 | End: 2025-07-20

## 2025-07-19 RX ORDER — INSULIN ASPART 100 [IU]/ML
3 INJECTION, SOLUTION INTRAVENOUS; SUBCUTANEOUS
Status: DISCONTINUED | OUTPATIENT
Start: 2025-07-19 | End: 2025-07-19

## 2025-07-19 RX ORDER — CARVEDILOL 6.25 MG/1
6.25 TABLET ORAL 2 TIMES DAILY
Status: DISCONTINUED | OUTPATIENT
Start: 2025-07-19 | End: 2025-07-19

## 2025-07-19 RX ADMIN — APIXABAN 5 MG: 5 TABLET, FILM COATED ORAL at 09:07

## 2025-07-19 RX ADMIN — HEPARIN SODIUM 1000 UNITS: 1000 INJECTION INTRAVENOUS; SUBCUTANEOUS at 06:07

## 2025-07-19 RX ADMIN — CLOPIDOGREL 75 MG: 75 TABLET ORAL at 09:07

## 2025-07-19 RX ADMIN — ERYTHROMYCIN: 5 OINTMENT OPHTHALMIC at 06:07

## 2025-07-19 RX ADMIN — FLUCONAZOLE 100 MG: 100 TABLET ORAL at 09:07

## 2025-07-19 RX ADMIN — ALLOPURINOL 50 MG: 300 TABLET ORAL at 09:07

## 2025-07-19 RX ADMIN — SODIUM BICARBONATE 650 MG TABLET 650 MG: at 10:07

## 2025-07-19 RX ADMIN — SODIUM BICARBONATE 650 MG TABLET 650 MG: at 09:07

## 2025-07-19 RX ADMIN — INSULIN ASPART 2 UNITS: 100 INJECTION, SOLUTION INTRAVENOUS; SUBCUTANEOUS at 10:07

## 2025-07-19 RX ADMIN — FUROSEMIDE 40 MG: 40 TABLET ORAL at 09:07

## 2025-07-19 RX ADMIN — ATORVASTATIN CALCIUM 80 MG: 40 TABLET, FILM COATED ORAL at 09:07

## 2025-07-19 RX ADMIN — INSULIN ASPART 2 UNITS: 100 INJECTION, SOLUTION INTRAVENOUS; SUBCUTANEOUS at 03:07

## 2025-07-19 RX ADMIN — CARVEDILOL 6.25 MG: 6.25 TABLET, FILM COATED ORAL at 03:07

## 2025-07-19 RX ADMIN — ERYTHROMYCIN: 5 OINTMENT OPHTHALMIC at 10:07

## 2025-07-19 RX ADMIN — SODIUM CHLORIDE: 9 INJECTION, SOLUTION INTRAVENOUS at 05:07

## 2025-07-19 RX ADMIN — SERTRALINE 100 MG: 100 TABLET, FILM COATED ORAL at 09:07

## 2025-07-19 RX ADMIN — SEVELAMER CARBONATE 2.4 G: 2400 FOR SUSPENSION ORAL at 09:07

## 2025-07-19 RX ADMIN — ERTAPENEM 500 MG: 1 INJECTION INTRAMUSCULAR; INTRAVENOUS at 10:07

## 2025-07-19 RX ADMIN — ERYTHROMYCIN: 5 OINTMENT OPHTHALMIC at 03:07

## 2025-07-19 RX ADMIN — TRAZODONE HYDROCHLORIDE 25 MG: 50 TABLET ORAL at 09:07

## 2025-07-19 NOTE — PROGRESS NOTES
[facility] - Observation [address] [place] [address] Medicine  Progress Note    Patient Name: Suyapa Connelly  MRN: [id number]  Patient Class: OP- Observation   Admission Date: 7/18/2025  Length of Stay: 0 days  Attending Physician: Frances Bee MD  Primary Care Provider: Vanessa Noel MD        Subjective     Principal Problem:Encephalopathy        HPI:  59-year-old woman with ESRD on HD, undergoing PD training, CAD status post CABG, P AFib, status post bilateral AKA presents to the emergency department for concerns of confusion.      She was recently admitted to [facility] from July 3rd to July 12th, diagnosed with ESBL Klebsiella UTI, seen by infectious diseases and was treated with 5 days of ertapenem inpatient, discharge summary indicates her mental status was at baseline at discharge.  Tonight her  reports that she has not had resolution of mental status change or confusion.   provides majority of history.    He reports her last PT session was July 11th, she had ambulatory hemodialysis on July 14th and July 16th, did not attend dialysis today as  brought her to the emergency department, he also noted patient had cloudy urine.  On review of medication reconciliation Lyrica was advised to be on pause at recent discharge but  states he is continue to give her once daily 75 mg dose.  He denies that patient has had bouts of hypoglycemia and/or hypotension.  She has not been on any outpatient antibiotics.    ED Treatment: 500c normal saline.     Overview/Hospital Course:  Encephalopathy. UA reflexed to culture, started on empiric ertapenem although ID recommends looking for alternate source of mentation change. Lyrica has been discontinued and discussed with patient's significant other. Nephrology consulted for PD/HD status.     Interval History: Encephalopathy remains. UA reflexed to culture, started on empiric ertapenem although ID recommends looking for alternate source of  mentation change. Lyrica has been discontinued and discussed with patient's significant other. Nephrology consulted for PD/HD status. Denies abdominal pain, n/v, diarrhae.     Review of Systems  Objective:     Vital Signs (Most Recent):  Temp: 98.6 °F (37 °C) (07/19/25 0732)  Pulse: 75 (07/19/25 1115)  Resp: 16 (07/19/25 0732)  BP: (!) 120/53 (07/19/25 0732)  SpO2: 98 % (07/19/25 0732) Vital Signs (24h Range):  Temp:  [97.6 °F (36.4 °C)-98.6 °F (37 °C)] 98.6 °F (37 °C)  Pulse:  [74-88] 75  Resp:  [16-20] 16  SpO2:  [95 %-100 %] 98 %  BP: (106-125)/(49-58) 120/53     Weight: 60.5 kg (133 lb 6.1 oz)  Body mass index is 26.05 kg/m².    Intake/Output Summary (Last 24 hours) at 7/19/2025 1245  Last data filed at 7/19/2025 0030  Gross per 24 hour   Intake 970 ml   Output --   Net 970 ml         Physical Exam      Constitutional:       General: She is not in acute distress.  Eyes:      General: No scleral icterus.  Cardiovascular:      Rate and Rhythm: Normal rate and regular rhythm.   Pulmonary:      Effort: Pulmonary effort is normal. No respiratory distress.      Breath sounds: No wheezing.   Abdominal:      General: There is no distension.      Palpations: Abdomen is soft.      Tenderness: There is no abdominal tenderness.   PD catheter c/d/i  Musculoskeletal:      Comments: S/p bilateral AKA - no pitting edema in thighs   Neurological:      Comments: alert, answering questions, confused    Significant Labs: All pertinent labs within the past 24 hours have been reviewed.    Significant Imaging: I have reviewed all pertinent imaging results/findings within the past 24 hours.      Assessment & Plan  Encephalopathy  Urinary tract infection due to ESBL Klebsiella  -patient again has high pyuria - urine gram stain and culture are pending - she was recently treated with ertapenem.    -start empiric erta pending cx  -hold lyrica at this time   -per last PCP note in March 2025 patient has had ongoing cognitive & memory issues,  vascular dementia on problem list, refer for neurocognitive testing on discharge.   ESRD (end stage renal disease)  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, on chronic dialysis  -consult nephrology  -no acute BUN elevation  -continue cinacalcet, sevelamer for CKD-MBD  -continue sodium bicarbonte.     Discuss if patient needs PD fluid sampled to rule out PD peritonitis as a source of encephalopathy, no abd tenderness  Coronary artery disease involving coronary bypass graft with angina pectoris  Patient with known CAD s/p CABG, which is controlled Will continue Plavix and Statin and monitor for S/Sx of angina/ACS. Continue to monitor on telemetry.   Paroxysmal atrial fibrillation  Patient has paroxysmal (<7 days) atrial fibrillation. Patient is currently in sinus rhythm. [id number] Score: 3. The patients heart rate in the last 24 hours is as follows:  Pulse  Min: 74  Max: 88     Antiarrhythmics       Anticoagulants  apixaban tablet 5 mg, 2 times daily, Oral    Plan  - Replete lytes with a goal of K>4, Mg >2  - Patient is anticoagulated, see medications listed above.  - Patient's afib is currently controlled  - continue carvedilol.       Primary hypertension  Chronic combined systolic and diastolic congestive heart failure  Patient's blood pressure range in the last 24 hours was: BP  Min: 106/49  Max: 137/59.The patient's inpatient anti-hypertensive regimen is listed below:  Current Antihypertensives  furosemide tablet 40 mg, 2 times daily, Oral    Plan  - Patient presented with hypotension today s/p 500cc IV fluids in ED - normotensive tonight   -will continue coreg, furosemide.  Holding - entresto, hydrlazine and isosorbide - add back as clinically appropriate.   Anemia due to chronic kidney disease, on chronic dialysis  Chronic and stable  Type 2 diabetes mellitus with hyperglycemia, with long-term current use of insulin  Patient's FSGs are controlled on current  medication regimen.  Last A1c reviewed-   Lab Results   Component Value Date    HGBA1C 6.2 (H) 07/03/2025     Most recent fingerstick glucose reviewed-   Recent Labs   Lab 07/18/25  2219 07/19/25  0735 07/19/25  1145   POCTGLUCOSE 298* 218* 221*     Current correctional scale  Low  Maintain anti-hyperglycemic dose as follows-   Antihyperglycemics (From admission, onward)      Start     Stop Route Frequency Ordered    07/18/25 2227  insulin aspart U-100 pen 0-5 Units         -- SubQ Before meals & nightly PRN 07/18/25 2127          Conservative dosing of insulin during admission   Debility  Patient with 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 The patient's latest AMPAC (Activity Measure for Post Acute Care) Score is listed below.    AM-PAC Score - How much help does the patient need for each activity listed  Basic Mobility Total Score: 18  Turning over in bed (including adjusting bedclothes, sheets and blankets)?: A little  Sitting down on and standing up from a chair with arms (e.g., wheelchair, bedside commode, etc.): A little  Moving from lying on back to sitting on the side of the bed?: A little  Moving to and from a bed to a chair (including a wheelchair)?: A little  Need to walk in hospital room?: A little  Climbing 3-5 steps with a railing?: A little    Plan  - Progressive mobility protocol initated  - Fall precaution  CAROLIN (generalized anxiety disorder)  Continue home sertraline. Nightly trazodone.   S/P AKA (above knee amputation) bilateral  No edema/pain/ulceration noted.   VTE Risk Mitigation (From admission, onward)           Ordered     apixaban tablet 5 mg  2 times daily         07/18/25 2126                    Discharge Planning   DEVAN: 7/21/2025     Code Status: Full Code   Medical Readiness for Discharge Date:                            Frances Bee MD  Department of Hospital Medicine   [facility] - Observation 11H

## 2025-07-19 NOTE — ASSESSMENT & PLAN NOTE
ESRD on iHD MWF; transitioning to [employer] and currently undergoing training as an OP  [employer]    [place] [facility] [place] with Klebsiella ESBL UTI, completed abx course of Ertapenem x 5 days on 7/12.  Also was on IP Abx for suspected peritonitis with Vanc/Ceftaz (Abx completed upon discharge)    7/3:  Cell counts 222; SEGs 32%  7/7:  Cell counts 16; SEGs 1%  PD Cx NGTD    Plan/Recommendations:  -We will plan to instill 1L of 1.5% dextrose to dwell x 2 hours  -will collect Cell counts, Cx (including fungal) with gram stain from this sample for analysis  -please continue on Anti-fungal prophylaxis during the duration of her Abx treatment  -we will plan for iHD today and continue 3x week dailysis while IP  -will f/u with fluid analysis for PD fluid  -place on renal diet, will add protein supplements with her meals  -continue Miralax while IP to avoid constipation    Anemia of ESRD  -@ goal;  will continue trending    Bone mineral management  -continue cinacalcet  -continue phos binders

## 2025-07-19 NOTE — ASSESSMENT & PLAN NOTE
Consult nephrology in AM   -no acute BUN elevation  -continue cinacalcet, sevelamer for CKD-MBD  -continue sodium bicarbonte.     Discuss if patient needs PD fluid sampled to rule out PD peritonitis as a source of encephalopathy, no acute abdominal pain complaints tonight.

## 2025-07-19 NOTE — NURSING
Dialysis tx started to the left chest perm cath per orders placed by ANDREA Rai:    Order Questions    Question Answer   Antibiotics on HD? No   Duration of Treatment 3 hours   Dialyzer F160NR   Dialysate Temperature (C) 37   Target  mL/min   If unable to maintain flow due to inadequate vascular access patency, patient intolerance (i.e. chest pain, access discomfort) or elevated venous pressure, adjust blood flow rate to a minimum of _____mL/min 100    mL/min   K+ 2 MEQ/L   Ca++ 2.5 MEQ/L   Na+ 140 MEQ/L   Bicarb 25 meq   Access to be used Other (please specify)   Target UF 1L   If unable to maintain this UFR due to patient intolerance (i.e. hypotension, chest pain, muscle cramping, nausea or vomiting), adjust UFR to achieve a minimum of _______ liters of UF 0   Fluid Removal Instructions maintain SBP > 100 mmHG

## 2025-07-19 NOTE — PROGRESS NOTES
Pharmacist Renal Dose Adjustment Note    Suyapa Connelly is a 59 y.o. female being treated with the medication ertapenem    Patient Data:    Vital Signs (Most Recent):  Temp: 98.6 °F (37 °C) (07/19/25 0732)  Pulse: 75 (07/19/25 1115)  Resp: 16 (07/19/25 0732)  BP: (!) 120/53 (07/19/25 0732)  SpO2: 98 % (07/19/25 0732) Vital Signs (72h Range):  Temp:  [97.6 °F (36.4 °C)-98.6 °F (37 °C)]   Pulse:  [74-88]   Resp:  [16-20]   BP: ()/(47-58)   SpO2:  [95 %-100 %]      Recent Labs   Lab 07/18/25  1214   CREATININE 4.8*     Serum creatinine: 4.8 mg/dL (H) 07/18/25 1214  Estimated creatinine clearance: 10.4 mL/min (A)    Ertapenem 1 g Q24H changed to 500 mg Q24H @ 1700 (given after dialysis when dose falls on dialysis days)    Pharmacist's Name: Fern Malik  Pharmacist's Extension: [phone]

## 2025-07-19 NOTE — ASSESSMENT & PLAN NOTE
Patient with 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 The patient's latest AMPAC (Activity Measure for Post Acute Care) Score is listed below.    AM-PAC Score - How much help does the patient need for each activity listed  Basic Mobility Total Score: 18  Turning over in bed (including adjusting bedclothes, sheets and blankets)?: A little  Sitting down on and standing up from a chair with arms (e.g., wheelchair, bedside commode, etc.): A little  Moving from lying on back to sitting on the side of the bed?: A little  Moving to and from a bed to a chair (including a wheelchair)?: A little  Need to walk in hospital room?: A little  Climbing 3-5 steps with a railing?: A little    Plan  - Progressive mobility protocol initated  - Fall precaution

## 2025-07-19 NOTE — ASSESSMENT & PLAN NOTE
-consult nephrology  -no acute BUN elevation  -continue cinacalcet, sevelamer for CKD-MBD  -continue sodium bicarbonte.     Discuss if patient needs PD fluid sampled to rule out PD peritonitis as a source of encephalopathy, no abd tenderness

## 2025-07-19 NOTE — HPI
Suyapa Connelly is a 58 yo female with hx of ESRD currently on iHD MWF (Transitioning to PD, currently in training), DM2, HTN, HLD, PAD s/p bilateral AKA, CAD, HFrEF, and pAF who presents with  to the ED on 7/18 with chief complaint of weakness, fatigue, and confusion.  Information obtained from records review and  at bedside.  She [place] [facility] [place] and found to have a UTI with cx + for Klebsiella ESBL.  She underwent Abx therapy with Ertapenem x 5 days with last dose on 7/12.  Her symptoms have not improved, and this is the reason for admission this time.  During the last admission, PD fluid analysis was obtained on 7/3 noting a WBC of 222; SEGs 32%.  She was started on IP Vanc/Ceftaz that was then discontinued on discharge.  PD cultures negative to date.  A repeat PD fluid analysis done on 7/7 revealed a WBC of 16 with SEGs of 1%.  She denies any abdominal complaints on exam today, awake and able to respond to questions but does appear to be lethargic.  denies any fevers at home.  No PD training was completed this week and she was undergoing regular iHD on Monday and Wednesday but has missed her HD appointment on Friday as she presented to the hospital.  UCx pending, she has been restarted on Ertapenem along with fluconazole for fungal PD prophylaxis.  Nephrology is consulted for ESRD management while IP.

## 2025-07-19 NOTE — ASSESSMENT & PLAN NOTE
Patient's blood pressure range in the last 24 hours was: BP  Min: 106/49  Max: 137/59.The patient's inpatient anti-hypertensive regimen is listed below:  Current Antihypertensives  furosemide tablet 40 mg, 2 times daily, Oral    Plan  - Patient presented with hypotension today s/p 500cc IV fluids in ED - normotensive tonight   -will continue coreg, furosemide.  Holding - entresto, hydrlazine and isosorbide - add back as clinically appropriate.

## 2025-07-19 NOTE — CONSULTS
[facility] - Observation 11H    Wound Care     Patient Name:  Suyapa Connelly  MRN:  [id number]  Date: 7/19/2025  Diagnosis: Encephalopathy     History:  Past Medical History:   Diagnosis Date    Acute myocardial infarction, unspecified 01/10/2024    Anaphylactic shock, unspecified, initial encounter 01/10/2024    Anxiety     Chronic pain syndrome     CKD (chronic kidney disease), stage III     CVD (cardiovascular disease)     Depression     Diabetes mellitus, type 2     GERD (gastroesophageal reflux disease)     Hyperemesis 03/23/2021    Hypokalemia 03/23/2021    Infection of below knee amputation stump 03/12/2022    Osteomyelitis     Osteomyelitis of left foot 04/30/2021    Pressure injury of sacral region, unstageable 05/14/2024    Ulcer of left foot     Vaginal delivery     x1    Vascular graft infection 06/01/2021     Social History[1]  Precautions:  Allergies as of 07/18/2025 - Reviewed 07/18/2025   Allergen Reaction Noted    Ciprofloxacin Itching 07/18/2020    Pcn [penicillins]  11/14/2014       WOC Assessment Details / Treatment:    Patient seen for wound care: New Consult   Chart reviewed for this encounter.   Labs:   WBC (K/uL)   Date Value   07/18/2025 5.78   07/11/2025 6.58     Glucose (mg/dL)   Date Value   07/18/2025 197 (H)   07/11/2025 153 (H)   02/12/2025 143 (H)   02/11/2025 113 (H)     Albumin (g/dL)   Date Value   07/18/2025 2.7 (L)   07/11/2025 2.4 (L)   02/12/2025 2.8 (L)   02/11/2025 3.1 (L)       Carlos Score: 16    Narrative:  Pt seen for WC consultation and agreed to assessment. Pt awake and alert lying in bed.  at bedside. Turns with minimum assist.     Chart reviewed for this encounter.   See Flow Sheet for additional documentation and media.    Left buttocks: Removed previous foam border. Cleansed with bath wipe. Partial thickness skin loss noted with pink moist wound bed. Scarring to rico wound. Triad applied          RECOMMENDATIONS:  Bedside nurse assess for acute changes  (purulence, increased redness/swelling, increased drainage, malodor, increased pain, pallor, necrosis) please contact physician on any acute changes.    Left buttock: cleanse with bath wipe, apply triad BID and PRN soiling.    -Turn Q2   -Bed waffle       Bedside nursing to continue care, dressing changes, & continue monitoring.  Bedside nursing to maintain pressure injury prevention interventions, (PIP).     Recommendations made to primary team for above plan.    Thank you for the consult.          07/19/25 1015        Wound 07/18/25 1640 Skin Tear Left Buttocks #1   Date First Assessed/Time First Assessed: 07/18/25 1640   Primary Wound Type: (c) Skin Tear  Side: Left  Location: Buttocks  Wound Number: #1   Wound Image    Dressing Appearance Dry;Intact;Moist drainage   Drainage Amount Scant   Drainage Characteristics/Odor Serosanguineous   Appearance Pink;Moist   Tissue loss description Partial thickness   Periwound Area Scar tissue   Care Cleansed with:;Other (see comments)  (bath wipe)   Dressing Removed;Foam;Applied;Other (comment)  (triad)                          [1]   Social History  Socioeconomic History    Marital status:    Occupational History    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      Social Drivers of Health     Financial Resource Strain: Low Risk  (7/19/2025)    Overall Financial Resource Strain (CARDIA)     Difficulty of Paying Living Expenses: Not very hard   Food Insecurity: No Food Insecurity (7/19/2025)    Hunger Vital Sign     Worried About Running Out of Food in the Last Year: Never true     Ran Out of Food in the Last Year: Never true   Transportation Needs: No Transportation Needs (7/19/2025)     PRAPARE - Transportation     Lack of Transportation (Medical): No     Lack of Transportation (Non-Medical): No   Physical Activity: Inactive (7/4/2025)    Exercise Vital Sign     Days of Exercise per Week: 0 days     Minutes of Exercise per Session: 0 min   Stress: No Stress Concern Present (7/19/2025)    [employer] - Occupational Stress Questionnaire     Feeling of Stress : Only a little   Housing Stability: Low Risk  (7/19/2025)    Housing Stability Vital Sign     Unable to Pay for Housing in the Last Year: No     Number of Times Moved in the Last Year: 0     Homeless in the Last Year: No

## 2025-07-19 NOTE — ASSESSMENT & PLAN NOTE
Patient has paroxysmal (<7 days) atrial fibrillation. Patient is currently in sinus rhythm. [id number] Score: 3. The patients heart rate in the last 24 hours is as follows:  Pulse  Min: 74  Max: 88     Antiarrhythmics       Anticoagulants  apixaban tablet 5 mg, 2 times daily, Oral    Plan  - Replete lytes with a goal of K>4, Mg >2  - Patient is anticoagulated, see medications listed above.  - Patient's afib is currently controlled  - continue carvedilol.

## 2025-07-19 NOTE — ASSESSMENT & PLAN NOTE
Patient's FSGs are controlled on current medication regimen.  Last A1c reviewed-   Lab Results   Component Value Date    HGBA1C 6.2 (H) 07/03/2025     Most recent fingerstick glucose reviewed-   Recent Labs   Lab 07/18/25  2219 07/19/25  0735 07/19/25  1145   POCTGLUCOSE 298* 218* 221*     Current correctional scale  Low  Maintain anti-hyperglycemic dose as follows-   Antihyperglycemics (From admission, onward)      Start     Stop Route Frequency Ordered    07/18/25 2227  insulin aspart U-100 pen 0-5 Units         -- SubQ Before meals & nightly PRN 07/18/25 2127          Conservative dosing of insulin during admission

## 2025-07-19 NOTE — HOSPITAL COURSE
Encephalopathy. UA reflexed to culture, started on empiric ertapenem although ID recommends looking for alternate source of mentation change. Lyrica has been discontinued and discussed with patient's significant other. Nephrology consulted for PD/HD status. PD cell count, gram stain, and cx obtained which did not reflect e/o bacterial peritonitis. UCx only grew a few candida which is colonization. Ertapenem discontinued. Mentation has improved. Consider hypotension as etiology as all of her home meds were held here with low-normal BP's - recommended holding these at this time with close OP BP monitoring. Referral for Neurology placed. She will f/u with PCP and Nephro as well.

## 2025-07-19 NOTE — SUBJECTIVE & OBJECTIVE
Past Medical History:   Diagnosis Date    Acute myocardial infarction, unspecified 01/10/2024    Anaphylactic shock, unspecified, initial encounter 01/10/2024    Anxiety     Chronic pain syndrome     CKD (chronic kidney disease), stage III     CVD (cardiovascular disease)     Depression     Diabetes mellitus, type 2     GERD (gastroesophageal reflux disease)     Hyperemesis 03/23/2021    Hypokalemia 03/23/2021    Infection of below knee amputation stump 03/12/2022    Osteomyelitis     Osteomyelitis of left foot 04/30/2021    Ulcer of left foot     Vaginal delivery     x1       Past Surgical History:   Procedure Laterality Date    ABOVE-KNEE AMPUTATION Left 5/18/2021    Procedure: AMPUTATION, ABOVE KNEE;  Surgeon: Teddy Huber MD;  Location: [facility] OR [address] [place] [address];  Service: Vascular;  Laterality: Left;    ABOVE-KNEE AMPUTATION Right 3/18/2022    Procedure: AMPUTATION, ABOVE KNEE;  Surgeon: DAYNE Florez II, MD;  Location: [address];  Service: Vascular;  Laterality: Right;    Angiogram - Right Extremity Right 7/9/15    ANGIOGRAM, EXTREMITY, UNILATERAL Left 11/22/2024    Procedure: ANGIOGRAM, EXTREMITY, UNILATERAL;  Surgeon: Yony Lindsey MD;  Location: [address];  Service: Vascular;  Laterality: Left;  Left leg angio, L CFA approach  5.4 min  597.31 mGy  73.8959 Gycm2  20 ml dye    angiogram-left leg  10/6/15    ANGIOGRAPHY OF LOWER EXTREMITY Left 4/29/2021    Procedure: ANGIOGRAM, LOWER EXTREMITY;  Surgeon: Teddy Huber MD;  Location: [address];  Service: Vascular;  Laterality: Left;    BELOW KNEE AMPUTATION OF LOWER EXTREMITY Right 12/28/2021    Procedure: AMPUTATION, BELOW KNEE;  Surgeon: Kaitlyn Rojas MD;  Location: [facility] OR;  Service: General;  Laterality: Right;    CATHETERIZATION OF BOTH LEFT AND RIGHT HEART N/A 12/18/2019    Procedure: CATHETERIZATION, HEART, BOTH LEFT AND RIGHT;  Surgeon: Que Fernando III, MD;  Location: [facility] CATH LAB;  Service:  Cardiology;  Laterality: N/A;    CORONARY ANGIOGRAPHY N/A 12/18/2019    Procedure: ANGIOGRAM, CORONARY ARTERY;  Surgeon: Que Fernando III, MD;  Location: [facility] CATH LAB;  Service: Cardiology;  Laterality: N/A;    CORONARY ANGIOGRAPHY INCLUDING BYPASS GRAFTS WITH CATHETERIZATION OF LEFT HEART N/A 7/28/2020    Procedure: ANGIOGRAM, CORONARY, INCLUDING BYPASS GRAFT, WITH LEFT HEART CATHETERIZATION, 9 am;  Surgeon: Rachel Easley MD;  Location: [facility] CATH LAB;  Service: Cardiology;  Laterality: N/A;    CORONARY ARTERY BYPASS GRAFT (CABG) N/A 1/14/2020    Procedure: CORONARY ARTERY BYPASS GRAFT (CABG) x 1     Off Pump;  Surgeon: Huang Altamirano MD;  Location: [facility] OR [address] [place] [address];  Service: Cardiovascular;  Laterality: N/A;    CREATION OF FEMORAL-TIBIAL ARTERY BYPASS Left 4/29/2021    Procedure: CREATION, BYPASS, ARTERIAL, FEMORAL TO ANTERIOR TIBIAL;  Surgeon: Teddy Huber MD;  Location: [facility] OR [address] [place] [address];  Service: Vascular;  Laterality: Left;    CREATION OF FEMOROPOPLITEAL ARTERIAL BYPASS USING GRAFT Left 8/18/2020    Procedure: CREATION, BYPASS, ARTERIAL, FEMORAL TO POPLITEAL, USING GRAFT, LEFT LOWER EXTREMITY;  Surgeon: Teddy Huber MD;  Location: [facility] OR;  Service: Vascular;  Laterality: Left;  REQUEST 7:15 A.M. START----COVID NEGATIVE ON 8/17  1ST CASE STARTKENYA DUEÑAS ON 8/7/2020 @ 942AM-LO  RN PREOP 8/12/2020   T/S-----CLEARED BY CARDS-------PENDING INSURANCE    DEBRIDEMENT OF FOOT Left 9/8/2020    Procedure: DEBRIDEMENT, FOOT;  Surgeon: Rosio Mayes DPM;  Location: [facility] OR;  Service: Podiatry;  Laterality: Left;  request neoxx .   RN Pre Op 9-4-2020, Covid negative on 9/5/20. C A    DEBRIDEMENT OF FOOT  3/4/2021    Procedure: DEBRIDEMENT, FOOT;  Surgeon: Teddy Huber MD;  Location: [facility] OR;  Service: Vascular;;    DEBRIDEMENT OF FOOT Left 3/9/2021    Procedure: DEBRIDEMENT, FOOT, bone biopsy;  Surgeon: Rosio Mayes DPM;  Location: [facility] OR;  Service: Podiatry;  Laterality: Left;   Request neoxx---COVID IN AM  REQUESTING NOON START  RN Phone Pre op.On Blood thinners Plavix and Eliquis.  Covid am of surgery. C A    DEBRIDEMENT OF FOOT Left 5/4/2021    Procedure: DEBRIDEMENT, FOOT;  Surgeon: Farooq Morley DPM;  Location: [facility] OR 2ND FLR;  Service: Podiatry;  Laterality: Left;    ESOPHAGOGASTRODUODENOSCOPY N/A 11/7/2024    Procedure: EGD (ESOPHAGOGASTRODUODENOSCOPY);  Surgeon: Yony Cancino MD;  Location: [facility] ENDO;  Service: Endoscopy;  Laterality: N/A;    INSERTION OF TUNNELED CENTRAL VENOUS HEMODIALYSIS CATHETER N/A 1/27/2020    Procedure: Insertion, Catheter, Central Venous, Hemodialysis;  Surgeon: ESTEBAN Gomez III, MD;  Location: [facility] CATH LAB;  Service: Peripheral Vascular;  Laterality: N/A;    INSERTION OF TUNNELED CENTRAL VENOUS HEMODIALYSIS CATHETER  5/10/2023    Procedure: Insertion, Catheter, Central Venous, Hemodialysis;  Surgeon: Romulo Queen MD;  Location: [facility] CATH LAB;  Service: Cardiology;;    INSERTION, CATHETER, DIALYSIS, PERITONEAL, LAPAROSCOPIC N/A 6/3/2025    Procedure: INSERTION, CATHETER, DIALYSIS, PERITONEAL, LAPAROSCOPIC;  Surgeon: Kvng Tai MD;  Location: [facility] OR;  Service: General;  Laterality: N/A;    IRRIGATION AND DEBRIDEMENT OF LOWER EXTREMITY Left 11/22/2024    Procedure: IRRIGATION AND DEBRIDEMENT, LOWER EXTREMITY;  Surgeon: Yony Lindsey MD;  [address];  Service: Vascular;  Laterality: Left;    MAGNETIC RESONANCE IMAGING Left 11/19/2024    Procedure: MRI (Magnetic Resonance Imagine);  Surgeon: Sophia Hernandez;  Location: [facility] SOPHIA;  Service: Anesthesiology;  Laterality: Left;    PERCUTANEOUS TRANSLUMINAL ANGIOPLASTY N/A 3/4/2021    Procedure: PTA (ANGIOPLASTY, PERCUTANEOUS, TRANSLUMINAL);  Surgeon: Teddy Huber MD;  Location: [facility] OR;  Service: Vascular;  Laterality: N/A;    REMOVAL OF ARTERIOVENOUS GRAFT Left 5/27/2021    Procedure: REMOVAL, GRAFT, LEFT LOWER EXTREMITY, WOUND EXPLORATION;   Surgeon: Teddy Huber MD;  Location: [address] FLR;  Service: Vascular;  Laterality: Left;    REMOVAL OF NAIL OF DIGIT Left 3/9/2021    Procedure: REMOVAL, NAIL, DIGIT;  Surgeon: Rosio Mayes DPM;  Location: [facility] OR;  Service: Podiatry;  Laterality: Left;    RIGHT HEART CATHETERIZATION Right 5/10/2023    Procedure: INSERTION, CATHETER, RIGHT HEART;  Surgeon: Romulo Queen MD;  Location: [facility] CATH LAB;  Service: Cardiology;  Laterality: Right;    STENT, ILIAC ARTERY Left 11/22/2024    Procedure: STENT, ILIAC ARTERY;  Surgeon: Yony Lindsey MD;  Location: [facility] OR [facility] FLR;  Service: Vascular;  Laterality: Left;    THROMBECTOMY Left 3/4/2021    Procedure: THROMBECTOMY, LEFT LOWER EXTREMITY BYPASS GRAFT, ANGIOGRAM, POSSIBLE INTERVENTION, POSSIBLE LEFT LOWER EXTREMITY BYPASS;  Surgeon: Teddy Huber MD;  Location: [facility] OR;  Service: Vascular;  Laterality: Left;    THROMBECTOMY Left 4/29/2021    Procedure: GRAFT THROMBECTOMY, LEFT LOWER EXTREMITY;  Surgeon: Teddy Huber MD;  [address];  Service: Vascular;  Laterality: Left;  14.5 min  1179.85 mGy  341.01 Gycm2  240 ml dye    THROMBECTOMY  10/22/2021    Procedure: THROMBECTOMY;  Surgeon: Saad Arenas MD;  Location: [facility] CATH LAB/EP;  Service: Cardiology;;       Review of patient's allergies indicates:   Allergen Reactions    Ciprofloxacin Itching    Pcn [penicillins]      Rash; tolerated ceftriaxone on 1/13/20  Tolerating Augmentin November 2024       No current facility-administered medications on file prior to encounter.     Current Outpatient Medications on File Prior to Encounter   Medication Sig    acetaminophen (TYLENOL) 325 MG tablet Take 2 tablets (650 mg total) by mouth every 8 (eight) hours as needed for Pain.    allopurinoL (ZYLOPRIM) 100 MG tablet TAKE 1/2 TABLET(50 MG) BY MOUTH EVERY OTHER DAY    apixaban (ELIQUIS) 5 mg Tab Take 1 tablet (5 mg total) by mouth 2 (two) times daily.    atorvastatin  (LIPITOR) 80 MG tablet Take 1 tablet (80 mg total) by mouth once daily.    blood sugar diagnostic Strp 1 each by Misc.(Non-Drug; Combo Route) route 4 (four) times daily before meals and nightly.    blood-glucose meter Misc Before meals and at bedtime    blood-glucose meter Misc 1 each by Misc.(Non-Drug; Combo Route) route 3 (three) times daily.    blood-glucose sensor (DEXCOM G7 SENSOR) Radha Apply device and replace every 10 days    [Paused] calcium acetate,phosphat bind, (PHOSLO) 667 mg tablet Take 1 tablet (667 mg total) by mouth 2 (two) times a day.    carvediloL (COREG) 6.25 MG tablet Take 1 tablet (6.25 mg total) by mouth 2 (two) times daily.    cinacalcet (SENSIPAR) 30 MG Tab Take 1 tablet (30 mg total) by mouth every Mon, Wed, Fri.    clopidogreL (PLAVIX) 75 mg tablet Take 1 tablet (75 mg total) by mouth once daily.    famotidine (PEPCID) 20 MG tablet Take 1 tablet (20 mg total) by mouth once daily.    furosemide (LASIX) 40 MG tablet Take 1 tablet (40 mg total) by mouth 2 (two) times daily.    gentamicin (GARAMYCIN) 0.1 % cream Apply topically once daily.    hydrALAZINE (APRESOLINE) 10 MG tablet Take 1 tablet (10 mg total) by mouth every 12 (twelve) hours.    insulin aspart U-100 (NOVOLOG FLEXPEN U-100 INSULIN) 100 unit/mL (3 mL) InPn pen Inject 3 Units into the skin 3 (three) times daily with meals.    insulin aspart U-100 (NOVOLOG) 100 unit/mL (3 mL) InPn pen Inject 5 Units into the skin 3 times daily with meals. **MODERATE CORRECTION DOSE**  151-200 mg/dL Pre-meal: 2 units 2200: 1 unit; 201-250 mg/dL Pre-meal 4 units 2200: 2 units; 251-300mg/dL Pre-meal 6 units 2200: 3 units; 301-350mg/dL Pre-meal 8 units 2200: 4 units; >350mg/dL Pre-meal 10 U 2200: 5 U    insulin glargine U-100, Lantus, 100 unit/mL (3 mL) SubQ InPn pen Inject 5 Units into the skin every evening.    isosorbide dinitrate (ISORDIL) 10 MG tablet Take 0.5 tablets (5 mg total) by mouth 2 (two) times daily.    lancets 33 gauge Misc TEST 3 TIMES  "DAILY    multivit with min-folic acid (ONE DAILY WOMENS 50 PLUS) 0.4 mg Tab Take 1 tablet by mouth once daily.    mupirocin (BACTROBAN) 2 % ointment Apply topically 2 (two) times daily.    OZEMPIC 0.25 mg or 0.5 mg (2 mg/3 mL) pen injector Inject 0.5 mg into the skin every 7 days.    pen needle, diabetic (BD MIGUEL 2ND GEN PEN NEEDLE) 32 gauge x 5/32" Ndle Use to inject insulin once daily    pen needle, diabetic 33 gauge x 5/32" Ndle 1 each by Misc.(Non-Drug; Combo Route) route 4 (four) times daily before meals and nightly.    polyethylene glycol (GLYCOLAX) 17 gram/dose powder Mix 1 capful (17 g) with a liquid and take by mouth once daily.    [Paused] pregabalin (LYRICA) 75 MG capsule Take 1 capsule (75 mg total) by mouth Daily.    RENVELA 2.4 gram PwPk Take 1 packet by mouth 3 (three) times daily with meals.    sacubitriL-valsartan (ENTRESTO) 24-26 mg per tablet Take 1 tablet by mouth 2 (two) times daily.    senna-docusate (PERICOLACE) 8.6-50 mg per tablet Take 1 tablet by mouth 2 (two) times daily.    sertraline (ZOLOFT) 100 MG tablet Take 1 tablet (100 mg total) by mouth once daily.    sodium bicarbonate 650 MG tablet TAKE 1 TABLET(650 MG) BY MOUTH THREE TIMES DAILY    traZODone (DESYREL) 50 MG tablet TAKE 1/2 TABLET(25 MG) BY MOUTH EVERY EVENING    vit B,C-FA-zinc-selen-vit D3-E (RENAPLEX-D) 800 mcg-12.5 mg -2,000 unit Tab Take 1 tablet by mouth once daily.    zinc oxide 10 % Oint Apply 1 Application topically 2 (two) times a day.    [DISCONTINUED] lancing device Misc 1 Device by Misc.(Non-Drug; Combo Route) route 2 (two) times daily with meals.     Family History       Problem Relation (Age of Onset)    Diabetes Mother, Father, Paternal Grandmother    Heart disease Maternal Grandmother    No Known Problems Maternal Grandfather, Paternal Grandfather          Tobacco Use    Smoking status: Former     Passive exposure: Never    Smokeless tobacco: Never   Substance and Sexual Activity    Alcohol use: No    Drug use: " "Yes     Types: Marijuana     Comment: occassional    Sexual activity: Yes     Partners: Male     Review of Systems   Respiratory:  Negative for shortness of breath.    Cardiovascular:  Negative for leg swelling.   Gastrointestinal:  Negative for abdominal pain.   Genitourinary:  Negative for dysuria and frequency.   Psychiatric/Behavioral:  Positive for confusion.      Objective:     Vital Signs (Most Recent):  Temp: 98.2 °F (36.8 °C) (07/18/25 2315)  Pulse: 88 (07/18/25 2318)  Resp: 18 (07/18/25 2315)  BP: (!) 118/57 (07/18/25 2315)  SpO2: 100 % (07/18/25 2315) Vital Signs (24h Range):  Temp:  [97.6 °F (36.4 °C)-98.4 °F (36.9 °C)] 98.2 °F (36.8 °C)  Pulse:  [74-88] 88  Resp:  [18-20] 18  SpO2:  [95 %-100 %] 100 %  BP: ()/(47-58) 118/57     Weight: 60.5 kg (133 lb 6.1 oz)  Body mass index is 22.2 kg/m².     Physical Exam  Vitals and nursing note reviewed.   Constitutional:       General: She is not in acute distress.     Appearance: She is ill-appearing.   Eyes:      General: No scleral icterus.  Cardiovascular:      Rate and Rhythm: Normal rate and regular rhythm.   Pulmonary:      Effort: Pulmonary effort is normal. No respiratory distress.      Breath sounds: No wheezing.   Abdominal:      General: There is no distension.      Palpations: Abdomen is soft.      Tenderness: There is no abdominal tenderness.   Musculoskeletal:      Comments: S/p bilateral AKA - no pitting edema in thighs   Neurological:      Comments: somnolent                Significant Labs: All pertinent labs within the past 24 hours have been reviewed.  ABGs: No results for input(s): "PH", "PCO2", "HCO3", "POCSATURATED", "BE", "TOTALHB", "COHB", "METHB", "O2HB", "POCFIO2", "PO2" in the last 48 hours.  CBC:   Recent Labs   Lab 07/18/25  1214   WBC 5.78   HGB 10.2*   HCT 32.8*        CMP:   Recent Labs   Lab 07/18/25  1214      K 4.6      CO2 25   *   BUN 36*   CREATININE 4.8*   CALCIUM 7.8*   PROT 7.5   ALBUMIN " "2.7*   BILITOT 0.6   ALKPHOS 122   AST 20   ALT 7*   ANIONGAP 12     Cardiac Markers:   Recent Labs   Lab 07/18/25  1214   BNP 2,546*     Coagulation: No results for input(s): "PT", "INR", "APTT" in the last 48 hours.  Lactic Acid: No results for input(s): "LACTATE" in the last 48 hours.  Magnesium:   Recent Labs   Lab 07/18/25  1214   MG 2.4     Troponin:   Recent Labs   Lab 07/18/25  1214   TROPONINIHS 25*     Urine Studies:   Recent Labs   Lab 07/18/25  1713   COLORU Yellow   APPEARANCEUA Cloudy*   PHUR 7.0   SPECGRAV 1.010   PROTEINUA 2+*   GLUCUA Negative   BILIRUBINUA Negative   OCCULTUA 3+*   NITRITE Negative   UROBILINOGEN Negative   LEUKOCYTESUR 3+*   RBCUA 0   WBCUA >100*   BACTERIA Rare   HYALINECASTS 0       Significant Imaging: I have reviewed all pertinent imaging results/findings within the past 24 hours.    XR CHEST AP PORTABLE     CLINICAL HISTORY:  Sepsis;     TECHNIQUE:  Single frontal view of the chest was performed.     COMPARISON:  06/05/2025     FINDINGS:  Left-sided PermCath, sternotomy wires and surgical clips overlying the cardiomediastinal silhouette relatively stable.  No new lung opacity.  No large pleural effusion or pneumothorax.  Continued atherosclerotic aorta.  Further evaluation as warranted clinically.     Impression:     Please see above        Electronically signed by: Solomon Jauregui DO  Date:                                            07/18/2025  Time:                                           12:22  "

## 2025-07-19 NOTE — ASSESSMENT & PLAN NOTE
-patient again has high pyuria - urine gram stain and culture are pending - she was recently treated with ertapenem.  Antibiotics on hold at this time - would f/u gram stain and urine culture   -Hold lyrica at this time     -If urine culture growing bacteria - obtain repeat ID consult for empiric ESBL coverage.     -per last PCP note in March 2025 patient has had ongoing cognitive & memory issues, vascular dementia on problem list, refer for neurocognitive testing on discharge.

## 2025-07-19 NOTE — PLAN OF CARE
Manual PD completed. Pt line clamped and disconnected. Betadine cap placed aseptically, secured to abd with tape    -UF removed: 590 ml    -yellow effluent appearance     Labs from the peritoneal fluid collected

## 2025-07-19 NOTE — ASSESSMENT & PLAN NOTE
Patient's blood pressure range in the last 24 hours was: BP  Min: 94/47  Max: 125/53.The patient's inpatient anti-hypertensive regimen is listed below:  Current Antihypertensives  furosemide tablet 40 mg, 2 times daily, Oral  carvediloL tablet 6.25 mg, 2 times daily, Oral    Plan  - Patient presented with hypotension today s/p 500cc IV fluids in ED - normotensive tonight   -will continue carevedilol and furosemide.    Holding - entresto, hydrlazine and isosorbide - add back as clinically appropriate.

## 2025-07-19 NOTE — PLAN OF CARE
Problem: Hemodialysis  Goal: Safe, Effective Therapy Delivery  7/19/2025 1736 by Anais Camara RN  Outcome: Progressing  7/19/2025 1715 by Anais Camara RN  Outcome: Progressing  Goal: Effective Tissue Perfusion  7/19/2025 1736 by Anais Camara RN  Outcome: Progressing  7/19/2025 1715 by Anais Camara RN  Outcome: Progressing  Goal: Absence of Infection Signs and Symptoms  7/19/2025 1736 by Anais Camara RN  Outcome: Progressing  7/19/2025 1715 by Anais Camara RN  Outcome: Progressing          Hemodialysis tx ended to the left chest perm cath, heparin locked, capped, sterile dressing applied.    Net fluid removed: 955 mL    Report given to nurse Joe pt awaiting transport from [facility] to room 742 by stretcher.

## 2025-07-19 NOTE — H&P
[facility] - Observation [address] [place] [address] Medicine  History & Physical    Patient Name: Suyapa Connelly  MRN: [id number]  Patient Class: OP- Observation  Admission Date: 7/18/2025  Attending Physician: Frances Bee MD [facility]   Admitting Physician: Andrei Cobb MD  Primary Care Provider: Vanessa Noel MD      Patient information was obtained from spouse/SO, past medical records, and ER records.     Subjective:     Principal Problem:Encephalopathy    Chief Complaint:   Chief Complaint   Patient presents with    Urinary Tract Infection     Dc'd sat for uti, still not feeling better, dialysis,         HPI: 59-year-old woman with ESRD on HD, undergoing PD training, CAD status post CABG, P AFib, status post bilateral AKA presents to the emergency department for concerns of confusion.      She was recently admitted to [facility] from July 3rd to July 12th, diagnosed with ESBL Klebsiella UTI, seen by infectious diseases and was treated with 5 days of ertapenem inpatient, discharge summary indicates her mental status was at baseline at discharge.  Tonight her  reports that she has not had resolution of mental status change or confusion.   provides majority of history.    He reports her last PT session was July 11th, she had ambulatory hemodialysis on July 14th and July 16th, did not attend dialysis today as  brought her to the emergency department, he also noted patient had cloudy urine.  On review of medication reconciliation Lyrica was advised to be on pause at recent discharge but  states he is continue to give her once daily 75 mg dose.  He denies that patient has had bouts of hypoglycemia and/or hypotension.  She has not been on any outpatient antibiotics.    ED Treatment: 500c normal saline.     Past Medical History:   Diagnosis Date    Acute myocardial infarction, unspecified 01/10/2024    Anaphylactic shock, unspecified, initial encounter 01/10/2024    Anxiety      Chronic pain syndrome     CKD (chronic kidney disease), stage III     CVD (cardiovascular disease)     Depression     Diabetes mellitus, type 2     GERD (gastroesophageal reflux disease)     Hyperemesis 03/23/2021    Hypokalemia 03/23/2021    Infection of below knee amputation stump 03/12/2022    Osteomyelitis     Osteomyelitis of left foot 04/30/2021    Ulcer of left foot     Vaginal delivery     x1       Past Surgical History:   Procedure Laterality Date    ABOVE-KNEE AMPUTATION Left 5/18/2021    Procedure: AMPUTATION, ABOVE KNEE;  Surgeon: Teddy Huber MD;  Location: [facility] OR [address] [place] [address];  Service: Vascular;  Laterality: Left;    ABOVE-KNEE AMPUTATION Right 3/18/2022    Procedure: AMPUTATION, ABOVE KNEE;  Surgeon: DAYNE Florez II, MD;  Location: [facility] OR [address] [place] [address];  Service: Vascular;  Laterality: Right;    Angiogram - Right Extremity Right 7/9/15    ANGIOGRAM, EXTREMITY, UNILATERAL Left 11/22/2024    Procedure: ANGIOGRAM, EXTREMITY, UNILATERAL;  Surgeon: Yony Lindsey MD;  Location: [facility] OR [address] [place] [address];  Service: Vascular;  Laterality: Left;  Left leg angio, L CFA approach  5.4 min  597.31 mGy  73.8959 Gycm2  20 ml dye    angiogram-left leg  10/6/15    ANGIOGRAPHY OF LOWER EXTREMITY Left 4/29/2021    Procedure: ANGIOGRAM, LOWER EXTREMITY;  Surgeon: Teddy Huber MD;  Location: [facility] OR [address] [place] [address];  Service: Vascular;  Laterality: Left;    BELOW KNEE AMPUTATION OF LOWER EXTREMITY Right 12/28/2021    Procedure: AMPUTATION, BELOW KNEE;  Surgeon: Kaitlyn Rojas MD;  Location: [facility] OR;  Service: General;  Laterality: Right;    CATHETERIZATION OF BOTH LEFT AND RIGHT HEART N/A 12/18/2019    Procedure: CATHETERIZATION, HEART, BOTH LEFT AND RIGHT;  Surgeon: Que Fernanod III, MD;  Location: [facility] CATH LAB;  Service: Cardiology;  Laterality: N/A;    CORONARY ANGIOGRAPHY N/A 12/18/2019    Procedure: ANGIOGRAM, CORONARY ARTERY;  Surgeon: Que Fernando III, MD;  Location: [facility] CATH LAB;   Service: Cardiology;  Laterality: N/A;    CORONARY ANGIOGRAPHY INCLUDING BYPASS GRAFTS WITH CATHETERIZATION OF LEFT HEART N/A 7/28/2020    Procedure: ANGIOGRAM, CORONARY, INCLUDING BYPASS GRAFT, WITH LEFT HEART CATHETERIZATION, 9 am;  Surgeon: Rachel Easley MD;  Location: [facility] CATH LAB;  Service: Cardiology;  Laterality: N/A;    CORONARY ARTERY BYPASS GRAFT (CABG) N/A 1/14/2020    Procedure: CORONARY ARTERY BYPASS GRAFT (CABG) x 1     Off Pump;  Surgeon: Huang Altamirano MD;  Location: [facility] OR [address] [place] [address];  Service: Cardiovascular;  Laterality: N/A;    CREATION OF FEMORAL-TIBIAL ARTERY BYPASS Left 4/29/2021    Procedure: CREATION, BYPASS, ARTERIAL, FEMORAL TO ANTERIOR TIBIAL;  Surgeon: Teddy Huber MD;  Location: [facility] OR [address] [place] [address];  Service: Vascular;  Laterality: Left;    CREATION OF FEMOROPOPLITEAL ARTERIAL BYPASS USING GRAFT Left 8/18/2020    Procedure: CREATION, BYPASS, ARTERIAL, FEMORAL TO POPLITEAL, USING GRAFT, LEFT LOWER EXTREMITY;  Surgeon: Teddy Huber MD;  Location: [facility] OR;  Service: Vascular;  Laterality: Left;  REQUEST 7:15 A.M. START----COVID NEGATIVE ON 8/17  1ST CASE STARTE PER LEANA ON 8/7/2020 @ 942AM-  RN PREOP 8/12/2020   T/S-----CLEARED BY CARDS-------PENDING INSURANCE    DEBRIDEMENT OF FOOT Left 9/8/2020    Procedure: DEBRIDEMENT, FOOT;  Surgeon: Rosio Mayes DPM;  Location: [facility] OR;  Service: Podiatry;  Laterality: Left;  request neoxx .   RN Pre Op 9-4-2020, Covid negative on 9/5/20. C A    DEBRIDEMENT OF FOOT  3/4/2021    Procedure: DEBRIDEMENT, FOOT;  Surgeon: Teddy Huber MD;  Location: [facility] OR;  Service: Vascular;;    DEBRIDEMENT OF FOOT Left 3/9/2021    Procedure: DEBRIDEMENT, FOOT, bone biopsy;  Surgeon: Rosio Mayes DPM;  Location: [facility] OR;  Service: Podiatry;  Laterality: Left;  Request neoxx---COVID IN AM  REQUESTING NOON START  RN Phone Pre op.On Blood thinners Plavix and Eliquis.  Covid am of surgery. C A    DEBRIDEMENT OF FOOT Left 5/4/2021     Procedure: DEBRIDEMENT, FOOT;  Surgeon: Farooq Morley DPM;  Location: [facility] OR 2ND FLR;  Service: Podiatry;  Laterality: Left;    ESOPHAGOGASTRODUODENOSCOPY N/A 11/7/2024    Procedure: EGD (ESOPHAGOGASTRODUODENOSCOPY);  Surgeon: Yony Cancino MD;  Location: [facility] ENDO;  Service: Endoscopy;  Laterality: N/A;    INSERTION OF TUNNELED CENTRAL VENOUS HEMODIALYSIS CATHETER N/A 1/27/2020    Procedure: Insertion, Catheter, Central Venous, Hemodialysis;  Surgeon: ESTEBAN Gomez III, MD;  Location: [facility] CATH LAB;  Service: Peripheral Vascular;  Laterality: N/A;    INSERTION OF TUNNELED CENTRAL VENOUS HEMODIALYSIS CATHETER  5/10/2023    Procedure: Insertion, Catheter, Central Venous, Hemodialysis;  Surgeon: Romulo Queen MD;  Location: [facility] CATH LAB;  Service: Cardiology;;    INSERTION, CATHETER, DIALYSIS, PERITONEAL, LAPAROSCOPIC N/A 6/3/2025    Procedure: INSERTION, CATHETER, DIALYSIS, PERITONEAL, LAPAROSCOPIC;  Surgeon: Kvng Tai MD;  Location: [facility] OR;  Service: General;  Laterality: N/A;    IRRIGATION AND DEBRIDEMENT OF LOWER EXTREMITY Left 11/22/2024    Procedure: IRRIGATION AND DEBRIDEMENT, LOWER EXTREMITY;  Surgeon: Yony Lindsey MD;  [address];  Service: Vascular;  Laterality: Left;    MAGNETIC RESONANCE IMAGING Left 11/19/2024    Procedure: MRI (Magnetic Resonance Imagine);  Surgeon: Sophia Hernandez;  Location: [facility] SOPHIA;  Service: Anesthesiology;  Laterality: Left;    PERCUTANEOUS TRANSLUMINAL ANGIOPLASTY N/A 3/4/2021    Procedure: PTA (ANGIOPLASTY, PERCUTANEOUS, TRANSLUMINAL);  Surgeon: Teddy Huber MD;  Location: [facility] OR;  Service: Vascular;  Laterality: N/A;    REMOVAL OF ARTERIOVENOUS GRAFT Left 5/27/2021    Procedure: REMOVAL, GRAFT, LEFT LOWER EXTREMITY, WOUND EXPLORATION;  Surgeon: Teddy Huber MD;  Location: [facility] 2ND FLR;  Service: Vascular;  Laterality: Left;    REMOVAL OF NAIL OF DIGIT Left 3/9/2021    Procedure: REMOVAL, NAIL,  DIGIT;  Surgeon: Rosio aMyes DPM;  Location: [facility] OR;  Service: Podiatry;  Laterality: Left;    RIGHT HEART CATHETERIZATION Right 5/10/2023    Procedure: INSERTION, CATHETER, RIGHT HEART;  Surgeon: Romulo Queen MD;  Location: [facility] CATH LAB;  Service: Cardiology;  Laterality: Right;    STENT, ILIAC ARTERY Left 11/22/2024    Procedure: STENT, ILIAC ARTERY;  Surgeon: Yony Lindsey MD;  Location: [facility] OR [facility] FLR;  Service: Vascular;  Laterality: Left;    THROMBECTOMY Left 3/4/2021    Procedure: THROMBECTOMY, LEFT LOWER EXTREMITY BYPASS GRAFT, ANGIOGRAM, POSSIBLE INTERVENTION, POSSIBLE LEFT LOWER EXTREMITY BYPASS;  Surgeon: Teddy Huber MD;  Location: [facility] OR;  Service: Vascular;  Laterality: Left;    THROMBECTOMY Left 4/29/2021    Procedure: GRAFT THROMBECTOMY, LEFT LOWER EXTREMITY;  Surgeon: Teddy Huber MD;  Location: [facility] OR [facility] FLR;  Service: Vascular;  Laterality: Left;  14.5 min  1179.85 mGy  341.01 Gycm2  240 ml dye    THROMBECTOMY  10/22/2021    Procedure: THROMBECTOMY;  Surgeon: Saad Arenas MD;  Location: [facility] CATH LAB/EP;  Service: Cardiology;;       Review of patient's allergies indicates:   Allergen Reactions    Ciprofloxacin Itching    Pcn [penicillins]      Rash; tolerated ceftriaxone on 1/13/20  Tolerating Augmentin November 2024       No current facility-administered medications on file prior to encounter.     Current Outpatient Medications on File Prior to Encounter   Medication Sig    acetaminophen (TYLENOL) 325 MG tablet Take 2 tablets (650 mg total) by mouth every 8 (eight) hours as needed for Pain.    allopurinoL (ZYLOPRIM) 100 MG tablet TAKE 1/2 TABLET(50 MG) BY MOUTH EVERY OTHER DAY    apixaban (ELIQUIS) 5 mg Tab Take 1 tablet (5 mg total) by mouth 2 (two) times daily.    atorvastatin (LIPITOR) 80 MG tablet Take 1 tablet (80 mg total) by mouth once daily.    blood sugar diagnostic Strp 1 each by Misc.(Non-Drug; Combo Route) route 4 (four) times daily  before meals and nightly.    blood-glucose meter Misc Before meals and at bedtime    blood-glucose meter Misc 1 each by Misc.(Non-Drug; Combo Route) route 3 (three) times daily.    blood-glucose sensor (DEXCOM G7 SENSOR) Radha Apply device and replace every 10 days    [Paused] calcium acetate,phosphat bind, (PHOSLO) 667 mg tablet Take 1 tablet (667 mg total) by mouth 2 (two) times a day.    carvediloL (COREG) 6.25 MG tablet Take 1 tablet (6.25 mg total) by mouth 2 (two) times daily.    cinacalcet (SENSIPAR) 30 MG Tab Take 1 tablet (30 mg total) by mouth every Mon, Wed, Fri.    clopidogreL (PLAVIX) 75 mg tablet Take 1 tablet (75 mg total) by mouth once daily.    famotidine (PEPCID) 20 MG tablet Take 1 tablet (20 mg total) by mouth once daily.    furosemide (LASIX) 40 MG tablet Take 1 tablet (40 mg total) by mouth 2 (two) times daily.    gentamicin (GARAMYCIN) 0.1 % cream Apply topically once daily.    hydrALAZINE (APRESOLINE) 10 MG tablet Take 1 tablet (10 mg total) by mouth every 12 (twelve) hours.    insulin aspart U-100 (NOVOLOG FLEXPEN U-100 INSULIN) 100 unit/mL (3 mL) InPn pen Inject 3 Units into the skin 3 (three) times daily with meals.    insulin aspart U-100 (NOVOLOG) 100 unit/mL (3 mL) InPn pen Inject 5 Units into the skin 3 times daily with meals. **MODERATE CORRECTION DOSE**  151-200 mg/dL Pre-meal: 2 units 2200: 1 unit; 201-250 mg/dL Pre-meal 4 units 2200: 2 units; 251-300mg/dL Pre-meal 6 units 2200: 3 units; 301-350mg/dL Pre-meal 8 units 2200: 4 units; >350mg/dL Pre-meal 10 U 2200: 5 U    insulin glargine U-100, Lantus, 100 unit/mL (3 mL) SubQ InPn pen Inject 5 Units into the skin every evening.    isosorbide dinitrate (ISORDIL) 10 MG tablet Take 0.5 tablets (5 mg total) by mouth 2 (two) times daily.    lancets 33 gauge Misc TEST 3 TIMES DAILY    multivit with min-folic acid (ONE DAILY WOMENS 50 PLUS) 0.4 mg Tab Take 1 tablet by mouth once daily.    mupirocin (BACTROBAN) 2 % ointment Apply topically 2  "(two) times daily.    OZEMPIC 0.25 mg or 0.5 mg (2 mg/3 mL) pen injector Inject 0.5 mg into the skin every 7 days.    pen needle, diabetic (BD MIGUEL 2ND GEN PEN NEEDLE) 32 gauge x 5/32" Ndle Use to inject insulin once daily    pen needle, diabetic 33 gauge x 5/32" Ndle 1 each by Misc.(Non-Drug; Combo Route) route 4 (four) times daily before meals and nightly.    polyethylene glycol (GLYCOLAX) 17 gram/dose powder Mix 1 capful (17 g) with a liquid and take by mouth once daily.    [Paused] pregabalin (LYRICA) 75 MG capsule Take 1 capsule (75 mg total) by mouth Daily.    RENVELA 2.4 gram PwPk Take 1 packet by mouth 3 (three) times daily with meals.    sacubitriL-valsartan (ENTRESTO) 24-26 mg per tablet Take 1 tablet by mouth 2 (two) times daily.    senna-docusate (PERICOLACE) 8.6-50 mg per tablet Take 1 tablet by mouth 2 (two) times daily.    sertraline (ZOLOFT) 100 MG tablet Take 1 tablet (100 mg total) by mouth once daily.    sodium bicarbonate 650 MG tablet TAKE 1 TABLET(650 MG) BY MOUTH THREE TIMES DAILY    traZODone (DESYREL) 50 MG tablet TAKE 1/2 TABLET(25 MG) BY MOUTH EVERY EVENING    vit B,C-FA-zinc-selen-vit D3-E (RENAPLEX-D) 800 mcg-12.5 mg -2,000 unit Tab Take 1 tablet by mouth once daily.    zinc oxide 10 % Oint Apply 1 Application topically 2 (two) times a day.    [DISCONTINUED] lancing device Misc 1 Device by Misc.(Non-Drug; Combo Route) route 2 (two) times daily with meals.     Family History       Problem Relation (Age of Onset)    Diabetes Mother, Father, Paternal Grandmother    Heart disease Maternal Grandmother    No Known Problems Maternal Grandfather, Paternal Grandfather          Tobacco Use    Smoking status: Former     Passive exposure: Never    Smokeless tobacco: Never   Substance and Sexual Activity    Alcohol use: No    Drug use: Yes     Types: Marijuana     Comment: occassional    Sexual activity: Yes     Partners: Male     Review of Systems   Respiratory:  Negative for shortness of breath.  " "  Cardiovascular:  Negative for leg swelling.   Gastrointestinal:  Negative for abdominal pain.   Genitourinary:  Negative for dysuria and frequency.   Psychiatric/Behavioral:  Positive for confusion.      Objective:     Vital Signs (Most Recent):  Temp: 98.2 °F (36.8 °C) (07/18/25 2315)  Pulse: 88 (07/18/25 2318)  Resp: 18 (07/18/25 2315)  BP: (!) 118/57 (07/18/25 2315)  SpO2: 100 % (07/18/25 2315) Vital Signs (24h Range):  Temp:  [97.6 °F (36.4 °C)-98.4 °F (36.9 °C)] 98.2 °F (36.8 °C)  Pulse:  [74-88] 88  Resp:  [18-20] 18  SpO2:  [95 %-100 %] 100 %  BP: ()/(47-58) 118/57     Weight: 60.5 kg (133 lb 6.1 oz)  Body mass index is 22.2 kg/m².     Physical Exam  Vitals and nursing note reviewed.   Constitutional:       General: She is not in acute distress.     Appearance: She is ill-appearing.   Eyes:      General: No scleral icterus.  Cardiovascular:      Rate and Rhythm: Normal rate and regular rhythm.   Pulmonary:      Effort: Pulmonary effort is normal. No respiratory distress.      Breath sounds: No wheezing.   Abdominal:      General: There is no distension.      Palpations: Abdomen is soft.      Tenderness: There is no abdominal tenderness.   Musculoskeletal:      Comments: S/p bilateral AKA - no pitting edema in thighs   Neurological:      Comments: somnolent                Significant Labs: All pertinent labs within the past 24 hours have been reviewed.  ABGs: No results for input(s): "PH", "PCO2", "HCO3", "POCSATURATED", "BE", "TOTALHB", "COHB", "METHB", "O2HB", "POCFIO2", "PO2" in the last 48 hours.  CBC:   Recent Labs   Lab 07/18/25  1214   WBC 5.78   HGB 10.2*   HCT 32.8*        CMP:   Recent Labs   Lab 07/18/25  1214      K 4.6      CO2 25   *   BUN 36*   CREATININE 4.8*   CALCIUM 7.8*   PROT 7.5   ALBUMIN 2.7*   BILITOT 0.6   ALKPHOS 122   AST 20   ALT 7*   ANIONGAP 12     Cardiac Markers:   Recent Labs   Lab 07/18/25  1214   BNP 2,546*     Coagulation: No results for " "input(s): "PT", "INR", "APTT" in the last 48 hours.  Lactic Acid: No results for input(s): "LACTATE" in the last 48 hours.  Magnesium:   Recent Labs   Lab 07/18/25  1214   MG 2.4     Troponin:   Recent Labs   Lab 07/18/25  1214   TROPONINIHS 25*     Urine Studies:   Recent Labs   Lab 07/18/25  1713   COLORU Yellow   APPEARANCEUA Cloudy*   PHUR 7.0   SPECGRAV 1.010   PROTEINUA 2+*   GLUCUA Negative   BILIRUBINUA Negative   OCCULTUA 3+*   NITRITE Negative   UROBILINOGEN Negative   LEUKOCYTESUR 3+*   RBCUA 0   WBCUA >100*   BACTERIA Rare   HYALINECASTS 0       Significant Imaging: I have reviewed all pertinent imaging results/findings within the past 24 hours.    XR CHEST AP PORTABLE     CLINICAL HISTORY:  Sepsis;     TECHNIQUE:  Single frontal view of the chest was performed.     COMPARISON:  06/05/2025     FINDINGS:  Left-sided PermCath, sternotomy wires and surgical clips overlying the cardiomediastinal silhouette relatively stable.  No new lung opacity.  No large pleural effusion or pneumothorax.  Continued atherosclerotic aorta.  Further evaluation as warranted clinically.     Impression:     Please see above        Electronically signed by: Solomon Jauregui DO  Date:                                            07/18/2025  Time:                                           12:22  Assessment/Plan:     Assessment & Plan  Encephalopathy  Urinary tract infection due to ESBL Klebsiella  -patient again has high pyuria - urine gram stain and culture are pending - she was recently treated with ertapenem.  Antibiotics on hold at this time - would f/u gram stain and urine culture   -Hold lyrica at this time     -If urine culture growing bacteria - obtain repeat ID consult for empiric ESBL coverage.     -per last PCP note in March 2025 patient has had ongoing cognitive & memory issues, vascular dementia on problem list, refer for neurocognitive testing on discharge.       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     Discuss if patient needs PD fluid sampled to rule out PD peritonitis as a source of encephalopathy, no acute abdominal pain complaints tonight.   Coronary artery disease involving coronary bypass graft with angina pectoris  Patient with known CAD s/p CABG, which is controlled Will continue Plavix and Statin and monitor for S/Sx of angina/ACS. Continue to monitor on telemetry.   Paroxysmal atrial fibrillation  Patient has paroxysmal (<7 days) atrial fibrillation. Patient is currently in sinus rhythm. [id number] Score: 3. The patients heart rate in the last 24 hours is as follows:  Pulse  Min: 74  Max: 88     Antiarrhythmics       Anticoagulants  apixaban tablet 5 mg, 2 times daily, Oral    Plan  - Replete lytes with a goal of K>4, Mg >2  - Patient is anticoagulated, see medications listed above.  - Patient's afib is currently controlled  - continue carvedilol.       Primary hypertension  Chronic combined systolic and diastolic congestive heart failure  Patient's blood pressure range in the last 24 hours was: BP  Min: 94/47  Max: 125/53.The patient's inpatient anti-hypertensive regimen is listed below:  Current Antihypertensives  furosemide tablet 40 mg, 2 times daily, Oral  carvediloL tablet 6.25 mg, 2 times daily, Oral    Plan  - Patient presented with hypotension today s/p 500cc IV fluids in ED - normotensive tonight   -will continue carevedilol and furosemide.    Holding - entresto, hydrlazine and isosorbide - add back as clinically appropriate.       Anemia due to chronic kidney disease, on chronic dialysis  Chronic and stable  Type 2 diabetes mellitus with hyperglycemia, with long-term current use of insulin  Patient's FSGs are controlled on current medication regimen.  Last A1c reviewed-    Lab Results   Component Value Date    HGBA1C 6.2 (H) 07/03/2025     Most recent fingerstick glucose reviewed-   Recent Labs   Lab 07/18/25 2219   POCTGLUCOSE 298*     Current correctional scale  Low  Maintain anti-hyperglycemic dose as follows-   Antihyperglycemics (From admission, onward)      Start     Stop Route Frequency Ordered    07/19/25 0745  insulin aspart U-100 pen 3 Units         -- SubQ 3 times daily with meals 07/19/25 0310 07/18/25 2230  insulin glargine U-100 (Lantus) pen 5 Units         -- SubQ Nightly 07/18/25 2126 07/18/25 2227  insulin aspart U-100 pen 0-5 Units         -- SubQ Before meals & nightly PRN 07/18/25 2127          Conservative dosing of insulin during admission       Debility  Patient with 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 The patient's latest AMPAC (Activity Measure for Post Acute Care) Score is listed below.    AM-PAC Score - How much help does the patient need for each activity listed  Basic Mobility Total Score: 18  Turning over in bed (including adjusting bedclothes, sheets and blankets)?: A little  Sitting down on and standing up from a chair with arms (e.g., wheelchair, bedside commode, etc.): A little  Moving from lying on back to sitting on the side of the bed?: A little  Moving to and from a bed to a chair (including a wheelchair)?: A little  Need to walk in hospital room?: A little  Climbing 3-5 steps with a railing?: A little    Plan  - Progressive mobility protocol initated  - Fall precaution    CAROLIN (generalized anxiety disorder)  Continue home sertraline. Nightly trazodone.     S/P AKA (above knee amputation) bilateral  No edema/pain/ulceration noted.     VTE Risk Mitigation (From admission, onward)           Ordered     apixaban tablet 5 mg  2 times daily         07/18/25 2126                     On 07/18/2025, patient should be placed in hospital observation services under my care.             Andrei Cobb,  MD  Department of Hospital Medicine  [facility] - Observation 11H

## 2025-07-19 NOTE — ASSESSMENT & PLAN NOTE
Patient has paroxysmal (<7 days) atrial fibrillation. Patient is currently in sinus rhythm. [id number] Score: 3. The patients heart rate in the last 24 hours is as follows:  Pulse  Min: 74  Max: 88     Antiarrhythmics       Anticoagulants  apixaban tablet 5 mg, 2 times daily, Oral    Plan  - Replete lytes with a goal of K>4, Mg >2  - Patient is anticoagulated, see medications listed above.  - Patient's afib is currently controlled  - continue carvedilol.

## 2025-07-19 NOTE — SUBJECTIVE & OBJECTIVE
Past Medical History:   Diagnosis Date    Acute myocardial infarction, unspecified 01/10/2024    Anaphylactic shock, unspecified, initial encounter 01/10/2024    Anxiety     Chronic pain syndrome     CKD (chronic kidney disease), stage III     CVD (cardiovascular disease)     Depression     Diabetes mellitus, type 2     GERD (gastroesophageal reflux disease)     Hyperemesis 03/23/2021    Hypokalemia 03/23/2021    Infection of below knee amputation stump 03/12/2022    Osteomyelitis     Osteomyelitis of left foot 04/30/2021    Pressure injury of sacral region, unstageable 05/14/2024    Ulcer of left foot     Vaginal delivery     x1    Vascular graft infection 06/01/2021       Past Surgical History:   Procedure Laterality Date    ABOVE-KNEE AMPUTATION Left 5/18/2021    Procedure: AMPUTATION, ABOVE KNEE;  Surgeon: Teddy Huber MD;  Location: [facility] OR [address] [place] [address];  Service: Vascular;  Laterality: Left;    ABOVE-KNEE AMPUTATION Right 3/18/2022    Procedure: AMPUTATION, ABOVE KNEE;  Surgeon: DAYNE Florez II, MD;  Location: [facility] OR [address] [place] [address];  Service: Vascular;  Laterality: Right;    Angiogram - Right Extremity Right 7/9/15    ANGIOGRAM, EXTREMITY, UNILATERAL Left 11/22/2024    Procedure: ANGIOGRAM, EXTREMITY, UNILATERAL;  Surgeon: Yony Lindsey MD;  Location: [facility] OR [address] [place] [address];  Service: Vascular;  Laterality: Left;  Left leg angio, L CFA approach  5.4 min  597.31 mGy  73.8959 Gycm2  20 ml dye    angiogram-left leg  10/6/15    ANGIOGRAPHY OF LOWER EXTREMITY Left 4/29/2021    Procedure: ANGIOGRAM, LOWER EXTREMITY;  Surgeon: Teddy Huber MD;  Location: [facility] OR [address] [place] [address];  Service: Vascular;  Laterality: Left;    BELOW KNEE AMPUTATION OF LOWER EXTREMITY Right 12/28/2021    Procedure: AMPUTATION, BELOW KNEE;  Surgeon: Kaitlyn Rojas MD;  Location: [facility];  Service: General;  Laterality: Right;    CATHETERIZATION OF BOTH LEFT AND RIGHT HEART N/A 12/18/2019    Procedure: CATHETERIZATION, HEART,  BOTH LEFT AND RIGHT;  Surgeon: Que Fernando III, MD;  Location: [facility] CATH LAB;  Service: Cardiology;  Laterality: N/A;    CORONARY ANGIOGRAPHY N/A 12/18/2019    Procedure: ANGIOGRAM, CORONARY ARTERY;  Surgeon: Que Fernando III, MD;  Location: [facility] CATH LAB;  Service: Cardiology;  Laterality: N/A;    CORONARY ANGIOGRAPHY INCLUDING BYPASS GRAFTS WITH CATHETERIZATION OF LEFT HEART N/A 7/28/2020    Procedure: ANGIOGRAM, CORONARY, INCLUDING BYPASS GRAFT, WITH LEFT HEART CATHETERIZATION, 9 am;  Surgeon: Rachel Easley MD;  Location: [facility] CATH LAB;  Service: Cardiology;  Laterality: N/A;    CORONARY ARTERY BYPASS GRAFT (CABG) N/A 1/14/2020    Procedure: CORONARY ARTERY BYPASS GRAFT (CABG) x 1     Off Pump;  Surgeon: Huang Altamirano MD;  Location: [facility] OR [address] [place] [address];  Service: Cardiovascular;  Laterality: N/A;    CREATION OF FEMORAL-TIBIAL ARTERY BYPASS Left 4/29/2021    Procedure: CREATION, BYPASS, ARTERIAL, FEMORAL TO ANTERIOR TIBIAL;  Surgeon: Teddy Huber MD;  [facility];  Service: Vascular;  Laterality: Left;    CREATION OF FEMOROPOPLITEAL ARTERIAL BYPASS USING GRAFT Left 8/18/2020    Procedure: CREATION, BYPASS, ARTERIAL, FEMORAL TO POPLITEAL, USING GRAFT, LEFT LOWER EXTREMITY;  Surgeon: Teddy Huber MD;  Location: [facility];  Service: Vascular;  Laterality: Left;  REQUEST 7:15 A.M. START----COVID NEGATIVE ON 8/17  1ST CASE STARTKENYA PER LEANA ON 8/7/2020 @ 942AM-  RN PREOP 8/12/2020   T/S-----CLEARED BY CARDS-------PENDING INSURANCE    DEBRIDEMENT OF FOOT Left 9/8/2020    Procedure: DEBRIDEMENT, FOOT;  Surgeon: Rosio Mayes DPM;  Location: [facility] OR;  Service: Podiatry;  Laterality: Left;  request neoxx .   RN Pre Op 9-4-2020, Covid negative on 9/5/20. C A    DEBRIDEMENT OF FOOT  3/4/2021    Procedure: DEBRIDEMENT, FOOT;  Surgeon: Teddy Huber MD;  Location: [facility] OR;  Service: Vascular;;    DEBRIDEMENT OF FOOT Left 3/9/2021    Procedure: DEBRIDEMENT, FOOT, bone  biopsy;  Surgeon: Rosio Mayes DPM;  Location: [facility] OR;  Service: Podiatry;  Laterality: Left;  Request neoxx---COVID IN AM  REQUESTING NOON START  RN Phone Pre op.On Blood thinners Plavix and Eliquis.  Covid am of surgery. C A    DEBRIDEMENT OF FOOT Left 5/4/2021    Procedure: DEBRIDEMENT, FOOT;  Surgeon: Farooq Morley DPM;  Location: [facility] OR 2ND FLR;  Service: Podiatry;  Laterality: Left;    ESOPHAGOGASTRODUODENOSCOPY N/A 11/7/2024    Procedure: EGD (ESOPHAGOGASTRODUODENOSCOPY);  Surgeon: Yony Cancino MD;  Location: [facility];  Service: Endoscopy;  Laterality: N/A;    INSERTION OF TUNNELED CENTRAL VENOUS HEMODIALYSIS CATHETER N/A 1/27/2020    Procedure: Insertion, Catheter, Central Venous, Hemodialysis;  Surgeon: ESTEBAN Gomez III, MD;  Location: [facility] CATH LAB;  Service: Peripheral Vascular;  Laterality: N/A;    INSERTION OF TUNNELED CENTRAL VENOUS HEMODIALYSIS CATHETER  5/10/2023    Procedure: Insertion, Catheter, Central Venous, Hemodialysis;  Surgeon: Romulo Queen MD;  Location: [facility] CATH LAB;  Service: Cardiology;;    INSERTION, CATHETER, DIALYSIS, PERITONEAL, LAPAROSCOPIC N/A 6/3/2025    Procedure: INSERTION, CATHETER, DIALYSIS, PERITONEAL, LAPAROSCOPIC;  Surgeon: Kvng Tai MD;  Location: [facility] OR;  Service: General;  Laterality: N/A;    IRRIGATION AND DEBRIDEMENT OF LOWER EXTREMITY Left 11/22/2024    Procedure: IRRIGATION AND DEBRIDEMENT, LOWER EXTREMITY;  Surgeon: Yony Lindsey MD;  Location: [address] FLR;  Service: Vascular;  Laterality: Left;    MAGNETIC RESONANCE IMAGING Left 11/19/2024    Procedure: MRI (Magnetic Resonance Imagine);  Surgeon: Sophia Hernandez;  Location: [facility] SOPHIA;  Service: Anesthesiology;  Laterality: Left;    PERCUTANEOUS TRANSLUMINAL ANGIOPLASTY N/A 3/4/2021    Procedure: PTA (ANGIOPLASTY, PERCUTANEOUS, TRANSLUMINAL);  Surgeon: Teddy Huber MD;  Location: [facility] OR;  Service: Vascular;  Laterality: N/A;    REMOVAL OF  ARTERIOVENOUS GRAFT Left 5/27/2021    Procedure: REMOVAL, GRAFT, LEFT LOWER EXTREMITY, WOUND EXPLORATION;  Surgeon: Teddy Huber MD;  Location: [facility] OR 2ND FLR;  Service: Vascular;  Laterality: Left;    REMOVAL OF NAIL OF DIGIT Left 3/9/2021    Procedure: REMOVAL, NAIL, DIGIT;  Surgeon: Rosio Mayes DPM;  Location: [facility] OR;  Service: Podiatry;  Laterality: Left;    RIGHT HEART CATHETERIZATION Right 5/10/2023    Procedure: INSERTION, CATHETER, RIGHT HEART;  Surgeon: Romulo Queen MD;  Location: [facility] CATH LAB;  Service: Cardiology;  Laterality: Right;    STENT, ILIAC ARTERY Left 11/22/2024    Procedure: STENT, ILIAC ARTERY;  Surgeon: Yony Lindsey MD;  [facility];  Service: Vascular;  Laterality: Left;    THROMBECTOMY Left 3/4/2021    Procedure: THROMBECTOMY, LEFT LOWER EXTREMITY BYPASS GRAFT, ANGIOGRAM, POSSIBLE INTERVENTION, POSSIBLE LEFT LOWER EXTREMITY BYPASS;  Surgeon: Teddy Huber MD;  Location: [facility] OR;  Service: Vascular;  Laterality: Left;    THROMBECTOMY Left 4/29/2021    Procedure: GRAFT THROMBECTOMY, LEFT LOWER EXTREMITY;  Surgeon: Teddy Huber MD;  Location: [facility] OR [facility] FLR;  Service: Vascular;  Laterality: Left;  14.5 min  1179.85 mGy  341.01 Gycm2  240 ml dye    THROMBECTOMY  10/22/2021    Procedure: THROMBECTOMY;  Surgeon: Saad Arenas MD;  Location: [facility] CATH LAB/EP;  Service: Cardiology;;       Review of patient's allergies indicates:   Allergen Reactions    Ciprofloxacin Itching    Pcn [penicillins]      Rash; tolerated ceftriaxone on 1/13/20  Tolerating Augmentin November 2024     Current Facility-Administered Medications   Medication Frequency    acetaminophen tablet 650 mg Q8H PRN    allopurinol split tablet 50 mg Every other day    aluminum-magnesium hydroxide-simethicone 200-200-20 mg/5 mL suspension 30 mL QID PRN    apixaban tablet 5 mg BID    atorvastatin tablet 80 mg Daily    [START ON 7/21/2025] cinacalcet tablet 30 mg Every  Mon, Wed, Fri    clopidogreL tablet 75 mg Daily    dextrose 50% injection 12.5 g PRN    dextrose 50% injection 25 g PRN    ertapenem (INVANZ) 1 g in 0.9% NaCl 100 mL IVPB (MB+) Q24H    erythromycin 5 mg/gram (0.5 %) ophthalmic ointment Q6H    famotidine tablet 20 mg Q48H    fluconazole tablet 100 mg Daily    furosemide tablet 40 mg BID    glucagon (human recombinant) injection 1 mg PRN    glucose chewable tablet 16 g PRN    glucose chewable tablet 24 g PRN    insulin aspart U-100 pen 0-5 Units QID (AC + HS) PRN    naloxone 0.4 mg/mL injection 0.02 mg PRN    ondansetron injection 4 mg Q8H PRN    polyethylene glycol packet 17 g Daily    prochlorperazine injection Soln 5 mg Q6H PRN    senna-docusate 8.6-50 mg per tablet 1 tablet BID PRN    sertraline tablet 100 mg Daily    sevelamer carbonate 2.4 gram powder 2.4 g TID WM    sodium bicarbonate tablet 650 mg BID    sodium chloride 0.9% flush 10 mL Q6H PRN    trazodone split tablet 25 mg QHS     Family History       Problem Relation (Age of Onset)    Diabetes Mother, Father, Paternal Grandmother    Heart disease Maternal Grandmother    No Known Problems Maternal Grandfather, Paternal Grandfather          Tobacco Use    Smoking status: Former     Passive exposure: Never    Smokeless tobacco: Never   Substance and Sexual Activity    Alcohol use: No    Drug use: Yes     Types: Marijuana     Comment: occassional    Sexual activity: Yes     Partners: Male     Review of Systems   Unable to perform ROS: Mental status change     Objective:     Vital Signs (Most Recent):  Temp: 98.6 °F (37 °C) (07/19/25 0732)  Pulse: 75 (07/19/25 1115)  Resp: 16 (07/19/25 0732)  BP: (!) 120/53 (07/19/25 0732)  SpO2: 98 % (07/19/25 0732) Vital Signs (24h Range):  Temp:  [97.6 °F (36.4 °C)-98.6 °F (37 °C)] 98.6 °F (37 °C)  Pulse:  [74-88] 75  Resp:  [16-20] 16  SpO2:  [95 %-100 %] 98 %  BP: (106-125)/(49-58) 120/53     Weight: 60.5 kg (133 lb 6.1 oz) (07/19/25 0400)  Body mass index is 26.05  kg/m².  Body surface area is 1.6 meters squared.    I/O last 3 completed shifts:  In: 970 [P.O.:470; IV Piggyback:500]  Out: -      Physical Exam  Vitals and nursing note reviewed.   Constitutional:       General: She is not in acute distress.     Appearance: She is ill-appearing. She is not toxic-appearing.   HENT:      Head: Normocephalic and atraumatic.      Nose: No congestion or rhinorrhea.   Eyes:      General:         Right eye: No discharge.         Left eye: No discharge.      Extraocular Movements: Extraocular movements intact.      Conjunctiva/sclera: Conjunctivae normal.   Cardiovascular:      Rate and Rhythm: Rhythm irregular.      Heart sounds: Murmur heard.   Pulmonary:      Effort: No respiratory distress.      Breath sounds: Normal breath sounds. No wheezing or rales.   Abdominal:      General: There is no distension.      Palpations: Abdomen is soft.      Tenderness: There is no abdominal tenderness.      Comments: PD catheter dressing intact   Musculoskeletal:         General: Swelling and deformity (bilateral AKA) present.      Cervical back: Normal range of motion and neck supple.      Right lower leg: Edema present.      Left lower leg: Edema present.   Skin:     General: Skin is warm and dry.   Neurological:      Mental Status: She is alert.      Comments: Lethargic, able to respond to questions          Significant Labs:  CBC:   Recent Labs   Lab 07/18/25  1214   WBC 5.78   RBC 3.27*   HGB 10.2*   HCT 32.8*      *   MCH 31.2*   MCHC 31.1*     CMP:   Recent Labs   Lab 07/18/25  1214   *   CALCIUM 7.8*   ALBUMIN 2.7*   PROT 7.5      K 4.6   CO2 25      BUN 36*   CREATININE 4.8*   ALKPHOS 122   ALT 7*   AST 20   BILITOT 0.6

## 2025-07-19 NOTE — PROGRESS NOTES
07/19/25 1130   Peritoneal Dialysis   Exchange Type Manual   Dianeal Solution Dextrose 1.5% in 2000 mL   Manual Peritoneal Dialysis   Manual Fill Volume (mL) 1000 mL   Manual Drain Volume (mL) 150 mL   Effluent Appearance Yellow     Drained 150 mL. Placed 1 L of dextrose 1.5% to dwell for 2 hours. Per MD orders.

## 2025-07-19 NOTE — PLAN OF CARE
[address] - Observation 11H  Initial Discharge Assessment     Primary Care Provider: Vanessa Noel MD    Admission Diagnosis: Screening for cardiovascular condition [Z13.6]  ESRD (end stage renal disease) on dialysis [N18.6, Z99.2]  UTI symptoms [R39.9]  Hypotension, unspecified hypotension type [I95.9]    Admission Date: 7/18/2025  Expected Discharge Date: 7/21/2025    Transition of Care Barriers: None    Payor: HU[employer] MANAGED MEDICARE / Plan: HU[employer] MEDICARE SELECT PARTNER / Product Type: Medicare Advantage /     Extended Emergency Contact Information  Primary Emergency Contact: Randell Connelly  Address: [address] [place] [address] [place] [address] [facility]  Home Phone: [phone]  Mobile Phone: [phone]  Relation: Spouse  Secondary Emergency Contact: Viral Jaramillo  Mobile Phone: [phone]  Relation: Son  Preferred language: English   needed? No  Mother: Tea Sal   [place]  Mobile Phone: [phone]    Discharge Plan A: Home with family  Discharge Plan B: Home    [id number] - [place], [place] - [address] AT [facility] [address]  [address]  [facility] [address]  Phone: [phone] Fax: [phone]    Initial Assessment (most recent)       Adult Discharge Assessment - 07/19/25 1000          Discharge Assessment    Assessment Type Discharge Planning Assessment     Confirmed/corrected address, phone number and insurance Yes     Confirmed Demographics Correct on Facesheet     Source of Information patient;health record     Communicated DEVAN with patient/caregiver Yes     People in Home spouse     Name(s) of People in Home Spouse     Do you expect to return to your current living situation? Yes     Do you have help at home or someone to help you manage your care at home? Yes     Prior to hospitilization cognitive status: Alert/Oriented     Current cognitive status: Alert/Oriented     Walking or  Climbing Stairs Difficulty yes     Mobility Management Electric wheelchair     Home Accessibility wheelchair accessible     Readmission within 30 days? Yes     Patient currently being followed by outpatient case management? No     Do you currently have service(s) that help you manage your care at home? No     Do you take prescription medications? Yes     Do you have any problems affording any of your prescribed medications? No     Is the patient taking medications as prescribed? yes     How do you get to doctors appointments? other (see comments)   needs wheelchair van transportation home    Are you on dialysis? Yes     Dialysis Name and Scheduled days [facility] MWF     Do you take coumadin? No     Discharge Plan A Home with family     Discharge Plan B Home     DME Needed Upon Discharge  none     Discharge Plan discussed with: Patient     Transition of Care Barriers None                 Discharge Plan A and Plan B have been determined by review of patient's clinical status, future medical and therapeutic needs, and coverage/benefits for post-acute care in coordination with multidisciplinary team members.      Readmission Assessment (most recent)       Readmission Assessment - 07/19/25 1045          Readmission    Was this a planned readmission? No     Why were you hospitalized in the last 30 days? hyperkalemia     When you left the hospital where did you go? Home with Family                  Matilda Donaldson RN  Weekend  - [facility]  [id number]

## 2025-07-19 NOTE — SUBJECTIVE & OBJECTIVE
Interval History: Encephalopathy remains. UA reflexed to culture, started on empiric ertapenem although ID recommends looking for alternate source of mentation change. Lyrica has been discontinued and discussed with patient's significant other. Nephrology consulted for PD/HD status. Denies abdominal pain, n/v, diarrhae.     Review of Systems  Objective:     Vital Signs (Most Recent):  Temp: 98.6 °F (37 °C) (07/19/25 0732)  Pulse: 75 (07/19/25 1115)  Resp: 16 (07/19/25 0732)  BP: (!) 120/53 (07/19/25 0732)  SpO2: 98 % (07/19/25 0732) Vital Signs (24h Range):  Temp:  [97.6 °F (36.4 °C)-98.6 °F (37 °C)] 98.6 °F (37 °C)  Pulse:  [74-88] 75  Resp:  [16-20] 16  SpO2:  [95 %-100 %] 98 %  BP: (106-125)/(49-58) 120/53     Weight: 60.5 kg (133 lb 6.1 oz)  Body mass index is 26.05 kg/m².    Intake/Output Summary (Last 24 hours) at 7/19/2025 1245  Last data filed at 7/19/2025 0030  Gross per 24 hour   Intake 970 ml   Output --   Net 970 ml         Physical Exam      Constitutional:       General: She is not in acute distress.  Eyes:      General: No scleral icterus.  Cardiovascular:      Rate and Rhythm: Normal rate and regular rhythm.   Pulmonary:      Effort: Pulmonary effort is normal. No respiratory distress.      Breath sounds: No wheezing.   Abdominal:      General: There is no distension.      Palpations: Abdomen is soft.      Tenderness: There is no abdominal tenderness.   PD catheter c/d/i  Musculoskeletal:      Comments: S/p bilateral AKA - no pitting edema in thighs   Neurological:      Comments: alert, answering questions, confused    Significant Labs: All pertinent labs within the past 24 hours have been reviewed.    Significant Imaging: I have reviewed all pertinent imaging results/findings within the past 24 hours.

## 2025-07-19 NOTE — CONSULTS
[facility]  Nephrology  Consult Note    Patient Name: Suyapa Connelly  MRN: [id number]  Admission Date: 7/18/2025  Hospital Length of Stay: 0 days  Attending Provider: Frances Bee MD   Primary Care Physician: Vanessa Noel MD  Principal Problem:Encephalopathy    Inpatient consult to Nephrology  Consult performed by: Agustin Rai, NP  Consult ordered by: Andrei Cobb MD  Reason for consult: ESRD        Subjective:     HPI: Suyapa Connelly is a 60 yo female with hx of ESRD currently on iHD MWF (Transitioning to PD, currently in training), DM2, HTN, HLD, PAD s/p bilateral AKA, CAD, HFrEF, and pAF who presents with  to the ED on 7/18 with chief complaint of weakness, fatigue, and confusion.  Information obtained from records review and  at bedside.  She was recently admitted to [facility] with similar complaints and found to have a UTI with cx + for Klebsiella ESBL.  She underwent Abx therapy with Ertapenem x 5 days with last dose on 7/12.  Her symptoms have not improved, and this is the reason for admission this time.  During the last admission, PD fluid analysis was obtained on 7/3 noting a WBC of 222; SEGs 32%.  She was started on IP Vanc/Ceftaz that was then discontinued on discharge.  PD cultures negative to date.  A repeat PD fluid analysis done on 7/7 revealed a WBC of 16 with SEGs of 1%.  She denies any abdominal complaints on exam today, awake and able to respond to questions but does appear to be lethargic.  denies any fevers at home.  No PD training was completed this week and she was undergoing regular iHD on Monday and Wednesday but has missed her HD appointment on Friday as she presented to the hospital.  UCx pending, she has been restarted on Ertapenem along with fluconazole for fungal PD prophylaxis.  Nephrology is consulted for ESRD management while IP.        Past Medical History:   Diagnosis Date    Acute myocardial infarction, unspecified 01/10/2024     Anaphylactic shock, unspecified, initial encounter 01/10/2024    Anxiety     Chronic pain syndrome     CKD (chronic kidney disease), stage III     CVD (cardiovascular disease)     Depression     Diabetes mellitus, type 2     GERD (gastroesophageal reflux disease)     Hyperemesis 03/23/2021    Hypokalemia 03/23/2021    Infection of below knee amputation stump 03/12/2022    Osteomyelitis     Osteomyelitis of left foot 04/30/2021    Pressure injury of sacral region, unstageable 05/14/2024    Ulcer of left foot     Vaginal delivery     x1    Vascular graft infection 06/01/2021       Past Surgical History:   Procedure Laterality Date    ABOVE-KNEE AMPUTATION Left 5/18/2021    Procedure: AMPUTATION, ABOVE KNEE;  Surgeon: Teddy Huber MD;  Location: [facility] OR [address] [place] [address];  Service: Vascular;  Laterality: Left;    ABOVE-KNEE AMPUTATION Right 3/18/2022    Procedure: AMPUTATION, ABOVE KNEE;  Surgeon: DAYNE Florez II, MD;  Location: [facility] OR [address] [place] [address];  Service: Vascular;  Laterality: Right;    Angiogram - Right Extremity Right 7/9/15    ANGIOGRAM, EXTREMITY, UNILATERAL Left 11/22/2024    Procedure: ANGIOGRAM, EXTREMITY, UNILATERAL;  Surgeon: Yony Lindsey MD;  Location: [facility] OR [address] [place] [address];  Service: Vascular;  Laterality: Left;  Left leg angio, L CFA approach  5.4 min  597.31 mGy  73.8959 Gycm2  20 ml dye    angiogram-left leg  10/6/15    ANGIOGRAPHY OF LOWER EXTREMITY Left 4/29/2021    Procedure: ANGIOGRAM, LOWER EXTREMITY;  Surgeon: Teddy Huber MD;  Location: [facility] OR [address] [place] [address];  Service: Vascular;  Laterality: Left;    BELOW KNEE AMPUTATION OF LOWER EXTREMITY Right 12/28/2021    Procedure: AMPUTATION, BELOW KNEE;  Surgeon: Kaitlyn Rojas MD;  Location: [facility] OR;  Service: General;  Laterality: Right;    CATHETERIZATION OF BOTH LEFT AND RIGHT HEART N/A 12/18/2019    Procedure: CATHETERIZATION, HEART, BOTH LEFT AND RIGHT;  Surgeon: Que Fernando III, MD;  Location: [facility] CATH LAB;  Service:  Cardiology;  Laterality: N/A;    CORONARY ANGIOGRAPHY N/A 12/18/2019    Procedure: ANGIOGRAM, CORONARY ARTERY;  Surgeon: Que Fernando III, MD;  Location: [facility] CATH LAB;  Service: Cardiology;  Laterality: N/A;    CORONARY ANGIOGRAPHY INCLUDING BYPASS GRAFTS WITH CATHETERIZATION OF LEFT HEART N/A 7/28/2020    Procedure: ANGIOGRAM, CORONARY, INCLUDING BYPASS GRAFT, WITH LEFT HEART CATHETERIZATION, 9 am;  Surgeon: Rachel Easley MD;  Location: [facility] CATH LAB;  Service: Cardiology;  Laterality: N/A;    CORONARY ARTERY BYPASS GRAFT (CABG) N/A 1/14/2020    Procedure: CORONARY ARTERY BYPASS GRAFT (CABG) x 1     Off Pump;  Surgeon: Huang Altamirano MD;  Location: [facility] OR [address] [place] [address];  Service: Cardiovascular;  Laterality: N/A;    CREATION OF FEMORAL-TIBIAL ARTERY BYPASS Left 4/29/2021    Procedure: CREATION, BYPASS, ARTERIAL, FEMORAL TO ANTERIOR TIBIAL;  Surgeon: Teddy Huber MD;  Location: [facility] OR [address] [place] [address];  Service: Vascular;  Laterality: Left;    CREATION OF FEMOROPOPLITEAL ARTERIAL BYPASS USING GRAFT Left 8/18/2020    Procedure: CREATION, BYPASS, ARTERIAL, FEMORAL TO POPLITEAL, USING GRAFT, LEFT LOWER EXTREMITY;  Surgeon: Teddy Huber MD;  Location: [facility] OR;  Service: Vascular;  Laterality: Left;  REQUEST 7:15 A.M. START----COVID NEGATIVE ON 8/17  1ST CASE STARTKENYA DUEÑAS ON 8/7/2020 @ 942AM-LO  RN PREOP 8/12/2020   T/S-----CLEARED BY CARDS-------PENDING INSURANCE    DEBRIDEMENT OF FOOT Left 9/8/2020    Procedure: DEBRIDEMENT, FOOT;  Surgeon: Rosio Mayes DPM;  Location: [facility] OR;  Service: Podiatry;  Laterality: Left;  request neoxx .   RN Pre Op 9-4-2020, Covid negative on 9/5/20. C A    DEBRIDEMENT OF FOOT  3/4/2021    Procedure: DEBRIDEMENT, FOOT;  Surgeon: Teddy Huber MD;  Location: [facility] OR;  Service: Vascular;;    DEBRIDEMENT OF FOOT Left 3/9/2021    Procedure: DEBRIDEMENT, FOOT, bone biopsy;  Surgeon: Rosio Mayes DPM;  Location: [facility] OR;  Service: Podiatry;  Laterality: Left;   Request neoxx---COVID IN AM  REQUESTING NOON START  RN Phone Pre op.On Blood thinners Plavix and Eliquis.  Covid am of surgery. C A    DEBRIDEMENT OF FOOT Left 5/4/2021    Procedure: DEBRIDEMENT, FOOT;  Surgeon: Farooq Morley DPM;  Location: [facility] OR 2ND FLR;  Service: Podiatry;  Laterality: Left;    ESOPHAGOGASTRODUODENOSCOPY N/A 11/7/2024    Procedure: EGD (ESOPHAGOGASTRODUODENOSCOPY);  Surgeon: Yony Cancino MD;  Location: [facility] ENDO;  Service: Endoscopy;  Laterality: N/A;    INSERTION OF TUNNELED CENTRAL VENOUS HEMODIALYSIS CATHETER N/A 1/27/2020    Procedure: Insertion, Catheter, Central Venous, Hemodialysis;  Surgeon: ESTEBAN Gomez III, MD;  Location: [facility] CATH LAB;  Service: Peripheral Vascular;  Laterality: N/A;    INSERTION OF TUNNELED CENTRAL VENOUS HEMODIALYSIS CATHETER  5/10/2023    Procedure: Insertion, Catheter, Central Venous, Hemodialysis;  Surgeon: Romulo Queen MD;  Location: [facility] CATH LAB;  Service: Cardiology;;    INSERTION, CATHETER, DIALYSIS, PERITONEAL, LAPAROSCOPIC N/A 6/3/2025    Procedure: INSERTION, CATHETER, DIALYSIS, PERITONEAL, LAPAROSCOPIC;  Surgeon: Kvng Tai MD;  Location: [facility] OR;  Service: General;  Laterality: N/A;    IRRIGATION AND DEBRIDEMENT OF LOWER EXTREMITY Left 11/22/2024    Procedure: IRRIGATION AND DEBRIDEMENT, LOWER EXTREMITY;  Surgeon: Yony Lindsey MD;  [address]  Service: Vascular;  Laterality: Left;    MAGNETIC RESONANCE IMAGING Left 11/19/2024    Procedure: MRI (Magnetic Resonance Imagine);  Surgeon: Sophia Hernandez;  Location: [facility] SOPHIA;  Service: Anesthesiology;  Laterality: Left;    PERCUTANEOUS TRANSLUMINAL ANGIOPLASTY N/A 3/4/2021    Procedure: PTA (ANGIOPLASTY, PERCUTANEOUS, TRANSLUMINAL);  Surgeon: Teddy Huber MD;  Location: [facility] OR;  Service: Vascular;  Laterality: N/A;    REMOVAL OF ARTERIOVENOUS GRAFT Left 5/27/2021    Procedure: REMOVAL, GRAFT, LEFT LOWER EXTREMITY, WOUND EXPLORATION;   Surgeon: Teddy Huber MD;  Location: [facility] OR [facility] FLR;  Service: Vascular;  Laterality: Left;    REMOVAL OF NAIL OF DIGIT Left 3/9/2021    Procedure: REMOVAL, NAIL, DIGIT;  Surgeon: Rosio Mayes DPM;  Location: [facility] OR;  Service: Podiatry;  Laterality: Left;    RIGHT HEART CATHETERIZATION Right 5/10/2023    Procedure: INSERTION, CATHETER, RIGHT HEART;  Surgeon: Romulo Queen MD;  Location: [facility] CATH LAB;  Service: Cardiology;  Laterality: Right;    STENT, ILIAC ARTERY Left 11/22/2024    Procedure: STENT, ILIAC ARTERY;  Surgeon: Yony Lindsey MD;  Location: [facility] OR [facility] FLR;  Service: Vascular;  Laterality: Left;    THROMBECTOMY Left 3/4/2021    Procedure: THROMBECTOMY, LEFT LOWER EXTREMITY BYPASS GRAFT, ANGIOGRAM, POSSIBLE INTERVENTION, POSSIBLE LEFT LOWER EXTREMITY BYPASS;  Surgeon: Teddy Huber MD;  Location: [facility] OR;  Service: Vascular;  Laterality: Left;    THROMBECTOMY Left 4/29/2021    Procedure: GRAFT THROMBECTOMY, LEFT LOWER EXTREMITY;  Surgeon: Teddy Huber MD;  [address]  Service: Vascular;  Laterality: Left;  14.5 min  1179.85 mGy  341.01 Gycm2  240 ml dye    THROMBECTOMY  10/22/2021    Procedure: THROMBECTOMY;  Surgeon: Saad Arenas MD;  Location: [facility] CATH LAB/EP;  Service: Cardiology;;       Review of patient's allergies indicates:   Allergen Reactions    Ciprofloxacin Itching    Pcn [penicillins]      Rash; tolerated ceftriaxone on 1/13/20  Tolerating Augmentin November 2024     Current Facility-Administered Medications   Medication Frequency    acetaminophen tablet 650 mg Q8H PRN    allopurinol split tablet 50 mg Every other day    aluminum-magnesium hydroxide-simethicone 200-200-20 mg/5 mL suspension 30 mL QID PRN    apixaban tablet 5 mg BID    atorvastatin tablet 80 mg Daily    [START ON 7/21/2025] cinacalcet tablet 30 mg Every Mon, Wed, Fri    clopidogreL tablet 75 mg Daily    dextrose 50% injection 12.5 g PRN    dextrose 50%  injection 25 g PRN    ertapenem (INVANZ) 1 g in 0.9% NaCl 100 mL IVPB (MB+) Q24H    erythromycin 5 mg/gram (0.5 %) ophthalmic ointment Q6H    famotidine tablet 20 mg Q48H    fluconazole tablet 100 mg Daily    furosemide tablet 40 mg BID    glucagon (human recombinant) injection 1 mg PRN    glucose chewable tablet 16 g PRN    glucose chewable tablet 24 g PRN    insulin aspart U-100 pen 0-5 Units QID (AC + HS) PRN    naloxone 0.4 mg/mL injection 0.02 mg PRN    ondansetron injection 4 mg Q8H PRN    polyethylene glycol packet 17 g Daily    prochlorperazine injection Soln 5 mg Q6H PRN    senna-docusate 8.6-50 mg per tablet 1 tablet BID PRN    sertraline tablet 100 mg Daily    sevelamer carbonate 2.4 gram powder 2.4 g TID WM    sodium bicarbonate tablet 650 mg BID    sodium chloride 0.9% flush 10 mL Q6H PRN    trazodone split tablet 25 mg QHS     Family History       Problem Relation (Age of Onset)    Diabetes Mother, Father, Paternal Grandmother    Heart disease Maternal Grandmother    No Known Problems Maternal Grandfather, Paternal Grandfather          Tobacco Use    Smoking status: Former     Passive exposure: Never    Smokeless tobacco: Never   Substance and Sexual Activity    Alcohol use: No    Drug use: Yes     Types: Marijuana     Comment: occassional    Sexual activity: Yes     Partners: Male     Review of Systems   Unable to perform ROS: Mental status change     Objective:     Vital Signs (Most Recent):  Temp: 98.6 °F (37 °C) (07/19/25 0732)  Pulse: 75 (07/19/25 1115)  Resp: 16 (07/19/25 0732)  BP: (!) 120/53 (07/19/25 0732)  SpO2: 98 % (07/19/25 0732) Vital Signs (24h Range):  Temp:  [97.6 °F (36.4 °C)-98.6 °F (37 °C)] 98.6 °F (37 °C)  Pulse:  [74-88] 75  Resp:  [16-20] 16  SpO2:  [95 %-100 %] 98 %  BP: (106-125)/(49-58) 120/53     Weight: 60.5 kg (133 lb 6.1 oz) (07/19/25 0400)  Body mass index is 26.05 kg/m².  Body surface area is 1.6 meters squared.    I/O last 3 completed shifts:  In: 970 [P.O.:470; IV  Piggyback:500]  Out: -      Physical Exam  Vitals and nursing note reviewed.   Constitutional:       General: She is not in acute distress.     Appearance: She is ill-appearing. She is not toxic-appearing.   HENT:      Head: Normocephalic and atraumatic.      Nose: No congestion or rhinorrhea.   Eyes:      General:         Right eye: No discharge.         Left eye: No discharge.      Extraocular Movements: Extraocular movements intact.      Conjunctiva/sclera: Conjunctivae normal.   Cardiovascular:      Rate and Rhythm: Rhythm irregular.      Heart sounds: Murmur heard.   Pulmonary:      Effort: No respiratory distress.      Breath sounds: Normal breath sounds. No wheezing or rales.   Abdominal:      General: There is no distension.      Palpations: Abdomen is soft.      Tenderness: There is no abdominal tenderness.      Comments: PD catheter dressing intact   Musculoskeletal:         General: Swelling and deformity (bilateral AKA) present.      Cervical back: Normal range of motion and neck supple.      Right lower leg: Edema present.      Left lower leg: Edema present.   Skin:     General: Skin is warm and dry.   Neurological:      Mental Status: She is alert.      Comments: Lethargic, able to respond to questions          Significant Labs:  CBC:   Recent Labs   Lab 07/18/25  1214   WBC 5.78   RBC 3.27*   HGB 10.2*   HCT 32.8*      *   MCH 31.2*   MCHC 31.1*     CMP:   Recent Labs   Lab 07/18/25  1214   *   CALCIUM 7.8*   ALBUMIN 2.7*   PROT 7.5      K 4.6   CO2 25      BUN 36*   CREATININE 4.8*   ALKPHOS 122   ALT 7*   AST 20   BILITOT 0.6       Assessment/Plan:     Renal/  ESRD (end stage renal disease)  ESRD on iHD MWF; transitioning to CCPD and currently undergoing training as an OP  [employer]    Recently admitted to [facility] and  with Klebsiella ESBL UTI, completed abx course of Ertapenem x 5 days on 7/12.  Also was on IP Abx for suspected peritonitis with Vanc/Ceftaz (Abx  completed upon discharge)    7/3:  Cell counts 222; SEGs 32%  7/7:  Cell counts 16; SEGs 1%  PD Cx NGTD    Plan/Recommendations:  -We will plan to instill 1L of 1.5% dextrose to dwell x 2 hours  -will collect Cell counts, Cx (including fungal) with gram stain from this sample for analysis  -please continue on Anti-fungal prophylaxis during the duration of her Abx treatment  -we will plan for iHD today and continue 3x week dailysis while IP  -will f/u with fluid analysis for PD fluid  -place on renal diet, will add protein supplements with her meals  -continue Miralax while IP to avoid constipation    Anemia of ESRD  -@ goal;  will continue trending    Bone mineral management  -continue cinacalcet  -continue phos binders          Agustin Vang NP  Nephrology  [facility]

## 2025-07-19 NOTE — ASSESSMENT & PLAN NOTE
Patient's FSGs are controlled on current medication regimen.  Last A1c reviewed-   Lab Results   Component Value Date    HGBA1C 6.2 (H) 07/03/2025     Most recent fingerstick glucose reviewed-   Recent Labs   Lab 07/18/25 2219   POCTGLUCOSE 298*     Current correctional scale  Low  Maintain anti-hyperglycemic dose as follows-   Antihyperglycemics (From admission, onward)      Start     Stop Route Frequency Ordered    07/19/25 0745  insulin aspart U-100 pen 3 Units         -- SubQ 3 times daily with meals 07/19/25 0310 07/18/25 2230  insulin glargine U-100 (Lantus) pen 5 Units         -- SubQ Nightly 07/18/25 2126 07/18/25 2227  insulin aspart U-100 pen 0-5 Units         -- SubQ Before meals & nightly PRN 07/18/25 2127          Conservative dosing of insulin during admission

## 2025-07-19 NOTE — HPI
59-year-old woman with ESRD on HD, undergoing PD training, CAD status post CABG, P AFib, status post bilateral AKA presents to the emergency department for concerns of confusion.      She was recently admitted to [facility] from July 3rd to July 12th, diagnosed with ESBL Klebsiella UTI, seen by infectious diseases and was treated with 5 days of ertapenem inpatient, discharge summary indicates her mental status was at baseline at discharge.  Tonight her  reports that she has not had resolution of mental status change or confusion.   provides majority of history.    He reports her last PT session was July 11th, she had ambulatory hemodialysis on July 14th and July 16th, did not attend dialysis today as  brought her to the emergency department, he also noted patient had cloudy urine.  On review of medication reconciliation Lyrica was advised to be on pause at recent discharge but  states he is continue to give her once daily 75 mg dose.  He denies that patient has had bouts of hypoglycemia and/or hypotension.  She has not been on any outpatient antibiotics.    ED Treatment: 500c normal saline.

## 2025-07-19 NOTE — PLAN OF CARE
Problem: Infection  Goal: Absence of Infection Signs and Symptoms  Outcome: Progressing     Problem: Adult Inpatient Plan of Care  Goal: Plan of Care Review  Outcome: Progressing  Goal: Patient-Specific Goal (Individualized)  Outcome: Progressing  Goal: Absence of Hospital-Acquired Illness or Injury  Outcome: Progressing  Goal: Optimal Comfort and Wellbeing  Outcome: Progressing  Goal: Readiness for Transition of Care  Outcome: Progressing     Problem: Diabetes Comorbidity  Goal: Blood Glucose Level Within Targeted Range  Outcome: Progressing     Problem: Sepsis/Septic Shock  Goal: Optimal Coping  Outcome: Progressing  Goal: Absence of Bleeding  Outcome: Progressing  Goal: Blood Glucose Level Within Targeted Range  Outcome: Progressing  Goal: Absence of Infection Signs and Symptoms  Outcome: Progressing  Goal: Optimal Nutrition Intake  Outcome: Progressing     Problem: Wound  Goal: Optimal Coping  Outcome: Progressing  Goal: Optimal Functional Ability  Outcome: Progressing  Goal: Absence of Infection Signs and Symptoms  Outcome: Progressing  Goal: Improved Oral Intake  Outcome: Progressing  Goal: Optimal Pain Control and Function  Outcome: Progressing  Goal: Skin Health and Integrity  Outcome: Progressing  Goal: Optimal Wound Healing  Outcome: Progressing     Problem: Fall Injury Risk  Goal: Absence of Fall and Fall-Related Injury  Outcome: Progressing     Problem: Skin Injury Risk Increased  Goal: Skin Health and Integrity  Outcome: Progressing     Problem: Pain Acute  Goal: Optimal Pain Control and Function  Outcome: Progressing     Problem: Electrolyte Imbalance  Goal: Electrolyte Balance  Outcome: Progressing

## 2025-07-19 NOTE — ASSESSMENT & PLAN NOTE
-patient again has high pyuria - urine gram stain and culture are pending - she was recently treated with ertapenem.    -start empiric erta pending cx  -hold lyrica at this time   -per last PCP note in March 2025 patient has had ongoing cognitive & memory issues, vascular dementia on problem list, refer for neurocognitive testing on discharge.

## 2025-07-20 LAB
ALBUMIN SERPL BCP-MCNC: 2.6 G/DL (ref 3.5–5.2)
ANION GAP (OHS): 10 MMOL/L (ref 8–16)
BACTERIA UR CULT: ABNORMAL
BUN SERPL-MCNC: 50 MG/DL (ref 6–20)
CALCIUM SERPL-MCNC: 7.7 MG/DL (ref 8.7–10.5)
CHLORIDE SERPL-SCNC: 108 MMOL/L (ref 95–110)
CO2 SERPL-SCNC: 21 MMOL/L (ref 23–29)
CREAT SERPL-MCNC: 4.6 MG/DL (ref 0.5–1.4)
FUNGUS SPEC CULT: NORMAL
FUNGUS SPEC CULT: NORMAL
GFR SERPLBLD CREATININE-BSD FMLA CKD-EPI: 10 ML/MIN/1.73/M2
GLUCOSE SERPL-MCNC: 324 MG/DL (ref 70–110)
PHOSPHATE SERPL-MCNC: 3.2 MG/DL (ref 2.7–4.5)
POCT GLUCOSE: 259 MG/DL (ref 70–110)
POCT GLUCOSE: 286 MG/DL (ref 70–110)
POCT GLUCOSE: 322 MG/DL (ref 70–110)
POCT GLUCOSE: 347 MG/DL (ref 70–110)
POTASSIUM SERPL-SCNC: 4.4 MMOL/L (ref 3.5–5.1)
SODIUM SERPL-SCNC: 139 MMOL/L (ref 136–145)

## 2025-07-20 PROCEDURE — 36415 COLL VENOUS BLD VENIPUNCTURE: CPT | Mod: HCNC | Performed by: HOSPITALIST

## 2025-07-20 PROCEDURE — 21400001 HC TELEMETRY ROOM: Mod: HCNC

## 2025-07-20 PROCEDURE — 25000003 PHARM REV CODE 250: Mod: HCNC | Performed by: HOSPITALIST

## 2025-07-20 PROCEDURE — 63700000 PHARM REV CODE 250 ALT 637 W/O HCPCS: Mod: HCNC | Performed by: HOSPITALIST

## 2025-07-20 PROCEDURE — 25000003 PHARM REV CODE 250: Mod: HCNC | Performed by: NURSE PRACTITIONER

## 2025-07-20 PROCEDURE — 80069 RENAL FUNCTION PANEL: CPT | Mod: HCNC | Performed by: HOSPITALIST

## 2025-07-20 PROCEDURE — 94761 N-INVAS EAR/PLS OXIMETRY MLT: CPT | Mod: HCNC

## 2025-07-20 PROCEDURE — 27000207 HC ISOLATION: Mod: HCNC

## 2025-07-20 PROCEDURE — 63600175 PHARM REV CODE 636 W HCPCS: Mod: HCNC | Performed by: HOSPITALIST

## 2025-07-20 RX ORDER — INSULIN ASPART 100 [IU]/ML
3 INJECTION, SOLUTION INTRAVENOUS; SUBCUTANEOUS
Status: DISCONTINUED | OUTPATIENT
Start: 2025-07-20 | End: 2025-07-22 | Stop reason: HOSPADM

## 2025-07-20 RX ADMIN — SODIUM BICARBONATE 650 MG TABLET 650 MG: at 08:07

## 2025-07-20 RX ADMIN — INSULIN ASPART 1 UNITS: 100 INJECTION, SOLUTION INTRAVENOUS; SUBCUTANEOUS at 10:07

## 2025-07-20 RX ADMIN — ATORVASTATIN CALCIUM 80 MG: 40 TABLET, FILM COATED ORAL at 08:07

## 2025-07-20 RX ADMIN — CLOPIDOGREL 75 MG: 75 TABLET ORAL at 08:07

## 2025-07-20 RX ADMIN — ERYTHROMYCIN: 5 OINTMENT OPHTHALMIC at 07:07

## 2025-07-20 RX ADMIN — FLUCONAZOLE 100 MG: 100 TABLET ORAL at 08:07

## 2025-07-20 RX ADMIN — FAMOTIDINE 20 MG: 20 TABLET, FILM COATED ORAL at 10:07

## 2025-07-20 RX ADMIN — SERTRALINE 100 MG: 100 TABLET, FILM COATED ORAL at 08:07

## 2025-07-20 RX ADMIN — POLYETHYLENE GLYCOL 3350 17 G: 17 POWDER, FOR SOLUTION ORAL at 08:07

## 2025-07-20 RX ADMIN — Medication 1 CAPSULE: at 10:07

## 2025-07-20 RX ADMIN — APIXABAN 5 MG: 5 TABLET, FILM COATED ORAL at 08:07

## 2025-07-20 RX ADMIN — INSULIN ASPART 3 UNITS: 100 INJECTION, SOLUTION INTRAVENOUS; SUBCUTANEOUS at 04:07

## 2025-07-20 RX ADMIN — APIXABAN 5 MG: 5 TABLET, FILM COATED ORAL at 10:07

## 2025-07-20 RX ADMIN — INSULIN ASPART 3 UNITS: 100 INJECTION, SOLUTION INTRAVENOUS; SUBCUTANEOUS at 08:07

## 2025-07-20 RX ADMIN — SEVELAMER CARBONATE 2.4 G: 2400 FOR SUSPENSION ORAL at 08:07

## 2025-07-20 RX ADMIN — TRAZODONE HYDROCHLORIDE 25 MG: 50 TABLET ORAL at 10:07

## 2025-07-20 RX ADMIN — CARVEDILOL 6.25 MG: 6.25 TABLET, FILM COATED ORAL at 08:07

## 2025-07-20 RX ADMIN — INSULIN ASPART 4 UNITS: 100 INJECTION, SOLUTION INTRAVENOUS; SUBCUTANEOUS at 11:07

## 2025-07-20 RX ADMIN — ERYTHROMYCIN: 5 OINTMENT OPHTHALMIC at 11:07

## 2025-07-20 RX ADMIN — SODIUM BICARBONATE 650 MG TABLET 650 MG: at 10:07

## 2025-07-20 RX ADMIN — SEVELAMER CARBONATE 2.4 G: 2400 FOR SUSPENSION ORAL at 11:07

## 2025-07-20 RX ADMIN — SEVELAMER CARBONATE 2.4 G: 2400 FOR SUSPENSION ORAL at 04:07

## 2025-07-20 RX ADMIN — FUROSEMIDE 40 MG: 40 TABLET ORAL at 08:07

## 2025-07-20 RX ADMIN — ERYTHROMYCIN: 5 OINTMENT OPHTHALMIC at 06:07

## 2025-07-20 RX ADMIN — INSULIN ASPART 4 UNITS: 100 INJECTION, SOLUTION INTRAVENOUS; SUBCUTANEOUS at 04:07

## 2025-07-20 RX ADMIN — FUROSEMIDE 40 MG: 40 TABLET ORAL at 10:07

## 2025-07-20 RX ADMIN — INSULIN ASPART 3 UNITS: 100 INJECTION, SOLUTION INTRAVENOUS; SUBCUTANEOUS at 01:07

## 2025-07-20 NOTE — SUBJECTIVE & OBJECTIVE
Interval History: patient awake, alert, sitting up in bed, watching TV. PD cell count, gram stain, and cx obtained yesterday which did not reflect e/o bacterial peritonitis. Awaiting urine culture, anticipate discharge home tomorrow. Pt denies any complaints, feels well, and looks forward to this    Review of Systems   Gastrointestinal:  Negative for abdominal pain, constipation and diarrhea.   Genitourinary:  Negative for dysuria, enuresis and flank pain.     Objective:     Vital Signs (Most Recent):  Temp: 98.3 °F (36.8 °C) (07/20/25 0744)  Pulse: 81 (07/20/25 0744)  Resp: 18 (07/20/25 0744)  BP: (!) 128/56 (07/20/25 0744)  SpO2: 96 % (07/20/25 0744) Vital Signs (24h Range):  Temp:  [97.9 °F (36.6 °C)-98.5 °F (36.9 °C)] 98.3 °F (36.8 °C)  Pulse:  [66-83] 81  Resp:  [16-18] 18  SpO2:  [94 %-100 %] 96 %  BP: ()/(47-70) 128/56     Weight: 60.5 kg (133 lb 6.1 oz)  Body mass index is 26.05 kg/m².    Intake/Output Summary (Last 24 hours) at 7/20/2025 1141  Last data filed at 7/19/2025 2128  Gross per 24 hour   Intake 940 ml   Output 1355 ml   Net -415 ml         Physical Exam      Constitutional:       General: She is not in acute distress.  Eyes:      General: No scleral icterus.  Cardiovascular:      Rate and Rhythm: Normal rate and regular rhythm.   Pulmonary:      Effort: Pulmonary effort is normal. No respiratory distress.      Breath sounds: No wheezing.   Abdominal:      General: There is no distension.      Palpations: Abdomen is soft.      Tenderness: There is no abdominal tenderness.   PD catheter c/d/i  Musculoskeletal:      Comments: S/p bilateral AKA - no pitting edema in thighs   Neurological:      Comments: alert, sitting up in bed watching TV, oriented x 3    Significant Labs: All pertinent labs within the past 24 hours have been reviewed.    Significant Imaging: I have reviewed all pertinent imaging results/findings within the past 24 hours.

## 2025-07-20 NOTE — PROGRESS NOTES
[facility] [address] [place] [address] Medicine  Progress Note    Patient Name: Suyapa Connelly  MRN: [id number]  Patient Class: IP- Inpatient   Admission Date: 7/18/2025  Length of Stay: 1 days  Attending Physician: Frances Bee MD  Primary Care Provider: Vanessa Noel MD        Subjective     Principal Problem:Encephalopathy        HPI:  59-year-old woman with ESRD on HD, undergoing PD training, CAD status post CABG, P AFib, status post bilateral AKA presents to the emergency department for concerns of confusion.      She was recently admitted to [facility] from July 3rd to July 12th, diagnosed with ESBL Klebsiella UTI, seen by infectious diseases and was treated with 5 days of ertapenem inpatient, discharge summary indicates her mental status was at baseline at discharge.  Tonight her  reports that she has not had resolution of mental status change or confusion.   provides majority of history.    He reports her last PT session was July 11th, she had ambulatory hemodialysis on July 14th and July 16th, did not attend dialysis today as  brought her to the emergency department, he also noted patient had cloudy urine.  On review of medication reconciliation Lyrica was advised to be on pause at recent discharge but  states he is continue to give her once daily 75 mg dose.  He denies that patient has had bouts of hypoglycemia and/or hypotension.  She has not been on any outpatient antibiotics.    ED Treatment: 500c normal saline.     Overview/Hospital Course:  Encephalopathy. UA reflexed to culture, started on empiric ertapenem although ID recommends looking for alternate source of mentation change. Lyrica has been discontinued and discussed with patient's significant other. Nephrology consulted for PD/HD status. PD cell count, gram stain, and cx obtained which did not reflect e/o bacterial peritonitis.     Interval History: patient awake, alert, sitting up in bed, watching  TV. PD cell count, gram stain, and cx obtained yesterday which did not reflect e/o bacterial peritonitis. Awaiting urine culture, anticipate discharge home tomorrow. Pt denies any complaints, feels well, and looks forward to this    Review of Systems   Gastrointestinal:  Negative for abdominal pain, constipation and diarrhea.   Genitourinary:  Negative for dysuria, enuresis and flank pain.     Objective:     Vital Signs (Most Recent):  Temp: 98.3 °F (36.8 °C) (07/20/25 0744)  Pulse: 81 (07/20/25 0744)  Resp: 18 (07/20/25 0744)  BP: (!) 128/56 (07/20/25 0744)  SpO2: 96 % (07/20/25 0744) Vital Signs (24h Range):  Temp:  [97.9 °F (36.6 °C)-98.5 °F (36.9 °C)] 98.3 °F (36.8 °C)  Pulse:  [66-83] 81  Resp:  [16-18] 18  SpO2:  [94 %-100 %] 96 %  BP: ()/(47-70) 128/56     Weight: 60.5 kg (133 lb 6.1 oz)  Body mass index is 26.05 kg/m².    Intake/Output Summary (Last 24 hours) at 7/20/2025 1141  Last data filed at 7/19/2025 2128  Gross per 24 hour   Intake 940 ml   Output 1355 ml   Net -415 ml         Physical Exam      Constitutional:       General: She is not in acute distress.  Eyes:      General: No scleral icterus.  Cardiovascular:      Rate and Rhythm: Normal rate and regular rhythm.   Pulmonary:      Effort: Pulmonary effort is normal. No respiratory distress.      Breath sounds: No wheezing.   Abdominal:      General: There is no distension.      Palpations: Abdomen is soft.      Tenderness: There is no abdominal tenderness.   PD catheter c/d/i  Musculoskeletal:      Comments: S/p bilateral AKA - no pitting edema in thighs   Neurological:      Comments: alert, sitting up in bed watching TV, oriented x 3    Significant Labs: All pertinent labs within the past 24 hours have been reviewed.    Significant Imaging: I have reviewed all pertinent imaging results/findings within the past 24 hours.      Assessment & Plan  Encephalopathy  Urinary tract infection due to ESBL Klebsiella  -patient again has high pyuria -  urine gram stain and culture are pending - she was recently treated with ertapenem.    -start empiric erta pending cx  -hold lyrica at this time   -per last PCP note in March 2025 patient has had ongoing cognitive & memory issues, vascular dementia on problem list, refer for neurocognitive testing on discharge.   ESRD (end stage renal disease)  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, on chronic dialysis  -consult nephrology  -no acute BUN elevation  -continue cinacalcet, sevelamer for CKD-MBD  -continue sodium bicarbonte.   -PD fluid cell count, gram stain, and cx does not reflect infectious process  Coronary artery disease involving coronary bypass graft with angina pectoris  Patient with known CAD s/p CABG, which is controlled Will continue Plavix and Statin and monitor for S/Sx of angina/ACS. Continue to monitor on telemetry.   Paroxysmal atrial fibrillation  Patient has paroxysmal (<7 days) atrial fibrillation. Patient is currently in sinus rhythm. [id number] Score: 3. The patients heart rate in the last 24 hours is as follows:  Pulse  Min: 66  Max: 83     Antiarrhythmics       Anticoagulants  apixaban tablet 5 mg, 2 times daily, Oral  heparin (porcine) injection 1,000 Units, As needed (PRN), Intra-Catheter    Plan  - Replete lytes with a goal of K>4, Mg >2  - Patient is anticoagulated, see medications listed above.  - Patient's afib is currently controlled  - continue carvedilol.       Primary hypertension  Chronic combined systolic and diastolic congestive heart failure  Patient's blood pressure range in the last 24 hours was: BP  Min: 99/49  Max: 149/62.The patient's inpatient anti-hypertensive regimen is listed below:  Current Antihypertensives  furosemide tablet 40 mg, 2 times daily, Oral    Plan  - Patient presented with hypotension today s/p 500cc IV fluids in ED - normotensive tonight   -will continue furosemide.  Holding - coreg, entresto, hydrlazine  and isosorbide - add back as clinically appropriate.   Anemia due to chronic kidney disease, on chronic dialysis  Chronic and stable  Type 2 diabetes mellitus with hyperglycemia, with long-term current use of insulin  Patient's FSGs are controlled on current medication regimen.  Last A1c reviewed-   Lab Results   Component Value Date    HGBA1C 6.2 (H) 07/03/2025     Most recent fingerstick glucose reviewed-   Recent Labs   Lab 07/19/25  1527 07/19/25  1729 07/19/25  2058 07/20/25  1103   POCTGLUCOSE 205* 159* 161* 347*     Current correctional scale  Low  Maintain anti-hyperglycemic dose as follows-   Antihyperglycemics (From admission, onward)      Start     Stop Route Frequency Ordered    07/18/25 2227  insulin aspart U-100 pen 0-5 Units         -- SubQ Before meals & nightly PRN 07/18/25 2127          Conservative dosing of insulin during admission   Debility  Patient with 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 The patient's latest AMPAC (Activity Measure for Post Acute Care) Score is listed below.    AM-PAC Score - How much help does the patient need for each activity listed  Basic Mobility Total Score: 18  Turning over in bed (including adjusting bedclothes, sheets and blankets)?: A little  Sitting down on and standing up from a chair with arms (e.g., wheelchair, bedside commode, etc.): A little  Moving from lying on back to sitting on the side of the bed?: A little  Moving to and from a bed to a chair (including a wheelchair)?: A little  Need to walk in hospital room?: A little  Climbing 3-5 steps with a railing?: A little    Plan  - Progressive mobility protocol initated  - Fall precaution  CAROLIN (generalized anxiety disorder)  Continue home sertraline. Nightly trazodone.   S/P AKA (above knee amputation) bilateral  No edema/pain/ulceration noted.   VTE Risk Mitigation (From admission, onward)           Ordered     heparin (porcine) injection 1,000 Units  As needed  (PRN)         07/19/25 1846     apixaban tablet 5 mg  2 times daily         07/18/25 2126                    Discharge Planning   DEVAN: 7/21/2025     Code Status: Full Code   Medical Readiness for Discharge Date:   Discharge Plan A: Home with family                        Frances Bee MD  Department of Hospital Medicine   [facility] - Observation 11H

## 2025-07-20 NOTE — ASSESSMENT & PLAN NOTE
-consult nephrology  -no acute BUN elevation  -continue cinacalcet, sevelamer for CKD-MBD  -continue sodium bicarbonte.   -PD fluid cell count, gram stain, and cx does not reflect infectious process

## 2025-07-20 NOTE — ASSESSMENT & PLAN NOTE
Patient's FSGs are controlled on current medication regimen.  Last A1c reviewed-   Lab Results   Component Value Date    HGBA1C 6.2 (H) 07/03/2025     Most recent fingerstick glucose reviewed-   Recent Labs   Lab 07/19/25  1527 07/19/25  1729 07/19/25 2058 07/20/25  1103   POCTGLUCOSE 205* 159* 161* 347*     Current correctional scale  Low  Maintain anti-hyperglycemic dose as follows-   Antihyperglycemics (From admission, onward)      Start     Stop Route Frequency Ordered    07/18/25 2227  insulin aspart U-100 pen 0-5 Units         -- SubQ Before meals & nightly PRN 07/18/25 2127          Conservative dosing of insulin during admission

## 2025-07-20 NOTE — ASSESSMENT & PLAN NOTE
Patient has paroxysmal (<7 days) atrial fibrillation. Patient is currently in sinus rhythm. [id number] Score: 3. The patients heart rate in the last 24 hours is as follows:  Pulse  Min: 66  Max: 83     Antiarrhythmics       Anticoagulants  apixaban tablet 5 mg, 2 times daily, Oral  heparin (porcine) injection 1,000 Units, As needed (PRN), Intra-Catheter    Plan  - Replete lytes with a goal of K>4, Mg >2  - Patient is anticoagulated, see medications listed above.  - Patient's afib is currently controlled  - continue carvedilol.

## 2025-07-20 NOTE — ASSESSMENT & PLAN NOTE
Patient's blood pressure range in the last 24 hours was: BP  Min: 99/49  Max: 149/62.The patient's inpatient anti-hypertensive regimen is listed below:  Current Antihypertensives  furosemide tablet 40 mg, 2 times daily, Oral    Plan  - Patient presented with hypotension today s/p 500cc IV fluids in ED - normotensive tonight   -will continue furosemide.  Holding - coreg, entresto, hydrlazine and isosorbide - add back as clinically appropriate.

## 2025-07-21 VITALS
DIASTOLIC BLOOD PRESSURE: 61 MMHG | BODY MASS INDEX: 26.19 KG/M2 | WEIGHT: 133.38 LBS | RESPIRATION RATE: 18 BRPM | TEMPERATURE: 98 F | HEIGHT: 60 IN | OXYGEN SATURATION: 99 % | HEART RATE: 72 BPM | SYSTOLIC BLOOD PRESSURE: 134 MMHG

## 2025-07-21 PROBLEM — N39.0 URINARY TRACT INFECTION DUE TO ESBL KLEBSIELLA: Status: RESOLVED | Noted: 2022-03-15 | Resolved: 2025-07-21

## 2025-07-21 PROBLEM — B96.89 URINARY TRACT INFECTION DUE TO ESBL KLEBSIELLA: Status: RESOLVED | Noted: 2022-03-15 | Resolved: 2025-07-21

## 2025-07-21 LAB
ABSOLUTE EOSINOPHIL (OHS): 0.19 K/UL
ABSOLUTE MONOCYTE (OHS): 0.78 K/UL (ref 0.3–1)
ABSOLUTE NEUTROPHIL COUNT (OHS): 3.94 K/UL (ref 1.8–7.7)
ALBUMIN SERPL BCP-MCNC: 2.6 G/DL (ref 3.5–5.2)
ANION GAP (OHS): 8 MMOL/L (ref 8–16)
BASOPHILS # BLD AUTO: 0.06 K/UL
BASOPHILS NFR BLD AUTO: 1 %
BUN SERPL-MCNC: 73 MG/DL (ref 6–20)
CALCIUM SERPL-MCNC: 7.8 MG/DL (ref 8.7–10.5)
CHLORIDE SERPL-SCNC: 105 MMOL/L (ref 95–110)
CO2 SERPL-SCNC: 22 MMOL/L (ref 23–29)
CREAT SERPL-MCNC: 5.4 MG/DL (ref 0.5–1.4)
ERYTHROCYTE [DISTWIDTH] IN BLOOD BY AUTOMATED COUNT: 17.4 % (ref 11.5–14.5)
FERRITIN SERPL-MCNC: 881 NG/ML (ref 20–300)
GFR SERPLBLD CREATININE-BSD FMLA CKD-EPI: 9 ML/MIN/1.73/M2
GLUCOSE SERPL-MCNC: 267 MG/DL (ref 70–110)
HCT VFR BLD AUTO: 28.4 % (ref 37–48.5)
HGB BLD-MCNC: 8.9 GM/DL (ref 12–16)
IMM GRANULOCYTES # BLD AUTO: 0.02 K/UL (ref 0–0.04)
IMM GRANULOCYTES NFR BLD AUTO: 0.3 % (ref 0–0.5)
IRON SATN MFR SERPL: 39 % (ref 20–50)
IRON SERPL-MCNC: 92 UG/DL (ref 30–160)
LYMPHOCYTES # BLD AUTO: 1.09 K/UL (ref 1–4.8)
MCH RBC QN AUTO: 31.3 PG (ref 27–31)
MCHC RBC AUTO-ENTMCNC: 31.3 G/DL (ref 32–36)
MCV RBC AUTO: 100 FL (ref 82–98)
NUCLEATED RBC (/100WBC) (OHS): 0 /100 WBC
PHOSPHATE SERPL-MCNC: 3.4 MG/DL (ref 2.7–4.5)
PLATELET # BLD AUTO: 152 K/UL (ref 150–450)
PMV BLD AUTO: 12.5 FL (ref 9.2–12.9)
POCT GLUCOSE: 173 MG/DL (ref 70–110)
POCT GLUCOSE: 286 MG/DL (ref 70–110)
POCT GLUCOSE: 346 MG/DL (ref 70–110)
POTASSIUM SERPL-SCNC: 4.8 MMOL/L (ref 3.5–5.1)
RBC # BLD AUTO: 2.84 M/UL (ref 4–5.4)
RELATIVE EOSINOPHIL (OHS): 3.1 %
RELATIVE LYMPHOCYTE (OHS): 17.9 % (ref 18–48)
RELATIVE MONOCYTE (OHS): 12.8 % (ref 4–15)
RELATIVE NEUTROPHIL (OHS): 64.9 % (ref 38–73)
SODIUM SERPL-SCNC: 135 MMOL/L (ref 136–145)
TIBC SERPL-MCNC: 235 UG/DL (ref 250–450)
TRANSFERRIN SERPL-MCNC: 159 MG/DL (ref 200–375)
WBC # BLD AUTO: 6.08 K/UL (ref 3.9–12.7)

## 2025-07-21 PROCEDURE — 80100014 HC HEMODIALYSIS 1:1: Mod: HCNC

## 2025-07-21 PROCEDURE — 84466 ASSAY OF TRANSFERRIN: CPT | Mod: HCNC | Performed by: INTERNAL MEDICINE

## 2025-07-21 PROCEDURE — 90935 HEMODIALYSIS ONE EVALUATION: CPT | Mod: HCNC,,, | Performed by: INTERNAL MEDICINE

## 2025-07-21 PROCEDURE — 63600175 PHARM REV CODE 636 W HCPCS: Mod: HCNC | Performed by: HOSPITALIST

## 2025-07-21 PROCEDURE — 85025 COMPLETE CBC W/AUTO DIFF WBC: CPT | Mod: HCNC | Performed by: HOSPITALIST

## 2025-07-21 PROCEDURE — 82040 ASSAY OF SERUM ALBUMIN: CPT | Mod: HCNC | Performed by: HOSPITALIST

## 2025-07-21 PROCEDURE — 25000003 PHARM REV CODE 250: Mod: HCNC | Performed by: NURSE PRACTITIONER

## 2025-07-21 PROCEDURE — 25000003 PHARM REV CODE 250: Mod: HCNC | Performed by: INTERNAL MEDICINE

## 2025-07-21 PROCEDURE — 63600175 PHARM REV CODE 636 W HCPCS: Mod: HCNC | Performed by: INTERNAL MEDICINE

## 2025-07-21 PROCEDURE — 63700000 PHARM REV CODE 250 ALT 637 W/O HCPCS: Mod: HCNC | Performed by: HOSPITALIST

## 2025-07-21 PROCEDURE — 25000003 PHARM REV CODE 250: Mod: HCNC | Performed by: HOSPITALIST

## 2025-07-21 PROCEDURE — 82728 ASSAY OF FERRITIN: CPT | Mod: HCNC | Performed by: INTERNAL MEDICINE

## 2025-07-21 PROCEDURE — 36415 COLL VENOUS BLD VENIPUNCTURE: CPT | Mod: HCNC | Performed by: HOSPITALIST

## 2025-07-21 RX ORDER — INSULIN GLARGINE 100 [IU]/ML
5 INJECTION, SOLUTION SUBCUTANEOUS DAILY
Status: DISCONTINUED | OUTPATIENT
Start: 2025-07-21 | End: 2025-07-22 | Stop reason: HOSPADM

## 2025-07-21 RX ORDER — SODIUM CHLORIDE 9 MG/ML
INJECTION, SOLUTION INTRAVENOUS
Status: CANCELLED | OUTPATIENT
Start: 2025-07-21

## 2025-07-21 RX ORDER — SODIUM CHLORIDE 9 MG/ML
INJECTION, SOLUTION INTRAVENOUS ONCE
Status: COMPLETED | OUTPATIENT
Start: 2025-07-21 | End: 2025-07-21

## 2025-07-21 RX ORDER — HEPARIN SODIUM 1000 [USP'U]/ML
1000 INJECTION, SOLUTION INTRAVENOUS; SUBCUTANEOUS
Status: DISCONTINUED | OUTPATIENT
Start: 2025-07-21 | End: 2025-07-22 | Stop reason: HOSPADM

## 2025-07-21 RX ADMIN — SODIUM BICARBONATE 650 MG TABLET 650 MG: at 08:07

## 2025-07-21 RX ADMIN — APIXABAN 5 MG: 5 TABLET, FILM COATED ORAL at 08:07

## 2025-07-21 RX ADMIN — ALLOPURINOL 50 MG: 300 TABLET ORAL at 08:07

## 2025-07-21 RX ADMIN — FLUCONAZOLE 100 MG: 100 TABLET ORAL at 08:07

## 2025-07-21 RX ADMIN — INSULIN ASPART 3 UNITS: 100 INJECTION, SOLUTION INTRAVENOUS; SUBCUTANEOUS at 08:07

## 2025-07-21 RX ADMIN — SEVELAMER CARBONATE 2.4 G: 2400 FOR SUSPENSION ORAL at 08:07

## 2025-07-21 RX ADMIN — SODIUM CHLORIDE: 9 INJECTION, SOLUTION INTRAVENOUS at 09:07

## 2025-07-21 RX ADMIN — CLOPIDOGREL 75 MG: 75 TABLET ORAL at 08:07

## 2025-07-21 RX ADMIN — HEPARIN SODIUM 1000 UNITS: 1000 INJECTION INTRAVENOUS; SUBCUTANEOUS at 01:07

## 2025-07-21 RX ADMIN — ERYTHROMYCIN: 5 OINTMENT OPHTHALMIC at 09:07

## 2025-07-21 RX ADMIN — ERYTHROMYCIN: 5 OINTMENT OPHTHALMIC at 03:07

## 2025-07-21 RX ADMIN — INSULIN ASPART 2 UNITS: 100 INJECTION, SOLUTION INTRAVENOUS; SUBCUTANEOUS at 09:07

## 2025-07-21 RX ADMIN — SERTRALINE 100 MG: 100 TABLET, FILM COATED ORAL at 08:07

## 2025-07-21 RX ADMIN — INSULIN GLARGINE 5 UNITS: 100 INJECTION, SOLUTION SUBCUTANEOUS at 08:07

## 2025-07-21 RX ADMIN — ATORVASTATIN CALCIUM 80 MG: 40 TABLET, FILM COATED ORAL at 08:07

## 2025-07-21 RX ADMIN — POLYETHYLENE GLYCOL 3350 17 G: 17 POWDER, FOR SOLUTION ORAL at 08:07

## 2025-07-21 RX ADMIN — FUROSEMIDE 40 MG: 40 TABLET ORAL at 08:07

## 2025-07-21 RX ADMIN — INSULIN ASPART 3 UNITS: 100 INJECTION, SOLUTION INTRAVENOUS; SUBCUTANEOUS at 04:07

## 2025-07-21 RX ADMIN — Medication 1 CAPSULE: at 08:07

## 2025-07-21 NOTE — PLAN OF CARE
Problem: Infection  Goal: Absence of Infection Signs and Symptoms  Outcome: Met     Problem: Adult Inpatient Plan of Care  Goal: Plan of Care Review  Outcome: Met  Goal: Patient-Specific Goal (Individualized)  Outcome: Met  Goal: Absence of Hospital-Acquired Illness or Injury  Outcome: Met  Goal: Optimal Comfort and Wellbeing  Outcome: Met  Goal: Readiness for Transition of Care  Outcome: Met     Problem: Diabetes Comorbidity  Goal: Blood Glucose Level Within Targeted Range  Outcome: Met     Problem: Sepsis/Septic Shock  Goal: Optimal Coping  Outcome: Met  Goal: Absence of Bleeding  Outcome: Met  Goal: Blood Glucose Level Within Targeted Range  Outcome: Met  Goal: Absence of Infection Signs and Symptoms  Outcome: Met  Goal: Optimal Nutrition Intake  Outcome: Met     Problem: Wound  Goal: Optimal Coping  Outcome: Met  Goal: Optimal Functional Ability  Outcome: Met  Goal: Absence of Infection Signs and Symptoms  Outcome: Met  Goal: Improved Oral Intake  Outcome: Met  Goal: Optimal Pain Control and Function  Outcome: Met  Goal: Skin Health and Integrity  Outcome: Met  Goal: Optimal Wound Healing  Outcome: Met     Problem: Fall Injury Risk  Goal: Absence of Fall and Fall-Related Injury  Outcome: Met     Problem: Skin Injury Risk Increased  Goal: Skin Health and Integrity  Outcome: Met     Problem: Pain Acute  Goal: Optimal Pain Control and Function  Outcome: Met     Problem: Electrolyte Imbalance  Goal: Electrolyte Balance  Outcome: Met     Problem: Hemodialysis  Goal: Safe, Effective Therapy Delivery  Outcome: Met  Goal: Effective Tissue Perfusion  Outcome: Met  Goal: Absence of Infection Signs and Symptoms  Outcome: Met   Pt discharged, AVS given to ,education completed.  verbalized understanding. All questions and concerns were met. Leaving with Hector

## 2025-07-21 NOTE — PROGRESS NOTES
[facility]  Nephrology  Progress Note    Patient Name: Suyapa Connelly  MRN: [id number]  Admission Date: 7/18/2025  Hospital Length of Stay: 2 days  Attending Provider: Frances Bee MD   Primary Care Physician: Vanessa Noel MD  Principal Problem:Encephalopathy    Subjective:     HPI: Suyapa Connelly is a 58 yo female with hx of ESRD currently on iHD MWF (Transitioning to PD, currently in training), DM2, HTN, HLD, PAD s/p bilateral AKA, CAD, HFrEF, and pAF who presents with  to the ED on 7/18 with chief complaint of weakness, fatigue, and confusion.  Information obtained from records review and  at bedside.  [place] [facility] [place] and found to have a UTI with cx + for Klebsiella ESBL.  She underwent Abx therapy with Ertapenem x 5 days with last dose on 7/12.  Her symptoms have not improved, and this is the reason for admission this time.  During the last admission, PD fluid analysis was obtained on 7/3 noting a WBC of 222; SEGs 32%.  She was started on IP Vanc/Ceftaz that was then discontinued on discharge.  PD cultures negative to date.  A repeat PD fluid analysis done on 7/7 revealed a WBC of 16 with SEGs of 1%.  She denies any abdominal complaints on exam today, awake and able to respond to questions but does appear to be lethargic.  denies any fevers at home.  No PD training was completed this week and she was undergoing regular iHD on Monday and Wednesday but has missed her HD appointment on Friday as she presented to the hospital.  UCx pending, she has been restarted on Ertapenem along with fluconazole for fungal PD prophylaxis.  Nephrology is consulted for ESRD management while IP.        Interval History: Patient evaluated at bedside this morning. She is resting comfortably. No acute concerns at this time.     Review of patient's allergies indicates:   Allergen Reactions    Ciprofloxacin Itching    Pcn [penicillins]      Rash;  tolerated ceftriaxone on 1/13/20  Tolerating Augmentin November 2024     Current Facility-Administered Medications   Medication Frequency    acetaminophen tablet 650 mg Q8H PRN    allopurinol split tablet 50 mg Every other day    aluminum-magnesium hydroxide-simethicone 200-200-20 mg/5 mL suspension 30 mL QID PRN    apixaban tablet 5 mg BID    atorvastatin tablet 80 mg Daily    cinacalcet tablet 30 mg Every Mon, Wed, Fri    clopidogreL tablet 75 mg Daily    dextrose 50% injection 12.5 g PRN    dextrose 50% injection 25 g PRN    erythromycin 5 mg/gram (0.5 %) ophthalmic ointment Q6H    famotidine tablet 20 mg Q48H    fluconazole tablet 100 mg Daily    furosemide tablet 40 mg BID    glucagon (human recombinant) injection 1 mg PRN    glucose chewable tablet 16 g PRN    glucose chewable tablet 24 g PRN    insulin aspart U-100 pen 0-5 Units QID (AC + HS) PRN    insulin aspart U-100 pen 3 Units TIDWM    Lactobacillus rhamnosus GG capsule 1 capsule Daily    naloxone 0.4 mg/mL injection 0.02 mg PRN    ondansetron injection 4 mg Q8H PRN    polyethylene glycol packet 17 g Daily    prochlorperazine injection Soln 5 mg Q6H PRN    senna-docusate 8.6-50 mg per tablet 1 tablet BID PRN    sertraline tablet 100 mg Daily    sevelamer carbonate 2.4 gram powder 2.4 g TID WM    sodium bicarbonate tablet 650 mg BID    sodium chloride 0.9% flush 10 mL Q6H PRN    trazodone split tablet 25 mg QHS       Objective:     Vital Signs (Most Recent):  Temp: 97.4 °F (36.3 °C) (07/21/25 0604)  Pulse: 76 (07/21/25 0604)  Resp: 16 (07/21/25 0604)  BP: (!) 115/54 (07/21/25 0604)  SpO2: 95 % (07/21/25 0604) Vital Signs (24h Range):  Temp:  [97.4 °F (36.3 °C)-98.7 °F (37.1 °C)] 97.4 °F (36.3 °C)  Pulse:  [76-81] 76  Resp:  [16-18] 16  SpO2:  [95 %-100 %] 95 %  BP: (109-128)/(49-56) 115/54     Weight: 60.5 kg (133 lb 6.1 oz) (07/19/25 0400)  Body mass index is 26.05 kg/m².  Body surface area is 1.6 meters squared.    I/O last 3 completed shifts:  In: 1080  [P.O.:1080]  Out: 401 [Urine:400; Stool:1]     Physical Exam  Vitals and nursing note reviewed.   Constitutional:       Appearance: Normal appearance. She is not ill-appearing.   Eyes:      General: No scleral icterus.     Extraocular Movements: Extraocular movements intact.   Cardiovascular:      Rate and Rhythm: Normal rate and regular rhythm.      Pulses: Normal pulses.   Pulmonary:      Effort: Pulmonary effort is normal. No respiratory distress.   Musculoskeletal:         General: Deformity: bilateral AKA. Normal range of motion.   Skin:     General: Skin is warm and dry.   Neurological:      General: No focal deficit present.      Mental Status: She is alert. Mental status is at baseline.   Psychiatric:         Mood and Affect: Mood normal.          Significant Labs:  All labs within the past 24 hours have been reviewed.     Significant Imaging:  Labs: Reviewed  Assessment/Plan:     Neuro  * Encephalopathy  - Management per primary team    Renal/  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, on chronic dialysis  -continue cinacalcet  -continue phos binders    ESRD (end stage renal disease)  ESRD on iHD MWF; transitioning to [facility] and currently undergoing training as an OP  [employer]    Recently admitted to [facility] and dx with Klebsiella ESBL UTI, completed abx course of Ertapenem x 5 days on 7/12.  Also was on IP Abx for suspected peritonitis with Vanc/Ceftaz (Abx completed upon discharge)  7/3:  Cell counts 222; SEGs 32%  7/7:  Cell counts 16; SEGs 1%  PD Cx NGTD  7/19: Cell count 50; SEGs 14%   PD Cx pending       Plan/Recommendations:  -iHD today and continue 3x week dailysis while IP  -F/u with fluid analysis for PD fluid  -Strict I&O, pre and post-renal weights  -place on renal diet, will add protein supplements with her meals        Oncology  Anemia due to chronic kidney disease, on chronic dialysis  Hb 10.2 > 8.9 this AM. No evidence of bleeding. Not on  Epo at baseline     - Check iron studies  - Trend Hb; daily assessment for need for Epo with iHD for goal Hb 9-11 g/dL        Thank you for your consult. I will follow-up with patient. Please contact us if you have any additional questions.    Full attending attestation to follow.     Hayley Paniagua MD  Nephrology PGY-4  [facility] - [facility] 11[facility]

## 2025-07-21 NOTE — PLAN OF CARE
[facility]  Discharge Final Note    Primary Care Provider: Vanessa Noel MD    Expected Discharge Date: 7/21/2025    Final Discharge Note (most recent)       Final Note - 07/21/25 1500          Final Note    Assessment Type Final Discharge Note     Anticipated Discharge Disposition Home or Self Care     Hospital Resources/Appts/Education Provided Provided patient/caregiver with written discharge plan information;Provided education on problems/symptoms using teachback;Appointments scheduled and added to AVS        Post-Acute Status    Discharge Delays None known at this time                   Future Appointments   Date Time Provider Department Center   7/25/2025 11:00 AM Bonita Malin NP [facility] [place]   8/8/2025  8:30 AM Megan Grewal OD [facility] [place]   12/23/2025  9:00 AM Josue Huber MD [facility] [place]     Pt discharged home with no services.   CM arranged wheelchair van transport via [facility]. Requested  time is 1600 .  Requested  time does not guarantee arrival time.  If transport does not arrive by 1700 please contact assigned SW or on-call for assistance.  Feliciano Bridges RN,BSN          Important Message from Medicare  Important Message from Medicare regarding Discharge Appeal Rights: Signed/date by patient/caregiver     Date IMM was signed: 07/21/25  Time IMM was signed: 1232    Contact Info       Vanessa Noel MD   Specialty: Internal Medicine   Relationship: PCP - General    [address] [place] [id number]   Phone: [phone]       Next Steps: Schedule an appointment as soon as possible for a visit    [facility] NEUROLOGY   Specialty: Neurology    [address]  [facility] [address]   Phone: [phone]       Next Steps: Schedule an appointment as soon as possible for a visit

## 2025-07-21 NOTE — NURSING
Patient is in contact isolation. Patient arrived in stretcher . Both lumens of Hd catheter flushes good . Dialysis treatment started . Report received from primary Nurse .

## 2025-07-21 NOTE — ASSESSMENT & PLAN NOTE
Patient's FSGs are controlled on current medication regimen.  Last A1c reviewed-   Lab Results   Component Value Date    HGBA1C 6.2 (H) 07/03/2025     Most recent fingerstick glucose reviewed-   Recent Labs   Lab 07/20/25  1645 07/20/25  2102 07/21/25  0724   POCTGLUCOSE 322* 286* 286*     Current correctional scale  Low  Maintain anti-hyperglycemic dose as follows-   Antihyperglycemics (From admission, onward)      Start     Stop Route Frequency Ordered    07/21/25 0815  insulin glargine U-100 (Lantus) pen 5 Units         -- SubQ Daily 07/21/25 0808    07/20/25 1215  insulin aspart U-100 pen 3 Units         -- SubQ 3 times daily with meals 07/20/25 1200    07/18/25 2227  insulin aspart U-100 pen 0-5 Units         -- SubQ Before meals & nightly PRN 07/18/25 2127          Conservative dosing of insulin during admission

## 2025-07-21 NOTE — ASSESSMENT & PLAN NOTE
-patient again has high pyuria - urine gram stain and culture are pending - she was recently treated with ertapenem.    -hold lyrica and trazodone at this time   -per last PCP note in March 2025 patient has had ongoing cognitive & memory issues, vascular dementia on problem list, refer for neurocognitive testing on discharge.   -referral for neurology

## 2025-07-21 NOTE — ASSESSMENT & PLAN NOTE
Hb 10.2 > 8.9 this AM. No evidence of bleeding. Not on Epo at baseline     - Check iron studies  - Trend Hb; daily assessment for need for Epo with iHD for goal Hb 9-11 g/dL

## 2025-07-21 NOTE — ASSESSMENT & PLAN NOTE
Patient's blood pressure range in the last 24 hours was: BP  Min: 107/51  Max: 120/58.The patient's inpatient anti-hypertensive regimen is listed below:  Current Antihypertensives  furosemide tablet 40 mg, 2 times daily, Oral    Plan  - Patient presented with hypotension today s/p 500cc IV fluids in ED - normotensive tonight   -will continue furosemide.  Holding - coreg, entresto, hydrlazine and isosorbide -  BP has been low-normal here without these above meds so will cont to hold

## 2025-07-21 NOTE — ASSESSMENT & PLAN NOTE
Patient has paroxysmal (<7 days) atrial fibrillation. Patient is currently in sinus rhythm. [id number] Score: 3. The patients heart rate in the last 24 hours is as follows:  Pulse  Min: 72  Max: 80     Antiarrhythmics       Anticoagulants  apixaban tablet 5 mg, 2 times daily, Oral  heparin (porcine) injection 1,000 Units, As needed (PRN), Intra-Catheter    Plan  - Replete lytes with a goal of K>4, Mg >2  - Patient is anticoagulated, see medications listed above.  - Patient's afib is currently controlled  - continue carvedilol.

## 2025-07-21 NOTE — PLAN OF CARE
Problem: Infection  Goal: Absence of Infection Signs and Symptoms  Outcome: Progressing     Problem: Adult Inpatient Plan of Care  Goal: Plan of Care Review  Outcome: Progressing  Goal: Patient-Specific Goal (Individualized)  Outcome: Progressing  Goal: Absence of Hospital-Acquired Illness or Injury  Outcome: Progressing  Goal: Optimal Comfort and Wellbeing  Outcome: Progressing  Goal: Readiness for Transition of Care  Outcome: Progressing     Problem: Diabetes Comorbidity  Goal: Blood Glucose Level Within Targeted Range  Outcome: Progressing     Problem: Sepsis/Septic Shock  Goal: Optimal Coping  Outcome: Progressing  Goal: Absence of Bleeding  Outcome: Progressing  Goal: Blood Glucose Level Within Targeted Range  Outcome: Progressing  Goal: Absence of Infection Signs and Symptoms  Outcome: Progressing  Goal: Optimal Nutrition Intake  Outcome: Progressing     Problem: Wound  Goal: Optimal Coping  Outcome: Progressing  Goal: Optimal Functional Ability  Outcome: Progressing  Goal: Absence of Infection Signs and Symptoms  Outcome: Progressing  Goal: Improved Oral Intake  Outcome: Progressing  Goal: Optimal Pain Control and Function  Outcome: Progressing  Goal: Skin Health and Integrity  Outcome: Progressing  Goal: Optimal Wound Healing  Outcome: Progressing     Problem: Fall Injury Risk  Goal: Absence of Fall and Fall-Related Injury  Outcome: Progressing     Problem: Skin Injury Risk Increased  Goal: Skin Health and Integrity  Outcome: Progressing     Problem: Pain Acute  Goal: Optimal Pain Control and Function  Outcome: Progressing     Problem: Electrolyte Imbalance  Goal: Electrolyte Balance  Outcome: Progressing     Problem: Hemodialysis  Goal: Safe, Effective Therapy Delivery  Outcome: Progressing  Goal: Effective Tissue Perfusion  Outcome: Progressing  Goal: Absence of Infection Signs and Symptoms  Outcome: Progressing

## 2025-07-21 NOTE — NURSING
Patient is in contact Isolation . 3.5 hours dialysis treatment completed . 1 L UF removed . Both lumens of HD catheter were flushed , Heparin locked and wrapped with tape and gauze .  Report  given to Nurse

## 2025-07-21 NOTE — DISCHARGE SUMMARY
[facility] [address] [place] [address] Medicine  Discharge Summary      Patient Name: Suyapa Connelly  MRN: [id number]  CHAVEZ: [id number]  Patient Class: IP- Inpatient  Admission Date: 7/18/2025  Hospital Length of Stay: 2 days  Discharge Date and Time: 07/21/2025 11:12 AM  Attending Physician: Frances Bee MD   Discharging Provider: Frances Bee MD  Primary Care Provider: Vanessa Noel MD  [facility] Medicine Team: [facility] HOSP MED  Frances Bee MD  Primary Care Team: [facility]    HPI:   59-year-old woman with ESRD on HD, undergoing PD training, CAD status post CABG, P AFib, status post bilateral AKA presents to the emergency department for concerns of confusion.      She was recently admitted to [facility] from July 3rd to July 12th, diagnosed with ESBL Klebsiella UTI, seen by infectious diseases and was treated with 5 days of ertapenem inpatient, discharge summary indicates her mental status was at baseline at discharge.  Tonight her  reports that she has not had resolution of mental status change or confusion.   provides majority of history.    He reports her last PT session was July 11th, she had ambulatory hemodialysis on July 14th and July 16th, did not attend dialysis today as  brought her to the emergency department, he also noted patient had cloudy urine.  On review of medication reconciliation Lyrica was advised to be on pause at recent discharge but  states he is continue to give her once daily 75 mg dose.  He denies that patient has had bouts of hypoglycemia and/or hypotension.  She has not been on any outpatient antibiotics.    ED Treatment: 500c normal saline.     * No surgery found *      Hospital Course:   Encephalopathy. UA reflexed to culture, started on empiric ertapenem although ID recommends looking for alternate source of mentation change. Lyrica has been discontinued and discussed with patient's significant other. Nephrology consulted for PD/HD  status. PD cell count, gram stain, and cx obtained which did not reflect e/o bacterial peritonitis. UCx only grew a few candida which is colonization. Ertapenem discontinued. Mentation has improved. Consider hypotension as etiology as all of her home meds were held here with low-normal BP's - recommended holding these at this time with close OP BP monitoring. Will hold home trazodone as well. Referral for Neurology placed. She will f/u with PCP and Nephro as well.      Goals of Care Treatment Preferences:  Code Status: Full Code         Consults:   Consults (From admission, onward)          Status Ordering Provider     Inpatient consult to Nephrology  Once        Provider:  (Not yet assigned)    Completed MARCIA MURILLO            Assessment & Plan  Encephalopathy  -patient again has high pyuria - urine gram stain and culture are pending - she was recently treated with ertapenem.    -hold lyrica and trazodone at this time   -per last PCP note in March 2025 patient has had ongoing cognitive & memory issues, vascular dementia on problem list, refer for neurocognitive testing on discharge.   -referral for neurology  ESRD (end stage renal disease)  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, on chronic dialysis  -consult nephrology  -no acute BUN elevation  -continue cinacalcet, sevelamer for CKD-MBD  -continue sodium bicarbonte.   -PD fluid cell count, gram stain, and cx does not reflect infectious process  Coronary artery disease involving coronary bypass graft with angina pectoris  Patient with known CAD s/p CABG, which is controlled Will continue Plavix and Statin and monitor for S/Sx of angina/ACS. Continue to monitor on telemetry.   Paroxysmal atrial fibrillation  Patient has paroxysmal (<7 days) atrial fibrillation. Patient is currently in sinus rhythm. [id number] Score: 3. The patients heart rate in the last 24 hours is as follows:  Pulse  Min: 72  Max: 80      Antiarrhythmics       Anticoagulants  apixaban tablet 5 mg, 2 times daily, Oral  heparin (porcine) injection 1,000 Units, As needed (PRN), Intra-Catheter    Plan  - Replete lytes with a goal of K>4, Mg >2  - Patient is anticoagulated, see medications listed above.  - Patient's afib is currently controlled  - continue carvedilol.       Primary hypertension  Chronic combined systolic and diastolic congestive heart failure  Patient's blood pressure range in the last 24 hours was: BP  Min: 107/51  Max: 120/58.The patient's inpatient anti-hypertensive regimen is listed below:  Current Antihypertensives  furosemide tablet 40 mg, 2 times daily, Oral    Plan  - Patient presented with hypotension today s/p 500cc IV fluids in ED - normotensive tonight   -will continue furosemide.  Holding - coreg, entresto, hydrlazine and isosorbide -  BP has been low-normal here without these above meds so will cont to hold  Anemia due to chronic kidney disease, on chronic dialysis  Chronic and stable  Type 2 diabetes mellitus with hyperglycemia, with long-term current use of insulin  Patient's FSGs are controlled on current medication regimen.  Last A1c reviewed-   Lab Results   Component Value Date    HGBA1C 6.2 (H) 07/03/2025     Most recent fingerstick glucose reviewed-   Recent Labs   Lab 07/20/25  1645 07/20/25  2102 07/21/25  0724   POCTGLUCOSE 322* 286* 286*     Current correctional scale  Low  Maintain anti-hyperglycemic dose as follows-   Antihyperglycemics (From admission, onward)      Start     Stop Route Frequency Ordered    07/21/25 0815  insulin glargine U-100 (Lantus) pen 5 Units         -- SubQ Daily 07/21/25 0808    07/20/25 1215  insulin aspart U-100 pen 3 Units         -- SubQ 3 times daily with meals 07/20/25 1200    07/18/25 2227  insulin aspart U-100 pen 0-5 Units         -- SubQ Before meals & nightly PRN 07/18/25 2127          Conservative dosing of insulin during admission   Debility  Patient with Chronic  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 The patient's latest AMPAC (Activity Measure for Post Acute Care) Score is listed below.    AM-PAC Score - How much help does the patient need for each activity listed  Basic Mobility Total Score: 18  Turning over in bed (including adjusting bedclothes, sheets and blankets)?: A little  Sitting down on and standing up from a chair with arms (e.g., wheelchair, bedside commode, etc.): A little  Moving from lying on back to sitting on the side of the bed?: A little  Moving to and from a bed to a chair (including a wheelchair)?: A little  Need to walk in hospital room?: A little  Climbing 3-5 steps with a railing?: A little    Plan  - Progressive mobility protocol initated  - Fall precaution  CAROLIN (generalized anxiety disorder)  Continue home sertraline  S/P AKA (above knee amputation) bilateral  No edema/pain/ulceration noted.     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, on chronic dialysis [N18.5, E83.9, Z99.2, M89.9] 05/24/2023 Not Applicable    ESRD (end stage renal disease) [N18.6] 04/21/2023 Yes    S/P AKA (above knee amputation) bilateral [Z89.611, Z89.612] 03/26/2022 Not Applicable    Urinary tract infection due to ESBL Klebsiella [N39.0, B96.89] 03/15/2022 Yes    Debility [R53.81] 02/12/2022 Yes    Chronic combined systolic and diastolic congestive heart failure [I50.42] 06/21/2021 Yes    Paroxysmal atrial fibrillation [I48.0] 01/28/2020 Yes    Anemia due to chronic kidney disease, on chronic dialysis [N18.6, D63.1, Z99.2] 01/27/2020 Not Applicable    Coronary artery disease involving coronary bypass graft with angina pectoris [I25.709] 01/02/2020 Yes    Type 2 diabetes mellitus with hyperglycemia, with long-term current use of insulin [E11.65, Z79.4] 01/02/2020 Not  Applicable    CAROLIN (generalized anxiety disorder) [F41.1] 05/07/2018 Yes    Primary hypertension [I10] 05/05/2018 Yes      Problems Resolved During this Admission:       Discharged Condition: stable    Disposition: Home or Self Care    Follow Up:   Follow-up Information       Vanessa Noel MD. Schedule an appointment as soon as possible for a visit.    Specialty: Internal Medicine  Contact information:  [address] [place] [address]  [phone]               [facility] NEUROLOGY. Schedule an appointment as soon as possible for a visit.    Specialty: Neurology  Contact information:  [address]  [facility] [address]  [phone]                         Patient Instructions:      Ambulatory referral/consult to Neurology   Standing Status: Future   Referral Priority: Routine Referral Type: Consultation   Referral Reason: Specialty Services Required   Requested Specialty: Neurology   Number of Visits Requested: 1     Diet diabetic     Diet renal     Notify your health care provider if you experience any of the following:  increased confusion or weakness     Notify your health care provider if you experience any of the following:  persistent dizziness, light-headedness, or visual disturbances     Notify your health care provider if you experience any of the following:  difficulty breathing or increased cough     Activity as tolerated       Significant Diagnostic Studies: N/A    Pending Diagnostic Studies:       None           Medications:  Reconciled Home Medications:      Medication List        PAUSE taking these medications      calcium acetate(phosphat bind) 667 mg tablet  Wait to take this until your doctor or other care provider tells you to start again.  Commonly known as: PHOSLO  Take 1 tablet (667 mg total) by mouth 2 (two) times a day.     carvediloL 6.25 MG tablet  Wait to take this until your doctor or other care provider tells you to start again.  Commonly known as: COREG  Take 1  tablet (6.25 mg total) by mouth 2 (two) times daily.     hydrALAZINE 10 MG tablet  Wait to take this until your doctor or other care provider tells you to start again.  Commonly known as: APRESOLINE  Take 1 tablet (10 mg total) by mouth every 12 (twelve) hours.     isosorbide dinitrate 10 MG tablet  Wait to take this until your doctor or other care provider tells you to start again.  Commonly known as: ISORDIL  Take 0.5 tablets (5 mg total) by mouth 2 (two) times daily.     sacubitriL-valsartan 24-26 mg per tablet  Wait to take this until your doctor or other care provider tells you to start again.  Commonly known as: ENTRESTO  Take 1 tablet by mouth 2 (two) times daily.            CHANGE how you take these medications      sodium bicarbonate 650 MG tablet  TAKE 1 TABLET(650 MG) BY MOUTH THREE TIMES DAILY  What changed: See the new instructions.            CONTINUE taking these medications      acetaminophen 325 MG tablet  Commonly known as: TYLENOL  Take 2 tablets (650 mg total) by mouth every 8 (eight) hours as needed for Pain.     apixaban 5 mg Tab  Commonly known as: ELIQUIS  Take 1 tablet (5 mg total) by mouth 2 (two) times daily.     atorvastatin 80 MG tablet  Commonly known as: LIPITOR  Take 1 tablet (80 mg total) by mouth once daily.     blood sugar diagnostic Strp  1 each by Misc.(Non-Drug; Combo Route) route 4 (four) times daily before meals and nightly.     blood-glucose meter Misc  1 each by Misc.(Non-Drug; Combo Route) route 3 (three) times daily.     cinacalcet 30 MG Tab  Commonly known as: SENSIPAR  Take 1 tablet (30 mg total) by mouth every Mon, Wed, Fri.     clopidogreL 75 mg tablet  Commonly known as: PLAVIX  Take 1 tablet (75 mg total) by mouth once daily.     DEXCOM G7 SENSOR Radha  Generic drug: blood-glucose sensor  Apply device and replace every 10 days     famotidine 20 MG tablet  Commonly known as: PEPCID  Take 1 tablet (20 mg total) by mouth once daily.     furosemide 40 MG tablet  Commonly  "known as: LASIX  Take 1 tablet (40 mg total) by mouth 2 (two) times daily.     gentamicin 0.1 % cream  Commonly known as: GARAMYCIN  Apply topically once daily.     * insulin aspart U-100 100 unit/mL (3 mL) Inpn pen  Commonly known as: NovoLOG Flexpen U-100 Insulin  Inject 3 Units into the skin 3 (three) times daily with meals.     * insulin aspart U-100 100 unit/mL (3 mL) Inpn pen  Commonly known as: NovoLOG  Inject 5 Units into the skin 3 times daily with meals. **MODERATE CORRECTION DOSE**  151-200 mg/dL Pre-meal: 2 units 2200: 1 unit; 201-250 mg/dL Pre-meal 4 units 2200: 2 units; 251-300mg/dL Pre-meal 6 units 2200: 3 units; 301-350mg/dL Pre-meal 8 units 2200: 4 units; >350mg/dL Pre-meal 10 U 2200: 5 U     insulin glargine U-100 (Lantus) 100 unit/mL (3 mL) Inpn pen  Inject 5 Units into the skin every evening.     lancets 33 gauge Misc  TEST 3 TIMES DAILY     mupirocin 2 % ointment  Commonly known as: BACTROBAN  Apply topically 2 (two) times daily.     ONE DAILY WOMENS 50 PLUS 0.4 mg Tab  Generic drug: multivit with min-folic acid  Take 1 tablet by mouth once daily.     OZEMPIC 0.25 mg or 0.5 mg (2 mg/3 mL) pen injector  Generic drug: semaglutide  Inject 0.5 mg into the skin every 7 days.     * pen needle, diabetic 32 gauge x 5/32" Ndle  Commonly known as: BD MIGUEL 2ND GEN PEN NEEDLE  Use to inject insulin once daily     * pen needle, diabetic 33 gauge x 5/32" Ndle  1 each by Misc.(Non-Drug; Combo Route) route 4 (four) times daily before meals and nightly.     polyethylene glycol 17 gram/dose powder  Commonly known as: GLYCOLAX  Mix 1 capful (17 g) with a liquid and take by mouth once daily.     RENVELA 2.4 gram Pwpk  Generic drug: sevelamer carbonate  Take 1 packet by mouth 3 (three) times daily with meals.     sertraline 100 MG tablet  Commonly known as: ZOLOFT  Take 1 tablet (100 mg total) by mouth once daily.     zinc oxide 10 % Oint  Apply 1 Application topically 2 (two) times a day.           * This list has " 4 medication(s) that are the same as other medications prescribed for you. Read the directions carefully, and ask your doctor or other care provider to review them with you.                STOP taking these medications      pregabalin 75 MG capsule  Commonly known as: LYRICA     traZODone 50 MG tablet  Commonly known as: DESYREL              Indwelling Lines/Drains at time of discharge:   Lines/Drains/Airways       Central Venous Catheter Line  Duration                  Hemodialysis Catheter left subclavian -- days         Hemodialysis Catheter left subclavian -- days              Drain  Duration                  Open Drain 06/03/25 1144 Tube - 1 Medial;Superior Abdomen Other (see comments) Other (see comments) 47 days                        Time spent on the discharge of patient: 45 minutes of time spent on discharge, including examining the patient, providing discharge instructions, arranging follow up, and documentation.            Frances Bee MD  Department of Hospital Medicine  [facility] 11H

## 2025-07-21 NOTE — SUBJECTIVE & OBJECTIVE
Interval History: Patient evaluated at bedside this morning. She is resting comfortably. No acute concerns at this time.     Review of patient's allergies indicates:   Allergen Reactions    Ciprofloxacin Itching    Pcn [penicillins]      Rash; tolerated ceftriaxone on 1/13/20  Tolerating Augmentin November 2024     Current Facility-Administered Medications   Medication Frequency    acetaminophen tablet 650 mg Q8H PRN    allopurinol split tablet 50 mg Every other day    aluminum-magnesium hydroxide-simethicone 200-200-20 mg/5 mL suspension 30 mL QID PRN    apixaban tablet 5 mg BID    atorvastatin tablet 80 mg Daily    cinacalcet tablet 30 mg Every Mon, Wed, Fri    clopidogreL tablet 75 mg Daily    dextrose 50% injection 12.5 g PRN    dextrose 50% injection 25 g PRN    erythromycin 5 mg/gram (0.5 %) ophthalmic ointment Q6H    famotidine tablet 20 mg Q48H    fluconazole tablet 100 mg Daily    furosemide tablet 40 mg BID    glucagon (human recombinant) injection 1 mg PRN    glucose chewable tablet 16 g PRN    glucose chewable tablet 24 g PRN    insulin aspart U-100 pen 0-5 Units QID (AC + HS) PRN    insulin aspart U-100 pen 3 Units TIDWM    Lactobacillus rhamnosus GG capsule 1 capsule Daily    naloxone 0.4 mg/mL injection 0.02 mg PRN    ondansetron injection 4 mg Q8H PRN    polyethylene glycol packet 17 g Daily    prochlorperazine injection Soln 5 mg Q6H PRN    senna-docusate 8.6-50 mg per tablet 1 tablet BID PRN    sertraline tablet 100 mg Daily    sevelamer carbonate 2.4 gram powder 2.4 g TID WM    sodium bicarbonate tablet 650 mg BID    sodium chloride 0.9% flush 10 mL Q6H PRN    trazodone split tablet 25 mg QHS       Objective:     Vital Signs (Most Recent):  Temp: 97.4 °F (36.3 °C) (07/21/25 0604)  Pulse: 76 (07/21/25 0604)  Resp: 16 (07/21/25 0604)  BP: (!) 115/54 (07/21/25 0604)  SpO2: 95 % (07/21/25 0604) Vital Signs (24h Range):  Temp:  [97.4 °F (36.3 °C)-98.7 °F (37.1 °C)] 97.4 °F (36.3 °C)  Pulse:  [76-81]  76  Resp:  [16-18] 16  SpO2:  [95 %-100 %] 95 %  BP: (109-128)/(49-56) 115/54     Weight: 60.5 kg (133 lb 6.1 oz) (07/19/25 0400)  Body mass index is 26.05 kg/m².  Body surface area is 1.6 meters squared.    I/O last 3 completed shifts:  In: 1080 [P.O.:1080]  Out: 401 [Urine:400; Stool:1]     Physical Exam  Vitals and nursing note reviewed.   Constitutional:       Appearance: Normal appearance. She is not ill-appearing.   Eyes:      General: No scleral icterus.     Extraocular Movements: Extraocular movements intact.   Cardiovascular:      Rate and Rhythm: Normal rate and regular rhythm.      Pulses: Normal pulses.   Pulmonary:      Effort: Pulmonary effort is normal. No respiratory distress.   Musculoskeletal:         General: Deformity: bilateral AKA. Normal range of motion.   Skin:     General: Skin is warm and dry.   Neurological:      General: No focal deficit present.      Mental Status: She is alert. Mental status is at baseline.   Psychiatric:         Mood and Affect: Mood normal.          Significant Labs:  All labs within the past 24 hours have been reviewed.     Significant Imaging:  Labs: Reviewed

## 2025-07-21 NOTE — ASSESSMENT & PLAN NOTE
ESRD on iHD MWF; transitioning to [employer] and currently undergoing training as an OP  [employer]    Recently admitted to [facility] and dx with Klebsiella ESBL UTI, completed abx course of Ertapenem x 5 days on 7/12.  Also was on IP Abx for suspected peritonitis with Vanc/Ceftaz (Abx completed upon discharge)  7/3:  Cell counts 222; SEGs 32%  7/7:  Cell counts 16; SEGs 1%  PD Cx NGTD  7/19: Cell count 50; SEGs 14%   PD Cx pending       Plan/Recommendations:  -iHD today and continue 3x week dailysis while IP  -F/u with fluid analysis for PD fluid  -Strict I&O, pre and post-renal weights  -place on renal diet, will add protein supplements with her meals

## 2025-07-22 ENCOUNTER — PATIENT OUTREACH (OUTPATIENT)
Dept: ADMINISTRATIVE | Facility: CLINIC | Age: 60
End: 2025-07-22
Payer: MEDICARE

## 2025-07-22 LAB — BACTERIA SPEC AEROBE CULT: NO GROWTH

## 2025-07-22 NOTE — PROGRESS NOTES
C3 nurse attempted to contact Suyapa KEYSHA Connelly, and patient's spouse, Randell, for a TCC post hospital discharge follow up call. No answer.  No option to leave a voicemail with callback information. The patient has a scheduled HOSFU appointment withBonita Malin NP on 7/25/2025 at 11 AM.

## 2025-07-23 LAB
BACTERIA BLD CULT: NORMAL
BACTERIA BLD CULT: NORMAL

## 2025-07-23 NOTE — PROGRESS NOTES
C3 nurse spoke with Suyapa Connelly's spouse, Randell for a TCC post hospital discharge follow up call. The patient has a scheduled HOSFU appointment with Bonita Malin NP on 07/25/25 @ 1100.

## 2025-07-24 RX ORDER — SEVELAMER CARBONATE 2400 MG/1
1 POWDER, FOR SUSPENSION ORAL
OUTPATIENT
Start: 2025-07-24

## 2025-07-24 RX ORDER — SEVELAMER CARBONATE 2400 MG/1
1 POWDER, FOR SUSPENSION ORAL
Status: CANCELLED | OUTPATIENT
Start: 2025-07-24

## 2025-07-25 ENCOUNTER — OFFICE VISIT (OUTPATIENT)
Dept: FAMILY MEDICINE | Facility: CLINIC | Age: 60
End: 2025-07-25
Payer: MEDICARE

## 2025-07-25 VITALS
DIASTOLIC BLOOD PRESSURE: 62 MMHG | HEIGHT: 60 IN | OXYGEN SATURATION: 98 % | WEIGHT: 133.38 LBS | TEMPERATURE: 98 F | BODY MASS INDEX: 26.19 KG/M2 | HEART RATE: 79 BPM | SYSTOLIC BLOOD PRESSURE: 122 MMHG

## 2025-07-25 DIAGNOSIS — Z12.31 ENCOUNTER FOR SCREENING MAMMOGRAM FOR MALIGNANT NEOPLASM OF BREAST: ICD-10-CM

## 2025-07-25 DIAGNOSIS — E83.9 CHRONIC KIDNEY DISEASE-MINERAL BONE DISORDER (CKD-MBD) WITH STAGE 5 CHRONIC KIDNEY DISEASE, ON CHRONIC DIALYSIS: ICD-10-CM

## 2025-07-25 DIAGNOSIS — Z74.09 IMPAIRED FUNCTIONAL MOBILITY AND ENDURANCE: ICD-10-CM

## 2025-07-25 DIAGNOSIS — Z12.4 SCREENING FOR CERVICAL CANCER: ICD-10-CM

## 2025-07-25 DIAGNOSIS — E78.2 MIXED HYPERLIPIDEMIA: ICD-10-CM

## 2025-07-25 DIAGNOSIS — Z23 NEED FOR VACCINATION: ICD-10-CM

## 2025-07-25 DIAGNOSIS — M89.9 CHRONIC KIDNEY DISEASE-MINERAL BONE DISORDER (CKD-MBD) WITH STAGE 5 CHRONIC KIDNEY DISEASE, ON CHRONIC DIALYSIS: ICD-10-CM

## 2025-07-25 DIAGNOSIS — I10 PRIMARY HYPERTENSION: ICD-10-CM

## 2025-07-25 DIAGNOSIS — Z79.4 TYPE 2 DIABETES MELLITUS WITH CHRONIC KIDNEY DISEASE ON CHRONIC DIALYSIS, WITH LONG-TERM CURRENT USE OF INSULIN: ICD-10-CM

## 2025-07-25 DIAGNOSIS — Z89.612 S/P AKA (ABOVE KNEE AMPUTATION) BILATERAL: ICD-10-CM

## 2025-07-25 DIAGNOSIS — G93.40 ENCEPHALOPATHY, UNSPECIFIED TYPE: Primary | ICD-10-CM

## 2025-07-25 DIAGNOSIS — Z99.2 TYPE 2 DIABETES MELLITUS WITH CHRONIC KIDNEY DISEASE ON CHRONIC DIALYSIS, WITH LONG-TERM CURRENT USE OF INSULIN: ICD-10-CM

## 2025-07-25 DIAGNOSIS — Z99.2 CHRONIC KIDNEY DISEASE-MINERAL BONE DISORDER (CKD-MBD) WITH STAGE 5 CHRONIC KIDNEY DISEASE, ON CHRONIC DIALYSIS: ICD-10-CM

## 2025-07-25 DIAGNOSIS — E11.22 TYPE 2 DIABETES MELLITUS WITH CHRONIC KIDNEY DISEASE ON CHRONIC DIALYSIS, WITH LONG-TERM CURRENT USE OF INSULIN: ICD-10-CM

## 2025-07-25 DIAGNOSIS — I50.42 CHRONIC COMBINED SYSTOLIC AND DIASTOLIC CONGESTIVE HEART FAILURE: ICD-10-CM

## 2025-07-25 DIAGNOSIS — N18.6 ESRD (END STAGE RENAL DISEASE): ICD-10-CM

## 2025-07-25 DIAGNOSIS — I25.119 ATHEROSCLEROSIS OF NATIVE CORONARY ARTERY OF NATIVE HEART WITH ANGINA PECTORIS: ICD-10-CM

## 2025-07-25 DIAGNOSIS — R53.81 DEBILITY: ICD-10-CM

## 2025-07-25 DIAGNOSIS — Z89.611 S/P AKA (ABOVE KNEE AMPUTATION) BILATERAL: ICD-10-CM

## 2025-07-25 DIAGNOSIS — K59.00 CONSTIPATION, UNSPECIFIED CONSTIPATION TYPE: ICD-10-CM

## 2025-07-25 DIAGNOSIS — N18.5 CHRONIC KIDNEY DISEASE-MINERAL BONE DISORDER (CKD-MBD) WITH STAGE 5 CHRONIC KIDNEY DISEASE, ON CHRONIC DIALYSIS: ICD-10-CM

## 2025-07-25 DIAGNOSIS — I48.0 PAROXYSMAL ATRIAL FIBRILLATION: ICD-10-CM

## 2025-07-25 DIAGNOSIS — N18.6 TYPE 2 DIABETES MELLITUS WITH CHRONIC KIDNEY DISEASE ON CHRONIC DIALYSIS, WITH LONG-TERM CURRENT USE OF INSULIN: ICD-10-CM

## 2025-07-25 DIAGNOSIS — Z12.11 SCREENING FOR COLON CANCER: ICD-10-CM

## 2025-07-25 PROCEDURE — 99999 PR PBB SHADOW E&M-EST. PATIENT-LVL V: CPT | Mod: PBBFAC,HCNC,,

## 2025-07-25 RX ORDER — POLYETHYLENE GLYCOL 3350 17 G/17G
17 POWDER, FOR SOLUTION ORAL DAILY
Qty: 1530 G | Refills: 0 | Status: SHIPPED | OUTPATIENT
Start: 2025-07-25

## 2025-07-25 RX ORDER — FUROSEMIDE 40 MG/1
80 TABLET ORAL 2 TIMES DAILY
Qty: 180 TABLET | Refills: 3 | Status: SHIPPED | OUTPATIENT
Start: 2025-07-25

## 2025-07-25 RX ORDER — SEVELAMER CARBONATE 2400 MG/1
1 POWDER, FOR SUSPENSION ORAL
Qty: 270 PACKET | Refills: 0 | Status: SHIPPED | OUTPATIENT
Start: 2025-07-25

## 2025-07-25 NOTE — PROGRESS NOTES
"Subjective:           Suyapa Connelly is a 59 y.o. female, patient of Vanessa Noel MD with a PMH listed below.       History of Present Illness    CHIEF COMPLAINT:  Suyapa presents today for follow-up after recent hospital admission for encephalopathy.  accompanying at visit today.     RECENT HOSPITALIZATION AND NEUROLOGICAL STATUS:  She was recently hospitalized from 7/18/2025 to 7/21/2025 for encephalopathy characterized by increasing confusion and lethargy, following a UTI one week prior to admission. PD cell count, gram stain, and cx obtained which did not reflect e/o bacterial peritonitis. UCx only grew a few candida which is colonization. Empiric abx treatment with Ertapenem was discontinued after negative cultures. Lyrica, trazodone, and blood pressure medications held while inpatient and upon discharge. She currently reports feeling "slow" mentally with ongoing mild cognitive changes and memory issues for months. She is not yet back to baseline cognitively function but denies any specific complaints. She has an upcoming Neurology appt in 12/2025 for her further cognitive evaluation.  and patient both voice improve in mentation since hospital discharge.     DIALYSIS:   She has been receiving hemodialysis since May 2022. Receives HD treatment at [facility] in [facility], last treatment Wednesday, scheduled for treatment today after HFU visit. She is preparing to transition to peritoneal dialysis next week. She has completed one week of peritoneal dialysis training and reports no current concerns with dialysis treatment.     DIABETES MANAGEMENT:  Her blood sugars range from 90s to 150s, with most readings between 98 and 139. She occasionally experiences higher readings of 159, typically after eating dessert. Recent A1C was 6.2, indicating relatively well-controlled diabetes. She does not receive insulin if blood sugar is below 100 mg/dL.     CURRENT MEDICATIONS:  She takes " Furosemide 80 mg twice daily, Plavix for blood thinning, Pepcid for reflux, Novolog 5 units 3 times daily, Lantus 8 units overnight, and Ozempic 0.5 mg.    DIET:  She eats 2-3 times per day with variable appetite, sometimes requiring encouragement to consume adequate nutrition.         Transitional Care Note    Family and/or Caretaker present at visit?  Yes. .   Diagnostic tests reviewed/disposition: I have reviewed all completed as well as pending diagnostic tests at the time of discharge.  Disease/illness education: ESRD, HF, Blood pressure, Signs of infection.   Home health/community services discussion/referrals: Patient does not have home health established from hospital visit.  They do not need home health.  If needed, we will set up home health for the patient. Home health services declined by  at visit today.   Establishment or re-establishment of referral orders for community resources: No other necessary community resources.   Discussion with other health care providers: Cardiology regarding restarting Entresto medication. .           Past Medical History:   Diagnosis Date    Acute myocardial infarction, unspecified 01/10/2024    Anaphylactic shock, unspecified, initial encounter 01/10/2024    Anxiety     Chronic pain syndrome     CKD (chronic kidney disease), stage III     CVD (cardiovascular disease)     Depression     Diabetes mellitus, type 2     GERD (gastroesophageal reflux disease)     Hyperemesis 03/23/2021    Hypokalemia 03/23/2021    Infection of below knee amputation stump 03/12/2022    Osteomyelitis     Osteomyelitis of left foot 04/30/2021    Pressure injury of sacral region, unstageable 05/14/2024    Ulcer of left foot     Vaginal delivery     x1    Vascular graft infection 06/01/2021       Review of Systems   Constitutional:  Negative for chills and fever.   HENT:  Negative for congestion and ear pain.    Eyes:  Negative for visual disturbance.   Respiratory:  Negative for  cough, shortness of breath and wheezing.    Cardiovascular:  Negative for chest pain and palpitations.   Gastrointestinal:  Negative for abdominal pain, constipation, diarrhea, nausea and vomiting.   Genitourinary:  Negative for dysuria, frequency, hematuria and urgency.   Musculoskeletal:  Negative for back pain.   Skin:  Negative for rash.   Neurological:  Negative for dizziness, weakness, light-headedness, numbness and headaches.   Psychiatric/Behavioral:  Negative for agitation and confusion.           Objective:     Vitals:    07/25/25 1110   BP: 122/62   BP Location: Right arm   Patient Position: Sitting   Pulse: 79   Temp: 98.2 °F (36.8 °C)   TempSrc: Oral   SpO2: 98%   Weight: 60.5 kg (133 lb 6.1 oz)   Height: 5' (1.524 m)          Physical Exam  Vitals reviewed.   Constitutional:       General: She is awake. She is not in acute distress.     Appearance: She is not ill-appearing.   HENT:      Nose: No congestion or rhinorrhea.      Mouth/Throat:      Mouth: Mucous membranes are moist.   Eyes:      General:         Right eye: No discharge.         Left eye: No discharge.      Pupils: Pupils are equal, round, and reactive to light.      Comments: Redness noted to sclera of right eye- reports currently being treatment for eye infection. No visible drainage.    Cardiovascular:      Rate and Rhythm: Normal rate and regular rhythm.   Pulmonary:      Effort: Pulmonary effort is normal. No respiratory distress.      Breath sounds: Normal breath sounds.   Chest:       Abdominal:      General: Bowel sounds are normal. There is no distension.      Palpations: Abdomen is soft.      Tenderness: There is no abdominal tenderness. There is no guarding or rebound.       Musculoskeletal:         General: Normal range of motion.      Comments: Bilateral AKA's.    Skin:     General: Skin is warm and dry.   Neurological:      General: No focal deficit present.      Mental Status: She is alert and oriented to person, place, and  time.      Gait: Gait abnormal (uses motorized scooter.).   Psychiatric:         Behavior: Behavior normal. Behavior is cooperative.             Assessment:         ICD-10-CM ICD-9-CM   1. Encephalopathy, unspecified type  G93.40 348.30   2. Type 2 diabetes mellitus with chronic kidney disease on chronic dialysis, with long-term current use of insulin  E11.22 250.40    N18.6 585.9    Z99.2 V45.11    Z79.4 V58.67   3. ESRD (end stage renal disease)  N18.6 585.6   4. 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, on chronic dialysis  N18.5 585.5    E83.9 275.9    Z99.2 733.90    M89.9 V45.11   5. Chronic combined systolic and diastolic congestive heart failure  I50.42 428.42     428.0   6. Paroxysmal atrial fibrillation  I48.0 427.31   7. Primary hypertension  I10 401.9   8. Mixed hyperlipidemia  E78.2 272.2   9. Atherosclerosis of native coronary artery of native heart with angina pectoris  I25.119 414.01     413.9   10. Constipation, unspecified constipation type  K59.00 564.00   11. S/P AKA (above knee amputation) bilateral  Z89.611 V49.76    Z89.612    12. Debility  R53.81 799.3   13. Impaired functional mobility and endurance  Z74.09 V49.89   14. Encounter for screening mammogram for malignant neoplasm of breast  Z12.31 V76.12   15. Screening for cervical cancer  Z12.4 V76.2   16. Screening for colon cancer  Z12.11 V76.51   17. Need for vaccination  Z23 V05.9       Plan:       Encephalopathy, unspecified type  -Symptoms improving s/p discharge.  -AAOX4 at visit today.   -No red flags identified at visit today.   -ED precautions discussed, patient and  verbalized understanding.   -Follow up 1 month with PCP as scheduled.     Type 2 diabetes mellitus with chronic kidney disease on chronic dialysis, with long-term current use of insulin  -     Ambulatory referral/consult to Nephrology; Future; Expected date: 08/01/2025  -Chronic.   -Controlled. A1c 6.2.   -Home BS   mostly. Uses Dexcom G7 CGM.   -On Novolog 5U TID with meals, Lantus 8U every evening, Ozempic 0.5 mg weekly.   -Followed by PCP.     ESRD (end stage renal disease)  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, on chronic dialysis  -     Ambulatory referral/consult to Nephrology; Future; Expected date: 08/01/2025  -     RENVELA 2.4 gram PwPk; Take 1 packet (2.4 g total) by mouth 3 (three) times daily with meals.  Dispense: 270 packet; Refill: 0  -Chronic.   -HD MWF. Due to start PD next week.   -Last treatment 2 days ago, next treatment today after HFU visit.   -Followed by [facility].   -Nephrology referral placed 2/2 patient/ unsure of current Nephrologist, rather establish with [facility] at this time.     Chronic combined systolic and diastolic congestive heart failure  -     furosemide (LASIX) 40 MG tablet; Take 2 tablets (80 mg total) by mouth 2 (two) times daily.  Dispense: 180 tablet; Refill: 3  -Chronic.   -EF 35-40% (11/2024).   -Asymptomatic at visit today.   -On Lasix 80 mg BID (epic noted 40 mg BID but  reports giving 80 mg BID for last few months per Dr. Noel, order confirmed with PCP).  -Restart entresto 24-26 1/2 pill BID 2/2 hypotension issues, can increase to full pill if tolerating at next visit with PCP/Cardiology (per Dr. Carrion recommendations).   -Recommended to check BP daily, if less than 100/60 then to hold medication.   -Followed by Cardiology.     Paroxysmal atrial fibrillation  -Chronic.   -Regular rate and rhythm at visit today.   -Asymptomatic at visit today.   -On Plavix 75 mg daily.   -Followed by Cardiology.     Primary hypertension  -Chronic.   -Controlled at visit today,122/62.   -Asymptomatic.   -All blood pressure medications held at this time.   -Encouraged to check BP daily. If consistently below 90/60 or above 140/90 follow up sooner.   -Follow up 1 month with PCP.      Mixed  hyperlipidemia  Atherosclerosis of native coronary artery of native heart with angina pectoris  -Chronic.   -Stable.   -On Plavix 75 mg daily, atorvastatin 80 mg daily.   -Followed by PCP/Cardiology.     Constipation, unspecified constipation type  -     polyethylene glycol (GLYCOLAX) 17 gram/dose powder; Mix 1 capful (17 g) with a liquid and take by mouth once daily.  Dispense: 1530 g; Refill: 0    S/P AKA (above knee amputation) bilateral  Debility  Impaired functional mobility and endurance  -Chronic.   -Uses motorized scooter.   -Home health/PT/OT services declined at visit today.     Encounter for screening mammogram for malignant neoplasm of breast  -     Mammo Digital Screening Bilat w/ Vignesh (XPD); Future; Expected date: 07/25/2025    Screening for cervical cancer  -     Ambulatory referral/consult to Gynecology; Future; Expected date: 08/01/2025    Screening for colon cancer  -     Cologuard Screening (Multitarget Stool DNA); Future; Expected date: 07/25/2025    Need for vaccination  -     COVID-19 (Pfizer) 30 mcg/0.3 mL IM vaccine (>/= 13 yo) 0.3 mL    RTC/ED precautions discussed where applicable.   Encouraged patient/caregiver to let me know if there are any further questions/concerns.   Patient/caretaker agrees with the treatment plan.     Follow up in about 1 month (around 8/25/2025).      LUDA GarciaC  Family Medicine  [facility]     This note was generated with the assistance of ambient listening technology. Verbal consent was obtained by the patient and accompanying visitor(s) for the recording of patient appointment to facilitate this note. I attest to having reviewed and edited the generated note for accuracy, though some syntax or spelling errors may persist. Please contact the author of this note for any clarification.

## 2025-07-29 ENCOUNTER — TELEPHONE (OUTPATIENT)
Dept: FAMILY MEDICINE | Facility: CLINIC | Age: 60
End: 2025-07-29
Payer: MEDICARE

## 2025-07-31 ENCOUNTER — TELEPHONE (OUTPATIENT)
Dept: FAMILY MEDICINE | Facility: CLINIC | Age: 60
End: 2025-07-31
Payer: MEDICARE

## 2025-07-31 NOTE — TELEPHONE ENCOUNTER
I have spoken to the patients pharmacy in regards to the medication Renela 2.4 GM packets. The pharmacy states that they are waiting for their 3rd party  to transfer the medication over to the pharmacy. Once Chuck receives the medication the patient will be able to  the medication.

## 2025-08-13 ENCOUNTER — PATIENT OUTREACH (OUTPATIENT)
Dept: ADMINISTRATIVE | Facility: HOSPITAL | Age: 60
End: 2025-08-13
Payer: MEDICARE

## 2025-08-19 ENCOUNTER — PATIENT OUTREACH (OUTPATIENT)
Dept: ADMINISTRATIVE | Facility: HOSPITAL | Age: 60
End: 2025-08-19
Payer: MEDICARE

## 2025-08-21 ENCOUNTER — TELEPHONE (OUTPATIENT)
Dept: FAMILY MEDICINE | Facility: CLINIC | Age: 60
End: 2025-08-21
Payer: MEDICARE

## 2025-08-21 ENCOUNTER — PATIENT OUTREACH (OUTPATIENT)
Dept: ADMINISTRATIVE | Facility: HOSPITAL | Age: 60
End: 2025-08-21
Payer: MEDICARE

## 2025-08-25 ENCOUNTER — PATIENT OUTREACH (OUTPATIENT)
Dept: ADMINISTRATIVE | Facility: HOSPITAL | Age: 60
End: 2025-08-25
Payer: MEDICARE

## 2025-08-25 DIAGNOSIS — F41.1 GAD (GENERALIZED ANXIETY DISORDER): ICD-10-CM

## 2025-08-25 DIAGNOSIS — Z95.1 S/P CABG X 1: ICD-10-CM

## 2025-08-25 DIAGNOSIS — I25.10 CORONARY ARTERY DISEASE, UNSPECIFIED VESSEL OR LESION TYPE, UNSPECIFIED WHETHER ANGINA PRESENT, UNSPECIFIED WHETHER NATIVE OR TRANSPLANTED HEART: ICD-10-CM

## 2025-08-25 DIAGNOSIS — K59.00 CONSTIPATION, UNSPECIFIED CONSTIPATION TYPE: ICD-10-CM

## 2025-08-25 DIAGNOSIS — I50.42 CHRONIC COMBINED SYSTOLIC AND DIASTOLIC CONGESTIVE HEART FAILURE: ICD-10-CM

## 2025-08-25 DIAGNOSIS — N18.6 ESRD (END STAGE RENAL DISEASE): ICD-10-CM

## 2025-08-25 RX ORDER — PEN NEEDLE, DIABETIC 30 GX3/16"
NEEDLE, DISPOSABLE MISCELLANEOUS DAILY
Qty: 100 EACH | Refills: 0 | Status: SHIPPED | OUTPATIENT
Start: 2025-08-25

## 2025-08-25 RX ORDER — MULTIVITAMIN
1 TABLET ORAL DAILY
Qty: 90 TABLET | Refills: 0 | Status: SHIPPED | OUTPATIENT
Start: 2025-08-25

## 2025-08-25 RX ORDER — ACETAMINOPHEN 325 MG/1
650 TABLET ORAL EVERY 8 HOURS PRN
Start: 2025-08-25

## 2025-08-25 RX ORDER — PEN NEEDLE, DIABETIC 33 GX5/32"
1 NEEDLE, DISPOSABLE MISCELLANEOUS
Start: 2025-08-25

## 2025-08-25 RX ORDER — SERTRALINE HYDROCHLORIDE 100 MG/1
100 TABLET, FILM COATED ORAL DAILY
Qty: 30 TABLET | Refills: 11 | Status: SHIPPED | OUTPATIENT
Start: 2025-08-25 | End: 2026-08-25

## 2025-08-25 RX ORDER — FUROSEMIDE 40 MG/1
80 TABLET ORAL 2 TIMES DAILY
Qty: 180 TABLET | Refills: 3 | Status: SHIPPED | OUTPATIENT
Start: 2025-08-25

## 2025-08-25 RX ORDER — DEXTROSE 4 G
1 TABLET,CHEWABLE ORAL 3 TIMES DAILY
Qty: 1 EACH | Refills: 0 | Status: SHIPPED | OUTPATIENT
Start: 2025-08-25 | End: 2026-08-25

## 2025-08-25 RX ORDER — BLOOD-GLUCOSE SENSOR
1 EACH MISCELLANEOUS DAILY
Qty: 5 EACH | Refills: 0 | Status: SHIPPED | OUTPATIENT
Start: 2025-08-25 | End: 2026-08-25

## 2025-08-25 RX ORDER — SACUBITRIL AND VALSARTAN 24; 26 MG/1; MG/1
1 TABLET ORAL 2 TIMES DAILY
Qty: 60 TABLET | Refills: 6 | Status: SHIPPED | OUTPATIENT
Start: 2025-08-25

## 2025-08-25 RX ORDER — POLYETHYLENE GLYCOL 3350 17 G/17G
17 POWDER, FOR SOLUTION ORAL DAILY
Qty: 1530 G | Refills: 0 | Status: SHIPPED | OUTPATIENT
Start: 2025-08-25

## 2025-08-25 RX ORDER — INSULIN ASPART 100 [IU]/ML
5 INJECTION, SOLUTION INTRAVENOUS; SUBCUTANEOUS
Qty: 13.5 ML | Refills: 0 | Status: SHIPPED | OUTPATIENT
Start: 2025-08-25

## 2025-08-25 RX ORDER — SEVELAMER CARBONATE 2400 MG/1
1 POWDER, FOR SUSPENSION ORAL
Qty: 270 PACKET | Refills: 0 | Status: SHIPPED | OUTPATIENT
Start: 2025-08-25

## 2025-08-25 RX ORDER — LANCETS 33 GAUGE
EACH MISCELLANEOUS
Qty: 100 EACH | Refills: 3 | Status: SHIPPED | OUTPATIENT
Start: 2025-08-25

## 2025-08-25 RX ORDER — INSULIN ASPART 100 [IU]/ML
3 INJECTION, SOLUTION INTRAVENOUS; SUBCUTANEOUS
Qty: 3 ML | Refills: 10 | Status: SHIPPED | OUTPATIENT
Start: 2025-08-25 | End: 2026-08-25

## 2025-08-25 RX ORDER — MUPIROCIN 20 MG/G
OINTMENT TOPICAL 2 TIMES DAILY
Qty: 30 G | Refills: 3 | Status: SHIPPED | OUTPATIENT
Start: 2025-08-25

## 2025-08-25 RX ORDER — FAMOTIDINE 20 MG/1
20 TABLET, FILM COATED ORAL DAILY
Start: 2025-08-25

## 2025-08-25 RX ORDER — SODIUM BICARBONATE 650 MG/1
650 TABLET ORAL 3 TIMES DAILY
Qty: 90 TABLET | Refills: 11 | Status: SHIPPED | OUTPATIENT
Start: 2025-08-25

## 2025-08-25 RX ORDER — SEMAGLUTIDE 0.68 MG/ML
0.5 INJECTION, SOLUTION SUBCUTANEOUS
Qty: 9 ML | Refills: 0 | Status: SHIPPED | OUTPATIENT
Start: 2025-08-25 | End: 2025-11-23

## 2025-08-25 RX ORDER — ATORVASTATIN CALCIUM 80 MG/1
80 TABLET, FILM COATED ORAL DAILY
Qty: 90 TABLET | Refills: 3 | Status: SHIPPED | OUTPATIENT
Start: 2025-08-25 | End: 2026-08-25

## 2025-08-25 RX ORDER — CLOPIDOGREL BISULFATE 75 MG/1
75 TABLET ORAL DAILY
Qty: 90 TABLET | Refills: 2 | Status: SHIPPED | OUTPATIENT
Start: 2025-08-25 | End: 2026-08-25

## 2025-08-25 RX ORDER — INSULIN GLARGINE 100 [IU]/ML
5 INJECTION, SOLUTION SUBCUTANEOUS NIGHTLY
Qty: 6 ML | Refills: 0 | Status: SHIPPED | OUTPATIENT
Start: 2025-08-25

## 2025-08-26 ENCOUNTER — PATIENT OUTREACH (OUTPATIENT)
Dept: ADMINISTRATIVE | Facility: HOSPITAL | Age: 60
End: 2025-08-26
Payer: MEDICARE

## 2025-08-28 ENCOUNTER — TELEPHONE (OUTPATIENT)
Dept: FAMILY MEDICINE | Facility: CLINIC | Age: 60
End: 2025-08-28
Payer: MEDICARE

## 2025-09-03 ENCOUNTER — TELEPHONE (OUTPATIENT)
Dept: FAMILY MEDICINE | Facility: CLINIC | Age: 60
End: 2025-09-03
Payer: MEDICARE

## (undated) DEVICE — SUT PROLENE 5-0 24 C-1 BL

## (undated) DEVICE — COVER SNAP 36IN X 30IN

## (undated) DEVICE — ELECTRODE BLD 1 INCH TEFLON

## (undated) DEVICE — COVER OVERHEAD SURG LT BLUE

## (undated) DEVICE — GLOVE BIOGEL ECLIPSE SZ 7.5

## (undated) DEVICE — PADDING WYTEX UNDRCST 6INX4YD

## (undated) DEVICE — TOURNIQUET SB QC DP 34X4IN

## (undated) DEVICE — CATH ULTRAVERSE 018 4X300X150

## (undated) DEVICE — COVER PROBE US 5.5X58L NON LTX

## (undated) DEVICE — GAUZE FLUFF XXLG 36X36 2 PLY

## (undated) DEVICE — BLOWER MISTER

## (undated) DEVICE — GUIDEWIRE ADVNTG 035X180CM ANG

## (undated) DEVICE — VISE RADIFOCUS MULTI TORQUE

## (undated) DEVICE — SYR 10CC LUER LOCK

## (undated) DEVICE — SEE MEDLINE ITEM 146298

## (undated) DEVICE — SEE MEDLINE ITEM 156953

## (undated) DEVICE — KIT CNTRL LINE DRSNG CHNG SLA

## (undated) DEVICE — INTRODUCER RX PSI KIT 8.5 FR

## (undated) DEVICE — DRESSING XEROFORM FOIL PK 1X8

## (undated) DEVICE — GLOVE SURGICAL LATEX SZ 6.5

## (undated) DEVICE — SUT 6/0 4-24IN PROLENE

## (undated) DEVICE — SEE MEDLINE ITEM 152522

## (undated) DEVICE — TRAY FOLEY CATH 16FR LATEX FRE

## (undated) DEVICE — HEMOSTAT SURGICEL 4X8IN

## (undated) DEVICE — GUIDE LAUNCHER 6FR EBU 3.5

## (undated) DEVICE — COVER BAND BAG 40 X 40

## (undated) DEVICE — DRAIN CHEST DRY SUCTION

## (undated) DEVICE — SUT SILK BLK BR. 2 2-60

## (undated) DEVICE — SEE MEDLINE ITEM 152523

## (undated) DEVICE — SYS LABLNG CORECT MED 4 FLG

## (undated) DEVICE — DRESSING AQUACEL AG RBBN 2X45

## (undated) DEVICE — CATH EMBOLECTOMY 5F REGULAR

## (undated) DEVICE — DRESSING ABSRBNT ISLAND 3.6X8

## (undated) DEVICE — PAD CAST SPECIALIST STRL 4

## (undated) DEVICE — DEFIBRILLATOR STAT PADZ II

## (undated) DEVICE — CATH ULTRAVERSE 018 5X300X150

## (undated) DEVICE — SUT 2/0 30IN SILK BLK BRAI

## (undated) DEVICE — SPONGE DERMACEA GAUZE 4X4

## (undated) DEVICE — STABILIZER OCTOPUS EVOLTN AS

## (undated) DEVICE — SEE MEDLINE ITEM 152622

## (undated) DEVICE — DRAPE INCISE IOBAN 2 23X17IN

## (undated) DEVICE — CATH DXTERITY JR40 100CM 5FR

## (undated) DEVICE — SEE MEDLINE ITEM 157131

## (undated) DEVICE — APPLICATOR CHLORAPREP ORN 26ML

## (undated) DEVICE — SUT 2-0 VICRYL / CT-1

## (undated) DEVICE — GAUZE SPONGE 4X4 12PLY

## (undated) DEVICE — STOCKINET TUBULAR 1 PLY 6X60IN

## (undated) DEVICE — DRESSING ADH ISLAND 3.6 X 14

## (undated) DEVICE — SEE MEDLINE ITEM 156911

## (undated) DEVICE — DRESSING XEROFORM GAUZE 5X9

## (undated) DEVICE — GUIDEWIRE NITINOL

## (undated) DEVICE — WIRE INTRAMYOCARDIAL TEMP

## (undated) DEVICE — PULSAVAC ZIMMER

## (undated) DEVICE — BLADE SAW SAG 25.40MM X 1.27MM

## (undated) DEVICE — SHEATH INTRODUCER 7FR 11CM

## (undated) DEVICE — SEE MEDLINE ITEM 152515

## (undated) DEVICE — ADHESIVE DERMABOND ADVANCED

## (undated) DEVICE — Device

## (undated) DEVICE — SET CYSTO IRRIGATION UNIV SPIK

## (undated) DEVICE — SOL 9P NACL IRR PIC IL

## (undated) DEVICE — KIT INTRO MICRO NIT VSI 4FR

## (undated) DEVICE — GUIDEWIRE ADVNTG 035X260CM ANG

## (undated) DEVICE — DRESSING N ADH OIL EMUL 3X3

## (undated) DEVICE — VALVE CONTROL COPILOT

## (undated) DEVICE — NDL 27G X 1 1/4

## (undated) DEVICE — DEV-O-LOOPS MAXI RED

## (undated) DEVICE — STAPLER SKIN PROXIMATE WIDE

## (undated) DEVICE — STOPCOCK DISCOFIX 3 WAY

## (undated) DEVICE — SEE MEDLINE ITEM 153688

## (undated) DEVICE — APPLIER LIGACLIP SM 9.38IN

## (undated) DEVICE — BLADE HEAVY DUTY SAGITTAL

## (undated) DEVICE — CATH NAVICROSS .035X150 ANGLE

## (undated) DEVICE — GUIDEWIRE STF .035X180CM ANG

## (undated) DEVICE — PROSTHESIS TRIA AIRE SIZE 10

## (undated) DEVICE — SWAB CULTURETTE II DUAL

## (undated) DEVICE — BLADE SCALP OPHTL BEVEL STR

## (undated) DEVICE — DRAPE ORTH TIBURON 77X108IN

## (undated) DEVICE — PAD RADIOLUCENT STAT ADULT

## (undated) DEVICE — SEE MEDLINE ITEM 146292

## (undated) DEVICE — MANIFOLD 4 PORT

## (undated) DEVICE — DRESSING ANTIMICROBIAL 1 INCH

## (undated) DEVICE — BANDAGE ACE ELASTIC 6"

## (undated) DEVICE — CATH ANGIO SOFT VU 5FR 65CM

## (undated) DEVICE — APPLIER CLIP LIAGCLIP 9.375IN

## (undated) DEVICE — SET DECANTER MEDICHOICE

## (undated) DEVICE — DEVICE PERCLOSE SUT CLSR 6FR

## (undated) DEVICE — INSERTS STEALTH FIBRA SIZE 2

## (undated) DEVICE — GLOVE SURG BIOGEL LATEX SZ 7.5

## (undated) DEVICE — ELECTRODE REM PLYHSV RETURN 9

## (undated) DEVICE — SHEATH INTRODUCER 4FR 11CM

## (undated) DEVICE — GAUZE WOVEN STRL 12-PLY 4X4IN

## (undated) DEVICE — SUT 2/0 36IN COATED VICRYL

## (undated) DEVICE — DRAPE PLASTIC U 60X72

## (undated) DEVICE — CATH EAGLE EYE PLATINUM

## (undated) DEVICE — CLOSURE SKIN STERI STRIP 1/2X4

## (undated) DEVICE — CONNECTOR Y 3/8X3/8X3/8

## (undated) DEVICE — CATH PRUITT IRRIG 4FROCCLUS

## (undated) DEVICE — SEE MEDLINE ITEM 146231

## (undated) DEVICE — STOCKINET 4INX48

## (undated) DEVICE — SEE L#120831

## (undated) DEVICE — CATH IMA INFINITI 4FRX100CM

## (undated) DEVICE — SUT PROLENE 6-0 24 C-1 C-1

## (undated) DEVICE — VISIPAQUE 320 200ML +PK

## (undated) DEVICE — CANISTER SUCTION 2 LTR

## (undated) DEVICE — SUT LIGACLIP SMALL XTRA

## (undated) DEVICE — CATH FOGARTY ART EMB 3FR 80CM

## (undated) DEVICE — DRAPE STERI 32 X 50

## (undated) DEVICE — INTRODUCER VASC RADPQ 5FRX10CM

## (undated) DEVICE — NDL HYPO REG 25G X 1 1/2

## (undated) DEVICE — SUT SILK 0 STRANDS 30IN BLK

## (undated) DEVICE — TOWEL OR XRAY WHITE 17X26IN

## (undated) DEVICE — SPONGE LAP 18X18 PREWASHED

## (undated) DEVICE — DRAPE SPLIT STERILE

## (undated) DEVICE — COLLECTOR SPECIMEN ANAEROBIC

## (undated) DEVICE — PAD ABD 8X10 STERILE

## (undated) DEVICE — SOL NS 1000CC

## (undated) DEVICE — BOOT SUTURE AID

## (undated) DEVICE — SUT PROLENE 4-0 RB-1 BL MO

## (undated) DEVICE — KIT PROBE COVER WITH GEL

## (undated) DEVICE — GUIDEWIRE AMPLATZ .035X260 SS

## (undated) DEVICE — GLOVE BIOGEL PI MICRO SZ 7.5

## (undated) DEVICE — BLADE SURG STAINLESS STEEL #11

## (undated) DEVICE — DRAIN PENRS STERILE LTX 18X1/2

## (undated) DEVICE — INTRODUCER CATH 6F 11CM

## (undated) DEVICE — SEE MEDLINE ITEM 157216

## (undated) DEVICE — SUT 3-0 12-18IN SILK

## (undated) DEVICE — WIPE ESENTA BARR STNG FREE 3ML

## (undated) DEVICE — CATH ULTRAVERSE 035 4X80X130

## (undated) DEVICE — SEE MEDLINE ITEM 157173

## (undated) DEVICE — SUT SILK 2-0 SH 18IN BLACK

## (undated) DEVICE — BLADE SAW STRNM 8.66X40.34X.6

## (undated) DEVICE — CATH ALL PUR URTHL RR 20FR

## (undated) DEVICE — KIT INTRODUCER MICROPUNCTR 4F

## (undated) DEVICE — COVER LIGHT HANDLE 80/CA

## (undated) DEVICE — GUIDEWIRE STF .035X260CM ANG

## (undated) DEVICE — SUT VICRYL PLUS 0 CT1 18IN

## (undated) DEVICE — TOURNIQUET SB QC DP 18X4IN

## (undated) DEVICE — TAPE UMBILICAL 1/8X36IN WHITE

## (undated) DEVICE — CATH EMERGE MR 20 X 3.00

## (undated) DEVICE — CATH ULTRAVERSE 014 3X4X150

## (undated) DEVICE — PACK ARTHROSCOPY W/ISO BAC

## (undated) DEVICE — TRAY MINOR GEN SURG OMC

## (undated) DEVICE — TRAY SKIN SCRUB WET PREMIUM

## (undated) DEVICE — TAPE RETRACT-O-TAPE

## (undated) DEVICE — UNDERGLOVE BIOGEL PI SZ 6.5 LF

## (undated) DEVICE — CATH NC QUANTUM APEX MR 3.5X12

## (undated) DEVICE — SUT MONOCRYL 3-0 SH U/D

## (undated) DEVICE — DRESSING AQUACEL SACRAL 9 X 9

## (undated) DEVICE — KIT CUSTOM MANIFOLD

## (undated) DEVICE — KIT LEFT HEART MANIFOLD CUSTOM

## (undated) DEVICE — PAD CAST SPECIALIST STRL 6

## (undated) DEVICE — KIT COLLECTION E SWAB REGULAR

## (undated) DEVICE — DEVICE PICC SECURE SORBA VIEW

## (undated) DEVICE — SOL NORMAL USPCA 0.9%

## (undated) DEVICE — TROCAR ENDOPATH XCEL 8MM 10CM

## (undated) DEVICE — INSERTS STEALTH FIBRA SIZE 5

## (undated) DEVICE — SUT 4-0 12-18IN SILK BLACK

## (undated) DEVICE — NDL 18GA X1 1/2 REG BEVEL

## (undated) DEVICE — CLIP MED TICALL

## (undated) DEVICE — SUT MCRYL PLUS 4-0 PS2 27IN

## (undated) DEVICE — TAPE ADH MEDIPORE 4 X 10YDS

## (undated) DEVICE — NDL 11GA X 6 JAMSHIDI

## (undated) DEVICE — PAD RADI FEMORAL

## (undated) DEVICE — SEE MEDLINE ITEM 146345

## (undated) DEVICE — DRAPE STERI-DRAPE 1000 17X11IN

## (undated) DEVICE — DRAPE C ARM 42 X 120 10/BX

## (undated) DEVICE — SYR 3CC LUER LOC

## (undated) DEVICE — KIT MICROINTRO 4F .018X40X7CM

## (undated) DEVICE — BLANKET UPPER BODY 78.7X29.9IN

## (undated) DEVICE — GUIDEWIRE COUGAR XT 300 ST HY

## (undated) DEVICE — NDL BOX COUNTER

## (undated) DEVICE — INSERT STEALTH FIB 30MM #1

## (undated) DEVICE — GLOVE SURGICAL LATEX SZ 7

## (undated) DEVICE — CATH OMNI FLUSH 4FR 65CM

## (undated) DEVICE — SUT 2-0 12-18IN SILK

## (undated) DEVICE — PAD ABDOMINAL 5X9 STERILE

## (undated) DEVICE — COVERS PROBE NR-48 STERILE

## (undated) DEVICE — SUT VICRYL 3-0 27 SH

## (undated) DEVICE — GUIDEWIRE VIPERWIRE 18 335 145

## (undated) DEVICE — SYS CLSR WND ENDOSCP XL

## (undated) DEVICE — SYS LABEL CORRECT MED

## (undated) DEVICE — KIT ADULT TOURNIQUET SNARE

## (undated) DEVICE — CATH IV INTROCAN 20G X 1.1

## (undated) DEVICE — CATH EMBOLECTOMY 4F REGULAR

## (undated) DEVICE — SET MICROPUNCTURE

## (undated) DEVICE — SEE MEDLINE ITEM 154981

## (undated) DEVICE — CATH SWAN GANZ STND 7FR

## (undated) DEVICE — PAD PREP 50/CA

## (undated) DEVICE — WIRE CHOICE XS 014X300

## (undated) DEVICE — CATH ULTRAVERSE 014 2X15X150

## (undated) DEVICE — TRAY MINOR GEN SURG

## (undated) DEVICE — SEE MEDLINE ITEM 152529

## (undated) DEVICE — PAD K-THERMIA 24IN X 60IN

## (undated) DEVICE — SOL IRR NACL .9% 3000ML

## (undated) DEVICE — SEE MEDLINE ITEM 107746

## (undated) DEVICE — TRAY CATH LAB OMC

## (undated) DEVICE — SYR ONLY LUER LOCK 20CC

## (undated) DEVICE — DEV-O-LOOPS MINI BLUE

## (undated) DEVICE — INSERTS STEALTH FIBRA SIZE 1.

## (undated) DEVICE — BANDAGE ACE DOUBLE STER 6IN

## (undated) DEVICE — SYR MED RAD 150ML

## (undated) DEVICE — SUT VICRYL CTD 2-0 GI 27 SH

## (undated) DEVICE — GOWN SURG 2XL DISP TIE BACK

## (undated) DEVICE — SET INFUSION WINGED 19GA X 3/4

## (undated) DEVICE — SPONGE IV DRAIN 4X4 STERILE

## (undated) DEVICE — BLADE SAW STERNAL 5/BX

## (undated) DEVICE — STAPLER SKIN ROTATING HEAD

## (undated) DEVICE — BANDAGE DERMACEA 3 X 4

## (undated) DEVICE — BLADE MEDIUM 9MM X 25MM

## (undated) DEVICE — GUIDEWIRE STD .035X260CM ANG

## (undated) DEVICE — SUT PERCLOSE PROSTYLE MEDIATE

## (undated) DEVICE — SYR MARK 7 ARTERION 150ML

## (undated) DEVICE — INFLATOR ENCORE 26 BLLN INFL

## (undated) DEVICE — KIT URINARY CATH URINE METER

## (undated) DEVICE — SUT VICRYL 2-0 36 CT-1

## (undated) DEVICE — SET MICROPUNCT 5FRMPIS-501

## (undated) DEVICE — CATH EMBOLECTOMY 3F REGULAR

## (undated) DEVICE — SYS VIRTUOSAPH PLUS EVM

## (undated) DEVICE — DRAPE INCISE IOBAN 2 23X33IN

## (undated) DEVICE — DRESSING GAUZE XEROFORM 5X9

## (undated) DEVICE — SEE MEDLINE ITEM 157187

## (undated) DEVICE — SUT 7/0 24IN PROLENE BL MO

## (undated) DEVICE — BLADE SURG #15 CARBON STEEL

## (undated) DEVICE — MARKER SKIN STND TIP BLUE BARR

## (undated) DEVICE — LOOP VESSEL BLUE MAXI

## (undated) DEVICE — CATH EMBOSHIELD NAV6 7.2X190CM

## (undated) DEVICE — SYR 50CC LL

## (undated) DEVICE — SUT VICRYL+ 27 UR-6 VIOL

## (undated) DEVICE — TROCAR ENDOPATH XCEL 5MM 7.5CM

## (undated) DEVICE — FIBRILLAR ABS HEMOSTAT 4X4

## (undated) DEVICE — NDL 22GA X1 1/2 REG BEVEL

## (undated) DEVICE — INTRODUCER SHEATH 5FR W/.035

## (undated) DEVICE — CATH LUTONIX DCB 018X130X5X220

## (undated) DEVICE — SUT 6 18IN STEEL MONO CCS

## (undated) DEVICE — BRA POST SURGICAL WHT 44-46IN

## (undated) DEVICE — GEL AQUASONIC 100 STERILE20GM

## (undated) DEVICE — DRESSING TRANS 4X4 TEGADERM

## (undated) DEVICE — TUBING HF INSUFFLATION W/ FLTR

## (undated) DEVICE — CATH EMPULSE ANGLED 5FR PIGTAI

## (undated) DEVICE — GUIDEWIRE ADVNTG 018X300CM ANG

## (undated) DEVICE — CATH PRUITT IRRIG 5FR OCCLUS

## (undated) DEVICE — SUT 2-0 VICRYL / SH (J417)

## (undated) DEVICE — CATH PRUITT IRRIG 9F OCCLUS

## (undated) DEVICE — CONTAINER SPECIMEN OR STER 4OZ

## (undated) DEVICE — BLADE ELECTRO EDGE INSULATED

## (undated) DEVICE — DRAPE STERI INSTRUMENT 1018

## (undated) DEVICE — SUT MONOCRYL 4-0 PS-1 UND

## (undated) DEVICE — BLADE SAW STERN .076MM 6.1MM L

## (undated) DEVICE — CAUTERY PUSHBUTTON PENCIL

## (undated) DEVICE — BLADE SURG CARBON STEEL SZ11

## (undated) DEVICE — BLADE SURG STAINLESS STEEL #15

## (undated) DEVICE — SUT PROLENE 4-0 SH BLU 36IN

## (undated) DEVICE — GOWN SURGICAL X-LARGE

## (undated) DEVICE — CATH LUTONIX DCB 018X130X6X220

## (undated) DEVICE — CANNULA VESSEL FREE FLOW

## (undated) DEVICE — SUT BONE WAX 2.5 GRMS 12/BX

## (undated) DEVICE — KIT MANIFOLD LOW PRESS TUBING

## (undated) DEVICE — SUT MONOCRYL 2-0 CT-1 VIL

## (undated) DEVICE — STOPCOCK 3-WAY

## (undated) DEVICE — CATH SEEKER .035 X65CM

## (undated) DEVICE — CATH 5FR OMNIFLUSH 65CM .038

## (undated) DEVICE — DRAPE HAND STERILE

## (undated) DEVICE — CATH FOGARTY EMB 4FR 80CM

## (undated) DEVICE — SPONGE GAUZE 16PLY 4X4

## (undated) DEVICE — PLEDGET SUT SOFT 3/8X3/16X1/16

## (undated) DEVICE — AV VASCULAR ACCESS PACK

## (undated) DEVICE — DRESSING TELFA STRL 4X3 LF

## (undated) DEVICE — SUT VICRYL PLUS 2-0 CT1 18

## (undated) DEVICE — CATH IMPULSE 5FR PIGTAIL 125CM

## (undated) DEVICE — GUIDEWIRE RUNTHROUGH EF 180CM

## (undated) DEVICE — TRAY HEART

## (undated) DEVICE — BANDAGE ELAS SOFTWRAP ST 6X5YD

## (undated) DEVICE — SEE MEDLINE ITEM 157117

## (undated) DEVICE — TOURNIQUET TOURNIKWIK 2 TB 6IN

## (undated) DEVICE — PACK DRAPE UNIVERSAL CONVERTOR

## (undated) DEVICE — SEE MEDLINE ITEM 157110

## (undated) DEVICE — SUT 3/0 18IN COATED VICRYLP

## (undated) DEVICE — SHEATH MICROPUNCTURE PEDAL 4FR

## (undated) DEVICE — STOPCOCK 1 WAY PLASTIC

## (undated) DEVICE — KIT HAND CONTROL HIGH PRESSUR

## (undated) DEVICE — DRESSING ADHESIVE ISLAND 3 X 6

## (undated) DEVICE — COVER INSTR ELASTIC BAND 40X20

## (undated) DEVICE — SOL IRR NACL .9% 1000CC

## (undated) DEVICE — GLOVE BIOGEL PI MICRO SZ 6.5

## (undated) DEVICE — BLADE #15 STERILE CARBON

## (undated) DEVICE — SUT PROLENE 3-0 PS-2 BL 18IN

## (undated) DEVICE — GLOVE BIOGEL ECLIPSE SZ 6

## (undated) DEVICE — DRAIN CHANNEL ROUND 19FR

## (undated) DEVICE — CONTRAST VISIPAQUE 50ML

## (undated) DEVICE — SUT PROLENE 6-0 BV-1 30IN

## (undated) DEVICE — SUT PROLENE 7-0 BV-1 30

## (undated) DEVICE — DRAIN JACKSON PRATT 10MM 20/CA

## (undated) DEVICE — TOWEL OR DISP STRL BLUE 4/PK

## (undated) DEVICE — ADHESIVE DERMABOND MINI HV

## (undated) DEVICE — SUT PROLENE 6-0 C-1 30IN BL

## (undated) DEVICE — BANDAGE MATRIX HK LOOP 6IN 5YD

## (undated) DEVICE — BANDAGE ESMARK ELASTIC ST 6X9

## (undated) DEVICE — KIT IRR SUCTION HND PIECE

## (undated) DEVICE — CATH DXTERITY JL35 100CM 5FR

## (undated) DEVICE — KIT SYR REUSABLE

## (undated) DEVICE — PAD DEFIB CADENCE ADULT R2

## (undated) DEVICE — GUIDEWIRE EMERALD 150CM PTFE

## (undated) DEVICE — SEE MEDLINE ITEM 156894

## (undated) DEVICE — SHOE POST OP FEMALE LG BLACK

## (undated) DEVICE — DRAPE SLUSH WARMER WITH DISC

## (undated) DEVICE — SET BASIN 48X48IN 6000ML RING

## (undated) DEVICE — TAPE UMBILICAL 10X1/8

## (undated) DEVICE — TRANSDUCER ADULT DISP

## (undated) DEVICE — INFLATOR ADVANTAGE ENCORE 26

## (undated) DEVICE — SHEATH DESTINATION 6FR 45CM

## (undated) DEVICE — CATH EMBOLECTOMY 6FR

## (undated) DEVICE — BLADE 4IN EDGE INSULATED

## (undated) DEVICE — SOL NACL 0.9% IV INJ 1000ML

## (undated) DEVICE — TROCAR ENDOPATH XCEL 5X75MM

## (undated) DEVICE — INFLATOR ENCORE

## (undated) DEVICE — SPONGE COTTON TRAY 4X4IN

## (undated) DEVICE — SUT ETHILON 4-0 PS2 18 BLK

## (undated) DEVICE — SET MICRO PUNCT 4FR/MPIS-401

## (undated) DEVICE — SEE MEDLINE ITEM 146308

## (undated) DEVICE — SEE MEDLINE ITEM 157166

## (undated) DEVICE — SUT CTD VICRYL 0 UND CR/CTX

## (undated) DEVICE — CATH PRONTO LP ROUND 6FR 1.5MM

## (undated) DEVICE — PACK CATH LAB

## (undated) DEVICE — RETRACTOR OCTOBASE INSERT HOLD

## (undated) DEVICE — KIT SAHARA DRAPE DRAW/LIFT

## (undated) DEVICE — SEE MEDLINE ITEM 152487

## (undated) DEVICE — CATH EAGLE EYE ST .014X20X150

## (undated) DEVICE — SPONGE SUPER KERLIX 6X6.75IN

## (undated) DEVICE — TUBING CNTRST INJ ADPT 72IN

## (undated) DEVICE — PACK UNIVERSAL SPLIT II

## (undated) DEVICE — TRAY PICC CENTRAL LINE DRSNG

## (undated) DEVICE — SYR SLIP TIP 1CC

## (undated) DEVICE — CLIP SPRING 6MM

## (undated) DEVICE — GLOVE BIOGEL SZ 8 1/2

## (undated) DEVICE — SHEATH INTRODUCER 6FR 11CM

## (undated) DEVICE — SUT 2/0 30IN ETHIBOND

## (undated) DEVICE — SEE MEDLINE ITEM 157116

## (undated) DEVICE — SUT VICRYL PLUS 3-0 SH 18IN

## (undated) DEVICE — DRAPE OPTIMA MAJOR PEDIATRIC

## (undated) DEVICE — POWDER ARISTA AH 3G

## (undated) DEVICE — DECANTER FLUID TRNSF WHITE 9IN

## (undated) DEVICE — ELECTRODE BLADE INSULATED 1 IN

## (undated) DEVICE — GOWN NONREINF SET-IN SLV 2XL

## (undated) DEVICE — GUIDEWIRE SAFE T J TIP 145X2.5

## (undated) DEVICE — SEE MEDLINE ITEM 146417

## (undated) DEVICE — DRESSING N ADH OIL EMUL 3X8 3S

## (undated) DEVICE — GUIDWIRE HI-TORQUE .018X300CM

## (undated) DEVICE — PUNCH AORTIC 4.8MM